# Patient Record
Sex: FEMALE | Race: WHITE | Employment: OTHER | URBAN - METROPOLITAN AREA
[De-identification: names, ages, dates, MRNs, and addresses within clinical notes are randomized per-mention and may not be internally consistent; named-entity substitution may affect disease eponyms.]

---

## 2017-09-19 PROBLEM — G20 PARKINSON DISEASE (HCC): Status: ACTIVE | Noted: 2017-09-19

## 2018-06-18 ENCOUNTER — TELEPHONE (OUTPATIENT)
Dept: SURGERY | Age: 68
End: 2018-06-18

## 2018-06-18 NOTE — TELEPHONE ENCOUNTER
New patient/ Cancelled/ Same day on 06-18-18 for Screening Colon Consult. Patient declined to reschedule at this moment since her  had recently passed.

## 2018-07-23 DIAGNOSIS — G20 PARKINSON DISEASE (HCC): ICD-10-CM

## 2018-09-14 ENCOUNTER — OFFICE VISIT (OUTPATIENT)
Dept: NEUROLOGY | Age: 68
End: 2018-09-14
Payer: COMMERCIAL

## 2018-09-14 VITALS
HEART RATE: 89 BPM | OXYGEN SATURATION: 93 % | SYSTOLIC BLOOD PRESSURE: 125 MMHG | RESPIRATION RATE: 12 BRPM | BODY MASS INDEX: 41.91 KG/M2 | TEMPERATURE: 97.7 F | HEIGHT: 61 IN | DIASTOLIC BLOOD PRESSURE: 77 MMHG | WEIGHT: 222 LBS

## 2018-09-14 DIAGNOSIS — G20 PARKINSON DISEASE (HCC): ICD-10-CM

## 2018-09-14 PROCEDURE — 4040F PNEUMOC VAC/ADMIN/RCVD: CPT | Performed by: NURSE PRACTITIONER

## 2018-09-14 PROCEDURE — 1101F PT FALLS ASSESS-DOCD LE1/YR: CPT | Performed by: NURSE PRACTITIONER

## 2018-09-14 PROCEDURE — 1090F PRES/ABSN URINE INCON ASSESS: CPT | Performed by: NURSE PRACTITIONER

## 2018-09-14 PROCEDURE — G8427 DOCREV CUR MEDS BY ELIG CLIN: HCPCS | Performed by: NURSE PRACTITIONER

## 2018-09-14 PROCEDURE — 3017F COLORECTAL CA SCREEN DOC REV: CPT | Performed by: NURSE PRACTITIONER

## 2018-09-14 PROCEDURE — G8399 PT W/DXA RESULTS DOCUMENT: HCPCS | Performed by: NURSE PRACTITIONER

## 2018-09-14 PROCEDURE — 99213 OFFICE O/P EST LOW 20 MIN: CPT | Performed by: NURSE PRACTITIONER

## 2018-09-14 PROCEDURE — 1036F TOBACCO NON-USER: CPT | Performed by: NURSE PRACTITIONER

## 2018-09-14 PROCEDURE — G8417 CALC BMI ABV UP PARAM F/U: HCPCS | Performed by: NURSE PRACTITIONER

## 2018-09-14 PROCEDURE — 1123F ACP DISCUSS/DSCN MKR DOCD: CPT | Performed by: NURSE PRACTITIONER

## 2018-09-14 RX ORDER — CARBIDOPA AND LEVODOPA 50; 200 MG/1; MG/1
1 TABLET, EXTENDED RELEASE ORAL 3 TIMES DAILY
Qty: 360 TABLET | Refills: 2 | Status: SHIPPED | OUTPATIENT
Start: 2018-09-14 | End: 2018-11-19 | Stop reason: SDUPTHER

## 2018-09-14 RX ORDER — HYDROXYZINE HYDROCHLORIDE 10 MG/1
TABLET, FILM COATED ORAL NIGHTLY
COMMUNITY
End: 2019-03-14 | Stop reason: ALTCHOICE

## 2018-09-14 NOTE — PATIENT INSTRUCTIONS
medical condition or this instruction, always ask your healthcare professional. [place] [address] any warranty or liability for your use of this information.

## 2018-09-14 NOTE — PROGRESS NOTES
[address] [place] [address]. Jerardo Betancur M.D., F.A.C.P. Lucia Sneed, DNP, APRN, ACNS-BC  Jami Zarate. Lachelle Iglesias, MSN, APRN-FNP-C  [address] [facility] [place] [address]  [place], [address]  Phone: [phone]  Fax: [phone]         Bernie Garcia is a 75 y.o. right handed woman. We are following her for Parkinson's disease    She is accompanied by her daughter again today    Her  passed away suddenly in July and [facility] [place] [facility]doing [facility] now. She notes improvement in her Parkinson's symptoms since starting Neupro 4 mg patches. She remains on Sinemet CR 50/200 TID    Decrease in hand tremors, improvement in bradykinesias and no freezing spells. She has been exercising daily--walking up and down her steps    No falls and she feels she is walking better    She does admit that when her other children \"stress her out\" her hand tremors will increase. She continues to follow with psych and was taken off Abilify and placed on Pristiq---taking Vistaril at night for sleep. No dysphagia or cognitive changes    SERENA eval remains pending    Otherwise, no new neuro or medical concerns    No chest pain or palpitations  No SOB  No vertigo, lightheadedness or loss of consciousness  No incontinence of bowels or bladder  No itching or bruising appreciated  No numbness, tingling or focal arm/leg weakness    ROS otherwise negative     Medications:     Prior to Admission medications    Medication Sig Start Date End Date Taking?  Authorizing Provider   hydrOXYzine (ATARAX) 10 MG tablet Take by mouth nightly   Yes Historical Provider, MD   insulin glargine (BASAGLAR KWIKPEN) 100 UNIT/ML injection pen Inject 90 Units into the skin nightly 9/7/18 10/7/18 Yes Harshil Rankin MD   Albuterol Sulfate (VENTOLIN HFA IN) Inhale 2 puffs into the lungs every 6 hours as needed   Yes Historical Provider, MD   lisinopril (PRINIVIL;ZESTRIL) 20 MG tablet Take 1 tablet by mouth daily

## 2018-11-19 ENCOUNTER — TELEPHONE (OUTPATIENT)
Dept: NEUROLOGY | Age: 68
End: 2018-11-19

## 2018-11-19 DIAGNOSIS — G20 PARKINSON DISEASE (HCC): ICD-10-CM

## 2018-11-19 RX ORDER — CARBIDOPA AND LEVODOPA 50; 200 MG/1; MG/1
1 TABLET, EXTENDED RELEASE ORAL 3 TIMES DAILY
Qty: 360 TABLET | Refills: 2 | Status: SHIPPED | OUTPATIENT
Start: 2018-11-19 | End: 2018-11-19 | Stop reason: SDUPTHER

## 2018-11-19 NOTE — TELEPHONE ENCOUNTER
LM for pt to return call so 6 mo f/u with RASHARD Rivera, can be scheduled in March.   Electronically signed by Joaquin Ocampo on 11/19/18 at 9:07 AM

## 2018-11-20 DIAGNOSIS — G20 PARKINSON DISEASE (HCC): ICD-10-CM

## 2018-11-20 RX ORDER — CARBIDOPA AND LEVODOPA 50; 200 MG/1; MG/1
1 TABLET, EXTENDED RELEASE ORAL 3 TIMES DAILY
Qty: 270 TABLET | Refills: 3 | Status: SHIPPED | OUTPATIENT
Start: 2018-11-20 | End: 2019-09-13 | Stop reason: SDUPTHER

## 2018-12-18 ENCOUNTER — HOSPITAL ENCOUNTER (OUTPATIENT)
Dept: AUDIOLOGY | Age: 68
Discharge: HOME | End: 2018-12-18
Payer: COMMERCIAL

## 2018-12-18 PROCEDURE — 92567 TYMPANOMETRY: CPT | Performed by: AUDIOLOGIST

## 2018-12-18 PROCEDURE — 92557 COMPREHENSIVE HEARING TEST: CPT | Performed by: AUDIOLOGIST

## 2018-12-18 NOTE — PROGRESS NOTES
AUDIOMETRIC EVALUATION    REASON FOR REFERRAL:  This patient was referred for audiometric testing by Dominick Daniel MD due to history of hearing loss, right worse than left. She has an [address] hearing aid which is 11years old. She states all she hears is static. She feels her hearing has gotten worse since her last test.        RESULTS:  Pure tone audiometric testing using earphones and insert earphones was carried out. Results revealed air conduction thresholds averaging 25 dBHL through 2000 Hz in the left ear and 60 dBHL in the right ear. Speech reception thresholds were obtained at 20 dBHL and 50 dBHL for the left and right ears . Speech discrimination testing was performed at 55/80 dBHL. Obtained were scores of 92%. (Un)Masked Bone Conduction Testing revealed air bone gaps averaging 15 dBHL through 4000 Hz. Cain lateralized to the left ear at 1000 Hz. Tympanometry was administered and revealed shallow tympanograms bilaterally. 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 Speech testing was in good agreement with the pure tones for the left ear and fair agreement with the pure tones for the right ear. Speech discrimination was excellent bilaterally. Impedance testing revealed essentially normal middle ear function in the right ear (type As) and an essentially flat tympanogram in the left ear (broad shallow peak). These results are similar to those obtained 5 years ago. An ENT consult is suggested if you feel it is clinically warranted due to reduced middle ear compliance bilaterally. She has an appointment with Dr. Ad Wright in March. .     A prior authorization for new hearing aids will be obtained. The above results were reviewed with the patient/parent.       If I can be of further assistance or provide additional information, please do not hesitate to contact this

## 2018-12-28 ENCOUNTER — TELEPHONE (OUTPATIENT)
Dept: NEUROLOGY | Age: 68
End: 2018-12-28

## 2018-12-28 NOTE — TELEPHONE ENCOUNTER
Pt called stating since waking up this morning she has been dizzy like she is going to fall \"face first.\" Pt also c/o feeling lightheaded and that she feels herself shaking on the inside of her body. She has eaten today and take her meds as normal. Pt would like to know if this is related to her Parkinson's. Please advise. Ok to LM if no answer.   Electronically signed by Ara Plaza on 12/28/18 at 10:30 AM

## 2019-01-11 ENCOUNTER — HOSPITAL ENCOUNTER (OUTPATIENT)
Dept: AUDIOLOGY | Age: 69
Discharge: HOME | End: 2019-01-11
Payer: COMMERCIAL

## 2019-01-11 PROCEDURE — 9990000010 HC NO CHARGE VISIT: Performed by: AUDIOLOGIST

## 2019-01-16 ENCOUNTER — TELEPHONE (OUTPATIENT)
Dept: NEUROLOGY | Age: 69
End: 2019-01-16

## 2019-01-21 ENCOUNTER — TELEPHONE (OUTPATIENT)
Dept: NEUROLOGY | Age: 69
End: 2019-01-21

## 2019-01-21 DIAGNOSIS — G20 PARKINSON DISEASE (HCC): Primary | ICD-10-CM

## 2019-02-01 ENCOUNTER — HOSPITAL ENCOUNTER (OUTPATIENT)
Dept: AUDIOLOGY | Age: 69
Discharge: HOME | End: 2019-02-01
Payer: COMMERCIAL

## 2019-02-01 PROCEDURE — 9990000010 HC NO CHARGE VISIT: Performed by: AUDIOLOGIST

## 2019-02-06 ENCOUNTER — TELEPHONE (OUTPATIENT)
Dept: NEUROLOGY | Age: 69
End: 2019-02-06

## 2019-02-13 ENCOUNTER — HOSPITAL ENCOUNTER (OUTPATIENT)
Dept: AUDIOLOGY | Age: 69
Discharge: HOME | End: 2019-02-13

## 2019-02-13 PROCEDURE — 9990000010 HC NO CHARGE VISIT: Performed by: AUDIOLOGIST

## 2019-03-01 ENCOUNTER — HOSPITAL ENCOUNTER (OUTPATIENT)
Dept: AUDIOLOGY | Age: 69
Discharge: HOME | End: 2019-03-01
Payer: MEDICARE

## 2019-03-01 PROCEDURE — V5160 DISPENSING FEE BINAURAL: HCPCS | Performed by: AUDIOLOGIST

## 2019-03-01 PROCEDURE — V5256 HEARING AID, DIGIT, MON, ITE: HCPCS | Performed by: AUDIOLOGIST

## 2019-03-04 ENCOUNTER — OFFICE VISIT (OUTPATIENT)
Dept: ENT CLINIC | Age: 69
End: 2019-03-04
Payer: MEDICARE

## 2019-03-04 VITALS
BODY MASS INDEX: 39.46 KG/M2 | OXYGEN SATURATION: 95 % | DIASTOLIC BLOOD PRESSURE: 80 MMHG | HEART RATE: 94 BPM | HEIGHT: 61 IN | SYSTOLIC BLOOD PRESSURE: 142 MMHG | WEIGHT: 209 LBS

## 2019-03-04 DIAGNOSIS — R49.0 HOARSENESS: ICD-10-CM

## 2019-03-04 DIAGNOSIS — G20 PARKINSON DISEASE (HCC): Primary | ICD-10-CM

## 2019-03-04 PROCEDURE — G8417 CALC BMI ABV UP PARAM F/U: HCPCS | Performed by: OTOLARYNGOLOGY

## 2019-03-04 PROCEDURE — 31575 DIAGNOSTIC LARYNGOSCOPY: CPT | Performed by: OTOLARYNGOLOGY

## 2019-03-04 PROCEDURE — G8427 DOCREV CUR MEDS BY ELIG CLIN: HCPCS | Performed by: OTOLARYNGOLOGY

## 2019-03-04 PROCEDURE — G8482 FLU IMMUNIZE ORDER/ADMIN: HCPCS | Performed by: OTOLARYNGOLOGY

## 2019-03-04 PROCEDURE — 1123F ACP DISCUSS/DSCN MKR DOCD: CPT | Performed by: OTOLARYNGOLOGY

## 2019-03-04 PROCEDURE — 1090F PRES/ABSN URINE INCON ASSESS: CPT | Performed by: OTOLARYNGOLOGY

## 2019-03-04 PROCEDURE — 99204 OFFICE O/P NEW MOD 45 MIN: CPT | Performed by: OTOLARYNGOLOGY

## 2019-03-04 PROCEDURE — 1101F PT FALLS ASSESS-DOCD LE1/YR: CPT | Performed by: OTOLARYNGOLOGY

## 2019-03-04 PROCEDURE — G8399 PT W/DXA RESULTS DOCUMENT: HCPCS | Performed by: OTOLARYNGOLOGY

## 2019-03-04 PROCEDURE — 4040F PNEUMOC VAC/ADMIN/RCVD: CPT | Performed by: OTOLARYNGOLOGY

## 2019-03-04 PROCEDURE — 1036F TOBACCO NON-USER: CPT | Performed by: OTOLARYNGOLOGY

## 2019-03-04 PROCEDURE — 3017F COLORECTAL CA SCREEN DOC REV: CPT | Performed by: OTOLARYNGOLOGY

## 2019-03-04 RX ORDER — AZELASTINE 1 MG/ML
1 SPRAY, METERED NASAL 2 TIMES DAILY
Qty: 1 BOTTLE | Refills: 3 | Status: SHIPPED | OUTPATIENT
Start: 2019-03-04 | End: 2019-03-14 | Stop reason: ALTCHOICE

## 2019-03-04 ASSESSMENT — ENCOUNTER SYMPTOMS
RHINORRHEA: 0
ABDOMINAL PAIN: 0
STRIDOR: 0
EYE PAIN: 0
NAUSEA: 0
VOICE CHANGE: 1
EYE DISCHARGE: 0
SINUS PRESSURE: 0
SHORTNESS OF BREATH: 0

## 2019-03-07 DIAGNOSIS — G20 PARKINSON DISEASE (HCC): ICD-10-CM

## 2019-03-14 ENCOUNTER — APPOINTMENT (OUTPATIENT)
Dept: GENERAL RADIOLOGY | Age: 69
DRG: 193 | End: 2019-03-14
Payer: MEDICARE

## 2019-03-14 ENCOUNTER — TELEPHONE (OUTPATIENT)
Dept: NEUROLOGY | Age: 69
End: 2019-03-14

## 2019-03-14 ENCOUNTER — HOSPITAL ENCOUNTER (INPATIENT)
Age: 69
LOS: 4 days | Discharge: HOME | DRG: 193 | End: 2019-03-18
Attending: EMERGENCY MEDICINE | Admitting: INTERNAL MEDICINE
Payer: MEDICARE

## 2019-03-14 DIAGNOSIS — J18.9 PNEUMONIA DUE TO ORGANISM: Primary | ICD-10-CM

## 2019-03-14 DIAGNOSIS — R09.02 HYPOXIA: ICD-10-CM

## 2019-03-14 DIAGNOSIS — J45.901 REACTIVE AIRWAY DISEASE WITH ACUTE EXACERBATION, UNSPECIFIED ASTHMA SEVERITY, UNSPECIFIED WHETHER PERSISTENT: ICD-10-CM

## 2019-03-14 LAB
ALBUMIN SERPL-MCNC: 3.9 G/DL (ref 3.5–5.2)
ALP BLD-CCNC: 94 U/L (ref 35–104)
ALT SERPL-CCNC: 7 U/L (ref 0–32)
ANION GAP SERPL CALCULATED.3IONS-SCNC: 13 MMOL/L (ref 7–16)
APTT: 29.6 SEC (ref 24.5–35.1)
AST SERPL-CCNC: 28 U/L (ref 0–31)
BASOPHILS ABSOLUTE: 0.05 E9/L (ref 0–0.2)
BASOPHILS RELATIVE PERCENT: 0.8 % (ref 0–2)
BILIRUB SERPL-MCNC: 0.4 MG/DL (ref 0–1.2)
BUN BLDV-MCNC: 11 MG/DL (ref 8–23)
CALCIUM SERPL-MCNC: 8.6 MG/DL (ref 8.6–10.2)
CHLORIDE BLD-SCNC: 100 MMOL/L (ref 98–107)
CO2: 28 MMOL/L (ref 22–29)
CREAT SERPL-MCNC: 0.7 MG/DL (ref 0.5–1)
EKG ATRIAL RATE: 90 BPM
EKG P AXIS: 63 DEGREES
EKG P-R INTERVAL: 160 MS
EKG Q-T INTERVAL: 402 MS
EKG QRS DURATION: 74 MS
EKG QTC CALCULATION (BAZETT): 491 MS
EKG R AXIS: 71 DEGREES
EKG T AXIS: 62 DEGREES
EKG VENTRICULAR RATE: 90 BPM
EOSINOPHILS ABSOLUTE: 0.09 E9/L (ref 0.05–0.5)
EOSINOPHILS RELATIVE PERCENT: 1.4 % (ref 0–6)
FILM ARRAY ADENOVIRUS: ABNORMAL
FILM ARRAY BORDETELLA PERTUSSIS: ABNORMAL
FILM ARRAY CHLAMYDOPHILIA PNEUMONIAE: ABNORMAL
FILM ARRAY CORONAVIRUS 229E: ABNORMAL
FILM ARRAY CORONAVIRUS HKU1: ABNORMAL
FILM ARRAY CORONAVIRUS NL63: ABNORMAL
FILM ARRAY CORONAVIRUS OC43: ABNORMAL
FILM ARRAY INFLUENZA A VIRUS 09H1: ABNORMAL
FILM ARRAY INFLUENZA A VIRUS H1: ABNORMAL
FILM ARRAY INFLUENZA B: ABNORMAL
FILM ARRAY METAPNEUMOVIRUS: ABNORMAL
FILM ARRAY MYCOPLASMA PNEUMONIAE: ABNORMAL
FILM ARRAY PARAINFLUENZA VIRUS 1: ABNORMAL
FILM ARRAY PARAINFLUENZA VIRUS 2: ABNORMAL
FILM ARRAY PARAINFLUENZA VIRUS 3: ABNORMAL
FILM ARRAY PARAINFLUENZA VIRUS 4: ABNORMAL
FILM ARRAY RESPIRATORY SYNCITIAL VIRUS: ABNORMAL
FILM ARRAY RHINOVIRUS/ENTEROVIRUS: ABNORMAL
GFR AFRICAN AMERICAN: >60
GFR NON-AFRICAN AMERICAN: >60 ML/MIN/1.73
GLUCOSE BLD-MCNC: 171 MG/DL (ref 74–99)
HCT VFR BLD CALC: 41.9 % (ref 34–48)
HEMOGLOBIN: 13.1 G/DL (ref 11.5–15.5)
IMMATURE GRANULOCYTES #: 0.07 E9/L
IMMATURE GRANULOCYTES %: 1.1 % (ref 0–5)
INR BLD: 1.2
LACTIC ACID: 1.1 MMOL/L (ref 0.5–2.2)
LYMPHOCYTES ABSOLUTE: 1.62 E9/L (ref 1.5–4)
LYMPHOCYTES RELATIVE PERCENT: 25.5 % (ref 20–42)
MAGNESIUM: 1.7 MG/DL (ref 1.6–2.6)
MCH RBC QN AUTO: 25.7 PG (ref 26–35)
MCHC RBC AUTO-ENTMCNC: 31.3 % (ref 32–34.5)
MCV RBC AUTO: 82.3 FL (ref 80–99.9)
METER GLUCOSE: 251 MG/DL (ref 74–99)
METER GLUCOSE: 331 MG/DL (ref 74–99)
MONOCYTES ABSOLUTE: 0.36 E9/L (ref 0.1–0.95)
MONOCYTES RELATIVE PERCENT: 5.7 % (ref 2–12)
NEUTROPHILS ABSOLUTE: 4.17 E9/L (ref 1.8–7.3)
NEUTROPHILS RELATIVE PERCENT: 65.5 % (ref 43–80)
ORGANISM: ABNORMAL
ORGANISM: ABNORMAL
PDW BLD-RTO: 15.2 FL (ref 11.5–15)
PLATELET # BLD: 260 E9/L (ref 130–450)
PMV BLD AUTO: 10.1 FL (ref 7–12)
POTASSIUM SERPL-SCNC: 4 MMOL/L (ref 3.5–5)
PRO-BNP: 175 PG/ML (ref 0–125)
PROTHROMBIN TIME: 13.3 SEC (ref 9.3–12.4)
RBC # BLD: 5.09 E12/L (ref 3.5–5.5)
SODIUM BLD-SCNC: 141 MMOL/L (ref 132–146)
TOTAL PROTEIN: 7.2 G/DL (ref 6.4–8.3)
TROPONIN: <0.01 NG/ML (ref 0–0.03)
WBC # BLD: 6.4 E9/L (ref 4.5–11.5)

## 2019-03-14 PROCEDURE — 85730 THROMBOPLASTIN TIME PARTIAL: CPT

## 2019-03-14 PROCEDURE — 80053 COMPREHEN METABOLIC PANEL: CPT

## 2019-03-14 PROCEDURE — 6360000002 HC RX W HCPCS: Performed by: INTERNAL MEDICINE

## 2019-03-14 PROCEDURE — 87040 BLOOD CULTURE FOR BACTERIA: CPT

## 2019-03-14 PROCEDURE — 6360000002 HC RX W HCPCS: Performed by: NURSE PRACTITIONER

## 2019-03-14 PROCEDURE — 6360000002 HC RX W HCPCS: Performed by: EMERGENCY MEDICINE

## 2019-03-14 PROCEDURE — 6370000000 HC RX 637 (ALT 250 FOR IP): Performed by: NURSE PRACTITIONER

## 2019-03-14 PROCEDURE — 94664 DEMO&/EVAL PT USE INHALER: CPT

## 2019-03-14 PROCEDURE — 96365 THER/PROPH/DIAG IV INF INIT: CPT

## 2019-03-14 PROCEDURE — 82962 GLUCOSE BLOOD TEST: CPT

## 2019-03-14 PROCEDURE — 6370000000 HC RX 637 (ALT 250 FOR IP): Performed by: INTERNAL MEDICINE

## 2019-03-14 PROCEDURE — 94640 AIRWAY INHALATION TREATMENT: CPT

## 2019-03-14 PROCEDURE — 83880 ASSAY OF NATRIURETIC PEPTIDE: CPT

## 2019-03-14 PROCEDURE — 84484 ASSAY OF TROPONIN QUANT: CPT

## 2019-03-14 PROCEDURE — 2580000003 HC RX 258: Performed by: INTERNAL MEDICINE

## 2019-03-14 PROCEDURE — 87581 M.PNEUMON DNA AMP PROBE: CPT

## 2019-03-14 PROCEDURE — 96375 TX/PRO/DX INJ NEW DRUG ADDON: CPT

## 2019-03-14 PROCEDURE — 87633 RESP VIRUS 12-25 TARGETS: CPT

## 2019-03-14 PROCEDURE — 83605 ASSAY OF LACTIC ACID: CPT

## 2019-03-14 PROCEDURE — 83735 ASSAY OF MAGNESIUM: CPT

## 2019-03-14 PROCEDURE — 2580000003 HC RX 258: Performed by: EMERGENCY MEDICINE

## 2019-03-14 PROCEDURE — 85610 PROTHROMBIN TIME: CPT

## 2019-03-14 PROCEDURE — 87486 CHLMYD PNEUM DNA AMP PROBE: CPT

## 2019-03-14 PROCEDURE — 1200000000 HC SEMI PRIVATE

## 2019-03-14 PROCEDURE — 2500000003 HC RX 250 WO HCPCS: Performed by: EMERGENCY MEDICINE

## 2019-03-14 PROCEDURE — 99285 EMERGENCY DEPT VISIT HI MDM: CPT

## 2019-03-14 PROCEDURE — 94760 N-INVAS EAR/PLS OXIMETRY 1: CPT

## 2019-03-14 PROCEDURE — 94761 N-INVAS EAR/PLS OXIMETRY MLT: CPT

## 2019-03-14 PROCEDURE — 87798 DETECT AGENT NOS DNA AMP: CPT

## 2019-03-14 PROCEDURE — 71046 X-RAY EXAM CHEST 2 VIEWS: CPT

## 2019-03-14 PROCEDURE — 93005 ELECTROCARDIOGRAM TRACING: CPT | Performed by: NURSE PRACTITIONER

## 2019-03-14 PROCEDURE — 2580000003 HC RX 258: Performed by: NURSE PRACTITIONER

## 2019-03-14 PROCEDURE — 85025 COMPLETE CBC W/AUTO DIFF WBC: CPT

## 2019-03-14 RX ORDER — ALBUTEROL SULFATE 90 UG/1
2 AEROSOL, METERED RESPIRATORY (INHALATION) EVERY 6 HOURS PRN
COMMUNITY
End: 2020-01-10

## 2019-03-14 RX ORDER — ASPIRIN 81 MG/1
81 TABLET, CHEWABLE ORAL NIGHTLY
Status: DISCONTINUED | OUTPATIENT
Start: 2019-03-14 | End: 2019-03-18 | Stop reason: HOSPADM

## 2019-03-14 RX ORDER — CARBIDOPA AND LEVODOPA 50; 200 MG/1; MG/1
1 TABLET, EXTENDED RELEASE ORAL 3 TIMES DAILY
Status: DISCONTINUED | OUTPATIENT
Start: 2019-03-14 | End: 2019-03-14 | Stop reason: CLARIF

## 2019-03-14 RX ORDER — ALBUTEROL SULFATE 2.5 MG/3ML
2.5 SOLUTION RESPIRATORY (INHALATION) EVERY 4 HOURS PRN
Status: DISCONTINUED | OUTPATIENT
Start: 2019-03-14 | End: 2019-03-18 | Stop reason: HOSPADM

## 2019-03-14 RX ORDER — PANTOPRAZOLE SODIUM 40 MG/1
40 TABLET, DELAYED RELEASE ORAL
Status: DISCONTINUED | OUTPATIENT
Start: 2019-03-15 | End: 2019-03-18 | Stop reason: HOSPADM

## 2019-03-14 RX ORDER — SODIUM CHLORIDE 0.9 % (FLUSH) 0.9 %
10 SYRINGE (ML) INJECTION PRN
Status: DISCONTINUED | OUTPATIENT
Start: 2019-03-14 | End: 2019-03-18 | Stop reason: HOSPADM

## 2019-03-14 RX ORDER — DEXTROSE MONOHYDRATE 50 MG/ML
100 INJECTION, SOLUTION INTRAVENOUS PRN
Status: DISCONTINUED | OUTPATIENT
Start: 2019-03-14 | End: 2019-03-18 | Stop reason: HOSPADM

## 2019-03-14 RX ORDER — 0.9 % SODIUM CHLORIDE 0.9 %
1000 INTRAVENOUS SOLUTION INTRAVENOUS ONCE
Status: COMPLETED | OUTPATIENT
Start: 2019-03-14 | End: 2019-03-14

## 2019-03-14 RX ORDER — MONTELUKAST SODIUM 10 MG/1
10 TABLET ORAL NIGHTLY
Status: DISCONTINUED | OUTPATIENT
Start: 2019-03-14 | End: 2019-03-18 | Stop reason: HOSPADM

## 2019-03-14 RX ORDER — METHYLPREDNISOLONE SODIUM SUCCINATE 125 MG/2ML
125 INJECTION, POWDER, LYOPHILIZED, FOR SOLUTION INTRAMUSCULAR; INTRAVENOUS ONCE
Status: COMPLETED | OUTPATIENT
Start: 2019-03-14 | End: 2019-03-14

## 2019-03-14 RX ORDER — ACETAMINOPHEN 325 MG/1
650 TABLET ORAL EVERY 4 HOURS PRN
Status: DISCONTINUED | OUTPATIENT
Start: 2019-03-14 | End: 2019-03-18 | Stop reason: HOSPADM

## 2019-03-14 RX ORDER — DOXYCYCLINE HYCLATE 100 MG/1
100 CAPSULE ORAL EVERY 12 HOURS SCHEDULED
Status: DISCONTINUED | OUTPATIENT
Start: 2019-03-14 | End: 2019-03-18 | Stop reason: HOSPADM

## 2019-03-14 RX ORDER — LAMOTRIGINE 25 MG/1
50 TABLET ORAL EVERY MORNING
Status: DISCONTINUED | OUTPATIENT
Start: 2019-03-15 | End: 2019-03-18 | Stop reason: HOSPADM

## 2019-03-14 RX ORDER — ATORVASTATIN CALCIUM 20 MG/1
20 TABLET, FILM COATED ORAL DAILY
Status: DISCONTINUED | OUTPATIENT
Start: 2019-03-14 | End: 2019-03-18 | Stop reason: HOSPADM

## 2019-03-14 RX ORDER — NICOTINE POLACRILEX 4 MG
15 LOZENGE BUCCAL PRN
Status: DISCONTINUED | OUTPATIENT
Start: 2019-03-14 | End: 2019-03-18 | Stop reason: HOSPADM

## 2019-03-14 RX ORDER — DEXTROSE MONOHYDRATE 25 G/50ML
12.5 INJECTION, SOLUTION INTRAVENOUS PRN
Status: DISCONTINUED | OUTPATIENT
Start: 2019-03-14 | End: 2019-03-18 | Stop reason: HOSPADM

## 2019-03-14 RX ORDER — IPRATROPIUM BROMIDE AND ALBUTEROL SULFATE 2.5; .5 MG/3ML; MG/3ML
1 SOLUTION RESPIRATORY (INHALATION)
Status: COMPLETED | OUTPATIENT
Start: 2019-03-14 | End: 2019-03-14

## 2019-03-14 RX ORDER — LISINOPRIL 20 MG/1
20 TABLET ORAL DAILY
Status: DISCONTINUED | OUTPATIENT
Start: 2019-03-14 | End: 2019-03-18 | Stop reason: HOSPADM

## 2019-03-14 RX ORDER — OSELTAMIVIR PHOSPHATE 75 MG/1
75 CAPSULE ORAL 2 TIMES DAILY
Status: DISCONTINUED | OUTPATIENT
Start: 2019-03-14 | End: 2019-03-18 | Stop reason: HOSPADM

## 2019-03-14 RX ORDER — SODIUM CHLORIDE 0.9 % (FLUSH) 0.9 %
10 SYRINGE (ML) INJECTION EVERY 12 HOURS SCHEDULED
Status: DISCONTINUED | OUTPATIENT
Start: 2019-03-14 | End: 2019-03-18 | Stop reason: HOSPADM

## 2019-03-14 RX ORDER — ALBUTEROL SULFATE 2.5 MG/3ML
2.5 SOLUTION RESPIRATORY (INHALATION) ONCE
Status: COMPLETED | OUTPATIENT
Start: 2019-03-14 | End: 2019-03-14

## 2019-03-14 RX ORDER — IPRATROPIUM BROMIDE AND ALBUTEROL SULFATE 2.5; .5 MG/3ML; MG/3ML
1 SOLUTION RESPIRATORY (INHALATION) ONCE
Status: COMPLETED | OUTPATIENT
Start: 2019-03-14 | End: 2019-03-14

## 2019-03-14 RX ORDER — INSULIN GLARGINE 100 [IU]/ML
90 INJECTION, SOLUTION SUBCUTANEOUS NIGHTLY
Status: DISCONTINUED | OUTPATIENT
Start: 2019-03-14 | End: 2019-03-18 | Stop reason: HOSPADM

## 2019-03-14 RX ORDER — CARBIDOPA AND LEVODOPA 25; 100 MG/1; MG/1
2 TABLET, EXTENDED RELEASE ORAL 3 TIMES DAILY
Status: DISCONTINUED | OUTPATIENT
Start: 2019-03-14 | End: 2019-03-18 | Stop reason: HOSPADM

## 2019-03-14 RX ORDER — DESVENLAFAXINE 50 MG/1
150 TABLET, EXTENDED RELEASE ORAL EVERY MORNING
Status: DISCONTINUED | OUTPATIENT
Start: 2019-03-15 | End: 2019-03-18 | Stop reason: HOSPADM

## 2019-03-14 RX ORDER — LAMOTRIGINE 25 MG/1
50 TABLET ORAL EVERY MORNING
COMMUNITY
End: 2019-09-04 | Stop reason: DRUGHIGH

## 2019-03-14 RX ORDER — AMLODIPINE BESYLATE 2.5 MG/1
2.5 TABLET ORAL DAILY
Status: DISCONTINUED | OUTPATIENT
Start: 2019-03-14 | End: 2019-03-18 | Stop reason: HOSPADM

## 2019-03-14 RX ADMIN — ATORVASTATIN CALCIUM 20 MG: 20 TABLET, FILM COATED ORAL at 14:59

## 2019-03-14 RX ADMIN — INSULIN LISPRO 6 UNITS: 100 INJECTION, SOLUTION INTRAVENOUS; SUBCUTANEOUS at 17:03

## 2019-03-14 RX ADMIN — ALBUTEROL SULFATE 2.5 MG: 2.5 SOLUTION RESPIRATORY (INHALATION) at 22:28

## 2019-03-14 RX ADMIN — AMLODIPINE BESYLATE 2.5 MG: 2.5 TABLET ORAL at 15:00

## 2019-03-14 RX ADMIN — CARBIDOPA AND LEVODOPA 2 TABLET: 25; 100 TABLET, EXTENDED RELEASE ORAL at 15:00

## 2019-03-14 RX ADMIN — IPRATROPIUM BROMIDE AND ALBUTEROL SULFATE 1 AMPULE: .5; 3 SOLUTION RESPIRATORY (INHALATION) at 10:45

## 2019-03-14 RX ADMIN — SODIUM CHLORIDE 1000 ML: 9 INJECTION, SOLUTION INTRAVENOUS at 10:40

## 2019-03-14 RX ADMIN — Medication 10 ML: at 15:00

## 2019-03-14 RX ADMIN — Medication 10 ML: at 22:05

## 2019-03-14 RX ADMIN — OSELTAMIVIR PHOSPHATE 75 MG: 75 CAPSULE ORAL at 22:02

## 2019-03-14 RX ADMIN — INSULIN GLARGINE 90 UNITS: 100 INJECTION, SOLUTION SUBCUTANEOUS at 21:59

## 2019-03-14 RX ADMIN — IPRATROPIUM BROMIDE AND ALBUTEROL SULFATE 1 AMPULE: .5; 3 SOLUTION RESPIRATORY (INHALATION) at 12:28

## 2019-03-14 RX ADMIN — ASPIRIN 81 MG 81 MG: 81 TABLET ORAL at 22:02

## 2019-03-14 RX ADMIN — DOXYCYCLINE 100 MG: 100 INJECTION, POWDER, LYOPHILIZED, FOR SOLUTION INTRAVENOUS at 13:27

## 2019-03-14 RX ADMIN — DOXYCYCLINE HYCLATE 100 MG: 100 CAPSULE ORAL at 22:02

## 2019-03-14 RX ADMIN — LISINOPRIL 20 MG: 20 TABLET ORAL at 14:59

## 2019-03-14 RX ADMIN — CEFTRIAXONE SODIUM 2 G: 2 INJECTION, POWDER, FOR SOLUTION INTRAMUSCULAR; INTRAVENOUS at 12:37

## 2019-03-14 RX ADMIN — CARBIDOPA AND LEVODOPA 2 TABLET: 25; 100 TABLET, EXTENDED RELEASE ORAL at 22:02

## 2019-03-14 RX ADMIN — METHYLPREDNISOLONE SODIUM SUCCINATE 125 MG: 125 INJECTION, POWDER, FOR SOLUTION INTRAMUSCULAR; INTRAVENOUS at 10:51

## 2019-03-14 RX ADMIN — IPRATROPIUM BROMIDE AND ALBUTEROL SULFATE 1 AMPULE: .5; 3 SOLUTION RESPIRATORY (INHALATION) at 10:50

## 2019-03-14 RX ADMIN — INSULIN LISPRO 4 UNITS: 100 INJECTION, SOLUTION INTRAVENOUS; SUBCUTANEOUS at 22:00

## 2019-03-14 RX ADMIN — MONTELUKAST SODIUM 10 MG: 10 TABLET, FILM COATED ORAL at 22:02

## 2019-03-15 LAB
ANION GAP SERPL CALCULATED.3IONS-SCNC: 13 MMOL/L (ref 7–16)
ATYPICAL LYMPHOCYTE RELATIVE PERCENT: 0.9 % (ref 0–4)
BASOPHILS ABSOLUTE: 0 E9/L (ref 0–0.2)
BASOPHILS RELATIVE PERCENT: 0 % (ref 0–2)
BUN BLDV-MCNC: 15 MG/DL (ref 8–23)
CALCIUM SERPL-MCNC: 8.6 MG/DL (ref 8.6–10.2)
CHLORIDE BLD-SCNC: 100 MMOL/L (ref 98–107)
CO2: 28 MMOL/L (ref 22–29)
CREAT SERPL-MCNC: 0.7 MG/DL (ref 0.5–1)
EOSINOPHILS ABSOLUTE: 0 E9/L (ref 0.05–0.5)
EOSINOPHILS RELATIVE PERCENT: 0 % (ref 0–6)
GFR AFRICAN AMERICAN: >60
GFR NON-AFRICAN AMERICAN: >60 ML/MIN/1.73
GLUCOSE BLD-MCNC: 189 MG/DL (ref 74–99)
HCT VFR BLD CALC: 39 % (ref 34–48)
HEMOGLOBIN: 12 G/DL (ref 11.5–15.5)
LYMPHOCYTES ABSOLUTE: 1.31 E9/L (ref 1.5–4)
LYMPHOCYTES RELATIVE PERCENT: 15 % (ref 20–42)
MCH RBC QN AUTO: 25.7 PG (ref 26–35)
MCHC RBC AUTO-ENTMCNC: 30.8 % (ref 32–34.5)
MCV RBC AUTO: 83.5 FL (ref 80–99.9)
METER GLUCOSE: 138 MG/DL (ref 74–99)
METER GLUCOSE: 160 MG/DL (ref 74–99)
METER GLUCOSE: 181 MG/DL (ref 74–99)
METER GLUCOSE: 190 MG/DL (ref 74–99)
MONOCYTES ABSOLUTE: 0.16 E9/L (ref 0.1–0.95)
MONOCYTES RELATIVE PERCENT: 1.8 % (ref 2–12)
MYELOCYTE PERCENT: 0.9 % (ref 0–0)
NEUTROPHILS ABSOLUTE: 6.72 E9/L (ref 1.8–7.3)
NEUTROPHILS RELATIVE PERCENT: 81.4 % (ref 43–80)
NUCLEATED RED BLOOD CELLS: 0 /100 WBC
OVALOCYTES: ABNORMAL
PDW BLD-RTO: 15.1 FL (ref 11.5–15)
PLATELET # BLD: 258 E9/L (ref 130–450)
PMV BLD AUTO: 9.5 FL (ref 7–12)
POIKILOCYTES: ABNORMAL
POTASSIUM SERPL-SCNC: 3.7 MMOL/L (ref 3.5–5)
RBC # BLD: 4.67 E12/L (ref 3.5–5.5)
SODIUM BLD-SCNC: 141 MMOL/L (ref 132–146)
TEAR DROP CELLS: ABNORMAL
WBC # BLD: 8.2 E9/L (ref 4.5–11.5)

## 2019-03-15 PROCEDURE — 94664 DEMO&/EVAL PT USE INHALER: CPT

## 2019-03-15 PROCEDURE — 6360000002 HC RX W HCPCS: Performed by: INTERNAL MEDICINE

## 2019-03-15 PROCEDURE — 94640 AIRWAY INHALATION TREATMENT: CPT

## 2019-03-15 PROCEDURE — 2580000003 HC RX 258: Performed by: INTERNAL MEDICINE

## 2019-03-15 PROCEDURE — 36415 COLL VENOUS BLD VENIPUNCTURE: CPT

## 2019-03-15 PROCEDURE — 6370000000 HC RX 637 (ALT 250 FOR IP): Performed by: INTERNAL MEDICINE

## 2019-03-15 PROCEDURE — 2700000000 HC OXYGEN THERAPY PER DAY

## 2019-03-15 PROCEDURE — 80048 BASIC METABOLIC PNL TOTAL CA: CPT

## 2019-03-15 PROCEDURE — 82962 GLUCOSE BLOOD TEST: CPT

## 2019-03-15 PROCEDURE — 85025 COMPLETE CBC W/AUTO DIFF WBC: CPT

## 2019-03-15 PROCEDURE — 1200000000 HC SEMI PRIVATE

## 2019-03-15 RX ORDER — ZOLPIDEM TARTRATE 5 MG/1
5 TABLET ORAL NIGHTLY PRN
Status: DISCONTINUED | OUTPATIENT
Start: 2019-03-15 | End: 2019-03-18 | Stop reason: HOSPADM

## 2019-03-15 RX ADMIN — CEFTRIAXONE 1 G: 1 INJECTION, POWDER, FOR SOLUTION INTRAMUSCULAR; INTRAVENOUS at 11:37

## 2019-03-15 RX ADMIN — PANTOPRAZOLE SODIUM 40 MG: 40 TABLET, DELAYED RELEASE ORAL at 06:17

## 2019-03-15 RX ADMIN — DESVENLAFAXINE SUCCINATE 150 MG: 50 TABLET, EXTENDED RELEASE ORAL at 08:05

## 2019-03-15 RX ADMIN — LAMOTRIGINE 50 MG: 25 TABLET ORAL at 08:05

## 2019-03-15 RX ADMIN — MOMETASONE FUROATE AND FORMOTEROL FUMARATE DIHYDRATE 2 PUFF: 200; 5 AEROSOL RESPIRATORY (INHALATION) at 09:52

## 2019-03-15 RX ADMIN — DOXYCYCLINE HYCLATE 100 MG: 100 CAPSULE ORAL at 08:05

## 2019-03-15 RX ADMIN — MOMETASONE FUROATE AND FORMOTEROL FUMARATE DIHYDRATE 2 PUFF: 200; 5 AEROSOL RESPIRATORY (INHALATION) at 19:44

## 2019-03-15 RX ADMIN — MONTELUKAST SODIUM 10 MG: 10 TABLET, FILM COATED ORAL at 21:30

## 2019-03-15 RX ADMIN — LISINOPRIL 20 MG: 20 TABLET ORAL at 08:06

## 2019-03-15 RX ADMIN — INSULIN GLARGINE 90 UNITS: 100 INJECTION, SOLUTION SUBCUTANEOUS at 21:44

## 2019-03-15 RX ADMIN — Medication 10 ML: at 21:44

## 2019-03-15 RX ADMIN — OSELTAMIVIR PHOSPHATE 75 MG: 75 CAPSULE ORAL at 08:05

## 2019-03-15 RX ADMIN — INSULIN LISPRO 2 UNITS: 100 INJECTION, SOLUTION INTRAVENOUS; SUBCUTANEOUS at 08:12

## 2019-03-15 RX ADMIN — ATORVASTATIN CALCIUM 20 MG: 20 TABLET, FILM COATED ORAL at 08:05

## 2019-03-15 RX ADMIN — DOXYCYCLINE HYCLATE 100 MG: 100 CAPSULE ORAL at 21:30

## 2019-03-15 RX ADMIN — Medication 10 ML: at 08:06

## 2019-03-15 RX ADMIN — OSELTAMIVIR PHOSPHATE 75 MG: 75 CAPSULE ORAL at 21:30

## 2019-03-15 RX ADMIN — CARBIDOPA AND LEVODOPA 2 TABLET: 25; 100 TABLET, EXTENDED RELEASE ORAL at 21:30

## 2019-03-15 RX ADMIN — AMLODIPINE BESYLATE 2.5 MG: 2.5 TABLET ORAL at 08:05

## 2019-03-15 RX ADMIN — CARBIDOPA AND LEVODOPA 2 TABLET: 25; 100 TABLET, EXTENDED RELEASE ORAL at 14:20

## 2019-03-15 RX ADMIN — ASPIRIN 81 MG 81 MG: 81 TABLET ORAL at 21:30

## 2019-03-15 RX ADMIN — CARBIDOPA AND LEVODOPA 2 TABLET: 25; 100 TABLET, EXTENDED RELEASE ORAL at 08:10

## 2019-03-15 RX ADMIN — INSULIN LISPRO 2 UNITS: 100 INJECTION, SOLUTION INTRAVENOUS; SUBCUTANEOUS at 16:17

## 2019-03-15 RX ADMIN — INSULIN LISPRO 1 UNITS: 100 INJECTION, SOLUTION INTRAVENOUS; SUBCUTANEOUS at 21:44

## 2019-03-15 ASSESSMENT — PAIN SCALES - GENERAL: PAINLEVEL_OUTOF10: 0

## 2019-03-15 ASSESSMENT — PAIN - FUNCTIONAL ASSESSMENT: PAIN_FUNCTIONAL_ASSESSMENT: 0-10

## 2019-03-16 LAB
ANION GAP SERPL CALCULATED.3IONS-SCNC: 8 MMOL/L (ref 7–16)
BUN BLDV-MCNC: 19 MG/DL (ref 8–23)
CALCIUM SERPL-MCNC: 8.6 MG/DL (ref 8.6–10.2)
CHLORIDE BLD-SCNC: 103 MMOL/L (ref 98–107)
CO2: 33 MMOL/L (ref 22–29)
CREAT SERPL-MCNC: 0.7 MG/DL (ref 0.5–1)
GFR AFRICAN AMERICAN: >60
GFR NON-AFRICAN AMERICAN: >60 ML/MIN/1.73
GLUCOSE BLD-MCNC: 77 MG/DL (ref 74–99)
METER GLUCOSE: 103 MG/DL (ref 74–99)
METER GLUCOSE: 148 MG/DL (ref 74–99)
METER GLUCOSE: 155 MG/DL (ref 74–99)
METER GLUCOSE: 73 MG/DL (ref 74–99)
POTASSIUM SERPL-SCNC: 3.5 MMOL/L (ref 3.5–5)
SODIUM BLD-SCNC: 144 MMOL/L (ref 132–146)

## 2019-03-16 PROCEDURE — 6360000002 HC RX W HCPCS: Performed by: INTERNAL MEDICINE

## 2019-03-16 PROCEDURE — 94640 AIRWAY INHALATION TREATMENT: CPT

## 2019-03-16 PROCEDURE — 2700000000 HC OXYGEN THERAPY PER DAY

## 2019-03-16 PROCEDURE — 80048 BASIC METABOLIC PNL TOTAL CA: CPT

## 2019-03-16 PROCEDURE — 36415 COLL VENOUS BLD VENIPUNCTURE: CPT

## 2019-03-16 PROCEDURE — 1200000000 HC SEMI PRIVATE

## 2019-03-16 PROCEDURE — 82962 GLUCOSE BLOOD TEST: CPT

## 2019-03-16 PROCEDURE — 6370000000 HC RX 637 (ALT 250 FOR IP): Performed by: INTERNAL MEDICINE

## 2019-03-16 PROCEDURE — 2580000003 HC RX 258: Performed by: INTERNAL MEDICINE

## 2019-03-16 RX ADMIN — INSULIN LISPRO 2 UNITS: 100 INJECTION, SOLUTION INTRAVENOUS; SUBCUTANEOUS at 16:32

## 2019-03-16 RX ADMIN — LAMOTRIGINE 50 MG: 25 TABLET ORAL at 09:12

## 2019-03-16 RX ADMIN — MONTELUKAST SODIUM 10 MG: 10 TABLET, FILM COATED ORAL at 20:56

## 2019-03-16 RX ADMIN — CARBIDOPA AND LEVODOPA 2 TABLET: 25; 100 TABLET, EXTENDED RELEASE ORAL at 20:56

## 2019-03-16 RX ADMIN — PANTOPRAZOLE SODIUM 40 MG: 40 TABLET, DELAYED RELEASE ORAL at 06:14

## 2019-03-16 RX ADMIN — CARBIDOPA AND LEVODOPA 2 TABLET: 25; 100 TABLET, EXTENDED RELEASE ORAL at 09:12

## 2019-03-16 RX ADMIN — CARBIDOPA AND LEVODOPA 2 TABLET: 25; 100 TABLET, EXTENDED RELEASE ORAL at 15:00

## 2019-03-16 RX ADMIN — OSELTAMIVIR PHOSPHATE 75 MG: 75 CAPSULE ORAL at 09:12

## 2019-03-16 RX ADMIN — OSELTAMIVIR PHOSPHATE 75 MG: 75 CAPSULE ORAL at 20:56

## 2019-03-16 RX ADMIN — ZOLPIDEM TARTRATE 5 MG: 5 TABLET ORAL at 00:20

## 2019-03-16 RX ADMIN — MOMETASONE FUROATE AND FORMOTEROL FUMARATE DIHYDRATE 2 PUFF: 200; 5 AEROSOL RESPIRATORY (INHALATION) at 21:02

## 2019-03-16 RX ADMIN — DOXYCYCLINE HYCLATE 100 MG: 100 CAPSULE ORAL at 20:56

## 2019-03-16 RX ADMIN — ZOLPIDEM TARTRATE 5 MG: 5 TABLET ORAL at 20:57

## 2019-03-16 RX ADMIN — AMLODIPINE BESYLATE 2.5 MG: 2.5 TABLET ORAL at 09:12

## 2019-03-16 RX ADMIN — Medication 10 ML: at 09:14

## 2019-03-16 RX ADMIN — ATORVASTATIN CALCIUM 20 MG: 20 TABLET, FILM COATED ORAL at 09:12

## 2019-03-16 RX ADMIN — CEFTRIAXONE 1 G: 1 INJECTION, POWDER, FOR SOLUTION INTRAMUSCULAR; INTRAVENOUS at 11:47

## 2019-03-16 RX ADMIN — DESVENLAFAXINE SUCCINATE 150 MG: 50 TABLET, EXTENDED RELEASE ORAL at 09:12

## 2019-03-16 RX ADMIN — MOMETASONE FUROATE AND FORMOTEROL FUMARATE DIHYDRATE 2 PUFF: 200; 5 AEROSOL RESPIRATORY (INHALATION) at 09:57

## 2019-03-16 RX ADMIN — INSULIN LISPRO 1 UNITS: 100 INJECTION, SOLUTION INTRAVENOUS; SUBCUTANEOUS at 20:57

## 2019-03-16 RX ADMIN — DOXYCYCLINE HYCLATE 100 MG: 100 CAPSULE ORAL at 09:12

## 2019-03-16 RX ADMIN — ASPIRIN 81 MG 81 MG: 81 TABLET ORAL at 20:56

## 2019-03-16 RX ADMIN — INSULIN GLARGINE 90 UNITS: 100 INJECTION, SOLUTION SUBCUTANEOUS at 20:57

## 2019-03-16 RX ADMIN — LISINOPRIL 20 MG: 20 TABLET ORAL at 09:12

## 2019-03-16 RX ADMIN — Medication 10 ML: at 20:56

## 2019-03-16 ASSESSMENT — PAIN SCALES - GENERAL
PAINLEVEL_OUTOF10: 0
PAINLEVEL_OUTOF10: 0

## 2019-03-17 LAB
ANION GAP SERPL CALCULATED.3IONS-SCNC: 8 MMOL/L (ref 7–16)
BUN BLDV-MCNC: 20 MG/DL (ref 8–23)
CALCIUM SERPL-MCNC: 8.6 MG/DL (ref 8.6–10.2)
CHLORIDE BLD-SCNC: 104 MMOL/L (ref 98–107)
CO2: 31 MMOL/L (ref 22–29)
CREAT SERPL-MCNC: 0.7 MG/DL (ref 0.5–1)
GFR AFRICAN AMERICAN: >60
GFR NON-AFRICAN AMERICAN: >60 ML/MIN/1.73
GLUCOSE BLD-MCNC: 84 MG/DL (ref 74–99)
METER GLUCOSE: 121 MG/DL (ref 74–99)
METER GLUCOSE: 132 MG/DL (ref 74–99)
METER GLUCOSE: 161 MG/DL (ref 74–99)
METER GLUCOSE: 77 MG/DL (ref 74–99)
POTASSIUM SERPL-SCNC: 4.2 MMOL/L (ref 3.5–5)
SODIUM BLD-SCNC: 143 MMOL/L (ref 132–146)

## 2019-03-17 PROCEDURE — 6360000002 HC RX W HCPCS: Performed by: INTERNAL MEDICINE

## 2019-03-17 PROCEDURE — 82962 GLUCOSE BLOOD TEST: CPT

## 2019-03-17 PROCEDURE — 36415 COLL VENOUS BLD VENIPUNCTURE: CPT

## 2019-03-17 PROCEDURE — 2580000003 HC RX 258: Performed by: INTERNAL MEDICINE

## 2019-03-17 PROCEDURE — 1200000000 HC SEMI PRIVATE

## 2019-03-17 PROCEDURE — 80048 BASIC METABOLIC PNL TOTAL CA: CPT

## 2019-03-17 PROCEDURE — 6370000000 HC RX 637 (ALT 250 FOR IP): Performed by: INTERNAL MEDICINE

## 2019-03-17 PROCEDURE — 2700000000 HC OXYGEN THERAPY PER DAY

## 2019-03-17 PROCEDURE — 94640 AIRWAY INHALATION TREATMENT: CPT

## 2019-03-17 RX ADMIN — Medication 10 ML: at 10:11

## 2019-03-17 RX ADMIN — DESVENLAFAXINE SUCCINATE 150 MG: 50 TABLET, EXTENDED RELEASE ORAL at 10:07

## 2019-03-17 RX ADMIN — MOMETASONE FUROATE AND FORMOTEROL FUMARATE DIHYDRATE 2 PUFF: 200; 5 AEROSOL RESPIRATORY (INHALATION) at 19:34

## 2019-03-17 RX ADMIN — OSELTAMIVIR PHOSPHATE 75 MG: 75 CAPSULE ORAL at 21:57

## 2019-03-17 RX ADMIN — MONTELUKAST SODIUM 10 MG: 10 TABLET, FILM COATED ORAL at 21:57

## 2019-03-17 RX ADMIN — OSELTAMIVIR PHOSPHATE 75 MG: 75 CAPSULE ORAL at 10:07

## 2019-03-17 RX ADMIN — MOMETASONE FUROATE AND FORMOTEROL FUMARATE DIHYDRATE 2 PUFF: 200; 5 AEROSOL RESPIRATORY (INHALATION) at 09:11

## 2019-03-17 RX ADMIN — CARBIDOPA AND LEVODOPA 2 TABLET: 25; 100 TABLET, EXTENDED RELEASE ORAL at 10:07

## 2019-03-17 RX ADMIN — ASPIRIN 81 MG 81 MG: 81 TABLET ORAL at 22:58

## 2019-03-17 RX ADMIN — LISINOPRIL 20 MG: 20 TABLET ORAL at 10:07

## 2019-03-17 RX ADMIN — AMLODIPINE BESYLATE 2.5 MG: 2.5 TABLET ORAL at 10:11

## 2019-03-17 RX ADMIN — LAMOTRIGINE 50 MG: 25 TABLET ORAL at 10:06

## 2019-03-17 RX ADMIN — DOXYCYCLINE HYCLATE 100 MG: 100 CAPSULE ORAL at 21:57

## 2019-03-17 RX ADMIN — INSULIN LISPRO 1 UNITS: 100 INJECTION, SOLUTION INTRAVENOUS; SUBCUTANEOUS at 22:01

## 2019-03-17 RX ADMIN — Medication 10 ML: at 22:59

## 2019-03-17 RX ADMIN — CARBIDOPA AND LEVODOPA 2 TABLET: 25; 100 TABLET, EXTENDED RELEASE ORAL at 16:32

## 2019-03-17 RX ADMIN — Medication 10 ML: at 14:05

## 2019-03-17 RX ADMIN — DOXYCYCLINE HYCLATE 100 MG: 100 CAPSULE ORAL at 10:07

## 2019-03-17 RX ADMIN — ATORVASTATIN CALCIUM 20 MG: 20 TABLET, FILM COATED ORAL at 10:07

## 2019-03-17 RX ADMIN — CARBIDOPA AND LEVODOPA 2 TABLET: 25; 100 TABLET, EXTENDED RELEASE ORAL at 22:58

## 2019-03-17 RX ADMIN — PANTOPRAZOLE SODIUM 40 MG: 40 TABLET, DELAYED RELEASE ORAL at 06:11

## 2019-03-17 RX ADMIN — ZOLPIDEM TARTRATE 5 MG: 5 TABLET ORAL at 22:58

## 2019-03-17 ASSESSMENT — PAIN SCALES - GENERAL: PAINLEVEL_OUTOF10: 0

## 2019-03-18 VITALS
DIASTOLIC BLOOD PRESSURE: 77 MMHG | BODY MASS INDEX: 39.67 KG/M2 | SYSTOLIC BLOOD PRESSURE: 169 MMHG | HEIGHT: 61 IN | RESPIRATION RATE: 18 BRPM | TEMPERATURE: 98.7 F | WEIGHT: 210.1 LBS | HEART RATE: 84 BPM | OXYGEN SATURATION: 89 %

## 2019-03-18 PROBLEM — J18.9 PNEUMONIA: Status: RESOLVED | Noted: 2019-03-14 | Resolved: 2019-03-18

## 2019-03-18 LAB
ANION GAP SERPL CALCULATED.3IONS-SCNC: 11 MMOL/L (ref 7–16)
BUN BLDV-MCNC: 22 MG/DL (ref 8–23)
CALCIUM SERPL-MCNC: 8.9 MG/DL (ref 8.6–10.2)
CHLORIDE BLD-SCNC: 100 MMOL/L (ref 98–107)
CO2: 31 MMOL/L (ref 22–29)
CREAT SERPL-MCNC: 0.7 MG/DL (ref 0.5–1)
GFR AFRICAN AMERICAN: >60
GFR NON-AFRICAN AMERICAN: >60 ML/MIN/1.73
GLUCOSE BLD-MCNC: 98 MG/DL (ref 74–99)
METER GLUCOSE: 101 MG/DL (ref 74–99)
METER GLUCOSE: 148 MG/DL (ref 74–99)
POTASSIUM SERPL-SCNC: 3.8 MMOL/L (ref 3.5–5)
SODIUM BLD-SCNC: 142 MMOL/L (ref 132–146)

## 2019-03-18 PROCEDURE — 2700000000 HC OXYGEN THERAPY PER DAY

## 2019-03-18 PROCEDURE — 80048 BASIC METABOLIC PNL TOTAL CA: CPT

## 2019-03-18 PROCEDURE — 2580000003 HC RX 258: Performed by: INTERNAL MEDICINE

## 2019-03-18 PROCEDURE — 6370000000 HC RX 637 (ALT 250 FOR IP): Performed by: INTERNAL MEDICINE

## 2019-03-18 PROCEDURE — 36415 COLL VENOUS BLD VENIPUNCTURE: CPT

## 2019-03-18 PROCEDURE — 94640 AIRWAY INHALATION TREATMENT: CPT

## 2019-03-18 PROCEDURE — 82962 GLUCOSE BLOOD TEST: CPT

## 2019-03-18 RX ORDER — OSELTAMIVIR PHOSPHATE 75 MG/1
75 CAPSULE ORAL 2 TIMES DAILY
Qty: 2 CAPSULE | Refills: 0 | Status: SHIPPED | OUTPATIENT
Start: 2019-03-18 | End: 2019-03-19

## 2019-03-18 RX ADMIN — OSELTAMIVIR PHOSPHATE 75 MG: 75 CAPSULE ORAL at 08:20

## 2019-03-18 RX ADMIN — DESVENLAFAXINE SUCCINATE 150 MG: 50 TABLET, EXTENDED RELEASE ORAL at 08:20

## 2019-03-18 RX ADMIN — MOMETASONE FUROATE AND FORMOTEROL FUMARATE DIHYDRATE 2 PUFF: 200; 5 AEROSOL RESPIRATORY (INHALATION) at 08:40

## 2019-03-18 RX ADMIN — ATORVASTATIN CALCIUM 20 MG: 20 TABLET, FILM COATED ORAL at 08:20

## 2019-03-18 RX ADMIN — LAMOTRIGINE 50 MG: 25 TABLET ORAL at 08:19

## 2019-03-18 RX ADMIN — DOXYCYCLINE HYCLATE 100 MG: 100 CAPSULE ORAL at 08:20

## 2019-03-18 RX ADMIN — Medication 10 ML: at 08:21

## 2019-03-18 RX ADMIN — LISINOPRIL 20 MG: 20 TABLET ORAL at 08:20

## 2019-03-18 RX ADMIN — CARBIDOPA AND LEVODOPA 2 TABLET: 25; 100 TABLET, EXTENDED RELEASE ORAL at 08:20

## 2019-03-18 RX ADMIN — AMLODIPINE BESYLATE 2.5 MG: 2.5 TABLET ORAL at 08:20

## 2019-03-18 RX ADMIN — PANTOPRAZOLE SODIUM 40 MG: 40 TABLET, DELAYED RELEASE ORAL at 06:20

## 2019-03-19 ENCOUNTER — CARE COORDINATION (OUTPATIENT)
Dept: CASE MANAGEMENT | Age: 69
End: 2019-03-19

## 2019-03-19 LAB
BLOOD CULTURE, ROUTINE: NORMAL
CULTURE, BLOOD 2: NORMAL

## 2019-03-22 ENCOUNTER — TELEPHONE (OUTPATIENT)
Dept: NEUROLOGY | Age: 69
End: 2019-03-22

## 2019-03-22 DIAGNOSIS — G20 PARKINSON DISEASE (HCC): Primary | ICD-10-CM

## 2019-03-26 RX ORDER — ROPINIROLE 0.5 MG/1
TABLET, FILM COATED ORAL
Qty: 180 TABLET | Refills: 3 | Status: SHIPPED | OUTPATIENT
Start: 2019-03-26 | End: 2019-07-09 | Stop reason: SDUPTHER

## 2019-03-26 RX ORDER — ROPINIROLE 0.5 MG/1
0.5 TABLET, FILM COATED ORAL 2 TIMES DAILY
Qty: 60 TABLET | Refills: 3 | Status: SHIPPED | OUTPATIENT
Start: 2019-03-26 | End: 2019-03-26 | Stop reason: SDUPTHER

## 2019-04-03 ENCOUNTER — TELEPHONE (OUTPATIENT)
Dept: NEUROLOGY | Age: 69
End: 2019-04-03

## 2019-04-03 NOTE — TELEPHONE ENCOUNTER
She can try low dose benadryl with caution as it can cause confusion in older adults. Please make sure she is not having any hives, swelling, or s/s anaphylaxis. If so, she must stop the Requip.

## 2019-04-03 NOTE — TELEPHONE ENCOUNTER
MA spoke with pt regarding Memo's recommendations and pt understood. Pt denies swelling, hives, s/s of anaphylaxis. Pt will be cautious with Benadryl and stop the med and call the office if she develops any confusion w/the med.   Electronically signed by Anders Mancuso on 4/3/19 at 10:33 AM

## 2019-04-03 NOTE — TELEPHONE ENCOUNTER
Patient called in stating that she started taking her ropinirole on 3/26/2019. She stated that it works great except that it is making her itch. She stated she has no other symptoms. She is wanting to know if she could take benadryl for this? Please advise.    Electronically signed by Judah Salgado MA on 4/3/2019 at 10:12 AM

## 2019-04-05 ENCOUNTER — CARE COORDINATION (OUTPATIENT)
Dept: CASE MANAGEMENT | Age: 69
End: 2019-04-05

## 2019-04-05 NOTE — CARE COORDINATION
[facility] Transitions Follow Up Call    2019    Patient: Milka Velásquez  Patient : 1950   MRN: <[id number]>  Reason for Admission:   Discharge Date: 3/18/19 RARS: Readmission Risk Score: 12             Care Transitions Subsequent and Final Call    Subsequent and Final Calls  Care Transitions Interventions  Other Interventions: Follow Up : Unable to reach patient for follow up call for BPCI-A. Voice message left with my contact information for return call. Will re attempt at later time.     Future Appointments   Date Time Provider Prateek Nassar   2019  2:15 PM DO Aubrey Tolentino [facility]   2019  3:20 PM RASHARD Valentin CNP [facility]   2019  9:40 AM MD ANSON Day IM ANSON Cui IM       Brigitte Sutton RN

## 2019-04-19 ENCOUNTER — CARE COORDINATION (OUTPATIENT)
Dept: CASE MANAGEMENT | Age: 69
End: 2019-04-19

## 2019-04-19 NOTE — CARE COORDINATION
[facility] Transitions Follow Up Call    2019    Patient: Leyla Cortez  Patient : 1950   MRN: <[id number]>  Reason for Admission:   Discharge Date: 3/18/19 RARS: Readmission Risk Score: 12         Spoke with: [id number] Transitions Subsequent and Final Call    Subsequent and Final Calls  Do you have any ongoing symptoms?:  No  Have your medications changed?:  No  Do you have any questions related to your medications?:  No  Do you currently have any active services?:  No  Do you have any needs or concerns that I can assist you with?:  No  Identified Barriers:  None  Care Transitions Interventions  Other Interventions:          CTC spoke to Aida Gibbons for BPCI f/u call. Pt stated she is doing OK since discharge. Denies increased SOB, cough, fever, chills. Stated her BG has been a little high at times but usually r/t when she doesn't follow diabetic diet closely. Stated she is trying to watch what she eats. Pt has no questions or concerns at this time. Stated she has all her medications and taking as directed.     Brigitte Mijares RN BSN   Care Transitions Coordinator  [phone]     Follow Up  Future Appointments   Date Time Provider Prateek Nassar   2019  3:20 PM RASHARD Tristan - CNP [facility]   2019  9:40 AM MD ANSON Rogers RN

## 2019-05-01 ENCOUNTER — OFFICE VISIT (OUTPATIENT)
Dept: NEUROLOGY | Age: 69
End: 2019-05-01
Payer: MEDICARE

## 2019-05-01 VITALS
HEART RATE: 89 BPM | RESPIRATION RATE: 12 BRPM | HEIGHT: 61 IN | DIASTOLIC BLOOD PRESSURE: 82 MMHG | OXYGEN SATURATION: 94 % | BODY MASS INDEX: 41.35 KG/M2 | TEMPERATURE: 98 F | SYSTOLIC BLOOD PRESSURE: 150 MMHG | WEIGHT: 219 LBS

## 2019-05-01 DIAGNOSIS — G20 PARKINSON DISEASE (HCC): Primary | ICD-10-CM

## 2019-05-01 PROCEDURE — 4040F PNEUMOC VAC/ADMIN/RCVD: CPT | Performed by: NURSE PRACTITIONER

## 2019-05-01 PROCEDURE — 1036F TOBACCO NON-USER: CPT | Performed by: NURSE PRACTITIONER

## 2019-05-01 PROCEDURE — 1090F PRES/ABSN URINE INCON ASSESS: CPT | Performed by: NURSE PRACTITIONER

## 2019-05-01 PROCEDURE — 1123F ACP DISCUSS/DSCN MKR DOCD: CPT | Performed by: NURSE PRACTITIONER

## 2019-05-01 PROCEDURE — 3017F COLORECTAL CA SCREEN DOC REV: CPT | Performed by: NURSE PRACTITIONER

## 2019-05-01 PROCEDURE — G8399 PT W/DXA RESULTS DOCUMENT: HCPCS | Performed by: NURSE PRACTITIONER

## 2019-05-01 PROCEDURE — G8417 CALC BMI ABV UP PARAM F/U: HCPCS | Performed by: NURSE PRACTITIONER

## 2019-05-01 PROCEDURE — G8427 DOCREV CUR MEDS BY ELIG CLIN: HCPCS | Performed by: NURSE PRACTITIONER

## 2019-05-01 PROCEDURE — 99214 OFFICE O/P EST MOD 30 MIN: CPT | Performed by: NURSE PRACTITIONER

## 2019-05-01 NOTE — PROGRESS NOTES
[address] [facility]. Elder Degroot M.D., F.A.C.P. Samuel Gallagher, MONY, APRN, CNS  Raiza Styles. aRshmi Marie, MSN, APRN-FNP-C  Randy Mejía MSN, APRN, FNP-C  Maximo CASANOVA, CHAITANYA  [address] MSN, APRN, FNP-C  [address], [facility]  [place], [address] [address]  Phone: [phone]  Fax: [phone]       Kleber Gomez is a 70 y.o. right handed woman. We are following her for Parkinson's disease    She is accompanied by her son today and remains a good historian    She had been doing well on Neupro 4 mg patches, but began suffering with skin irritation and rash at the patch site---she was switched to Requip 5 mg BID and has been still doing well on this, atop Sinemet CR 50/200 TID. Minimal resting hand tremors and no freezing spells or bradykinesias. She suffered one fall due to reaching up for something with both hands and losing balance. Light headedness when sitting up quickly. She is now living with her son in [facility] to start driving again. She has been doing some walking for exercise but no formal program. Eating and drinking well. No memory or mood issues    She has not been wearing her CPAP for her SERENA, telling me her equipment is in storage. She continues to follow with psych--is on Pristiq and Lamictal. DM is well controlled with A1C 6.8 in March    No chest pain or palpitations  No SOB  No vertigo, lightheadedness or loss of consciousness  No incontinence of bowels or bladder  No itching or bruising appreciated  No numbness, tingling or focal arm/leg weakness    ROS otherwise negative     Medications:     Prior to Admission medications    Medication Sig Start Date End Date Taking?  Authorizing Provider   rOPINIRole (REQUIP) 0.5 MG tablet TAKE 1 TABLET BY MOUTH TWICE DAILY 3/26/19  Yes RASHARD Madden - CNP   FREESTYLE LANCETS MISC TEST DAILY AS DIRECTED 3/20/19  Yes Chris Herbert MD   albuterol sulfate  (90 Base) MCG/ACT inhaler Inhale 2 puffs into the lungs every 6 hours as needed for Wheezing or Shortness of Breath   Yes Historical Provider, MD   lamoTRIgine (LAMICTAL) 25 MG tablet Take 50 mg by mouth every morning   Yes Historical Provider, MD   aspirin 81 MG tablet Take 81 mg by mouth nightly   Yes Historical Provider, MD   traZODone (DESYREL) 50 MG tablet TAKE 25 MG BY MOUTH NIGHTLY 3/6/19  Yes Historical Provider, MD   Insulin Pen Needle (B-D ULTRAFINE III SHORT PEN) 31G X 8 MM MISC USE AS DIRECTED 3/13/19  Yes Esther Bullock MD   omeprazole (PRILOSEC) 40 MG delayed release capsule Take 1 capsule by mouth daily  Patient taking differently: Take 40 mg by mouth every morning (before breakfast)  3/11/19  Yes Esther Bullock MD   [facility] 115-21 MCG/ACT inhaler Inhale 2 puffs into the lungs 2 times daily  Patient taking differently: Inhale 2 puffs into the lungs 2 times daily as needed  2/18/19  Yes Esther Bullock MD   amLODIPine (NORVASC) 2.5 MG tablet Take 1 tablet by mouth daily  Patient taking differently: Take 2.5 mg by mouth every morning  2/4/19  Yes Esther Bullock MD   insulin glargine (BASAGLAR KWIKPEN) 100 UNIT/ML injection pen Inject 90 Units into the skin nightly  Patient taking differently: Inject 80 Units into the skin nightly  1/24/19 5/1/19 Yes Esther Bullock MD   lisinopril (PRINIVIL;ZESTRIL) 20 MG tablet Take 1 tablet by mouth daily  Patient taking differently: Take 20 mg by mouth every morning  12/24/18 5/1/19 Yes RASHARD Meza CNP   carbidopa-levodopa (SINEMET CR)  MG per extended release tablet Take 1 tablet by mouth 3 times daily 11/20/18  Yes RASHARD Noriega CNP   montelukast (SINGULAIR) 10 MG tablet TAKE 1 TABLET BY MOUTH EVERY NIGHT 11/19/18  Yes Esther Bullock MD   atorvastatin (LIPITOR) 20 MG tablet Take 1 tablet by mouth daily  Patient taking differently: Take 20 mg by mouth nightly  10/23/18  Yes Esther Bullock MD   [facility] Michelle extension strength  5/5   XII: tongue strength  Normal     No Myerson's    Motor:  5/5 throughout  No drift   Very minimal resting tremors of both hands L>R  No cogwheeling or bradykinesias today    Sensory:  LT normal    Coordination:   FN, FFM, RAMY  normal    Gait:  No shuffling or festination  Improved arm swinging  Pull test score of 1    DTR:   Right Brachioradialis reflex 1+  Left Brachioradialis reflex 1+  Right Biceps reflex 1+  Left Biceps reflex 1+  Right Triceps reflex 1+  Left Triceps reflex 1+  Right Quadriceps reflex 1+  Left Quadriceps reflex 1+  Right Achilles reflex 1+  Left Achilles reflex 1+    No Kohler's or pathological reflexes    Laboratory/Radiology:  ry/Radiology:     Lab Results   Component Value Date     03/18/2019    K 3.8 03/18/2019     03/18/2019    CO2 31 (H) 03/18/2019    BUN 22 03/18/2019    CREATININE 0.7 03/18/2019    GLUCOSE 98 03/18/2019    CALCIUM 8.9 03/18/2019    PROT 7.2 03/14/2019    LABALBU 3.9 03/14/2019    BILITOT 0.4 03/14/2019    ALKPHOS 94 03/14/2019    AST 28 03/14/2019    ALT 7 03/14/2019    LABGLOM >60 03/18/2019    GFRAA >60 03/18/2019       Lab Results   Component Value Date    WBC 8.2 03/15/2019    HGB 12.0 03/15/2019    HCT 39.0 03/15/2019    MCV 83.5 03/15/2019     03/15/2019     Lab Results   Component Value Date    LABA1C 6.8 (A) 03/13/2019     All labs and images were personally reviewed at the time of this visit    Assessment:     The patient suffers from Parkinson's disease   Well controlled on Requip and Sinemet CR   Minimal resting hand tremors on exam today and no significant non-motor s/s   Will send for driving assessment as she is desiring more independence    SERENA   Noncompliant with CPAP   Discussed health risks and encouraged compliance    Discussed the importance of a daily exercise program for Parkinson's including cardio, strength, flexibility, and balance training. She may benefit from one of the local programs.  Lifestyle modifications were reviewed including daily healthy diet, sleep hygiene, and stress management.     Medically, her other comorbidities: HTN, HLD, DM, CKD, anxiety, and depression are well controlled   Follows with counseling   Question if Lamictal caused any of her skin eruptions in March    Plan:     Continue Sinemet CR 50/200 mg TID and Requip 0.5 mg BID    Referral to [facility] for driving assessment    Information about Delay the Disease and [employer] given on AVS    RTO in 6 months or sooner RASHARD Ordoñez, FNP-C  3:31 PM  5/1/2019

## 2019-05-01 NOTE — PATIENT INSTRUCTIONS
Patient Education        Parkinson's disease support group  [facility]  2nd Thurs month at [address]      [facility]  Delay the Disease Exercise for Parkinson's  [address] [place]  [place],  [facility] [place] [address]  P:[phone]  F:[phone]  [email or web address] [facility] [employer]  [facility]  [phone]                ropinirole (oral)  Pronunciation:  farrukh EDUARDO i role  Brand:  Requip, Requip XL  What is the most important information I should know about ropinirole? Follow all directions on your medicine label and package. Tell each of your healthcare providers about all your medical conditions, allergies, and all medicines you use. What is ropinirole? Ropinirole has some of the same effects as a chemical called dopamine, which occurs naturally in your body. Low levels of dopamine in the brain are associated with Parkinson's disease. Ropinirole is used to treat symptoms of Parkinson's disease (stiffness, tremors, muscle spasms, and poor muscle control). Ropinirole is also used to treat restless legs syndrome (RLS). Only immediate-release ropinirole (Requip) is approved to treat either Parkinson symptoms or RLS. Extended-release ropinirole (Requip XL) is approved only to treat Parkinson symptoms. Parkinson's and RLS are two separate disorders. Having one of these conditions will not cause you to have the other condition. Ropinirole may also be used for purposes not listed in this medication guide. What should I discuss with my healthcare provider before taking ropinirole? You should not use ropinirole if you are allergic to it.   To make sure ropinirole is safe for you, tell your doctor if you have:  · high or low blood pressure;  · kidney disease (or if you are on dialysis);  · heart disease, heart rhythm problems;  · a sleep disorder such as narcolepsy, or other conditions that may cause daytime sleepiness; or  · if you smoke. People with Parkinson's disease may have a higher risk of skin cancer (melanoma). Talk to your doctor about this risk and what skin symptoms to watch for. It is not known whether this medicine will harm an unborn baby. Tell your doctor if you are pregnant or plan to become pregnant. It is not known whether ropinirole passes into breast milk or if it could affect the nursing baby. Ropinirole may slow breast milk production. Tell your doctor if you are breast-feeding. Ropinirole is not approved for use by anyone younger than 25years old. How should I take ropinirole? Follow all directions on your prescription label. Your doctor may occasionally change your dose. Do not take this medicine in larger or smaller amounts or for longer than recommended. If you are taking immediate-release ropinirole (Requip) you should not take extended-release ropinirole (Requip XL) at the same time. The dose and timing of ropinirole in treating Parkinson's disease is different from the dose and timing in treating RLS. Follow the directions on your prescription label. Ask your pharmacist if you have any questions about the kind of ropinirole you receive at the pharmacy. Ropinirole can be taken with or without food. Take the medicine at the same time each day. Do not crush, chew, or break an extended-release tablet (Requip XL). Swallow it whole. Call your doctor if you see part of the ropinirole tablet in your stool. This is a sign that your body may not have absorbed all of the medicine. If you are taking this medicine for RLS, tell your doctor if your symptoms get worse, if they occur in the morning or earlier than usual in the evening, or if you feel restless symptoms in your hands or arms. It may take up to several weeks before your symptoms improve. Keep using the medication as directed and tell your doctor if your symptoms do not improve.   Do not stop using ropinirole drug information is an informational resource designed to assist licensed healthcare practitioners in caring for their patients and/or to serve consumers viewing this service as a supplement to, and not a substitute for, the expertise, skill, knowledge and judgment of healthcare practitioners. The absence of a warning for a given drug or drug combination in no way should be construed to indicate that the drug or drug combination is safe, effective or appropriate for any given patient. [facility] does not assume any responsibility for any aspect of healthcare administered with the aid of information [facility] provides. The information contained herein is not intended to cover all possible uses, directions, precautions, warnings, drug interactions, allergic reactions, or adverse effects. If you have questions about the drugs you are taking, check with your doctor, nurse or pharmacist.  Copyright 5001-6984 [address]. Version: 14.01. Revision date: 3/27/2017. Care instructions adapted under license by [facility] ([facility]). If you have questions about a medical condition or this instruction, always ask your healthcare professional. [place] [address] any warranty or liability for your use of this information.

## 2019-05-02 ENCOUNTER — TELEPHONE (OUTPATIENT)
Dept: NEUROLOGY | Age: 69
End: 2019-05-02

## 2019-05-06 ENCOUNTER — TELEPHONE (OUTPATIENT)
Dept: NEUROLOGY | Age: 69
End: 2019-05-06

## 2019-05-06 NOTE — TELEPHONE ENCOUNTER
Pt called inquiring on if there is something she can use OTC for her hoarseness. Pt has had no relief w/increasing fluid intake. MA advised pt the message would be routed to RASHARD Chand, for advisement but she is out of town until 5/13 and pt understood.   Electronically signed by Theodore Montemayor on 5/6/19 at 3:00 PM

## 2019-05-10 NOTE — TELEPHONE ENCOUNTER
MA informed pt of Memo's response and pt understood. Pt declines therapy at this time but will call if sx worsen.   Electronically signed by Doyle Baron on 5/10/19 at 1:24 PM

## 2019-05-10 NOTE — TELEPHONE ENCOUNTER
The hoarseness is probably due to her Parkinson's causing slowing and changes in her vocal cords. Since her exam was so good at her last visit, I don't want to change her meds right now and give her side effects unless absolutely necessary. We could consider the [facility] LOUD therapy in [place] if she is agreeable.

## 2019-05-13 ENCOUNTER — CARE COORDINATION (OUTPATIENT)
Dept: CASE MANAGEMENT | Age: 69
End: 2019-05-13

## 2019-05-13 NOTE — CARE COORDINATION
[facility] Transitions Follow Up Call    2019    Patient: Noy Montanez  Patient : 1950   MRN: <[id number]>  Reason for Admission: pneumonia  Discharge Date: 3/18/19 RARS: Readmission Risk Score: 12         Spoke with: Shawn Pierre 124 Transitions Subsequent and Final Call    Subsequent and Final Calls  Do you have any ongoing symptoms?:  No  Have your medications changed?:  No  Do you have any questions related to your medications?:  No  Do you currently have any active services?:  No  Do you have any needs or concerns that I can assist you with?:  No  Care Transitions Interventions  Other Interventions:        States she's doing well. Reports no problems breathing, and denies any fevers, fatigue or CP. States she does continue with an occasional  non-productive cough. States she completed her antibiotics and is feeling well otherwise. Denies questions or concerns at this time.      Follow Up  Future Appointments   Date Time Provider Prateek Nassar   2019  9:40 AM MD ANSON [place]  ANSON Cui    2019  8:00 AM RASHARD Noriega - CNP [facility]       Karina Goldstein

## 2019-05-22 ENCOUNTER — HOSPITAL ENCOUNTER (OUTPATIENT)
Age: 69
Discharge: HOME | End: 2019-05-24
Payer: MEDICARE

## 2019-05-22 DIAGNOSIS — N30.00 ACUTE CYSTITIS WITHOUT HEMATURIA: ICD-10-CM

## 2019-05-22 LAB
ALBUMIN SERPL-MCNC: 4 G/DL (ref 3.5–5.2)
ALP BLD-CCNC: 84 U/L (ref 35–104)
ALT SERPL-CCNC: <5 U/L (ref 0–32)
ANION GAP SERPL CALCULATED.3IONS-SCNC: 14 MMOL/L (ref 7–16)
AST SERPL-CCNC: 17 U/L (ref 0–31)
BASOPHILS ABSOLUTE: 0.06 E9/L (ref 0–0.2)
BASOPHILS RELATIVE PERCENT: 0.7 % (ref 0–2)
BILIRUB SERPL-MCNC: 0.3 MG/DL (ref 0–1.2)
BUN BLDV-MCNC: 19 MG/DL (ref 8–23)
CALCIUM SERPL-MCNC: 8.9 MG/DL (ref 8.6–10.2)
CHLORIDE BLD-SCNC: 97 MMOL/L (ref 98–107)
CO2: 26 MMOL/L (ref 22–29)
CREAT SERPL-MCNC: 0.8 MG/DL (ref 0.5–1)
EOSINOPHILS ABSOLUTE: 0.11 E9/L (ref 0.05–0.5)
EOSINOPHILS RELATIVE PERCENT: 1.2 % (ref 0–6)
GFR AFRICAN AMERICAN: >60
GFR NON-AFRICAN AMERICAN: >60 ML/MIN/1.73
GLUCOSE BLD-MCNC: 129 MG/DL (ref 74–99)
HCT VFR BLD CALC: 40 % (ref 34–48)
HEMOGLOBIN: 11.8 G/DL (ref 11.5–15.5)
IMMATURE GRANULOCYTES #: 0.05 E9/L
IMMATURE GRANULOCYTES %: 0.6 % (ref 0–5)
LYMPHOCYTES ABSOLUTE: 1.85 E9/L (ref 1.5–4)
LYMPHOCYTES RELATIVE PERCENT: 20.5 % (ref 20–42)
MCH RBC QN AUTO: 25.4 PG (ref 26–35)
MCHC RBC AUTO-ENTMCNC: 29.5 % (ref 32–34.5)
MCV RBC AUTO: 86 FL (ref 80–99.9)
MONOCYTES ABSOLUTE: 0.46 E9/L (ref 0.1–0.95)
MONOCYTES RELATIVE PERCENT: 5.1 % (ref 2–12)
NEUTROPHILS ABSOLUTE: 6.51 E9/L (ref 1.8–7.3)
NEUTROPHILS RELATIVE PERCENT: 71.9 % (ref 43–80)
PDW BLD-RTO: 15.3 FL (ref 11.5–15)
PLATELET # BLD: 255 E9/L (ref 130–450)
PMV BLD AUTO: 10.1 FL (ref 7–12)
POTASSIUM SERPL-SCNC: 4.1 MMOL/L (ref 3.5–5)
RBC # BLD: 4.65 E12/L (ref 3.5–5.5)
SODIUM BLD-SCNC: 137 MMOL/L (ref 132–146)
TOTAL PROTEIN: 7 G/DL (ref 6.4–8.3)
WBC # BLD: 9 E9/L (ref 4.5–11.5)

## 2019-05-22 PROCEDURE — 80053 COMPREHEN METABOLIC PANEL: CPT

## 2019-05-22 PROCEDURE — 87088 URINE BACTERIA CULTURE: CPT

## 2019-05-22 PROCEDURE — 85025 COMPLETE CBC W/AUTO DIFF WBC: CPT

## 2019-05-25 LAB — URINE CULTURE, ROUTINE: NORMAL

## 2019-05-29 ENCOUNTER — APPOINTMENT (OUTPATIENT)
Dept: CT IMAGING | Age: 69
End: 2019-05-29
Payer: MEDICARE

## 2019-05-29 ENCOUNTER — HOSPITAL ENCOUNTER (EMERGENCY)
Age: 69
Discharge: HOME | End: 2019-05-29
Payer: MEDICARE

## 2019-05-29 VITALS
OXYGEN SATURATION: 96 % | TEMPERATURE: 98.6 F | HEIGHT: 61 IN | DIASTOLIC BLOOD PRESSURE: 79 MMHG | RESPIRATION RATE: 16 BRPM | BODY MASS INDEX: 41.35 KG/M2 | SYSTOLIC BLOOD PRESSURE: 178 MMHG | WEIGHT: 219 LBS | HEART RATE: 88 BPM

## 2019-05-29 DIAGNOSIS — R10.31 ABDOMINAL PAIN, RIGHT LOWER QUADRANT: Primary | ICD-10-CM

## 2019-05-29 LAB
ALBUMIN SERPL-MCNC: 4.3 G/DL (ref 3.5–5.2)
ALP BLD-CCNC: 97 U/L (ref 35–104)
ALT SERPL-CCNC: <5 U/L (ref 0–32)
ANION GAP SERPL CALCULATED.3IONS-SCNC: 11 MMOL/L (ref 7–16)
AST SERPL-CCNC: 13 U/L (ref 0–31)
BACTERIA: ABNORMAL /HPF
BASOPHILS ABSOLUTE: 0.06 E9/L (ref 0–0.2)
BASOPHILS RELATIVE PERCENT: 0.8 % (ref 0–2)
BILIRUB SERPL-MCNC: 0.2 MG/DL (ref 0–1.2)
BILIRUBIN URINE: NEGATIVE
BLOOD, URINE: NEGATIVE
BUN BLDV-MCNC: 16 MG/DL (ref 8–23)
CALCIUM SERPL-MCNC: 8.8 MG/DL (ref 8.6–10.2)
CHLORIDE BLD-SCNC: 102 MMOL/L (ref 98–107)
CLARITY: CLEAR
CO2: 27 MMOL/L (ref 22–29)
COLOR: ABNORMAL
CREAT SERPL-MCNC: 0.8 MG/DL (ref 0.5–1)
EKG ATRIAL RATE: 87 BPM
EKG P AXIS: 56 DEGREES
EKG P-R INTERVAL: 188 MS
EKG Q-T INTERVAL: 368 MS
EKG QRS DURATION: 78 MS
EKG QTC CALCULATION (BAZETT): 442 MS
EKG R AXIS: 64 DEGREES
EKG T AXIS: 37 DEGREES
EKG VENTRICULAR RATE: 87 BPM
EOSINOPHILS ABSOLUTE: 0.1 E9/L (ref 0.05–0.5)
EOSINOPHILS RELATIVE PERCENT: 1.3 % (ref 0–6)
GFR AFRICAN AMERICAN: >60
GFR NON-AFRICAN AMERICAN: >60 ML/MIN/1.73
GLUCOSE BLD-MCNC: 139 MG/DL (ref 74–99)
GLUCOSE URINE: NEGATIVE MG/DL
HCT VFR BLD CALC: 38.9 % (ref 34–48)
HEMOGLOBIN: 12 G/DL (ref 11.5–15.5)
IMMATURE GRANULOCYTES #: 0.03 E9/L
IMMATURE GRANULOCYTES %: 0.4 % (ref 0–5)
KETONES, URINE: NEGATIVE MG/DL
LACTIC ACID: 1.6 MMOL/L (ref 0.5–2.2)
LEUKOCYTE ESTERASE, URINE: ABNORMAL
LIPASE: 18 U/L (ref 13–60)
LYMPHOCYTES ABSOLUTE: 1.8 E9/L (ref 1.5–4)
LYMPHOCYTES RELATIVE PERCENT: 23.4 % (ref 20–42)
MCH RBC QN AUTO: 26.1 PG (ref 26–35)
MCHC RBC AUTO-ENTMCNC: 30.8 % (ref 32–34.5)
MCV RBC AUTO: 84.6 FL (ref 80–99.9)
MONOCYTES ABSOLUTE: 0.4 E9/L (ref 0.1–0.95)
MONOCYTES RELATIVE PERCENT: 5.2 % (ref 2–12)
NEUTROPHILS ABSOLUTE: 5.31 E9/L (ref 1.8–7.3)
NEUTROPHILS RELATIVE PERCENT: 68.9 % (ref 43–80)
NITRITE, URINE: NEGATIVE
PDW BLD-RTO: 14.8 FL (ref 11.5–15)
PH UA: 7 (ref 5–9)
PLATELET # BLD: 232 E9/L (ref 130–450)
PMV BLD AUTO: 10.1 FL (ref 7–12)
POTASSIUM SERPL-SCNC: 3.6 MMOL/L (ref 3.5–5)
PROTEIN UA: ABNORMAL MG/DL
RBC # BLD: 4.6 E12/L (ref 3.5–5.5)
RBC UA: ABNORMAL /HPF (ref 0–2)
SODIUM BLD-SCNC: 140 MMOL/L (ref 132–146)
SPECIFIC GRAVITY UA: <=1.005 (ref 1–1.03)
TOTAL PROTEIN: 6.6 G/DL (ref 6.4–8.3)
TROPONIN: <0.01 NG/ML (ref 0–0.03)
UROBILINOGEN, URINE: 0.2 E.U./DL
WBC # BLD: 7.7 E9/L (ref 4.5–11.5)
WBC UA: ABNORMAL /HPF (ref 0–5)

## 2019-05-29 PROCEDURE — 93005 ELECTROCARDIOGRAM TRACING: CPT | Performed by: PHYSICIAN ASSISTANT

## 2019-05-29 PROCEDURE — 84484 ASSAY OF TROPONIN QUANT: CPT

## 2019-05-29 PROCEDURE — 85025 COMPLETE CBC W/AUTO DIFF WBC: CPT

## 2019-05-29 PROCEDURE — 6360000002 HC RX W HCPCS: Performed by: PHYSICIAN ASSISTANT

## 2019-05-29 PROCEDURE — 99284 EMERGENCY DEPT VISIT MOD MDM: CPT

## 2019-05-29 PROCEDURE — 83690 ASSAY OF LIPASE: CPT

## 2019-05-29 PROCEDURE — 74177 CT ABD & PELVIS W/CONTRAST: CPT

## 2019-05-29 PROCEDURE — 6360000004 HC RX CONTRAST MEDICATION: Performed by: RADIOLOGY

## 2019-05-29 PROCEDURE — 2580000003 HC RX 258: Performed by: PHYSICIAN ASSISTANT

## 2019-05-29 PROCEDURE — 96374 THER/PROPH/DIAG INJ IV PUSH: CPT

## 2019-05-29 PROCEDURE — 80053 COMPREHEN METABOLIC PANEL: CPT

## 2019-05-29 PROCEDURE — 83605 ASSAY OF LACTIC ACID: CPT

## 2019-05-29 PROCEDURE — 81001 URINALYSIS AUTO W/SCOPE: CPT

## 2019-05-29 PROCEDURE — 93010 ELECTROCARDIOGRAM REPORT: CPT | Performed by: INTERNAL MEDICINE

## 2019-05-29 RX ORDER — KETOROLAC TROMETHAMINE 30 MG/ML
15 INJECTION, SOLUTION INTRAMUSCULAR; INTRAVENOUS ONCE
Status: COMPLETED | OUTPATIENT
Start: 2019-05-29 | End: 2019-05-29

## 2019-05-29 RX ORDER — 0.9 % SODIUM CHLORIDE 0.9 %
1000 INTRAVENOUS SOLUTION INTRAVENOUS ONCE
Status: COMPLETED | OUTPATIENT
Start: 2019-05-29 | End: 2019-05-29

## 2019-05-29 RX ORDER — IBUPROFEN 600 MG/1
600 TABLET ORAL EVERY 6 HOURS PRN
Qty: 12 TABLET | Refills: 0 | Status: SHIPPED | OUTPATIENT
Start: 2019-05-29 | End: 2019-06-17 | Stop reason: CLARIF

## 2019-05-29 RX ORDER — SODIUM CHLORIDE 0.9 % (FLUSH) 0.9 %
10 SYRINGE (ML) INJECTION ONCE
Status: DISCONTINUED | OUTPATIENT
Start: 2019-05-29 | End: 2019-05-29 | Stop reason: HOSPADM

## 2019-05-29 RX ADMIN — KETOROLAC TROMETHAMINE 15 MG: 30 INJECTION, SOLUTION INTRAMUSCULAR; INTRAVENOUS at 17:40

## 2019-05-29 RX ADMIN — IOPAMIDOL 110 ML: 755 INJECTION, SOLUTION INTRAVENOUS at 20:03

## 2019-05-29 RX ADMIN — SODIUM CHLORIDE 1000 ML: 9 INJECTION, SOLUTION INTRAVENOUS at 17:39

## 2019-05-29 ASSESSMENT — PAIN SCALES - GENERAL
PAINLEVEL_OUTOF10: 3
PAINLEVEL_OUTOF10: 6
PAINLEVEL_OUTOF10: 3

## 2019-05-29 ASSESSMENT — PAIN DESCRIPTION - ORIENTATION: ORIENTATION: RIGHT

## 2019-05-29 ASSESSMENT — PAIN DESCRIPTION - LOCATION: LOCATION: FLANK

## 2019-05-29 ASSESSMENT — PAIN DESCRIPTION - PAIN TYPE: TYPE: ACUTE PAIN

## 2019-05-29 ASSESSMENT — PAIN DESCRIPTION - PROGRESSION: CLINICAL_PROGRESSION: GRADUALLY IMPROVING

## 2019-05-29 ASSESSMENT — PAIN DESCRIPTION - DESCRIPTORS: DESCRIPTORS: THROBBING;CRAMPING;BURNING

## 2019-05-29 ASSESSMENT — PAIN DESCRIPTION - FREQUENCY: FREQUENCY: CONTINUOUS

## 2019-05-29 NOTE — ED NOTES
This nurse notified ERIC Christensen that iv infiltrated x 2. Ultrasound guided requested and will be attempted at this time. Pt did recive approx 200cc of NSS and toradol.      Adam Casanova RN  05/29/19 1943

## 2019-05-29 NOTE — ED PROVIDER NOTES
[facility]       Department of Emergency Medicine   ED  Provider Note  Admit Date/RoomTime: 5/29/2019  3:50 PM  ED Room: 07/07  Chief Complaint:       Flank Pain (on right side, started a couple days ago. ) and Dysuria (started last night)    History of Present Illness   Source of history provided by:  patient. History/Exam Limitations: none. Awilda Keith is a 70 y.o. old female who has a past medical history of:   Past Medical History:   Diagnosis Date    Anxiety     Asthma     CAD (coronary artery disease) 1/21/2016    Cancer ([facility] [address]  breast ca 2006    Chronic kidney disease     nephrolithiasis    Depression     Diabetes mellitus ([facility] [address]     H/O mammogram     Hx MRSA infection     toe infection january 2012    Hyperlipidemia     Hypertension     Lateral epicondylitis     SERENA on CPAP     Tubal ligation status     presents to the emergency department by private vehicle, for complaints of gradual onset, intermittent episodes aching, sharp pain in the RLQ without radiation which began 1 week(s) prior to arrival. She saw her PCP and they told her she had a UTI and she was placed on bactrim but states she forgot some doses. She states she had some burning with urination yesterday but nothing today. She has chronic low back pain and states it has been flared up over the last week. Has no history of AAA. Denies diarrhea, vomiting, constipation, dark or tarry stools, chest pain, SOB. States her pain is worse with movement, like sitting and standing. Denies known injury or heavy lifting prior to pain beginning. There has been no similar episodes in the past.    ROS   Pertinent positives and negatives are stated within HPI, all other systems reviewed and are negative.     Past Surgical History:   Procedure Laterality Date    BREAST LUMPECTOMY      BREAST REDUCTION SURGERY      CARDIAC CATHETERIZATION  4/28/2014    Dr. Chao Hamilton  7/29/15    ENDOSCOPY, COLON, DIAGNOSTIC  7/19/15    GALLBLADDER SURGERY      LUMBAR LAMINECTOMY      TOE AMPUTATION      TONSILLECTOMY      UPPER GASTROINTESTINAL ENDOSCOPY     Social History:  reports that she has never smoked. She has never used smokeless tobacco. She reports that she does not drink alcohol or use drugs. Family History: family history includes Cancer in her father and mother; Diabetes in her sister; Heart Disease in her sister; Other in her brother; Seizures in her son. Allergies: Ciprofloxacin and Codeine    Physical Exam            ED Triage Vitals [05/29/19 1552]   BP Temp Temp src Pulse Resp SpO2 Height Weight   (!) 185/80 98.7 °F (37.1 °C) -- 91 16 96 % 5' 1\" (1.549 m) 219 lb (99.3 kg)      Oxygen Saturation Interpretation: Normal.    · General Appearance/Constitutional:  Alert, development consistent with age  · HEENT:  NC/NT. PERRLA. Airway patent. · Neck:  Supple. No lymphadenopathy. · Respiratory:  No retractions. Lungs Clear to auscultation and breath sounds equal.  · CV:  Regular rate and rhythm. · GI:  General Appearance: Obese abd. Bowel sounds: normal bowel sounds. Distension:  None. Tenderness: moderate tenderness is present in the RLQ. Liver: non-tender. Spleen:  non-tender. Pulsatile Mass: absent. · Back: CVA Tenderness: No. Tenderness to palpation over the lumbar musculature bilaterally  · Integument:  Normal turgor. Warm, dry, without visible rash, unless noted elsewhere. · Lymphatics: No edema, cap.refill <3sec. · Neurological:  Orientation age-appropriate. Motor functions intact.     Lab / Imaging Results   (All laboratory and radiology results have been personally reviewed by myself)  Labs:  Results for orders placed or performed during the hospital encounter of 05/29/19   CBC Auto Differential   Result Value Ref Range    WBC 7.7 4.5 - 11.5 E9/L    RBC 4.60 3.50 - 5.50 E12/L    Hemoglobin 12.0 11.5 - 15.5 g/dL Hematocrit 38.9 34.0 - 48.0 %    MCV 84.6 80.0 - 99.9 fL    MCH 26.1 26.0 - 35.0 pg    MCHC 30.8 (L) 32.0 - 34.5 %    RDW 14.8 11.5 - 15.0 fL    Platelets 681 015 - 203 E9/L    MPV 10.1 7.0 - 12.0 fL    Neutrophils % 68.9 43.0 - 80.0 %    Immature Granulocytes % 0.4 0.0 - 5.0 %    Lymphocytes % 23.4 20.0 - 42.0 %    Monocytes % 5.2 2.0 - 12.0 %    Eosinophils % 1.3 0.0 - 6.0 %    Basophils % 0.8 0.0 - 2.0 %    Neutrophils # 5.31 1.80 - 7.30 E9/L    Immature Granulocytes # 0.03 E9/L    Lymphocytes # 1.80 1.50 - 4.00 E9/L    Monocytes # 0.40 0.10 - 0.95 E9/L    Eosinophils # 0.10 0.05 - 0.50 E9/L    Basophils # 0.06 0.00 - 0.20 E9/L   Comprehensive Metabolic Panel   Result Value Ref Range    Sodium 140 132 - 146 mmol/L    Potassium 3.6 3.5 - 5.0 mmol/L    Chloride 102 98 - 107 mmol/L    CO2 27 22 - 29 mmol/L    Anion Gap 11 7 - 16 mmol/L    Glucose 139 (H) 74 - 99 mg/dL    BUN 16 8 - 23 mg/dL    CREATININE 0.8 0.5 - 1.0 mg/dL    GFR Non-African American >60 >=60 mL/min/1.73    GFR African American >60     Calcium 8.8 8.6 - 10.2 mg/dL    Total Protein 6.6 6.4 - 8.3 g/dL    Alb 4.3 3.5 - 5.2 g/dL    Total Bilirubin 0.2 0.0 - 1.2 mg/dL    Alkaline Phosphatase 97 35 - 104 U/L    ALT <5 0 - 32 U/L    AST 13 0 - 31 U/L   Lipase   Result Value Ref Range    Lipase 18 13 - 60 U/L   Lactic Acid, Plasma   Result Value Ref Range    Lactic Acid 1.6 0.5 - 2.2 mmol/L   Urinalysis with Microscopic   Result Value Ref Range    Color, UA Straw Straw/Yellow    Clarity, UA Clear Clear    Glucose, Ur Negative Negative mg/dL    Bilirubin Urine Negative Negative    Ketones, Urine Negative Negative mg/dL    Specific Gravity, UA <=1.005 1.005 - 1.030    Blood, Urine Negative Negative    pH, UA 7.0 5.0 - 9.0    Protein, UA TRACE Negative mg/dL    Urobilinogen, Urine 0.2 <2.0 E.U./dL    Nitrite, Urine Negative Negative    Leukocyte Esterase, Urine TRACE (A) Negative    WBC, UA 2-5 0 - 5 /HPF    RBC, UA NONE 0 - 2 /HPF    Bacteria, UA RARE (A) /HPF   Troponin   Result Value Ref Range    Troponin <0.01 0.00 - 0.03 ng/mL   EKG 12 Lead   Result Value Ref Range    Ventricular Rate 87 BPM    Atrial Rate 87 BPM    P-R Interval 188 ms    QRS Duration 78 ms    Q-T Interval 368 ms    QTc Calculation (Bazett) 442 ms    P Axis 56 degrees    R Axis 64 degrees    T Axis 37 degrees     Imaging: All Radiology results interpreted by Radiologist unless otherwise noted. CT ABDOMEN PELVIS W IV CONTRAST Additional Contrast? None   Final Result   There is no evidence of appendicitis, bowel obstruction, perforation,   or focal inflammatory change in the right lower abdomen and right   pelvis. There is some fluid in the distal ileum, but this is nonspecific. There is splenomegaly. There is a prominent right axillary lymph node. Incidental findings listed in the report above are nonacute and of   doubtful acute significance. EKG #1:  Interpreted by emergency department physician unless otherwise noted. Time:  1633    Rate: 87  Rhythm: Sinus. Interpretation: NSR with poor R wave progression and T wave inversion since last EKG on 3/14/19    ED Course / Medical Decision Making     Medications   sodium chloride flush 0.9 % injection 10 mL (has no administration in time range)   0.9 % sodium chloride bolus (1,000 mLs Intravenous New Bag 5/29/19 1739)   ketorolac (TORADOL) injection 15 mg (15 mg Intravenous Given 5/29/19 1740)   iopamidol (ISOVUE-370) 76 % injection 110 mL (110 mLs Intravenous Given 5/29/19 2003)        Re-Evaluations:  5/29/19      Time: 1910  Patients symptoms are improving. Consultations:             None    Procedures:   none    MDM:  Patient presenting for right lower quadrant abdominal pain that is worse when she moves to sit or stand. Patient still has her appendix. EKG showing changes since EKG performed in March. Troponin was ordered and negative.  EKG was discussed with Dr. Agustin Balbuena but patient is having no chest pain, SOB, palpitations and troponin is negative so these are non-acute changes. Lab workup grossly benign. It was noted in her history that she had chronic kidney disease but patient was stating she has never been informed of this. Kidney function was normal so it is questionable whether or not this was an accurate diagnosis. We discussed talking to her PCP about this. CT showing no acute findings. I did discuss the splenomegaly with the patient but I do not think this is contributing to her symptoms that she was nontender in the left upper quadrant. We also discussed the renal atrophy. After excluding other etiologies, patient may have sustained muscle strain since the pain is worse when she sits or stands. We discussed taking ibuprofen as needed for the pain as well as moist heat and stretching. She understood to call her PCP 1st thing in the morning for follow-up appointment or return for worsening symptoms. Counseling:   I have spoken with the patient and discussed todays results, in addition to providing specific details for the plan of care and counseling regarding the diagnosis and prognosis and are agreeable with the plan. Assessment      1. Abdominal pain, right lower quadrant       Plan   Discharge to home. Patient condition is stable. New Medications     New Prescriptions    IBUPROFEN (ADVIL;MOTRIN) 600 MG TABLET    Take 1 tablet by mouth every 6 hours as needed for Pain     Electronically signed by Jean Pierre Cole PA-C   DD: 5/29/19  **This report was transcribed using voice recognition software. Every effort was made to ensure accuracy; however, inadvertent computerized transcription errors may be present.   END OF PROVIDER NOTE        Caesar Fontenot PA-C  05/29/19 0717

## 2019-05-31 ENCOUNTER — CARE COORDINATION (OUTPATIENT)
Dept: CASE MANAGEMENT | Age: 69
End: 2019-05-31

## 2019-05-31 NOTE — CARE COORDINATION
[facility] Transitions Follow Up Call    2019    Patient: Demetrio Diaz  Patient : 1950   MRN: <[id number]>  Reason for Admission:   Discharge Date: 19 RARS: Readmission Risk Score: 0             Care Transitions Subsequent and Final Call    Subsequent and Final Calls  Care Transitions Interventions  Other Interventions: Follow Up : Unable to reach patient for follow up call for BPCI-A. Voice message left with my contact information for return call. Will re attempt at later time.     Future Appointments   Date Time Provider Prateek Nassar   2019  9:40 AM MD ANSON Barrios [facility]   2019  8:00 AM Ben Cartwright APRN - CNP [facility]       Sriram Powell RN

## 2019-06-17 ENCOUNTER — HOSPITAL ENCOUNTER (OUTPATIENT)
Age: 69
Discharge: HOME | End: 2019-06-19
Payer: MEDICARE

## 2019-06-17 DIAGNOSIS — E11.42 TYPE 2 DIABETES MELLITUS WITH DIABETIC POLYNEUROPATHY, WITH LONG-TERM CURRENT USE OF INSULIN (HCC): ICD-10-CM

## 2019-06-17 DIAGNOSIS — Z79.4 TYPE 2 DIABETES MELLITUS WITH DIABETIC POLYNEUROPATHY, WITH LONG-TERM CURRENT USE OF INSULIN (HCC): ICD-10-CM

## 2019-06-17 LAB
ALBUMIN SERPL-MCNC: 4.4 G/DL (ref 3.5–5.2)
ALP BLD-CCNC: 86 U/L (ref 35–104)
ALT SERPL-CCNC: <5 U/L (ref 0–32)
ANION GAP SERPL CALCULATED.3IONS-SCNC: 15 MMOL/L (ref 7–16)
AST SERPL-CCNC: 14 U/L (ref 0–31)
BILIRUB SERPL-MCNC: 0.5 MG/DL (ref 0–1.2)
BUN BLDV-MCNC: 22 MG/DL (ref 8–23)
CALCIUM SERPL-MCNC: 9.4 MG/DL (ref 8.6–10.2)
CHLORIDE BLD-SCNC: 99 MMOL/L (ref 98–107)
CHOLESTEROL, TOTAL: 141 MG/DL (ref 0–199)
CO2: 26 MMOL/L (ref 22–29)
CREAT SERPL-MCNC: 0.8 MG/DL (ref 0.5–1)
GFR AFRICAN AMERICAN: >60
GFR NON-AFRICAN AMERICAN: >60 ML/MIN/1.73
GLUCOSE BLD-MCNC: 88 MG/DL (ref 74–99)
HBA1C MFR BLD: 7.1 % (ref 4–5.6)
HDLC SERPL-MCNC: 49 MG/DL
LDL CHOLESTEROL CALCULATED: 69 MG/DL (ref 0–99)
POTASSIUM SERPL-SCNC: 4.7 MMOL/L (ref 3.5–5)
SODIUM BLD-SCNC: 140 MMOL/L (ref 132–146)
TOTAL PROTEIN: 7.5 G/DL (ref 6.4–8.3)
TRIGL SERPL-MCNC: 116 MG/DL (ref 0–149)
VLDLC SERPL CALC-MCNC: 23 MG/DL

## 2019-06-17 PROCEDURE — 83036 HEMOGLOBIN GLYCOSYLATED A1C: CPT

## 2019-06-17 PROCEDURE — 80061 LIPID PANEL: CPT

## 2019-06-17 PROCEDURE — 80053 COMPREHEN METABOLIC PANEL: CPT

## 2019-06-19 ENCOUNTER — TELEPHONE (OUTPATIENT)
Dept: NEUROLOGY | Age: 69
End: 2019-06-19

## 2019-06-19 NOTE — TELEPHONE ENCOUNTER
Pt states [facility] called regarding her drivers eval and told pt she is unable to have a driving test at their facility unless her \"vision is 70. \" Pt states her optometrist stated her visual acuity is 60 and his opinion is that she is okay to drive based on that and [place]'s laws. MA advised pt to contact the [facility] and ask for them to retest her if she wishes to return to driving. Pt understood.   Electronically signed by Rizwana Díaz on 6/19/19 at 11:22 AM

## 2019-06-27 ENCOUNTER — TELEPHONE (OUTPATIENT)
Dept: NEUROLOGY | Age: 69
End: 2019-06-27

## 2019-07-01 NOTE — TELEPHONE ENCOUNTER
MA spoke with pt regarding Memo's recommendations and pt stated she has not felt dizzy since discontinuing her PM Requip dosage. Pt feels the AM dosage does help her PD sx so she would like to continue that. Forwarding to [facility] for informational purposes.   Electronically signed by Domingo Blandon on 7/1/19 at 9:19 AM

## 2019-07-03 ENCOUNTER — CARE COORDINATION (OUTPATIENT)
Dept: CASE MANAGEMENT | Age: 69
End: 2019-07-03

## 2019-07-03 NOTE — CARE COORDINATION
[facility] Transitions Follow Up Call    7/3/2019    Patient: Dash Hopson  Patient : 1950   MRN: <[id number]>  Reason for Admission: PNA  Discharge Date: 19 RARS: Readmission Risk Score: 0         Spoke with: Shawn Pierre 124 Transitions Subsequent and Final Call    Subsequent and Final Calls  Do you have any ongoing symptoms?:  No  Have your medications changed?:  No  Do you have any questions related to your medications?:  No  Do you currently have any active services?:  No  Do you have any needs or concerns that I can assist you with?:  No  Care Transitions Interventions  Other Interventions:          States she's doing well. Reports dry cough from allergies. Denies SOB, fevers or fatigue. States she's taking medications as prescribed and denies questions or concerns at this time. Encouraged pt to call if questions or concerns arise.    Follow Up  Future Appointments   Date Time Provider Prateek Nassar   2019 10:00 AM MD ANSON Archibald   2019  8:00 AM RASHARD Alexandra CNP [facility]       Stepan Walton

## 2019-07-09 RX ORDER — ROPINIROLE 0.5 MG/1
0.5 TABLET, FILM COATED ORAL 2 TIMES DAILY
Qty: 180 TABLET | Refills: 3 | Status: SHIPPED | OUTPATIENT
Start: 2019-07-09 | End: 2019-09-13 | Stop reason: SDUPTHER

## 2019-08-24 ENCOUNTER — HOSPITAL ENCOUNTER (EMERGENCY)
Age: 69
Discharge: HOME | End: 2019-08-24
Attending: EMERGENCY MEDICINE
Payer: MEDICARE

## 2019-08-24 ENCOUNTER — APPOINTMENT (OUTPATIENT)
Dept: CT IMAGING | Age: 69
End: 2019-08-24
Payer: MEDICARE

## 2019-08-24 ENCOUNTER — APPOINTMENT (OUTPATIENT)
Dept: GENERAL RADIOLOGY | Age: 69
End: 2019-08-24
Payer: MEDICARE

## 2019-08-24 VITALS
SYSTOLIC BLOOD PRESSURE: 148 MMHG | HEART RATE: 98 BPM | OXYGEN SATURATION: 94 % | RESPIRATION RATE: 16 BRPM | TEMPERATURE: 98.4 F | DIASTOLIC BLOOD PRESSURE: 67 MMHG

## 2019-08-24 DIAGNOSIS — R42 DIZZINESS: Primary | ICD-10-CM

## 2019-08-24 LAB
ANION GAP SERPL CALCULATED.3IONS-SCNC: 9 MMOL/L (ref 7–16)
BASOPHILS ABSOLUTE: 0.05 E9/L (ref 0–0.2)
BASOPHILS RELATIVE PERCENT: 0.7 % (ref 0–2)
BILIRUBIN URINE: NEGATIVE
BLOOD, URINE: NEGATIVE
BUN BLDV-MCNC: 17 MG/DL (ref 8–23)
CALCIUM SERPL-MCNC: 9 MG/DL (ref 8.6–10.2)
CHLORIDE BLD-SCNC: 102 MMOL/L (ref 98–107)
CHP ED QC CHECK: NORMAL
CLARITY: CLEAR
CO2: 31 MMOL/L (ref 22–29)
COLOR: YELLOW
CREAT SERPL-MCNC: 0.7 MG/DL (ref 0.5–1)
EOSINOPHILS ABSOLUTE: 0.12 E9/L (ref 0.05–0.5)
EOSINOPHILS RELATIVE PERCENT: 1.6 % (ref 0–6)
GFR AFRICAN AMERICAN: >60
GFR NON-AFRICAN AMERICAN: >60 ML/MIN/1.73
GLUCOSE BLD-MCNC: 201 MG/DL
GLUCOSE BLD-MCNC: 202 MG/DL (ref 74–99)
GLUCOSE URINE: NEGATIVE MG/DL
HCT VFR BLD CALC: 37.1 % (ref 34–48)
HEMOGLOBIN: 11.5 G/DL (ref 11.5–15.5)
IMMATURE GRANULOCYTES #: 0.06 E9/L
IMMATURE GRANULOCYTES %: 0.8 % (ref 0–5)
KETONES, URINE: NEGATIVE MG/DL
LEUKOCYTE ESTERASE, URINE: NEGATIVE
LYMPHOCYTES ABSOLUTE: 1.82 E9/L (ref 1.5–4)
LYMPHOCYTES RELATIVE PERCENT: 23.7 % (ref 20–42)
MCH RBC QN AUTO: 27 PG (ref 26–35)
MCHC RBC AUTO-ENTMCNC: 31 % (ref 32–34.5)
MCV RBC AUTO: 87.1 FL (ref 80–99.9)
METER GLUCOSE: 201 MG/DL (ref 74–99)
MONOCYTES ABSOLUTE: 0.37 E9/L (ref 0.1–0.95)
MONOCYTES RELATIVE PERCENT: 4.8 % (ref 2–12)
NEUTROPHILS ABSOLUTE: 5.25 E9/L (ref 1.8–7.3)
NEUTROPHILS RELATIVE PERCENT: 68.4 % (ref 43–80)
NITRITE, URINE: NEGATIVE
PDW BLD-RTO: 15 FL (ref 11.5–15)
PH UA: 7 (ref 5–9)
PLATELET # BLD: 194 E9/L (ref 130–450)
PMV BLD AUTO: 9.2 FL (ref 7–12)
POTASSIUM REFLEX MAGNESIUM: 4.1 MMOL/L (ref 3.5–5)
PRO-BNP: 97 PG/ML (ref 0–125)
PROTEIN UA: NEGATIVE MG/DL
RBC # BLD: 4.26 E12/L (ref 3.5–5.5)
SODIUM BLD-SCNC: 142 MMOL/L (ref 132–146)
SPECIFIC GRAVITY UA: <=1.005 (ref 1–1.03)
TROPONIN: <0.01 NG/ML (ref 0–0.03)
UROBILINOGEN, URINE: 0.2 E.U./DL
WBC # BLD: 7.7 E9/L (ref 4.5–11.5)

## 2019-08-24 PROCEDURE — 81003 URINALYSIS AUTO W/O SCOPE: CPT

## 2019-08-24 PROCEDURE — 82962 GLUCOSE BLOOD TEST: CPT

## 2019-08-24 PROCEDURE — 87088 URINE BACTERIA CULTURE: CPT

## 2019-08-24 PROCEDURE — 71046 X-RAY EXAM CHEST 2 VIEWS: CPT

## 2019-08-24 PROCEDURE — 84484 ASSAY OF TROPONIN QUANT: CPT

## 2019-08-24 PROCEDURE — 85025 COMPLETE CBC W/AUTO DIFF WBC: CPT

## 2019-08-24 PROCEDURE — 70450 CT HEAD/BRAIN W/O DYE: CPT

## 2019-08-24 PROCEDURE — 80048 BASIC METABOLIC PNL TOTAL CA: CPT

## 2019-08-24 PROCEDURE — 99284 EMERGENCY DEPT VISIT MOD MDM: CPT

## 2019-08-24 PROCEDURE — 83880 ASSAY OF NATRIURETIC PEPTIDE: CPT

## 2019-08-24 PROCEDURE — 93005 ELECTROCARDIOGRAM TRACING: CPT | Performed by: STUDENT IN AN ORGANIZED HEALTH CARE EDUCATION/TRAINING PROGRAM

## 2019-08-24 ASSESSMENT — ENCOUNTER SYMPTOMS
EYE DISCHARGE: 0
WHEEZING: 0
BACK PAIN: 0
SHORTNESS OF BREATH: 0
NAUSEA: 0
EYE PAIN: 0
VOMITING: 0
SORE THROAT: 0
ABDOMINAL DISTENTION: 0
SINUS PRESSURE: 0
COUGH: 0
EYE REDNESS: 0
DIARRHEA: 0

## 2019-08-24 NOTE — ED PROVIDER NOTES
IMPRESSION:   Negative noncontrast CT study. Specifically, there is no evidence of intracranial hemorrhage or mass   effect, and no calvarial traumatic findings are noted. ------------------------- NURSING NOTES AND VITALS REVIEWED ---------------------------  Date / Time Roomed:  8/24/2019  7:03 PM  ED Bed Assignment:  09/09    The nursing notes within the ED encounter and vital signs as below have been reviewed. BP (!) 148/67   Pulse 98   Temp 98.4 °F (36.9 °C) (Oral)   Resp 16   SpO2 94%   Oxygen Saturation Interpretation: Normal      ------------------------------------------ PROGRESS NOTES ------------------------------------------  I have spoken with the patient and discussed todays results, in addition to providing specific details for the plan of care and counseling regarding the diagnosis and prognosis. Their questions are answered at this time and they are agreeable with the plan. I discussed at length with them reasons for immediate return here for re evaluation. They will followup with primary care by calling their office tomorrow. --------------------------------- ADDITIONAL PROVIDER NOTES ---------------------------------  At this time the patient is without objective evidence of an acute process requiring hospitalization or inpatient management. They have remained hemodynamically stable throughout their entire ED visit and are stable for discharge with outpatient follow-up. The plan has been discussed in detail and they are aware of the specific conditions for emergent return, as well as the importance of follow-up. New Prescriptions    No medications on file       Diagnosis:  1. Dizziness        Disposition:  Patient's disposition: Discharge to home  Patient's condition is stable.             Mariposa Melchor DO  Resident  08/25/19 5887

## 2019-08-25 LAB
EKG ATRIAL RATE: 88 BPM
EKG P AXIS: 58 DEGREES
EKG P-R INTERVAL: 176 MS
EKG Q-T INTERVAL: 394 MS
EKG QRS DURATION: 84 MS
EKG QTC CALCULATION (BAZETT): 476 MS
EKG R AXIS: 63 DEGREES
EKG T AXIS: 55 DEGREES
EKG VENTRICULAR RATE: 88 BPM

## 2019-08-25 PROCEDURE — 93010 ELECTROCARDIOGRAM REPORT: CPT | Performed by: INTERNAL MEDICINE

## 2019-08-25 ASSESSMENT — ENCOUNTER SYMPTOMS: ABDOMINAL PAIN: 0

## 2019-08-26 ENCOUNTER — TELEPHONE (OUTPATIENT)
Dept: NEUROLOGY | Age: 69
End: 2019-08-26

## 2019-08-26 LAB — URINE CULTURE, ROUTINE: NORMAL

## 2019-08-26 NOTE — TELEPHONE ENCOUNTER
Pt called back stating she went to an appt today and was barely able to stand bc her tremor was so severe. MA reiterated previous recommendation from Jan Arndt but pt would like to know if there is something she can take or if Requip can be increased.   Electronically signed by Candy Peralta on 8/26/19 at 11:26 AM

## 2019-09-04 ENCOUNTER — HOSPITAL ENCOUNTER (OUTPATIENT)
Age: 69
Discharge: HOME | End: 2019-09-06
Payer: MEDICARE

## 2019-09-04 DIAGNOSIS — R10.30 LOWER ABDOMINAL PAIN: ICD-10-CM

## 2019-09-04 LAB
ALBUMIN SERPL-MCNC: 4.2 G/DL (ref 3.5–5.2)
ALP BLD-CCNC: 88 U/L (ref 35–104)
ALT SERPL-CCNC: <5 U/L (ref 0–32)
ANION GAP SERPL CALCULATED.3IONS-SCNC: 13 MMOL/L (ref 7–16)
AST SERPL-CCNC: 16 U/L (ref 0–31)
BASOPHILS ABSOLUTE: 0.05 E9/L (ref 0–0.2)
BASOPHILS RELATIVE PERCENT: 0.6 % (ref 0–2)
BILIRUB SERPL-MCNC: 0.4 MG/DL (ref 0–1.2)
BUN BLDV-MCNC: 13 MG/DL (ref 8–23)
CALCIUM SERPL-MCNC: 9.3 MG/DL (ref 8.6–10.2)
CHLORIDE BLD-SCNC: 103 MMOL/L (ref 98–107)
CO2: 29 MMOL/L (ref 22–29)
CREAT SERPL-MCNC: 0.8 MG/DL (ref 0.5–1)
EOSINOPHILS ABSOLUTE: 0.09 E9/L (ref 0.05–0.5)
EOSINOPHILS RELATIVE PERCENT: 1.1 % (ref 0–6)
GFR AFRICAN AMERICAN: >60
GFR NON-AFRICAN AMERICAN: >60 ML/MIN/1.73
GLUCOSE BLD-MCNC: 125 MG/DL (ref 74–99)
HCT VFR BLD CALC: 39.3 % (ref 34–48)
HEMOGLOBIN: 12 G/DL (ref 11.5–15.5)
IMMATURE GRANULOCYTES #: 0.05 E9/L
IMMATURE GRANULOCYTES %: 0.6 % (ref 0–5)
LYMPHOCYTES ABSOLUTE: 1.44 E9/L (ref 1.5–4)
LYMPHOCYTES RELATIVE PERCENT: 17.9 % (ref 20–42)
MCH RBC QN AUTO: 26.9 PG (ref 26–35)
MCHC RBC AUTO-ENTMCNC: 30.5 % (ref 32–34.5)
MCV RBC AUTO: 88.1 FL (ref 80–99.9)
MONOCYTES ABSOLUTE: 0.42 E9/L (ref 0.1–0.95)
MONOCYTES RELATIVE PERCENT: 5.2 % (ref 2–12)
NEUTROPHILS ABSOLUTE: 6.01 E9/L (ref 1.8–7.3)
NEUTROPHILS RELATIVE PERCENT: 74.6 % (ref 43–80)
PDW BLD-RTO: 14.9 FL (ref 11.5–15)
PLATELET # BLD: 218 E9/L (ref 130–450)
PMV BLD AUTO: 10 FL (ref 7–12)
POTASSIUM SERPL-SCNC: 4.7 MMOL/L (ref 3.5–5)
RBC # BLD: 4.46 E12/L (ref 3.5–5.5)
SODIUM BLD-SCNC: 145 MMOL/L (ref 132–146)
TOTAL PROTEIN: 6.8 G/DL (ref 6.4–8.3)
WBC # BLD: 8.1 E9/L (ref 4.5–11.5)

## 2019-09-04 PROCEDURE — 85025 COMPLETE CBC W/AUTO DIFF WBC: CPT

## 2019-09-04 PROCEDURE — 80053 COMPREHEN METABOLIC PANEL: CPT

## 2019-09-13 ENCOUNTER — OFFICE VISIT (OUTPATIENT)
Dept: NEUROLOGY | Age: 69
End: 2019-09-13
Payer: MEDICARE

## 2019-09-13 VITALS
OXYGEN SATURATION: 93 % | BODY MASS INDEX: 44.17 KG/M2 | RESPIRATION RATE: 12 BRPM | HEART RATE: 87 BPM | SYSTOLIC BLOOD PRESSURE: 148 MMHG | WEIGHT: 225 LBS | HEIGHT: 60 IN | DIASTOLIC BLOOD PRESSURE: 81 MMHG

## 2019-09-13 DIAGNOSIS — G20 PARKINSON DISEASE (HCC): ICD-10-CM

## 2019-09-13 PROCEDURE — 99214 OFFICE O/P EST MOD 30 MIN: CPT | Performed by: NURSE PRACTITIONER

## 2019-09-13 RX ORDER — CARBIDOPA AND LEVODOPA 50; 200 MG/1; MG/1
1 TABLET, EXTENDED RELEASE ORAL 3 TIMES DAILY
Qty: 270 TABLET | Refills: 3 | Status: SHIPPED
Start: 2019-09-13 | End: 2020-12-28 | Stop reason: SDUPTHER

## 2019-09-13 RX ORDER — ROPINIROLE 0.5 MG/1
0.5 TABLET, FILM COATED ORAL 3 TIMES DAILY
Qty: 270 TABLET | Refills: 3 | Status: SHIPPED | OUTPATIENT
Start: 2019-09-13 | End: 2019-10-22 | Stop reason: DRUGHIGH

## 2019-09-13 NOTE — PROGRESS NOTES
[address] [facility]. Holly Buerger., M.D., F.A.C.P. Michelle Cueva, MONY, APRN, CNS  Nivia Tineo. Jeana Diaz, MSN, APRN-FNP-C  Cyndi Call MSN, APRN, FNP-C  Karrie CASANOVA, PA-C  [address] MSN, APRN, FNP-C  [address], [address]  Phone: [phone]  Fax: [phone]       Eliza Rosenberg is a 70 y.o. right handed woman. We are following her for Parkinson's disease    She is accompanied by her son today and remains a good historian    She feels her L hand tremor has worsened since her last visit--particularly in the afternoon. Requip was decreased to AM dosing only via telephone encounter due to reports of dizziness but she never decreased it and does not remember calling. She has been taking 0.5 mg BID atop Sinemet CR 50/200 TID. No mood, memory, or sleeping issues. Good appetite. Failed Neupro due to skin reaction    No freezing spells, falls, or bradykinesias. She did not pass the eye exam to take her driving test and is upset. Still not formally exercising and has lost no weight. She states it is hard to walk up steps, but not down. She has not been wearing her CPAP for her SERENA.  Experiencing some mild vocal hoarseness    She continues to follow with psych--is on Pristiq and Lamictal. DM is well controlled with A1C 6.8 in March    No chest pain or palpitations  No SOB  No vertigo, lightheadedness or loss of consciousness  No incontinence of bowels or bladder  No itching or bruising appreciated  No numbness, tingling or focal arm/leg weakness    ROS otherwise negative     Medications:     Current Outpatient Medications   Medication Sig Dispense Refill    lamoTRIgine (LAMICTAL) 100 MG tablet Take 100 mg by mouth daily      esomeprazole (NEXIUM) 40 MG delayed release capsule Take 1 capsule by mouth every morning (before breakfast) 30 capsule 5    insulin aspart (NOVOLOG FLEXPEN) 100 UNIT/ML injection pen INJECT 0-10 UNITS INTO THE

## 2019-09-13 NOTE — PATIENT INSTRUCTIONS
Patient Education          Parkinson's disease support group  [facility]  2nd Thurs month at 2pm  [phone]        Exercise for Parkinson's    Delay the [address] [place] [address] [address]  P:[phone]  F:[phone]  [email or web address] [address] [place] [address] for CHEQROOM  [address]  [phone]            Parkinson's Disease: Care Instructions  Your Care Instructions  Parkinson's disease can cause tremors, stiffness, and problems with movement. Severe or advanced cases can also cause problems with thinking. In Parkinson's disease, part of the brain cannot make enough dopamine, a chemical that helps control movement. Taking your medicines correctly and getting regular exercise may help you maintain your quality of life. There are many things that can cause Parkinson's disease symptoms, including some medicine, some toxins, and trauma to the head. The cause in most cases is not known. Follow-up care is a key part of your treatment and safety. Be sure to make and go to all appointments, and call your doctor if you are having problems. It's also a good idea to know your test results and keep a list of the medicines you take. How can you care for yourself at home? General care  · Take your medicines exactly as prescribed. Call your doctor if you think you are having a problem with your medicine. · Make sure your home is safe:  ? Place furniture so that you have something to hold on to as you walk around the house. ? Use chairs that make it easier to sit down and stand up. ? Group the things you use most, such as reading glasses, keys, and the telephone, in one easy-to-reach place. ? Tack down rugs so that you do not trip. ? Put no-slip tape and handrails in the tub to prevent falls. · Use a cane, walker, or scooter if your doctor suggests it.   · Keep up your normal

## 2019-09-16 ENCOUNTER — TELEPHONE (OUTPATIENT)
Dept: NEUROLOGY | Age: 69
End: 2019-09-16

## 2019-09-16 DIAGNOSIS — G20 PARKINSON DISEASE (HCC): Primary | ICD-10-CM

## 2019-09-17 ENCOUNTER — HOSPITAL ENCOUNTER (OUTPATIENT)
Age: 69
Discharge: HOME | End: 2019-09-19
Payer: MEDICARE

## 2019-09-17 DIAGNOSIS — Z79.4 TYPE 2 DIABETES MELLITUS WITH DIABETIC POLYNEUROPATHY, WITH LONG-TERM CURRENT USE OF INSULIN (HCC): ICD-10-CM

## 2019-09-17 DIAGNOSIS — E11.42 TYPE 2 DIABETES MELLITUS WITH DIABETIC POLYNEUROPATHY, WITH LONG-TERM CURRENT USE OF INSULIN (HCC): ICD-10-CM

## 2019-09-17 LAB
ALBUMIN SERPL-MCNC: 4.2 G/DL (ref 3.5–5.2)
ALP BLD-CCNC: 70 U/L (ref 35–104)
ALT SERPL-CCNC: <5 U/L (ref 0–32)
ANION GAP SERPL CALCULATED.3IONS-SCNC: 11 MMOL/L (ref 7–16)
AST SERPL-CCNC: 18 U/L (ref 0–31)
BILIRUB SERPL-MCNC: 0.4 MG/DL (ref 0–1.2)
BUN BLDV-MCNC: 16 MG/DL (ref 8–23)
CALCIUM SERPL-MCNC: 9.2 MG/DL (ref 8.6–10.2)
CHLORIDE BLD-SCNC: 101 MMOL/L (ref 98–107)
CHOLESTEROL, TOTAL: 158 MG/DL (ref 0–199)
CO2: 31 MMOL/L (ref 22–29)
CREAT SERPL-MCNC: 0.9 MG/DL (ref 0.5–1)
GFR AFRICAN AMERICAN: >60
GFR NON-AFRICAN AMERICAN: >60 ML/MIN/1.73
GLUCOSE BLD-MCNC: 96 MG/DL (ref 74–99)
HBA1C MFR BLD: 6.1 % (ref 4–5.6)
HDLC SERPL-MCNC: 48 MG/DL
LDL CHOLESTEROL CALCULATED: 83 MG/DL (ref 0–99)
POTASSIUM SERPL-SCNC: 4.5 MMOL/L (ref 3.5–5)
SODIUM BLD-SCNC: 143 MMOL/L (ref 132–146)
TOTAL PROTEIN: 6.7 G/DL (ref 6.4–8.3)
TRIGL SERPL-MCNC: 135 MG/DL (ref 0–149)
VLDLC SERPL CALC-MCNC: 27 MG/DL

## 2019-09-17 PROCEDURE — 83036 HEMOGLOBIN GLYCOSYLATED A1C: CPT

## 2019-09-17 PROCEDURE — 80061 LIPID PANEL: CPT

## 2019-09-17 PROCEDURE — 80053 COMPREHEN METABOLIC PANEL: CPT

## 2019-09-25 ENCOUNTER — HOSPITAL ENCOUNTER (OUTPATIENT)
Dept: ULTRASOUND IMAGING | Age: 69
Discharge: HOME | End: 2019-09-27
Payer: MEDICARE

## 2019-09-25 DIAGNOSIS — R60.0 BILATERAL LOWER EXTREMITY EDEMA: ICD-10-CM

## 2019-09-25 PROCEDURE — 93970 EXTREMITY STUDY: CPT

## 2019-10-02 ENCOUNTER — TELEPHONE (OUTPATIENT)
Dept: NEUROLOGY | Age: 69
End: 2019-10-02

## 2019-10-03 ENCOUNTER — HOSPITAL ENCOUNTER (OUTPATIENT)
Dept: AUDIOLOGY | Age: 69
Discharge: HOME | End: 2019-10-03

## 2019-10-08 ENCOUNTER — HOSPITAL ENCOUNTER (OUTPATIENT)
Dept: AUDIOLOGY | Age: 69
Discharge: HOME | End: 2019-10-08

## 2019-10-21 ENCOUNTER — TELEPHONE (OUTPATIENT)
Dept: NEUROLOGY | Age: 69
End: 2019-10-21

## 2019-10-22 RX ORDER — ROPINIROLE 0.5 MG/1
0.5 TABLET, FILM COATED ORAL 2 TIMES DAILY
Qty: 270 TABLET | Refills: 3 | Status: SHIPPED
Start: 2019-10-22 | End: 2020-01-03 | Stop reason: DRUGHIGH

## 2019-12-10 ENCOUNTER — HOSPITAL ENCOUNTER (OUTPATIENT)
Age: 69
Discharge: HOME | End: 2019-12-12
Payer: MEDICARE

## 2019-12-10 DIAGNOSIS — E11.42 TYPE 2 DIABETES MELLITUS WITH DIABETIC POLYNEUROPATHY, WITH LONG-TERM CURRENT USE OF INSULIN (HCC): ICD-10-CM

## 2019-12-10 DIAGNOSIS — Z79.4 TYPE 2 DIABETES MELLITUS WITH DIABETIC POLYNEUROPATHY, WITH LONG-TERM CURRENT USE OF INSULIN (HCC): ICD-10-CM

## 2019-12-10 PROBLEM — I10 ESSENTIAL HYPERTENSION: Status: ACTIVE | Noted: 2019-12-10

## 2019-12-10 PROBLEM — R80.9 MICROALBUMINURIA: Status: ACTIVE | Noted: 2019-12-10

## 2019-12-10 PROBLEM — E66.01 CLASS 3 SEVERE OBESITY DUE TO EXCESS CALORIES WITH SERIOUS COMORBIDITY AND BODY MASS INDEX (BMI) OF 40.0 TO 44.9 IN ADULT (HCC): Status: ACTIVE | Noted: 2019-12-10

## 2019-12-10 PROCEDURE — 83036 HEMOGLOBIN GLYCOSYLATED A1C: CPT

## 2019-12-10 PROCEDURE — 80061 LIPID PANEL: CPT

## 2019-12-10 PROCEDURE — 80053 COMPREHEN METABOLIC PANEL: CPT

## 2019-12-11 LAB
ALBUMIN SERPL-MCNC: 3.9 G/DL (ref 3.5–5.2)
ALP BLD-CCNC: 87 U/L (ref 35–104)
ALT SERPL-CCNC: <5 U/L (ref 0–32)
ANION GAP SERPL CALCULATED.3IONS-SCNC: 13 MMOL/L (ref 7–16)
AST SERPL-CCNC: 8 U/L (ref 0–31)
BILIRUB SERPL-MCNC: 0.3 MG/DL (ref 0–1.2)
BUN BLDV-MCNC: 15 MG/DL (ref 8–23)
CALCIUM SERPL-MCNC: 8.9 MG/DL (ref 8.6–10.2)
CHLORIDE BLD-SCNC: 99 MMOL/L (ref 98–107)
CHOLESTEROL, TOTAL: 117 MG/DL (ref 0–199)
CO2: 27 MMOL/L (ref 22–29)
CREAT SERPL-MCNC: 0.8 MG/DL (ref 0.5–1)
GFR AFRICAN AMERICAN: >60
GFR NON-AFRICAN AMERICAN: >60 ML/MIN/1.73
GLUCOSE BLD-MCNC: 73 MG/DL (ref 74–99)
HBA1C MFR BLD: 6.5 % (ref 4–5.6)
HDLC SERPL-MCNC: 44 MG/DL
LDL CHOLESTEROL CALCULATED: 49 MG/DL (ref 0–99)
POTASSIUM SERPL-SCNC: 4.3 MMOL/L (ref 3.5–5)
SODIUM BLD-SCNC: 139 MMOL/L (ref 132–146)
TOTAL PROTEIN: 6.4 G/DL (ref 6.4–8.3)
TRIGL SERPL-MCNC: 120 MG/DL (ref 0–149)
VLDLC SERPL CALC-MCNC: 24 MG/DL

## 2019-12-26 ENCOUNTER — APPOINTMENT (OUTPATIENT)
Dept: GENERAL RADIOLOGY | Age: 69
End: 2019-12-26
Payer: MEDICARE

## 2019-12-26 ENCOUNTER — HOSPITAL ENCOUNTER (EMERGENCY)
Age: 69
Discharge: HOME | End: 2019-12-26
Payer: MEDICARE

## 2019-12-26 VITALS
DIASTOLIC BLOOD PRESSURE: 79 MMHG | HEART RATE: 80 BPM | RESPIRATION RATE: 18 BRPM | SYSTOLIC BLOOD PRESSURE: 145 MMHG | OXYGEN SATURATION: 98 % | TEMPERATURE: 98.3 F

## 2019-12-26 DIAGNOSIS — M17.12 OSTEOARTHRITIS OF LEFT KNEE, UNSPECIFIED OSTEOARTHRITIS TYPE: ICD-10-CM

## 2019-12-26 DIAGNOSIS — S83.92XA SPRAIN OF LEFT KNEE, UNSPECIFIED LIGAMENT, INITIAL ENCOUNTER: ICD-10-CM

## 2019-12-26 DIAGNOSIS — M25.562 ACUTE PAIN OF LEFT KNEE: Primary | ICD-10-CM

## 2019-12-26 DIAGNOSIS — M25.462 KNEE EFFUSION, LEFT: ICD-10-CM

## 2019-12-26 PROCEDURE — 73552 X-RAY EXAM OF FEMUR 2/>: CPT

## 2019-12-26 PROCEDURE — 99283 EMERGENCY DEPT VISIT LOW MDM: CPT

## 2019-12-26 PROCEDURE — 73590 X-RAY EXAM OF LOWER LEG: CPT

## 2019-12-26 PROCEDURE — 73562 X-RAY EXAM OF KNEE 3: CPT

## 2019-12-26 ASSESSMENT — PAIN DESCRIPTION - FREQUENCY: FREQUENCY: CONTINUOUS

## 2019-12-26 ASSESSMENT — PAIN DESCRIPTION - ORIENTATION: ORIENTATION: LEFT

## 2019-12-26 ASSESSMENT — PAIN DESCRIPTION - LOCATION: LOCATION: KNEE

## 2019-12-26 ASSESSMENT — PAIN DESCRIPTION - DESCRIPTORS: DESCRIPTORS: ACHING

## 2019-12-26 ASSESSMENT — PAIN DESCRIPTION - PROGRESSION: CLINICAL_PROGRESSION: NOT CHANGED

## 2019-12-26 ASSESSMENT — PAIN SCALES - GENERAL: PAINLEVEL_OUTOF10: 9

## 2019-12-26 ASSESSMENT — PAIN DESCRIPTION - PAIN TYPE: TYPE: ACUTE PAIN

## 2020-01-03 ENCOUNTER — TELEPHONE (OUTPATIENT)
Dept: NEUROLOGY | Age: 70
End: 2020-01-03

## 2020-01-03 RX ORDER — ROPINIROLE 0.5 MG/1
0.5 TABLET, FILM COATED ORAL 3 TIMES DAILY
Qty: 270 TABLET | Refills: 3 | Status: SHIPPED | OUTPATIENT
Start: 2020-01-03 | End: 2020-12-28 | Stop reason: SDUPTHER

## 2020-01-03 RX ORDER — ROPINIROLE 0.5 MG/1
0.5 TABLET, FILM COATED ORAL 3 TIMES DAILY
Qty: 270 TABLET | Refills: 3 | Status: SHIPPED
Start: 2020-01-03 | End: 2020-01-03 | Stop reason: SDUPTHER

## 2020-01-03 NOTE — TELEPHONE ENCOUNTER
Needs refill to accommodate the increase of Requip. Per her pharmacy they did not receive the order to adjust signature. Patient has moved so I changed pharmacy in patient's chart.

## 2020-01-09 ENCOUNTER — APPOINTMENT (OUTPATIENT)
Dept: GENERAL RADIOLOGY | Age: 70
End: 2020-01-09
Payer: MEDICARE

## 2020-01-09 ENCOUNTER — HOSPITAL ENCOUNTER (EMERGENCY)
Age: 70
Discharge: HOME | End: 2020-01-10
Attending: EMERGENCY MEDICINE
Payer: MEDICARE

## 2020-01-09 PROCEDURE — 71045 X-RAY EXAM CHEST 1 VIEW: CPT

## 2020-01-09 PROCEDURE — 6370000000 HC RX 637 (ALT 250 FOR IP): Performed by: EMERGENCY MEDICINE

## 2020-01-09 PROCEDURE — 94664 DEMO&/EVAL PT USE INHALER: CPT

## 2020-01-09 PROCEDURE — 99284 EMERGENCY DEPT VISIT MOD MDM: CPT

## 2020-01-09 RX ORDER — IPRATROPIUM BROMIDE AND ALBUTEROL SULFATE 2.5; .5 MG/3ML; MG/3ML
1 SOLUTION RESPIRATORY (INHALATION) ONCE
Status: COMPLETED | OUTPATIENT
Start: 2020-01-09 | End: 2020-01-09

## 2020-01-09 RX ORDER — PREDNISONE 20 MG/1
60 TABLET ORAL ONCE
Status: COMPLETED | OUTPATIENT
Start: 2020-01-09 | End: 2020-01-09

## 2020-01-09 RX ADMIN — IPRATROPIUM BROMIDE AND ALBUTEROL SULFATE 1 AMPULE: .5; 3 SOLUTION RESPIRATORY (INHALATION) at 23:13

## 2020-01-09 RX ADMIN — PREDNISONE 60 MG: 20 TABLET ORAL at 23:05

## 2020-01-10 VITALS
DIASTOLIC BLOOD PRESSURE: 67 MMHG | SYSTOLIC BLOOD PRESSURE: 149 MMHG | OXYGEN SATURATION: 98 % | RESPIRATION RATE: 18 BRPM | HEART RATE: 91 BPM | TEMPERATURE: 97.8 F

## 2020-01-10 RX ORDER — ALBUTEROL SULFATE 90 UG/1
2 AEROSOL, METERED RESPIRATORY (INHALATION) EVERY 4 HOURS PRN
Qty: 1 INHALER | Refills: 3 | Status: SHIPPED | OUTPATIENT
Start: 2020-01-10 | End: 2021-01-13

## 2020-01-10 NOTE — ED PROVIDER NOTES
HPI:  1/9/20, Time: 10:42 PM         Darrell Harper is a 78 y.o. female presenting to the ED for shortness of breath, beginning 2 hours ago. The complaint has been persistent, moderate in severity, and worsened by light exertion. Patient states that she was performing her exercises for Parkinson's disease earlier this evening, did so without complication. However she states approximately 2 hours ago she began to feel short of breath. Patient felt that her asthma was flaring, no relief with her inhaler. This prompted ED evaluation. She states that she has had a cough recently, denies any recent dyspnea on exertion or orthopnea. No recent fevers. She denies any chest pain or discomfort. Review of Systems:   Pertinent positives and negatives are stated within HPI, all other systems reviewed and are negative.          --------------------------------------------- PAST HISTORY ---------------------------------------------  Past Medical History:  has a past medical history of Anxiety, Asthma, CAD (coronary artery disease), Cancer ([facility] [address] Chronic kidney disease, Depression, [facility] H/O mammogram, Hx MRSA infection, Hyperlipidemia, Hypertension, Lateral epicondylitis, SERENA on CPAP, and Tubal ligation status. Past Surgical History:  has a past surgical history that includes Gallbladder surgery; Breast lumpectomy; Breast reduction surgery; Toe amputation; Cholecystectomy; Cardiac catheterization (4/28/2014); Colonoscopy (7/29/15); Endoscopy, colon, diagnostic (7/19/15); Upper gastrointestinal endoscopy; lumbar laminectomy; and Tonsillectomy. Social History:  reports that she has never smoked. She has never used smokeless tobacco. She reports that she does not drink alcohol or use drugs. Family History: family history includes Cancer in her father and mother; Diabetes in her sister; Heart Disease in her sister; Other in her brother; Seizures in her son.      The patients home medications have been reviewed. Allergies: Ciprofloxacin and Codeine    -------------------------------------------------- RESULTS -------------------------------------------------  All laboratory and radiology results have been personally reviewed by myself   LABS:  No results found for this visit on 20. RADIOLOGY:  Interpreted by Radiologist.  XR CHEST PORTABLE   Final Result   No acute cardiopulmonary process. ------------------------- NURSING NOTES AND VITALS REVIEWED ---------------------------   The nursing notes within the ED encounter and vital signs as below have been reviewed. BP (!) 149/67   Pulse 91   Temp 97.8 °F (36.6 °C) (Oral)   Resp 18   SpO2 98%   Oxygen Saturation Interpretation: Normal      ---------------------------------------------------PHYSICAL EXAM--------------------------------------      Constitutional/General: Alert and oriented x3, well appearing, non toxic in NAD  Head: Normocephalic and atraumatic  Eyes: PERRL, EOMI  Mouth: Oropharynx clear, handling secretions, no trismus  Neck: Supple, full ROM,   Pulmonary: Expiratory wheezes with diminished bases. Not in respiratory distress  Cardiovascular:  Regular rate and rhythm, no murmurs, gallops, or rubs. 2+ distal pulses  Abdomen: Soft, non tender, non distended,   Extremities: Moves all extremities x 4. Warm and well perfused  Skin: warm and dry without rash  Neurologic: GCS 15,  Psych: Normal Affect      ------------------------------ ED COURSE/MEDICAL DECISION MAKING----------------------  Medications   ipratropium-albuterol (DUONEB) nebulizer solution 1 ampule (1 ampule Inhalation Given 20 8517)   predniSONE (DELTASONE) tablet 60 mg (60 mg Oral Given 20)         ED COURSE:       Medical Decision Makin-year-old female with history of asthma presenting with shortness of breath. She got no relief with albuterol inhaler.   She arrives with a slight increased work of breathing, expiratory wheezes with a prolonged expiratory phase. She denies any chest pain or discomfort. Patient was given DuoNeb and prednisone. Chest x-ray shows no acute abnormalities. After 1 DuoNeb and steroid she is feeling significantly better. Reassessment shows resolution of her wheezing. She is ambulating without difficulty. She is comfortable at discharge home. She has an Advair inhaler but does not have any rescue inhaler. She will be given albuterol inhaler. She has agreed to PCP follow-up with instruction to return for any changes in condition or new symptoms. Counseling: The emergency provider has spoken with the patient and discussed todays results, in addition to providing specific details for the plan of care and counseling regarding the diagnosis and prognosis. Questions are answered at this time and they are agreeable with the plan.      --------------------------------- IMPRESSION AND DISPOSITION ---------------------------------    IMPRESSION  1. Mild asthma with exacerbation, unspecified whether persistent        DISPOSITION  Disposition: Discharge to home  Patient condition is stable      NOTE: This report was transcribed using voice recognition software.  Every effort was made to ensure accuracy; however, inadvertent computerized transcription errors may be present        Oly Wan DO  01/10/20 0021

## 2020-01-27 ENCOUNTER — OFFICE VISIT (OUTPATIENT)
Dept: NEUROLOGY | Age: 70
End: 2020-01-27
Payer: MEDICARE

## 2020-01-27 VITALS
OXYGEN SATURATION: 92 % | RESPIRATION RATE: 12 BRPM | DIASTOLIC BLOOD PRESSURE: 83 MMHG | HEIGHT: 60 IN | HEART RATE: 87 BPM | WEIGHT: 223 LBS | SYSTOLIC BLOOD PRESSURE: 159 MMHG | BODY MASS INDEX: 43.78 KG/M2

## 2020-01-27 PROCEDURE — 99214 OFFICE O/P EST MOD 30 MIN: CPT | Performed by: NURSE PRACTITIONER

## 2020-01-27 NOTE — PATIENT INSTRUCTIONS
Patient Education        Parkinson's Disease: Care Instructions  Your Care Instructions  Parkinson's disease can cause tremors, stiffness, and problems with movement. Severe or advanced cases can also cause problems with thinking. In Parkinson's disease, part of the brain cannot make enough dopamine, a chemical that helps control movement. Taking your medicines correctly and getting regular exercise may help you maintain your quality of life. There are many things that can cause Parkinson's disease symptoms, including some medicine, some toxins, and trauma to the head. The cause in most cases is not known. Follow-up care is a key part of your treatment and safety. Be sure to make and go to all appointments, and call your doctor if you are having problems. It's also a good idea to know your test results and keep a list of the medicines you take. How can you care for yourself at home? General care  · Take your medicines exactly as prescribed. Call your doctor if you think you are having a problem with your medicine. · Make sure your home is safe:  ? Place furniture so that you have something to hold on to as you walk around the house. ? Use chairs that make it easier to sit down and stand up. ? Group the things you use most, such as reading glasses, keys, and the telephone, in one easy-to-reach place. ? Tack down rugs so that you do not trip. ? Put no-slip tape and handrails in the tub to prevent falls. · Use a cane, walker, or scooter if your doctor suggests it. · Keep up your normal activities as much as you can. · Find ways to manage stress, which can make symptoms worse. · Spend time with family and friends. Join a support group for people with Parkinson's disease if you want extra help. · Depression is common with this condition. Tell your doctor if you have trouble sleeping, are eating too much or are not hungry, or feel sad or tearful all the time.  Depression can be treated with medicine and

## 2020-01-27 NOTE — PROGRESS NOTES
WEEKS AFTER SURGERY      fluticasone-salmeterol (ADVAIR HFA) 115-21 MCG/ACT inhaler Inhale 2 puffs into the lungs 2 times daily      albuterol (PROVENTIL) (2.5 MG/3ML) 0.083% nebulizer solution Take 3 mLs by nebulization every 6 hours as needed for Wheezing 120 each 3    albuterol sulfate HFA (VENTOLIN HFA) 108 (90 Base) MCG/ACT inhaler Inhale 2 puffs into the lungs every 4 hours as needed for Wheezing 1 Inhaler 3    esomeprazole (NEXIUM) 40 MG delayed release capsule Take 1 capsule by mouth every morning (before breakfast) 30 capsule 3    rOPINIRole (REQUIP) 0.5 MG tablet Take 1 tablet by mouth 3 times daily 270 tablet 3    montelukast (SINGULAIR) 10 MG tablet Take 1 tablet by mouth nightly 90 tablet 0    Blood Glucose Monitoring Suppl (BLOOD GLUCOSE MONITOR SYSTEM) w/Device KIT 1 kit by Does not apply route 2 times daily as needed (PRN) ONE TOUCH, PER INSURANCE COVERAGE 1 kit 0    blood glucose test strips (ASCENSIA AUTODISC VI;ONE TOUCH ULTRA TEST VI) strip 1 each by In Vitro route daily Indications: ONE TOUCH, PER INSURANCE COVERAGE As needed. 100 each 1    lamoTRIgine (LAMICTAL) 150 MG tablet TK 1 T PO QD  0    atorvastatin (LIPITOR) 20 MG tablet Take 1 tablet by mouth daily 90 tablet 3    lisinopril (PRINIVIL;ZESTRIL) 20 MG tablet Take 1 tablet by mouth every morning 30 tablet 3    amLODIPine (NORVASC) 2.5 MG tablet TAKE 1 TABLET BY MOUTH DAILY 90 tablet 3    carbidopa-levodopa (SINEMET CR)  MG per extended release tablet Take 1 tablet by mouth 3 times daily 270 tablet 3    insulin aspart (NOVOLOG FLEXPEN) 100 UNIT/ML injection pen INJECT 0-10 UNITS INTO THE SKIN THREE TIMES DAILY(BEFORE MEALS) SLIDING SCALE (MAX DAILY 30 UNITS) (Patient taking differently: Indications: med.  approved 6/1/19-7/29/20 INJECT 0-10 UNITS INTO THE SKIN THREE TIMES DAILY(BEFORE MEALS) SLIDING SCALE (MAX DAILY 30 UNITS)) 5 pen 3    LANCETS ULTRA FINE MISC 1 each by Does not apply route 2 times daily Indications: bradykinesias today    Sensory:  LT normal    Coordination:   FN, FFM, RAMY  normal    Gait:  No shuffling or festination  Lumbering    DTR:   Right Brachioradialis reflex 1+  Left Brachioradialis reflex 1+  Right Biceps reflex 1+  Left Biceps reflex 1+  Right Triceps reflex 1+  Left Triceps reflex 1+  Right Quadriceps reflex 1+  Left Quadriceps reflex 1+  Right Achilles reflex 1+  Left Achilles reflex 1+    No Kohler's or pathological reflexes    Laboratory/Radiology:  ry/Radiology:     Lab Results   Component Value Date     12/10/2019    K 4.3 12/10/2019    CL 99 12/10/2019    CO2 27 12/10/2019    BUN 15 12/10/2019    CREATININE 0.8 12/10/2019    GLUCOSE 73 (L) 12/10/2019    CALCIUM 8.9 12/10/2019    PROT 6.4 12/10/2019    LABALBU 3.9 12/10/2019    BILITOT 0.3 12/10/2019    ALKPHOS 87 12/10/2019    AST 8 12/10/2019    ALT <5 12/10/2019    LABGLOM >60 12/10/2019    GFRAA >60 12/10/2019       Lab Results   Component Value Date    WBC 8.1 09/04/2019    HGB 12.0 09/04/2019    HCT 39.3 09/04/2019    MCV 88.1 09/04/2019     09/04/2019     Lab Results   Component Value Date    LABA1C 6.5 (H) 12/10/2019     All labs and images were personally reviewed at the time of this visit    Assessment:     The patient suffers from Parkinson's disease   Breakthrough tremors in mid-day--will increase Requip to TID and monitor for dizziness and continue Sinemet   I find no tremors on exam today and no significant non-motor s/s   She must begin a daily exercise program to delay her disease and she was encouraged to use Silver Sneakers    SERENA   Noncompliant with CPAP   Discussed health risks and encouraged compliance    Lifestyle modifications were reviewed including weight loss, healthy diet, sleep hygiene, and stress management.     Plan:     Continue Sinemet CR 50/200 mg TID     Increase Requip 0.5 mg TID    She must start exercising    RTO in 6 months or sooner RASHARD Marquez, NIMOP-C  12:55 PM  1/27/2020 none

## 2020-01-27 NOTE — PROGRESS NOTES
[address] [facility]. Kaci Sanchez M.D., F.A.C.P. Radha Jernigan, MONY, APRN, CNS  [facility]. Carmela Spain, MSN, APRN-FNP-C  Raimundo Barboza MSN, APRN, FNP-C  CHAITANYA [address] MSN, APRN, FNP-C  [address], [facility]  [place], [address] [place]jandra  Phone: [phone]  Fax: [phone]       Gisell Chavez is a 78 y.o. right handed woman. We are following her for Parkinson's disease    She is accompanied by her daughter today and remains a good historian    Requip was again increased to 0.5 mg 3 times daily via telephone call because she felt her tremors worsened when decreasing the medication to twice daily. No further reports of dizziness since increasing. She is now living with her daughter again, and her daughter states that her tremors had increased because of stress while living at another house. The patient also remains on Sinemet CR 50/200 TID. She is doing very well today with no freezing spells, falls, or bradykinesias. Mood or memory changes. No dysphasia or bowel/bladder changes. Failed Neupro due to skin reaction    She was going to [employer] [place] [employer], but has not been back. They are planning to move to [place] in the next few weeks. Her daughter makes her stay active around the house. She is compliant with her CPAP and sleeping well at night. Diabetes is well controlled. No other complaints for me.     No chest pain or palpitations  No SOB  No vertigo, lightheadedness or loss of consciousness  No incontinence of bowels or bladder  No itching or bruising appreciated  No numbness, tingling or focal arm/leg weakness    ROS otherwise negative     Medications:     Current Outpatient Medications   Medication Sig Dispense Refill    insulin glargine (LANTUS SOLOSTAR) 100 UNIT/ML injection pen Inject 80 Units into the skin nightly 5 pen 3    fluticasone-salmeterol (ADVAIR HFA) 115-21 MCG/ACT inhaler Inhale 2 puffs into the lungs 2 times daily      albuterol (PROVENTIL) (2.5 MG/3ML) 0.083% nebulizer solution Take 3 mLs by nebulization every 6 hours as needed for Wheezing 120 each 3    albuterol sulfate HFA (VENTOLIN HFA) 108 (90 Base) MCG/ACT inhaler Inhale 2 puffs into the lungs every 4 hours as needed for Wheezing 1 Inhaler 3    esomeprazole (NEXIUM) 40 MG delayed release capsule Take 1 capsule by mouth every morning (before breakfast) 30 capsule 3    rOPINIRole (REQUIP) 0.5 MG tablet Take 1 tablet by mouth 3 times daily 270 tablet 3    montelukast (SINGULAIR) 10 MG tablet Take 1 tablet by mouth nightly 90 tablet 0    lamoTRIgine (LAMICTAL) 150 MG tablet Take 150 mg by mouth daily   0    atorvastatin (LIPITOR) 20 MG tablet Take 1 tablet by mouth daily 90 tablet 3    lisinopril (PRINIVIL;ZESTRIL) 20 MG tablet Take 1 tablet by mouth every morning 30 tablet 3    amLODIPine (NORVASC) 2.5 MG tablet TAKE 1 TABLET BY MOUTH DAILY 90 tablet 3    carbidopa-levodopa (SINEMET CR)  MG per extended release tablet Take 1 tablet by mouth 3 times daily 270 tablet 3    insulin aspart (NOVOLOG FLEXPEN) 100 UNIT/ML injection pen INJECT 0-10 UNITS INTO THE SKIN THREE TIMES DAILY(BEFORE MEALS) SLIDING SCALE (MAX DAILY 30 UNITS) (Patient taking differently: Indications: med. approved 6/1/19-7/29/20 INJECT 0-10 UNITS INTO THE SKIN THREE TIMES DAILY(BEFORE MEALS) SLIDING SCALE (MAX DAILY 30 UNITS)) 5 pen 3    ALPRAZolam (XANAX) 0.5 MG tablet Take 0.5 mg by mouth as needed. 0    aspirin 81 MG tablet Take 81 mg by mouth nightly      traZODone (DESYREL) 50 MG tablet Take 150 mg by mouth nightly   2    desvenlafaxine (PRISTIQ) 50 MG TB24 Take 150 mg by mouth every morning        No current facility-administered medications for this visit.       Objective:     BP (!) 159/83 (Site: Left Upper Arm, Position: Sitting, Cuff Size: Medium Adult)   Pulse 87   Resp 12   Ht 5' (1.524 m)   Wt 223 lb (101.2 kg)   SpO2 92%   BMI 43.55 kg/m²     General appearance: alert, appears stated age, cooperative and in no distress----mildly restricted facies; obese  Head: normocephalic/atraumatic  Eyes: conjunctivae/corneas clear; no drainage  Neck: no carotid bruit----no cogwheeling  Lungs: clear to auscultation bilaterally  Heart: regular rate and rhythm----no murmur  Extremities: no edema or cyanosis  Pulses: 2+ and symmetric  Skin: stasis dermatitis to BLE; no rashes or lesions    Mental Status: alert and oriented x 4    Appropriate attention/concentration  Intact memories  Intact fundus of knowledge    Speech: no dysarthria  Language: no aphasias    Cranial Nerves:  I: smell    II: visual acuity     II: visual fields Full    II: pupils MANDEEP   III,VII: ptosis None   III,IV,VI: extraocular muscles  EOMI without nystagmus   V: mastication Normal   V: facial light touch sensation  Normal   V,VII: corneal reflex     VII: facial muscle function - upper  Normal   VII: facial muscle function - lower Normal   VIII: hearing Decreased R (wears aid)   IX: soft palate elevation  Normal   IX,X: gag reflex    XI: trapezius strength  5/5   XI: sternocleidomastoid strength 5/5   XI: neck extension strength  5/5   XII: tongue strength  Normal     No Myerson's    Motor:  5/5 throughout  No drift; obese bulk  Very mild intermittent perioral tremor--no protruding tongue movements  No resting tremors, cogwheeling or bradykinesias today    Sensory:  LT normal    Coordination:   FN, FFM, RAMY  normal    Gait:  No shuffling or festination  Lumbering    DTR:   Right Brachioradialis reflex 1+  Left Brachioradialis reflex 1+  Right Biceps reflex 1+  Left Biceps reflex 1+  Right Triceps reflex 1+  Left Triceps reflex 1+  Right Quadriceps reflex 1+  Left Quadriceps reflex 1+  Right Achilles reflex 1+  Left Achilles reflex 1+    No Kohler's or pathological reflexes    Laboratory/Radiology:  ry/Radiology:     Lab Results   Component Value Date     12/10/2019    K 4.3

## 2020-09-12 ENCOUNTER — APPOINTMENT (OUTPATIENT)
Dept: GENERAL RADIOLOGY | Age: 70
End: 2020-09-12
Payer: MEDICARE

## 2020-09-12 ENCOUNTER — HOSPITAL ENCOUNTER (EMERGENCY)
Age: 70
Discharge: HOME | End: 2020-09-12
Attending: EMERGENCY MEDICINE
Payer: MEDICARE

## 2020-09-12 VITALS
WEIGHT: 198 LBS | SYSTOLIC BLOOD PRESSURE: 132 MMHG | OXYGEN SATURATION: 98 % | DIASTOLIC BLOOD PRESSURE: 71 MMHG | HEIGHT: 60 IN | RESPIRATION RATE: 18 BRPM | HEART RATE: 71 BPM | TEMPERATURE: 97.3 F | BODY MASS INDEX: 38.87 KG/M2

## 2020-09-12 PROCEDURE — 73562 X-RAY EXAM OF KNEE 3: CPT

## 2020-09-12 PROCEDURE — 99282 EMERGENCY DEPT VISIT SF MDM: CPT

## 2020-09-12 PROCEDURE — 99283 EMERGENCY DEPT VISIT LOW MDM: CPT

## 2020-09-12 PROCEDURE — 73590 X-RAY EXAM OF LOWER LEG: CPT

## 2020-09-12 PROCEDURE — 73502 X-RAY EXAM HIP UNI 2-3 VIEWS: CPT

## 2020-09-12 ASSESSMENT — ENCOUNTER SYMPTOMS
ABDOMINAL DISTENTION: 0
ABDOMINAL PAIN: 0
DIARRHEA: 0
VOMITING: 0
CHEST TIGHTNESS: 0
WHEEZING: 0
SORE THROAT: 0
NAUSEA: 0
BACK PAIN: 0
SHORTNESS OF BREATH: 0
COUGH: 0

## 2020-09-12 NOTE — ED PROVIDER NOTES
Chief complaint:  Left leg pain      HPI history provided by the patient where  Patient comes in stating that she has a history of possible Parkinson's, has difficulty ambulating and does fall frequently. Several days ago she fell and landed on her left leg and has had left hip and left knee pain since then and has had bruising developing throughout the leg although has no soft tissue pain, pain isolated to the knee and hip. No head injury or headache, no neck or back injury or pain. No other arm or leg pain or injuries. No weakness, numbness, tingling or paresthesias in the extremities. Weightbearing and movement make the leg pain worse. No abdominal pain or chest pain or shortness of breath. No specific treatment prior to arrival.          Review of Systems   Constitutional: Negative for chills, diaphoresis, fatigue and fever. HENT: Negative for congestion and sore throat. Respiratory: Negative for cough, chest tightness, shortness of breath and wheezing. Cardiovascular: Negative for chest pain, palpitations and leg swelling. Gastrointestinal: Negative for abdominal distention, abdominal pain, diarrhea, nausea and vomiting. Genitourinary: Negative for dysuria, flank pain and frequency. Musculoskeletal: Positive for gait problem. Negative for arthralgias, back pain, joint swelling, myalgias, neck pain and neck stiffness. Skin: Negative for rash and wound. Neurological: Negative for dizziness, seizures, syncope, weakness, light-headedness, numbness and headaches. Hematological: Negative for adenopathy. All other systems reviewed and are negative. Physical Exam  Vitals signs and nursing note reviewed. Constitutional:       General: She is not in acute distress. Appearance: She is well-developed. She is not ill-appearing, toxic-appearing or diaphoretic. HENT:      Head: Normocephalic and atraumatic.       Comments: No sign of acute head or face injury  Eyes:      Pupils: Pupils are equal, round, and reactive to light. Neck:      Musculoskeletal: Normal range of motion and neck supple. Normal range of motion. No neck rigidity, injury, pain with movement, spinous process tenderness or muscular tenderness. Trachea: Trachea and phonation normal. No tracheal tenderness or tracheal deviation. Cardiovascular:      Rate and Rhythm: Normal rate and regular rhythm. Heart sounds: Normal heart sounds. No murmur. Pulmonary:      Effort: Pulmonary effort is normal. No respiratory distress. Breath sounds: Normal breath sounds. No stridor, decreased air movement or transmitted upper airway sounds. No decreased breath sounds, wheezing, rhonchi or rales. Chest:      Chest wall: No tenderness. Abdominal:      General: Bowel sounds are normal. There is no distension. Palpations: Abdomen is soft. Tenderness: There is no abdominal tenderness. There is no right CVA tenderness, left CVA tenderness, guarding or rebound. Musculoskeletal:         General: No swelling, tenderness, deformity or signs of injury. Right lower leg: No edema. Left lower leg: No edema. Comments: Bruising along the left leg to the lateral hip, medial thigh and medial calf area, she has no soft tissue tenderness in those areas. She has diffuse left knee tenderness with no effusion or deformity and has general left hip tenderness to palpation and range of motion. Left ankle and foot are unremarkable. She has no gross left leg deformity and is neurovascular intact. The rest of her musculoskeletal exam shows no sign of acute bone or joint injuries in the other extremities. No midline cervical, thoracic or lumbar spine tenderness noted. Lymphadenopathy:      Cervical: No cervical adenopathy. Skin:     General: Skin is warm and dry. Coloration: Skin is not jaundiced or pale. Findings: Bruising present. No erythema or rash.    Neurological:      General: No focal deficit present. Mental Status: She is alert and oriented to person, place, and time. GCS: GCS eye subscore is 4. GCS verbal subscore is 5. GCS motor subscore is 6. Cranial Nerves: Cranial nerves are intact. No cranial nerve deficit. Sensory: Sensation is intact. Motor: Motor function is intact. Coordination: Coordination is intact. Coordination normal.          Procedures     MDM     ED Course as of Sep 12 1223   Sat Sep 12, 2020   1221 Patient states she is doing well, she is updated on work-up results. Repeat exam shows no soft tissue tenderness, no calf pain to the left leg. She has been up and ambulated the bathroom twice on her own with no difficulty. [NC]      ED Course User Index  [NC] Anupama Go       ED Course as of Sep 12 1223   Sat Sep 12, 2020   1221 Patient states she is doing well, she is updated on work-up results. Repeat exam shows no soft tissue tenderness, no calf pain to the left leg. She has been up and ambulated the bathroom twice on her own with no difficulty. [NC]      ED Course User Index  [NC] Joi Dao, DO       --------------------------------------------- PAST HISTORY ---------------------------------------------  Past Medical History:  has a past medical history of Anxiety, Asthma, CAD (coronary artery disease), Cancer ([facility] [address] Chronic kidney disease, Depression, [facility] H/O mammogram, Hx MRSA infection, Hyperlipidemia, Hypertension, Lateral epicondylitis, SERENA on CPAP, and Tubal ligation status. Past Surgical History:  has a past surgical history that includes Gallbladder surgery; Breast lumpectomy; Breast reduction surgery; Toe amputation; Cholecystectomy; Cardiac catheterization (4/28/2014); Colonoscopy (7/29/15); Endoscopy, colon, diagnostic (7/19/15); Upper gastrointestinal endoscopy; lumbar laminectomy; and Tonsillectomy. Social History:  reports that she has never smoked.  She has never used smokeless objective evidence of an acute process requiring hospitalization or inpatient management. They have remained hemodynamically stable throughout their entire ED visit and are stable for discharge with outpatient follow-up. The plan has been discussed in detail and they are aware of the specific conditions for emergent return, as well as the importance of follow-up. New Prescriptions    No medications on file       Diagnosis:  1. Acute pain of left knee    2. Contusion of left lower extremity, initial encounter        Disposition:  Patient's disposition: Discharge to home  Patient's condition is stable.               Sade Hu DO  09/12/20 1221

## 2020-10-26 ENCOUNTER — HOSPITAL ENCOUNTER (EMERGENCY)
Age: 70
Discharge: HOME | End: 2020-10-26
Attending: EMERGENCY MEDICINE
Payer: MEDICARE

## 2020-10-26 ENCOUNTER — APPOINTMENT (OUTPATIENT)
Dept: GENERAL RADIOLOGY | Age: 70
End: 2020-10-26
Payer: MEDICARE

## 2020-10-26 ENCOUNTER — APPOINTMENT (OUTPATIENT)
Dept: CT IMAGING | Age: 70
End: 2020-10-26
Payer: MEDICARE

## 2020-10-26 VITALS
TEMPERATURE: 97.5 F | RESPIRATION RATE: 20 BRPM | BODY MASS INDEX: 38.87 KG/M2 | HEIGHT: 60 IN | OXYGEN SATURATION: 96 % | HEART RATE: 70 BPM | SYSTOLIC BLOOD PRESSURE: 140 MMHG | WEIGHT: 198 LBS | DIASTOLIC BLOOD PRESSURE: 80 MMHG

## 2020-10-26 LAB
ALBUMIN SERPL-MCNC: 3.8 G/DL (ref 3.5–5.2)
ALP BLD-CCNC: 86 U/L (ref 35–104)
ALT SERPL-CCNC: <5 U/L (ref 0–32)
ANION GAP SERPL CALCULATED.3IONS-SCNC: 8 MMOL/L (ref 7–16)
AST SERPL-CCNC: 10 U/L (ref 0–31)
BASOPHILS ABSOLUTE: 0.04 E9/L (ref 0–0.2)
BASOPHILS RELATIVE PERCENT: 0.7 % (ref 0–2)
BILIRUB SERPL-MCNC: 0.3 MG/DL (ref 0–1.2)
BILIRUBIN URINE: NEGATIVE
BLOOD, URINE: NEGATIVE
BUN BLDV-MCNC: 14 MG/DL (ref 8–23)
CALCIUM SERPL-MCNC: 7.7 MG/DL (ref 8.6–10.2)
CHLORIDE BLD-SCNC: 106 MMOL/L (ref 98–107)
CLARITY: CLEAR
CO2: 26 MMOL/L (ref 22–29)
COLOR: YELLOW
CREAT SERPL-MCNC: 0.7 MG/DL (ref 0.5–1)
EOSINOPHILS ABSOLUTE: 0.06 E9/L (ref 0.05–0.5)
EOSINOPHILS RELATIVE PERCENT: 1 % (ref 0–6)
GFR AFRICAN AMERICAN: >60
GFR NON-AFRICAN AMERICAN: >60 ML/MIN/1.73
GLUCOSE BLD-MCNC: 91 MG/DL (ref 74–99)
GLUCOSE URINE: NEGATIVE MG/DL
HCT VFR BLD CALC: 37 % (ref 34–48)
HEMOGLOBIN: 11.3 G/DL (ref 11.5–15.5)
IMMATURE GRANULOCYTES #: 0.01 E9/L
IMMATURE GRANULOCYTES %: 0.2 % (ref 0–5)
KETONES, URINE: NEGATIVE MG/DL
LEUKOCYTE ESTERASE, URINE: NEGATIVE
LIPASE: 17 U/L (ref 13–60)
LYMPHOCYTES ABSOLUTE: 1.25 E9/L (ref 1.5–4)
LYMPHOCYTES RELATIVE PERCENT: 21.6 % (ref 20–42)
MCH RBC QN AUTO: 27.8 PG (ref 26–35)
MCHC RBC AUTO-ENTMCNC: 30.5 % (ref 32–34.5)
MCV RBC AUTO: 90.9 FL (ref 80–99.9)
MONOCYTES ABSOLUTE: 0.24 E9/L (ref 0.1–0.95)
MONOCYTES RELATIVE PERCENT: 4.1 % (ref 2–12)
NEUTROPHILS ABSOLUTE: 4.19 E9/L (ref 1.8–7.3)
NEUTROPHILS RELATIVE PERCENT: 72.4 % (ref 43–80)
NITRITE, URINE: NEGATIVE
PDW BLD-RTO: 14.1 FL (ref 11.5–15)
PH UA: 6 (ref 5–9)
PLATELET # BLD: 180 E9/L (ref 130–450)
PMV BLD AUTO: 10.2 FL (ref 7–12)
POTASSIUM REFLEX MAGNESIUM: 3.9 MMOL/L (ref 3.5–5)
PRO-BNP: 197 PG/ML (ref 0–125)
PROTEIN UA: NEGATIVE MG/DL
RBC # BLD: 4.07 E12/L (ref 3.5–5.5)
SODIUM BLD-SCNC: 140 MMOL/L (ref 132–146)
SPECIFIC GRAVITY UA: 1.01 (ref 1–1.03)
TOTAL PROTEIN: 5.7 G/DL (ref 6.4–8.3)
TROPONIN: <0.01 NG/ML (ref 0–0.03)
UROBILINOGEN, URINE: 0.2 E.U./DL
WBC # BLD: 5.8 E9/L (ref 4.5–11.5)

## 2020-10-26 PROCEDURE — 85025 COMPLETE CBC W/AUTO DIFF WBC: CPT

## 2020-10-26 PROCEDURE — 83690 ASSAY OF LIPASE: CPT

## 2020-10-26 PROCEDURE — 6360000004 HC RX CONTRAST MEDICATION: Performed by: RADIOLOGY

## 2020-10-26 PROCEDURE — 81003 URINALYSIS AUTO W/O SCOPE: CPT

## 2020-10-26 PROCEDURE — 84484 ASSAY OF TROPONIN QUANT: CPT

## 2020-10-26 PROCEDURE — 83880 ASSAY OF NATRIURETIC PEPTIDE: CPT

## 2020-10-26 PROCEDURE — 80053 COMPREHEN METABOLIC PANEL: CPT

## 2020-10-26 PROCEDURE — 99283 EMERGENCY DEPT VISIT LOW MDM: CPT

## 2020-10-26 PROCEDURE — 74177 CT ABD & PELVIS W/CONTRAST: CPT

## 2020-10-26 PROCEDURE — 71045 X-RAY EXAM CHEST 1 VIEW: CPT

## 2020-10-26 RX ADMIN — IOPAMIDOL 75 ML: 755 INJECTION, SOLUTION INTRAVENOUS at 19:30

## 2020-10-26 RX ADMIN — IOHEXOL 50 ML: 240 INJECTION, SOLUTION INTRATHECAL; INTRAVASCULAR; INTRAVENOUS; ORAL at 19:29

## 2020-10-26 ASSESSMENT — ENCOUNTER SYMPTOMS
VOMITING: 0
SORE THROAT: 0
COUGH: 0
WHEEZING: 0
EYE REDNESS: 0
ABDOMINAL DISTENTION: 1
EYE PAIN: 0
NAUSEA: 1
EYE DISCHARGE: 0
DIARRHEA: 0
SINUS PRESSURE: 0
ABDOMINAL PAIN: 1
BACK PAIN: 0
SHORTNESS OF BREATH: 0

## 2020-10-26 NOTE — ED PROVIDER NOTES
Patient is a 23-year-old female with a history of abdominal surgeries presenting emergency department for nausea going on for the last week, with acute abdominal distention that started yesterday night. She states her abdomen has swollen quite a bit, and she is never she is lost weight over the past few months. She denies any diarrhea, but does note she has some constipation has not had a good bowel movement last week, she says she has not used any stool softeners as she was told she was not allowed to after her bowel surgery, she does not member what bowel surgery she had, and does not remember the surgeon who performed it, or when. She denies any fevers, chills, chest pain, burning with urination, or blood in her urine. She does note she has some abdominal discomfort due to distention, 9 out of 10 in severity, she is not taking anything for pain to try to help with it, denies any history of opioid usage, and says the pain is associated with the distention, and some nausea. Onset was yesterday. Review of Systems   Constitutional: Negative for chills and fever. HENT: Negative for ear pain, sinus pressure and sore throat. Eyes: Negative for pain, discharge and redness. Respiratory: Negative for cough, shortness of breath and wheezing. Cardiovascular: Positive for leg swelling (Patient has noticed her legs have been swelling more, she denies being on any water pills). Negative for chest pain. Gastrointestinal: Positive for abdominal distention, abdominal pain and nausea. Negative for diarrhea and vomiting. Genitourinary: Negative for dysuria and frequency. Musculoskeletal: Negative for arthralgias and back pain. Skin: Negative for rash and wound. Neurological: Negative for weakness and headaches. Hematological: Negative for adenopathy. All other systems reviewed and are negative. Physical Exam  Vitals signs and nursing note reviewed.    Constitutional:       Appearance: Normal appearance. HENT:      Head: Normocephalic and atraumatic. Right Ear: External ear normal.      Left Ear: External ear normal.      Nose: Nose normal.      Mouth/Throat:      Mouth: Mucous membranes are moist.   Eyes:      Extraocular Movements: Extraocular movements intact. Pupils: Pupils are equal, round, and reactive to light. Neck:      Musculoskeletal: Normal range of motion and neck supple. Cardiovascular:      Rate and Rhythm: Normal rate and regular rhythm. Pulses: Normal pulses. Heart sounds: Normal heart sounds. Pulmonary:      Effort: Pulmonary effort is normal.      Breath sounds: Normal breath sounds. Abdominal:      General: Abdomen is flat. Bowel sounds are normal. There is distension (It appears distended, but is soft. ). Palpations: Abdomen is soft. Tenderness: There is abdominal tenderness (Generalized tenderness throughout without guarding, most prominent in the periumbilical area). There is no guarding. Musculoskeletal: Normal range of motion. Skin:     General: Skin is warm and dry. Neurological:      Mental Status: She is alert. Procedures     MDM     ED Course as of Oct 26 2159   Mon Oct 26, 2020   1542 ATTENDING PROVIDER ATTESTATION:     I have personally performed and/or participated in the history, exam, medical decision making, and procedures and agree with all pertinent clinical information unless otherwise noted. I have also reviewed and agree with the past medical, family and social history unless otherwise noted. I have discussed this patient in detail with the resident, and provided the instruction and education regarding patient here complaining of several days of some constipation with nausea and abdominal discomfort and distention. History of bowel obstruction in the past.  Denies chest pain, palpitations or shortness of breath. .  My findings/plan: Patient resting comfortably in the bed no distress.   Heart rate regular, lungs are clear and equal.  Abdomen soft with mild general upper tenderness with no guarding, rebound tenderness or rigidity. No jaundice or icterus. No CVA tenderness. [NC]   1953 Patient's lab work shows a low calcium of 7.7, and a low end of normal total protein of 5.7, urinalysis was negative, CT abdomen pelvis noted splenomegaly, and a pancreatic cyst, these were noted on prior CTs, chest x-ray showed an cardiomegaly, proBNP is slightly elevated at 197, she is having some lower extremity edema, may have some component of heart failure at this time, does not appear to be in any acute distress, her vitals show an elevated blood pressure, but she is hypertension, and her pulse ox is normal not requiring any oxygen. Chest x-ray also does not appear to show any pulmonary infiltrates consistent with pulmonary edema. [JG]   1955 Feels bloated, and was having mild abdominal discomfort, but her abdominal exam was not very impressive for an acute surgical process, may have some component of gastritis at this time, we will also have her follow-up with her cardiologist due to the noted cardiomegaly and lower extremity swelling and slightly elevated proBNP, return precautions given. She was instructed to follow-up with her PCP. [JG]      ED Course User Index  [JG] Charley Spurling, MD  [NC] Marilee Montez,         ED Course as of Oct 26 2200   Mon Oct 26, 2020   1542 ATTENDING PROVIDER ATTESTATION:     I have personally performed and/or participated in the history, exam, medical decision making, and procedures and agree with all pertinent clinical information unless otherwise noted. I have also reviewed and agree with the past medical, family and social history unless otherwise noted.     I have discussed this patient in detail with the resident, and provided the instruction and education regarding patient here complaining of several days of some constipation with nausea and abdominal CPAP, and Tubal ligation status. Past Surgical History:  has a past surgical history that includes Gallbladder surgery; Breast lumpectomy; Breast reduction surgery; Toe amputation; Cholecystectomy; Cardiac catheterization (4/28/2014); Colonoscopy (7/29/15); Endoscopy, colon, diagnostic (7/19/15); Upper gastrointestinal endoscopy; lumbar laminectomy; and Tonsillectomy. Social History:  reports that she has never smoked. She has never used smokeless tobacco. She reports that she does not drink alcohol or use drugs. Family History: family history includes Cancer in her father and mother; Diabetes in her sister; Heart Disease in her sister; Other in her brother; Seizures in her son. The patients home medications have been reviewed.     Allergies: Ciprofloxacin and Codeine    -------------------------------------------------- RESULTS -------------------------------------------------  Labs:  Results for orders placed or performed during the hospital encounter of 10/26/20   CBC Auto Differential   Result Value Ref Range    WBC 5.8 4.5 - 11.5 E9/L    RBC 4.07 3.50 - 5.50 E12/L    Hemoglobin 11.3 (L) 11.5 - 15.5 g/dL    Hematocrit 37.0 34.0 - 48.0 %    MCV 90.9 80.0 - 99.9 fL    MCH 27.8 26.0 - 35.0 pg    MCHC 30.5 (L) 32.0 - 34.5 %    RDW 14.1 11.5 - 15.0 fL    Platelets 527 074 - 234 E9/L    MPV 10.2 7.0 - 12.0 fL    Neutrophils % 72.4 43.0 - 80.0 %    Immature Granulocytes % 0.2 0.0 - 5.0 %    Lymphocytes % 21.6 20.0 - 42.0 %    Monocytes % 4.1 2.0 - 12.0 %    Eosinophils % 1.0 0.0 - 6.0 %    Basophils % 0.7 0.0 - 2.0 %    Neutrophils Absolute 4.19 1.80 - 7.30 E9/L    Immature Granulocytes # 0.01 E9/L    Lymphocytes Absolute 1.25 (L) 1.50 - 4.00 E9/L    Monocytes Absolute 0.24 0.10 - 0.95 E9/L    Eosinophils Absolute 0.06 0.05 - 0.50 E9/L    Basophils Absolute 0.04 0.00 - 0.20 E9/L   Comprehensive Metabolic Panel w/ Reflex to MG   Result Value Ref Range    Sodium 140 132 - 146 mmol/L    Potassium reflex Magnesium 3.9 3.5 - 5.0 mmol/L    Chloride 106 98 - 107 mmol/L    CO2 26 22 - 29 mmol/L    Anion Gap 8 7 - 16 mmol/L    Glucose 91 74 - 99 mg/dL    BUN 14 8 - 23 mg/dL    CREATININE 0.7 0.5 - 1.0 mg/dL    GFR Non-African American >60 >=60 mL/min/1.73    GFR African American >60     Calcium 7.7 (L) 8.6 - 10.2 mg/dL    Total Protein 5.7 (L) 6.4 - 8.3 g/dL    Alb 3.8 3.5 - 5.2 g/dL    Total Bilirubin 0.3 0.0 - 1.2 mg/dL    Alkaline Phosphatase 86 35 - 104 U/L    ALT <5 0 - 32 U/L    AST 10 0 - 31 U/L   Lipase   Result Value Ref Range    Lipase 17 13 - 60 U/L   Urinalysis, reflex to microscopic   Result Value Ref Range    Color, UA Yellow Straw/Yellow    Clarity, UA Clear Clear    Glucose, Ur Negative Negative mg/dL    Bilirubin Urine Negative Negative    Ketones, Urine Negative Negative mg/dL    Specific Gravity, UA 1.015 1.005 - 1.030    Blood, Urine Negative Negative    pH, UA 6.0 5.0 - 9.0    Protein, UA Negative Negative mg/dL    Urobilinogen, Urine 0.2 <2.0 E.U./dL    Nitrite, Urine Negative Negative    Leukocyte Esterase, Urine Negative Negative   Brain Natriuretic Peptide   Result Value Ref Range    Pro- (H) 0 - 125 pg/mL   Troponin   Result Value Ref Range    Troponin <0.01 0.00 - 0.03 ng/mL       Radiology:  XR CHEST 1 VIEW   Final Result   Cardiomegaly with no evidence of failure or acute infiltrates. The         CT ABDOMEN PELVIS W IV CONTRAST Additional Contrast? Oral   Final Result   No evidence of acute intra-abdominal pathology. Splenomegaly, also present on the prior examination. 1.4 cm stable cystic lesion arising from the tail of the pancreas. No inflammatory changes in the abdomen or pelvis. No significant interval change.   Sign report             ------------------------- NURSING NOTES AND VITALS REVIEWED ---------------------------  Date / Time Roomed:  10/26/2020  2:44 PM  ED Bed Assignment:  [id number]    The nursing notes within the ED encounter and vital signs as below have been reviewed. BP (!) 140/80   Pulse 70   Temp 97.5 °F (36.4 °C) (Infrared)   Resp 20   Ht 5' (1.524 m)   Wt 198 lb (89.8 kg)   SpO2 96%   BMI 38.67 kg/m²   Oxygen Saturation Interpretation: Normal      ------------------------------------------ PROGRESS NOTES ------------------------------------------  10:00 PM EDT  I have spoken with the patient and discussed todays results, in addition to providing specific details for the plan of care and counseling regarding the diagnosis and prognosis. Their questions are answered at this time and they are agreeable with the plan. I discussed at length with them reasons for immediate return here for re evaluation. They will followup with their Cardiologist and primary care physician by calling their office tomorrow. --------------------------------- ADDITIONAL PROVIDER NOTES ---------------------------------  At this time the patient is without objective evidence of an acute process requiring hospitalization or inpatient management. They have remained hemodynamically stable throughout their entire ED visit and are stable for discharge with outpatient follow-up. The plan has been discussed in detail and they are aware of the specific conditions for emergent return, as well as the importance of follow-up. Discharge Medication List as of 10/26/2020  8:05 PM          Diagnosis:  1. Acute gastritis, presence of bleeding unspecified, unspecified gastritis type    2. Cardiomegaly        Disposition:  Patient's disposition: Discharge to home  Patient's condition is stable.            Reed Portillo MD  Resident  10/26/20 5107

## 2020-12-28 RX ORDER — ROPINIROLE 0.5 MG/1
0.5 TABLET, FILM COATED ORAL 3 TIMES DAILY
Qty: 270 TABLET | Refills: 0 | Status: SHIPPED
Start: 2020-12-28 | End: 2021-06-01 | Stop reason: SDUPTHER

## 2020-12-28 RX ORDER — CARBIDOPA AND LEVODOPA 50; 200 MG/1; MG/1
1 TABLET, EXTENDED RELEASE ORAL 3 TIMES DAILY
Qty: 270 TABLET | Refills: 0 | Status: SHIPPED
Start: 2020-12-28 | End: 2021-06-01 | Stop reason: SDUPTHER

## 2020-12-28 NOTE — TELEPHONE ENCOUNTER
MA received call from Dr. Holley Ramires, PCP in [facility], stating pt is new to her practice and has run out of Sinemet. MA contacted pt who confirmed she is living in the area again and would like to re-establish robby/[facility]. Pt has been out of Sinemet for several months and almost out of Requip. Refills pending for provider signature. Pt scheduled for follow up appt 1/13 and is aware additional refills will be given at follow up.   Electronically signed by Gabriela Story on 12/28/20 at 10:56 AM EST

## 2021-01-13 ENCOUNTER — OFFICE VISIT (OUTPATIENT)
Dept: NEUROLOGY | Age: 71
End: 2021-01-13
Payer: MEDICARE

## 2021-01-13 DIAGNOSIS — R29.898 RIGHT LEG WEAKNESS: ICD-10-CM

## 2021-01-13 DIAGNOSIS — M54.41 RIGHT-SIDED LOW BACK PAIN WITH RIGHT-SIDED SCIATICA, UNSPECIFIED CHRONICITY: Primary | ICD-10-CM

## 2021-01-13 DIAGNOSIS — G20 PARKINSON DISEASE (HCC): ICD-10-CM

## 2021-01-13 PROBLEM — R60.0 BILATERAL LOWER EXTREMITY EDEMA: Status: RESOLVED | Noted: 2019-09-25 | Resolved: 2021-01-13

## 2021-01-13 PROCEDURE — G9899 SCRN MAM PERF RSLTS DOC: HCPCS | Performed by: NURSE PRACTITIONER

## 2021-01-13 PROCEDURE — 1090F PRES/ABSN URINE INCON ASSESS: CPT | Performed by: NURSE PRACTITIONER

## 2021-01-13 PROCEDURE — 99214 OFFICE O/P EST MOD 30 MIN: CPT | Performed by: NURSE PRACTITIONER

## 2021-01-13 PROCEDURE — G8399 PT W/DXA RESULTS DOCUMENT: HCPCS | Performed by: NURSE PRACTITIONER

## 2021-01-13 PROCEDURE — 4040F PNEUMOC VAC/ADMIN/RCVD: CPT | Performed by: NURSE PRACTITIONER

## 2021-01-13 PROCEDURE — 1123F ACP DISCUSS/DSCN MKR DOCD: CPT | Performed by: NURSE PRACTITIONER

## 2021-01-13 PROCEDURE — 3017F COLORECTAL CA SCREEN DOC REV: CPT | Performed by: NURSE PRACTITIONER

## 2021-01-13 PROCEDURE — G8427 DOCREV CUR MEDS BY ELIG CLIN: HCPCS | Performed by: NURSE PRACTITIONER

## 2021-01-13 RX ORDER — HYDROXYZINE PAMOATE 50 MG/1
50 CAPSULE ORAL NIGHTLY
COMMUNITY
End: 2021-08-11

## 2021-01-13 RX ORDER — TRAMADOL HYDROCHLORIDE 50 MG/1
50 TABLET ORAL EVERY 6 HOURS PRN
COMMUNITY
End: 2021-08-11

## 2021-01-13 RX ORDER — GABAPENTIN 100 MG/1
100 CAPSULE ORAL EVERY 8 HOURS
Status: ON HOLD | COMMUNITY
End: 2021-02-03 | Stop reason: SINTOL

## 2021-01-13 RX ORDER — POTASSIUM CHLORIDE 29.8 MG/ML
40 INJECTION INTRAVENOUS DAILY
Status: ON HOLD | COMMUNITY
End: 2021-02-10 | Stop reason: HOSPADM

## 2021-01-13 RX ORDER — LIDOCAINE 50 MG/G
1 PATCH TOPICAL DAILY
COMMUNITY
End: 2021-08-11

## 2021-01-13 RX ORDER — LOSARTAN POTASSIUM 100 MG/1
100 TABLET ORAL DAILY
COMMUNITY
End: 2021-08-11

## 2021-01-13 RX ORDER — OMEPRAZOLE 40 MG/1
40 CAPSULE, DELAYED RELEASE ORAL DAILY
Status: ON HOLD | COMMUNITY
End: 2022-07-23 | Stop reason: HOSPADM

## 2021-01-13 NOTE — PROGRESS NOTES
[address] [facility]. Moreno Simmons M.D., F.A.C.P. Liya Tristan, MONY, APRN, CNS  Alexander Ayala. Omari Nick, MSN, APRN-FNP-C  Chary Coelho MSN, APRN, FNP-C  Estrella CASANOVA PA-C  [address] MSN, APRN, FNP-C  [address] [place] [address]  [place], [address] Keven Alejandra  Phone: [phone]  Fax: [phone]        TeleMedicine Video Visit    This visit was performed as a virtual video visit using a synchronous, two-way, audio-video telehealth technology platform. Patient identification was verified at the start of the visit, including the patient's telephone number and physical location. I discussed with the patient the nature of our telehealth visits, that:     1. Due to the nature of an audio- video modality, the only components of a physical exam that could be done are the elements supported by direct observation. 2. I would evaluate the patient and recommend diagnostics and treatments based on my assessment. 3. If it was felt that the patient should be evaluated in clinic or an emergency room setting, then they would be directed there. 4. Our sessions are not being recorded and that personal health information is protected. 5. Our team would provide follow up care in person if/when the patient needs it. Patient does agree to proceed with telemedicine consultation. Patient's location: other address in [address] [place] [address] in [address] [place] [address]  location other address in [facility] other people involved in call: nurse's aide    Time spent: Greater than Not billed by time    This visit was completed virtually using Doxy. waleska ZAMORA      Sedrick Rodriguez is a 69 y.o. right handed woman. We are following her for Parkinson's disease    She is interviewed via video chat, and remains a good historian. She has not been seen over a year, having moved [place] for a brief period of time. She is now back in the area and plans on staying.     Unfortunately, she went to [facility] ER on 1/2 after developing acute low back pain and inability to walk, after picking up one of her grandchildren. She did not fall. CT of the lumbar spine was reportedly done in [place] which showed no fractures, but reportedly suggestion of muscle strain per [facility] [place] nurse (no report or images to review). Since then, the patient has been in rehab, but states she is in severe pain and unable to walk. Pains beginning in her low back and radiate into her right hip and down right leg. She feels numbness in her buttocks and has been having bowel incontinence. She states there is \"something wrong\" and she is requesting additional imaging. Low-dose gabapentin are somewhat managing her pains. Thankfully, her Parkinson's symptoms are well controlled on Requip 0.5 mill times daily as well as Sinemet CR 50/200 3 times daily. No major issues with tremors or bradykinesias. Later memory changes. No lightheadedness. Failed Neupro due to skin reaction    No chest pain or palpitations  No SOB  No vertigo, lightheadedness or loss of consciousness  No itching or bruising appreciated  No Speech or swallowing troubles    ROS otherwise negative     Medications:     Current Outpatient Medications   Medication Sig Dispense Refill    omeprazole (PRILOSEC) 20 MG delayed release capsule Take 20 mg by mouth daily      hydrOXYzine (VISTARIL) 50 MG capsule Take 50 mg by mouth nightly      lidocaine (LIDODERM) 5 % Place 1 patch onto the skin daily R hip      losartan (COZAAR) 100 MG tablet Take 100 mg by mouth daily      gabapentin (NEURONTIN) 100 MG capsule Take 100 mg by mouth every 8 hours.  potassium chloride 40 MEQ/100ML Infuse 40 mEq intravenously daily      traMADol (ULTRAM) 50 MG tablet Take 50 mg by mouth every 6 hours as needed for Pain.       rOPINIRole (REQUIP) 0.5 MG tablet Take 1 tablet by mouth 3 times daily 270 tablet 0    carbidopa-levodopa (SINEMET CR)  MG per extended release tablet Take 1 tablet by mouth 3 times daily 270 tablet 0    insulin glargine (LANTUS SOLOSTAR) 100 UNIT/ML injection pen Inject 80 Units into the skin nightly (Patient taking differently: Inject 20 Units into the skin nightly ) 5 pen 3    blood glucose test strips (ACCU-CHEK RIGOBERTO PLUS) strip 1 each by In Vitro route 2 times daily Indications: DISP ACCU-CHEK As needed.  lisinopril (PRINIVIL;ZESTRIL) 20 MG tablet Take 1 tablet by mouth every morning 30 tablet 3    amLODIPine (NORVASC) 2.5 MG tablet Take 1 tablet by mouth daily 90 tablet 0    lamoTRIgine (LAMICTAL) 150 MG tablet Take 150 mg by mouth daily   0    atorvastatin (LIPITOR) 20 MG tablet Take 1 tablet by mouth daily 90 tablet 3    insulin aspart (NOVOLOG FLEXPEN) 100 UNIT/ML injection pen INJECT 0-10 UNITS INTO THE SKIN THREE TIMES DAILY(BEFORE MEALS) SLIDING SCALE (MAX DAILY 30 UNITS) (Patient taking differently: Indications: med. approved 6/1/19-7/29/20 INJECT 0-10 UNITS INTO THE SKIN THREE TIMES DAILY(BEFORE MEALS) SLIDING SCALE (MAX DAILY 30 UNITS)) 5 pen 3    aspirin 81 MG tablet Take 81 mg by mouth nightly      traZODone (DESYREL) 50 MG tablet Take 150 mg by mouth nightly   2    desvenlafaxine (PRISTIQ) 50 MG TB24 Take 150 mg by mouth every morning        No current facility-administered medications for this visit.       Objective:     General appearance: alert, appears stated age, cooperative and in no distress observed lying in bed   Head: Appears normal--edentulous  Eyes: appear clear  Neck: Full active range of motion  Lungs: Respirations appear nonlabored  Extremities: no obvious edema or cyanosis observed in arms or legs  Skin: no visible rashes or lesions    Mental Status: alert and oriented x 4    Appropriate attention/concentration  Intact memories  Intact fundus of knowledge    Speech: no dysarthria  Language: no aphasias    Cranial Nerves:  I: smell    II: visual acuity     II: visual fields    II: pupils  appear symmetric   III,VII: ptosis None   III,IV,VI: extraocular muscles able to look around the room well   V: mastication    V: facial light touch sensation     V,VII: corneal reflex     VII: facial muscle function - upper     VII: facial muscle function - lower Normal   VIII: hearing Decreased R (wears aid)   IX: soft palate elevation     IX,X: gag reflex    XI: trapezius strength     XI: sternocleidomastoid strength  moving neck around in bed   XI: neck extension strength   able to lift head   XII: tongue strength  Normal     Motor:  Moving both arms symmetrically with full range of motion  To lift right leg off bed somewhat and bend knee, but grimaces in pain--moving foot around  Able to lift left leg fairly well and moving foot around  Treatment protruding tongue movement; no visible tremors    Coordination:   FN, FFM--normal    Gait:  Patient unable to ambulate    Laboratory/Radiology:  ry/Radiology:     Discharge records reviewed from [facility] under media tab     Assessment:     Acute low back pain with right sciatica: Reportedly no fracture on CT of the lumbar spine, but no records to review. On limited video neuro exam, she appears to have weakness in her right leg as well as LBP when attempting to lift. With perineal numbness and bowel incontinence, willl evaluate for underlying stenosis or nerve compression with MRI of the lumbar spine    Parkinson's disease: Per limited video neuro exam, her symptoms appear stable on her current Sinemet and Requip regimen.     Plan:     MRI lumbar spine without    Continue Sinemet CR 50/200 mg TID and Requip 0.5 mg TID    RTO in 6 weeks or sooner PRN    RASHARD Griffiths--CNP  2:15 PM  1/13/2021

## 2021-01-13 NOTE — PATIENT INSTRUCTIONS
Patient Education        Back Pain: Care Instructions  Your Care Instructions     Back pain has many possible causes. It is often related to problems with muscles and ligaments of the back. It may also be related to problems with the nerves, discs, or bones of the back. Moving, lifting, standing, sitting, or sleeping in an awkward way can strain the back. Sometimes you don't notice the injury until later. Arthritis is another common cause of back pain. Although it may hurt a lot, back pain usually improves on its own within several weeks. Most people recover in 12 weeks or less. Using good home treatment and being careful not to stress your back can help you feel better sooner. Follow-up care is a key part of your treatment and safety. Be sure to make and go to all appointments, and call your doctor if you are having problems. It's also a good idea to know your test results and keep a list of the medicines you take. How can you care for yourself at home? · Sit or lie in positions that are most comfortable and reduce your pain. Try one of these positions when you lie down:  ? Lie on your back with your knees bent and supported by large pillows. ? Lie on the floor with your legs on the seat of a sofa or chair. ? Lie on your side with your knees and hips bent and a pillow between your legs. ? Lie on your stomach if it does not make pain worse. · Do not sit up in bed, and avoid soft couches and twisted positions. Bed rest can help relieve pain at first, but it delays healing. Avoid bed rest after the first day of back pain. · Change positions every 30 minutes. If you must sit for long periods of time, take breaks from sitting. Get up and walk around, or lie in a comfortable position. · Try using a heating pad on a low or medium setting for 15 to 20 minutes every 2 or 3 hours. Try a warm shower in place of one session with the heating pad.   · You can also try an ice pack for 10 to 15 minutes every 2 to 3 hours. Put a thin cloth between the ice pack and your skin. · Take pain medicines exactly as directed. ? If the doctor gave you a prescription medicine for pain, take it as prescribed. ? If you are not taking a prescription pain medicine, ask your doctor if you can take an over-the-counter medicine. · Take short walks several times a day. You can start with 5 to 10 minutes, 3 or 4 times a day, and work up to longer walks. Walk on level surfaces and avoid hills and stairs until your back is better. · Return to work and other activities as soon as you can. Continued rest without activity is usually not good for your back. · To prevent future back pain, do exercises to stretch and strengthen your back and stomach. Learn how to use good posture, safe lifting techniques, and proper body mechanics. When should you call for help? Call your doctor now or seek immediate medical care if:    · You have new or worsening numbness in your legs.     · You have new or worsening weakness in your legs. (This could make it hard to stand up.)     · You lose control of your bladder or bowels. Watch closely for changes in your health, and be sure to contact your doctor if:    · You have a fever, lose weight, or don't feel well.     · You do not get better as expected. Where can you learn more? Go to https://Intent.[employer]. org and sign in to your [employer] account. Enter C420 in the [facility] box to learn more about \"Back Pain: Care Instructions. \"     If you do not have an account, please click on the \"Sign Up Now\" link. Current as of: March 2, 2020               Content Version: 12.6  © 1132-2338 [employer]. Care instructions adapted under license by [facility] ([facility]). If you have questions about a medical condition or this instruction, always ask your healthcare professional. [address] any warranty or liability for your use of this information.

## 2021-01-13 NOTE — PROGRESS NOTES
Brooke Dudley was read the following message We want to confirm that, for purposes of billing, this is a virtual visit with your provider for which we will submit a claim for reimbursement with your insurance company. You will be responsible for any copays, coinsurance amounts or other amounts not covered by your insurance company. If you do not accept this, unfortunately we will not be able to schedule or proceed with a virtual visit with the provider. Do you accept? Bushra Butler responded Yes .

## 2021-01-18 ENCOUNTER — TELEPHONE (OUTPATIENT)
Dept: NEUROLOGY | Age: 71
End: 2021-01-18

## 2021-01-18 NOTE — TELEPHONE ENCOUNTER
MRI lumbar approved with [employer] from 1/14-2/13/21, Edwin Savage #[id number]. MRI order faxed to [facility] per pt request and pt understood.   Electronically signed by Sam Johnston on 1/18/21 at 3:03 PM EST

## 2021-01-19 ENCOUNTER — TELEPHONE (OUTPATIENT)
Dept: NEUROLOGY | Age: 71
End: 2021-01-19

## 2021-01-19 NOTE — TELEPHONE ENCOUNTER
Pt requesting Ativan. Pt not sure if she has tried med before or not. Facility will need paper Rx faxed to [phone] Attn: Chel Myrick.   Electronically signed by Bettyann Shone on 1/19/21 at 1:16 PM EST

## 2021-01-19 NOTE — TELEPHONE ENCOUNTER
Patient called in stating that she needs medication for her MRI because she is claustrophobic. Shivani Montes

## 2021-01-26 ENCOUNTER — TELEPHONE (OUTPATIENT)
Dept: NEUROLOGY | Age: 71
End: 2021-01-26

## 2021-01-26 DIAGNOSIS — M54.41 RIGHT-SIDED LOW BACK PAIN WITH RIGHT-SIDED SCIATICA, UNSPECIFIED CHRONICITY: Primary | ICD-10-CM

## 2021-01-26 RX ORDER — LORAZEPAM 0.5 MG/1
0.5 TABLET ORAL ONCE
Qty: 1 TABLET | Refills: 0 | Status: SHIPPED | OUTPATIENT
Start: 2021-01-26 | End: 2021-01-26

## 2021-01-26 NOTE — TELEPHONE ENCOUNTER
Pt requesting Ativan for MRI due to claustrophobia. Rx was not written last week by other providers bc pt was not scheduled. MRI currently scheduled for 2/2/21. Facility will need paper Rx faxed to [phone] Attn: Priscilla Georges.   Electronically signed by Koki Kaplan on 1/26/21 at 3:40 PM EST

## 2021-02-02 ENCOUNTER — TELEPHONE (OUTPATIENT)
Dept: NEUROLOGY | Age: 71
End: 2021-02-02

## 2021-02-02 ENCOUNTER — HOSPITAL ENCOUNTER (OUTPATIENT)
Dept: MRI IMAGING | Age: 71
Discharge: HOME | End: 2021-02-04
Payer: MEDICARE

## 2021-02-02 DIAGNOSIS — R29.898 RIGHT LEG WEAKNESS: ICD-10-CM

## 2021-02-02 DIAGNOSIS — M54.41 RIGHT-SIDED LOW BACK PAIN WITH RIGHT-SIDED SCIATICA, UNSPECIFIED CHRONICITY: ICD-10-CM

## 2021-02-02 PROBLEM — G06.1 ABSCESS IN EPIDURAL SPACE OF LUMBAR SPINE: Status: ACTIVE | Noted: 2021-02-02

## 2021-02-02 PROCEDURE — 72148 MRI LUMBAR SPINE W/O DYE: CPT

## 2021-02-02 NOTE — TELEPHONE ENCOUNTER
MA attempted to reach pt multiple times but phone number was not working. MA contacted [facility] and informed Yanely RN of MRI lumbar results and recommendations per Amanda. Yanely understood and stated pt was still en route to [facility] from MRI. Maribel Rausch will contact EMS to have them take pt to closes ER.   Electronically signed by Ramiro Westbrook on 2/2/21 at 4:27 PM EST

## 2021-02-03 ENCOUNTER — HOSPITAL ENCOUNTER (INPATIENT)
Age: 71
LOS: 7 days | Discharge: NURSING, REHAB OR LONG-TERM CARE | DRG: 871 | End: 2021-02-10
Attending: STUDENT IN AN ORGANIZED HEALTH CARE EDUCATION/TRAINING PROGRAM | Admitting: STUDENT IN AN ORGANIZED HEALTH CARE EDUCATION/TRAINING PROGRAM
Payer: MEDICARE

## 2021-02-03 DIAGNOSIS — M62.89 PSOAS MASS: ICD-10-CM

## 2021-02-03 PROBLEM — K68.12 PSOAS ABSCESS (HCC): Status: ACTIVE | Noted: 2021-02-03

## 2021-02-03 LAB
ALBUMIN SERPL-MCNC: 3.1 G/DL (ref 3.5–5.2)
ALP BLD-CCNC: 208 U/L (ref 35–104)
ALT SERPL-CCNC: 6 U/L (ref 0–32)
ANION GAP SERPL CALCULATED.3IONS-SCNC: 10 MMOL/L (ref 7–16)
AST SERPL-CCNC: 12 U/L (ref 0–31)
BILIRUB SERPL-MCNC: 0.8 MG/DL (ref 0–1.2)
BUN BLDV-MCNC: 19 MG/DL (ref 8–23)
C-REACTIVE PROTEIN: 13.5 MG/DL (ref 0–0.4)
CALCIUM SERPL-MCNC: 9 MG/DL (ref 8.6–10.2)
CHLORIDE BLD-SCNC: 103 MMOL/L (ref 98–107)
CO2: 22 MMOL/L (ref 22–29)
CREAT SERPL-MCNC: 0.8 MG/DL (ref 0.5–1)
EKG ATRIAL RATE: 76 BPM
EKG P AXIS: 69 DEGREES
EKG P-R INTERVAL: 174 MS
EKG Q-T INTERVAL: 312 MS
EKG QRS DURATION: 90 MS
EKG QTC CALCULATION (BAZETT): 420 MS
EKG R AXIS: 146 DEGREES
EKG T AXIS: 57 DEGREES
EKG VENTRICULAR RATE: 109 BPM
GFR AFRICAN AMERICAN: >60
GFR NON-AFRICAN AMERICAN: >60 ML/MIN/1.73
GLUCOSE BLD-MCNC: 98 MG/DL (ref 74–99)
METER GLUCOSE: 115 MG/DL (ref 74–99)
METER GLUCOSE: 116 MG/DL (ref 74–99)
METER GLUCOSE: 121 MG/DL (ref 74–99)
METER GLUCOSE: 165 MG/DL (ref 74–99)
POTASSIUM REFLEX MAGNESIUM: 5 MMOL/L (ref 3.5–5)
PROCALCITONIN: 0.14 NG/ML (ref 0–0.08)
SEDIMENTATION RATE, ERYTHROCYTE: 115 MM/HR (ref 0–20)
SODIUM BLD-SCNC: 135 MMOL/L (ref 132–146)
TOTAL PROTEIN: 6.6 G/DL (ref 6.4–8.3)

## 2021-02-03 PROCEDURE — 1200000000 HC SEMI PRIVATE

## 2021-02-03 PROCEDURE — 82962 GLUCOSE BLOOD TEST: CPT

## 2021-02-03 PROCEDURE — 84145 PROCALCITONIN (PCT): CPT

## 2021-02-03 PROCEDURE — 93005 ELECTROCARDIOGRAM TRACING: CPT | Performed by: STUDENT IN AN ORGANIZED HEALTH CARE EDUCATION/TRAINING PROGRAM

## 2021-02-03 PROCEDURE — 87150 DNA/RNA AMPLIFIED PROBE: CPT

## 2021-02-03 PROCEDURE — 80053 COMPREHEN METABOLIC PANEL: CPT

## 2021-02-03 PROCEDURE — 99222 1ST HOSP IP/OBS MODERATE 55: CPT | Performed by: NEUROLOGICAL SURGERY

## 2021-02-03 PROCEDURE — 2580000003 HC RX 258: Performed by: STUDENT IN AN ORGANIZED HEALTH CARE EDUCATION/TRAINING PROGRAM

## 2021-02-03 PROCEDURE — 87040 BLOOD CULTURE FOR BACTERIA: CPT

## 2021-02-03 PROCEDURE — 85651 RBC SED RATE NONAUTOMATED: CPT

## 2021-02-03 PROCEDURE — 86140 C-REACTIVE PROTEIN: CPT

## 2021-02-03 PROCEDURE — 93010 ELECTROCARDIOGRAM REPORT: CPT | Performed by: INTERNAL MEDICINE

## 2021-02-03 PROCEDURE — 87186 SC STD MICRODIL/AGAR DIL: CPT

## 2021-02-03 PROCEDURE — 6360000002 HC RX W HCPCS: Performed by: STUDENT IN AN ORGANIZED HEALTH CARE EDUCATION/TRAINING PROGRAM

## 2021-02-03 PROCEDURE — 36415 COLL VENOUS BLD VENIPUNCTURE: CPT

## 2021-02-03 PROCEDURE — 6370000000 HC RX 637 (ALT 250 FOR IP): Performed by: STUDENT IN AN ORGANIZED HEALTH CARE EDUCATION/TRAINING PROGRAM

## 2021-02-03 RX ORDER — SODIUM CHLORIDE 0.9 % (FLUSH) 0.9 %
10 SYRINGE (ML) INJECTION PRN
Status: DISCONTINUED | OUTPATIENT
Start: 2021-02-03 | End: 2021-02-10 | Stop reason: HOSPADM

## 2021-02-03 RX ORDER — MORPHINE SULFATE 2 MG/ML
2 INJECTION, SOLUTION INTRAMUSCULAR; INTRAVENOUS EVERY 4 HOURS PRN
Status: DISCONTINUED | OUTPATIENT
Start: 2021-02-03 | End: 2021-02-10 | Stop reason: HOSPADM

## 2021-02-03 RX ORDER — TRAZODONE HYDROCHLORIDE 150 MG/1
150 TABLET ORAL NIGHTLY
Status: DISCONTINUED | OUTPATIENT
Start: 2021-02-03 | End: 2021-02-10 | Stop reason: HOSPADM

## 2021-02-03 RX ORDER — POLYETHYLENE GLYCOL 3350 17 G/17G
17 POWDER, FOR SOLUTION ORAL 2 TIMES DAILY
Status: DISCONTINUED | OUTPATIENT
Start: 2021-02-03 | End: 2021-02-10 | Stop reason: HOSPADM

## 2021-02-03 RX ORDER — TRAMADOL HYDROCHLORIDE 50 MG/1
50 TABLET ORAL EVERY 6 HOURS PRN
Status: DISCONTINUED | OUTPATIENT
Start: 2021-02-03 | End: 2021-02-10 | Stop reason: HOSPADM

## 2021-02-03 RX ORDER — INSULIN GLARGINE 100 [IU]/ML
80 INJECTION, SOLUTION SUBCUTANEOUS NIGHTLY
Status: DISCONTINUED | OUTPATIENT
Start: 2021-02-03 | End: 2021-02-09

## 2021-02-03 RX ORDER — LIDOCAINE 4 G/G
1 PATCH TOPICAL DAILY
Status: DISCONTINUED | OUTPATIENT
Start: 2021-02-03 | End: 2021-02-10 | Stop reason: HOSPADM

## 2021-02-03 RX ORDER — ROPINIROLE 0.5 MG/1
0.5 TABLET, FILM COATED ORAL 3 TIMES DAILY
Status: DISCONTINUED | OUTPATIENT
Start: 2021-02-03 | End: 2021-02-10 | Stop reason: HOSPADM

## 2021-02-03 RX ORDER — DEXTROSE MONOHYDRATE 50 MG/ML
100 INJECTION, SOLUTION INTRAVENOUS PRN
Status: DISCONTINUED | OUTPATIENT
Start: 2021-02-03 | End: 2021-02-10 | Stop reason: HOSPADM

## 2021-02-03 RX ORDER — NICOTINE POLACRILEX 4 MG
15 LOZENGE BUCCAL PRN
Status: DISCONTINUED | OUTPATIENT
Start: 2021-02-03 | End: 2021-02-10 | Stop reason: HOSPADM

## 2021-02-03 RX ORDER — CARBIDOPA AND LEVODOPA 25; 100 MG/1; MG/1
2 TABLET, EXTENDED RELEASE ORAL 3 TIMES DAILY
Status: DISCONTINUED | OUTPATIENT
Start: 2021-02-03 | End: 2021-02-10 | Stop reason: HOSPADM

## 2021-02-03 RX ORDER — CARBIDOPA AND LEVODOPA 50; 200 MG/1; MG/1
1 TABLET, EXTENDED RELEASE ORAL 3 TIMES DAILY
Status: DISCONTINUED | OUTPATIENT
Start: 2021-02-03 | End: 2021-02-03 | Stop reason: CLARIF

## 2021-02-03 RX ORDER — SODIUM CHLORIDE 450 MG/100ML
INJECTION, SOLUTION INTRAVENOUS CONTINUOUS
Status: DISCONTINUED | OUTPATIENT
Start: 2021-02-03 | End: 2021-02-10 | Stop reason: HOSPADM

## 2021-02-03 RX ORDER — DEXTROSE MONOHYDRATE 25 G/50ML
12.5 INJECTION, SOLUTION INTRAVENOUS PRN
Status: DISCONTINUED | OUTPATIENT
Start: 2021-02-03 | End: 2021-02-10 | Stop reason: HOSPADM

## 2021-02-03 RX ORDER — ASPIRIN 81 MG/1
81 TABLET, CHEWABLE ORAL NIGHTLY
Status: DISCONTINUED | OUTPATIENT
Start: 2021-02-03 | End: 2021-02-10 | Stop reason: HOSPADM

## 2021-02-03 RX ORDER — HEPARIN SODIUM 10000 [USP'U]/ML
5000 INJECTION, SOLUTION INTRAVENOUS; SUBCUTANEOUS EVERY 8 HOURS
Status: DISCONTINUED | OUTPATIENT
Start: 2021-02-03 | End: 2021-02-10 | Stop reason: HOSPADM

## 2021-02-03 RX ORDER — SODIUM CHLORIDE 0.9 % (FLUSH) 0.9 %
10 SYRINGE (ML) INJECTION EVERY 12 HOURS SCHEDULED
Status: DISCONTINUED | OUTPATIENT
Start: 2021-02-03 | End: 2021-02-10 | Stop reason: HOSPADM

## 2021-02-03 RX ORDER — ACETAMINOPHEN 650 MG/1
650 SUPPOSITORY RECTAL EVERY 6 HOURS PRN
Status: DISCONTINUED | OUTPATIENT
Start: 2021-02-03 | End: 2021-02-10 | Stop reason: HOSPADM

## 2021-02-03 RX ORDER — DESVENLAFAXINE 50 MG/1
150 TABLET, EXTENDED RELEASE ORAL EVERY MORNING
Status: DISCONTINUED | OUTPATIENT
Start: 2021-02-03 | End: 2021-02-10 | Stop reason: HOSPADM

## 2021-02-03 RX ORDER — POTASSIUM CHLORIDE 29.8 MG/ML
40 INJECTION INTRAVENOUS DAILY
Status: DISCONTINUED | OUTPATIENT
Start: 2021-02-03 | End: 2021-02-03 | Stop reason: ALTCHOICE

## 2021-02-03 RX ORDER — ACETAMINOPHEN 325 MG/1
650 TABLET ORAL EVERY 6 HOURS PRN
Status: DISCONTINUED | OUTPATIENT
Start: 2021-02-03 | End: 2021-02-10 | Stop reason: HOSPADM

## 2021-02-03 RX ORDER — PANTOPRAZOLE SODIUM 40 MG/1
40 TABLET, DELAYED RELEASE ORAL
Status: DISCONTINUED | OUTPATIENT
Start: 2021-02-03 | End: 2021-02-10 | Stop reason: HOSPADM

## 2021-02-03 RX ORDER — ATORVASTATIN CALCIUM 20 MG/1
20 TABLET, FILM COATED ORAL DAILY
Status: DISCONTINUED | OUTPATIENT
Start: 2021-02-03 | End: 2021-02-10 | Stop reason: HOSPADM

## 2021-02-03 RX ORDER — LOSARTAN POTASSIUM 50 MG/1
100 TABLET ORAL DAILY
Status: DISCONTINUED | OUTPATIENT
Start: 2021-02-03 | End: 2021-02-10 | Stop reason: HOSPADM

## 2021-02-03 RX ORDER — HYDROXYZINE PAMOATE 50 MG/1
50 CAPSULE ORAL NIGHTLY
Status: DISCONTINUED | OUTPATIENT
Start: 2021-02-03 | End: 2021-02-10 | Stop reason: HOSPADM

## 2021-02-03 RX ORDER — AMLODIPINE BESYLATE 2.5 MG/1
2.5 TABLET ORAL DAILY
Status: DISCONTINUED | OUTPATIENT
Start: 2021-02-03 | End: 2021-02-10 | Stop reason: HOSPADM

## 2021-02-03 RX ORDER — ONDANSETRON 2 MG/ML
4 INJECTION INTRAMUSCULAR; INTRAVENOUS EVERY 6 HOURS PRN
Status: DISCONTINUED | OUTPATIENT
Start: 2021-02-03 | End: 2021-02-10 | Stop reason: HOSPADM

## 2021-02-03 RX ORDER — PROMETHAZINE HYDROCHLORIDE 25 MG/1
12.5 TABLET ORAL EVERY 6 HOURS PRN
Status: DISCONTINUED | OUTPATIENT
Start: 2021-02-03 | End: 2021-02-10 | Stop reason: HOSPADM

## 2021-02-03 RX ADMIN — INSULIN GLARGINE 80 UNITS: 100 INJECTION, SOLUTION SUBCUTANEOUS at 21:31

## 2021-02-03 RX ADMIN — PANTOPRAZOLE SODIUM 40 MG: 40 TABLET, DELAYED RELEASE ORAL at 06:31

## 2021-02-03 RX ADMIN — ROPINIROLE HYDROCHLORIDE 0.5 MG: 0.5 TABLET, FILM COATED ORAL at 10:32

## 2021-02-03 RX ADMIN — ATORVASTATIN CALCIUM 20 MG: 20 TABLET, FILM COATED ORAL at 10:23

## 2021-02-03 RX ADMIN — DESVENLAFAXINE 150 MG: 50 TABLET, EXTENDED RELEASE ORAL at 10:26

## 2021-02-03 RX ADMIN — CARBIDOPA AND LEVODOPA 2 TABLET: 25; 100 TABLET, EXTENDED RELEASE ORAL at 10:24

## 2021-02-03 RX ADMIN — POLYETHYLENE GLYCOL 3350 17 G: 17 POWDER, FOR SOLUTION ORAL at 02:38

## 2021-02-03 RX ADMIN — MORPHINE SULFATE 2 MG: 2 INJECTION, SOLUTION INTRAMUSCULAR; INTRAVENOUS at 15:25

## 2021-02-03 RX ADMIN — Medication 10 ML: at 22:55

## 2021-02-03 RX ADMIN — TRAMADOL HYDROCHLORIDE 50 MG: 50 TABLET, FILM COATED ORAL at 14:08

## 2021-02-03 RX ADMIN — ASPIRIN 81 MG: 81 TABLET, CHEWABLE ORAL at 21:29

## 2021-02-03 RX ADMIN — HEPARIN SODIUM 5000 UNITS: 10000 INJECTION INTRAVENOUS; SUBCUTANEOUS at 14:12

## 2021-02-03 RX ADMIN — MORPHINE SULFATE 2 MG: 2 INJECTION, SOLUTION INTRAMUSCULAR; INTRAVENOUS at 02:39

## 2021-02-03 RX ADMIN — LAMOTRIGINE 150 MG: 100 TABLET ORAL at 10:27

## 2021-02-03 RX ADMIN — TRAZODONE HYDROCHLORIDE 150 MG: 150 TABLET ORAL at 21:29

## 2021-02-03 RX ADMIN — SODIUM CHLORIDE: 4.5 INJECTION, SOLUTION INTRAVENOUS at 15:27

## 2021-02-03 RX ADMIN — ROPINIROLE HYDROCHLORIDE 0.5 MG: 0.5 TABLET, FILM COATED ORAL at 21:29

## 2021-02-03 RX ADMIN — CARBIDOPA AND LEVODOPA 2 TABLET: 25; 100 TABLET, EXTENDED RELEASE ORAL at 14:09

## 2021-02-03 RX ADMIN — HEPARIN SODIUM 5000 UNITS: 10000 INJECTION INTRAVENOUS; SUBCUTANEOUS at 06:31

## 2021-02-03 RX ADMIN — INSULIN LISPRO 1 UNITS: 100 INJECTION, SOLUTION INTRAVENOUS; SUBCUTANEOUS at 21:32

## 2021-02-03 RX ADMIN — HEPARIN SODIUM 5000 UNITS: 10000 INJECTION INTRAVENOUS; SUBCUTANEOUS at 21:31

## 2021-02-03 RX ADMIN — LOSARTAN POTASSIUM 100 MG: 50 TABLET, FILM COATED ORAL at 10:29

## 2021-02-03 RX ADMIN — CARBIDOPA AND LEVODOPA 2 TABLET: 25; 100 TABLET, EXTENDED RELEASE ORAL at 21:29

## 2021-02-03 RX ADMIN — SODIUM CHLORIDE: 4.5 INJECTION, SOLUTION INTRAVENOUS at 02:40

## 2021-02-03 RX ADMIN — AMLODIPINE BESYLATE 2.5 MG: 2.5 TABLET ORAL at 10:21

## 2021-02-03 RX ADMIN — HYDROXYZINE PAMOATE 50 MG: 50 CAPSULE ORAL at 21:29

## 2021-02-03 RX ADMIN — ROPINIROLE HYDROCHLORIDE 0.5 MG: 0.5 TABLET, FILM COATED ORAL at 14:09

## 2021-02-03 ASSESSMENT — PAIN DESCRIPTION - PROGRESSION
CLINICAL_PROGRESSION: NOT CHANGED
CLINICAL_PROGRESSION: NOT CHANGED

## 2021-02-03 ASSESSMENT — ENCOUNTER SYMPTOMS
ABDOMINAL PAIN: 0
SHORTNESS OF BREATH: 0
PHOTOPHOBIA: 0
TROUBLE SWALLOWING: 0

## 2021-02-03 ASSESSMENT — PAIN DESCRIPTION - DESCRIPTORS
DESCRIPTORS: ACHING;CONSTANT
DESCRIPTORS: ACHING;CONSTANT;SHARP;SHOOTING

## 2021-02-03 ASSESSMENT — PAIN SCALES - GENERAL
PAINLEVEL_OUTOF10: 10
PAINLEVEL_OUTOF10: 8
PAINLEVEL_OUTOF10: 0

## 2021-02-03 ASSESSMENT — PAIN DESCRIPTION - ONSET: ONSET: ON-GOING

## 2021-02-03 ASSESSMENT — PAIN DESCRIPTION - DIRECTION: RADIATING_TOWARDS: LOWER RIGHT LEG

## 2021-02-03 ASSESSMENT — PAIN - FUNCTIONAL ASSESSMENT
PAIN_FUNCTIONAL_ASSESSMENT: PREVENTS OR INTERFERES SOME ACTIVE ACTIVITIES AND ADLS
PAIN_FUNCTIONAL_ASSESSMENT: PREVENTS OR INTERFERES SOME ACTIVE ACTIVITIES AND ADLS

## 2021-02-03 ASSESSMENT — PAIN DESCRIPTION - FREQUENCY: FREQUENCY: CONTINUOUS

## 2021-02-03 ASSESSMENT — PAIN DESCRIPTION - ORIENTATION: ORIENTATION: RIGHT;MID

## 2021-02-03 ASSESSMENT — PAIN DESCRIPTION - LOCATION: LOCATION: BACK;HIP

## 2021-02-03 NOTE — PROGRESS NOTES
Hospitalist Progress Note      SYNOPSIS: Patient admitted on 2/3/2021 as a transfer from [facility]. She had presented there with a complaint of low back pain and MRI of the lumbar spine done showed a right sacral iliac joint infection and cellulitis as well as abscess formation of the right psoas muscle and moderate to severe lumbar foraminal stenosis. She did not have any saddle anesthesia, bladder or bowel incontinence. She was transferred to [facility] in the early hours of 2/3/2021 for neurosurgery evaluation. SUBJECTIVE:    Patient seen and examined. She rated her back pain at 6/10. Review of systems is otherwise negative. Neurosurgery on board. Records reviewed. Temp (24hrs), Av.6 °F (37 °C), Min:98.1 °F (36.7 °C), Max:99.1 °F (37.3 °C)    DIET: Diet NPO Effective Now Exceptions are: Sips of Water with Meds  CODE: Full Code  No intake or output data in the 24 hours ending 21 1141    OBJECTIVE:    BP (!) 113/57   Pulse 81   Temp 99.1 °F (37.3 °C) (Temporal)   Resp 20   Ht 5' (1.524 m)   Wt 187 lb 6 oz (85 kg)   SpO2 93%   BMI 36.59 kg/m²     General appearance: No apparent distress, appears stated age and cooperative. morbidly obese  HEENT:  Conjunctivae/corneas clear. Neck: Supple. No jugular venous distention. Respiratory: Clear to auscultation bilaterally, normal respiratory effort  Cardiovascular: Regular rate rhythm, normal S1-S2  Abdomen: Soft, nontender, nondistended, obese, moderate right lower quadrant pain  Musculoskeletal: No clubbing, cyanosis, no bilateral lower extremity edema. Brisk capillary refill.    Skin:  No rashes  on visible skin  Neurologic: awake, alert and following commands     ASSESSMENT:  #Right psoas muscle abscess and cellulitis, with sacroiliac joint infection  -neurosurgery on board  -MRI of lumbar spine showed finding suspicious for infection of the right sacroiliac joint, with cellulitis and abscesses in the anterior tissue, including in the right psoas muscle; neural foraminal stenosis, worset at the left L4-5 and right L5-S1 levels  -neurosurgery advocates conservative management for now  -consult ID.   -pain meds  -blood cultures ordered    #Lumbar neural foraminal stenosis  -neurosurgery on board  -PT/OT consult  -fall precautions    #Hypertension: continue home meds    #Hyperlipidemia    #Type 2 diabetes mellitus;  Continue lantus 80 units qhs. Insulin sliding scale. Accuchecks ACHS     DVT prophylaxis:heparin    DISPOSITION: home once medically stable    Medications:  REVIEWED DAILY    Infusion Medications    dextrose      sodium chloride 75 mL/hr at 02/03/21 0240     Scheduled Medications    amLODIPine  2.5 mg Oral Daily    aspirin  81 mg Oral Nightly    atorvastatin  20 mg Oral Daily    desvenlafaxine succinate  150 mg Oral QAM    hydrOXYzine  50 mg Oral Nightly    insulin lispro  0-6 Units Subcutaneous TID WC    insulin lispro  0-3 Units Subcutaneous Nightly    insulin glargine  80 Units Subcutaneous Nightly    lamoTRIgine  150 mg Oral Daily    lidocaine  1 patch Transdermal Daily    losartan  100 mg Oral Daily    pantoprazole  40 mg Oral QAM AC    rOPINIRole  0.5 mg Oral TID    traZODone  150 mg Oral Nightly    sodium chloride flush  10 mL Intravenous 2 times per day    heparin (porcine)  5,000 Units Subcutaneous Q8H    polyethylene glycol  17 g Oral BID    carbidopa-levodopa  2 tablet Oral TID     PRN Meds: traMADol, morphine, sodium chloride flush, promethazine **OR** ondansetron, acetaminophen **OR** acetaminophen, bisacodyl, glucose, dextrose, glucagon (rDNA), dextrose    Labs:     No results for input(s): WBC, HGB, HCT, PLT in the last 72 hours.     Recent Labs     02/03/21  0254      K 5.0      CO2 22   BUN 19   CREATININE 0.8   CALCIUM 9.0       Recent Labs     02/03/21  0254   PROT 6.6   ALKPHOS 208*   ALT 6   AST 12   BILITOT 0.8       No results for input(s): INR in the last 72 hours. No results for input(s): Sari Harris in the last 72 hours. Chronic labs:    Lab Results   Component Value Date    CHOL 117 12/10/2019    TRIG 120 12/10/2019    HDL 44 12/10/2019    LDLCALC 49 12/10/2019    TSH 2.74 06/16/2017    INR 1.2 03/14/2019    LABA1C 6.5 (H) 12/10/2019       Radiology: REVIEWED DAILY    +++++++++++++++++++++++++++++++++++++++++++++++++  [place], [place]  +++++++++++++++++++++++++++++++++++++++++++++++++  NOTE: This report was transcribed using voice recognition software. Every effort was made to ensure accuracy; however, inadvertent computerized transcription errors may be present.

## 2021-02-03 NOTE — CARE COORDINATION
Spoke with Pt about Transition Plan of Care. Pt lives with her Son with 13 steps to enter. Pt uses a quad cane. PCP: Dr. Susan Santiago. Pharmacy: [facility]. Pt refuses Rehab. Son is not able to assist with IV antibiotics if needed at discharge. If HHC is needed at discharge, Pt's choice for [facility] is [facility]. Orders and Information to be be faxed ([phone]). Pt would be willing to go to [facility] if IV antibiotics are needed at discharge. Discharge Plan is to return home with [facility] or [facility]. SW/CM to follow for discharge needs.    Shital Coronado, L.S.W.  [phone]

## 2021-02-03 NOTE — H&P
Hospitalist History & Physical      PCP: Marta Brown MD    Date of Admission: 2/3/2021    Date of Service: Pt seen/examined on 2/3/2021 and is admitted to Inpatient with expected LOS greater than two midnights due to medical therapy. Placed in Observation. Chief Complaint:  Low Back Pain    History Of Present Illness:    Ms. Dwight Reilly, a 69y.o. year old female  who  has a past medical history of Anxiety, Asthma, CAD (coronary artery disease), Cancer ([facility] Chronic kidney disease, Depression, Diabetes mellitus ([facility] H/O mammogram, Hx MRSA infection, Hyperlipidemia, Hypertension, Lateral epicondylitis, SERENA on CPAP, and Tubal ligation status. Pt with PMH significant for Parkinsons disease, remote history of right breast CA s/p lumpectomy and radiotherapy, DM, HTN, HLD, and chronic pain who was transfered from [facility] ED after an MRI from [facility] revealed a right sacroiliac joint infection assoc with cellulitis and abscess formation of the right psoas muscle as well as mod to severe lumbar foraminal stenosis. Pt states in Nov of 2020 she had sustained multiple falls which resolved after she had some of her medications adjusted by her PCP. Since then she has had low back pain which has been progressively worsening and now limits her ability to ambulate. She denies any saddle anesthesia, bowel or bladder incontinence, fevers, chills, nausea, vomiting or diarrhea. She states she is compliant with her medications. Pt transferred to [facility] for neurosurgical evaluation.     Past Medical History:        Diagnosis Date    Anxiety     Asthma     CAD (coronary artery disease) 1/21/2016    Cancer [facility])  breast ca 2006    Chronic kidney disease     nephrolithiasis    Depression     Diabetes mellitus ([facility]     H/O mammogram     Hx MRSA infection     toe infection january 2012    Hyperlipidemia     Hypertension     Lateral epicondylitis     SERENA on CPAP     Tubal ligation status        Past Surgical History:        Procedure Laterality Date    BREAST LUMPECTOMY      BREAST REDUCTION SURGERY      CARDIAC CATHETERIZATION  4/28/2014    Dr. Francis Hodgkins COLONOSCOPY  7/29/15    ENDOSCOPY, COLON, DIAGNOSTIC  7/19/15    GALLBLADDER SURGERY      LUMBAR LAMINECTOMY      TOE AMPUTATION      TONSILLECTOMY      UPPER GASTROINTESTINAL ENDOSCOPY         Medications Prior to Admission:      Prior to Admission medications    Medication Sig Start Date End Date Taking? Authorizing Provider   hydrOXYzine (VISTARIL) 50 MG capsule Take 50 mg by mouth nightly   Yes Historical Provider, MD   losartan (COZAAR) 100 MG tablet Take 100 mg by mouth daily   Yes Historical Provider, MD   traMADol (ULTRAM) 50 MG tablet Take 50 mg by mouth every 6 hours as needed for Pain. Yes Historical Provider, MD   rOPINIRole (REQUIP) 0.5 MG tablet Take 1 tablet by mouth 3 times daily 12/28/20  Yes RASHARD Sierra CNP   carbidopa-levodopa (SINEMET CR)  MG per extended release tablet Take 1 tablet by mouth 3 times daily 12/28/20  Yes RASHARD Sierra CNP   insulin glargine (LANTUS SOLOSTAR) 100 UNIT/ML injection pen Inject 80 Units into the skin nightly  Patient taking differently: Inject 20 Units into the skin nightly  4/10/20  Yes April Tenorio MD   amLODIPine (NORVASC) 2.5 MG tablet Take 1 tablet by mouth daily 4/6/20  Yes April Tenorio MD   lamoTRIgine (LAMICTAL) 150 MG tablet Take 150 mg by mouth daily  10/18/19  Yes Historical Provider, MD   atorvastatin (LIPITOR) 20 MG tablet Take 1 tablet by mouth daily 10/21/19  Yes April Tenorio MD   insulin aspart (NOVOLOG FLEXPEN) 100 UNIT/ML injection pen INJECT 0-10 UNITS INTO THE SKIN THREE TIMES DAILY(BEFORE MEALS) SLIDING SCALE (MAX DAILY 30 UNITS)  Patient taking differently: Indications: med.  approved 6/1/19-7/29/20 INJECT 0-10 UNITS INTO THE SKIN THREE TIMES DAILY(BEFORE MEALS) SLIDING SCALE (MAX DAILY 30 UNITS) 7/29/19  Yes Cristopher Buck MD   aspirin 81 MG tablet Take 81 mg by mouth nightly   Yes Historical Provider, MD   traZODone (DESYREL) 50 MG tablet Take 150 mg by mouth nightly  3/6/19  Yes Historical Provider, MD   desvenlafaxine (PRISTIQ) 50 MG TB24 Take 150 mg by mouth every morning    Yes Historical Provider, MD   omeprazole (PRILOSEC) 20 MG delayed release capsule Take 20 mg by mouth daily    Historical Provider, MD   lidocaine (LIDODERM) 5 % Place 1 patch onto the skin daily R hip    Historical Provider, MD   potassium chloride 40 MEQ/100ML Infuse 40 mEq intravenously daily    Historical Provider, MD   blood glucose test strips (ACCU-CHEK RIGOBERTO PLUS) strip 1 each by In Vitro route 2 times daily Indications: DISP ACCU-CHEK As needed. Historical Provider, MD   lisinopril (PRINIVIL;ZESTRIL) 20 MG tablet Take 1 tablet by mouth every morning 4/6/20 1/13/21  Cristopher Buck MD       Allergies:  Ciprofloxacin and Codeine    Social History:    TOBACCO:   reports that she has never smoked. She has never used smokeless tobacco.  ETOH:   reports no history of alcohol use. Family History:    Reviewed in detail and negative for DM, CAD, Cancer, CVA. Positive as follows\"      Problem Relation Age of Onset    Cancer Mother         Lung Ca    Cancer Father         lung Ca    Diabetes Sister     Heart Disease Sister     Seizures Son     Other Brother         sepsis       REVIEW OF SYSTEMS:   Pertinent positives as noted in the HPI. All other systems reviewed and negative. PHYSICAL EXAM:  BP (!) 122/48   Pulse 77   Temp 98.1 °F (36.7 °C) (Oral)   Resp 18   Ht 5' (1.524 m)   Wt 187 lb 6 oz (85 kg)   SpO2 96%   BMI 36.59 kg/m²   General appearance: Mild distress due to low back pain, anxious appearing but cooperative. HEENT: Normal cephalic, atraumatic without obvious deformity. Pupils equal, round, and reactive to light. Extra ocular muscles intact. Conjunctivae/corneas clear. Neck: Supple, with full range of motion. No jugular venous distention. Trachea midline. Respiratory: CTA B/L. No rales, rhonchi, wheezes or crackles  Cardiovascular: N S1/S2. No Murmurs, rubs or gallops  Abdomen: Obese, soft and non tender to touch and palpation. + BS    Musculoskeletal: No clubbing, cyanosis, edema of bilateral lower extremities. Brisk capillary refill. Skin: Normal skin color. No rashes or lesions. Neurologic:  Neurovascularly intact without any focal sensory/motor deficits. Cranial nerves: II-XII intact, grossly non-focal. 5/5 B/L UE and LE. Psychiatric: Alert and oriented, thought content appropriate, normal insight    CBC:   No results for input(s): WBC, RBC, HGB, HCT, MCV, RDW, PLT in the last 72 hours. BMP: No results for input(s): NA, K, CL, CO2, BUN, CREATININE, MG, PHOS in the last 72 hours. Invalid input(s): CA  LFT:  No results for input(s): PROT, ALB, ALKPHOS, ALT, AST, BILITOT, AMYLASE, LIPASE in the last 72 hours. CE:  No results for input(s): Burnice Broome in the last 72 hours. PT/INR: No results for input(s): INR, APTT in the last 72 hours. BNP: No results for input(s): BNP in the last 72 hours.   ESR:   Lab Results   Component Value Date    SEDRATE 18 08/20/2013     CRP:   Lab Results   Component Value Date    CRP 1.5 (H) 08/20/2013     D Dimer: No results found for: DDIMER   Folate and B12: No results found for: [id number], No results found for: FOLATE  Lactic Acid:   Lab Results   Component Value Date    LACTA 1.6 05/29/2019     Thyroid Studies:   Lab Results   Component Value Date    TSH 2.74 06/16/2017    Y6CDVQQ 8.7 07/11/2016       Oupatient labs:  Lab Results   Component Value Date    CHOL 117 12/10/2019    TRIG 120 12/10/2019    HDL 44 12/10/2019    LDLCALC 49 12/10/2019    TSH 2.74 06/16/2017    INR 1.2 03/14/2019    LABA1C 6.5 (H) 12/10/2019       Urinalysis:    Lab Results   Component Value Date    NITRU Negative 10/26/2020 WBCUA 2-5 05/29/2019    BACTERIA RARE 05/29/2019    RBCUA NONE 05/29/2019    RBCUA NONE 03/25/2013    BLOODU Negative 10/26/2020    SPECGRAV 1.015 10/26/2020    GLUCOSEU Negative 10/26/2020       Imaging:  Mri Lumbar Spine Wo Contrast    Result Date: 2/2/2021  EXAMINATION: MRI OF THE LUMBAR SPINE WITHOUT CONTRAST, 2/2/2021 3:29 pm TECHNIQUE: Multiplanar multisequence MRI of the lumbar spine was performed without the administration of intravenous contrast. COMPARISON: None. HISTORY: ORDERING SYSTEM PROVIDED HISTORY: Right-sided low back pain with right-sided sciatica, unspecified chronicity TECHNOLOGIST PROVIDED HISTORY: Reason for exam:->low back pain; R leg weakness; eval for stenosis FINDINGS: BONES/ALIGNMENT: Marrow edema is seen along the partially visualized right sacroiliac joint. Edema extends into the soft tissues anterior to the sacroiliac joint, including within the right psoas muscle. Multiple cystic appearing locules are seen in ther right psoas muscle, with the largest collection measuring approximately 6.2 x 3.8 cm in the maximal axial plane. The fluid collections likely represent abscesses. MRI of the lumbar spine reveals no evidence of discitis or osteomyelitis. No fracture or joint dislocation is seen. SPINAL CORD: The conus terminates normally. SOFT TISSUES: No paraspinal mass identified. L1-L2: There is no significant disc herniation, spinal canal stenosis or neural foraminal narrowing. L2-L3: There is no significant disc herniation, spinal canal stenosis or neural foraminal narrowing. L3-L4: Minimal disc bulge. Mild facet and ligamentous hypertrophy. No significant central canal or lateral recess stenosis. Mild neural foraminal stenoses. L4-L5: Severe central canal stenosis with small disc osteophyte complex. Mild facet and ligamentous hypertrophy. Left L4-5 hemilaminectomy. No central canal stenosis. Mild lateral recess stenoses.   Moderate to severe left and moderate right neural foraminal stenoses. L5-S1: Small disc bulge with small annular tear in the posterior midline disc. Mild facet hypertrophy. No significant central canal or lateral recess stenosis. Moderate to severe right and mild-to-moderate left neural foraminal stenoses. 1. Findings suspicious for infection involving the right sacroiliac joint, with cellulitis and abscesses in the anterior soft tissue, including multilocular abscesses in the right psoas muscle. Further evaluation with pre and post contrast enhanced MRI of the sacrum is recommended. 2. No evidence of fracture or infection in the lumbar spine. 3. Degenerative changes in the lumbar spine. No central canal stenosis. 4. Neural foraminal stenoses in the lower lumbar spine, worst (moderate to severe) at the left L4-5 and right L5-S1 levels. ASSESSMENT:    Active Problems:    Abscess in epidural space of lumbar spine  Resolved Problems:    * No resolved hospital problems.  *      PLAN:    Low Back Pain  Sacroiliac Joint Infection  Cellulitis with Multiloculated abscess of Right Psoas Muscle  - Obtain MRI with and without of sacral spine  - Obtain Neurology evaluation  - Obtain Blood Cx, CBC, CMP, Mg, Phos, PT, PTT, INR, ESR, CRP, Procal  - Hold abx in interim as pt is afebrile and hemodynamically stable to increase yield on abscess drainage  - Consider empiric abx and ID consult pending abscess drainage eval by neurosurgery  - Pain control  - Gentle IVF  - PT    Lumbar Neural foraminal Stenoses  - Neurosurgery as above    Parkinsons Disease  - Cont home meds  - Monitor    HTN  HLD  - Cont home meds  - Obtain baseline EKD    Diet: Diet NPO Effective Now Exceptions are: Sips of Water with Meds  Code Status: Full Code  Surrogate decision maker confirmed with patient:   Extended Emergency Contact Information  Primary Emergency Contact: Tom Thomas   [address] Phone: [phone]  Mobile Phone: [phone]  Relation: Child

## 2021-02-03 NOTE — CONSULTS
Not on file    Transportation needs     Medical: Not on file     Non-medical: Not on file   Tobacco Use    Smoking status: Never Smoker    Smokeless tobacco: Never Used   Substance and Sexual Activity    Alcohol use: No    Drug use: No    Sexual activity: Never   Lifestyle    Physical activity     Days per week: Not on file     Minutes per session: Not on file    Stress: Not on file   Relationships    Social connections     Talks on phone: Not on file     Gets together: Not on file     Attends [employer] service: Not on file     Active member of club or organization: Not on file     Attends meetings of clubs or organizations: Not on file     Relationship status: Not on file    Intimate partner violence     Fear of current or ex partner: Not on file     Emotionally abused: Not on file     Physically abused: Not on file     Forced sexual activity: Not on file   Other Topics Concern    Not on file   Social History Narrative    Not on file       Medications:   Current Facility-Administered Medications   Medication Dose Route Frequency Provider Last Rate Last Admin    amLODIPine (NORVASC) tablet 2.5 mg  2.5 mg Oral Daily Jayde Gottlieb MD   2.5 mg at 02/03/21 1021    aspirin chewable tablet 81 mg  81 mg Oral Nightly Jayde Gottlieb MD        atorvastatin (LIPITOR) tablet 20 mg  20 mg Oral Daily Jayde Gottlieb MD   20 mg at 02/03/21 1023    desvenlafaxine succinate (PRISTIQ) extended release tablet 150 mg  150 mg Oral QAM Jayde Gottlieb MD   150 mg at 02/03/21 1026    hydrOXYzine (VISTARIL) capsule 50 mg  50 mg Oral Nightly Jayde Gottlieb MD        insulin lispro (HUMALOG) injection vial 0-6 Units  0-6 Units Subcutaneous TID WC Jayde Gottlieb MD        insulin lispro (HUMALOG) injection vial 0-3 Units  0-3 Units Subcutaneous Nightly Jayde Gottlieb MD        insulin glargine (LANTUS) injection vial 80 Units  80 Units Subcutaneous Nightly Jayde Gottlieb MD        lamoTRIgine (LAMICTAL) tablet 150 mg  150 mg Oral Daily Geovani Roman MD   150 mg at 02/03/21 1027    lidocaine 4 % external patch 1 patch  1 patch Transdermal Daily Geovani Roman MD   1 patch at 02/03/21 1028    losartan (COZAAR) tablet 100 mg  100 mg Oral Daily Geovani Roman MD   100 mg at 02/03/21 1029    pantoprazole (PROTONIX) tablet 40 mg  40 mg Oral QAM AC Geovani Roman MD   40 mg at 02/03/21 0631    rOPINIRole (REQUIP) tablet 0.5 mg  0.5 mg Oral TID Geovani Roman MD   0.5 mg at 02/03/21 1032    traMADol (ULTRAM) tablet 50 mg  50 mg Oral Q6H PRN Geovani Roman MD        traZODone (DESYREL) tablet 150 mg  150 mg Oral Nightly Geovani Roman MD        morphine (PF) injection 2 mg  2 mg Intravenous Q4H PRN Geovani Roman MD   2 mg at 02/03/21 0239    sodium chloride flush 0.9 % injection 10 mL  10 mL Intravenous 2 times per day Geovani Roman MD        sodium chloride flush 0.9 % injection 10 mL  10 mL Intravenous PRN Geovani Roman MD        promethazine (PHENERGAN) tablet 12.5 mg  12.5 mg Oral Q6H PRN Geovani Roman MD        Or    ondansetron (ZOFRAN) injection 4 mg  4 mg Intravenous Q6H PRN Geovani Roman MD        acetaminophen (TYLENOL) tablet 650 mg  650 mg Oral Q6H PRN Geovani Roman MD        Or    acetaminophen (TYLENOL) suppository 650 mg  650 mg Rectal Q6H PRN Geovani Roman MD        heparin (porcine) injection 5,000 Units  5,000 Units Subcutaneous Q8H Geovani Roman MD   5,000 Units at 02/03/21 0631    polyethylene glycol (GLYCOLAX) packet 17 g  17 g Oral BID Geovani Roman MD   17 g at 02/03/21 0238    bisacodyl (DULCOLAX) EC tablet 5 mg  5 mg Oral Daily PRN Geovani Roman MD        glucose (GLUTOSE) 40 % oral gel 15 g  15 g Oral PRN Geovani Roman MD        dextrose 50 % IV solution  12.5 g Intravenous PRN Geovani Roman MD        glucagon (rDNA) injection 1 mg  1 mg Intramuscular PRN Geovani Roman MD        dextrose 5 % solution  100 mL/hr Intravenous PRN Geovani Roman MD        0.45 % sodium chloride infusion   Intravenous Continuous Britney Pradhan MD 75 mL/hr at 02/03/21 0240 New Bag at 02/03/21 0240    carbidopa-levodopa (SINEMET CR)  MG per extended release tablet 2 tablet  2 tablet Oral TID Britney Pradhan MD   2 tablet at 02/03/21 1024        Allergies:    Ciprofloxacin and Codeine       Review of Systems   Constitutional: Negative for fever. HENT: Negative for trouble swallowing. Eyes: Negative for photophobia. Respiratory: Negative for shortness of breath. Cardiovascular: Negative for chest pain. Gastrointestinal: Negative for abdominal pain. Endocrine: Negative for heat intolerance. Genitourinary: Negative for flank pain. Musculoskeletal: Positive for arthralgias, gait problem and myalgias. Right hip pain    Skin: Negative for wound. Neurological: Positive for weakness. Negative for numbness and headaches. Psychiatric/Behavioral: Negative for confusion. Physical Exam  Constitutional:       Appearance: Normal appearance. She is well-developed. HENT:      Head: Normocephalic. Eyes:      Extraocular Movements: Extraocular movements intact. Conjunctiva/sclera: Conjunctivae normal.      Pupils: Pupils are equal, round, and reactive to light. Neck:      Musculoskeletal: Normal range of motion and neck supple. Cardiovascular:      Rate and Rhythm: Normal rate. Pulmonary:      Effort: Pulmonary effort is normal.   Abdominal:      General: There is no distension. Musculoskeletal:      Comments: Tenderness to palpation of right psoas muscle   Skin:     General: Skin is warm and dry. Neurological:      Mental Status: She is alert. Comments: Alert and oriented x3  Upper strength full  At least 4/5 strength BLE but limited by pain   Sensation intact to light touch     Psychiatric:         Thought Content:  Thought content normal.          BP (!) 113/57   Pulse 81   Temp 99.1 °F (37.3 °C) (Temporal)   Resp 20   Ht 5' (1.524 m)   Wt 187 lb 6 oz (85 kg)   SpO2 93%   BMI 36.59 kg/m² Assessment:   Right psoas muscle abscess  Right SI joint infection     Plan:  -Recommend CT IR guided biopsy of right psoas  -No surgical intervention per neurosurgery   -Pain control  -Possible rehab for weakness      Electronically signed by Ramiro Arellano PA-C on 2/3/2021 at 12:14 PM     I have interviewed and examined the patient and agree with above. No evidence of epidural abscess. There is suspicion for abscess in the psoas muscle and SI joint. Recommend CT guided biopsy and possible drainage.     Per Lyon

## 2021-02-03 NOTE — PLAN OF CARE
Pt is a 78yo female with PMH of Parkinsons and IDDM who is being transferred form [facility] ED for infection of the right SI joint with cellulitis and abscess in the right psoas. Per [facility], case was already discussed with Neurosurgery who rec to hold abx in interim, pt to be planned for abscess drainage tomorrow. Pt reportedly hemodynamically stable without constitutional symptoms; however, K of 5.6 endorsed to me. Pt accepted to telemetry for further management.     Supriya Gleason MD  Dept of Internal Medicine  [facility] [place]

## 2021-02-03 NOTE — CONSULTS
Department of Internal Medicine  Infectious Diseases   Consult Note      Reason for Consult:  Right psoas abscess     Requesting Physician:  Dr Jayda Nolasco:                This is a 69 yrs old female presented to the [facility] ER with right sided low back pain for about one month . She denied any injury to the back , Denied fever and chills . [facility] showed right psoas abscess . She was transferred to [facility] for neurosurgery evaluation . WBC was 9.4 K, Sed rate 115, CRP 13.5  She reports pain radiating to the right , denied numbness or weakness, denied bowel or bladder incontinence .       Past Medical History:      Past Medical History:   Diagnosis Date    Anxiety     Asthma     CAD (coronary artery disease) 1/21/2016    Cancer ([facility] [address]  breast ca 2006    Chronic kidney disease     nephrolithiasis    Depression     Diabetes mellitus ([facility] [address]     H/O mammogram     Hx MRSA infection     toe infection january 2012    Hyperlipidemia     Hypertension     Lateral epicondylitis     SERENA on CPAP     Tubal ligation status        Past Surgical History:      Past Surgical History:   Procedure Laterality Date    BREAST LUMPECTOMY      BREAST REDUCTION SURGERY      CARDIAC CATHETERIZATION  4/28/2014    Dr. Balderas Blunt COLONOSCOPY  7/29/15    ENDOSCOPY, COLON, DIAGNOSTIC  7/19/15    GALLBLADDER SURGERY      LUMBAR LAMINECTOMY      TOE AMPUTATION      TONSILLECTOMY      UPPER GASTROINTESTINAL ENDOSCOPY           Current Medications:      Current Facility-Administered Medications   Medication Dose Route Frequency Provider Last Rate Last Admin    amLODIPine (NORVASC) tablet 2.5 mg  2.5 mg Oral Daily Victor Hugo Humphries MD   2.5 mg at 02/03/21 1021    aspirin chewable tablet 81 mg  81 mg Oral Nightly Victor Hugo Humphries MD        atorvastatin (LIPITOR) tablet 20 mg  20 mg Oral Daily Victor Hugo Humphries MD   20 mg at 02/03/21 1023    desvenlafaxine succinate (PRISTIQ) polyethylene glycol (GLYCOLAX) packet 17 g  17 g Oral BID Tamir Desai MD   17 g at 02/03/21 0238    bisacodyl (DULCOLAX) EC tablet 5 mg  5 mg Oral Daily PRN Tamir Desai MD        glucose (GLUTOSE) 40 % oral gel 15 g  15 g Oral PRN Tamir Desai MD        dextrose 50 % IV solution  12.5 g Intravenous PRN Tamir Desai MD        glucagon (rDNA) injection 1 mg  1 mg Intramuscular PRN Tamir Desai MD        dextrose 5 % solution  100 mL/hr Intravenous PRN Tamir Desai MD        0.45 % sodium chloride infusion   Intravenous Continuous Tamir Desai MD 75 mL/hr at 02/03/21 0240 New Bag at 02/03/21 0240    carbidopa-levodopa (SINEMET CR)  MG per extended release tablet 2 tablet  2 tablet Oral TID Tamir Desai MD   2 tablet at 02/03/21 1024       Allergies:  Ciprofloxacin and Codeine    Social History:      Social History     Socioeconomic History    Marital status:       Spouse name: Not on file    Number of children: Not on file    Years of education: Not on file    Highest education level: Not on file   Occupational History    Not on file   Social Needs    Financial resource strain: Not on file    Food insecurity     Worry: Not on file     Inability: Not on file    Transportation needs     Medical: Not on file     Non-medical: Not on file   Tobacco Use    Smoking status: Never Smoker    Smokeless tobacco: Never Used   Substance and Sexual Activity    Alcohol use: No    Drug use: No    Sexual activity: Never   Lifestyle    Physical activity     Days per week: Not on file     Minutes per session: Not on file    Stress: Not on file   Relationships    Social connections     Talks on phone: Not on file     Gets together: Not on file     Attends Taoist service: Not on file     Active member of club or organization: Not on file     Attends meetings of clubs or organizations: Not on file     Relationship status: Not on file    Intimate partner violence     Fear of current or ex partner: Not on file     Emotionally abused: Not on file     Physically abused: Not on file     Forced sexual activity: Not on file   Other Topics Concern    Not on file   Social History Narrative    Not on file       Family History:     Family History   Problem Relation Age of Onset    Cancer Mother         Lung Ca    Cancer Father         lung Ca    Diabetes Sister     Heart Disease Sister     Seizures Son     Other Brother         sepsis       REVIEW OF SYSTEMS:    CONSTITUTIONAL:  Denies fever, chill or rigors, nausea or vomiting. HEENT: denies blurring of vision or double vision, denies hearing problem  RESPIRATORY: denies cough, shortness of breath, sputum expectoration, chest pain. CARDIOVASCULAR:  Denies palpitation  GASTROINTESTINAL:  Denies abdomen pain, diarrhea or constipation. GENITOURINARY:  Denies burning urination or frequency of urination  INTEGUMENT: denies wound , rash  HEMATOLOGIC/LYMPHATIC:  Denies lymph node swelling, gum bleeding or easy bruising. MUSCULOSKELETAL:  Right hip , leg pain   Low back pain   NEUROLOGICAL:  Denies light headed, dizziness,    PHYSICAL EXAM:      Vitals:     BP (!) 113/57   Pulse 81   Temp 99.1 °F (37.3 °C) (Temporal)   Resp 20   Ht 5' (1.524 m)   Wt 187 lb 6 oz (85 kg)   SpO2 93%   BMI 36.59 kg/m²       General Appearance:    Awake, alert , no acute distress. Head:    Normocephalic, atraumatic   Eyes:    No pallor, no icterus,   Ears:    No obvious deformity or drainage.    Nose:   No nasal drainage   Throat:   Mucosa moist, no oral thrush   Neck:   Supple, no lymphadenopathy   Back:     no CVA tenderness   Lungs:     Clear to auscultation bilaterally, no wheeze    Heart:    Regular rate and rhythm, no murmur,   Abdomen:     Soft, non-tender, bowel sounds present    Extremities:   ++ edema    Pulses:   Dorsalis pedis palpable    Skin:   no rashes or lesions     CBC with Differential:      Lab Results   Component Value Date    WBC 5.8 10/26/2020 RBC 4.07 10/26/2020    HGB 11.3 10/26/2020    HCT 37.0 10/26/2020     10/26/2020    MCV 90.9 10/26/2020    MCH 27.8 10/26/2020    MCHC 30.5 10/26/2020    RDW 14.1 10/26/2020    NRBC 0.0 03/15/2019    SEGSPCT 74 08/20/2013    LYMPHOPCT 21.6 10/26/2020    MONOPCT 4.1 10/26/2020    MYELOPCT 0.9 03/15/2019    EOSPCT 1 06/16/2017    BASOPCT 0.7 10/26/2020    MONOSABS 0.24 10/26/2020    LYMPHSABS 1.25 10/26/2020    EOSABS 0.06 10/26/2020    BASOSABS 0.04 10/26/2020       CMP     Lab Results   Component Value Date     02/03/2021    K 5.0 02/03/2021     02/03/2021    CO2 22 02/03/2021    BUN 19 02/03/2021    CREATININE 0.8 02/03/2021    GFRAA >60 02/03/2021    LABGLOM >60 02/03/2021    GLUCOSE 98 02/03/2021    PROT 6.6 02/03/2021    LABALBU 3.1 02/03/2021    CALCIUM 9.0 02/03/2021    BILITOT 0.8 02/03/2021    ALKPHOS 208 02/03/2021    AST 12 02/03/2021    ALT 6 02/03/2021         Hepatic Function Panel:    Lab Results   Component Value Date    ALKPHOS 208 02/03/2021    ALT 6 02/03/2021    AST 12 02/03/2021    PROT 6.6 02/03/2021    BILITOT 0.8 02/03/2021    BILIDIR 0.2 07/20/2013    LABALBU 3.1 02/03/2021       PT/INR:    Lab Results   Component Value Date    PROTIME 13.3 03/14/2019    INR 1.2 03/14/2019       TSH:    Lab Results   Component Value Date    TSH 2.74 06/16/2017       U/A:    Lab Results   Component Value Date    COLORU Yellow 10/26/2020    PHUR 6.0 10/26/2020    WBCUA 2-5 05/29/2019    RBCUA NONE 05/29/2019    RBCUA NONE 03/25/2013    YEAST RARE 10/27/2015    BACTERIA RARE 05/29/2019    CLARITYU Clear 10/26/2020    SPECGRAV 1.015 10/26/2020    LEUKOCYTESUR Negative 10/26/2020    UROBILINOGEN 0.2 10/26/2020    BILIRUBINUR Negative 10/26/2020    BLOODU Negative 10/26/2020    GLUCOSEU Negative 10/26/2020       ABG:  No results found for: WRD2RAF, BEART, Z0EYNCLI, PHART, THGBART, NFH4QPD, PO2ART, EZG6MIR    MICROBIOLOGY:    Blood culture - neg to date       Radiology :   MRI -   1.  Findings suspicious for infection involving the right sacroiliac joint,   with cellulitis and abscesses in the anterior soft tissue, including   multilocular abscesses in the right psoas muscle.  Further evaluation with   pre and post contrast enhanced MRI of the sacrum is recommended. 2. No evidence of fracture or infection in the lumbar spine. 3. Degenerative changes in the lumbar spine.  No central canal stenosis. 4. Neural foraminal stenoses in the lower lumbar spine, worst (moderate to   severe) at the left L4-5 and right L5-S1 levels. IMPRESSION:     1. Right psoas abscess     RECOMMENDATIONS:      1. Hold off antibiotics now   2. Aspiration , biopsy, cultures   3.  Pt will need PICC line     Thank you Dr Enriqueta Santiago for the consult

## 2021-02-04 ENCOUNTER — APPOINTMENT (OUTPATIENT)
Dept: CT IMAGING | Age: 71
DRG: 871 | End: 2021-02-04
Attending: STUDENT IN AN ORGANIZED HEALTH CARE EDUCATION/TRAINING PROGRAM
Payer: MEDICARE

## 2021-02-04 LAB
ACINETOBACTER BAUMANNII BY PCR: NOT DETECTED
APTT: 32.2 SEC (ref 24.5–35.1)
BOTTLE TYPE: ABNORMAL
CANDIDA ALBICANS BY PCR: NOT DETECTED
CANDIDA GLABRATA BY PCR: NOT DETECTED
CANDIDA KRUSEI BY PCR: NOT DETECTED
CANDIDA PARAPSILOSIS BY PCR: NOT DETECTED
CANDIDA TROPICALIS BY PCR: NOT DETECTED
ENTEROBACTER CLOACAE COMPLEX BY PCR: NOT DETECTED
ENTEROBACTERALES BY PCR: NOT DETECTED
ENTEROCOCCUS BY PCR: NOT DETECTED
ESCHERICHIA COLI BY PCR: NOT DETECTED
HAEMOPHILUS INFLUENZAE BY PCR: NOT DETECTED
HCT VFR BLD CALC: 25.5 % (ref 34–48)
HEMOGLOBIN: 8 G/DL (ref 11.5–15.5)
INR BLD: 1.4
KLEBSIELLA OXYTOCA BY PCR: NOT DETECTED
KLEBSIELLA PNEUMONIAE GROUP BY PCR: NOT DETECTED
LISTERIA MONOCYTOGENES BY PCR: NOT DETECTED
MCH RBC QN AUTO: 27.1 PG (ref 26–35)
MCHC RBC AUTO-ENTMCNC: 31.4 % (ref 32–34.5)
MCV RBC AUTO: 86.4 FL (ref 80–99.9)
METER GLUCOSE: 111 MG/DL (ref 74–99)
METER GLUCOSE: 149 MG/DL (ref 74–99)
METER GLUCOSE: 73 MG/DL (ref 74–99)
METER GLUCOSE: 87 MG/DL (ref 74–99)
METER GLUCOSE: 91 MG/DL (ref 74–99)
METHICILLIN RESISTANCE MECA/C  BY PCR: NOT DETECTED
NEISSERIA MENINGITIDIS BY PCR: NOT DETECTED
ORDER NUMBER: ABNORMAL
PDW BLD-RTO: 15 FL (ref 11.5–15)
PLATELET # BLD: 377 E9/L (ref 130–450)
PMV BLD AUTO: 8.6 FL (ref 7–12)
PROTEUS SPECIES BY PCR: NOT DETECTED
PROTHROMBIN TIME: 15.8 SEC (ref 9.3–12.4)
PSEUDOMONAS AERUGINOSA BY PCR: NOT DETECTED
RBC # BLD: 2.95 E12/L (ref 3.5–5.5)
SARS-COV-2, NAAT: NOT DETECTED
SERRATIA MARCESCENS BY PCR: NOT DETECTED
SOURCE OF BLOOD CULTURE: ABNORMAL
STAPHYLOCOCCUS AUREUS BY PCR: DETECTED
STAPHYLOCOCCUS SPECIES BY PCR: DETECTED
STREPTOCOCCUS AGALACTIAE BY PCR: NOT DETECTED
STREPTOCOCCUS PNEUMONIAE BY PCR: NOT DETECTED
STREPTOCOCCUS PYOGENES  BY PCR: NOT DETECTED
STREPTOCOCCUS SPECIES BY PCR: NOT DETECTED
WBC # BLD: 7.9 E9/L (ref 4.5–11.5)

## 2021-02-04 PROCEDURE — 36415 COLL VENOUS BLD VENIPUNCTURE: CPT

## 2021-02-04 PROCEDURE — 2500000003 HC RX 250 WO HCPCS: Performed by: INTERNAL MEDICINE

## 2021-02-04 PROCEDURE — U0002 COVID-19 LAB TEST NON-CDC: HCPCS

## 2021-02-04 PROCEDURE — 85730 THROMBOPLASTIN TIME PARTIAL: CPT

## 2021-02-04 PROCEDURE — 6360000004 HC RX CONTRAST MEDICATION: Performed by: RADIOLOGY

## 2021-02-04 PROCEDURE — 6360000002 HC RX W HCPCS: Performed by: STUDENT IN AN ORGANIZED HEALTH CARE EDUCATION/TRAINING PROGRAM

## 2021-02-04 PROCEDURE — 2580000003 HC RX 258: Performed by: RADIOLOGY

## 2021-02-04 PROCEDURE — 85610 PROTHROMBIN TIME: CPT

## 2021-02-04 PROCEDURE — 74178 CT ABD&PLV WO CNTR FLWD CNTR: CPT

## 2021-02-04 PROCEDURE — 74178 CT ABD&PLV WO CNTR FLWD CNTR: CPT | Performed by: RADIOLOGY

## 2021-02-04 PROCEDURE — 85027 COMPLETE CBC AUTOMATED: CPT

## 2021-02-04 PROCEDURE — 2580000003 HC RX 258: Performed by: STUDENT IN AN ORGANIZED HEALTH CARE EDUCATION/TRAINING PROGRAM

## 2021-02-04 PROCEDURE — 6370000000 HC RX 637 (ALT 250 FOR IP): Performed by: STUDENT IN AN ORGANIZED HEALTH CARE EDUCATION/TRAINING PROGRAM

## 2021-02-04 PROCEDURE — 2580000003 HC RX 258: Performed by: INTERNAL MEDICINE

## 2021-02-04 PROCEDURE — 82962 GLUCOSE BLOOD TEST: CPT

## 2021-02-04 PROCEDURE — 1200000000 HC SEMI PRIVATE

## 2021-02-04 RX ORDER — SODIUM CHLORIDE 0.9 % (FLUSH) 0.9 %
10 SYRINGE (ML) INJECTION
Status: COMPLETED | OUTPATIENT
Start: 2021-02-04 | End: 2021-02-04

## 2021-02-04 RX ADMIN — HYDROXYZINE PAMOATE 50 MG: 50 CAPSULE ORAL at 22:01

## 2021-02-04 RX ADMIN — IOPAMIDOL 90 ML: 755 INJECTION, SOLUTION INTRAVENOUS at 13:27

## 2021-02-04 RX ADMIN — TRAZODONE HYDROCHLORIDE 150 MG: 150 TABLET ORAL at 22:01

## 2021-02-04 RX ADMIN — DESVENLAFAXINE 150 MG: 50 TABLET, EXTENDED RELEASE ORAL at 10:12

## 2021-02-04 RX ADMIN — ROPINIROLE HYDROCHLORIDE 0.5 MG: 0.5 TABLET, FILM COATED ORAL at 10:11

## 2021-02-04 RX ADMIN — ROPINIROLE HYDROCHLORIDE 0.5 MG: 0.5 TABLET, FILM COATED ORAL at 13:51

## 2021-02-04 RX ADMIN — PANTOPRAZOLE SODIUM 40 MG: 40 TABLET, DELAYED RELEASE ORAL at 05:17

## 2021-02-04 RX ADMIN — ROPINIROLE HYDROCHLORIDE 0.5 MG: 0.5 TABLET, FILM COATED ORAL at 22:01

## 2021-02-04 RX ADMIN — INSULIN LISPRO 1 UNITS: 100 INJECTION, SOLUTION INTRAVENOUS; SUBCUTANEOUS at 22:25

## 2021-02-04 RX ADMIN — Medication 10 ML: at 13:27

## 2021-02-04 RX ADMIN — SODIUM CHLORIDE: 4.5 INJECTION, SOLUTION INTRAVENOUS at 05:17

## 2021-02-04 RX ADMIN — NAFCILLIN SODIUM 2000 MG: 2 INJECTION, POWDER, FOR SOLUTION INTRAMUSCULAR; INTRAVENOUS at 16:22

## 2021-02-04 RX ADMIN — HEPARIN SODIUM 5000 UNITS: 10000 INJECTION INTRAVENOUS; SUBCUTANEOUS at 13:51

## 2021-02-04 RX ADMIN — TRAMADOL HYDROCHLORIDE 50 MG: 50 TABLET, FILM COATED ORAL at 22:42

## 2021-02-04 RX ADMIN — HEPARIN SODIUM 5000 UNITS: 10000 INJECTION INTRAVENOUS; SUBCUTANEOUS at 05:17

## 2021-02-04 RX ADMIN — CARBIDOPA AND LEVODOPA 2 TABLET: 25; 100 TABLET, EXTENDED RELEASE ORAL at 22:01

## 2021-02-04 RX ADMIN — NAFCILLIN SODIUM 2000 MG: 2 INJECTION, POWDER, FOR SOLUTION INTRAMUSCULAR; INTRAVENOUS at 13:53

## 2021-02-04 RX ADMIN — LAMOTRIGINE 150 MG: 100 TABLET ORAL at 10:12

## 2021-02-04 RX ADMIN — NAFCILLIN SODIUM 2000 MG: 2 INJECTION, POWDER, FOR SOLUTION INTRAMUSCULAR; INTRAVENOUS at 20:39

## 2021-02-04 RX ADMIN — CARBIDOPA AND LEVODOPA 2 TABLET: 25; 100 TABLET, EXTENDED RELEASE ORAL at 10:13

## 2021-02-04 RX ADMIN — ATORVASTATIN CALCIUM 20 MG: 20 TABLET, FILM COATED ORAL at 10:13

## 2021-02-04 RX ADMIN — CARBIDOPA AND LEVODOPA 2 TABLET: 25; 100 TABLET, EXTENDED RELEASE ORAL at 13:51

## 2021-02-04 RX ADMIN — POLYETHYLENE GLYCOL 3350 17 G: 17 POWDER, FOR SOLUTION ORAL at 22:02

## 2021-02-04 RX ADMIN — Medication 10 ML: at 22:03

## 2021-02-04 ASSESSMENT — PAIN SCALES - GENERAL
PAINLEVEL_OUTOF10: 0
PAINLEVEL_OUTOF10: 10

## 2021-02-04 ASSESSMENT — PAIN DESCRIPTION - PROGRESSION: CLINICAL_PROGRESSION: NOT CHANGED

## 2021-02-04 NOTE — PLAN OF CARE
Problem: Pain:  Goal: Pain level will decrease  Description: Pain level will decrease  2/4/2021 0049 by Jeremiah Capellan, RN  Outcome: Met This Shift  2/3/2021 1558 by Brooklynn Brown RN  Outcome: Met This Shift  Goal: Control of acute pain  Description: Control of acute pain  2/4/2021 0049 by Jeremiah Capellan, RN  Outcome: Met This Shift  2/3/2021 1558 by Brooklynn Brown RN  Outcome: Met This Shift  Goal: Control of chronic pain  Description: Control of chronic pain  Outcome: Met This Shift

## 2021-02-04 NOTE — PROGRESS NOTES
Called floor and spoke to patient's nurse, Torey Kraft RN, regarding order for pleurx catheter removal. Patient had tube feeding running all night and was made NPO at 0800 this morning. She is able to sign her own consent form. Patient did receive heparin 5,000 units 2/4/21 at . This will all be presented to Dr Rigo Owen II and floor will be notified prior to us sending for the patient. Any questions please call out department [phone].

## 2021-02-04 NOTE — PROGRESS NOTES
Department of Internal Medicine  Infectious Diseases  Progress  Note      C/C : MSSA bacteremia ,right psoas abscess     Pt is awake and alert  Denies fever  Reports back pain   Afebrile       Current Facility-Administered Medications   Medication Dose Route Frequency Provider Last Rate Last Admin    amLODIPine (NORVASC) tablet 2.5 mg  2.5 mg Oral Daily Antolin Kee MD   2.5 mg at 02/03/21 1021    aspirin chewable tablet 81 mg  81 mg Oral Nightly Antolin Kee MD   81 mg at 02/03/21 2129    atorvastatin (LIPITOR) tablet 20 mg  20 mg Oral Daily Antolin Kee MD   20 mg at 02/04/21 1013    desvenlafaxine succinate (PRISTIQ) extended release tablet 150 mg  150 mg Oral QAM Antolin Kee MD   150 mg at 02/04/21 1012    hydrOXYzine (VISTARIL) capsule 50 mg  50 mg Oral Nightly Antolin Kee MD   50 mg at 02/03/21 2129    insulin lispro (HUMALOG) injection vial 0-6 Units  0-6 Units Subcutaneous TID WC Antolin Kee MD        insulin lispro (HUMALOG) injection vial 0-3 Units  0-3 Units Subcutaneous Nightly Antolin Kee MD   1 Units at 02/03/21 2132    insulin glargine (LANTUS) injection vial 80 Units  80 Units Subcutaneous Nightly Antolin Kee MD   80 Units at 02/03/21 2131    lamoTRIgine (LAMICTAL) tablet 150 mg  150 mg Oral Daily Antolin Kee MD   150 mg at 02/04/21 1012    lidocaine 4 % external patch 1 patch  1 patch Transdermal Daily Antolin Kee MD   1 patch at 02/03/21 1028    losartan (COZAAR) tablet 100 mg  100 mg Oral Daily Antolin Kee MD   100 mg at 02/03/21 1029    pantoprazole (PROTONIX) tablet 40 mg  40 mg Oral QAM AC Bharath Thompson MD   40 mg at 02/04/21 0517    rOPINIRole (REQUIP) tablet 0.5 mg  0.5 mg Oral TID Antolin Kee MD   0.5 mg at 02/04/21 1011    traMADol (ULTRAM) tablet 50 mg  50 mg Oral Q6H PRN Antolin Kee MD   50 mg at 02/03/21 1408    traZODone (DESYREL) tablet 150 mg  150 mg Oral Nightly Antolin Kee MD   150 mg at 02/03/21 2129    morphine (PF) injection 2 mg  2 mg urination  INTEGUMENT: denies wound , rash  HEMATOLOGIC/LYMPHATIC:  Denies lymph node swelling, gum bleeding or easy bruising. MUSCULOSKELETAL:  Right hip , leg pain   Low back pain   NEUROLOGICAL:  Denies light headed, dizziness,    PHYSICAL EXAM:      Vitals:     BP (!) 82/56 Comment: instructor confirm of bp  Pulse 65   Temp 97.6 °F (36.4 °C) (Temporal)   Resp 18   Ht 5' (1.524 m)   Wt 187 lb 6 oz (85 kg)   SpO2 95%   BMI 36.59 kg/m²       General Appearance:    Awake, alert , no acute distress. Head:    Normocephalic, atraumatic   Eyes:    No pallor, no icterus,   Ears:    No obvious deformity or drainage.    Nose:   No nasal drainage   Throat:   Mucosa moist, no oral thrush   Neck:   Supple, no lymphadenopathy   Back:     no CVA tenderness   Lungs:     Clear to auscultation bilaterally, no wheeze    Heart:    Regular rate and rhythm, no murmur,   Abdomen:     Soft, non-tender, bowel sounds present    Extremities:   ++ edema    Pulses:   Dorsalis pedis palpable    Skin:   no rashes or lesions     CBC with Differential:      Lab Results   Component Value Date    WBC 7.9 02/04/2021    RBC 2.95 02/04/2021    HGB 8.0 02/04/2021    HCT 25.5 02/04/2021     02/04/2021    MCV 86.4 02/04/2021    MCH 27.1 02/04/2021    MCHC 31.4 02/04/2021    RDW 15.0 02/04/2021    NRBC 0.0 03/15/2019    SEGSPCT 74 08/20/2013    LYMPHOPCT 21.6 10/26/2020    MONOPCT 4.1 10/26/2020    MYELOPCT 0.9 03/15/2019    EOSPCT 1 06/16/2017    BASOPCT 0.7 10/26/2020    MONOSABS 0.24 10/26/2020    LYMPHSABS 1.25 10/26/2020    EOSABS 0.06 10/26/2020    BASOSABS 0.04 10/26/2020       CMP     Lab Results   Component Value Date     02/03/2021    K 5.0 02/03/2021     02/03/2021    CO2 22 02/03/2021    BUN 19 02/03/2021    CREATININE 0.8 02/03/2021    GFRAA >60 02/03/2021    LABGLOM >60 02/03/2021    GLUCOSE 98 02/03/2021    PROT 6.6 02/03/2021    LABALBU 3.1 02/03/2021    CALCIUM 9.0 02/03/2021    BILITOT 0.8 02/03/2021 ALKPHOS 208 02/03/2021    AST 12 02/03/2021    ALT 6 02/03/2021         Hepatic Function Panel:    Lab Results   Component Value Date    ALKPHOS 208 02/03/2021    ALT 6 02/03/2021    AST 12 02/03/2021    PROT 6.6 02/03/2021    BILITOT 0.8 02/03/2021    BILIDIR 0.2 07/20/2013    LABALBU 3.1 02/03/2021       PT/INR:    Lab Results   Component Value Date    PROTIME 15.8 02/04/2021    INR 1.4 02/04/2021       TSH:    Lab Results   Component Value Date    TSH 2.74 06/16/2017       U/A:    Lab Results   Component Value Date    COLORU Yellow 10/26/2020    PHUR 6.0 10/26/2020    WBCUA 2-5 05/29/2019    RBCUA NONE 05/29/2019    RBCUA NONE 03/25/2013    YEAST RARE 10/27/2015    BACTERIA RARE 05/29/2019    CLARITYU Clear 10/26/2020    SPECGRAV 1.015 10/26/2020    LEUKOCYTESUR Negative 10/26/2020    UROBILINOGEN 0.2 10/26/2020    BILIRUBINUR Negative 10/26/2020    BLOODU Negative 10/26/2020    GLUCOSEU Negative 10/26/2020       ABG:  No results found for: Pricilla Simper, PHART, THGBART, EEG8DYC, PO2ART, DIB5CQN    MICROBIOLOGY:    Blood culture -    Blood ID, Molecular [[id number]] (Abnormal)    Collected: 02/03/21 0254    Updated: 02/04/21 1012     Bottle Type Aerobic    Source of Blood Culture No site given    Order Number [id number]    Enterobacter cloacae complex by PCR Not Detected    Escherichia coli by PCR Not Detected    Klebsiella oxytoca by PCR Not Detected    Klebsiella pneumoniae group by PCR Not Detected    Proteus species by PCR Not Detected    Streptococcus agalactiae by PCR Not Detected    Staphylococcus aureus by PCR DETECTEDPanic      Serratia marcescens by PCR Not Detected    Streptococcus pneumoniae by PCR Not Detected    Streptococcus pyogenes  by PCR Not Detected    Acinetobacter baumannii by PCR Not Detected    Candida albicans by PCR Not Detected    Candida glabrata by PCR Not Detected    Candida krusei by PCR Not Detected    Candida parapsilosis by PCR Not Detected    Candida tropicalis by PCR Not Detected     Enterobacteriaceae by PCR Not Detected    Enterococcus by PCR Not Detected    Haemophilus Influenzae by PCR Not Detected    Listeria monocytogenes by PCR Not Detected    Neisseria meningitidis by PCR Not Detected    Pseudomonas aeruginosa by PCR Not Detected    Staphylococcus species by PCR DETECTEDPanic      Streptococcus species by PCR Not Detected    Methicillin Resistance mecA/C  by PCR Not Detected   Narrative:     CALL  Chris Dennison.[place] tel. ,   Microbiology results called to and read back by Richard Or RN, 02/04/2021            Radiology :   MRI -   1. Findings suspicious for infection involving the right sacroiliac joint,   with cellulitis and abscesses in the anterior soft tissue, including   multilocular abscesses in the right psoas muscle.  Further evaluation with   pre and post contrast enhanced MRI of the sacrum is recommended. 2. No evidence of fracture or infection in the lumbar spine. 3. Degenerative changes in the lumbar spine.  No central canal stenosis. 4. Neural foraminal stenoses in the lower lumbar spine, worst (moderate to   severe) at the left L4-5 and right L5-S1 levels. IMPRESSION:     1. Right psoas abscess   2. MSSA bacteremia     RECOMMENDATIONS:      1. Nafcillin 2 grams IV q 4 hrs   2. Abscess drainage , biopsy, cultures   3. Pt will need PICC line   4. Follow up blood cx       Thank you Dr Haley Plata for the consult

## 2021-02-04 NOTE — PROGRESS NOTES
Hospitalist Progress Note      SYNOPSIS: Patient admitted on 2/3/2021 as a transfer from [address] [place] [address]. She had presented there with a complaint of low back pain and MRI of the lumbar spine done showed a right sacral iliac joint infection and cellulitis as well as abscess formation of the right psoas muscle and moderate to severe lumbar foraminal stenosis. She did not have any saddle anesthesia, bladder or bowel incontinence. She was transferred to [facility] in the early hours of 2/3/2021 for neurosurgery evaluation. Neurosurgery evaluated her and advocated for nonsurgical management. ID consulted. SUBJECTIVE:    Patient seen and examined. ID requested a right psoas abscess biopsy for today/; pau however insisted on eating her breakfast this morning, and said she didn't want to have the procedure today. She has no other complaints; review of systems is otherwise negative. Records reviewed. Temp (24hrs), Av.7 °F (36.5 °C), Min:97.6 °F (36.4 °C), Max:97.8 °F (36.6 °C)    DIET: Diet NPO Effective Now Exceptions are: Sips of Water with Meds  CODE: Full Code    Intake/Output Summary (Last 24 hours) at 2021 1354  Last data filed at 2021 0409  Gross per 24 hour   Intake --   Output 2000 ml   Net -2000 ml       OBJECTIVE:    BP (!) 100/58   Pulse 74   Temp 97.8 °F (36.6 °C) (Temporal)   Resp 20   Ht 5' (1.524 m)   Wt 187 lb 6 oz (85 kg)   SpO2 97%   BMI 36.59 kg/m²     General appearance: No apparent distress, appears stated age and cooperative, morbidly obese  HEENT:  Conjunctivae/corneas clear. Neck: Supple. No jugular venous distention. Respiratory: Clear to auscultation bilaterally, normal respiratory effort  Cardiovascular: Regular rate rhythm, normal S1-S2  Abdomen: Soft, nontender, nondistended, mild right lower quadrant pain  Musculoskeletal: No clubbing, cyanosis, no bilateral lower extremity edema. Brisk capillary refill.    Skin:  No rashes  on Intravenous 2 times per day    heparin (porcine)  5,000 Units Subcutaneous Q8H    polyethylene glycol  17 g Oral BID    carbidopa-levodopa  2 tablet Oral TID     PRN Meds: traMADol, morphine, sodium chloride flush, promethazine **OR** ondansetron, acetaminophen **OR** acetaminophen, bisacodyl, glucose, dextrose, glucagon (rDNA), dextrose    Labs:     Recent Labs     02/04/21  0549   WBC 7.9   HGB 8.0*   HCT 25.5*          Recent Labs     02/03/21  0254      K 5.0      CO2 22   BUN 19   CREATININE 0.8   CALCIUM 9.0       Recent Labs     02/03/21  0254   PROT 6.6   ALKPHOS 208*   ALT 6   AST 12   BILITOT 0.8       Recent Labs     02/04/21  0549   INR 1.4       No results for input(s): Carolannelenluis Duqueer in the last 72 hours. Chronic labs:    Lab Results   Component Value Date    CHOL 117 12/10/2019    TRIG 120 12/10/2019    HDL 44 12/10/2019    LDLCALC 49 12/10/2019    TSH 2.74 06/16/2017    INR 1.4 02/04/2021    LABA1C 6.5 (H) 12/10/2019       Radiology: REVIEWED DAILY    +++++++++++++++++++++++++++++++++++++++++++++++++  [place], [place]  +++++++++++++++++++++++++++++++++++++++++++++++++  NOTE: This report was transcribed using voice recognition software. Every effort was made to ensure accuracy; however, inadvertent computerized transcription errors may be present.

## 2021-02-04 NOTE — PROGRESS NOTES
Dr Ramón López and I explained procedure to patient at her bedside. She is consentable to an abscess drainage with anesthesia. Anesthesia is consulted and will let us know if they can accommodate us. I spoke with LORNA Bojorquez and she will keep pt NPO until we hear back from anesthesia. Pt is voicing to have a tray she would like to eat.

## 2021-02-04 NOTE — PROGRESS NOTES
Spoke with Anesthesia and we will call for time in am. Roe Arellano RN on floor was called and will let pt eat.

## 2021-02-05 ENCOUNTER — ANESTHESIA (OUTPATIENT)
Dept: CT IMAGING | Age: 71
DRG: 871 | End: 2021-02-05
Payer: MEDICARE

## 2021-02-05 ENCOUNTER — ANESTHESIA EVENT (OUTPATIENT)
Dept: CT IMAGING | Age: 71
DRG: 871 | End: 2021-02-05
Payer: MEDICARE

## 2021-02-05 ENCOUNTER — APPOINTMENT (OUTPATIENT)
Dept: CT IMAGING | Age: 71
DRG: 871 | End: 2021-02-05
Attending: STUDENT IN AN ORGANIZED HEALTH CARE EDUCATION/TRAINING PROGRAM
Payer: MEDICARE

## 2021-02-05 ENCOUNTER — APPOINTMENT (OUTPATIENT)
Dept: MRI IMAGING | Age: 71
DRG: 871 | End: 2021-02-05
Attending: STUDENT IN AN ORGANIZED HEALTH CARE EDUCATION/TRAINING PROGRAM
Payer: MEDICARE

## 2021-02-05 VITALS
DIASTOLIC BLOOD PRESSURE: 44 MMHG | SYSTOLIC BLOOD PRESSURE: 93 MMHG | OXYGEN SATURATION: 99 % | RESPIRATION RATE: 21 BRPM

## 2021-02-05 LAB
ANION GAP SERPL CALCULATED.3IONS-SCNC: 14 MMOL/L (ref 7–16)
BASOPHILS ABSOLUTE: 0.01 E9/L (ref 0–0.2)
BASOPHILS RELATIVE PERCENT: 0.2 % (ref 0–2)
BUN BLDV-MCNC: 18 MG/DL (ref 8–23)
CALCIUM SERPL-MCNC: 8.3 MG/DL (ref 8.6–10.2)
CHLORIDE BLD-SCNC: 97 MMOL/L (ref 98–107)
CO2: 19 MMOL/L (ref 22–29)
CREAT SERPL-MCNC: 1 MG/DL (ref 0.5–1)
EOSINOPHILS ABSOLUTE: 0.04 E9/L (ref 0.05–0.5)
EOSINOPHILS RELATIVE PERCENT: 0.8 % (ref 0–6)
GFR AFRICAN AMERICAN: >60
GFR NON-AFRICAN AMERICAN: 55 ML/MIN/1.73
GLUCOSE BLD-MCNC: 93 MG/DL (ref 74–99)
HCT VFR BLD CALC: 24.3 % (ref 34–48)
HEMOGLOBIN: 7.3 G/DL (ref 11.5–15.5)
IMMATURE GRANULOCYTES #: 0.05 E9/L
IMMATURE GRANULOCYTES %: 1 % (ref 0–5)
LYMPHOCYTES ABSOLUTE: 1.04 E9/L (ref 1.5–4)
LYMPHOCYTES RELATIVE PERCENT: 21.8 % (ref 20–42)
MCH RBC QN AUTO: 26.4 PG (ref 26–35)
MCHC RBC AUTO-ENTMCNC: 30 % (ref 32–34.5)
MCV RBC AUTO: 87.7 FL (ref 80–99.9)
METER GLUCOSE: 116 MG/DL (ref 74–99)
METER GLUCOSE: 117 MG/DL (ref 74–99)
METER GLUCOSE: 81 MG/DL (ref 74–99)
MONOCYTES ABSOLUTE: 0.27 E9/L (ref 0.1–0.95)
MONOCYTES RELATIVE PERCENT: 5.6 % (ref 2–12)
NEUTROPHILS ABSOLUTE: 3.37 E9/L (ref 1.8–7.3)
NEUTROPHILS RELATIVE PERCENT: 70.6 % (ref 43–80)
PDW BLD-RTO: 15.5 FL (ref 11.5–15)
PLATELET # BLD: 317 E9/L (ref 130–450)
PMV BLD AUTO: 8.5 FL (ref 7–12)
POTASSIUM SERPL-SCNC: 4.2 MMOL/L (ref 3.5–5)
RBC # BLD: 2.77 E12/L (ref 3.5–5.5)
SODIUM BLD-SCNC: 130 MMOL/L (ref 132–146)
WBC # BLD: 4.8 E9/L (ref 4.5–11.5)

## 2021-02-05 PROCEDURE — 75989 ABSCESS DRAINAGE UNDER X-RAY: CPT | Performed by: RADIOLOGY

## 2021-02-05 PROCEDURE — 6360000002 HC RX W HCPCS: Performed by: RADIOLOGY

## 2021-02-05 PROCEDURE — 80048 BASIC METABOLIC PNL TOTAL CA: CPT

## 2021-02-05 PROCEDURE — 3700000001 HC ADD 15 MINUTES (ANESTHESIA)

## 2021-02-05 PROCEDURE — 87070 CULTURE OTHR SPECIMN AEROBIC: CPT

## 2021-02-05 PROCEDURE — 82962 GLUCOSE BLOOD TEST: CPT

## 2021-02-05 PROCEDURE — 0W9F30Z DRAINAGE OF ABDOMINAL WALL WITH DRAINAGE DEVICE, PERCUTANEOUS APPROACH: ICD-10-PCS | Performed by: RADIOLOGY

## 2021-02-05 PROCEDURE — 6360000002 HC RX W HCPCS

## 2021-02-05 PROCEDURE — 87075 CULTR BACTERIA EXCEPT BLOOD: CPT

## 2021-02-05 PROCEDURE — 6360000002 HC RX W HCPCS: Performed by: INTERNAL MEDICINE

## 2021-02-05 PROCEDURE — 36415 COLL VENOUS BLD VENIPUNCTURE: CPT

## 2021-02-05 PROCEDURE — 2580000003 HC RX 258

## 2021-02-05 PROCEDURE — 6370000000 HC RX 637 (ALT 250 FOR IP): Performed by: STUDENT IN AN ORGANIZED HEALTH CARE EDUCATION/TRAINING PROGRAM

## 2021-02-05 PROCEDURE — 87205 SMEAR GRAM STAIN: CPT

## 2021-02-05 PROCEDURE — 2500000003 HC RX 250 WO HCPCS: Performed by: INTERNAL MEDICINE

## 2021-02-05 PROCEDURE — 2580000003 HC RX 258: Performed by: INTERNAL MEDICINE

## 2021-02-05 PROCEDURE — 3700000000 HC ANESTHESIA ATTENDED CARE

## 2021-02-05 PROCEDURE — 2709999900 CT ABSCESS DRAINAGE W CATH PLACEMENT S&I

## 2021-02-05 PROCEDURE — 2500000003 HC RX 250 WO HCPCS: Performed by: RADIOLOGY

## 2021-02-05 PROCEDURE — 87040 BLOOD CULTURE FOR BACTERIA: CPT

## 2021-02-05 PROCEDURE — 87186 SC STD MICRODIL/AGAR DIL: CPT

## 2021-02-05 PROCEDURE — 6360000002 HC RX W HCPCS: Performed by: STUDENT IN AN ORGANIZED HEALTH CARE EDUCATION/TRAINING PROGRAM

## 2021-02-05 PROCEDURE — 1200000000 HC SEMI PRIVATE

## 2021-02-05 PROCEDURE — 85025 COMPLETE CBC W/AUTO DIFF WBC: CPT

## 2021-02-05 RX ORDER — PROPOFOL 10 MG/ML
INJECTION, EMULSION INTRAVENOUS CONTINUOUS PRN
Status: DISCONTINUED | OUTPATIENT
Start: 2021-02-05 | End: 2021-02-05 | Stop reason: SDUPTHER

## 2021-02-05 RX ORDER — MIDAZOLAM HYDROCHLORIDE 1 MG/ML
INJECTION INTRAMUSCULAR; INTRAVENOUS PRN
Status: DISCONTINUED | OUTPATIENT
Start: 2021-02-05 | End: 2021-02-05 | Stop reason: SDUPTHER

## 2021-02-05 RX ORDER — KETOROLAC TROMETHAMINE 30 MG/ML
INJECTION, SOLUTION INTRAMUSCULAR; INTRAVENOUS
Status: COMPLETED | OUTPATIENT
Start: 2021-02-05 | End: 2021-02-05

## 2021-02-05 RX ORDER — LIDOCAINE HYDROCHLORIDE 20 MG/ML
INJECTION, SOLUTION INFILTRATION; PERINEURAL
Status: COMPLETED | OUTPATIENT
Start: 2021-02-05 | End: 2021-02-05

## 2021-02-05 RX ORDER — LORAZEPAM 2 MG/ML
2 INJECTION INTRAMUSCULAR
Status: COMPLETED | OUTPATIENT
Start: 2021-02-05 | End: 2021-02-05

## 2021-02-05 RX ORDER — SODIUM CHLORIDE 9 MG/ML
INJECTION, SOLUTION INTRAVENOUS CONTINUOUS PRN
Status: DISCONTINUED | OUTPATIENT
Start: 2021-02-05 | End: 2021-02-05 | Stop reason: SDUPTHER

## 2021-02-05 RX ADMIN — MORPHINE SULFATE 2 MG: 2 INJECTION, SOLUTION INTRAMUSCULAR; INTRAVENOUS at 19:47

## 2021-02-05 RX ADMIN — MIDAZOLAM 1 MG: 1 INJECTION INTRAMUSCULAR; INTRAVENOUS at 13:51

## 2021-02-05 RX ADMIN — KETOROLAC TROMETHAMINE 30 MG: 30 INJECTION, SOLUTION INTRAMUSCULAR; INTRAVENOUS at 14:30

## 2021-02-05 RX ADMIN — PANTOPRAZOLE SODIUM 40 MG: 40 TABLET, DELAYED RELEASE ORAL at 05:12

## 2021-02-05 RX ADMIN — NAFCILLIN SODIUM 2000 MG: 2 INJECTION, POWDER, FOR SOLUTION INTRAMUSCULAR; INTRAVENOUS at 08:02

## 2021-02-05 RX ADMIN — NAFCILLIN SODIUM 2000 MG: 2 INJECTION, POWDER, FOR SOLUTION INTRAMUSCULAR; INTRAVENOUS at 04:03

## 2021-02-05 RX ADMIN — CARBIDOPA AND LEVODOPA 2 TABLET: 25; 100 TABLET, EXTENDED RELEASE ORAL at 23:14

## 2021-02-05 RX ADMIN — HYDROXYZINE PAMOATE 50 MG: 50 CAPSULE ORAL at 23:13

## 2021-02-05 RX ADMIN — MORPHINE SULFATE 2 MG: 2 INJECTION, SOLUTION INTRAMUSCULAR; INTRAVENOUS at 08:02

## 2021-02-05 RX ADMIN — NAFCILLIN SODIUM 2000 MG: 2 INJECTION, POWDER, FOR SOLUTION INTRAMUSCULAR; INTRAVENOUS at 12:17

## 2021-02-05 RX ADMIN — NAFCILLIN SODIUM 2000 MG: 2 INJECTION, POWDER, FOR SOLUTION INTRAMUSCULAR; INTRAVENOUS at 19:45

## 2021-02-05 RX ADMIN — NAFCILLIN SODIUM 2000 MG: 2 INJECTION, POWDER, FOR SOLUTION INTRAMUSCULAR; INTRAVENOUS at 16:21

## 2021-02-05 RX ADMIN — LIDOCAINE HYDROCHLORIDE 5 ML: 20 INJECTION, SOLUTION INFILTRATION; PERINEURAL at 14:02

## 2021-02-05 RX ADMIN — ROPINIROLE HYDROCHLORIDE 0.5 MG: 0.5 TABLET, FILM COATED ORAL at 23:17

## 2021-02-05 RX ADMIN — PROPOFOL 75 MCG/KG/MIN: 10 INJECTION, EMULSION INTRAVENOUS at 13:58

## 2021-02-05 RX ADMIN — TRAZODONE HYDROCHLORIDE 150 MG: 150 TABLET ORAL at 23:15

## 2021-02-05 RX ADMIN — LORAZEPAM 2 MG: 2 INJECTION INTRAMUSCULAR; INTRAVENOUS at 21:21

## 2021-02-05 RX ADMIN — SODIUM CHLORIDE: 9 INJECTION, SOLUTION INTRAVENOUS at 13:40

## 2021-02-05 RX ADMIN — MORPHINE SULFATE 2 MG: 2 INJECTION, SOLUTION INTRAMUSCULAR; INTRAVENOUS at 15:44

## 2021-02-05 RX ADMIN — MIDAZOLAM 1 MG: 1 INJECTION INTRAMUSCULAR; INTRAVENOUS at 13:48

## 2021-02-05 RX ADMIN — NAFCILLIN SODIUM 2000 MG: 2 INJECTION, POWDER, FOR SOLUTION INTRAMUSCULAR; INTRAVENOUS at 00:14

## 2021-02-05 ASSESSMENT — PAIN DESCRIPTION - PAIN TYPE
TYPE: CHRONIC PAIN
TYPE: CHRONIC PAIN

## 2021-02-05 ASSESSMENT — PAIN SCALES - GENERAL
PAINLEVEL_OUTOF10: 8
PAINLEVEL_OUTOF10: 0
PAINLEVEL_OUTOF10: 8
PAINLEVEL_OUTOF10: 10

## 2021-02-05 ASSESSMENT — PAIN DESCRIPTION - DESCRIPTORS: DESCRIPTORS: ACHING;CONSTANT;DISCOMFORT

## 2021-02-05 ASSESSMENT — PAIN - FUNCTIONAL ASSESSMENT: PAIN_FUNCTIONAL_ASSESSMENT: PREVENTS OR INTERFERES SOME ACTIVE ACTIVITIES AND ADLS

## 2021-02-05 ASSESSMENT — PAIN DESCRIPTION - ONSET
ONSET: ON-GOING
ONSET: ON-GOING

## 2021-02-05 ASSESSMENT — PAIN DESCRIPTION - ORIENTATION: ORIENTATION: RIGHT

## 2021-02-05 ASSESSMENT — PAIN DESCRIPTION - LOCATION: LOCATION: ABDOMEN

## 2021-02-05 NOTE — PROGRESS NOTES
2201    traMADol (ULTRAM) tablet 50 mg  50 mg Oral Q6H PRN Jayde Gottlieb MD   50 mg at 02/04/21 2242    traZODone (DESYREL) tablet 150 mg  150 mg Oral Nightly Jayde Gottlieb MD   150 mg at 02/04/21 2201    morphine (PF) injection 2 mg  2 mg Intravenous Q4H PRN Jayde Gottlieb MD   2 mg at 02/05/21 0802    sodium chloride flush 0.9 % injection 10 mL  10 mL Intravenous 2 times per day Jayde Gottlieb MD   10 mL at 02/04/21 2203    sodium chloride flush 0.9 % injection 10 mL  10 mL Intravenous PRN Jayde Gottlieb MD        promethazine (PHENERGAN) tablet 12.5 mg  12.5 mg Oral Q6H PRN Jayde Gottlieb MD        Or    ondansetron (ZOFRAN) injection 4 mg  4 mg Intravenous Q6H PRN Jayde Gottlieb MD        acetaminophen (TYLENOL) tablet 650 mg  650 mg Oral Q6H PRN Jayde Gottlieb MD        Or    acetaminophen (TYLENOL) suppository 650 mg  650 mg Rectal Q6H PRN Jayde Gottlieb MD        heparin (porcine) injection 5,000 Units  5,000 Units Subcutaneous Q8H Jayde Gottlieb MD   Stopped at 02/04/21 2203    polyethylene glycol (GLYCOLAX) packet 17 g  17 g Oral BID Jayde Gottlieb MD   17 g at 02/04/21 2202    bisacodyl (DULCOLAX) EC tablet 5 mg  5 mg Oral Daily PRN Jayde Gottlieb MD        glucose (GLUTOSE) 40 % oral gel 15 g  15 g Oral PRN Jayde Gottlieb MD        dextrose 50 % IV solution  12.5 g Intravenous PRN Jayde Gottlieb MD        glucagon (rDNA) injection 1 mg  1 mg Intramuscular PRN Jayde Gottlieb MD        dextrose 5 % solution  100 mL/hr Intravenous PRN Jayde Gottlieb MD        0.45 % sodium chloride infusion   Intravenous Continuous Jayde Gottlieb MD 75 mL/hr at 02/04/21 0517 New Bag at 02/04/21 0517    carbidopa-levodopa (SINEMET CR)  MG per extended release tablet 2 tablet  2 tablet Oral TID Jayde Gottlieb MD   2 tablet at 02/04/21 2201         REVIEW OF SYSTEMS:    CONSTITUTIONAL:  Denies fever, chill or rigors, nausea or vomiting.   HEENT: denies blurring of vision or double vision, denies hearing problem  RESPIRATORY: denies cough, shortness of breath, sputum expectoration, chest pain. CARDIOVASCULAR:  Denies palpitation  GASTROINTESTINAL:  Denies abdomen pain, diarrhea or constipation. GENITOURINARY:  Denies burning urination or frequency of urination  INTEGUMENT: denies wound , rash  HEMATOLOGIC/LYMPHATIC:  Denies lymph node swelling, gum bleeding or easy bruising. MUSCULOSKELETAL: Low back pain   NEUROLOGICAL:  Denies light headed, dizziness,    PHYSICAL EXAM:      Vitals:     BP (!) 92/54   Pulse 66   Temp 97.5 °F (36.4 °C)   Resp 19   Ht 5' (1.524 m)   Wt 187 lb 6 oz (85 kg)   SpO2 97%   BMI 36.59 kg/m²       General Appearance:    Awake, alert , no acute distress. Head:    Normocephalic, atraumatic   Eyes:    No pallor, no icterus,   Ears:    No obvious deformity or drainage.    Nose:   No nasal drainage   Throat:   Mucosa moist, no oral thrush   Neck:   Supple, no lymphadenopathy   Back:     no CVA tenderness   Lungs:     Clear to auscultation bilaterally, no wheeze    Heart:    Regular rate and rhythm, no murmur,   Abdomen:     Soft, non-tender, bowel sounds present    Extremities:   ++ edema    Pulses:   Dorsalis pedis palpable    Skin:   no rashes or lesions     CBC with Differential:      Lab Results   Component Value Date    WBC 7.9 02/04/2021    RBC 2.95 02/04/2021    HGB 8.0 02/04/2021    HCT 25.5 02/04/2021     02/04/2021    MCV 86.4 02/04/2021    MCH 27.1 02/04/2021    MCHC 31.4 02/04/2021    RDW 15.0 02/04/2021    NRBC 0.0 03/15/2019    SEGSPCT 74 08/20/2013    LYMPHOPCT 21.6 10/26/2020    MONOPCT 4.1 10/26/2020    MYELOPCT 0.9 03/15/2019    EOSPCT 1 06/16/2017    BASOPCT 0.7 10/26/2020    MONOSABS 0.24 10/26/2020    LYMPHSABS 1.25 10/26/2020    EOSABS 0.06 10/26/2020    BASOSABS 0.04 10/26/2020       CMP     Lab Results   Component Value Date     02/03/2021    K 5.0 02/03/2021     02/03/2021    CO2 22 02/03/2021    BUN 19 02/03/2021    CREATININE 0.8 02/03/2021 GFRAA >60 02/03/2021    LABGLOM >60 02/03/2021    GLUCOSE 98 02/03/2021    PROT 6.6 02/03/2021    LABALBU 3.1 02/03/2021    CALCIUM 9.0 02/03/2021    BILITOT 0.8 02/03/2021    ALKPHOS 208 02/03/2021    AST 12 02/03/2021    ALT 6 02/03/2021         Hepatic Function Panel:    Lab Results   Component Value Date    ALKPHOS 208 02/03/2021    ALT 6 02/03/2021    AST 12 02/03/2021    PROT 6.6 02/03/2021    BILITOT 0.8 02/03/2021    BILIDIR 0.2 07/20/2013    LABALBU 3.1 02/03/2021       PT/INR:    Lab Results   Component Value Date    PROTIME 15.8 02/04/2021    INR 1.4 02/04/2021       TSH:    Lab Results   Component Value Date    TSH 2.74 06/16/2017       U/A:    Lab Results   Component Value Date    COLORU Yellow 10/26/2020    PHUR 6.0 10/26/2020    WBCUA 2-5 05/29/2019    RBCUA NONE 05/29/2019    RBCUA NONE 03/25/2013    YEAST RARE 10/27/2015    BACTERIA RARE 05/29/2019    CLARITYU Clear 10/26/2020    SPECGRAV 1.015 10/26/2020    LEUKOCYTESUR Negative 10/26/2020    UROBILINOGEN 0.2 10/26/2020    BILIRUBINUR Negative 10/26/2020    BLOODU Negative 10/26/2020    GLUCOSEU Negative 10/26/2020       ABG:  No results found for: Kait Adamswig, PHART, THGBART, ULN2QCF, PO2ART, XEY4XHH    MICROBIOLOGY:    Blood culture -    Blood ID, Molecular [[id number]] (Abnormal)    Collected: 02/03/21 0254    Updated: 02/04/21 1012     Bottle Type Aerobic    Source of Blood Culture No site given    Order Number [id number]    Enterobacter cloacae complex by PCR Not Detected    Escherichia coli by PCR Not Detected    Klebsiella oxytoca by PCR Not Detected    Klebsiella pneumoniae group by PCR Not Detected    Proteus species by PCR Not Detected    Streptococcus agalactiae by PCR Not Detected    Staphylococcus aureus by PCR DETECTEDPanic      Serratia marcescens by PCR Not Detected    Streptococcus pneumoniae by PCR Not Detected    Streptococcus pyogenes  by PCR Not Detected    Acinetobacter baumannii by PCR Not Detected    Candida albicans by PCR Not Detected    Candida glabrata by PCR Not Detected    Candida krusei by PCR Not Detected    Candida parapsilosis by PCR Not Detected    Candida tropicalis by PCR Not Detected     Enterobacteriaceae by PCR Not Detected    Enterococcus by PCR Not Detected    Haemophilus Influenzae by PCR Not Detected    Listeria monocytogenes by PCR Not Detected    Neisseria meningitidis by PCR Not Detected    Pseudomonas aeruginosa by PCR Not Detected    Staphylococcus species by PCR DETECTEDPanic      Streptococcus species by PCR Not Detected    Methicillin Resistance mecA/C  by PCR Not Detected   Narrative:     CALL  [email or web address] tel. ,   Microbiology results called to and read back by Sissy Fair RN, 02/04/2021            Radiology :   MRI -   1. Findings suspicious for infection involving the right sacroiliac joint,   with cellulitis and abscesses in the anterior soft tissue, including   multilocular abscesses in the right psoas muscle.  Further evaluation with   pre and post contrast enhanced MRI of the sacrum is recommended. 2. No evidence of fracture or infection in the lumbar spine. 3. Degenerative changes in the lumbar spine.  No central canal stenosis. 4. Neural foraminal stenoses in the lower lumbar spine, worst (moderate to   severe) at the left L4-5 and right L5-S1 levels. IMPRESSION:     1. Right psoas abscess   2. MSSA bacteremia     RECOMMENDATIONS:      1. Nafcillin 2 grams IV q 4 hrs   2. Abscess drainage , biopsy, cultures   3. Pt will need PICC line   4.  Follow up blood cx   5. Echo

## 2021-02-05 NOTE — PROGRESS NOTES
sacroiliac joint infection  -neurosurgery on board  -MRI of lumbar spine showed finding suspicious for infection of the right sacroiliac joint, with cellulitis and abscesses in the anterior tissue, including in the right psoas muscle; neural foraminal stenosis, worse at the left L4-5 and right L5-S1 levels  -neurosurgery advocates conservative management for now  -ID on board: recommend right psoas abscess biopsy. -pain meds  -blood cultures growing Staph Aureus  -on nafcillin, per ID  -started on nafcillin per ID.     #Lumbar neural foraminal stenosis  -neurosurgery on board; advocate conservative management  -PT/OT consult  -fall precautions     #Hypertension: continue amlodipine and losartan     #Hyperlipidemia     #Type 2 diabetes mellitus;  -Continue lantus 80 units qhs.  - Insulin sliding scale. Accuchecks ACHS        #Anemia:  - stable.  Hb is pending today.   -transfuse if Hb <7     DVT prophylaxis:  -heparin.             DISPOSITION: home once medically stable    Medications:  REVIEWED DAILY    Infusion Medications    dextrose      sodium chloride 75 mL/hr at 02/04/21 0517     Scheduled Medications    nafcillin  2,000 mg Intravenous Q4H    amLODIPine  2.5 mg Oral Daily    aspirin  81 mg Oral Nightly    atorvastatin  20 mg Oral Daily    desvenlafaxine succinate  150 mg Oral QAM    hydrOXYzine  50 mg Oral Nightly    insulin lispro  0-6 Units Subcutaneous TID WC    insulin lispro  0-3 Units Subcutaneous Nightly    insulin glargine  80 Units Subcutaneous Nightly    lamoTRIgine  150 mg Oral Daily    lidocaine  1 patch Transdermal Daily    losartan  100 mg Oral Daily    pantoprazole  40 mg Oral QAM AC    rOPINIRole  0.5 mg Oral TID    traZODone  150 mg Oral Nightly    sodium chloride flush  10 mL Intravenous 2 times per day    heparin (porcine)  5,000 Units Subcutaneous Q8H    polyethylene glycol  17 g Oral BID    carbidopa-levodopa  2 tablet Oral TID     PRN Meds: LORazepam, traMADol, morphine, sodium chloride flush, promethazine **OR** ondansetron, acetaminophen **OR** acetaminophen, bisacodyl, glucose, dextrose, glucagon (rDNA), dextrose    Labs:     Recent Labs     02/04/21  0549   WBC 7.9   HGB 8.0*   HCT 25.5*          Recent Labs     02/03/21  0254      K 5.0      CO2 22   BUN 19   CREATININE 0.8   CALCIUM 9.0       Recent Labs     02/03/21  0254   PROT 6.6   ALKPHOS 208*   ALT 6   AST 12   BILITOT 0.8       Recent Labs     02/04/21  0549   INR 1.4       No results for input(s): Glory Anand in the last 72 hours. Chronic labs:    Lab Results   Component Value Date    CHOL 117 12/10/2019    TRIG 120 12/10/2019    HDL 44 12/10/2019    LDLCALC 49 12/10/2019    TSH 2.74 06/16/2017    INR 1.4 02/04/2021    LABA1C 6.5 (H) 12/10/2019       Radiology: REVIEWED DAILY    +++++++++++++++++++++++++++++++++++++++++++++++++  [place], [place]  +++++++++++++++++++++++++++++++++++++++++++++++++  NOTE: This report was transcribed using voice recognition software. Every effort was made to ensure accuracy; however, inadvertent computerized transcription errors may be present.

## 2021-02-05 NOTE — INTERVAL H&P NOTE
H&P Update    Patient's History and Physical  was reviewed. The patient appears likely to able to tolerate the procedure. Risk and benefits discussed including ultimate complications, possibly death and consent obtained.     Osmany Salazar, II

## 2021-02-05 NOTE — PROCEDURES
Patient in IR when we attempted to bring her down for her MRI today at 2:00pm. Spoke to floor nurse.

## 2021-02-05 NOTE — INTERVAL H&P NOTE
H&P Update    Patient's History and Physical  was reviewed. The patient appears likely to able to tolerate the procedure. Risk and benefits discussed including ultimate complications, possibly death and consent obtained.     Clarisa Antonio, II

## 2021-02-05 NOTE — BRIEF OP NOTE
Brief Postoperative Note    Sallyanne Holstein  YOB: 1950  [id number]    Pre-operative Diagnosis and Procedure: abscess aspiration and or drainage    Post-operative Diagnosis: Same    Anesthesia: Local    Estimated Blood Loss: < 10 cc    Surgeon: Edwin JAIME     Complications: none    Specimen obtained: yes    Findings: none     Tash Blanca II   2/5/2021 1:40 PM

## 2021-02-05 NOTE — PROGRESS NOTES
Patient arrived   to Radiology department for      Psoas drain         . Allergies, home medications, H&P and fasting instructions reviewed with patient. Vital signs taken. IV placed,  IV flushed and prn adapter attached. Procedural instructions given, questions answered, understanding expressed and consent signed. Patient given fluoroscopy education, no questions at this time  1500 report called to [facility] rn. Specimen to lab.

## 2021-02-05 NOTE — ANESTHESIA POSTPROCEDURE EVALUATION
Department of Anesthesiology  Postprocedure Note    Patient: Flavio Jessica  MRN: [id number]  YOB: 1950  Date of evaluation: 2/5/2021  Time:  2:22 PM     Procedure Summary     Date: 02/05/21 Room / Location: [address] [place] [address]    Anesthesia Start: 9348 Anesthesia Stop: 3275    Procedure: CT GUIDED ABSCESS FLUID DRAIN Diagnosis: (CT guided biopsy of right psoas)    Scheduled Providers: [facility] General Radiologist Responsible Provider: Jeana Bartlett MD    Anesthesia Type: MAC ASA Status: 3          Anesthesia Type: MAC    Ifeoma Phase I:      Ifeoma Phase II:      Last vitals: Reviewed and per EMR flowsheets.        Anesthesia Post Evaluation    Patient location during evaluation: PACU  Patient participation: complete - patient participated  Level of consciousness: awake and alert  Airway patency: patent  Nausea & Vomiting: no nausea and no vomiting  Complications: no  Cardiovascular status: hemodynamically stable and blood pressure returned to baseline  Respiratory status: acceptable  Hydration status: euvolemic

## 2021-02-05 NOTE — CARE COORDINATION
Social Work Discharge/Plannin KRISHNA spoke with liaison Xavier Dale with [facility] who reports patient is a LTC bedhold at [facility]. Per  patient will need 6-8 weeks of Nafcillin. SW notified facility. Patient will need a PICC. Patient's plan is to discharge back to [facility] once medically clear for discharge. KRISHNA/ZACHERY to follow.      ESTHER Victor  [phone]

## 2021-02-05 NOTE — ANESTHESIA PRE PROCEDURE
Department of Anesthesiology  Preprocedure Note       Name:  Salo Carver   Age:  69 y.o.  :  1950                                          MRN:  [id number]         Date:  2021      Surgeon: * No surgeons listed *    Procedure: * No procedures listed *    Medications prior to admission:   Prior to Admission medications    Medication Sig Start Date End Date Taking? Authorizing Provider   hydrOXYzine (VISTARIL) 50 MG capsule Take 50 mg by mouth nightly   Yes Historical Provider, MD   lidocaine (LIDODERM) 5 % Place 1 patch onto the skin daily R hip   Yes Historical Provider, MD   losartan (COZAAR) 100 MG tablet Take 100 mg by mouth daily   Yes Historical Provider, MD   traMADol (ULTRAM) 50 MG tablet Take 50 mg by mouth every 6 hours as needed for Pain. Yes Historical Provider, MD   rOPINIRole (REQUIP) 0.5 MG tablet Take 1 tablet by mouth 3 times daily 20  Yes Veverly Samples, APRN - CNP   carbidopa-levodopa (SINEMET CR)  MG per extended release tablet Take 1 tablet by mouth 3 times daily 20  Yes Veverly Samples, APRN - CNP   insulin glargine (LANTUS SOLOSTAR) 100 UNIT/ML injection pen Inject 80 Units into the skin nightly  Patient taking differently: Inject 20 Units into the skin nightly  4/10/20  Yes Sarah Green MD   amLODIPine (NORVASC) 2.5 MG tablet Take 1 tablet by mouth daily 20  Yes Sarha Green MD   lamoTRIgine (LAMICTAL) 150 MG tablet Take 150 mg by mouth daily  10/18/19  Yes Historical Provider, MD   atorvastatin (LIPITOR) 20 MG tablet Take 1 tablet by mouth daily 10/21/19  Yes Sarah Green MD   insulin aspart (NOVOLOG FLEXPEN) 100 UNIT/ML injection pen INJECT 0-10 UNITS INTO THE SKIN THREE TIMES DAILY(BEFORE MEALS) SLIDING SCALE (MAX DAILY 30 UNITS)  Patient taking differently: Indications: med.  approved 19-20 INJECT 0-10 UNITS INTO THE SKIN THREE TIMES DAILY(BEFORE MEALS) SLIDING SCALE (MAX DAILY 30 UNITS) 19  Yes Harshil SUÁREZ Yvrose Aguila MD   aspirin 81 MG tablet Take 81 mg by mouth nightly   Yes Historical Provider, MD   traZODone (DESYREL) 50 MG tablet Take 150 mg by mouth nightly  3/6/19  Yes Historical Provider, MD   desvenlafaxine (PRISTIQ) 50 MG TB24 Take 150 mg by mouth every morning    Yes Historical Provider, MD   omeprazole (PRILOSEC) 20 MG delayed release capsule Take 20 mg by mouth daily    Historical Provider, MD   potassium chloride 40 MEQ/100ML Infuse 40 mEq intravenously daily    Historical Provider, MD   blood glucose test strips (ACCU-CHEK RIGOBERTO PLUS) strip 1 each by In Vitro route 2 times daily Indications: DISP ACCU-CHEK As needed.     Historical Provider, MD   lisinopril (PRINIVIL;ZESTRIL) 20 MG tablet Take 1 tablet by mouth every morning 4/6/20 1/13/21  Sarah Green MD       Current medications:    Current Facility-Administered Medications   Medication Dose Route Frequency Provider Last Rate Last Admin    LORazepam (ATIVAN) injection 2 mg  2 mg Intravenous Once PRN Jw Gifford MD        nafcillin 2,000 mg in dextrose 5 % 100 mL IVPB (mini-bag)  2,000 mg Intravenous Q4H Bobby Dalton  mL/hr at 02/05/21 1217 2,000 mg at 02/05/21 1217    amLODIPine (NORVASC) tablet 2.5 mg  2.5 mg Oral Daily Jax Umaña MD   2.5 mg at 02/03/21 1021    aspirin chewable tablet 81 mg  81 mg Oral Nightly Jax Umaña MD   Stopped at 02/04/21 2202    atorvastatin (LIPITOR) tablet 20 mg  20 mg Oral Daily Jax Umaña MD   20 mg at 02/04/21 1013    desvenlafaxine succinate (PRISTIQ) extended release tablet 150 mg  150 mg Oral QAM Jax Umaña MD   150 mg at 02/04/21 1012    hydrOXYzine (VISTARIL) capsule 50 mg  50 mg Oral Nightly Jax Umaña MD   50 mg at 02/04/21 2201    insulin lispro (HUMALOG) injection vial 0-6 Units  0-6 Units Subcutaneous TID WC Jax Umaña MD        insulin lispro (HUMALOG) injection vial 0-3 Units  0-3 Units Subcutaneous Nightly Jax Umaña MD   1 Units at 02/04/21 2226    insulin glargine (LANTUS) injection vial 80 Units  80 Units Subcutaneous Nightly Gabriel Cortes MD   80 Units at 02/03/21 2131    lamoTRIgine (LAMICTAL) tablet 150 mg  150 mg Oral Daily Gabriel Cortes MD   150 mg at 02/04/21 1012    lidocaine 4 % external patch 1 patch  1 patch Transdermal Daily Gabriel Cortes MD   1 patch at 02/05/21 0802    losartan (COZAAR) tablet 100 mg  100 mg Oral Daily Gabriel Cortes MD   100 mg at 02/03/21 1029    pantoprazole (PROTONIX) tablet 40 mg  40 mg Oral QAM AC Gabriel Cortes MD   40 mg at 02/05/21 0512    rOPINIRole (REQUIP) tablet 0.5 mg  0.5 mg Oral TID Gabriel Cortes MD   0.5 mg at 02/04/21 2201    traMADol (ULTRAM) tablet 50 mg  50 mg Oral Q6H PRN Gabriel Cortes MD   50 mg at 02/04/21 2242    traZODone (DESYREL) tablet 150 mg  150 mg Oral Nightly Gabriel Cortes MD   150 mg at 02/04/21 2201    morphine (PF) injection 2 mg  2 mg Intravenous Q4H PRN Gabriel Cortes MD   2 mg at 02/05/21 0802    sodium chloride flush 0.9 % injection 10 mL  10 mL Intravenous 2 times per day Gabriel Cortes MD   10 mL at 02/04/21 2203    sodium chloride flush 0.9 % injection 10 mL  10 mL Intravenous PRN Gabriel Cortes MD        promethazine (PHENERGAN) tablet 12.5 mg  12.5 mg Oral Q6H PRN Gabriel Cortes MD        Or    ondansetron (ZOFRAN) injection 4 mg  4 mg Intravenous Q6H PRN Gabriel Cortes MD        acetaminophen (TYLENOL) tablet 650 mg  650 mg Oral Q6H PRN Gabriel Cortes MD        Or    acetaminophen (TYLENOL) suppository 650 mg  650 mg Rectal Q6H PRN Gabriel Cortes MD        heparin (porcine) injection 5,000 Units  5,000 Units Subcutaneous Q8H Gabriel Cortes MD   Stopped at 02/04/21 2203    polyethylene glycol (GLYCOLAX) packet 17 g  17 g Oral BID Gabriel Cortes MD   17 g at 02/04/21 2202    bisacodyl (DULCOLAX) EC tablet 5 mg  5 mg Oral Daily PRN Gabriel Cortes MD        glucose (GLUTOSE) 40 % oral gel 15 g  15 g Oral PRN Gabriel Cortes MD        dextrose 50 % IV solution  12.5 g Intravenous PRN Kiera Godoy MD Jay        glucagon (rDNA) injection 1 mg  1 mg Intramuscular PRN Gabriel Cortes MD        dextrose 5 % solution  100 mL/hr Intravenous PRN Gabriel Cortes MD        0.45 % sodium chloride infusion   Intravenous Continuous Gabriel Cortes MD 75 mL/hr at 02/04/21 0517 New Bag at 02/04/21 0517    carbidopa-levodopa (SINEMET CR)  MG per extended release tablet 2 tablet  2 tablet Oral TID Gabriel Cortes MD   2 tablet at 02/04/21 2201       Allergies:     Allergies   Allergen Reactions    Ciprofloxacin     Codeine Itching       Problem List:    Patient Active Problem List   Diagnosis Code    Depression with anxiety F41.8    Osteoporosis M81.0    Asthma J45.909    Hyperlipidemia E78.5    Mitral regurgitation I34.0    Obstructive sleep apnea syndrome G47.33    Psoriasis L40.9    Type 2 diabetes mellitus with diabetic polyneuropathy, with long-term current use of insulin ([facility]) E11.42, Z79.4    Parkinson disease ([facility] G20    Essential hypertension I10    Microalbuminuria R80.9    Class 3 severe obesity due to excess calories with serious comorbidity and body mass index (BMI) of 40.0 to 44.9 in adult ([facility] E66.01, Z68.41    Right-sided low back pain with right-sided sciatica M54.41    Right leg weakness R29.898    Abscess in epidural space of lumbar spine G06.1    Psoas abscess ([facility]) A07.20       Past Medical History:        Diagnosis Date    Anxiety     Asthma     CAD (coronary artery disease) 1/21/2016    Cancer ([facility]  breast ca 2006    Chronic kidney disease     nephrolithiasis    Depression     Diabetes mellitus ([facility]     H/O mammogram     Hx MRSA infection     toe infection january 2012    Hyperlipidemia     Hypertension     Lateral epicondylitis     SERENA on CPAP     Tubal ligation status        Past Surgical History:        Procedure Laterality Date    BREAST LUMPECTOMY      BREAST REDUCTION SURGERY      CARDIAC CATHETERIZATION  4/28/2014    Dr. Armida Crocker COLONOSCOPY  7/29/15    ENDOSCOPY, COLON, DIAGNOSTIC  7/19/15    GALLBLADDER SURGERY      LUMBAR LAMINECTOMY      TOE AMPUTATION      TONSILLECTOMY      UPPER GASTROINTESTINAL ENDOSCOPY         Social History:    Social History     Tobacco Use    Smoking status: Never Smoker    Smokeless tobacco: Never Used   Substance Use Topics    Alcohol use: No                                Counseling given: Not Answered      Vital Signs (Current):   Vitals:    02/04/21 1100 02/04/21 1215 02/04/21 2000 02/05/21 0730   BP: (!) 96/58 (!) 100/58 (!) 106/52 (!) 92/54   Pulse:  74 75 66   Resp:  20 20 19   Temp:  36.6 °C (97.8 °F) 36.7 °C (98.1 °F) 36.4 °C (97.5 °F)   TempSrc:  Temporal Oral    SpO2:  97% 94% 97%   Weight:       Height:                                                  BP Readings from Last 3 Encounters:   02/05/21 (!) 92/54   10/26/20 (!) 140/80   09/12/20 132/71       NPO Status:                                                                                 BMI:   Wt Readings from Last 3 Encounters:   02/03/21 187 lb 6 oz (85 kg)   10/26/20 198 lb (89.8 kg)   09/12/20 198 lb (89.8 kg)     Body mass index is 36.59 kg/m². CBC:   Lab Results   Component Value Date    WBC 7.9 02/04/2021    RBC 2.95 02/04/2021    HGB 8.0 02/04/2021    HCT 25.5 02/04/2021    MCV 86.4 02/04/2021    RDW 15.0 02/04/2021     02/04/2021       CMP:   Lab Results   Component Value Date     02/03/2021    K 5.0 02/03/2021     02/03/2021    CO2 22 02/03/2021    BUN 19 02/03/2021    CREATININE 0.8 02/03/2021    GFRAA >60 02/03/2021    LABGLOM >60 02/03/2021    GLUCOSE 98 02/03/2021    PROT 6.6 02/03/2021    CALCIUM 9.0 02/03/2021    BILITOT 0.8 02/03/2021    ALKPHOS 208 02/03/2021    AST 12 02/03/2021    ALT 6 02/03/2021       POC Tests: No results for input(s): POCGLU, POCNA, POCK, POCCL, POCBUN, POCHEMO, POCHCT in the last 72 hours.     Coags:   Lab Results   Component Value Date    PROTIME 15.8 02/04/2021    INR 1.4 02/04/2021    APTT 32.2 02/04/2021       HCG (If Applicable): No results found for: PREGTESTUR, PREGSERUM, HCG, HCGQUANT     ABGs: No results found for: PHART, PO2ART, GCR4CQA, HKL5PVK, BEART, [id number]     Type & Screen (If Applicable):  No results found for: LABABO, LABRH    Drug/Infectious Status (If Applicable):  No results found for: HIV, HEPCAB    COVID-19 Screening (If Applicable):   Lab Results   Component Value Date    COVID19 Not Detected 02/04/2021         Anesthesia Evaluation  Patient summary reviewed and Nursing notes reviewed no history of anesthetic complications:   Airway: Mallampati: II  TM distance: <3 FB   Neck ROM: full  Mouth opening: > = 3 FB Dental:    (+) lower dentures      Pulmonary: breath sounds clear to auscultation  (+) sleep apnea: on noncompliant,  asthma:                            Cardiovascular:  Exercise tolerance: good (>4 METS),   (+) hypertension: moderate, hyperlipidemia        Rhythm: regular  Rate: normal           Beta Blocker:  Not on Beta Blocker         Neuro/Psych:   (+) psychiatric history: stable with treatment            GI/Hepatic/Renal: Neg GI/Hepatic/Renal ROS            Endo/Other:    (+) DiabetesType I DM, well controlled, using insulin, . Pt had no PAT visit       Abdominal:           Vascular: negative vascular ROS. Anesthesia Plan      MAC     ASA 3       Induction: intravenous. Anesthetic plan and risks discussed with patient. Plan discussed with attending and resident.                   Aileen Camacho RN   2/5/2021

## 2021-02-05 NOTE — PLAN OF CARE
Problem: Pain:  Goal: Control of acute pain  Description: Control of acute pain  Outcome: Met This Shift  Goal: Control of chronic pain  Description: Control of chronic pain  Outcome: Met This Shift     Problem: Falls - Risk of:  Goal: Absence of physical injury  Description: Absence of physical injury  Outcome: Met This Shift     Problem: Skin Integrity:  Goal: Will show no infection signs and symptoms  Description: Will show no infection signs and symptoms  Outcome: Met This Shift  Goal: Absence of new skin breakdown  Description: Absence of new skin breakdown  Outcome: Met This Shift     Problem: Pain:  Goal: Control of acute pain  Description: Control of acute pain  Outcome: Met This Shift  Goal: Control of chronic pain  Description: Control of chronic pain  Outcome: Met This Shift     Problem: Falls - Risk of:  Goal: Absence of physical injury  Description: Absence of physical injury  Outcome: Met This Shift     Problem: Skin Integrity:  Goal: Will show no infection signs and symptoms  Description: Will show no infection signs and symptoms  Outcome: Met This Shift  Goal: Absence of new skin breakdown  Description: Absence of new skin breakdown  Outcome: Met This Shift

## 2021-02-05 NOTE — CARE COORDINATION
Transition of Care-Met with patient at bed side-discussed at length her treatment plan which included Nafcillin for 6-8 weeks, per Dr. Edwige Moeller. Initially patient wanted to go home at discharge-however her son works and cannot help with the IV antibiotics. She was agreeable to [facility] or [facility]-referral given to [facility]-unable to accept. Reached out to Yuvonne Duane with [facility] as well-reviewing case. [facility] ( [facility]), patient would prefer [facility], however will review list with son when he gets off of work. Discharge pending final antibiotic reconciliation and PICC placement-Dr. Dalton said not until Monday. CM/SW following. Update: Ambulance form & Face sheet/envelope in soft chart. Patient will [facility].      She DUDLEYN, RN  JANELLE   [phone]

## 2021-02-05 NOTE — PLAN OF CARE
Problem: Pain:  Goal: Pain level will decrease  Description: Pain level will decrease  2/5/2021 0030 by Aman Severino RN  Outcome: Met This Shift  2/4/2021 1101 by Moreno Arciniega RN  Outcome: Ongoing  Goal: Control of acute pain  Description: Control of acute pain  2/5/2021 0030 by Aman Severino RN  Outcome: Met This Shift  2/4/2021 1101 by Moreno Arciniega RN  Outcome: Ongoing  Goal: Control of chronic pain  Description: Control of chronic pain  2/5/2021 0030 by Aman Severino RN  Outcome: Met This Shift  2/4/2021 1101 by Moreno Arciniega RN  Outcome: Ongoing

## 2021-02-06 ENCOUNTER — APPOINTMENT (OUTPATIENT)
Dept: MRI IMAGING | Age: 71
DRG: 871 | End: 2021-02-06
Attending: STUDENT IN AN ORGANIZED HEALTH CARE EDUCATION/TRAINING PROGRAM
Payer: MEDICARE

## 2021-02-06 LAB
ANION GAP SERPL CALCULATED.3IONS-SCNC: 12 MMOL/L (ref 7–16)
BASOPHILS ABSOLUTE: 0.03 E9/L (ref 0–0.2)
BASOPHILS RELATIVE PERCENT: 0.4 % (ref 0–2)
BUN BLDV-MCNC: 18 MG/DL (ref 8–23)
CALCIUM SERPL-MCNC: 8.4 MG/DL (ref 8.6–10.2)
CHLORIDE BLD-SCNC: 100 MMOL/L (ref 98–107)
CO2: 22 MMOL/L (ref 22–29)
CREAT SERPL-MCNC: 0.9 MG/DL (ref 0.5–1)
CULTURE, BLOOD 2: ABNORMAL
EOSINOPHILS ABSOLUTE: 0.05 E9/L (ref 0.05–0.5)
EOSINOPHILS RELATIVE PERCENT: 0.7 % (ref 0–6)
GFR AFRICAN AMERICAN: >60
GFR NON-AFRICAN AMERICAN: >60 ML/MIN/1.73
GLUCOSE BLD-MCNC: 74 MG/DL (ref 74–99)
GRAM STAIN ORDERABLE: NORMAL
HCT VFR BLD CALC: 27.2 % (ref 34–48)
HEMOGLOBIN: 8.1 G/DL (ref 11.5–15.5)
IMMATURE GRANULOCYTES #: 0.08 E9/L
IMMATURE GRANULOCYTES %: 1.1 % (ref 0–5)
LYMPHOCYTES ABSOLUTE: 0.98 E9/L (ref 1.5–4)
LYMPHOCYTES RELATIVE PERCENT: 13 % (ref 20–42)
MCH RBC QN AUTO: 26.5 PG (ref 26–35)
MCHC RBC AUTO-ENTMCNC: 29.8 % (ref 32–34.5)
MCV RBC AUTO: 88.9 FL (ref 80–99.9)
METER GLUCOSE: 103 MG/DL (ref 74–99)
METER GLUCOSE: 81 MG/DL (ref 74–99)
METER GLUCOSE: 81 MG/DL (ref 74–99)
METER GLUCOSE: 87 MG/DL (ref 74–99)
MONOCYTES ABSOLUTE: 0.47 E9/L (ref 0.1–0.95)
MONOCYTES RELATIVE PERCENT: 6.3 % (ref 2–12)
NEUTROPHILS ABSOLUTE: 5.91 E9/L (ref 1.8–7.3)
NEUTROPHILS RELATIVE PERCENT: 78.5 % (ref 43–80)
ORGANISM: ABNORMAL
ORGANISM: ABNORMAL
PDW BLD-RTO: 15.8 FL (ref 11.5–15)
PLATELET # BLD: 387 E9/L (ref 130–450)
PMV BLD AUTO: 8.2 FL (ref 7–12)
POTASSIUM SERPL-SCNC: 4.5 MMOL/L (ref 3.5–5)
RBC # BLD: 3.06 E12/L (ref 3.5–5.5)
SODIUM BLD-SCNC: 134 MMOL/L (ref 132–146)
WBC # BLD: 7.5 E9/L (ref 4.5–11.5)

## 2021-02-06 PROCEDURE — 72197 MRI PELVIS W/O & W/DYE: CPT

## 2021-02-06 PROCEDURE — 36569 INSJ PICC 5 YR+ W/O IMAGING: CPT

## 2021-02-06 PROCEDURE — 1200000000 HC SEMI PRIVATE

## 2021-02-06 PROCEDURE — C1751 CATH, INF, PER/CENT/MIDLINE: HCPCS

## 2021-02-06 PROCEDURE — 6360000002 HC RX W HCPCS: Performed by: INTERNAL MEDICINE

## 2021-02-06 PROCEDURE — 36415 COLL VENOUS BLD VENIPUNCTURE: CPT

## 2021-02-06 PROCEDURE — 2500000003 HC RX 250 WO HCPCS: Performed by: INTERNAL MEDICINE

## 2021-02-06 PROCEDURE — 6360000002 HC RX W HCPCS: Performed by: STUDENT IN AN ORGANIZED HEALTH CARE EDUCATION/TRAINING PROGRAM

## 2021-02-06 PROCEDURE — 82962 GLUCOSE BLOOD TEST: CPT

## 2021-02-06 PROCEDURE — 6370000000 HC RX 637 (ALT 250 FOR IP): Performed by: STUDENT IN AN ORGANIZED HEALTH CARE EDUCATION/TRAINING PROGRAM

## 2021-02-06 PROCEDURE — 80048 BASIC METABOLIC PNL TOTAL CA: CPT

## 2021-02-06 PROCEDURE — 2580000003 HC RX 258: Performed by: STUDENT IN AN ORGANIZED HEALTH CARE EDUCATION/TRAINING PROGRAM

## 2021-02-06 PROCEDURE — A9579 GAD-BASE MR CONTRAST NOS,1ML: HCPCS | Performed by: RADIOLOGY

## 2021-02-06 PROCEDURE — 99222 1ST HOSP IP/OBS MODERATE 55: CPT | Performed by: ORTHOPAEDIC SURGERY

## 2021-02-06 PROCEDURE — 85025 COMPLETE CBC W/AUTO DIFF WBC: CPT

## 2021-02-06 PROCEDURE — 2580000003 HC RX 258: Performed by: INTERNAL MEDICINE

## 2021-02-06 PROCEDURE — 76937 US GUIDE VASCULAR ACCESS: CPT

## 2021-02-06 PROCEDURE — 02HV33Z INSERTION OF INFUSION DEVICE INTO SUPERIOR VENA CAVA, PERCUTANEOUS APPROACH: ICD-10-PCS | Performed by: INTERNAL MEDICINE

## 2021-02-06 PROCEDURE — 6360000004 HC RX CONTRAST MEDICATION: Performed by: RADIOLOGY

## 2021-02-06 RX ORDER — LORAZEPAM 2 MG/ML
2 INJECTION INTRAMUSCULAR ONCE
Status: COMPLETED | OUTPATIENT
Start: 2021-02-06 | End: 2021-02-06

## 2021-02-06 RX ORDER — HEPARIN SODIUM (PORCINE) LOCK FLUSH IV SOLN 100 UNIT/ML 100 UNIT/ML
3 SOLUTION INTRAVENOUS EVERY 12 HOURS SCHEDULED
Status: DISCONTINUED | OUTPATIENT
Start: 2021-02-06 | End: 2021-02-10 | Stop reason: HOSPADM

## 2021-02-06 RX ORDER — HEPARIN SODIUM (PORCINE) LOCK FLUSH IV SOLN 100 UNIT/ML 100 UNIT/ML
3 SOLUTION INTRAVENOUS PRN
Status: DISCONTINUED | OUTPATIENT
Start: 2021-02-06 | End: 2021-02-10 | Stop reason: HOSPADM

## 2021-02-06 RX ORDER — LIDOCAINE HYDROCHLORIDE 10 MG/ML
5 INJECTION, SOLUTION EPIDURAL; INFILTRATION; INTRACAUDAL; PERINEURAL ONCE
Status: DISCONTINUED | OUTPATIENT
Start: 2021-02-06 | End: 2021-02-10 | Stop reason: HOSPADM

## 2021-02-06 RX ORDER — SODIUM CHLORIDE 0.9 % (FLUSH) 0.9 %
10 SYRINGE (ML) INJECTION PRN
Status: DISCONTINUED | OUTPATIENT
Start: 2021-02-06 | End: 2021-02-10 | Stop reason: HOSPADM

## 2021-02-06 RX ADMIN — HEPARIN SODIUM 5000 UNITS: 10000 INJECTION INTRAVENOUS; SUBCUTANEOUS at 06:09

## 2021-02-06 RX ADMIN — Medication 10 ML: at 20:44

## 2021-02-06 RX ADMIN — ATORVASTATIN CALCIUM 20 MG: 20 TABLET, FILM COATED ORAL at 08:15

## 2021-02-06 RX ADMIN — NAFCILLIN SODIUM 2000 MG: 2 INJECTION, POWDER, FOR SOLUTION INTRAMUSCULAR; INTRAVENOUS at 00:46

## 2021-02-06 RX ADMIN — ASPIRIN 81 MG: 81 TABLET, CHEWABLE ORAL at 20:43

## 2021-02-06 RX ADMIN — TRAMADOL HYDROCHLORIDE 50 MG: 50 TABLET, FILM COATED ORAL at 03:33

## 2021-02-06 RX ADMIN — HYDROXYZINE PAMOATE 50 MG: 50 CAPSULE ORAL at 20:43

## 2021-02-06 RX ADMIN — ROPINIROLE HYDROCHLORIDE 0.5 MG: 0.5 TABLET, FILM COATED ORAL at 20:49

## 2021-02-06 RX ADMIN — PANTOPRAZOLE SODIUM 40 MG: 40 TABLET, DELAYED RELEASE ORAL at 06:09

## 2021-02-06 RX ADMIN — ROPINIROLE HYDROCHLORIDE 0.5 MG: 0.5 TABLET, FILM COATED ORAL at 08:17

## 2021-02-06 RX ADMIN — POLYETHYLENE GLYCOL 3350 17 G: 17 POWDER, FOR SOLUTION ORAL at 08:15

## 2021-02-06 RX ADMIN — NAFCILLIN SODIUM 2000 MG: 2 INJECTION, POWDER, FOR SOLUTION INTRAMUSCULAR; INTRAVENOUS at 14:27

## 2021-02-06 RX ADMIN — SODIUM CHLORIDE: 4.5 INJECTION, SOLUTION INTRAVENOUS at 00:49

## 2021-02-06 RX ADMIN — ROPINIROLE HYDROCHLORIDE 0.5 MG: 0.5 TABLET, FILM COATED ORAL at 14:29

## 2021-02-06 RX ADMIN — TRAZODONE HYDROCHLORIDE 150 MG: 150 TABLET ORAL at 20:43

## 2021-02-06 RX ADMIN — HEPARIN SODIUM 5000 UNITS: 10000 INJECTION INTRAVENOUS; SUBCUTANEOUS at 14:30

## 2021-02-06 RX ADMIN — NAFCILLIN SODIUM 2000 MG: 2 INJECTION, POWDER, FOR SOLUTION INTRAMUSCULAR; INTRAVENOUS at 16:39

## 2021-02-06 RX ADMIN — MORPHINE SULFATE 2 MG: 2 INJECTION, SOLUTION INTRAMUSCULAR; INTRAVENOUS at 10:17

## 2021-02-06 RX ADMIN — NAFCILLIN SODIUM 2000 MG: 2 INJECTION, POWDER, FOR SOLUTION INTRAMUSCULAR; INTRAVENOUS at 21:47

## 2021-02-06 RX ADMIN — LAMOTRIGINE 150 MG: 100 TABLET ORAL at 08:15

## 2021-02-06 RX ADMIN — SODIUM CHLORIDE, PRESERVATIVE FREE 300 UNITS: 5 INJECTION INTRAVENOUS at 20:44

## 2021-02-06 RX ADMIN — AMLODIPINE BESYLATE 2.5 MG: 2.5 TABLET ORAL at 08:16

## 2021-02-06 RX ADMIN — TRAMADOL HYDROCHLORIDE 50 MG: 50 TABLET, FILM COATED ORAL at 09:10

## 2021-02-06 RX ADMIN — CARBIDOPA AND LEVODOPA 2 TABLET: 25; 100 TABLET, EXTENDED RELEASE ORAL at 14:30

## 2021-02-06 RX ADMIN — CARBIDOPA AND LEVODOPA 2 TABLET: 25; 100 TABLET, EXTENDED RELEASE ORAL at 09:10

## 2021-02-06 RX ADMIN — LORAZEPAM 2 MG: 2 INJECTION INTRAMUSCULAR; INTRAVENOUS at 12:04

## 2021-02-06 RX ADMIN — GADOTERIDOL 17 ML: 279.3 INJECTION, SOLUTION INTRAVENOUS at 14:10

## 2021-02-06 RX ADMIN — HEPARIN SODIUM 5000 UNITS: 10000 INJECTION INTRAVENOUS; SUBCUTANEOUS at 21:20

## 2021-02-06 RX ADMIN — CARBIDOPA AND LEVODOPA 2 TABLET: 25; 100 TABLET, EXTENDED RELEASE ORAL at 21:00

## 2021-02-06 RX ADMIN — NAFCILLIN SODIUM 2000 MG: 2 INJECTION, POWDER, FOR SOLUTION INTRAMUSCULAR; INTRAVENOUS at 08:17

## 2021-02-06 RX ADMIN — DESVENLAFAXINE 150 MG: 50 TABLET, EXTENDED RELEASE ORAL at 08:16

## 2021-02-06 RX ADMIN — MORPHINE SULFATE 2 MG: 2 INJECTION, SOLUTION INTRAMUSCULAR; INTRAVENOUS at 21:47

## 2021-02-06 ASSESSMENT — PAIN SCALES - GENERAL
PAINLEVEL_OUTOF10: 4
PAINLEVEL_OUTOF10: 5
PAINLEVEL_OUTOF10: 6
PAINLEVEL_OUTOF10: 8

## 2021-02-06 NOTE — PROGRESS NOTES
traZODone (DESYREL) tablet 150 mg  150 mg Oral Nightly Katrina Woods MD   150 mg at 02/05/21 2315    morphine (PF) injection 2 mg  2 mg Intravenous Q4H PRN Katrina Woods MD   2 mg at 02/06/21 1017    sodium chloride flush 0.9 % injection 10 mL  10 mL Intravenous 2 times per day Katrina Woods MD   10 mL at 02/04/21 2203    sodium chloride flush 0.9 % injection 10 mL  10 mL Intravenous PRN Katrina Woods MD        promethazine (PHENERGAN) tablet 12.5 mg  12.5 mg Oral Q6H PRN Katrina Woods MD        Or    ondansetron (ZOFRAN) injection 4 mg  4 mg Intravenous Q6H PRN Katrina Woods MD        acetaminophen (TYLENOL) tablet 650 mg  650 mg Oral Q6H PRN Katrina Woods MD        Or    acetaminophen (TYLENOL) suppository 650 mg  650 mg Rectal Q6H PRN Katrina Woods MD        heparin (porcine) injection 5,000 Units  5,000 Units Subcutaneous Q8H Katrina Woods MD   5,000 Units at 02/06/21 0609    polyethylene glycol (GLYCOLAX) packet 17 g  17 g Oral BID Katrina Woods MD   17 g at 02/06/21 0815    bisacodyl (DULCOLAX) EC tablet 5 mg  5 mg Oral Daily PRN Katrina Woods MD        glucose (GLUTOSE) 40 % oral gel 15 g  15 g Oral PRN Katrina Woods MD        dextrose 50 % IV solution  12.5 g Intravenous PRN Katrina Woods MD        glucagon (rDNA) injection 1 mg  1 mg Intramuscular PRN Katrina Woods MD        dextrose 5 % solution  100 mL/hr Intravenous PRN Katrina Woods MD        0.45 % sodium chloride infusion   Intravenous Continuous Katrina Woods MD 75 mL/hr at 02/06/21 0049 New Bag at 02/06/21 0049    carbidopa-levodopa (SINEMET CR)  MG per extended release tablet 2 tablet  2 tablet Oral TID Katrina Woods MD   2 tablet at 02/06/21 0910         REVIEW OF SYSTEMS:    CONSTITUTIONAL:  Denies fever, chill or rigors, nausea or vomiting. HEENT: denies blurring of vision or double vision, denies hearing problem  RESPIRATORY: denies cough, shortness of breath, sputum expectoration, chest pain.   CARDIOVASCULAR:  Denies 02/03/2021    LABALBU 3.1 02/03/2021    CALCIUM 8.4 02/06/2021    BILITOT 0.8 02/03/2021    ALKPHOS 208 02/03/2021    AST 12 02/03/2021    ALT 6 02/03/2021         Hepatic Function Panel:    Lab Results   Component Value Date    ALKPHOS 208 02/03/2021    ALT 6 02/03/2021    AST 12 02/03/2021    PROT 6.6 02/03/2021    BILITOT 0.8 02/03/2021    BILIDIR 0.2 07/20/2013    LABALBU 3.1 02/03/2021       PT/INR:    Lab Results   Component Value Date    PROTIME 15.8 02/04/2021    INR 1.4 02/04/2021       TSH:    Lab Results   Component Value Date    TSH 2.74 06/16/2017       U/A:    Lab Results   Component Value Date    COLORU Yellow 10/26/2020    PHUR 6.0 10/26/2020    WBCUA 2-5 05/29/2019    RBCUA NONE 05/29/2019    RBCUA NONE 03/25/2013    YEAST RARE 10/27/2015    BACTERIA RARE 05/29/2019    CLARITYU Clear 10/26/2020    SPECGRAV 1.015 10/26/2020    LEUKOCYTESUR Negative 10/26/2020    UROBILINOGEN 0.2 10/26/2020    BILIRUBINUR Negative 10/26/2020    BLOODU Negative 10/26/2020    GLUCOSEU Negative 10/26/2020       ABG:  No results found for: Felipe Batty, PHART, THGBART, HXB9NPY, PO2ART, WBN6UKR    MICROBIOLOGY:    Blood culture -    Blood ID, Molecular [[id number]] (Abnormal)    Collected: 02/03/21 0254    Updated: 02/04/21 1012     Bottle Type Aerobic    Source of Blood Culture No site given    Order Number [id number]    Enterobacter cloacae complex by PCR Not Detected    Escherichia coli by PCR Not Detected    Klebsiella oxytoca by PCR Not Detected    Klebsiella pneumoniae group by PCR Not Detected    Proteus species by PCR Not Detected    Streptococcus agalactiae by PCR Not Detected    Staphylococcus aureus by PCR DETECTEDPanic      Serratia marcescens by PCR Not Detected    Streptococcus pneumoniae by PCR Not Detected    Streptococcus pyogenes  by PCR Not Detected    Acinetobacter baumannii by PCR Not Detected    Candida albicans by PCR Not Detected    Candida glabrata by PCR Not Detected    Candida krusei by PCR Not Detected    Candida parapsilosis by PCR Not Detected    Candida tropicalis by PCR Not Detected     Enterobacteriaceae by PCR Not Detected    Enterococcus by PCR Not Detected    Haemophilus Influenzae by PCR Not Detected    Listeria monocytogenes by PCR Not Detected    Neisseria meningitidis by PCR Not Detected    Pseudomonas aeruginosa by PCR Not Detected    Staphylococcus species by PCR DETECTEDPanic      Streptococcus species by PCR Not Detected    Methicillin Resistance mecA/C  by PCR Not Detected   Narrative:     CALL  [email or web address] tel. ,   Microbiology results called to and read back by Orin Banerjee RN, 02/04/2021        Abscess cx -     Specimen: Abscess Updated: 02/06/21 1137    Organism Staphylococcus aureusAbnormal     Culture Surgical --    Moderate growth   Sensitivity to follow    Narrative:             Radiology :   MRI -    Large right iliopsoas intramuscular/retroperitoneal abscess measuring 4.4 x   7.5 x 13.6 cm in size extending to the level of the lower pelvis with   associated surrounding myositis and retroperitoneal infection. Septic arthritis of the right SI joint with osteomyelitis on both sides of   the right SI joint. The findings were sent to the Radiology Results [address] at 3:19   pm on 2/6/2021to be communicated to a licensed caregiver. IMPRESSION:     1. Right psoas abscess s/p CT guided drainage ( cx S aureus ) -   2. MSSA bacteremia -follow up blood cx neg on 2/5     RECOMMENDATIONS:      1. Nafcillin 2 grams IV q 4 hrs   2. PICC line insertion   3. Echo   4.  Neuro surgery reevaluation

## 2021-02-06 NOTE — PROCEDURES
2/5/21 10:25 pm - Notified RN Moshe Nissen that patient, although pre-medicated and very sleepy, is refusing MRI. Pt was barely senior living into the scanner when she freaked out and refused to go in any further. Tried to talk her into trying again but she adamantly refused. Unable to scan pt due to her refusing MRI.

## 2021-02-06 NOTE — CONSULTS
Department of Orthopedic Surgery  Resident Consult Note          Reason for Consult: Evaluation of  right SI joint with concern of osteomyelitis    HISTORY OF PRESENT ILLNESS:       Patient is a 69 y.o. female who presents with chronic right-sided low back pain and right hip pain for over 1 month duration. Patient states she was initially evaluated at outside hospital and transferred to [facility] for definitive management. As part of patient's work-up, an MRI of the sacrum and coccyx was obtained revealing a right psoas abscess and concern for osteomyelitis of the right SI joint. Patient underwent IR aspiration and placement of drain for right psoas abscess. Patient does admit this evening that she is feeling somewhat improved. She does still admit to some pain within her right posterior adryan-pelvis. Of note, patient does have a history of multiple falls and low back pain which has limited her mobility overall. Also has a history significant for breast cancer status post lumpectomy and radiotherapy.     Past Medical History:        Diagnosis Date    Anxiety     Asthma     CAD (coronary artery disease) 1/21/2016    Cancer [facility])  breast ca 2006    Chronic kidney disease     nephrolithiasis    Depression     Diabetes mellitus ([facility] [address]     H/O mammogram     Hx MRSA infection     toe infection january 2012    Hyperlipidemia     Hypertension     Lateral epicondylitis     SERENA on CPAP     Tubal ligation status      Past Surgical History:        Procedure Laterality Date    BREAST LUMPECTOMY      BREAST REDUCTION SURGERY      CARDIAC CATHETERIZATION  4/28/2014    Dr. Anastacio Camp COLONOSCOPY  7/29/15    ENDOSCOPY, COLON, DIAGNOSTIC  7/19/15    GALLBLADDER SURGERY      LUMBAR LAMINECTOMY      TOE AMPUTATION      TONSILLECTOMY      UPPER GASTROINTESTINAL ENDOSCOPY       Current Medications:   Current Facility-Administered Medications: lidocaine PF 1 % injection 5 mL, 5 mL, Intradermal, Once  sodium chloride flush 0.9 % injection 10 mL, 10 mL, Intravenous, PRN  heparin flush 100 UNIT/ML injection 300 Units, 3 mL, Intravenous, 2 times per day  heparin flush 100 UNIT/ML injection 300 Units, 3 mL, Intracatheter, PRN  nafcillin 2,000 mg in dextrose 5 % 100 mL IVPB (mini-bag), 2,000 mg, Intravenous, Q4H  amLODIPine (NORVASC) tablet 2.5 mg, 2.5 mg, Oral, Daily  aspirin chewable tablet 81 mg, 81 mg, Oral, Nightly  atorvastatin (LIPITOR) tablet 20 mg, 20 mg, Oral, Daily  desvenlafaxine succinate (PRISTIQ) extended release tablet 150 mg, 150 mg, Oral, QAM  hydrOXYzine (VISTARIL) capsule 50 mg, 50 mg, Oral, Nightly  insulin lispro (HUMALOG) injection vial 0-6 Units, 0-6 Units, Subcutaneous, TID WC  insulin lispro (HUMALOG) injection vial 0-3 Units, 0-3 Units, Subcutaneous, Nightly  insulin glargine (LANTUS) injection vial 80 Units, 80 Units, Subcutaneous, Nightly  lamoTRIgine (LAMICTAL) tablet 150 mg, 150 mg, Oral, Daily  lidocaine 4 % external patch 1 patch, 1 patch, Transdermal, Daily  losartan (COZAAR) tablet 100 mg, 100 mg, Oral, Daily  pantoprazole (PROTONIX) tablet 40 mg, 40 mg, Oral, QAM AC  rOPINIRole (REQUIP) tablet 0.5 mg, 0.5 mg, Oral, TID  traMADol (ULTRAM) tablet 50 mg, 50 mg, Oral, Q6H PRN  traZODone (DESYREL) tablet 150 mg, 150 mg, Oral, Nightly  morphine (PF) injection 2 mg, 2 mg, Intravenous, Q4H PRN  sodium chloride flush 0.9 % injection 10 mL, 10 mL, Intravenous, 2 times per day  sodium chloride flush 0.9 % injection 10 mL, 10 mL, Intravenous, PRN  promethazine (PHENERGAN) tablet 12.5 mg, 12.5 mg, Oral, Q6H PRN **OR** ondansetron (ZOFRAN) injection 4 mg, 4 mg, Intravenous, Q6H PRN  acetaminophen (TYLENOL) tablet 650 mg, 650 mg, Oral, Q6H PRN **OR** acetaminophen (TYLENOL) suppository 650 mg, 650 mg, Rectal, Q6H PRN  heparin (porcine) injection 5,000 Units, 5,000 Units, Subcutaneous, Q8H  polyethylene glycol (GLYCOLAX) packet 17 g, 17 g, Oral, BID  bisacodyl (DULCOLAX) EC noted within the anterior lower right hemipelvis  Compartments soft and compressible  Tenderness to palpation over the right sacroiliac joint  No pain with attempted logroll  Somewhat limited active and passive range of motion about the right knee and hip joint  +PF/DF/EHL  +2/4 DP & PT pulses, Brisk Cap refill, Toes warm and perfused  Distal sensation grossly intact to Peroneals, Sural, Saphenous, and tibial nrs      DATA:    CBC:   Lab Results   Component Value Date    WBC 7.5 02/06/2021    RBC 3.06 02/06/2021    HGB 8.1 02/06/2021    HCT 27.2 02/06/2021    MCV 88.9 02/06/2021    MCH 26.5 02/06/2021    MCHC 29.8 02/06/2021    RDW 15.8 02/06/2021     02/06/2021    MPV 8.2 02/06/2021     PT/INR:    Lab Results   Component Value Date    PROTIME 15.8 02/04/2021    INR 1.4 02/04/2021       Radiology Review:  MRI sacrum/coccyx: Significant right-sided iliopsoas abscess noted within the hemipelvis. Diffuse edematous changes noted within this region as well. Erosive bony changes noted about the right sacroiliac joint. No obvious fractures or dislocations    IMPRESSION:  · Right-sided iliopsoas abscess  · Right sacroiliac joint bony changes suggestive of osteomyelitis/infection    PLAN:  · Patient presents for evaluation of right hemipelvis iliopsoas abscess and right sacroiliac joint infectious indications as indicated on MRI of sacrum/coccyx. · Results of interventional radiology aspiration yielded surgical cultures of staph aureus  · Recommend continued IV antibiotics per infectious disease   · Continue drain and management as indicated  · No acute orthopedic surgical intervention planned  · Orthopedic service will sign off.   Please contact with questions or concerns  · Discussed with Dr. Alan Valentin

## 2021-02-06 NOTE — PROGRESS NOTES
Hospitalist Progress Note      SYNOPSIS: Patient admitted on 2/3/2021 as a transfer from [facility] had presented there with a complaint of low back pain and MRI of the lumbar spine done showed a right sacral iliac joint infection and cellulitis as well as abscess formation of the right psoas muscle and moderate to severe lumbar foraminal stenosis.  She did not have any saddle anesthesia, bladder or bowel incontinence. Liane Maldonado was transferred to [facility] in the early hours of 2/3/2021 for neurosurgery evaluation. Neurosurgery evaluated her and advocated for nonsurgical management. ID consulted. She was started on nafcillin, and had right psoas abscess drainage on 2021/ Blood cultures are growing Staph aureus.         SUBJECTIVE:    Patient seen and examined. She was to have MRI of the sacrum and coccyx today, but she refused this morning. She had no complaints. She is agreeable to having MRI after I spoke to her. Records reviewed. Temp (24hrs), Av °F (36.7 °C), Min:97.8 °F (36.6 °C), Max:98.2 °F (36.8 °C)    DIET: Diet NPO, After Midnight Exceptions are: Sips of Water with Meds  CODE: Full Code    Intake/Output Summary (Last 24 hours) at 2021 1323  Last data filed at 2021 0719  Gross per 24 hour   Intake 440 ml   Output 1000 ml   Net -560 ml       OBJECTIVE:    /65   Pulse 83   Temp 98 °F (36.7 °C) (Temporal)   Resp 16   Ht 5' (1.524 m)   Wt 187 lb 6 oz (85 kg)   SpO2 94%   BMI 36.59 kg/m²     General appearance: No apparent distress, appears stated age and cooperative, morbidly obese  HEENT:  Conjunctivae/corneas clear. Neck: Supple. No jugular venous distention.    Respiratory: Clear to auscultation bilaterally, normal respiratory effort  Cardiovascular: Regular rate rhythm, normal S1-S2  Abdomen: Soft, nontender, nondistended, mild left lower quadrant pain, has drain in right lower quadrant  Musculoskeletal: No clubbing, cyanosis, no bilateral mg Oral TID    traZODone  150 mg Oral Nightly    sodium chloride flush  10 mL Intravenous 2 times per day    heparin (porcine)  5,000 Units Subcutaneous Q8H    polyethylene glycol  17 g Oral BID    carbidopa-levodopa  2 tablet Oral TID     PRN Meds: traMADol, morphine, sodium chloride flush, promethazine **OR** ondansetron, acetaminophen **OR** acetaminophen, bisacodyl, glucose, dextrose, glucagon (rDNA), dextrose    Labs:     Recent Labs     02/04/21  0549 02/05/21  1710 02/06/21  0726   WBC 7.9 4.8 7.5   HGB 8.0* 7.3* 8.1*   HCT 25.5* 24.3* 27.2*    317 387       Recent Labs     02/05/21  1710 02/06/21  0726   * 134   K 4.2 4.5   CL 97* 100   CO2 19* 22   BUN 18 18   CREATININE 1.0 0.9   CALCIUM 8.3* 8.4*       No results for input(s): PROT, ALB, ALKPHOS, ALT, AST, BILITOT, AMYLASE, LIPASE in the last 72 hours. Recent Labs     02/04/21  0549   INR 1.4       No results for input(s): August Mckeon in the last 72 hours. Chronic labs:    Lab Results   Component Value Date    CHOL 117 12/10/2019    TRIG 120 12/10/2019    HDL 44 12/10/2019    LDLCALC 49 12/10/2019    TSH 2.74 06/16/2017    INR 1.4 02/04/2021    LABA1C 6.5 (H) 12/10/2019       Radiology: REVIEWED DAILY    +++++++++++++++++++++++++++++++++++++++++++++++++  [place], [place]  +++++++++++++++++++++++++++++++++++++++++++++++++  NOTE: This report was transcribed using voice recognition software. Every effort was made to ensure accuracy; however, inadvertent computerized transcription errors may be present.

## 2021-02-06 NOTE — PROGRESS NOTES
Neurosurgery Attending  MRI of sacrum/pelvis reviewed. Patient with SI joint infection and retroperitoneal infection.   Still no spinal infection will consult General surgery for retroperitoneal infection and ortho for SI joint infection    Rayshawn Stevenson

## 2021-02-06 NOTE — PROCEDURES
Dr Bautista Goncalves notified this patient is adamantly refusing MRI and \"freaking out\" even after successful premedication and sleepy upon arrival to MRI

## 2021-02-06 NOTE — CONSULTS
medications    Medication Sig Start Date End Date Taking? Authorizing Provider   hydrOXYzine (VISTARIL) 50 MG capsule Take 50 mg by mouth nightly   Yes Historical Provider, MD   lidocaine (LIDODERM) 5 % Place 1 patch onto the skin daily R hip   Yes Historical Provider, MD   losartan (COZAAR) 100 MG tablet Take 100 mg by mouth daily   Yes Historical Provider, MD   traMADol (ULTRAM) 50 MG tablet Take 50 mg by mouth every 6 hours as needed for Pain. Yes Historical Provider, MD   rOPINIRole (REQUIP) 0.5 MG tablet Take 1 tablet by mouth 3 times daily 12/28/20  Yes RASHARD Wood CNP   carbidopa-levodopa (SINEMET CR)  MG per extended release tablet Take 1 tablet by mouth 3 times daily 12/28/20  Yes RASHARD Wood CNP   insulin glargine (LANTUS SOLOSTAR) 100 UNIT/ML injection pen Inject 80 Units into the skin nightly  Patient taking differently: Inject 20 Units into the skin nightly  4/10/20  Yes Alan Vaughn MD   amLODIPine (NORVASC) 2.5 MG tablet Take 1 tablet by mouth daily 4/6/20  Yes Alan Vaughn MD   lamoTRIgine (LAMICTAL) 150 MG tablet Take 150 mg by mouth daily  10/18/19  Yes Historical Provider, MD   atorvastatin (LIPITOR) 20 MG tablet Take 1 tablet by mouth daily 10/21/19  Yes Alan Vaughn MD   insulin aspart (NOVOLOG FLEXPEN) 100 UNIT/ML injection pen INJECT 0-10 UNITS INTO THE SKIN THREE TIMES DAILY(BEFORE MEALS) SLIDING SCALE (MAX DAILY 30 UNITS)  Patient taking differently: Indications: med.  approved 6/1/19-7/29/20 INJECT 0-10 UNITS INTO THE SKIN THREE TIMES DAILY(BEFORE MEALS) SLIDING SCALE (MAX DAILY 30 UNITS) 7/29/19  Yes Alan Vaughn MD   aspirin 81 MG tablet Take 81 mg by mouth nightly   Yes Historical Provider, MD   traZODone (DESYREL) 50 MG tablet Take 150 mg by mouth nightly  3/6/19  Yes Historical Provider, MD   desvenlafaxine (PRISTIQ) 50 MG TB24 Take 150 mg by mouth every morning    Yes Historical Provider, MD   omeprazole (PRILOSEC) 20 MG delayed release capsule Take 20 mg by mouth daily    Historical Provider, MD   potassium chloride 40 MEQ/100ML Infuse 40 mEq intravenously daily    Historical Provider, MD   blood glucose test strips (ACCU-CHEK RIGOBERTO PLUS) strip 1 each by In Vitro route 2 times daily Indications: DISP ACCU-CHEK As needed. Historical Provider, MD   lisinopril (PRINIVIL;ZESTRIL) 20 MG tablet Take 1 tablet by mouth every morning 4/6/20 1/13/21  April Tenorio MD       Allergies   Allergen Reactions    Ciprofloxacin     Codeine Itching       Family History   Problem Relation Age of Onset    Cancer Mother         Lung Ca    Cancer Father         lung Ca    Diabetes Sister     Heart Disease Sister     Seizures Son     Other Brother         sepsis       Social History     Tobacco Use    Smoking status: Never Smoker    Smokeless tobacco: Never Used   Substance Use Topics    Alcohol use: No    Drug use: No         Review of Systems:   General ROS: fatigue, sleep from sedation for MRI  Hematological and Lymphatic ROS: hx R breast cancer  Respiratory ROS: no cough, shortness of breath, or wheezing  Cardiovascular ROS: no chest pain or dyspnea on exertion  Gastrointestinal ROS: no abdominal pain, change in bowel habits, or black or bloody stools  Genito-Urinary ROS: no dysuria, trouble voiding, or hematuria  Musculoskeletal ROS: R hip pain, R lower back pain      PHYSICAL EXAM:    Vitals:    02/06/21 0715   BP: 117/65   Pulse: 83   Resp: 16   Temp: 98 °F (36.7 °C)   SpO2: 94%       GENERAL:  NAD. A&Ox3. HEAD:  Normocephalic. Atraumatic. EYES:   No scleral icterus. PERRL. LUNGS:  No increased work of breathing. CARDIOVASCULAR: Regular rate  ABDOMEN:  Soft, non-distended, non-tender. No guarding, rigidity, rebound. BACK:  No spinal TTP. EXTREMITIES:   MAEx4. Atraumatic. No LE edema. Pain in back and R hip on R hip flexion (+psoas sign)  SKIN:  Warm and dry. No erythema or induration around drain.   No rashes or wounds. LABS:    CBC  Recent Labs     21  0726   WBC 7.5   HGB 8.1*   HCT 27.2*        BMP  Recent Labs     21  0726      K 4.5      CO2 22   BUN 18   CREATININE 0.9   CALCIUM 8.4*     Liver Function  No results for input(s): AMYLASE, LIPASE, BILITOT, BILIDIR, AST, ALT, ALKPHOS, PROT, LABALBU in the last 72 hours. No results for input(s): LACTATE in the last 72 hours. Recent Labs     21  0549   INR 1.4       RADIOLOGY    Ct Abdomen Pelvis W Wo Contrast Additional Contrast? None    Result Date: 2021  Patient MRN:  [id number] : 1950 Age: 69 years Gender: Female Order Date:  2021 12:53 PM EXAM: CT ABDOMEN PELVIS W WO CONTRAST NUMBER OF IMAGES \ views:  018 INDICATION: M62.89 Psoas mass PLEASE ASSIGN TO DR Freda Koyanagi TO READ Psoas mass COMPARISON: 21 Technique: Low-dose CT  acquisition technique included one of following options; 1 . Automated exposure control, 2. Adjustment of MA and or KV according to patient's size or 3. Use of iterative reconstruction. Multiple computerized tomography sections of the abdomen with sagittal and coronal MPR reconstructions were obtained from the top of the diaphragm to the pelvis. The visualized portions of the abdomen reveal: The patient is status post cholecystectomy There is evidence for hepatomegaly The spleen appears to be enlarged There appear to be multiple varices There is a curvilinear calcification likely representing a tortuous splenic artery There is a complex cystic mass involving the iliopsoas muscle likely an abscess There is evidence for prior surgery    Probable right iliopsoas abscess         ASSESSMENT/PLAN:  69 y.o. female with R psoas abscess s/p IR drain on ;  MSSA bacteremia    Overall care per Primary  ID following, abx per ID  S/p CT guided drainage of R psoas abscess - continue drain. Management per IR. Ortho consulted for R sacroiliitis and possible osteo seen on MRI.  Awaiting recs  [facility] following, recs [place] and Ortho consults  As long as clinically improving with abx and drain, no indications for surgery from Gen Surg perspective. Recommend outpatient follow up with Surgery after drain removal to schedule colonoscopy. Plan will be discussed with Dr. Kb Acosta.     Phong Stevenson,   Resident, PGY-2  2/6/2021  5:34 PM

## 2021-02-06 NOTE — PROGRESS NOTES
Vascular Access Procedure Note    Procedure Date:   2/6/2021    Pre-procedure Verification/Time-Out:  The proposed risks versus benefits of this procedure were discussed in detail by the physician with patient. written consent was obtained from the patient. Relevant documentation was reviewed prior to procedure including signed consent form and medications. All necessary equipment for procedure is available at time of procedure yes. An audible time out was done at 1555PM by team members, correctly identifying patients name, medical record number, correct side, correct site, and correct procedure to be performed with registered nurse members of the procedure team all in agreement.         Indication for Procedure:   Reason for Insertion: intravenous antibiotics    ASA Assessment (Required for Moderate & Deep Sedation):      Procedure:   Reason for Consultation: power PICC line    Clinician Performing Procedure:   Mak Yañez rn    Assistant:  none    Sedation:   Analgesia Used: lidocaine 1%    Procedure Details/Findings:  Catheter Tunisian Size: 5 fr dl  Lot Number: [id number]  Product #: [id number]  Expiration Date: 01/31/2022  Maximum Barrier Precautions: cap, eye shield, full body drape, gloves, gown, handwashing and mask  Skin Prep: chlorhexidine  Technique: modified seldinger and ultrasound guided with VPS  Attempts: 1  Exposed (cm): 2  Total (cm): 38  Placement Site: left basilic vein  Vessel Size: 0.55  Dressing: securement device, transparent dressing and biopatch  Blood Return: Yes   Ultrasound Guidance: Yes   Arm Circumference Mid-Bicep (cm): 25  Chest X-Ray Ordered: VasoNova VPS  End Placement: caj    Complications:   none     Post-operative Condition:  stable  Patient Tolerated Procedure: well     Comments/Post-operative Education:   Post Procedure Interventions: no blood pressure sign placed above bed    Jameson Million  02/06/21  4:22 PM

## 2021-02-07 LAB
ANAEROBIC CULTURE: NORMAL
ANION GAP SERPL CALCULATED.3IONS-SCNC: 10 MMOL/L (ref 7–16)
BASOPHILS ABSOLUTE: 0.02 E9/L (ref 0–0.2)
BASOPHILS RELATIVE PERCENT: 0.3 % (ref 0–2)
BUN BLDV-MCNC: 16 MG/DL (ref 8–23)
CALCIUM SERPL-MCNC: 8.5 MG/DL (ref 8.6–10.2)
CHLORIDE BLD-SCNC: 103 MMOL/L (ref 98–107)
CO2: 21 MMOL/L (ref 22–29)
CREAT SERPL-MCNC: 0.8 MG/DL (ref 0.5–1)
CULTURE SURGICAL: ABNORMAL
EOSINOPHILS ABSOLUTE: 0.05 E9/L (ref 0.05–0.5)
EOSINOPHILS RELATIVE PERCENT: 0.7 % (ref 0–6)
GFR AFRICAN AMERICAN: >60
GFR NON-AFRICAN AMERICAN: >60 ML/MIN/1.73
GLUCOSE BLD-MCNC: 104 MG/DL (ref 74–99)
HCT VFR BLD CALC: 24 % (ref 34–48)
HEMOGLOBIN: 7.6 G/DL (ref 11.5–15.5)
IMMATURE GRANULOCYTES #: 0.05 E9/L
IMMATURE GRANULOCYTES %: 0.7 % (ref 0–5)
LYMPHOCYTES ABSOLUTE: 1.05 E9/L (ref 1.5–4)
LYMPHOCYTES RELATIVE PERCENT: 15.5 % (ref 20–42)
MCH RBC QN AUTO: 27.4 PG (ref 26–35)
MCHC RBC AUTO-ENTMCNC: 31.7 % (ref 32–34.5)
MCV RBC AUTO: 86.6 FL (ref 80–99.9)
METER GLUCOSE: 128 MG/DL (ref 74–99)
METER GLUCOSE: 140 MG/DL (ref 74–99)
METER GLUCOSE: 149 MG/DL (ref 74–99)
METER GLUCOSE: 155 MG/DL (ref 74–99)
MONOCYTES ABSOLUTE: 0.53 E9/L (ref 0.1–0.95)
MONOCYTES RELATIVE PERCENT: 7.8 % (ref 2–12)
NEUTROPHILS ABSOLUTE: 5.06 E9/L (ref 1.8–7.3)
NEUTROPHILS RELATIVE PERCENT: 75 % (ref 43–80)
ORGANISM: ABNORMAL
PDW BLD-RTO: 15.6 FL (ref 11.5–15)
PLATELET # BLD: 380 E9/L (ref 130–450)
PMV BLD AUTO: 8.5 FL (ref 7–12)
POTASSIUM SERPL-SCNC: 4.4 MMOL/L (ref 3.5–5)
RBC # BLD: 2.77 E12/L (ref 3.5–5.5)
SODIUM BLD-SCNC: 134 MMOL/L (ref 132–146)
WBC # BLD: 6.8 E9/L (ref 4.5–11.5)

## 2021-02-07 PROCEDURE — 6370000000 HC RX 637 (ALT 250 FOR IP): Performed by: STUDENT IN AN ORGANIZED HEALTH CARE EDUCATION/TRAINING PROGRAM

## 2021-02-07 PROCEDURE — 6360000002 HC RX W HCPCS: Performed by: STUDENT IN AN ORGANIZED HEALTH CARE EDUCATION/TRAINING PROGRAM

## 2021-02-07 PROCEDURE — 1200000000 HC SEMI PRIVATE

## 2021-02-07 PROCEDURE — 80048 BASIC METABOLIC PNL TOTAL CA: CPT

## 2021-02-07 PROCEDURE — 2500000003 HC RX 250 WO HCPCS: Performed by: INTERNAL MEDICINE

## 2021-02-07 PROCEDURE — 36415 COLL VENOUS BLD VENIPUNCTURE: CPT

## 2021-02-07 PROCEDURE — 93308 TTE F-UP OR LMTD: CPT

## 2021-02-07 PROCEDURE — 82962 GLUCOSE BLOOD TEST: CPT

## 2021-02-07 PROCEDURE — 2580000003 HC RX 258: Performed by: STUDENT IN AN ORGANIZED HEALTH CARE EDUCATION/TRAINING PROGRAM

## 2021-02-07 PROCEDURE — 85025 COMPLETE CBC W/AUTO DIFF WBC: CPT

## 2021-02-07 PROCEDURE — 6360000002 HC RX W HCPCS: Performed by: INTERNAL MEDICINE

## 2021-02-07 PROCEDURE — 2580000003 HC RX 258: Performed by: INTERNAL MEDICINE

## 2021-02-07 RX ADMIN — NAFCILLIN SODIUM 2000 MG: 2 INJECTION, POWDER, FOR SOLUTION INTRAMUSCULAR; INTRAVENOUS at 16:26

## 2021-02-07 RX ADMIN — SODIUM CHLORIDE, PRESERVATIVE FREE 300 UNITS: 5 INJECTION INTRAVENOUS at 20:43

## 2021-02-07 RX ADMIN — LAMOTRIGINE 150 MG: 100 TABLET ORAL at 09:06

## 2021-02-07 RX ADMIN — HEPARIN SODIUM 5000 UNITS: 10000 INJECTION INTRAVENOUS; SUBCUTANEOUS at 20:42

## 2021-02-07 RX ADMIN — INSULIN LISPRO 1 UNITS: 100 INJECTION, SOLUTION INTRAVENOUS; SUBCUTANEOUS at 20:44

## 2021-02-07 RX ADMIN — NAFCILLIN SODIUM 2000 MG: 2 INJECTION, POWDER, FOR SOLUTION INTRAMUSCULAR; INTRAVENOUS at 05:11

## 2021-02-07 RX ADMIN — MORPHINE SULFATE 2 MG: 2 INJECTION, SOLUTION INTRAMUSCULAR; INTRAVENOUS at 08:01

## 2021-02-07 RX ADMIN — Medication 10 ML: at 20:44

## 2021-02-07 RX ADMIN — TRAZODONE HYDROCHLORIDE 150 MG: 150 TABLET ORAL at 20:43

## 2021-02-07 RX ADMIN — PANTOPRAZOLE SODIUM 40 MG: 40 TABLET, DELAYED RELEASE ORAL at 05:14

## 2021-02-07 RX ADMIN — NAFCILLIN SODIUM 2000 MG: 2 INJECTION, POWDER, FOR SOLUTION INTRAMUSCULAR; INTRAVENOUS at 13:05

## 2021-02-07 RX ADMIN — HEPARIN SODIUM 5000 UNITS: 10000 INJECTION INTRAVENOUS; SUBCUTANEOUS at 05:28

## 2021-02-07 RX ADMIN — HYDROXYZINE PAMOATE 50 MG: 50 CAPSULE ORAL at 20:43

## 2021-02-07 RX ADMIN — NAFCILLIN SODIUM 2000 MG: 2 INJECTION, POWDER, FOR SOLUTION INTRAMUSCULAR; INTRAVENOUS at 09:04

## 2021-02-07 RX ADMIN — CARBIDOPA AND LEVODOPA 2 TABLET: 25; 100 TABLET, EXTENDED RELEASE ORAL at 09:06

## 2021-02-07 RX ADMIN — NAFCILLIN SODIUM 2000 MG: 2 INJECTION, POWDER, FOR SOLUTION INTRAMUSCULAR; INTRAVENOUS at 20:43

## 2021-02-07 RX ADMIN — INSULIN GLARGINE 80 UNITS: 100 INJECTION, SOLUTION SUBCUTANEOUS at 20:44

## 2021-02-07 RX ADMIN — NAFCILLIN SODIUM 2000 MG: 2 INJECTION, POWDER, FOR SOLUTION INTRAMUSCULAR; INTRAVENOUS at 01:23

## 2021-02-07 RX ADMIN — Medication 10 ML: at 09:06

## 2021-02-07 RX ADMIN — ASPIRIN 81 MG: 81 TABLET, CHEWABLE ORAL at 20:43

## 2021-02-07 RX ADMIN — HEPARIN SODIUM 5000 UNITS: 10000 INJECTION INTRAVENOUS; SUBCUTANEOUS at 13:11

## 2021-02-07 RX ADMIN — DESVENLAFAXINE 150 MG: 50 TABLET, EXTENDED RELEASE ORAL at 09:06

## 2021-02-07 RX ADMIN — ROPINIROLE HYDROCHLORIDE 0.5 MG: 0.5 TABLET, FILM COATED ORAL at 13:11

## 2021-02-07 RX ADMIN — CARBIDOPA AND LEVODOPA 2 TABLET: 25; 100 TABLET, EXTENDED RELEASE ORAL at 20:43

## 2021-02-07 RX ADMIN — CARBIDOPA AND LEVODOPA 2 TABLET: 25; 100 TABLET, EXTENDED RELEASE ORAL at 13:11

## 2021-02-07 RX ADMIN — ROPINIROLE HYDROCHLORIDE 0.5 MG: 0.5 TABLET, FILM COATED ORAL at 09:06

## 2021-02-07 RX ADMIN — MORPHINE SULFATE 2 MG: 2 INJECTION, SOLUTION INTRAMUSCULAR; INTRAVENOUS at 20:42

## 2021-02-07 RX ADMIN — TRAMADOL HYDROCHLORIDE 50 MG: 50 TABLET, FILM COATED ORAL at 14:13

## 2021-02-07 RX ADMIN — ATORVASTATIN CALCIUM 20 MG: 20 TABLET, FILM COATED ORAL at 09:06

## 2021-02-07 RX ADMIN — INSULIN LISPRO 1 UNITS: 100 INJECTION, SOLUTION INTRAVENOUS; SUBCUTANEOUS at 16:27

## 2021-02-07 RX ADMIN — INSULIN LISPRO 1 UNITS: 100 INJECTION, SOLUTION INTRAVENOUS; SUBCUTANEOUS at 09:08

## 2021-02-07 RX ADMIN — SODIUM CHLORIDE, PRESERVATIVE FREE 300 UNITS: 5 INJECTION INTRAVENOUS at 09:07

## 2021-02-07 RX ADMIN — ROPINIROLE HYDROCHLORIDE 0.5 MG: 0.5 TABLET, FILM COATED ORAL at 20:43

## 2021-02-07 RX ADMIN — POLYETHYLENE GLYCOL 3350 17 G: 17 POWDER, FOR SOLUTION ORAL at 20:42

## 2021-02-07 ASSESSMENT — PAIN DESCRIPTION - ORIENTATION: ORIENTATION: RIGHT

## 2021-02-07 ASSESSMENT — PAIN SCALES - GENERAL
PAINLEVEL_OUTOF10: 10
PAINLEVEL_OUTOF10: 10

## 2021-02-07 ASSESSMENT — PAIN DESCRIPTION - PAIN TYPE: TYPE: CHRONIC PAIN

## 2021-02-07 ASSESSMENT — PAIN DESCRIPTION - DESCRIPTORS: DESCRIPTORS: ACHING;CONSTANT

## 2021-02-07 ASSESSMENT — PAIN - FUNCTIONAL ASSESSMENT: PAIN_FUNCTIONAL_ASSESSMENT: PREVENTS OR INTERFERES SOME ACTIVE ACTIVITIES AND ADLS

## 2021-02-07 NOTE — PROGRESS NOTES
Hospitalist Progress Note      SYNOPSIS: Patient admitted on 2/3/2021 as a transfer from [facility] had presented there with a complaint of low back pain and MRI of the lumbar spine done showed a right sacral iliac joint infection and cellulitis as well as abscess formation of the right psoas muscle and moderate to severe lumbar foraminal stenosis.  She did not have any saddle anesthesia, bladder or bowel incontinence. Vera Dunn was transferred to [facility] in the early hours of 2/3/2021 for neurosurgery evaluation. Neurosurgery evaluated her and advocated for nonsurgical management. ID consulted. She was started on nafcillin, and had right psoas abscess drainage on 2021/ Blood cultures are growing Staph aureus.         SUBJECTIVE:  Patient seen and examined. She was to have MRI of the sacrum and coccyx today, but she refused this morning. She had no complaints. She ultimately did have the MRI which shows a large right iliopsoas intramuscular retroperitoneal abscess as well as septic arthritis of the right SI joint osteomyelitis of both sides of the right SI joint  Both general and orthopedic surgery have seen the patient and the plan is for continued antibiotics and drainage  Infectious disease continues with nafcillin 2 g IV every 4 hours    Temp (24hrs), Av.5 °F (36.9 °C), Min:97.9 °F (36.6 °C), Max:99 °F (37.2 °C)    DIET: DIET CARB CONTROL; Carb Control: 3 carb choices (45 gms)/meal  CODE: Full Code  No intake or output data in the 24 hours ending 21 1058    OBJECTIVE:    BP (!) 106/53   Pulse 99   Temp 99 °F (37.2 °C) (Temporal)   Resp 18   Ht 5' (1.524 m)   Wt 187 lb 6 oz (85 kg)   SpO2 95%   BMI 36.59 kg/m²     General appearance: No apparent distress, appears stated age and cooperative, morbidly obese  HEENT:  Conjunctivae/corneas clear. Neck: Supple. No jugular venous distention.    Respiratory: Clear to auscultation bilaterally, normal respiratory effort  Cardiovascular: Regular rate rhythm, normal S1-S2  Abdomen: Soft, nontender, nondistended, mild left lower quadrant pain, has drain in right lower quadrant  Musculoskeletal: No clubbing, cyanosis, no bilateral lower extremity edema. Brisk capillary refill. Skin:  No rashes  on visible skin  Neurologic: awake, alert and following commands     ASSESSMENT/PLAN  #Right psoas muscle abscess and cellulitis, with sacroiliac joint infection  -neurosurgery on board  -MRI of lumbar spine showed finding suspicious for infection of the right sacroiliac joint, with cellulitis and abscesses in the anterior tissue, including in the right psoas muscle; neural foraminal stenosis, worse at the left L4-5 and right L5-S1 levels  -neurosurgery advocates conservative management for now  -had right psoas abscess drainage yesterday; fluid growing Staph aureus  -ID on board  -blood cultures growing Staph aureus  -on IV nafcillin  --for MRI of the sacrum and coccyx  Today  -Repeat blood cultures are negative. -   #Lumbar neural foraminal stenosis  -neurosurgery on board; advocate conservative management  -PT/OT consult  -fall precautions     #Hypertension: continue amlodipine and losartan     #Hyperlipidemia: on statin     #Type 2 diabetes mellitus;  -Continue lantus 80 units qhs.  - Insulin sliding scale. Accuchecks ACHS        #Anemia:  - stable.  Hb is pending today.   -transfuse if Hb <7      DVT prophylaxis:  -heparin.             DISPOSITION: home once medically stable    Medications:  REVIEWED DAILY    Infusion Medications    dextrose      sodium chloride 75 mL/hr at 02/06/21 0049     Scheduled Medications    lidocaine PF  5 mL Intradermal Once    heparin flush  3 mL Intravenous 2 times per day    nafcillin  2,000 mg Intravenous Q4H    amLODIPine  2.5 mg Oral Daily    aspirin  81 mg Oral Nightly    atorvastatin  20 mg Oral Daily    desvenlafaxine succinate  150 mg Oral QAM    hydrOXYzine  50 mg Oral Nightly    inadvertent computerized transcription errors may be present.

## 2021-02-07 NOTE — PROGRESS NOTES
Department of Internal Medicine  Infectious Diseases  Progress  Note      C/C : MSSA bacteremia ,right psoas abscess     Pt is awake and alert  Denies fever  Reports back pain   Afebrile       Current Facility-Administered Medications   Medication Dose Route Frequency Provider Last Rate Last Admin    lidocaine PF 1 % injection 5 mL  5 mL Intradermal Once Bobby Dalton MD        sodium chloride flush 0.9 % injection 10 mL  10 mL Intravenous PRN Bobby Dalton MD        heparin flush 100 UNIT/ML injection 300 Units  3 mL Intravenous 2 times per day Elsa Jaimes MD   300 Units at 02/07/21 0907    heparin flush 100 UNIT/ML injection 300 Units  3 mL Intracatheter PRN Bobby Dalton MD        nafcillin 2,000 mg in dextrose 5 % 100 mL IVPB (mini-bag)  2,000 mg Intravenous Q4H Bobby Dalton  mL/hr at 02/07/21 1305 2,000 mg at 02/07/21 1305    amLODIPine (NORVASC) tablet 2.5 mg  2.5 mg Oral Daily Jayde Gottlieb MD   2.5 mg at 02/06/21 0816    aspirin chewable tablet 81 mg  81 mg Oral Nightly Jayde Gottlieb MD   81 mg at 02/06/21 2043    atorvastatin (LIPITOR) tablet 20 mg  20 mg Oral Daily Jayde Gottlieb MD   20 mg at 02/07/21 0906    desvenlafaxine succinate (PRISTIQ) extended release tablet 150 mg  150 mg Oral QAM Jayde Gottlieb MD   150 mg at 02/07/21 0906    hydrOXYzine (VISTARIL) capsule 50 mg  50 mg Oral Nightly Jayde Gottlieb MD   50 mg at 02/06/21 2043    insulin lispro (HUMALOG) injection vial 0-6 Units  0-6 Units Subcutaneous TID WC Jayde Gottlieb MD   1 Units at 02/07/21 0908    insulin lispro (HUMALOG) injection vial 0-3 Units  0-3 Units Subcutaneous Nightly Jayde Gottlieb MD   1 Units at 02/04/21 2225    insulin glargine (LANTUS) injection vial 80 Units  80 Units Subcutaneous Nightly Jayde Gottlieb MD   Stopped at 02/06/21 2146    lamoTRIgine (LAMICTAL) tablet 150 mg  150 mg Oral Daily Jayde Gottlieb MD   150 mg at 02/07/21 0906    lidocaine 4 % external patch 1 patch  1 patch Transdermal Daily Tesfaye Oates MD   1 patch at 02/07/21 0905    losartan (COZAAR) tablet 100 mg  100 mg Oral Daily Tesfaye Oates MD   Stopped at 02/06/21 0816    pantoprazole (PROTONIX) tablet 40 mg  40 mg Oral QAM AC Tesfaye Oates MD   40 mg at 02/07/21 0514    rOPINIRole (REQUIP) tablet 0.5 mg  0.5 mg Oral TID Tesfaye Oates MD   0.5 mg at 02/07/21 1311    traMADol (ULTRAM) tablet 50 mg  50 mg Oral Q6H PRN Tesfaye Oates MD   50 mg at 02/07/21 1413    traZODone (DESYREL) tablet 150 mg  150 mg Oral Nightly Tesfaye Oates MD   150 mg at 02/06/21 2043    morphine (PF) injection 2 mg  2 mg Intravenous Q4H PRN Tesfaye Oates MD   2 mg at 02/07/21 0801    sodium chloride flush 0.9 % injection 10 mL  10 mL Intravenous 2 times per day Tesfaye Oates MD   10 mL at 02/07/21 0906    sodium chloride flush 0.9 % injection 10 mL  10 mL Intravenous PRN Tesfaye Oates MD        promethazine (PHENERGAN) tablet 12.5 mg  12.5 mg Oral Q6H PRN Tesfaye Oates MD        Or    ondansetron (ZOFRAN) injection 4 mg  4 mg Intravenous Q6H PRN Tesfaye Oates MD        acetaminophen (TYLENOL) tablet 650 mg  650 mg Oral Q6H PRN Tesfaye Oates MD        Or    acetaminophen (TYLENOL) suppository 650 mg  650 mg Rectal Q6H PRN Tesfaye Oates MD        heparin (porcine) injection 5,000 Units  5,000 Units Subcutaneous Q8H Tesfaye Oates MD   5,000 Units at 02/07/21 1311    polyethylene glycol (GLYCOLAX) packet 17 g  17 g Oral BID Tesfaye Oates MD   17 g at 02/06/21 0815    bisacodyl (DULCOLAX) EC tablet 5 mg  5 mg Oral Daily PRN Tesfaye Oates MD        glucose (GLUTOSE) 40 % oral gel 15 g  15 g Oral PRN Tesfaye Oates MD        dextrose 50 % IV solution  12.5 g Intravenous PRN Tesfaye Oates MD        glucagon (rDNA) injection 1 mg  1 mg Intramuscular PRN Tesfaye Oates MD        dextrose 5 % solution  100 mL/hr Intravenous PRN Tesfaye Oates MD        0.45 % sodium chloride infusion   Intravenous Continuous Tesfaye Oates MD 75 mL/hr at 02/06/21 0049 New Bag at 02/06/21 0049    carbidopa-levodopa (SINEMET CR)  MG per extended release tablet 2 tablet  2 tablet Oral TID Lindsey Braun MD   2 tablet at 02/07/21 1311         REVIEW OF SYSTEMS:    CONSTITUTIONAL:  Denies fever, chill or rigors, nausea or vomiting. HEENT: denies blurring of vision or double vision, denies hearing problem  RESPIRATORY: denies cough, shortness of breath, sputum expectoration, chest pain. CARDIOVASCULAR:  Denies palpitation  GASTROINTESTINAL:  Denies abdomen pain, diarrhea or constipation. GENITOURINARY:  Denies burning urination or frequency of urination  INTEGUMENT: denies wound , rash  HEMATOLOGIC/LYMPHATIC:  Denies lymph node swelling, gum bleeding or easy bruising. MUSCULOSKELETAL: Low back pain   NEUROLOGICAL:  Denies light headed, dizziness,    PHYSICAL EXAM:      Vitals:     BP (!) 106/53   Pulse 99   Temp 99 °F (37.2 °C) (Temporal)   Resp 18   Ht 5' (1.524 m)   Wt 187 lb 6 oz (85 kg)   SpO2 95%   BMI 36.59 kg/m²       General Appearance:    Awake, alert , no acute distress. Head:    Normocephalic, atraumatic   Eyes:    No pallor, no icterus,   Ears:    No obvious deformity or drainage.    Nose:   No nasal drainage   Throat:   Mucosa moist, no oral thrush   Neck:   Supple, no lymphadenopathy   Back:     no CVA tenderness   Lungs:     Clear to auscultation bilaterally, no wheeze    Heart:    Regular rate and rhythm, no murmur,   Abdomen:     Soft, non-tender, bowel sounds present    Extremities:   ++ edema    Pulses:   Dorsalis pedis palpable    Skin:   no rashes or lesions     Lines : Left basilic PICC ( 2/6)     CBC with Differential:      Lab Results   Component Value Date    WBC 6.8 02/07/2021    RBC 2.77 02/07/2021    HGB 7.6 02/07/2021    HCT 24.0 02/07/2021     02/07/2021    MCV 86.6 02/07/2021    MCH 27.4 02/07/2021    MCHC 31.7 02/07/2021    RDW 15.6 02/07/2021    NRBC 0.0 03/15/2019    SEGSPCT 74 08/20/2013    LYMPHOPCT 15.5 02/07/2021    MONOPCT 7.8 02/07/2021    MYELOPCT 0.9 03/15/2019    EOSPCT 1 06/16/2017    BASOPCT 0.3 02/07/2021    MONOSABS 0.53 02/07/2021    LYMPHSABS 1.05 02/07/2021    EOSABS 0.05 02/07/2021    BASOSABS 0.02 02/07/2021       CMP     Lab Results   Component Value Date     02/07/2021    K 4.4 02/07/2021    K 5.0 02/03/2021     02/07/2021    CO2 21 02/07/2021    BUN 16 02/07/2021    CREATININE 0.8 02/07/2021    GFRAA >60 02/07/2021    LABGLOM >60 02/07/2021    GLUCOSE 104 02/07/2021    PROT 6.6 02/03/2021    LABALBU 3.1 02/03/2021    CALCIUM 8.5 02/07/2021    BILITOT 0.8 02/03/2021    ALKPHOS 208 02/03/2021    AST 12 02/03/2021    ALT 6 02/03/2021         Hepatic Function Panel:    Lab Results   Component Value Date    ALKPHOS 208 02/03/2021    ALT 6 02/03/2021    AST 12 02/03/2021    PROT 6.6 02/03/2021    BILITOT 0.8 02/03/2021    BILIDIR 0.2 07/20/2013    LABALBU 3.1 02/03/2021       PT/INR:    Lab Results   Component Value Date    PROTIME 15.8 02/04/2021    INR 1.4 02/04/2021       TSH:    Lab Results   Component Value Date    TSH 2.74 06/16/2017       U/A:    Lab Results   Component Value Date    COLORU Yellow 10/26/2020    PHUR 6.0 10/26/2020    WBCUA 2-5 05/29/2019    RBCUA NONE 05/29/2019    RBCUA NONE 03/25/2013    YEAST RARE 10/27/2015    BACTERIA RARE 05/29/2019    CLARITYU Clear 10/26/2020    SPECGRAV 1.015 10/26/2020    LEUKOCYTESUR Negative 10/26/2020    UROBILINOGEN 0.2 10/26/2020    BILIRUBINUR Negative 10/26/2020    BLOODU Negative 10/26/2020    GLUCOSEU Negative 10/26/2020       ABG:  No results found for: EHZ7XZB, BEART, I7PETVJU, PHART, THGBART, ZVJ9GEV, PO2ART, DMG8WMQ    MICROBIOLOGY:    Blood culture -    Blood ID, Molecular [[id number]] (Abnormal)    Collected: 02/03/21 0254    Updated: 02/04/21 1012     Bottle Type Aerobic    Source of Blood Culture No site given    Order Number [id number]    Enterobacter cloacae complex by PCR Not Detected    Escherichia coli by PCR Not Detected    Klebsiella oxytoca by PCR Not Detected    Klebsiella pneumoniae group by PCR Not Detected    Proteus species by PCR Not Detected    Streptococcus agalactiae by PCR Not Detected    Staphylococcus aureus by PCR DETECTEDPanic      Serratia marcescens by PCR Not Detected    Streptococcus pneumoniae by PCR Not Detected    Streptococcus pyogenes  by PCR Not Detected    Acinetobacter baumannii by PCR Not Detected    Candida albicans by PCR Not Detected    Candida glabrata by PCR Not Detected    Candida krusei by PCR Not Detected    Candida parapsilosis by PCR Not Detected    Candida tropicalis by PCR Not Detected     Enterobacteriaceae by PCR Not Detected    Enterococcus by PCR Not Detected    Haemophilus Influenzae by PCR Not Detected    Listeria monocytogenes by PCR Not Detected    Neisseria meningitidis by PCR Not Detected    Pseudomonas aeruginosa by PCR Not Detected    Staphylococcus species by PCR DETECTEDPanic      Streptococcus species by PCR Not Detected    Methicillin Resistance mecA/C  by PCR Not Detected   Narrative:     CALL  Chris Gill.Sherly tel. ,   Microbiology results called to and read back by Emily Varner RN, 02/04/2021        Abscess cx -     Specimen: Abscess Updated: 02/06/21 1137    Organism Staphylococcus aureusAbnormal     Culture Surgical --    Moderate growth   Sensitivity to follow    Narrative:             Radiology :   MRI -    Large right iliopsoas intramuscular/retroperitoneal abscess measuring 4.4 x   7.5 x 13.6 cm in size extending to the level of the lower pelvis with   associated surrounding myositis and retroperitoneal infection. Septic arthritis of the right SI joint with osteomyelitis on both sides of   the right SI joint. The findings were sent to the Radiology Results [address] at 3:19   pm on 2/6/2021to be communicated to a licensed caregiver. IMPRESSION:     1. Right psoas abscess s/p CT guided drainage ( cx  MSSA  ) -   2.  MSSA bacteremia -follow up blood cx neg on 2/5 RECOMMENDATIONS:      1. Nafcillin 2 grams IV q 4 hrs   2.   Neuro surgery reevaluation

## 2021-02-08 LAB
ANION GAP SERPL CALCULATED.3IONS-SCNC: 8 MMOL/L (ref 7–16)
BASOPHILS ABSOLUTE: 0.02 E9/L (ref 0–0.2)
BASOPHILS RELATIVE PERCENT: 0.4 % (ref 0–2)
BUN BLDV-MCNC: 14 MG/DL (ref 8–23)
CALCIUM SERPL-MCNC: 8.1 MG/DL (ref 8.6–10.2)
CHLORIDE BLD-SCNC: 102 MMOL/L (ref 98–107)
CO2: 25 MMOL/L (ref 22–29)
CREAT SERPL-MCNC: 0.8 MG/DL (ref 0.5–1)
EOSINOPHILS ABSOLUTE: 0.07 E9/L (ref 0.05–0.5)
EOSINOPHILS RELATIVE PERCENT: 1.3 % (ref 0–6)
GFR AFRICAN AMERICAN: >60
GFR NON-AFRICAN AMERICAN: >60 ML/MIN/1.73
GLUCOSE BLD-MCNC: 85 MG/DL (ref 74–99)
HCT VFR BLD CALC: 23.9 % (ref 34–48)
HEMOGLOBIN: 7.1 G/DL (ref 11.5–15.5)
IMMATURE GRANULOCYTES #: 0.06 E9/L
IMMATURE GRANULOCYTES %: 1.1 % (ref 0–5)
LYMPHOCYTES ABSOLUTE: 1.17 E9/L (ref 1.5–4)
LYMPHOCYTES RELATIVE PERCENT: 21 % (ref 20–42)
MCH RBC QN AUTO: 26.6 PG (ref 26–35)
MCHC RBC AUTO-ENTMCNC: 29.7 % (ref 32–34.5)
MCV RBC AUTO: 89.5 FL (ref 80–99.9)
METER GLUCOSE: 107 MG/DL (ref 74–99)
METER GLUCOSE: 110 MG/DL (ref 74–99)
METER GLUCOSE: 118 MG/DL (ref 74–99)
METER GLUCOSE: 135 MG/DL (ref 74–99)
MONOCYTES ABSOLUTE: 0.37 E9/L (ref 0.1–0.95)
MONOCYTES RELATIVE PERCENT: 6.6 % (ref 2–12)
NEUTROPHILS ABSOLUTE: 3.89 E9/L (ref 1.8–7.3)
NEUTROPHILS RELATIVE PERCENT: 69.6 % (ref 43–80)
PDW BLD-RTO: 16.1 FL (ref 11.5–15)
PLATELET # BLD: 342 E9/L (ref 130–450)
PMV BLD AUTO: 8.6 FL (ref 7–12)
POTASSIUM SERPL-SCNC: 4.1 MMOL/L (ref 3.5–5)
RBC # BLD: 2.67 E12/L (ref 3.5–5.5)
SODIUM BLD-SCNC: 135 MMOL/L (ref 132–146)
WBC # BLD: 5.6 E9/L (ref 4.5–11.5)

## 2021-02-08 PROCEDURE — 97166 OT EVAL MOD COMPLEX 45 MIN: CPT

## 2021-02-08 PROCEDURE — 36415 COLL VENOUS BLD VENIPUNCTURE: CPT

## 2021-02-08 PROCEDURE — 6360000002 HC RX W HCPCS: Performed by: STUDENT IN AN ORGANIZED HEALTH CARE EDUCATION/TRAINING PROGRAM

## 2021-02-08 PROCEDURE — 2580000003 HC RX 258: Performed by: INTERNAL MEDICINE

## 2021-02-08 PROCEDURE — 6360000002 HC RX W HCPCS: Performed by: INTERNAL MEDICINE

## 2021-02-08 PROCEDURE — 97530 THERAPEUTIC ACTIVITIES: CPT

## 2021-02-08 PROCEDURE — 82962 GLUCOSE BLOOD TEST: CPT

## 2021-02-08 PROCEDURE — 85025 COMPLETE CBC W/AUTO DIFF WBC: CPT

## 2021-02-08 PROCEDURE — 80048 BASIC METABOLIC PNL TOTAL CA: CPT

## 2021-02-08 PROCEDURE — 1200000000 HC SEMI PRIVATE

## 2021-02-08 PROCEDURE — 2500000003 HC RX 250 WO HCPCS: Performed by: INTERNAL MEDICINE

## 2021-02-08 PROCEDURE — 2580000003 HC RX 258: Performed by: STUDENT IN AN ORGANIZED HEALTH CARE EDUCATION/TRAINING PROGRAM

## 2021-02-08 PROCEDURE — 6370000000 HC RX 637 (ALT 250 FOR IP): Performed by: STUDENT IN AN ORGANIZED HEALTH CARE EDUCATION/TRAINING PROGRAM

## 2021-02-08 PROCEDURE — 97161 PT EVAL LOW COMPLEX 20 MIN: CPT

## 2021-02-08 RX ADMIN — LAMOTRIGINE 150 MG: 100 TABLET ORAL at 08:37

## 2021-02-08 RX ADMIN — HEPARIN SODIUM 5000 UNITS: 10000 INJECTION INTRAVENOUS; SUBCUTANEOUS at 13:59

## 2021-02-08 RX ADMIN — HYDROXYZINE PAMOATE 50 MG: 50 CAPSULE ORAL at 20:47

## 2021-02-08 RX ADMIN — PANTOPRAZOLE SODIUM 40 MG: 40 TABLET, DELAYED RELEASE ORAL at 05:42

## 2021-02-08 RX ADMIN — CARBIDOPA AND LEVODOPA 2 TABLET: 25; 100 TABLET, EXTENDED RELEASE ORAL at 20:47

## 2021-02-08 RX ADMIN — ONDANSETRON 4 MG: 2 INJECTION INTRAMUSCULAR; INTRAVENOUS at 10:03

## 2021-02-08 RX ADMIN — NAFCILLIN SODIUM 2000 MG: 2 INJECTION, POWDER, FOR SOLUTION INTRAMUSCULAR; INTRAVENOUS at 04:57

## 2021-02-08 RX ADMIN — POLYETHYLENE GLYCOL 3350 17 G: 17 POWDER, FOR SOLUTION ORAL at 20:47

## 2021-02-08 RX ADMIN — NAFCILLIN SODIUM 2000 MG: 2 INJECTION, POWDER, FOR SOLUTION INTRAMUSCULAR; INTRAVENOUS at 16:39

## 2021-02-08 RX ADMIN — CARBIDOPA AND LEVODOPA 2 TABLET: 25; 100 TABLET, EXTENDED RELEASE ORAL at 08:38

## 2021-02-08 RX ADMIN — ROPINIROLE HYDROCHLORIDE 0.5 MG: 0.5 TABLET, FILM COATED ORAL at 13:59

## 2021-02-08 RX ADMIN — MORPHINE SULFATE 2 MG: 2 INJECTION, SOLUTION INTRAMUSCULAR; INTRAVENOUS at 05:46

## 2021-02-08 RX ADMIN — NAFCILLIN SODIUM 2000 MG: 2 INJECTION, POWDER, FOR SOLUTION INTRAMUSCULAR; INTRAVENOUS at 12:59

## 2021-02-08 RX ADMIN — DESVENLAFAXINE 150 MG: 50 TABLET, EXTENDED RELEASE ORAL at 08:37

## 2021-02-08 RX ADMIN — ROPINIROLE HYDROCHLORIDE 0.5 MG: 0.5 TABLET, FILM COATED ORAL at 08:37

## 2021-02-08 RX ADMIN — TRAZODONE HYDROCHLORIDE 150 MG: 150 TABLET ORAL at 20:47

## 2021-02-08 RX ADMIN — HEPARIN SODIUM 5000 UNITS: 10000 INJECTION INTRAVENOUS; SUBCUTANEOUS at 05:42

## 2021-02-08 RX ADMIN — Medication 10 ML: at 08:37

## 2021-02-08 RX ADMIN — NAFCILLIN SODIUM 2000 MG: 2 INJECTION, POWDER, FOR SOLUTION INTRAMUSCULAR; INTRAVENOUS at 20:47

## 2021-02-08 RX ADMIN — MORPHINE SULFATE 2 MG: 2 INJECTION, SOLUTION INTRAMUSCULAR; INTRAVENOUS at 21:28

## 2021-02-08 RX ADMIN — ATORVASTATIN CALCIUM 20 MG: 20 TABLET, FILM COATED ORAL at 08:37

## 2021-02-08 RX ADMIN — SODIUM CHLORIDE, PRESERVATIVE FREE 300 UNITS: 5 INJECTION INTRAVENOUS at 08:37

## 2021-02-08 RX ADMIN — LOSARTAN POTASSIUM 100 MG: 50 TABLET, FILM COATED ORAL at 08:37

## 2021-02-08 RX ADMIN — NAFCILLIN SODIUM 2000 MG: 2 INJECTION, POWDER, FOR SOLUTION INTRAMUSCULAR; INTRAVENOUS at 08:37

## 2021-02-08 RX ADMIN — SODIUM CHLORIDE, PRESERVATIVE FREE 300 UNITS: 5 INJECTION INTRAVENOUS at 20:46

## 2021-02-08 RX ADMIN — TRAMADOL HYDROCHLORIDE 50 MG: 50 TABLET, FILM COATED ORAL at 11:40

## 2021-02-08 RX ADMIN — NAFCILLIN SODIUM 2000 MG: 2 INJECTION, POWDER, FOR SOLUTION INTRAMUSCULAR; INTRAVENOUS at 00:06

## 2021-02-08 RX ADMIN — ASPIRIN 81 MG: 81 TABLET, CHEWABLE ORAL at 20:47

## 2021-02-08 RX ADMIN — HEPARIN SODIUM 5000 UNITS: 10000 INJECTION INTRAVENOUS; SUBCUTANEOUS at 20:46

## 2021-02-08 RX ADMIN — CARBIDOPA AND LEVODOPA 2 TABLET: 25; 100 TABLET, EXTENDED RELEASE ORAL at 13:59

## 2021-02-08 RX ADMIN — ROPINIROLE HYDROCHLORIDE 0.5 MG: 0.5 TABLET, FILM COATED ORAL at 20:47

## 2021-02-08 RX ADMIN — AMLODIPINE BESYLATE 2.5 MG: 2.5 TABLET ORAL at 08:37

## 2021-02-08 RX ADMIN — MORPHINE SULFATE 2 MG: 2 INJECTION, SOLUTION INTRAMUSCULAR; INTRAVENOUS at 16:40

## 2021-02-08 ASSESSMENT — PAIN DESCRIPTION - ONSET
ONSET: ON-GOING

## 2021-02-08 ASSESSMENT — PAIN SCALES - GENERAL
PAINLEVEL_OUTOF10: 5
PAINLEVEL_OUTOF10: 3
PAINLEVEL_OUTOF10: 10
PAINLEVEL_OUTOF10: 8

## 2021-02-08 ASSESSMENT — PAIN DESCRIPTION - DESCRIPTORS
DESCRIPTORS: ACHING;CONSTANT;SORE
DESCRIPTORS: ACHING;CONSTANT

## 2021-02-08 ASSESSMENT — PAIN - FUNCTIONAL ASSESSMENT: PAIN_FUNCTIONAL_ASSESSMENT: PREVENTS OR INTERFERES SOME ACTIVE ACTIVITIES AND ADLS

## 2021-02-08 ASSESSMENT — PAIN DESCRIPTION - PAIN TYPE: TYPE: CHRONIC PAIN

## 2021-02-08 ASSESSMENT — PAIN DESCRIPTION - LOCATION
LOCATION: HIP

## 2021-02-08 ASSESSMENT — PAIN DESCRIPTION - ORIENTATION
ORIENTATION: RIGHT
ORIENTATION: RIGHT

## 2021-02-08 ASSESSMENT — PAIN DESCRIPTION - DIRECTION: RADIATING_TOWARDS: RIGHT LEG

## 2021-02-08 ASSESSMENT — PAIN DESCRIPTION - FREQUENCY: FREQUENCY: CONTINUOUS

## 2021-02-08 NOTE — PROGRESS NOTES
Physical Therapy    Physical Therapy Initial Assessment     Name: Rudi Nur  : [id number]  MRN: [id number]    Referring Provider:  Tony Marsh MD    Date of Service: 2021    Evaluating PT:  Shahram Martinez, PT, DPT. [id number]    Room #:  3303/9731-R  Diagnosis:  Abscess of epidural space  Reason for admission:  R iliopsoas abscess, R SI joint infection  Precautions:  Falls, drain to R lower abdomen   Procedures: 2/5 abscess aspiration  Equipment Recommendations:  FWW, wheelchair    SUBJECTIVE:  Pt lives with son in a 2nd floor duplex with 13 stair(s) to enter and 1 rail(s). Pt ambulated with quad cane PTA. OBJECTIVE:   Initial Evaluation  Date:  Treatment   Short Term/ Long Term   Goals   AM-PAC 6 Clicks      Was pt agreeable to Eval/treatment? Yes      Does pt have pain? 2/10 R hip     Bed Mobility  Rolling: NT  Supine to sit: ModA  Sit to supine: ModA  Scooting: MaxA  SBA   Transfers Sit to stand: MaxA  Stand to sit: MaxA  Stand pivot: NT  SBA   Ambulation    x2 side steps with [address] ModA    >25 feet with [address] SBA   Stair negotiation: ascended and descended  NT  TBD   ROM BUE:  See OT eval   BLE:  WFL     Strength BUE:  See OT eval   BLE:  knee ext 4/5  Ankle DF 4/5  Increase by 1/3 MMT grade    Balance Sitting EOB:  SBA  Dynamic Standing:  MaxA  Sitting EOB:  indep  Dynamic Standing:  SBA     -Pt is A & O x 3  -Sensation:  unremarkable   -Edema:  unremarkable     Therapeutic Exercises:  functional activity     Patient education  Pt educated on safety, sequencing of transfers, and role of PT    Patient response to education:   Pt verbalized understanding Pt demonstrated skill Pt requires further education in this area   Yes  No  Yes      ASSESSMENT:    Comments:  Pt received supine in bed and agreeable to PT session  Pt requiring significant assistance to complete all transfers. Pt expressing weakness, pain to R hip, and dizziness.  Additionally, pt with increased anxiety/tremoring once sitting EOB and standing. Stood from bedside x4 with assist to rock and cues for positioning - resting between all stands. Attempted stepping to chair x 4, pt unable to advance LEs more than 1-2 steps. Pt very unsteady and exhibiting LE weakness unable to advance feet. Returned to bed, unable to step/support self to pivot to chair. Pt with all needs met and call light in reach. Pt would benefit from continued PT POC to address functional deficits described above. Treatment:  Patient practiced and was instructed in the following treatment:     Patient education provided continuously throughout session for sequencing, safety maintenance, and improving any deficits found during the evaluation.  Bed mobility training - pt given verbal and tactile cues to facilitate proper sequencing and safety during rolling and supine>sit as well as provided with physical assistance to complete task     Sitting EOB for >15 minutes for upright tolerance, postural awareness and BLE ROM    STS and pivot transfer training - pt educated on proper hand and foot placement, safety and sequencing, and use of proper technique to safely complete sit<>stand and pivot transfers with hands on assistance to complete task safely    Stood x 4 reps, side stepping attempted each stand. Held stands 1-2 minutes per. Pt's/ family goals   1. Get stronger    Patient and or family understand(s) diagnosis, prognosis, and plan of care. Yes     PLAN:    Current Treatment Recommendations   [x] Strengthening     [] ROM   [x] Balance Training   [x] Endurance Training   [x] Transfer Training   [x] Gait Training   [] Stair Training   [] Positioning   [x] Safety and Education Training   [] Patient/Caregiver Education   [] HEP  [] Other     Frequency of treatments: 2-5x/week x 1-2 weeks.     Time in  0730  Time out  0800    Total Treatment Time 25 minutes     Evaluation Time includes thorough review of current medical information, gathering information on past medical history/social history and prior level of function, completion of standardized testing/informal observation of tasks, assessment of data and education on plan of care and goals.     CPT codes:  [x] Low Complexity PT evaluation 17755  [] Moderate Complexity PT evaluation 00242  [] High Complexity PT evaluation 88300  [] PT Re-evaluation 30405  [] Gait training 34243 - minutes  [] Manual therapy 75243 - minutes  [x] Therapeutic activities 45244 25 minutes  [] Therapeutic exercises 17672 - minutes  [] Neuromuscular reeducation 34377 - minutes     Dean Bray, PT, DPT  [id number]

## 2021-02-08 NOTE — PROGRESS NOTES
[facility] that patient's abscess should be reevaluated. SROC suggests it is drained again in the IR department.

## 2021-02-08 NOTE — PROGRESS NOTES
Department of Orthopedic Surgery  Resident Progress Note    Patient seen and examined, resting in bed. Pain controlled. No new complaints. Patient states she still has anterior right sided pelvic pain although it continues to improve since drain placement. VITALS:  /61   Pulse 86   Temp 98.7 °F (37.1 °C) (Temporal)   Resp 17   Ht 5' (1.524 m)   Wt 187 lb 6 oz (85 kg)   SpO2 95%   BMI 36.59 kg/m²     General: alert and oriented to person, place and time, well-developed and well-nourished, in no acute distress    MUSCULOSKELETAL:   · Skin intact circumferentially over the right hemipelvis anteriorly and posteriorly. There is a drain noted within the anterior lower right hemipelvis  · Compartments soft and compressible  · No pain with attempted logroll  · +PF/DF/EHL  · +2/4 DP & PT pulses, Brisk Cap refill, Toes warm and perfused  · Distal sensation grossly intact to Peroneals, Sural, Saphenous, and tibial nrs    CBC:   Lab Results   Component Value Date    WBC 6.8 02/07/2021    HGB 7.6 02/07/2021    HCT 24.0 02/07/2021     02/07/2021     PT/INR:    Lab Results   Component Value Date    PROTIME 15.8 02/04/2021    INR 1.4 02/04/2021         ASSESSMENT  · Right-sided iliopsoas abscess  · Right sacroiliac joint bony changes suggestive of osteomyelitis/infection    PLAN      · Results of interventional radiology aspiration yielded surgical cultures of staph aureus  · Recommend continued IV antibiotics per infectious disease   · Continue drain and management as indicated  · No acute orthopedic surgical intervention planned  · Orthopedic service will follow peripherally for continued clinical improvement.   Please contact with questions or concerns  · Discuss with Dr. Nanette Cano

## 2021-02-08 NOTE — PROGRESS NOTES
Department of Internal Medicine  Infectious Diseases  Progress  Note      C/C : MSSA bacteremia ,right psoas abscess     Pt is awake and alert  Denies fever  Reports back pain   Afebrile       Current Facility-Administered Medications   Medication Dose Route Frequency Provider Last Rate Last Admin    lidocaine PF 1 % injection 5 mL  5 mL Intradermal Once Bobby Dalton MD        sodium chloride flush 0.9 % injection 10 mL  10 mL Intravenous PRN Bobby Dalton MD        heparin flush 100 UNIT/ML injection 300 Units  3 mL Intravenous 2 times per day Shayy Celestin MD   300 Units at 02/08/21 0837    heparin flush 100 UNIT/ML injection 300 Units  3 mL Intracatheter PRN Bobby Dalton MD        nafcillin 2,000 mg in dextrose 5 % 100 mL IVPB (mini-bag)  2,000 mg Intravenous Q4H Shayy Celestin MD   Stopped at 02/08/21 0938    amLODIPine (NORVASC) tablet 2.5 mg  2.5 mg Oral Daily Antolin Kee MD   2.5 mg at 02/08/21 4009    aspirin chewable tablet 81 mg  81 mg Oral Nightly Antolin Kee MD   81 mg at 02/07/21 2043    atorvastatin (LIPITOR) tablet 20 mg  20 mg Oral Daily Antolin Kee MD   20 mg at 02/08/21 0837    desvenlafaxine succinate (PRISTIQ) extended release tablet 150 mg  150 mg Oral QAM Antolin Kee MD   150 mg at 02/08/21 0837    hydrOXYzine (VISTARIL) capsule 50 mg  50 mg Oral Nightly Antolin Kee MD   50 mg at 02/07/21 2043    insulin lispro (HUMALOG) injection vial 0-6 Units  0-6 Units Subcutaneous TID WC Antolin Kee MD   1 Units at 02/07/21 1627    insulin lispro (HUMALOG) injection vial 0-3 Units  0-3 Units Subcutaneous Nightly Antolin Kee MD   1 Units at 02/07/21 2044    insulin glargine (LANTUS) injection vial 80 Units  80 Units Subcutaneous Nightly Antolin Kee MD   80 Units at 02/07/21 2044    lamoTRIgine (LAMICTAL) tablet 150 mg  150 mg Oral Daily Antolin Kee MD   150 mg at 02/08/21 0837    lidocaine 4 % external patch 1 patch  1 patch Transdermal Daily Antolin Kee MD   1 patch 6259    carbidopa-levodopa (SINEMET CR)  MG per extended release tablet 2 tablet  2 tablet Oral TID Apryl Allan MD   2 tablet at 02/08/21 0366         REVIEW OF SYSTEMS:    CONSTITUTIONAL:  Denies fever, chill or rigors, nausea or vomiting. HEENT: denies blurring of vision or double vision, denies hearing problem  RESPIRATORY: denies cough, shortness of breath, sputum expectoration, chest pain. CARDIOVASCULAR:  Denies palpitation  GASTROINTESTINAL:  Denies abdomen pain, diarrhea or constipation. GENITOURINARY:  Denies burning urination or frequency of urination  INTEGUMENT: denies wound , rash  HEMATOLOGIC/LYMPHATIC:  Denies lymph node swelling, gum bleeding or easy bruising. MUSCULOSKELETAL: Low back pain   NEUROLOGICAL:  Denies light headed, dizziness,    PHYSICAL EXAM:      Vitals:     /60   Pulse 84   Temp 97.7 °F (36.5 °C) (Oral)   Resp 18   Ht 5' (1.524 m)   Wt 187 lb 6 oz (85 kg)   SpO2 95%   BMI 36.59 kg/m²       General Appearance:    Awake, alert , no acute distress. Head:    Normocephalic, atraumatic   Eyes:    No pallor, no icterus,   Ears:    No obvious deformity or drainage.    Nose:   No nasal drainage   Throat:   Mucosa moist, no oral thrush   Neck:   Supple, no lymphadenopathy   Back:     no CVA tenderness   Lungs:     Clear to auscultation bilaterally, no wheeze    Heart:    Regular rate and rhythm, no murmur,   Abdomen:     Soft, non-tender, bowel sounds present    Extremities:   ++ edema    Pulses:   Dorsalis pedis palpable    Skin:   no rashes or lesions     Lines : Left basilic PICC ( 2/6)     CBC with Differential:      Lab Results   Component Value Date    WBC 5.6 02/08/2021    RBC 2.67 02/08/2021    HGB 7.1 02/08/2021    HCT 23.9 02/08/2021     02/08/2021    MCV 89.5 02/08/2021    MCH 26.6 02/08/2021    MCHC 29.7 02/08/2021    RDW 16.1 02/08/2021    NRBC 0.0 03/15/2019    SEGSPCT 74 08/20/2013    LYMPHOPCT 21.0 02/08/2021    MONOPCT 6.6 02/08/2021 MYELOPCT 0.9 03/15/2019    EOSPCT 1 06/16/2017    BASOPCT 0.4 02/08/2021    MONOSABS 0.37 02/08/2021    LYMPHSABS 1.17 02/08/2021    EOSABS 0.07 02/08/2021    BASOSABS 0.02 02/08/2021       CMP     Lab Results   Component Value Date     02/08/2021    K 4.1 02/08/2021    K 5.0 02/03/2021     02/08/2021    CO2 25 02/08/2021    BUN 14 02/08/2021    CREATININE 0.8 02/08/2021    GFRAA >60 02/08/2021    LABGLOM >60 02/08/2021    GLUCOSE 85 02/08/2021    PROT 6.6 02/03/2021    LABALBU 3.1 02/03/2021    CALCIUM 8.1 02/08/2021    BILITOT 0.8 02/03/2021    ALKPHOS 208 02/03/2021    AST 12 02/03/2021    ALT 6 02/03/2021         Hepatic Function Panel:    Lab Results   Component Value Date    ALKPHOS 208 02/03/2021    ALT 6 02/03/2021    AST 12 02/03/2021    PROT 6.6 02/03/2021    BILITOT 0.8 02/03/2021    BILIDIR 0.2 07/20/2013    LABALBU 3.1 02/03/2021       PT/INR:    Lab Results   Component Value Date    PROTIME 15.8 02/04/2021    INR 1.4 02/04/2021       TSH:    Lab Results   Component Value Date    TSH 2.74 06/16/2017       U/A:    Lab Results   Component Value Date    COLORU Yellow 10/26/2020    PHUR 6.0 10/26/2020    WBCUA 2-5 05/29/2019    RBCUA NONE 05/29/2019    RBCUA NONE 03/25/2013    YEAST RARE 10/27/2015    BACTERIA RARE 05/29/2019    CLARITYU Clear 10/26/2020    SPECGRAV 1.015 10/26/2020    LEUKOCYTESUR Negative 10/26/2020    UROBILINOGEN 0.2 10/26/2020    BILIRUBINUR Negative 10/26/2020    BLOODU Negative 10/26/2020    GLUCOSEU Negative 10/26/2020       ABG:  No results found for: YRF7IJO, BEART, Q8VKMUFY, PHART, THGBART, HSQ3IZO, PO2ART, BGK3OZN    MICROBIOLOGY:    Blood culture -    Blood ID, Molecular [[id number]] (Abnormal)    Collected: 02/03/21 0254    Updated: 02/04/21 1012     Bottle Type Aerobic    Source of Blood Culture No site given    Order Number [id number]    Enterobacter cloacae complex by PCR Not Detected    Escherichia coli by PCR Not Detected    Klebsiella oxytoca by PCR Not 1. Nafcillin 2 grams IV q 4 hrs   2.  Gen surgery re eval ( abscess not adequately drained ) - Discussed with Dr Jennifer Luis  3. Echo

## 2021-02-08 NOTE — PROGRESS NOTES
OCCUPATIONAL THERAPY INITIAL EVALUATION      Date:2021  Patient Name: Feli Carbone  MRN: [id number]  : 1950  Room: [address] [place], [place] [address]A      Evaluating [address] [place] [address], OTR/L #[id number]    AM-PAC Daily Activity Raw Score:   Recommended Adaptive Equipment: TBD     Diagnosis: Abscess in epidural space of lumbar spine [G06.1]     Referring physician: Shavon Chan MD  Procedures: CT GUIDED ABSCESS FLUID DRAIN on    PICC 21  Pertinent Medical History: anxiety, asthma, CAD, CA, CKD, depression, DM, MRSA, HTN, SERENA, Parkinson's    Precautions:  Falls, R hip pain, Parkinson's, bed alarm, R abdominal drain     Home Living: Pt lives with son in 2 floor home. Basement shower and laundry - flight of stairs, 1 handrail  Bathroom setup: walk in shower (in basement, pt's preference)   Equipment owned: quad cane    Prior Level of Function: independent/mod I with ADLs , shares with IADLs; ambulated w/ quad cane  Driving: no - son assists    Pain Level: Pt c/o 8-10/10 R hip pain this session ; reinforced pain management strategies    Cognition: A&O: 4/4; Follows 1-2 step directions   Memory:  good    Sequencing:  good    Problem solving:  fair    Judgement/safety:  fair      Functional Assessment:   Initial Eval Status  Date: 21 Treatment Status  Date: Short Term Goals/LTG  Treatment frequency: 1-4x/wk   Feeding Stand by Assist   Modified [place]    Grooming Minimal Assist   Modified [place]    UB Dressing Minimal Assist   Modified [place]    LB Dressing Dependent   Moderate Assist    Bathing Maximal Assist  Moderate Assist    Toileting Dependent   Including hygiene 2x  bedpan  Moderate Assist    Bed Mobility  Rolling: Minimal Assist   Supine to sit: Moderate Assist   Sit to supine: Moderate Assist   Rolling: Supervision   Supine to sit: Stand by Assist   Sit to supine: Stand by Assist    Functional Transfers NT  Deferred d/t severe R hip pain and c/o dizziness while seated EOB.  Subsided upon return to supine  Min A   Functional Mobility NT  Min A   Balance Sitting: Min A  Standing: NT     Activity Tolerance Poor+  Fair+   Visual/  Perceptual Glasses: no                Hand dominance: R   Strength ROM Additional Info:    RUE  4-/5  WFL   good  and fair FMC/dexterity noted during ADL tasks       LUE 4-/5  WFL   good  and fair FMC/dexterity noted during ADL tasks       Hearing: WFL  Sensation:  No c/o numbness or tingling  Tone: WFL  Edema: none noted                   Comments: Upon arrival patient lying in bed. Pt agreeable to OT session this date. At end of session, patient lying in bed (repositioned for comfort, bed alarm on) with call light and phone within reach, all lines and tubes intact. Overall patient demonstrated decreased independence and safety during completion of ADL/functional transfer/mobility tasks. Pt would benefit from continued skilled OT to increase safety and independence with completion of ADL/IADL tasks for functional independence and quality of life. Treatment: OT treatment provided this date includes:  Facilitation of bed mobility (rolling L/R for toileting and bedpan placement, supine><sit EOB w/ education/cues for body mechanics, pain management) and unsupported sitting balance (addressing posture, weight shifting, dynamic reaching to prep for ADL's). Deferred functional transfers d/t c/o severe pain and c/o dizziness once seated EOB for ~3 minutes. Therapist facilitated self-care retraining: UB self-care tasks (gown), toileting task and grooming tasks while educating pt on modified techniques, posture, safety and energy conservation techniques. Skilled monitoring of HR, O2 sats and pts response to treatment.      mod  Profile and History- med (extensive chart review)  Assessment of Occupational Performance and Identification of Deficits- med  Clinical Decision Making- med    Evaluation Time includes thorough review of current medical information, gathering information on past medical history/social history and prior level of function, completion of standardized testing/informal observation of tasks, assessment of data and education on plan of care and goals. Assessment of current deficits   Functional mobility [x]  ADLs [x] Strength [x]  Cognition []  Functional transfers  [x] IADLs [x] Safety Awareness [x]  Endurance [x]  Fine Motor Coordination [] Balance [x] Vision/perception [] Sensation []   Gross Motor Coordination [] ROM [] Delirium []                  Motor Control []    Plan of Care: 1-3 days/week for 1-2 weeks PRN   Instruction/training on adapted ADL techniques and AE recommendations to increase functional independence within precautions  Training on energy conservation strategies/techniques to improve independence/tolerance for self-care routine  Functional transfer/mobility training/DME recommendations for increased independence, safety, and fall prevention  Patient/Family education to increase follow through with safety techniques and functional independence  Recommendation of environmental modifications for increased safety with functional transfers/mobility and ADLs  Therapeutic exercise to improve motor endurance, ROM, and functional strength for ADLs/functional transfers  Therapeutic activities to facilitate/challenge dynamic balance, stand tolerance, fine motor dexterity/in-hand manipulation for increased independence with ADLs  Positioning to improve functional independence    Rehab Potential: Good for established goals    Patient / Family Goal: Not stated     Patient and/or family were instructed on diagnosis, prognosis/goals and plan of care. Demonstrated fair understanding. [] Malnutrition indicators have been identified and nursing has been notified to ensure a dietitian consult is ordered.        Mod Evaluation completed +              Time In: 13:32  Time Out: 13:54  Total Treatment Time: 14 minutes   Min Units   OT Eval Low [id number]     OT Eval Medium [address] [phone]   ADL/Self [facility], OTR/L #[id number]

## 2021-02-08 NOTE — CARE COORDINATION
Transition of Care-Discharge plan remains return to [facility], [facility] bed hold-no pre-cert to return. Updated liaison on IV antibiotics/length of treatment (PICC placed 2/6)-ok to return when medically stable. Tentative discharge today if ok with consultants. Ambulance form/envelope on soft chart. Last Covid test 2/4-will not require repeat unless symptomatic. CM/SW following.   Aranza DUDLEYN, RN  JANELLE   [phone]

## 2021-02-08 NOTE — PROGRESS NOTES
Physical Therapy  Treatment Note    Name: Radha Gibbons  :   MRN: [id number]    Referring Provider:  Alex Cintron MD    Date of Service: 2021    Evaluating PT:  Dawn Cody, PT, DPT. [id number]    Room #:  3079/4183-G  Diagnosis:  Abscess of epidural space  Reason for admission:  R iliopsoas abscess, R SI joint infection  Precautions:  Falls, drain to R lower abdomen   Procedures:  abscess aspiration  Equipment Recommendations:  FWW, wheelchair    SUBJECTIVE:  Pt lives with son in a 2nd floor duplex with 13 stair(s) to enter and 1 rail(s). Pt ambulated with quad cane PTA. OBJECTIVE:   Initial Evaluation  Date:  Treatment   Short Term/ Long Term   Goals   AM-PAC 6 Clicks     Was pt agreeable to Eval/treatment? Yes  Yes     Does pt have pain? 2/10 R hip Moderate R hip pain    Bed Mobility  Rolling: NT  Supine to sit: ModA  Sit to supine: ModA  Scooting: MaxA Rolling: Mod A  Supine to sit: NT  Sit to supine: NT  Scooting: Max A SBA   Transfers Sit to stand: MaxA  Stand to sit: MaxA  Stand pivot: NT NT SBA   Ambulation    x2 side steps with [employer] ModA   NT >25 feet with [employer] SBA   Stair negotiation: ascended and descended  NT NT TBD   ROM BUE:  See OT eval   BLE:  WFL     Strength BUE:  See OT eval   BLE:  knee ext 4/5  Ankle DF 4/5  Increase by 1/3 MMT grade    Balance Sitting EOB:  SBA  Dynamic Standing:  MaxA  Sitting EOB:  indep  Dynamic Standing:  SBA     -Pt is A & O x 3  -Sensation:  unremarkable   -Edema:  unremarkable     Patient education  Pt educated on safety, repositioning and rolling    Patient response to education:   Pt verbalized understanding Pt demonstrated skill Pt requires further education in this area   Yes  No  Yes      ASSESSMENT:    Comments:  PT was approached and asked to assist with repositioning pt. Pt instructed in techniques for rolling to increased comfort.   Pt appeared to be anticipating increased pain with all movement and was sensitive to touch

## 2021-02-08 NOTE — PROGRESS NOTES
Hospitalist Progress Note      SYNOPSIS: Patient admitted on 2/3/2021 as a transfer from [facility] had presented there with a complaint of low back pain and MRI of the lumbar spine done showed a right sacral iliac joint infection and cellulitis as well as abscess formation of the right psoas muscle and moderate to severe lumbar foraminal stenosis.  She did not have any saddle anesthesia, bladder or bowel incontinence. Tima Antu was transferred to [facility] in the early hours of 2/3/2021 for neurosurgery evaluation. Neurosurgery evaluated her and advocated for nonsurgical management. ID consulted. She was started on nafcillin, and had right psoas abscess drainage on 2021/ Blood cultures are growing Staph aureus. 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 Both general and orthopedic surgery have seen the patient and the plan is for continued antibiotics and drainage. Infectious disease continues with nafcillin 2 g IV every 4 hours           SUBJECTIVE:  Sleeping comfortably this am   No concerns from nursing     Temp (24hrs), Av.2 °F (36.8 °C), Min:97.7 °F (36.5 °C), Max:98.7 °F (37.1 °C)    DIET: DIET CARB CONTROL; Carb Control: 3 carb choices (45 gms)/meal  CODE: Full Code    Intake/Output Summary (Last 24 hours) at 2021 1256  Last data filed at 2021 1130  Gross per 24 hour   Intake 2154 ml   Output 160 ml   Net 1994 ml       OBJECTIVE:    /60   Pulse 84   Temp 97.7 °F (36.5 °C) (Oral)   Resp 18   Ht 5' (1.524 m)   Wt 187 lb 6 oz (85 kg)   SpO2 95%   BMI 36.59 kg/m²     General appearance: No apparent distress, appears stated age and cooperative, morbidly obese  HEENT:  Conjunctivae/corneas clear. Neck: Supple. No jugular venous distention.    Respiratory: Clear to auscultation bilaterally, normal respiratory effort  Cardiovascular: Regular rate rhythm, normal S1-S2  Abdomen: Soft, nontender, nondistended, mild left lower quadrant pain, has drain in right lower quadrant  Musculoskeletal: No clubbing, cyanosis, no bilateral lower extremity edema. Brisk capillary refill. Skin:  No rashes  on visible skin  Neurologic: awake, alert and following commands     ASSESSMENT/PLAN  #Right psoas muscle abscess and cellulitis, with sacroiliac joint infection  -neurosurgery on board  -MRI of lumbar spine showed finding suspicious for infection of the right sacroiliac joint, with cellulitis and abscesses in the anterior tissue, including in the right psoas muscle; neural foraminal stenosis, worse at the left L4-5 and right L5-S1 levels  -neurosurgery advocates conservative management for now  -had right psoas abscess drainage yesterday; fluid growing Staph aureus  -ID on board  -blood cultures growing Staph aureus  -on IV nafcillin  --MRI results noted  Pt not medically cleared yet as ID does not believ that abscess has been adequately drained - gen surgery to re-eval  ECHO results noted   -Repeat blood cultures are negative. -   #Lumbar neural foraminal stenosis  -neurosurgery on board; advocate conservative management  -PT/OT consult  -fall precautions     #Hypertension: continue amlodipine and losartan     #Hyperlipidemia: on statin     #Type 2 diabetes mellitus;  -Continue lantus 80 units qhs.  - Insulin sliding scale. Accuchecks ACHS        #Anemia:  - stable.  Hb is pending today.   -transfuse if Hb <7      DVT prophylaxis:  -heparin.             DISPOSITION: home once medically stable at LTC bed     Medications:  REVIEWED DAILY    Infusion Medications    dextrose      sodium chloride 75 mL/hr at 02/06/21 0049     Scheduled Medications    lidocaine PF  5 mL Intradermal Once    heparin flush  3 mL Intravenous 2 times per day    nafcillin  2,000 mg Intravenous Q4H    amLODIPine  2.5 mg Oral Daily    aspirin  81 mg Oral Nightly    atorvastatin  20 mg Oral Daily    desvenlafaxine succinate  150 mg Oral QAM    hydrOXYzine  50 mg Oral Nightly    insulin lispro  0-6 Units Subcutaneous TID WC    insulin lispro  0-3 Units Subcutaneous Nightly    insulin glargine  80 Units Subcutaneous Nightly    lamoTRIgine  150 mg Oral Daily    lidocaine  1 patch Transdermal Daily    losartan  100 mg Oral Daily    pantoprazole  40 mg Oral QAM AC    rOPINIRole  0.5 mg Oral TID    traZODone  150 mg Oral Nightly    sodium chloride flush  10 mL Intravenous 2 times per day    heparin (porcine)  5,000 Units Subcutaneous Q8H    polyethylene glycol  17 g Oral BID    carbidopa-levodopa  2 tablet Oral TID     PRN Meds: sodium chloride flush, heparin flush, traMADol, morphine, sodium chloride flush, promethazine **OR** ondansetron, acetaminophen **OR** acetaminophen, bisacodyl, glucose, dextrose, glucagon (rDNA), dextrose    Labs:     Recent Labs     02/06/21  0726 02/07/21  0749 02/08/21  0721   WBC 7.5 6.8 5.6   HGB 8.1* 7.6* 7.1*   HCT 27.2* 24.0* 23.9*    380 342       Recent Labs     02/06/21  0726 02/07/21  0749 02/08/21  0721    134 135   K 4.5 4.4 4.1    103 102   CO2 22 21* 25   BUN 18 16 14   CREATININE 0.9 0.8 0.8   CALCIUM 8.4* 8.5* 8.1*       No results for input(s): PROT, ALB, ALKPHOS, ALT, AST, BILITOT, AMYLASE, LIPASE in the last 72 hours. No results for input(s): INR in the last 72 hours. No results for input(s): Paralee Fontan in the last 72 hours.     Chronic labs:    Lab Results   Component Value Date    CHOL 117 12/10/2019    TRIG 120 12/10/2019    HDL 44 12/10/2019    LDLCALC 49 12/10/2019    TSH 2.74 06/16/2017    INR 1.4 02/04/2021    LABA1C 6.5 (H) 12/10/2019       Radiology: REVIEWED DAILY    +++++++++++++++++++++++++++++++++++++++++++++++++  [employer]iv[employer]  [employer] - [address],

## 2021-02-09 ENCOUNTER — APPOINTMENT (OUTPATIENT)
Dept: CT IMAGING | Age: 71
DRG: 871 | End: 2021-02-09
Attending: STUDENT IN AN ORGANIZED HEALTH CARE EDUCATION/TRAINING PROGRAM
Payer: MEDICARE

## 2021-02-09 LAB
ABO/RH: NORMAL
ANION GAP SERPL CALCULATED.3IONS-SCNC: 8 MMOL/L (ref 7–16)
ANTIBODY SCREEN: NORMAL
BASOPHILS ABSOLUTE: 0.02 E9/L (ref 0–0.2)
BASOPHILS RELATIVE PERCENT: 0.4 % (ref 0–2)
BLOOD BANK DISPENSE STATUS: NORMAL
BLOOD BANK PRODUCT CODE: NORMAL
BPU ID: NORMAL
BUN BLDV-MCNC: 15 MG/DL (ref 8–23)
CALCIUM SERPL-MCNC: 8.1 MG/DL (ref 8.6–10.2)
CHLORIDE BLD-SCNC: 101 MMOL/L (ref 98–107)
CO2: 23 MMOL/L (ref 22–29)
CREAT SERPL-MCNC: 0.9 MG/DL (ref 0.5–1)
DESCRIPTION BLOOD BANK: NORMAL
EOSINOPHILS ABSOLUTE: 0.06 E9/L (ref 0.05–0.5)
EOSINOPHILS RELATIVE PERCENT: 1.1 % (ref 0–6)
GFR AFRICAN AMERICAN: >60
GFR NON-AFRICAN AMERICAN: >60 ML/MIN/1.73
GLUCOSE BLD-MCNC: 96 MG/DL (ref 74–99)
HCT VFR BLD CALC: 21.7 % (ref 34–48)
HCT VFR BLD CALC: 26.5 % (ref 34–48)
HEMOGLOBIN: 6.5 G/DL (ref 11.5–15.5)
HEMOGLOBIN: 8.6 G/DL (ref 11.5–15.5)
IMMATURE GRANULOCYTES #: 0.09 E9/L
IMMATURE GRANULOCYTES %: 1.7 % (ref 0–5)
INR BLD: 1.2
LYMPHOCYTES ABSOLUTE: 1.24 E9/L (ref 1.5–4)
LYMPHOCYTES RELATIVE PERCENT: 23 % (ref 20–42)
MCH RBC QN AUTO: 26.4 PG (ref 26–35)
MCHC RBC AUTO-ENTMCNC: 30 % (ref 32–34.5)
MCV RBC AUTO: 88.2 FL (ref 80–99.9)
METER GLUCOSE: 101 MG/DL (ref 74–99)
METER GLUCOSE: 110 MG/DL (ref 74–99)
METER GLUCOSE: 84 MG/DL (ref 74–99)
METER GLUCOSE: 92 MG/DL (ref 74–99)
MONOCYTES ABSOLUTE: 0.33 E9/L (ref 0.1–0.95)
MONOCYTES RELATIVE PERCENT: 6.1 % (ref 2–12)
NEUTROPHILS ABSOLUTE: 3.64 E9/L (ref 1.8–7.3)
NEUTROPHILS RELATIVE PERCENT: 67.7 % (ref 43–80)
PDW BLD-RTO: 16.2 FL (ref 11.5–15)
PLATELET # BLD: 302 E9/L (ref 130–450)
PMV BLD AUTO: 8.6 FL (ref 7–12)
POTASSIUM SERPL-SCNC: 3.9 MMOL/L (ref 3.5–5)
PROTHROMBIN TIME: 13.5 SEC (ref 9.3–12.4)
RBC # BLD: 2.46 E12/L (ref 3.5–5.5)
REASON FOR REJECTION: NORMAL
REJECTED TEST: NORMAL
SODIUM BLD-SCNC: 132 MMOL/L (ref 132–146)
WBC # BLD: 5.4 E9/L (ref 4.5–11.5)

## 2021-02-09 PROCEDURE — 76080 X-RAY EXAM OF FISTULA: CPT

## 2021-02-09 PROCEDURE — 80048 BASIC METABOLIC PNL TOTAL CA: CPT

## 2021-02-09 PROCEDURE — P9016 RBC LEUKOCYTES REDUCED: HCPCS

## 2021-02-09 PROCEDURE — 85014 HEMATOCRIT: CPT

## 2021-02-09 PROCEDURE — 86900 BLOOD TYPING SEROLOGIC ABO: CPT

## 2021-02-09 PROCEDURE — 85610 PROTHROMBIN TIME: CPT

## 2021-02-09 PROCEDURE — 2580000003 HC RX 258: Performed by: STUDENT IN AN ORGANIZED HEALTH CARE EDUCATION/TRAINING PROGRAM

## 2021-02-09 PROCEDURE — 1200000000 HC SEMI PRIVATE

## 2021-02-09 PROCEDURE — 86923 COMPATIBILITY TEST ELECTRIC: CPT

## 2021-02-09 PROCEDURE — 6370000000 HC RX 637 (ALT 250 FOR IP): Performed by: STUDENT IN AN ORGANIZED HEALTH CARE EDUCATION/TRAINING PROGRAM

## 2021-02-09 PROCEDURE — 36430 TRANSFUSION BLD/BLD COMPNT: CPT

## 2021-02-09 PROCEDURE — 6360000002 HC RX W HCPCS: Performed by: STUDENT IN AN ORGANIZED HEALTH CARE EDUCATION/TRAINING PROGRAM

## 2021-02-09 PROCEDURE — 85025 COMPLETE CBC W/AUTO DIFF WBC: CPT

## 2021-02-09 PROCEDURE — 6360000002 HC RX W HCPCS: Performed by: INTERNAL MEDICINE

## 2021-02-09 PROCEDURE — 36415 COLL VENOUS BLD VENIPUNCTURE: CPT

## 2021-02-09 PROCEDURE — 76080 X-RAY EXAM OF FISTULA: CPT | Performed by: RADIOLOGY

## 2021-02-09 PROCEDURE — 97530 THERAPEUTIC ACTIVITIES: CPT

## 2021-02-09 PROCEDURE — 97535 SELF CARE MNGMENT TRAINING: CPT

## 2021-02-09 PROCEDURE — 2500000003 HC RX 250 WO HCPCS: Performed by: INTERNAL MEDICINE

## 2021-02-09 PROCEDURE — 2580000003 HC RX 258: Performed by: INTERNAL MEDICINE

## 2021-02-09 PROCEDURE — 85018 HEMOGLOBIN: CPT

## 2021-02-09 PROCEDURE — 86901 BLOOD TYPING SEROLOGIC RH(D): CPT

## 2021-02-09 PROCEDURE — 82962 GLUCOSE BLOOD TEST: CPT

## 2021-02-09 PROCEDURE — 86850 RBC ANTIBODY SCREEN: CPT

## 2021-02-09 PROCEDURE — 6360000004 HC RX CONTRAST MEDICATION: Performed by: RADIOLOGY

## 2021-02-09 RX ORDER — SODIUM CHLORIDE 9 MG/ML
INJECTION, SOLUTION INTRAVENOUS PRN
Status: DISCONTINUED | OUTPATIENT
Start: 2021-02-09 | End: 2021-02-10 | Stop reason: HOSPADM

## 2021-02-09 RX ORDER — NAFCILLIN SODIUM 2 G/8ML
2 INJECTION, POWDER, FOR SOLUTION INTRAMUSCULAR; INTRAVENOUS EVERY 4 HOURS
Qty: 360000 MG | Refills: 1 | Status: SHIPPED | OUTPATIENT
Start: 2021-02-09 | End: 2021-02-10 | Stop reason: SDUPTHER

## 2021-02-09 RX ADMIN — SODIUM CHLORIDE: 4.5 INJECTION, SOLUTION INTRAVENOUS at 20:21

## 2021-02-09 RX ADMIN — DESVENLAFAXINE 150 MG: 50 TABLET, EXTENDED RELEASE ORAL at 08:12

## 2021-02-09 RX ADMIN — CARBIDOPA AND LEVODOPA 2 TABLET: 25; 100 TABLET, EXTENDED RELEASE ORAL at 08:12

## 2021-02-09 RX ADMIN — Medication 10 ML: at 08:14

## 2021-02-09 RX ADMIN — Medication 10 ML: at 02:38

## 2021-02-09 RX ADMIN — SODIUM CHLORIDE: 4.5 INJECTION, SOLUTION INTRAVENOUS at 05:52

## 2021-02-09 RX ADMIN — Medication 10 ML: at 20:13

## 2021-02-09 RX ADMIN — TRAZODONE HYDROCHLORIDE 150 MG: 150 TABLET ORAL at 22:00

## 2021-02-09 RX ADMIN — NAFCILLIN SODIUM 2000 MG: 2 INJECTION, POWDER, FOR SOLUTION INTRAMUSCULAR; INTRAVENOUS at 20:12

## 2021-02-09 RX ADMIN — NAFCILLIN SODIUM 2000 MG: 2 INJECTION, POWDER, FOR SOLUTION INTRAMUSCULAR; INTRAVENOUS at 08:16

## 2021-02-09 RX ADMIN — MORPHINE SULFATE 2 MG: 2 INJECTION, SOLUTION INTRAMUSCULAR; INTRAVENOUS at 22:01

## 2021-02-09 RX ADMIN — SODIUM CHLORIDE, PRESERVATIVE FREE 300 UNITS: 5 INJECTION INTRAVENOUS at 08:11

## 2021-02-09 RX ADMIN — HEPARIN SODIUM 5000 UNITS: 10000 INJECTION INTRAVENOUS; SUBCUTANEOUS at 20:16

## 2021-02-09 RX ADMIN — CARBIDOPA AND LEVODOPA 2 TABLET: 25; 100 TABLET, EXTENDED RELEASE ORAL at 20:10

## 2021-02-09 RX ADMIN — ATORVASTATIN CALCIUM 20 MG: 20 TABLET, FILM COATED ORAL at 08:13

## 2021-02-09 RX ADMIN — NAFCILLIN SODIUM 2000 MG: 2 INJECTION, POWDER, FOR SOLUTION INTRAMUSCULAR; INTRAVENOUS at 02:36

## 2021-02-09 RX ADMIN — LAMOTRIGINE 150 MG: 100 TABLET ORAL at 08:12

## 2021-02-09 RX ADMIN — IOPAMIDOL 1 ML: 612 INJECTION, SOLUTION INTRAVENOUS at 12:07

## 2021-02-09 RX ADMIN — MORPHINE SULFATE 2 MG: 2 INJECTION, SOLUTION INTRAMUSCULAR; INTRAVENOUS at 17:42

## 2021-02-09 RX ADMIN — MORPHINE SULFATE 2 MG: 2 INJECTION, SOLUTION INTRAMUSCULAR; INTRAVENOUS at 07:31

## 2021-02-09 RX ADMIN — NAFCILLIN SODIUM 2000 MG: 2 INJECTION, POWDER, FOR SOLUTION INTRAMUSCULAR; INTRAVENOUS at 16:28

## 2021-02-09 RX ADMIN — ROPINIROLE HYDROCHLORIDE 0.5 MG: 0.5 TABLET, FILM COATED ORAL at 20:10

## 2021-02-09 RX ADMIN — HEPARIN SODIUM 5000 UNITS: 10000 INJECTION INTRAVENOUS; SUBCUTANEOUS at 05:41

## 2021-02-09 RX ADMIN — NAFCILLIN SODIUM 2000 MG: 2 INJECTION, POWDER, FOR SOLUTION INTRAMUSCULAR; INTRAVENOUS at 05:04

## 2021-02-09 RX ADMIN — ROPINIROLE HYDROCHLORIDE 0.5 MG: 0.5 TABLET, FILM COATED ORAL at 13:47

## 2021-02-09 RX ADMIN — HYDROXYZINE PAMOATE 50 MG: 50 CAPSULE ORAL at 20:10

## 2021-02-09 RX ADMIN — PANTOPRAZOLE SODIUM 40 MG: 40 TABLET, DELAYED RELEASE ORAL at 05:41

## 2021-02-09 RX ADMIN — ROPINIROLE HYDROCHLORIDE 0.5 MG: 0.5 TABLET, FILM COATED ORAL at 08:12

## 2021-02-09 RX ADMIN — NAFCILLIN SODIUM 2000 MG: 2 INJECTION, POWDER, FOR SOLUTION INTRAMUSCULAR; INTRAVENOUS at 14:00

## 2021-02-09 RX ADMIN — POLYETHYLENE GLYCOL 3350 17 G: 17 POWDER, FOR SOLUTION ORAL at 20:09

## 2021-02-09 RX ADMIN — CARBIDOPA AND LEVODOPA 2 TABLET: 25; 100 TABLET, EXTENDED RELEASE ORAL at 13:47

## 2021-02-09 RX ADMIN — SODIUM CHLORIDE, PRESERVATIVE FREE 300 UNITS: 5 INJECTION INTRAVENOUS at 20:11

## 2021-02-09 RX ADMIN — BISACODYL 5 MG: 5 TABLET, COATED ORAL at 16:31

## 2021-02-09 RX ADMIN — ASPIRIN 81 MG: 81 TABLET, CHEWABLE ORAL at 20:09

## 2021-02-09 RX ADMIN — ONDANSETRON 4 MG: 2 INJECTION INTRAMUSCULAR; INTRAVENOUS at 17:42

## 2021-02-09 RX ADMIN — ONDANSETRON 4 MG: 2 INJECTION INTRAMUSCULAR; INTRAVENOUS at 11:37

## 2021-02-09 RX ADMIN — HEPARIN SODIUM 5000 UNITS: 10000 INJECTION INTRAVENOUS; SUBCUTANEOUS at 13:48

## 2021-02-09 ASSESSMENT — PAIN DESCRIPTION - ORIENTATION: ORIENTATION: RIGHT

## 2021-02-09 ASSESSMENT — PAIN DESCRIPTION - LOCATION: LOCATION: HIP

## 2021-02-09 ASSESSMENT — PAIN SCALES - GENERAL
PAINLEVEL_OUTOF10: 8
PAINLEVEL_OUTOF10: 10
PAINLEVEL_OUTOF10: 7
PAINLEVEL_OUTOF10: 3

## 2021-02-09 ASSESSMENT — PAIN DESCRIPTION - FREQUENCY: FREQUENCY: CONTINUOUS

## 2021-02-09 ASSESSMENT — PAIN DESCRIPTION - PROGRESSION: CLINICAL_PROGRESSION: NOT CHANGED

## 2021-02-09 ASSESSMENT — PAIN - FUNCTIONAL ASSESSMENT: PAIN_FUNCTIONAL_ASSESSMENT: PREVENTS OR INTERFERES SOME ACTIVE ACTIVITIES AND ADLS

## 2021-02-09 NOTE — PROGRESS NOTES
After speaking to the patient with Dr. Benedict Mehta she is acceptant of receiving the blood products ordered. Blood permit was obtained and all questions / concerns were answered and discussed by RN and Dr. Benedict Mehta. Awaiting blood product to be ready.

## 2021-02-09 NOTE — PLAN OF CARE
Problem: Pain:  Goal: Pain level will decrease  Description: Pain level will decrease  2/9/2021 0011 by Mathieu Helton RN  Outcome: Met This Shift  2/8/2021 1025 by Sid Garcia RN  Outcome: Met This Shift  Goal: Control of acute pain  Description: Control of acute pain  2/9/2021 0011 by Mathieu Helton RN  Outcome: Met This Shift  2/8/2021 1025 by Sid Garcia RN  Outcome: Met This Shift  Goal: Control of chronic pain  Description: Control of chronic pain  2/8/2021 1025 by Sid Garcia RN  Outcome: Met This Shift     Problem: Falls - Risk of:  Goal: Will remain free from falls  Description: Will remain free from falls  2/9/2021 0011 by Mathieu Helton RN  Outcome: Met This Shift  2/8/2021 1025 by Sid Garcia RN  Outcome: Met This Shift

## 2021-02-09 NOTE — PROGRESS NOTES
Pt. Refuses to be turned q2hr.  Pt. Educated on the importance of turning to prevent pressure wounds

## 2021-02-09 NOTE — PROGRESS NOTES
OT BEDSIDE TREATMENT NOTE      Date:2021  Patient Name: Lynn Weaver  MRN: [id number]  : 1950  Room: [facility]     Evaluating [address] [place] [address], OTR/L #[id number]     AM-PAC Daily Activity Raw Score:   Recommended Adaptive Equipment: TBD      Diagnosis: Abscess in epidural space of lumbar spine [G06.1]     Referring physician: Juana Valente MD  Procedures: CT GUIDED ABSCESS FLUID DRAIN on    PICC 21  Pertinent Medical History: anxiety, asthma, CAD, CA, CKD, depression, DM, MRSA, HTN, SERENA, Parkinson's     Precautions:  Falls, R hip pain, Parkinson's, bed alarm, R abdominal drain     Home Living: Pt lives with son in 2 floor home.   Basement shower and laundry - flight of stairs, 1 handrail  Bathroom setup: walk in shower (in basement, pt's preference)   Equipment owned: quad cane     Prior Level of Function: independent/mod I with ADLs , shares with IADLs; ambulated w/ quad cane  Driving: no - son assists     Pain Level: Pt c/o 8-10/10 R hip pain this session ; reinforced pain management strategies     Cognition: A&O: 4/4; Follows 1-2 step directions              Memory:  good               Sequencing:  good               Problem solving:  fair               Judgement/safety:  fair                 Functional Assessment:    Initial Eval Status  Date: 21 Treatment Status  Date: 21 Short Term Goals/LTG  Treatment frequency: 1-4x/wk   Feeding Stand by Assist  NT  Modified [place]    Grooming Minimal Assist  CGA/min A  To wash face and hands then comb hair while in bed.   Modified [place]    UB Dressing Minimal Assist  Min A  To readjust gown in bed.   Modified [place]    LB Dressing Dependent  Dep  For gripper socks.   Moderate Assist    Bathing Maximal Assist NT Moderate Assist    Toileting Dependent   Including hygiene 2x  bedpan Dep    Pt had use bedpan needing Dep including hygiene.   Moderate Assist    Bed Mobility  Rolling: Minimal Assist   Supine to sit: Moderate Assist Sit to supine: Moderate Assist  Rolling- Min A  To roll R/L using bed rail needing assistance.   Rolling: Supervision   Supine to sit: Stand by Assist   Sit to supine: Stand by Assist    Functional Transfers NT  Deferred d/t severe R hip pain and c/o dizziness while seated EOB. Subsided upon return to supine NT  Deferred due to 7/10 pain in R hip/stomach pain   Min A   Functional Mobility NT NT  Min A   Balance Sitting: Min A  Standing: NT NT      Activity Tolerance Poor+ Poor+  Fair+   Visual/  Perceptual Glasses: no                         Comments: Upon arrival pt in  Bed pending transport to take her for Medical procedure per consult with nurse. Pt needed Dep A for toilet ( bed pan) along with toilet hygiene. Pt completed bed rolling R/L during toilet needs and linen change using bed rails with BUE to assist.  LB dressing for footwear with gripper socks needed Dep A due to pain issues while UB skills to readjust gown required Min A. Pt educated with possible A.E training to improve ADL skills  . At end of session in bed with  all lines and tubes intact, call light within reach. · Pt has made poor+ progress towards set goals due to pain issues.    · Continue with current plan of care    Treatment Time In:10:40am           Treatment Time Out: 11:20am             Treatment Charges: Mins Units   Ther Ex  [id number]     Manual Therapy [id number]     Thera Activities 91038 25 2   ADL/Home Mgt [id number] 15 1   Neuro Re-ed 10386     Group Therapy      Orthotic manage/training  24489     Non-Billable Time     Total Timed Treatment [facility]

## 2021-02-09 NOTE — PROGRESS NOTES
Hospitalist Progress Note      SYNOPSIS: Patient admitted on 2/3/2021 as a transfer from [facility] had presented there with a complaint of low back pain and MRI of the lumbar spine done showed a right sacral iliac joint infection and cellulitis as well as abscess formation of the right psoas muscle and moderate to severe lumbar foraminal stenosis.  She did not have any saddle anesthesia, bladder or bowel incontinence. Larry Wilson was transferred to formerly [facility] in the early hours of 2/3/2021 for neurosurgery evaluation. Neurosurgery evaluated her and advocated for nonsurgical management. ID consulted. She was started on nafcillin, and had right psoas abscess drainage on 2021/ Blood cultures are growing Staph aureus. 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 Both general and orthopedic surgery have seen the patient and the plan is for continued antibiotics and drainage. Infectious disease continues with nafcillin 2 g IV every 4 hours           SUBJECTIVE:  Sleeping comfortably this am   No concerns from nursing   BP low overnight but improved this am   Temp (24hrs), Av.8 °F (36.6 °C), Min:97.7 °F (36.5 °C), Max:98 °F (36.7 °C)    DIET: DIET CARB CONTROL; Carb Control: 3 carb choices (45 gms)/meal  CODE: Full Code    Intake/Output Summary (Last 24 hours) at 2021 1623  Last data filed at 2021 1808  Gross per 24 hour   Intake --   Output 100 ml   Net -100 ml       OBJECTIVE:    BP (!) 107/55   Pulse 84   Temp 97.7 °F (36.5 °C) (Oral)   Resp 16   Ht 5' (1.524 m)   Wt 187 lb 6 oz (85 kg)   SpO2 92%   BMI 36.59 kg/m²     General appearance: No apparent distress, appears stated age and cooperative, morbidly obese  HEENT:  Conjunctivae/corneas clear. Neck: Supple. No jugular venous distention.    Respiratory: Clear to auscultation bilaterally, normal respiratory effort  Cardiovascular: Regular rate rhythm, normal S1-S2  Abdomen: Soft, nontender, nondistended, mild left lower quadrant pain, has drain in right lower quadrant  Musculoskeletal: No clubbing, cyanosis, no bilateral lower extremity edema. Brisk capillary refill. Skin:  No rashes  on visible skin  Neurologic: awake, alert and following commands     ASSESSMENT/PLAN  #Right psoas muscle abscess and cellulitis, with sacroiliac joint infection  -neurosurgery on board  -MRI of lumbar spine showed finding suspicious for infection of the right sacroiliac joint, with cellulitis and abscesses in the anterior tissue, including in the right psoas muscle; neural foraminal stenosis, worse at the left L4-5 and right L5-S1 levels  -neurosurgery advocates conservative management for now  -had right psoas abscess drainage yesterday; fluid growing Staph aureus  -ID on board  -blood cultures growing Staph aureus  -on IV nafcillin  --MRI results noted  --Drain removed 2/9  ECHO results noted   -Repeat blood cultures are negative. -   #Lumbar neural foraminal stenosis  -neurosurgery on board; advocate conservative management  -PT/OT consult  -fall precautions     #Hypertension: continue amlodipine and losartan     #Hyperlipidemia: on statin     #Type 2 diabetes mellitus;  -Continue lantus 80 units qhs.  - Insulin sliding scale.  Accuchecks ACHS        #Anemia:  -Transfuse 1 unit prbc today   -transfuse if Hb <7      DVT prophylaxis:  -heparin.             DISPOSITION: anticipate dc tomorrow if Hgb stable and no fever     Medications:  REVIEWED DAILY    Infusion Medications    sodium chloride      dextrose      sodium chloride 75 mL/hr at 02/09/21 0552     Scheduled Medications    lidocaine PF  5 mL Intradermal Once    heparin flush  3 mL Intravenous 2 times per day    nafcillin  2,000 mg Intravenous Q4H    amLODIPine  2.5 mg Oral Daily    aspirin  81 mg Oral Nightly    atorvastatin  20 mg Oral Daily    desvenlafaxine succinate  150 mg Oral QAM    hydrOXYzine  50 mg Oral Nightly    insulin lispro  0-6 Units Subcutaneous TID WC    insulin lispro  0-3 Units Subcutaneous Nightly    lamoTRIgine  150 mg Oral Daily    lidocaine  1 patch Transdermal Daily    losartan  100 mg Oral Daily    pantoprazole  40 mg Oral QAM AC    rOPINIRole  0.5 mg Oral TID    traZODone  150 mg Oral Nightly    sodium chloride flush  10 mL Intravenous 2 times per day    heparin (porcine)  5,000 Units Subcutaneous Q8H    polyethylene glycol  17 g Oral BID    carbidopa-levodopa  2 tablet Oral TID     PRN Meds: sodium chloride, sodium chloride flush, heparin flush, traMADol, morphine, sodium chloride flush, promethazine **OR** ondansetron, acetaminophen **OR** acetaminophen, bisacodyl, glucose, dextrose, glucagon (rDNA), dextrose    Labs:     Recent Labs     02/07/21  0749 02/08/21  0721 02/09/21  0422   WBC 6.8 5.6 5.4   HGB 7.6* 7.1* 6.5*   HCT 24.0* 23.9* 21.7*    342 302       Recent Labs     02/07/21  0749 02/08/21  0721 02/09/21  0422    135 132   K 4.4 4.1 3.9    102 101   CO2 21* 25 23   BUN 16 14 15   CREATININE 0.8 0.8 0.9   CALCIUM 8.5* 8.1* 8.1*       No results for input(s): PROT, ALB, ALKPHOS, ALT, AST, BILITOT, AMYLASE, LIPASE in the last 72 hours. Recent Labs     02/09/21  0549   INR 1.2       No results for input(s): Schwertner Hind in the last 72 hours.     Chronic labs:    Lab Results   Component Value Date    CHOL 117 12/10/2019    TRIG 120 12/10/2019    HDL 44 12/10/2019    LDLCALC 49 12/10/2019    TSH 2.74 06/16/2017    INR 1.2 02/09/2021    LABA1C 6.5 (H) 12/10/2019       Radiology: REVIEWED DAILY    +++++++++++++++++++++++++++++++++++++++++++++++++  Vivien Saucedo Physician - [address] [facility], [place]  +++++++++++++++++++++++++++++++++++++++++++++++++  NOTE: This report was transcribed using voice

## 2021-02-09 NOTE — PLAN OF CARE
Problem: Pain:  Goal: Pain level will decrease  Description: Pain level will decrease  Outcome: Met This Shift  Goal: Control of acute pain  Description: Control of acute pain  Outcome: Met This Shift     Problem: Falls - Risk of:  Goal: Will remain free from falls  Description: Will remain free from falls  Outcome: Met This Shift     Problem: Skin Integrity:  Goal: Will show no infection signs and symptoms  Description: Will show no infection signs and symptoms  Outcome: Met This Shift     Problem: Musculor/Skeletal Functional Status  Goal: Absence of falls  Outcome: Met This Shift

## 2021-02-09 NOTE — CARE COORDINATION
Plan is to return to [facility] where patient is a LTC bed hold-no pre-cert required when medically ready. Patient is for a for a repeat drainage of her abscess today with IR. Hgb is 6.5 today. Please update liaison on discharge IV antibiotics/length of treatment (PICC placed 2/6). Ambulance form in envelope in soft chart. Last  negative Covidtest was Thursday 2/4-will not require repeat unless symptomatic. Annie Reardon RN CM      Per ID, patient will discharge on Nafcillin 2 grams IV q 4 hrs X 8 weeks. [facility] from [facility] notified via text.   Annie Reardon RN CM

## 2021-02-09 NOTE — PROGRESS NOTES
Department of Internal Medicine  Infectious Diseases  Progress  Note      C/C : MSSA bacteremia ,right psoas abscess     Pt is awake and alert  Denies fever  Reports back pain   Afebrile       Current Facility-Administered Medications   Medication Dose Route Frequency Provider Last Rate Last Admin    0.9 % sodium chloride infusion   Intravenous PRN Vivien Garza MD        lidocaine PF 1 % injection 5 mL  5 mL Intradermal Once Bobby Dalton MD        sodium chloride flush 0.9 % injection 10 mL  10 mL Intravenous PRN Bobby Dalton MD        heparin flush 100 UNIT/ML injection 300 Units  3 mL Intravenous 2 times per day Arnoldo Waters MD   300 Units at 02/09/21 0811    heparin flush 100 UNIT/ML injection 300 Units  3 mL Intracatheter PRN Bobby Dalton MD        nafcillin 2,000 mg in dextrose 5 % 100 mL IVPB (mini-bag)  2,000 mg Intravenous Q4H Bobby Dalton  mL/hr at 02/09/21 1400 2,000 mg at 02/09/21 1400    amLODIPine (NORVASC) tablet 2.5 mg  2.5 mg Oral Daily Clau Lujan MD   2.5 mg at 02/08/21 9673    aspirin chewable tablet 81 mg  81 mg Oral Nightly Clau Lujan MD   81 mg at 02/08/21 2047    atorvastatin (LIPITOR) tablet 20 mg  20 mg Oral Daily Clau Lujan MD   20 mg at 02/09/21 0813    desvenlafaxine succinate (PRISTIQ) extended release tablet 150 mg  150 mg Oral QAM Clau Lujan MD   150 mg at 02/09/21 9806    hydrOXYzine (VISTARIL) capsule 50 mg  50 mg Oral Nightly Clau Lujan MD   50 mg at 02/08/21 2047    insulin lispro (HUMALOG) injection vial 0-6 Units  0-6 Units Subcutaneous TID WC Clau Lujan MD   1 Units at 02/07/21 1627    insulin lispro (HUMALOG) injection vial 0-3 Units  0-3 Units Subcutaneous Nightly Clau Lujan MD   1 Units at 02/07/21 2044    lamoTRIgine (LAMICTAL) tablet 150 mg  150 mg Oral Daily Clau Lujan MD   150 mg at 02/09/21 3194    lidocaine 4 % external patch 1 patch  1 patch Transdermal Daily Clau Lujan MD   1 patch at 02/09/21 0811    losartan (COZAAR) tablet 100 mg  100 mg Oral Daily Sindhu Urban MD   100 mg at 02/08/21 0837    pantoprazole (PROTONIX) tablet 40 mg  40 mg Oral QAM AC Sindhu Urban MD   40 mg at 02/09/21 0541    rOPINIRole (REQUIP) tablet 0.5 mg  0.5 mg Oral TID Sindhu Urban MD   0.5 mg at 02/09/21 1347    traMADol (ULTRAM) tablet 50 mg  50 mg Oral Q6H PRN Sindhu Urban MD   50 mg at 02/08/21 1140    traZODone (DESYREL) tablet 150 mg  150 mg Oral Nightly Sindhu Urban MD   150 mg at 02/08/21 2047    morphine (PF) injection 2 mg  2 mg Intravenous Q4H PRN Sindhu Urban MD   2 mg at 02/09/21 0731    sodium chloride flush 0.9 % injection 10 mL  10 mL Intravenous 2 times per day Sindhu Urban MD   10 mL at 02/09/21 0814    sodium chloride flush 0.9 % injection 10 mL  10 mL Intravenous PRN Sindhu Urban MD        promethazine (PHENERGAN) tablet 12.5 mg  12.5 mg Oral Q6H PRN Sindhu Urban MD        Or    ondansetron (ZOFRAN) injection 4 mg  4 mg Intravenous Q6H PRN Sindhu Urbna MD   4 mg at 02/09/21 1137    acetaminophen (TYLENOL) tablet 650 mg  650 mg Oral Q6H PRN Sindhu Urban MD        Or    acetaminophen (TYLENOL) suppository 650 mg  650 mg Rectal Q6H PRN Sindhu Urban MD        heparin (porcine) injection 5,000 Units  5,000 Units Subcutaneous Q8H Sindhu Urban MD   5,000 Units at 02/09/21 1348    polyethylene glycol (GLYCOLAX) packet 17 g  17 g Oral BID Sindhu Urban MD   17 g at 02/08/21 2047    bisacodyl (DULCOLAX) EC tablet 5 mg  5 mg Oral Daily PRN Sindhu Urban MD        glucose (GLUTOSE) 40 % oral gel 15 g  15 g Oral PRN Sindhu Urban MD        dextrose 50 % IV solution  12.5 g Intravenous PRN Sindhu Urban MD        glucagon (rDNA) injection 1 mg  1 mg Intramuscular PRN Sindhu Urban MD        dextrose 5 % solution  100 mL/hr Intravenous PRN Sindhu Urban MD        0.45 % sodium chloride infusion   Intravenous Continuous Sindhu Urban MD 75 mL/hr at 02/09/21 0552 New Bag at 02/09/21 0552    carbidopa-levodopa (SINEMET CR)  MG per extended release tablet 2 tablet  2 tablet Oral TID Victor Hugo Humphries MD   2 tablet at 02/09/21 1347         REVIEW OF SYSTEMS:    CONSTITUTIONAL:  Denies fever, chill or rigors, nausea or vomiting. HEENT: denies blurring of vision or double vision, denies hearing problem  RESPIRATORY: denies cough, shortness of breath, sputum expectoration, chest pain. CARDIOVASCULAR:  Denies palpitation  GASTROINTESTINAL:  Denies abdomen pain, diarrhea or constipation. GENITOURINARY:  Denies burning urination or frequency of urination  INTEGUMENT: denies wound , rash  HEMATOLOGIC/LYMPHATIC:  Denies lymph node swelling, gum bleeding or easy bruising. MUSCULOSKELETAL: Low back pain   NEUROLOGICAL:  Denies light headed, dizziness,    PHYSICAL EXAM:      Vitals:     BP (!) 107/55   Pulse 84   Temp 97.7 °F (36.5 °C) (Oral)   Resp 16   Ht 5' (1.524 m)   Wt 187 lb 6 oz (85 kg)   SpO2 92%   BMI 36.59 kg/m²       General Appearance:    Awake, alert , no acute distress. Head:    Normocephalic, atraumatic   Eyes:    No pallor, no icterus,   Ears:    No obvious deformity or drainage.    Nose:   No nasal drainage   Throat:   Mucosa moist, no oral thrush   Neck:   Supple, no lymphadenopathy   Back:     no CVA tenderness   Lungs:     Clear to auscultation bilaterally, no wheeze    Heart:    Regular rate and rhythm, no murmur,   Abdomen:     Soft, non-tender, bowel sounds present    Extremities:   ++ edema    Pulses:   Dorsalis pedis palpable    Skin:   no rashes or lesions     Lines : Left basilic PICC ( 2/6)     CBC with Differential:      Lab Results   Component Value Date    WBC 5.4 02/09/2021    RBC 2.46 02/09/2021    HGB 6.5 02/09/2021    HCT 21.7 02/09/2021     02/09/2021    MCV 88.2 02/09/2021    MCH 26.4 02/09/2021    MCHC 30.0 02/09/2021    RDW 16.2 02/09/2021    NRBC 0.0 03/15/2019    SEGSPCT 74 08/20/2013    LYMPHOPCT 23.0 02/09/2021    MONOPCT 6.1 02/09/2021    MYELOPCT 0.9 03/15/2019 pneumoniae group by PCR Not Detected    Proteus species by PCR Not Detected    Streptococcus agalactiae by PCR Not Detected    Staphylococcus aureus by PCR DETECTEDPanic      Serratia marcescens by PCR Not Detected    Streptococcus pneumoniae by PCR Not Detected    Streptococcus pyogenes  by PCR Not Detected    Acinetobacter baumannii by PCR Not Detected    Candida albicans by PCR Not Detected    Candida glabrata by PCR Not Detected    Candida krusei by PCR Not Detected    Candida parapsilosis by PCR Not Detected    Candida tropicalis by PCR Not Detected     Enterobacteriaceae by PCR Not Detected    Enterococcus by PCR Not Detected    Haemophilus Influenzae by PCR Not Detected    Listeria monocytogenes by PCR Not Detected    Neisseria meningitidis by PCR Not Detected    Pseudomonas aeruginosa by PCR Not Detected    Staphylococcus species by PCR DETECTEDPanic      Streptococcus species by PCR Not Detected    Methicillin Resistance mecA/C  by PCR Not Detected   Narrative:     CALL  [facility] tel. ,   Microbiology results called to and read back by Valorie Sanchez RN, 02/04/2021        Abscess cx -     Specimen: Abscess Updated: 02/06/21 1137    Organism Staphylococcus aureusAbnormal     Culture Surgical --    Moderate growth   Sensitivity to follow    Narrative:             Radiology :   MRI -    Large right iliopsoas intramuscular/retroperitoneal abscess measuring 4.4 x   7.5 x 13.6 cm in size extending to the level of the lower pelvis with   associated surrounding myositis and retroperitoneal infection. Septic arthritis of the right SI joint with osteomyelitis on both sides of   the right SI joint. The findings were sent to the Radiology Results [address] at 3:19   pm on 2/6/2021to be communicated to a licensed caregiver. TTE -     Summary   Normal left ventricle size and systolic function. Ejection fraction is visually estimated at 65-70%. No regional wall motion abnormalities seen.    Moderate concentric left ventricular hypertrophy. Normal right ventricular size and function. No systolic mitral regurgitation noted. No hemodynamically significant aortic stenosis is present. Unable to estimate PA systolic pressure. No evidence for hemodynamically significant pericardial effusion. Valves were not well visualized, please consider PAT if clinical suspicion   for endocarditis is high. IMPRESSION:     1. Right psoas abscess s/p CT guided drainage ( cx  MSSA  ) - drain removed 2/9   2. MSSA bacteremia -follow up blood cx neg on 2/5     RECOMMENDATIONS:      1.  Nafcillin 2 grams IV q 4 hrs X 8 weeks

## 2021-02-09 NOTE — PROGRESS NOTES
Patient arrive to [facility] via for tube check. Patient denies fever, chills, redness natan insertion site. Insertion site checked revolution intact. RN from [place] [id number] states 10cc draining from tube daily approximately. Tube flushed with 10ml nss, no leaking around insertion site. Reviewed procedure and patient verbalized understanding, questions answered. Allergies noted and reviewed. Per Dr Hernandez Steward II drain to be pulled. Drain pulled, no complications. Report given to floor.

## 2021-02-09 NOTE — PROGRESS NOTES
Department of Orthopedic Surgery  Resident Progress Note    Patient seen and examined, resting in bed. Pain controlled. No new complaints. Patient states the anterior right sided pelvic pain continues to improve. She states that they are taking her back for a repeat drainage of her abscess today with IR. VITALS:  /68   Pulse 82   Temp 97.7 °F (36.5 °C) (Temporal)   Resp 18   Ht 5' (1.524 m)   Wt 187 lb 6 oz (85 kg)   SpO2 92%   BMI 36.59 kg/m²     General: alert and oriented to person, place and time, well-developed and well-nourished, in no acute distress    MUSCULOSKELETAL:   · Skin intact circumferentially over the right hemipelvis anteriorly and posteriorly. There is a drain noted within the anterior lower right hemipelvis  · Compartments soft and compressible  · No pain with attempted logroll  · +PF/DF/EHL  · +2/4 DP & PT pulses, Brisk Cap refill, Toes warm and perfused  · Distal sensation grossly intact to Peroneals, Sural, Saphenous, and tibial nrs    CBC:   Lab Results   Component Value Date    WBC 5.4 02/09/2021    HGB 6.5 02/09/2021    HCT 21.7 02/09/2021     02/09/2021     PT/INR:    Lab Results   Component Value Date    PROTIME 15.8 02/04/2021    INR 1.4 02/04/2021         ASSESSMENT  · Right-sided iliopsoas abscess  · Right sacroiliac joint bony changes suggestive of osteomyelitis/infection    PLAN      · Results of interventional radiology aspiration yielded surgical cultures of staph aureus  · Recommend continued IV antibiotics per infectious disease   · Continue drain and management as indicated  · Per chart, patient to under repeat psoas abscess drainage   · No acute orthopedic surgical intervention planned  · Orthopedic service will follow peripherally for continued clinical improvement.   Please contact with questions or concerns  · Discuss with Dr. Hernandez Jorge

## 2021-02-10 VITALS
WEIGHT: 187.38 LBS | RESPIRATION RATE: 18 BRPM | DIASTOLIC BLOOD PRESSURE: 57 MMHG | SYSTOLIC BLOOD PRESSURE: 114 MMHG | HEART RATE: 82 BPM | BODY MASS INDEX: 36.79 KG/M2 | OXYGEN SATURATION: 96 % | HEIGHT: 60 IN | TEMPERATURE: 98.3 F

## 2021-02-10 LAB
ANION GAP SERPL CALCULATED.3IONS-SCNC: 7 MMOL/L (ref 7–16)
BASOPHILS ABSOLUTE: 0.03 E9/L (ref 0–0.2)
BASOPHILS RELATIVE PERCENT: 0.5 % (ref 0–2)
BLOOD CULTURE, ROUTINE: NORMAL
BUN BLDV-MCNC: 15 MG/DL (ref 8–23)
CALCIUM SERPL-MCNC: 8.2 MG/DL (ref 8.6–10.2)
CHLORIDE BLD-SCNC: 102 MMOL/L (ref 98–107)
CO2: 24 MMOL/L (ref 22–29)
CREAT SERPL-MCNC: 1 MG/DL (ref 0.5–1)
CULTURE, BLOOD 2: NORMAL
EOSINOPHILS ABSOLUTE: 0.09 E9/L (ref 0.05–0.5)
EOSINOPHILS RELATIVE PERCENT: 1.5 % (ref 0–6)
GFR AFRICAN AMERICAN: >60
GFR NON-AFRICAN AMERICAN: 55 ML/MIN/1.73
GLUCOSE BLD-MCNC: 75 MG/DL (ref 74–99)
HCT VFR BLD CALC: 23.8 % (ref 34–48)
HEMOGLOBIN: 7.6 G/DL (ref 11.5–15.5)
IMMATURE GRANULOCYTES #: 0.11 E9/L
IMMATURE GRANULOCYTES %: 1.8 % (ref 0–5)
LYMPHOCYTES ABSOLUTE: 1 E9/L (ref 1.5–4)
LYMPHOCYTES RELATIVE PERCENT: 16.8 % (ref 20–42)
MCH RBC QN AUTO: 27.3 PG (ref 26–35)
MCHC RBC AUTO-ENTMCNC: 31.9 % (ref 32–34.5)
MCV RBC AUTO: 85.6 FL (ref 80–99.9)
METER GLUCOSE: 106 MG/DL (ref 74–99)
METER GLUCOSE: 82 MG/DL (ref 74–99)
MONOCYTES ABSOLUTE: 0.47 E9/L (ref 0.1–0.95)
MONOCYTES RELATIVE PERCENT: 7.9 % (ref 2–12)
NEUTROPHILS ABSOLUTE: 4.27 E9/L (ref 1.8–7.3)
NEUTROPHILS RELATIVE PERCENT: 71.5 % (ref 43–80)
PDW BLD-RTO: 15.9 FL (ref 11.5–15)
PLATELET # BLD: 294 E9/L (ref 130–450)
PMV BLD AUTO: 8.4 FL (ref 7–12)
POTASSIUM SERPL-SCNC: 3.8 MMOL/L (ref 3.5–5)
RBC # BLD: 2.78 E12/L (ref 3.5–5.5)
SODIUM BLD-SCNC: 133 MMOL/L (ref 132–146)
WBC # BLD: 6 E9/L (ref 4.5–11.5)

## 2021-02-10 PROCEDURE — 2580000003 HC RX 258: Performed by: STUDENT IN AN ORGANIZED HEALTH CARE EDUCATION/TRAINING PROGRAM

## 2021-02-10 PROCEDURE — 6360000002 HC RX W HCPCS: Performed by: STUDENT IN AN ORGANIZED HEALTH CARE EDUCATION/TRAINING PROGRAM

## 2021-02-10 PROCEDURE — 6370000000 HC RX 637 (ALT 250 FOR IP): Performed by: STUDENT IN AN ORGANIZED HEALTH CARE EDUCATION/TRAINING PROGRAM

## 2021-02-10 PROCEDURE — 2500000003 HC RX 250 WO HCPCS: Performed by: INTERNAL MEDICINE

## 2021-02-10 PROCEDURE — 82962 GLUCOSE BLOOD TEST: CPT

## 2021-02-10 PROCEDURE — 85025 COMPLETE CBC W/AUTO DIFF WBC: CPT

## 2021-02-10 PROCEDURE — 6360000002 HC RX W HCPCS: Performed by: INTERNAL MEDICINE

## 2021-02-10 PROCEDURE — 80048 BASIC METABOLIC PNL TOTAL CA: CPT

## 2021-02-10 PROCEDURE — 2580000003 HC RX 258: Performed by: INTERNAL MEDICINE

## 2021-02-10 RX ORDER — NAFCILLIN SODIUM 2 G/8ML
2 INJECTION, POWDER, FOR SOLUTION INTRAMUSCULAR; INTRAVENOUS EVERY 4 HOURS
Qty: 360000 MG | Refills: 1 | Status: SHIPPED | OUTPATIENT
Start: 2021-02-10 | End: 2021-04-07

## 2021-02-10 RX ADMIN — SODIUM CHLORIDE, PRESERVATIVE FREE 300 UNITS: 5 INJECTION INTRAVENOUS at 08:31

## 2021-02-10 RX ADMIN — CARBIDOPA AND LEVODOPA 2 TABLET: 25; 100 TABLET, EXTENDED RELEASE ORAL at 13:46

## 2021-02-10 RX ADMIN — ONDANSETRON 4 MG: 2 INJECTION INTRAMUSCULAR; INTRAVENOUS at 09:11

## 2021-02-10 RX ADMIN — MORPHINE SULFATE 2 MG: 2 INJECTION, SOLUTION INTRAMUSCULAR; INTRAVENOUS at 06:08

## 2021-02-10 RX ADMIN — Medication 10 ML: at 08:31

## 2021-02-10 RX ADMIN — HEPARIN SODIUM 5000 UNITS: 10000 INJECTION INTRAVENOUS; SUBCUTANEOUS at 06:13

## 2021-02-10 RX ADMIN — NAFCILLIN SODIUM 2000 MG: 2 INJECTION, POWDER, FOR SOLUTION INTRAMUSCULAR; INTRAVENOUS at 06:00

## 2021-02-10 RX ADMIN — LOSARTAN POTASSIUM 100 MG: 50 TABLET, FILM COATED ORAL at 08:30

## 2021-02-10 RX ADMIN — AMLODIPINE BESYLATE 2.5 MG: 2.5 TABLET ORAL at 08:30

## 2021-02-10 RX ADMIN — PANTOPRAZOLE SODIUM 40 MG: 40 TABLET, DELAYED RELEASE ORAL at 06:13

## 2021-02-10 RX ADMIN — NAFCILLIN SODIUM 2000 MG: 2 INJECTION, POWDER, FOR SOLUTION INTRAMUSCULAR; INTRAVENOUS at 00:56

## 2021-02-10 RX ADMIN — ROPINIROLE HYDROCHLORIDE 0.5 MG: 0.5 TABLET, FILM COATED ORAL at 08:30

## 2021-02-10 RX ADMIN — TRAMADOL HYDROCHLORIDE 50 MG: 50 TABLET, FILM COATED ORAL at 03:43

## 2021-02-10 RX ADMIN — NAFCILLIN SODIUM 2000 MG: 2 INJECTION, POWDER, FOR SOLUTION INTRAMUSCULAR; INTRAVENOUS at 10:31

## 2021-02-10 RX ADMIN — NAFCILLIN SODIUM 2000 MG: 2 INJECTION, POWDER, FOR SOLUTION INTRAMUSCULAR; INTRAVENOUS at 12:50

## 2021-02-10 RX ADMIN — ATORVASTATIN CALCIUM 20 MG: 20 TABLET, FILM COATED ORAL at 08:30

## 2021-02-10 RX ADMIN — LAMOTRIGINE 150 MG: 100 TABLET ORAL at 08:30

## 2021-02-10 RX ADMIN — DESVENLAFAXINE 150 MG: 50 TABLET, EXTENDED RELEASE ORAL at 08:30

## 2021-02-10 RX ADMIN — ROPINIROLE HYDROCHLORIDE 0.5 MG: 0.5 TABLET, FILM COATED ORAL at 13:46

## 2021-02-10 RX ADMIN — HEPARIN SODIUM 5000 UNITS: 10000 INJECTION INTRAVENOUS; SUBCUTANEOUS at 13:47

## 2021-02-10 RX ADMIN — CARBIDOPA AND LEVODOPA 2 TABLET: 25; 100 TABLET, EXTENDED RELEASE ORAL at 08:31

## 2021-02-10 ASSESSMENT — PAIN DESCRIPTION - LOCATION: LOCATION: HIP

## 2021-02-10 ASSESSMENT — PAIN SCALES - GENERAL
PAINLEVEL_OUTOF10: 6
PAINLEVEL_OUTOF10: 6

## 2021-02-10 ASSESSMENT — PAIN - FUNCTIONAL ASSESSMENT: PAIN_FUNCTIONAL_ASSESSMENT: PREVENTS OR INTERFERES SOME ACTIVE ACTIVITIES AND ADLS

## 2021-02-10 ASSESSMENT — PAIN DESCRIPTION - FREQUENCY: FREQUENCY: CONTINUOUS

## 2021-02-10 NOTE — CARE COORDINATION
Plan is to return to [facility] where patient is a LTC bed hold-no pre-cert required when medically ready. Drain removed yesterday 2/9. Hgb 7.6 today. Await input and plan from internal med. Ambulance form in envelope in soft chart. Last  negative Covidtest was Thursday 2/4-will not require repeat unless symptomatic.   Maricel Murillo RN CM

## 2021-02-10 NOTE — TELEPHONE ENCOUNTER
----- Message from RASHARD Monahan CNP sent at 2/2/2021  4:12 PM EST -----  Regarding: MRI lumbar spine  Please call the [facility] and let them know that the patient's MRI lumbar spine showed a possible injection in her right sacroiliac joint and possible cellulitis and abscess in the soft tissue regions and muscles of her right hip.  She needs to be sent to the hospital for assessment and treatment today.       ----- Message -----  From: Ruslan Bowling Incoming Radiant Results From [employer]  Sent: 2/2/2021   3:59 PM EST  To: RASHARD Monahan CNP [Alert] : alert [No Acute Distress] : no acute distress [Normocephalic] : normocephalic [EOMI Bilateral] : EOMI bilateral [Pink Nasal Mucosa] : pink nasal mucosa [Clear tympanic membranes with bony landmarks and light reflex present bilaterally] : clear tympanic membranes with bony landmarks and light reflex present bilaterally  [Nonerythematous Oropharynx] : nonerythematous oropharynx [Supple, full passive range of motion] : supple, full passive range of motion [No Palpable Masses] : no palpable masses [Clear to Auscultation Bilaterally] : clear to auscultation bilaterally [Normal S1, S2 audible] : normal S1, S2 audible [Regular Rate and Rhythm] : regular rate and rhythm [No Murmurs] : no murmurs [+2 Femoral Pulses] : +2 femoral pulses [Soft] : soft [NonTender] : non tender [Non Distended] : non distended [Normoactive Bowel Sounds] : normoactive bowel sounds [No Hepatomegaly] : no hepatomegaly [No Splenomegaly] : no splenomegaly [No Abnormal Lymph Nodes Palpated] : no abnormal lymph nodes palpated [Normal Muscle Tone] : normal muscle tone [No pain or deformities with palpation of bone, muscles, joints] : no pain or deformities with palpation of bone, muscles, joints [Straight] : straight [No Scoliosis] : no scoliosis [No Rash or Lesions] : no rash or lesions

## 2021-02-10 NOTE — PROGRESS NOTES
Department of Internal Medicine  Infectious Diseases  Progress  Note      C/C : MSSA bacteremia ,right psoas abscess     Pt is awake and alert  Denies fever  Reports back pain   Afebrile       Current Facility-Administered Medications   Medication Dose Route Frequency Provider Last Rate Last Admin    0.9 % sodium chloride infusion   Intravenous PRN Vivien Garza MD        lidocaine PF 1 % injection 5 mL  5 mL Intradermal Once Bobby Dalton MD        sodium chloride flush 0.9 % injection 10 mL  10 mL Intravenous PRN Bobby Dalton MD        heparin flush 100 UNIT/ML injection 300 Units  3 mL Intravenous 2 times per day Stacy Devries MD   300 Units at 02/10/21 0831    heparin flush 100 UNIT/ML injection 300 Units  3 mL Intracatheter PRN Bobby Dalton MD        nafcillin 2,000 mg in dextrose 5 % 100 mL IVPB (mini-bag)  2,000 mg Intravenous Q4H Bobby Dalton  mL/hr at 02/10/21 1250 2,000 mg at 02/10/21 1250    amLODIPine (NORVASC) tablet 2.5 mg  2.5 mg Oral Daily Macario Jhaveri MD   2.5 mg at 02/10/21 0830    aspirin chewable tablet 81 mg  81 mg Oral Nightly Macario Jhaveri MD   81 mg at 02/09/21 2009    atorvastatin (LIPITOR) tablet 20 mg  20 mg Oral Daily Macario Jhaveri MD   20 mg at 02/10/21 0830    desvenlafaxine succinate (PRISTIQ) extended release tablet 150 mg  150 mg Oral QAM Macario Jhaveri MD   150 mg at 02/10/21 0830    hydrOXYzine (VISTARIL) capsule 50 mg  50 mg Oral Nightly Macario Jhaveri MD   50 mg at 02/09/21 2010    insulin lispro (HUMALOG) injection vial 0-6 Units  0-6 Units Subcutaneous TID WC Macario Jhaveri MD   1 Units at 02/07/21 1627    insulin lispro (HUMALOG) injection vial 0-3 Units  0-3 Units Subcutaneous Nightly Macario Jhaveri MD   1 Units at 02/07/21 2044    lamoTRIgine (LAMICTAL) tablet 150 mg  150 mg Oral Daily Macario Jhaveri MD   150 mg at 02/10/21 0830    lidocaine 4 % external patch 1 patch  1 patch Transdermal Daily Macario Jhaveri MD   1 patch at 02/10/21 0829    losartan (COZAAR) tablet 100 mg  100 mg Oral Daily Jessie Mckeon MD   100 mg at 02/10/21 0830    pantoprazole (PROTONIX) tablet 40 mg  40 mg Oral QAM AC Jessie Mckeon MD   40 mg at 02/10/21 0613    rOPINIRole (REQUIP) tablet 0.5 mg  0.5 mg Oral TID Jessie Mckeon MD   0.5 mg at 02/10/21 1346    traMADol (ULTRAM) tablet 50 mg  50 mg Oral Q6H PRN Jessie Mckeon MD   50 mg at 02/10/21 0343    traZODone (DESYREL) tablet 150 mg  150 mg Oral Nightly Jessie Mckeon MD   150 mg at 02/09/21 2200    morphine (PF) injection 2 mg  2 mg Intravenous Q4H PRN Jessie Mckeon MD   2 mg at 02/10/21 0608    sodium chloride flush 0.9 % injection 10 mL  10 mL Intravenous 2 times per day Jessie Mckeon MD   10 mL at 02/10/21 0831    sodium chloride flush 0.9 % injection 10 mL  10 mL Intravenous PRN Jessie Mckeon MD        promethazine (PHENERGAN) tablet 12.5 mg  12.5 mg Oral Q6H PRN Jessie Mckeon MD        Or    ondansetron (ZOFRAN) injection 4 mg  4 mg Intravenous Q6H PRN Jessie Mckeon MD   4 mg at 02/10/21 0911    acetaminophen (TYLENOL) tablet 650 mg  650 mg Oral Q6H PRN Jessie Mckeon MD        Or    acetaminophen (TYLENOL) suppository 650 mg  650 mg Rectal Q6H PRN Jessie Mckeon MD        heparin (porcine) injection 5,000 Units  5,000 Units Subcutaneous Q8H Jessie Mckeon MD   5,000 Units at 02/10/21 1347    polyethylene glycol (GLYCOLAX) packet 17 g  17 g Oral BID Jessie Mckeon MD   17 g at 02/09/21 2009    bisacodyl (DULCOLAX) EC tablet 5 mg  5 mg Oral Daily PRN Jessie Mckeon MD   5 mg at 02/09/21 1631    glucose (GLUTOSE) 40 % oral gel 15 g  15 g Oral PRN Jessie Mckeon MD        dextrose 50 % IV solution  12.5 g Intravenous PRN Jessie Mckeon MD        glucagon (rDNA) injection 1 mg  1 mg Intramuscular PRN Jessie Mckeon MD        dextrose 5 % solution  100 mL/hr Intravenous PRN Jessie Mckeon MD        0.45 % sodium chloride infusion   Intravenous Continuous Jessie Mckeon MD 75 mL/hr at 02/09/21 2021 New Bag at 02/09/21 2021    carbidopa-levodopa (SINEMET CR)  MG per extended release tablet 2 tablet  2 tablet Oral TID Tesfaye Oates MD   2 tablet at 02/10/21 1346         REVIEW OF SYSTEMS:    CONSTITUTIONAL:  Denies fever, chill or rigors, nausea or vomiting. HEENT: denies blurring of vision or double vision, denies hearing problem  RESPIRATORY: denies cough, shortness of breath, sputum expectoration, chest pain. CARDIOVASCULAR:  Denies palpitation  GASTROINTESTINAL:  Denies abdomen pain, diarrhea or constipation. GENITOURINARY:  Denies burning urination or frequency of urination  INTEGUMENT: denies wound , rash  HEMATOLOGIC/LYMPHATIC:  Denies lymph node swelling, gum bleeding or easy bruising. MUSCULOSKELETAL: Low back pain   NEUROLOGICAL:  Denies light headed, dizziness,    PHYSICAL EXAM:      Vitals:     BP (!) 114/57   Pulse 82   Temp 98.3 °F (36.8 °C) (Temporal)   Resp 18   Ht 5' (1.524 m)   Wt 187 lb 6 oz (85 kg)   SpO2 96%   BMI 36.59 kg/m²       General Appearance:    Awake, alert , no acute distress. Head:    Normocephalic, atraumatic   Eyes:    No pallor, no icterus,   Ears:    No obvious deformity or drainage.    Nose:   No nasal drainage   Throat:   Mucosa moist, no oral thrush   Neck:   Supple, no lymphadenopathy   Back:     no CVA tenderness   Lungs:     Clear to auscultation bilaterally, no wheeze    Heart:    Regular rate and rhythm, no murmur,   Abdomen:     Soft, non-tender, bowel sounds present    Extremities:   ++ edema    Pulses:   Dorsalis pedis palpable    Skin:   no rashes or lesions     Lines : Left basilic PICC ( 2/6)     CBC with Differential:      Lab Results   Component Value Date    WBC 6.0 02/10/2021    RBC 2.78 02/10/2021    HGB 7.6 02/10/2021    HCT 23.8 02/10/2021     02/10/2021    MCV 85.6 02/10/2021    MCH 27.3 02/10/2021    MCHC 31.9 02/10/2021    RDW 15.9 02/10/2021    NRBC 0.0 03/15/2019    SEGSPCT 74 08/20/2013    LYMPHOPCT 16.8 02/10/2021    MONOPCT 7.9 02/10/2021    MYELOPCT 0.9 03/15/2019    EOSPCT 1 06/16/2017    BASOPCT 0.5 02/10/2021    MONOSABS 0.47 02/10/2021    LYMPHSABS 1.00 02/10/2021    EOSABS 0.09 02/10/2021    BASOSABS 0.03 02/10/2021       CMP     Lab Results   Component Value Date     02/10/2021    K 3.8 02/10/2021    K 5.0 02/03/2021     02/10/2021    CO2 24 02/10/2021    BUN 15 02/10/2021    CREATININE 1.0 02/10/2021    GFRAA >60 02/10/2021    LABGLOM 55 02/10/2021    GLUCOSE 75 02/10/2021    PROT 6.6 02/03/2021    LABALBU 3.1 02/03/2021    CALCIUM 8.2 02/10/2021    BILITOT 0.8 02/03/2021    ALKPHOS 208 02/03/2021    AST 12 02/03/2021    ALT 6 02/03/2021         Hepatic Function Panel:    Lab Results   Component Value Date    ALKPHOS 208 02/03/2021    ALT 6 02/03/2021    AST 12 02/03/2021    PROT 6.6 02/03/2021    BILITOT 0.8 02/03/2021    BILIDIR 0.2 07/20/2013    LABALBU 3.1 02/03/2021       PT/INR:    Lab Results   Component Value Date    PROTIME 13.5 02/09/2021    INR 1.2 02/09/2021       TSH:    Lab Results   Component Value Date    TSH 2.74 06/16/2017       U/A:    Lab Results   Component Value Date    COLORU Yellow 10/26/2020    PHUR 6.0 10/26/2020    WBCUA 2-5 05/29/2019    RBCUA NONE 05/29/2019    RBCUA NONE 03/25/2013    YEAST RARE 10/27/2015    BACTERIA RARE 05/29/2019    CLARITYU Clear 10/26/2020    SPECGRAV 1.015 10/26/2020    LEUKOCYTESUR Negative 10/26/2020    UROBILINOGEN 0.2 10/26/2020    BILIRUBINUR Negative 10/26/2020    BLOODU Negative 10/26/2020    GLUCOSEU Negative 10/26/2020       ABG:  No results found for: SFF3OCE, BEART, V9ZNWPBM, PHART, THGBART, BVK2TKY, PO2ART, VGT4HYJ    MICROBIOLOGY:    Blood culture -    Blood ID, Molecular [[id number]] (Abnormal)    Collected: 02/03/21 0254    Updated: 02/04/21 1012     Bottle Type Aerobic    Source of Blood Culture No site given    Order Number [id number]    Enterobacter cloacae complex by PCR Not Detected    Escherichia coli by PCR Not Detected    Klebsiella oxytoca by PCR Not Detected seen.   Moderate concentric left ventricular hypertrophy. Normal right ventricular size and function. No systolic mitral regurgitation noted. No hemodynamically significant aortic stenosis is present. Unable to estimate PA systolic pressure. No evidence for hemodynamically significant pericardial effusion. Valves were not well visualized, please consider PAT if clinical suspicion   for endocarditis is high. IMPRESSION:     1. Right psoas abscess s/p CT guided drainage ( cx  MSSA  ) - drain removed 2/9   2. MSSA bacteremia -follow up blood cx neg on 2/5     RECOMMENDATIONS:      1. Nafcillin 2 grams IV q 4 hrs X 8 weeks   2. Follow Sed rate , CRP , monitor LFT, Cr   3. ID follow up in 1-2 weeks

## 2021-02-10 NOTE — DISCHARGE INSTR - COC
Continuity of Care Form    Patient Name: Radha Gibbons   :    MRN:  [id number]    Admit date:  2/3/2021  Discharge date:  02/10/2021    Code Status Order: Full Code   Advance Directives:   [facility] Documentation     Date/Time Healthcare Directive Type of Healthcare Directive Copy in [address] Agent's Name Healthcare Agent's Phone Number    21 0130  No, patient does not have an advance directive for healthcare treatment -- -- -- -- --          Admitting Physician:  Clau Lujan MD  PCP: Fernando Thorne MD    Discharging Nurse:   [address] Unit/Room#: 0394/7810-R  Discharging Unit Phone Number: [phone]    Emergency Contact:   Extended Emergency Contact Information  Primary Emergency Contact: Latosha Jack [address] Phone: [phone]  Mobile Phone: [phone]  Relation: Child   needed?  No    Past Surgical History:  Past Surgical History:   Procedure Laterality Date    BREAST LUMPECTOMY      BREAST REDUCTION SURGERY      CARDIAC CATHETERIZATION  2014    Dr. Leslee Da Silva COLONOSCOPY  7/29/15    ENDOSCOPY, COLON, DIAGNOSTIC  7/19/15    GALLBLADDER SURGERY      LUMBAR LAMINECTOMY      TOE AMPUTATION      TONSILLECTOMY      UPPER GASTROINTESTINAL ENDOSCOPY         Immunization History:   Immunization History   Administered Date(s) Administered    Influenza Vaccine, unspecified formulation 10/15/2014    Influenza Virus Vaccine 10/31/2008, 10/05/2011    Influenza, High Dose (Fluzone 65 yrs and older) 2015, 2016, 2017, 2019    Influenza, Triv, inactivated, subunit, adjuvanted, IM (Fluad 65 yrs and older) 2019    Pneumococcal Conjugate 13-valent ([id number]) 2016    Pneumococcal Polysaccharide ([id number]) 2012, 2018    Td, unspecified formulation 2014    Tdap (Boostrix, Adacel) 2015    Zoster Live (Zostavax) 06/25/2013    Zoster Recombinant (Shingrix) 12/10/2019       Active Problems:  Patient Active Problem List   Diagnosis Code    Depression with anxiety F41.8    Osteoporosis M81.0    Asthma J45.909    Hyperlipidemia E78.5    Mitral regurgitation I34.0    Obstructive sleep apnea syndrome G47.33    Psoriasis L40.9    Type 2 diabetes mellitus with diabetic polyneuropathy, with long-term current use of insulin (HCC) E11.42, Z79.4    Parkinson disease ([facility] [address] G20    Essential hypertension I10    Microalbuminuria R80.9    Class 3 severe obesity due to excess calories with serious comorbidity and body mass index (BMI) of 40.0 to 44.9 in adult ([facility] [address] E66.01, Z68.41    Right-sided low back pain with right-sided sciatica M54.41    Right leg weakness R29.898    Abscess in epidural space of lumbar spine G06.1    Psoas abscess ([facility]) K68.12    Psoas mass M62.89       Isolation/Infection:   Isolation          No Isolation        Patient Infection Status     Infection Onset Added Last Indicated Last Indicated By Review Planned Expiration Resolved Resolved By    None active    Resolved    COVID-19 Rule Out 02/04/21 02/04/21 02/04/21 COVID-19 (Ordered)   02/04/21 Rule-Out Test Resulted          Nurse Assessment:  Last Vital Signs: BP (!) 114/57   Pulse 82   Temp 98.3 °F (36.8 °C) (Temporal)   Resp 18   Ht 5' (1.524 m)   Wt 187 lb 6 oz (85 kg)   SpO2 96%   BMI 36.59 kg/m²     Last documented pain score (0-10 scale): Pain Level: 6  Last Weight:   Wt Readings from Last 1 Encounters:   02/03/21 187 lb 6 oz (85 kg)     Mental Status:  oriented and alert    IV Access:  - PICC - site  L Basilic, insertion date: 02/06/2021    Nursing Mobility/ADLs:  Walking   Assisted  Transfer  Assisted  Bathing  Assisted  Dressing  Assisted  Toileting  Assisted  Feeding  Independent  Med Admin  Independent  Med Delivery   whole    Wound Care Documentation and Therapy:        Elimination:  Continence:   · Bowel: {YES / TC:12574}  · Bladder: {YES / XH:73952}  Urinary Catheter: None   Colostomy/Ileostomy/Ileal Conduit: {YES / VU:09367}       Date of Last BM: 02/10/2021    Intake/Output Summary (Last 24 hours) at 2/10/2021 1227  Last data filed at 2/9/2021 1834  Gross per 24 hour   Intake 548.33 ml   Output --   Net 548.33 ml     I/O last 3 completed shifts: In: 548.3 [Blood:548.3]  Out: -     Safety Concerns: At Risk for Falls    Impairments/Disabilities:      None    Nutrition Therapy:  Current Nutrition Therapy:   - Oral Diet:  Carb Control 3 carbs/meal (1500kcals/day)    Routes of Feeding: Oral  Liquids: {Slp liquid thickness:87479}  Daily Fluid Restriction: no  Last Modified Barium Swallow with Video (Video Swallowing Test): not done    Treatments at the Time of Hospital Discharge:   Respiratory Treatments: ***  Oxygen Therapy:  is not on home oxygen therapy.   Ventilator:    - No ventilator support    Rehab Therapies: {THERAPEUTIC INTERVENTION:5698442996}  Weight Bearing Status/Restrictions: No weight bearing restirctions  Other Medical Equipment (for information only, NOT a DME order):  {EQUIPMENT:289573050}  Other Treatments: ***    Patient's personal belongings (please select all that are sent with patient):  Neomia Bloodgood, etc    RN SIGNATURE:  Electronically signed by Lucero Ford RN on 2/10/21 at 1:39 PM EST    CASE MANAGEMENT/SOCIAL WORK SECTION    Inpatient Status Date: ***    Readmission Risk Assessment Score:  Readmission Risk              Risk of Unplanned Readmission:        19           Discharging to Facility/ Agency   · Name:   · Address:  · Phone:  · Fax:    Dialysis Facility (if applicable)   · Name:  · Address:  · Dialysis Schedule:  · Phone:  · Fax:    / signature: {Esignature:259055656}    PHYSICIAN SECTION    Prognosis: {Prognosis:2942784974}    Condition at Discharge: [address] Patient Condition:734330140}    Rehab Potential (if transferring to Rehab): {Prognosis:2180298515}    Recommended Labs or Other Treatments After Discharge: ***    Physician Certification: I certify the above information and transfer of Refugjoao Robles  is necessary for the continuing treatment of the diagnosis listed and that she requires {Admit to Appropriate Level of Care:73099} for {GREATER/LESS:995596008} 30 days.      Update Admission H&P: {CHP DME Changes in QEVSC:832271107}    PHYSICIAN SIGNATURE:  Dr. Marielena Buitrago

## 2021-02-10 NOTE — CARE COORDINATION
Discharge order noted. Transportation set up via [employer] for 3 pm today to [facility]. Charge nurse, RN, liaison and patient all notified. Voicemail message left for patient's son Josh Torres regarding discharge and transportation time. Ambulance form in envelope in soft chart. Last  negative Covidtest was Thursday 2/4-will not require repeat.   Eliceo Rosales RN CM

## 2021-03-09 NOTE — DISCHARGE SUMMARY
Hospital Medicine Discharge Summary    Patient ID: Mary Salvador      Patient's PCP: Silvia Sosa MD    Admit Date: 2/3/2021     Discharge Date: 2/10/2021      Admitting Physician: Victor Hugo Humphries MD     Discharge Physician: Nash Vizcaino MD     Discharge Diagnoses: Active Hospital Problems    Diagnosis Date Noted    Psoas mass [M62.89]     Psoas abscess [facility]) [K68.12] 02/03/2021    Abscess in epidural space of lumbar spine [G06.1] 02/02/2021       The patient was seen and examined on day of discharge and this discharge summary is in conjunction with any daily progress note from day of discharge. Hospital Course:   Patient admitted on 2/3/2021 as a transfer from [facility] had presented there with a complaint of low back pain and MRI of the lumbar spine done showed a right sacral iliac joint infection and cellulitis as well as abscess formation of the right psoas muscle and moderate to severe lumbar foraminal stenosis.  She did not have any saddle anesthesia, bladder or bowel incontinence. Ozzie Vargas was transferred to [facility] in the early hours of 2/3/2021 for neurosurgery evaluation. Neurosurgery evaluated her and advocated for nonsurgical management. ID consulted.   She was started on nafcillin, and had right psoas abscess drainage on 2/5/2021/ Blood cultures are growing Staph aureus. 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 Both general and orthopedic surgery have seen the patient and the plan is for continued antibiotics and drainage. Infectious disease continues with nafcillin 2 g IV every 4 hours.  Discharged to [facility] in stable condition with pcp follow up                  Exam:     BP (!) 114/57   Pulse 82   Temp 98.3 °F (36.8 °C) (Temporal)   Resp 18   Ht 5' (1.524 m)   Wt 187 lb 6 oz (85 the following most recent labs are provided:      CBC:    Lab Results   Component Value Date    WBC 6.0 02/10/2021    HGB 7.6 02/10/2021    HCT 23.8 02/10/2021     02/10/2021       Renal:    Lab Results   Component Value Date     02/10/2021    K 3.8 02/10/2021    K 5.0 02/03/2021     02/10/2021    CO2 24 02/10/2021    BUN 15 02/10/2021    CREATININE 1.0 02/10/2021    CALCIUM 8.2 02/10/2021       Discharge Medications:     Discharge Medication List as of 2/10/2021  2:31 PM           Details   nafcillin 2 g injection Infuse 2,000 mg intravenously every 4 hours For 8 weeks, Disp-[id number] mg, R-1Print              Details   omeprazole (PRILOSEC) 20 MG delayed release capsule Take 20 mg by mouth dailyHistorical Med      hydrOXYzine (VISTARIL) 50 MG capsule Take 50 mg by mouth nightlyHistorical Med      lidocaine (LIDODERM) 5 % Place 1 patch onto the skin daily R hipHistorical Med      losartan (COZAAR) 100 MG tablet Take 100 mg by mouth dailyHistorical Med      traMADol (ULTRAM) 50 MG tablet Take 50 mg by mouth every 6 hours as needed for Pain. Historical Med      rOPINIRole (REQUIP) 0.5 MG tablet Take 1 tablet by mouth 3 times daily, Disp-270 tablet, R-0**Patient requests 90 days supply**Normal      carbidopa-levodopa (SINEMET CR)  MG per extended release tablet Take 1 tablet by mouth 3 times daily, Disp-270 tablet, R-0Normal      insulin glargine (LANTUS SOLOSTAR) 100 UNIT/ML injection pen Inject 80 Units into the skin nightly, Disp-5 pen, R-3Normal      blood glucose test strips (ACCU-CHEK RIGOBERTO PLUS) strip 1 each by In Vitro route 2 times daily Indications: DISP ACCU-CHEK As needed. Historical Med      amLODIPine (NORVASC) 2.5 MG tablet Take 1 tablet by mouth daily, Disp-90 tablet, R-0**Patient requests 90 days supply**Normal      lamoTRIgine (LAMICTAL) 150 MG tablet Take 150 mg by mouth daily , R-0Historical Med      atorvastatin (LIPITOR) 20 MG tablet Take 1 tablet by mouth daily, Disp-90 tablet, R-3**Patient requests 90 days supply**Normal      insulin aspart (NOVOLOG FLEXPEN) 100 UNIT/ML injection pen INJECT 0-10 UNITS INTO THE SKIN THREE TIMES DAILY(BEFORE MEALS) SLIDING SCALE (MAX DAILY 30 UNITS), Disp-5 pen, R-3Normal      aspirin 81 MG tablet Take 81 mg by mouth nightlyHistorical Med      traZODone (DESYREL) 50 MG tablet Take 150 mg by mouth nightly , R-2Historical Med      desvenlafaxine (PRISTIQ) 50 MG TB24 Take 150 mg by mouth every morning Historical Med             Time Spent on discharge is more than 45 minutes in the examination, evaluation, counseling and review of medications and discharge plan.       Signed:    Pricilla Luke MD   3/9/2021

## 2021-05-21 ENCOUNTER — TELEPHONE (OUTPATIENT)
Dept: ADMINISTRATIVE | Age: 71
End: 2021-05-21

## 2021-05-21 NOTE — TELEPHONE ENCOUNTER
Transportation company called stating they could not bring in time for her 1:40 appt today. They asked if she could be seen later today. Per Office staff, pt would have to be rescheduled to next available on 6/16 or 6/17/21. Transportation company stated they will reach out to pt to let her know to reschedule. I left a voicemail for pt telling her the same.

## 2021-06-01 DIAGNOSIS — G20 PARKINSON DISEASE (HCC): ICD-10-CM

## 2021-06-01 RX ORDER — ROPINIROLE 0.5 MG/1
0.5 TABLET, FILM COATED ORAL 3 TIMES DAILY
Qty: 270 TABLET | Refills: 3 | Status: SHIPPED
Start: 2021-06-01 | End: 2021-06-03 | Stop reason: SDUPTHER

## 2021-06-01 RX ORDER — CARBIDOPA AND LEVODOPA 50; 200 MG/1; MG/1
1 TABLET, EXTENDED RELEASE ORAL 3 TIMES DAILY
Qty: 270 TABLET | Refills: 3 | Status: SHIPPED
Start: 2021-06-01 | End: 2021-09-14 | Stop reason: SDUPTHER

## 2021-06-03 DIAGNOSIS — G20 PARKINSON DISEASE (HCC): ICD-10-CM

## 2021-06-03 RX ORDER — ROPINIROLE 0.5 MG/1
0.5 TABLET, FILM COATED ORAL 3 TIMES DAILY
Qty: 270 TABLET | Refills: 3 | Status: SHIPPED
Start: 2021-06-03 | End: 2021-08-11 | Stop reason: SDUPTHER

## 2021-06-15 NOTE — PROGRESS NOTES
[address] [facility]. Luisana Joy M.D., F.A.C.P. Rebekah Kim, DNP, APRN, CNS  Kaci Pisano. Elisha Thorpe, MSN, APRN-FNP-C  Radha Munoz MSN, APRN, FNP-C  Anuradha CASANOVA, PA-C  [address] MSN, APRN, FNP-C  [address], [place] [address]  [place], [address]  Phone: [phone]  Fax: [phone]         Stephen Sheffield is a 69 y.o. right handed woman. We are following her for Parkinson's disease    She presents alone is a good historian    MRI of the lumbar spine done earlier this year showed an abscess in her right psoas, and she underwent hospitalization, was found to have bacteremia as well, underwent surgery--she continues to follow with infectious disease and is on antibiotics. .. Repeat MRI of lumbar spine in April showed resolution of her abscess, but chronic inflammation, osteomyelitis in her right SI joint, and swelling. She feels better overall and denies any further right leg weakness or severe pains    Tremors are exacerbated by anxiety and stress, which she states she has been having a lot of recently. She is following with psychiatry, and on new medications. Taking Sinemet CR 50/200 three times daily and Requip 0.5 mg TID--she is having worsening of her restless leg symptoms in the evening and is asking about an increase in Requip. No lightheadedness or syncopal events. No bradykinesias, gait freezing, or significant falls. She has been noting some difficulty rising from a chair but is not currently exercising. Eating and drinking adequately    No memory changes. Psychiatry prescribed medication for sleep.     Medications:     Current Outpatient Medications   Medication Sig Dispense Refill    rOPINIRole (REQUIP) 0.5 MG tablet Take 1 tablet by mouth 3 times daily 270 tablet 3    carbidopa-levodopa (SINEMET CR)  MG per extended release tablet Take 1 tablet by mouth 3 times daily 270 tablet 3    omeprazole (PRILOSEC) 20 MG delayed release extraocular muscles    EOMI without nystagmus   V: mastication normal   V: facial light touch sensation  normal   V,VII: corneal reflex     VII: facial muscle function - upper     VII: facial muscle function - lower Normal   VIII: hearing Decreased R (wears aid)   IX: soft palate elevation  normal   IX,X: gag reflex    XI: trapezius strength  5/5   XI: sternocleidomastoid strength 5/5   XI: neck extension strength   5/5   XII: tongue strength  Normal     Motor:  5/5 BUE  4+/5 RLE; 5/5 LLE  Obese bulk and normal tone  No cogwheeling or abnormal movements    Sensory:  LT normal in all limbs    Coordination:   FN, FFM--normal    Gait:  Mild Gowers present  Waddles and using cane--no festination or gait freezing    DTR:  1+ throughout  No Kohler's    Laboratory/Radiology:  ry/Radiology:     Lab Results   Component Value Date    WBC 6.0 02/10/2021    HGB 7.6 (L) 02/10/2021    HCT 23.8 (L) 02/10/2021    MCV 85.6 02/10/2021     02/10/2021    LYMPHOPCT 16.8 (L) 02/10/2021    RBC 2.78 (L) 02/10/2021    MCH 27.3 02/10/2021    MCHC 31.9 (L) 02/10/2021    RDW 15.9 (H) 02/10/2021     Lab Results   Component Value Date     02/10/2021    K 3.8 02/10/2021     02/10/2021    CO2 24 02/10/2021    BUN 15 02/10/2021    CREATININE 1.0 02/10/2021    GLUCOSE 75 02/10/2021    CALCIUM 8.2 (L) 02/10/2021    PROT 6.6 02/03/2021    LABALBU 3.1 (L) 02/03/2021    BILITOT 0.8 02/03/2021    ALKPHOS 208 (H) 02/03/2021    AST 12 02/03/2021    ALT 6 02/03/2021    LABGLOM 55 02/10/2021    GFRAA >60 02/10/2021     MRI lumbar spine Feb 2021: Findings suspicious for infection involving the right sacroiliac joint, with cellulitis and abscesses in the anterior soft tissue, including multilocular abscesses in the right psoas muscle. Further evaluation with pre and post contrast enhanced MRI of the sacrum is recommended. 2. No evidence of fracture or infection in the lumbar spine. 3. Degenerative changes in the lumbar spine.   No central canal stenosis. 4. Neural foraminal stenoses in the lower lumbar spine, worst (moderate to severe) at the left L4-5 and right L5-S1 levels. R/p MRI lumbar spine April 2021: infected R S1 joint with osteomyelitis. Diffuse inflammation R pelvic sidewall, iliopsoas, piriformis. No residual abscess    All labs and images personally reviewed today    Assessment:     Parkinson's disease: Good control of motor symptoms on current Sinemet and Requip regimen. I find no tremors today. Anxiety and insomnia are being managed by psychiatry, and no other significant nonmotor symptoms.     RLS: We will increase evening dosage of Requip with caution due to potential for side effects    Diabetic peripheral neuropathy    MSSA R psoas abscess w bacteremia: s/p surgery and following with ID    Anxiety    Plan:     Increase Requip 0.5 mg AM, 0.5 mg afternoon, and 1 mg nightly    Continue Sinemet CR 50/200 mg TID     Tx of anxiety and insomnia per psychiatry    Strongly encouraged home exercise program--exercises given on AVS    RTO in 6 mos or sooner PRN    RASHARD Nunes - CNP   8/11/2021

## 2021-06-23 ENCOUNTER — TELEPHONE (OUTPATIENT)
Dept: NEUROLOGY | Age: 71
End: 2021-06-23

## 2021-06-23 NOTE — TELEPHONE ENCOUNTER
Spoke with pt who stated, she would like to be seen sooner if there are any cancellations, prefer [facility]

## 2021-06-24 NOTE — TELEPHONE ENCOUNTER
No openings at this time.      Electronically signed by Archana Subramanian MA on 6/24/2021 at 9:59 AM

## 2021-08-11 ENCOUNTER — OFFICE VISIT (OUTPATIENT)
Dept: NEUROLOGY | Age: 71
End: 2021-08-11
Payer: MEDICARE

## 2021-08-11 VITALS
RESPIRATION RATE: 16 BRPM | OXYGEN SATURATION: 92 % | HEART RATE: 76 BPM | TEMPERATURE: 98.6 F | DIASTOLIC BLOOD PRESSURE: 66 MMHG | SYSTOLIC BLOOD PRESSURE: 145 MMHG

## 2021-08-11 DIAGNOSIS — G25.81 RLS (RESTLESS LEGS SYNDROME): ICD-10-CM

## 2021-08-11 DIAGNOSIS — G20 PARKINSON DISEASE (HCC): Primary | ICD-10-CM

## 2021-08-11 PROBLEM — R29.898 RIGHT LEG WEAKNESS: Status: RESOLVED | Noted: 2021-01-13 | Resolved: 2021-08-11

## 2021-08-11 PROBLEM — G06.1 ABSCESS IN EPIDURAL SPACE OF LUMBAR SPINE: Status: RESOLVED | Noted: 2021-02-02 | Resolved: 2021-08-11

## 2021-08-11 PROBLEM — K68.12 PSOAS ABSCESS (HCC): Status: RESOLVED | Noted: 2021-02-03 | Resolved: 2021-08-11

## 2021-08-11 PROBLEM — M54.41 RIGHT-SIDED LOW BACK PAIN WITH RIGHT-SIDED SCIATICA: Status: RESOLVED | Noted: 2021-01-13 | Resolved: 2021-08-11

## 2021-08-11 PROCEDURE — 99214 OFFICE O/P EST MOD 30 MIN: CPT | Performed by: NURSE PRACTITIONER

## 2021-08-11 PROCEDURE — 3017F COLORECTAL CA SCREEN DOC REV: CPT | Performed by: NURSE PRACTITIONER

## 2021-08-11 PROCEDURE — G8399 PT W/DXA RESULTS DOCUMENT: HCPCS | Performed by: NURSE PRACTITIONER

## 2021-08-11 PROCEDURE — G8427 DOCREV CUR MEDS BY ELIG CLIN: HCPCS | Performed by: NURSE PRACTITIONER

## 2021-08-11 PROCEDURE — G9899 SCRN MAM PERF RSLTS DOC: HCPCS | Performed by: NURSE PRACTITIONER

## 2021-08-11 PROCEDURE — 4040F PNEUMOC VAC/ADMIN/RCVD: CPT | Performed by: NURSE PRACTITIONER

## 2021-08-11 PROCEDURE — G8417 CALC BMI ABV UP PARAM F/U: HCPCS | Performed by: NURSE PRACTITIONER

## 2021-08-11 PROCEDURE — 1036F TOBACCO NON-USER: CPT | Performed by: NURSE PRACTITIONER

## 2021-08-11 PROCEDURE — 1090F PRES/ABSN URINE INCON ASSESS: CPT | Performed by: NURSE PRACTITIONER

## 2021-08-11 PROCEDURE — 1123F ACP DISCUSS/DSCN MKR DOCD: CPT | Performed by: NURSE PRACTITIONER

## 2021-08-11 RX ORDER — ROPINIROLE 0.5 MG/1
TABLET, FILM COATED ORAL
Qty: 360 TABLET | Refills: 3 | Status: ON HOLD
Start: 2021-08-11 | End: 2021-10-25

## 2021-08-11 RX ORDER — VENLAFAXINE HYDROCHLORIDE 150 MG/1
150 CAPSULE, EXTENDED RELEASE ORAL DAILY
Status: ON HOLD | COMMUNITY
End: 2022-05-17 | Stop reason: HOSPADM

## 2021-08-11 RX ORDER — LOSARTAN POTASSIUM 50 MG/1
50 TABLET ORAL DAILY
Status: ON HOLD | COMMUNITY
End: 2022-04-19 | Stop reason: HOSPADM

## 2021-08-11 RX ORDER — DOXEPIN HYDROCHLORIDE 10 MG/1
10 CAPSULE ORAL NIGHTLY
Status: ON HOLD | COMMUNITY
End: 2022-05-17 | Stop reason: HOSPADM

## 2021-08-11 RX ORDER — ALPRAZOLAM 0.5 MG/1
0.5 TABLET ORAL DAILY
Status: ON HOLD | COMMUNITY
End: 2021-10-25 | Stop reason: HOSPADM

## 2021-09-14 DIAGNOSIS — G20 PARKINSON DISEASE (HCC): ICD-10-CM

## 2021-09-14 RX ORDER — CARBIDOPA AND LEVODOPA 50; 200 MG/1; MG/1
1 TABLET, EXTENDED RELEASE ORAL 3 TIMES DAILY
Qty: 270 TABLET | Refills: 3 | Status: SHIPPED
Start: 2021-09-14 | End: 2021-09-16 | Stop reason: SDUPTHER

## 2021-09-16 DIAGNOSIS — G20 PARKINSON DISEASE (HCC): ICD-10-CM

## 2021-09-16 RX ORDER — CARBIDOPA AND LEVODOPA 50; 200 MG/1; MG/1
1 TABLET, EXTENDED RELEASE ORAL 3 TIMES DAILY
Qty: 90 TABLET | Refills: 0 | Status: SHIPPED
Start: 2021-09-16 | End: 2021-12-27 | Stop reason: SDUPTHER

## 2021-09-16 NOTE — TELEPHONE ENCOUNTER
Pt called [facility]ie Wadsworth had ordered her mailorder-carbidopa-levodopa however she only had 3 pills left and they have not come yet.  t is [facility]

## 2021-10-22 ENCOUNTER — TELEPHONE (OUTPATIENT)
Dept: NEUROLOGY | Age: 71
End: 2021-10-22

## 2021-10-22 DIAGNOSIS — G20 PARKINSON DISEASE (HCC): ICD-10-CM

## 2021-10-22 NOTE — TELEPHONE ENCOUNTER
Patient called in stating that her left arm is shaking more. She stated that it is in the morning and afternoon. She is not aware of anything triggers it. She is wanting to know if her Requip needs to be increased. Please advise. She is aware that [facility] is not in until Monday.

## 2021-10-23 ENCOUNTER — APPOINTMENT (OUTPATIENT)
Dept: GENERAL RADIOLOGY | Age: 71
End: 2021-10-23
Payer: MEDICARE

## 2021-10-23 ENCOUNTER — APPOINTMENT (OUTPATIENT)
Dept: CT IMAGING | Age: 71
End: 2021-10-23
Payer: MEDICARE

## 2021-10-23 ENCOUNTER — HOSPITAL ENCOUNTER (OUTPATIENT)
Age: 71
Setting detail: OBSERVATION
Discharge: HOME | End: 2021-10-25
Attending: EMERGENCY MEDICINE | Admitting: INTERNAL MEDICINE
Payer: MEDICARE

## 2021-10-23 DIAGNOSIS — R53.1 GENERALIZED WEAKNESS: ICD-10-CM

## 2021-10-23 DIAGNOSIS — R26.2 INABILITY TO WALK: Primary | ICD-10-CM

## 2021-10-23 LAB
ALBUMIN SERPL-MCNC: 3.9 G/DL (ref 3.5–5.2)
ALP BLD-CCNC: 99 U/L (ref 35–104)
ALT SERPL-CCNC: <5 U/L (ref 0–32)
ANION GAP SERPL CALCULATED.3IONS-SCNC: 9 MMOL/L (ref 7–16)
AST SERPL-CCNC: 12 U/L (ref 0–31)
BACTERIA: NORMAL /HPF
BASOPHILS ABSOLUTE: 0.04 E9/L (ref 0–0.2)
BASOPHILS RELATIVE PERCENT: 0.6 % (ref 0–2)
BILIRUB SERPL-MCNC: 0.2 MG/DL (ref 0–1.2)
BILIRUBIN URINE: NEGATIVE
BLOOD, URINE: NEGATIVE
BUN BLDV-MCNC: 13 MG/DL (ref 6–23)
CALCIUM SERPL-MCNC: 8.8 MG/DL (ref 8.6–10.2)
CHLORIDE BLD-SCNC: 102 MMOL/L (ref 98–107)
CLARITY: CLEAR
CO2: 28 MMOL/L (ref 22–29)
COLOR: YELLOW
CREAT SERPL-MCNC: 0.8 MG/DL (ref 0.5–1)
EOSINOPHILS ABSOLUTE: 0.07 E9/L (ref 0.05–0.5)
EOSINOPHILS RELATIVE PERCENT: 1.1 % (ref 0–6)
EPITHELIAL CELLS, UA: NORMAL /HPF
GFR AFRICAN AMERICAN: >60
GFR NON-AFRICAN AMERICAN: >60 ML/MIN/1.73
GLUCOSE BLD-MCNC: 151 MG/DL (ref 74–99)
GLUCOSE URINE: NEGATIVE MG/DL
HCT VFR BLD CALC: 37.6 % (ref 34–48)
HEMOGLOBIN: 11.9 G/DL (ref 11.5–15.5)
IMMATURE GRANULOCYTES #: 0.04 E9/L
IMMATURE GRANULOCYTES %: 0.6 % (ref 0–5)
KETONES, URINE: NEGATIVE MG/DL
LEUKOCYTE ESTERASE, URINE: NEGATIVE
LYMPHOCYTES ABSOLUTE: 1.41 E9/L (ref 1.5–4)
LYMPHOCYTES RELATIVE PERCENT: 22.1 % (ref 20–42)
MCH RBC QN AUTO: 27.5 PG (ref 26–35)
MCHC RBC AUTO-ENTMCNC: 31.6 % (ref 32–34.5)
MCV RBC AUTO: 87 FL (ref 80–99.9)
MONOCYTES ABSOLUTE: 0.3 E9/L (ref 0.1–0.95)
MONOCYTES RELATIVE PERCENT: 4.7 % (ref 2–12)
NEUTROPHILS ABSOLUTE: 4.52 E9/L (ref 1.8–7.3)
NEUTROPHILS RELATIVE PERCENT: 70.9 % (ref 43–80)
NITRITE, URINE: NEGATIVE
PDW BLD-RTO: 14.5 FL (ref 11.5–15)
PH UA: 7 (ref 5–9)
PLATELET # BLD: 194 E9/L (ref 130–450)
PMV BLD AUTO: 9 FL (ref 7–12)
POTASSIUM REFLEX MAGNESIUM: 3.9 MMOL/L (ref 3.5–5)
PROTEIN UA: NORMAL MG/DL
RBC # BLD: 4.32 E12/L (ref 3.5–5.5)
RBC UA: NORMAL /HPF (ref 0–2)
SODIUM BLD-SCNC: 139 MMOL/L (ref 132–146)
SPECIFIC GRAVITY UA: 1.01 (ref 1–1.03)
TOTAL PROTEIN: 6.3 G/DL (ref 6.4–8.3)
TROPONIN, HIGH SENSITIVITY: 11 NG/L (ref 0–9)
TROPONIN, HIGH SENSITIVITY: 12 NG/L (ref 0–9)
UROBILINOGEN, URINE: 0.2 E.U./DL
WBC # BLD: 6.4 E9/L (ref 4.5–11.5)
WBC UA: NORMAL /HPF (ref 0–5)

## 2021-10-23 PROCEDURE — 99285 EMERGENCY DEPT VISIT HI MDM: CPT

## 2021-10-23 PROCEDURE — 80053 COMPREHEN METABOLIC PANEL: CPT

## 2021-10-23 PROCEDURE — 99220 PR INITIAL OBSERVATION CARE/DAY 70 MINUTES: CPT | Performed by: INTERNAL MEDICINE

## 2021-10-23 PROCEDURE — 87088 URINE BACTERIA CULTURE: CPT

## 2021-10-23 PROCEDURE — 71045 X-RAY EXAM CHEST 1 VIEW: CPT

## 2021-10-23 PROCEDURE — 70450 CT HEAD/BRAIN W/O DYE: CPT

## 2021-10-23 PROCEDURE — 81001 URINALYSIS AUTO W/SCOPE: CPT

## 2021-10-23 PROCEDURE — G0378 HOSPITAL OBSERVATION PER HR: HCPCS

## 2021-10-23 PROCEDURE — 85025 COMPLETE CBC W/AUTO DIFF WBC: CPT

## 2021-10-23 PROCEDURE — 84484 ASSAY OF TROPONIN QUANT: CPT

## 2021-10-23 PROCEDURE — 93005 ELECTROCARDIOGRAM TRACING: CPT | Performed by: STUDENT IN AN ORGANIZED HEALTH CARE EDUCATION/TRAINING PROGRAM

## 2021-10-23 ASSESSMENT — ENCOUNTER SYMPTOMS
CHEST TIGHTNESS: 0
DIARRHEA: 0
NAUSEA: 0
BACK PAIN: 0
VOMITING: 0
ABDOMINAL PAIN: 0
SHORTNESS OF BREATH: 0
COUGH: 0
SORE THROAT: 0
ABDOMINAL DISTENTION: 0

## 2021-10-23 NOTE — ED PROVIDER NOTES
HPI     Patient is a 69 y.o. female presents with a chief complaint of fatigue  This has been occurring for one day. Patient states that it gets better with nothing. Patient states that it gets worse with nothign. Patient states that it is mild in severity. Patient presents with a chief complaint of fatigue. Patient states that she has a history of Parkinson's disease and was concerned that her Parkinson's is flaring up. Patient states that she has no chest pain or shortness of breath. Patient is denying any fevers or chills . Patient states that it felt like her legs were weak and they were going to give out on her. Patient also notes that she was concerned that she was going to fall. Patient states that she did not fall. Patient did not hit her head or lose consciousness. Patient denies any fevers, chills, nausea, vomiting, chest pain, shortness of breath, abdominal pain, changes in urinary or bowel habits. Review of Systems   Constitutional: Positive for fatigue. Negative for activity change, appetite change, chills and fever. HENT: Negative for congestion, drooling and sore throat. Respiratory: Negative for cough, chest tightness and shortness of breath. Cardiovascular: Negative for chest pain and palpitations. Gastrointestinal: Negative for abdominal distention, abdominal pain, diarrhea, nausea and vomiting. Genitourinary: Negative for decreased urine volume, difficulty urinating, enuresis, flank pain, frequency and hematuria. Musculoskeletal: Negative for arthralgias, back pain and neck stiffness. Skin: Negative for rash and wound. Neurological: Negative for dizziness, facial asymmetry, light-headedness and headaches. Psychiatric/Behavioral: Negative for agitation, confusion and decreased concentration. Physical Exam  Vitals and nursing note reviewed. Constitutional:       Appearance: She is well-developed. HENT:      Head: Normocephalic and atraumatic. Eyes:      Conjunctiva/sclera: Conjunctivae normal.   Cardiovascular:      Rate and Rhythm: Normal rate and regular rhythm. Heart sounds: Normal heart sounds. No murmur heard. Pulmonary:      Effort: Pulmonary effort is normal. No respiratory distress. Breath sounds: Normal breath sounds. No wheezing or rales. Abdominal:      General: Bowel sounds are normal.      Palpations: Abdomen is soft. Tenderness: There is no abdominal tenderness. There is no guarding or rebound. Musculoskeletal:      Cervical back: Normal range of motion and neck supple. Skin:     General: Skin is warm and dry. Neurological:      Mental Status: She is alert and oriented to person, place, and time. Cranial Nerves: No cranial nerve deficit. Coordination: Coordination normal.      Comments: Baseline resting tremor. Procedures     [facility]     ED Course as of Oct 24 0958   Sat Oct 23, 2021   2005 EKG read interpreted by me. Rate 85 bpm.  Normal axis. Normal sinus rhythm. No ST elevations or depressions. Normal QRS. Otherwise normal EKG. [JM]   2114 Spoke to Hospitalist, he wanted me to clarify with the patient's PCP on file is the correct PCP prior to him accepting patient. Patient confirms Dr. Kelly Oropeza is her PCP. Hospitalist called back he will now admit patient. [TC]      ED Course User Index  [JM] MD Marlene Child MD      Patient is a 69 y.o. female presenting with inability to ambulate. Patient stated that she is very shaky on her feet and feels like she is going to fall. Patient states that she is concerned to walk. Patient labs drawn. Patient's labs are initially benign. Patient had a urine analysis. Patient's urinalysis is normal.  Patient had an elevated troponin. Patient had delta troponin drawn. Patient's initial EKG is benign. Patient stated that she does not feel safe at home but will go to a rehab.   Patient was handed off to oncoming physician.: Discussed with internal medicine agreed to admit patient. .       Patient was seen and evaluated by myself and my attending Antoni Chaudhari MD. Assessment and Plan discussed with attending provider, please see attestation for final plan of care. This note was done using dictation software and there may be some grammatical errors associated with this. Lexy Joseph MD     ED Course as of Oct 24 1018   Sat Oct 23, 2021   2005 EKG read interpreted by me. Rate 85 bpm.  Normal axis. Normal sinus rhythm. No ST elevations or depressions. Normal QRS. Otherwise normal EKG. [JM]   2114 Spoke to Hospitalist, he wanted me to clarify with the patient's PCP on file is the correct PCP prior to him accepting patient. Patient confirms Dr. Yaneth Cabrera is her PCP. Hospitalist called back he will now admit patient. [TC]      ED Course User Index  [JM] Lexy Joseph MD  [TC] Chester Monson MD       --------------------------------------------- PAST HISTORY ---------------------------------------------  Past Medical History:  has a past medical history of Anxiety, Asthma, CAD (coronary artery disease), Cancer ([facility] [address] Chronic kidney disease, Depression, Diabetes mellitus ([facility] [address] H/O mammogram, Hx MRSA infection, Hyperlipidemia, Hypertension, Lateral epicondylitis, SERENA on CPAP, and Tubal ligation status. Past Surgical History:  has a past surgical history that includes Gallbladder surgery; Breast lumpectomy; Breast reduction surgery; Toe amputation; Cholecystectomy; Cardiac catheterization (4/28/2014); Colonoscopy (7/29/15); Endoscopy, colon, diagnostic (7/19/15); Upper gastrointestinal endoscopy; lumbar laminectomy; and Tonsillectomy. Social History:  reports that she has never smoked. She has never used smokeless tobacco. She reports that she does not drink alcohol and does not use drugs. Family History: family history includes Cancer in her father and mother; Diabetes in her sister;  Heart Disease in her sister; Other in her brother; Seizures in her son. The patients home medications have been reviewed.     Allergies: Ciprofloxacin and Codeine    -------------------------------------------------- RESULTS -------------------------------------------------    LABS:  Results for orders placed or performed during the hospital encounter of 10/23/21   CBC Auto Differential   Result Value Ref Range    WBC 6.4 4.5 - 11.5 E9/L    RBC 4.32 3.50 - 5.50 E12/L    Hemoglobin 11.9 11.5 - 15.5 g/dL    Hematocrit 37.6 34.0 - 48.0 %    MCV 87.0 80.0 - 99.9 fL    MCH 27.5 26.0 - 35.0 pg    MCHC 31.6 (L) 32.0 - 34.5 %    RDW 14.5 11.5 - 15.0 fL    Platelets 248 389 - 322 E9/L    MPV 9.0 7.0 - 12.0 fL    Neutrophils % 70.9 43.0 - 80.0 %    Immature Granulocytes % 0.6 0.0 - 5.0 %    Lymphocytes % 22.1 20.0 - 42.0 %    Monocytes % 4.7 2.0 - 12.0 %    Eosinophils % 1.1 0.0 - 6.0 %    Basophils % 0.6 0.0 - 2.0 %    Neutrophils Absolute 4.52 1.80 - 7.30 E9/L    Immature Granulocytes # 0.04 E9/L    Lymphocytes Absolute 1.41 (L) 1.50 - 4.00 E9/L    Monocytes Absolute 0.30 0.10 - 0.95 E9/L    Eosinophils Absolute 0.07 0.05 - 0.50 E9/L    Basophils Absolute 0.04 0.00 - 0.20 E9/L   Comprehensive Metabolic Panel w/ Reflex to MG   Result Value Ref Range    Sodium 139 132 - 146 mmol/L    Potassium reflex Magnesium 3.9 3.5 - 5.0 mmol/L    Chloride 102 98 - 107 mmol/L    CO2 28 22 - 29 mmol/L    Anion Gap 9 7 - 16 mmol/L    Glucose 151 (H) 74 - 99 mg/dL    BUN 13 6 - 23 mg/dL    CREATININE 0.8 0.5 - 1.0 mg/dL    GFR Non-African American >60 >=60 mL/min/1.73    GFR African American >60     Calcium 8.8 8.6 - 10.2 mg/dL    Total Protein 6.3 (L) 6.4 - 8.3 g/dL    Albumin 3.9 3.5 - 5.2 g/dL    Total Bilirubin 0.2 0.0 - 1.2 mg/dL    Alkaline Phosphatase 99 35 - 104 U/L    ALT <5 0 - 32 U/L    AST 12 0 - 31 U/L   Troponin   Result Value Ref Range    Troponin, High Sensitivity 12 (H) 0 - 9 ng/L   Urinalysis, reflex to microscopic   Result Value Ref Range    Color, UA Yellow Straw/Yellow    Clarity, UA Clear Clear    Glucose, Ur Negative Negative mg/dL    Bilirubin Urine Negative Negative    Ketones, Urine Negative Negative mg/dL    Specific Gravity, UA 1.015 1.005 - 1.030    Blood, Urine Negative Negative    pH, UA 7.0 5.0 - 9.0    Protein, UA TRACE Negative mg/dL    Urobilinogen, Urine 0.2 <2.0 E.U./dL    Nitrite, Urine Negative Negative    Leukocyte Esterase, Urine Negative Negative   Microscopic Urinalysis   Result Value Ref Range    WBC, UA 0-1 0 - 5 /HPF    RBC, UA NONE 0 - 2 /HPF    Epithelial Cells, UA NONE SEEN /HPF    Bacteria, UA NONE SEEN None Seen /HPF   Troponin   Result Value Ref Range    Troponin, High Sensitivity 11 (H) 0 - 9 ng/L   POCT Glucose   Result Value Ref Range    Meter Glucose 201 (H) 74 - 99 mg/dL   POCT Glucose   Result Value Ref Range    Meter Glucose 161 (H) 74 - 99 mg/dL   EKG 12 Lead   Result Value Ref Range    Ventricular Rate 85 BPM    Atrial Rate 85 BPM    P-R Interval 180 ms    QRS Duration 90 ms    Q-T Interval 398 ms    QTc Calculation (Bazett) 473 ms    P Axis 64 degrees    R Axis 67 degrees    T Axis 70 degrees       RADIOLOGY:  CT Head WO Contrast   Final Result   No acute intracranial abnormality. XR CHEST PORTABLE   Final Result   No acute process. ------------------------- NURSING NOTES AND VITALS REVIEWED ---------------------------  Date / Time Roomed:  10/23/2021  6:03 PM  ED Bed Assignment:  [id number]    The nursing notes within the ED encounter and vital signs as below have been reviewed.      Patient Vitals for the past 24 hrs:   BP Temp Temp src Pulse Resp SpO2 Height Weight   10/24/21 0730 (!) 185/82 98.5 °F (36.9 °C) Oral 71 16 95 % -- --   10/24/21 0130 (!) 158/73 97.8 °F (36.6 °C) Oral 75 18 -- -- --   10/23/21 2356 (!) 170/85 -- -- 80 14 93 % -- --   10/23/21 2237 (!) 181/89 98 °F (36.7 °C) Oral 79 16 94 % -- --   10/23/21 2000 (!) 162/60 98.6 °F (37 °C) -- 82 18 95 % -- --   10/23/21 1815 (!) 176/81 98.9 °F (37.2 °C) Oral 87 18 95 % 5' (1.524 m) 200 lb (90.7 kg)       Oxygen Saturation Interpretation: Normal    ------------------------------------------ PROGRESS NOTES ------------------------------------------  See ED course for reevaluation    Counseling:  I have spoken with the patient and discussed todays results, in addition to providing specific details for the plan of care and counseling regarding the diagnosis and prognosis. Their questions are answered at this time and they are agreeable with the plan of admission.    --------------------------------- ADDITIONAL PROVIDER NOTES ---------------------------------  Consultations:   Spoke with Dr. Oskar Moeller. Discussed case. They will admit the patient. This patient's ED course included: a personal history and physicial examination, re-evaluation prior to disposition and multiple bedside re-evaluations    This patient has remained hemodynamically stable during their ED course. Diagnosis:  1. Inability to walk    2.  Generalized weakness        Disposition:  Patient's disposition: Admit to med/surg floor  Patient's condition is Stable         Shayla White MD  Resident  10/24/21 8115

## 2021-10-23 NOTE — ED NOTES
Bed: 09  Expected date:   Expected time:   Means of arrival:   Comments:  MAHESH Cuenca, RN  10/23/21 8647

## 2021-10-24 LAB
EKG ATRIAL RATE: 85 BPM
EKG P AXIS: 64 DEGREES
EKG P-R INTERVAL: 180 MS
EKG Q-T INTERVAL: 398 MS
EKG QRS DURATION: 90 MS
EKG QTC CALCULATION (BAZETT): 473 MS
EKG R AXIS: 67 DEGREES
EKG T AXIS: 70 DEGREES
EKG VENTRICULAR RATE: 85 BPM
METER GLUCOSE: 113 MG/DL (ref 74–99)
METER GLUCOSE: 126 MG/DL (ref 74–99)
METER GLUCOSE: 149 MG/DL (ref 74–99)
METER GLUCOSE: 161 MG/DL (ref 74–99)
METER GLUCOSE: 201 MG/DL (ref 74–99)
SEDIMENTATION RATE, ERYTHROCYTE: 15 MM/HR (ref 0–20)

## 2021-10-24 PROCEDURE — 82962 GLUCOSE BLOOD TEST: CPT

## 2021-10-24 PROCEDURE — G0378 HOSPITAL OBSERVATION PER HR: HCPCS

## 2021-10-24 PROCEDURE — 85651 RBC SED RATE NONAUTOMATED: CPT

## 2021-10-24 PROCEDURE — 82306 VITAMIN D 25 HYDROXY: CPT

## 2021-10-24 PROCEDURE — 36415 COLL VENOUS BLD VENIPUNCTURE: CPT

## 2021-10-24 PROCEDURE — 6360000002 HC RX W HCPCS: Performed by: INTERNAL MEDICINE

## 2021-10-24 PROCEDURE — 6360000002 HC RX W HCPCS: Performed by: NURSE PRACTITIONER

## 2021-10-24 PROCEDURE — 83735 ASSAY OF MAGNESIUM: CPT

## 2021-10-24 PROCEDURE — 82607 VITAMIN B-12: CPT

## 2021-10-24 PROCEDURE — 99226 PR SBSQ OBSERVATION CARE/DAY 35 MINUTES: CPT | Performed by: FAMILY MEDICINE

## 2021-10-24 PROCEDURE — APPSS30 APP SPLIT SHARED TIME 16-30 MINUTES: Performed by: NURSE PRACTITIONER

## 2021-10-24 PROCEDURE — 2580000003 HC RX 258: Performed by: INTERNAL MEDICINE

## 2021-10-24 PROCEDURE — 82746 ASSAY OF FOLIC ACID SERUM: CPT

## 2021-10-24 PROCEDURE — 6370000000 HC RX 637 (ALT 250 FOR IP): Performed by: FAMILY MEDICINE

## 2021-10-24 PROCEDURE — 83036 HEMOGLOBIN GLYCOSYLATED A1C: CPT

## 2021-10-24 PROCEDURE — 96374 THER/PROPH/DIAG INJ IV PUSH: CPT

## 2021-10-24 PROCEDURE — 6370000000 HC RX 637 (ALT 250 FOR IP): Performed by: INTERNAL MEDICINE

## 2021-10-24 PROCEDURE — 87040 BLOOD CULTURE FOR BACTERIA: CPT

## 2021-10-24 PROCEDURE — 96372 THER/PROPH/DIAG INJ SC/IM: CPT

## 2021-10-24 RX ORDER — ACETAMINOPHEN 325 MG/1
650 TABLET ORAL EVERY 6 HOURS PRN
Status: DISCONTINUED | OUTPATIENT
Start: 2021-10-24 | End: 2021-10-25 | Stop reason: HOSPADM

## 2021-10-24 RX ORDER — CARBIDOPA AND LEVODOPA 25; 100 MG/1; MG/1
2 TABLET, EXTENDED RELEASE ORAL 3 TIMES DAILY
Status: DISCONTINUED | OUTPATIENT
Start: 2021-10-24 | End: 2021-10-25 | Stop reason: HOSPADM

## 2021-10-24 RX ORDER — ROPINIROLE 0.5 MG/1
0.5 TABLET, FILM COATED ORAL 3 TIMES DAILY
Status: DISCONTINUED | OUTPATIENT
Start: 2021-10-24 | End: 2021-10-25 | Stop reason: HOSPADM

## 2021-10-24 RX ORDER — POLYETHYLENE GLYCOL 3350 17 G/17G
17 POWDER, FOR SOLUTION ORAL DAILY PRN
Status: DISCONTINUED | OUTPATIENT
Start: 2021-10-24 | End: 2021-10-25 | Stop reason: HOSPADM

## 2021-10-24 RX ORDER — DEXTROSE MONOHYDRATE 25 G/50ML
12.5 INJECTION, SOLUTION INTRAVENOUS PRN
Status: DISCONTINUED | OUTPATIENT
Start: 2021-10-24 | End: 2021-10-25 | Stop reason: HOSPADM

## 2021-10-24 RX ORDER — HYDRALAZINE HYDROCHLORIDE 20 MG/ML
10 INJECTION INTRAMUSCULAR; INTRAVENOUS EVERY 6 HOURS PRN
Status: DISCONTINUED | OUTPATIENT
Start: 2021-10-24 | End: 2021-10-25 | Stop reason: HOSPADM

## 2021-10-24 RX ORDER — DOXEPIN HYDROCHLORIDE 10 MG/1
10 CAPSULE ORAL NIGHTLY
Status: DISCONTINUED | OUTPATIENT
Start: 2021-10-24 | End: 2021-10-25 | Stop reason: HOSPADM

## 2021-10-24 RX ORDER — DEXTROSE MONOHYDRATE 50 MG/ML
100 INJECTION, SOLUTION INTRAVENOUS PRN
Status: DISCONTINUED | OUTPATIENT
Start: 2021-10-24 | End: 2021-10-25 | Stop reason: HOSPADM

## 2021-10-24 RX ORDER — SODIUM CHLORIDE 0.9 % (FLUSH) 0.9 %
10 SYRINGE (ML) INJECTION EVERY 12 HOURS SCHEDULED
Status: DISCONTINUED | OUTPATIENT
Start: 2021-10-24 | End: 2021-10-25 | Stop reason: HOSPADM

## 2021-10-24 RX ORDER — ATORVASTATIN CALCIUM 20 MG/1
20 TABLET, FILM COATED ORAL DAILY
Status: DISCONTINUED | OUTPATIENT
Start: 2021-10-24 | End: 2021-10-25 | Stop reason: HOSPADM

## 2021-10-24 RX ORDER — INSULIN GLARGINE 100 [IU]/ML
40 INJECTION, SOLUTION SUBCUTANEOUS NIGHTLY
Status: DISCONTINUED | OUTPATIENT
Start: 2021-10-24 | End: 2021-10-25 | Stop reason: HOSPADM

## 2021-10-24 RX ORDER — MULTIVITAMIN WITH IRON
1 TABLET ORAL DAILY
Status: DISCONTINUED | OUTPATIENT
Start: 2021-10-24 | End: 2021-10-25 | Stop reason: HOSPADM

## 2021-10-24 RX ORDER — LOSARTAN POTASSIUM 50 MG/1
50 TABLET ORAL DAILY
Status: DISCONTINUED | OUTPATIENT
Start: 2021-10-24 | End: 2021-10-25 | Stop reason: HOSPADM

## 2021-10-24 RX ORDER — PANTOPRAZOLE SODIUM 40 MG/1
40 TABLET, DELAYED RELEASE ORAL
Status: DISCONTINUED | OUTPATIENT
Start: 2021-10-24 | End: 2021-10-25 | Stop reason: HOSPADM

## 2021-10-24 RX ORDER — SODIUM CHLORIDE 0.9 % (FLUSH) 0.9 %
10 SYRINGE (ML) INJECTION PRN
Status: DISCONTINUED | OUTPATIENT
Start: 2021-10-24 | End: 2021-10-25 | Stop reason: HOSPADM

## 2021-10-24 RX ORDER — VENLAFAXINE HYDROCHLORIDE 150 MG/1
150 CAPSULE, EXTENDED RELEASE ORAL DAILY
Status: DISCONTINUED | OUTPATIENT
Start: 2021-10-24 | End: 2021-10-25 | Stop reason: HOSPADM

## 2021-10-24 RX ORDER — ACETAMINOPHEN 650 MG/1
650 SUPPOSITORY RECTAL EVERY 6 HOURS PRN
Status: DISCONTINUED | OUTPATIENT
Start: 2021-10-24 | End: 2021-10-25 | Stop reason: HOSPADM

## 2021-10-24 RX ORDER — ASPIRIN 81 MG/1
81 TABLET, CHEWABLE ORAL NIGHTLY
Status: DISCONTINUED | OUTPATIENT
Start: 2021-10-24 | End: 2021-10-25 | Stop reason: HOSPADM

## 2021-10-24 RX ORDER — SODIUM CHLORIDE 9 MG/ML
25 INJECTION, SOLUTION INTRAVENOUS PRN
Status: DISCONTINUED | OUTPATIENT
Start: 2021-10-24 | End: 2021-10-25 | Stop reason: HOSPADM

## 2021-10-24 RX ORDER — SODIUM CHLORIDE 9 MG/ML
INJECTION, SOLUTION INTRAVENOUS CONTINUOUS
Status: DISCONTINUED | OUTPATIENT
Start: 2021-10-24 | End: 2021-10-25

## 2021-10-24 RX ORDER — BUSPIRONE HYDROCHLORIDE 5 MG/1
5 TABLET ORAL 3 TIMES DAILY
Status: DISCONTINUED | OUTPATIENT
Start: 2021-10-24 | End: 2021-10-25 | Stop reason: HOSPADM

## 2021-10-24 RX ORDER — LAMOTRIGINE 100 MG/1
200 TABLET ORAL DAILY
Status: DISCONTINUED | OUTPATIENT
Start: 2021-10-24 | End: 2021-10-25 | Stop reason: HOSPADM

## 2021-10-24 RX ORDER — NICOTINE POLACRILEX 4 MG
15 LOZENGE BUCCAL PRN
Status: DISCONTINUED | OUTPATIENT
Start: 2021-10-24 | End: 2021-10-25 | Stop reason: HOSPADM

## 2021-10-24 RX ADMIN — ENOXAPARIN SODIUM 40 MG: 40 INJECTION SUBCUTANEOUS at 08:18

## 2021-10-24 RX ADMIN — ROPINIROLE HYDROCHLORIDE 0.5 MG: 0.5 TABLET, FILM COATED ORAL at 08:17

## 2021-10-24 RX ADMIN — VENLAFAXINE HYDROCHLORIDE 150 MG: 150 CAPSULE, EXTENDED RELEASE ORAL at 08:17

## 2021-10-24 RX ADMIN — ACETAMINOPHEN 650 MG: 325 TABLET ORAL at 20:57

## 2021-10-24 RX ADMIN — ACETAMINOPHEN 650 MG: 325 TABLET ORAL at 10:52

## 2021-10-24 RX ADMIN — BUSPIRONE HYDROCHLORIDE 5 MG: 5 TABLET ORAL at 14:20

## 2021-10-24 RX ADMIN — ASPIRIN 81 MG CHEWABLE TABLET 81 MG: 81 TABLET CHEWABLE at 20:57

## 2021-10-24 RX ADMIN — LOSARTAN POTASSIUM 50 MG: 50 TABLET, FILM COATED ORAL at 08:17

## 2021-10-24 RX ADMIN — PANTOPRAZOLE SODIUM 40 MG: 40 TABLET, DELAYED RELEASE ORAL at 06:58

## 2021-10-24 RX ADMIN — CARBIDOPA AND LEVODOPA 2 TABLET: 25; 100 TABLET, EXTENDED RELEASE ORAL at 08:17

## 2021-10-24 RX ADMIN — ATORVASTATIN CALCIUM 20 MG: 20 TABLET, FILM COATED ORAL at 20:57

## 2021-10-24 RX ADMIN — DOXEPIN HYDROCHLORIDE 10 MG: 10 CAPSULE ORAL at 20:57

## 2021-10-24 RX ADMIN — INSULIN LISPRO 8 UNITS: 100 INJECTION, SOLUTION INTRAVENOUS; SUBCUTANEOUS at 12:10

## 2021-10-24 RX ADMIN — MULTIVITAMIN TABLET 1 TABLET: TABLET at 14:20

## 2021-10-24 RX ADMIN — ROPINIROLE HYDROCHLORIDE 0.5 MG: 0.5 TABLET, FILM COATED ORAL at 20:57

## 2021-10-24 RX ADMIN — INSULIN LISPRO 1 UNITS: 100 INJECTION, SOLUTION INTRAVENOUS; SUBCUTANEOUS at 08:23

## 2021-10-24 RX ADMIN — ROPINIROLE HYDROCHLORIDE 0.5 MG: 0.5 TABLET, FILM COATED ORAL at 14:21

## 2021-10-24 RX ADMIN — CARBIDOPA AND LEVODOPA 2 TABLET: 25; 100 TABLET, EXTENDED RELEASE ORAL at 14:21

## 2021-10-24 RX ADMIN — HYDRALAZINE HYDROCHLORIDE 10 MG: 20 INJECTION INTRAMUSCULAR; INTRAVENOUS at 16:24

## 2021-10-24 RX ADMIN — SODIUM CHLORIDE: 9 INJECTION, SOLUTION INTRAVENOUS at 06:58

## 2021-10-24 RX ADMIN — BUSPIRONE HYDROCHLORIDE 5 MG: 5 TABLET ORAL at 20:57

## 2021-10-24 RX ADMIN — INSULIN LISPRO 8 UNITS: 100 INJECTION, SOLUTION INTRAVENOUS; SUBCUTANEOUS at 08:23

## 2021-10-24 RX ADMIN — CARBIDOPA AND LEVODOPA 2 TABLET: 25; 100 TABLET, EXTENDED RELEASE ORAL at 20:57

## 2021-10-24 RX ADMIN — Medication 10 ML: at 20:58

## 2021-10-24 RX ADMIN — INSULIN LISPRO 8 UNITS: 100 INJECTION, SOLUTION INTRAVENOUS; SUBCUTANEOUS at 16:38

## 2021-10-24 RX ADMIN — INSULIN LISPRO 1 UNITS: 100 INJECTION, SOLUTION INTRAVENOUS; SUBCUTANEOUS at 12:11

## 2021-10-24 RX ADMIN — LAMOTRIGINE 200 MG: 100 TABLET ORAL at 08:17

## 2021-10-24 ASSESSMENT — PAIN SCALES - GENERAL
PAINLEVEL_OUTOF10: 4
PAINLEVEL_OUTOF10: 7
PAINLEVEL_OUTOF10: 0
PAINLEVEL_OUTOF10: 0

## 2021-10-24 ASSESSMENT — PAIN DESCRIPTION - LOCATION: LOCATION: HEAD

## 2021-10-24 ASSESSMENT — PAIN DESCRIPTION - PAIN TYPE: TYPE: ACUTE PAIN

## 2021-10-24 NOTE — PROGRESS NOTES
[address] [facility] Hospitalist   Progress Note    Admitting Date and Time: 10/23/2021  6:03 PM  Admit Dx: Generalized weakness [R53.1]    Subjective:    10/24 Pt lying in bed in no acute distress. States she has HA today. Requesting tylenol. States weakness beginning yesterday. Labs pending. Uses walker with ambulation. No new concerns noted. Per RN: No new concerns noted. ROS: denies fever, chills, cp, sob, n/v, HA unless stated above.      atorvastatin  20 mg Oral Daily    aspirin  81 mg Oral Nightly    carbidopa-levodopa  2 tablet Oral TID    doxepin  10 mg Oral Nightly    insulin glargine  40 Units SubCUTAneous Nightly    lamoTRIgine  200 mg Oral Daily    losartan  50 mg Oral Daily    pantoprazole  40 mg Oral QAM AC    rOPINIRole  0.5 mg Oral TID    venlafaxine  150 mg Oral Daily    sodium chloride flush  10 mL IntraVENous 2 times per day    enoxaparin  40 mg SubCUTAneous Daily    insulin lispro  8 Units SubCUTAneous TID WC    insulin lispro  0-6 Units SubCUTAneous TID WC    insulin lispro  0-3 Units SubCUTAneous Nightly     sodium chloride flush, 10 mL, PRN  sodium chloride, 25 mL, PRN  polyethylene glycol, 17 g, Daily PRN  acetaminophen, 650 mg, Q6H PRN   Or  acetaminophen, 650 mg, Q6H PRN  glucose, 15 g, PRN  dextrose, 12.5 g, PRN  glucagon (rDNA), 1 mg, PRN  dextrose, 100 mL/hr, PRN         Objective:    BP (!) 185/82   Pulse 71   Temp 98.5 °F (36.9 °C) (Oral)   Resp 16   Ht 5' (1.524 m)   Wt 200 lb (90.7 kg)   SpO2 95%   BMI 39.06 kg/m²   General Appearance: alert and oriented to person, place and time and in no acute distress  Skin: warm and dry  Head: normocephalic and atraumatic  Eyes: pupils equal, round, and reactive to light, extraocular eye movements intact, conjunctivae normal  Neck: neck supple and non tender without mass   Pulmonary/Chest: clear/Diminished to auscultation bilaterally- no wheezes, rales or rhonchi, normal air movement, no respiratory involving LAD, without significant angiographic luminal narrowing, widely patent rest of the artery. Per Dr. Oly Davidson  8. Hx CAD- Continue ASA/Statin. Denies CP. 9. HX SERENA- CPAP    NOTE: This report was transcribed using voice recognition software. Every effort was made to ensure accuracy; however, inadvertent computerized transcription errors may be present. Electronically signed by Radha Noyola CNP on 10/24/2021 at 9:38 AM

## 2021-10-24 NOTE — PROGRESS NOTES
The combination of buspirone with doxepin and venlafaxine may cause serotonin syndrome. Please monitor patient for signs/symptoms of serotonin syndrome.   Tiffany Gray RPh 10/24/2021 11:51 AM

## 2021-10-24 NOTE — H&P
[facility] History and Physical      CHIEF COMPLAINT: Generalized weakness    History of Present Illness: 69-year-old female with history of Parkinson's disease, essential hypertension, diabetes mellitus type 2, atherosclerotic heart disease. Earlier this year she was found to have MSSA bacteremia, iliopsoas abscess status post drain, osteomyelitis of the sacroiliac joint. Also on IV nafcillin, follow-up MRI showed resolution of the abscess but still had persistent inflammatory changes. She reports doing well and even moved to an assisted living recently. However today when she got up to move around she has significant generalized weakness. She had to walk slowly and went straight to her couch. Presented to the ED due to inability to ambulate due to her weakness.   Denies any fever chills, no nausea vomiting, no diarrhea constipation, no urinary issues, no back pain      REVIEW OF SYSTEMS:  A comprehensive 14 point review of systems was negative except for: what is in the HPI    PMH:  Past Medical History:   Diagnosis Date    Anxiety     Asthma     CAD (coronary artery disease) 1/21/2016    Cancer ([facility] [address]  breast ca 2006    Chronic kidney disease     nephrolithiasis    Depression     Diabetes mellitus ([facility] [address]     H/O mammogram     Hx MRSA infection     toe infection january 2012    Hyperlipidemia     Hypertension     Lateral epicondylitis     SERENA on CPAP     Tubal ligation status        Surgical History:  Past Surgical History:   Procedure Laterality Date    BREAST LUMPECTOMY      BREAST REDUCTION SURGERY      CARDIAC CATHETERIZATION  4/28/2014    Dr. Kami Romo  7/29/15    ENDOSCOPY, COLON, DIAGNOSTIC  7/19/15    GALLBLADDER SURGERY      LUMBAR LAMINECTOMY      TOE AMPUTATION      TONSILLECTOMY      UPPER GASTROINTESTINAL ENDOSCOPY         Medications Prior to Admission:    Prior to Admission medications    Medication Sig Start Date End Date Taking? Authorizing Provider   carbidopa-levodopa (SINEMET CR)  MG per extended release tablet Take 1 tablet by mouth 3 times daily 9/16/21   ERIC Gan   cephALEXin (KEFLEX) 500 MG capsule Take 1 capsule by mouth 2 times daily 8/27/21 11/25/21  Bobby Dalton MD   doxepin (SINEQUAN) 10 MG capsule Take 10 mg by mouth nightly    Historical Provider, MD   venlafaxine (EFFEXOR XR) 150 MG extended release capsule Take 150 mg by mouth daily    Historical Provider, MD   ALPRAZolam Jackqueline Jaylon) 0.5 MG tablet Take 0.5 mg by mouth daily. Historical Provider, MD   losartan (COZAAR) 50 MG tablet Take 50 mg by mouth daily    Historical Provider, MD   rOPINIRole (REQUIP) 0.5 MG tablet Take 1 tablet by mouth every morning AND 1 tablet Daily with lunch AND 2 tablets nightly. 8/11/21   Jesus Mole, APRN - CNP   omeprazole (PRILOSEC) 40 MG delayed release capsule Take 40 mg by mouth daily     Historical Provider, MD   insulin glargine (LANTUS SOLOSTAR) 100 UNIT/ML injection pen Inject 80 Units into the skin nightly  Patient taking differently: Inject 40 Units into the skin nightly  4/10/20   Marjorie Alcantara MD   blood glucose test strips (ACCU-CHEK RIGOBERTO PLUS) strip 1 each by In Vitro route 2 times daily Indications: DISP ACCU-CHEK As needed. Historical Provider, MD   lamoTRIgine (LAMICTAL) 200 MG tablet Take 200 mg by mouth daily  10/18/19   Historical Provider, MD   atorvastatin (LIPITOR) 20 MG tablet Take 1 tablet by mouth daily 10/21/19   Marjorie Alcantara MD   insulin aspart (NOVOLOG FLEXPEN) 100 UNIT/ML injection pen INJECT 0-10 UNITS INTO THE SKIN THREE TIMES DAILY(BEFORE MEALS) SLIDING SCALE (MAX DAILY 30 UNITS)  Patient taking differently: Indications: med.  approved 6/1/19-7/29/20 INJECT 0-10 UNITS INTO THE SKIN THREE TIMES DAILY(BEFORE MEALS) SLIDING SCALE (MAX DAILY 30 UNITS) 7/29/19   Marjorie Alcantara MD   aspirin 81 MG tablet Take 81 mg by mouth nightly    Historical Provider, MD Allergies:    Ciprofloxacin and Codeine    Social History:    reports that she has never smoked. She has never used smokeless tobacco. She reports that she does not drink alcohol and does not use drugs. Family History:   family history includes Cancer in her father and mother; Diabetes in her sister; Heart Disease in her sister; Other in her brother; Seizures in her son. PHYSICAL EXAM:  Vitals:  BP (!) 162/60   Pulse 82   Temp 98.6 °F (37 °C)   Resp 18   Ht 5' (1.524 m)   Wt 200 lb (90.7 kg)   SpO2 95%   BMI 39.06 kg/m²   General Appearance: alert and oriented to person, place and time and in no acute distress  Skin: warm and dry, turgor not diminished. No decubitus ulcer  Head: normocephalic and atraumatic  Eyes: pupils equal, round, and reactive to light, extraocular eye movements intact, conjunctivae normal  Neck: neck supple and non tender without mass   Pulmonary/Chest: clear to auscultation bilaterally- no wheezes, rales or rhonchi, normal air movement, no respiratory distress  Cardiovascular: normal rate, normal S1 and S2 and no M/R/R  Abdomen: soft, non-tender, non-distended, normal bowel sounds, no masses or organomegaly  Extremities: no cyanosis, no clubbing and no edema  Neurologic: no cranial nerve deficit and speech normal.  4 out of 5 in all extremities        LABS:  Recent Labs     10/23/21  1902      K 3.9      CO2 28   BUN 13   CREATININE 0.8   GLUCOSE 151*   CALCIUM 8.8       Recent Labs     10/23/21  1902   WBC 6.4   RBC 4.32   HGB 11.9   HCT 37.6   MCV 87.0   MCH 27.5   MCHC 31.6*   RDW 14.5      MPV 9.0       No results for input(s): POCGLU in the last 72 hours. Radiology:   CT Head WO Contrast   Final Result   No acute intracranial abnormality. XR CHEST PORTABLE   Final Result   No acute process. EKG: No acute normality    ASSESSMENT/PLAN:    Generalized weakness  -No clear etiology.   IV fluids, PT/OT  -Due to osteomyelitis earlier this year will check inflammatory markers, blood culture, procalcitonin. if elevated may need imaging of her back.  -Pulse ox 95% on room air. Check chest x-ray PA and LAT and viral panel    Diabetes mellitus type 2   -Resume basal/bolus with SSI    Essential hypertension  -Resume antihypertensives    Parkinson's disease  -Resume Sinemet    RLS  -Resume Requip    Code Status: full  DVT prophylaxis: lovenox      NOTE: This report was transcribed using voice recognition software. Every effort was made to ensure accuracy; however, inadvertent computerized transcription errors may be present.   Electronically signed by Ki Hayes MD on 10/23/2021 at 9:30 PM

## 2021-10-25 VITALS
HEART RATE: 70 BPM | HEIGHT: 60 IN | RESPIRATION RATE: 16 BRPM | BODY MASS INDEX: 39.27 KG/M2 | DIASTOLIC BLOOD PRESSURE: 67 MMHG | OXYGEN SATURATION: 95 % | SYSTOLIC BLOOD PRESSURE: 150 MMHG | TEMPERATURE: 98.3 F | WEIGHT: 200 LBS

## 2021-10-25 LAB
ANION GAP SERPL CALCULATED.3IONS-SCNC: 8 MMOL/L (ref 7–16)
BASOPHILS ABSOLUTE: 0.03 E9/L (ref 0–0.2)
BASOPHILS RELATIVE PERCENT: 0.5 % (ref 0–2)
BUN BLDV-MCNC: 17 MG/DL (ref 6–23)
C-REACTIVE PROTEIN: 0.7 MG/DL (ref 0–0.4)
CALCIUM SERPL-MCNC: 8.8 MG/DL (ref 8.6–10.2)
CHLORIDE BLD-SCNC: 103 MMOL/L (ref 98–107)
CHOLESTEROL, TOTAL: 148 MG/DL (ref 0–199)
CO2: 28 MMOL/L (ref 22–29)
CREAT SERPL-MCNC: 0.8 MG/DL (ref 0.5–1)
EOSINOPHILS ABSOLUTE: 0.09 E9/L (ref 0.05–0.5)
EOSINOPHILS RELATIVE PERCENT: 1.5 % (ref 0–6)
FOLATE: 18 NG/ML (ref 4.8–24.2)
GFR AFRICAN AMERICAN: >60
GFR NON-AFRICAN AMERICAN: >60 ML/MIN/1.73
GLUCOSE BLD-MCNC: 190 MG/DL (ref 74–99)
HBA1C MFR BLD: 6.4 % (ref 4–5.6)
HCT VFR BLD CALC: 38.6 % (ref 34–48)
HDLC SERPL-MCNC: 59 MG/DL
HEMOGLOBIN: 12 G/DL (ref 11.5–15.5)
IMMATURE GRANULOCYTES #: 0.04 E9/L
IMMATURE GRANULOCYTES %: 0.7 % (ref 0–5)
LDL CHOLESTEROL CALCULATED: 60 MG/DL (ref 0–99)
LYMPHOCYTES ABSOLUTE: 1.66 E9/L (ref 1.5–4)
LYMPHOCYTES RELATIVE PERCENT: 27.5 % (ref 20–42)
MAGNESIUM: 1.9 MG/DL (ref 1.6–2.6)
MCH RBC QN AUTO: 27.3 PG (ref 26–35)
MCHC RBC AUTO-ENTMCNC: 31.1 % (ref 32–34.5)
MCV RBC AUTO: 87.9 FL (ref 80–99.9)
METER GLUCOSE: 132 MG/DL (ref 74–99)
METER GLUCOSE: 169 MG/DL (ref 74–99)
METER GLUCOSE: 98 MG/DL (ref 74–99)
MONOCYTES ABSOLUTE: 0.34 E9/L (ref 0.1–0.95)
MONOCYTES RELATIVE PERCENT: 5.6 % (ref 2–12)
NEUTROPHILS ABSOLUTE: 3.87 E9/L (ref 1.8–7.3)
NEUTROPHILS RELATIVE PERCENT: 64.2 % (ref 43–80)
PARATHYROID HORMONE INTACT: 71 PG/ML (ref 15–65)
PDW BLD-RTO: 14.6 FL (ref 11.5–15)
PLATELET # BLD: 196 E9/L (ref 130–450)
PMV BLD AUTO: 9.2 FL (ref 7–12)
POTASSIUM REFLEX MAGNESIUM: 4.3 MMOL/L (ref 3.5–5)
PROCALCITONIN: 0.05 NG/ML (ref 0–0.08)
RBC # BLD: 4.39 E12/L (ref 3.5–5.5)
SODIUM BLD-SCNC: 139 MMOL/L (ref 132–146)
TRIGL SERPL-MCNC: 144 MG/DL (ref 0–149)
URINE CULTURE, ROUTINE: NORMAL
VITAMIN B-12: 367 PG/ML (ref 211–946)
VITAMIN D 25-HYDROXY: 17 NG/ML (ref 30–100)
VLDLC SERPL CALC-MCNC: 29 MG/DL
WBC # BLD: 6 E9/L (ref 4.5–11.5)

## 2021-10-25 PROCEDURE — 97161 PT EVAL LOW COMPLEX 20 MIN: CPT

## 2021-10-25 PROCEDURE — APPSS45 APP SPLIT SHARED TIME 31-45 MINUTES: Performed by: NURSE PRACTITIONER

## 2021-10-25 PROCEDURE — 83970 ASSAY OF PARATHORMONE: CPT

## 2021-10-25 PROCEDURE — G0378 HOSPITAL OBSERVATION PER HR: HCPCS

## 2021-10-25 PROCEDURE — 6370000000 HC RX 637 (ALT 250 FOR IP): Performed by: NURSE PRACTITIONER

## 2021-10-25 PROCEDURE — 87040 BLOOD CULTURE FOR BACTERIA: CPT

## 2021-10-25 PROCEDURE — 86140 C-REACTIVE PROTEIN: CPT

## 2021-10-25 PROCEDURE — 96372 THER/PROPH/DIAG INJ SC/IM: CPT

## 2021-10-25 PROCEDURE — 97165 OT EVAL LOW COMPLEX 30 MIN: CPT

## 2021-10-25 PROCEDURE — 2580000003 HC RX 258: Performed by: INTERNAL MEDICINE

## 2021-10-25 PROCEDURE — 84145 PROCALCITONIN (PCT): CPT

## 2021-10-25 PROCEDURE — 36415 COLL VENOUS BLD VENIPUNCTURE: CPT

## 2021-10-25 PROCEDURE — 6360000002 HC RX W HCPCS: Performed by: INTERNAL MEDICINE

## 2021-10-25 PROCEDURE — 85025 COMPLETE CBC W/AUTO DIFF WBC: CPT

## 2021-10-25 PROCEDURE — 80061 LIPID PANEL: CPT

## 2021-10-25 PROCEDURE — 6370000000 HC RX 637 (ALT 250 FOR IP): Performed by: INTERNAL MEDICINE

## 2021-10-25 PROCEDURE — 6370000000 HC RX 637 (ALT 250 FOR IP): Performed by: FAMILY MEDICINE

## 2021-10-25 PROCEDURE — 80048 BASIC METABOLIC PNL TOTAL CA: CPT

## 2021-10-25 PROCEDURE — 82962 GLUCOSE BLOOD TEST: CPT

## 2021-10-25 RX ORDER — INSULIN GLARGINE 100 [IU]/ML
10 INJECTION, SOLUTION SUBCUTANEOUS NIGHTLY
Qty: 10 ML | Refills: 3 | Status: ON HOLD | DISCHARGE
Start: 2021-10-25 | End: 2022-01-11 | Stop reason: SDUPTHER

## 2021-10-25 RX ORDER — MULTIVITAMIN WITH IRON
1 TABLET ORAL DAILY
Refills: 0 | Status: ON HOLD | DISCHARGE
Start: 2021-10-26 | End: 2022-04-19 | Stop reason: HOSPADM

## 2021-10-25 RX ORDER — AMLODIPINE BESYLATE 5 MG/1
5 TABLET ORAL DAILY
Qty: 30 TABLET | Refills: 3 | Status: ON HOLD | DISCHARGE
Start: 2021-10-25 | End: 2022-01-10 | Stop reason: ALTCHOICE

## 2021-10-25 RX ORDER — BUSPIRONE HYDROCHLORIDE 5 MG/1
5 TABLET ORAL 3 TIMES DAILY
Status: ON HOLD | DISCHARGE
Start: 2021-10-25 | End: 2022-01-10 | Stop reason: ALTCHOICE

## 2021-10-25 RX ORDER — INSULIN GLARGINE 100 [IU]/ML
40 INJECTION, SOLUTION SUBCUTANEOUS NIGHTLY
Qty: 10 ML | Refills: 3 | DISCHARGE
Start: 2021-10-25 | End: 2021-10-25

## 2021-10-25 RX ORDER — VITAMIN B COMPLEX
1000 TABLET ORAL DAILY
Status: DISCONTINUED | OUTPATIENT
Start: 2021-10-25 | End: 2021-10-25 | Stop reason: HOSPADM

## 2021-10-25 RX ORDER — AMLODIPINE BESYLATE 5 MG/1
5 TABLET ORAL DAILY
Status: DISCONTINUED | OUTPATIENT
Start: 2021-10-25 | End: 2021-10-25 | Stop reason: HOSPADM

## 2021-10-25 RX ORDER — CHOLECALCIFEROL (VITAMIN D3) 25 MCG
1000 TABLET ORAL DAILY
Qty: 60 TABLET | Status: ON HOLD | DISCHARGE
Start: 2021-10-26 | End: 2022-07-07

## 2021-10-25 RX ORDER — ROPINIROLE 0.5 MG/1
1 TABLET, FILM COATED ORAL 3 TIMES DAILY
Qty: 360 TABLET | Refills: 3 | Status: ON HOLD
Start: 2021-10-25 | End: 2022-01-10 | Stop reason: ALTCHOICE

## 2021-10-25 RX ADMIN — Medication 10 ML: at 08:35

## 2021-10-25 RX ADMIN — BUSPIRONE HYDROCHLORIDE 5 MG: 5 TABLET ORAL at 14:03

## 2021-10-25 RX ADMIN — SODIUM CHLORIDE: 9 INJECTION, SOLUTION INTRAVENOUS at 08:32

## 2021-10-25 RX ADMIN — ROPINIROLE HYDROCHLORIDE 0.5 MG: 0.5 TABLET, FILM COATED ORAL at 08:30

## 2021-10-25 RX ADMIN — BUSPIRONE HYDROCHLORIDE 5 MG: 5 TABLET ORAL at 08:31

## 2021-10-25 RX ADMIN — AMLODIPINE BESYLATE 5 MG: 5 TABLET ORAL at 16:36

## 2021-10-25 RX ADMIN — CARBIDOPA AND LEVODOPA 2 TABLET: 25; 100 TABLET, EXTENDED RELEASE ORAL at 08:30

## 2021-10-25 RX ADMIN — INSULIN LISPRO 8 UNITS: 100 INJECTION, SOLUTION INTRAVENOUS; SUBCUTANEOUS at 11:48

## 2021-10-25 RX ADMIN — INSULIN LISPRO 1 UNITS: 100 INJECTION, SOLUTION INTRAVENOUS; SUBCUTANEOUS at 08:33

## 2021-10-25 RX ADMIN — Medication 1000 UNITS: at 14:03

## 2021-10-25 RX ADMIN — INSULIN LISPRO 8 UNITS: 100 INJECTION, SOLUTION INTRAVENOUS; SUBCUTANEOUS at 08:31

## 2021-10-25 RX ADMIN — PANTOPRAZOLE SODIUM 40 MG: 40 TABLET, DELAYED RELEASE ORAL at 06:06

## 2021-10-25 RX ADMIN — INSULIN LISPRO 8 UNITS: 100 INJECTION, SOLUTION INTRAVENOUS; SUBCUTANEOUS at 16:33

## 2021-10-25 RX ADMIN — LOSARTAN POTASSIUM 50 MG: 50 TABLET, FILM COATED ORAL at 08:30

## 2021-10-25 RX ADMIN — ENOXAPARIN SODIUM 40 MG: 40 INJECTION SUBCUTANEOUS at 08:28

## 2021-10-25 RX ADMIN — ROPINIROLE HYDROCHLORIDE 0.5 MG: 0.5 TABLET, FILM COATED ORAL at 14:03

## 2021-10-25 RX ADMIN — CARBIDOPA AND LEVODOPA 2 TABLET: 25; 100 TABLET, EXTENDED RELEASE ORAL at 14:03

## 2021-10-25 RX ADMIN — LAMOTRIGINE 200 MG: 100 TABLET ORAL at 08:29

## 2021-10-25 RX ADMIN — VENLAFAXINE HYDROCHLORIDE 150 MG: 150 CAPSULE, EXTENDED RELEASE ORAL at 08:29

## 2021-10-25 RX ADMIN — MULTIVITAMIN TABLET 1 TABLET: TABLET at 08:30

## 2021-10-25 ASSESSMENT — PAIN SCALES - GENERAL: PAINLEVEL_OUTOF10: 0

## 2021-10-25 NOTE — TELEPHONE ENCOUNTER
Patient called in asking about her ropinerole. Per the patient, it went to the wrong pharmacy. Called ropinerole in to correct pharmacy[facility] [place], 0.5 mg, 2 tablets TID dispense 540 tablets 3 refills for 90 day supply.

## 2021-10-25 NOTE — PROGRESS NOTES
Physical Therapy    Facility/Department: [address] MED SURG/TELE  Initial Assessment    NAME: Francis Dubon  : 1950  MRN: [id number]    Date of Service: 10/25/2021      Attending Provider:  Armando Gaines MD    Evaluating PT:  Lourdes Calles. Cande Perdue P.T. Room #:  5567/9308-Z  Diagnosis:  Generalized weakness [R53.1]  Pertinent PMHx/PSHx:  Parkinson's Disease  Precautions:  Falls    SUBJECTIVE:    Pt lives at [facility] and ambulated with a rollator ww PTA. OBJECTIVE:   Initial Evaluation  Date: 10/25/21 Treatment Short Term/ Long Term   Goals   Was pt agreeable to Eval/treatment? yes     Does pt have pain? No c/o pain     Bed Mobility  Rolling: Independent  Supine to sit: Independent  Sit to supine: NA  Scooting: Independent  Independent   Transfers Sit to stand: supervision  Stand to sit: supervision  Stand pivot: SBA with rollator ww  Independent    Ambulation   40 feet with ww SBA  150 feet with ww Independent   Stair negotiation: ascended and descended NA  NA   AM-PAC 6 Clicks 68/87       BLE ROM is WFL. BLE strength is grossly 4/5 to 4+/5. Sensation:  Pt denies numbness and tingling to extremities  Edema:  None noted  Balance: sitting is Independent and standing with ww is supervision  Endurance: fair    ASSESSMENT:    Conditions Requiring Skilled Therapeutic Intervention:    [x]Decreased strength     []Decreased ROM  [x]Decreased functional mobility  [x]Decreased balance   [x]Decreased endurance   []Decreased posture  []Decreased sensation  []Decreased coordination   []Decreased vision  []Decreased safety awareness   []Increased pain     Comments:  Pt reports she is feeling better this am and was able to get OOB on her own and walk with SBA for safety as gait was slower than normal and occasionally she had mild unsteadiness, but no significant LOB. Pt was left sitting up in chair on waffle cushion with call light left by patient. Pt's/ family goals   1. To go back to [facility].      Patient and or family understand(s) diagnosis, prognosis, and plan of care. PHYSICAL THERAPY PLAN OF CARE:    PT POC is established based on physician order and patient diagnosis     Referring provider/PT Order:  PT eval and treat  Diagnosis:  Generalized weakness [R53.1]  Specific instructions for next treatment:  Increase amb distance. Current Treatment Recommendations:     [x] Strengthening to improve independence with functional mobility   [] ROM to improve ROM and decrease spasm and pain which will help promote independence with functional mobility   [x] Balance Training to improve static/dynamic balance and to reduce fall risk  [x] Endurance Training to improve activity tolerance during functional mobility   [x] Transfer Training to improve safety and independence with all functional transfers   [x] Gait Training to improve gait mechanics, endurance and assess need for appropriate assistive device  [] Stair Training in preparation for safe discharge home and/or into the community   [] Positioning to prevent skin breakdown and contractures  [] Safety and Education Training   [x] Patient/Caregiver Education   [] HEP  [] Other     PT long term treatment goals are located in above grid    Frequency of treatments: 2-5x/week x 1-2 weeks. Time in  06:45  Time out  07:05    Evaluation Time includes thorough review of current medical information, gathering information on past medical history/social history and prior level of function, completion of standardized testing/informal observation of tasks, assessment of data and education on plan of care and goals.     CPT codes:  [x] Low Complexity PT evaluation 37873  [] Moderate Complexity PT evaluation 49919  [] High Complexity PT evaluation 87963  [] PT Re-evaluation 12767  [] Gait training 05103 ** minutes  [] Manual therapy 26151 ** minutes  [] Therapeutic activities 37372 ** minutes  [] Therapeutic exercises 84849 ** minutes  [] Neuromuscular reeducation [phone] ** minutes     Timothy B. Eduard Severe., P.T.   License Number: [id number]

## 2021-10-25 NOTE — DISCHARGE SUMMARY
[facility] Hospitalist       Hospitalist Physician Discharge Summary       MD Maryana Suárez [id number]  [phone]    Schedule an appointment as soon as possible for a visit in 1 week  Please call for follow up post hospital stay appt. Activity level: A tolerated    Diet: ADULT DIET; Regular; 4 carb choices (60 gm/meal)      Condition at discharge: Stable    Dispo:Home      Patient ID:  Anastacio Ley  [id number]  54 y.o.  1950    Admit date: 10/23/2021    Discharge date and time:  10/25/2021  2:35 PM    Admission Diagnoses: Active Problems:    Diabetes mellitus ([facility] [address]    Parkinson's disease ([facility] [address]    Primary hypertension    Generalized weakness    Inability to walk    Hypothyroidism  Resolved Problems:    * No resolved hospital problems. *      Discharge Diagnoses: Active Problems:    Diabetes mellitus ([address]    Parkinson's disease ([facility] [address]    Primary hypertension    Generalized weakness    Inability to walk    Hypothyroidism  Resolved Problems:    * No resolved hospital problems. *      Consults:  IP CONSULT TO INTERNAL MEDICINE  IP CONSULT TO IV TEAM    Procedures: None      Hospital Course:     823/2021 69year old female PMH anxiety/Depression. Asthma, CAD, Breast CA(2006), CKD, DM, HLD, HTN, Hypothyroidism, SERENA, and MRSA infection presented to [facility] ED with complaints of Fatigue. Patient stated that she is very shaky on her feet and feels like she is going to fall. Patient states that she is concerned to walk. Patient labs drawn. Patient's labs are initially benign. Patient had a urine analysis. Patient's urinalysis is normal.  Patient had an elevated troponin. Patient had delta troponin drawn. Patient's initial EKG is benign. Patient stated that she does not feel safe at home but will go to a rehab. 1. Generalized Weakness-  Unclear Etiology? Noted with MSSA bacteremia, iliopsoas abscess status post drain, osteomyelitis of the sacroiliac joint. Received Nafcillin. Repeat MRI showed resolution of the abscess but still had persistent inflammatory changes. Inflammatory markers/Procalcitonin pending. CT Head no acute process noted. CXR no acute process. Respiratory panel pending UA negative Urine culture pending. Blood cultures pending. ?repeat MRI Back. PT/OT. IVF hydration. follow  2. DM- A1C in am. Continue lantus/SSI/Hypoglycemic protocol/Carb controlled diet. 3. HTN- Continue Losartan. Follow adjust as needed. 4. Parkison's Disease- Continue Cabidopa-Levodopa   5. RLS- Continue Ropinirole. 6. Depression- Continue Doxepin/Venlafaxine  7. HLD- Continue ASA/Atorvastin. [facility] Nuclear Stress Study 04/25/2014 that revealed Reversible inferolateral wall perfusion defect. Finding suggests left ventricular myocardium at risk for stress-induced ischemia. EF 70%. 4/28/2014 cardiac cath minimal luminal irregularities involving LAD, without significant angiographic luminal narrowing, widely patent rest of the artery. Per Dr. Kalli Pabon  8. Hx CAD- Continue ASA/Statin. Denies CP. 9. HX SERENA- CPAP    Pt feeling much improved this morning. States she was able to ambulate to the restroom with walker and did well. amPAC score 20/24. Pt noted with mild Vitamin D deficiency. Willsupplement. Amlodipine added for better BP control. Pt is hemodynamically stable and can be discharged at this time. Pt will be instructed to follow up with PCP upon DC.     Discharge Exam:  Vitals:    10/24/21 1730 10/24/21 2057 10/24/21 2230 10/25/21 0800   BP: (!) 175/79 (!) 199/79 (!) 185/83 (!) 150/67   Pulse:  77  70   Resp:  18  16   Temp:  98.4 °F (36.9 °C)  98.3 °F (36.8 °C)   TempSrc:  Oral  Oral   SpO2:       Weight:       Height:           General Appearance: alert and oriented to person, place and time and in no acute distress  Skin: warm and dry  Head: normocephalic and atraumatic  Eyes: pupils equal, round, and reactive to light, extraocular eye movements intact, conjunctivae normal  Neck: neck supple and non tender without mass   Pulmonary/Chest: clear/Diminished to auscultation bilaterally- no wheezes, rales or rhonchi, normal air movement, no respiratory distress  Cardiovascular: normal rate, normal S1 and S2 and no RGM  Abdomen: soft, non-tender, non-distended, normal bowel sounds  Extremities: no cyanosis, no clubbing and no edema  Neurologic: no cranial nerve deficit and speech normal   }  I/O last 3 completed shifts:  In: -   Out: 2250 [Urine:2250]  No intake/output data recorded. LABS:  Recent Labs     10/23/21  1902 10/25/21  0425    139   K 3.9 4.3    103   CO2 28 28   BUN 13 17   CREATININE 0.8 0.8   GLUCOSE 151* 190*   CALCIUM 8.8 8.8       Recent Labs     10/23/21  1902 10/25/21  0425   WBC 6.4 6.0   RBC 4.32 4.39   HGB 11.9 12.0   HCT 37.6 38.6   MCV 87.0 87.9   MCH 27.5 27.3   MCHC 31.6* 31.1*   RDW 14.5 14.6    196   MPV 9.0 9.2       No results for input(s): POCGLU in the last 72 hours. Imaging:  CT Head WO Contrast    Result Date: 10/23/2021  EXAMINATION: CT OF THE HEAD WITHOUT CONTRAST  10/23/2021 7:49 pm TECHNIQUE: CT of the head was performed without the administration of intravenous contrast. Dose modulation, iterative reconstruction, and/or weight based adjustment of the mA/kV was utilized to reduce the radiation dose to as low as reasonably achievable. COMPARISON: None. HISTORY: ORDERING SYSTEM PROVIDED HISTORY: ams TECHNOLOGIST PROVIDED HISTORY: Reason for exam:->ams Has a \"code stroke\" or \"stroke alert\" been called? ->No Decision Support Exception - unselect if not a suspected or confirmed emergency medical condition->Emergency Medical Condition (MA) FINDINGS: BRAIN/VENTRICLES: There is no acute intracranial hemorrhage, mass effect or midline shift. No abnormal extra-axial fluid collection. The gray-white differentiation is maintained without evidence of an acute infarct. There is no evidence of hydrocephalus.  ORBITS: The (COZAAR) 50 MG tablet Take 50 mg by mouth daily      omeprazole (PRILOSEC) 40 MG delayed release capsule Take 40 mg by mouth daily       insulin glargine (LANTUS SOLOSTAR) 100 UNIT/ML injection pen Inject 80 Units into the skin nightly  Qty: 5 pen, Refills: 3      blood glucose test strips (ACCU-CHEK RIGOBERTO PLUS) strip 1 each by In Vitro route 2 times daily Indications: DISP ACCU-CHEK As needed. lamoTRIgine (LAMICTAL) 200 MG tablet Take 200 mg by mouth daily   Refills: 0      atorvastatin (LIPITOR) 20 MG tablet Take 1 tablet by mouth daily  Qty: 90 tablet, Refills: 3    Comments: **Patient requests 90 days supply**      insulin aspart (NOVOLOG FLEXPEN) 100 UNIT/ML injection pen INJECT 0-10 UNITS INTO THE SKIN THREE TIMES DAILY(BEFORE MEALS) SLIDING SCALE (MAX DAILY 30 UNITS)  Qty: 5 pen, Refills: 3      aspirin 81 MG tablet Take 81 mg by mouth nightly         STOP taking these medications       cephALEXin (KEFLEX) 500 MG capsule Comments:   Reason for Stopping:         ALPRAZolam (XANAX) 0.5 MG tablet Comments:   Reason for Stopping:                 Note that more than 30 minutes was spent in preparing discharge papers, discussing discharge with patient, medication review, etc.    NOTE: This report was transcribed using voice recognition software. Every effort was made to ensure accuracy; however, inadvertent computerized transcription errors may be present. Signed:  Electronically signed by Michelle Noyola CNP on 10/25/2021 at 2:35 PM

## 2021-10-25 NOTE — TELEPHONE ENCOUNTER
Yes, if she wants to try taking 1 mg (2 tabs) of Requip in the morning and a lunch that is fine. If she is having uncontrollable dance-like movements with this increase she needs to let me know.  Call in a few weeks with an update

## 2021-10-25 NOTE — CARE COORDINATION
Social Work / Discharge Planning :Patient admitted with weakness. Therapy am/pac 20/24 supports home. Patient resides with her son and his family. Patient  uses a quad cane Patient PCP Dr. Diego Valentine. Pharmacy is [email or web address] in [place]. . Patent denies hx of [facility] and was in [facility] for IV antibiotics. . Patient plan at discharge is home. If patient need transport, she has the number to [employer] dial a ride. AWait plan. SW to follow.  Electronically signed by ESTHER Engle on 10/25/21 at 10:36 AM EDT

## 2021-10-25 NOTE — PROGRESS NOTES
dynamic balance, stand tolerance for increased safety and independence with ADLs  * Neuro-muscular re-education: facilitation of righting/equilibrium reactions, midline orientation, scapular stability/mobility, normalization of muscle tone, and facilitation of volitional active controled movement    Recommended Adaptive Equipment: TBD    Home Living: Patient lives alone in a one-floor apartment (elevator access); patient noted that her apartment is equipped with emergency pull cords. Bathroom Setup: walk-in shower (with seat and grab bars)  Equipment Owned: rollator    Prior Level of Function (PLOF): Patient reported that she was independent with ADLs and home-based IADLs. Patient was independent functional mobility (with rollator, as needed) prior to this hospitalization. Driving: No - patient utilizes transportation services    Pain Level: Patient denied experiencing pain. Cognition: Patient alert and oriented x3. WFL command follow demonstrated. Patient pleasant, cooperative, and motivated to return to [facility] and home environment. Memory: WFL  Sequencing: WFL  Problem Solving: WFL  Judgement/Safety: WFL grossly    Functional Assessment:  AM-PAC Daily Activity Raw Score: 19/24   Initial Eval Status  Date: 10/25/2021 Treatment Status  Date:  Short Term Goals = Long Term Goals   Feeding Independent  N/A   Grooming SBA  Mod I  (seated/standing at sink)   UB Dressing SBA  Mod I  (including item retrieval)   LB Dressing SBA to adjust socks while seated at EOB. Mod I / Independent - with use of AE, as needed/appropriate   Bathing SBA  Mod I / Independent - with use of AE/DME, as needed/appropriate   Toileting SBA  Independent / Mod I   Bed Mobility  Supine-to-Sit: Supervision  Sit-to-Supine: Supervision      Functional Transfers Sit-to-Stand: SBA   from EOB  Independent   Functional Mobility SBA   (with walker) for household distance within patient's room and bathroom.   Mod I with functional mobility (with device, as needed/appropriate) in order to maximize independence with ADLs/IADLs and other functional tasks. Balance Sitting: Good  (at EOB)  Standing: Fair  (with walker)  Fair+ dynamic standing balance during completion of ADLs/IADLs and other functional tasks. Activity Tolerance Fair  Mild shortness of breath noted with household distance functional mobility. Patient will demonstrate Good understanding and consistent implementation of energy conservation techniques and work simplification techniques into ADL/IADL routines. Visual/  Perceptual WFL  Patient wears glasses. N/A   B UE Strength 4-/5  Patient will demonstrate 4+/5 B UE strength in order to maximize independence with ADLs/IADLs and functional transfers. Additional Long-Term Goal: Patient will increase functional independence to PLOF in order to allow patient to live in least restrictive environment. ROM: Additional Information:    R UE  WFL    L UE WFL      Hearing: WFL  Sensation: Patient denied experiencing numbness/tingling in B UEs. Tone: WFL  Edema: No    Comments: RN approved patient's participation in [address] [place] [address] activities. Upon arrival, patient supine in bed. At end of session, patient supine in bed (per patient preference) with call light and phone within reach and all lines and tubes intact. Patient would benefit from continued skilled OT to increase safety and independence with completion of ADL/IADL tasks for functional independence and quality of life. Rehab Potential: Good for established goals. Patient / Family Goal: Patient anticipates returning home upon discharge. Patient and/or family were instructed on functional diagnosis, prognosis/goals, and OT plan of care. Demonstrated Good understanding.     Eval Complexity: Low    Time In: 1500  Time Out: 1515  Total Treatment Time: 0 minutes      Minutes Units   OT Eval Low 67741 15 1   OT Eval Medium 49442     OT Eval High 02979     OT Re-Eval [id number]     Therapeutic Ex 14816 Therapeutic Activities 88315     ADL/Self Care 20325     Orthotic Management 57713     Neuro Re-Ed 31282     Non-Billable Time N/A ---     Evaluation time includes thorough review of current medical information, gathering information on past medical history/social history and prior level of function, completion of standardized testing/informal observation of tasks, assessment of data, and education on plan of care and goals. Pearl Kat, OTR/L  License Number: [id number]

## 2021-10-30 LAB
BLOOD CULTURE, ROUTINE: NORMAL
CULTURE, BLOOD 2: NORMAL

## 2021-11-13 ENCOUNTER — APPOINTMENT (OUTPATIENT)
Dept: CT IMAGING | Age: 71
End: 2021-11-13
Payer: MEDICARE

## 2021-11-13 ENCOUNTER — HOSPITAL ENCOUNTER (EMERGENCY)
Age: 71
Discharge: HOME | End: 2021-11-13
Attending: EMERGENCY MEDICINE
Payer: MEDICARE

## 2021-11-13 VITALS
SYSTOLIC BLOOD PRESSURE: 188 MMHG | RESPIRATION RATE: 15 BRPM | HEART RATE: 84 BPM | BODY MASS INDEX: 34.15 KG/M2 | OXYGEN SATURATION: 95 % | DIASTOLIC BLOOD PRESSURE: 87 MMHG | HEIGHT: 64 IN | TEMPERATURE: 98 F | WEIGHT: 200 LBS

## 2021-11-13 DIAGNOSIS — E86.0 DEHYDRATION: ICD-10-CM

## 2021-11-13 DIAGNOSIS — R42 DIZZINESS: Primary | ICD-10-CM

## 2021-11-13 LAB
ALBUMIN SERPL-MCNC: 4.1 G/DL (ref 3.5–5.2)
ALP BLD-CCNC: 95 U/L (ref 35–104)
ALT SERPL-CCNC: <5 U/L (ref 0–32)
ANION GAP SERPL CALCULATED.3IONS-SCNC: 11 MMOL/L (ref 7–16)
AST SERPL-CCNC: 12 U/L (ref 0–31)
BACTERIA: ABNORMAL /HPF
BASOPHILS ABSOLUTE: 0.05 E9/L (ref 0–0.2)
BASOPHILS RELATIVE PERCENT: 0.8 % (ref 0–2)
BILIRUB SERPL-MCNC: 0.3 MG/DL (ref 0–1.2)
BILIRUBIN URINE: NEGATIVE
BLOOD, URINE: NEGATIVE
BUN BLDV-MCNC: 14 MG/DL (ref 6–23)
CALCIUM SERPL-MCNC: 9 MG/DL (ref 8.6–10.2)
CHLORIDE BLD-SCNC: 101 MMOL/L (ref 98–107)
CLARITY: CLEAR
CO2: 28 MMOL/L (ref 22–29)
COLOR: YELLOW
CREAT SERPL-MCNC: 0.8 MG/DL (ref 0.5–1)
EOSINOPHILS ABSOLUTE: 0.07 E9/L (ref 0.05–0.5)
EOSINOPHILS RELATIVE PERCENT: 1.1 % (ref 0–6)
EPITHELIAL CELLS, UA: ABNORMAL /HPF
GFR AFRICAN AMERICAN: >60
GFR NON-AFRICAN AMERICAN: >60 ML/MIN/1.73
GLUCOSE BLD-MCNC: 115 MG/DL (ref 74–99)
GLUCOSE URINE: NEGATIVE MG/DL
HCT VFR BLD CALC: 39.1 % (ref 34–48)
HEMOGLOBIN: 12.2 G/DL (ref 11.5–15.5)
IMMATURE GRANULOCYTES #: 0.04 E9/L
IMMATURE GRANULOCYTES %: 0.6 % (ref 0–5)
KETONES, URINE: NEGATIVE MG/DL
LACTIC ACID: 1.2 MMOL/L (ref 0.5–2.2)
LEUKOCYTE ESTERASE, URINE: ABNORMAL
LIPASE: 17 U/L (ref 13–60)
LYMPHOCYTES ABSOLUTE: 1.42 E9/L (ref 1.5–4)
LYMPHOCYTES RELATIVE PERCENT: 22.5 % (ref 20–42)
MCH RBC QN AUTO: 27.2 PG (ref 26–35)
MCHC RBC AUTO-ENTMCNC: 31.2 % (ref 32–34.5)
MCV RBC AUTO: 87.3 FL (ref 80–99.9)
MONOCYTES ABSOLUTE: 0.31 E9/L (ref 0.1–0.95)
MONOCYTES RELATIVE PERCENT: 4.9 % (ref 2–12)
NEUTROPHILS ABSOLUTE: 4.43 E9/L (ref 1.8–7.3)
NEUTROPHILS RELATIVE PERCENT: 70.1 % (ref 43–80)
NITRITE, URINE: NEGATIVE
PDW BLD-RTO: 14.3 FL (ref 11.5–15)
PH UA: 6.5 (ref 5–9)
PLATELET # BLD: 184 E9/L (ref 130–450)
PMV BLD AUTO: 9 FL (ref 7–12)
POTASSIUM REFLEX MAGNESIUM: 3.8 MMOL/L (ref 3.5–5)
PROTEIN UA: ABNORMAL MG/DL
RBC # BLD: 4.48 E12/L (ref 3.5–5.5)
RBC UA: ABNORMAL /HPF (ref 0–2)
SODIUM BLD-SCNC: 140 MMOL/L (ref 132–146)
SPECIFIC GRAVITY UA: 1.01 (ref 1–1.03)
TOTAL PROTEIN: 6.6 G/DL (ref 6.4–8.3)
TROPONIN, HIGH SENSITIVITY: 15 NG/L (ref 0–9)
UROBILINOGEN, URINE: 0.2 E.U./DL
WBC # BLD: 6.3 E9/L (ref 4.5–11.5)
WBC UA: ABNORMAL /HPF (ref 0–5)

## 2021-11-13 PROCEDURE — 83690 ASSAY OF LIPASE: CPT

## 2021-11-13 PROCEDURE — 81001 URINALYSIS AUTO W/SCOPE: CPT

## 2021-11-13 PROCEDURE — 80053 COMPREHEN METABOLIC PANEL: CPT

## 2021-11-13 PROCEDURE — 85025 COMPLETE CBC W/AUTO DIFF WBC: CPT

## 2021-11-13 PROCEDURE — 96374 THER/PROPH/DIAG INJ IV PUSH: CPT

## 2021-11-13 PROCEDURE — 84484 ASSAY OF TROPONIN QUANT: CPT

## 2021-11-13 PROCEDURE — 96361 HYDRATE IV INFUSION ADD-ON: CPT

## 2021-11-13 PROCEDURE — 70450 CT HEAD/BRAIN W/O DYE: CPT

## 2021-11-13 PROCEDURE — 99285 EMERGENCY DEPT VISIT HI MDM: CPT

## 2021-11-13 PROCEDURE — 93005 ELECTROCARDIOGRAM TRACING: CPT | Performed by: EMERGENCY MEDICINE

## 2021-11-13 PROCEDURE — 6360000002 HC RX W HCPCS: Performed by: EMERGENCY MEDICINE

## 2021-11-13 PROCEDURE — 2580000003 HC RX 258: Performed by: EMERGENCY MEDICINE

## 2021-11-13 PROCEDURE — 83605 ASSAY OF LACTIC ACID: CPT

## 2021-11-13 PROCEDURE — 6370000000 HC RX 637 (ALT 250 FOR IP): Performed by: EMERGENCY MEDICINE

## 2021-11-13 RX ORDER — MECLIZINE HYDROCHLORIDE 25 MG/1
25 TABLET ORAL 3 TIMES DAILY PRN
Qty: 15 TABLET | Refills: 0 | Status: SHIPPED | OUTPATIENT
Start: 2021-11-13 | End: 2021-11-23

## 2021-11-13 RX ORDER — ONDANSETRON 2 MG/ML
4 INJECTION INTRAMUSCULAR; INTRAVENOUS ONCE
Status: COMPLETED | OUTPATIENT
Start: 2021-11-13 | End: 2021-11-13

## 2021-11-13 RX ORDER — MECLIZINE HCL 12.5 MG/1
25 TABLET ORAL ONCE
Status: COMPLETED | OUTPATIENT
Start: 2021-11-13 | End: 2021-11-13

## 2021-11-13 RX ORDER — 0.9 % SODIUM CHLORIDE 0.9 %
500 INTRAVENOUS SOLUTION INTRAVENOUS ONCE
Status: COMPLETED | OUTPATIENT
Start: 2021-11-13 | End: 2021-11-13

## 2021-11-13 RX ADMIN — SODIUM CHLORIDE 500 ML: 9 INJECTION, SOLUTION INTRAVENOUS at 16:30

## 2021-11-13 RX ADMIN — MECLIZINE 25 MG: 12.5 TABLET ORAL at 16:29

## 2021-11-13 RX ADMIN — ONDANSETRON 4 MG: 2 INJECTION INTRAMUSCULAR; INTRAVENOUS at 16:29

## 2021-11-13 ASSESSMENT — ENCOUNTER SYMPTOMS
COUGH: 0
BACK PAIN: 0
NAUSEA: 1

## 2021-11-13 NOTE — ED PROVIDER NOTES
This is a 69-year-old female with a past medical history of Parkinson's disease diabetes and hypertension who presents to the ED for evaluation of dizziness. Patient states that for about the past 1 week she has been feeling lightheaded never she gets up to stand. Patient also states she feels nauseous notes this is improved and she lays flat or still. Patient remarks that she does have a slight cough and notes this is chronic. She has no chest pain or abdominal pain. She has no vision changes or speech difficulty. Patient has no dysuria or hematuria. Patient remarks that she has no fevers or chills. She has no other mitigaintg or exacerbating factors. The history is provided by the patient. Review of Systems   Constitutional: Negative for fever. HENT: Negative for congestion. Eyes: Negative for visual disturbance. Respiratory: Negative for cough. Cardiovascular: Negative for chest pain. Gastrointestinal: Positive for nausea. Endocrine: Negative for polyuria. Genitourinary: Negative for dysuria. Musculoskeletal: Negative for back pain. Skin: Negative for rash. Allergic/Immunologic: Negative for immunocompromised state. Neurological: Positive for dizziness. Hematological: Does not bruise/bleed easily. Psychiatric/Behavioral: Negative for confusion. Physical Exam  Vitals and nursing note reviewed. Constitutional:       General: She is not in acute distress. Appearance: She is well-developed. HENT:      Head: Normocephalic and atraumatic. Mouth/Throat:      Mouth: Mucous membranes are dry. Eyes:      Extraocular Movements: Extraocular movements intact. Pupils: Pupils are equal, round, and reactive to light. Neck:      Vascular: No JVD. Cardiovascular:      Rate and Rhythm: Normal rate and regular rhythm. Heart sounds: No murmur heard. Pulmonary:      Effort: Pulmonary effort is normal.      Breath sounds:  No wheezing, rhonchi or rales.   Chest:      Chest wall: No tenderness. Abdominal:      General: There is no distension. Palpations: Abdomen is soft. Tenderness: There is no abdominal tenderness. There is no rebound. Hernia: No hernia is present. Musculoskeletal:      Cervical back: Normal range of motion and neck supple. Right lower leg: No edema. Left lower leg: No edema. Skin:     General: Skin is warm and dry. Capillary Refill: Capillary refill takes less than 2 seconds. Neurological:      General: No focal deficit present. Mental Status: She is alert and oriented to person, place, and time. Cranial Nerves: No cranial nerve deficit. Psychiatric:         Mood and Affect: Mood normal.         Behavior: Behavior normal.          Procedures     MDM  Number of Diagnoses or Management Options  Dehydration  Dizziness  Diagnosis management comments: This is a 28-year-old female with a past medical history of Parkinson's disease as well as diabetes who presents to the ED for over 1 week of slight nausea as well as lightheadedness whenever she would get up. Patient had no no vision changes speech difficulty or unilateral weakness. Patient was nontoxic was treated with meclizine as well as antiemetics and IV fluids with significant provement in her symptoms.  EKG showed no ischemic changes CT showed no acute abnormalities given the patient's unremarkable neuro exam improvement after IV fluids was appropriate for outpatient follow-up was given strict return precautions.                       --------------------------------------------- PAST HISTORY ---------------------------------------------  Past Medical History:  has a past medical history of Anxiety, Asthma, CAD (coronary artery disease), Cancer ([facility] [address] Chronic kidney disease, Depression, Diabetes mellitus ([facility] [address] H/O mammogram, Hx MRSA infection, Hyperlipidemia, Hypertension, Hypothyroidism, Lateral epicondylitis, SERENA on CPAP, and Tubal - 12.0 fL    Neutrophils % 70.1 43.0 - 80.0 %    Immature Granulocytes % 0.6 0.0 - 5.0 %    Lymphocytes % 22.5 20.0 - 42.0 %    Monocytes % 4.9 2.0 - 12.0 %    Eosinophils % 1.1 0.0 - 6.0 %    Basophils % 0.8 0.0 - 2.0 %    Neutrophils Absolute 4.43 1.80 - 7.30 E9/L    Immature Granulocytes # 0.04 E9/L    Lymphocytes Absolute 1.42 (L) 1.50 - 4.00 E9/L    Monocytes Absolute 0.31 0.10 - 0.95 E9/L    Eosinophils Absolute 0.07 0.05 - 0.50 E9/L    Basophils Absolute 0.05 0.00 - 0.20 E9/L   Troponin   Result Value Ref Range    Troponin, High Sensitivity 15 (H) 0 - 9 ng/L   Lactic Acid, Plasma   Result Value Ref Range    Lactic Acid 1.2 0.5 - 2.2 mmol/L   Lipase   Result Value Ref Range    Lipase 17 13 - 60 U/L   Urinalysis with Microscopic   Result Value Ref Range    Color, UA Yellow Straw/Yellow    Clarity, UA Clear Clear    Glucose, Ur Negative Negative mg/dL    Bilirubin Urine Negative Negative    Ketones, Urine Negative Negative mg/dL    Specific Gravity, UA 1.015 1.005 - 1.030    Blood, Urine Negative Negative    pH, UA 6.5 5.0 - 9.0    Protein, UA TRACE Negative mg/dL    Urobilinogen, Urine 0.2 <2.0 E.U./dL    Nitrite, Urine Negative Negative    Leukocyte Esterase, Urine TRACE (A) Negative    WBC, UA 0-1 0 - 5 /HPF    RBC, UA 0-1 0 - 2 /HPF    Epithelial Cells, UA FEW /HPF    Bacteria, UA RARE (A) None Seen /HPF   EKG 12 Lead   Result Value Ref Range    Ventricular Rate 83 BPM    Atrial Rate 83 BPM    P-R Interval 176 ms    QRS Duration 82 ms    Q-T Interval 402 ms    QTc Calculation (Bazett) 472 ms    P Axis 49 degrees    R Axis 61 degrees    T Axis 51 degrees     EKG: This EKG is signed and interpreted by me. Rate: 83  Rhythm: Sinus  Interpretation: low voltage QRS, no st elevation , no st depression no t wave inversions  Comparison: was normal    Radiology:  CT HEAD WO CONTRAST   Final Result   No acute intracranial abnormality.       Mild senescent changes with parenchymal volume loss and chronic small vessel ischemic changes. ------------------------- NURSING NOTES AND VITALS REVIEWED ---------------------------  Date / Time Roomed:  11/13/2021  3:23 PM  ED Bed Assignment:  [id number]    The nursing notes within the ED encounter and vital signs as below have been reviewed. BP (!) 188/87   Pulse 84   Temp 98 °F (36.7 °C)   Resp 15   Ht 5' 4\" (1.626 m)   Wt 200 lb (90.7 kg)   SpO2 95%   BMI 34.33 kg/m²   Oxygen Saturation Interpretation: Normal      ------------------------------------------ PROGRESS NOTES ------------------------------------------  11:37 PM EST  I have spoken with the patient and discussed todays results, in addition to providing specific details for the plan of care and counseling regarding the diagnosis and prognosis. Their questions are answered at this time and they are agreeable with the plan. I discussed at length with them reasons for immediate return here for re evaluation. They will followup with their PCP.      --------------------------------- ADDITIONAL PROVIDER NOTES ---------------------------------  At this time the patient is without objective evidence of an acute process requiring hospitalization or inpatient management. They have remained hemodynamically stable throughout their entire ED visit and are stable for discharge with outpatient follow-up. The plan has been discussed in detail and they are aware of the specific conditions for emergent return, as well as the importance of follow-up. Discharge Medication List as of 11/13/2021  8:06 PM      START taking these medications    Details   meclizine (ANTIVERT) 25 MG tablet Take 1 tablet by mouth 3 times daily as needed for Dizziness, Disp-15 tablet, R-0Print             Diagnosis:  1. Dizziness    2. Dehydration        Disposition:  Patient's disposition: Discharge to home  Patient's condition is stable.      Stevo Scott DO  11/14/21 1528

## 2021-11-13 NOTE — ED NOTES
Bed:  [place] [address]  Expected date:   Expected time:   Means of arrival:   Comments:  EMS     Tootie Burton RN  11/13/21 [id number]

## 2021-11-14 LAB
EKG ATRIAL RATE: 83 BPM
EKG P AXIS: 49 DEGREES
EKG P-R INTERVAL: 176 MS
EKG Q-T INTERVAL: 402 MS
EKG QRS DURATION: 82 MS
EKG QTC CALCULATION (BAZETT): 472 MS
EKG R AXIS: 61 DEGREES
EKG T AXIS: 51 DEGREES
EKG VENTRICULAR RATE: 83 BPM

## 2021-11-14 PROCEDURE — 93010 ELECTROCARDIOGRAM REPORT: CPT | Performed by: INTERNAL MEDICINE

## 2021-12-27 DIAGNOSIS — G20 PARKINSON DISEASE (HCC): ICD-10-CM

## 2021-12-27 RX ORDER — CARBIDOPA AND LEVODOPA 50; 200 MG/1; MG/1
1 TABLET, EXTENDED RELEASE ORAL 3 TIMES DAILY
Qty: 90 TABLET | Refills: 0 | Status: SHIPPED
Start: 2021-12-27 | End: 2022-01-26 | Stop reason: SDUPTHER

## 2022-01-01 ENCOUNTER — OFFICE VISIT (OUTPATIENT)
Dept: CARDIOLOGY CLINIC | Age: 72
End: 2022-01-01
Payer: MEDICARE

## 2022-01-01 VITALS
OXYGEN SATURATION: 99 % | DIASTOLIC BLOOD PRESSURE: 68 MMHG | WEIGHT: 220.8 LBS | RESPIRATION RATE: 16 BRPM | HEIGHT: 60 IN | SYSTOLIC BLOOD PRESSURE: 104 MMHG | HEART RATE: 86 BPM | BODY MASS INDEX: 43.35 KG/M2

## 2022-01-01 DIAGNOSIS — I50.31 ACUTE DIASTOLIC (CONGESTIVE) HEART FAILURE (HCC): Primary | ICD-10-CM

## 2022-01-01 PROCEDURE — 93000 ELECTROCARDIOGRAM COMPLETE: CPT | Performed by: NURSE PRACTITIONER

## 2022-01-09 ENCOUNTER — APPOINTMENT (OUTPATIENT)
Dept: CT IMAGING | Age: 72
End: 2022-01-09
Payer: MEDICARE

## 2022-01-09 ENCOUNTER — HOSPITAL ENCOUNTER (OUTPATIENT)
Age: 72
Setting detail: OBSERVATION
Discharge: HOME | End: 2022-01-11
Attending: EMERGENCY MEDICINE | Admitting: INTERNAL MEDICINE
Payer: MEDICARE

## 2022-01-09 ENCOUNTER — APPOINTMENT (OUTPATIENT)
Dept: GENERAL RADIOLOGY | Age: 72
End: 2022-01-09
Payer: MEDICARE

## 2022-01-09 DIAGNOSIS — R07.9 CHEST PAIN, UNSPECIFIED TYPE: Primary | ICD-10-CM

## 2022-01-09 LAB
ALBUMIN SERPL-MCNC: 3.6 G/DL (ref 3.5–5.2)
ALP BLD-CCNC: 80 U/L (ref 35–104)
ALT SERPL-CCNC: <5 U/L (ref 0–32)
ANION GAP SERPL CALCULATED.3IONS-SCNC: 8 MMOL/L (ref 7–16)
AST SERPL-CCNC: 10 U/L (ref 0–31)
BASOPHILS ABSOLUTE: 0.06 E9/L (ref 0–0.2)
BASOPHILS RELATIVE PERCENT: 1 % (ref 0–2)
BILIRUB SERPL-MCNC: 0.3 MG/DL (ref 0–1.2)
BUN BLDV-MCNC: 16 MG/DL (ref 6–23)
CALCIUM SERPL-MCNC: 8.8 MG/DL (ref 8.6–10.2)
CHLORIDE BLD-SCNC: 107 MMOL/L (ref 98–107)
CHP ED QC CHECK: YES
CO2: 26 MMOL/L (ref 22–29)
CREAT SERPL-MCNC: 0.9 MG/DL (ref 0.5–1)
D DIMER: 247 NG/ML DDU
EOSINOPHILS ABSOLUTE: 0.05 E9/L (ref 0.05–0.5)
EOSINOPHILS RELATIVE PERCENT: 0.8 % (ref 0–6)
GFR AFRICAN AMERICAN: >60
GFR NON-AFRICAN AMERICAN: >60 ML/MIN/1.73
GLUCOSE BLD-MCNC: 167 MG/DL
GLUCOSE BLD-MCNC: 198 MG/DL (ref 74–99)
HCT VFR BLD CALC: 38.8 % (ref 34–48)
HEMOGLOBIN: 11.7 G/DL (ref 11.5–15.5)
IMMATURE GRANULOCYTES #: 0.03 E9/L
IMMATURE GRANULOCYTES %: 0.5 % (ref 0–5)
LYMPHOCYTES ABSOLUTE: 1.54 E9/L (ref 1.5–4)
LYMPHOCYTES RELATIVE PERCENT: 25.7 % (ref 20–42)
MCH RBC QN AUTO: 26.3 PG (ref 26–35)
MCHC RBC AUTO-ENTMCNC: 30.2 % (ref 32–34.5)
MCV RBC AUTO: 87.2 FL (ref 80–99.9)
METER GLUCOSE: 167 MG/DL (ref 74–99)
MONOCYTES ABSOLUTE: 0.27 E9/L (ref 0.1–0.95)
MONOCYTES RELATIVE PERCENT: 4.5 % (ref 2–12)
NEUTROPHILS ABSOLUTE: 4.05 E9/L (ref 1.8–7.3)
NEUTROPHILS RELATIVE PERCENT: 67.5 % (ref 43–80)
PDW BLD-RTO: 14.8 FL (ref 11.5–15)
PLATELET # BLD: 246 E9/L (ref 130–450)
PMV BLD AUTO: 9.2 FL (ref 7–12)
POTASSIUM REFLEX MAGNESIUM: 4.6 MMOL/L (ref 3.5–5)
RBC # BLD: 4.45 E12/L (ref 3.5–5.5)
SARS-COV-2, NAAT: NOT DETECTED
SODIUM BLD-SCNC: 141 MMOL/L (ref 132–146)
TOTAL PROTEIN: 6.3 G/DL (ref 6.4–8.3)
TROPONIN, HIGH SENSITIVITY: 18 NG/L (ref 0–9)
TROPONIN, HIGH SENSITIVITY: 20 NG/L (ref 0–9)
TROPONIN, HIGH SENSITIVITY: 21 NG/L (ref 0–9)
WBC # BLD: 6 E9/L (ref 4.5–11.5)

## 2022-01-09 PROCEDURE — 80053 COMPREHEN METABOLIC PANEL: CPT

## 2022-01-09 PROCEDURE — 36415 COLL VENOUS BLD VENIPUNCTURE: CPT

## 2022-01-09 PROCEDURE — 99285 EMERGENCY DEPT VISIT HI MDM: CPT

## 2022-01-09 PROCEDURE — G0378 HOSPITAL OBSERVATION PER HR: HCPCS

## 2022-01-09 PROCEDURE — 6360000004 HC RX CONTRAST MEDICATION: Performed by: RADIOLOGY

## 2022-01-09 PROCEDURE — 71045 X-RAY EXAM CHEST 1 VIEW: CPT

## 2022-01-09 PROCEDURE — 71275 CT ANGIOGRAPHY CHEST: CPT

## 2022-01-09 PROCEDURE — 85378 FIBRIN DEGRADE SEMIQUANT: CPT

## 2022-01-09 PROCEDURE — 82962 GLUCOSE BLOOD TEST: CPT

## 2022-01-09 PROCEDURE — 2580000003 HC RX 258: Performed by: RADIOLOGY

## 2022-01-09 PROCEDURE — 87635 SARS-COV-2 COVID-19 AMP PRB: CPT

## 2022-01-09 PROCEDURE — 2580000003 HC RX 258: Performed by: INTERNAL MEDICINE

## 2022-01-09 PROCEDURE — 85025 COMPLETE CBC W/AUTO DIFF WBC: CPT

## 2022-01-09 PROCEDURE — 6370000000 HC RX 637 (ALT 250 FOR IP): Performed by: INTERNAL MEDICINE

## 2022-01-09 PROCEDURE — 6370000000 HC RX 637 (ALT 250 FOR IP): Performed by: EMERGENCY MEDICINE

## 2022-01-09 PROCEDURE — 84484 ASSAY OF TROPONIN QUANT: CPT

## 2022-01-09 PROCEDURE — 93005 ELECTROCARDIOGRAM TRACING: CPT | Performed by: EMERGENCY MEDICINE

## 2022-01-09 RX ORDER — SODIUM CHLORIDE 0.9 % (FLUSH) 0.9 %
10 SYRINGE (ML) INJECTION
Status: COMPLETED | OUTPATIENT
Start: 2022-01-09 | End: 2022-01-09

## 2022-01-09 RX ORDER — CARBIDOPA AND LEVODOPA 25; 100 MG/1; MG/1
2 TABLET, EXTENDED RELEASE ORAL 3 TIMES DAILY
Status: DISCONTINUED | OUTPATIENT
Start: 2022-01-10 | End: 2022-01-11 | Stop reason: HOSPADM

## 2022-01-09 RX ORDER — INSULIN GLARGINE 100 [IU]/ML
10 INJECTION, SOLUTION SUBCUTANEOUS NIGHTLY
Status: DISCONTINUED | OUTPATIENT
Start: 2022-01-09 | End: 2022-01-11 | Stop reason: HOSPADM

## 2022-01-09 RX ORDER — LOSARTAN POTASSIUM 25 MG/1
50 TABLET ORAL DAILY
Status: DISCONTINUED | OUTPATIENT
Start: 2022-01-10 | End: 2022-01-11 | Stop reason: HOSPADM

## 2022-01-09 RX ORDER — CHOLECALCIFEROL (VITAMIN D3) 50 MCG
1000 TABLET ORAL DAILY
Status: DISCONTINUED | OUTPATIENT
Start: 2022-01-10 | End: 2022-01-11 | Stop reason: HOSPADM

## 2022-01-09 RX ORDER — LAMOTRIGINE 100 MG/1
200 TABLET ORAL DAILY
Status: DISCONTINUED | OUTPATIENT
Start: 2022-01-10 | End: 2022-01-11 | Stop reason: HOSPADM

## 2022-01-09 RX ORDER — DEXTROSE MONOHYDRATE 50 MG/ML
100 INJECTION, SOLUTION INTRAVENOUS PRN
Status: DISCONTINUED | OUTPATIENT
Start: 2022-01-09 | End: 2022-01-11 | Stop reason: HOSPADM

## 2022-01-09 RX ORDER — ONDANSETRON 2 MG/ML
4 INJECTION INTRAMUSCULAR; INTRAVENOUS EVERY 6 HOURS PRN
Status: DISCONTINUED | OUTPATIENT
Start: 2022-01-09 | End: 2022-01-11 | Stop reason: HOSPADM

## 2022-01-09 RX ORDER — ASPIRIN 325 MG
325 TABLET ORAL ONCE
Status: COMPLETED | OUTPATIENT
Start: 2022-01-09 | End: 2022-01-09

## 2022-01-09 RX ORDER — POLYETHYLENE GLYCOL 3350 17 G/17G
17 POWDER, FOR SOLUTION ORAL DAILY PRN
Status: DISCONTINUED | OUTPATIENT
Start: 2022-01-09 | End: 2022-01-11 | Stop reason: HOSPADM

## 2022-01-09 RX ORDER — BUSPIRONE HYDROCHLORIDE 5 MG/1
5 TABLET ORAL 3 TIMES DAILY
Status: DISCONTINUED | OUTPATIENT
Start: 2022-01-09 | End: 2022-01-11 | Stop reason: HOSPADM

## 2022-01-09 RX ORDER — CARBIDOPA AND LEVODOPA 50; 200 MG/1; MG/1
1 TABLET, EXTENDED RELEASE ORAL 3 TIMES DAILY
Status: DISCONTINUED | OUTPATIENT
Start: 2022-01-09 | End: 2022-01-09 | Stop reason: RX

## 2022-01-09 RX ORDER — ASPIRIN 81 MG/1
81 TABLET, CHEWABLE ORAL NIGHTLY
Status: DISCONTINUED | OUTPATIENT
Start: 2022-01-09 | End: 2022-01-11 | Stop reason: HOSPADM

## 2022-01-09 RX ORDER — VENLAFAXINE HYDROCHLORIDE 150 MG/1
150 CAPSULE, EXTENDED RELEASE ORAL DAILY
Status: DISCONTINUED | OUTPATIENT
Start: 2022-01-10 | End: 2022-01-11 | Stop reason: HOSPADM

## 2022-01-09 RX ORDER — SODIUM CHLORIDE 0.9 % (FLUSH) 0.9 %
5-40 SYRINGE (ML) INJECTION EVERY 12 HOURS SCHEDULED
Status: DISCONTINUED | OUTPATIENT
Start: 2022-01-09 | End: 2022-01-11 | Stop reason: HOSPADM

## 2022-01-09 RX ORDER — PANTOPRAZOLE SODIUM 40 MG/1
40 TABLET, DELAYED RELEASE ORAL
Status: DISCONTINUED | OUTPATIENT
Start: 2022-01-10 | End: 2022-01-11 | Stop reason: HOSPADM

## 2022-01-09 RX ORDER — MULTIVITAMIN WITH IRON
1 TABLET ORAL DAILY
Status: DISCONTINUED | OUTPATIENT
Start: 2022-01-10 | End: 2022-01-11 | Stop reason: HOSPADM

## 2022-01-09 RX ORDER — AMLODIPINE BESYLATE 5 MG/1
5 TABLET ORAL DAILY
Status: DISCONTINUED | OUTPATIENT
Start: 2022-01-10 | End: 2022-01-10

## 2022-01-09 RX ORDER — SODIUM CHLORIDE 0.9 % (FLUSH) 0.9 %
5-40 SYRINGE (ML) INJECTION PRN
Status: DISCONTINUED | OUTPATIENT
Start: 2022-01-09 | End: 2022-01-11 | Stop reason: HOSPADM

## 2022-01-09 RX ORDER — SODIUM CHLORIDE 9 MG/ML
25 INJECTION, SOLUTION INTRAVENOUS PRN
Status: DISCONTINUED | OUTPATIENT
Start: 2022-01-09 | End: 2022-01-11 | Stop reason: HOSPADM

## 2022-01-09 RX ORDER — DOXEPIN HYDROCHLORIDE 10 MG/1
10 CAPSULE ORAL NIGHTLY
Status: DISCONTINUED | OUTPATIENT
Start: 2022-01-09 | End: 2022-01-11 | Stop reason: HOSPADM

## 2022-01-09 RX ORDER — ONDANSETRON 4 MG/1
4 TABLET, ORALLY DISINTEGRATING ORAL EVERY 8 HOURS PRN
Status: DISCONTINUED | OUTPATIENT
Start: 2022-01-09 | End: 2022-01-11 | Stop reason: HOSPADM

## 2022-01-09 RX ORDER — ATORVASTATIN CALCIUM 20 MG/1
20 TABLET, FILM COATED ORAL DAILY
Status: DISCONTINUED | OUTPATIENT
Start: 2022-01-10 | End: 2022-01-11 | Stop reason: HOSPADM

## 2022-01-09 RX ORDER — ROPINIROLE 1 MG/1
1 TABLET, FILM COATED ORAL 3 TIMES DAILY
Status: DISCONTINUED | OUTPATIENT
Start: 2022-01-09 | End: 2022-01-11 | Stop reason: HOSPADM

## 2022-01-09 RX ORDER — ACETAMINOPHEN 650 MG/1
650 SUPPOSITORY RECTAL EVERY 6 HOURS PRN
Status: DISCONTINUED | OUTPATIENT
Start: 2022-01-09 | End: 2022-01-11 | Stop reason: HOSPADM

## 2022-01-09 RX ORDER — DEXTROSE MONOHYDRATE 25 G/50ML
12.5 INJECTION, SOLUTION INTRAVENOUS PRN
Status: DISCONTINUED | OUTPATIENT
Start: 2022-01-09 | End: 2022-01-11 | Stop reason: HOSPADM

## 2022-01-09 RX ORDER — NICOTINE POLACRILEX 4 MG
15 LOZENGE BUCCAL PRN
Status: DISCONTINUED | OUTPATIENT
Start: 2022-01-09 | End: 2022-01-11 | Stop reason: HOSPADM

## 2022-01-09 RX ORDER — CARBIDOPA AND LEVODOPA 25; 100 MG/1; MG/1
2 TABLET, EXTENDED RELEASE ORAL 3 TIMES DAILY
Status: DISCONTINUED | OUTPATIENT
Start: 2022-01-09 | End: 2022-01-09

## 2022-01-09 RX ORDER — ACETAMINOPHEN 325 MG/1
650 TABLET ORAL EVERY 6 HOURS PRN
Status: DISCONTINUED | OUTPATIENT
Start: 2022-01-09 | End: 2022-01-11 | Stop reason: HOSPADM

## 2022-01-09 RX ADMIN — Medication 10 ML: at 23:05

## 2022-01-09 RX ADMIN — INSULIN LISPRO 1 UNITS: 100 INJECTION, SOLUTION INTRAVENOUS; SUBCUTANEOUS at 23:13

## 2022-01-09 RX ADMIN — Medication 10 ML: at 15:13

## 2022-01-09 RX ADMIN — INSULIN GLARGINE 10 UNITS: 100 INJECTION, SOLUTION SUBCUTANEOUS at 23:13

## 2022-01-09 RX ADMIN — ASPIRIN 325 MG: 325 TABLET ORAL at 12:40

## 2022-01-09 RX ADMIN — IOPAMIDOL 60 ML: 755 INJECTION, SOLUTION INTRAVENOUS at 15:12

## 2022-01-09 ASSESSMENT — PAIN DESCRIPTION - LOCATION
LOCATION: CHEST
LOCATION: CHEST

## 2022-01-09 ASSESSMENT — PAIN DESCRIPTION - PAIN TYPE
TYPE: ACUTE PAIN
TYPE: ACUTE PAIN

## 2022-01-09 ASSESSMENT — PAIN SCALES - GENERAL
PAINLEVEL_OUTOF10: 4
PAINLEVEL_OUTOF10: 6

## 2022-01-09 ASSESSMENT — PAIN DESCRIPTION - DESCRIPTORS: DESCRIPTORS: DISCOMFORT;CONSTANT

## 2022-01-09 ASSESSMENT — PAIN DESCRIPTION - ORIENTATION: ORIENTATION: RIGHT

## 2022-01-09 NOTE — ED NOTES
Bed: 09  Expected date: 1/9/22  Expected time:   Means of arrival: Ambulance  Comments:  MAHESH Snow RN  01/09/22 2450

## 2022-01-10 ENCOUNTER — APPOINTMENT (OUTPATIENT)
Dept: NUCLEAR MEDICINE | Age: 72
End: 2022-01-10
Payer: MEDICARE

## 2022-01-10 ENCOUNTER — APPOINTMENT (OUTPATIENT)
Dept: NON INVASIVE DIAGNOSTICS | Age: 72
End: 2022-01-10
Payer: MEDICARE

## 2022-01-10 LAB
ANION GAP SERPL CALCULATED.3IONS-SCNC: 10 MMOL/L (ref 7–16)
ANION GAP SERPL CALCULATED.3IONS-SCNC: 11 MMOL/L (ref 7–16)
BASOPHILS ABSOLUTE: 0.04 E9/L (ref 0–0.2)
BASOPHILS ABSOLUTE: 0.06 E9/L (ref 0–0.2)
BASOPHILS RELATIVE PERCENT: 0.8 % (ref 0–2)
BASOPHILS RELATIVE PERCENT: 1 % (ref 0–2)
BUN BLDV-MCNC: 13 MG/DL (ref 6–23)
BUN BLDV-MCNC: 15 MG/DL (ref 6–23)
CALCIUM SERPL-MCNC: 9 MG/DL (ref 8.6–10.2)
CALCIUM SERPL-MCNC: 9 MG/DL (ref 8.6–10.2)
CHLORIDE BLD-SCNC: 104 MMOL/L (ref 98–107)
CHLORIDE BLD-SCNC: 107 MMOL/L (ref 98–107)
CO2: 25 MMOL/L (ref 22–29)
CO2: 26 MMOL/L (ref 22–29)
CREAT SERPL-MCNC: 0.8 MG/DL (ref 0.5–1)
CREAT SERPL-MCNC: 0.8 MG/DL (ref 0.5–1)
EKG ATRIAL RATE: 88 BPM
EKG P AXIS: 67 DEGREES
EKG P-R INTERVAL: 168 MS
EKG Q-T INTERVAL: 386 MS
EKG QRS DURATION: 90 MS
EKG QTC CALCULATION (BAZETT): 467 MS
EKG R AXIS: 48 DEGREES
EKG T AXIS: 53 DEGREES
EKG VENTRICULAR RATE: 88 BPM
EOSINOPHILS ABSOLUTE: 0.06 E9/L (ref 0.05–0.5)
EOSINOPHILS ABSOLUTE: 0.09 E9/L (ref 0.05–0.5)
EOSINOPHILS RELATIVE PERCENT: 1.2 % (ref 0–6)
EOSINOPHILS RELATIVE PERCENT: 1.6 % (ref 0–6)
GFR AFRICAN AMERICAN: >60
GFR AFRICAN AMERICAN: >60
GFR NON-AFRICAN AMERICAN: >60 ML/MIN/1.73
GFR NON-AFRICAN AMERICAN: >60 ML/MIN/1.73
GLUCOSE BLD-MCNC: 141 MG/DL (ref 74–99)
GLUCOSE BLD-MCNC: 174 MG/DL (ref 74–99)
HCT VFR BLD CALC: 37.3 % (ref 34–48)
HCT VFR BLD CALC: 39.1 % (ref 34–48)
HEMOGLOBIN: 11.2 G/DL (ref 11.5–15.5)
HEMOGLOBIN: 11.8 G/DL (ref 11.5–15.5)
IMMATURE GRANULOCYTES #: 0.02 E9/L
IMMATURE GRANULOCYTES #: 0.03 E9/L
IMMATURE GRANULOCYTES %: 0.4 % (ref 0–5)
IMMATURE GRANULOCYTES %: 0.5 % (ref 0–5)
LV EF: 74 %
LVEF MODALITY: NORMAL
LYMPHOCYTES ABSOLUTE: 1.33 E9/L (ref 1.5–4)
LYMPHOCYTES ABSOLUTE: 1.57 E9/L (ref 1.5–4)
LYMPHOCYTES RELATIVE PERCENT: 26 % (ref 20–42)
LYMPHOCYTES RELATIVE PERCENT: 27.3 % (ref 20–42)
MCH RBC QN AUTO: 26.3 PG (ref 26–35)
MCH RBC QN AUTO: 26.4 PG (ref 26–35)
MCHC RBC AUTO-ENTMCNC: 30 % (ref 32–34.5)
MCHC RBC AUTO-ENTMCNC: 30.2 % (ref 32–34.5)
MCV RBC AUTO: 87.1 FL (ref 80–99.9)
MCV RBC AUTO: 88 FL (ref 80–99.9)
METER GLUCOSE: 134 MG/DL (ref 74–99)
METER GLUCOSE: 153 MG/DL (ref 74–99)
METER GLUCOSE: 176 MG/DL (ref 74–99)
METER GLUCOSE: 198 MG/DL (ref 74–99)
MONOCYTES ABSOLUTE: 0.26 E9/L (ref 0.1–0.95)
MONOCYTES ABSOLUTE: 0.3 E9/L (ref 0.1–0.95)
MONOCYTES RELATIVE PERCENT: 5.1 % (ref 2–12)
MONOCYTES RELATIVE PERCENT: 5.2 % (ref 2–12)
NEUTROPHILS ABSOLUTE: 3.41 E9/L (ref 1.8–7.3)
NEUTROPHILS ABSOLUTE: 3.7 E9/L (ref 1.8–7.3)
NEUTROPHILS RELATIVE PERCENT: 64.4 % (ref 43–80)
NEUTROPHILS RELATIVE PERCENT: 66.5 % (ref 43–80)
PDW BLD-RTO: 14.8 FL (ref 11.5–15)
PDW BLD-RTO: 14.8 FL (ref 11.5–15)
PLATELET # BLD: 225 E9/L (ref 130–450)
PLATELET # BLD: 227 E9/L (ref 130–450)
PMV BLD AUTO: 9.2 FL (ref 7–12)
PMV BLD AUTO: 9.5 FL (ref 7–12)
POTASSIUM REFLEX MAGNESIUM: 3.7 MMOL/L (ref 3.5–5)
POTASSIUM REFLEX MAGNESIUM: 3.8 MMOL/L (ref 3.5–5)
RBC # BLD: 4.24 E12/L (ref 3.5–5.5)
RBC # BLD: 4.49 E12/L (ref 3.5–5.5)
SODIUM BLD-SCNC: 141 MMOL/L (ref 132–146)
SODIUM BLD-SCNC: 142 MMOL/L (ref 132–146)
WBC # BLD: 5.1 E9/L (ref 4.5–11.5)
WBC # BLD: 5.8 E9/L (ref 4.5–11.5)

## 2022-01-10 PROCEDURE — 2580000003 HC RX 258: Performed by: INTERNAL MEDICINE

## 2022-01-10 PROCEDURE — 93016 CV STRESS TEST SUPVJ ONLY: CPT | Performed by: INTERNAL MEDICINE

## 2022-01-10 PROCEDURE — 93018 CV STRESS TEST I&R ONLY: CPT | Performed by: INTERNAL MEDICINE

## 2022-01-10 PROCEDURE — 82962 GLUCOSE BLOOD TEST: CPT

## 2022-01-10 PROCEDURE — 6360000002 HC RX W HCPCS: Performed by: STUDENT IN AN ORGANIZED HEALTH CARE EDUCATION/TRAINING PROGRAM

## 2022-01-10 PROCEDURE — 96372 THER/PROPH/DIAG INJ SC/IM: CPT

## 2022-01-10 PROCEDURE — 80048 BASIC METABOLIC PNL TOTAL CA: CPT

## 2022-01-10 PROCEDURE — 6370000000 HC RX 637 (ALT 250 FOR IP): Performed by: INTERNAL MEDICINE

## 2022-01-10 PROCEDURE — 93010 ELECTROCARDIOGRAM REPORT: CPT | Performed by: INTERNAL MEDICINE

## 2022-01-10 PROCEDURE — 6370000000 HC RX 637 (ALT 250 FOR IP): Performed by: STUDENT IN AN ORGANIZED HEALTH CARE EDUCATION/TRAINING PROGRAM

## 2022-01-10 PROCEDURE — A9500 TC99M SESTAMIBI: HCPCS | Performed by: RADIOLOGY

## 2022-01-10 PROCEDURE — G0378 HOSPITAL OBSERVATION PER HR: HCPCS

## 2022-01-10 PROCEDURE — 36415 COLL VENOUS BLD VENIPUNCTURE: CPT

## 2022-01-10 PROCEDURE — 6360000002 HC RX W HCPCS: Performed by: INTERNAL MEDICINE

## 2022-01-10 PROCEDURE — 93017 CV STRESS TEST TRACING ONLY: CPT

## 2022-01-10 PROCEDURE — 85025 COMPLETE CBC W/AUTO DIFF WBC: CPT

## 2022-01-10 PROCEDURE — 78452 HT MUSCLE IMAGE SPECT MULT: CPT

## 2022-01-10 PROCEDURE — 3430000000 HC RX DIAGNOSTIC RADIOPHARMACEUTICAL: Performed by: RADIOLOGY

## 2022-01-10 PROCEDURE — 78452 HT MUSCLE IMAGE SPECT MULT: CPT | Performed by: INTERNAL MEDICINE

## 2022-01-10 RX ORDER — DICYCLOMINE HYDROCHLORIDE 10 MG/1
10 CAPSULE ORAL 3 TIMES DAILY PRN
Status: DISCONTINUED | OUTPATIENT
Start: 2022-01-10 | End: 2022-01-11 | Stop reason: HOSPADM

## 2022-01-10 RX ORDER — AMLODIPINE BESYLATE 10 MG/1
10 TABLET ORAL DAILY
Status: DISCONTINUED | OUTPATIENT
Start: 2022-01-11 | End: 2022-01-11 | Stop reason: HOSPADM

## 2022-01-10 RX ORDER — ALPRAZOLAM 0.5 MG/1
0.5 TABLET ORAL DAILY PRN
Status: ON HOLD | COMMUNITY
End: 2022-05-17 | Stop reason: SDUPTHER

## 2022-01-10 RX ORDER — ROPINIROLE 0.5 MG/1
0.5 TABLET, FILM COATED ORAL 2 TIMES DAILY WITH MEALS
Status: ON HOLD | COMMUNITY
End: 2022-01-10 | Stop reason: ALTCHOICE

## 2022-01-10 RX ORDER — MECLIZINE HYDROCHLORIDE 25 MG/1
25 TABLET ORAL 3 TIMES DAILY PRN
Status: ON HOLD | COMMUNITY
End: 2022-04-19 | Stop reason: HOSPADM

## 2022-01-10 RX ORDER — ROPINIROLE 1 MG/1
1 TABLET, FILM COATED ORAL 3 TIMES DAILY
COMMUNITY
End: 2022-02-07 | Stop reason: SDUPTHER

## 2022-01-10 RX ORDER — ROPINIROLE 1 MG/1
1 TABLET, FILM COATED ORAL NIGHTLY
Status: ON HOLD | COMMUNITY
End: 2022-01-10 | Stop reason: ALTCHOICE

## 2022-01-10 RX ORDER — LOPERAMIDE HYDROCHLORIDE 2 MG/1
2 CAPSULE ORAL 4 TIMES DAILY PRN
Status: DISCONTINUED | OUTPATIENT
Start: 2022-01-10 | End: 2022-01-11 | Stop reason: HOSPADM

## 2022-01-10 RX ORDER — HYDRALAZINE HYDROCHLORIDE 20 MG/ML
10 INJECTION INTRAMUSCULAR; INTRAVENOUS EVERY 6 HOURS PRN
Status: DISCONTINUED | OUTPATIENT
Start: 2022-01-10 | End: 2022-01-11 | Stop reason: HOSPADM

## 2022-01-10 RX ORDER — MONTELUKAST SODIUM 10 MG/1
10 TABLET ORAL NIGHTLY
COMMUNITY

## 2022-01-10 RX ORDER — DICYCLOMINE HYDROCHLORIDE 10 MG/1
10 CAPSULE ORAL 3 TIMES DAILY PRN
Status: DISCONTINUED | OUTPATIENT
Start: 2022-01-10 | End: 2022-01-10

## 2022-01-10 RX ADMIN — ASPIRIN 81 MG CHEWABLE TABLET 81 MG: 81 TABLET CHEWABLE at 21:43

## 2022-01-10 RX ADMIN — ACETAMINOPHEN 650 MG: 325 TABLET ORAL at 03:43

## 2022-01-10 RX ADMIN — ROPINIROLE HYDROCHLORIDE 1 MG: 1 TABLET, FILM COATED ORAL at 15:05

## 2022-01-10 RX ADMIN — LOPERAMIDE HYDROCHLORIDE 2 MG: 2 CAPSULE ORAL at 18:04

## 2022-01-10 RX ADMIN — BUSPIRONE HYDROCHLORIDE 5 MG: 5 TABLET ORAL at 15:04

## 2022-01-10 RX ADMIN — CARBIDOPA AND LEVODOPA 2 TABLET: 25; 100 TABLET, EXTENDED RELEASE ORAL at 15:05

## 2022-01-10 RX ADMIN — LOSARTAN POTASSIUM 50 MG: 25 TABLET, FILM COATED ORAL at 15:04

## 2022-01-10 RX ADMIN — Medication 31 MILLICURIE: at 12:15

## 2022-01-10 RX ADMIN — DICYCLOMINE HYDROCHLORIDE 10 MG: 10 CAPSULE ORAL at 18:04

## 2022-01-10 RX ADMIN — AMLODIPINE BESYLATE 5 MG: 5 TABLET ORAL at 15:04

## 2022-01-10 RX ADMIN — Medication 11 MILLICURIE: at 09:05

## 2022-01-10 RX ADMIN — DOXEPIN HYDROCHLORIDE 10 MG: 10 CAPSULE ORAL at 03:43

## 2022-01-10 RX ADMIN — DOXEPIN HYDROCHLORIDE 10 MG: 10 CAPSULE ORAL at 21:43

## 2022-01-10 RX ADMIN — REGADENOSON 0.4 MG: 0.08 INJECTION, SOLUTION INTRAVENOUS at 12:15

## 2022-01-10 RX ADMIN — CARBIDOPA AND LEVODOPA 2 TABLET: 25; 100 TABLET, EXTENDED RELEASE ORAL at 21:43

## 2022-01-10 RX ADMIN — INSULIN GLARGINE 10 UNITS: 100 INJECTION, SOLUTION SUBCUTANEOUS at 21:50

## 2022-01-10 RX ADMIN — Medication 1 TABLET: at 15:04

## 2022-01-10 RX ADMIN — INSULIN LISPRO 1 UNITS: 100 INJECTION, SOLUTION INTRAVENOUS; SUBCUTANEOUS at 16:51

## 2022-01-10 RX ADMIN — BUSPIRONE HYDROCHLORIDE 5 MG: 5 TABLET ORAL at 21:43

## 2022-01-10 RX ADMIN — INSULIN LISPRO 1 UNITS: 100 INJECTION, SOLUTION INTRAVENOUS; SUBCUTANEOUS at 15:02

## 2022-01-10 RX ADMIN — ATORVASTATIN CALCIUM 20 MG: 20 TABLET, FILM COATED ORAL at 15:05

## 2022-01-10 RX ADMIN — ROPINIROLE HYDROCHLORIDE 1 MG: 1 TABLET, FILM COATED ORAL at 07:02

## 2022-01-10 RX ADMIN — INSULIN LISPRO 1 UNITS: 100 INJECTION, SOLUTION INTRAVENOUS; SUBCUTANEOUS at 21:50

## 2022-01-10 RX ADMIN — ROPINIROLE HYDROCHLORIDE 1 MG: 1 TABLET, FILM COATED ORAL at 21:43

## 2022-01-10 RX ADMIN — LAMOTRIGINE 200 MG: 100 TABLET ORAL at 15:04

## 2022-01-10 RX ADMIN — Medication 10 ML: at 21:43

## 2022-01-10 RX ADMIN — Medication 10 ML: at 07:30

## 2022-01-10 RX ADMIN — Medication 1000 UNITS: at 15:06

## 2022-01-10 RX ADMIN — VENLAFAXINE HYDROCHLORIDE 150 MG: 150 CAPSULE, EXTENDED RELEASE ORAL at 15:05

## 2022-01-10 RX ADMIN — ENOXAPARIN SODIUM 40 MG: 100 INJECTION SUBCUTANEOUS at 21:43

## 2022-01-10 ASSESSMENT — PAIN DESCRIPTION - DESCRIPTORS
DESCRIPTORS: DULL;ACHING
DESCRIPTORS: ACHING;DISCOMFORT;DULL

## 2022-01-10 ASSESSMENT — PAIN SCALES - GENERAL
PAINLEVEL_OUTOF10: 10
PAINLEVEL_OUTOF10: 0
PAINLEVEL_OUTOF10: 8
PAINLEVEL_OUTOF10: 0

## 2022-01-10 ASSESSMENT — PAIN DESCRIPTION - PAIN TYPE
TYPE: ACUTE PAIN
TYPE: ACUTE PAIN

## 2022-01-10 ASSESSMENT — PAIN DESCRIPTION - ORIENTATION
ORIENTATION: RIGHT;UPPER
ORIENTATION: RIGHT

## 2022-01-10 ASSESSMENT — PAIN DESCRIPTION - LOCATION
LOCATION: CHEST
LOCATION: CHEST

## 2022-01-10 NOTE — CARE COORDINATION
Transition of care: Met with pt in room. Pt lives alone in a 3rd floor apartment. Has elevator access. Independent with ADLs. Her friends or she takes the bus provided by [place] [id number] for transportation. DME- rollator, quad cane, glucometer, nebulizer, bp cuff/moitor, emergency button and pulse ox. Discharge plan is to return home. Voiced no needs at present. Pt unsure of name of [facility] pcp. States has name of physician at home and is setup to see them on Feb 16th. Pharmacy is [facility] on [address].  Sw/cm will follow

## 2022-01-10 NOTE — PROGRESS NOTES
IM residents notified via [employer] of patient complaints of diarrhea and wanting something for it.

## 2022-01-10 NOTE — PROGRESS NOTES
Message sent via [employer] to dr. Dom Moran  that pt c/o CP #10 to R upper chest-described as a \"DULL\" pain.   Also notified about cta pulmonary results

## 2022-01-10 NOTE — DISCHARGE SUMMARY
[address]  Discharge Summary    PCP: Jaclyn Zapata MD    Admit Date:1/9/2022  Discharge Date: ***    Admission Diagnosis:   1. Atypical chest pain ***  2. Hx of HTN  3. Hx of HLD  4. Confluent density of RUL on CTA, covid PNA vs viral PNA vs effusion vs artifact  5. T2DM  6. Anxiety/depression  7. Parkinson Disease  8. Restless Leg Syndrome    Discharge Diagnosis:  1. ***    Hospital Course:   Dominick Dillon is a 65 yo female with past medical history depression, anxiety, asthma, CAD, breast CA s/p lumpectomy, CKD, SERENA on CPAP, T2DM, HTN, HLD, hypothyroidism, Parkinson, and restless leg syndrome who presented with cc of right sided chest pain for several days. Symptoms first started 3 days prior to arrival with intermittent L arm pain, increased dyspnea on exertion, and increased bilateral leg swelling. The R sided chest pain began the morning prior to arrival, described as pinching in quality, nonradiating, and intermittently lasting 3-4 minutes with self-resolution. Additionally, she endorsed L neck pain, nausea, headache, fatigue, and worsening of her baseline orthopnea. States she had a positive covid test 2 weeks prior, with a negative test a week later. In the ED vitals significant for /88, HR 74, RR 18, afebrile, and normal saturations on RA. Labs and CXR largely insignificant; troponin trended 21-18-20, D-dimer 247. EKG showed NSR. CTA pulm demonstrated a confluent density in the RUL and artifacts that could not definitively r/o PE. Home medications for HTN, HLD, T2DM, Parkinson, restless leg syndrome, and anxiety/depression were continued. Patient was taken for chemical stress test 1/10/22 which demonstrated large sized, fixed defect in inferior and inferiolateral wall suggestive of prior infarct without any reversibility, as well as a small sized perfusion defect involving mid to distal anterior wall suggestive of ischemia.  Patient also with consistently high blood pressure readings to 167/79. Given history of Parkinson disease and use of carbidopa/levodopa, orthostatic changes were ruled out before increasing home amlodipine to 10mg. Overnight into 1/11/22, patient had several episodes of diarrhea responsive to loperamide and dicyclomine, and C. Diff toxin was sent out. Cardiology was consulted for the abnormal stress test, and she was taken for catheterization that found mild to moderate CAD with 50% stenosis of LAD and circumflex, with recommendations for medical management. Repeat echo on 1/11/22 demonstrated ***     Significant findings (history and exam, laboratory, radiological, pathology, other tests):   · General Appearance: {Exam; general:75383::\"alert\",\"appears stated age\",\"cooperative\"}  · HEENT:  {Exam; heent:33911}  · Neck: {neck exam:80750::\"no adenopathy\",\"no carotid bruit\",\"no JVD\",\"supple, symmetrical, trachea midline\",\"thyroid not enlarged, symmetric, no tenderness/mass/nodules\"}  · Lung: {lung exam:91937::\"clear to auscultation bilaterally\"}  · Heart: {heart exam:5510::\"regular rate and rhythm, S1, S2 normal, no murmur, click, rub or gallop\"}  · Abdomen: {abdominal exam:30692::\"soft, non-tender; bowel sounds normal; no masses,  no organomegaly\"}  · Extremities:  {extremity exam:5109::\"extremities normal, atraumatic, no cyanosis or edema\"}  · Musculokeletal: No joint swelling, no muscle tenderness. ROM normal in all joints of extremities. · Neurologic: Mental status: {Exam; neuro mental status:60925::\"Alert, oriented, thought content appropriate\"}    Pending test results:   1. ***    Consults:  1. ***    Procedures:  1. Stress test  2.  Echo  3. ***    Condition at discharge: Patrick Mally Ephraim Patient Condition:[id number]}    Disposition: {disposition:00371}    Outpatient Recommendations:  1. ***    Discharge Medications:     Medication List      STOP taking these medications    amLODIPine 5 MG tablet  Commonly known as: NORVASC     busPIRone 5 MG tablet  Commonly known as: BUSPAR        ASK your doctor about these medications    Accu-Chek Mary Plus strip  Generic drug: blood glucose test strips     ALPRAZolam 0.5 MG tablet  Commonly known as: XANAX     aspirin 81 MG tablet     atorvastatin 20 MG tablet  Commonly known as: LIPITOR  Take 1 tablet by mouth daily     carbidopa-levodopa  MG per extended release tablet  Commonly known as: Sinemet CR  Take 1 tablet by mouth 3 times daily     doxepin 10 MG capsule  Commonly known as: SINEQUAN     insulin aspart 100 UNIT/ML injection pen  Commonly known as: NovoLOG FlexPen  INJECT 0-10 UNITS INTO THE SKIN THREE TIMES DAILY(BEFORE MEALS) SLIDING SCALE (MAX DAILY 30 UNITS)     insulin glargine 100 UNIT/ML injection vial  Commonly known as: LANTUS  Inject 10 Units into the skin nightly     lamoTRIgine 200 MG tablet  Commonly known as: LAMICTAL     losartan 50 MG tablet  Commonly known as: COZAAR     meclizine 25 MG tablet  Commonly known as: ANTIVERT     montelukast 10 MG tablet  Commonly known as: SINGULAIR     multivitamin Tabs tablet  Take 1 tablet by mouth daily     omeprazole 40 MG delayed release capsule  Commonly known as: PRILOSEC     rOPINIRole 1 MG tablet  Commonly known as: REQUIP  Ask about: Which instructions should I use?     venlafaxine 150 MG extended release capsule  Commonly known as: EFFEXOR XR     Vitamin D3 25 MCG Tabs  Take 1 tablet by mouth daily             *** COPY/PASTE DISCHARGE INSTRUCTIONS HERE ***    Jreomy HERRERA   11:31 AM 1/10/2022      *** DELETE THIS AFTER CLICKING REFRESH AND CHANGING FILE DATE/TIME IN EPIC ***

## 2022-01-10 NOTE — PLAN OF CARE
Patient was having elevated BP since admission. Due to patient' history for parkinson's disease, there was concern for orthostatic hypotension. Patient's lying BP was 168/89 in the morning and  standing blood pressure taken 3 and 5 minutes after standing are 173/105 BPM 92, and 186/89 BPM 87, respectively. plna  Increased amlodipine to 10 mg and ordered PRN meds. Nuclear stress test showed,     1.  ECG during the infusion did not change. 2.  The myocardial perfusion imaging was abnormal.   3.  The abnormality was a large sized, moderate fixed defect involving   the inferior and inferolateral wall suggestive prior infarct without   any reversibility. There was also a small sized mild perfusion defect   involving the mid to distal anterior wall suggestive ischemia. 4.  Overall left ventricular systolic function was normal without   regional wall motion abnormalities. 5.  No transient ischemic dilatation.    6.  High risk general pharmacologic stress test.     Plan   Cardiology consultation given

## 2022-01-10 NOTE — PROGRESS NOTES
[address]   Internal Medicine Residency / [address]    Attending Physician Statement  I have discussed the case, including pertinent history and exam findings with the resident and the team.  I have seen and examined the patient and the key elements of the encounter have been performed by me. I agree with the assessment, plan and orders as documented by the resident. Not in room during rounds  /79, realizing she has PD and on Sinemet would monitor BP in standing positions before adding new med. Review Nuclear stress     Remainder of medical problems as per resident note.       Saray Bhatt MD [facility]  Internal Medicine Residency Faculty

## 2022-01-10 NOTE — PROGRESS NOTES
[facility]  Internal Medicine Residency Program  Progress Note - House Team       Patient:  Chris Devine 66 y.o. female   MRN: [id number]       Date of Service: 1/10/2022    CC: Chest pain  Overnight events: None    Subjective     Patient was seen and examined this morning not in acute distress. Overnight patient did not had any acute events. Patient denied any chest pain, shortness of breath, palpitation, abdominal pain. Patient stress test was done today. Patient came back from stress test.  Stress test showed low risk for CAD. Test need to be followed. Patient mentions some social issues with her friend that is causing her stress. Objective     · Vitals: BP (!) 167/79   Pulse 76   Temp 97.4 °F (36.3 °C) (Temporal)   Resp 18   Ht 5' (1.524 m)   Wt 219 lb 9.6 oz (99.6 kg)   SpO2 98%   BMI 42.89 kg/m²   · Net IO Since Admission: -400 mL [01/10/22 0831]    Physical Exam  Constitutional:       Appearance: Normal appearance. She is obese. HENT:      Head: Normocephalic and atraumatic. Right Ear: Tympanic membrane normal.      Left Ear: Tympanic membrane normal.      Mouth/Throat:      Mouth: Mucous membranes are moist.   Cardiovascular:      Rate and Rhythm: Normal rate and regular rhythm. Pulses: Normal pulses. Heart sounds: Normal heart sounds. No murmur heard. No gallop. Pulmonary:      Effort: Pulmonary effort is normal. No respiratory distress. Breath sounds: Normal breath sounds. No wheezing or rales. Chest:      Chest wall: No tenderness. Abdominal:      General: Abdomen is flat. There is no distension. Palpations: Abdomen is soft. Tenderness: There is no abdominal tenderness. There is no right CVA tenderness or guarding. Musculoskeletal:         General: Normal range of motion. Right lower leg: No edema. Left lower leg: No edema. Skin:     General: Skin is warm.       Capillary Refill: Capillary refill takes less than 2 seconds. Neurological:      General: No focal deficit present. Mental Status: She is alert. Diet:   Diet NPO      Additional Respiratory  Assessments  Pulse: 76  Resp: 18  SpO2: 98 %         Pertinent Labs & Imaging Studies   genesis  CBC:   Recent Labs     01/09/22  1230 01/10/22  0651   WBC 6.0 5.8   HGB 11.7 11.2*   HCT 38.8 37.3   MCV 87.2 88.0    225       BMP:    Recent Labs     01/09/22  1230 01/09/22  2309 01/10/22  0651     --  142   K 4.6  --  3.8     --  107   CO2 26  --  25   BUN 16  --  15   CREATININE 0.9  --  0.8   GLUCOSE 198* 167 141*       LIVER PROFILE:   Recent Labs     01/09/22  1230   AST 10   ALT <5   BILITOT 0.3   ALKPHOS 80       Fasting Lipid Panel:    Lab Results   Component Value Date    CHOL 148 10/25/2021    TRIG 144 10/25/2021    HDL 59 10/25/2021       Cardiac Enzymes:    Lab Results   Component Value Date    CKTOTAL 32 04/25/2014    CKTOTAL 34 04/25/2014    CKTOTAL 51 07/20/2013    CKMB 2.1 04/25/2014    CKMB 2.4 04/25/2014    CKMB 1.3 07/20/2013    TROPONINI <0.01 10/26/2020    TROPONINI <0.01 08/24/2019    TROPONINI <0.01 05/29/2019       Imaging Studies:     XR CHEST PORTABLE    Result Date: 1/9/2022  EXAMINATION: ONE XRAY VIEW OF THE CHEST 1/9/2022 12:35 pm COMPARISON: Chest radiograph October 23, 2021 HISTORY: ORDERING SYSTEM PROVIDED HISTORY: chest pain TECHNOLOGIST PROVIDED HISTORY: Reason for exam:->chest pain What reading provider will be dictating this exam?->CRC FINDINGS: The lungs are without acute focal process. There is no effusion or pneumothorax. The cardiomediastinal silhouette is without acute process. The osseous structures are without acute process.      No acute pulmonary process     CTA PULMONARY W CONTRAST    Result Date: 1/9/2022  EXAMINATION: CTA OF THE CHEST 1/9/2022 3:13 pm TECHNIQUE: CTA of the chest was performed after the administration of intravenous contrast.  Multiplanar reformatted images are provided for review. MIP images are provided for review. Dose modulation, iterative reconstruction, and/or weight based adjustment of the mA/kV was utilized to reduce the radiation dose to as low as reasonably achievable. COMPARISON: None. HISTORY: ORDERING SYSTEM PROVIDED HISTORY: chest pain, r/O PE TECHNOLOGIST PROVIDED HISTORY: Reason for exam:->chest pain, r/O PE Decision Support Exception - unselect if not a suspected or confirmed emergency medical condition->Emergency Medical Condition (MA) What reading provider will be dictating this exam?->CRC FINDINGS: There are several subsegmental vessels which do not appears to be well filled at the time the present study. There are streaking artifacts and motion artifacts pixeling of the images and not so ideal contrast density in some branches of the pulmonary artery circulation  bilateral, making difficult to evaluate the detail. Some segmental and subsegmental vessels are not well opacified which could be an indication for a more subacute pulmonary embolus. For these reasons consider repeat examination as an a 2nd attempt to better investigate the pulmonary artery circulation bilaterally. The main pulmonary artery measures 3.3 cm. The ascending aorta measures 3.1 cm. The no conspicuous aneurysm formation or dissection in the thoracic aorta. Calcifications are seen in the coronary arteries. Heart is normal size. The inner diameter for the left ventricle is 3.6 cm for the right ventricular is 3 cm. The interventricular septum measures 11 mm. The There is no pericardial effusion. No mediastinal masses or adenopathy seen. There is no hilar adenopathy. There is a vague ill-defined confluent density in the more upper posterior aspect of the left upper lobe not seen on previous study of a June 2014. The it covers an area of approximately 11 by 7 mm. It appears to be associated with component of peripheral subsegmental atelectasis.   Recommended a short-term follow-up study in 3 months time interval or correlation with PET-CT scan. There is no pleural effusions. There is no acute infiltrates or consolidations in the lung parenchyma to indicate bilateral pneumonia. Upper abdominal structures are not fully covered on this study. There is upper borderline size the liver and spleen. Adrenals not enlarged. Atherosclerotic changes are seen in the splenic artery. There are postoperative calcified oil cysts in the right breast.     1.  Due a combination of artifacts and not so ideal contrast density some of the segment subsegmental branches of the pulmonary artery circulation of both lungs, making difficult to proper evaluate for pulmonary emboli. Consider repeat examination. 2.  No aneurysm formation or dissection of thoracic aorta. 3.  No acute pulmonary process. 4.  Presence of  8 x 7 mm confluent density in the upper posterior aspect of the right upper lobe. This could be suspicious lesion not seen on previous study of a June 2014. 5.  Consider short-term follow-up study 3 months time interval or correlation with PET-CT scan. RECOMMENDATIONS: Unavailable           Resident's Assessment and Plan       Chris Devine is a 66 y.o. female with a past medical history of HTN, CAD, asthma, SERENA, hypothyroidism, breast CA s/p lumpectomy, Type 2 DM with neuropathy, depression/anxiety, Parkinson's disease, and restless leg syndrome who came in the ED with chief complaint of R-sided chest pain that started this morning.       She is currently being  managed for        Atypical chest pain 2/2 ischemia(rulling out)  · Presented with righ tsided chest pain associated with B/L arm pain and Left sided neck pain  · PMH of CAD, HTN, HLD and T2DM  · Trop trended down on adimssion ( Trop 21 then 18)  · EKG sower NSR  · Echo on 02/07/2021 showed 65-71% EF with moderate LVH  · Stress test in 2014 showed risk for stress induced ischemia  · D dimer 247; CTA neg for PE  Plan  · NPO at midnight stress test and echo  · On the basis of the test result will consult cardiology            Hypertension  · On home med amlidipine 5 mg and losartan 50mg daily  · -204S systolic and 99Y diastolic  Plan  · Continue current medication  · Possibly need to up titrate BP meds   · Will monitor standing BP considering her parkinson's for possible orthostatic before adding any new antihypertensive medication      Hyperlipidemia  · On atorvastatin 20 mg   · Lipid panel in 10/2021 WNL  · Plan  · conitnue lipitor 40 mg              Confluent density on RUL in CTA  · Had exposure to covid PNA but covid test came back negative  · has chronic cough  Plan  · Monitor for now            Type II DM  · BG 130s to 160s since admission  · HbA1C 6.4% on 10/24/2021  · On LDSS and lantus of 10 units  Plan   · continue current regiment   · POCT Glucose Q4 unit diet resumed  · Will adjust insulin on the basis of BG        Hx of Parkinsons disease  · on sinemet and doxepin       Hx of restless leg syndrome  · on ropinirole     Hx of anxiety and depression  · on lamictal, venlaxafine, buspirone      Hx of hypothyroidism        Code Status: Full Code  PT/OT evaluation: no  Disposition: home       Guerrero Pantoja MD, PGY-1  Attending physician: Dr. Cranford Hodgkins

## 2022-01-10 NOTE — PATIENT CARE CONFERENCE
[facility] Quality Flow/Interdisciplinary Rounds Progress Note        Quality Flow Rounds held on January 10, 2022    Disciplines Attending:  Bedside Nurse, ,  and Nursing Unit Leadership    Phillip Metzger was admitted on 1/9/2022 12:14 PM    Anticipated Discharge Date:  Expected Discharge Date: 01/12/22    Disposition:    Jose Score:  Jose Scale Score: 16    Readmission Risk              Risk of Unplanned Readmission:  0           Discussed patient goal for the day, patient clinical progression, and barriers to discharge.   The following Goal(s) of the Day/Commitment(s) have been identified:  Complete stress test and have no chest pain      Herman Stoll RN  January 10, 2022

## 2022-01-10 NOTE — PROCEDURES
[place] Nuclear Stress Test:    Cardiologist: Dr. Justin Sharma EKG: NSR, normal EKG    Reason for testing: Non cardiac pain    Indications for study: Chest pain    1. No chest pain  2. No new arrhythmias  3. No EKG changes suggestive of stress induced ischemia  4. Nuclear images pending    Daryn Ann MD., [facility] [place].    [facility]) Cardiology

## 2022-01-10 NOTE — PROGRESS NOTES
[facility]  Internal Medicine Residency Program  Progress Note - House Team 1    Patient:  Sabas Mcdonald 66 y.o. female MRN: [id number]     Date of Service: 1/10/2022     CC: chest pain  Overnight events: TRACY     Subjective     Patient seen and examined this AM, stable and in no acute distress. She states she had an episode of the R sided chest pain for 30 minutes earlier this morning that resolved completely with Tylenol. Still having intermittent episodes L sided neck pain and b/l underarm pain lasting several minutes before self resolving. Currently denying any active chest pain, palpitations, diaphoresis, N/V/D/C. She states that her dyspnea is still present both at rest and with exertion. Nonproductive cough also without improvement. Patient also endorses some dizziness, especially evident with standing. Additional social history reveals patient has been very anxious about an apartment neighbor recently, and that she has constantly been stressed for the past few weeks. Objective     Physical Exam:  · Vitals: BP (!) 167/79   Pulse 76   Temp 97.4 °F (36.3 °C) (Temporal)   Resp 18   Ht 5' (1.524 m)   Wt 219 lb 9.6 oz (99.6 kg)   SpO2 98%   BMI 42.89 kg/m²     · I & O - 24hr: No intake/output data recorded. · General Appearance: alert, appears stated age and cooperative  · HEENT:  Head: Normocephalic, no lesions, without obvious abnormality. · Neck: no adenopathy, no carotid bruit, no JVD, supple, symmetrical, trachea midline and thyroid not enlarged, symmetric, no tenderness/mass/nodules  · Lung: clear to auscultation bilaterally  · Heart: regular rate and rhythm, S1, S2 normal, no murmur, click, rub or gallop  · Abdomen: soft, non-tender; bowel sounds normal; no masses,  no organomegaly  · Extremities:  extremities normal, atraumatic, no cyanosis or edema  · Musculokeletal: No joint swelling, no muscle tenderness. ROM normal in all joints of extremities. · Neurologic: Mental status: Alert, oriented, thought content appropriate  Subject  Pertinent Labs & Imaging Studies   genesis  CBC with Differential:    Lab Results   Component Value Date    WBC 5.8 01/10/2022    RBC 4.24 01/10/2022    HGB 11.2 01/10/2022    HCT 37.3 01/10/2022     01/10/2022    MCV 88.0 01/10/2022    MCH 26.4 01/10/2022    MCHC 30.0 01/10/2022    RDW 14.8 01/10/2022    NRBC 0.0 03/15/2019    SEGSPCT 74 08/20/2013    LYMPHOPCT 27.3 01/10/2022    MONOPCT 5.2 01/10/2022    MYELOPCT 0.9 03/15/2019    EOSPCT 1 06/16/2017    BASOPCT 1.0 01/10/2022    MONOSABS 0.30 01/10/2022    LYMPHSABS 1.57 01/10/2022    EOSABS 0.09 01/10/2022    BASOSABS 0.06 01/10/2022     BMP:    Lab Results   Component Value Date     01/10/2022    K 3.8 01/10/2022     01/10/2022    CO2 25 01/10/2022    BUN 15 01/10/2022    LABALBU 3.6 01/09/2022    CREATININE 0.8 01/10/2022    CALCIUM 9.0 01/10/2022    GFRAA >60 01/10/2022    LABGLOM >60 01/10/2022    GLUCOSE 141 01/10/2022     Last 3 Troponin:    Lab Results   Component Value Date    TROPONINI <0.01 10/26/2020    TROPONINI <0.01 08/24/2019    TROPONINI <0.01 05/29/2019       Resident's Assessment and Plan     Anum Oglesby is a 66 y.o. female w/ PMH depression, anxiety, asthma, CAD, breast cancer s/p lumpectomy, CKD, SERENA on CPAP, T2DM with neuropathy, HTN, HLD, hypothyroidism, Parkinson disease, and restless leg syndrome who presented with CC right sided chest pain for several days.      1. Atypical/non-anginal chest pain, r/o ischemic etiology   -Intermittent R sided chest pain relieved with Tylenol, b/l arm pain and L sided neck pain   -Risk factors: Hx of CAD, HTN, HLD, T2DM   -10/2021: A1c 6.4%, LDL 60, HDL 59   -Trop 21-18-20   -EKG NSR   -2014 cath: minimal luminal irregularities in mid LAD without significant narrowing   -2014 chemical stress test: risk for stress-induced ischemia   -2021 Echo: EF 65-70% with moderate concentric LVH   -NPO since midnight, to go for repeat stress test   -repeat echo   -cardiology following, appreciate reccomendations   -continue ASA, lovenox, lipitor    2. Hx of HTN   -HTN overnight to 167/79 (1/10)   -endorsing some dizziness, especially with standing   -continue home amlodipine 5mg and losarton 50mg   -to measure standing BP dt hx of parkinson and carbidopa/levodopa     -monitor VS    3. Hx of HLD   -continue home lipitor    4. Confluent density of RUL on CTA, COVID PNA vs viral PNA vs effusion vs artifact   -CTA performed outside [facility] found to have confluent density on RUL with artifacts, unable to r/o PE   -With chronic cough and dyspnea/orthopnea at baseline   -Per patient, recently COVID +ve 2 weeks prior, with negative test a week later. Rapid test on admission -ve   -Radiology to review CTA   -consider pulmonology consult and viral panel if cardiac w/u -ve    5. T2DM   -on home lantus 10u nightly and LDSS   -POCT glucose    6. Anxiety/depression   -on home lamictal, effexor, and buspirone   -per patient, apartment neighbor significant stressor at home   -monitor    7. Parkinson Disease   -on home carbodipa/levodopa and doxepin   -monitor    8.  Restless leg syndrome   -on home ropinirole   -monitor    PT/OT evaluation: not indicated  DVT prophylaxis/ GI prophylaxis: lovenox/protonix   Disposition: continue monitoring on floor    Susana Montes, M4  Attending physician: Dr. Emmalene Najjar

## 2022-01-10 NOTE — H&P
[facility]  Internal Medicine Residency Program  History and Physical    Patient:  Roscoe Jaimes 66 y.o. female MRN: [id number]     Date of Service: 1/9/2022    Hospital Day: 1      Chief complaint: had concerns including Chest Pain (intermittent since yesterday, R side cp). History of Present Illness   The patient is a 66 y.o. female with a past medical history of HTN, CAD, asthma, SERENA, hypothyroidism, breast CA s/p lumpectomy, Type 2 DM with neuropathy, depression/anxiety, Parkinson's disease, and restless leg syndrome who came in the ED with chief complaint of R-sided chest pain that started this morning. 3 days PTA, patient has been experiencing L arm pain. She also noticed increased dyspnea on exertion and increased bilateral leg swelling especially at the end of the day. She has had orthopnea for years, usually sleeps on 2 pillows, but currently feels the need to add an additional pillow to relieve her orthopnea. She then started to have R-sided chest pain this morning which she describes as pinching associated with bilateral underarm pain that occurs intermittently lasting 3-4 mins. She also reports left neck pain, which she was not able to move at that time, stating that it was painful to move. She notes shortness of breath, nausea, headache and fatigue. She notes worsening of symptoms which prompted her to visit the ED. Of note, she states that she recently had exposure to covid, tested postive 2 weeks ago then had a negative test a week after. She arrived in the ED hypertensive with /88, HR 74, RR 18, afebrile and saturating well on room air. Labs were significant for troponin 21 -> 18 and d-dimer 247. EKG showed NSR, CXR was negative and CTA pulm showed artifacts and cannot really rule out PE and 8x7mm confluent density in the RUL.     She had an echo done in 2/2021 which showed EF 65-70% with moderate concentric LVH and cardiac cath done in 2014 showed minimal luminal irregularities in the mid LAD with no significant narrowing. Patient admitted for further evaluation and management    Past Medical History:      Diagnosis Date    Anxiety     Asthma     CAD (coronary artery disease) 1/21/2016    Cancer [facility])  breast ca 2006    Chronic kidney disease     nephrolithiasis    Depression     Diabetes mellitus ([facility] [address]     H/O mammogram     Hx MRSA infection     toe infection january 2012    Hyperlipidemia     Hypertension     Hypothyroidism     Lateral epicondylitis     SERENA on CPAP     Tubal ligation status        Past Surgical History:        Procedure Laterality Date    BREAST LUMPECTOMY      BREAST REDUCTION SURGERY      CARDIAC CATHETERIZATION  4/28/2014    Dr. Johanna Mohamud COLONOSCOPY  7/29/15    ENDOSCOPY, COLON, DIAGNOSTIC  7/19/15    GALLBLADDER SURGERY      LUMBAR LAMINECTOMY      TOE AMPUTATION      TONSILLECTOMY      UPPER GASTROINTESTINAL ENDOSCOPY         Medications Prior to Admission:    Prior to Admission medications    Medication Sig Start Date End Date Taking?  Authorizing Provider   carbidopa-levodopa (SINEMET CR)  MG per extended release tablet Take 1 tablet by mouth 3 times daily 12/27/21   ERIC Khan   rOPINIRole (REQUIP) 0.5 MG tablet Take 2 tablets by mouth 3 times daily 10/25/21   Renée Umanzor, APRN - CNP   busPIRone (BUSPAR) 5 MG tablet Take 1 tablet by mouth 3 times daily 10/25/21   Theta Amabile, APRN - CNP   Multiple Vitamin (MULTIVITAMIN) TABS tablet Take 1 tablet by mouth daily 10/26/21   Theta Amabile, APRN - CNP   amLODIPine (NORVASC) 5 MG tablet Take 1 tablet by mouth daily 10/25/21   Theta Amabile, APRN - CNP   Cholecalciferol (VITAMIN D) 25 MCG TABS Take 1 tablet by mouth daily 10/26/21   Theta Amabile, APRN - CNP   insulin glargine (LANTUS) 100 UNIT/ML injection vial Inject 10 Units into the skin nightly 10/25/21   Jenniffer Brandt MD   doxepin (SINEQUAN) 10 MG capsule Take 10 mg by mouth nightly    Historical Provider, MD   venlafaxine (EFFEXOR XR) 150 MG extended release capsule Take 150 mg by mouth daily    Historical Provider, MD   losartan (COZAAR) 50 MG tablet Take 50 mg by mouth daily    Historical Provider, MD   omeprazole (PRILOSEC) 40 MG delayed release capsule Take 40 mg by mouth daily     Historical Provider, MD   blood glucose test strips (ACCU-CHEK RIGOBERTO PLUS) strip 1 each by In Vitro route 2 times daily Indications: DISP ACCU-CHEK As needed. Historical Provider, MD   lamoTRIgine (LAMICTAL) 200 MG tablet Take 200 mg by mouth daily  10/18/19   Historical Provider, MD   atorvastatin (LIPITOR) 20 MG tablet Take 1 tablet by mouth daily 10/21/19   Shweta Burnett MD   insulin aspart (NOVOLOG FLEXPEN) 100 UNIT/ML injection pen INJECT 0-10 UNITS INTO THE SKIN THREE TIMES DAILY(BEFORE MEALS) SLIDING SCALE (MAX DAILY 30 UNITS)  Patient taking differently: Indications: med. approved 6/1/19-7/29/20 INJECT 0-10 UNITS INTO THE SKIN THREE TIMES DAILY(BEFORE MEALS) SLIDING SCALE (MAX DAILY 30 UNITS) 7/29/19   Shweta Burnett MD   aspirin 81 MG tablet Take 81 mg by mouth nightly    Historical Provider, MD       Allergies:  Ciprofloxacin and Codeine    Social History:   TOBACCO:   reports that she has never smoked. She has never used smokeless tobacco.  ETOH:   reports no history of alcohol use. Family History:       Problem Relation Age of Onset    Cancer Mother         Lung Ca    Cancer Father         lung Ca    Diabetes Sister     Heart Disease Sister     Seizures Son     Other Brother         sepsis       REVIEW OF SYSTEMS:    · Constitutional: No fever, no chills, no change in weight; good appetite  · HEENT: No blurred vision, no ear problems, no sore throat, no rhinorrhea.   · Respiratory: + cough, no sputum production, no pleuritic chest pain, + shortness of breath  · Cardiology: No angina, + dyspnea on exertion, no paroxysmal nocturnal dyspnea, + orthopnea, no palpitation, + leg swelling. · Gastroenterology: No dysphagia, no reflux; no abdominal pain, no nausea or vomiting; no constipation or diarrhea.  No hematochezia   · Genitourinary: No dysuria, no frequency, hesitancy; no hematuria  · Musculoskeletal: no joint pain, no myalgia, no change in range of movement  · Neurology: no focal weakness in extremities, no slurred speech, no double vision, no tingling or numbness sensation  · Endocrinology: no temperature intolerance, no polyphagia, polydipsia or polyuria  · Hematology: no increased bleeding, no bruising, no lymphadenopathy  · Skin: no skin changes noticed by patient  · Psychology: no depressed mood, no suicidal ideation    Physical Exam   · Vitals: BP (!) 169/91   Pulse 83   Temp 98.5 °F (36.9 °C)   Resp 20   Ht 5' 4\" (1.626 m)   Wt 200 lb (90.7 kg)   SpO2 98%   BMI 34.33 kg/m²     · General Appearance: alert and oriented to person, place and time, well-developed and well-nourished, in no acute distress  · Skin: warm and dry, no rash or erythema  · Head: normocephalic and atraumatic  · Pulmonary/Chest: clear to auscultation bilaterally- no wheezes, rales or rhonchi, normal air movement, no respiratory distress  · Cardiovascular: normal rate, normal S1 and S2, no gallops, intact distal pulses and no carotid bruits  · Abdomen: soft, non-tender, non-distended, normal bowel sounds, no masses or organomegaly  · Extremities: no cyanosis, clubbing or edema  · Musculoskeletal: normal range of motion, no joint swelling, deformity or tenderness  · Neurologic: speech normal and sensation to light touch intact   Labs and Imaging Studies   Basic Labs  Recent Labs     01/09/22  1230      K 4.6      CO2 26   BUN 16   CREATININE 0.9   GLUCOSE 198*   CALCIUM 8.8       Recent Labs     01/09/22  1230   WBC 6.0   RBC 4.45   HGB 11.7   HCT 38.8   MCV 87.2   MCH 26.3   MCHC 30.2*   RDW 14.8      MPV 9.2         Imaging Studies:  CTA PULMONARY W CONTRAST Final Result   1. Due a combination of artifacts and not so ideal contrast density some of   the segment subsegmental branches of the pulmonary artery circulation of both   lungs, making difficult to proper evaluate for pulmonary emboli. Consider   repeat examination. 2.  No aneurysm formation or dissection of thoracic aorta. 3.  No acute pulmonary process. 4.  Presence of  8 x 7 mm confluent density in the upper posterior aspect of   the right upper lobe. This could be suspicious lesion not seen on previous   study of a June 2014. 5.  Consider short-term follow-up study 3 months time interval or correlation   with PET-CT scan. RECOMMENDATIONS:   Unavailable         XR CHEST PORTABLE   Final Result   No acute pulmonary process              EKG: normal sinus rhythm, unchanged from previous tracings. Resident's Assessment and Plan     Assessment and Plan:    1. Atypical/non-anginal chest pain; r/o ischemic cause  - pt came in with R-sided chest pain associated with bilateral arm pain and L sided neck pain  - has hx of CAD, HTN, HLD, T2DM  - trop 21 -> 18  - EKG showed NSR  - Echo in 2021 showed EF65-70% with moderate concentric LVH  - cardiac cath in 2014 showed minimal luminal irregularities in the mid LAD without any significant narrowing  - stress test done in 2014 showed risk for stress-induced ischemia  Plan:  - repeat stress test and echo  - NPO at midnight  - Cardio consult    2. Hx of HTN  - home med: amlodipine 5mg and losartan 50mg daily  Plan:  - continue home meds  - Monitor BP    3. Hx of HLD  - on atorvastatin 20mg daily  - lipid panel in 10/2021 WNL  Plan:  - Continue atorvastatin    4. Confluent density on RUL, concerns for COVID vs viral pneumonia vs pleural effusion vs artifact  - had exposure to covid recently  - tested positive 2 weeks ago  - has chronic cough  Plan:  - Monitor for now    5.  Hx of T2DM  - A1c in 10/2021: 6.4%  - on lantus 10u nightly and LDSSI at home  Plan:  - Continue current regimen  - hypoglycemia protocol in place  - glucose checks AC/HS    6. Hx of Parkinsons disease  - on sinemet and doxepin    7. Hx of restless leg syndrome  - on ropinirole    8. Hx of anxiety and depression  - on lamictal, venlaxafine, buspirone     9.  Hx of hypothyroidism      PT/OT evaluation: not indicated at this time  DVT prophylaxis/ GI prophylaxis: lovenox/protonix  Disposition: admit to house team 1    Juancho Mcdaniels MD, PGY-1  Attending physician: Dr. Girish Barroso  Discussed with Dr. Jim Pham

## 2022-01-10 NOTE — PLAN OF CARE
Patient standing blood pressure taken 3 and 5 minutes after standing are 173/105 BPM 92, and 186/89 BPM 87, respectively.

## 2022-01-11 VITALS
TEMPERATURE: 99.1 F | RESPIRATION RATE: 16 BRPM | HEIGHT: 60 IN | SYSTOLIC BLOOD PRESSURE: 147 MMHG | WEIGHT: 219.6 LBS | BODY MASS INDEX: 43.11 KG/M2 | OXYGEN SATURATION: 92 % | DIASTOLIC BLOOD PRESSURE: 79 MMHG | HEART RATE: 78 BPM

## 2022-01-11 LAB
ABO/RH: NORMAL
ANION GAP SERPL CALCULATED.3IONS-SCNC: 11 MMOL/L (ref 7–16)
ANTIBODY SCREEN: NORMAL
BUN BLDV-MCNC: 11 MG/DL (ref 6–23)
CALCIUM SERPL-MCNC: 9.3 MG/DL (ref 8.6–10.2)
CHLORIDE BLD-SCNC: 103 MMOL/L (ref 98–107)
CO2: 26 MMOL/L (ref 22–29)
CREAT SERPL-MCNC: 0.7 MG/DL (ref 0.5–1)
GFR AFRICAN AMERICAN: >60
GFR NON-AFRICAN AMERICAN: >60 ML/MIN/1.73
GLUCOSE BLD-MCNC: 141 MG/DL (ref 74–99)
LV EF: 70 %
LVEF MODALITY: NORMAL
METER GLUCOSE: 130 MG/DL (ref 74–99)
METER GLUCOSE: 190 MG/DL (ref 74–99)
POTASSIUM REFLEX MAGNESIUM: 3.6 MMOL/L (ref 3.5–5)
SODIUM BLD-SCNC: 140 MMOL/L (ref 132–146)

## 2022-01-11 PROCEDURE — 82962 GLUCOSE BLOOD TEST: CPT

## 2022-01-11 PROCEDURE — 93306 TTE W/DOPPLER COMPLETE: CPT

## 2022-01-11 PROCEDURE — 86900 BLOOD TYPING SEROLOGIC ABO: CPT

## 2022-01-11 PROCEDURE — 2580000003 HC RX 258: Performed by: INTERNAL MEDICINE

## 2022-01-11 PROCEDURE — 6370000000 HC RX 637 (ALT 250 FOR IP): Performed by: INTERNAL MEDICINE

## 2022-01-11 PROCEDURE — 36415 COLL VENOUS BLD VENIPUNCTURE: CPT

## 2022-01-11 PROCEDURE — 2709999900 HC NON-CHARGEABLE SUPPLY

## 2022-01-11 PROCEDURE — 80048 BASIC METABOLIC PNL TOTAL CA: CPT

## 2022-01-11 PROCEDURE — 93458 L HRT ARTERY/VENTRICLE ANGIO: CPT

## 2022-01-11 PROCEDURE — G0378 HOSPITAL OBSERVATION PER HR: HCPCS

## 2022-01-11 PROCEDURE — C1894 INTRO/SHEATH, NON-LASER: HCPCS

## 2022-01-11 PROCEDURE — 2500000003 HC RX 250 WO HCPCS

## 2022-01-11 PROCEDURE — 6370000000 HC RX 637 (ALT 250 FOR IP)

## 2022-01-11 PROCEDURE — 6370000000 HC RX 637 (ALT 250 FOR IP): Performed by: STUDENT IN AN ORGANIZED HEALTH CARE EDUCATION/TRAINING PROGRAM

## 2022-01-11 PROCEDURE — 6360000002 HC RX W HCPCS

## 2022-01-11 PROCEDURE — APPSS180 APP SPLIT SHARED TIME > 60 MINUTES: Performed by: NURSE PRACTITIONER

## 2022-01-11 PROCEDURE — 86901 BLOOD TYPING SEROLOGIC RH(D): CPT

## 2022-01-11 PROCEDURE — C1769 GUIDE WIRE: HCPCS

## 2022-01-11 PROCEDURE — 86850 RBC ANTIBODY SCREEN: CPT

## 2022-01-11 PROCEDURE — 99205 OFFICE O/P NEW HI 60 MIN: CPT | Performed by: INTERNAL MEDICINE

## 2022-01-11 RX ORDER — METOPROLOL SUCCINATE 25 MG/1
25 TABLET, EXTENDED RELEASE ORAL DAILY
Qty: 30 TABLET | Refills: 0 | Status: ON HOLD | OUTPATIENT
Start: 2022-01-12 | End: 2022-05-17 | Stop reason: SDUPTHER

## 2022-01-11 RX ORDER — SODIUM CHLORIDE 9 MG/ML
INJECTION, SOLUTION INTRAVENOUS CONTINUOUS
Status: DISCONTINUED | OUTPATIENT
Start: 2022-01-11 | End: 2022-01-11 | Stop reason: HOSPADM

## 2022-01-11 RX ORDER — NITROGLYCERIN 0.4 MG/1
TABLET SUBLINGUAL
Qty: 25 TABLET | Refills: 0 | Status: ON HOLD | OUTPATIENT
Start: 2022-01-11 | End: 2022-07-07

## 2022-01-11 RX ORDER — SODIUM CHLORIDE 9 MG/ML
25 INJECTION, SOLUTION INTRAVENOUS PRN
Status: DISCONTINUED | OUTPATIENT
Start: 2022-01-11 | End: 2022-01-11 | Stop reason: HOSPADM

## 2022-01-11 RX ORDER — INSULIN GLARGINE 100 [IU]/ML
10 INJECTION, SOLUTION SUBCUTANEOUS NIGHTLY
Qty: 10 ML | Refills: 3 | Status: ON HOLD | OUTPATIENT
Start: 2022-01-11 | End: 2022-05-17 | Stop reason: SDUPTHER

## 2022-01-11 RX ORDER — AMLODIPINE BESYLATE 10 MG/1
10 TABLET ORAL DAILY
Qty: 30 TABLET | Refills: 0 | Status: ON HOLD | OUTPATIENT
Start: 2022-01-12 | End: 2022-04-19 | Stop reason: HOSPADM

## 2022-01-11 RX ORDER — BUSPIRONE HYDROCHLORIDE 5 MG/1
5 TABLET ORAL 3 TIMES DAILY
Qty: 90 TABLET | Refills: 0 | Status: ON HOLD | OUTPATIENT
Start: 2022-01-11 | End: 2022-07-07

## 2022-01-11 RX ORDER — ISOSORBIDE MONONITRATE 30 MG/1
30 TABLET, EXTENDED RELEASE ORAL DAILY
Status: DISCONTINUED | OUTPATIENT
Start: 2022-01-11 | End: 2022-01-11 | Stop reason: HOSPADM

## 2022-01-11 RX ORDER — NITROGLYCERIN 0.4 MG/1
0.4 TABLET SUBLINGUAL EVERY 5 MIN PRN
Status: DISCONTINUED | OUTPATIENT
Start: 2022-01-11 | End: 2022-01-11 | Stop reason: HOSPADM

## 2022-01-11 RX ORDER — METOPROLOL SUCCINATE 25 MG/1
25 TABLET, EXTENDED RELEASE ORAL DAILY
Status: DISCONTINUED | OUTPATIENT
Start: 2022-01-11 | End: 2022-01-11 | Stop reason: HOSPADM

## 2022-01-11 RX ORDER — ISOSORBIDE MONONITRATE 30 MG/1
30 TABLET, EXTENDED RELEASE ORAL DAILY
Qty: 30 TABLET | Refills: 0 | Status: ON HOLD | OUTPATIENT
Start: 2022-01-11 | End: 2022-05-17 | Stop reason: HOSPADM

## 2022-01-11 RX ORDER — SODIUM CHLORIDE 0.9 % (FLUSH) 0.9 %
5-40 SYRINGE (ML) INJECTION PRN
Status: DISCONTINUED | OUTPATIENT
Start: 2022-01-11 | End: 2022-01-11 | Stop reason: HOSPADM

## 2022-01-11 RX ORDER — SODIUM CHLORIDE 0.9 % (FLUSH) 0.9 %
5-40 SYRINGE (ML) INJECTION EVERY 12 HOURS SCHEDULED
Status: DISCONTINUED | OUTPATIENT
Start: 2022-01-11 | End: 2022-01-11 | Stop reason: HOSPADM

## 2022-01-11 RX ADMIN — AMLODIPINE BESYLATE 10 MG: 10 TABLET ORAL at 08:58

## 2022-01-11 RX ADMIN — METOPROLOL SUCCINATE 25 MG: 25 TABLET, EXTENDED RELEASE ORAL at 13:12

## 2022-01-11 RX ADMIN — Medication 1000 UNITS: at 08:58

## 2022-01-11 RX ADMIN — INSULIN LISPRO 1 UNITS: 100 INJECTION, SOLUTION INTRAVENOUS; SUBCUTANEOUS at 13:11

## 2022-01-11 RX ADMIN — LAMOTRIGINE 200 MG: 100 TABLET ORAL at 08:59

## 2022-01-11 RX ADMIN — CARBIDOPA AND LEVODOPA 2 TABLET: 25; 100 TABLET, EXTENDED RELEASE ORAL at 13:13

## 2022-01-11 RX ADMIN — ISOSORBIDE MONONITRATE 30 MG: 30 TABLET, EXTENDED RELEASE ORAL at 13:12

## 2022-01-11 RX ADMIN — CARBIDOPA AND LEVODOPA 2 TABLET: 25; 100 TABLET, EXTENDED RELEASE ORAL at 08:59

## 2022-01-11 RX ADMIN — ROPINIROLE HYDROCHLORIDE 1 MG: 1 TABLET, FILM COATED ORAL at 13:15

## 2022-01-11 RX ADMIN — PANTOPRAZOLE SODIUM 40 MG: 40 TABLET, DELAYED RELEASE ORAL at 06:05

## 2022-01-11 RX ADMIN — BUSPIRONE HYDROCHLORIDE 5 MG: 5 TABLET ORAL at 13:12

## 2022-01-11 RX ADMIN — Medication 10 ML: at 08:59

## 2022-01-11 RX ADMIN — ROPINIROLE HYDROCHLORIDE 1 MG: 1 TABLET, FILM COATED ORAL at 08:59

## 2022-01-11 RX ADMIN — Medication 1 TABLET: at 08:59

## 2022-01-11 RX ADMIN — LOSARTAN POTASSIUM 50 MG: 25 TABLET, FILM COATED ORAL at 08:59

## 2022-01-11 RX ADMIN — BUSPIRONE HYDROCHLORIDE 5 MG: 5 TABLET ORAL at 08:59

## 2022-01-11 RX ADMIN — VENLAFAXINE HYDROCHLORIDE 150 MG: 150 CAPSULE, EXTENDED RELEASE ORAL at 08:59

## 2022-01-11 RX ADMIN — ATORVASTATIN CALCIUM 20 MG: 20 TABLET, FILM COATED ORAL at 08:59

## 2022-01-11 ASSESSMENT — PAIN SCALES - GENERAL
PAINLEVEL_OUTOF10: 0

## 2022-01-11 NOTE — PROGRESS NOTES
CLINICAL PHARMACY NOTE: MEDS TO BEDS    Total # of Prescriptions Filled: 6   The following medications were delivered to the patient:  · Amlodipine 10  · Nitro 0.4  · Metoprolol ER 25  · Lantus  · Buspar 5  · Isosorbide ER 30    Additional Documentation:   To patient

## 2022-01-11 NOTE — CONSULTS
Patient seen and examined. Chart, labs, imaging studies, EKG and rhythm strips reviewed. Full consult to follow. We were asked to see the patient for an abnormal stress test.     IMPRESSION:  1. Chest pain, doubt cardiac  2. Abnormal stress test, reported as high risk, suspect false positive secondary to attentuation artifacts   3. No significant CAD on cath in 2014 (for abnormal stress test)  4. Echo 02/2021 EF 65-70% with no gross valvular abnormalities (technically limited study)  5. RUL density on CTA of the chest 01/10/2022  6. HTN, not adequately controlled  7. DM, insulin requiring. Diabetic peripheral neuropathy  8. HLD  9. Morbid obesity   10. Asthma  11. SERENA, noncompliant with Cpap  12. Anxiety and Depression  13. Parkinson's disease  14. H/o right breast cancer s/p lumpectomy  15. Remote Hx of stomach ulcers with negative EGD and colonoscopy in 2013    PLAN:   1. Cardiac catheterization (AUC: Indication 17 Score 9) +/- PCI. Risks, benefits, and alternative therapies to the procedure explained. She understood and consent to proceed. 2. Further recommendations to follow.      Electronically signed by Lionel Encarnacion MD on 1/11/2022 at 9:16 AM

## 2022-01-11 NOTE — PATIENT CARE CONFERENCE
[facility] Quality Flow/Interdisciplinary Rounds Progress Note        Quality Flow Rounds held on January 11, 2022    Disciplines Attending:  Bedside Nurse, ,  and Nursing Unit Leadership    Esteban Bell was admitted on 1/9/2022 12:14 PM    Anticipated Discharge Date:  Expected Discharge Date: 01/12/22    Disposition:    Jose Score:  Jose Scale Score: 19    Readmission Risk              Risk of Unplanned Readmission:  0           Discussed patient goal for the day, patient clinical progression, and barriers to discharge.   The following Goal(s) of the Day/Commitment(s) have been identified:  have cardiac cath today and inform patient of findings      Diandra Gunderson RN  January 11, 2022

## 2022-01-11 NOTE — DISCHARGE SUMMARY
[address]  Discharge Summary      Patient:  Allyson Reyna 66 y.o. (: 1950) female   MRN: [id number]       Date of Service: 2022    Allergy: Ciprofloxacin and Codeine    Admit date: 2022    Discharge date: 2022    Admitting Physician: Earnest Valente MD     Discharge Physician: Dr. Rubia Jones MD    PCP: Pradeep Lundberg MD    Admission Diagnoses:   1. Atypical chest pain, r/o ischemic cause  2. H/O HTN  3. H/O HLD  4. Confluent density on Rt. Upper lobe (concern for viral pneumonia)  5. IDDM with neuropathy  6. H/O Parkinsons  7. H/O Restless leg  8. H/O Anxiety and Depression  9. H/O Hypothyroidism  10. H/O SERENA, CAD      Discharge Diagnoses:   1. Atypical Chest pain, Ischemia ruled out - Resolved  2. Diarrhea 2/2 likely non-infectious - Resolved  3. Confluent density on Rt. Upper lobe, COVID ruled out  4. H/O HTN  5. H/O HLD  6. IDDM  7. H/O Parkinsons  8. H/O Restless leg  9. H/O Anxiety and Depression  10. H/O Hypothyroidism      Admission Condition: stable    Discharged Condition: good    Hospital Course: Allyson Reyna, a 66 y.o. female, was admitted on 2022 with complaints of right sided chest pain X 3 days. Symptoms initially started with intermittent L arm pain, increased dyspnea on exertion, and increased bilateral leg swelling. The R sided chest pain began the morning prior to arrival, described as pinching in quality, nonradiating, and intermittently lasting 3-4 minutes with self-resolution. Additionally, she endorsed L neck pain, nausea, headache, fatigue, and worsening of her baseline orthopnea. States she had a positive covid test 2 weeks prior, with a negative test a week later. In the ED vitals significant for /88, HR 74, RR 18, afebrile, and normal saturations on RA. Labs and CXR largely insignificant; troponin trended 21-18-20, D-dimer 247. EKG showed NSR.  CTA pulm demonstrated a confluent density in the RUL and artifacts that could not definitively r/o PE. Home medications for HTN, HLD, T2DM, Parkinson, restless leg syndrome, and anxiety/depression were continued.      Patient was taken for chemical stress test 1/10/22 which demonstrated large sized, fixed defect in inferior and inferiolateral wall suggestive of prior infarct without any reversibility, as well as a small sized perfusion defect involving mid to distal anterior wall suggestive of ischemia. Patient also with consistently high blood pressure readings to 167/79. Given history of Parkinson disease and use of carbidopa/levodopa, orthostatic changes were ruled out before increasing home amlodipine to 10mg.     Overnight into 1/11/22, patient had several episodes of diarrhea responsive to loperamide and dicyclomine. Cardiology was consulted for the abnormal stress test, and she was taken for catheterization that found mild to moderate CAD with 50% stenosis of LAD and circumflex, with recommendations for aggressive medical management. Consults: cardiology    Significant Diagnostic Studies:     Stress test: 1/10/2022  ECG during the infusion did not change. 2.  The myocardial perfusion imaging was abnormal.   3.  The abnormality was a large sized, moderate fixed defect involving   the inferior and inferolateral wall suggestive prior infarct without   any reversibility. There was also a small sized mild perfusion defect   involving the mid to distal anterior wall suggestive ischemia. 4.  Overall left ventricular systolic function was normal without   regional wall motion abnormalities. 5.  No transient ischemic dilatation. 6.  High risk general pharmacologic stress test.       Heart cath: 1/11/2022  1. Coronary artery disease:  A. Left main. No significant disease. B.  LAD. Heavily calcified proximal and mid vessel with 50% mid vessel  stenosis. 60% proximal stenosis of a moderate size first diagonal  branch. C.  LCX.   50% ostial narrowing of the AV groove branch in the mid vessel  which is a bifurcation lesion with a large marginal branch which itself  did not appear to have any significant disease. The AV groove branch of  the left circumflex continues to provide a posterior descending artery  branch and the posterolateral branch (codominant vessel). D.  RCA. Codominant vessel with no significant angiographic disease. 2.  Normal left ventricular size with hyperdynamic left ventricular  systolic function with an estimated ejection fraction of 75%. 3.  Systemic hypertension. 4.  Elevated left ventricular end-diastolic pressure. Echocardiogram: 1/11/2022  Left ventricle is normal in size. Moderate concentric left ventricular hypertrophy. No regional wall motion abnormalities seen. Ejection fraction is visually estimated at 70+/-5%. There is doppler evidence of stage I diastolic dysfunction. Mildly dilated right ventricle. Right ventricle global systolic function is normal ( TAPSE = 1.8 ). Normal size atria. No significant valvular abnormalities noted.     Medication in Hospital:   Scheduled Meds: [START ON 1/12/2022] enoxaparin, 40 mg, Daily  sodium chloride flush, 5-40 mL, 2 times per day  metoprolol succinate, 25 mg, Daily  isosorbide mononitrate, 30 mg, Daily  amLODIPine, 10 mg, Daily  sodium chloride flush, 5-40 mL, 2 times per day  aspirin, 81 mg, Nightly  atorvastatin, 20 mg, Daily  busPIRone, 5 mg, TID  vitamin D, 1,000 Units, Daily  doxepin, 10 mg, Nightly  insulin glargine, 10 Units, Nightly  insulin lispro, 0-6 Units, TID WC  insulin lispro, 0-3 Units, Nightly  lamoTRIgine, 200 mg, Daily  losartan, 50 mg, Daily  multivitamin, 1 tablet, Daily  pantoprazole, 40 mg, QAM AC  rOPINIRole, 1 mg, TID  venlafaxine, 150 mg, Daily  carbidopa-levodopa, 2 tablet, TID       PRN: sodium chloride flush, sodium chloride, nitroGLYCERIN, hydrALAZINE, loperamide, dicyclomine, sodium chloride flush, sodium chloride, ondansetron **OR** ondansetron, polyethylene glycol, acetaminophen **OR** acetaminophen, perflutren lipid microspheres, glucose, dextrose, glucagon (rDNA), dextrose    Infusions: sodium chloride    sodium chloride Last Rate: 75 mL/hr at 01/11/22 1301    sodium chloride    dextrose    Discharge Exam:    BP (!) 144/78   Pulse 70   Temp 97.3 °F (36.3 °C) (Temporal)   Resp 18   Ht 5' (1.524 m)   Wt 219 lb 9.6 oz (99.6 kg)   SpO2 95%   BMI 42.89 kg/m²     Physical Exam  Vitals and nursing note reviewed. Constitutional:       General: She is not in acute distress. Appearance: She is obese. She is not ill-appearing, toxic-appearing or diaphoretic. HENT:      Head: Normocephalic and atraumatic. Right Ear: External ear normal.      Left Ear: External ear normal.      Nose: Nose normal.      Mouth/Throat:      Mouth: Mucous membranes are moist.      Pharynx: Oropharynx is clear. Eyes:      General: No scleral icterus. Right eye: No discharge. Left eye: No discharge. Conjunctiva/sclera: Conjunctivae normal.   Cardiovascular:      Rate and Rhythm: Normal rate and regular rhythm. Pulses: Normal pulses. Heart sounds: Normal heart sounds. No murmur heard. No friction rub. No gallop. Comments: Splitting of 2nd heart sound  Pulmonary:      Effort: Pulmonary effort is normal. No respiratory distress. Breath sounds: Normal breath sounds. No stridor. No wheezing, rhonchi or rales. Abdominal:      General: Bowel sounds are normal. There is no distension. Palpations: Abdomen is soft. There is no mass. Tenderness: There is no abdominal tenderness. There is no guarding or rebound. Hernia: No hernia is present. Musculoskeletal:      Right lower leg: No edema. Left lower leg: No edema. Skin:     General: Skin is warm. Capillary Refill: Capillary refill takes less than 2 seconds. Neurological:      Mental Status: She is alert. Mental status is at baseline. Patient Instructions:   1. Be compliant with medications  2. We have started you on imdur 30mg - please take it once a day   3. We have started you on metoprolol 25mg once a day  4. You have an as needed nitroglycerin 0.4mg tablet to put under your tongue in case you develop chest pain - take one and call 911.   5. We have increased your amlodipine to 10mg once a day from 5mg once a day. 6. We have cut down your Lantus to 10U daily. DischargeMedications:     Medication List      START taking these medications    isosorbide mononitrate 30 MG extended release tablet  Commonly known as: IMDUR  Take 1 tablet by mouth daily     metoprolol succinate 25 MG extended release tablet  Commonly known as: TOPROL XL  Take 1 tablet by mouth daily  Start taking on: January 12, 2022     nitroGLYCERIN 0.4 MG SL tablet  Commonly known as: NITROSTAT  up to max of 3 total doses. If no relief after 1 dose, call 911. CHANGE how you take these medications    amLODIPine 10 MG tablet  Commonly known as: NORVASC  Take 1 tablet by mouth daily  Start taking on: January 12, 2022  What changed:   · medication strength  · how much to take     insulin glargine 100 UNIT/ML injection vial  Commonly known as: LANTUS  Inject 10 Units into the skin nightly  What changed: additional instructions     rOPINIRole 1 MG tablet  Commonly known as: REQUIP  What changed: Another medication with the same name was removed. Continue taking this medication, and follow the directions you see here.         CONTINUE taking these medications    Accu-Chek Mary Plus strip  Generic drug: blood glucose test strips     ALPRAZolam 0.5 MG tablet  Commonly known as: XANAX     aspirin 81 MG tablet     atorvastatin 20 MG tablet  Commonly known as: LIPITOR  Take 1 tablet by mouth daily     busPIRone 5 MG tablet  Commonly known as: BUSPAR  Take 1 tablet by mouth 3 times daily     carbidopa-levodopa  MG per extended release tablet  Commonly known as: Sinemet CR  Take 1 tablet by mouth 3 times daily     doxepin 10 MG capsule  Commonly known as: SINEQUAN     insulin aspart 100 UNIT/ML injection pen  Commonly known as: NovoLOG FlexPen  INJECT 0-10 UNITS INTO THE SKIN THREE TIMES DAILY(BEFORE MEALS) SLIDING SCALE (MAX DAILY 30 UNITS)     lamoTRIgine 200 MG tablet  Commonly known as: LAMICTAL     losartan 50 MG tablet  Commonly known as: COZAAR     meclizine 25 MG tablet  Commonly known as: ANTIVERT     montelukast 10 MG tablet  Commonly known as: SINGULAIR     multivitamin Tabs tablet  Take 1 tablet by mouth daily     omeprazole 40 MG delayed release capsule  Commonly known as: PRILOSEC     venlafaxine 150 MG extended release capsule  Commonly known as: EFFEXOR XR     Vitamin D3 25 MCG Tabs  Take 1 tablet by mouth daily           Where to Get Your Medications      These medications were sent to [facility] [address] [facility] [address]    Phone: [phone]   · amLODIPine 10 MG tablet  · busPIRone 5 MG tablet  · insulin glargine 100 UNIT/ML injection vial  · isosorbide mononitrate 30 MG extended release tablet  · metoprolol succinate 25 MG extended release tablet  · nitroGLYCERIN 0.4 MG SL tablet         Activity: activity as tolerated    Diet: cardiac diet    Wound Care: none needed    Discharge Destination: Home    Follow-Up: With Rayray Barrientos MD for post-hospital follow-up within 7 days of discharge      Gayatri Mantilla M.D., PGY-2    Internal medicine resident    Attending Physician: Dr. Joyce Mac MD    1/11/2022 3:10 PM

## 2022-01-11 NOTE — OP NOTE
Operative Note      Patient: Amparo Cruz  YOB: 1950  MRN: [id number]    Date of Procedure: 1/11/22    Indication:  1. CP. Abnormal stress test  2. AUC score: 9  3. AUC indication: 17    Procedure: Left Heart Catheterization, coronary angiography, left ventriculography    Anesthesia: Versed, Fentanyl  Time sedation was administered: 10:36. I was present in the room when sedation was administered. Procedure end time: 11:08  Time spent with face to face monitoring of moderate sedation: 44 minutes    LHC performed via right radial approach using a 6 F sheath. 2.5mg of diluted Verapamil and 200mcg of nitroglycerine administered through the sheath. 5000 U heparin administered IV. Findings:  Left main: 0%  stenosis  LAD: 50 %  stenosis  Circumflex: 50 %   stenosis  RCA: Co dominant. 0 %  stenosis  LV angio: 75-80%  ejection fraction    Hemodynamics:  LV: 164 mmHg. EDP: 18 No gradient across AV. Ao: 142/80 ( 110 ). Sheath removed and TR band applied. There was good hemostasis achieved and the distal pulses were intact.      Complication: None   Estimated blood loss: 20 cc  Contrast use: 95 cc    Post op diagnosis:  Mild to moderate CAD    PLAN:  Medical therapy / risk factor modification          Electronically signed by Anna Gates MD on 1/11/2022 at 11:29 AM

## 2022-01-11 NOTE — CARE COORDINATION
SOCIAL WORK/CASEMANAGEMENT TRANSITION OF CARE [id number] Miesha De La Rosa, [facility], [phone]): discharge home today per resident. Cardio signed off. rn said to have pt picked up around 5 p.m. due to being on bed rest after cath. I called [facility] provide a ride and [facility] between 5-6 p.m. and will send a text message to the pt on her cell phone 15 minutes before arriving.  ESTHER Diaz  1/11/2022

## 2022-01-11 NOTE — PROCEDURES
[address]                  [address] [place],  [facility]                            CARDIAC CATHETERIZATION    PATIENT NAME: Sincere Mendoza                  :        1950  MED REC NO:   [id number]                            ROOM:       1141  ACCOUNT NO:   [de-identified]                           ADMIT DATE: 2022  PROVIDER:     Gurvinder Smiley MD    DATE OF PROCEDURE:  2022    PROCEDURES PERFORMED:  Left heart catheterization, selective coronary  angiography and left ventriculography. ROUTE:  The procedure was done through right radial approach using  ultrasound guidance. SEDATION:  The patient received intravenous Versed and intravenous  fentanyl for sedation. INDICATIONS:  Chest pain. Abnormal stress test (reported as high risk). PRESSURES:  Aorta 142/80 with a mean of 110. Left ventricular systolic pressure 589, left ventricular end-diastolic  pressure 18. There was no significant gradient across the aortic valve. CORONARY ANGIOGRAPHY:  Left main:  The left main artery is a short vessel, practically giving  separate ostia to the LAD and the left circumflex, and did not appear to  have any significant angiographic disease. LAD:  The left anterior descending artery is heavily calcified in its  proximal and middle segment. There is around 50% LAD disease after the  diagonal branch and two septal  branches. The rest of the LAD  did not appear to have any significant angiographic disease. The first  diagonal branch is a moderate-size vessel which had around 60% disease  in its proximal segment. LCX:  The left circumflex is a large codominant vessel. It gives rise  to a large marginal branch which did not appear to have any significant  angiographic disease.   Just after the large marginal branch in a true  bifurcation fashion, the AV groove branch of the left circumflex has 50%  ostial narrowing with no further disease distally before it gives rise  to the posterior descending artery branch and the posterolateral branch. RCA:  The right coronary artery is a large codominant vessel which did  not appear to have any significant angiographic disease. LEFT VENTRICULOGRAPHY:  The left ventricle is normal in size and is  hyperdynamic in contractility with an estimated ejection fraction of  75%. There was no mitral regurgitation. The right radial arterial sheath was removed at the end of the procedure  and a TR band was applied with adequate hemostasis and with preservation  of pulse. The patient tolerated the procedure well and left the cardiac  catheterization laboratory in stable condition. CONCLUSIONS:  1. Coronary artery disease:  A. Left main. No significant disease. B.  LAD. Heavily calcified proximal and mid vessel with 50% mid vessel  stenosis. 60% proximal stenosis of a moderate size first diagonal  branch. C.  LCX. 50% ostial narrowing of the AV groove branch in the mid vessel  which is a bifurcation lesion with a large marginal branch which itself  did not appear to have any significant disease. The AV groove branch of  the left circumflex continues to provide a posterior descending artery  branch and the posterolateral branch (codominant vessel). D.  RCA. Codominant vessel with no significant angiographic disease. 2.  Normal left ventricular size with hyperdynamic left ventricular  systolic function with an estimated ejection fraction of 75%. 3.  Systemic hypertension. 4.  Elevated left ventricular end-diastolic pressure.         Valery Craig MD    D: 01/11/2022 12:23:00       T: 01/11/2022 12:25:30     [email or web address]  Job#: [id number]     Doc#: [id number]    CC:

## 2022-01-11 NOTE — PROGRESS NOTES
[address] [facility]  Internal Medicine Residency / [address]    Attending Physician Statement  I have discussed the case, including pertinent history and exam findings with the resident and the team.  I have seen and examined the patient and the key elements of the encounter have been performed by me. I agree with the assessment, plan and orders as documented by the resident. NO ORTHOSTATIC HYPOTENSION  Having Cardiac cath today for angina and abnormal stress test    Remainder of medical problems as per resident note.       Harshal Castellon MD [facility]  Internal Medicine Residency Faculty

## 2022-01-11 NOTE — CONSULTS
Inpatient Cardiology Consultation      Reason for Consult:  Abnormal Nuclear Stress Test. Patient presented with atypical chest pain with Hx HTN, HLD, and DM    Consulting Physician: Dr. Jaquan Pedersen    Requesting Physician:  Dr. Soto Restrepo    Date of Consultation: 1/11/2022    HISTORY OF PRESENT ILLNESS:   Ms. Lyssa Pitts is a 66year old  female who followed with Dr. Jaquan Pedersen in the remote past and was most recently seen in consultation with Dr. Vidya Joseph on 04/25/2014. Her medical history includes patent coronary arteries per cardiac catheterization in 2014, DM on Insulin, HTN, HLD, morbid obesity, asthma, SERENA with noncomplinace with Cpap. Ms. Lyssa Pitts presented to [facility] ED on 01/09/2022 with complaints of chest pain. She states that prior to presentation over the course of the past several weeks she has had new dyspnea on exertion. She states that she has chronic, unchanged orthopnea and PND and states that she does not have a CPAP at this time. She states that on 01/09/2022 when she got out of bed in the morning she developed right-sided chest pain with radiation to the left side of her chest and underneath her upper left inner arm and also to her left neck. He states that this sensation lasted several minutes in duration. She states that she was concerned as \" it did not feel right\". Upon arrival to the ED her VS were oral temperature 98.5-74-/86-93% on RA. EKG SR. Troponin of 21. D-dimer 247. CTA was significant artifact, not ideal with noted 8 x 7 mm density to the right upper lobe. WBC 6. H&H 11.7/38. 8. . K4.6.  BUN/SCR 16/0.9. CXR unremarkable. She received . Repeat troponin was 18 and 20. She was admitted to a telemetry monitored unit.   Cardiology was consulted for management of abnormal stress test as she underwent a Lexiscan MPS on 01/10/2022 that revealed a large moderately fixed defect to the inferior and inferior lateral wall without reversibility and a small mild defect to the mid-distal anterior wall suggesting ischemia. No TID. High risk study noted with an EF of 74%. Please note: past medical records were reviewed per electronic medical record (EMR) - see detailed reports under Past Medical/ Surgical History. Past Medical and Surgical History:    1. 08/10/11 Lexiscan MPS [facility]):  Normal Lexiscan portion. No evidence for inducible ischemia. No previous myocardial infarction. Ejection fraction 77%. 2. Lexiscan MPS 04/25/2014: Reversible inferolateral wall perfusion defect. Finding suggests left ventricular myocardium at risk for stress-induced ischemia. EF >70%. 3. TTE 04/27/2014 (Dr. Daquan Pond): Normal left ventricle size and systolic function. No wall motion abnormalities. Mild concentric left ventricular hypertrophy. Ejection fraction is visually estimated at >55%. Normal right ventricular size and function. Aortic root is sclerotic and calcified  4. Cardiac Catheterization (Dr. Daquan Pond) 04/28/2014: Left main:  The left main artery is a short vessel which did not appear to have any significant angiographic disease. Left anterior descending: The left anterior descending artery is a moderate-sized vessel which has minor luminal irregularities in its middle segment but without any significant angiographic luminal narrowing. The diagonal branches also did not appear to have any significant disease. Left circumflex: The left circumflex is a large, codominant vessel which did not appear to have any significant angiographic disease. Right coronary artery:  The right coronary artery is a moderate-sized codominant vessel which did not appear to have any significant angiographic disease. LEFT VENTRICULOGRAPHY:  The left ventricle is normal in size and contractility with an estimated ejection fraction of 60% to 65%. There was no mitral regurgitation.  RIGHT FEMORAL ARTERY ANGIOGRAPHY:  The right common femoral artery and the proximal segments of the right adhesions. 22. Status post excision of sebaceous cyst from abdomen. 23. Status post  release of hammertoes bilaterally. 24. Status post lumbar laminectomy for herniated L4-L5 disk. 25. 08/03/13, bone biopsy:  Ulcerations with retrograde joint contracture of the proximal interphalangeal joint, 2nd digit of right and 3rd digit of left. Status post deep soft tissue cultures with known biopsies and correction of deformity of the 2nd digit of the right and 3rd digit of left (Dr. Estephania Brewer). 26. Iron-deficiency anemia. Medications Prior to admit:  Prior to Admission medications    Medication Sig Start Date End Date Taking? Authorizing Provider   montelukast (SINGULAIR) 10 MG tablet Take 10 mg by mouth nightly   Yes Historical Provider, MD   ALPRAZolam (XANAX) 0.5 MG tablet Take 0.5 mg by mouth daily as needed for Anxiety.    Yes Historical Provider, MD   meclizine (ANTIVERT) 25 MG tablet Take 25 mg by mouth 3 times daily as needed for Dizziness   Yes Historical Provider, MD   rOPINIRole (REQUIP) 1 MG tablet Take 1 mg by mouth 3 times daily   Yes Historical Provider, MD   carbidopa-levodopa (SINEMET CR)  MG per extended release tablet Take 1 tablet by mouth 3 times daily 12/27/21  Yes ERIC Dover   Multiple Vitamin (MULTIVITAMIN) TABS tablet Take 1 tablet by mouth daily 10/26/21  Yes RASHARD Cervantes CNP   Cholecalciferol (VITAMIN D) 25 MCG TABS Take 1 tablet by mouth daily 10/26/21  Yes RASHARD Cervantes CNP   insulin glargine (LANTUS) 100 UNIT/ML injection vial Inject 10 Units into the skin nightly  Patient taking differently: Inject 10 Units into the skin nightly Takes between 10 units and 40 units according to blood sugar result 10/25/21  Yes Tasha Machuca MD   doxepin (SINEQUAN) 10 MG capsule Take 10 mg by mouth nightly   Yes Historical Provider, MD   venlafaxine (EFFEXOR XR) 150 MG extended release capsule Take 150 mg by mouth daily   Yes Historical Provider, MD losartan (COZAAR) 50 MG tablet Take 50 mg by mouth daily   Yes Historical Provider, MD   omeprazole (PRILOSEC) 40 MG delayed release capsule Take 40 mg by mouth daily    Yes Historical Provider, MD   lamoTRIgine (LAMICTAL) 200 MG tablet Take 200 mg by mouth daily  10/18/19  Yes Historical Provider, MD   atorvastatin (LIPITOR) 20 MG tablet Take 1 tablet by mouth daily 10/21/19  Yes Dima Leonard MD   insulin aspart (NOVOLOG FLEXPEN) 100 UNIT/ML injection pen INJECT 0-10 UNITS INTO THE SKIN THREE TIMES DAILY(BEFORE MEALS) SLIDING SCALE (MAX DAILY 30 UNITS)  Patient taking differently: Indications: med. approved 6/1/19-7/29/20 INJECT 0-10 UNITS INTO THE SKIN THREE TIMES DAILY(BEFORE MEALS) SLIDING SCALE (MAX DAILY 30 UNITS) 7/29/19  Yes Dima Loenard MD   aspirin 81 MG tablet Take 81 mg by mouth nightly   Yes Historical Provider, MD   blood glucose test strips (ACCU-CHEK RIGOBERTO PLUS) strip 1 each by In Vitro route 2 times daily Indications: DISP ACCU-CHEK As needed.     Historical Provider, MD       Current Medications:    Current Facility-Administered Medications: enoxaparin (LOVENOX) injection 40 mg, 40 mg, SubCUTAneous, BID  amLODIPine (NORVASC) tablet 10 mg, 10 mg, Oral, Daily  hydrALAZINE (APRESOLINE) injection 10 mg, 10 mg, IntraVENous, Q6H PRN  loperamide (IMODIUM) capsule 2 mg, 2 mg, Oral, 4x Daily PRN  dicyclomine (BENTYL) capsule 10 mg, 10 mg, Oral, TID PRN  sodium chloride flush 0.9 % injection 5-40 mL, 5-40 mL, IntraVENous, 2 times per day  sodium chloride flush 0.9 % injection 5-40 mL, 5-40 mL, IntraVENous, PRN  0.9 % sodium chloride infusion, 25 mL, IntraVENous, PRN  ondansetron (ZOFRAN-ODT) disintegrating tablet 4 mg, 4 mg, Oral, Q8H PRN **OR** ondansetron (ZOFRAN) injection 4 mg, 4 mg, IntraVENous, Q6H PRN  polyethylene glycol (GLYCOLAX) packet 17 g, 17 g, Oral, Daily PRN  acetaminophen (TYLENOL) tablet 650 mg, 650 mg, Oral, Q6H PRN **OR** acetaminophen (TYLENOL) suppository 650 mg, 650 tingling  · Cardiovascular: Complains of chest pain with radiation to her left arm and left neck-see HPI. Denies palpitations, and feelings of heart racing. · Respiratory: Complains of MORGAN-see HPI. Complains of chronic and unchanged orthopnea and PND  · Gastrointestinal: Denies heartburn, nausea/vomiting, and constipation, black/bloody, and tarry stools. Planes of diarrhea. · Genitourinary: Denies dysuria and hematuria  · Hematologic: Denies excessive bruising or bleeding  · Lymphatic: Denies lumps and bumps to neck, axilla, breast, and groin  · Endocrine: Denies excessive thirst. Denies intolerance to hot and cold  · GYN: Postmenopausal state; Denies vaginal bleeding. · Psychiatric: Complains of anxiety and depression. PHYSICAL EXAM:   BP (!) 146/94   Pulse 83   Temp 98.6 °F (37 °C) (Oral)   Resp 18   Ht 5' (1.524 m)   Wt 219 lb 9.6 oz (99.6 kg)   SpO2 95%   BMI 42.89 kg/m²   CONST:  Well developed, morbidly obese, elderly  female who appears stated age. Awake, alert, cooperative, no apparent distress  HEENT:   Head- Normocephalic, atraumatic   Eyes- Conjunctivae pink, anicteric  Throat- Oral mucosa pink and moist  Neck-  No stridor, trachea midline, no jugular venous distention. No adenopathy   CHEST: Chest symmetrical and non-tender to palpation. No accessory muscle use or intercostal retractions  RESPIRATORY: Lung sounds - clear throughout fields   CARDIOVASCULAR:     No carotid bruit  Heart Inspection- shows no noted pulsations  Heart Palpation- no heaves or thrills; PMI is non-displaced   Heart Ausculation- Regular rate and rhythm, systolic murmur. No s3, s4 or rub   PV: No lower extremity edema. No varicosities. Pedal pulses palpable, no clubbing or cyanosis   ABDOMEN: Soft, morbidly obese, non-tender to light palpation. Bowel sounds present. No palpable masses no organomegaly; no abdominal bruit  MS: Good muscle strength and tone. No atrophy or abnormal movements.    : Deferred  SKIN: Warm and dry no statis dermatitis or ulcers   NEURO / PSYCH: Oriented to person, place and time. Speech clear and appropriate. Follows all commands. Pleasant affect     DATA:    ECG: As above  Tele strips: SR  Diagnostic:      Intake/Output Summary (Last 24 hours) at 1/11/2022 0747  Last data filed at 1/10/2022 2243  Gross per 24 hour   Intake 240 ml   Output 600 ml   Net -360 ml       Labs:   CBC:   Recent Labs     01/10/22  0651 01/10/22  1548   WBC 5.8 5.1   HGB 11.2* 11.8   HCT 37.3 39.1    227     BMP:   Recent Labs     01/10/22  1548 01/11/22  0631    140   K 3.7 3.6   CO2 26 26   BUN 13 11   CREATININE 0.8 0.7   LABGLOM >60 >60   CALCIUM 9.0 9.3     Lab Results   Component Value Date    LABA1C 6.4 (H) 10/24/2021   FASTING LIPID PANEL:  Lab Results   Component Value Date    CHOL 148 10/25/2021    HDL 59 10/25/2021    LDLCALC 60 10/25/2021    TRIG 144 10/25/2021     LIVER PROFILE:  Recent Labs     01/09/22  1230   AST 10   ALT <5   LABALBU 3.6     Results for Onesimo Chan (MRN [id number]) as of 1/11/2022 08:00   Ref. Range 1/9/2022 12:30 1/9/2022 13:17 1/9/2022 22:52   Troponin, High Sensitivity Latest Ref Range: 0 - 9 ng/L 21 (H) 18 (H) 20 (H)     01/09/2022 CXR:   No acute pulmonary process. 01/09/2022 Pulmonary CTA:   1.  Due a combination of artifacts and not so ideal contrast density some of the segment subsegmental branches of the pulmonary artery circulation of both lungs, making difficult to proper evaluate for pulmonary emboli.  Consider repeat examination. 2.  No aneurysm formation or dissection of thoracic aorta. 3.  No acute pulmonary process. 4.  Presence of  8 x 7 mm confluent density in the upper posterior aspect of the right upper lobe.  This could be suspicious lesion not seen on previous study of a June 2014.   5.  Consider short-term follow-up study 3 months time interval or correlation with PET-CT scan.    01/10/2022 Lexiscan MPS:   1.  ECG during the infusion did not change. 2.  The myocardial perfusion imaging was abnormal.  3.  The abnormality was a large sized, moderate fixed defect involving the inferior and inferolateral wall suggestive prior infarct without any reversibility. There was also a small sized mild perfusion defect involving the mid to distal anterior wall suggestive ischemia. 4.  Overall left ventricular systolic function was normal without  regional wall motion abnormalities. 5.  No transient ischemic dilatation. 6.  High risk general pharmacologic stress test.       IMPRESSION and PLAN to follow per Dr. Oswaldo Sellers     Electronically signed by RASHARD Appiah CNP on 1/11/2022 at 7:47 AM     Signed          I personally and independently saw and examined patient and reviewed all done pertinent laboratory data, imaging studies, ECGs and rhythm strips. I have reviewed and agree with the APN history and physical exam as documented in the above note. Electronically signed by Zurdo Huerta MD on 1/11/2022 at 11:28 AM    We were asked to see the patient for an abnormal stress test.      IMPRESSION:  1. Chest pain, doubt cardiac  2. Abnormal stress test, reported as high risk, suspect false positive secondary to attentuation artifacts   3. No significant CAD on cath in 2014 (for abnormal stress test)  4. Echo 02/2021 EF 65-70% with no gross valvular abnormalities (technically limited study)  5. RUL density on CTA of the chest 01/10/2022  6. HTN, not adequately controlled  7. DM, insulin requiring  8. HLD  9. Morbid obesity   10. Asthma  11. SERENA, noncompliant with Cpap  12. Anxiety and depression  13. Parkinson's disease  14. H/o right breast cancer s/p lumpectomy  15. Remote Hx of stomach ulcers with negative EGD and colonoscopy in 2013     PLAN:   1. Cardiac catheterization (AUC: Indication 17 Score 9) +/- PCI. Risks, benefits, and alternative therapies to the procedure explained. She understood and consent to proceed.    2. Further recommendations to follow.      Electronically signed by Angelo Yañez MD on 1/11/2022 at 9:16 AM

## 2022-01-11 NOTE — PROGRESS NOTES
[facility]  Internal Medicine Residency Program  Progress Note - House Team 1    Patient:  Erinn Rodriguez 66 y.o. female MRN: [id number]     Date of Service: 1/11/2022     CC: chest pain  Overnight events: Diarrhea x4, PRN loperamide and dicyclomine with improvement in sxs     Subjective     Patient seen and examined this AM, stable and in no acute distress. Still with daytime fatigue and baseline dyspnea this AM. She states she has SERENA but does not have a CPAP at home. Denying any further diarrheal episodes as well as any chest pain, palpitations, subjective fevers, chills, N/V, or extremity swelling or pain. Objective     Physical Exam:  · Vitals: BP (!) 146/94   Pulse 83   Temp 98.6 °F (37 °C) (Oral)   Resp 18   Ht 5' (1.524 m)   Wt 219 lb 9.6 oz (99.6 kg)   SpO2 95%   BMI 42.89 kg/m²     · I & O - 24hr: No intake/output data recorded. · General Appearance: alert, appears stated age and cooperative  · HEENT:  Head: Normocephalic, no lesions, without obvious abnormality. · Neck: no adenopathy, no carotid bruit, no JVD, supple, symmetrical, trachea midline and thyroid not enlarged, symmetric, no tenderness/mass/nodules  · Lung: clear to auscultation bilaterally  · Heart: regular rate and rhythm, S1, S2 normal, no murmur, click, rub or gallop  · Abdomen: soft, non-tender; bowel sounds normal; no masses,  no organomegaly  · Extremities:  extremities normal, atraumatic, no cyanosis or edema  · Musculokeletal: No joint swelling, no muscle tenderness. ROM normal in all joints of extremities.    · Neurologic: Mental status: Alert, oriented, thought content appropriate  Subject  Pertinent Labs & Imaging Studies   genesis  CBC with Differential:    Lab Results   Component Value Date    WBC 5.1 01/10/2022    RBC 4.49 01/10/2022    HGB 11.8 01/10/2022    HCT 39.1 01/10/2022     01/10/2022    MCV 87.1 01/10/2022    MCH 26.3 01/10/2022    MCHC 30.2 01/10/2022    RDW 14.8 01/10/2022 NRBC 0.0 03/15/2019    SEGSPCT 74 08/20/2013    LYMPHOPCT 26.0 01/10/2022    MONOPCT 5.1 01/10/2022    MYELOPCT 0.9 03/15/2019    EOSPCT 1 06/16/2017    BASOPCT 0.8 01/10/2022    MONOSABS 0.26 01/10/2022    LYMPHSABS 1.33 01/10/2022    EOSABS 0.06 01/10/2022    BASOSABS 0.04 01/10/2022     BMP:    Lab Results   Component Value Date     01/11/2022    K 3.6 01/11/2022     01/11/2022    CO2 26 01/11/2022    BUN 11 01/11/2022    LABALBU 3.6 01/09/2022    CREATININE 0.7 01/11/2022    CALCIUM 9.3 01/11/2022    GFRAA >60 01/11/2022    LABGLOM >60 01/11/2022    GLUCOSE 141 01/11/2022     Stress Test 1/10/2022  1.  ECG during the infusion did not change. 2.  The myocardial perfusion imaging was abnormal.   3.  The abnormality was a large sized, moderate fixed defect involving   the inferior and inferolateral wall suggestive prior infarct without   any reversibility. There was also a small sized mild perfusion defect   involving the mid to distal anterior wall suggestive ischemia. 4.  Overall left ventricular systolic function was normal without   regional wall motion abnormalities. 5.  No transient ischemic dilatation. 6.  High risk general pharmacologic stress test.     Resident's Assessment and Plan     Erin Garcia is a 66 y.o. female w/ PMH depression, anxiety, asthma, CAD, breast cancer s/p lumpectomy, CKD, SERENA on CPAP, T2DM with neuropathy, HTN, HLD, hypothyroidism, Parkinson disease, and restless leg syndrome who presented with CC right sided chest pain for several days.      1. Atypical/non-anginal chest pain              -Presented with intermittent R sided chest pain relieved with Tylenol, b/l arm pain and L sided neck pain.  These sxs have mostly resolved since admission.              -Risk factors: Hx of CAD, HTN, HLD, T2DM, obesity              -10/2021: A1c 6.4%, LDL 60, HDL 59              -Trop 21-18-20              -EKG NSR              -2014 cath: minimal luminal irregularities in mid LAD without significant narrowing              -2021 Echo: EF 65-70% with moderate concentric LVH              -1/10: stress test reveals large sized, fixed defect in inferior and inferiolateral wall suggestive prior infarct without any reversibility. Also a small sized perfusion defect involving mid to distal anterior wall suggestive ischemia   -cardiology consulted, appreciate reccommendations   -1/11 pending results of catheterization and echo                          -continue ASA, lovenox, lipitor     2. Hx of HTN              -HTN overnight to 169/82 (1/11)              -was endorsing some dizziness, especially with standing   -given hx of PD and carbidopa/levodopa, standing blood pressures taken to r/o orthostatic changes. Found to be negative (173/105 BPM 92, 186/89 BPM 87)              -home amlodipine increased to 10mg this AM with PRN hydralazine   -continue losarton 50mg               -monitor VS     3. Hx of HLD              -continue home lipitor    4. New onset diarrhea   -Diarrheal episodes x4 overnight   -resolved with PRN loperamide and dicyclomine   -pending C. Diff   -monitor     5. Confluent density of RUL on CTA, COVID PNA vs viral PNA vs effusion vs artifact              -CTA performed outside [facility] found to have confluent density on RUL with artifacts, unable to r/o PE              -With chronic cough and dyspnea/orthopnea at baseline              -Per patient, recently COVID +ve 2 weeks prior, with negative test a week later. Rapid test on admission -ve   -no advancement of sxs, no systemic signs since admission   -monitor sxs     6. T2DM              -on home lantus 10u nightly and LDSS              -POCT glucose    7. Hx of SERENA   -endorsing daytime fatigue    -patient states she does not have a CPAP at home   -nightly CPAP     8.  Anxiety/depression              -on home lamictal, effexor, and buspirone              -per patient, apartment neighbor significant stressor at home -monitor     9. Parkinson Disease              -on home carbodipa/levodopa and doxepin              -monitor     10.  Restless leg syndrome              -on home ropinirole              -monitor    PT/OT evaluation: not indicated  DVT prophylaxis/ GI prophylaxis: lovenox/protonix   Disposition: continue to monitor on floor    Ezequiel Springer, M4  Attending physician: Dr. Ivanna Flores

## 2022-01-11 NOTE — PLAN OF CARE
Problem: Falls - Risk of:  Goal: Will remain free from falls  Description: Will remain free from falls  Outcome: Met This Shift     Problem: Skin Integrity:  Goal: Will show no infection signs and symptoms  Description: Will show no infection signs and symptoms  Outcome: Met This Shift     Problem:  Activity:  Goal: Ability to tolerate increased activity will improve  Description: Ability to tolerate increased activity will improve  Outcome: Met This Shift     Problem: Serum Glucose Level - Abnormal:  Goal: Ability to maintain appropriate glucose levels will improve  Description: Ability to maintain appropriate glucose levels will improve  Outcome: Not Met This Shift

## 2022-01-26 DIAGNOSIS — G20 PARKINSON DISEASE (HCC): ICD-10-CM

## 2022-01-26 RX ORDER — CARBIDOPA AND LEVODOPA 50; 200 MG/1; MG/1
1 TABLET, EXTENDED RELEASE ORAL 3 TIMES DAILY
Qty: 270 TABLET | Refills: 3 | Status: ON HOLD
Start: 2022-01-26 | End: 2022-07-07

## 2022-02-08 RX ORDER — ROPINIROLE 1 MG/1
1 TABLET, FILM COATED ORAL 3 TIMES DAILY
Qty: 270 TABLET | Refills: 3 | Status: ON HOLD
Start: 2022-02-08 | End: 2022-07-07

## 2022-03-07 ENCOUNTER — APPOINTMENT (OUTPATIENT)
Dept: CT IMAGING | Age: 72
End: 2022-03-07
Payer: MEDICARE

## 2022-03-07 ENCOUNTER — APPOINTMENT (OUTPATIENT)
Dept: GENERAL RADIOLOGY | Age: 72
End: 2022-03-07
Payer: MEDICARE

## 2022-03-07 ENCOUNTER — HOSPITAL ENCOUNTER (EMERGENCY)
Age: 72
Discharge: HOME | End: 2022-03-07
Payer: MEDICARE

## 2022-03-07 VITALS
RESPIRATION RATE: 18 BRPM | OXYGEN SATURATION: 100 % | TEMPERATURE: 98 F | SYSTOLIC BLOOD PRESSURE: 152 MMHG | HEART RATE: 79 BPM | DIASTOLIC BLOOD PRESSURE: 72 MMHG

## 2022-03-07 DIAGNOSIS — S09.90XA INJURY OF HEAD, INITIAL ENCOUNTER: ICD-10-CM
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
## 2022-03-07 DIAGNOSIS — S83.91XA SPRAIN OF RIGHT KNEE, UNSPECIFIED LIGAMENT, INITIAL ENCOUNTER: ICD-10-CM

## 2022-03-07 PROCEDURE — 73590 X-RAY EXAM OF LOWER LEG: CPT

## 2022-03-07 PROCEDURE — 99285 EMERGENCY DEPT VISIT HI MDM: CPT

## 2022-03-07 PROCEDURE — 73630 X-RAY EXAM OF FOOT: CPT

## 2022-03-07 PROCEDURE — 70450 CT HEAD/BRAIN W/O DYE: CPT

## 2022-03-07 PROCEDURE — 73562 X-RAY EXAM OF KNEE 3: CPT

## 2022-03-07 PROCEDURE — 6370000000 HC RX 637 (ALT 250 FOR IP): Performed by: PHYSICIAN ASSISTANT

## 2022-03-07 PROCEDURE — 72125 CT NECK SPINE W/O DYE: CPT

## 2022-03-07 RX ORDER — ACETAMINOPHEN 500 MG
1000 TABLET ORAL ONCE
Status: COMPLETED | OUTPATIENT
Start: 2022-03-07 | End: 2022-03-07

## 2022-03-07 RX ADMIN — ACETAMINOPHEN 1000 MG: 500 TABLET ORAL at 14:57

## 2022-03-07 ASSESSMENT — PAIN DESCRIPTION - LOCATION: LOCATION: KNEE

## 2022-03-07 ASSESSMENT — ENCOUNTER SYMPTOMS
BACK PAIN: 0
PHOTOPHOBIA: 0
EYE PAIN: 0
COLOR CHANGE: 0
CHEST TIGHTNESS: 0
SHORTNESS OF BREATH: 0
EYE REDNESS: 0
COUGH: 0

## 2022-03-07 ASSESSMENT — PAIN DESCRIPTION - DESCRIPTORS: DESCRIPTORS: SORE

## 2022-03-07 ASSESSMENT — PAIN SCALES - GENERAL
PAINLEVEL_OUTOF10: 6
PAINLEVEL_OUTOF10: 10

## 2022-03-07 ASSESSMENT — PAIN DESCRIPTION - PAIN TYPE: TYPE: ACUTE PAIN

## 2022-03-07 ASSESSMENT — PAIN DESCRIPTION - ORIENTATION: ORIENTATION: RIGHT

## 2022-03-07 NOTE — ED PROVIDER NOTES
[facility]        Department of Emergency Medicine   ED  Provider Note  Admit Date/RoomTime: 3/7/2022  2:35 PM  ED Room: [place] [address]  HPI:  3/7/22, Time: 2:48 PM EST      The patient is a 76-year-old female presenting to the emergency department after mechanical fall at assisted living. Patient states she went to grab onto a bathroom bar to get off the toilet and grab the toilet paper dispenser instead. She states she lost her  and fell to the ground and landed directly on her right knee. She then fell backward and hit the back of her head off of the floor. She does not take any blood thinners and denies any LOC. She does have a headache in the back of her head and pain to the back of her neck. Patient also reports pain in her right knee radiating down the front of her leg. She has not tried to walk on it since. She was able to get up with the help of 2 other people. Patient denies any vision changes, nausea/vomiting, numbness/tingling/weakness, dizziness, chest pain, shortness of breath, hip pain or back pain. The history is provided by the patient. No  was used. REVIEW OF SYSTEMS:  Review of Systems   Constitutional: Negative for activity change, chills, fatigue and fever. Eyes: Negative for photophobia, pain, redness and visual disturbance. Respiratory: Negative for cough, chest tightness and shortness of breath. Cardiovascular: Negative for chest pain, palpitations and leg swelling. Musculoskeletal: Positive for arthralgias, joint swelling and neck pain. Negative for back pain, myalgias and neck stiffness. Skin: Negative for color change, pallor and rash. Neurological: Positive for headaches. Negative for dizziness, seizures, syncope, weakness, light-headedness and numbness. Psychiatric/Behavioral: Negative for agitation, behavioral problems and confusion.       Pertinent positives and negatives are stated within HPI, all other systems reviewed and are negative.      --------------------------------------------- PAST HISTORY ---------------------------------------------  Past Medical History:  has a past medical history of Anxiety, Asthma, CAD (coronary artery disease), [facility] ([facility] Chronic kidney disease, Depression, [facility] H/O mammogram, Hx MRSA infection, Hyperlipidemia, Hypertension, Lateral epicondylitis, SERENA on CPAP, and Tubal ligation status. Past Surgical History:  has a past surgical history that includes Gallbladder surgery; Toe amputation; Cholecystectomy; Colonoscopy (7/29/15); Endoscopy, colon, diagnostic (7/19/15); Upper gastrointestinal endoscopy; lumbar laminectomy; Tonsillectomy; Breast lumpectomy; Breast reduction surgery; Cardiac catheterization (4/28/2014); and Cardiac catheterization (01/11/2022). Social History:  reports that she has never smoked. She has never used smokeless tobacco. She reports that she does not drink alcohol and does not use drugs. Family History: family history includes Cancer in her father and mother; Diabetes in her sister; Heart Disease in her sister; Other in her brother; Seizures in her son. The patients home medications have been reviewed. Allergies: Ciprofloxacin and Codeine    -------------------------------------------------- RESULTS -------------------------------------------------  All laboratory and radiology results have been personally reviewed by myself   LABS:  No results found for this visit on 03/07/22. RADIOLOGY:  Interpreted by Radiologist.  [address] [place] [address]   Final Result   No acute intracranial abnormality. CT CERVICAL SPINE WO CONTRAST   Final Result   No acute abnormality of the cervical spine. XR KNEE RIGHT (3 VIEWS)   Final Result   Osteopenia of the bony structures with degenerative changes seen within the   knee, ankle, and foot with no evidence of acute bony abnormality.          XR TIBIA FIBULA RIGHT (2 VIEWS)   Final Result Osteopenia of the bony structures with degenerative changes seen within the   knee, ankle, and foot with no evidence of acute bony abnormality. XR FOOT RIGHT (MIN 3 VIEWS)   Final Result   Osteopenia of the bony structures with degenerative changes seen within the   knee, ankle, and foot with no evidence of acute bony abnormality.             ------------------------- NURSING NOTES AND VITALS REVIEWED ---------------------------   The nursing notes within the ED encounter and vital signs as below have been reviewed. BP (!) 152/72   Pulse 79   Temp 98 °F (36.7 °C) (Oral)   Resp 18   SpO2 100%   Oxygen Saturation Interpretation: Normal      ---------------------------------------------------PHYSICAL EXAM--------------------------------------    Physical Exam  Vitals and nursing note reviewed. Constitutional:       General: She is not in acute distress. Appearance: Normal appearance. She is well-developed. She is not ill-appearing. HENT:      Head: Normocephalic and atraumatic. Mouth/Throat:      Mouth: Mucous membranes are moist.      Pharynx: Oropharynx is clear. Neck:      Vascular: No JVD. Trachea: No tracheal deviation. Comments: ML cervical TTP  From C5-C7. No crepitus or step off. Cardiovascular:      Rate and Rhythm: Normal rate and regular rhythm. Heart sounds: Normal heart sounds. No murmur heard. Pulmonary:      Effort: Pulmonary effort is normal. No respiratory distress. Breath sounds: Normal breath sounds. Musculoskeletal:         General: Tenderness present. No deformity. Normal range of motion. Cervical back: Normal range of motion and neck supple. Tenderness present. Comments: TTP over anterior RT knee with superficial overlying abrasion. FROM of the knee. TTP over lateral right shin with minor edema. FROM of ankle, toes and hip with +5/5 strength and distal sensation intact. TTP over rt heel.     Skin:     General: Skin is warm and dry.      Capillary Refill: Capillary refill takes less than 2 seconds. Coloration: Skin is not pale. Findings: No erythema. Neurological:      Mental Status: She is alert and oriented to person, place, and time. Mental status is at baseline. Motor: No weakness. Comments: CN III-XII intact. Psychiatric:         Mood and Affect: Mood normal.         Behavior: Behavior normal.         Thought Content: Thought content normal.            ------------------------------ ED COURSE/MEDICAL DECISION MAKING----------------------  Medications   acetaminophen (TYLENOL) tablet 1,000 mg (1,000 mg Oral Given 3/7/22 1457)         ED COURSE:      CT HEAD WO CONTRAST   Final Result   No acute intracranial abnormality. CT CERVICAL SPINE WO CONTRAST   Final Result   No acute abnormality of the cervical spine. XR KNEE RIGHT (3 VIEWS)   Final Result   Osteopenia of the bony structures with degenerative changes seen within the   knee, ankle, and foot with no evidence of acute bony abnormality. XR TIBIA FIBULA RIGHT (2 VIEWS)   Final Result   Osteopenia of the bony structures with degenerative changes seen within the   knee, ankle, and foot with no evidence of acute bony abnormality. XR FOOT RIGHT (MIN 3 VIEWS)   Final Result   Osteopenia of the bony structures with degenerative changes seen within the   knee, ankle, and foot with no evidence of acute bony abnormality. Procedures:  Procedures     Medical Decision Making:   MDM   76-year-old female presenting the ED after mechanical fall at assisted living. On arrival patient is afebrile and hemodynamically stable. She has no neurological deficits. She does not take any blood thinners. She is given Tylenol for the pain. CT head and cervical spine are unremarkable. X-ray of the right lower extremity are also unremarkable. Patient's knee  placed in an Ace wrap.   She is educated on RICE and encouraged to take Tylenol as needed for the pain. Patient discharged home in good condition. Counseling: The emergency provider has spoken with the patient and discussed todays results, in addition to providing specific details for the plan of care and counseling regarding the diagnosis and prognosis. Questions are answered at this time and they are agreeable with the plan.      --------------------------------- IMPRESSION AND DISPOSITION ---------------------------------    IMPRESSION  1. Fall, initial encounter    2. Injury of head, initial encounter    3. Sprain of right knee, unspecified ligament, initial encounter        DISPOSITION  Disposition: Discharge to home  Patient condition is good        Electronically signed by Leah Queen PA-C   DD: 3/7/22  **This report was transcribed using voice recognition software. Every effort was made to ensure accuracy; however, inadvertent computerized transcription errors may be present.   END OF ED PROVIDER NOTE         Leah Queen PA-C  03/07/22 [id number]

## 2022-03-11 ENCOUNTER — TELEPHONE (OUTPATIENT)
Dept: NEUROLOGY | Age: 72
End: 2022-03-11

## 2022-03-11 NOTE — TELEPHONE ENCOUNTER
Pt called stating that her legs are weaker ,having trouble walking has fallen. Notified patient that [facility] will not be in the office til 3/14 ,if her conditions worsen over weekend to go to the emergency room. Pt is currently taking sinemet 50/200mg tid  Requip .5mg bid and 1mg at night.    Please advise

## 2022-03-16 ENCOUNTER — APPOINTMENT (OUTPATIENT)
Dept: GENERAL RADIOLOGY | Age: 72
DRG: 202 | End: 2022-03-16
Payer: MEDICARE

## 2022-03-16 ENCOUNTER — NURSE TRIAGE (OUTPATIENT)
Dept: OTHER | Facility: CLINIC | Age: 72
End: 2022-03-16

## 2022-03-16 ENCOUNTER — TELEPHONE (OUTPATIENT)
Dept: NEUROLOGY | Age: 72
End: 2022-03-16

## 2022-03-16 ENCOUNTER — APPOINTMENT (OUTPATIENT)
Dept: CT IMAGING | Age: 72
DRG: 202 | End: 2022-03-16
Payer: MEDICARE

## 2022-03-16 ENCOUNTER — HOSPITAL ENCOUNTER (INPATIENT)
Age: 72
LOS: 1 days | Discharge: HOME | DRG: 202 | End: 2022-03-19
Attending: STUDENT IN AN ORGANIZED HEALTH CARE EDUCATION/TRAINING PROGRAM | Admitting: INTERNAL MEDICINE
Payer: MEDICARE

## 2022-03-16 DIAGNOSIS — J45.901 EXACERBATION OF ASTHMA, UNSPECIFIED ASTHMA SEVERITY, UNSPECIFIED WHETHER PERSISTENT: Primary | ICD-10-CM

## 2022-03-16 LAB
ALBUMIN SERPL-MCNC: 3.9 G/DL (ref 3.5–5.2)
ALP BLD-CCNC: 98 U/L (ref 35–104)
ALT SERPL-CCNC: <5 U/L (ref 0–32)
ANION GAP SERPL CALCULATED.3IONS-SCNC: 11 MMOL/L (ref 7–16)
AST SERPL-CCNC: 7 U/L (ref 0–31)
BACTERIA: ABNORMAL /HPF
BASOPHILS ABSOLUTE: 0.03 E9/L (ref 0–0.2)
BASOPHILS RELATIVE PERCENT: 0.6 % (ref 0–2)
BILIRUB SERPL-MCNC: 0.3 MG/DL (ref 0–1.2)
BILIRUBIN URINE: NEGATIVE
BLOOD, URINE: NEGATIVE
BUN BLDV-MCNC: 14 MG/DL (ref 6–23)
CALCIUM SERPL-MCNC: 8.5 MG/DL (ref 8.6–10.2)
CHLORIDE BLD-SCNC: 101 MMOL/L (ref 98–107)
CLARITY: CLEAR
CO2: 29 MMOL/L (ref 22–29)
COLOR: YELLOW
CREAT SERPL-MCNC: 0.9 MG/DL (ref 0.5–1)
EOSINOPHILS ABSOLUTE: 0.08 E9/L (ref 0.05–0.5)
EOSINOPHILS RELATIVE PERCENT: 1.5 % (ref 0–6)
EPITHELIAL CELLS, UA: ABNORMAL /HPF
GFR AFRICAN AMERICAN: >60
GFR NON-AFRICAN AMERICAN: >60 ML/MIN/1.73
GLUCOSE BLD-MCNC: 212 MG/DL (ref 74–99)
GLUCOSE URINE: NEGATIVE MG/DL
HCT VFR BLD CALC: 35.9 % (ref 34–48)
HEMOGLOBIN: 10.8 G/DL (ref 11.5–15.5)
IMMATURE GRANULOCYTES #: 0.08 E9/L
IMMATURE GRANULOCYTES %: 1.5 % (ref 0–5)
KETONES, URINE: NEGATIVE MG/DL
LEUKOCYTE ESTERASE, URINE: ABNORMAL
LYMPHOCYTES ABSOLUTE: 1.16 E9/L (ref 1.5–4)
LYMPHOCYTES RELATIVE PERCENT: 21.4 % (ref 20–42)
MAGNESIUM: 2 MG/DL (ref 1.6–2.6)
MCH RBC QN AUTO: 25.8 PG (ref 26–35)
MCHC RBC AUTO-ENTMCNC: 30.1 % (ref 32–34.5)
MCV RBC AUTO: 85.9 FL (ref 80–99.9)
MONOCYTES ABSOLUTE: 0.29 E9/L (ref 0.1–0.95)
MONOCYTES RELATIVE PERCENT: 5.4 % (ref 2–12)
NEUTROPHILS ABSOLUTE: 3.77 E9/L (ref 1.8–7.3)
NEUTROPHILS RELATIVE PERCENT: 69.6 % (ref 43–80)
NITRITE, URINE: NEGATIVE
PDW BLD-RTO: 15.2 FL (ref 11.5–15)
PH UA: 6.5 (ref 5–9)
PLATELET # BLD: 192 E9/L (ref 130–450)
PMV BLD AUTO: 10 FL (ref 7–12)
POTASSIUM REFLEX MAGNESIUM: 3.3 MMOL/L (ref 3.5–5)
PRO-BNP: 799 PG/ML (ref 0–125)
PROTEIN UA: >=300 MG/DL
RBC # BLD: 4.18 E12/L (ref 3.5–5.5)
RBC UA: ABNORMAL /HPF (ref 0–2)
SARS-COV-2, NAAT: NOT DETECTED
SODIUM BLD-SCNC: 141 MMOL/L (ref 132–146)
SPECIFIC GRAVITY UA: 1.02 (ref 1–1.03)
TOTAL PROTEIN: 6.6 G/DL (ref 6.4–8.3)
TROPONIN, HIGH SENSITIVITY: 18 NG/L (ref 0–9)
TROPONIN, HIGH SENSITIVITY: 20 NG/L (ref 0–9)
UROBILINOGEN, URINE: 0.2 E.U./DL
WBC # BLD: 5.4 E9/L (ref 4.5–11.5)
WBC UA: ABNORMAL /HPF (ref 0–5)

## 2022-03-16 PROCEDURE — 83880 ASSAY OF NATRIURETIC PEPTIDE: CPT

## 2022-03-16 PROCEDURE — 84484 ASSAY OF TROPONIN QUANT: CPT

## 2022-03-16 PROCEDURE — 85025 COMPLETE CBC W/AUTO DIFF WBC: CPT

## 2022-03-16 PROCEDURE — 6370000000 HC RX 637 (ALT 250 FOR IP): Performed by: STUDENT IN AN ORGANIZED HEALTH CARE EDUCATION/TRAINING PROGRAM

## 2022-03-16 PROCEDURE — 87635 SARS-COV-2 COVID-19 AMP PRB: CPT

## 2022-03-16 PROCEDURE — 94664 DEMO&/EVAL PT USE INHALER: CPT

## 2022-03-16 PROCEDURE — 99285 EMERGENCY DEPT VISIT HI MDM: CPT

## 2022-03-16 PROCEDURE — 71045 X-RAY EXAM CHEST 1 VIEW: CPT

## 2022-03-16 PROCEDURE — 71275 CT ANGIOGRAPHY CHEST: CPT

## 2022-03-16 PROCEDURE — 83735 ASSAY OF MAGNESIUM: CPT

## 2022-03-16 PROCEDURE — 81001 URINALYSIS AUTO W/SCOPE: CPT

## 2022-03-16 PROCEDURE — 6360000004 HC RX CONTRAST MEDICATION: Performed by: RADIOLOGY

## 2022-03-16 PROCEDURE — 93005 ELECTROCARDIOGRAM TRACING: CPT | Performed by: STUDENT IN AN ORGANIZED HEALTH CARE EDUCATION/TRAINING PROGRAM

## 2022-03-16 PROCEDURE — 36415 COLL VENOUS BLD VENIPUNCTURE: CPT

## 2022-03-16 PROCEDURE — 80053 COMPREHEN METABOLIC PANEL: CPT

## 2022-03-16 PROCEDURE — 83036 HEMOGLOBIN GLYCOSYLATED A1C: CPT

## 2022-03-16 RX ORDER — METHYLPREDNISOLONE SODIUM SUCCINATE 125 MG/2ML
125 INJECTION, POWDER, LYOPHILIZED, FOR SOLUTION INTRAMUSCULAR; INTRAVENOUS ONCE
Status: COMPLETED | OUTPATIENT
Start: 2022-03-16 | End: 2022-03-17

## 2022-03-16 RX ORDER — IPRATROPIUM BROMIDE AND ALBUTEROL SULFATE 2.5; .5 MG/3ML; MG/3ML
1 SOLUTION RESPIRATORY (INHALATION) ONCE
Status: COMPLETED | OUTPATIENT
Start: 2022-03-16 | End: 2022-03-16

## 2022-03-16 RX ORDER — POTASSIUM CHLORIDE 20 MEQ/1
40 TABLET, EXTENDED RELEASE ORAL ONCE
Status: COMPLETED | OUTPATIENT
Start: 2022-03-16 | End: 2022-03-16

## 2022-03-16 RX ORDER — IPRATROPIUM BROMIDE AND ALBUTEROL SULFATE 2.5; .5 MG/3ML; MG/3ML
2 SOLUTION RESPIRATORY (INHALATION) ONCE
Status: COMPLETED | OUTPATIENT
Start: 2022-03-16 | End: 2022-03-17

## 2022-03-16 RX ADMIN — IOPAMIDOL 60 ML: 755 INJECTION, SOLUTION INTRAVENOUS at 21:47

## 2022-03-16 RX ADMIN — POTASSIUM CHLORIDE 40 MEQ: 20 TABLET, EXTENDED RELEASE ORAL at 21:27

## 2022-03-16 RX ADMIN — IPRATROPIUM BROMIDE AND ALBUTEROL SULFATE 1 AMPULE: .5; 2.5 SOLUTION RESPIRATORY (INHALATION) at 21:12

## 2022-03-16 NOTE — TELEPHONE ENCOUNTER
Patient was told by her insurance that we are no longer in network for her to be seen here. They have already referred her to a new office.  Now has [facility]

## 2022-03-16 NOTE — TELEPHONE ENCOUNTER
Patient stated that she was referred to a new provider by her insurance.   She is now with [facility] and they told her we are not in network

## 2022-03-16 NOTE — TELEPHONE ENCOUNTER
Received call from [facility] with Red Flag Complaint. Subjective: Caller states \"short of breath, hard to walk having to stop and rest, fell in my bathroom last week\"     Current Symptoms: short of breath that only eases up when lying down sometimes    Onset: 1 month ago; worsening    Associated Symptoms: reduced activity    Pain Severity: denies    Temperature: denies    What has been tried: resting      Recommended disposition: Go to ED Now for difficulty breathing. Care advice provided, patient verbalizes understanding; denies any other questions or concerns; instructed to call back for any new or worsening symptoms. Patient agrees to go to [facility] ED as soon as she gets transportation there. Attention Provider: Thank you for allowing me to participate in the care of your patient. The patient was connected to triage in response to information provided to the ECC/PSC. Please do not respond through this encounter as the response is not directed to a shared pool.           Reason for Disposition   MODERATE difficulty breathing (e.g., speaks in phrases, SOB even at rest, pulse 100-120) of new-onset or worse than normal    Protocols used: BREATHING DIFFICULTY-ADULT-[place]

## 2022-03-17 LAB
ADENOVIRUS BY PCR: NOT DETECTED
BORDETELLA PARAPERTUSSIS BY PCR: NOT DETECTED
BORDETELLA PERTUSSIS BY PCR: NOT DETECTED
CHLAMYDOPHILIA PNEUMONIAE BY PCR: NOT DETECTED
CORONAVIRUS 229E BY PCR: NOT DETECTED
CORONAVIRUS HKU1 BY PCR: NOT DETECTED
CORONAVIRUS NL63 BY PCR: NOT DETECTED
CORONAVIRUS OC43 BY PCR: NOT DETECTED
EKG ATRIAL RATE: 84 BPM
EKG P AXIS: 52 DEGREES
EKG P-R INTERVAL: 196 MS
EKG Q-T INTERVAL: 422 MS
EKG QRS DURATION: 92 MS
EKG QTC CALCULATION (BAZETT): 498 MS
EKG R AXIS: 73 DEGREES
EKG T AXIS: 57 DEGREES
EKG VENTRICULAR RATE: 84 BPM
HBA1C MFR BLD: 6.9 % (ref 4–5.6)
HUMAN METAPNEUMOVIRUS BY PCR: NOT DETECTED
HUMAN RHINOVIRUS/ENTEROVIRUS BY PCR: NOT DETECTED
INFLUENZA A BY PCR: NOT DETECTED
INFLUENZA B BY PCR: NOT DETECTED
METER GLUCOSE: 292 MG/DL (ref 74–99)
METER GLUCOSE: 306 MG/DL (ref 74–99)
METER GLUCOSE: 320 MG/DL (ref 74–99)
MYCOPLASMA PNEUMONIAE BY PCR: NOT DETECTED
PARAINFLUENZA VIRUS 1 BY PCR: NOT DETECTED
PARAINFLUENZA VIRUS 2 BY PCR: NOT DETECTED
PARAINFLUENZA VIRUS 3 BY PCR: NOT DETECTED
PARAINFLUENZA VIRUS 4 BY PCR: NOT DETECTED
RESPIRATORY SYNCYTIAL VIRUS BY PCR: NOT DETECTED
SARS-COV-2, PCR: NOT DETECTED
TROPONIN, HIGH SENSITIVITY: 14 NG/L (ref 0–9)

## 2022-03-17 PROCEDURE — 6370000000 HC RX 637 (ALT 250 FOR IP): Performed by: FAMILY MEDICINE

## 2022-03-17 PROCEDURE — 96372 THER/PROPH/DIAG INJ SC/IM: CPT

## 2022-03-17 PROCEDURE — 36415 COLL VENOUS BLD VENIPUNCTURE: CPT

## 2022-03-17 PROCEDURE — 2700000000 HC OXYGEN THERAPY PER DAY

## 2022-03-17 PROCEDURE — S5553 INSULIN LONG ACTING 5 U: HCPCS | Performed by: FAMILY MEDICINE

## 2022-03-17 PROCEDURE — 6370000000 HC RX 637 (ALT 250 FOR IP): Performed by: INTERNAL MEDICINE

## 2022-03-17 PROCEDURE — 6370000000 HC RX 637 (ALT 250 FOR IP): Performed by: STUDENT IN AN ORGANIZED HEALTH CARE EDUCATION/TRAINING PROGRAM

## 2022-03-17 PROCEDURE — G0378 HOSPITAL OBSERVATION PER HR: HCPCS

## 2022-03-17 PROCEDURE — 6360000002 HC RX W HCPCS: Performed by: FAMILY MEDICINE

## 2022-03-17 PROCEDURE — 2580000003 HC RX 258: Performed by: FAMILY MEDICINE

## 2022-03-17 PROCEDURE — 99223 1ST HOSP IP/OBS HIGH 75: CPT | Performed by: INTERNAL MEDICINE

## 2022-03-17 PROCEDURE — 93010 ELECTROCARDIOGRAM REPORT: CPT | Performed by: INTERNAL MEDICINE

## 2022-03-17 PROCEDURE — 94640 AIRWAY INHALATION TREATMENT: CPT

## 2022-03-17 PROCEDURE — 82962 GLUCOSE BLOOD TEST: CPT

## 2022-03-17 PROCEDURE — 96375 TX/PRO/DX INJ NEW DRUG ADDON: CPT

## 2022-03-17 PROCEDURE — 84484 ASSAY OF TROPONIN QUANT: CPT

## 2022-03-17 PROCEDURE — 0202U NFCT DS 22 TRGT SARS-COV-2: CPT

## 2022-03-17 PROCEDURE — 96376 TX/PRO/DX INJ SAME DRUG ADON: CPT

## 2022-03-17 PROCEDURE — 6360000002 HC RX W HCPCS: Performed by: STUDENT IN AN ORGANIZED HEALTH CARE EDUCATION/TRAINING PROGRAM

## 2022-03-17 RX ORDER — METHYLPREDNISOLONE SODIUM SUCCINATE 40 MG/ML
40 INJECTION, POWDER, LYOPHILIZED, FOR SOLUTION INTRAMUSCULAR; INTRAVENOUS EVERY 6 HOURS
Status: DISCONTINUED | OUTPATIENT
Start: 2022-03-17 | End: 2022-03-18

## 2022-03-17 RX ORDER — PROMETHAZINE HYDROCHLORIDE 25 MG/1
12.5 TABLET ORAL EVERY 6 HOURS PRN
Status: DISCONTINUED | OUTPATIENT
Start: 2022-03-17 | End: 2022-03-17

## 2022-03-17 RX ORDER — NICOTINE POLACRILEX 4 MG
15 LOZENGE BUCCAL PRN
Status: DISCONTINUED | OUTPATIENT
Start: 2022-03-17 | End: 2022-03-19 | Stop reason: HOSPADM

## 2022-03-17 RX ORDER — POTASSIUM CHLORIDE 20 MEQ/1
40 TABLET, EXTENDED RELEASE ORAL PRN
Status: DISCONTINUED | OUTPATIENT
Start: 2022-03-17 | End: 2022-03-19 | Stop reason: HOSPADM

## 2022-03-17 RX ORDER — AMLODIPINE BESYLATE 10 MG/1
10 TABLET ORAL DAILY
Status: DISCONTINUED | OUTPATIENT
Start: 2022-03-17 | End: 2022-03-19 | Stop reason: HOSPADM

## 2022-03-17 RX ORDER — PANTOPRAZOLE SODIUM 40 MG/1
40 TABLET, DELAYED RELEASE ORAL
Status: DISCONTINUED | OUTPATIENT
Start: 2022-03-17 | End: 2022-03-19 | Stop reason: HOSPADM

## 2022-03-17 RX ORDER — ATORVASTATIN CALCIUM 20 MG/1
20 TABLET, FILM COATED ORAL DAILY
Status: DISCONTINUED | OUTPATIENT
Start: 2022-03-17 | End: 2022-03-19 | Stop reason: HOSPADM

## 2022-03-17 RX ORDER — MONTELUKAST SODIUM 10 MG/1
10 TABLET ORAL NIGHTLY
Status: DISCONTINUED | OUTPATIENT
Start: 2022-03-17 | End: 2022-03-19 | Stop reason: HOSPADM

## 2022-03-17 RX ORDER — ALPRAZOLAM 0.25 MG/1
0.5 TABLET ORAL DAILY PRN
Status: DISCONTINUED | OUTPATIENT
Start: 2022-03-17 | End: 2022-03-19 | Stop reason: HOSPADM

## 2022-03-17 RX ORDER — POLYETHYLENE GLYCOL 3350 17 G/17G
17 POWDER, FOR SOLUTION ORAL DAILY PRN
Status: DISCONTINUED | OUTPATIENT
Start: 2022-03-17 | End: 2022-03-19 | Stop reason: HOSPADM

## 2022-03-17 RX ORDER — LAMOTRIGINE 100 MG/1
200 TABLET ORAL DAILY
Status: DISCONTINUED | OUTPATIENT
Start: 2022-03-17 | End: 2022-03-19 | Stop reason: HOSPADM

## 2022-03-17 RX ORDER — INSULIN GLARGINE-YFGN 100 [IU]/ML
0.25 INJECTION, SOLUTION SUBCUTANEOUS NIGHTLY
Status: DISCONTINUED | OUTPATIENT
Start: 2022-03-17 | End: 2022-03-19 | Stop reason: HOSPADM

## 2022-03-17 RX ORDER — BUSPIRONE HYDROCHLORIDE 5 MG/1
5 TABLET ORAL 3 TIMES DAILY
Status: DISCONTINUED | OUTPATIENT
Start: 2022-03-17 | End: 2022-03-19 | Stop reason: HOSPADM

## 2022-03-17 RX ORDER — DEXTROSE MONOHYDRATE 25 G/50ML
12.5 INJECTION, SOLUTION INTRAVENOUS PRN
Status: DISCONTINUED | OUTPATIENT
Start: 2022-03-17 | End: 2022-03-19 | Stop reason: HOSPADM

## 2022-03-17 RX ORDER — SODIUM CHLORIDE 9 MG/ML
25 INJECTION, SOLUTION INTRAVENOUS PRN
Status: DISCONTINUED | OUTPATIENT
Start: 2022-03-17 | End: 2022-03-19 | Stop reason: HOSPADM

## 2022-03-17 RX ORDER — ROPINIROLE 1 MG/1
1 TABLET, FILM COATED ORAL 3 TIMES DAILY
Status: DISCONTINUED | OUTPATIENT
Start: 2022-03-17 | End: 2022-03-19 | Stop reason: HOSPADM

## 2022-03-17 RX ORDER — ACETAMINOPHEN 325 MG/1
650 TABLET ORAL EVERY 6 HOURS PRN
Status: DISCONTINUED | OUTPATIENT
Start: 2022-03-17 | End: 2022-03-19 | Stop reason: HOSPADM

## 2022-03-17 RX ORDER — ASPIRIN 81 MG/1
81 TABLET, CHEWABLE ORAL NIGHTLY
Status: DISCONTINUED | OUTPATIENT
Start: 2022-03-17 | End: 2022-03-19 | Stop reason: HOSPADM

## 2022-03-17 RX ORDER — DOXEPIN HYDROCHLORIDE 10 MG/1
10 CAPSULE ORAL NIGHTLY
Status: DISCONTINUED | OUTPATIENT
Start: 2022-03-17 | End: 2022-03-19 | Stop reason: HOSPADM

## 2022-03-17 RX ORDER — SODIUM CHLORIDE 0.9 % (FLUSH) 0.9 %
10 SYRINGE (ML) INJECTION EVERY 12 HOURS SCHEDULED
Status: DISCONTINUED | OUTPATIENT
Start: 2022-03-17 | End: 2022-03-19 | Stop reason: HOSPADM

## 2022-03-17 RX ORDER — ONDANSETRON 2 MG/ML
4 INJECTION INTRAMUSCULAR; INTRAVENOUS EVERY 6 HOURS PRN
Status: DISCONTINUED | OUTPATIENT
Start: 2022-03-17 | End: 2022-03-17

## 2022-03-17 RX ORDER — CARBIDOPA AND LEVODOPA 25; 100 MG/1; MG/1
2 TABLET, EXTENDED RELEASE ORAL 3 TIMES DAILY
Status: DISCONTINUED | OUTPATIENT
Start: 2022-03-17 | End: 2022-03-19 | Stop reason: HOSPADM

## 2022-03-17 RX ORDER — ISOSORBIDE MONONITRATE 30 MG/1
30 TABLET, EXTENDED RELEASE ORAL DAILY
Status: DISCONTINUED | OUTPATIENT
Start: 2022-03-17 | End: 2022-03-19 | Stop reason: HOSPADM

## 2022-03-17 RX ORDER — IPRATROPIUM BROMIDE AND ALBUTEROL SULFATE 2.5; .5 MG/3ML; MG/3ML
1 SOLUTION RESPIRATORY (INHALATION)
Status: DISCONTINUED | OUTPATIENT
Start: 2022-03-17 | End: 2022-03-19 | Stop reason: HOSPADM

## 2022-03-17 RX ORDER — METOPROLOL SUCCINATE 25 MG/1
25 TABLET, EXTENDED RELEASE ORAL DAILY
Status: DISCONTINUED | OUTPATIENT
Start: 2022-03-17 | End: 2022-03-19 | Stop reason: HOSPADM

## 2022-03-17 RX ORDER — ALBUTEROL SULFATE 2.5 MG/3ML
2.5 SOLUTION RESPIRATORY (INHALATION) EVERY 6 HOURS PRN
Status: DISCONTINUED | OUTPATIENT
Start: 2022-03-17 | End: 2022-03-19 | Stop reason: HOSPADM

## 2022-03-17 RX ORDER — HYDRALAZINE HYDROCHLORIDE 20 MG/ML
10 INJECTION INTRAMUSCULAR; INTRAVENOUS EVERY 6 HOURS PRN
Status: DISCONTINUED | OUTPATIENT
Start: 2022-03-17 | End: 2022-03-19 | Stop reason: HOSPADM

## 2022-03-17 RX ORDER — LOSARTAN POTASSIUM 50 MG/1
50 TABLET ORAL DAILY
Status: DISCONTINUED | OUTPATIENT
Start: 2022-03-17 | End: 2022-03-19 | Stop reason: HOSPADM

## 2022-03-17 RX ORDER — POTASSIUM CHLORIDE 7.45 MG/ML
10 INJECTION INTRAVENOUS PRN
Status: DISCONTINUED | OUTPATIENT
Start: 2022-03-17 | End: 2022-03-19 | Stop reason: HOSPADM

## 2022-03-17 RX ORDER — VENLAFAXINE HYDROCHLORIDE 150 MG/1
150 CAPSULE, EXTENDED RELEASE ORAL DAILY
Status: DISCONTINUED | OUTPATIENT
Start: 2022-03-17 | End: 2022-03-19 | Stop reason: HOSPADM

## 2022-03-17 RX ORDER — SODIUM CHLORIDE 0.9 % (FLUSH) 0.9 %
10 SYRINGE (ML) INJECTION PRN
Status: DISCONTINUED | OUTPATIENT
Start: 2022-03-17 | End: 2022-03-19 | Stop reason: HOSPADM

## 2022-03-17 RX ORDER — ACETAMINOPHEN 650 MG/1
650 SUPPOSITORY RECTAL EVERY 6 HOURS PRN
Status: DISCONTINUED | OUTPATIENT
Start: 2022-03-17 | End: 2022-03-19 | Stop reason: HOSPADM

## 2022-03-17 RX ORDER — DEXTROSE MONOHYDRATE 50 MG/ML
100 INJECTION, SOLUTION INTRAVENOUS PRN
Status: DISCONTINUED | OUTPATIENT
Start: 2022-03-17 | End: 2022-03-19 | Stop reason: HOSPADM

## 2022-03-17 RX ADMIN — INSULIN GLARGINE-YFGN 26 UNITS: 100 INJECTION, SOLUTION SUBCUTANEOUS at 22:54

## 2022-03-17 RX ADMIN — IPRATROPIUM BROMIDE AND ALBUTEROL SULFATE 1 AMPULE: .5; 2.5 SOLUTION RESPIRATORY (INHALATION) at 15:42

## 2022-03-17 RX ADMIN — SODIUM CHLORIDE, PRESERVATIVE FREE 10 ML: 5 INJECTION INTRAVENOUS at 22:51

## 2022-03-17 RX ADMIN — ROPINIROLE HYDROCHLORIDE 1 MG: 1 TABLET, FILM COATED ORAL at 22:54

## 2022-03-17 RX ADMIN — INSULIN LISPRO 8 UNITS: 100 INJECTION, SOLUTION INTRAVENOUS; SUBCUTANEOUS at 18:32

## 2022-03-17 RX ADMIN — SODIUM CHLORIDE, PRESERVATIVE FREE 10 ML: 5 INJECTION INTRAVENOUS at 12:57

## 2022-03-17 RX ADMIN — BUSPIRONE HYDROCHLORIDE 5 MG: 5 TABLET ORAL at 12:48

## 2022-03-17 RX ADMIN — METHYLPREDNISOLONE SODIUM SUCCINATE 40 MG: 40 INJECTION, POWDER, FOR SOLUTION INTRAMUSCULAR; INTRAVENOUS at 12:48

## 2022-03-17 RX ADMIN — AMLODIPINE BESYLATE 10 MG: 10 TABLET ORAL at 12:46

## 2022-03-17 RX ADMIN — IPRATROPIUM BROMIDE AND ALBUTEROL SULFATE 1 AMPULE: .5; 2.5 SOLUTION RESPIRATORY (INHALATION) at 11:39

## 2022-03-17 RX ADMIN — CARBIDOPA AND LEVODOPA 2 TABLET: 25; 100 TABLET, EXTENDED RELEASE ORAL at 03:15

## 2022-03-17 RX ADMIN — METHYLPREDNISOLONE SODIUM SUCCINATE 125 MG: 125 INJECTION, POWDER, FOR SOLUTION INTRAMUSCULAR; INTRAVENOUS at 00:14

## 2022-03-17 RX ADMIN — ROPINIROLE HYDROCHLORIDE 1 MG: 1 TABLET, FILM COATED ORAL at 12:47

## 2022-03-17 RX ADMIN — MONTELUKAST 10 MG: 10 TABLET, FILM COATED ORAL at 22:54

## 2022-03-17 RX ADMIN — INSULIN LISPRO 3 UNITS: 100 INJECTION, SOLUTION INTRAVENOUS; SUBCUTANEOUS at 22:54

## 2022-03-17 RX ADMIN — ISOSORBIDE MONONITRATE 30 MG: 30 TABLET, EXTENDED RELEASE ORAL at 12:46

## 2022-03-17 RX ADMIN — DOXEPIN HYDROCHLORIDE 10 MG: 10 CAPSULE ORAL at 22:54

## 2022-03-17 RX ADMIN — ATORVASTATIN CALCIUM 20 MG: 20 TABLET, FILM COATED ORAL at 12:46

## 2022-03-17 RX ADMIN — BUSPIRONE HYDROCHLORIDE 5 MG: 5 TABLET ORAL at 22:54

## 2022-03-17 RX ADMIN — CARBIDOPA AND LEVODOPA 2 TABLET: 25; 100 TABLET, EXTENDED RELEASE ORAL at 22:54

## 2022-03-17 RX ADMIN — ENOXAPARIN SODIUM 30 MG: 100 INJECTION SUBCUTANEOUS at 22:58

## 2022-03-17 RX ADMIN — METHYLPREDNISOLONE SODIUM SUCCINATE 40 MG: 40 INJECTION, POWDER, FOR SOLUTION INTRAMUSCULAR; INTRAVENOUS at 22:51

## 2022-03-17 RX ADMIN — CARBIDOPA AND LEVODOPA 2 TABLET: 25; 100 TABLET, EXTENDED RELEASE ORAL at 12:47

## 2022-03-17 RX ADMIN — VENLAFAXINE HYDROCHLORIDE 150 MG: 150 CAPSULE, EXTENDED RELEASE ORAL at 14:33

## 2022-03-17 RX ADMIN — METOPROLOL SUCCINATE 25 MG: 25 TABLET, EXTENDED RELEASE ORAL at 12:46

## 2022-03-17 RX ADMIN — IPRATROPIUM BROMIDE AND ALBUTEROL SULFATE 2 AMPULE: 2.5; .5 SOLUTION RESPIRATORY (INHALATION) at 00:07

## 2022-03-17 RX ADMIN — IPRATROPIUM BROMIDE AND ALBUTEROL SULFATE 1 AMPULE: .5; 2.5 SOLUTION RESPIRATORY (INHALATION) at 19:41

## 2022-03-17 RX ADMIN — LOSARTAN POTASSIUM 50 MG: 50 TABLET, FILM COATED ORAL at 12:46

## 2022-03-17 RX ADMIN — INSULIN LISPRO 4 UNITS: 100 INJECTION, SOLUTION INTRAVENOUS; SUBCUTANEOUS at 12:59

## 2022-03-17 RX ADMIN — PANTOPRAZOLE SODIUM 40 MG: 40 TABLET, DELAYED RELEASE ORAL at 12:46

## 2022-03-17 RX ADMIN — ROPINIROLE HYDROCHLORIDE 1 MG: 1 TABLET, FILM COATED ORAL at 03:15

## 2022-03-17 RX ADMIN — ASPIRIN 81 MG 81 MG: 81 TABLET ORAL at 22:54

## 2022-03-17 ASSESSMENT — PAIN SCALES - GENERAL
PAINLEVEL_OUTOF10: 0
PAINLEVEL_OUTOF10: 0

## 2022-03-17 NOTE — ED PROVIDER NOTES
Department of Emergency Medicine   ED  Provider Note  Admit Date/RoomTime: 3/16/2022  6:57 PM  ED Room: [facility] [place]          History of Present Illness:  3/16/22, Time: 9:24 PM EDT  Chief Complaint   Patient presents with    Shortness of Breath     pt reports increased sob and generalized weakness. Samara Lopez is a 66 y.o. female presenting to the ED for shortness of breath. The patient states that she has past medical history of asthma, diabetes and hypertension as well as coronary artery disease and breast cancer status post radiation. She has been feeling increasingly short of breath over the past month. Seems to be worse when she exerts herself or lies flat. Nothing seems to improve it. She does also hear herself wheezing and is unsure if this is her asthma. She has not been using an inhaler. She ran out of her nebulizer medication as well. She does have a cough is nonproductive. No fevers. No chest pain, abdominal pain, nausea, vomiting. She does note that she feels diffusely weak. She has had no recent trauma other than a fall a few weeks ago for which she presented to the emergency department. No recurrent falls. No numbness, tingling or focal weakness. She ambulates with a walker at baseline has been able to do so. No dysuria.     Review of Systems:   Constitutional symptoms: Denies fever  Eyes: Denies eye pain  Ears, Nose, Mouth, Throat: Denies ear pain  Cardiovascular: Denies chest pain  Respiratory: Positive for shortness of breath and cough  Gastrointestinal: Denies blood per rectum  Genitourinary: Denies hematuria  Skin: Denies rashes  Neurological: Positive for diffuse weakness  Musculoskeletal: Denies leg swelling    --------------------------------------------- PAST HISTORY ---------------------------------------------  Past Medical History:  has a past medical history of Anxiety, Asthma, CAD (coronary artery disease), [facility] Chronic kidney disease, Depression, Diabetes mellitus ([facility] [address] H/O mammogram, Hx MRSA infection, Hyperlipidemia, Hypertension, Lateral epicondylitis, SERENA on CPAP, and Tubal ligation status. Past Surgical History:  has a past surgical history that includes Gallbladder surgery; Toe amputation; Cholecystectomy; Colonoscopy (7/29/15); Endoscopy, colon, diagnostic (7/19/15); Upper gastrointestinal endoscopy; lumbar laminectomy; Tonsillectomy; Breast lumpectomy; Breast reduction surgery; Cardiac catheterization (4/28/2014); and Cardiac catheterization (01/11/2022). Social History:  reports that she has never smoked. She has never used smokeless tobacco. She reports that she does not drink alcohol and does not use drugs. Family History: family history includes Cancer in her father and mother; Diabetes in her sister; Heart Disease in her sister; Other in her brother; Seizures in her son. . Unless otherwise noted, family history is non contributory    The patients home medications have been reviewed. Allergies: Ciprofloxacin and Codeine    I have reviewed the past medical history, past surgical history, social history, and family history    ---------------------------------------------------PHYSICAL EXAM--------------------------------------    General: The patient is conversational and lying comfortably in bed. Head: Normocephalic and atraumatic. Eyes: Sclera non-icteric and no conjunctival injection  ENT: Nasal and oral membranes moist  Neck: Trachea midline. No JVD  Respiratory: Lungs clear to auscultation bilaterally. Patient speaking in full sentences. Patient is on 2 L nasal cannula. She is not on home oxygen. Cardiovascular: Regular rate. Trace pitting edema that symmetric  Gastrointestinal:  Abdomen is soft and nondistended. Bowel sounds present. There is no tenderness. There is no guarding or rebound. Musculoskeletal: No deformity. Pelvis stable. No bony tenderness to lower extremities. Skin: Skin warm and dry.   No rashes. Neurologic: No gross motor deficits. No aphasia. Pupils are equal and reactive to light. Extraocular eye movements intact. Sensation intact bilaterally. Symmetric facies. Tongue protrudes midline. 5 out of 5 symmetric strength of the upper and lower extremities. Negative finger-to-nose testing. Alert and oriented. Psychiatric: Normal affect.    -------------------------------------------------- RESULTS -------------------------------------------------  I have personally reviewed all laboratory and imaging results for this patient. Results are listed below.      LABS: (Lab results interpreted by me)  Results for orders placed or performed during the hospital encounter of 03/16/22   COVID-19, Rapid    Specimen: Nasopharyngeal Swab   Result Value Ref Range    SARS-CoV-2, NAAT Not Detected Not Detected   CBC with Auto Differential   Result Value Ref Range    WBC 5.4 4.5 - 11.5 E9/L    RBC 4.18 3.50 - 5.50 E12/L    Hemoglobin 10.8 (L) 11.5 - 15.5 g/dL    Hematocrit 35.9 34.0 - 48.0 %    MCV 85.9 80.0 - 99.9 fL    MCH 25.8 (L) 26.0 - 35.0 pg    MCHC 30.1 (L) 32.0 - 34.5 %    RDW 15.2 (H) 11.5 - 15.0 fL    Platelets 586 509 - 448 E9/L    MPV 10.0 7.0 - 12.0 fL    Neutrophils % 69.6 43.0 - 80.0 %    Immature Granulocytes % 1.5 0.0 - 5.0 %    Lymphocytes % 21.4 20.0 - 42.0 %    Monocytes % 5.4 2.0 - 12.0 %    Eosinophils % 1.5 0.0 - 6.0 %    Basophils % 0.6 0.0 - 2.0 %    Neutrophils Absolute 3.77 1.80 - 7.30 E9/L    Immature Granulocytes # 0.08 E9/L    Lymphocytes Absolute 1.16 (L) 1.50 - 4.00 E9/L    Monocytes Absolute 0.29 0.10 - 0.95 E9/L    Eosinophils Absolute 0.08 0.05 - 0.50 E9/L    Basophils Absolute 0.03 0.00 - 0.20 E9/L   Comprehensive Metabolic Panel w/ Reflex to MG   Result Value Ref Range    Sodium 141 132 - 146 mmol/L    Potassium reflex Magnesium 3.3 (L) 3.5 - 5.0 mmol/L    Chloride 101 98 - 107 mmol/L    CO2 29 22 - 29 mmol/L    Anion Gap 11 7 - 16 mmol/L    Glucose 212 (H) 74 - 99 mg/dL BUN 14 6 - 23 mg/dL    CREATININE 0.9 0.5 - 1.0 mg/dL    GFR Non-African American >60 >=60 mL/min/1.73    GFR African American >60     Calcium 8.5 (L) 8.6 - 10.2 mg/dL    Total Protein 6.6 6.4 - 8.3 g/dL    Albumin 3.9 3.5 - 5.2 g/dL    Total Bilirubin 0.3 0.0 - 1.2 mg/dL    Alkaline Phosphatase 98 35 - 104 U/L    ALT <5 0 - 32 U/L    AST 7 0 - 31 U/L   Troponin   Result Value Ref Range    Troponin, High Sensitivity 18 (H) 0 - 9 ng/L   Brain Natriuretic Peptide   Result Value Ref Range    Pro- (H) 0 - 125 pg/mL   Urinalysis with Microscopic   Result Value Ref Range    Color, UA Yellow Straw/Yellow    Clarity, UA Clear Clear    Glucose, Ur Negative Negative mg/dL    Bilirubin Urine Negative Negative    Ketones, Urine Negative Negative mg/dL    Specific Gravity, UA 1.025 1.005 - 1.030    Blood, Urine Negative Negative    pH, UA 6.5 5.0 - 9.0    Protein, UA >=300 (A) Negative mg/dL    Urobilinogen, Urine 0.2 <2.0 E.U./dL    Nitrite, Urine Negative Negative    Leukocyte Esterase, Urine TRACE (A) Negative    WBC, UA 1-3 0 - 5 /HPF    RBC, UA NONE 0 - 2 /HPF    Epithelial Cells, UA RARE /HPF    Bacteria, UA RARE (A) None Seen /HPF   Magnesium   Result Value Ref Range    Magnesium 2.0 1.6 - 2.6 mg/dL   Troponin   Result Value Ref Range    Troponin, High Sensitivity 20 (H) 0 - 9 ng/L   ,     RADIOLOGY:  Interpreted by Radiologist unless otherwise specified  CTA PULMONARY W CONTRAST   Final Result   No pulmonary embolism. Scattered areas of scarring and/or atelectasis in the lung bases. Other chronic appearing findings. RECOMMENDATIONS:   Unavailable         XR CHEST PORTABLE   Final Result   1. Stable prominence of the cardiac silhouette. Central pulmonary vascular   congestion. Fullness of the bilateral pulmonary aguilar. 2.  Coarse interstitial markings in the lungs.   No new airspace disease,   pleural effusion, or pneumothorax.             ------------------------- NURSING NOTES AND VITALS to auscultation. We will obtain chest x-ray, EKG and laboratory studies as well as a CT PE.    A 12-lead EKG was performed and interpreted by myself. The rate is 84 with normal sinus rhythm and normal axis. There is artifact noted. The CA interval is 190, QRS interval is 92, and QTC interval is 498. T wave inversion in V1. Q-wave in 3. No acute ST elevation or depression. Good R wave progression. This is sinus rhythm with nonspecific abnormality. Patient does have prolonged QT interval.    Laboratory studies show hypokalemia. This will be supplemented. Patient is hyperglycemic without anion gap or acidosis. BNP minimally elevated. Troponin XVIII but stable from prior. Repeat obtained. LFTs normal.  No leukocytosis. Hemoglobin anemic. Covid negative. Urinalysis dirty sample but no evidence of infection. Chest x-ray shows stable prominence and pulmonary vascular congestion. Coarse interstitial markings. No pneumothorax airspace disease or pleural effusion. CT PE shows no pulmonary embolism. Scarring and atelectasis. This is interpreted by radiology. On reevaluation, after the breathing treatments patient has significant increased wheezing. She does acutely appear to be in acute asthma exacerbation. She will be given breathing treatments and IV parenteral prednisone. Patient is requiring oxygen. As she is having an asthma exacerbation, she will require admission. Primary care physician is unassigned. I spoke with sound, Dr. Mireya Butler accepted the patient. This patient's ED course included: a personal history and physicial examination, re-evaluation prior to disposition, cardiac monitoring and continuous pulse oximetry    This patient has remained unchanged during their ED course. Consultations:  Internal medicine    The cardiac monitor revealed sinus rhythm with a heart rate in the 90s as interpreted by me.  The cardiac monitor was ordered secondary to the patient's shortness of breath and to monitor the patient for dysrhythmia. CPT [id number]    Oxygen Saturation Interpretation: 97 % on room air. Normal    Counseling:   I have spoken with the patient and discussed todays results, in addition to providing specific details for the plan of care and counseling regarding the diagnosis and prognosis. Questions are answered at this time and they are agreeable with the plan.     --------------------------------- IMPRESSION AND DISPOSITION ---------------------------------    IMPRESSION  1. Exacerbation of asthma, unspecified asthma severity, unspecified whether persistent        DISPOSITION  Disposition: Admit to telemetry  Patient condition is fair    I, Dr. Gema Boucher, am the primary provider of record    NOTE: This report was transcribed using voice recognition software.  Every effort was made to ensure accuracy; however, inadvertent computerized transcription errors may be present        Gema Boucher MD  03/17/22 [id number]

## 2022-03-17 NOTE — PROGRESS NOTES
Hospitalist Progress Note      SYNOPSIS: Patient admitted on 3/16/2022 for Acute asthma exacerbation. who  has a past medical history of Anxiety, Asthma, CAD (coronary artery disease), Cancer ([facility] [address] Chronic kidney disease, Depression, Diabetes mellitus ([facility] [facility] H/O mammogram, Hx MRSA infection, Hyperlipidemia, Hypertension, Lateral epicondylitis, SERENA on CPAP, and Tubal ligation status.      Patient presented to the emergency department with shortness of breath. Patient has a past medical history of asthma. Patient reports feeling increasingly short of breath over the last month. Her symptoms are worse with exertion and when she lies flat. She has a rescue inhaler and a nebulizer, but was never prescribed nebulized breathing treatments. She has a cough that is nonproductive. Denies fever, chills, chest pain, abdominal pain, nausea, vomiting. Vital signs reveal the patient be hypertensive with a pressure of 185/98. She is afebrile. Currently 97% on 2 L nasal cannula. Chest x-ray was unremarkable. Laboratory studies demonstrate potassium 3.3, glucose 212, proBNP 799, troponin of 20. Patient was given breathing treatments and IV steroids. Medicine was consulted for admission. SUBJECTIVE:  Stable overnight. No other overnight issues reported. Patient seen and examined  Records reviewed. Still admits to SOB  Says she was sent a nebulizer but never any actual medicine for the nebulizer at home      Temp (24hrs), Av.5 °F (36.4 °C), Min:97 °F (36.1 °C), Max:98 °F (36.7 °C)    DIET: No diet orders on file  CODE: Prior  No intake or output data in the 24 hours ending 22 0717    Review of Systems  All bolded are positive; please see HPI  General:  Fever, chills, diaphoresis, fatigue, malaise, night sweats, weight loss  Psychological:  Anxiety, disorientation, hallucinations. ENT:  Epistaxis, headaches, vertigo, visual changes.   Cardiovascular:  Chest pain, irregular heartbeats, palpitations, REVIEWED DAILY    Infusion Medications   Scheduled Medications    carbidopa-levodopa  2 tablet Oral TID    rOPINIRole  1 mg Oral TID     PRN Meds:     Labs:     Recent Labs     03/16/22 1948   WBC 5.4   HGB 10.8*   HCT 35.9          Recent Labs     03/16/22 1948      K 3.3*      CO2 29   BUN 14   CREATININE 0.9   CALCIUM 8.5*       Recent Labs     03/16/22 1948   PROT 6.6   ALKPHOS 98   ALT <5   AST 7   BILITOT 0.3       No results for input(s): INR in the last 72 hours. No results for input(s): [place] in the last 72 hours. Chronic labs:    Lab Results   Component Value Date    CHOL 148 10/25/2021    TRIG 144 10/25/2021    HDL 59 10/25/2021    LDLCALC 60 10/25/2021    TSH 2.74 06/16/2017    INR 1.2 02/09/2021    LABA1C 6.4 (H) 10/24/2021       Radiology: REVIEWED DAILY    +++++++++++++++++++++++++++++++++++++++++++++++++  DO Taylor Lam Physician - [address] [facility], [place]  +++++++++++++++++++++++++++++++++++++++++++++++++  NOTE: This report was transcribed using voice recognition software. Every effort was made to ensure accuracy; however, inadvertent computerized transcription errors may be present.

## 2022-03-17 NOTE — H&P
Hospitalist History & Physical      PCP: Rabia Woodson MD    Date of Service: Pt seen/examined on 3/16/2022     Chief Complaint:  had concerns including Shortness of Breath (pt reports increased sob and generalized weakness. ). History Of Present Illness:    Ms. Sabas Mcdonald, a 66y.o. year old female  who  has a past medical history of Anxiety, Asthma, CAD (coronary artery disease), Cancer ([address] Chronic kidney disease, Depression, Diabetes mellitus ([address] H/O mammogram, Hx MRSA infection, Hyperlipidemia, Hypertension, Lateral epicondylitis, SERENA on CPAP, and Tubal ligation status. Patient presented to the emergency department with shortness of breath. Patient has a past medical history of asthma. Patient reports feeling increasingly short of breath over the last month. Her symptoms are worse with exertion and when she lies flat. She has not been using her inhalers as she ran out of her medications. She has a cough that is nonproductive. Denies fever, chills, chest pain, abdominal pain, nausea, vomiting. Vital signs reveal the patient be hypertensive with a pressure of 185/98. She is afebrile. Currently 97% on 2 L nasal cannula. Chest x-ray was unremarkable. Laboratory studies demonstrate potassium 3.3, glucose 212, proBNP 799, troponin of 20. Patient was given breathing treatments and IV steroids. Medicine was consulted for admission.       Past Medical History:   Diagnosis Date    Anxiety     Asthma     CAD (coronary artery disease) 1/21/2016    Cancer [facility])  breast ca 2006    right lumpectomy    Chronic kidney disease     nephrolithiasis    Depression     Diabetes mellitus ([address]     H/O mammogram     Hx MRSA infection     toe infection january 2012    Hyperlipidemia     Hypertension     Lateral epicondylitis     SERENA on CPAP     Tubal ligation status        Past Surgical History:   Procedure Laterality Date    BREAST LUMPECTOMY      BREAST REDUCTION SURGERY      CARDIAC CATHETERIZATION  4/28/2014    Dr. Sumit Lozano  01/11/2022    Dr. Kira Yuen  7/29/15    ENDOSCOPY, COLON, DIAGNOSTIC  7/19/15    GALLBLADDER SURGERY      LUMBAR LAMINECTOMY      TOE AMPUTATION      TONSILLECTOMY      UPPER GASTROINTESTINAL ENDOSCOPY         Prior to Admission medications    Medication Sig Start Date End Date Taking? Authorizing Provider   rOPINIRole (REQUIP) 1 MG tablet Take 1 tablet by mouth 3 times daily 2/8/22   RASHARD Milner - CNP   carbidopa-levodopa (SINEMET CR)  MG per extended release tablet Take 1 tablet by mouth 3 times daily 1/26/22   RASHARD Milner - BRADLEY   isosorbide mononitrate (IMDUR) 30 MG extended release tablet Take 1 tablet by mouth daily 1/11/22   Neftaly Sofia MD   nitroGLYCERIN (NITROSTAT) 0.4 MG SL tablet up to max of 3 total doses. If no relief after 1 dose, call 911. 1/11/22   Neftaly Sofia MD   busPIRone (BUSPAR) 5 MG tablet Take 1 tablet by mouth 3 times daily 1/11/22   Evelio Sullivan MD   insulin glargine (LANTUS) 100 UNIT/ML injection vial Inject 10 Units into the skin nightly 1/11/22   Evelio Sullivan MD   metoprolol succinate (TOPROL XL) 25 MG extended release tablet Take 1 tablet by mouth daily 1/12/22   Evelio Sullivan MD   amLODIPine (NORVASC) 10 MG tablet Take 1 tablet by mouth daily 1/12/22   Neftaly Sofia MD   montelukast (SINGULAIR) 10 MG tablet Take 10 mg by mouth nightly    Historical Provider, MD   ALPRAZolam Tessa Liu) 0.5 MG tablet Take 0.5 mg by mouth daily as needed for Anxiety.     Historical Provider, MD   meclizine (ANTIVERT) 25 MG tablet Take 25 mg by mouth 3 times daily as needed for Dizziness    Historical Provider, MD   Multiple Vitamin (MULTIVITAMIN) TABS tablet Take 1 tablet by mouth daily 10/26/21   RASHARD Cantu CNP   Cholecalciferol (VITAMIN D) 25 MCG TABS Take 1 tablet by mouth daily 10/26/21   Chino Bello APRN - CNP   doxepin (SINEQUAN) 10 MG capsule Take 10 mg by mouth nightly    Historical Provider, MD   venlafaxine (EFFEXOR XR) 150 MG extended release capsule Take 150 mg by mouth daily    Historical Provider, MD   losartan (COZAAR) 50 MG tablet Take 50 mg by mouth daily    Historical Provider, MD   omeprazole (PRILOSEC) 40 MG delayed release capsule Take 40 mg by mouth daily     Historical Provider, MD   blood glucose test strips (ACCU-CHEK RIGOBERTO PLUS) strip 1 each by In Vitro route 2 times daily Indications: DISP ACCU-CHEK As needed. Historical Provider, MD   lamoTRIgine (LAMICTAL) 200 MG tablet Take 200 mg by mouth daily  10/18/19   Historical Provider, MD   atorvastatin (LIPITOR) 20 MG tablet Take 1 tablet by mouth daily 10/21/19   Shweta Burnett MD   insulin aspart (NOVOLOG FLEXPEN) 100 UNIT/ML injection pen INJECT 0-10 UNITS INTO THE SKIN THREE TIMES DAILY(BEFORE MEALS) SLIDING SCALE (MAX DAILY 30 UNITS)  Patient taking differently: Indications: med. approved 6/1/19-7/29/20 INJECT 0-10 UNITS INTO THE SKIN THREE TIMES DAILY(BEFORE MEALS) SLIDING SCALE (MAX DAILY 30 UNITS) 7/29/19   Shweta Burnett MD   aspirin 81 MG tablet Take 81 mg by mouth nightly    Historical Provider, MD         Allergies:  Ciprofloxacin and Codeine    Social History:    TOBACCO:   reports that she has never smoked. She has never used smokeless tobacco.  ETOH:   reports no history of alcohol use. Family History:    Reviewed in detail and negative for DM, CAD, Cancer, CVA. Positive as follows\"      Problem Relation Age of Onset    Cancer Mother         Lung Ca    Cancer Father         lung Ca    Diabetes Sister     Heart Disease Sister     Seizures Son     Other Brother         sepsis       REVIEW OF SYSTEMS:   Pertinent positives as noted in the HPI. All other systems reviewed and negative.     PHYSICAL EXAM:  BP (!) 185/98   Pulse 82   Temp 97 °F (36.1 °C)   Resp 18   SpO2 97%   General appearance: No apparent distress, appears stated age and cooperative. HEENT: Normal cephalic, atraumatic without obvious deformity. Pupils equal, round, and reactive to light. Extra ocular muscles intact. Conjunctivae/corneas clear. Neck: Supple, with full range of motion. No jugular venous distention. Trachea midline. Respiratory: Scattered wheezes bilaterally  Cardiovascular: Regular rate and rhythm  Abdomen: Soft, nontender, nondistended  Musculoskeletal: No clubbing, cyanosis, edema of bilateral lower extremities. Brisk capillary refill. Skin: Normal skin color. No rashes or lesions. Neurologic:  Neurovascularly intact without any focal sensory/motor deficits. Cranial nerves: II-XII intact, grossly non-focal.  Psychiatric: Alert and oriented, thought content appropriate, normal insight    Reviewed EKG and CXR personally      CBC:   Recent Labs     03/16/22 1948   WBC 5.4   RBC 4.18   HGB 10.8*   HCT 35.9   MCV 85.9   RDW 15.2*        BMP:   Recent Labs     03/16/22 1948      K 3.3*      CO2 29   BUN 14   CREATININE 0.9   MG 2.0     LFT:  Recent Labs     03/16/22 1948   PROT 6.6   ALKPHOS 98   ALT <5   AST 7   BILITOT 0.3     CE:  No results for input(s): EPIS in the last 72 hours. PT/INR: No results for input(s): INR, APTT in the last 72 hours. BNP: No results for input(s): BNP in the last 72 hours.   ESR:   Lab Results   Component Value Date    SEDRATE 15 10/24/2021     CRP:   Lab Results   Component Value Date    CRP 0.7 (H) 10/25/2021     D Dimer:   Lab Results   Component Value Date    DDIMER 247 01/09/2022      Folate and B12:   Lab Results   Component Value Date    HBBVRJNZ51 865 10/24/2021   ,   Lab Results   Component Value Date    FOLATE 18.0 10/24/2021     Lactic Acid:   Lab Results   Component Value Date    LACTA 1.2 11/13/2021     Thyroid Studies:   Lab Results   Component Value Date    TSH 2.74 06/16/2017    J8RATKO 8.7 07/11/2016       Oupatient labs:  Lab Results Component Value Date    CHOL 148 10/25/2021    TRIG 144 10/25/2021    HDL 59 10/25/2021    LDLCALC 60 10/25/2021    TSH 2.74 06/16/2017    INR 1.2 02/09/2021    LABA1C 6.4 (H) 10/24/2021       Urinalysis:    Lab Results   Component Value Date    NITRU Negative 03/16/2022    WBCUA 1-3 03/16/2022    BACTERIA RARE 03/16/2022    RBCUA NONE 03/16/2022    RBCUA NONE 03/25/2013    BLOODU Negative 03/16/2022    SPECGRAV 1.025 03/16/2022    GLUCOSEU Negative 03/16/2022       Imaging:  XR KNEE RIGHT (3 VIEWS)    Result Date: 3/7/2022  EXAMINATION: THREE XRAY VIEWS OF THE RIGHT KNEE; THREE XRAY VIEWS OF THE RIGHT FOOT; 2 XRAY VIEWS OF THE RIGHT TIBIA AND FIBULA 3/7/2022 3:45 pm COMPARISON: None HISTORY: ORDERING SYSTEM PROVIDED HISTORY: fall TECHNOLOGIST PROVIDED HISTORY: Reason for exam:->fall What reading provider will be dictating this exam?->CRC; ORDERING SYSTEM PROVIDED HISTORY: fall, heel pain TECHNOLOGIST PROVIDED HISTORY: Reason for exam:->fall, heel pain What reading provider will be dictating this exam?->CRC FINDINGS: Three views of the right knee reveal narrowing of the medial compartment with peaking of the intercondylar tibial spine. There is sclerosis of the articular surfaces. Osteophyte formation identified within the medial femoral condyle medial aspect of the tibial plateau. No significant joint effusion. Spurring of the patella. Two views of the right tibia and fibula reveal no evidence of acute bony abnormality. The cortex is intact. Ankle mortise is intact. Joint spaces are preserved. No joint effusion. Three views of the right foot reveal calcaneal spurring with degenerative changes seen within the midfoot. There is amputation identified of the 3rd digit. Some degenerative changes involving the 1st metatarsophalangeal joint and remainder of the digits. There is no acute bony abnormality.      Osteopenia of the bony structures with degenerative changes seen within the knee, ankle, and foot with no evidence of acute bony abnormality. XR TIBIA FIBULA RIGHT (2 VIEWS)    Result Date: 3/7/2022  EXAMINATION: THREE XRAY VIEWS OF THE RIGHT KNEE; THREE XRAY VIEWS OF THE RIGHT FOOT; 2 XRAY VIEWS OF THE RIGHT TIBIA AND FIBULA 3/7/2022 3:45 pm COMPARISON: None HISTORY: ORDERING SYSTEM PROVIDED HISTORY: fall TECHNOLOGIST PROVIDED HISTORY: Reason for exam:->fall What reading provider will be dictating this exam?->CRC; ORDERING SYSTEM PROVIDED HISTORY: fall, heel pain TECHNOLOGIST PROVIDED HISTORY: Reason for exam:->fall, heel pain What reading provider will be dictating this exam?->CRC FINDINGS: Three views of the right knee reveal narrowing of the medial compartment with peaking of the intercondylar tibial spine. There is sclerosis of the articular surfaces. Osteophyte formation identified within the medial femoral condyle medial aspect of the tibial plateau. No significant joint effusion. Spurring of the patella. Two views of the right tibia and fibula reveal no evidence of acute bony abnormality. The cortex is intact. Ankle mortise is intact. Joint spaces are preserved. No joint effusion. Three views of the right foot reveal calcaneal spurring with degenerative changes seen within the midfoot. There is amputation identified of the 3rd digit. Some degenerative changes involving the 1st metatarsophalangeal joint and remainder of the digits. There is no acute bony abnormality. Osteopenia of the bony structures with degenerative changes seen within the knee, ankle, and foot with no evidence of acute bony abnormality.      XR FOOT RIGHT (MIN 3 VIEWS)    Result Date: 3/7/2022  EXAMINATION: THREE XRAY VIEWS OF THE RIGHT KNEE; THREE XRAY VIEWS OF THE RIGHT FOOT; 2 XRAY VIEWS OF THE RIGHT TIBIA AND FIBULA 3/7/2022 3:45 pm COMPARISON: None HISTORY: ORDERING SYSTEM PROVIDED HISTORY: fall TECHNOLOGIST PROVIDED HISTORY: Reason for exam:->fall What reading provider will be dictating this exam?->CRC; ORDERING SYSTEM PROVIDED HISTORY: fall, heel pain TECHNOLOGIST PROVIDED HISTORY: Reason for exam:->fall, heel pain What reading provider will be dictating this exam?->CRC FINDINGS: Three views of the right knee reveal narrowing of the medial compartment with peaking of the intercondylar tibial spine. There is sclerosis of the articular surfaces. Osteophyte formation identified within the medial femoral condyle medial aspect of the tibial plateau. No significant joint effusion. Spurring of the patella. Two views of the right tibia and fibula reveal no evidence of acute bony abnormality. The cortex is intact. Ankle mortise is intact. Joint spaces are preserved. No joint effusion. Three views of the right foot reveal calcaneal spurring with degenerative changes seen within the midfoot. There is amputation identified of the 3rd digit. Some degenerative changes involving the 1st metatarsophalangeal joint and remainder of the digits. There is no acute bony abnormality. Osteopenia of the bony structures with degenerative changes seen within the knee, ankle, and foot with no evidence of acute bony abnormality. CT HEAD WO CONTRAST    Result Date: 3/7/2022  EXAMINATION: CT OF THE HEAD WITHOUT CONTRAST  3/7/2022 3:55 pm TECHNIQUE: CT of the head was performed without the administration of intravenous contrast. Dose modulation, iterative reconstruction, and/or weight based adjustment of the mA/kV was utilized to reduce the radiation dose to as low as reasonably achievable. COMPARISON: None. HISTORY: ORDERING SYSTEM PROVIDED HISTORY: fall TECHNOLOGIST PROVIDED HISTORY: Has a \"code stroke\" or \"stroke alert\" been called? ->No Reason for exam:->fall Decision Support Exception - unselect if not a suspected or confirmed emergency medical condition->Emergency Medical Condition (MA) What reading provider will be dictating this exam?->CRC FINDINGS: BRAIN/VENTRICLES: There is no acute intracranial hemorrhage, mass effect or midline shift. No abnormal extra-axial fluid collection. The gray-white differentiation is maintained without evidence of an acute infarct. There is no evidence of hydrocephalus. ORBITS: The visualized portion of the orbits demonstrate no acute abnormality. SINUSES: The visualized paranasal sinuses and mastoid air cells demonstrate no acute abnormality. SOFT TISSUES/SKULL:  No acute abnormality of the visualized skull or soft tissues. No acute intracranial abnormality. CT CERVICAL SPINE WO CONTRAST    Result Date: 3/7/2022  EXAMINATION: CT OF THE CERVICAL SPINE WITHOUT CONTRAST 3/7/2022 3:55 pm TECHNIQUE: CT of the cervical spine was performed without the administration of intravenous contrast. Multiplanar reformatted images are provided for review. Dose modulation, iterative reconstruction, and/or weight based adjustment of the mA/kV was utilized to reduce the radiation dose to as low as reasonably achievable. COMPARISON: None. HISTORY: ORDERING SYSTEM PROVIDED HISTORY: fall TECHNOLOGIST PROVIDED HISTORY: Reason for exam:->fall Decision Support Exception - unselect if not a suspected or confirmed emergency medical condition->Emergency Medical Condition (MA) What reading provider will be dictating this exam?->CRC FINDINGS: BONES/ALIGNMENT: There is no acute fracture or traumatic malalignment. The ring of C1 is intact as is the dense. There is no compression fracture of the cervical spine. No jumped or perched facet is noted. The posterior elements are intact. DEGENERATIVE CHANGES: No significant degenerative changes. SOFT TISSUES: There is no prevertebral soft tissue swelling. No acute abnormality of the cervical spine.      XR CHEST PORTABLE    Result Date: 3/16/2022  EXAMINATION: ONE XRAY VIEW OF THE CHEST 3/16/2022 8:33 pm COMPARISON: Chest series from January 9, 2022 HISTORY: ORDERING SYSTEM PROVIDED HISTORY: chest pain TECHNOLOGIST PROVIDED HISTORY: Reason for exam:->chest pain What reading provider will be dictating this exam?->CRC FINDINGS: Coarse interstitial markings in the lungs. No new airspace disease, pleural effusions, or pneumothoraces. Cardiac silhouette is prominent. Fullness of the bilateral pulmonary aguilar. Central pulmonary vascular congestion. Osseous and thoracic soft tissue structures demonstrate no acute findings. Surgical clips overlie the right axilla. 1.  Stable prominence of the cardiac silhouette. Central pulmonary vascular congestion. Fullness of the bilateral pulmonary aguilar. 2.  Coarse interstitial markings in the lungs. No new airspace disease, pleural effusion, or pneumothorax. CTA PULMONARY W CONTRAST    Result Date: 3/16/2022  EXAMINATION: CTA OF THE CHEST 3/16/2022 9:41 pm TECHNIQUE: CTA of the chest was performed after the administration of intravenous contrast.  Multiplanar reformatted images are provided for review. MIP images are provided for review. Dose modulation, iterative reconstruction, and/or weight based adjustment of the mA/kV was utilized to reduce the radiation dose to as low as reasonably achievable. COMPARISON: Portable chest dated 03/16/2022 at 2025 hours. Correlation with CT dated 01/09/2022. HISTORY: ORDERING SYSTEM PROVIDED HISTORY: dyspnea TECHNOLOGIST PROVIDED HISTORY: Reason for exam:->dyspnea Decision Support Exception - unselect if not a suspected or confirmed emergency medical condition->Emergency Medical Condition (MA) What reading provider will be dictating this exam?->CRC FINDINGS: Pulmonary Arteries: Some images are mildly degraded by patient motion artifact. Allowing for this, no identified pulmonary embolism. The central pulmonary arteries are prominent consistent with pulmonary arterial hypertension. Mediastinum: No aortic dissection. Moderate cardiomegaly. No pericardial effusion. Normal-size lymph nodes without adenopathy by size criteria. Lungs/pleura: No pneumothorax. Minimal emphysematous changes.   Scattered areas of scarring and/or atelectasis in the lung bases. A previously described nodular density in the posterior aspect of the right upper lobe is no longer identified. There is some trace dependent atelectasis or scarring in this region. No dominant mass or new consolidation identified otherwise. Upper Abdomen: Cholecystectomy clips. Thickening of the adrenal glands, likely due to hyperplasia. Visualized portions of the upper abdomen demonstrate no acute abnormality. Soft Tissues/Bones: Degenerative changes are scattered in the spine. No pulmonary embolism. Scattered areas of scarring and/or atelectasis in the lung bases. Other chronic appearing findings. RECOMMENDATIONS: Unavailable       ASSESSMENT:  -Asthma exacerbation  -Exertional dyspnea  -Type 2 diabetes  -Hypertension  -Hyperlipidemia  -Coronary artery disease      PLAN:  -Admit to medicine  -DuoNebs every 4 hours  -Albuterol as needed  -Methylprednisolone 40 mg every 6 hours  -Low-dose correction insulin  -Continue home medications        Diet: No diet orders on file  Code Status: Prior  Surrogate decision maker confirmed with patient:   Extended Emergency Contact Information  Primary Emergency Contact: Narinder Giancarlobonnie [address] Phone: [phone]  Mobile Phone: [phone]  Relation: Child   needed? No  Secondary Emergency Contact: Tanisha Collazo  Mobile Phone: [phone]  Relation: Child   needed? No    DVT Prophylaxis: []Lovenox []Heparin []PCD [] [address] []Encouraged ambulation  Disposition: []Med/Surg [] Intermediate [] ICU/CCU  Admit status: [] Observation [] Inpatient     +++++++++++++++++++++++++++++++++++++++++++++++++  Ceasar Cano, DO  +++++++++++++++++++++++++++++++++++++++++++++++++  NOTE: This report was transcribed using voice recognition software. Every effort was made to ensure accuracy; however, inadvertent computerized transcription errors may be present.

## 2022-03-17 NOTE — PROGRESS NOTES
Dr. Alice Stevens, DO,    Your patient is on a medication that requires a renal and/or weight dose adjustment. Renal/Body Weight Function Assessment:    Date Body Weight IBW  Adjusted BW SCr  CrCl Dialysis status   3/17/2022 227 lb 6.4 oz (103.1 kg) Ideal body weight: 45.5 kg (100 lb 4.9 oz)  Adjusted ideal body weight: 68.6 kg (151 lb 2.3 oz) Serum creatinine: 0.9 mg/dL 03/16/22 1948  Estimated creatinine clearance: 61 mL/min N/a       Pharmacy has dose-adjusted the following medication(s):    Date Previous Order Adjusted Order   3/17/2022 Enoxaparin 40 mg sq daily Enoxaparin 30 mg sq BID       These changes were made per protocol according to the [facility] Renal Dosing Policy/ [facility] Pharmacist Anticoagulant Review. *Please note this dose may need readjusted if your patient's condition changes. Please contact pharmacy with any questions regarding these changes.     Bernardino Silva, [facility]  3/17/2022  11:24 AM

## 2022-03-17 NOTE — ED NOTES
Attempted to call report to floor x2. SBAR faxed with confirmation received. Pt placed in transport.       Christin Contreras RN  03/17/22 [id number]

## 2022-03-18 LAB
ALBUMIN SERPL-MCNC: 4 G/DL (ref 3.5–5.2)
ALP BLD-CCNC: 89 U/L (ref 35–104)
ALT SERPL-CCNC: <5 U/L (ref 0–32)
ANION GAP SERPL CALCULATED.3IONS-SCNC: 8 MMOL/L (ref 7–16)
AST SERPL-CCNC: 7 U/L (ref 0–31)
BILIRUB SERPL-MCNC: 0.5 MG/DL (ref 0–1.2)
BUN BLDV-MCNC: 23 MG/DL (ref 6–23)
CALCIUM SERPL-MCNC: 9.1 MG/DL (ref 8.6–10.2)
CHLORIDE BLD-SCNC: 98 MMOL/L (ref 98–107)
CHOLESTEROL, TOTAL: 146 MG/DL (ref 0–199)
CO2: 29 MMOL/L (ref 22–29)
CREAT SERPL-MCNC: 0.9 MG/DL (ref 0.5–1)
GFR AFRICAN AMERICAN: >60
GFR NON-AFRICAN AMERICAN: >60 ML/MIN/1.73
GLUCOSE BLD-MCNC: 328 MG/DL (ref 74–99)
HCT VFR BLD CALC: 38 % (ref 34–48)
HDLC SERPL-MCNC: 50 MG/DL
HEMOGLOBIN: 11.6 G/DL (ref 11.5–15.5)
LDL CHOLESTEROL CALCULATED: 69 MG/DL (ref 0–99)
MAGNESIUM: 2.2 MG/DL (ref 1.6–2.6)
MCH RBC QN AUTO: 26.1 PG (ref 26–35)
MCHC RBC AUTO-ENTMCNC: 30.5 % (ref 32–34.5)
MCV RBC AUTO: 85.4 FL (ref 80–99.9)
METER GLUCOSE: 217 MG/DL (ref 74–99)
METER GLUCOSE: 290 MG/DL (ref 74–99)
METER GLUCOSE: 319 MG/DL (ref 74–99)
METER GLUCOSE: 326 MG/DL (ref 74–99)
PDW BLD-RTO: 15 FL (ref 11.5–15)
PHOSPHORUS: 2.2 MG/DL (ref 2.5–4.5)
PLATELET # BLD: 263 E9/L (ref 130–450)
PMV BLD AUTO: 9.6 FL (ref 7–12)
POTASSIUM SERPL-SCNC: 4.1 MMOL/L (ref 3.5–5)
RBC # BLD: 4.45 E12/L (ref 3.5–5.5)
SODIUM BLD-SCNC: 135 MMOL/L (ref 132–146)
TOTAL PROTEIN: 6.5 G/DL (ref 6.4–8.3)
TRIGL SERPL-MCNC: 134 MG/DL (ref 0–149)
TSH SERPL DL<=0.05 MIU/L-ACNC: 0.35 UIU/ML (ref 0.27–4.2)
VLDLC SERPL CALC-MCNC: 27 MG/DL
WBC # BLD: 9.9 E9/L (ref 4.5–11.5)

## 2022-03-18 PROCEDURE — 6360000002 HC RX W HCPCS: Performed by: INTERNAL MEDICINE

## 2022-03-18 PROCEDURE — 6360000002 HC RX W HCPCS: Performed by: FAMILY MEDICINE

## 2022-03-18 PROCEDURE — 96376 TX/PRO/DX INJ SAME DRUG ADON: CPT

## 2022-03-18 PROCEDURE — 2580000003 HC RX 258: Performed by: FAMILY MEDICINE

## 2022-03-18 PROCEDURE — 96365 THER/PROPH/DIAG IV INF INIT: CPT

## 2022-03-18 PROCEDURE — 2580000003 HC RX 258: Performed by: INTERNAL MEDICINE

## 2022-03-18 PROCEDURE — 80053 COMPREHEN METABOLIC PANEL: CPT

## 2022-03-18 PROCEDURE — 84100 ASSAY OF PHOSPHORUS: CPT

## 2022-03-18 PROCEDURE — G0378 HOSPITAL OBSERVATION PER HR: HCPCS

## 2022-03-18 PROCEDURE — 94640 AIRWAY INHALATION TREATMENT: CPT

## 2022-03-18 PROCEDURE — 84443 ASSAY THYROID STIM HORMONE: CPT

## 2022-03-18 PROCEDURE — 96366 THER/PROPH/DIAG IV INF ADDON: CPT

## 2022-03-18 PROCEDURE — 36415 COLL VENOUS BLD VENIPUNCTURE: CPT

## 2022-03-18 PROCEDURE — 85027 COMPLETE CBC AUTOMATED: CPT

## 2022-03-18 PROCEDURE — 6370000000 HC RX 637 (ALT 250 FOR IP): Performed by: FAMILY MEDICINE

## 2022-03-18 PROCEDURE — 96372 THER/PROPH/DIAG INJ SC/IM: CPT

## 2022-03-18 PROCEDURE — S5553 INSULIN LONG ACTING 5 U: HCPCS | Performed by: FAMILY MEDICINE

## 2022-03-18 PROCEDURE — 80061 LIPID PANEL: CPT

## 2022-03-18 PROCEDURE — 6370000000 HC RX 637 (ALT 250 FOR IP): Performed by: INTERNAL MEDICINE

## 2022-03-18 PROCEDURE — 2500000003 HC RX 250 WO HCPCS: Performed by: INTERNAL MEDICINE

## 2022-03-18 PROCEDURE — 82962 GLUCOSE BLOOD TEST: CPT

## 2022-03-18 PROCEDURE — 83735 ASSAY OF MAGNESIUM: CPT

## 2022-03-18 RX ORDER — METHYLPREDNISOLONE SODIUM SUCCINATE 40 MG/ML
40 INJECTION, POWDER, LYOPHILIZED, FOR SOLUTION INTRAMUSCULAR; INTRAVENOUS EVERY 12 HOURS
Status: DISCONTINUED | OUTPATIENT
Start: 2022-03-18 | End: 2022-03-19

## 2022-03-18 RX ADMIN — SODIUM CHLORIDE, PRESERVATIVE FREE 10 ML: 5 INJECTION INTRAVENOUS at 20:30

## 2022-03-18 RX ADMIN — LAMOTRIGINE 200 MG: 100 TABLET ORAL at 09:32

## 2022-03-18 RX ADMIN — SODIUM CHLORIDE, PRESERVATIVE FREE 10 ML: 5 INJECTION INTRAVENOUS at 09:35

## 2022-03-18 RX ADMIN — DOXEPIN HYDROCHLORIDE 10 MG: 10 CAPSULE ORAL at 20:25

## 2022-03-18 RX ADMIN — SODIUM PHOSPHATE, MONOBASIC, MONOHYDRATE AND SODIUM PHOSPHATE, DIBASIC, ANHYDROUS 16.5 MMOL: 276; 142 INJECTION, SOLUTION INTRAVENOUS at 20:31

## 2022-03-18 RX ADMIN — METHYLPREDNISOLONE SODIUM SUCCINATE 40 MG: 40 INJECTION, POWDER, FOR SOLUTION INTRAMUSCULAR; INTRAVENOUS at 18:21

## 2022-03-18 RX ADMIN — CARBIDOPA AND LEVODOPA 2 TABLET: 25; 100 TABLET, EXTENDED RELEASE ORAL at 09:34

## 2022-03-18 RX ADMIN — PANTOPRAZOLE SODIUM 40 MG: 40 TABLET, DELAYED RELEASE ORAL at 06:29

## 2022-03-18 RX ADMIN — BUSPIRONE HYDROCHLORIDE 5 MG: 5 TABLET ORAL at 09:32

## 2022-03-18 RX ADMIN — ENOXAPARIN SODIUM 30 MG: 100 INJECTION SUBCUTANEOUS at 09:35

## 2022-03-18 RX ADMIN — ENOXAPARIN SODIUM 30 MG: 100 INJECTION SUBCUTANEOUS at 20:26

## 2022-03-18 RX ADMIN — INSULIN LISPRO 3 UNITS: 100 INJECTION, SOLUTION INTRAVENOUS; SUBCUTANEOUS at 20:25

## 2022-03-18 RX ADMIN — VENLAFAXINE HYDROCHLORIDE 150 MG: 150 CAPSULE, EXTENDED RELEASE ORAL at 09:32

## 2022-03-18 RX ADMIN — ASPIRIN 81 MG 81 MG: 81 TABLET ORAL at 20:25

## 2022-03-18 RX ADMIN — INSULIN LISPRO 8 UNITS: 100 INJECTION, SOLUTION INTRAVENOUS; SUBCUTANEOUS at 09:36

## 2022-03-18 RX ADMIN — CARBIDOPA AND LEVODOPA 2 TABLET: 25; 100 TABLET, EXTENDED RELEASE ORAL at 20:25

## 2022-03-18 RX ADMIN — CARBIDOPA AND LEVODOPA 2 TABLET: 25; 100 TABLET, EXTENDED RELEASE ORAL at 14:17

## 2022-03-18 RX ADMIN — BUSPIRONE HYDROCHLORIDE 5 MG: 5 TABLET ORAL at 14:16

## 2022-03-18 RX ADMIN — BUSPIRONE HYDROCHLORIDE 5 MG: 5 TABLET ORAL at 20:25

## 2022-03-18 RX ADMIN — ALPRAZOLAM 0.5 MG: 0.25 TABLET ORAL at 14:17

## 2022-03-18 RX ADMIN — ROPINIROLE HYDROCHLORIDE 1 MG: 1 TABLET, FILM COATED ORAL at 09:34

## 2022-03-18 RX ADMIN — INSULIN LISPRO 8 UNITS: 100 INJECTION, SOLUTION INTRAVENOUS; SUBCUTANEOUS at 12:08

## 2022-03-18 RX ADMIN — IPRATROPIUM BROMIDE AND ALBUTEROL SULFATE 1 AMPULE: .5; 2.5 SOLUTION RESPIRATORY (INHALATION) at 15:28

## 2022-03-18 RX ADMIN — METHYLPREDNISOLONE SODIUM SUCCINATE 40 MG: 40 INJECTION, POWDER, FOR SOLUTION INTRAMUSCULAR; INTRAVENOUS at 06:29

## 2022-03-18 RX ADMIN — AMLODIPINE BESYLATE 10 MG: 10 TABLET ORAL at 09:33

## 2022-03-18 RX ADMIN — ROPINIROLE HYDROCHLORIDE 1 MG: 1 TABLET, FILM COATED ORAL at 14:17

## 2022-03-18 RX ADMIN — ROPINIROLE HYDROCHLORIDE 1 MG: 1 TABLET, FILM COATED ORAL at 20:26

## 2022-03-18 RX ADMIN — INSULIN GLARGINE-YFGN 26 UNITS: 100 INJECTION, SOLUTION SUBCUTANEOUS at 20:25

## 2022-03-18 RX ADMIN — INSULIN LISPRO 4 UNITS: 100 INJECTION, SOLUTION INTRAVENOUS; SUBCUTANEOUS at 18:20

## 2022-03-18 RX ADMIN — LOSARTAN POTASSIUM 50 MG: 50 TABLET, FILM COATED ORAL at 09:33

## 2022-03-18 RX ADMIN — IPRATROPIUM BROMIDE AND ALBUTEROL SULFATE 1 AMPULE: .5; 2.5 SOLUTION RESPIRATORY (INHALATION) at 08:44

## 2022-03-18 RX ADMIN — ISOSORBIDE MONONITRATE 30 MG: 30 TABLET, EXTENDED RELEASE ORAL at 09:33

## 2022-03-18 RX ADMIN — ATORVASTATIN CALCIUM 20 MG: 20 TABLET, FILM COATED ORAL at 09:33

## 2022-03-18 RX ADMIN — MONTELUKAST 10 MG: 10 TABLET, FILM COATED ORAL at 20:25

## 2022-03-18 RX ADMIN — METOPROLOL SUCCINATE 25 MG: 25 TABLET, EXTENDED RELEASE ORAL at 09:33

## 2022-03-18 ASSESSMENT — PAIN SCALES - GENERAL
PAINLEVEL_OUTOF10: 0
PAINLEVEL_OUTOF10: 0

## 2022-03-18 NOTE — PROGRESS NOTES
Hospitalist Progress Note      SYNOPSIS: Patient admitted on 3/16/2022 for Acute asthma exacerbation. who  has a past medical history of Anxiety, Asthma, CAD (coronary artery disease), Cancer ([facility] [address] Chronic kidney disease, Depression, Diabetes mellitus ([facility] [address] H/O mammogram, Hx MRSA infection, Hyperlipidemia, Hypertension, Lateral epicondylitis, SERENA on CPAP, and Tubal ligation status.      Patient presented to the emergency department with shortness of breath. Patient has a past medical history of asthma. Patient reports feeling increasingly short of breath over the last month. Her symptoms are worse with exertion and when she lies flat. She has a rescue inhaler and a nebulizer, but was never prescribed nebulized breathing treatments. She has a cough that is nonproductive. Denies fever, chills, chest pain, abdominal pain, nausea, vomiting. Vital signs reveal the patient be hypertensive with a pressure of 185/98. She is afebrile. Currently 97% on 2 L nasal cannula. Chest x-ray was unremarkable. Laboratory studies demonstrate potassium 3.3, glucose 212, proBNP 799, troponin of 20. Patient was given breathing treatments and IV steroids. Medicine was consulted for admission. SUBJECTIVE:  Stable overnight. No other overnight issues reported. Patient seen and examined  Records reviewed. Says she is feeling slightly better today  Pulm consulted         Temp (24hrs), Av.2 °F (36.2 °C), Min:97.2 °F (36.2 °C), Max:97.2 °F (36.2 °C)    DIET: ADULT DIET; Regular; 3 carb choices (45 gm/meal)  CODE: Full Code    Intake/Output Summary (Last 24 hours) at 3/18/2022 0853  Last data filed at 3/17/2022 1350  Gross per 24 hour   Intake 480 ml   Output --   Net 480 ml       Review of Systems  All bolded are positive; please see HPI  General:  Fever, chills, diaphoresis, fatigue, malaise, night sweats, weight loss  Psychological:  Anxiety, disorientation, hallucinations.   ENT:  Epistaxis, headaches, vertigo, visual changes. Cardiovascular:  Chest pain, irregular heartbeats, palpitations, paroxysmal nocturnal dyspnea. Respiratory:  Shortness of breath, coughing, sputum production, hemoptysis, wheezing, orthopnea. Gastrointestinal:  Nausea, vomiting, diarrhea, heartburn, constipation, abdominal pain, hematemesis, hematochezia, melena, acholic stools  Genito-Urinary:  Dysuria, urgency, frequency, hematuria  Musculoskeletal:  Joint pain, joint stiffness, joint swelling, muscle pain  Neurology:  Headache, focal neurological deficits, weakness, numbness, paresthesia  Derm:  Rashes, ulcers, excoriations, bruising  Extremities:  Decreased ROM, peripheral edema, mottling      OBJECTIVE:    /71   Pulse 85   Temp 97.2 °F (36.2 °C) (Temporal)   Resp 20   Ht 5' (1.524 m)   Wt 227 lb 6.4 oz (103.1 kg)   SpO2 92%   BMI 44.41 kg/m²     General appearance:  awake, alert, and oriented to person, place, time, and purpose; appears stated age and cooperative; no apparent distress no labored breathing  HEENT:  Conjunctivae/corneas clear. Neck: Supple. No jugular venous distention. Respiratory: symmetrical;diminished bilaterally; no wheezes; no rhonchi; no rales  Cardiovascular: rhythm regular; rate controlled; no murmurs  Abdomen: Soft, nontender, nondistended  Extremities:  peripheral pulses present; no peripheral edema; no ulcers  Musculoskeletal: No clubbing, cyanosis, no bilateral lower extremity edema. Brisk capillary refill. Skin:  No rashes  on visible skin  Neurologic: awake, alert and following commands     ASSESSMENT and PLAN:  · Asthma exacerbation- IV steroids, zak. Says she was sent a nebulizer but never any actual medicine for the nebulizer at home. Respiratory panel negative. Now on room air. Wean steroids. Pulm following  · Exertional dyspnea  · Elevated troponin- 18>20>14. Repeat troponin. Denies CP. · Elevated proBNP-   · Type 2 diabetes- Hemoglobin A1c 6.9.  Lantus and SSI.  · Hypertension- reorder home medications  · Hyperlipidemia- statin  · Coronary artery disease- On aspirin, lipitor, imdur, metoprolol. · Parkinson's disease- on sinemet  · Prolonged QTc  · Obesity class III- BMI 44         Medications:  REVIEWED DAILY    Infusion Medications    sodium chloride      dextrose       Scheduled Medications    methylPREDNISolone  40 mg IntraVENous Q12H    amLODIPine  10 mg Oral Daily    aspirin  81 mg Oral Nightly    atorvastatin  20 mg Oral Daily    busPIRone  5 mg Oral TID    carbidopa-levodopa  2 tablet Oral TID    doxepin  10 mg Oral Nightly    isosorbide mononitrate  30 mg Oral Daily    lamoTRIgine  200 mg Oral Daily    losartan  50 mg Oral Daily    metoprolol succinate  25 mg Oral Daily    montelukast  10 mg Oral Nightly    pantoprazole  40 mg Oral QAM AC    rOPINIRole  1 mg Oral TID    venlafaxine  150 mg Oral Daily    sodium chloride flush  10 mL IntraVENous 2 times per day    ipratropium-albuterol  1 ampule Inhalation Q4H WA    insulin glargine  0.25 Units/kg SubCUTAneous Nightly    enoxaparin  30 mg SubCUTAneous BID    insulin lispro  0-12 Units SubCUTAneous TID WC    insulin lispro  0-6 Units SubCUTAneous Nightly     PRN Meds:     Labs:     Recent Labs     03/16/22 1948 03/18/22  0457   WBC 5.4 9.9   HGB 10.8* 11.6   HCT 35.9 38.0    263       Recent Labs     03/16/22 1948 03/18/22  0457    135   K 3.3* 4.1    98   CO2 29 29   BUN 14 23   CREATININE 0.9 0.9   CALCIUM 8.5* 9.1   PHOS  --  2.2*       Recent Labs     03/16/22 1948 03/18/22  0457   PROT 6.6 6.5   ALKPHOS 98 89   ALT <5 <5   AST 7 7   BILITOT 0.3 0.5       No results for input(s): INR in the last 72 hours. No results for input(s): Chyrel Lions in the last 72 hours.     Chronic labs:    Lab Results   Component Value Date    CHOL 146 03/18/2022    TRIG 134 03/18/2022    HDL 50 03/18/2022    LDLCALC 69 03/18/2022    TSH 0.347 03/18/2022    INR 1.2 02/09/2021 LABA1C 6.9 (H) 03/16/2022       Radiology: REVIEWED DAILY    +++++++++++++++++++++++++++++++++++++++++++++++++  Arline Osgood, [address]  +++++++++++++++++++++++++++++++++++++++++++++++++  NOTE: This report was transcribed using voice recognition software. Every effort was made to ensure accuracy; however, inadvertent computerized transcription errors may be present.

## 2022-03-18 NOTE — CARE COORDINATION
3/18 Care Coordination: Pt presenting to the ED for shortness of breath. The patient states that she has past medical history of asthma, diabetes and hypertension as well as coronary artery disease and breast cancer status post radiation. Started IV Steroids,On RA. In as OBS. PTA  pt lives alone in a 3rd floor apartment. Has elevator access. She does not drive. HAs a friend that transports. Independent with ADLs. Pt has a rollator, quad cane, glucometer, nebulizer, bp cuff , emergency button and pulse ox. Discharge plan is to return home. States her daughter is coming if discharged she will transport. CM/SW will continue to follow for discharge planning.    Miguel WONG,RN-CV-BC  [phone]

## 2022-03-18 NOTE — CONSULTS
Pulmonary [facility]                             Pulmonary Consult/Progress Note :          Patient: Sabas Mcdonald  MRN: [id number]  : 1950      Date of Admission: .3/16/2022  6:57 PM    Consulting Physician:Dr Durbin Single             Reason for Consultation:Asthma exacerbation   CC : SOB ,wheeizng   HPI:   Sabas Mcdonald is a 66y.o. year old with no smoking history ,she is Known Asthma and she is known to SERENA ,on CPAP     She use albuterol ,she does not follow with Pulmonary     Patient presented to the emergency department with shortness of breath. She states she has increasingly short of breath over the last month. Her symptoms are worse with exertion and when she lies flat. She has not been using her inhalers as she ran out of her medications. She has a cough that is nonproductive. Denies fever, chills, chest pain, abdominal pain, nausea, vomiting. Vital signs reveal the patient be hypertensive with a pressure of 185/98. She is afebrile. Currently 97% on 2 L nasal cannula. Chest x-ray was unremarkable. Laboratory studies demonstrate potassium 3.3, glucose 212, proBNP 799, troponin of 20.   Patient was given breathing treatments and IV steroids  PAST MEDICAL HISTORY:   Past Medical History:   Diagnosis Date    Anxiety     Asthma     CAD (coronary artery disease) 2016    Cancer [facility])  breast ca 2006    right lumpectomy    Chronic kidney disease     nephrolithiasis    Depression     Diabetes mellitus ([facility] [address]     H/O mammogram     Hx MRSA infection     toe infection 2012    Hyperlipidemia     Hypertension     Lateral epicondylitis     SERENA on CPAP     Tubal ligation status        PAST SURGICAL HISTORY:   Past Surgical History:   Procedure Laterality Date    BREAST LUMPECTOMY      BREAST REDUCTION SURGERY      CARDIAC CATHETERIZATION  2014    Dr. Sujatha Bruce  2022    Dr. Horacio Banks  COLONOSCOPY  7/29/15    ENDOSCOPY, COLON, DIAGNOSTIC  7/19/15    GALLBLADDER SURGERY      LUMBAR LAMINECTOMY      TOE AMPUTATION      TONSILLECTOMY      UPPER GASTROINTESTINAL ENDOSCOPY         FAMILY HISTORY:   Family History   Problem Relation Age of Onset    Cancer Mother         Lung Ca    Cancer Father         lung Ca    Diabetes Sister     Heart Disease Sister     Seizures Son     Other Brother         sepsis       SOCIAL HISTORY:   Social History     Socioeconomic History    Marital status:      Spouse name: Not on file    Number of children: Not on file    Years of education: Not on file    Highest education level: Not on file   Occupational History    Not on file   Tobacco Use    Smoking status: Never Smoker    Smokeless tobacco: Never Used   Vaping Use    Vaping Use: Never used   Substance and Sexual Activity    Alcohol use: No    Drug use: No    Sexual activity: Never   Other Topics Concern    Not on file   Social History Narrative    Not on file     Social Determinants of Health     Financial Resource Strain:     Difficulty of Paying Living Expenses: Not on file   Food Insecurity:     Worried About Running Out of Food in the Last Year: Not on file    Danielito of Food in the Last Year: Not on file   Transportation Needs:     Lack of Transportation (Medical): Not on file    Lack of Transportation (Non-Medical):  Not on file   Physical Activity:     Days of Exercise per Week: Not on file    Minutes of Exercise per Session: Not on file   Stress:     Feeling of Stress : Not on file   Social Connections:     Frequency of Communication with Friends and Family: Not on file    Frequency of Social Gatherings with Friends and Family: Not on file    Attends Yarsani Services: Not on file    Active Member of Clubs or Organizations: Not on file    Attends Club or Organization Meetings: Not on file    Marital Status: Not on file   Intimate Partner Violence:     Fear of Current or Ex-Partner: Not on file    Emotionally Abused: Not on file    Physically Abused: Not on file    Sexually Abused: Not on file   Housing Stability:     Unable to Pay for Housing in the Last Year: Not on file    Number of Places Lived in the Last Year: Not on file    Unstable Housing in the Last Year: Not on file     Social History     Tobacco Use   Smoking Status Never Smoker   Smokeless Tobacco Never Used     Social History     Substance and Sexual Activity   Alcohol Use No     Social History     Substance and Sexual Activity   Drug Use No       OCCUPATIONAL HISTORY:            HOME MEDICATIONS:  Prior to Admission medications    Medication Sig Start Date End Date Taking? Authorizing Provider   rOPINIRole (REQUIP) 1 MG tablet Take 1 tablet by mouth 3 times daily 2/8/22   RASHARD Barroso CNP   carbidopa-levodopa (SINEMET CR)  MG per extended release tablet Take 1 tablet by mouth 3 times daily 1/26/22   RASHARD Barroso CNP   isosorbide mononitrate (IMDUR) 30 MG extended release tablet Take 1 tablet by mouth daily 1/11/22   Neftaly Pak MD   nitroGLYCERIN (NITROSTAT) 0.4 MG SL tablet up to max of 3 total doses. If no relief after 1 dose, call 911. Patient not taking: Reported on 3/17/2022 1/11/22   Alesha Bocanegra MD   busPIRone (BUSPAR) 5 MG tablet Take 1 tablet by mouth 3 times daily 1/11/22   Alesha Bocanegra MD   insulin glargine (LANTUS) 100 UNIT/ML injection vial Inject 10 Units into the skin nightly 1/11/22   Alesha Bocanegra MD   metoprolol succinate (TOPROL XL) 25 MG extended release tablet Take 1 tablet by mouth daily 1/12/22   Alesha Bocanegra MD   amLODIPine (NORVASC) 10 MG tablet Take 1 tablet by mouth daily 1/12/22   Neftaly Pak MD   montelukast (SINGULAIR) 10 MG tablet Take 10 mg by mouth nightly    Historical Provider, MD   ALPRAZolam Olvinla Sans) 0.5 MG tablet Take 0.5 mg by mouth daily as needed for Anxiety.     Historical Provider, MD meclizine (ANTIVERT) 25 MG tablet Take 25 mg by mouth 3 times daily as needed for Dizziness    Historical Provider, MD   Multiple Vitamin (MULTIVITAMIN) TABS tablet Take 1 tablet by mouth daily  Patient not taking: Reported on 3/17/2022 10/26/21   RASHARD Rayo CNP   Cholecalciferol (VITAMIN D) 25 MCG TABS Take 1 tablet by mouth daily 10/26/21   RASHARD Rayo CNP   doxepin (SINEQUAN) 10 MG capsule Take 10 mg by mouth nightly    Historical Provider, MD   venlafaxine (EFFEXOR XR) 150 MG extended release capsule Take 150 mg by mouth daily    Historical Provider, MD   losartan (COZAAR) 50 MG tablet Take 50 mg by mouth daily    Historical Provider, MD   omeprazole (PRILOSEC) 40 MG delayed release capsule Take 40 mg by mouth daily     Historical Provider, MD   blood glucose test strips (ACCU-CHEK RIGOBERTO PLUS) strip 1 each by In Vitro route 2 times daily Indications: DISP ACCU-CHEK As needed. Historical Provider, MD   lamoTRIgine (LAMICTAL) 200 MG tablet Take 200 mg by mouth daily  10/18/19   Historical Provider, MD   atorvastatin (LIPITOR) 20 MG tablet Take 1 tablet by mouth daily 10/21/19   Manisha Beyer MD   insulin aspart (NOVOLOG FLEXPEN) 100 UNIT/ML injection pen INJECT 0-10 UNITS INTO THE SKIN THREE TIMES DAILY(BEFORE MEALS) SLIDING SCALE (MAX DAILY 30 UNITS)  Patient taking differently: Indications: med.  approved 6/1/19-7/29/20 INJECT 0-10 UNITS INTO THE SKIN THREE TIMES DAILY(BEFORE MEALS) SLIDING SCALE (MAX DAILY 30 UNITS) 7/29/19   Manisha Beyer MD   aspirin 81 MG tablet Take 81 mg by mouth nightly    Historical Provider, MD       CURRENT MEDICATIONS:  Current Facility-Administered Medications: ALPRAZolam (XANAX) tablet 0.5 mg, 0.5 mg, Oral, Daily PRN  amLODIPine (NORVASC) tablet 10 mg, 10 mg, Oral, Daily  aspirin chewable tablet 81 mg, 81 mg, Oral, Nightly  atorvastatin (LIPITOR) tablet 20 mg, 20 mg, Oral, Daily  busPIRone (BUSPAR) tablet 5 mg, 5 mg, Oral, TID  carbidopa-levodopa (SINEMET CR)  MG per extended release tablet 2 tablet, 2 tablet, Oral, TID  doxepin (SINEQUAN) capsule 10 mg, 10 mg, Oral, Nightly  isosorbide mononitrate (IMDUR) extended release tablet 30 mg, 30 mg, Oral, Daily  lamoTRIgine (LAMICTAL) tablet 200 mg, 200 mg, Oral, Daily  losartan (COZAAR) tablet 50 mg, 50 mg, Oral, Daily  metoprolol succinate (TOPROL XL) extended release tablet 25 mg, 25 mg, Oral, Daily  montelukast (SINGULAIR) tablet 10 mg, 10 mg, Oral, Nightly  pantoprazole (PROTONIX) tablet 40 mg, 40 mg, Oral, QAM AC  rOPINIRole (REQUIP) tablet 1 mg, 1 mg, Oral, TID  venlafaxine (EFFEXOR XR) extended release capsule 150 mg, 150 mg, Oral, Daily  sodium chloride flush 0.9 % injection 10 mL, 10 mL, IntraVENous, 2 times per day  sodium chloride flush 0.9 % injection 10 mL, 10 mL, IntraVENous, PRN  0.9 % sodium chloride infusion, 25 mL, IntraVENous, PRN  polyethylene glycol (GLYCOLAX) packet 17 g, 17 g, Oral, Daily PRN  acetaminophen (TYLENOL) tablet 650 mg, 650 mg, Oral, Q6H PRN **OR** acetaminophen (TYLENOL) suppository 650 mg, 650 mg, Rectal, Q6H PRN  glucose (GLUTOSE) 40 % oral gel 15 g, 15 g, Oral, PRN  dextrose 50 % IV solution, 12.5 g, IntraVENous, PRN  glucagon (rDNA) injection 1 mg, 1 mg, IntraMUSCular, PRN  dextrose 5 % solution, 100 mL/hr, IntraVENous, PRN  ipratropium-albuterol (DUONEB) nebulizer solution 1 ampule, 1 ampule, Inhalation, Q4H WA  albuterol (PROVENTIL) nebulizer solution 2.5 mg, 2.5 mg, Nebulization, Q6H PRN  methylPREDNISolone sodium (SOLU-MEDROL) injection 40 mg, 40 mg, IntraVENous, Q6H  insulin glargine-yfgn (SEMGLEE-YFGN) injection vial 26 Units, 0.25 Units/kg, SubCUTAneous, Nightly  enoxaparin (LOVENOX) injection 30 mg, 30 mg, SubCUTAneous, BID  insulin lispro (HUMALOG) injection vial 0-12 Units, 0-12 Units, SubCUTAneous, TID WC  insulin lispro (HUMALOG) injection vial 0-6 Units, 0-6 Units, SubCUTAneous, Nightly  trimethobenzamide (TIGAN) injection 200 mg, 200 mg, IntraMUSCular, Q6H PRN  hydrALAZINE (APRESOLINE) injection 10 mg, 10 mg, IntraVENous, Q6H PRN  potassium chloride (KLOR-CON M) extended release tablet 40 mEq, 40 mEq, Oral, PRN **OR** potassium bicarb-citric acid (EFFER-K) effervescent tablet 40 mEq, 40 mEq, Oral, PRN **OR** potassium chloride 10 mEq/100 mL IVPB (Peripheral Line), 10 mEq, IntraVENous, PRN    IV MEDICATIONS:   sodium chloride      dextrose         ALLERGIES:  Allergies   Allergen Reactions    Ciprofloxacin Nausea And Vomiting    Codeine Itching     Creepy crawly \" feelings       REVIEW OF SYSTEMS:  General ROS:  No weight loss ,no fatigue     ENT ROS:   No Sore throat ,no lymphoadenopathy,no nasal stuffiness     Hematological and Lymphatic ROS:   No ecchymosis ,no tendency to bleed  Respiratory ROS:   SOB ,wheezing   Cardiovascular ROS:   No CP,No Palpitation   Gastrointestinal ROS:   No Gi bleed,no nausea or vomiting      - Musculoskeletal ROS:      - no joint swelling ,no joint pain   Neurological ROS:     -no weakness or numbness    Dermatological ROS:   No skin rash ,no urticaria     PHYSICAL EXAMINATION:     VITAL SIGNS:  BP (!) 157/77   Pulse 98   Temp 96.9 °F (36.1 °C) (Temporal)   Resp 24   Ht 5' (1.524 m)   Wt 227 lb 6.4 oz (103.1 kg)   SpO2 92%   BMI 44.41 kg/m²   Wt Readings from Last 3 Encounters:   03/17/22 227 lb 6.4 oz (103.1 kg)   01/10/22 219 lb 9.6 oz (99.6 kg)   11/13/21 200 lb (90.7 kg)     Temp Readings from Last 3 Encounters:   03/17/22 96.9 °F (36.1 °C) (Temporal)   03/07/22 98 °F (36.7 °C) (Oral)   01/11/22 99.1 °F (37.3 °C)     TMAX:  BP Readings from Last 3 Encounters:   03/17/22 (!) 157/77   03/07/22 (!) 152/72   01/11/22 (!) 147/79     Pulse Readings from Last 3 Encounters:   03/17/22 98   03/07/22 79   01/11/22 78           INTAKE/OUTPUTS:  I/O last 3 completed shifts:   In: 480 [P.O.:480]  Out: -     Intake/Output Summary (Last 24 hours) at 3/17/2022 2034  Last data filed at 3/17/2022 1350  Gross per 24 hour   Intake 480 ml   Output --   Net 480 ml       General Appearance: alert and oriented to person, place and time, well-developed and   well-nourished, in no acute distress   Eyes: pupils equal, round, and reactive to light, extraocular eye movements intact, conjunctivae normal and sclera anicteric   Neck: neck supple and non tender without mass, no thyromegaly, no thyroid nodules and no cervical adenopathy   Pulmonary/Chest:wheezing   Cardiovascular: normal rate, regular rhythm, normal S1 and S2, no murmurs, rubs, clicks or gallops, distal pulses intact, no carotid bruits, no murmurs, no gallops, no carotid bruits and no JVD   Abdomen: obese, soft, non-tender, non-distended, normal bowel sounds, no masses or organomegaly   Extremities:edema No  Musculoskeletal: normal range of motion, no joint swelling, deformity or tenderness   Neurologic: reflexes normal and symmetric, no cranial nerve deficit noted    LABS/IMAGING:    CBC:  Lab Results   Component Value Date    WBC 5.4 03/16/2022    HGB 10.8 (L) 03/16/2022    HCT 35.9 03/16/2022    MCV 85.9 03/16/2022     03/16/2022    LYMPHOPCT 21.4 03/16/2022    RBC 4.18 03/16/2022    MCH 25.8 (L) 03/16/2022    MCHC 30.1 (L) 03/16/2022    RDW 15.2 (H) 03/16/2022    NEUTOPHILPCT 69.6 03/16/2022    MONOPCT 5.4 03/16/2022    EOSPCT 1 06/16/2017    BASOPCT 0.6 03/16/2022    NEUTROABS 3.77 03/16/2022    LYMPHSABS 1.16 (L) 03/16/2022    MONOSABS 0.29 03/16/2022    EOSABS 0.08 03/16/2022    BASOSABS 0.03 03/16/2022       Recent Labs     03/16/22 1948   WBC 5.4   HGB 10.8*   HCT 35.9   MCV 85.9          BMP:   Recent Labs     03/16/22 1948      K 3.3*      CO2 29   BUN 14   CREATININE 0.9       MG:   Lab Results   Component Value Date    MG 2.0 03/16/2022     Ca/Phos:   Lab Results   Component Value Date    CALCIUM 8.5 (L) 03/16/2022     Amylase:   Lab Results   Component Value Date    AMYLASE 19 07/15/2015     Lipase:   Lab Results   Component Value Date    LIPASE 17 11/13/2021     LIVER PROFILE:   Recent Labs     03/16/22 1948   AST 7   ALT <5   BILITOT 0.3   ALKPHOS 98       PT/INR: No results for input(s): PROTIME, INR in the last 72 hours. APTT: No results for input(s): APTT in the last 72 hours. Cardiac Enzymes:  Lab Results   Component Value Date    CKTOTAL 32 04/25/2014    CKMB 2.1 04/25/2014    TROPONINI <0.01 10/26/2020               MICROBIOLOGY:      Ct shows atelectasis     PROBLEM LIST:  Patient Active Problem List   Diagnosis    Depression with anxiety    Osteoporosis    Asthma    Hyperlipidemia    Mitral regurgitation    Obstructive sleep apnea syndrome    Psoriasis    Diabetes mellitus ([facility] [address]    Parkinson's disease ([facility] [address]    Primary hypertension    Microalbuminuria    Class 3 severe obesity due to excess calories with serious comorbidity and body mass index (BMI) of 40.0 to 44.9 in adult (HCC)    RLS (restless legs syndrome)    Generalized weakness    Inability to walk    Hypothyroidism    Chest pain    Acute asthma exacerbation    Asthma exacerbation, mild               ASSESSMENT:  1.) Acute Asthma exacerbation  2. )SERENA        PLAN:  *-Agree with IV steroids and BD and ICS    *-need PFT as IOP  *-wean to PO steroids over the next 2 days   *-need add Symbicort or dulera on discharge  *_keep albuterol as needed  * need eosinophilic count and res allergy profile   *-wean prednisone over 10 days   CPAP at night ,advise to get home machine  DVT px           Thank you very much for allowing me to participate in the care of this pleasant patient , should you have any questions ,please do not hesitate to contact me      Richard Guzman MD,[facility]  Pulmonary&Critical Care Medicine   Director of [address] [place] [address] Director of [address] [place] [address]    Estelle Roberts                      NOTE: This report was transcribed using voice recognition software.  Every effort was made to ensure accuracy; however, inadvertent computerized transcription errors may be present.

## 2022-03-19 VITALS
BODY MASS INDEX: 44.65 KG/M2 | OXYGEN SATURATION: 99 % | HEIGHT: 60 IN | RESPIRATION RATE: 18 BRPM | DIASTOLIC BLOOD PRESSURE: 90 MMHG | WEIGHT: 227.4 LBS | TEMPERATURE: 96.7 F | SYSTOLIC BLOOD PRESSURE: 176 MMHG | HEART RATE: 84 BPM

## 2022-03-19 LAB
ALBUMIN SERPL-MCNC: 3.8 G/DL (ref 3.5–5.2)
ALP BLD-CCNC: 83 U/L (ref 35–104)
ALT SERPL-CCNC: <5 U/L (ref 0–32)
ANION GAP SERPL CALCULATED.3IONS-SCNC: 11 MMOL/L (ref 7–16)
AST SERPL-CCNC: 7 U/L (ref 0–31)
BILIRUB SERPL-MCNC: 0.4 MG/DL (ref 0–1.2)
BUN BLDV-MCNC: 26 MG/DL (ref 6–23)
CALCIUM SERPL-MCNC: 8.4 MG/DL (ref 8.6–10.2)
CHLORIDE BLD-SCNC: 97 MMOL/L (ref 98–107)
CO2: 29 MMOL/L (ref 22–29)
CREAT SERPL-MCNC: 0.9 MG/DL (ref 0.5–1)
GFR AFRICAN AMERICAN: >60
GFR NON-AFRICAN AMERICAN: >60 ML/MIN/1.73
GLUCOSE BLD-MCNC: 349 MG/DL (ref 74–99)
HCT VFR BLD CALC: 36.9 % (ref 34–48)
HEMOGLOBIN: 11.2 G/DL (ref 11.5–15.5)
MCH RBC QN AUTO: 25.9 PG (ref 26–35)
MCHC RBC AUTO-ENTMCNC: 30.4 % (ref 32–34.5)
MCV RBC AUTO: 85.2 FL (ref 80–99.9)
METER GLUCOSE: 300 MG/DL (ref 74–99)
METER GLUCOSE: 339 MG/DL (ref 74–99)
PDW BLD-RTO: 14.8 FL (ref 11.5–15)
PHOSPHORUS: 2.7 MG/DL (ref 2.5–4.5)
PLATELET # BLD: 235 E9/L (ref 130–450)
PMV BLD AUTO: 9.2 FL (ref 7–12)
POTASSIUM SERPL-SCNC: 4 MMOL/L (ref 3.5–5)
RBC # BLD: 4.33 E12/L (ref 3.5–5.5)
SODIUM BLD-SCNC: 137 MMOL/L (ref 132–146)
TOTAL PROTEIN: 6.3 G/DL (ref 6.4–8.3)
WBC # BLD: 8.8 E9/L (ref 4.5–11.5)

## 2022-03-19 PROCEDURE — 94640 AIRWAY INHALATION TREATMENT: CPT

## 2022-03-19 PROCEDURE — 82962 GLUCOSE BLOOD TEST: CPT

## 2022-03-19 PROCEDURE — 6360000002 HC RX W HCPCS: Performed by: INTERNAL MEDICINE

## 2022-03-19 PROCEDURE — 85027 COMPLETE CBC AUTOMATED: CPT

## 2022-03-19 PROCEDURE — G0378 HOSPITAL OBSERVATION PER HR: HCPCS

## 2022-03-19 PROCEDURE — 84100 ASSAY OF PHOSPHORUS: CPT

## 2022-03-19 PROCEDURE — 6360000002 HC RX W HCPCS: Performed by: FAMILY MEDICINE

## 2022-03-19 PROCEDURE — 6370000000 HC RX 637 (ALT 250 FOR IP): Performed by: INTERNAL MEDICINE

## 2022-03-19 PROCEDURE — 6370000000 HC RX 637 (ALT 250 FOR IP): Performed by: FAMILY MEDICINE

## 2022-03-19 PROCEDURE — 2580000003 HC RX 258: Performed by: FAMILY MEDICINE

## 2022-03-19 PROCEDURE — 1200000000 HC SEMI PRIVATE

## 2022-03-19 PROCEDURE — 36415 COLL VENOUS BLD VENIPUNCTURE: CPT

## 2022-03-19 PROCEDURE — 96376 TX/PRO/DX INJ SAME DRUG ADON: CPT

## 2022-03-19 PROCEDURE — 80053 COMPREHEN METABOLIC PANEL: CPT

## 2022-03-19 RX ORDER — BUDESONIDE AND FORMOTEROL FUMARATE DIHYDRATE 160; 4.5 UG/1; UG/1
2 AEROSOL RESPIRATORY (INHALATION) 2 TIMES DAILY
Qty: 10.2 G | Refills: 0 | Status: ON HOLD | OUTPATIENT
Start: 2022-03-19 | End: 2022-07-07

## 2022-03-19 RX ORDER — PREDNISONE 10 MG/1
TABLET ORAL
Qty: 27 TABLET | Refills: 0 | Status: SHIPPED | OUTPATIENT
Start: 2022-03-19 | End: 2022-03-29

## 2022-03-19 RX ORDER — ALBUTEROL SULFATE 90 UG/1
2 AEROSOL, METERED RESPIRATORY (INHALATION) EVERY 6 HOURS PRN
Qty: 18 G | Refills: 0 | Status: ON HOLD | OUTPATIENT
Start: 2022-03-19 | End: 2022-07-07

## 2022-03-19 RX ORDER — IPRATROPIUM BROMIDE AND ALBUTEROL SULFATE 2.5; .5 MG/3ML; MG/3ML
3 SOLUTION RESPIRATORY (INHALATION) EVERY 4 HOURS PRN
Qty: 360 ML | Refills: 0 | Status: ON HOLD | OUTPATIENT
Start: 2022-03-19 | End: 2022-07-07

## 2022-03-19 RX ORDER — PREDNISONE 20 MG/1
40 TABLET ORAL DAILY
Status: DISCONTINUED | OUTPATIENT
Start: 2022-03-19 | End: 2022-03-19 | Stop reason: HOSPADM

## 2022-03-19 RX ADMIN — IPRATROPIUM BROMIDE AND ALBUTEROL SULFATE 1 AMPULE: .5; 2.5 SOLUTION RESPIRATORY (INHALATION) at 09:43

## 2022-03-19 RX ADMIN — CARBIDOPA AND LEVODOPA 2 TABLET: 25; 100 TABLET, EXTENDED RELEASE ORAL at 14:17

## 2022-03-19 RX ADMIN — AMLODIPINE BESYLATE 10 MG: 10 TABLET ORAL at 08:39

## 2022-03-19 RX ADMIN — ISOSORBIDE MONONITRATE 30 MG: 30 TABLET, EXTENDED RELEASE ORAL at 08:39

## 2022-03-19 RX ADMIN — LAMOTRIGINE 200 MG: 100 TABLET ORAL at 08:39

## 2022-03-19 RX ADMIN — BUSPIRONE HYDROCHLORIDE 5 MG: 5 TABLET ORAL at 08:39

## 2022-03-19 RX ADMIN — VENLAFAXINE HYDROCHLORIDE 150 MG: 150 CAPSULE, EXTENDED RELEASE ORAL at 08:39

## 2022-03-19 RX ADMIN — INSULIN LISPRO 8 UNITS: 100 INJECTION, SOLUTION INTRAVENOUS; SUBCUTANEOUS at 12:30

## 2022-03-19 RX ADMIN — ENOXAPARIN SODIUM 30 MG: 100 INJECTION SUBCUTANEOUS at 08:41

## 2022-03-19 RX ADMIN — ATORVASTATIN CALCIUM 20 MG: 20 TABLET, FILM COATED ORAL at 08:39

## 2022-03-19 RX ADMIN — ROPINIROLE HYDROCHLORIDE 1 MG: 1 TABLET, FILM COATED ORAL at 14:17

## 2022-03-19 RX ADMIN — SODIUM CHLORIDE, PRESERVATIVE FREE 10 ML: 5 INJECTION INTRAVENOUS at 08:41

## 2022-03-19 RX ADMIN — BUSPIRONE HYDROCHLORIDE 5 MG: 5 TABLET ORAL at 14:17

## 2022-03-19 RX ADMIN — ROPINIROLE HYDROCHLORIDE 1 MG: 1 TABLET, FILM COATED ORAL at 08:40

## 2022-03-19 RX ADMIN — METHYLPREDNISOLONE SODIUM SUCCINATE 40 MG: 40 INJECTION, POWDER, FOR SOLUTION INTRAMUSCULAR; INTRAVENOUS at 05:42

## 2022-03-19 RX ADMIN — CARBIDOPA AND LEVODOPA 2 TABLET: 25; 100 TABLET, EXTENDED RELEASE ORAL at 08:40

## 2022-03-19 RX ADMIN — INSULIN LISPRO 8 UNITS: 100 INJECTION, SOLUTION INTRAVENOUS; SUBCUTANEOUS at 08:42

## 2022-03-19 RX ADMIN — LOSARTAN POTASSIUM 50 MG: 50 TABLET, FILM COATED ORAL at 08:39

## 2022-03-19 RX ADMIN — PANTOPRAZOLE SODIUM 40 MG: 40 TABLET, DELAYED RELEASE ORAL at 05:42

## 2022-03-19 RX ADMIN — IPRATROPIUM BROMIDE AND ALBUTEROL SULFATE 1 AMPULE: .5; 2.5 SOLUTION RESPIRATORY (INHALATION) at 12:58

## 2022-03-19 RX ADMIN — METOPROLOL SUCCINATE 25 MG: 25 TABLET, EXTENDED RELEASE ORAL at 08:41

## 2022-03-19 ASSESSMENT — PAIN SCALES - GENERAL: PAINLEVEL_OUTOF10: 0

## 2022-03-19 NOTE — CARE COORDINATION
RN informed that Pt is requiring 2 L Oxygen and has a discharge order in. Pt was not choiced. Called Pt in her room to obtain choice of DME. Pt has no preference of Provider when they were listed for her except to be covered by insurance. RN called to inform that Walking Pulse was complete again and Pt did not drop below 88%. The first time Pt was talking constantly as they walked. Second time Pt was not talking and walking. Rn is going to complete a 3rd walking pulse oxy testing to make sure that Pt does not need oxygen. Pt has a ride today but not tomorrow. Discharge Plan is to return home.     DIPAK Maldonado.S.W.  [phone]

## 2022-03-19 NOTE — DISCHARGE SUMMARY
Discharge Summary    Admit date: 3/16/2022    Discharge date and time: No discharge date for patient encounter. Admitting Physician: Surekha Fontenot DO     Consultants: Pulmonology    Admission Diagnoses:  Asthma exacerbation    Discharge Diagnoses AND Hospital Course:  · Asthma exacerbation- IV steroids, duonebs. Says she was sent a nebulizer but never any actual medicine for the nebulizer at home. Respiratory panel negative. Now on room air. Wean steroids. Pulm following. EWean prednisone over 10 days. Symbicort and albuterol on discharge. Will need PFT as OP  · Exertional dyspnea  · Elevated troponin- 18>20>14. Repeat troponin. Denies CP. · Elevated proBNP-   · Type 2 diabetes- Hemoglobin A1c 6.9. Resume home medications   · Hypertension- reorder home medications  · Hyperlipidemia- statin  · Coronary artery disease- On aspirin, lipitor, imdur, metoprolol. · Parkinson's disease- on sinemet  · Prolonged QTc  · Obesity class III- BMI 44       Discharge Exam:  Vitals:    03/18/22 1935   BP: (!) 140/68   Pulse: 79   Resp: 16   Temp: 97.3 °F (36.3 °C)   SpO2: 93%       General appearance:  awake, alert, and oriented to person, place, time, and purpose; appears stated age and cooperative; no apparent distress no labored breathing  HEENT:  Conjunctivae/corneas clear. Neck: Supple. No jugular venous distention. Respiratory: symmetrical;diminished bilaterally; no wheezes; no rhonchi; no rales  Cardiovascular: rhythm regular; rate controlled; no murmurs  Abdomen: Soft, nontender, nondistended  Extremities:  peripheral pulses present; no peripheral edema; no ulcers  Musculoskeletal: No clubbing, cyanosis, no bilateral lower extremity edema. Brisk capillary refill. Skin:  No rashes  on visible skin  Neurologic: awake, alert and following commands     Disposition: home  The patient's condition is fair.   At this time the patient is without objective evidence of an acute process requiring continuing hospitalization or inpatient management. They are stable for discharge with outpatient follow-up. I have spoken with the patient and discussed the results of the current hospitalization, in addition to providing specific details for the plan of care and counseling regarding the diagnosis and prognosis. The plan has been discussed in detail and they are aware of the specific conditions for emergent return, as well as the importance of follow-up. Their questions are answered at this time and they are agreeable with the plan for discharge to home     Patient Instructions: Follow up with PCP within 7 days. Follow up with pulmonology as directed. Future Appointments   Date Time Provider Prateek Nassar   5/5/2022  1:00 PM MD Lupe Dooley [facility] AND [facility]       Discharge Medications:     Medication List      START taking these medications    albuterol sulfate  (90 Base) MCG/ACT inhaler  Commonly known as: Proventil HFA  Inhale 2 puffs into the lungs every 6 hours as needed for Wheezing     budesonide-formoterol 160-4.5 MCG/ACT Aero  Commonly known as: Symbicort  Inhale 2 puffs into the lungs 2 times daily     ipratropium-albuterol 0.5-2.5 (3) MG/3ML Soln nebulizer solution  Commonly known as: DUONEB  Inhale 3 mLs into the lungs every 4 hours as needed for Shortness of Breath     predniSONE 10 MG tablet  Commonly known as: DELTASONE  Take 4 tablets by mouth daily for 3 days, THEN 3 tablets daily for 3 days, THEN 2 tablets daily for 2 days, THEN 1 tablet daily for 2 days.   Start taking on: March 19, 2022        CONTINUE taking these medications    Accu-Chek Mary Plus strip  Generic drug: blood glucose test strips     ALPRAZolam 0.5 MG tablet  Commonly known as: XANAX     amLODIPine 10 MG tablet  Commonly known as: NORVASC  Take 1 tablet by mouth daily     aspirin 81 MG tablet     atorvastatin 20 MG tablet  Commonly known as: LIPITOR  Take 1 tablet by mouth daily     busPIRone 5 MG tablet  Commonly known as: BUSPAR  Take 1 tablet by mouth 3 times daily     carbidopa-levodopa  MG per extended release tablet  Commonly known as: Sinemet CR  Take 1 tablet by mouth 3 times daily     doxepin 10 MG capsule  Commonly known as: SINEQUAN     insulin aspart 100 UNIT/ML injection pen  Commonly known as: NovoLOG FlexPen  INJECT 0-10 UNITS INTO THE SKIN THREE TIMES DAILY(BEFORE MEALS) SLIDING SCALE (MAX DAILY 30 UNITS)     insulin glargine 100 UNIT/ML injection vial  Commonly known as: LANTUS  Inject 10 Units into the skin nightly     isosorbide mononitrate 30 MG extended release tablet  Commonly known as: IMDUR  Take 1 tablet by mouth daily     lamoTRIgine 200 MG tablet  Commonly known as: LAMICTAL     losartan 50 MG tablet  Commonly known as: COZAAR     meclizine 25 MG tablet  Commonly known as: ANTIVERT     metoprolol succinate 25 MG extended release tablet  Commonly known as: TOPROL XL  Take 1 tablet by mouth daily     montelukast 10 MG tablet  Commonly known as: SINGULAIR     multivitamin Tabs tablet  Take 1 tablet by mouth daily     nitroGLYCERIN 0.4 MG SL tablet  Commonly known as: NITROSTAT  up to max of 3 total doses. If no relief after 1 dose, call 911.      omeprazole 40 MG delayed release capsule  Commonly known as: PRILOSEC     rOPINIRole 1 MG tablet  Commonly known as: REQUIP  Take 1 tablet by mouth 3 times daily     venlafaxine 150 MG extended release capsule  Commonly known as: EFFEXOR XR     Vitamin D3 25 MCG Tabs  Take 1 tablet by mouth daily           Where to Get Your Medications      These medications were sent to [facility] [address] [address] [address]    Phone: [phone]   · albuterol sulfate  (90 Base) MCG/ACT inhaler  · budesonide-formoterol 160-4.5 MCG/ACT Aero  · ipratropium-albuterol 0.5-2.5 (3) MG/3ML Soln nebulizer solution  · predniSONE 10 MG tablet         Activity: activity as tolerated    Diet: Diabetic diet    Wound Care: none needed    Follow-up:     · This patient is instructed to follow-up with her primary care physician. · Patient is instructed to follow-up with the consults listed above as directed by them. · They are instructed to resume home medications and take new medications as indicated in the list above. · If the patient has a recurrence of symptoms, they are instructed to go to the ED. Preparing for this patient's discharge, including paperwork, orders, instructions, and meeting with patient did require > 30 minutes.     Liz Shane DO   8:08 AM  3/19/2022

## 2022-03-19 NOTE — PROGRESS NOTES
Physician Progress Note      PATIENT:               Eli Gastelum  CSN #:                  [id number]  :                       1950  ADMIT DATE:       3/16/2022 6:57 PM  DISCH DATE:  RESPONDING  PROVIDER #:        Marcella Donnelly DO        QUERY TEXT:    Stage of Chronic Kidney Disease: Please provide further specificity, if known. Clinical indicators include: chronic kidney disease, probnp, cr, bun,   creatinine, bnp, gfr, brain natriuretic peptide, pro-bnp  Options provided:  -- Chronic kidney disease stage 1  -- Chronic kidney disease stage 2  -- Chronic kidney disease stage 3  -- Chronic kidney disease stage 3a  -- Chronic kidney disease stage 3b  -- Chronic kidney disease stage 4  -- Chronic kidney disease stage 5  -- Chronic kidney disease stage 5, requiring dialysis  -- End stage renal disease  -- Other - I will add my own diagnosis  -- Disagree - Not applicable / Not valid  -- Disagree - Clinically Unable to determine / Unknown        PROVIDER RESPONSE TEXT:    Provider dismissed this query because it was not applicable to the patient or   not a valid query.       Electronically signed by:  Marcella Donnelly DO 3/19/2022 9:32 AM

## 2022-03-19 NOTE — PROGRESS NOTES
Pulse ox on room air sitting _93___%  Pulse ox on room air ambulating _85__%  Pulse ox on __0__ liters sitting recovery _89%  Pulse ox on __2__ liters sitting recovery _95__%  Pulse ox on _2__ liters, ambulating recovery _93__%

## 2022-03-19 NOTE — PROGRESS NOTES
Pulse ox on room air sitting _93___%  Pulse ox on room air ambulating _88__%  Pulse ox on __0__ liters sitting recovery _92%    Rechecked walking pulse ox as patient was talking continuously for my first assessment. This repeat was done with the patient walked up and down the ferreira, with intermittent talking. Patient does not qualify for home O2.

## 2022-03-19 NOTE — PROGRESS NOTES
CLINICAL PHARMACY NOTE: MEDS TO BEDS    Total # of Prescriptions Filled: 3   The following medications were delivered to the patient:  · symbicort 160-4.5mcg  · Albuterol sulfate hfa 108  · Prednisone 10mg    Additional Documentation:

## 2022-03-19 NOTE — PLAN OF CARE
Problem: Breathing Pattern - Ineffective:  Goal: Ability to achieve and maintain a regular respiratory rate will improve  Description: Ability to achieve and maintain a regular respiratory rate will improve  Outcome: Met This Shift     Problem: Gas Exchange - Impaired:  Goal: Levels of oxygenation will improve  Description: Levels of oxygenation will improve  Outcome: Met This Shift

## 2022-03-22 ENCOUNTER — TELEPHONE (OUTPATIENT)
Dept: FAMILY MEDICINE CLINIC | Age: 72
End: 2022-03-22

## 2022-03-22 NOTE — TELEPHONE ENCOUNTER
We had to cancel her new pt , appt. She saw dr Jennifer Demarco and he dismissed her . She said the [facility] never came to get her and had multiple missed appt.  Next available mid July,  Should we schedule her soon she needs refills on metotopril and isosorbide

## 2022-03-26 ENCOUNTER — APPOINTMENT (OUTPATIENT)
Dept: CT IMAGING | Age: 72
End: 2022-03-26
Payer: MEDICARE

## 2022-03-26 ENCOUNTER — HOSPITAL ENCOUNTER (EMERGENCY)
Age: 72
Discharge: HOME | End: 2022-03-27
Attending: EMERGENCY MEDICINE
Payer: MEDICARE

## 2022-03-26 ENCOUNTER — APPOINTMENT (OUTPATIENT)
Dept: GENERAL RADIOLOGY | Age: 72
End: 2022-03-26
Payer: MEDICARE

## 2022-03-26 VITALS
RESPIRATION RATE: 16 BRPM | WEIGHT: 220 LBS | HEIGHT: 60 IN | SYSTOLIC BLOOD PRESSURE: 132 MMHG | DIASTOLIC BLOOD PRESSURE: 63 MMHG | OXYGEN SATURATION: 93 % | BODY MASS INDEX: 43.19 KG/M2 | HEART RATE: 81 BPM | TEMPERATURE: 97.3 F

## 2022-03-26 DIAGNOSIS — M54.2 NECK PAIN: ICD-10-CM

## 2022-03-26 DIAGNOSIS — S09.90XA INJURY OF HEAD, INITIAL ENCOUNTER: Primary | ICD-10-CM

## 2022-03-26 DIAGNOSIS — S89.91XA INJURY OF RIGHT KNEE, INITIAL ENCOUNTER: ICD-10-CM

## 2022-03-26 DIAGNOSIS — S93.401A SPRAIN OF RIGHT ANKLE, UNSPECIFIED LIGAMENT, INITIAL ENCOUNTER: ICD-10-CM

## 2022-03-26 LAB
ALBUMIN SERPL-MCNC: 4 G/DL (ref 3.5–5.2)
ALP BLD-CCNC: 118 U/L (ref 35–104)
ALT SERPL-CCNC: <5 U/L (ref 0–32)
ANION GAP SERPL CALCULATED.3IONS-SCNC: 11 MMOL/L (ref 7–16)
AST SERPL-CCNC: 6 U/L (ref 0–31)
BASOPHILS ABSOLUTE: 0.02 E9/L (ref 0–0.2)
BASOPHILS RELATIVE PERCENT: 0.2 % (ref 0–2)
BILIRUB SERPL-MCNC: 0.4 MG/DL (ref 0–1.2)
BUN BLDV-MCNC: 19 MG/DL (ref 6–23)
CALCIUM SERPL-MCNC: 8.9 MG/DL (ref 8.6–10.2)
CHLORIDE BLD-SCNC: 100 MMOL/L (ref 98–107)
CO2: 32 MMOL/L (ref 22–29)
CREAT SERPL-MCNC: 0.9 MG/DL (ref 0.5–1)
EOSINOPHILS ABSOLUTE: 0.11 E9/L (ref 0.05–0.5)
EOSINOPHILS RELATIVE PERCENT: 1.3 % (ref 0–6)
GFR AFRICAN AMERICAN: >60
GFR NON-AFRICAN AMERICAN: >60 ML/MIN/1.73
GLUCOSE BLD-MCNC: 218 MG/DL (ref 74–99)
HCT VFR BLD CALC: 41.8 % (ref 34–48)
HEMOGLOBIN: 12.2 G/DL (ref 11.5–15.5)
IMMATURE GRANULOCYTES #: 0.08 E9/L
IMMATURE GRANULOCYTES %: 1 % (ref 0–5)
LYMPHOCYTES ABSOLUTE: 1.89 E9/L (ref 1.5–4)
LYMPHOCYTES RELATIVE PERCENT: 22.6 % (ref 20–42)
MCH RBC QN AUTO: 25.7 PG (ref 26–35)
MCHC RBC AUTO-ENTMCNC: 29.2 % (ref 32–34.5)
MCV RBC AUTO: 88.2 FL (ref 80–99.9)
MONOCYTES ABSOLUTE: 0.4 E9/L (ref 0.1–0.95)
MONOCYTES RELATIVE PERCENT: 4.8 % (ref 2–12)
NEUTROPHILS ABSOLUTE: 5.85 E9/L (ref 1.8–7.3)
NEUTROPHILS RELATIVE PERCENT: 70.1 % (ref 43–80)
PDW BLD-RTO: 14.9 FL (ref 11.5–15)
PLATELET # BLD: 231 E9/L (ref 130–450)
PMV BLD AUTO: 10.4 FL (ref 7–12)
POTASSIUM SERPL-SCNC: 3.5 MMOL/L (ref 3.5–5)
RBC # BLD: 4.74 E12/L (ref 3.5–5.5)
SODIUM BLD-SCNC: 143 MMOL/L (ref 132–146)
TOTAL PROTEIN: 6.7 G/DL (ref 6.4–8.3)
WBC # BLD: 8.4 E9/L (ref 4.5–11.5)

## 2022-03-26 PROCEDURE — 99284 EMERGENCY DEPT VISIT MOD MDM: CPT

## 2022-03-26 PROCEDURE — 85025 COMPLETE CBC W/AUTO DIFF WBC: CPT

## 2022-03-26 PROCEDURE — 73610 X-RAY EXAM OF ANKLE: CPT

## 2022-03-26 PROCEDURE — 72125 CT NECK SPINE W/O DYE: CPT

## 2022-03-26 PROCEDURE — 80053 COMPREHEN METABOLIC PANEL: CPT

## 2022-03-26 PROCEDURE — 71250 CT THORAX DX C-: CPT

## 2022-03-26 PROCEDURE — 73630 X-RAY EXAM OF FOOT: CPT

## 2022-03-26 PROCEDURE — 70450 CT HEAD/BRAIN W/O DYE: CPT

## 2022-03-26 ASSESSMENT — PAIN DESCRIPTION - DESCRIPTORS: DESCRIPTORS: HEADACHE

## 2022-03-26 ASSESSMENT — PAIN DESCRIPTION - PAIN TYPE: TYPE: ACUTE PAIN

## 2022-03-26 ASSESSMENT — PAIN DESCRIPTION - LOCATION: LOCATION: HEAD

## 2022-03-26 ASSESSMENT — PAIN DESCRIPTION - ORIENTATION: ORIENTATION: RIGHT;LEFT

## 2022-03-26 ASSESSMENT — PAIN DESCRIPTION - PROGRESSION: CLINICAL_PROGRESSION: NOT CHANGED

## 2022-03-26 ASSESSMENT — PAIN DESCRIPTION - FREQUENCY: FREQUENCY: CONTINUOUS

## 2022-03-26 ASSESSMENT — PAIN DESCRIPTION - ONSET: ONSET: SUDDEN

## 2022-03-27 ENCOUNTER — APPOINTMENT (OUTPATIENT)
Dept: GENERAL RADIOLOGY | Age: 72
End: 2022-03-27
Payer: MEDICARE

## 2022-03-27 PROCEDURE — 6370000000 HC RX 637 (ALT 250 FOR IP): Performed by: PHYSICIAN ASSISTANT

## 2022-03-27 PROCEDURE — 73564 X-RAY EXAM KNEE 4 OR MORE: CPT

## 2022-03-27 RX ORDER — ACETAMINOPHEN 650 MG/1
650 SUPPOSITORY RECTAL ONCE
Status: DISCONTINUED | OUTPATIENT
Start: 2022-03-27 | End: 2022-03-27

## 2022-03-27 RX ORDER — ACETAMINOPHEN 325 MG/1
650 TABLET ORAL ONCE
Status: COMPLETED | OUTPATIENT
Start: 2022-03-27 | End: 2022-03-27

## 2022-03-27 RX ADMIN — ACETAMINOPHEN 650 MG: 325 TABLET ORAL at 01:38

## 2022-03-27 ASSESSMENT — PAIN SCALES - GENERAL: PAINLEVEL_OUTOF10: 5

## 2022-03-27 NOTE — ED PROVIDER NOTES
HPI:  3/26/22, Time: 10:28 PM EDT         Allyson Reyna is a 66 y.o. female presenting to the ED for fall with back pain as well as hitting head, beginning short time ago. The complaint has been persistent, moderate in severity, and worsened by movement of upper back. Patient went to reach for paper on the ground lost her balance and fell she did strike her head she did reports no loss of conscious reports no numbness or tingling she does report some right upper rib pain. Patient reporting no abdominal pain she reports no leg pain or swelling she reports no hip pain. She reports no numbness or tingling. Patient reporting no visual changes she does report headache. ROS:   Pertinent positives and negatives are stated within HPI, all other systems reviewed and are negative.  --------------------------------------------- PAST HISTORY ---------------------------------------------  Past Medical History:  has a past medical history of Anxiety, Asthma, CAD (coronary artery disease), Cancer ([facility] [address] Chronic kidney disease, Depression, [facility] H/O mammogram, Hx MRSA infection, Hyperlipidemia, Hypertension, Lateral epicondylitis, SERENA on CPAP, and Tubal ligation status. Past Surgical History:  has a past surgical history that includes Gallbladder surgery; Toe amputation; Cholecystectomy; Colonoscopy (7/29/15); Endoscopy, colon, diagnostic (7/19/15); Upper gastrointestinal endoscopy; lumbar laminectomy; Tonsillectomy; Breast lumpectomy; Breast reduction surgery; Cardiac catheterization (4/28/2014); and Cardiac catheterization (01/11/2022). Social History:  reports that she has never smoked. She has never used smokeless tobacco. She reports that she does not drink alcohol and does not use drugs. Family History: family history includes Cancer in her father and mother; Diabetes in her sister; Heart Disease in her sister; Other in her brother; Seizures in her son.      The patients home medications have been reviewed. Allergies: Ciprofloxacin and Codeine    ---------------------------------------------------PHYSICAL EXAM--------------------------------------    Constitutional/General: Alert and oriented x3, well appearing, non toxic in NAD  Head: Normocephalic tender posterior scalp  Eyes: PERRL, EOMI  Mouth: Oropharynx clear, handling secretions, no trismus  Neck: Supple, full ROM, non tender to palpation in the midline, no stridor, no crepitus, no meningeal signs  Pulmonary: Lungs clear to auscultation bilaterally, no wheezes, rales, or rhonchi. Not in respiratory distress  Cardiovascular:  Regular rate. Regular rhythm. No murmurs, gallops, or rubs. 2+ distal pulses  Chest: Right upper back tenderness  Abdomen: Soft. Non tender. Non distended. +BS. No rebound, guarding, or rigidity. No pulsatile masses appreciated. Musculoskeletal: Moves all extremities x 4, tenderness to ankle as well as knee range of motion full but painful. Warm and well perfused, no clubbing, cyanosis, or edema. Capillary refill <3 seconds  Skin: warm and dry. No rashes. Neurologic: GCS 15, CN 2-12 grossly intact, no focal deficits, symmetric strength 5/5 in the upper and lower extremities bilaterally  Psych: Normal Affect    -------------------------------------------------- RESULTS -------------------------------------------------  I have personally reviewed all laboratory and imaging results for this patient. Results are listed below.      LABS:  Results for orders placed or performed during the hospital encounter of 03/26/22   CBC with Auto Differential   Result Value Ref Range    WBC 8.4 4.5 - 11.5 E9/L    RBC 4.74 3.50 - 5.50 E12/L    Hemoglobin 12.2 11.5 - 15.5 g/dL    Hematocrit 41.8 34.0 - 48.0 %    MCV 88.2 80.0 - 99.9 fL    MCH 25.7 (L) 26.0 - 35.0 pg    MCHC 29.2 (L) 32.0 - 34.5 %    RDW 14.9 11.5 - 15.0 fL    Platelets 398 451 - 753 E9/L    MPV 10.4 7.0 - 12.0 fL    Neutrophils % 70.1 43.0 - 80.0 % Immature Granulocytes % 1.0 0.0 - 5.0 %    Lymphocytes % 22.6 20.0 - 42.0 %    Monocytes % 4.8 2.0 - 12.0 %    Eosinophils % 1.3 0.0 - 6.0 %    Basophils % 0.2 0.0 - 2.0 %    Neutrophils Absolute 5.85 1.80 - 7.30 E9/L    Immature Granulocytes # 0.08 E9/L    Lymphocytes Absolute 1.89 1.50 - 4.00 E9/L    Monocytes Absolute 0.40 0.10 - 0.95 E9/L    Eosinophils Absolute 0.11 0.05 - 0.50 E9/L    Basophils Absolute 0.02 0.00 - 0.20 E9/L   Comprehensive Metabolic Panel   Result Value Ref Range    Sodium 143 132 - 146 mmol/L    Potassium 3.5 3.5 - 5.0 mmol/L    Chloride 100 98 - 107 mmol/L    CO2 32 (H) 22 - 29 mmol/L    Anion Gap 11 7 - 16 mmol/L    Glucose 218 (H) 74 - 99 mg/dL    BUN 19 6 - 23 mg/dL    CREATININE 0.9 0.5 - 1.0 mg/dL    GFR Non-African American >60 >=60 mL/min/1.73    GFR African American >60     Calcium 8.9 8.6 - 10.2 mg/dL    Total Protein 6.7 6.4 - 8.3 g/dL    Albumin 4.0 3.5 - 5.2 g/dL    Total Bilirubin 0.4 0.0 - 1.2 mg/dL    Alkaline Phosphatase 118 (H) 35 - 104 U/L    ALT <5 0 - 32 U/L    AST 6 0 - 31 U/L       RADIOLOGY:  Interpreted by Radiologist.  XR KNEE RIGHT (MIN 4 VIEWS)   Final Result   No acute abnormality of the knee. Medial and patellofemoral compartment degenerative changes. CT HEAD WO CONTRAST   Final Result   No CT evidence of an acute intracranial abnormality. No evidence of acute fracture or traumatic malalignment of the cervical spine. CT CERVICAL SPINE WO CONTRAST   Final Result   No CT evidence of an acute intracranial abnormality. No evidence of acute fracture or traumatic malalignment of the cervical spine. CT CHEST WO CONTRAST   Final Result   Atraumatic appearance of the chest.      Splenomegaly. XR ANKLE RIGHT (MIN 3 VIEWS)   Final Result   No acute abnormality of the ankle.          XR FOOT RIGHT (MIN 3 VIEWS)   Final Result   No acute osseous abnormality.                   ------------------------- NURSING NOTES AND VITALS REVIEWED ---------------------------   The nursing notes within the ED encounter and vital signs as below have been reviewed by myself. /63   Pulse 81   Temp 97.3 °F (36.3 °C) (Oral)   Resp 16   Ht 5' (1.524 m)   Wt 220 lb (99.8 kg)   SpO2 93%   BMI 42.97 kg/m²   Oxygen Saturation Interpretation: Normal    The patients available past medical records and past encounters were reviewed. ------------------------------ ED COURSE/MEDICAL DECISION MAKING----------------------  Medications   acetaminophen (TYLENOL) tablet 650 mg (650 mg Oral Given 3/27/22 0138)             Medical Decision Making:      Patient presenting here because of fall. Patient was reaching for something on the floor she reports lost her balance and fell. She reports some knee pain as well as some lower extremity pain or ankle. Patient reporting some posterior rib pain. She reports no chest pain or difficulty breathing she did strike her head she reports no numbness or tingling she reports no hip pain. Patient does have tenderness to her knee as well as her ankle pulses are intact CTs of her head neck and chest noted no signs of fracture x-ray is also negative. Patient given Tylenol. Patient plan will be to discharge home. Re-Evaluations:             Re-evaluation. Patients symptoms show no change  Patient reeval made aware of findings and plan. Patient medicated here x-rays were reviewed with the patient. Plan will be to discharge home. Patient able to ambulate. Consultations:                 Critical Care: This patient's ED course included: a personal history and physicial eaxmination    This patient has been closely monitored during their ED course. Counseling: The emergency provider has spoken with the patient and discussed todays results, in addition to providing specific details for the plan of care and counseling regarding the diagnosis and prognosis.   Questions are answered at this time and they are agreeable with the plan.       --------------------------------- IMPRESSION AND DISPOSITION ---------------------------------    IMPRESSION  1. Injury of head, initial encounter    2. Neck pain    3. Injury of right knee, initial encounter    4. Sprain of right ankle, unspecified ligament, initial encounter        DISPOSITION  Disposition: Discharge to home  Patient condition is stable        NOTE: This report was transcribed using voice recognition software.  Every effort was made to ensure accuracy; however, inadvertent computerized transcription errors may be present          Heidi Johnson MD  03/27/22 [id number]       Heidi Johnson MD  03/27/22 [id number]

## 2022-03-27 NOTE — ED NOTES
Patient ambulatory in the emergency department. ACE wrap applied to right knee. Neurovascularly intact following ACE wrap. Discussed results with the patient and return precautions. Recommended close follow up with PCP for recheck.       Hanny Amanda  03/27/22 [id number]

## 2022-04-14 ENCOUNTER — HOSPITAL ENCOUNTER (INPATIENT)
Age: 72
LOS: 5 days | Discharge: NURSING, REHAB OR LONG-TERM CARE | DRG: 308 | End: 2022-04-19
Attending: EMERGENCY MEDICINE | Admitting: FAMILY MEDICINE
Payer: MEDICARE

## 2022-04-14 ENCOUNTER — APPOINTMENT (OUTPATIENT)
Dept: CT IMAGING | Age: 72
DRG: 308 | End: 2022-04-14
Payer: MEDICARE

## 2022-04-14 ENCOUNTER — APPOINTMENT (OUTPATIENT)
Dept: GENERAL RADIOLOGY | Age: 72
DRG: 308 | End: 2022-04-14
Payer: MEDICARE

## 2022-04-14 DIAGNOSIS — I48.91 NEW ONSET A-FIB (HCC): Primary | ICD-10-CM

## 2022-04-14 DIAGNOSIS — I50.9 ACUTE ON CHRONIC CONGESTIVE HEART FAILURE, UNSPECIFIED HEART FAILURE TYPE (HCC): ICD-10-CM

## 2022-04-14 DIAGNOSIS — N28.9 ACUTE RENAL INSUFFICIENCY: ICD-10-CM

## 2022-04-14 LAB
ALBUMIN SERPL-MCNC: 3.7 G/DL (ref 3.5–5.2)
ALP BLD-CCNC: 98 U/L (ref 35–104)
ALT SERPL-CCNC: <5 U/L (ref 0–32)
ANION GAP SERPL CALCULATED.3IONS-SCNC: 9 MMOL/L (ref 7–16)
AST SERPL-CCNC: 7 U/L (ref 0–31)
BACTERIA: ABNORMAL /HPF
BASOPHILS ABSOLUTE: 0.03 E9/L (ref 0–0.2)
BASOPHILS RELATIVE PERCENT: 0.6 % (ref 0–2)
BILIRUB SERPL-MCNC: 0.6 MG/DL (ref 0–1.2)
BILIRUBIN URINE: NEGATIVE
BLOOD, URINE: NEGATIVE
BUN BLDV-MCNC: 12 MG/DL (ref 6–23)
CALCIUM SERPL-MCNC: 8.5 MG/DL (ref 8.6–10.2)
CHLORIDE BLD-SCNC: 101 MMOL/L (ref 98–107)
CLARITY: CLEAR
CO2: 28 MMOL/L (ref 22–29)
COLOR: YELLOW
CREAT SERPL-MCNC: 1.2 MG/DL (ref 0.5–1)
D DIMER: <200 NG/ML DDU
EKG ATRIAL RATE: 74 BPM
EKG Q-T INTERVAL: 316 MS
EKG QRS DURATION: 82 MS
EKG QTC CALCULATION (BAZETT): 433 MS
EKG R AXIS: 86 DEGREES
EKG T AXIS: 9 DEGREES
EKG VENTRICULAR RATE: 113 BPM
EOSINOPHILS ABSOLUTE: 0.04 E9/L (ref 0.05–0.5)
EOSINOPHILS RELATIVE PERCENT: 0.8 % (ref 0–6)
GFR AFRICAN AMERICAN: 53
GFR NON-AFRICAN AMERICAN: 44 ML/MIN/1.73
GLUCOSE BLD-MCNC: 146 MG/DL (ref 74–99)
GLUCOSE URINE: NEGATIVE MG/DL
HCT VFR BLD CALC: 34.2 % (ref 34–48)
HEMOGLOBIN: 10 G/DL (ref 11.5–15.5)
IMMATURE GRANULOCYTES #: 0.03 E9/L
IMMATURE GRANULOCYTES %: 0.6 % (ref 0–5)
KETONES, URINE: NEGATIVE MG/DL
LEUKOCYTE ESTERASE, URINE: NEGATIVE
LYMPHOCYTES ABSOLUTE: 0.94 E9/L (ref 1.5–4)
LYMPHOCYTES RELATIVE PERCENT: 19.3 % (ref 20–42)
MCH RBC QN AUTO: 25.9 PG (ref 26–35)
MCHC RBC AUTO-ENTMCNC: 29.2 % (ref 32–34.5)
MCV RBC AUTO: 88.6 FL (ref 80–99.9)
METER GLUCOSE: 149 MG/DL (ref 74–99)
MONOCYTES ABSOLUTE: 0.28 E9/L (ref 0.1–0.95)
MONOCYTES RELATIVE PERCENT: 5.7 % (ref 2–12)
NEUTROPHILS ABSOLUTE: 3.56 E9/L (ref 1.8–7.3)
NEUTROPHILS RELATIVE PERCENT: 73 % (ref 43–80)
NITRITE, URINE: NEGATIVE
PDW BLD-RTO: 16.6 FL (ref 11.5–15)
PH UA: 6 (ref 5–9)
PLATELET # BLD: 202 E9/L (ref 130–450)
PMV BLD AUTO: 9.8 FL (ref 7–12)
POTASSIUM REFLEX MAGNESIUM: 4 MMOL/L (ref 3.5–5)
PROTEIN UA: 30 MG/DL
RBC # BLD: 3.86 E12/L (ref 3.5–5.5)
RBC UA: ABNORMAL /HPF (ref 0–2)
SODIUM BLD-SCNC: 138 MMOL/L (ref 132–146)
SPECIFIC GRAVITY UA: 1.01 (ref 1–1.03)
TOTAL PROTEIN: 5.8 G/DL (ref 6.4–8.3)
TROPONIN, HIGH SENSITIVITY: 20 NG/L (ref 0–9)
TROPONIN, HIGH SENSITIVITY: 21 NG/L (ref 0–9)
TSH SERPL DL<=0.05 MIU/L-ACNC: 1.03 UIU/ML (ref 0.27–4.2)
UROBILINOGEN, URINE: 0.2 E.U./DL
WBC # BLD: 4.9 E9/L (ref 4.5–11.5)
WBC UA: ABNORMAL /HPF (ref 0–5)

## 2022-04-14 PROCEDURE — 82962 GLUCOSE BLOOD TEST: CPT

## 2022-04-14 PROCEDURE — 6370000000 HC RX 637 (ALT 250 FOR IP): Performed by: NURSE PRACTITIONER

## 2022-04-14 PROCEDURE — 6370000000 HC RX 637 (ALT 250 FOR IP): Performed by: STUDENT IN AN ORGANIZED HEALTH CARE EDUCATION/TRAINING PROGRAM

## 2022-04-14 PROCEDURE — 70450 CT HEAD/BRAIN W/O DYE: CPT

## 2022-04-14 PROCEDURE — 36415 COLL VENOUS BLD VENIPUNCTURE: CPT

## 2022-04-14 PROCEDURE — 81001 URINALYSIS AUTO W/SCOPE: CPT

## 2022-04-14 PROCEDURE — 94640 AIRWAY INHALATION TREATMENT: CPT

## 2022-04-14 PROCEDURE — 71045 X-RAY EXAM CHEST 1 VIEW: CPT

## 2022-04-14 PROCEDURE — 80053 COMPREHEN METABOLIC PANEL: CPT

## 2022-04-14 PROCEDURE — 6360000002 HC RX W HCPCS: Performed by: NURSE PRACTITIONER

## 2022-04-14 PROCEDURE — 84443 ASSAY THYROID STIM HORMONE: CPT

## 2022-04-14 PROCEDURE — 2060000000 HC ICU INTERMEDIATE R&B

## 2022-04-14 PROCEDURE — 2580000003 HC RX 258: Performed by: NURSE PRACTITIONER

## 2022-04-14 PROCEDURE — 85378 FIBRIN DEGRADE SEMIQUANT: CPT

## 2022-04-14 PROCEDURE — 6360000002 HC RX W HCPCS: Performed by: STUDENT IN AN ORGANIZED HEALTH CARE EDUCATION/TRAINING PROGRAM

## 2022-04-14 PROCEDURE — 87088 URINE BACTERIA CULTURE: CPT

## 2022-04-14 PROCEDURE — 96374 THER/PROPH/DIAG INJ IV PUSH: CPT

## 2022-04-14 PROCEDURE — 72125 CT NECK SPINE W/O DYE: CPT

## 2022-04-14 PROCEDURE — APPSS45 APP SPLIT SHARED TIME 31-45 MINUTES: Performed by: NURSE PRACTITIONER

## 2022-04-14 PROCEDURE — 93005 ELECTROCARDIOGRAM TRACING: CPT | Performed by: STUDENT IN AN ORGANIZED HEALTH CARE EDUCATION/TRAINING PROGRAM

## 2022-04-14 PROCEDURE — 85025 COMPLETE CBC W/AUTO DIFF WBC: CPT

## 2022-04-14 PROCEDURE — 99285 EMERGENCY DEPT VISIT HI MDM: CPT

## 2022-04-14 PROCEDURE — 94664 DEMO&/EVAL PT USE INHALER: CPT

## 2022-04-14 PROCEDURE — 99223 1ST HOSP IP/OBS HIGH 75: CPT | Performed by: FAMILY MEDICINE

## 2022-04-14 PROCEDURE — 2500000003 HC RX 250 WO HCPCS: Performed by: STUDENT IN AN ORGANIZED HEALTH CARE EDUCATION/TRAINING PROGRAM

## 2022-04-14 PROCEDURE — 84484 ASSAY OF TROPONIN QUANT: CPT

## 2022-04-14 RX ORDER — ACETAMINOPHEN 650 MG/1
650 SUPPOSITORY RECTAL EVERY 6 HOURS PRN
Status: DISCONTINUED | OUTPATIENT
Start: 2022-04-14 | End: 2022-04-19 | Stop reason: HOSPADM

## 2022-04-14 RX ORDER — BUDESONIDE 0.5 MG/2ML
0.5 INHALANT ORAL 2 TIMES DAILY
Status: DISCONTINUED | OUTPATIENT
Start: 2022-04-14 | End: 2022-04-19 | Stop reason: HOSPADM

## 2022-04-14 RX ORDER — ARFORMOTEROL TARTRATE 15 UG/2ML
15 SOLUTION RESPIRATORY (INHALATION) 2 TIMES DAILY
Status: DISCONTINUED | OUTPATIENT
Start: 2022-04-14 | End: 2022-04-19 | Stop reason: HOSPADM

## 2022-04-14 RX ORDER — NICOTINE POLACRILEX 4 MG
15 LOZENGE BUCCAL PRN
Status: DISCONTINUED | OUTPATIENT
Start: 2022-04-14 | End: 2022-04-19 | Stop reason: HOSPADM

## 2022-04-14 RX ORDER — CARBIDOPA AND LEVODOPA 50; 200 MG/1; MG/1
1 TABLET, EXTENDED RELEASE ORAL 3 TIMES DAILY
Status: DISCONTINUED | OUTPATIENT
Start: 2022-04-14 | End: 2022-04-14 | Stop reason: RX

## 2022-04-14 RX ORDER — VITAMIN B COMPLEX
1000 TABLET ORAL DAILY
Status: DISCONTINUED | OUTPATIENT
Start: 2022-04-14 | End: 2022-04-19 | Stop reason: HOSPADM

## 2022-04-14 RX ORDER — PANTOPRAZOLE SODIUM 40 MG/1
40 TABLET, DELAYED RELEASE ORAL
Status: DISCONTINUED | OUTPATIENT
Start: 2022-04-15 | End: 2022-04-19 | Stop reason: HOSPADM

## 2022-04-14 RX ORDER — POLYETHYLENE GLYCOL 3350 17 G/17G
17 POWDER, FOR SOLUTION ORAL DAILY PRN
Status: DISCONTINUED | OUTPATIENT
Start: 2022-04-14 | End: 2022-04-19 | Stop reason: HOSPADM

## 2022-04-14 RX ORDER — AMLODIPINE BESYLATE 5 MG/1
10 TABLET ORAL DAILY
Status: DISCONTINUED | OUTPATIENT
Start: 2022-04-14 | End: 2022-04-15

## 2022-04-14 RX ORDER — SODIUM CHLORIDE 0.9 % (FLUSH) 0.9 %
5-40 SYRINGE (ML) INJECTION PRN
Status: DISCONTINUED | OUTPATIENT
Start: 2022-04-14 | End: 2022-04-19 | Stop reason: HOSPADM

## 2022-04-14 RX ORDER — BUSPIRONE HYDROCHLORIDE 5 MG/1
5 TABLET ORAL 3 TIMES DAILY
Status: DISCONTINUED | OUTPATIENT
Start: 2022-04-14 | End: 2022-04-19 | Stop reason: HOSPADM

## 2022-04-14 RX ORDER — INSULIN GLARGINE 100 [IU]/ML
10 INJECTION, SOLUTION SUBCUTANEOUS NIGHTLY
Status: DISCONTINUED | OUTPATIENT
Start: 2022-04-14 | End: 2022-04-19 | Stop reason: HOSPADM

## 2022-04-14 RX ORDER — SODIUM CHLORIDE 0.9 % (FLUSH) 0.9 %
5-40 SYRINGE (ML) INJECTION EVERY 12 HOURS SCHEDULED
Status: DISCONTINUED | OUTPATIENT
Start: 2022-04-14 | End: 2022-04-19 | Stop reason: HOSPADM

## 2022-04-14 RX ORDER — ROPINIROLE 1 MG/1
1 TABLET, FILM COATED ORAL 3 TIMES DAILY
Status: DISCONTINUED | OUTPATIENT
Start: 2022-04-14 | End: 2022-04-19 | Stop reason: HOSPADM

## 2022-04-14 RX ORDER — ALBUTEROL SULFATE 2.5 MG/3ML
2.5 SOLUTION RESPIRATORY (INHALATION) EVERY 4 HOURS PRN
Status: DISCONTINUED | OUTPATIENT
Start: 2022-04-14 | End: 2022-04-19 | Stop reason: HOSPADM

## 2022-04-14 RX ORDER — ATORVASTATIN CALCIUM 40 MG/1
20 TABLET, FILM COATED ORAL DAILY
Status: DISCONTINUED | OUTPATIENT
Start: 2022-04-15 | End: 2022-04-19 | Stop reason: HOSPADM

## 2022-04-14 RX ORDER — LAMOTRIGINE 100 MG/1
200 TABLET ORAL DAILY
Status: DISCONTINUED | OUTPATIENT
Start: 2022-04-15 | End: 2022-04-19 | Stop reason: HOSPADM

## 2022-04-14 RX ORDER — ACETAMINOPHEN 325 MG/1
650 TABLET ORAL EVERY 6 HOURS PRN
Status: DISCONTINUED | OUTPATIENT
Start: 2022-04-14 | End: 2022-04-19 | Stop reason: HOSPADM

## 2022-04-14 RX ORDER — ALBUTEROL SULFATE 90 UG/1
2 AEROSOL, METERED RESPIRATORY (INHALATION) EVERY 4 HOURS PRN
Status: DISCONTINUED | OUTPATIENT
Start: 2022-04-14 | End: 2022-04-14 | Stop reason: CLARIF

## 2022-04-14 RX ORDER — LOSARTAN POTASSIUM 50 MG/1
50 TABLET ORAL DAILY
Status: DISCONTINUED | OUTPATIENT
Start: 2022-04-15 | End: 2022-04-15

## 2022-04-14 RX ORDER — VENLAFAXINE HYDROCHLORIDE 150 MG/1
150 CAPSULE, EXTENDED RELEASE ORAL DAILY
Status: DISCONTINUED | OUTPATIENT
Start: 2022-04-15 | End: 2022-04-19 | Stop reason: HOSPADM

## 2022-04-14 RX ORDER — ACETAMINOPHEN 500 MG
1000 TABLET ORAL ONCE
Status: COMPLETED | OUTPATIENT
Start: 2022-04-14 | End: 2022-04-14

## 2022-04-14 RX ORDER — METOPROLOL SUCCINATE 25 MG/1
25 TABLET, EXTENDED RELEASE ORAL DAILY
Status: DISCONTINUED | OUTPATIENT
Start: 2022-04-14 | End: 2022-04-15

## 2022-04-14 RX ORDER — ASPIRIN 81 MG/1
81 TABLET ORAL NIGHTLY
Status: DISCONTINUED | OUTPATIENT
Start: 2022-04-14 | End: 2022-04-15

## 2022-04-14 RX ORDER — DEXTROSE MONOHYDRATE 25 G/50ML
12.5 INJECTION, SOLUTION INTRAVENOUS PRN
Status: DISCONTINUED | OUTPATIENT
Start: 2022-04-14 | End: 2022-04-14 | Stop reason: CLARIF

## 2022-04-14 RX ORDER — DEXTROSE MONOHYDRATE 50 MG/ML
100 INJECTION, SOLUTION INTRAVENOUS PRN
Status: DISCONTINUED | OUTPATIENT
Start: 2022-04-14 | End: 2022-04-19 | Stop reason: HOSPADM

## 2022-04-14 RX ORDER — BUDESONIDE AND FORMOTEROL FUMARATE DIHYDRATE 160; 4.5 UG/1; UG/1
2 AEROSOL RESPIRATORY (INHALATION) 2 TIMES DAILY
Status: DISCONTINUED | OUTPATIENT
Start: 2022-04-14 | End: 2022-04-14 | Stop reason: CLARIF

## 2022-04-14 RX ORDER — 0.9 % SODIUM CHLORIDE 0.9 %
1000 INTRAVENOUS SOLUTION INTRAVENOUS ONCE
Status: DISCONTINUED | OUTPATIENT
Start: 2022-04-14 | End: 2022-04-14

## 2022-04-14 RX ORDER — MECLIZINE HCL 12.5 MG/1
25 TABLET ORAL ONCE
Status: DISCONTINUED | OUTPATIENT
Start: 2022-04-14 | End: 2022-04-14

## 2022-04-14 RX ORDER — ALPRAZOLAM 0.25 MG/1
0.5 TABLET ORAL DAILY PRN
Status: DISCONTINUED | OUTPATIENT
Start: 2022-04-14 | End: 2022-04-19 | Stop reason: HOSPADM

## 2022-04-14 RX ORDER — DOXEPIN HYDROCHLORIDE 10 MG/1
10 CAPSULE ORAL NIGHTLY
Status: DISCONTINUED | OUTPATIENT
Start: 2022-04-14 | End: 2022-04-19 | Stop reason: HOSPADM

## 2022-04-14 RX ORDER — FUROSEMIDE 10 MG/ML
20 INJECTION INTRAMUSCULAR; INTRAVENOUS ONCE
Status: COMPLETED | OUTPATIENT
Start: 2022-04-14 | End: 2022-04-14

## 2022-04-14 RX ORDER — CARBIDOPA AND LEVODOPA 25; 100 MG/1; MG/1
1 TABLET, EXTENDED RELEASE ORAL 3 TIMES DAILY
Status: DISCONTINUED | OUTPATIENT
Start: 2022-04-14 | End: 2022-04-14 | Stop reason: CLARIF

## 2022-04-14 RX ORDER — SODIUM CHLORIDE 9 MG/ML
INJECTION, SOLUTION INTRAVENOUS PRN
Status: DISCONTINUED | OUTPATIENT
Start: 2022-04-14 | End: 2022-04-19 | Stop reason: HOSPADM

## 2022-04-14 RX ORDER — ISOSORBIDE MONONITRATE 30 MG/1
30 TABLET, EXTENDED RELEASE ORAL DAILY
Status: DISCONTINUED | OUTPATIENT
Start: 2022-04-15 | End: 2022-04-19 | Stop reason: HOSPADM

## 2022-04-14 RX ORDER — MONTELUKAST SODIUM 10 MG/1
10 TABLET ORAL NIGHTLY
Status: DISCONTINUED | OUTPATIENT
Start: 2022-04-14 | End: 2022-04-19 | Stop reason: HOSPADM

## 2022-04-14 RX ORDER — ONDANSETRON 4 MG/1
4 TABLET, ORALLY DISINTEGRATING ORAL EVERY 8 HOURS PRN
Status: DISCONTINUED | OUTPATIENT
Start: 2022-04-14 | End: 2022-04-19 | Stop reason: HOSPADM

## 2022-04-14 RX ORDER — METOPROLOL TARTRATE 5 MG/5ML
10 INJECTION INTRAVENOUS ONCE
Status: COMPLETED | OUTPATIENT
Start: 2022-04-14 | End: 2022-04-14

## 2022-04-14 RX ORDER — ONDANSETRON 2 MG/ML
4 INJECTION INTRAMUSCULAR; INTRAVENOUS EVERY 6 HOURS PRN
Status: DISCONTINUED | OUTPATIENT
Start: 2022-04-14 | End: 2022-04-19 | Stop reason: HOSPADM

## 2022-04-14 RX ORDER — CARBIDOPA AND LEVODOPA 25; 100 MG/1; MG/1
2 TABLET, EXTENDED RELEASE ORAL 3 TIMES DAILY
Status: DISCONTINUED | OUTPATIENT
Start: 2022-04-14 | End: 2022-04-19 | Stop reason: HOSPADM

## 2022-04-14 RX ADMIN — ARFORMOTEROL TARTRATE 15 MCG: 15 SOLUTION RESPIRATORY (INHALATION) at 22:53

## 2022-04-14 RX ADMIN — Medication 10 ML: at 22:47

## 2022-04-14 RX ADMIN — ASPIRIN 81 MG: 81 TABLET, COATED ORAL at 22:47

## 2022-04-14 RX ADMIN — MONTELUKAST SODIUM 10 MG: 10 TABLET, FILM COATED ORAL at 22:46

## 2022-04-14 RX ADMIN — BUDESONIDE 500 MCG: 0.5 SUSPENSION RESPIRATORY (INHALATION) at 22:53

## 2022-04-14 RX ADMIN — METOPROLOL TARTRATE 10 MG: 5 INJECTION INTRAVENOUS at 16:02

## 2022-04-14 RX ADMIN — INSULIN LISPRO 1 UNITS: 100 INJECTION, SOLUTION INTRAVENOUS; SUBCUTANEOUS at 23:19

## 2022-04-14 RX ADMIN — METOPROLOL SUCCINATE 25 MG: 25 TABLET, EXTENDED RELEASE ORAL at 22:46

## 2022-04-14 RX ADMIN — DOXEPIN HYDROCHLORIDE 10 MG: 10 CAPSULE ORAL at 22:47

## 2022-04-14 RX ADMIN — INSULIN GLARGINE 10 UNITS: 100 INJECTION, SOLUTION SUBCUTANEOUS at 23:20

## 2022-04-14 RX ADMIN — Medication 1000 UNITS: at 22:46

## 2022-04-14 RX ADMIN — ENOXAPARIN SODIUM 100 MG: 100 INJECTION SUBCUTANEOUS at 22:47

## 2022-04-14 RX ADMIN — FUROSEMIDE 20 MG: 10 INJECTION, SOLUTION INTRAMUSCULAR; INTRAVENOUS at 17:26

## 2022-04-14 RX ADMIN — ACETAMINOPHEN 1000 MG: 500 TABLET ORAL at 16:00

## 2022-04-14 RX ADMIN — BUSPIRONE HYDROCHLORIDE 5 MG: 5 TABLET ORAL at 22:47

## 2022-04-14 RX ADMIN — CARBIDOPA AND LEVODOPA 2 TABLET: 25; 100 TABLET, EXTENDED RELEASE ORAL at 22:47

## 2022-04-14 RX ADMIN — ROPINIROLE HYDROCHLORIDE 1 MG: 1 TABLET, FILM COATED ORAL at 22:47

## 2022-04-14 ASSESSMENT — PAIN SCALES - GENERAL
PAINLEVEL_OUTOF10: 0
PAINLEVEL_OUTOF10: 5

## 2022-04-14 ASSESSMENT — ENCOUNTER SYMPTOMS
RHINORRHEA: 0
NAUSEA: 0
PHOTOPHOBIA: 0
COUGH: 0
BACK PAIN: 1
TROUBLE SWALLOWING: 0
DIARRHEA: 0
SHORTNESS OF BREATH: 1
ABDOMINAL PAIN: 0
VOMITING: 0
VOICE CHANGE: 0

## 2022-04-14 NOTE — PROGRESS NOTES
Admission database completed to best of this RN's ability. Care plan and education initiated. Pt from home. Independent with all ADLs. Uses a rolator or quad cane with ambulation, has nebulizer and glucometer. Denies any [place] [id number] services prior to admission.

## 2022-04-14 NOTE — H&P
[facility]   History and Physical      CHIEF COMPLAINT:  dizziness    History of Present Illness:  66 y.o. female who presents from home for evaluation of dizziness with subsequent fall today. She sates she hit her head but denies LOC. She denies chest pain or SOB at the time of the fall. Ekg was obtained and showed patient to be in atrial fibrillation with RVR of 113. This is a new diagnosis for her. She states in the past week or so she felt palpitations that were new to her. CTH was negative. CXR showed vascular congestion. She was given 20 mg Lasix in the ER, 10mg IV lopressor. States blood sugars have been controlled and she is complaint with her medications. Patient was recently admitted to [facility] for asthma exacerbation and discharged 03/19/22. She was admitted to same  January 9,2022 with chest pain and had Left Heart catheterization, coronary angiography, left ventriculography after abnormal stress test. Heart cath results showed LAD: 50 %  stenosis, ciircumflex: 50 % stenosis, and LV angio: 75-80%  ejection fraction      Informant(s) for H&P: patient     REVIEW OF SYSTEMS:  Denies CP, SOB subjective fever/chills, cough, congestion, n/v/d, ha, vision/hearing changes, wt changes, hot/cold flashes, other open skin lesions, constipation, dysuria/hematuria unless noted in HPI. Complete ROS performed with the patient and is otherwise negative.       PMH:  Past Medical History:   Diagnosis Date    Anxiety     Asthma     CAD (coronary artery disease) 01/21/2016    Cancer [facility])  breast ca 2006    right lumpectomy    Chronic kidney disease     nephrolithiasis    Depression     Diabetes mellitus ([facility] [address]     H/O mammogram     Hx MRSA infection     toe infection january 2012    Hyperlipidemia     Hypertension     Lateral epicondylitis     SERENA on CPAP     Parkinson's disease ([facility] [address]     Tubal ligation status        Surgical History:  Past Surgical History:   Procedure Laterality Date    BREAST LUMPECTOMY      BREAST REDUCTION SURGERY      CARDIAC CATHETERIZATION  4/28/2014    Dr. James Hairston  01/11/2022    Dr. Dominique Castro  7/29/15    ENDOSCOPY, COLON, DIAGNOSTIC  7/19/15    GALLBLADDER SURGERY      LUMBAR LAMINECTOMY      TOE AMPUTATION      TONSILLECTOMY      UPPER GASTROINTESTINAL ENDOSCOPY         Medications Prior to Admission:    Prior to Admission medications    Medication Sig Start Date End Date Taking? Authorizing Provider   budesonide-formoterol (SYMBICORT) 160-4.5 MCG/ACT AERO Inhale 2 puffs into the lungs 2 times daily 3/19/22   [address], DO   ipratropium-albuterol (DUONEB) 0.5-2.5 (3) MG/3ML SOLN nebulizer solution Inhale 3 mLs into the lungs every 4 hours as needed for Shortness of Breath 3/19/22   Ely Lugo, DO   albuterol sulfate HFA (PROVENTIL HFA) 108 (90 Base) MCG/ACT inhaler Inhale 2 puffs into the lungs every 6 hours as needed for Wheezing 3/19/22   Eulalio Lugo,    rOPINIRole (REQUIP) 1 MG tablet Take 1 tablet by mouth 3 times daily 2/8/22   RASHARD Tafoya CNP   carbidopa-levodopa (SINEMET CR)  MG per extended release tablet Take 1 tablet by mouth 3 times daily 1/26/22   RASHARD Tafoya CNP   isosorbide mononitrate (IMDUR) 30 MG extended release tablet Take 1 tablet by mouth daily  Patient not taking: Reported on 4/14/2022 1/11/22   Damaris Rodriguez MD   nitroGLYCERIN (NITROSTAT) 0.4 MG SL tablet up to max of 3 total doses. If no relief after 1 dose, call 911.   Patient not taking: Reported on 3/17/2022 1/11/22   Damaris Rodriguez MD   busPIRone (BUSPAR) 5 MG tablet Take 1 tablet by mouth 3 times daily  Patient not taking: Reported on 4/14/2022 1/11/22   Damaris Rodriguez MD   insulin glargine (LANTUS) 100 UNIT/ML injection vial Inject 10 Units into the skin nightly 1/11/22   Damaris Rodriguez MD   metoprolol succinate (TOPROL XL) 25 MG extended release tablet Take 1 tablet by mouth daily 1/12/22   Jose Hebert MD   amLODIPine (NORVASC) 10 MG tablet Take 1 tablet by mouth daily  Patient not taking: Reported on 4/14/2022 1/12/22   Jose Hebert MD   montelukast (SINGULAIR) 10 MG tablet Take 10 mg by mouth nightly    Historical Provider, MD   ALPRAZolam Ary Pereira) 0.5 MG tablet Take 0.5 mg by mouth daily as needed for Anxiety. Historical Provider, MD   meclizine (ANTIVERT) 25 MG tablet Take 25 mg by mouth 3 times daily as needed for Dizziness    Historical Provider, MD   Multiple Vitamin (MULTIVITAMIN) TABS tablet Take 1 tablet by mouth daily  Patient not taking: Reported on 3/17/2022 10/26/21   RASHARD Ibarra CNP   Cholecalciferol (VITAMIN D) 25 MCG TABS Take 1 tablet by mouth daily  Patient not taking: Reported on 4/14/2022 10/26/21   RASHARD Ibarra CNP   doxepin (SINEQUAN) 10 MG capsule Take 10 mg by mouth nightly    Historical Provider, MD   venlafaxine (EFFEXOR XR) 150 MG extended release capsule Take 150 mg by mouth daily    Historical Provider, MD   losartan (COZAAR) 50 MG tablet Take 50 mg by mouth daily    Historical Provider, MD   omeprazole (PRILOSEC) 40 MG delayed release capsule Take 40 mg by mouth daily     Historical Provider, MD   blood glucose test strips (ACCU-CHEK RIGOBERTO PLUS) strip 1 each by In Vitro route 2 times daily Indications: DISP ACCU-CHEK As needed. Historical Provider, MD   lamoTRIgine (LAMICTAL) 200 MG tablet Take 200 mg by mouth daily  10/18/19   Historical Provider, MD   atorvastatin (LIPITOR) 20 MG tablet Take 1 tablet by mouth daily 10/21/19   Terry Sheehan MD   insulin aspart (NOVOLOG FLEXPEN) 100 UNIT/ML injection pen INJECT 0-10 UNITS INTO THE SKIN THREE TIMES DAILY(BEFORE MEALS) SLIDING SCALE (MAX DAILY 30 UNITS)  Patient taking differently: Indications: med.  approved 6/1/19-7/29/20 INJECT 0-10 UNITS INTO THE SKIN THREE TIMES DAILY(BEFORE MEALS) SLIDING SCALE (MAX DAILY 30 UNITS) 7/29/19   Amy White MD   aspirin 81 MG tablet Take 81 mg by mouth nightly    Historical Provider, MD       Allergies:    Ciprofloxacin and Codeine    Social History:    reports that she has never smoked. She has never used smokeless tobacco. She reports that she does not drink alcohol and does not use drugs. Family History:   family history includes Cancer in her father and mother; Diabetes in her sister; Heart Disease in her sister; Other in her brother; Seizures in her son. PHYSICAL EXAM:  Vitals:  /75   Pulse 99   Temp 97.9 °F (36.6 °C)   Resp 19   Ht 5' (1.524 m)   Wt 220 lb (99.8 kg)   SpO2 92%   BMI 42.97 kg/m²     General Appearance: alert and oriented to person, place and time and in no acute distress  Skin: warm and dry  Head: normocephalic and atraumatic  Eyes: pupils equal, round, and reactive to light, extraocular eye movements intact, conjunctivae normal  Neck: neck supple and non tender without mass   Pulmonary/Chest: clear to auscultation bilaterally- no wheezes, rales or rhonchi, normal air movement, no respiratory distress  Cardiovascular: normal rate, irregular rhythm, normal S1 and S2 and no carotid bruits  Abdomen: soft, non-tender, non-distended, normal bowel sounds, no masses or organomegaly  Extremities: no cyanosis, no clubbing and no edema  Neurologic: no cranial nerve deficit and speech normal    LABS:  Recent Labs     04/14/22  1557      K 4.0      CO2 28   BUN 12   CREATININE 1.2*   GLUCOSE 146*   CALCIUM 8.5*       Recent Labs     04/14/22  1557   WBC 4.9   RBC 3.86   HGB 10.0*   HCT 34.2   MCV 88.6   MCH 25.9*   MCHC 29.2*   RDW 16.6*      MPV 9.8       No results for input(s): POCGLU in the last 72 hours.         I reviewed all labs and diagnostic images        Radiology: CT Head WO Contrast    Result Date: 4/14/2022  EXAMINATION: CT OF THE HEAD WITHOUT CONTRAST  4/14/2022 3:10 pm TECHNIQUE: CT of the head was performed without the administration of intravenous contrast. Dose modulation, iterative reconstruction, and/or weight based adjustment of the mA/kV was utilized to reduce the radiation dose to as low as reasonably achievable. COMPARISON: March 06/20/2022 HISTORY: ORDERING SYSTEM PROVIDED HISTORY: fall TECHNOLOGIST PROVIDED HISTORY: Reason for exam:->fall Has a \"code stroke\" or \"stroke alert\" been called? ->No Decision Support Exception - unselect if not a suspected or confirmed emergency medical condition->Emergency Medical Condition (MA) FINDINGS: BRAIN/VENTRICLES: There are mild age related cortical atrophy and periventricular white matter ischemic changes. There is no acute intracranial hemorrhage, mass effect or midline shift. No abnormal extra-axial fluid collection. The gray-white differentiation is maintained without evidence of an acute infarct. There is no evidence of hydrocephalus. ORBITS: The visualized portion of the orbits demonstrate no acute abnormality. SINUSES: The visualized paranasal sinuses and mastoid air cells demonstrate no acute abnormality. SOFT TISSUES/SKULL:  No acute abnormality of the visualized skull or soft tissues. No acute intracranial abnormality or hemorrhage. CT Cervical Spine WO Contrast    Result Date: 4/14/2022  EXAMINATION: CT OF THE CERVICAL SPINE WITHOUT CONTRAST 4/14/2022 3:10 pm TECHNIQUE: CT of the cervical spine was performed without the administration of intravenous contrast. Multiplanar reformatted images are provided for review. Dose modulation, iterative reconstruction, and/or weight based adjustment of the mA/kV was utilized to reduce the radiation dose to as low as reasonably achievable.  COMPARISON: March 26, 2022 HISTORY: ORDERING SYSTEM PROVIDED HISTORY: fall TECHNOLOGIST PROVIDED HISTORY: Reason for exam:->fall Decision Support Exception - unselect if not a suspected or confirmed emergency medical condition->Emergency Medical Condition (MA) FINDINGS: BONES/ALIGNMENT: There is no acute fracture or traumatic malalignment. There is straightening of normal lordotic curvature likely due to muscular spasm and/or positioning of the neck. DEGENERATIVE CHANGES: No significant degenerative changes. SOFT TISSUES: There is no prevertebral soft tissue swelling. No acute abnormality of the cervical spine. XR CHEST PORTABLE    Result Date: 4/14/2022  EXAMINATION: ONE XRAY VIEW OF THE CHEST PORTABLE UPRIGHT 4/14/2022 3:10 pm COMPARISON: 03/16/2022 HISTORY: ORDERING SYSTEM PROVIDED HISTORY: left rib pain TECHNOLOGIST PROVIDED HISTORY: Reason for exam:->left rib pain FINDINGS: Chest evaluation partly limited by portable technique and large body habitus. Mild cardiomegaly, unchanged. Cephalization of the pulmonary vascularity within the right upper lobe in keeping with mild pulmonary venous congestion. No focal infiltrate or pleural effusion. No pneumothorax. Cardiomegaly and mild pulmonary venous congestion. No pneumonia. EKG:       ASSESSMENT:      Principal Problem:    New onset a-fib (HCC)  Active Problems:    Asthma    Hyperlipidemia    Obstructive sleep apnea syndrome    Diabetes mellitus ([facility] [address]    Parkinson's disease ([facility] [address]    Primary hypertension  Resolved Problems:    * No resolved hospital problems. *      PLAN:     1. afib RVR, new onset: Consult cardiology. TSH 1.030. Weight based lovenox BID  2. CAD: on statin , ASA. Left heart cath Jan 2022 with 50% stenosis   3. DM2: A1c 6.9. Resume home Lantus, add arb conrol diet, low dose insulin slide scale, hypoglycemia protocol  4. HTN: resume Imdur, amlodipine, metoprolol, losaratn  5. Parkinson's: resume home sinemet  6. HLD: resume home statin  7. SERENA: PAP  8.  Asthma: resume home meds    Code Status: full  DVT prophylaxis: lovenox SC         Electronically signed by RASHARD Croft CNP on 4/14/2022 at 5:51 PM

## 2022-04-14 NOTE — ED PROVIDER NOTES
ATTENDING PROVIDER ATTESTATION:     Julia Navarrete presented to the emergency department for evaluation of Fall (dizzy and fell, no LOC but hit the back of the head, no blood thinner ) and Dizziness (hx of vertigo )   and was initially evaluated by the Medical Resident. See Original ED Note for H&P and ED course above. I have reviewed and discussed the case, including pertinent history (medical, surgical, family and social) and exam findings with the Medical Resident assigned to Julia Navarrete. I have personally performed and/or participated in the history, exam, medical decision making, and procedures and agree with all pertinent clinical information. I, Dr. Lawyer Denisse MD, am the primary provider of this record. I have reviewed my findings and recommendations with the assigned Medical Resident, Julia Navarrete and members of family present at the time of disposition. My findings/plan: The primary encounter diagnosis was New onset a-fib ([address] Diagnoses of Acute on chronic congestive heart failure, unspecified heart failure type ([address] and Acute renal insufficiency were also pertinent to this visit. Current Discharge Medication List        Lawyer Denisse MD    HPI   Patient is a 67-year-old female with medical history of hyperlipidemia, diabetes, Parkinson's, hypertension, obesity and hypothyroidism presenting the emergency department due to acute dizziness. Patient states that her symptoms started this afternoon. Symptoms have been moderate in severity, constant, no exacerbating or alleviating factors. Reports associated symptoms of lower extremity swelling. She was standings talking to someone when she started to feel acutely dizzy. Patient denies any inciting factors for her symptoms. Patient did fall as a result of her dizziness. She states that she hit the back of her head when she fell. Patient denies any loss of consciousness. No blood thinner use.   She has some soreness over the back of the head, thoracic spine and her left lateral rib cage but denies any pain anywhere else. On gross inspection, patient has no obvious external injuries or deformities to the head, neck, chest, trunk or extremities. Able to move all 4 extremities without difficulty. Patient does admit to history of vertigo and was previously on meclizine. She states that her PCP has not prescribed her medication for a while for this. Patient endorsing new shortness of breath that has been present over the past week as well. This is not particularly worse with exertion. Patient otherwise denying any fever, chills, chest pain, nausea, vomiting, headache, abdominal pain, urinary symptoms, constipation or diarrhea. Her symptoms are moderate in severity with no remitting or exacerbating factors. Patient states that she is only mildly dizzy on initial exam in the ED. Review of Systems   Constitutional: Negative for chills, fatigue and fever. HENT: Negative for congestion, rhinorrhea, trouble swallowing and voice change. Eyes: Negative for photophobia and visual disturbance. Respiratory: Positive for shortness of breath. Negative for cough. Cardiovascular: Negative for chest pain and palpitations. Gastrointestinal: Negative for abdominal pain, diarrhea, nausea and vomiting. Genitourinary: Negative for dysuria. Musculoskeletal: Positive for back pain. Negative for arthralgias. Thoracic back pain. Skin: Negative for rash and wound. Neurological: Positive for dizziness. Negative for headaches. Psychiatric/Behavioral: Negative for behavioral problems and confusion. Physical Exam  Constitutional:       General: She is not in acute distress. Appearance: Normal appearance. She is not ill-appearing. HENT:      Head: Normocephalic and atraumatic.       Right Ear: External ear normal.      Left Ear: External ear normal.      Nose: Nose normal.      Mouth/Throat: Mouth: Mucous membranes are moist.      Pharynx: Oropharynx is clear. Eyes:      Conjunctiva/sclera: Conjunctivae normal.      Pupils: Pupils are equal, round, and reactive to light. Comments: No rotary nystagmus. Cardiovascular:      Rate and Rhythm: Normal rate and regular rhythm. Pulses: Normal pulses. Heart sounds: Normal heart sounds. Pulmonary:      Effort: Pulmonary effort is normal. No respiratory distress. Breath sounds: Normal breath sounds. No wheezing or rales. Abdominal:      General: Abdomen is flat. Palpations: Abdomen is soft. Tenderness: There is no abdominal tenderness. There is no guarding or rebound. Musculoskeletal:         General: Normal range of motion. Cervical back: Normal range of motion and neck supple. Right lower leg: Edema present. Left lower leg: Edema present. Comments: 2-3+ pitting edema of the bilateral lower extremities. Skin:     General: Skin is warm and dry. Capillary Refill: Capillary refill takes less than 2 seconds. Neurological:      General: No focal deficit present. Mental Status: She is alert and oriented to person, place, and time. Cranial Nerves: No cranial nerve deficit. Coordination: Coordination normal.   Psychiatric:         Mood and Affect: Mood normal.         Behavior: Behavior normal.          Procedures    EKG: This EKG is signed and interpreted by me. MICKI guillen with RVR. Ventricular rate 113 bpm.  SC interval undetermined. QTc not prolonged. Normal axis. Low voltage EKG. No evidence of acute ST elevation MI. Nonspecific ST wave abnormalities. A. fib compared to previous EKG. MDM   Patient is a 69-year-old female with past medical history of hyperlipidemia, diabetes, Parkinson's, hypertension, obesity and hypothyroidism presenting the ED due to acute dizziness. Patient symptoms started this afternoon without any inciting factors.   On initial evaluation in the ED, patient states that her dizziness was much improved. Work-up in the emergency department significant for new onset A. fib with RVR. Patient has no history of A. fib has not been on anticoagulation. She also appeared fluid overloaded on physical exam.  Patient had 2-3+ pitting edema of the bilateral lower extremities. Lungs with diffusely diminished breath sounds likely exacerbated by body habitus. Chest x-ray showing cardiomegaly and pulmonary vascular edema. CMP showing acute renal insufficiency with creatinine 1.2/BUN 12. This is elevated compared to her baseline Cr of 0.9. Patient given Lopressor, Lasix and Tylenol in the ED. Her blood pressure remained stable and patient's heart rate improved while being monitored in the department. Plan to admit the patient for further work-up and evaluation of new onset A. fib and acute on chronic congestive heart failure exacerbation. Patient agreeable with plan for admission. Spoke with Dr. Wilfred Day who accepted the patient. ED Course as of 04/14/22 2331   Thu Apr 14, 2022   2879 EKG: This EKG is signed and interpreted by me. Rate: 113  Rhythm: Atrial fibrillation  Interpretation: Atrial fibrillation with rapid ventricular response, low voltage QRS, normal QTC, no acute ST or T wave changes  Comparison: changes compared to previous EKG-normal sinus rhythm on previous EKG   [JA]   1507 I reviewed the patient's chart. ECHO 1/11/22:   Left ventricle is normal in size. Moderate concentric left ventricular hypertrophy. No regional wall motion abnormalities seen. Ejection fraction is visually estimated at 70+/-5%. There is doppler evidence of stage I diastolic dysfunction. Mildly dilated right ventricle. Right ventricle global systolic function is normal ( TAPSE = 1.8 ). Normal size atria. No significant valvular abnormalities noted. [JA]   1508 Cardiac Catheterization Jan 2022:  1. Coronary artery disease:  A. Left main.   No significant disease. B.  LAD. Heavily calcified proximal and mid vessel with 50% mid vessel  stenosis. 60% proximal stenosis of a moderate size first diagonal  branch. C.  LCX. 50% ostial narrowing of the AV groove branch in the mid vessel  which is a bifurcation lesion with a large marginal branch which itself  did not appear to have any significant disease. The AV groove branch of  the left circumflex continues to provide a posterior descending artery  branch and the posterolateral branch (codominant vessel). D.  RCA. Codominant vessel with no significant angiographic disease. 2.  Normal left ventricular size with hyperdynamic left ventricular  systolic function with an estimated ejection fraction of 75%. 3.  Systemic hypertension. 4.  Elevated left ventricular end-diastolic pressure. [JA]   1534 Head and neck CT unremarkable. Chest x-ray showing pulmonary vascular congestion suggesting fluid overloaded. [PP]   6036 Patient is a 25-year-old female presenting after fall. Patient states she is feeling dizzy just prior to arrival and subsequently fell striking her head. She is unsure if she lost consciousness. States has been having increased swelling to her lower extremities and generalized fatigue. No focal neurologic deficits. No fevers, chest pain, shortness of breath, abdominal pain, emesis, or diarrhea. On examination, patient was found to be in A. fib with RVR. She denies prior history of A. fib. She has no nuchal rigidity or meningeal signs. She has no focal neurologic deficits or ataxia. No abdominal tenderness. She has edema to her lower extremities with soft compartments and intact pulses. She has diminished breath sounds. Work-up is pending. Patient does appear to be fluid overloaded. Will treat with Tylenol, rate control, and diuresis. [JA]   2059 Reevaluated. Still A. fib on the monitor. Heart rate between 95 and 110.   She notes that she is feeling little better but still very fatigued. He is denying any chest pain or shortness of breath at this time. [PP]   5 Spoke with Dr. Abhinav Abdullahi who accepted the patient for admission [PP]      ED Course User Index  [JA] Sukhi Lieberman MD  [PP] Benita Vizcaino, DO        --------------------------------------------- PAST HISTORY ---------------------------------------------  Past Medical History:  has a past medical history of Anxiety, Asthma, CAD (coronary artery disease), Cancer ([facility] Chronic kidney disease, Depression, Diabetes mellitus ([facility] H/O mammogram, Hx MRSA infection, Hyperlipidemia, Hypertension, Lateral epicondylitis, SERENA on CPAP, Parkinson's disease ([facility] and Tubal ligation status. Past Surgical History:  has a past surgical history that includes Gallbladder surgery; Toe amputation; Cholecystectomy; Colonoscopy (7/29/15); Endoscopy, colon, diagnostic (7/19/15); Upper gastrointestinal endoscopy; lumbar laminectomy; Tonsillectomy; Breast lumpectomy; Breast reduction surgery; Cardiac catheterization (4/28/2014); and Cardiac catheterization (01/11/2022). Social History:  reports that she has never smoked. She has never used smokeless tobacco. She reports that she does not drink alcohol and does not use drugs. Family History: family history includes Cancer in her father and mother; Diabetes in her sister; Heart Disease in her sister; Other in her brother; Seizures in her son. The patients home medications have been reviewed.     Allergies: Ciprofloxacin and Codeine    -------------------------------------------------- RESULTS -------------------------------------------------    LABS:  Results for orders placed or performed during the hospital encounter of 04/14/22   CBC with Auto Differential   Result Value Ref Range    WBC 4.9 4.5 - 11.5 E9/L    RBC 3.86 3.50 - 5.50 E12/L    Hemoglobin 10.0 (L) 11.5 - 15.5 g/dL    Hematocrit 34.2 34.0 - 48.0 %    MCV 88.6 80.0 - 99.9 fL    MCH 25.9 (L) 26.0 - 35.0 pg    MCHC 29.2 (L) 32.0 - 34.5 %    RDW 16.6 (H) 11.5 - 15.0 fL    Platelets 303 235 - 122 E9/L    MPV 9.8 7.0 - 12.0 fL    Neutrophils % 73.0 43.0 - 80.0 %    Immature Granulocytes % 0.6 0.0 - 5.0 %    Lymphocytes % 19.3 (L) 20.0 - 42.0 %    Monocytes % 5.7 2.0 - 12.0 %    Eosinophils % 0.8 0.0 - 6.0 %    Basophils % 0.6 0.0 - 2.0 %    Neutrophils Absolute 3.56 1.80 - 7.30 E9/L    Immature Granulocytes # 0.03 E9/L    Lymphocytes Absolute 0.94 (L) 1.50 - 4.00 E9/L    Monocytes Absolute 0.28 0.10 - 0.95 E9/L    Eosinophils Absolute 0.04 (L) 0.05 - 0.50 E9/L    Basophils Absolute 0.03 0.00 - 0.20 E9/L   Comprehensive Metabolic Panel w/ Reflex to MG   Result Value Ref Range    Sodium 138 132 - 146 mmol/L    Potassium reflex Magnesium 4.0 3.5 - 5.0 mmol/L    Chloride 101 98 - 107 mmol/L    CO2 28 22 - 29 mmol/L    Anion Gap 9 7 - 16 mmol/L    Glucose 146 (H) 74 - 99 mg/dL    BUN 12 6 - 23 mg/dL    CREATININE 1.2 (H) 0.5 - 1.0 mg/dL    GFR Non-African American 44 >=60 mL/min/1.73    GFR African American 53     Calcium 8.5 (L) 8.6 - 10.2 mg/dL    Total Protein 5.8 (L) 6.4 - 8.3 g/dL    Albumin 3.7 3.5 - 5.2 g/dL    Total Bilirubin 0.6 0.0 - 1.2 mg/dL    Alkaline Phosphatase 98 35 - 104 U/L    ALT <5 0 - 32 U/L    AST 7 0 - 31 U/L   Troponin   Result Value Ref Range    Troponin, High Sensitivity 21 (H) 0 - 9 ng/L   Urinalysis with Microscopic   Result Value Ref Range    Color, UA Yellow Straw/Yellow    Clarity, UA Clear Clear    Glucose, Ur Negative Negative mg/dL    Bilirubin Urine Negative Negative    Ketones, Urine Negative Negative mg/dL    Specific Gravity, UA 1.015 1.005 - 1.030    Blood, Urine Negative Negative    pH, UA 6.0 5.0 - 9.0    Protein, UA 30 (A) Negative mg/dL    Urobilinogen, Urine 0.2 <2.0 E.U./dL    Nitrite, Urine Negative Negative    Leukocyte Esterase, Urine Negative Negative    WBC, UA 0-1 0 - 5 /HPF    RBC, UA 0-1 0 - 2 /HPF    Bacteria, UA FEW (A) None Seen /HPF   TSH   Result Value Ref Range    TSH 1.030 0.270 - 4.200 uIU/mL   D-Dimer, Quantitative   Result Value Ref Range    D-Dimer, Quant <200 ng/mL DDU   Troponin   Result Value Ref Range    Troponin, High Sensitivity 20 (H) 0 - 9 ng/L   POCT Glucose   Result Value Ref Range    Meter Glucose 149 (H) 74 - 99 mg/dL   EKG 12 Lead   Result Value Ref Range    Ventricular Rate 113 BPM    Atrial Rate 74 BPM    QRS Duration 82 ms    Q-T Interval 316 ms    QTc Calculation (Bazett) 433 ms    R Axis 86 degrees    T Axis 9 degrees       RADIOLOGY:  CT Head WO Contrast   Final Result   No acute intracranial abnormality or hemorrhage. CT Cervical Spine WO Contrast   Final Result   No acute abnormality of the cervical spine. XR CHEST PORTABLE   Final Result   Cardiomegaly and mild pulmonary venous congestion. No pneumonia.             ------------------------- NURSING NOTES AND VITALS REVIEWED ---------------------------  Date / Time Roomed:  4/14/2022  2:45 PM  ED Bed Assignment:  [id number]    The nursing notes within the ED encounter and vital signs as below have been reviewed. Patient Vitals for the past 24 hrs:   BP Temp Temp src Pulse Resp SpO2 Height Weight   04/14/22 2130 116/65 97.2 °F (36.2 °C) Oral 108 18 94 % 5' (1.524 m) 230 lb (104.3 kg)   04/14/22 2050 115/77 97.9 °F (36.6 °C) Oral 104 16 92 % -- --   04/14/22 1725 114/75 97.9 °F (36.6 °C) -- 99 19 92 % -- --   04/14/22 1704 127/70 -- -- 103 -- -- -- --   04/14/22 1605 102/69 -- -- 111 19 91 % -- --   04/14/22 1505 116/62 98.2 °F (36.8 °C) Oral 116 17 92 % 5' (1.524 m) 220 lb (99.8 kg)       Oxygen Saturation Interpretation: Normal    ------------------------------------------ PROGRESS NOTES ------------------------------------------  Counseling:  I have spoken with the patient and discussed todays results, in addition to providing specific details for the plan of care and counseling regarding the diagnosis and prognosis.   Their questions are answered at this time and they are agreeable with the plan of admission.    --------------------------------- ADDITIONAL PROVIDER NOTES ---------------------------------  Consultations:  Time: 0288. Spoke with Dr. Pepe Coleman. Discussed case. They will admit the patient. This patient's ED course included: a personal history and physicial examination, re-evaluation prior to disposition, multiple bedside re-evaluations, IV medications, cardiac monitoring and continuous pulse oximetry    This patient has remained hemodynamically stable during their ED course. Diagnosis:  1. New onset a-fib ([facility]    2. Acute on chronic congestive heart failure, unspecified heart failure type ([address]    3. Acute renal insufficiency        Disposition:  Patient's disposition: Admit to telemetry  Patient's condition is stable.           Gagandeep Whitley DO  Resident  04/14/22 3000       Nanette Garland MD  04/14/22 0335

## 2022-04-15 ENCOUNTER — APPOINTMENT (OUTPATIENT)
Dept: GENERAL RADIOLOGY | Age: 72
DRG: 308 | End: 2022-04-15
Payer: MEDICARE

## 2022-04-15 LAB
ALBUMIN SERPL-MCNC: 3.9 G/DL (ref 3.5–5.2)
ALP BLD-CCNC: 110 U/L (ref 35–104)
ALT SERPL-CCNC: <5 U/L (ref 0–32)
ANION GAP SERPL CALCULATED.3IONS-SCNC: 9 MMOL/L (ref 7–16)
AST SERPL-CCNC: 8 U/L (ref 0–31)
BASOPHILS ABSOLUTE: 0.04 E9/L (ref 0–0.2)
BASOPHILS RELATIVE PERCENT: 0.7 % (ref 0–2)
BILIRUB SERPL-MCNC: 0.5 MG/DL (ref 0–1.2)
BUN BLDV-MCNC: 15 MG/DL (ref 6–23)
CALCIUM SERPL-MCNC: 8.7 MG/DL (ref 8.6–10.2)
CHLORIDE BLD-SCNC: 103 MMOL/L (ref 98–107)
CHOLESTEROL, TOTAL: 97 MG/DL (ref 0–199)
CO2: 29 MMOL/L (ref 22–29)
CREAT SERPL-MCNC: 1.3 MG/DL (ref 0.5–1)
EOSINOPHILS ABSOLUTE: 0.11 E9/L (ref 0.05–0.5)
EOSINOPHILS RELATIVE PERCENT: 1.9 % (ref 0–6)
GFR AFRICAN AMERICAN: 49
GFR NON-AFRICAN AMERICAN: 40 ML/MIN/1.73
GLUCOSE BLD-MCNC: 163 MG/DL (ref 74–99)
HCT VFR BLD CALC: 37.1 % (ref 34–48)
HDLC SERPL-MCNC: 46 MG/DL
HEMOGLOBIN: 11.1 G/DL (ref 11.5–15.5)
IMMATURE GRANULOCYTES #: 0.03 E9/L
IMMATURE GRANULOCYTES %: 0.5 % (ref 0–5)
INR BLD: 1.3
LDL CHOLESTEROL CALCULATED: 17 MG/DL (ref 0–99)
LYMPHOCYTES ABSOLUTE: 1.24 E9/L (ref 1.5–4)
LYMPHOCYTES RELATIVE PERCENT: 21.4 % (ref 20–42)
MAGNESIUM: 2 MG/DL (ref 1.6–2.6)
MCH RBC QN AUTO: 26.4 PG (ref 26–35)
MCHC RBC AUTO-ENTMCNC: 29.9 % (ref 32–34.5)
MCV RBC AUTO: 88.3 FL (ref 80–99.9)
METER GLUCOSE: 133 MG/DL (ref 74–99)
METER GLUCOSE: 142 MG/DL (ref 74–99)
METER GLUCOSE: 161 MG/DL (ref 74–99)
METER GLUCOSE: 165 MG/DL (ref 74–99)
MONOCYTES ABSOLUTE: 0.31 E9/L (ref 0.1–0.95)
MONOCYTES RELATIVE PERCENT: 5.4 % (ref 2–12)
NEUTROPHILS ABSOLUTE: 4.06 E9/L (ref 1.8–7.3)
NEUTROPHILS RELATIVE PERCENT: 70.1 % (ref 43–80)
PDW BLD-RTO: 17.1 FL (ref 11.5–15)
PLATELET # BLD: 235 E9/L (ref 130–450)
PMV BLD AUTO: 9.5 FL (ref 7–12)
POTASSIUM SERPL-SCNC: 4.3 MMOL/L (ref 3.5–5)
PRO-BNP: 3670 PG/ML (ref 0–125)
PROTHROMBIN TIME: 13.9 SEC (ref 9.3–12.4)
RBC # BLD: 4.2 E12/L (ref 3.5–5.5)
SODIUM BLD-SCNC: 141 MMOL/L (ref 132–146)
TOTAL PROTEIN: 6.3 G/DL (ref 6.4–8.3)
TRIGL SERPL-MCNC: 171 MG/DL (ref 0–149)
VLDLC SERPL CALC-MCNC: 34 MG/DL
WBC # BLD: 5.8 E9/L (ref 4.5–11.5)

## 2022-04-15 PROCEDURE — 74230 X-RAY XM SWLNG FUNCJ C+: CPT

## 2022-04-15 PROCEDURE — 92610 EVALUATE SWALLOWING FUNCTION: CPT | Performed by: SPEECH-LANGUAGE PATHOLOGIST

## 2022-04-15 PROCEDURE — 2500000003 HC RX 250 WO HCPCS: Performed by: RADIOLOGY

## 2022-04-15 PROCEDURE — 6370000000 HC RX 637 (ALT 250 FOR IP): Performed by: INTERNAL MEDICINE

## 2022-04-15 PROCEDURE — 93005 ELECTROCARDIOGRAM TRACING: CPT | Performed by: FAMILY MEDICINE

## 2022-04-15 PROCEDURE — 82962 GLUCOSE BLOOD TEST: CPT

## 2022-04-15 PROCEDURE — 85025 COMPLETE CBC W/AUTO DIFF WBC: CPT

## 2022-04-15 PROCEDURE — 80053 COMPREHEN METABOLIC PANEL: CPT

## 2022-04-15 PROCEDURE — 6370000000 HC RX 637 (ALT 250 FOR IP): Performed by: NURSE PRACTITIONER

## 2022-04-15 PROCEDURE — 92526 ORAL FUNCTION THERAPY: CPT | Performed by: SPEECH-LANGUAGE PATHOLOGIST

## 2022-04-15 PROCEDURE — 6360000002 HC RX W HCPCS: Performed by: NURSE PRACTITIONER

## 2022-04-15 PROCEDURE — 97165 OT EVAL LOW COMPLEX 30 MIN: CPT

## 2022-04-15 PROCEDURE — 83880 ASSAY OF NATRIURETIC PEPTIDE: CPT

## 2022-04-15 PROCEDURE — APPSS30 APP SPLIT SHARED TIME 16-30 MINUTES: Performed by: NURSE PRACTITIONER

## 2022-04-15 PROCEDURE — APPSS180 APP SPLIT SHARED TIME > 60 MINUTES: Performed by: CLINICAL NURSE SPECIALIST

## 2022-04-15 PROCEDURE — 80061 LIPID PANEL: CPT

## 2022-04-15 PROCEDURE — 85610 PROTHROMBIN TIME: CPT

## 2022-04-15 PROCEDURE — 94640 AIRWAY INHALATION TREATMENT: CPT

## 2022-04-15 PROCEDURE — 36415 COLL VENOUS BLD VENIPUNCTURE: CPT

## 2022-04-15 PROCEDURE — 99223 1ST HOSP IP/OBS HIGH 75: CPT | Performed by: INTERNAL MEDICINE

## 2022-04-15 PROCEDURE — 2580000003 HC RX 258: Performed by: NURSE PRACTITIONER

## 2022-04-15 PROCEDURE — 6360000002 HC RX W HCPCS: Performed by: INTERNAL MEDICINE

## 2022-04-15 PROCEDURE — 92611 MOTION FLUOROSCOPY/SWALLOW: CPT | Performed by: SPEECH-LANGUAGE PATHOLOGIST

## 2022-04-15 PROCEDURE — 97161 PT EVAL LOW COMPLEX 20 MIN: CPT

## 2022-04-15 PROCEDURE — 83735 ASSAY OF MAGNESIUM: CPT

## 2022-04-15 PROCEDURE — 99233 SBSQ HOSP IP/OBS HIGH 50: CPT | Performed by: FAMILY MEDICINE

## 2022-04-15 PROCEDURE — 6370000000 HC RX 637 (ALT 250 FOR IP): Performed by: CLINICAL NURSE SPECIALIST

## 2022-04-15 PROCEDURE — 2060000000 HC ICU INTERMEDIATE R&B

## 2022-04-15 RX ORDER — FUROSEMIDE 10 MG/ML
40 INJECTION INTRAMUSCULAR; INTRAVENOUS ONCE
Status: DISCONTINUED | OUTPATIENT
Start: 2022-04-15 | End: 2022-04-15

## 2022-04-15 RX ORDER — AMLODIPINE BESYLATE 5 MG/1
5 TABLET ORAL DAILY
Status: DISCONTINUED | OUTPATIENT
Start: 2022-04-15 | End: 2022-04-15

## 2022-04-15 RX ORDER — METOPROLOL SUCCINATE 25 MG/1
25 TABLET, EXTENDED RELEASE ORAL 2 TIMES DAILY
Status: DISCONTINUED | OUTPATIENT
Start: 2022-04-15 | End: 2022-04-19 | Stop reason: HOSPADM

## 2022-04-15 RX ORDER — FUROSEMIDE 10 MG/ML
20 INJECTION INTRAMUSCULAR; INTRAVENOUS ONCE
Status: COMPLETED | OUTPATIENT
Start: 2022-04-15 | End: 2022-04-15

## 2022-04-15 RX ORDER — DIGOXIN 0.25 MG/ML
125 INJECTION INTRAMUSCULAR; INTRAVENOUS DAILY
Status: DISCONTINUED | OUTPATIENT
Start: 2022-04-15 | End: 2022-04-16

## 2022-04-15 RX ORDER — DIGOXIN 0.25 MG/ML
250 INJECTION INTRAMUSCULAR; INTRAVENOUS ONCE
Status: COMPLETED | OUTPATIENT
Start: 2022-04-15 | End: 2022-04-15

## 2022-04-15 RX ADMIN — ATORVASTATIN CALCIUM 20 MG: 40 TABLET, FILM COATED ORAL at 20:05

## 2022-04-15 RX ADMIN — DIGOXIN 250 MCG: 0.25 INJECTION INTRAMUSCULAR; INTRAVENOUS at 20:05

## 2022-04-15 RX ADMIN — MONTELUKAST SODIUM 10 MG: 10 TABLET, FILM COATED ORAL at 20:04

## 2022-04-15 RX ADMIN — APIXABAN 5 MG: 5 TABLET, FILM COATED ORAL at 20:05

## 2022-04-15 RX ADMIN — LAMOTRIGINE 200 MG: 100 TABLET ORAL at 08:07

## 2022-04-15 RX ADMIN — BARIUM SULFATE 10 ML: 400 PASTE ORAL at 12:54

## 2022-04-15 RX ADMIN — BARIUM SULFATE 70 G: 0.81 POWDER, FOR SUSPENSION ORAL at 12:55

## 2022-04-15 RX ADMIN — METOPROLOL SUCCINATE 25 MG: 25 TABLET, FILM COATED, EXTENDED RELEASE ORAL at 22:07

## 2022-04-15 RX ADMIN — CARBIDOPA AND LEVODOPA 2 TABLET: 25; 100 TABLET, EXTENDED RELEASE ORAL at 20:05

## 2022-04-15 RX ADMIN — METOPROLOL SUCCINATE 25 MG: 25 TABLET, EXTENDED RELEASE ORAL at 08:06

## 2022-04-15 RX ADMIN — BUSPIRONE HYDROCHLORIDE 5 MG: 5 TABLET ORAL at 14:48

## 2022-04-15 RX ADMIN — DOXEPIN HYDROCHLORIDE 10 MG: 10 CAPSULE ORAL at 20:04

## 2022-04-15 RX ADMIN — ROPINIROLE HYDROCHLORIDE 1 MG: 1 TABLET, FILM COATED ORAL at 20:04

## 2022-04-15 RX ADMIN — Medication 1000 UNITS: at 08:07

## 2022-04-15 RX ADMIN — Medication 10 ML: at 09:53

## 2022-04-15 RX ADMIN — DILTIAZEM HYDROCHLORIDE 60 MG: 30 TABLET, FILM COATED ORAL at 20:07

## 2022-04-15 RX ADMIN — INSULIN LISPRO 1 UNITS: 100 INJECTION, SOLUTION INTRAVENOUS; SUBCUTANEOUS at 06:01

## 2022-04-15 RX ADMIN — CARBIDOPA AND LEVODOPA 2 TABLET: 25; 100 TABLET, EXTENDED RELEASE ORAL at 08:07

## 2022-04-15 RX ADMIN — LOSARTAN POTASSIUM 50 MG: 50 TABLET, FILM COATED ORAL at 10:19

## 2022-04-15 RX ADMIN — CARBIDOPA AND LEVODOPA 2 TABLET: 25; 100 TABLET, EXTENDED RELEASE ORAL at 14:48

## 2022-04-15 RX ADMIN — INSULIN LISPRO 1 UNITS: 100 INJECTION, SOLUTION INTRAVENOUS; SUBCUTANEOUS at 17:25

## 2022-04-15 RX ADMIN — BUDESONIDE 500 MCG: 0.5 SUSPENSION RESPIRATORY (INHALATION) at 08:29

## 2022-04-15 RX ADMIN — BUSPIRONE HYDROCHLORIDE 5 MG: 5 TABLET ORAL at 08:07

## 2022-04-15 RX ADMIN — ROPINIROLE HYDROCHLORIDE 1 MG: 1 TABLET, FILM COATED ORAL at 08:07

## 2022-04-15 RX ADMIN — DIGOXIN 125 MCG: 0.25 INJECTION INTRAMUSCULAR; INTRAVENOUS at 16:30

## 2022-04-15 RX ADMIN — FUROSEMIDE 20 MG: 10 INJECTION, SOLUTION INTRAMUSCULAR; INTRAVENOUS at 11:21

## 2022-04-15 RX ADMIN — BUDESONIDE 500 MCG: 0.5 SUSPENSION RESPIRATORY (INHALATION) at 20:51

## 2022-04-15 RX ADMIN — VENLAFAXINE HYDROCHLORIDE 150 MG: 150 CAPSULE, EXTENDED RELEASE ORAL at 08:07

## 2022-04-15 RX ADMIN — PANTOPRAZOLE SODIUM 40 MG: 40 TABLET, DELAYED RELEASE ORAL at 05:54

## 2022-04-15 RX ADMIN — ENOXAPARIN SODIUM 100 MG: 100 INJECTION SUBCUTANEOUS at 08:09

## 2022-04-15 RX ADMIN — BARIUM SULFATE 120 ML: 400 SUSPENSION ORAL at 12:54

## 2022-04-15 RX ADMIN — ARFORMOTEROL TARTRATE 15 MCG: 15 SOLUTION RESPIRATORY (INHALATION) at 08:29

## 2022-04-15 RX ADMIN — ARFORMOTEROL TARTRATE 15 MCG: 15 SOLUTION RESPIRATORY (INHALATION) at 20:51

## 2022-04-15 RX ADMIN — ROPINIROLE HYDROCHLORIDE 1 MG: 1 TABLET, FILM COATED ORAL at 14:48

## 2022-04-15 RX ADMIN — Medication 10 ML: at 20:04

## 2022-04-15 RX ADMIN — BUSPIRONE HYDROCHLORIDE 5 MG: 5 TABLET ORAL at 20:05

## 2022-04-15 RX ADMIN — AMLODIPINE BESYLATE 5 MG: 5 TABLET ORAL at 10:19

## 2022-04-15 RX ADMIN — ISOSORBIDE MONONITRATE 30 MG: 30 TABLET, EXTENDED RELEASE ORAL at 10:19

## 2022-04-15 ASSESSMENT — PAIN SCALES - GENERAL
PAINLEVEL_OUTOF10: 0
PAINLEVEL_OUTOF10: 0

## 2022-04-15 NOTE — PROGRESS NOTES
[address] [place], [place] [address]ist   Progress Note    Admitting Date and Time: 4/14/2022  2:45 PM  Admit Dx: Acute renal insufficiency [N28.9]  New onset a-fib ([facility] [I48.91]  Atrial fibrillation with RVR ([facility] [I48.91]  Acute on chronic congestive heart failure, unspecified heart failure type ([facility] [I50.9]    Subjective:    Patient seen and examined. On phone this am. Offers no complaints. Per RN, remained in afib RVR overnight. On bedside telemetry. Patient denies CP, SOB. Having hypotension. Hold am antihypertensives except metoprolol    ROS:   Denies CP, SOB, subjective fever, chills, N/V/D, abdominal pain,  HA. All systems reviewed and negative unless otherwise documented.       amLODIPine  10 mg Oral Daily    aspirin  81 mg Oral Nightly    atorvastatin  20 mg Oral Daily    busPIRone  5 mg Oral TID    insulin lispro  0-6 Units SubCUTAneous TID WC    insulin lispro  0-3 Units SubCUTAneous Nightly    Vitamin D  1,000 Units Oral Daily    doxepin  10 mg Oral Nightly    insulin glargine  10 Units SubCUTAneous Nightly    isosorbide mononitrate  30 mg Oral Daily    lamoTRIgine  200 mg Oral Daily    losartan  50 mg Oral Daily    metoprolol succinate  25 mg Oral Daily    montelukast  10 mg Oral Nightly    pantoprazole  40 mg Oral QAM AC    rOPINIRole  1 mg Oral TID    venlafaxine  150 mg Oral Daily    sodium chloride flush  5-40 mL IntraVENous 2 times per day    enoxaparin  1 mg/kg SubCUTAneous BID    Arformoterol Tartrate  15 mcg Nebulization BID    And    budesonide  0.5 mg Nebulization BID    carbidopa-levodopa  2 tablet Oral TID     ALPRAZolam, 0.5 mg, Daily PRN  glucose, 15 g, PRN  glucagon (rDNA), 1 mg, PRN  dextrose, 100 mL/hr, PRN  sodium chloride flush, 5-40 mL, PRN  sodium chloride, , PRN  ondansetron, 4 mg, Q8H PRN   Or  ondansetron, 4 mg, Q6H PRN  polyethylene glycol, 17 g, Daily PRN  acetaminophen, 650 mg, Q6H PRN   Or  acetaminophen, 650 mg, Q6H PRN  dextrose bolus (hypoglycemia), 125 mL, PRN   Or  dextrose bolus (hypoglycemia), 250 mL, PRN  albuterol, 2.5 mg, Q4H PRN         Objective:    /76   Pulse 130   Temp 98.4 °F (36.9 °C) (Oral)   Resp 18   Ht 5' (1.524 m)   Wt 231 lb 1 oz (104.8 kg)   SpO2 93%   BMI 45.13 kg/m²   General Appearance: alert and oriented to person, place and time and in no acute distress  Skin: warm and dry  Head: normocephalic and atraumatic  Eyes: pupils equal, round, and reactive to light, extraocular eye movements intact, conjunctivae normal  Neck: neck supple and non tender without mass   Pulmonary/Chest: clear to auscultation bilaterally- no wheezes, rales or rhonchi, normal air movement, no respiratory distress  Cardiovascular: tachy  rate, irregular rhythm, normal S1 and S2 and no carotid bruits  Abdomen: soft, non-tender, non-distended, normal bowel sounds, no masses or organomegaly  Extremities: no cyanosis, no clubbing and no edema  Neurologic: no cranial nerve deficit and speech normal      Recent Labs     04/14/22  1557      K 4.0      CO2 28   BUN 12   CREATININE 1.2*   GLUCOSE 146*   CALCIUM 8.5*       Recent Labs     04/14/22  1557   ALKPHOS 98   PROT 5.8*   LABALBU 3.7   BILITOT 0.6   AST 7   ALT <5       Recent Labs     04/14/22  1557   WBC 4.9   RBC 3.86   HGB 10.0*   HCT 34.2   MCV 88.6   MCH 25.9*   MCHC 29.2*   RDW 16.6*      MPV 9.8         Radiology:   CT Head WO Contrast   Final Result   No acute intracranial abnormality or hemorrhage. CT Cervical Spine WO Contrast   Final Result   No acute abnormality of the cervical spine. XR CHEST PORTABLE   Final Result   Cardiomegaly and mild pulmonary venous congestion. No pneumonia.            CT Head WO Contrast    Result Date: 4/14/2022  EXAMINATION: CT OF THE HEAD WITHOUT CONTRAST  4/14/2022 3:10 pm TECHNIQUE: CT of the head was performed without the administration of intravenous contrast. Dose modulation, iterative reconstruction, and/or weight based adjustment of the mA/kV was utilized to reduce the radiation dose to as low as reasonably achievable. COMPARISON: March 06/20/2022 HISTORY: ORDERING SYSTEM PROVIDED HISTORY: fall TECHNOLOGIST PROVIDED HISTORY: Reason for exam:->fall Has a \"code stroke\" or \"stroke alert\" been called? ->No Decision Support Exception - unselect if not a suspected or confirmed emergency medical condition->Emergency Medical Condition (MA) FINDINGS: BRAIN/VENTRICLES: There are mild age related cortical atrophy and periventricular white matter ischemic changes. There is no acute intracranial hemorrhage, mass effect or midline shift. No abnormal extra-axial fluid collection. The gray-white differentiation is maintained without evidence of an acute infarct. There is no evidence of hydrocephalus. ORBITS: The visualized portion of the orbits demonstrate no acute abnormality. SINUSES: The visualized paranasal sinuses and mastoid air cells demonstrate no acute abnormality. SOFT TISSUES/SKULL:  No acute abnormality of the visualized skull or soft tissues. No acute intracranial abnormality or hemorrhage. CT Cervical Spine WO Contrast    Result Date: 4/14/2022  EXAMINATION: CT OF THE CERVICAL SPINE WITHOUT CONTRAST 4/14/2022 3:10 pm TECHNIQUE: CT of the cervical spine was performed without the administration of intravenous contrast. Multiplanar reformatted images are provided for review. Dose modulation, iterative reconstruction, and/or weight based adjustment of the mA/kV was utilized to reduce the radiation dose to as low as reasonably achievable. COMPARISON: March 26, 2022 HISTORY: ORDERING SYSTEM PROVIDED HISTORY: fall TECHNOLOGIST PROVIDED HISTORY: Reason for exam:->fall Decision Support Exception - unselect if not a suspected or confirmed emergency medical condition->Emergency Medical Condition (MA) FINDINGS: BONES/ALIGNMENT: There is no acute fracture or traumatic malalignment.   There is straightening of normal lordotic curvature likely due to muscular spasm and/or positioning of the neck. DEGENERATIVE CHANGES: No significant degenerative changes. SOFT TISSUES: There is no prevertebral soft tissue swelling. No acute abnormality of the cervical spine. XR CHEST PORTABLE    Result Date: 4/14/2022  EXAMINATION: ONE XRAY VIEW OF THE CHEST PORTABLE UPRIGHT 4/14/2022 3:10 pm COMPARISON: 03/16/2022 HISTORY: ORDERING SYSTEM PROVIDED HISTORY: left rib pain TECHNOLOGIST PROVIDED HISTORY: Reason for exam:->left rib pain FINDINGS: Chest evaluation partly limited by portable technique and large body habitus. Mild cardiomegaly, unchanged. Cephalization of the pulmonary vascularity within the right upper lobe in keeping with mild pulmonary venous congestion. No focal infiltrate or pleural effusion. No pneumothorax. Cardiomegaly and mild pulmonary venous congestion. No pneumonia. Assessment:  Principal Problem:    New onset a-fib ([facility]  Active Problems:    Asthma    Hyperlipidemia    Obstructive sleep apnea syndrome    Diabetes mellitus ([facility]    Parkinson's disease ([facility]    Primary hypertension  Resolved Problems:    * No resolved hospital problems. *      Plan:  1. afib RVR, new onset: Consult cardiology-await input. TSH 1.030. Weight based lovenox BID. 2. CAD: on statin , ASA. Left heart cath Jan 2022 with 50% stenosis . 3. DM2: A1c 6.9. Blood sugars stable. Continue home Lantus, carb conrol diet, low dose insulin slide scale, hypoglycemia protocol  4. HTN:  With intermittent hypotension. Continue Imdur, amlodipine, metoprolol, losartan with holding parameters. 5. Parkinson's: resume home sinemet  6. HLD: continue  home statin. Lipid panel with elevated triglycerides. 7. SERENA: PAP  8.  Asthma: resume home meds           Electronically signed by RASHARD Hester CNP on 4/15/2022 at 8:13 AM

## 2022-04-15 NOTE — CARE COORDINATION
4/15/2022  Social Work Discharge Planning: This worker met with Pt to discuss  role and transition of care/discharge planning. Pt resides in the [address] apartments with an elevator. Pt has a rollator, quad cane, glucometer, blood pressure cuff, ERS, and gets transported to appointments and the store via the community bus. Am pac is 10/24 and SW discussed [facility]. Pt is apprehensive to go to a [facility]ER[facility];however, SW encouraged her to think about it due to living alone at home and not getting around very well. Pt agreed to think about it-SW gave Pt a [facility] list. Will need to revisit discharge plan. If Pt were to go home she wants to have an aide with [facility].  Electronically signed by ESTHER Lopez on 4/15/2022 at 2:29 PM

## 2022-04-15 NOTE — PROGRESS NOTES
Occupational Therapy  OCCUPATIONAL THERAPY INITIAL EVALUATION    [facility] [place], [place]        Date:4/15/2022                                                  Patient Name: Joao Cano    MRN: [id number]    : 1950    Room: [address] [place] [address]-A      Evaluating OT: Katy Blackman OTR/DIPAK [id number]      Referring Provider: RASHARD Nettles CNP    Specific Provider Orders/Date: OT eval and treat 22      Diagnosis: Acute renal insufficiency [N28.9]  New onset a-fib ([facility] [I48.91]  Atrial fibrillation with RVR ([address] [I48.91]  Acute on chronic congestive heart failure, unspecified heart failure type ([facility] [address] [I50.9]      Pertinent Medical History: anxiety, asthma, CAD, CA, CKD, DM, HTN, Parkinson's       Precautions:  Fall Risk, alarm     Assessment of current deficits    [x] Functional mobility  [x]ADLs  [x] Strength               []Cognition    [x] Functional transfers   [x] IADLs         [x] Safety Awareness   [x]Endurance    [x] Fine Coordination              [x] Balance      [] Vision/perception   []Sensation     []Gross Motor Coordination  [] ROM  [] Delirium                   [] Motor Control     OT PLAN OF CARE   OT POC based on physician orders, patient diagnosis and results of clinical assessment    Frequency/Duration 2-4 days/wk for 2 weeks PRN   Specific OT Treatment Interventions to include:   * Instruction/training on adapted ADL techniques and AE recommendations to increase functional independence within precautions       * Training on energy conservation strategies, correct breathing pattern and techniques to improve independence/tolerance for self-care routine  * Functional transfer/mobility training/DME recommendations for increased independence, safety, and fall prevention  * Patient/Family education to increase follow through with safety techniques and functional independence  * Recommendation of environmental Complexity: Low    Time In: 1000  Time Out: 1015    Min Units   OT Eval Low 97165  x  1   OT Eval Medium 93007      OT Eval High 46691      OT Re-Eval [id number]       Therapeutic Ex 11104       Therapeutic Activities 08467       ADL/Self Care 42787       Orthotic Management 13295       Manual 26433     Neuro Re-Ed 59407       Non-Billable Time          Evaluation Time additionally includes thorough review of current medical information, gathering information on past medical history/social history and prior level of function, interpretation of standardized testing/informal observation of tasks, assessment of data and development of plan of care and goals.             Keeley Ragland, OTR/L, [id number]

## 2022-04-15 NOTE — PROGRESS NOTES
SPEECH/LANGUAGE PATHOLOGY  VIDEOFLUOROSCOPIC STUDY OF SWALLOWING (MBS)   and PLAN OF CARE    PATIENT NAME:  Elon Claude  (female)     MRN:  [id number]    :  1950  (66 y.o.)  STATUS:  Inpatient: Room 0406/0406-A    TODAY'S DATE:  4/15/2022  04/15/22 1145   SLP video swallow Start: 04/15/22 1145, End: 04/15/22 1145, ONE TIME, Standing Count: 1 Occurrences, R    Tom Garner MD  REASON FOR REFERRAL: coughing/ throat clear with all solids during bedside    EVALUATING THERAPIST: ONEIL Blackburn      RESULTS:      DYSPHAGIA DIAGNOSIS:  mild-moderate oropharyngeal phase dysphagia     DIET RECOMMENDATIONS:  Soft and bite size consistency solids (IDDSI level 6) with  thin liquids (IDDSI level 0)    FEEDING RECOMMENDATIONS:    Assistance level:  Stand by assistance is needed during all oral intake, Encourage self-feeding     Compensatory strategies recommended: Small bites/sips, Alternate solids and liquids, Check for oral pocketing and No straw     Discussed recommendations with nursing and/or faxed report to referring provider: Nursing not available, diet change recommendation placed in Physician sticky note section     Laryngeal Penetration and Aspiration:  Penetration WITH aspiration was observed in today's study with  mildly thick liquid (nectar), with straw only    SPEECH THERAPY  PLAN OF CARE   The dysphagia POC is established based on physician order and dysphagia diagnosis    Skilled SLP intervention for dysphagia management up to 5x per week until goals met, pt plateaus in function and/or discharged from hospital      Conditions Requiring Skilled Therapeutic Intervention for dysphagia:    Reduced laryngeal closure resulting in aspiration   Swallow triggered when bolus head at level of laryngeal surface of epiglottis increasing risk of aspiration    SPECIFIC DYSPHAGIA INTERVENTIONS TO INCLUDE:     Therapeutic exercises  Trials of upgraded diet/liquid   Therapeutic intervention to facilitate implementation of energy conservation techniques during intake to decrease potential for aspiration associated with compromised swallow/breathing sequence. Specific instructions for next treatment:  initiate instruction of therapeutic exercises  and initiate instruction of compensatory strategies  Treatment Goals:    Short Term Goals:  Pt will implement identified compensatory swallowing strategies on 90% of opportunities or greater to improve airway protection and swallow function. Pt will complete PO trials of upgraded diet textures with SLP only to determine the least restrictive PO diet to maintain adequate nutrition/hydration with no more than 1 overt s/s of pen/asp. Pt will complete BOTR strength/ ROM exercises to reduce pharyngeal residuals and improve epiglottic inversion minimal verbal prompts  Pt will complete laryngeal strength/ ROM therapeutic exercises to improve airway protection for the least restrictive PO diet minimal verbal prompts  Pt will complete Effortful Swallow therapeutically to target increased oral and base of tongue pressure, increased pharyngeal constrictor contractions, and increased UES relaxation duration to reduce pharyngeal residue with minimal verbal prompts     Long Term Goals:   Pt will maintain adequate nutrition/hydration via PO intake of the least restrictive oral diet with implementation of safe swallow/ compensatory strategies and decrease signs/symptoms of aspiration to less than 1 x/day. Patient/family Goal:    Did not state. Will further assess during treatment.     Plan of care discussed with Patient   The Patient understand(s) the diagnosis, prognosis and plan of care     Rehabilitation Potential/Prognosis: good                      ADMITTING DIAGNOSIS: Acute renal insufficiency [N28.9]  New onset a-fib ([address] [I48.91]  Atrial fibrillation with RVR ([facility] [I48.91]  Acute on chronic congestive heart failure, unspecified heart failure type ([address] [I50.9]     VISIT DIAGNOSIS:   Visit Diagnoses       Codes    Acute renal insufficiency     N28.9              PATIENT REPORT/COMPLAINT: coughing with all consistencies    PRIOR LEVEL OF SWALLOW FUNCTION:    Past History of Dysphagia?:  none reported    Home diet: Regular consistency solids (IDDSI level 7) with  thin liquids (IDDSI level 0)  Current Diet Order:  ADULT DIET; Regular; 4 carb choices (60 gm/meal); Low Fat/Low Chol/High Fiber/SANDI; Low Sodium (2 gm)    PROCEDURE:  Consistencies Administered During the Evaluation   Liquids: thin liquid and nectar thick liquid   Solids:  pureed foods and solid foods      Method of Intake:   cup, straw, spoon  Self fed, Fed by clinician      Position:   Seated, upright    INSTRUMENTAL ASSESSMENT:    ORAL PREP/ ORAL PHASE:    Decreased mastication due to:  decreased lingual control and disorganized chewing pattern  Decreased bolus formation resulting in observed premature pharyngeal spillage     PHARYNGEAL PHASE:     ONSET TIME       Delayed initiation of the pharyngeal swallow was noted with swallow reflex triggered at the level of the vallecula        PHARYNGEAL RESIDUALS        Vallecula/Pharyngeal Wall           No significant residuals were noted in the vallecula      Pyriform Sinuses      No significant residuals were noted in the pyriform sinuses     LARYNGEAL PENETRATION   Laryngeal penetration occurred prior to aspiration. Further details under aspiration section. ASPIRATION  Aspiration occurred DURING the swallow for thin liquid due to  delayed laryngeal closure and inadequate laryngeal closure . Aspiration was moderate-severe and occurred only with use of a straw . a spontaneous cough was noted but was not effective.      PENETRATION-ASPIRATION SCALE (PAS):  THIN 7 = Material enters the airway, passes below the vocal folds, and is not ejected from the trachea despite effort (with straw only)  MILDLY THICK 1 = Material does not enter the airway  MODERATELY THICK item not administered  PUREE 1 = Material does not enter the airway  HARD SOLID 1 = Material does not enter the airway       COMPENSATORY STRATEGIES    Compensatory strategies that were beneficial included Small bites/sips, Alternate solids and liquids and No straw      STRUCTURAL/FUNCTIONAL ANOMALIES   No structural/functional anomalies were noted    CERVICAL ESOPHAGEAL STAGE :     The cervical esophagus appeared adequate          ___________    Cognition:   Intermittent confusion noted    Oral Peripheral Examination   Adequate lingual/labial strength     Current Respiratory Status   room air     Parameters of Speech Production  Respiration:  Adequate for speech production  Quality:   Harsh and Strained  Intensity: Quiet    Pain: No pain reported. EDUCATION:   The Speech Language Pathologist (SLP) completed education regarding results of evaluation and that intervention is warranted at this time. Learner: Patient  Education: Reviewed results and recommendations of this evaluation, Reviewed diet and strategies, Reviewed signs, symptoms and risks of aspiration, Reviewed recommendations for follow-up and Education Related to Potential Risks and Complications Due to Impairment/Illness/Injury  Evaluation of Education:  Needs further instruction    This plan may be re-evaluated and revised as warranted. Evaluation Time includes thorough review of current medical information, gathering information on past medical history/social history and prior level of function, completion of standardized testing/informal observation of tasks, assessment of data and education on plan of care and goals. [x]The admitting diagnosis and active problem list, have been reviewed prior to initiation of this evaluation.     CPT Code: 79054  dysphagia study    INTERVENTION  CPT Code: 94037  dysphagia tx    Speech Pathologist (SLP) completed education with the patient/family regarding procedure of Modified Barium Swallow Study prior to exam and then type of swallowing impairment following completion of MBSS. Reviewed current solid/liquid consistency diet recommendations --   Dysphagia 3, Soft/advanced (Soft & Bite-sized) solids with  thin liquids (IDDSI level 0) and discussed compensatory strategies (NO STRAWS, small bites/ sips) to ensure safe PO intake. Images from MBSS reviewed with patient/ family and education provided. Reviewed aspiration precautions. Encouraged patient and/or family to engage SLP in unstructured Q&A session relative to identified deficit areas; indicated understanding of all information provided via satisfactory verbal response.             ACTIVE PROBLEM LIST:   Patient Active Problem List   Diagnosis    Depression with anxiety    Osteoporosis    Asthma    Hyperlipidemia    Mitral regurgitation    Obstructive sleep apnea syndrome    Psoriasis    Diabetes mellitus ([address]    Parkinson's disease ([address]    Primary hypertension    Microalbuminuria    Morbid obesity (HCC)    RLS (restless legs syndrome)    Generalized weakness    Inability to walk    Hypothyroidism    Chest pain    Acute asthma exacerbation    Asthma exacerbation, mild    New onset a-fib (HCC)    Atrial fibrillation with RVR (HCC)    Acute on chronic congestive heart failure ([address]    Coronary artery disease involving native coronary artery of native heart without angina pectoris       Gaston Nunez., NORAH-SLP  Speech-Language Pathologist  [id number]  4/15/2022

## 2022-04-15 NOTE — CONSULTS
Inpatient Cardiology Consultation      Reason for Consult:  Atrial fibrillation     Consulting Physician: Dr. Clari Bravo     Requesting Physician:  RASHARD Manning     Date of Consultation: 4/15/2022    History of Present Illness:   Ms. Kristin Mcqueen is a 66year old lady who is followed at our practice by Dr. Zain Pompa; she was last seen in hospital consultation in 2022 after presenting with chest pain and abnormal stress test. Subsequent catheterization showed moderate coronary disease and she was discharged Imdur and Toprol XL. She has not had outpatient cardiology follow up. She denies having had palpitations or chest discomfort, but has been short of breath with mild exertion (less than ten feet); she is unsure of how long she has been dyspneic. She does complain of two pillow orthopnea and does not use Cpap (states she no longer has it). She presented to the ER on  after becoming dizzy and then sustaining a mechanical fall, striking her head. On arrival, she  was found to be in atrial fibrillation, with CXR showing evidence of pulmonary vascular edema. She was treated with Lasix, Lopressor, and Tylenol. Of note, a close friend had  of cancer and she has been distraught over this. Currently, she is sitting up in bed, eating breakfast and is tearful much of the time, and having difficulty eating and swallowing due to this. She has difficulty focusing on the interview due to her emotional distress. She does report improvement in her dyspnea since admission and states she had brisk urination after receiving Lasix in the ER. Past Medical History:    1. 08/10/11 CosmEthicsiscan MPS [facility]):  Normal Rosalea Dulce portion.  No evidence for inducible ischemia.  No previous myocardial infarction.  Ejection fraction 77%. 2. Lexiscan MPS 2014: Reversible inferolateral wall perfusion defect. Finding suggests left ventricular myocardium at risk for stress-induced ischemia. EF >70%. 3. TTE 04/27/2014 (Dr. Delma Clark): Normal left ventricle size and systolic function. No wall motion abnormalities. Mild concentric left ventricular hypertrophy. Ejection fraction is visually estimated at >55%. Normal right ventricular size and function. Aortic root is sclerotic and calcified  4. Cardiac Catheterization (Dr. Delma Clark) 04/28/2014: Left main: Dafne Charlotte left main artery is a short vessel which did not appear to have any significant angiographic disease. Left anterior descending:  The left anterior descending artery is a moderate-sized vessel which has minor luminal irregularities in its middle segment but without any significant angiographic luminal narrowing.  The diagonal branches also did not appear to have any significant disease. Left circumflex:  The left circumflex is a large, codominant vessel which did not appear to have any significant angiographic disease. Right coronary artery:  The right coronary artery is a moderate-sized codominant vessel which did not appear to have any significant angiographic disease. LEFT VENTRICULOGRAPHY:  The left ventricle is normal in size and contractility with an estimated ejection fraction of 60% to 65%.  There was no mitral regurgitation. RIGHT FEMORAL ARTERY ANGIOGRAPHY:  The right common femoral artery and the proximal segments of the right superficial femoral artery and the right profunda did not appear to have any significant disease. 5. TTE 02/07/2021 (Dr. Nurys Arredondo): Normal left ventricle size and systolic function. Ejection fraction is visually estimated at 65-70%. No regional wall motion abnormalities seen. Moderate concentric left ventricular hypertrophy. Normal right ventricular size and function. No systolic mitral regurgitation noted. No hemodynamically significant aortic stenosis is present. Unable to estimate PA systolic pressure. No evidence for hemodynamically significant pericardial effusion.  Valves were not well visualized, please consider PAT if clinical suspicion for endocarditis is high  6. Lexiscan MPS 01/10/2022: ECG during the infusion did not change. The myocardial perfusion imaging was abnormal. The abnormality was a large sized, moderate fixed defect involving the inferior and inferolateral wall suggestive prior infarct without any reversibility. There was also a small sized mild perfusion defect involving the mid to distal anterior wall suggestive ischemia. Overall left ventricular systolic function was normal without regional wall motion abnormalities. No transient ischemic dilatation. High risk general pharmacologic stress test.  7. Cardiac catheterization 1/11/2022: CONCLUSIONS:  Coronary artery disease: Left main. No significant disease. LAD. Heavily calcified proximal and mid vessel with 50% mid vessel stenosis. 60% proximal stenosis of a moderate size first diagonal branch. LCX. 50% ostial narrowing of the AV groove branch in the mid vessel which is a bifurcation lesion with a large marginal branch which itself did not appear to have any significant disease. The AV groove branch of the left circumflex continues to provide a posterior descending artery branch and the posterolateral branch (codominant vessel). RCA. Codominant vessel with no significant angiographic disease. Normal left ventricular size with hyperdynamic left ventricular systolic function with an estimated ejection fraction of 75%. Systemic hypertension. Elevated left ventricular end-diastolic pressure (18)  8. Echocardiogram 1/11/2022:  Left ventricle is normal in size. Moderate concentric left ventricular hypertrophy. No regional wall motion abnormalities seen. Ejection fraction is visually estimated at 70+/-5%. There is doppler evidence of stage I diastolic dysfunction. Mildly dilated right ventricle. Right ventricle global systolic function is normal ( TAPSE = 1.8 ). Normal size atria. No significant valvular abnormalities noted. 9. Morbid obesity.    10.  Diabetes mellitus.    11. Hyperlipidemia. 12. Hypertension.    13. Asthma.    14. SERENA with noncompliance with Cpap  15. Stomach ulcers.  10/29/13, status post EGD and total colonoscopy (Dr. Brionna Chan):  Normal esophagus, normal stomach, normal duodenum.  Colonoscopy was completel normal to the ileocecal valve  16. Anxiety   17. Status post bilateral breast reduction surgery.    18. Right breast cancer, status post right lumpectomy with radiation therapy. 19. Status post tonsillectomy and adenoidectomy.    20. Status post tubal ligation. 21. Status post right tube and ovary surgical removal.  22. Status post cholecystectomy.    23. Status post surgical removal of adhesions. 24. Status post excision of sebaceous cyst from abdomen.    25. Status post  release of hammertoes bilaterally. 26. Status post lumbar laminectomy for herniated L4-L5 disk. 27. 08/03/13, bone biopsy:  Ulcerations with retrograde joint contracture of the proximal interphalangeal joint, 2nd digit of right and 3rd digit of left.  Status post deep soft tissue cultures with known biopsies and correction of deformity of the 2nd digit of the right and 3rd digit of left (Dr. Chary Abrams).     28. Iron-deficiency anemia. 34. Admitted in March 2022 with asthma exacerbation     Social History:   Denies tobacco, alcohol, illicit drug use, and routine caffeine intake. Medications Prior to Admit:  Prior to Admission medications    Medication Sig Start Date End Date Taking?  Authorizing Provider   budesonide-formoterol (SYMBICORT) 160-4.5 MCG/ACT AERO Inhale 2 puffs into the lungs 2 times daily 3/19/22   Dinesh N Gareth, DO   ipratropium-albuterol (DUONEB) 0.5-2.5 (3) MG/3ML SOLN nebulizer solution Inhale 3 mLs into the lungs every 4 hours as needed for Shortness of Breath 3/19/22   Shital Lugo, DO   albuterol sulfate HFA (PROVENTIL HFA) 108 (90 Base) MCG/ACT inhaler Inhale 2 puffs into the lungs every 6 hours as needed for Wheezing 3/19/22   [facility] NELY Lugo,    rOPINIRole (REQUIP) 1 MG tablet Take 1 tablet by mouth 3 times daily 2/8/22   RASHARD De Santiago CNP   carbidopa-levodopa (SINEMET CR)  MG per extended release tablet Take 1 tablet by mouth 3 times daily 1/26/22   RASHARD De Santiago CNP   isosorbide mononitrate (IMDUR) 30 MG extended release tablet Take 1 tablet by mouth daily  Patient not taking: Reported on 4/14/2022 1/11/22   Jennifer Trevino MD   nitroGLYCERIN (NITROSTAT) 0.4 MG SL tablet up to max of 3 total doses. If no relief after 1 dose, call 911. Patient not taking: Reported on 3/17/2022 1/11/22   Jennifer Trevino MD   busPIRone (BUSPAR) 5 MG tablet Take 1 tablet by mouth 3 times daily  Patient not taking: Reported on 4/14/2022 1/11/22   Jennifer Trevino MD   insulin glargine (LANTUS) 100 UNIT/ML injection vial Inject 10 Units into the skin nightly 1/11/22   Neftaly Salamanca MD   metoprolol succinate (TOPROL XL) 25 MG extended release tablet Take 1 tablet by mouth daily 1/12/22   Jennifer Trevino MD   amLODIPine (NORVASC) 10 MG tablet Take 1 tablet by mouth daily  Patient not taking: Reported on 4/14/2022 1/12/22   Jennifer Trevino MD   montelukast (SINGULAIR) 10 MG tablet Take 10 mg by mouth nightly    Historical Provider, MD   ALPRAZolam Danney Efraín) 0.5 MG tablet Take 0.5 mg by mouth daily as needed for Anxiety.     Historical Provider, MD   meclizine (ANTIVERT) 25 MG tablet Take 25 mg by mouth 3 times daily as needed for Dizziness    Historical Provider, MD   Multiple Vitamin (MULTIVITAMIN) TABS tablet Take 1 tablet by mouth daily  Patient not taking: Reported on 3/17/2022 10/26/21   RASHARD Owens CNP   Cholecalciferol (VITAMIN D) 25 MCG TABS Take 1 tablet by mouth daily  Patient not taking: Reported on 4/14/2022 10/26/21   RASHARD Owens CNP   doxepin (SINEQUAN) 10 MG capsule Take 10 mg by mouth nightly    Historical Provider, MD   venlafaxine (EFFEXOR XR) 150 MG extended release capsule Take 150 mg by mouth daily    Historical Provider, MD   losartan (COZAAR) 50 MG tablet Take 50 mg by mouth daily    Historical Provider, MD   omeprazole (PRILOSEC) 40 MG delayed release capsule Take 40 mg by mouth daily     Historical Provider, MD   blood glucose test strips (ACCU-CHEK RIGOBERTO PLUS) strip 1 each by In Vitro route 2 times daily Indications: DISP ACCU-CHEK As needed. Historical Provider, MD   lamoTRIgine (LAMICTAL) 200 MG tablet Take 200 mg by mouth daily  10/18/19   Historical Provider, MD   atorvastatin (LIPITOR) 20 MG tablet Take 1 tablet by mouth daily 10/21/19   Rachelle Abebe MD   insulin aspart (NOVOLOG FLEXPEN) 100 UNIT/ML injection pen INJECT 0-10 UNITS INTO THE SKIN THREE TIMES DAILY(BEFORE MEALS) SLIDING SCALE (MAX DAILY 30 UNITS)  Patient taking differently: Indications: med.  approved 6/1/19-7/29/20 INJECT 0-10 UNITS INTO THE SKIN THREE TIMES DAILY(BEFORE MEALS) SLIDING SCALE (MAX DAILY 30 UNITS) 7/29/19   Rachelle Abebe MD   aspirin 81 MG tablet Take 81 mg by mouth nightly    Historical Provider, MD       Current Medications:    Current Facility-Administered Medications: ALPRAZolam (XANAX) tablet 0.5 mg, 0.5 mg, Oral, Daily PRN  amLODIPine (NORVASC) tablet 10 mg, 10 mg, Oral, Daily  aspirin EC tablet 81 mg, 81 mg, Oral, Nightly  atorvastatin (LIPITOR) tablet 20 mg, 20 mg, Oral, Daily  busPIRone (BUSPAR) tablet 5 mg, 5 mg, Oral, TID  insulin lispro (HUMALOG) injection vial 0-6 Units, 0-6 Units, SubCUTAneous, TID WC  insulin lispro (HUMALOG) injection vial 0-3 Units, 0-3 Units, SubCUTAneous, Nightly  glucose (GLUTOSE) 40 % oral gel 15 g, 15 g, Oral, PRN  glucagon (rDNA) injection 1 mg, 1 mg, IntraMUSCular, PRN  dextrose 5 % solution, 100 mL/hr, IntraVENous, PRN  Vitamin D (CHOLECALCIFEROL) tablet 1,000 Units, 1,000 Units, Oral, Daily  doxepin (SINEQUAN) capsule 10 mg, 10 mg, Oral, Nightly  insulin glargine (LANTUS) injection vial 10 Units, 10 Units, SubCUTAneous, Nightly  isosorbide mononitrate (IMDUR) extended release tablet 30 mg, 30 mg, Oral, Daily  lamoTRIgine (LAMICTAL) tablet 200 mg, 200 mg, Oral, Daily  losartan (COZAAR) tablet 50 mg, 50 mg, Oral, Daily  metoprolol succinate (TOPROL XL) extended release tablet 25 mg, 25 mg, Oral, Daily  montelukast (SINGULAIR) tablet 10 mg, 10 mg, Oral, Nightly  pantoprazole (PROTONIX) tablet 40 mg, 40 mg, Oral, QAM AC  rOPINIRole (REQUIP) tablet 1 mg, 1 mg, Oral, TID  venlafaxine (EFFEXOR XR) extended release capsule 150 mg, 150 mg, Oral, Daily  sodium chloride flush 0.9 % injection 5-40 mL, 5-40 mL, IntraVENous, 2 times per day  sodium chloride flush 0.9 % injection 5-40 mL, 5-40 mL, IntraVENous, PRN  0.9 % sodium chloride infusion, , IntraVENous, PRN  ondansetron (ZOFRAN-ODT) disintegrating tablet 4 mg, 4 mg, Oral, Q8H PRN **OR** ondansetron (ZOFRAN) injection 4 mg, 4 mg, IntraVENous, Q6H PRN  polyethylene glycol (GLYCOLAX) packet 17 g, 17 g, Oral, Daily PRN  acetaminophen (TYLENOL) tablet 650 mg, 650 mg, Oral, Q6H PRN **OR** acetaminophen (TYLENOL) suppository 650 mg, 650 mg, Rectal, Q6H PRN  enoxaparin (LOVENOX) injection 100 mg, 1 mg/kg, SubCUTAneous, BID  dextrose bolus (hypoglycemia) 10% 125 mL, 125 mL, IntraVENous, PRN **OR** dextrose bolus (hypoglycemia) 10% 250 mL, 250 mL, IntraVENous, PRN  albuterol (PROVENTIL) nebulizer solution 2.5 mg, 2.5 mg, Nebulization, Q4H PRN  Arformoterol Tartrate (BROVANA) nebulizer solution 15 mcg, 15 mcg, Nebulization, BID **AND** budesonide (PULMICORT) nebulizer suspension 500 mcg, 0.5 mg, Nebulization, BID **AND** Nebulizer tx intermittent, , , BID  carbidopa-levodopa (SINEMET CR)  MG per extended release tablet 2 tablet, 2 tablet, Oral, TID    Allergies:  Ciprofloxacin and Codeine    Review of Systems:   · Constitutional: Denies fatigue, fevers, chills or night sweats  · ENT: Denies headaches, bleeding gums, or epistaxis   · Cardiovascular: Denies chest pain or palpitations.  No lower extremity swelling. · Respiratory: Dyspnea or orthopnea as above. Denies cough  · Gastrointestinal: Denies nausea, vomiting, diarrhea, abdominal pain or dark/bloody stools. · Genitourinary: Denies urgency, dysuria or hematuria. · Musculoskeletal: Fall as above. Denies myalgias or arthralgias. · Integumentary: Denies rash or ulcerations   · Neurological: Denies syncope, numbness or tingling. Denies as above. · Psychiatric: Denies anxiety or depression. · Endocrine: Denies temperature intolerance or excessive thirst.   · Hematologic/Lymphatic: Denies abnormal bruising or bleeding. Physical Exam:   BP 94/61   Pulse 116   Temp 97.9 °F (36.6 °C) (Oral)   Resp 20   Ht 5' (1.524 m)   Wt 231 lb 1 oz (104.8 kg)   SpO2 92%   BMI 45.13 kg/m²   CONST:  Well developed, obese lady who appears of stated age. Awake, alert and cooperative. No apparent distress. HEENT:   Head- Normocephalic, atraumatic   Throat- Oral mucosa pink and moist  Neck-  No stridor or carotid bruit. No obvious JVD but thick neck. CHEST: Chest symmetrical and non-tender to palpation. Respirations unlabored. RESPIRATORY:  Breath sounds clear throughout   CARDIOVASCULAR:     Heart Ausculation- Irregular and tachycardic, no murmur, s3, s4 or rub   PV: No lower extremity edema. No varicosities. Pedal pulses palpable  ABDOMEN: Soft, non-tender to light palpation. Bowel sounds present. No palpable masses  MS: Good muscle strength and tone. No atrophy or abnormal movements. : Deferred  SKIN: Warm and dry  NEURO / PSYCH: Oriented to person, place and time. Speech clear and appropriate. Follows all commands.  Pleasant affect     Telemetry: AFib with rates 110s to 130s (increased with anxiety and coughing)   ECG: AF, with RVR with low QRS voltage and nonspecific ST abnormality     Intake/Output Summary (Last 24 hours) at 4/15/2022 0727  Last data filed at 4/14/2022 2246  Gross per 24 hour   Intake 10 ml   Output --   Net 10 ml       Labs: CBC:   Recent Labs     04/14/22  1557   WBC 4.9   HGB 10.0*   HCT 34.2        BMP:   Recent Labs     04/14/22  1557      K 4.0   CO2 28   BUN 12   CREATININE 1.2*   LABGLOM 44   CALCIUM 8.5*     TSH:   Recent Labs     04/14/22  1557   TSH 1.030     HgA1c:   Lab Results   Component Value Date    LABA1C 6.9 (H) 03/16/2022     PT/INR:   Recent Labs     04/15/22  0325   PROTIME 13.9*   INR 1.3     FASTING LIPID PANEL:  Lab Results   Component Value Date    CHOL 97 04/15/2022    HDL 46 04/15/2022    LDLCALC 17 04/15/2022    TRIG 171 04/15/2022     LIVER PROFILE:  Recent Labs     04/14/22  1557   AST 7   ALT <5   LABALBU 3.7     Component      Latest Ref Rng & Units 4/14/2022 4/14/2022 3/17/2022 3/16/2022           5:19 PM  3:57 PM  4:02 PM  9:04 PM   Troponin, High Sensitivity      0 - 9 ng/L 20 (H) 21 (H) 14 (H) 20 (H)     Component      Latest Ref Rng & Units 3/16/2022 1/9/2022 1/9/2022           7:48 PM 10:52 PM  1:17 PM   Troponin, High Sensitivity      0 - 9 ng/L 18 (H) 20 (H) 18 (H)       CXR 4/14/2022:  FINDINGS:   Chest evaluation partly limited by portable technique and large body habitus. Mild cardiomegaly, unchanged. Cephalization of the pulmonary vascularity within the right upper lobe in keeping with mild pulmonary venous congestion. No focal infiltrate or pleural effusion.  No pneumothorax. Impression:    Cardiomegaly and mild pulmonary venous congestion.  No pneumonia. Assessment:  1. Atrial fibrillation  · Newly diagnosed, duration unknown  · [id number] score of 5  · Normal left atrium on echo 1/2022  · Normal TSH     2. Dizziness: ? Related to #1 vs anxiety/vagal response due to death of friend    3. Moderate CAD on cath in 1/2022    4. Mildly elevated hs-Valerie: pattern not suggestive of ACS    5. Acute HFpEF on presentation, in setting of #1. Now appears euvolemic post diuresis     6. History of hypertension with borderline blood pressure this admission     7.  Morbid Obesity    8. SERENA: untreated    9. Acute renal insufficiency    10. Anxiety     Recommendations:    1. Increase Toprol XL to 25 mg twice daily and titrate as tolerated for rate control     2. Stop Cozaar due to renal insufficiency and to allow for titration of beta blocker     3. Stop Aspirin     4. Begin Eliquis 5 mg twice daily: rationale discussed with her    5. If she does not convert spontaneously, will consider outpatient cardioversion after she has been on [facility] for at least four weeks. 6. Aggressive risk factor modification: she was counseled on the need for sleep apnea treatment, strict blood pressure and diabetes control. However, she is severely anxious and will need reinforcement going forward. 7. The above was discussed with patient's friend, Thad Pereira,  at her request.     The above assessment and treatment plan was made in collaboration with Dr. Escobar Schmidt. Electronically signed by RASHARD Rehman on 4/15/2022 at 7:26 AM   ______________________________________________________________________  Cardiology attending attestation:  I have independently interviewed and examined the patient. I personally reviewed pertinent laboratory values and diagnostic testing (including, if applicable, chest xray, electrocardiogram, telemetry, echocardiogram, stress testing, and coronary angiography). I have reviewed the above documentation completed by CARLTON Medrano including past medical, social, and family history and agree with the findings, assessment and plan except where noted. Plan formulated by me. I participated in all aspects of the medical decision making. Please see my additional contributions to the HPI, physical exam, and assessment / medical decision making:  _______________________________________________________________________    76-year-old female who follows with Dr. Celine Khanna. Has chronic dyspnea. Has moderate CAD by cath 1/2022.   Became dizzy and fell, came to the ER and found to be in new onset A. fib RVR. Denies palpitations. Currently A. fib low 100s. Has untreated sleep apnea. Physical Exam:  /77   Pulse 114   Temp 98.4 °F (36.9 °C) (Oral)   Resp 18   Ht 5' (1.524 m)   Wt 231 lb 1 oz (104.8 kg)   SpO2 93%   BMI 45.13 kg/m²   General appearance: Morbidly obese, awake, alert, no acute respiratory distress  Skin: Intact, no rash  ENMT: Moist mucous membranes  Neck: Supple, no carotid bruits. Normal jugular venous pressure  Lungs: Diminished  Cardiac: Irregular rhythm with a borderline tachycardic rate, normal S1&S2, no murmurs apparent  Abdomen: Soft, positive bowel sounds, nontender  Extremities: Moves all extremities x 4, trace to 1+ lower extremity edema  Neurologic: No focal motor deficits apparent, normal mood and affect    Telemetry: Not currently working  EKG:  Atrial fibrillation 130 beats minute. Normal axis and intervals. Nonspecific ST changes with low voltage    Impression:   1. New onset atrial fibrillation, unknown duration. [id number] 5  2. Mild acute on chronic HFpEF  3. Moderate CAD  4. Morbid obesity  5. Untreated sleep apnea    Recommendations:     Uptitrate beta-blocker for rate control   If unable to optimize rates, or if she remains in atrial fibrillation at the time of outpatient follow-up, could consider restoration of sinus rhythm after adequate anticoagulation or if facilitated with PAT   Hold losartan   Give 1 dose of IV furosemide   Recommend anticoagulation given elevated stroke risk, start apixaban and continue aspirin   Unlikely to remain in sinus rhythm without treatment of sleep apnea and weight loss   Aggressive risk factor modification   Outpatient follow-up with Dr. Duane Wheeler    Thank you for the consultation. Please do not hesitate to call with questions.     Niyah Lane MD, [address] [facility]

## 2022-04-15 NOTE — PROGRESS NOTES
Message sent to [address] [place] [address] regarding patient's HR sustaining 120s-130s despite being given 125mcg digoxin, relayed current BP/HR.

## 2022-04-15 NOTE — PROGRESS NOTES
Physical Therapy    Facility/Department: [address] INTERNAL MEDICINE 2  Initial Assessment    NAME: Annalise Ray  : 1950  MRN: [id number]    Date of Service: 4/15/2022      Patient Diagnosis(es): The primary encounter diagnosis was New onset a-fib [facility]). Diagnoses of Acute on chronic congestive heart failure, unspecified heart failure type ([address] and Acute renal insufficiency were also pertinent to this visit. has a past medical history of Acute on chronic congestive heart failure ([address] Anxiety, Asthma, CAD (coronary artery disease), Cancer ([address] Chronic kidney disease, Depression, Diabetes mellitus ([address] H/O mammogram, Hx MRSA infection, Hyperlipidemia, Hypertension, Lateral epicondylitis, SERENA on CPAP, Parkinson's disease ([facility] and Tubal ligation status. has a past surgical history that includes Gallbladder surgery; Toe amputation; Cholecystectomy; Colonoscopy (7/29/15); Endoscopy, colon, diagnostic (7/19/15); Upper gastrointestinal endoscopy; lumbar laminectomy; Tonsillectomy; Breast lumpectomy; Breast reduction surgery; Cardiac catheterization (2014); and Cardiac catheterization (2022). Evaluating Therapist: Dedra Yin PT      Room #:  9972/5971-C  Diagnosis:  Acute renal insufficiency [N28.9]  New onset a-fib ([facility] [I48.91]  Atrial fibrillation with RVR ([facility] [I48.91]  Acute on chronic congestive heart failure, unspecified heart failure type ([address] [I50.9]  PMHx/PSHx:  CHF, DM, Parkinson's  Precautions:  Falls, alarm      Social:  Pt lives with alone in a third floor apartment with elevator access. Prior to admission ambulates with rollator     Initial Evaluation  Date: 4/15/22 Treatment      Short Term/ Long Term   Goals   Was pt agreeable to Eval/treatment? yes     Does pt have pain?        Bed Mobility  Rolling: mod assist  Supine to sit: mod assist  Sit to supine: min assist  Scooting: mod assist  Min assist   Transfers Sit to stand: mod assist  Stand to sit: mod assist  Stand pivot: mod assist  Min assist   Ambulation    NT secondary to legs buckling in standing. 25 feet with ww with min assist   Stair Negotiation  Ascended and descended  NT   N/A   LE strength     3+/5    4/5   balance      poor     AM-PAC Raw score               10/24         Pt is alert and Oriented  Some difficulty reporting home setup and prior level of funtion  LE ROM: WFL  Sensation: intact  Endurance: fair-     ASSESSMENT:    Pt displays functional ability as noted in the objective portion of this evaluation. Patient education  Pt educated on PT objectives    Patient response to education:   Pt verbalized understanding Pt demonstrated skill Pt requires further education in this area   yes           Comments:  Pt's legs buckle in standing. Mod assist to transfer bed to bedside commode and back. High risk for falls      Conditions Requiring Skilled Therapeutic Intervention:    [x]Decreased strength     []Decreased ROM  [x]Decreased functional mobility  [x]Decreased balance   [x]Decreased endurance   []Decreased posture  []Decreased sensation  []Decreased coordination   []Decreased vision  []Decreased safety awareness   []Increased pain       Patient and or family understand(s) diagnosis, prognosis, and plan of care.     Prognosis is good for reaching above PT goals    PHYSICAL THERAPY PLAN OF CARE:    PT POC is established based on physician order and patient diagnosis     Referring provider/PT Order: RASHARD Nieves CNP/ PT eval and treat      Current Treatment Recommendations:     [x] Strengthening to improve independence with functional mobility   [] ROM to improve independence with functional mobility   [x] Balance Training to improve static/dynamic balance and to reduce fall risk  [x] Endurance Training to improve activity tolerance during functional mobility   [x] Transfer Training to improve safety and independence with all functional transfers   [x] Gait Training to improve gait mechanics, endurance and assess need for appropriate assistive device  [] Stair Training in preparation for safe discharge home and/or into the community   [] Positioning to prevent skin breakdown and contractures  [x] Safety and Education Training   [x] Patient/Caregiver Education   [] HEP  [] Other     PT long term treatment goals are located in above grid    Frequency of treatments: 2-5x/week x 5 days. Time in  1000  Time out  1015      Evaluation Time includes thorough review of current medical information, gathering information on past medical history/social history and prior level of function, completion of standardized testing/informal observation of tasks, assessment of data and education on plan of care and goals.       CPT codes:  [x] Low Complexity PT evaluation 40669  [] Moderate Complexity PT evaluation 08580  [] High Complexity PT evaluation 22987  [] PT Re-evaluation 96001  [] Gait training 39065 minutes  [] Manual therapy 09179 minutes  [] Therapeutic activities 11270 minutes  [] Therapeutic exercises 18990 minutes  [] Neuromuscular reeducation 08678 minutes     Sanna William PT [id number]

## 2022-04-15 NOTE — PROGRESS NOTES
Message sent to Dr. Renee Tavarez regarding patient's HR sustaining AFib RVR 120s-130s. New orders obtained.        Dinora Gomez RN

## 2022-04-15 NOTE — PROGRESS NOTES
SPEECH/LANGUAGE PATHOLOGY  CLINICAL ASSESSMENT OF SWALLOWING FUNCTION   and PLAN OF CARE    PATIENT NAME:  Ruperto Torres  (female)     MRN:  [id number]    :  1950  (66 y.o.)  STATUS:  Inpatient: Room 0406/0406-A    TODAY'S DATE:  4/15/2022  04/15/22 1100   SLP swallowing-dysphagia evaluation and treatment Start: 04/15/22 1100, End: 04/15/22 1100, ONE TIME, Standing Count: 1 Occurrences, Marta Montelongo MD  REASON FOR REFERRAL: Assessment of oropharyngeal swallow function; patient states she has been having difficulty with swallowing for one year   EVALUATING THERAPIST: Robynn Phalen                 RESULTS:    DYSPHAGIA DIAGNOSIS:   Clinical indicators of moderate oropharyngeal phase dysphagia        A Video Swallow Study (MBSS) is recommended and requires a physician order       DIET RECOMMENDATIONS:  cont current diet until MBSS is completed this afternoon. Patient did report that swallowing difficulty and voice changes have been occurring for one year. Pt did have baseline cough that was wet. FEEDING RECOMMENDATIONS:     Assistance level:  Not applicable      Compensatory strategies recommended: Not applicable      Discussed recommendations with nursing and/or faxed report to referring provider: Yes    SPEECH THERAPY  PLAN OF CARE   The dysphagia POC is established based on physician order, dysphagia diagnosis and results of clinical assessment     Will establish POC once MBSS is completed.     Conditions Requiring Skilled Therapeutic Intervention for dysphagia:    Oral motor strength/coordination impairment  Throat clearing during PO intake   Wet vocal quality during PO intake  Coughing during PO intake      Specific dysphagia interventions to include:     ongoing skilled PO analysis to determine if PO diet can be initiated     Specific instructions for next treatment:  MBSS to be completed  Patient Treatment Goals:    Short Term Goals:  Pt will participate in ongoing evaluation of swallow function to determine when PO diet can be safely initiated  Pt will participate in MBSS to fully assess oropharyngeal swallow function and to assist in determining the least restrictive PO diet to maintain adequate nutrition/hydration     Long Term Goals:   Pt will maintain adequate nutrition/hydration via PO intake of the least restrictive oral diet with implementation of safe swallow/ compensatory strategies and decrease signs/symptoms of aspiration to less than 1 x/day. A Video Swallow Study (MBSS) is recommended and requires a physician order      Patient/family Goal:    Did not state. Will further assess during treatment. Plan of care discussed with Patient   The Patient understand(s) the diagnosis, prognosis and plan of care     Rehabilitation Potential/Prognosis: fair                    ADMITTING DIAGNOSIS: Acute renal insufficiency [N28.9]  New onset a-fib ([facility] [I48.91]  Atrial fibrillation with RVR ([facility] [I48.91]  Acute on chronic congestive heart failure, unspecified heart failure type ([facility] [I50.9]    VISIT DIAGNOSIS:   Visit Diagnoses         Codes    Acute renal insufficiency     N28.9             PATIENT REPORT/COMPLAINT: coughing with all consistencies  RN cleared patient for participation in assessment     yes     PRIOR LEVEL OF SWALLOW FUNCTION:    PAST HISTORY OF DYSPHAGIA?: none reported    Home diet: Regular consistency solids (IDDSI level 7) with  thin liquids (IDDSI level 0)  Current Diet Order:  ADULT DIET; Regular; 4 carb choices (60 gm/meal);  Low Fat/Low Chol/High Fiber/SANDI; Low Sodium (2 gm)    PROCEDURE:  Consistencies Administered During the Evaluation   Liquids: thin liquid   Solids:  pureed foods and soft solid foods      Method of Intake:   cup, spoon  Self fed      Position:   Seated, upright    CLINICAL ASSESSMENT:  Oral Stage:       Impaired oral initiation  Decreased mastication due to:  cognitive function and disorganized chewing pattern  Oral residuals were noted : left buccal cavity      Pharyngeal Stage:    Throat clearing present after presentation of all consistencies administered  Immediate wet cough was noted after presentation of all consistencies administered  Latent wet cough was noted after presentation of all consistencies administered  Wet/gurgly vocal quality was noted after presentation of all consistencies administered    Cognition:   Confusion noted    Oral Peripheral Examination   Generalized oral weakness    Current Respiratory Status    room air     Parameters of Speech Production  Respiration:  Adequate for speech production  Quality:   Hoarse, Wet vocal quality   Intensity: Quiet    Volitional Swallow: present     Volitional Cough:   present     Pain: No pain reported. EDUCATION:   The Speech Language Pathologist (SLP) completed education regarding results of evaluation and that intervention is warranted at this time. Learner: Patient  Education: Reviewed results and recommendations of this evaluation, Reviewed diet and strategies and Reviewed signs, symptoms and risks of aspiration  Evaluation of Education:  Verbalizes understanding    This plan may be re-evaluated and revised as warranted. Evaluation Time includes thorough review of current medical information, gathering information on past medical history/social history and prior level of function, completion of standardized testing/informal observation of tasks, assessment of data and education on plan of care and goals. [x]The admitting diagnosis and active problem list, have been reviewed prior to initiation of this evaluation.         ACTIVE PROBLEM LIST:   Patient Active Problem List   Diagnosis    Depression with anxiety    Osteoporosis    Asthma    Hyperlipidemia    Mitral regurgitation    Obstructive sleep apnea syndrome    Psoriasis    Diabetes mellitus ([address]    Parkinson's disease ([address]    Primary hypertension    Microalbuminuria    Morbid obesity (HCC)    RLS (restless legs syndrome)    Generalized weakness    Inability to walk    Hypothyroidism    Chest pain    Acute asthma exacerbation    Asthma exacerbation, mild    New onset a-fib (HCC)    Atrial fibrillation with RVR (HCC)    Acute on chronic congestive heart failure (HCC)    Coronary artery disease involving native coronary artery of native heart without angina pectoris         CPT code:  29881  bedside swallow eval    INTERVENTION  CPT Code: 76830  dysphagia tx    Eduacted pt on the s/s of aspiration. Discussed with patient that parkinson's can affect the swallow. SLP recommended video swallow and pt was agreeable. Sanjeev HARMAN.   Speech Language Pathology

## 2022-04-16 LAB
ANION GAP SERPL CALCULATED.3IONS-SCNC: 9 MMOL/L (ref 7–16)
BUN BLDV-MCNC: 16 MG/DL (ref 6–23)
CALCIUM SERPL-MCNC: 8.4 MG/DL (ref 8.6–10.2)
CHLORIDE BLD-SCNC: 101 MMOL/L (ref 98–107)
CO2: 29 MMOL/L (ref 22–29)
CREAT SERPL-MCNC: 1.5 MG/DL (ref 0.5–1)
GFR AFRICAN AMERICAN: 41
GFR NON-AFRICAN AMERICAN: 34 ML/MIN/1.73
GLUCOSE BLD-MCNC: 115 MG/DL (ref 74–99)
METER GLUCOSE: 126 MG/DL (ref 74–99)
METER GLUCOSE: 147 MG/DL (ref 74–99)
METER GLUCOSE: 163 MG/DL (ref 74–99)
METER GLUCOSE: 180 MG/DL (ref 74–99)
POTASSIUM SERPL-SCNC: 3.7 MMOL/L (ref 3.5–5)
SODIUM BLD-SCNC: 139 MMOL/L (ref 132–146)

## 2022-04-16 PROCEDURE — APPSS30 APP SPLIT SHARED TIME 16-30 MINUTES: Performed by: NURSE PRACTITIONER

## 2022-04-16 PROCEDURE — 99233 SBSQ HOSP IP/OBS HIGH 50: CPT | Performed by: FAMILY MEDICINE

## 2022-04-16 PROCEDURE — 2580000003 HC RX 258: Performed by: NURSE PRACTITIONER

## 2022-04-16 PROCEDURE — 99233 SBSQ HOSP IP/OBS HIGH 50: CPT | Performed by: INTERNAL MEDICINE

## 2022-04-16 PROCEDURE — 82962 GLUCOSE BLOOD TEST: CPT

## 2022-04-16 PROCEDURE — 6360000002 HC RX W HCPCS: Performed by: NURSE PRACTITIONER

## 2022-04-16 PROCEDURE — 94640 AIRWAY INHALATION TREATMENT: CPT

## 2022-04-16 PROCEDURE — 80048 BASIC METABOLIC PNL TOTAL CA: CPT

## 2022-04-16 PROCEDURE — 2060000000 HC ICU INTERMEDIATE R&B

## 2022-04-16 PROCEDURE — 6370000000 HC RX 637 (ALT 250 FOR IP): Performed by: NURSE PRACTITIONER

## 2022-04-16 PROCEDURE — 36415 COLL VENOUS BLD VENIPUNCTURE: CPT

## 2022-04-16 PROCEDURE — 6370000000 HC RX 637 (ALT 250 FOR IP): Performed by: CLINICAL NURSE SPECIALIST

## 2022-04-16 RX ORDER — DOCUSATE SODIUM 100 MG/1
100 CAPSULE, LIQUID FILLED ORAL DAILY
Status: DISCONTINUED | OUTPATIENT
Start: 2022-04-16 | End: 2022-04-19 | Stop reason: HOSPADM

## 2022-04-16 RX ADMIN — VENLAFAXINE HYDROCHLORIDE 150 MG: 150 CAPSULE, EXTENDED RELEASE ORAL at 08:13

## 2022-04-16 RX ADMIN — ALPRAZOLAM 0.5 MG: 0.25 TABLET ORAL at 12:34

## 2022-04-16 RX ADMIN — BUDESONIDE 500 MCG: 0.5 SUSPENSION RESPIRATORY (INHALATION) at 09:04

## 2022-04-16 RX ADMIN — MONTELUKAST SODIUM 10 MG: 10 TABLET, FILM COATED ORAL at 21:05

## 2022-04-16 RX ADMIN — CARBIDOPA AND LEVODOPA 2 TABLET: 25; 100 TABLET, EXTENDED RELEASE ORAL at 21:05

## 2022-04-16 RX ADMIN — ARFORMOTEROL TARTRATE 15 MCG: 15 SOLUTION RESPIRATORY (INHALATION) at 19:49

## 2022-04-16 RX ADMIN — APIXABAN 5 MG: 5 TABLET, FILM COATED ORAL at 08:13

## 2022-04-16 RX ADMIN — INSULIN LISPRO 1 UNITS: 100 INJECTION, SOLUTION INTRAVENOUS; SUBCUTANEOUS at 17:00

## 2022-04-16 RX ADMIN — CARBIDOPA AND LEVODOPA 2 TABLET: 25; 100 TABLET, EXTENDED RELEASE ORAL at 08:13

## 2022-04-16 RX ADMIN — ROPINIROLE HYDROCHLORIDE 1 MG: 1 TABLET, FILM COATED ORAL at 21:06

## 2022-04-16 RX ADMIN — ROPINIROLE HYDROCHLORIDE 1 MG: 1 TABLET, FILM COATED ORAL at 08:13

## 2022-04-16 RX ADMIN — LAMOTRIGINE 200 MG: 100 TABLET ORAL at 08:14

## 2022-04-16 RX ADMIN — BUSPIRONE HYDROCHLORIDE 5 MG: 5 TABLET ORAL at 15:45

## 2022-04-16 RX ADMIN — ARFORMOTEROL TARTRATE 15 MCG: 15 SOLUTION RESPIRATORY (INHALATION) at 09:04

## 2022-04-16 RX ADMIN — PANTOPRAZOLE SODIUM 40 MG: 40 TABLET, DELAYED RELEASE ORAL at 06:01

## 2022-04-16 RX ADMIN — METOPROLOL SUCCINATE 25 MG: 25 TABLET, FILM COATED, EXTENDED RELEASE ORAL at 08:14

## 2022-04-16 RX ADMIN — BUSPIRONE HYDROCHLORIDE 5 MG: 5 TABLET ORAL at 21:07

## 2022-04-16 RX ADMIN — APIXABAN 5 MG: 5 TABLET, FILM COATED ORAL at 21:05

## 2022-04-16 RX ADMIN — BUSPIRONE HYDROCHLORIDE 5 MG: 5 TABLET ORAL at 08:13

## 2022-04-16 RX ADMIN — ONDANSETRON 4 MG: 2 INJECTION INTRAMUSCULAR; INTRAVENOUS at 12:06

## 2022-04-16 RX ADMIN — ISOSORBIDE MONONITRATE 30 MG: 30 TABLET, EXTENDED RELEASE ORAL at 08:13

## 2022-04-16 RX ADMIN — CARBIDOPA AND LEVODOPA 2 TABLET: 25; 100 TABLET, EXTENDED RELEASE ORAL at 15:45

## 2022-04-16 RX ADMIN — Medication 10 ML: at 08:13

## 2022-04-16 RX ADMIN — Medication 1000 UNITS: at 08:13

## 2022-04-16 RX ADMIN — INSULIN LISPRO 1 UNITS: 100 INJECTION, SOLUTION INTRAVENOUS; SUBCUTANEOUS at 06:01

## 2022-04-16 RX ADMIN — BUDESONIDE 500 MCG: 0.5 SUSPENSION RESPIRATORY (INHALATION) at 19:49

## 2022-04-16 RX ADMIN — ATORVASTATIN CALCIUM 20 MG: 40 TABLET, FILM COATED ORAL at 21:05

## 2022-04-16 RX ADMIN — ROPINIROLE HYDROCHLORIDE 1 MG: 1 TABLET, FILM COATED ORAL at 15:45

## 2022-04-16 RX ADMIN — Medication 10 ML: at 21:05

## 2022-04-16 RX ADMIN — INSULIN LISPRO 1 UNITS: 100 INJECTION, SOLUTION INTRAVENOUS; SUBCUTANEOUS at 21:04

## 2022-04-16 RX ADMIN — DOXEPIN HYDROCHLORIDE 10 MG: 10 CAPSULE ORAL at 21:05

## 2022-04-16 RX ADMIN — INSULIN GLARGINE 10 UNITS: 100 INJECTION, SOLUTION SUBCUTANEOUS at 21:04

## 2022-04-16 ASSESSMENT — PAIN SCALES - GENERAL
PAINLEVEL_OUTOF10: 0

## 2022-04-16 NOTE — PLAN OF CARE
Problem: Falls - Risk of:  Goal: Absence of physical injury  Description: Absence of physical injury  Outcome: Met This Shift     Problem: Cardiac:  Goal: Ability to maintain an adequate cardiac output will improve  Description: Ability to maintain an adequate cardiac output will improve  Outcome: Ongoing  Goal: Complications related to the disease process, condition or treatment will be avoided or minimized  Description: Complications related to the disease process, condition or treatment will be avoided or minimized  Outcome: Ongoing

## 2022-04-16 NOTE — PROGRESS NOTES
[address] [place], [place] [address]ist   Progress Note    Admitting Date and Time: 4/14/2022  2:45 PM  Admit Dx: Acute renal insufficiency [N28.9]  New onset a-fib ([address] [I48.91]  Atrial fibrillation with RVR ([address] [I48.91]  Acute on chronic congestive heart failure, unspecified heart failure type ([facility] [I50.9]    Subjective:    Patient seen and examined. Voicing concerns over her heart rate not yet controlled. Denies p[alpitations, chest pain , SOB. Assisted to bed side recliner. Requesting stool softener        ROS:   Denies CP, SOB, subjective fever, chills, N/V/D, abdominal pain,  HA. All systems reviewed and negative unless otherwise documented.       docusate sodium  100 mg Oral Daily    metoprolol succinate  25 mg Oral BID    apixaban  5 mg Oral BID    atorvastatin  20 mg Oral Daily    busPIRone  5 mg Oral TID    insulin lispro  0-6 Units SubCUTAneous TID WC    insulin lispro  0-3 Units SubCUTAneous Nightly    Vitamin D  1,000 Units Oral Daily    doxepin  10 mg Oral Nightly    insulin glargine  10 Units SubCUTAneous Nightly    isosorbide mononitrate  30 mg Oral Daily    lamoTRIgine  200 mg Oral Daily    montelukast  10 mg Oral Nightly    pantoprazole  40 mg Oral QAM AC    rOPINIRole  1 mg Oral TID    venlafaxine  150 mg Oral Daily    sodium chloride flush  5-40 mL IntraVENous 2 times per day    Arformoterol Tartrate  15 mcg Nebulization BID    And    budesonide  0.5 mg Nebulization BID    carbidopa-levodopa  2 tablet Oral TID     ALPRAZolam, 0.5 mg, Daily PRN  glucose, 15 g, PRN  glucagon (rDNA), 1 mg, PRN  dextrose, 100 mL/hr, PRN  sodium chloride flush, 5-40 mL, PRN  sodium chloride, , PRN  ondansetron, 4 mg, Q8H PRN   Or  ondansetron, 4 mg, Q6H PRN  polyethylene glycol, 17 g, Daily PRN  acetaminophen, 650 mg, Q6H PRN   Or  acetaminophen, 650 mg, Q6H PRN  dextrose bolus (hypoglycemia), 125 mL, PRN   Or  dextrose bolus (hypoglycemia), 250 mL, PRN  albuterol, 2.5 mg, Q4H PRN Objective:    /64   Pulse 86   Temp 98 °F (36.7 °C) (Oral)   Resp 20   Ht 5' (1.524 m)   Wt 232 lb 1.6 oz (105.3 kg)   SpO2 95%   BMI 45.33 kg/m²   General Appearance: alert and oriented to person, place and time and in no acute distress  Skin: warm and dry  Head: normocephalic and atraumatic  Eyes: pupils equal, round, and reactive to light, extraocular eye movements intact, conjunctivae normal  Neck: neck supple and non tender without mass   Pulmonary/Chest: clear to auscultation bilaterally- no wheezes, rales or rhonchi, normal air movement, no respiratory distress  Cardiovascular: tachy  rate, irregular rhythm, normal S1 and S2 and no carotid bruits  Abdomen: soft, non-tender, non-distended, normal bowel sounds, no masses or organomegaly  Extremities: no cyanosis, no clubbing and no edema  Neurologic: no cranial nerve deficit and speech normal      Recent Labs     04/14/22 1557 04/15/22  1000 04/16/22  0255    141 139   K 4.0 4.3 3.7    103 101   CO2 28 29 29   BUN 12 15 16   CREATININE 1.2* 1.3* 1.5*   GLUCOSE 146* 163* 115*   CALCIUM 8.5* 8.7 8.4*       Recent Labs     04/14/22  1557 04/15/22  1000   ALKPHOS 98 110*   PROT 5.8* 6.3*   LABALBU 3.7 3.9   BILITOT 0.6 0.5   AST 7 8   ALT <5 <5       Recent Labs     04/14/22  1557 04/15/22  1000   WBC 4.9 5.8   RBC 3.86 4.20   HGB 10.0* 11.1*   HCT 34.2 37.1   MCV 88.6 88.3   MCH 25.9* 26.4   MCHC 29.2* 29.9*   RDW 16.6* 17.1*    235   MPV 9.8 9.5         Radiology:   Fluoroscopy modified barium swallow with video   Final Result   Thin liquid aspiration when using a straw. Otherwise satisfactory swallowing   mechanism. Please see separate speech pathology report for full discussion of findings   and recommendations. CT Head WO Contrast   Final Result   No acute intracranial abnormality or hemorrhage. CT Cervical Spine WO Contrast   Final Result   No acute abnormality of the cervical spine.          XR CHEST PORTABLE   Final Result   Cardiomegaly and mild pulmonary venous congestion. No pneumonia. CT Head WO Contrast    Result Date: 4/14/2022  EXAMINATION: CT OF THE HEAD WITHOUT CONTRAST  4/14/2022 3:10 pm TECHNIQUE: CT of the head was performed without the administration of intravenous contrast. Dose modulation, iterative reconstruction, and/or weight based adjustment of the mA/kV was utilized to reduce the radiation dose to as low as reasonably achievable. COMPARISON: March 06/20/2022 HISTORY: ORDERING SYSTEM PROVIDED HISTORY: fall TECHNOLOGIST PROVIDED HISTORY: Reason for exam:->fall Has a \"code stroke\" or \"stroke alert\" been called? ->No Decision Support Exception - unselect if not a suspected or confirmed emergency medical condition->Emergency Medical Condition (MA) FINDINGS: BRAIN/VENTRICLES: There are mild age related cortical atrophy and periventricular white matter ischemic changes. There is no acute intracranial hemorrhage, mass effect or midline shift. No abnormal extra-axial fluid collection. The gray-white differentiation is maintained without evidence of an acute infarct. There is no evidence of hydrocephalus. ORBITS: The visualized portion of the orbits demonstrate no acute abnormality. SINUSES: The visualized paranasal sinuses and mastoid air cells demonstrate no acute abnormality. SOFT TISSUES/SKULL:  No acute abnormality of the visualized skull or soft tissues. No acute intracranial abnormality or hemorrhage. CT Cervical Spine WO Contrast    Result Date: 4/14/2022  EXAMINATION: CT OF THE CERVICAL SPINE WITHOUT CONTRAST 4/14/2022 3:10 pm TECHNIQUE: CT of the cervical spine was performed without the administration of intravenous contrast. Multiplanar reformatted images are provided for review. Dose modulation, iterative reconstruction, and/or weight based adjustment of the mA/kV was utilized to reduce the radiation dose to as low as reasonably achievable.  COMPARISON: March 26, 2022 HISTORY: ORDERING SYSTEM PROVIDED HISTORY: fall TECHNOLOGIST PROVIDED HISTORY: Reason for exam:->fall Decision Support Exception - unselect if not a suspected or confirmed emergency medical condition->Emergency Medical Condition (MA) FINDINGS: BONES/ALIGNMENT: There is no acute fracture or traumatic malalignment. There is straightening of normal lordotic curvature likely due to muscular spasm and/or positioning of the neck. DEGENERATIVE CHANGES: No significant degenerative changes. SOFT TISSUES: There is no prevertebral soft tissue swelling. No acute abnormality of the cervical spine. XR CHEST PORTABLE    Result Date: 4/14/2022  EXAMINATION: ONE XRAY VIEW OF THE CHEST PORTABLE UPRIGHT 4/14/2022 3:10 pm COMPARISON: 03/16/2022 HISTORY: ORDERING SYSTEM PROVIDED HISTORY: left rib pain TECHNOLOGIST PROVIDED HISTORY: Reason for exam:->left rib pain FINDINGS: Chest evaluation partly limited by portable technique and large body habitus. Mild cardiomegaly, unchanged. Cephalization of the pulmonary vascularity within the right upper lobe in keeping with mild pulmonary venous congestion. No focal infiltrate or pleural effusion. No pneumothorax. Cardiomegaly and mild pulmonary venous congestion. No pneumonia. Assessment:  Principal Problem:    New onset a-fib ([address]  Active Problems:    Asthma    Hyperlipidemia    Obstructive sleep apnea syndrome    Diabetes mellitus ([address]    Parkinson's disease ([address]    Primary hypertension    Morbid obesity (HCC)    Acute on chronic congestive heart failure (HCC)    Coronary artery disease involving native coronary artery of native heart without angina pectoris    Dysphagia  Resolved Problems:    * No resolved hospital problems. *      Plan:  1. afib RVR, new onset: Seen by cardiology, given dose IV digoxin (04/15). Toprol XL increased to 25 mg BID, Eliquis started 5mg BID  per same. TSH 1.030. Consider outpatient cardioversion if she doesn't convert.    2. CAD: on statin. Left heart cath Jan 2022 with 50% stenosis . ASA stopped per ardiology  3. DM2: A1c 6.9. Blood sugars stable. Continue home Lantus, carb conrol diet, low dose insulin slide scale, hypoglycemia protocol  4. HTN:  With intermittent hypotension. Continue Imdur, amlodipine, metoprolol with holding parameters. Cozaar held per cardiology  5. Parkinson's: resume home sinemet  6. HLD: continue  home statin. Lipid panel with elevated triglycerides. 7. SERENA: PAP  8.  Asthma: resume home meds           Electronically signed by RASHARD Manriquez CNP on 4/16/2022 at 9:34 AM

## 2022-04-16 NOTE — PROGRESS NOTES
INPATIENT CARDIOLOGY FOLLOW-UP    Name: Hui Beckett    Age: 66 y.o. Date of Admission: 4/14/2022  2:45 PM    Date of Service: 4/16/2022    Primary Cardiologist: Dr. Deisy English    Chief Complaint: Follow-up for new onset atrial fibrillation. Interim History:  No new overnight cardiac complaints. Currently with no complaints of CP, SOB, palpitations, dizziness, or lightheadedness. In atrial fibrillation with rates in 100s. Was given 60 mg oral Cardizem x1. Was also given IV digoxin x1. Did have some RVR yesterday around the evening.   Review of Systems:   Negative except as described above    Problem List:  Patient Active Problem List   Diagnosis    Depression with anxiety    Osteoporosis    Asthma    Hyperlipidemia    Mitral regurgitation    Obstructive sleep apnea syndrome    Psoriasis    Diabetes mellitus ([facility]    Parkinson's disease ([facility]    Primary hypertension    Microalbuminuria    Morbid obesity ([facility]    RLS (restless legs syndrome)    Generalized weakness    Inability to walk    Hypothyroidism    Chest pain    Acute asthma exacerbation    Asthma exacerbation, mild    New onset a-fib (HCC)    Atrial fibrillation with RVR (HCC)    Acute on chronic congestive heart failure ([address]    Coronary artery disease involving native coronary artery of native heart without angina pectoris    Dysphagia       Current Medications:    Current Facility-Administered Medications:     docusate sodium (COLACE) capsule 100 mg, 100 mg, Oral, Daily, RASHARD Escobar CNP    metoprolol succinate (TOPROL XL) extended release tablet 25 mg, 25 mg, Oral, BID, Queta Rose Marie, APRN - CNS, 25 mg at 04/16/22 9869    apixaban (ELIQUIS) tablet 5 mg, 5 mg, Oral, BID, Qeutaestella Trotter APRN - CNS, 5 mg at 04/16/22 0813    ALPRAZolam Unice Nigh) tablet 0.5 mg, 0.5 mg, Oral, Daily PRN, RASHARD Escobar CNP, 0.5 mg at 04/16/22 1234    atorvastatin (LIPITOR) tablet 20 mg, 20 mg, Oral, Daily, Becky Sachs, APRN - CNP, 20 mg at 04/15/22 2005    busPIRone (BUSPAR) tablet 5 mg, 5 mg, Oral, TID, Becky Sachs, APRN - CNP, 5 mg at 04/16/22 0813    insulin lispro (HUMALOG) injection vial 0-6 Units, 0-6 Units, SubCUTAneous, TID WC, Becky Sachs, APRN - CNP, 1 Units at 04/16/22 0601    insulin lispro (HUMALOG) injection vial 0-3 Units, 0-3 Units, SubCUTAneous, Nightly, Becky Sachs, APRN - CNP, 1 Units at 04/14/22 2319    glucose (GLUTOSE) 40 % oral gel 15 g, 15 g, Oral, PRN, Becky Sachs, APRN - CNP    glucagon (rDNA) injection 1 mg, 1 mg, IntraMUSCular, PRN, Becky Sachs, APRN - CNP    dextrose 5 % solution, 100 mL/hr, IntraVENous, PRN, Becky Sachs, APRN - CNP    Vitamin D (CHOLECALCIFEROL) tablet 1,000 Units, 1,000 Units, Oral, Daily, Becky Sachs, APRN - CNP, 1,000 Units at 04/16/22 0813    doxepin (SINEQUAN) capsule 10 mg, 10 mg, Oral, Nightly, Becky Sachs, APRN - CNP, 10 mg at 04/15/22 2004    insulin glargine (LANTUS) injection vial 10 Units, 10 Units, SubCUTAneous, Nightly, Becky Sachs, APRN - CNP, 10 Units at 04/14/22 2320    isosorbide mononitrate (IMDUR) extended release tablet 30 mg, 30 mg, Oral, Daily, Becky Sachs, APRN - CNP, 30 mg at 04/16/22 0813    lamoTRIgine (LAMICTAL) tablet 200 mg, 200 mg, Oral, Daily, Becky Sachs, APRN - CNP, 200 mg at 04/16/22 4370    montelukast (SINGULAIR) tablet 10 mg, 10 mg, Oral, Nightly, Becky Sachs, APRN - CNP, 10 mg at 04/15/22 2004    pantoprazole (PROTONIX) tablet 40 mg, 40 mg, Oral, QAM AC, Becky Sachs, APRN - CNP, 40 mg at 04/16/22 0601    rOPINIRole (REQUIP) tablet 1 mg, 1 mg, Oral, TID, RASHARD Nettles CNP, 1 mg at 04/16/22 0813    venlafaxine (EFFEXOR XR) extended release capsule 150 mg, 150 mg, Oral, Daily, RASHARD Nettles CNP, 150 mg at 04/16/22 0813    sodium chloride flush 0.9 % injection 5-40 mL, 5-40 mL, IntraVENous, 2 times per day, RASHARD Nettles CNP, 10 mL at 04/16/22 0813    sodium chloride flush 0.9 % injection 5-40 mL, 5-40 mL, IntraVENous, PRN, Johnell Peabody, APRN - CNP    0.9 % sodium chloride infusion, , IntraVENous, PRN, Johnell Peabody, APRN - CNP    ondansetron (ZOFRAN-ODT) disintegrating tablet 4 mg, 4 mg, Oral, Q8H PRN **OR** ondansetron (ZOFRAN) injection 4 mg, 4 mg, IntraVENous, Q6H PRN, Johnell Peabody, APRN - CNP, 4 mg at 04/16/22 1206    polyethylene glycol (GLYCOLAX) packet 17 g, 17 g, Oral, Daily PRN, Johnell Peabody, APRN - CNP    acetaminophen (TYLENOL) tablet 650 mg, 650 mg, Oral, Q6H PRN **OR** acetaminophen (TYLENOL) suppository 650 mg, 650 mg, Rectal, Q6H PRN, Johnell Peabody, APRN - CNP    dextrose bolus (hypoglycemia) 10% 125 mL, 125 mL, IntraVENous, PRN **OR** dextrose bolus (hypoglycemia) 10% 250 mL, 250 mL, IntraVENous, PRN, Johnell Peabody, APRN - CNP    albuterol (PROVENTIL) nebulizer solution 2.5 mg, 2.5 mg, Nebulization, Q4H PRN, Johnell Peabody, APRN - CNP    Arformoterol Tartrate (BROVANA) nebulizer solution 15 mcg, 15 mcg, Nebulization, BID, 15 mcg at 04/16/22 0904 **AND** budesonide (PULMICORT) nebulizer suspension 500 mcg, 0.5 mg, Nebulization, BID, 500 mcg at 04/16/22 0904 **AND** Nebulizer tx intermittent, , , BID, Johnell Peabody, APRN - CNP    carbidopa-levodopa (SINEMET CR)  MG per extended release tablet 2 tablet, 2 tablet, Oral, TID, Johnell Peabody, APRN - CNP, 2 tablet at 04/16/22 0813    Physical Exam:  /64   Pulse 86   Temp 98 °F (36.7 °C) (Oral)   Resp 20   Ht 5' (1.524 m)   Wt 232 lb 1.6 oz (105.3 kg)   SpO2 95%   BMI 45.33 kg/m²   Wt Readings from Last 3 Encounters:   04/16/22 232 lb 1.6 oz (105.3 kg)   03/26/22 220 lb (99.8 kg)   03/17/22 227 lb 6.4 oz (103.1 kg)     Appearance: Awake, alert, no acute respiratory distress  Skin: Intact, no rash  Head: Normocephalic, atraumatic  Eyes: EOMI, no conjunctival erythema  ENMT: No pharyngeal erythema, MMM, no rhinorrhea  Neck: Supple, no elevated JVP, no carotid bruits  Lungs: Clear to auscultation bilaterally. No wheezes, rales, or rhonchi.   Cardiac: PMI nondisplaced, Regular rhythm with a normal rate, S1 & S2 normal, no murmurs  Abdomen: Soft, nontender, +bowel sounds  Extremities: Moves all extremities x 4, no lower extremity edema  Neurologic: No focal motor deficits apparent, normal mood and affect  Peripheral Pulses: Intact posterior tibial pulses bilaterally    Intake/Output:    Intake/Output Summary (Last 24 hours) at 4/16/2022 1517  Last data filed at 4/16/2022 1230  Gross per 24 hour   Intake 120 ml   Output 850 ml   Net -730 ml     I/O this shift:  In: 120 [P.O.:120]  Out: -     Laboratory Tests:  Recent Labs     04/14/22  1557 04/15/22  1000 04/16/22  0255    141 139   K 4.0 4.3 3.7    103 101   CO2 28 29 29   BUN 12 15 16   CREATININE 1.2* 1.3* 1.5*   GLUCOSE 146* 163* 115*   CALCIUM 8.5* 8.7 8.4*     Lab Results   Component Value Date    MG 2.0 04/15/2022     Recent Labs     04/14/22  1557 04/15/22  1000   ALKPHOS 98 110*   ALT <5 <5   AST 7 8   PROT 5.8* 6.3*   BILITOT 0.6 0.5   LABALBU 3.7 3.9     Recent Labs     04/14/22  1557 04/15/22  1000   WBC 4.9 5.8   RBC 3.86 4.20   HGB 10.0* 11.1*   HCT 34.2 37.1   MCV 88.6 88.3   MCH 25.9* 26.4   MCHC 29.2* 29.9*   RDW 16.6* 17.1*    235   MPV 9.8 9.5     Lab Results   Component Value Date    CKTOTAL 32 04/25/2014    CKMB 2.1 04/25/2014    TROPONINI <0.01 10/26/2020    TROPONINI <0.01 08/24/2019    TROPONINI <0.01 05/29/2019     Lab Results   Component Value Date    INR 1.3 04/15/2022    INR 1.2 02/09/2021    INR 1.4 02/04/2021    PROTIME 13.9 (H) 04/15/2022    PROTIME 13.5 (H) 02/09/2021    PROTIME 15.8 (H) 02/04/2021     Lab Results   Component Value Date    TSH 1.030 04/14/2022     Lab Results   Component Value Date    LABA1C 6.9 (H) 03/16/2022     No results found for: EAG  Lab Results   Component Value Date    CHOL 97 04/15/2022    CHOL 146 03/18/2022    CHOL 148 10/25/2021     Lab Results   Component Value Date    TRIG 171 (H) 04/15/2022    TRIG 134 03/18/2022    TRIG 144 10/25/2021     Lab Results   Component Value Date    HDL 46 04/15/2022    HDL 50 03/18/2022    HDL 59 10/25/2021     Lab Results   Component Value Date    LDLCALC 17 04/15/2022    LDLCALC 69 03/18/2022    LDLCALC 60 10/25/2021     Lab Results   Component Value Date    LABVLDL 34 04/15/2022    LABVLDL 27 03/18/2022    LABVLDL 29 10/25/2021    VLDL 84 09/19/2017     Lab Results   Component Value Date    CHOLHDLRATIO 3.3 03/13/2019    CHOLHDLRATIO 3.8 12/11/2018    CHOLHDLRATIO 3.0 09/04/2018     Recent Labs     04/15/22  0325   PROBNP 3,670*       Cardiac Tests:  1. 08/10/11 Lexiscan MPS ([facility]):  Normal Lexiscan portion.  No evidence for inducible ischemia.  No previous myocardial infarction.  Ejection fraction 77%. 2. Lexiscan MPS 04/25/2014: Reversible inferolateral wall perfusion defect. Finding suggests left ventricular myocardium at risk for stress-induced ischemia. EF >70%. 3. TTE 04/27/2014 (Dr. Mariee Fall left ventricle size and systolic function. No wall motion abnormalities. Mild concentric left ventricular hypertrophy. Ejection fraction is visually estimated at >55%. Normal right ventricular size and function. Aortic root is sclerotic and calcified  4. Cardiac Catheterization (Dr. Frances Aldana) 04/28/2014: Left main: Woo Sol left main artery is a short vessel which did not appear to have any significant angiographic disease. Left anterior descending:  The left anterior descending artery is a moderate-sized vessel which has minor luminal irregularities in its middle segment but without any significant angiographic luminal narrowing.  The diagonal branches also did not appear to have any significant disease. Left circumflex:  The left circumflex is a large, codominant vessel which did not appear to have any significant angiographic disease.  Right coronary artery:  The right coronary artery is a moderate-sized codominant vessel which did not appear to have any significant angiographic disease. LEFT VENTRICULOGRAPHY:  The left ventricle is normal in size and contractility with an estimated ejection fraction of 60% to 65%.  There was no mitral regurgitation. RIGHT FEMORAL ARTERY ANGIOGRAPHY:  The right common femoral artery and the proximal segments of the right superficial femoral artery and the right profunda did not appear to have any significant disease.   5. TTE 02/07/2021 (Dr. Michael Peraltak left ventricle size and systolic function. Ejection fraction is visually estimated at 65-70%. No regional wall motion abnormalities seen. Moderate concentric left ventricular hypertrophy. Normal right ventricular size and function. No systolic mitral regurgitation noted. No hemodynamically significant aortic stenosis is present. Unable to estimate PA systolic pressure. No evidence for hemodynamically significant pericardial effusion. Valves were not well visualized, please consider PAT if clinical suspicion for endocarditis is high  6. Lexiscan MPS 01/10/2022: ECG during the infusion did not change. The myocardial perfusion imaging was abnormal. The abnormality was a large sized, moderate fixed defect involving the inferior and inferolateral wall suggestive prior infarct without any reversibility. There was also a small sized mild perfusion defect involving the mid to distal anterior wall suggestive ischemia. Overall left ventricular systolic function was normal without regional wall motion abnormalities. No transient ischemic dilatation. High risk general pharmacologic stress test.  7. Cardiac catheterization 1/11/2022: CONCLUSIONS:  Coronary artery disease: Left main.  No significant disease.   LAD.  Heavily calcified proximal and mid vessel with 50% mid vessel stenosis.  60% proximal stenosis of a moderate size first diagonal branch.  LCX.  50% ostial narrowing of the AV groove branch in the mid vessel which is a bifurcation lesion with a large marginal branch which itself did not appear to have any significant disease.  The AV groove branch of the left circumflex continues to provide a posterior descending artery branch and the posterolateral branch (codominant vessel). RCA.  Codominant vessel with no significant angiographic disease. Normal left ventricular size with hyperdynamic left ventricular systolic function with an estimated ejection fraction of 75%. Systemic hypertension.  Elevated left ventricular end-diastolic pressure (18)  8. Echocardiogram 1/11/2022:  Left ventricle is normal in size.  Moderate concentric left ventricular hypertrophy. No regional wall motion abnormalities seen.  Ejection fraction is visually estimated at 70+/-5%.  There is doppler evidence of stage I diastolic dysfunction.  Mildly dilated right ventricle.  Right ventricle global systolic function is normal ( TAPSE = 1.8 ). Normal size atria.  No significant valvular abnormalities noted. Impression:   1. New onset atrial fibrillation, unknown duration. [id number]  2. Mild acute on chronic HFpEF  3. Moderate CAD  4. Morbid obesity  5. Untreated sleep apnea     Recommendations:     · Currently on Toprol-XL 25 mg twice daily. Consider addition of Cardizem 30 mg every 6 if rate still elevated beyond 120 bpm.  If pressures continue to be borderline, can give another one-time dose of digoxin 125 mcg   · If rates are not better controlled, we will schedule for a PAT guided cardioversion on Monday. · Hold losartan, hold Norvasc  · Was given 1 dose of IV Lasix today. We will continue to monitor volume status  · Has been started on Eliquis. Continue aspirin. · Unlikely to remain in sinus rhythm without treatment of sleep apnea and weight loss  · Aggressive risk factor modification      Idalia Callahan MD, [address] [facility]    NOTE: This report was transcribed using voice recognition software. Every effort was made to ensure accuracy; however, inadvertent computerized transcription errors may be present.

## 2022-04-17 ENCOUNTER — APPOINTMENT (OUTPATIENT)
Dept: CT IMAGING | Age: 72
DRG: 308 | End: 2022-04-17
Payer: MEDICARE

## 2022-04-17 ENCOUNTER — ANESTHESIA EVENT (OUTPATIENT)
Dept: NON INVASIVE DIAGNOSTICS | Age: 72
DRG: 308 | End: 2022-04-17
Payer: MEDICARE

## 2022-04-17 ENCOUNTER — ANESTHESIA (OUTPATIENT)
Dept: NON INVASIVE DIAGNOSTICS | Age: 72
DRG: 308 | End: 2022-04-17
Payer: MEDICARE

## 2022-04-17 LAB
ANION GAP SERPL CALCULATED.3IONS-SCNC: 6 MMOL/L (ref 7–16)
BUN BLDV-MCNC: 17 MG/DL (ref 6–23)
CALCIUM SERPL-MCNC: 8.4 MG/DL (ref 8.6–10.2)
CHLORIDE BLD-SCNC: 101 MMOL/L (ref 98–107)
CO2: 33 MMOL/L (ref 22–29)
CREAT SERPL-MCNC: 1.3 MG/DL (ref 0.5–1)
GFR AFRICAN AMERICAN: 49
GFR NON-AFRICAN AMERICAN: 40 ML/MIN/1.73
GLUCOSE BLD-MCNC: 196 MG/DL (ref 74–99)
METER GLUCOSE: 122 MG/DL (ref 74–99)
METER GLUCOSE: 127 MG/DL (ref 74–99)
METER GLUCOSE: 135 MG/DL (ref 74–99)
METER GLUCOSE: 161 MG/DL (ref 74–99)
POTASSIUM SERPL-SCNC: 4.6 MMOL/L (ref 3.5–5)
SODIUM BLD-SCNC: 140 MMOL/L (ref 132–146)
URINE CULTURE, ROUTINE: NORMAL

## 2022-04-17 PROCEDURE — 6370000000 HC RX 637 (ALT 250 FOR IP): Performed by: CLINICAL NURSE SPECIALIST

## 2022-04-17 PROCEDURE — 36415 COLL VENOUS BLD VENIPUNCTURE: CPT

## 2022-04-17 PROCEDURE — 99233 SBSQ HOSP IP/OBS HIGH 50: CPT | Performed by: FAMILY MEDICINE

## 2022-04-17 PROCEDURE — APPSS30 APP SPLIT SHARED TIME 16-30 MINUTES: Performed by: NURSE PRACTITIONER

## 2022-04-17 PROCEDURE — 94640 AIRWAY INHALATION TREATMENT: CPT

## 2022-04-17 PROCEDURE — 6370000000 HC RX 637 (ALT 250 FOR IP): Performed by: NURSE PRACTITIONER

## 2022-04-17 PROCEDURE — 2580000003 HC RX 258: Performed by: INTERNAL MEDICINE

## 2022-04-17 PROCEDURE — 99233 SBSQ HOSP IP/OBS HIGH 50: CPT | Performed by: INTERNAL MEDICINE

## 2022-04-17 PROCEDURE — 6370000000 HC RX 637 (ALT 250 FOR IP): Performed by: FAMILY MEDICINE

## 2022-04-17 PROCEDURE — 6360000002 HC RX W HCPCS: Performed by: NURSE PRACTITIONER

## 2022-04-17 PROCEDURE — 2580000003 HC RX 258: Performed by: NURSE PRACTITIONER

## 2022-04-17 PROCEDURE — 74177 CT ABD & PELVIS W/CONTRAST: CPT

## 2022-04-17 PROCEDURE — 6360000004 HC RX CONTRAST MEDICATION: Performed by: RADIOLOGY

## 2022-04-17 PROCEDURE — 82962 GLUCOSE BLOOD TEST: CPT

## 2022-04-17 PROCEDURE — 80048 BASIC METABOLIC PNL TOTAL CA: CPT

## 2022-04-17 PROCEDURE — 2060000000 HC ICU INTERMEDIATE R&B

## 2022-04-17 RX ORDER — SODIUM CHLORIDE 9 MG/ML
25 INJECTION, SOLUTION INTRAVENOUS PRN
Status: DISCONTINUED | OUTPATIENT
Start: 2022-04-17 | End: 2022-04-19 | Stop reason: HOSPADM

## 2022-04-17 RX ORDER — SODIUM CHLORIDE 0.9 % (FLUSH) 0.9 %
5-40 SYRINGE (ML) INJECTION PRN
Status: DISCONTINUED | OUTPATIENT
Start: 2022-04-17 | End: 2022-04-19 | Stop reason: HOSPADM

## 2022-04-17 RX ORDER — SODIUM CHLORIDE 0.9 % (FLUSH) 0.9 %
5-40 SYRINGE (ML) INJECTION EVERY 12 HOURS SCHEDULED
Status: DISCONTINUED | OUTPATIENT
Start: 2022-04-17 | End: 2022-04-19 | Stop reason: HOSPADM

## 2022-04-17 RX ORDER — LIDOCAINE 4 G/G
1 PATCH TOPICAL DAILY
Status: DISCONTINUED | OUTPATIENT
Start: 2022-04-17 | End: 2022-04-19 | Stop reason: HOSPADM

## 2022-04-17 RX ORDER — SODIUM CHLORIDE 9 MG/ML
INJECTION, SOLUTION INTRAVENOUS CONTINUOUS
Status: DISCONTINUED | OUTPATIENT
Start: 2022-04-17 | End: 2022-04-17

## 2022-04-17 RX ORDER — 0.9 % SODIUM CHLORIDE 0.9 %
500 INTRAVENOUS SOLUTION INTRAVENOUS ONCE
Status: COMPLETED | OUTPATIENT
Start: 2022-04-17 | End: 2022-04-17

## 2022-04-17 RX ADMIN — ROPINIROLE HYDROCHLORIDE 1 MG: 1 TABLET, FILM COATED ORAL at 14:21

## 2022-04-17 RX ADMIN — Medication 10 ML: at 20:51

## 2022-04-17 RX ADMIN — BUDESONIDE 500 MCG: 0.5 SUSPENSION RESPIRATORY (INHALATION) at 12:00

## 2022-04-17 RX ADMIN — Medication 1000 UNITS: at 09:12

## 2022-04-17 RX ADMIN — ARFORMOTEROL TARTRATE 15 MCG: 15 SOLUTION RESPIRATORY (INHALATION) at 20:56

## 2022-04-17 RX ADMIN — PANTOPRAZOLE SODIUM 40 MG: 40 TABLET, DELAYED RELEASE ORAL at 06:28

## 2022-04-17 RX ADMIN — IOPAMIDOL 75 ML: 755 INJECTION, SOLUTION INTRAVENOUS at 19:52

## 2022-04-17 RX ADMIN — INSULIN GLARGINE 10 UNITS: 100 INJECTION, SOLUTION SUBCUTANEOUS at 21:29

## 2022-04-17 RX ADMIN — BUSPIRONE HYDROCHLORIDE 5 MG: 5 TABLET ORAL at 09:13

## 2022-04-17 RX ADMIN — DOXEPIN HYDROCHLORIDE 10 MG: 10 CAPSULE ORAL at 20:46

## 2022-04-17 RX ADMIN — CARBIDOPA AND LEVODOPA 2 TABLET: 25; 100 TABLET, EXTENDED RELEASE ORAL at 20:46

## 2022-04-17 RX ADMIN — Medication 10 ML: at 20:50

## 2022-04-17 RX ADMIN — Medication 10 ML: at 09:13

## 2022-04-17 RX ADMIN — BUSPIRONE HYDROCHLORIDE 5 MG: 5 TABLET ORAL at 14:21

## 2022-04-17 RX ADMIN — IOHEXOL 50 ML: 240 INJECTION, SOLUTION INTRATHECAL; INTRAVASCULAR; INTRAVENOUS; ORAL at 19:52

## 2022-04-17 RX ADMIN — BUSPIRONE HYDROCHLORIDE 5 MG: 5 TABLET ORAL at 20:46

## 2022-04-17 RX ADMIN — MONTELUKAST SODIUM 10 MG: 10 TABLET, FILM COATED ORAL at 20:45

## 2022-04-17 RX ADMIN — BUDESONIDE 500 MCG: 0.5 SUSPENSION RESPIRATORY (INHALATION) at 20:56

## 2022-04-17 RX ADMIN — CARBIDOPA AND LEVODOPA 2 TABLET: 25; 100 TABLET, EXTENDED RELEASE ORAL at 14:21

## 2022-04-17 RX ADMIN — CARBIDOPA AND LEVODOPA 2 TABLET: 25; 100 TABLET, EXTENDED RELEASE ORAL at 09:13

## 2022-04-17 RX ADMIN — ATORVASTATIN CALCIUM 20 MG: 40 TABLET, FILM COATED ORAL at 20:45

## 2022-04-17 RX ADMIN — APIXABAN 5 MG: 5 TABLET, FILM COATED ORAL at 09:12

## 2022-04-17 RX ADMIN — INSULIN LISPRO 1 UNITS: 100 INJECTION, SOLUTION INTRAVENOUS; SUBCUTANEOUS at 11:20

## 2022-04-17 RX ADMIN — LAMOTRIGINE 200 MG: 100 TABLET ORAL at 09:12

## 2022-04-17 RX ADMIN — SODIUM CHLORIDE 500 ML: 9 INJECTION, SOLUTION INTRAVENOUS at 09:26

## 2022-04-17 RX ADMIN — ROPINIROLE HYDROCHLORIDE 1 MG: 1 TABLET, FILM COATED ORAL at 09:12

## 2022-04-17 RX ADMIN — VENLAFAXINE HYDROCHLORIDE 150 MG: 150 CAPSULE, EXTENDED RELEASE ORAL at 09:13

## 2022-04-17 RX ADMIN — METOPROLOL SUCCINATE 25 MG: 25 TABLET, FILM COATED, EXTENDED RELEASE ORAL at 09:13

## 2022-04-17 RX ADMIN — ISOSORBIDE MONONITRATE 30 MG: 30 TABLET, EXTENDED RELEASE ORAL at 09:13

## 2022-04-17 RX ADMIN — ROPINIROLE HYDROCHLORIDE 1 MG: 1 TABLET, FILM COATED ORAL at 20:46

## 2022-04-17 RX ADMIN — APIXABAN 5 MG: 5 TABLET, FILM COATED ORAL at 20:45

## 2022-04-17 RX ADMIN — DOCUSATE SODIUM 100 MG: 100 CAPSULE, LIQUID FILLED ORAL at 09:13

## 2022-04-17 RX ADMIN — ARFORMOTEROL TARTRATE 15 MCG: 15 SOLUTION RESPIRATORY (INHALATION) at 11:59

## 2022-04-17 RX ADMIN — METOPROLOL SUCCINATE 25 MG: 25 TABLET, FILM COATED, EXTENDED RELEASE ORAL at 20:49

## 2022-04-17 ASSESSMENT — PAIN SCALES - GENERAL
PAINLEVEL_OUTOF10: 0

## 2022-04-17 ASSESSMENT — LIFESTYLE VARIABLES: SMOKING_STATUS: 0

## 2022-04-17 NOTE — PROGRESS NOTES
Rounded on pt at [phone], pt left with bedside table in front of her while in the chair and call light within reach. Returned to pt's room at 1800 to find pt sitting on the floor. Pt states she slid out of the chair onto the floor, no apparent injuries noted. Bedside and charge nurse notified. Pt returned to bed vital signs obtained, bed rails x3, bed alarm on, call light within reach.

## 2022-04-17 NOTE — PLAN OF CARE
Problem: Falls - Risk of:  Goal: Will remain free from falls  Description: Will remain free from falls  4/17/2022 1819 by Ana House RN  Outcome: Not Met This Shift  4/17/2022 0550 by Shruthi Ruiz RN  Outcome: Met This Shift  Goal: Absence of physical injury  Description: Absence of physical injury  4/17/2022 1819 by Ana House RN  Outcome: Met This Shift  4/17/2022 0550 by Shruthi Ruiz RN  Outcome: Met This Shift

## 2022-04-17 NOTE — PROGRESS NOTES
[address] [place], [place] [address]ist   Progress Note    Admitting Date and Time: 4/14/2022  2:45 PM  Admit Dx: Acute renal insufficiency [N28.9]  New onset a-fib ([facility] [I48.91]  Atrial fibrillation with RVR ([facility] [I48.91]  Acute on chronic congestive heart failure, unspecified heart failure type ([facility] [I50.9]    Subjective:    Patient seen and examined. Resting quietly in bed. Offers no complaints. No overnight issues per saff. ROS:   Denies CP, SOB, subjective fever, chills, N/V/D, abdominal pain,  HA. All systems reviewed and negative unless otherwise documented.       sodium chloride  500 mL IntraVENous Once    docusate sodium  100 mg Oral Daily    metoprolol succinate  25 mg Oral BID    apixaban  5 mg Oral BID    atorvastatin  20 mg Oral Daily    busPIRone  5 mg Oral TID    insulin lispro  0-6 Units SubCUTAneous TID WC    insulin lispro  0-3 Units SubCUTAneous Nightly    Vitamin D  1,000 Units Oral Daily    doxepin  10 mg Oral Nightly    insulin glargine  10 Units SubCUTAneous Nightly    isosorbide mononitrate  30 mg Oral Daily    lamoTRIgine  200 mg Oral Daily    montelukast  10 mg Oral Nightly    pantoprazole  40 mg Oral QAM AC    rOPINIRole  1 mg Oral TID    venlafaxine  150 mg Oral Daily    sodium chloride flush  5-40 mL IntraVENous 2 times per day    Arformoterol Tartrate  15 mcg Nebulization BID    And    budesonide  0.5 mg Nebulization BID    carbidopa-levodopa  2 tablet Oral TID     ALPRAZolam, 0.5 mg, Daily PRN  glucose, 15 g, PRN  glucagon (rDNA), 1 mg, PRN  dextrose, 100 mL/hr, PRN  sodium chloride flush, 5-40 mL, PRN  sodium chloride, , PRN  ondansetron, 4 mg, Q8H PRN   Or  ondansetron, 4 mg, Q6H PRN  polyethylene glycol, 17 g, Daily PRN  acetaminophen, 650 mg, Q6H PRN   Or  acetaminophen, 650 mg, Q6H PRN  dextrose bolus (hypoglycemia), 125 mL, PRN   Or  dextrose bolus (hypoglycemia), 250 mL, PRN  albuterol, 2.5 mg, Q4H PRN         Objective:    /79   Pulse 107 Temp 97.7 °F (36.5 °C) (Oral)   Resp 20   Ht 5' (1.524 m)   Wt 232 lb 1.6 oz (105.3 kg)   SpO2 92%   BMI 45.33 kg/m²   General Appearance: alert and oriented to person, place and time and in no acute distress  Skin: warm and dry  Head: normocephalic and atraumatic  Eyes: pupils equal, round, and reactive to light, extraocular eye movements intact, conjunctivae normal  Neck: neck supple and non tender without mass   Pulmonary/Chest: clear to auscultation bilaterally- no wheezes, rales or rhonchi, normal air movement, no respiratory distress  Cardiovascular: tachy  rate, irregular rhythm, normal S1 and S2 and no carotid bruits  Abdomen: soft, non-tender, non-distended, normal bowel sounds, no masses or organomegaly  Extremities: no cyanosis, no clubbing and no edema  Neurologic: no cranial nerve deficit and speech normal      Recent Labs     04/14/22  1557 04/15/22  1000 04/16/22  0255    141 139   K 4.0 4.3 3.7    103 101   CO2 28 29 29   BUN 12 15 16   CREATININE 1.2* 1.3* 1.5*   GLUCOSE 146* 163* 115*   CALCIUM 8.5* 8.7 8.4*       Recent Labs     04/14/22  1557 04/15/22  1000   ALKPHOS 98 110*   PROT 5.8* 6.3*   LABALBU 3.7 3.9   BILITOT 0.6 0.5   AST 7 8   ALT <5 <5       Recent Labs     04/14/22  1557 04/15/22  1000   WBC 4.9 5.8   RBC 3.86 4.20   HGB 10.0* 11.1*   HCT 34.2 37.1   MCV 88.6 88.3   MCH 25.9* 26.4   MCHC 29.2* 29.9*   RDW 16.6* 17.1*    235   MPV 9.8 9.5         Radiology:   Fluoroscopy modified barium swallow with video   Final Result   Thin liquid aspiration when using a straw. Otherwise satisfactory swallowing   mechanism. Please see separate speech pathology report for full discussion of findings   and recommendations. CT Head WO Contrast   Final Result   No acute intracranial abnormality or hemorrhage. CT Cervical Spine WO Contrast   Final Result   No acute abnormality of the cervical spine.          XR CHEST PORTABLE   Final Result   Cardiomegaly HISTORY: fall TECHNOLOGIST PROVIDED HISTORY: Reason for exam:->fall Decision Support Exception - unselect if not a suspected or confirmed emergency medical condition->Emergency Medical Condition (MA) FINDINGS: BONES/ALIGNMENT: There is no acute fracture or traumatic malalignment. There is straightening of normal lordotic curvature likely due to muscular spasm and/or positioning of the neck. DEGENERATIVE CHANGES: No significant degenerative changes. SOFT TISSUES: There is no prevertebral soft tissue swelling. No acute abnormality of the cervical spine. XR CHEST PORTABLE    Result Date: 4/14/2022  EXAMINATION: ONE XRAY VIEW OF THE CHEST PORTABLE UPRIGHT 4/14/2022 3:10 pm COMPARISON: 03/16/2022 HISTORY: ORDERING SYSTEM PROVIDED HISTORY: left rib pain TECHNOLOGIST PROVIDED HISTORY: Reason for exam:->left rib pain FINDINGS: Chest evaluation partly limited by portable technique and large body habitus. Mild cardiomegaly, unchanged. Cephalization of the pulmonary vascularity within the right upper lobe in keeping with mild pulmonary venous congestion. No focal infiltrate or pleural effusion. No pneumothorax. Cardiomegaly and mild pulmonary venous congestion. No pneumonia. Assessment:  Principal Problem:    New onset a-fib ([address]  Active Problems:    Asthma    Hyperlipidemia    Obstructive sleep apnea syndrome    Diabetes mellitus ([address]    Parkinson's disease ([facility]    Primary hypertension    Morbid obesity (HCC)    Acute on chronic congestive heart failure (HCC)    Coronary artery disease involving native coronary artery of native heart without angina pectoris    Dysphagia  Resolved Problems:    * No resolved hospital problems. *      Plan:      1. afib RVR, new onset: Seen by cardiology, given dose IV digoxin (04/15). Toprol XL increased to 25 mg BID, Eliquis 5mg BID  per same. TSH 1.030. Plans for PAT guided cardioversion tomorrow 04/18. 2. SHANTANU: Creatinine trending upward. 1.2 on admission, now 1.5.

## 2022-04-17 NOTE — PROGRESS NOTES
INPATIENT CARDIOLOGY FOLLOW-UP    Name: Vidya Liang    Age: 66 y.o. Date of Admission: 4/14/2022  2:45 PM    Date of Service: 4/17/2022    Primary Cardiologist: Dr. Marie Judge    Chief Complaint: Follow-up for new onset atrial fibrillation. Interim History:  No new overnight cardiac complaints. Currently with no complaints of CP, SOB, palpitations, dizziness, or lightheadedness. In atrial fibrillation with rates in 100s.     Review of Systems:   Negative except as described above    Problem List:  Patient Active Problem List   Diagnosis    Depression with anxiety    Osteoporosis    Asthma    Hyperlipidemia    Mitral regurgitation    Obstructive sleep apnea syndrome    Psoriasis    Diabetes mellitus ([facility] [facility]    Parkinson's disease ([facility] [facility]    Primary hypertension    Microalbuminuria    Morbid obesity ([facility] [address] [facility]    RLS (restless legs syndrome)    Generalized weakness    Inability to walk    Hypothyroidism    Chest pain    Acute asthma exacerbation    Asthma exacerbation, mild    New onset a-fib (HCC)    Atrial fibrillation with RVR (HCC)    Acute on chronic congestive heart failure ([facility] [address] [facility]    Coronary artery disease involving native coronary artery of native heart without angina pectoris    Dysphagia       Current Medications:    Current Facility-Administered Medications:     lidocaine 4 % external patch 1 patch, 1 patch, TransDERmal, Daily, Joyce Smiley MD, 1 patch at 04/17/22 1247    docusate sodium (COLACE) capsule 100 mg, 100 mg, Oral, Daily, RASHARD Love - CNP, 100 mg at 04/17/22 0913    metoprolol succinate (TOPROL XL) extended release tablet 25 mg, 25 mg, Oral, BID, Martina Albrecht, APRN - CNS, 25 mg at 04/17/22 0913    apixaban (ELIQUIS) tablet 5 mg, 5 mg, Oral, BID, Martina Albrecht, APRN - CNS, 5 mg at 04/17/22 0912    ALPRAZolam Jurline [employer]) tablet 0.5 mg, 0.5 mg, Oral, Daily PRN, Lux Lott, RASHARD - CNP, 0.5 mg at 04/16/22 1234    atorvastatin (LIPITOR) tablet 20 mg, 20 mg, Oral, Daily, RASHARD Escobar - CNP, 20 mg at 04/16/22 2105    busPIRone (BUSPAR) tablet 5 mg, 5 mg, Oral, TID, RASHARD Escobar - CNP, 5 mg at 04/17/22 0913    insulin lispro (HUMALOG) injection vial 0-6 Units, 0-6 Units, SubCUTAneous, TID WC, RASHARD Escobar - CNP, 1 Units at 04/17/22 1120    insulin lispro (HUMALOG) injection vial 0-3 Units, 0-3 Units, SubCUTAneous, Nightly, RASHARD Escobar - CNP, 1 Units at 04/16/22 2104    glucose (GLUTOSE) 40 % oral gel 15 g, 15 g, Oral, PRN, RASHARD Escobar CNP    glucagon (rDNA) injection 1 mg, 1 mg, IntraMUSCular, PRN, RASHARD Escobar CNP    dextrose 5 % solution, 100 mL/hr, IntraVENous, PRN, RASHARD Escobar CNP    Vitamin D (CHOLECALCIFEROL) tablet 1,000 Units, 1,000 Units, Oral, Daily, RASHARD Escobar - CNP, 1,000 Units at 04/17/22 0912    doxepin (SINEQUAN) capsule 10 mg, 10 mg, Oral, Nightly, RASHARD Escobar - BRADLEY, 10 mg at 04/16/22 2105    insulin glargine (LANTUS) injection vial 10 Units, 10 Units, SubCUTAneous, Nightly, RASHARD Escobar - CNP, 10 Units at 04/16/22 2104    isosorbide mononitrate (IMDUR) extended release tablet 30 mg, 30 mg, Oral, Daily, RASHARD Escobar - CNP, 30 mg at 04/17/22 0913    lamoTRIgine (LAMICTAL) tablet 200 mg, 200 mg, Oral, Daily, RASHARD Escobar - CNP, 200 mg at 04/17/22 0912    montelukast (SINGULAIR) tablet 10 mg, 10 mg, Oral, Nightly, RASHARD Escobar - CNP, 10 mg at 04/16/22 2105    pantoprazole (PROTONIX) tablet 40 mg, 40 mg, Oral, QAM AC, RASHARD Escobar - CNP, 40 mg at 04/17/22 2923    rOPINIRole (REQUIP) tablet 1 mg, 1 mg, Oral, TID, RASHARD Escobar CNP, 1 mg at 04/17/22 0912    venlafaxine (EFFEXOR XR) extended release capsule 150 mg, 150 mg, Oral, Daily, RASHARD Escobar CNP, 150 mg at 04/17/22 0913    sodium chloride flush 0.9 % injection 5-40 mL, 5-40 mL, IntraVENous, 2 times per day, RASHARD Bach CNP, 10 mL at 04/17/22 0913    sodium chloride flush 0.9 % injection 5-40 mL, 5-40 mL, IntraVENous, PRN, RASHARD Bach CNP    0.9 % sodium chloride infusion, , IntraVENous, PRN, RASHARD Bach CNP    ondansetron (ZOFRAN-ODT) disintegrating tablet 4 mg, 4 mg, Oral, Q8H PRN **OR** ondansetron (ZOFRAN) injection 4 mg, 4 mg, IntraVENous, Q6H PRN, RASHARD Bach CNP, 4 mg at 04/16/22 1206    polyethylene glycol (GLYCOLAX) packet 17 g, 17 g, Oral, Daily PRN, RASHARD Bach CNP    acetaminophen (TYLENOL) tablet 650 mg, 650 mg, Oral, Q6H PRN **OR** acetaminophen (TYLENOL) suppository 650 mg, 650 mg, Rectal, Q6H PRN, RASHARD Bach CNP    dextrose bolus (hypoglycemia) 10% 125 mL, 125 mL, IntraVENous, PRN **OR** dextrose bolus (hypoglycemia) 10% 250 mL, 250 mL, IntraVENous, PRN, RASHARD Bach CNP    albuterol (PROVENTIL) nebulizer solution 2.5 mg, 2.5 mg, Nebulization, Q4H PRN, RASHARD Bach CNP    Arformoterol Tartrate (BROVANA) nebulizer solution 15 mcg, 15 mcg, Nebulization, BID, 15 mcg at 04/17/22 1159 **AND** budesonide (PULMICORT) nebulizer suspension 500 mcg, 0.5 mg, Nebulization, BID, 500 mcg at 04/17/22 1200 **AND** Nebulizer tx intermittent, , , BID, RASHARD Bach CNP    carbidopa-levodopa (SINEMET CR)  MG per extended release tablet 2 tablet, 2 tablet, Oral, TID, RASHARD Bach CNP, 2 tablet at 04/17/22 0913    Physical Exam:  /79   Pulse 107   Temp 97.7 °F (36.5 °C) (Oral)   Resp 20   Ht 5' (1.524 m)   Wt 232 lb 1.6 oz (105.3 kg)   SpO2 93%   BMI 45.33 kg/m²   Wt Readings from Last 3 Encounters:   04/16/22 232 lb 1.6 oz (105.3 kg)   03/26/22 220 lb (99.8 kg)   03/17/22 227 lb 6.4 oz (103.1 kg)     Appearance: Awake, alert, no acute respiratory distress  Skin: Intact, no rash  Head: Normocephalic, atraumatic  Eyes: EOMI, no conjunctival erythema  ENMT: No pharyngeal erythema, MMM, no rhinorrhea  Neck: Supple, no elevated JVP, no carotid bruits  Lungs: Clear to auscultation bilaterally. No wheezes, rales, or rhonchi. Cardiac: PMI nondisplaced, Regular rhythm with a normal rate, S1 & S2 normal, no murmurs  Abdomen: Soft, nontender, +bowel sounds  Extremities: Moves all extremities x 4, no lower extremity edema  Neurologic: No focal motor deficits apparent, normal mood and affect  Peripheral Pulses: Intact posterior tibial pulses bilaterally    Intake/Output:  No intake or output data in the 24 hours ending 04/17/22 1332  No intake/output data recorded.     Laboratory Tests:  Recent Labs     04/15/22  1000 04/16/22  0255 04/17/22  0928    139 140   K 4.3 3.7 4.6    101 101   CO2 29 29 33*   BUN 15 16 17   CREATININE 1.3* 1.5* 1.3*   GLUCOSE 163* 115* 196*   CALCIUM 8.7 8.4* 8.4*     Lab Results   Component Value Date    MG 2.0 04/15/2022     Recent Labs     04/14/22  1557 04/15/22  1000   ALKPHOS 98 110*   ALT <5 <5   AST 7 8   PROT 5.8* 6.3*   BILITOT 0.6 0.5   LABALBU 3.7 3.9     Recent Labs     04/14/22  1557 04/15/22  1000   WBC 4.9 5.8   RBC 3.86 4.20   HGB 10.0* 11.1*   HCT 34.2 37.1   MCV 88.6 88.3   MCH 25.9* 26.4   MCHC 29.2* 29.9*   RDW 16.6* 17.1*    235   MPV 9.8 9.5     Lab Results   Component Value Date    CKTOTAL 32 04/25/2014    CKMB 2.1 04/25/2014    TROPONINI <0.01 10/26/2020    TROPONINI <0.01 08/24/2019    TROPONINI <0.01 05/29/2019     Lab Results   Component Value Date    INR 1.3 04/15/2022    INR 1.2 02/09/2021    INR 1.4 02/04/2021    PROTIME 13.9 (H) 04/15/2022    PROTIME 13.5 (H) 02/09/2021    PROTIME 15.8 (H) 02/04/2021     Lab Results   Component Value Date    TSH 1.030 04/14/2022     Lab Results   Component Value Date    LABA1C 6.9 (H) 03/16/2022     No results found for: EAG  Lab Results   Component Value Date    CHOL 97 04/15/2022    CHOL 146 03/18/2022    CHOL 148 10/25/2021     Lab Results   Component Value Date    TRIG 171 (H) 04/15/2022    TRIG 134 03/18/2022    TRIG 144 10/25/2021     Lab Results   Component Value Date    HDL 46 04/15/2022    HDL 50 03/18/2022    HDL 59 10/25/2021     Lab Results   Component Value Date    LDLCALC 17 04/15/2022    LDLCALC 69 03/18/2022    LDLCALC 60 10/25/2021     Lab Results   Component Value Date    LABVLDL 34 04/15/2022    LABVLDL 27 03/18/2022    LABVLDL 29 10/25/2021    VLDL 84 09/19/2017     Lab Results   Component Value Date    CHOLHDLRATIO 3.3 03/13/2019    CHOLHDLRATIO 3.8 12/11/2018    CHOLHDLRATIO 3.0 09/04/2018     Recent Labs     04/15/22  0325   PROBNP 3,670*       Cardiac Tests:  1. 08/10/11 Lexiscan MPS ([facility]):  Normal Lexiscan portion.  No evidence for inducible ischemia.  No previous myocardial infarction.  Ejection fraction 77%. 2. Lexiscan MPS 04/25/2014: Reversible inferolateral wall perfusion defect. Finding suggests left ventricular myocardium at risk for stress-induced ischemia. EF >70%. 3. TTE 04/27/2014 (Dr. Shine Corbette left ventricle size and systolic function. No wall motion abnormalities. Mild concentric left ventricular hypertrophy. Ejection fraction is visually estimated at >55%. Normal right ventricular size and function. Aortic root is sclerotic and calcified  4. Cardiac Catheterization (Dr. Adair Vaughn) 04/28/2014: Left main: Orin Pappas left main artery is a short vessel which did not appear to have any significant angiographic disease. Left anterior descending:  The left anterior descending artery is a moderate-sized vessel which has minor luminal irregularities in its middle segment but without any significant angiographic luminal narrowing.  The diagonal branches also did not appear to have any significant disease. Left circumflex:  The left circumflex is a large, codominant vessel which did not appear to have any significant angiographic disease.  Right coronary artery:  The right coronary artery is a moderate-sized codominant vessel which did not appear to have any significant angiographic disease. LEFT VENTRICULOGRAPHY:  The left ventricle is normal in size and contractility with an estimated ejection fraction of 60% to 65%.  There was no mitral regurgitation. RIGHT FEMORAL ARTERY ANGIOGRAPHY:  The right common femoral artery and the proximal segments of the right superficial femoral artery and the right profunda did not appear to have any significant disease.   5. TTE 02/07/2021 (Dr. Sri Dahlvel left ventricle size and systolic function. Ejection fraction is visually estimated at 65-70%. No regional wall motion abnormalities seen. Moderate concentric left ventricular hypertrophy. Normal right ventricular size and function. No systolic mitral regurgitation noted. No hemodynamically significant aortic stenosis is present. Unable to estimate PA systolic pressure. No evidence for hemodynamically significant pericardial effusion. Valves were not well visualized, please consider PAT if clinical suspicion for endocarditis is high  6. Lexiscan MPS 01/10/2022: ECG during the infusion did not change. The myocardial perfusion imaging was abnormal. The abnormality was a large sized, moderate fixed defect involving the inferior and inferolateral wall suggestive prior infarct without any reversibility. There was also a small sized mild perfusion defect involving the mid to distal anterior wall suggestive ischemia. Overall left ventricular systolic function was normal without regional wall motion abnormalities. No transient ischemic dilatation. High risk general pharmacologic stress test.  7. Cardiac catheterization 1/11/2022: CONCLUSIONS:  Coronary artery disease: Left main.  No significant disease.   LAD. Valdene Barron calcified proximal and mid vessel with 50% mid vessel stenosis.  60% proximal stenosis of a moderate size first diagonal branch.  LCX.  50% ostial narrowing of the AV groove branch in the mid vessel which is a bifurcation lesion with a large marginal branch which itself did not appear to have any significant disease.  The AV groove branch of the left circumflex continues to provide a posterior descending artery branch and the posterolateral branch (codominant vessel). RCA.  Codominant vessel with no significant angiographic disease. Normal left ventricular size with hyperdynamic left ventricular systolic function with an estimated ejection fraction of 75%. Systemic hypertension.  Elevated left ventricular end-diastolic pressure (18)  8. Echocardiogram 1/11/2022:  Left ventricle is normal in size.  Moderate concentric left ventricular hypertrophy. No regional wall motion abnormalities seen.  Ejection fraction is visually estimated at 70+/-5%.  There is doppler evidence of stage I diastolic dysfunction.  Mildly dilated right ventricle.  Right ventricle global systolic function is normal ( TAPSE = 1.8 ). Normal size atria.  No significant valvular abnormalities noted. Impression:   1. New onset atrial fibrillation, unknown duration. [id number]  2. Mild acute on chronic HFpEF  3. Moderate CAD  4. Morbid obesity  5. Untreated sleep apnea     Recommendations:     · Currently on Toprol-XL 25 mg twice daily. Consider addition of Cardizem 30 mg every 6 if rate still elevated beyond 120 bpm.  If pressures continue to be borderline, can give another one-time dose of digoxin 125 mcg   · Discussed with her about proceeding with a PAT guided cardioversion. Patient is agreeable. We will place orders. She will be n.p.o. at midnight. · Hold losartan, hold Norvasc  · Appears to be euvolemic on examination. · Has been started on Eliquis. Continue aspirin. · Unlikely to remain in sinus rhythm without treatment of sleep apnea and weight loss  · Aggressive risk factor modification      Susana Christy MD, [facility]    NOTE: This report was transcribed using voice recognition software.  Every effort was made to ensure accuracy; however, inadvertent computerized transcription errors may be present.

## 2022-04-17 NOTE — PROGRESS NOTES
Attempted to call patient's son Jaime Mg, left voicemail message to return call to update of status.

## 2022-04-17 NOTE — PROGRESS NOTES
Per Dr. Regan Welch, patient for PAT/cardioversion tomorrow. Clinical manger notified.   Gilford Melena, RN

## 2022-04-17 NOTE — PROGRESS NOTES
Dr. Wellington , on call for Dr. Earl Villagran, and nurse manager notified patient slid out of chair onto the floor.   Cyrus Gonzalez RN

## 2022-04-17 NOTE — ANESTHESIA PRE PROCEDURE
Department of Anesthesiology  Preprocedure Note       Name:  Dash Moore   Age:  66 y.o.  :  1950                                          MRN:  [id number]         Date:  2022      Surgeon: Cardiology    Procedure: PAT +/- DCCV    Medications prior to admission:   Prior to Admission medications    Medication Sig Start Date End Date Taking? Authorizing Provider   budesonide-formoterol (SYMBICORT) 160-4.5 MCG/ACT AERO Inhale 2 puffs into the lungs 2 times daily 3/19/22   Shahram Leon, DO   ipratropium-albuterol (DUONEB) 0.5-2.5 (3) MG/3ML SOLN nebulizer solution Inhale 3 mLs into the lungs every 4 hours as needed for Shortness of Breath 3/19/22   Shital Lugo,    albuterol sulfate HFA (PROVENTIL HFA) 108 (90 Base) MCG/ACT inhaler Inhale 2 puffs into the lungs every 6 hours as needed for Wheezing 3/19/22   Eulalio Lugo DO   rOPINIRole (REQUIP) 1 MG tablet Take 1 tablet by mouth 3 times daily 22   RASHARD Puente CNP   carbidopa-levodopa (SINEMET CR)  MG per extended release tablet Take 1 tablet by mouth 3 times daily 22   RASHARD Puente CNP   isosorbide mononitrate (IMDUR) 30 MG extended release tablet Take 1 tablet by mouth daily  Patient not taking: Reported on 2022   Gokul Sanchez MD   nitroGLYCERIN (NITROSTAT) 0.4 MG SL tablet up to max of 3 total doses. If no relief after 1 dose, call 911.   Patient not taking: Reported on 3/17/2022 1/11/22   Gokul Sanchez MD   busPIRone (BUSPAR) 5 MG tablet Take 1 tablet by mouth 3 times daily  Patient not taking: Reported on 2022   Gokul Sanchez MD   insulin glargine (LANTUS) 100 UNIT/ML injection vial Inject 10 Units into the skin nightly 22   Neftaly Goodman MD   metoprolol succinate (TOPROL XL) 25 MG extended release tablet Take 1 tablet by mouth daily 22   Gokul Sanchez MD   amLODIPine (NORVASC) 10 MG tablet Take 1 tablet by mouth daily  Patient not taking: Reported on 4/14/2022 1/12/22   Brenda Murillo MD   montelukast (SINGULAIR) 10 MG tablet Take 10 mg by mouth nightly    Historical Provider, MD   ALPRAZolam Yuni Armendariz) 0.5 MG tablet Take 0.5 mg by mouth daily as needed for Anxiety. Historical Provider, MD   meclizine (ANTIVERT) 25 MG tablet Take 25 mg by mouth 3 times daily as needed for Dizziness    Historical Provider, MD   Multiple Vitamin (MULTIVITAMIN) TABS tablet Take 1 tablet by mouth daily  Patient not taking: Reported on 3/17/2022 10/26/21   RASHARD Kong CNP   Cholecalciferol (VITAMIN D) 25 MCG TABS Take 1 tablet by mouth daily  Patient not taking: Reported on 4/14/2022 10/26/21   RASHARD Kong CNP   doxepin (SINEQUAN) 10 MG capsule Take 10 mg by mouth nightly    Historical Provider, MD   venlafaxine (EFFEXOR XR) 150 MG extended release capsule Take 150 mg by mouth daily    Historical Provider, MD   losartan (COZAAR) 50 MG tablet Take 50 mg by mouth daily    Historical Provider, MD   omeprazole (PRILOSEC) 40 MG delayed release capsule Take 40 mg by mouth daily     Historical Provider, MD   blood glucose test strips (ACCU-CHEK RIGOBERTO PLUS) strip 1 each by In Vitro route 2 times daily Indications: DISP ACCU-CHEK As needed. Historical Provider, MD   lamoTRIgine (LAMICTAL) 200 MG tablet Take 200 mg by mouth daily  10/18/19   Historical Provider, MD   atorvastatin (LIPITOR) 20 MG tablet Take 1 tablet by mouth daily 10/21/19   Kameron Mishra MD   insulin aspart (NOVOLOG FLEXPEN) 100 UNIT/ML injection pen INJECT 0-10 UNITS INTO THE SKIN THREE TIMES DAILY(BEFORE MEALS) SLIDING SCALE (MAX DAILY 30 UNITS)  Patient taking differently: Indications: med.  approved 6/1/19-7/29/20 INJECT 0-10 UNITS INTO THE SKIN THREE TIMES DAILY(BEFORE MEALS) SLIDING SCALE (MAX DAILY 30 UNITS) 7/29/19   Kameron Mishra MD   aspirin 81 MG tablet Take 81 mg by mouth nightly    Historical Provider, MD       Current medications:    Current Facility-Administered Medications   Medication Dose Route Frequency Provider Last Rate Last Admin    lidocaine 4 % external patch 1 patch  1 patch TransDERmal Daily Will Hudson MD   1 patch at 04/17/22 1247    sodium chloride flush 0.9 % injection 5-40 mL  5-40 mL IntraVENous 2 times per day Naina Tinsley MD        sodium chloride flush 0.9 % injection 5-40 mL  5-40 mL IntraVENous PRN Naina Tinsley MD        0.9 % sodium chloride infusion  25 mL IntraVENous PRN Naina Tinsley MD        docusate sodium (COLACE) capsule 100 mg  100 mg Oral Daily RASHARD Krueger CNP   100 mg at 04/17/22 0913    metoprolol succinate (TOPROL XL) extended release tablet 25 mg  25 mg Oral BID RASHARD Rojas - CNS   25 mg at 04/17/22 0913    apixaban (ELIQUIS) tablet 5 mg  5 mg Oral BID RASHARD Rojas - CNS   5 mg at 04/17/22 0912    ALPRAZolam (XANAX) tablet 0.5 mg  0.5 mg Oral Daily PRN RASHARD Krueger - CNP   0.5 mg at 04/16/22 1234    atorvastatin (LIPITOR) tablet 20 mg  20 mg Oral Daily RASHARD Krueger - CNP   20 mg at 04/16/22 2105    busPIRone (BUSPAR) tablet 5 mg  5 mg Oral TID RASHARD Krueger - CNP   5 mg at 04/17/22 1421    insulin lispro (HUMALOG) injection vial 0-6 Units  0-6 Units SubCUTAneous TID WC RASHARD Krueger CNP   1 Units at 04/17/22 1120    insulin lispro (HUMALOG) injection vial 0-3 Units  0-3 Units SubCUTAneous Nightly RASHARD Krueger - CNP   1 Units at 04/16/22 2104    glucose (GLUTOSE) 40 % oral gel 15 g  15 g Oral PRN RASHARD Krueger CNP        glucagon (rDNA) injection 1 mg  1 mg IntraMUSCular PRN RASHARD Krueger CNP        dextrose 5 % solution  100 mL/hr IntraVENous PRN RASHARD Krueger CNP        Vitamin D (CHOLECALCIFEROL) tablet 1,000 Units  1,000 Units Oral Daily Shani Wolfe, APRN - CNP   1,000 Units at 04/17/22 0912    doxepin (SINEQUAN) capsule 10 mg  10 mg Oral Nightly Nelda Cedeno APRN - CNP   10 mg at 04/16/22 2105    insulin glargine (LANTUS) injection vial 10 Units  10 Units SubCUTAneous Nightly Nelda Gillon, APRN - CNP   10 Units at 04/16/22 2104    isosorbide mononitrate (IMDUR) extended release tablet 30 mg  30 mg Oral Daily Nelda Gillon APRN - CNP   30 mg at 04/17/22 0913    lamoTRIgine (LAMICTAL) tablet 200 mg  200 mg Oral Daily Nelda Cedeno APRN - CNP   200 mg at 04/17/22 0912    montelukast (SINGULAIR) tablet 10 mg  10 mg Oral Nightly Nelda Cedeno APRN - CNP   10 mg at 04/16/22 2105    pantoprazole (PROTONIX) tablet 40 mg  40 mg Oral QAM AC Nelda Cedeno APRN - CNP   40 mg at 04/17/22 7924    rOPINIRole (REQUIP) tablet 1 mg  1 mg Oral TID Nelda Cedeno APRN - CNP   1 mg at 04/17/22 1421    venlafaxine (EFFEXOR XR) extended release capsule 150 mg  150 mg Oral Daily Nelda Cedeno APRN - CNP   150 mg at 04/17/22 0913    sodium chloride flush 0.9 % injection 5-40 mL  5-40 mL IntraVENous 2 times per day Nelda Cedeno APRN - CNP   10 mL at 04/17/22 0913    sodium chloride flush 0.9 % injection 5-40 mL  5-40 mL IntraVENous PRN Nelda Cedeno APRN - CNP        0.9 % sodium chloride infusion   IntraVENous PRN Nelda Cedeno APRN - CNP        ondansetron (ZOFRAN-ODT) disintegrating tablet 4 mg  4 mg Oral Q8H PRN Nelda Cedeno APRN - CNP        Or    [facility] injection 4 mg  4 mg IntraVENous Q6H PRN Nelda Cedeno APRN - CNP   4 mg at 04/16/22 1206    polyethylene glycol (GLYCOLAX) packet 17 g  17 g Oral Daily PRN Nelda Cedeno APRN - CNP        acetaminophen (TYLENOL) tablet 650 mg  650 mg Oral Q6H PRN Nelda Cedeno APRN - CNP        Or    acetaminophen (TYLENOL) suppository 650 mg  650 mg Rectal Q6H PRN Penne Cedeno, APRN - CNP        dextrose bolus (hypoglycemia) 10% 125 mL  125 mL IntraVENous PRN Penne Cedeno, APRN - CNP        Or    dextrose bolus (hypoglycemia) 10% 250 mL  250 mL IntraVENous PRN Charity Cari, APRN - CNP        albuterol (PROVENTIL) nebulizer solution 2.5 mg  2.5 mg Nebulization Q4H PRN Charity Cari, APRN - CNP        Arformoterol Tartrate [facility]) nebulizer solution 15 mcg  15 mcg Nebulization BID Charity Farjacquie, APRN - CNP   15 mcg at 04/17/22 1159    And    budesonide (PULMICORT) nebulizer suspension 500 mcg  0.5 mg Nebulization BID Charity Cari, APRN - CNP   500 mcg at 04/17/22 1200    carbidopa-levodopa (SINEMET CR)  MG per extended release tablet 2 tablet  2 tablet Oral TID Charity Cari, APRN - CNP   2 tablet at 04/17/22 1421       Allergies: Allergies   Allergen Reactions    Ciprofloxacin Nausea And Vomiting    Codeine Itching     Creepy crawly \" feelings       Problem List:    Patient Active Problem List   Diagnosis Code    Depression with anxiety F41.8    Osteoporosis M81.0    Asthma J45.909    Hyperlipidemia E78.5    Mitral regurgitation I34.0    Obstructive sleep apnea syndrome G47.33    Psoriasis L40.9    Diabetes mellitus ([address] E11.9    [facility] G20    Primary hypertension I10    Microalbuminuria R80.9    Morbid obesity ([facility] E66.01    RLS (restless legs syndrome) G25.81    Generalized weakness R53.1    Inability to walk R26.2    Hypothyroidism E03.9    Chest pain R07.9    Acute asthma exacerbation J45. 901    Asthma exacerbation, mild J45. [place] onset a-fib ([facility]) I48.91    Atrial fibrillation with RVR ([facility]) I48.91    Acute on chronic congestive heart failure ([facility]) I50.9    Coronary artery disease involving native coronary artery of native heart without angina pectoris I25.10    Dysphagia R13.10       Past Medical History:        Diagnosis Date    Acute on chronic congestive heart failure ([facility])     Anxiety     Asthma     CAD (coronary artery disease) 01/21/2016    [facility]  breast ca 2006    right lumpectomy    Chronic kidney disease     nephrolithiasis    Depression      04/17/2022    CO2 33 04/17/2022    BUN 17 04/17/2022    CREATININE 1.3 04/17/2022    GFRAA 49 04/17/2022    LABGLOM 40 04/17/2022    GLUCOSE 196 04/17/2022    PROT 6.3 04/15/2022    CALCIUM 8.4 04/17/2022    BILITOT 0.5 04/15/2022    ALKPHOS 110 04/15/2022    AST 8 04/15/2022    ALT <5 04/15/2022       POC Tests: No results for input(s): POCGLU, POCNA, POCK, POCCL, POCBUN, POCHEMO, POCHCT in the last 72 hours.     Coags:   Lab Results   Component Value Date    PROTIME 13.9 04/15/2022    INR 1.3 04/15/2022    APTT 32.2 02/04/2021       HCG (If Applicable): No results found for: PREGTESTUR, PREGSERUM, HCG, HCGQUANT     ABGs: No results found for: PHART, PO2ART, JZO6HRV, APQ9SPM, BEART, R0NFNPGJ     Type & Screen (If Applicable):  No results found for: LABABO, LABRH    Drug/Infectious Status (If Applicable):  No results found for: HIV, HEPCAB    COVID-19 Screening (If Applicable):   Lab Results   Component Value Date    COVID19 Not Detected 03/17/2022           Anesthesia Evaluation  Patient summary reviewed and Nursing notes reviewed no history of anesthetic complications:   Airway: Mallampati: III  TM distance: >3 FB   Neck ROM: full  Mouth opening: > = 3 FB Dental:    (+) upper dentures, lower dentures and edentulous      Pulmonary: breath sounds clear to auscultation  (+) sleep apnea: on CPAP,  asthma:     (-) not a current smoker                           Cardiovascular:  Exercise tolerance: poor (<4 METS),   (+) hypertension: mild, valvular problems/murmurs: MR, CAD: obstructive, dysrhythmias (RVR): atrial fibrillation, CHF (Acute on chronic HFpEF):, hyperlipidemia      ECG reviewed  Rhythm: irregular  Rate: abnormal  Echocardiogram reviewed    Cleared by cardiology     Beta Blocker:  Dose within 24 Hrs      ROS comment: EKG:  Atrial fibrillation with rapid ventricular response  Low voltage QRS  Nonspecific ST abnormality  Abnormal ECG  When compared with ECG of 13-NOV-2021 19:45,  Atrial fibrillation has replaced Sinus rhythm    ECHO:  Left ventricle is normal in size. Moderate concentric left ventricular hypertrophy. No regional wall motion abnormalities seen. Ejection fraction is visually estimated at 70+/-5%. There is doppler evidence of stage I diastolic dysfunction. Mildly dilated right ventricle. Right ventricle global systolic function is normal ( TAPSE = 1.8 ). Normal size atria. No significant valvular abnormalities noted     Neuro/Psych:   (+) neuromuscular disease: Parkinson's disease, psychiatric history:depression/anxiety              ROS comment: Ambulatory dysfunction GI/Hepatic/Renal:   (+) GERD: no interval change, renal disease (Creatinine 1.3 & GFR 40): kidney stones and CRI, morbid obesity (BMI 45.3 kg/m2)         ROS comment: Dysphagia. Endo/Other:    (+) Diabetes (A1C 6.9%)Type II DM, using insulin, hypothyroidism, blood dyscrasia (Hbg 11.1 g/dL; Eliquis): anemia and anticoagulation therapy, arthritis (S/P lumbar laminectomy): OA., malignancy/cancer (Breast s/p right lumpectomy). Pt had no PAT visit        ROS comment: RLS  Psoriasis  Osteoporosis Abdominal:   (+) obese,           Vascular: negative vascular ROS. Other Findings:           Anesthesia Plan      MAC     ASA 4       Induction: intravenous. MIPS: Prophylactic antiemetics administered. Anesthetic plan and risks discussed with patient. Plan discussed with CRNA. Ulysses Alvarez DO   4/17/2022      History, data, and pertinent studies from chart review. Above represents information available via the shared medical record including previous anesthetic, medication, and allergy history. Confirmation of above and final disposition per DOS anesthesiologist.    Ulysses Alvarez DO  Staff Anesthesiologist  April 17, 2022  4:46 PM  DOS STAFF ADDENDUM:    Pt seen and examined, chart reviewed (including anesthesia, drug and allergy history).        Anesthetic plan, risks, benefits, alternatives, and personnel involved discussed with patient. Patient verbalized an understanding and agrees to proceed. Plan discussed with care team members and agreed upon.     Avinash Vizcaino MD  Staff Anesthesiologist  10:20 AM

## 2022-04-18 ENCOUNTER — APPOINTMENT (OUTPATIENT)
Dept: NON INVASIVE DIAGNOSTICS | Age: 72
DRG: 308 | End: 2022-04-18
Payer: MEDICARE

## 2022-04-18 VITALS
OXYGEN SATURATION: 92 % | SYSTOLIC BLOOD PRESSURE: 109 MMHG | RESPIRATION RATE: 21 BRPM | DIASTOLIC BLOOD PRESSURE: 53 MMHG

## 2022-04-18 LAB
ANION GAP SERPL CALCULATED.3IONS-SCNC: 8 MMOL/L (ref 7–16)
BUN BLDV-MCNC: 17 MG/DL (ref 6–23)
CALCIUM SERPL-MCNC: 8.5 MG/DL (ref 8.6–10.2)
CHLORIDE BLD-SCNC: 99 MMOL/L (ref 98–107)
CO2: 29 MMOL/L (ref 22–29)
CREAT SERPL-MCNC: 1.3 MG/DL (ref 0.5–1)
DAT POLYSPECIFIC: NORMAL
EKG ATRIAL RATE: 127 BPM
EKG ATRIAL RATE: 53 BPM
EKG P AXIS: -6 DEGREES
EKG P-R INTERVAL: 178 MS
EKG Q-T INTERVAL: 324 MS
EKG Q-T INTERVAL: 422 MS
EKG QRS DURATION: 90 MS
EKG QRS DURATION: 96 MS
EKG QTC CALCULATION (BAZETT): 395 MS
EKG QTC CALCULATION (BAZETT): 457 MS
EKG R AXIS: 78 DEGREES
EKG R AXIS: 86 DEGREES
EKG T AXIS: -169 DEGREES
EKG T AXIS: 56 DEGREES
EKG VENTRICULAR RATE: 120 BPM
EKG VENTRICULAR RATE: 53 BPM
GFR AFRICAN AMERICAN: 49
GFR NON-AFRICAN AMERICAN: 40 ML/MIN/1.73
GLUCOSE BLD-MCNC: 158 MG/DL (ref 74–99)
HAPTOGLOBIN: 144 MG/DL (ref 30–200)
HCT VFR BLD CALC: 35.4 % (ref 34–48)
HEMOGLOBIN: 10.3 G/DL (ref 11.5–15.5)
LACTATE DEHYDROGENASE: 203 U/L (ref 135–214)
LV EF: 60 %
LVEF MODALITY: NORMAL
MCH RBC QN AUTO: 26 PG (ref 26–35)
MCHC RBC AUTO-ENTMCNC: 29.1 % (ref 32–34.5)
MCV RBC AUTO: 89.4 FL (ref 80–99.9)
METER GLUCOSE: 131 MG/DL (ref 74–99)
METER GLUCOSE: 142 MG/DL (ref 74–99)
METER GLUCOSE: 146 MG/DL (ref 74–99)
METER GLUCOSE: 167 MG/DL (ref 74–99)
MONO TEST: NEGATIVE
PDW BLD-RTO: 17 FL (ref 11.5–15)
PLATELET # BLD: 208 E9/L (ref 130–450)
PMV BLD AUTO: 9.8 FL (ref 7–12)
POTASSIUM SERPL-SCNC: 4.4 MMOL/L (ref 3.5–5)
RBC # BLD: 3.96 E12/L (ref 3.5–5.5)
RHEUMATOID FACTOR: 16 IU/ML (ref 0–13)
SODIUM BLD-SCNC: 136 MMOL/L (ref 132–146)
WBC # BLD: 6.2 E9/L (ref 4.5–11.5)

## 2022-04-18 PROCEDURE — 93312 ECHO TRANSESOPHAGEAL: CPT

## 2022-04-18 PROCEDURE — 82962 GLUCOSE BLOOD TEST: CPT

## 2022-04-18 PROCEDURE — 93010 ELECTROCARDIOGRAM REPORT: CPT | Performed by: INTERNAL MEDICINE

## 2022-04-18 PROCEDURE — B24BZZ4 ULTRASONOGRAPHY OF HEART WITH AORTA, TRANSESOPHAGEAL: ICD-10-PCS | Performed by: INTERNAL MEDICINE

## 2022-04-18 PROCEDURE — 99233 SBSQ HOSP IP/OBS HIGH 50: CPT | Performed by: FAMILY MEDICINE

## 2022-04-18 PROCEDURE — 83516 IMMUNOASSAY NONANTIBODY: CPT

## 2022-04-18 PROCEDURE — 83615 LACTATE (LD) (LDH) ENZYME: CPT

## 2022-04-18 PROCEDURE — 2580000003 HC RX 258: Performed by: FAMILY MEDICINE

## 2022-04-18 PROCEDURE — 6360000002 HC RX W HCPCS

## 2022-04-18 PROCEDURE — 83010 ASSAY OF HAPTOGLOBIN QUANT: CPT

## 2022-04-18 PROCEDURE — 80048 BASIC METABOLIC PNL TOTAL CA: CPT

## 2022-04-18 PROCEDURE — 3700000001 HC ADD 15 MINUTES (ANESTHESIA)

## 2022-04-18 PROCEDURE — 86038 ANTINUCLEAR ANTIBODIES: CPT

## 2022-04-18 PROCEDURE — 6370000000 HC RX 637 (ALT 250 FOR IP): Performed by: FAMILY MEDICINE

## 2022-04-18 PROCEDURE — 36415 COLL VENOUS BLD VENIPUNCTURE: CPT

## 2022-04-18 PROCEDURE — 80074 ACUTE HEPATITIS PANEL: CPT

## 2022-04-18 PROCEDURE — 97535 SELF CARE MNGMENT TRAINING: CPT

## 2022-04-18 PROCEDURE — 5A2204Z RESTORATION OF CARDIAC RHYTHM, SINGLE: ICD-10-PCS | Performed by: INTERNAL MEDICINE

## 2022-04-18 PROCEDURE — 2580000003 HC RX 258

## 2022-04-18 PROCEDURE — 3700000000 HC ANESTHESIA ATTENDED CARE

## 2022-04-18 PROCEDURE — 92960 CARDIOVERSION ELECTRIC EXT: CPT

## 2022-04-18 PROCEDURE — 86255 FLUORESCENT ANTIBODY SCREEN: CPT

## 2022-04-18 PROCEDURE — 6370000000 HC RX 637 (ALT 250 FOR IP): Performed by: CLINICAL NURSE SPECIALIST

## 2022-04-18 PROCEDURE — 93325 DOPPLER ECHO COLOR FLOW MAPG: CPT | Performed by: INTERNAL MEDICINE

## 2022-04-18 PROCEDURE — 86880 COOMBS TEST DIRECT: CPT

## 2022-04-18 PROCEDURE — 93312 ECHO TRANSESOPHAGEAL: CPT | Performed by: INTERNAL MEDICINE

## 2022-04-18 PROCEDURE — 85027 COMPLETE CBC AUTOMATED: CPT

## 2022-04-18 PROCEDURE — 92960 CARDIOVERSION ELECTRIC EXT: CPT | Performed by: INTERNAL MEDICINE

## 2022-04-18 PROCEDURE — 86665 EPSTEIN-BARR CAPSID VCA: CPT

## 2022-04-18 PROCEDURE — 7100000011 HC PHASE II RECOVERY - ADDTL 15 MIN

## 2022-04-18 PROCEDURE — 6370000000 HC RX 637 (ALT 250 FOR IP): Performed by: NURSE PRACTITIONER

## 2022-04-18 PROCEDURE — 2580000003 HC RX 258: Performed by: INTERNAL MEDICINE

## 2022-04-18 PROCEDURE — 93320 DOPPLER ECHO COMPLETE: CPT

## 2022-04-18 PROCEDURE — 97530 THERAPEUTIC ACTIVITIES: CPT

## 2022-04-18 PROCEDURE — 92526 ORAL FUNCTION THERAPY: CPT | Performed by: SPEECH-LANGUAGE PATHOLOGIST

## 2022-04-18 PROCEDURE — 99232 SBSQ HOSP IP/OBS MODERATE 35: CPT | Performed by: INTERNAL MEDICINE

## 2022-04-18 PROCEDURE — 86431 RHEUMATOID FACTOR QUANT: CPT

## 2022-04-18 PROCEDURE — 7100000010 HC PHASE II RECOVERY - FIRST 15 MIN

## 2022-04-18 PROCEDURE — 86308 HETEROPHILE ANTIBODY SCREEN: CPT

## 2022-04-18 PROCEDURE — 82390 ASSAY OF CERULOPLASMIN: CPT

## 2022-04-18 PROCEDURE — 2060000000 HC ICU INTERMEDIATE R&B

## 2022-04-18 PROCEDURE — 93005 ELECTROCARDIOGRAM TRACING: CPT | Performed by: INTERNAL MEDICINE

## 2022-04-18 PROCEDURE — 93325 DOPPLER ECHO COLOR FLOW MAPG: CPT

## 2022-04-18 PROCEDURE — 6360000002 HC RX W HCPCS: Performed by: NURSE PRACTITIONER

## 2022-04-18 PROCEDURE — 93320 DOPPLER ECHO COMPLETE: CPT | Performed by: INTERNAL MEDICINE

## 2022-04-18 PROCEDURE — 94640 AIRWAY INHALATION TREATMENT: CPT

## 2022-04-18 RX ORDER — SODIUM CHLORIDE 9 MG/ML
INJECTION, SOLUTION INTRAVENOUS CONTINUOUS PRN
Status: DISCONTINUED | OUTPATIENT
Start: 2022-04-18 | End: 2022-04-18 | Stop reason: SDUPTHER

## 2022-04-18 RX ORDER — PHENYLEPHRINE HCL IN 0.9% NACL 1 MG/10 ML
SYRINGE (ML) INTRAVENOUS
Status: DISCONTINUED
Start: 2022-04-18 | End: 2022-04-18 | Stop reason: WASHOUT

## 2022-04-18 RX ORDER — SODIUM CHLORIDE 9 MG/ML
INJECTION, SOLUTION INTRAVENOUS CONTINUOUS
Status: DISCONTINUED | OUTPATIENT
Start: 2022-04-18 | End: 2022-04-19

## 2022-04-18 RX ORDER — PROPOFOL 10 MG/ML
INJECTION, EMULSION INTRAVENOUS PRN
Status: DISCONTINUED | OUTPATIENT
Start: 2022-04-18 | End: 2022-04-18 | Stop reason: SDUPTHER

## 2022-04-18 RX ORDER — ATROPINE SULFATE 0.1 MG/ML
INJECTION INTRAVENOUS
Status: DISCONTINUED
Start: 2022-04-18 | End: 2022-04-18 | Stop reason: WASHOUT

## 2022-04-18 RX ADMIN — BUSPIRONE HYDROCHLORIDE 5 MG: 5 TABLET ORAL at 08:30

## 2022-04-18 RX ADMIN — ROPINIROLE HYDROCHLORIDE 1 MG: 1 TABLET, FILM COATED ORAL at 21:27

## 2022-04-18 RX ADMIN — PROPOFOL 140 MG: 10 INJECTION, EMULSION INTRAVENOUS at 10:42

## 2022-04-18 RX ADMIN — APIXABAN 5 MG: 5 TABLET, FILM COATED ORAL at 08:29

## 2022-04-18 RX ADMIN — Medication 10 ML: at 21:28

## 2022-04-18 RX ADMIN — BUDESONIDE 500 MCG: 0.5 SUSPENSION RESPIRATORY (INHALATION) at 08:22

## 2022-04-18 RX ADMIN — ATORVASTATIN CALCIUM 20 MG: 40 TABLET, FILM COATED ORAL at 21:27

## 2022-04-18 RX ADMIN — METOPROLOL SUCCINATE 25 MG: 25 TABLET, FILM COATED, EXTENDED RELEASE ORAL at 21:27

## 2022-04-18 RX ADMIN — APIXABAN 5 MG: 5 TABLET, FILM COATED ORAL at 21:28

## 2022-04-18 RX ADMIN — LAMOTRIGINE 200 MG: 100 TABLET ORAL at 08:30

## 2022-04-18 RX ADMIN — INSULIN GLARGINE 10 UNITS: 100 INJECTION, SOLUTION SUBCUTANEOUS at 21:36

## 2022-04-18 RX ADMIN — ROPINIROLE HYDROCHLORIDE 1 MG: 1 TABLET, FILM COATED ORAL at 08:30

## 2022-04-18 RX ADMIN — PANTOPRAZOLE SODIUM 40 MG: 40 TABLET, DELAYED RELEASE ORAL at 05:25

## 2022-04-18 RX ADMIN — Medication 1000 UNITS: at 08:30

## 2022-04-18 RX ADMIN — INSULIN LISPRO 1 UNITS: 100 INJECTION, SOLUTION INTRAVENOUS; SUBCUTANEOUS at 21:35

## 2022-04-18 RX ADMIN — BUSPIRONE HYDROCHLORIDE 5 MG: 5 TABLET ORAL at 14:14

## 2022-04-18 RX ADMIN — MONTELUKAST SODIUM 10 MG: 10 TABLET, FILM COATED ORAL at 21:27

## 2022-04-18 RX ADMIN — BUDESONIDE 500 MCG: 0.5 SUSPENSION RESPIRATORY (INHALATION) at 19:14

## 2022-04-18 RX ADMIN — SODIUM CHLORIDE: 9 INJECTION, SOLUTION INTRAVENOUS at 10:19

## 2022-04-18 RX ADMIN — ISOSORBIDE MONONITRATE 30 MG: 30 TABLET, EXTENDED RELEASE ORAL at 08:30

## 2022-04-18 RX ADMIN — CARBIDOPA AND LEVODOPA 2 TABLET: 25; 100 TABLET, EXTENDED RELEASE ORAL at 14:14

## 2022-04-18 RX ADMIN — CARBIDOPA AND LEVODOPA 2 TABLET: 25; 100 TABLET, EXTENDED RELEASE ORAL at 21:27

## 2022-04-18 RX ADMIN — ROPINIROLE HYDROCHLORIDE 1 MG: 1 TABLET, FILM COATED ORAL at 14:14

## 2022-04-18 RX ADMIN — BUSPIRONE HYDROCHLORIDE 5 MG: 5 TABLET ORAL at 21:27

## 2022-04-18 RX ADMIN — VENLAFAXINE HYDROCHLORIDE 150 MG: 150 CAPSULE, EXTENDED RELEASE ORAL at 08:29

## 2022-04-18 RX ADMIN — ARFORMOTEROL TARTRATE 15 MCG: 15 SOLUTION RESPIRATORY (INHALATION) at 19:14

## 2022-04-18 RX ADMIN — ARFORMOTEROL TARTRATE 15 MCG: 15 SOLUTION RESPIRATORY (INHALATION) at 08:22

## 2022-04-18 RX ADMIN — CARBIDOPA AND LEVODOPA 2 TABLET: 25; 100 TABLET, EXTENDED RELEASE ORAL at 08:29

## 2022-04-18 RX ADMIN — INSULIN LISPRO 1 UNITS: 100 INJECTION, SOLUTION INTRAVENOUS; SUBCUTANEOUS at 16:45

## 2022-04-18 RX ADMIN — METOPROLOL SUCCINATE 25 MG: 25 TABLET, FILM COATED, EXTENDED RELEASE ORAL at 08:30

## 2022-04-18 RX ADMIN — SODIUM CHLORIDE: 9 INJECTION, SOLUTION INTRAVENOUS at 08:48

## 2022-04-18 RX ADMIN — DOXEPIN HYDROCHLORIDE 10 MG: 10 CAPSULE ORAL at 21:28

## 2022-04-18 RX ADMIN — Medication 10 ML: at 08:30

## 2022-04-18 ASSESSMENT — PAIN - FUNCTIONAL ASSESSMENT: PAIN_FUNCTIONAL_ASSESSMENT: 0-10

## 2022-04-18 ASSESSMENT — PAIN SCALES - GENERAL
PAINLEVEL_OUTOF10: 0
PAINLEVEL_OUTOF10: 0

## 2022-04-18 ASSESSMENT — PAIN SCALES - WONG BAKER: WONGBAKER_NUMERICALRESPONSE: 0

## 2022-04-18 NOTE — PROCEDURES
Preprocedure diagnosis:  New onset atrial fibrillation  Procedures, cardioversion elective arrhythmia external    Preprocedure diagnosis: A. Fib/flutter    Prior tests anticoagulated    Primary procedure  Written informed consent was obtained, the PAT was performed under stable fasting condition, self-adhesive anterior-posterior defibrillation pads were applied, the defibrillator was programmed to deliver energy in a synchronized fashion with a EKG. The patient was set up for monitoring of surface EKG and pulse oximetry continuously. Blood pressure was monitored and with automatic cuff measurements. Supplemental oxygen was administered. The procedure was performed under anesthesia by anesthesiology. After ensuring adequate anesthesia, DCCV was performed by delivering 200 J of direct current delivered in a synchronized fashion. Result: Successful cardioversion to sinus rhythm, confirmed on 12-lead EKG. Complication: None    Patient was monitored until awake and vitals remained stable. Adali Acosta M.D.   [facility] cardiology

## 2022-04-18 NOTE — PROGRESS NOTES
SPEECH LANGUAGE PATHOLOGY  DAILY PROGRESS NOTE        PATIENT NAME:  Nicole Chilel      :  1950          TODAY'S DATE:  2022 ROOM:  0406/0406-A    Patient seen for ongoing dysphagia management tx. Pt was oriented to place and self. SLP oriented patient to date. Pt stated she had difficulty with meals yesterday stating she was coughing and choking. SLP could not administer any PO trials due to pt being NPO for procedure. SLP gave pt swallowing exercises to help strengthen swallowing muscles. Completed effortful swallow x20 and benson x10 exercises with max prompting. SLP educated pt on purpose of exercises and that pt can complete these on her own throughout the day. Pt stated she understood and would attempt to complete exercises on her own. It is recommended at this time pt's diet be Minced and moist consistency solids (IDDSI level 5) with  thin liquids (IDDSI level 0) for safer PO intake. CPT code(s) 79541  dysphagia tx  Total minutes :  15 minutes    Niayh DE LA ROSA   Speech Language Pathology

## 2022-04-18 NOTE — PROGRESS NOTES
INPATIENT CARDIOLOGY FOLLOW-UP    Name: Joao Cano    Age: 66 y.o. Date of Admission: 4/14/2022  2:45 PM    Date of Service: 4/18/2022    Chief Complaint: Follow-up  for new onset atrial fibrillation. Interim History:  No new overnight cardiac complaints except for dyspnea with exertion. . Currently with no complaints of CP, palpitations, dizziness, or lightheadedness. AF on telemetry. Review of Systems:   Cardiac: As per HPI  General: No fever, chills  Pulmonary: As per HPI  HEENT: No visual disturbances, difficult swallowing  GI: No nausea, vomiting  Endocrine: No thyroid disease or DM  Musculoskeletal: HDZ x 4, no focal motor deficits  Skin: Intact, no rashes  Neuro/Psych: No headache or seizures    Problem List:  Patient Active Problem List   Diagnosis    Depression with anxiety    Osteoporosis    Asthma    Hyperlipidemia    Mitral regurgitation    Obstructive sleep apnea syndrome    Psoriasis    Diabetes mellitus ([address]    Parkinson's disease ([address]    Primary hypertension    Microalbuminuria    Morbid obesity ([address]    RLS (restless legs syndrome)    Generalized weakness    Inability to walk    Hypothyroidism    Chest pain    Acute asthma exacerbation    Asthma exacerbation, mild    New onset a-fib (HCC)    Atrial fibrillation with RVR (HCC)    Acute on chronic congestive heart failure ([address]    Coronary artery disease involving native coronary artery of native heart without angina pectoris    Dysphagia       Allergies:   Allergies   Allergen Reactions    Ciprofloxacin Nausea And Vomiting    Codeine Itching     Creepy crawly \" feelings       Current Medications:  Current Facility-Administered Medications   Medication Dose Route Frequency Provider Last Rate Last Admin    0.9 % sodium chloride infusion   IntraVENous Continuous Devi Nunez MD 75 mL/hr at 04/18/22 0848 New Bag at 04/18/22 0848    phenylephrine (JIMENA-SYNEPHRINE) 1 MG/10ML prefilled syringe             atropine 1 MG/10ML injection             lidocaine 4 % external patch 1 patch  1 patch TransDERmal Daily Phuc Daly MD   1 patch at 04/17/22 1247    sodium chloride flush 0.9 % injection 5-40 mL  5-40 mL IntraVENous 2 times per day Gia Dos Santos MD   10 mL at 04/18/22 0830    sodium chloride flush 0.9 % injection 5-40 mL  5-40 mL IntraVENous PRN Gia Dos Santos MD        0.9 % sodium chloride infusion  25 mL IntraVENous PRN Gia Dos Santos MD        docusate sodium (COLACE) capsule 100 mg  100 mg Oral Daily Rivera Holly APRN - CNP   100 mg at 04/17/22 0913    metoprolol succinate (TOPROL XL) extended release tablet 25 mg  25 mg Oral BID Rubye Kawasaki, APRN - CNS   25 mg at 04/18/22 0830    apixaban (ELIQUIS) tablet 5 mg  5 mg Oral BID Rubye Kawasaki, APRN - CNS   5 mg at 04/18/22 7914    ALPRAZolam (XANAX) tablet 0.5 mg  0.5 mg Oral Daily PRN Rivera Holly APRN - CNP   0.5 mg at 04/16/22 1234    atorvastatin (LIPITOR) tablet 20 mg  20 mg Oral Daily Rivera Holly, APRN - CNP   20 mg at 04/17/22 2045    busPIRone (BUSPAR) tablet 5 mg  5 mg Oral TID Rivera Holly APRN - CNP   5 mg at 04/18/22 0830    insulin lispro (HUMALOG) injection vial 0-6 Units  0-6 Units SubCUTAneous TID WC Rivear Holly APRN - CNP   1 Units at 04/17/22 1120    insulin lispro (HUMALOG) injection vial 0-3 Units  0-3 Units SubCUTAneous Nightly Rivera Holly, APRN - CNP   1 Units at 04/16/22 2104    glucose (GLUTOSE) 40 % oral gel 15 g  15 g Oral PRN Rivera Holly APRN - CNP        glucagon (rDNA) injection 1 mg  1 mg IntraMUSCular PRN Rivera Holly APRN - CNP        dextrose 5 % solution  100 mL/hr IntraVENous PRN Rivera Holly APRN - CNP        Vitamin D (CHOLECALCIFEROL) tablet 1,000 Units  1,000 Units Oral Daily RASHARD Villalba CNP   1,000 Units at 04/18/22 0830    doxepin (SINEQUAN) capsule 10 mg  10 mg Oral Nightly RASHARD Villalba CNP   10 mg at 04/17/22 2046    insulin glargine (LANTUS) injection vial 10 Units  10 Units SubCUTAneous Nightly RASHARD Hester CNP   10 Units at 04/17/22 2129    isosorbide mononitrate (IMDUR) extended release tablet 30 mg  30 mg Oral Daily RASHARD Hester - CNP   30 mg at 04/18/22 0830    lamoTRIgine (LAMICTAL) tablet 200 mg  200 mg Oral Daily RASHARD Hester CNP   200 mg at 04/18/22 0830    montelukast (SINGULAIR) tablet 10 mg  10 mg Oral Nightly RASHARD Hester - CNP   10 mg at 04/17/22 2045    pantoprazole (PROTONIX) tablet 40 mg  40 mg Oral QAM AC RASHARD Hester CNP   40 mg at 04/18/22 0525    rOPINIRole (REQUIP) tablet 1 mg  1 mg Oral TID RASHARD Hestre - CNP   1 mg at 04/18/22 0830    venlafaxine (EFFEXOR XR) extended release capsule 150 mg  150 mg Oral Daily RASHARD Hester - CNP   150 mg at 04/18/22 4165    sodium chloride flush 0.9 % injection 5-40 mL  5-40 mL IntraVENous 2 times per day RASHARD Hester CNP   10 mL at 04/17/22 2051    sodium chloride flush 0.9 % injection 5-40 mL  5-40 mL IntraVENous PRN RASHARD Hester - CNP        0.9 % sodium chloride infusion   IntraVENous PRN RASHARD Hester CNP        ondansetron (ZOFRAN-ODT) disintegrating tablet 4 mg  4 mg Oral Q8H PRN RASHARD Hester CNP        Or    ondansetron [facility]) injection 4 mg  4 mg IntraVENous Q6H PRN RASHARD Hester CNP   4 mg at 04/16/22 1206    polyethylene glycol (GLYCOLAX) packet 17 g  17 g Oral Daily PRN RASHARD Hester CNP        acetaminophen (TYLENOL) tablet 650 mg  650 mg Oral Q6H PRN RASHARD Hester - CNP        Or    acetaminophen (TYLENOL) suppository 650 mg  650 mg Rectal Q6H PRN RASHARD Hester CNP        dextrose bolus (hypoglycemia) 10% 125 mL  125 mL IntraVENous PRN RASHARD Hester CNP        Or    dextrose bolus (hypoglycemia) 10% 250 mL  250 mL IntraVENous PRN RASHARD Hester CNP        albuterol (PROVENTIL) nebulizer solution 2.5 mg  2.5 mg Nebulization Q4H PRN Montero Wisam, APRN - CNP        Arformoterol Tartrate  - [facility]) nebulizer solution 15 mcg  15 mcg Nebulization BID Montero Wisam, APRN - CNP   15 mcg at 04/18/22 5604    And    budesonide (PULMICORT) nebulizer suspension 500 mcg  0.5 mg Nebulization BID Montero Wisam, APRN - CNP   500 mcg at 04/18/22 5952    carbidopa-levodopa (SINEMET CR)  MG per extended release tablet 2 tablet  2 tablet Oral TID Montero Wisam, APRN - CNP   2 tablet at 04/18/22 8643     Facility-Administered Medications Ordered in Other Encounters   Medication Dose Route Frequency Provider Last Rate Last Admin    0.9 % sodium chloride infusion   IntraVENous Continuous PRN Estrella Valverde APRN - CRNA   New Bag at 04/18/22 1019      sodium chloride 75 mL/hr at 04/18/22 0848    sodium chloride      dextrose      sodium chloride         Physical Exam:  BP (!) 142/88   Pulse 101   Temp 97.8 °F (36.6 °C) (Temporal)   Resp 18   Ht 5' (1.524 m)   Wt 244 lb 6.4 oz (110.9 kg)   SpO2 (!) 89%   BMI 47.73 kg/m²   Wt Readings from Last 3 Encounters:   04/18/22 244 lb 6.4 oz (110.9 kg)   03/26/22 220 lb (99.8 kg)   03/17/22 227 lb 6.4 oz (103.1 kg)     Appearance: Awake, alert, no acute respiratory distress  Skin: Intact, no rash  Head: Normocephalic, atraumatic  Eyes: EOMI, no conjunctival erythema  ENMT: No pharyngeal erythema, MMM, no rhinorrhea  Neck: Supple, no elevated JVP, no carotid bruits  Lungs: Clear to auscultation bilaterally. No wheezes, rales, or rhonchi.   Cardiac: Regular rate and rhythm, +S1S2, no murmurs apparent  Abdomen: Soft, nontender, +bowel sounds  Extremities: Moves all extremities x 4, no lower extremity edema  Neurologic: No focal motor deficits apparent, normal mood and affect  Peripheral Pulses: Intact posterior tibial pulses bilaterally    Intake/Output:    Intake/Output Summary (Last 24 hours) at 4/18/2022 1056  Last data filed at 4/18/2022 0016  Gross per 24 hour   Intake --   Output 500 ml   Net -500 ml     No intake/output data recorded. Laboratory Tests:  Recent Labs     04/16/22  0255 04/17/22  0928 04/18/22  0326    140 136   K 3.7 4.6 4.4    101 99   CO2 29 33* 29   BUN 16 17 17   CREATININE 1.5* 1.3* 1.3*   GLUCOSE 115* 196* 158*   CALCIUM 8.4* 8.4* 8.5*     Lab Results   Component Value Date    MG 2.0 04/15/2022     No results for input(s): ALKPHOS, ALT, AST, PROT, BILITOT, BILIDIR, LABALBU in the last 72 hours.   Recent Labs     04/18/22 0326   WBC 6.2   RBC 3.96   HGB 10.3*   HCT 35.4   MCV 89.4   MCH 26.0   MCHC 29.1*   RDW 17.0*      MPV 9.8     Lab Results   Component Value Date    CKTOTAL 32 04/25/2014    CKMB 2.1 04/25/2014    TROPONINI <0.01 10/26/2020    TROPONINI <0.01 08/24/2019    TROPONINI <0.01 05/29/2019     Lab Results   Component Value Date    INR 1.3 04/15/2022    INR 1.2 02/09/2021    INR 1.4 02/04/2021    PROTIME 13.9 (H) 04/15/2022    PROTIME 13.5 (H) 02/09/2021    PROTIME 15.8 (H) 02/04/2021     Lab Results   Component Value Date    TSH 1.030 04/14/2022     Lab Results   Component Value Date    LABA1C 6.9 (H) 03/16/2022     No results found for: EAG  Lab Results   Component Value Date    CHOL 97 04/15/2022    CHOL 146 03/18/2022    CHOL 148 10/25/2021     Lab Results   Component Value Date    TRIG 171 (H) 04/15/2022    TRIG 134 03/18/2022    TRIG 144 10/25/2021     Lab Results   Component Value Date    HDL 46 04/15/2022    HDL 50 03/18/2022    HDL 59 10/25/2021     Lab Results   Component Value Date    LDLCALC 17 04/15/2022    LDLCALC 69 03/18/2022    LDLCALC 60 10/25/2021     Lab Results   Component Value Date    LABVLDL 34 04/15/2022    LABVLDL 27 03/18/2022    LABVLDL 29 10/25/2021    VLDL 84 09/19/2017     Lab Results   Component Value Date    CHOLHDLRATIO 3.3 03/13/2019    CHOLHDLRATIO 3.8 12/11/2018    CHOLHDLRATIO 3.0 09/04/2018       Cardiac Tests:    Telemetry findings reviewed: AF with heart rate in the 110s    1. C08/10/11 Lexiscan MPS ([facility]):  Normal Lexiscan portion.  No evidence for inducible ischemia.  No previous myocardial infarction.  Ejection fraction 77%. 2. Lexiscan MPS 04/25/2014: Reversible inferolateral wall perfusion defect. Finding suggests left ventricular myocardium at risk for stress-induced ischemia. EF >70%. 3. TTE 04/27/2014 (Dr. Lucia Rose left ventricle size and systolic function. No wall motion abnormalities. Mild concentric left ventricular hypertrophy. Ejection fraction is visually estimated at >55%. Normal right ventricular size and function. Aortic root is sclerotic and calcified  4. Cardiac Catheterization (Dr. Mian Amos) 04/28/2014: Left main: Ranny Pepper left main artery is a short vessel which did not appear to have any significant angiographic disease. Left anterior descending:  The left anterior descending artery is a moderate-sized vessel which has minor luminal irregularities in its middle segment but without any significant angiographic luminal narrowing.  The diagonal branches also did not appear to have any significant disease. Left circumflex:  The left circumflex is a large, codominant vessel which did not appear to have any significant angiographic disease. Right coronary artery:  The right coronary artery is a moderate-sized codominant vessel which did not appear to have any significant angiographic disease. LEFT VENTRICULOGRAPHY:  The left ventricle is normal in size and contractility with an estimated ejection fraction of 60% to 65%.  There was no mitral regurgitation. RIGHT FEMORAL ARTERY ANGIOGRAPHY:  The right common femoral artery and the proximal segments of the right superficial femoral artery and the right profunda did not appear to have any significant disease.   5. TTE 02/07/2021 (Dr. Carey Anand left ventricle size and systolic function. Ejection fraction is visually estimated at 65-70%.  No regional wall motion abnormalities seen. Moderate concentric left ventricular hypertrophy. Normal right ventricular size and function. No systolic mitral regurgitation noted. No hemodynamically significant aortic stenosis is present. Unable to estimate PA systolic pressure. No evidence for hemodynamically significant pericardial effusion. Valves were not well visualized, please consider PAT if clinical suspicion for endocarditis is high  6. Lexiscan MPS 01/10/2022: ECG during the infusion did not change. The myocardial perfusion imaging was abnormal. The abnormality was a large sized, moderate fixed defect involving the inferior and inferolateral wall suggestive prior infarct without any reversibility. There was also a small sized mild perfusion defect involving the mid to distal anterior wall suggestive ischemia. Overall left ventricular systolic function was normal without regional wall motion abnormalities. No transient ischemic dilatation. High risk general pharmacologic stress test.  7. Cardiac catheterization 1/11/2022: CONCLUSIONS:  Coronary artery disease: Left main.  No significant disease. LAD. Bennye Leung calcified proximal and mid vessel with 50% mid vessel stenosis.  60% proximal stenosis of a moderate size first diagonal branch.  LCX.  50% ostial narrowing of the AV groove branch in the mid vessel which is a bifurcation lesion with a large marginal branch which itself did not appear to have any significant disease.  The AV groove branch of the left circumflex continues to provide a posterior descending artery branch and the posterolateral branch (codominant vessel). RCA.  Codominant vessel with no significant angiographic disease. Normal left ventricular size with hyperdynamic left ventricular systolic function with an estimated ejection fraction of 75%.  Systemic hypertension.  Elevated left ventricular end-diastolic [id number] (61)  8.   Echocardiogram 1/11/2022:  Left ventricle is normal in size.  Moderate concentric left ventricular hypertrophy. No regional wall motion abnormalities seen.  Ejection fraction is visually estimated at 70+/-5%.  There is doppler evidence of stage I diastolic dysfunction.  Mildly dilated right ventricle.  Right ventricle global systolic function is normal ( TAPSE = 1.8 ). Normal size atria.  No significant valvular abnormalities noted. ASSESSMENT:  Impression:   · New onset atrial fibrillation, unknown duration status post PAT guided cardioversion to normal sinus rhythm.    · [id number] 5 on Eliquis for anticoagulation  · Mild acute on chronic HFpEF, improved  · Moderate CAD  · Morbid obesity  · Untreated sleep apnea  · Hemodynamically stable blood pressure 122/61  · Mild anemia hemoglobin 10.3    Plan:   · Continue Toprol-XL 25 mg. Twice daily and Eliquis 5 mg p.o. twice daily for anticoagulation  · Sleep study as an outpatient  · Continue statin therapy with atorvastatin 20 mg p.o. daily  · Rest as per primary service  · She is stable from the cardiology standpoint, follow up with Dr. Noel Elaine in one month     Harish Jacobson MD., Myriam Olivia.   [facility]) Cardiology

## 2022-04-18 NOTE — CONSULTS
Gastroenterology Consult Note   RASHARD Davenport NP-C with Lv Joshi M.D. Consult Note        Date of Service: 4/18/2022  Reason for Consult: hepatosplenomegaly  Requesting Physician: Dr. Darell Richmond:  Dizziness and fall    History Obtained From:  Patient, EMR    HISTORY OF PRESENT ILLNESS:       Sandhya Armstrong is a 66 y.o. female with significant past medical history of hyperlipidemia, diabetes, anxiety, asthma, CAD and CKD and Parkinson's disease admitted via ED for dizziness and fall. Patient was recently admitted to [facility] for asthma exacerbation and discharged in stable condition on 3/19/22. Patient admitted on 4/14/22 with complaints of dizziness and fall, found to be in A  Fib RVR and CXR showing vascular congestion. She was treated with lasix and lopressor and admitted for evaluation. Pt had recent heart catheterization in January 2022. Patient had complaints of right sided abdominal pain, CT obtained to rule out retroperitoneal bleed, pt on Eliquis, found to have hepatosplenomegaly therefor our service consulted. Pt reports right-sided abdominal/back pain secondary to fall at home and weakness. Denies GI complaints no complaints of nausea, vomiting, melena or hematochezia. No unintentional weight loss. Tolerating diet. However reports a bout of emesis couple days ago postprandial.  Tolerating diet since. No history of liver disease. No sick contacts. No history of tobacco, alcohol or drug use. No prior endoscopic work-up. Denies family history of colon or other GI cancers. Admission labs alk phos 98, ALT less than 5, AST 7, bilirubin 0.6, hemoglobin 10. CT abdomen pelvis with IV contrast- There is no retroperitoneal hematoma. Infiltrates and pleural effusion lung bases likely CHF and or pneumonia. Anasarca with inflammatory changes in the right lateral abdominal wall. Consultation for hepatosplenomegaly. Currently, pt reports no abdominal pain, nausea or vomiting.   Just back to room from PAT.   Labs today hemoglobin 10.3, creatinine 1.3    Past Medical History:        Diagnosis Date    Acute on chronic congestive heart failure (HCC)     Anxiety     Asthma     CAD (coronary artery disease) 01/21/2016    Cancer ([facility]  breast ca 2006    right lumpectomy    Chronic kidney disease     nephrolithiasis    Depression     [facility]     H/O mammogram     Hx MRSA infection     toe infection january 2012    Hyperlipidemia     Hypertension     Lateral epicondylitis     SERENA on CPAP     Parkinson's disease ([facility]     Tubal ligation status      Past Surgical History:        Procedure Laterality Date    BREAST LUMPECTOMY      BREAST REDUCTION SURGERY      CARDIAC CATHETERIZATION  4/28/2014    Dr. Ruy Voss  01/11/2022    Dr. Charmayne Roles  7/29/15    ENDOSCOPY, COLON, DIAGNOSTIC  7/19/15    GALLBLADDER SURGERY      LUMBAR LAMINECTOMY      TOE AMPUTATION      TONSILLECTOMY      UPPER GASTROINTESTINAL ENDOSCOPY       Current Medications:    Current Facility-Administered Medications: 0.9 % sodium chloride infusion, , IntraVENous, Continuous  phenylephrine (JIMENA-SYNEPHRINE) 1 MG/10ML prefilled syringe, , ,   atropine 1 MG/10ML injection, , ,   lidocaine 4 % external patch 1 patch, 1 patch, TransDERmal, Daily  sodium chloride flush 0.9 % injection 5-40 mL, 5-40 mL, IntraVENous, 2 times per day  sodium chloride flush 0.9 % injection 5-40 mL, 5-40 mL, IntraVENous, PRN  0.9 % sodium chloride infusion, 25 mL, IntraVENous, PRN  docusate sodium (COLACE) capsule 100 mg, 100 mg, Oral, Daily  metoprolol succinate (TOPROL XL) extended release tablet 25 mg, 25 mg, Oral, BID  apixaban (ELIQUIS) tablet 5 mg, 5 mg, Oral, BID  ALPRAZolam (XANAX) tablet 0.5 mg, 0.5 mg, Oral, Daily PRN  atorvastatin (LIPITOR) tablet 20 mg, 20 mg, Oral, Daily  busPIRone (BUSPAR) tablet 5 mg, 5 mg, Oral, TID  insulin lispro (HUMALOG) injection vial 0-6 Units, 0-6 Units, SubCUTAneous, TID WC  insulin lispro (HUMALOG) injection vial 0-3 Units, 0-3 Units, SubCUTAneous, Nightly  glucose (GLUTOSE) 40 % oral gel 15 g, 15 g, Oral, PRN  glucagon (rDNA) injection 1 mg, 1 mg, IntraMUSCular, PRN  dextrose 5 % solution, 100 mL/hr, IntraVENous, PRN  Vitamin D (CHOLECALCIFEROL) tablet 1,000 Units, 1,000 Units, Oral, Daily  doxepin (SINEQUAN) capsule 10 mg, 10 mg, Oral, Nightly  insulin glargine (LANTUS) injection vial 10 Units, 10 Units, SubCUTAneous, Nightly  isosorbide mononitrate (IMDUR) extended release tablet 30 mg, 30 mg, Oral, Daily  lamoTRIgine (LAMICTAL) tablet 200 mg, 200 mg, Oral, Daily  montelukast (SINGULAIR) tablet 10 mg, 10 mg, Oral, Nightly  pantoprazole (PROTONIX) tablet 40 mg, 40 mg, Oral, QAM AC  rOPINIRole (REQUIP) tablet 1 mg, 1 mg, Oral, TID  venlafaxine (EFFEXOR XR) extended release capsule 150 mg, 150 mg, Oral, Daily  sodium chloride flush 0.9 % injection 5-40 mL, 5-40 mL, IntraVENous, 2 times per day  sodium chloride flush 0.9 % injection 5-40 mL, 5-40 mL, IntraVENous, PRN  0.9 % sodium chloride infusion, , IntraVENous, PRN  ondansetron (ZOFRAN-ODT) disintegrating tablet 4 mg, 4 mg, Oral, Q8H PRN **OR** ondansetron (ZOFRAN) injection 4 mg, 4 mg, IntraVENous, Q6H PRN  polyethylene glycol (GLYCOLAX) packet 17 g, 17 g, Oral, Daily PRN  acetaminophen (TYLENOL) tablet 650 mg, 650 mg, Oral, Q6H PRN **OR** acetaminophen (TYLENOL) suppository 650 mg, 650 mg, Rectal, Q6H PRN  dextrose bolus (hypoglycemia) 10% 125 mL, 125 mL, IntraVENous, PRN **OR** dextrose bolus (hypoglycemia) 10% 250 mL, 250 mL, IntraVENous, PRN  albuterol (PROVENTIL) nebulizer solution 2.5 mg, 2.5 mg, Nebulization, Q4H PRN  Arformoterol Tartrate (BROVANA) nebulizer solution 15 mcg, 15 mcg, Nebulization, BID **AND** budesonide (PULMICORT) nebulizer suspension 500 mcg, 0.5 mg, Nebulization, BID **AND** Nebulizer tx intermittent, , , BID  carbidopa-levodopa (SINEMET CR)  MG per extended release tablet 2 tablet, 2 tablet, Oral, TID  Facility-Administered Medications Ordered in Other Encounters: 0.9 % sodium chloride infusion, , IntraVENous, Continuous PRN    Allergies:  Ciprofloxacin and Codeine    Social History:    Tobacco:  Pt reports no history or current use   alcohol:  Pt reports no history or current use  Illicit Drugs: Pt reports no history or current use    Family History:   Denies family history of colon or other GI cancers    REVIEW OF SYSTEMS:    Aside from what was mentioned in the PMH and HPI, essentially unremarkable, all others negative.     PHYSICAL EXAM:      Vitals:    BP (!) 142/88   Pulse 101   Temp 97.8 °F (36.6 °C) (Temporal)   Resp 18   Ht 5' (1.524 m)   Wt 244 lb 6.4 oz (110.9 kg)   SpO2 (!) 89%   BMI 47.73 kg/m²       CONSTITUTIONAL:  awake, alert, cooperative, no apparent distress, and appears stated age  EYES:  pupils equal, round and reactive to light, sclera anicteric and conjunctiva normal  ENT:  normocephalic, oral pharynx with moist mucous membranes  NECK:  supple   HEMATOLOGIC/LYMPHATICS:  no cervical lymphadenopathy and no supraclavicular lymphadenopathy  LUNGS:  No increased work of breathing, good air exchange, clear to auscultation bilaterally, on 2LO2 NC  CARDIOVASCULAR:  Normal apical impulse, regular rate and rhythm, no murmur noted; 2+ pulses; no edema  ABDOMEN:  normal bowel sounds, soft, non-distended, non-tender, no masses palpated, no hepatosplenomegally; however difficult to discern patient grossly obese   MUSCULOSKELETAL:  full range of motion noted  motor strength is 5 out of 5 all extremities bilaterally  NEUROLOGIC:  Mental Status Exam:  Level of Alertness:   awake  Orientation:   person, place, time  Motor Exam:  Motor exam is symmetrical 5 out of 5 all extremities bilaterally  SKIN:  normal skin color, texture, turgor    DATA:    CBC with Differential:    Lab Results   Component Value Date    WBC 6.2 04/18/2022    RBC 3.96 04/18/2022    HGB 10.3 04/18/2022    HCT 35.4 04/18/2022     04/18/2022    MCV 89.4 04/18/2022    MCH 26.0 04/18/2022    MCHC 29.1 04/18/2022    RDW 17.0 04/18/2022    NRBC 0.0 03/15/2019    SEGSPCT 74 08/20/2013    LYMPHOPCT 21.4 04/15/2022    MONOPCT 5.4 04/15/2022    MYELOPCT 0.9 03/15/2019    EOSPCT 1 06/16/2017    BASOPCT 0.7 04/15/2022    ATYLYMREL 0.9 03/15/2019    MONOSABS 0.31 04/15/2022    LYMPHSABS 1.24 04/15/2022    EOSABS 0.11 04/15/2022    BASOSABS 0.04 04/15/2022     CMP:    Lab Results   Component Value Date     04/18/2022    K 4.4 04/18/2022    K 4.0 04/14/2022    CL 99 04/18/2022    CO2 29 04/18/2022    BUN 17 04/18/2022    CREATININE 1.3 04/18/2022    GFRAA 49 04/18/2022    LABGLOM 40 04/18/2022    GLUCOSE 158 04/18/2022    PROT 6.3 04/15/2022    LABALBU 3.9 04/15/2022    CALCIUM 8.5 04/18/2022    BILITOT 0.5 04/15/2022    ALKPHOS 110 04/15/2022    AST 8 04/15/2022    ALT <5 04/15/2022     Hepatic Function Panel:    Lab Results   Component Value Date    ALKPHOS 110 04/15/2022    ALT <5 04/15/2022    AST 8 04/15/2022    PROT 6.3 04/15/2022    BILITOT 0.5 04/15/2022    BILIDIR 0.2 07/20/2013    LABALBU 3.9 04/15/2022     PT/INR:    Lab Results   Component Value Date    PROTIME 13.9 04/15/2022    INR 1.3 04/15/2022     PTT:    Lab Results   Component Value Date    APTT 32.2 02/04/2021   [APTT}  Last 3 Troponin:    Lab Results   Component Value Date    TROPONINI <0.01 10/26/2020    TROPONINI <0.01 08/24/2019    TROPONINI <0.01 05/29/2019     TSH:    Lab Results   Component Value Date    TSH 1.030 04/14/2022     VITAMIN B12:   Lab Results   Component Value Date    SEPXSYKG79 783 10/24/2021     FOLATE:    Lab Results   Component Value Date    FOLATE 18.0 10/24/2021     IRON:  No results found for: IRON  Iron Saturation:  No results found for: LABIRON  TIBC:  No results found for: TIBC  FERRITIN:  No results found for: FERRITIN  HIV:  No results found for: HIV  CARLITOS:  No results found for: ANATITER, CARLITOS  No components found for: CHLPL  Lab Results   Component Value Date    TRIG 171 (H) 04/15/2022    TRIG 134 03/18/2022    TRIG 144 10/25/2021     Lab Results   Component Value Date    HDL 46 04/15/2022    HDL 50 03/18/2022    HDL 59 10/25/2021     Lab Results   Component Value Date    LDLCALC 17 04/15/2022    LDLCALC 69 03/18/2022    LDLCALC 60 10/25/2021     Lab Results   Component Value Date    LABVLDL 34 04/15/2022    LABVLDL 27 03/18/2022    LABVLDL 29 10/25/2021        XR KNEE RIGHT (MIN 4 VIEWS)    Result Date: 3/27/2022  EXAMINATION: FOUR XRAY VIEWS OF THE RIGHT KNEE 3/27/2022 1:15 am COMPARISON: 03/07/2022 HISTORY: ORDERING SYSTEM PROVIDED HISTORY: pain TECHNOLOGIST PROVIDED HISTORY: Reason for exam:->pain What reading provider will be dictating this exam?->CRC FINDINGS: No evidence of acute fracture or dislocation. No focal osseous lesion. No evidence of joint effusion. No focal soft tissue abnormality. Narrowing and degenerative changes of the medial compartment. Degenerative changes patellofemoral compartment. No acute abnormality of the knee. Medial and patellofemoral compartment degenerative changes. XR ANKLE RIGHT (MIN 3 VIEWS)    Result Date: 3/26/2022  EXAMINATION: THREE XRAY VIEWS OF THE RIGHT ANKLE 3/26/2022 11:40 pm COMPARISON: None. HISTORY: ORDERING SYSTEM PROVIDED HISTORY: fall, pain TECHNOLOGIST PROVIDED HISTORY: Reason for exam:->fall, pain What reading provider will be dictating this exam?->CRC FINDINGS: No evidence of acute fracture or dislocation. Normal alignment of the ankle mortise. No focal osseous lesion. No evidence of joint effusion. No focal soft tissue abnormality. No acute abnormality of the ankle.      XR FOOT RIGHT (MIN 3 VIEWS)    Result Date: 3/26/2022  EXAMINATION: THREE XRAY VIEWS OF THE RIGHT FOOT 3/26/2022 11:39 pm COMPARISON: 03/07/2022 HISTORY: ORDERING SYSTEM PROVIDED HISTORY: fall, pain TECHNOLOGIST PROVIDED HISTORY: Reason for exam:->fall, pain What reading provider will be dictating this exam?->CRC FINDINGS: There is no evidence of acute fracture. There is normal alignment of the tarsometatarsal joints. No acute joint abnormality. Plantar calcaneal spur. No focal soft tissue abnormality. Amputation of the proximal phalanx 3rd digit. Accessory navicular bone noted. No acute osseous abnormality. CT Head WO Contrast    Result Date: 4/14/2022  EXAMINATION: CT OF THE HEAD WITHOUT CONTRAST  4/14/2022 3:10 pm TECHNIQUE: CT of the head was performed without the administration of intravenous contrast. Dose modulation, iterative reconstruction, and/or weight based adjustment of the mA/kV was utilized to reduce the radiation dose to as low as reasonably achievable. COMPARISON: March 06/20/2022 HISTORY: ORDERING SYSTEM PROVIDED HISTORY: fall TECHNOLOGIST PROVIDED HISTORY: Reason for exam:->fall Has a \"code stroke\" or \"stroke alert\" been called? ->No Decision Support Exception - unselect if not a suspected or confirmed emergency medical condition->Emergency Medical Condition (MA) FINDINGS: BRAIN/VENTRICLES: There are mild age related cortical atrophy and periventricular white matter ischemic changes. There is no acute intracranial hemorrhage, mass effect or midline shift. No abnormal extra-axial fluid collection. The gray-white differentiation is maintained without evidence of an acute infarct. There is no evidence of hydrocephalus. ORBITS: The visualized portion of the orbits demonstrate no acute abnormality. SINUSES: The visualized paranasal sinuses and mastoid air cells demonstrate no acute abnormality. SOFT TISSUES/SKULL:  No acute abnormality of the visualized skull or soft tissues. No acute intracranial abnormality or hemorrhage.      CT HEAD WO CONTRAST    Result Date: 3/27/2022  EXAMINATION: CT OF THE CERVICAL SPINE WITHOUT CONTRAST; CT OF THE HEAD WITHOUT CONTRAST 3/26/2022 11:48 pm TECHNIQUE: CT of the cervical spine was performed without the administration of intravenous contrast. Multiplanar reformatted images are provided for review. Dose modulation, iterative reconstruction, and/or weight based adjustment of the mA/kV was utilized to reduce the radiation dose to as low as reasonably achievable.; CT of the head was performed without the administration of intravenous contrast. Dose modulation, iterative reconstruction, and/or weight based adjustment of the mA/kV was utilized to reduce the radiation dose to as low as reasonably achievable. COMPARISON: None. HISTORY: ORDERING SYSTEM PROVIDED HISTORY: fall TECHNOLOGIST PROVIDED HISTORY: Reason for exam:->fall Decision Support Exception - unselect if not a suspected or confirmed emergency medical condition->Emergency Medical Condition (MA) What reading provider will be dictating this exam?->CRC FINDINGS: CERVICAL SPINE: BONES/ALIGNMENT: There is no acute fracture or traumatic malalignment. DEGENERATIVE CHANGES: No significant degenerative changes. SOFT TISSUES: There is no prevertebral soft tissue swelling. HEAD: BRAIN/VENTRICLES: There is no acute intracranial hemorrhage, mass effect or midline shift. No abnormal extra-axial fluid collection. The gray-white differentiation is maintained without evidence of an acute infarct. There is no evidence of hydrocephalus. ORBITS: The visualized portion of the orbits demonstrate no acute abnormality. SINUSES: The visualized paranasal sinuses and mastoid air cells demonstrate no acute abnormality. SOFT TISSUES/SKULL: No acute abnormality of the visualized skull or soft tissues. No CT evidence of an acute intracranial abnormality. No evidence of acute fracture or traumatic malalignment of the cervical spine.      CT CHEST WO CONTRAST    Result Date: 3/27/2022  EXAMINATION: CT OF THE CHEST WITHOUT CONTRAST 3/26/2022 11:48 pm TECHNIQUE: CT of the chest was performed without the administration of intravenous contrast. Multiplanar reformatted images are provided for review. Dose modulation, iterative reconstruction, and/or weight based adjustment of the mA/kV was utilized to reduce the radiation dose to as low as reasonably achievable. COMPARISON: None. HISTORY: ORDERING SYSTEM PROVIDED HISTORY: Fall back pain TECHNOLOGIST PROVIDED HISTORY: Reason for exam:->Fall back pain Decision Support Exception - unselect if not a suspected or confirmed emergency medical condition->Emergency Medical Condition (MA) What reading provider will be dictating this exam?->CRC FINDINGS: Mediastinum: The thoracic aorta is normal in appearance. Mild cardiomegaly. No significant mediastinal adenopathy. Pericardium is unremarkable. Calcified plaque involving the coronary arteries. Lungs/pleura: Lungs are clear. No pleural effusions or pneumothorax. Upper Abdomen: Splenomegaly. Soft Tissues/Bones: No fractures. Atraumatic appearance of the chest. Splenomegaly. CT Cervical Spine WO Contrast    Result Date: 4/14/2022  EXAMINATION: CT OF THE CERVICAL SPINE WITHOUT CONTRAST 4/14/2022 3:10 pm TECHNIQUE: CT of the cervical spine was performed without the administration of intravenous contrast. Multiplanar reformatted images are provided for review. Dose modulation, iterative reconstruction, and/or weight based adjustment of the mA/kV was utilized to reduce the radiation dose to as low as reasonably achievable. COMPARISON: March 26, 2022 HISTORY: ORDERING SYSTEM PROVIDED HISTORY: fall TECHNOLOGIST PROVIDED HISTORY: Reason for exam:->fall Decision Support Exception - unselect if not a suspected or confirmed emergency medical condition->Emergency Medical Condition (MA) FINDINGS: BONES/ALIGNMENT: There is no acute fracture or traumatic malalignment. There is straightening of normal lordotic curvature likely due to muscular spasm and/or positioning of the neck. DEGENERATIVE CHANGES: No significant degenerative changes. SOFT TISSUES: There is no prevertebral soft tissue swelling.      No acute abnormality of the cervical spine. CT CERVICAL SPINE WO CONTRAST    Result Date: 3/27/2022  EXAMINATION: CT OF THE CERVICAL SPINE WITHOUT CONTRAST; CT OF THE HEAD WITHOUT CONTRAST 3/26/2022 11:48 pm TECHNIQUE: CT of the cervical spine was performed without the administration of intravenous contrast. Multiplanar reformatted images are provided for review. Dose modulation, iterative reconstruction, and/or weight based adjustment of the mA/kV was utilized to reduce the radiation dose to as low as reasonably achievable.; CT of the head was performed without the administration of intravenous contrast. Dose modulation, iterative reconstruction, and/or weight based adjustment of the mA/kV was utilized to reduce the radiation dose to as low as reasonably achievable. COMPARISON: None. HISTORY: ORDERING SYSTEM PROVIDED HISTORY: fall TECHNOLOGIST PROVIDED HISTORY: Reason for exam:->fall Decision Support Exception - unselect if not a suspected or confirmed emergency medical condition->Emergency Medical Condition (MA) What reading provider will be dictating this exam?->CRC FINDINGS: CERVICAL SPINE: BONES/ALIGNMENT: There is no acute fracture or traumatic malalignment. DEGENERATIVE CHANGES: No significant degenerative changes. SOFT TISSUES: There is no prevertebral soft tissue swelling. HEAD: BRAIN/VENTRICLES: There is no acute intracranial hemorrhage, mass effect or midline shift. No abnormal extra-axial fluid collection. The gray-white differentiation is maintained without evidence of an acute infarct. There is no evidence of hydrocephalus. ORBITS: The visualized portion of the orbits demonstrate no acute abnormality. SINUSES: The visualized paranasal sinuses and mastoid air cells demonstrate no acute abnormality. SOFT TISSUES/SKULL: No acute abnormality of the visualized skull or soft tissues. No CT evidence of an acute intracranial abnormality.  No evidence of acute fracture or traumatic malalignment of the cervical spine.     CT ABDOMEN PELVIS W IV CONTRAST Additional Contrast? Oral    Result Date: 4/17/2022  EXAMINATION: CT OF THE ABDOMEN AND PELVIS WITH CONTRAST 4/17/2022 7:46 pm TECHNIQUE: CT of the abdomen and pelvis was performed with the administration of intravenous contrast. Multiplanar reformatted images are provided for review. Dose modulation, iterative reconstruction, and/or weight based adjustment of the mA/kV was utilized to reduce the radiation dose to as low as reasonably achievable. COMPARISON: 02/04/2021. HISTORY: ORDERING SYSTEM PROVIDED HISTORY: right sided pain. low BP. On Eliquis. R/O retroperitoneal bleed TECHNOLOGIST PROVIDED HISTORY: Reason for exam:->right sided pain. low BP. On Eliquis. R/O retroperitoneal bleed Additional Contrast?->Oral FINDINGS: The lung bases demonstrate atelectasis and  infiltrates in lung bases likely CHF and or pneumonia. There is diffuse coronary artery calcification. There is hepatosplenomegaly with liver measuring 17.5 cm which is fatty and spleen measuring 15 cm. Gallbladder is absent. Pancreas, the adrenals and the kidneys are normal.  There is no retroperitoneal hematoma. Degenerative changes are identified in the lumbar spine. Pelvis. The bladder is partially contracted with mild wall thickening. Please correlate with urinalysis. Colon is partially collapsed. Appendix is partially identified. Inflammatory stranding densities are identified in the abdomen pelvis in the subcutaneous tissue more on the right lateral abdomen and pelvis. There is no retroperitoneal hematoma. Infiltrates and pleural effusion lung bases likely CHF and or pneumonia. Anasarca with inflammatory changes in the right lateral abdominal wall.  RECOMMENDATIONS: Unavailable     XR CHEST PORTABLE    Result Date: 4/14/2022  EXAMINATION: ONE XRAY VIEW OF THE CHEST PORTABLE UPRIGHT 4/14/2022 3:10 pm COMPARISON: 03/16/2022 HISTORY: ORDERING SYSTEM PROVIDED HISTORY: left rib pain TECHNOLOGIST PROVIDED HISTORY: Reason for exam:->left rib pain FINDINGS: Chest evaluation partly limited by portable technique and large body habitus. Mild cardiomegaly, unchanged. Cephalization of the pulmonary vascularity within the right upper lobe in keeping with mild pulmonary venous congestion. No focal infiltrate or pleural effusion. No pneumothorax. Cardiomegaly and mild pulmonary venous congestion. No pneumonia. Fluoroscopy modified barium swallow with video    Result Date: 4/15/2022  EXAMINATION: MODIFIED BARIUM SWALLOW WAS PERFORMED IN CONJUNCTION WITH SPEECH PATHOLOGY SERVICES WITH RONN UNIT DICOM DISPLAY TECHNIQUE: Fluoroscopic evaluation of the swallowing mechanism was performed using cineradiography with multiple consistency of barium product in conjunction with speech pathology services. FLUOROSCOPY DOSE AND TYPE OR TIME AND EXPOSURES: Images: Cine fluoroscopy Fluoroscopic time: 1.4 minutes Total dose: 26.67 mGy COMPARISON: None HISTORY: ORDERING SYSTEM PROVIDED HISTORY: difficulty swallowing TECHNOLOGIST PROVIDED HISTORY: Reason for exam:->difficulty swallowing FINDINGS: Oral phase mild swallowing delay. No significant barium residuals. Satisfactory pharyngeal phase of the swallow with the exception of aspiration of thin liquid barium when using a straw. Swallows were otherwise satisfactory. In     Thin liquid aspiration when using a straw. Otherwise satisfactory swallowing mechanism. Please see separate speech pathology report for full discussion of findings and recommendations.        IMPRESSION:  · Hepatosplenomegaly  · CHF- cardiology following   · A fib on Eliquis- cardiology following   · Anemia, normocytic   · CKD  · DM  · Morbid obesity     RECOMMENDATIONS:    · PAT today with cardiology  · Hepatitis panel ordered   · Serology pending per PCP, to include viral studies will include mono   · Diet as tolerated  · Medical management per PCP  · Supportive care  · Continue to monitor     Note: This report was completed utilizing computer voice recognition software. Every effort has been made to ensure accuracy, however; inadvertent computerized transcription errors may be present. Thank you very much for your consultation. We will follow closely with you. Discussed with Dr. Cathy Syed developed by Dr. Rach Deleon, RASHARD, NP-C 4/18/2022 10:33 AM for Dr. Gabriella Crum. I HAD A FACE TO FACE ENCOUNTER WITH THE PATIENT. AGREE WITH THE EXAM, ASSESSMENT, AND PLAN AS OUTLINED ABOVE. ADDITION AND CORRECTIONS WERE DONE AS DEEMED APPROPRIATE. MY EXAM AND PLAN INCLUDE: HEPATOSPLENOMEGALY DIFFERENTIAL DIAGNOSIS PORTAL HYPERTENSION SECONDARY TO ESPINOZA VS OTHERS. AWAITING SEROLOGY. GIVEN FINDINGS AT CERTAIN POINT PATIENT WILL NEED EGD TO RULE OUT VARICES WITH POSSIBLE BANDING. DISCUSSED WITH PATIENT AND GRANDDAUGHTER AT BEDSIDE.

## 2022-04-18 NOTE — PROGRESS NOTES
Occupational Therapy  OT BEDSIDE TREATMENT NOTE      Date:2022  Patient Name: Lou Guevara  MRN: [id number]  : 1950  Room: [address] [place], [place] [address]         Evaluating OT: ILEANA Gonzales/DIPAK [id number]       Referring Provider: RASHARD Barrett CNP    Specific Provider Orders/Date: OT eval and treat 22       Diagnosis: Acute renal insufficiency [N28.9]  New onset a-fib ([facility] [I48.91]  Atrial fibrillation with RVR ([facility] [I48.91]  Acute on chronic congestive heart failure, unspecified heart failure type ([address] [I50.9]      Pertinent Medical History: anxiety, asthma, CAD, CA, CKD, DM, HTN, Parkinson's       Precautions:  Fall Risk      Assessment of current deficits    [x]? Functional mobility            [x]?ADLs           [x]? Strength                  []?Cognition    [x]? Functional transfers          [x]? IADLs         [x]? Safety Awareness   [x]? Endurance    [x]? Fine Coordination                         [x]? Balance      []? Vision/perception   []? Sensation      []? Gross Motor Coordination             []? ROM           []?  Delirium                   []? Motor Control      OT PLAN OF CARE   OT POC based on physician orders, patient diagnosis and results of clinical assessment     Frequency/Duration 2-4 days/wk for 2 weeks PRN   Specific OT Treatment Interventions to include:   * Instruction/training on adapted ADL techniques and AE recommendations to increase functional independence within precautions       * Training on energy conservation strategies, correct breathing pattern and techniques to improve independence/tolerance for self-care routine  * Functional transfer/mobility training/DME recommendations for increased independence, safety, and fall prevention  * Patient/Family education to increase follow through with safety techniques and functional independence  * Recommendation of environmental modifications for increased safety with functional transfers/mobility and ADLs  * Therapeutic exercise to improve motor endurance, ROM, and functional strength for ADLs/functional transfers  * Therapeutic activities to facilitate/challenge dynamic balance, stand tolerance for increased safety and independence with ADLs  * Therapeutic activities to facilitate gross/fine motor skills for increased independence with ADLs  * Neuro-muscular re-education: facilitation of righting/equilibrium reactions, midline orientation, scapular stability/mobility, normalization of muscle tone, and facilitation of volitional active controled movement  * Positioning to improve skin integrity, interaction with environment and functional independence        Recommended Adaptive Equipment: TBD      Home Living: Pt lives alone in an apartment with elevator access. Bathroom setup: walk in shower with shower chair and grab bars   Equipment owned: rollator     Prior Level of Function: independent with ADLs , assist with IADLs; functional mobility: rollator     Pain Level: pt did not report pain. Cognition: Lethargic and appears somewhat confused. Pt just returned from procedure. Limited direction following. Poor recall of instructions. Poor safety awareness. Functional Assessment:  AM-PAC Daily Activity Raw Score: 13/24    Initial Eval Status  Date: 4/15/22 Treatment Status  Date:4/18/22  STGs = LTGs  Time frame: 10-14 days   Feeding Set up       Grooming Mod A Mod A to comb hair.   Unable to comb thoroughly due to hair matted in the back from laying in the bed.     SBA   UB Dressing Mod A   SBA   LB Dressing Max A  To adjust socks Max A    Min A-with use of AD as appropriate/needed   Bathing Max A   Min A -with use of AD as appropriate/needed   Toileting Max A   For clothing management and pericare following a bowel movement Pt requesting to sit on the [facility] however still groggy from procedure and unsafe to stand at this time.   Min A    Bed Mobility  Supine to sit: Mod A   Sit to supine: Min A Max A supine <> sit   SBA   Functional Transfers Mod A with wheeled walker  Sit<>stand from EOB  Sit<>Stand from [address]  Stand pivot to BSC/bed  Cues for hand placement and safe technique  Knees buckling in standing Unable to complete at this time due to safety.   CGA    Functional Mobility NT. D/t decreased standing tolerance and balance   Min A -with device as needed to maximize independence with ADLs and functional task completion   Balance Sitting:     Static:  SBA    Dynamic:SBA  Standing: Mod A with wheeled walker Min A sit balance. Limited safety as pt continually scooting to the EOB.    Sup for static/dynamic sitting balance to maximize independence with ADLs and functional task completion     CGA/SBA for standing balance to maximize independence with ADLs and functional task completion   Activity Tolerance Fair- with light activity Poor+   Fair with ADL activity. Pt will demonstrate good understanding of education provided on EC/WS techniques        Comments:  /case management requesting updated therapy notes for insurance precert. Pt just returned from procedure and groggy. She appears somewhat confused and poor safety awareness noted. Completed sitting on the side of the bed however unsafe for transfers at this time. Assisted back into the bed at the end of this session. Pt requesting nursing to assist her to find her glasses and purple jacket. Nursing notified. Exercise: fair use of UE's for support while seated on the side of the bed and for ADL activity. Education/treatment:  ADL retraining with facilitation of movement to increase self care skills. Therapeutic activity to address balance and endurance for ADL and transfers. Pt education of daily orientation, walker safety, and transfer safety. · Pt has made  progress towards set goals.        Time In: 1:12  Time Out: 1:30      Min Units   Therapeutic Ex 53381     Therapeutic Activities 36361 8    ADL/Self Care 34336 10 1   Orthotic Management 25260     Neuro Re-Ed 75482     Non-Billable Time     TOTAL TIMED TREATMENT 18 [address] [facility] NEGRO/L 69778

## 2022-04-18 NOTE — PROGRESS NOTES
17 g, Daily PRN  acetaminophen, 650 mg, Q6H PRN   Or  acetaminophen, 650 mg, Q6H PRN  dextrose bolus (hypoglycemia), 125 mL, PRN   Or  dextrose bolus (hypoglycemia), 250 mL, PRN  albuterol, 2.5 mg, Q4H PRN    Objective:    /78   Pulse 126   Temp 98.1 °F (36.7 °C) (Oral)   Resp 18   Ht 5' (1.524 m)   Wt 244 lb 6.4 oz (110.9 kg)   SpO2 95%   BMI 47.73 kg/m²     General Appearance: alert and in no acute distress  Skin: warm and dry  Head: normocephalic and atraumatic  Eyes: pupils equal, round, and reactive to light, extraocular eye movements intact, conjunctivae normal  Neck: neck supple and non tender without mass   Pulmonary/Chest: clear to auscultation bilaterally- no wheezes, rales or rhonchi, normal air movement, no respiratory distress  Cardiovascular: normal rate, normal S1 and S2 and no carotid bruits  Abdomen: soft, non-tender, non-distended, normal bowel sounds, no masses or organomegaly  Extremities: no cyanosis, no clubbing and no edema  Neurologic: no cranial nerve deficit and speech normal    Recent Labs     04/16/22  0255 04/17/22  0928 04/18/22  0326    140 136   K 3.7 4.6 4.4    101 99   CO2 29 33* 29   BUN 16 17 17   CREATININE 1.5* 1.3* 1.3*   GLUCOSE 115* 196* 158*   CALCIUM 8.4* 8.4* 8.5*       Recent Labs     04/15/22  1000 04/18/22  0326   WBC 5.8 6.2   RBC 4.20 3.96   HGB 11.1* 10.3*   HCT 37.1 35.4   MCV 88.3 89.4   MCH 26.4 26.0   MCHC 29.9* 29.1*   RDW 17.1* 17.0*    208   MPV 9.5 9.8     Radiology:   CT ABDOMEN PELVIS W IV CONTRAST Additional Contrast? Oral    Result Date: 4/17/2022  EXAMINATION: CT OF THE ABDOMEN AND PELVIS WITH CONTRAST 4/17/2022 7:46 pm TECHNIQUE: CT of the abdomen and pelvis was performed with the administration of intravenous contrast. Multiplanar reformatted images are provided for review.  Dose modulation, iterative reconstruction, and/or weight based adjustment of the mA/kV was utilized to reduce the radiation dose to as low as reasonably achievable. COMPARISON: 02/04/2021. HISTORY: ORDERING SYSTEM PROVIDED HISTORY: right sided pain. low BP. On Eliquis. R/O retroperitoneal bleed TECHNOLOGIST PROVIDED HISTORY: Reason for exam:->right sided pain. low BP. On Eliquis. R/O retroperitoneal bleed Additional Contrast?->Oral FINDINGS: The lung bases demonstrate atelectasis and  infiltrates in lung bases likely CHF and or pneumonia. There is diffuse coronary artery calcification. There is hepatosplenomegaly with liver measuring 17.5 cm which is fatty and spleen measuring 15 cm. Gallbladder is absent. Pancreas, the adrenals and the kidneys are normal.  There is no retroperitoneal hematoma. Degenerative changes are identified in the lumbar spine. Pelvis. The bladder is partially contracted with mild wall thickening. Please correlate with urinalysis. Colon is partially collapsed. Appendix is partially identified. Inflammatory stranding densities are identified in the abdomen pelvis in the subcutaneous tissue more on the right lateral abdomen and pelvis. There is no retroperitoneal hematoma. Infiltrates and pleural effusion lung bases likely CHF and or pneumonia. Anasarca with inflammatory changes in the right lateral abdominal wall. RECOMMENDATIONS: Unavailable     Assessment:    Principal Problem:    New onset a-fib (HCC)  Active Problems:    Asthma    Hyperlipidemia    Obstructive sleep apnea syndrome    Diabetes mellitus ([address]    Parkinson's disease ([address]    Primary hypertension    Morbid obesity (HCC)    Acute on chronic congestive heart failure (HCC)    Coronary artery disease involving native coronary artery of native heart without angina pectoris    Dysphagia  Resolved Problems:    * No resolved hospital problems. *    Plan:  1.     New onset atrial fibrillation - oral Toprol increased.  Eliquis for nonvalvular A. fib.  DC aspirin.  SUAD Cozaar.  Appreciate these recommendations by cardiology.  Continue telemetry.  Heart rate today 84.   For PAT/cardioversion today at 1030  2.  Dysphagia -aspiration noted on modified barium swallow using a straw.  Diet changed to soft bite-size foods.  Okay for thin liquids without any straws. 3.  HTN - Continue meds.  Monitor vitals and adjust accordingly  4.  Hyperlipidemia - Continue meds.  Lipid panel 97/171/46/17. 5.  T2DM - last A1c March 2022 6.9%.  Glucose point-of-care therapy.  Diabetic diet.  Hypoglycemia protocol.  Resume home meds as in good control. 6.  Parkinson's Disease - stable on Sinemet.  Continue same. 7.  RIght sided abdominal pain -has hepatosplenomegaly on CAT scan. No retroperitoneal bleed. Etiology unclear. GI consult. Screening tests ordered. NOTE: This report was transcribed using voice recognition software. Every effort was made to ensure accuracy; however, inadvertent computerized transcription errors may be present.     Electronically signed by John Day MD on 4/18/2022 at 8:36 AM

## 2022-04-18 NOTE — PROGRESS NOTES
Patient Name: Charli Newby   Medical Record Number: [id number]  Date: 4/18/2022   Time: 1015    Room/Bed: 0406/0406-A  Cardioversion Procedure Note  Indication: atrial fibrillation    Consent: The patient provided verbal consent for this procedure. Pre-Medication: propofol 140 mg intravenously    Procedure: The patient was placed in the supine position and the chest area was exposed. The cardioversion pads were applied in the standard manner and configuration. Attempt #1: The defibrillator was set on the synchronous mode and charged to 200 joules. A charge was then delivered which resulted in conversion to normal sinus rhythm. Attempt #2: Not necessary    Attempt #3: Not necessary    The patient tolerated the procedure well.     Complications: None    Electronically Signed by: @devin@

## 2022-04-18 NOTE — PROGRESS NOTES
Physical Therapy    Facility/Department: [address] INTERNAL MEDICINE 2  Treatment note    NAME: Randee Buck  : 1950  MRN: [id number]    Date of Service: 2022      Patient Diagnosis(es): The primary encounter diagnosis was New onset a-fib [facility]). Diagnoses of Acute on chronic congestive heart failure, unspecified heart failure type ([address] and Acute renal insufficiency were also pertinent to this visit. has a past medical history of Acute on chronic congestive heart failure ([address] Anxiety, Asthma, CAD (coronary artery disease), Cancer ([facility] Chronic kidney disease, Depression, Diabetes mellitus ([facility] H/O mammogram, Hx MRSA infection, Hyperlipidemia, Hypertension, Lateral epicondylitis, SERENA on CPAP, Parkinson's disease ([facility] and Tubal ligation status. has a past surgical history that includes Gallbladder surgery; Toe amputation; Cholecystectomy; Colonoscopy (7/29/15); Endoscopy, colon, diagnostic (7/19/15); Upper gastrointestinal endoscopy; lumbar laminectomy; Tonsillectomy; Breast lumpectomy; Breast reduction surgery; Cardiac catheterization (2014); and Cardiac catheterization (2022). Evaluating Therapist: Brenda Lennon PT      Room #:  4361/6136-Q  Diagnosis:  Acute renal insufficiency [N28.9]  New onset a-fib ([facility] [I48.91]  Atrial fibrillation with RVR ([facility] [facility] [I48.91]  Acute on chronic congestive heart failure, unspecified heart failure type ([facility] [facility] [I50.9]  PMHx/PSHx:  CHF, DM, Parkinson's  Precautions:  Falls, alarm      Social:  Pt lives with alone in a third floor apartment with elevator access. Prior to admission ambulates with rollator     Initial Evaluation  Date: 4/15/22 Treatment  2022    Short Term/ Long Term   Goals   Was pt agreeable to Eval/treatment? yes yes    Does pt have pain? No complaints    Bed Mobility  Rolling: mod assist  Supine to sit: mod assist  Sit to supine: min assist  Scooting: mod assist Rolling: Max A  Supine to sit:  Max A  Sit to supine: Max A   Scooting: Max A of 2 supine up in bed Min assist   Transfers Sit to stand: mod assist  Stand to sit: mod assist  Stand pivot: mod assist NT. See comments Min assist   Ambulation    NT secondary to legs buckling in standing. NT 25 feet with ww with min assist   Stair Negotiation  Ascended and descended  NT   N/A   LE strength     3+/5    4/5   balance      poor     AM-PAC Raw score               10/24 10/24          Pt is alert and able to follow repeated instruction, at times appeared with confusion  Balance: poor sitting EOB    Pt performed therapeutic exercise of the following: seated EOB B ankle pumps, LAQ's x 10 to 15 reps AROM    Patient education  Pt was educated on exercise promoting circulation and strengthening, UE usage to assist with sitting balance    Patient response to education:   Pt verbalized understanding Pt demonstrated skill Pt requires further education in this area   yes With repeated instruction yes     ASSESSMENT:   Comments: Nurse ok with rx. Pt found in bed, agreed to rx, states just returned from a procedure, feels groggy and at times dizzy. Pt assisted to EOB, sat EOB with Min A for balance. Pt unsteady sitting EOB, presently unsafe to stand off the EOB due to dizziness and feeling groggy. Pt assisted back to bed, Max A required to re position Pt in bed promoting comfort. Treatment: Pt practiced and was instructed in the following treatment: therapeutic exercise, sitting balance    Pt was left in bed as found with call light in reach    Chair/bed alarm: bed alarm active    Time in 1315   Time out 1333   Total Treatment Time 18 minutes   CPT codes:     Therapeutic activities 45021 18 minutes   Therapeutic exercises 38022 0 minutes       Pt is making minimal progress toward established Physical Therapy goals. Continue with physical therapy current plan of care promoting standing tolerance and transfers as able next session.     Shanna Dexter PTA   License Number: [id number]

## 2022-04-18 NOTE — DISCHARGE INSTR - COC
Continuity of Care Form    Patient Name: Hui Beckett   :  [birth year]  MRN:  [id number]    Admit date:  2022  Discharge date:  22    Code Status Order: Full Code   Advance Directives:      Admitting Physician:  Codey Hernandez MD  PCP: No primary care provider on file. Discharging Nurse: [facility] [address] [facility] Unit/Room#: 0353/1895-F  Discharging Unit Phone Number: [phone]    Emergency Contact:   Extended Emergency Contact Information  Primary Emergency Contact: Lana Valdes [address] Phone: [phone]  Mobile Phone: [phone]  Relation: Child   needed? No  Secondary Emergency Contact: Nick Burt  Mobile Phone: [phone]  Relation: Child   needed?  No    Past Surgical History:  Past Surgical History:   Procedure Laterality Date    BREAST LUMPECTOMY      BREAST REDUCTION SURGERY      CARDIAC CATHETERIZATION  2014    Dr. Dominique Saldivar  2022    Dr. Katalina Rice  7/29/15    ENDOSCOPY, COLON, DIAGNOSTIC  7/19/15    GALLBLADDER SURGERY      LUMBAR LAMINECTOMY      TOE AMPUTATION      TONSILLECTOMY      UPPER GASTROINTESTINAL ENDOSCOPY         Immunization History:   Immunization History   Administered Date(s) Administered    Influenza Vaccine, unspecified formulation 10/15/2014    Influenza Virus Vaccine 10/31/2008, 10/05/2011    Influenza, High Dose (Fluzone 65 yrs and older) 2015, 2016, 2017, 2019    Influenza, Triv, inactivated, subunit, adjuvanted, IM (Fluad 65 yrs and older) 2019    Pneumococcal Conjugate 13-valent (Pixxrqu87) 2016    Pneumococcal Polysaccharide (Cemobvuak51) 2012, 2018    Td, unspecified formulation 2014    Tdap (Boostrix, Adacel) 2015    Zoster Live (Zostavax) 2013    Zoster Recombinant (Shingrix) 12/10/2019       Active Problems:  Patient Active Problem List   Diagnosis Code Depression with anxiety F41.8    Osteoporosis M81.0    Asthma J45.909    Hyperlipidemia E78.5    Mitral regurgitation I34.0    Obstructive sleep apnea syndrome G47.33    Psoriasis L40.9    Diabetes mellitus ([facility] E11.9    Parkinson's disease ([facility] G20    Primary hypertension I10    Microalbuminuria R80.9    Morbid obesity ([facility] E66.01    RLS (restless legs syndrome) G25.81    Generalized weakness R53.1    Inability to walk R26.2    Hypothyroidism E03.9    Chest pain R07.9    Acute asthma exacerbation J45.901    Asthma exacerbation, mild J45.901    New onset a-fib ([facility]) I48.91    Atrial fibrillation with RVR ([facility]) I48.91    Acute on chronic congestive heart failure ([facility]) I50.9    Coronary artery disease involving native coronary artery of native heart without angina pectoris I25.10    Dysphagia R13.10       Isolation/Infection:   Isolation            No Isolation          Patient Infection Status       Infection Onset Added Last Indicated Last Indicated By Review Planned Expiration Resolved Resolved By    None active    Resolved    COVID-19 (Rule Out) 03/17/22 03/17/22 03/17/22 Respiratory Panel, Molecular, with COVID-19 (Restricted: peds pts or suitable admitted adults) (Ordered)   03/17/22 Rule-Out Test Resulted    COVID-19 (Rule Out) 03/16/22 03/16/22 03/16/22 COVID-19, Rapid (Ordered)   03/16/22 Rule-Out Test Resulted    C-diff Rule Out 01/10/22 01/10/22 01/10/22 C. difficile toxin Molecular (Ordered)   03/17/22 Sis Washingtonceled  Geovany Schroeder RN 1/11/22 1300     COVID-19 (Rule Out) 01/09/22 01/09/22 01/09/22 COVID-19, Rapid (Ordered)   01/09/22 Rule-Out Test Resulted    MRSA 05/19/21 05/21/21 05/19/21 Culture, Wound   10/25/21 Eboni Johnson RN    COVID-19 (Rule Out) 02/04/21 02/04/21 02/04/21 COVID-19 (Ordered)   02/04/21 Rule-Out Test Resulted            Nurse Assessment:  Last Vital Signs: /61   Pulse 61   Temp 97.6 °F (36.4 °C) (Temporal)   Resp 18   Ht 5' (1.524 m)   Wt 244 lb 6.4 oz (110.9 kg)   SpO2 97%   BMI 47.73 kg/m²     Last documented pain score (0-10 scale): Pain Level: 0  Last Weight:   Wt Readings from Last 1 Encounters:   04/18/22 244 lb 6.4 oz (110.9 kg)     Mental Status:  Alert , oriented, but does have periods of forgetfulness and has been delusional, paranoid    IV Access:  - None    Nursing Mobility/ADLs:  Walking   Dependent  Transfer  Dependent  Bathing  Dependent  Dressing  Dependent  Toileting  Dependent  Feeding  Independent  Med Admin  Assisted  Med Delivery   whole    Wound Care Documentation and Therapy:  Wound Toe (Comment  which one) Anterior; Left (Active)   Number of days:         Elimination:  Continence: Bowel: Yes  Bladder: Yes  Urinary Catheter: None   Colostomy/Ileostomy/Ileal Conduit: No       Date of Last BM: 4/19/22    Intake/Output Summary (Last 24 hours) at 4/18/2022 1208  Last data filed at 4/18/2022 1133  Gross per 24 hour   Intake 350 ml   Output 500 ml   Net -150 ml     I/O last 3 completed shifts:  In: -   Out: 500 [Urine:500]    Safety Concerns:     History of Falls (last 30 days) and At Risk for Falls    Impairments/Disabilities:      None    Nutrition Therapy:  Current Nutrition Therapy:   - Oral Diet:  Carb Control 5 carbs/meal (2000kcals/day), Dysphagia 2 mechanically altered, and minced and moist no straws    Routes of Feeding: Oral  Liquids: Thin Liquids  Daily Fluid Restriction: no  Last Modified Barium Swallow with Video (Video Swallowing Test): 4/14      Treatments at the Time of Hospital Discharge:   Respiratory Treatments: brovana nebulizer bid, pulmicort bid  Oxygen Therapy:  is not on home oxygen therapy.   Ventilator:    - No ventilator support    Rehab Therapies: Physical Therapy, Occupational Therapy, and Speech/Language Therapy  Weight Bearing Status/Restrictions: No weight bearing restrictions  Other Medical Equipment (for information only, NOT a DME order):  bedside commode  Other Treatments: ***    Patient's personal belongings (please select all that are sent with patient):  Glasses, Dentures upper    RN SIGNATURE:  DIPAK Wilkinson RN    CASE MANAGEMENT/SOCIAL WORK SECTION    Inpatient Status Date: ***    Readmission Risk Assessment Score:  Readmission Risk              Risk of Unplanned Readmission:  35           Discharging to Facility/ Agency   Name: Mohit Murphy  Babs Manerojas  CLCYC:[phone]  QMZ:[phone]    Dialysis Facility (if applicable)   Name:  Address:  Dialysis Schedule:  Phone:  Fax:    / signature: Electronically signed by ESTHER Todd on 4/18/2022 at 12:08 PM      PHYSICIAN SECTION    Prognosis: {Prognosis:[id number]}    Condition at Discharge: Tobias Ye Patient Condition:[id number]}    Rehab Potential (if transferring to Rehab): {Prognosis:5454892923}    Recommended Labs or Other Treatments After Discharge:  BMP on 4/22/22. Results to SNF MD    Physician Certification: I certify the above information and transfer of Nicole Chilel  is necessary for the continuing treatment of the diagnosis listed and that she requires skilled snf for ZYTQ:[id number]} 30 days.      Update Admission H&P: No change in H&P    PHYSICIAN SIGNATURE:  Electronically signed by Michelle Lorenzana MD on 4/19/22 at 1:17 PM EDT

## 2022-04-18 NOTE — ANESTHESIA POSTPROCEDURE EVALUATION
Department of Anesthesiology  Postprocedure Note    Patient: Madhuri Cortez  MRN: [id number]  YOB: 1950  Date of evaluation: 4/18/2022  Time:  12:53 PM     Procedure Summary     Date: 04/18/22 Room / Location: [facility] Echocardiography    Anesthesia Start: 1031 Anesthesia Stop: 1058    Procedures:       TRANSESOPHAGEAL ECHO      Procedure Not Yet Scheduled Diagnosis:     Scheduled Providers:  Responsible Provider: Shavon Kearney MD    Anesthesia Type: MAC ASA Status: 4          Anesthesia Type: MAC    Ifeoma Phase I: Ifeoma Score: 9    Ifeoma Phase II: Ifeoma Score: 9    Last vitals: Reviewed and per EMR flowsheets.        Anesthesia Post Evaluation    Patient location during evaluation: PACU  Patient participation: complete - patient participated  Level of consciousness: awake and alert  Airway patency: patent  Nausea & Vomiting: no nausea and no vomiting  Complications: no  Cardiovascular status: hemodynamically stable  Respiratory status: acceptable  Hydration status: stable

## 2022-04-18 NOTE — PROGRESS NOTES
Pt keeps saying that somebody \"stole\" her pink phone, matthew visited and said pt has been demonstrating increased delusional behavior and paranoia, she confirmed that pt does not have a pink cell phone, she reports pt as having labile behaviors, very pleasant and cooperative, then very anxious and accusatory

## 2022-04-19 VITALS
BODY MASS INDEX: 47.98 KG/M2 | WEIGHT: 244.4 LBS | TEMPERATURE: 97.9 F | DIASTOLIC BLOOD PRESSURE: 63 MMHG | HEIGHT: 60 IN | OXYGEN SATURATION: 94 % | HEART RATE: 74 BPM | RESPIRATION RATE: 18 BRPM | SYSTOLIC BLOOD PRESSURE: 118 MMHG

## 2022-04-19 LAB
ANION GAP SERPL CALCULATED.3IONS-SCNC: 8 MMOL/L (ref 7–16)
BUN BLDV-MCNC: 22 MG/DL (ref 6–23)
CALCIUM SERPL-MCNC: 8.8 MG/DL (ref 8.6–10.2)
CHLORIDE BLD-SCNC: 100 MMOL/L (ref 98–107)
CO2: 29 MMOL/L (ref 22–29)
CREAT SERPL-MCNC: 1.3 MG/DL (ref 0.5–1)
GFR AFRICAN AMERICAN: 49
GFR NON-AFRICAN AMERICAN: 40 ML/MIN/1.73
GLUCOSE BLD-MCNC: 128 MG/DL (ref 74–99)
HAV IGM SER IA-ACNC: NORMAL
HEPATITIS B CORE IGM ANTIBODY: NORMAL
HEPATITIS B SURFACE ANTIGEN INTERPRETATION: NORMAL
HEPATITIS C ANTIBODY INTERPRETATION: NORMAL
METER GLUCOSE: 118 MG/DL (ref 74–99)
METER GLUCOSE: 132 MG/DL (ref 74–99)
METER GLUCOSE: 149 MG/DL (ref 74–99)
POTASSIUM SERPL-SCNC: 4.8 MMOL/L (ref 3.5–5)
SARS-COV-2, NAAT: NOT DETECTED
SMOOTH MUSCLE ANTIBODY: NEGATIVE
SODIUM BLD-SCNC: 137 MMOL/L (ref 132–146)

## 2022-04-19 PROCEDURE — 6370000000 HC RX 637 (ALT 250 FOR IP): Performed by: NURSE PRACTITIONER

## 2022-04-19 PROCEDURE — 6360000002 HC RX W HCPCS: Performed by: NURSE PRACTITIONER

## 2022-04-19 PROCEDURE — 36415 COLL VENOUS BLD VENIPUNCTURE: CPT

## 2022-04-19 PROCEDURE — 99239 HOSP IP/OBS DSCHRG MGMT >30: CPT | Performed by: FAMILY MEDICINE

## 2022-04-19 PROCEDURE — 82962 GLUCOSE BLOOD TEST: CPT

## 2022-04-19 PROCEDURE — 94640 AIRWAY INHALATION TREATMENT: CPT

## 2022-04-19 PROCEDURE — 80048 BASIC METABOLIC PNL TOTAL CA: CPT

## 2022-04-19 PROCEDURE — 92526 ORAL FUNCTION THERAPY: CPT | Performed by: SPEECH-LANGUAGE PATHOLOGIST

## 2022-04-19 PROCEDURE — 99232 SBSQ HOSP IP/OBS MODERATE 35: CPT | Performed by: INTERNAL MEDICINE

## 2022-04-19 PROCEDURE — 6370000000 HC RX 637 (ALT 250 FOR IP): Performed by: FAMILY MEDICINE

## 2022-04-19 PROCEDURE — 2580000003 HC RX 258: Performed by: FAMILY MEDICINE

## 2022-04-19 PROCEDURE — 6370000000 HC RX 637 (ALT 250 FOR IP): Performed by: CLINICAL NURSE SPECIALIST

## 2022-04-19 PROCEDURE — 87635 SARS-COV-2 COVID-19 AMP PRB: CPT

## 2022-04-19 RX ORDER — LIDOCAINE 4 G/G
1 PATCH TOPICAL DAILY
Qty: 5 PATCH | Refills: 0 | Status: ON HOLD | OUTPATIENT
Start: 2022-04-20 | End: 2022-07-07

## 2022-04-19 RX ADMIN — VENLAFAXINE HYDROCHLORIDE 150 MG: 150 CAPSULE, EXTENDED RELEASE ORAL at 09:09

## 2022-04-19 RX ADMIN — Medication 1000 UNITS: at 09:11

## 2022-04-19 RX ADMIN — CARBIDOPA AND LEVODOPA 2 TABLET: 25; 100 TABLET, EXTENDED RELEASE ORAL at 13:35

## 2022-04-19 RX ADMIN — BUDESONIDE 500 MCG: 0.5 SUSPENSION RESPIRATORY (INHALATION) at 08:10

## 2022-04-19 RX ADMIN — ARFORMOTEROL TARTRATE 15 MCG: 15 SOLUTION RESPIRATORY (INHALATION) at 08:10

## 2022-04-19 RX ADMIN — ROPINIROLE HYDROCHLORIDE 1 MG: 1 TABLET, FILM COATED ORAL at 09:09

## 2022-04-19 RX ADMIN — METOPROLOL SUCCINATE 25 MG: 25 TABLET, FILM COATED, EXTENDED RELEASE ORAL at 09:09

## 2022-04-19 RX ADMIN — PANTOPRAZOLE SODIUM 40 MG: 40 TABLET, DELAYED RELEASE ORAL at 06:48

## 2022-04-19 RX ADMIN — LAMOTRIGINE 200 MG: 100 TABLET ORAL at 09:09

## 2022-04-19 RX ADMIN — ROPINIROLE HYDROCHLORIDE 1 MG: 1 TABLET, FILM COATED ORAL at 13:35

## 2022-04-19 RX ADMIN — ISOSORBIDE MONONITRATE 30 MG: 30 TABLET, EXTENDED RELEASE ORAL at 09:09

## 2022-04-19 RX ADMIN — BUSPIRONE HYDROCHLORIDE 5 MG: 5 TABLET ORAL at 13:35

## 2022-04-19 RX ADMIN — SODIUM CHLORIDE: 9 INJECTION, SOLUTION INTRAVENOUS at 06:50

## 2022-04-19 RX ADMIN — CARBIDOPA AND LEVODOPA 2 TABLET: 25; 100 TABLET, EXTENDED RELEASE ORAL at 09:09

## 2022-04-19 RX ADMIN — APIXABAN 5 MG: 5 TABLET, FILM COATED ORAL at 09:11

## 2022-04-19 RX ADMIN — DOCUSATE SODIUM 100 MG: 100 CAPSULE, LIQUID FILLED ORAL at 09:09

## 2022-04-19 RX ADMIN — BUSPIRONE HYDROCHLORIDE 5 MG: 5 TABLET ORAL at 09:11

## 2022-04-19 NOTE — PROGRESS NOTES
[facility] Progress Note    Admitting Date and Time: 4/14/2022  2:45 PM  Admit Dx: Acute renal insufficiency [N28.9]  New onset a-fib ([address] [I48.91]  Atrial fibrillation with RVR (HCC) [I48.91]  Acute on chronic congestive heart failure, unspecified heart failure type ([address] [I50.9]    Subjective:  Patient is being followed for Acute renal insufficiency [N28.9]  New onset a-fib ([address] [I48.91]  Atrial fibrillation with RVR ([address] [I48.91]  Acute on chronic congestive heart failure, unspecified heart failure type ([address] [I50.9]   Pt feels better. Looks relaxed. Cardiology says can be dc'd. [facility] score 10/24. Discussed about Rehab. Wants to know where. To have  give her the info. Per RN: [id number] [address] for saline lock. ROS: denies fever, chills, cp, sob, n/v, HA unless stated above.       lidocaine  1 patch TransDERmal Daily    sodium chloride flush  5-40 mL IntraVENous 2 times per day    docusate sodium  100 mg Oral Daily    metoprolol succinate  25 mg Oral BID    apixaban  5 mg Oral BID    atorvastatin  20 mg Oral Daily    busPIRone  5 mg Oral TID    insulin lispro  0-6 Units SubCUTAneous TID WC    insulin lispro  0-3 Units SubCUTAneous Nightly    Vitamin D  1,000 Units Oral Daily    doxepin  10 mg Oral Nightly    insulin glargine  10 Units SubCUTAneous Nightly    isosorbide mononitrate  30 mg Oral Daily    lamoTRIgine  200 mg Oral Daily    montelukast  10 mg Oral Nightly    pantoprazole  40 mg Oral QAM AC    rOPINIRole  1 mg Oral TID    venlafaxine  150 mg Oral Daily    sodium chloride flush  5-40 mL IntraVENous 2 times per day    Arformoterol Tartrate  15 mcg Nebulization BID    And    budesonide  0.5 mg Nebulization BID    carbidopa-levodopa  2 tablet Oral TID     sodium chloride flush, 5-40 mL, PRN  sodium chloride, 25 mL, PRN  ALPRAZolam, 0.5 mg, Daily PRN  glucose, 15 g, PRN  glucagon (rDNA), 1 mg, PRN  dextrose, 100 mL/hr, PRN  sodium chloride flush, 5-40 mL, PRN  sodium chloride, , PRN  ondansetron, 4 mg, Q8H PRN   Or  ondansetron, 4 mg, Q6H PRN  polyethylene glycol, 17 g, Daily PRN  acetaminophen, 650 mg, Q6H PRN   Or  acetaminophen, 650 mg, Q6H PRN  dextrose bolus (hypoglycemia), 125 mL, PRN   Or  dextrose bolus (hypoglycemia), 250 mL, PRN  albuterol, 2.5 mg, Q4H PRN    Objective:    /63   Pulse 74   Temp 97.9 °F (36.6 °C) (Oral)   Resp 18   Ht 5' (1.524 m)   Wt 244 lb 6.4 oz (110.9 kg)   SpO2 94%   BMI 47.73 kg/m²     General Appearance: alert and in no acute distress  Skin: warm and dry  Head: normocephalic and atraumatic  Eyes: pupils equal, round, and reactive to light, extraocular eye movements intact, conjunctivae normal  Neck: neck supple and non tender without mass   Pulmonary/Chest: clear to auscultation bilaterally- no wheezes, rales or rhonchi, normal air movement, no respiratory distress  Cardiovascular: normal rate, normal S1 and S2 and no carotid bruits  Abdomen: soft, non-tender, non-distended, normal bowel sounds, no masses or organomegaly  Extremities: no cyanosis, no clubbing and no edema  Neurologic: no cranial nerve deficit and speech normal    Recent Labs     04/17/22  0928 04/18/22  0326 04/19/22  0346    136 137   K 4.6 4.4 4.8    99 100   CO2 33* 29 29   BUN 17 17 22   CREATININE 1.3* 1.3* 1.3*   GLUCOSE 196* 158* 128*   CALCIUM 8.4* 8.5* 8.8       Recent Labs     04/18/22  0326   WBC 6.2   RBC 3.96   HGB 10.3*   HCT 35.4   MCV 89.4   MCH 26.0   MCHC 29.1*   RDW 17.0*      MPV 9.8     Radiology:  ECHO Transesophageal    Result Date: 4/18/2022  Transesophageal Echocardiography Report (PAT)   Demographics   Patient Name      Norah Manual Gender              Female                    D   Medical Record    [id number]        Room Number         0406  Number   Account #         [de-identified]       Procedure Date      04/18/2022   Corporate ID                      Ordering Physician  Brianna Erwin MD   Accession Number [id number]      Referring Physician   Date of Birth     1950      Sonographer         Ramona South Dontae Anthony   Age               66 year(s)      Interpreting        Ron Rueda MD                                    Physician                                     Any Other  Procedure Type of Study   PAT procedure:Transesophageal Echo (PAT). Procedure Date Date: 04/18/2022 Start: 10:27 AM Study Location: OR Technical Quality: Adequate visualization Patient Status: Routine Contrast Medium: Bubble Study. Height: 60 inches Weight: 244 pounds BSA: 2.03 m^2 BMI: 47.65 kg/m^2 HR: 113 bpm BP: 140/80 mmHg PAT Performed By: the attending and the sonographer  Type of Anesthesia: Moderate sedation   Findings   Left Ventricle  Normal left ventricle size and systolic function. Ejection fraction is visually estimated at 60%. No regional wall motion abnormalities seen. Right Ventricle  Normal right ventricular size and function. Left Atrium  Moderately dilated left atrium. No evidence of thrombus within left atrium or appendage. Agitated saline injected for shunt evaluation. No evidence of patent foramen ovale on bubble study. NAIF emptying velocity 18 cm/sec. Right Atrium  Mildly enlarged right atrium size. Mitral Valve  Normal mitral valve structure and function. Mild mitral regurgitation is present. No evidence of mitral valve stenosis. Tricuspid Valve  The tricuspid valve appears structurally normal.  Mild tricuspid regurgitation. RVSP is 32 mmHg. Normal estimated PA systolic pressure. Aortic Valve  Structurally normal aortic valve. The aortic valve is trileaflet. No hemodynamically significant aortic stenosis is present. No evidence of aortic valve regurgitation. Pulmonic Valve  The pulmonic valve was not well visualized. No evidence of any pulmonic regurgitation. Pericardial Effusion  No evidence for hemodynamically significant pericardial effusion.    Pleural Effusion  No evidence of pleural effusion. Aorta  Aortic root dimension within normal limits. Miscellaneous  Inferior Vena Cava not well visualized. Conclusions   Summary  Normal left ventricle size and systolic function. Ejection fraction is visually estimated at 60%. No regional wall motion abnormalities seen. Moderately dilated left atrium. No evidence of thrombus within left atrium or appendage. Agitated saline injected for shunt evaluation. No evidence of patent foramen ovale on bubble study. NAIF emptying velocity 18 cm/sec. Normal right ventricular size and function. Mild mitral regurgitation is present. No hemodynamically significant aortic stenosis is present. Mild tricuspid regurgitation. RVSP is 32 mmHg. Normal estimated PA systolic pressure. No evidence for hemodynamically significant pericardial effusion. Signature   ----------------------------------------------------------------  Electronically signed by Naheed Honeycutt MD(Interpreting  physician) on 04/18/2022 07:04 PM  ----------------------------------------------------------------  M-Mode/2D Measurements & Calculations     AO Root Dimension: 3.4 cm  Doppler Measurements & Calculations                                              Estimated RVSP: 32 mmHg                                             Estimated RAP:3 mmHg    Estimated PASP: 32.25 mmHg                TR Velocity:2.46 m/s                                             TR Gradient:24.25 mmHg  [email or web address] [id number]    CT ABDOMEN PELVIS W IV CONTRAST Additional Contrast? Oral    Result Date: 4/17/2022  EXAMINATION: CT OF THE ABDOMEN AND PELVIS WITH CONTRAST 4/17/2022 7:46 pm TECHNIQUE: CT of the abdomen and pelvis was performed with the administration of intravenous contrast. Multiplanar reformatted images are provided for review.  Dose modulation, iterative reconstruction, and/or weight based adjustment of the mA/kV was utilized to reduce the radiation dose to as low as reasonably achievable. COMPARISON: 02/04/2021. HISTORY: ORDERING SYSTEM PROVIDED HISTORY: right sided pain. low BP. On Eliquis. R/O retroperitoneal bleed TECHNOLOGIST PROVIDED HISTORY: Reason for exam:->right sided pain. low BP. On Eliquis. R/O retroperitoneal bleed Additional Contrast?->Oral FINDINGS: The lung bases demonstrate atelectasis and  infiltrates in lung bases likely CHF and or pneumonia. There is diffuse coronary artery calcification. There is hepatosplenomegaly with liver measuring 17.5 cm which is fatty and spleen measuring 15 cm. Gallbladder is absent. Pancreas, the adrenals and the kidneys are normal.  There is no retroperitoneal hematoma. Degenerative changes are identified in the lumbar spine. Pelvis. The bladder is partially contracted with mild wall thickening. Please correlate with urinalysis. Colon is partially collapsed. Appendix is partially identified. Inflammatory stranding densities are identified in the abdomen pelvis in the subcutaneous tissue more on the right lateral abdomen and pelvis. There is no retroperitoneal hematoma. Infiltrates and pleural effusion lung bases likely CHF and or pneumonia. Anasarca with inflammatory changes in the right lateral abdominal wall. RECOMMENDATIONS: Unavailable     Assessment:    Principal Problem:    New onset a-fib (HCC)  Active Problems:    Asthma    Hyperlipidemia    Obstructive sleep apnea syndrome    Diabetes mellitus ([address]    Parkinson's disease ([address]    Primary hypertension    Morbid obesity (HCC)    Acute on chronic congestive heart failure (HCC)    Coronary artery disease involving native coronary artery of native heart without angina pectoris    Dysphagia  Resolved Problems:    * No resolved hospital problems. *    Plan:  1. New onset atrial fibrillation, resolved - Had a successful Cardioversion yesterday.   On oral Toprol XL 25 mg po bid.  Eliquis for nonvalvular A. fib.  Lipitor 20 mg daily. Ok for dc per Cardiology.    2.  AZTUNZMRP -aspiration noted on modified barium swallow using a straw.  Diet changed to soft bite-size foods.  Okay for thin liquids without any straws. 3.  HTN - Continue meds.  Monitor vitals and adjust accordingly  4.  Hyperlipidemia - Continue Lipitor 20 mg daily.  Lipid panel 97/171/46/17. 5.  T2DM - last A1c March 2022 6.9%.  Glucose point-of-care therapy.  Diabetic diet.  Hypoglycemia protocol.  Resume home meds as in good control. 6.  Parkinson's Disease - stable on Sinemet.  Continue same. 7.  RIght sided abdominal pain -has hepatosplenomegaly on CAT scan. No retroperitoneal bleed. Etiology unclear. GI consult. Screening tests ordered. Mono negative. RF mildly elevated at 16 (ULN 13). Rest of labs pending. 8.  Generalized weakness - [facility] 10/24. Awaiting precert for Rehab from insurance company. NOTE: This report was transcribed using voice recognition software. Every effort was made to ensure accuracy; however, inadvertent computerized transcription errors may be present.     Electronically signed by Chris Davis MD on 4/19/2022 at 9:13 AM

## 2022-04-19 NOTE — CARE COORDINATION
4/19/2022  Social Work Discharge [place] for Pt to go to [facility]. N-17 generated, 7000 and transport form is completed. Electronically signed by ESTHER Hannon on 4/19/2022 at 9:24 AM    4/19/2022 Social Work Discharge Planning:Auth was received for Pt to go to [facility]. KRISHNA notified nurse. Will need a COVID test if she discharges today. Auth is good for 48 hrs. Electronically signed by ESTHER Hannon on 4/19/2022 at 12:09 PM    4/19/2022 Social Work Discharge Planning:KRISHNA set up ambulance transport via [facility] for Pt to go to [facility] at 5pm today. KRISHNA notified daughter, nurse here and [facility] liaison.  Electronically signed by ESTHER Hannon on 4/19/2022 at 12:47 PM

## 2022-04-19 NOTE — DISCHARGE SUMMARY
[facility] Physician Discharge Summary       CM [address] [facility], Felip[address]        Durga Donohue, [address]  [address] [facility],[address] [id number] [phone]    Call today  Call for follow up within 3-5 days of discharge    Fabian Menchaca, [address] [place] [id number]  [phone]    Schedule an appointment as soon as possible for a visit  For follow-up      Activity level: As tolerated     Dispo:SNF      Condition on discharge: Stable     Patient ID:  Doyle [facility]  [id number]  66 y.o.  1950    Admit date: 4/14/2022    Discharge date and time:  4/19/2022  1:23 PM    Admission Diagnoses: Principal Problem:    New onset a-fib [facility])  Active Problems:    Asthma    Hyperlipidemia    Obstructive sleep apnea syndrome    Diabetes mellitus ([address]    Parkinson's disease ([address]    Primary hypertension    Morbid obesity ([address]    Acute on chronic congestive heart failure ([address]    Coronary artery disease involving native coronary artery of native heart without angina pectoris    Dysphagia  Resolved Problems:    * No resolved hospital problems. *      Discharge Diagnoses: Principal Problem:    New onset a-fib ([address]  Active Problems:    Asthma    Hyperlipidemia    Obstructive sleep apnea syndrome    Diabetes mellitus ([address]    Parkinson's disease ([address]    Primary hypertension    Morbid obesity (HCC)    Acute on chronic congestive heart failure ([address]    Coronary artery disease involving native coronary artery of native heart without angina pectoris    Dysphagia  Resolved Problems:    * No resolved hospital problems.  *      Consults:  IP CONSULT TO CARDIOLOGY  IP CONSULT TO GI    Procedures:   4/18/22 - PAT/Cardioversion    Hospital Course:   Patient Doyle [facility] is a 66 y.o. presented with Acute renal insufficiency [N28.9]  New onset a-fib ([address] [I48.91]  Atrial fibrillation with RVR ([address] [I48.91]  Acute on chronic congestive heart failure, unspecified heart failure type ([facility] [I50.9] . Patient was seen by cardiology. She had adjustment of her medications. Diuresis occurred. Patient developed SHANTANU with a creatinine of 1.5. Cozaar and diuretics and aspirin was stopped. Her Toprol XL was increased to 25 twice a day. Aspirin was stopped. Eliquis was resumed. A decision was made to do a PAT/cardioversion. This occurred on 4/18/2022. The procedure was successful. Patient was seen by therapy and had an AM PAC score of 10 out of 24. She was in agreement for rehab. She was approved by her insurance for rehab at [facility]. Patient is medically stable for discharge. Medications are as listed below. Follow-up with cardiology in 3 to 5 days. Follow-up with SNF MD in 2 to 3 days.     Discharge Exam:    General Appearance: alert and oriented to person, place and time and in no acute distress  Skin: warm and dry  Head: normocephalic and atraumatic  Eyes: pupils equal, round, and reactive to light, extraocular eye movements intact, conjunctivae normal  Neck: neck supple and non tender without mass   Pulmonary/Chest: clear to auscultation bilaterally- no wheezes, rales or rhonchi, normal air movement, no respiratory distress  Cardiovascular: normal rate, normal S1 and S2 and no carotid bruits  Abdomen: soft, non-tender, non-distended, normal bowel sounds, no masses or organomegaly  Extremities: no cyanosis, no clubbing and no edema  Neurologic: no cranial nerve deficit and speech normal    LABS:  Recent Labs     04/17/22  0928 04/18/22  0326 04/19/22  0346    136 137   K 4.6 4.4 4.8    99 100   CO2 33* 29 29   BUN 17 17 22   CREATININE 1.3* 1.3* 1.3*   GLUCOSE 196* 158* 128*   CALCIUM 8.4* 8.5* 8.8       Recent Labs     04/18/22  0326   WBC 6.2   RBC 3.96   HGB 10.3*   HCT 35.4   MCV 89.4   MCH 26.0   MCHC 29.1*   RDW 17.0*      MPV 9.8     Imaging:  CT Head WO Contrast    Result Date: 4/14/2022  EXAMINATION: CT OF THE HEAD WITHOUT CONTRAST  4/14/2022 3:10 pm TECHNIQUE: CT of the head was performed without the administration of intravenous contrast. Dose modulation, iterative reconstruction, and/or weight based adjustment of the mA/kV was utilized to reduce the radiation dose to as low as reasonably achievable. COMPARISON: March 06/20/2022 HISTORY: ORDERING SYSTEM PROVIDED HISTORY: fall TECHNOLOGIST PROVIDED HISTORY: Reason for exam:->fall Has a \"code stroke\" or \"stroke alert\" been called? ->No Decision Support Exception - unselect if not a suspected or confirmed emergency medical condition->Emergency Medical Condition (MA) FINDINGS: BRAIN/VENTRICLES: There are mild age related cortical atrophy and periventricular white matter ischemic changes. There is no acute intracranial hemorrhage, mass effect or midline shift. No abnormal extra-axial fluid collection. The gray-white differentiation is maintained without evidence of an acute infarct. There is no evidence of hydrocephalus. ORBITS: The visualized portion of the orbits demonstrate no acute abnormality. SINUSES: The visualized paranasal sinuses and mastoid air cells demonstrate no acute abnormality. SOFT TISSUES/SKULL:  No acute abnormality of the visualized skull or soft tissues. No acute intracranial abnormality or hemorrhage. CT Cervical Spine WO Contrast    Result Date: 4/14/2022  EXAMINATION: CT OF THE CERVICAL SPINE WITHOUT CONTRAST 4/14/2022 3:10 pm TECHNIQUE: CT of the cervical spine was performed without the administration of intravenous contrast. Multiplanar reformatted images are provided for review. Dose modulation, iterative reconstruction, and/or weight based adjustment of the mA/kV was utilized to reduce the radiation dose to as low as reasonably achievable.  COMPARISON: March 26, 2022 HISTORY: ORDERING SYSTEM PROVIDED HISTORY: fall TECHNOLOGIST PROVIDED HISTORY: Reason for exam:->fall Decision Support Exception - unselect if not a suspected or confirmed emergency medical condition->Emergency Medical Condition (MA) FINDINGS: BONES/ALIGNMENT: There is no acute fracture or traumatic malalignment. There is straightening of normal lordotic curvature likely due to muscular spasm and/or positioning of the neck. DEGENERATIVE CHANGES: No significant degenerative changes. SOFT TISSUES: There is no prevertebral soft tissue swelling. No acute abnormality of the cervical spine. XR CHEST PORTABLE    Result Date: 4/14/2022  EXAMINATION: ONE XRAY VIEW OF THE CHEST PORTABLE UPRIGHT 4/14/2022 3:10 pm COMPARISON: 03/16/2022 HISTORY: ORDERING SYSTEM PROVIDED HISTORY: left rib pain TECHNOLOGIST PROVIDED HISTORY: Reason for exam:->left rib pain FINDINGS: Chest evaluation partly limited by portable technique and large body habitus. Mild cardiomegaly, unchanged. Cephalization of the pulmonary vascularity within the right upper lobe in keeping with mild pulmonary venous congestion. No focal infiltrate or pleural effusion. No pneumothorax. Cardiomegaly and mild pulmonary venous congestion. No pneumonia. Fluoroscopy modified barium swallow with video    Result Date: 4/15/2022  EXAMINATION: MODIFIED BARIUM SWALLOW WAS PERFORMED IN CONJUNCTION WITH SPEECH PATHOLOGY SERVICES WITH RONN UNIT DICOM DISPLAY TECHNIQUE: Fluoroscopic evaluation of the swallowing mechanism was performed using cineradiography with multiple consistency of barium product in conjunction with speech pathology services. FLUOROSCOPY DOSE AND TYPE OR TIME AND EXPOSURES: Images: Cine fluoroscopy Fluoroscopic time: 1.4 minutes Total dose: 26.67 mGy COMPARISON: None HISTORY: ORDERING SYSTEM PROVIDED HISTORY: difficulty swallowing TECHNOLOGIST PROVIDED HISTORY: Reason for exam:->difficulty swallowing FINDINGS: Oral phase mild swallowing delay. No significant barium residuals.  Satisfactory pharyngeal phase of the swallow with the exception of aspiration of thin liquid barium when using a straw. Swallows were otherwise satisfactory. In     Thin liquid aspiration when using a straw. Otherwise satisfactory swallowing mechanism. Please see separate speech pathology report for full discussion of findings and recommendations. Patient Instructions:      Medication List      START taking these medications    apixaban 5 MG Tabs tablet  Commonly known as: ELIQUIS  Take 1 tablet by mouth 2 times daily     lidocaine 4 % external patch  Place 1 patch onto the skin daily Leave on for 12 hrs. Take off for 12 hrs.   Start taking on: April 20, 2022        CONTINUE taking these medications    Accu-Chek Mary Plus strip  Generic drug: blood glucose test strips     albuterol sulfate  (90 Base) MCG/ACT inhaler  Commonly known as: Proventil HFA  Inhale 2 puffs into the lungs every 6 hours as needed for Wheezing     ALPRAZolam 0.5 MG tablet  Commonly known as: XANAX     atorvastatin 20 MG tablet  Commonly known as: LIPITOR  Take 1 tablet by mouth daily     budesonide-formoterol 160-4.5 MCG/ACT Aero  Commonly known as: Symbicort  Inhale 2 puffs into the lungs 2 times daily     busPIRone 5 MG tablet  Commonly known as: BUSPAR  Take 1 tablet by mouth 3 times daily     carbidopa-levodopa  MG per extended release tablet  Commonly known as: Sinemet CR  Take 1 tablet by mouth 3 times daily     doxepin 10 MG capsule  Commonly known as: SINEQUAN     insulin aspart 100 UNIT/ML injection pen  Commonly known as: NovoLOG FlexPen  INJECT 0-10 UNITS INTO THE SKIN THREE TIMES DAILY(BEFORE MEALS) SLIDING SCALE (MAX DAILY 30 UNITS)     insulin glargine 100 UNIT/ML injection vial  Commonly known as: LANTUS  Inject 10 Units into the skin nightly     ipratropium-albuterol 0.5-2.5 (3) MG/3ML Soln nebulizer solution  Commonly known as: DUONEB  Inhale 3 mLs into the lungs every 4 hours as needed for Shortness of Breath     isosorbide mononitrate 30 MG extended release tablet  Commonly known as: IMDUR  Take 1 tablet by mouth daily     lamoTRIgine 200 MG tablet  Commonly known as: LAMICTAL     metoprolol succinate 25 MG extended release tablet  Commonly known as: TOPROL XL  Take 1 tablet by mouth daily     montelukast 10 MG tablet  Commonly known as: SINGULAIR     nitroGLYCERIN 0.4 MG SL tablet  Commonly known as: NITROSTAT  up to max of 3 total doses. If no relief after 1 dose, call 911.      omeprazole 40 MG delayed release capsule  Commonly known as: PRILOSEC     rOPINIRole 1 MG tablet  Commonly known as: REQUIP  Take 1 tablet by mouth 3 times daily     venlafaxine 150 MG extended release capsule  Commonly known as: EFFEXOR XR     Vitamin D3 25 MCG Tabs  Take 1 tablet by mouth daily        STOP taking these medications    amLODIPine 10 MG tablet  Commonly known as: NORVASC     aspirin 81 MG tablet     losartan 50 MG tablet  Commonly known as: COZAAR     meclizine 25 MG tablet  Commonly known as: ANTIVERT     multivitamin Tabs tablet           Where to Get Your Medications      These medications were sent to [place] [address] [phone] [address]    Phone: [phone]   · apixaban 5 MG Tabs tablet  · lidocaine 4 % external patch       Total discharge time:  35 minutes    Note that more than 30 minutes was spent in preparing discharge papers, discussing discharge with patient, medication review, etc.    Signed:  Electronically signed by Rocio Valenzuela MD on 4/19/2022 at 1:23 PM

## 2022-04-19 NOTE — PLAN OF CARE
Patient's chart updated to reflect:      . - HF care plan, HF education points and HF discharge instructions.  -Orders: 2 gram sodium diet, daily weights, I/O.  -PCP and/or Cardiologist appointment to be scheduled within 7 days of hospital discharge.  -History of HF, not primary admission Dx.   Patient admitted for treatment of New onset A Zander Hager RN BSN  Heart Failure Navigator

## 2022-04-19 NOTE — PROGRESS NOTES
PROGRESS NOTE        Patient Presents with/Seen in Consultation For      *hepatosplenomegaly  CHIEF COMPLAINT:  Dizziness and fall  Subjective:     Patient seen Savilla Severe in bed in no apparent distress. Denies GI complaints. Tolerating diet. No BM today. Plan of care discussed with patient, verbalized and understanding. Review of Systems  Aside from what was mentioned in the PMH and HPI, essentially unremarkable, all others negative. Objective:     /63   Pulse 74   Temp 97.9 °F (36.6 °C) (Oral)   Resp 18   Ht 5' (1.524 m)   Wt 244 lb 6.4 oz (110.9 kg)   SpO2 94%   BMI 47.73 kg/m²     General appearance: alert, awake, laying in bed, and cooperative  Eyes: conjunctiva pale, sclera anicteric. PERRL.   Lungs: clear to auscultation bilaterally  Heart: regular rate and rhythm, no murmur, 2+ pulses; no edema  Abdomen: soft, non-tender; bowel sounds normal; no masses,  no organomegaly however difficult to discern due to gross obesity  Extremities: extremities without edema  Pulses: 2+ and symmetric  Skin: Skin color, texture, turgor normal.   Neurologic: Grossly normal    lidocaine 4 % external patch 1 patch, Daily  sodium chloride flush 0.9 % injection 5-40 mL, 2 times per day  sodium chloride flush 0.9 % injection 5-40 mL, PRN  0.9 % sodium chloride infusion, PRN  docusate sodium (COLACE) capsule 100 mg, Daily  metoprolol succinate (TOPROL XL) extended release tablet 25 mg, BID  apixaban (ELIQUIS) tablet 5 mg, BID  ALPRAZolam (XANAX) tablet 0.5 mg, Daily PRN  atorvastatin (LIPITOR) tablet 20 mg, Daily  busPIRone (BUSPAR) tablet 5 mg, TID  insulin lispro (HUMALOG) injection vial 0-6 Units, TID WC  insulin lispro (HUMALOG) injection vial 0-3 Units, Nightly  glucose (GLUTOSE) 40 % oral gel 15 g, PRN  glucagon (rDNA) injection 1 mg, PRN  dextrose 5 % solution, PRN  Vitamin D (CHOLECALCIFEROL) tablet 1,000 Units, Daily  doxepin (SINEQUAN) capsule 10 mg, Nightly  insulin glargine (LANTUS) injection vial 10 Units, Nightly  isosorbide mononitrate (IMDUR) extended release tablet 30 mg, Daily  lamoTRIgine (LAMICTAL) tablet 200 mg, Daily  montelukast (SINGULAIR) tablet 10 mg, Nightly  pantoprazole (PROTONIX) tablet 40 mg, QAM AC  rOPINIRole (REQUIP) tablet 1 mg, TID  venlafaxine (EFFEXOR XR) extended release capsule 150 mg, Daily  sodium chloride flush 0.9 % injection 5-40 mL, 2 times per day  sodium chloride flush 0.9 % injection 5-40 mL, PRN  0.9 % sodium chloride infusion, PRN  ondansetron (ZOFRAN-ODT) disintegrating tablet 4 mg, Q8H PRN   Or  ondansetron (ZOFRAN) injection 4 mg, Q6H PRN  polyethylene glycol (GLYCOLAX) packet 17 g, Daily PRN  acetaminophen (TYLENOL) tablet 650 mg, Q6H PRN   Or  acetaminophen (TYLENOL) suppository 650 mg, Q6H PRN  dextrose bolus (hypoglycemia) 10% 125 mL, PRN   Or  dextrose bolus (hypoglycemia) 10% 250 mL, PRN  albuterol (PROVENTIL) nebulizer solution 2.5 mg, Q4H PRN  Arformoterol Tartrate (BROVANA) nebulizer solution 15 mcg, BID   And  budesonide (PULMICORT) nebulizer suspension 500 mcg, BID  carbidopa-levodopa (SINEMET CR)  MG per extended release tablet 2 tablet, TID         Data Review  CBC:   Lab Results   Component Value Date    WBC 6.2 04/18/2022    RBC 3.96 04/18/2022    HGB 10.3 04/18/2022    HCT 35.4 04/18/2022    MCV 89.4 04/18/2022    MCH 26.0 04/18/2022    MCHC 29.1 04/18/2022    RDW 17.0 04/18/2022     04/18/2022    MPV 9.8 04/18/2022     CMP:    Lab Results   Component Value Date     04/19/2022    K 4.8 04/19/2022    K 4.0 04/14/2022     04/19/2022    CO2 29 04/19/2022    BUN 22 04/19/2022    CREATININE 1.3 04/19/2022    GFRAA 49 04/19/2022    LABGLOM 40 04/19/2022    GLUCOSE 128 04/19/2022    PROT 6.3 04/15/2022    LABALBU 3.9 04/15/2022    CALCIUM 8.8 04/19/2022    BILITOT 0.5 04/15/2022    ALKPHOS 110 04/15/2022    AST 8 04/15/2022    ALT <5 04/15/2022     Hepatic Function Panel:    Lab Results   Component Value Date    ALKPHOS 110 04/15/2022 ALT <5 04/15/2022    AST 8 04/15/2022    PROT 6.3 04/15/2022    BILITOT 0.5 04/15/2022    BILIDIR 0.2 07/20/2013    LABALBU 3.9 04/15/2022     No components found for: CHLPL    Lab Results   Component Value Date    TRIG 171 (H) 04/15/2022    TRIG 134 03/18/2022    TRIG 144 10/25/2021       Lab Results   Component Value Date    HDL 46 04/15/2022    HDL 50 03/18/2022    HDL 59 10/25/2021       Lab Results   Component Value Date    LDLCALC 17 04/15/2022    LDLCALC 69 03/18/2022    LDLCALC 60 10/25/2021       Lab Results   Component Value Date    LABVLDL 34 04/15/2022    LABVLDL 27 03/18/2022    LABVLDL 29 10/25/2021      PT/INR:    Lab Results   Component Value Date    PROTIME 13.9 04/15/2022    INR 1.3 04/15/2022         Assessment:     Active problems:  ? Hepatosplenomegaly, likely secondary to ESPINOZA  ? CHF- cardiology following   ? A fib on Eliquis- cardiology following   ? Anemia, normocytic   ? CKD  ? DM  ? Morbid obesity       Plan:     ? Hepatitis panel negative  ? Serology negative thus far others still pending  ? Diet as tolerated  ? Medical management per PCP  ? Supportive care  ? Discharge when medically stable and ok with others per PCP, ok from GI POV with outpatient follow up visit, will need to schedule endoscopic work up as outpatient      Note: This report was completed utilizing computer voice recognition software. Every effort has been made to ensure accuracy, however; inadvertent computerized transcription errors may be present.      Discussed with Dr. Meryle Asters per Dr. Romina Montez APRN-NP-C 4/19/2022  12:31 PM For Dr. Jessy Mcqueen

## 2022-04-19 NOTE — PROGRESS NOTES
INPATIENT CARDIOLOGY FOLLOW-UP    Name: Mary Camacho    Age: 66 y.o. Date of Admission: 4/14/2022  2:45 PM    Date of Service: 4/19/2022    Chief Complaint: Follow-up  for new onset atrial fibrillation. Interim History:  No new overnight cardiac complaints and feeling much better after undergoing cardioversion. . Currently with no complaints of CP, palpitations, dizziness, or lightheadedness. BS on telemetry. Review of Systems:   Cardiac: As per HPI  General: No fever, chills  Pulmonary: As per HPI  HEENT: No visual disturbances, difficult swallowing  GI: No nausea, vomiting  Endocrine: No thyroid disease or DM  Musculoskeletal: HDZ x 4, no focal motor deficits  Skin: Intact, no rashes  Neuro/Psych: No headache or seizures    Problem List:  Patient Active Problem List   Diagnosis    Depression with anxiety    Osteoporosis    Asthma    Hyperlipidemia    Mitral regurgitation    Obstructive sleep apnea syndrome    Psoriasis    Diabetes mellitus ([address]    Parkinson's disease ([address]    Primary hypertension    Microalbuminuria    Morbid obesity ([address]    RLS (restless legs syndrome)    Generalized weakness    Inability to walk    Hypothyroidism    Chest pain    Acute asthma exacerbation    Asthma exacerbation, mild    New onset a-fib (HCC)    Atrial fibrillation with RVR (HCC)    Acute on chronic congestive heart failure ([address]    Coronary artery disease involving native coronary artery of native heart without angina pectoris    Dysphagia       Allergies:   Allergies   Allergen Reactions    Ciprofloxacin Nausea And Vomiting    Codeine Itching     Creepy crawly \" feelings       Current Medications:  Current Facility-Administered Medications   Medication Dose Route Frequency Provider Last Rate Last Admin    0.9 % sodium chloride infusion   IntraVENous Continuous Navi Joyce MD 75 mL/hr at 04/18/22 0848 New Bag at 04/18/22 0848    lidocaine 4 % external patch 1 patch  1 patch TransDERmal Daily Cheryl Ryan MD   1 patch at 04/18/22 1413    sodium chloride flush 0.9 % injection 5-40 mL  5-40 mL IntraVENous 2 times per day Brooke Portillo MD   10 mL at 04/18/22 2128    sodium chloride flush 0.9 % injection 5-40 mL  5-40 mL IntraVENous PRN Brooke Portillo MD        0.9 % sodium chloride infusion  25 mL IntraVENous PRN Brooke Portillo MD        docusate sodium (COLACE) capsule 100 mg  100 mg Oral Daily RASHARD Colunga CNP   100 mg at 04/17/22 0913    metoprolol succinate (TOPROL XL) extended release tablet 25 mg  25 mg Oral BID Everrett BeeRASHARD correia - CNS   25 mg at 04/18/22 2127    apixaban (ELIQUIS) tablet 5 mg  5 mg Oral BID Leandrorett RASHARD Clancy - CNS   5 mg at 04/18/22 2128    ALPRAZolam (XANAX) tablet 0.5 mg  0.5 mg Oral Daily PRN RASHARD Colunga CNP   0.5 mg at 04/16/22 1234    atorvastatin (LIPITOR) tablet 20 mg  20 mg Oral Daily RASHARD Colunga CNP   20 mg at 04/18/22 2127    busPIRone (BUSPAR) tablet 5 mg  5 mg Oral TID RASHARD Colunga CNP   5 mg at 04/18/22 2127    insulin lispro (HUMALOG) injection vial 0-6 Units  0-6 Units SubCUTAneous TID WC RASHARD Colunga CNP   1 Units at 04/18/22 1645    insulin lispro (HUMALOG) injection vial 0-3 Units  0-3 Units SubCUTAneous Nightly RASHARD Colunga CNP   1 Units at 04/18/22 2135    glucose (GLUTOSE) 40 % oral gel 15 g  15 g Oral PRN RASHARD Colunga CNP        glucagon (rDNA) injection 1 mg  1 mg IntraMUSCular PRN RASHARD Colunga CNP        dextrose 5 % solution  100 mL/hr IntraVENous PRN RASHARD Colunga CNP        Vitamin D (CHOLECALCIFEROL) tablet 1,000 Units  1,000 Units Oral Daily RASHARD Colunga CNP   1,000 Units at 04/18/22 0830    doxepin (SINEQUAN) capsule 10 mg  10 mg Oral Nightly RASHARD Colunga CNP   10 mg at 04/18/22 2128    insulin glargine (LANTUS) injection vial 10 Units  10 Units SubCUTAneous Nightly Dulce Cifuentes Gucci Coma - CNP   10 Units at 04/18/22 2136    isosorbide mononitrate (IMDUR) extended release tablet 30 mg  30 mg Oral Daily Harshal Alex, APRN - CNP   30 mg at 04/18/22 0830    lamoTRIgine (LAMICTAL) tablet 200 mg  200 mg Oral Daily Harshal Alex, APRN - CNP   200 mg at 04/18/22 0830    montelukast (SINGULAIR) tablet 10 mg  10 mg Oral Nightly Harshalmasood Johnson, APRN - CNP   10 mg at 04/18/22 2127    pantoprazole (PROTONIX) tablet 40 mg  40 mg Oral QAM AC Harshal Johnson, APRN - CNP   40 mg at 04/18/22 0525    rOPINIRole (REQUIP) tablet 1 mg  1 mg Oral TID Harshal Johnson, APRN - CNP   1 mg at 04/18/22 2127    venlafaxine (EFFEXOR XR) extended release capsule 150 mg  150 mg Oral Daily Harshal Alex, APRN - CNP   150 mg at 04/18/22 5743    sodium chloride flush 0.9 % injection 5-40 mL  5-40 mL IntraVENous 2 times per day Harshal Johnson, APRN - CNP   10 mL at 04/17/22 2051    sodium chloride flush 0.9 % injection 5-40 mL  5-40 mL IntraVENous PRN Harshal Johnson, APRN - CNP        0.9 % sodium chloride infusion   IntraVENous PRN Harshal Alex, APRN - CNP        ondansetron (ZOFRAN-ODT) disintegrating tablet 4 mg  4 mg Oral Q8H PRN Harshal Alex, APRN - CNP        Or    ondansetron [facility]) injection 4 mg  4 mg IntraVENous Q6H PRN Harshal Alex, APRN - CNP   4 mg at 04/16/22 1206    polyethylene glycol (GLYCOLAX) packet 17 g  17 g Oral Daily PRN Harshal Alex, APRN - CNP        acetaminophen (TYLENOL) tablet 650 mg  650 mg Oral Q6H PRN Harshal Alex, APRN - CNP        Or    acetaminophen (TYLENOL) suppository 650 mg  650 mg Rectal Q6H PRN Harshal Alex, APRN - CNP        dextrose bolus (hypoglycemia) 10% 125 mL  125 mL IntraVENous PRN Harshal Alex, APRN - CNP        Or    dextrose bolus (hypoglycemia) 10% 250 mL  250 mL IntraVENous PRN Harshal Alex, APRN - CNP        albuterol (PROVENTIL) nebulizer solution 2.5 mg  2.5 mg Nebulization Q4H PRN Harshal Johnson 8.4* 8.5* 8.8     Lab Results   Component Value Date    MG 2.0 04/15/2022     No results for input(s): ALKPHOS, ALT, AST, PROT, BILITOT, BILIDIR, LABALBU in the last 72 hours. Recent Labs     04/18/22  0326   WBC 6.2   RBC 3.96   HGB 10.3*   HCT 35.4   MCV 89.4   MCH 26.0   MCHC 29.1*   RDW 17.0*      MPV 9.8     Lab Results   Component Value Date    CKTOTAL 32 04/25/2014    CKMB 2.1 04/25/2014    TROPONINI <0.01 10/26/2020    TROPONINI <0.01 08/24/2019    TROPONINI <0.01 05/29/2019     Lab Results   Component Value Date    INR 1.3 04/15/2022    INR 1.2 02/09/2021    INR 1.4 02/04/2021    PROTIME 13.9 (H) 04/15/2022    PROTIME 13.5 (H) 02/09/2021    PROTIME 15.8 (H) 02/04/2021     Lab Results   Component Value Date    TSH 1.030 04/14/2022     Lab Results   Component Value Date    LABA1C 6.9 (H) 03/16/2022     No results found for: EAG  Lab Results   Component Value Date    CHOL 97 04/15/2022    CHOL 146 03/18/2022    CHOL 148 10/25/2021     Lab Results   Component Value Date    TRIG 171 (H) 04/15/2022    TRIG 134 03/18/2022    TRIG 144 10/25/2021     Lab Results   Component Value Date    HDL 46 04/15/2022    HDL 50 03/18/2022    HDL 59 10/25/2021     Lab Results   Component Value Date    LDLCALC 17 04/15/2022    LDLCALC 69 03/18/2022    LDLCALC 60 10/25/2021     Lab Results   Component Value Date    LABVLDL 34 04/15/2022    LABVLDL 27 03/18/2022    LABVLDL 29 10/25/2021    VLDL 84 09/19/2017     Lab Results   Component Value Date    CHOLHDLRATIO 3.3 03/13/2019    CHOLHDLRATIO 3.8 12/11/2018    CHOLHDLRATIO 3.0 09/04/2018       Cardiac Tests:    Telemetry findings reviewed: Sinus bradycardia with heart rate in the 50s while sleeping and 60s while awake    Vitals and labs were reviewed: Blood pressure 125/78 heart rate 77 sats 94%    Labs-BUN/creatinine 17/1.3>> 22/1.3    1.  C08/10/11 Lexiscan MPS ([facility]):  Normal Lexiscan portion.  No evidence for inducible ischemia.  No previous myocardial infarction.  Ejection fraction 77%. 2. Lexiscan MPS 04/25/2014: Reversible inferolateral wall perfusion defect. Finding suggests left ventricular myocardium at risk for stress-induced ischemia. EF >70%. 3. TTE 04/27/2014 (Dr. Karel Ramsay left ventricle size and systolic function. No wall motion abnormalities. Mild concentric left ventricular hypertrophy. Ejection fraction is visually estimated at >55%. Normal right ventricular size and function. Aortic root is sclerotic and calcified  4. Cardiac Catheterization (Dr. [facility]) 04/28/2014: Left main: Fredda Friendly left main artery is a short vessel which did not appear to have any significant angiographic disease. Left anterior descending:  The left anterior descending artery is a moderate-sized vessel which has minor luminal irregularities in its middle segment but without any significant angiographic luminal narrowing.  The diagonal branches also did not appear to have any significant disease. Left circumflex:  The left circumflex is a large, codominant vessel which did not appear to have any significant angiographic disease. Right coronary artery:  The right coronary artery is a moderate-sized codominant vessel which did not appear to have any significant angiographic disease. LEFT VENTRICULOGRAPHY:  The left ventricle is normal in size and contractility with an estimated ejection fraction of 60% to 65%.  There was no mitral regurgitation. RIGHT FEMORAL ARTERY ANGIOGRAPHY:  The right common femoral artery and the proximal segments of the right superficial femoral artery and the right profunda did not appear to have any significant disease.   5. TTE 02/07/2021 (Dr. Deja Astorga left ventricle size and systolic function. Ejection fraction is visually estimated at 65-70%. No regional wall motion abnormalities seen. Moderate concentric left ventricular hypertrophy. Normal right ventricular size and function. No systolic mitral regurgitation noted.  No hemodynamically significant aortic stenosis is present. Unable to estimate PA systolic pressure. No evidence for hemodynamically significant pericardial effusion. Valves were not well visualized, please consider PAT if clinical suspicion for endocarditis is high  6. Lexiscan MPS 01/10/2022: ECG during the infusion did not change. The myocardial perfusion imaging was abnormal. The abnormality was a large sized, moderate fixed defect involving the inferior and inferolateral wall suggestive prior infarct without any reversibility. There was also a small sized mild perfusion defect involving the mid to distal anterior wall suggestive ischemia. Overall left ventricular systolic function was normal without regional wall motion abnormalities. No transient ischemic dilatation. High risk general pharmacologic stress test.  7. Cardiac catheterization 1/11/2022: CONCLUSIONS:  Coronary artery disease: Left main.  No significant disease. LAD. Shelagh Birkenhead calcified proximal and mid vessel with 50% mid vessel stenosis.  60% proximal stenosis of a moderate size first diagonal branch.  LCX.  50% ostial narrowing of the AV groove branch in the mid vessel which is a bifurcation lesion with a large marginal branch which itself did not appear to have any significant disease.  The AV groove branch of the left circumflex continues to provide a posterior descending artery branch and the posterolateral branch (codominant vessel). RCA.  Codominant vessel with no significant angiographic disease. Normal left ventricular size with hyperdynamic left ventricular systolic function with an estimated ejection fraction of 75%. Systemic hypertension.  Elevated left ventricular end-diastolic [id number] (39)  8.   Echocardiogram 1/11/2022:  Left ventricle is normal in size.  Moderate concentric left ventricular hypertrophy.   No regional wall motion abnormalities seen.  Ejection fraction is visually estimated at 70+/-5%.  There is doppler evidence of stage I diastolic dysfunction.  Mildly dilated right ventricle.  Right ventricle global systolic function is normal ( TAPSE = 1.8 ). Normal size atria.  No significant valvular abnormalities noted. ASSESSMENT:  Impression:   · New onset atrial fibrillation, unknown duration status post PAT guided cardioversion to normal sinus rhythm and remains in normal sinus rhythm. · [id number] 5 on Eliquis for anticoagulation  · Mild acute on chronic HFpEF, improved  · Moderate CAD  · Morbid obesity  · Untreated sleep apnea  · Hemodynamically stable blood pressure 122/61  · Mild anemia hemoglobin 10.3    Plan:   · Continue Toprol-XL 25 mg p.o. twice daily and Eliquis 5 mg p.o. twice daily for anticoagulation  · Sleep study as an outpatient  · Continue statin therapy with atorvastatin 20 mg p.o. daily  · Rest as per primary service  · She is stable from the cardiology standpoint for discharge, will sign off, please call us if having further questions or concerns. · Follow up with Dr. Celine Khanna in one month     Angy Rosado MD., [facility], [address]   [facility]) Cardiology

## 2022-04-19 NOTE — PLAN OF CARE
Problem: Falls - Risk of:  Goal: Absence of physical injury  Description: Absence of physical injury  Outcome: Met This Shift     Problem: Coping:  Goal: Level of anxiety will decrease  Description: Level of anxiety will decrease  Outcome: Ongoing  Goal: General experience of comfort will improve  Description: General experience of comfort will improve  Outcome: Ongoing     Problem: Health Behavior:  Goal: Ability to manage health-related needs will improve  Description: Ability to manage health-related needs will improve  Outcome: Ongoing

## 2022-04-19 NOTE — PROGRESS NOTES
SPEECH LANGUAGE PATHOLOGY  DAILY PROGRESS NOTE        PATIENT NAME:  Marvin Grider      :  1950          TODAY'S DATE:  2022 ROOM:  [facility]/040-A    SWALLOWING  Patient was seen for dysphagia therapy at this date. Nurse reports good toleration of meals. Patient completed BOTR exercises with fair demonstration. Effortful Swallows were also completed with fair demonstration. Patient required moderate cues to complete. SLP student reviewed compensatory strategies with patient and she verbally agreed that she utilizes them during PO intake. Noon meal was delivered during session. Patient had good toleration of food/liquids with no overt s/s of aspiration. DYSPHAGIA DIAGNOSIS:  mild-moderate oropharyngeal phase dysphagia      DIET RECOMMENDATIONS:  Minced and moist consistency solids (IDDSI level 5) with thin liquids (IDDSI level 0)     FEEDING RECOMMENDATIONS:               Assistance level:  Stand by assistance is needed during all oral intake, Encourage self-feeding                 Compensatory strategies recommended: Small bites/sips, Alternate solids and liquids, Check for oral pocketing and No straw                                   Will continue SP intervention as per previously established POC. CPT code(s) 28643  dysphagia tx  Total minutes :  20 minutes           ONEIL Shelley  Graduate Clinician    Gutierrez SWEET CCC/SLP [id number]  Speech Pathologist

## 2022-04-20 LAB — CERULOPLASMIN: 31 MG/DL (ref 16–45)

## 2022-04-21 LAB
ANTI-NUCLEAR ANTIBODY (ANA): NEGATIVE
EPSTEIN-BARR VCA IGM: 20.9 U/ML (ref 0–43.9)
MITOCHONDRIAL M2 AB, IGG: <0.5 U/ML (ref 0–4)

## 2022-05-11 ENCOUNTER — TELEPHONE (OUTPATIENT)
Dept: OTHER | Facility: CLINIC | Age: 72
End: 2022-05-11

## 2022-05-11 ENCOUNTER — HOSPITAL ENCOUNTER (INPATIENT)
Age: 72
LOS: 6 days | Discharge: NURSING, REHAB OR LONG-TERM CARE | DRG: 291 | End: 2022-05-17
Attending: STUDENT IN AN ORGANIZED HEALTH CARE EDUCATION/TRAINING PROGRAM | Admitting: FAMILY MEDICINE
Payer: MEDICARE

## 2022-05-11 ENCOUNTER — APPOINTMENT (OUTPATIENT)
Dept: GENERAL RADIOLOGY | Age: 72
DRG: 291 | End: 2022-05-11
Payer: MEDICARE

## 2022-05-11 DIAGNOSIS — G20 PARKINSON'S DISEASE (HCC): Primary | ICD-10-CM

## 2022-05-11 DIAGNOSIS — N18.9 CHRONIC KIDNEY DISEASE, UNSPECIFIED CKD STAGE: ICD-10-CM

## 2022-05-11 DIAGNOSIS — I50.9 ACUTE ON CHRONIC CONGESTIVE HEART FAILURE, UNSPECIFIED HEART FAILURE TYPE (HCC): ICD-10-CM

## 2022-05-11 DIAGNOSIS — I48.20 CHRONIC ATRIAL FIBRILLATION (HCC): ICD-10-CM

## 2022-05-11 DIAGNOSIS — R00.1 BRADYCARDIA: ICD-10-CM

## 2022-05-11 LAB
ACETAMINOPHEN LEVEL: <5 MCG/ML (ref 10–30)
ALBUMIN SERPL-MCNC: 4.2 G/DL (ref 3.5–5.2)
ALP BLD-CCNC: 124 U/L (ref 35–104)
ALT SERPL-CCNC: <5 U/L (ref 0–32)
AMPHETAMINE SCREEN, URINE: NOT DETECTED
ANION GAP SERPL CALCULATED.3IONS-SCNC: 10 MMOL/L (ref 7–16)
AST SERPL-CCNC: 8 U/L (ref 0–31)
BACTERIA: ABNORMAL /HPF
BARBITURATE SCREEN URINE: NOT DETECTED
BASOPHILS ABSOLUTE: 0.03 E9/L (ref 0–0.2)
BASOPHILS RELATIVE PERCENT: 0.5 % (ref 0–2)
BENZODIAZEPINE SCREEN, URINE: NOT DETECTED
BILIRUB SERPL-MCNC: 0.6 MG/DL (ref 0–1.2)
BILIRUBIN URINE: NEGATIVE
BLOOD, URINE: NEGATIVE
BUN BLDV-MCNC: 24 MG/DL (ref 6–23)
CALCIUM SERPL-MCNC: 9.1 MG/DL (ref 8.6–10.2)
CANNABINOID SCREEN URINE: NOT DETECTED
CHLORIDE BLD-SCNC: 99 MMOL/L (ref 98–107)
CLARITY: CLEAR
CO2: 30 MMOL/L (ref 22–29)
COCAINE METABOLITE SCREEN URINE: NOT DETECTED
COLOR: YELLOW
CREAT SERPL-MCNC: 1.3 MG/DL (ref 0.5–1)
EOSINOPHILS ABSOLUTE: 0.05 E9/L (ref 0.05–0.5)
EOSINOPHILS RELATIVE PERCENT: 0.9 % (ref 0–6)
EPITHELIAL CELLS, UA: ABNORMAL /HPF
ETHANOL: <10 MG/DL (ref 0–0.08)
FENTANYL SCREEN, URINE: NOT DETECTED
GFR AFRICAN AMERICAN: 49
GFR NON-AFRICAN AMERICAN: 40 ML/MIN/1.73
GLUCOSE BLD-MCNC: 165 MG/DL (ref 74–99)
GLUCOSE URINE: NEGATIVE MG/DL
HCT VFR BLD CALC: 31.9 % (ref 34–48)
HEMOGLOBIN: 9.3 G/DL (ref 11.5–15.5)
IMMATURE GRANULOCYTES #: 0.02 E9/L
IMMATURE GRANULOCYTES %: 0.4 % (ref 0–5)
INFLUENZA A: NOT DETECTED
INFLUENZA B: NOT DETECTED
KETONES, URINE: NEGATIVE MG/DL
LEUKOCYTE ESTERASE, URINE: ABNORMAL
LYMPHOCYTES ABSOLUTE: 0.92 E9/L (ref 1.5–4)
LYMPHOCYTES RELATIVE PERCENT: 16.1 % (ref 20–42)
Lab: NORMAL
MCH RBC QN AUTO: 25.8 PG (ref 26–35)
MCHC RBC AUTO-ENTMCNC: 29.2 % (ref 32–34.5)
MCV RBC AUTO: 88.4 FL (ref 80–99.9)
METHADONE SCREEN, URINE: NOT DETECTED
MONOCYTES ABSOLUTE: 0.27 E9/L (ref 0.1–0.95)
MONOCYTES RELATIVE PERCENT: 4.7 % (ref 2–12)
NEUTROPHILS ABSOLUTE: 4.42 E9/L (ref 1.8–7.3)
NEUTROPHILS RELATIVE PERCENT: 77.4 % (ref 43–80)
NITRITE, URINE: NEGATIVE
OPIATE SCREEN URINE: NOT DETECTED
OXYCODONE URINE: NOT DETECTED
PDW BLD-RTO: 17.2 FL (ref 11.5–15)
PH UA: 6 (ref 5–9)
PHENCYCLIDINE SCREEN URINE: NOT DETECTED
PLATELET # BLD: 193 E9/L (ref 130–450)
PMV BLD AUTO: 10.5 FL (ref 7–12)
POTASSIUM REFLEX MAGNESIUM: 4.5 MMOL/L (ref 3.5–5)
PRO-BNP: 7325 PG/ML (ref 0–125)
PROTEIN UA: 30 MG/DL
RBC # BLD: 3.61 E12/L (ref 3.5–5.5)
RBC UA: ABNORMAL /HPF (ref 0–2)
SALICYLATE, SERUM: <0.3 MG/DL (ref 0–30)
SARS-COV-2 RNA, RT PCR: NOT DETECTED
SODIUM BLD-SCNC: 139 MMOL/L (ref 132–146)
SPECIFIC GRAVITY UA: 1.01 (ref 1–1.03)
TOTAL PROTEIN: 6.2 G/DL (ref 6.4–8.3)
TRICYCLIC ANTIDEPRESSANTS SCREEN SERUM: NEGATIVE NG/ML
TROPONIN, HIGH SENSITIVITY: 20 NG/L (ref 0–9)
TROPONIN, HIGH SENSITIVITY: 21 NG/L (ref 0–9)
UROBILINOGEN, URINE: 0.2 E.U./DL
WBC # BLD: 5.7 E9/L (ref 4.5–11.5)
WBC UA: ABNORMAL /HPF (ref 0–5)

## 2022-05-11 PROCEDURE — 85025 COMPLETE CBC W/AUTO DIFF WBC: CPT

## 2022-05-11 PROCEDURE — 6360000002 HC RX W HCPCS: Performed by: STUDENT IN AN ORGANIZED HEALTH CARE EDUCATION/TRAINING PROGRAM

## 2022-05-11 PROCEDURE — 87636 SARSCOV2 & INF A&B AMP PRB: CPT

## 2022-05-11 PROCEDURE — 80179 DRUG ASSAY SALICYLATE: CPT

## 2022-05-11 PROCEDURE — 82077 ASSAY SPEC XCP UR&BREATH IA: CPT

## 2022-05-11 PROCEDURE — 99285 EMERGENCY DEPT VISIT HI MDM: CPT

## 2022-05-11 PROCEDURE — 84484 ASSAY OF TROPONIN QUANT: CPT

## 2022-05-11 PROCEDURE — 80307 DRUG TEST PRSMV CHEM ANLYZR: CPT

## 2022-05-11 PROCEDURE — 80143 DRUG ASSAY ACETAMINOPHEN: CPT

## 2022-05-11 PROCEDURE — 2140000000 HC CCU INTERMEDIATE R&B

## 2022-05-11 PROCEDURE — 81001 URINALYSIS AUTO W/SCOPE: CPT

## 2022-05-11 PROCEDURE — 71045 X-RAY EXAM CHEST 1 VIEW: CPT

## 2022-05-11 PROCEDURE — 36415 COLL VENOUS BLD VENIPUNCTURE: CPT

## 2022-05-11 PROCEDURE — 83880 ASSAY OF NATRIURETIC PEPTIDE: CPT

## 2022-05-11 PROCEDURE — 80053 COMPREHEN METABOLIC PANEL: CPT

## 2022-05-11 RX ORDER — SODIUM CHLORIDE 0.9 % (FLUSH) 0.9 %
5-40 SYRINGE (ML) INJECTION PRN
Status: DISCONTINUED | OUTPATIENT
Start: 2022-05-11 | End: 2022-05-17 | Stop reason: HOSPADM

## 2022-05-11 RX ORDER — ONDANSETRON 4 MG/1
4 TABLET, ORALLY DISINTEGRATING ORAL EVERY 8 HOURS PRN
Status: DISCONTINUED | OUTPATIENT
Start: 2022-05-11 | End: 2022-05-17 | Stop reason: HOSPADM

## 2022-05-11 RX ORDER — METOPROLOL SUCCINATE 25 MG/1
25 TABLET, EXTENDED RELEASE ORAL DAILY
Status: DISCONTINUED | OUTPATIENT
Start: 2022-05-12 | End: 2022-05-13

## 2022-05-11 RX ORDER — INSULIN LISPRO 100 [IU]/ML
0-6 INJECTION, SOLUTION INTRAVENOUS; SUBCUTANEOUS
Status: DISCONTINUED | OUTPATIENT
Start: 2022-05-12 | End: 2022-05-17 | Stop reason: HOSPADM

## 2022-05-11 RX ORDER — INSULIN GLARGINE-YFGN 100 [IU]/ML
0.25 INJECTION, SOLUTION SUBCUTANEOUS NIGHTLY
Status: DISCONTINUED | OUTPATIENT
Start: 2022-05-11 | End: 2022-05-17 | Stop reason: HOSPADM

## 2022-05-11 RX ORDER — ACETAMINOPHEN 650 MG/1
650 SUPPOSITORY RECTAL EVERY 6 HOURS PRN
Status: DISCONTINUED | OUTPATIENT
Start: 2022-05-11 | End: 2022-05-17 | Stop reason: HOSPADM

## 2022-05-11 RX ORDER — IPRATROPIUM BROMIDE AND ALBUTEROL SULFATE 2.5; .5 MG/3ML; MG/3ML
3 SOLUTION RESPIRATORY (INHALATION) EVERY 4 HOURS PRN
Status: DISCONTINUED | OUTPATIENT
Start: 2022-05-11 | End: 2022-05-17 | Stop reason: HOSPADM

## 2022-05-11 RX ORDER — FUROSEMIDE 10 MG/ML
40 INJECTION INTRAMUSCULAR; INTRAVENOUS ONCE
Status: COMPLETED | OUTPATIENT
Start: 2022-05-11 | End: 2022-05-11

## 2022-05-11 RX ORDER — ATORVASTATIN CALCIUM 20 MG/1
20 TABLET, FILM COATED ORAL DAILY
Status: DISCONTINUED | OUTPATIENT
Start: 2022-05-12 | End: 2022-05-17 | Stop reason: HOSPADM

## 2022-05-11 RX ORDER — POLYETHYLENE GLYCOL 3350 17 G/17G
17 POWDER, FOR SOLUTION ORAL DAILY PRN
Status: DISCONTINUED | OUTPATIENT
Start: 2022-05-11 | End: 2022-05-17 | Stop reason: HOSPADM

## 2022-05-11 RX ORDER — LAMOTRIGINE 100 MG/1
200 TABLET ORAL DAILY
Status: DISCONTINUED | OUTPATIENT
Start: 2022-05-12 | End: 2022-05-17 | Stop reason: HOSPADM

## 2022-05-11 RX ORDER — INSULIN LISPRO 100 [IU]/ML
0-3 INJECTION, SOLUTION INTRAVENOUS; SUBCUTANEOUS NIGHTLY
Status: DISCONTINUED | OUTPATIENT
Start: 2022-05-11 | End: 2022-05-17 | Stop reason: HOSPADM

## 2022-05-11 RX ORDER — PANTOPRAZOLE SODIUM 40 MG/1
40 TABLET, DELAYED RELEASE ORAL
Status: DISCONTINUED | OUTPATIENT
Start: 2022-05-12 | End: 2022-05-17 | Stop reason: HOSPADM

## 2022-05-11 RX ORDER — VENLAFAXINE HYDROCHLORIDE 150 MG/1
150 CAPSULE, EXTENDED RELEASE ORAL DAILY
Status: DISCONTINUED | OUTPATIENT
Start: 2022-05-12 | End: 2022-05-12

## 2022-05-11 RX ORDER — CARBIDOPA AND LEVODOPA 25; 100 MG/1; MG/1
2 TABLET, EXTENDED RELEASE ORAL 3 TIMES DAILY
Status: DISCONTINUED | OUTPATIENT
Start: 2022-05-11 | End: 2022-05-17 | Stop reason: HOSPADM

## 2022-05-11 RX ORDER — ROPINIROLE 1 MG/1
1 TABLET, FILM COATED ORAL 3 TIMES DAILY
Status: DISCONTINUED | OUTPATIENT
Start: 2022-05-11 | End: 2022-05-17 | Stop reason: HOSPADM

## 2022-05-11 RX ORDER — ACETAMINOPHEN 325 MG/1
650 TABLET ORAL EVERY 6 HOURS PRN
Status: DISCONTINUED | OUTPATIENT
Start: 2022-05-11 | End: 2022-05-17 | Stop reason: HOSPADM

## 2022-05-11 RX ORDER — BUSPIRONE HYDROCHLORIDE 5 MG/1
5 TABLET ORAL 3 TIMES DAILY
Status: DISCONTINUED | OUTPATIENT
Start: 2022-05-11 | End: 2022-05-17 | Stop reason: HOSPADM

## 2022-05-11 RX ORDER — ISOSORBIDE MONONITRATE 30 MG/1
30 TABLET, EXTENDED RELEASE ORAL DAILY
Status: DISCONTINUED | OUTPATIENT
Start: 2022-05-12 | End: 2022-05-13

## 2022-05-11 RX ORDER — DOXEPIN HYDROCHLORIDE 10 MG/1
10 CAPSULE ORAL NIGHTLY
Status: DISCONTINUED | OUTPATIENT
Start: 2022-05-11 | End: 2022-05-12

## 2022-05-11 RX ORDER — SODIUM CHLORIDE 9 MG/ML
INJECTION, SOLUTION INTRAVENOUS PRN
Status: DISCONTINUED | OUTPATIENT
Start: 2022-05-11 | End: 2022-05-17 | Stop reason: HOSPADM

## 2022-05-11 RX ORDER — SODIUM CHLORIDE 0.9 % (FLUSH) 0.9 %
5-40 SYRINGE (ML) INJECTION EVERY 12 HOURS SCHEDULED
Status: DISCONTINUED | OUTPATIENT
Start: 2022-05-11 | End: 2022-05-17 | Stop reason: HOSPADM

## 2022-05-11 RX ORDER — ALBUTEROL SULFATE 90 UG/1
2 AEROSOL, METERED RESPIRATORY (INHALATION) EVERY 6 HOURS PRN
Status: DISCONTINUED | OUTPATIENT
Start: 2022-05-11 | End: 2022-05-12 | Stop reason: CLARIF

## 2022-05-11 RX ORDER — MONTELUKAST SODIUM 10 MG/1
10 TABLET ORAL NIGHTLY
Status: DISCONTINUED | OUTPATIENT
Start: 2022-05-11 | End: 2022-05-17 | Stop reason: HOSPADM

## 2022-05-11 RX ORDER — ALPRAZOLAM 0.25 MG/1
0.5 TABLET ORAL DAILY PRN
Status: DISCONTINUED | OUTPATIENT
Start: 2022-05-11 | End: 2022-05-17 | Stop reason: HOSPADM

## 2022-05-11 RX ORDER — DEXTROSE MONOHYDRATE 50 MG/ML
100 INJECTION, SOLUTION INTRAVENOUS PRN
Status: DISCONTINUED | OUTPATIENT
Start: 2022-05-11 | End: 2022-05-17 | Stop reason: HOSPADM

## 2022-05-11 RX ORDER — ONDANSETRON 2 MG/ML
4 INJECTION INTRAMUSCULAR; INTRAVENOUS EVERY 6 HOURS PRN
Status: DISCONTINUED | OUTPATIENT
Start: 2022-05-11 | End: 2022-05-17 | Stop reason: HOSPADM

## 2022-05-11 RX ORDER — FUROSEMIDE 10 MG/ML
40 INJECTION INTRAMUSCULAR; INTRAVENOUS 2 TIMES DAILY
Status: DISCONTINUED | OUTPATIENT
Start: 2022-05-12 | End: 2022-05-13

## 2022-05-11 RX ADMIN — FUROSEMIDE 40 MG: 10 INJECTION, SOLUTION INTRAMUSCULAR; INTRAVENOUS at 21:43

## 2022-05-11 NOTE — ED NOTES
Received report from Joe Ca, [address] [place] [address].       Andreina Guillory RN  05/11/22 1931

## 2022-05-11 NOTE — ED NOTES
Per EMS the pt has been seeing things- children in the [place]reira- thought son was in the ambulance- yelling out- \"IIm going to kill you- get away from me\"- conversations with unseen others.        Rommel [facility], [facility]  05/11/22 0043

## 2022-05-11 NOTE — LETTER
[facility] Medicaid  CERTIFICATION OF NECESSITY  FOR NON-EMERGENCY TRANSPORTATION   BY GROUND AMBULANCE      Individual Information   1. Name: Andreia Allen 2. [place] Medicaid Billing Number:    3. Address: [address] [address]      Transportation Provider Information   4. Provider Name:    5. [place] Medicaid Provider Number:  National Provider Identifier (NPI):      Certification  7. Criteria:  During transport, this individual requires:  [x] Medical treatment or continuous     supervision by an EMT. [] The administration or regulation of oxygen by another person. [] Supervised protective restraint. 8. Period Beginning Date:    5. Length  [x] Not more than 1 day(s)  [] One Year     Additional Information Relevant to Certification   10. Comments or Explanations, If Necessary or Appropriate    hallucinations, delirium  AFib with RVR, recurrent s/p successful DCCV 5/16/2022  Acute hypoxia 2/2 Acute HFpEF,   SHANTANU  on CKD III   Moderate CAD    SERENA, not compliant with CPAP   Asthma & Continue tobacco abuse  Morbid obesity, BMI 40   HTN,   DM2  Debilitation        Certifying Practitioner Information   11. Name of Practitioner: Dr Nannette Mccarty   12. [place] Medicaid Provider Number, If Applicable:  [address] Provider Identifier (NPI):      Signature Information   14. Date of Signature: Electronically signed by Celena Bernard RN on 5/13/2022 at 10:43 AM 15. Name of Person Signing: Electronically signed by Celena Bernard RN on 5/13/2022 at 10:43 AM   16. Signature and Professional Designation: Electronically signed by Celena Bernard RN on 5/13/2022 at 10:43 AM     OD 72917  Rev. 7/2015  [address] Encounter Date/Time: 5/11/2022 [address] Account: [de-identified]    MRN: [id number]    Patient: Andreia Allen    Contact Serial #: [id number]      ENCOUNTER          Patient Class: I Private Enc? No Unit  Lester Olivia [address] [place] [address] Drive [id number]   Hospital Service:  INM   Encounter DX: Heart failure ([facility] [Q28*   ADM Provider: Romy Westbrook DO   Procedure:     ATT Provider: Cyril Sicard, DO   REF Provider:        Admission DX: Heart failure ([facility] and DX codes: I50.9      PATIENT                 Name: Zuleima Damon : 1950 (71 yrs)   Address: [address] [place] [address] 72 Sex: Female   [place] city: [place] [address]         Marital Status:    Employer: RETIRED         [employer]: [employer]   Primary Care Provider:           Primary Phone: [phone]   EMERGENCY CONTACT   Contact Name Legal Guardian? Relationship to Patient Home Phone Work Phone   1. Tom Thomas  2. Rony Alicia No  No Child  Child [phone]                 GUARANTOR            Guarantor: Zuleima Damon     : 1950   Address: [facility] [address] Sex: Female   [place] [address]     Relation to Patient: Self       Home Phone: [phone]   Guarantor ID: [id number]       Work Phone:     Guarantor Employer: RETIRED         Status: RETIRED      COVERAGE        PRIMARY INSURANCE   Payor: [facility] MEDICARE Plan: Vinh PEREZ*   Payor Address: ,          Group Number: [id number] Insurance Type: INDEMNITY   Subscriber Name: Raeann Palmer : 1950   Subscriber ID: [id number] Pat. Rel. to Sub: Self   SECONDARY INSURANCE   Payor: Lucia Koo* Plan: Chelsea MCKAY*   Payor Address:  [facility], [address] [place] [address]          Group Number: [id number] Insurance Type: INDEMNITY   Subscriber Name: Raeann Palmer : 1950   Subscriber ID: [id number] Pat.  Rel. to Sub: SELF           CSN: [id number]

## 2022-05-11 NOTE — ED PROVIDER NOTES
Department of Emergency Medicine   ED  Provider Note  Admit Date/RoomTime: 5/11/2022  4:40 PM  ED Room: 1006/2397-G          History of Present Illness:  5/11/22, Time: 4:58 PM EDT  Chief Complaint   Patient presents with    Other     Pt is upset because she did not want her son to come and visit her due to [place]. Pt denies SI or HI. Denies hallucinations. Yue Jones is a 66 y.o. female presenting to the ED for hallucinations, beginning today. The complaint has been persistent, mild in severity, and worsened by nothing. The patient is a 71-year-old female who presents emergency department for hallucinations. The patient symptoms are sudden onset today, persistent, mild in severity, nothing makes it better or worse. Patient is very upset and states that she is not having any hallucinations and she is fine but her son made her come to get checked out. She is upset because she did not want her son to come and visit her due to [place] and he came to visit her and he ended up having her sent here because he apparently thought that she was hallucinating. However, patient states that she is not having any hallucinations at all or any symptoms at this time. She does have a history of congestive heart failure and is chronically on nasal cannula oxygen. She had is on her home oxygen currently. She denies any suicidal ideations, hallucinations, visual hallucinations, auditory hallucinations, chest pain, abdominal pain, numbness, tingling, unilateral weakness, recent hospitalization, recent illness, or other acute symptoms or concerns.     Review of Systems:   A complete review of systems was performed and pertinent positives and negatives are stated within HPI, all other systems reviewed and are negative.        --------------------------------------------- PAST HISTORY ---------------------------------------------  Past Medical History:  has a past medical history of Acute on chronic congestive heart failure ([facility] [address] Anxiety, Asthma, CAD (coronary artery disease), Cancer ([facility] [address] Chronic kidney disease, Depression, Diabetes mellitus ([facility] [address] H/O mammogram, Hx MRSA infection, Hyperlipidemia, Hypertension, Lateral epicondylitis, SERENA on CPAP, Parkinson's disease ([facility] [address] and Tubal ligation status. Past Surgical History:  has a past surgical history that includes Gallbladder surgery; Toe amputation; Cholecystectomy; Colonoscopy (7/29/15); Endoscopy, colon, diagnostic (7/19/15); Upper gastrointestinal endoscopy; lumbar laminectomy; Tonsillectomy; Breast lumpectomy; Breast reduction surgery; Cardiac catheterization (4/28/2014); and Cardiac catheterization (01/11/2022). Social History:  reports that she has never smoked. She has never used smokeless tobacco. She reports that she does not drink alcohol and does not use drugs. Family History: family history includes Cancer in her father and mother; Diabetes in her sister; Heart Disease in her sister; Other in her brother; Seizures in her son. . Unless otherwise noted, family history is non contributory    The patients home medications have been reviewed. Allergies: Ciprofloxacin and Codeine    I have reviewed the past medical history, past surgical history, social history, and family history    ---------------------------------------------------PHYSICAL EXAM--------------------------------------    Constitutional/General: Alert and oriented x3, obese female sitting up in bed in no distress with nasal cannula in place. Head: Normocephalic and atraumatic  Eyes: EOMI, sclera non icteric  ENT: Oropharynx clear, handling secretions, no trismus, no asymmetry of the posterior oropharynx or uvular edema  Neck: Supple, full ROM, no stridor, no meningeal signs  Respiratory: Lungs diminished bilaterally to auscultation bilaterally, no wheezes, rales, or rhonchi. Not in respiratory distress  Cardiovascular:  Tachycardic rate. Irregular rhythm.  No murmurs, no gallops, no rubs. 2+ distal pulses. Equal extremity pulses. Gastrointestinal:  Abdomen Soft, Non tender, Non distended. No rebound, guarding, or rigidity. No pulsatile masses. Musculoskeletal: Moves all extremities x 4. Warm and well perfused, no clubbing, no cyanosis, +1 edema. Capillary refill <3 seconds  Skin: skin warm and dry. No rashes. Neurologic: GCS 15, no focal deficits, symmetric strength 5/5 in the upper and lower extremities bilaterally  Psychiatric: Normal Affect    -------------------------------------------------- RESULTS -------------------------------------------------  I have personally reviewed all laboratory and imaging results for this patient. Results are listed below.      LABS: (Lab results interpreted by me)  Results for orders placed or performed during the hospital encounter of 05/11/22   COVID-19 & Influenza Combo    Specimen: Nasopharyngeal Swab   Result Value Ref Range    SARS-CoV-2 RNA, RT PCR NOT DETECTED NOT DETECTED    INFLUENZA A NOT DETECTED NOT DETECTED    INFLUENZA B NOT DETECTED NOT DETECTED   CBC with Auto Differential   Result Value Ref Range    WBC 5.7 4.5 - 11.5 E9/L    RBC 3.61 3.50 - 5.50 E12/L    Hemoglobin 9.3 (L) 11.5 - 15.5 g/dL    Hematocrit 31.9 (L) 34.0 - 48.0 %    MCV 88.4 80.0 - 99.9 fL    MCH 25.8 (L) 26.0 - 35.0 pg    MCHC 29.2 (L) 32.0 - 34.5 %    RDW 17.2 (H) 11.5 - 15.0 fL    Platelets 599 736 - 481 E9/L    MPV 10.5 7.0 - 12.0 fL    Neutrophils % 77.4 43.0 - 80.0 %    Immature Granulocytes % 0.4 0.0 - 5.0 %    Lymphocytes % 16.1 (L) 20.0 - 42.0 %    Monocytes % 4.7 2.0 - 12.0 %    Eosinophils % 0.9 0.0 - 6.0 %    Basophils % 0.5 0.0 - 2.0 %    Neutrophils Absolute 4.42 1.80 - 7.30 E9/L    Immature Granulocytes # 0.02 E9/L    Lymphocytes Absolute 0.92 (L) 1.50 - 4.00 E9/L    Monocytes Absolute 0.27 0.10 - 0.95 E9/L    Eosinophils Absolute 0.05 0.05 - 0.50 E9/L    Basophils Absolute 0.03 0.00 - 0.20 E9/L   Comprehensive Metabolic Panel w/ Reflex to MG   Result Value Ref Range    Sodium 139 132 - 146 mmol/L    Potassium reflex Magnesium 4.5 3.5 - 5.0 mmol/L    Chloride 99 98 - 107 mmol/L    CO2 30 (H) 22 - 29 mmol/L    Anion Gap 10 7 - 16 mmol/L    Glucose 165 (H) 74 - 99 mg/dL    BUN 24 (H) 6 - 23 mg/dL    CREATININE 1.3 (H) 0.5 - 1.0 mg/dL    GFR Non-African American 40 >=60 mL/min/1.73    GFR African American 49     Calcium 9.1 8.6 - 10.2 mg/dL    Total Protein 6.2 (L) 6.4 - 8.3 g/dL    Albumin 4.2 3.5 - 5.2 g/dL    Total Bilirubin 0.6 0.0 - 1.2 mg/dL    Alkaline Phosphatase 124 (H) 35 - 104 U/L    ALT <5 0 - 32 U/L    AST 8 0 - 31 U/L   Urinalysis with Microscopic   Result Value Ref Range    Color, UA Yellow Straw/Yellow    Clarity, UA Clear Clear    Glucose, Ur Negative Negative mg/dL    Bilirubin Urine Negative Negative    Ketones, Urine Negative Negative mg/dL    Specific Gravity, UA 1.015 1.005 - 1.030    Blood, Urine Negative Negative    pH, UA 6.0 5.0 - 9.0    Protein, UA 30 (A) Negative mg/dL    Urobilinogen, Urine 0.2 <2.0 E.U./dL    Nitrite, Urine Negative Negative    Leukocyte Esterase, Urine TRACE (A) Negative    WBC, UA 1-3 0 - 5 /HPF    RBC, UA NONE 0 - 2 /HPF    Epithelial Cells, UA RARE /HPF    Bacteria, UA FEW (A) None Seen /HPF   URINE DRUG SCREEN   Result Value Ref Range    Amphetamine Screen, Urine NOT DETECTED Negative <1000 ng/mL    Barbiturate Screen, Ur NOT DETECTED Negative < 200 ng/mL    Benzodiazepine Screen, Urine NOT DETECTED Negative < 200 ng/mL    Cannabinoid Scrn, Ur NOT DETECTED Negative < 50ng/mL    Cocaine Metabolite Screen, Urine NOT DETECTED Negative < 300 ng/mL    Opiate Scrn, Ur NOT DETECTED Negative < 300ng/mL    PCP Screen, Urine NOT DETECTED Negative < 25 ng/mL    Methadone Screen, Urine NOT DETECTED Negative <300 ng/mL    Oxycodone Urine NOT DETECTED Negative <100 ng/mL    FENTANYL SCREEN, URINE NOT DETECTED Negative <1 ng/mL    Drug Screen Comment: see below    Serum Drug Screen   Result Value Ref Range    Ethanol Lvl <10 mg/dL Acetaminophen Level <5.0 (L) 10.0 - 22.7 mcg/mL    Salicylate, Serum <2.5 0.0 - 30.0 mg/dL    TCA Scrn NEGATIVE Cutoff:300 ng/mL   Brain Natriuretic Peptide   Result Value Ref Range    Pro-BNP 7,325 (H) 0 - 125 pg/mL   Troponin   Result Value Ref Range    Troponin, High Sensitivity 21 (H) 0 - 9 ng/L   Troponin   Result Value Ref Range    Troponin, High Sensitivity 20 (H) 0 - 9 ng/L   Basic Metabolic Panel   Result Value Ref Range    Sodium 143 132 - 146 mmol/L    Potassium 4.4 3.5 - 5.0 mmol/L    Chloride 101 98 - 107 mmol/L    CO2 32 (H) 22 - 29 mmol/L    Anion Gap 10 7 - 16 mmol/L    Glucose 123 (H) 74 - 99 mg/dL    BUN 23 6 - 23 mg/dL    CREATININE 1.4 (H) 0.5 - 1.0 mg/dL    GFR Non-African American 37 >=60 mL/min/1.73    GFR African American 45     Calcium 9.0 8.6 - 10.2 mg/dL   Magnesium   Result Value Ref Range    Magnesium 2.3 1.6 - 2.6 mg/dL   Lipid panel - fasting   Result Value Ref Range    Cholesterol, Total 95 0 - 199 mg/dL    Triglycerides 93 0 - 149 mg/dL    HDL 38 >40 mg/dL    LDL Calculated 38 0 - 99 mg/dL    VLDL Cholesterol Calculated 19 mg/dL   Iron and TIBC   Result Value Ref Range    Iron 49 37 - 145 mcg/dL    TIBC 349 250 - 450 mcg/dL    Iron Saturation 14 (L) 15 - 50 %   Hemoglobin A1c   Result Value Ref Range    Hemoglobin A1C 6.5 (H) 4.0 - 5.6 %   CBC with Auto Differential   Result Value Ref Range    WBC 5.4 4.5 - 11.5 E9/L    RBC 3.68 3.50 - 5.50 E12/L    Hemoglobin 9.6 (L) 11.5 - 15.5 g/dL    Hematocrit 33.4 (L) 34.0 - 48.0 %    MCV 90.8 80.0 - 99.9 fL    MCH 26.1 26.0 - 35.0 pg    MCHC 28.7 (L) 32.0 - 34.5 %    RDW 17.4 (H) 11.5 - 15.0 fL    Platelets 669 349 - 384 E9/L    MPV 10.5 7.0 - 12.0 fL    Neutrophils % 72.5 43.0 - 80.0 %    Immature Granulocytes % 0.4 0.0 - 5.0 %    Lymphocytes % 20.3 20.0 - 42.0 %    Monocytes % 5.3 2.0 - 12.0 %    Eosinophils % 0.9 0.0 - 6.0 %    Basophils % 0.6 0.0 - 2.0 %    Neutrophils Absolute 3.94 1.80 - 7.30 E9/L    Immature Granulocytes # 0.02 E9/L Lymphocytes Absolute 1.10 (L) 1.50 - 4.00 E9/L    Monocytes Absolute 0.29 0.10 - 0.95 E9/L    Eosinophils Absolute 0.05 0.05 - 0.50 E9/L    Basophils Absolute 0.03 0.00 - 0.20 E9/L    Anisocytosis 1+     Polychromasia 1+     Hypochromia 1+     Poikilocytes 1+     Ovalocytes 1+     Tear Drop Cells 1+     Basophilic Stippling 1+    POCT Glucose   Result Value Ref Range    Meter Glucose 132 (H) 74 - 99 mg/dL   POCT Glucose   Result Value Ref Range    Meter Glucose 131 (H) 74 - 99 mg/dL   POCT Glucose   Result Value Ref Range    Meter Glucose 142 (H) 74 - 99 mg/dL   POCT Glucose   Result Value Ref Range    Meter Glucose 206 (H) 74 - 99 mg/dL   POCT Glucose   Result Value Ref Range    Meter Glucose 148 (H) 74 - 99 mg/dL   ,       RADIOLOGY:  Interpreted by Radiologist unless otherwise specified  XR CHEST PORTABLE   Final Result   No acute process. ------------------------- NURSING NOTES AND VITALS REVIEWED ---------------------------   The nursing notes within the ED encounter and vital signs as below have been reviewed by myself  BP 99/72   Pulse 105   Temp 97.5 °F (36.4 °C) (Oral)   Resp 16   Wt 244 lb (110.7 kg)   SpO2 100%   BMI 47.65 kg/m²     Oxygen Saturation Interpretation: Normal    The patients available past medical records and past encounters were reviewed.         ------------------------------ ED COURSE/MEDICAL DECISION MAKING----------------------  Medications   ALPRAZolam (XANAX) tablet 0.5 mg (0.5 mg Oral Given 5/12/22 2142)   apixaban (ELIQUIS) tablet 5 mg (5 mg Oral Given 5/12/22 2143)   atorvastatin (LIPITOR) tablet 20 mg (20 mg Oral Given 5/12/22 0912)   busPIRone (BUSPAR) tablet 5 mg (5 mg Oral Given 5/12/22 2143)   carbidopa-levodopa (SINEMET CR)  MG per extended release tablet 2 tablet (2 tablets Oral Given 5/12/22 2142)   ipratropium-albuterol (DUONEB) nebulizer solution 3 mL (has no administration in time range)   isosorbide mononitrate (IMDUR) extended release tablet 30 (has no administration in time range)   budesonide (PULMICORT) nebulizer suspension 500 mcg (500 mcg Nebulization Given 5/12/22 2135)     And   Arformoterol Tartrate (BROVANA) nebulizer solution 15 mcg (15 mcg Nebulization Given 5/12/22 2135)   venlafaxine (EFFEXOR XR) extended release capsule 75 mg (has no administration in time range)   melatonin tablet 3 mg (3 mg Oral Given 5/12/22 1837)   furosemide (LASIX) injection 40 mg (40 mg IntraVENous Given 5/11/22 2143)           The cardiac monitor revealed NSR with a heart rate in the 100s as interpreted by me. The cardiac monitor was ordered secondary to the patient's shortness of breath and to monitor the patient for dysrhythmia. CPT [id number]       I, Dr. Jason Felipe, am the primary provider of record    Medical Decision Making:   The patient is a 58-year-old female who presents the emergency department for claimed hallucinations which patient states she is not having. However, she has been A. fib and found to be in acute exacerbation of congestive heart failure. She was given diuresis in the emergency department and will be admitted to medicine with psychiatric consultation regarding the questionable hallucinations. Oxygen Saturation Interpretation: 100 % on room air. Re-Evaluations:  ED Course as of 05/12/22 2238   Wed May 11, 2022   2116 Consulted with Dr. Quinton Walters, hospitalist, who accepted for admission. [KG]      ED Course User Index  [KG] Georgia Prows, DO       Resting comfortably and in no distress on reassessment. This patient's ED course included: a personal history and physicial examination, re-evaluation prior to disposition, multiple bedside re-evaluations, IV medications, cardiac monitoring, continuous pulse oximetry and complex medical decision making and emergency management    This patient has remained hemodynamically stable during their ED course. Consultations:  Spoke with Dr. Quinton Walters (Medicine). Discussed case.   They will admit this patient. Counseling: The emergency provider has spoken with the patient and discussed todays results, in addition to providing specific details for the plan of care and counseling regarding the diagnosis and prognosis. Questions are answered at this time and they are agreeable with the plan.       --------------------------------- IMPRESSION AND DISPOSITION ---------------------------------    IMPRESSION  1. Acute on chronic congestive heart failure, unspecified heart failure type ([address]    2. Chronic atrial fibrillation (HCC)    3. Chronic kidney disease, unspecified CKD stage        DISPOSITION  Disposition: Admit to telemetry  Patient condition is stable        NOTE: This report was transcribed using voice recognition software.  Every effort was made to ensure accuracy; however, inadvertent computerized transcription errors may be present       Norma Tavera DO  05/12/22 8671

## 2022-05-11 NOTE — TELEPHONE ENCOUNTER
Writer contacted Dr. Beti Lizarraga to inform of 30 day readmission risk. Writer's attempt to contact Dr. Beti Lizarraga was unsuccessful.      Call Back: If you need to call back to inform of disposition you can contact me at [phone]

## 2022-05-12 LAB
ANION GAP SERPL CALCULATED.3IONS-SCNC: 10 MMOL/L (ref 7–16)
ANISOCYTOSIS: ABNORMAL
BASOPHILIC STIPPLING: ABNORMAL
BASOPHILS ABSOLUTE: 0.03 E9/L (ref 0–0.2)
BASOPHILS RELATIVE PERCENT: 0.6 % (ref 0–2)
BUN BLDV-MCNC: 23 MG/DL (ref 6–23)
CALCIUM SERPL-MCNC: 9 MG/DL (ref 8.6–10.2)
CHLORIDE BLD-SCNC: 101 MMOL/L (ref 98–107)
CHOLESTEROL, TOTAL: 95 MG/DL (ref 0–199)
CO2: 32 MMOL/L (ref 22–29)
CREAT SERPL-MCNC: 1.4 MG/DL (ref 0.5–1)
EOSINOPHILS ABSOLUTE: 0.05 E9/L (ref 0.05–0.5)
EOSINOPHILS RELATIVE PERCENT: 0.9 % (ref 0–6)
GFR AFRICAN AMERICAN: 45
GFR NON-AFRICAN AMERICAN: 37 ML/MIN/1.73
GLUCOSE BLD-MCNC: 123 MG/DL (ref 74–99)
HBA1C MFR BLD: 6.5 % (ref 4–5.6)
HCT VFR BLD CALC: 33.4 % (ref 34–48)
HDLC SERPL-MCNC: 38 MG/DL
HEMOGLOBIN: 9.6 G/DL (ref 11.5–15.5)
HYPOCHROMIA: ABNORMAL
IMMATURE GRANULOCYTES #: 0.02 E9/L
IMMATURE GRANULOCYTES %: 0.4 % (ref 0–5)
IRON SATURATION: 14 % (ref 15–50)
IRON: 49 MCG/DL (ref 37–145)
LDL CHOLESTEROL CALCULATED: 38 MG/DL (ref 0–99)
LYMPHOCYTES ABSOLUTE: 1.1 E9/L (ref 1.5–4)
LYMPHOCYTES RELATIVE PERCENT: 20.3 % (ref 20–42)
MAGNESIUM: 2.3 MG/DL (ref 1.6–2.6)
MCH RBC QN AUTO: 26.1 PG (ref 26–35)
MCHC RBC AUTO-ENTMCNC: 28.7 % (ref 32–34.5)
MCV RBC AUTO: 90.8 FL (ref 80–99.9)
METER GLUCOSE: 131 MG/DL (ref 74–99)
METER GLUCOSE: 132 MG/DL (ref 74–99)
METER GLUCOSE: 142 MG/DL (ref 74–99)
METER GLUCOSE: 148 MG/DL (ref 74–99)
METER GLUCOSE: 206 MG/DL (ref 74–99)
MONOCYTES ABSOLUTE: 0.29 E9/L (ref 0.1–0.95)
MONOCYTES RELATIVE PERCENT: 5.3 % (ref 2–12)
NEUTROPHILS ABSOLUTE: 3.94 E9/L (ref 1.8–7.3)
NEUTROPHILS RELATIVE PERCENT: 72.5 % (ref 43–80)
OVALOCYTES: ABNORMAL
PDW BLD-RTO: 17.4 FL (ref 11.5–15)
PLATELET # BLD: 206 E9/L (ref 130–450)
PMV BLD AUTO: 10.5 FL (ref 7–12)
POIKILOCYTES: ABNORMAL
POLYCHROMASIA: ABNORMAL
POTASSIUM SERPL-SCNC: 4.4 MMOL/L (ref 3.5–5)
RBC # BLD: 3.68 E12/L (ref 3.5–5.5)
SODIUM BLD-SCNC: 143 MMOL/L (ref 132–146)
TEAR DROP CELLS: ABNORMAL
TOTAL IRON BINDING CAPACITY: 349 MCG/DL (ref 250–450)
TRIGL SERPL-MCNC: 93 MG/DL (ref 0–149)
VLDLC SERPL CALC-MCNC: 19 MG/DL
WBC # BLD: 5.4 E9/L (ref 4.5–11.5)

## 2022-05-12 PROCEDURE — S5553 INSULIN LONG ACTING 5 U: HCPCS | Performed by: FAMILY MEDICINE

## 2022-05-12 PROCEDURE — 80061 LIPID PANEL: CPT

## 2022-05-12 PROCEDURE — 83735 ASSAY OF MAGNESIUM: CPT

## 2022-05-12 PROCEDURE — 80048 BASIC METABOLIC PNL TOTAL CA: CPT

## 2022-05-12 PROCEDURE — 2140000000 HC CCU INTERMEDIATE R&B

## 2022-05-12 PROCEDURE — 2700000000 HC OXYGEN THERAPY PER DAY

## 2022-05-12 PROCEDURE — 2580000003 HC RX 258: Performed by: FAMILY MEDICINE

## 2022-05-12 PROCEDURE — 85025 COMPLETE CBC W/AUTO DIFF WBC: CPT

## 2022-05-12 PROCEDURE — 6370000000 HC RX 637 (ALT 250 FOR IP): Performed by: NURSE PRACTITIONER

## 2022-05-12 PROCEDURE — 83540 ASSAY OF IRON: CPT

## 2022-05-12 PROCEDURE — 6360000002 HC RX W HCPCS: Performed by: FAMILY MEDICINE

## 2022-05-12 PROCEDURE — 2500000003 HC RX 250 WO HCPCS: Performed by: FAMILY MEDICINE

## 2022-05-12 PROCEDURE — 83550 IRON BINDING TEST: CPT

## 2022-05-12 PROCEDURE — 6370000000 HC RX 637 (ALT 250 FOR IP): Performed by: FAMILY MEDICINE

## 2022-05-12 PROCEDURE — 90792 PSYCH DIAG EVAL W/MED SRVCS: CPT | Performed by: NURSE PRACTITIONER

## 2022-05-12 PROCEDURE — 83036 HEMOGLOBIN GLYCOSYLATED A1C: CPT

## 2022-05-12 PROCEDURE — 36415 COLL VENOUS BLD VENIPUNCTURE: CPT

## 2022-05-12 PROCEDURE — 82962 GLUCOSE BLOOD TEST: CPT

## 2022-05-12 PROCEDURE — 94640 AIRWAY INHALATION TREATMENT: CPT

## 2022-05-12 RX ORDER — ARFORMOTEROL TARTRATE 15 UG/2ML
15 SOLUTION RESPIRATORY (INHALATION) 2 TIMES DAILY
Status: DISCONTINUED | OUTPATIENT
Start: 2022-05-12 | End: 2022-05-17 | Stop reason: HOSPADM

## 2022-05-12 RX ORDER — ALBUTEROL SULFATE 2.5 MG/3ML
2.5 SOLUTION RESPIRATORY (INHALATION) EVERY 6 HOURS PRN
Status: DISCONTINUED | OUTPATIENT
Start: 2022-05-12 | End: 2022-05-17 | Stop reason: HOSPADM

## 2022-05-12 RX ORDER — BUDESONIDE 0.5 MG/2ML
0.5 INHALANT ORAL 2 TIMES DAILY
Status: DISCONTINUED | OUTPATIENT
Start: 2022-05-12 | End: 2022-05-17 | Stop reason: HOSPADM

## 2022-05-12 RX ORDER — VENLAFAXINE HYDROCHLORIDE 75 MG/1
75 CAPSULE, EXTENDED RELEASE ORAL DAILY
Status: DISCONTINUED | OUTPATIENT
Start: 2022-05-13 | End: 2022-05-17 | Stop reason: HOSPADM

## 2022-05-12 RX ORDER — LANOLIN ALCOHOL/MO/W.PET/CERES
3 CREAM (GRAM) TOPICAL DAILY
Status: DISCONTINUED | OUTPATIENT
Start: 2022-05-12 | End: 2022-05-17 | Stop reason: HOSPADM

## 2022-05-12 RX ADMIN — Medication 10 ML: at 02:17

## 2022-05-12 RX ADMIN — BUSPIRONE HYDROCHLORIDE 5 MG: 5 TABLET ORAL at 09:11

## 2022-05-12 RX ADMIN — INSULIN GLARGINE-YFGN 28 UNITS: 100 INJECTION, SOLUTION SUBCUTANEOUS at 02:24

## 2022-05-12 RX ADMIN — ACETAMINOPHEN 650 MG: 325 TABLET ORAL at 16:17

## 2022-05-12 RX ADMIN — PANTOPRAZOLE SODIUM 40 MG: 40 TABLET, DELAYED RELEASE ORAL at 05:32

## 2022-05-12 RX ADMIN — LAMOTRIGINE 200 MG: 100 TABLET ORAL at 09:11

## 2022-05-12 RX ADMIN — ROPINIROLE HYDROCHLORIDE 1 MG: 1 TABLET, FILM COATED ORAL at 16:19

## 2022-05-12 RX ADMIN — DEXTROSE MONOHYDRATE 2.5 MG/HR: 50 INJECTION, SOLUTION INTRAVENOUS at 02:23

## 2022-05-12 RX ADMIN — ALPRAZOLAM 0.5 MG: 0.25 TABLET ORAL at 21:42

## 2022-05-12 RX ADMIN — FUROSEMIDE 40 MG: 10 INJECTION, SOLUTION INTRAMUSCULAR; INTRAVENOUS at 09:12

## 2022-05-12 RX ADMIN — ALPRAZOLAM 0.5 MG: 0.25 TABLET ORAL at 02:16

## 2022-05-12 RX ADMIN — DOXEPIN HYDROCHLORIDE 10 MG: 10 CAPSULE ORAL at 02:17

## 2022-05-12 RX ADMIN — ARFORMOTEROL TARTRATE 15 MCG: 15 SOLUTION RESPIRATORY (INHALATION) at 21:35

## 2022-05-12 RX ADMIN — MONTELUKAST 10 MG: 10 TABLET, FILM COATED ORAL at 21:42

## 2022-05-12 RX ADMIN — Medication 10 ML: at 09:12

## 2022-05-12 RX ADMIN — APIXABAN 5 MG: 5 TABLET, FILM COATED ORAL at 09:12

## 2022-05-12 RX ADMIN — ARFORMOTEROL TARTRATE 15 MCG: 15 SOLUTION RESPIRATORY (INHALATION) at 10:53

## 2022-05-12 RX ADMIN — BUDESONIDE 500 MCG: 0.5 SUSPENSION RESPIRATORY (INHALATION) at 21:35

## 2022-05-12 RX ADMIN — CARBIDOPA AND LEVODOPA 2 TABLET: 25; 100 TABLET, EXTENDED RELEASE ORAL at 21:42

## 2022-05-12 RX ADMIN — Medication 10 ML: at 21:43

## 2022-05-12 RX ADMIN — Medication 3 MG: at 18:37

## 2022-05-12 RX ADMIN — APIXABAN 5 MG: 5 TABLET, FILM COATED ORAL at 02:16

## 2022-05-12 RX ADMIN — ROPINIROLE HYDROCHLORIDE 1 MG: 1 TABLET, FILM COATED ORAL at 21:42

## 2022-05-12 RX ADMIN — MONTELUKAST 10 MG: 10 TABLET, FILM COATED ORAL at 02:16

## 2022-05-12 RX ADMIN — INSULIN LISPRO 2 UNITS: 100 INJECTION, SOLUTION INTRAVENOUS; SUBCUTANEOUS at 18:38

## 2022-05-12 RX ADMIN — APIXABAN 5 MG: 5 TABLET, FILM COATED ORAL at 21:43

## 2022-05-12 RX ADMIN — CARBIDOPA AND LEVODOPA 2 TABLET: 25; 100 TABLET, EXTENDED RELEASE ORAL at 09:11

## 2022-05-12 RX ADMIN — BUDESONIDE 500 MCG: 0.5 SUSPENSION RESPIRATORY (INHALATION) at 10:53

## 2022-05-12 RX ADMIN — INSULIN GLARGINE-YFGN 28 UNITS: 100 INJECTION, SOLUTION SUBCUTANEOUS at 22:04

## 2022-05-12 RX ADMIN — ISOSORBIDE MONONITRATE 30 MG: 30 TABLET, EXTENDED RELEASE ORAL at 09:12

## 2022-05-12 RX ADMIN — BUSPIRONE HYDROCHLORIDE 5 MG: 5 TABLET ORAL at 21:43

## 2022-05-12 RX ADMIN — FUROSEMIDE 40 MG: 10 INJECTION, SOLUTION INTRAMUSCULAR; INTRAVENOUS at 18:37

## 2022-05-12 RX ADMIN — ROPINIROLE HYDROCHLORIDE 1 MG: 1 TABLET, FILM COATED ORAL at 09:11

## 2022-05-12 RX ADMIN — ATORVASTATIN CALCIUM 20 MG: 20 TABLET, FILM COATED ORAL at 09:12

## 2022-05-12 RX ADMIN — INSULIN LISPRO 1 UNITS: 100 INJECTION, SOLUTION INTRAVENOUS; SUBCUTANEOUS at 22:05

## 2022-05-12 RX ADMIN — BUSPIRONE HYDROCHLORIDE 5 MG: 5 TABLET ORAL at 16:18

## 2022-05-12 RX ADMIN — BUSPIRONE HYDROCHLORIDE 5 MG: 5 TABLET ORAL at 02:17

## 2022-05-12 RX ADMIN — CARBIDOPA AND LEVODOPA 2 TABLET: 25; 100 TABLET, EXTENDED RELEASE ORAL at 16:18

## 2022-05-12 RX ADMIN — METOPROLOL SUCCINATE 25 MG: 25 TABLET, EXTENDED RELEASE ORAL at 12:14

## 2022-05-12 RX ADMIN — INSULIN LISPRO 1 UNITS: 100 INJECTION, SOLUTION INTRAVENOUS; SUBCUTANEOUS at 12:18

## 2022-05-12 RX ADMIN — VENLAFAXINE HYDROCHLORIDE 150 MG: 150 CAPSULE, EXTENDED RELEASE ORAL at 09:12

## 2022-05-12 ASSESSMENT — PAIN SCALES - WONG BAKER: WONGBAKER_NUMERICALRESPONSE: 0

## 2022-05-12 ASSESSMENT — PAIN DESCRIPTION - DESCRIPTORS: DESCRIPTORS: DISCOMFORT;DULL;SORE

## 2022-05-12 ASSESSMENT — PAIN - FUNCTIONAL ASSESSMENT: PAIN_FUNCTIONAL_ASSESSMENT: ACTIVITIES ARE NOT PREVENTED

## 2022-05-12 ASSESSMENT — PAIN SCALES - GENERAL: PAINLEVEL_OUTOF10: 3

## 2022-05-12 ASSESSMENT — PAIN DESCRIPTION - ORIENTATION: ORIENTATION: LEFT

## 2022-05-12 ASSESSMENT — PAIN DESCRIPTION - LOCATION: LOCATION: ELBOW

## 2022-05-12 NOTE — PLAN OF CARE
Patient's chart updated to reflect:      . - HF care plan, HF education points and HF discharge instructions.  -Orders: 2 gram sodium diet, daily weights, I/O.  -PCP and cardiology follow up appointments to be scheduled within 7 days of hospital discharge. -CHF education session will be provided to the patient prior to hospital discharge.     Emanuel Trivedi RN RN, BSN  Heart Failure Navigator

## 2022-05-12 NOTE — PROGRESS NOTES
Hospitalist Progress Note      SYNOPSIS: Patient admitted on 5/11/2022 for Heart failure [facility])      SUBJECTIVE:    Patient seen and examined  Records reviewed. No events overnight  Continues with cardizem drip    Cardio consulted    Psych consulted for assist with hallucinations    Patient resting in bed  Denies any additional complaints     DIET: ADULT DIET; Dysphagia - Minced and Moist; 3 carb choices (45 gm/meal); Low Sodium (2 gm)  CODE: Full Code    Intake/Output Summary (Last 24 hours) at 5/12/2022 1218  Last data filed at 5/12/2022 1000  Gross per 24 hour   Intake 50 ml   Output 175 ml   Net -125 ml       OBJECTIVE:    /81   Pulse 87   Temp 97.6 °F (36.4 °C) (Temporal)   Resp 18   Wt 244 lb (110.7 kg)   SpO2 97%   BMI 47.65 kg/m²     General appearance: No apparent distress, appears stated age and cooperative. HEENT:  Conjunctivae/corneas clear. Neck: Supple. No jugular venous distention. Respiratory: Crackles bilaterally  Cardiovascular: Regular rate - irreg rhythm  Abdomen: Soft, nontender, nondistended  Musculoskeletal: No clubbing, cyanosis, no bilateral lower extremity edema. Brisk capillary refill. Skin:  No rashes  on visible skin  Neurologic: awake, alert     ASSESSMENT:    Principal Problem:    Heart failure ([address]  Resolved Problems:    * No resolved hospital problems. *       PLAN:     66y.o. year old female  who  has a past medical history of Acute on chronic congestive heart failure ([address] Anxiety, Asthma, CAD (coronary artery disease), Cancer ([address] Chronic kidney disease, Depression, Diabetes mellitus ([address] H/O mammogram, Hx MRSA infection, Hyperlipidemia, Hypertension, Lateral epicondylitis, SERENA on CPAP, Parkinson's disease ([address] and Tubal ligation status.         Presented to the emergency department with concerns about hallucinations. Family reports she has been hallucinating. In the emergency department patient is not hallucinating.   Vital signs reveal the 5.7 5.4   HGB 9.3* 9.6*   HCT 31.9* 33.4*    206       Recent Labs     05/11/22  1715 05/12/22  0144    143   K 4.5 4.4   CL 99 101   CO2 30* 32*   BUN 24* 23   CREATININE 1.3* 1.4*   CALCIUM 9.1 9.0       Recent Labs     05/11/22  1715   PROT 6.2*   ALKPHOS 124*   ALT <5   AST 8   BILITOT 0.6       No results for input(s): INR in the last 72 hours. No results for input(s): Bienvenido Oiler in the last 72 hours. Chronic labs:    Lab Results   Component Value Date    CHOL 95 05/12/2022    TRIG 93 05/12/2022    HDL 38 05/12/2022    LDLCALC 38 05/12/2022    TSH 1.030 04/14/2022    INR 1.3 04/15/2022    LABA1C 6.5 (H) 05/12/2022       Radiology: REVIEWED DAILY    +++++++++++++++++++++++++++++++++++++++++++++++++  PARAMVIR DO Taylor Duran Physician - [address] [place], [place]  +++++++++++++++++++++++++++++++++++++++++++++++++  NOTE: This report was transcribed using voice recognition software. Every effort was made to ensure accuracy; however, inadvertent computerized transcription errors may be present.

## 2022-05-12 NOTE — ED NOTES
Charge nurse Corinne Osborn, RN notified that patient will be a medical admit.       Chris Barba RN  05/11/22 2126

## 2022-05-12 NOTE — H&P
Hospitalist History & Physical      PCP: No primary care provider on file. Date of Service: Pt seen/examined on 5/11/2022     Chief Complaint:  had concerns including Other (Pt is upset because she did not want her son to come and visit her due to [place]. Pt denies SI or HI. Denies hallucinations. ). History Of Present Illness:    Ms. Randee Buck, a 66y.o. year old female  who  has a past medical history of Acute on chronic congestive heart failure ([address] Anxiety, Asthma, CAD (coronary artery disease), Cancer ([address] Chronic kidney disease, Depression, Diabetes mellitus ([address] H/O mammogram, Hx MRSA infection, Hyperlipidemia, Hypertension, Lateral epicondylitis, SERENA on CPAP, Parkinson's disease ([address] and Tubal ligation status. Presented to the emergency department with concerns about hallucinations. Family reports she has been hallucinating. In the emergency department patient is not hallucinating. Vital signs reveal the patient to be tachycardic with a rate in 130s. EKG showed atrial fibrillation with RVR. Patient has a history atrial fibrillation and is anticoagulated on Eliquis. The remainder of her vital signs are within normal limits and stable. Laboratory studies demonstrate CO2 30, BUN 24, creatinine 1.3, glucose 165, proBNP 7325, troponin of 21, hemoglobin 9.3. Patient was given a dose of Lasix and medicine was consulted for admission for treatment of fluid overload/heart failure exacerbation.       Past Medical History:   Diagnosis Date    Acute on chronic congestive heart failure (HCC)     Anxiety     Asthma     CAD (coronary artery disease) 01/21/2016    Cancer [facility])  breast ca 2006    right lumpectomy    Chronic kidney disease     nephrolithiasis    Depression     Diabetes mellitus ([address]     H/O mammogram     Hx MRSA infection     toe infection january 2012    Hyperlipidemia     Hypertension     Lateral epicondylitis     SERENA on CPAP     Parkinson's disease [facility])     Tubal ligation status        Past Surgical History:   Procedure Laterality Date    BREAST LUMPECTOMY      BREAST REDUCTION SURGERY      CARDIAC CATHETERIZATION  4/28/2014    Dr. Seda Mckinney  01/11/2022    Dr. Cristina Barron  7/29/15    ENDOSCOPY, COLON, DIAGNOSTIC  7/19/15    GALLBLADDER SURGERY      LUMBAR LAMINECTOMY      TOE AMPUTATION      TONSILLECTOMY      UPPER GASTROINTESTINAL ENDOSCOPY         Prior to Admission medications    Medication Sig Start Date End Date Taking? Authorizing Provider   apixaban (ELIQUIS) 5 MG TABS tablet Take 1 tablet by mouth 2 times daily 4/19/22   Tmo Garner MD   lidocaine 4 % external patch Place 1 patch onto the skin daily Leave on for 12 hrs. Take off for 12 hrs. 4/20/22   Tom Garner MD   budesonide-formoterol [facility]) 160-4.5 MCG/ACT AERO Inhale 2 puffs into the lungs 2 times daily 3/19/22   [address], DO   ipratropium-albuterol (DUONEB) 0.5-2.5 (3) MG/3ML SOLN nebulizer solution Inhale 3 mLs into the lungs every 4 hours as needed for Shortness of Breath 3/19/22   [address], DO   albuterol sulfate HFA (PROVENTIL HFA) 108 (90 Base) MCG/ACT inhaler Inhale 2 puffs into the lungs every 6 hours as needed for Wheezing 3/19/22   Eulalio Lugo,    rOPINIRole (REQUIP) 1 MG tablet Take 1 tablet by mouth 3 times daily 2/8/22   RASHARD Dunlap CNP   carbidopa-levodopa (SINEMET CR)  MG per extended release tablet Take 1 tablet by mouth 3 times daily 1/26/22   RASHARD Dunlap CNP   isosorbide mononitrate (IMDUR) 30 MG extended release tablet Take 1 tablet by mouth daily  Patient not taking: Reported on 4/14/2022 1/11/22   Cait Bolivar MD   nitroGLYCERIN (NITROSTAT) 0.4 MG SL tablet up to max of 3 total doses. If no relief after 1 dose, call 911.   Patient not taking: Reported on 3/17/2022 1/11/22   Cait Bolivar MD   busPIRone (BUSPAR) 5 MG tablet Take 1 tablet by mouth 3 times daily  Patient not taking: Reported on 4/14/2022 1/11/22   Derik Galvin MD   insulin glargine (LANTUS) 100 UNIT/ML injection vial Inject 10 Units into the skin nightly 1/11/22   Derik Galvin MD   metoprolol succinate (TOPROL XL) 25 MG extended release tablet Take 1 tablet by mouth daily 1/12/22   Derik Galvin MD   montelukast (SINGULAIR) 10 MG tablet Take 10 mg by mouth nightly    Historical Provider, MD   ALPRAZolam Unice Nigh) 0.5 MG tablet Take 0.5 mg by mouth daily as needed for Anxiety. Historical Provider, MD   Cholecalciferol (VITAMIN D) 25 MCG TABS Take 1 tablet by mouth daily  Patient not taking: Reported on 4/14/2022 10/26/21   RASHARD Mckenzie - CNP   doxepin (SINEQUAN) 10 MG capsule Take 10 mg by mouth nightly    Historical Provider, MD   venlafaxine (EFFEXOR XR) 150 MG extended release capsule Take 150 mg by mouth daily    Historical Provider, MD   omeprazole (PRILOSEC) 40 MG delayed release capsule Take 40 mg by mouth daily     Historical Provider, MD   blood glucose test strips (ACCU-CHEK RIGOBERTO PLUS) strip 1 each by In Vitro route 2 times daily Indications: DISP ACCU-CHEK As needed. Historical Provider, MD   lamoTRIgine (LAMICTAL) 200 MG tablet Take 200 mg by mouth daily  10/18/19   Historical Provider, MD   atorvastatin (LIPITOR) 20 MG tablet Take 1 tablet by mouth daily 10/21/19   Eduardo Stacy MD   insulin aspart (NOVOLOG FLEXPEN) 100 UNIT/ML injection pen INJECT 0-10 UNITS INTO THE SKIN THREE TIMES DAILY(BEFORE MEALS) SLIDING SCALE (MAX DAILY 30 UNITS)  Patient taking differently: Indications: med. approved 6/1/19-7/29/20 INJECT 0-10 UNITS INTO THE SKIN THREE TIMES DAILY(BEFORE MEALS) SLIDING SCALE (MAX DAILY 30 UNITS) 7/29/19   Eduardo Stacy MD         Allergies:  Ciprofloxacin and Codeine    Social History:    TOBACCO:   reports that she has never smoked.  She has never used smokeless tobacco.  ETOH:   reports no history of alcohol use. Family History:    Reviewed in detail and negative for DM, CAD, Cancer, CVA. Positive as follows\"      Problem Relation Age of Onset    Cancer Mother         Lung Ca    Cancer Father         lung Ca    Diabetes Sister     Heart Disease Sister     Seizures Son     Other Brother         sepsis       REVIEW OF SYSTEMS:   Pertinent positives as noted in the HPI. All other systems reviewed and negative. PHYSICAL EXAM:  /77   Pulse 114   Temp 98.8 °F (37.1 °C) (Oral)   Resp 17   Wt 244 lb (110.7 kg)   SpO2 99%   BMI 47.65 kg/m²   General appearance: No apparent distress, appears stated age and cooperative. HEENT: Normal cephalic, atraumatic without obvious deformity. Pupils equal, round, and reactive to light. Extra ocular muscles intact. Conjunctivae/corneas clear. Neck: Supple, with full range of motion. No jugular venous distention. Trachea midline. Respiratory: Clear to auscultation bilaterally  Cardiovascular: Tachycardic, irregularly irregular  Abdomen: Soft, nontender, nondistended  Musculoskeletal: No clubbing, cyanosis, edema of bilateral lower extremities. Brisk capillary refill. Skin: Normal skin color. No rashes or lesions. Neurologic:  Neurovascularly intact without any focal sensory/motor deficits. Cranial nerves: II-XII intact, grossly non-focal.  Reviewed EKG and CXR personally      CBC:   Recent Labs     05/11/22  1715   WBC 5.7   RBC 3.61   HGB 9.3*   HCT 31.9*   MCV 88.4   RDW 17.2*        BMP:   Recent Labs     05/11/22  1715      K 4.5   CL 99   CO2 30*   BUN 24*   CREATININE 1.3*     LFT:  Recent Labs     05/11/22  1715   PROT 6.2*   ALKPHOS 124*   ALT <5   AST 8   BILITOT 0.6     CE:  No results for input(s): Verlena Harsh in the last 72 hours. PT/INR: No results for input(s): INR, APTT in the last 72 hours. BNP: No results for input(s): BNP in the last 72 hours.   ESR:   Lab Results   Component Value Date    SEDRATE 15 10/24/2021 CRP:   Lab Results   Component Value Date    CRP 0.7 (H) 10/25/2021     D Dimer:   Lab Results   Component Value Date    DDIMER <200 04/14/2022      Folate and B12:   Lab Results   Component Value Date    YEPVDIZY23 850 10/24/2021   ,   Lab Results   Component Value Date    FOLATE 18.0 10/24/2021     Lactic Acid:   Lab Results   Component Value Date    LACTA 1.2 11/13/2021     Thyroid Studies:   Lab Results   Component Value Date    TSH 1.030 04/14/2022    W4SWODF 8.7 07/11/2016       Oupatient labs:  Lab Results   Component Value Date    CHOL 97 04/15/2022    TRIG 171 (H) 04/15/2022    HDL 46 04/15/2022    LDLCALC 17 04/15/2022    TSH 1.030 04/14/2022    INR 1.3 04/15/2022    LABA1C 6.9 (H) 03/16/2022       Urinalysis:    Lab Results   Component Value Date    NITRU Negative 04/14/2022    WBCUA 0-1 04/14/2022    BACTERIA FEW 04/14/2022    RBCUA 0-1 04/14/2022    RBCUA NONE 03/25/2013    BLOODU Negative 04/14/2022    SPECGRAV 1.015 04/14/2022    GLUCOSEU Negative 04/14/2022       Imaging:  ECHO Transesophageal    Result Date: 4/18/2022  Transesophageal Echocardiography Report (PAT)   Demographics   Patient Name      Ed Keyana Gender              Female                    D   Medical Record    [id number]        Room Number         0406  Number   Account #         [de-identified]       Procedure Date      04/18/2022   Corporate ID                      Ordering Physician  Lisbet Smith MD   Accession Number  [id number]      Referring Physician   Date of Birth     1950      Sonographer         JADIEL Greene Anthony   Age               66 year(s)      Interpreting        Lisbet Smith MD                                    Physician                                     Any Other  Procedure Type of Study   PAT procedure:Transesophageal Echo (PAT). Procedure Date Date: 04/18/2022 Start: 10:27 AM Study Location: OR Technical Quality: Adequate visualization Patient Status: Routine Contrast Medium: Bubble Study. Height: 60 inches Weight: 244 pounds BSA: 2.03 m^2 BMI: 47.65 kg/m^2 HR: 113 bpm BP: 140/80 mmHg PAT Performed By: the attending and the sonographer  Type of Anesthesia: Moderate sedation   Findings   Left Ventricle  Normal left ventricle size and systolic function. Ejection fraction is visually estimated at 60%. No regional wall motion abnormalities seen. Right Ventricle  Normal right ventricular size and function. Left Atrium  Moderately dilated left atrium. No evidence of thrombus within left atrium or appendage. Agitated saline injected for shunt evaluation. No evidence of patent foramen ovale on bubble study. NAIF emptying velocity 18 cm/sec. Right Atrium  Mildly enlarged right atrium size. Mitral Valve  Normal mitral valve structure and function. Mild mitral regurgitation is present. No evidence of mitral valve stenosis. Tricuspid Valve  The tricuspid valve appears structurally normal.  Mild tricuspid regurgitation. RVSP is 32 mmHg. Normal estimated PA systolic pressure. Aortic Valve  Structurally normal aortic valve. The aortic valve is trileaflet. No hemodynamically significant aortic stenosis is present. No evidence of aortic valve regurgitation. Pulmonic Valve  The pulmonic valve was not well visualized. No evidence of any pulmonic regurgitation. Pericardial Effusion  No evidence for hemodynamically significant pericardial effusion. Pleural Effusion  No evidence of pleural effusion. Aorta  Aortic root dimension within normal limits. Miscellaneous  Inferior Vena Cava not well visualized. Conclusions   Summary  Normal left ventricle size and systolic function. Ejection fraction is visually estimated at 60%. No regional wall motion abnormalities seen. Moderately dilated left atrium. No evidence of thrombus within left atrium or appendage. Agitated saline injected for shunt evaluation. No evidence of patent foramen ovale on bubble study.   NAIF emptying velocity 18 cm/sec. Normal right ventricular size and function. Mild mitral regurgitation is present. No hemodynamically significant aortic stenosis is present. Mild tricuspid regurgitation. RVSP is 32 mmHg. Normal estimated PA systolic pressure. No evidence for hemodynamically significant pericardial effusion. Signature   ----------------------------------------------------------------  Electronically signed by Brianna Erwin MD(Interpreting  physician) on 04/18/2022 07:04 PM  ----------------------------------------------------------------  M-Mode/2D Measurements & Calculations     AO Root Dimension: 3.4 cm  Doppler Measurements & Calculations                                              Estimated RVSP: 32 mmHg                                             Estimated RAP:3 mmHg    Estimated PASP: 32.25 mmHg                TR Velocity:2.46 m/s                                             TR Gradient:24.25 mmHg  [email or web address] [id number]    CT Head WO Contrast    Result Date: 4/14/2022  EXAMINATION: CT OF THE HEAD WITHOUT CONTRAST  4/14/2022 3:10 pm TECHNIQUE: CT of the head was performed without the administration of intravenous contrast. Dose modulation, iterative reconstruction, and/or weight based adjustment of the mA/kV was utilized to reduce the radiation dose to as low as reasonably achievable. COMPARISON: March 06/20/2022 HISTORY: ORDERING SYSTEM PROVIDED HISTORY: fall TECHNOLOGIST PROVIDED HISTORY: Reason for exam:->fall Has a \"code stroke\" or \"stroke alert\" been called? ->No Decision Support Exception - unselect if not a suspected or confirmed emergency medical condition->Emergency Medical Condition (MA) FINDINGS: BRAIN/VENTRICLES: There are mild age related cortical atrophy and periventricular white matter ischemic changes. There is no acute intracranial hemorrhage, mass effect or midline shift. No abnormal extra-axial fluid collection. The gray-white differentiation is maintained without evidence of an acute infarct. There is no evidence of hydrocephalus. ORBITS: The visualized portion of the orbits demonstrate no acute abnormality. SINUSES: The visualized paranasal sinuses and mastoid air cells demonstrate no acute abnormality. SOFT TISSUES/SKULL:  No acute abnormality of the visualized skull or soft tissues. No acute intracranial abnormality or hemorrhage. CT Cervical Spine WO Contrast    Result Date: 4/14/2022  EXAMINATION: CT OF THE CERVICAL SPINE WITHOUT CONTRAST 4/14/2022 3:10 pm TECHNIQUE: CT of the cervical spine was performed without the administration of intravenous contrast. Multiplanar reformatted images are provided for review. Dose modulation, iterative reconstruction, and/or weight based adjustment of the mA/kV was utilized to reduce the radiation dose to as low as reasonably achievable. COMPARISON: March 26, 2022 HISTORY: ORDERING SYSTEM PROVIDED HISTORY: fall TECHNOLOGIST PROVIDED HISTORY: Reason for exam:->fall Decision Support Exception - unselect if not a suspected or confirmed emergency medical condition->Emergency Medical Condition (MA) FINDINGS: BONES/ALIGNMENT: There is no acute fracture or traumatic malalignment. There is straightening of normal lordotic curvature likely due to muscular spasm and/or positioning of the neck. DEGENERATIVE CHANGES: No significant degenerative changes. SOFT TISSUES: There is no prevertebral soft tissue swelling. No acute abnormality of the cervical spine. CT ABDOMEN PELVIS W IV CONTRAST Additional Contrast? Oral    Result Date: 4/17/2022  EXAMINATION: CT OF THE ABDOMEN AND PELVIS WITH CONTRAST 4/17/2022 7:46 pm TECHNIQUE: CT of the abdomen and pelvis was performed with the administration of intravenous contrast. Multiplanar reformatted images are provided for review.  Dose modulation, iterative reconstruction, and/or weight based adjustment of the mA/kV was utilized to reduce the radiation dose to as low as reasonably achievable. COMPARISON: 02/04/2021. HISTORY: ORDERING SYSTEM PROVIDED HISTORY: right sided pain. low BP. On Eliquis. R/O retroperitoneal bleed TECHNOLOGIST PROVIDED HISTORY: Reason for exam:->right sided pain. low BP. On Eliquis. R/O retroperitoneal bleed Additional Contrast?->Oral FINDINGS: The lung bases demonstrate atelectasis and  infiltrates in lung bases likely CHF and or pneumonia. There is diffuse coronary artery calcification. There is hepatosplenomegaly with liver measuring 17.5 cm which is fatty and spleen measuring 15 cm. Gallbladder is absent. Pancreas, the adrenals and the kidneys are normal.  There is no retroperitoneal hematoma. Degenerative changes are identified in the lumbar spine. Pelvis. The bladder is partially contracted with mild wall thickening. Please correlate with urinalysis. Colon is partially collapsed. Appendix is partially identified. Inflammatory stranding densities are identified in the abdomen pelvis in the subcutaneous tissue more on the right lateral abdomen and pelvis. There is no retroperitoneal hematoma. Infiltrates and pleural effusion lung bases likely CHF and or pneumonia. Anasarca with inflammatory changes in the right lateral abdominal wall. RECOMMENDATIONS: Unavailable     XR CHEST PORTABLE    Result Date: 5/11/2022  EXAMINATION: ONE XRAY VIEW OF THE CHEST 5/11/2022 8:06 pm COMPARISON: 04/14/2022 HISTORY: ORDERING SYSTEM PROVIDED HISTORY: short of breath TECHNOLOGIST PROVIDED HISTORY: Reason for exam:->short of breath What reading provider will be dictating this exam?->CRC FINDINGS: The lungs are without acute focal process. There is no effusion or pneumothorax. Cardiomegaly. The osseous structures are without acute process. No acute process.      XR CHEST PORTABLE    Result Date: 4/14/2022  EXAMINATION: ONE XRAY VIEW OF THE CHEST PORTABLE UPRIGHT 4/14/2022 3:10 pm COMPARISON: 03/16/2022 HISTORY: ORDERING SYSTEM PROVIDED HISTORY: left rib pain TECHNOLOGIST PROVIDED HISTORY: Reason for exam:->left rib pain FINDINGS: Chest evaluation partly limited by portable technique and large body habitus. Mild cardiomegaly, unchanged. Cephalization of the pulmonary vascularity within the right upper lobe in keeping with mild pulmonary venous congestion. No focal infiltrate or pleural effusion. No pneumothorax. Cardiomegaly and mild pulmonary venous congestion. No pneumonia. Fluoroscopy modified barium swallow with video    Result Date: 4/15/2022  EXAMINATION: MODIFIED BARIUM SWALLOW WAS PERFORMED IN CONJUNCTION WITH SPEECH PATHOLOGY SERVICES WITH RONN UNIT DICOM DISPLAY TECHNIQUE: Fluoroscopic evaluation of the swallowing mechanism was performed using cineradiography with multiple consistency of barium product in conjunction with speech pathology services. FLUOROSCOPY DOSE AND TYPE OR TIME AND EXPOSURES: Images: Cine fluoroscopy Fluoroscopic time: 1.4 minutes Total dose: 26.67 mGy COMPARISON: None HISTORY: ORDERING SYSTEM PROVIDED HISTORY: difficulty swallowing TECHNOLOGIST PROVIDED HISTORY: Reason for exam:->difficulty swallowing FINDINGS: Oral phase mild swallowing delay. No significant barium residuals. Satisfactory pharyngeal phase of the swallow with the exception of aspiration of thin liquid barium when using a straw. Swallows were otherwise satisfactory. In     Thin liquid aspiration when using a straw. Otherwise satisfactory swallowing mechanism. Please see separate speech pathology report for full discussion of findings and recommendations.        ASSESSMENT:  -Acute on chronic congestive heart failure  -Atrial fibrillation with rapid ventricular response  -Hallucinations  -Anxiety/depression  -Hypertension  -Type 2 diabetes  -Hyperlipidemia  -CKD stage III      PLAN:  -Admit to medicine  -Consult psychiatry  -Telemetry  -Lasix 40 mg IV twice daily  -Daily weights and I's and O's  -Low-dose correction insulin  -Continue home medication        Diet: No diet orders on file  Code Status: Prior  Surrogate decision maker confirmed with patient:   Extended Emergency Contact Information  Primary Emergency Contact: Gil Friedman [address] Phone: [phone]  Mobile Phone: [phone]  Relation: Child   needed? No  Secondary Emergency Contact: Yessi Rosales  Mobile Phone: [phone]  Relation: Child   needed? No    DVT Prophylaxis: []Lovenox []Heparin []PCD [] [address] []Encouraged ambulation  Disposition: []Med/Surg [] Intermediate [] ICU/CCU  Admit status: [] Observation [] Inpatient     +++++++++++++++++++++++++++++++++++++++++++++++++  Mariluz Villanueva, DO  +++++++++++++++++++++++++++++++++++++++++++++++++  NOTE: This report was transcribed using voice recognition software. Every effort was made to ensure accuracy; however, inadvertent computerized transcription errors may be present.

## 2022-05-12 NOTE — ED NOTES
Both this RN and  have answered calls from patient's daughter who is demanding information. Noted in chart previous note that states patient requesting no information given. No information given by this RN.       Esequiel Pan RN  05/11/22 1966

## 2022-05-12 NOTE — CONSULTS
Patient personally seen and examined by me mental status examination performed. I agree the below assessment by the medical student. Psychomotor evaluation revealed no agitation retardation any abnormal movements. Her eye contact is fair her speech is normal rate rhythm and tone. Her mood is \"I am fine. \"  Affect is mood congruent appropriate pleasant. Thought process is linear without flight of ideas loose associations. Thought contents with visual hallucinations of seeing bugs. She denies suicidal homicidal ideations intent or plan her impulse control is adequate her cognitive function seems to be at her baseline her insight judgment is limited she is alert oriented time place and person            Behavioral Health Unit    Consulted By: Dr. Lawrence Palacio    Reason for consult: Hallucinations     Chief Complaint: \"I'm here for my heart\"    HPI: Pt is a 66year-old single, retired,  female currently living at [facility] in [place], [place], with a significant past history of Parkinson's, CHF, DM, HLD, HTN, CKD Stage III, and AFIB, 1 psychiatric hospitalization for SI (2014), no suicide attempts, or overdoses presented to the [facility] ED by ambulance for hallucinations. She was recently hospitalized for new-onset Afib 04/14-04/19/22. Pt was found to be in Afib with RVR in the ED, UDS was negative, EtOH level <10, and QTC N/A. Her initial labs showed Cr 1.3, BUN 24, GFR 40, Alk phos 124, Pro-BNP >7325, Troponin 21, UA had protein, trace leukocyte esterase, and few bacteria. Pt's medications were resumed today and she is currently on a diltiazem drip. She was given 1x dose of xanax 0.5 mg at 0216. Upon evaluation today, pt denies any AVH, SI, intent, plan, or HI. She reports she was brought in because of her \"heart''. She was resting comfortably in bed and eating breakfast. She c/o of changes to speech, where she feels she is \"struggling\" to talk.      She admits decreased interest in things for the last two weeks. She denies changes to interest, guilt, energy, concentration, psychomotor agitation/slowing, distractibility, impulsiveness, grandiosity, or flight of ideas. She does have Cluster B traits such as feeling empty inside, unable to recognize self, and mood swings. She denies any psychotic symptoms. She denies AVH, SI, denies intent or plan, and HI. Cognitive function, concentration, and memory seem to be at baseline based on conversation and serial 3. Fair insight, fair judgement, and fair impulse control. Alert and oriented to time, place, person, and situation. Past Psychiatric History: Pt goes to [employer] [facility] in [place], [place]. Her psychiatric medications include: lamictal, effexor, doxepin, buspar, carbidopa-levodopa, and ropinirole. She has 1 past psychiatric hospitalization at [facility] in 2014 for SI. No suicide attempts or overdoses. Family Psychiatric History: Denies    Allergies: Ciprofloxacin and codeine    Substance Use: Denies    Legal History: Denies    Personal/Family/Social: Pt lives alone at a senior home in [place], [place]. She has 7 kids, the oldest, Elvin Verma is whom she calls for help. Abuse: Denies    Head Trauma: Recently hospitalized for fall with head impact on 4/19/22 (CT head showed no acute intracranial abnormality)    MSE: Mental status exam reveals a 66year-old female that appears stated age sitting in the bed, with decent hygiene and grooming, and is forthcoming in revealing information. The patient is pleasant and cooperative. Psychomotor revealed no agitation, retardation, or any other abnormal or odd posture. Speech was coherent, with normal rate rhythm and tone. Patient responded in an appropriate amount of time. Eye contact was good.  Mood is \"I am good,\" affect mood congruent and euthymic.     Thought process is goal directed, linear without flights of ideas or looseness of association. Thought contents are devoid of any AVH, delusions, or any other perceptual abnormalities,. Patient does not seem to be internally stimulated, paranoid, or suspicious. Patient denies thought broadcasting, ideas of reference, and inception.  Patient denies SI, intent or plan, and denies HI. Cognitive function, concentration, and memory seem to be at baseline based on conversation and serial 3. Fair insight, fair judgement. Fair impulse control. Alert and oriented to time, place, person, and situation. Clinical impression:  Delirium (resolving)    Plans recommendations: Patient is not suicidal homicidal psychotic or manic she does not make her tearful patient level psychiatric treatment. Patient presented to the ED with A. fib RVR heart rates in the 120s to 140s also creatinine 1.3. Discussed with Dr. Charles Dong. Patient is a history of hypertension we will decrease the Effexor XR to 75 mg daily, would not recommend continuing the doxepin due to patient's heart failure, will start melatonin 3 mg at 6 PM to help reset the sleep wake cycle. Patient's delirium seems to be resolving we will not recommend starting melatonin at this time however we will recommend early infrequent ambulation when possible. Psychiatry recommend extreme caution with the use of benzodiazepines specifically Xanax as a highly addicting medication can cause falls confusion in the elderly. It does not appear per patient's [facility] report that she was not getting this medication regularly psychiatry has concerns about the continued use of this medication.       Austin Govea

## 2022-05-12 NOTE — PROGRESS NOTES
Nutrition Education      Provided educational handouts and sample meal plans on heart healthy/diabetic diet. Information in discharge instruction. Recommend outpatient nutritional counseling for further education.        Jaciel Vazquez RD, LD  Contact Number: [phone]

## 2022-05-12 NOTE — DISCHARGE INSTR - DIET
Good nutrition is important when healing from an illness, injury, or surgery. Follow any nutrition recommendations given to you during your hospital stay. If you were given an oral nutrition supplement while in the hospital, continue to take this supplement at home. You can take it with meals, in-between meals, and/or before bedtime. These supplements can be purchased at most local grocery stores, pharmacies, and chain super-stores. If you have any questions about your diet or nutrition, call the hospital and ask for the dietitian. A heart-healthy and consistent carbohydrate diet is recommended to manage heart disease and diabetes. To follow a heart-healthy and consistent carbohydrate diet,    Eat a balanced diet with whole grains, fruits and vegetables, and lean protein sources. Choose heart-healthy unsaturated fats. Limit saturated fats, trans fats, and cholesterol intake. Eat more plant-based or vegetarian meals using beans and soy foods for protein. Eat whole, unprocessed foods to limit the amount of sodium (salt) you eat. Choose a consistent amount of carbohydrate at each meal and snack. Limit refined carbohydrates especially sugar, sweets and sugar-sweetened beverages. If you drink alcohol, do so in moderation: one serving per day (women) and two servings per day (men). o One serving is equivalent to 12 ounces beer, 5 ounces wine, or 1.5 ounces distilled spirits    Tips  Tips for Choosing Heart-Healthy Fats  Choose lean protein and low-fat dairy foods to reduce saturated fat intake. Saturated fat is usually found in animal-based protein and is associated with certain health risks. Saturated fat is the biggest contributor to raise low-density lipoprotein (LDL) cholesterol levels. Research shows that limiting saturated fat lowers unhealthy cholesterol levels. Eat no more than 7% of your total calories each day from saturated fat.  Ask your RDN to help you determine how much saturated fat is right for you. There are many foods that do not contain large amounts of saturated fats. Swapping these foods to replace foods high in saturated fats will help you limit the saturated fat you eat and improve your cholesterol levels. You can also try eating more plant-based or vegetarian meals. Instead of    Try:    Whole milk, cheese, yogurt, and ice cream    1% or skim milk, low-fat cheese, non-fat yogurt, and low-fat ice cream    Fatty, marbled beef and pork    Lean beef, pork, or venison    Poultry with skin    Poultry without skin    Butter, stick margarine    Reduced-fat, whipped, or liquid spreads    Coconut oil, palm oil    Liquid vegetable oils: corn, canola, olive, soybean and safflower oils      Avoid foods that contain trans fats. Trans fats increase levels of LDL-cholesterol. Hydrogenated fat in processed foods is the main source of trans fats in foods. Trans fats can be found in stick margarine, shortening, processed sweets, baked goods, some fried foods, and packaged foods made with hydrogenated oils. Avoid foods with partially hydrogenated oil on the ingredient list such as: cookies, pastries, baked goods, biscuits, crackers, microwave popcorn, and frozen dinners. Choose foods with heart healthy fats. Polyunsaturated and monounsaturated fat are unsaturated fats that may help lower your blood cholesterol level when used in place of saturated fat in your diet. Ask your RDN about taking a dietary supplement with plant sterols and stanols to help lower your cholesterol level. Research shows that substituting saturated fats with unsaturated fats is beneficial to cholesterol levels. Try these easy swaps:        Instead of    Try:    Butter, stick margarine, or solid shortening    Reduced-fat, whipped, or liquid spreads    Beef, pork, or poultry with skin         Fish and seafood    Chips, crackers, snack foods    Raw or unsalted nuts and seeds or nut butters    Hummus with vegetables    Avocado on toast    Coconut oil, palm oil    Liquid vegetable oils: corn, canola, olive, soybean and safflower oils         Limit the amount of cholesterol you eat to less than 200 milligrams per day. Cholesterol is a substance carried through the bloodstream via lipoproteins, which are known as transporters of fat. Some body functions need cholesterol to work properly, but too much cholesterol in the bloodstream can damage arteries and build up blood vessel linings (which can lead to heart attack and stroke). You should eat less than 200 milligrams cholesterol per day. People respond differently to eating cholesterol. There is no test available right now that can figure out which people will respond more to dietary cholesterol and which will respond less. For individuals with high intake of dietary cholesterol, different types of increase (none, small, moderate, large) in LDL-cholesterol levels are all possible. Food sources of cholesterol include egg yolks and organ meats such as liver, gizzards. Limit egg yolks to two to four per week and avoid organ meats like liver and gizzards to control cholesterol intake. Tips for Choosing Heart-Healthy Carbohydrates  Consume a consistent amount of carbohydrate  It is important to eat foods with carbohydrates in moderation because they impact your blood glucose level. Carbohydrates can be found in many foods such as:  Grains (breads, crackers, rice, pasta, and cereals)  Starchy Vegetables (potatoes, corn, and peas)  Beans and legumes  Milk, soy milk, and yogurt  Fruit and fruit juice  Sweets (cakes, cookies, ice cream, jam and jelly)  Your RDN will help you set a goal for how many carbohydrate servings to eat at your meals and snacks. For many adults, eating 3 to 5 servings of carbohydrate foods at each meal and 1 or 2 carbohydrate servings for each snack works well. Check your blood glucose level regularly.  It can tell you if you need to adjust when you eat carbohydrates. Choose foods rich in viscous (soluble) fiber  Viscous, or soluble, is found in the walls of plant cells. Viscous fiber is found only in plant-based foods. Eating foods with fiber helps to lower your unhealthy cholesterol and keep your blood glucose in range  Rich sources of viscous fiber include vegetables (asparagus, [place] sprouts, sweet potatoes, turnips) fruit (apricots, mangoes, oranges), legumes, and whole grains (barley, oats, and oat bran). As you increase your fiber intake gradually, also increase the amount of water you drink. This will help prevent constipation. If you have difficulty achieving this goal, ask your RDN about fiber laxatives. Choose fiber supplements made with viscous fibers such as psyllium seed husks or methylcellulose to help lower unhealthy cholesterol. Limit refined carbohydrates  There are three types of carbohydrates: starches, sugar, and fiber. Some carbohydrates occur naturally in food, like the starches in rice or corn or the sugars in fruits and milk. Refined carbohydrates--foods with high amounts of simple sugars--can raise triglyceride levels. High triglyceride levels are associated with coronary heart disease. Some examples of refined carbohydrate foods are table sugar, sweets, and beverages sweetened with added sugar. Tips for Reducing Sodium (Salt)  Although sodium is important for your body to function, too much sodium can be harmful for people with high blood pressure. As sodium and fluid buildup in your tissues and bloodstream, your blood pressure increases. High blood pressure may cause damage to other organs and increase your risk for a stroke. Even if you take a pill for blood pressure or a water pill (diuretic) to remove fluid, it is still important to have less salt in your diet. Ask your doctor and RDN what amount of sodium is right for you. Avoid processed foods. Eat more fresh foods.   Fresh fruits and vegetables are naturally low in sodium, as well as frozen vegetables and fruits that have no added juices or sauces. Fresh meats are lower in sodium than processed meats, such as collier, sausage, and hotdogs. Read the nutrition label or ask your  to help you find a fresh meat that is low in sodium. Eat less salt--at the table and when cooking. A single teaspoon of table salt has 2,300 mg of sodium. Leave the salt out of recipes for pasta, casseroles, and soups. Ask your RDN how to cook your favorite recipes without sodium  Be a smart . Look for food packages that say salt-free or sodium-free.  These items contain less than 5 milligrams of sodium per serving. Very low-sodium products contain less than 35 milligrams of sodium per serving. Low-sodium products contain less than 140 milligrams of sodium per serving. Beware for Unsalted or No Added Salt products. These items may still be high in sodium. Check the nutrition label. Add flavors to your food without adding sodium. Try lemon juice, lime juice, fruit juice or vinegar. Dry or fresh herbs add flavor. Try basil, bay leaf, dill, rosemary, parsley, mary, dry mustard, nutmeg, thyme, and paprika. Pepper, red pepper flakes, and cayenne pepper can add spice t your meals without adding sodium. Hot sauce contains sodium, but if you use just a drop or two, it will not add up to much. Buy a sodium-free seasoning blend or make your own at home. Additional Lifestyle Tips  Achieve and maintain a healthy weight. Talk with your RDN or your doctor about what is a healthy weight for you. Set goals to reach and maintain that weight. To lose weight, reduce your calorie intake along with increasing your physical activity. A weight loss of 10 to 15 pounds could reduce LDL-cholesterol by 5 milligrams per deciliter. Participate in physical activity. Talk with your health care team to find out what types of physical activity are best for you.  Set a plan to get about 30 minutes of exercise on most days.   Foods Recommended  Food Group    Foods Recommended    Grains    Whole grain breads and cereals, including whole wheat, barley, rye, buckwheat, corn, teff, quinoa, millet, amaranth, brown or wild rice, sorghum, and oats  Pasta, especially whole wheat or other whole grain types  [employer], quinoa or wild rice  Whole grain crackers, bread, rolls, pitas  Home-made bread with reduced-sodium baking soda    Protein Foods    Lean cuts of beef and pork (loin, leg, round, extra lean hamburger)  Skinless poultry  Fish  Venison and other wild game  Dried beans and peas  Nuts and nut butters  Meat alternatives made with soy or textured vegetable protein  Egg whites or egg substitute  Cold cuts made with lean meat or soy protein    Dairy    Nonfat (skim), low-fat, or 1%-fat milk  Nonfat or low-fat yogurt or cottage cheese  Fat-free and low-fat cheese    Vegetables    Fresh, frozen, or canned vegetables without added fat or salt    Fruits    Fresh, frozen, canned, or dried fruit    Oils    Unsaturated oils (corn, olive, peanut, soy, sunflower, canola)  Soft or liquid margarines and vegetable oil spreads  Salad dressings  Seeds and nuts  Avocado    Foods Not Recommended  Food Group    Foods Not Recommended    Grains    Breads or crackers topped with salt  Cereals (hot or cold) with more than 300 mg sodium per serving  Biscuits, cornbread, and other quick breads prepared with baking soda  Bread crumbs or stuffing mix from a store  High-fat bakery products, such as doughnuts, biscuits, croissants, Spanish pastries, pies, cookies  Instant cooking foods to which you add hot water and stir--potatoes, noodles, rice, etc.  Packaged starchy foods--seasoned noodle or rice dishes, stuffing mix, macaroni and cheese dinner  Snacks made with partially hydrogenated oils, including chips, cheese puffs, snack mixes, regular crackers, butter-flavored popcorn    Protein Foods    Higher-fat cuts of meats (ribs, t-bone steak, regular hamburger)  Krause, sausage, or hot dogs  Cold cuts, such as salami or bologna, deli meats, cured meats, corned beef  Organ meats (liver, brains, gizzards, sweetbreads)  Poultry with skin  Fried or smoked meat, poultry, and fish  Whole eggs and egg yolks (more than 2-4 per week)  Salted legumes, nuts, seeds, or nut/seed butters  Meat alternatives with high levels of sodium (>300 mg per serving) or saturated fat (>5 g per serving)    Dairy    Whole milk,?2% fat milk, buttermilk  Whole milk yogurt or ice cream  Cream  Half-&-half  Cream cheese  Sour cream  Cheese    Vegetables    Canned or frozen vegetables with salt, fresh vegetables prepared with salt, butter, cheese, or cream sauce  Fried vegetables  Pickled vegetables such as olives, pickles, or sauerkraut    Fruits    Fried fruits  Fruits served with butter or cream    Oils    Butter, stick margarine, shortening  Partially hydrogenated oils or trans fats  Tropical oils (coconut, palm, palm kernel oils)    Other    Candy, sugar sweetened soft drinks and desserts  Salt, sea salt, garlic salt, and seasoning mixes containing salt  Bouillon cubes  Ketchup, barbecue sauce, Worcestershire sauce, soy sauce, teriyaki sauce  Miso  Salsa  Pickles, olives, relish    Heart Healthy Consistent Carbohydrate Sample 1-Day Menu   Breakfast  1 cup cooked oatmeal (2 carbohydrate servings)  3/4 cup blueberries (1 carbohydrate serving)  1 ounce almonds  1 cup skim milk (1 carbohydrate serving)  1 cup coffee  Morning Snack  1 cup sugar-free nonfat yogurt (1 carbohydrate serving)  Lunch  2 slices whole-wheat bread (2 carbohydrate servings)  2 ounces lean turkey breast  1 ounce low-fat Swiss cheese  1 teaspoon mustard  1 slice tomato  1 lettuce leaf  1 small pear (1 carbohydrate serving)  1 cup skim milk (1 carbohydrate serving)  Afternoon Snack  1 ounce trail mix with unsalted nuts, seeds, and raisins (1 carbohydrate serving)  Evening Meal  3 ounces salmon  2/3 cup cooked brown rice (2 carbohydrate servings)  1 teaspoon soft margarine  1 cup cooked broccoli with 1/2 cup cooked carrots (1 carbohydrate serving  Carrots, cooked, boiled, drained, without salt  1 cup lettuce  1 teaspoon olive oil with vinegar for dressing  1 small whole grain roll (1 carbohydrate serving)  1 teaspoon soft margarine  1 cup unsweetened tea  Evening Snack  1 extra-small banana (1 carbohydrate serving)  Daily Sum  Nutrient Unit Value  Macronutrients  Energy kcal 1938  Energy kJ 8105  Protein g 110  Total lipid (fat) g 70  Carbohydrate, by difference g 232  Fiber, total dietary g 34  Sugars, total g 84  Minerals  Calcium, Ca mg 1359  Iron, Fe mg 12  Sodium, Na mg 1905  Vitamins  Vitamin C, total ascorbic acid mg 123  Vitamin A, IU IU 39666  Vitamin D   Lipids  Fatty acids, total saturated g 13  Fatty acids, total monounsaturated g 30  Fatty acids, total polyunsaturated g 21  Cholesterol mg 113  Heart Healthy Consistent Carbohydrate Vegan Sample 1-Day Menu   Breakfast  1 cup oatmeal, cooked (2 carbohydrate servings)  ¾ cup blueberries (1 carbohydrate serving)  11 almonds, without salt  1 cup soymilk fortified with calcium, vitamin B12, and vitamin D  1 cup coffee  Morning Snack  6 ounces soy yogurt (1½ carbohydrate servings)  Lunch  1 whole wheat bun(1½ carbohydrate servings)  1 black bean burger (1 carbohydrate serving)  2 slices tomatoes  2 leaves lettuce  1 teaspoon mustard  1 small pear (1½ carbohydrate servings)  1 cup green tea, unsweetened  Afternoon Snack  1/3 cup trail mix with nuts, seeds, and raisins, without salt (1½ carbohydrate servinga)  Evening Meal  ½ cup meatless chicken  2/3 cup brown rice, cooked (2 carbohydrate servings)  1 cup broccoli, cooked (2/3 carbohydrate serving)  ½ cup carrots, cooked (1/3 carbohydrate serving)  2 teaspoons olive oil  1 teaspoon balsamic vinegar  1 whole wheat dinner roll (1 carbohydrate serving)  1 teaspoon margarine, soft, tub  1 cup soymilk fortified with calcium, vitamin B12, and vitamin D  Evening Snack  1 extra small banana (1 carbohydrate serving)  1 tablespoon peanut butter  Daily Sum  Nutrient Unit Value  Macronutrients  Energy kcal 1989  Energy kJ 8315  Protein g 85  Total lipid (fat) g 78  Carbohydrate, by difference g 258  Fiber, total dietary g 42  Sugars, total g 68  Minerals  Calcium, Ca mg 1343  Iron, Fe mg 16  Sodium, Na mg 1805  Vitamins  Vitamin C, total ascorbic acid mg 167  Vitamin A, IU IU 34691  Vitamin D   Lipids  Fatty acids, total saturated g 12  Fatty acids, total monounsaturated g 31  Fatty acids, total polyunsaturated g 25  Cholesterol mg 0  Heart Healthy Consistent Carbohydrate Vegetarian (Lacto-Ovo) Sample 1-Day Menu   Breakfast  1 cup oatmeal, cooked (2 carbohydrate servings)  ¾ cup blueberries (1 carbohydrate serving)  11 almonds, without salt  1 cup 1% milk (1 carbohydrate serving)  1 cup coffee  Morning Snack  1 cup fat-free plain yogurt (1 carbohydrate serving)  Lunch  1 whole wheat bun (1½ carbohydrate servings)  1 black bean burger (1 carbohydrate servings)  1 slice cheddar cheese, low sodium  2 slices tomatoes  2 leaves lettuce  1 teaspoon mustard  1 small pear (1½ carbohydrate servings)  1 cup green tea, unsweetened  Afternoon Snack  1/3 cup trail mix with nuts, seeds, and raisins, without salt (1½ carbohydrate servinga)  Evening Meal  ½ cup meatless chicken  2/3 cup brown rice, cooked (2 carbohydrate servings)  1 cup broccoli, cooked (2/3 carbohydrate serving)  ½ cup carrots, cooked (1/3 carbohydrate serving)  2 teaspoons olive oil  1 teaspoon balsamic vinegar  1 whole wheat dinner roll (1 carbohydrate serving)  1 teaspoon margarine, soft, tub  1 cup 1% milk (1 carbohydrate serving)  Evening Snack  1 extra small banana (1 carbohydrate serving)  1 tablespoon peanut butter  Daily Sum  Nutrient Unit Value  Macronutrients  Energy kcal 2093  Energy kJ 8755  Protein g 97  Total lipid (fat) g 81  Carbohydrate, by difference g 269  Fiber, total dietary g 39  Sugars, total g 96  Minerals  Calcium, Ca mg 1571  Iron, Fe mg 15  Sodium, Na mg 1950  Vitamins  Vitamin C, total ascorbic acid mg 139  Vitamin A, IU IU 78805  Vitamin D   Lipids  Fatty acids, total saturated g 20  Fatty acids, total monounsaturated g 33  Fatty acids, total polyunsaturated g 20  Cholesterol mg 57

## 2022-05-13 LAB
ANION GAP SERPL CALCULATED.3IONS-SCNC: 11 MMOL/L (ref 7–16)
BUN BLDV-MCNC: 25 MG/DL (ref 6–23)
CALCIUM SERPL-MCNC: 8.5 MG/DL (ref 8.6–10.2)
CHLORIDE BLD-SCNC: 96 MMOL/L (ref 98–107)
CO2: 35 MMOL/L (ref 22–29)
CREAT SERPL-MCNC: 1.5 MG/DL (ref 0.5–1)
GFR AFRICAN AMERICAN: 41
GFR NON-AFRICAN AMERICAN: 34 ML/MIN/1.73
GLUCOSE BLD-MCNC: 54 MG/DL (ref 74–99)
MAGNESIUM: 2.2 MG/DL (ref 1.6–2.6)
METER GLUCOSE: 138 MG/DL (ref 74–99)
METER GLUCOSE: 298 MG/DL (ref 74–99)
METER GLUCOSE: 78 MG/DL (ref 74–99)
METER GLUCOSE: 97 MG/DL (ref 74–99)
POTASSIUM SERPL-SCNC: 4.3 MMOL/L (ref 3.5–5)
SODIUM BLD-SCNC: 142 MMOL/L (ref 132–146)

## 2022-05-13 PROCEDURE — 94640 AIRWAY INHALATION TREATMENT: CPT

## 2022-05-13 PROCEDURE — 80048 BASIC METABOLIC PNL TOTAL CA: CPT

## 2022-05-13 PROCEDURE — 6360000002 HC RX W HCPCS: Performed by: FAMILY MEDICINE

## 2022-05-13 PROCEDURE — 2140000000 HC CCU INTERMEDIATE R&B

## 2022-05-13 PROCEDURE — 82962 GLUCOSE BLOOD TEST: CPT

## 2022-05-13 PROCEDURE — 36415 COLL VENOUS BLD VENIPUNCTURE: CPT

## 2022-05-13 PROCEDURE — 6360000002 HC RX W HCPCS: Performed by: INTERNAL MEDICINE

## 2022-05-13 PROCEDURE — 6370000000 HC RX 637 (ALT 250 FOR IP): Performed by: HOSPITALIST

## 2022-05-13 PROCEDURE — 99223 1ST HOSP IP/OBS HIGH 75: CPT | Performed by: INTERNAL MEDICINE

## 2022-05-13 PROCEDURE — 2700000000 HC OXYGEN THERAPY PER DAY

## 2022-05-13 PROCEDURE — 6370000000 HC RX 637 (ALT 250 FOR IP): Performed by: INTERNAL MEDICINE

## 2022-05-13 PROCEDURE — 6370000000 HC RX 637 (ALT 250 FOR IP): Performed by: FAMILY MEDICINE

## 2022-05-13 PROCEDURE — 83735 ASSAY OF MAGNESIUM: CPT

## 2022-05-13 PROCEDURE — S5553 INSULIN LONG ACTING 5 U: HCPCS | Performed by: FAMILY MEDICINE

## 2022-05-13 PROCEDURE — 2580000003 HC RX 258: Performed by: FAMILY MEDICINE

## 2022-05-13 PROCEDURE — 6370000000 HC RX 637 (ALT 250 FOR IP): Performed by: NURSE PRACTITIONER

## 2022-05-13 RX ORDER — AMIODARONE HYDROCHLORIDE 200 MG/1
200 TABLET ORAL DAILY
Status: DISCONTINUED | OUTPATIENT
Start: 2022-05-21 | End: 2022-05-16

## 2022-05-13 RX ORDER — DIGOXIN 0.25 MG/ML
250 INJECTION INTRAMUSCULAR; INTRAVENOUS ONCE
Status: COMPLETED | OUTPATIENT
Start: 2022-05-13 | End: 2022-05-13

## 2022-05-13 RX ORDER — METOPROLOL SUCCINATE 25 MG/1
25 TABLET, EXTENDED RELEASE ORAL ONCE
Status: COMPLETED | OUTPATIENT
Start: 2022-05-13 | End: 2022-05-13

## 2022-05-13 RX ORDER — AMIODARONE HYDROCHLORIDE 200 MG/1
200 TABLET ORAL 3 TIMES DAILY
Status: DISCONTINUED | OUTPATIENT
Start: 2022-05-13 | End: 2022-05-16

## 2022-05-13 RX ORDER — METOPROLOL SUCCINATE 50 MG/1
50 TABLET, EXTENDED RELEASE ORAL 2 TIMES DAILY
Status: DISCONTINUED | OUTPATIENT
Start: 2022-05-13 | End: 2022-05-16

## 2022-05-13 RX ADMIN — ATORVASTATIN CALCIUM 20 MG: 20 TABLET, FILM COATED ORAL at 09:34

## 2022-05-13 RX ADMIN — ALPRAZOLAM 0.5 MG: 0.25 TABLET ORAL at 20:28

## 2022-05-13 RX ADMIN — CARBIDOPA AND LEVODOPA 2 TABLET: 25; 100 TABLET, EXTENDED RELEASE ORAL at 13:47

## 2022-05-13 RX ADMIN — METOPROLOL SUCCINATE 50 MG: 50 TABLET, EXTENDED RELEASE ORAL at 20:29

## 2022-05-13 RX ADMIN — BUSPIRONE HYDROCHLORIDE 5 MG: 5 TABLET ORAL at 20:28

## 2022-05-13 RX ADMIN — ARFORMOTEROL TARTRATE 15 MCG: 15 SOLUTION RESPIRATORY (INHALATION) at 08:04

## 2022-05-13 RX ADMIN — DIGOXIN 250 MCG: 0.25 INJECTION INTRAMUSCULAR; INTRAVENOUS at 15:49

## 2022-05-13 RX ADMIN — METOPROLOL SUCCINATE 25 MG: 25 TABLET, EXTENDED RELEASE ORAL at 09:34

## 2022-05-13 RX ADMIN — PANTOPRAZOLE SODIUM 40 MG: 40 TABLET, DELAYED RELEASE ORAL at 05:46

## 2022-05-13 RX ADMIN — Medication 10 ML: at 09:35

## 2022-05-13 RX ADMIN — FUROSEMIDE 40 MG: 10 INJECTION, SOLUTION INTRAMUSCULAR; INTRAVENOUS at 09:33

## 2022-05-13 RX ADMIN — BUDESONIDE 500 MCG: 0.5 SUSPENSION RESPIRATORY (INHALATION) at 19:28

## 2022-05-13 RX ADMIN — CARBIDOPA AND LEVODOPA 2 TABLET: 25; 100 TABLET, EXTENDED RELEASE ORAL at 09:34

## 2022-05-13 RX ADMIN — VENLAFAXINE HYDROCHLORIDE 75 MG: 75 CAPSULE, EXTENDED RELEASE ORAL at 09:33

## 2022-05-13 RX ADMIN — SODIUM CHLORIDE, PRESERVATIVE FREE 10 ML: 5 INJECTION INTRAVENOUS at 15:49

## 2022-05-13 RX ADMIN — ARFORMOTEROL TARTRATE 15 MCG: 15 SOLUTION RESPIRATORY (INHALATION) at 19:28

## 2022-05-13 RX ADMIN — INSULIN LISPRO 2 UNITS: 100 INJECTION, SOLUTION INTRAVENOUS; SUBCUTANEOUS at 21:23

## 2022-05-13 RX ADMIN — Medication 10 ML: at 20:31

## 2022-05-13 RX ADMIN — BUSPIRONE HYDROCHLORIDE 5 MG: 5 TABLET ORAL at 13:47

## 2022-05-13 RX ADMIN — APIXABAN 5 MG: 5 TABLET, FILM COATED ORAL at 20:29

## 2022-05-13 RX ADMIN — CARBIDOPA AND LEVODOPA 2 TABLET: 25; 100 TABLET, EXTENDED RELEASE ORAL at 20:28

## 2022-05-13 RX ADMIN — APIXABAN 5 MG: 5 TABLET, FILM COATED ORAL at 09:34

## 2022-05-13 RX ADMIN — AMIODARONE HYDROCHLORIDE 200 MG: 200 TABLET ORAL at 20:29

## 2022-05-13 RX ADMIN — MONTELUKAST 10 MG: 10 TABLET, FILM COATED ORAL at 20:29

## 2022-05-13 RX ADMIN — ROPINIROLE HYDROCHLORIDE 1 MG: 1 TABLET, FILM COATED ORAL at 20:30

## 2022-05-13 RX ADMIN — Medication 3 MG: at 18:40

## 2022-05-13 RX ADMIN — INSULIN GLARGINE-YFGN 28 UNITS: 100 INJECTION, SOLUTION SUBCUTANEOUS at 21:24

## 2022-05-13 RX ADMIN — DIGOXIN 250 MCG: 0.25 INJECTION INTRAMUSCULAR; INTRAVENOUS at 18:40

## 2022-05-13 RX ADMIN — METOPROLOL SUCCINATE 25 MG: 25 TABLET, EXTENDED RELEASE ORAL at 14:01

## 2022-05-13 RX ADMIN — LAMOTRIGINE 200 MG: 100 TABLET ORAL at 09:33

## 2022-05-13 RX ADMIN — ROPINIROLE HYDROCHLORIDE 1 MG: 1 TABLET, FILM COATED ORAL at 09:33

## 2022-05-13 RX ADMIN — SODIUM CHLORIDE, PRESERVATIVE FREE 10 ML: 5 INJECTION INTRAVENOUS at 18:40

## 2022-05-13 RX ADMIN — BUSPIRONE HYDROCHLORIDE 5 MG: 5 TABLET ORAL at 09:33

## 2022-05-13 RX ADMIN — ROPINIROLE HYDROCHLORIDE 1 MG: 1 TABLET, FILM COATED ORAL at 13:47

## 2022-05-13 RX ADMIN — ISOSORBIDE MONONITRATE 30 MG: 30 TABLET, EXTENDED RELEASE ORAL at 09:34

## 2022-05-13 RX ADMIN — AMIODARONE HYDROCHLORIDE 200 MG: 200 TABLET ORAL at 15:49

## 2022-05-13 RX ADMIN — BUDESONIDE 500 MCG: 0.5 SUSPENSION RESPIRATORY (INHALATION) at 08:04

## 2022-05-13 ASSESSMENT — PAIN SCALES - GENERAL: PAINLEVEL_OUTOF10: 0

## 2022-05-13 NOTE — CONSULTS
Patient currently admitted with diagnosis of Diastolic heart failure. Patient was alert and oriented, hard to keep focused on topic during the consultation. She was engaged and asked appropriate questions throughout the education session. She is agreeable to self monitoring, following a low sodium diet, and establishing with her new PCP on 5/19/22. She states she uses her insurance company for transportation needs. Scheduling with the CHF clinic No: she does not follow with cardiology at this time, will discuss cardiology referral at time of PCP visit. .    Future Appointments   Date Time Provider Prateek Maday   5/19/2022 10:45 AM Tammy Franco MD [facility]            We reviewed the introduction to Heart Failure, the HF zones, signs and symptoms to report on day 1 of onset, medications, medication compliance, the importance of obtaining daily weights, following a low sodium diet, reading food labels for the sodium content, keeping physician appointments, and smoking cessation. We discussed writing / tracking daily weights on a calendar / log because a 5 pound gain in 1 week can sneak up if you are not tracking it. Contributing risk factors for Heart Failure are identified as psycho/social deficts, difficult family dynamics. I advised patient they can reduce the risk for Heart Failure exacerbations by modifying / controlling the risk factors. We discussed self-managed care which includes the following:  to take medications as prescribed, report any intolerable side effects of medications to the cardiologist / doctor, do not just stop taking the medication; follow a cardiac heart healthy / low sodium diet; weigh yourself daily, exercise regularly- per doctor recommendation and not to smoke or use an excess amount of alcohol. We discussed calling the cardiologist / doctor with a weight gain of 3 pounds in one day or a total of 5 pounds or more in one week.  Also, if you should have a significant weight loss of 3# or more in one day to call the doctor, they may need to decrease or hold the diuretic dose. On days you feels nauseated and not eating / drinking, having emesis or diarrhea,  informed to call the cardiologist  / doctor, they may need to decrease or hold diuretic to avoid dehydration. I stressed the importance of informing their cardiologist the first day of onset of any of the signs and symptoms in the \"Yellow Zone\" of the HF Zones. Patient verbalizes understanding. Greater than 30 minutes was spent educating patient. The Heart Failure Booklet given to the patient with additional patient education addressing:  · What is Heart Failure? · Things You Can Do to Live Well with HF  · How to Take Your Medications  · How to Eat Less Salt  · [place] its Salt? · Exercising Well with Heart Failure  · Signs and symptoms of HF to report  · Weight Yourself Each Day  · Home Self Management- activity, weight tracking, taking medications as prescribed, meals /diet planning (sodium and fluid restriction), how to read food labels, keeping physician follow ups, smoking cessation, follow the Heart Failure Zones  · The Heart Failure zones  · Every Dose Every Day      Instructed  to call 911 if you have any of the following symptoms:    ·    Struggling to breathe unrelieved with rest,  ·    Having chest pain  ·    Have confusion or cant think clearly      The patient is ordered:  Diet: ADULT DIET; Dysphagia - Minced and Moist; 3 carb choices (45 gm/meal);  Low Sodium (2 gm)   Sodium controlled diet Yes  Fluid restriction daily ordered (fluid restriction recommended if patient is hyponatremic and/or diuretic is initiated or increased) No  FR:   Daily Weights:   Patient Vitals for the past 96 hrs (Last 3 readings):   Weight   05/12/22 0229 244 lb (110.7 kg)   05/11/22 1705 244 lb (110.7 kg)     I/O:     Intake/Output Summary (Last 24 hours) at 5/13/2022 4767  Last data filed at 5/13/2022 3989  Gross marked azotemia  [] Other Contraindications    ARNI - Angiotensin Receptor Neprilysin Inhibitor ordered:  [] Yes  [x] No (specify contraindication):    [] ACE inhibitor use within the prior 36 hours  [] Allergy  [] Hyperkalemia  [] Hypotension  [] Renal dysfunction defined as creatinine > 2.5 mg/dL in men or > 2.0 mg/dL in women  [] Other Contraindications  [] Not Eligible  [] Not Tolerant  [] Patient Reason  []System Reason  []Other Reason      Beta Blocker (Carvedilol, Metoprolol Succinate, or Bisoprolol) ordered:    [x] Yes Toprol XL  [] No (specify contraindication):    [] Contraindicated  [] Asthma  [] Fluid Overload  [] Low Blood Pressure  [] Patient recently treated with an intravenous positive inotropic agent  [] Other Contraindications  [] Not Eligible  [] Not Tolerant  [] Patient Reason  [] System Reason    SGLT2 Inhibitor ordered:  [] Yes  [x] No (specify contraindication):    [] Contraindicated  [] Patient currently on dialysis  [] Ketoacidosis  [] Known hypersensitivity to the medication  [] Type I diabetes (not approved for use in patients with Type I diabetes due to increased risk of ketoacidosis)  [] Other Contraindications  [] Not Eligible  [] Not Tolerant  [] Patient Reason  [] System Reason  [] Other Reason    Aldosterone Antagonist ordered:  [] Yes  [x] No (specify contraindication):    [] Contraindicated  [] Allergy due to aldosterone receptor antagonist  [] Hyperkalemia  [] Renal dysfunction defined as creatinine >2.5 mg/dL in men or >2.0 mg/dL in women.   [] Other contraindications  [] Not Eligible  [] Not Tolerant  [] Patient Reason  [] System Reason  [] Other Reason

## 2022-05-13 NOTE — PROGRESS NOTES
Hospitalist Progress Note      SYNOPSIS: Patient admitted on 5/11/2022 for Heart failure [facility])      SUBJECTIVE:    Patient seen and examined  Records reviewed. No events overnight  Resting in bed  No fever or chills  No cp/ct/orthopnea    Continues with cardizem drip     DIET: ADULT DIET; Dysphagia - Minced and Moist; 3 carb choices (45 gm/meal); Low Sodium (2 gm)  CODE: Full Code    Intake/Output Summary (Last 24 hours) at 5/13/2022 1240  Last data filed at 5/13/2022 0622  Gross per 24 hour   Intake 455 ml   Output 2000 ml   Net -1545 ml       OBJECTIVE:    BP 99/82   Pulse 89   Temp 97.7 °F (36.5 °C) (Oral)   Resp 14   Wt 244 lb (110.7 kg)   SpO2 100%   BMI 47.65 kg/m²     General appearance: No apparent distress, appears stated age and cooperative. HEENT:  Conjunctivae/corneas clear. Neck: Supple. No jugular venous distention. Respiratory: Clear to auscultation bilaterally, normal respiratory effort  Cardiovascular: Regular rate rhythm, normal S1-S2  Abdomen: Soft, nontender, nondistended  Musculoskeletal: No clubbing, cyanosis, no bilateral lower extremity edema. Brisk capillary refill. Skin:  No rashes  on visible skin  Neurologic: awake, alert and following commands     ASSESSMENT:    Principal Problem:    Heart failure ([address]  Resolved Problems:    * No resolved hospital problems. *       PLAN:     68 y. o. year old [de-identified]  has a past medical history of Acute on chronic congestive heart failure ([address] Anxiety, Asthma, CAD (coronary artery disease), Cancer ([address] Chronic kidney disease, Depression, Diabetes mellitus ([address] H/O mammogram, Hx MRSA infection, Hyperlipidemia, Hypertension, Lateral epicondylitis, SERENA on CPAP, Parkinson's disease ([address] and Tubal ligation status.         Presented to the emergency department with concerns about hallucinations.  Family reports she has been hallucinating.  In the emergency department patient is not hallucinating.  Vital signs reveal the patient to be tachycardic with a rate in 130s.   EKG showed atrial fibrillation with RVR.  Patient has a history atrial fibrillation and is anticoagulated on Eliquis.       -Acute on chronic congestive heart failure  Admitted with volume overload  Diurese  Consult cardiology     -Atrial fibrillation with rapid ventricular response  Admitted on cardizem drip for rate control   Sc cardizem drip   Make toprol xl 50 mg bid  janes  Cardio consulted     -Hallucinations - Anxiety/depression  Psych consulted for assist     -Hypertension  Stable     -Type 2 diabetes  accucheck with coverage     -Hyperlipidemia  Statin     -CKD stage III  Follow creat    DISPOSITION: pending    Medications:  REVIEWED DAILY    Infusion Medications    dilTIAZem (CARDIZEM) 100 mg in dextrose 5% 100 mL (ADD-[facility]) 2.5 mg/hr (05/12/22 0223)    sodium chloride      dextrose       Scheduled Medications    budesonide  0.5 mg Nebulization BID    And    Arformoterol Tartrate  15 mcg Nebulization BID    venlafaxine  75 mg Oral Daily    melatonin  3 mg Oral Daily    apixaban  5 mg Oral BID    atorvastatin  20 mg Oral Daily    busPIRone  5 mg Oral TID    carbidopa-levodopa  2 tablet Oral TID    isosorbide mononitrate  30 mg Oral Daily    lamoTRIgine  200 mg Oral Daily    metoprolol succinate  25 mg Oral Daily    montelukast  10 mg Oral Nightly    pantoprazole  40 mg Oral QAM AC    rOPINIRole  1 mg Oral TID    sodium chloride flush  5-40 mL IntraVENous 2 times per day    furosemide  40 mg IntraVENous BID    insulin glargine  0.25 Units/kg SubCUTAneous Nightly    insulin lispro  0-6 Units SubCUTAneous TID WC    insulin lispro  0-3 Units SubCUTAneous Nightly     PRN Meds: albuterol, ALPRAZolam, ipratropium-albuterol, sodium chloride flush, sodium chloride, ondansetron **OR** ondansetron, polyethylene glycol, acetaminophen **OR** acetaminophen, glucose, dextrose bolus **OR** dextrose bolus, glucagon (rDNA), dextrose    Labs:     Recent Labs 05/11/22 1715 05/12/22 0144   WBC 5.7 5.4   HGB 9.3* 9.6*   HCT 31.9* 33.4*    206       Recent Labs     05/11/22  1715 05/12/22  0144 05/13/22  0450    143 142   K 4.5 4.4 4.3   CL 99 101 96*   CO2 30* 32* 35*   BUN 24* 23 25*   CREATININE 1.3* 1.4* 1.5*   CALCIUM 9.1 9.0 8.5*       Recent Labs     05/11/22 1715   PROT 6.2*   ALKPHOS 124*   ALT <5   AST 8   BILITOT 0.6       No results for input(s): INR in the last 72 hours. No results for input(s): Reyne Greek in the last 72 hours. Chronic labs:    Lab Results   Component Value Date    CHOL 95 05/12/2022    TRIG 93 05/12/2022    HDL 38 05/12/2022    LDLCALC 38 05/12/2022    TSH 1.030 04/14/2022    INR 1.3 04/15/2022    LABA1C 6.5 (H) 05/12/2022       Radiology: REVIEWED DAILY    +++++++++++++++++++++++++++++++++++++++++++++++++  PARAMVIR DO Taylor Garvey Physician - [address], [place]  +++++++++++++++++++++++++++++++++++++++++++++++++  NOTE: This report was transcribed using voice recognition software. Every effort was made to ensure accuracy; however, inadvertent computerized transcription errors may be present.

## 2022-05-13 NOTE — CONSULTS
Inpatient Cardiology Consultation      Reason for Consult:  Atrial fibrillation with RVR    Consulting Physician: Dr. Mily Rodriguez    Requesting Physician:  Dr. Osman Stein    Date of Consultation: 5/13/2022    History of Present Illness:   Ms. Anna Paz is a 66year old lady who is known to Dr. Shani Armstrong service; she was last seen in hospital consultation by Dr. Nishi Brennan in April 2022 at which time she was admitted with mechanical fall and found to be in atrial fibrillation. She was started on Eliquis and had successful PAT guided cardioversion on April 18, 2022. She was discharged subacute rehab on Toprol XL 25 mg twice daily and Eliquis 5 mg twice daily. She was brought to the ER on May 11 because her son thought she was hallucinating. She was thought to have acute exacerbation of congestive heart failure and rapid atrial fibrillation. She was treated with 40 mg IV Lasix. She was seen in consult by psychiatry and today cardiology was consulted for rapid atrial fibrillation. Upon arrival to the ED: Blood pressure 132/77, heart rate 131, afebrile, 94% on 2 L nasal cannula. Labs: Sodium 139, potassium 4.5, BUN 24, creatinine 1.3>>1.5 (today), proBNP 7325. High-sensitivity troponin 21, 20. Urine tox: Negative. WBC 5.7, H/H 9.3/31.9, platelet count 109. SARS-CoV-2: Negative. Influenza: Negative. UA: Trace leukocytes. X-ray: No acute process. EKG: A. fib with RVR. ER medications: Toprol-XL 25 mg. Past Medical History:    1. 08/10/11 [address] [place] [address]):  Normal Lexiscan portion.  No evidence for inducible ischemia.  No previous myocardial infarction.  Ejection fraction 77%. 2. Lexiscan MPS 04/25/2014: Reversible inferolateral wall perfusion defect. Finding suggests left ventricular myocardium at risk for stress-induced ischemia. EF >70%.    3. TTE 04/27/2014 (Dr. Will Reaves left ventricle size and systolic function. No wall motion abnormalities. Mild concentric left ventricular hypertrophy. Ejection fraction is visually estimated at >55%. Normal right ventricular size and function. Aortic root is sclerotic and calcified  4. Cardiac Catheterization (Dr. Calista Bowles) 04/28/2014: Left main: Briana Aida left main artery is a short vessel which did not appear to have any significant angiographic disease. Left anterior descending:  The left anterior descending artery is a moderate-sized vessel which has minor luminal irregularities in its middle segment but without any significant angiographic luminal narrowing.  The diagonal branches also did not appear to have any significant disease. Left circumflex:  The left circumflex is a large, codominant vessel which did not appear to have any significant angiographic disease. Right coronary artery:  The right coronary artery is a moderate-sized codominant vessel which did not appear to have any significant angiographic disease. LEFT VENTRICULOGRAPHY:  The left ventricle is normal in size and contractility with an estimated ejection fraction of 60% to 65%.  There was no mitral regurgitation. RIGHT FEMORAL ARTERY ANGIOGRAPHY:  The right common femoral artery and the proximal segments of the right superficial femoral artery and the right profunda did not appear to have any significant disease.   5. TTE 02/07/2021 (Dr. Lima Gtz left ventricle size and systolic function. Ejection fraction is visually estimated at 65-70%. No regional wall motion abnormalities seen. Moderate concentric left ventricular hypertrophy. Normal right ventricular size and function. No systolic mitral regurgitation noted. No hemodynamically significant aortic stenosis is present. Unable to estimate PA systolic pressure. No evidence for hemodynamically significant pericardial effusion. Valves were not well visualized, please consider PAT if clinical suspicion for endocarditis is high  6. Lexiscan MPS 01/10/2022: ECG during the infusion did not change.  The myocardial perfusion imaging was abnormal. The abnormality was a large sized, moderate fixed defect involving the inferior and inferolateral wall suggestive prior infarct without any reversibility. There was also a small sized mild perfusion defect involving the mid to distal anterior wall suggestive ischemia. Overall left ventricular systolic function was normal without regional wall motion abnormalities. No transient ischemic dilatation. High risk general pharmacologic stress test.  7. Cardiac catheterization 1/11/2022: CONCLUSIONS:  Coronary artery disease: Left main.  No significant disease. LAD. Geoffrey Higashi calcified proximal and mid vessel with 50% mid vessel stenosis.  60% proximal stenosis of a moderate size first diagonal branch.  LCX.  50% ostial narrowing of the AV groove branch in the mid vessel which is a bifurcation lesion with a large marginal branch which itself did not appear to have any significant disease.  The AV groove branch of the left circumflex continues to provide a posterior descending artery branch and the posterolateral branch (codominant vessel). RCA.  Codominant vessel with no significant angiographic disease. Normal left ventricular size with hyperdynamic left ventricular systolic function with an estimated ejection fraction of 75%. Systemic hypertension.  Elevated left ventricular end-diastolic pressure (18)  8. Echocardiogram 1/11/2022:  Left ventricle is normal in size.  Moderate concentric left ventricular hypertrophy. No regional wall motion abnormalities seen.  Ejection fraction is visually estimated at 70+/-5%.  There is doppler evidence of stage I diastolic dysfunction.  Mildly dilated right ventricle.  Right ventricle global systolic function is normal ( TAPSE = 1.8 ). Normal size atria.  No significant valvular abnormalities noted. 9. Morbid obesity.    10. Diabetes mellitus.    11. Hyperlipidemia. 12. Hypertension.    13. Asthma.    14. SERENA with noncompliance with Cpap  15.  Stomach ulcers.  10/29/13, status post EGD and total colonoscopy (Dr. Shirley Dubon):  Normal esophagus, normal stomach, normal duodenum.  Colonoscopy was completel normal to the ileocecal valve  16. Anxiety   17. Status post bilateral breast reduction surgery.    18. Right breast cancer, status post right lumpectomy with radiation therapy. 19. Status post tonsillectomy and adenoidectomy.    20. Status post tubal ligation. 21. Status post right tube and ovary surgical removal.  22. Status post cholecystectomy.    23. Status post surgical removal of adhesions. 24. Status post excision of sebaceous cyst from abdomen.    25. Status post  release of hammertoes bilaterally. 26. Status post lumbar laminectomy for herniated L4-L5 disk. 27. 08/03/13, bone biopsy:  Ulcerations with retrograde joint contracture of the proximal interphalangeal joint, 2nd digit of right and 3rd digit of left.  Status post deep soft tissue cultures with known biopsies and correction of deformity of the 2nd digit of the right and 3rd digit of left (Dr. Abhi Vasquez).     28. Iron-deficiency anemia.   29. Admitted in March 2022 with asthma exacerbation   30. Admitted in April 2022 with mechanical fall and rapid atrial fibrillation. She was started on Eliquis, beta blocker was titrated and she had successful PAT guided cardioversion. 32. PAT 4/18/22: Normal left ventricle size and systolic function. Ejection fraction is visually estimated at 60%. No regional wall motion abnormalities seen. Moderately dilated left atrium. No evidence of thrombus within left atrium or appendage. Agitated saline injected for shunt evaluation. No evidence of patent foramen ovale on bubble study. NAIF emptying velocity 18 cm/sec. Normal right ventricular size and function. Mild mitral regurgitation is present. No hemodynamically significant aortic stenosis is present. Mild tricuspid regurgitation. RVSP is 32 mmHg. Normal estimated PA systolic pressure.   No evidence for hemodynamically significant pericardial effusion     Social History:   Denies tobacco, alcohol, illicit drug use, and routine caffeine intake. Medications Prior to Admit:  Prior to Admission medications    Medication Sig Start Date End Date Taking? Authorizing Provider   apixaban (ELIQUIS) 5 MG TABS tablet Take 1 tablet by mouth 2 times daily 4/19/22   Jarod Fox MD   lidocaine 4 % external patch Place 1 patch onto the skin daily Leave on for 12 hrs. Take off for 12 hrs. 4/20/22   Jarod Fox MD   budesonide-formoterol [facility]) 160-4.5 MCG/ACT AERO Inhale 2 puffs into the lungs 2 times daily 3/19/22   Kaylen Macdonald, DO   ipratropium-albuterol (DUONEB) 0.5-2.5 (3) MG/3ML SOLN nebulizer solution Inhale 3 mLs into the lungs every 4 hours as needed for Shortness of Breath 3/19/22   Kaylen Macdonald, DO   albuterol sulfate HFA (PROVENTIL HFA) 108 (90 Base) MCG/ACT inhaler Inhale 2 puffs into the lungs every 6 hours as needed for Wheezing 3/19/22   Eulalio Lugo, DO   rOPINIRole (REQUIP) 1 MG tablet Take 1 tablet by mouth 3 times daily 2/8/22   Iwrin Market, APRN - CNP   carbidopa-levodopa (SINEMET CR)  MG per extended release tablet Take 1 tablet by mouth 3 times daily 1/26/22   [place] Market, APRN - CNP   isosorbide mononitrate (IMDUR) 30 MG extended release tablet Take 1 tablet by mouth daily  Patient not taking: Reported on 4/14/2022 1/11/22   Temi Hopkins MD   nitroGLYCERIN (NITROSTAT) 0.4 MG SL tablet up to max of 3 total doses. If no relief after 1 dose, call 911.   Patient not taking: Reported on 3/17/2022 1/11/22   Temi Hopkins MD   busPIRone (BUSPAR) 5 MG tablet Take 1 tablet by mouth 3 times daily  Patient not taking: Reported on 4/14/2022 1/11/22   Temi Hopkins MD   insulin glargine (LANTUS) 100 UNIT/ML injection vial Inject 10 Units into the skin nightly 1/11/22   Temi Hopkins MD   metoprolol succinate (TOPROL XL) 25 MG extended release tablet Take 1 tablet by mouth daily 1/12/22   Neftaly Proctor MD   montelukast (SINGULAIR) 10 MG tablet Take 10 mg by mouth nightly    Historical Provider, MD   ALPRAZolam Aldon Nones) 0.5 MG tablet Take 0.5 mg by mouth daily as needed for Anxiety. Historical Provider, MD   Cholecalciferol (VITAMIN D) 25 MCG TABS Take 1 tablet by mouth daily  Patient not taking: Reported on 4/14/2022 10/26/21   Inna Velasquez, APRN - CNP   doxepin (SINEQUAN) 10 MG capsule Take 10 mg by mouth nightly    Historical Provider, MD   venlafaxine (EFFEXOR XR) 150 MG extended release capsule Take 150 mg by mouth daily    Historical Provider, MD   omeprazole (PRILOSEC) 40 MG delayed release capsule Take 40 mg by mouth daily     Historical Provider, MD   blood glucose test strips (ACCU-CHEK RIGOBERTO PLUS) strip 1 each by In Vitro route 2 times daily Indications: DISP ACCU-CHEK As needed. Historical Provider, MD   lamoTRIgine (LAMICTAL) 200 MG tablet Take 200 mg by mouth daily  10/18/19   Historical Provider, MD   atorvastatin (LIPITOR) 20 MG tablet Take 1 tablet by mouth daily 10/21/19   Darvin Ross MD   insulin aspart (NOVOLOG FLEXPEN) 100 UNIT/ML injection pen INJECT 0-10 UNITS INTO THE SKIN THREE TIMES DAILY(BEFORE MEALS) SLIDING SCALE (MAX DAILY 30 UNITS)  Patient taking differently: Indications: med.  approved 6/1/19-7/29/20 INJECT 0-10 UNITS INTO THE SKIN THREE TIMES DAILY(BEFORE MEALS) SLIDING SCALE (MAX DAILY 30 UNITS) 7/29/19   Darvin Ross MD       Current Medications:    Current Facility-Administered Medications: metoprolol succinate (TOPROL XL) extended release tablet 50 mg, 50 mg, Oral, BID  albuterol (PROVENTIL) nebulizer solution 2.5 mg, 2.5 mg, Nebulization, Q6H PRN  budesonide (PULMICORT) nebulizer suspension 500 mcg, 0.5 mg, Nebulization, BID **AND** Arformoterol Tartrate (BROVANA) nebulizer solution 15 mcg, 15 mcg, Nebulization, BID  venlafaxine (EFFEXOR XR) extended release capsule 75 mg, 75 mg, Oral, Daily  melatonin tablet 3 mg, 3 mg, Oral, Daily  ALPRAZolam (XANAX) tablet 0.5 mg, 0.5 mg, Oral, Daily PRN  apixaban (ELIQUIS) tablet 5 mg, 5 mg, Oral, BID  atorvastatin (LIPITOR) tablet 20 mg, 20 mg, Oral, Daily  busPIRone (BUSPAR) tablet 5 mg, 5 mg, Oral, TID  carbidopa-levodopa (SINEMET CR)  MG per extended release tablet 2 tablet, 2 tablet, Oral, TID  ipratropium-albuterol (DUONEB) nebulizer solution 3 mL, 3 mL, Inhalation, Q4H PRN  isosorbide mononitrate (IMDUR) extended release tablet 30 mg, 30 mg, Oral, Daily  lamoTRIgine (LAMICTAL) tablet 200 mg, 200 mg, Oral, Daily  montelukast (SINGULAIR) tablet 10 mg, 10 mg, Oral, Nightly  pantoprazole (PROTONIX) tablet 40 mg, 40 mg, Oral, QAM AC  rOPINIRole (REQUIP) tablet 1 mg, 1 mg, Oral, TID  sodium chloride flush 0.9 % injection 5-40 mL, 5-40 mL, IntraVENous, 2 times per day  sodium chloride flush 0.9 % injection 5-40 mL, 5-40 mL, IntraVENous, PRN  0.9 % sodium chloride infusion, , IntraVENous, PRN  ondansetron (ZOFRAN-ODT) disintegrating tablet 4 mg, 4 mg, Oral, Q8H PRN **OR** ondansetron (ZOFRAN) injection 4 mg, 4 mg, IntraVENous, Q6H PRN  polyethylene glycol (GLYCOLAX) packet 17 g, 17 g, Oral, Daily PRN  acetaminophen (TYLENOL) tablet 650 mg, 650 mg, Oral, Q6H PRN **OR** acetaminophen (TYLENOL) suppository 650 mg, 650 mg, Rectal, Q6H PRN  glucose chewable tablet 16 g, 4 tablet, Oral, PRN  dextrose bolus 10% 125 mL, 125 mL, IntraVENous, PRN **OR** dextrose bolus 10% 250 mL, 250 mL, IntraVENous, PRN  glucagon (rDNA) injection 1 mg, 1 mg, IntraMUSCular, PRN  dextrose 5 % solution, 100 mL/hr, IntraVENous, PRN  furosemide (LASIX) injection 40 mg, 40 mg, IntraVENous, BID  insulin glargine-yfgn (SEMGLEE-YFGN) injection vial 28 Units, 0.25 Units/kg, SubCUTAneous, Nightly  insulin lispro (HUMALOG) injection vial 0-6 Units, 0-6 Units, SubCUTAneous, TID WC  insulin lispro (HUMALOG) injection vial 0-3 Units, 0-3 Units, SubCUTAneous, Nightly    Allergies: Ciprofloxacin and Codeine    Family History:   Family History   Problem Relation Age of Onset    Cancer Mother         Lung Ca    Cancer Father         lung Ca    Diabetes Sister     Heart Disease Sister     Seizures Son     Other Brother         sepsis       Review of Systems:   · Constitutional: Denies fatigue, fevers, chills or night sweats  · Eyes: Denies visual changes or drainage  · ENT: Denies headaches or hearing loss. No mouth sores or sore throat. No epistaxis   · Cardiovascular: Denies chest pain, pressure or palpitations. No lower extremity swelling. · Respiratory: Denies MORGAN, cough, orthopnea or PND. No hemoptysis   · Gastrointestinal: Denies hematemesis or anorexia. No hematochezia or melena    · Genitourinary: Denies urgency, dysuria or hematuria. · Musculoskeletal: Denies gait disturbance, weakness or joint complaints  · Integumentary: Denies rash, hives or pruritis   · Neurological: Denies dizziness, headaches or seizures. No numbness or tingling  · Psychiatric: Denies anxiety or depression. · Endocrine: Denies temperature intolerance. No recent weight change. .  · Hematologic/Lymphatic: Denies abnormal bruising or bleeding. No swollen lymph nodes    Physical Exam:   BP 99/82   Pulse 89   Temp 97.7 °F (36.5 °C) (Oral)   Resp 14   Wt 244 lb (110.7 kg)   SpO2 100%   BMI 47.65 kg/m²   CONST:  Well developed, well nourished who appears of stated age. Awake, alert and cooperative. No apparent distress. HEENT:   Head- Normocephalic, atraumatic   Eyes- Conjunctivae pink, anicteric  Throat- Oral mucosa pink and moist  Neck-  No stridor, trachea midline, no jugular venous distention. No carotid bruit. CHEST: Chest symmetrical and non-tender to palpation.  No accessory muscle use or intercostal retractions  RESPIRATORY: Lung sounds - clear throughout fields   CARDIOVASCULAR:     Heart Inspection- shows no noted pulsations  Heart Palpation- no heaves or thrills; PMI is non-displaced   Heart Ausculation- Regular rate and rhythm, no murmur. No s3, s4 or rub   PV: No lower extremity edema. No varicosities. Pedal pulses palpable, no clubbing or cyanosis   ABDOMEN: Soft, non-tender to light palpation. Bowel sounds present. No palpable masses no organomegaly; no abdominal bruit  MS: Good muscle strength and tone. No atrophy or abnormal movements. : Deferred  SKIN: Warm and dry no statis dermatitis or ulcers   NEURO / PSYCH: Oriented to person, place and time. Speech clear and appropriate. Follows all commands.  Pleasant affect     Telemetry:  ECG:       Intake/Output Summary (Last 24 hours) at 5/13/2022 1346  Last data filed at 5/13/2022 0622  Gross per 24 hour   Intake 455 ml   Output 2000 ml   Net -1545 ml   urinary output last two shifts: 1175 mls, 1000 mls    Labs:   CBC:   Recent Labs     05/11/22 1715 05/12/22 0144   WBC 5.7 5.4   HGB 9.3* 9.6*   HCT 31.9* 33.4*    206     BMP:   Recent Labs     05/12/22  0144 05/13/22  0450    142   K 4.4 4.3   CO2 32* 35*   BUN 23 25*   CREATININE 1.4* 1.5*   LABGLOM 37 34   CALCIUM 9.0 8.5*     Mag:   Recent Labs     05/12/22  0144 05/13/22  0450   MG 2.3 2.2     HgA1c:   Lab Results   Component Value Date    LABA1C 6.5 (H) 05/12/2022     proBNP:   Recent Labs     05/11/22 1715   PROBNP 7,325*     FASTING LIPID PANEL:  Lab Results   Component Value Date    CHOL 95 05/12/2022    HDL 38 05/12/2022    LDLCALC 38 05/12/2022    TRIG 93 05/12/2022     LIVER PROFILE:  Recent Labs     05/11/22 1715   AST 8   ALT <5   LABALBU 4.2     BNP:  Component      Latest Ref Rng & Units 5/11/2022 4/15/2022 3/16/2022           5:15 PM  3:25 AM  7:48 PM   Pro-BNP      0 - 125 pg/mL 7,325 (H) 3,670 (H) 799 (H)     Component      Latest Ref Rng & Units 5/11/2022 4/15/2022 3/16/2022           5:15 PM  3:25 AM  7:48 PM   Pro-BNP      0 - 125 pg/mL 7,325 (H) 3,670 (H) 799 (H)       CXR 5/11/2012:  FINDINGS:   The lungs are without acute focal process.  There is no effusion or pneumothorax.  Cardiomegaly.  The osseous structures are without acute process. Impression:    No acute process. I personally and independently saw and examined patient and reviewed all done pertinent laboratory data, imaging studies, ECGs and rhythm strips. I have reviewed and agree with the APN history and physical exam as documented in the above note. Electronically signed by Onur Cordero MD on 5/25/2022 at 7:20 AM    We were asked to see the patient for AF with RVR.     IMPRESSION:  1. AF with RVR, recurrent. Known history of PAF s/p DCCV in 4/2022.   2. Acute HFpEF  3. Acute hypoxic respiratory failure   4. Acute on top of CKD   5. Moderate CAD on [facility] 1/2022, clinically stable   6. SERENA, not compliant with CPAP  7. Asthma  8. Continue tobacco abuse  9. Morbid obesity   10. HTN  11. HLD  12. T2DM  13. Chronic iron deficiency anemia   14. Hx of Gastric ulcers in 2013  15. Anxiety/Depression      PLAN:   1. Discontinue IV lasix, monitor BMP and consider starting (PO lasix 40 mg QD) in am pending the results of BMP  2. Start Amiodarone PO   3. Consider DCCV  4. Monitor BP: consider discontinuing Imdur   5. Rest of medications same   6. Rest as per primary and other consultants   7.  Will follow      Electronically signed by Onur Cordero MD on 5/13/2022 at 2:37 PM

## 2022-05-13 NOTE — CARE COORDINATION
Social Work Discharge Planning:  SW met with patient for initial assessment. Patient lives in a 3rd floor apartment with an elevator to gain access. Patient has a rollator, quad cane ERS and glucometer. Patient is not on O2 at home. Patient has a hx with [facility] and [facility], patient is not receptive to going to a SNF. Patient doesn't a have  Preference for the DME company if O2 is needed. Patient is unsure of her PCP name and pharamacy is [facility] in [place]. SW informed nurse that therapy evals are needed to assist with transition of care. SW will continue to follow.   Electronically signed by ESTHER Vyas on 5/12/2022 at 8:26 PM

## 2022-05-13 NOTE — CARE COORDINATION
Care Coordination:Call from liaison of [address], pt is long term care, no precert no covid, no therapy to return. Son is decision maker, but pt does not get along well with son.   Transport envelope completed and in soft chart    [place]

## 2022-05-13 NOTE — CONSULTS
Patient seen and examined. Chart, labs, imaging studies, EKG and rhythm strips reviewed. Full consult to follow. We were asked to see the patient for AF with RVR. IMPRESSION:  1. AF with RVR, recurrent. Known history of PAF s/p DCCV in 4/2022.   2. Acute HFpEF  3. Acute hypoxic respiratory failure   4. Acute on top of CKD   5. Moderate CAD on [facility] 1/2022, clinically stable   6. SERENA, not compliant with CPAP  7. Asthma  8. Continue tobacco abuse  9. Morbid obesity   10. HTN  11. HLD  12. T2DM  13. Chronic iron deficiency anemia   14. Hx of Gastric ulcers in 2013  15. Anxiety/Depression     PLAN:   1. Discontinue IV lasix, monitor BMP and consider starting (PO lasix 40 mg QD) in am pending the results of BMP  2. Start Amiodarone PO   3. Consider DCCV  4. Monitor BP: consider discontinuing Imdur   5. Rest of medications same   6. Rest as per primary and other consultants   7.  Will follow     Electronically signed by Alejandrina Rojas MD on 5/13/2022 at 2:37 PM

## 2022-05-14 LAB
ANION GAP SERPL CALCULATED.3IONS-SCNC: 8 MMOL/L (ref 7–16)
BUN BLDV-MCNC: 26 MG/DL (ref 6–23)
CALCIUM SERPL-MCNC: 8.5 MG/DL (ref 8.6–10.2)
CHLORIDE BLD-SCNC: 92 MMOL/L (ref 98–107)
CO2: 36 MMOL/L (ref 22–29)
CREAT SERPL-MCNC: 1.4 MG/DL (ref 0.5–1)
GFR AFRICAN AMERICAN: 45
GFR NON-AFRICAN AMERICAN: 37 ML/MIN/1.73
GLUCOSE BLD-MCNC: 68 MG/DL (ref 74–99)
MAGNESIUM: 2.1 MG/DL (ref 1.6–2.6)
METER GLUCOSE: 117 MG/DL (ref 74–99)
METER GLUCOSE: 77 MG/DL (ref 74–99)
METER GLUCOSE: 94 MG/DL (ref 74–99)
METER GLUCOSE: 97 MG/DL (ref 74–99)
POTASSIUM SERPL-SCNC: 4.7 MMOL/L (ref 3.5–5)
PRO-BNP: 4023 PG/ML (ref 0–125)
SODIUM BLD-SCNC: 136 MMOL/L (ref 132–146)

## 2022-05-14 PROCEDURE — 6370000000 HC RX 637 (ALT 250 FOR IP): Performed by: FAMILY MEDICINE

## 2022-05-14 PROCEDURE — 36415 COLL VENOUS BLD VENIPUNCTURE: CPT

## 2022-05-14 PROCEDURE — 94640 AIRWAY INHALATION TREATMENT: CPT

## 2022-05-14 PROCEDURE — 83880 ASSAY OF NATRIURETIC PEPTIDE: CPT

## 2022-05-14 PROCEDURE — 82962 GLUCOSE BLOOD TEST: CPT

## 2022-05-14 PROCEDURE — 6370000000 HC RX 637 (ALT 250 FOR IP): Performed by: HOSPITALIST

## 2022-05-14 PROCEDURE — 2580000003 HC RX 258: Performed by: FAMILY MEDICINE

## 2022-05-14 PROCEDURE — 6370000000 HC RX 637 (ALT 250 FOR IP): Performed by: INTERNAL MEDICINE

## 2022-05-14 PROCEDURE — 94664 DEMO&/EVAL PT USE INHALER: CPT

## 2022-05-14 PROCEDURE — 2140000000 HC CCU INTERMEDIATE R&B

## 2022-05-14 PROCEDURE — 2700000000 HC OXYGEN THERAPY PER DAY

## 2022-05-14 PROCEDURE — 6370000000 HC RX 637 (ALT 250 FOR IP): Performed by: NURSE PRACTITIONER

## 2022-05-14 PROCEDURE — 83735 ASSAY OF MAGNESIUM: CPT

## 2022-05-14 PROCEDURE — 6360000002 HC RX W HCPCS: Performed by: INTERNAL MEDICINE

## 2022-05-14 PROCEDURE — 6360000002 HC RX W HCPCS: Performed by: FAMILY MEDICINE

## 2022-05-14 PROCEDURE — 99233 SBSQ HOSP IP/OBS HIGH 50: CPT | Performed by: INTERNAL MEDICINE

## 2022-05-14 PROCEDURE — 80048 BASIC METABOLIC PNL TOTAL CA: CPT

## 2022-05-14 PROCEDURE — S5553 INSULIN LONG ACTING 5 U: HCPCS | Performed by: FAMILY MEDICINE

## 2022-05-14 RX ORDER — FUROSEMIDE 10 MG/ML
40 INJECTION INTRAMUSCULAR; INTRAVENOUS ONCE
Status: COMPLETED | OUTPATIENT
Start: 2022-05-14 | End: 2022-05-14

## 2022-05-14 RX ADMIN — ROPINIROLE HYDROCHLORIDE 1 MG: 1 TABLET, FILM COATED ORAL at 09:18

## 2022-05-14 RX ADMIN — CARBIDOPA AND LEVODOPA 2 TABLET: 25; 100 TABLET, EXTENDED RELEASE ORAL at 09:18

## 2022-05-14 RX ADMIN — BUDESONIDE 500 MCG: 0.5 SUSPENSION RESPIRATORY (INHALATION) at 20:51

## 2022-05-14 RX ADMIN — CARBIDOPA AND LEVODOPA 2 TABLET: 25; 100 TABLET, EXTENDED RELEASE ORAL at 14:22

## 2022-05-14 RX ADMIN — FUROSEMIDE 40 MG: 10 INJECTION, SOLUTION INTRAMUSCULAR; INTRAVENOUS at 18:21

## 2022-05-14 RX ADMIN — APIXABAN 5 MG: 5 TABLET, FILM COATED ORAL at 21:03

## 2022-05-14 RX ADMIN — LAMOTRIGINE 200 MG: 100 TABLET ORAL at 09:14

## 2022-05-14 RX ADMIN — VENLAFAXINE HYDROCHLORIDE 75 MG: 75 CAPSULE, EXTENDED RELEASE ORAL at 09:19

## 2022-05-14 RX ADMIN — AMIODARONE HYDROCHLORIDE 200 MG: 200 TABLET ORAL at 21:03

## 2022-05-14 RX ADMIN — MONTELUKAST 10 MG: 10 TABLET, FILM COATED ORAL at 21:03

## 2022-05-14 RX ADMIN — BUDESONIDE 500 MCG: 0.5 SUSPENSION RESPIRATORY (INHALATION) at 09:50

## 2022-05-14 RX ADMIN — PANTOPRAZOLE SODIUM 40 MG: 40 TABLET, DELAYED RELEASE ORAL at 05:52

## 2022-05-14 RX ADMIN — ARFORMOTEROL TARTRATE 15 MCG: 15 SOLUTION RESPIRATORY (INHALATION) at 20:51

## 2022-05-14 RX ADMIN — ATORVASTATIN CALCIUM 20 MG: 20 TABLET, FILM COATED ORAL at 09:14

## 2022-05-14 RX ADMIN — METOPROLOL SUCCINATE 50 MG: 50 TABLET, EXTENDED RELEASE ORAL at 09:14

## 2022-05-14 RX ADMIN — ROPINIROLE HYDROCHLORIDE 1 MG: 1 TABLET, FILM COATED ORAL at 14:21

## 2022-05-14 RX ADMIN — APIXABAN 5 MG: 5 TABLET, FILM COATED ORAL at 09:14

## 2022-05-14 RX ADMIN — Medication 3 MG: at 21:03

## 2022-05-14 RX ADMIN — ROPINIROLE HYDROCHLORIDE 1 MG: 1 TABLET, FILM COATED ORAL at 21:03

## 2022-05-14 RX ADMIN — Medication 10 ML: at 21:04

## 2022-05-14 RX ADMIN — Medication 10 ML: at 09:14

## 2022-05-14 RX ADMIN — AMIODARONE HYDROCHLORIDE 200 MG: 200 TABLET ORAL at 09:14

## 2022-05-14 RX ADMIN — METOPROLOL SUCCINATE 50 MG: 50 TABLET, EXTENDED RELEASE ORAL at 21:03

## 2022-05-14 RX ADMIN — ARFORMOTEROL TARTRATE 15 MCG: 15 SOLUTION RESPIRATORY (INHALATION) at 09:50

## 2022-05-14 RX ADMIN — ALPRAZOLAM 0.5 MG: 0.25 TABLET ORAL at 21:03

## 2022-05-14 RX ADMIN — CARBIDOPA AND LEVODOPA 2 TABLET: 25; 100 TABLET, EXTENDED RELEASE ORAL at 21:03

## 2022-05-14 RX ADMIN — INSULIN GLARGINE-YFGN 28 UNITS: 100 INJECTION, SOLUTION SUBCUTANEOUS at 21:33

## 2022-05-14 RX ADMIN — AMIODARONE HYDROCHLORIDE 200 MG: 200 TABLET ORAL at 14:22

## 2022-05-14 RX ADMIN — BUSPIRONE HYDROCHLORIDE 5 MG: 5 TABLET ORAL at 09:18

## 2022-05-14 RX ADMIN — BUSPIRONE HYDROCHLORIDE 5 MG: 5 TABLET ORAL at 14:22

## 2022-05-14 NOTE — PROGRESS NOTES
Hospitalist Progress Note      SYNOPSIS: Patient admitted on 5/11/2022 for Heart failure [facility])      SUBJECTIVE:    Presented to the emergency department with concerns about hallucinations. Family reports she has been hallucinating. In the emergency department patient is not hallucinating. Vital signs reveal the patient to be tachycardic with a rate in 130s. EKG showed atrial fibrillation with RVR. Patient has a history atrial fibrillation and is anticoagulated on Eliquis. The remainder of her vital signs are within normal limits and stable. Laboratory studies demonstrate CO2 30, BUN 24, creatinine 1.3, glucose 165, proBNP 7325, troponin of 21, hemoglobin 9.3. Patient was given a dose of Lasix and medicine was consulted for admission for treatment of fluid overload/heart failure exacerbation. 5/14 - Patient assessed at bedside and found to be sleeping in bed. Patient awoke and was alert and oriented, answering questions appropriately. Patient nasal cannula was found to not be in the patients nose, however the patient did not complain of any dyspnea. Nasal cannula corrected and placed in patients nose. Patient inconsistent with statement as to whether she did or did not have hallucinations. Patient stated that she did not want implanted defibrillator because she did not know much about it and that she knew her  had one when he passed away. Patient has no medical complaints at time of assessment. Patient seen and examined  Records reviewed. No events overnight  Resting in bed  No fever or chills  No cp/ct/orthopnea      DIET: ADULT DIET; Dysphagia - Minced and Moist; 3 carb choices (45 gm/meal);  Low Sodium (2 gm)  CODE: Full Code    Intake/Output Summary (Last 24 hours) at 5/14/2022 1429  Last data filed at 5/14/2022 0544  Gross per 24 hour   Intake 400 ml   Output 2000 ml   Net -1600 ml       OBJECTIVE:    /76   Pulse 95   Temp 98 °F (36.7 °C) (Oral)   Resp 20   Ht 5' (1.524 m)   Wt 244 lb (110.7 kg)   SpO2 99%   BMI 47.65 kg/m²     General appearance: No apparent distress, appears stated age and cooperative. HEENT:  Conjunctivae/corneas clear. Neck: Supple. No jugular venous distention. Respiratory: Clear to auscultation bilaterally, normal respiratory effort  Cardiovascular: Regular rate rhythm, normal S1-S2  Abdomen: Soft, nontender, nondistended  Musculoskeletal: No clubbing, cyanosis, no bilateral lower extremity edema. Brisk capillary refill. Skin:  No rashes  on visible skin  Neurologic: awake, alert and following commands     ASSESSMENT:    Principal Problem:    Heart failure ([address]  Resolved Problems:    * No resolved hospital problems. *       PLAN:     68 y. o. year old [de-identified]  has a past medical history of Acute on chronic congestive heart failure ([address] Anxiety, Asthma, CAD (coronary artery disease), Cancer ([address] Chronic kidney disease, Depression, Diabetes mellitus ([address] H/O mammogram, Hx MRSA infection, Hyperlipidemia, Hypertension, Lateral epicondylitis, SERENA on CPAP, Parkinson's disease ([address] and Tubal ligation status.         Presented to the emergency department with concerns about hallucinations.  Family reports she has been hallucinating.  In the emergency department patient is not hallucinating.  Vital signs reveal the patient to be tachycardic with a rate in 130s.   EKG showed atrial fibrillation with RVR.  Patient has a history atrial fibrillation and is anticoagulated on Eliquis.       -Acute on chronic congestive heart failure  Admitted with volume overload  Diurese  Consult cardiology     -Atrial fibrillation with rapid ventricular response  Admitted on cardizem drip for rate control   Sc cardizem drip   Make toprol xl 50 mg bid  eliqis  Cardio consulted     -Hallucinations - Anxiety/depression  Psych consulted for assist     -Hypertension  Stable     -Type 2 diabetes  accucheck with coverage     -Hyperlipidemia  Statin     -CKD stage III  Follow creat    DISPOSITION: pending    Medications:  REVIEWED DAILY    Infusion Medications    sodium chloride      dextrose       Scheduled Medications    metoprolol succinate  50 mg Oral BID    amiodarone  200 mg Oral TID    [START ON 5/21/2022] amiodarone  200 mg Oral Daily    budesonide  0.5 mg Nebulization BID    And    Arformoterol Tartrate  15 mcg Nebulization BID    venlafaxine  75 mg Oral Daily    melatonin  3 mg Oral Daily    apixaban  5 mg Oral BID    atorvastatin  20 mg Oral Daily    busPIRone  5 mg Oral TID    carbidopa-levodopa  2 tablet Oral TID    lamoTRIgine  200 mg Oral Daily    montelukast  10 mg Oral Nightly    pantoprazole  40 mg Oral QAM AC    rOPINIRole  1 mg Oral TID    sodium chloride flush  5-40 mL IntraVENous 2 times per day    insulin glargine  0.25 Units/kg SubCUTAneous Nightly    insulin lispro  0-6 Units SubCUTAneous TID WC    insulin lispro  0-3 Units SubCUTAneous Nightly     PRN Meds: albuterol, ALPRAZolam, ipratropium-albuterol, sodium chloride flush, sodium chloride, ondansetron **OR** ondansetron, polyethylene glycol, acetaminophen **OR** acetaminophen, glucose, dextrose bolus **OR** dextrose bolus, glucagon (rDNA), dextrose    Labs:     Recent Labs     05/11/22  1715 05/12/22 0144   WBC 5.7 5.4   HGB 9.3* 9.6*   HCT 31.9* 33.4*    206       Recent Labs     05/12/22  0144 05/13/22  0450 05/14/22  0622    142 136   K 4.4 4.3 4.7    96* 92*   CO2 32* 35* 36*   BUN 23 25* 26*   CREATININE 1.4* 1.5* 1.4*   CALCIUM 9.0 8.5* 8.5*       Recent Labs     05/11/22  1715   PROT 6.2*   ALKPHOS 124*   ALT <5   AST 8   BILITOT 0.6       No results for input(s): INR in the last 72 hours. No results for input(s): Barbara Beat in the last 72 hours.     Chronic labs:    Lab Results   Component Value Date    CHOL 95 05/12/2022    TRIG 93 05/12/2022    HDL 38 05/12/2022    LDLCALC 38 05/12/2022    TSH 1.030 04/14/2022    INR 1.3 04/15/2022 LABA1C 6.5 (H) 05/12/2022       Radiology: REVIEWED DAILY    +++++++++++++++++++++++++++++++++++++++++++++++++  Minnie Chaves, RASHARD - CNP  [facility] - [address], [place]  +++++++++++++++++++++++++++++++++++++++++++++++++  NOTE: This report was transcribed using voice recognition software. Every effort was made to ensure accuracy; however, inadvertent computerized transcription errors may be present.

## 2022-05-14 NOTE — PROGRESS NOTES
Patient is seen in follow-up for atrial fibrillation with RVR    Subjective:     Ms. Raquel Ferrera feels better, shortness of breath is slowly improving  Laying in bed mild respiratory distress    ROS:  CONSTITUTIONAL:  negative for  fevers, chills  HEENT:  negative for earaches, nasal congestion and epistaxis  RESPIRATORY:  negative for  dry cough, cough with sputum,wheezing and hemoptysis  GASTROINTESTINAL:  negative for nausea, vomiting  MUSCULOSKELETAL:  negative for  myalgias, arthralgias  NEUROLOGICAL:  negative for visual disturbance, dysphagia    Medication side effects: none    Scheduled Meds:   metoprolol succinate  50 mg Oral BID    amiodarone  200 mg Oral TID    [START ON 5/21/2022] amiodarone  200 mg Oral Daily    budesonide  0.5 mg Nebulization BID    And    Arformoterol Tartrate  15 mcg Nebulization BID    venlafaxine  75 mg Oral Daily    melatonin  3 mg Oral Daily    apixaban  5 mg Oral BID    atorvastatin  20 mg Oral Daily    busPIRone  5 mg Oral TID    carbidopa-levodopa  2 tablet Oral TID    lamoTRIgine  200 mg Oral Daily    montelukast  10 mg Oral Nightly    pantoprazole  40 mg Oral QAM AC    rOPINIRole  1 mg Oral TID    sodium chloride flush  5-40 mL IntraVENous 2 times per day    insulin glargine  0.25 Units/kg SubCUTAneous Nightly    insulin lispro  0-6 Units SubCUTAneous TID WC    insulin lispro  0-3 Units SubCUTAneous Nightly     Continuous Infusions:   sodium chloride      dextrose       PRN Meds:albuterol, ALPRAZolam, ipratropium-albuterol, sodium chloride flush, sodium chloride, ondansetron **OR** ondansetron, polyethylene glycol, acetaminophen **OR** acetaminophen, glucose, dextrose bolus **OR** dextrose bolus, glucagon (rDNA), dextrose    I/O last 3 completed shifts: In: 850 [P.O.:800; I.V.:50]  Out: 3000 [Urine:3000]  No intake/output data recorded.       Objective:      Physical Exam:   /69   Pulse 93   Temp 98.8 °F (37.1 °C) (Oral)   Resp 18   Ht 5' (1.524 m) Wt 244 lb (110.7 kg)   SpO2 97%   BMI 47.65 kg/m²   CONSTITUTIONAL:  awake, alert, cooperative, no apparent distress, and appears stated age  HEAD:  normocepalic, without obvious abnormality, atraumatic  NECK:  Supple, symmetrical, trachea midline, no adenopathy, thyroid symmetric, not enlarged and no tenderness, skin normal  LUNGS:  No increased work of breathing, No accessory muscle use or intercostal retractions, good air exchange, scattered rhonchi  CARDIOVASCULAR:  Normal apical impulse, irregularly irregular, normal S1 and S2, no S3 or S4, and no murmur noted, no edema, no JVD, no carotid bruit. ABDOMEN:  Soft, nontender, no masses, no hepatomegaly, no splenomegaly, BS+  MUSCULOSKELETAL:  No clubbing no cyanosis. there is no redness, warmth, or swelling of the joints  full range of motion noted  NEUROLOGIC:  Alert, awake,oriented x3  SKIN:  no bruising or bleeding, normal skin color, texture, turgor and no redness, warmth, or swelling      Cardiographics  I personally reviewed the telemetry monitor strips with the following interpretation: Atrial fibrillation with controlled ventricular response, with bursts of A. fib with RVR    Echocardiogram: 4/18/2022,Summary   Normal left ventricle size and systolic function. Ejection fraction is visually estimated at 60%. No regional wall motion abnormalities seen. Moderately dilated left atrium. No evidence of thrombus within left atrium or appendage. Agitated saline injected for shunt evaluation. No evidence of patent foramen ovale on bubble study. NAIF emptying velocity 18 cm/sec. Normal right ventricular size and function. Mild mitral regurgitation is present. No hemodynamically significant aortic stenosis is present. Mild tricuspid regurgitation. RVSP is 32 mmHg. Normal estimated PA systolic pressure. No evidence for hemodynamically significant pericardial effusion. Imaging  XR CHEST PORTABLE   Final Result   No acute process. Lab Review   Lab Results   Component Value Date     05/14/2022    K 4.7 05/14/2022    K 4.5 05/11/2022    CL 92 05/14/2022    CO2 36 05/14/2022    BUN 26 05/14/2022    CREATININE 1.4 05/14/2022    GLUCOSE 68 05/14/2022    CALCIUM 8.5 05/14/2022     Lab Results   Component Value Date    WBC 5.4 05/12/2022    HGB 9.6 05/12/2022    HCT 33.4 05/12/2022    MCV 90.8 05/12/2022     05/12/2022     I have personally reviewed the laboratory, cardiac diagnostic and radiographic testing as outlined above:    Assessment:     1. Atrial fibrillation with RVR: Rate is better controlled on current treatment, will continue  2. Acute on chronic diastolic congestive heart failure, improved with the diuretic therapy which was held due to worsening kidney function, will give 1 dose of Lasix today  3. CAD: Nonobstructive involving LAD and left circumflex artery  4. Mitral valve regurgitation: Mild  5. Tricuspid valve regurgitation: Mild  6. Hypertension: Controlled  7. Hyperlipidemia  8. Type 2 diabetes mellitus  9. Tobacco abuse: Patient was counseled regarding smoke cessation      Recommendations:     1. Lasix 40 mg IV once  2. Continue the rest of medications  3. Basic metabolic panel and CBC in a.m.  4.  Intake and output  5. Possible DC cardioversion on Monday if not pharmacologically converted    Discussed with patient  Discussed with nursing staff  Electronically signed by Rafita Yin MD on 5/14/2022 at 2:12 PM  NOTE: This report was transcribed using voice recognition software.  Every effort was made to ensure accuracy; however, inadvertent computerized transcription errors may be present

## 2022-05-14 NOTE — PLAN OF CARE
Problem: Chronic Conditions and Co-morbidities  Goal: Patient's chronic conditions and co-morbidity symptoms are monitored and maintained or improved  Outcome: Progressing     Problem: Discharge Planning  Goal: Discharge to home or other facility with appropriate resources  Outcome: Progressing     Problem: Safety - Adult  Goal: Free from fall injury  Outcome: Progressing     Problem: ABCDS Injury Assessment  Goal: Absence of physical injury  Outcome: Progressing     Problem: Skin/Tissue Integrity  Goal: Absence of new skin breakdown  Description: 1. Monitor for areas of redness and/or skin breakdown  2. Assess vascular access sites hourly  3. Every 4-6 hours minimum:  Change oxygen saturation probe site  4. Every 4-6 hours:  If on nasal continuous positive airway pressure, respiratory therapy assess nares and determine need for appliance change or resting period.   Outcome: Progressing     Problem: Cardiovascular - Adult  Goal: Maintains optimal cardiac output and hemodynamic stability  Outcome: Progressing     Problem: Metabolic/Fluid and Electrolytes - Adult  Goal: Hemodynamic stability and optimal renal function maintained  Outcome: Progressing

## 2022-05-15 LAB
ANION GAP SERPL CALCULATED.3IONS-SCNC: 10 MMOL/L (ref 7–16)
BUN BLDV-MCNC: 23 MG/DL (ref 6–23)
CALCIUM SERPL-MCNC: 8.9 MG/DL (ref 8.6–10.2)
CHLORIDE BLD-SCNC: 94 MMOL/L (ref 98–107)
CO2: 37 MMOL/L (ref 22–29)
CREAT SERPL-MCNC: 1.4 MG/DL (ref 0.5–1)
GFR AFRICAN AMERICAN: 45
GFR NON-AFRICAN AMERICAN: 37 ML/MIN/1.73
GLUCOSE BLD-MCNC: 105 MG/DL (ref 74–99)
HCT VFR BLD CALC: 37.6 % (ref 34–48)
HEMOGLOBIN: 11.1 G/DL (ref 11.5–15.5)
MAGNESIUM: 2.1 MG/DL (ref 1.6–2.6)
MCH RBC QN AUTO: 25.6 PG (ref 26–35)
MCHC RBC AUTO-ENTMCNC: 29.5 % (ref 32–34.5)
MCV RBC AUTO: 86.6 FL (ref 80–99.9)
METER GLUCOSE: 115 MG/DL (ref 74–99)
METER GLUCOSE: 118 MG/DL (ref 74–99)
METER GLUCOSE: 194 MG/DL (ref 74–99)
METER GLUCOSE: 279 MG/DL (ref 74–99)
PDW BLD-RTO: 16.8 FL (ref 11.5–15)
PLATELET # BLD: 239 E9/L (ref 130–450)
PMV BLD AUTO: 10.2 FL (ref 7–12)
POTASSIUM SERPL-SCNC: 4.3 MMOL/L (ref 3.5–5)
RBC # BLD: 4.34 E12/L (ref 3.5–5.5)
SODIUM BLD-SCNC: 141 MMOL/L (ref 132–146)
WBC # BLD: 8.7 E9/L (ref 4.5–11.5)

## 2022-05-15 PROCEDURE — 82962 GLUCOSE BLOOD TEST: CPT

## 2022-05-15 PROCEDURE — 6370000000 HC RX 637 (ALT 250 FOR IP): Performed by: FAMILY MEDICINE

## 2022-05-15 PROCEDURE — 6360000002 HC RX W HCPCS: Performed by: FAMILY MEDICINE

## 2022-05-15 PROCEDURE — 83735 ASSAY OF MAGNESIUM: CPT

## 2022-05-15 PROCEDURE — 6370000000 HC RX 637 (ALT 250 FOR IP): Performed by: HOSPITALIST

## 2022-05-15 PROCEDURE — 2140000000 HC CCU INTERMEDIATE R&B

## 2022-05-15 PROCEDURE — 36415 COLL VENOUS BLD VENIPUNCTURE: CPT

## 2022-05-15 PROCEDURE — 2580000003 HC RX 258: Performed by: INTERNAL MEDICINE

## 2022-05-15 PROCEDURE — 6370000000 HC RX 637 (ALT 250 FOR IP): Performed by: NURSE PRACTITIONER

## 2022-05-15 PROCEDURE — 6370000000 HC RX 637 (ALT 250 FOR IP): Performed by: INTERNAL MEDICINE

## 2022-05-15 PROCEDURE — 85027 COMPLETE CBC AUTOMATED: CPT

## 2022-05-15 PROCEDURE — 2580000003 HC RX 258: Performed by: FAMILY MEDICINE

## 2022-05-15 PROCEDURE — 2700000000 HC OXYGEN THERAPY PER DAY

## 2022-05-15 PROCEDURE — 94640 AIRWAY INHALATION TREATMENT: CPT

## 2022-05-15 PROCEDURE — 80048 BASIC METABOLIC PNL TOTAL CA: CPT

## 2022-05-15 PROCEDURE — S5553 INSULIN LONG ACTING 5 U: HCPCS | Performed by: FAMILY MEDICINE

## 2022-05-15 RX ORDER — SODIUM CHLORIDE 9 MG/ML
INJECTION, SOLUTION INTRAVENOUS PRN
Status: DISCONTINUED | OUTPATIENT
Start: 2022-05-15 | End: 2022-05-17 | Stop reason: HOSPADM

## 2022-05-15 RX ORDER — SODIUM CHLORIDE 0.9 % (FLUSH) 0.9 %
5-40 SYRINGE (ML) INJECTION EVERY 12 HOURS SCHEDULED
Status: DISCONTINUED | OUTPATIENT
Start: 2022-05-15 | End: 2022-05-17 | Stop reason: HOSPADM

## 2022-05-15 RX ORDER — SODIUM CHLORIDE 0.9 % (FLUSH) 0.9 %
5-40 SYRINGE (ML) INJECTION PRN
Status: DISCONTINUED | OUTPATIENT
Start: 2022-05-15 | End: 2022-05-17 | Stop reason: HOSPADM

## 2022-05-15 RX ADMIN — METOPROLOL SUCCINATE 50 MG: 50 TABLET, EXTENDED RELEASE ORAL at 08:51

## 2022-05-15 RX ADMIN — AMIODARONE HYDROCHLORIDE 200 MG: 200 TABLET ORAL at 08:51

## 2022-05-15 RX ADMIN — APIXABAN 5 MG: 5 TABLET, FILM COATED ORAL at 08:51

## 2022-05-15 RX ADMIN — APIXABAN 5 MG: 5 TABLET, FILM COATED ORAL at 21:08

## 2022-05-15 RX ADMIN — AMIODARONE HYDROCHLORIDE 200 MG: 200 TABLET ORAL at 14:34

## 2022-05-15 RX ADMIN — BUSPIRONE HYDROCHLORIDE 5 MG: 5 TABLET ORAL at 08:51

## 2022-05-15 RX ADMIN — INSULIN GLARGINE-YFGN 28 UNITS: 100 INJECTION, SOLUTION SUBCUTANEOUS at 21:40

## 2022-05-15 RX ADMIN — ROPINIROLE HYDROCHLORIDE 1 MG: 1 TABLET, FILM COATED ORAL at 08:51

## 2022-05-15 RX ADMIN — Medication 3 MG: at 21:08

## 2022-05-15 RX ADMIN — INSULIN LISPRO 3 UNITS: 100 INJECTION, SOLUTION INTRAVENOUS; SUBCUTANEOUS at 12:00

## 2022-05-15 RX ADMIN — METOPROLOL SUCCINATE 50 MG: 50 TABLET, EXTENDED RELEASE ORAL at 21:08

## 2022-05-15 RX ADMIN — ALPRAZOLAM 0.5 MG: 0.25 TABLET ORAL at 21:08

## 2022-05-15 RX ADMIN — AMIODARONE HYDROCHLORIDE 200 MG: 200 TABLET ORAL at 21:08

## 2022-05-15 RX ADMIN — BUSPIRONE HYDROCHLORIDE 5 MG: 5 TABLET ORAL at 14:34

## 2022-05-15 RX ADMIN — CARBIDOPA AND LEVODOPA 2 TABLET: 25; 100 TABLET, EXTENDED RELEASE ORAL at 08:51

## 2022-05-15 RX ADMIN — PANTOPRAZOLE SODIUM 40 MG: 40 TABLET, DELAYED RELEASE ORAL at 05:55

## 2022-05-15 RX ADMIN — ROPINIROLE HYDROCHLORIDE 1 MG: 1 TABLET, FILM COATED ORAL at 21:08

## 2022-05-15 RX ADMIN — CARBIDOPA AND LEVODOPA 2 TABLET: 25; 100 TABLET, EXTENDED RELEASE ORAL at 14:34

## 2022-05-15 RX ADMIN — MONTELUKAST 10 MG: 10 TABLET, FILM COATED ORAL at 21:08

## 2022-05-15 RX ADMIN — ARFORMOTEROL TARTRATE 15 MCG: 15 SOLUTION RESPIRATORY (INHALATION) at 19:46

## 2022-05-15 RX ADMIN — ROPINIROLE HYDROCHLORIDE 1 MG: 1 TABLET, FILM COATED ORAL at 14:33

## 2022-05-15 RX ADMIN — CARBIDOPA AND LEVODOPA 2 TABLET: 25; 100 TABLET, EXTENDED RELEASE ORAL at 21:07

## 2022-05-15 RX ADMIN — Medication 10 ML: at 08:52

## 2022-05-15 RX ADMIN — LAMOTRIGINE 200 MG: 100 TABLET ORAL at 08:51

## 2022-05-15 RX ADMIN — Medication 10 ML: at 21:09

## 2022-05-15 RX ADMIN — INSULIN LISPRO 1 UNITS: 100 INJECTION, SOLUTION INTRAVENOUS; SUBCUTANEOUS at 21:40

## 2022-05-15 RX ADMIN — VENLAFAXINE HYDROCHLORIDE 75 MG: 75 CAPSULE, EXTENDED RELEASE ORAL at 08:51

## 2022-05-15 RX ADMIN — BUSPIRONE HYDROCHLORIDE 5 MG: 5 TABLET ORAL at 21:09

## 2022-05-15 RX ADMIN — ATORVASTATIN CALCIUM 20 MG: 20 TABLET, FILM COATED ORAL at 08:51

## 2022-05-15 RX ADMIN — BUDESONIDE 500 MCG: 0.5 SUSPENSION RESPIRATORY (INHALATION) at 19:46

## 2022-05-15 ASSESSMENT — PAIN SCALES - GENERAL: PAINLEVEL_OUTOF10: 0

## 2022-05-15 NOTE — PROGRESS NOTES
Patient is seen in follow-up for atrial fibrillation with RVR    Subjective:     Ms. Miranda Francois feels better, shortness of breath is slowly improving  Laying in bed mild respiratory distress    ROS:  CONSTITUTIONAL:  negative for  fevers, chills  HEENT:  negative for earaches, nasal congestion and epistaxis  RESPIRATORY:  negative for  dry cough, cough with sputum,wheezing and hemoptysis  GASTROINTESTINAL:  negative for nausea, vomiting  MUSCULOSKELETAL:  negative for  myalgias, arthralgias  NEUROLOGICAL:  negative for visual disturbance, dysphagia    Medication side effects: none    Scheduled Meds:   metoprolol succinate  50 mg Oral BID    amiodarone  200 mg Oral TID    [START ON 5/21/2022] amiodarone  200 mg Oral Daily    budesonide  0.5 mg Nebulization BID    And    Arformoterol Tartrate  15 mcg Nebulization BID    venlafaxine  75 mg Oral Daily    melatonin  3 mg Oral Daily    apixaban  5 mg Oral BID    atorvastatin  20 mg Oral Daily    busPIRone  5 mg Oral TID    carbidopa-levodopa  2 tablet Oral TID    lamoTRIgine  200 mg Oral Daily    montelukast  10 mg Oral Nightly    pantoprazole  40 mg Oral QAM AC    rOPINIRole  1 mg Oral TID    sodium chloride flush  5-40 mL IntraVENous 2 times per day    insulin glargine  0.25 Units/kg SubCUTAneous Nightly    insulin lispro  0-6 Units SubCUTAneous TID WC    insulin lispro  0-3 Units SubCUTAneous Nightly     Continuous Infusions:   sodium chloride      dextrose       PRN Meds:albuterol, ALPRAZolam, ipratropium-albuterol, sodium chloride flush, sodium chloride, ondansetron **OR** ondansetron, polyethylene glycol, acetaminophen **OR** acetaminophen, glucose, dextrose bolus **OR** dextrose bolus, glucagon (rDNA), dextrose    I/O last 3 completed shifts: In: 800 [P.O.:800]  Out: 3800 [Urine:3800]  No intake/output data recorded.       Objective:      Physical Exam:   /80   Pulse 78   Temp 98.8 °F (37.1 °C) (Oral)   Resp 18   Ht 5' (1.524 m)   Wt 244 lb (110.7 kg)   SpO2 96%   BMI 47.65 kg/m²   CONSTITUTIONAL:  awake, alert, cooperative, no apparent distress, and appears stated age  HEAD:  normocepalic, without obvious abnormality, atraumatic  NECK:  Supple, symmetrical, trachea midline, no adenopathy, thyroid symmetric, not enlarged and no tenderness, skin normal  LUNGS:  No increased work of breathing, No accessory muscle use or intercostal retractions, good air exchange, scattered rhonchi  CARDIOVASCULAR:  Normal apical impulse, irregularly irregular, normal S1 and S2, no S3 or S4, and no murmur noted, no edema, no JVD, no carotid bruit. ABDOMEN:  Soft, nontender, no masses, no hepatomegaly, no splenomegaly, BS+  MUSCULOSKELETAL:  No clubbing no cyanosis. there is no redness, warmth, or swelling of the joints  full range of motion noted  NEUROLOGIC:  Alert, awake,oriented x3  SKIN:  no bruising or bleeding, normal skin color, texture, turgor and no redness, warmth, or swelling      Cardiographics  I personally reviewed the telemetry monitor strips with the following interpretation: Atrial fibrillation with controlled ventricular response, with bursts of A. fib with RVR    Echocardiogram: 4/18/2022,Summary   Normal left ventricle size and systolic function. Ejection fraction is visually estimated at 60%. No regional wall motion abnormalities seen. Moderately dilated left atrium. No evidence of thrombus within left atrium or appendage. Agitated saline injected for shunt evaluation. No evidence of patent foramen ovale on bubble study. NAIF emptying velocity 18 cm/sec. Normal right ventricular size and function. Mild mitral regurgitation is present. No hemodynamically significant aortic stenosis is present. Mild tricuspid regurgitation. RVSP is 32 mmHg. Normal estimated PA systolic pressure. No evidence for hemodynamically significant pericardial effusion. Imaging  XR CHEST PORTABLE   Final Result   No acute process. Lab Review   Lab Results   Component Value Date     05/15/2022    K 4.3 05/15/2022    K 4.5 05/11/2022    CL 94 05/15/2022    CO2 37 05/15/2022    BUN 23 05/15/2022    CREATININE 1.4 05/15/2022    GLUCOSE 105 05/15/2022    CALCIUM 8.9 05/15/2022     Lab Results   Component Value Date    WBC 5.4 05/12/2022    HGB 9.6 05/12/2022    HCT 33.4 05/12/2022    MCV 90.8 05/12/2022     05/12/2022     I have personally reviewed the laboratory, cardiac diagnostic and radiographic testing as outlined above:    Assessment:     1. Atrial fibrillation with RVR: Rate is better controlled on current treatment, will continue  2. Acute on chronic diastolic congestive heart failure, improved with the diuretic therapy which was held due to worsening kidney function, will give 1 dose of Lasix today  3. CAD: Nonobstructive involving LAD and left circumflex artery  4. Mitral valve regurgitation: Mild  5. Tricuspid valve regurgitation: Mild  6. Hypertension: Controlled  7. Hyperlipidemia  8. Type 2 diabetes mellitus  9. Tobacco abuse: Patient was counseled regarding smoke cessation      Recommendations:     1. Lasix 40 mg IV once  2. Continue the rest of medications  3.  DC cardioversion tomorrow  4. Basic metabolic panel and CBC in a.m.  5.  Intake and output    Discussed with patient  Discussed with nursing staff  Electronically signed by Kings Amin MD on 5/15/2022 at 3:08 PM  NOTE: This report was transcribed using voice recognition software.  Every effort was made to ensure accuracy; however, inadvertent computerized transcription errors may be present

## 2022-05-15 NOTE — PROGRESS NOTES
Hospitalist Progress Note      SYNOPSIS: Patient admitted on 5/11/2022 for Heart failure [facility])      SUBJECTIVE:    Presented to the emergency department with concerns about hallucinations. Family reports she has been hallucinating. In the emergency department patient is not hallucinating. Vital signs reveal the patient to be tachycardic with a rate in 130s. EKG showed atrial fibrillation with RVR. Patient has a history atrial fibrillation and is anticoagulated on Eliquis. The remainder of her vital signs are within normal limits and stable. Laboratory studies demonstrate CO2 30, BUN 24, creatinine 1.3, glucose 165, proBNP 7325, troponin of 21, hemoglobin 9.3. Patient was given a dose of Lasix and medicine was consulted for admission for treatment of fluid overload/heart failure exacerbation. 5/14 - Patient assessed at bedside and found to be sleeping in bed. Patient awoke and was alert and oriented, answering questions appropriately. Patient nasal cannula was found to not be in the patients nose, however the patient did not complain of any dyspnea. Nasal cannula corrected and placed in patients nose. Patient inconsistent with statement as to whether she did or did not have hallucinations. Patient stated that she did not want implanted defibrillator because she did not know much about it and that she knew her  had one when he passed away. Patient has no medical complaints at time of assessment. 5/15 - Patient assessed at bedside, alert and oriented, answering questions appropriately. Patient states that nursing staff took her N/C off of her earlier and she was unable to maintain saturation on room air. Patient also states that she attempted to ambulate yesterday and was only able to make it to the end of the bed without having to sit down. Patient denies any use of home oxygen and states that she gets around with a rolling walker.   Possible cardioversion tomorrow per cardiology note.      Patient seen and examined  Records reviewed. No events overnight  Resting in bed  No fever or chills  No cp/ct/orthopnea      DIET: ADULT DIET; Dysphagia - Minced and Moist; 3 carb choices (45 gm/meal); Low Sodium (2 gm)  CODE: Full Code    Intake/Output Summary (Last 24 hours) at 5/15/2022 0736  Last data filed at 5/15/2022 0543  Gross per 24 hour   Intake 400 ml   Output 2700 ml   Net -2300 ml       OBJECTIVE:    /69   Pulse 97   Temp 98.5 °F (36.9 °C) (Oral)   Resp 17   Ht 5' (1.524 m)   Wt 244 lb (110.7 kg)   SpO2 96%   BMI 47.65 kg/m²     General appearance: No apparent distress, appears stated age and cooperative. HEENT:  Conjunctivae/corneas clear. Neck: Supple. No jugular venous distention. Respiratory: Clear to auscultation bilaterally, normal respiratory effort  Cardiovascular: Regular rate rhythm, normal S1-S2  Abdomen: Soft, nontender, nondistended  Musculoskeletal: No clubbing, cyanosis, no bilateral lower extremity edema. Brisk capillary refill. Skin:  No rashes  on visible skin  Neurologic: awake, alert and following commands     ASSESSMENT:    Principal Problem:    Heart failure (HCC)  Active Problems:    Acute diastolic (congestive) heart failure (HCC)    Nonrheumatic tricuspid valve regurgitation    Tobacco abuse  Resolved Problems:    * No resolved hospital problems. *       PLAN:     68 y. o. year old [de-identified]  has a past medical history of Acute on chronic congestive heart failure ([address] Anxiety, Asthma, CAD (coronary artery disease), Cancer ([address] Chronic kidney disease, Depression, Diabetes mellitus ([address] H/O mammogram, Hx MRSA infection, Hyperlipidemia, Hypertension, Lateral epicondylitis, SERENA on CPAP, Parkinson's disease ([address] and Tubal ligation status.         Presented to the emergency department with concerns about hallucinations.  Family reports she has been hallucinating.  In the emergency department patient is not hallucinating.  Vital signs 05/13/22  0450 05/14/22  0622 05/15/22  0621    136 141   K 4.3 4.7 4.3   CL 96* 92* 94*   CO2 35* 36* 37*   BUN 25* 26* 23   CREATININE 1.5* 1.4* 1.4*   CALCIUM 8.5* 8.5* 8.9       No results for input(s): PROT, ALB, ALKPHOS, ALT, AST, BILITOT, AMYLASE, LIPASE in the last 72 hours. No results for input(s): INR in the last 72 hours. No results for input(s): [place] in the last 72 hours. Chronic labs:    Lab Results   Component Value Date    CHOL 95 05/12/2022    TRIG 93 05/12/2022    HDL 38 05/12/2022    LDLCALC 38 05/12/2022    TSH 1.030 04/14/2022    INR 1.3 04/15/2022    LABA1C 6.5 (H) 05/12/2022       Radiology: REVIEWED DAILY    +++++++++++++++++++++++++++++++++++++++++++++++++  Ronyye Case, APRN - CNP  [facility] - [address] [place], [place]  +++++++++++++++++++++++++++++++++++++++++++++++++  NOTE: This report was transcribed using voice recognition software. Every effort was made to ensure accuracy; however, inadvertent computerized transcription errors may be present.

## 2022-05-16 ENCOUNTER — ANESTHESIA EVENT (OUTPATIENT)
Dept: CARDIAC CATH/INVASIVE PROCEDURES | Age: 72
DRG: 291 | End: 2022-05-16
Payer: MEDICARE

## 2022-05-16 ENCOUNTER — ANESTHESIA (OUTPATIENT)
Dept: CARDIAC CATH/INVASIVE PROCEDURES | Age: 72
DRG: 291 | End: 2022-05-16
Payer: MEDICARE

## 2022-05-16 ENCOUNTER — APPOINTMENT (OUTPATIENT)
Dept: CARDIAC CATH/INVASIVE PROCEDURES | Age: 72
DRG: 291 | End: 2022-05-16
Payer: MEDICARE

## 2022-05-16 LAB
ANION GAP SERPL CALCULATED.3IONS-SCNC: 7 MMOL/L (ref 7–16)
BUN BLDV-MCNC: 22 MG/DL (ref 6–23)
CALCIUM SERPL-MCNC: 9 MG/DL (ref 8.6–10.2)
CHLORIDE BLD-SCNC: 93 MMOL/L (ref 98–107)
CO2: 38 MMOL/L (ref 22–29)
CREAT SERPL-MCNC: 1.1 MG/DL (ref 0.5–1)
GFR AFRICAN AMERICAN: 59
GFR NON-AFRICAN AMERICAN: 49 ML/MIN/1.73
GLUCOSE BLD-MCNC: 106 MG/DL (ref 74–99)
MAGNESIUM: 2 MG/DL (ref 1.6–2.6)
METER GLUCOSE: 130 MG/DL (ref 74–99)
METER GLUCOSE: 153 MG/DL (ref 74–99)
METER GLUCOSE: 90 MG/DL (ref 74–99)
METER GLUCOSE: 98 MG/DL (ref 74–99)
POTASSIUM SERPL-SCNC: 4.4 MMOL/L (ref 3.5–5)
SODIUM BLD-SCNC: 138 MMOL/L (ref 132–146)

## 2022-05-16 PROCEDURE — 3700000001 HC ADD 15 MINUTES (ANESTHESIA)

## 2022-05-16 PROCEDURE — 6360000002 HC RX W HCPCS: Performed by: FAMILY MEDICINE

## 2022-05-16 PROCEDURE — 82962 GLUCOSE BLOOD TEST: CPT

## 2022-05-16 PROCEDURE — 94640 AIRWAY INHALATION TREATMENT: CPT

## 2022-05-16 PROCEDURE — 2580000003 HC RX 258: Performed by: INTERNAL MEDICINE

## 2022-05-16 PROCEDURE — 6370000000 HC RX 637 (ALT 250 FOR IP): Performed by: NURSE PRACTITIONER

## 2022-05-16 PROCEDURE — 3700000000 HC ANESTHESIA ATTENDED CARE

## 2022-05-16 PROCEDURE — 36415 COLL VENOUS BLD VENIPUNCTURE: CPT

## 2022-05-16 PROCEDURE — 92960 CARDIOVERSION ELECTRIC EXT: CPT

## 2022-05-16 PROCEDURE — 99233 SBSQ HOSP IP/OBS HIGH 50: CPT | Performed by: INTERNAL MEDICINE

## 2022-05-16 PROCEDURE — 6370000000 HC RX 637 (ALT 250 FOR IP): Performed by: FAMILY MEDICINE

## 2022-05-16 PROCEDURE — 2500000003 HC RX 250 WO HCPCS: Performed by: ANESTHESIOLOGIST ASSISTANT

## 2022-05-16 PROCEDURE — 2700000000 HC OXYGEN THERAPY PER DAY

## 2022-05-16 PROCEDURE — 93005 ELECTROCARDIOGRAM TRACING: CPT | Performed by: INTERNAL MEDICINE

## 2022-05-16 PROCEDURE — S5553 INSULIN LONG ACTING 5 U: HCPCS | Performed by: FAMILY MEDICINE

## 2022-05-16 PROCEDURE — 6370000000 HC RX 637 (ALT 250 FOR IP): Performed by: PHYSICIAN ASSISTANT

## 2022-05-16 PROCEDURE — 2709999900 HC NON-CHARGEABLE SUPPLY

## 2022-05-16 PROCEDURE — 6370000000 HC RX 637 (ALT 250 FOR IP): Performed by: INTERNAL MEDICINE

## 2022-05-16 PROCEDURE — 2140000000 HC CCU INTERMEDIATE R&B

## 2022-05-16 PROCEDURE — 2580000003 HC RX 258: Performed by: ANESTHESIOLOGIST ASSISTANT

## 2022-05-16 PROCEDURE — 80048 BASIC METABOLIC PNL TOTAL CA: CPT

## 2022-05-16 PROCEDURE — 92960 CARDIOVERSION ELECTRIC EXT: CPT | Performed by: INTERNAL MEDICINE

## 2022-05-16 PROCEDURE — 2580000003 HC RX 258: Performed by: FAMILY MEDICINE

## 2022-05-16 PROCEDURE — 83735 ASSAY OF MAGNESIUM: CPT

## 2022-05-16 PROCEDURE — APPSS30 APP SPLIT SHARED TIME 16-30 MINUTES: Performed by: PHYSICIAN ASSISTANT

## 2022-05-16 PROCEDURE — 5A2204Z RESTORATION OF CARDIAC RHYTHM, SINGLE: ICD-10-PCS | Performed by: INTERNAL MEDICINE

## 2022-05-16 RX ORDER — SODIUM CHLORIDE 9 MG/ML
INJECTION, SOLUTION INTRAVENOUS CONTINUOUS PRN
Status: DISCONTINUED | OUTPATIENT
Start: 2022-05-16 | End: 2022-05-16 | Stop reason: SDUPTHER

## 2022-05-16 RX ORDER — ASPIRIN 81 MG/1
81 TABLET, CHEWABLE ORAL DAILY
Status: DISCONTINUED | OUTPATIENT
Start: 2022-05-16 | End: 2022-05-17 | Stop reason: HOSPADM

## 2022-05-16 RX ORDER — ETOMIDATE 2 MG/ML
INJECTION INTRAVENOUS PRN
Status: DISCONTINUED | OUTPATIENT
Start: 2022-05-16 | End: 2022-05-16 | Stop reason: SDUPTHER

## 2022-05-16 RX ADMIN — ROPINIROLE HYDROCHLORIDE 1 MG: 1 TABLET, FILM COATED ORAL at 13:30

## 2022-05-16 RX ADMIN — ALPRAZOLAM 0.5 MG: 0.25 TABLET ORAL at 21:40

## 2022-05-16 RX ADMIN — ATORVASTATIN CALCIUM 20 MG: 20 TABLET, FILM COATED ORAL at 12:21

## 2022-05-16 RX ADMIN — ETOMIDATE 4 MG: 2 INJECTION, SOLUTION INTRAVENOUS at 09:33

## 2022-05-16 RX ADMIN — ARFORMOTEROL TARTRATE 15 MCG: 15 SOLUTION RESPIRATORY (INHALATION) at 11:27

## 2022-05-16 RX ADMIN — Medication 5 ML: at 21:41

## 2022-05-16 RX ADMIN — ETOMIDATE 2 MG: 2 INJECTION, SOLUTION INTRAVENOUS at 09:35

## 2022-05-16 RX ADMIN — CARBIDOPA AND LEVODOPA 2 TABLET: 25; 100 TABLET, EXTENDED RELEASE ORAL at 21:40

## 2022-05-16 RX ADMIN — BUDESONIDE 500 MCG: 0.5 SUSPENSION RESPIRATORY (INHALATION) at 19:57

## 2022-05-16 RX ADMIN — ARFORMOTEROL TARTRATE 15 MCG: 15 SOLUTION RESPIRATORY (INHALATION) at 19:57

## 2022-05-16 RX ADMIN — ASPIRIN 81 MG 81 MG: 81 TABLET ORAL at 18:04

## 2022-05-16 RX ADMIN — BUSPIRONE HYDROCHLORIDE 5 MG: 5 TABLET ORAL at 21:41

## 2022-05-16 RX ADMIN — Medication 5 ML: at 21:51

## 2022-05-16 RX ADMIN — APIXABAN 5 MG: 5 TABLET, FILM COATED ORAL at 21:40

## 2022-05-16 RX ADMIN — INSULIN LISPRO 1 UNITS: 100 INJECTION, SOLUTION INTRAVENOUS; SUBCUTANEOUS at 21:48

## 2022-05-16 RX ADMIN — SODIUM CHLORIDE: 9 INJECTION, SOLUTION INTRAVENOUS at 09:17

## 2022-05-16 RX ADMIN — ROPINIROLE HYDROCHLORIDE 1 MG: 1 TABLET, FILM COATED ORAL at 21:41

## 2022-05-16 RX ADMIN — CARBIDOPA AND LEVODOPA 2 TABLET: 25; 100 TABLET, EXTENDED RELEASE ORAL at 14:30

## 2022-05-16 RX ADMIN — PANTOPRAZOLE SODIUM 40 MG: 40 TABLET, DELAYED RELEASE ORAL at 06:04

## 2022-05-16 RX ADMIN — CARBIDOPA AND LEVODOPA 2 TABLET: 25; 100 TABLET, EXTENDED RELEASE ORAL at 12:22

## 2022-05-16 RX ADMIN — ROPINIROLE HYDROCHLORIDE 1 MG: 1 TABLET, FILM COATED ORAL at 12:21

## 2022-05-16 RX ADMIN — LAMOTRIGINE 200 MG: 100 TABLET ORAL at 12:22

## 2022-05-16 RX ADMIN — VENLAFAXINE HYDROCHLORIDE 75 MG: 75 CAPSULE, EXTENDED RELEASE ORAL at 12:21

## 2022-05-16 RX ADMIN — APIXABAN 5 MG: 5 TABLET, FILM COATED ORAL at 12:21

## 2022-05-16 RX ADMIN — MONTELUKAST 10 MG: 10 TABLET, FILM COATED ORAL at 21:40

## 2022-05-16 RX ADMIN — BUSPIRONE HYDROCHLORIDE 5 MG: 5 TABLET ORAL at 12:22

## 2022-05-16 RX ADMIN — BUSPIRONE HYDROCHLORIDE 5 MG: 5 TABLET ORAL at 14:33

## 2022-05-16 RX ADMIN — Medication 3 MG: at 21:40

## 2022-05-16 RX ADMIN — INSULIN GLARGINE-YFGN 28 UNITS: 100 INJECTION, SOLUTION SUBCUTANEOUS at 21:46

## 2022-05-16 RX ADMIN — AMIODARONE HYDROCHLORIDE 200 MG: 200 TABLET ORAL at 12:21

## 2022-05-16 RX ADMIN — BUDESONIDE 500 MCG: 0.5 SUSPENSION RESPIRATORY (INHALATION) at 11:27

## 2022-05-16 ASSESSMENT — LIFESTYLE VARIABLES: SMOKING_STATUS: 0

## 2022-05-16 ASSESSMENT — ENCOUNTER SYMPTOMS: SHORTNESS OF BREATH: 1

## 2022-05-16 ASSESSMENT — PAIN SCALES - GENERAL
PAINLEVEL_OUTOF10: 0
PAINLEVEL_OUTOF10: 0

## 2022-05-16 ASSESSMENT — PAIN SCALES - WONG BAKER: WONGBAKER_NUMERICALRESPONSE: 0

## 2022-05-16 NOTE — PROGRESS NOTES
Inpatient Cardiology Progress note     PATIENT IS BEING FOLLOWED FOR: NSTEMI    Charli Newby is a 66 y.o. female who follows with Dr Stanley Mtz. SUBJECTIVE: Denies CP, SOB, palpations, dizziness, or HA. OBJECTIVE: SB 47 on tele. Pt euvolemic and in no distress. ROS:  Consist: Denies fevers, chills or night sweats  Heart: Denies chest pain, palpitations, lightheadedness, dizziness or syncope  Lungs: Denies SOB, cough, wheezing, orthopnea or PND  GI: Denies abdominal pain, vomiting or diarrhea    PHYSICAL EXAM:   /84   Pulse (!) 43   Temp 98.1 °F (36.7 °C) (Oral)   Resp 20   Ht 5' (1.524 m)   Wt 229 lb 6.4 oz (104.1 kg)   SpO2 100%   BMI 44.80 kg/m²      CONST:  Well developed, 66 female who appears stated age. Awake, alert, cooperative, no apparent distress  HEENT:   Head- Normocephalic, atraumatic   Eyes- Conjunctivae pink, anicteric  Throat- Oral mucosa pink and moist  Neck-  No stridor, trachea midline, no jugular venous distention. CHEST: Chest symmetrical and non-tender to palpation. RESPIRATORY: Lung sounds clear throughout fields bilaterally. No wheeze or rhonchi noted. CARDIOVASCULAR:     No carotid bruit noted bilaterally   Heart Ausculation- Regular rate and rhythm, no murmur. No s3, s4 or rub   PV: No lower extremity edema. No varicosities. ABDOMEN: Soft, non-tender to light palpation. Bowel sounds present. MS: Good muscle strength and tone. No atrophy or abnormal movements. : Deferred  SKIN: Warm and dry no statis dermatitis or ulcers   NEURO / PSYCH: Oriented to person, place and time. Speech clear and appropriate. Follows all commands.  Pleasant affect       Intake/Output Summary (Last 24 hours) at 5/16/2022 1233  Last data filed at 5/16/2022 0636  Gross per 24 hour   Intake 720 ml   Output 900 ml   Net -180 ml       Weight:   Wt Readings from Last 3 Encounters:   05/16/22 229 lb 6.4 oz (104.1 kg)   04/18/22 244 lb 6.4 oz (110.9 kg)   03/26/22 220 lb (99.8 kg) Current Inpatient Medications:   sodium chloride flush  5-40 mL IntraVENous 2 times per day    metoprolol succinate  50 mg Oral BID    amiodarone  200 mg Oral TID    [START ON 5/21/2022] amiodarone  200 mg Oral Daily    budesonide  0.5 mg Nebulization BID    And    Arformoterol Tartrate  15 mcg Nebulization BID    venlafaxine  75 mg Oral Daily    melatonin  3 mg Oral Daily    apixaban  5 mg Oral BID    atorvastatin  20 mg Oral Daily    busPIRone  5 mg Oral TID    carbidopa-levodopa  2 tablet Oral TID    lamoTRIgine  200 mg Oral Daily    montelukast  10 mg Oral Nightly    pantoprazole  40 mg Oral QAM AC    rOPINIRole  1 mg Oral TID    sodium chloride flush  5-40 mL IntraVENous 2 times per day    insulin glargine  0.25 Units/kg SubCUTAneous Nightly    insulin lispro  0-6 Units SubCUTAneous TID WC    insulin lispro  0-3 Units SubCUTAneous Nightly       IV Infusions (if any):   sodium chloride      sodium chloride      dextrose         DIAGNOSTIC/ LABORATORY DATA:  Labs:   CBC:   Recent Labs     05/15/22  1634   WBC 8.7   HGB 11.1*   HCT 37.6        BMP:   Recent Labs     05/15/22  0621 05/16/22  0501    138   K 4.3 4.4   CO2 37* 38*   BUN 23 22   CREATININE 1.4* 1.1*   LABGLOM 37 49   CALCIUM 8.9 9.0     Mag:   Recent Labs     05/15/22  0621 05/16/22  0501   MG 2.1 2.0     HgA1c:   Lab Results   Component Value Date    LABA1C 6.5 (H) 05/12/2022     FASTING LIPID PANEL:  Lab Results   Component Value Date    CHOL 95 05/12/2022    HDL 38 05/12/2022    LDLCALC 38 05/12/2022    TRIG 93 05/12/2022       CXR:   Impression   No acute process.             Telemetry: SB 47    Recent Cardiac Test Results:   Cardiac catheterization 1/11/2022: CONCLUSIONS:  Coronary artery disease: Left main.  No significant disease.  LAD.  Heavily calcified proximal and mid vessel with 50% mid vessel stenosis.  60% proximal stenosis of a moderate size first diagonal branch.  LCX.  50% ostial narrowing of the AV groove branch in the mid vessel which is a bifurcation lesion with a large marginal branch which itself did not appear to have any significant disease.  The AV groove branch of the left circumflex continues to provide a posterior descending artery branch and the posterolateral branch (codominant vessel). RCA.  Codominant vessel with no significant angiographic disease. Normal left ventricular size with hyperdynamic left ventricular systolic function with an estimated ejection fraction of 75%. Systemic hypertension.  Elevated left ventricular end-diastolic [id number] (44)  Echocardiogram 1/11/2022:  Left ventricle is normal in size.  Moderate concentric left ventricular hypertrophy. No regional wall motion abnormalities seen.  Ejection fraction is visually estimated at 70+/-5%.  There is doppler evidence of stage I diastolic dysfunction.  Mildly dilated right ventricle.  Right ventricle global systolic function is normal ( TAPSE = 1.8 ). Normal size atria.  No significant valvular abnormalities noted. PAT (4/18/2022): Normal left ventricle size and systolic function. Ejection fraction is visually estimated at 60%. No regional wall motion abnormalities seen. Moderately dilated left atrium. No evidence of thrombus within left atrium or appendage. Agitated saline injected for shunt evaluation. No evidence of patent foramen ovale on bubble study. NAIF emptying velocity 18 cm/sec. Normal right ventricular size and function. Mild mitral regurgitation is present. No hemodynamically significant aortic stenosis is present. Mild tricuspid regurgitation. RVSP is 32 mmHg. Normal estimated PA systolic pressure. No evidence for hemodynamically significant pericardial effusion. Assessment:  AF with RVR, recurrent. Known history of PAF s/p DCCV in 4/2022. Successful DCCV 5/16/2022 with x1 200 joules. Currently bradycardic in the 40's. Acute HFpEF, resolved, clinically euvolemic.    Acute hypoxic respiratory failure, on 4 L NC. Not on O2 at home. SHANTANU on CKD, resolved. Cr. Today 1.1   Moderate CAD on LHC 1/2022, clinically stable   SERENA, not compliant with CPAP  Asthma  Continue tobacco abuse  Morbid obesity   HTN, controlled  HLD, on Lipitor  T2DM, insulin requiring   Chronic iron deficiency anemia, Hgb 11.1 5/15/2022  Hx of Gastric ulcers in 2013  Anxiety/Depression     Plan:  Continue Eliquis and start ASA 81mg daily. Keep metoprolol held at this time due to bradycardia. Patient to be walked to evaluate heart rate with exertion. Stop Amiodarone. Wean oxygen to room air as tolerated. Monitor telemetry. Consider EP consult if significant pauses occur, AV block or recurrence of atrial fibrillation. Continue statin therapy. Optimize GDMT: no beta blocker for now due to bradycardia. May consider SGLT-I as outpatient. Compliance with CPAP strongly encouraged to patient. Aggressive risk modification including weight loss, diet, exercise, and smoking cessation. Rest as per primary service and other consultants. Discussed with Dr Portia Emerson, will follow. Electronically signed by Hanny Vasquez on 5/16/2022 at 12:33 PM     Patient chart reviewed.   I agree with the above assessment made in collaboration with Hanny Vasquez.

## 2022-05-16 NOTE — PROGRESS NOTES
The patient was sitting in bed and her vitals were 125/62, 71, 93% on 2 liters. After ambulating to the [facility] her vitals were 117/92, 93, 91%.

## 2022-05-16 NOTE — ANESTHESIA PRE PROCEDURE
Department of Anesthesiology  Preprocedure Note       Name:  Marvin Grider   Age:  66 y.o.  :  1950                                          MRN:  [id number]         Date:  2022      Surgeon: Cardiology    Procedure: DCCV    Medications prior to admission:   Prior to Admission medications    Medication Sig Start Date End Date Taking? Authorizing Provider   apixaban (ELIQUIS) 5 MG TABS tablet Take 1 tablet by mouth 2 times daily 22   Alaina Spurling, MD   lidocaine 4 % external patch Place 1 patch onto the skin daily Leave on for 12 hrs. Take off for 12 hrs. 22   Alaina Spurling, MD   budesonide-formoterol [facility]) 160-4.5 MCG/ACT AERO Inhale 2 puffs into the lungs 2 times daily 3/19/22   [address], DO   ipratropium-albuterol (DUONEB) 0.5-2.5 (3) MG/3ML SOLN nebulizer solution Inhale 3 mLs into the lungs every 4 hours as needed for Shortness of Breath 3/19/22   [address], DO   albuterol sulfate HFA (PROVENTIL HFA) 108 (90 Base) MCG/ACT inhaler Inhale 2 puffs into the lungs every 6 hours as needed for Wheezing 3/19/22   Eulalio Lugo, DO   rOPINIRole (REQUIP) 1 MG tablet Take 1 tablet by mouth 3 times daily 22   Tressia Filter, APRN - CNP   carbidopa-levodopa (SINEMET CR)  MG per extended release tablet Take 1 tablet by mouth 3 times daily 22   Tressia Filter, APRN - CNP   isosorbide mononitrate (IMDUR) 30 MG extended release tablet Take 1 tablet by mouth daily  Patient not taking: Reported on 2022   Jairon Colón MD   nitroGLYCERIN (NITROSTAT) 0.4 MG SL tablet up to max of 3 total doses. If no relief after 1 dose, call 911.   Patient not taking: Reported on 3/17/2022 1/11/22   Jairon Colón MD   busPIRone (BUSPAR) 5 MG tablet Take 1 tablet by mouth 3 times daily  Patient not taking: Reported on 2022   Jairon Colón MD   insulin glargine (LANTUS) 100 UNIT/ML injection vial Inject 10 Units into the skin nightly 1/11/22   Neftaly Thurman MD   metoprolol succinate (TOPROL XL) 25 MG extended release tablet Take 1 tablet by mouth daily 1/12/22   Neftaly Thurman MD   montelukast (SINGULAIR) 10 MG tablet Take 10 mg by mouth nightly    Historical Provider, MD   ALPRAZolam Mony Rashid) 0.5 MG tablet Take 0.5 mg by mouth daily as needed for Anxiety. Historical Provider, MD   Cholecalciferol (VITAMIN D) 25 MCG TABS Take 1 tablet by mouth daily  Patient not taking: Reported on 4/14/2022 10/26/21   RASHARD Diego - CNP   doxepin (SINEQUAN) 10 MG capsule Take 10 mg by mouth nightly    Historical Provider, MD   venlafaxine (EFFEXOR XR) 150 MG extended release capsule Take 150 mg by mouth daily    Historical Provider, MD   omeprazole (PRILOSEC) 40 MG delayed release capsule Take 40 mg by mouth daily     Historical Provider, MD   blood glucose test strips (ACCU-CHEK RIGOBERTO PLUS) strip 1 each by In Vitro route 2 times daily Indications: DISP ACCU-CHEK As needed. Historical Provider, MD   lamoTRIgine (LAMICTAL) 200 MG tablet Take 200 mg by mouth daily  10/18/19   Historical Provider, MD   atorvastatin (LIPITOR) 20 MG tablet Take 1 tablet by mouth daily 10/21/19   Matt Jung MD   insulin aspart (NOVOLOG FLEXPEN) 100 UNIT/ML injection pen INJECT 0-10 UNITS INTO THE SKIN THREE TIMES DAILY(BEFORE MEALS) SLIDING SCALE (MAX DAILY 30 UNITS)  Patient taking differently: Indications: med. approved 6/1/19-7/29/20 INJECT 0-10 UNITS INTO THE SKIN THREE TIMES DAILY(BEFORE MEALS) SLIDING SCALE (MAX DAILY 30 UNITS) 7/29/19   Matt Jung MD       Current medications:    No current facility-administered medications for this visit. No current outpatient medications on file.      Facility-Administered Medications Ordered in Other Visits   Medication Dose Route Frequency Provider Last Rate Last Admin    sodium chloride flush 0.9 % injection 5-40 mL  5-40 mL IntraVENous 2 times per day Luis Carlos Apple MD   10 mL at 05/15/22 2109    sodium chloride flush 0.9 % injection 5-40 mL  5-40 mL IntraVENous PRN Rhiannon Humphrey MD        0.9 % sodium chloride infusion   IntraVENous PRN Rhiannon Humphrey MD        metoprolol succinate (TOPROL XL) extended release tablet 50 mg  50 mg Oral BID Paramvir S Melissa, DO   50 mg at 05/15/22 2108    amiodarone (CORDARONE) tablet 200 mg  200 mg Oral TID Marlena Gallagher MD   200 mg at 05/15/22 2108    [START ON 5/21/2022] amiodarone (CORDARONE) tablet 200 mg  200 mg Oral Daily Marlena Gallagher MD        albuterol (PROVENTIL) nebulizer solution 2.5 mg  2.5 mg Nebulization Q6H PRN Gideon Brower, DO        budesonide (PULMICORT) nebulizer suspension 500 mcg  0.5 mg Nebulization BID Gideon Brower, DO   500 mcg at 05/15/22 1946    And    Arformoterol Tartrate (BROVANA) nebulizer solution 15 mcg  15 mcg Nebulization BID Gideon Brower, DO   15 mcg at 05/15/22 1946    venlafaxine (EFFEXOR XR) extended release capsule 75 mg  75 mg Oral Daily Ashlyn Lower, APRN - CNP   75 mg at 05/15/22 0851    melatonin tablet 3 mg  3 mg Oral Daily Ashlyn Lower, APRN - CNP   3 mg at 05/15/22 2108    ALPRAZolam (XANAX) tablet 0.5 mg  0.5 mg Oral Daily PRN Gideon Brower, DO   0.5 mg at 05/15/22 2108    apixaban (ELIQUIS) tablet 5 mg  5 mg Oral BID Gideon Brower, DO   5 mg at 05/15/22 2108    atorvastatin (LIPITOR) tablet 20 mg  20 mg Oral Daily Gideon Brower, DO   20 mg at 05/15/22 4382    busPIRone (BUSPAR) tablet 5 mg  5 mg Oral TID Gideon Brower, DO   5 mg at 05/15/22 2109    carbidopa-levodopa (SINEMET CR)  MG per extended release tablet 2 tablet  2 tablet Oral TID Gideon Brower, DO   2 tablet at 05/15/22 2107    ipratropium-albuterol (DUONEB) nebulizer solution 3 mL  3 mL Inhalation Q4H PRN Gideon Brower, DO        lamoTRIgine (LAMICTAL) tablet 200 mg  200 mg Oral Daily Gideon Brower, DO   200 mg at 05/15/22 0851    montelukast (SINGULAIR) tablet 10 mg  10 mg Oral Nightly Gideon Brower, DO   10 mg at 05/15/22 2108    pantoprazole (PROTONIX) tablet 40 mg  40 mg Oral QAM AC Deven Ramal, DO   40 mg at 05/16/22 0604    rOPINIRole (REQUIP) tablet 1 mg  1 mg Oral TID Deven Ramal, DO   1 mg at 05/15/22 2108    sodium chloride flush 0.9 % injection 5-40 mL  5-40 mL IntraVENous 2 times per day Deven Ramal, DO   10 mL at 05/15/22 8257    sodium chloride flush 0.9 % injection 5-40 mL  5-40 mL IntraVENous PRN Deven Ramal, DO   10 mL at 05/13/22 1840    0.9 % sodium chloride infusion   IntraVENous PRN Deven Ramal, DO        ondansetron (ZOFRAN-ODT) disintegrating tablet 4 mg  4 mg Oral Q8H PRN Deven Ramal, DO        Or    ondansetron [facility]) injection 4 mg  4 mg IntraVENous Q6H PRN Dveen Ramal, DO        polyethylene glycol (GLYCOLAX) packet 17 g  17 g Oral Daily PRN Deven Ramal, DO        acetaminophen (TYLENOL) tablet 650 mg  650 mg Oral Q6H PRN Deven Ramal, DO   650 mg at 05/12/22 1617    Or    acetaminophen (TYLENOL) suppository 650 mg  650 mg Rectal Q6H PRN Deven Ramal, DO        glucose chewable tablet 16 g  4 tablet Oral PRN Deven Ramal, DO        dextrose bolus 10% 125 mL  125 mL IntraVENous PRN Deven Ramal, DO        Or    dextrose bolus 10% 250 mL  250 mL IntraVENous PRN Deven Ramal, DO        glucagon (rDNA) injection 1 mg  1 mg IntraMUSCular PRN Deven Ramal, DO        dextrose 5 % solution  100 mL/hr IntraVENous PRN Deven Ramal, DO        insulin glargine-yfgn [facility]) injection vial 28 Units  0.25 Units/kg SubCUTAneous Nightly Deven Ramal, DO   28 Units at 05/15/22 2140    insulin lispro (HUMALOG) injection vial 0-6 Units  0-6 Units SubCUTAneous TID WC Deven Ramal, DO   3 Units at 05/15/22 1200    insulin lispro (HUMALOG) injection vial 0-3 Units  0-3 Units SubCUTAneous Nightly Deven Ramal, DO   1 Units at 05/15/22 2140       Allergies:     Allergies   Allergen Reactions    Ciprofloxacin Nausea And Vomiting    Codeine Itching     Creepy crawly \" feelings       Problem List:    Patient Active Problem List   Diagnosis Code    Depression with anxiety F41.8    Osteoporosis M81.0    Asthma J45.909    Hyperlipidemia E78.5    Mitral regurgitation I34.0    Obstructive sleep apnea syndrome G47.33    Psoriasis L40.9    Diabetes mellitus ([facility] E11.9    Parkinson's disease ([facility] G20    Primary hypertension I10    Microalbuminuria R80.9    Morbid obesity (HCC) E66.01    RLS (restless legs syndrome) G25.81    Generalized weakness R53.1    Inability to walk R26.2    Hypothyroidism E03.9    Chest pain R07.9    Acute asthma exacerbation J45. 901    Asthma exacerbation, mild J45. [place] onset a-fib (HCC) I48.91    Atrial fibrillation with RVR (HCC) I48.91    Acute on chronic congestive heart failure (HCC) I50.9    Coronary artery disease involving native coronary artery of native heart without angina pectoris I25.10    Dysphagia R13.10    Hepatosplenomegaly R16.2    Heart failure (HCC) I50.9    Acute diastolic (congestive) heart failure (HCC) I50.31    Nonrheumatic tricuspid valve regurgitation I36.1    Tobacco abuse Z72.0       Past Medical History:        Diagnosis Date    Acute on chronic congestive heart failure (HCC)     Anxiety     Asthma     CAD (coronary artery disease) 01/21/2016    Cancer ([facility]  breast ca 2006    right lumpectomy    Chronic kidney disease     nephrolithiasis    Depression     Diabetes mellitus ([facility]     H/O mammogram     Hx MRSA infection     toe infection january 2012    Hyperlipidemia     Hypertension     Lateral epicondylitis     SERENA on CPAP     [facility]     Tubal ligation status        Past Surgical History:        Procedure Laterality Date    BREAST LUMPECTOMY      BREAST REDUCTION SURGERY      CARDIAC CATHETERIZATION  4/28/2014    Dr. Kiarra uLna  01/11/2022    Dr. Alecia Egan  7/29/15    ENDOSCOPY, COLON, DIAGNOSTIC 7/19/15    GALLBLADDER SURGERY      LUMBAR LAMINECTOMY      TOE AMPUTATION      TONSILLECTOMY      UPPER GASTROINTESTINAL ENDOSCOPY         Social History:    Social History     Tobacco Use    Smoking status: Never Smoker    Smokeless tobacco: Never Used   Substance Use Topics    Alcohol use: No                                Counseling given: Not Answered      Vital Signs (Current): There were no vitals filed for this visit. BP Readings from Last 3 Encounters:   05/15/22 130/84   04/19/22 118/63   04/18/22 (!) 109/53       NPO Status:  >8 hrs                                                                               BMI:   Wt Readings from Last 3 Encounters:   05/16/22 229 lb 6.4 oz (104.1 kg)   04/18/22 244 lb 6.4 oz (110.9 kg)   03/26/22 220 lb (99.8 kg)     There is no height or weight on file to calculate BMI.    CBC:   Lab Results   Component Value Date    WBC 8.7 05/15/2022    RBC 4.34 05/15/2022    HGB 11.1 05/15/2022    HCT 37.6 05/15/2022    MCV 86.6 05/15/2022    RDW 16.8 05/15/2022     05/15/2022       CMP:   Lab Results   Component Value Date     05/16/2022    K 4.4 05/16/2022    K 4.5 05/11/2022    CL 93 05/16/2022    CO2 38 05/16/2022    BUN 22 05/16/2022    CREATININE 1.1 05/16/2022    GFRAA 59 05/16/2022    LABGLOM 49 05/16/2022    GLUCOSE 106 05/16/2022    PROT 6.2 05/11/2022    CALCIUM 9.0 05/16/2022    BILITOT 0.6 05/11/2022    ALKPHOS 124 05/11/2022    AST 8 05/11/2022    ALT <5 05/11/2022       POC Tests: No results for input(s): POCGLU, POCNA, POCK, POCCL, POCBUN, POCHEMO, POCHCT in the last 72 hours. Coags:   Lab Results   Component Value Date    PROTIME 13.9 04/15/2022    INR 1.3 04/15/2022    APTT 32.2 02/04/2021       HCG (If Applicable): No results found for: PREGTESTUR, PREGSERUM, HCG, HCGQUANT     ABGs: No results found for: PHART, PO2ART, LBV1TVG, DBF0QRY, BEART, I2NCACJC     Type & Screen (If Applicable):   No results found for: Minnie Navarro    Drug/Infectious Status (If Applicable):  No results found for: HIV, HEPCAB    COVID-19 Screening (If Applicable):   Lab Results   Component Value Date    COVID19 NOT DETECTED 05/11/2022    COVID19 Not Detected 03/17/2022     EKG 4/18/2022  Sinus bradycardia  Low voltage QRS  Nonspecific ST and T wave abnormality  Abnormal ECG  When compared with ECG of 15-APR-2022 02:45,  Atrial fibrillation resolved  Confirmed by Alexis Ganramana ([id number]) on 4/18/2022 11:16:58 AM    ECHO 4/18/2022   Summary   Normal left ventricle size and systolic function. Ejection fraction is visually estimated at 60%. No regional wall motion abnormalities seen. Moderately dilated left atrium. No evidence of thrombus within left atrium or appendage. Agitated saline injected for shunt evaluation. No evidence of patent foramen ovale on bubble study. NAIF emptying velocity 18 cm/sec. Normal right ventricular size and function. Mild mitral regurgitation is present. No hemodynamically significant aortic stenosis is present. Mild tricuspid regurgitation. RVSP is 32 mmHg. Normal estimated PA systolic pressure. No evidence for hemodynamically significant pericardial effusion. Anesthesia Evaluation  Patient summary reviewed and Nursing notes reviewed no history of anesthetic complications:   Airway: Mallampati: III  TM distance: >3 FB   Neck ROM: full  Mouth opening: > = 3 FB Dental:    (+) upper dentures, lower dentures and edentulous      Pulmonary:   (+) COPD:  shortness of breath:  sleep apnea: on CPAP,  decreased breath sounds,  wheezes,  asthma:     (-) not a current smoker          Patient did not smoke on day of surgery.                  Cardiovascular:  Exercise tolerance: poor (<4 METS),   (+) hypertension: mild, valvular problems/murmurs: MR, CAD: obstructive, dysrhythmias (RVR): atrial fibrillation, CHF (Acute on chronic HFpEF):, MORGAN:, hyperlipidemia      ECG reviewed  Rhythm: irregular  Rate: abnormal  Echocardiogram reviewed    Cleared by cardiology     Beta Blocker:  Dose within 24 Hrs      ROS comment: EKG:  Atrial fibrillation with rapid ventricular response  Low voltage QRS  Nonspecific ST abnormality  Abnormal ECG  When compared with ECG of 13-NOV-2021 19:45,  Atrial fibrillation has replaced Sinus rhythm    ECHO:  Left ventricle is normal in size. Moderate concentric left ventricular hypertrophy. No regional wall motion abnormalities seen. Ejection fraction is visually estimated at 70+/-5%. There is doppler evidence of stage I diastolic dysfunction. Mildly dilated right ventricle. Right ventricle global systolic function is normal ( TAPSE = 1.8 ). Normal size atria. No significant valvular abnormalities noted     Neuro/Psych:   (+) neuromuscular disease: Parkinson's disease, psychiatric history:depression/anxiety              ROS comment: Ambulatory dysfunction GI/Hepatic/Renal:   (+) GERD: no interval change, liver disease:, renal disease (Creatinine 1.3 & GFR 40): kidney stones and CRI, morbid obesity (BMI 45.3 kg/m2)         ROS comment: Dysphagia. Endo/Other:    (+) Diabetes (A1C 6.9%)Type II DM, using insulin, hypothyroidism, blood dyscrasia (Hbg 11.1 g/dL; Eliquis): anemia and anticoagulation therapy, arthritis (S/P lumbar laminectomy): OA., malignancy/cancer (Breast s/p right lumpectomy). Pt had no PAT visit        ROS comment: RLS  Psoriasis  Osteoporosis Abdominal:   (+) obese,           Vascular: negative vascular ROS. Other Findings:               Anesthesia Plan      MAC     ASA 4       Induction: intravenous. Anesthetic plan and risks discussed with patient. Plan discussed with attending.               Scott Lucas Age   5/16/2022

## 2022-05-16 NOTE — PROCEDURES
Preprocedure diagnosis:  Persistent  atrial fibrillation  Procedures, cardioversion elective arrhythmia external    Preprocedure diagnosis: A. Fib/flutter    Prior tests anticoagulated    Primary procedure  Written informed consent was obtained, the car;dioversion was performed under stable fasting condition, self-adhesive anterior-posterior defibrillation pads were applied, the defibrillator was programmed to deliver energy in a synchronized fashion with a EKG. The patient was set up for monitoring of surface EKG and pulse oximetry continuously. Blood pressure was monitored and with automatic cuff measurements. Supplemental oxygen was administered. The procedure was performed under anesthesia by anesthesiology. After ensuring adequate anesthesia, DC CV was performed by delivering 200 J of direct current delivered in a synchronized fashion. Result: Successful cardioversion to sinus rhythm, confirmed on 12-lead EKG. Complication: None    Patient was monitored until awake and vitals remained stable. Angy Rosado M.D.   [facility]

## 2022-05-16 NOTE — PROGRESS NOTES
Hospitalist Progress Note      SYNOPSIS: Patient admitted on 5/11/2022 for Heart failure [facility])      SUBJECTIVE:    Presented to the emergency department with concerns about hallucinations. Family reports she has been hallucinating. In the emergency department patient is not hallucinating. Vital signs reveal the patient to be tachycardic with a rate in 130s. EKG showed atrial fibrillation with RVR. Patient has a history atrial fibrillation and is anticoagulated on Eliquis. The remainder of her vital signs are within normal limits and stable. Laboratory studies demonstrate CO2 30, BUN 24, creatinine 1.3, glucose 165, proBNP 7325, troponin of 21, hemoglobin 9.3. Patient was given a dose of Lasix and medicine was consulted for admission for treatment of fluid overload/heart failure exacerbation. 5/14 - Patient assessed at bedside and found to be sleeping in bed. Patient awoke and was alert and oriented, answering questions appropriately. Patient nasal cannula was found to not be in the patients nose, however the patient did not complain of any dyspnea. Nasal cannula corrected and placed in patients nose. Patient inconsistent with statement as to whether she did or did not have hallucinations. Patient stated that she did not want implanted defibrillator because she did not know much about it and that she knew her  had one when he passed away. Patient has no medical complaints at time of assessment. 5/15 - Patient assessed at bedside, alert and oriented, answering questions appropriately. Patient states that nursing staff took her N/C off of her earlier and she was unable to maintain saturation on room air. Patient also states that she attempted to ambulate yesterday and was only able to make it to the end of the bed without having to sit down. Patient denies any use of home oxygen and states that she gets around with a rolling walker.   Possible cardioversion tomorrow per cardiology note.      5/16 - Patient assessed at bedside, alert and oriented, answering questions appropriately. Patient states that she received cardioversion earlier today and tolerated it well. Patient states that she feels less short of breath than before cardioversion. Auscultation of heart tones reveals a normal rhythm, slightly bradycardic at approximately 54. Patient states desire to try to ambulate today. Order placed to ambulate patient on oxygen as tolerated. Patient denies any further medical questions or concerns. Patient seen and examined  Records reviewed. No events overnight  Resting in bed  No fever or chills  No cp/ct/orthopnea      DIET: ADULT DIET; Regular; 4 carb choices (60 gm/meal)  CODE: Full Code    Intake/Output Summary (Last 24 hours) at 5/16/2022 3369  Last data filed at 5/16/2022 0636  Gross per 24 hour   Intake 180 ml   Output 900 ml   Net -720 ml       OBJECTIVE:    /65   Pulse (!) 43   Temp 98.9 °F (37.2 °C) (Oral)   Resp 19   Ht 5' (1.524 m)   Wt 229 lb 6.4 oz (104.1 kg)   SpO2 100%   BMI 44.80 kg/m²     General appearance: No apparent distress, appears stated age and cooperative. HEENT:  Conjunctivae/corneas clear. Neck: Supple. No jugular venous distention. Respiratory: Clear to auscultation bilaterally, normal respiratory effort  Cardiovascular: Regular rate rhythm, normal S1-S2  Abdomen: Soft, nontender, nondistended  Musculoskeletal: No clubbing, cyanosis, no bilateral lower extremity edema. Brisk capillary refill. Skin:  No rashes  on visible skin  Neurologic: awake, alert and following commands     ASSESSMENT:    Principal Problem:    Heart failure (HCC)  Active Problems:    Acute diastolic (congestive) heart failure (HCC)    Nonrheumatic tricuspid valve regurgitation    Tobacco abuse  Resolved Problems:    * No resolved hospital problems. *       PLAN:     68 y. o. year old [de-identified]  has a past medical history of Acute on chronic congestive heart failure ([facility] [address] Anxiety, Asthma, CAD (coronary artery disease), Cancer ([facility] [address] Chronic kidney disease, Depression, Diabetes mellitus ([address] H/O mammogram, Hx MRSA infection, Hyperlipidemia, Hypertension, Lateral epicondylitis, SERENA on CPAP, Parkinson's disease ([facility] [address] and Tubal ligation status.         Presented to the emergency department with concerns about hallucinations.  Family reports she has been hallucinating.  In the emergency department patient is not hallucinating.  Vital signs reveal the patient to be tachycardic with a rate in 130s.   EKG showed atrial fibrillation with RVR.  Patient has a history atrial fibrillation and is anticoagulated on Eliquis.       -Acute on chronic congestive heart failure  Admitted with volume overload  Diurese  Consult cardiology     -Atrial fibrillation with rapid ventricular response  Admitted on cardizem drip for rate control   Sc cardizem drip   Make toprol xl 50 mg bid  janes  Cardio consulted     -Hallucinations - Anxiety/depression  Psych consulted for assist     -Hypertension  Stable     -Type 2 diabetes  accucheck with coverage     -Hyperlipidemia  Statin     -CKD stage III  Follow creat    DISPOSITION: pending    Medications:  REVIEWED DAILY    Infusion Medications    sodium chloride      sodium chloride      dextrose       Scheduled Medications    aspirin  81 mg Oral Daily    sodium chloride flush  5-40 mL IntraVENous 2 times per day    budesonide  0.5 mg Nebulization BID    And    Arformoterol Tartrate  15 mcg Nebulization BID    venlafaxine  75 mg Oral Daily    melatonin  3 mg Oral Daily    apixaban  5 mg Oral BID    atorvastatin  20 mg Oral Daily    busPIRone  5 mg Oral TID    carbidopa-levodopa  2 tablet Oral TID    lamoTRIgine  200 mg Oral Daily    montelukast  10 mg Oral Nightly    pantoprazole  40 mg Oral QAM AC    rOPINIRole  1 mg Oral TID    sodium chloride flush  5-40 mL IntraVENous 2 times per day    insulin glargine  0.25 Units/kg SubCUTAneous Nightly    insulin lispro  0-6 Units SubCUTAneous TID WC    insulin lispro  0-3 Units SubCUTAneous Nightly     PRN Meds: sodium chloride flush, sodium chloride, albuterol, ALPRAZolam, ipratropium-albuterol, sodium chloride flush, sodium chloride, ondansetron **OR** ondansetron, polyethylene glycol, acetaminophen **OR** acetaminophen, glucose, dextrose bolus **OR** dextrose bolus, glucagon (rDNA), dextrose    Labs:     Recent Labs     05/15/22  1634   WBC 8.7   HGB 11.1*   HCT 37.6          Recent Labs     05/14/22  0622 05/15/22  0621 05/16/22  0501    141 138   K 4.7 4.3 4.4   CL 92* 94* 93*   CO2 36* 37* 38*   BUN 26* 23 22   CREATININE 1.4* 1.4* 1.1*   CALCIUM 8.5* 8.9 9.0       No results for input(s): PROT, ALB, ALKPHOS, ALT, AST, BILITOT, AMYLASE, LIPASE in the last 72 hours. No results for input(s): INR in the last 72 hours. No results for input(s): Dona Pace in the last 72 hours. Chronic labs:    Lab Results   Component Value Date    CHOL 95 05/12/2022    TRIG 93 05/12/2022    HDL 38 05/12/2022    LDLCALC 38 05/12/2022    TSH 1.030 04/14/2022    INR 1.3 04/15/2022    LABA1C 6.5 (H) 05/12/2022       Radiology: REVIEWED DAILY    +++++++++++++++++++++++++++++++++++++++++++++++++  RASHARD Kirkpatrick - CNP  [facility] Physician - [address] [place], [place]  +++++++++++++++++++++++++++++++++++++++++++++++++  NOTE: This report was transcribed using voice recognition software. Every effort was made to ensure accuracy; however, inadvertent computerized transcription errors may be present.

## 2022-05-16 NOTE — ANESTHESIA POSTPROCEDURE EVALUATION
Department of Anesthesiology  Postprocedure Note    Patient: Jermaine Horan  MRN: [id number]  YOB: 1950  Date of evaluation: 5/16/2022  Time:  10:21 AM     Procedure Summary     Date: 05/16/22 [place] [facility]    Anesthesia Start: 7758 Anesthesia Stop: 3488    Procedure: CARDIOVERSION WITH ANESTHESIA Diagnosis:     Scheduled Providers:  Responsible Provider: Jatinder Brown MD    Anesthesia Type: MAC ASA Status: 4          Anesthesia Type: No value filed. Ifeoma Phase I:      Ifeoma Phase II:      Last vitals: Reviewed and per EMR flowsheets.        Anesthesia Post Evaluation    Patient location during evaluation: PACU  Patient participation: complete - patient participated  Level of consciousness: awake  Pain score: 0  Airway patency: patent  Nausea & Vomiting: no nausea  Complications: no  Cardiovascular status: hemodynamically stable  Respiratory status: acceptable  Hydration status: stable

## 2022-05-17 VITALS
BODY MASS INDEX: 44.59 KG/M2 | DIASTOLIC BLOOD PRESSURE: 59 MMHG | OXYGEN SATURATION: 100 % | TEMPERATURE: 98 F | SYSTOLIC BLOOD PRESSURE: 112 MMHG | HEART RATE: 53 BPM | WEIGHT: 227.1 LBS | RESPIRATION RATE: 20 BRPM | HEIGHT: 60 IN

## 2022-05-17 PROBLEM — R55 RECURRENT SYNCOPE: Status: ACTIVE | Noted: 2022-01-01

## 2022-05-17 LAB
EKG ATRIAL RATE: 117 BPM
EKG ATRIAL RATE: 43 BPM
EKG P AXIS: 20 DEGREES
EKG P-R INTERVAL: 230 MS
EKG Q-T INTERVAL: 306 MS
EKG Q-T INTERVAL: 442 MS
EKG QRS DURATION: 86 MS
EKG QRS DURATION: 86 MS
EKG QTC CALCULATION (BAZETT): 373 MS
EKG QTC CALCULATION (BAZETT): 468 MS
EKG R AXIS: 102 DEGREES
EKG R AXIS: 65 DEGREES
EKG T AXIS: 58 DEGREES
EKG T AXIS: 74 DEGREES
EKG VENTRICULAR RATE: 141 BPM
EKG VENTRICULAR RATE: 43 BPM
METER GLUCOSE: 134 MG/DL (ref 74–99)
METER GLUCOSE: 146 MG/DL (ref 74–99)
PRO-BNP: 2369 PG/ML (ref 0–125)

## 2022-05-17 PROCEDURE — 83880 ASSAY OF NATRIURETIC PEPTIDE: CPT

## 2022-05-17 PROCEDURE — 6370000000 HC RX 637 (ALT 250 FOR IP): Performed by: PHYSICIAN ASSISTANT

## 2022-05-17 PROCEDURE — 97535 SELF CARE MNGMENT TRAINING: CPT

## 2022-05-17 PROCEDURE — 6370000000 HC RX 637 (ALT 250 FOR IP): Performed by: FAMILY MEDICINE

## 2022-05-17 PROCEDURE — 6370000000 HC RX 637 (ALT 250 FOR IP): Performed by: NURSE PRACTITIONER

## 2022-05-17 PROCEDURE — 97165 OT EVAL LOW COMPLEX 30 MIN: CPT

## 2022-05-17 PROCEDURE — 97161 PT EVAL LOW COMPLEX 20 MIN: CPT

## 2022-05-17 PROCEDURE — 93246 EXT ECG>7D<15D RECORDING: CPT

## 2022-05-17 PROCEDURE — 99232 SBSQ HOSP IP/OBS MODERATE 35: CPT | Performed by: INTERNAL MEDICINE

## 2022-05-17 PROCEDURE — 97530 THERAPEUTIC ACTIVITIES: CPT

## 2022-05-17 PROCEDURE — 2580000003 HC RX 258: Performed by: INTERNAL MEDICINE

## 2022-05-17 PROCEDURE — 2700000000 HC OXYGEN THERAPY PER DAY

## 2022-05-17 PROCEDURE — 94640 AIRWAY INHALATION TREATMENT: CPT

## 2022-05-17 PROCEDURE — 82962 GLUCOSE BLOOD TEST: CPT

## 2022-05-17 PROCEDURE — 6360000002 HC RX W HCPCS: Performed by: FAMILY MEDICINE

## 2022-05-17 PROCEDURE — 36415 COLL VENOUS BLD VENIPUNCTURE: CPT

## 2022-05-17 RX ORDER — ALPRAZOLAM 0.5 MG/1
0.5 TABLET ORAL DAILY PRN
Qty: 3 TABLET | Refills: 0 | Status: SHIPPED | OUTPATIENT
Start: 2022-05-17 | End: 2022-06-16

## 2022-05-17 RX ORDER — LANOLIN ALCOHOL/MO/W.PET/CERES
3 CREAM (GRAM) TOPICAL DAILY
Qty: 1 TABLET | Refills: 3 | Status: ON HOLD
Start: 2022-05-17 | End: 2022-07-07

## 2022-05-17 RX ORDER — INSULIN GLARGINE 100 [IU]/ML
28 INJECTION, SOLUTION SUBCUTANEOUS NIGHTLY
Qty: 10 ML | Refills: 3
Start: 2022-05-17 | End: 2022-05-17 | Stop reason: SDUPTHER

## 2022-05-17 RX ORDER — ASPIRIN 81 MG/1
81 TABLET ORAL DAILY
Qty: 30 TABLET | Refills: 0
Start: 2022-05-17

## 2022-05-17 RX ORDER — VENLAFAXINE HYDROCHLORIDE 75 MG/1
75 CAPSULE, EXTENDED RELEASE ORAL DAILY
Qty: 30 CAPSULE | Refills: 3 | Status: ON HOLD
Start: 2022-05-18 | End: 2022-07-07

## 2022-05-17 RX ORDER — INSULIN GLARGINE 100 [IU]/ML
10 INJECTION, SOLUTION SUBCUTANEOUS NIGHTLY
Qty: 10 ML | Refills: 3 | Status: ON HOLD
Start: 2022-05-17 | End: 2022-07-07

## 2022-05-17 RX ORDER — METOPROLOL SUCCINATE 25 MG/1
12.5 TABLET, EXTENDED RELEASE ORAL DAILY
Qty: 30 TABLET | Refills: 0
Start: 2022-05-17 | End: 2022-05-17 | Stop reason: HOSPADM

## 2022-05-17 RX ADMIN — ATORVASTATIN CALCIUM 20 MG: 20 TABLET, FILM COATED ORAL at 08:39

## 2022-05-17 RX ADMIN — ROPINIROLE HYDROCHLORIDE 1 MG: 1 TABLET, FILM COATED ORAL at 14:09

## 2022-05-17 RX ADMIN — CARBIDOPA AND LEVODOPA 2 TABLET: 25; 100 TABLET, EXTENDED RELEASE ORAL at 08:39

## 2022-05-17 RX ADMIN — ROPINIROLE HYDROCHLORIDE 1 MG: 1 TABLET, FILM COATED ORAL at 08:39

## 2022-05-17 RX ADMIN — BUDESONIDE 500 MCG: 0.5 SUSPENSION RESPIRATORY (INHALATION) at 09:16

## 2022-05-17 RX ADMIN — BUSPIRONE HYDROCHLORIDE 5 MG: 5 TABLET ORAL at 08:39

## 2022-05-17 RX ADMIN — ASPIRIN 81 MG 81 MG: 81 TABLET ORAL at 08:39

## 2022-05-17 RX ADMIN — ARFORMOTEROL TARTRATE 15 MCG: 15 SOLUTION RESPIRATORY (INHALATION) at 09:16

## 2022-05-17 RX ADMIN — CARBIDOPA AND LEVODOPA 2 TABLET: 25; 100 TABLET, EXTENDED RELEASE ORAL at 14:09

## 2022-05-17 RX ADMIN — BUSPIRONE HYDROCHLORIDE 5 MG: 5 TABLET ORAL at 14:09

## 2022-05-17 RX ADMIN — LAMOTRIGINE 200 MG: 100 TABLET ORAL at 08:39

## 2022-05-17 RX ADMIN — VENLAFAXINE HYDROCHLORIDE 75 MG: 75 CAPSULE, EXTENDED RELEASE ORAL at 08:39

## 2022-05-17 RX ADMIN — PANTOPRAZOLE SODIUM 40 MG: 40 TABLET, DELAYED RELEASE ORAL at 06:01

## 2022-05-17 RX ADMIN — APIXABAN 5 MG: 5 TABLET, FILM COATED ORAL at 08:39

## 2022-05-17 RX ADMIN — Medication 10 ML: at 08:39

## 2022-05-17 NOTE — DISCHARGE INSTR - COC
Continuity of Care Form    Patient Name: Randee Buck   :    MRN:  [id number]    Admit date:  2022  Discharge date:  22    Code Status Order: Full Code   Advance Directives:      Admitting Physician:  Thi Rosenberg DO  PCP: No primary care provider on file. Discharging Nurse: [facility] Unit/Room#: 4199/2051-V  Discharging Unit Phone Number: [phone]    Emergency Contact:   Extended Emergency Contact Information  Primary Emergency Contact: Jose Alberto Bond [address] Phone: [phone]  Mobile Phone: [phone]  Relation: Child   needed? No  Secondary Emergency Contact: July Mcmahon  Mobile Phone: [phone]  Relation: Child   needed?  No    Past Surgical History:  Past Surgical History:   Procedure Laterality Date    BREAST LUMPECTOMY      BREAST REDUCTION SURGERY      CARDIAC CATHETERIZATION  2014    Dr. Simone Bustamante  2022    Dr. Gary Felder  7/29/15    ENDOSCOPY, COLON, DIAGNOSTIC  7/19/15    GALLBLADDER SURGERY      LUMBAR LAMINECTOMY      TOE AMPUTATION      TONSILLECTOMY      UPPER GASTROINTESTINAL ENDOSCOPY         Immunization History:   Immunization History   Administered Date(s) Administered    Influenza Vaccine, unspecified formulation 10/15/2014    Influenza Virus Vaccine 10/31/2008, 10/05/2011    Influenza, High Dose (Fluzone 65 yrs and older) 2015, 2016, 2017, 2019    Influenza, Triv, inactivated, subunit, adjuvanted, IM (Fluad 65 yrs and older) 2019    Pneumococcal Conjugate 13-valent (Sgsdeee12) 2016    Pneumococcal Polysaccharide (Wmkfhfwig21) 2012, 2018    Td, unspecified formulation 2014    Tdap (Boostrix, Adacel) 2015    Zoster Live (Zostavax) 2013    Zoster Recombinant (Shingrix) 12/10/2019       Active Problems:  Patient Active Problem List   Diagnosis Code    Depression with anxiety F41.8    Osteoporosis M81.0    Asthma J45.909    Hyperlipidemia E78.5    Mitral regurgitation I34.0    Obstructive sleep apnea syndrome G47.33    Psoriasis L40.9    Diabetes mellitus ([facility] [address] E11.9    Parkinson's disease ([facility] [address] G20    Primary hypertension I10    Microalbuminuria R80.9    Morbid obesity (HCC) E66.01    RLS (restless legs syndrome) G25.81    Generalized weakness R53.1    Inability to walk R26.2    Hypothyroidism E03.9    Chest pain R07.9    Acute asthma exacerbation J45.901    Asthma exacerbation, mild J45.901    New onset a-fib (HCC) I48.91    Atrial fibrillation with RVR ([facility]) I48.91    Acute on chronic congestive heart failure (HCC) I50.9    Coronary artery disease involving native coronary artery of native heart without angina pectoris I25.10    Dysphagia R13.10    Hepatosplenomegaly R16.2    Heart failure (HCC) T93.1    Acute diastolic (congestive) heart failure (HCC) I50.31    Nonrheumatic tricuspid valve regurgitation I36.1    Tobacco abuse Z72.0    Recurrent syncope R55       Isolation/Infection:   Isolation            No Isolation          Patient Infection Status       Infection Onset Added Last Indicated Last Indicated By Review Planned Expiration Resolved Resolved By    None active    Resolved    COVID-19 (Rule Out) 05/11/22 05/11/22 05/11/22 COVID-19 & Influenza Combo (Ordered)   05/11/22 Rule-Out Test Resulted    COVID-19 (Rule Out) 03/17/22 03/17/22 03/17/22 Respiratory Panel, Molecular, with COVID-19 (Restricted: peds pts or suitable admitted adults) (Ordered)   03/17/22 Rule-Out Test Resulted    COVID-19 (Rule Out) 03/16/22 03/16/22 03/16/22 COVID-19, Rapid (Ordered)   03/16/22 Rule-Out Test Resulted    C-diff Rule Out 01/10/22 01/10/22 01/10/22 C. difficile toxin Molecular (Ordered)   03/17/22 Sis Washingtonceled  Mary Toledo RN 1/11/22 1300     COVID-19 (Rule Out) 01/09/22 01/09/22 01/09/22 COVID-19, Rapid (Ordered)   01/09/22 Rule-Out Test Resulted    MRSA 05/19/21 05/21/21 05/19/21 Culture, Wound   10/25/21 Zohaib Jo RN    COVID-19 (Rule Out) 02/04/21 02/04/21 02/04/21 COVID-19 (Ordered)   02/04/21 Rule-Out Test Resulted            Nurse Assessment:  Last Vital Signs: BP (!) 112/59   Pulse 53   Temp 98 °F (36.7 °C) (Oral)   Resp 20   Ht 5' (1.524 m)   Wt 227 lb 1.6 oz (103 kg)   SpO2 100%   BMI 44.35 kg/m²     Last documented pain score (0-10 scale): Pain Level: 0  Last Weight:   Wt Readings from Last 1 Encounters:   05/17/22 227 lb 1.6 oz (103 kg)     Mental Status:  {IP PT MENTAL STATUS:20030}    IV Access:  { DALLAS IV ACCESS:124493766}    Nursing Mobility/ADLs:  Walking   {CHP DME ZMBL:339459404}  Transfer  {P DME CSMV:069817559}  Bathing  {P DME HBDB:539034362}  Dressing  {CHP DME FNCC:372614411}  Toileting  {CHP DME KNIB:064319956}  Feeding  {P DME JWII:369923731}  Med Admin  {P DME FPNX:453076811}  Med Delivery   { DALLAS MED Delivery:602237176}    Wound Care Documentation and Therapy:  Wound Toe (Comment  which one) Anterior; Left (Active)   Number of days:         Elimination:  Continence: Bowel: {YES / NP:99531}  Bladder: {YES / TM:15092}  Urinary Catheter: {Urinary Catheter:967762946}   Colostomy/Ileostomy/Ileal Conduit: {YES / AW:20268}       Date of Last BM: 5/14/22  No intake or output data in the 24 hours ending 05/17/22 1311  I/O last 3 completed shifts:   In: 180 [P.O.:180]  Out: 900 [Urine:900]    Safety Concerns:     [address] Safety Concerns:696702359}    Impairments/Disabilities:      [address] Impairments/Disabilities:147067550}    Nutrition Therapy:  Current Nutrition Therapy:   [address] Diet List:857499882}    Routes of Feeding: {CHP DME Other Feedings:233671378}  Liquids: {Slp liquid thickness:72285}  Daily Fluid Restriction: {CHP DME Yes amt example:214494601}  Last Modified Barium Swallow with Video (Video Swallowing Test): {Done Not Done FODI:076200321}    Treatments at the Time of Hospital Discharge:   Respiratory Treatments: ***  Oxygen Therapy:  {Therapy; copd oxygen:33445}  Ventilator:    {[facility] Vent TYFF:180842950}    Rehab Therapies: {THERAPEUTIC INTERVENTION:9055675253}  Weight Bearing Status/Restrictions: 50Nisha ANDRES Weight Bearin}  Other Medical Equipment (for information only, NOT a DME order):  {EQUIPMENT:586347028}  Other Treatments: ***    Patient's personal belongings (please select all that are sent with patient):  {[facility] DME Belongings:604038033}    RN SIGNATURE:  {Esignature:076591231}    CASE MANAGEMENT/SOCIAL WORK SECTION    Inpatient Status Date: ***    Readmission Risk Assessment Score:  Readmission Risk              Risk of Unplanned Readmission:  32           Discharging to Facility/ Agency   Name:   Address:  Phone:  Fax:    Dialysis Facility (if applicable)   Name:  Address:  Dialysis Schedule:  Phone:  Fax:    / signature: {Esignature:587647078}    PHYSICIAN SECTION    Prognosis: {Prognosis:6122307625}    Condition at Discharge: 50Nisha Ye Patient Condition:362127822}    Rehab Potential (if transferring to Rehab): {Prognosis:8346418180}    Recommended Labs or Other Treatments After Discharge: ***    Physician Certification: I certify the above information and transfer of Randee Buck  is necessary for the continuing treatment of the diagnosis listed and that she requires {Admit to Appropriate Level of Care:96354} for {GREATER/LESS:800160178} 30 days.      Update Admission H&P: {CHP DME Changes in VJOSY:147495767}    PHYSICIAN SIGNATURE:  {Esignature:315321396}

## 2022-05-17 NOTE — CARE COORDINATION
Care Coordination:  Dc order noted, updated therapy notes called for to start precert. I spoke to pt at bedside to confirm return, she states she now does not want to return as \" they are mean to her and do not answer call lights\". I asked if she would like to speak to DON from facility, she agreed. I called Liaison Paulina Henriquez and she is coordinating and I made sure she has phone near her and in reach and that it is functioning. She does not want me to call son as she states, \" he will kill me if I don't return to facility\". Will follow    Izaiah Otero    Addendum: KETURAH spoke to pt and they plan to change rooms at facility and she is good with returning.  Therapy working with her now and will determine need for ambulance or ambulette    Izaiah Otero

## 2022-05-17 NOTE — PROGRESS NOTES
Physical Therapy  Physical Therapy Initial Assessment     Name: Jermaine Horan  :   MRN: [id number]      Date of Service: 2022    Evaluating PT:  Destiny Mcnamara PT, DPT [id number]     Room #:  4596/5048-R  Diagnosis:  Heart failure ([facility] [address] [I50.9]  Reason for admission: hallucinations   Precautions:  Falls, O2  Procedure/Surgery:  None   Equipment Recommendations:  FWW    SUBJECTIVE:  Pt admitted from [facility]. Active with rehab. States completing SPT <> wheelchair with assist. States she has not been ambulating, only completing pivots. OBJECTIVE:   Initial Evaluation  Date:  Treatment   Short Term/ Long Term   Goals   AM-PAC 6 Clicks 90/69     Was pt agreeable to Eval/treatment? Yes      Does pt have pain?  Denies      Bed Mobility  Rolling: NT  Supine to sit: Shell  Sit to supine: NT  Scooting: SBA to EOB  Independent    Transfers Sit to stand: Shell  Stand to sit: Shell  Stand pivot: [employer]  Independent    Ambulation    3 feet with [employer] Shell    >50 feet with [employer] Mod I    Stair negotiation: ascended and descended  NT  TBD      LE ROM: WFL    LE Strength:   knee ext: 4/5  ankle DF: 4/5    Balance:   Sitting static: Supervision  Sitting dynamic: SBA  Standing static: SBA [employer]  Standing dynamic: Shlel [employer]    -Pt is A & O x 3  -Sensation:  Pt denies numbness and tingling to extremities  -Edema:  unremarkable     Therapeutic Exercises:  Functional activity     Patient education  Pt educated on safety, sequencing of transfers, and role of PT    Patient response to education:   Pt verbalized understanding Pt demonstrated skill Pt requires further education in this area   Yes  Partial  Yes      ASSESSMENT:  Conditions Requiring Skilled Therapeutic Intervention:  []Decreased strength     []Decreased ROM  [x]Decreased functional mobility  [x]Decreased balance   [x]Decreased endurance   []Decreased posture  []Decreased sensation  []Decreased coordination   []Decreased vision  [x]Decreased safety awareness []Increased pain     Comments:  Pt received supine in bed and agreeable to PT session   Pt required light assist and guidance for all transfers this session. She is particular about attempting transfers without assistance but simultaneously reports how she cant do things without help and is concerned. Pivot completed to bedside commode with use of walker and assist for postural swaying/instability. Provided time to void and completed additional pivot transfer to bedside and from EOB to chair. Pt appears to be able to ambulate short distances based off SPT performance but is self limiting and states she cant without trying. Sitting in chair to end session   Pt with all needs met and call light in reach. Pt would benefit from continued PT POC to address functional deficits described above. Treatment:  Patient practiced and was instructed in the following treatment:     Therapeutic activity  o Patient education provided continuously throughout session for sequencing, safety maintenance, and improving any deficits found during the evaluation. o Bed mobility training - pt given verbal and tactile cues to facilitate proper sequencing and safety during rolling and supine>sit as well as provided with physical assistance to complete task    o STS and pivot transfer training - pt educated on proper hand and foot placement, safety and sequencing, and use of proper technique to safely complete sit<>stand and pivot transfers with hands on assistance to complete task safely. SPT completed x 3 reps various surfaces     Pt's/ family goals   1. None stated    Patient and or family understand(s) diagnosis, prognosis, and plan of care. yes    Prognosis is good for reaching above PT goals.     PHYSICAL THERAPY PLAN OF CARE:    PT POC is established based on physician order and patient diagnosis     Referring provider/PT Order:  05/17/22 0945   PT eval and treat Start: 05/17/22 0945, End: 05/17/22 0945, ONE TIME, Standing Count: 1 LINH Stovall MD    Diagnosis:  Heart failure [facility]) [I50.9]  Specific instructions for next treatment:  Progress transfers as able. Ambulate as able    Current Treatment Recommendations:   [] Strengthening to improve independence with functional mobility   [] ROM to improve independence with functional mobility   [x] Balance Training to improve static/dynamic balance and to reduce fall risk  [x] Endurance Training to improve activity tolerance during functional mobility   [x] Transfer Training to improve safety and independence with all functional transfers   [x] Gait Training to improve gait mechanics, endurance and asses need for appropriate assistive device  [] Stair Training in preparation for safe discharge home and/or into the community   [x] Positioning to prevent skin breakdown and contractures  [x] Safety and Education Training   [] Patient/Caregiver Education   [] HEP  [] Other     PT long term treatment goals are located in above grid    Frequency of treatments: 2-5x/week x 1-2 weeks. Time in  1115  Time out  1135    Total Treatment Time  10 minutes     Evaluation Time includes thorough review of current medical information, gathering information on past medical history/social history and prior level of function, completion of standardized testing/informal observation of tasks, assessment of data and education on plan of care and goals.     CPT codes:  [x] Low Complexity PT evaluation 13782  [] Moderate Complexity PT evaluation 32424  [] High Complexity PT evaluation 66649  [] PT Re-evaluation 83452  [] Gait training 15879 - minutes  [] Manual therapy 65191 - minutes  [x] Therapeutic activities 47758 10 minutes  [] Therapeutic exercises 59215 - minutes  [] Neuromuscular reeducation 58194 - minutes     Estephanie Dickey PT, DPT  [id number]

## 2022-05-17 NOTE — PLAN OF CARE
Problem: Chronic Conditions and Co-morbidities  Goal: Patient's chronic conditions and co-morbidity symptoms are monitored and maintained or improved  Outcome: Adequate for Discharge     Problem: Discharge Planning  Goal: Discharge to home or other facility with appropriate resources  Outcome: Adequate for Discharge     Problem: Safety - Adult  Goal: Free from fall injury  Outcome: Adequate for Discharge     Problem: ABCDS Injury Assessment  Goal: Absence of physical injury  Outcome: Adequate for Discharge     Problem: Skin/Tissue Integrity  Goal: Absence of new skin breakdown  Description: 1. Monitor for areas of redness and/or skin breakdown  2. Assess vascular access sites hourly  3. Every 4-6 hours minimum:  Change oxygen saturation probe site  4. Every 4-6 hours:  If on nasal continuous positive airway pressure, respiratory therapy assess nares and determine need for appliance change or resting period.   Outcome: Adequate for Discharge     Problem: Cardiovascular - Adult  Goal: Maintains optimal cardiac output and hemodynamic stability  Outcome: Adequate for Discharge  Goal: Absence of cardiac dysrhythmias or at baseline  Outcome: Adequate for Discharge

## 2022-05-17 NOTE — PROGRESS NOTES
Occupational Therapy  OCCUPATIONAL THERAPY INITIAL EVALUATION    ELI Henriquez [employer] [address] [address]      Date:2022                                                Patient Name: Larry Jimenez  MRN: [id number]  : 1950  Room: [address] [place] [address]    Evaluating OT: Judi Guerra OTR/L #[id number]     Referring Provider: Lali Abad MD  Specific Provider Orders/Date: OT eval and treat 22    Diagnosis: Heart failure ([facility] [address] [I50.9]   Pt admitted to hospital with with concerns for hallucinations, AF and RVR     Surgery / Procedure: cardioversion      Pertinent Medical History:  has a past medical history of Acute on chronic congestive heart failure ([address] Anxiety, Asthma, CAD (coronary artery disease), Cancer ([address] Chronic kidney disease, Depression, Diabetes mellitus ([facility] [address] H/O mammogram, Hx MRSA infection, Hyperlipidemia, Hypertension, Lateral epicondylitis, SERENA on CPAP, Parkinson's disease ([facility] [address] and Tubal ligation status.        Precautions:  Fall Risk, O2    Assessment of current deficits    [x] Functional mobility  [x]ADLs  [x] Strength               []Cognition    [x] Functional transfers   [x] IADLs         [x] Safety Awareness   [x]Endurance    [] Fine Coordination              [x] Balance      [] Vision/perception   []Sensation     []Gross Motor Coordination  [] ROM  [] Delirium                   [] Motor Control     OT PLAN OF CARE   OT POC based on physician orders, patient diagnosis and results of clinical assessment    Frequency/Duration 1-3 days/wk for 2 weeks PRN   Specific OT Treatment Interventions to include:   * Instruction/training on adapted ADL techniques and AE recommendations to increase functional independence within precautions       * Training on energy conservation strategies, correct breathing pattern and techniques to improve independence/tolerance for self-care routine  * Functional transfer/mobility training/DME recommendations for increased independence, safety, and fall prevention  * Patient/Family education to increase follow through with safety techniques and functional independence  * Recommendation of environmental modifications for increased safety with functional transfers/mobility and ADLs  * Cognitive retraining/development of therapeutic activities to improve problem solving, judgement, memory, and attention for increased safety/participation in ADL/IADL tasks  * Therapeutic exercise to improve motor endurance, ROM, and functional strength for ADLs/functional transfers  * Therapeutic activities to facilitate/challenge dynamic balance, stand tolerance for increased safety and independence with ADLs  * Therapeutic activities to facilitate gross/fine motor skills for increased independence with ADLs  * Neuro-muscular re-education: facilitation of righting/equilibrium reactions, midline orientation, scapular stability/mobility, normalization of muscle tone, and facilitation of volitional active controled movement    Recommended Adaptive Equipment:  TBD     Home Living: Pt lives lives alone in a 3rd floor apartment with elevator access   Bathroom setup: walk-in shower    Equipment owned: rollator    Prior Level of Function: mod I with ADLs , mod I with IADLs; ambulated with rollator; Pt admitted from rehab (was active with therapy)   Driving: no   Occupation: na    Pain Level: Pt denies pain this session    Cognition: A&O: 4/4; Follows 2 step directions   Memory:  good   Sequencing:  fair   Problem solving:  fair   Judgement/safety:  fair     Functional Assessment:  AM-PAC Daily Activity Raw Score: 16/24   Initial Eval Status  Date: 5/17/22 Treatment Status  Date: STGs = [facility]  Time frame: 10-14 days   Feeding Independent      Grooming Minimal Assist   Modified [place]    UB Dressing Minimal Assist   Modified [place]    LB Dressing Maximal Assist   Minimal Assist    Bathing Maximal Assist  Minimal Assist Toileting Maximal Assist     Toileting task on BSC  Minimal Assist    Bed Mobility  Supine to sit: Stand by Assist   Sit to supine: NT   Supine to sit: Supervision   Sit to supine: Supervision    Functional Transfers Minimal Assist   Supervision    Functional Mobility Minimal Assist     Ambulated short distance in room with w/w   Supervision    Balance Sitting:     Static:  SBA    Dynamic: min A  Standing: min A     Activity Tolerance F-  F+   Visual/  Perceptual wfl                    Hand Dominance right   Strength ROM Additional Info:    RUE   3+/5 wfl good  and wfl FMC/dexterity noted during ADL tasks     LUE 3+/5 wfl good  and wfl FMC/dexterity noted during ADL tasks     Hearing: wfl  Sensation:wfl  Tone: wfl  Edema:none noted     Comments: Upon arrival patient supine in bed and agreeable to OT Session. Therapist educated pt on role of OT. At end of session, patient seated in chair with call light and phone within reach, all lines and tubes intact. Overall patient demonstrated decreased independence and safety during completion of ADL/functional transfer/mobility tasks. Pt would benefit from continued skilled OT to increase safety and independence with completion of ADL/IADL tasks for functional independence and quality of life. Treatment: OT treatment provided this date includes: Facilitation of bed mobility, sitting balance (impacting ADLs; addressing posture, weight shifting, dynamic reaching), functional transfers (various surfaces: bed, BSC, chair), standing tolerance tasks (addressing posture, balance and activity tolerance; impacting ADLs and functional activity) and functional ambulation tasks with w/w (including to/ from [facility] and in preparation for item retrieval tasks; cuing on posture, w/w management and safety) - skilled cuing on sequencing, hand placement, posture, body mechanics, energy conservation techniques and safety.   Therapist facilitated self-care retraining: UB/LB self-care tasks (gown, socks), toileting task and grooming tasks while cuing on modified techniques, posture, safety and energy conservation techniques. Skilled monitoring of HR, O2 sats and pts response to treatment. Rehab Potential: Good for established goals     Patient / Family Goal: none stated      Patient and/or family were instructed on functional diagnosis, prognosis/goals and OT plan of care. Demonstrated fair understanding. Eval Complexity: Low    Time In: 1105  Time Out: 1130  Total Treatment Time: 10 minutes    Min Units   OT Eval Low 97165  x  1   OT Eval Medium 65188      OT Eval High 41730      OT Re-Eval [id number]       Therapeutic Ex 88258       Therapeutic Activities 37526  2     ADL/Self Care 56406  8  1   Orthotic Management 30601       Manual [id number]     Neuro Re-Ed 14287       Non-Billable Time          Evaluation Time additionally includes thorough review of current medical information, gathering information on past medical history/social history and prior level of function, interpretation of standardized testing/informal observation of tasks, assessment of data and development of plan of care and goals.           [facility] OTR/L #[id number]

## 2022-05-17 NOTE — PLAN OF CARE
Applied Zio AT monitor for 14 days. Serial number [id number]. Instructed patient to avoid showering in the first 24 hours. Press the button on the monitor when you feel a symptom and write it in your diary. Do not submerge in water. No lotion or powder near the patch. Please return the monitor in the box provided. Patient confused.

## 2022-05-17 NOTE — PROGRESS NOTES
Inpatient Cardiology Progress note     PATIENT IS BEING FOLLOWED FOR: NSTEMI / Afib RVR, status post successful electrical cardioversion yesterday. Sandhya Armstrong is a 66 y.o. female who follows with Dr Lenka Gleason. SUBJECTIVE: Denies CP, SOB, palpations, dizziness, or HA. OBJECTIVE: SB 47 on tele. Pt euvolemic and in no distress. ROS:  Consist: Denies fevers, chills or night sweats  Heart: Denies chest pain, palpitations, lightheadedness, dizziness or syncope  Lungs: Denies SOB, cough, wheezing, orthopnea or PND  GI: Denies abdominal pain, vomiting or diarrhea    PHYSICAL EXAM:   BP (!) 112/59   Pulse 53   Temp 98 °F (36.7 °C) (Oral)   Resp 20   Ht 5' (1.524 m)   Wt 227 lb 1.6 oz (103 kg)   SpO2 100%   BMI 44.35 kg/m²      CONST:  Well developed, 66 female who appears stated age. Awake, alert, cooperative, no apparent distress  HEENT:   Head- Normocephalic, atraumatic   Eyes- Conjunctivae pink, anicteric  Throat- Oral mucosa pink and moist  Neck-  No stridor, trachea midline, no jugular venous distention. CHEST: Chest symmetrical and non-tender to palpation. RESPIRATORY: Lung sounds clear throughout fields bilaterally. No wheeze or rhonchi noted. CARDIOVASCULAR:     No carotid bruit noted bilaterally   Heart Ausculation- Regular rate and rhythm, no murmur. No s3, s4 or rub   PV: No lower extremity edema. No varicosities. ABDOMEN: Soft, non-tender to light palpation. Bowel sounds present. MS: Good muscle strength and tone. No atrophy or abnormal movements. : Deferred  SKIN: Warm and dry no statis dermatitis or ulcers   NEURO / PSYCH: Oriented to person, place and time. Speech clear and appropriate. Follows all commands.  Pleasant affect     Weight:   Wt Readings from Last 3 Encounters:   05/17/22 227 lb 1.6 oz (103 kg)   04/18/22 244 lb 6.4 oz (110.9 kg)   03/26/22 220 lb (99.8 kg)     Current Inpatient Medications:   aspirin  81 mg Oral Daily    sodium chloride flush  5-40 mL IntraVENous 2 times per day    budesonide  0.5 mg Nebulization BID    And    Arformoterol Tartrate  15 mcg Nebulization BID    venlafaxine  75 mg Oral Daily    melatonin  3 mg Oral Daily    apixaban  5 mg Oral BID    atorvastatin  20 mg Oral Daily    busPIRone  5 mg Oral TID    carbidopa-levodopa  2 tablet Oral TID    lamoTRIgine  200 mg Oral Daily    montelukast  10 mg Oral Nightly    pantoprazole  40 mg Oral QAM AC    rOPINIRole  1 mg Oral TID    sodium chloride flush  5-40 mL IntraVENous 2 times per day    insulin glargine  0.25 Units/kg SubCUTAneous Nightly    insulin lispro  0-6 Units SubCUTAneous TID WC    insulin lispro  0-3 Units SubCUTAneous Nightly       IV Infusions (if any):   sodium chloride      sodium chloride      dextrose         DIAGNOSTIC/ LABORATORY DATA:  Labs:   CBC:   Recent Labs     05/15/22  1634   WBC 8.7   HGB 11.1*   HCT 37.6        BMP:   Recent Labs     05/15/22  0621 05/16/22  0501    138   K 4.3 4.4   CO2 37* 38*   BUN 23 22   CREATININE 1.4* 1.1*   LABGLOM 37 49   CALCIUM 8.9 9.0     Mag:   Recent Labs     05/15/22  0621 05/16/22  0501   MG 2.1 2.0     HgA1c:   Lab Results   Component Value Date    LABA1C 6.5 (H) 05/12/2022     FASTING LIPID PANEL:  Lab Results   Component Value Date    CHOL 95 05/12/2022    HDL 38 05/12/2022    LDLCALC 38 05/12/2022    TRIG 93 05/12/2022       CXR:   Impression   No acute process.             Telemetry: SB 47    Recent Cardiac Test Results:   Cardiac catheterization 1/11/2022: CONCLUSIONS:  Coronary artery disease: Left main.  No significant disease.  LAD. Alexis Edelson calcified proximal and mid vessel with 50% mid vessel stenosis.  60% proximal stenosis of a moderate size first diagonal branch.  LCX.  50% ostial narrowing of the AV groove branch in the mid vessel which is a bifurcation lesion with a large marginal branch which itself did not appear to have any significant disease.  The AV groove branch of the left circumflex continues to provide a posterior descending artery branch and the posterolateral branch (codominant vessel). RCA.  Codominant vessel with no significant angiographic disease. Normal left ventricular size with hyperdynamic left ventricular systolic function with an estimated ejection fraction of 75%. Systemic hypertension.  Elevated left ventricular end-diastolic PYJAHJAP (14)  Echocardiogram 1/11/2022:  Left ventricle is normal in size.  Moderate concentric left ventricular hypertrophy. No regional wall motion abnormalities seen.  Ejection fraction is visually estimated at 70+/-5%.  There is doppler evidence of stage I diastolic dysfunction.  Mildly dilated right ventricle.  Right ventricle global systolic function is normal ( TAPSE = 1.8 ). Normal size atria.  No significant valvular abnormalities noted. PAT (4/18/2022): Normal left ventricle size and systolic function. Ejection fraction is visually estimated at 60%. No regional wall motion abnormalities seen. Moderately dilated left atrium. No evidence of thrombus within left atrium or appendage. Agitated saline injected for shunt evaluation. No evidence of patent foramen ovale on bubble study. NAIF emptying velocity 18 cm/sec. Normal right ventricular size and function. Mild mitral regurgitation is present. No hemodynamically significant aortic stenosis is present. Mild tricuspid regurgitation. RVSP is 32 mmHg. Normal estimated PA systolic pressure. No evidence for hemodynamically significant pericardial effusion. Assessment:  AF with RVR, recurrent. Known history of PAF s/p DCCV in 4/2022. Successful DCCV 5/16/2022 with x1 200 joules. Currently bradycardic in the 40's. Acute HFpEF, resolved, clinically euvolemic. Acute hypoxic respiratory failure, on 4 L NC. Not on O2 at home. SHANTANU on CKD, resolved.  Cr. Today 1.1   Moderate CAD on [facility] 1/2022, clinically stable   SERENA, not compliant with CPAP  Asthma  Continue tobacco abuse  Morbid obesity HTN, controlled  HLD, on Lipitor  T2DM, insulin requiring   Chronic iron deficiency anemia, Hgb 11.1 5/15/2022  Hx of Gastric ulcers in 2013  Anxiety/Depression     Plan:  Continue Eliquis  Continue ASA / statin   Wean oxygen to room air as tolerated. Consider EP consult if significant pauses occur, AV block or recurrence of atrial fibrillation. Optimize GDMT: no beta blocker for now due to bradycardia. May consider SGLT-I as outpatient. Compliance with CPAP strongly encouraged to patient. Aggressive risk modification including weight loss, diet, exercise, and smoking cessation. ZIO AT at discharge   Rest as per primary service and other consultants.    May be discharged from cardiology stand point with follow up with Dr Duane Wheeler in the office 4-6 weeks  Cardiology will sign off, please call if needed       Electronically signed by Maegan Wilcox on 5/17/2022 at 12:44 PM     I agree with the above assessment and plan made in collaboration with Maegan Wilcox.

## 2022-05-17 NOTE — DISCHARGE SUMMARY
Patient: Randee Buck Sex: female DOA: 5/11/2022   YOB: 1950 Age: 66 y.o. LOS:  LOS: 6 days    Admit Date: 5/11/2022   Discharge Date: 05/17/22   Primary Care Physician: No primary care provider on file. Discharge to: [facility]  Readmission risk: Moderate Risk    Hospital admission:  Per HPI:\" Ms. Randee Buck, a 66y.o. year old female  who  has a past medical history of Acute on chronic congestive heart failure ([address] Anxiety, Asthma, CAD (coronary artery disease), Cancer ([address] Chronic kidney disease, Depression, Diabetes mellitus ([address] H/O mammogram, Hx MRSA infection, Hyperlipidemia, Hypertension, Lateral epicondylitis, SERENA on CPAP, Parkinson's disease ([address] and Tubal ligation status.         Presented to the emergency department with concerns about hallucinations. Family reports she has been hallucinating. In the emergency department patient is not hallucinating. Vital signs reveal the patient to be tachycardic with a rate in 130s. EKG showed atrial fibrillation with RVR. Patient has a history atrial fibrillation and is anticoagulated on Eliquis. The remainder of her vital signs are within normal limits and stable. Laboratory studies demonstrate CO2 30, BUN 24, creatinine 1.3, glucose 165, proBNP 7325, troponin of 21, hemoglobin 9.3. Patient was given a dose of Lasix and medicine was consulted for admission for treatment of fluid overload/heart failure exacerbation.  [facility] POINT OF [facility] Course and discharge assessment with plan:     AFib with RVR, recurrent s/p successful DCCV 5/16/2022  Known history of PAF s/p DCCV in 4/2022  Event recorder ordered on discharge  Consider EP consult if recurrent of syncopal events    Acute hypoxia 2/2 Acute HFpEF, resolved  clinically euvolemic     SHANTANU (resolved) on CKD III     Moderate CAD on [facility] 1/2022, stable     SERENA, not compliant with CPAP    Asthma & Continue tobacco abuse  Cont home meds    Morbid obesity, BMI 44    HTN, controlled    HLD  on Lipitor    DM2  A1c 6.5  Avoid tight BG control as is of no mortality benefit  Cont home insulin regimen karla adjust as needed    Chronic iron deficiency anemia    Hx of Gastric ulcers in 2013    Anxiety/Depression    cont adjusted meds per psychiatry    Parkinson's disease    Long term Nursing home resident    Discharge plan of care was discussed with: pt, her son/Dayne RN, CM    Discharge Diagnoses:   Patient Active Problem List   Diagnosis    Depression with anxiety    Osteoporosis    Asthma    Hyperlipidemia    Mitral regurgitation    Obstructive sleep apnea syndrome    Psoriasis    Diabetes mellitus [facility])    Parkinson's disease ([facility])    Primary hypertension    Microalbuminuria    Morbid obesity ([facility])    RLS (restless legs syndrome)    Generalized weakness    Inability to walk    Hypothyroidism    Chest pain    Acute asthma exacerbation    Asthma exacerbation, mild    New onset a-fib ([facility])    Atrial fibrillation with RVR (HCC)    Acute on chronic congestive heart failure (HCC)    Coronary artery disease involving native coronary artery of native heart without angina pectoris    Dysphagia    Hepatosplenomegaly    Heart failure (HCC)    Acute diastolic (congestive) heart failure (HCC)    Nonrheumatic tricuspid valve regurgitation    Tobacco abuse    Recurrent syncope       Discharge Medications:   Current Discharge Medication List           Details   aspirin EC 81 MG EC tablet Take 1 tablet by mouth daily  Qty: 30 tablet, Refills: 0      melatonin 3 MG TABS tablet Take 1 tablet by mouth daily  Qty: 1 tablet, Refills: 3              Details   ALPRAZolam (XANAX) 0.5 MG tablet Take 1 tablet by mouth daily as needed for Anxiety for up to 30 days.   Qty: 3 tablet, Refills: 0    Associated Diagnoses: Parkinson's disease ([facility])      venlafaxine (EFFEXOR XR) 75 MG extended release capsule Take 1 capsule by mouth daily  Qty: 30 capsule, Refills: 3      insulin glargine (LANTUS) 100 UNIT/ML injection vial Inject 10 Units into the skin nightly  Qty: 10 mL, Refills: 3              Details   apixaban (ELIQUIS) 5 MG TABS tablet Take 1 tablet by mouth 2 times daily  Qty: 60 tablet, Refills: 0      lidocaine 4 % external patch Place 1 patch onto the skin daily Leave on for 12 hrs. Take off for 12 hrs. Qty: 5 patch, Refills: 0      budesonide-formoterol (SYMBICORT) 160-4.5 MCG/ACT AERO Inhale 2 puffs into the lungs 2 times daily  Qty: 10.2 g, Refills: 0      ipratropium-albuterol (DUONEB) 0.5-2.5 (3) MG/3ML SOLN nebulizer solution Inhale 3 mLs into the lungs every 4 hours as needed for Shortness of Breath  Qty: 360 mL, Refills: 0      albuterol sulfate HFA (PROVENTIL HFA) 108 (90 Base) MCG/ACT inhaler Inhale 2 puffs into the lungs every 6 hours as needed for Wheezing  Qty: 18 g, Refills: 0      rOPINIRole (REQUIP) 1 MG tablet Take 1 tablet by mouth 3 times daily  Qty: 270 tablet, Refills: 3      carbidopa-levodopa (SINEMET CR)  MG per extended release tablet Take 1 tablet by mouth 3 times daily  Qty: 270 tablet, Refills: 3    Associated Diagnoses: Parkinson disease (HCC)      nitroGLYCERIN (NITROSTAT) 0.4 MG SL tablet up to max of 3 total doses. If no relief after 1 dose, call 911. Qty: 25 tablet, Refills: 0      busPIRone (BUSPAR) 5 MG tablet Take 1 tablet by mouth 3 times daily  Qty: 90 tablet, Refills: 0      montelukast (SINGULAIR) 10 MG tablet Take 10 mg by mouth nightly      Cholecalciferol (VITAMIN D) 25 MCG TABS Take 1 tablet by mouth daily  Qty: 60 tablet    Comments: Labeling may look different. 25 mcg=1000 Units. Please double check dosages. omeprazole (PRILOSEC) 40 MG delayed release capsule Take 40 mg by mouth daily       blood glucose test strips (ACCU-CHEK RIGOBERTO PLUS) strip 1 each by In Vitro route 2 times daily Indications: DISP ACCU-CHEK As needed.       lamoTRIgine (LAMICTAL) 200 MG tablet Take 200 mg by mouth daily   Refills: 0      atorvastatin (LIPITOR) 20 MG tablet Take 1 tablet by mouth daily  Qty: 90 tablet, Refills: 3    Comments: **Patient requests 90 days supply**      insulin aspart (NOVOLOG FLEXPEN) 100 UNIT/ML injection pen INJECT 0-10 UNITS INTO THE SKIN THREE TIMES DAILY(BEFORE MEALS) SLIDING SCALE (MAX DAILY 30 UNITS)  Qty: 5 pen, Refills: 3              Follow-up: PCP     Discharge Condition: stable    Discharge instruction:   Patient instructed to return to the emergency department if: Chest pain, shortness of breath, altered mental status, fever, chills, nausea, vomiting, abdominal pain or any other concerns. Activity: progressive as tolerated. Diet: cardiac diet and diabetic diet     Wound Care: none needed     Consults: cardiology and psychiatry       Discharge Physical Exam:   BP (!) 112/59   Pulse 53   Temp 98 °F (36.7 °C) (Oral)   Resp 20   Ht 5' (1.524 m)   Wt 227 lb 1.6 oz (103 kg)   SpO2 100%   BMI 44.35 kg/m²     General appearance: Alert, cooperative, no distress, appears stated age   Head: Normocephalic, without obvious abnormality, atraumatic   Neck: Supple, no lymphadenopathy or thyromegaly, trachea midline   Lungs: Clear to auscultation bilaterally, no wheezing or crackles   Heart: Regular rate and rhythm, S1, S2 normal   Abdomen: Soft, non-tender and not-distended.  Bowel sounds normal.   Extremities: Extremities normal, atraumatic, no cyanosis or edema   Skin: Skin color, texture, turgor normal. No rashes or lesions   Neurologic: AAOx3 CN I-XII intact, normal muscle tone and power, normal sensation       Ken Townsend MD   05/17/22 12:57 PM    Hospital Medicine   Time Spent: 55 min

## 2022-05-17 NOTE — PLAN OF CARE
Applied Zio AT monitor for 14 days. Serial number [id number]. Instructed patient to avoid showering in the first 24 hours. Press the button on the monitor when you feel a symptom and write it in your diary. Do not submerge in water. No lotion or powder near the patch. Please return the monitor in the box provided. Patient verbalized understanding.

## 2022-06-24 ENCOUNTER — TELEPHONE (OUTPATIENT)
Dept: CARDIOLOGY CLINIC | Age: 72
End: 2022-06-24

## 2022-07-05 ENCOUNTER — APPOINTMENT (OUTPATIENT)
Dept: CT IMAGING | Age: 72
DRG: 870 | End: 2022-07-05
Payer: MEDICARE

## 2022-07-05 ENCOUNTER — APPOINTMENT (OUTPATIENT)
Dept: GENERAL RADIOLOGY | Age: 72
DRG: 870 | End: 2022-07-05
Payer: MEDICARE

## 2022-07-05 ENCOUNTER — HOSPITAL ENCOUNTER (INPATIENT)
Age: 72
LOS: 22 days | Discharge: NURSING, REHAB OR LONG-TERM CARE | DRG: 870 | End: 2022-07-27
Attending: EMERGENCY MEDICINE | Admitting: INTERNAL MEDICINE
Payer: MEDICARE

## 2022-07-05 DIAGNOSIS — K85.90 ACUTE PANCREATITIS, UNSPECIFIED COMPLICATION STATUS, UNSPECIFIED PANCREATITIS TYPE: ICD-10-CM

## 2022-07-05 DIAGNOSIS — R57.9 SHOCK (HCC): ICD-10-CM

## 2022-07-05 DIAGNOSIS — R41.82 ALTERED MENTAL STATUS, UNSPECIFIED ALTERED MENTAL STATUS TYPE: ICD-10-CM

## 2022-07-05 DIAGNOSIS — N19 UREMIA: ICD-10-CM

## 2022-07-05 DIAGNOSIS — T46.0X1A POISONING BY DIGOXIN, ACCIDENTAL OR UNINTENTIONAL, INITIAL ENCOUNTER: ICD-10-CM

## 2022-07-05 DIAGNOSIS — N17.9 AKI (ACUTE KIDNEY INJURY) (HCC): Primary | ICD-10-CM

## 2022-07-05 LAB
ALBUMIN SERPL-MCNC: 3.6 G/DL (ref 3.5–5.2)
ALP BLD-CCNC: 81 U/L (ref 35–104)
ALT SERPL-CCNC: 9 U/L (ref 0–32)
ANION GAP SERPL CALCULATED.3IONS-SCNC: 15 MMOL/L (ref 7–16)
APTT: 42.9 SEC (ref 24.5–35.1)
AST SERPL-CCNC: 15 U/L (ref 0–31)
B.E.: -6.6 MMOL/L (ref -3–3)
BACTERIA: ABNORMAL /HPF
BASOPHILS ABSOLUTE: 0.05 E9/L (ref 0–0.2)
BASOPHILS RELATIVE PERCENT: 0.8 % (ref 0–2)
BILIRUB SERPL-MCNC: 0.5 MG/DL (ref 0–1.2)
BILIRUBIN URINE: NEGATIVE
BLOOD, URINE: NEGATIVE
BUN BLDV-MCNC: 95 MG/DL (ref 6–23)
CALCIUM SERPL-MCNC: 7.7 MG/DL (ref 8.6–10.2)
CHLORIDE BLD-SCNC: 92 MMOL/L (ref 98–107)
CLARITY: CLEAR
CO2: 26 MMOL/L (ref 22–29)
COHB: 0.8 % (ref 0–1.5)
COLOR: YELLOW
CREAT SERPL-MCNC: 3.3 MG/DL (ref 0.5–1)
CRITICAL: ABNORMAL
DATE ANALYZED: ABNORMAL
DATE OF COLLECTION: ABNORMAL
DIGOXIN LEVEL: 3.7 NG/ML (ref 0.8–2)
EKG ATRIAL RATE: 72 BPM
EKG Q-T INTERVAL: 384 MS
EKG QRS DURATION: 94 MS
EKG QTC CALCULATION (BAZETT): 417 MS
EKG R AXIS: 120 DEGREES
EKG T AXIS: -159 DEGREES
EKG VENTRICULAR RATE: 71 BPM
EOSINOPHILS ABSOLUTE: 0.07 E9/L (ref 0.05–0.5)
EOSINOPHILS RELATIVE PERCENT: 1.1 % (ref 0–6)
EPITHELIAL CELLS, UA: ABNORMAL /HPF
GFR AFRICAN AMERICAN: 17
GFR NON-AFRICAN AMERICAN: 14 ML/MIN/1.73
GLUCOSE BLD-MCNC: 89 MG/DL (ref 74–99)
GLUCOSE URINE: NEGATIVE MG/DL
HCO3: 24.1 MMOL/L (ref 22–26)
HCT VFR BLD CALC: 34.5 % (ref 34–48)
HEMOGLOBIN: 10.2 G/DL (ref 11.5–15.5)
HHB: 1 % (ref 0–5)
HYALINE CASTS: ABNORMAL /LPF (ref 0–2)
IMMATURE GRANULOCYTES #: 0.06 E9/L
IMMATURE GRANULOCYTES %: 0.9 % (ref 0–5)
INR BLD: 3.1
KETONES, URINE: NEGATIVE MG/DL
LAB: ABNORMAL
LACTIC ACID, SEPSIS: 2.1 MMOL/L (ref 0.5–1.9)
LACTIC ACID, SEPSIS: 2.8 MMOL/L (ref 0.5–1.9)
LEUKOCYTE ESTERASE, URINE: NEGATIVE
LIPASE: 148 U/L (ref 13–60)
LYMPHOCYTES ABSOLUTE: 1.07 E9/L (ref 1.5–4)
LYMPHOCYTES RELATIVE PERCENT: 16.4 % (ref 20–42)
Lab: ABNORMAL
MCH RBC QN AUTO: 25.8 PG (ref 26–35)
MCHC RBC AUTO-ENTMCNC: 29.6 % (ref 32–34.5)
MCV RBC AUTO: 87.1 FL (ref 80–99.9)
METHB: 0.4 % (ref 0–1.5)
MODE: ABNORMAL
MONOCYTES ABSOLUTE: 0.17 E9/L (ref 0.1–0.95)
MONOCYTES RELATIVE PERCENT: 2.6 % (ref 2–12)
NEUTROPHILS ABSOLUTE: 5.12 E9/L (ref 1.8–7.3)
NEUTROPHILS RELATIVE PERCENT: 78.2 % (ref 43–80)
NITRITE, URINE: NEGATIVE
O2 CONTENT: 16.7 ML/DL
O2 SATURATION: 99 % (ref 92–98.5)
O2HB: 97.8 % (ref 94–97)
OPERATOR ID: 2577
PATIENT TEMP: 37 C
PCO2: 77.8 MMHG (ref 35–45)
PDW BLD-RTO: 15.8 FL (ref 11.5–15)
PH BLOOD GAS: 7.11 (ref 7.35–7.45)
PH UA: 5.5 (ref 5–9)
PLATELET # BLD: 83 E9/L (ref 130–450)
PLATELET CONFIRMATION: NORMAL
PMV BLD AUTO: 11.3 FL (ref 7–12)
PO2: 174.6 MMHG (ref 75–100)
POTASSIUM REFLEX MAGNESIUM: 5.5 MMOL/L (ref 3.5–5)
PROTEIN UA: 30 MG/DL
PROTHROMBIN TIME: 33.7 SEC (ref 9.3–12.4)
RBC # BLD: 3.96 E12/L (ref 3.5–5.5)
RBC UA: ABNORMAL /HPF (ref 0–2)
SODIUM BLD-SCNC: 133 MMOL/L (ref 132–146)
SOURCE, BLOOD GAS: ABNORMAL
SPECIFIC GRAVITY UA: 1.02 (ref 1–1.03)
THB: 11.9 G/DL (ref 11.5–16.5)
TIME ANALYZED: 2150
TOTAL PROTEIN: 6.2 G/DL (ref 6.4–8.3)
TROPONIN, HIGH SENSITIVITY: 53 NG/L (ref 0–9)
TROPONIN, HIGH SENSITIVITY: 57 NG/L (ref 0–9)
UROBILINOGEN, URINE: 0.2 E.U./DL
VALPROIC ACID LEVEL: 68 MCG/ML (ref 50–100)
WBC # BLD: 6.5 E9/L (ref 4.5–11.5)
WBC UA: ABNORMAL /HPF (ref 0–5)

## 2022-07-05 PROCEDURE — 87186 SC STD MICRODIL/AGAR DIL: CPT

## 2022-07-05 PROCEDURE — 82805 BLOOD GASES W/O2 SATURATION: CPT

## 2022-07-05 PROCEDURE — 96365 THER/PROPH/DIAG IV INF INIT: CPT

## 2022-07-05 PROCEDURE — 80164 ASSAY DIPROPYLACETIC ACD TOT: CPT

## 2022-07-05 PROCEDURE — 31500 INSERT EMERGENCY AIRWAY: CPT

## 2022-07-05 PROCEDURE — 70450 CT HEAD/BRAIN W/O DYE: CPT

## 2022-07-05 PROCEDURE — 71045 X-RAY EXAM CHEST 1 VIEW: CPT

## 2022-07-05 PROCEDURE — 5A1955Z RESPIRATORY VENTILATION, GREATER THAN 96 CONSECUTIVE HOURS: ICD-10-PCS | Performed by: INTERNAL MEDICINE

## 2022-07-05 PROCEDURE — 87040 BLOOD CULTURE FOR BACTERIA: CPT

## 2022-07-05 PROCEDURE — 87070 CULTURE OTHR SPECIMN AEROBIC: CPT

## 2022-07-05 PROCEDURE — 74176 CT ABD & PELVIS W/O CONTRAST: CPT

## 2022-07-05 PROCEDURE — 96375 TX/PRO/DX INJ NEW DRUG ADDON: CPT

## 2022-07-05 PROCEDURE — 85025 COMPLETE CBC W/AUTO DIFF WBC: CPT

## 2022-07-05 PROCEDURE — 0BH18EZ INSERTION OF ENDOTRACHEAL AIRWAY INTO TRACHEA, VIA NATURAL OR ARTIFICIAL OPENING ENDOSCOPIC: ICD-10-PCS | Performed by: INTERNAL MEDICINE

## 2022-07-05 PROCEDURE — 94002 VENT MGMT INPAT INIT DAY: CPT

## 2022-07-05 PROCEDURE — 87088 URINE BACTERIA CULTURE: CPT

## 2022-07-05 PROCEDURE — 74018 RADEX ABDOMEN 1 VIEW: CPT

## 2022-07-05 PROCEDURE — 84484 ASSAY OF TROPONIN QUANT: CPT

## 2022-07-05 PROCEDURE — 81001 URINALYSIS AUTO W/SCOPE: CPT

## 2022-07-05 PROCEDURE — 71250 CT THORAX DX C-: CPT

## 2022-07-05 PROCEDURE — 83605 ASSAY OF LACTIC ACID: CPT

## 2022-07-05 PROCEDURE — 2000000000 HC ICU R&B

## 2022-07-05 PROCEDURE — 80162 ASSAY OF DIGOXIN TOTAL: CPT

## 2022-07-05 PROCEDURE — 6360000002 HC RX W HCPCS: Performed by: EMERGENCY MEDICINE

## 2022-07-05 PROCEDURE — 99285 EMERGENCY DEPT VISIT HI MDM: CPT

## 2022-07-05 PROCEDURE — 85610 PROTHROMBIN TIME: CPT

## 2022-07-05 PROCEDURE — 83690 ASSAY OF LIPASE: CPT

## 2022-07-05 PROCEDURE — 2580000003 HC RX 258: Performed by: STUDENT IN AN ORGANIZED HEALTH CARE EDUCATION/TRAINING PROGRAM

## 2022-07-05 PROCEDURE — 96361 HYDRATE IV INFUSION ADD-ON: CPT

## 2022-07-05 PROCEDURE — 2500000003 HC RX 250 WO HCPCS: Performed by: STUDENT IN AN ORGANIZED HEALTH CARE EDUCATION/TRAINING PROGRAM

## 2022-07-05 PROCEDURE — 80053 COMPREHEN METABOLIC PANEL: CPT

## 2022-07-05 PROCEDURE — 2580000003 HC RX 258: Performed by: EMERGENCY MEDICINE

## 2022-07-05 PROCEDURE — 93005 ELECTROCARDIOGRAM TRACING: CPT | Performed by: STUDENT IN AN ORGANIZED HEALTH CARE EDUCATION/TRAINING PROGRAM

## 2022-07-05 PROCEDURE — 2500000003 HC RX 250 WO HCPCS: Performed by: EMERGENCY MEDICINE

## 2022-07-05 PROCEDURE — 85730 THROMBOPLASTIN TIME PARTIAL: CPT

## 2022-07-05 PROCEDURE — 87077 CULTURE AEROBIC IDENTIFY: CPT

## 2022-07-05 PROCEDURE — 6360000002 HC RX W HCPCS: Performed by: STUDENT IN AN ORGANIZED HEALTH CARE EDUCATION/TRAINING PROGRAM

## 2022-07-05 PROCEDURE — 96368 THER/DIAG CONCURRENT INF: CPT

## 2022-07-05 RX ORDER — ONDANSETRON 4 MG/1
4 TABLET, ORALLY DISINTEGRATING ORAL EVERY 8 HOURS PRN
Status: DISCONTINUED | OUTPATIENT
Start: 2022-07-05 | End: 2022-07-06

## 2022-07-05 RX ORDER — ASPIRIN 81 MG/1
81 TABLET ORAL DAILY
Status: DISCONTINUED | OUTPATIENT
Start: 2022-07-06 | End: 2022-07-06

## 2022-07-05 RX ORDER — POLYETHYLENE GLYCOL 3350 17 G/17G
17 POWDER, FOR SOLUTION ORAL DAILY PRN
Status: DISCONTINUED | OUTPATIENT
Start: 2022-07-05 | End: 2022-07-06

## 2022-07-05 RX ORDER — ACETAMINOPHEN 325 MG/1
650 TABLET ORAL EVERY 6 HOURS PRN
Status: DISCONTINUED | OUTPATIENT
Start: 2022-07-05 | End: 2022-07-27 | Stop reason: HOSPADM

## 2022-07-05 RX ORDER — SODIUM CHLORIDE 0.9 % (FLUSH) 0.9 %
5-40 SYRINGE (ML) INJECTION EVERY 12 HOURS SCHEDULED
Status: DISCONTINUED | OUTPATIENT
Start: 2022-07-05 | End: 2022-07-27 | Stop reason: HOSPADM

## 2022-07-05 RX ORDER — 0.9 % SODIUM CHLORIDE 0.9 %
30 INTRAVENOUS SOLUTION INTRAVENOUS ONCE
Status: COMPLETED | OUTPATIENT
Start: 2022-07-05 | End: 2022-07-05

## 2022-07-05 RX ORDER — ACETAMINOPHEN 650 MG/1
650 SUPPOSITORY RECTAL EVERY 6 HOURS PRN
Status: DISCONTINUED | OUTPATIENT
Start: 2022-07-05 | End: 2022-07-27 | Stop reason: HOSPADM

## 2022-07-05 RX ORDER — SODIUM CHLORIDE 9 MG/ML
INJECTION, SOLUTION INTRAVENOUS PRN
Status: DISCONTINUED | OUTPATIENT
Start: 2022-07-05 | End: 2022-07-27 | Stop reason: HOSPADM

## 2022-07-05 RX ORDER — SODIUM CHLORIDE 0.9 % (FLUSH) 0.9 %
5-40 SYRINGE (ML) INJECTION PRN
Status: DISCONTINUED | OUTPATIENT
Start: 2022-07-05 | End: 2022-07-06

## 2022-07-05 RX ORDER — SODIUM CHLORIDE 0.9 % (FLUSH) 0.9 %
5-40 SYRINGE (ML) INJECTION EVERY 12 HOURS SCHEDULED
Status: DISCONTINUED | OUTPATIENT
Start: 2022-07-05 | End: 2022-07-06

## 2022-07-05 RX ORDER — SODIUM CHLORIDE 0.9 % (FLUSH) 0.9 %
5-40 SYRINGE (ML) INJECTION PRN
Status: DISCONTINUED | OUTPATIENT
Start: 2022-07-05 | End: 2022-07-12

## 2022-07-05 RX ORDER — ONDANSETRON 2 MG/ML
4 INJECTION INTRAMUSCULAR; INTRAVENOUS EVERY 6 HOURS PRN
Status: DISCONTINUED | OUTPATIENT
Start: 2022-07-05 | End: 2022-07-06

## 2022-07-05 RX ORDER — ROCURONIUM BROMIDE 10 MG/ML
100 INJECTION, SOLUTION INTRAVENOUS ONCE
Status: COMPLETED | OUTPATIENT
Start: 2022-07-05 | End: 2022-07-05

## 2022-07-05 RX ORDER — ETOMIDATE 2 MG/ML
10 INJECTION INTRAVENOUS ONCE
Status: COMPLETED | OUTPATIENT
Start: 2022-07-05 | End: 2022-07-05

## 2022-07-05 RX ORDER — ATORVASTATIN CALCIUM 20 MG/1
20 TABLET, FILM COATED ORAL DAILY
Status: DISCONTINUED | OUTPATIENT
Start: 2022-07-06 | End: 2022-07-27 | Stop reason: HOSPADM

## 2022-07-05 RX ORDER — SODIUM CHLORIDE 9 MG/ML
INJECTION, SOLUTION INTRAVENOUS PRN
Status: DISCONTINUED | OUTPATIENT
Start: 2022-07-05 | End: 2022-07-06

## 2022-07-05 RX ORDER — DEXTROSE MONOHYDRATE 50 MG/ML
100 INJECTION, SOLUTION INTRAVENOUS PRN
Status: DISCONTINUED | OUTPATIENT
Start: 2022-07-05 | End: 2022-07-27 | Stop reason: HOSPADM

## 2022-07-05 RX ADMIN — VANCOMYCIN HYDROCHLORIDE 2000 MG: 10 INJECTION, POWDER, LYOPHILIZED, FOR SOLUTION INTRAVENOUS at 20:10

## 2022-07-05 RX ADMIN — OVINE DIGOXIN IMMUNE FAB 160 MG: 40 INJECTION, POWDER, FOR SOLUTION INTRAVENOUS at 17:01

## 2022-07-05 RX ADMIN — HYDROCORTISONE SODIUM SUCCINATE 100 MG: 100 INJECTION, POWDER, FOR SOLUTION INTRAMUSCULAR; INTRAVENOUS at 17:20

## 2022-07-05 RX ADMIN — SODIUM CHLORIDE 3129 ML: 9 INJECTION, SOLUTION INTRAVENOUS at 12:58

## 2022-07-05 RX ADMIN — Medication 10 MCG/MIN: at 16:47

## 2022-07-05 RX ADMIN — ETOMIDATE 10 MG: 2 INJECTION, SOLUTION INTRAVENOUS at 22:16

## 2022-07-05 RX ADMIN — ROCURONIUM BROMIDE 100 MG: 10 INJECTION, SOLUTION INTRAVENOUS at 22:17

## 2022-07-05 RX ADMIN — Medication 10 ML: at 22:50

## 2022-07-05 RX ADMIN — PIPERACILLIN AND TAZOBACTAM 4500 MG: 4; .5 INJECTION, POWDER, FOR SOLUTION INTRAVENOUS at 16:59

## 2022-07-05 ASSESSMENT — PULMONARY FUNCTION TESTS
PIF_VALUE: 36
PIF_VALUE: 31

## 2022-07-05 ASSESSMENT — PAIN - FUNCTIONAL ASSESSMENT: PAIN_FUNCTIONAL_ASSESSMENT: NONE - DENIES PAIN

## 2022-07-05 NOTE — LETTER
[address] Medicaid  CERTIFICATION OF NECESSITY  FOR TRANSPORTATION   BY WHEELCHAIR VAN     Individual Information   1. Name: Chase Moncada 2. [place] Medicaid Billing Number:    3. Address: [address]  [address]      Transportation Provider Information   4. Provider Name:    5. [place] Medicaid Provider Number:  National Provider Identifier (NPI):      Certification  7. Criteria:  By signing this document, the practitioner certifies that two statements are true:  A. This individual must be accompanied by a mobility-related assistive device from the point of pick-up to the point of drop-off. B. Transport of this individual by standard passenger vehicle or common carrier is precluded or contraindicated. 8. Period Beginning Date: 07/19/22   9. Length  [] Not more than 10 day(s)  [] One Year     Additional Information Relevant to Certification   10. Comments or Explanations, If Necessary or Appropriate     Sepsis, SHANTANU, weakness     Certifying Practitioner Information   11. Name of Practitioner: Joaquin Ding MD   12. [place] Medicaid Provider Number, If Applicable:  [address] Provider Identifier (NPI):      Signature Information   14. Date of Signature: 07/19/22 15. Name of Person Signing: [facility]   57. Signature and Professional Designation: Electronically signed by [facility] ESTHER CHAVEZ on 7/19/2022 at 11:56 AM  Discharge planner     [facility] [id number]  Rev. 7/2015    [facility] Encounter Date/Time: 7/5/2022 Clemencia Palm Daniele Account: [de-identified]    MRN: [id number]    Patient: Scherrie Crigler Serial #: [id number]      ENCOUNTER          Patient Class: I Private Enc?   No Unit RM BD: SEYZ 4S PICU 4508/4508-A   Hospital Service: MED   Encounter DX: Shock ([facility] [R57.9]   ADM Provider: Danny Pacheco MD   Procedure:     ATT Provider: Danny Pacheco MD   REF Provider:        Admission DX: Shock ([address] Uremia, SHANTANU (acute kidney injury) ([address] Poisoning by digoxin, accidental or unintentional, initial encounter, Altered mental status, unspecified altered mental status type, Acute pancreatitis, unspecified complication status, unspecified pancreatitis type and DX codes: R57.9, N19, N17.9, T46.0X1A, R41.82, K85.90      PATIENT                 Name: Verito Rodriges : 1950 (71 yrs)   Address: [address] [place] [address] 65 Sex: Female   [place]: [address] [place] [address]         Marital Status:    Employer: RETIRED         Yazidism: Congregational   Primary Care Provider:           Primary Phone: [phone]   EMERGENCY CONTACT   Contact Name Legal Guardian? Relationship to Patient Home Phone Work Phone   1. Tom Thomas  2. Ever Fu No  No Child  Child [phone]                 GUARANTOR            Guarantor: Verito Rodriges     : 1950   Address: [facility] [address] Sex: Female   [place] [address]     Relation to Patient: Self       Home Phone: [phone]   Guarantor ID: [id number]       Work Phone:     Guarantor Employer: RETIRED         Status: RETIRED      COVERAGE  PRIMARY INSURANCE   Payor: [facility] MEDICARE Plan: Dulce William CO*   Payor Address: ,          Group Number: [id number] Insurance Type: INDEMNITY   Subscriber Name: Elizabeth Fisher : 1950   Subscriber ID: [id number] Pat. Rel. to Sub: Self   SECONDARY INSURANCE   Payor:  Nim* Plan: Vaughn Nuñez M*   Payor Address: [facility], [address] [place] [address]          Group Number: [id number] Insurance Type: INDEMNITY   Subscriber Name: Elizabeth Fisher : 1950   Subscriber ID: [id number] Pat.  Rel. to Sub: SELF           CSN: [id number]

## 2022-07-05 NOTE — ED PROVIDER NOTES
ATTENDING PROVIDER ATTESTATION:     Jann Morejon presented to the emergency department for evaluation of Altered Mental Status (per ems family reports ams x 45 minutes, recent psych med changes and right toe amputation)   and was initially evaluated by the Medical Resident. See Original ED Note for H&P and ED course above. I have reviewed and discussed the case, including pertinent history (medical, surgical, family and social) and exam findings with the Medical Resident assigned to Oscarjordan Morejon. I have personally performed and/or participated in the history, exam, medical decision making, and procedures and agree with all pertinent clinical information and any additional changes or corrections are noted below in my assessment and plan. I have discussed this patient in detail with the resident, and provided the instruction and education,       I have reviewed my findings and recommendations with the assigned Medical Resident, Jann Morejon and members of family present at the time of disposition. I have performed a history and physical examination of this patient and directly supervised the resident caring for this patient      History of Present Illness:    Presents to the ED for hypotension and low blood pressure, beginning prior to arrival.  The complaint has been constant, severe in severity, and worsened by nothing. Here for evaluation of low blood pressure and not feeling well. EMS reports she was confused and had low BP. They report recent change in her psych meds. On arrival, the patient complains of abdominal pain. She is alert and oriented at this time. She denies chest pain. Denies shortness of breath.       Review of Systems:   A complete review of systems was performed and pertinent positives and negatives are stated within HPI, all other systems reviewed and are negative.    --------------------------------------------- PAST HISTORY ---------------------------------------------  Past Medical History:  has a past medical history of Acute on chronic congestive heart failure ([facility] Anxiety, Asthma, CAD (coronary artery disease), Cancer ([facility] Chronic kidney disease, Depression, Diabetes mellitus ([facility] H/O mammogram, Hx MRSA infection, Hyperlipidemia, Hypertension, Lateral epicondylitis, SERENA on CPAP, Parkinson's disease ([facility] and Tubal ligation status. Past Surgical History:  has a past surgical history that includes Gallbladder surgery; Toe amputation; Cholecystectomy; Colonoscopy (7/29/15); Endoscopy, colon, diagnostic (7/19/15); Upper gastrointestinal endoscopy; lumbar laminectomy; Tonsillectomy; Breast lumpectomy; Breast reduction surgery; Cardiac catheterization (4/28/2014); and Cardiac catheterization (01/11/2022). Social History:  reports that she has never smoked. She has never used smokeless tobacco. She reports that she does not drink alcohol and does not use drugs. Family History: family history includes Cancer in her father and mother; Diabetes in her sister; Heart Disease in her sister; Other in her brother; Seizures in her son. Unless otherwise noted, family history is non contributory    The patients home medications have been reviewed. Allergies: Ciprofloxacin and Codeine    Physical Exam:  Constitutional/General: Alert and oriented  Head: Normocephalic and atraumatic  Eyes: PERRL, EOMI, sclera non icteric  ENT: Oropharynx clear, handling secretions  Neck: Supple, full ROM, no stridor, no meningeal signs  Respiratory: Lungs with decreased BS bilaterally. Not in respiratory distress  Cardiovascular:  Regular rate. Regular rhythm. No murmurs, no gallops, no rubs. 2+ distal pulses. Equal extremity pulses. GI:  Abdomen Soft, generalized tenderness. Non distended. No rebound, guarding, or rigidity. No pulsatile masses. Musculoskeletal: Moves all extremities x 4. Warm and dry.   With prior amputation and recent amputation with sutures in place. There was some purulent drainage from the suture site. no crepitus, no fluctuance. ,  no clubbing, no cyanosis. Palpable peripheral pulses  Integument: skin warm and dry. No rashes. Neurologic: GCS 15, no focal deficits  Psychiatric: Normal Affect      I directly supervised any procedures performed by the resident and was present for the procedure including all critical portions of the procedure      The cardiac monitor revealed sinus rhythm with a heart rate in the 60s as interpreted by me. The cardiac monitor was ordered secondary to the patient's hypotension and confusion and to monitor the patient for dysrhythmia. CPT [id number]      I, Dr. Paulino Devlin, am the primary provider of record    My Medical Decision Making:         Patient appears to be in shock. Likely multifactorial.  Concerns for possible sepsis based on her foot wound although no fever no white count although she is ill-appearing. Also concerns with acute renal failure as well as uremia. Is also bradycardic with her hypotension and has a toxic digoxin level. Digibind was given. She is already begun on IV fluids and already given IV antibiotics. Central venous catheter was placed and vasopressors were initiated. Critical care was consulted for admission. Accepted by Dr. Yoly Hagen. House medicine consulted for admission. CRITICAL CARE:  Please note that the withdrawal or failure to initiate urgent interventions for this patient would likely result in a life threatening deterioration or permanent disability. Accordingly this patient received 40 minutes of critical care time, including coordination of care, and direct bedside care and excluding separately billable procedures. 1. SHANTANU (acute kidney injury) ([facility]    2. Shock ([address]    3. Uremia    4. Poisoning by digoxin, accidental or unintentional, initial encounter    5. Altered mental status, unspecified altered mental status type    6.  Acute pancreatitis, unspecified complication status, unspecified pancreatitis type           Yuliya Isaacs MD  07/05/22 9173

## 2022-07-05 NOTE — LETTER
[place] Department Medicaid  CERTIFICATION OF NECESSITY  FOR NON-EMERGENCY TRANSPORTATION   BY GROUND AMBULANCE      Individual Information   1. Name: Ed Kim 2. [place] Medicaid Billing Number:    3. Address: [address]  [address]      Transportation Provider Information   4. Provider Name:    5. [place] Medicaid Provider Number:  National Provider Identifier (NPI):      Certification  7. Criteria:  During transport, this individual requires:  [x] Medical treatment or continuous     supervision by an EMT. [x] The administration or regulation of oxygen by another person. [] Supervised protective restraint. 8. Period Beginning Date: 07/14/22   9. Length  [] Not more than 10 day(s)  [] One Year     Additional Information Relevant to Certification   10. Comments or Explanations, If Necessary or Appropriate     Acute encephalopathy, shock, max assist for transfers, chf     Certifying Practitioner Information   11. Name of Practitioner: Ebony Thao MD   12. [place] Medicaid Provider Number, If Applicable:  [address] Provider Identifier (NPI):      Signature Information   14. Date of Signature: 07/14/22 15. Name of Person Signing: [place], [place]   48. Signature and Professional Designation: Electronically signed by [facility]HER MIRANDA on 7/14/2022 at 12:49 PM  Discharge planner     [facility] [id number]  Rev. 7/2015    [address] [place] [address] Encounter Date/Time: 7/5/2022 Clemencia Arnett Arpita Sanabria Account: [de-identified]    MRN: [id number]    Patient: Taras Pradhan Serial #: [id number]      ENCOUNTER          Patient Class: I Private Enc?   No Unit RM BD: SEYZ 4S PICU 4508/4508-A   Hospital Service: MED   Encounter DX: Shock ([facility] [R57.9]   ADM Provider: Sedrick Spears MD   Procedure:     ATT Provider: Sedrick Spears MD   REF Provider:        Admission DX: Shock ([address] Uremia, SHANTANU (acute kidney injury) ([facility] Poisoning by digoxin, accidental or unintentional, initial encounter, Altered mental status, unspecified altered mental status type, Acute pancreatitis, unspecified complication status, unspecified pancreatitis type and DX codes: R57.9, N19, N17.9, T46.0X1A, R41.82, K85.90      PATIENT                 Name: Shona Wilson : 1950 (71 yrs)   Address: [address] [place] [address] Sex: Female   [place] city: [address] [place] [address]         Marital Status:    Employer: RETIRED         Shinto: Hindu   Primary Care Provider:           Primary Phone: [phone]   EMERGENCY CONTACT   Contact Name Legal Guardian? Relationship to Patient Home Phone Work Phone   1. Tom Thomas  2. Dewayne Kaminski No  No Child  Child [phone]                 GUARANTOR            Guarantor: Shona Wilson     : 1950   Address: [facility] [address] Sex: Female   [place] [address]     Relation to Patient: Self       Home Phone: [phone]   Guarantor ID: [id number]       Work Phone:     Guarantor Employer: RETIRED         Status: RETIRED      COVERAGE  PRIMARY INSURANCE   Payor: [facility] MEDICARE Plan: Gokul Cane CO*   Payor Address: ,          Group Number: [id number] Insurance Type: INDEMNITY   Subscriber Name: Jason Michaud : 1950   Subscriber ID: [id number] Pat. Rel. to Sub: Self   SECONDARY INSURANCE   Payor: Jennifer Arredondo* Plan: Blas Mcburney M*   Payor Address:  [facility], [address] [place] [address]          Group [place]: [id number] Insurance Type: INDEMNITY   Subscriber Name: Jason Michaud : 1950   Subscriber ID: [id number] Pat.  Rel. to Sub: SELF           CSN: [id number]

## 2022-07-05 NOTE — ED NOTES
Notified Dr Freddy Bauer of pt Temp 92.8F     Gio Collins, LORNA  07/05/22 9536      ADDENDUM  Applied forced warm air blanket on patient, set to Ashlee 7, LORNA  07/05/22 5592

## 2022-07-05 NOTE — ED PROVIDER NOTES
breath sounds. No wheezing. Abdominal:      General: Abdomen is flat. Bowel sounds are normal. There is distension. Palpations: Abdomen is soft. There is no mass. Tenderness: There is abdominal tenderness (Epigastric and LUQ). There is no guarding. Musculoskeletal:         General: No swelling or tenderness. Normal range of motion. Cervical back: Normal range of motion and neck supple. No rigidity. Skin:     General: Skin is warm and dry. Neurological:      General: No focal deficit present. Mental Status: Mental status is at baseline. She is lethargic. Sensory: No sensory deficit. Motor: No weakness. Procedures   Central Line Placement Procedure Note    Indication: vascular access, poor peripheral access and hypovolemia    Consent: The patient provided verbal consent for this procedure. Procedure: The patient was positioned appropriately and the skin over the right femoral vein was prepped with betadine and draped in a sterile fashion. Local anesthesia was obtained by infiltration using 1% Lidocaine without epinephrine. A large bore needle was used to identify the vein. A guide wire was then inserted into the vein through the needle. A triple lumen catheter was then inserted into the vessel over the guide wire using the Seldinger technique. All ports showed good, free flowing blood return and were flushed with saline solution. The catheter was then securely fastened to the skin with suture at 20 cm. Two sutures were placed into the kit included tube clamp, proximal eyelets and a suture end from each of the securing sutures was extended around the catheter and tied to the proximal eyelets as an added measure to prevent dislodgement. An antibiotic disk was placed and the site was then covered with a sterile dressing. A post procedure X-ray was not indicated. The patient tolerated the procedure well.     Complications: None          MDM  Number of Diagnoses or Management Options  Acute pancreatitis, unspecified complication status, unspecified pancreatitis type  SHANTANU (acute kidney injury) ([facility]  Altered mental status, unspecified altered mental status type  Poisoning by digoxin, accidental or unintentional, initial encounter  Shock ([facility]  Uremia  Diagnosis management comments: This is a 66year old female who presents to the ED due to altered mental status. Patient initially given 30 cc/kg fluid bolus and started on Zosyn for suspected intra-abdominal infection. She remained hypotensive and bradycardic for short period of time. CBC revealed thrombocytopenia with platelets of 83 and anemia with hemoglobin 10.2. CMP revealed a potassium of 5.5 and an SHANTANU with a creatinine of 3.3. Lactic was elevated 2.8. Her INR was also elevated at 3.1. Lipase was 148 initial troponin was 57 with repeat pending. Digoxin level was 3.7 patient was ordered Digibind for suspected acute digoxin yesterday. CT abdomen pelvis revealed increasing fluid within the abdomen as well as stranding seen around the mesentery which could correlate to possible pancreatitis. CT chest revealed bilateral lung infiltrates greatest consolidation in right lower lobe probably secondary to atelectasis or pneumonia with a moderate right pleural effusion. Chest x-ray also showed new opacity on the right which could be pleural effusion or infiltrate. Vancomycin was added and patient was given a central line and started on Levophed given Solu-Cortef as well. Patient will be admitted to the ICU by Dr. Vijay Dey where she has been accepted by Dr. Shahnaz Russell.                 --------------------------------------------- PAST HISTORY ---------------------------------------------  Past Medical History:  has a past medical history of Acute on chronic congestive heart failure ([facility] Anxiety, Asthma, CAD (coronary artery disease), Cancer ([facility] Chronic kidney disease, Depression, Diabetes mellitus ([facility] H/O mammogram, Hx MRSA infection, Hyperlipidemia, Hypertension, Lateral epicondylitis, SERENA on CPAP, Parkinson's disease ([facility] and Tubal ligation status. Past Surgical History:  has a past surgical history that includes Gallbladder surgery; Toe amputation; Cholecystectomy; Colonoscopy (7/29/15); Endoscopy, colon, diagnostic (7/19/15); Upper gastrointestinal endoscopy; lumbar laminectomy; Tonsillectomy; Breast lumpectomy; Breast reduction surgery; Cardiac catheterization (4/28/2014); and Cardiac catheterization (01/11/2022). Social History:  reports that she has never smoked. She has never used smokeless tobacco. She reports that she does not drink alcohol and does not use drugs. Family History: family history includes Cancer in her father and mother; Diabetes in her sister; Heart Disease in her sister; Other in her brother; Seizures in her son. The patients home medications have been reviewed.     Allergies: Ciprofloxacin and Codeine    -------------------------------------------------- RESULTS -------------------------------------------------    LABS:  Results for orders placed or performed during the hospital encounter of 07/05/22   CBC auto differential   Result Value Ref Range    WBC 6.5 4.5 - 11.5 E9/L    RBC 3.96 3.50 - 5.50 E12/L    Hemoglobin 10.2 (L) 11.5 - 15.5 g/dL    Hematocrit 34.5 34.0 - 48.0 %    MCV 87.1 80.0 - 99.9 fL    MCH 25.8 (L) 26.0 - 35.0 pg    MCHC 29.6 (L) 32.0 - 34.5 %    RDW 15.8 (H) 11.5 - 15.0 fL    Platelets 83 (L) 464 - 450 E9/L    MPV 11.3 7.0 - 12.0 fL    Neutrophils % 78.2 43.0 - 80.0 %    Immature Granulocytes % 0.9 0.0 - 5.0 %    Lymphocytes % 16.4 (L) 20.0 - 42.0 %    Monocytes % 2.6 2.0 - 12.0 %    Eosinophils % 1.1 0.0 - 6.0 %    Basophils % 0.8 0.0 - 2.0 %    Neutrophils Absolute 5.12 1.80 - 7.30 E9/L    Immature Granulocytes # 0.06 E9/L    Lymphocytes Absolute 1.07 (L) 1.50 - 4.00 E9/L    Monocytes Absolute 0.17 0.10 - 0.95 E9/L    Eosinophils Absolute 0.07 0.05 - 0.50 E9/L Basophils Absolute 0.05 0.00 - 0.20 E9/L   Comprehensive Metabolic Panel w/ Reflex to MG   Result Value Ref Range    Sodium 133 132 - 146 mmol/L    Potassium reflex Magnesium 5.5 (H) 3.5 - 5.0 mmol/L    Chloride 92 (L) 98 - 107 mmol/L    CO2 26 22 - 29 mmol/L    Anion Gap 15 7 - 16 mmol/L    Glucose 89 74 - 99 mg/dL    BUN 95 (H) 6 - 23 mg/dL    CREATININE 3.3 (H) 0.5 - 1.0 mg/dL    GFR Non-African American 14 >=60 mL/min/1.73    GFR African American 17     Calcium 7.7 (L) 8.6 - 10.2 mg/dL    Total Protein 6.2 (L) 6.4 - 8.3 g/dL    Albumin 3.6 3.5 - 5.2 g/dL    Total Bilirubin 0.5 0.0 - 1.2 mg/dL    Alkaline Phosphatase 81 35 - 104 U/L    ALT 9 0 - 32 U/L    AST 15 0 - 31 U/L   Lactate, Sepsis   Result Value Ref Range    Lactic Acid, Sepsis 2.8 (H) 0.5 - 1.9 mmol/L   APTT   Result Value Ref Range    aPTT 42.9 (H) 24.5 - 35.1 sec   Protime-INR   Result Value Ref Range    Protime 33.7 (H) 9.3 - 12.4 sec    INR 3.1    Lipase   Result Value Ref Range    Lipase 148 (H) 13 - 60 U/L   Troponin   Result Value Ref Range    Troponin, High Sensitivity 57 (H) 0 - 9 ng/L   Digoxin Level   Result Value Ref Range    Digoxin Lvl 3.7 (HH) 0.8 - 2.0 ng/mL   Valproic Acid Level, Total   Result Value Ref Range    Valproic Acid Lvl 68 50 - 100 mcg/mL   Platelet Confirmation   Result Value Ref Range    Platelet Confirmation CONFIRMED    EKG 12 lead   Result Value Ref Range    Ventricular Rate 71 BPM    Atrial Rate 72 BPM    QRS Duration 94 ms    Q-T Interval 384 ms    QTc Calculation (Bazett) 417 ms    R Axis 120 degrees    T Axis -159 degrees       RADIOLOGY:  CT ABDOMEN PELVIS WO CONTRAST Additional Contrast? None   Final Result   Increasing fluid within the abdomen when compared to the prior study. The   anasarca is also increased in appearance of the prior examination. Mild stranding seen around the mesentery which correlation to clinical lab   values is recommended to exclude possible pancreatitis or volume overload.    Overall the appearance of the pancreas itself is grossly unremarkable. There   is only mild stranding seen throughout the mesenteric root. CT CHEST WO CONTRAST   Final Result   Bilateral lung infiltrates with greatest consolidation right lower lobe   probably secondary to atelectasis and or pneumonia. Moderate right pleural effusion. Cardiomegaly and small pericardial effusion. The pericardial effusion is new. Small volume right upper abdominal ascites along with anasarca. This is new. XR CHEST PORTABLE   Final Result   New opacity on the right which may relate to pleural effusion with adjacent   atelectasis and or infiltrate. Cardiomegaly and pulmonary vascular   congestion implying elevated left heart filling pressures. EKG:  This EKG is signed and interpreted by me. Rate: 71  Rhythm: Atrial fibrillation  Interpretation: atrial fibrillation (chronic)  Comparison: changes compared to previous EKG      ------------------------- NURSING NOTES AND VITALS REVIEWED ---------------------------  Date / Time Roomed:  7/5/2022 12:25 PM  ED Bed Assignment:  15/15    The nursing notes within the ED encounter and vital signs as below have been reviewed.      Patient Vitals for the past 24 hrs:   BP Pulse Resp SpO2 Height Weight   07/05/22 1500 100/63 -- -- 98 % -- --   07/05/22 1342 (!) 96/36 -- -- -- -- --   07/05/22 1237 -- -- -- (!) 88 % -- --   07/05/22 1235 (!) 84/49 66 18 -- 5' (1.524 m) 230 lb (104.3 kg)       Oxygen Saturation Interpretation: Abnormal and Improved after treatment    ------------------------------------------ PROGRESS NOTES ------------------------------------------  Re-evaluation(s):  Time: 1700  Patients symptoms show no change  Repeat physical examination is not changed    Counseling:  I have spoken with the patient and discussed todays results, in addition to providing specific details for the plan of care and counseling regarding the diagnosis and prognosis. Their questions are answered at this time and they are agreeable with the plan of admission.    --------------------------------- ADDITIONAL PROVIDER NOTES ---------------------------------  Consultations:  Time: 2777. Spoke with Dr. Liane Holbrook. Discussed case. They will admit the patient. This patient's ED course included: a personal history and physicial examination, re-evaluation prior to disposition, multiple bedside re-evaluations, IV medications, cardiac monitoring, continuous pulse oximetry and complex medical decision making and emergency management    This patient has remained hemodynamically stable during their ED course. Diagnosis:  1. SHANTANU (acute kidney injury) ([facility]    2. Shock ([facility]    3. Uremia    4. Poisoning by digoxin, accidental or unintentional, initial encounter    5. Altered mental status, unspecified altered mental status type    6. Acute pancreatitis, unspecified complication status, unspecified pancreatitis type        Disposition:  Patient's disposition: Admit to CCU/ICU  Patient's condition is stable. Patient was seen and evaluated by myself and my attending Sukhwinder Weber MD. Assessment and Plan discussed with attending provider, please see attestation for final plan of care.      DO Son Dhaliwal DO  Resident  07/05/22 8736

## 2022-07-05 NOTE — ED NOTES
Received report from [facility]; assumed care of pt at this time; currently on 15 liters of oxygen via nonrebreather; will monitor     Ada Buckner RN  07/05/22 1922

## 2022-07-06 ENCOUNTER — APPOINTMENT (OUTPATIENT)
Dept: GENERAL RADIOLOGY | Age: 72
DRG: 870 | End: 2022-07-06
Payer: MEDICARE

## 2022-07-06 ENCOUNTER — APPOINTMENT (OUTPATIENT)
Dept: ULTRASOUND IMAGING | Age: 72
DRG: 870 | End: 2022-07-06
Payer: MEDICARE

## 2022-07-06 ENCOUNTER — APPOINTMENT (OUTPATIENT)
Dept: NEUROLOGY | Age: 72
DRG: 870 | End: 2022-07-06
Payer: MEDICARE

## 2022-07-06 PROBLEM — R56.9 SEIZURE (HCC): Status: ACTIVE | Noted: 2022-01-01

## 2022-07-06 LAB
AADO2: 151.7 MMHG
AADO2: 158.7 MMHG
AADO2: 326 MMHG
ACETAMINOPHEN LEVEL: <5 MCG/ML (ref 10–30)
ADENOVIRUS BY PCR: NOT DETECTED
ALBUMIN SERPL-MCNC: 2.8 G/DL (ref 3.5–5.2)
ALBUMIN SERPL-MCNC: 3.1 G/DL (ref 3.5–5.2)
ALP BLD-CCNC: 69 U/L (ref 35–104)
ALP BLD-CCNC: 70 U/L (ref 35–104)
ALT SERPL-CCNC: 8 U/L (ref 0–32)
ALT SERPL-CCNC: 9 U/L (ref 0–32)
AMMONIA: 61 UMOL/L (ref 11–51)
AMPHETAMINE SCREEN, URINE: NOT DETECTED
ANION GAP SERPL CALCULATED.3IONS-SCNC: 18 MMOL/L (ref 7–16)
ANION GAP SERPL CALCULATED.3IONS-SCNC: 19 MMOL/L (ref 7–16)
ANION GAP SERPL CALCULATED.3IONS-SCNC: 22 MMOL/L (ref 7–16)
ANION GAP SERPL CALCULATED.3IONS-SCNC: 23 MMOL/L (ref 7–16)
ANISOCYTOSIS: ABNORMAL
AST SERPL-CCNC: 14 U/L (ref 0–31)
AST SERPL-CCNC: 14 U/L (ref 0–31)
B.E.: -1.1 MMOL/L (ref -3–3)
B.E.: -1.5 MMOL/L (ref -3–3)
B.E.: -4.1 MMOL/L (ref -3–3)
BARBITURATE SCREEN URINE: NOT DETECTED
BASOPHILS ABSOLUTE: 0.01 E9/L (ref 0–0.2)
BASOPHILS ABSOLUTE: 0.01 E9/L (ref 0–0.2)
BASOPHILS RELATIVE PERCENT: 0.1 % (ref 0–2)
BASOPHILS RELATIVE PERCENT: 0.2 % (ref 0–2)
BENZODIAZEPINE SCREEN, URINE: NOT DETECTED
BILIRUB SERPL-MCNC: 0.6 MG/DL (ref 0–1.2)
BILIRUB SERPL-MCNC: 0.7 MG/DL (ref 0–1.2)
BORDETELLA PARAPERTUSSIS BY PCR: NOT DETECTED
BORDETELLA PERTUSSIS BY PCR: NOT DETECTED
BUN BLDV-MCNC: 101 MG/DL (ref 6–23)
BUN BLDV-MCNC: 103 MG/DL (ref 6–23)
BUN BLDV-MCNC: 105 MG/DL (ref 6–23)
BUN BLDV-MCNC: 105 MG/DL (ref 6–23)
BUN BLDV-MCNC: 108 MG/DL (ref 6–23)
BUN BLDV-MCNC: 108 MG/DL (ref 6–23)
C-REACTIVE PROTEIN: 5 MG/DL (ref 0–0.4)
CALCIUM IONIZED: 0.91 MMOL/L (ref 1.15–1.33)
CALCIUM SERPL-MCNC: 6.7 MG/DL (ref 8.6–10.2)
CALCIUM SERPL-MCNC: 6.9 MG/DL (ref 8.6–10.2)
CALCIUM SERPL-MCNC: 7 MG/DL (ref 8.6–10.2)
CALCIUM SERPL-MCNC: 7.1 MG/DL (ref 8.6–10.2)
CANNABINOID SCREEN URINE: NOT DETECTED
CHLAMYDOPHILIA PNEUMONIAE BY PCR: NOT DETECTED
CHLORIDE BLD-SCNC: 90 MMOL/L (ref 98–107)
CHLORIDE BLD-SCNC: 92 MMOL/L (ref 98–107)
CHLORIDE BLD-SCNC: 93 MMOL/L (ref 98–107)
CHLORIDE BLD-SCNC: 93 MMOL/L (ref 98–107)
CHLORIDE BLD-SCNC: 94 MMOL/L (ref 98–107)
CHLORIDE BLD-SCNC: 95 MMOL/L (ref 98–107)
CHLORIDE URINE RANDOM: <20 MMOL/L
CO2: 18 MMOL/L (ref 22–29)
CO2: 19 MMOL/L (ref 22–29)
CO2: 21 MMOL/L (ref 22–29)
CO2: 22 MMOL/L (ref 22–29)
COCAINE METABOLITE SCREEN URINE: NOT DETECTED
COHB: 0 % (ref 0–1.5)
COHB: 0.1 % (ref 0–1.5)
COHB: 0.2 % (ref 0–1.5)
CORONAVIRUS 229E BY PCR: NOT DETECTED
CORONAVIRUS HKU1 BY PCR: NOT DETECTED
CORONAVIRUS NL63 BY PCR: NOT DETECTED
CORONAVIRUS OC43 BY PCR: NOT DETECTED
CORTISOL TOTAL: 63.4 MCG/DL (ref 2.68–18.4)
CREAT SERPL-MCNC: 2.9 MG/DL (ref 0.5–1)
CREAT SERPL-MCNC: 2.9 MG/DL (ref 0.5–1)
CREAT SERPL-MCNC: 3.1 MG/DL (ref 0.5–1)
CREAT SERPL-MCNC: 3.1 MG/DL (ref 0.5–1)
CREAT SERPL-MCNC: 3.2 MG/DL (ref 0.5–1)
CREAT SERPL-MCNC: 3.2 MG/DL (ref 0.5–1)
CREATININE URINE: 72 MG/DL (ref 29–226)
CRITICAL: ABNORMAL
DATE ANALYZED: ABNORMAL
DATE OF COLLECTION: ABNORMAL
DIGOXIN LEVEL: 2 NG/ML (ref 0.8–2)
EKG ATRIAL RATE: 83 BPM
EKG Q-T INTERVAL: 334 MS
EKG QRS DURATION: 72 MS
EKG QTC CALCULATION (BAZETT): 426 MS
EKG R AXIS: 82 DEGREES
EKG T AXIS: -115 DEGREES
EKG VENTRICULAR RATE: 98 BPM
EOSINOPHILS ABSOLUTE: 0 E9/L (ref 0.05–0.5)
EOSINOPHILS ABSOLUTE: 0 E9/L (ref 0.05–0.5)
EOSINOPHILS RELATIVE PERCENT: 0 % (ref 0–6)
EOSINOPHILS RELATIVE PERCENT: 0 % (ref 0–6)
ETHANOL: <10 MG/DL (ref 0–0.08)
FENTANYL SCREEN, URINE: POSITIVE
FIO2: 100 %
FIO2: 50 %
FIO2: 50 %
FOLATE: 5.8 NG/ML (ref 4.8–24.2)
GFR AFRICAN AMERICAN: 17
GFR AFRICAN AMERICAN: 17
GFR AFRICAN AMERICAN: 18
GFR AFRICAN AMERICAN: 18
GFR AFRICAN AMERICAN: 19
GFR AFRICAN AMERICAN: 19
GFR NON-AFRICAN AMERICAN: 14 ML/MIN/1.73
GFR NON-AFRICAN AMERICAN: 14 ML/MIN/1.73
GFR NON-AFRICAN AMERICAN: 15 ML/MIN/1.73
GFR NON-AFRICAN AMERICAN: 15 ML/MIN/1.73
GFR NON-AFRICAN AMERICAN: 16 ML/MIN/1.73
GFR NON-AFRICAN AMERICAN: 16 ML/MIN/1.73
GLUCOSE BLD-MCNC: 108 MG/DL (ref 74–99)
GLUCOSE BLD-MCNC: 118 MG/DL (ref 74–99)
GLUCOSE BLD-MCNC: 119 MG/DL (ref 74–99)
GLUCOSE BLD-MCNC: 120 MG/DL (ref 74–99)
GLUCOSE BLD-MCNC: 169 MG/DL (ref 74–99)
GLUCOSE BLD-MCNC: 196 MG/DL (ref 74–99)
HCO3: 18.5 MMOL/L (ref 22–26)
HCO3: 21.4 MMOL/L (ref 22–26)
HCO3: 23.6 MMOL/L (ref 22–26)
HCT VFR BLD CALC: 28.6 % (ref 34–48)
HCT VFR BLD CALC: 29.7 % (ref 34–48)
HEMOGLOBIN: 9 G/DL (ref 11.5–15.5)
HEMOGLOBIN: 9.4 G/DL (ref 11.5–15.5)
HHB: 0.6 % (ref 0–5)
HHB: 1.3 % (ref 0–5)
HHB: 1.6 % (ref 0–5)
HUMAN METAPNEUMOVIRUS BY PCR: NOT DETECTED
HUMAN RHINOVIRUS/ENTEROVIRUS BY PCR: NOT DETECTED
HYPOCHROMIA: ABNORMAL
IMMATURE GRANULOCYTES #: 0.09 E9/L
IMMATURE GRANULOCYTES #: 0.09 E9/L
IMMATURE GRANULOCYTES %: 1.2 % (ref 0–5)
IMMATURE GRANULOCYTES %: 1.5 % (ref 0–5)
INFLUENZA A BY PCR: NOT DETECTED
INFLUENZA B BY PCR: NOT DETECTED
LAB: ABNORMAL
LACTIC ACID: 2.4 MMOL/L (ref 0.5–2.2)
LACTIC ACID: 2.5 MMOL/L (ref 0.5–2.2)
LACTIC ACID: 3.1 MMOL/L (ref 0.5–2.2)
LACTIC ACID: 3.6 MMOL/L (ref 0.5–2.2)
LYMPHOCYTES ABSOLUTE: 0.58 E9/L (ref 1.5–4)
LYMPHOCYTES ABSOLUTE: 0.61 E9/L (ref 1.5–4)
LYMPHOCYTES RELATIVE PERCENT: 8 % (ref 20–42)
LYMPHOCYTES RELATIVE PERCENT: 9.6 % (ref 20–42)
Lab: ABNORMAL
MAGNESIUM: 1.7 MG/DL (ref 1.6–2.6)
MAGNESIUM: 2 MG/DL (ref 1.6–2.6)
MAGNESIUM: 2 MG/DL (ref 1.6–2.6)
MCH RBC QN AUTO: 26.1 PG (ref 26–35)
MCH RBC QN AUTO: 26.3 PG (ref 26–35)
MCHC RBC AUTO-ENTMCNC: 31.5 % (ref 32–34.5)
MCHC RBC AUTO-ENTMCNC: 31.6 % (ref 32–34.5)
MCV RBC AUTO: 82.9 FL (ref 80–99.9)
MCV RBC AUTO: 83.2 FL (ref 80–99.9)
METER GLUCOSE: 174 MG/DL (ref 74–99)
METER GLUCOSE: 209 MG/DL (ref 74–99)
METHADONE SCREEN, URINE: NOT DETECTED
METHB: 0.4 % (ref 0–1.5)
METHB: 0.4 % (ref 0–1.5)
METHB: 0.5 % (ref 0–1.5)
MODE: AC
MONOCYTES ABSOLUTE: 0.05 E9/L (ref 0.1–0.95)
MONOCYTES ABSOLUTE: 0.09 E9/L (ref 0.1–0.95)
MONOCYTES RELATIVE PERCENT: 0.8 % (ref 2–12)
MONOCYTES RELATIVE PERCENT: 1.2 % (ref 2–12)
MYCOPLASMA PNEUMONIAE BY PCR: NOT DETECTED
NEUTROPHILS ABSOLUTE: 5.34 E9/L (ref 1.8–7.3)
NEUTROPHILS ABSOLUTE: 6.81 E9/L (ref 1.8–7.3)
NEUTROPHILS RELATIVE PERCENT: 87.9 % (ref 43–80)
NEUTROPHILS RELATIVE PERCENT: 89.5 % (ref 43–80)
O2 SATURATION: 98.8 % (ref 92–98.5)
O2 SATURATION: 99.2 % (ref 92–98.5)
O2 SATURATION: 99.8 % (ref 92–98.5)
O2HB: 97.9 % (ref 94–97)
O2HB: 98.1 % (ref 94–97)
O2HB: 98.9 % (ref 94–97)
OPERATOR ID: 366
OPERATOR ID: 7296
OPERATOR ID: 7296
OPIATE SCREEN URINE: NOT DETECTED
OVALOCYTES: ABNORMAL
OXYCODONE URINE: NOT DETECTED
PARAINFLUENZA VIRUS 1 BY PCR: NOT DETECTED
PARAINFLUENZA VIRUS 2 BY PCR: NOT DETECTED
PARAINFLUENZA VIRUS 3 BY PCR: NOT DETECTED
PARAINFLUENZA VIRUS 4 BY PCR: NOT DETECTED
PATIENT TEMP: 37 C
PCO2: 26 MMHG (ref 35–45)
PCO2: 30 MMHG (ref 35–45)
PCO2: 39.5 MMHG (ref 35–45)
PDW BLD-RTO: 15.9 FL (ref 11.5–15)
PDW BLD-RTO: 15.9 FL (ref 11.5–15)
PEEP/CPAP: 8 CMH2O
PFO2: 2.82 MMHG/%
PFO2: 3.17 MMHG/%
PFO2: 3.36 MMHG/%
PH BLOOD GAS: 7.39 (ref 7.35–7.45)
PH BLOOD GAS: 7.47 (ref 7.35–7.45)
PH BLOOD GAS: 7.47 (ref 7.35–7.45)
PHENCYCLIDINE SCREEN URINE: NOT DETECTED
PHOSPHORUS: 6.2 MG/DL (ref 2.5–4.5)
PHOSPHORUS: 6.6 MG/DL (ref 2.5–4.5)
PHOSPHORUS: 6.7 MG/DL (ref 2.5–4.5)
PLATELET # BLD: 80 E9/L (ref 130–450)
PLATELET # BLD: 80 E9/L (ref 130–450)
PLATELET CONFIRMATION: NORMAL
PLATELET CONFIRMATION: NORMAL
PMV BLD AUTO: 11.5 FL (ref 7–12)
PMV BLD AUTO: 12.3 FL (ref 7–12)
PO2: 140.9 MMHG (ref 75–100)
PO2: 158.6 MMHG (ref 75–100)
PO2: 336 MMHG (ref 75–100)
POIKILOCYTES: ABNORMAL
POTASSIUM REFLEX MAGNESIUM: 5 MMOL/L (ref 3.5–5)
POTASSIUM SERPL-SCNC: 4.44 MMOL/L (ref 3.5–5)
POTASSIUM SERPL-SCNC: 4.44 MMOL/L (ref 3.5–5)
POTASSIUM SERPL-SCNC: 4.5 MMOL/L (ref 3.5–5)
POTASSIUM SERPL-SCNC: 4.6 MMOL/L (ref 3.5–5)
POTASSIUM SERPL-SCNC: 4.8 MMOL/L (ref 3.5–5)
POTASSIUM SERPL-SCNC: 4.9 MMOL/L (ref 3.5–5)
POTASSIUM SERPL-SCNC: 4.9 MMOL/L (ref 3.5–5)
POTASSIUM, UR: 51.6 MMOL/L
PRO-BNP: ABNORMAL PG/ML (ref 0–125)
PROCALCITONIN: 0.22 NG/ML (ref 0–0.08)
RBC # BLD: 3.45 E12/L (ref 3.5–5.5)
RBC # BLD: 3.57 E12/L (ref 3.5–5.5)
RESPIRATORY SYNCYTIAL VIRUS BY PCR: NOT DETECTED
RI(T): 0.96
RI(T): 0.97
RI(T): 1.13
RR MECHANICAL: 14 B/MIN
RR MECHANICAL: 18 B/MIN
RR MECHANICAL: 18 B/MIN
SALICYLATE, SERUM: <0.3 MG/DL (ref 0–30)
SARS-COV-2, PCR: NOT DETECTED
SCHISTOCYTES: ABNORMAL
SEDIMENTATION RATE, ERYTHROCYTE: 6 MM/HR (ref 0–20)
SODIUM BLD-SCNC: 131 MMOL/L (ref 132–146)
SODIUM BLD-SCNC: 133 MMOL/L (ref 132–146)
SODIUM BLD-SCNC: 133 MMOL/L (ref 132–146)
SODIUM BLD-SCNC: 134 MMOL/L (ref 132–146)
SODIUM BLD-SCNC: 134 MMOL/L (ref 132–146)
SODIUM BLD-SCNC: 136 MMOL/L (ref 132–146)
SODIUM URINE: <20 MMOL/L
SOURCE, BLOOD GAS: ABNORMAL
T4 FREE: 0.97 NG/DL (ref 0.93–1.7)
TEAR DROP CELLS: ABNORMAL
THB: 10.1 G/DL (ref 11.5–16.5)
THB: 10.3 G/DL (ref 11.5–16.5)
THB: 10.7 G/DL (ref 11.5–16.5)
TIME ANALYZED: 1058
TIME ANALYZED: 252
TIME ANALYZED: 646
TOTAL PROTEIN: 5.1 G/DL (ref 6.4–8.3)
TOTAL PROTEIN: 5.5 G/DL (ref 6.4–8.3)
TRICYCLIC ANTIDEPRESSANTS SCREEN SERUM: NEGATIVE NG/ML
TROPONIN, HIGH SENSITIVITY: 49 NG/L (ref 0–9)
TSH SERPL DL<=0.05 MIU/L-ACNC: 4.58 UIU/ML (ref 0.27–4.2)
UREA NITROGEN, UR: 460 MG/DL (ref 800–1666)
VANCOMYCIN RANDOM: 34.4 MCG/ML (ref 5–40)
VITAMIN B-12: 797 PG/ML (ref 211–946)
VT MECHANICAL: 350 ML
WBC # BLD: 6.1 E9/L (ref 4.5–11.5)
WBC # BLD: 7.6 E9/L (ref 4.5–11.5)

## 2022-07-06 PROCEDURE — 82746 ASSAY OF FOLIC ACID SERUM: CPT

## 2022-07-06 PROCEDURE — 83735 ASSAY OF MAGNESIUM: CPT

## 2022-07-06 PROCEDURE — 83880 ASSAY OF NATRIURETIC PEPTIDE: CPT

## 2022-07-06 PROCEDURE — 80143 DRUG ASSAY ACETAMINOPHEN: CPT

## 2022-07-06 PROCEDURE — 84145 PROCALCITONIN (PCT): CPT

## 2022-07-06 PROCEDURE — 84484 ASSAY OF TROPONIN QUANT: CPT

## 2022-07-06 PROCEDURE — 82962 GLUCOSE BLOOD TEST: CPT

## 2022-07-06 PROCEDURE — 80048 BASIC METABOLIC PNL TOTAL CA: CPT

## 2022-07-06 PROCEDURE — 02HV33Z INSERTION OF INFUSION DEVICE INTO SUPERIOR VENA CAVA, PERCUTANEOUS APPROACH: ICD-10-PCS | Performed by: INTERNAL MEDICINE

## 2022-07-06 PROCEDURE — 2500000003 HC RX 250 WO HCPCS: Performed by: EMERGENCY MEDICINE

## 2022-07-06 PROCEDURE — 94640 AIRWAY INHALATION TREATMENT: CPT

## 2022-07-06 PROCEDURE — 84300 ASSAY OF URINE SODIUM: CPT

## 2022-07-06 PROCEDURE — 83605 ASSAY OF LACTIC ACID: CPT

## 2022-07-06 PROCEDURE — 82077 ASSAY SPEC XCP UR&BREATH IA: CPT

## 2022-07-06 PROCEDURE — 84443 ASSAY THYROID STIM HORMONE: CPT

## 2022-07-06 PROCEDURE — 87070 CULTURE OTHR SPECIMN AEROBIC: CPT

## 2022-07-06 PROCEDURE — 93970 EXTREMITY STUDY: CPT

## 2022-07-06 PROCEDURE — 6360000002 HC RX W HCPCS

## 2022-07-06 PROCEDURE — 93971 EXTREMITY STUDY: CPT | Performed by: RADIOLOGY

## 2022-07-06 PROCEDURE — 80179 DRUG ASSAY SALICYLATE: CPT

## 2022-07-06 PROCEDURE — APPSS60 APP SPLIT SHARED TIME 46-60 MINUTES

## 2022-07-06 PROCEDURE — 85651 RBC SED RATE NONAUTOMATED: CPT

## 2022-07-06 PROCEDURE — 87077 CULTURE AEROBIC IDENTIFY: CPT

## 2022-07-06 PROCEDURE — 80053 COMPREHEN METABOLIC PANEL: CPT

## 2022-07-06 PROCEDURE — 82330 ASSAY OF CALCIUM: CPT

## 2022-07-06 PROCEDURE — 6370000000 HC RX 637 (ALT 250 FOR IP)

## 2022-07-06 PROCEDURE — 82805 BLOOD GASES W/O2 SATURATION: CPT

## 2022-07-06 PROCEDURE — 99223 1ST HOSP IP/OBS HIGH 75: CPT | Performed by: INTERNAL MEDICINE

## 2022-07-06 PROCEDURE — A4216 STERILE WATER/SALINE, 10 ML: HCPCS

## 2022-07-06 PROCEDURE — C9113 INJ PANTOPRAZOLE SODIUM, VIA: HCPCS

## 2022-07-06 PROCEDURE — 86140 C-REACTIVE PROTEIN: CPT

## 2022-07-06 PROCEDURE — 36415 COLL VENOUS BLD VENIPUNCTURE: CPT

## 2022-07-06 PROCEDURE — 84132 ASSAY OF SERUM POTASSIUM: CPT

## 2022-07-06 PROCEDURE — 87206 SMEAR FLUORESCENT/ACID STAI: CPT

## 2022-07-06 PROCEDURE — 71045 X-RAY EXAM CHEST 1 VIEW: CPT

## 2022-07-06 PROCEDURE — 80202 ASSAY OF VANCOMYCIN: CPT

## 2022-07-06 PROCEDURE — 87205 SMEAR GRAM STAIN: CPT

## 2022-07-06 PROCEDURE — 2580000003 HC RX 258

## 2022-07-06 PROCEDURE — 82607 VITAMIN B-12: CPT

## 2022-07-06 PROCEDURE — 99222 1ST HOSP IP/OBS MODERATE 55: CPT | Performed by: NURSE PRACTITIONER

## 2022-07-06 PROCEDURE — 80307 DRUG TEST PRSMV CHEM ANLYZR: CPT

## 2022-07-06 PROCEDURE — 84439 ASSAY OF FREE THYROXINE: CPT

## 2022-07-06 PROCEDURE — 82533 TOTAL CORTISOL: CPT

## 2022-07-06 PROCEDURE — 2580000003 HC RX 258: Performed by: INTERNAL MEDICINE

## 2022-07-06 PROCEDURE — 2000000000 HC ICU R&B

## 2022-07-06 PROCEDURE — 82436 ASSAY OF URINE CHLORIDE: CPT

## 2022-07-06 PROCEDURE — 87186 SC STD MICRODIL/AGAR DIL: CPT

## 2022-07-06 PROCEDURE — 85025 COMPLETE CBC W/AUTO DIFF WBC: CPT

## 2022-07-06 PROCEDURE — 6370000000 HC RX 637 (ALT 250 FOR IP): Performed by: INTERNAL MEDICINE

## 2022-07-06 PROCEDURE — 2500000003 HC RX 250 WO HCPCS

## 2022-07-06 PROCEDURE — 84100 ASSAY OF PHOSPHORUS: CPT

## 2022-07-06 PROCEDURE — 95822 EEG COMA OR SLEEP ONLY: CPT

## 2022-07-06 PROCEDURE — 0202U NFCT DS 22 TRGT SARS-COV-2: CPT

## 2022-07-06 PROCEDURE — 82140 ASSAY OF AMMONIA: CPT

## 2022-07-06 PROCEDURE — 84133 ASSAY OF URINE POTASSIUM: CPT

## 2022-07-06 PROCEDURE — 6360000002 HC RX W HCPCS: Performed by: INTERNAL MEDICINE

## 2022-07-06 PROCEDURE — 87081 CULTURE SCREEN ONLY: CPT

## 2022-07-06 PROCEDURE — 82570 ASSAY OF URINE CREATININE: CPT

## 2022-07-06 PROCEDURE — 94003 VENT MGMT INPAT SUBQ DAY: CPT

## 2022-07-06 PROCEDURE — 84540 ASSAY OF URINE/UREA-N: CPT

## 2022-07-06 PROCEDURE — 93005 ELECTROCARDIOGRAM TRACING: CPT

## 2022-07-06 PROCEDURE — 80162 ASSAY OF DIGOXIN TOTAL: CPT

## 2022-07-06 RX ORDER — IPRATROPIUM BROMIDE AND ALBUTEROL SULFATE 2.5; .5 MG/3ML; MG/3ML
1 SOLUTION RESPIRATORY (INHALATION)
Status: DISCONTINUED | OUTPATIENT
Start: 2022-07-06 | End: 2022-07-27 | Stop reason: HOSPADM

## 2022-07-06 RX ORDER — MIDAZOLAM HYDROCHLORIDE 1 MG/ML
INJECTION INTRAMUSCULAR; INTRAVENOUS
Status: COMPLETED
Start: 2022-07-06 | End: 2022-07-06

## 2022-07-06 RX ORDER — MIDAZOLAM HYDROCHLORIDE 2 MG/2ML
4 INJECTION, SOLUTION INTRAMUSCULAR; INTRAVENOUS ONCE
Status: COMPLETED | OUTPATIENT
Start: 2022-07-06 | End: 2022-07-06

## 2022-07-06 RX ORDER — CHLORHEXIDINE GLUCONATE 0.12 MG/ML
15 RINSE ORAL 2 TIMES DAILY
Status: DISCONTINUED | OUTPATIENT
Start: 2022-07-06 | End: 2022-07-11

## 2022-07-06 RX ORDER — MAGNESIUM SULFATE 1 G/100ML
1000 INJECTION INTRAVENOUS ONCE
Status: COMPLETED | OUTPATIENT
Start: 2022-07-06 | End: 2022-07-06

## 2022-07-06 RX ORDER — LIDOCAINE HYDROCHLORIDE 10 MG/ML
INJECTION, SOLUTION EPIDURAL; INFILTRATION; INTRACAUDAL; PERINEURAL
Status: COMPLETED
Start: 2022-07-06 | End: 2022-07-06

## 2022-07-06 RX ORDER — LIDOCAINE HYDROCHLORIDE 10 MG/ML
5 INJECTION, SOLUTION INFILTRATION; PERINEURAL ONCE
Status: COMPLETED | OUTPATIENT
Start: 2022-07-06 | End: 2022-07-06

## 2022-07-06 RX ORDER — AMIODARONE HYDROCHLORIDE 200 MG/1
200 TABLET ORAL 2 TIMES DAILY
Status: DISCONTINUED | OUTPATIENT
Start: 2022-07-07 | End: 2022-07-10

## 2022-07-06 RX ORDER — SODIUM CHLORIDE, SODIUM LACTATE, POTASSIUM CHLORIDE, AND CALCIUM CHLORIDE .6; .31; .03; .02 G/100ML; G/100ML; G/100ML; G/100ML
500 INJECTION, SOLUTION INTRAVENOUS ONCE
Status: COMPLETED | OUTPATIENT
Start: 2022-07-06 | End: 2022-07-06

## 2022-07-06 RX ORDER — POLYETHYLENE GLYCOL 3350 17 G/17G
17 POWDER, FOR SOLUTION ORAL 2 TIMES DAILY
Status: DISCONTINUED | OUTPATIENT
Start: 2022-07-06 | End: 2022-07-27 | Stop reason: HOSPADM

## 2022-07-06 RX ORDER — LACTULOSE 10 G/15ML
20 SOLUTION ORAL 3 TIMES DAILY
Status: COMPLETED | OUTPATIENT
Start: 2022-07-06 | End: 2022-07-07

## 2022-07-06 RX ORDER — AMIODARONE HYDROCHLORIDE 200 MG/1
200 TABLET ORAL DAILY
Status: DISCONTINUED | OUTPATIENT
Start: 2022-07-14 | End: 2022-07-10

## 2022-07-06 RX ORDER — LIDOCAINE HYDROCHLORIDE 10 MG/ML
INJECTION, SOLUTION INFILTRATION; PERINEURAL
Status: DISCONTINUED
Start: 2022-07-06 | End: 2022-07-06 | Stop reason: WASHOUT

## 2022-07-06 RX ORDER — LIDOCAINE HYDROCHLORIDE 10 MG/ML
5 INJECTION, SOLUTION INFILTRATION; PERINEURAL ONCE
Status: DISCONTINUED | OUTPATIENT
Start: 2022-07-06 | End: 2022-07-06

## 2022-07-06 RX ORDER — ASPIRIN 81 MG/1
81 TABLET, CHEWABLE ORAL DAILY
Status: DISCONTINUED | OUTPATIENT
Start: 2022-07-07 | End: 2022-07-19

## 2022-07-06 RX ADMIN — Medication 6 MCG/MIN: at 20:01

## 2022-07-06 RX ADMIN — LIDOCAINE HYDROCHLORIDE 5 ML: 10 INJECTION, SOLUTION INFILTRATION; PERINEURAL at 01:00

## 2022-07-06 RX ADMIN — ATORVASTATIN CALCIUM 20 MG: 20 TABLET, FILM COATED ORAL at 08:02

## 2022-07-06 RX ADMIN — CHLORHEXIDINE GLUCONATE 15 ML: 1.2 RINSE ORAL at 20:06

## 2022-07-06 RX ADMIN — MIDAZOLAM 4 MG: 1 INJECTION INTRAMUSCULAR; INTRAVENOUS at 07:00

## 2022-07-06 RX ADMIN — SODIUM CHLORIDE, PRESERVATIVE FREE 10 ML: 5 INJECTION INTRAVENOUS at 20:05

## 2022-07-06 RX ADMIN — LIDOCAINE HYDROCHLORIDE 5 ML: 10 INJECTION, SOLUTION EPIDURAL; INFILTRATION; INTRACAUDAL; PERINEURAL at 01:00

## 2022-07-06 RX ADMIN — Medication 50 MCG/HR: at 00:32

## 2022-07-06 RX ADMIN — IPRATROPIUM BROMIDE AND ALBUTEROL SULFATE 1 AMPULE: .5; 2.5 SOLUTION RESPIRATORY (INHALATION) at 20:13

## 2022-07-06 RX ADMIN — IPRATROPIUM BROMIDE AND ALBUTEROL SULFATE 1 AMPULE: .5; 2.5 SOLUTION RESPIRATORY (INHALATION) at 08:43

## 2022-07-06 RX ADMIN — MAGNESIUM SULFATE HEPTAHYDRATE 1000 MG: 1 INJECTION, SOLUTION INTRAVENOUS at 05:20

## 2022-07-06 RX ADMIN — POLYETHYLENE GLYCOL 3350 17 G: 17 POWDER, FOR SOLUTION ORAL at 12:00

## 2022-07-06 RX ADMIN — PANTOPRAZOLE SODIUM 40 MG: 40 INJECTION, POWDER, FOR SOLUTION INTRAVENOUS at 08:02

## 2022-07-06 RX ADMIN — HYDROCORTISONE SODIUM SUCCINATE 100 MG: 100 INJECTION, POWDER, FOR SOLUTION INTRAMUSCULAR; INTRAVENOUS at 20:05

## 2022-07-06 RX ADMIN — PIPERACILLIN AND TAZOBACTAM 4500 MG: 4; .5 INJECTION, POWDER, FOR SOLUTION INTRAVENOUS at 04:15

## 2022-07-06 RX ADMIN — APIXABAN 5 MG: 5 TABLET, FILM COATED ORAL at 08:02

## 2022-07-06 RX ADMIN — ASPIRIN 81 MG: 81 TABLET, COATED ORAL at 08:02

## 2022-07-06 RX ADMIN — LACTULOSE 20 G: 20 SOLUTION ORAL at 20:06

## 2022-07-06 RX ADMIN — HYDROCORTISONE SODIUM SUCCINATE 100 MG: 100 INJECTION, POWDER, FOR SOLUTION INTRAMUSCULAR; INTRAVENOUS at 04:07

## 2022-07-06 RX ADMIN — LACTULOSE 20 G: 20 SOLUTION ORAL at 14:28

## 2022-07-06 RX ADMIN — VANCOMYCIN HYDROCHLORIDE 500 MG: 500 INJECTION, POWDER, LYOPHILIZED, FOR SOLUTION INTRAVENOUS at 11:58

## 2022-07-06 RX ADMIN — HYDROCORTISONE SODIUM SUCCINATE 100 MG: 100 INJECTION, POWDER, FOR SOLUTION INTRAMUSCULAR; INTRAVENOUS at 10:46

## 2022-07-06 RX ADMIN — Medication 2 MG/HR: at 07:02

## 2022-07-06 RX ADMIN — SODIUM CHLORIDE, POTASSIUM CHLORIDE, SODIUM LACTATE AND CALCIUM CHLORIDE 500 ML: 600; 310; 30; 20 INJECTION, SOLUTION INTRAVENOUS at 05:13

## 2022-07-06 RX ADMIN — MIDAZOLAM HYDROCHLORIDE 4 MG: 2 INJECTION, SOLUTION INTRAMUSCULAR; INTRAVENOUS at 07:00

## 2022-07-06 RX ADMIN — SODIUM CHLORIDE, PRESERVATIVE FREE 10 ML: 5 INJECTION INTRAVENOUS at 08:02

## 2022-07-06 RX ADMIN — CHLORHEXIDINE GLUCONATE 15 ML: 1.2 RINSE ORAL at 08:02

## 2022-07-06 RX ADMIN — IPRATROPIUM BROMIDE AND ALBUTEROL SULFATE 1 AMPULE: .5; 2.5 SOLUTION RESPIRATORY (INHALATION) at 11:53

## 2022-07-06 RX ADMIN — PIPERACILLIN AND TAZOBACTAM 4500 MG: 4; .5 INJECTION, POWDER, FOR SOLUTION INTRAVENOUS at 17:35

## 2022-07-06 RX ADMIN — IPRATROPIUM BROMIDE AND ALBUTEROL SULFATE 1 AMPULE: .5; 2.5 SOLUTION RESPIRATORY (INHALATION) at 16:40

## 2022-07-06 RX ADMIN — Medication 150 MCG/HR: at 08:03

## 2022-07-06 RX ADMIN — SODIUM CHLORIDE, PRESERVATIVE FREE 40 ML: 5 INJECTION INTRAVENOUS at 00:42

## 2022-07-06 RX ADMIN — Medication 150 MCG/HR: at 16:33

## 2022-07-06 ASSESSMENT — PULMONARY FUNCTION TESTS
PIF_VALUE: 27
PIF_VALUE: 31
PIF_VALUE: 29
PIF_VALUE: 26
PIF_VALUE: 29
PIF_VALUE: 25
PIF_VALUE: 27
PIF_VALUE: 27
PIF_VALUE: 26
PIF_VALUE: 28
PIF_VALUE: 25
PIF_VALUE: 50
PIF_VALUE: 26
PIF_VALUE: 24
PIF_VALUE: 25
PIF_VALUE: 28
PIF_VALUE: 26
PIF_VALUE: 26
PIF_VALUE: 28
PIF_VALUE: 26
PIF_VALUE: 25
PIF_VALUE: 27
PIF_VALUE: 27
PIF_VALUE: 26
PIF_VALUE: 25
PIF_VALUE: 25
PIF_VALUE: 26
PIF_VALUE: 27
PIF_VALUE: 26
PIF_VALUE: 37
PIF_VALUE: 26

## 2022-07-06 NOTE — CONSULTS
Inpatient Cardiology Consultation      Reason for Consult:  Acute HFpEF, Hx Afib s/p cardioversion April and May 2022, digoxin toxicity, known to service    Consulting Physician: Dr Bladimir Morales     Requesting Physician:  Ranulfo Alejandra MD    Date of Consultation: 7/6/2022    HISTORY OF PRESENT ILLNESS:   Patient is a 21-year-old female who is known to Dr. Blaidmir Morales. · She was last seen inpatient 5/13/2022 for A. fib with RVR. DCCV was performed with successful cardioversion to the NSR/bradycardia had lowest in the 40s. Patient was supposed to be discharged with O AT. PMHx: CAD ([facility] 1/2022, [place]out: LAD proximal and mid vessel 50%. 60% proximal.  LCx 50% ostial narrowing of AV groove branch. LVEF 75%. Elevated left ventricular end-diastolic pressure.), PAF with DCCV 5/2022 on Eliquis, HFpEF (PAT 4/2022: EF 60%. NWMA. Mild MR. Mild TR.  RVSP 32 mmHg), hypertension, hyperlipidemia, insulin requiring type 2 diabetes, chronic iron deficiency anemia, CKD, SERENA noncompliant with CPAP, history of right-sided breast cancer s/p lumpectomy with radiation, asthma, Parkinson's disease, history MRSA so 2012, morbid obesity 44.92 BMI, history of gastric ulcers 2013, anxiety/depression. HPI:  · Patient presented to ER 7/5/2022 for AMS reported by family with recent psych med changes and right toe amputation. The patient was found to be likely in shock 2/2 foot wound and hypoxic 88% on room air. Patient also was found to have ARF and a toxic digoxin level when she was given Digibind. The patient had a central line placed and vasopressors were started for hypotension. Patient was admitted to ICU. Patient thought to be cardiogenic versus septic shock at this time. Patient also with acute pancreatitis. Patient with new onset seizures while in ICU and neurology consulted. Blood, urine, respiratory cultures sent. Patient was placed on ventilator.   · Labs: Potassium 5.5>4.6, chloride 93, CO2 19, >105, creatinine 3.2>3.1 (4/2020 1.2-1.5), anion gap 22, GFR 15, magnesium 2.0, proBNP 41,368 (5/2022--2369), HScTNT 57>53>49 (5/2022--20), CRP 5.0, glucose 108, calcium serum 6.9, phosphorus 6.2, total protein 5.1, albumin 2.8 (6/2022-x3. 9), ammonia 61, TSH 4.580, T4 free 0.97, digoxin level 3.7>2.0, WBC 7.6, H&H 10.2>9.0/28.6 (Hgb 5/2022 9.6), platelet 80 (new thrombocytopenia 6/2022 187). · ABG: pH 7.472, PCO2 30, PO2 158, HCO3 21.4, O2 sat 99.2. · CXR: Cardiomegaly with right pleural effusion. · CT head: Negative  · CT chest: Bilateral lung infiltrates with greatest consolidation right lower lobe probably secondary to atelectasis and/or pneumonia. Moderate right pleural effusion. Cardiomegaly and small pericardial effusion. Pericardial effusion is new. Small volume right upper abdominal ascites along with anasarca. This is new. · CT abdomen: Increasing fluid within the abdomen when compared to prior study. Anasarca is increased. Mild stranding around the mesentery. Increased grossly unremarkable. · Upon assessment today 7/6/2022 patient is intubated/sedated lying semi alicia in hospital bed. She is currently on Levophed, Fentanyl, Versed, and Vancomycin gtts. The patient has an arterial line, CVC, OG tube, and genao cath in place. She is currently afib RVR with HR between 104-110. Patient is being followed by nephrology and neurology. VS today 99.7, 16 respirations, 105 pulse, 97/58, 100% with 50% FiO2 ventilator. Please note: past medical records were reviewed per electronic medical record (EMR) - see detailed reports under Past Medical/ Surgical History. Past Medical History:    1. 08/10/11 [address] [place] [address]):  Normal Lexiscan portion.  No evidence for inducible ischemia.  No previous myocardial infarction.  Ejection fraction 77%. 2. Lexiscan MPS 04/25/2014: Reversible inferolateral wall perfusion defect.  Finding suggests left ventricular myocardium at risk for stress-induced ischemia. EF >70%. 3. TTE 04/27/2014 (Dr. Lam Standard left ventricle size and systolic function. No wall motion abnormalities. Mild concentric left ventricular hypertrophy. Ejection fraction is visually estimated at >55%. Normal right ventricular size and function. Aortic root is sclerotic and calcified  4. Cardiac Catheterization (Dr. Enid Rubio) 04/28/2014: Left main: Doc Deeds left main artery is a short vessel which did not appear to have any significant angiographic disease. Left anterior descending:  The left anterior descending artery is a moderate-sized vessel which has minor luminal irregularities in its middle segment but without any significant angiographic luminal narrowing.  The diagonal branches also did not appear to have any significant disease. Left circumflex:  The left circumflex is a large, codominant vessel which did not appear to have any significant angiographic disease. Right coronary artery:  The right coronary artery is a moderate-sized codominant vessel which did not appear to have any significant angiographic disease. LEFT VENTRICULOGRAPHY:  The left ventricle is normal in size and contractility with an estimated ejection fraction of 60% to 65%.  There was no mitral regurgitation. RIGHT FEMORAL ARTERY ANGIOGRAPHY:  The right common femoral artery and the proximal segments of the right superficial femoral artery and the right profunda did not appear to have any significant disease.   5. TTE 02/07/2021 (Dr. Hetal Arrington left ventricle size and systolic function. Ejection fraction is visually estimated at 65-70%. No regional wall motion abnormalities seen. Moderate concentric left ventricular hypertrophy. Normal right ventricular size and function. No systolic mitral regurgitation noted. No hemodynamically significant aortic stenosis is present. Unable to estimate PA systolic pressure. No evidence for hemodynamically significant pericardial effusion.  Valves were not well visualized, please consider PAT if clinical suspicion for endocarditis is high  6. Lexiscan MPS 01/10/2022: ECG during the infusion did not change. The myocardial perfusion imaging was abnormal. The abnormality was a large sized, moderate fixed defect involving the inferior and inferolateral wall suggestive prior infarct without any reversibility. There was also a small sized mild perfusion defect involving the mid to distal anterior wall suggestive ischemia. Overall left ventricular systolic function was normal without regional wall motion abnormalities. No transient ischemic dilatation. High risk general pharmacologic stress test.  7. Cardiac catheterization 1/11/2022: CONCLUSIONS:  Coronary artery disease: Left main.  No significant disease. LAD. Esme Kirstin calcified proximal and mid vessel with 50% mid vessel stenosis.  60% proximal stenosis of a moderate size first diagonal branch.  LCX.  50% ostial narrowing of the AV groove branch in the mid vessel which is a bifurcation lesion with a large marginal branch which itself did not appear to have any significant disease.  The AV groove branch of the left circumflex continues to provide a posterior descending artery branch and the posterolateral branch (codominant vessel). RCA.  Codominant vessel with no significant angiographic disease. Normal left ventricular size with hyperdynamic left ventricular systolic function with an estimated ejection fraction of 75%. Systemic hypertension.  Elevated left ventricular end-diastolic [id number] (97)  8.   Echocardiogram 1/11/2022:  Left ventricle is normal in size.  Moderate concentric left ventricular hypertrophy. No regional wall motion abnormalities seen.  Ejection fraction is visually estimated at 70+/-5%.  There is doppler evidence of stage I diastolic dysfunction.  Mildly dilated right ventricle.  Right ventricle global systolic function is normal ( TAPSE = 1.8 ). Normal size atria.  No significant valvular abnormalities noted.   9. Morbid obesity.    10. Diabetes mellitus.    11. Hyperlipidemia. 12. Hypertension.    13. Asthma.    14. SERENA with noncompliance with Cpap  15. Stomach ulcers.  10/29/13, status post EGD and total colonoscopy (Dr. Norm Pace):  Normal esophagus, normal stomach, normal duodenum.  Colonoscopy was completel normal to the ileocecal valve  16. Anxiety   17. Status post bilateral breast reduction surgery.    18. Right breast cancer, status post right lumpectomy with radiation therapy.   19. Status post tonsillectomy and adenoidectomy.    20. Status post tubal ligation. 21. Status post right tube and ovary surgical removal.  22. Status post cholecystectomy.    23. Status post surgical removal of adhesions.   24. Status post excision of sebaceous cyst from abdomen.    25. Status post  release of hammertoes bilaterally.   26. Status post lumbar laminectomy for herniated L4-L5 disk. 27. 08/03/13, bone biopsy:  Ulcerations with retrograde joint contracture of the proximal interphalangeal joint, 2nd digit of right and 3rd digit of left.  Status post deep soft tissue cultures with known biopsies and correction of deformity of the 2nd digit of the right and 3rd digit of left (Dr. Precious Dale).     28. Iron-deficiency anemia.   29. Admitted in March 2022 with asthma exacerbation   30. Admitted in April 2022 with mechanical fall and rapid atrial fibrillation. She was started on Eliquis, beta blocker was titrated and she had successful PAT guided cardioversion. 32. PAT 4/18/22: Normal left ventricle size and systolic function.  Ejection fraction is visually estimated at 60%.  No regional wall motion abnormalities seen.  Moderately dilated left atrium.  No evidence of thrombus within left atrium or appendage.  Agitated saline injected for shunt evaluation. No evidence of patent foramen ovale on bubble study.  NAIF emptying velocity 18 cm/sec. Normal right ventricular size and function. Mild mitral regurgitation is present.  No hemodynamically significant aortic stenosis is present. Mild tricuspid regurgitation.  RVSP is 32 mmHg. Normal estimated PA systolic pressure.  No evidence for hemodynamically significant pericardial effusion    Medications Prior to admit:  Prior to Admission medications    Medication Sig Start Date End Date Taking? Authorizing Provider   venlafaxine (EFFEXOR XR) 75 MG extended release capsule Take 1 capsule by mouth daily 5/18/22   General Donavon MD   aspirin EC 81 MG EC tablet Take 1 tablet by mouth daily 5/17/22   General Donavon MD   melatonin 3 MG TABS tablet Take 1 tablet by mouth daily 5/17/22   General Donavon MD   insulin glargine (LANTUS) 100 UNIT/ML injection vial Inject 10 Units into the skin nightly 5/17/22   General Donavon MD   apixaban (ELIQUIS) 5 MG TABS tablet Take 1 tablet by mouth 2 times daily 4/19/22   Chin Moeller MD   lidocaine 4 % external patch Place 1 patch onto the skin daily Leave on for 12 hrs. Take off for 12 hrs. 4/20/22   Chin Moeller MD   budesonide-formoterol [facility]) 160-4.5 MCG/ACT AERO Inhale 2 puffs into the lungs 2 times daily 3/19/22   Saurav Bhatti, DO   ipratropium-albuterol (DUONEB) 0.5-2.5 (3) MG/3ML SOLN nebulizer solution Inhale 3 mLs into the lungs every 4 hours as needed for Shortness of Breath 3/19/22   Mahesh Lugo, DO   albuterol sulfate HFA (PROVENTIL HFA) 108 (90 Base) MCG/ACT inhaler Inhale 2 puffs into the lungs every 6 hours as needed for Wheezing 3/19/22   Eulalio Lugo,    rOPINIRole (REQUIP) 1 MG tablet Take 1 tablet by mouth 3 times daily 2/8/22   Olam End, APRN - CNP   carbidopa-levodopa (SINEMET CR)  MG per extended release tablet Take 1 tablet by mouth 3 times daily 1/26/22   Olam End, APRN - CNP   nitroGLYCERIN (NITROSTAT) 0.4 MG SL tablet up to max of 3 total doses. If no relief after 1 dose, call 911.   Patient not taking: Reported on 3/17/2022 1/11/22   MD Galileo Alford mg, 81 mg, Oral, Daily  polyethylene glycol (GLYCOLAX) packet 17 g, 17 g, Oral, BID  lactulose (CHRONULAC) 10 GM/15ML solution 20 g, 20 g, Oral, TID  vancomycin (VANCOCIN) 500 mg in dextrose 5 % 100 mL IVPB, 500 mg, IntraVENous, Once  norepinephrine (LEVOPHED) 16 mg in dextrose 5% 250 mL infusion, 1-100 mcg/min, IntraVENous, Continuous  apixaban (ELIQUIS) tablet 5 mg, 5 mg, Oral, BID  atorvastatin (LIPITOR) tablet 20 mg, 20 mg, Oral, Daily  sodium chloride flush 0.9 % injection 5-40 mL, 5-40 mL, IntraVENous, 2 times per day  sodium chloride flush 0.9 % injection 5-40 mL, 5-40 mL, IntraVENous, PRN  0.9 % sodium chloride infusion, , IntraVENous, PRN  acetaminophen (TYLENOL) tablet 650 mg, 650 mg, Oral, Q6H PRN **OR** acetaminophen (TYLENOL) suppository 650 mg, 650 mg, Rectal, Q6H PRN  piperacillin-tazobactam (ZOSYN) 4,500 mg in dextrose 5 % 100 mL IVPB (Vaio1Gyq), 4,500 mg, IntraVENous, Q12H  glucose chewable tablet 16 g, 4 tablet, Oral, PRN  dextrose bolus 10% 125 mL, 125 mL, IntraVENous, PRN **OR** dextrose bolus 10% 250 mL, 250 mL, IntraVENous, PRN  glucagon (rDNA) injection 1 mg, 1 mg, IntraMUSCular, PRN  dextrose 5 % solution, 100 mL/hr, IntraVENous, PRN    Allergies:  Ciprofloxacin and Codeine    Social History: Unable to obtain as patient is currently sedated and intubated. Family History:   Family History   Problem Relation Age of Onset    Cancer Mother         Lung Ca    Cancer Father         lung Ca    Diabetes Sister     Heart Disease Sister     Seizures Son     Other Brother         sepsis         REVIEW OF SYSTEMS:   Unable to review ROS as patient is intubated and sedated. PHYSICAL EXAM:   /60   Pulse (!) 116   Temp 99.9 °F (37.7 °C) (Bladder)   Resp 14   Ht 5' (1.524 m)   Wt 230 lb (104.3 kg)   SpO2 100%   BMI 44.92 kg/m²   CONST:  Critically ill appearing 66year old  female  who appears stated age. Currently sedated and intubated.    HEENT:   Head- Normocephalic, 07/06/22  0241 07/06/22  0634   PHOS 6.7* 6.2*     TSH:   Recent Labs     07/06/22 0241   TSH 4.580*     HgA1c:   Lab Results   Component Value Date    LABA1C 6.5 (H) 05/12/2022     PT/INR:   Recent Labs     07/05/22  1254   PROTIME 33.7*   INR 3.1     APTT:  Recent Labs     07/05/22  1254   APTT 42.9*     FASTING LIPID PANEL:  Lab Results   Component Value Date/Time    CHOL 95 05/12/2022 01:44 AM    HDL 38 05/12/2022 01:44 AM    LDLCALC 38 05/12/2022 01:44 AM    TRIG 93 05/12/2022 01:44 AM     LIVER PROFILE:  Recent Labs     07/06/22  0241 07/06/22  0634   AST 14 14   ALT 8 9   LABALBU 3.1* 2.8*     Results for Cinthya Birch (MRN [id number]) as of 7/6/2022 13:07   Ref. Range 7/5/2022 12:54   Digoxin Lvl Latest Ref Range: 0.8 - 2.0 ng/mL 3.7 [facility])     Results for Cinthya Birch (MRN [id number]) as of 7/6/2022 13:07   Ref. Range 7/6/2022 06:34   Digoxin Lvl Latest Ref Range: 0.8 - 2.0 ng/mL 2.0     Results for Cinthya Birch (MRN [id number]) as of 7/6/2022 13:07   Ref. Range 7/5/2022 12:54 7/5/2022 13:50 7/5/2022 16:00 7/5/2022 17:17   Troponin, High Sensitivity Latest Ref Range: 0 - 9 ng/L 57 (H)   53 (H)       Results for Cinthya Birch (MRN [id number]) as of 7/6/2022 13:07   Ref. Range 7/6/2022 02:41   Pro-BNP Latest Ref Range: 0 - 125 pg/mL 41,368 (H)   Troponin, High Sensitivity Latest Ref Range: 0 - 9 ng/L 49 (H)         Assessment and plan to follow as per Dr Lorna Sandoval. Electronically signed by RASHARD Agudelo CNP on 7/6/2022 at 11:35 AM     I personally and independently saw and examined patient and reviewed all done pertinent laboratory data, imaging studies, ECGs and rhythm strips. I have reviewed and agree with the APN history and physical exam as documented in the above note. Electronically signed by Kael Carver MD on 7/6/2022 at 2:29 PM     We were asked to see the patient for Afib with RVR and for h/o HFpEF     IMPRESSION:  1. AF with RVR, recurrent.  Known history of PAF s/p DCCV in 4/2022 and 5/2022. [facility] on Eliquis. 2. Hx of HFpEF. Suspect anasarca and pleural effusion contributed to by  SHANTANU on CKD and hypoalbuminemia. 3. Acute hypoxic respiratory failure, intubated on mechanical ventilation. 4. Reported acute mental status change on presentation and seizures. Currently sedated and non-responsive. 5. Hypotension on Levophed. Hx of hypertension. 6. SHANTANU on top of CKD   7. Digoxin toxicity on presentation treated with Digibind. 8. Chronic anemia  9. Thrombocytopenia. 10. Hypoalbuminemia  11. Elevated ammonia level. 12. Elevated lipase with negative CT abdomen for pancreatitis. 13. Mild to Moderate CAD on [facility] 1/2022, clinically stable   14. Mild MR and mild TR on TTE 4/2022 (with EF 60%)  15. SERENA, not compliant with CPAP  16. Asthma  17. History of tobacco abuse  18. Morbid obesity 44.92   19. Hx of HLD  20. T2DM, insulin requiring. 21. Hx of Gastric ulcers in 2013  22. Hx of right sided breast CA s/p lumpectomy and radiation therapy. 23. Parkinson's disease. 24. Anxiety/Depression      PLAN:   1. Continue current management per ICU team +- other consultants. 2. Fluid management per nephrology  3. Taper Levophed as tolerated  4. Start low dose lopressor after disconinting Levophed as BP allows. 5. Start amiodarone  6.  Cardiology will see PRN, please call if needed.       Electronically signed by Alejandra Vicente MD on 7/6/2022 at 1:29 PM

## 2022-07-06 NOTE — ED NOTES
Updates called to [facility]; taken to CT with RN and respiratory at this time     Mindy Queen RN  07/05/22 6906

## 2022-07-06 NOTE — ED NOTES
ABG drawn from right radial in one attempt. Patient tolerated well. RT notified ABG drawn.       Marcus Styles RN  07/05/22 0126

## 2022-07-06 NOTE — PROCEDURES
EEG Report  Beata Mckenzie is a 66 y.o. female      Appointment Date 7/6/2022 Appointment Time  0900   Facility Location [facility] EEG Number    Type of Study Routine Floor 4422-A     Technical Specifications  Technician [facility] of consciousness Comatose   Sleep deprived? NO   Hyperventilation tested? NO   Photic stim tested? NO   EEG recording Standard 10-20 electrode placement    Duration of recording 00:30:00   EEG complete? YES       Clinical History  69-year old female with a past medical history of hypertension, A. fib, CAD, asthma, HFpEF, CKD, SERENA, hyperlipidemia, type II DM, anxiety/depression, Parkinson's disease, restless leg syndrome who presented in the ED for altered mental status.     Medications    Current Facility-Administered Medications:     fentaNYL 10 mcg/ml in 0.9%  ml infusion,  mcg/hr, IntraVENous, Continuous, Archana Jordan MD, Last Rate: 15 mL/hr at 07/06/22 0803, 150 mcg/hr at 07/06/22 0803    hydrocortisone sodium succinate PF (SOLU-CORTEF) injection 100 mg, 100 mg, IntraVENous, Q8H, Archana Jordan MD, 100 mg at 07/06/22 0407    perflutren lipid microspheres (DEFINITY) injection 1.65 mg, 1.5 mL, IntraVENous, ONCE PRN, Archana Jordan MD    chlorhexidine (PERIDEX) 0.12 % solution 15 mL, 15 mL, Mouth/Throat, BID, Archana Jordan MD, 15 mL at 07/06/22 0802    pantoprazole (PROTONIX) 40 mg in sodium chloride (PF) 10 mL injection, 40 mg, IntraVENous, Daily, Archana Jordan MD, 40 mg at 07/06/22 0802    ipratropium-albuterol (DUONEB) nebulizer solution 1 ampule, 1 ampule, Inhalation, Q4H WA, Archana Jordan MD, 1 ampule at 07/06/22 7323    midazolam (VERSED) 1 mg/mL in D5W infusion, 1-10 mg/hr, IntraVENous, Continuous, Kristi Xie MD, Last Rate: 2 mL/hr at 07/06/22 0702, 2 mg/hr at 07/06/22 0702    vancomycin (VANCOCIN) intermittent dosing (placeholder), , Other, RX Placeholder, [employer] Bibi Bell MD    sodium chloride flush 0.9 % injection 5-40 mL, 5-40 mL, IntraVENous, 2 times per day, Birdena Rushing, DO, 10 mL at 07/05/22 2250    sodium chloride flush 0.9 % injection 5-40 mL, 5-40 mL, IntraVENous, PRN, Birdena Rushing, DO    0.9 % sodium chloride infusion, , IntraVENous, PRN, Birdena Rushing, DO    norepinephrine (LEVOPHED) 16 mg in dextrose 5% 250 mL infusion, 1-100 mcg/min, IntraVENous, Continuous, [address], MD, Last Rate: 7.5 mL/hr at 07/06/22 0808, 8 mcg/min at 07/06/22 0808    apixaban (ELIQUIS) tablet 5 mg, 5 mg, Oral, BID, Enmanuel Antonio MD, 5 mg at 07/06/22 0802    aspirin EC tablet 81 mg, 81 mg, Oral, Daily, Enmanuel Antonio MD, 81 mg at 07/06/22 0802    atorvastatin (LIPITOR) tablet 20 mg, 20 mg, Oral, Daily, Enmanuel Antonio MD, 20 mg at 07/06/22 0802    sodium chloride flush 0.9 % injection 5-40 mL, 5-40 mL, IntraVENous, 2 times per day, Enmanuel Antonio MD, 10 mL at 07/06/22 0802    sodium chloride flush 0.9 % injection 5-40 mL, 5-40 mL, IntraVENous, PRN, Enmanuel Antonio MD    0.9 % sodium chloride infusion, , IntraVENous, PRN, Enmanuel Antonio MD    ondansetron (ZOFRAN-ODT) disintegrating tablet 4 mg, 4 mg, Oral, Q8H PRN **OR** ondansetron (ZOFRAN) injection 4 mg, 4 mg, IntraVENous, Q6H PRN, Enmanuel Antonio MD    polyethylene glycol [facility]) packet 17 g, 17 g, Oral, Daily PRN, Enmanuel Antonio MD    acetaminophen (TYLENOL) tablet 650 mg, 650 mg, Oral, Q6H PRN **OR** acetaminophen (TYLENOL) suppository 650 mg, 650 mg, Rectal, Q6H PRN, Enmanuel Antonio MD    piperacillin-tazobactam (ZOSYN) 4,500 mg in dextrose 5 % 100 mL IVPB (Gcyf2Obu), 4,500 mg, IntraVENous, Q12H, Enmanuel Antonio MD, Stopped at 07/06/22 0815    glucose chewable tablet 16 g, 4 tablet, Oral, PRN, Enmanuel Antonio MD    dextrose bolus 10% 125 mL, 125 mL, IntraVENous, PRN **OR** dextrose bolus 10% 250 mL, 250 mL, IntraVENous, PRN, Israel Mercer MD    glucagon (rDNA) injection 1 mg, 1 mg, IntraMUSCular, PRN, Israel Mercer MD    dextrose 5 % solution, 100 mL/hr, IntraVENous, PRN, Israel Mercer MD        Physician Interpretation    General EEG Report  EEG study was performed using the 10-20 electrode placement system in patient who was unresponsive/comatose at time of study. No abnormal behavior or movements noted during the study. Activation procedures included photic stimulation and hyperventilation. Type of EEG:   Routine Inpatient EEG with video done at bedside    Description   Background: Diffuse delta activity with some superimposed theta throughout study. Minimal reactivity noted. Mild increased amplitude. No discernible alpha activity or sleep architecture. No significant fast activity are spike and wave discharges. There was some intermittent nonspecific sharps noted throughout study. Sleep: None    Photic stimulation: Performed  Hyperventilation: Performed      General Impression  Abnormal EEG with note moderate to severe diffuse slowing consistent with moderate to severe encephalopathy. Clinical correlation is indicated.     Galina Jenkins MD    [facility]

## 2022-07-06 NOTE — ED NOTES
Report given to [facility]; at time of giving report this nurse noticed no results for ABGs; updated physician of this finding along with patient current condition of being unresponsive except to painful stimuli and attempted to collect ABG but was unsuccessful requested a second nurse to draw at this time.      Helen Sherman RN  07/05/22 6062       Helen Sherman RN  07/05/22 8850

## 2022-07-06 NOTE — PLAN OF CARE
Problem: Chronic Conditions and Co-morbidities  Goal: Patient's chronic conditions and co-morbidity symptoms are monitored and maintained or improved  Outcome: Progressing     Problem: Discharge Planning  Goal: Discharge to home or other facility with appropriate resources  Outcome: Progressing     Problem: Pain  Goal: Verbalizes/displays adequate comfort level or baseline comfort level  Outcome: Progressing     Problem: Skin/Tissue Integrity  Goal: Absence of new skin breakdown  Description: 1. Monitor for areas of redness and/or skin breakdown  2. Assess vascular access sites hourly  3. Every 4-6 hours minimum:  Change oxygen saturation probe site  4. Every 4-6 hours:  If on nasal continuous positive airway pressure, respiratory therapy assess nares and determine need for appliance change or resting period. Outcome: Progressing     Problem: ABCDS Injury Assessment  Goal: Absence of physical injury  Outcome: Progressing     Problem: Safety - Medical Restraint  Goal: Remains free of injury from restraints (Restraint for Interference with Medical Device)  Description: INTERVENTIONS:  1. Determine that other, less restrictive measures have been tried or would not be effective before applying the restraint  2. Evaluate the patient's condition at the time of restraint application  3. Inform patient/family regarding the reason for restraint  4.  Q2H: Monitor safety, psychosocial status, comfort, nutrition and hydration  Outcome: Progressing  Flowsheets (Taken 7/6/2022 0128)  Remains free of injury from restraints (restraint for interference with medical device): Every 2 hours: Monitor safety, psychosocial status, comfort, nutrition and hydration     Problem: Safety - Adult  Goal: Free from fall injury  Outcome: Progressing

## 2022-07-06 NOTE — PLAN OF CARE
Problem: Chronic Conditions and Co-morbidities  Goal: Patient's chronic conditions and co-morbidity symptoms are monitored and maintained or improved  7/6/2022 0929 by Abdiaziz Davenport RN  Outcome: Progressing     Problem: Pain  Goal: Verbalizes/displays adequate comfort level or baseline comfort level  7/6/2022 0929 by Abdiaziz Davenport RN  Outcome: Progressing     Problem: Skin/Tissue Integrity  Goal: Absence of new skin breakdown  Description: 1. Monitor for areas of redness and/or skin breakdown  2. Assess vascular access sites hourly  3. Every 4-6 hours minimum:  Change oxygen saturation probe site  4. Every 4-6 hours:  If on nasal continuous positive airway pressure, respiratory therapy assess nares and determine need for appliance change or resting period. 7/6/2022 0929 by Abdiaziz Davenport RN  Outcome: Progressing     Problem: ABCDS Injury Assessment  Goal: Absence of physical injury  7/6/2022 0929 by Abdiaziz Davenport RN  Outcome: Progressing  Flowsheets (Taken 7/6/2022 3941)  Absence of Physical Injury: Implement safety measures based on patient assessment     Problem: Safety - Medical Restraint  Goal: Remains free of injury from restraints (Restraint for Interference with Medical Device)  Description: INTERVENTIONS:  1. Determine that other, less restrictive measures have been tried or would not be effective before applying the restraint  2. Evaluate the patient's condition at the time of restraint application  3. Inform patient/family regarding the reason for restraint  4.  Q2H: Monitor safety, psychosocial status, comfort, nutrition and hydration  7/6/2022 0929 by Abdiaziz Davenport RN  Outcome: Progressing  Flowsheets (Taken 7/6/2022 0700)  Remains free of injury from restraints (restraint for interference with medical device): Determine that other, less restrictive measures have been tried or would not be effective before applying the restraint     Problem: Safety - Adult  Goal: Free from fall injury  7/6/2022 1408 by Gerald Kim, RN  Outcome: Progressing  Flowsheets (Taken 7/6/2022 0063)  Free From Fall Injury: Instruct family/caregiver on patient safety

## 2022-07-06 NOTE — PROGRESS NOTES
Stage  CI HR MAP TPRI SVI   Baseline 2.2 494 27 7427 20   Challenge 2.3 479 98 0959 20   Result (%Ä) 2.9 2.3 6.5 3.4 4.9     Not fluid responsive

## 2022-07-06 NOTE — PROGRESS NOTES
[address] [place] [address],[address]  Internal Medicine Department    Attending Physician Statement:  Brent Collado D.O. I have discussed the case, including pertinent history and exam findings with the resident. I agree with the assessment, plan and orders as documented by the resident. Patient here for routine follow up of medical problems. Acute Metabolic Encephalopathy: 2/2 uremia vs infectious vs medication changes vs seizure vs uremia; consider Valproic Acid Induced Encephalopathy 2/2 elevated ammonia level, recent valproic acid use and various other medication changes; continued workup per ICU and neurology consulted; patient had seizure activity this AM;     Hyperammonianemia: with recent addition of valproic acid by outside facility and worsening confusion and VANE requiring intubation consider L-Carnitine administration with use of     SHANTANU Stage III on CKD: nephrology consulted; continue w/u and care per nephrology     Hypoxic Respiratory Failure: currently intubated and sedated per ICU; wean from ventilator as able    Shock: likely combination of sepsis and cardiogenic shock; continue treatment per ICU    Chronic Psychiatric Problems: recently had multiple medications changed at [facility]; appropriate med reconciliation by pharmacy and records from [facility] discharge     Remainder of medical problems as per resident note.

## 2022-07-06 NOTE — PROCEDURES
Intubation Procedure Note    Indication: Respiratory failure and impending respiratory failure    Consent: The family members were counseled regarding the procedure, its indications, risks, potential complications and alternatives, and any questions were answered. Consent was obtained to proceed. Medications Used: None    Procedure: The patient was placed in the appropriate position. Cricoid pressure was not required. Intubation was performed by indirect laryngoscopy using a bronchoscope and a 7.0 cuffed endotracheal tube. The cuff was then inflated and the tube was secured appropriately at a distance of 22 cm to the dental ridge. Initial confirmation of placement included bilateral breath sounds and an end tidal CO2 detector. A chest x-ray to verify correct placement of the tube has been ordered but is still pending. The patient tolerated the procedure well.      Complications: attempt x2 7.5 would not advance past tube, 7-0 tube passed without difficulty

## 2022-07-06 NOTE — CONSULTS
COLON, DIAGNOSTIC  7/19/15    GALLBLADDER SURGERY      LUMBAR LAMINECTOMY      TOE AMPUTATION      TONSILLECTOMY      UPPER GASTROINTESTINAL ENDOSCOPY         Family History   Problem Relation Age of Onset    Cancer Mother         Lung Ca    Cancer Father         lung Ca    Diabetes Sister     Heart Disease Sister     Seizures Son     Other Brother         sepsis        reports that she has never smoked. She has never used smokeless tobacco. She reports that she does not drink alcohol and does not use drugs.     Allergies:  Ciprofloxacin and Codeine    Current Medications:    calcium gluconate 2,000 mg in dextrose 5 % 100 mL IVPB, Once  insulin lispro (HUMALOG) injection vial 0-6 Units, Q4H  insulin glargine-yfgn (SEMGLEE-YFGN) injection vial 5 Units, Nightly  fentaNYL 10 mcg/ml in 0.9%  ml infusion, Continuous  hydrocortisone sodium succinate PF (SOLU-CORTEF) injection 100 mg, Q8H  chlorhexidine (PERIDEX) 0.12 % solution 15 mL, BID  pantoprazole (PROTONIX) 40 mg in sodium chloride (PF) 10 mL injection, Daily  ipratropium-albuterol (DUONEB) nebulizer solution 1 ampule, Q4H WA  midazolam (VERSED) 1 mg/mL in D5W infusion, Continuous  vancomycin (VANCOCIN) intermittent dosing (placeholder), RX Placeholder  aspirin chewable tablet 81 mg, Daily  polyethylene glycol (GLYCOLAX) packet 17 g, BID  amiodarone (CORDARONE) tablet 200 mg, BID  [START ON 7/14/2022] amiodarone (CORDARONE) tablet 200 mg, Daily  norepinephrine (LEVOPHED) 16 mg in dextrose 5% 250 mL infusion, Continuous  [Held by provider] apixaban (ELIQUIS) tablet 5 mg, BID  atorvastatin (LIPITOR) tablet 20 mg, Daily  sodium chloride flush 0.9 % injection 5-40 mL, 2 times per day  sodium chloride flush 0.9 % injection 5-40 mL, PRN  0.9 % sodium chloride infusion, PRN  acetaminophen (TYLENOL) tablet 650 mg, Q6H PRN   Or  acetaminophen (TYLENOL) suppository 650 mg, Q6H PRN  piperacillin-tazobactam (ZOSYN) 4,500 mg in dextrose 5 % 100 mL IVPB ([id number]), Q12H  glucose chewable tablet 16 g, PRN  dextrose bolus 10% 125 mL, PRN   Or  dextrose bolus 10% 250 mL, PRN  glucagon (rDNA) injection 1 mg, PRN  dextrose 5 % solution, PRN        Review of Systems:   Unable to obtain due to clinical conditon . Physical exam:   Vital signs /68   Pulse 90   Temp 98.2 °F (36.8 °C) (Bladder)   Resp 14   Ht 5' (1.524 m)   Wt 231 lb (104.8 kg)   SpO2 100%   BMI 45.11 kg/m²   Gen : moderate distress  Head : at , nc   Neck : supple , no thyromegaly noted . Eyes : EOMI , PERRLA   CV : tachycardiac 1+ edema in LE   Lungs: good flow heard b/l   Abd : soft , NT , BS + , No Organomegaly appreciated . Skin : soft, dry . Neuro : CN  II-XII grossly intact , no focal neurologic deficit . Psych : cooperative .      Data:   Labs:  CBC with Differential:    Lab Results   Component Value Date/Time    WBC 7.6 07/07/2022 04:45 AM    RBC 3.33 07/07/2022 04:45 AM    HGB 8.6 07/07/2022 04:45 AM    HCT 27.3 07/07/2022 04:45 AM    PLT 77 07/07/2022 04:45 AM    MCV 82.0 07/07/2022 04:45 AM    MCH 25.8 07/07/2022 04:45 AM    MCHC 31.5 07/07/2022 04:45 AM    RDW 15.9 07/07/2022 04:45 AM    NRBC 0.0 03/15/2019 09:10 AM    SEGSPCT 74 08/20/2013 10:45 AM    LYMPHOPCT 5.2 07/07/2022 04:45 AM    MONOPCT 1.2 07/07/2022 04:45 AM    MYELOPCT 0.9 03/15/2019 09:10 AM    EOSPCT 1 06/16/2017 12:00 AM    BASOPCT 0.1 07/07/2022 04:45 AM    MONOSABS 0.00 07/07/2022 04:45 AM    LYMPHSABS 0.38 07/07/2022 04:45 AM    EOSABS 0.00 07/07/2022 04:45 AM    BASOSABS 0.00 07/07/2022 04:45 AM     CMP:    Lab Results   Component Value Date/Time     07/07/2022 04:45 AM    K 4.4 07/07/2022 04:45 AM    K 5.0 07/06/2022 02:41 AM    CL 98 07/07/2022 04:45 AM    CO2 21 07/07/2022 04:45 AM     07/07/2022 04:45 AM    CREATININE 2.7 07/07/2022 04:45 AM    GFRAA 21 07/07/2022 04:45 AM    LABGLOM 17 07/07/2022 04:45 AM    GLUCOSE 195 07/07/2022 04:45 AM    PROT 5.1 07/07/2022 04:45 AM    LABALBU 2.8 07/07/2022 04:45 AM    CALCIUM 6.6 07/07/2022 04:45 AM    BILITOT 0.5 07/07/2022 04:45 AM    ALKPHOS 60 07/07/2022 04:45 AM    AST 13 07/07/2022 04:45 AM    ALT 8 07/07/2022 04:45 AM     Ionized Calcium:  No results found for: IONCA  Magnesium:    Lab Results   Component Value Date/Time    MG 2.1 07/07/2022 04:45 AM     Phosphorus:    Lab Results   Component Value Date/Time    PHOS 6.2 07/07/2022 04:45 AM     U/A:    Lab Results   Component Value Date/Time    COLORU Yellow 07/05/2022 05:43 PM    PHUR 5.5 07/05/2022 05:43 PM    WBCUA 1-3 07/05/2022 05:43 PM    RBCUA NONE 07/05/2022 05:43 PM    RBCUA NONE 03/25/2013 09:00 PM    YEAST RARE 10/27/2015 07:18 PM    BACTERIA FEW 07/05/2022 05:43 PM    CLARITYU Clear 07/05/2022 05:43 PM    SPECGRAV 1.025 07/05/2022 05:43 PM    LEUKOCYTESUR Negative 07/05/2022 05:43 PM    UROBILINOGEN 0.2 07/05/2022 05:43 PM    BILIRUBINUR Negative 07/05/2022 05:43 PM    BLOODU Negative 07/05/2022 05:43 PM    GLUCOSEU Negative 07/05/2022 05:43 PM     Microalbumen/Creatinine ratio:  No components found for: RUCREAT  Iron Saturation:  No components found for: PERCENTFE  TIBC:    Lab Results   Component Value Date/Time    TIBC 349 05/12/2022 01:44 AM     FERRITIN:  No results found for: FERRITIN     Imaging:  XR CHEST PORTABLE   Final Result   Adequately positioned right internal jugular central venous line. Otherwise,   no significant change compared to prior exam glued in lines and tubes. US DUP LOWER EXTREMITIES BILATERAL VENOUS   Final Result   Within the visualized vessels there is no evidence for deep venous   thrombosis               XR CHEST PORTABLE   Final Result   1. No significant change. Cardiomegaly with right pleural effusion. 2.  Support tubes remain in appropriate position. CT HEAD WO CONTRAST   Final Result   No acute intracranial abnormality or significant change in the overall   appearance of the brain.  MRI would be useful if symptoms persist. RECOMMENDATIONS:   Unavailable         XR ABDOMEN FOR NG/OG/NE TUBE PLACEMENT   Final Result   Catheter is in the proximal stomach. XR CHEST PORTABLE   Final Result   Adequately positioned endotracheal tube. Nasogastric tube coursing below   diaphragm. Otherwise, stable exam.         CT ABDOMEN PELVIS WO CONTRAST Additional Contrast? None   Final Result   Increasing fluid within the abdomen when compared to the prior study. The   anasarca is also increased in appearance of the prior examination. Mild stranding seen around the mesentery which correlation to clinical lab   values is recommended to exclude possible pancreatitis or volume overload. Overall the appearance of the pancreas itself is grossly unremarkable. There   is only mild stranding seen throughout the mesenteric root. CT CHEST WO CONTRAST   Final Result   Bilateral lung infiltrates with greatest consolidation right lower lobe   probably secondary to atelectasis and or pneumonia. Moderate right pleural effusion. Cardiomegaly and small pericardial effusion. The pericardial effusion is new. Small volume right upper abdominal ascites along with anasarca. This is new. XR CHEST PORTABLE   Final Result   New opacity on the right which may relate to pleural effusion with adjacent   atelectasis and or infiltrate. Cardiomegaly and pulmonary vascular   congestion implying elevated left heart filling pressures. XR CHEST PORTABLE    (Results Pending)   MRI BRAIN WO CONTRAST    (Results Pending)   XR CHEST PORTABLE    (Results Pending)       Assessment    Acute kidney injury :  due to decrease effective in setting of intravascular volume depletion as evident by her need for levophed and her urine lytes with high avidity for cl and Na .   Basically bland urine sediment which make GN/vasculitis unlikely   High BUN in part due to steroids     Continue hemodynamic support  Continue current iv fluids   Check US kidney   Guide Abx per pharmacy dosing         -Encephalopathy metabolic multifactorial : per ICU team     -Digoxin toxicity s/p digbind    -Shock R/O septic shock           Thank you     Electronically signed by Anat Francisco MD on 7/7/2022 at 9:10 AM

## 2022-07-06 NOTE — PROGRESS NOTES
Pharmacy Consultation Note  (Antibiotic Dosing and Monitoring)    Initial consult date: 7/6/22  Consulting physician/provider: Dr. Gabrielle Garcia  Drug: Vancomycin  Indication: Empiric    Age/  Gender Height Weight IBW  Allergy Information   71 y.o./female 5' (152.4 cm) 230 lb (104.3 kg)     Ideal body weight: 45.5 kg (100 lb 4.9 oz)  Adjusted ideal body weight: 69 kg (152 lb 3 oz)   Ciprofloxacin and Codeine      Renal Function:  Recent Labs     07/05/22  1254 07/06/22  0241 07/06/22  0634   BUN 95* 108*  108*  105* 105*   CREATININE 3.3* 3.2*  3.2*  3.1* 3.1*       Intake/Output Summary (Last 24 hours) at 7/6/2022 1102  Last data filed at 7/6/2022 0800  Gross per 24 hour   Intake 1196.66 ml   Output 540 ml   Net 656.66 ml       Vancomycin Monitoring:  Trough:  No results for input(s): VANCOTROUGH in the last 72 hours. Random:  No results for input(s): VANCORANDOM in the last 72 hours. Vancomycin Administration Times:  Recent vancomycin administrations                   vancomycin (VANCOCIN) 2,000 mg in dextrose 5 % 500 mL IVPB (mg) 2,000 mg New Bag 07/05/22 2010                Assessment:  · Patient is a 66 y.o. female who has been initiated on vancomycin  · Estimated Creatinine Clearance: 18 mL/min (A) (based on SCr of 3.1 mg/dL (H)).      Plan:  · Vancomycin 500 mg IV x1 - random tomorrow AM  · Will check vancomycin levels when appropriate  · Will continue to monitor renal function   · Clinical pharmacy to follow      NAVDEEP Butler [facility] 7/6/2022 11:02 AM

## 2022-07-06 NOTE — CARE COORDINATION
7/6:  Transition of care:  Pt presented to the ER for for AMS. Pt had recent changes in psych medications & the right 2nd toe amputation. Pt was intubated & transferred to MICU. Pt remains intubated, sedated on Fentanyl & versed, in bilateral wrist restraints. Pt is on Iv Vanco, Iv Protonix, Iv Solu-cortef, Iv Zosyn, Iv Levophed & PO Eliquis (not new med). 2 D echo & MRI brain needed. Cm left message for son Cole Pendleton at [phone]. Needs are unclear. Will need PT/OT/Speech evals. Sw/CM will continue to follow for dc planning.   Electronically signed by Hope Ly RN on 7/6/2022 at 3:11 PM

## 2022-07-06 NOTE — PROCEDURES
Arterial line placement    Procedure: Right Radial arterial line placement. Indications: Continuous monitoring of blood pressure in a patient with hypotension +/- shock, on Levophed. Anesthesia: Local infiltration of 1% lidocaine. Consent: The family members were counseled regarding the procedure, its indications, risks, potential complications and alternatives, and any questions were answered. Consent was obtained to proceed. Technique: Time Out: Immediately prior to the procedure a \"timeout\" was called to verify the correct patient and procedure. Procedure was done using strict aseptic technique. Jose's test was performed and was normal. Right Radial site was cleaned with chloraprep and draped. Right Radial was identified, then Lidocaine 1% was infiltrated locally. Arterial line was inserted, a good blood flow was obtained, after which guidewire was inserted all the way with no resistance. Then the canula was inserted and needle with guidewire was withdrawn. Pulsatile bright red blood flow was observed. The canula was connected to BP monitoring apparatus and a good quality waveform was noted. Then the canula was secured with 2 stay sutures of 2-0 silk after Lidocaine infiltration, following which dressing was applied. Number of sticks: 3. Number of Kits used: 3. Complications: No immediate complication. Estimated blood loss: About 1 ml. Comment: Patient tolerated the procedure well.      Yesi House MD PGY-2  7/6/2022 1:52 AM

## 2022-07-06 NOTE — PROGRESS NOTES
Pt with witnessed seizure. 4mg Versed given IVP as well as Versed gtt initiated. EEG and nuero consult ordered as well. Attempted to update son, Lyubov Hernandez and sisterRu over no phone but no answer.

## 2022-07-06 NOTE — PROCEDURES
Central Line Insertion     Procedure: right internal jugular vein triple lumen catheter placement. Indications: vascular access    Consent: The family members were counseled regarding the procedure, its indications, risks, potential complications and alternatives, and any questions were answered. Consent was obtained to proceed. Number of sticks: 1    Number of Kits used: 1    Procedure: Time Out: Immediately prior to the procedure a \"timeout\" was called to verify the correct patient and procedure. The patient was place in the trendelenburg position and the skin over the right internal jugular vein was prepped with Chloraprep. Local anesthesia was obtained by infiltration using 1% Lidocaine without epinephrine. With ultrasound guidance a large bore needle was used to identify the vein, dark non pulsatile blood returned. The guide wire was then inserted through the needle with minimal resistance. 2 mm nick was made in the skin beside the guidewire. Then a dilator was inserted and removed. A triple lumen catheter was then inserted into the vessel over the guide wire using the Seldinger technique to the 16 cm zulma. All ports showed good, free flowing blood return and were flushed with saline solution. The catheter was then securely fastened to the skin with sutures and covered with a bio patch and sterile dressing. A post procedure X-ray was ordered and is still pending at this time. Complications: None   The patient tolerated the procedure well. Estimated blood loss: 2 ml. Wilber Smith MD PGY-2  7/6/2022  5:21 PM    I was present to provide direct supervision for the above services.     Maggie Baldwin MD  7/7/2022 8:55 AM

## 2022-07-06 NOTE — ED NOTES
Physician spoke with family regarding patient condition; decision to intubate patient at this time; verbal orders given for etomidate 10mg and raquel 100mg iv push orders repeated back and verified, airway cart, rsi box, and glidescope at bedside.  After 2 attempts patient intubated by physician with a 7.5 tube 22 at the lip; an OG tube was placed by RN 50 at the lip; awaiting CXR for verification of both then ok to go to CT per physician     Miquel Juarez, RN  07/05/22 [id number] [facility], RN  07/05/22 6829

## 2022-07-06 NOTE — PROGRESS NOTES
Chase Moncada is a 66 y.o. right handed woman. Neuro was consulted for new onset seizures    Significant past medical and surgical history: Parkinson's disease, SERENA on CPAP, HLD, HTN, CKD, breast cancer s/p lumpectomy, anxiety, diabetes, lumbar laminectomy  Pertinent family history: Son has seizures  Social: No tobacco, EtOH, or illicit drug    HPI:  She follows with me in the office for Parkinson's disease and is on Sinemet and Requip--with fairly good control of motor symptoms. She presented on 7/5 with an altered mental status and hypotension (84/49), hypoxia, bradycardia (lowest documented was 58) and SHANTANU. She recently had a change in her psychiatric medications and a right toe amputation on 6/17. There was also concern for dig toxicity-- level was 3.7 and she was given Digibind. Imaging also revealed possible pancreatitis and pneumonia. No acute abnormalities on CTH. WBCs were 6.5. Afebrile. She required intubation and pressors. No focal deficits were noted in the ER. This morning around 0730 patient had a witnessed seizure-- with no documented descriptors but was verbally reported as \"generalized tonic-clonic\"-- and was given Versed. She is currently intubated and sedated. BP documented at 0800 was 87/44. BGL this morning at 0630 was 108. When sedation is lowered she reportedly gets very agitated. EEG is at the bedside to perform the test.     There is no family present. Digoxin level has improved. She is tachycardic. Blood pressures are improving. Temp 99.9 this morning.     Unable to complete review of systems due to her intubation and sedation    Allergies   Allergen Reactions    Ciprofloxacin Nausea And Vomiting    Codeine Itching     Creepy crawly \" feelings     Medications:     Current Facility-Administered Medications   Medication Dose Route Frequency Provider Last Rate Last Admin    fentaNYL 10 mcg/ml in 0.9%  ml infusion   mcg/hr IntraVENous Continuous Reji Or Atif Prater MD 15 mL/hr at 07/06/22 0230 150 mcg/hr at 07/06/22 0230    hydrocortisone sodium succinate PF (SOLU-CORTEF) injection 100 mg  100 mg IntraVENous Q8H Enmanuel Antonio MD   100 mg at 07/06/22 0407    perflutren lipid microspheres (DEFINITY) injection 1.65 mg  1.5 mL IntraVENous ONCE PRN Enmanuel Antonio MD        chlorhexidine (PERIDEX) 0.12 % solution 15 mL  15 mL Mouth/Throat BID Enmanuel Antonio MD        pantoprazole (PROTONIX) 40 mg in sodium chloride (PF) 10 mL injection  40 mg IntraVENous Daily Enmanuel Antonio MD        ipratropium-albuterol (DUONEB) nebulizer solution 1 ampule  1 ampule Inhalation Q4H WA Enmanuel Antonio MD        midazolam (VERSED) 1 mg/mL in D5W infusion  1-10 mg/hr IntraVENous Continuous Enmanuel Antonio MD 2 mL/hr at 07/06/22 8591 2 mg/hr at 07/06/22 1268    sodium chloride flush 0.9 % injection 5-40 mL  5-40 mL IntraVENous 2 times per day Birdena Rushing, DO   10 mL at 07/05/22 2250    sodium chloride flush 0.9 % injection 5-40 mL  5-40 mL IntraVENous PRN Birdena Rushing, DO        0.9 % sodium chloride infusion   IntraVENous PRN Birdena Rushing, DO        norepinephrine (LEVOPHED) 16 mg in dextrose 5% 250 mL infusion  1-100 mcg/min IntraVENous Continuous Sanya Cox MD 5.6 mL/hr at 07/06/22 0530 6 mcg/min at 07/06/22 0530    apixaban (ELIQUIS) tablet 5 mg  5 mg Oral BID Enmanule Antonio MD        aspirin EC tablet 81 mg  81 mg Oral Daily Enmanuel Antonio MD        atorvastatin (LIPITOR) tablet 20 mg  20 mg Oral Daily Enmanuel Antonio MD        sodium chloride flush 0.9 % injection 5-40 mL  5-40 mL IntraVENous 2 times per day Enmanuel Antonio MD   40 mL at 07/06/22 0042    sodium chloride flush 0.9 % injection 5-40 mL  5-40 mL IntraVENous PRN Enmanuel Antonio MD        0.9 % sodium chloride infusion   IntraVENous PRN Enmanuel Antonio MD        ondansetron (ZOFRAN-ODT) disintegrating tablet 4 mg  4 mg Oral Q8H PRN Jean Pierre Lambert MD        Or    [facility] injection 4 mg  4 mg IntraVENous Q6H PRN Jean Pierre Lambert MD        polyethylene glycol [facility]) packet 17 g  17 g Oral Daily PRN Jean Pierre Lambert MD        acetaminophen (TYLENOL) tablet 650 mg  650 mg Oral Q6H PRN Jean Pierre Lambert MD        Or    acetaminophen (TYLENOL) suppository 650 mg  650 mg Rectal Q6H PRN Jean Pierre Lambert MD        piperacillin-tazobactam (ZOSYN) 4,500 mg in dextrose 5 % 100 mL IVPB ([id number])  4,500 mg IntraVENous Q12H Jean Pierre Lambert MD 25 mL/hr at 07/06/22 0415 4,500 mg at 07/06/22 0415    glucose chewable tablet 16 g  4 tablet Oral PRN Jean Pierre Lambert MD        dextrose bolus 10% 125 mL  125 mL IntraVENous PRN Jean Pierre Lambert MD        Or    dextrose bolus 10% 250 mL  250 mL IntraVENous PRN Jean Pierre Lambert MD        glucagon (rDNA) injection 1 mg  1 mg IntraMUSCular PRN Jean Pierre Lambert MD        dextrose 5 % solution  100 mL/hr IntraVENous PRN Jean Pierre Lambert MD         Objective:     /74   Pulse (!) 111   Temp 99.5 °F (37.5 °C) (Bladder)   Resp 18   Ht 5' (1.524 m)   Wt 230 lb (104.3 kg)   SpO2 100%   BMI 44.92 kg/m²     General appearance: Eyes closed, intubated, sedated in no acute distress on vent  Head: Normocephalic, atraumatic--ETT in place  Eyes: Sclera clear  Lungs: Breathing over ventilator  Heart: Tachycardic rate and  Extremities: Trace edema at ankles  Skin: No rashes or lesions    Mental Status: Intubated, sedated, nonverbal on vent.     Cranial Nerves:  I: smell    II: visual acuity     II: visual fields    II: pupils  miotic but ERRLA   III,VII: ptosis    III,IV,VI: extraocular muscles   doll's present   V: mastication    V: facial light touch sensation     V,VII: corneal reflex     VII: facial muscle function - upper     VII: facial muscle function - lower  appears symmetric   VIII: hearing  no response to voice at this time   IX: soft palate elevation     IX,X: gag reflex    XI: trapezius strength     XI: sternocleidomastoid strength    XI: neck extension strength      XII: tongue strength       Motor/sensory:  No purposeful movement of any limb  Grimaces and withdraws to deep pain in all limbs  Obese bulk  No cogwheeling  No abnormal movement    DTR:  1+ throughout  No Kohler's  Bilateral Babinski's    Laboratory/Radiology:  ry/Radiology:     Lab Results   Component Value Date    WBC 7.6 07/06/2022    HGB 9.0 (L) 07/06/2022    HCT 28.6 (L) 07/06/2022    MCV 82.9 07/06/2022    PLT 80 (L) 07/06/2022    LYMPHOPCT 8.0 (L) 07/06/2022    RBC 3.45 (L) 07/06/2022    MCH 26.1 07/06/2022    MCHC 31.5 (L) 07/06/2022    RDW 15.9 (H) 07/06/2022     Lab Results   Component Value Date     (L) 07/06/2022     07/06/2022     07/06/2022    K 4.44 07/06/2022    CL 90 (L) 07/06/2022    CL 92 (L) 07/06/2022    CL 93 (L) 07/06/2022    CO2 18 (L) 07/06/2022    CO2 18 (L) 07/06/2022    CO2 18 (L) 07/06/2022     (HH) 07/06/2022     (HH) 07/06/2022     (H) 07/06/2022    CREATININE 3.2 (H) 07/06/2022    CREATININE 3.2 (H) 07/06/2022    CREATININE 3.1 (H) 07/06/2022    GLUCOSE 118 (H) 07/06/2022    GLUCOSE 120 (H) 07/06/2022    GLUCOSE 119 (H) 07/06/2022    CALCIUM 7.1 (L) 07/06/2022    CALCIUM 7.0 (L) 07/06/2022    CALCIUM 6.9 (L) 07/06/2022    PROT 5.5 (L) 07/06/2022    LABALBU 3.1 (L) 07/06/2022    BILITOT 0.7 07/06/2022    ALKPHOS 69 07/06/2022    AST 14 07/06/2022    ALT 8 07/06/2022    LABGLOM 14 07/06/2022    LABGLOM 14 07/06/2022    LABGLOM 15 07/06/2022    GFRAA 17 07/06/2022    GFRAA 17 07/06/2022    GFRAA 18 07/06/2022     Dig 7/6: 2.0    CTH head 7/5: no acute abnormalities    EEG pending    All labs and images personally reviewed today    Assessment:     Probable provoked seizure:  In the setting of respiratory failure, hypotension, dig toxicity, and SHANTANU. With her underlying neurodegenerative disease she is more at risk for epilepsy than the general population and we will get MRI of the brain and follow-up results of EEG.   At this time, her exam is skewed due to her sedation but appears to be nonfocal.    Parkinson's disease: Previously under fairly good control on Sinemet and Requip    Plan:     Await EEG    MRI brain WO (no GABRIELE due to renal fx)    Hold on antiseizure medications for now    Seizure precautions    Will discuss with Dr. Delma Potts    Will follow    RASHARD Hernandez - [place]   7/6/2022

## 2022-07-06 NOTE — CONSULTS
Medical Intensive Care Unit     [facility]  Resident Consult Note    Date and time: 7/6/2022 2:45 AM  Patient's name:  Servando Baron  Medical Record Number: [id number]  Patient's account/billing number: [de-identified]  Patient's YOB: 1950  Age: 66 y.o. Date of Admission: 7/5/2022 12:25 PM  Length of stay during current admission: 1    Primary Care Physician: No primary care provider on file. ICU Attending Physician: Dr. Lew Ohara    Code Status: Full Code    Reason for ICU admission: Shock, septic vs cardiogenic; acute hypoxic hypercapnic respiratory failure    History of Present Illness:   Patient is a 69-year old female with a past medical history of hypertension, A. fib, CAD, asthma, HFpEF, CKD, SERENA, hyperlipidemia, type II DM, anxiety/depression, Parkinson's disease, restless leg syndrome who presented in the ED for altered mental status. She recently had a right second toe amputation back last month at [facility].  She was discharged after 2 weeks. According to the patient's family, she was doing okay until few hours prior to admission, patient was noted to be acting different yesterday morning. She was noted to be drowsy, would easily fall asleep during conversation and that she would not make any sense. She also had medication changes recently pressure in her psych meds. Patient did not have any recent travel, any recent sick contacts. Family brought the patient to the ED. Patient arrived in the ED hypotensive at 84/49 and hypoxic at 88% on room air. She was placed on 5 L via nasal cannula which improved her saturations. Despite fluids, patient remained hypotensive. She also became bradycardic. She was eventually started on Levophed.   Labs were remarkable for K5.5,sCr 2.3, anion gap 15, lactic acid 2.8, troponin 57 -> 53, lipase 148, digoxin noted to be elevated at 3.7, hemoglobin 10.2, platelet 83, INR at 3.1 x-ray showed new opacities on right concerns for pleural effusion versus atelectasis versus infiltrates, as well as pulmonary vascular congestion was seen. CT A/P showed increased anasarca with mild stranding around mesentery concerning for pancreatitis versus volume overload. However pancreatitis looked unremarkable. CT of the chest showed bilateral lung infiltrates more on the right compared to the left, moderate right pleural effusion, cardiomegaly and pericardial effusion, anasarca. EKG did not show any ischemic changes. She received 30 cc/kg bolus, DigiFab, 1 dose of Solu-Medrol, Vanco and Zosyn. While in the ED, ABG was obtained and showed respiratory acidosis. Decision was made to intubate the patient and was then transferred to the ICU for further evaluation and management. She arrived in the unit at 16mcg/h of levophed, intubated.     CURRENT VENTILATION STATUS:   [x] Ventilator  [] BIPAP  [] Nasal Cannula [] Room Air      SECRETIONS   Amount:  [] Small [x] Moderate  [] Large [] None    Color:     [] White [] Colored  [x] Bloody    SEDATION:  RAAS Score:  [] Propofol gtt  [] Versed gtt  [x] Fentanyl gtt  [] No Sedation    PARALYZED:  [x] No    [] Yes    VASOPRESSORS:  [] No    [x] Yes    If yes -   [] Levophed       [] Dopamine     [] Vasopressin       [] Dobutamine  [] Phenylephrine         [] Epinephrine    CENTRAL LINES:     [] No   [x] Yes   (Date of Insertion: 7/5/22  )           If yes -     [] Right IJ     [] Left IJ [x] Right Femoral [] Left Femoral                   [] Right Subclavian [] Left Subclavian     BIGGS'S CATHETER:   [] No   [x] Yes  (Date of Insertion: 7/5/22  )     URINE OUTPUT:            [x] Good   [] Low              [] Anuric    REVIEW OF SYSTEMS:    · Unable to assess as patient is intubated    OBJECTIVE:     VITAL SIGNS:  /74   Pulse 90   Temp (!) 95.7 °F (35.4 °C) (Bladder)   Resp 23   Ht 5' (1.524 m)   Wt 230 lb (104.3 kg)   SpO2 93%   BMI 44.92 kg/m²   Tmax over 24 hours:  Temp (24hrs), Av.8 °F (34.9 °C), Min:92.8 °F (33.8 °C), Max:95.7 °F (35.4 °C)      Patient Vitals for the past 6 hrs:   BP Temp Temp src Pulse Resp SpO2   22 0121 -- -- -- 90 23 --   22 2340 100/74 (!) 95.7 °F (35.4 °C) Bladder 91 20 --   22 2236 (!) 151/91 -- -- 90 15 --   22 2227 (!) 157/94 (!) 95.4 °F (35.2 °C) -- 84 17 93 %   22 2226 (!) 142/99 -- -- 81 15 99 %   22 222 -- -- -- 79 25 (!) 87 %   22 222 (!) 120/97 -- -- 87 (!) 32 100 %   22 -- -- -- (!) 109 25 94 %   22 106/73 -- -- (!) 103 20 99 %   22 (!) 117/54 -- -- 70 22 100 %   22 2100 (!) 117/54 (!) 95.2 °F (35.1 °C) Bladder 74 19 100 %   22 107/64 -- -- 71 19 91 %         Intake/Output Summary (Last 24 hours) at 2022 0245  Last data filed at 2022 0200  Gross per 24 hour   Intake 646.66 ml   Output 75 ml   Net 571.66 ml     Wt Readings from Last 2 Encounters:   22 230 lb (104.3 kg)   22 227 lb 1.6 oz (103 kg)     Body mass index is 44.92 kg/m².       PHYSICAL EXAMINATION:  General appearance - overweight, chronically ill appearing and intubated, sedated  Mental status - sedated but easily arousable, follow commands  Eyes - pupils equal and reactive, extraocular eye movements intact  Ears - right ear normal, left ear normal, hearing grossly normal bilaterally  Nose - normal and patent, no erythema, discharge or polyps  Mouth - mucous membranes moist, pharynx normal without lesions  Neck - supple, no significant adenopathy  Chest - clear to auscultation, no wheezes, rales or rhonchi, symmetric air entry  Heart - normal rate, regular rhythm, normal S1, S2, no murmurs, rubs, clicks or gallops  Abdomen - soft, nontender, nondistended, no masses or organomegaly  Neurological - intubated, sedated, follows simple commands  Extremities - pedal edema 2+, intact peripheral pulses, +toe amputations bilaterally  Skin - normal coloration and turgor, no rashes, no suspicious skin lesions noted      Any additional physical findings:    MEDICATIONS:  Scheduled Meds:   lidocaine PF        sodium chloride flush  5-40 mL IntraVENous 2 times per day    apixaban  5 mg Oral BID    aspirin EC  81 mg Oral Daily    atorvastatin  20 mg Oral Daily    sodium chloride flush  5-40 mL IntraVENous 2 times per day    piperacillin-tazobactam  4,500 mg IntraVENous Q12H     Continuous Infusions:   fentaNYL 50 mcg/hr (07/06/22 0032)    sodium chloride      norepinephrine 16 mcg/min (07/06/22 0005)    sodium chloride      dextrose       PRN Meds:   sodium chloride flush, 5-40 mL, PRN  sodium chloride, , PRN  sodium chloride flush, 5-40 mL, PRN  sodium chloride, , PRN  ondansetron, 4 mg, Q8H PRN   Or  ondansetron, 4 mg, Q6H PRN  polyethylene glycol, 17 g, Daily PRN  acetaminophen, 650 mg, Q6H PRN   Or  acetaminophen, 650 mg, Q6H PRN  glucose, 4 tablet, PRN  dextrose bolus, 125 mL, PRN   Or  dextrose bolus, 250 mL, PRN  glucagon (rDNA), 1 mg, PRN  dextrose, 100 mL/hr, PRN        VENT SETTINGS (Comprehensive) (if applicable):  Vent Information  Ventilator ID: [id number]  Vent Mode: AC/VC  Additional Respiratory Assessments  Heart Rate: 90  Resp: 23  SpO2: 93 %  Position: Semi-Bob's  Humidification Source: Heated wire  Humidification Temp: 37    ABGs:   Recent Labs     07/05/22  2150   PH 7.109*   PCO2 77.8*   PO2 174.6*   HCO3 24.1   BE -6.6*   O2SAT 99.0*       Laboratory findings:  Complete Blood Count:   Recent Labs     07/05/22  1254   WBC 6.5   HGB 10.2*   HCT 34.5   PLT 83*        Last 3 Blood Glucose:   Recent Labs     07/05/22  1254   GLUCOSE 89        PT/INR:    Lab Results   Component Value Date/Time    PROTIME 33.7 07/05/2022 12:54 PM    INR 3.1 07/05/2022 12:54 PM     PTT:    Lab Results   Component Value Date/Time    APTT 42.9 07/05/2022 12:54 PM       Comprehensive Metabolic Profile:   Recent Labs     07/05/22  1254      K 5.5*   CL 92*   CO2 26   BUN 95*   CREATININE 3.3* GLUCOSE 89   CALCIUM 7.7*   PROT 6.2*   LABALBU 3.6   BILITOT 0.5   ALKPHOS 81   AST 15   ALT 9      Magnesium:   Lab Results   Component Value Date/Time    MG 2.0 05/16/2022 05:01 AM     Phosphorus:   Lab Results   Component Value Date/Time    PHOS 2.7 03/19/2022 04:13 AM     Ionized Calcium: No results found for: CAION   Troponin: No results for input(s): TROPONINI in the last 72 hours. Radiology/Imaging:   CT HEAD WO CONTRAST   Final Result   No acute intracranial abnormality or significant change in the overall   appearance of the brain. MRI would be useful if symptoms persist.      RECOMMENDATIONS:   Unavailable         XR ABDOMEN FOR NG/OG/NE TUBE PLACEMENT   Final Result   Catheter is in the proximal stomach. XR CHEST PORTABLE   Final Result   Adequately positioned endotracheal tube. Nasogastric tube coursing below   diaphragm. Otherwise, stable exam.         CT ABDOMEN PELVIS WO CONTRAST Additional Contrast? None   Final Result   Increasing fluid within the abdomen when compared to the prior study. The   anasarca is also increased in appearance of the prior examination. Mild stranding seen around the mesentery which correlation to clinical lab   values is recommended to exclude possible pancreatitis or volume overload. Overall the appearance of the pancreas itself is grossly unremarkable. There   is only mild stranding seen throughout the mesenteric root. CT CHEST WO CONTRAST   Final Result   Bilateral lung infiltrates with greatest consolidation right lower lobe   probably secondary to atelectasis and or pneumonia. Moderate right pleural effusion. Cardiomegaly and small pericardial effusion. The pericardial effusion is new. Small volume right upper abdominal ascites along with anasarca. This is new. XR CHEST PORTABLE   Final Result   New opacity on the right which may relate to pleural effusion with adjacent   atelectasis and or infiltrate. Cardiomegaly and pulmonary vascular   congestion implying elevated left heart filling pressures. US DUP LOWER EXTREMITIES BILATERAL VENOUS    (Results Pending)   XR CHEST PORTABLE    (Results Pending)   XR CHEST PORTABLE    (Results Pending)         ASSESSMENT  And PLAN:      1. Acute encephalopathy, multifactorial 2/2 shock, digoxin toxicity, uremia; r/o other causes  · Came in for AMS with hypotension not improved with fluids  · Dig level 3.7  · BUN 95 on presentation  · CT head negative for any acute abnormality  2. Shock, likely septic (unknown etiology, likely acute pancreatitis vs R toe wound vs others) vs cardiogenic (pericardial effusion)  · Concerns for septic shock; although no WBC or fever but pt presented with hypotension, AMS; Procal 0.22, unclear source of infection, concerns for acute pancreatitis vs R toe wound; qSOFA: 2 (high risk)   · Concerns for cardiogenic; small pericardial effusion seen on imaging, possible acute exacerbation of HF?  3. Elevated troponin likely 2/2 demand ischemia -troponin 57 -> 53; EKG showed no ischemic changes  4. Atrial fibrillation, on eliquis 5mg BID and metoprolol 50mg BID  5. Acute hypoxic hypercapnic respiratory failure 2/2 pleural effusion vs pericardial effusion vs acute HFpEF exacerbation; r/o PE  · Pleural effusion as well as small pericardial effusion seen on imaging  · proBNP 41,368  6. Acute pancreatitis; lipase 148; CT A/P showed mild stranding around the mesentery concerning for pancreatitis versus volume overload, although overall appearance of pancreas itself is grossly unremarkable. 7. SHANTANU Stage III on CKD - baseline Crea 0.8-0.9, sCr 3-3 on presentation; FEUrea: 6.5% -> prerenal likely 2/2 cardiorenal syndrome  8. Hyperkalemia - resolved; K 5.5 -> 4.9  9. HAGMA 2/2 lactic acidosis, uremia AG 15 -> 23; lactic acid and uremia elevated  10. Elevated INR  11. Hx of Type 2 DM with neuropathy, on sliding scale at home  12.  Hx of asthma, on montelukast 10mg daily  13. Hx of SERENA  14. Hx of HTN  15. Hx of CAD  16. Hx of HFpEF (EF  60% on echo in 4/2022); on furosemide 20mg daily and metoprolol 50mg BID  17. Hx of hypothyroidism   18. Hx of depression/anxiety, on seroquel 25mg in AM and 50mg in PM; divalproex 250mg BID  19. Hx of restless leg syndrome, on ropinirole 1mg TID at home  20. Hx of Parkinson's disease, was previously on carbi-dopa    Plan:  · Monitor mentation  · Follow TSH, Vit B12, folate, ammonia, UDS, SDS, cortisol levels  · Follow pancultures  · Wean levophed as tolerated  · Obtain NICOM  · Start hydrocortisone 100 mg 3 times daily  · Vancomycin and Zosyn  · Obtain Echo  · Trend troponin  · Repeat EKG  · Hold Toprol and Lasix  · Continue lasix, monitor Hgb and sign for bleeding  · Daily ABG and CXR  · Breathing treatment with albuterol nebulization  · Hypoglycemia protocol in place  · POCT glucose every 4 hours  · Holding other meds for now    WEAN PER PROTOCOL:  [] No   [x] Yes  [] N/A    ICU PROPHYLAXIS:  Stress ulcer:  [x] PPI Agent  [] [id number] [] Sucralfate  [] Other:  VTE:   [] Enoxaparin  [] Unfract. Heparin Subcut  [] EPC Cuffs    NUTRITION:  [x] NPO [] Tube Feeding (Specify: ) [] TPN  [] PO (Diet: Diet NPO)    INSULIN DRIP:   [x] No   [] Yes    CONSULTATION NEEDED:   [x] No   [] Yes    FAMILY UPDATED:    [] No   [x] Yes    TRANSFER OUT OF ICU:   [x] No   [] Yes    Gerry Tomas MD, PGY-2  Attending Physician: Dr. Heidi Berman  7/6/2022, 2:45 AM    I personally saw, examined and provided care for the patient. Radiographs, labs and medication list were reviewed by me independently. I spoke with bedside nursing, therapists and consultants. Critical care services and times documented are independent of procedures and multidisciplinary rounds with Residents. Additionally comprehensive, multidisciplinary rounds were conducted with the MICU team. The case was discussed in detail and plans for care were established.  Review of Residents documentation was conducted and revisions were made as appropriate. I agree with the above documented exam, problem list and plan of care with the following additions:    Complex medical history admitted with altered mentation, hypotension, acute respiratory failure requiring intubation, and 1 seizure episode. She was found to have bradycardia, hyperkalemia, and elevated digoxin level s/p Digibind. We are planning on treating her for septic shock, possible pneumonia. She will be maintained on broad spectrum antibiotics, vasopressor support. Her vent was adjusted. US of the R pleural space reveals a moderate R effusion. Hold eliquis. Bedside vs IR pigtail over the next several days. Nephrology was consulted for acute on chronic kidney injury. EEG pending. Discussed with sons at the bedside. 40 minutes of CCT spent with the patient, reviewing the chart including imaging studies, and discussing the case with other health care professionals. This time excludes procedures.      Roxana Ness MD

## 2022-07-06 NOTE — DISCHARGE INSTRUCTIONS
My Personal Risk Factors  Anyone can get sepsis, but some people are at higher risk than others. These risk factors include: Over the age of 72, Immunocompromised (history of cancer/taking immunosuppressants), Chronic illness (diabetes, COPD, CHF, chronic renal failure,liver disease), Trauma/burns/open wounds and History of infection or sepsis  My Prevention Measures  The best way to prevent sepsis is to prevent infections. You can lower your risk by:  Getting vaccinations and immunizations, Practicing good hand hygiene. Use soap and water or hand  especially before handling catheters, Finishing all your antibiotics if you have an infection, Calling your doctor if you have signs and symptoms of an infection, Controlling your blood sugar, Taking all of your medications as prescribed and For females, wiping from front to back after voiding         Did your nurse/physician explain or give you educational material on sepsis while you were in the hospital?  {YES/NO:47225}    Can you tell me why you are at risk for getting sepsis again? {YES/NO:22363}    What signs and symptoms would you report to your physician? {YES/NO:11351}    Would like to speak with our sepsis education nurse? {YES/NO:64708      Verbalized understanding via teach back. {Persons; patient/family/POA:99503}         Gastritis: Care Instructions  Your Care Instructions     Gastritis is a sore and upset stomach. It happens when something irritates the stomach lining. Many things can cause it. These include an infection such as the flu or something you ate or drank. Medicines or a sore on the lining of the stomach (ulcer) also can cause it. Your belly may bloat and ache. You maybelch, vomit, and feel sick to your stomach. You should be able to relieve the problem by taking medicine. And it may helpto change your diet. If gastritis lasts, your doctor may prescribe medicine. Follow-up care is a key part of your treatment and safety.  Be sure to make and go to all appointments, and call your doctor if you are having problems. It's also a good idea to know your test results and keep alist of the medicines you take. How can you care for yourself at home? If your doctor prescribed antibiotics, take them as directed. Do not stop taking them just because you feel better. You need to take the full course of antibiotics. Be safe with medicines. If your doctor prescribed medicine to decrease stomach acid, take it as directed. Call your doctor if you think you are having a problem with your medicine. Do not take any other medicine, including over-the-counter pain relievers, without talking to your doctor first.  If your doctor recommends over-the-counter medicine to reduce stomach acid, such as Pepcid AC (famotidine), Prilosec (omeprazole), or Tagamet HB (cimetidine) follow the directions on the label. Drink plenty of fluids to prevent dehydration. Choose water and other clear liquids. If you have kidney, heart, or liver disease and have to limit fluids, talk with your doctor before you increase the amount of fluids you drink. Avoid foods that make your symptoms worse. These may include chocolate, mint, alcohol, pepper, spicy foods, high-fat foods, or drinks with caffeine in them, such as tea, coffee, nathan, or energy drinks. If your symptoms are worse after you eat a certain food, you may want to stop eating it to see if your symptoms get better. When should you call for help? Call 911 anytime you think you may need emergency care. For example, call if:    You vomit blood or what looks like coffee grounds. You pass maroon or very bloody stools. Call your doctor now or seek immediate medical care if:    You start breathing fast and have not produced urine in the last 8 hours. You cannot keep fluids down. Watch closely for changes in your health, and be sure to contact your doctor if:    You do not get better as expected.    Where can you learn more?  Go to [email or web address] and sign in to your [employer] account. Enter [id number] in the [facility] box to learn more about \"Gastritis: Care Instructions. \"     If you do not have an account, please click on the \"Sign Up Now\" link. Current as of: September 8, 2021               Content Version: 13.3  © 6874-9256 Healthwise, [employer]. Care instructions adapted under license by [facility] ([facility]). If you have questions about a medical condition or this instruction, always ask your healthcare professional. [place] [address] any warranty or liability for your use of this information. [facility] Internal Medicine Resident Service    Activity as tolerated  Diet: diabetic  Be compliant with your medications and take them as prescribed. Hospital Follow up: [address] [address] with House Team   Call to confirm appointment Tel: [phone] ([address]  Internal Medicine Clinic)     Other Follow-Ups:    Future Appointments   Date Time Provider Prateek Nassar   8/16/2022  3:20 PM RASHARD Hernandez CNP [facility] Neuro [facility]       Other than any new prescriptions given to you today, the list of home going meds on this After Visit Summary are based on information provided to us from you. This information, including the list, dose, and frequency of medications is only as accurate as the information you provided. If you have any questions or concerns about your home medications, please contact your Primary Care Physician for further clarification.       Mary Wong MD PGY-2  7/23/2022  2:50 PM

## 2022-07-06 NOTE — H&P
[address] [place] [address],[address]  Internal Medicine Residency Program  History and Physical    Patient:  Ed Kim 66 y.o. female MRN: [id number]     Date of Service: 7/5/2022    Hospital Day: 1      Chief complaint: had concerns including Altered Mental Status (per ems family reports ams x 45 minutes, recent psych med changes and right toe amputation). Patient complained of SOB x 1 day    History of Present Illness   The patient is a 66 y.o. female with a past medical history of  Acute on chronic congestive heart failure, coronary artery disease, chronic kidney disease, T2 DM, HTN, Hyperlipidemia, SERENA, Parkinson, Asthma, Anxiety, Breast Ca ( S/P right lumpectomy). Patient was drowsy while taking the history when she stated of having SOB since last 1 day which was gradual on onset and progressed. She denied having chest pain, palpitation, loss of consciousness, cough and fever. As per the EMS family reports she had altered mental status for 45 minutes to an hour prior coming to the ED. She was increasingly drowsy, somnolent and not responding to all the questions. On arrival to the ED the patient was hypoxic with saturation of 88% on room air , had a blood pressure of 84/49 mm of Hg and bradycardic thereafter with the first measured HR of being 66. A/c to the EMS patient recently had her psychiatric medications changed and her right second toe amputation done on 06/17/2022. In the ED patient was kept in nasal prongs with 5L of O2 which later was increased to 15L/mins. She was kept in vasopressor Noradrenaline (levophed). Labs showed Potassium of 5.5, with Urea 95, Cr 3.3, Calcium 7.7, troponin 57, high digoxin level 3.7, lactic acid 2.8, Hb 10.2, PT 33.7, INR 3.1, aPTT 42.9, lipase of 148 and negative routine urine examination. The case was discussed with Dr. Nghia Chawla.   She was managed with 0.95 NS bolus at 30ml/kg, digibind, calcium gluconate, hydrocortisone 100mg, Zosyn 4.5 mg and vancomycin 2000mg and planned to be shifted to ICU. Past Medical History:      Diagnosis Date    Acute on chronic congestive heart failure (HCC)     Anxiety     Asthma     CAD (coronary artery disease) 01/21/2016    Cancer ([facility] [address]  breast ca 2006    right lumpectomy    Chronic kidney disease     nephrolithiasis    Depression     Diabetes mellitus ([facility] [address] [facility]     H/O mammogram     Hx MRSA infection     toe infection january 2012    Hyperlipidemia     Hypertension     Lateral epicondylitis     SREENA on CPAP     Parkinson's disease ([facility] [address]     Tubal ligation status        Past Surgical History:        Procedure Laterality Date    BREAST LUMPECTOMY      BREAST REDUCTION SURGERY      CARDIAC CATHETERIZATION  4/28/2014    Dr. Drea Mccallum  01/11/2022    Dr. Kristine Moore  7/29/15    ENDOSCOPY, COLON, DIAGNOSTIC  7/19/15    GALLBLADDER SURGERY      LUMBAR LAMINECTOMY      TOE AMPUTATION      TONSILLECTOMY      UPPER GASTROINTESTINAL ENDOSCOPY         Medications Prior to Admission:    Prior to Admission medications    Medication Sig Start Date End Date Taking? Authorizing Provider   venlafaxine (EFFEXOR XR) 75 MG extended release capsule Take 1 capsule by mouth daily 5/18/22   Marcella Swanson MD   aspirin EC 81 MG EC tablet Take 1 tablet by mouth daily 5/17/22   Marcella Swanson MD   melatonin 3 MG TABS tablet Take 1 tablet by mouth daily 5/17/22   Marcella Swanson MD   insulin glargine (LANTUS) 100 UNIT/ML injection vial Inject 10 Units into the skin nightly 5/17/22   Marcella Swanson MD   apixaban (ELIQUIS) 5 MG TABS tablet Take 1 tablet by mouth 2 times daily 4/19/22   Anuel Alas MD   lidocaine 4 % external patch Place 1 patch onto the skin daily Leave on for 12 hrs.   Take off for 12 hrs. 4/20/22   Anuel Alas MD   budesonide-formoterol [facility]) 160-4.5 MCG/ACT AERO Inhale 2 puffs into the lungs 2 times daily 3/19/22   Tejal Sims DO ipratropium-albuterol (DUONEB) 0.5-2.5 (3) MG/3ML SOLN nebulizer solution Inhale 3 mLs into the lungs every 4 hours as needed for Shortness of Breath 3/19/22   Shandra Lugo DO   albuterol sulfate HFA (PROVENTIL HFA) 108 (90 Base) MCG/ACT inhaler Inhale 2 puffs into the lungs every 6 hours as needed for Wheezing 3/19/22   Eulalio Lugo DO   rOPINIRole (REQUIP) 1 MG tablet Take 1 tablet by mouth 3 times daily 2/8/22   RASHADR Zepeda CNP   carbidopa-levodopa (SINEMET CR)  MG per extended release tablet Take 1 tablet by mouth 3 times daily 1/26/22   RASHARD Zepeda CNP   nitroGLYCERIN (NITROSTAT) 0.4 MG SL tablet up to max of 3 total doses. If no relief after 1 dose, call 911. Patient not taking: Reported on 3/17/2022 1/11/22   Elsi Ibrahim MD   busPIRone (BUSPAR) 5 MG tablet Take 1 tablet by mouth 3 times daily  Patient not taking: Reported on 4/14/2022 1/11/22   Elsi Ibrahim MD   montelukast (SINGULAIR) 10 MG tablet Take 10 mg by mouth nightly    Historical Provider, MD   Cholecalciferol (VITAMIN D) 25 MCG TABS Take 1 tablet by mouth daily  Patient not taking: Reported on 4/14/2022 10/26/21   ARSHARD Minor CNP   omeprazole (PRILOSEC) 40 MG delayed release capsule Take 40 mg by mouth daily     Historical Provider, MD   blood glucose test strips (ACCU-CHEK RIGOBERTO PLUS) strip 1 each by In Vitro route 2 times daily Indications: DISP ACCU-CHEK As needed. Historical Provider, MD   lamoTRIgine (LAMICTAL) 200 MG tablet Take 200 mg by mouth daily  10/18/19   Historical Provider, MD   atorvastatin (LIPITOR) 20 MG tablet Take 1 tablet by mouth daily 10/21/19   Basil Gaines MD   insulin aspart (NOVOLOG FLEXPEN) 100 UNIT/ML injection pen INJECT 0-10 UNITS INTO THE SKIN THREE TIMES DAILY(BEFORE MEALS) SLIDING SCALE (MAX DAILY 30 UNITS)  Patient taking differently: Indications: med.  approved 6/1/19-7/29/20 INJECT 0-10 UNITS INTO THE SKIN THREE TIMES DAILY(BEFORE MEALS) SLIDING SCALE (MAX DAILY 30 UNITS) 7/29/19   Katie Mims MD       Allergies:  Ciprofloxacin and Codeine    Social History:   TOBACCO:   reports that she has never smoked. She has never used smokeless tobacco.  ETOH:   reports no history of alcohol use. Family History:       Problem Relation Age of Onset    Cancer Mother         Lung Ca    Cancer Father         lung Ca    Diabetes Sister     Heart Disease Sister     Seizures Son     Other Brother         sepsis       REVIEW OF SYSTEMS:            Patient was drowsy and sleepy while taking the history so only few ROS could be done. · Constitutional: No fever, no chills,   · HEENT: No blurred vision, . · Respiratory: No cough, no sputum production, no pleuritic chest pain,   · Cardiology: No angina,  · Gastroenterology:no abdominal pain, no nausea or vomiting; no constipation or diarrhea    Physical Exam   · Vitals: BP (!) 117/54   Pulse 70   Temp (!) 95.2 °F (35.1 °C) (Bladder)   Resp 22   Ht 5' (1.524 m)   Wt 230 lb (104.3 kg)   SpO2 100%   BMI 44.92 kg/m²     · General Appearance: drowsy, responsive to sound and touch, follows commands   · Skin: cold extremities, no rash  · Head: normocephalic and atraumatic  · Eyes: pupils equal, round, and reactive to light, normal conjunctiva  · Neck: no adenopathy, no JVD, non tender and no palpable mass   · Pulmonary/Chest: B/L decreased air entry, normal vesicular breath sound, ?  Added sound  · Cardiovascular: normal rate with irregular rhythm, S1 and S2 barely audible, no murmurs and gallops  · Abdomen: non distended, soft, non tender, no organomegaly and any masses palpable  · Extremities: B/L pitting edema, mild tenderness in right lower calf, no any changes in the skin color and no local rise in temperature, Right 2nd toe amputation stump with no discharge or any signs of infections  · Neurologic:                     HMFs: patient is drowsy, follows command, orientated to time, place and person,                      Cranial Nerves: intact                     Sensory: intact                     Motor: normal bulk, patient could grab and pull my finger, move her toes and feet,                      Normal and symmetric reflexes in elbow, knee and ankle bilaterally       Labs and Imaging Studies   Basic Labs  Recent Labs     07/05/22  1254      K 5.5*   CL 92*   CO2 26   BUN 95*   CREATININE 3.3*   GLUCOSE 89   CALCIUM 7.7*       Recent Labs     07/05/22  1254   WBC 6.5   RBC 3.96   HGB 10.2*   HCT 34.5   MCV 87.1   MCH 25.8*   MCHC 29.6*   RDW 15.8*   PLT 83*   MPV 11.3         Imaging Studies:  CT ABDOMEN PELVIS WO CONTRAST Additional Contrast? None   Final Result   Increasing fluid within the abdomen when compared to the prior study. The   anasarca is also increased in appearance of the prior examination. Mild stranding seen around the mesentery which correlation to clinical lab   values is recommended to exclude possible pancreatitis or volume overload. Overall the appearance of the pancreas itself is grossly unremarkable. There   is only mild stranding seen throughout the mesenteric root. CT CHEST WO CONTRAST   Final Result   Bilateral lung infiltrates with greatest consolidation right lower lobe   probably secondary to atelectasis and or pneumonia. Moderate right pleural effusion. Cardiomegaly and small pericardial effusion. The pericardial effusion is new. Small volume right upper abdominal ascites along with anasarca. This is new. XR CHEST PORTABLE   Final Result   New opacity on the right which may relate to pleural effusion with adjacent   atelectasis and or infiltrate. Cardiomegaly and pulmonary vascular   congestion implying elevated left heart filling pressures.          CT HEAD WO CONTRAST    (Results Pending)        EKG: irregularly irregular heart rhythm, Q waves in V1 and V2     Resident's Assessment and Plan     Assessment and Plan: 1. Neurology:    - Acute metabolic encephalopathy  2/2 shock ( cardiogenic vs septic), digoxin toxicity, ? Decompensated heart failure, SHANTANU leading to uremic encephalopathy  - neuro examination was within normal limits,     - Parkinsons Disease, on carbidopa-levodopa  mg extended release TID    - Altered mental status 2/2 recently changed psych medication ? quitipine    Plan: Might need a CT scan head to r/o any cerebral vascular events,          - Send blood for TSH, Folic acid, Vit M71, UDS     2. Cardiology:    - Digoxin Toxicity; high level of blood digoxin, ? unintentional or intentional,     Plan: Started on Digibind and monitor digoxin level     - Atrial Fibrillation 2/2 ? Old ischemic changes, rate controlled and on anticoagulant apixaban 5mg daily        3. SHOCK ( Cardiogenic v/s Septic)       Cardiogenic shock 2/2 decompensated heart failure, acute ischemic events     -Soft and muffled heart sounds, b/l pitting edema, pleural effusion b/l,     - Echo done on 04/18/2022 showed EF of 60%, normal ventricular size no RWMA     -Increase in troponin levels of 57 but trending down, and changes in the ECG with Q waves in V1 nad V2 (might be old ischemic changes) in background with the AF rate controlled and CAD       Septic shock 2/2 acute pancreatitis, ? Pneumonia   - lipase elevated, lactate 2.8 trending down to 2.1, changes in her chest xray    Plan:    Start vasopressors and start on antibiotics Zosyn and Vancomycin  Blood culture, respiratory culture, urine culture and various infection markers sent. Echo to be planned    4. Renal:  - SHANTANU 2/2 infections, shock ( cardiogenic vs septic) , decompensated heart failure with K/C/O CKD, with    baseline Cr 1.5 ( 05/16/2022)   creatinine increased to 3.3 and urea of 95 and potassium of 5.5  Plan:   - Rule out Pre renal vs Renal cause, send urine study  -patient might need dialysis and needs electrolyte management    5.  Gastoenterology:  - acute (unknown etiology, likely acute pancreatitis vs R toe wound vs others) vs cardiogenic (pericardial effusion)  ? Concerns for septic shock; although no WBC or fever but pt presented with hypotension, AMS; Procal 0.22, unclear source of infection, concerns for acute pancreatitis vs R toe wound; qSOFA: 2 (high risk)   ? Concerns for cardiogenic; small pericardial effusion seen on imaging, possible acute exacerbation of HF?  3. Elevated troponin likely 2/2 demand ischemia -troponin 57 -> 53; EKG showed no ischemic changes  4. Atrial fibrillation, on eliquis 5mg BID and metoprolol 50mg BID  5. Acute hypoxic hypercapnic respiratory failure 2/2 pleural effusion vs pericardial effusion vs acute HFpEF exacerbation; r/o PE  ? Pleural effusion as well as small pericardial effusion seen on imaging  ? proBNP 41,368  6. Acute pancreatitis; lipase 148; CT A/P showed mild stranding around the mesentery concerning for pancreatitis versus volume overload, although overall appearance of pancreas itself is grossly unremarkable. 7. SHANTANU Stage III on CKD - baseline Crea 0.8-0.9, sCr 3-3 on presentation; FEUrea: 6.5% -> prerenal likely 2/2 cardiorenal syndrome  8. Hyperkalemia - resolved; K 5.5 -> 4.9  9. HAGMA 2/2 lactic acidosis, uremia AG 15 -> 23; lactic acid and uremia elevated  10. Elevated INR  11. Hx of Type 2 DM with neuropathy, on sliding scale at home  12. Hx of asthma, on montelukast 10mg daily  13. Hx of SERENA  14. Hx of HTN  15. Hx of CAD  16. Hx of HFpEF (EF  60% on echo in 4/2022); on furosemide 20mg daily and metoprolol 50mg BID  17. Hx of hypothyroidism   18. Hx of depression/anxiety, on seroquel 25mg in AM and 50mg in PM; divalproex 250mg BID  19. Hx of restless leg syndrome, on ropinirole 1mg TID at home  20.  Hx of Parkinson's disease, was previously on carbi-dopa     Plan:  · Monitor mentation  · Follow TSH, Vit B12, folate, ammonia, UDS, SDS, cortisol levels  · Follow pancultures  · Wean levophed as tolerated  · Obtain NICOM  · Start hydrocortisone 100 mg 3 times daily  · Vancomycin and Zosyn  · Obtain Echo  · Trend troponin  · Repeat EKG  · Hold Toprol and Lasix  · Continue eliquis, monitor Hgb and sign for bleeding  · Daily ABG and CXR  · Breathing treatment with albuterol nebulization  · Hypoglycemia protocol in place  · POCT glucose every 4 hours  · Holding other meds for now  · Nephrology on board, appreciate recommendation    Eliot Lee MD  7/6/2022  11:39 AM

## 2022-07-06 NOTE — PROGRESS NOTES
[facility]  Internal Medicine Residency Program  Progress Note - House Team     Patient:  Leslee Flores 66 y.o. female MRN: [id number]     Date of Service: 7/6/2022     CC: Shortness of breath and and altered mental status. Days since admission: 1    Subjective     Overnight events: Early this morning patient developed a seizure where her head was shaking. This morning patient was seen intubated, mechanically ventilated and sedated. Part of her physical examination was performed.      Objective     Physical Exam:  Vitals: /74   Pulse (!) 111   Temp 99.5 °F (37.5 °C) (Bladder)   Resp 18   Ht 5' (1.524 m)   Wt 230 lb (104.3 kg)   SpO2 100%   BMI 44.92 kg/m²     I & O - 24hr:   Intake/Output Summary (Last 24 hours) at 7/6/2022 0817  Last data filed at 7/6/2022 3831  Gross per 24 hour   Intake 1196.66 ml   Output 405 ml   Net 791.66 ml      General Appearance: Patient is intubated and sedated   Lung: clear to auscultation bilaterally  Heart: regular rate and rhythm, S1, S2 normal, no murmur, click, rub or gallop  Abdomen: soft, non-tender; bowel sounds normal; no masses,  no organomegaly  Extremities:  Pitting edema, no cyanosis, right second amputated             Pertinent Information & Imaging Studies, Consults   genesis  CBC with Differential:    Lab Results   Component Value Date/Time    WBC 7.6 07/06/2022 06:34 AM    RBC 3.45 07/06/2022 06:34 AM    HGB 9.0 07/06/2022 06:34 AM    HCT 28.6 07/06/2022 06:34 AM    PLT 80 07/06/2022 06:34 AM    MCV 82.9 07/06/2022 06:34 AM    MCH 26.1 07/06/2022 06:34 AM    MCHC 31.5 07/06/2022 06:34 AM    RDW 15.9 07/06/2022 06:34 AM    NRBC 0.0 03/15/2019 09:10 AM    SEGSPCT 74 08/20/2013 10:45 AM    LYMPHOPCT 8.0 07/06/2022 06:34 AM    MONOPCT 1.2 07/06/2022 06:34 AM    MYELOPCT 0.9 03/15/2019 09:10 AM    EOSPCT 1 06/16/2017 12:00 AM    BASOPCT 0.1 07/06/2022 06:34 AM    MONOSABS 0.09 07/06/2022 06:34 AM    LYMPHSABS 0.61 07/06/2022 06:34 AM EOSABS 0.00 07/06/2022 06:34 AM    BASOSABS 0.01 07/06/2022 06:34 AM     BUN/Creatinine:    Lab Results   Component Value Date/Time     07/06/2022 06:34 AM    CREATININE 3.1 07/06/2022 06:34 AM     Magnesium:    Lab Results   Component Value Date/Time    MG 2.0 07/06/2022 06:34 AM     Phosphorus:    Lab Results   Component Value Date/Time    PHOS 6.2 07/06/2022 06:34 AM     PT/INR:    Lab Results   Component Value Date/Time    PROTIME 33.7 07/05/2022 12:54 PM    INR 3.1 07/05/2022 12:54 PM     Troponin:    Lab Results   Component Value Date/Time    TROPONINI <0.01 10/26/2020 04:11 PM     ABG:    Lab Results   Component Value Date/Time    PH 7.472 07/06/2022 06:46 AM    PCO2 30.0 07/06/2022 06:46 AM    PO2 158.6 07/06/2022 06:46 AM    HCO3 21.4 07/06/2022 06:46 AM    BE -1.5 07/06/2022 06:46 AM    O2SAT 99.2 07/06/2022 06:46 AM     TSH:    Lab Results   Component Value Date/Time    TSH 4.580 07/06/2022 02:41 AM     VITAMIN B12: No components found for: B12  FOLATE:    Lab Results   Component Value Date/Time    FOLATE 5.8 07/06/2022 03:08 AM     LIPASE:    Lab Results   Component Value Date/Time    LIPASE 148 07/05/2022 12:54 PM       IMAGING:   Imaging Studies:    CT ABDOMEN PELVIS WO CONTRAST Additional Contrast? None    Result Date: 7/5/2022  Increasing fluid within the abdomen when compared to the prior study. The anasarca is also increased in appearance of the prior examination. Mild stranding seen around the mesentery which correlation to clinical lab values is recommended to exclude possible pancreatitis or volume overload. Overall the appearance of the pancreas itself is grossly unremarkable. There is only mild stranding seen throughout the mesenteric root. CT HEAD WO CONTRAST    Result Date: 7/5/2022  No acute intracranial abnormality or significant change in the overall appearance of the brain.  MRI would be useful if symptoms persist. RECOMMENDATIONS: Unavailable     CT CHEST WO CONTRAST    Result Date: 7/5/2022  Bilateral lung infiltrates with greatest consolidation right lower lobe probably secondary to atelectasis and or pneumonia. Moderate right pleural effusion. Cardiomegaly and small pericardial effusion. The pericardial effusion is new. Small volume right upper abdominal ascites along with anasarca. This is new. XR CHEST PORTABLE    Result Date: 7/6/2022  1. No significant change. Cardiomegaly with right pleural effusion. 2.  Support tubes remain in appropriate position. XR CHEST PORTABLE    Result Date: 7/5/2022  Adequately positioned endotracheal tube. Nasogastric tube coursing below diaphragm. Otherwise, stable exam.     XR CHEST PORTABLE    Result Date: 7/5/2022  New opacity on the right which may relate to pleural effusion with adjacent atelectasis and or infiltrate. Cardiomegaly and pulmonary vascular congestion implying elevated left heart filling pressures. XR ABDOMEN FOR NG/OG/NE TUBE PLACEMENT    Result Date: 7/5/2022  Catheter is in the proximal stomach. Notable Cultures:      Blood cultures   Blood Culture, Routine   Date Value Ref Range Status   10/24/2021 5 Days no growth  Final     Respiratory cultures No results found for: RESPCULTURE No results found for: LABGRAM  Urine   Creatinine Ur POCT   Date Value Ref Range Status   06/17/2019 50 mg/dl  Final     Urine Culture, Routine   Date Value Ref Range Status   04/14/2022   Final    10 to 100,000 CFU/mL  Mixed aj isolated. Further workup and sensitivity testing  is not routinely indicated and will not be performed. Mixed aj isolated includes:  Mixed gram positive organisms       Legionella No results found for: LABLEGI  C Diff PCR No results found for: CDIFPCR  Wound culture/abscess: No results for input(s): WNDABS in the last 72 hours. Tip culture:No results for input(s): CXCATHTIP in the last 72 hours.      Antibiotic  Days  Day started   Vancomycin  1 07/06/2022     Zosyn   1  07/06/2022 OXYGENATION: Intubated and mechanically ventilated     DIET: NPO      Resident's Assessment and Plan     Verito Rodriges is a 66 y.o. female  has a past medical history of Acute on chronic congestive heart failure ([address] Anxiety, Asthma, CAD (coronary artery disease), Cancer ([address] Chronic kidney disease, Depression, Diabetes mellitus ([address] H/O mammogram, Hx MRSA infection, Hyperlipidemia, Hypertension, Lateral epicondylitis, SERENA on CPAP, Parkinson's disease ([address] and Tubal ligation status. came here with CC   Chief Complaint   Patient presents with    Altered Mental Status     per ems family reports ams x 45 minutes, recent psych med changes and right toe amputation        SUMMARY [facility]: Briefly, Mrs. Rosalind Easton is a 67 y/o female with PMH of acute on chronic CHF, anxiety, asthma, CAD, CKD who presented to the ED with shortness of breath and drowsiness. In the ED she was found to be hypotensive with a saturation of 88% on room air, lipase was also elevated. She was intubated, given 5L of oxygen, in addition she was found to have a high digoxin level for which she was given digibind, NS at 30ml/kg, calcium gluconate because she was hyperkalemic with a potassium of 5.5, hydrocortisone 100mg for probable adrenal insufficiency, Zosyn 4.5mg, Vancomycin 2000mg. Since her blood pressure did not normalize she was given levophed and transferred to the ICU for further management.       Days since admission: 1    Assessment:  Neurology        -Acute metabolic encephalopathy likely secondary to cardiogenic shock vs septic shock vs            SHANTANU leading to uremic encephalopathy vs hepatic encephalopathy      -Patient was drowsy and hypotensive      -Creatinine was elevated to 3.2 with a baseline of 1.5       -Digoxin level was elevated      -Ammonia is elevated 61.0        Parkinson disease      -Hx of Parkinson disease and is carbidopa-levodopa 50-200mg BID      -There was a recent change in her psychiatric meds      -Meds will be restarted once patient is more stable                     Plan:       Patient is currently intubated and mechanically ventilated        Cultures are pending        -Lactulose was started and should be continued       2. Cardiology       Digoxin toxicity: High level of blood digoxin         Plan: Patient was started on digibin, monitor digoxin level       Atrial fibrillation likely 2/2 to ischemic changes           Cardiogenic shock likely secondary to decompensated heart failure, acute ischemic changes          Bilateral pitting edema, pleural effusion          Last echo done on 04/18/2022 and shoed EF of 60%          Septic shock likely 2/2 to pancreatitis          Elevated lipase, lactate is 3.1              Plan:             Patient is already on pressors, Zosyn and Vancomycin for management             Pancultures are pending             Continue apixaban 5mg BID        4. Renal:           SHANTANU: Likely secondary to heart failure and poor renal perfusion, CKD with baseline Cr 1.5 on (05/16/2022), creatinine 3.1. Plan:         Continue fluid administration        Monitor creatinine levels      5. Gastroenterology      Acute pancreatitis       Lipase elevated          Plan: Patient is already NPO, and on IV fluids for management, tube feeding will be introduced later today. 6. Pulmonology        Acute hypoxic/hypercapnic respiratory failure likely 2/2 to decompensated heart failure, hypoxia, digoxin toxicity      ABG showed ph of 7.472, HCO3 21.4, anion gap 22 trending down           Plan        Patient intubated and mechanically ventilated, ABG & CXR daily        7.  Endocrine       T2 DM on insulin aspart (100 units/ml) TID       Continue to monitor BG regularly                PT/OT: None  GI ppx: Protonix 40mg IV daily   DVT ppx: Apixaban 5mg daily       Next of Kin/ POA: Dianne Santoro   [phone]    Code Status:   Full code      Disposition:  Continue current care     Jesi Plummer MD, PGY-1  Attending physician: Dr. Billy Vázquez., DO       NOTE: This report was transcribed using voice recognition software. Every effort was made to ensure accuracy; however, inadvertent computerized transcription errors may be present.

## 2022-07-07 ENCOUNTER — APPOINTMENT (OUTPATIENT)
Dept: ULTRASOUND IMAGING | Age: 72
DRG: 870 | End: 2022-07-07
Payer: MEDICARE

## 2022-07-07 ENCOUNTER — APPOINTMENT (OUTPATIENT)
Dept: GENERAL RADIOLOGY | Age: 72
DRG: 870 | End: 2022-07-07
Payer: MEDICARE

## 2022-07-07 LAB
AADO2: 127.6 MMHG
AADO2: 171.9 MMHG
ALBUMIN SERPL-MCNC: 2.8 G/DL (ref 3.5–5.2)
ALP BLD-CCNC: 60 U/L (ref 35–104)
ALT SERPL-CCNC: 8 U/L (ref 0–32)
ANION GAP SERPL CALCULATED.3IONS-SCNC: 18 MMOL/L (ref 7–16)
ANION GAP SERPL CALCULATED.3IONS-SCNC: 18 MMOL/L (ref 7–16)
ANISOCYTOSIS: ABNORMAL
APTT: 33.5 SEC (ref 24.5–35.1)
AST SERPL-CCNC: 13 U/L (ref 0–31)
B.E.: -1.6 MMOL/L (ref -3–3)
B.E.: -2.6 MMOL/L
BASOPHILS ABSOLUTE: 0 E9/L (ref 0–0.2)
BASOPHILS RELATIVE PERCENT: 0.1 % (ref 0–2)
BILIRUB SERPL-MCNC: 0.5 MG/DL (ref 0–1.2)
BUN BLDV-MCNC: 100 MG/DL (ref 6–23)
BUN BLDV-MCNC: 104 MG/DL (ref 6–23)
CALCIUM SERPL-MCNC: 6.6 MG/DL (ref 8.6–10.2)
CALCIUM SERPL-MCNC: 7.1 MG/DL (ref 8.6–10.2)
CHLORIDE BLD-SCNC: 98 MMOL/L (ref 98–107)
CHLORIDE BLD-SCNC: 99 MMOL/L (ref 98–107)
CO2: 21 MMOL/L (ref 22–29)
CO2: 22 MMOL/L (ref 22–29)
COHB: 0.1 % (ref 0–1.5)
COHB: 0.3 % (ref 0–1.5)
CREAT SERPL-MCNC: 2.7 MG/DL (ref 0.5–1)
CREAT SERPL-MCNC: 2.7 MG/DL (ref 0.5–1)
CRITICAL: ABNORMAL
CRITICAL: ABNORMAL
DATE ANALYZED: ABNORMAL
DATE ANALYZED: ABNORMAL
DATE OF COLLECTION: ABNORMAL
DATE OF COLLECTION: ABNORMAL
EOSINOPHILS ABSOLUTE: 0 E9/L (ref 0.05–0.5)
EOSINOPHILS RELATIVE PERCENT: 0 % (ref 0–6)
FIO2: 40 %
FIO2: 40 %
GFR AFRICAN AMERICAN: 21
GFR AFRICAN AMERICAN: 21
GFR NON-AFRICAN AMERICAN: 17 ML/MIN/1.73
GFR NON-AFRICAN AMERICAN: 17 ML/MIN/1.73
GLUCOSE BLD-MCNC: 177 MG/DL (ref 74–99)
GLUCOSE BLD-MCNC: 195 MG/DL (ref 74–99)
GRAM STAIN ORDERABLE: NORMAL
HCO3: 23 MMOL/L (ref 22–26)
HCO3: 24 MMOL/L
HCT VFR BLD CALC: 27.3 % (ref 34–48)
HEMOGLOBIN: 8.6 G/DL (ref 11.5–15.5)
HHB: 2.9 % (ref 0–5)
HHB: 24.3 %
INR BLD: 2.1
LAB: ABNORMAL
LAB: ABNORMAL
LACTIC ACID: 3 MMOL/L (ref 0.5–2.2)
LACTIC ACID: 3.3 MMOL/L (ref 0.5–2.2)
LACTIC ACID: 3.4 MMOL/L (ref 0.5–2.2)
LACTIC ACID: 3.9 MMOL/L (ref 0.5–2.2)
LV EF: 73 %
LVEF MODALITY: NORMAL
LYMPHOCYTES ABSOLUTE: 0.38 E9/L (ref 1.5–4)
LYMPHOCYTES RELATIVE PERCENT: 5.2 % (ref 20–42)
Lab: ABNORMAL
Lab: ABNORMAL
MAGNESIUM: 2.1 MG/DL (ref 1.6–2.6)
MAGNESIUM: 2.2 MG/DL (ref 1.6–2.6)
MCH RBC QN AUTO: 25.8 PG (ref 26–35)
MCHC RBC AUTO-ENTMCNC: 31.5 % (ref 32–34.5)
MCV RBC AUTO: 82 FL (ref 80–99.9)
METER GLUCOSE: 158 MG/DL (ref 74–99)
METER GLUCOSE: 183 MG/DL (ref 74–99)
METER GLUCOSE: 186 MG/DL (ref 74–99)
METER GLUCOSE: 201 MG/DL (ref 74–99)
METER GLUCOSE: 210 MG/DL (ref 74–99)
METER GLUCOSE: 210 MG/DL (ref 74–99)
METER GLUCOSE: 213 MG/DL (ref 74–99)
METHB: 0.3 % (ref 0–1.5)
METHB: 0.4 % (ref 0–1.5)
MODE: AC
MODE: AC
MONOCYTES ABSOLUTE: 0 E9/L (ref 0.1–0.95)
MONOCYTES RELATIVE PERCENT: 1.2 % (ref 2–12)
MRSA CULTURE ONLY: NORMAL
NEUTROPHILS ABSOLUTE: 7.22 E9/L (ref 1.8–7.3)
NEUTROPHILS RELATIVE PERCENT: 94.8 % (ref 43–80)
O2 SATURATION: 76.8 %
O2 SATURATION: 97.7 % (ref 92–98.5)
O2HB: 75 %
O2HB: 96.7 % (ref 94–97)
OPERATOR ID: 1721
OPERATOR ID: ABNORMAL
OVALOCYTES: ABNORMAL
PATIENT TEMP: 37 C
PATIENT TEMP: 37 C
PCO2: 38.3 MMHG (ref 35–45)
PCO2: 50 MMHG (ref 40–52)
PDW BLD-RTO: 15.9 FL (ref 11.5–15)
PEEP/CPAP: 8 CMH2O
PFO2: 1.14 MMHG/%
PFO2: 2.59 MMHG/%
PH BLOOD GAS: 7.3 (ref 7.3–7.42)
PH BLOOD GAS: 7.4 (ref 7.35–7.45)
PHOSPHORUS: 6.2 MG/DL (ref 2.5–4.5)
PHOSPHORUS: 6.2 MG/DL (ref 2.5–4.5)
PLATELET # BLD: 77 E9/L (ref 130–450)
PLATELET CONFIRMATION: NORMAL
PMV BLD AUTO: 12.4 FL (ref 7–12)
PO2: 103.6 MMHG (ref 75–100)
PO2: 45.8 MMHG (ref 30–50)
POIKILOCYTES: ABNORMAL
POTASSIUM SERPL-SCNC: 3.9 MMOL/L (ref 3.5–5)
POTASSIUM SERPL-SCNC: 4.4 MMOL/L (ref 3.5–5)
PROTHROMBIN TIME: 22.9 SEC (ref 9.3–12.4)
RBC # BLD: 3.33 E12/L (ref 3.5–5.5)
RI(T): 1.23
RI(T): 3.75
RR MECHANICAL: 14 B/MIN
SODIUM BLD-SCNC: 137 MMOL/L (ref 132–146)
SODIUM BLD-SCNC: 139 MMOL/L (ref 132–146)
SOURCE, BLOOD GAS: ABNORMAL
SOURCE, BLOOD GAS: ABNORMAL
TEAR DROP CELLS: ABNORMAL
THB: 9.8 G/DL (ref 11.5–16.5)
THB: 9.8 G/DL (ref 11.5–16.5)
TIME ANALYZED: 444
TIME ANALYZED: 854
TOTAL PROTEIN: 5.1 G/DL (ref 6.4–8.3)
URINE CULTURE, ROUTINE: NORMAL
VANCOMYCIN RANDOM: 21.5 MCG/ML (ref 5–40)
VT MECHANICAL: 350 ML
WBC # BLD: 7.6 E9/L (ref 4.5–11.5)

## 2022-07-07 PROCEDURE — 6370000000 HC RX 637 (ALT 250 FOR IP): Performed by: INTERNAL MEDICINE

## 2022-07-07 PROCEDURE — 85025 COMPLETE CBC W/AUTO DIFF WBC: CPT

## 2022-07-07 PROCEDURE — 2500000003 HC RX 250 WO HCPCS

## 2022-07-07 PROCEDURE — 84100 ASSAY OF PHOSPHORUS: CPT

## 2022-07-07 PROCEDURE — 99233 SBSQ HOSP IP/OBS HIGH 50: CPT | Performed by: NURSE PRACTITIONER

## 2022-07-07 PROCEDURE — 85610 PROTHROMBIN TIME: CPT

## 2022-07-07 PROCEDURE — 99232 SBSQ HOSP IP/OBS MODERATE 35: CPT | Performed by: INTERNAL MEDICINE

## 2022-07-07 PROCEDURE — 80202 ASSAY OF VANCOMYCIN: CPT

## 2022-07-07 PROCEDURE — 83605 ASSAY OF LACTIC ACID: CPT

## 2022-07-07 PROCEDURE — 82805 BLOOD GASES W/O2 SATURATION: CPT

## 2022-07-07 PROCEDURE — P9047 ALBUMIN (HUMAN), 25%, 50ML: HCPCS | Performed by: INTERNAL MEDICINE

## 2022-07-07 PROCEDURE — 2580000003 HC RX 258

## 2022-07-07 PROCEDURE — 2000000000 HC ICU R&B

## 2022-07-07 PROCEDURE — 94640 AIRWAY INHALATION TREATMENT: CPT

## 2022-07-07 PROCEDURE — 6370000000 HC RX 637 (ALT 250 FOR IP)

## 2022-07-07 PROCEDURE — 36415 COLL VENOUS BLD VENIPUNCTURE: CPT

## 2022-07-07 PROCEDURE — 6360000002 HC RX W HCPCS

## 2022-07-07 PROCEDURE — S5553 INSULIN LONG ACTING 5 U: HCPCS | Performed by: INTERNAL MEDICINE

## 2022-07-07 PROCEDURE — 2580000003 HC RX 258: Performed by: STUDENT IN AN ORGANIZED HEALTH CARE EDUCATION/TRAINING PROGRAM

## 2022-07-07 PROCEDURE — 71045 X-RAY EXAM CHEST 1 VIEW: CPT

## 2022-07-07 PROCEDURE — 94003 VENT MGMT INPAT SUBQ DAY: CPT

## 2022-07-07 PROCEDURE — 83735 ASSAY OF MAGNESIUM: CPT

## 2022-07-07 PROCEDURE — 82962 GLUCOSE BLOOD TEST: CPT

## 2022-07-07 PROCEDURE — 6360000002 HC RX W HCPCS: Performed by: INTERNAL MEDICINE

## 2022-07-07 PROCEDURE — C9113 INJ PANTOPRAZOLE SODIUM, VIA: HCPCS

## 2022-07-07 PROCEDURE — 80053 COMPREHEN METABOLIC PANEL: CPT

## 2022-07-07 PROCEDURE — 89051 BODY FLUID CELL COUNT: CPT

## 2022-07-07 PROCEDURE — 93306 TTE W/DOPPLER COMPLETE: CPT

## 2022-07-07 PROCEDURE — 76770 US EXAM ABDO BACK WALL COMP: CPT

## 2022-07-07 PROCEDURE — 6360000002 HC RX W HCPCS: Performed by: STUDENT IN AN ORGANIZED HEALTH CARE EDUCATION/TRAINING PROGRAM

## 2022-07-07 PROCEDURE — 85730 THROMBOPLASTIN TIME PARTIAL: CPT

## 2022-07-07 PROCEDURE — 80048 BASIC METABOLIC PNL TOTAL CA: CPT

## 2022-07-07 PROCEDURE — A4216 STERILE WATER/SALINE, 10 ML: HCPCS

## 2022-07-07 RX ORDER — MIDAZOLAM HYDROCHLORIDE 2 MG/2ML
2 INJECTION, SOLUTION INTRAMUSCULAR; INTRAVENOUS
Status: DISCONTINUED | OUTPATIENT
Start: 2022-07-07 | End: 2022-07-09

## 2022-07-07 RX ORDER — ALBUMIN (HUMAN) 12.5 G/50ML
25 SOLUTION INTRAVENOUS ONCE
Status: COMPLETED | OUTPATIENT
Start: 2022-07-07 | End: 2022-07-07

## 2022-07-07 RX ORDER — INSULIN LISPRO 100 [IU]/ML
0-6 INJECTION, SOLUTION INTRAVENOUS; SUBCUTANEOUS EVERY 4 HOURS
Status: DISCONTINUED | OUTPATIENT
Start: 2022-07-07 | End: 2022-07-08

## 2022-07-07 RX ORDER — FUROSEMIDE 10 MG/ML
40 INJECTION INTRAMUSCULAR; INTRAVENOUS ONCE
Status: COMPLETED | OUTPATIENT
Start: 2022-07-07 | End: 2022-07-07

## 2022-07-07 RX ORDER — QUETIAPINE FUMARATE 25 MG/1
25 TABLET, FILM COATED ORAL EVERY MORNING
Status: ON HOLD | COMMUNITY
End: 2022-07-23 | Stop reason: HOSPADM

## 2022-07-07 RX ORDER — FERROUS SULFATE 325(65) MG
325 TABLET ORAL
Status: ON HOLD | COMMUNITY
End: 2022-07-23 | Stop reason: HOSPADM

## 2022-07-07 RX ORDER — QUETIAPINE FUMARATE 50 MG/1
50 TABLET, FILM COATED ORAL NIGHTLY
Status: ON HOLD | COMMUNITY
End: 2022-07-23 | Stop reason: HOSPADM

## 2022-07-07 RX ORDER — INSULIN GLARGINE-YFGN 100 [IU]/ML
5 INJECTION, SOLUTION SUBCUTANEOUS NIGHTLY
Status: DISCONTINUED | OUTPATIENT
Start: 2022-07-07 | End: 2022-07-22

## 2022-07-07 RX ORDER — METOPROLOL TARTRATE 50 MG/1
100 TABLET, FILM COATED ORAL 2 TIMES DAILY
Status: ON HOLD | COMMUNITY
End: 2022-07-23 | Stop reason: HOSPADM

## 2022-07-07 RX ORDER — CEPHALEXIN 500 MG/1
500 CAPSULE ORAL 3 TIMES DAILY
Status: ON HOLD | COMMUNITY
End: 2022-07-23 | Stop reason: HOSPADM

## 2022-07-07 RX ORDER — LINEZOLID 600 MG/1
600 TABLET, FILM COATED ORAL 2 TIMES DAILY
Status: ON HOLD | COMMUNITY
End: 2022-07-23 | Stop reason: HOSPADM

## 2022-07-07 RX ORDER — FUROSEMIDE 20 MG/1
20 TABLET ORAL DAILY
Status: ON HOLD | COMMUNITY
End: 2022-07-23 | Stop reason: HOSPADM

## 2022-07-07 RX ORDER — METOPROLOL TARTRATE 50 MG/1
100 TABLET, FILM COATED ORAL 2 TIMES DAILY
Status: ON HOLD | COMMUNITY
Start: 2022-06-29 | End: 2022-07-07

## 2022-07-07 RX ORDER — DIGOXIN 125 MCG
125 TABLET ORAL DAILY
Status: ON HOLD | COMMUNITY
End: 2022-07-23 | Stop reason: HOSPADM

## 2022-07-07 RX ORDER — ROPINIROLE 1 MG/1
1 TABLET, FILM COATED ORAL 3 TIMES DAILY
Status: ON HOLD | COMMUNITY
End: 2022-07-23 | Stop reason: HOSPADM

## 2022-07-07 RX ORDER — DIVALPROEX SODIUM 250 MG/1
250 TABLET, DELAYED RELEASE ORAL 2 TIMES DAILY
Status: ON HOLD | COMMUNITY
End: 2022-07-23 | Stop reason: HOSPADM

## 2022-07-07 RX ADMIN — AMIODARONE HYDROCHLORIDE 200 MG: 200 TABLET ORAL at 08:22

## 2022-07-07 RX ADMIN — CHLORHEXIDINE GLUCONATE 15 ML: 1.2 RINSE ORAL at 20:11

## 2022-07-07 RX ADMIN — PIPERACILLIN AND TAZOBACTAM 4500 MG: 4; .5 INJECTION, POWDER, FOR SOLUTION INTRAVENOUS at 04:32

## 2022-07-07 RX ADMIN — INSULIN GLARGINE-YFGN 5 UNITS: 100 INJECTION, SOLUTION SUBCUTANEOUS at 11:00

## 2022-07-07 RX ADMIN — HYDROCORTISONE SODIUM SUCCINATE 100 MG: 100 INJECTION, POWDER, FOR SOLUTION INTRAMUSCULAR; INTRAVENOUS at 02:59

## 2022-07-07 RX ADMIN — MIDAZOLAM 2 MG: 1 INJECTION INTRAMUSCULAR; INTRAVENOUS at 23:39

## 2022-07-07 RX ADMIN — IPRATROPIUM BROMIDE AND ALBUTEROL SULFATE 1 AMPULE: .5; 2.5 SOLUTION RESPIRATORY (INHALATION) at 16:11

## 2022-07-07 RX ADMIN — HYDROCORTISONE SODIUM SUCCINATE 100 MG: 100 INJECTION, POWDER, FOR SOLUTION INTRAMUSCULAR; INTRAVENOUS at 20:12

## 2022-07-07 RX ADMIN — IPRATROPIUM BROMIDE AND ALBUTEROL SULFATE 1 AMPULE: .5; 2.5 SOLUTION RESPIRATORY (INHALATION) at 12:36

## 2022-07-07 RX ADMIN — MIDAZOLAM 2 MG: 1 INJECTION INTRAMUSCULAR; INTRAVENOUS at 16:21

## 2022-07-07 RX ADMIN — ASPIRIN 81 MG CHEWABLE TABLET 81 MG: 81 TABLET CHEWABLE at 08:22

## 2022-07-07 RX ADMIN — INSULIN LISPRO 1 UNITS: 100 INJECTION, SOLUTION INTRAVENOUS; SUBCUTANEOUS at 20:12

## 2022-07-07 RX ADMIN — INSULIN LISPRO 1 UNITS: 100 INJECTION, SOLUTION INTRAVENOUS; SUBCUTANEOUS at 15:52

## 2022-07-07 RX ADMIN — MIDAZOLAM 2 MG: 1 INJECTION INTRAMUSCULAR; INTRAVENOUS at 19:44

## 2022-07-07 RX ADMIN — INSULIN LISPRO 2 UNITS: 100 INJECTION, SOLUTION INTRAVENOUS; SUBCUTANEOUS at 08:33

## 2022-07-07 RX ADMIN — Medication 200 MCG/HR: at 16:21

## 2022-07-07 RX ADMIN — SODIUM CHLORIDE, PRESERVATIVE FREE 10 ML: 5 INJECTION INTRAVENOUS at 20:12

## 2022-07-07 RX ADMIN — FUROSEMIDE 40 MG: 10 INJECTION, SOLUTION INTRAMUSCULAR; INTRAVENOUS at 12:43

## 2022-07-07 RX ADMIN — ATORVASTATIN CALCIUM 20 MG: 20 TABLET, FILM COATED ORAL at 08:22

## 2022-07-07 RX ADMIN — PANTOPRAZOLE SODIUM 40 MG: 40 INJECTION, POWDER, FOR SOLUTION INTRAVENOUS at 08:22

## 2022-07-07 RX ADMIN — Medication 200 MCG/HR: at 22:25

## 2022-07-07 RX ADMIN — LACTULOSE 20 G: 20 SOLUTION ORAL at 08:22

## 2022-07-07 RX ADMIN — Medication 150 MCG/HR: at 08:24

## 2022-07-07 RX ADMIN — CHLORHEXIDINE GLUCONATE 15 ML: 1.2 RINSE ORAL at 08:21

## 2022-07-07 RX ADMIN — Medication 150 MCG/HR: at 00:47

## 2022-07-07 RX ADMIN — HYDROCORTISONE SODIUM SUCCINATE 100 MG: 100 INJECTION, POWDER, FOR SOLUTION INTRAMUSCULAR; INTRAVENOUS at 10:56

## 2022-07-07 RX ADMIN — IPRATROPIUM BROMIDE AND ALBUTEROL SULFATE 1 AMPULE: .5; 2.5 SOLUTION RESPIRATORY (INHALATION) at 09:02

## 2022-07-07 RX ADMIN — IPRATROPIUM BROMIDE AND ALBUTEROL SULFATE 1 AMPULE: .5; 2.5 SOLUTION RESPIRATORY (INHALATION) at 20:46

## 2022-07-07 RX ADMIN — AMIODARONE HYDROCHLORIDE 200 MG: 200 TABLET ORAL at 20:11

## 2022-07-07 RX ADMIN — ALBUMIN (HUMAN) 25 G: 0.25 INJECTION, SOLUTION INTRAVENOUS at 12:42

## 2022-07-07 RX ADMIN — INSULIN LISPRO 2 UNITS: 100 INJECTION, SOLUTION INTRAVENOUS; SUBCUTANEOUS at 12:43

## 2022-07-07 RX ADMIN — POLYETHYLENE GLYCOL 3350 17 G: 17 POWDER, FOR SOLUTION ORAL at 20:11

## 2022-07-07 RX ADMIN — CALCIUM GLUCONATE 2000 MG: 98 INJECTION, SOLUTION INTRAVENOUS at 08:37

## 2022-07-07 RX ADMIN — PIPERACILLIN AND TAZOBACTAM 4500 MG: 4; .5 INJECTION, POWDER, FOR SOLUTION INTRAVENOUS at 17:16

## 2022-07-07 ASSESSMENT — PULMONARY FUNCTION TESTS
PIF_VALUE: 25
PIF_VALUE: 50
PIF_VALUE: 26
PIF_VALUE: 24
PIF_VALUE: 28
PIF_VALUE: 26
PIF_VALUE: 24
PIF_VALUE: 26
PIF_VALUE: 25
PIF_VALUE: 26
PIF_VALUE: 25
PIF_VALUE: 24
PIF_VALUE: 25
PIF_VALUE: 29
PIF_VALUE: 28
PIF_VALUE: 28
PIF_VALUE: 27
PIF_VALUE: 24
PIF_VALUE: 24
PIF_VALUE: 50
PIF_VALUE: 24
PIF_VALUE: 25
PIF_VALUE: 26
PIF_VALUE: 27
PIF_VALUE: 26
PIF_VALUE: 24
PIF_VALUE: 31
PIF_VALUE: 25
PIF_VALUE: 28

## 2022-07-07 NOTE — PLAN OF CARE
Problem: Discharge Planning  Goal: Discharge to home or other facility with appropriate resources  Outcome: Not Progressing  Flowsheets (Taken 7/6/2022 2000)  Discharge to home or other facility with appropriate resources: Identify barriers to discharge with patient and caregiver     Problem: Chronic Conditions and Co-morbidities  Goal: Patient's chronic conditions and co-morbidity symptoms are monitored and maintained or improved  7/6/2022 2111 by Ajay Nassar RN  Outcome: Progressing  Flowsheets (Taken 7/6/2022 2000)  Care Plan - Patient's Chronic Conditions and Co-Morbidity Symptoms are Monitored and Maintained or Improved: Monitor and assess patient's chronic conditions and comorbid symptoms for stability, deterioration, or improvement  7/6/2022 0929 by Cha Linn RN  Outcome: Progressing     Problem: Pain  Goal: Verbalizes/displays adequate comfort level or baseline comfort level  7/6/2022 2111 by Ajay Nassar RN  Outcome: Progressing  Flowsheets (Taken 7/6/2022 2000)  Verbalizes/displays adequate comfort level or baseline comfort level: Assess pain using appropriate pain scale  7/6/2022 0929 by Cha Linn RN  Outcome: Progressing     Problem: Skin/Tissue Integrity  Goal: Absence of new skin breakdown  Description: 1. Monitor for areas of redness and/or skin breakdown  2. Assess vascular access sites hourly  3. Every 4-6 hours minimum:  Change oxygen saturation probe site  4. Every 4-6 hours:  If on nasal continuous positive airway pressure, respiratory therapy assess nares and determine need for appliance change or resting period.   7/6/2022 2111 by Ajay Nassar RN  Outcome: Progressing  7/6/2022 0929 by Cha Linn RN  Outcome: Progressing     Problem: ABCDS Injury Assessment  Goal: Absence of physical injury  7/6/2022 2111 by Ajay Nassar RN  Outcome: Progressing  Flowsheets (Taken 7/6/2022 2000)  Absence of Physical Injury: Implement safety measures based on patient

## 2022-07-07 NOTE — PROGRESS NOTES
Pharmacy Consultation Note  (Antibiotic Dosing and Monitoring)    Initial consult date: 7/6/22  Consulting physician/provider: Dr. Nichols Precise  Drug: Vancomycin  Indication: Empiric    · Vancomycin has been discontinued. Clinical pharmacy will sign off, please reconsult if further assistance is needed.        Tiff Centeno, [facility] 7/7/2022 11:15 AM

## 2022-07-07 NOTE — PROGRESS NOTES
Sam Cogan is a 66 y.o. right handed woman. We are following for new onset seizures    Significant PMH: Parkinson's disease, SERENA on CPAP, HLD, HTN, CKD, breast cancer s/p lumpectomy, anxiety, diabetes, AF on [address], lumbar laminectomy  Pertinent family history: Son has seizures    Synopsis  She follows with me in the office for Parkinson's disease and is on Sinemet and Requip--with fairly good control of motor symptoms. She presented on 7/5 with an altered mental status and hypotension (84/49), hypoxia, dig toxicity, bradycardia (lowest documented was 58), possible pancreatitis, pneumonia, and SHANTANU. She recently had a change in her psychiatric medications and a right toe amputation on 6/17. Witnessed, non-described sz 7/6    HPI  She remains in ICU intubated and sedated. She withdraws to pain but follows no commands. Spoke with RN who stated that when sedation is lowered, patient has tremors and becomes very agitated. No generalized tonic-clonic movements. MRI of the brain is pending. EEG interpretation is pending. RN tells me that apparently patients Parkinson's medications were also stopped after her stay at [facility] unclear reasons. Cardio is following for A. fib with RVR. Anticoagulation is on hold for possible thoracentesis. There is no family present she is otherwise medically stable. Review of systems is limited due to her intubation and sedation.     Allergies   Allergen Reactions    Ciprofloxacin Nausea And Vomiting    Codeine Itching     Creepy crawly \" feelings     Medications:     Current Facility-Administered Medications   Medication Dose Route Frequency Provider Last Rate Last Admin    calcium gluconate 2,000 mg in dextrose 5 % 100 mL IVPB  2,000 mg IntraVENous Once Neeta Cantrell MD        insulin lispro (HUMALOG) injection vial 0-6 Units  0-6 Units SubCUTAneous Q4H Bjorn Cooper MD        fentaNYL 10 mcg/ml in 0.9%  ml infusion   mcg/hr IntraVENous Continuous Robin Santoyo MD 15 mL/hr at 07/07/22 0047 150 mcg/hr at 07/07/22 0047    hydrocortisone sodium succinate PF (SOLU-CORTEF) injection 100 mg  100 mg IntraVENous Q8H Robin Santoyo MD   100 mg at 07/07/22 0259    chlorhexidine (PERIDEX) 0.12 % solution 15 mL  15 mL Mouth/Throat BID Kristi Mcgovern MD   15 mL at 07/07/22 0821    pantoprazole (PROTONIX) 40 mg in sodium chloride (PF) 10 mL injection  40 mg IntraVENous Daily Kristi Mcgovern MD   40 mg at 07/07/22 7216    ipratropium-albuterol (DUONEB) nebulizer solution 1 ampule  1 ampule Inhalation Q4H WA Robin Santoyo MD   1 ampule at 07/06/22 2013    midazolam (VERSED) 1 mg/mL in D5W infusion  1-10 mg/hr IntraVENous Continuous Robin Santoyo MD 2 mL/hr at 07/06/22 2000 2 mg/hr at 07/06/22 2000    vancomycin (VANCOCIN) intermittent dosing (placeholder)   Other RX Robe Santoyo MD        aspirin chewable tablet 81 mg  81 mg Oral Daily Sam Xie MD   81 mg at 07/07/22 4395    polyethylene glycol (GLYCOLAX) packet 17 g  17 g Oral BID Sam Xie MD   17 g at 07/06/22 1200    amiodarone (CORDARONE) tablet 200 mg  200 mg Oral BID Dixie Pollack MD   200 mg at 07/07/22 4282    [START ON 7/14/2022] amiodarone (CORDARONE) tablet 200 mg  200 mg Oral Daily Dixie Pollack MD        norepinephrine (LEVOPHED) 16 mg in dextrose 5% 250 mL infusion  1-100 mcg/min IntraVENous Continuous Brooks Padilla MD 3.8 mL/hr at 07/07/22 0502 4 mcg/min at 07/07/22 0502    [Held by provider] apixaban (ELIQUIS) tablet 5 mg  5 mg Oral BID Karrie Kim MD   5 mg at 07/06/22 0802    atorvastatin (LIPITOR) tablet 20 mg  20 mg Oral Daily Robin Santoyo MD   20 mg at 07/07/22 9697    sodium chloride flush 0.9 % injection 5-40 mL  5-40 mL IntraVENous 2 times per day Robin Santoyo MD   10 mL at 07/06/22 2005    sodium chloride flush 0.9 % injection 5-40 mL response to voice   IX: soft palate elevation     IX,X: gag reflex    XI: trapezius strength     XI: sternocleidomastoid strength    XI: neck extension strength      XII: tongue strength       Motor/sensory:  Withdraws to deep pain in all limbs  Obese bulk; no cogwheeling  No abnormal movements    DTR:  1+ throughout  No Kohler's  Bilateral Babinski's    Laboratory/Radiology:  ry/Radiology:     Lab Results   Component Value Date    WBC 7.6 07/07/2022    HGB 8.6 (L) 07/07/2022    HCT 27.3 (L) 07/07/2022    MCV 82.0 07/07/2022    PLT 77 (L) 07/07/2022    LYMPHOPCT 5.2 (L) 07/07/2022    RBC 3.33 (L) 07/07/2022    MCH 25.8 (L) 07/07/2022    MCHC 31.5 (L) 07/07/2022    RDW 15.9 (H) 07/07/2022     Lab Results   Component Value Date     07/07/2022    K 4.4 07/07/2022    CL 98 07/07/2022    CO2 21 (L) 07/07/2022     (H) 07/07/2022    CREATININE 2.7 (H) 07/07/2022    GLUCOSE 195 (H) 07/07/2022    CALCIUM 6.6 (L) 07/07/2022    PROT 5.1 (L) 07/07/2022    LABALBU 2.8 (L) 07/07/2022    BILITOT 0.5 07/07/2022    ALKPHOS 60 07/07/2022    AST 13 07/07/2022    ALT 8 07/07/2022    LABGLOM 17 07/07/2022    GFRAA 21 07/07/2022     CTH head 7/5: no acute abnormalities    MRI brain WO pending    EEG pending    All labs and images personally reviewed today    Assessment:     Provoked seizure versus Parkinson's tremors: In the setting of respiratory failure, hypotension, dig toxicity, and SHANTANU. Dopaminergics were recently stopped at [facility]-- which likely exacerbated her parkinsonism. With her underlying neurodegenerative disease however, she is more at risk for epilepsy than the general population-- and with her A. fib with RVR she is at risk for ischemic events. Testing is pending. Exam remains skewed due to sedation.     Plan:     Follow up EEG results and await MRI brain    No antiseizure medications for now    Will likely restart dopaminergics once off sedation and extubated    Continue seizure precautions Will follow    RASHARD Adame - [place]   7/7/2022

## 2022-07-07 NOTE — PROGRESS NOTES
Spoke to RN, will do the procedure tomorrow if INR is corrected and aspirin and eliquis held.   Will check in with RN tomorrow morning

## 2022-07-07 NOTE — PLAN OF CARE
Problem: Respiratory - Adult  Goal: Absence of hypoxia  Description: Absence of hypoxia  7/7/2022 1634 by Sarai Oconnor RCP  Outcome: Progressing

## 2022-07-07 NOTE — PLAN OF CARE
Problem: Respiratory - Adult  Goal: Absence of hypoxia  Description: Absence of hypoxia  Outcome: Progressing

## 2022-07-07 NOTE — PROGRESS NOTES
[facility] MD Andry Anders MD    Ricardo Patel MD .  Progress note  Patient's Name: Avril Thomas  9:47 AM  7/7/2022 7/7 : seen in her room intubated mechanically ventilated fio2 40 . LE edema 1-2+ . On levophed . UO ok over last 24 hours       History of Present Ilness:      69-year old female with a past medical history of hypertension, A. fib, CAD, asthma, HFpEF, CKD, SERENA, hyperlipidemia, type II DM, anxiety/depression, Parkinson's disease, restless leg syndrome who presented in the ED for altered mental status.     She recently had a right second toe amputation back last month at [facility].  She was discharged after 2 weeks. According to the patient's family, she was doing okay until few hours prior to admission, patient was noted to be acting different yesterday morning. She was noted to be drowsy    Nephrology asked to see for SHANTANU   I did see pt in ICU she is intubated mechanically ventilated . She is on levophed . She has some LE edema. Vent setting stable .    UO marginal .       Past Medical History:   Diagnosis Date    A-fib ([address]     Acute on chronic congestive heart failure (HCC)     Anxiety     Asthma     CAD (coronary artery disease) 01/21/2016    Cancer ([address]  breast ca 2006    right lumpectomy    Chronic kidney disease     nephrolithiasis    Depression     Diabetes mellitus ([address]     H/O mammogram     Hx MRSA infection     toe infection january 2012    Hyperlipidemia     Hypertension     Lateral epicondylitis     SERENA on CPAP     Parkinson's disease ([address]     Tubal ligation status        Past Surgical History:   Procedure Laterality Date    BREAST LUMPECTOMY      BREAST REDUCTION SURGERY      CARDIAC CATHETERIZATION  4/28/2014    Dr. Roe Lucia  01/11/2022    Dr. Joseph Harris  7/29/15    ENDOSCOPY, COLON, DIAGNOSTIC  7/19/15    GALLBLADDER SURGERY      LUMBAR LAMINECTOMY      TOE AMPUTATION      TONSILLECTOMY      UPPER GASTROINTESTINAL ENDOSCOPY         Family History   Problem Relation Age of Onset    Cancer Mother         Lung Ca    Cancer Father         lung Ca    Diabetes Sister     Heart Disease Sister     Seizures Son     Other Brother         sepsis        reports that she has never smoked. She has never used smokeless tobacco. She reports that she does not drink alcohol and does not use drugs.     Allergies:  Ciprofloxacin and Codeine    Current Medications:    insulin lispro (HUMALOG) injection vial 0-6 Units, Q4H  insulin glargine-yfgn (SEMGLEE-YFGN) injection vial 5 Units, Nightly  fentaNYL 10 mcg/ml in 0.9%  ml infusion, Continuous  hydrocortisone sodium succinate PF (SOLU-CORTEF) injection 100 mg, Q8H  chlorhexidine (PERIDEX) 0.12 % solution 15 mL, BID  pantoprazole (PROTONIX) 40 mg in sodium chloride (PF) 10 mL injection, Daily  ipratropium-albuterol (DUONEB) nebulizer solution 1 ampule, Q4H WA  midazolam (VERSED) 1 mg/mL in D5W infusion, Continuous  vancomycin (VANCOCIN) intermittent dosing (placeholder), RX Placeholder  aspirin chewable tablet 81 mg, Daily  polyethylene glycol (GLYCOLAX) packet 17 g, BID  amiodarone (CORDARONE) tablet 200 mg, BID  [START ON 7/14/2022] amiodarone (CORDARONE) tablet 200 mg, Daily  norepinephrine (LEVOPHED) 16 mg in dextrose 5% 250 mL infusion, Continuous  [Held by provider] apixaban (ELIQUIS) tablet 5 mg, BID  atorvastatin (LIPITOR) tablet 20 mg, Daily  sodium chloride flush 0.9 % injection 5-40 mL, 2 times per day  sodium chloride flush 0.9 % injection 5-40 mL, PRN  0.9 % sodium chloride infusion, PRN  acetaminophen (TYLENOL) tablet 650 mg, Q6H PRN   Or  acetaminophen (TYLENOL) suppository 650 mg, Q6H PRN  piperacillin-tazobactam (ZOSYN) 4,500 mg in dextrose 5 % 100 mL IVPB (Oxiz4Nvl), Q12H  glucose chewable tablet 16 g, PRN  dextrose bolus 10% 125 mL, PRN   Or  dextrose bolus 10% 250 mL, PRN  glucagon (rDNA) injection 1 mg, PRN  dextrose 5 % solution, PRN        Review of Systems:   Unable to obtain due to clinical conditon . Physical exam:   Vital signs /68   Pulse 90   Temp 98.2 °F (36.8 °C) (Bladder)   Resp 14   Ht 5' (1.524 m)   Wt 231 lb (104.8 kg)   SpO2 100%   BMI 45.11 kg/m²   Gen : moderate distress  Head : at , nc   Neck : supple , no thyromegaly noted . Eyes : EOMI , PERRLA   CV : tachycardiac 1+ edema in LE   Lungs: good flow heard b/l   Abd : soft , NT , BS + , No Organomegaly appreciated . Skin : soft, dry . Neuro : CN  II-XII grossly intact , no focal neurologic deficit . Psych : cooperative .      Data:   Labs:  CBC with Differential:    Lab Results   Component Value Date/Time    WBC 7.6 07/07/2022 04:45 AM    RBC 3.33 07/07/2022 04:45 AM    HGB 8.6 07/07/2022 04:45 AM    HCT 27.3 07/07/2022 04:45 AM    PLT 77 07/07/2022 04:45 AM    MCV 82.0 07/07/2022 04:45 AM    MCH 25.8 07/07/2022 04:45 AM    MCHC 31.5 07/07/2022 04:45 AM    RDW 15.9 07/07/2022 04:45 AM    NRBC 0.0 03/15/2019 09:10 AM    SEGSPCT 74 08/20/2013 10:45 AM    LYMPHOPCT 5.2 07/07/2022 04:45 AM    MONOPCT 1.2 07/07/2022 04:45 AM    MYELOPCT 0.9 03/15/2019 09:10 AM    EOSPCT 1 06/16/2017 12:00 AM    BASOPCT 0.1 07/07/2022 04:45 AM    MONOSABS 0.00 07/07/2022 04:45 AM    LYMPHSABS 0.38 07/07/2022 04:45 AM    EOSABS 0.00 07/07/2022 04:45 AM    BASOSABS 0.00 07/07/2022 04:45 AM     CMP:    Lab Results   Component Value Date/Time     07/07/2022 04:45 AM    K 4.4 07/07/2022 04:45 AM    K 5.0 07/06/2022 02:41 AM    CL 98 07/07/2022 04:45 AM    CO2 21 07/07/2022 04:45 AM     07/07/2022 04:45 AM    CREATININE 2.7 07/07/2022 04:45 AM    GFRAA 21 07/07/2022 04:45 AM    LABGLOM 17 07/07/2022 04:45 AM    GLUCOSE 195 07/07/2022 04:45 AM    PROT 5.1 07/07/2022 04:45 AM    LABALBU 2.8 07/07/2022 04:45 AM    CALCIUM 6.6 07/07/2022 04:45 AM    BILITOT 0.5 07/07/2022 04:45 AM    ALKPHOS 60 07/07/2022 04:45 AM    AST XR CHEST PORTABLE   Final Result   Adequately positioned endotracheal tube. Nasogastric tube coursing below   diaphragm. Otherwise, stable exam.         CT ABDOMEN PELVIS WO CONTRAST Additional Contrast? None   Final Result   Increasing fluid within the abdomen when compared to the prior study. The   anasarca is also increased in appearance of the prior examination. Mild stranding seen around the mesentery which correlation to clinical lab   values is recommended to exclude possible pancreatitis or volume overload. Overall the appearance of the pancreas itself is grossly unremarkable. There   is only mild stranding seen throughout the mesenteric root. CT CHEST WO CONTRAST   Final Result   Bilateral lung infiltrates with greatest consolidation right lower lobe   probably secondary to atelectasis and or pneumonia. Moderate right pleural effusion. Cardiomegaly and small pericardial effusion. The pericardial effusion is new. Small volume right upper abdominal ascites along with anasarca. This is new. XR CHEST PORTABLE   Final Result   New opacity on the right which may relate to pleural effusion with adjacent   atelectasis and or infiltrate. Cardiomegaly and pulmonary vascular   congestion implying elevated left heart filling pressures. XR CHEST PORTABLE    (Results Pending)   MRI BRAIN WO CONTRAST    (Results Pending)   XR CHEST PORTABLE    (Results Pending)       Assessment    Acute kidney injury non oligouric:   Baseline cr 0.9  Etiology of SHANTANU due to decrease effective in setting of intravascular volume depletion as evident by her need for levophed and her urine lytes with high avidity for cl and Na .   Basically bland urine sediment which make GN/vasculitis unlikely   High BUN in part due to steroids   LE edema noted though she is still on levophed      Stop iv fluids  Continue hemodynamic support  Guide Abx per pharmacy dosing    With her having stable vent setting would consider holding diuretics another day and allow her azotemia to improve further  Consider intermittent diuretics vs bumex drip next 12-24 hours         -Encephalopathy metabolic multifactorial : per ICU team     -Digoxin toxicity s/p digbind    -Shock R/O septic shock               Electronically signed by Cheyenne Eduardo MD on 7/7/2022 at 9:47 AM

## 2022-07-07 NOTE — PROGRESS NOTES
Comprehensive Nutrition Assessment    Type and Reason for Visit:  Initial,Consult    Nutrition Recommendations/Plan:   1. Tube feeding recommendation per physician consult. Recommend Renal (Nepro) @30/hr plus 1 protein modular daily to provide 720ml, 1296 calories, 58g protein, 522ml water (1400 calories and 84g protein w/ protein modular). Renal formula being recommended d/t elevated phosphorus levels. When renal lab values normalize, recommend Diabetic (Glucerna 1.5) @40/hr to provide 960ml, 1440 calories, 79g protein, 728ml water. Malnutrition Assessment:  Malnutrition Status: At risk for malnutrition (Comment) (07/07/22 1122)    Context:  Acute Illness     Findings of the 6 clinical characteristics of malnutrition:  Energy Intake:  Mild decrease in energy intake (Comment) (x 2 days since admission)  Weight Loss:  No significant weight loss     Body Fat Loss:  Unable to assess     Muscle Mass Loss:  Unable to assess    Fluid Accumulation:  No significant fluid accumulation     Strength:  Not Performed    Nutrition Assessment:    Patient currently NPO ; tube feedings have been ordered although not started yet ; currently intubated and sedated ; adm w/ AMS and acute metabolic encephalopathy (ammonia levels elevated) ; noted shock (septic vs cardiogenic) ; adm w/ pleural effusion and acute hypoxic hypercapnic respiratory failure ; SHANTANU on CKD ; hx of DM/seizures/parkinsons ; hx of CHF/CAD/breast CA ; will provide recommendations    Nutrition Related Findings:    I&Os WNL, ETT, NGT clamped, intubated/sedated, active BS, loose stools, hx of toe amputations, elevated phosphorus ;  Wound Type: Open Wounds,Wound Consult Pending (wounds x 2 noted to toes)       Current Nutrition Intake & Therapies:    Average Meal Intake: NPO     Diet NPO  ADULT TUBE FEEDING; Orogastric; Diabetic; Continuous; 10; Yes; 10; Q 4 hours; 40; 30; Q 4 hours    Anthropometric Measures:  Height: 5' (152.4 cm)  Ideal Body Weight (IBW): 100 lbs (45 kg)    Admission Body Weight: 230 lb (104.3 kg) (7/5, no method)  Current Body Weight: 231 lb (104.8 kg) (7/7, bedscale), 231 % IBW. Weight Source: Bed Scale  Current BMI (kg/m2): 45.1  Usual Body Weight: 227 lb (103 kg) (3/17/22, standing scale)  % Weight Change (Calculated): 1.8  Weight Adjustment For: No Adjustment                 BMI Categories: Obese Class 3 (BMI 40.0 or greater)    Estimated Daily Nutrient Needs:  Energy Requirements Based On: Formula  Weight Used for Energy Requirements: Current  Energy (kcal/day): -1050 (REE 1438 ; minute volume 5.09 ; Tmax 98.6)  Weight Used for Protein Requirements: Ideal  Protein (g/day): 70-85 (1.5-1.8g/kg IBW ; decreased protein needs d/t elevated ammonia levels)  Method Used for Fluid Requirements: 1 ml/kcal  Fluid (ml/day): per critical care    Nutrition Diagnosis:   · Inadequate oral intake related to impaired respiratory function as evidenced by NPO or clear liquid status due to medical condition,intubation,nutrition support - enteral nutrition      Nutrition Interventions:   Food and/or Nutrient Delivery: Continue NPO,Start Tube Feeding  Nutrition Education/Counseling: Education not indicated  Coordination of Nutrition Care: Continue to monitor while inpatient       Goals:  Previous Goal Met: Progressing toward Goal(s)  Goals: Initiate nutrition support,within 2 days       Nutrition Monitoring and Evaluation:   Behavioral-Environmental Outcomes: None Identified  Food/Nutrient Intake Outcomes: Enteral Nutrition Intake/Tolerance  Physical Signs/Symptoms Outcomes: Biochemical Data,GI Status,Diarrhea,Fluid Status or Edema,Hemodynamic Status,Nutrition Focused Physical Findings,Skin,Weight    Discharge Planning:     Too soon to determine     Tiburcio Corbett RD, LD  Contact: [phone]

## 2022-07-07 NOTE — PROGRESS NOTES
[facility]  Internal Medicine Department    Attending Physician Statement:  Oc Quevedo D.O. I have discussed the case, including pertinent history and exam findings with the resident. I agree with the assessment, plan and orders as documented by the resident. Patient here for routine follow up of medical problems. Acute Metabolic Encephalopathy: 2/2 uremia vs infectious vs medication changes vs seizure vs uremia; consider Valproic Acid Induced Encephalopathy 2/2 elevated ammonia level, recent valproic acid use and various other medication changes; continued workup per ICU and neurology consulted; patient had seizure yesterday; seizrue medications held per neurology; EEG ordered and MRI ordered; wean from sedation as able by ICU    Hyperammonianemia: with recent addition of valproic acid by outside facility and worsening confusion and VANE requiring intubation consider L-Carnitine administration with use of     SHANTANU Stage III on CKD: nephrology consulted; continue w/u and care per nephrology; considering diuresis in next 48 hours per their note; conitnue monitoring I/Os    Hypoxic Respiratory Failure: currently intubated and sedated per ICU; wean from ventilator as able    Shock: likely combination of sepsis and cardiogenic shock; continue treatment per ICU; improving with current treatment     Chronic Psychiatric Problems: recently had multiple medications changed at [facility]; appropriate med reconciliation by pharmacy and records from [facility] discharge     Remainder of medical problems as per resident note.

## 2022-07-07 NOTE — PROGRESS NOTES
Inpatient Cardiology Progress note     PATIENT IS BEING FOLLOWED FOR: Afib with RVR and for h/o HFpEF    Modesto Farris is a 66 y.o. female known to me from prior hospitalizations     SUBJECTIVE: Intubated, on vent, sedated and unresponsive  OBJECTIVE: Intubated, on vent, sedated and unresponsive. Breathing with vent. ROS:  Cannot be obtained    PHYSICAL EXAM:   /66   Pulse (!) 104   Temp 98.2 °F (36.8 °C) (Bladder)   Resp 12   Ht 5' (1.524 m)   Wt 231 lb (104.8 kg)   SpO2 99%   BMI 45.11 kg/m²    B/P Range last 24 hours: Systolic (10MLO), ACO:012 , Min:113 , UCB:588    Diastolic (69YFD), ERW:17, Min:66, Max:66    CONST: Well developed, well nourished morbidly obese female who appears of stated age. Orally ntubated, on vent, sedated and unresponsive. Breathing with vent  HEENT:   Head- Normocephalic, atraumatic   Eyes- Conjunctivae pink, anicteric  Throat- Orally intubated  Neck-  No stridor, trachea midline, no jugular venous distention. No carotid bruit  CHEST: Chest symmetrical and non-tender to palpation. No accessory muscle use or intercostal retractions  RESPIRATORY:  Lung sounds - diminished in the lower lung fields. No crackles or wheezes heard   CARDIOVASCULAR:     Heart Inspection- shows no noted pulsations  Heart Palpation- no heaves or thrills; PMI is non-displaced   Heart Ausculation- Irregular rate and rhythm, no murmur. No s3 or rub   PV: No significant lower extremity edema. No varicosities. Pedal pulses palpable, no clubbing or cyanosis. Amputated 3rd and 4th toes bilaterally   ABDOMEN: Soft, non-tender to light palpation. Bowel sounds present. No palpable masses no organomegaly; no abdominal bruit  MS: No atrophy or abnormal movements.    : Deferred  SKIN: Warm and dry no statis dermatitis or ulcers   NEURO / PSYCH: sedated and unresponsive      Intake/Output Summary (Last 24 hours) at 7/7/2022 8537  Last data filed at 7/7/2022 0600  Gross per 24 hour   Intake 1412.31 ml Output 1225 ml   Net 187.31 ml       Weight:   Wt Readings from Last 3 Encounters:   07/07/22 231 lb (104.8 kg)   07/06/22 230 lb (104.3 kg)   05/17/22 227 lb 1.6 oz (103 kg)     Current Inpatient Medications:   calcium gluconate IVPB  2,000 mg IntraVENous Once    insulin lispro  0-6 Units SubCUTAneous Q4H    hydrocortisone sodium succinate PF  100 mg IntraVENous Q8H    chlorhexidine  15 mL Mouth/Throat BID    pantoprazole (PROTONIX) 40 mg injection  40 mg IntraVENous Daily    ipratropium-albuterol  1 ampule Inhalation Q4H WA    vancomycin (VANCOCIN) intermittent dosing (placeholder)   Other RX Placeholder    aspirin  81 mg Oral Daily    polyethylene glycol  17 g Oral BID    lactulose  20 g Oral TID    amiodarone  200 mg Oral BID    [START ON 7/14/2022] amiodarone  200 mg Oral Daily    [Held by provider] apixaban  5 mg Oral BID    atorvastatin  20 mg Oral Daily    sodium chloride flush  5-40 mL IntraVENous 2 times per day    piperacillin-tazobactam  4,500 mg IntraVENous Q12H       IV Infusions (if any):   fentaNYL 150 mcg/hr (07/07/22 0047)    midazolam 2 mg/hr (07/06/22 2000)    norepinephrine 4 mcg/min (07/07/22 0502)    sodium chloride      dextrose         DIAGNOSTIC/ LABORATORY DATA:  Labs:   CBC:   Recent Labs     07/06/22  0634 07/07/22 0445   WBC 7.6 7.6   HGB 9.0* 8.6*   HCT 28.6* 27.3*   PLT 80* 77*     BMP:   Recent Labs     07/06/22 1722 07/07/22 0445    137   K 4.5 4.4   CO2 22 21*   * 104*   CREATININE 2.9* 2.7*   LABGLOM 16 17   CALCIUM 6.9* 6.6*     Mag:   Recent Labs     07/06/22 1722 07/07/22 0445   MG 2.0 2.1     Phos:   Recent Labs     07/06/22 1722 07/07/22 0445   PHOS 6.6* 6.2*     TFT:   Lab Results   Component Value Date    TSH 4.580 (H) 07/06/2022    O2OZTWQ 8.7 07/11/2016    T4FREE 0.97 07/06/2022      HgA1c:   Lab Results   Component Value Date    LABA1C 6.5 (H) 05/12/2022     No results found for: EAG    BNP: No results for input(s): BNP in the last 72 hours. PT/INR:   Recent Labs     07/05/22  1254 07/07/22  0445   PROTIME 33.7* 22.9*   INR 3.1 2.1     APTT:  Recent Labs     07/05/22  1254 07/07/22  0445   APTT 42.9* 33.5     CARDIAC ENZYMES:  Recent Labs     07/05/22  1254 07/05/22  1717 07/06/22  0241   TROPHS 57* 53* 49*     FASTING LIPID PANEL:  Lab Results   Component Value Date/Time    CHOL 95 05/12/2022 01:44 AM    HDL 38 05/12/2022 01:44 AM    LDLCALC 38 05/12/2022 01:44 AM    TRIG 93 05/12/2022 01:44 AM     LIVER PROFILE:  Recent Labs     07/06/22  0634 07/07/22  0445   AST 14 13   ALT 9 8   LABALBU 2.8* 2.8*       12 lead EKG 7/6/22: Afib, rate 98. Low QRS complexes. Diffuse non specific ST changes    CXR 7/6/22:   Adequately positioned right internal jugular central venous line.  Otherwise, no significant change compared to prior exam glued in lines and tubes.     CXR 7/7/22: pending    Telemetry: Afib, rate in the 100's    Echo:    ASSESSMENT:   1. AF with RVR, recurrent. Known history of PAF s/p DCCV in 4/2022 and 5/2022. [address] with Eliquis, currently on hold for possible thoracocentesis  2. Hx of HFpEF. Suspect anasarca and pleural effusion contributed to by  SHANTANU on CKD and hypoalbuminemia. 3. Acute hypoxic respiratory failure, intubated on mechanical ventilation. 4. Reported acute mental status change on presentation and seizures. Currently sedated and non-responsive. 5. Hypotension on Levophed. Hx of hypertension. 6. SHANTANU on top of CKD   7. Digoxin toxicity on presentation treated with Digibind. 8. Chronic anemia  9. Thrombocytopenia. 10. Hypoalbuminemia  11. Elevated ammonia level. 12. Elevated lipase with negative CT abdomen for pancreatitis. 13. Mild to Moderate CAD on [facility] 1/2022, clinically stable   14. Mild MR and mild TR on TTE 4/2022 (with EF 60%)  15. SERENA, not compliant with CPAP  16. Asthma  17. History of tobacco abuse  18. Morbid obesity 44.92   19. Hx of HLD  20. T2DM, insulin requiring. 21.  Hx of Gastric ulcers in 2013  22. Hx of right sided breast CA s/p lumpectomy and radiation therapy. 23. Parkinson's disease. 24. H/o Anxiety/Depression            PLAN:  1. Continue current management per ICU team +- other consultants. 2. Fluid management per nephrology  3. Continue to taper Levophed as tolerated  4. Continue amiodarone as ordered  5. Start low dose lopressor after disconinting Levophed as BP allows.    6. Cardiology will see PRN, please call if needed.         Electronically signed by Edilberto Fuentes MD on 7/7/2022 at 8:22 AM

## 2022-07-07 NOTE — PROGRESS NOTES
[facility]  Department of Internal Medicine   Internal Medicine Residency   MICU Progress Note    Patient:  Servando Baron 66 y.o. female  MRN: [id number]     Date of Service: 7/7/2022    Allergy: Ciprofloxacin and Codeine    Subjective     Patient is seen and examined this morning. She is intubated, sedated and ventilated. She did opened her eyes to pain, had shaking of her B upper and lower extremities, no focal weakness noted. Able to follow some commands. Still on fentanyl and Versed drip for sedation. Still on Levophed 6 mcg/min.  24h change: No acute change overnight  ROS: not able to assess  Objective     VS: /68   Pulse 90   Temp 98.2 °F (36.8 °C) (Bladder)   Resp 14   Ht 5' (1.524 m)   Wt 231 lb (104.8 kg)   SpO2 100%   BMI 45.11 kg/m²   ABP (Arterial line BP): 118/68  ABP Mean (Arterial Line Mean): 83 mmHg    I & O - 24hr:   Intake/Output Summary (Last 24 hours) at 7/7/2022 0913  Last data filed at 7/7/2022 0800  Gross per 24 hour   Intake 1412.31 ml   Output 1315 ml   Net 97.31 ml       Physical Exam:  · General Appearance: intubated, ventilated, sedated  · Neck: no adenopathy, no carotid bruit, no JVD, supple, symmetrical, trachea midline and thyroid not enlarged, symmetric, no tenderness/mass/nodules  · Lung: clear to auscultation bilaterally  · Heart: regular rate and rhythm, S1, S2 normal, no murmur, click, rub or gallop  · Abdomen: soft, non-tender; bowel sounds normal; no masses,  no organomegaly  · Extremities:  extremities normal, atraumatic, no cyanosis or edema  · Musculoskeletal: No joint swelling, no muscle tenderness. ROM normal in all joints of extremities.    · Neurologic: Mental status: intubated, sedated, ventilated, able to follow some commands, opens eyes to pain    Lines     site day    Art line   R Radial 7/6   TLC R IJ 7/6   PICC None    Hemoaccess None    Oxygen:     N/A  Mechanical Ventilation:   Mode: A/C   low tidal   TV: 350 ml RR: *14 PEEP 8 cmH2O   FiO2 40   Pplat: 27 Cs: 31    ABG:     Lab Results   Component Value Date/Time    PH 7.299 07/07/2022 08:54 AM    PCO2 50.0 07/07/2022 08:54 AM    PO2 45.8 07/07/2022 08:54 AM    HCO3 24.0 07/07/2022 08:54 AM    BE -2.6 07/07/2022 08:54 AM    THB 9.8 07/07/2022 08:54 AM    O2SAT 76.8 07/07/2022 08:54 AM        Medications     Infusions: (Fluid, Sedation, Vasopressors)  IVF:    N/A  Vasopressors   Levophed at rate 4 mcg/min  Sedation   Fentanyl at rate of 175;     Nutrition:   NG/OG tube TF type: Glucerna        At rate: 10 ml/h, advance rate by 10 ml/hr every 4 hrs, goal rate of 40 ml/hr  ATB:   Antibiotics  Days   Zosyn 7/5   Vancomycin 7/5         Skin issues: bruise L arm  Patient currently has   Urinary cath  Restraints  DVT prophylaxis/ GI prophylaxis,    PT/OT    Labs     CBC with Differential:    Lab Results   Component Value Date/Time    WBC 7.6 07/07/2022 04:45 AM    RBC 3.33 07/07/2022 04:45 AM    HGB 8.6 07/07/2022 04:45 AM    HCT 27.3 07/07/2022 04:45 AM    PLT 77 07/07/2022 04:45 AM    MCV 82.0 07/07/2022 04:45 AM    MCH 25.8 07/07/2022 04:45 AM    MCHC 31.5 07/07/2022 04:45 AM    RDW 15.9 07/07/2022 04:45 AM    NRBC 0.0 03/15/2019 09:10 AM    SEGSPCT 74 08/20/2013 10:45 AM    LYMPHOPCT 5.2 07/07/2022 04:45 AM    MONOPCT 1.2 07/07/2022 04:45 AM    MYELOPCT 0.9 03/15/2019 09:10 AM    EOSPCT 1 06/16/2017 12:00 AM    BASOPCT 0.1 07/07/2022 04:45 AM    MONOSABS 0.00 07/07/2022 04:45 AM    LYMPHSABS 0.38 07/07/2022 04:45 AM    EOSABS 0.00 07/07/2022 04:45 AM    BASOSABS 0.00 07/07/2022 04:45 AM     CMP:    Lab Results   Component Value Date/Time     07/07/2022 04:45 AM    K 4.4 07/07/2022 04:45 AM    K 5.0 07/06/2022 02:41 AM    CL 98 07/07/2022 04:45 AM    CO2 21 07/07/2022 04:45 AM     07/07/2022 04:45 AM    CREATININE 2.7 07/07/2022 04:45 AM    GFRAA 21 07/07/2022 04:45 AM    LABGLOM 17 07/07/2022 04:45 AM    GLUCOSE 195 07/07/2022 04:45 AM    PROT 5.1 07/07/2022 04:45 AM LABALBU 2.8 07/07/2022 04:45 AM    CALCIUM 6.6 07/07/2022 04:45 AM    BILITOT 0.5 07/07/2022 04:45 AM    ALKPHOS 60 07/07/2022 04:45 AM    AST 13 07/07/2022 04:45 AM    ALT 8 07/07/2022 04:45 AM     PT/INR:    Lab Results   Component Value Date/Time    PROTIME 22.9 07/07/2022 04:45 AM    INR 2.1 07/07/2022 04:45 AM     PTT:    Lab Results   Component Value Date/Time    APTT 33.5 07/07/2022 04:45 AM   [APTT}  U/A:    Lab Results   Component Value Date/Time    COLORU Yellow 07/05/2022 05:43 PM    PROTEINU 30 07/05/2022 05:43 PM    PHUR 5.5 07/05/2022 05:43 PM    WBCUA 1-3 07/05/2022 05:43 PM    RBCUA NONE 07/05/2022 05:43 PM    RBCUA NONE 03/25/2013 09:00 PM    YEAST RARE 10/27/2015 07:18 PM    BACTERIA FEW 07/05/2022 05:43 PM    CLARITYU Clear 07/05/2022 05:43 PM    SPECGRAV 1.025 07/05/2022 05:43 PM    LEUKOCYTESUR Negative 07/05/2022 05:43 PM    UROBILINOGEN 0.2 07/05/2022 05:43 PM    BILIRUBINUR Negative 07/05/2022 05:43 PM    BLOODU Negative 07/05/2022 05:43 PM    GLUCOSEU Negative 07/05/2022 05:43 PM     ABG:    Lab Results   Component Value Date/Time    PH 7.299 07/07/2022 08:54 AM    PCO2 50.0 07/07/2022 08:54 AM    PO2 45.8 07/07/2022 08:54 AM    HCO3 24.0 07/07/2022 08:54 AM    BE -2.6 07/07/2022 08:54 AM    O2SAT 76.8 07/07/2022 08:54 AM     HgBA1c:    Lab Results   Component Value Date/Time    LABA1C 6.5 05/12/2022 01:44 AM     Urine Toxicology:  No components found for: [place], IMARTHC, IOPIATES, IPHENCYC    Imaging Studies:    Chest X-ray 7/7/2022   Increasing infiltrate throughout the right lung persistent left basilar   alveolar infiltrate is well. Resident's Assessment and Plan   ASSESSMENT:  1. Acute encephalopathy, multifactorial 2/2 septic shock, digoxin toxicity, uremia  2. Acute hypoxic hypercapnic respiratory failure 2/2 mod R pleural effusion, possible HAP vs acute decompensated  HFpEF exacerbation.     -2D echo:7/6/2022: EF 70-75%, indeterminate DD, N LV size and function, moderately to  severely dilated LA,  moderately dilated  RV, mildly enlarged RA, RSVP at least 60  mmHg, no AS, MS, mild TR, dilated IVC  3. Pulmonary hypertension likely Type II 2/2 acute decompensated diastolic heart failure, in the setting of Hx SERENA  4. Moderate R pleural effusion, likely 2/2 HAP vs acute decompensated heart failure  5. Shock, likely septic (HAP) less likely cardiogenic (small pericardial effusion on CT)  6. Bradycardia, hypotension 2/2 Digoxin toxicity in the setting of SHANTANU. Digoxin level 3.7>>2  7. Elevated pro-BNP, multifactorial, likely acute decompensated HFpEF, septic shock. 2D echo as above  8. Elevated troponin likely 2/2 demand ischemia  9. Permanent atrial fibrillation, rate controlled. 10. SHANTANU Stage III on CKD multifactorial, pre-renal (hemodynamically mediated, DHN 2/2 diuretic use, poor oral intake); vs ATN 2/2 septic shock  11. Hyperkalemia 2/2 SHANTANU, resolved. K initially 5.5>>4.4  12. HAGMA 2/2 lactic acidosis (hypoperfusion, septic shock?),  uremia 2/2 SHANTANU  13. Hx of asthma  14. Hx of SERENA. Not using CPAP at home  15. Hx of HTN  16. Hx of CAD  17. Hx of Type 2 DM with neuropathy. On Lantus 10 and Novolog SS at home  18. Hx of depression/anxiety. 19. Hx of restless leg syndrome  20. Hx of Parkinson's disease.  On Sinemet and ropinirole at home, which was discontinued upon discharge at [facility]:  -CXR, ABG daily  -CMP daily, BMP, Mg, Phos q12hrs  -PT, INR, APTT daily  -POCT glucose  -SBT, SAT daily  -follow retroperitoneal US  -wean off sedation  -continue fentanyl, wean to keep RAAS -1 to +1  -discontinue Versed drip  -Versed 2 mg q2hrs prn x anxiety/agitation  -continue Levophed drip, wean as tolerated  -continue amiodarone PO per Cardio  -plan to add beta-blocker when off pressors and as BP tolerates  -diurese today (albumin 25 g IV + furosemide 40 mg IV x 1 dose)  -for IR-guided insertion of pigtail for R pleural effusion in AM  -pleural fluid analysis, CXR after procedure  -holding apixaban and aspirin  -continue piperacillin-tazobactam  -discontinue vancomycin  -avoid NSAIDs and other nephrotoxic agents  -start Lantus 5 units today when TF starts then HS, LDSS  -strict I & O  -PCDs  -NPO (hold TF) after MN  -keep HOB 30-45 degrees    PT/OT:   DVT/GI prophylaxis: PCDs/pantoprazole  Disposition: continue current mgt    Ilsa Pollard MD, PGY-2  Attending physician: Dr. Karey Flores    I personally saw, examined and provided care for the patient. Radiographs, labs and medication list were reviewed by me independently. I spoke with bedside nursing, therapists and consultants. Critical care services and times documented are independent of procedures and multidisciplinary rounds with Residents. Additionally comprehensive, multidisciplinary rounds were conducted with the MICU team. The case was discussed in detail and plans for care were established. Review of Residents documentation was conducted and revisions were made as appropriate. I agree with the above documented exam, problem list and plan of care with the following additions:    Remains critically ill intubated on mechanical ventilation  Vasopressor requirement improving  CXR with worsening R effusion - will ask IR for a pigtail tomorrow - eliquis on hold  Tube feeding  Vent adjusted  Empiric Abx  Supportive care    36 minutes of CCT spent with the patient, reviewing the chart including imaging studies, and discussing the case with other health care professionals. This time excludes procedures.      Ab Torres MD

## 2022-07-08 ENCOUNTER — APPOINTMENT (OUTPATIENT)
Dept: GENERAL RADIOLOGY | Age: 72
DRG: 870 | End: 2022-07-08
Payer: MEDICARE

## 2022-07-08 ENCOUNTER — APPOINTMENT (OUTPATIENT)
Dept: CT IMAGING | Age: 72
DRG: 870 | End: 2022-07-08
Payer: MEDICARE

## 2022-07-08 ENCOUNTER — APPOINTMENT (OUTPATIENT)
Dept: MRI IMAGING | Age: 72
DRG: 870 | End: 2022-07-08
Payer: MEDICARE

## 2022-07-08 LAB
AADO2: 138.5 MMHG
ABO/RH: NORMAL
ABO/RH: NORMAL
ALBUMIN SERPL-MCNC: 3.3 G/DL (ref 3.5–5.2)
ALP BLD-CCNC: 55 U/L (ref 35–104)
ALT SERPL-CCNC: 7 U/L (ref 0–32)
ANION GAP SERPL CALCULATED.3IONS-SCNC: 17 MMOL/L (ref 7–16)
ANION GAP SERPL CALCULATED.3IONS-SCNC: 19 MMOL/L (ref 7–16)
ANISOCYTOSIS: ABNORMAL
ANTIBODY SCREEN: NORMAL
APPEARANCE FLUID: CLEAR
APTT: 30.2 SEC (ref 24.5–35.1)
AST SERPL-CCNC: 14 U/L (ref 0–31)
B.E.: -1.1 MMOL/L (ref -3–3)
BASOPHILS ABSOLUTE: 0 E9/L (ref 0–0.2)
BASOPHILS RELATIVE PERCENT: 0 % (ref 0–2)
BILIRUB SERPL-MCNC: 0.4 MG/DL (ref 0–1.2)
BLOOD BANK DISPENSE STATUS: NORMAL
BLOOD BANK PRODUCT CODE: NORMAL
BPU ID: NORMAL
BUN BLDV-MCNC: 104 MG/DL (ref 6–23)
BUN BLDV-MCNC: 96 MG/DL (ref 6–23)
CALCIUM SERPL-MCNC: 7.5 MG/DL (ref 8.6–10.2)
CALCIUM SERPL-MCNC: 7.5 MG/DL (ref 8.6–10.2)
CELL COUNT FLUID TYPE: NORMAL
CHLORIDE BLD-SCNC: 103 MMOL/L (ref 98–107)
CHLORIDE BLD-SCNC: 98 MMOL/L (ref 98–107)
CO2: 22 MMOL/L (ref 22–29)
CO2: 23 MMOL/L (ref 22–29)
COHB: 0.6 % (ref 0–1.5)
COLOR FLUID: NORMAL
CREAT SERPL-MCNC: 2.5 MG/DL (ref 0.5–1)
CREAT SERPL-MCNC: 2.6 MG/DL (ref 0.5–1)
CRITICAL: ABNORMAL
CULTURE, RESPIRATORY: ABNORMAL
CULTURE, RESPIRATORY: ABNORMAL
D DIMER: 337 NG/ML DDU
DATE ANALYZED: ABNORMAL
DATE OF COLLECTION: ABNORMAL
DESCRIPTION BLOOD BANK: NORMAL
EOSINOPHILS ABSOLUTE: 0 E9/L (ref 0.05–0.5)
EOSINOPHILS RELATIVE PERCENT: 0 % (ref 0–6)
FIBRINOGEN: 212 MG/DL (ref 200–400)
FIO2: 40 %
FLUID TYPE: NORMAL
GFR AFRICAN AMERICAN: 22
GFR AFRICAN AMERICAN: 23
GFR NON-AFRICAN AMERICAN: 18 ML/MIN/1.73
GFR NON-AFRICAN AMERICAN: 19 ML/MIN/1.73
GLUCOSE BLD-MCNC: 159 MG/DL (ref 74–99)
GLUCOSE BLD-MCNC: 175 MG/DL (ref 74–99)
GLUCOSE, FLUID: 175 MG/DL
HCO3: 23.4 MMOL/L (ref 22–26)
HCT VFR BLD CALC: 22.4 % (ref 34–48)
HCT VFR BLD CALC: 25.1 % (ref 34–48)
HCT VFR BLD CALC: 25.5 % (ref 34–48)
HEMOGLOBIN: 7.1 G/DL (ref 11.5–15.5)
HEMOGLOBIN: 8 G/DL (ref 11.5–15.5)
HEMOGLOBIN: 8 G/DL (ref 11.5–15.5)
HHB: 3.5 % (ref 0–5)
HYPOCHROMIA: ABNORMAL
IMMATURE GRANULOCYTES #: 0.04 E9/L
IMMATURE GRANULOCYTES #: 0.07 E9/L
IMMATURE GRANULOCYTES #: 0.1 E9/L
IMMATURE GRANULOCYTES %: 1 % (ref 0–5)
IMMATURE GRANULOCYTES %: 1.2 % (ref 0–5)
IMMATURE GRANULOCYTES %: 1.7 % (ref 0–5)
INR BLD: 1.7
LAB: ABNORMAL
LACTATE DEHYDROGENASE: 160 U/L (ref 135–214)
LACTIC ACID: 2.3 MMOL/L (ref 0.5–2.2)
LACTIC ACID: 2.4 MMOL/L (ref 0.5–2.2)
LACTIC ACID: 2.8 MMOL/L (ref 0.5–2.2)
LACTIC ACID: 2.8 MMOL/L (ref 0.5–2.2)
LD, FLUID: 95 U/L
LYMPHOCYTES ABSOLUTE: 0.26 E9/L (ref 1.5–4)
LYMPHOCYTES ABSOLUTE: 0.37 E9/L (ref 1.5–4)
LYMPHOCYTES ABSOLUTE: 0.4 E9/L (ref 1.5–4)
LYMPHOCYTES RELATIVE PERCENT: 6.2 % (ref 20–42)
LYMPHOCYTES RELATIVE PERCENT: 6.4 % (ref 20–42)
LYMPHOCYTES RELATIVE PERCENT: 6.6 % (ref 20–42)
Lab: ABNORMAL
MAGNESIUM: 2.2 MG/DL (ref 1.6–2.6)
MAGNESIUM: 2.2 MG/DL (ref 1.6–2.6)
MCH RBC QN AUTO: 26 PG (ref 26–35)
MCH RBC QN AUTO: 26 PG (ref 26–35)
MCH RBC QN AUTO: 26.5 PG (ref 26–35)
MCHC RBC AUTO-ENTMCNC: 31.4 % (ref 32–34.5)
MCHC RBC AUTO-ENTMCNC: 31.7 % (ref 32–34.5)
MCHC RBC AUTO-ENTMCNC: 31.9 % (ref 32–34.5)
MCV RBC AUTO: 82.1 FL (ref 80–99.9)
MCV RBC AUTO: 82.8 FL (ref 80–99.9)
MCV RBC AUTO: 83.1 FL (ref 80–99.9)
METER GLUCOSE: 154 MG/DL (ref 74–99)
METER GLUCOSE: 168 MG/DL (ref 74–99)
METER GLUCOSE: 178 MG/DL (ref 74–99)
METER GLUCOSE: 192 MG/DL (ref 74–99)
METHB: 0.4 % (ref 0–1.5)
MODE: AC
MONOCYTE, FLUID: 78 %
MONOCYTES ABSOLUTE: 0.11 E9/L (ref 0.1–0.95)
MONOCYTES ABSOLUTE: 0.11 E9/L (ref 0.1–0.95)
MONOCYTES ABSOLUTE: 0.14 E9/L (ref 0.1–0.95)
MONOCYTES RELATIVE PERCENT: 1.8 % (ref 2–12)
MONOCYTES RELATIVE PERCENT: 2.4 % (ref 2–12)
MONOCYTES RELATIVE PERCENT: 2.6 % (ref 2–12)
NEUTROPHIL, FLUID: 22 %
NEUTROPHILS ABSOLUTE: 3.8 E9/L (ref 1.8–7.3)
NEUTROPHILS ABSOLUTE: 5.19 E9/L (ref 1.8–7.3)
NEUTROPHILS ABSOLUTE: 5.45 E9/L (ref 1.8–7.3)
NEUTROPHILS RELATIVE PERCENT: 89.9 % (ref 43–80)
NEUTROPHILS RELATIVE PERCENT: 90 % (ref 43–80)
NEUTROPHILS RELATIVE PERCENT: 90.2 % (ref 43–80)
NUCLEATED CELLS FLUID: 116 /UL
O2 SATURATION: 97.2 % (ref 92–98.5)
O2HB: 95.5 % (ref 94–97)
OPERATOR ID: 1721
ORGANISM: ABNORMAL
OVALOCYTES: ABNORMAL
PATHOLOGIST REVIEW: NORMAL
PATIENT TEMP: 37 C
PCO2: 38.1 MMHG (ref 35–45)
PDW BLD-RTO: 15.8 FL (ref 11.5–15)
PEEP/CPAP: 8 CMH2O
PFO2: 2.32 MMHG/%
PH BLOOD GAS: 7.41 (ref 7.35–7.45)
PHOSPHORUS: 5 MG/DL (ref 2.5–4.5)
PHOSPHORUS: 5.8 MG/DL (ref 2.5–4.5)
PLATELET # BLD: 30 E9/L (ref 130–450)
PLATELET # BLD: 31 E9/L (ref 130–450)
PLATELET # BLD: 32 E9/L (ref 130–450)
PLATELET CONFIRMATION: NORMAL
PMV BLD AUTO: 11.6 FL (ref 7–12)
PMV BLD AUTO: 12 FL (ref 7–12)
PMV BLD AUTO: 12.3 FL (ref 7–12)
PO2: 92.9 MMHG (ref 75–100)
POIKILOCYTES: ABNORMAL
POTASSIUM SERPL-SCNC: 3.3 MMOL/L (ref 3.5–5)
POTASSIUM SERPL-SCNC: 3.7 MMOL/L (ref 3.5–5)
PROTEIN FLUID: 2.1 G/DL
PROTHROMBIN TIME: 19.2 SEC (ref 9.3–12.4)
RBC # BLD: 2.73 E12/L (ref 3.5–5.5)
RBC # BLD: 3.02 E12/L (ref 3.5–5.5)
RBC # BLD: 3.08 E12/L (ref 3.5–5.5)
RBC FLUID: <2000 /UL
RI(T): 1.49
RR MECHANICAL: 14 B/MIN
SMEAR, RESPIRATORY: ABNORMAL
SODIUM BLD-SCNC: 139 MMOL/L (ref 132–146)
SODIUM BLD-SCNC: 143 MMOL/L (ref 132–146)
SOURCE, BLOOD GAS: ABNORMAL
TEAR DROP CELLS: ABNORMAL
THB: 9 G/DL (ref 11.5–16.5)
TIME ANALYZED: 441
TOTAL PROTEIN: 5.1 G/DL (ref 6.4–8.3)
VT MECHANICAL: 350 ML
WBC # BLD: 4.2 E9/L (ref 4.5–11.5)
WBC # BLD: 5.8 E9/L (ref 4.5–11.5)
WBC # BLD: 6.1 E9/L (ref 4.5–11.5)

## 2022-07-08 PROCEDURE — 2000000000 HC ICU R&B

## 2022-07-08 PROCEDURE — 86880 COOMBS TEST DIRECT: CPT

## 2022-07-08 PROCEDURE — 87102 FUNGUS ISOLATION CULTURE: CPT

## 2022-07-08 PROCEDURE — 86900 BLOOD TYPING SEROLOGIC ABO: CPT

## 2022-07-08 PROCEDURE — 88305 TISSUE EXAM BY PATHOLOGIST: CPT

## 2022-07-08 PROCEDURE — 85025 COMPLETE CBC W/AUTO DIFF WBC: CPT

## 2022-07-08 PROCEDURE — 83605 ASSAY OF LACTIC ACID: CPT

## 2022-07-08 PROCEDURE — 87205 SMEAR GRAM STAIN: CPT

## 2022-07-08 PROCEDURE — 84157 ASSAY OF PROTEIN OTHER: CPT

## 2022-07-08 PROCEDURE — 32557 INSERT CATH PLEURA W/ IMAGE: CPT | Performed by: RADIOLOGY

## 2022-07-08 PROCEDURE — 87015 SPECIMEN INFECT AGNT CONCNTJ: CPT

## 2022-07-08 PROCEDURE — 2709999900 CT GUIDED CHEST TUBE

## 2022-07-08 PROCEDURE — 94640 AIRWAY INHALATION TREATMENT: CPT

## 2022-07-08 PROCEDURE — 6370000000 HC RX 637 (ALT 250 FOR IP)

## 2022-07-08 PROCEDURE — 83735 ASSAY OF MAGNESIUM: CPT

## 2022-07-08 PROCEDURE — 99233 SBSQ HOSP IP/OBS HIGH 50: CPT | Performed by: NURSE PRACTITIONER

## 2022-07-08 PROCEDURE — 6370000000 HC RX 637 (ALT 250 FOR IP): Performed by: INTERNAL MEDICINE

## 2022-07-08 PROCEDURE — 71045 X-RAY EXAM CHEST 1 VIEW: CPT

## 2022-07-08 PROCEDURE — 86901 BLOOD TYPING SEROLOGIC RH(D): CPT

## 2022-07-08 PROCEDURE — 85378 FIBRIN DEGRADE SEMIQUANT: CPT

## 2022-07-08 PROCEDURE — 84100 ASSAY OF PHOSPHORUS: CPT

## 2022-07-08 PROCEDURE — 85730 THROMBOPLASTIN TIME PARTIAL: CPT

## 2022-07-08 PROCEDURE — 6360000002 HC RX W HCPCS: Performed by: INTERNAL MEDICINE

## 2022-07-08 PROCEDURE — 6360000002 HC RX W HCPCS

## 2022-07-08 PROCEDURE — P9047 ALBUMIN (HUMAN), 25%, 50ML: HCPCS | Performed by: INTERNAL MEDICINE

## 2022-07-08 PROCEDURE — 85610 PROTHROMBIN TIME: CPT

## 2022-07-08 PROCEDURE — 99233 SBSQ HOSP IP/OBS HIGH 50: CPT | Performed by: INTERNAL MEDICINE

## 2022-07-08 PROCEDURE — 2500000003 HC RX 250 WO HCPCS: Performed by: INTERNAL MEDICINE

## 2022-07-08 PROCEDURE — 82947 ASSAY GLUCOSE BLOOD QUANT: CPT

## 2022-07-08 PROCEDURE — 74018 RADEX ABDOMEN 1 VIEW: CPT

## 2022-07-08 PROCEDURE — 36430 TRANSFUSION BLD/BLD COMPNT: CPT

## 2022-07-08 PROCEDURE — 94003 VENT MGMT INPAT SUBQ DAY: CPT

## 2022-07-08 PROCEDURE — S5553 INSULIN LONG ACTING 5 U: HCPCS | Performed by: INTERNAL MEDICINE

## 2022-07-08 PROCEDURE — 83615 LACTATE (LD) (LDH) ENZYME: CPT

## 2022-07-08 PROCEDURE — 88112 CYTOPATH CELL ENHANCE TECH: CPT

## 2022-07-08 PROCEDURE — 6360000002 HC RX W HCPCS: Performed by: STUDENT IN AN ORGANIZED HEALTH CARE EDUCATION/TRAINING PROGRAM

## 2022-07-08 PROCEDURE — 80048 BASIC METABOLIC PNL TOTAL CA: CPT

## 2022-07-08 PROCEDURE — 85384 FIBRINOGEN ACTIVITY: CPT

## 2022-07-08 PROCEDURE — 70551 MRI BRAIN STEM W/O DYE: CPT

## 2022-07-08 PROCEDURE — 2580000003 HC RX 258

## 2022-07-08 PROCEDURE — 82962 GLUCOSE BLOOD TEST: CPT

## 2022-07-08 PROCEDURE — 83986 ASSAY PH BODY FLUID NOS: CPT

## 2022-07-08 PROCEDURE — 86850 RBC ANTIBODY SCREEN: CPT

## 2022-07-08 PROCEDURE — 87070 CULTURE OTHR SPECIMN AEROBIC: CPT

## 2022-07-08 PROCEDURE — 87206 SMEAR FLUORESCENT/ACID STAI: CPT

## 2022-07-08 PROCEDURE — 82805 BLOOD GASES W/O2 SATURATION: CPT

## 2022-07-08 PROCEDURE — 2500000003 HC RX 250 WO HCPCS

## 2022-07-08 PROCEDURE — 86923 COMPATIBILITY TEST ELECTRIC: CPT

## 2022-07-08 PROCEDURE — 80053 COMPREHEN METABOLIC PANEL: CPT

## 2022-07-08 PROCEDURE — P9073 PLATELETS PHERESIS PATH REDU: HCPCS

## 2022-07-08 PROCEDURE — P9016 RBC LEUKOCYTES REDUCED: HCPCS

## 2022-07-08 PROCEDURE — 36415 COLL VENOUS BLD VENIPUNCTURE: CPT

## 2022-07-08 PROCEDURE — 87116 MYCOBACTERIA CULTURE: CPT

## 2022-07-08 PROCEDURE — 99232 SBSQ HOSP IP/OBS MODERATE 35: CPT | Performed by: INTERNAL MEDICINE

## 2022-07-08 PROCEDURE — 32551 INSERTION OF CHEST TUBE: CPT

## 2022-07-08 PROCEDURE — 0W9930Z DRAINAGE OF RIGHT PLEURAL CAVITY WITH DRAINAGE DEVICE, PERCUTANEOUS APPROACH: ICD-10-PCS | Performed by: RADIOLOGY

## 2022-07-08 PROCEDURE — 2580000003 HC RX 258: Performed by: INTERNAL MEDICINE

## 2022-07-08 RX ORDER — ALBUMIN (HUMAN) 12.5 G/50ML
25 SOLUTION INTRAVENOUS ONCE
Status: COMPLETED | OUTPATIENT
Start: 2022-07-08 | End: 2022-07-08

## 2022-07-08 RX ORDER — SODIUM CHLORIDE 9 MG/ML
INJECTION, SOLUTION INTRAVENOUS PRN
Status: DISCONTINUED | OUTPATIENT
Start: 2022-07-08 | End: 2022-07-11

## 2022-07-08 RX ORDER — LEVALBUTEROL INHALATION SOLUTION 0.63 MG/3ML
0.63 SOLUTION RESPIRATORY (INHALATION) EVERY 4 HOURS PRN
Status: DISCONTINUED | OUTPATIENT
Start: 2022-07-08 | End: 2022-07-27 | Stop reason: HOSPADM

## 2022-07-08 RX ORDER — INSULIN LISPRO 100 [IU]/ML
0-6 INJECTION, SOLUTION INTRAVENOUS; SUBCUTANEOUS EVERY 4 HOURS
Status: DISCONTINUED | OUTPATIENT
Start: 2022-07-08 | End: 2022-07-11

## 2022-07-08 RX ORDER — POTASSIUM CHLORIDE 29.8 MG/ML
40 INJECTION INTRAVENOUS EVERY 4 HOURS
Status: COMPLETED | OUTPATIENT
Start: 2022-07-08 | End: 2022-07-08

## 2022-07-08 RX ORDER — FUROSEMIDE 10 MG/ML
40 INJECTION INTRAMUSCULAR; INTRAVENOUS ONCE
Status: COMPLETED | OUTPATIENT
Start: 2022-07-08 | End: 2022-07-08

## 2022-07-08 RX ORDER — BUDESONIDE 0.5 MG/2ML
0.5 INHALANT ORAL 2 TIMES DAILY
Status: DISCONTINUED | OUTPATIENT
Start: 2022-07-08 | End: 2022-07-27 | Stop reason: HOSPADM

## 2022-07-08 RX ADMIN — SODIUM CHLORIDE, PRESERVATIVE FREE 10 ML: 5 INJECTION INTRAVENOUS at 08:09

## 2022-07-08 RX ADMIN — METOPROLOL TARTRATE 25 MG: 25 TABLET, FILM COATED ORAL at 20:24

## 2022-07-08 RX ADMIN — IPRATROPIUM BROMIDE AND ALBUTEROL SULFATE 1 AMPULE: .5; 2.5 SOLUTION RESPIRATORY (INHALATION) at 08:59

## 2022-07-08 RX ADMIN — MIDAZOLAM 2 MG: 1 INJECTION INTRAMUSCULAR; INTRAVENOUS at 15:05

## 2022-07-08 RX ADMIN — INSULIN GLARGINE-YFGN 5 UNITS: 100 INJECTION, SOLUTION SUBCUTANEOUS at 20:21

## 2022-07-08 RX ADMIN — CHLORHEXIDINE GLUCONATE 15 ML: 1.2 RINSE ORAL at 20:22

## 2022-07-08 RX ADMIN — POTASSIUM CHLORIDE 40 MEQ: 29.8 INJECTION, SOLUTION INTRAVENOUS at 10:01

## 2022-07-08 RX ADMIN — BUDESONIDE 500 MCG: 0.5 SUSPENSION RESPIRATORY (INHALATION) at 19:55

## 2022-07-08 RX ADMIN — INSULIN LISPRO 1 UNITS: 100 INJECTION, SOLUTION INTRAVENOUS; SUBCUTANEOUS at 21:00

## 2022-07-08 RX ADMIN — IPRATROPIUM BROMIDE AND ALBUTEROL SULFATE 1 AMPULE: .5; 2.5 SOLUTION RESPIRATORY (INHALATION) at 12:26

## 2022-07-08 RX ADMIN — PIPERACILLIN AND TAZOBACTAM 4500 MG: 4; .5 INJECTION, POWDER, FOR SOLUTION INTRAVENOUS at 18:51

## 2022-07-08 RX ADMIN — FUROSEMIDE 40 MG: 10 INJECTION, SOLUTION INTRAMUSCULAR; INTRAVENOUS at 11:43

## 2022-07-08 RX ADMIN — Medication 200 MCG/HR: at 17:20

## 2022-07-08 RX ADMIN — PIPERACILLIN AND TAZOBACTAM 4500 MG: 4; .5 INJECTION, POWDER, FOR SOLUTION INTRAVENOUS at 05:01

## 2022-07-08 RX ADMIN — ALBUMIN (HUMAN) 25 G: 0.25 INJECTION, SOLUTION INTRAVENOUS at 11:21

## 2022-07-08 RX ADMIN — HYDROCORTISONE SODIUM SUCCINATE 100 MG: 100 INJECTION, POWDER, FOR SOLUTION INTRAMUSCULAR; INTRAVENOUS at 09:54

## 2022-07-08 RX ADMIN — INSULIN LISPRO 1 UNITS: 100 INJECTION, SOLUTION INTRAVENOUS; SUBCUTANEOUS at 18:50

## 2022-07-08 RX ADMIN — Medication 200 MCG/HR: at 23:47

## 2022-07-08 RX ADMIN — SODIUM CHLORIDE, PRESERVATIVE FREE 10 MG: 5 INJECTION INTRAVENOUS at 09:54

## 2022-07-08 RX ADMIN — IPRATROPIUM BROMIDE AND ALBUTEROL SULFATE 1 AMPULE: .5; 2.5 SOLUTION RESPIRATORY (INHALATION) at 19:55

## 2022-07-08 RX ADMIN — ATORVASTATIN CALCIUM 20 MG: 20 TABLET, FILM COATED ORAL at 10:00

## 2022-07-08 RX ADMIN — HYDROCORTISONE SODIUM SUCCINATE 100 MG: 100 INJECTION, POWDER, FOR SOLUTION INTRAMUSCULAR; INTRAVENOUS at 03:14

## 2022-07-08 RX ADMIN — Medication 200 MCG/HR: at 04:57

## 2022-07-08 RX ADMIN — AMIODARONE HYDROCHLORIDE 200 MG: 200 TABLET ORAL at 20:22

## 2022-07-08 RX ADMIN — SODIUM CHLORIDE, PRESERVATIVE FREE 10 ML: 5 INJECTION INTRAVENOUS at 20:22

## 2022-07-08 RX ADMIN — Medication 200 MCG/HR: at 12:05

## 2022-07-08 RX ADMIN — MIDAZOLAM 2 MG: 1 INJECTION INTRAMUSCULAR; INTRAVENOUS at 08:07

## 2022-07-08 RX ADMIN — METOPROLOL TARTRATE 12.5 MG: 25 TABLET, FILM COATED ORAL at 09:56

## 2022-07-08 RX ADMIN — POLYETHYLENE GLYCOL 3350 17 G: 17 POWDER, FOR SOLUTION ORAL at 09:55

## 2022-07-08 RX ADMIN — IPRATROPIUM BROMIDE AND ALBUTEROL SULFATE 1 AMPULE: .5; 2.5 SOLUTION RESPIRATORY (INHALATION) at 17:06

## 2022-07-08 RX ADMIN — CHLORHEXIDINE GLUCONATE 15 ML: 1.2 RINSE ORAL at 09:59

## 2022-07-08 RX ADMIN — POTASSIUM CHLORIDE 40 MEQ: 29.8 INJECTION, SOLUTION INTRAVENOUS at 12:53

## 2022-07-08 RX ADMIN — AMIODARONE HYDROCHLORIDE 200 MG: 200 TABLET ORAL at 09:55

## 2022-07-08 RX ADMIN — MIDAZOLAM 2 MG: 1 INJECTION INTRAMUSCULAR; INTRAVENOUS at 05:00

## 2022-07-08 RX ADMIN — INSULIN LISPRO 1 UNITS: 100 INJECTION, SOLUTION INTRAVENOUS; SUBCUTANEOUS at 11:04

## 2022-07-08 ASSESSMENT — PULMONARY FUNCTION TESTS
PIF_VALUE: 33
PIF_VALUE: 28
PIF_VALUE: 29
PIF_VALUE: 26
PIF_VALUE: 4
PIF_VALUE: 24
PIF_VALUE: 25
PIF_VALUE: 28
PIF_VALUE: 35
PIF_VALUE: 31
PIF_VALUE: 26
PIF_VALUE: 29
PIF_VALUE: 25
PIF_VALUE: 27
PIF_VALUE: 28
PIF_VALUE: 23
PIF_VALUE: 28
PIF_VALUE: 30
PIF_VALUE: 27
PIF_VALUE: 30
PIF_VALUE: 28
PIF_VALUE: 30
PIF_VALUE: 27
PIF_VALUE: 23
PIF_VALUE: 32
PIF_VALUE: 28
PIF_VALUE: 28
PIF_VALUE: 29
PIF_VALUE: 29
PIF_VALUE: 30

## 2022-07-08 ASSESSMENT — PAIN SCALES - GENERAL
PAINLEVEL_OUTOF10: 0

## 2022-07-08 NOTE — PROGRESS NOTES
[facility]  Internal Medicine Department    Attending Physician Statement:  Denver Brown. D.O. I have discussed the case, including pertinent history and exam findings with the resident. I agree with the assessment, plan and orders as documented by the resident. Patient here for routine follow up of medical problems. Acute Metabolic Encephalopathy: unknown etiology at this time however patient's medications were changed completedly after recent hospitalization; patient opening eyes this AM and being weaned from sedation, improving    Atrial fibrillation with RVR: Beta-blocker and amiodarone per cardiology, Eliquis currently on hold due to plan for thoracentesis    SHANTANU Stage III on CKD: nephrology consulted; continue w/u and care per nephrology; considering diuresis in next 48 hours per their note; conitnue monitoring I/Os    Hypoxic Respiratory Failure: currently intubated and sedated per ICU; wean from ventilator as able, plan for thoracentesis today per ICU    Shock: likely combination of sepsis and cardiogenic shock; continue treatment per ICU; improving with current treatment     Chronic Psychiatric Problems: recently had multiple medications changed at [facility]; appropriate med reconciliation by pharmacy and records from [facility], neurology consulted and recommendations appreciated    Remainder of medical problems as per resident note.

## 2022-07-08 NOTE — PROGRESS NOTES
[facility]  Internal Medicine Residency Program  Progress Note - House Team     Patient:  Shona Wilson 66 y.o. female MRN: [id number]     Date of Service: 7/8/2022     CC: altered mental status  Overnight events: No acute overnight events     Subjective     Patient was seen and examined this morning at bedside. Remains intubated, sedated on fentanyl, off pressor. Patient responds to pain and follow command She remains intubated, sedated and ventilated. She is on 6mcg/min on levophed. She had response to sternal rub. Overnight, no acute event noted. Platelet down to 32 from 77 this AM.    Objective     Physical Exam:  Vitals: /68   Pulse (!) 120   Temp 99.3 °F (37.4 °C)   Resp 14   Ht 5' (1.524 m)   Wt 234 lb (106.1 kg)   SpO2 99%   BMI 45.70 kg/m²     I & O - 24hr: I/O this shift: In: 426 [I.V.:120; Blood:276; NG/GT:30]  Out: 565 [Urine:565]   General Appearance: intubated, sedated  HEENT:  Head: Normal, normocephalic, atraumatic. Neck: no adenopathy, no carotid bruit, no JVD, supple, symmetrical, trachea midline and thyroid not enlarged, symmetric, no tenderness/mass/nodules  Lung: clear to auscultation bilaterally  Heart: irregular rhythm, tachycardia, S1, S2 normal, no murmur, click, rub or gallop  Abdomen: soft, non-tender; bowel sounds normal; no masses,  no organomegaly  Extremities:  edema +2 on BLE  Musculokeletal: No joint swelling, no muscle tenderness. ROM normal in all joints of extremities.    Neurologic: Mental status: intubated, sedated, responds to sternal rub, PERRL, +cough reflex  Subject  Pertinent Labs & Imaging Studies   genesis  CBC with Differential:    Lab Results   Component Value Date/Time    WBC 6.1 07/08/2022 09:43 AM    RBC 3.08 07/08/2022 09:43 AM    HGB 8.0 07/08/2022 09:43 AM    HCT 25.5 07/08/2022 09:43 AM    PLT 31 07/08/2022 09:43 AM    MCV 82.8 07/08/2022 09:43 AM    MCH 26.0 07/08/2022 09:43 AM    MCHC 31.4 07/08/2022 09:43 AM    RDW 15.8 prior exam glued in lines and tubes. US DUP LOWER EXTREMITIES BILATERAL VENOUS   Final Result   Within the visualized vessels there is no evidence for deep venous   thrombosis               XR CHEST PORTABLE   Final Result   1. No significant change. Cardiomegaly with right pleural effusion. 2.  Support tubes remain in appropriate position. CT HEAD WO CONTRAST   Final Result   No acute intracranial abnormality or significant change in the overall   appearance of the brain. MRI would be useful if symptoms persist.      RECOMMENDATIONS:   Unavailable         XR ABDOMEN FOR NG/OG/NE TUBE PLACEMENT   Final Result   Catheter is in the proximal stomach. XR CHEST PORTABLE   Final Result   Adequately positioned endotracheal tube. Nasogastric tube coursing below   diaphragm. Otherwise, stable exam.         CT ABDOMEN PELVIS WO CONTRAST Additional Contrast? None   Final Result   Increasing fluid within the abdomen when compared to the prior study. The   anasarca is also increased in appearance of the prior examination. Mild stranding seen around the mesentery which correlation to clinical lab   values is recommended to exclude possible pancreatitis or volume overload. Overall the appearance of the pancreas itself is grossly unremarkable. There   is only mild stranding seen throughout the mesenteric root. CT CHEST WO CONTRAST   Final Result   Bilateral lung infiltrates with greatest consolidation right lower lobe   probably secondary to atelectasis and or pneumonia. Moderate right pleural effusion. Cardiomegaly and small pericardial effusion. The pericardial effusion is new. Small volume right upper abdominal ascites along with anasarca. This is new. XR CHEST PORTABLE   Final Result   New opacity on the right which may relate to pleural effusion with adjacent   atelectasis and or infiltrate.   Cardiomegaly and pulmonary vascular   congestion implying elevated left heart filling pressures. MRI BRAIN WO CONTRAST    (Results Pending)   XR CHEST PORTABLE    (Results Pending)   CT GUIDED CHEST TUBE    (Results Pending)   XR CHEST PORTABLE    (Results Pending)        Resident's Assessment and Plan     Assessment and Plan:    Acute encephalopathy, multifactorial 2/2 shock, digoxin toxicity, uremia, hyperammonemia, recent change of medications; r/o other causes   Came in for AMS with hypotension not improved with fluids  Dig level 3.7  Ammonia 69  BUN 95 on presentation  CT head negative for any acute abnormality  Shock, likely septic (unknown etiology, likely HAP) vs cardiogenic (pericardial effusion)  Concerns for septic shock; although no WBC or fever but pt presented with hypotension, AMS; Procal 0.22, unclear source of infection, concerns for acute pancreatitis vs R toe wound vs pneumonia; qSOFA: 2 (high risk)   Concerns for cardiogenic; small pericardial effusion seen on imaging, possible acute exacerbation of HF  Echo showed EF 70-75%, indeterminate DD, normal LV size and functio  Elevated troponin likely 2/2 demand ischemia -troponin 57 -> 53; EKG showed no ischemic changes  Atrial fibrillation, on eliquis 5mg BID and metoprolol 50mg BID  Bradycardia likely 2/2 digoxin toxicity in the setting of SHANTANU, digoxin level improving, current tachycardia  Pulmonary HTN likely Type II 2/2 acute decompensated heart failure  Acute hypoxic hypercapnic respiratory failure 2/2 pleural effusion, possible HAP vs pericardial effusion vs acute HFpEF exacerbation  Pleural effusion as well as small pericardial effusion seen on imaging  proBNP 41,368  PE ruled out, US BLE negative for DVT  Acute pancreatitis; lipase 148; CT A/P showed mild stranding around the mesentery concerning for pancreatitis versus volume overload, although overall appearance of pancreas itself is grossly unremarkable.   SHANTANU Stage III on CKD - baseline Crea 0.8-0.9, sCr 3-3 on presentation; FEUrea: 6.5% -> prerenal likely multifactorial, prerenal (cardiorenal syndrome, dehydration) vs ATN in the setting of septic shock  Hyperkalemia - resolved; K 5.5 -> 3.3  HAGMA 2/2 lactic acidosis, uremia AG 15 -> 23; lactic acid and uremia elevated  Elevated INR  Hx of Type 2 DM with neuropathy, lantus 10 and sliding scale at home  Hx of asthma, on montelukast 10mg daily  Hx of SERENA  Hx of HTN   Hx of CAD  Hx of HFpEF (EF  60% on echo in 4/2022); on furosemide 20mg daily and metoprolol 50mg BID  Hx of hypothyroidism   Hx of depression/anxiety, on seroquel 25mg in AM and 50mg in PM; divalproex 250mg BID  Hx of restless leg syndrome, on ropinirole 1mg TID at home  Hx of Parkinson's disease, was previously on carbi-dopa; discontinued upon discharge at [address]:  Care per ICU team  Weaning sedation, monitor mentation  Follow EEG and MRI brain  Depakote for agitation and restart Sinemet post extubation per neurology  Monitor BP off pressor  Continue hydrocrotisone  Cardio consulted, appreciate recommendations  Continue amiodarone   Start lopressor 25 mg BID for A fib RVR  Plan for IR-guided pig tail insertion for R pleural effusion if platelet better after transfusion.  Follow thoracentesis results  Hold eliquis and aspirin  Follow PBS, D-dimer, fibrinogen  Continue Zosyn  Continue on lantus 5u and LDSS  Monitor BG q4h  Hypoglycemia protocol in place  Appreciate input from specialties and primary     PT/OT evaluation: not indicated at this time  DVT prophylaxis/ GI prophylaxis: eliquis on hold/protonix  Disposition: continue current care    Juani Tony MD, PGY-3  Attending physician: Dr. Rajeev Lugo

## 2022-07-08 NOTE — PROGRESS NOTES
Consent for right chest tube insertion obtained via telephone from son, Ann Najera. Verified with Mary Kamara.

## 2022-07-08 NOTE — PLAN OF CARE
Problem: Discharge Planning  Goal: Discharge to home or other facility with appropriate resources  Outcome: Not Progressing  Flowsheets (Taken 7/7/2022 2000)  Discharge to home or other facility with appropriate resources: Identify barriers to discharge with patient and caregiver     Problem: Chronic Conditions and Co-morbidities  Goal: Patient's chronic conditions and co-morbidity symptoms are monitored and maintained or improved  7/7/2022 2043 by Rut Bejarano RN  Outcome: Progressing  Flowsheets (Taken 7/7/2022 2000)  Care Plan - Patient's Chronic Conditions and Co-Morbidity Symptoms are Monitored and Maintained or Improved: Monitor and assess patient's chronic conditions and comorbid symptoms for stability, deterioration, or improvement  7/7/2022 1118 by Christopher Velasquez RN  Outcome: Progressing     Problem: Pain  Goal: Verbalizes/displays adequate comfort level or baseline comfort level  7/7/2022 2043 by Rut Bejarano RN  Outcome: Progressing  Flowsheets (Taken 7/7/2022 2000)  Verbalizes/displays adequate comfort level or baseline comfort level: Assess pain using appropriate pain scale  7/7/2022 1118 by Christopher Velasquez RN  Outcome: Progressing     Problem: Skin/Tissue Integrity  Goal: Absence of new skin breakdown  Description: 1. Monitor for areas of redness and/or skin breakdown  2. Assess vascular access sites hourly  3. Every 4-6 hours minimum:  Change oxygen saturation probe site  4. Every 4-6 hours:  If on nasal continuous positive airway pressure, respiratory therapy assess nares and determine need for appliance change or resting period.   7/7/2022 2043 by uRt Bejarano RN  Outcome: Progressing  7/7/2022 1118 by Christopher Velasquez RN  Outcome: Progressing     Problem: ABCDS Injury Assessment  Goal: Absence of physical injury  7/7/2022 2043 by Rut Bejarano RN  Outcome: Progressing  Flowsheets (Taken 7/7/2022 2000)  Absence of Physical Injury: Implement safety measures based on patient assessment  7/7/2022 1118 by Tj Concepcion RN  Outcome: Progressing  Flowsheets (Taken 7/7/2022 4692)  Absence of Physical Injury: Implement safety measures based on patient assessment     Problem: Safety - Medical Restraint  Goal: Remains free of injury from restraints (Restraint for Interference with Medical Device)  Description: INTERVENTIONS:  1. Determine that other, less restrictive measures have been tried or would not be effective before applying the restraint  2. Evaluate the patient's condition at the time of restraint application  3. Inform patient/family regarding the reason for restraint  4.  Q2H: Monitor safety, psychosocial status, comfort, nutrition and hydration  7/7/2022 2043 by Lg Tenorio RN  Outcome: Progressing  Flowsheets (Taken 7/7/2022 2000)  Remains free of injury from restraints (restraint for interference with medical device): Determine that other, less restrictive measures have been tried or would not be effective before applying the restraint  7/7/2022 1118 by Tj Concepcion RN  Outcome: Progressing     Problem: Safety - Adult  Goal: Free from fall injury  7/7/2022 2043 by Lg Tenorio RN  Outcome: Progressing  Flowsheets (Taken 7/7/2022 2000)  Free From Fall Injury: Instruct family/caregiver on patient safety  7/7/2022 1118 by Tj Concepcion RN  Outcome: Progressing  Flowsheets (Taken 7/7/2022 0851)  Free From Fall Injury: Instruct family/caregiver on patient safety     Problem: Respiratory - Adult  Goal: Absence of hypoxia  Description: Absence of hypoxia  7/7/2022 1634 by Siomara Ambrocio RCP  Outcome: Progressing  7/7/2022 0904 by Idalmis Hernandez RCP  Outcome: Progressing     Problem: Nutrition Deficit:  Goal: Optimize nutritional status  Outcome: Progressing

## 2022-07-08 NOTE — PROGRESS NOTES
[facility] MD Juve Badillo MD   84 Ricardo Patel MD .  Progress note  Patient's Name: Joshua Poster  3:15 PM  7/8/2022 7/7 : seen in her room intubated mechanically ventilated fio2 40 . LE edema 1-2+ . On levophed . UO ok over last 24 hours     7/8 pt not in her room when coming to see her . She is in IR lab. Case d/w RN     History of Present Ilness:      69-year old female with a past medical history of hypertension, A. fib, CAD, asthma, HFpEF, CKD, SERENA, hyperlipidemia, type II DM, anxiety/depression, Parkinson's disease, restless leg syndrome who presented in the ED for altered mental status.     She recently had a right second toe amputation back last month at [facility].  She was discharged after 2 weeks. According to the patient's family, she was doing okay until few hours prior to admission, patient was noted to be acting different yesterday morning. She was noted to be drowsy    Nephrology asked to see for SHANTANU   I did see pt in ICU she is intubated mechanically ventilated . She is on levophed . She has some LE edema. Vent setting stable .    UO marginal .       Past Medical History:   Diagnosis Date    A-fib ([address]     Acute on chronic congestive heart failure (HCC)     Anxiety     Asthma     CAD (coronary artery disease) 01/21/2016    Cancer ([address]  breast ca 2006    right lumpectomy    Chronic kidney disease     nephrolithiasis    Depression     Diabetes mellitus ([address]     H/O mammogram     Hx MRSA infection     toe infection january 2012    Hyperlipidemia     Hypertension     Lateral epicondylitis     SERENA on CPAP     Parkinson's disease ([address]     Tubal ligation status        Past Surgical History:   Procedure Laterality Date    BREAST LUMPECTOMY      BREAST REDUCTION SURGERY      CARDIAC CATHETERIZATION  4/28/2014    Dr. Benny Trivedi  01/11/2022    Dr. Dustin García  7/29/15    ENDOSCOPY, COLON, DIAGNOSTIC  7/19/15    GALLBLADDER SURGERY      LUMBAR LAMINECTOMY      TOE AMPUTATION      TONSILLECTOMY      UPPER GASTROINTESTINAL ENDOSCOPY         Family History   Problem Relation Age of Onset    Cancer Mother         Lung Ca    Cancer Father         lung Ca    Diabetes Sister     Heart Disease Sister     Seizures Son     Other Brother         sepsis        reports that she has never smoked. She has never used smokeless tobacco. She reports that she does not drink alcohol and does not use drugs.     Allergies:  Ciprofloxacin and Codeine    Current Medications:    potassium chloride 40 mEq in 100 mL IVPB (Central Line), Q4H  famotidine (PEPCID) 10 mg in sodium chloride (PF) 10 mL injection, Daily  metoprolol tartrate (LOPRESSOR) tablet 12.5 mg, Q6H  0.9 % sodium chloride infusion, PRN  hydrocortisone sodium succinate PF (SOLU-CORTEF) injection 50 mg, Q8H  budesonide (PULMICORT) nebulizer suspension 500 mcg, BID  levalbuterol (XOPENEX) nebulization 0.63 mg, Q4H PRN  insulin lispro (HUMALOG) injection vial 0-6 Units, Q4H  insulin glargine-yfgn (SEMGLEE-YFGN) injection vial 5 Units, Nightly  midazolam PF (VERSED) injection 2 mg, Q2H PRN  fentaNYL 10 mcg/ml in 0.9%  ml infusion, Continuous  chlorhexidine (PERIDEX) 0.12 % solution 15 mL, BID  ipratropium-albuterol (DUONEB) nebulizer solution 1 ampule, Q4H WA  [Held by provider] aspirin chewable tablet 81 mg, Daily  polyethylene glycol (GLYCOLAX) packet 17 g, BID  amiodarone (CORDARONE) tablet 200 mg, BID  [START ON 7/14/2022] amiodarone (CORDARONE) tablet 200 mg, Daily  norepinephrine (LEVOPHED) 16 mg in dextrose 5% 250 mL infusion, Continuous  [Held by provider] apixaban (ELIQUIS) tablet 5 mg, BID  atorvastatin (LIPITOR) tablet 20 mg, Daily  sodium chloride flush 0.9 % injection 5-40 mL, 2 times per day  sodium chloride flush 0.9 % injection 5-40 mL, PRN  0.9 % sodium chloride infusion, PRN  acetaminophen (TYLENOL) tablet 650 mg, Q6H PRN   Or  acetaminophen (TYLENOL) suppository 650 mg, Q6H PRN  piperacillin-tazobactam (ZOSYN) 4,500 mg in dextrose 5 % 100 mL IVPB ([id number]), Q12H  glucose chewable tablet 16 g, PRN  dextrose bolus 10% 125 mL, PRN   Or  dextrose bolus 10% 250 mL, PRN  glucagon (rDNA) injection 1 mg, PRN  dextrose 5 % solution, PRN        Review of Systems:   Unable to obtain due to clinical conditon . Physical exam:   Vital signs /60   Pulse (!) 114   Temp 99.3 °F (37.4 °C)   Resp 14   Ht 5' (1.524 m)   Wt 234 lb (106.1 kg)   SpO2 97%   BMI 45.70 kg/m²   Gen : moderate distress  Head : at , nc   Neck : supple , no thyromegaly noted . Eyes : EOMI , PERRLA   CV : tachycardiac 1+ edema in LE   Lungs: good flow heard b/l   Abd : soft , NT , BS + , No Organomegaly appreciated . Skin : soft, dry . Neuro : CN  II-XII grossly intact , no focal neurologic deficit . Psych : cooperative .      Data:   Labs:  CBC with Differential:    Lab Results   Component Value Date/Time    WBC 6.1 07/08/2022 09:43 AM    RBC 3.08 07/08/2022 09:43 AM    HGB 8.0 07/08/2022 09:43 AM    HCT 25.5 07/08/2022 09:43 AM    PLT 31 07/08/2022 09:43 AM    MCV 82.8 07/08/2022 09:43 AM    MCH 26.0 07/08/2022 09:43 AM    MCHC 31.4 07/08/2022 09:43 AM    RDW 15.8 07/08/2022 09:43 AM    NRBC 0.0 03/15/2019 09:10 AM    SEGSPCT 74 08/20/2013 10:45 AM    LYMPHOPCT 6.6 07/08/2022 09:43 AM    MONOPCT 1.8 07/08/2022 09:43 AM    MYELOPCT 0.9 03/15/2019 09:10 AM    EOSPCT 1 06/16/2017 12:00 AM    BASOPCT 0.0 07/08/2022 09:43 AM    MONOSABS 0.11 07/08/2022 09:43 AM    LYMPHSABS 0.40 07/08/2022 09:43 AM    EOSABS 0.00 07/08/2022 09:43 AM    BASOSABS 0.00 07/08/2022 09:43 AM     CMP:    Lab Results   Component Value Date/Time     07/08/2022 04:35 AM    K 3.3 07/08/2022 04:35 AM    K 5.0 07/06/2022 02:41 AM    CL 98 07/08/2022 04:35 AM    CO2 22 07/08/2022 04:35 AM     07/08/2022 04:35 AM    CREATININE 2.6 07/08/2022 04:35 AM    GFRAA 22 07/08/2022 04:35 AM    LABGLOM 18 07/08/2022 04:35 AM    GLUCOSE 159 07/08/2022 04:35 AM    PROT 5.1 07/08/2022 04:35 AM    LABALBU 3.3 07/08/2022 04:35 AM    CALCIUM 7.5 07/08/2022 04:35 AM    BILITOT 0.4 07/08/2022 04:35 AM    ALKPHOS 55 07/08/2022 04:35 AM    AST 14 07/08/2022 04:35 AM    ALT 7 07/08/2022 04:35 AM     Ionized Calcium:  No results found for: IONCA  Magnesium:    Lab Results   Component Value Date/Time    MG 2.2 07/08/2022 04:35 AM     Phosphorus:    Lab Results   Component Value Date/Time    PHOS 5.8 07/08/2022 04:35 AM     U/A:    Lab Results   Component Value Date/Time    COLORU Yellow 07/05/2022 05:43 PM    PHUR 5.5 07/05/2022 05:43 PM    WBCUA 1-3 07/05/2022 05:43 PM    RBCUA NONE 07/05/2022 05:43 PM    RBCUA NONE 03/25/2013 09:00 PM    YEAST RARE 10/27/2015 07:18 PM    BACTERIA FEW 07/05/2022 05:43 PM    CLARITYU Clear 07/05/2022 05:43 PM    SPECGRAV 1.025 07/05/2022 05:43 PM    LEUKOCYTESUR Negative 07/05/2022 05:43 PM    UROBILINOGEN 0.2 07/05/2022 05:43 PM    BILIRUBINUR Negative 07/05/2022 05:43 PM    BLOODU Negative 07/05/2022 05:43 PM    GLUCOSEU Negative 07/05/2022 05:43 PM     Microalbumen/Creatinine ratio:  No components found for: RUCREAT  Iron Saturation:  No components found for: PERCENTFE  TIBC:    Lab Results   Component Value Date/Time    TIBC 349 05/12/2022 01:44 AM     FERRITIN:  No results found for: FERRITIN     Imaging:  XR CHEST PORTABLE   Final Result   1. There is no interval change in the bilateral perihilar and lower lobe   airspace disease more prominent within the right lung. 2. Moderate size right pleural effusion. 3. There is no pneumothorax. US RETROPERITONEAL COMPLETE   Final Result   1. Marked bilateral renal cortical thinning. Echogenic renal parenchyma. No hydronephrosis. 2.  A Diaz catheter appears to be decompressing the bladder.          XR CHEST PORTABLE   Final Result   Increasing infiltrate throughout the right lung persistent left basilar   alveolar infiltrate is well. XR CHEST PORTABLE   Final Result   Adequately positioned right internal jugular central venous line. Otherwise,   no significant change compared to prior exam glued in lines and tubes. US DUP LOWER EXTREMITIES BILATERAL VENOUS   Final Result   Within the visualized vessels there is no evidence for deep venous   thrombosis               XR CHEST PORTABLE   Final Result   1. No significant change. Cardiomegaly with right pleural effusion. 2.  Support tubes remain in appropriate position. CT HEAD WO CONTRAST   Final Result   No acute intracranial abnormality or significant change in the overall   appearance of the brain. MRI would be useful if symptoms persist.      RECOMMENDATIONS:   Unavailable         XR ABDOMEN FOR NG/OG/NE TUBE PLACEMENT   Final Result   Catheter is in the proximal stomach. XR CHEST PORTABLE   Final Result   Adequately positioned endotracheal tube. Nasogastric tube coursing below   diaphragm. Otherwise, stable exam.         CT ABDOMEN PELVIS WO CONTRAST Additional Contrast? None   Final Result   Increasing fluid within the abdomen when compared to the prior study. The   anasarca is also increased in appearance of the prior examination. Mild stranding seen around the mesentery which correlation to clinical lab   values is recommended to exclude possible pancreatitis or volume overload. Overall the appearance of the pancreas itself is grossly unremarkable. There   is only mild stranding seen throughout the mesenteric root. CT CHEST WO CONTRAST   Final Result   Bilateral lung infiltrates with greatest consolidation right lower lobe   probably secondary to atelectasis and or pneumonia. Moderate right pleural effusion. Cardiomegaly and small pericardial effusion. The pericardial effusion is new. Small volume right upper abdominal ascites along with anasarca. This is new.          XR CHEST PORTABLE   Final Result   New opacity on the right which may relate to pleural effusion with adjacent   atelectasis and or infiltrate. Cardiomegaly and pulmonary vascular   congestion implying elevated left heart filling pressures. MRI BRAIN WO CONTRAST    (Results Pending)   IR CHEST TUBE INSERTION    (Results Pending)   XR CHEST PORTABLE    (Results Pending)       Assessment    Acute kidney injury non oligouric:   Baseline cr 0.9  Etiology of SHANTANU due to decrease effective in setting of intravascular volume depletion as evident by her need for levophed and her urine lytes with high avidity for cl and Na .   Basically bland urine sediment which make GN/vasculitis unlikely   High BUN in part due to steroids   LE edema noted though she is still on levophed        Continue hemodynamic support  Guide Abx per pharmacy dosing    Ok for diuretics as hemodynamically tolerated and clinically needed          -Encephalopathy metabolic multifactorial : per ICU team     -Digoxin toxicity s/p digbind    -Shock R/O septic shock               Electronically signed by Rigoberto Santana MD on 7/8/2022 at 3:15 PM

## 2022-07-08 NOTE — PROGRESS NOTES
Inpatient Cardiology Progress note     PATIENT IS BEING FOLLOWED FOR: Afib with RVR and for h/o HFpEF    Lenny Dorsey is a 66 y.o. female known to me from prior hospitalizations     SUBJECTIVE: Intubated, on vent, sedated on IV Fentanyl, awake, follows simple commands. In soft restraints  OBJECTIVE: Intubated, on vent, sedated on IV Fentanyl, awake, follows simple commands. In soft restraints. Off Levophed. Received IV albumin /IV Lasix yesterday and put out ~ 1400 cc      ROS:  Cannot be obtained    PHYSICAL EXAM:   /65   Pulse (!) 119   Temp 99 °F (37.2 °C)   Resp 19   Ht 5' (1.524 m)   Wt 234 lb (106.1 kg)   SpO2 97%   BMI 45.70 kg/m²    B/P Range last 24 hours: Systolic (82UEC), QHF:015 , Min:130 , RQA:687    Diastolic (55FHH), MXB:63, Min:65, Max:65    CONST: Well developed, well nourished morbidly obese female who appears of stated age. Orally ntubated, on vent, sedated but awake and follow simple commands. In soft restraints. HEENT:   Head- Normocephalic, atraumatic   Eyes- Conjunctivae pink, anicteric  Throat- Orally intubated. Right IJ TLC  Neck-  No stridor, trachea midline, no jugular venous distention. No carotid bruit  CHEST: Chest symmetrical and non-tender to palpation. No accessory muscle use or intercostal retractions  RESPIRATORY:  Lung sounds - diminished in the lower lung fields. No crackles or wheezes heard   CARDIOVASCULAR:     Heart Inspection- shows no noted pulsations  Heart Palpation- no heaves or thrills; PMI is non-displaced   Heart Ausculation- Irregular rate and rhythm, no murmur. No s3 or rub   PV: No significant lower extremity edema. No varicosities. Pedal pulses palpable, no clubbing or cyanosis. Amputated 3rd and 4th toes bilaterally   ABDOMEN: Soft, non-tender to light palpation. Bowel sounds present. No palpable masses no organomegaly; no abdominal bruit  MS: No atrophy or abnormal movements.    : Deferred  SKIN: Warm and dry no statis dermatitis or ulcers   NEURO / PSYCH: sedated, awake, follow simple commands.  Moves all extremities      Intake/Output Summary (Last 24 hours) at 7/8/2022 1201  Last data filed at 7/8/2022 1100  Gross per 24 hour   Intake 320 ml   Output 1590 ml   Net -1270 ml       Weight:   Wt Readings from Last 3 Encounters:   07/08/22 234 lb (106.1 kg)   07/06/22 230 lb (104.3 kg)   05/17/22 227 lb 1.6 oz (103 kg)     Current Inpatient Medications:   potassium chloride  40 mEq IntraVENous Q4H    famotidine (PEPCID) injection  10 mg IntraVENous Daily    metoprolol tartrate  12.5 mg Per NG tube Q6H    hydrocortisone sodium succinate PF  50 mg IntraVENous Q8H    albumin human  25 g IntraVENous Once    insulin lispro  0-6 Units SubCUTAneous Q4H    insulin glargine  5 Units SubCUTAneous Nightly    chlorhexidine  15 mL Mouth/Throat BID    ipratropium-albuterol  1 ampule Inhalation Q4H WA    [Held by provider] aspirin  81 mg Oral Daily    polyethylene glycol  17 g Oral BID    amiodarone  200 mg Oral BID    [START ON 7/14/2022] amiodarone  200 mg Oral Daily    [Held by provider] apixaban  5 mg Oral BID    atorvastatin  20 mg Oral Daily    sodium chloride flush  5-40 mL IntraVENous 2 times per day    piperacillin-tazobactam  4,500 mg IntraVENous Q12H       IV Infusions (if any):   sodium chloride      fentaNYL 200 mcg/hr (07/08/22 0457)    norepinephrine 4 mcg/min (07/07/22 0502)    sodium chloride      dextrose         DIAGNOSTIC/ LABORATORY DATA:  Labs:   CBC:   Recent Labs     07/08/22  0435 07/08/22  0943   WBC 5.8 6.1   HGB 8.0* 8.0*   HCT 25.1* 25.5*   PLT 32* 31*     BMP:   Recent Labs     07/07/22 1722 07/08/22  0435    139   K 3.9 3.3*   CO2 22 22   * 104*   CREATININE 2.7* 2.6*   LABGLOM 17 18   CALCIUM 7.1* 7.5*     Mag:   Recent Labs     07/07/22  1722 07/08/22  0435   MG 2.2 2.2     Phos:   Recent Labs     07/07/22 1722 07/08/22  0435   PHOS 6.2* 5.8*     TFT:   Lab Results   Component Value Date TSH 4.580 (H) 07/06/2022    M9MNCKL 8.7 07/11/2016    T4FREE 0.97 07/06/2022      HgA1c:   Lab Results   Component Value Date    LABA1C 6.5 (H) 05/12/2022     No results found for: EAG    BNP: No results for input(s): BNP in the last 72 hours. PT/INR:   Recent Labs     07/07/22  0445 07/08/22  0435   PROTIME 22.9* 19.2*   INR 2.1 1.7     APTT:  Recent Labs     07/07/22  0445 07/08/22  0435   APTT 33.5 30.2     CARDIAC ENZYMES:  Recent Labs     07/05/22  1254 07/05/22  1717 07/06/22  0241   TROPHS 57* 53* 49*     FASTING LIPID PANEL:  Lab Results   Component Value Date/Time    CHOL 95 05/12/2022 01:44 AM    HDL 38 05/12/2022 01:44 AM    LDLCALC 38 05/12/2022 01:44 AM    TRIG 93 05/12/2022 01:44 AM     LIVER PROFILE:  Recent Labs     07/07/22  0445 07/08/22  0435   AST 13 14   ALT 8 7   LABALBU 2.8* 3.3*       12 lead EKG 7/6/22: Afib, rate 98. Low QRS complexes. Diffuse non specific ST changes    CXR 7/6/22:   Adequately positioned right internal jugular central venous line.  Otherwise, no significant change compared to prior exam glued in lines and tubes.     CXR 7/7/22: pending    Telemetry: Afib, rate in the 100's    Echo 7/7/22 ( Dr. Kaylen Ling ):  Technically difficult study - limited visualization. Micro-bubble contrast injected to enhance left ventricular visualization. Normal left ventricular size and systolic function. Ejection fraction is visually estimated at 70-75%. Indeterminate diastolic function. No regional wall motion abnormalities seen. Mild left ventricular concentric hypertrophy noted. Moderately dilated right ventricle with reduced function. Biatrial dilation. Mild tricuspid regurgitation. RVSP is at least 60 mmHg. Prominent pericardial fat pad. No definitive evidence of pericardial effusion. ASSESSMENT:   1. AF with RVR, recurrent. Known history of PAF s/p DCCV in 4/2022 and 5/2022. [address] [place] [address] with Eliquis, currently on hold for possible thoracocentesis  2. Hx of HFpEF. Suspect anasarca and pleural effusion contributed to by  SHANTANU on CKD and hypoalbuminemia. 3. Acute hypoxic respiratory failure, intubated on mechanical ventilation. 4. Severe pulmonary HTN on Echo 7/7/22 ( with moderately dilated RV with reduced RV function )  5. Reported acute mental status change on presentation and seizures. Currently sedated and non-responsive. 6. Hypotension, resolved, now off Levophed. Hx of hypertension. 7. SHANTANU on top of CKD   8. Digoxin toxicity on presentation treated with Digibind. 9. Chronic anemia with progressive drop in H&H 8.0&25.5 today  10. Thrombocytopenia with progressive drop in platelet count --> 31 today  11. Hypoalbuminemia  12. Lactic acidosis. 13. Hypokalemia  14. Mild to Moderate CAD on [facility] 1/2022, clinically stable   15. Mild MR and mild TR on TTE 4/2022 (with EF 60%)  16. SERENA, not compliant with CPAP  17. Asthma  18. History of tobacco abuse  19. Morbid obesity 44.92   20. Hx of HLD  21. T2DM, insulin requiring. 22. Hx of Gastric ulcers in 2013  23. Hx of right sided breast CA s/p lumpectomy and radiation therapy. 24. Parkinson's disease. 25. H/o Anxiety/Depression            PLAN:  1. Continue current management per ICU team +- other consultants. 2.   Fluid management per nephrology  3. Eliquis and aspirin on hold  4. Start BB therapy  5. Continue amiodarone as ordered  6.    Cardiology will see PRN, please call if needed.         Electronically signed by Dixie Pollack MD on 7/8/2022 at 12:01 PM

## 2022-07-08 NOTE — PROGRESS NOTES
Mona Kinney is a 66 y.o. right handed woman. We are following for new onset seizures    Significant PMH: Parkinson's disease, SERENA on CPAP, HLD, HTN, CKD, breast cancer s/p lumpectomy, anxiety, diabetes, AF on [address], lumbar laminectomy  Pertinent family history: Son has seizures    Synopsis  She follows with me in the office for Parkinson's disease and is on Sinemet and Requip--with fairly good control of motor symptoms. She presented on 7/5 with an altered mental status and hypotension (84/49), hypoxia, dig toxicity, bradycardia (lowest documented was 58), possible pancreatitis, pneumonia, and SHANTANU. She recently had a change in her psychiatric medications and a right toe amputation on 6/17. Witnessed, non-described sz 7/6    HPI  She remains in ICU intubated--on fentanyl and pressors. She rouses to voice, and becomes significantly anxious and restless. She is following most commands--she had has a fixed upgaze but turns head to command. She displays resting tremors of both legs. Both arms are restrained. EEG was done and pending interpretation. MRI of the brain is also pending--which will hopefully be done today. She also will be getting chest tube. Her daughter is at the bedside and states that she has been off of her dopaminergics since sometime in June. She explains the convoluted hospital and rehab course the patient has been going through since the spring. At times she was hallucinating and very confused. Review of systems is limited due to her intubation and sedation.     Allergies   Allergen Reactions    Ciprofloxacin Nausea And Vomiting    Codeine Itching     Creepy crawly \" feelings     Medications:     Current Facility-Administered Medications   Medication Dose Route Frequency Provider Last Rate Last Admin    potassium chloride 40 mEq in 100 mL IVPB (Central Line)  40 mEq IntraVENous Q4H Quintin Whalen MD        famotidine (PEPCID) 10 mg in sodium chloride (PF) 10 mL injection at 07/07/22 2754    sodium chloride flush 0.9 % injection 5-40 mL  5-40 mL IntraVENous 2 times per day Moe Dhillon MD   10 mL at 07/08/22 0809    sodium chloride flush 0.9 % injection 5-40 mL  5-40 mL IntraVENous PRN Moe Dhillon MD        0.9 % sodium chloride infusion   IntraVENous PRN Moe Dhillon MD        acetaminophen (TYLENOL) tablet 650 mg  650 mg Oral Q6H PRN Moe Dhillon MD        Or    acetaminophen (TYLENOL) suppository 650 mg  650 mg Rectal Q6H PRN Moe Dhillon MD        piperacillin-tazobactam (ZOSYN) 4,500 mg in dextrose 5 % 100 mL IVPB ([id number])  4,500 mg IntraVENous Q12H Moe Dhillon MD 25 mL/hr at 07/08/22 0501 4,500 mg at 07/08/22 0501    glucose chewable tablet 16 g  4 tablet Oral PRN Moe Dhillon MD        dextrose bolus 10% 125 mL  125 mL IntraVENous PRBILLY Dhillon MD        Or    dextrose bolus 10% 250 mL  250 mL IntraVENous PRN Moe Dhillon MD        glucagon (rDNA) injection 1 mg  1 mg IntraMUSCular PRN Moe Dhillon MD        dextrose 5 % solution  100 mL/hr IntraVENous PRN Moe Dhillon MD         Objective:     /65   Pulse (!) 102   Temp 98.6 °F (37 °C) (Bladder)   Resp 16   Ht 5' (1.524 m)   Wt 234 lb (106.1 kg)   SpO2 99%   BMI 45.70 kg/m²     General appearance: Awake, intubated,--anxious on ventilator  Head: Normocephalic, atraumatic--ETT in place  Eyes: Sclera clear  Lungs: Breathing over ventilator  Heart: Tachycardic rate and rhythm--ST  Extremities: Trace edema at ankles; bilateral soft wrist restraints  Skin: Incision site right foot    Mental Status: Arouses to voice; follows a few simple commands. Anxious and restless. Intubated and nonverbal on vent.     Cranial Nerves:  I: smell    II: visual acuity     II: visual fields  bilateral threat   II: pupils  miotic but ERRLA   III,VII: ptosis     III,IV,VI: extraocular muscles   forced vertical upgaze--difficulty looking in any other direction but turns head   V: mastication    V: facial light touch sensation     V,VII: corneal reflex     VII: facial muscle function - upper     VII: facial muscle function - lower  appears symmetric   VIII: hearing  normal   IX: soft palate elevation     IX,X: gag reflex    XI: trapezius strength     XI: sternocleidomastoid strength    XI: neck extension strength      XII: tongue strength   5/5     Motor/sensory:  5/5 both   Wiggles both legs to command  Resting tremors noted of both legs; no seizure activity  Appreciates light touch in all limbs    DTR:  No Kohler's    Laboratory/Radiology:  ry/Radiology:     Lab Results   Component Value Date    WBC 5.8 07/08/2022    HGB 8.0 (L) 07/08/2022    HCT 25.1 (L) 07/08/2022    MCV 83.1 07/08/2022    PLT 32 (L) 07/08/2022    LYMPHOPCT 6.4 (L) 07/08/2022    RBC 3.02 (L) 07/08/2022    MCH 26.5 07/08/2022    MCHC 31.9 (L) 07/08/2022    RDW 15.8 (H) 07/08/2022     Lab Results   Component Value Date     07/08/2022    K 3.3 (L) 07/08/2022    CL 98 07/08/2022    CO2 22 07/08/2022     (H) 07/08/2022    CREATININE 2.6 (H) 07/08/2022    GLUCOSE 159 (H) 07/08/2022    CALCIUM 7.5 (L) 07/08/2022    PROT 5.1 (L) 07/08/2022    LABALBU 3.3 (L) 07/08/2022    BILITOT 0.4 07/08/2022    ALKPHOS 55 07/08/2022    AST 14 07/08/2022    ALT 7 07/08/2022    LABGLOM 18 07/08/2022    GFRAA 22 07/08/2022     CTH head 7/5: no acute abnormalities    MRI brain WO pending    EEG pending    All labs and images personally reviewed today    Assessment:     Seizure-like activity[de-identified] In the setting of respiratory failure, septic shock, and uremia. . There was no description of the seizure-like event in question, but she displays resting tremors of both legs today--- which are from her Parkinson's. There is less suspicion for epileptic events at this time but work-up is pending.   She seems to have a vertical forced upgaze today and MRI is pending to evaluate for ischemic events-- of which she is at risk with her A. fib with RVR.  Otherwise the remainder of her exam appears nonfocal, and somewhat improved today aside from her agitation    Plan:     Follow up EEG results and await MRI brain     Pending clinical course and mental status, may restart Sinemet post extubation    Could use Depakote for agitation if needed    Discussed with patient's daughter at the bedside    We will follow    RASHARD Adrian CNP   7/8/2022

## 2022-07-08 NOTE — INTERVAL H&P NOTE
H&P Update    Patient's History and Physical  was reviewed. The patient appears likely to able to tolerate the procedure. Risk and benefits discussed including ultimate complications, possibly death and consent obtained. Patient and/family had the opportunity to ask questions. All questions were answered and patient/family wishes to proceed.      Anatoly Owens, II

## 2022-07-08 NOTE — PROGRESS NOTES
07/08/22 0856   NICU Vent Information   Vent Type 980   Vent Mode AC/VC   Vt (Set, mL) 350 mL   Vt Exhaled 362 mL   Resp Rate (Set) 14 bmp   Rate Measured 14 br/min   Minute Volume 5.11 Liters   Peak Flow 50 L/min   Pressure Support 0 cmH20   FiO2  40 %   PaO2/FiO2 ratio 232   Peak Inspiratory Pressure 27 cmH2O   I:E Ratio 1:4.60   Sensitivity 3   PEEP/CPAP (cmH2O) 8   Mean Airway Pressure 12 cmH20   Plateau Pressure 22 EYR39   Static Compliance 26 mL/cmH2O   Additional Respiratory Assessments   Heart Rate (!) 110   Resp 14   SpO2 97 %   Position Semi-Bob's   Humidification Source Heated wire   Humidification Temp 37   Cuff Pressure (cm H2O) 29 cm H2O   Vent Alarm Settings   High Pressure  50 cmH2O

## 2022-07-08 NOTE — PROGRESS NOTES
[address] [facility]  Department of Internal Medicine   Internal Medicine Residency   MICU Progress Note    Patient:  Sam Cogan 66 y.o. female  MRN: [id number]     Date of Service: 7/8/2022    Allergy: Ciprofloxacin and Codeine    Subjective     Patient is seen and examined this morning. She is intubated, sedated and ventilated. Opens eyes to voice and pain, able to follow commands, able to move all extremities spontaneously when awake. No focal weakness. Her heart rate does increase to 120s when awake. She is off pressors.  24h change: No acute change overnight  ROS: not able to assess  Objective     VS: /60   Pulse (!) 115   Temp 99.3 °F (37.4 °C)   Resp 16   Ht 5' (1.524 m)   Wt 234 lb (106.1 kg)   SpO2 97%   BMI 45.70 kg/m²   ABP (Arterial line BP): 118/61  ABP Mean (Arterial Line Mean): 78 mmHg    I & O - 24hr:     Intake/Output Summary (Last 24 hours) at 7/8/2022 1400  Last data filed at 7/8/2022 1300  Gross per 24 hour   Intake 596 ml   Output 1590 ml   Net -994 ml       Physical Exam:  · General Appearance: intubated, ventilated, sedated, opens eyes to voice, follows commands, 2-3 mm pupils equally reactive to light  · Neck: no adenopathy, no carotid bruit, no JVD, supple, symmetrical, trachea midline and thyroid not enlarged, symmetric, no tenderness/mass/nodules  · Lung: clear to auscultation bilaterally  · Heart: tachycardic,  irregularly, irregular rhythm,no murmur, click, rub or gallop  · Abdomen: soft, non-tender; bowel sounds normal; no masses,  no organomegaly  · Extremities:  extremities normal, atraumatic, (+) 3-4+ pitting B LES edema  · Musculoskeletal: No joint swelling, no muscle tenderness. ROM normal in all joints of extremities.    · Neurologic: Mental status: intubated, sedated, ventilated, able to follow some commands, opens eyes to pain, moves all extremities spontaneously when awake, no focal weakness    Lines     site day    Art line   R Radial 7/6   TLC R IJ 7/6   PICC None    Hemoaccess None    Oxygen:     N/A  Mechanical Ventilation:   Mode: A/C   low tidal   TV: 350 ml RR: 14  PEEP 8 cmH2O   FiO2 40   Pplat: 22 Cs: 28   ABG:     Lab Results   Component Value Date/Time    PH 7.406 07/08/2022 04:41 AM    PCO2 38.1 07/08/2022 04:41 AM    PO2 92.9 07/08/2022 04:41 AM    HCO3 23.4 07/08/2022 04:41 AM    BE -1.1 07/08/2022 04:41 AM    THB 9.0 07/08/2022 04:41 AM    O2SAT 97.2 07/08/2022 04:41 AM        Medications     Infusions: (Fluid, Sedation, Vasopressors)  IVF:    N/A  Vasopressors     Sedation   Fentanyl at rate of 200     Nutrition:   NG/OG tube TF type: Glucerna        At rate: 40 ml/h    ATB:   Antibiotics  Days   Zosyn 7/5   Vancomycin 7/5         Skin issues: bruise L arm  Patient currently has   Urinary cath  Restraints  DVT prophylaxis/ GI prophylaxis,    PT/OT    Labs     CBC with Differential:    Lab Results   Component Value Date/Time    WBC 6.1 07/08/2022 09:43 AM    RBC 3.08 07/08/2022 09:43 AM    HGB 8.0 07/08/2022 09:43 AM    HCT 25.5 07/08/2022 09:43 AM    PLT 31 07/08/2022 09:43 AM    MCV 82.8 07/08/2022 09:43 AM    MCH 26.0 07/08/2022 09:43 AM    MCHC 31.4 07/08/2022 09:43 AM    RDW 15.8 07/08/2022 09:43 AM    NRBC 0.0 03/15/2019 09:10 AM    SEGSPCT 74 08/20/2013 10:45 AM    LYMPHOPCT 6.6 07/08/2022 09:43 AM    MONOPCT 1.8 07/08/2022 09:43 AM    MYELOPCT 0.9 03/15/2019 09:10 AM    EOSPCT 1 06/16/2017 12:00 AM    BASOPCT 0.0 07/08/2022 09:43 AM    MONOSABS 0.11 07/08/2022 09:43 AM    LYMPHSABS 0.40 07/08/2022 09:43 AM    EOSABS 0.00 07/08/2022 09:43 AM    BASOSABS 0.00 07/08/2022 09:43 AM     CMP:    Lab Results   Component Value Date/Time     07/08/2022 04:35 AM    K 3.3 07/08/2022 04:35 AM    K 5.0 07/06/2022 02:41 AM    CL 98 07/08/2022 04:35 AM    CO2 22 07/08/2022 04:35 AM     07/08/2022 04:35 AM    CREATININE 2.6 07/08/2022 04:35 AM    GFRAA 22 07/08/2022 04:35 AM    LABGLOM 18 07/08/2022 04:35 AM    GLUCOSE 159 07/08/2022 04:35 AM    PROT 5.1 07/08/2022 04:35 AM    LABALBU 3.3 07/08/2022 04:35 AM    CALCIUM 7.5 07/08/2022 04:35 AM    BILITOT 0.4 07/08/2022 04:35 AM    ALKPHOS 55 07/08/2022 04:35 AM    AST 14 07/08/2022 04:35 AM    ALT 7 07/08/2022 04:35 AM     PT/INR:    Lab Results   Component Value Date/Time    PROTIME 19.2 07/08/2022 04:35 AM    INR 1.7 07/08/2022 04:35 AM     PTT:    Lab Results   Component Value Date/Time    APTT 30.2 07/08/2022 04:35 AM   [APTT}  U/A:    Lab Results   Component Value Date/Time    COLORU Yellow 07/05/2022 05:43 PM    PROTEINU 30 07/05/2022 05:43 PM    PHUR 5.5 07/05/2022 05:43 PM    WBCUA 1-3 07/05/2022 05:43 PM    RBCUA NONE 07/05/2022 05:43 PM    RBCUA NONE 03/25/2013 09:00 PM    YEAST RARE 10/27/2015 07:18 PM    BACTERIA FEW 07/05/2022 05:43 PM    CLARITYU Clear 07/05/2022 05:43 PM    SPECGRAV 1.025 07/05/2022 05:43 PM    LEUKOCYTESUR Negative 07/05/2022 05:43 PM    UROBILINOGEN 0.2 07/05/2022 05:43 PM    BILIRUBINUR Negative 07/05/2022 05:43 PM    BLOODU Negative 07/05/2022 05:43 PM    GLUCOSEU Negative 07/05/2022 05:43 PM     ABG:    Lab Results   Component Value Date/Time    PH 7.406 07/08/2022 04:41 AM    PCO2 38.1 07/08/2022 04:41 AM    PO2 92.9 07/08/2022 04:41 AM    HCO3 23.4 07/08/2022 04:41 AM    BE -1.1 07/08/2022 04:41 AM    O2SAT 97.2 07/08/2022 04:41 AM     HgBA1c:    Lab Results   Component Value Date/Time    LABA1C 6.5 05/12/2022 01:44 AM     Urine Toxicology:  No components found for: Briana Blandon, IMARTHC, IOPIATES, IPHENCYC    Imaging Studies:    Chest X-ray 7/8/2022   1. There is no interval change in the bilateral perihilar and lower lobe   airspace disease more prominent within the right lung. 2. Moderate size right pleural effusion. 3. There is no pneumothorax.             Resident's Assessment and Plan   ASSESSMENT:  1. Acute encephalopathy, multifactorial 2/2 septic shock, digoxin toxicity, uremia, improving  2.  Acute hypoxic hypercapnic respiratory failure 2/2 mod R pleural effusion, possible HAP vs acute decompensated  HFpEF exacerbation. -2D echo:7/6/2022: EF 70-75%, indeterminate DD, N LV size and function, moderately to  severely dilated LA,  moderately dilated  RV, mildly enlarged RA, RSVP at least 60  mmHg, no AS, MS, mild TR, dilated IVC  3. Pulmonary hypertension likely Type II 2/2 acute decompensated diastolic heart failure, in the setting of Hx SERENA  4. Moderate R pleural effusion, likely 2/2 HAP vs acute decompensated heart failure  5. HAP (Klebsiella pneumoniae on resp culture, light growth) D3 Zosyn)  5. Shock, likely septic (HAP) less likely cardiogenic (small pericardial effusion on CT)  6. Bradycardia, hypotension 2/2 Digoxin toxicity in the setting of SHANTANU. Digoxin level 3.7>>2  7. Elevated pro-BNP, multifactorial, likely acute decompensated HFpEF, septic shock. 2D echo as above  8. Elevated troponin likely 2/2 demand ischemia  9. Permanent atrial fibrillation, rate controlled. 10. SHANTANU Stage III on CKD multifactorial, pre-renal (hemodynamically mediated, DHN 2/2 diuretic use, poor oral intake); vs ATN 2/2 septic shock  11Thrombocytopenia, etiology? Likely sepsis?, less likely HIT (did not receive any heparin products). D-dimer and fibrinogen wnl, INR 1.7 trending down, no overt bleeding  11. Hyperkalemia 2/2 SHANTANU, resolved. K initially 5.5>>4.4  12. HAGMA 2/2 lactic acidosis (hypoperfusion, septic shock?),  uremia 2/2 SHANTANU, improving  13. Hx of asthma  14. Hx of SERENA. Not using CPAP at home  15. Hx of HTN  16. Hx of CAD  17. Hx of Type 2 DM with neuropathy. On Lantus 10 and Novolog SS at home  18. Hx of depression/anxiety. 19. Hx of restless leg syndrome  20. Hx of Parkinson's disease.  On Sinemet and ropinirole at home, which was discontinued upon discharge at [facility]:  -CXR, ABG daily  -CMP daily, BMP, Mg, Phos q12hrs  -PT, INR, APTT daily  -POCT glucose q4hrs  -follow resp culture sensitivity result  -SBT, SAT daily -continue fentanyl, wean to keep RAAS -1 to +1  -continue Versed 2 mg q2hrs prn x anxiety/agitation  -continue amiodarone PO per Cardio  -start metoprolol 12.5 mg q6hrs per NGT per Cardio  -continue diuresis today  -give albumin 25 g IV and furosemide 40 mg IV once  -for IR-guided insertion of pigtail for R pleural effusion today  -pleural fluid analysis, CXR after procedure  -continue to hold apixaban and aspirin  -continue piperacillin-tazobactam  -wean hydrocortisone to 50 mg IV q8hrs  -avoid NSAIDs and other nephrotoxic agents  -continue Lantus 5 units HS and LDSS LDSS  -discontinue pantoprazole (has <2% risk for thrombocytopenia)  -start famotidine 10 mg IV daily( renally dosed)  -for platelet transfusion 4 products prior to pigtail insertion   -strict I & O  -PCDs for now  -keep HOB 30-45 degrees    PT/OT:   DVT/GI prophylaxis: PCDs/pantoprazole  Disposition: continue current Jacob Lewis MD, PGY-2  Attending physician: Dr. Karey Flores    I personally saw, examined and provided care for the patient. Radiographs, labs and medication list were reviewed by me independently. I spoke with bedside nursing, therapists and consultants. Critical care services and times documented are independent of procedures and multidisciplinary rounds with Residents. Additionally comprehensive, multidisciplinary rounds were conducted with the MICU team. The case was discussed in detail and plans for care were established. Review of Residents documentation was conducted and revisions were made as appropriate.  I agree with the above documented exam, problem list and plan of care with the following additions:    arousable and following commands  Vent weaning after IR pigtail catheter today  Thrombocytopenia workup  Continue amiodarone   Rate control  Empiric Abx  Wean solucortef  Diurese with albumin/lasix    35 minutes of CCT spent with the patient, reviewing the chart including imaging studies, and discussing the case with other health care professionals. This time excludes procedures.      Kapil Hull MD

## 2022-07-08 NOTE — PROGRESS NOTES
Spoke to nurse he is going to get an order for platelets. Once platelets are infused and post plt lab drawn. If Plt count is greater then Dr Pantera Ruiz can safely do procedure.

## 2022-07-08 NOTE — BRIEF OP NOTE
Brief Postoperative Note    Clydia Gillespie  YOB: 1950  [id number]    Pre-operative Diagnosis and Procedure: right effusion plan chest tube    Post-operative Diagnosis: Same    Anesthesia: Local    Estimated Blood Loss: < 10 cc    Surgeon: Dwight JAIME     Complications: none    Specimen obtained: yes    Findings: none     Leoncio Licona II, MD   7/8/2022 3:38 PM

## 2022-07-09 ENCOUNTER — APPOINTMENT (OUTPATIENT)
Dept: GENERAL RADIOLOGY | Age: 72
DRG: 870 | End: 2022-07-09
Payer: MEDICARE

## 2022-07-09 LAB
AADO2: 80.8 MMHG
ALBUMIN SERPL-MCNC: 3 G/DL (ref 3.5–5.2)
ALP BLD-CCNC: 49 U/L (ref 35–104)
ALT SERPL-CCNC: 7 U/L (ref 0–32)
ANION GAP SERPL CALCULATED.3IONS-SCNC: 16 MMOL/L (ref 7–16)
ANION GAP SERPL CALCULATED.3IONS-SCNC: 17 MMOL/L (ref 7–16)
ANISOCYTOSIS: ABNORMAL
ANISOCYTOSIS: ABNORMAL
APTT: 29.2 SEC (ref 24.5–35.1)
AST SERPL-CCNC: 12 U/L (ref 0–31)
ATYPICAL LYMPHOCYTE RELATIVE PERCENT: 0.9 % (ref 0–4)
B.E.: 0.6 MMOL/L (ref -3–3)
BASOPHILS ABSOLUTE: 0 E9/L (ref 0–0.2)
BASOPHILS ABSOLUTE: 0 E9/L (ref 0–0.2)
BASOPHILS RELATIVE PERCENT: 0.1 % (ref 0–2)
BASOPHILS RELATIVE PERCENT: 0.1 % (ref 0–2)
BILIRUB SERPL-MCNC: 0.5 MG/DL (ref 0–1.2)
BUN BLDV-MCNC: 87 MG/DL (ref 6–23)
BUN BLDV-MCNC: 99 MG/DL (ref 6–23)
CALCIUM SERPL-MCNC: 7.5 MG/DL (ref 8.6–10.2)
CALCIUM SERPL-MCNC: 8 MG/DL (ref 8.6–10.2)
CHLORIDE BLD-SCNC: 103 MMOL/L (ref 98–107)
CHLORIDE BLD-SCNC: 104 MMOL/L (ref 98–107)
CO2: 23 MMOL/L (ref 22–29)
CO2: 23 MMOL/L (ref 22–29)
COHB: 0.2 % (ref 0–1.5)
CREAT SERPL-MCNC: 2 MG/DL (ref 0.5–1)
CREAT SERPL-MCNC: 2.3 MG/DL (ref 0.5–1)
CRITICAL: ABNORMAL
DAT POLYSPECIFIC: NORMAL
DATE ANALYZED: ABNORMAL
DATE OF COLLECTION: ABNORMAL
EOSINOPHILS ABSOLUTE: 0 E9/L (ref 0.05–0.5)
EOSINOPHILS ABSOLUTE: 0 E9/L (ref 0.05–0.5)
EOSINOPHILS RELATIVE PERCENT: 0 % (ref 0–6)
EOSINOPHILS RELATIVE PERCENT: 0 % (ref 0–6)
FERRITIN: 243 NG/ML
FIO2: 40 %
GFR AFRICAN AMERICAN: 25
GFR AFRICAN AMERICAN: 30
GFR NON-AFRICAN AMERICAN: 21 ML/MIN/1.73
GFR NON-AFRICAN AMERICAN: 24 ML/MIN/1.73
GLUCOSE BLD-MCNC: 126 MG/DL (ref 74–99)
GLUCOSE BLD-MCNC: 139 MG/DL (ref 74–99)
GRAM STAIN ORDERABLE: NORMAL
HAPTOGLOBIN: 175 MG/DL (ref 30–200)
HCO3: 24.6 MMOL/L (ref 22–26)
HCT VFR BLD CALC: 23.7 % (ref 34–48)
HCT VFR BLD CALC: 23.8 % (ref 34–48)
HCT VFR BLD CALC: 26 % (ref 34–48)
HEMOGLOBIN: 7.4 G/DL (ref 11.5–15.5)
HEMOGLOBIN: 7.6 G/DL (ref 11.5–15.5)
HHB: 1.6 % (ref 0–5)
HYPOCHROMIA: ABNORMAL
HYPOCHROMIA: ABNORMAL
IMMATURE RETIC FRACT: 4.3 % (ref 3–15.9)
INR BLD: 2
IRON SATURATION: 58 % (ref 15–50)
IRON: 88 MCG/DL (ref 37–145)
LAB: ABNORMAL
LACTIC ACID: 1.4 MMOL/L (ref 0.5–2.2)
LACTIC ACID: 1.7 MMOL/L (ref 0.5–2.2)
LYMPHOCYTES ABSOLUTE: 0.91 E9/L (ref 1.5–4)
LYMPHOCYTES ABSOLUTE: 1.18 E9/L (ref 1.5–4)
LYMPHOCYTES RELATIVE PERCENT: 10.4 % (ref 20–42)
LYMPHOCYTES RELATIVE PERCENT: 15.7 % (ref 20–42)
Lab: ABNORMAL
MAGNESIUM: 2 MG/DL (ref 1.6–2.6)
MAGNESIUM: 2 MG/DL (ref 1.6–2.6)
MCH RBC QN AUTO: 26.2 PG (ref 26–35)
MCH RBC QN AUTO: 26.8 PG (ref 26–35)
MCHC RBC AUTO-ENTMCNC: 31.1 % (ref 32–34.5)
MCHC RBC AUTO-ENTMCNC: 32.1 % (ref 32–34.5)
MCV RBC AUTO: 83.5 FL (ref 80–99.9)
MCV RBC AUTO: 84.4 FL (ref 80–99.9)
METER GLUCOSE: 125 MG/DL (ref 74–99)
METER GLUCOSE: 129 MG/DL (ref 74–99)
METER GLUCOSE: 138 MG/DL (ref 74–99)
METER GLUCOSE: 139 MG/DL (ref 74–99)
METER GLUCOSE: 161 MG/DL (ref 74–99)
METER GLUCOSE: 84 MG/DL (ref 74–99)
METHB: 0.5 % (ref 0–1.5)
MODE: AC
MONOCYTES ABSOLUTE: 0.08 E9/L (ref 0.1–0.95)
MONOCYTES ABSOLUTE: 0.37 E9/L (ref 0.1–0.95)
MONOCYTES RELATIVE PERCENT: 0.9 % (ref 2–12)
MONOCYTES RELATIVE PERCENT: 5.2 % (ref 2–12)
MYELOCYTE PERCENT: 0.9 % (ref 0–0)
NEUTROPHILS ABSOLUTE: 5.85 E9/L (ref 1.8–7.3)
NEUTROPHILS ABSOLUTE: 7.3 E9/L (ref 1.8–7.3)
NEUTROPHILS RELATIVE PERCENT: 79.1 % (ref 43–80)
NEUTROPHILS RELATIVE PERCENT: 86.9 % (ref 43–80)
O2 SATURATION: 99.1 % (ref 92–98.5)
O2HB: 97.7 % (ref 94–97)
OPERATOR ID: 7221
OVALOCYTES: ABNORMAL
OVALOCYTES: ABNORMAL
PATIENT TEMP: 37 C
PCO2: 36.5 MMHG (ref 35–45)
PDW BLD-RTO: 15.6 FL (ref 11.5–15)
PDW BLD-RTO: 15.9 FL (ref 11.5–15)
PEEP/CPAP: 8 CMH2O
PFO2: 3.81 MMHG/%
PH BLOOD GAS: 7.45 (ref 7.35–7.45)
PHOSPHORUS: 3.2 MG/DL (ref 2.5–4.5)
PHOSPHORUS: 3.7 MG/DL (ref 2.5–4.5)
PLATELET # BLD: 43 E9/L (ref 130–450)
PLATELET # BLD: 46 E9/L (ref 130–450)
PLATELET CONFIRMATION: NORMAL
PLATELET CONFIRMATION: NORMAL
PMV BLD AUTO: 11.4 FL (ref 7–12)
PMV BLD AUTO: 11.9 FL (ref 7–12)
PO2: 152.4 MMHG (ref 75–100)
POIKILOCYTES: ABNORMAL
POIKILOCYTES: ABNORMAL
POLYCHROMASIA: ABNORMAL
POTASSIUM SERPL-SCNC: 3.6 MMOL/L (ref 3.5–5)
POTASSIUM SERPL-SCNC: 3.6 MMOL/L (ref 3.5–5)
PROTHROMBIN TIME: 22.1 SEC (ref 9.3–12.4)
RBC # BLD: 2.82 E12/L (ref 3.5–5.5)
RBC # BLD: 2.84 E12/L (ref 3.5–5.5)
RETIC HGB EQUIVALENT: 33.4 PG (ref 28.2–36.6)
RETICULOCYTE ABSOLUTE COUNT: 0.01 E12/L
RETICULOCYTE COUNT PCT: 0.2 % (ref 0.4–1.9)
RI(T): 0.53
RR MECHANICAL: 14 B/MIN
SODIUM BLD-SCNC: 143 MMOL/L (ref 132–146)
SODIUM BLD-SCNC: 143 MMOL/L (ref 132–146)
SOURCE, BLOOD GAS: ABNORMAL
THB: 8.1 G/DL (ref 11.5–16.5)
TIME ANALYZED: 543
TOTAL IRON BINDING CAPACITY: 152 MCG/DL (ref 250–450)
TOTAL PROTEIN: 5 G/DL (ref 6.4–8.3)
TRANSFERRIN: 145 MG/DL (ref 200–360)
VT MECHANICAL: 350 ML
WBC # BLD: 7.4 E9/L (ref 4.5–11.5)
WBC # BLD: 8.3 E9/L (ref 4.5–11.5)

## 2022-07-09 PROCEDURE — 2500000003 HC RX 250 WO HCPCS: Performed by: INTERNAL MEDICINE

## 2022-07-09 PROCEDURE — 2580000003 HC RX 258

## 2022-07-09 PROCEDURE — 84100 ASSAY OF PHOSPHORUS: CPT

## 2022-07-09 PROCEDURE — 6370000000 HC RX 637 (ALT 250 FOR IP): Performed by: INTERNAL MEDICINE

## 2022-07-09 PROCEDURE — 6360000002 HC RX W HCPCS

## 2022-07-09 PROCEDURE — 85610 PROTHROMBIN TIME: CPT

## 2022-07-09 PROCEDURE — 80053 COMPREHEN METABOLIC PANEL: CPT

## 2022-07-09 PROCEDURE — 6370000000 HC RX 637 (ALT 250 FOR IP)

## 2022-07-09 PROCEDURE — 83550 IRON BINDING TEST: CPT

## 2022-07-09 PROCEDURE — 36415 COLL VENOUS BLD VENIPUNCTURE: CPT

## 2022-07-09 PROCEDURE — 2580000003 HC RX 258: Performed by: INTERNAL MEDICINE

## 2022-07-09 PROCEDURE — 82805 BLOOD GASES W/O2 SATURATION: CPT

## 2022-07-09 PROCEDURE — 71045 X-RAY EXAM CHEST 1 VIEW: CPT

## 2022-07-09 PROCEDURE — 83540 ASSAY OF IRON: CPT

## 2022-07-09 PROCEDURE — 94003 VENT MGMT INPAT SUBQ DAY: CPT

## 2022-07-09 PROCEDURE — 82962 GLUCOSE BLOOD TEST: CPT

## 2022-07-09 PROCEDURE — 2500000003 HC RX 250 WO HCPCS

## 2022-07-09 PROCEDURE — 36620 INSERTION CATHETER ARTERY: CPT

## 2022-07-09 PROCEDURE — 94640 AIRWAY INHALATION TREATMENT: CPT

## 2022-07-09 PROCEDURE — 84466 ASSAY OF TRANSFERRIN: CPT

## 2022-07-09 PROCEDURE — 83735 ASSAY OF MAGNESIUM: CPT

## 2022-07-09 PROCEDURE — 83010 ASSAY OF HAPTOGLOBIN QUANT: CPT

## 2022-07-09 PROCEDURE — 6360000002 HC RX W HCPCS: Performed by: INTERNAL MEDICINE

## 2022-07-09 PROCEDURE — P9047 ALBUMIN (HUMAN), 25%, 50ML: HCPCS | Performed by: INTERNAL MEDICINE

## 2022-07-09 PROCEDURE — 99233 SBSQ HOSP IP/OBS HIGH 50: CPT | Performed by: INTERNAL MEDICINE

## 2022-07-09 PROCEDURE — 80048 BASIC METABOLIC PNL TOTAL CA: CPT

## 2022-07-09 PROCEDURE — 99233 SBSQ HOSP IP/OBS HIGH 50: CPT | Performed by: NURSE PRACTITIONER

## 2022-07-09 PROCEDURE — 82728 ASSAY OF FERRITIN: CPT

## 2022-07-09 PROCEDURE — 85730 THROMBOPLASTIN TIME PARTIAL: CPT

## 2022-07-09 PROCEDURE — 2000000000 HC ICU R&B

## 2022-07-09 PROCEDURE — 85045 AUTOMATED RETICULOCYTE COUNT: CPT

## 2022-07-09 PROCEDURE — S5553 INSULIN LONG ACTING 5 U: HCPCS | Performed by: INTERNAL MEDICINE

## 2022-07-09 PROCEDURE — 85025 COMPLETE CBC W/AUTO DIFF WBC: CPT

## 2022-07-09 PROCEDURE — 83605 ASSAY OF LACTIC ACID: CPT

## 2022-07-09 RX ORDER — MIDODRINE HYDROCHLORIDE 5 MG/1
2.5 TABLET ORAL
Status: DISCONTINUED | OUTPATIENT
Start: 2022-07-09 | End: 2022-07-10

## 2022-07-09 RX ORDER — DIVALPROEX SODIUM 125 MG/1
125 CAPSULE, COATED PELLETS ORAL EVERY 12 HOURS SCHEDULED
Status: DISCONTINUED | OUTPATIENT
Start: 2022-07-09 | End: 2022-07-09

## 2022-07-09 RX ORDER — FUROSEMIDE 10 MG/ML
40 INJECTION INTRAMUSCULAR; INTRAVENOUS ONCE
Status: COMPLETED | OUTPATIENT
Start: 2022-07-09 | End: 2022-07-09

## 2022-07-09 RX ORDER — MIDAZOLAM HYDROCHLORIDE 2 MG/2ML
0.5 INJECTION, SOLUTION INTRAMUSCULAR; INTRAVENOUS
Status: DISCONTINUED | OUTPATIENT
Start: 2022-07-09 | End: 2022-07-12

## 2022-07-09 RX ORDER — ALBUMIN (HUMAN) 12.5 G/50ML
25 SOLUTION INTRAVENOUS ONCE
Status: COMPLETED | OUTPATIENT
Start: 2022-07-09 | End: 2022-07-09

## 2022-07-09 RX ADMIN — METOPROLOL TARTRATE 25 MG: 25 TABLET, FILM COATED ORAL at 08:21

## 2022-07-09 RX ADMIN — PIPERACILLIN AND TAZOBACTAM 4500 MG: 4; .5 INJECTION, POWDER, FOR SOLUTION INTRAVENOUS at 05:44

## 2022-07-09 RX ADMIN — SODIUM CHLORIDE 0.2 MCG/KG/HR: 9 INJECTION, SOLUTION INTRAVENOUS at 12:15

## 2022-07-09 RX ADMIN — APIXABAN 5 MG: 5 TABLET, FILM COATED ORAL at 20:21

## 2022-07-09 RX ADMIN — MIDAZOLAM 2 MG: 1 INJECTION INTRAMUSCULAR; INTRAVENOUS at 08:30

## 2022-07-09 RX ADMIN — SODIUM CHLORIDE, PRESERVATIVE FREE 10 MG: 5 INJECTION INTRAVENOUS at 08:21

## 2022-07-09 RX ADMIN — CHLORHEXIDINE GLUCONATE 15 ML: 1.2 RINSE ORAL at 20:21

## 2022-07-09 RX ADMIN — FUROSEMIDE 40 MG: 10 INJECTION, SOLUTION INTRAMUSCULAR; INTRAVENOUS at 12:21

## 2022-07-09 RX ADMIN — POTASSIUM BICARBONATE 20 MEQ: 782 TABLET, EFFERVESCENT ORAL at 08:30

## 2022-07-09 RX ADMIN — BUDESONIDE 500 MCG: 0.5 SUSPENSION RESPIRATORY (INHALATION) at 09:50

## 2022-07-09 RX ADMIN — AMIODARONE HYDROCHLORIDE 200 MG: 200 TABLET ORAL at 20:21

## 2022-07-09 RX ADMIN — INSULIN LISPRO 1 UNITS: 100 INJECTION, SOLUTION INTRAVENOUS; SUBCUTANEOUS at 20:22

## 2022-07-09 RX ADMIN — ATORVASTATIN CALCIUM 20 MG: 20 TABLET, FILM COATED ORAL at 08:21

## 2022-07-09 RX ADMIN — SODIUM CHLORIDE, PRESERVATIVE FREE 10 ML: 5 INJECTION INTRAVENOUS at 20:21

## 2022-07-09 RX ADMIN — SODIUM CHLORIDE, PRESERVATIVE FREE 10 ML: 5 INJECTION INTRAVENOUS at 08:32

## 2022-07-09 RX ADMIN — Medication 200 MCG/HR: at 05:45

## 2022-07-09 RX ADMIN — IPRATROPIUM BROMIDE AND ALBUTEROL SULFATE 1 AMPULE: .5; 2.5 SOLUTION RESPIRATORY (INHALATION) at 16:23

## 2022-07-09 RX ADMIN — POLYETHYLENE GLYCOL 3350 17 G: 17 POWDER, FOR SOLUTION ORAL at 08:21

## 2022-07-09 RX ADMIN — MIDODRINE HYDROCHLORIDE 2.5 MG: 5 TABLET ORAL at 12:20

## 2022-07-09 RX ADMIN — IPRATROPIUM BROMIDE AND ALBUTEROL SULFATE 1 AMPULE: .5; 2.5 SOLUTION RESPIRATORY (INHALATION) at 09:51

## 2022-07-09 RX ADMIN — IPRATROPIUM BROMIDE AND ALBUTEROL SULFATE 1 AMPULE: .5; 2.5 SOLUTION RESPIRATORY (INHALATION) at 13:12

## 2022-07-09 RX ADMIN — SODIUM CHLORIDE, PRESERVATIVE FREE 10 ML: 5 INJECTION INTRAVENOUS at 18:19

## 2022-07-09 RX ADMIN — SODIUM CHLORIDE, PRESERVATIVE FREE 10 ML: 5 INJECTION INTRAVENOUS at 08:08

## 2022-07-09 RX ADMIN — INSULIN GLARGINE-YFGN 5 UNITS: 100 INJECTION, SOLUTION SUBCUTANEOUS at 20:23

## 2022-07-09 RX ADMIN — PIPERACILLIN AND TAZOBACTAM 4500 MG: 4; .5 INJECTION, POWDER, FOR SOLUTION INTRAVENOUS at 18:23

## 2022-07-09 RX ADMIN — IPRATROPIUM BROMIDE AND ALBUTEROL SULFATE 1 AMPULE: .5; 2.5 SOLUTION RESPIRATORY (INHALATION) at 20:46

## 2022-07-09 RX ADMIN — CHLORHEXIDINE GLUCONATE 15 ML: 1.2 RINSE ORAL at 08:08

## 2022-07-09 RX ADMIN — BUDESONIDE 500 MCG: 0.5 SUSPENSION RESPIRATORY (INHALATION) at 20:46

## 2022-07-09 RX ADMIN — METOPROLOL TARTRATE 25 MG: 25 TABLET, FILM COATED ORAL at 20:22

## 2022-07-09 RX ADMIN — AMIODARONE HYDROCHLORIDE 200 MG: 200 TABLET ORAL at 08:21

## 2022-07-09 RX ADMIN — ALBUMIN (HUMAN) 25 G: 0.25 INJECTION, SOLUTION INTRAVENOUS at 12:19

## 2022-07-09 RX ADMIN — MIDAZOLAM 2 MG: 1 INJECTION INTRAMUSCULAR; INTRAVENOUS at 05:00

## 2022-07-09 RX ADMIN — MIDODRINE HYDROCHLORIDE 2.5 MG: 5 TABLET ORAL at 18:18

## 2022-07-09 RX ADMIN — ACETAMINOPHEN 650 MG: 325 TABLET ORAL at 18:35

## 2022-07-09 ASSESSMENT — PULMONARY FUNCTION TESTS
PIF_VALUE: 24
PIF_VALUE: 24
PIF_VALUE: 23
PIF_VALUE: 26
PIF_VALUE: 24
PIF_VALUE: 25
PIF_VALUE: 41
PIF_VALUE: 25
PIF_VALUE: 23
PIF_VALUE: 29
PIF_VALUE: 26
PIF_VALUE: 26
PIF_VALUE: 28
PIF_VALUE: 24
PIF_VALUE: 24
PIF_VALUE: 27
PIF_VALUE: 26
PIF_VALUE: 23
PIF_VALUE: 26
PIF_VALUE: 24
PIF_VALUE: 27
PIF_VALUE: 26
PIF_VALUE: 27
PIF_VALUE: 24
PIF_VALUE: 26
PIF_VALUE: 23
PIF_VALUE: 24
PIF_VALUE: 25
PIF_VALUE: 23
PIF_VALUE: 30
PIF_VALUE: 26
PIF_VALUE: 22
PIF_VALUE: 27
PIF_VALUE: 26
PIF_VALUE: 24
PIF_VALUE: 26
PIF_VALUE: 25
PIF_VALUE: 26
PIF_VALUE: 26
PIF_VALUE: 24
PIF_VALUE: 25

## 2022-07-09 ASSESSMENT — PAIN SCALES - GENERAL
PAINLEVEL_OUTOF10: 0

## 2022-07-09 NOTE — PROGRESS NOTES
[address]   Internal Medicine Residency / [address]    Attending Physician Statement  I have discussed the case, including pertinent history and exam findings with the resident and the team.  I have seen and examined the patient and the key elements of the encounter have been performed by me. I agree with the assessment, plan and orders as documented by the resident. Resolving septic shock  Pneumonia with Klebsiella   Now off levophed   S/P pigtail catheter for pleural effusion   Hx of Afib , amiodarone , metoprolol , eliquis   On Zosyn   Extubate today  Remainder of medical problems as per resident note.       Elysia Arreguin MD [facility]  Internal Medicine Residency Faculty

## 2022-07-09 NOTE — PLAN OF CARE
Pt anxious and continues to attempt to pull at critical lines and ETT. Bilateral wrist restraints remain in place for pt safety. Problem: Safety - Medical Restraint  Goal: Remains free of injury from restraints (Restraint for Interference with Medical Device)  Description: INTERVENTIONS:  1. Determine that other, less restrictive measures have been tried or would not be effective before applying the restraint  2. Evaluate the patient's condition at the time of restraint application  3. Inform patient/family regarding the reason for restraint  4.  Q2H: Monitor safety, psychosocial status, comfort, nutrition and hydration  Outcome: Progressing  Flowsheets  Taken 7/9/2022 0600  Remains free of injury from restraints (restraint for interference with medical device): Every 2 hours: Monitor safety, psychosocial status, comfort, nutrition and hydration  Taken 7/9/2022 0400  Remains free of injury from restraints (restraint for interference with medical device): Every 2 hours: Monitor safety, psychosocial status, comfort, nutrition and hydration  Taken 7/9/2022 0200  Remains free of injury from restraints (restraint for interference with medical device): Every 2 hours: Monitor safety, psychosocial status, comfort, nutrition and hydration  Taken 7/9/2022 0000  Remains free of injury from restraints (restraint for interference with medical device): Every 2 hours: Monitor safety, psychosocial status, comfort, nutrition and hydration  Taken 7/8/2022 2200  Remains free of injury from restraints (restraint for interference with medical device): Every 2 hours: Monitor safety, psychosocial status, comfort, nutrition and hydration  Taken 7/8/2022 2000  Remains free of injury from restraints (restraint for interference with medical device): Every 2 hours: Monitor safety, psychosocial status, comfort, nutrition and hydration

## 2022-07-09 NOTE — PROGRESS NOTES
[facility] MD Catracho Nixon MD Roxane Sager MD .  Progress note  Patient's Name: Lenny Dorsey  5:12 PM  7/9/2022 7/7 : seen in her room intubated mechanically ventilated fio2 40 . LE edema 1-2+ . On levophed . UO ok over last 24 hours     7/8 pt not in her room when coming to see her . She is in IR lab. Case d/w RN     7/9 seen in her room d/w ICU resident   Still on some pressors actually BP low when seeing her   Has CT in place with good drainage . Good UO in response to lasix via genao cath . Fio2 40 PEEP 8     History of Present Ilness:      69-year old female with a past medical history of hypertension, A. fib, CAD, asthma, HFpEF, CKD, SERENA, hyperlipidemia, type II DM, anxiety/depression, Parkinson's disease, restless leg syndrome who presented in the ED for altered mental status.     She recently had a right second toe amputation back last month at [facility].  She was discharged after 2 weeks. According to the patient's family, she was doing okay until few hours prior to admission, patient was noted to be acting different yesterday morning. She was noted to be drowsy    Nephrology asked to see for SHANTANU   I did see pt in ICU she is intubated mechanically ventilated . She is on levophed . She has some LE edema. Vent setting stable .    UO marginal .       Past Medical History:   Diagnosis Date    A-fib ([address]     Acute on chronic congestive heart failure (HCC)     Anxiety     Asthma     CAD (coronary artery disease) 01/21/2016    Cancer ([address]  breast ca 2006    right lumpectomy    Chronic kidney disease     nephrolithiasis    Depression     Diabetes mellitus ([address]     H/O mammogram     Hx MRSA infection     toe infection january 2012    Hyperlipidemia     Hypertension     Lateral epicondylitis     SERENA on CPAP     Parkinson's disease ([address]     Tubal ligation status        Past Surgical History:   Procedure Laterality Date    BREAST LUMPECTOMY      BREAST REDUCTION SURGERY      CARDIAC CATHETERIZATION  4/28/2014    Dr. Ashli Moreno  01/11/2022    Dr. Ciarra Burnham  7/29/15    CT GUIDED CHEST TUBE  7/8/2022    CT GUIDED CHEST TUBE 7/8/2022 Almita Eckert MD [facility] CT    ENDOSCOPY, COLON, DIAGNOSTIC  7/19/15    GALLBLADDER SURGERY      LUMBAR LAMINECTOMY      TOE AMPUTATION      TONSILLECTOMY      UPPER GASTROINTESTINAL ENDOSCOPY         Family History   Problem Relation Age of Onset    Cancer Mother         Lung Ca    Cancer Father         lung Ca    Diabetes Sister     Heart Disease Sister     Seizures Son     Other Brother         sepsis        reports that she has never smoked. She has never used smokeless tobacco. She reports that she does not drink alcohol and does not use drugs.     Allergies:  Ciprofloxacin and Codeine    Current Medications:    norepinephrine (LEVOPHED) 16 mg in dextrose 5% 250 mL infusion, Continuous  dexmedetomidine (PRECEDEX) 1,000 mcg in sodium chloride 0.9 % 250 mL infusion, Continuous  midodrine (PROAMATINE) tablet 2.5 mg, TID WC  midazolam PF (VERSED) injection 0.5 mg, Q2H PRN  famotidine (PEPCID) 10 mg in sodium chloride (PF) 10 mL injection, Daily  0.9 % sodium chloride infusion, PRN  budesonide (PULMICORT) nebulizer suspension 500 mcg, BID  levalbuterol (XOPENEX) nebulization 0.63 mg, Q4H PRN  metoprolol tartrate (LOPRESSOR) tablet 25 mg, BID  insulin lispro (HUMALOG) injection vial 0-6 Units, Q4H  insulin glargine-yfgn (SEMGLEE-YFGN) injection vial 5 Units, Nightly  fentaNYL 10 mcg/ml in 0.9%  ml infusion, Continuous  chlorhexidine (PERIDEX) 0.12 % solution 15 mL, BID  ipratropium-albuterol (DUONEB) nebulizer solution 1 ampule, Q4H WA  aspirin chewable tablet 81 mg, Daily  polyethylene glycol (GLYCOLAX) packet 17 g, BID  amiodarone (CORDARONE) tablet 200 mg, BID  [START ON 7/14/2022] amiodarone (CORDARONE) tablet 200 mg, Daily  apixaban (ELIQUIS) tablet 5 mg, BID  atorvastatin (LIPITOR) tablet 20 mg, Daily  sodium chloride flush 0.9 % injection 5-40 mL, 2 times per day  sodium chloride flush 0.9 % injection 5-40 mL, PRN  0.9 % sodium chloride infusion, PRN  acetaminophen (TYLENOL) tablet 650 mg, Q6H PRN   Or  acetaminophen (TYLENOL) suppository 650 mg, Q6H PRN  piperacillin-tazobactam (ZOSYN) 4,500 mg in dextrose 5 % 100 mL IVPB ([id number]), Q12H  glucose chewable tablet 16 g, PRN  dextrose bolus 10% 125 mL, PRN   Or  dextrose bolus 10% 250 mL, PRN  glucagon (rDNA) injection 1 mg, PRN  dextrose 5 % solution, PRN        Review of Systems:   Unable to obtain due to clinical conditon . Physical exam:   Vital signs /65   Pulse 96   Temp (!) 100.9 °F (38.3 °C)   Resp 16   Ht 5' (1.524 m)   Wt 233 lb 3.7 oz (105.8 kg)   SpO2 99%   BMI 45.55 kg/m²   Gen : moderate distress  Head : at , nc   Neck : supple , no thyromegaly noted . Eyes : EOMI , PERRLA   CV : tachycardiac 1+ edema in LE   Lungs: good flow heard b/l   Abd : soft , NT , BS + , No Organomegaly appreciated . Skin : soft, dry . Neuro : CN  II-XII grossly intact , no focal neurologic deficit . Psych : cooperative .      Data:   Labs:  CBC with Differential:    Lab Results   Component Value Date/Time    WBC 7.4 07/09/2022 05:23 AM    RBC 2.84 07/09/2022 05:23 AM    HGB 7.6 07/09/2022 05:23 AM    HCT 26.0 07/09/2022 08:01 AM    PLT 43 07/09/2022 05:23 AM    MCV 83.5 07/09/2022 05:23 AM    MCH 26.8 07/09/2022 05:23 AM    MCHC 32.1 07/09/2022 05:23 AM    RDW 15.6 07/09/2022 05:23 AM    NRBC 0.0 03/15/2019 09:10 AM    SEGSPCT 74 08/20/2013 10:45 AM    LYMPHOPCT 15.7 07/09/2022 05:23 AM    MONOPCT 5.2 07/09/2022 05:23 AM    MYELOPCT 0.9 07/09/2022 12:30 AM    EOSPCT 1 06/16/2017 12:00 AM    BASOPCT 0.1 07/09/2022 05:23 AM    MONOSABS 0.37 07/09/2022 05:23 AM    LYMPHSABS 1.18 07/09/2022 05:23 AM    EOSABS 0.00 07/09/2022 05:23 AM    BASOSABS 0.00 07/09/2022 05:23 AM     CMP: Result   Catheter is in the stomach. XR CHEST PORTABLE   Final Result   Catheter placed with significant right lung aeration improvement and no   pneumothorax         MRI BRAIN WO CONTRAST   Final Result   1. No acute intracranial abnormality. 2. No gross epileptogenic lesion is identified. 3. Bilateral mastoid effusions, which may be due to eustachian tube   dysfunction or otomastoiditis. RECOMMENDATIONS:   Unavailable         CT GUIDED CHEST TUBE   Final Result   Successful uncomplicated  right chest tube placement      Recommendations:   1. Follow-up tube check in 2 weeks   2. Flush tube daily with 5 to 10 mL of sterile saline            XR CHEST PORTABLE   Final Result   1. There is no interval change in the bilateral perihilar and lower lobe   airspace disease more prominent within the right lung. 2. Moderate size right pleural effusion. 3. There is no pneumothorax. US RETROPERITONEAL COMPLETE   Final Result   1. Marked bilateral renal cortical thinning. Echogenic renal parenchyma. No hydronephrosis. 2.  A Diaz catheter appears to be decompressing the bladder. XR CHEST PORTABLE   Final Result   Increasing infiltrate throughout the right lung persistent left basilar   alveolar infiltrate is well. XR CHEST PORTABLE   Final Result   Adequately positioned right internal jugular central venous line. Otherwise,   no significant change compared to prior exam glued in lines and tubes. US DUP LOWER EXTREMITIES BILATERAL VENOUS   Final Result   Within the visualized vessels there is no evidence for deep venous   thrombosis               XR CHEST PORTABLE   Final Result   1. No significant change. Cardiomegaly with right pleural effusion. 2.  Support tubes remain in appropriate position. CT HEAD WO CONTRAST   Final Result   No acute intracranial abnormality or significant change in the overall   appearance of the brain.  MRI would be useful if symptoms persist.      RECOMMENDATIONS:   Unavailable         XR ABDOMEN FOR NG/OG/NE TUBE PLACEMENT   Final Result   Catheter is in the proximal stomach. XR CHEST PORTABLE   Final Result   Adequately positioned endotracheal tube. Nasogastric tube coursing below   diaphragm. Otherwise, stable exam.         CT ABDOMEN PELVIS WO CONTRAST Additional Contrast? None   Final Result   Increasing fluid within the abdomen when compared to the prior study. The   anasarca is also increased in appearance of the prior examination. Mild stranding seen around the mesentery which correlation to clinical lab   values is recommended to exclude possible pancreatitis or volume overload. Overall the appearance of the pancreas itself is grossly unremarkable. There   is only mild stranding seen throughout the mesenteric root. CT CHEST WO CONTRAST   Final Result   Bilateral lung infiltrates with greatest consolidation right lower lobe   probably secondary to atelectasis and or pneumonia. Moderate right pleural effusion. Cardiomegaly and small pericardial effusion. The pericardial effusion is new. Small volume right upper abdominal ascites along with anasarca. This is new. XR CHEST PORTABLE   Final Result   New opacity on the right which may relate to pleural effusion with adjacent   atelectasis and or infiltrate. Cardiomegaly and pulmonary vascular   congestion implying elevated left heart filling pressures. XR CHEST PORTABLE    (Results Pending)       Assessment    Acute kidney injury non oligouric:   Baseline cr 0.9  Etiology of SHANTANU due to decrease effective in setting of intravascular volume depletion as evident by her need for levophed and her urine lytes with high avidity for cl and Na .   Basically bland urine sediment which make GN/vasculitis unlikely   High BUN in part due to steroids   LE edema noted though she is still on levophed        Continue hemodynamic support  Guide Abx per pharmacy dosing    Ok for diuretics as hemodynamically tolerated and clinically needed   She likely benefit from trying to unload her hypervolemia prior to SBT and potential extubation          -Encephalopathy metabolic multifactorial : per ICU team     -Digoxin toxicity s/p digbind    -Shock R/O septic shock               Electronically signed by Gale Reina MD on 7/9/2022 at 5:12 PM

## 2022-07-09 NOTE — PLAN OF CARE
Problem: Chronic Conditions and Co-morbidities  Goal: Patient's chronic conditions and co-morbidity symptoms are monitored and maintained or improved  7/9/2022 0729 by Bob Wells RN  Outcome: Progressing  Flowsheets (Taken 7/9/2022 0700)  Care Plan - Patient's Chronic Conditions and Co-Morbidity Symptoms are Monitored and Maintained or Improved:   Monitor and assess patient's chronic conditions and comorbid symptoms for stability, deterioration, or improvement   Collaborate with multidisciplinary team to address chronic and comorbid conditions and prevent exacerbation or deterioration   Update acute care plan with appropriate goals if chronic or comorbid symptoms are exacerbated and prevent overall improvement and discharge  7/9/2022 0625 by Shay Gonzáles RN  Outcome: Progressing     Problem: Discharge Planning  Goal: Discharge to home or other facility with appropriate resources  7/9/2022 0729 by ELISEO Johns RN  Outcome: Progressing  Flowsheets (Taken 7/9/2022 0700)  Discharge to home or other facility with appropriate resources:   Identify barriers to discharge with patient and caregiver   Arrange for needed discharge resources and transportation as appropriate   Identify discharge learning needs (meds, wound care, etc)  7/9/2022 0625 by Shay Gonzáles RN  Outcome: Progressing     Problem: Pain  Goal: Verbalizes/displays adequate comfort level or baseline comfort level  7/9/2022 0729 by Bob Wells RN  Outcome: Progressing  Flowsheets (Taken 7/9/2022 0700)  Verbalizes/displays adequate comfort level or baseline comfort level:   Encourage patient to monitor pain and request assistance   Assess pain using appropriate pain scale  7/9/2022 0625 by Shay Gonzáles RN  Outcome: Progressing     Problem: Skin/Tissue Integrity  Goal: Absence of new skin breakdown  Description: 1. Monitor for areas of redness and/or skin breakdown  2. Assess vascular access sites hourly  3.   Every 4-6 hours minimum: interference with medical device): Every 2 hours: Monitor safety, psychosocial status, comfort, nutrition and hydration  Taken 7/8/2022 2200  Remains free of injury from restraints (restraint for interference with medical device): Every 2 hours: Monitor safety, psychosocial status, comfort, nutrition and hydration  Taken 7/8/2022 2000  Remains free of injury from restraints (restraint for interference with medical device): Every 2 hours: Monitor safety, psychosocial status, comfort, nutrition and hydration     Problem: Safety - Adult  Goal: Free from fall injury  7/9/2022 0729 by Yue Gorman RN  Outcome: Progressing  Flowsheets (Taken 7/9/2022 3661 by Blayne Evans RN)  Free From Fall Injury:   Instruct family/caregiver on patient safety   Based on caregiver fall risk screen, instruct family/caregiver to ask for assistance with transferring infant if caregiver noted to have fall risk factors  7/9/2022 0625 by Blayne Evans RN  Outcome: Progressing     Problem: Nutrition Deficit:  Goal: Optimize nutritional status  7/9/2022 0729 by Yue Gorman RN  Outcome: Progressing  7/9/2022 0625 by Blayne Evans RN  Outcome: Progressing

## 2022-07-09 NOTE — PROGRESS NOTES
awake, follow simple commands. Moves all extremities      Intake/Output Summary (Last 24 hours) at 7/9/2022 1600  Last data filed at 7/9/2022 1400  Gross per 24 hour   Intake 1778.99 ml   Output 3180 ml   Net -1401.01 ml       Weight:   Wt Readings from Last 3 Encounters:   07/09/22 233 lb 3.7 oz (105.8 kg)   07/06/22 230 lb (104.3 kg)   05/17/22 227 lb 1.6 oz (103 kg)     Current Inpatient Medications:   midodrine  2.5 mg Orogastric TID WC    famotidine (PEPCID) injection  10 mg IntraVENous Daily    budesonide  0.5 mg Nebulization BID    metoprolol tartrate  25 mg Per NG tube BID    insulin lispro  0-6 Units SubCUTAneous Q4H    insulin glargine  5 Units SubCUTAneous Nightly    chlorhexidine  15 mL Mouth/Throat BID    ipratropium-albuterol  1 ampule Inhalation Q4H WA    aspirin  81 mg Oral Daily    polyethylene glycol  17 g Oral BID    amiodarone  200 mg Oral BID    [START ON 7/14/2022] amiodarone  200 mg Oral Daily    apixaban  5 mg Oral BID    atorvastatin  20 mg Oral Daily    sodium chloride flush  5-40 mL IntraVENous 2 times per day    piperacillin-tazobactam  4,500 mg IntraVENous Q12H       IV Infusions (if any):   norepinephrine 2 mcg/min (07/09/22 1220)    dexmedetomidine (PRECEDEX) IV infusion 0.4 mcg/kg/hr (07/09/22 1420)    sodium chloride      fentaNYL Stopped (07/09/22 1224)    sodium chloride      dextrose         DIAGNOSTIC/ LABORATORY DATA:  Labs:   CBC:   Recent Labs     07/09/22  0030 07/09/22  0030 07/09/22  0523 07/09/22  0801   WBC 8.3  --  7.4  --    HGB 7.4*  --  7.6*  --    HCT 23.8*   < > 23.7* 26.0*   PLT 46*  --  43*  --     < > = values in this interval not displayed.      BMP:   Recent Labs     07/08/22  1841 07/09/22  0523    143   K 3.7 3.6   CO2 23 23   BUN 96* 99*   CREATININE 2.5* 2.3*   LABGLOM 19 21   CALCIUM 7.5* 7.5*     Mag:   Recent Labs     07/08/22 1841 07/09/22  0523   MG 2.2 2.0     Phos:   Recent Labs     07/08/22 1841 07/09/22  0523   PHOS 5. 0* 3.7     TFT:   Lab Results   Component Value Date    TSH 4.580 (H) 07/06/2022    X2USCQJ 8.7 07/11/2016    T4FREE 0.97 07/06/2022      HgA1c:   Lab Results   Component Value Date    LABA1C 6.5 (H) 05/12/2022     PT/INR:   Recent Labs     07/08/22  0435 07/09/22  0523   PROTIME 19.2* 22.1*   INR 1.7 2.0     APTT:  Recent Labs     07/08/22  0435 07/09/22  0523   APTT 30.2 29.2     FASTING LIPID PANEL:  Lab Results   Component Value Date/Time    CHOL 95 05/12/2022 01:44 AM    HDL 38 05/12/2022 01:44 AM    LDLCALC 38 05/12/2022 01:44 AM    TRIG 93 05/12/2022 01:44 AM     LIVER PROFILE:  Recent Labs     07/08/22 0435 07/09/22  0523   AST 14 12   ALT 7 7   LABALBU 3.3* 3.0*       12 lead EKG 7/6/22: Afib, rate 98. Low QRS complexes. Diffuse non specific ST changes    CXR 7/6/22:   Adequately positioned right internal jugular central venous line.  Otherwise, no significant change compared to prior exam glued in lines and tubes.         Telemetry: Afib with RVR at times with agitation    Echo 7/7/22 ( Dr. Goran Jennings ):  Technically difficult study - limited visualization. Micro-bubble contrast injected to enhance left ventricular visualization. Normal left ventricular size and systolic function. Ejection fraction is visually estimated at 70-75%. Indeterminate diastolic function. No regional wall motion abnormalities seen. Mild left ventricular concentric hypertrophy noted. Moderately dilated right ventricle with reduced function. Biatrial dilation. Mild tricuspid regurgitation. RVSP is at least 60 mmHg. Prominent pericardial fat pad. No definitive evidence of pericardial effusion. ASSESSMENT:   1. AF with RVR, recurrent. Known history of PAF s/p DCCV in 4/2022 and 5/2022. [address] with Eliquis, currently on hold for possible thoracocentesis  2. Hx of HFpEF. Suspect anasarca and pleural effusion contributed to by  SHANTANU on CKD and hypoalbuminemia.    3. Acute hypoxic respiratory failure, intubated on mechanical ventilation. 4.   Right sided pleural effusion s/p chest tube insertion  5. Severe pulmonary HTN on Echo 7/7/22 ( with moderately dilated RV with reduced RV function )  5. Reported acute mental status change on presentation and seizures. Currently sedated and non-responsive. 6.   Hypotension, resolved. Hx of hypertension. 7.   SHANTANU on top of CKD   8. Chronic anemia with progressive drop in H&H to 7.1/22.4 --> received transfusion 7/8/22, Hct 26.0 today  9. Thrombocytopenia with progressive drop in platelet count --> 43 today  10. Hypoalbuminemia  11. Lactic acidosis. 12.  Hypokalemia, supplemented and resolved  13. Mild to Moderate CAD on [address] [place] [address] 1/2022, clinically stable   14. SERENA, not compliant with CPAP  15. Asthma  16. History of tobacco abuse  17. Morbid obesity 44.92   18. Hx of HLD  19. T2DM, insulin requiring. 20. Hx of Gastric ulcers in 2013  21. Hx of right sided breast CA s/p lumpectomy and radiation therapy. 22.   Parkinson's disease. 23.   H/o Anxiety/Depression            PLAN:  1. Continue current management per ICU team +- other consultants. 2.   Fluid management per nephrology  3. Eliquis and aspirin on hold  4. Continue Lopressor and Amiodarone  5. Wean off vent as tolerated  6.    Cardiology will see PRN, please call if needed.         Electronically signed by Alejandra Vicente MD on 7/9/2022 at 4:00 PM

## 2022-07-09 NOTE — PROGRESS NOTES
Praneeth Darden is a 66 y.o. right handed woman. Neurology is following for new onset seizures    PMH: Parkinson's disease, SERENA on CPAP, HLD, HTN, CKD, breast cancer s/p lumpectomy, anxiety, diabetes, AF on [address], lumbar laminectomy   Pertinent family history: Son has seizures    She presented on 7/5 with an altered mental status and hypotension (84/49), hypoxia, dig toxicity, bradycardia (lowest documented was 58), possible pancreatitis, pneumonia, and SHANTANU. She recently had a change in her psychiatric medications and a right toe amputation on 6/17. Witnessed, non-described sz 7/6. She follows with Marcial Fontaine in the office for Parkinson's disease and is on Sinemet and Requip--with fairly good control of motor symptoms. Currently she remains in ICU intubated--on fentanyl; Levophed discontinued around 840 this morning    On Friday, she reportedly rouses to voice but becomes significantly anxious and restless. She was following most commands--she had has a fixed upgaze but turns head to command. She displays resting tremors of both legs. Both arms are restrained. However today due to agitation patient was given Versed around 5 AM and again at 830. Exam this morning is skewed--no commands or withdraw to pain for me. EEG was done and pending interpretation. MRI of the brain is also pending--which will hopefully be done today. She also will be getting chest tube. There is no family present at the bedside. Patient's attending was at bedside this morning during my visit. Plan of care discussed. There has been no seizure activity. There is no family present at bedside currently    Low-grade temps and intermittently tachycardic otherwise vitals are stable on vent. Review of systems is limited due to her intubation and sedation.     Allergies   Allergen Reactions    Ciprofloxacin Nausea And Vomiting    Codeine Itching     Creepy crawly \" feelings     Medications:     Current Facility-Administered Medications   Medication Dose Route Frequency Provider Last Rate Last Admin    norepinephrine (LEVOPHED) 16 mg in dextrose 5% 250 mL infusion  1-100 mcg/min IntraVENous Continuous Dulce Melendrez MD 3.8 mL/hr at 07/09/22 0855 4 mcg/min at 07/09/22 0855    famotidine (PEPCID) 10 mg in sodium chloride (PF) 10 mL injection  10 mg IntraVENous Daily Dulce Melendrez MD   10 mg at 07/09/22 4219    0.9 % sodium chloride infusion   IntraVENous PRN Dulce Melendrez MD        budesonide (PULMICORT) nebulizer suspension 500 mcg  0.5 mg Nebulization BID Dulce Melendrez MD   500 mcg at 07/08/22 1955    levalbuterol (XOPENEX) nebulization 0.63 mg  0.63 mg Nebulization Q4H PRN Dulce Melendrez MD        metoprolol tartrate (LOPRESSOR) tablet 25 mg  25 mg Per NG tube BID Bassam Escobar MD   25 mg at 07/09/22 5093    insulin lispro (HUMALOG) injection vial 0-6 Units  0-6 Units SubCUTAneous Q4H Dulce Melendrez MD   1 Units at 07/08/22 2100    insulin glargine-yfgn (SEMGLEE-YFGN) injection vial 5 Units  5 Units SubCUTAneous Nightly Dulce Melendrez MD   5 Units at 07/08/22 2021    midazolam PF (VERSED) injection 2 mg  2 mg IntraVENous Q2H PRN Dulce Melendrez MD   2 mg at 07/09/22 0830    fentaNYL 10 mcg/ml in 0.9%  ml infusion   mcg/hr IntraVENous Continuous Khadijah Juarez MD 20 mL/hr at 07/09/22 0545 200 mcg/hr at 07/09/22 0545    chlorhexidine (PERIDEX) 0.12 % solution 15 mL  15 mL Mouth/Throat BID Kristi Blum MD   15 mL at 07/09/22 0808    ipratropium-albuterol (DUONEB) nebulizer solution 1 ampule  1 ampule Inhalation Q4H WA Khadijah Juarez MD   1 ampule at 07/08/22 1955    [Held by provider] aspirin chewable tablet 81 mg  81 mg Oral Daily Bassam Escobar MD   81 mg at 07/07/22 0563    polyethylene glycol (GLYCOLAX) packet 17 g  17 g Oral BID Bassam Escobar MD   17 g at 07/09/22 6762    amiodarone (CORDARONE) tablet 200 mg  200 mg Oral BID Malka Crocker MD   200 mg at 07/09/22 0821    [START ON 7/14/2022] amiodarone (CORDARONE) tablet 200 mg  200 mg Oral Daily Malka Crocker MD        [Held by provider] apixaban Kourtney Selby) tablet 5 mg  5 mg Oral BID Trell Mccoy MD   5 mg at 07/06/22 0802    atorvastatin (LIPITOR) tablet 20 mg  20 mg Oral Daily Alex Wang MD   20 mg at 07/09/22 5373    sodium chloride flush 0.9 % injection 5-40 mL  5-40 mL IntraVENous 2 times per day Alex Wang MD   10 mL at 07/09/22 8163    sodium chloride flush 0.9 % injection 5-40 mL  5-40 mL IntraVENous PRN Alex Wang MD   10 mL at 07/09/22 7315    0.9 % sodium chloride infusion   IntraVENous PRN Alex Wang MD        acetaminophen (TYLENOL) tablet 650 mg  650 mg Oral Q6H PRN Alex Wang MD        Or    acetaminophen (TYLENOL) suppository 650 mg  650 mg Rectal Q6H PRN Alex Wang MD        piperacillin-tazobactam (ZOSYN) 4,500 mg in dextrose 5 % 100 mL IVPB ([id number])  4,500 mg IntraVENous Q12H Alex Wang MD 25 mL/hr at 07/09/22 0544 4,500 mg at 07/09/22 0544    glucose chewable tablet 16 g  4 tablet Oral PRN Alex Wang MD        dextrose bolus 10% 125 mL  125 mL IntraVENous PRN Alex Wang MD        Or    dextrose bolus 10% 250 mL  250 mL IntraVENous PRN Alex Wang MD        glucagon (rDNA) injection 1 mg  1 mg IntraMUSCular PRN Alex Wang MD        dextrose 5 % solution  100 mL/hr IntraVENous PRN Alex Wang MD         Objective:     /65   Pulse 95   Temp 99.9 °F (37.7 °C) (Bladder)   Resp (!) 9   Ht 5' (1.524 m)   Wt 233 lb 3.7 oz (105.8 kg)   SpO2 100%   BMI 45.55 kg/m²     General appearance: Sedated, intubated, no obvious distress  Head: Normocephalic, atraumatic--ETT in place  Eyes: Sclera clear  Lungs: Breathing over ventilator  Heart: Tachycardic rate and rhythm-- NSR -ST  Extremities: Trace edema at ankles; bilateral soft wrist restraints  Skin: Incision site right foot    Mental Status: Sedated, no response to voice or noxious stimuli; does not follow commands remains intubated.     Cranial Nerves:  I: smell    II: visual acuity     II: visual fields  bilateral threat   II: pupils  miotic but ERRLA   III,VII: ptosis     III,IV,VI: extraocular muscles   pupils are midline today   V: mastication    V: facial light touch sensation   no response to LT   V,VII: corneal reflex     VII: facial muscle function - upper     VII: facial muscle function - lower  appears symmetric   VIII: hearing  no response to voice   IX: soft palate elevation     IX,X: gag reflex    XI: trapezius strength     XI: sternocleidomastoid strength    XI: neck extension strength      XII: tongue strength   5/5     Motor/sensory:  Bilateral wrist restraints are in place  No response to noxious stimuli in any extremity for me  Normal bulk and tone  No abnormal movements; no tremors noted today    On Friday:  5/5 both   Wiggles both legs to command  Resting tremors noted of both legs; no seizure activity  Appreciates light touch in all limbs    DTR:  No reflexes  No Babinski's  No Kohler's    Laboratory/Radiology:  ry/Radiology:     Lab Results   Component Value Date    WBC 7.4 07/09/2022    HGB 7.6 (L) 07/09/2022    HCT 26.0 (L) 07/09/2022    MCV 83.5 07/09/2022    PLT 43 (L) 07/09/2022    LYMPHOPCT 17.3 (L) 07/09/2022    RBC 2.84 (L) 07/09/2022    MCH 26.8 07/09/2022    MCHC 32.1 07/09/2022    RDW 15.6 (H) 07/09/2022     Lab Results   Component Value Date     07/09/2022    K 3.6 07/09/2022     07/09/2022    CO2 23 07/09/2022    BUN 99 (H) 07/09/2022    CREATININE 2.3 (H) 07/09/2022    GLUCOSE 126 (H) 07/09/2022    CALCIUM 7.5 (L) 07/09/2022    PROT 5.0 (L) 07/09/2022    LABALBU 3.0 (L) 07/09/2022    BILITOT 0.5 07/09/2022    ALKPHOS 49 07/09/2022    AST 12 07/09/2022    ALT 7 07/09/2022 LABGLOM 21 07/09/2022    GFRAA 25 07/09/2022     CTH head 7/5: no acute abnormalities    MRI brain w/o 7/8:   No acute intracranial abnormality. No gross epileptogenic lesion is identified     EEG pending    All labs and images personally reviewed today    Assessment:     Seizure-like activity: In the setting of respiratory failure, septic shock, and uremia. . There was no description of the seizure-like event in question, but she displayed resting tremors of both legs earlier in the week which is consistent with her Parkinson's. MRI brain completed showed no acute intracranial pathology. EEG report is pending   There is less suspicion for epileptic events at this time    Acute encephalopathy   Multifactorial due to shock, digoxin toxicity, uremia, hyperammonemia, recent change in medications other metabolic derangements    A. fib with RVR   Eliquis currently on hold due to recent thoracentesis; beta-blocker and amiodarone per cardiology    Her exam is skewed due to recent sedation due to agitation. There has been no seizure activity off AEDs.     Plan:     Continue supportive care  Wean sedation and vent as able  Continue to treat other medical conditions  Await EEG results  Continue to monitor off AEDs  May restart Sinemet post extubation pending on mental status and clinical course  Continue aspirin and statin as well as Eliquis once able  Could use Depakote for agitation if needed  Seizure precautions    RASHARD Frank NP-RICHARD  7/9/2022

## 2022-07-09 NOTE — PROGRESS NOTES
[facility]  Progress Note - House Team     Patient:  Verito Rodriges 66 y.o. female MRN: [id number]     Date of Service: 7/9/2022     CC: altered mental status  Overnight events: s/p IR pigtail catheter placemet    Subjective     Patient was seen and examined this morning at bedside. Remains intubated, sedated on fentanyl, off pressor. She was awake, alert, and agitated causing tachycardia. She follows commands, moves extremities purposefully. Overnight, chest tube was place, had 950cc output of clear, light yellow pleural fluid. CXR after procedure showed complete aeration of the R lung. Patient was planned for extubation today, however she went apneic during SBT. Objective     Physical Exam:  Vitals: /65   Pulse (!) 103   Temp 99.5 °F (37.5 °C)   Resp 26   Ht 5' (1.524 m)   Wt 233 lb 3.7 oz (105.8 kg)   SpO2 (!) 82%   BMI 45.55 kg/m²     I & O - 24hr: I/O this shift:  In: 110 [I.V.:20; NG/GT:90]  Out: 450 [Urine:450]   General Appearance: intubated, sedated  HEENT:  Head: Normal, normocephalic, atraumatic. Neck: no adenopathy, no carotid bruit, no JVD, supple, symmetrical, trachea midline and thyroid not enlarged, symmetric, no tenderness/mass/nodules  Lung: clear to auscultation bilaterally  Heart: irregular rhythm, tachycardia, S1, S2 normal, no murmur, click, rub or gallop  Abdomen: soft, non-tender; bowel sounds normal; no masses,  no organomegaly  Extremities:  edema +2 on BLE  Musculokeletal: No joint swelling, no muscle tenderness. ROM normal in all joints of extremities.    Neurologic: Mental status: intubated, sedated, awake, follows commands, agitated  Subject  Pertinent Labs & Imaging Studies   genesis  CBC with Differential:    Lab Results   Component Value Date/Time    WBC 7.4 07/09/2022 05:23 AM    RBC 2.84 07/09/2022 05:23 AM    HGB 7.6 07/09/2022 05:23 AM    HCT 26.0 07/09/2022 08:01 AM    PLT 43 07/09/2022 05:23 AM    MCV 83.5 07/09/2022 05:23 AM    MCH 26.8 07/09/2022 05:23 AM    MCHC 32.1 07/09/2022 05:23 AM    RDW 15.6 07/09/2022 05:23 AM    NRBC 0.0 03/15/2019 09:10 AM    SEGSPCT 74 08/20/2013 10:45 AM    LYMPHOPCT 15.7 07/09/2022 05:23 AM    MONOPCT 5.2 07/09/2022 05:23 AM    MYELOPCT 0.9 07/09/2022 12:30 AM    EOSPCT 1 06/16/2017 12:00 AM    BASOPCT 0.1 07/09/2022 05:23 AM    MONOSABS 0.37 07/09/2022 05:23 AM    LYMPHSABS 1.18 07/09/2022 05:23 AM    EOSABS 0.00 07/09/2022 05:23 AM    BASOSABS 0.00 07/09/2022 05:23 AM     CMP:    Lab Results   Component Value Date/Time     07/09/2022 05:23 AM    K 3.6 07/09/2022 05:23 AM    K 5.0 07/06/2022 02:41 AM     07/09/2022 05:23 AM    CO2 23 07/09/2022 05:23 AM    BUN 99 07/09/2022 05:23 AM    CREATININE 2.3 07/09/2022 05:23 AM    GFRAA 25 07/09/2022 05:23 AM    LABGLOM 21 07/09/2022 05:23 AM    GLUCOSE 126 07/09/2022 05:23 AM    PROT 5.0 07/09/2022 05:23 AM    LABALBU 3.0 07/09/2022 05:23 AM    CALCIUM 7.5 07/09/2022 05:23 AM    BILITOT 0.5 07/09/2022 05:23 AM    ALKPHOS 49 07/09/2022 05:23 AM    AST 12 07/09/2022 05:23 AM    ALT 7 07/09/2022 05:23 AM     Magnesium:    Lab Results   Component Value Date/Time    MG 2.0 07/09/2022 05:23 AM     Phosphorus:    Lab Results   Component Value Date/Time    PHOS 3.7 07/09/2022 05:23 AM       XR CHEST PORTABLE   Final Result   The evaluation is limited due to low lung volumes. No interval change compared to prior exam.      Lines and tubes are in unchanged position. XR ABDOMEN FOR NG/OG/NE TUBE PLACEMENT   Final Result   Catheter is in the stomach. XR CHEST PORTABLE   Final Result   Catheter placed with significant right lung aeration improvement and no   pneumothorax         MRI BRAIN WO CONTRAST   Final Result   1. No acute intracranial abnormality. 2. No gross epileptogenic lesion is identified. 3. Bilateral mastoid effusions, which may be due to eustachian tube   dysfunction or otomastoiditis. RECOMMENDATIONS:   Unavailable         CT GUIDED CHEST TUBE   Final Result   Successful uncomplicated  right chest tube placement      Recommendations:   1. Follow-up tube check in 2 weeks   2. Flush tube daily with 5 to 10 mL of sterile saline            XR CHEST PORTABLE   Final Result   1. There is no interval change in the bilateral perihilar and lower lobe   airspace disease more prominent within the right lung. 2. Moderate size right pleural effusion. 3. There is no pneumothorax. US RETROPERITONEAL COMPLETE   Final Result   1. Marked bilateral renal cortical thinning. Echogenic renal parenchyma. No hydronephrosis. 2.  A Diaz catheter appears to be decompressing the bladder. XR CHEST PORTABLE   Final Result   Increasing infiltrate throughout the right lung persistent left basilar   alveolar infiltrate is well. XR CHEST PORTABLE   Final Result   Adequately positioned right internal jugular central venous line. Otherwise,   no significant change compared to prior exam glued in lines and tubes. US DUP LOWER EXTREMITIES BILATERAL VENOUS   Final Result   Within the visualized vessels there is no evidence for deep venous   thrombosis               XR CHEST PORTABLE   Final Result   1. No significant change. Cardiomegaly with right pleural effusion. 2.  Support tubes remain in appropriate position. CT HEAD WO CONTRAST   Final Result   No acute intracranial abnormality or significant change in the overall   appearance of the brain. MRI would be useful if symptoms persist.      RECOMMENDATIONS:   Unavailable         XR ABDOMEN FOR NG/OG/NE TUBE PLACEMENT   Final Result   Catheter is in the proximal stomach. XR CHEST PORTABLE   Final Result   Adequately positioned endotracheal tube. Nasogastric tube coursing below   diaphragm.   Otherwise, stable exam.         CT ABDOMEN PELVIS WO CONTRAST Additional Contrast? None   Final Result   Increasing fluid within the abdomen when compared to the prior study. The   anasarca is also increased in appearance of the prior examination. Mild stranding seen around the mesentery which correlation to clinical lab   values is recommended to exclude possible pancreatitis or volume overload. Overall the appearance of the pancreas itself is grossly unremarkable. There   is only mild stranding seen throughout the mesenteric root. CT CHEST WO CONTRAST   Final Result   Bilateral lung infiltrates with greatest consolidation right lower lobe   probably secondary to atelectasis and or pneumonia. Moderate right pleural effusion. Cardiomegaly and small pericardial effusion. The pericardial effusion is new. Small volume right upper abdominal ascites along with anasarca. This is new. XR CHEST PORTABLE   Final Result   New opacity on the right which may relate to pleural effusion with adjacent   atelectasis and or infiltrate. Cardiomegaly and pulmonary vascular   congestion implying elevated left heart filling pressures.          XR CHEST PORTABLE    (Results Pending)        Resident's Assessment and Plan     Assessment and Plan:    Acute encephalopathy, multifactorial 2/2 shock, digoxin toxicity, uremia, hyperammonemia, recent change of medications; r/o other causes   Came in for AMS with hypotension not improved with fluids  Dig level 3.7  Ammonia 69  BUN 95 on presentation  CT head negative for any acute abnormality  Seizure-like activity  MRI brain negative for intracranial abnormality  EEG pending  Shock, likely septic (unknown etiology, likely HAP) vs cardiogenic (pericardial effusion on CT)  Concerns for septic shock; although no WBC or fever but pt presented with hypotension, AMS; Procal 0.22, unclear source of infection likely pneumonia; respi cx growing Klebsiella  Concerns for cardiogenic; small pericardial effusion seen on imaging, possible acute exacerbation of HF  Echo showed EF 70-75%, indeterminate DD, normal LV size and function with no pericardial effusion noted  Elevated troponin likely 2/2 demand ischemia -troponin 57 -> 53; EKG showed no ischemic changes  Atrial fibrillation, on eliquis 5mg BID and metoprolol 50mg BID at home  Bradycardia likely 2/2 digoxin toxicity in the setting of SHANTANU - resolved  Pulmonary HTN likely Type II 2/2 acute decompensated heart failure  Acute hypoxic hypercapnic respiratory failure 2/2 pleural effusion, possible HAP vs pericardial effusion vs acute HFpEF exacerbation  Pleural effusion as well as small pericardial effusion seen on imaging  proBNP 41,368  PE ruled out, US BLE negative for DVT  S/p IR pigtail catheter placement, 950cc fluid taken out; fluid analysis c/w transudative with fluid/serum LDH 0.59 and PF/serum protein 0.41  Acute pancreatitis; lipase 148; CT A/P showed mild stranding around the mesentery concerning for pancreatitis versus volume overload, although overall appearance of pancreas itself is grossly unremarkable.   SHANTANU Stage III on CKD - improving - baseline Crea 0.8-0.9, sCr 3.3 on presentation -> 2.3 today; FEUrea: 6.5% -> prerenal likely multifactorial, prerenal (cardiorenal syndrome, dehydration) vs ATN in the setting of septic shock  Hyperkalemia - resolved; K 5.5 -> 3.3  HAGMA 2/2 lactic acidosis, uremia AG 15 -> 23; lactic acid and uremia elevated  Elevated INR  Hx of Type 2 DM with neuropathy, lantus 10 and sliding scale at home  Hx of asthma, on montelukast 10mg daily  Hx of SERENA  Hx of HTN   Hx of CAD  Hx of HFpEF (EF  60% on echo in 4/2022); on furosemide 20mg daily and metoprolol 50mg BID  Hx of hypothyroidism   Hx of depression/anxiety, on seroquel 25mg in AM and 50mg in PM; divalproex 250mg BID  Hx of restless leg syndrome, on ropinirole 1mg TID at home  Hx of Parkinson's disease, was previously on carbi-dopa; discontinued upon discharge at [address]:  Care per ICU team  Weaning sedation, monitor mentation  Follow EEG results  Depakote for agitation if needed and restart Sinemet post extubation per neurology  Monitor BP off pressor  Weaning hydrocortisone  Cardio consulted, appreciate recommendations  Continue amiodarone and lopressor 25 mg BID for A fib RVR  Eliquis and aspirin resumed  Follow thoracentesis results  Continue chest tube  Follow PBS, D-dimer, fibrinogen  Continue Zosyn day 4  Continue on lantus 5u and LDSS  Monitor BG q4h  Hypoglycemia protocol in place  Appreciate input from specialties    PT/OT evaluation: not indicated at this time  DVT prophylaxis/ GI prophylaxis: eliquis/famotidine  Disposition: continue current care    Leanne Dick MD, PGY-2  Attending physician: Dr. Vini Wooten

## 2022-07-09 NOTE — PROGRESS NOTES
No joint swelling, no muscle tenderness. ROM normal in all joints of extremities.    · Neurologic: Mental status: intubated, sedated, ventilated, able to follow some commands, opens eyes spontaneously, moves all extremities spontaneously when awake, no focal weakness    Lines     site day    Art line   R Radial 7/6   TLC R IJ 7/6   PICC None    Hemoaccess None    Oxygen:     N/A  Mechanical Ventilation:   Mode: A/C   low tidal   TV: 350 ml RR: 14  PEEP 8 cmH2O   FiO2 40   Pplat: 22 Cs: 27   ABG:     Lab Results   Component Value Date/Time    PH 7.446 07/09/2022 05:43 AM    PCO2 36.5 07/09/2022 05:43 AM    PO2 152.4 07/09/2022 05:43 AM    HCO3 24.6 07/09/2022 05:43 AM    BE 0.6 07/09/2022 05:43 AM    THB 8.1 07/09/2022 05:43 AM    O2SAT 99.1 07/09/2022 05:43 AM        Medications     Infusions: (Fluid, Sedation, Vasopressors)  IVF:    N/A  Vasopressors   Levophed at 4 mg  Sedation   Fentanyl at rate of 200     Nutrition:   NG/OG tube TF type: Glucerna        At rate: 40 ml/h    ATB:   Antibiotics  Days   Zosyn 7/5             Skin issues: bruise L arm  Patient currently has   Urinary cath  Restraints  DVT prophylaxis/ GI prophylaxis,    PT/OT    Labs     CBC with Differential:    Lab Results   Component Value Date/Time    WBC 7.4 07/09/2022 05:23 AM    RBC 2.84 07/09/2022 05:23 AM    HGB 7.6 07/09/2022 05:23 AM    HCT 26.0 07/09/2022 08:01 AM    PLT 43 07/09/2022 05:23 AM    MCV 83.5 07/09/2022 05:23 AM    MCH 26.8 07/09/2022 05:23 AM    MCHC 32.1 07/09/2022 05:23 AM    RDW 15.6 07/09/2022 05:23 AM    NRBC 0.0 03/15/2019 09:10 AM    SEGSPCT 74 08/20/2013 10:45 AM    LYMPHOPCT 15.7 07/09/2022 05:23 AM    MONOPCT 5.2 07/09/2022 05:23 AM    MYELOPCT 0.9 07/09/2022 12:30 AM    EOSPCT 1 06/16/2017 12:00 AM    BASOPCT 0.1 07/09/2022 05:23 AM    MONOSABS 0.37 07/09/2022 05:23 AM    LYMPHSABS 1.18 07/09/2022 05:23 AM    EOSABS 0.00 07/09/2022 05:23 AM    BASOSABS 0.00 07/09/2022 05:23 AM     CMP:    Lab Results perihilar and lower lobe   airspace disease more prominent within the right lung. 2. Moderate size right pleural effusion. 3. There is no pneumothorax.             Resident's Assessment and Plan   ASSESSMENT:  1. Acute encephalopathy, multifactorial 2/2 septic shock, digoxin toxicity, uremia, improving  2. Acute hypoxic hypercapnic respiratory failure 2/2 mod R pleural effusion, possible HAP vs acute decompensated  HFpEF exacerbation. -2D echo:7/6/2022: EF 70-75%, indeterminate DD, N LV size and function, moderately to  severely dilated LA,  moderately dilated  RV, mildly enlarged RA, RSVP at least 60  mmHg, no AS, MS, mild TR, dilated IVC  3. Pulmonary hypertension likely Type II 2/2 acute decompensated diastolic heart failure, in the setting of Hx SERENA  4. Moderate R pleural effusion, likely 2/2 HAP (Klebsiella pneumoniae) vs acute decompensated heart failure. Pleural fluid/serum LDH 0.59, PF/serum protein 0.41. Pending cultures. CXR-complete aeration of R lung field. 5. HAP (Klebsiella pneumoniae on resp culture, light growth) D4 Zosyn)  5. Shock, likely septic (HAP) less likely cardiogenic (small pericardial effusion on CT)  6. Bradycardia, hypotension 2/2 Digoxin toxicity in the setting of SHANTANU. Digoxin level 3.7>>2  7. Elevated pro-BNP, multifactorial, likely acute decompensated HFpEF, septic shock. 2D echo as above  8. Elevated troponin likely 2/2 demand ischemia  9. Permanent atrial fibrillation, rate controlled. 10. SHANTANU Stage III on CKD multifactorial, pre-renal (hemodynamically mediated, DHN 2/2 diuretic use, poor oral intake); vs ATN 2/2 septic shock. UO 1.75 L/24 hrs, crea trending down to 2.3 mg/dL. 11Thrombocytopenia, etiology? Likely sepsis?, less likely HIT (did not receive any heparin products). D-dimer and fibrinogen wnl, INR 2 today. S/p 4 units platelet->46 today  12. Acute on chronic macrocytic anemia, multifactorial, sepsis, blood draws, r/o hemolysis, no overt signs of bleeding.  Hb 7.4 today, platelet 46, iron studies more of anemia of chronic disease, Vit B12, folate wnl, no overt bleeding, haptoglobin 175  11. Hyperkalemia 2/2 SHANTANU, resolved. 12. HAGMA 2/2 lactic acidosis (hypoperfusion, septic shock?),  uremia 2/2 SHANTANU, improving. Lactic acid down to 1.7  13. Hx of asthma  14. Hx of SERENA. Not using CPAP at home  15. Hx of HTN  16. Hx of CAD  17. Hx of Type 2 DM with neuropathy. On Lantus 10 and Novolog SS at home  18. Hx of depression/anxiety. 19. Hx of restless leg syndrome  20. Hx of Parkinson's disease. On Sinemet and ropinirole at home, which was discontinued upon discharge at [facility]:  -CXR, ABG daily  -CMP daily, BMP, Mg, Phos q12hrs  -follow pleural fluid culture, fungal, AFB stain  -PT, INR, APTT daily  -POCT glucose q4hrs  -SAT daily   -will try SBT again tomorrow, will wean off sedation for today  -wean fentanyl drip   -start Precedex drip  -decrease Versed to  0.5 mg q2hrs prn x anxiety/agitation  -wean levophed drip, started on low dose midodrine 2.5 mg TID/NGT  -resume aspirin and apixaban  -will give another dose of albumin and furosemide today for diuresis  -continue piperacillin-tazobactam, plan to complete 5-7 day course  -continue Lantus 5 units HS and LDSS   -continue famotidine 10 mg IV daily( renally dosed)  -monitor chest tube output Qshift and record, will remove chest tube once out[put is less than or equal to 150 cc in 24 hrs  -avoid NSAIDs and other nephrotoxic agents  -continue to monitor renal status  -monitor neuro status and hemodynamics  -strict I & O  -keep HOB 30-45 degrees    PT/OT:   DVT/GI prophylaxis: apixaban/famotidine  Disposition: continue current Carolyn Cuenca MD, PGY-2  Attending physician: Dr. Shi Cortse    I personally saw, examined and provided care for the patient. Radiographs, labs and medication list were reviewed by me independently. I spoke with bedside nursing, therapists and consultants.  Critical care services and times documented are independent of procedures and multidisciplinary rounds with Residents. Additionally comprehensive, multidisciplinary rounds were conducted with the MICU team. The case was discussed in detail and plans for care were established. Review of Residents documentation was conducted and revisions were made as appropriate. I agree with the above documented exam, problem list and plan of care with the following additions:    Chest XR much better s/p pigtail  Transudate fluid  Proceed with vent weaning  Negative fluid balance  Abx to cover klebsiella pneumonia  Discussed with son    36 minutes of CCT spent with the patient, reviewing the chart including imaging studies, and discussing the case with other health care professionals. This time excludes procedures.      Roxana Ness MD

## 2022-07-10 ENCOUNTER — APPOINTMENT (OUTPATIENT)
Dept: GENERAL RADIOLOGY | Age: 72
DRG: 870 | End: 2022-07-10
Payer: MEDICARE

## 2022-07-10 LAB
AADO2: 80.9 MMHG
AADO2: 90.6 MMHG
ALBUMIN SERPL-MCNC: 3.1 G/DL (ref 3.5–5.2)
ALP BLD-CCNC: 54 U/L (ref 35–104)
ALT SERPL-CCNC: 8 U/L (ref 0–32)
ANGLE (CLOT STRENGTH): 44.4 DEGREE (ref 59–74)
ANION GAP SERPL CALCULATED.3IONS-SCNC: 13 MMOL/L (ref 7–16)
APTT: 31.6 SEC (ref 24.5–35.1)
AST SERPL-CCNC: 12 U/L (ref 0–31)
B.E.: 1.1 MMOL/L (ref -3–3)
B.E.: 1.7 MMOL/L (ref -3–3)
B.E.: 4.7 MMOL/L (ref -3–3)
BASOPHILS ABSOLUTE: 0 E9/L (ref 0–0.2)
BASOPHILS RELATIVE PERCENT: 0 % (ref 0–2)
BILIRUB SERPL-MCNC: 0.7 MG/DL (ref 0–1.2)
BLOOD BANK DISPENSE STATUS: NORMAL
BLOOD BANK PRODUCT CODE: NORMAL
BLOOD CULTURE, ROUTINE: NORMAL
BPU ID: NORMAL
BUN BLDV-MCNC: 78 MG/DL (ref 6–23)
CALCIUM SERPL-MCNC: 8 MG/DL (ref 8.6–10.2)
CHLORIDE BLD-SCNC: 108 MMOL/L (ref 98–107)
CO2: 27 MMOL/L (ref 22–29)
COHB: 0.5 % (ref 0–1.5)
COHB: 0.6 % (ref 0–1.5)
COHB: 0.8 % (ref 0–1.5)
COMMENT: ABNORMAL
CREAT SERPL-MCNC: 1.8 MG/DL (ref 0.5–1)
CRITICAL: ABNORMAL
CULTURE, BLOOD 2: NORMAL
DATE ANALYZED: ABNORMAL
DATE OF COLLECTION: ABNORMAL
DESCRIPTION BLOOD BANK: NORMAL
EOSINOPHILS ABSOLUTE: 0 E9/L (ref 0.05–0.5)
EOSINOPHILS RELATIVE PERCENT: 0 % (ref 0–6)
EPL-TEG: 0 % (ref 0–15)
FIBRINOGEN: 286 MG/DL (ref 200–400)
FIO2: 40 %
FIO2: 40 %
G-TEG: 3.2 K D/SC (ref 4.5–11)
GFR AFRICAN AMERICAN: 33
GFR NON-AFRICAN AMERICAN: 28 ML/MIN/1.73
GLUCOSE BLD-MCNC: 126 MG/DL (ref 74–99)
HCO3: 26.3 MMOL/L (ref 22–26)
HCO3: 27.1 MMOL/L (ref 22–26)
HCO3: 27.9 MMOL/L (ref 22–26)
HCT VFR BLD CALC: 22.6 % (ref 34–48)
HCT VFR BLD CALC: 23.2 % (ref 34–48)
HCT VFR BLD CALC: 23.7 % (ref 34–48)
HEMOGLOBIN: 6.9 G/DL (ref 11.5–15.5)
HEMOGLOBIN: 7 G/DL (ref 11.5–15.5)
HEMOGLOBIN: 7.2 G/DL (ref 11.5–15.5)
HHB: 1 % (ref 0–5)
HHB: 1.5 % (ref 0–5)
HHB: 1.9 % (ref 0–5)
IMMATURE GRANULOCYTES #: 0.02 E9/L
IMMATURE GRANULOCYTES %: 0.8 % (ref 0–5)
INR BLD: 3
K (CLOTTING TIME): 5.1 MIN (ref 1–3)
LAB: ABNORMAL
LY30 (FIBRINOLYSIS): 0 % (ref 0–8)
LYMPHOCYTES ABSOLUTE: 0.74 E9/L (ref 1.5–4)
LYMPHOCYTES RELATIVE PERCENT: 28.6 % (ref 20–42)
Lab: ABNORMAL
MA (MAX AMPLITUDE): 39 MM (ref 50–70)
MAGNESIUM: 1.9 MG/DL (ref 1.6–2.6)
MAGNESIUM: 2 MG/DL (ref 1.6–2.6)
MCH RBC QN AUTO: 25.5 PG (ref 26–35)
MCH RBC QN AUTO: 26 PG (ref 26–35)
MCHC RBC AUTO-ENTMCNC: 29.7 % (ref 32–34.5)
MCHC RBC AUTO-ENTMCNC: 31 % (ref 32–34.5)
MCV RBC AUTO: 84 FL (ref 80–99.9)
MCV RBC AUTO: 85.6 FL (ref 80–99.9)
METER GLUCOSE: 131 MG/DL (ref 74–99)
METER GLUCOSE: 144 MG/DL (ref 74–99)
METER GLUCOSE: 147 MG/DL (ref 74–99)
METER GLUCOSE: 165 MG/DL (ref 74–99)
METER GLUCOSE: 180 MG/DL (ref 74–99)
METER GLUCOSE: 188 MG/DL (ref 74–99)
METHB: 0.3 % (ref 0–1.5)
METHB: 0.4 % (ref 0–1.5)
METHB: 0.6 % (ref 0–1.5)
MODE: ABNORMAL
MODE: ABNORMAL
MODE: AC
MONOCYTES ABSOLUTE: 0.21 E9/L (ref 0.1–0.95)
MONOCYTES RELATIVE PERCENT: 8.1 % (ref 2–12)
NEUTROPHILS ABSOLUTE: 1.62 E9/L (ref 1.8–7.3)
NEUTROPHILS RELATIVE PERCENT: 62.5 % (ref 43–80)
O2 SATURATION: 98.7 % (ref 92–98.5)
O2 SATURATION: 99.1 % (ref 92–98.5)
O2 SATURATION: 99.4 % (ref 92–98.5)
O2HB: 97.2 % (ref 94–97)
O2HB: 97.4 % (ref 94–97)
O2HB: 97.8 % (ref 94–97)
OPERATOR ID: 1926
OPERATOR ID: 366
OPERATOR ID: ABNORMAL
PATIENT TEMP: 37 C
PCO2: 35.3 MMHG (ref 35–45)
PCO2: 44.9 MMHG (ref 35–45)
PCO2: 47.2 MMHG (ref 35–45)
PDW BLD-RTO: 15.2 FL (ref 11.5–15)
PDW BLD-RTO: 15.5 FL (ref 11.5–15)
PEEP/CPAP: 5 CMH2O
PEEP/CPAP: 8 CMH2O
PFO2: 3.5 MMHG/%
PFO2: 3.6 MMHG/%
PH BLOOD GAS: 7.38 (ref 7.35–7.45)
PH BLOOD GAS: 7.39 (ref 7.35–7.45)
PH BLOOD GAS: 7.51 (ref 7.35–7.45)
PHOSPHORUS: 2.7 MG/DL (ref 2.5–4.5)
PHOSPHORUS: 3.2 MG/DL (ref 2.5–4.5)
PLATELET # BLD: 13 E9/L (ref 130–450)
PLATELET # BLD: 15 E9/L (ref 130–450)
PLATELET CONFIRMATION: NORMAL
PLATELET CONFIRMATION: NORMAL
PMV BLD AUTO: 12.5 FL (ref 7–12)
PMV BLD AUTO: ABNORMAL FL (ref 7–12)
PO2: 140 MMHG (ref 75–100)
PO2: 144 MMHG (ref 75–100)
PO2: 219.2 MMHG (ref 75–100)
POTASSIUM SERPL-SCNC: 3.9 MMOL/L (ref 3.5–5)
PROTHROMBIN TIME: 33.4 SEC (ref 9.3–12.4)
PS: 8 CMH20
R (REACTION TIME): 9 MIN (ref 5–10)
RBC # BLD: 2.69 E12/L (ref 3.5–5.5)
RBC # BLD: 2.71 E12/L (ref 3.5–5.5)
RI(T): 0.58
RI(T): 0.63
RR MECHANICAL: 14 B/MIN
SODIUM BLD-SCNC: 148 MMOL/L (ref 132–146)
SOURCE, BLOOD GAS: ABNORMAL
THB: 7.4 G/DL (ref 11.5–16.5)
THB: 7.8 G/DL (ref 11.5–16.5)
THB: 8 G/DL (ref 11.5–16.5)
TIME ANALYZED: 1326
TIME ANALYZED: 2129
TIME ANALYZED: 447
TOTAL PROTEIN: 5 G/DL (ref 6.4–8.3)
VT MECHANICAL: 350 ML
WBC # BLD: 2.6 E9/L (ref 4.5–11.5)
WBC # BLD: 3 E9/L (ref 4.5–11.5)

## 2022-07-10 PROCEDURE — 85027 COMPLETE CBC AUTOMATED: CPT

## 2022-07-10 PROCEDURE — 80053 COMPREHEN METABOLIC PANEL: CPT

## 2022-07-10 PROCEDURE — 85018 HEMOGLOBIN: CPT

## 2022-07-10 PROCEDURE — P9047 ALBUMIN (HUMAN), 25%, 50ML: HCPCS | Performed by: INTERNAL MEDICINE

## 2022-07-10 PROCEDURE — 2580000003 HC RX 258: Performed by: STUDENT IN AN ORGANIZED HEALTH CARE EDUCATION/TRAINING PROGRAM

## 2022-07-10 PROCEDURE — A4216 STERILE WATER/SALINE, 10 ML: HCPCS | Performed by: INTERNAL MEDICINE

## 2022-07-10 PROCEDURE — 2500000003 HC RX 250 WO HCPCS: Performed by: INTERNAL MEDICINE

## 2022-07-10 PROCEDURE — 6360000002 HC RX W HCPCS: Performed by: INTERNAL MEDICINE

## 2022-07-10 PROCEDURE — 85384 FIBRINOGEN ACTIVITY: CPT

## 2022-07-10 PROCEDURE — 6370000000 HC RX 637 (ALT 250 FOR IP)

## 2022-07-10 PROCEDURE — 94640 AIRWAY INHALATION TREATMENT: CPT

## 2022-07-10 PROCEDURE — 2700000000 HC OXYGEN THERAPY PER DAY

## 2022-07-10 PROCEDURE — 36430 TRANSFUSION BLD/BLD COMPNT: CPT

## 2022-07-10 PROCEDURE — 85610 PROTHROMBIN TIME: CPT

## 2022-07-10 PROCEDURE — 83735 ASSAY OF MAGNESIUM: CPT

## 2022-07-10 PROCEDURE — 85730 THROMBOPLASTIN TIME PARTIAL: CPT

## 2022-07-10 PROCEDURE — 6370000000 HC RX 637 (ALT 250 FOR IP): Performed by: INTERNAL MEDICINE

## 2022-07-10 PROCEDURE — 84100 ASSAY OF PHOSPHORUS: CPT

## 2022-07-10 PROCEDURE — 2500000003 HC RX 250 WO HCPCS: Performed by: STUDENT IN AN ORGANIZED HEALTH CARE EDUCATION/TRAINING PROGRAM

## 2022-07-10 PROCEDURE — 2580000003 HC RX 258

## 2022-07-10 PROCEDURE — 71045 X-RAY EXAM CHEST 1 VIEW: CPT

## 2022-07-10 PROCEDURE — 2000000000 HC ICU R&B

## 2022-07-10 PROCEDURE — 82805 BLOOD GASES W/O2 SATURATION: CPT

## 2022-07-10 PROCEDURE — 6360000002 HC RX W HCPCS: Performed by: STUDENT IN AN ORGANIZED HEALTH CARE EDUCATION/TRAINING PROGRAM

## 2022-07-10 PROCEDURE — 99232 SBSQ HOSP IP/OBS MODERATE 35: CPT | Performed by: NURSE PRACTITIONER

## 2022-07-10 PROCEDURE — S5553 INSULIN LONG ACTING 5 U: HCPCS | Performed by: INTERNAL MEDICINE

## 2022-07-10 PROCEDURE — 94660 CPAP INITIATION&MGMT: CPT

## 2022-07-10 PROCEDURE — 85347 COAGULATION TIME ACTIVATED: CPT

## 2022-07-10 PROCEDURE — 94003 VENT MGMT INPAT SUBQ DAY: CPT

## 2022-07-10 PROCEDURE — 85014 HEMATOCRIT: CPT

## 2022-07-10 PROCEDURE — 85576 BLOOD PLATELET AGGREGATION: CPT

## 2022-07-10 PROCEDURE — 85025 COMPLETE CBC W/AUTO DIFF WBC: CPT

## 2022-07-10 PROCEDURE — 2580000003 HC RX 258: Performed by: INTERNAL MEDICINE

## 2022-07-10 PROCEDURE — 6360000002 HC RX W HCPCS

## 2022-07-10 PROCEDURE — 82962 GLUCOSE BLOOD TEST: CPT

## 2022-07-10 RX ORDER — MIDODRINE HYDROCHLORIDE 5 MG/1
2.5 TABLET ORAL 3 TIMES DAILY PRN
Status: DISCONTINUED | OUTPATIENT
Start: 2022-07-10 | End: 2022-07-12

## 2022-07-10 RX ORDER — SODIUM CHLORIDE 9 MG/ML
INJECTION, SOLUTION INTRAVENOUS PRN
Status: DISCONTINUED | OUTPATIENT
Start: 2022-07-10 | End: 2022-07-11

## 2022-07-10 RX ORDER — POTASSIUM CHLORIDE 29.8 MG/ML
20 INJECTION INTRAVENOUS ONCE
Status: COMPLETED | OUTPATIENT
Start: 2022-07-10 | End: 2022-07-10

## 2022-07-10 RX ORDER — MAGNESIUM SULFATE 1 G/100ML
1000 INJECTION INTRAVENOUS ONCE
Status: COMPLETED | OUTPATIENT
Start: 2022-07-10 | End: 2022-07-10

## 2022-07-10 RX ORDER — METOPROLOL TARTRATE 5 MG/5ML
5 INJECTION INTRAVENOUS EVERY 6 HOURS
Status: DISCONTINUED | OUTPATIENT
Start: 2022-07-10 | End: 2022-07-11

## 2022-07-10 RX ORDER — FUROSEMIDE 10 MG/ML
40 INJECTION INTRAMUSCULAR; INTRAVENOUS 2 TIMES DAILY
Status: DISCONTINUED | OUTPATIENT
Start: 2022-07-10 | End: 2022-07-14

## 2022-07-10 RX ORDER — ALBUMIN (HUMAN) 12.5 G/50ML
25 SOLUTION INTRAVENOUS 2 TIMES DAILY
Status: COMPLETED | OUTPATIENT
Start: 2022-07-10 | End: 2022-07-11

## 2022-07-10 RX ADMIN — ATORVASTATIN CALCIUM 20 MG: 20 TABLET, FILM COATED ORAL at 08:57

## 2022-07-10 RX ADMIN — MAGNESIUM SULFATE HEPTAHYDRATE 1000 MG: 1 INJECTION, SOLUTION INTRAVENOUS at 20:35

## 2022-07-10 RX ADMIN — INSULIN LISPRO 1 UNITS: 100 INJECTION, SOLUTION INTRAVENOUS; SUBCUTANEOUS at 00:55

## 2022-07-10 RX ADMIN — FUROSEMIDE 40 MG: 10 INJECTION, SOLUTION INTRAMUSCULAR; INTRAVENOUS at 14:22

## 2022-07-10 RX ADMIN — BUDESONIDE 500 MCG: 0.5 SUSPENSION RESPIRATORY (INHALATION) at 21:21

## 2022-07-10 RX ADMIN — SODIUM CHLORIDE, PRESERVATIVE FREE 10 MG: 5 INJECTION INTRAVENOUS at 08:55

## 2022-07-10 RX ADMIN — PIPERACILLIN AND TAZOBACTAM 4500 MG: 4; .5 INJECTION, POWDER, FOR SOLUTION INTRAVENOUS at 04:29

## 2022-07-10 RX ADMIN — POTASSIUM CHLORIDE 20 MEQ: 29.8 INJECTION, SOLUTION INTRAVENOUS at 08:58

## 2022-07-10 RX ADMIN — SODIUM CHLORIDE, PRESERVATIVE FREE 10 ML: 5 INJECTION INTRAVENOUS at 14:27

## 2022-07-10 RX ADMIN — IPRATROPIUM BROMIDE AND ALBUTEROL SULFATE 1 AMPULE: .5; 2.5 SOLUTION RESPIRATORY (INHALATION) at 08:47

## 2022-07-10 RX ADMIN — CHLORHEXIDINE GLUCONATE 15 ML: 1.2 RINSE ORAL at 08:54

## 2022-07-10 RX ADMIN — PIPERACILLIN AND TAZOBACTAM 4500 MG: 4; .5 INJECTION, POWDER, FOR SOLUTION INTRAVENOUS at 16:32

## 2022-07-10 RX ADMIN — METOPROLOL TARTRATE 5 MG: 1 INJECTION, SOLUTION INTRAVENOUS at 20:29

## 2022-07-10 RX ADMIN — SODIUM CHLORIDE 0.6 MCG/KG/HR: 9 INJECTION, SOLUTION INTRAVENOUS at 20:08

## 2022-07-10 RX ADMIN — METOPROLOL TARTRATE 25 MG: 25 TABLET, FILM COATED ORAL at 08:57

## 2022-07-10 RX ADMIN — INSULIN LISPRO 1 UNITS: 100 INJECTION, SOLUTION INTRAVENOUS; SUBCUTANEOUS at 18:26

## 2022-07-10 RX ADMIN — AMIODARONE HYDROCHLORIDE 1 MG/MIN: 50 INJECTION, SOLUTION INTRAVENOUS at 21:16

## 2022-07-10 RX ADMIN — MIDAZOLAM 0.5 MG: 1 INJECTION INTRAMUSCULAR; INTRAVENOUS at 00:55

## 2022-07-10 RX ADMIN — BUDESONIDE 500 MCG: 0.5 SUSPENSION RESPIRATORY (INHALATION) at 08:47

## 2022-07-10 RX ADMIN — IPRATROPIUM BROMIDE AND ALBUTEROL SULFATE 1 AMPULE: .5; 2.5 SOLUTION RESPIRATORY (INHALATION) at 13:35

## 2022-07-10 RX ADMIN — INSULIN GLARGINE-YFGN 5 UNITS: 100 INJECTION, SOLUTION SUBCUTANEOUS at 20:33

## 2022-07-10 RX ADMIN — ALBUMIN (HUMAN) 25 G: 0.25 INJECTION, SOLUTION INTRAVENOUS at 14:23

## 2022-07-10 RX ADMIN — AMIODARONE HYDROCHLORIDE 200 MG: 200 TABLET ORAL at 08:57

## 2022-07-10 RX ADMIN — SODIUM CHLORIDE 1 MCG/KG/HR: 9 INJECTION, SOLUTION INTRAVENOUS at 06:35

## 2022-07-10 RX ADMIN — SODIUM CHLORIDE, PRESERVATIVE FREE 10 ML: 5 INJECTION INTRAVENOUS at 08:57

## 2022-07-10 RX ADMIN — IPRATROPIUM BROMIDE AND ALBUTEROL SULFATE 1 AMPULE: .5; 2.5 SOLUTION RESPIRATORY (INHALATION) at 17:07

## 2022-07-10 RX ADMIN — ALBUMIN (HUMAN) 25 G: 0.25 INJECTION, SOLUTION INTRAVENOUS at 20:17

## 2022-07-10 RX ADMIN — IPRATROPIUM BROMIDE AND ALBUTEROL SULFATE 1 AMPULE: .5; 2.5 SOLUTION RESPIRATORY (INHALATION) at 21:21

## 2022-07-10 RX ADMIN — INSULIN LISPRO 1 UNITS: 100 INJECTION, SOLUTION INTRAVENOUS; SUBCUTANEOUS at 20:33

## 2022-07-10 ASSESSMENT — PULMONARY FUNCTION TESTS
PIF_VALUE: 26
PIF_VALUE: 14
PIF_VALUE: 28
PIF_VALUE: 18
PIF_VALUE: 24
PIF_VALUE: 28
PIF_VALUE: 29
PIF_VALUE: 28
PIF_VALUE: 14
PIF_VALUE: 13
PIF_VALUE: 28
PIF_VALUE: 14
PIF_VALUE: 14
PIF_VALUE: 30
PIF_VALUE: 28
PIF_VALUE: 11
PIF_VALUE: 29
PIF_VALUE: 25
PIF_VALUE: 26
PIF_VALUE: 14
PIF_VALUE: 18
PIF_VALUE: 25

## 2022-07-10 ASSESSMENT — PAIN SCALES - GENERAL
PAINLEVEL_OUTOF10: 6
PAINLEVEL_OUTOF10: 0

## 2022-07-10 NOTE — PROGRESS NOTES
[address] [facility]  Internal Medicine Residency / [address]    Attending Physician Statement  I have discussed the case, including pertinent history and exam findings with the resident and the team.  I have seen and examined the patient and the key elements of the encounter have been performed by me. I agree with the assessment, plan and orders as documented by the resident. Off IV pressors and on midodrine  Resolving Septic shock  Recent pigtail placement chest for pleural effusion  Intubated and sedated precedex   Zosyn continues for klebsiella pneumonia  On amiodarone , metoprolol   Eliquis and ASA on hold   eliquis  Eventually extubate   Continuing ICU care     Remainder of medical problems as per resident note.       Erica Reddy MD [facility]  Internal Medicine Residency Faculty

## 2022-07-10 NOTE — PLAN OF CARE
Problem: Chronic Conditions and Co-morbidities  Goal: Patient's chronic conditions and co-morbidity symptoms are monitored and maintained or improved  Outcome: Not Progressing     Problem: Discharge Planning  Goal: Discharge to home or other facility with appropriate resources  Outcome: Not Progressing     Problem: Pain  Goal: Verbalizes/displays adequate comfort level or baseline comfort level  Outcome: Not Progressing     Problem: Skin/Tissue Integrity  Goal: Absence of new skin breakdown  Outcome: Not Progressing     Problem: Nutrition Deficit:  Goal: Optimize nutritional status  Outcome: Not Progressing     Problem: ABCDS Injury Assessment  Goal: Absence of physical injury  Outcome: Progressing     Problem: Safety - Medical Restraint  Goal: Remains free of injury from restraints (Restraint for Interference with Medical Device)  Outcome: Progressing     Problem: Safety - Adult  Goal: Free from fall injury  Outcome: Progressing

## 2022-07-10 NOTE — PLAN OF CARE
Problem: Safety - Medical Restraint  Goal: Remains free of injury from restraints (Restraint for Interference with Medical Device)  Description: INTERVENTIONS:  1. Determine that other, less restrictive measures have been tried or would not be effective before applying the restraint  2. Evaluate the patient's condition at the time of restraint application  3. Inform patient/family regarding the reason for restraint  4.  Q2H: Monitor safety, psychosocial status, comfort, nutrition and hydration  7/10/2022 1809 by Precious Mohs, RN  Outcome: Completed  7/10/2022 0923 by Precious Mohs, RN  Outcome: Progressing  Flowsheets  Taken 7/10/2022 0900  Remains free of injury from restraints (restraint for interference with medical device):   Determine that other, less restrictive measures have been tried or would not be effective before applying the restraint   Evaluate the patient's condition at the time of restraint application   Inform patient/family regarding the reason for restraint   Every 2 hours: Monitor safety, psychosocial status, comfort, nutrition and hydration  Taken 7/10/2022 0800  Remains free of injury from restraints (restraint for interference with medical device): Determine that other, less restrictive measures have been tried or would not be effective before applying the restraint  Taken 7/10/2022 0738  Remains free of injury from restraints (restraint for interference with medical device):   Determine that other, less restrictive measures have been tried or would not be effective before applying the restraint   Evaluate the patient's condition at the time of restraint application   Inform patient/family regarding the reason for restraint   Every 2 hours: Monitor safety, psychosocial status, comfort, nutrition and hydration

## 2022-07-10 NOTE — PROGRESS NOTES
[facility] MD Jaquan West MD Valri Mayans MD .  Progress note  Patient's Name: Mamadou Delgado  12:23 PM  7/10/2022      7/7 : seen in her room intubated mechanically ventilated fio2 40 . LE edema 1-2+ . On levophed . UO ok over last 24 hours     7/8 pt not in her room when coming to see her . She is in IR lab. Case d/w RN     7/9 seen in her room d/w ICU resident   Still on some pressors actually BP low when seeing her   Has CT in place with good drainage . Good UO in response to lasix via genao cath . Fio2 40 PEEP 8       7/10 seen in ICU intubated mechanically ventilated fio2 40 PEEP 8   1-2+ edema in UE and LEs   No pressors  On precedex drip     History of Present Ilness:      69-year old female with a past medical history of hypertension, A. fib, CAD, asthma, HFpEF, CKD, SERENA, hyperlipidemia, type II DM, anxiety/depression, Parkinson's disease, restless leg syndrome who presented in the ED for altered mental status.     She recently had a right second toe amputation back last month at [facility].  She was discharged after 2 weeks. According to the patient's family, she was doing okay until few hours prior to admission, patient was noted to be acting different yesterday morning. She was noted to be drowsy    Nephrology asked to see for SHANTANU   I did see pt in ICU she is intubated mechanically ventilated . She is on levophed . She has some LE edema. Vent setting stable .    UO marginal .       Past Medical History:   Diagnosis Date    A-fib ([address]     Acute on chronic congestive heart failure (HCC)     Anxiety     Asthma     CAD (coronary artery disease) 01/21/2016    Cancer [facility])  breast ca 2006    right lumpectomy    Chronic kidney disease     nephrolithiasis    Depression     Diabetes mellitus ([address]     H/O mammogram     Hx MRSA infection     toe infection january 2012    Hyperlipidemia     Hypertension     Lateral epicondylitis     SERENA on CPAP     [facility]     Tubal ligation status        Past Surgical History:   Procedure Laterality Date    BREAST LUMPECTOMY      BREAST REDUCTION SURGERY      CARDIAC CATHETERIZATION  4/28/2014    Dr. Kamryn Gomez  01/11/2022    Dr. Taty Emerson  7/29/15    CT GUIDED CHEST TUBE  7/8/2022    CT GUIDED CHEST TUBE 7/8/2022 Jordon Grant MD [facility] CT    ENDOSCOPY, COLON, DIAGNOSTIC  7/19/15    GALLBLADDER SURGERY      LUMBAR LAMINECTOMY      TOE AMPUTATION      TONSILLECTOMY      UPPER GASTROINTESTINAL ENDOSCOPY         Family History   Problem Relation Age of Onset    Cancer Mother         Lung Ca    Cancer Father         lung Ca    Diabetes Sister     Heart Disease Sister     Seizures Son     Other Brother         sepsis        reports that she has never smoked. She has never used smokeless tobacco. She reports that she does not drink alcohol and does not use drugs.     Allergies:  Ciprofloxacin and Codeine    Current Medications:    midodrine (PROAMATINE) tablet 2.5 mg, TID PRN  albumin human 25 % IV solution 25 g, BID  furosemide (LASIX) injection 40 mg, BID  dexmedetomidine (PRECEDEX) 1,000 mcg in sodium chloride 0.9 % 250 mL infusion, Continuous  midazolam PF (VERSED) injection 0.5 mg, Q2H PRN  famotidine (PEPCID) 10 mg in sodium chloride (PF) 10 mL injection, Daily  0.9 % sodium chloride infusion, PRN  budesonide (PULMICORT) nebulizer suspension 500 mcg, BID  levalbuterol (XOPENEX) nebulization 0.63 mg, Q4H PRN  metoprolol tartrate (LOPRESSOR) tablet 25 mg, BID  insulin lispro (HUMALOG) injection vial 0-6 Units, Q4H  insulin glargine-yfgn (SEMGLEE-YFGN) injection vial 5 Units, Nightly  fentaNYL 10 mcg/ml in 0.9%  ml infusion, Continuous  chlorhexidine (PERIDEX) 0.12 % solution 15 mL, BID  ipratropium-albuterol (DUONEB) nebulizer solution 1 ampule, Q4H WA  [Held by provider] aspirin chewable tablet 81 mg, Daily  polyethylene glycol (GLYCOLAX) packet 17 g, BID  amiodarone (CORDARONE) tablet 200 mg, BID  [START ON 7/14/2022] amiodarone (CORDARONE) tablet 200 mg, Daily  [Held by provider] apixaban (ELIQUIS) tablet 5 mg, BID  atorvastatin (LIPITOR) tablet 20 mg, Daily  sodium chloride flush 0.9 % injection 5-40 mL, 2 times per day  sodium chloride flush 0.9 % injection 5-40 mL, PRN  0.9 % sodium chloride infusion, PRN  acetaminophen (TYLENOL) tablet 650 mg, Q6H PRN   Or  acetaminophen (TYLENOL) suppository 650 mg, Q6H PRN  piperacillin-tazobactam (ZOSYN) 4,500 mg in dextrose 5 % 100 mL IVPB ([id number]), Q12H  glucose chewable tablet 16 g, PRN  dextrose bolus 10% 125 mL, PRN   Or  dextrose bolus 10% 250 mL, PRN  glucagon (rDNA) injection 1 mg, PRN  dextrose 5 % solution, PRN        Review of Systems:   Unable to obtain due to clinical conditon . Physical exam:   Vital signs /88   Pulse 77   Temp 99.2 °F (37.3 °C)   Resp 14   Ht 5' (1.524 m)   Wt 236 lb 11.2 oz (107.4 kg)   SpO2 98%   BMI 46.23 kg/m²   Gen : moderate distress  Head : at , nc   Neck : supple , no thyromegaly noted . Eyes : EOMI , PERRLA   CV : tachycardiac 1+ edema in LE   Lungs: good flow heard b/l   Abd : soft , NT , BS + , No Organomegaly appreciated . Skin : soft, dry . Neuro : CN  II-XII grossly intact , no focal neurologic deficit . Psych : cooperative .      Data:   Labs:  CBC with Differential:    Lab Results   Component Value Date/Time    WBC 2.6 07/10/2022 04:38 AM    RBC 2.69 07/10/2022 04:38 AM    HGB 7.2 07/10/2022 09:13 AM    HCT 23.7 07/10/2022 09:13 AM    PLT 15 07/10/2022 04:38 AM    MCV 84.0 07/10/2022 04:38 AM    MCH 26.0 07/10/2022 04:38 AM    MCHC 31.0 07/10/2022 04:38 AM    RDW 15.5 07/10/2022 04:38 AM    NRBC 0.0 03/15/2019 09:10 AM    SEGSPCT 74 08/20/2013 10:45 AM    LYMPHOPCT 28.6 07/10/2022 04:38 AM    MONOPCT 8.1 07/10/2022 04:38 AM    MYELOPCT 0.9 07/09/2022 12:30 AM    EOSPCT 1 06/16/2017 12:00 AM BASOPCT 0.0 07/10/2022 04:38 AM    MONOSABS 0.21 07/10/2022 04:38 AM    LYMPHSABS 0.74 07/10/2022 04:38 AM    EOSABS 0.00 07/10/2022 04:38 AM    BASOSABS 0.00 07/10/2022 04:38 AM     CMP:    Lab Results   Component Value Date/Time     07/10/2022 04:38 AM    K 3.9 07/10/2022 04:38 AM    K 5.0 07/06/2022 02:41 AM     07/10/2022 04:38 AM    CO2 27 07/10/2022 04:38 AM    BUN 78 07/10/2022 04:38 AM    CREATININE 1.8 07/10/2022 04:38 AM    GFRAA 33 07/10/2022 04:38 AM    LABGLOM 28 07/10/2022 04:38 AM    GLUCOSE 126 07/10/2022 04:38 AM    PROT 5.0 07/10/2022 04:38 AM    LABALBU 3.1 07/10/2022 04:38 AM    CALCIUM 8.0 07/10/2022 04:38 AM    BILITOT 0.7 07/10/2022 04:38 AM    ALKPHOS 54 07/10/2022 04:38 AM    AST 12 07/10/2022 04:38 AM    ALT 8 07/10/2022 04:38 AM     Ionized Calcium:  No results found for: IONCA  Magnesium:    Lab Results   Component Value Date/Time    MG 2.0 07/10/2022 04:38 AM     Phosphorus:    Lab Results   Component Value Date/Time    PHOS 2.7 07/10/2022 04:38 AM     U/A:    Lab Results   Component Value Date/Time    COLORU Yellow 07/05/2022 05:43 PM    PHUR 5.5 07/05/2022 05:43 PM    WBCUA 1-3 07/05/2022 05:43 PM    RBCUA NONE 07/05/2022 05:43 PM    RBCUA NONE 03/25/2013 09:00 PM    YEAST RARE 10/27/2015 07:18 PM    BACTERIA FEW 07/05/2022 05:43 PM    CLARITYU Clear 07/05/2022 05:43 PM    SPECGRAV 1.025 07/05/2022 05:43 PM    LEUKOCYTESUR Negative 07/05/2022 05:43 PM    UROBILINOGEN 0.2 07/05/2022 05:43 PM    BILIRUBINUR Negative 07/05/2022 05:43 PM    BLOODU Negative 07/05/2022 05:43 PM    GLUCOSEU Negative 07/05/2022 05:43 PM     Microalbumen/Creatinine ratio:  No components found for: RUCREAT  Iron Saturation:  No components found for: PERCENTFE  TIBC:    Lab Results   Component Value Date/Time    TIBC 152 07/09/2022 08:01 AM     FERRITIN:    Lab Results   Component Value Date/Time    FERRITIN 243 07/09/2022 08:01 AM        Imaging:  XR CHEST PORTABLE   Final Result   Lines and tubes are stable with no pneumothorax on the right. Some   improvement seen in the region of left lung base in the interval.         XR CHEST PORTABLE   Final Result   The evaluation is limited due to low lung volumes. No interval change compared to prior exam.      Lines and tubes are in unchanged position. XR ABDOMEN FOR NG/OG/NE TUBE PLACEMENT   Final Result   Catheter is in the stomach. XR CHEST PORTABLE   Final Result   Catheter placed with significant right lung aeration improvement and no   pneumothorax         MRI BRAIN WO CONTRAST   Final Result   1. No acute intracranial abnormality. 2. No gross epileptogenic lesion is identified. 3. Bilateral mastoid effusions, which may be due to eustachian tube   dysfunction or otomastoiditis. RECOMMENDATIONS:   Unavailable         CT GUIDED CHEST TUBE   Final Result   Successful uncomplicated  right chest tube placement      Recommendations:   1. Follow-up tube check in 2 weeks   2. Flush tube daily with 5 to 10 mL of sterile saline            XR CHEST PORTABLE   Final Result   1. There is no interval change in the bilateral perihilar and lower lobe   airspace disease more prominent within the right lung. 2. Moderate size right pleural effusion. 3. There is no pneumothorax. US RETROPERITONEAL COMPLETE   Final Result   1. Marked bilateral renal cortical thinning. Echogenic renal parenchyma. No hydronephrosis. 2.  A Diaz catheter appears to be decompressing the bladder. XR CHEST PORTABLE   Final Result   Increasing infiltrate throughout the right lung persistent left basilar   alveolar infiltrate is well. XR CHEST PORTABLE   Final Result   Adequately positioned right internal jugular central venous line. Otherwise,   no significant change compared to prior exam glued in lines and tubes.          US DUP LOWER EXTREMITIES BILATERAL VENOUS   Final Result   Within the visualized vessels there is no evidence for deep venous   thrombosis               XR CHEST PORTABLE   Final Result   1. No significant change. Cardiomegaly with right pleural effusion. 2.  Support tubes remain in appropriate position. CT HEAD WO CONTRAST   Final Result   No acute intracranial abnormality or significant change in the overall   appearance of the brain. MRI would be useful if symptoms persist.      RECOMMENDATIONS:   Unavailable         XR ABDOMEN FOR NG/OG/NE TUBE PLACEMENT   Final Result   Catheter is in the proximal stomach. XR CHEST PORTABLE   Final Result   Adequately positioned endotracheal tube. Nasogastric tube coursing below   diaphragm. Otherwise, stable exam.         CT ABDOMEN PELVIS WO CONTRAST Additional Contrast? None   Final Result   Increasing fluid within the abdomen when compared to the prior study. The   anasarca is also increased in appearance of the prior examination. Mild stranding seen around the mesentery which correlation to clinical lab   values is recommended to exclude possible pancreatitis or volume overload. Overall the appearance of the pancreas itself is grossly unremarkable. There   is only mild stranding seen throughout the mesenteric root. CT CHEST WO CONTRAST   Final Result   Bilateral lung infiltrates with greatest consolidation right lower lobe   probably secondary to atelectasis and or pneumonia. Moderate right pleural effusion. Cardiomegaly and small pericardial effusion. The pericardial effusion is new. Small volume right upper abdominal ascites along with anasarca. This is new. XR CHEST PORTABLE   Final Result   New opacity on the right which may relate to pleural effusion with adjacent   atelectasis and or infiltrate. Cardiomegaly and pulmonary vascular   congestion implying elevated left heart filling pressures.          XR CHEST PORTABLE    (Results Pending)       Assessment    Acute kidney injury non oligouric:   Baseline cr 0.9  Etiology of SHANTANU due to decrease effective in setting of intravascular volume depletion as evident by her need for levophed and her urine lytes with high avidity for cl and Na .   Basically bland urine sediment which make GN/vasculitis unlikely   High BUN in part due to steroids   LE edema noted though she is still on levophed        Continue hemodynamic support  Guide Abx per pharmacy dosing    Start lasix 40 mg iv bid along with SPA  She likely benefit from trying to unload her hypervolemia prior to SBT and potential extubation          -Encephalopathy metabolic multifactorial : per ICU team     -Digoxin toxicity s/p digbind    -Shock R/O septic shock               Electronically signed by Luz García MD on 7/10/2022 at 12:23 PM

## 2022-07-10 NOTE — PROGRESS NOTES
[facility]  Internal [facility] Residency Program  Progress Note - House Team     Patient:  Indy Muñoz 66 y.o. female MRN: [id number]     Date of Service: 7/10/2022     CC: altered mental status  Overnight events: No overnight events noted. Subjective     Patient was seen and examined this morning at bedside. Remains intubated, sedated on precedex, off pressor. She appears calmer today, opens eyes on verbal stimuli, and follows commands    Overnight, no overnight events noted. She continues have serosanguineous drain on chest tube, total of 260cc for that last 24hours. Objective     Physical Exam:  Vitals: BP (!) 160/76   Pulse 77   Temp 99.1 °F (37.3 °C)   Resp 14   Ht 5' (1.524 m)   Wt 236 lb 11.2 oz (107.4 kg)   SpO2 100%   BMI 46.23 kg/m²     I & O - 24hr: I/O this shift: In: [de-identified] [I.V.:20; NG/GT:60]  Out: 680 [Urine:680]   General Appearance: intubated, sedated  HEENT:  Head: Normal, normocephalic, atraumatic. Neck: no adenopathy, no carotid bruit, no JVD, supple, symmetrical, trachea midline and thyroid not enlarged, symmetric, no tenderness/mass/nodules  Lung: clear to auscultation bilaterally  Heart: irregular rhythm, tachycardia, S1, S2 normal, no murmur, click, rub or gallop  Abdomen: soft, non-tender; bowel sounds normal; no masses,  no organomegaly  Extremities:  edema +2 on BLE  Musculokeletal: No joint swelling, no muscle tenderness. ROM normal in all joints of extremities.    Neurologic: Mental status: intubated, sedated, awake, follows commands, agitated  Subject  Pertinent Labs & Imaging Studies   genesis  CBC with Differential:    Lab Results   Component Value Date/Time    WBC 2.6 07/10/2022 04:38 AM    RBC 2.69 07/10/2022 04:38 AM    HGB 7.2 07/10/2022 09:13 AM    HCT 23.7 07/10/2022 09:13 AM    PLT 15 07/10/2022 04:38 AM    MCV 84.0 07/10/2022 04:38 AM    MCH 26.0 07/10/2022 04:38 AM    MCHC 31.0 07/10/2022 04:38 AM    RDW 15.5 07/10/2022 04:38 AM    NRBC 0.0 03/15/2019 09:10 AM    SEGSPCT 74 08/20/2013 10:45 AM    LYMPHOPCT 28.6 07/10/2022 04:38 AM    MONOPCT 8.1 07/10/2022 04:38 AM    MYELOPCT 0.9 07/09/2022 12:30 AM    EOSPCT 1 06/16/2017 12:00 AM    BASOPCT 0.0 07/10/2022 04:38 AM    MONOSABS 0.21 07/10/2022 04:38 AM    LYMPHSABS 0.74 07/10/2022 04:38 AM    EOSABS 0.00 07/10/2022 04:38 AM    BASOSABS 0.00 07/10/2022 04:38 AM     CMP:    Lab Results   Component Value Date/Time     07/10/2022 04:38 AM    K 3.9 07/10/2022 04:38 AM    K 5.0 07/06/2022 02:41 AM     07/10/2022 04:38 AM    CO2 27 07/10/2022 04:38 AM    BUN 78 07/10/2022 04:38 AM    CREATININE 1.8 07/10/2022 04:38 AM    GFRAA 33 07/10/2022 04:38 AM    LABGLOM 28 07/10/2022 04:38 AM    GLUCOSE 126 07/10/2022 04:38 AM    PROT 5.0 07/10/2022 04:38 AM    LABALBU 3.1 07/10/2022 04:38 AM    CALCIUM 8.0 07/10/2022 04:38 AM    BILITOT 0.7 07/10/2022 04:38 AM    ALKPHOS 54 07/10/2022 04:38 AM    AST 12 07/10/2022 04:38 AM    ALT 8 07/10/2022 04:38 AM     Magnesium:    Lab Results   Component Value Date/Time    MG 2.0 07/10/2022 04:38 AM     Phosphorus:    Lab Results   Component Value Date/Time    PHOS 2.7 07/10/2022 04:38 AM       XR CHEST PORTABLE   Final Result   Lines and tubes are stable with no pneumothorax on the right. Some   improvement seen in the region of left lung base in the interval.         XR CHEST PORTABLE   Final Result   The evaluation is limited due to low lung volumes. No interval change compared to prior exam.      Lines and tubes are in unchanged position. XR ABDOMEN FOR NG/OG/NE TUBE PLACEMENT   Final Result   Catheter is in the stomach. XR CHEST PORTABLE   Final Result   Catheter placed with significant right lung aeration improvement and no   pneumothorax         MRI BRAIN WO CONTRAST   Final Result   1. No acute intracranial abnormality. 2. No gross epileptogenic lesion is identified.    3. Bilateral mastoid effusions, which may be due to eustachian tube exacerbation of HF  Echo showed EF 70-75%, indeterminate DD, normal LV size and function with no pericardial effusion noted  Elevated troponin likely 2/2 demand ischemia -troponin 57 -> 53; EKG showed no ischemic changes  Atrial fibrillation, on eliquis 5mg BID and metoprolol 50mg BID at home  Bradycardia likely 2/2 digoxin toxicity in the setting of SHANTANU - resolved  Pulmonary HTN likely Type II 2/2 acute decompensated heart failure  Acute hypoxic hypercapnic respiratory failure 2/2 pleural effusion, possible HAP vs pericardial effusion vs acute HFpEF exacerbation  Pleural effusion as well as small pericardial effusion seen on imaging  proBNP 41,368  PE ruled out, US BLE negative for DVT  S/p IR pigtail catheter placement, 950cc fluid taken out; fluid analysis c/w transudative with fluid/serum LDH 0.59 and PF/serum protein 0.41  Acute pancreatitis; lipase 148; CT A/P showed mild stranding around the mesentery concerning for pancreatitis versus volume overload, although overall appearance of pancreas itself is grossly unremarkable.   SHANTANU Stage III on CKD - improving - baseline Crea 0.8-0.9, sCr 3.3 on presentation -> 1.8 today; FEUrea: 6.5% -> prerenal likely multifactorial, prerenal (cardiorenal syndrome, dehydration) vs ATN in the setting of septic shock  Hyperkalemia - resolved; K 5.5 -> 3.3  HAGMA 2/2 lactic acidosis, uremia AG 15 -> 23; lactic acid and uremia elevated  Elevated INR  Hx of Type 2 DM with neuropathy, lantus 10 and sliding scale at home  Hx of asthma, on montelukast 10mg daily  Hx of SERENA  Hx of HTN   Hx of CAD  Hx of HFpEF (EF  60% on echo in 4/2022); on furosemide 20mg daily and metoprolol 50mg BID  Hx of hypothyroidism   Hx of depression/anxiety, on seroquel 25mg in AM and 50mg in PM; divalproex 250mg BID  Hx of restless leg syndrome, on ropinirole 1mg TID at home  Hx of Parkinson's disease, was previously on carbi-dopa; discontinued upon discharge at [address]:  Care per ICU team  Weaning sedation, monitor mentation  Follow EEG results  Depakote for agitation if needed and restart Sinemet post extubation per neurology  Monitor BP off pressor  Off hydrocortisone  Midodrine 2.5mg TID PRN  Cardio consulted, appreciate recommendations  Continue amiodarone and lopressor 25 mg BID for A fib RVR  Eliquis and aspirin resumed  Follow thoracentesis results  Wean on vent as tolerated  Daily SBT  Continue chest tube  Continue Zosyn day 5  Diuresis per nephro  Continue on lantus 5u and LDSS  Monitor BG q4h  Hypoglycemia protocol in place  Appreciate input from specialties    PT/OT evaluation: not indicated at this time  DVT prophylaxis/ GI prophylaxis: eliquis/famotidine  Disposition: continue current care    Peyton Sol MD, PGY-2  Attending physician: Dr. Gallardo Jemima Detail Level: Detailed

## 2022-07-10 NOTE — PROGRESS NOTES
Patient was extubated to avaps 18 400 mls +8 40%. Breath Sounds post extubation were diminished. Stridor was not present post extubation. SPO2 was 100%.       Performed by  Cassandra Owusu RCP

## 2022-07-10 NOTE — PROGRESS NOTES
Jeannie Montalvo is a 66 y.o. right handed woman. Neurology is following for new onset seizures    PMH: Parkinson's disease, SERENA on CPAP, HLD, HTN, CKD, breast cancer s/p lumpectomy, anxiety, diabetes, AF on [address], lumbar laminectomy   Pertinent family history: Son has seizures    She presented on 7/5 with an altered mental status and hypotension (84/49), hypoxia, dig toxicity, bradycardia (lowest documented was 58), possible pancreatitis, pneumonia, and SHANTANU. She recently had a change in her psychiatric medications and a right toe amputation on 6/17. Witnessed, non-described sz 7/6. She follows with Tanya Sharma in the office for Parkinson's disease and is on Sinemet and Requip--with fairly good control of motor symptoms. Currently she remains in ICU intubated--on precedex now, offfentanyl; Levophed discontinued around 840 Saturday morning    This morning patient tracks examiner around bed, follows some simple commands no longer on fentanyl. EEG was done and pending interpretation although suspicion of seizures is much much lower now. There has been no seizure activity. MRI brain completed on 7/8 was negative for acute pathology    There is no family present at the bedside. There is no family present at bedside currently    Low-grade fevers continue otherwise vitals are stable on vent. Review of systems is limited due to her intubation and sedation.     Allergies   Allergen Reactions    Ciprofloxacin Nausea And Vomiting    Codeine Itching     Creepy crawly \" feelings     Medications:     Current Facility-Administered Medications   Medication Dose Route Frequency Provider Last Rate Last Admin    midodrine (PROAMATINE) tablet 2.5 mg  2.5 mg Orogastric TID PRN Rivka Gorman MD        potassium chloride 20 mEq/50 mL IVPB (Central Line)  20 mEq IntraVENous Once Rivka Gorman MD        norepinephrine (LEVOPHED) 16 mg in dextrose 5% 250 mL infusion  1-100 mcg/min IntraVENous Continuous Maddy Henson Cecille Moreno MD   Stopped at 07/09/22 2238    dexmedetomidine (PRECEDEX) 1,000 mcg in sodium chloride 0.9 % 250 mL infusion  0.1-1.5 mcg/kg/hr IntraVENous Continuous Dulce Melendrez MD 26.45 mL/hr at 07/10/22 0635 1 mcg/kg/hr at 07/10/22 0635    midazolam PF (VERSED) injection 0.5 mg  0.5 mg IntraVENous Q2H PRN Dulce Melendrez MD   0.5 mg at 07/10/22 0055    famotidine (PEPCID) 10 mg in sodium chloride (PF) 10 mL injection  10 mg IntraVENous Daily Dulce Melendrez MD   10 mg at 07/09/22 4566    0.9 % sodium chloride infusion   IntraVENous PRN Dulce Melendrez MD        budesonide (PULMICORT) nebulizer suspension 500 mcg  0.5 mg Nebulization BID Dulce Melendrez MD   500 mcg at 07/10/22 0847    levalbuterol (XOPENEX) nebulization 0.63 mg  0.63 mg Nebulization Q4H PRN Dulce Melendrez MD        metoprolol tartrate (LOPRESSOR) tablet 25 mg  25 mg Per NG tube BID Bassam Escobar MD   25 mg at 07/09/22 2022    insulin lispro (HUMALOG) injection vial 0-6 Units  0-6 Units SubCUTAneous Q4H Dulce Melendrez MD   1 Units at 07/10/22 0055    insulin glargine-yfgn (SEMGLEE-YFGN) injection vial 5 Units  5 Units SubCUTAneous Nightly Dulce Melendrez MD   5 Units at 07/09/22 2023    fentaNYL 10 mcg/ml in 0.9%  ml infusion   mcg/hr IntraVENous Continuous Khadijah Juarez MD   Stopped at 07/09/22 1224    chlorhexidine (PERIDEX) 0.12 % solution 15 mL  15 mL Mouth/Throat BID Kristi Blum MD   15 mL at 07/09/22 2021    ipratropium-albuterol (DUONEB) nebulizer solution 1 ampule  1 ampule Inhalation Q4H WA Khadijah Juarez MD   1 ampule at 07/10/22 0847    aspirin chewable tablet 81 mg  81 mg Oral Daily Bassam Escobar MD   81 mg at 07/07/22 3065    polyethylene glycol (GLYCOLAX) packet 17 g  17 g Oral BID Bassam Escobar MD   17 g at 07/09/22 8903    amiodarone (CORDARONE) tablet 200 mg  200 mg Oral BID Marjorie Salazar MD   200 mg at 07/09/22 2021    [START ON 7/14/2022] amiodarone (CORDARONE) tablet 200 mg  200 mg Oral Daily Brandee Belle MD        apixaban [place]) tablet 5 mg  5 mg Oral BID Tad Orozco MD   5 mg at 07/09/22 2021    atorvastatin (LIPITOR) tablet 20 mg  20 mg Oral Daily Regulo Doty MD   20 mg at 07/09/22 3216    sodium chloride flush 0.9 % injection 5-40 mL  5-40 mL IntraVENous 2 times per day Regulo Doty MD   10 mL at 07/09/22 2021    sodium chloride flush 0.9 % injection 5-40 mL  5-40 mL IntraVENous PRN Regulo Doty MD   10 mL at 07/09/22 1819    0.9 % sodium chloride infusion   IntraVENous PRN Regulo Doty MD        acetaminophen (TYLENOL) tablet 650 mg  650 mg Oral Q6H PRN Regulo Doty MD   650 mg at 07/09/22 1835    Or    acetaminophen (TYLENOL) suppository 650 mg  650 mg Rectal Q6H PRN Regulo Doty MD        piperacillin-tazobactam (ZOSYN) 4,500 mg in dextrose 5 % 100 mL IVPB ([id number])  4,500 mg IntraVENous Q12H Regulo Doty MD   Stopped at 07/10/22 3055    glucose chewable tablet 16 g  4 tablet Oral PRN Regulo Doty MD        dextrose bolus 10% 125 mL  125 mL IntraVENous PRN Regulo Doty MD        Or    dextrose bolus 10% 250 mL  250 mL IntraVENous PRN Regulo Doty MD        glucagon (rDNA) injection 1 mg  1 mg IntraMUSCular PRN Regulo Doty MD        dextrose 5 % solution  100 mL/hr IntraVENous PRN Regulo Doty MD         Objective:     /63   Pulse 73   Temp 99.3 °F (37.4 °C) (Bladder)   Resp 15   Ht 5' (1.524 m)   Wt 236 lb 11.2 oz (107.4 kg)   SpO2 100%   BMI 46.23 kg/m²     General appearance: Awake, opens eyes,, intubated, no obvious distress  Head: Normocephalic, atraumatic--ETT in place  Eyes: Sclera clear  Lungs: Breathing over ventilator  Heart: Tachycardic rate and rhythm-- NSR -ST  Extremities: Trace edema at ankles; bilateral soft wrist restraints  Skin: Incision site right foot    Mental Status: Intubated, opens eyes to command, tracks examiner, follows simple commands    Cranial Nerves:  I: smell    II: visual acuity     II: visual fields  bilateral threat   II: pupils  miotic but ERRLA   III,VII: ptosis  none   III,IV,VI: extraocular muscles   pupils are midline today   V: mastication    V: facial light touch sensation   appears normal   V,VII: corneal reflex     VII: facial muscle function - upper   appears normal   VII: facial muscle function - lower  appears symmetric   VIII: hearing  appears normal   IX: soft palate elevation     IX,X: gag reflex    XI: trapezius strength     XI: sternocleidomastoid strength    XI: neck extension strength   turns neck   XII: tongue strength   normal     Motor/sensory:  Bilateral wrist restraints are in place  Squeezes my hands bilaterally and wiggles toes  Normal bulk and tone  No abnormal movements; no tremors noted today  Appreciates light touch throughout    DTR:  No reflexes    No Babinski's  No Kohler's    Laboratory/Radiology:  ry/Radiology:     Lab Results   Component Value Date    WBC 2.6 (L) 07/10/2022    HGB 7.0 (L) 07/10/2022    HCT 22.6 (L) 07/10/2022    MCV 84.0 07/10/2022    PLT 15 (LL) 07/10/2022    LYMPHOPCT 28.6 07/10/2022    RBC 2.69 (L) 07/10/2022    MCH 26.0 07/10/2022    MCHC 31.0 (L) 07/10/2022    RDW 15.5 (H) 07/10/2022     Lab Results   Component Value Date     (H) 07/10/2022    K 3.9 07/10/2022     (H) 07/10/2022    CO2 27 07/10/2022    BUN 78 (H) 07/10/2022    CREATININE 1.8 (H) 07/10/2022    GLUCOSE 126 (H) 07/10/2022    CALCIUM 8.0 (L) 07/10/2022    PROT 5.0 (L) 07/10/2022    LABALBU 3.1 (L) 07/10/2022    BILITOT 0.7 07/10/2022    ALKPHOS 54 07/10/2022    AST 12 07/10/2022    ALT 8 07/10/2022    LABGLOM 28 07/10/2022    GFRAA 33 07/10/2022     CTH head 7/5: no acute abnormalities    MRI brain w/o 7/8:   No acute intracranial abnormality.   No gross epileptogenic lesion is identified     EEG pending    All labs and images personally reviewed today    Assessment:     Seizure-like activity: In the setting of respiratory failure, septic shock, and uremia. . There was no description of the seizure-like event in question, but she displayed resting tremors of both legs earlier in the week which is consistent with her Parkinson's. MRI brain completed showed no acute intracranial pathology. EEG report is pending although no reports of seizure activity   There is less suspicion for epileptic events at this time    Acute encephalopathy   Multifactorial due to shock, digoxin toxicity, uremia, hyperammonemia, recent change in medications other metabolic derangements    A. fib with RVR   Eliquis currently on hold due to recent thoracentesis; beta-blocker and amiodarone per cardiology    Her exam is skewed due to recent sedation due to agitation. There has been no seizure activity off AEDs. Plan:     Continue supportive care  Wean sedation and vent as able  Continue to treat other medical conditions  Await EEG results  Continue to monitor off AEDs  May restart Sinemet post extubation pending on mental status and clinical course  Continue aspirin and statin as well as Eliquis once able  Could use Depakote for agitation if needed  Seizure precautions    Neurology will sign off. Please call with additional questions or concerns. Plan to follow-up with Iraida Fitch as outpatient for continued management.     RASHARD Saleh  7/10/2022

## 2022-07-10 NOTE — PLAN OF CARE
positive airway pressure, respiratory therapy assess nares and determine need for appliance change or resting period. 7/10/2022 0212 by Vincenzo Hopkins RN  Outcome: Not Progressing     Problem: ABCDS Injury Assessment  Goal: Absence of physical injury  7/10/2022 0923 by Precious Mohs, RN  Outcome: Progressing  7/10/2022 0212 by Vincenzo Hopkins RN  Outcome: Progressing     Problem: Safety - Medical Restraint  Goal: Remains free of injury from restraints (Restraint for Interference with Medical Device)  Description: INTERVENTIONS:  1. Determine that other, less restrictive measures have been tried or would not be effective before applying the restraint  2. Evaluate the patient's condition at the time of restraint application  3. Inform patient/family regarding the reason for restraint  4.  Q2H: Monitor safety, psychosocial status, comfort, nutrition and hydration  7/10/2022 0923 by Precious Mohs, RN  Outcome: Progressing  Flowsheets  Taken 7/10/2022 0900  Remains free of injury from restraints (restraint for interference with medical device):   Determine that other, less restrictive measures have been tried or would not be effective before applying the restraint   Evaluate the patient's condition at the time of restraint application   Inform patient/family regarding the reason for restraint   Every 2 hours: Monitor safety, psychosocial status, comfort, nutrition and hydration  Taken 7/10/2022 0800  Remains free of injury from restraints (restraint for interference with medical device): Determine that other, less restrictive measures have been tried or would not be effective before applying the restraint  Taken 7/10/2022 0738  Remains free of injury from restraints (restraint for interference with medical device):   Determine that other, less restrictive measures have been tried or would not be effective before applying the restraint   Evaluate the patient's condition at the time of restraint application Inform patient/family regarding the reason for restraint   Every 2 hours: Monitor safety, psychosocial status, comfort, nutrition and hydration  7/10/2022 0212 by Andrzej Gao RN  Outcome: Progressing     Problem: Safety - Adult  Goal: Free from fall injury  7/10/2022 0923 by Fidel Bynum RN  Outcome: Progressing  7/10/2022 0212 by Andrzej Gao RN  Outcome: Progressing     Problem: Respiratory - Adult  Goal: Absence of hypoxia  Description: Absence of hypoxia  7/10/2022 0432 by Darinel Chris RCP  Outcome: Progressing     Problem: Nutrition Deficit:  Goal: Optimize nutritional status  7/10/2022 0212 by Andrzej Gao RN  Outcome: Not Progressing

## 2022-07-10 NOTE — PROGRESS NOTES
Inpatient Cardiology Progress note     PATIENT IS BEING FOLLOWED FOR: Afib with RVR and for h/o HFpEF    Galindo Schumacher is a 66 y.o. female known to me from prior hospitalizations     SUBJECTIVE: Intubated, on vent, sedated and unresponsive  OBJECTIVE: Intubated, on vent, sedated and unresponsive. ROS:  Cannot be obtained    PHYSICAL EXAM:   /88   Pulse 77   Temp 99.2 °F (37.3 °C)   Resp 14   Ht 5' (1.524 m)   Wt 236 lb 11.2 oz (107.4 kg)   SpO2 98%   BMI 46.23 kg/m²    B/P Range last 24 hours: Systolic (16YRM), FDF:084 , Min:113 , NR    Diastolic (39UAA), UFF:71, Min:54, Max:88    CONST: Well developed, well nourished morbidly obese female who appears of stated age. Orally ntubated, on vent, sedated and unresponsive  HEENT:   Head- Normocephalic, atraumatic   Eyes- Conjunctivae pink, anicteric  Throat- Orally intubated. Right IJ TLC  Neck-  No stridor, trachea midline, no jugular venous distention. No carotid bruit  CHEST: Chest symmetrical and non-tender to palpation. No accessory muscle use or intercostal retractions. Right sided chest tube  RESPIRATORY:  Lung sounds - diminished in the lower lung fields. No crackles or wheezes heard   CARDIOVASCULAR:     Heart Inspection- shows no noted pulsations  Heart Palpation- no heaves or thrills; PMI is non-displaced   Heart Ausculation- Irregular rate and rhythm, no murmur. No s3 or rub   PV: No significant lower extremity edema. No varicosities. Pedal pulses palpable, no clubbing or cyanosis. Amputated 3rd and 4th toes bilaterally   ABDOMEN: Soft, non-tender to light palpation. Bowel sounds present. No palpable masses no organomegaly; no abdominal bruit  MS: No atrophy or abnormal movements.    : Deferred  SKIN: Warm and dry no statis dermatitis or ulcers   NEURO / PSYCH: sedated and unresponsive      Intake/Output Summary (Last 24 hours) at 7/10/2022 1205  Last data filed at 7/10/2022 1156  Gross per 24 hour   Intake 1027.37 ml   Output 3005 ml   Net -1977.63 ml       Weight:   Wt Readings from Last 3 Encounters:   07/10/22 236 lb 11.2 oz (107.4 kg)   07/06/22 230 lb (104.3 kg)   05/17/22 227 lb 1.6 oz (103 kg)     Current Inpatient Medications:   famotidine (PEPCID) injection  10 mg IntraVENous Daily    budesonide  0.5 mg Nebulization BID    metoprolol tartrate  25 mg Per NG tube BID    insulin lispro  0-6 Units SubCUTAneous Q4H    insulin glargine  5 Units SubCUTAneous Nightly    chlorhexidine  15 mL Mouth/Throat BID    ipratropium-albuterol  1 ampule Inhalation Q4H WA    [Held by provider] aspirin  81 mg Oral Daily    polyethylene glycol  17 g Oral BID    amiodarone  200 mg Oral BID    [START ON 7/14/2022] amiodarone  200 mg Oral Daily    [Held by provider] apixaban  5 mg Oral BID    atorvastatin  20 mg Oral Daily    sodium chloride flush  5-40 mL IntraVENous 2 times per day    piperacillin-tazobactam  4,500 mg IntraVENous Q12H       IV Infusions (if any):   dexmedetomidine (PRECEDEX) IV infusion 0.6 mcg/kg/hr (07/10/22 1030)    sodium chloride      fentaNYL Stopped (07/09/22 1224)    sodium chloride      dextrose         DIAGNOSTIC/ LABORATORY DATA:  Labs:   CBC:   Recent Labs     07/09/22  0523 07/09/22  0801 07/10/22  0438 07/10/22  0913   WBC 7.4  --  2.6*  --    HGB 7.6*   < > 7.0* 7.2*   HCT 23.7*   < > 22.6* 23.7*   PLT 43*  --  15*  --     < > = values in this interval not displayed.      BMP:   Recent Labs     07/09/22  1815 07/10/22  0438    148*   K 3.6 3.9   CO2 23 27   BUN 87* 78*   CREATININE 2.0* 1.8*   LABGLOM 24 28   CALCIUM 8.0* 8.0*     Mag:   Recent Labs     07/09/22  1815 07/10/22  0438   MG 2.0 2.0     Phos:   Recent Labs     07/09/22  1815 07/10/22  0438   PHOS 3.2 2.7     TFT:   Lab Results   Component Value Date    TSH 4.580 (H) 07/06/2022    W0EKFUT 8.7 07/11/2016    T4FREE 0.97 07/06/2022      HgA1c:   Lab Results   Component Value Date    LABA1C 6.5 (H) 05/12/2022     No results found for: EAG    BNP: No results for input(s): BNP in the last 72 hours. PT/INR:   Recent Labs     07/09/22  0523 07/10/22  0438   PROTIME 22.1* 33.4*   INR 2.0 3.0     APTT:  Recent Labs     07/09/22  0523 07/10/22  0438   APTT 29.2 31.6     FASTING LIPID PANEL:  Lab Results   Component Value Date/Time    CHOL 95 05/12/2022 01:44 AM    HDL 38 05/12/2022 01:44 AM    LDLCALC 38 05/12/2022 01:44 AM    TRIG 93 05/12/2022 01:44 AM     LIVER PROFILE:  Recent Labs     07/09/22  0523 07/10/22  0438   AST 12 12   ALT 7 8   LABALBU 3.0* 3.1*       12 lead EKG 7/6/22: Afib, rate 98. Low QRS complexes. Diffuse non specific ST changes    CXR 7/10/22:   Lines and tubes are stable with no pneumothorax on the right.  Some  improvement seen in the region of left lung base in the interval.      Echo 7/7/22 ( Dr. Jenny Wyman ):  Technically difficult study - limited visualization. Micro-bubble contrast injected to enhance left ventricular visualization. Normal left ventricular size and systolic function. Ejection fraction is visually estimated at 70-75%. Indeterminate diastolic function. No regional wall motion abnormalities seen. Mild left ventricular concentric hypertrophy noted. Moderately dilated right ventricle with reduced function. Biatrial dilation. Mild tricuspid regurgitation. RVSP is at least 60 mmHg. Prominent pericardial fat pad. No definitive evidence of pericardial effusion. Telemetry: Afib with CVR      ASSESSMENT:   1. AF with RVR, recurrent, now with controlled ventricular response. Known history of PAF s/p DCCV in 4/2022 and 5/2022. [address] with Eliquis, currently on hold for possible thoracocentesis  2. Hx of HFpEF. Suspect anasarca and pleural effusion contributed to by  SHANTANU on CKD and hypoalbuminemia. 3. Acute hypoxic respiratory failure, intubated on mechanical ventilation. 4. Right sided pleural effusion s/p chest tube insertion with ~ 1250 cc drained  5.  Severe pulmonary HTN on Echo 7/7/22 ( with moderately dilated RV with reduced RV function, mild TR and normal LVEF )  6. Reported acute mental status change on presentation and seizures. Currently sedated and non-responsive. 7. Hypotension, resolved. Hx of hypertension. 8. SHANTANU on top of CKD. BUN and Cr improving. 9. Chronic anemia with progressive drop in H&H to 7.1/22.4 --> received transfusion 7/8/22, H&H 7.2&23.7 today  10. Severe thrombocytopenia with progressive drop in platelet count --> 15 today  11. Hypoalbuminemia  12. Mild to Moderate CAD on [facility] 1/2022, clinically stable   13. SERENA, with h/o non compliance with CPAP  14. Asthma  15. History of tobacco abuse  16. Morbid obesity 44.92   17. Hx of HLD  18. T2DM, insulin requiring. 19. Hx of Gastric ulcers in 2013  20. Hx of right sided breast CA s/p lumpectomy and radiation therapy. 21. Parkinson's disease. 25. H/o Anxiety/Depression            PLAN:  1. Continue current management per ICU team +- other consultants. 2.   Fluid management per nephrology  3. Eliquis and aspirin on hold  4. Continue Lopressor and Amiodarone  5. Wean off vent as tolerated  6.    Cardiology will see PRN, please call if needed.         Electronically signed by Namita Martinez MD on 7/10/2022 at 12:05 PM

## 2022-07-10 NOTE — PROGRESS NOTES
[facility]   Department of Internal Medicine   Internal Medicine Residency  MICU Progress Note    Patient:  Arnoldo Galindo 66 y.o. female   MRN: [id number]       Date of Service: 7/10/2022    Allergy: Ciprofloxacin and Codeine    Subjective     Overnight, patient was weaned off fentanyl and precedex was continued. Levophed was weaned off. Patient was seen and examined this morning at bedside sedated on precedex and intubated. Patient opens eyes on name calling and sternal rub but falls back asleep immediately. Objective     TEMPERATURE:  Current - Temp: 99 °F (37.2 °C); Max - Temp  Av.7 °F (37.6 °C)  Min: 99 °F (37.2 °C)  Max: 101.1 °F (38.4 °C)  RESPIRATIONS RANGE: Resp  Avg: 15.1  Min: 14  Max: 22  PULSE RANGE: Pulse  Av.3  Min: 73  Max: 138  BLOOD PRESSURE RANGE:  Systolic (65AFG), MYX:578 , Min:113 , JULIA:647   ; Diastolic (18REE), RSY:76, Min:54, Max:88    PULSE OXIMETRY RANGE: SpO2  Av.2 %  Min: 92 %  Max: 100 %    I & O - 24hr:    Intake/Output Summary (Last 24 hours) at 7/10/2022 1548  Last data filed at 7/10/2022 1349  Gross per 24 hour   Intake 1027.37 ml   Output 2715 ml   Net -1687.63 ml     I/O last 3 completed shifts: In: 1407.2 [I.V.:949.3; NG/GT:120; IV Piggyback:337.9]  Out: 3710 [Urine:3050; Stool:250; Chest Tube:410] I/O this shift: In: [de-identified] [I.V.:20; NG/GT:60]  Out: 1065 [Urine:1065]   Weight change: 3 lb 7.5 oz (1.574 kg)    Physical Exam:  General Appearance:    Sedated, intubated   HEENT:    NC/AT, mucous membranes are moist   Neck:   Supple, no jugular venous distention. Resp:     +rhonchi on right upper lung field, No wheezes or crackles, no use of accessory muscles   Heart:    RRR, S1 and S2 normal, no murmur, rub or gallop.     Abdomen:     Soft, no guarding, non-distended with normal bowel sounds, no masses   Extremities:   Atraumatic, no cyanosis, +1 pitting edema b/l   Pulses:  Radial and pedal pulses are intact bilaterally   Neurologic: Sedated, pupils 2-3 mm equally round and reactive to light        Medications     Continuous Infusions:   dexmedetomidine (PRECEDEX) IV infusion 0.6 mcg/kg/hr (07/10/22 1030)    sodium chloride      fentaNYL Stopped (07/09/22 1224)    sodium chloride      dextrose       Scheduled Meds:   albumin human  25 g IntraVENous BID    furosemide  40 mg IntraVENous BID    famotidine (PEPCID) injection  10 mg IntraVENous Daily    budesonide  0.5 mg Nebulization BID    metoprolol tartrate  25 mg Per NG tube BID    insulin lispro  0-6 Units SubCUTAneous Q4H    insulin glargine  5 Units SubCUTAneous Nightly    chlorhexidine  15 mL Mouth/Throat BID    ipratropium-albuterol  1 ampule Inhalation Q4H WA    [Held by provider] aspirin  81 mg Oral Daily    polyethylene glycol  17 g Oral BID    amiodarone  200 mg Oral BID    [START ON 7/14/2022] amiodarone  200 mg Oral Daily    [Held by provider] apixaban  5 mg Oral BID    atorvastatin  20 mg Oral Daily    sodium chloride flush  5-40 mL IntraVENous 2 times per day    piperacillin-tazobactam  4,500 mg IntraVENous Q12H     PRN Meds: midodrine, midazolam, sodium chloride, levalbuterol, sodium chloride flush, sodium chloride, acetaminophen **OR** acetaminophen, glucose, dextrose bolus **OR** dextrose bolus, glucagon (rDNA), dextrose  Nutrition:   NG/OG tube TF type: Pulmocare/Nephro/Glucerna/Jevity        At rate: off    Labs and Imaging Studies     CBC:   Recent Labs     07/09/22  0030 07/09/22  0030 07/09/22  0523 07/09/22  0523 07/09/22  0801 07/10/22  0438 07/10/22  0913   WBC 8.3  --  7.4  --   --  2.6*  --    HGB 7.4*   < > 7.6*  --   --  7.0* 7.2*   HCT 23.8*   < > 23.7*   < > 26.0* 22.6* 23.7*   MCV 84.4  --  83.5  --   --  84.0  --    PLT 46*  --  43*  --   --  15*  --     < > = values in this interval not displayed.        BMP:    Recent Labs     07/09/22  0523 07/09/22  1815 07/10/22  0438    143 148*   K 3.6 3.6 3.9    104 108*   CO2 23 23 27 BUN 99* 87* 78*   CREATININE 2.3* 2.0* 1.8*   GLUCOSE 126* 139* 126*       LIVER PROFILE:   Recent Labs     07/08/22  0435 07/09/22  0523 07/10/22  0438   AST 14 12 12   ALT 7 7 8   BILITOT 0.4 0.5 0.7   ALKPHOS 55 49 54       PT/INR:   Recent Labs     07/08/22  0435 07/09/22  0523 07/10/22  0438   PROTIME 19.2* 22.1* 33.4*   INR 1.7 2.0 3.0       APTT:   Recent Labs     07/08/22  0435 07/09/22  0523 07/10/22  0438   APTT 30.2 29.2 31.6       Fasting Lipid Panel:    Lab Results   Component Value Date/Time    CHOL 95 05/12/2022 01:44 AM    TRIG 93 05/12/2022 01:44 AM    HDL 38 05/12/2022 01:44 AM       Cardiac Enzymes:    Lab Results   Component Value Date    CKTOTAL 32 04/25/2014    CKTOTAL 34 04/25/2014    CKTOTAL 51 07/20/2013    CKMB 2.1 04/25/2014    CKMB 2.4 04/25/2014    CKMB 1.3 07/20/2013    TROPONINI <0.01 10/26/2020    TROPONINI <0.01 08/24/2019    TROPONINI <0.01 05/29/2019       Notable Cultures:      Blood cultures   Blood Culture, Routine   Date Value Ref Range Status   07/05/2022 5 Days no growth  Final     Respiratory cultures No results found for: RESPCULTURE   Gram Stain Result   Date Value Ref Range Status   07/08/2022 Refer to ordered Gram stain for results  Final     Urine   Creatinine Ur POCT   Date Value Ref Range Status   06/17/2019 50 mg/dl  Final     Urine Culture, Routine   Date Value Ref Range Status   07/05/2022 Growth not present  Final     Legionella No results found for: LABLEGI  C Diff PCR No results found for: CDIFPCR  Wound culture/abscess: No results for input(s): WNDABS in the last 72 hours. Tip culture:No results for input(s): CXCATHTIP in the last 72 hours.       Oxygen:     Vent Information  Ventilator ID: [id number]  Vent Mode: AC/VC  Additional Respiratory Assessments  Heart Rate: (!) 110  Resp: 16  SpO2: 100 %  Position: Semi-Bob's  Humidification Source: Heated wire  Humidification Temp: 37  Circuit Condensation: Drained  Cuff Pressure (cm H2O): 29 cm H2O       Urinary Catheter Wclza-Tulcfjsmxtd-Vrhrub (mL): 120 mL    Imaging Studies:  XR CHEST PORTABLE   Final Result   Lines and tubes are stable with no pneumothorax on the right. Some   improvement seen in the region of left lung base in the interval.         XR CHEST PORTABLE   Final Result   The evaluation is limited due to low lung volumes. No interval change compared to prior exam.      Lines and tubes are in unchanged position. XR ABDOMEN FOR NG/OG/NE TUBE PLACEMENT   Final Result   Catheter is in the stomach. XR CHEST PORTABLE   Final Result   Catheter placed with significant right lung aeration improvement and no   pneumothorax         MRI BRAIN WO CONTRAST   Final Result   1. No acute intracranial abnormality. 2. No gross epileptogenic lesion is identified. 3. Bilateral mastoid effusions, which may be due to eustachian tube   dysfunction or otomastoiditis. RECOMMENDATIONS:   Unavailable         CT GUIDED CHEST TUBE   Final Result   Successful uncomplicated  right chest tube placement      Recommendations:   1. Follow-up tube check in 2 weeks   2. Flush tube daily with 5 to 10 mL of sterile saline            XR CHEST PORTABLE   Final Result   1. There is no interval change in the bilateral perihilar and lower lobe   airspace disease more prominent within the right lung. 2. Moderate size right pleural effusion. 3. There is no pneumothorax. US RETROPERITONEAL COMPLETE   Final Result   1. Marked bilateral renal cortical thinning. Echogenic renal parenchyma. No hydronephrosis. 2.  A Diaz catheter appears to be decompressing the bladder. XR CHEST PORTABLE   Final Result   Increasing infiltrate throughout the right lung persistent left basilar   alveolar infiltrate is well. XR CHEST PORTABLE   Final Result   Adequately positioned right internal jugular central venous line. Otherwise,   no significant change compared to prior exam glued in lines and tubes. 6PM  7. Will try to extubated today, if unsuccessful, will resume TF and decrease tidal volume to 300; in successful will order bipap overnight  8. Changed schedule midodrine to PRN as MAP maintaining >65  9. Daily ABG and CXR while intubated  10. Ordered fibrinogen  11. Will keep pigtail chest drain and output in last 24H was 260 mL and still above goal      # Peptic ulcer prophylaxis: pepcid  # DVT Prophylaxis: eliquis on hold, PCD on left LE  # Disposition: Cont current care    Kanchan Harper MD, PGY-2    Attending Physician: Dr. Sonia Hernandez    I personally saw, examined and provided care for the patient. Radiographs, labs and medication list were reviewed by me independently. I spoke with bedside nursing, therapists and consultants. Critical care services and times documented are independent of procedures and multidisciplinary rounds with Residents. Additionally comprehensive, multidisciplinary rounds were conducted with the MICU team. The case was discussed in detail and plans for care were established. Review of Residents documentation was conducted and revisions were made as appropriate. I agree with the above documented exam, problem list and plan of care with the following additions:    Extubate today  Use nocturnal and prn NIV  Negative fluid balance  R chest tube continues to drain - remove when drainage is minimal  Will ask hematology to see for thrombocytopenia    35 minutes of CCT spent with the patient, reviewing the chart including imaging studies, and discussing the case with other health care professionals. This time excludes procedures.      Amparo Sims MD

## 2022-07-10 NOTE — FLOWSHEET NOTE
Pt. Is intermittently agitated. Reaches for ETT and TLC while unrestrained. Unable to follow commands or be redirected at this time. Restraints continued for pt. safety.

## 2022-07-11 ENCOUNTER — APPOINTMENT (OUTPATIENT)
Dept: GENERAL RADIOLOGY | Age: 72
DRG: 870 | End: 2022-07-11
Payer: MEDICARE

## 2022-07-11 LAB
AADO2: 95.6 MMHG
ALBUMIN SERPL-MCNC: 3.9 G/DL (ref 3.5–5.2)
ALP BLD-CCNC: 53 U/L (ref 35–104)
ALT SERPL-CCNC: 7 U/L (ref 0–32)
ANION GAP SERPL CALCULATED.3IONS-SCNC: 10 MMOL/L (ref 7–16)
ANION GAP SERPL CALCULATED.3IONS-SCNC: 11 MMOL/L (ref 7–16)
ANISOCYTOSIS: ABNORMAL
APTT: 30.6 SEC (ref 24.5–35.1)
AST SERPL-CCNC: 10 U/L (ref 0–31)
B.E.: 3.1 MMOL/L (ref -3–3)
BASOPHILS ABSOLUTE: 0 E9/L (ref 0–0.2)
BASOPHILS RELATIVE PERCENT: 0 % (ref 0–2)
BILIRUB SERPL-MCNC: 1.1 MG/DL (ref 0–1.2)
BODY FLUID CULTURE, STERILE: NORMAL
BUN BLDV-MCNC: 45 MG/DL (ref 6–23)
BUN BLDV-MCNC: 54 MG/DL (ref 6–23)
CALCIUM SERPL-MCNC: 8.7 MG/DL (ref 8.6–10.2)
CALCIUM SERPL-MCNC: 8.7 MG/DL (ref 8.6–10.2)
CHLORIDE BLD-SCNC: 107 MMOL/L (ref 98–107)
CHLORIDE BLD-SCNC: 108 MMOL/L (ref 98–107)
CO2: 29 MMOL/L (ref 22–29)
CO2: 31 MMOL/L (ref 22–29)
COHB: 0.8 % (ref 0–1.5)
CREAT SERPL-MCNC: 1.2 MG/DL (ref 0.5–1)
CREAT SERPL-MCNC: 1.3 MG/DL (ref 0.5–1)
CRITICAL: ABNORMAL
DATE ANALYZED: ABNORMAL
DATE OF COLLECTION: ABNORMAL
EOSINOPHILS ABSOLUTE: 0 E9/L (ref 0.05–0.5)
EOSINOPHILS RELATIVE PERCENT: 0.3 % (ref 0–6)
FIO2: 40 %
GFR AFRICAN AMERICAN: 49
GFR AFRICAN AMERICAN: 53
GFR NON-AFRICAN AMERICAN: 40 ML/MIN/1.73
GFR NON-AFRICAN AMERICAN: 44 ML/MIN/1.73
GLUCOSE BLD-MCNC: 166 MG/DL (ref 74–99)
GLUCOSE BLD-MCNC: 181 MG/DL (ref 74–99)
GRAM STAIN RESULT: NORMAL
HCO3: 27.3 MMOL/L (ref 22–26)
HCT VFR BLD CALC: 25.1 % (ref 34–48)
HEMOGLOBIN: 7.8 G/DL (ref 11.5–15.5)
HHB: 1.7 % (ref 0–5)
HYPOCHROMIA: ABNORMAL
INR BLD: 3.2
LAB: ABNORMAL
LYMPHOCYTES ABSOLUTE: 0.7 E9/L (ref 1.5–4)
LYMPHOCYTES RELATIVE PERCENT: 20 % (ref 20–42)
Lab: ABNORMAL
MAGNESIUM: 1.8 MG/DL (ref 1.6–2.6)
MAGNESIUM: 2 MG/DL (ref 1.6–2.6)
MCH RBC QN AUTO: 26.9 PG (ref 26–35)
MCHC RBC AUTO-ENTMCNC: 31.1 % (ref 32–34.5)
MCV RBC AUTO: 86.6 FL (ref 80–99.9)
METER GLUCOSE: 161 MG/DL (ref 74–99)
METER GLUCOSE: 164 MG/DL (ref 74–99)
METER GLUCOSE: 173 MG/DL (ref 74–99)
METER GLUCOSE: 178 MG/DL (ref 74–99)
METER GLUCOSE: 189 MG/DL (ref 74–99)
METER GLUCOSE: 226 MG/DL (ref 74–99)
METHB: 0.3 % (ref 0–1.5)
MODE: ABNORMAL
MONOCYTES ABSOLUTE: 0.07 E9/L (ref 0.1–0.95)
MONOCYTES RELATIVE PERCENT: 1.7 % (ref 2–12)
NEUTROPHILS ABSOLUTE: 2.73 E9/L (ref 1.8–7.3)
NEUTROPHILS RELATIVE PERCENT: 78.3 % (ref 43–80)
O2 SATURATION: 98.8 % (ref 92–98.5)
O2HB: 97.2 % (ref 94–97)
OPERATOR ID: ABNORMAL
OVALOCYTES: ABNORMAL
PATIENT TEMP: 37 C
PCO2: 40 MMHG (ref 35–45)
PDW BLD-RTO: 14.8 FL (ref 11.5–15)
PEEP/CPAP: 8 CMH2O
PFO2: 3.34 MMHG/%
PH BLOOD GAS: 7.45 (ref 7.35–7.45)
PHOSPHORUS: 2.1 MG/DL (ref 2.5–4.5)
PHOSPHORUS: 2.5 MG/DL (ref 2.5–4.5)
PLATELET # BLD: 13 E9/L (ref 130–450)
PLATELET CONFIRMATION: NORMAL
PMV BLD AUTO: 11.4 FL (ref 7–12)
PO2: 133.6 MMHG (ref 75–100)
POIKILOCYTES: ABNORMAL
POLYCHROMASIA: ABNORMAL
POTASSIUM SERPL-SCNC: 3.7 MMOL/L (ref 3.5–5)
POTASSIUM SERPL-SCNC: 3.8 MMOL/L (ref 3.5–5)
PROTHROMBIN TIME: 35 SEC (ref 9.3–12.4)
RBC # BLD: 2.9 E12/L (ref 3.5–5.5)
RI(T): 0.72
RR MECHANICAL: 18 B/MIN
SODIUM BLD-SCNC: 147 MMOL/L (ref 132–146)
SODIUM BLD-SCNC: 149 MMOL/L (ref 132–146)
SOURCE, BLOOD GAS: ABNORMAL
THB: 8.7 G/DL (ref 11.5–16.5)
TIME ANALYZED: 507
TOTAL PROTEIN: 5.6 G/DL (ref 6.4–8.3)
VT MECHANICAL: 400 ML
WBC # BLD: 3.5 E9/L (ref 4.5–11.5)

## 2022-07-11 PROCEDURE — 2580000003 HC RX 258: Performed by: INTERNAL MEDICINE

## 2022-07-11 PROCEDURE — 6360000002 HC RX W HCPCS: Performed by: STUDENT IN AN ORGANIZED HEALTH CARE EDUCATION/TRAINING PROGRAM

## 2022-07-11 PROCEDURE — 92610 EVALUATE SWALLOWING FUNCTION: CPT

## 2022-07-11 PROCEDURE — 6370000000 HC RX 637 (ALT 250 FOR IP)

## 2022-07-11 PROCEDURE — 80048 BASIC METABOLIC PNL TOTAL CA: CPT

## 2022-07-11 PROCEDURE — 6370000000 HC RX 637 (ALT 250 FOR IP): Performed by: INTERNAL MEDICINE

## 2022-07-11 PROCEDURE — 99232 SBSQ HOSP IP/OBS MODERATE 35: CPT | Performed by: INTERNAL MEDICINE

## 2022-07-11 PROCEDURE — S5553 INSULIN LONG ACTING 5 U: HCPCS | Performed by: INTERNAL MEDICINE

## 2022-07-11 PROCEDURE — 2000000000 HC ICU R&B

## 2022-07-11 PROCEDURE — 82805 BLOOD GASES W/O2 SATURATION: CPT

## 2022-07-11 PROCEDURE — 6360000002 HC RX W HCPCS: Performed by: INTERNAL MEDICINE

## 2022-07-11 PROCEDURE — 85730 THROMBOPLASTIN TIME PARTIAL: CPT

## 2022-07-11 PROCEDURE — 2500000003 HC RX 250 WO HCPCS: Performed by: INTERNAL MEDICINE

## 2022-07-11 PROCEDURE — 83735 ASSAY OF MAGNESIUM: CPT

## 2022-07-11 PROCEDURE — 2500000003 HC RX 250 WO HCPCS: Performed by: STUDENT IN AN ORGANIZED HEALTH CARE EDUCATION/TRAINING PROGRAM

## 2022-07-11 PROCEDURE — 92526 ORAL FUNCTION THERAPY: CPT

## 2022-07-11 PROCEDURE — P9047 ALBUMIN (HUMAN), 25%, 50ML: HCPCS | Performed by: INTERNAL MEDICINE

## 2022-07-11 PROCEDURE — 84100 ASSAY OF PHOSPHORUS: CPT

## 2022-07-11 PROCEDURE — 85025 COMPLETE CBC W/AUTO DIFF WBC: CPT

## 2022-07-11 PROCEDURE — 2580000003 HC RX 258

## 2022-07-11 PROCEDURE — 6360000002 HC RX W HCPCS

## 2022-07-11 PROCEDURE — 94660 CPAP INITIATION&MGMT: CPT

## 2022-07-11 PROCEDURE — 2580000003 HC RX 258: Performed by: STUDENT IN AN ORGANIZED HEALTH CARE EDUCATION/TRAINING PROGRAM

## 2022-07-11 PROCEDURE — 85610 PROTHROMBIN TIME: CPT

## 2022-07-11 PROCEDURE — 80053 COMPREHEN METABOLIC PANEL: CPT

## 2022-07-11 PROCEDURE — 94640 AIRWAY INHALATION TREATMENT: CPT

## 2022-07-11 PROCEDURE — 71045 X-RAY EXAM CHEST 1 VIEW: CPT

## 2022-07-11 PROCEDURE — 36415 COLL VENOUS BLD VENIPUNCTURE: CPT

## 2022-07-11 PROCEDURE — 82962 GLUCOSE BLOOD TEST: CPT

## 2022-07-11 PROCEDURE — 2700000000 HC OXYGEN THERAPY PER DAY

## 2022-07-11 RX ORDER — DEXTROSE MONOHYDRATE 50 MG/ML
INJECTION, SOLUTION INTRAVENOUS CONTINUOUS
Status: DISCONTINUED | OUTPATIENT
Start: 2022-07-11 | End: 2022-07-11

## 2022-07-11 RX ORDER — PHYTONADIONE 5 MG/1
10 TABLET ORAL ONCE
Status: COMPLETED | OUTPATIENT
Start: 2022-07-11 | End: 2022-07-11

## 2022-07-11 RX ORDER — CARBIDOPA AND LEVODOPA 25; 100 MG/1; MG/1
2 TABLET, EXTENDED RELEASE ORAL 3 TIMES DAILY
Status: DISCONTINUED | OUTPATIENT
Start: 2022-07-11 | End: 2022-07-27 | Stop reason: HOSPADM

## 2022-07-11 RX ORDER — FAMOTIDINE 20 MG/1
10 TABLET, FILM COATED ORAL DAILY
Status: DISCONTINUED | OUTPATIENT
Start: 2022-07-11 | End: 2022-07-15

## 2022-07-11 RX ORDER — ONDANSETRON 2 MG/ML
4 INJECTION INTRAMUSCULAR; INTRAVENOUS EVERY 6 HOURS PRN
Status: DISCONTINUED | OUTPATIENT
Start: 2022-07-11 | End: 2022-07-27 | Stop reason: HOSPADM

## 2022-07-11 RX ORDER — INSULIN LISPRO 100 [IU]/ML
0-6 INJECTION, SOLUTION INTRAVENOUS; SUBCUTANEOUS
Status: DISCONTINUED | OUTPATIENT
Start: 2022-07-11 | End: 2022-07-27 | Stop reason: HOSPADM

## 2022-07-11 RX ORDER — HYDRALAZINE HYDROCHLORIDE 20 MG/ML
10 INJECTION INTRAMUSCULAR; INTRAVENOUS EVERY 6 HOURS PRN
Status: DISCONTINUED | OUTPATIENT
Start: 2022-07-11 | End: 2022-07-27 | Stop reason: HOSPADM

## 2022-07-11 RX ORDER — AMIODARONE HYDROCHLORIDE 200 MG/1
200 TABLET ORAL DAILY
Status: DISCONTINUED | OUTPATIENT
Start: 2022-07-11 | End: 2022-07-27 | Stop reason: HOSPADM

## 2022-07-11 RX ORDER — LABETALOL HYDROCHLORIDE 5 MG/ML
10 INJECTION, SOLUTION INTRAVENOUS EVERY 6 HOURS PRN
Status: DISCONTINUED | OUTPATIENT
Start: 2022-07-11 | End: 2022-07-27 | Stop reason: HOSPADM

## 2022-07-11 RX ADMIN — IPRATROPIUM BROMIDE AND ALBUTEROL SULFATE 1 AMPULE: .5; 2.5 SOLUTION RESPIRATORY (INHALATION) at 15:37

## 2022-07-11 RX ADMIN — ALBUMIN (HUMAN) 25 G: 0.25 INJECTION, SOLUTION INTRAVENOUS at 08:15

## 2022-07-11 RX ADMIN — IPRATROPIUM BROMIDE AND ALBUTEROL SULFATE 1 AMPULE: .5; 2.5 SOLUTION RESPIRATORY (INHALATION) at 11:44

## 2022-07-11 RX ADMIN — METOPROLOL TARTRATE 5 MG: 1 INJECTION, SOLUTION INTRAVENOUS at 02:50

## 2022-07-11 RX ADMIN — INSULIN LISPRO 1 UNITS: 100 INJECTION, SOLUTION INTRAVENOUS; SUBCUTANEOUS at 17:06

## 2022-07-11 RX ADMIN — PIPERACILLIN AND TAZOBACTAM 4500 MG: 4; .5 INJECTION, POWDER, FOR SOLUTION INTRAVENOUS at 04:46

## 2022-07-11 RX ADMIN — FUROSEMIDE 40 MG: 10 INJECTION, SOLUTION INTRAMUSCULAR; INTRAVENOUS at 08:09

## 2022-07-11 RX ADMIN — IPRATROPIUM BROMIDE AND ALBUTEROL SULFATE 1 AMPULE: .5; 2.5 SOLUTION RESPIRATORY (INHALATION) at 08:47

## 2022-07-11 RX ADMIN — PHYTONADIONE 10 MG: 5 TABLET ORAL at 16:36

## 2022-07-11 RX ADMIN — ACETAMINOPHEN 650 MG: 325 TABLET ORAL at 16:37

## 2022-07-11 RX ADMIN — SODIUM CHLORIDE, PRESERVATIVE FREE 10 ML: 5 INJECTION INTRAVENOUS at 08:10

## 2022-07-11 RX ADMIN — CEFEPIME 2000 MG: 2 INJECTION, POWDER, FOR SOLUTION INTRAVENOUS at 14:49

## 2022-07-11 RX ADMIN — AMIODARONE HYDROCHLORIDE 0.5 MG/MIN: 50 INJECTION, SOLUTION INTRAVENOUS at 03:19

## 2022-07-11 RX ADMIN — INSULIN LISPRO 1 UNITS: 100 INJECTION, SOLUTION INTRAVENOUS; SUBCUTANEOUS at 08:21

## 2022-07-11 RX ADMIN — INSULIN LISPRO 1 UNITS: 100 INJECTION, SOLUTION INTRAVENOUS; SUBCUTANEOUS at 04:49

## 2022-07-11 RX ADMIN — INSULIN LISPRO 2 UNITS: 100 INJECTION, SOLUTION INTRAVENOUS; SUBCUTANEOUS at 20:16

## 2022-07-11 RX ADMIN — LABETALOL HYDROCHLORIDE 10 MG: 5 INJECTION, SOLUTION INTRAVENOUS at 00:44

## 2022-07-11 RX ADMIN — METOPROLOL TARTRATE 25 MG: 25 TABLET, FILM COATED ORAL at 20:16

## 2022-07-11 RX ADMIN — SODIUM CHLORIDE, PRESERVATIVE FREE 10 MG: 5 INJECTION INTRAVENOUS at 08:09

## 2022-07-11 RX ADMIN — INSULIN GLARGINE-YFGN 5 UNITS: 100 INJECTION, SOLUTION SUBCUTANEOUS at 20:16

## 2022-07-11 RX ADMIN — ONDANSETRON 4 MG: 2 INJECTION INTRAMUSCULAR; INTRAVENOUS at 17:43

## 2022-07-11 RX ADMIN — BUDESONIDE 500 MCG: 0.5 SUSPENSION RESPIRATORY (INHALATION) at 08:47

## 2022-07-11 RX ADMIN — METOPROLOL TARTRATE 5 MG: 1 INJECTION, SOLUTION INTRAVENOUS at 08:09

## 2022-07-11 RX ADMIN — CHLORHEXIDINE GLUCONATE 15 ML: 1.2 RINSE ORAL at 08:10

## 2022-07-11 RX ADMIN — POLYETHYLENE GLYCOL 3350 17 G: 17 POWDER, FOR SOLUTION ORAL at 20:16

## 2022-07-11 RX ADMIN — BUDESONIDE 500 MCG: 0.5 SUSPENSION RESPIRATORY (INHALATION) at 20:35

## 2022-07-11 RX ADMIN — INSULIN LISPRO 1 UNITS: 100 INJECTION, SOLUTION INTRAVENOUS; SUBCUTANEOUS at 00:52

## 2022-07-11 RX ADMIN — CARBIDOPA AND LEVODOPA 2 TABLET: 25; 100 TABLET, EXTENDED RELEASE ORAL at 16:36

## 2022-07-11 RX ADMIN — AMIODARONE HYDROCHLORIDE 200 MG: 200 TABLET ORAL at 14:44

## 2022-07-11 RX ADMIN — SODIUM CHLORIDE 0.4 MCG/KG/HR: 9 INJECTION, SOLUTION INTRAVENOUS at 12:48

## 2022-07-11 RX ADMIN — CARBIDOPA AND LEVODOPA 2 TABLET: 25; 100 TABLET, EXTENDED RELEASE ORAL at 20:18

## 2022-07-11 RX ADMIN — INSULIN LISPRO 1 UNITS: 100 INJECTION, SOLUTION INTRAVENOUS; SUBCUTANEOUS at 12:52

## 2022-07-11 RX ADMIN — FUROSEMIDE 40 MG: 10 INJECTION, SOLUTION INTRAMUSCULAR; INTRAVENOUS at 17:43

## 2022-07-11 RX ADMIN — HYDRALAZINE HYDROCHLORIDE 10 MG: 20 INJECTION INTRAMUSCULAR; INTRAVENOUS at 04:42

## 2022-07-11 RX ADMIN — IPRATROPIUM BROMIDE AND ALBUTEROL SULFATE 1 AMPULE: .5; 2.5 SOLUTION RESPIRATORY (INHALATION) at 20:35

## 2022-07-11 RX ADMIN — ALBUMIN (HUMAN) 25 G: 0.25 INJECTION, SOLUTION INTRAVENOUS at 20:18

## 2022-07-11 RX ADMIN — AMIODARONE HYDROCHLORIDE 0.5 MG/MIN: 50 INJECTION, SOLUTION INTRAVENOUS at 04:41

## 2022-07-11 ASSESSMENT — PAIN SCALES - GENERAL
PAINLEVEL_OUTOF10: 0

## 2022-07-11 NOTE — PLAN OF CARE
Problem: Chronic Conditions and Co-morbidities  Goal: Patient's chronic conditions and co-morbidity symptoms are monitored and maintained or improved  Outcome: Progressing     Problem: Discharge Planning  Goal: Discharge to home or other facility with appropriate resources  Outcome: Progressing     Problem: Pain  Goal: Verbalizes/displays adequate comfort level or baseline comfort level  Outcome: Progressing     Problem: Skin/Tissue Integrity  Goal: Absence of new skin breakdown  Description: 1. Monitor for areas of redness and/or skin breakdown  2. Assess vascular access sites hourly  3. Every 4-6 hours minimum:  Change oxygen saturation probe site  4. Every 4-6 hours:  If on nasal continuous positive airway pressure, respiratory therapy assess nares and determine need for appliance change or resting period.   Outcome: Progressing     Problem: ABCDS Injury Assessment  Goal: Absence of physical injury  7/10/2022 2321 by Luz Rodriguez RN  Outcome: Progressing  7/10/2022 0923 by Opal Ford RN  Outcome: Progressing     Problem: Safety - Adult  Goal: Free from fall injury  7/10/2022 2321 by Luz Rodriguez RN  Outcome: Progressing  7/10/2022 0923 by Opal Ford RN  Outcome: Progressing     Problem: Nutrition Deficit:  Goal: Optimize nutritional status  Outcome: Progressing
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
## 2022-07-11 NOTE — PROGRESS NOTES
Date: 7/10/2022    Time: 11:37 PM    Patient Placed On BIPAP/CPAP/ Non-Invasive Ventilation? Yes    If no must comment. Facial area red/color change? No           If YES are Blister/Lesion present? No   If yes must notify nursing staff  BIPAP/CPAP skin barrier?   Yes    Skin barrier type:mepilexlite       Comments:        Selma Rehman RCP

## 2022-07-11 NOTE — PROGRESS NOTES
Comprehensive Nutrition Assessment    Type and Reason for Visit:  Reassess    Nutrition Recommendations/Plan:     Pt extubated yesterday pending swallow eval for appropriate diet. Please consult if nutrition recs are needed       Malnutrition Assessment:  Malnutrition Status: At risk for malnutrition (07/11/22 1301)    Context:  Acute Illness     Findings of the 6 clinical characteristics of malnutrition:  Energy Intake:  50% or less of estimated energy requirements for 5 or more days  Weight Loss:  Unable to assess (d/t wt fluctuation hx)     Body Fat Loss:  No significant body fat loss     Muscle Mass Loss:  No significant muscle mass loss    Fluid Accumulation:  No significant fluid accumulation     Strength:  Not Performed    Nutrition Assessment:    Pt now extubated to bipap however remains at risk d/t ongoing need for ICU care. Admit w/ Septic shock/ Acute encephalopathy (resolving). Noted PNA & Pleural effusion s/p CT placement. Noted SHANTANU. PMHx DM, CA, & Parkinson's. Pt NPO pending swallow eval- will monitor nutrition progression/provide recs as needed. Nutrition Related Findings:    Pt alert s/p extubation, MAP WNL, -I/O's 6L, +1/+3 edema, CT x 1, active BS Wound Type: Multiple,Open Wounds (toes x 2)       Current Nutrition Intake & Therapies:       Diet NPO    Anthropometric Measures:  Height: 5' (152.4 cm)  Ideal Body Weight (IBW): 100 lbs (45 kg)    Admission Body Weight: 234 lb (106.1 kg) (7/8 first measured)  Current Body Weight: 234 lb (106.1 kg) (7/8 adm wt as CBW slightly elevated), 231 % IBW.  Weight Source: Bed Scale  Current BMI (kg/m2): 45.7  Usual Body Weight:  (variable EMR measured wt ranges 219-244lb x 6 mon back)                 BMI Categories: Obese Class 3 (BMI 40.0 or greater)    Estimated Daily Nutrient Needs:  Energy Requirements Based On: Formula  Weight Used for Energy Requirements: Admission  Energy (kcal/day): MSJ 1492 x 1.2 SF= 7712-7302  Weight Used for Protein Requirements: Ideal  Protein (g/day): 1.8-2.2 g/kg IBW;   Fluid (ml/day): per critical care    Nutrition Diagnosis:   · Inadequate oral intake related to inadequate protein-energy intake (s/p extubation pending swallow eval) as evidenced by NPO or clear liquid status due to medical condition    Nutrition Interventions:   Nutrition Education/Counseling: Education not appropriate  Coordination of Nutrition Care: Continue to monitor while inpatient       Goals:  Previous Goal Met: Progressing toward Goal(s)  Specify Other Goals: Nutrition Progression- PO diet vs EN    Nutrition Monitoring and Evaluation:   Behavioral-Environmental Outcomes: None Identified  Food/Nutrient Intake Outcomes: Diet Advancement/Tolerance  Physical Signs/Symptoms Outcomes: Biochemical Data,Nutrition Focused Physical Findings,Skin,Weight,GI Status,Fluid Status or Edema,Hemodynamic Status    Discharge Planning:     Too soon to determine     Catrina Brennan RD, LD  Contact: Ext [phone]

## 2022-07-11 NOTE — PROGRESS NOTES
[address] [place] [address],[address]  Internal Medicine Residency Program  Progress Note - House Team     Patient:  Golden Flores 66 y.o. female MRN: [id number]     Date of Service: 7/11/2022     CC: AMS    Days since admission: 6 days    Subjective     Overnight events:   -Extubated to BiPAP. Will tried on nasal cannula.  -Hemoglobin was 6.9. Gave1 unit of pRBC. -Faint bedside swallow, switch oral meds to IV  -For systolic blood pressure up to 180,  Labetalol 10 mg given every 6 hourly as needed ordered, added hydralazine as well. -Improved SBP to  130-1 40    Patient is seen and examined this morning. She seems little bit of confused. She was not in respiratory distress. She was extubated yesterday. She wants to get up from bed, denies any chest dyspnea. Chest tube intact to water seal, no leakage of air. Objective     Physical Exam:  Vitals: /64   Pulse 87   Temp 100 °F (37.8 °C) (Bladder)   Resp 19   Ht 5' (1.524 m)   Wt 237 lb 4.8 oz (107.6 kg)   SpO2 97%   BMI 46.34 kg/m²     I & O - 24hr:   Intake/Output Summary (Last 24 hours) at 7/11/2022 1730  Last data filed at 7/11/2022 1200  Gross per 24 hour   Intake 801.68 ml   Output 3040 ml   Net -2238.32 ml      General Appearance: cooperative, mild distress and moderately obese  Neck: no adenopathy and no carotid bruit  Lung: Crackles and rhonchi in both lung fildes. Heart: regular rate and rhythm, S1, S2 normal, no murmur, click, rub or gallop  Abdomen: soft, non-tender; bowel sounds normal; no masses,  no organomegaly  Extremities:  +3 pitting edema both leg  Neurologic: Mental status: Seems confused.   Subject  Pertinent Information & Imaging Studies, Consults   egnesis  CBC:   Lab Results   Component Value Date/Time    WBC 3.5 07/11/2022 04:52 AM    RBC 2.90 07/11/2022 04:52 AM    HGB 7.8 07/11/2022 04:52 AM    HCT 25.1 07/11/2022 04:52 AM    MCV 86.6 07/11/2022 04:52 AM    MCH 26.9 07/11/2022 04:52 AM    [facility]    RDW 7/8/2022  Catheter is in the stomach. XR ABDOMEN FOR NG/OG/NE TUBE PLACEMENT    Result Date: 7/5/2022  Catheter is in the proximal stomach. US RETROPERITONEAL COMPLETE    Result Date: 7/7/2022  1. Marked bilateral renal cortical thinning. Echogenic renal parenchyma. No hydronephrosis. 2.  A Diaz catheter appears to be decompressing the bladder. MRI BRAIN WO CONTRAST    Result Date: 7/8/2022  1. No acute intracranial abnormality. 2. No gross epileptogenic lesion is identified. 3. Bilateral mastoid effusions, which may be due to eustachian tube dysfunction or otomastoiditis. RECOMMENDATIONS: Unavailable     US DUP LOWER EXTREMITIES BILATERAL VENOUS    Result Date: 7/6/2022  Within the visualized vessels there is no evidence for deep venous thrombosis         Notable Cultures:      Blood cultures   Blood Culture, Routine   Date Value Ref Range Status   07/05/2022 5 Days no growth  Final     Respiratory cultures No results found for: RESPCULTURE   Gram Stain Result   Date Value Ref Range Status   07/08/2022 Refer to ordered Gram stain for results  Final     Urine   Creatinine Ur POCT   Date Value Ref Range Status   06/17/2019 50 mg/dl  Final     Urine Culture, Routine   Date Value Ref Range Status   07/05/2022 Growth not present  Final     Legionella No results found for: LABLEGI  C Diff PCR No results found for: CDIFPCR  Wound culture/abscess: No results for input(s): WNDABS in the last 72 hours. Tip culture:No results for input(s): CXCATHTIP in the last 72 hours.      Antibiotic  Days  Day started   Cefepime (MAXIPIME) 2000 mg  0 7/11/22                                 DIET: Adult diet        Resident's Assessment and Plan     Domingo Branham is a 66 y.o. female with  has a past medical history of A-fib ([address] Acute on chronic congestive heart failure ([address] Anxiety, Asthma, CAD (coronary artery disease), Cancer ([address] Chronic kidney disease, Depression, Diabetes mellitus ([address] H/O mammogram, Hx MRSA infection, Hyperlipidemia, Hypertension, Lateral epicondylitis, SERENA on CPAP, Parkinson's disease ([facility] [address] and Tubal ligation status. came here with CC   Chief Complaint   Patient presents with    Altered Mental Status     per ems family reports ams x 45 minutes, recent psych med changes and right toe amputation        [id number] [facility]:     Days since admission: 6 days    Consults:  1. Cardiology:  -Continue current management by ICU team  -Fluid management by nephrology  -Day prior Levophed as tolerated  -Start amiodarone  -Cardiology will see as needed, please call if needed. 2. Nephrology:  -Continue hemodynamic support  -Continue current IV fluids  -Check ultrasonogram kidney  -Antibiotics per pharmacy dosing. Assessment:  Acute encephalopathy, multifactorial 2/2 septic shock, digoxin toxicity and uremia  - Patient is responsive, little confused  Plan:  -Monitor neuro status and and hemodynamics  2. Acute hypoxic hypercapnic respiratory failure 2/2 moderately large right pleural effusion with acute decompensated exacerbation  - Patient has crackles and rhonchi in both lung fields  -Patient is on nasal canula  Plan:  -CMP daily  -CXR daily  -Monitor ABG  --Continue piperacillin-Tazobactum    3. Pulmonary hypertension likely Type II 2/2 acute decompensated diastolic heart failure, in the setting of Hx SERENA  4. Shock, likely septic (HAP) less likely cardiogenic (small pericardial effusion on CT)  Plan:  -Continue piperacillin-Tazobactum  5. Bradycardia, hypotension 2/2 Digoxin toxicity in the setting of SHANTANU. Digoxin level 3.7>>2    Plan:  -Resolved  6. Acute on chronic macrocytic anemia, multifactorial, sepsis, blood draws, r/o hemolysis, no overt signs of bleeding. Hb 7.4 today, platelet 13  -no active bleeding  Plan:  -Holding aspirin and apixaban  -PT, INR, APTT daily  -consult heam-onc  7.  SHANTANU Stage III on CKD multifactorial, pre-renal (hemodynamically mediated, DHN 2/2 diuretic use, poor oral intake);   -Monitor renal status  7. Hx of Type 2 DM with neuropathy  -Lantus 10 and Novolog SS at home. DVT ppx: None  GI ppx: Famotidine      Next of Kin/ POA:  Daughter    Code Status:   Full          Kennedy Rosenthal MD, PGY-2  Attending physician: Dr. David Mcnamara       NOTE: This report was transcribed using voice recognition software. Every effort was made to ensure accuracy; however, inadvertent computerized transcription errors may be present.

## 2022-07-11 NOTE — PROGRESS NOTES
Hyperlipidemia     Hypertension     Lateral epicondylitis     SERENA on CPAP     Parkinson's disease ([facility] [address]     Tubal ligation status        Past Surgical History:   Procedure Laterality Date    BREAST LUMPECTOMY      BREAST REDUCTION SURGERY      CARDIAC CATHETERIZATION  4/28/2014    Dr. Ronni Nolan  01/11/2022    Dr. Tom Leavitt  7/29/15    CT GUIDED CHEST TUBE  7/8/2022    CT GUIDED CHEST TUBE 7/8/2022 Prince Franki MD [facility]    ENDOSCOPY, COLON, DIAGNOSTIC  7/19/15    GALLBLADDER SURGERY      LUMBAR LAMINECTOMY      TOE AMPUTATION      TONSILLECTOMY      UPPER GASTROINTESTINAL ENDOSCOPY         Family History   Problem Relation Age of Onset    Cancer Mother         Lung Ca    Cancer Father         lung Ca    Diabetes Sister     Heart Disease Sister     Seizures Son     Other Brother         sepsis        reports that she has never smoked. She has never used smokeless tobacco. She reports that she does not drink alcohol and does not use drugs.     Allergies:  Ciprofloxacin and Codeine    Current Medications:    labetalol (NORMODYNE;TRANDATE) injection 10 mg, Q6H PRN  hydrALAZINE (APRESOLINE) injection 10 mg, Q6H PRN  midodrine (PROAMATINE) tablet 2.5 mg, TID PRN  albumin human 25 % IV solution 25 g, BID  furosemide (LASIX) injection 40 mg, BID  0.9 % sodium chloride infusion, PRN  metoprolol (LOPRESSOR) injection 5 mg, Q6H  amiodarone (CORDARONE) 450 mg in dextrose 5 % 250 mL infusion, Continuous  dexmedetomidine (PRECEDEX) 1,000 mcg in sodium chloride 0.9 % 250 mL infusion, Continuous  midazolam PF (VERSED) injection 0.5 mg, Q2H PRN  famotidine (PEPCID) 10 mg in sodium chloride (PF) 10 mL injection, Daily  0.9 % sodium chloride infusion, PRN  budesonide (PULMICORT) nebulizer suspension 500 mcg, BID  levalbuterol (XOPENEX) nebulization 0.63 mg, Q4H PRN  [Held by provider] metoprolol tartrate (LOPRESSOR) tablet 25 mg, BID  insulin lispro (HUMALOG) injection vial 0-6 Units, Q4H  insulin glargine-yfgn (SEMGLEE-YFGN) injection vial 5 Units, Nightly  fentaNYL 10 mcg/ml in 0.9%  ml infusion, Continuous  chlorhexidine (PERIDEX) 0.12 % solution 15 mL, BID  ipratropium-albuterol (DUONEB) nebulizer solution 1 ampule, Q4H WA  [Held by provider] aspirin chewable tablet 81 mg, Daily  [Held by provider] polyethylene glycol (GLYCOLAX) packet 17 g, BID  [Held by provider] apixaban (ELIQUIS) tablet 5 mg, BID  [Held by provider] atorvastatin (LIPITOR) tablet 20 mg, Daily  sodium chloride flush 0.9 % injection 5-40 mL, 2 times per day  sodium chloride flush 0.9 % injection 5-40 mL, PRN  0.9 % sodium chloride infusion, PRN  acetaminophen (TYLENOL) tablet 650 mg, Q6H PRN   Or  acetaminophen (TYLENOL) suppository 650 mg, Q6H PRN  piperacillin-tazobactam (ZOSYN) 4,500 mg in dextrose 5 % 100 mL IVPB (Iinv0Fyk), Q12H  glucose chewable tablet 16 g, PRN  dextrose bolus 10% 125 mL, PRN   Or  dextrose bolus 10% 250 mL, PRN  glucagon (rDNA) injection 1 mg, PRN  dextrose 5 % solution, PRN        Review of Systems:   Unable to obtain due to clinical conditon . Physical exam:   Vital signs BP (!) 136/55   Pulse 87   Temp 99.9 °F (37.7 °C) (Bladder)   Resp 21   Ht 5' (1.524 m)   Wt 237 lb 4.8 oz (107.6 kg)   SpO2 96%   BMI 46.34 kg/m²   Gen : moderate distress  Head : at , nc   Neck : supple , no thyromegaly noted . Eyes : EOMI , PERRLA   CV : tachycardiac 1+ edema in LE   Lungs: good flow heard b/l   Abd : soft , NT , BS + , No Organomegaly appreciated . Skin : soft, dry . Neuro : CN  II-XII grossly intact , no focal neurologic deficit . Psych : cooperative .      Data:   Labs:  CBC with Differential:    Lab Results   Component Value Date/Time    WBC 3.5 07/11/2022 04:52 AM    RBC 2.90 07/11/2022 04:52 AM    HGB 7.8 07/11/2022 04:52 AM    HCT 25.1 07/11/2022 04:52 AM    PLT 13 07/11/2022 04:52 AM    MCV 86.6 07/11/2022 04:52 AM    MCH 26.9 07/11/2022 04:52 AM    MCHC 31.1 07/11/2022 04:52 AM    RDW 14.8 07/11/2022 04:52 AM    NRBC 0.0 03/15/2019 09:10 AM    SEGSPCT 74 08/20/2013 10:45 AM    LYMPHOPCT 20.0 07/11/2022 04:52 AM    MONOPCT 1.7 07/11/2022 04:52 AM    MYELOPCT 0.9 07/09/2022 12:30 AM    EOSPCT 1 06/16/2017 12:00 AM    BASOPCT 0.0 07/11/2022 04:52 AM    MONOSABS 0.07 07/11/2022 04:52 AM    LYMPHSABS 0.70 07/11/2022 04:52 AM    EOSABS 0.00 07/11/2022 04:52 AM    BASOSABS 0.00 07/11/2022 04:52 AM     CMP:    Lab Results   Component Value Date/Time     07/11/2022 04:52 AM    K 3.8 07/11/2022 04:52 AM    K 5.0 07/06/2022 02:41 AM     07/11/2022 04:52 AM    CO2 29 07/11/2022 04:52 AM    BUN 54 07/11/2022 04:52 AM    CREATININE 1.3 07/11/2022 04:52 AM    GFRAA 49 07/11/2022 04:52 AM    LABGLOM 40 07/11/2022 04:52 AM    GLUCOSE 181 07/11/2022 04:52 AM    PROT 5.6 07/11/2022 04:52 AM    LABALBU 3.9 07/11/2022 04:52 AM    CALCIUM 8.7 07/11/2022 04:52 AM    BILITOT 1.1 07/11/2022 04:52 AM    ALKPHOS 53 07/11/2022 04:52 AM    AST 10 07/11/2022 04:52 AM    ALT 7 07/11/2022 04:52 AM     Ionized Calcium:  No results found for: IONCA  Magnesium:    Lab Results   Component Value Date/Time    MG 2.0 07/11/2022 04:52 AM     Phosphorus:    Lab Results   Component Value Date/Time    PHOS 2.5 07/11/2022 04:52 AM     U/A:    Lab Results   Component Value Date/Time    COLORU Yellow 07/05/2022 05:43 PM    PHUR 5.5 07/05/2022 05:43 PM    WBCUA 1-3 07/05/2022 05:43 PM    RBCUA NONE 07/05/2022 05:43 PM    RBCUA NONE 03/25/2013 09:00 PM    YEAST RARE 10/27/2015 07:18 PM    BACTERIA FEW 07/05/2022 05:43 PM    CLARITYU Clear 07/05/2022 05:43 PM    SPECGRAV 1.025 07/05/2022 05:43 PM    LEUKOCYTESUR Negative 07/05/2022 05:43 PM    UROBILINOGEN 0.2 07/05/2022 05:43 PM    BILIRUBINUR Negative 07/05/2022 05:43 PM    BLOODU Negative 07/05/2022 05:43 PM    GLUCOSEU Negative 07/05/2022 05:43 PM     Microalbumen/Creatinine ratio:  No components found for: RUCREAT  Iron Saturation:  No components found for: PERCENTFE  TIBC:    Lab Results   Component Value Date/Time    TIBC 152 07/09/2022 08:01 AM     FERRITIN:    Lab Results   Component Value Date/Time    FERRITIN 243 07/09/2022 08:01 AM        Imaging:  XR CHEST PORTABLE   Final Result   1. Patchy right perihilar and left lower lobe airspace disease   2. Status post removal of the endotracheal tube and NG tube      3. There is no pneumothorax. XR CHEST PORTABLE   Final Result   Lines and tubes are stable with no pneumothorax on the right. Some   improvement seen in the region of left lung base in the interval.         XR CHEST PORTABLE   Final Result   The evaluation is limited due to low lung volumes. No interval change compared to prior exam.      Lines and tubes are in unchanged position. XR ABDOMEN FOR NG/OG/NE TUBE PLACEMENT   Final Result   Catheter is in the stomach. XR CHEST PORTABLE   Final Result   Catheter placed with significant right lung aeration improvement and no   pneumothorax         MRI BRAIN WO CONTRAST   Final Result   1. No acute intracranial abnormality. 2. No gross epileptogenic lesion is identified. 3. Bilateral mastoid effusions, which may be due to eustachian tube   dysfunction or otomastoiditis. RECOMMENDATIONS:   Unavailable         CT GUIDED CHEST TUBE   Final Result   Successful uncomplicated  right chest tube placement      Recommendations:   1. Follow-up tube check in 2 weeks   2. Flush tube daily with 5 to 10 mL of sterile saline            XR CHEST PORTABLE   Final Result   1. There is no interval change in the bilateral perihilar and lower lobe   airspace disease more prominent within the right lung. 2. Moderate size right pleural effusion. 3. There is no pneumothorax. US RETROPERITONEAL COMPLETE   Final Result   1. Marked bilateral renal cortical thinning. Echogenic renal parenchyma. No hydronephrosis.       2.  A Diaz catheter appears to be The pericardial effusion is new. Small volume right upper abdominal ascites along with anasarca. This is new. XR CHEST PORTABLE   Final Result   New opacity on the right which may relate to pleural effusion with adjacent   atelectasis and or infiltrate. Cardiomegaly and pulmonary vascular   congestion implying elevated left heart filling pressures. XR CHEST PORTABLE    (Results Pending)       Assessment    Acute kidney injury non oligouric:   Baseline cr 0.9  Etiology of SHANTANU due to decrease effective in setting of intravascular volume depletion as evident by her need for levophed and her urine lytes with high avidity for cl and Na .   Basically bland urine sediment which make GN/vasculitis unlikely   High BUN in part due to steroids   LE edema noted though she is still on levophed        Continue hemodynamic support  Guide Abx per pharmacy dosing    Continue lasix 40 mg iv bid along with SPA for another day          -Encephalopathy metabolic multifactorial : per ICU team     -Digoxin toxicity s/p digbind    -Shock R/O septic shock               Electronically signed by Jagruti Evans MD on 7/11/2022 at 1:15 PM

## 2022-07-11 NOTE — PROGRESS NOTES
SPEECH/LANGUAGE PATHOLOGY  CLINICAL ASSESSMENT OF SWALLOWING FUNCTION   and PLAN OF CARE    PATIENT NAME:  Lily Jarvis  (female)     MRN:  [id number]    :  1950  (66 y.o.)  STATUS:  Inpatient: Room 4422/4422-A    TODAY'S DATE:  2022  REFERRING PROVIDER:   Dave Sinha MD  SPECIFIC PROVIDER ORDER: SLP swallowing-dysphagia evaluation and treatment   Date of order:  2022  REASON FOR REFERRAL: Assess Swallow Function    EVALUATING THERAPIST: ONEIL Pappas                 RESULTS:    DYSPHAGIA DIAGNOSIS:   Clinical indicators of moderate oropharyngeal phase dysphagia       DIET RECOMMENDATIONS:  Minced and moist consistency solids (IDDSI level 5) with  thin liquids NO STRAW (IDDSI level 0), MEDICATION ADMINISTRATION and Administer medication crushed, as able, with pudding/applesauce     FEEDING RECOMMENDATIONS:     Assistance level:  Full assistance is needed during all oral intake      Compensatory strategies recommended: Small bites/sips, Alternate solids and liquids, Check for oral pocketing and No straw      Discussed recommendations with nursing and/or faxed report to referring provider: Yes    SPEECH THERAPY  PLAN OF CARE   The dysphagia POC is established based on physician order, dysphagia diagnosis and results of clinical assessment     Skilled SLP intervention for dysphagia management on acute care 3-5 x per week until goals met, pt plateaus in function and/or discharged from hospital    Conditions Requiring Skilled Therapeutic Intervention for dysphagia:    Patient is performing below functional baseline d/t  current acute condition, Multiple diagnoses, multiple medications, and increased dependency upon caregivers.     Specific dysphagia interventions to include:     Compensatory strategy training   Therapeutic exercises  Trials of upgraded diet/liquid     Specific instructions for next treatment:  ongoing PO analysis to upgrade diet and evaluate tolerance of current PO recommendation, initiate instruction of therapeutic exercises  and initiate instruction of compensatory strategies  Patient Treatment Goals:    Short Term Goals:  Pt will complete PO trials of upgraded diet textures with SLP only to determine the least restrictive PO diet to maintain adequate nutrition/hydration with no more than 1 overt s/s of pen/asp. Pt will complete oral motor strength/ coordination exercises to improve bolus prep/ control and mastication with  moderate verbal prompts . Pt will complete BOTR strength/ ROM exercises to reduce pharyngeal residuals and improve epiglottic inversion moderate verbal prompts  Pt will complete laryngeal strength/ ROM therapeutic exercises to improve airway protection for the least restrictive PO diet moderate verbal prompts  Pt will complete Shaker and/or Chin Tuck Against Resistance (CTAR) to increase UES relaxation diameter and increase anterior laryngeal excursion to reduce pharyngeal residuals and reduce risk of pen/asp with moderate verbal prompts. Pt will complete Effortful Swallow therapeutically to target increased oral and base of tongue pressure, increased pharyngeal constrictor contractions, and increased UES relaxation duration to reduce pharyngeal residue with moderate verbal prompts   Pt will complete Kaitlin Maneuver therapeutically to target increased pharyngeal constrictor contractions to reduce pharyngeal residue with moderate verbal prompts     Long Term Goals:   Pt will maintain adequate nutrition/hydration via PO intake of the least restrictive oral diet with implementation of safe swallow/ compensatory strategies and decrease signs/symptoms of aspiration to less than 1 x/day. Pt will improve oropharyngeal swallow function to ensure airway protection during PO intake to maintain adequate nutrition/hydration and decrease signs/symptoms of aspiration to less than 1 x/day.       Patient/family Goal:    To be able to eat/drink     Plan of care discussed with Patient   The Patient understand(s) the diagnosis, prognosis and plan of care     Rehabilitation Potential/Prognosis: fair                    ADMITTING DIAGNOSIS: Shock ([facility] [address] [R57.9]  Uremia [N19]  SHANTANU (acute kidney injury) ([facility] [address] [N17.9]  Poisoning by digoxin, accidental or unintentional, initial encounter [T46.0X1A]  Altered mental status, unspecified altered mental status type [R41.82]  Acute pancreatitis, unspecified complication status, unspecified pancreatitis type [K85.90]    VISIT DIAGNOSIS:   Visit Diagnoses       Codes    Uremia     N19    Poisoning by digoxin, accidental or unintentional, initial encounter     T46.0X1A    Altered mental status, unspecified altered mental status type     R41.82    Acute pancreatitis, unspecified complication status, unspecified pancreatitis type     K85.90           PATIENT REPORT/COMPLAINT: patient currently NPO pending results of this evaluation  RN cleared patient for participation in assessment     yes     PRIOR LEVEL OF SWALLOW FUNCTION:    PAST HISTORY OF DYSPHAGIA?: yes    Home diet: Diet information not available    Current Diet Order:  ADULT DIET; Dysphagia - Minced and Moist; 5 carb choices (75 gm/meal);  Low Fat/Low Chol/High Fiber/2 gm Na    PROCEDURE:  Consistencies Administered During the Evaluation   Liquids: thin liquid    Solids:  pureed foods and solid foods      Method of Intake:   cup, straw, spoon  Fed by clinician, Hand over hand assist      Position:   Seated, upright    CLINICAL ASSESSMENT:  Oral Stage:       Dentition:  edentulous  Decreased mastication due to:  poor/missing dentition  , decreased lingual control and disorganized chewing pattern  Decreased bolus formation resulting in suspected premature pharyngeal spillage  Oral residuals were noted :  throughout the oral cavity      Pharyngeal Stage:    Throat clearing present after presentation of thin liquid WITH STRAW  Immediate wet cough was noted after presentation of thin liquid WITH STRAW    Cognition:   Confusion noted  Very Emotional during evaluation    Oral Peripheral Examination   Generalized oral weakness    Current Respiratory Status     nasal cannula     Parameters of Speech Production  Respiration:  Adequate for speech production  Quality:   Harsh and Strained  Intensity: Quiet    Volitional Swallow: Not assessed    Volitional Cough:   Not assessed    Pain: No pain reported. EDUCATION:   The Speech Language Pathologist (SLP) completed education regarding results of evaluation and that intervention is warranted at this time. Learner: Patient  Education: Reviewed results and recommendations of this evaluation  Evaluation of Education:  [employer] understanding    This plan may be re-evaluated and revised as warranted. Evaluation Time includes thorough review of current medical information, gathering information on past medical history/social history and prior level of function, completion of standardized testing/informal observation of tasks, assessment of data and education on plan of care and goals. [x]The admitting diagnosis and active problem list, have been reviewed prior to initiation of this evaluation. INTERVENTION     Speech Pathologist (SLP) and SLP student completed education with the patient regarding type of swallowing impairment. Reviewed current solid/liquid consistency diet recommendations and discussed compensatory strategies to ensure safe PO intake. Reviewed the Effortful Swallow exercise with patient and encouraged to practice independently daily. Reviewed aspiration precautions. Encouraged patient to engage SLP in unstructured Q&A session relative to identified deficit areas; indicated understanding of all information provided via satisfactory verbal response. Will continue to follow.          ACTIVE PROBLEM LIST:   Patient Active Problem List   Diagnosis    Depression with anxiety    Osteoporosis    Asthma    Hyperlipidemia   

## 2022-07-11 NOTE — PROGRESS NOTES
[address] [facility]  Department of Internal Medicine   Internal Medicine Residency   MICU Progress Note    Patient:  Corene Landau 66 y.o. female  MRN: [id number]     Date of Service: 7/11/2022    Allergy: Ciprofloxacin and Codeine    Subjective     Patient is seen and examined this morning. She is lying on bed, not ion respiratory distress, currently on 2 LPM/NC. She was extubated yesterday and placed on AVAPS overnight. She still has cough, not able to expectorate sputum. She denies headache, lightheadedness, chest pain, dyspnea, abdominal pain, nausea, vomiting. She wants water but she failed her bedside swallow yesterday. Currently waiting for SLP eval. She also desires to get up and sit on a chair. Chest tube intact to water seal, had 250 cc output in 24 hrs. No air leak noted. 24h change: Extubated yesterday 7/10/22. Referred to Hema-Onc yesterday for thrombocytopenia.    ROS: not able to assess  Objective     VS: BP (!) 140/61   Pulse 97   Temp 99.9 °F (37.7 °C) (Bladder)   Resp 18   Ht 5' (1.524 m)   Wt 237 lb 4.8 oz (107.6 kg)   SpO2 99%   BMI 46.34 kg/m²   ABP (Arterial line BP): 145/68  ABP Mean (Arterial Line Mean): 94 mmHg    I & O - 24hr:     Intake/Output Summary (Last 24 hours) at 7/11/2022 0843  Last data filed at 7/11/2022 0800  Gross per 24 hour   Intake 801.68 ml   Output 3950 ml   Net -3148.32 ml       Physical Exam:  General Appearance:  Alert, awake and oriented, not in respiratory distress  Neck: no adenopathy, no carotid bruit, no JVD, supple, symmetrical, trachea midline and thyroid not enlarged, symmetric, no tenderness/mass/nodules  Lung: (+) crackles and rhonchi on B upper lung fields, no wheezes noted,  (+) chest tube R lung to water seal, no leak noted, dressing C/D/I  Heart: normal rate to tachycardic,  irregularly, irregular rhythm,no murmur, click, rub or gallop  Abdomen: soft, non-tender; bowel sounds normal; no masses,  no organomegaly  Extremities:  extremities 07/11/2022 04:52 AM    BUN 54 07/11/2022 04:52 AM    CREATININE 1.3 07/11/2022 04:52 AM    GFRAA 49 07/11/2022 04:52 AM    LABGLOM 40 07/11/2022 04:52 AM    GLUCOSE 181 07/11/2022 04:52 AM    PROT 5.6 07/11/2022 04:52 AM    LABALBU 3.9 07/11/2022 04:52 AM    CALCIUM 8.7 07/11/2022 04:52 AM    BILITOT 1.1 07/11/2022 04:52 AM    ALKPHOS 53 07/11/2022 04:52 AM    AST 10 07/11/2022 04:52 AM    ALT 7 07/11/2022 04:52 AM     PT/INR:    Lab Results   Component Value Date/Time    PROTIME 35.0 07/11/2022 04:52 AM    INR 3.2 07/11/2022 04:52 AM     PTT:    Lab Results   Component Value Date/Time    APTT 30.6 07/11/2022 04:52 AM   [APTT}  U/A:    Lab Results   Component Value Date/Time    COLORU Yellow 07/05/2022 05:43 PM    PROTEINU 30 07/05/2022 05:43 PM    PHUR 5.5 07/05/2022 05:43 PM    WBCUA 1-3 07/05/2022 05:43 PM    RBCUA NONE 07/05/2022 05:43 PM    RBCUA NONE 03/25/2013 09:00 PM    YEAST RARE 10/27/2015 07:18 PM    BACTERIA FEW 07/05/2022 05:43 PM    CLARITYU Clear 07/05/2022 05:43 PM    SPECGRAV 1.025 07/05/2022 05:43 PM    LEUKOCYTESUR Negative 07/05/2022 05:43 PM    UROBILINOGEN 0.2 07/05/2022 05:43 PM    BILIRUBINUR Negative 07/05/2022 05:43 PM    BLOODU Negative 07/05/2022 05:43 PM    GLUCOSEU Negative 07/05/2022 05:43 PM     ABG:    Lab Results   Component Value Date/Time    PH 7.452 07/11/2022 05:07 AM    PCO2 40.0 07/11/2022 05:07 AM    PO2 133.6 07/11/2022 05:07 AM    HCO3 27.3 07/11/2022 05:07 AM    BE 3.1 07/11/2022 05:07 AM    O2SAT 98.8 07/11/2022 05:07 AM     HgBA1c:    Lab Results   Component Value Date/Time    LABA1C 6.5 05/12/2022 01:44 AM     Urine Toxicology:  No components found for: Baltazar Sophia, IMARTHC, IOPIATES, IPHENCYC    Imaging Studies:    Chest X-ray 7/11/22   1. Patchy right perihilar and left lower lobe airspace disease   2. Status post removal of the endotracheal tube and NG tube       3. There is no pneumothorax.          Resident's Assessment and Plan ASSESSMENT:  1. Acute encephalopathy, multifactorial 2/2 septic shock, digoxin toxicity, uremia, improving  2. Acute hypoxic hypercapnic respiratory failure 2/2 mod R pleural effusion, possible HAP vs acute decompensated  HFpEF exacerbation. -2D echo:7/6/2022: EF 70-75%, indeterminate DD, N LV size and function, moderately to  severely dilated LA,  moderately dilated  RV, mildly enlarged RA, RSVP at least 60  mmHg, no AS, MS, mild TR, dilated IVC  3. Pulmonary hypertension likely Type II 2/2 acute decompensated diastolic heart failure, in the setting of Hx SERENA  4. Moderate R pleural effusion, likely 2/2 HAP (Klebsiella pneumoniae) vs acute decompensated heart failure. Pleural fluid/serum LDH 0.59, PF/serum protein 0.41. Pending cultures. CXR-complete aeration of R lung field. 5. HAP (Klebsiella pneumoniae on resp culture, light growth) D4 Zosyn)  5. Shock, likely septic (HAP) less likely cardiogenic (small pericardial effusion on CT)  6. Bradycardia, hypotension 2/2 Digoxin toxicity in the setting of SHANTANU. Digoxin level 3.7>>2  7. Elevated pro-BNP, multifactorial, likely acute decompensated HFpEF, septic shock. 2D echo as above  8. Elevated troponin likely 2/2 demand ischemia  9. Permanent atrial fibrillation, rate controlled. 10. SHANTANU Stage III on CKD multifactorial, pre-renal (hemodynamically mediated, DHN 2/2 diuretic use, poor oral intake); vs ATN 2/2 septic shock. UO 1.75 L/24 hrs, crea trending down to 2.3 mg/dL. 11Thrombocytopenia, etiology? Likely sepsis?, less likely HIT (did not receive any heparin products). D-dimer and fibrinogen wnl, INR 2 today. S/p 4 units platelet->46 today  12. Acute on chronic macrocytic anemia, multifactorial, sepsis, blood draws, r/o hemolysis, no overt signs of bleeding. Hb 7.4 today, platelet 46, iron studies more of anemia of chronic disease, Vit B12, folate wnl, no overt bleeding, haptoglobin 175  11. Hyperkalemia 2/2 SHANTANU, resolved.    12. HAGMA 2/2 lactic acidosis (hypoperfusion, septic shock?),  uremia 2/2 SHANTANU, improving. Lactic acid down to 1.7  13. Hx of asthma  14. Hx of SERENA. Not using CPAP at home  15. Hx of HTN  16. Hx of CAD  17. Hx of Type 2 DM with neuropathy. On Lantus 10 and Novolog SS at home  18. Hx of depression/anxiety. 19. Hx of restless leg syndrome  20. Hx of Parkinson's disease. On Sinemet and ropinirole at home, which was discontinued upon discharge at [address]:  -CXR daily  -CMP daily, BMP, Mg, Phos q12hrs  -PT, INR, APTT daily  -FOBT  -POCT glucose q4hrs, change to [facility] & HS once with diet  -wean Precedex drip  -continue amiodarone drip while NPO  -continue metoprolol 5 mg IV q6hrs while NPO  -holding aspirin and apixaban for now for thrombocytopenia  -continue albumin and furosemide IV BID  -continue piperacillin-tazobactam, last dose in AM to complete 7 day-course  -continue Lantus 5 units HS and LDSS   -continue famotidine 10 mg IV daily( renally dosed)  -monitor chest tube output Qshift and record, will remove chest tube once out[put is less than or equal to 150 cc in 24 hrs  -avoid NSAIDs and other nephrotoxic agents  -continue to monitor renal status  -SLP evaluation  -change IV meds to PO once with diet  -follow Hema Onc recs  -monitor neuro status and hemodynamics  -strict I & O  -keep HOB 30-45 degrees    PT/OT:   DVT/GI prophylaxis: PCDs/famotidine  Disposition: continue current John Guido MD, PGY-2  Attending physician: Dr. Tyler David    I personally saw, examined and provided care for the patient. Radiographs, labs and medication list were reviewed by me independently. I spoke with bedside nursing, therapists and consultants. Critical care services and times documented are independent of procedures and multidisciplinary rounds with Residents. Additionally comprehensive, multidisciplinary rounds were conducted with the MICU team. The case was discussed in detail and plans for care were established.  Review of Residents documentation was conducted and revisions were made as appropriate. I agree with the above documented exam, problem list and plan of care.    Triston Vinson MD   CCT excluding procedures :45'

## 2022-07-12 ENCOUNTER — APPOINTMENT (OUTPATIENT)
Dept: GENERAL RADIOLOGY | Age: 72
DRG: 870 | End: 2022-07-12
Payer: MEDICARE

## 2022-07-12 LAB
ALBUMIN SERPL-MCNC: 4.1 G/DL (ref 3.5–5.2)
ALP BLD-CCNC: 68 U/L (ref 35–104)
ALT SERPL-CCNC: <5 U/L (ref 0–32)
ANION GAP SERPL CALCULATED.3IONS-SCNC: 10 MMOL/L (ref 7–16)
ANION GAP SERPL CALCULATED.3IONS-SCNC: 9 MMOL/L (ref 7–16)
APTT: 28.3 SEC (ref 24.5–35.1)
AST SERPL-CCNC: 13 U/L (ref 0–31)
BASOPHILS ABSOLUTE: 0.01 E9/L (ref 0–0.2)
BASOPHILS RELATIVE PERCENT: 0.2 % (ref 0–2)
BILIRUB SERPL-MCNC: 1 MG/DL (ref 0–1.2)
BUN BLDV-MCNC: 38 MG/DL (ref 6–23)
BUN BLDV-MCNC: 45 MG/DL (ref 6–23)
CALCIUM SERPL-MCNC: 8.6 MG/DL (ref 8.6–10.2)
CALCIUM SERPL-MCNC: 8.6 MG/DL (ref 8.6–10.2)
CHLORIDE BLD-SCNC: 105 MMOL/L (ref 98–107)
CHLORIDE BLD-SCNC: 105 MMOL/L (ref 98–107)
CHLORIDE URINE RANDOM: 65 MMOL/L
CO2: 30 MMOL/L (ref 22–29)
CO2: 32 MMOL/L (ref 22–29)
CREAT SERPL-MCNC: 1.1 MG/DL (ref 0.5–1)
CREAT SERPL-MCNC: 1.3 MG/DL (ref 0.5–1)
CREATININE URINE: 59 MG/DL (ref 29–226)
EOSINOPHILS ABSOLUTE: 0.02 E9/L (ref 0.05–0.5)
EOSINOPHILS RELATIVE PERCENT: 0.5 % (ref 0–6)
GFR AFRICAN AMERICAN: 49
GFR AFRICAN AMERICAN: 59
GFR NON-AFRICAN AMERICAN: 40 ML/MIN/1.73
GFR NON-AFRICAN AMERICAN: 49 ML/MIN/1.73
GLUCOSE BLD-MCNC: 165 MG/DL (ref 74–99)
GLUCOSE BLD-MCNC: 216 MG/DL (ref 74–99)
HCT VFR BLD CALC: 24.5 % (ref 34–48)
HEMOGLOBIN: 7.3 G/DL (ref 11.5–15.5)
IMMATURE GRANULOCYTES #: 0.09 E9/L
IMMATURE GRANULOCYTES %: 2.1 % (ref 0–5)
INR BLD: 2
LYMPHOCYTES ABSOLUTE: 0.95 E9/L (ref 1.5–4)
LYMPHOCYTES RELATIVE PERCENT: 22.6 % (ref 20–42)
MAGNESIUM: 1.8 MG/DL (ref 1.6–2.6)
MAGNESIUM: 2.1 MG/DL (ref 1.6–2.6)
MCH RBC QN AUTO: 26.5 PG (ref 26–35)
MCHC RBC AUTO-ENTMCNC: 29.8 % (ref 32–34.5)
MCV RBC AUTO: 89.1 FL (ref 80–99.9)
METER GLUCOSE: 168 MG/DL (ref 74–99)
METER GLUCOSE: 171 MG/DL (ref 74–99)
METER GLUCOSE: 198 MG/DL (ref 74–99)
METER GLUCOSE: 225 MG/DL (ref 74–99)
MONOCYTES ABSOLUTE: 0.39 E9/L (ref 0.1–0.95)
MONOCYTES RELATIVE PERCENT: 9.3 % (ref 2–12)
NEUTROPHILS ABSOLUTE: 2.74 E9/L (ref 1.8–7.3)
NEUTROPHILS RELATIVE PERCENT: 65.3 % (ref 43–80)
ORGANISM: ABNORMAL
ORGANISM: ABNORMAL
PDW BLD-RTO: 14.9 FL (ref 11.5–15)
PHOSPHORUS: 1.9 MG/DL (ref 2.5–4.5)
PHOSPHORUS: 2 MG/DL (ref 2.5–4.5)
PLATELET # BLD: 14 E9/L (ref 130–450)
PLATELET CONFIRMATION: NORMAL
PMV BLD AUTO: ABNORMAL FL (ref 7–12)
POTASSIUM SERPL-SCNC: 3.6 MMOL/L (ref 3.5–5)
POTASSIUM SERPL-SCNC: 3.7 MMOL/L (ref 3.5–5)
POTASSIUM, UR: 15.4 MMOL/L
PRO-BNP: ABNORMAL PG/ML (ref 0–125)
PROTHROMBIN TIME: 21.5 SEC (ref 9.3–12.4)
RBC # BLD: 2.75 E12/L (ref 3.5–5.5)
SODIUM BLD-SCNC: 144 MMOL/L (ref 132–146)
SODIUM BLD-SCNC: 147 MMOL/L (ref 132–146)
SODIUM URINE: 67 MMOL/L
TOTAL PROTEIN: 5.7 G/DL (ref 6.4–8.3)
UREA NITROGEN, UR: 751 MG/DL (ref 800–1666)
WBC # BLD: 4.2 E9/L (ref 4.5–11.5)
WOUND/ABSCESS: ABNORMAL
WOUND/ABSCESS: ABNORMAL

## 2022-07-12 PROCEDURE — 80053 COMPREHEN METABOLIC PANEL: CPT

## 2022-07-12 PROCEDURE — 85730 THROMBOPLASTIN TIME PARTIAL: CPT

## 2022-07-12 PROCEDURE — 84133 ASSAY OF URINE POTASSIUM: CPT

## 2022-07-12 PROCEDURE — 2580000003 HC RX 258: Performed by: INTERNAL MEDICINE

## 2022-07-12 PROCEDURE — 84100 ASSAY OF PHOSPHORUS: CPT

## 2022-07-12 PROCEDURE — 87205 SMEAR GRAM STAIN: CPT

## 2022-07-12 PROCEDURE — 36415 COLL VENOUS BLD VENIPUNCTURE: CPT

## 2022-07-12 PROCEDURE — 2500000003 HC RX 250 WO HCPCS: Performed by: INTERNAL MEDICINE

## 2022-07-12 PROCEDURE — 6370000000 HC RX 637 (ALT 250 FOR IP): Performed by: INTERNAL MEDICINE

## 2022-07-12 PROCEDURE — 94660 CPAP INITIATION&MGMT: CPT

## 2022-07-12 PROCEDURE — 85025 COMPLETE CBC W/AUTO DIFF WBC: CPT

## 2022-07-12 PROCEDURE — 6370000000 HC RX 637 (ALT 250 FOR IP): Performed by: STUDENT IN AN ORGANIZED HEALTH CARE EDUCATION/TRAINING PROGRAM

## 2022-07-12 PROCEDURE — 84300 ASSAY OF URINE SODIUM: CPT

## 2022-07-12 PROCEDURE — 6370000000 HC RX 637 (ALT 250 FOR IP)

## 2022-07-12 PROCEDURE — 92526 ORAL FUNCTION THERAPY: CPT

## 2022-07-12 PROCEDURE — 2700000000 HC OXYGEN THERAPY PER DAY

## 2022-07-12 PROCEDURE — S5553 INSULIN LONG ACTING 5 U: HCPCS | Performed by: INTERNAL MEDICINE

## 2022-07-12 PROCEDURE — 83880 ASSAY OF NATRIURETIC PEPTIDE: CPT

## 2022-07-12 PROCEDURE — 2580000003 HC RX 258

## 2022-07-12 PROCEDURE — 87070 CULTURE OTHR SPECIMN AEROBIC: CPT

## 2022-07-12 PROCEDURE — 85610 PROTHROMBIN TIME: CPT

## 2022-07-12 PROCEDURE — 6360000002 HC RX W HCPCS: Performed by: INTERNAL MEDICINE

## 2022-07-12 PROCEDURE — 94640 AIRWAY INHALATION TREATMENT: CPT

## 2022-07-12 PROCEDURE — 80048 BASIC METABOLIC PNL TOTAL CA: CPT

## 2022-07-12 PROCEDURE — 2060000000 HC ICU INTERMEDIATE R&B

## 2022-07-12 PROCEDURE — 82668 ASSAY OF ERYTHROPOIETIN: CPT

## 2022-07-12 PROCEDURE — 84540 ASSAY OF URINE/UREA-N: CPT

## 2022-07-12 PROCEDURE — 99232 SBSQ HOSP IP/OBS MODERATE 35: CPT | Performed by: INTERNAL MEDICINE

## 2022-07-12 PROCEDURE — 82436 ASSAY OF URINE CHLORIDE: CPT

## 2022-07-12 PROCEDURE — 82962 GLUCOSE BLOOD TEST: CPT

## 2022-07-12 PROCEDURE — 83735 ASSAY OF MAGNESIUM: CPT

## 2022-07-12 PROCEDURE — 71045 X-RAY EXAM CHEST 1 VIEW: CPT

## 2022-07-12 PROCEDURE — 82570 ASSAY OF URINE CREATININE: CPT

## 2022-07-12 RX ORDER — QUETIAPINE FUMARATE 25 MG/1
25 TABLET, FILM COATED ORAL 2 TIMES DAILY
Status: DISCONTINUED | OUTPATIENT
Start: 2022-07-12 | End: 2022-07-27 | Stop reason: HOSPADM

## 2022-07-12 RX ORDER — METOPROLOL TARTRATE 50 MG/1
50 TABLET, FILM COATED ORAL 2 TIMES DAILY
Status: DISCONTINUED | OUTPATIENT
Start: 2022-07-12 | End: 2022-07-12

## 2022-07-12 RX ORDER — DEXTROSE MONOHYDRATE 50 MG/ML
INJECTION, SOLUTION INTRAVENOUS CONTINUOUS
Status: ACTIVE | OUTPATIENT
Start: 2022-07-12 | End: 2022-07-12

## 2022-07-12 RX ORDER — MECOBALAMIN 5000 MCG
5 TABLET,DISINTEGRATING ORAL NIGHTLY
Status: DISCONTINUED | OUTPATIENT
Start: 2022-07-12 | End: 2022-07-27 | Stop reason: HOSPADM

## 2022-07-12 RX ORDER — MAGNESIUM SULFATE IN WATER 40 MG/ML
2000 INJECTION, SOLUTION INTRAVENOUS ONCE
Status: COMPLETED | OUTPATIENT
Start: 2022-07-12 | End: 2022-07-12

## 2022-07-12 RX ORDER — PHYTONADIONE 5 MG/1
10 TABLET ORAL ONCE
Status: COMPLETED | OUTPATIENT
Start: 2022-07-12 | End: 2022-07-12

## 2022-07-12 RX ORDER — METOPROLOL TARTRATE 50 MG/1
50 TABLET, FILM COATED ORAL 2 TIMES DAILY
Status: DISCONTINUED | OUTPATIENT
Start: 2022-07-12 | End: 2022-07-24

## 2022-07-12 RX ADMIN — SODIUM CHLORIDE: 9 INJECTION, SOLUTION INTRAVENOUS at 02:29

## 2022-07-12 RX ADMIN — AMIODARONE HYDROCHLORIDE 200 MG: 200 TABLET ORAL at 08:35

## 2022-07-12 RX ADMIN — QUETIAPINE FUMARATE 25 MG: 25 TABLET ORAL at 20:21

## 2022-07-12 RX ADMIN — IPRATROPIUM BROMIDE AND ALBUTEROL SULFATE 1 AMPULE: .5; 2.5 SOLUTION RESPIRATORY (INHALATION) at 20:42

## 2022-07-12 RX ADMIN — POTASSIUM BICARBONATE 20 MEQ: 782 TABLET, EFFERVESCENT ORAL at 08:37

## 2022-07-12 RX ADMIN — ATORVASTATIN CALCIUM 20 MG: 20 TABLET, FILM COATED ORAL at 08:35

## 2022-07-12 RX ADMIN — INSULIN LISPRO 1 UNITS: 100 INJECTION, SOLUTION INTRAVENOUS; SUBCUTANEOUS at 11:10

## 2022-07-12 RX ADMIN — SODIUM CHLORIDE, PRESERVATIVE FREE 10 ML: 5 INJECTION INTRAVENOUS at 20:21

## 2022-07-12 RX ADMIN — FAMOTIDINE 10 MG: 20 TABLET ORAL at 08:37

## 2022-07-12 RX ADMIN — IPRATROPIUM BROMIDE AND ALBUTEROL SULFATE 1 AMPULE: .5; 2.5 SOLUTION RESPIRATORY (INHALATION) at 08:54

## 2022-07-12 RX ADMIN — Medication 5 MG: at 20:20

## 2022-07-12 RX ADMIN — POTASSIUM PHOSPHATE, MONOBASIC AND POTASSIUM PHOSPHATE, DIBASIC 15 MMOL: 224; 236 INJECTION, SOLUTION, CONCENTRATE INTRAVENOUS at 08:53

## 2022-07-12 RX ADMIN — MAGNESIUM SULFATE HEPTAHYDRATE 2000 MG: 40 INJECTION, SOLUTION INTRAVENOUS at 09:06

## 2022-07-12 RX ADMIN — INSULIN LISPRO 1 UNITS: 100 INJECTION, SOLUTION INTRAVENOUS; SUBCUTANEOUS at 20:22

## 2022-07-12 RX ADMIN — IPRATROPIUM BROMIDE AND ALBUTEROL SULFATE 1 AMPULE: .5; 2.5 SOLUTION RESPIRATORY (INHALATION) at 17:40

## 2022-07-12 RX ADMIN — PHYTONADIONE 10 MG: 5 TABLET ORAL at 12:46

## 2022-07-12 RX ADMIN — DEXTROSE MONOHYDRATE: 50 INJECTION, SOLUTION INTRAVENOUS at 12:49

## 2022-07-12 RX ADMIN — BUDESONIDE 500 MCG: 0.5 SUSPENSION RESPIRATORY (INHALATION) at 20:42

## 2022-07-12 RX ADMIN — INSULIN LISPRO 1 UNITS: 100 INJECTION, SOLUTION INTRAVENOUS; SUBCUTANEOUS at 08:38

## 2022-07-12 RX ADMIN — SODIUM CHLORIDE, PRESERVATIVE FREE 10 ML: 5 INJECTION INTRAVENOUS at 08:39

## 2022-07-12 RX ADMIN — CARBIDOPA AND LEVODOPA 2 TABLET: 25; 100 TABLET, EXTENDED RELEASE ORAL at 08:38

## 2022-07-12 RX ADMIN — INSULIN GLARGINE-YFGN 5 UNITS: 100 INJECTION, SOLUTION SUBCUTANEOUS at 20:22

## 2022-07-12 RX ADMIN — QUETIAPINE FUMARATE 25 MG: 25 TABLET ORAL at 08:37

## 2022-07-12 RX ADMIN — CARBIDOPA AND LEVODOPA 2 TABLET: 25; 100 TABLET, EXTENDED RELEASE ORAL at 14:48

## 2022-07-12 RX ADMIN — DEXTROSE MONOHYDRATE: 50 INJECTION, SOLUTION INTRAVENOUS at 09:14

## 2022-07-12 RX ADMIN — SODIUM CHLORIDE 1.3 MCG/KG/HR: 9 INJECTION, SOLUTION INTRAVENOUS at 04:53

## 2022-07-12 RX ADMIN — BUDESONIDE 500 MCG: 0.5 SUSPENSION RESPIRATORY (INHALATION) at 08:54

## 2022-07-12 RX ADMIN — METOPROLOL TARTRATE 50 MG: 50 TABLET, FILM COATED ORAL at 08:36

## 2022-07-12 RX ADMIN — IPRATROPIUM BROMIDE AND ALBUTEROL SULFATE 1 AMPULE: .5; 2.5 SOLUTION RESPIRATORY (INHALATION) at 12:30

## 2022-07-12 RX ADMIN — METOPROLOL TARTRATE 50 MG: 50 TABLET, FILM COATED ORAL at 20:21

## 2022-07-12 RX ADMIN — ACETAMINOPHEN 650 MG: 325 TABLET ORAL at 01:12

## 2022-07-12 RX ADMIN — CEFEPIME 2000 MG: 2 INJECTION, POWDER, FOR SOLUTION INTRAVENOUS at 02:31

## 2022-07-12 RX ADMIN — CEFEPIME 2000 MG: 2 INJECTION, POWDER, FOR SOLUTION INTRAVENOUS at 15:07

## 2022-07-12 RX ADMIN — CARBIDOPA AND LEVODOPA 2 TABLET: 25; 100 TABLET, EXTENDED RELEASE ORAL at 20:20

## 2022-07-12 RX ADMIN — INSULIN LISPRO 2 UNITS: 100 INJECTION, SOLUTION INTRAVENOUS; SUBCUTANEOUS at 18:41

## 2022-07-12 RX ADMIN — ACETAMINOPHEN 650 MG: 325 TABLET ORAL at 08:35

## 2022-07-12 ASSESSMENT — PAIN SCALES - GENERAL: PAINLEVEL_OUTOF10: 0

## 2022-07-12 NOTE — PROGRESS NOTES
place  Musculoskeletal: No joint swelling, no muscle tenderness. ROM normal in all joints of extremities.    Neurologic: Mental status: alert, awake and oriented, 5/5 motor strength, no numbness or tingling  Lines     site day    Art line   R Radial 7/6   TLC R IJ 7/6   PICC None    Hemoaccess None    Oxygen:    2LPM/NC  Mechanical Ventilation:  Extubated 7/10/22  ABG:     Lab Results   Component Value Date/Time    PH 7.452 07/11/2022 05:07 AM    PCO2 40.0 07/11/2022 05:07 AM    PO2 133.6 07/11/2022 05:07 AM    HCO3 27.3 07/11/2022 05:07 AM    BE 3.1 07/11/2022 05:07 AM    THB 8.7 07/11/2022 05:07 AM    O2SAT 98.8 07/11/2022 05:07 AM        Medications     Infusions: (Fluid, Sedation, Vasopressors)  IVF:   N/A  Vasopressors  N/A  Sedation  Precedex at 1    Nutrition:   NPO    ATB:   Antibiotics  Days   Zosyn 7/5 dcd 7/11   cefepime 7/11         Skin issues: bruise L arm  Patient currently has   Urinary cath  DVT prophylaxis/ GI prophylaxis,    PT/OT    Labs     CBC with Differential:    Lab Results   Component Value Date/Time    WBC 4.2 07/12/2022 06:00 AM    RBC 2.75 07/12/2022 06:00 AM    HGB 7.3 07/12/2022 06:00 AM    HCT 24.5 07/12/2022 06:00 AM    PLT 14 07/12/2022 06:00 AM    MCV 89.1 07/12/2022 06:00 AM    MCH 26.5 07/12/2022 06:00 AM    MCHC 29.8 07/12/2022 06:00 AM    RDW 14.9 07/12/2022 06:00 AM    NRBC 0.0 03/15/2019 09:10 AM    SEGSPCT 74 08/20/2013 10:45 AM    LYMPHOPCT 22.6 07/12/2022 06:00 AM    MONOPCT 9.3 07/12/2022 06:00 AM    MYELOPCT 0.9 07/09/2022 12:30 AM    EOSPCT 1 06/16/2017 12:00 AM    BASOPCT 0.2 07/12/2022 06:00 AM    MONOSABS 0.39 07/12/2022 06:00 AM    LYMPHSABS 0.95 07/12/2022 06:00 AM    EOSABS 0.02 07/12/2022 06:00 AM    BASOSABS 0.01 07/12/2022 06:00 AM     CMP:    Lab Results   Component Value Date/Time     07/12/2022 06:00 AM    K 3.6 07/12/2022 06:00 AM    K 5.0 07/06/2022 02:41 AM     07/12/2022 06:00 AM    CO2 32 07/12/2022 06:00 AM    BUN 45 07/12/2022 06:00 AM CREATININE 1.3 07/12/2022 06:00 AM    GFRAA 49 07/12/2022 06:00 AM    LABGLOM 40 07/12/2022 06:00 AM    GLUCOSE 165 07/12/2022 06:00 AM    PROT 5.7 07/12/2022 06:00 AM    LABALBU 4.1 07/12/2022 06:00 AM    CALCIUM 8.6 07/12/2022 06:00 AM    BILITOT 1.0 07/12/2022 06:00 AM    ALKPHOS 68 07/12/2022 06:00 AM    AST 13 07/12/2022 06:00 AM    ALT <5 07/12/2022 06:00 AM     PT/INR:    Lab Results   Component Value Date/Time    PROTIME 21.5 07/12/2022 06:00 AM    INR 2.0 07/12/2022 06:00 AM     PTT:    Lab Results   Component Value Date/Time    APTT 28.3 07/12/2022 06:00 AM   [APTT}  U/A:    Lab Results   Component Value Date/Time    COLORU Yellow 07/05/2022 05:43 PM    PROTEINU 30 07/05/2022 05:43 PM    PHUR 5.5 07/05/2022 05:43 PM    WBCUA 1-3 07/05/2022 05:43 PM    RBCUA NONE 07/05/2022 05:43 PM    RBCUA NONE 03/25/2013 09:00 PM    YEAST RARE 10/27/2015 07:18 PM    BACTERIA FEW 07/05/2022 05:43 PM    CLARITYU Clear 07/05/2022 05:43 PM    SPECGRAV 1.025 07/05/2022 05:43 PM    LEUKOCYTESUR Negative 07/05/2022 05:43 PM    UROBILINOGEN 0.2 07/05/2022 05:43 PM    BILIRUBINUR Negative 07/05/2022 05:43 PM    BLOODU Negative 07/05/2022 05:43 PM    GLUCOSEU Negative 07/05/2022 05:43 PM     ABG:    Lab Results   Component Value Date/Time    PH 7.452 07/11/2022 05:07 AM    PCO2 40.0 07/11/2022 05:07 AM    PO2 133.6 07/11/2022 05:07 AM    HCO3 27.3 07/11/2022 05:07 AM    BE 3.1 07/11/2022 05:07 AM    O2SAT 98.8 07/11/2022 05:07 AM     HgBA1c:    Lab Results   Component Value Date/Time    LABA1C 6.5 05/12/2022 01:44 AM     Urine Toxicology:  No components found for: IAMMENTA, IBARBIT, IBENZO, ICOCAINE, IMARTHC, IOPIATES, IPHENCYC    Imaging Studies:  2D echo:7/6/2022: EF 70-75%, indeterminate DD, N LV size and function, moderately to  severely dilated LA,  moderately dilated  RV, mildly enlarged RA, RSVP at least 60  mmHg, no AS, MS, mild TR, dilated IVC    Chest X-ray 7/12/22   Grossly stable pulmonary opacities in the lower left lung, which may   represent atelectasis or pneumonia. Resident's Assessment and Plan   ASSESSMENT:  1. Acute hypoxic hypercapnic respiratory failure 2/2 mod R pleural effusion, possible HAP vs acute decompensated  HFpEF exacerbation (EF 70-75%)  2. Pulmonary hypertension likely Type II 2/2 acute decompensated diastolic heart failure, in the setting of Hx SERENA  3. Moderate R pleural effusion, likely 2/2 HAP (Klebsiella pneumoniae) vs acute decompensated heart failure. D4 CT, 250 cc output in 24 hrs  4. HAP (Klebsiella pneumoniae on resp culture, light growth). D1 cefepime  5. Elevated pro-BNP, multifactorial, likely acute decompensated HFpEF, septic shock. 6. Elevated troponin likely 2/2 demand ischemia  7. Permanent atrial fibrillation, s/p DCCV (April and May 2022), now rate controlled. 8. SHANTANU Stage III on CKD multifactorial, pre-renal (hemodynamically mediated, DHN 2/2 diuretic use, poor oral intake); vs ATN 2/2 septic shock. 9. Hypernatremia likely 2/2 over diuresis  10. Metabolic alkalosis likely contraction alkalosis 2/2 diuresis, poor oral intake  11. Pancytopenia likely 2/2 BM suppression 2/2 chronic illness, meds (Zosyn)  12. Acute on chronic macrocytic anemia, multifactorial, likely 2/2 chronic disease, blood draws,  Less likely hemolysis (no schistocytes, haptoglobin wnl) s/p 1 unit PRBC 7/11, FOBT positive today, with pinkish tinge on chest tube output  13. Thrombocytopenia,likely 2/2 #11, less likely HIT (not exposed to heparin products), DIC, hemolysis ruled out (work-up negative)  14. Prolonged INR, ? HFP wnl, no overt bleeding, apixaban and aspirin on hold. Given Vit K yesterday for INR 3.2>2 today  15. Low grade fever, multifactorial, ? meds (Precedex?), CLABSI?, no new leukocytosis, or hemodynamic instability   16. Hx of asthma  17. Hx of SERENA. Not using CPAP at home  18. Hx of HTN. -160`s  19. Hx of CAD  20. Hx of Type 2 DM with neuropathy. On Lantus 10 and Novolog SS at home  21. Hx of depression/anxiety. 22. Hx of restless leg syndrome  23. Hx of Parkinson's disease. On Sinemet and ropinirole at home, which was discontinued upon discharge at [address]:  1. Acute encephalopathy, multifactorial 2/2 septic shock, digoxin toxicity, uremia,resolved  2. Shock, likely septic (HAP), digoxin toxicity   3. Bradycardia, hypotension 2/2 Digoxin toxicity in the setting of SHANTANU. Digoxin level 3.7>>2  4. HAGMA 2/2 lactic acidosis (hypoperfusion, septic shock?),  uremia 2/2 SHANTANU, improving. Lactic acid down to 1.7  5. Hyperkalemia 2/2 SHANTANU, resolved.      PLAN:  -CXR daily  -CMP daily, BMP, Mg, Phos q12hrs  -PT, INR, APTT daily  -urine electrolytes, urea and creatinine  -check pro-BNP  -POCT glucose AC & HS   -remove central line, send tip for gram stain and culture  -discontinue Precedex drip (can cause fever in 5-7%)  -continue amiodarone 200 mg daily PO  -increase metoprolol to 50 mg BID PO  -continue to hold aspirin and apixaban  -hold furosemide for now because of contraction alkalosis  -replacing magnesium, potassium and phosphorus  -continue cefepime IV  -continue nebulization treatment  -continue Lantus 5 units HS and LDSS   -given another vitamin K 10 mg PO today  -continue famotidine 10 mg PO daily for GI prophylaxis  -monitor chest tube output Qshift and record, will remove chest tube once out[put is less than or equal to 150 cc in 24 hrs  -avoid NSAIDs and other nephrotoxic agents  -continue to monitor renal status  -follow Hema Onc and Nephro recs  -monitor neuro status and hemodynamics  -strict I & O  -keep HOB 30-45 degrees  -PT/OT still to see patient  -consult Palliative care for goals of care   -consult Pulmonology when on the floor  -for transfer to intermediate floor today    PT/OT:   DVT/GI prophylaxis: PCDs/famotidine  Disposition: continue current Leonidas Best MD, PGY-2  Attending physician: Dr. Starr Davis      I personally saw, examined and provided care for the patient. Radiographs, labs and medication list were reviewed by me independently. I spoke with bedside nursing, therapists and consultants. Critical care services and times documented are independent of procedures and multidisciplinary rounds with Residents. Additionally comprehensive, multidisciplinary rounds were conducted with the MICU team. The case was discussed in detail and plans for care were established. Review of Residents documentation was conducted and revisions were made as appropriate. I agree with the above documented exam, problem list and plan of care.    Tesfaye Sen MD   CCT excluding procedures :39'

## 2022-07-12 NOTE — PROGRESS NOTES
[facility] MD Jolanta Murray MD Delphine Sabina MD .  Progress note  Patient's Name: Leah Gill  6:48 PM  7/12/2022 7/7 : seen in her room intubated mechanically ventilated fio2 40 . LE edema 1-2+ . On levophed . UO ok over last 24 hours     7/8 pt not in her room when coming to see her . She is in IR lab. Case d/w RN     7/9 seen in her room d/w ICU resident   Still on some pressors actually BP low when seeing her   Has CT in place with good drainage . Good UO in response to lasix via genao cath . Fio2 40 PEEP 8       7/10 seen in ICU intubated mechanically ventilated fio2 40 PEEP 8   1-2+ edema in UE and LEs   No pressors  On precedex drip     7/11 : seen in ICU extubated earlier today . BP stable on HFNC    7/12 seen in her room ,extubated , on o2/nc . Chest tube in place . Fevers today and precedex drip put on hold .good UO over last 24 hours     History of Present Ilness:      69-year old female with a past medical history of hypertension, A. fib, CAD, asthma, HFpEF, CKD, SERENA, hyperlipidemia, type II DM, anxiety/depression, Parkinson's disease, restless leg syndrome who presented in the ED for altered mental status.     She recently had a right second toe amputation back last month at [facility].  She was discharged after 2 weeks. According to the patient's family, she was doing okay until few hours prior to admission, patient was noted to be acting different yesterday morning. She was noted to be drowsy    Nephrology asked to see for SHANTANU   I did see pt in ICU she is intubated mechanically ventilated . She is on levophed . She has some LE edema. Vent setting stable .    UO marginal .       Past Medical History:   Diagnosis Date    A-fib ([address]     Acute on chronic congestive heart failure (HCC)     Anxiety     Asthma     CAD (coronary artery disease) 01/21/2016    Cancer [facility])  breast ca 2006    right lumpectomy    Chronic kidney disease nephrolithiasis    Depression     [facility] [address]     H/O mammogram     Hx MRSA infection     toe infection january 2012    Hyperlipidemia     Hypertension     Lateral epicondylitis     SERENA on CPAP     Parkinson's disease [facility] [address]     Tubal ligation status        Past Surgical History:   Procedure Laterality Date    BREAST LUMPECTOMY      BREAST REDUCTION SURGERY      CARDIAC CATHETERIZATION  4/28/2014    Dr. Drea Mccallum  01/11/2022    Dr. Kristine Moore  7/29/15    CT GUIDED CHEST TUBE  7/8/2022    CT GUIDED CHEST TUBE 7/8/2022 Flakita Matta MD [facility] CT    ENDOSCOPY, COLON, DIAGNOSTIC  7/19/15    GALLBLADDER SURGERY      LUMBAR LAMINECTOMY      TOE AMPUTATION      TONSILLECTOMY      UPPER GASTROINTESTINAL ENDOSCOPY         Family History   Problem Relation Age of Onset    Cancer Mother         Lung Ca    Cancer Father         lung Ca    Diabetes Sister     Heart Disease Sister     Seizures Son     Other Brother         sepsis        reports that she has never smoked. She has never used smokeless tobacco. She reports that she does not drink alcohol and does not use drugs.     Allergies:  Ciprofloxacin and Codeine    Current Medications:    QUEtiapine (SEROQUEL) tablet 25 mg, BID  dextrose 5 % solution, Continuous  metoprolol tartrate (LOPRESSOR) tablet 50 mg, BID  labetalol (NORMODYNE;TRANDATE) injection 10 mg, Q6H PRN  hydrALAZINE (APRESOLINE) injection 10 mg, Q6H PRN  famotidine (PEPCID) tablet 10 mg, Daily  amiodarone (CORDARONE) tablet 200 mg, Daily  insulin lispro (HUMALOG) injection vial 0-6 Units, 4x Daily AC & HS  carbidopa-levodopa (SINEMET CR)  MG per extended release tablet 2 tablet, TID  cefepime (MAXIPIME) 2000 mg IVPB minibag, Q12H  ondansetron (ZOFRAN) injection 4 mg, Q6H PRN  midodrine (PROAMATINE) tablet 2.5 mg, TID PRN  [Held by provider] furosemide (LASIX) injection 40 mg, BID  midazolam PF (VERSED) injection 0.5 mg, Q2H PRN  budesonide (PULMICORT) nebulizer suspension 500 mcg, BID  levalbuterol (XOPENEX) nebulization 0.63 mg, Q4H PRN  insulin glargine-yfgn (SEMGLEE-YFGN) injection vial 5 Units, Nightly  ipratropium-albuterol (DUONEB) nebulizer solution 1 ampule, Q4H WA  [Held by provider] aspirin chewable tablet 81 mg, Daily  polyethylene glycol (GLYCOLAX) packet 17 g, BID  [Held by provider] apixaban (ELIQUIS) tablet 5 mg, BID  atorvastatin (LIPITOR) tablet 20 mg, Daily  sodium chloride flush 0.9 % injection 5-40 mL, 2 times per day  sodium chloride flush 0.9 % injection 5-40 mL, PRN  0.9 % sodium chloride infusion, PRN  acetaminophen (TYLENOL) tablet 650 mg, Q6H PRN   Or  acetaminophen (TYLENOL) suppository 650 mg, Q6H PRN  glucose chewable tablet 16 g, PRN  dextrose bolus 10% 125 mL, PRN   Or  dextrose bolus 10% 250 mL, PRN  glucagon (rDNA) injection 1 mg, PRN  dextrose 5 % solution, PRN        Review of Systems:   Unable to obtain due to clinical conditon . Physical exam:   Vital signs /74   Pulse (!) 103   Temp 100.2 °F (37.9 °C) (Bladder)   Resp 21   Ht 5' (1.524 m)   Wt 230 lb (104.3 kg)   SpO2 91%   BMI 44.92 kg/m²   Gen : moderate distress  Head : at , nc   Neck : supple , no thyromegaly noted . Eyes : EOMI , PERRLA   CV : tachycardiac 1+ edema in LE   Lungs: good flow heard b/l   Abd : soft , NT , BS + , No Organomegaly appreciated . Skin : soft, dry . Neuro : CN  II-XII grossly intact , no focal neurologic deficit . Psych : cooperative .      Data:   Labs:  CBC with Differential:    Lab Results   Component Value Date/Time    WBC 4.2 07/12/2022 06:00 AM    RBC 2.75 07/12/2022 06:00 AM    HGB 7.3 07/12/2022 06:00 AM    HCT 24.5 07/12/2022 06:00 AM    PLT 14 07/12/2022 06:00 AM    MCV 89.1 07/12/2022 06:00 AM    MCH 26.5 07/12/2022 06:00 AM    MCHC 29.8 07/12/2022 06:00 AM    RDW 14.9 07/12/2022 06:00 AM    NRBC 0.0 03/15/2019 09:10 AM    SEGSPCT 74 08/20/2013 10:45 AM    LYMPHOPCT 22.6 07/12/2022 06:00 AM    MONOPCT 9.3 07/12/2022 06:00 AM    MYELOPCT 0.9 07/09/2022 12:30 AM    EOSPCT 1 06/16/2017 12:00 AM    BASOPCT 0.2 07/12/2022 06:00 AM    MONOSABS 0.39 07/12/2022 06:00 AM    LYMPHSABS 0.95 07/12/2022 06:00 AM    EOSABS 0.02 07/12/2022 06:00 AM    BASOSABS 0.01 07/12/2022 06:00 AM     CMP:    Lab Results   Component Value Date/Time     07/12/2022 06:00 AM    K 3.6 07/12/2022 06:00 AM    K 5.0 07/06/2022 02:41 AM     07/12/2022 06:00 AM    CO2 32 07/12/2022 06:00 AM    BUN 45 07/12/2022 06:00 AM    CREATININE 1.3 07/12/2022 06:00 AM    GFRAA 49 07/12/2022 06:00 AM    LABGLOM 40 07/12/2022 06:00 AM    GLUCOSE 165 07/12/2022 06:00 AM    PROT 5.7 07/12/2022 06:00 AM    LABALBU 4.1 07/12/2022 06:00 AM    CALCIUM 8.6 07/12/2022 06:00 AM    BILITOT 1.0 07/12/2022 06:00 AM    ALKPHOS 68 07/12/2022 06:00 AM    AST 13 07/12/2022 06:00 AM    ALT <5 07/12/2022 06:00 AM     Ionized Calcium:  No results found for: IONCA  Magnesium:    Lab Results   Component Value Date/Time    MG 1.8 07/12/2022 06:00 AM     Phosphorus:    Lab Results   Component Value Date/Time    PHOS 2.0 07/12/2022 06:00 AM     U/A:    Lab Results   Component Value Date/Time    COLORU Yellow 07/05/2022 05:43 PM    PHUR 5.5 07/05/2022 05:43 PM    WBCUA 1-3 07/05/2022 05:43 PM    RBCUA NONE 07/05/2022 05:43 PM    RBCUA NONE 03/25/2013 09:00 PM    YEAST RARE 10/27/2015 07:18 PM    BACTERIA FEW 07/05/2022 05:43 PM    CLARITYU Clear 07/05/2022 05:43 PM    SPECGRAV 1.025 07/05/2022 05:43 PM    LEUKOCYTESUR Negative 07/05/2022 05:43 PM    UROBILINOGEN 0.2 07/05/2022 05:43 PM    BILIRUBINUR Negative 07/05/2022 05:43 PM    BLOODU Negative 07/05/2022 05:43 PM    GLUCOSEU Negative 07/05/2022 05:43 PM     Microalbumen/Creatinine ratio:  No components found for: RUCREAT  Iron Saturation:  No components found for: PERCENTFE  TIBC:    Lab Results   Component Value Date/Time    TIBC 152 07/09/2022 08:01 AM     FERRITIN:    Lab Results Component Value Date/Time    FERRITIN 243 07/09/2022 08:01 AM        Imaging:  XR CHEST PORTABLE   Final Result   Grossly stable pulmonary opacities in the lower left lung, which may   represent atelectasis or pneumonia. XR CHEST PORTABLE   Final Result   1. Patchy right perihilar and left lower lobe airspace disease   2. Status post removal of the endotracheal tube and NG tube      3. There is no pneumothorax. XR CHEST PORTABLE   Final Result   Lines and tubes are stable with no pneumothorax on the right. Some   improvement seen in the region of left lung base in the interval.         XR CHEST PORTABLE   Final Result   The evaluation is limited due to low lung volumes. No interval change compared to prior exam.      Lines and tubes are in unchanged position. XR ABDOMEN FOR NG/OG/NE TUBE PLACEMENT   Final Result   Catheter is in the stomach. XR CHEST PORTABLE   Final Result   Catheter placed with significant right lung aeration improvement and no   pneumothorax         MRI BRAIN WO CONTRAST   Final Result   1. No acute intracranial abnormality. 2. No gross epileptogenic lesion is identified. 3. Bilateral mastoid effusions, which may be due to eustachian tube   dysfunction or otomastoiditis. RECOMMENDATIONS:   Unavailable         CT GUIDED CHEST TUBE   Final Result   Successful uncomplicated  right chest tube placement      Recommendations:   1. Follow-up tube check in 2 weeks   2. Flush tube daily with 5 to 10 mL of sterile saline            XR CHEST PORTABLE   Final Result   1. There is no interval change in the bilateral perihilar and lower lobe   airspace disease more prominent within the right lung. 2. Moderate size right pleural effusion. 3. There is no pneumothorax. US RETROPERITONEAL COMPLETE   Final Result   1. Marked bilateral renal cortical thinning. Echogenic renal parenchyma. No hydronephrosis.       2.  A Diaz catheter appears to be decompressing the bladder. XR CHEST PORTABLE   Final Result   Increasing infiltrate throughout the right lung persistent left basilar   alveolar infiltrate is well. XR CHEST PORTABLE   Final Result   Adequately positioned right internal jugular central venous line. Otherwise,   no significant change compared to prior exam glued in lines and tubes. US DUP LOWER EXTREMITIES BILATERAL VENOUS   Final Result   Within the visualized vessels there is no evidence for deep venous   thrombosis               XR CHEST PORTABLE   Final Result   1. No significant change. Cardiomegaly with right pleural effusion. 2.  Support tubes remain in appropriate position. CT HEAD WO CONTRAST   Final Result   No acute intracranial abnormality or significant change in the overall   appearance of the brain. MRI would be useful if symptoms persist.      RECOMMENDATIONS:   Unavailable         XR ABDOMEN FOR NG/OG/NE TUBE PLACEMENT   Final Result   Catheter is in the proximal stomach. XR CHEST PORTABLE   Final Result   Adequately positioned endotracheal tube. Nasogastric tube coursing below   diaphragm. Otherwise, stable exam.         CT ABDOMEN PELVIS WO CONTRAST Additional Contrast? None   Final Result   Increasing fluid within the abdomen when compared to the prior study. The   anasarca is also increased in appearance of the prior examination. Mild stranding seen around the mesentery which correlation to clinical lab   values is recommended to exclude possible pancreatitis or volume overload. Overall the appearance of the pancreas itself is grossly unremarkable. There   is only mild stranding seen throughout the mesenteric root. CT CHEST WO CONTRAST   Final Result   Bilateral lung infiltrates with greatest consolidation right lower lobe   probably secondary to atelectasis and or pneumonia. Moderate right pleural effusion. Cardiomegaly and small pericardial effusion. The pericardial effusion is new. Small volume right upper abdominal ascites along with anasarca. This is new. XR CHEST PORTABLE   Final Result   New opacity on the right which may relate to pleural effusion with adjacent   atelectasis and or infiltrate. Cardiomegaly and pulmonary vascular   congestion implying elevated left heart filling pressures. Assessment    Acute kidney injury non oligouric:   Baseline cr 0.9  Etiology of SHANTANU due to decrease effective in setting of intravascular volume depletion as evident by her need for levophed and her urine lytes with high avidity for cl and Na .   Basically bland urine sediment which make GN/vasculitis unlikely   High BUN in part due to steroids   LE edema noted though she is still on levophed        Continue hemodynamic support  Lasix on hold for now   Cut d5W to 50 cc/hr   Guide Abx per pharmacy dosing    Fu with ICU team .          -Encephalopathy metabolic multifactorial : per ICU team     -Digoxin toxicity s/p digbind    -Shock R/O septic shock               Electronically signed by Jagruti Evans MD on 7/12/2022 at 6:48 PM

## 2022-07-12 NOTE — CONSULTS
[place]  Department of Internal Medicine  Division of Pulmonary, Critical Care and Sleep Medicine  Consult Note    Hawa Jurado DO, MPH, [facility], [place], [address] [facility] Jhon CRABTREE MD      Patient: Francis Dubon  MRN: [id number]  : 1950    Encounter Time: 2:17 PM     Date of Admission: 2022 12:25 PM    Primary Care Physician: No primary care provider on file. Reason for Consultation: Drain CT management     HISTORY OF PRESENT ILLNESS : Francis Dubon 66 y.o. female was seen in consultation regarding the above chief compliant. The patient is a 66 y.o. female with a past medical history of  Acute on chronic congestive heart failure, coronary artery disease, chronic kidney disease, T2 DM, HTN, Hyperlipidemia, SERENA, Parkinson, Asthma, Anxiety, Breast Ca ( S/P right lumpectomy). Patient was drowsy while taking the history when she stated of having SOB since last 1 day which was gradual on onset and progressed. She denied having chest pain, palpitation, loss of consciousness, cough and fever.     As per the EMS family reports she had altered mental status for 45 minutes to an hour prior coming to the ED. She was increasingly drowsy, somnolent and not responding to all the questions. On arrival to the ED the patient was hypoxic with saturation of 88% on room air , had a blood pressure of 84/49 mm of Hg and bradycardic thereafter with the first measured HR of being 66. A/c to the EMS patient recently had her psychiatric medications changed and her right second toe amputation done on 2022. In the ED patient was kept in nasal prongs with 5L of O2 which later was increased to 15L/mins. She was kept in vasopressor Noradrenaline (levophed).  Labs showed Potassium of 5.5, with Urea 95, Cr 3.3, Calcium 7.7, troponin 57, high digoxin level 3.7, lactic acid 2.8, Hb 10.2, PT 33.7, INR 3.1, aPTT 42.9, lipase of 148 and negative routine mouth 3 times daily   Yes Historical Provider, MD   linezolid (ZYVOX) 600 MG tablet Take 600 mg by mouth 2 times daily   Yes Historical Provider, MD   cephALEXin (KEFLEX) 500 MG capsule Take 500 mg by mouth 3 times daily   Yes Historical Provider, MD   furosemide (LASIX) 20 MG tablet Take 20 mg by mouth daily   Yes Historical Provider, MD   digoxin (LANOXIN) 125 MCG tablet Take 125 mcg by mouth daily   Yes Historical Provider, MD   ferrous sulfate (IRON 325) 325 (65 Fe) MG tablet Take 325 mg by mouth daily (with breakfast)  Patient not taking: Reported on 7/7/2022    Historical Provider, MD   aspirin EC 81 MG EC tablet Take 1 tablet by mouth daily 5/17/22   Melodie Mena MD   apixaban (ELIQUIS) 5 MG TABS tablet Take 1 tablet by mouth 2 times daily 4/19/22   Armando Gaines MD   montelukast (SINGULAIR) 10 MG tablet Take 10 mg by mouth nightly    Historical Provider, MD   omeprazole (PRILOSEC) 40 MG delayed release capsule Take 40 mg by mouth daily     Historical Provider, MD   blood glucose test strips (ACCU-CHEK RIGOBERTO PLUS) strip 1 each by In Vitro route 2 times daily Indications: DISP ACCU-CHEK As needed. Historical Provider, MD   atorvastatin (LIPITOR) 20 MG tablet Take 1 tablet by mouth daily 10/21/19   Kody Bear MD   insulin aspart (NOVOLOG FLEXPEN) 100 UNIT/ML injection pen INJECT 0-10 UNITS INTO THE SKIN THREE TIMES DAILY(BEFORE MEALS) SLIDING SCALE (MAX DAILY 30 UNITS)  Patient taking differently: Indications: med.  approved 6/1/19-7/29/20 INJECT 0-10 UNITS INTO THE SKIN THREE TIMES DAILY(BEFORE MEALS) SLIDING SCALE (MAX DAILY 30 UNITS) 7/29/19   Kody Bear MD       ALLERGIES: Ciprofloxacin and Codeine       REVIEW OF SYSTEMS:  Otherwise negative if not reported or listed below      OBJECTIVE:     PHYSICAL EXAM:   VITALS:   Vitals:    07/12/22 1100 07/12/22 1200 07/12/22 1230 07/12/22 1300   BP: 124/72 96/72  100/63   Pulse: 75 78 82 72   Resp: 15 18 18 18   Temp:  100.4 °F (38 °C)     TempSrc:  Bladder     SpO2: 96% 95% 97% 97%   Weight:       Height:            Intake/Output Summary (Last 24 hours) at 7/12/2022 1417  Last data filed at 7/12/2022 1000  Gross per 24 hour   Intake 1687.86 ml   Output 1805 ml   Net -117.14 ml        CONSTITUTIONAL:    Alert  SKIN:     No rash,   HEENT:     EOMI, MMM, No thrush  NECK:    No bruits, No JVP appreciated  CV:      Sinus,  No murmur, No rubs, No gallops  PULMONARY:   Couse BS,  No Wheezing, No Rales, No Rhonchi      CT noted  ABDOMEN:     Soft, non-tender. BS normal. No R/R/G  EXT:    No deformities . No clubbing. Edema ower extremity edema, No venous stasis  PULSE:   Appears equal and palpable.   PSYCHIATRIC:  Seems appropriate, No acute psychosis  MS:    Gross weakness  NEUROLOGIC:   The clinical assessment is non-focal     DATA: IMAGING & TESTING:     LABORATORY TESTS:    CBC:   Lab Results   Component Value Date/Time    WBC 4.2 07/12/2022 06:00 AM    RBC 2.75 07/12/2022 06:00 AM    HGB 7.3 07/12/2022 06:00 AM    HCT 24.5 07/12/2022 06:00 AM    MCV 89.1 07/12/2022 06:00 AM    MCH 26.5 07/12/2022 06:00 AM    MCHC 29.8 07/12/2022 06:00 AM    RDW 14.9 07/12/2022 06:00 AM    PLT 14 07/12/2022 06:00 AM    MPV NOT CALC 07/12/2022 06:00 AM     CMP:    Lab Results   Component Value Date/Time     07/12/2022 06:00 AM    K 3.6 07/12/2022 06:00 AM    K 5.0 07/06/2022 02:41 AM     07/12/2022 06:00 AM    CO2 32 07/12/2022 06:00 AM    BUN 45 07/12/2022 06:00 AM    CREATININE 1.3 07/12/2022 06:00 AM    GFRAA 49 07/12/2022 06:00 AM    LABGLOM 40 07/12/2022 06:00 AM    GLUCOSE 165 07/12/2022 06:00 AM    PROT 5.7 07/12/2022 06:00 AM    LABALBU 4.1 07/12/2022 06:00 AM    CALCIUM 8.6 07/12/2022 06:00 AM    BILITOT 1.0 07/12/2022 06:00 AM    ALKPHOS 68 07/12/2022 06:00 AM    AST 13 07/12/2022 06:00 AM    ALT <5 07/12/2022 06:00 AM     Calcium:    Lab Results   Component Value Date/Time    CALCIUM 8.6 07/12/2022 06:00 AM     Ionized Calcium:  No results found for: IONCA  Magnesium:    Lab Results   Component Value Date/Time    MG 1.8 07/12/2022 06:00 AM     Phosphorus:    Lab Results   Component Value Date/Time    PHOS 2.0 07/12/2022 06:00 AM     PT/INR:    Lab Results   Component Value Date/Time    PROTIME 21.5 07/12/2022 06:00 AM    INR 2.0 07/12/2022 06:00 AM        PRO-BNP:   Lab Results   Component Value Date    PROBNP 41,368 (H) 07/06/2022    PROBNP 2,369 (H) 05/17/2022      ABGs:   Lab Results   Component Value Date/Time    PH 7.452 07/11/2022 05:07 AM    PO2 133.6 07/11/2022 05:07 AM    PCO2 40.0 07/11/2022 05:07 AM     Hemoglobin A1C: No components found for: HGBA1C    IMAGING:  Imaging tests were completed and reviewed and discussed radiology and care team involved and reveals   Echo Complete    Result Date: 7/7/2022  Findings   Left Ventricle  Micro-bubble contrast injected to enhance left ventricular visualization. Normal left ventricular size and systolic function. Ejection fraction is visually estimated at 70-75%. Indeterminate diastolic function. No regional wall motion abnormalities seen. Mild left ventricular concentric hypertrophy noted. Right Ventricle  Moderately dilated right ventricle. Right ventricle global systolic function is reduced. TAPSE 1.1 cm. Left Atrium  The left atrium is moderate-severely dilated. JUANY 48 ml/m2. The interatrial septum appears intact. Right Atrium  Mildly enlarged right atrium. Mitral Valve  Structurally normal mitral valve. No evidence of mitral valve stenosis. Physiologic mitral regurgitation. Tricuspid Valve  The tricuspid valve appears structurally normal.  Mild tricuspid regurgitation. RVSP is at least 60 mmHg. Aortic Valve  Individual aortic valve leaflets are not clearly visualized. No hemodynamically significant aortic stenosis is present. No evidence of aortic valve regurgitation. Pulmonic Valve  The pulmonic valve was not well visualized.   No evidence of pulmonic valve stenosis. No evidence of any pulmonic regurgitation. Pericardial Effusion  Prominent pericardial fat pad. No definitive evidence of pericardial effusion. Aorta  Aortic root dimension within normal limits. Miscellaneous  Dilated Inferior Vena Cava. Conclusions   Summary  Technically difficult study - limited visualization. Micro-bubble contrast injected to enhance left ventricular visualization. Normal left ventricular size and systolic function. Ejection fraction is visually estimated at 70-75%. Indeterminate diastolic function. No regional wall motion abnormalities seen. Mild left ventricular concentric hypertrophy noted. Moderately dilated right ventricle with reduced function. Biatrial dilation. Mild tricuspid regurgitation. RVSP is at least 60 mmHg. Prominent pericardial fat pad. No definitive evidence of pericardial effusion. Result Date: 7/5/2022  FINDINGS: Lower Chest: See the CT report of the chest dated the same date for full details. Organs: The liver is grossly unremarkable. The spleen is unremarkable. Gallbladder is been surgically removed. The pancreas reveals some mild fatty replacement. Both adrenal glands are within normal limits. The kidneys are unremarkable in appearance. GI/Bowel: Stomach is within normal limits. No mucosal abnormality. Stool seen scattered diffusely throughout the colon. There is no wall thickening. No mucosal abnormality or distension of the small bowel. Pelvis: Pelvic organs reveal mild wall thickening of the bladder with incomplete distension. The uterus is grossly unremarkable. Peritoneum/Retroperitoneum: There is no abdominal retroperitoneal lymphadenopathy. There is mild stranding identified in the root of the mesentery as well as surrounding the pancreas in which mild inflammation of the pancreas cannot be completely excluded. Correlation to clinical lab values is recommended.   No pelvic adenopathy with moderate atherosclerotic disease seen within the abdominal aorta and iliac vessels. Free fluid identified throughout the upper abdomen. Extensive vascular calcifications seen within the mesenteric vessels as well as the abdominal aorta. Bones/Soft Tissues: The bony structures reveal extensive degenerative changes seen within the spine and pelvis. Severe degenerative changes seen within the sacroiliac joints bilaterally right greater than left some sclerosis on the right to suggest prior healed sacroiliitis. There is extensive anasarca seen within the subcutaneous tissues. Increasing fluid within the abdomen when compared to the prior study. The anasarca is also increased in appearance of the prior examination. Mild stranding seen around the mesentery which correlation to clinical lab values is recommended to exclude possible pancreatitis or volume overload. Overall the appearance of the pancreas itself is grossly unremarkable. There is only mild stranding seen throughout the mesenteric root. CT HEAD WO CONTRAST  Result Date: 7/5/2022  No acute intracranial abnormality or significant change in the overall appearance of the brain. MRI would be useful if symptoms persist. RECOMMENDATIONS: Unavailable     CT CHEST WO CONTRAST    Result Date: 7/5/2022  FINDINGS: Mediastinum: The cardiac size is enlarged. There is a small pericardial effusion. Minimal probable gas in the right atrium is likely secondary to recent intravenous injection. The esophagus is normal.  No definite mediastinal masses or lymphadenopathy. Lungs/pleura: There is consolidation posterior right upper lobe and right middle lobe with linear density in the lingula and at the left posterior lung base. There is right lower lobe consolidation and moderate right pleural effusion. Upper Abdomen: There is a small volume of right upper quadrant ascites. There are surgical clips in the gallbladder fossa and right upper anterior abdominal wall. Soft Tissues/Bones:  There is mild induration of the subcutaneous fat. There is moderate lower thoracic spondylosis with slight levoconvex curvature of the thoracic spine. No definite lytic or blastic osseous lesions. Bilateral lung infiltrates with greatest consolidation right lower lobe probably secondary to atelectasis and or pneumonia. Moderate right pleural effusion. Cardiomegaly and small pericardial effusion. The pericardial effusion is new. Small volume right upper abdominal ascites along with anasarca. This is new. CT GUIDED CHEST TUBE  Result Date: 7/8/2022  [facility] After obtaining informed consent, following the routine sterile prep and drape and after the administration of local anesthesia a needle was inserted into the right pleural space . Serous fluid was aspirated. Subsequently a guidewire was passed through the needle. Subsequently the needle was removed and over the guidewire the tract was dilated. Following dilatation a locking loop catheter was placed. Post procedure CT scan reveals the tube to be in good position. Specimen was sent to the laboratory. The patient tolerated the procedure well. There were no complications. Prior to the procedure a timeout occurred at  1542 hours. The patient received 9 second  of  fluoroscopy. The patient received local anesthesia. The patient was monitored for 60 minutes by a registered nurse. Successful uncomplicated  right chest tube placement Recommendations: 1. Follow-up tube check in 2 weeks 2. Flush tube daily with 5 to 10 mL of sterile saline     MRI BRAIN WO CONTRAST  Result Date: 7/8/2022  FINDINGS: INTRACRANIAL STRUCTURES/VENTRICLES: There is no acute infarct. No mass effect, edema or hemorrhage is seen. Mild volume loss is seen in the cerebrum with mild chronic microvascular ischemic changes. No hydrocephalus or extra-axial fluid is seen. ORBITS: Prosthetic lenses are seen in the globes bilaterally. The orbits are otherwise grossly unremarkable.  SINUSES: Mild mucosal thickening in the paranasal sinuses. There is pooling of secretions in the nasopharynx. Moderate bilateral mastoid effusions. BONES/SOFT TISSUES: The bone marrow signal intensity appears normal. The soft tissues demonstrate no acute abnormality. 1.  No acute intracranial abnormality. 2. No gross epileptogenic lesion is identified. 3. Bilateral mastoid effusions, which may be due to eustachian tube dysfunction or otomastoiditis. RECOMMENDATIONS: Unavailable     US DUP LOWER EXTREMITIES BILATERAL VENOUS  Within the visualized vessels there is no evidence for deep venous thrombosis       Assessment: 1. Acute hypoxic hypercapnic respiratory failure 2/2 mod R pleural effusion, possible HAP vs acute decompensated  HFpEF exacerbation (EF 70-75%)  2. Pulmonary hypertension likely Type II 2/2 acute decompensated diastolic heart failure, in the setting of Hx SERENA  3. Moderate R pleural effusion, likely 2/2 HAP (Klebsiella pneumoniae) vs acute decompensated heart failure. D4 CT, 250 cc output in 24 hrs  4. HAP (Klebsiella pneumoniae on resp culture, light growth). D1 cefepime  5. Elevated pro-BNP, multifactorial, likely acute decompensated HFpEF, septic shock. 6. Elevated troponin likely 2/2 demand ischemia  7. Permanent atrial fibrillation, s/p DCCV (April and May 2022), now rate controlled. 8. SHANTANU Stage III on CKD multifactorial, pre-renal (hemodynamically mediated, DHN 2/2 diuretic use, poor oral intake); vs ATN 2/2 septic shock. 9. Hypernatremia likely 2/2 over diuresis  10. Metabolic alkalosis likely contraction alkalosis 2/2 diuresis, poor oral intake  11. Pancytopenia likely 2/2 BM suppression 2/2 chronic illness, meds (Zosyn)  12. Acute on chronic macrocytic anemia, multifactorial, likely 2/2 chronic disease, blood draws,  Less likely hemolysis (no schistocytes, haptoglobin wnl) s/p 1 unit PRBC 7/11, FOBT positive today, with pinkish tinge on chest tube output  13.  Thrombocytopenia,likely 2/2 #11, less likely HIT (not exposed to heparin products), DIC, hemolysis ruled out (work-up negative)  14. Prolonged INR, ? HFP wnl, no overt bleeding, apixaban and aspirin on hold. Given Vit K yesterday for INR 3.2>2 today  15. Low grade fever, multifactorial, ? meds (Precedex?), CLABSI?, no new leukocytosis, or hemodynamic instability   16. Hx of asthma  17. Hx of SERENA. Not using CPAP at home  18. Hx of HTN. -160`s  19. Hx of CAD  20. Hx of Type 2 DM with neuropathy. On Lantus 10 and Novolog SS at home  21. Hx of depression/anxiety. 22. Hx of restless leg syndrome  23. Hx of Parkinson's disease.  On Sinemet and ropinirole at home, which was discontinued upon discharge at [facility]:   -PT, INR, APTT daily  -urine electrolytes, urea and creatinine  -check pro-BNP  -POCT glucose AC & HS   -remove central line, send tip for gram stain and culture  -discontinue Precedex drip (can cause fever in 5-7%)  -continue amiodarone 200 mg daily PO  -increase metoprolol to 50 mg BID PO  -continue to hold aspirin and apixaban  -hold furosemide for now because of contraction alkalosis  -replacing magnesium, potassium and phosphorus  -continue cefepime IV  -continue nebulization treatment  -continue Lantus 5 units HS and LDSS   -given another vitamin K 10 mg PO today  -continue famotidine 10 mg PO daily for GI prophylaxis  -monitor chest tube output Qshift and record, will remove chest tube once out[put is less than or equal to 150 cc in 24 hrs        Maira Holland DO DO, MPH, [facility], [facility]  Professor of Internal Medicine  Pulmonary, Critical Care and Sleep Medicine

## 2022-07-12 NOTE — PROGRESS NOTES
[facility]  Internal Medicine Residency Program  Progress Note - House Team     Patient:  Isaac Hands 66 y.o. female MRN: [id number]     Date of Service: 7/12/2022     CC: Altered Mental Status     Days since admission: 7    Subjective     Overnight events: BP: 161/93. Metoprolol increased 25 to 50 mg BID    Patient is seen and examined this morning. Patient seems to be hallucinated/confused. She is responsive. She is not in respiratory distress. She denies cough, chest pain, dyspnea, abdominal pain, vomiting.      Objective     Physical Exam:  Vitals: /63   Pulse 72   Temp 100.4 °F (38 °C) (Bladder)   Resp 18   Ht 5' (1.524 m)   Wt 230 lb (104.3 kg)   SpO2 97%   BMI 44.92 kg/m²     I & O - 24hr:   Intake/Output Summary (Last 24 hours) at 7/12/2022 1440  Last data filed at 7/12/2022 1000  Gross per 24 hour   Intake 1687.86 ml   Output 1805 ml   Net -117.14 ml      General Appearance: Patient seems confused, responsive, alert  Lung: Mild crackles on both lung fields   Heart: regular rate and rhythm, S1, S2 normal, no murmur, click, rub or gallop  Abdomen: soft, non-tender; bowel sounds normal; no masses,  no organomegaly    Neurologic: Mental status: Alert, oriented  Ext: s/p toe amputation- wound care following   Subject  Pertinent Information & Imaging Studies, Consults   genesis  CBC:   Lab Results   Component Value Date/Time    WBC 4.2 07/12/2022 06:00 AM    RBC 2.75 07/12/2022 06:00 AM    HGB 7.3 07/12/2022 06:00 AM    HCT 24.5 07/12/2022 06:00 AM    MCV 89.1 07/12/2022 06:00 AM    MCH 26.5 07/12/2022 06:00 AM    MCHC 29.8 07/12/2022 06:00 AM    RDW 14.9 07/12/2022 06:00 AM    PLT 14 07/12/2022 06:00 AM    MPV NOT CALC 07/12/2022 06:00 AM     BMP:    Lab Results   Component Value Date/Time     07/12/2022 06:00 AM    K 3.6 07/12/2022 06:00 AM    K 5.0 07/06/2022 02:41 AM     07/12/2022 06:00 AM    CO2 32 07/12/2022 06:00 AM    BUN 45 07/12/2022 06:00 AM    LABALBU 4.1 07/12/2022 06:00 AM    CREATININE 1.3 07/12/2022 06:00 AM    CALCIUM 8.6 07/12/2022 06:00 AM    GFRAA 49 07/12/2022 06:00 AM    LABGLOM 40 07/12/2022 06:00 AM    GLUCOSE 165 07/12/2022 06:00 AM       IMAGING:   Imaging Studies:    CT ABDOMEN PELVIS WO CONTRAST Additional Contrast? None    Result Date: 7/5/2022  Increasing fluid within the abdomen when compared to the prior study. The anasarca is also increased in appearance of the prior examination. Mild stranding seen around the mesentery which correlation to clinical lab values is recommended to exclude possible pancreatitis or volume overload. Overall the appearance of the pancreas itself is grossly unremarkable. There is only mild stranding seen throughout the mesenteric root. CT HEAD WO CONTRAST    Result Date: 7/5/2022  No acute intracranial abnormality or significant change in the overall appearance of the brain. MRI would be useful if symptoms persist. RECOMMENDATIONS: Unavailable     CT CHEST WO CONTRAST    Result Date: 7/5/2022  Bilateral lung infiltrates with greatest consolidation right lower lobe probably secondary to atelectasis and or pneumonia. Moderate right pleural effusion. Cardiomegaly and small pericardial effusion. The pericardial effusion is new. Small volume right upper abdominal ascites along with anasarca. This is new. XR CHEST PORTABLE    Result Date: 7/12/2022  Grossly stable pulmonary opacities in the lower left lung, which may represent atelectasis or pneumonia. XR CHEST PORTABLE    Result Date: 7/11/2022  1. Patchy right perihilar and left lower lobe airspace disease 2. Status post removal of the endotracheal tube and NG tube 3. There is no pneumothorax. XR CHEST PORTABLE    Result Date: 7/10/2022  Lines and tubes are stable with no pneumothorax on the right.   Some improvement seen in the region of left lung base in the interval.     XR CHEST PORTABLE    Result Date: 7/9/2022  The evaluation is limited due to low lung volumes. No interval change compared to prior exam. Lines and tubes are in unchanged position. XR CHEST PORTABLE    Result Date: 7/8/2022  Catheter placed with significant right lung aeration improvement and no pneumothorax     XR CHEST PORTABLE    Result Date: 7/8/2022  1. There is no interval change in the bilateral perihilar and lower lobe airspace disease more prominent within the right lung. 2. Moderate size right pleural effusion. 3. There is no pneumothorax. XR CHEST PORTABLE    Result Date: 7/7/2022  Increasing infiltrate throughout the right lung persistent left basilar alveolar infiltrate is well. XR CHEST PORTABLE    Result Date: 7/6/2022  Adequately positioned right internal jugular central venous line. Otherwise, no significant change compared to prior exam glued in lines and tubes. XR CHEST PORTABLE    Result Date: 7/6/2022  1. No significant change. Cardiomegaly with right pleural effusion. 2.  Support tubes remain in appropriate position. XR CHEST PORTABLE    Result Date: 7/5/2022  Adequately positioned endotracheal tube. Nasogastric tube coursing below diaphragm. Otherwise, stable exam.     CT GUIDED CHEST TUBE    Result Date: 7/8/2022  Successful uncomplicated  right chest tube placement Recommendations: 1. Follow-up tube check in 2 weeks 2. Flush tube daily with 5 to 10 mL of sterile saline     XR ABDOMEN FOR NG/OG/NE TUBE PLACEMENT    Result Date: 7/8/2022  Catheter is in the stomach. XR ABDOMEN FOR NG/OG/NE TUBE PLACEMENT    Result Date: 7/5/2022  Catheter is in the proximal stomach. US RETROPERITONEAL COMPLETE    Result Date: 7/7/2022  1. Marked bilateral renal cortical thinning. Echogenic renal parenchyma. No hydronephrosis. 2.  A Diaz catheter appears to be decompressing the bladder. MRI BRAIN WO CONTRAST    Result Date: 7/8/2022  1. No acute intracranial abnormality. 2. No gross epileptogenic lesion is identified.  3. Bilateral mastoid effusions, which may be due to eustachian tube dysfunction or otomastoiditis. RECOMMENDATIONS: Unavailable     US DUP LOWER EXTREMITIES BILATERAL VENOUS    Result Date: 7/6/2022  Within the visualized vessels there is no evidence for deep venous thrombosis              Notable Cultures:      Blood cultures   Blood Culture, Routine   Date Value Ref Range Status   07/05/2022 5 Days no growth  Final     Respiratory cultures No results found for: RESPCULTURE   Gram Stain Result   Date Value Ref Range Status   07/08/2022 Refer to ordered Gram stain for results  Final     Urine   Creatinine Ur POCT   Date Value Ref Range Status   06/17/2019 50 mg/dl  Final     Urine Culture, Routine   Date Value Ref Range Status   07/05/2022 Growth not present  Final     Legionella No results found for: LABLEGI  C Diff PCR No results found for: CDIFPCR  Wound culture/abscess: No results for input(s): WNDABS in the last 72 hours. Tip culture:No results for input(s): CXCATHTIP in the last 72 hours. Antibiotic  Days  Day started                                  OXYGENATION: 3+; Nasal cannula    DIET: Adult diet; Dysphagia-Minced and moist; 5 carb choices        Resident's Assessment and Plan     Maximiliano Lugo is a 66 y.o. female with  has a past medical history of A-fib ([address] Acute on chronic congestive heart failure ([address] Anxiety, Asthma, CAD (coronary artery disease), Cancer ([address] Chronic kidney disease, Depression, Diabetes mellitus ([address] H/O mammogram, Hx MRSA infection, Hyperlipidemia, Hypertension, Lateral epicondylitis, SERENA on CPAP, Parkinson's disease ([address] and Tubal ligation status.   came here with CC   Chief Complaint   Patient presents with    Altered Mental Status     per ems family reports ams x 45 minutes, recent psych med changes and right toe amputation        SUMMARY [facility]: Steve Grandchild is a   13-year-old female worsening bilateral edema and shortness of breath more than 2 weeks    Days since admission: 7    Consults:   Pulmonology  Cardiology  Critical Care  Nephrology  Geriatric Medicine    Assessment and Plan:  Acute encephalopathy, multifactorial 2/2 shock, digoxin toxicity, uremia, hyperammonemia, recent change of medications; r/o other causes   - CT head negative for any acute abnormality  -Wean of Precedex initiated  -Monitor for clinical improvement  2. Acute Hypoxic Respiratory Failure s/p extubation   - Patient on nasal cannula  -3 litter of o2  -Monitor respiratory status  3. Shock, likely septic  -Vitals stable  -Continue Cefepime  4. Oropharyngeal dysphagia (moderate)  -Minced and moist consistency solid  -No straw  4. Elevated troponin likely 2/2 demand ischemia -troponin 57 -> 53; EKG showed no ischemic changes  4. Atrial fibrillation, on eliquis 5mg BID and metoprolol 50mg BID at home- eliquis on hold due to concerns for bleeding with severe thrombocytopenia   5. Bradycardia likely 2/2 digoxin toxicity in the setting of SHANTANU  -Resolved  6. SHANTANU Stage III on CKD - improving; Cr. 1.3<1.2; Bun 54<45  -Will monitor BMP  7. Hyperkaliemia  -3.8<3.7  -Resolved  8. Hx of depression/anxiety  - On Seroquel 25 mg   9. Hx of Parkinson's disease  - Carbidopa-Levodopa started  10. Hx HTN  -BP overnight 161/93  -Metoprolol increased 25<50  11. Hypernatremia  -Discontinue diuresis today  -Monitor BMP        Problems resolved during this admission:   1. Acute encephalopathy, multifactorial 2/2 septic shock, digoxin toxicity, uremia,resolved  2. Shock, likely septic (HAP), digoxin toxicity  3. Hyperkalemia 2/2 SHANTANU, resolved. 4. Lactic acidosis: resolved             PT/OT: Speech therapy    GI ppx: Pepcid 10 mg      Next of Kin/ POA:  Daughter    Code Status:   Full code    Disposition:Continue current medication      Tatiana Kapoor MD, PGY-1  Attending physician: Dr. Lenora Stoen       Senior Resident Addendum:  I have seen and examined the patient with the intern.  I have discussed the case, including pertinent history and exam findings with the intern. I agree with the assessment, plan and orders as documented above with the following additions:    Patient seen and examined today at bedside in no acute distress, remains on precedex drip. She continues to have some confusion. Concerns for some hallucinations overnight, likely ICU delirium. Seroquel and Sinemet have been recently resumed. Will continue to monitor response. She was also noted to have low-grade fevers overnight. Breath sounds noted to be improved compared to yesterday.     Acute encephalopathy, multifactorial 2/2 shock, digoxin toxicity, uremia, hyperammonemia, recent change of medications; r/o other causes - improving  Came in for AMS with hypotension not improved with fluids  Dig level 3.7  Ammonia 69  BUN 95 on presentation  CT head negative for any acute abnormality  Seizure-like activity  MRI brain negative for intracranial abnormality  EEG still pending  No recurrence of seizure-like activity  Shock, likely septic (unknown etiology, likely HAP) vs cardiogenic (pericardial effusion on CT) - resolved  Concerns for septic shock; although no WBC or fever but pt presented with hypotension, AMS; Procal 0.22, unclear source of infection likely pneumonia; respi cx growing Klebsiella  Concerns for cardiogenic; small pericardial effusion seen on imaging, possible acute exacerbation of HF  Echo showed EF 70-75%, indeterminate DD, normal LV size and function with no pericardial effusion noted  Elevated troponin likely 2/2 demand ischemia -troponin 57 -> 53; EKG showed no ischemic changes  Atrial fibrillation, on eliquis 5mg BID and metoprolol 50mg BID at home  Bradycardia likely 2/2 digoxin toxicity in the setting of SHANTANU - resolved  Pulmonary HTN likely Type II 2/2 acute decompensated heart failure  Acute hypoxic hypercapnic respiratory failure 2/2 pleural effusion, possible HAP vs pericardial effusion vs acute HFpEF exacerbation - improving  Pleural effusion as well as small pericardial effusion seen on imaging  proBNP 41,368  PE ruled out, US BLE negative for DVT  S/p IR pigtail catheter placement, 950cc fluid taken out; fluid analysis c/w transudative with fluid/serum LDH 0.59 and PF/serum protein 0.41  S/p extubation 7/10/22; currently on 3L  NC  Acute pancreatitis; lipase 148; CT A/P showed mild stranding around the mesentery concerning for pancreatitis versus volume overload, although overall appearance of pancreas itself is grossly unremarkable. SHANTANU Stage III on CKD - improving - baseline Crea 0.8-0.9, sCr 3.3 on presentation -> 1.8 today; FEUrea: 6.5% -> prerenal likely multifactorial, prerenal (cardiorenal syndrome, dehydration) vs ATN in the setting of septic shock  Hyperkalemia - resolved; K 5.5 -> 3.3  HAGMA 2/2 lactic acidosis, uremia AG 15 -> 23; lactic acid and uremia elevated  Elevated INR  Thrombocytopenia likely 2/2 sepsis; r/o other cause  Hx of Type 2 DM with neuropathy, lantus 10 and sliding scale at home  Hx of asthma, on montelukast 10mg daily  Hx of SERENA  Hx of HTN   Hx of CAD  Hx of HFpEF (EF  60% on echo in 4/2022); on furosemide 20mg daily and metoprolol 50mg BID  Hx of hypothyroidism   Hx of depression/anxiety, on seroquel 25mg in AM and 50mg in PM; divalproex 250mg BID  Hx of restless leg syndrome, on ropinirole 1mg TID at home  Hx of Parkinson's disease, was previously on carbi-dopa; discontinued upon discharge at [facility]:  Care per ICU team  Weaning sedation, monitor mentation  Follow EEG results  Depakote for agitation if needed per neurology recs  Monitor BP  Midodrine 2.5mg TID PRN  Continue amiodarone and lopressor 50 mg BID for A fib RVR  Follow thoracentesis results  Continue chest tube  Continue Cefepime  Diuresis per nephro.  Holding diuresis for now due to hypernatremia and contraction alkalosis  Continue on lantus 5u and LDSS  Monitor BG q4h  Hypoglycemia protocol in place  Eliquis and aspirin on hold due to thrombocytopenia  Follow FOBT  Heme/Onc consulted, appreciate recommendations  Seroquel 25mg BID resumed today  Continue Carbidopa-levodopa TID  Palliative consulted for goals of care  Appreciate input from specialties       Vesta Primrose, MD PGY-2  7/12/2022  6:38 PM      NOTE: This report was transcribed using voice recognition software. Every effort was made to ensure accuracy; however, inadvertent computerized transcription errors may be present.

## 2022-07-12 NOTE — CONSULTS
Palliative Care Department  [phone]  Palliative Care Initial Consult  Provider Nirmala Lara, APRN - CNP      PATIENT: Baldemar Ca  : 1950  MRN: [id number]  ADMISSION DATE: 2022 12:25 PM  Referring Provider: Jacob Foss MD    Palliative Medicine was consulted on hospital day 7 for assistance with Goals of care. HPI:     Vikki Dumont is a 66 y.o. y/o female with a history of A. fib, acute on chronic heart failure, anxiety, asthma, coronary artery disease, cancer, chronic kidney disease, depression, diabetes mellitus, hyperlipidemia, hypertension, OSHA on CPAP, Parkinson's disease, who presented to [facility]) on 2022 with altered mental status. The emergency room patient was found to be hypoxic, bradycardic, hypotensive. She has recently psych medications adjustment and also had right second toe  amputation on 2022.  2020 two 2D echo shows EF 75%. She was referred to the ICU where decision were made for patient to be intubated. Patient was extubated on 7/10/2022. Patient currently is on 3 to nasal cannula, with plan to be transferred out of the ICU. Palliative medicine consulted to discuss goals of care. ASSESSMENT/PLAN:     Pertinent Hospital Diagnoses      Shock   Uremia   Poisoning by digoxin   Acute pancreatitis      Palliative Care Encounter / Counseling Regarding Goals of Care  Please see detailed goals of care discussion as below   At this time, Baldemar Ca, Does have capacity for medical decision-making. Capacity is time limited and situation/question specific   Outcome of goals of care meeting:  o Continue with current medical treatment  o Patient wishes to remain a full code  o Patient is, discussed with her children's about long-term vent support.    Code status Full Code    Advanced Directives: no POA or living will in [facility]   Surrogate/Legal NOK:  o Phu Chavezman (Child) [phone]  o Smiley Rubin (Child) [phone]    Spiritual assessment: no spiritual distress identified  Bereavement and grief: to be determined  Referrals to: none today    Thank you for the opportunity to participate in the care of Gayatri Avina. RASHARD Caba CNP  Palliative Medicine     SUBJECTIVE:     Details of Conversation:    Chart was reviewed. Saw patient at the bedside. Patient was alert and oriented and she was able to participate in a meaningful conversation. We discussed goal of care, patient stated that she does not remember what happened at home that brought her to the hospital, however she does remember being intubated. She wishes to continue with current medical treatment, she was emotional, and tearful throughout this conversation. We discussed CODE STATUS. Patient stated  she wants to continue to remain a full code. We also discussed scenarios,  such as possibility of reintubation, patient reports in the event of having to be reintubated, she will agreed to it. We discussed long-term vent support, she is not sure if she would want that, however she is going to discussed with her children's regarding long-term vent support. Comfort support was provided. We will continue to follow.       Prognosis: Guarded    OBJECTIVE:     /63   Pulse 72   Temp 100.4 °F (38 °C) (Bladder)   Resp 18   Ht 5' (1.524 m)   Wt 230 lb (104.3 kg)   SpO2 97%   BMI 44.92 kg/m²     Physical Examination:  Gen: elderly, thin, NAD, awake, alert   HEENT: normocephalic, atraumatic, PERRL, EOMI,   Neck: trachea midline, no JVD  Lungs: respirations easy and not labored,   Heart: regular rate and rhythm, distant heart tones,   Abdomen: normoactive bowel sounds, soft, non-tender  Extremities: edema, moving all extremities    Skin: warm, dry without rashes, lesions, bruising  Neuro: awake, alert, oriented x 3, follows commands, no gross neurologic deficit    Objective data reviewed: labs, images, records, medication use, vitals and chart    Time/Communication  Greater than 50% of time spent, total 50 minutes in counseling and coordination of care at the bedside regarding goals of care. Thank you for allowing Palliative Medicine to participate in the care of Chalo Brown. Note: This report was completed using [employer] voiced recognition software. Every effort has been made to ensure accuracy; however, inadvertent computerized transcription errors may be present.

## 2022-07-12 NOTE — PLAN OF CARE
Problem: Chronic Conditions and Co-morbidities  Goal: Patient's chronic conditions and co-morbidity symptoms are monitored and maintained or improved  Outcome: Progressing     Problem: Discharge Planning  Goal: Discharge to home or other facility with appropriate resources  Outcome: Progressing     Problem: Pain  Goal: Verbalizes/displays adequate comfort level or baseline comfort level  Outcome: Progressing     Problem: Skin/Tissue Integrity  Goal: Absence of new skin breakdown  Description: 1. Monitor for areas of redness and/or skin breakdown  2. Assess vascular access sites hourly  3. Every 4-6 hours minimum:  Change oxygen saturation probe site  4. Every 4-6 hours:  If on nasal continuous positive airway pressure, respiratory therapy assess nares and determine need for appliance change or resting period.   Outcome: Progressing     Problem: ABCDS Injury Assessment  Goal: Absence of physical injury  Outcome: Progressing     Problem: Safety - Adult  Goal: Free from fall injury  Outcome: Progressing     Problem: Nutrition Deficit:  Goal: Optimize nutritional status  Outcome: Progressing  Flowsheets (Taken 7/11/2022 1251 by Petrona Lopez, RD, LD)  Nutrient intake appropriate for improving, restoring, or maintaining nutritional needs: Assess nutritional status and recommend course of action

## 2022-07-12 NOTE — PROGRESS NOTES
Pt seen for dysphagia x2 . Results and recommendations of swallow study reviewed with patient. Tolerating current diet without noted difficulty. Laryngeal elevation and tongue base retraction exercises completed fair-poor. Pt was instructed to continue exercises independently throughout the day.   Will continue

## 2022-07-12 NOTE — PROGRESS NOTES
Subjective:  Chart reviewed, discussed withresident  Patient seen at bedside  Level consciousness has improved, she asked for a drink of coffee and states she is thirsty  Denies other complaints    Objective:    BP (!) 99/57   Pulse 88   Temp 100.2 °F (37.9 °C) (Bladder)   Resp 15   Ht 5' (1.524 m)   Wt 230 lb (104.3 kg)   SpO2 94%   BMI 44.92 kg/m²     General: NAD, alert   HEENT: normocephalic/atraumatic, mucosa dry without ulcerations,thrush or mucositis, sclera anicteric, conjuntiva pink  NECK: supple, trachea midline  Heart:  IRRR, no murmurs, gallops, or rubs.   Lungs: O2 via nasal cannula, respirations nonlabored and poor respiratory effort difficult to assess with body habitus anteriorly, chest tube right  Abd: Fecal management system, BS present, nontender, nondistended, no masses  Extrem: SCDs, heel lifts   Diaz  Skin: Scattered ecchymoses on arms bilaterally, Intact, no petechia or purpura    CBC with Differential:    Lab Results   Component Value Date/Time    WBC 4.2 07/12/2022 06:00 AM    RBC 2.75 07/12/2022 06:00 AM    HGB 7.3 07/12/2022 06:00 AM    HCT 24.5 07/12/2022 06:00 AM    PLT 14 07/12/2022 06:00 AM    MCV 89.1 07/12/2022 06:00 AM    MCH 26.5 07/12/2022 06:00 AM    MCHC 29.8 07/12/2022 06:00 AM    RDW 14.9 07/12/2022 06:00 AM    NRBC 0.0 03/15/2019 09:10 AM    SEGSPCT 74 08/20/2013 10:45 AM    LYMPHOPCT 22.6 07/12/2022 06:00 AM    MONOPCT 9.3 07/12/2022 06:00 AM    MYELOPCT 0.9 07/09/2022 12:30 AM    EOSPCT 1 06/16/2017 12:00 AM    BASOPCT 0.2 07/12/2022 06:00 AM    MONOSABS 0.39 07/12/2022 06:00 AM    LYMPHSABS 0.95 07/12/2022 06:00 AM    EOSABS 0.02 07/12/2022 06:00 AM    BASOSABS 0.01 07/12/2022 06:00 AM     CMP:    Lab Results   Component Value Date/Time     07/12/2022 06:00 AM    K 3.6 07/12/2022 06:00 AM    K 5.0 07/06/2022 02:41 AM     07/12/2022 06:00 AM    CO2 32 07/12/2022 06:00 AM    BUN 45 07/12/2022 06:00 AM    CREATININE 1.3 07/12/2022 06:00 AM    GFRAA 49 07/12/2022 06:00 AM    LABGLOM 40 07/12/2022 06:00 AM    GLUCOSE 165 07/12/2022 06:00 AM    PROT 5.7 07/12/2022 06:00 AM    LABALBU 4.1 07/12/2022 06:00 AM    CALCIUM 8.6 07/12/2022 06:00 AM    BILITOT 1.0 07/12/2022 06:00 AM    ALKPHOS 68 07/12/2022 06:00 AM    AST 13 07/12/2022 06:00 AM    ALT <5 07/12/2022 06:00 AM          Current Facility-Administered Medications:     QUEtiapine (SEROQUEL) tablet 25 mg, 25 mg, Oral, BID, Tobie Castleman, MD, 25 mg at 07/12/22 0837    dextrose 5 % solution, , IntraVENous, Continuous, Tobie Castleman, MD, Last Rate: 100 mL/hr at 07/12/22 1249, New Bag at 07/12/22 1249    metoprolol tartrate (LOPRESSOR) tablet 50 mg, 50 mg, Oral, BID, Tobie Castleman, MD, 50 mg at 07/12/22 0836    labetalol (NORMODYNE;TRANDATE) injection 10 mg, 10 mg, IntraVENous, Q6H PRN, Brooklyn Clancy MD, 10 mg at 07/11/22 0044    hydrALAZINE (APRESOLINE) injection 10 mg, 10 mg, IntraVENous, Q6H PRN, Brooklyn Clancy MD, 10 mg at 07/11/22 0442    famotidine (PEPCID) tablet 10 mg, 10 mg, Oral, Daily, Tobie Castleman, MD, 10 mg at 07/12/22 0664    amiodarone (CORDARONE) tablet 200 mg, 200 mg, Oral, Daily, Tobie Castleman, MD, 200 mg at 07/12/22 0835    insulin lispro (HUMALOG) injection vial 0-6 Units, 0-6 Units, SubCUTAneous, 4x Daily AC & HS, Tobie Castleman, MD, 1 Units at 07/12/22 1110    carbidopa-levodopa (SINEMET CR)  MG per extended release tablet 2 tablet, 2 tablet, Oral, TID, Tobie Castleman, MD, 2 tablet at 07/12/22 1448    cefepime (MAXIPIME) 2000 mg IVPB minibag, 2,000 mg, IntraVENous, Q12H, Scar Adame MD, Last Rate: 12.5 mL/hr at 07/12/22 1507, 2,000 mg at 07/12/22 1507    ondansetron (ZOFRAN) injection 4 mg, 4 mg, IntraVENous, Q6H PRN, Tobie Castleman, MD, 4 mg at 07/11/22 1743    midodrine (PROAMATINE) tablet 2.5 mg, 2.5 mg, Orogastric, TID PRN, MD Ashly Pedersen [facility] [place] by provider] furosemide (LASIX) injection 40 mg, 40 mg, IntraVENous, BID, Chrissie Mina Martínez Arguello MD, 40 mg at 07/11/22 1743    midazolam PF (VERSED) injection 0.5 mg, 0.5 mg, IntraVENous, Q2H PRN, Sara Sanders MD, 0.5 mg at 07/10/22 0055    budesonide (PULMICORT) nebulizer suspension 500 mcg, 0.5 mg, Nebulization, BID, Sara Sanders MD, 500 mcg at 07/12/22 0854    levalbuterol (XOPENEX) nebulization 0.63 mg, 0.63 mg, Nebulization, Q4H PRN, Sara Sanders MD    insulin [facility]) injection vial 5 Units, 5 Units, SubCUTAneous, Nightly, Sara Sanders MD, 5 Units at 07/11/22 2016    ipratropium-albuterol (Ahmad Elm) nebulizer solution 1 ampule, 1 ampule, Inhalation, Q4H WA, Regulo Doty MD, 1 ampule at 07/12/22 1230    [Held by provider] aspirin chewable tablet 81 mg, 81 mg, Oral, Daily, Vijaya Stein MD, 81 mg at 07/07/22 5440    polyethylene glycol (GLYCOLAX) packet 17 g, 17 g, Oral, BID, Vijaya Stein MD, 17 g at 07/11/22 2016    [Held by provider] apixaban (ELIQUIS) tablet 5 mg, 5 mg, Oral, BID, Regulo Doty MD, 5 mg at 07/09/22 2021    atorvastatin (LIPITOR) tablet 20 mg, 20 mg, Oral, Daily, Regulo Doty MD, 20 mg at 07/12/22 0835    sodium chloride flush 0.9 % injection 5-40 mL, 5-40 mL, IntraVENous, 2 times per day, Regulo Doty MD, 10 mL at 07/12/22 0839    sodium chloride flush 0.9 % injection 5-40 mL, 5-40 mL, IntraVENous, PRN, Regulo Doty MD, 10 mL at 07/10/22 1427    0.9 % sodium chloride infusion, , IntraVENous, PRN, Regulo Doty MD, Last Rate: 10 mL/hr at 07/12/22 0229, New Bag at 07/12/22 0229    acetaminophen (TYLENOL) tablet 650 mg, 650 mg, Oral, Q6H PRN, 650 mg at 07/12/22 0835 **OR** acetaminophen (TYLENOL) suppository 650 mg, 650 mg, Rectal, Q6H PRN, Regulo Doty MD    glucose chewable tablet 16 g, 4 tablet, Oral, PRN, Regulo Doty MD    dextrose bolus 10% 125 mL, 125 mL, IntraVENous, PRN **OR** dextrose bolus 10% 250 mL, 250 mL, IntraVENous, PRN, Jean Pierre Lambert MD    glucagon (rDNA) injection 1 mg, 1 mg, IntraMUSCular, PRN, Jean Pierre Lambert MD    dextrose 5 % solution, 100 mL/hr, IntraVENous, PRN, Jean Pierre Lambert MD    XR CHEST PORTABLE   Final Result   Grossly stable pulmonary opacities in the lower left lung, which may   represent atelectasis or pneumonia. XR CHEST PORTABLE   Final Result   1. Patchy right perihilar and left lower lobe airspace disease   2. Status post removal of the endotracheal tube and NG tube      3. There is no pneumothorax. XR CHEST PORTABLE   Final Result   Lines and tubes are stable with no pneumothorax on the right. Some   improvement seen in the region of left lung base in the interval.         XR CHEST PORTABLE   Final Result   The evaluation is limited due to low lung volumes. No interval change compared to prior exam.      Lines and tubes are in unchanged position. XR ABDOMEN FOR NG/OG/NE TUBE PLACEMENT   Final Result   Catheter is in the stomach. XR CHEST PORTABLE   Final Result   Catheter placed with significant right lung aeration improvement and no   pneumothorax         MRI BRAIN WO CONTRAST   Final Result   1. No acute intracranial abnormality. 2. No gross epileptogenic lesion is identified. 3. Bilateral mastoid effusions, which may be due to eustachian tube   dysfunction or otomastoiditis. RECOMMENDATIONS:   Unavailable         CT GUIDED CHEST TUBE   Final Result   Successful uncomplicated  right chest tube placement      Recommendations:   1. Follow-up tube check in 2 weeks   2. Flush tube daily with 5 to 10 mL of sterile saline            XR CHEST PORTABLE   Final Result   1. There is no interval change in the bilateral perihilar and lower lobe   airspace disease more prominent within the right lung. 2. Moderate size right pleural effusion. 3. There is no pneumothorax.          US RETROPERITONEAL COMPLETE   Final Result 1.  Marked bilateral renal cortical thinning. Echogenic renal parenchyma. No hydronephrosis. 2.  A Diaz catheter appears to be decompressing the bladder. XR CHEST PORTABLE   Final Result   Increasing infiltrate throughout the right lung persistent left basilar   alveolar infiltrate is well. XR CHEST PORTABLE   Final Result   Adequately positioned right internal jugular central venous line. Otherwise,   no significant change compared to prior exam glued in lines and tubes. US DUP LOWER EXTREMITIES BILATERAL VENOUS   Final Result   Within the visualized vessels there is no evidence for deep venous   thrombosis               XR CHEST PORTABLE   Final Result   1. No significant change. Cardiomegaly with right pleural effusion. 2.  Support tubes remain in appropriate position. CT HEAD WO CONTRAST   Final Result   No acute intracranial abnormality or significant change in the overall   appearance of the brain. MRI would be useful if symptoms persist.      RECOMMENDATIONS:   Unavailable         XR ABDOMEN FOR NG/OG/NE TUBE PLACEMENT   Final Result   Catheter is in the proximal stomach. XR CHEST PORTABLE   Final Result   Adequately positioned endotracheal tube. Nasogastric tube coursing below   diaphragm. Otherwise, stable exam.         CT ABDOMEN PELVIS WO CONTRAST Additional Contrast? None   Final Result   Increasing fluid within the abdomen when compared to the prior study. The   anasarca is also increased in appearance of the prior examination. Mild stranding seen around the mesentery which correlation to clinical lab   values is recommended to exclude possible pancreatitis or volume overload. Overall the appearance of the pancreas itself is grossly unremarkable. There   is only mild stranding seen throughout the mesenteric root.          CT CHEST WO CONTRAST   Final Result   Bilateral lung infiltrates with greatest consolidation right lower lobe probably secondary to atelectasis and or pneumonia. Moderate right pleural effusion. Cardiomegaly and small pericardial effusion. The pericardial effusion is new. Small volume right upper abdominal ascites along with anasarca. This is new. XR CHEST PORTABLE   Final Result   New opacity on the right which may relate to pleural effusion with adjacent   atelectasis and or infiltrate. Cardiomegaly and pulmonary vascular   congestion implying elevated left heart filling pressures. 67 yo female with a histroy of stage I, T1 N0, ER positive, HER-2/samira negative ductal cancer of the right breast, status post resection April 2005 followed by radiation therapy, completed October 2005. She then completed 5 years on Arimidex in August 2010. Iron deficiency anemia requiring parental iron in the past.  Last seen in the office 5/7/2018. Admitted to the ICU with septic shock, acute respiratory failure requiring intubation and chest tube placement. Progressive worsening in CBC during admission now with pancytopenia. A/P:  Pancytopenia likely secondary to acute illness with multiple underlying comorbidities  -leukopenia resolving  -labs not consistent with DIC or hemolysis  -unlikely HIT, I don't see heparin given ordered eliquis but not been getting  -recommend hold eliquis with thrombocytopenia  -defer transfusion orders to ICU team, recommendations transfusions ot maintain Hgb > 7.5 and plt > 10k unless active bleeding identified, >30K  -would recommend checking FOBT  -reticulocyte index very low, WBC improving, history of iron deficiency and CKD, could consider bone marrow in the future if patient doesn't return to baseline       Thank you for allowing us to participate in the care of Asaf Fleming. Mateo Best PA-C  [phone]    Electronically signed by ERIC Hodges on 7/12/2022 at 4:39 PM    Note: This report was completed using [employer] voiced recognition software. Every effort has been made to ensure accuracy; however, inadvertent computerized transcription errors may be present.

## 2022-07-12 NOTE — PROGRESS NOTES
Plts 14 today    NO signs of bleeding currently     Hypernatremia   Discontinue diuresis today   Monitor Sodium levels     HTN- B-blocker has been increased   Continue to monitor BP readings     Atrial Fibrillation    Holding anticoagulation     Hx of Parkinson's Disease on presentation     Lactic Acidosis- resolved     Disposition- continue ICU management     Remainder of medical problems as per resident note.       Fartun Najera MD, Rh[facility] Quinones   Internal Medicine Residency Faculty

## 2022-07-12 NOTE — ADT AUTH CERT
Utilization Reviews         Sepsis and Other Febrile Illness, without Focal Infection - Care Day 7 (7/11/2022) by Estela Juarez RN       Review Status Review Entered   Completed 7/12/2022 13:33      Criteria Review      Care Day: 7 Care Date: 7/11/2022 Level of Care: ICU    Guideline Day 2    Level Of Care    (X) ICU or floor    7/12/2022 1:33 PM EDT by Miriam Sutton      icu    Clinical Status    ( ) * Hemodynamic stability    7/12/2022 1:33 PM EDT by Miriam Sutton      51/39    ( ) * Hypoxemia absent    7/12/2022 1:33 PM EDT by Miriam Sutton      o2 4 l Nc    ( ) * Tachypnea absent    7/12/2022 1:33 PM EDT by Miriam Sutton      34 rr    Activity    ( ) Activity as tolerated    7/12/2022 1:33 PM EDT by Miriam Sutton      bedrest    Routes    (X) Parenteral or oral medications    7/12/2022 1:33 PM EDT by Miriam Sutton      iv albumin 25 g bid-- dc iv zosyn and iv cordarone  iv maxipime 2 g bid- precedex 1 g iv nss 250 cc at 2.65 to 39.6 ml hr- iv apresoline 10mg  x1  iv trandate 10 mg x1-  iv pepcid 10 mg x1 then dc  iv lasix 40 mg bid  dc iv lopressor after 2 doses today    (X) Diet as tolerated    7/12/2022 1:33 PM EDT by Miriam Sutton      dyphagia low chol low fat hi fiber 2 gm na    Interventions    (X) WBC    7/12/2022 1:33 PM EDT by Miriam Sutton      yes    Medications    (X) Possible antimicrobial treatment    7/12/2022 1:33 PM EDT by Miriam Sutton      see above box    (X) Possible DVT prophylaxis    7/12/2022 1:33 PM EDT by Steve bailey and asa on hold    * Milestone   Additional Notes   DATE: 7/11 MICU- o2 4 L nc  art line chest tube         Pertinent Updates:LPM/NC. She was extubated yesterday and placed on AVAPS overnight. She still has cough, not able to expectorate sputum. She denies headache, lightheadedness, chest pain, dyspnea, abdominal pain, nausea, vomiting. She wants water but she failed her bedside swallow yesterday.  Currently waiting for SLP paula. She also desires to get up and sit on a chair. Chest tube intact to water seal, had 250 cc output in 24 hrs. No air leak noted. 24h change: Extubated yesterday 7/10/22. Referred to Hema-Onc yesterday for thrombocytopenia      100 (37.8) 34 86 51/39 - 99 on 4 lnc            Abnl/Pertinent Labs/Radiology/Diagnostic Studies:   Cxr- Impression:       1. Patchy right perihilar and left lower lobe airspace disease    2.  Status post removal of the endotracheal tube and NG tube       3.  There is no pneumothorax.        Bs 189   pH, Blood Gas 7.452      PCO2 40.0 mmHg      PO2 133.6 mmHg      HCO3 27.3 mmol/L      B.E. 3.1 mmol/L      O2 Sat 98.8 %      PO2/FIO2 3.34 mmHg/%      AaDO2 95.6 mmHg      RI(T) 0.72     O2Hb 97.2 %      COHb 0.8 %      MetHb 0.3 %      HHb 1.7 %      tHb (est) 8.7 g/dL      Mode AVAPS     FIO2 40.0 %      Rr Mechanical 18.0 b/min      Vt Mechanical 400.0 mL      Peep/Cpap 8.0 cmH2O       Na 147   Protime 35.0 sec      INR 3.2      Glucose 181 mg/dL      BUN 54 mg/dL      CREATININE 1.3       Phos 2.1   WBC 3.5 E9/L      RBC 2.90 E12/L      Hemoglobin 7.8 g/dL      Hematocrit 25.1 %      MCV 86.6 fL      MCH 26.9 pg      MCHC 31.1 %      RDW 14.8 fL      Platelets 13 R4/W      MPV 11.4 fL      Neutrophils % 78.3 %      Lymphocytes % 20.0 %      Monocytes % 1.7 %      Eosinophils % 0.3 %      Basophils % 0.0 %      Neutrophils Absolute 2.73 E9/L      Lymphocytes Absolute 0.70 E9/L      Monocytes Absolute 0.07 E9/L      Eosinophils Absolute 0.00 E9/L             Physical Exam:   · General Appearance:  Alert, awake and oriented, not in respiratory distress   · Neck: no adenopathy, no carotid bruit, no JVD, supple, symmetrical, trachea midline and thyroid not enlarged, symmetric, no tenderness/mass/nodules   · Lung: (+) crackles and rhonchi on B upper lung fields, no wheezes noted,  (+) chest tube R lung to water seal, no leak noted, dressing C/D/I   · Heart: normal rate to tachycardic,  irregularly, irregular rhythm,no murmur, click, rub or gallop   · Abdomen: soft, non-tender; bowel sounds normal; no masses,  no organomegaly   · Extremities:  extremities normal, atraumatic, (+) 3+ pitting B LES edema, PCDs in place   · Musculoskeletal: No joint swelling, no muscle tenderness. ROM normal in all joints of extremities. Neurologic: Mental status: alert, awake and oriented, 5/5 motor strength, no numbness or tingling   Lines         site day    Art line   R Radial 7/6   TLC R IJ 7/6            MD Consults/Assessments & Plans:    PLAN:   -CXR daily   -CMP daily, BMP, Mg, Phos q12hrs   -PT, INR, APTT daily   -FOBT   -POCT glucose q4hrs, change to [facility] & HS once with diet   -wean Precedex drip   -continue amiodarone drip while NPO   -continue metoprolol 5 mg IV q6hrs while NPO   -holding aspirin and apixaban for now for thrombocytopenia   -continue albumin and furosemide IV BID   -continue piperacillin-tazobactam, last dose in AM to complete 7 day-course   -continue Lantus 5 units HS and LDSS    -continue famotidine 10 mg IV daily( renally dosed)   -monitor chest tube output Qshift and record, will remove chest tube once out[put is less than or equal to 150 cc in 24 hrs   -avoid NSAIDs and other nephrotoxic agents   -continue to monitor renal status   -SLP evaluation   -change IV meds to PO once with diet   -follow Hema Onc recs   -monitor neuro status and hemodynamics   -strict I & O   -keep HOB 30-45 degrees       PT/OT:    DVT/GI prophylaxis: PCDs/famotidine   Disposition: continue current mgt      Medications:iv albumin 25 g bid-- dc iv zosyn and iv cordarone   iv maxipime 2 g bid- precedex 1 g iv nss 250 cc at 2.65 to 39.6 ml hr- iv apresoline 10mg  x1  iv trandate 10 mg x1-   iv pepcid 10 mg x1 then dc   iv lasix 40 mg bid   dc iv lopressor after 2 doses today   Meds per ng  tube   nebs         Orders: MICU   O2 4 L nc   Slp eval   Vs per icu    Qd labs   Chest tube   Art line         PT/OT/SLP/CM Assessments or Notes: DYSPHAGIA DIAGNOSIS:   Clinical indicators of moderate oropharyngeal phase dysphagia                               DIET RECOMMENDATIONS:  Minced and moist consistency solids (IDDSI level 5) with  thin liquids NO STRAW (IDDSI level 0), MEDICATION ADMINISTRATION and Administer medication crushed, as able, with pudding/applesauce                   FEEDING RECOMMENDATIONS:                            Assistance level:  Full assistance is needed during all oral intake                               Compensatory strategies recommended: Small bites/sips, Alternate solids and liquids, Check for oral pocketing and No straw            Sepsis and Other Febrile Illness, without Focal Infection - Care Day 6 (7/10/2022) by Emerald Shelley RN       Review Status Review Entered   Completed 7/12/2022 11:50      Criteria Review      Care Day: 6 Care Date: 7/10/2022 Level of Care: ICU    Guideline Day 2    Level Of Care    (X) ICU or floor    7/12/2022 11:50 AM EDT by Margot Calderon      ICU    Clinical Status    ( ) * Hemodynamic stability    7/12/2022 11:50 AM EDT by Zev Walton on vent 40% fio2    ( ) * Hypoxemia absent    7/12/2022 11:50 AM EDT by Zev Walton on vent 40% fio2    ( ) * Tachypnea absent    7/12/2022 11:50 AM EDT by Margot Calderon      [id number] vent 40% fio2    Activity    ( ) Activity as tolerated    7/12/2022 11:50 AM EDT by Margot Calderon      on vent 40% fio2    Routes    (X) Parenteral or oral medications    7/12/2022 11:50 AM EDT by Margot Calderon      iv albumin 25 g bid- iv pepcid 10 mg qd- iv lasix 40 mg bid  iv mag 1 g x1- iv lopressor 5mg qid- iv zosyn 4.5 g bid  kcl 20 meq iv x1- iv cordarone 450 mg  at 33.3 hr infusion  iv precedex 1000 mcg in nss 250 cc at 2.65 to 39.68 mlhr- iv versed x1    ( ) Diet as tolerated    7/12/2022 11:50 AM EDT by Margot Calderon      npo    Interventions    (X) WBC    7/12/2022 11:50 AM EDT by Margot Calderon      yes    Medications    (X) Possible antimicrobial treatment    7/12/2022 11:50 AM EDT by Chandler Cordero see other box    (X) Possible DVT prophylaxis    7/12/2022 11:50 AM EDT by Silvana bailey- asa    * Milestone   Additional Notes   DATE: 7/10 MICU  ett/vent on 40% fio2 art line- chest tube   Extubated to 3 L in kadi         Pertinent Updates:Currently she remains in ICU intubated--on precedex now, offfentanyl; Levophed discontinued around 840 Saturday morning       This morning patient tracks examiner around bed, follows some simple commands no longer on fentanyl.       EEG was done and pending interpretation although suspicion of seizures is much much lower now. Nubia Dahl has been no seizure activity      Overnight, no overnight events noted.  She continues have serosanguineous drain on chest tube, total of 260cc for that last 24hours         Vitals:100 temp    99.1 (37.3) 14 76 - 165/77 Semi fowlers 40 - 100 Ventilator           Abnl/Pertinent Labs/Radiology/Diagnostic Studies:   Cxr-Portable chest reveals lines and tubes to be in satisfactory position. Nubia Dahl    is a chest tube identified at the right lung base.  The lung volumes remain    low.  Some improvement seen in the region of the left lung base in the    interval.  Heart remains enlarged.  Degenerative changes seen within the    spine.      Impression:       Lines and tubes are stable with no pneumothorax on the right.  Some    improvement seen in the region of left lung base in the interval.    Source: Blood Arterial      pH, Blood Gas 7.386     PCO2 44.9 mmHg      PO2 219.2 mmHg      HCO3 26.3 mmol/L      B.E. 1.1 mmol/L      O2 Sat 99.4 %      O2Hb 97.8 %      COHb 0.6 %      MetHb 0.6 %      HHb 1.0 %      tHb (est) 7.4 g/dL      Mode NC- 3 L     Comment POST EXTUBATION      Bs 180   WBC 3.0 E9/L      RBC 2.71 E12/L      Hemoglobin 6.9 g/dL      Hematocrit 23.2 %      MCV 85.6 fL      MCH 25.5 pg      MCHC 29.7 %      RDW 15.2 fL      Platelets 13 Q7/B       Updated: 07/10/22 1042      R (Reaction Time) 9.0 min      K (Clotting Time) 5.1 min      Angle (Clot Strength) 44.4 degree      MA (Max Amplitude) 39.0 mm      G-TEG 3.2 K d/sc      EPL-TEG 0.0 %      LY30 (Fibrinolysis) 0.0 %          Hemoglobin 7.2 g/dL      Hematocrit 23.7 %       Pt 33.4 inr 3.0   Sodium 148 mmol/L      Potassium 3.9 mmol/L      Chloride 108 mmol/L      CO2 27 mmol/L      Anion Gap 13 mmol/L      Glucose 126 mg/dL      BUN 78 mg/dL      CREATININE 1.8 mg/dL      GFR Non-African American 28 mL/min/1.73      GFR African American 33     Calcium 8.0 mg/dL      Total Protein 5.0 g/dL      Albumin 3.1 g/dL                   Physical Exam:General appearance: Awake, opens eyes,, intubated, no obvious distress   Head: Normocephalic, atraumatic--ETT in place   Eyes: Sclera clear   Lungs: Breathing over ventilator   Heart: Tachycardic rate and rhythm-- NSR -ST   Extremities: Trace edema at ankles; bilateral soft wrist restraints   Skin: Incision site right foot       Mental Status: Intubated, opens eyes to command, tracks examiner, follows simple commands       Cranial Nerves:   I: smell     II: visual acuity     II: visual fields bilateral threat   II: pupils miotic but ERRLA   III,VII: ptosis none   III,IV,VI: extraocular muscles pupils are midline today   V: mastication     V: facial light touch sensation appears normal   V,VII: corneal reflex     VII: facial muscle function - upper appears normal   VII: facial muscle function - lower appears symmetric   VIII: hearing appears normal   IX: soft palate elevation     IX,X: gag reflex     XI: trapezius strength     XI: sternocleidomastoid strength     XI: neck extension strength turns neck   XII: tongue strength normal       Motor/sensory:   Bilateral wrist restraints are in place   Squeezes my hands bilaterally and wiggles toes   Normal bulk and tone   No abnormal movements; no tremors noted today   Appreciates light touch throughout       DTR:   No reflexes     No Babinski's   No Chelle CONSTANTINO Consults/Assessments & Plans:   Neuro      Seizure-like activity: In the setting of respiratory failure, septic shock, and uremia. . There was no description of the seizure-like event in question, but she displayed resting tremors of both legs earlier in the week which is consistent with her Parkinson's.                MRI brain completed showed no acute intracranial pathology.                 EEG report is pending although no reports of seizure activity               There is less suspicion for epileptic events at this time       Acute encephalopathy               Multifactorial due to shock, digoxin toxicity, uremia, hyperammonemia, recent change in medications other metabolic derangements       A. fib with RVR               Eliquis currently on hold due to recent thoracentesis; beta-blocker and amiodarone per cardiology       Her exam is skewed due to recent sedation due to agitation.  There has been no seizure activity off AEDs.       Plan:       Continue supportive care   Wean sedation and vent as able   Continue to treat other medical conditions   Await EEG results   Continue to monitor off AEDs   May restart Sinemet post extubation pending on mental status and clinical course   Continue aspirin and statin as well as Eliquis once able   Could use Depakote for agitation if needed   Seizure precautions      Im notes   Plan:   · Care per ICU team   · Weaning sedation, monitor mentation   · Follow EEG results   · Depakote for agitation if needed and restart Sinemet post extubation per neurology   · Monitor BP off pressor   · Off hydrocortisone   · Midodrine 2.5mg TID PRN   · Cardio consulted, appreciate recommendations   · Continue amiodarone and lopressor 25 mg BID for A fib RVR   · Eliquis and aspirin resumed   · Follow thoracentesis results   · Wean on vent as tolerated   · Daily SBT   · Continue chest tube   · Continue Zosyn day 5   · Diuresis per nephro   · Continue on lantus 5u and LDSS   · Monitor BG q4h   · Hypoglycemia protocol in place   · Appreciate input from specialties       PT/OT evaluation: not indicated at this time   DVT prophylaxis/ GI prophylaxis: eliquis/famotidine   Disposition: continue current care             Medications:iv albumin 25 g bid- iv pepcid 10 mg qd- iv lasix 40 mg bid   iv mag 1 g x1- iv lopressor 5mg qid- iv zosyn 4.5 g bid   kcl 20 meq iv x1- iv cordarone 450 mg  at 33.3 hr infusion   iv precedex 1000 mcg in nss 250 cc at 2.65 to 39.68 mlhr- iv versed x1         Orders: MICU  Ett/ vent to extubated in pm ot 3 L nc   Oximetry   Vs per icu   Qd labs   Chest tube   Npo   Niv q 4 hrs with resp cks q 4 hrs   Resp therapy   Art line

## 2022-07-12 NOTE — CARE COORDINATION
7/12:  Update CM Note:  Pt presented to the ER for for AMS. Pt had recent changes in psych medications & the right 2nd toe amputation. Pt was intubated & transferred to MICU. Pt was extubated on 7/11. Pt is on Iv Fluids, Iv Maxipime, Iv Lasix  & po Eliquis (this is not new). Cm spoke with Tom/son  at [phone] & daughter Catalina Coyne. Pt doesn't have a PCP & uses the [facility] in [place]. Pt will need PCP on dc. Pt lives alone in a apartment with an elevator to enter. Pt has a walker, glucometer & treats her bs with sq insulin. Pt has stayed at [address] [place] [address] want to return to either. Cm explained that pt may need a SNF for Rehab. CM left SNF/[facility] & [place] list with pt's belongings in her room for the family to look over. ZACHERY explained that family needs to choice 3-4 places for dc planning. Needs are unclear until assessed by PT/OT. [facility] place [facility] can offer private pay options if family is interested. Sw/ZACHERY will continue to follow for dc planning.   Electronically signed by Teresita Pop RN on 7/12/2022 at 3:28 PM

## 2022-07-12 NOTE — FLOWSHEET NOTE
Inpatient Wound Care(initial evaluation)  4422    Admit Date: 7/5/2022 12:25 PM    Reason for consult:  Right toe amp    Significant history:  Per H & P  Chief complaint: had concerns including Altered Mental Status (per ems family reports AMS x 45 minutes, recent psych med changes and right toe amputation). Patient complained of SOB x 1 day  past medical history of  Acute on chronic congestive heart failure, coronary artery disease, chronic kidney disease, T2 DM, HTN, Hyperlipidemia, SERENA, Parkinson, Asthma, Anxiety, Breast Ca ( S/P right lumpectomy). Patient stated SOB since last 1 day which was gradual on onset and progressed.      Findings:    07/12/22 0945   Skin Integumentary    Skin Integrity Ecchymosis   Location BUE, back, right thigh, abdomen   Skin Integrity Site 2   Skin Integrity Location 2 Excoriation; Redness   Location 2 right abdominal fold   Incision Toe (Comment  which one) Right   No Date First Assessed or Time First Assessed found. Present on Hospital Admission: Yes  Location: Toe (Comment  which one)  Incision Location Orientation: Right  Incision Description (Comments): second toe   Wound Image    Dressing/Treatment Open to air   Closure Sutures   Margins Approximated   Drainage Amount None   chest tube mid back in place  Previous amputation right third toe  **Informed Consent**    photos taken of right toe and inserted into their chart as part of their permanent medical record for purposes of documentation, treatment management and/or medical review. All Images taken on 7/12/22 of patient name: Maximiliano Lugo were transmitted and stored on secured [place] located within [facility] by a registered [employer] Mobile Application Device.      Plan:  antifungal powder to folds  Barrier cream to buttocks  Heelmedix boots  Will need continued preventative care  FMS in place      [place]any [place] 7/12/2022 10:21 AM

## 2022-07-13 ENCOUNTER — APPOINTMENT (OUTPATIENT)
Dept: GENERAL RADIOLOGY | Age: 72
DRG: 870 | End: 2022-07-13
Payer: MEDICARE

## 2022-07-13 LAB
ALBUMIN SERPL-MCNC: 3.5 G/DL (ref 3.5–5.2)
ALP BLD-CCNC: 81 U/L (ref 35–104)
ALT SERPL-CCNC: <5 U/L (ref 0–32)
ANION GAP SERPL CALCULATED.3IONS-SCNC: 16 MMOL/L (ref 7–16)
ANISOCYTOSIS: ABNORMAL
APTT: 22.6 SEC (ref 24.5–35.1)
AST SERPL-CCNC: 25 U/L (ref 0–31)
BASOPHILS ABSOLUTE: 0.06 E9/L (ref 0–0.2)
BASOPHILS RELATIVE PERCENT: 0.9 % (ref 0–2)
BILIRUB SERPL-MCNC: 1.1 MG/DL (ref 0–1.2)
BUN BLDV-MCNC: 35 MG/DL (ref 6–23)
CALCIUM SERPL-MCNC: 8.6 MG/DL (ref 8.6–10.2)
CHLORIDE BLD-SCNC: 103 MMOL/L (ref 98–107)
CO2: 24 MMOL/L (ref 22–29)
CREAT SERPL-MCNC: 1.1 MG/DL (ref 0.5–1)
EOSINOPHILS ABSOLUTE: 0.12 E9/L (ref 0.05–0.5)
EOSINOPHILS RELATIVE PERCENT: 1.7 % (ref 0–6)
GFR AFRICAN AMERICAN: 59
GFR NON-AFRICAN AMERICAN: 49 ML/MIN/1.73
GLUCOSE BLD-MCNC: 157 MG/DL (ref 74–99)
GRAM STAIN ORDERABLE: NORMAL
HCT VFR BLD CALC: 29.4 % (ref 34–48)
HCT VFR BLD CALC: 29.8 % (ref 34–48)
HEMOGLOBIN: 8.7 G/DL (ref 11.5–15.5)
HYPOCHROMIA: ABNORMAL
IMMATURE RETIC FRACT: 18.3 % (ref 3–15.9)
INR BLD: 1.4
LYMPHOCYTES ABSOLUTE: 1.38 E9/L (ref 1.5–4)
LYMPHOCYTES RELATIVE PERCENT: 20 % (ref 20–42)
MAGNESIUM: 2 MG/DL (ref 1.6–2.6)
MAGNESIUM: 2.2 MG/DL (ref 1.6–2.6)
MCH RBC QN AUTO: 26.8 PG (ref 26–35)
MCHC RBC AUTO-ENTMCNC: 29.2 % (ref 32–34.5)
MCV RBC AUTO: 91.7 FL (ref 80–99.9)
METAMYELOCYTES RELATIVE PERCENT: 2.6 % (ref 0–1)
METER GLUCOSE: 154 MG/DL (ref 74–99)
METER GLUCOSE: 184 MG/DL (ref 74–99)
METER GLUCOSE: 188 MG/DL (ref 74–99)
METER GLUCOSE: 231 MG/DL (ref 74–99)
MONOCYTES ABSOLUTE: 0.34 E9/L (ref 0.1–0.95)
MONOCYTES RELATIVE PERCENT: 5.2 % (ref 2–12)
NEUTROPHILS ABSOLUTE: 4.97 E9/L (ref 1.8–7.3)
NEUTROPHILS RELATIVE PERCENT: 69.6 % (ref 43–80)
OVALOCYTES: ABNORMAL
PDW BLD-RTO: 15.2 FL (ref 11.5–15)
PHOSPHORUS: 1.8 MG/DL (ref 2.5–4.5)
PHOSPHORUS: 2 MG/DL (ref 2.5–4.5)
PLATELET # BLD: 60 E9/L (ref 130–450)
PLATELET CONFIRMATION: NORMAL
PMV BLD AUTO: 13.1 FL (ref 7–12)
POIKILOCYTES: ABNORMAL
POTASSIUM SERPL-SCNC: 3.5 MMOL/L (ref 3.5–5)
PROCALCITONIN: 0.24 NG/ML (ref 0–0.08)
PROTHROMBIN TIME: 15.4 SEC (ref 9.3–12.4)
RBC # BLD: 3.25 E12/L (ref 3.5–5.5)
REPORT: NORMAL
RETIC HGB EQUIVALENT: 28.1 PG (ref 28.2–36.6)
RETICULOCYTE ABSOLUTE COUNT: 0.01 E12/L
RETICULOCYTE COUNT PCT: 0.2 % (ref 0.4–1.9)
SODIUM BLD-SCNC: 143 MMOL/L (ref 132–146)
TEAR DROP CELLS: ABNORMAL
THIS TEST SENT TO: NORMAL
TOTAL PROTEIN: 5.8 G/DL (ref 6.4–8.3)
WBC # BLD: 6.9 E9/L (ref 4.5–11.5)

## 2022-07-13 PROCEDURE — 97535 SELF CARE MNGMENT TRAINING: CPT

## 2022-07-13 PROCEDURE — 2060000000 HC ICU INTERMEDIATE R&B

## 2022-07-13 PROCEDURE — 6370000000 HC RX 637 (ALT 250 FOR IP)

## 2022-07-13 PROCEDURE — 84100 ASSAY OF PHOSPHORUS: CPT

## 2022-07-13 PROCEDURE — 71045 X-RAY EXAM CHEST 1 VIEW: CPT

## 2022-07-13 PROCEDURE — S5553 INSULIN LONG ACTING 5 U: HCPCS | Performed by: INTERNAL MEDICINE

## 2022-07-13 PROCEDURE — 36415 COLL VENOUS BLD VENIPUNCTURE: CPT

## 2022-07-13 PROCEDURE — 2580000003 HC RX 258

## 2022-07-13 PROCEDURE — 6370000000 HC RX 637 (ALT 250 FOR IP): Performed by: INTERNAL MEDICINE

## 2022-07-13 PROCEDURE — 6360000002 HC RX W HCPCS: Performed by: INTERNAL MEDICINE

## 2022-07-13 PROCEDURE — 99232 SBSQ HOSP IP/OBS MODERATE 35: CPT | Performed by: INTERNAL MEDICINE

## 2022-07-13 PROCEDURE — 85610 PROTHROMBIN TIME: CPT

## 2022-07-13 PROCEDURE — 97530 THERAPEUTIC ACTIVITIES: CPT

## 2022-07-13 PROCEDURE — 85730 THROMBOPLASTIN TIME PARTIAL: CPT

## 2022-07-13 PROCEDURE — 2580000003 HC RX 258: Performed by: INTERNAL MEDICINE

## 2022-07-13 PROCEDURE — 80053 COMPREHEN METABOLIC PANEL: CPT

## 2022-07-13 PROCEDURE — 2500000003 HC RX 250 WO HCPCS

## 2022-07-13 PROCEDURE — 83735 ASSAY OF MAGNESIUM: CPT

## 2022-07-13 PROCEDURE — 94660 CPAP INITIATION&MGMT: CPT

## 2022-07-13 PROCEDURE — 6370000000 HC RX 637 (ALT 250 FOR IP): Performed by: STUDENT IN AN ORGANIZED HEALTH CARE EDUCATION/TRAINING PROGRAM

## 2022-07-13 PROCEDURE — 84145 PROCALCITONIN (PCT): CPT

## 2022-07-13 PROCEDURE — 85045 AUTOMATED RETICULOCYTE COUNT: CPT

## 2022-07-13 PROCEDURE — 97166 OT EVAL MOD COMPLEX 45 MIN: CPT

## 2022-07-13 PROCEDURE — 94640 AIRWAY INHALATION TREATMENT: CPT

## 2022-07-13 PROCEDURE — 2700000000 HC OXYGEN THERAPY PER DAY

## 2022-07-13 PROCEDURE — 97161 PT EVAL LOW COMPLEX 20 MIN: CPT

## 2022-07-13 PROCEDURE — 85025 COMPLETE CBC W/AUTO DIFF WBC: CPT

## 2022-07-13 PROCEDURE — 84238 ASSAY NONENDOCRINE RECEPTOR: CPT

## 2022-07-13 PROCEDURE — 82962 GLUCOSE BLOOD TEST: CPT

## 2022-07-13 RX ADMIN — CEFEPIME 2000 MG: 2 INJECTION, POWDER, FOR SOLUTION INTRAVENOUS at 14:31

## 2022-07-13 RX ADMIN — INSULIN LISPRO 2 UNITS: 100 INJECTION, SOLUTION INTRAVENOUS; SUBCUTANEOUS at 21:21

## 2022-07-13 RX ADMIN — ONDANSETRON 4 MG: 2 INJECTION INTRAMUSCULAR; INTRAVENOUS at 03:49

## 2022-07-13 RX ADMIN — Medication 5 MG: at 21:12

## 2022-07-13 RX ADMIN — METOPROLOL TARTRATE 50 MG: 50 TABLET, FILM COATED ORAL at 21:12

## 2022-07-13 RX ADMIN — POTASSIUM BICARBONATE 20 MEQ: 782 TABLET, EFFERVESCENT ORAL at 06:59

## 2022-07-13 RX ADMIN — DIBASIC SODIUM PHOSPHATE, MONOBASIC POTASSIUM PHOSPHATE AND MONOBASIC SODIUM PHOSPHATE 2 TABLET: 852; 155; 130 TABLET ORAL at 09:27

## 2022-07-13 RX ADMIN — IPRATROPIUM BROMIDE AND ALBUTEROL SULFATE 1 AMPULE: .5; 2.5 SOLUTION RESPIRATORY (INHALATION) at 15:36

## 2022-07-13 RX ADMIN — INSULIN GLARGINE-YFGN 5 UNITS: 100 INJECTION, SOLUTION SUBCUTANEOUS at 21:21

## 2022-07-13 RX ADMIN — CARBIDOPA AND LEVODOPA 2 TABLET: 25; 100 TABLET, EXTENDED RELEASE ORAL at 14:30

## 2022-07-13 RX ADMIN — SODIUM CHLORIDE, PRESERVATIVE FREE 10 ML: 5 INJECTION INTRAVENOUS at 09:21

## 2022-07-13 RX ADMIN — CEFEPIME 2000 MG: 2 INJECTION, POWDER, FOR SOLUTION INTRAVENOUS at 02:29

## 2022-07-13 RX ADMIN — BUDESONIDE 500 MCG: 0.5 SUSPENSION RESPIRATORY (INHALATION) at 08:02

## 2022-07-13 RX ADMIN — ONDANSETRON 4 MG: 2 INJECTION INTRAMUSCULAR; INTRAVENOUS at 10:50

## 2022-07-13 RX ADMIN — ATORVASTATIN CALCIUM 20 MG: 20 TABLET, FILM COATED ORAL at 09:22

## 2022-07-13 RX ADMIN — POTASSIUM PHOSPHATE, MONOBASIC AND POTASSIUM PHOSPHATE, DIBASIC 20 MMOL: 224; 236 INJECTION, SOLUTION, CONCENTRATE INTRAVENOUS at 23:15

## 2022-07-13 RX ADMIN — CARBIDOPA AND LEVODOPA 2 TABLET: 25; 100 TABLET, EXTENDED RELEASE ORAL at 09:22

## 2022-07-13 RX ADMIN — INSULIN LISPRO 1 UNITS: 100 INJECTION, SOLUTION INTRAVENOUS; SUBCUTANEOUS at 09:22

## 2022-07-13 RX ADMIN — AMIODARONE HYDROCHLORIDE 200 MG: 200 TABLET ORAL at 09:22

## 2022-07-13 RX ADMIN — INSULIN LISPRO 1 UNITS: 100 INJECTION, SOLUTION INTRAVENOUS; SUBCUTANEOUS at 12:12

## 2022-07-13 RX ADMIN — QUETIAPINE FUMARATE 25 MG: 25 TABLET ORAL at 09:22

## 2022-07-13 RX ADMIN — ONDANSETRON 4 MG: 2 INJECTION INTRAMUSCULAR; INTRAVENOUS at 14:36

## 2022-07-13 RX ADMIN — SODIUM CHLORIDE, PRESERVATIVE FREE 10 ML: 5 INJECTION INTRAVENOUS at 03:49

## 2022-07-13 RX ADMIN — SODIUM CHLORIDE, PRESERVATIVE FREE 10 ML: 5 INJECTION INTRAVENOUS at 21:12

## 2022-07-13 RX ADMIN — IPRATROPIUM BROMIDE AND ALBUTEROL SULFATE 1 AMPULE: .5; 2.5 SOLUTION RESPIRATORY (INHALATION) at 20:20

## 2022-07-13 RX ADMIN — INSULIN LISPRO 1 UNITS: 100 INJECTION, SOLUTION INTRAVENOUS; SUBCUTANEOUS at 17:13

## 2022-07-13 RX ADMIN — IPRATROPIUM BROMIDE AND ALBUTEROL SULFATE 1 AMPULE: .5; 2.5 SOLUTION RESPIRATORY (INHALATION) at 11:57

## 2022-07-13 RX ADMIN — IPRATROPIUM BROMIDE AND ALBUTEROL SULFATE 1 AMPULE: .5; 2.5 SOLUTION RESPIRATORY (INHALATION) at 08:02

## 2022-07-13 RX ADMIN — BUDESONIDE 500 MCG: 0.5 SUSPENSION RESPIRATORY (INHALATION) at 20:20

## 2022-07-13 RX ADMIN — CARBIDOPA AND LEVODOPA 2 TABLET: 25; 100 TABLET, EXTENDED RELEASE ORAL at 21:12

## 2022-07-13 RX ADMIN — QUETIAPINE FUMARATE 25 MG: 25 TABLET ORAL at 21:12

## 2022-07-13 RX ADMIN — POLYETHYLENE GLYCOL 3350 17 G: 17 POWDER, FOR SOLUTION ORAL at 09:22

## 2022-07-13 RX ADMIN — METOPROLOL TARTRATE 50 MG: 50 TABLET, FILM COATED ORAL at 09:22

## 2022-07-13 RX ADMIN — FAMOTIDINE 10 MG: 20 TABLET ORAL at 09:22

## 2022-07-13 ASSESSMENT — PAIN SCALES - GENERAL: PAINLEVEL_OUTOF10: 0

## 2022-07-13 NOTE — PLAN OF CARE
Problem: Chronic Conditions and Co-morbidities  Goal: Patient's chronic conditions and co-morbidity symptoms are monitored and maintained or improved  7/13/2022 1037 by Capri Mcmanus RN  Outcome: Progressing  7/12/2022 2109 by Alexia Eugene RN  Outcome: Progressing  7/12/2022 2109 by Alexia Eugene RN  Outcome: Progressing     Problem: Discharge Planning  Goal: Discharge to home or other facility with appropriate resources  7/13/2022 1037 by Capri Mcmanus RN  Outcome: Progressing  7/12/2022 2109 by Alexia Eugene RN  Outcome: Progressing  7/12/2022 2109 by Alexia Eugene RN  Outcome: Progressing     Problem: Pain  Goal: Verbalizes/displays adequate comfort level or baseline comfort level  7/13/2022 1037 by Capri Mcmanus RN  Outcome: Progressing  7/13/2022 0650 by Win Arenas RN  Outcome: Progressing     Problem: Skin/Tissue Integrity  Goal: Absence of new skin breakdown  Description: 1. Monitor for areas of redness and/or skin breakdown  2. Assess vascular access sites hourly  3. Every 4-6 hours minimum:  Change oxygen saturation probe site  4. Every 4-6 hours:  If on nasal continuous positive airway pressure, respiratory therapy assess nares and determine need for appliance change or resting period.   7/13/2022 1037 by Capri Mcmanus RN  Outcome: Progressing  7/13/2022 0650 by Win Arenas RN  Outcome: Progressing  7/12/2022 2109 by Alexia Eugene RN  Outcome: Progressing     Problem: ABCDS Injury Assessment  Goal: Absence of physical injury  7/13/2022 1037 by Capri Mcmanus RN  Outcome: Progressing  Flowsheets (Taken 7/13/2022 1035)  Absence of Physical Injury: Implement safety measures based on patient assessment  7/13/2022 0650 by Win Arenas RN  Outcome: Kelsyswati Murray (Taken 7/12/2022 2108 by Alexia Eugene RN)  Absence of Physical Injury: Implement safety measures based on patient assessment     Problem: Safety - Adult  Goal: Free from fall injury  7/13/2022 1037 by Cat Kurtz, RN  Outcome: Progressing  Flowsheets (Taken 7/13/2022 1035)  Free From Fall Injury: Instruct family/caregiver on patient safety  7/13/2022 0650 by Liz Verma, RN  Outcome: Progressing  [address] (Taken 7/12/2022 2108 by Poonam Fairbanks, RN)  Free From Fall Injury: Instruct family/caregiver on patient safety     Problem: Nutrition Deficit:  Goal: Optimize nutritional status  Outcome: Progressing     Problem: Cardiovascular - Adult  Goal: Maintains optimal cardiac output and hemodynamic stability  Outcome: Progressing     Problem: Metabolic/Fluid and Electrolytes - Adult  Goal: Hemodynamic stability and optimal renal function maintained  Outcome: Progressing

## 2022-07-13 NOTE — PROGRESS NOTES
[address]      Date:2022                                                  Patient Name: Jann Morejon  MRN: [id number]  : 1950  Room: [address] [place] [address]    Evaluating OT: EMERY Hahn, OTR/L  # [id number]    Referring Provider:  Yuriy Singleton MD, Eli Santos MD  Specific Provider Orders:  Annumesh Romero and Treat\"  22    Diagnosis: Shock ([address] [R57.9]  Uremia [N19]  SHANTANU (acute kidney injury) ([address] [N17.9]  Poisoning by digoxin, accidental or unintentional, initial encounter [T46.0X1A]  Altered mental status, unspecified altered mental status type [R41.82]  Acute pancreatitis, unspecified complication status, unspecified pancreatitis type [K85.90]    Pt was admitted w/ Altered Mental Status, Bradycardia, Hypotension. Recent Med Changes for Psych issues/R Toe Amputation (22). Pertinent Medical History:  Pt has a past medical history of A-fib ([address] Acute on chronic congestive heart failure ([address] Anxiety, Asthma, CAD (coronary artery disease), Cancer ([address] Chronic kidney disease, Depression, Diabetes mellitus ([address] H/O mammogram, Hx MRSA infection, Hyperlipidemia, Hypertension, Lateral epicondylitis, SERENA on CPAP, Parkinson's disease ([address] and Tubal ligation status. ,  has a past surgical history that includes Gallbladder surgery; Toe amputation; Cholecystectomy; Colonoscopy (7/29/15); Endoscopy, colon, diagnostic (7/19/15); Upper gastrointestinal endoscopy; lumbar laminectomy; Tonsillectomy; Breast lumpectomy; Breast reduction surgery; Cardiac catheterization (2014); Cardiac catheterization (2022); and CT GUIDED CHEST TUBE (2022).     Surgeries this admission: None   Intubated 22, Right Pigtail Chest Tube Placed posteriorly Right Lung 22, Extubated 22    Precautions:  Fall Risk  Fecal Management System  Pure-Wick Catheter  Pigtail Chest Tube - posteriorly R Lung  Minced/Moist Diet/No Straw      Assessment of current deficits   [x] Functional mobility   [x]ADLs  [x] Strength               []Cognition   [x] Functional transfers   [x] IADLs         [x] Safety Awareness   [x]Endurance   [] Fine Coordination              [x] Balance      [] Vision/perception   []Sensation    []Gross Motor Coordination  [] ROM  [] Delirium                   [] Motor Control       OT PLAN OF CARE   OT POC based on physician orders, patient diagnosis and results of clinical assessment    Frequency/Duration 1-3 days/wk for 2 weeks PRN   Specific OT Treatment to include:     * Instruction/training on adapted ADL techniques and AE recommendations to increase functional independence within precautions       * Training on energy conservation strategies, correct breathing pattern and techniques to improve independence/tolerance for self-care routine  * Functional transfer/mobility training/DME recommendations for increased independence, safety, and fall prevention  * Patient/Family education to increase follow through with safety techniques and functional independence  * Recommendation of environmental modifications for increased safety with functional transfers/mobility and ADLs  * Cognitive retraining/development of therapeutic activities to improve problem solving, judgement, memory, and attention for increased safety/participation in ADL/IADL tasks  * Therapeutic exercise to improve motor endurance, ROM, and functional strength for ADLs/functional transfers  * Therapeutic activities to facilitate/challenge dynamic balance, stand tolerance for increased safety and independence with ADLs  * Therapeutic activities to facilitate gross/fine motor skills for increased independence with ADLs  * Neuro-muscular re-education: facilitation of righting/equilibrium reactions  * Positioning to improve skin integrity, interaction with environment and functional independence  * Delirium prevention/treatment  * Manual techniques for edema management  Other:    Recommended Adaptive Equipment: TBD as pt progresses       Home Living:  Pt lives alone in a single-level apartment, Level Entry, No Basement. Bathroom setup:  Walk-in-Shower, Standard-height Commode   Equipment owned:  Rollator, Shower chair    Available Family Assist:  Family members live locally - provide assist PRN    Prior Level of Function:  Pt reported being IND with all ADLs, Light IADLs, Transfers and Mobility using Rollator for ambulation. Driving:  No  Occupation:  None reported    Pain Level:  Denied pain    Additional Complaints:  Discomfort w/ Multiple Tubes    Cognition: A & O x 4 - able to ID current Day/date INDly   Able to Follow Multi-Step Commands w/ occasional Min VCs for safety   Memory:  good    Sequencing:  good (-)   Problem solving:  good (-)   Judgement/safety:  good (-)  Additional Comments:  Pt was pleasant and cooperative. Vitals/Lab Values:  O2 sats remained WFL w/ 2L O2       Functional Assessment:  AM-PAC Daily Activity Raw Score: 11/24     Initial Eval Status  Date: 7/13/22   Treatment Status  Date: STGs = LTGs  Time frame: 10-14 days   Feeding SUP/Set up      NA   Grooming Min A/Set up    Min A for hair care  Initially required Min A for safety w/ dynamic sitting balance - improved to Close SUP EOB   Unable to tolerate ambulating to or standing at sink    SUP/Set up  Seated/Standing at the Sink   UB Dressing Mod A/Set up    Simulated - seated EOB  Unable to tolerate item retrieval for activities    Set up     LB Dressing Dep    Max A to don socks  Mod A of 2 for safety in standing - pants simulated  Pt ed for safe/adaptive techs, use of adaptive equip    Min A     Bathing NT      Min A      Toileting Dep    Pure-Wick  Fecal Mgmt System    Min A     Bed Mobility  Supine to sit:  Max A of 2   Sit to supine:  Max A of 2     VCs for safety    Supine to sit: SUP  Sit to supine: SUP     Functional Transfers Mod A of 2 for safety    Standing from EOB, mgmt of multiple lines  Pt ed for safety/hand placement    SUP     Functional Mobility Mod A of 2 for safety w/ [employer]    Side-stepping very short distance along EOB  Pt ed for safety/improved safety awareness, walker safety    SUP     Balance Sitting:     Static:  Min A -> SUP    Dynamic:  Min A -> Close SUP w/ functional ax EOB     Standing:     Static:  Mod A of 2 w/ [employer]    Dynamic:  Mod A of 2 w/ functional ax/mobility w/ [employer]    Sitting:     Static:  IND    Dynamic:  IND w/ functional ax    Standing:     Static:  SUP w/ AD PRN    Dynamic:  SUP w/ functional ax/mobility w/ AD PRN   Activity Tolerance Fair    Able to tolerate sitting EOB > 20 mins  Able to tolerate standing ~ 3-4 mins    Good(-)   Visual/  Perceptual    Hearing: WNL   Glasses: No    WFL   Hearing Aids:  No               Hand Dominance: Right   AROM Strength Additional Info:    RUE  WFL Grossly 4/5 Good ;   Good(-) FMC/dexterity noted during ADL tasks     [facility] Good ;    Good(-) FMC/dexterity noted during ADL tasks       Sensation:  Denies numbness or tingling Niraj UEs   Tone: WFL Niraj UEs   Edema: None Noted Niraj UEs     Comments: Upon arrival, patient was found in semi-supine. She was agreeable to participate in therapeutic ax. No Family present during session. Received permission from RN prior to engaging pt in OT services. Educated pt on role of OT services. At the end of the session, patient was properly positioned in Semi-Supine - Set up to eat meal.  Call light and phone within reach, all lines and tubes intact. Oriented pt to call bell. Made all appropriate Environmental Modifications to facilitate pt's level of IND and safety. All needs met. Bed Alarm activated. Overall patient demonstrated decreased independence and safety during completion of ADL/functional transfer/mobility tasks.   Pt would benefit from continued skilled OT to Ex [id number]       Therapeutic Activities [id number]       ADL/Self Care [id number]  23  2   Orthotic Management [id number]       Manual [id number]     Neuro Re-Ed [id number]       Non-Billable Time              Evaluation Time additionally includes thorough review of current medical information, gathering information on past medical history/social history and prior level of function, completion of standardized testing/informal observation of tasks, assessment of data and education on plan of care and goals.             Jadyn Moyer, MOT, OTR/L  # [id number]

## 2022-07-13 NOTE — PROGRESS NOTES
[facility] MD Eleanor Badillo MD   84 Ricardo Patel MD .  Progress note  Patient's Name: Joshua Poster  4:39 PM  7/13/2022 7/7 : seen in her room intubated mechanically ventilated fio2 40 . LE edema 1-2+ . On levophed . UO ok over last 24 hours     7/8 pt not in her room when coming to see her . She is in IR lab. Case d/w RN     7/9 seen in her room d/w ICU resident   Still on some pressors actually BP low when seeing her   Has CT in place with good drainage . Good UO in response to lasix via genao cath . Fio2 40 PEEP 8       7/10 seen in ICU intubated mechanically ventilated fio2 40 PEEP 8   1-2+ edema in UE and LEs   No pressors  On precedex drip     7/11 : seen in ICU extubated earlier today . BP stable on HFNC    7/12 seen in her room ,extubated , on o2/nc . Chest tube in place . Fevers today and precedex drip put on hold .good UO over last 24 hours     7/13 : seen in her room on o2/nc BP stable clinically doing better still with CT along with genao and fecal system    History of Present Ilness:      69-year old female with a past medical history of hypertension, A. fib, CAD, asthma, HFpEF, CKD, SERENA, hyperlipidemia, type II DM, anxiety/depression, Parkinson's disease, restless leg syndrome who presented in the ED for altered mental status.     She recently had a right second toe amputation back last month at [facility].  She was discharged after 2 weeks. According to the patient's family, she was doing okay until few hours prior to admission, patient was noted to be acting different yesterday morning. She was noted to be drowsy    Nephrology asked to see for SHANTANU   I did see pt in ICU she is intubated mechanically ventilated . She is on levophed . She has some LE edema. Vent setting stable .    UO marginal .       Past Medical History:   Diagnosis Date    [facility]     Acute on chronic congestive heart failure (HCC)     Anxiety     Asthma     CAD (coronary artery disease) 01/21/2016    Cancer ([facility]  breast ca 2006    right lumpectomy    Chronic kidney disease     nephrolithiasis    Depression     Diabetes mellitus ([facility]     H/O mammogram     Hx MRSA infection     toe infection january 2012    Hyperlipidemia     Hypertension     Lateral epicondylitis     SERENA on CPAP     Parkinson's disease ([facility]     Tubal ligation status        Past Surgical History:   Procedure Laterality Date    BREAST LUMPECTOMY      BREAST REDUCTION SURGERY      CARDIAC CATHETERIZATION  4/28/2014    Dr. Nicho Butler  01/11/2022    Dr. France Roads  7/29/15    CT GUIDED CHEST TUBE  7/8/2022    CT GUIDED CHEST TUBE 7/8/2022 Vitaliy Kim MD [facility] CT    ENDOSCOPY, COLON, DIAGNOSTIC  7/19/15    GALLBLADDER SURGERY      LUMBAR LAMINECTOMY      TOE AMPUTATION      TONSILLECTOMY      UPPER GASTROINTESTINAL ENDOSCOPY         Family History   Problem Relation Age of Onset    Cancer Mother         Lung Ca    Cancer Father         lung Ca    Diabetes Sister     Heart Disease Sister     Seizures Son     Other Brother         sepsis        reports that she has never smoked. She has never used smokeless tobacco. She reports that she does not drink alcohol and does not use drugs.     Allergies:  Ciprofloxacin and Codeine    Current Medications:    QUEtiapine (SEROQUEL) tablet 25 mg, BID  metoprolol tartrate (LOPRESSOR) tablet 50 mg, BID  melatonin disintegrating tablet 5 mg, Nightly  labetalol (NORMODYNE;TRANDATE) injection 10 mg, Q6H PRN  hydrALAZINE (APRESOLINE) injection 10 mg, Q6H PRN  famotidine (PEPCID) tablet 10 mg, Daily  amiodarone (CORDARONE) tablet 200 mg, Daily  insulin lispro (HUMALOG) injection vial 0-6 Units, 4x Daily AC & HS  carbidopa-levodopa (SINEMET CR)  MG per extended release tablet 2 tablet, TID  cefepime (MAXIPIME) 2000 mg IVPB minibag, Q12H  ondansetron (ZOFRAN) injection 4 mg, Q6H PRN  [Held LYMPHOPCT 20.0 07/13/2022 05:02 AM    MONOPCT 5.2 07/13/2022 05:02 AM    MYELOPCT 0.9 07/09/2022 12:30 AM    EOSPCT 1 06/16/2017 12:00 AM    BASOPCT 0.9 07/13/2022 05:02 AM    MONOSABS 0.34 07/13/2022 05:02 AM    LYMPHSABS 1.38 07/13/2022 05:02 AM    EOSABS 0.12 07/13/2022 05:02 AM    BASOSABS 0.06 07/13/2022 05:02 AM     CMP:    Lab Results   Component Value Date/Time     07/13/2022 05:02 AM    K 3.5 07/13/2022 05:02 AM    K 5.0 07/06/2022 02:41 AM     07/13/2022 05:02 AM    CO2 24 07/13/2022 05:02 AM    BUN 35 07/13/2022 05:02 AM    CREATININE 1.1 07/13/2022 05:02 AM    GFRAA 59 07/13/2022 05:02 AM    LABGLOM 49 07/13/2022 05:02 AM    GLUCOSE 157 07/13/2022 05:02 AM    PROT 5.8 07/13/2022 05:02 AM    LABALBU 3.5 07/13/2022 05:02 AM    CALCIUM 8.6 07/13/2022 05:02 AM    BILITOT 1.1 07/13/2022 05:02 AM    ALKPHOS 81 07/13/2022 05:02 AM    AST 25 07/13/2022 05:02 AM    ALT <5 07/13/2022 05:02 AM     Ionized Calcium:  No results found for: IONCA  Magnesium:    Lab Results   Component Value Date/Time    MG 2.2 07/13/2022 05:02 AM     Phosphorus:    Lab Results   Component Value Date/Time    PHOS 2.0 07/13/2022 05:02 AM     U/A:    Lab Results   Component Value Date/Time    COLORU Yellow 07/05/2022 05:43 PM    PHUR 5.5 07/05/2022 05:43 PM    WBCUA 1-3 07/05/2022 05:43 PM    RBCUA NONE 07/05/2022 05:43 PM    RBCUA NONE 03/25/2013 09:00 PM    YEAST RARE 10/27/2015 07:18 PM    BACTERIA FEW 07/05/2022 05:43 PM    CLARITYU Clear 07/05/2022 05:43 PM    SPECGRAV 1.025 07/05/2022 05:43 PM    LEUKOCYTESUR Negative 07/05/2022 05:43 PM    UROBILINOGEN 0.2 07/05/2022 05:43 PM    BILIRUBINUR Negative 07/05/2022 05:43 PM    BLOODU Negative 07/05/2022 05:43 PM    GLUCOSEU Negative 07/05/2022 05:43 PM     Microalbumen/Creatinine ratio:  No components found for: RUCREAT  Iron Saturation:  No components found for: PERCENTFE  TIBC:    Lab Results   Component Value Date/Time    TIBC 152 07/09/2022 08:01 AM     FERRITIN:    Lab Results   Component Value Date/Time    FERRITIN 243 07/09/2022 08:01 AM        Imaging:  XR CHEST PORTABLE   Final Result   Grossly stable pulmonary opacities in the lower left lung, which may   represent atelectasis or pneumonia. XR CHEST PORTABLE   Final Result   1. Patchy right perihilar and left lower lobe airspace disease   2. Status post removal of the endotracheal tube and NG tube      3. There is no pneumothorax. XR CHEST PORTABLE   Final Result   Lines and tubes are stable with no pneumothorax on the right. Some   improvement seen in the region of left lung base in the interval.         XR CHEST PORTABLE   Final Result   The evaluation is limited due to low lung volumes. No interval change compared to prior exam.      Lines and tubes are in unchanged position. XR ABDOMEN FOR NG/OG/NE TUBE PLACEMENT   Final Result   Catheter is in the stomach. XR CHEST PORTABLE   Final Result   Catheter placed with significant right lung aeration improvement and no   pneumothorax         MRI BRAIN WO CONTRAST   Final Result   1. No acute intracranial abnormality. 2. No gross epileptogenic lesion is identified. 3. Bilateral mastoid effusions, which may be due to eustachian tube   dysfunction or otomastoiditis. RECOMMENDATIONS:   Unavailable         CT GUIDED CHEST TUBE   Final Result   Successful uncomplicated  right chest tube placement      Recommendations:   1. Follow-up tube check in 2 weeks   2. Flush tube daily with 5 to 10 mL of sterile saline            XR CHEST PORTABLE   Final Result   1. There is no interval change in the bilateral perihilar and lower lobe   airspace disease more prominent within the right lung. 2. Moderate size right pleural effusion. 3. There is no pneumothorax. US RETROPERITONEAL COMPLETE   Final Result   1. Marked bilateral renal cortical thinning. Echogenic renal parenchyma. No hydronephrosis.       2.  A Diaz catheter appears to be decompressing the bladder. XR CHEST PORTABLE   Final Result   Increasing infiltrate throughout the right lung persistent left basilar   alveolar infiltrate is well. XR CHEST PORTABLE   Final Result   Adequately positioned right internal jugular central venous line. Otherwise,   no significant change compared to prior exam glued in lines and tubes. US DUP LOWER EXTREMITIES BILATERAL VENOUS   Final Result   Within the visualized vessels there is no evidence for deep venous   thrombosis               XR CHEST PORTABLE   Final Result   1. No significant change. Cardiomegaly with right pleural effusion. 2.  Support tubes remain in appropriate position. CT HEAD WO CONTRAST   Final Result   No acute intracranial abnormality or significant change in the overall   appearance of the brain. MRI would be useful if symptoms persist.      RECOMMENDATIONS:   Unavailable         XR ABDOMEN FOR NG/OG/NE TUBE PLACEMENT   Final Result   Catheter is in the proximal stomach. XR CHEST PORTABLE   Final Result   Adequately positioned endotracheal tube. Nasogastric tube coursing below   diaphragm. Otherwise, stable exam.         CT ABDOMEN PELVIS WO CONTRAST Additional Contrast? None   Final Result   Increasing fluid within the abdomen when compared to the prior study. The   anasarca is also increased in appearance of the prior examination. Mild stranding seen around the mesentery which correlation to clinical lab   values is recommended to exclude possible pancreatitis or volume overload. Overall the appearance of the pancreas itself is grossly unremarkable. There   is only mild stranding seen throughout the mesenteric root. CT CHEST WO CONTRAST   Final Result   Bilateral lung infiltrates with greatest consolidation right lower lobe   probably secondary to atelectasis and or pneumonia. Moderate right pleural effusion.       Cardiomegaly and small pericardial effusion. The pericardial effusion is new. Small volume right upper abdominal ascites along with anasarca. This is new. XR CHEST PORTABLE   Final Result   New opacity on the right which may relate to pleural effusion with adjacent   atelectasis and or infiltrate. Cardiomegaly and pulmonary vascular   congestion implying elevated left heart filling pressures. Assessment    Acute kidney injury non oligouric: improved   Baseline cr 0.9  Etiology of SHANTANU due to decrease effective in setting of intravascular volume depletion as evident by her need for levophed and her urine lytes with high avidity for cl and Na .   Basically bland urine sediment which make GN/vasculitis unlikely   High BUN in part due to steroids   LE edema noted though she is still on levophed        Continue hemodynamic support  Lasix on hold for now , consider reintroducing next 12-24 hours   With her having ok po intake would stop d5w   Guide Abx per pharmacy dosing           -Encephalopathy metabolic multifactorial : better     -Digoxin toxicity s/p digbind    -Shock R/O septic shock               Electronically signed by Raelene Heimlich, MD on 7/13/2022 at 4:39 PM

## 2022-07-13 NOTE — PLAN OF CARE
Patient's chart updated to reflect:      . - HF care plan, HF education points and HF discharge instructions.  -Orders: 2 gram sodium diet, daily weights, I/O.  -PCP and/or Cardiologist appointment to be scheduled within 7 days of hospital discharge.  -History of HF, not primary admission Dx. Patient admitted for treatment of Diagnosis:  1. SHANTANU (acute kidney injury) ([facility]    2. Shock ([facility]    3. Uremia    4. Poisoning by digoxin, accidental or unintentional, initial encounter    5. Altered mental status, unspecified altered mental status type    6.  Acute pancreatitis, unspecified complication status, unspecified pancreatitis        Michael Campos RN RN, BSN  Heart Failure Navigator

## 2022-07-13 NOTE — PLAN OF CARE
Problem: Pain  Goal: Verbalizes/displays adequate comfort level or baseline comfort level  Outcome: Progressing     Problem: Skin/Tissue Integrity  Goal: Absence of new skin breakdown  Description: 1. Monitor for areas of redness and/or skin breakdown  2. Assess vascular access sites hourly  3. Every 4-6 hours minimum:  Change oxygen saturation probe site  4. Every 4-6 hours:  If on nasal continuous positive airway pressure, respiratory therapy assess nares and determine need for appliance change or resting period.   7/13/2022 0650 by Kaylen Patel RN  Outcome: Progressing  7/12/2022 2109 by Lucia Toribio RN  Outcome: Progressing     Problem: ABCDS Injury Assessment  Goal: Absence of physical injury  Outcome: Progressing  Flowsheets (Taken 7/12/2022 2108 by Lcuia Toribio RN)  Absence of Physical Injury: Implement safety measures based on patient assessment     Problem: Safety - Adult  Goal: Free from fall injury  Outcome: Progressing  Flowsheets (Taken 7/12/2022 2108 by Lucia Toribio RN)  Free From Fall Injury: Instruct family/caregiver on patient safety

## 2022-07-13 NOTE — CARE COORDINATION
Patient on PICU with chest tube to water seal. PT/OT to see today. Left message on son, Tom's voice mail regarding choices for SHERITA. Also spoke with daughter Lilibeth Khan and she stated her brother is out of town and he will give me a choice later today or tomorrow. CM/SW will continue to follow.

## 2022-07-13 NOTE — PROGRESS NOTES
Subjective:  Chart reviewed  Patient seen at bedside  Patient is awake and alert  Denies SOB, N/V, pain  Denies other complaints    Objective:    /69   Pulse 98   Temp 97.5 °F (36.4 °C) (Oral)   Resp 18   Ht 5' (1.524 m)   Wt 238 lb 3.2 oz (108 kg)   SpO2 96%   BMI 46.52 kg/m²     General: NAD, alert   HEENT: normocephalic/atraumatic, mucosa dry without ulcerations,thrush or mucositis, sclera anicteric, conjuntiva pink  NECK: supple, trachea midline  Heart:  IRRR, no murmurs, gallops, or rubs.   Lungs: O2 via nasal cannula, respirations nonlabored and poor respiratory effort difficult to assess with body habitus anteriorly, chest tube right  Abd: Fecal management system, BS present, nontender, nondistended, no masses  Extrem: pitting edema of LE bilaterally   Diaz  Skin: Scattered ecchymoses on arms bilaterally, Intact, no petechia or purpura    CBC with Differential:    Lab Results   Component Value Date/Time    WBC 6.9 07/13/2022 05:02 AM    RBC 3.25 07/13/2022 05:02 AM    HGB 8.7 07/13/2022 05:02 AM    HCT 29.8 07/13/2022 05:02 AM    PLT 60 07/13/2022 05:02 AM    MCV 91.7 07/13/2022 05:02 AM    MCH 26.8 07/13/2022 05:02 AM    MCHC 29.2 07/13/2022 05:02 AM    RDW 15.2 07/13/2022 05:02 AM    NRBC 0.0 03/15/2019 09:10 AM    SEGSPCT 74 08/20/2013 10:45 AM    METASPCT 2.6 07/13/2022 05:02 AM    LYMPHOPCT 20.0 07/13/2022 05:02 AM    MONOPCT 5.2 07/13/2022 05:02 AM    MYELOPCT 0.9 07/09/2022 12:30 AM    EOSPCT 1 06/16/2017 12:00 AM    BASOPCT 0.9 07/13/2022 05:02 AM    MONOSABS 0.34 07/13/2022 05:02 AM    LYMPHSABS 1.38 07/13/2022 05:02 AM    EOSABS 0.12 07/13/2022 05:02 AM    BASOSABS 0.06 07/13/2022 05:02 AM     CMP:    Lab Results   Component Value Date/Time     07/13/2022 05:02 AM    K 3.5 07/13/2022 05:02 AM    K 5.0 07/06/2022 02:41 AM     07/13/2022 05:02 AM    CO2 24 07/13/2022 05:02 AM    BUN 35 07/13/2022 05:02 AM    CREATININE 1.1 07/13/2022 05:02 AM    GFRAA 59 07/13/2022 05:02 AM    LABGLOM 49 07/13/2022 05:02 AM    GLUCOSE 157 07/13/2022 05:02 AM    PROT 5.8 07/13/2022 05:02 AM    LABALBU 3.5 07/13/2022 05:02 AM    CALCIUM 8.6 07/13/2022 05:02 AM    BILITOT 1.1 07/13/2022 05:02 AM    ALKPHOS 81 07/13/2022 05:02 AM    AST 25 07/13/2022 05:02 AM    ALT <5 07/13/2022 05:02 AM          Current Facility-Administered Medications:     QUEtiapine (SEROQUEL) tablet 25 mg, 25 mg, Oral, BID, Dulce Melendrez MD, 25 mg at 07/13/22 3284    metoprolol tartrate (LOPRESSOR) tablet 50 mg, 50 mg, Oral, BID, Dulce Melendrez MD, 50 mg at 07/13/22 4296    melatonin disintegrating tablet 5 mg, 5 mg, Oral, Nightly, Lesly Moody MD, 5 mg at 07/12/22 2020    labetalol (NORMODYNE;TRANDATE) injection 10 mg, 10 mg, IntraVENous, Q6H PRN, Dulce Melendrez MD, 10 mg at 07/11/22 0044    hydrALAZINE (APRESOLINE) injection 10 mg, 10 mg, IntraVENous, Q6H PRN, Dulce Melendrez MD, 10 mg at 07/11/22 0442    famotidine (PEPCID) tablet 10 mg, 10 mg, Oral, Daily, Dulce Melendrez MD, 10 mg at 07/13/22 1448    amiodarone (CORDARONE) tablet 200 mg, 200 mg, Oral, Daily, Dulce Melendrez MD, 200 mg at 07/13/22 0438    insulin lispro (HUMALOG) injection vial 0-6 Units, 0-6 Units, SubCUTAneous, 4x Daily AC & HS, Dulec Melendrez MD, 1 Units at 07/13/22 1212    carbidopa-levodopa (SINEMET CR)  MG per extended release tablet 2 tablet, 2 tablet, Oral, TID, Dulce Melendrez MD, 2 tablet at 07/13/22 6338    cefepime (MAXIPIME) 2000 mg IVPB minibag, 2,000 mg, IntraVENous, Q12H, Dulce Melendrez MD, Stopped at 07/13/22 0630    ondansetron (ZOFRAN) injection 4 mg, 4 mg, IntraVENous, Q6H PRN, Dulce Melendrez MD, 4 mg at 07/13/22 1050    [Held by provider] furosemide (LASIX) injection 40 mg, 40 mg, IntraVENous, BID, Dulce Melendrez MD, 40 mg at 07/11/22 1743    budesonide (PULMICORT) nebulizer suspension 500 mcg, 0.5 mg, Nebulization, BID, Dulce Melendrez MD, 500 mcg at 07/13/22 0802   levalbuterol (XOPENEX) nebulization 0.63 mg, 0.63 mg, Nebulization, Q4H PRN, Mimi Mccain MD    insulin glargine-yfgn [facility]) injection vial 5 Units, 5 Units, SubCUTAneous, Nightly, Mimi Mccain MD, 5 Units at 07/12/22 2022    ipratropium-albuterol (DUONEB) nebulizer solution 1 ampule, 1 ampule, Inhalation, Q4H WA, Mimi Mccain MD, 1 ampule at 07/13/22 1157    [Held by provider] aspirin chewable tablet 81 mg, 81 mg, Oral, Daily, Mimi Mccain MD, 81 mg at 07/07/22 8610    polyethylene glycol (GLYCOLAX) packet 17 g, 17 g, Oral, BID, Mimi Mccain MD, 17 g at 07/13/22 1316    [Held by provider] apixaban (ELIQUIS) tablet 5 mg, 5 mg, Oral, BID, Mimi Mccain MD, 5 mg at 07/09/22 2021    atorvastatin (LIPITOR) tablet 20 mg, 20 mg, Oral, Daily, Mimi Mccain MD, 20 mg at 07/13/22 6036    sodium chloride flush 0.9 % injection 5-40 mL, 5-40 mL, IntraVENous, 2 times per day, Mimi Mccain MD, 10 mL at 07/13/22 0921    0.9 % sodium chloride infusion, , IntraVENous, PRN, Mimi Mccain MD, Last Rate: 10 mL/hr at 07/12/22 0229, New Bag at 07/12/22 0229    acetaminophen (TYLENOL) tablet 650 mg, 650 mg, Oral, Q6H PRN, 650 mg at 07/12/22 0835 **OR** acetaminophen (TYLENOL) suppository 650 mg, 650 mg, Rectal, Q6H PRN, Mimi Mccain MD    glucose chewable tablet 16 g, 4 tablet, Oral, PRN, Mimi Mccain MD    dextrose bolus 10% 125 mL, 125 mL, IntraVENous, PRN **OR** dextrose bolus 10% 250 mL, 250 mL, IntraVENous, PRN, Mimi Mccain MD    glucagon (rDNA) injection 1 mg, 1 mg, IntraMUSCular, PRN, Mimi Mccain MD    dextrose 5 % solution, 100 mL/hr, IntraVENous, PRN, Mimi Mccain MD    XR CHEST PORTABLE   Final Result   Grossly stable pulmonary opacities in the lower left lung, which may   represent atelectasis or pneumonia. XR CHEST PORTABLE   Final Result   1. Patchy right perihilar and left lower lobe airspace disease   2.   Status post removal of the endotracheal tube and NG tube      3. There is no pneumothorax. XR CHEST PORTABLE   Final Result   Lines and tubes are stable with no pneumothorax on the right. Some   improvement seen in the region of left lung base in the interval.         XR CHEST PORTABLE   Final Result   The evaluation is limited due to low lung volumes. No interval change compared to prior exam.      Lines and tubes are in unchanged position. XR ABDOMEN FOR NG/OG/NE TUBE PLACEMENT   Final Result   Catheter is in the stomach. XR CHEST PORTABLE   Final Result   Catheter placed with significant right lung aeration improvement and no   pneumothorax         MRI BRAIN WO CONTRAST   Final Result   1. No acute intracranial abnormality. 2. No gross epileptogenic lesion is identified. 3. Bilateral mastoid effusions, which may be due to eustachian tube   dysfunction or otomastoiditis. RECOMMENDATIONS:   Unavailable         CT GUIDED CHEST TUBE   Final Result   Successful uncomplicated  right chest tube placement      Recommendations:   1. Follow-up tube check in 2 weeks   2. Flush tube daily with 5 to 10 mL of sterile saline            XR CHEST PORTABLE   Final Result   1. There is no interval change in the bilateral perihilar and lower lobe   airspace disease more prominent within the right lung. 2. Moderate size right pleural effusion. 3. There is no pneumothorax. US RETROPERITONEAL COMPLETE   Final Result   1. Marked bilateral renal cortical thinning. Echogenic renal parenchyma. No hydronephrosis. 2.  A Diaz catheter appears to be decompressing the bladder. XR CHEST PORTABLE   Final Result   Increasing infiltrate throughout the right lung persistent left basilar   alveolar infiltrate is well. XR CHEST PORTABLE   Final Result   Adequately positioned right internal jugular central venous line.   Otherwise,   no significant change compared to prior exam glued in lines and tubes. US DUP LOWER EXTREMITIES BILATERAL VENOUS   Final Result   Within the visualized vessels there is no evidence for deep venous   thrombosis               XR CHEST PORTABLE   Final Result   1. No significant change. Cardiomegaly with right pleural effusion. 2.  Support tubes remain in appropriate position. CT HEAD WO CONTRAST   Final Result   No acute intracranial abnormality or significant change in the overall   appearance of the brain. MRI would be useful if symptoms persist.      RECOMMENDATIONS:   Unavailable         XR ABDOMEN FOR NG/OG/NE TUBE PLACEMENT   Final Result   Catheter is in the proximal stomach. XR CHEST PORTABLE   Final Result   Adequately positioned endotracheal tube. Nasogastric tube coursing below   diaphragm. Otherwise, stable exam.         CT ABDOMEN PELVIS WO CONTRAST Additional Contrast? None   Final Result   Increasing fluid within the abdomen when compared to the prior study. The   anasarca is also increased in appearance of the prior examination. Mild stranding seen around the mesentery which correlation to clinical lab   values is recommended to exclude possible pancreatitis or volume overload. Overall the appearance of the pancreas itself is grossly unremarkable. There   is only mild stranding seen throughout the mesenteric root. CT CHEST WO CONTRAST   Final Result   Bilateral lung infiltrates with greatest consolidation right lower lobe   probably secondary to atelectasis and or pneumonia. Moderate right pleural effusion. Cardiomegaly and small pericardial effusion. The pericardial effusion is new. Small volume right upper abdominal ascites along with anasarca. This is new. XR CHEST PORTABLE   Final Result   New opacity on the right which may relate to pleural effusion with adjacent   atelectasis and or infiltrate.   Cardiomegaly and pulmonary vascular   congestion implying elevated left heart filling pressures. 67 yo female with a histroy of stage I, T1 N0, ER positive, HER-2/samira negative ductal cancer of the right breast, status post resection April 2005 followed by radiation therapy, completed October 2005. She then completed 5 years on Arimidex in August 2010. Iron deficiency anemia requiring parental iron in the past.  Last seen in the office 5/7/2018. Admitted to the ICU with septic shock, acute respiratory failure requiring intubation and chest tube placement. Progressive worsening in CBC during admission now with pancytopenia. Leukopenia - resolved    A/P:  -anemia, chronic issue, history of iron deficiency, CKD  -CBC diff plt daily  -maintain Hgb > 7.5 and plt > 10k unless active bleeding identified, >30K  -thrombocytopenia, significant improvement likely secondary to acute infection, follow  -FOBT  -EPO level pending  -recheck retic count  -reticulocyte index very low, WBC improving, history of iron deficiency and CKD, could consider bone marrow in the future if patient doesn't return to baseline       Thank you for allowing us to participate in the care of Maximiliano Leonm. Josue Arciniega PA-C  [phone]    Electronically signed by ERIC Diana on 7/13/2022 at 1:50 PM    Note: This report was completed using [employer] voiced recognition software. Every effort has been made to ensure accuracy; however, inadvertent computerized transcription errors may be present.

## 2022-07-13 NOTE — PROGRESS NOTES
[facility]  Internal Medicine Residency Program  Progress Note - House Team     Patient:  Francis Dubon 66 y.o. female MRN: [id number]     Date of Service: 7/13/2022     CC: Altered mental status with acute hypoxic hypercapnic respiratory failure 2/2 R pleural effusion, possible HAP vs acute decompensated. Days since admission: 8    Subjective     Overnight events:   -She was anxious, melatonin given. - Received Seroquel 25 mg  -Glucose was 198  -K+ replaced. I have seen and examine the patient today at bed side. The Patient is responding very well , no agitation, not anxious. She is aware about person, place, and time. She expresses her desire to talk with her daughter and son. Her appetite is well, and taking minced and moist diet as per instruction. She is having cough spells, but denies any chest pain, SOB. She doesn't have fever. She doesn't have any breathing difficulty, refuses Bi-PAP at night. Objective     Physical Exam:  Vitals: /69   Pulse 98   Temp 97.5 °F (36.4 °C) (Oral)   Resp 18   Ht 5' (1.524 m)   Wt 238 lb 3.2 oz (108 kg)   SpO2 96%   BMI 46.52 kg/m²     I & O - 24hr:   Intake/Output Summary (Last 24 hours) at 7/13/2022 1207  Last data filed at 7/13/2022 0932  Gross per 24 hour   Intake 600 ml   Output 990 ml   Net -390 ml      General Appearance: alert, appears stated age and cooperative  HEENT:  Eye: Normal external eye, sub-conjunctival hemorrhage on R eye. Neck: no adenopathy, no carotid bruit, no JVD, supple, symmetrical, trachea midline and thyroid not enlarged, symmetric, no tenderness/mass/nodules  Lung: Fine crackles; chest tube in place on water seal  Heart: regular rate and rhythm, S1, S2 normal, no murmur, click, rub or gallop  Abdomen: soft, non-tender; bowel sounds normal; no masses,  no organomegaly  Extremities:  extremities normal, atraumatic, no cyanosis or edema  Musculokeletal: No joint swelling, no muscle tenderness.  ROM normal in all joints of extremities. Neurologic: Mental status: Alert, oriented, thought content appropriate  Subject  Pertinent Information & Imaging Studies, Consults   genesis  CBC:   Lab Results   Component Value Date/Time    WBC 6.9 07/13/2022 05:02 AM    RBC 3.25 07/13/2022 05:02 AM    HGB 8.7 07/13/2022 05:02 AM    HCT 29.8 07/13/2022 05:02 AM    MCV 91.7 07/13/2022 05:02 AM    MCH 26.8 07/13/2022 05:02 AM    MCHC 29.2 07/13/2022 05:02 AM    RDW 15.2 07/13/2022 05:02 AM    PLT 60 07/13/2022 05:02 AM    MPV 13.1 07/13/2022 05:02 AM     BMP:    Lab Results   Component Value Date/Time     07/13/2022 05:02 AM    K 3.5 07/13/2022 05:02 AM    K 5.0 07/06/2022 02:41 AM     07/13/2022 05:02 AM    CO2 24 07/13/2022 05:02 AM    BUN 35 07/13/2022 05:02 AM    LABALBU 3.5 07/13/2022 05:02 AM    CREATININE 1.1 07/13/2022 05:02 AM    CALCIUM 8.6 07/13/2022 05:02 AM    GFRAA 59 07/13/2022 05:02 AM    LABGLOM 49 07/13/2022 05:02 AM    GLUCOSE 157 07/13/2022 05:02 AM       IMAGING:   Imaging Studies:    XR CHEST PORTABLE    Result Date: 7/12/2022  Grossly stable pulmonary opacities in the lower left lung, which may represent atelectasis or pneumonia. XR CHEST PORTABLE    Result Date: 7/11/2022  1. Patchy right perihilar and left lower lobe airspace disease 2. Status post removal of the endotracheal tube and NG tube 3. There is no pneumothorax. XR CHEST PORTABLE    Result Date: 7/10/2022  Lines and tubes are stable with no pneumothorax on the right. Some improvement seen in the region of left lung base in the interval.     XR CHEST PORTABLE    Result Date: 7/9/2022  The evaluation is limited due to low lung volumes. No interval change compared to prior exam. Lines and tubes are in unchanged position. XR CHEST PORTABLE    Result Date: 7/8/2022  Catheter placed with significant right lung aeration improvement and no pneumothorax     XR CHEST PORTABLE    Result Date: 7/8/2022  1.  There is no interval change in the bilateral perihilar and lower lobe airspace disease more prominent within the right lung. 2. Moderate size right pleural effusion. 3. There is no pneumothorax. XR CHEST PORTABLE    Result Date: 7/7/2022  Increasing infiltrate throughout the right lung persistent left basilar alveolar infiltrate is well. XR CHEST PORTABLE    Result Date: 7/6/2022  Adequately positioned right internal jugular central venous line. Otherwise, no significant change compared to prior exam glued in lines and tubes. CT GUIDED CHEST TUBE    Result Date: 7/8/2022  Successful uncomplicated  right chest tube placement Recommendations: 1. Follow-up tube check in 2 weeks 2. Flush tube daily with 5 to 10 mL of sterile saline     XR ABDOMEN FOR NG/OG/NE TUBE PLACEMENT    Result Date: 7/8/2022  Catheter is in the stomach. US RETROPERITONEAL COMPLETE    Result Date: 7/7/2022  1. Marked bilateral renal cortical thinning. Echogenic renal parenchyma. No hydronephrosis. 2.  A Diaz catheter appears to be decompressing the bladder. MRI BRAIN WO CONTRAST    Result Date: 7/8/2022  1. No acute intracranial abnormality. 2. No gross epileptogenic lesion is identified. 3. Bilateral mastoid effusions, which may be due to eustachian tube dysfunction or otomastoiditis.  RECOMMENDATIONS: Unavailable     US DUP LOWER EXTREMITIES BILATERAL VENOUS    Result Date: 7/6/2022  Within the visualized vessels there is no evidence for deep venous thrombosis                Notable Cultures:      Blood cultures   Blood Culture, Routine   Date Value Ref Range Status   07/05/2022 5 Days no growth  Final     Respiratory cultures No results found for: RESPCULTURE   Gram Stain Result   Date Value Ref Range Status   07/08/2022 Refer to ordered Gram stain for results  Final     Urine   Creatinine Ur POCT   Date Value Ref Range Status   06/17/2019 50 mg/dl  Final     Urine Culture, Routine   Date Value Ref Range Status   07/05/2022 Growth not present  Final Legionella No results found for: LABLEGI  C Diff PCR No results found for: CDIFPCR  Wound culture/abscess: No results for input(s): WNDABS in the last 72 hours. Tip culture:  Recent Labs     07/12/22  1702   CXCATHTIP Growth not present, incubation continues        Antibiotic  Days  Day started                                  OXYGENATION: 2+ liter on nasal canula    DIET: Minced and moist      Resident's Assessment and Plan     Isaac Hands is a 66 y.o. female with  has a past medical history of A-fib ([facility] [address] Acute on chronic congestive heart failure ([facility] [address] Anxiety, Asthma, CAD (coronary artery disease), Cancer ([facility] [address] Chronic kidney disease, Depression, Diabetes mellitus ([facility] [address] H/O mammogram, Hx MRSA infection, Hyperlipidemia, Hypertension, Lateral epicondylitis, SERENA on CPAP, Parkinson's disease ([facility] [address] and Tubal ligation status. came here with CC   Chief Complaint   Patient presents with    Altered Mental Status     per ems family reports ams x 45 minutes, recent psych med changes and right toe amputation        SUMMARY OF HOSPITAL STAY: Briefly, Ms. Josep Gibson 65 yo  Female admitted with c/c of AMS! She was kept in vasopressor Noradrenaline (levophed). Labs showed Potassium of 5.5, with Urea 95, Cr 3.3, Calcium 7.7, troponin 57, high digoxin level 3.7, lactic acid 2.8, Hb 10.2, PT 33.7, INR 3.1, aPTT 42.9, lipase of 148 and negative routine urine examination. She was managed with 0.95 NS bolus at 30ml/kg, digibind, calcium gluconate, hydrocortisone 100mg, Zosyn 4.5 mg and vancomycin 2000mg and planned to be shifted to ICU.  In the ICU she developed confusion, started Carbidopa-Levodopa, Seroquel, she responded very well, then transferred to in-patient.      Days since admission: 8    Consults:   Pulmonology  Cardiology  Palliative care  Critical care    Assessment and Plan:  Acute encephalopathy, multifactorial 2/2 shock, digoxin toxicity, uremia, hyperammonemia, recent change of medications; r/o other causes   -Patient is responsive, improving very well  -Monitor for clinical improvement  2. Acute Hypoxic Respiratory Failure s/p extubation   -Patient has cough spell  - Patient on nasal cannula  -2 liter of o2  - Monitor respiratory status  3. Right pleural effusion  - chest tube in place on water seal  - Drainage since 24 hours: 350  - Fluid is clear  4. Shock, likely septic -resolved  -Vitals stable since off pressor  -Continue Cefepime  -Monitor pro-calcitonin  5. Oropharyngeal dysphagia (moderate)  -She doesn't have difficulty swallowing.   -Minced and moist consistency solid  -No straw  4. Elevated troponin likely 2/2 demand ischemia -troponin 57 -> 53; EKG showed no ischemic changes  4. Atrial fibrillation,   -eliquis on hold due to concerns for bleeding with severe thrombocytopenia  5. Thrombocytopenia   - Platelet count is improving (13<60)  -2/2 zosyn vs other causes  -Hem/Onc doesn't think it's a concern now. If it doesn't improve, patient will require a bone marrow exam.  5. Bradycardia likely 2/2 digoxin toxicity in the setting of SHANTANU  -Resolved  6. SHANTANU Stage III on CKD - improving; Cr. 1.2<1.1; Bun 45<35  -Will monitor BMP  7. Hyperkaliemia  -3.8<3.7  -Resolved  8. Hx of depression/anxiety  - On Seroquel 25 mg BID  9. Hx of Parkinson's disease  - Carbidopa-Levodopa restarted  10. Hx HTN  -Metoprolol increased 25<50  -Monitor BP  11. Hypernatremia  -Resolved  -Diuresis on hold  -Monitor BMP  12. Lactic acidosis  -Resolved    Problems resolved during this admission:   1. Acute encephalopathy, multifactorial 2/2 septic shock, digoxin toxicity, uremia,resolved  2. Shock, likely septic (HAP), digoxin toxicity  3. Hyperkalemia 2/2 SHANTANU, resolved.    4. Lactic acidosis: resolved        PT/OT: Speech therapy    GI ppx: Pepcid 10 mg      Next of Kin/ POA:  Daughter    Code Status:   Full    Disposition: Continue current medication      Gretta Stafford MD, PGY-1  Attending physician: Dr. Christel Moore NOTE: This report was transcribed using voice recognition software. Every effort was made to ensure accuracy; however, inadvertent computerized transcription errors may be present. Senior Resident Addendum:  I have seen and examined the patient with the intern. I have discussed the case, including pertinent history and exam findings with the intern. I agree with the assessment, plan and orders as documented above with the following additions:      Patient seen and examined at bedside this morning in no acute distress. She has been transferred out of the ICU. She was awake, alert and orientedx3. She remembers events that brought her to the hospital. Shruthi Mcpherson continues to improve. No active complaints at this time. She continues to have cough but denies any fever, shortness of breath, chest pain, abdominal pain, nausea, vomiting, diarrhea. Chest tube still in place on water seal with clear yellowish fluid output. Course breath sounds noted on auscultation. No wheezing noted.     Acute encephalopathy, multifactorial 2/2 shock, digoxin toxicity, uremia, hyperammonemia, recent change of medications; r/o other causes - improving  Came in for AMS with hypotension not improved with fluids  Dig level 3.7  Ammonia 69  BUN 95 on presentation  CT head negative for any acute abnormality  Seizure-like activity  MRI brain negative for intracranial abnormality  EEG still pending  No recurrence of seizure-like activity  Shock, likely septic (unknown etiology, likely HAP) vs cardiogenic (pericardial effusion on CT) - resolved  Concerns for septic shock; although no WBC or fever but pt presented with hypotension, AMS; Procal 0.22, unclear source of infection likely pneumonia; respi cx growing Klebsiella  Concerns for cardiogenic; small pericardial effusion seen on imaging, possible acute exacerbation of HF  Echo showed EF 70-75%, indeterminate DD, normal LV size and function with no pericardial effusion noted  Elevated troponin likely 2/2 demand ischemia -troponin 57 -> 53; EKG showed no ischemic changes  Atrial fibrillation, on eliquis 5mg BID and metoprolol 50mg BID at home  Bradycardia likely 2/2 digoxin toxicity in the setting of SHANTANU - resolved  Pulmonary HTN likely Type II 2/2 acute decompensated heart failure  Acute hypoxic hypercapnic respiratory failure 2/2 pleural effusion, possible HAP vs pericardial effusion vs acute HFpEF exacerbation - improving  Pleural effusion as well as small pericardial effusion seen on imaging  proBNP 41,368  PE ruled out, US BLE negative for DVT  S/p IR pigtail catheter placement, 950cc fluid taken out; fluid analysis c/w transudative with fluid/serum LDH 0.59 and PF/serum protein 0.41  S/p extubation 7/10/22; currently on 2L NC  Acute pancreatitis; lipase 148; CT A/P showed mild stranding around the mesentery concerning for pancreatitis versus volume overload, although overall appearance of pancreas itself is grossly unremarkable.   SHANTANU Stage III on CKD - improving - baseline Crea 0.8-0.9, sCr 3.3 on presentation -> 1.8 today; FEUrea: 6.5% -> prerenal likely multifactorial, prerenal (cardiorenal syndrome, dehydration) vs ATN in the setting of septic shock  Hyperkalemia - resolved  HAGMA 2/2 lactic acidosis, uremia AG 15 -> 23; lactic acid and uremia elevated  Elevated INR  Thrombocytopenia likely 2/2 sepsis vs Zosyn use - improving    Hx of Type 2 DM with neuropathy, lantus 10 and sliding scale at home  Hx of asthma, on montelukast 10mg daily  Hx of SERENA  Hx of HTN   Hx of CAD  Hx of HFpEF (EF  60% on echo in 4/2022); on furosemide 20mg daily and metoprolol 50mg BID  Hx of hypothyroidism   Hx of depression/anxiety, on seroquel 25mg in AM and 50mg in PM; divalproex 250mg BID  Hx of restless leg syndrome, on ropinirole 1mg TID at home  Hx of Parkinson's disease, was previously on carbi-dopa; discontinued upon discharge at [address]:  Weaning sedation, monitor mentation  Follow EEG results  Depakote for agitation if needed per neurology recs  Monitor BP  Continue amiodarone and lopressor 50 mg BID for A fib RVR  Follow thoracentesis results  Continue chest tube  Follow CXR  Removal of chest tube per pulmonology  Continue Cefepime to complete 10 days of antibiotics (currently at day 9)  Diuresis per nephro. Holding diuresis for now due to hypernatremia and contraction alkalosis. Likely to resume tomorrow or in 48 hours  Continue on lantus 5u and LDSS  Monitor BG q4h  Hypoglycemia protocol in place  Eliquis and aspirin on hold due to thrombocytopenia  Heme/Onc consulted, appreciate recommendations  Continue Seroquel 25mg BID   Continue Carbidopa-levodopa TID  Appreciate input from specialties  Discharge to SNF/[facility]. Awaiting family's choice of facility.        Nikki Crocker MD  7/13/2022  9:43 PM

## 2022-07-13 NOTE — CARE COORDINATION
7/13:  Update CM Note:  CM called son Ann Najera to make sure he recd the list of SNF/[facility] for choices. Pending a call back.  Electronically signed by Vern Stewart RN on 7/13/2022 at 8:54 AM

## 2022-07-13 NOTE — PROGRESS NOTES
Chart reviewed. Saw patient at the bedside. Patient was transferred out of the ICU yesterday. We discussed goal of care, patient wishes to continue with current medical treatment, plan is for patient to go to a short-term rehab, prior to returning home. She wishes to remain a full code, and will let her children decide about long-term vent support. Goal of care and CODE STATUS has been established. No further PM needs has been identified. Going to sign off for now. Please reconsult if new PM needs arises.

## 2022-07-13 NOTE — PROGRESS NOTES
[place]  Department of Internal Medicine  Division of Pulmonary, Critical Care and Sleep Medicine  Progress Note    Jose Alberto Gatica DO, MPH, Ena Dural, [address] [facility] Torres CRABTREE MD      Patient: Golden Flores  MRN: [id number]  : 1950    Encounter Time: 7:16 PM     Date of Admission: 2022 12:25 PM    Primary Care Physician: No primary care provider on file. Reason for Consultation: Drain CT management     SUBJECTIVE:    Ok  Improving    OBJECTIVE:     PHYSICAL EXAM:   VITALS:   Vitals:    22 2154 22 0603 22 0749 22 1441   BP: 118/69  127/69 108/63   Pulse: 95  98 88   Resp: 20  18 17   Temp: 98.1 °F (36.7 °C)  97.5 °F (36.4 °C) 98.8 °F (37.1 °C)   TempSrc: Oral  Oral Oral   SpO2: 96%   93%   Weight:  238 lb 3.2 oz (108 kg)     Height:            Intake/Output Summary (Last 24 hours) at 2022 1916  Last data filed at 2022 1721  Gross per 24 hour   Intake 240 ml   Output 835 ml   Net -595 ml        CONSTITUTIONAL:    Alert  SKIN:     No rash,   HEENT:     EOMI, MMM, No thrush  NECK:    No bruits, No JVP appreciated  CV:      Sinus,  No murmur, No rubs, No gallops  PULMONARY:   Couse BS,  No Wheezing, No Rales, No Rhonchi      CT noted  ABDOMEN:     Soft, non-tender. BS normal. No R/R/G  EXT:    No deformities . No clubbing. Edema ower extremity edema, No venous stasis  PULSE:   Appears equal and palpable.   PSYCHIATRIC:  Seems appropriate, No acute psychosis  MS:    Gross weakness  NEUROLOGIC:   The clinical assessment is non-focal     DATA: IMAGING & TESTING:     LABORATORY TESTS:    CBC:   Lab Results   Component Value Date/Time    WBC 6.9 2022 05:02 AM    RBC 3.25 2022 05:02 AM    HGB 8.7 2022 05:02 AM    HCT 29.4 2022 02:25 PM    MCV 91.7 2022 05:02 AM    MCH 26.8 2022 05:02 AM    MCHC 29.2 2022 05:02 AM    RDW 15.2 2022 05:02 AM    PLT 60 07/13/2022 05:02 AM    MPV 13.1 07/13/2022 05:02 AM     CMP:    Lab Results   Component Value Date/Time     07/13/2022 05:02 AM    K 3.5 07/13/2022 05:02 AM    K 5.0 07/06/2022 02:41 AM     07/13/2022 05:02 AM    CO2 24 07/13/2022 05:02 AM    BUN 35 07/13/2022 05:02 AM    CREATININE 1.1 07/13/2022 05:02 AM    GFRAA 59 07/13/2022 05:02 AM    LABGLOM 49 07/13/2022 05:02 AM    GLUCOSE 157 07/13/2022 05:02 AM    PROT 5.8 07/13/2022 05:02 AM    LABALBU 3.5 07/13/2022 05:02 AM    CALCIUM 8.6 07/13/2022 05:02 AM    BILITOT 1.1 07/13/2022 05:02 AM    ALKPHOS 81 07/13/2022 05:02 AM    AST 25 07/13/2022 05:02 AM    ALT <5 07/13/2022 05:02 AM     Calcium:    Lab Results   Component Value Date/Time    CALCIUM 8.6 07/13/2022 05:02 AM     Ionized Calcium:  No results found for: IONCA  Magnesium:    Lab Results   Component Value Date/Time    MG 2.2 07/13/2022 05:02 AM     Phosphorus:    Lab Results   Component Value Date/Time    PHOS 2.0 07/13/2022 05:02 AM     PT/INR:    Lab Results   Component Value Date/Time    PROTIME 15.4 07/13/2022 05:02 AM    INR 1.4 07/13/2022 05:02 AM        PRO-BNP:   Lab Results   Component Value Date    PROBNP 37,248 (H) 07/12/2022    PROBNP 41,368 (H) 07/06/2022      ABGs:   Lab Results   Component Value Date/Time    PH 7.452 07/11/2022 05:07 AM    PO2 133.6 07/11/2022 05:07 AM    PCO2 40.0 07/11/2022 05:07 AM     Hemoglobin A1C: No components found for: HGBA1C    IMAGING:  Imaging tests were completed and reviewed and discussed radiology and care team involved and reveals   Echo Complete    Result Date: 7/7/2022  Findings   Left Ventricle  Micro-bubble contrast injected to enhance left ventricular visualization. Normal left ventricular size and systolic function. Ejection fraction is visually estimated at 70-75%. Indeterminate diastolic function. No regional wall motion abnormalities seen. Mild left ventricular concentric hypertrophy noted.    Right Ventricle  Moderately dilated right ventricle. Right ventricle global systolic function is reduced. TAPSE 1.1 cm. Left Atrium  The left atrium is moderate-severely dilated. JUANY 48 ml/m2. The interatrial septum appears intact. Right Atrium  Mildly enlarged right atrium. Mitral Valve  Structurally normal mitral valve. No evidence of mitral valve stenosis. Physiologic mitral regurgitation. Tricuspid Valve  The tricuspid valve appears structurally normal.  Mild tricuspid regurgitation. RVSP is at least 60 mmHg. Aortic Valve  Individual aortic valve leaflets are not clearly visualized. No hemodynamically significant aortic stenosis is present. No evidence of aortic valve regurgitation. Pulmonic Valve  The pulmonic valve was not well visualized. No evidence of pulmonic valve stenosis. No evidence of any pulmonic regurgitation. Pericardial Effusion  Prominent pericardial fat pad. No definitive evidence of pericardial effusion. Aorta  Aortic root dimension within normal limits. Miscellaneous  Dilated Inferior Vena Cava. Conclusions   Summary  Technically difficult study - limited visualization. Micro-bubble contrast injected to enhance left ventricular visualization. Normal left ventricular size and systolic function. Ejection fraction is visually estimated at 70-75%. Indeterminate diastolic function. No regional wall motion abnormalities seen. Mild left ventricular concentric hypertrophy noted. Moderately dilated right ventricle with reduced function. Biatrial dilation. Mild tricuspid regurgitation. RVSP is at least 60 mmHg. Prominent pericardial fat pad. No definitive evidence of pericardial effusion. Result Date: 7/5/2022  FINDINGS: Lower Chest: See the CT report of the chest dated the same date for full details. Organs: The liver is grossly unremarkable. The spleen is unremarkable. Gallbladder is been surgically removed. The pancreas reveals some mild fatty replacement.   Both adrenal glands are within normal limits. The kidneys are unremarkable in appearance. GI/Bowel: Stomach is within normal limits. No mucosal abnormality. Stool seen scattered diffusely throughout the colon. There is no wall thickening. No mucosal abnormality or distension of the small bowel. Pelvis: Pelvic organs reveal mild wall thickening of the bladder with incomplete distension. The uterus is grossly unremarkable. Peritoneum/Retroperitoneum: There is no abdominal retroperitoneal lymphadenopathy. There is mild stranding identified in the root of the mesentery as well as surrounding the pancreas in which mild inflammation of the pancreas cannot be completely excluded. Correlation to clinical lab values is recommended. No pelvic adenopathy with moderate atherosclerotic disease seen within the abdominal aorta and iliac vessels. Free fluid identified throughout the upper abdomen. Extensive vascular calcifications seen within the mesenteric vessels as well as the abdominal aorta. Bones/Soft Tissues: The bony structures reveal extensive degenerative changes seen within the spine and pelvis. Severe degenerative changes seen within the sacroiliac joints bilaterally right greater than left some sclerosis on the right to suggest prior healed sacroiliitis. There is extensive anasarca seen within the subcutaneous tissues. Increasing fluid within the abdomen when compared to the prior study. The anasarca is also increased in appearance of the prior examination. Mild stranding seen around the mesentery which correlation to clinical lab values is recommended to exclude possible pancreatitis or volume overload. Overall the appearance of the pancreas itself is grossly unremarkable. There is only mild stranding seen throughout the mesenteric root. CT HEAD WO CONTRAST  Result Date: 7/5/2022  No acute intracranial abnormality or significant change in the overall appearance of the brain.  MRI would be useful if symptoms persist. RECOMMENDATIONS: Unavailable     CT CHEST WO CONTRAST    Result Date: 7/5/2022  FINDINGS: Mediastinum: The cardiac size is enlarged. There is a small pericardial effusion. Minimal probable gas in the right atrium is likely secondary to recent intravenous injection. The esophagus is normal.  No definite mediastinal masses or lymphadenopathy. Lungs/pleura: There is consolidation posterior right upper lobe and right middle lobe with linear density in the lingula and at the left posterior lung base. There is right lower lobe consolidation and moderate right pleural effusion. Upper Abdomen: There is a small volume of right upper quadrant ascites. There are surgical clips in the gallbladder fossa and right upper anterior abdominal wall. Soft Tissues/Bones: There is mild induration of the subcutaneous fat. There is moderate lower thoracic spondylosis with slight levoconvex curvature of the thoracic spine. No definite lytic or blastic osseous lesions. Bilateral lung infiltrates with greatest consolidation right lower lobe probably secondary to atelectasis and or pneumonia. Moderate right pleural effusion. Cardiomegaly and small pericardial effusion. The pericardial effusion is new. Small volume right upper abdominal ascites along with anasarca. This is new. CT GUIDED CHEST TUBE  Result Date: 7/8/2022  [facility] After obtaining informed consent, following the routine sterile prep and drape and after the administration of local anesthesia a needle was inserted into the right pleural space . Serous fluid was aspirated. Subsequently a guidewire was passed through the needle. Subsequently the needle was removed and over the guidewire the tract was dilated. Following dilatation a locking loop catheter was placed. Post procedure CT scan reveals the tube to be in good position. Specimen was sent to the laboratory. The patient tolerated the procedure well. There were no complications.  Prior to the procedure a timeout occurred at  1542 hours. The patient received 9 second  of  fluoroscopy. The patient received local anesthesia. The patient was monitored for 60 minutes by a registered nurse. Successful uncomplicated  right chest tube placement Recommendations: 1. Follow-up tube check in 2 weeks 2. Flush tube daily with 5 to 10 mL of sterile saline     MRI BRAIN WO CONTRAST  Result Date: 7/8/2022  FINDINGS: INTRACRANIAL STRUCTURES/VENTRICLES: There is no acute infarct. No mass effect, edema or hemorrhage is seen. Mild volume loss is seen in the cerebrum with mild chronic microvascular ischemic changes. No hydrocephalus or extra-axial fluid is seen. ORBITS: Prosthetic lenses are seen in the globes bilaterally. The orbits are otherwise grossly unremarkable. SINUSES: Mild mucosal thickening in the paranasal sinuses. There is pooling of secretions in the nasopharynx. Moderate bilateral mastoid effusions. BONES/SOFT TISSUES: The bone marrow signal intensity appears normal. The soft tissues demonstrate no acute abnormality. 1.  No acute intracranial abnormality. 2. No gross epileptogenic lesion is identified. 3. Bilateral mastoid effusions, which may be due to eustachian tube dysfunction or otomastoiditis. RECOMMENDATIONS: Unavailable     US DUP LOWER EXTREMITIES BILATERAL VENOUS  Within the visualized vessels there is no evidence for deep venous thrombosis       Assessment: 1. Acute hypoxic hypercapnic respiratory failure 2/2 mod R pleural effusion, possible HAP vs acute decompensated  HFpEF exacerbation (EF 70-75%)  2. Pulmonary hypertension likely Type II 2/2 acute decompensated diastolic heart failure, in the setting of Hx SERENA  3. Moderate R pleural effusion, likely 2/2 HAP (Klebsiella pneumoniae) vs acute decompensated heart failure. D4 CT, 250 cc output in 24 hrs  4. HAP (Klebsiella pneumoniae on resp culture, light growth). D1 cefepime  5.  Elevated pro-BNP, multifactorial, likely acute decompensated HFpEF, septic shock.   6. Elevated troponin likely 2/2 demand ischemia  7. Permanent atrial fibrillation, s/p DCCV (April and May 2022), now rate controlled. 8. SHANTANU Stage III on CKD multifactorial, pre-renal (hemodynamically mediated, DHN 2/2 diuretic use, poor oral intake); vs ATN 2/2 septic shock. 9. Hypernatremia likely 2/2 over diuresis  10. Metabolic alkalosis likely contraction alkalosis 2/2 diuresis, poor oral intake  11. Pancytopenia likely 2/2 BM suppression 2/2 chronic illness, meds (Zosyn)  12. Acute on chronic macrocytic anemia, multifactorial, likely 2/2 chronic disease, blood draws,  Less likely hemolysis (no schistocytes, haptoglobin wnl) s/p 1 unit PRBC 7/11, FOBT positive today, with pinkish tinge on chest tube output  13. Thrombocytopenia,likely 2/2 #11, less likely HIT (not exposed to heparin products), DIC, hemolysis ruled out (work-up negative)  14. Prolonged INR, ? HFP wnl, no overt bleeding, apixaban and aspirin on hold. Given Vit K yesterday for INR 3.2>2 today  15. Low grade fever, multifactorial, ? meds (Precedex?), CLABSI?, no new leukocytosis, or hemodynamic instability   16. Hx of asthma  17. Hx of SERENA. Not using CPAP at home  18. Hx of HTN. -160`s  19. Hx of CAD  20. Hx of Type 2 DM with neuropathy. On Lantus 10 and Novolog SS at home  21. Hx of depression/anxiety. 22. Hx of restless leg syndrome  23. Hx of Parkinson's disease. On Sinemet and ropinirole at home, which was discontinued upon discharge at [facility]:   1. CXR needed  2.  If ok will remove catheter      Maira Holland DO DO, MPH, [facility]  Professor of Internal Medicine  Pulmonary, Critical Care and Sleep Medicine

## 2022-07-13 NOTE — PROGRESS NOTES
[facility]  Internal Medicine Residency / [address]    Attending Physician Statement  I have discussed the case, including pertinent history and exam findings with the resident and the team.  I have seen and examined the patient and the key elements of the encounter have been performed by me. I agree with the assessment, plan and orders as documented by the resident. Case Discussed During AM Rounds   Transferred from ICU yesterday    H/H remains stable   Platelets, WBCs all improving in last 24 hours     BP improved and respiratory status remains stable    Significant improvement in mental status this am- alert, oriented, comfortable, answers all questions appropriately with resumption of seroquel and elimination of precedex   Continue to hold home depakote at this time    DC planning to likely [facility]- PT/OT assessment needed     Acute Encephalopathy - significantly improving    Off precedex   Seroquel was resumed on 7/12    Mentation improved   NO agitation    Continue to monitor at this time     Right Pleural Effusion with catheter in place for drainage   Chest tube currently to waterseal- continue to monitor with potential removal soon    Continue to monitor   Pulmonary following     Moderate Oropharyngeal dysphagia   Speech therapy assessment appreciated    Modified meal plan noted- dysphagia protocol: Minced and moist consistency solids (IDDSI level 5) with  thin liquids NO STRAW   Currently tolerating diet     Septic Shock likely secondary to Pneumonia- resolved    Hemodynamically stable   Continue atbs    Cultures reviewed- on cefepime due to adjustment of cultures reviewed   ?  Length of atbs at this time     Pancytopenia complicated by severeThrombocytopenia   Platelets have improved above 50 today    WBCs within normal limits    H/H stable    NO signs of bleeding    Anticoagulation has been on hold and will re-assess when more stable to resume as able- currently remain on hold due to bleeding risk     Hypernatremia- resolved     HTN- B-blocker has been increased   Continue to monitor BP readings     Acute Hypoxic Respiratory Failure s/p extubation- stable    Continue to monitor respiratory function    Respiratory status currently stable    -7.5 L since admission   Diuresis was on hold due to hypernatremia   Continue to monitor volume status     Atrial Fibrillation    Holding anticoagulation     Hx of Parkinson's Disease on presentation    Sinemet has been resumed and follow for any concerns    Lactic Acidosis- resolved       Disposition- continue inpatient management- DC planning to [facility] when medically stable     Remainder of medical problems as per resident note.       Cyrus Jung MD, [address]   Internal Medicine Residency Faculty

## 2022-07-13 NOTE — PROGRESS NOTES
Physical Therapy  Physical Therapy Initial Assessment     Name: Anastacio Ley  : 1950  MRN: [id number]      Date of Service: 2022    Evaluating PT:  Dipika Rodriguez PT, DPT [id number]    Room #:  8095/4531-W  Diagnosis:  Shock ([facility] [address] [R57.9]  Uremia [N19]  SHANTANU (acute kidney injury) ([facility] [address] [N17.9]  Poisoning by digoxin, accidental or unintentional, initial encounter [T46.0X1A]  Altered mental status, unspecified altered mental status type [R41.82]  Acute pancreatitis, unspecified complication status, unspecified pancreatitis type [K85.90]  PMHx/PSHx:  CHF, a fib, anxiety, asthma, CAD, CKD, DM, HLD, HTN, SERENA, parkinson's disease, tubal ligation status  Procedure/Surgery:  Extubated 7/10  Precautions:  Falls, TSM, FMS, O2, R chest tube  Equipment Needs:  TBD    SUBJECTIVE:    Pt lives alone in an apartment with level entry and elevator access. Bed is on 1st floor and bath is on 1st floor. Pt ambulated with rollator PTA. Pt reports having conversation with son about transitioning to assisted living, but has not yet been able to. OBJECTIVE:   Initial Evaluation  Date: 22 Treatment Short Term/ Long Term   Goals   AM-PAC 6 Clicks      Was pt agreeable to Eval/treatment? yes     Does pt have pain?  L hip pain during mobility     Bed Mobility  Rolling: MaxA x2  Supine to sit: MaxA x2  Sit to supine: maxA x2  Scooting: MaxA x2  Rolling: Shell  Supine to sit: Shell  Sit to supine: Shell  Scooting: Shell   Transfers Sit to stand: Shell x2  Stand to sit: Shell x2  Stand pivot: NT  Sit to stand: SBA  Stand to sit: SBA  Stand pivot: SBA with [facility]   Ambulation    NT, unable  50 feet with [facility] SBA   Stair negotiation: ascended and descended  NT  TBD   ROM BUE:  Defer to OT note  BLE:  WFL     Strength BUE:  Defer to OT note  BLE:  B hip flex 3/5, B knee ext 3+/5, B dorsiflex 3+/5  WFL   Balance Sitting EOB:  SBA<>Shell  Dynamic Standing:  hSell x2 with [facility]  Sitting EOB:  Independent   Dynamic Standing:  SBA with [facility] Pt is A & O x 3  Sensation:  NT  Edema:  None noted    Vitals:  SPO2 on 1.5L: 100%  Patient education  Pt educated on role of PT, sequencing for mobility    Patient response to education:   Pt verbalized understanding Pt demonstrated skill Pt requires further education in this area   yes yes yes     ASSESSMENT:    Conditions Requiring Skilled Therapeutic Intervention:    [x]Decreased strength     [x]Decreased ROM  [x]Decreased functional mobility  [x]Decreased balance   [x]Decreased endurance   []Decreased posture  []Decreased sensation  []Decreased coordination   []Decreased vision  []Decreased safety awareness   []Increased pain       Comments:  Patient semi-supine in bed upon entry and agreeable to PT evaluation. Pt anxious about mobility -- encouragement provided. Pt consistently labile during session requiring cues for attention to task. Heavy assist of two for bed mobility. Pt dizzy at EOB -- resolved with rest. Pt reluctant to attempt standing requiring further encouragement. STS with light assist of two for balance as well as line management. During attempt to step, pt experienced minor LOB and declined further attempts. Pt noted to be soiled of bowel despite FMS. New pad placement during standing. Assisted pt back to bed at end of session with all needs met. RN aware of FMS malfunctioning. Patient to benefit from continued skilled PT at [facility] to improve functional mobility and decrease fall risk. Treatment:  Patient practiced and was instructed in the following treatment:     Bed Mobility: VCs provided for sequencing and safety during mobility. Manual assist provided for completion of task.  Transfer Training: Verbal and tactile cueing provided for sequencing and safety during mobility. Manual assist provided for completion of task   Therapeutic Activities: pt sat EOB for approximately 15 minutes during session requiring intermittent light balance assist    Pt's/ family goals   1.  None stated    Prognosis is good for reaching above PT goals. Patient and or family understand(s) diagnosis, prognosis, and plan of care. yes    PHYSICAL THERAPY PLAN OF CARE:    PT POC is established based on physician order and patient diagnosis     Referring provider/PT Order:    07/13/22 0815  PT eval and treat  Start:  07/13/22 0815,   End:  07/13/22 0815,   ONE TIME,   Standing Count:  1 Occurrences,   Mayra Barrera MD       Diagnosis:  Shock ([facility] [address] [R57.9]  Uremia [N19]  SHANTANU (acute kidney injury) ([facility] [address] [N17.9]  Poisoning by digoxin, accidental or unintentional, initial encounter [T46.0X1A]  Altered mental status, unspecified altered mental status type [R41.82]  Acute pancreatitis, unspecified complication status, unspecified pancreatitis type [K85.90]  Specific instructions for next treatment:  Progress mobility as appropriate    Current Treatment Recommendations:     [x] Strengthening to improve independence with functional mobility   [x] ROM to improve independence with functional mobility   [x] Balance Training to improve static/dynamic balance and to reduce fall risk  [x] Endurance Training to improve activity tolerance during functional mobility   [x] Transfer Training to improve safety and independence with all functional transfers   [x] Gait Training to improve gait mechanics, endurance and assess need for appropriate assistive device  [] Stair Training in preparation for safe discharge home and/or into the community   [] Positioning to prevent skin breakdown and contractures  [x] Safety and Education Training   [x] Patient/Caregiver Education   [] HEP  [x] Other     PT long term treatment goals are located in above grid    Frequency of treatments: 2-5x/week x 1-2 weeks.     Time in  1310  Time out  1350    Total Treatment Time  23 minutes     Evaluation Time includes thorough review of current medical information, gathering information on past medical history/social history and prior

## 2022-07-14 LAB
ALBUMIN SERPL-MCNC: 3.5 G/DL (ref 3.5–5.2)
ALP BLD-CCNC: 83 U/L (ref 35–104)
ALT SERPL-CCNC: <5 U/L (ref 0–32)
ANION GAP SERPL CALCULATED.3IONS-SCNC: 10 MMOL/L (ref 7–16)
ANISOCYTOSIS: ABNORMAL
APTT: 25.9 SEC (ref 24.5–35.1)
AST SERPL-CCNC: 25 U/L (ref 0–31)
BASOPHILS ABSOLUTE: 0.07 E9/L (ref 0–0.2)
BASOPHILS RELATIVE PERCENT: 0.9 % (ref 0–2)
BILIRUB SERPL-MCNC: 0.9 MG/DL (ref 0–1.2)
BUN BLDV-MCNC: 29 MG/DL (ref 6–23)
CALCIUM SERPL-MCNC: 8.3 MG/DL (ref 8.6–10.2)
CHLORIDE BLD-SCNC: 104 MMOL/L (ref 98–107)
CO2: 29 MMOL/L (ref 22–29)
CREAT SERPL-MCNC: 1.2 MG/DL (ref 0.5–1)
EOSINOPHILS ABSOLUTE: 0.35 E9/L (ref 0.05–0.5)
EOSINOPHILS RELATIVE PERCENT: 4.3 % (ref 0–6)
ERYTHROPOIETIN: 222 MU/ML (ref 4–27)
GFR AFRICAN AMERICAN: 53
GFR NON-AFRICAN AMERICAN: 44 ML/MIN/1.73
GLUCOSE BLD-MCNC: 205 MG/DL (ref 74–99)
HCT VFR BLD CALC: 27.9 % (ref 34–48)
HEMOGLOBIN: 8.2 G/DL (ref 11.5–15.5)
INR BLD: 1.3
LYMPHOCYTES ABSOLUTE: 0.97 E9/L (ref 1.5–4)
LYMPHOCYTES RELATIVE PERCENT: 12.2 % (ref 20–42)
MAGNESIUM: 2 MG/DL (ref 1.6–2.6)
MAGNESIUM: 2 MG/DL (ref 1.6–2.6)
MCH RBC QN AUTO: 26.5 PG (ref 26–35)
MCHC RBC AUTO-ENTMCNC: 29.4 % (ref 32–34.5)
MCV RBC AUTO: 90 FL (ref 80–99.9)
METAMYELOCYTES RELATIVE PERCENT: 0.9 % (ref 0–1)
METER GLUCOSE: 119 MG/DL (ref 74–99)
METER GLUCOSE: 143 MG/DL (ref 74–99)
METER GLUCOSE: 180 MG/DL (ref 74–99)
MONOCYTES ABSOLUTE: 0.4 E9/L (ref 0.1–0.95)
MONOCYTES RELATIVE PERCENT: 5.2 % (ref 2–12)
MYELOCYTE PERCENT: 2.6 % (ref 0–0)
NEUTROPHILS ABSOLUTE: 6.24 E9/L (ref 1.8–7.3)
NEUTROPHILS RELATIVE PERCENT: 73.9 % (ref 43–80)
OVALOCYTES: ABNORMAL
PDW BLD-RTO: 15.5 FL (ref 11.5–15)
PHOSPHORUS: 2.3 MG/DL (ref 2.5–4.5)
PHOSPHORUS: 2.9 MG/DL (ref 2.5–4.5)
PLATELET # BLD: 133 E9/L (ref 130–450)
PMV BLD AUTO: 12.3 FL (ref 7–12)
POIKILOCYTES: ABNORMAL
POLYCHROMASIA: ABNORMAL
POTASSIUM SERPL-SCNC: 3.7 MMOL/L (ref 3.5–5)
PROTHROMBIN TIME: 15 SEC (ref 9.3–12.4)
RBC # BLD: 3.1 E12/L (ref 3.5–5.5)
SODIUM BLD-SCNC: 143 MMOL/L (ref 132–146)
TEAR DROP CELLS: ABNORMAL
TOTAL PROTEIN: 5.7 G/DL (ref 6.4–8.3)
WBC # BLD: 8.1 E9/L (ref 4.5–11.5)

## 2022-07-14 PROCEDURE — 84100 ASSAY OF PHOSPHORUS: CPT

## 2022-07-14 PROCEDURE — S5553 INSULIN LONG ACTING 5 U: HCPCS | Performed by: INTERNAL MEDICINE

## 2022-07-14 PROCEDURE — 94660 CPAP INITIATION&MGMT: CPT

## 2022-07-14 PROCEDURE — 6370000000 HC RX 637 (ALT 250 FOR IP): Performed by: INTERNAL MEDICINE

## 2022-07-14 PROCEDURE — 80053 COMPREHEN METABOLIC PANEL: CPT

## 2022-07-14 PROCEDURE — 85730 THROMBOPLASTIN TIME PARTIAL: CPT

## 2022-07-14 PROCEDURE — 85025 COMPLETE CBC W/AUTO DIFF WBC: CPT

## 2022-07-14 PROCEDURE — 6360000002 HC RX W HCPCS: Performed by: INTERNAL MEDICINE

## 2022-07-14 PROCEDURE — 97535 SELF CARE MNGMENT TRAINING: CPT

## 2022-07-14 PROCEDURE — 2580000003 HC RX 258: Performed by: INTERNAL MEDICINE

## 2022-07-14 PROCEDURE — 97530 THERAPEUTIC ACTIVITIES: CPT

## 2022-07-14 PROCEDURE — 2700000000 HC OXYGEN THERAPY PER DAY

## 2022-07-14 PROCEDURE — 2060000000 HC ICU INTERMEDIATE R&B

## 2022-07-14 PROCEDURE — 6370000000 HC RX 637 (ALT 250 FOR IP): Performed by: STUDENT IN AN ORGANIZED HEALTH CARE EDUCATION/TRAINING PROGRAM

## 2022-07-14 PROCEDURE — 36415 COLL VENOUS BLD VENIPUNCTURE: CPT

## 2022-07-14 PROCEDURE — 99232 SBSQ HOSP IP/OBS MODERATE 35: CPT | Performed by: INTERNAL MEDICINE

## 2022-07-14 PROCEDURE — 85610 PROTHROMBIN TIME: CPT

## 2022-07-14 PROCEDURE — 83735 ASSAY OF MAGNESIUM: CPT

## 2022-07-14 PROCEDURE — 94640 AIRWAY INHALATION TREATMENT: CPT

## 2022-07-14 PROCEDURE — 82962 GLUCOSE BLOOD TEST: CPT

## 2022-07-14 RX ORDER — FUROSEMIDE 10 MG/ML
40 INJECTION INTRAMUSCULAR; INTRAVENOUS ONCE
Status: COMPLETED | OUTPATIENT
Start: 2022-07-14 | End: 2022-07-14

## 2022-07-14 RX ADMIN — AMIODARONE HYDROCHLORIDE 200 MG: 200 TABLET ORAL at 09:32

## 2022-07-14 RX ADMIN — QUETIAPINE FUMARATE 25 MG: 25 TABLET ORAL at 22:07

## 2022-07-14 RX ADMIN — BUDESONIDE 500 MCG: 0.5 SUSPENSION RESPIRATORY (INHALATION) at 19:13

## 2022-07-14 RX ADMIN — CEFEPIME 2000 MG: 2 INJECTION, POWDER, FOR SOLUTION INTRAVENOUS at 14:23

## 2022-07-14 RX ADMIN — METOPROLOL TARTRATE 50 MG: 50 TABLET, FILM COATED ORAL at 09:32

## 2022-07-14 RX ADMIN — CARBIDOPA AND LEVODOPA 2 TABLET: 25; 100 TABLET, EXTENDED RELEASE ORAL at 14:18

## 2022-07-14 RX ADMIN — IPRATROPIUM BROMIDE AND ALBUTEROL SULFATE 1 AMPULE: .5; 2.5 SOLUTION RESPIRATORY (INHALATION) at 15:25

## 2022-07-14 RX ADMIN — Medication 5 MG: at 22:07

## 2022-07-14 RX ADMIN — INSULIN LISPRO 2 UNITS: 100 INJECTION, SOLUTION INTRAVENOUS; SUBCUTANEOUS at 09:32

## 2022-07-14 RX ADMIN — ATORVASTATIN CALCIUM 20 MG: 20 TABLET, FILM COATED ORAL at 09:32

## 2022-07-14 RX ADMIN — INSULIN LISPRO 1 UNITS: 100 INJECTION, SOLUTION INTRAVENOUS; SUBCUTANEOUS at 22:08

## 2022-07-14 RX ADMIN — IPRATROPIUM BROMIDE AND ALBUTEROL SULFATE 1 AMPULE: .5; 2.5 SOLUTION RESPIRATORY (INHALATION) at 19:13

## 2022-07-14 RX ADMIN — INSULIN GLARGINE-YFGN 5 UNITS: 100 INJECTION, SOLUTION SUBCUTANEOUS at 22:08

## 2022-07-14 RX ADMIN — INSULIN LISPRO 1 UNITS: 100 INJECTION, SOLUTION INTRAVENOUS; SUBCUTANEOUS at 17:21

## 2022-07-14 RX ADMIN — QUETIAPINE FUMARATE 25 MG: 25 TABLET ORAL at 09:32

## 2022-07-14 RX ADMIN — FAMOTIDINE 10 MG: 20 TABLET ORAL at 09:32

## 2022-07-14 RX ADMIN — IPRATROPIUM BROMIDE AND ALBUTEROL SULFATE 1 AMPULE: .5; 2.5 SOLUTION RESPIRATORY (INHALATION) at 08:00

## 2022-07-14 RX ADMIN — CARBIDOPA AND LEVODOPA 2 TABLET: 25; 100 TABLET, EXTENDED RELEASE ORAL at 09:31

## 2022-07-14 RX ADMIN — BUDESONIDE 500 MCG: 0.5 SUSPENSION RESPIRATORY (INHALATION) at 08:00

## 2022-07-14 RX ADMIN — CEFEPIME 2000 MG: 2 INJECTION, POWDER, FOR SOLUTION INTRAVENOUS at 02:59

## 2022-07-14 RX ADMIN — CARBIDOPA AND LEVODOPA 2 TABLET: 25; 100 TABLET, EXTENDED RELEASE ORAL at 22:07

## 2022-07-14 RX ADMIN — POLYETHYLENE GLYCOL 3350 17 G: 17 POWDER, FOR SOLUTION ORAL at 09:32

## 2022-07-14 RX ADMIN — METOPROLOL TARTRATE 50 MG: 50 TABLET, FILM COATED ORAL at 22:12

## 2022-07-14 RX ADMIN — SODIUM CHLORIDE, PRESERVATIVE FREE 10 ML: 5 INJECTION INTRAVENOUS at 09:33

## 2022-07-14 RX ADMIN — SODIUM CHLORIDE, PRESERVATIVE FREE 10 ML: 5 INJECTION INTRAVENOUS at 22:08

## 2022-07-14 RX ADMIN — FUROSEMIDE 40 MG: 10 INJECTION, SOLUTION INTRAMUSCULAR; INTRAVENOUS at 17:31

## 2022-07-14 NOTE — PROGRESS NOTES
[facility] MD Najma Hopkins MD   84 Ricardo Patel MD .  Progress note  Patient's Name: Chase Washingtoner  3:51 PM  7/14/2022 7/7 : seen in her room intubated mechanically ventilated fio2 40 . LE edema 1-2+ . On levophed . UO ok over last 24 hours     7/8 pt not in her room when coming to see her . She is in IR lab. Case d/w RN     7/9 seen in her room d/w ICU resident   Still on some pressors actually BP low when seeing her   Has CT in place with good drainage . Good UO in response to lasix via genao cath . Fio2 40 PEEP 8       7/10 seen in ICU intubated mechanically ventilated fio2 40 PEEP 8   1-2+ edema in UE and LEs   No pressors  On precedex drip     7/11 : seen in ICU extubated earlier today . BP stable on HFNC    7/12 seen in her room ,extubated , on o2/nc . Chest tube in place . Fevers today and precedex drip put on hold .good UO over last 24 hours     7/13 : seen in her room on o2/nc BP stable clinically doing better still with CT along with genao and fecal system    7/14 sitting in chair comfortable on o2/nc BP stable LE edema dependent . History of Present Ilness:      69-year old female with a past medical history of hypertension, A. fib, CAD, asthma, HFpEF, CKD, SERENA, hyperlipidemia, type II DM, anxiety/depression, Parkinson's disease, restless leg syndrome who presented in the ED for altered mental status.     She recently had a right second toe amputation back last month at [facility].  She was discharged after 2 weeks. According to the patient's family, she was doing okay until few hours prior to admission, patient was noted to be acting different yesterday morning. She was noted to be drowsy    Nephrology asked to see for SHANTANU   I did see pt in ICU she is intubated mechanically ventilated . She is on levophed . She has some LE edema. Vent setting stable .    UO marginal .       Past Medical History:   Diagnosis Date    A-fib [facility])     Acute tablet 2 tablet, TID  cefepime (MAXIPIME) 2000 mg IVPB minibag, Q12H  ondansetron (ZOFRAN) injection 4 mg, Q6H PRN  budesonide (PULMICORT) nebulizer suspension 500 mcg, BID  levalbuterol (XOPENEX) nebulization 0.63 mg, Q4H PRN  insulin glargine-yfgn (SEMGLEE-YFGN) injection vial 5 Units, Nightly  ipratropium-albuterol (DUONEB) nebulizer solution 1 ampule, Q4H WA  [Held by provider] aspirin chewable tablet 81 mg, Daily  polyethylene glycol (GLYCOLAX) packet 17 g, BID  [Held by provider] apixaban (ELIQUIS) tablet 5 mg, BID  atorvastatin (LIPITOR) tablet 20 mg, Daily  sodium chloride flush 0.9 % injection 5-40 mL, 2 times per day  0.9 % sodium chloride infusion, PRN  acetaminophen (TYLENOL) tablet 650 mg, Q6H PRN   Or  acetaminophen (TYLENOL) suppository 650 mg, Q6H PRN  glucose chewable tablet 16 g, PRN  dextrose bolus 10% 125 mL, PRN   Or  dextrose bolus 10% 250 mL, PRN  glucagon (rDNA) injection 1 mg, PRN  dextrose 5 % solution, PRN        Review of Systems:   Unable to obtain due to clinical conditon . Physical exam:   Vital signs /78   Pulse 92   Temp 97.9 °F (36.6 °C) (Oral)   Resp 16   Ht 5' (1.524 m)   Wt 238 lb (108 kg)   SpO2 94%   BMI 46.48 kg/m²   Gen : moderate distress  Head : at , nc   Neck : supple , no thyromegaly noted . Eyes : EOMI , PERRLA   CV : tachycardiac 1+ edema in LE   Lungs: good flow heard b/l   Abd : soft , NT , BS + , No Organomegaly appreciated . Skin : soft, dry . Neuro : CN  II-XII grossly intact , no focal neurologic deficit . Psych : cooperative .      Data:   Labs:  CBC with Differential:    Lab Results   Component Value Date/Time    WBC 8.1 07/14/2022 05:31 AM    RBC 3.10 07/14/2022 05:31 AM    HGB 8.2 07/14/2022 05:31 AM    HCT 27.9 07/14/2022 05:31 AM     07/14/2022 05:31 AM    MCV 90.0 07/14/2022 05:31 AM    MCH 26.5 07/14/2022 05:31 AM    MCHC 29.4 07/14/2022 05:31 AM    RDW 15.5 07/14/2022 05:31 AM    NRBC 0.0 03/15/2019 09:10 AM    SEGSPCT 74 08/20/2013 10:45 AM    METASPCT 0.9 07/14/2022 05:31 AM    LYMPHOPCT 12.2 07/14/2022 05:31 AM    MONOPCT 5.2 07/14/2022 05:31 AM    MYELOPCT 2.6 07/14/2022 05:31 AM    EOSPCT 1 06/16/2017 12:00 AM    BASOPCT 0.9 07/14/2022 05:31 AM    MONOSABS 0.40 07/14/2022 05:31 AM    LYMPHSABS 0.97 07/14/2022 05:31 AM    EOSABS 0.35 07/14/2022 05:31 AM    BASOSABS 0.07 07/14/2022 05:31 AM     CMP:    Lab Results   Component Value Date/Time     07/14/2022 05:31 AM    K 3.7 07/14/2022 05:31 AM    K 5.0 07/06/2022 02:41 AM     07/14/2022 05:31 AM    CO2 29 07/14/2022 05:31 AM    BUN 29 07/14/2022 05:31 AM    CREATININE 1.2 07/14/2022 05:31 AM    GFRAA 53 07/14/2022 05:31 AM    LABGLOM 44 07/14/2022 05:31 AM    GLUCOSE 205 07/14/2022 05:31 AM    PROT 5.7 07/14/2022 05:31 AM    LABALBU 3.5 07/14/2022 05:31 AM    CALCIUM 8.3 07/14/2022 05:31 AM    BILITOT 0.9 07/14/2022 05:31 AM    ALKPHOS 83 07/14/2022 05:31 AM    AST 25 07/14/2022 05:31 AM    ALT <5 07/14/2022 05:31 AM     Ionized Calcium:  No results found for: IONCA  Magnesium:    Lab Results   Component Value Date/Time    MG 2.0 07/14/2022 05:31 AM     Phosphorus:    Lab Results   Component Value Date/Time    PHOS 2.9 07/14/2022 05:31 AM     U/A:    Lab Results   Component Value Date/Time    COLORU Yellow 07/05/2022 05:43 PM    PHUR 5.5 07/05/2022 05:43 PM    WBCUA 1-3 07/05/2022 05:43 PM    RBCUA NONE 07/05/2022 05:43 PM    RBCUA NONE 03/25/2013 09:00 PM    YEAST RARE 10/27/2015 07:18 PM    BACTERIA FEW 07/05/2022 05:43 PM    CLARITYU Clear 07/05/2022 05:43 PM    SPECGRAV 1.025 07/05/2022 05:43 PM    LEUKOCYTESUR Negative 07/05/2022 05:43 PM    UROBILINOGEN 0.2 07/05/2022 05:43 PM    BILIRUBINUR Negative 07/05/2022 05:43 PM    BLOODU Negative 07/05/2022 05:43 PM    GLUCOSEU Negative 07/05/2022 05:43 PM     Microalbumen/Creatinine ratio:  No components found for: RUCREAT  Iron Saturation:  No components found for: PERCENTFE  TIBC:    Lab Results   Component Value Date/Time    TIBC 152 07/09/2022 08:01 AM     FERRITIN:    Lab Results   Component Value Date/Time    FERRITIN 243 07/09/2022 08:01 AM        Imaging:  XR CHEST PORTABLE   Final Result   Mild subsegmental atelectasis in the left lower lobe. XR CHEST PORTABLE   Final Result   Grossly stable pulmonary opacities in the lower left lung, which may   represent atelectasis or pneumonia. XR CHEST PORTABLE   Final Result   1. Patchy right perihilar and left lower lobe airspace disease   2. Status post removal of the endotracheal tube and NG tube      3. There is no pneumothorax. XR CHEST PORTABLE   Final Result   Lines and tubes are stable with no pneumothorax on the right. Some   improvement seen in the region of left lung base in the interval.         XR CHEST PORTABLE   Final Result   The evaluation is limited due to low lung volumes. No interval change compared to prior exam.      Lines and tubes are in unchanged position. XR ABDOMEN FOR NG/OG/NE TUBE PLACEMENT   Final Result   Catheter is in the stomach. XR CHEST PORTABLE   Final Result   Catheter placed with significant right lung aeration improvement and no   pneumothorax         MRI BRAIN WO CONTRAST   Final Result   1. No acute intracranial abnormality. 2. No gross epileptogenic lesion is identified. 3. Bilateral mastoid effusions, which may be due to eustachian tube   dysfunction or otomastoiditis. RECOMMENDATIONS:   Unavailable         CT GUIDED CHEST TUBE   Final Result   Successful uncomplicated  right chest tube placement      Recommendations:   1. Follow-up tube check in 2 weeks   2. Flush tube daily with 5 to 10 mL of sterile saline            XR CHEST PORTABLE   Final Result   1. There is no interval change in the bilateral perihilar and lower lobe   airspace disease more prominent within the right lung. 2. Moderate size right pleural effusion. 3. There is no pneumothorax.          [address] RETROPERITONEAL COMPLETE   Final Result   1. Marked bilateral renal cortical thinning. Echogenic renal parenchyma. No hydronephrosis. 2.  A Diaz catheter appears to be decompressing the bladder. XR CHEST PORTABLE   Final Result   Increasing infiltrate throughout the right lung persistent left basilar   alveolar infiltrate is well. XR CHEST PORTABLE   Final Result   Adequately positioned right internal jugular central venous line. Otherwise,   no significant change compared to prior exam glued in lines and tubes. US DUP LOWER EXTREMITIES BILATERAL VENOUS   Final Result   Within the visualized vessels there is no evidence for deep venous   thrombosis               XR CHEST PORTABLE   Final Result   1. No significant change. Cardiomegaly with right pleural effusion. 2.  Support tubes remain in appropriate position. CT HEAD WO CONTRAST   Final Result   No acute intracranial abnormality or significant change in the overall   appearance of the brain. MRI would be useful if symptoms persist.      RECOMMENDATIONS:   Unavailable         XR ABDOMEN FOR NG/OG/NE TUBE PLACEMENT   Final Result   Catheter is in the proximal stomach. XR CHEST PORTABLE   Final Result   Adequately positioned endotracheal tube. Nasogastric tube coursing below   diaphragm. Otherwise, stable exam.         CT ABDOMEN PELVIS WO CONTRAST Additional Contrast? None   Final Result   Increasing fluid within the abdomen when compared to the prior study. The   anasarca is also increased in appearance of the prior examination. Mild stranding seen around the mesentery which correlation to clinical lab   values is recommended to exclude possible pancreatitis or volume overload. Overall the appearance of the pancreas itself is grossly unremarkable. There   is only mild stranding seen throughout the mesenteric root.          CT CHEST WO CONTRAST   Final Result   Bilateral lung infiltrates with greatest consolidation right lower lobe   probably secondary to atelectasis and or pneumonia. Moderate right pleural effusion. Cardiomegaly and small pericardial effusion. The pericardial effusion is new. Small volume right upper abdominal ascites along with anasarca. This is new. XR CHEST PORTABLE   Final Result   New opacity on the right which may relate to pleural effusion with adjacent   atelectasis and or infiltrate. Cardiomegaly and pulmonary vascular   congestion implying elevated left heart filling pressures. Assessment    Acute kidney injury non oligouric: improved   Baseline cr 0.9  Etiology of SHANTANU due to decrease effective in setting of intravascular volume depletion as evident by her need for levophed and her urine lytes with high avidity for cl and Na .   Basically bland urine sediment which make GN/vasculitis unlikely   High BUN in part due to steroids   LE edema noted though she is still on levophed        Continue hemodynamic support  With her having ok po intake would stop d5w   Guide Abx per pharmacy dosing           -Encephalopathy metabolic multifactorial : better     -Digoxin toxicity s/p digbind    -Shock 2/2 PNA : resolved    -Right Pleural Effusion with catheter in place for drainage              Electronically signed by Desmond Martinez MD on 7/14/2022 at 3:51 PM

## 2022-07-14 NOTE — PROGRESS NOTES
[address] [id number] [address]      Date:2022                                                  Patient Name: Galindo Schumacher  MRN: [id number]  : 1950  Room: [address] [place], [place] [address]    Evaluating OT: EMERY Martin, OTR/L  # [id number]    Referring Provider:  Bella Noyola MD, Jannet Lennon MD  Specific Provider Orders:  Fermin Kasper and Treat\"  22    Diagnosis: Shock ([address] [R57.9]  Uremia [N19]  SHANTANU (acute kidney injury) ([address] [N17.9]  Poisoning by digoxin, accidental or unintentional, initial encounter [T46.0X1A]  Altered mental status, unspecified altered mental status type [R41.82]  Acute pancreatitis, unspecified complication status, unspecified pancreatitis type [K85.90]    Pt was admitted w/ Altered Mental Status, Bradycardia, Hypotension. Recent Med Changes for Psych issues/R Toe Amputation (22). Pertinent Medical History:  Pt has a past medical history of A-fib ([address] Acute on chronic congestive heart failure ([address] Anxiety, Asthma, CAD (coronary artery disease), Cancer ([address] Chronic kidney disease, Depression, Diabetes mellitus ([address] H/O mammogram, Hx MRSA infection, Hyperlipidemia, Hypertension, Lateral epicondylitis, SERENA on CPAP, Parkinson's disease ([address] and Tubal ligation status. ,  has a past surgical history that includes Gallbladder surgery; Toe amputation; Cholecystectomy; Colonoscopy (7/29/15); Endoscopy, colon, diagnostic (7/19/15); Upper gastrointestinal endoscopy; lumbar laminectomy; Tonsillectomy; Breast lumpectomy; Breast reduction surgery; Cardiac catheterization (2014); Cardiac catheterization (2022); and CT GUIDED CHEST TUBE (2022).     Surgeries this admission: None   Intubated 22, Right Pigtail Chest Tube Placed posteriorly Right Lung 22, Extubated 22    Precautions:  Fall Risk    Pure-Wick Catheter  Pigtail Chest Tube - posteriorly R Lung  Minced/Moist Diet/No Straw      Assessment of current deficits   [x] Functional mobility   [x]ADLs  [x] Strength               []Cognition   [x] Functional transfers   [x] IADLs         [x] Safety Awareness   [x]Endurance   [] Fine Coordination              [x] Balance      [] Vision/perception   []Sensation    []Gross Motor Coordination  [] ROM  [] Delirium                   [] Motor Control       OT PLAN OF CARE   OT POC based on physician orders, patient diagnosis and results of clinical assessment    Frequency/Duration 1-3 days/wk for 2 weeks PRN   Specific OT Treatment to include:     * Instruction/training on adapted ADL techniques and AE recommendations to increase functional independence within precautions       * Training on energy conservation strategies, correct breathing pattern and techniques to improve independence/tolerance for self-care routine  * Functional transfer/mobility training/DME recommendations for increased independence, safety, and fall prevention  * Patient/Family education to increase follow through with safety techniques and functional independence  * Recommendation of environmental modifications for increased safety with functional transfers/mobility and ADLs  * Cognitive retraining/development of therapeutic activities to improve problem solving, judgement, memory, and attention for increased safety/participation in ADL/IADL tasks  * Therapeutic exercise to improve motor endurance, ROM, and functional strength for ADLs/functional transfers  * Therapeutic activities to facilitate/challenge dynamic balance, stand tolerance for increased safety and independence with ADLs  * Therapeutic activities to facilitate gross/fine motor skills for increased independence with ADLs  * Neuro-muscular re-education: facilitation of righting/equilibrium reactions  * Positioning to improve skin integrity, interaction with environment and functional independence  * Delirium prevention/treatment  * Manual techniques for edema management  Other:    Recommended Adaptive Equipment: TBD as pt progresses       Home Living:  Pt lives alone in a single-level apartment, Level Entry, No Basement. Bathroom setup:  Walk-in-Shower, Standard-height Commode   Equipment owned:  Rollator, Shower chair    Available Family Assist:  Family members live locally - provide assist PRN    Prior Level of Function:  Pt reported being IND with all ADLs, Light IADLs, Transfers and Mobility using Rollator for ambulation. Driving:  No  Occupation:  None reported    Pain Level:  Denied pain    Additional Complaints:  Discomfort w/ Chest Tube, Moderate Dizziness - O2 sats > 95%, /63 seated    Cognition: A & O x 4 - able to ID current Day/date INDly   Able to Follow Multi-Step Commands w/ occasional Min VCs for safety   Memory:  good    Sequencing:  good (-)   Problem solving:  good (-)   Judgement/safety:  good (-)  Additional Comments:  Pt was pleasant and cooperative.       Vitals/Lab Values:  O2 sats remained WFL w/ 2L O2       Functional Assessment:  AM-PAC Daily Activity Raw Score: 13/24     Initial Eval Status  Date: 7/13/22   Treatment Status  Date:  7-14-22 STGs = LTGs  Time frame: 10-14 days   Feeding SUP/Set up     SUP/Set up NA   Grooming Min A/Set up    Min A for hair care  Initially required Min A for safety w/ dynamic sitting balance - improved to Close SUP EOB   Unable to tolerate ambulating to or standing at sink   Mod A/Set up    For hair care seated EOB, discomfort w/ reaching overhead for task r/t chest tube SUP/Set up  Seated/Standing at the Sink   UB Dressing Mod A/Set up    Simulated - seated EOB  Unable to tolerate item retrieval for activities   Mod A/Set up    Donning/doffing gown seated EOB  VCs for techs to improve safety/safety awareness w/ ax/ sitting balance/chest tube   Set up     LB Dressing Dep    Max A to don socks  Mod A of 2 for safety in standing - pants simulated  Pt ed for safe/adaptive techs, use of adaptive equip   Max A    Max A to don socks seated EOB  Max A to adjust pants over hips (Simulated) + Mod A for safety w/ standing balance during task, pt ed re: Benefits of use of adaptive equipment   Min A     Bathing NT     NT Min A      Toileting Dep    Pure-Wick  Fecal Mgmt System   NT    Pure-wick Min A     Bed Mobility  Supine to sit: Max A of 2   Sit to supine:  Max A of 2     VCs for safety   Supine to sit: Mod a   Sit to supine:   Mod a     VCs for safety/awareness of chest tube   Supine to sit: SUP  Sit to supine: SUP     Functional Transfers Mod A of 2 for safety    Standing from EOB, mgmt of multiple lines  Pt ed for safety/hand placement   Mod A for safety    Standing from EOB 2x chair 1x, mgmt of multiple lines  Pt ed for safety/hand placement SUP     Functional Mobility Mod A of 2 for safety w/ [employer]    Side-stepping very short distance along EOB  Pt ed for safety/improved safety awareness, walker safety   Mod A of 2 for safety w/ [employer]    Side-stepping very short distance along EOB and b/t surfaces  Pt ed for safety/improved safety awareness, walker safety  Mod encouragement to increase ax level/sit up in chair   SUP     Balance Sitting:     Static:  Min A -> SUP    Dynamic:  Min A -> Close SUP w/ functional ax EOB     Standing:     Static:  Mod A of 2 w/ [employer]    Dynamic:  Mod A of 2 w/ functional ax/mobility w/ [employer]   Sitting:     Static:  Min A -> SUP    Dynamic:  Min A -> Close SUP w/ functional ax EOB     Standing:     Static:  Mod A of 2 w/ [employer]    Dynamic:  Mod A of 2 w/ functional ax/mobility w/ [employer] Sitting:     Static:  IND    Dynamic:  IND w/ functional ax    Standing:     Static:  SUP w/ AD PRN    Dynamic:  SUP w/ functional ax/mobility w/ AD PRN   Activity Tolerance Fair(-)    Able to tolerate sitting EOB > 20 mins  Able to tolerate standing ~ 3-4 mins   Fair    Able to tolerate sitting EOB > 30 mins w/ light ax  Able to tolerate standing ~ 3-4 mins 2x w/ extended seated rest break b/t trials d/t moderate dizziness     Good(-)   Visual/  Perceptual    Hearing: WNL   Glasses: No    WFL   Hearing Aids:  No               Hand Dominance: Right   AROM Strength Additional Info:    ANN  WFL Grossly 4/5 Good ;   Good(-) FMC/dexterity noted during ADL tasks     ELOISE [facility] [place] 4/5 Good ;    Good(-) FMC/dexterity noted during ADL tasks       Sensation:  Denies numbness or tingling Niraj UEs   Tone: WFL Niraj UEs   Edema: None Noted Niraj UEs     Comments: Upon arrival, patient was found in semi-supine. She was agreeable to participate in therapeutic ax. No Family present during session. Received permission from RN prior to engaging pt in OT services. Educated pt on role of OT services. At the end of the session, patient was properly positioned in b/s chair. Call light and phone within reach, all lines and tubes intact. Oriented pt to call bell. Made all appropriate Environmental Modifications to facilitate pt's level of IND and safety. All needs met. RN made aware of pt's position in chair. Overall patient demonstrated decreased independence and safety during completion of ADL/functional transfer/mobility tasks. Pt would benefit from continued skilled OT to increase safety and independence with completion of ADL/IADL tasks for functional independence and quality of life.     Treatment: OT treatment provided this date includes:    Instruction/training on safety and adapted techniques for completion of ADLs, use of DME/AD/Adaptive equip:     Instruction/training on safe functional mobility/transfer techniques, use of DME/AD:     Instruction/training on energy conservation techs (EC)/Pursed-Lip Breathing (PLB)/work simplification for completion of ADLs:      Neuromuscular Reeducation to facilitate balance/righting reactions for increased function with ADLs:     Skilled positioning/alignment for Pain Mgmt, Skin Integrity, Edema Control, to maximize Pt's safety and ability to [facility] [place] interact w/ his/her environment, maximize respiratory status   Activity tolerance - Sitting/Standing to improve endurance w/ functional ax    Cognitive retraining -  Cues for safety/safety awareness, sequencing, problem solving     Skilled monitoring of Vitals during session and pt's response to tx ax       Consulted RN, SW/CM, PT - Session completed in collaboration w/ PT for pt's safety     Made all appropriate Environmental Modifications to facilitate pt's level of IND and safety.  Recommendations for Continued Participation in OT services during Hospitalization and at D/C - SNF    Pt and/or Family verbalized/demonstrated a Good understanding of education provided. Will Review PRN.       Time In: 1131  Time Out: 1234  Total Treatment Time: 54 minutes    Min Units   OT Eval Low 63514       OT Eval Medium 14589      OT Eval High [id number]      OT Re-Eval [id number]       Therapeutic Ex [id number]       Therapeutic Activities 68493  15  1   ADL/Self Care 54312  39  3   Orthotic Management 57036       Manual [id number]     Neuro Re-Ed [id number]       Non-Billable Time            Susie Ramirez, EMERY, OTR/L  # [id number]

## 2022-07-14 NOTE — PLAN OF CARE
Problem: Chronic Conditions and Co-morbidities  Goal: Patient's chronic conditions and co-morbidity symptoms are monitored and maintained or improved  Outcome: Progressing     Problem: Pain  Goal: Verbalizes/displays adequate comfort level or baseline comfort level  Outcome: Progressing     Problem: Skin/Tissue Integrity  Goal: Absence of new skin breakdown  Description: 1. Monitor for areas of redness and/or skin breakdown  2. Assess vascular access sites hourly  3. Every 4-6 hours minimum:  Change oxygen saturation probe site  4. Every 4-6 hours:  If on nasal continuous positive airway pressure, respiratory therapy assess nares and determine need for appliance change or resting period.   Outcome: Progressing     Problem: Nutrition Deficit:  Goal: Optimize nutritional status  Outcome: Progressing     Problem: Cardiovascular - Adult  Goal: Maintains optimal cardiac output and hemodynamic stability  Outcome: Progressing     Problem: Metabolic/Fluid and Electrolytes - Adult  Goal: Hemodynamic stability and optimal renal function maintained  Outcome: Progressing

## 2022-07-14 NOTE — PROGRESS NOTES
[place]  Department of Internal Medicine  Division of Pulmonary, Critical Care and Sleep Medicine  Progress Note    Roxy Nathan DO, MPH, Asha Ortiz, [address] [facility] Jennifer CRABTREE MD      Patient: Chalo Brown  MRN: [id number]  : 1950    Encounter Time: 2:49 PM     Date of Admission: 2022 12:25 PM    Primary Care Physician: No primary care provider on file. Reason for Consultation: Drain CT management     SUBJECTIVE:    Overnight event was significant for a phosphorus level 1.8 - replaced. Oriented to time, person and place, and does not have any complaint for today except for a throat pain. OBJECTIVE:     PHYSICAL EXAM:   VITALS:   Vitals:    22 1940 22 0615 22 0801 22 0830   BP: (!) 105/38   109/78   Pulse: 68  (!) 101 92   Resp: 16  15 16   Temp: 98.5 °F (36.9 °C)   97.9 °F (36.6 °C)   TempSrc: Oral   Oral   SpO2: 98%  95% 94%   Weight:  238 lb (108 kg)     Height:            Intake/Output Summary (Last 24 hours) at 2022 1449  Last data filed at 2022 0932  Gross per 24 hour   Intake 10 ml   Output 795 ml   Net -785 ml        CONSTITUTIONAL:    Alert  SKIN:     No rash,   HEENT:     EOMI, MMM, No thrush  NECK:    No bruits, No JVP appreciated  CV:      Sinus,  No murmur, No rubs, No gallops  PULMONARY:   Couse BS,  No Wheezing, No Rales, No Rhonchi      CT noted  ABDOMEN:     Soft, non-tender. BS normal. No R/R/G  EXT:    No deformities . No clubbing. Edema ower extremity edema, No venous stasis  PULSE:   Appears equal and palpable.   PSYCHIATRIC:  Seems appropriate, No acute psychosis  MS:    Gross weakness  NEUROLOGIC:   The clinical assessment is non-focal     DATA: IMAGING & TESTING:     LABORATORY TESTS:    CBC:   Lab Results   Component Value Date/Time    WBC 8.1 2022 05:31 AM    RBC 3.10 2022 05:31 AM    HGB 8.2 2022 05:31 AM    HCT 27.9 2022 05:31 AM MCV 90.0 07/14/2022 05:31 AM    MCH 26.5 07/14/2022 05:31 AM    MCHC 29.4 07/14/2022 05:31 AM    RDW 15.5 07/14/2022 05:31 AM     07/14/2022 05:31 AM    MPV 12.3 07/14/2022 05:31 AM     CMP:    Lab Results   Component Value Date/Time     07/14/2022 05:31 AM    K 3.7 07/14/2022 05:31 AM    K 5.0 07/06/2022 02:41 AM     07/14/2022 05:31 AM    CO2 29 07/14/2022 05:31 AM    BUN 29 07/14/2022 05:31 AM    CREATININE 1.2 07/14/2022 05:31 AM    GFRAA 53 07/14/2022 05:31 AM    LABGLOM 44 07/14/2022 05:31 AM    GLUCOSE 205 07/14/2022 05:31 AM    PROT 5.7 07/14/2022 05:31 AM    LABALBU 3.5 07/14/2022 05:31 AM    CALCIUM 8.3 07/14/2022 05:31 AM    BILITOT 0.9 07/14/2022 05:31 AM    ALKPHOS 83 07/14/2022 05:31 AM    AST 25 07/14/2022 05:31 AM    ALT <5 07/14/2022 05:31 AM     Calcium:    Lab Results   Component Value Date/Time    CALCIUM 8.3 07/14/2022 05:31 AM     Ionized Calcium:  No results found for: IONCA  Magnesium:    Lab Results   Component Value Date/Time    MG 2.0 07/14/2022 05:31 AM     Phosphorus:    Lab Results   Component Value Date/Time    PHOS 2.9 07/14/2022 05:31 AM     PT/INR:    Lab Results   Component Value Date/Time    PROTIME 15.0 07/14/2022 05:31 AM    INR 1.3 07/14/2022 05:31 AM        PRO-BNP:   Lab Results   Component Value Date    PROBNP 37,248 (H) 07/12/2022    PROBNP 41,368 (H) 07/06/2022      ABGs:   Lab Results   Component Value Date/Time    PH 7.452 07/11/2022 05:07 AM    PO2 133.6 07/11/2022 05:07 AM    PCO2 40.0 07/11/2022 05:07 AM     Hemoglobin A1C: No components found for: HGBA1C    IMAGING:  Imaging tests were completed and reviewed and discussed radiology and care team involved and reveals   Echo Complete    Result Date: 7/7/2022  Findings   Left Ventricle  Micro-bubble contrast injected to enhance left ventricular visualization. Normal left ventricular size and systolic function. Ejection fraction is visually estimated at 70-75%. Indeterminate diastolic function. No regional wall motion abnormalities seen. Mild left ventricular concentric hypertrophy noted. Right Ventricle  Moderately dilated right ventricle. Right ventricle global systolic function is reduced. TAPSE 1.1 cm. Left Atrium  The left atrium is moderate-severely dilated. JUANY 48 ml/m2. The interatrial septum appears intact. Right Atrium  Mildly enlarged right atrium. Mitral Valve  Structurally normal mitral valve. No evidence of mitral valve stenosis. Physiologic mitral regurgitation. Tricuspid Valve  The tricuspid valve appears structurally normal.  Mild tricuspid regurgitation. RVSP is at least 60 mmHg. Aortic Valve  Individual aortic valve leaflets are not clearly visualized. No hemodynamically significant aortic stenosis is present. No evidence of aortic valve regurgitation. Pulmonic Valve  The pulmonic valve was not well visualized. No evidence of pulmonic valve stenosis. No evidence of any pulmonic regurgitation. Pericardial Effusion  Prominent pericardial fat pad. No definitive evidence of pericardial effusion. Aorta  Aortic root dimension within normal limits. Miscellaneous  Dilated Inferior Vena Cava. Conclusions   Summary  Technically difficult study - limited visualization. Micro-bubble contrast injected to enhance left ventricular visualization. Normal left ventricular size and systolic function. Ejection fraction is visually estimated at 70-75%. Indeterminate diastolic function. No regional wall motion abnormalities seen. Mild left ventricular concentric hypertrophy noted. Moderately dilated right ventricle with reduced function. Biatrial dilation. Mild tricuspid regurgitation. RVSP is at least 60 mmHg. Prominent pericardial fat pad. No definitive evidence of pericardial effusion. Result Date: 7/5/2022  FINDINGS: Lower Chest: See the CT report of the chest dated the same date for full details. Organs: The liver is grossly unremarkable.   The spleen is unremarkable. Gallbladder is been surgically removed. The pancreas reveals some mild fatty replacement. Both adrenal glands are within normal limits. The kidneys are unremarkable in appearance. GI/Bowel: Stomach is within normal limits. No mucosal abnormality. Stool seen scattered diffusely throughout the colon. There is no wall thickening. No mucosal abnormality or distension of the small bowel. Pelvis: Pelvic organs reveal mild wall thickening of the bladder with incomplete distension. The uterus is grossly unremarkable. Peritoneum/Retroperitoneum: There is no abdominal retroperitoneal lymphadenopathy. There is mild stranding identified in the root of the mesentery as well as surrounding the pancreas in which mild inflammation of the pancreas cannot be completely excluded. Correlation to clinical lab values is recommended. No pelvic adenopathy with moderate atherosclerotic disease seen within the abdominal aorta and iliac vessels. Free fluid identified throughout the upper abdomen. Extensive vascular calcifications seen within the mesenteric vessels as well as the abdominal aorta. Bones/Soft Tissues: The bony structures reveal extensive degenerative changes seen within the spine and pelvis. Severe degenerative changes seen within the sacroiliac joints bilaterally right greater than left some sclerosis on the right to suggest prior healed sacroiliitis. There is extensive anasarca seen within the subcutaneous tissues. Increasing fluid within the abdomen when compared to the prior study. The anasarca is also increased in appearance of the prior examination. Mild stranding seen around the mesentery which correlation to clinical lab values is recommended to exclude possible pancreatitis or volume overload. Overall the appearance of the pancreas itself is grossly unremarkable. There is only mild stranding seen throughout the mesenteric root.      CT HEAD WO CONTRAST  Result Date: 7/5/2022  No acute intracranial abnormality or significant change in the overall appearance of the brain. MRI would be useful if symptoms persist. RECOMMENDATIONS: Unavailable     CT CHEST WO CONTRAST    Result Date: 7/5/2022  FINDINGS: Mediastinum: The cardiac size is enlarged. There is a small pericardial effusion. Minimal probable gas in the right atrium is likely secondary to recent intravenous injection. The esophagus is normal.  No definite mediastinal masses or lymphadenopathy. Lungs/pleura: There is consolidation posterior right upper lobe and right middle lobe with linear density in the lingula and at the left posterior lung base. There is right lower lobe consolidation and moderate right pleural effusion. Upper Abdomen: There is a small volume of right upper quadrant ascites. There are surgical clips in the gallbladder fossa and right upper anterior abdominal wall. Soft Tissues/Bones: There is mild induration of the subcutaneous fat. There is moderate lower thoracic spondylosis with slight levoconvex curvature of the thoracic spine. No definite lytic or blastic osseous lesions. Bilateral lung infiltrates with greatest consolidation right lower lobe probably secondary to atelectasis and or pneumonia. Moderate right pleural effusion. Cardiomegaly and small pericardial effusion. The pericardial effusion is new. Small volume right upper abdominal ascites along with anasarca. This is new. CT GUIDED CHEST TUBE  Result Date: 7/8/2022  [facility] After obtaining informed consent, following the routine sterile prep and drape and after the administration of local anesthesia a needle was inserted into the right pleural space . Serous fluid was aspirated. Subsequently a guidewire was passed through the needle. Subsequently the needle was removed and over the guidewire the tract was dilated. Following dilatation a locking loop catheter was placed. Post procedure CT scan reveals the tube to be in good position.  Specimen was sent to the laboratory. The patient tolerated the procedure well. There were no complications. Prior to the procedure a timeout occurred at  1542 hours. The patient received 9 second  of  fluoroscopy. The patient received local anesthesia. The patient was monitored for 60 minutes by a registered nurse. Successful uncomplicated  right chest tube placement Recommendations: 1. Follow-up tube check in 2 weeks 2. Flush tube daily with 5 to 10 mL of sterile saline     MRI BRAIN WO CONTRAST  Result Date: 7/8/2022  FINDINGS: INTRACRANIAL STRUCTURES/VENTRICLES: There is no acute infarct. No mass effect, edema or hemorrhage is seen. Mild volume loss is seen in the cerebrum with mild chronic microvascular ischemic changes. No hydrocephalus or extra-axial fluid is seen. ORBITS: Prosthetic lenses are seen in the globes bilaterally. The orbits are otherwise grossly unremarkable. SINUSES: Mild mucosal thickening in the paranasal sinuses. There is pooling of secretions in the nasopharynx. Moderate bilateral mastoid effusions. BONES/SOFT TISSUES: The bone marrow signal intensity appears normal. The soft tissues demonstrate no acute abnormality. 1.  No acute intracranial abnormality. 2. No gross epileptogenic lesion is identified. 3. Bilateral mastoid effusions, which may be due to eustachian tube dysfunction or otomastoiditis. RECOMMENDATIONS: Unavailable     US DUP LOWER EXTREMITIES BILATERAL VENOUS  Within the visualized vessels there is no evidence for deep venous thrombosis       Assessment: 1. Acute hypoxic hypercapnic respiratory failure 2/2 mod R pleural effusion, possible HAP vs acute decompensated  HFpEF exacerbation (EF 70-75%)  2. Pulmonary hypertension likely Type II 2/2 acute decompensated diastolic heart failure, in the setting of Hx SERENA  3. Moderate R pleural effusion, likely 2/2 HAP (Klebsiella pneumoniae) vs acute decompensated heart failure. D4 CT, 250 cc output in 24 hrs  4.  HAP (Klebsiella pneumoniae on resp culture, light growth). D1 cefepime  5. Elevated pro-BNP, multifactorial, likely acute decompensated HFpEF, septic shock. 6. Elevated troponin likely 2/2 demand ischemia  7. Permanent atrial fibrillation, s/p DCCV (April and May 2022), now rate controlled. 8. SHANTANU Stage III on CKD multifactorial, pre-renal (hemodynamically mediated, DHN 2/2 diuretic use, poor oral intake); vs ATN 2/2 septic shock. 9. Hypernatremia likely 2/2 over diuresis  10. Metabolic alkalosis likely contraction alkalosis 2/2 diuresis, poor oral intake  11. Pancytopenia likely 2/2 BM suppression 2/2 chronic illness, meds (Zosyn)  12. Acute on chronic macrocytic anemia, multifactorial, likely 2/2 chronic disease, blood draws,  Less likely hemolysis (no schistocytes, haptoglobin wnl) s/p 1 unit PRBC 7/11, FOBT positive today, with pinkish tinge on chest tube output  13. Thrombocytopenia,likely 2/2 #11, less likely HIT (not exposed to heparin products), DIC, hemolysis ruled out (work-up negative)  14. Prolonged INR, ? HFP wnl, no overt bleeding, apixaban and aspirin on hold. Given Vit K yesterday for INR 3.2>2 today  15. Low grade fever, multifactorial, ? meds (Precedex?), CLABSI?, no new leukocytosis, or hemodynamic instability   16. Hx of asthma  17. Hx of SERENA. Not using CPAP at home  18. Hx of HTN. -160`s  19. Hx of CAD  20. Hx of Type 2 DM with neuropathy. On Lantus 10 and Novolog SS at home  21. Hx of depression/anxiety. 22. Hx of restless leg syndrome  23. Hx of Parkinson's disease. On Sinemet and ropinirole at home, which was discontinued upon discharge at [facility]:   1. CXR needed  2.  If ok will remove catheter      Sabrina Patel DO DO, MPH, Leslie Hubbard  Professor of Internal Medicine  Pulmonary, Critical Care and Sleep Medicine

## 2022-07-14 NOTE — CARE COORDINATION
Spoke with patient, she tells me she understands she is weak and needs SHERITA when she is released. She would prefer [facility], referral pending. Also left message with son, Arron Mazariegos to confirm choice. Pasrr and ambulance on soft chart. Spoke with daughter, Sunita Garcia, she is unsure what brother would prefer, she is going to send him a text to follow up with me. Also received call back from [facility], they would be willing to accept there if bed available. For questions I can be reached at [phone]. [place], [place]    The Plan for Transition of Care is related to the following treatment goals: discharge planning when stable     The Patient and/or patient representative Roayl Ayala was provided with a choice of provider and agrees   with the discharge plan. [x] Yes [] No    Freedom of choice list was provided with basic dialogue that supports the patient's individualized plan of care/goals, treatment preferences and shares the quality data associated with the providers.  [x] Yes [] No

## 2022-07-14 NOTE — PROGRESS NOTES
[facility]  Internal Medicine Residency / [address]    Attending Physician Statement  I have discussed the case, including pertinent history and exam findings with the resident and the team.  I have seen and examined the patient and the key elements of the encounter have been performed by me. I agree with the assessment, plan and orders as documented by the resident. Case Discussed During AM Rounds   No overnight events    Slight sore throat- likely related to intubation   CXR with mild atelectasis- no additional findings   Rhonchi persistent on exam- but respiratory status remains stable    Tolerating medication regimen    Chest tube remains in place- await discussion for possible removal today    FMS was removed   PT/OT following    States continued improvement in symptoms     Acute Encephalopathy- resolved    Continue seroquel   Hold home depakote for now    Right Pleural Effusion with catheter in place for drainage   CXR reviewed   Pulmonary following    Chest tube remains in place- ?  Removal- coordinate with Pulmonary     Moderate Oropharyngeal dysphagia   Speech therapy assessment appreciated    Modified meal plan noted- dysphagia protocol: Minced and moist consistency solids (IDDSI level 5) with  thin liquids NO STRAW   Currently tolerating diet     Septic Shock likely secondary to Pneumonia- resolved    Hemodynamically stable   Continue atbs    Cultures reviewed- on cefepime due to adjustment of cultures reviewed   Complete 7 days of atbs for cultures noted     Pancytopenia complicated by severe Thrombocytopenia- resolving    Still noting normocytic anemia    WBCs and Platelets stable   Resume ASA and eliquis as able    Discussed with patient continuing to hold for now and will resume as able     Hypernatremia- resolved     HTN- B-blocker has been increased   Continue to monitor BP readings     Acute Hypoxic Respiratory Failure s/p extubation- stable    Continue to monitor respiratory function    Respiratory status currently stable    -8.6 L since admission   Maintain off any further diuresis at this time    Continue to monitor volume status     Atrial Fibrillation    Holding anticoagulation as noted above    No signs currently of bleeding     Hx of Parkinson's Disease on presentation    Sinemet has been resumed and follow for any concerns    Lactic Acidosis- resolved       Disposition- DC planning ongoing    Remainder of medical problems as per resident note.       Amelia Maier MD, [address]   Internal Medicine Residency Faculty

## 2022-07-14 NOTE — PROGRESS NOTES
Physical Therapy  Physical Therapy Treatment    Name: Lily Jarvis  : 1950  MRN: [id number]      Date of Service: 2022    Evaluating PT:  Vicki William PT, DPT [id number]    Room #:  3878/7109-F  Diagnosis:  Shock ([facility] [address] [R57.9]  Uremia [N19]  SHANTANU (acute kidney injury) ([facility] [address] [N17.9]  Poisoning by digoxin, accidental or unintentional, initial encounter [T46.0X1A]  Altered mental status, unspecified altered mental status type [R41.82]  Acute pancreatitis, unspecified complication status, unspecified pancreatitis type [K85.90]  PMHx/PSHx:  CHF, a fib, anxiety, asthma, CAD, CKD, DM, HLD, HTN, SERENA, parkinson's disease, tubal ligation status  Procedure/Surgery:  Extubated 7/10  Precautions:  Falls, , O2, R chest tube  Equipment Needs:  TBD    SUBJECTIVE:    Pt lives alone in an apartment with level entry and elevator access. Bed is on 1st floor and bath is on 1st floor. Pt ambulated with rollator PTA. Pt reports having conversation with son about transitioning to assisted living, but has not yet been able to. OBJECTIVE:   Initial Evaluation  Date: 22 Treatment  Date:22 Short Term/ Long Term   Goals   AM-PAC 6 Clicks  40/08    Was pt agreeable to Eval/treatment? yes     Does pt have pain? L hip pain during mobility 5/10 stomach pain    Bed Mobility  Rolling: MaxA x2  Supine to sit: MaxA x2  Sit to supine: maxA x2  Scooting: MaxA x2 Rolling: NT  Supine to sit: Mod A  Sit to supine: NT  Scooting: Mod A Rolling: Shell  Supine to sit: Shell  Sit to supine: Shell  Scooting: Shell   Transfers Sit to stand: Shell x2  Stand to sit: Shell x2  Stand pivot: NT Sit to stand: Mod A  Stand to sit: Mod A  Stand pivot:  Mod A with [employer] Sit to stand: SBA  Stand to sit: SBA  Stand pivot: SBA with [employer]   Ambulation    NT, unable 4 sidesteps EOB with [employer] with Mod A 50 feet with [employer] SBA   Stair negotiation: ascended and descended  NT NT TBD   ROM BUE:  Defer to OT note  BLE:  WFL     Strength BUE:  Defer to OT

## 2022-07-14 NOTE — PROGRESS NOTES
[address] [place] [address],[address]  Internal Medicine Residency Program  Progress Note - House Team     Patient:  Bradley Nunez 66 y.o. female MRN: [id number]     Date of Service: 7/14/2022     CC: Altered mental status secondary to acute hypoxic hypercapnic respiratory failure likely 2/2 to R pleural effusion, possible HAP vs acute decompensated HF. Days since admission: 9     Subjective     Overnight events: Overnight event was significant for a phosphorus level 1.8 and was subsequently replaced. Patient was seen this morning and examined and as far as her mentation is concerned she is oriented to time, person and place, and does not have any complaint for today except for a throat pain. No shortness of breath, chest pain, no abdominal pain, headache, fever or chills. Objective     Physical Exam:  Vitals: /78   Pulse 92   Temp 97.9 °F (36.6 °C) (Oral)   Resp 16   Ht 5' (1.524 m)   Wt 238 lb (108 kg)   SpO2 94%   BMI 46.48 kg/m²     I & O - 24hr:   Intake/Output Summary (Last 24 hours) at 7/14/2022 1256  Last data filed at 7/14/2022 0932  Gross per 24 hour   Intake 10 ml   Output 795 ml   Net -785 ml      General Appearance: alert, appears stated age and cooperative  HEENT:  Head: Normocephalic, no lesions, without obvious abnormality. Neck: no adenopathy, no carotid bruit, no JVD, supple, symmetrical, trachea midline and thyroid not enlarged, symmetric, no tenderness/mass/nodules  Lung: Bilateral rhonchi, no wheezing or crackles   Heart: regular rate and rhythm, S1, S2 normal, no murmur, click, rub or gallop  Abdomen: soft, non-tender; bowel sounds normal; no masses,  no organomegaly  Extremities:  Bilateral lower extremity edema, no cyanosis   Musculokeletal: No joint swelling, no muscle tenderness. ROM normal in all joints of extremities.    Neurologic: Mental status: Alert, oriented, thought content appropriate  Subject  Pertinent Information & Imaging Studies, Consults   genesis  CBC with Differential:    Lab Results   Component Value Date/Time    WBC 8.1 07/14/2022 05:31 AM    RBC 3.10 07/14/2022 05:31 AM    HGB 8.2 07/14/2022 05:31 AM    HCT 27.9 07/14/2022 05:31 AM     07/14/2022 05:31 AM    MCV 90.0 07/14/2022 05:31 AM    MCH 26.5 07/14/2022 05:31 AM    MCHC 29.4 07/14/2022 05:31 AM    RDW 15.5 07/14/2022 05:31 AM    NRBC 0.0 03/15/2019 09:10 AM    SEGSPCT 74 08/20/2013 10:45 AM    METASPCT 0.9 07/14/2022 05:31 AM    LYMPHOPCT 12.2 07/14/2022 05:31 AM    MONOPCT 5.2 07/14/2022 05:31 AM    MYELOPCT 2.6 07/14/2022 05:31 AM    EOSPCT 1 06/16/2017 12:00 AM    BASOPCT 0.9 07/14/2022 05:31 AM    MONOSABS 0.40 07/14/2022 05:31 AM    LYMPHSABS 0.97 07/14/2022 05:31 AM    EOSABS 0.35 07/14/2022 05:31 AM    BASOSABS 0.07 07/14/2022 05:31 AM     BUN/Creatinine:    Lab Results   Component Value Date/Time    BUN 29 07/14/2022 05:31 AM    CREATININE 1.2 07/14/2022 05:31 AM     Ionized Calcium:  No results found for: IONCA  Magnesium:    Lab Results   Component Value Date/Time    MG 2.0 07/14/2022 05:31 AM     U/A:    Lab Results   Component Value Date/Time    COLORU Yellow 07/05/2022 05:43 PM    PROTEINU 30 07/05/2022 05:43 PM    PHUR 5.5 07/05/2022 05:43 PM    WBCUA 1-3 07/05/2022 05:43 PM    RBCUA NONE 07/05/2022 05:43 PM    RBCUA NONE 03/25/2013 09:00 PM    YEAST RARE 10/27/2015 07:18 PM    BACTERIA FEW 07/05/2022 05:43 PM    CLARITYU Clear 07/05/2022 05:43 PM    SPECGRAV 1.025 07/05/2022 05:43 PM    LEUKOCYTESUR Negative 07/05/2022 05:43 PM    UROBILINOGEN 0.2 07/05/2022 05:43 PM    BILIRUBINUR Negative 07/05/2022 05:43 PM    BLOODU Negative 07/05/2022 05:43 PM    GLUCOSEU Negative 07/05/2022 05:43 PM     TSH:    Lab Results   Component Value Date/Time    TSH 4.580 07/06/2022 02:41 AM     VITAMIN B12: No components found for: B12  FOLATE:    Lab Results   Component Value Date/Time    FOLATE 5.8 07/06/2022 03:08 AM       IMAGING:   Imaging Studies:    XR CHEST PORTABLE    Result Date: 7/14/2022  Mild subsegmental atelectasis in the left lower lobe. XR CHEST PORTABLE    Result Date: 7/12/2022  Grossly stable pulmonary opacities in the lower left lung, which may represent atelectasis or pneumonia. XR CHEST PORTABLE    Result Date: 7/11/2022  1. Patchy right perihilar and left lower lobe airspace disease 2. Status post removal of the endotracheal tube and NG tube 3. There is no pneumothorax. XR CHEST PORTABLE    Result Date: 7/10/2022  Lines and tubes are stable with no pneumothorax on the right. Some improvement seen in the region of left lung base in the interval.     XR CHEST PORTABLE    Result Date: 7/9/2022  The evaluation is limited due to low lung volumes. No interval change compared to prior exam. Lines and tubes are in unchanged position. XR CHEST PORTABLE    Result Date: 7/8/2022  Catheter placed with significant right lung aeration improvement and no pneumothorax     XR CHEST PORTABLE    Result Date: 7/8/2022  1. There is no interval change in the bilateral perihilar and lower lobe airspace disease more prominent within the right lung. 2. Moderate size right pleural effusion. 3. There is no pneumothorax. CT GUIDED CHEST TUBE    Result Date: 7/8/2022  Successful uncomplicated  right chest tube placement Recommendations: 1. Follow-up tube check in 2 weeks 2. Flush tube daily with 5 to 10 mL of sterile saline     XR ABDOMEN FOR NG/OG/NE TUBE PLACEMENT    Result Date: 7/8/2022  Catheter is in the stomach. US RETROPERITONEAL COMPLETE    Result Date: 7/7/2022  1. Marked bilateral renal cortical thinning. Echogenic renal parenchyma. No hydronephrosis. 2.  A Diaz catheter appears to be decompressing the bladder. MRI BRAIN WO CONTRAST    Result Date: 7/8/2022  1. No acute intracranial abnormality. 2. No gross epileptogenic lesion is identified. 3. Bilateral mastoid effusions, which may be due to eustachian tube dysfunction or otomastoiditis.  RECOMMENDATIONS: Unavailable PROCEDURES: Remove chest tube today       Notable Cultures:      Blood cultures   Blood Culture, Routine   Date Value Ref Range Status   07/05/2022 5 Days no growth  Final     Respiratory cultures No results found for: RESPCULTURE   Gram Stain Result   Date Value Ref Range Status   07/08/2022 Refer to ordered Gram stain for results  Final     Urine   Creatinine Ur POCT   Date Value Ref Range Status   06/17/2019 50 mg/dl  Final     Urine Culture, Routine   Date Value Ref Range Status   07/05/2022 Growth not present  Final     Legionella No results found for: LABLEGI  C Diff PCR No results found for: CDIFPCR  Wound culture/abscess: No results for input(s): WNDABS in the last 72 hours. Tip culture:  Recent Labs     07/12/22  1702   CXCATHTIP Growth not present, incubation continues  24 Hours growth not present; incubation continues          Antibiotic  Days  Day started   Zosyn  7/5 dcd 7/11      Cefepime    3   7/11/2022                  OXYGENATION: On NC 2L     DIET: Dysphagia - Minced and moist         Resident's Assessment and Plan     Praneeth Darden is a 66 y.o. female with  has a past medical history of A-fib ([address] Acute on chronic congestive heart failure ([address] Anxiety, Asthma, CAD (coronary artery disease), Cancer ([address] Chronic kidney disease, Depression, Diabetes mellitus ([address] H/O mammogram, Hx MRSA infection, Hyperlipidemia, Hypertension, Lateral epicondylitis, SERENA on CPAP, Parkinson's disease ([address] and Tubal ligation status.   came here with CC   Chief Complaint   Patient presents with    Altered Mental Status     per ems family reports ams x 45 minutes, recent psych med changes and right toe amputation        SUMMARY [facility]: Briefly, Ms. Lord Nick is a 67 y/o F admitted for acute hypoxic hypercapnic respiratory failure and altered mental status likely secondary to pleural effusion, possible HAP and acute decompensated HFpEF exacerbation (EF 70-75%%), and septic shock likely secondary to pneumonia, hypernatremia, and lactic acidosis. She was intubated and placed in the ICU, however, was extubated after improvement on 7/10/2022. Today she reports that she is doing better, no complaint currently except throat pain over the last few hours. Currently patient is being treated with cefepime 2000 mg IV q12h for possible HAP. Days since admission: 9    Consults:   Pulmonology  Neurology  Cardiology see PRN     Assessment:  1. Acute encephalopathy-Resolved        Altered mental status on admission        Intubated in the ICU due to AMS        Extubated 4 days ago        Currently oriented to person time and place          Plan       Continue seroquel        Hold home depakote due to its association with encephalopathy    2. Acute hypoxic hypercapnic respiratory failure likely 2/2 to mod R pleural     effusion, possible HAP vs acute decompensated HFpEF  exacerbation      (EF 70-75%)- Resolving         Patient was seen today and is improving          Pt able is orientated to time and place with good concentration         Chest tube still in place for now          Most recent CXR reveals mild subsegmental atelectasis in the LLL         Currently on NC 2L           Plan       Coordinate with pulmonology to remove chest tube          Continue to monitor respiratory status        3. Pulmonary hypertension II 2/2 acute decompensated diastolic heart failure in the setting of SERENA        Hx of heart failure with preserved EF with last echo done 07/07/2022             Plan      Fluid restriction       Continue metoprolol     4. Moderate Oral Dysphagia         Plan       Mince and moist consistency solids    5. Moderate R pleural effusion       Most recent CXR 07/13/2022      Mild subsegmental atelectasis in the LLL            Plan       Continue cefepime 2000mg IV q12h       Monitor respiratory status     6.  Permanent A-fib       Hx of atrial fibrillation        Plan       Continue with metoprolol Resume Eliquis tomorrow for anticoagulation    7. Pancytopenia complicated by severe thrombocytopenia -Resolving        Platelet count 533 trending up compared 60 yesterday          Plan       CBC q24h to monitor     8. Hx of Parkinson's disease      Sinemet resumed                             Problems resolved during this admission:   Acute encephalopathy   Hypernatremia  Lactic acidosis  Septic shock likely secondary to pneumonia      Inactive Problems :  Acute encephalopathy  Hypernatremia  Lactic acidosis  Septic shock likely secondary to pneumonia          PT/OT: [facility] To be contacted   DVT ppx: None   GI ppx: Famotidine 10mg       Next of Kin/ POA:  Daughter     Code Status:   Full code     Disposition: Continue current care       Otoniel Sanders MD, PGY-1  Attending physician: Dr. Apurva Yanes. Kenisha Vicente MD, FACP        NOTE: This report was transcribed using voice recognition software. Every effort was made to ensure accuracy; however, inadvertent computerized transcription errors may be present. Senior Resident Addendum:  I have seen and examined the patient with the intern. I have discussed the case, including pertinent history and exam findings with the intern. I agree with the assessment, plan and orders as documented above with the following additions:    Consults:   Pulmonology  Nephrology    Assessment:  Acute hypoxic hypercapnic respiratory failure 2/2 mod R pleural effusion, possible HAP vs acute decompensated  HFpEF exacerbation (EF 70-75%)- IMPROVING on 2L O2   Pulmonary hypertension likely Type II 2/2 acute decompensated diastolic heart failure, in the setting of Hx SERENA- STABLE  Moderate R pleural effusion, likely 2/2 HAP (Klebsiella pneumoniae) vs acute decompensated heart failure. Chest tube insitu, 245 cc output in 24 hrs   HAP (Klebsiella pneumoniae on resp culture, light growth).  On cefepime to complete 10 days of antibiotics   Permanent atrial fibrillation, s/p DCCV (April and May 2022), now rate controlled. Acute on chronic macrocytic anemia, multifactorial, likely 2/2 chronic disease, blood draws,  Less likely hemolysis (no schistocytes, haptoglobin wnl) s/p 1 unit PRBC 7/11,    Prolonged INR- now downtrending   Low grade fever, multifactorial, ? meds (Precedex?), CLABSI?, no new leukocytosis, or hemodynamic instability   . Hx of asthma   Hx of SERENA. Not using CPAP at home  . Hx of HTN. -160`s  . Hx of CAD  . Hx of Type 2 DM with neuropathy. On Lantus 10 and Novolog SS at home  . Hx of depression/anxiety. Hx of restless leg syndrome  . Hx of Parkinson's disease. On Sinemet and ropinirole at home, which was discontinued upon discharge at [address]:  . Acute encephalopathy, multifactorial 2/2 septic shock, digoxin toxicity, uremia,resolved  Shock, likely septic (HAP), digoxin toxicity   . Bradycardia, hypotension 2/2 Digoxin toxicity in the setting of SHANTANU. Digoxin level 3.7>>2   HAGMA 2/2 lactic acidosis (hypoperfusion, septic shock?),  uremia 2/2 SHANTANU, improving. Lactic acid down to 1.7   Hyperkalemia 2/2 SHANTANU, resolved. . Elevated pro-BNP, multifactorial, likely acute decompensated HFpEF, septic shock. Elevated troponin likely 2/2 demand ischemia  Hypernatremia   SHANTANU Stage III on CKD multifactorial, pre-renal (hemodynamically mediated, DHN 2/2 diuretic use, poor oral intake); vs ATN 2/2 septic shock.     Metabolic alkalosis likely contraction alkalosis 2/2 diuresis, poor oral intake- improving  Pancytopenia likely 2/2 BM suppression 2/2 chronic illness, meds (Zosyn)-  Thrombocytopenia,likely 2/2 #11, less likely HIT (not exposed to heparin products), DIC, hemolysis ruled out (work-up negative)        Plan:  Septic shock resolved, to complete 10 days of antibiotics  Chest tube to be removed , repeat CXR is ok , awaiting pulm clearance, chest tube to suction , still draining 250 cc in 24 hrs  Resume eliquis tomorrow (for AFIB) and possibly aspirin (for CAD)  Discharge planning for [facility] - process initiated     PT/OT: [facility] 9/24  DVT ppx: held   GI ppx: protonix    Disposition:all problems resolving, discharge planning initiated, resume eliquis,ASA tomorrow,     Tammi Adair MD, MD PGY-3  7/14/2022  5:27 PM

## 2022-07-15 ENCOUNTER — APPOINTMENT (OUTPATIENT)
Dept: GENERAL RADIOLOGY | Age: 72
DRG: 870 | End: 2022-07-15
Payer: MEDICARE

## 2022-07-15 PROBLEM — G93.40 ENCEPHALOPATHY ACUTE: Status: ACTIVE | Noted: 2022-07-15

## 2022-07-15 PROBLEM — A41.9 SEPTIC SHOCK (HCC): Status: ACTIVE | Noted: 2022-07-05

## 2022-07-15 PROBLEM — R65.21 SEPTIC SHOCK (HCC): Status: ACTIVE | Noted: 2022-07-05

## 2022-07-15 PROBLEM — J96.01 ACUTE RESPIRATORY FAILURE WITH HYPOXIA (HCC): Status: ACTIVE | Noted: 2022-07-15

## 2022-07-15 PROBLEM — D69.6 THROMBOCYTOPENIA (HCC): Status: ACTIVE | Noted: 2022-07-15

## 2022-07-15 PROBLEM — R41.0 DELIRIUM: Status: ACTIVE | Noted: 2022-07-15

## 2022-07-15 PROBLEM — E87.20 LACTIC ACIDOSIS: Status: ACTIVE | Noted: 2022-07-15

## 2022-07-15 LAB
ALBUMIN SERPL-MCNC: 3.1 G/DL (ref 3.5–5.2)
ALP BLD-CCNC: 77 U/L (ref 35–104)
ALT SERPL-CCNC: <5 U/L (ref 0–32)
ANION GAP SERPL CALCULATED.3IONS-SCNC: 8 MMOL/L (ref 7–16)
ANISOCYTOSIS: ABNORMAL
APTT: 24.7 SEC (ref 24.5–35.1)
AST SERPL-CCNC: 15 U/L (ref 0–31)
BASOPHILS ABSOLUTE: 0 E9/L (ref 0–0.2)
BASOPHILS RELATIVE PERCENT: 0 % (ref 0–2)
BILIRUB SERPL-MCNC: 0.8 MG/DL (ref 0–1.2)
BUN BLDV-MCNC: 24 MG/DL (ref 6–23)
CALCIUM SERPL-MCNC: 8.1 MG/DL (ref 8.6–10.2)
CHLORIDE BLD-SCNC: 104 MMOL/L (ref 98–107)
CO2: 30 MMOL/L (ref 22–29)
CREAT SERPL-MCNC: 1 MG/DL (ref 0.5–1)
CULTURE CATHETER TIP: NORMAL
EOSINOPHILS ABSOLUTE: 0.06 E9/L (ref 0.05–0.5)
EOSINOPHILS RELATIVE PERCENT: 1 % (ref 0–6)
GFR AFRICAN AMERICAN: >60
GFR NON-AFRICAN AMERICAN: 54 ML/MIN/1.73
GLUCOSE BLD-MCNC: 131 MG/DL (ref 74–99)
HCT VFR BLD CALC: 25.3 % (ref 34–48)
HCT VFR BLD CALC: 26.2 % (ref 34–48)
HEMOGLOBIN: 7.4 G/DL (ref 11.5–15.5)
HEMOGLOBIN: 7.8 G/DL (ref 11.5–15.5)
HYPOCHROMIA: ABNORMAL
INR BLD: 1.3
LYMPHOCYTES ABSOLUTE: 0.99 E9/L (ref 1.5–4)
LYMPHOCYTES RELATIVE PERCENT: 18 % (ref 20–42)
MAGNESIUM: 1.9 MG/DL (ref 1.6–2.6)
MAGNESIUM: 1.9 MG/DL (ref 1.6–2.6)
MCH RBC QN AUTO: 27.1 PG (ref 26–35)
MCHC RBC AUTO-ENTMCNC: 29.8 % (ref 32–34.5)
MCV RBC AUTO: 91 FL (ref 80–99.9)
METER GLUCOSE: 138 MG/DL (ref 74–99)
METER GLUCOSE: 138 MG/DL (ref 74–99)
METER GLUCOSE: 205 MG/DL (ref 74–99)
METER GLUCOSE: 95 MG/DL (ref 74–99)
MONOCYTES ABSOLUTE: 0.6 E9/L (ref 0.1–0.95)
MONOCYTES RELATIVE PERCENT: 11 % (ref 2–12)
NEUTROPHILS ABSOLUTE: 3.85 E9/L (ref 1.8–7.3)
NEUTROPHILS RELATIVE PERCENT: 70 % (ref 43–80)
OVALOCYTES: ABNORMAL
PDW BLD-RTO: 15.3 FL (ref 11.5–15)
PHOSPHORUS: 2.6 MG/DL (ref 2.5–4.5)
PHOSPHORUS: 3.1 MG/DL (ref 2.5–4.5)
PLATELET # BLD: 156 E9/L (ref 130–450)
PMV BLD AUTO: 11.6 FL (ref 7–12)
POIKILOCYTES: ABNORMAL
POLYCHROMASIA: ABNORMAL
POTASSIUM SERPL-SCNC: 3.8 MMOL/L (ref 3.5–5)
PROTHROMBIN TIME: 15 SEC (ref 9.3–12.4)
RBC # BLD: 2.88 E12/L (ref 3.5–5.5)
SODIUM BLD-SCNC: 142 MMOL/L (ref 132–146)
SOLUBLE TRANSFERRIN RECEPT: 2.5 MG/L (ref 1.9–4.4)
TEAR DROP CELLS: ABNORMAL
TOTAL PROTEIN: 5.1 G/DL (ref 6.4–8.3)
WBC # BLD: 5.5 E9/L (ref 4.5–11.5)

## 2022-07-15 PROCEDURE — 6370000000 HC RX 637 (ALT 250 FOR IP): Performed by: INTERNAL MEDICINE

## 2022-07-15 PROCEDURE — 6370000000 HC RX 637 (ALT 250 FOR IP): Performed by: STUDENT IN AN ORGANIZED HEALTH CARE EDUCATION/TRAINING PROGRAM

## 2022-07-15 PROCEDURE — 6360000002 HC RX W HCPCS: Performed by: INTERNAL MEDICINE

## 2022-07-15 PROCEDURE — 85025 COMPLETE CBC W/AUTO DIFF WBC: CPT

## 2022-07-15 PROCEDURE — 2500000003 HC RX 250 WO HCPCS

## 2022-07-15 PROCEDURE — 6360000002 HC RX W HCPCS: Performed by: STUDENT IN AN ORGANIZED HEALTH CARE EDUCATION/TRAINING PROGRAM

## 2022-07-15 PROCEDURE — A4216 STERILE WATER/SALINE, 10 ML: HCPCS | Performed by: STUDENT IN AN ORGANIZED HEALTH CARE EDUCATION/TRAINING PROGRAM

## 2022-07-15 PROCEDURE — 2580000003 HC RX 258: Performed by: INTERNAL MEDICINE

## 2022-07-15 PROCEDURE — 2060000000 HC ICU INTERMEDIATE R&B

## 2022-07-15 PROCEDURE — 2700000000 HC OXYGEN THERAPY PER DAY

## 2022-07-15 PROCEDURE — 71045 X-RAY EXAM CHEST 1 VIEW: CPT

## 2022-07-15 PROCEDURE — 2580000003 HC RX 258: Performed by: STUDENT IN AN ORGANIZED HEALTH CARE EDUCATION/TRAINING PROGRAM

## 2022-07-15 PROCEDURE — 85610 PROTHROMBIN TIME: CPT

## 2022-07-15 PROCEDURE — 99233 SBSQ HOSP IP/OBS HIGH 50: CPT | Performed by: INTERNAL MEDICINE

## 2022-07-15 PROCEDURE — 2580000003 HC RX 258

## 2022-07-15 PROCEDURE — 83735 ASSAY OF MAGNESIUM: CPT

## 2022-07-15 PROCEDURE — 92526 ORAL FUNCTION THERAPY: CPT

## 2022-07-15 PROCEDURE — 94660 CPAP INITIATION&MGMT: CPT

## 2022-07-15 PROCEDURE — 85014 HEMATOCRIT: CPT

## 2022-07-15 PROCEDURE — S5553 INSULIN LONG ACTING 5 U: HCPCS | Performed by: INTERNAL MEDICINE

## 2022-07-15 PROCEDURE — 82962 GLUCOSE BLOOD TEST: CPT

## 2022-07-15 PROCEDURE — 36415 COLL VENOUS BLD VENIPUNCTURE: CPT

## 2022-07-15 PROCEDURE — 85730 THROMBOPLASTIN TIME PARTIAL: CPT

## 2022-07-15 PROCEDURE — 84100 ASSAY OF PHOSPHORUS: CPT

## 2022-07-15 PROCEDURE — 80053 COMPREHEN METABOLIC PANEL: CPT

## 2022-07-15 PROCEDURE — C9113 INJ PANTOPRAZOLE SODIUM, VIA: HCPCS | Performed by: STUDENT IN AN ORGANIZED HEALTH CARE EDUCATION/TRAINING PROGRAM

## 2022-07-15 PROCEDURE — 85018 HEMOGLOBIN: CPT

## 2022-07-15 PROCEDURE — 99232 SBSQ HOSP IP/OBS MODERATE 35: CPT | Performed by: INTERNAL MEDICINE

## 2022-07-15 PROCEDURE — 94640 AIRWAY INHALATION TREATMENT: CPT

## 2022-07-15 RX ORDER — FUROSEMIDE 10 MG/ML
40 INJECTION INTRAMUSCULAR; INTRAVENOUS ONCE
Status: COMPLETED | OUTPATIENT
Start: 2022-07-15 | End: 2022-07-15

## 2022-07-15 RX ADMIN — BUDESONIDE 500 MCG: 0.5 SUSPENSION RESPIRATORY (INHALATION) at 19:30

## 2022-07-15 RX ADMIN — ATORVASTATIN CALCIUM 20 MG: 20 TABLET, FILM COATED ORAL at 08:52

## 2022-07-15 RX ADMIN — IPRATROPIUM BROMIDE AND ALBUTEROL SULFATE 1 AMPULE: .5; 2.5 SOLUTION RESPIRATORY (INHALATION) at 16:24

## 2022-07-15 RX ADMIN — POTASSIUM PHOSPHATE, MONOBASIC AND POTASSIUM PHOSPHATE, DIBASIC 15 MMOL: 224; 236 INJECTION, SOLUTION, CONCENTRATE INTRAVENOUS at 08:58

## 2022-07-15 RX ADMIN — IPRATROPIUM BROMIDE AND ALBUTEROL SULFATE 1 AMPULE: .5; 2.5 SOLUTION RESPIRATORY (INHALATION) at 08:21

## 2022-07-15 RX ADMIN — SODIUM CHLORIDE, PRESERVATIVE FREE 10 ML: 5 INJECTION INTRAVENOUS at 08:52

## 2022-07-15 RX ADMIN — PANTOPRAZOLE SODIUM 40 MG: 40 INJECTION, POWDER, FOR SOLUTION INTRAVENOUS at 11:37

## 2022-07-15 RX ADMIN — IPRATROPIUM BROMIDE AND ALBUTEROL SULFATE 1 AMPULE: .5; 2.5 SOLUTION RESPIRATORY (INHALATION) at 12:46

## 2022-07-15 RX ADMIN — CEFEPIME 2000 MG: 2 INJECTION, POWDER, FOR SOLUTION INTRAVENOUS at 14:49

## 2022-07-15 RX ADMIN — POLYETHYLENE GLYCOL 3350 17 G: 17 POWDER, FOR SOLUTION ORAL at 20:27

## 2022-07-15 RX ADMIN — INSULIN LISPRO 2 UNITS: 100 INJECTION, SOLUTION INTRAVENOUS; SUBCUTANEOUS at 17:00

## 2022-07-15 RX ADMIN — AMIODARONE HYDROCHLORIDE 200 MG: 200 TABLET ORAL at 08:52

## 2022-07-15 RX ADMIN — METOPROLOL TARTRATE 50 MG: 50 TABLET, FILM COATED ORAL at 20:27

## 2022-07-15 RX ADMIN — CARBIDOPA AND LEVODOPA 2 TABLET: 25; 100 TABLET, EXTENDED RELEASE ORAL at 20:26

## 2022-07-15 RX ADMIN — INSULIN GLARGINE-YFGN 5 UNITS: 100 INJECTION, SOLUTION SUBCUTANEOUS at 20:19

## 2022-07-15 RX ADMIN — SODIUM CHLORIDE, PRESERVATIVE FREE 10 ML: 5 INJECTION INTRAVENOUS at 20:29

## 2022-07-15 RX ADMIN — METOPROLOL TARTRATE 50 MG: 50 TABLET, FILM COATED ORAL at 08:51

## 2022-07-15 RX ADMIN — CARBIDOPA AND LEVODOPA 2 TABLET: 25; 100 TABLET, EXTENDED RELEASE ORAL at 14:47

## 2022-07-15 RX ADMIN — BUDESONIDE 500 MCG: 0.5 SUSPENSION RESPIRATORY (INHALATION) at 08:21

## 2022-07-15 RX ADMIN — FAMOTIDINE 10 MG: 20 TABLET ORAL at 08:52

## 2022-07-15 RX ADMIN — QUETIAPINE FUMARATE 25 MG: 25 TABLET ORAL at 20:27

## 2022-07-15 RX ADMIN — FUROSEMIDE 40 MG: 10 INJECTION, SOLUTION INTRAMUSCULAR; INTRAVENOUS at 17:00

## 2022-07-15 RX ADMIN — CARBIDOPA AND LEVODOPA 2 TABLET: 25; 100 TABLET, EXTENDED RELEASE ORAL at 08:51

## 2022-07-15 RX ADMIN — IPRATROPIUM BROMIDE AND ALBUTEROL SULFATE 1 AMPULE: .5; 2.5 SOLUTION RESPIRATORY (INHALATION) at 19:30

## 2022-07-15 RX ADMIN — Medication 5 MG: at 20:26

## 2022-07-15 RX ADMIN — PANTOPRAZOLE SODIUM 40 MG: 40 INJECTION, POWDER, FOR SOLUTION INTRAVENOUS at 20:28

## 2022-07-15 RX ADMIN — CEFEPIME 2000 MG: 2 INJECTION, POWDER, FOR SOLUTION INTRAVENOUS at 03:00

## 2022-07-15 RX ADMIN — QUETIAPINE FUMARATE 25 MG: 25 TABLET ORAL at 08:51

## 2022-07-15 NOTE — PROGRESS NOTES
[facility] MD Jonathan Cotto MD   84 Ricardo Patel MD .  Progress note  Patient's Name: Indy Muñoz  4:44 PM  7/15/2022      7/7 : seen in her room intubated mechanically ventilated fio2 40 . LE edema 1-2+ . On levophed . UO ok over last 24 hours     7/8 pt not in her room when coming to see her . She is in IR lab. Case d/w RN     7/9 seen in her room d/w ICU resident   Still on some pressors actually BP low when seeing her   Has CT in place with good drainage . Good UO in response to lasix via genao cath . Fio2 40 PEEP 8       7/10 seen in ICU intubated mechanically ventilated fio2 40 PEEP 8   1-2+ edema in UE and LEs   No pressors  On precedex drip     7/11 : seen in ICU extubated earlier today . BP stable on HFNC    7/12 seen in her room ,extubated , on o2/nc . Chest tube in place . Fevers today and precedex drip put on hold .good UO over last 24 hours     7/13 : seen in her room on o2/nc BP stable clinically doing better still with CT along with genao and fecal system    7/14 sitting in chair comfortable on o2/nc BP stable LE edema dependent . 7/15 ; weak on o2/nc . History of Present Ilness:      69-year old female with a past medical history of hypertension, A. fib, CAD, asthma, HFpEF, CKD, SERENA, hyperlipidemia, type II DM, anxiety/depression, Parkinson's disease, restless leg syndrome who presented in the ED for altered mental status. She recently had a right second toe amputation back last month at [facility].  She was discharged after 2 weeks. According to the patient's family, she was doing okay until few hours prior to admission, patient was noted to be acting different yesterday morning. She was noted to be drowsy    Nephrology asked to see for SHANTANU   I did see pt in ICU she is intubated mechanically ventilated . She is on levophed . She has some LE edema. Vent setting stable .    UO marginal .       Past Medical History:   Diagnosis Date A-fib ([facility]     Acute on chronic congestive heart failure (HCC)     Anxiety     Asthma     CAD (coronary artery disease) 01/21/2016    Cancer ([facility]  breast ca 2006    right lumpectomy    Chronic kidney disease     nephrolithiasis    Depression     Diabetes mellitus ([facility]     H/O mammogram     Hx MRSA infection     toe infection january 2012    Hyperlipidemia     Hypertension     Lateral epicondylitis     SERENA on CPAP     Parkinson's disease ([facility]     Tubal ligation status        Past Surgical History:   Procedure Laterality Date    BREAST LUMPECTOMY      BREAST REDUCTION SURGERY      CARDIAC CATHETERIZATION  4/28/2014    Dr. Shahbaz Conner  01/11/2022    Dr. Latosha Neff  7/29/15    CT GUIDED CHEST TUBE  7/8/2022    CT GUIDED CHEST TUBE 7/8/2022 Cee Willingham MD [facility] CT    ENDOSCOPY, COLON, DIAGNOSTIC  7/19/15    GALLBLADDER SURGERY      LUMBAR LAMINECTOMY      TOE AMPUTATION      TONSILLECTOMY      UPPER GASTROINTESTINAL ENDOSCOPY         Family History   Problem Relation Age of Onset    Cancer Mother         Lung Ca    Cancer Father         lung Ca    Diabetes Sister     Heart Disease Sister     Seizures Son     Other Brother         sepsis        reports that she has never smoked. She has never used smokeless tobacco. She reports that she does not drink alcohol and does not use drugs.     Allergies:  Ciprofloxacin and Codeine    Current Medications:    pantoprazole (PROTONIX) 40 mg in sodium chloride (PF) 10 mL injection, Q12H  furosemide (LASIX) injection 40 mg, Once  QUEtiapine (SEROQUEL) tablet 25 mg, BID  metoprolol tartrate (LOPRESSOR) tablet 50 mg, BID  melatonin disintegrating tablet 5 mg, Nightly  labetalol (NORMODYNE;TRANDATE) injection 10 mg, Q6H PRN  hydrALAZINE (APRESOLINE) injection 10 mg, Q6H PRN  amiodarone (CORDARONE) tablet 200 mg, Daily  insulin lispro (HUMALOG) injection vial 0-6 Units, 4x Daily AC & HS  carbidopa-levodopa (SINEMET CR)  MG SEGSPCT 74 08/20/2013 10:45 AM    METASPCT 0.9 07/14/2022 05:31 AM    LYMPHOPCT 18.0 07/15/2022 05:32 AM    MONOPCT 11.0 07/15/2022 05:32 AM    MYELOPCT 2.6 07/14/2022 05:31 AM    EOSPCT 1 06/16/2017 12:00 AM    BASOPCT 0.0 07/15/2022 05:32 AM    MONOSABS 0.60 07/15/2022 05:32 AM    LYMPHSABS 0.99 07/15/2022 05:32 AM    EOSABS 0.06 07/15/2022 05:32 AM    BASOSABS 0.00 07/15/2022 05:32 AM     CMP:    Lab Results   Component Value Date/Time     07/15/2022 05:32 AM    K 3.8 07/15/2022 05:32 AM    K 5.0 07/06/2022 02:41 AM     07/15/2022 05:32 AM    CO2 30 07/15/2022 05:32 AM    BUN 24 07/15/2022 05:32 AM    CREATININE 1.0 07/15/2022 05:32 AM    GFRAA >60 07/15/2022 05:32 AM    LABGLOM 54 07/15/2022 05:32 AM    GLUCOSE 131 07/15/2022 05:32 AM    PROT 5.1 07/15/2022 05:32 AM    LABALBU 3.1 07/15/2022 05:32 AM    CALCIUM 8.1 07/15/2022 05:32 AM    BILITOT 0.8 07/15/2022 05:32 AM    ALKPHOS 77 07/15/2022 05:32 AM    AST 15 07/15/2022 05:32 AM    ALT <5 07/15/2022 05:32 AM     Ionized Calcium:  No results found for: IONCA  Magnesium:    Lab Results   Component Value Date/Time    MG 1.9 07/15/2022 05:32 AM     Phosphorus:    Lab Results   Component Value Date/Time    PHOS 2.6 07/15/2022 05:32 AM     U/A:    Lab Results   Component Value Date/Time    COLORU Yellow 07/05/2022 05:43 PM    PHUR 5.5 07/05/2022 05:43 PM    WBCUA 1-3 07/05/2022 05:43 PM    RBCUA NONE 07/05/2022 05:43 PM    RBCUA NONE 03/25/2013 09:00 PM    YEAST RARE 10/27/2015 07:18 PM    BACTERIA FEW 07/05/2022 05:43 PM    CLARITYU Clear 07/05/2022 05:43 PM    SPECGRAV 1.025 07/05/2022 05:43 PM    LEUKOCYTESUR Negative 07/05/2022 05:43 PM    UROBILINOGEN 0.2 07/05/2022 05:43 PM    BILIRUBINUR Negative 07/05/2022 05:43 PM    BLOODU Negative 07/05/2022 05:43 PM    GLUCOSEU Negative 07/05/2022 05:43 PM     Microalbumen/Creatinine ratio:  No components found for: RUCREAT  Iron Saturation:  No components found for: PERCENTFE  TIBC:    Lab Results Component Value Date/Time    TIBC 152 07/09/2022 08:01 AM     FERRITIN:    Lab Results   Component Value Date/Time    FERRITIN 243 07/09/2022 08:01 AM        Imaging:  XR CHEST PORTABLE   Final Result   No sizable pleural effusion. XR CHEST PORTABLE   Final Result   Mild subsegmental atelectasis in the left lower lobe. XR CHEST PORTABLE   Final Result   Grossly stable pulmonary opacities in the lower left lung, which may   represent atelectasis or pneumonia. XR CHEST PORTABLE   Final Result   1. Patchy right perihilar and left lower lobe airspace disease   2. Status post removal of the endotracheal tube and NG tube      3. There is no pneumothorax. XR CHEST PORTABLE   Final Result   Lines and tubes are stable with no pneumothorax on the right. Some   improvement seen in the region of left lung base in the interval.         XR CHEST PORTABLE   Final Result   The evaluation is limited due to low lung volumes. No interval change compared to prior exam.      Lines and tubes are in unchanged position. XR ABDOMEN FOR NG/OG/NE TUBE PLACEMENT   Final Result   Catheter is in the stomach. XR CHEST PORTABLE   Final Result   Catheter placed with significant right lung aeration improvement and no   pneumothorax         MRI BRAIN WO CONTRAST   Final Result   1. No acute intracranial abnormality. 2. No gross epileptogenic lesion is identified. 3. Bilateral mastoid effusions, which may be due to eustachian tube   dysfunction or otomastoiditis. RECOMMENDATIONS:   Unavailable         CT GUIDED CHEST TUBE   Final Result   Successful uncomplicated  right chest tube placement      Recommendations:   1. Follow-up tube check in 2 weeks   2. Flush tube daily with 5 to 10 mL of sterile saline            XR CHEST PORTABLE   Final Result   1. There is no interval change in the bilateral perihilar and lower lobe   airspace disease more prominent within the right lung.    2. Moderate size right pleural effusion. 3. There is no pneumothorax. US RETROPERITONEAL COMPLETE   Final Result   1. Marked bilateral renal cortical thinning. Echogenic renal parenchyma. No hydronephrosis. 2.  A Diaz catheter appears to be decompressing the bladder. XR CHEST PORTABLE   Final Result   Increasing infiltrate throughout the right lung persistent left basilar   alveolar infiltrate is well. XR CHEST PORTABLE   Final Result   Adequately positioned right internal jugular central venous line. Otherwise,   no significant change compared to prior exam glued in lines and tubes. US DUP LOWER EXTREMITIES BILATERAL VENOUS   Final Result   Within the visualized vessels there is no evidence for deep venous   thrombosis               XR CHEST PORTABLE   Final Result   1. No significant change. Cardiomegaly with right pleural effusion. 2.  Support tubes remain in appropriate position. CT HEAD WO CONTRAST   Final Result   No acute intracranial abnormality or significant change in the overall   appearance of the brain. MRI would be useful if symptoms persist.      RECOMMENDATIONS:   Unavailable         XR ABDOMEN FOR NG/OG/NE TUBE PLACEMENT   Final Result   Catheter is in the proximal stomach. XR CHEST PORTABLE   Final Result   Adequately positioned endotracheal tube. Nasogastric tube coursing below   diaphragm. Otherwise, stable exam.         CT ABDOMEN PELVIS WO CONTRAST Additional Contrast? None   Final Result   Increasing fluid within the abdomen when compared to the prior study. The   anasarca is also increased in appearance of the prior examination. Mild stranding seen around the mesentery which correlation to clinical lab   values is recommended to exclude possible pancreatitis or volume overload. Overall the appearance of the pancreas itself is grossly unremarkable. There   is only mild stranding seen throughout the mesenteric root. CT CHEST WO CONTRAST   Final Result   Bilateral lung infiltrates with greatest consolidation right lower lobe   probably secondary to atelectasis and or pneumonia. Moderate right pleural effusion. Cardiomegaly and small pericardial effusion. The pericardial effusion is new. Small volume right upper abdominal ascites along with anasarca. This is new. XR CHEST PORTABLE   Final Result   New opacity on the right which may relate to pleural effusion with adjacent   atelectasis and or infiltrate. Cardiomegaly and pulmonary vascular   congestion implying elevated left heart filling pressures. Assessment    Acute kidney injury non oligouric: improved   Baseline cr 0.9  Etiology of SHANTANU due to decrease effective in setting of intravascular volume depletion as evident by her need for levophed and her urine lytes with high avidity for cl and Na . Basically bland urine sediment which make GN/vasculitis unlikely   High BUN in part due to steroids   LE edema noted though she is still on levophed         -Encephalopathy metabolic multifactorial : better     -Digoxin toxicity s/p digbind    -Shock 2/2 PNA : resolved    -Right Pleural Effusion with catheter in place for drainage      Plan :      SHANTANU resolving   Redose lasix 40 iv once today     Electronically signed by Raelene Heimlich, MD on 7/15/2022 at 4:44 PM

## 2022-07-15 NOTE — CARE COORDINATION
Message left with son again today regarding choices. Called daughter, she did receive choices from son yesterday, 1. [facility], referral pending they may not have beds, 2. [facility] referral pending, 3. [facility], 4. [facility]. For questions I can be reached at [phone]. [place], [place]    The Plan for Transition of Care is related to the following treatment goals: discharge planning when stable     The Patient and/or patient representative Rosaline Speaks was provided with a choice of provider and agrees   with the discharge plan. [x] Yes [] No    Freedom of choice list was provided with basic dialogue that supports the patient's individualized plan of care/goals, treatment preferences and shares the quality data associated with the providers.  [x] Yes [] No

## 2022-07-15 NOTE — PROGRESS NOTES
[facility]  Department of Internal Medicine  Division of Pulmonary, Critical Care and Sleep Medicine  Progress Note    Matteo Escobar DO, MPH, [facility], [facility], [address] [facility] Trini CRABTREE MD      Patient: Mamadou Delgado  MRN: [id number]  : 1950    Encounter Time: 3:59 PM     Date of Admission: 2022 12:25 PM    Primary Care Physician: No primary care provider on file. Reason for Consultation: Drain CT management     SUBJECTIVE:    Overnight event was significant for a phosphorus level 1.8 - replaced. OBJECTIVE:     PHYSICAL EXAM:   VITALS:   Vitals:    22 1914 22 2145 07/15/22 0730 07/15/22 1403   BP:  116/69 (!) 104/52 96/60   Pulse: 82 (!) 117 81 97   Resp:    Temp:  98.4 °F (36.9 °C) 98.1 °F (36.7 °C) 98.6 °F (37 °C)   TempSrc:  Oral Oral Oral   SpO2: 94%      Weight:       Height:            Intake/Output Summary (Last 24 hours) at 7/15/2022 1559  Last data filed at 7/15/2022 0730  Gross per 24 hour   Intake 120 ml   Output 300 ml   Net -180 ml          CONSTITUTIONAL:    Alert  SKIN:     No rash,   HEENT:     EOMI, MMM, No thrush  NECK:    No bruits, No JVP appreciated  CV:      Sinus,  No murmur, No rubs, No gallops  PULMONARY:   Couse BS,  No Wheezing, No Rales, No Rhonchi      CT noted  ABDOMEN:     Soft, non-tender. BS normal. No R/R/G  EXT:    No deformities . No clubbing. Edema ower extremity edema, No venous stasis  PULSE:   Appears equal and palpable.   PSYCHIATRIC:  Seems appropriate, No acute psychosis  MS:    Gross weakness  NEUROLOGIC:   The clinical assessment is non-focal     DATA: IMAGING & TESTING:     LABORATORY TESTS:    CBC:   Lab Results   Component Value Date/Time    WBC 5.5 07/15/2022 05:32 AM    RBC 2.88 07/15/2022 05:32 AM    HGB 7.8 07/15/2022 05:32 AM    HCT 26.2 07/15/2022 05:32 AM    MCV 91.0 07/15/2022 05:32 AM    MCH 27.1 07/15/2022 05:32 AM    MCHC 29.8 07/15/2022 05:32 AM RDW 15.3 07/15/2022 05:32 AM     07/15/2022 05:32 AM    MPV 11.6 07/15/2022 05:32 AM     CMP:    Lab Results   Component Value Date/Time     07/15/2022 05:32 AM    K 3.8 07/15/2022 05:32 AM    K 5.0 07/06/2022 02:41 AM     07/15/2022 05:32 AM    CO2 30 07/15/2022 05:32 AM    BUN 24 07/15/2022 05:32 AM    CREATININE 1.0 07/15/2022 05:32 AM    GFRAA >60 07/15/2022 05:32 AM    LABGLOM 54 07/15/2022 05:32 AM    GLUCOSE 131 07/15/2022 05:32 AM    PROT 5.1 07/15/2022 05:32 AM    LABALBU 3.1 07/15/2022 05:32 AM    CALCIUM 8.1 07/15/2022 05:32 AM    BILITOT 0.8 07/15/2022 05:32 AM    ALKPHOS 77 07/15/2022 05:32 AM    AST 15 07/15/2022 05:32 AM    ALT <5 07/15/2022 05:32 AM     Calcium:    Lab Results   Component Value Date/Time    CALCIUM 8.1 07/15/2022 05:32 AM     Ionized Calcium:  No results found for: IONCA  Magnesium:    Lab Results   Component Value Date/Time    MG 1.9 07/15/2022 05:32 AM     Phosphorus:    Lab Results   Component Value Date/Time    PHOS 2.6 07/15/2022 05:32 AM     PT/INR:    Lab Results   Component Value Date/Time    PROTIME 15.0 07/15/2022 05:32 AM    INR 1.3 07/15/2022 05:32 AM        PRO-BNP:   Lab Results   Component Value Date    PROBNP 37,248 (H) 07/12/2022    PROBNP 41,368 (H) 07/06/2022      ABGs:   Lab Results   Component Value Date/Time    PH 7.452 07/11/2022 05:07 AM    PO2 133.6 07/11/2022 05:07 AM    PCO2 40.0 07/11/2022 05:07 AM     Hemoglobin A1C: No components found for: HGBA1C    IMAGING:  Imaging tests were completed and reviewed and discussed radiology and care team involved and reveals   Echo Complete    Result Date: 7/7/2022  Findings   Left Ventricle  Micro-bubble contrast injected to enhance left ventricular visualization. Normal left ventricular size and systolic function. Ejection fraction is visually estimated at 70-75%. Indeterminate diastolic function. No regional wall motion abnormalities seen. Mild left ventricular concentric hypertrophy noted. Right Ventricle  Moderately dilated right ventricle. Right ventricle global systolic function is reduced. TAPSE 1.1 cm. Left Atrium  The left atrium is moderate-severely dilated. JUANY 48 ml/m2. The interatrial septum appears intact. Right Atrium  Mildly enlarged right atrium. Mitral Valve  Structurally normal mitral valve. No evidence of mitral valve stenosis. Physiologic mitral regurgitation. Tricuspid Valve  The tricuspid valve appears structurally normal.  Mild tricuspid regurgitation. RVSP is at least 60 mmHg. Aortic Valve  Individual aortic valve leaflets are not clearly visualized. No hemodynamically significant aortic stenosis is present. No evidence of aortic valve regurgitation. Pulmonic Valve  The pulmonic valve was not well visualized. No evidence of pulmonic valve stenosis. No evidence of any pulmonic regurgitation. Pericardial Effusion  Prominent pericardial fat pad. No definitive evidence of pericardial effusion. Aorta  Aortic root dimension within normal limits. Miscellaneous  Dilated Inferior Vena Cava. Conclusions   Summary  Technically difficult study - limited visualization. Micro-bubble contrast injected to enhance left ventricular visualization. Normal left ventricular size and systolic function. Ejection fraction is visually estimated at 70-75%. Indeterminate diastolic function. No regional wall motion abnormalities seen. Mild left ventricular concentric hypertrophy noted. Moderately dilated right ventricle with reduced function. Biatrial dilation. Mild tricuspid regurgitation. RVSP is at least 60 mmHg. Prominent pericardial fat pad. No definitive evidence of pericardial effusion. Result Date: 7/5/2022  FINDINGS: Lower Chest: See the CT report of the chest dated the same date for full details. Organs: The liver is grossly unremarkable. The spleen is unremarkable. Gallbladder is been surgically removed.   The pancreas reveals some mild fatty replacement. Both adrenal glands are within normal limits. The kidneys are unremarkable in appearance. GI/Bowel: Stomach is within normal limits. No mucosal abnormality. Stool seen scattered diffusely throughout the colon. There is no wall thickening. No mucosal abnormality or distension of the small bowel. Pelvis: Pelvic organs reveal mild wall thickening of the bladder with incomplete distension. The uterus is grossly unremarkable. Peritoneum/Retroperitoneum: There is no abdominal retroperitoneal lymphadenopathy. There is mild stranding identified in the root of the mesentery as well as surrounding the pancreas in which mild inflammation of the pancreas cannot be completely excluded. Correlation to clinical lab values is recommended. No pelvic adenopathy with moderate atherosclerotic disease seen within the abdominal aorta and iliac vessels. Free fluid identified throughout the upper abdomen. Extensive vascular calcifications seen within the mesenteric vessels as well as the abdominal aorta. Bones/Soft Tissues: The bony structures reveal extensive degenerative changes seen within the spine and pelvis. Severe degenerative changes seen within the sacroiliac joints bilaterally right greater than left some sclerosis on the right to suggest prior healed sacroiliitis. There is extensive anasarca seen within the subcutaneous tissues. Increasing fluid within the abdomen when compared to the prior study. The anasarca is also increased in appearance of the prior examination. Mild stranding seen around the mesentery which correlation to clinical lab values is recommended to exclude possible pancreatitis or volume overload. Overall the appearance of the pancreas itself is grossly unremarkable. There is only mild stranding seen throughout the mesenteric root. CT HEAD WO CONTRAST  Result Date: 7/5/2022  No acute intracranial abnormality or significant change in the overall appearance of the brain.  MRI would be useful if symptoms persist. RECOMMENDATIONS: Unavailable     CT CHEST WO CONTRAST    Result Date: 7/5/2022  FINDINGS: Mediastinum: The cardiac size is enlarged. There is a small pericardial effusion. Minimal probable gas in the right atrium is likely secondary to recent intravenous injection. The esophagus is normal.  No definite mediastinal masses or lymphadenopathy. Lungs/pleura: There is consolidation posterior right upper lobe and right middle lobe with linear density in the lingula and at the left posterior lung base. There is right lower lobe consolidation and moderate right pleural effusion. Upper Abdomen: There is a small volume of right upper quadrant ascites. There are surgical clips in the gallbladder fossa and right upper anterior abdominal wall. Soft Tissues/Bones: There is mild induration of the subcutaneous fat. There is moderate lower thoracic spondylosis with slight levoconvex curvature of the thoracic spine. No definite lytic or blastic osseous lesions. Bilateral lung infiltrates with greatest consolidation right lower lobe probably secondary to atelectasis and or pneumonia. Moderate right pleural effusion. Cardiomegaly and small pericardial effusion. The pericardial effusion is new. Small volume right upper abdominal ascites along with anasarca. This is new. CT GUIDED CHEST TUBE  Result Date: 7/8/2022  [facility] After obtaining informed consent, following the routine sterile prep and drape and after the administration of local anesthesia a needle was inserted into the right pleural space . Serous fluid was aspirated. Subsequently a guidewire was passed through the needle. Subsequently the needle was removed and over the guidewire the tract was dilated. Following dilatation a locking loop catheter was placed. Post procedure CT scan reveals the tube to be in good position. Specimen was sent to the laboratory. The patient tolerated the procedure well.  There were no complications. Prior to the procedure a timeout occurred at  1542 hours. The patient received 9 second  of  fluoroscopy. The patient received local anesthesia. The patient was monitored for 60 minutes by a registered nurse. Successful uncomplicated  right chest tube placement Recommendations: 1. Follow-up tube check in 2 weeks 2. Flush tube daily with 5 to 10 mL of sterile saline     MRI BRAIN WO CONTRAST  Result Date: 7/8/2022  FINDINGS: INTRACRANIAL STRUCTURES/VENTRICLES: There is no acute infarct. No mass effect, edema or hemorrhage is seen. Mild volume loss is seen in the cerebrum with mild chronic microvascular ischemic changes. No hydrocephalus or extra-axial fluid is seen. ORBITS: Prosthetic lenses are seen in the globes bilaterally. The orbits are otherwise grossly unremarkable. SINUSES: Mild mucosal thickening in the paranasal sinuses. There is pooling of secretions in the nasopharynx. Moderate bilateral mastoid effusions. BONES/SOFT TISSUES: The bone marrow signal intensity appears normal. The soft tissues demonstrate no acute abnormality. 1.  No acute intracranial abnormality. 2. No gross epileptogenic lesion is identified. 3. Bilateral mastoid effusions, which may be due to eustachian tube dysfunction or otomastoiditis. RECOMMENDATIONS: Unavailable     US DUP LOWER EXTREMITIES BILATERAL VENOUS  Within the visualized vessels there is no evidence for deep venous thrombosis       Assessment: 1. Acute hypoxic hypercapnic respiratory failure 2/2 mod R pleural effusion, possible HAP vs acute decompensated  HFpEF exacerbation (EF 70-75%)  2. Pulmonary hypertension likely Type II 2/2 acute decompensated diastolic heart failure, in the setting of Hx SERENA  3. Moderate R pleural effusion, likely 2/2 HAP (Klebsiella pneumoniae) vs acute decompensated heart failure. D4 CT, 250 cc output in 24 hrs  4. HAP (Klebsiella pneumoniae on resp culture, light growth). D1 cefepime  5.  Elevated pro-BNP, multifactorial, likely acute decompensated HFpEF, septic shock. 6. Elevated troponin likely 2/2 demand ischemia  7. Permanent atrial fibrillation, s/p DCCV (April and May 2022), now rate controlled. 8. SHANTANU Stage III on CKD multifactorial, pre-renal (hemodynamically mediated, DHN 2/2 diuretic use, poor oral intake); vs ATN 2/2 septic shock. 9. Hypernatremia likely 2/2 over diuresis  10. Metabolic alkalosis likely contraction alkalosis 2/2 diuresis, poor oral intake  11. Pancytopenia likely 2/2 BM suppression 2/2 chronic illness, meds (Zosyn)  12. Acute on chronic macrocytic anemia, multifactorial, likely 2/2 chronic disease, blood draws,  Less likely hemolysis (no schistocytes, haptoglobin wnl) s/p 1 unit PRBC 7/11, FOBT positive today, with pinkish tinge on chest tube output  13. Thrombocytopenia,likely 2/2 #11, less likely HIT (not exposed to heparin products), DIC, hemolysis ruled out (work-up negative)  14. Prolonged INR, ? HFP wnl, no overt bleeding, apixaban and aspirin on hold. Given Vit K yesterday for INR 3.2>2 today  15. Low grade fever, multifactorial, ? meds (Precedex?), CLABSI?, no new leukocytosis, or hemodynamic instability   16. Hx of asthma  17. Hx of SERENA. Not using CPAP at home  18. Hx of HTN. -160`s  19. Hx of CAD  20. Hx of Type 2 DM with neuropathy. On Lantus 10 and Novolog SS at home  21. Hx of depression/anxiety. 22. Hx of restless leg syndrome  23. Hx of Parkinson's disease.  On Sinemet and ropinirole at home, which was discontinued upon discharge at [id number]:   CXR needed  If ok will remove catheter      Oma Elaine DO DO, MPH, [facility]  Professor of Internal Medicine  Pulmonary, Critical Care and Sleep Medicine

## 2022-07-15 NOTE — PROGRESS NOTES
[facility]  Internal Medicine Residency Program  Progress Note - House Team     Patient:  Praneeth Darden 66 y.o. female MRN: [id number]     Date of Service: 7/15/2022     CC: Altered mental status secondary to acute hypoxic hypercapnic respiratory failure likely 2/2 to R pleural effusion, possible HAP vs acute decompensated HF. Days since admission: 10    Subjective     Overnight events: Overnight event is significant for a phosphate replacement because the level was 2.9 and was subsequently replaced. This morning patient was seen and examined at bedside and her complaints are 1 episode of vomiting that had food content, nausea and one episode of diarrhea that was black and sore throat. Otherwise, patient has no other complaint and denies chest pain, shortness of breath, abdominal pain, fever or chills. Objective     Physical Exam:  Vitals: BP (!) 104/52   Pulse 81   Temp 98.1 °F (36.7 °C) (Oral)   Resp 18   Ht 5' (1.524 m)   Wt 238 lb (108 kg)   SpO2 94%   BMI 46.48 kg/m²     I & O - 24hr:   Intake/Output Summary (Last 24 hours) at 7/15/2022 6697  Last data filed at 7/14/2022 2215  Gross per 24 hour   Intake 10 ml   Output 300 ml   Net -290 ml      General Appearance: alert, appears stated age, and cooperative  HEENT:  Head: Normocephalic, no lesions, without obvious abnormality. Head: Normal, normocephalic, atraumatic. Neck: no adenopathy, no carotid bruit, no JVD, supple, symmetrical, trachea midline, and thyroid not enlarged, symmetric, no tenderness/mass/nodules  Lung: rhonchi JUANA  Heart: regular rate and rhythm, S1, S2 normal, no murmur, click, rub or gallop  Abdomen: soft, non-tender; bowel sounds normal; no masses,  no organomegaly  Extremities:  extremities normal, atraumatic, no cyanosis or edema  Musculokeletal: No joint swelling, no muscle tenderness. ROM normal in all joints of extremities.    Neurologic: Mental status: Alert, oriented, thought content appropriate   Chest tube in place   Pertinent Information & Imaging Studies, Consults     CBC with Differential:    Lab Results   Component Value Date/Time    WBC 5.5 07/15/2022 05:32 AM    RBC 2.88 07/15/2022 05:32 AM    HGB 7.8 07/15/2022 05:32 AM    HCT 26.2 07/15/2022 05:32 AM     07/15/2022 05:32 AM    MCV 91.0 07/15/2022 05:32 AM    MCH 27.1 07/15/2022 05:32 AM    MCHC 29.8 07/15/2022 05:32 AM    RDW 15.3 07/15/2022 05:32 AM    NRBC 0.0 03/15/2019 09:10 AM    SEGSPCT 74 08/20/2013 10:45 AM    METASPCT 0.9 07/14/2022 05:31 AM    LYMPHOPCT 12.2 07/14/2022 05:31 AM    MONOPCT 5.2 07/14/2022 05:31 AM    MYELOPCT 2.6 07/14/2022 05:31 AM    EOSPCT 1 06/16/2017 12:00 AM    BASOPCT 0.9 07/14/2022 05:31 AM    MONOSABS 0.40 07/14/2022 05:31 AM    LYMPHSABS 0.97 07/14/2022 05:31 AM    EOSABS 0.35 07/14/2022 05:31 AM    BASOSABS 0.07 07/14/2022 05:31 AM     BUN/Creatinine:    Lab Results   Component Value Date/Time    BUN 24 07/15/2022 05:32 AM    CREATININE 1.0 07/15/2022 05:32 AM     Magnesium:    Lab Results   Component Value Date/Time    MG 1.9 07/15/2022 05:32 AM     Phosphorus:    Lab Results   Component Value Date/Time    PHOS 2.6 07/15/2022 05:32 AM     PTT:    Lab Results   Component Value Date/Time    APTT 24.7 07/15/2022 05:32 AM   [APTT}  U/A:    Lab Results   Component Value Date/Time    COLORU Yellow 07/05/2022 05:43 PM    PROTEINU 30 07/05/2022 05:43 PM    PHUR 5.5 07/05/2022 05:43 PM    WBCUA 1-3 07/05/2022 05:43 PM    RBCUA NONE 07/05/2022 05:43 PM    RBCUA NONE 03/25/2013 09:00 PM    YEAST RARE 10/27/2015 07:18 PM    BACTERIA FEW 07/05/2022 05:43 PM    CLARITYU Clear 07/05/2022 05:43 PM    SPECGRAV 1.025 07/05/2022 05:43 PM    LEUKOCYTESUR Negative 07/05/2022 05:43 PM    UROBILINOGEN 0.2 07/05/2022 05:43 PM    BILIRUBINUR Negative 07/05/2022 05:43 PM    BLOODU Negative 07/05/2022 05:43 PM    GLUCOSEU Negative 07/05/2022 05:43 PM     HgBA1c:    Lab Results   Component Value Date/Time    LABA1C 6.5 05/12/2022 01:44 AM     TSH:    Lab Results   Component Value Date/Time    TSH 4.580 07/06/2022 02:41 AM     VITAMIN B12: No components found for: B12  FOLATE:    Lab Results   Component Value Date/Time    FOLATE 5.8 07/06/2022 03:08 AM       IMAGING:   Imaging Studies:    XR CHEST PORTABLE    Result Date: 7/14/2022  Mild subsegmental atelectasis in the left lower lobe. XR CHEST PORTABLE    Result Date: 7/12/2022  Grossly stable pulmonary opacities in the lower left lung, which may represent atelectasis or pneumonia. XR CHEST PORTABLE    Result Date: 7/11/2022  1. Patchy right perihilar and left lower lobe airspace disease 2. Status post removal of the endotracheal tube and NG tube 3. There is no pneumothorax. XR CHEST PORTABLE    Result Date: 7/10/2022  Lines and tubes are stable with no pneumothorax on the right. Some improvement seen in the region of left lung base in the interval.     XR CHEST PORTABLE    Result Date: 7/9/2022  The evaluation is limited due to low lung volumes. No interval change compared to prior exam. Lines and tubes are in unchanged position. XR CHEST PORTABLE    Result Date: 7/8/2022  Catheter placed with significant right lung aeration improvement and no pneumothorax     CT GUIDED CHEST TUBE    Result Date: 7/8/2022  Successful uncomplicated  right chest tube placement Recommendations: 1. Follow-up tube check in 2 weeks 2. Flush tube daily with 5 to 10 mL of sterile saline     XR ABDOMEN FOR NG/OG/NE TUBE PLACEMENT    Result Date: 7/8/2022  Catheter is in the stomach. MRI BRAIN WO CONTRAST    Result Date: 7/8/2022  1. No acute intracranial abnormality. 2. No gross epileptogenic lesion is identified. 3. Bilateral mastoid effusions, which may be due to eustachian tube dysfunction or otomastoiditis.  RECOMMENDATIONS: Unavailable            PROCEDURES: Chest tube in place waiting for removal from pulmonary      Notable Cultures:      Blood cultures   Blood Culture, Routine   Date Value Ref Range Status   07/05/2022 5 Days no growth  Final     Respiratory cultures No results found for: RESPCULTURE   Gram Stain Result   Date Value Ref Range Status   07/08/2022 Refer to ordered Gram stain for results  Final     Urine   Creatinine Ur POCT   Date Value Ref Range Status   06/17/2019 50 mg/dl  Final     Urine Culture, Routine   Date Value Ref Range Status   07/05/2022 Growth not present  Final     Legionella No results found for: LABLEGI  C Diff PCR No results found for: CDIFPCR  Wound culture/abscess: No results for input(s): WNDABS in the last 72 hours. Tip culture:  Recent Labs     07/12/22  1702   CXCATHTIP Growth not present        Antibiotic  Days  Day started   Zosyn  07/05/2022 dcd 7/11    Cefepime    4   07/11/2022                  OXYGENATION: On NC 2L     DIET: Dysphagia - Minced and Moist diet         Resident's Assessment and Plan     Verito Rodriges is a 66 y.o. female with  has a past medical history of A-fib ([address] Acute on chronic congestive heart failure ([address] Anxiety, Asthma, CAD (coronary artery disease), Cancer ([address] Chronic kidney disease, Depression, Diabetes mellitus ([address] H/O mammogram, Hx MRSA infection, Hyperlipidemia, Hypertension, Lateral epicondylitis, SERENA on CPAP, Parkinson's disease ([address] and Tubal ligation status. came here with CC   Chief Complaint   Patient presents with    Altered Mental Status     per ems family reports ams x 45 minutes, recent psych med changes and right toe amputation        SUMMARY [address] [place] [address]: Briefly, Ms. Lucero Ross is a 65 y/o F admitted for acute hypoxic hypercapnic respiratory failure and altered mental status likely secondary to pleural effusion, possible HAP and acute decompensated HFpEF exacerbation (EF 70-75%%), and septic shock likely secondary to pneumonia, hypernatremia, and lactic acidosis. She was intubated and placed in the ICU, however, was extubated after improvement on 7/10/2022.  Today she reports that she is doing better, no complaint currently except throat pain over the last few hours. Currently patient is being treated with cefepime 2000 mg IV q12h for possible HAP. Today patient mentions that she had one episode of black diarrhea last night and hemoglobin is dropping. Days since admission: 10     Consults:   Pulmonology   Neurology   Cardiology see PRN     Assessment:  1. Acute encephalopathy-Resolved       Altered mental status on admission       Intubated in the ICU due to AMS       Extubated 4 days ago       Currently oriented to person time and place           Plan       Continue seroquel       Hold home depakote due to its association with encephalopathy     2. Acute hypoxic hypercapnic respiratory failure likely 2/2 to mod R     pleural  effusion, possible HAP vs acute decompensated HFpEF  exacerbation   (EF 70-75%)- Resolved         Patient was seen today and is improving         Pt able is orientated to time and place with good concentration         Chest tube still in place for now         Most recent CXR reveals mild subsegmental atelectasis in the LLL         Currently on NC 2L          Plan       Coordinate with pulmonology to remove chest tube          Continue to monitor respiratory status         3. Pulmonary hypertension II 2/2 acute decompensated diastolic heart failure in the setting of SERENA        Hx of heart failure with preserved EF with last echo done 07/07/2022            Plan       Fluid restriction       Continue metoprolol     4. Moderate Oral Dysphagia         Plan      Mince and moist consistency solids     5.  Moderate R pleural effusion      Most recent CXR 07/13/2022      Mild subsegmental atelectasis in the LLL           Plan       Continue cefepime 2000mg IV q12h       Monitor respiratory status     6 Permanent A-fib      Hx of atrial fibrillation        Plan       Continue with metoprolol     Resume Eliquis tomorrow for anticoagulation- H/H stable and no signs of bleeding      7. Anemia with concerns for pancytopenia complicated by severe thrombocytopenia-Resolved       Platelet count 883 trending up from 60 yesterday       H/H this afternoon        PPI        Holding all anticoagulation due to dark stool concerns        FOBT ordered        Monitor for signs of GI bleed        Famotidine qd to BID           Plan       Monitor CBC daily      8. Hx of Parkinson's disease      Sinemet resumed    Problems resolved during this admission:   Acute encepalopathy multifactorial 2/2 to septic shock, digoxin toxicity, uremia, resolved   Shock, likely septic, HAP, digoxin toxicity   HAGMA 2/2 to lactic acidosis (hypoperfusion, septic shock), uremia 2/2 SHANTANU  Elevated troponin   SHANTANU stage III on CKD   Metabolic alkalosis likely contraction alkalosis           PT/OT: AMPAC   DVT ppx: Held   GI ppx: Protonix       Next of Kin/ POA:  Daughter     Code Status:   Full code     Disposition:Continue current care       Deborah Castellano MD, PGY-1  Attending physician: Dr. Steve Bueno MD, FACP        NOTE: This report was transcribed using voice recognition software. Every effort was made to ensure accuracy; however, inadvertent computerized transcription errors may be present. Senior Resident Addendum:  I have seen and examined the patient with the intern. I have discussed the case, including pertinent history and exam findings with the intern. I agree with the assessment, plan and orders as documented above with the following additions:    Assessment:  Acute hypoxic hypercapnic respiratory failure 2/2 mod R pleural effusion, possible HAP vs acute decompensated  HFpEF exacerbation (EF 70-75%)- IMPROVING on 2L O2   Pulmonary hypertension likely Type II 2/2 acute decompensated diastolic heart failure, in the setting of Hx SERENA- STABLE  Moderate R pleural effusion, likely 2/2 HAP (Klebsiella pneumoniae) vs acute decompensated heart failure.  Chest tube insitu, 300 cc in 24 hrs   HAP (Klebsiella pneumoniae on resp culture, light growth). On cefepime to complete 10 days of antibiotics   Permanent atrial fibrillation, s/p DCCV (April and May 2022), now rate controlled. Acute on chronic macrocytic anemia, likely GI mucosal bleeding, blessing on 7/14,    Prolonged INR- now downtrending   Low grade fever, multifactorial, ? meds (Precedex?), CLABSI?, no new leukocytosis, or hemodynamic instability   . Hx of asthma   Hx of SERENA. Not using CPAP at home  . Hx of HTN. -160`s  . Hx of CAD  . Hx of Type 2 DM with neuropathy. On Lantus 10 and Novolog SS at home  . Hx of depression/anxiety. Hx of restless leg syndrome  . Hx of Parkinson's disease. On Sinemet and ropinirole at home, which was discontinued upon discharge at [address]:  Acute encephalopathy, multifactorial 2/2 septic shock, digoxin toxicity, uremia,resolved  Shock, likely septic (HAP), digoxin toxicity   Bradycardia, hypotension 2/2 Digoxin toxicity in the setting of SHANTANU. Digoxin level 3.7>>2   HAGMA 2/2 lactic acidosis (hypoperfusion, septic shock?),  uremia 2/2 SHANTANU, improving. Lactic acid down to 1.7   Hyperkalemia 2/2 SHANTANU, resolved. Elevated pro-BNP, multifactorial, likely acute decompensated HFpEF, septic shock. Elevated troponin likely 2/2 demand ischemia  Hypernatremia   SHANTANU Stage III on CKD multifactorial, pre-renal (hemodynamically mediated, DHN 2/2 diuretic use, poor oral intake); vs ATN 2/2 septic shock.     Metabolic alkalosis likely contraction alkalosis 2/2 diuresis, poor oral intake- improving  Pancytopenia likely 2/2 BM suppression 2/2 chronic illness, meds (Zosyn)-  Thrombocytopenia,likely 2/2 #11, less likely HIT (not exposed to heparin products), DIC, hemolysis ruled out (work-up negative)        Plan:  While septic shock is resolving and patient is to complete 10 days of antibiotics, she is now complaining of nausea, vomiting, 1 episode of melena with CBC showing slowly dropping hemoglobin over

## 2022-07-15 NOTE — PROGRESS NOTES
[facility]  Internal Medicine Residency / [address]    Attending Physician Statement  I have discussed the case, including pertinent history and exam findings with the resident and the team.  I have seen and examined the patient and the key elements of the encounter have been performed by me. I agree with the assessment, plan and orders as documented by the resident.       Case Discussed During AM Rounds   No overnight events    Labs remain stable    Glucose stable this am    H/H remains low- additional values continue to be stable    Limited emesis    Check stool occult- reported concern for dark stool    H/H this afternoon    PPI continued     Chest tube remains in place     Acute Encephalopathy- resolved    Continue seroquel   Hold home depakote for now   Holding her digoxin at this time as concern for potential dig toxicity on admission     Right Pleural Effusion with catheter in place for drainage   CXR reviewed   Pulmonary following    Chest tube remains in place- still draining   Continue to monitor for needs     Moderate Oropharyngeal dysphagia   Speech therapy assessment appreciated    Modified meal plan noted- dysphagia protocol: Minced and moist consistency solids (IDDSI level 5) with  thin liquids NO STRAW   Currently tolerating diet     Septic Shock likely secondary to Pneumonia- resolved    Hemodynamically stable   Continue atbs    Cultures reviewed- on cefepime due to adjustment of cultures reviewed   Complete 7 days of atbs for cultures noted     Anemia with previous concerns for pancytopenia    H/H this afternoon   PPI   Holding all anticoagulation as current concerns for potential dark stool   Check fecal occult   Monitor for any signs of GI bleed- if concerns- consult Surgery    Cannot resume anticoagulation or anti-platelet if concerns for active bleeding     Hypernatremia- resolved     HTN- adjustments note   Continue current management     Acute Hypoxic Respiratory Failure s/p extubation- stable    Continue to monitor respiratory function    Respiratory status currently stable    -8.6 L since admission   Maintain off any further diuresis at this time    Continue to monitor volume status     Atrial Fibrillation- HR controlled    Holding anticoagulation as noted above    No signs currently of bleeding    Continue to hold digoxin at this time     Hx of Parkinson's Disease on presentation    Sinemet has been resumed and follow for any concerns    Lactic Acidosis- resolved     Disposition- continue current management     Remainder of medical problems as per resident note.       Fartun Najera MD, [facility]   Internal Medicine Residency Faculty

## 2022-07-16 LAB
ADENOVIRUS BY PCR: NOT DETECTED
ALBUMIN SERPL-MCNC: 3.2 G/DL (ref 3.5–5.2)
ALP BLD-CCNC: 71 U/L (ref 35–104)
ALT SERPL-CCNC: <5 U/L (ref 0–32)
ANION GAP SERPL CALCULATED.3IONS-SCNC: 8 MMOL/L (ref 7–16)
AST SERPL-CCNC: 11 U/L (ref 0–31)
BASOPHILS ABSOLUTE: 0.01 E9/L (ref 0–0.2)
BASOPHILS RELATIVE PERCENT: 0.2 % (ref 0–2)
BILIRUB SERPL-MCNC: 0.8 MG/DL (ref 0–1.2)
BORDETELLA PARAPERTUSSIS BY PCR: NOT DETECTED
BORDETELLA PERTUSSIS BY PCR: NOT DETECTED
BUN BLDV-MCNC: 23 MG/DL (ref 6–23)
CALCIUM SERPL-MCNC: 8.2 MG/DL (ref 8.6–10.2)
CHLAMYDOPHILIA PNEUMONIAE BY PCR: NOT DETECTED
CHLORIDE BLD-SCNC: 103 MMOL/L (ref 98–107)
CO2: 30 MMOL/L (ref 22–29)
CORONAVIRUS 229E BY PCR: NOT DETECTED
CORONAVIRUS HKU1 BY PCR: NOT DETECTED
CORONAVIRUS NL63 BY PCR: NOT DETECTED
CORONAVIRUS OC43 BY PCR: NOT DETECTED
CREAT SERPL-MCNC: 1.1 MG/DL (ref 0.5–1)
EOSINOPHILS ABSOLUTE: 0.07 E9/L (ref 0.05–0.5)
EOSINOPHILS RELATIVE PERCENT: 1.4 % (ref 0–6)
GFR AFRICAN AMERICAN: 59
GFR NON-AFRICAN AMERICAN: 49 ML/MIN/1.73
GLUCOSE BLD-MCNC: 133 MG/DL (ref 74–99)
HCT VFR BLD CALC: 24.3 % (ref 34–48)
HCT VFR BLD CALC: 24.5 % (ref 34–48)
HEMOGLOBIN: 7.1 G/DL (ref 11.5–15.5)
HEMOGLOBIN: 7.1 G/DL (ref 11.5–15.5)
HUMAN METAPNEUMOVIRUS BY PCR: NOT DETECTED
HUMAN RHINOVIRUS/ENTEROVIRUS BY PCR: NOT DETECTED
IMMATURE GRANULOCYTES #: 0.2 E9/L
IMMATURE GRANULOCYTES %: 3.9 % (ref 0–5)
INFLUENZA A BY PCR: NOT DETECTED
INFLUENZA B BY PCR: NOT DETECTED
LYMPHOCYTES ABSOLUTE: 0.91 E9/L (ref 1.5–4)
LYMPHOCYTES RELATIVE PERCENT: 17.6 % (ref 20–42)
MAGNESIUM: 1.8 MG/DL (ref 1.6–2.6)
MCH RBC QN AUTO: 26.5 PG (ref 26–35)
MCHC RBC AUTO-ENTMCNC: 29 % (ref 32–34.5)
MCV RBC AUTO: 91.4 FL (ref 80–99.9)
METER GLUCOSE: 137 MG/DL (ref 74–99)
METER GLUCOSE: 142 MG/DL (ref 74–99)
METER GLUCOSE: 166 MG/DL (ref 74–99)
MONOCYTES ABSOLUTE: 0.57 E9/L (ref 0.1–0.95)
MONOCYTES RELATIVE PERCENT: 11 % (ref 2–12)
MYCOPLASMA PNEUMONIAE BY PCR: NOT DETECTED
NEUTROPHILS ABSOLUTE: 3.41 E9/L (ref 1.8–7.3)
NEUTROPHILS RELATIVE PERCENT: 65.9 % (ref 43–80)
PARAINFLUENZA VIRUS 1 BY PCR: NOT DETECTED
PARAINFLUENZA VIRUS 2 BY PCR: NOT DETECTED
PARAINFLUENZA VIRUS 3 BY PCR: NOT DETECTED
PARAINFLUENZA VIRUS 4 BY PCR: NOT DETECTED
PDW BLD-RTO: 15.8 FL (ref 11.5–15)
PHOSPHORUS: 2.9 MG/DL (ref 2.5–4.5)
PLATELET # BLD: 189 E9/L (ref 130–450)
PMV BLD AUTO: 11.3 FL (ref 7–12)
POTASSIUM SERPL-SCNC: 3.5 MMOL/L (ref 3.5–5)
RBC # BLD: 2.68 E12/L (ref 3.5–5.5)
RESPIRATORY SYNCYTIAL VIRUS BY PCR: NOT DETECTED
SARS-COV-2, PCR: NOT DETECTED
SODIUM BLD-SCNC: 141 MMOL/L (ref 132–146)
TOTAL PROTEIN: 5.4 G/DL (ref 6.4–8.3)
WBC # BLD: 5.2 E9/L (ref 4.5–11.5)

## 2022-07-16 PROCEDURE — 36415 COLL VENOUS BLD VENIPUNCTURE: CPT

## 2022-07-16 PROCEDURE — 2580000003 HC RX 258: Performed by: INTERNAL MEDICINE

## 2022-07-16 PROCEDURE — 6370000000 HC RX 637 (ALT 250 FOR IP)

## 2022-07-16 PROCEDURE — 85018 HEMOGLOBIN: CPT

## 2022-07-16 PROCEDURE — 6370000000 HC RX 637 (ALT 250 FOR IP): Performed by: INTERNAL MEDICINE

## 2022-07-16 PROCEDURE — 6360000002 HC RX W HCPCS: Performed by: INTERNAL MEDICINE

## 2022-07-16 PROCEDURE — S5553 INSULIN LONG ACTING 5 U: HCPCS | Performed by: INTERNAL MEDICINE

## 2022-07-16 PROCEDURE — 6360000002 HC RX W HCPCS: Performed by: STUDENT IN AN ORGANIZED HEALTH CARE EDUCATION/TRAINING PROGRAM

## 2022-07-16 PROCEDURE — 6360000002 HC RX W HCPCS

## 2022-07-16 PROCEDURE — 82962 GLUCOSE BLOOD TEST: CPT

## 2022-07-16 PROCEDURE — 2700000000 HC OXYGEN THERAPY PER DAY

## 2022-07-16 PROCEDURE — 83735 ASSAY OF MAGNESIUM: CPT

## 2022-07-16 PROCEDURE — 99232 SBSQ HOSP IP/OBS MODERATE 35: CPT | Performed by: INTERNAL MEDICINE

## 2022-07-16 PROCEDURE — 80053 COMPREHEN METABOLIC PANEL: CPT

## 2022-07-16 PROCEDURE — C9113 INJ PANTOPRAZOLE SODIUM, VIA: HCPCS | Performed by: STUDENT IN AN ORGANIZED HEALTH CARE EDUCATION/TRAINING PROGRAM

## 2022-07-16 PROCEDURE — A4216 STERILE WATER/SALINE, 10 ML: HCPCS | Performed by: STUDENT IN AN ORGANIZED HEALTH CARE EDUCATION/TRAINING PROGRAM

## 2022-07-16 PROCEDURE — 85025 COMPLETE CBC W/AUTO DIFF WBC: CPT

## 2022-07-16 PROCEDURE — 0202U NFCT DS 22 TRGT SARS-COV-2: CPT

## 2022-07-16 PROCEDURE — 84100 ASSAY OF PHOSPHORUS: CPT

## 2022-07-16 PROCEDURE — 6370000000 HC RX 637 (ALT 250 FOR IP): Performed by: STUDENT IN AN ORGANIZED HEALTH CARE EDUCATION/TRAINING PROGRAM

## 2022-07-16 PROCEDURE — 94640 AIRWAY INHALATION TREATMENT: CPT

## 2022-07-16 PROCEDURE — 94660 CPAP INITIATION&MGMT: CPT

## 2022-07-16 PROCEDURE — 2060000000 HC ICU INTERMEDIATE R&B

## 2022-07-16 PROCEDURE — 2580000003 HC RX 258: Performed by: STUDENT IN AN ORGANIZED HEALTH CARE EDUCATION/TRAINING PROGRAM

## 2022-07-16 PROCEDURE — 85014 HEMATOCRIT: CPT

## 2022-07-16 RX ORDER — MAGNESIUM SULFATE IN WATER 40 MG/ML
2000 INJECTION, SOLUTION INTRAVENOUS ONCE
Status: COMPLETED | OUTPATIENT
Start: 2022-07-16 | End: 2022-07-16

## 2022-07-16 RX ORDER — FUROSEMIDE 10 MG/ML
40 INJECTION INTRAMUSCULAR; INTRAVENOUS 2 TIMES DAILY
Status: DISCONTINUED | OUTPATIENT
Start: 2022-07-16 | End: 2022-07-18

## 2022-07-16 RX ORDER — POTASSIUM CHLORIDE 20 MEQ/1
40 TABLET, EXTENDED RELEASE ORAL ONCE
Status: COMPLETED | OUTPATIENT
Start: 2022-07-16 | End: 2022-07-16

## 2022-07-16 RX ORDER — ROPINIROLE 1 MG/1
1 TABLET, FILM COATED ORAL 3 TIMES DAILY
Status: DISCONTINUED | OUTPATIENT
Start: 2022-07-16 | End: 2022-07-27 | Stop reason: HOSPADM

## 2022-07-16 RX ADMIN — INSULIN LISPRO 1 UNITS: 100 INJECTION, SOLUTION INTRAVENOUS; SUBCUTANEOUS at 12:00

## 2022-07-16 RX ADMIN — CARBIDOPA AND LEVODOPA 2 TABLET: 25; 100 TABLET, EXTENDED RELEASE ORAL at 08:54

## 2022-07-16 RX ADMIN — INSULIN LISPRO 1 UNITS: 100 INJECTION, SOLUTION INTRAVENOUS; SUBCUTANEOUS at 20:02

## 2022-07-16 RX ADMIN — CARBIDOPA AND LEVODOPA 2 TABLET: 25; 100 TABLET, EXTENDED RELEASE ORAL at 20:16

## 2022-07-16 RX ADMIN — PANTOPRAZOLE SODIUM 40 MG: 40 INJECTION, POWDER, FOR SOLUTION INTRAVENOUS at 12:23

## 2022-07-16 RX ADMIN — IPRATROPIUM BROMIDE AND ALBUTEROL SULFATE 1 AMPULE: .5; 2.5 SOLUTION RESPIRATORY (INHALATION) at 08:43

## 2022-07-16 RX ADMIN — POTASSIUM BICARBONATE 40 MEQ: 782 TABLET, EFFERVESCENT ORAL at 12:22

## 2022-07-16 RX ADMIN — IPRATROPIUM BROMIDE AND ALBUTEROL SULFATE 1 AMPULE: .5; 2.5 SOLUTION RESPIRATORY (INHALATION) at 19:58

## 2022-07-16 RX ADMIN — POTASSIUM CHLORIDE 40 MEQ: 1500 TABLET, EXTENDED RELEASE ORAL at 08:55

## 2022-07-16 RX ADMIN — ATORVASTATIN CALCIUM 20 MG: 20 TABLET, FILM COATED ORAL at 08:55

## 2022-07-16 RX ADMIN — MAGNESIUM SULFATE HEPTAHYDRATE 2000 MG: 40 INJECTION, SOLUTION INTRAVENOUS at 09:07

## 2022-07-16 RX ADMIN — AMIODARONE HYDROCHLORIDE 200 MG: 200 TABLET ORAL at 08:54

## 2022-07-16 RX ADMIN — IPRATROPIUM BROMIDE AND ALBUTEROL SULFATE 1 AMPULE: .5; 2.5 SOLUTION RESPIRATORY (INHALATION) at 15:57

## 2022-07-16 RX ADMIN — BUDESONIDE 500 MCG: 0.5 SUSPENSION RESPIRATORY (INHALATION) at 08:43

## 2022-07-16 RX ADMIN — METOPROLOL TARTRATE 50 MG: 50 TABLET, FILM COATED ORAL at 20:16

## 2022-07-16 RX ADMIN — Medication 5 MG: at 20:16

## 2022-07-16 RX ADMIN — BUDESONIDE 500 MCG: 0.5 SUSPENSION RESPIRATORY (INHALATION) at 19:58

## 2022-07-16 RX ADMIN — ROPINIROLE HYDROCHLORIDE 1 MG: 1 TABLET, FILM COATED ORAL at 14:26

## 2022-07-16 RX ADMIN — SODIUM CHLORIDE, PRESERVATIVE FREE 10 ML: 5 INJECTION INTRAVENOUS at 20:17

## 2022-07-16 RX ADMIN — METOPROLOL TARTRATE 50 MG: 50 TABLET, FILM COATED ORAL at 08:57

## 2022-07-16 RX ADMIN — ROPINIROLE HYDROCHLORIDE 1 MG: 1 TABLET, FILM COATED ORAL at 20:16

## 2022-07-16 RX ADMIN — QUETIAPINE FUMARATE 25 MG: 25 TABLET ORAL at 08:55

## 2022-07-16 RX ADMIN — FUROSEMIDE 40 MG: 10 INJECTION, SOLUTION INTRAMUSCULAR; INTRAVENOUS at 17:43

## 2022-07-16 RX ADMIN — PANTOPRAZOLE SODIUM 40 MG: 40 INJECTION, POWDER, FOR SOLUTION INTRAVENOUS at 20:17

## 2022-07-16 RX ADMIN — QUETIAPINE FUMARATE 25 MG: 25 TABLET ORAL at 20:16

## 2022-07-16 RX ADMIN — SODIUM CHLORIDE, PRESERVATIVE FREE 10 ML: 5 INJECTION INTRAVENOUS at 08:55

## 2022-07-16 RX ADMIN — BENZOCAINE AND MENTHOL 1 LOZENGE: 15; 3.6 LOZENGE ORAL at 17:43

## 2022-07-16 RX ADMIN — IPRATROPIUM BROMIDE AND ALBUTEROL SULFATE 1 AMPULE: .5; 2.5 SOLUTION RESPIRATORY (INHALATION) at 12:29

## 2022-07-16 RX ADMIN — INSULIN GLARGINE-YFGN 5 UNITS: 100 INJECTION, SOLUTION SUBCUTANEOUS at 20:02

## 2022-07-16 RX ADMIN — CARBIDOPA AND LEVODOPA 2 TABLET: 25; 100 TABLET, EXTENDED RELEASE ORAL at 14:26

## 2022-07-16 RX ADMIN — CEFEPIME 2000 MG: 2 INJECTION, POWDER, FOR SOLUTION INTRAVENOUS at 02:51

## 2022-07-16 ASSESSMENT — PAIN DESCRIPTION - LOCATION: LOCATION: LEG

## 2022-07-16 ASSESSMENT — PAIN SCALES - GENERAL: PAINLEVEL_OUTOF10: 8

## 2022-07-16 NOTE — PROGRESS NOTES
[facility]  Internal Medicine Residency / [address]    Attending Physician Statement  I have discussed the case, including pertinent history and exam findings with the resident and the team.  I have seen and examined the patient and the key elements of the encounter have been performed by me. I agree with the assessment, plan and orders as documented by the resident.       Case Discussed During AM Rounds   No further N/V    RLS symptoms noted and significant- on home requip and has not been resumed yet inpatient   Continued concerns with low HgB   FOBT needed as not yet completed   Have not yet resumed eliquis and aspirin due to bleeding risk   Chest tube remains in place and reduced drainage     Acute Encephalopathy- resolved    Continue seroquel   Hold home depakote for now   Holding her digoxin at this time as concern for potential dig toxicity on admission     Right Pleural Effusion with catheter in place for drainage   CXR reviewed   Pulmonary following    Chest tube remains in place- still draining but reduced    Continue to monitor for needs- coordinate with Pulm      Moderate Oropharyngeal dysphagia   Speech therapy assessment appreciated    Modified meal plan noted- dysphagia protocol: Minced and moist consistency solids (IDDSI level 5) with  thin liquids NO STRAW   Currently tolerating diet     Septic Shock likely secondary to Pneumonia- resolved    Hemodynamically stable   Continue atbs    Cultures reviewed- on cefepime due to adjustment of cultures reviewed   Complete 7 days of atbs for cultures noted     Anemia with previous concerns for pancytopenia    PPI   Check fecal occult- not yet complete    Transfuse as necessary to maintain HgB above 7    Monitor for any signs of GI bleed- if concerns- consult Surgery    Cannot resume anticoagulation or anti-platelet if concerns for active bleeding     Restless Leg Syndrome   Resume home requip    Hypernatremia- resolved HTN- continue current management     Acute Hypoxic Respiratory Failure s/p extubation- stable    Continue to monitor respiratory function    Respiratory status currently stable    -8.5 L since admission   Given 1 x diuresis per Nephrology yesterday- respiratory status improved today     Atrial Fibrillation- HR controlled    Holding anticoagulation as noted above    No signs currently of bleeding    Continue to hold digoxin at this time     Hx of Parkinson's Disease on presentation    Sinemet has been resumed and follow for any concerns    Lactic Acidosis- resolved     DVT prophylaxis- SCDs- as unable to resume eliquis- discussed importance of continuing SCDs with patient during rounds. Disposition- continue current management     Remainder of medical problems as per resident note.       Fartun Najera MD, [address]   Internal Medicine Residency Faculty

## 2022-07-16 NOTE — PROGRESS NOTES
[facility] MD Jonathan Cotto MD Janace Milroy, MD .  Progress Note      Patient's Name: Indy Muñoz  1:37 PM  7/16/2022    Subjective  7/7 : seen in her room intubated mechanically ventilated fio2 40 . LE edema 1-2+ . On levophed . UO ok over last 24 hours     7/8 pt not in her room when coming to see her . She is in IR lab. Case d/w RN     7/9 seen in her room d/w ICU resident   Still on some pressors actually BP low when seeing her   Has CT in place with good drainage . Good UO in response to lasix via genao cath . Fio2 40 PEEP 8       7/10 seen in ICU intubated mechanically ventilated fio2 40 PEEP 8   1-2+ edema in UE and LEs   No pressors  On precedex drip     7/11 : seen in ICU extubated earlier today . BP stable on HFNC    7/12 seen in her room ,extubated , on o2/nc . Chest tube in place . Fevers today and precedex drip put on hold .good UO over last 24 hours     7/13 : seen in her room on o2/nc BP stable clinically doing better still with CT along with genao and fecal system    7/14 sitting in chair comfortable on o2/nc BP stable LE edema dependent . 7/15 ; weak on o2/nc . 7/16: In better spirits today. Denies dyspnea on nasal cannula. Receiving breathing treatment. Ongoing fatigue, generalized weakness and debilitation. Good appetite. Continued marked swelling. Asks about eating potato chips, discussed why this would be a bad idea. History of Present Ilness:      69-year old female with a past medical history of hypertension, A. fib, CAD, asthma, HFpEF, CKD, SERENA, hyperlipidemia, type II DM, anxiety/depression, Parkinson's disease, restless leg syndrome who presented in the ED for altered mental status. She recently had a right second toe amputation back last month at [facility].  She was discharged after 2 weeks.  According to the patient's family, she was doing okay until few hours prior to admission, patient was noted to be acting different yesterday morning. She was noted to be drowsy    Nephrology asked to see for SHANTANU   I did see pt in ICU she is intubated mechanically ventilated . She is on levophed . She has some LE edema. Vent setting stable . UO marginal .       Allergies:  Ciprofloxacin and Codeine    Current Medications:    rOPINIRole (REQUIP) tablet 1 mg, TID  pantoprazole (PROTONIX) 40 mg in sodium chloride (PF) 10 mL injection, Q12H  QUEtiapine (SEROQUEL) tablet 25 mg, BID  metoprolol tartrate (LOPRESSOR) tablet 50 mg, BID  melatonin disintegrating tablet 5 mg, Nightly  labetalol (NORMODYNE;TRANDATE) injection 10 mg, Q6H PRN  hydrALAZINE (APRESOLINE) injection 10 mg, Q6H PRN  amiodarone (CORDARONE) tablet 200 mg, Daily  insulin lispro (HUMALOG) injection vial 0-6 Units, 4x Daily AC & HS  carbidopa-levodopa (SINEMET CR)  MG per extended release tablet 2 tablet, TID  ondansetron (ZOFRAN) injection 4 mg, Q6H PRN  budesonide (PULMICORT) nebulizer suspension 500 mcg, BID  levalbuterol (XOPENEX) nebulization 0.63 mg, Q4H PRN  insulin glargine-yfgn (SEMGLEE-YFGN) injection vial 5 Units, Nightly  ipratropium-albuterol (DUONEB) nebulizer solution 1 ampule, Q4H WA  [Held by provider] aspirin chewable tablet 81 mg, Daily  polyethylene glycol (GLYCOLAX) packet 17 g, BID  [Held by provider] apixaban (ELIQUIS) tablet 5 mg, BID  atorvastatin (LIPITOR) tablet 20 mg, Daily  sodium chloride flush 0.9 % injection 5-40 mL, 2 times per day  0.9 % sodium chloride infusion, PRN  acetaminophen (TYLENOL) tablet 650 mg, Q6H PRN   Or  acetaminophen (TYLENOL) suppository 650 mg, Q6H PRN  glucose chewable tablet 16 g, PRN  dextrose bolus 10% 125 mL, PRN   Or  dextrose bolus 10% 250 mL, PRN  glucagon (rDNA) injection 1 mg, PRN  dextrose 5 % solution, PRN      Review of Systems:   Unable to obtain due to clinical conditon .      Physical exam:   Vital signs BP (!) 117/58   Pulse 83   Temp 98.5 °F (36.9 °C) (Oral)   Resp 16   Ht 5' (1.524 m) Wt 238 lb (108 kg)   SpO2 95%   BMI 46.48 kg/m²   Gen : moderate distress  Head : at , nc   Neck : supple , no thyromegaly noted . Eyes : EOMI , PERRLA   CV : tachycardiac 1+ edema in LE   Lungs: good flow heard b/l   Abd : soft , NT , BS + , No Organomegaly appreciated . Skin : soft, dry . Extremities: 2-3+ pitting edema lower back sacrum and thighs distal lower extremity  Neuro : CN  II-XII grossly intact , no focal neurologic deficit . Psych : cooperative .      Data:   Labs:  CBC with Differential:    Lab Results   Component Value Date/Time    WBC 5.2 07/16/2022 07:33 AM    RBC 2.68 07/16/2022 07:33 AM    HGB 7.1 07/16/2022 07:33 AM    HCT 24.5 07/16/2022 07:33 AM     07/16/2022 07:33 AM    MCV 91.4 07/16/2022 07:33 AM    MCH 26.5 07/16/2022 07:33 AM    MCHC 29.0 07/16/2022 07:33 AM    RDW 15.8 07/16/2022 07:33 AM    NRBC 0.0 03/15/2019 09:10 AM    SEGSPCT 74 08/20/2013 10:45 AM    METASPCT 0.9 07/14/2022 05:31 AM    LYMPHOPCT 17.6 07/16/2022 07:33 AM    MONOPCT 11.0 07/16/2022 07:33 AM    MYELOPCT 2.6 07/14/2022 05:31 AM    EOSPCT 1 06/16/2017 12:00 AM    BASOPCT 0.2 07/16/2022 07:33 AM    MONOSABS 0.57 07/16/2022 07:33 AM    LYMPHSABS 0.91 07/16/2022 07:33 AM    EOSABS 0.07 07/16/2022 07:33 AM    BASOSABS 0.01 07/16/2022 07:33 AM     CMP:    Lab Results   Component Value Date/Time     07/16/2022 05:19 AM    K 3.5 07/16/2022 05:19 AM    K 5.0 07/06/2022 02:41 AM     07/16/2022 05:19 AM    CO2 30 07/16/2022 05:19 AM    BUN 23 07/16/2022 05:19 AM    CREATININE 1.1 07/16/2022 05:19 AM    GFRAA 59 07/16/2022 05:19 AM    LABGLOM 49 07/16/2022 05:19 AM    GLUCOSE 133 07/16/2022 05:19 AM    PROT 5.4 07/16/2022 05:19 AM    LABALBU 3.2 07/16/2022 05:19 AM    CALCIUM 8.2 07/16/2022 05:19 AM    BILITOT 0.8 07/16/2022 05:19 AM    ALKPHOS 71 07/16/2022 05:19 AM    AST 11 07/16/2022 05:19 AM    ALT <5 07/16/2022 05:19 AM     Ionized Calcium:  No results found for: IONCA  Magnesium:    Lab Results Component Value Date/Time    MG 1.8 07/16/2022 05:19 AM     Phosphorus:    Lab Results   Component Value Date/Time    PHOS 2.9 07/16/2022 05:19 AM     U/A:    Lab Results   Component Value Date/Time    COLORU Yellow 07/05/2022 05:43 PM    PHUR 5.5 07/05/2022 05:43 PM    WBCUA 1-3 07/05/2022 05:43 PM    RBCUA NONE 07/05/2022 05:43 PM    RBCUA NONE 03/25/2013 09:00 PM    YEAST RARE 10/27/2015 07:18 PM    BACTERIA FEW 07/05/2022 05:43 PM    CLARITYU Clear 07/05/2022 05:43 PM    SPECGRAV 1.025 07/05/2022 05:43 PM    LEUKOCYTESUR Negative 07/05/2022 05:43 PM    UROBILINOGEN 0.2 07/05/2022 05:43 PM    BILIRUBINUR Negative 07/05/2022 05:43 PM    BLOODU Negative 07/05/2022 05:43 PM    GLUCOSEU Negative 07/05/2022 05:43 PM     Microalbumen/Creatinine ratio:  No components found for: RUCREAT  Iron Saturation:  No components found for: PERCENTFE  TIBC:    Lab Results   Component Value Date/Time    TIBC 152 07/09/2022 08:01 AM     FERRITIN:    Lab Results   Component Value Date/Time    FERRITIN 243 07/09/2022 08:01 AM        Imaging:  XR CHEST PORTABLE   Final Result   No sizable pleural effusion. XR CHEST PORTABLE   Final Result   Mild subsegmental atelectasis in the left lower lobe. XR CHEST PORTABLE   Final Result   Grossly stable pulmonary opacities in the lower left lung, which may   represent atelectasis or pneumonia. XR CHEST PORTABLE   Final Result   1. Patchy right perihilar and left lower lobe airspace disease   2. Status post removal of the endotracheal tube and NG tube      3. There is no pneumothorax. XR CHEST PORTABLE   Final Result   Lines and tubes are stable with no pneumothorax on the right. Some   improvement seen in the region of left lung base in the interval.         XR CHEST PORTABLE   Final Result   The evaluation is limited due to low lung volumes. No interval change compared to prior exam.      Lines and tubes are in unchanged position.          XR ABDOMEN FOR NG/OG/NE TUBE PLACEMENT   Final Result   Catheter is in the stomach. XR CHEST PORTABLE   Final Result   Catheter placed with significant right lung aeration improvement and no   pneumothorax         MRI BRAIN WO CONTRAST   Final Result   1. No acute intracranial abnormality. 2. No gross epileptogenic lesion is identified. 3. Bilateral mastoid effusions, which may be due to eustachian tube   dysfunction or otomastoiditis. RECOMMENDATIONS:   Unavailable         CT GUIDED CHEST TUBE   Final Result   Successful uncomplicated  right chest tube placement      Recommendations:   1. Follow-up tube check in 2 weeks   2. Flush tube daily with 5 to 10 mL of sterile saline            XR CHEST PORTABLE   Final Result   1. There is no interval change in the bilateral perihilar and lower lobe   airspace disease more prominent within the right lung. 2. Moderate size right pleural effusion. 3. There is no pneumothorax. US RETROPERITONEAL COMPLETE   Final Result   1. Marked bilateral renal cortical thinning. Echogenic renal parenchyma. No hydronephrosis. 2.  A Diaz catheter appears to be decompressing the bladder. XR CHEST PORTABLE   Final Result   Increasing infiltrate throughout the right lung persistent left basilar   alveolar infiltrate is well. XR CHEST PORTABLE   Final Result   Adequately positioned right internal jugular central venous line. Otherwise,   no significant change compared to prior exam glued in lines and tubes. US DUP LOWER EXTREMITIES BILATERAL VENOUS   Final Result   Within the visualized vessels there is no evidence for deep venous   thrombosis               XR CHEST PORTABLE   Final Result   1. No significant change. Cardiomegaly with right pleural effusion. 2.  Support tubes remain in appropriate position.          CT HEAD WO CONTRAST   Final Result   No acute intracranial abnormality or significant change in the overall   appearance of Encephalopathy metabolic multifactorial : better     3. Digoxin toxicity s/p digbind    4. Septic shock 2/2 PNA : resolved    5. Right Pleural Effusion with catheter in place for drainage    6.   Edema, marked, multifactorial    SHANTANU resolving     Recommendations  Lasix 40 mg IV twice daily  Continue supportive care  Follow labs, UO  Low-sodium diet      Electronically signed by Florin Sánchez MD on 7/16/2022

## 2022-07-16 NOTE — PLAN OF CARE
Patient not tolerating KCl tablet, will switch to Effer-K for potassium replacement. Please make changes to reflect on MAR.

## 2022-07-16 NOTE — PLAN OF CARE
Problem: Chronic Conditions and Co-morbidities  Goal: Patient's chronic conditions and co-morbidity symptoms are monitored and maintained or improved  Outcome: Progressing  Flowsheets (Taken 7/16/2022 0834)  Care Plan - Patient's Chronic Conditions and Co-Morbidity Symptoms are Monitored and Maintained or Improved: Monitor and assess patient's chronic conditions and comorbid symptoms for stability, deterioration, or improvement     Problem: Discharge Planning  Goal: Discharge to home or other facility with appropriate resources  Outcome: Progressing  Flowsheets (Taken 7/16/2022 0834)  Discharge to home or other facility with appropriate resources: Identify barriers to discharge with patient and caregiver     Problem: Pain  Goal: Verbalizes/displays adequate comfort level or baseline comfort level  Outcome: Progressing     Problem: Skin/Tissue Integrity  Goal: Absence of new skin breakdown  Description: 1. Monitor for areas of redness and/or skin breakdown  2. Assess vascular access sites hourly  3. Every 4-6 hours minimum:  Change oxygen saturation probe site  4. Every 4-6 hours:  If on nasal continuous positive airway pressure, respiratory therapy assess nares and determine need for appliance change or resting period.   Outcome: Progressing

## 2022-07-16 NOTE — PROGRESS NOTES
[facility]  Internal Medicine Residency Program  Progress Note - House Team     Patient:  Beata Mckenzie 66 y.o. female MRN: [id number]     Date of Service: 7/16/2022     CC: Altered mental status secondary to acute hypoxic hypercapnic respiratory failure likely 2/2 to R pleural effusion, possible HAP vs acute decompensated HF. Days since admission: 12    Subjective     Overnight events: Overnight event is significant for concerns of restless leg syndrome for which she is already on ropinirole. Hgb sable 7.4. No other other significant overnight event. This morning patient was seen and examined at bedside and reports no new symptoms except for exacerbation of her restless leg syndrome. Otherwise she reports no headache, no chest pain, no palpitations, no dizziness, no abdominal pain, no chills, no fever, no cough. She says that her restless leg syndrome improves when she raises the leg. Objective     Physical Exam:  Vitals: /62   Pulse 76   Temp 98.1 °F (36.7 °C) (Oral)   Resp 16   Ht 5' (1.524 m)   Wt 238 lb (108 kg)   SpO2 95%   BMI 46.48 kg/m²     I & O - 24hr:   Intake/Output Summary (Last 24 hours) at 7/16/2022 0811  Last data filed at 7/15/2022 1800  Gross per 24 hour   Intake 360 ml   Output 125 ml   Net 235 ml      General Appearance: alert, appears stated age, and cooperative  HEENT:  Head: Normocephalic, no lesions, without obvious abnormality. Neck: no adenopathy, no carotid bruit, no JVD, supple, symmetrical, trachea midline, and thyroid not enlarged, symmetric, no tenderness/mass/nodules  Lung: clear to auscultation bilaterally, chest tube in place   Heart: regular rate and rhythm, S1, S2 normal, no murmur, click, rub or gallop  Abdomen: soft, non-tender; bowel sounds normal; no masses,  no organomegaly  Extremities:  1+ peripheral edema   Musculokeletal: No joint swelling, no muscle tenderness. ROM normal in all joints of extremities.    Neurologic: Mental status: Alert, oriented, thought content appropriate    Pertinent Information & Imaging Studies, Consults     CBC with Differential:    Lab Results   Component Value Date/Time    WBC 5.2 07/16/2022 07:33 AM    RBC 2.68 07/16/2022 07:33 AM    HGB 7.1 07/16/2022 07:33 AM    HCT 24.5 07/16/2022 07:33 AM     07/16/2022 07:33 AM    MCV 91.4 07/16/2022 07:33 AM    MCH 26.5 07/16/2022 07:33 AM    MCHC 29.0 07/16/2022 07:33 AM    RDW 15.8 07/16/2022 07:33 AM    NRBC 0.0 03/15/2019 09:10 AM    SEGSPCT 74 08/20/2013 10:45 AM    METASPCT 0.9 07/14/2022 05:31 AM    LYMPHOPCT 17.6 07/16/2022 07:33 AM    MONOPCT 11.0 07/16/2022 07:33 AM    MYELOPCT 2.6 07/14/2022 05:31 AM    EOSPCT 1 06/16/2017 12:00 AM    BASOPCT 0.2 07/16/2022 07:33 AM    MONOSABS 0.57 07/16/2022 07:33 AM    LYMPHSABS 0.91 07/16/2022 07:33 AM    EOSABS 0.07 07/16/2022 07:33 AM    BASOSABS 0.01 07/16/2022 07:33 AM     BMP:    Lab Results   Component Value Date/Time     07/16/2022 05:19 AM    K 3.5 07/16/2022 05:19 AM    K 5.0 07/06/2022 02:41 AM     07/16/2022 05:19 AM    CO2 30 07/16/2022 05:19 AM    BUN 23 07/16/2022 05:19 AM    LABALBU 3.2 07/16/2022 05:19 AM    CREATININE 1.1 07/16/2022 05:19 AM    CALCIUM 8.2 07/16/2022 05:19 AM    GFRAA 59 07/16/2022 05:19 AM    LABGLOM 49 07/16/2022 05:19 AM    GLUCOSE 133 07/16/2022 05:19 AM     BUN/Creatinine:    Lab Results   Component Value Date/Time    BUN 23 07/16/2022 05:19 AM    CREATININE 1.1 07/16/2022 05:19 AM     Magnesium:    Lab Results   Component Value Date/Time    MG 1.8 07/16/2022 05:19 AM     Phosphorus:    Lab Results   Component Value Date/Time    PHOS 2.9 07/16/2022 05:19 AM     U/A:    Lab Results   Component Value Date/Time    COLORU Yellow 07/05/2022 05:43 PM    PROTEINU 30 07/05/2022 05:43 PM    PHUR 5.5 07/05/2022 05:43 PM    WBCUA 1-3 07/05/2022 05:43 PM    RBCUA NONE 07/05/2022 05:43 PM    RBCUA NONE 03/25/2013 09:00 PM    YEAST RARE 10/27/2015 07:18 PM    BACTERIA FEW 07/05/2022 05:43 PM    CLARITYU Clear 07/05/2022 05:43 PM    SPECGRAV 1.025 07/05/2022 05:43 PM    LEUKOCYTESUR Negative 07/05/2022 05:43 PM    UROBILINOGEN 0.2 07/05/2022 05:43 PM    BILIRUBINUR Negative 07/05/2022 05:43 PM    BLOODU Negative 07/05/2022 05:43 PM    GLUCOSEU Negative 07/05/2022 05:43 PM     TSH:    Lab Results   Component Value Date/Time    TSH 4.580 07/06/2022 02:41 AM     VITAMIN B12: No components found for: B12  FOLATE:    Lab Results   Component Value Date/Time    FOLATE 5.8 07/06/2022 03:08 AM       IMAGING:   Imaging Studies:    XR CHEST PORTABLE    Result Date: 7/15/2022  No sizable pleural effusion. XR CHEST PORTABLE    Result Date: 7/14/2022  Mild subsegmental atelectasis in the left lower lobe. XR CHEST PORTABLE    Result Date: 7/12/2022  Grossly stable pulmonary opacities in the lower left lung, which may represent atelectasis or pneumonia. XR CHEST PORTABLE    Result Date: 7/11/2022  1. Patchy right perihilar and left lower lobe airspace disease 2. Status post removal of the endotracheal tube and NG tube 3. There is no pneumothorax. XR CHEST PORTABLE    Result Date: 7/10/2022  Lines and tubes are stable with no pneumothorax on the right.   Some improvement seen in the region of left lung base in the interval.            PROCEDURES: Waiting for chest tube removal from pulmonary         Notable Cultures:      Blood cultures   Blood Culture, Routine   Date Value Ref Range Status   07/05/2022 5 Days no growth  Final     Respiratory cultures No results found for: RESPCULTURE   Gram Stain Result   Date Value Ref Range Status   07/08/2022 Refer to ordered Gram stain for results  Final     Urine   Creatinine Ur POCT   Date Value Ref Range Status   06/17/2019 50 mg/dl  Final     Urine Culture, Routine   Date Value Ref Range Status   07/05/2022 Growth not present  Final     Legionella No results found for: LABLEGI  C Diff PCR No results found for: CDIFPCR  Wound culture/abscess: No results for input(s): WNDABS in the last 72 hours. Tip culture:No results for input(s): CXCATHTIP in the last 72 hours. Antibiotic  Days  Day started   Zosyn  7/5 dcd 7/11    Cefepime  5 days    7/11                  OXYGENATION: 2L NC     DIET: Mince and Moist diet due to dysphagia         Resident's Assessment and Plan     Galindo Schumacher is a 66 y.o. female with  has a past medical history of A-fib ([address] Acute on chronic congestive heart failure ([address] Anxiety, Asthma, CAD (coronary artery disease), Cancer ([address] Chronic kidney disease, Depression, Diabetes mellitus ([address] H/O mammogram, Hx MRSA infection, Hyperlipidemia, Hypertension, Lateral epicondylitis, SERENA on CPAP, Parkinson's disease ([address] and Tubal ligation status. came here with CC   Chief Complaint   Patient presents with    Altered Mental Status     per ems family reports ams x 45 minutes, recent psych med changes and right toe amputation        SUMMARY [facility]: Briefly, Ms. Matias Lopez is a 67 y/o F admitted for acute hypoxic hypercapnic respiratory failure and altered mental status likely secondary to pleural effusion, possible HAP and acute decompensated HFpEF exacerbation (EF 70-75%%), and septic shock likely secondary to pneumonia, hypernatremia, and lactic acidosis. She was intubated and placed in the ICU, however, was extubated after improvement on 7/10/2022. Today she reports that she is doing better, no complaint currently except throat pain over the last few hours. Currently patient is being treated with cefepime 2000 mg IV q12h for possible HAP. Patient mentioned that she had one episode of black diarrhea yesterday night and hemoglobin is dropping, FOBT is positive. She has not had any more bowel movement since the last one that showed melena.      Days since admission: 11    Consults:   Pulmonology  Neurology   Cardiology see PRN      Assessment:  Acute encephalopathy-Resolved      Altered mental status on admission       Intubated in the ICU due to AMS       Extubated 4 days ago       Currently oriented to person time and place           Plan       Continue seroquel       Hold home depakote due to its association with encephalopathy     2. Acute hypoxic hypercapnic respiratory failure likely 2/2 to mod R     pleural effusion, possible HAP vs acute decompensated HFpEF  exacerbation   (EF 70-75%)- Resolved       Patient was seen today and is improving         Pt able is orientated to time and place with good concentration         Chest tube still in place for now         Most recent CXR reveals mild subsegmental atelectasis in the LLL         Currently on NC 2L          Plan       Coordinate with pulmonology to remove chest tube          Continue to monitor respiratory status         3. Pulmonary hypertension II 2/2 acute decompensated diastolic heart failure in the setting of SERENA        Hx of heart failure with preserved EF with last echo done 07/07/2022           Plan       Fluid restriction       Continue metoprolol     4. Moderate Oral Dysphagia         Plan      Mince and moist consistency solids     5. Moderate R pleural effusion- Resolved       Most recent CXR 07/15/2022 No sizable pleural effusion compared to       Most recent CXR 07/13/2022      Mild subsegmental atelectasis in the LLL           Plan       Continue cefepime 2000mg IV q12h       Monitor respiratory status    6. Permanent A-fib      Hx of atrial fibrillation        Plan       Continue with metoprolol      Eliquis remains on hold due to concerning of worsening HgB      7. Anemia with concerns for pancytopenia complicated by severe thrombocytopenia-Resolved       Platelet count 896 trending up from 60 yesterday             Plan       Monitor CBC daily      8. Hx of Parkinson's disease     Sinemet resumed    9.  Melena likely 2/2 gastritis vs stress ulcer post ICU       Pt referred to one episode of melena on 07/15/2022        She also reported nausea        FOBT performed on 07/16/2022 is positive           Plan         Consult general surgery for probable scope         Increase PPI dose to BID         Transfuse if Hb is <7         H & H q24h      Problems resolved during this admission:   Acute encepalopathy multifactorial 2/2 to septic shock, digoxin toxicity, uremia, resolved  Shock, likely septic, HAP, digoxin toxicity  HAGMA 2/2 to lactic acidosis (hypoperfusion, septic shock), uremia 2/2 SHANTANU  Elevated troponin  SHANTANU stage III on CKD  Metabolic alkalosis likely contraction alkalosis          PT/OT: AMPAC   DVT ppx: Held   GI ppx: Protonix       Next of Kin/ POA:  Daughter     Code Status:   Full code    Disposition: Continue current care      Yany Clark MD, PGY-1  Attending physician: Dr. Paige Gan. Von Roque MD, FACP        NOTE: This report was transcribed using voice recognition software. Every effort was made to ensure accuracy; however, inadvertent computerized transcription errors may be present. Senior Resident Addendum:  I have seen and examined the patient with the intern. I have discussed the case, including pertinent history and exam findings with the intern. I agree with the assessment, plan and orders as documented above with the following additions:      Patient seen and examined at bedside this morning in no acute distress. Still on 2L via NC. She has been complaining that her restless leg syndrome has been bothering her overnight. She has been off of her home med ropinirole since admission. She continues to have dark stools, had 2 episodes overnight. Otherwise no other active complaints. On exam, patient had improved breath sounds. Rectal exam was done by this resident, no hemorrhoids or masses noted. She is still FOBT+. Chest tube still in place with less output in the last 24 hours (125cc). No sizeable pleural effusion noted on CXR yesterday.     Assessment:  Acute hypoxic hypercapnic respiratory failure 2/2 mod R pleural effusion, possible HAP vs acute decompensated  HFpEF exacerbation (EF 70-75%)- IMPROVING on 2L O2  Pulmonary hypertension likely Type II 2/2 acute decompensated diastolic heart failure, in the setting of Hx SERENA- STABLE  Moderate R pleural effusion, likely 2/2 HAP (Klebsiella pneumoniae) vs acute decompensated heart failure. Chest tube insitu, 125 cc in 24 hrs  HAP (Klebsiella pneumoniae on resp culture, light growth). Completed 10 days of antibiotics  Permanent atrial fibrillation, s/p DCCV (April and May 2022), now rate controlled. Acute on chronic macrocytic anemia, likely GI mucosal bleeding, melanotic stools and FOBT+  Prolonged INR- now downtrending  Hx of asthma  Hx of SERENA. Not using CPAP at home  Hx of HTN. Hx of CAD  Hx of Type 2 DM with neuropathy. On Lantus 10 and Novolog SS at home  Hx of depression/anxiety. On seroquel at home  Hx of restless leg syndrome. On ropinirole at homee  Hx of Parkinson's disease. On Sinemet at home, which was discontinued upon discharge at [address] [place] [address]:  Acute encephalopathy, multifactorial 2/2 septic shock, digoxin toxicity, uremia,resolved  Shock, likely septic (HAP), digoxin toxicity  Bradycardia, hypotension 2/2 Digoxin toxicity in the setting of SHANTANU. Digoxin level 3.7>>2  HAGMA 2/2 lactic acidosis (hypoperfusion, septic shock?),  uremia 2/2 SHANTANU, improving. Lactic acid down to 1.7  Hyperkalemia 2/2 SHANTANU, resolved. Elevated pro-BNP, multifactorial, likely acute decompensated HFpEF, septic shock. Elevated troponin likely 2/2 demand ischemia  Hypernatremia  SHANTANU Stage III on CKD multifactorial, pre-renal (hemodynamically mediated, DHN 2/2 diuretic use, poor oral intake); vs ATN 2/2 septic shock.   Metabolic alkalosis likely contraction alkalosis 2/2 diuresis, poor oral intake- improving  Pancytopenia likely 2/2 BM suppression 2/2 chronic illness, meds (Zosyn)  Thrombocytopenia,likely 2/2 sepsis, less likely HIT (not exposed to heparin products), DIC, hemolysis ruled out (work-up negative)   Low grade fever, multifactorial, ? meds (Precedex?), CLABSI?, no new leukocytosis, or hemodynamic instability    Plan:  Still on 2L NC. Wean oxygen as able. Continue breathing treatments  Chest tube in place. Removal per pulmonology  Completed 10-days of antibiotics. Monitor off antibiotics. Continue to metoprolol  Continue to hold eliquis and aspirin  Continue PPI BID  Follow H/H at 1600. Transfuse if Hgb <7. Consider general surgery consult for possible GI bleed, as patient is FOBT+ with slowly downtrending Hgb in the past 72 hours. Diuresis per nephro  Home ropinirole resumed today  Eventual disposition at [facility] started. Continue current management.     Leanne Dick MD PGY-2  7/16/2022  12:32 PM

## 2022-07-16 NOTE — PROGRESS NOTES
[facility]  Department of Internal Medicine  Division of Pulmonary, Critical Care and Sleep Medicine  Progress Note    Jamie Beckett DO, MPH, [facility], [facility], [address] [facility] V[address]ctor CRABTREE MD      Patient: Lenny Dorsey  MRN: [id number]  : 1950    Encounter Time: 3:14 PM     Date of Admission: 2022 12:25 PM    Primary Care Physician: No primary care provider on file. Reason for Consultation: Drain CT management     SUBJECTIVE:    Seems ok  Getting a bath    OBJECTIVE:     PHYSICAL EXAM:   VITALS:   Vitals:    07/15/22 2045 22 0315 22 0834 22 1424   BP: 101/61 102/62 (!) 117/58 (!) 98/52   Pulse: 89 76 83 82   Resp:    Temp: 98.3 °F (36.8 °C) 98.1 °F (36.7 °C) 98.5 °F (36.9 °C) 98.3 °F (36.8 °C)   TempSrc: Oral Oral Oral Oral   SpO2: 95% 95%     Weight:       Height:            Intake/Output Summary (Last 24 hours) at 2022 1514  Last data filed at 2022 0834  Gross per 24 hour   Intake 360 ml   Output 1000 ml   Net -640 ml          CONSTITUTIONAL:    Alert  SKIN:     No rash,   HEENT:     EOMI, MMM, No thrush  NECK:    No bruits, No JVP appreciated  CV:      Sinus,  No murmur, No rubs, No gallops  PULMONARY:   Couse BS,  No Wheezing, No Rales, No Rhonchi      CT noted  ABDOMEN:     Soft, non-tender. BS normal. No R/R/G  EXT:    No deformities . No clubbing. Edema ower extremity edema, No venous stasis  PULSE:   Appears equal and palpable.   PSYCHIATRIC:  Seems appropriate, No acute psychosis  MS:    Gross weakness  NEUROLOGIC:   The clinical assessment is non-focal     DATA: IMAGING & TESTING:     LABORATORY TESTS:    CBC:   Lab Results   Component Value Date/Time    WBC 5.2 2022 07:33 AM    RBC 2.68 2022 07:33 AM    HGB 7.1 2022 07:33 AM    HCT 24.5 2022 07:33 AM    MCV 91.4 2022 07:33 AM    MCH 26.5 2022 07:33 AM    MCHC 29.0 2022 07:33 AM    RDW 15.8 07/16/2022 07:33 AM     07/16/2022 07:33 AM    MPV 11.3 07/16/2022 07:33 AM     CMP:    Lab Results   Component Value Date/Time     07/16/2022 05:19 AM    K 3.5 07/16/2022 05:19 AM    K 5.0 07/06/2022 02:41 AM     07/16/2022 05:19 AM    CO2 30 07/16/2022 05:19 AM    BUN 23 07/16/2022 05:19 AM    CREATININE 1.1 07/16/2022 05:19 AM    GFRAA 59 07/16/2022 05:19 AM    LABGLOM 49 07/16/2022 05:19 AM    GLUCOSE 133 07/16/2022 05:19 AM    PROT 5.4 07/16/2022 05:19 AM    LABALBU 3.2 07/16/2022 05:19 AM    CALCIUM 8.2 07/16/2022 05:19 AM    BILITOT 0.8 07/16/2022 05:19 AM    ALKPHOS 71 07/16/2022 05:19 AM    AST 11 07/16/2022 05:19 AM    ALT <5 07/16/2022 05:19 AM     Calcium:    Lab Results   Component Value Date/Time    CALCIUM 8.2 07/16/2022 05:19 AM     Ionized Calcium:  No results found for: IONCA  Magnesium:    Lab Results   Component Value Date/Time    MG 1.8 07/16/2022 05:19 AM     Phosphorus:    Lab Results   Component Value Date/Time    PHOS 2.9 07/16/2022 05:19 AM     PT/INR:    Lab Results   Component Value Date/Time    PROTIME 15.0 07/15/2022 05:32 AM    INR 1.3 07/15/2022 05:32 AM        PRO-BNP:   Lab Results   Component Value Date    PROBNP 37,248 (H) 07/12/2022    PROBNP 41,368 (H) 07/06/2022      ABGs:   Lab Results   Component Value Date/Time    PH 7.452 07/11/2022 05:07 AM    PO2 133.6 07/11/2022 05:07 AM    PCO2 40.0 07/11/2022 05:07 AM     Hemoglobin A1C: No components found for: HGBA1C    IMAGING:  Imaging tests were completed and reviewed and discussed radiology and care team involved and reveals   Echo Complete    Result Date: 7/7/2022  Findings   Left Ventricle  Micro-bubble contrast injected to enhance left ventricular visualization. Normal left ventricular size and systolic function. Ejection fraction is visually estimated at 70-75%. Indeterminate diastolic function. No regional wall motion abnormalities seen. Mild left ventricular concentric hypertrophy noted.    Right Ventricle  Moderately dilated right ventricle. Right ventricle global systolic function is reduced. TAPSE 1.1 cm. Left Atrium  The left atrium is moderate-severely dilated. JUANY 48 ml/m2. The interatrial septum appears intact. Right Atrium  Mildly enlarged right atrium. Mitral Valve  Structurally normal mitral valve. No evidence of mitral valve stenosis. Physiologic mitral regurgitation. Tricuspid Valve  The tricuspid valve appears structurally normal.  Mild tricuspid regurgitation. RVSP is at least 60 mmHg. Aortic Valve  Individual aortic valve leaflets are not clearly visualized. No hemodynamically significant aortic stenosis is present. No evidence of aortic valve regurgitation. Pulmonic Valve  The pulmonic valve was not well visualized. No evidence of pulmonic valve stenosis. No evidence of any pulmonic regurgitation. Pericardial Effusion  Prominent pericardial fat pad. No definitive evidence of pericardial effusion. Aorta  Aortic root dimension within normal limits. Miscellaneous  Dilated Inferior Vena Cava. Conclusions   Summary  Technically difficult study - limited visualization. Micro-bubble contrast injected to enhance left ventricular visualization. Normal left ventricular size and systolic function. Ejection fraction is visually estimated at 70-75%. Indeterminate diastolic function. No regional wall motion abnormalities seen. Mild left ventricular concentric hypertrophy noted. Moderately dilated right ventricle with reduced function. Biatrial dilation. Mild tricuspid regurgitation. RVSP is at least 60 mmHg. Prominent pericardial fat pad. No definitive evidence of pericardial effusion. Result Date: 7/5/2022  FINDINGS: Lower Chest: See the CT report of the chest dated the same date for full details. Organs: The liver is grossly unremarkable. The spleen is unremarkable. Gallbladder is been surgically removed.   The pancreas reveals some mild fatty replacement. Both adrenal glands are within normal limits. The kidneys are unremarkable in appearance. GI/Bowel: Stomach is within normal limits. No mucosal abnormality. Stool seen scattered diffusely throughout the colon. There is no wall thickening. No mucosal abnormality or distension of the small bowel. Pelvis: Pelvic organs reveal mild wall thickening of the bladder with incomplete distension. The uterus is grossly unremarkable. Peritoneum/Retroperitoneum: There is no abdominal retroperitoneal lymphadenopathy. There is mild stranding identified in the root of the mesentery as well as surrounding the pancreas in which mild inflammation of the pancreas cannot be completely excluded. Correlation to clinical lab values is recommended. No pelvic adenopathy with moderate atherosclerotic disease seen within the abdominal aorta and iliac vessels. Free fluid identified throughout the upper abdomen. Extensive vascular calcifications seen within the mesenteric vessels as well as the abdominal aorta. Bones/Soft Tissues: The bony structures reveal extensive degenerative changes seen within the spine and pelvis. Severe degenerative changes seen within the sacroiliac joints bilaterally right greater than left some sclerosis on the right to suggest prior healed sacroiliitis. There is extensive anasarca seen within the subcutaneous tissues. Increasing fluid within the abdomen when compared to the prior study. The anasarca is also increased in appearance of the prior examination. Mild stranding seen around the mesentery which correlation to clinical lab values is recommended to exclude possible pancreatitis or volume overload. Overall the appearance of the pancreas itself is grossly unremarkable. There is only mild stranding seen throughout the mesenteric root. CT HEAD WO CONTRAST  Result Date: 7/5/2022  No acute intracranial abnormality or significant change in the overall appearance of the brain.  MRI would be useful if symptoms persist. RECOMMENDATIONS: Unavailable     CT CHEST WO CONTRAST    Result Date: 7/5/2022  FINDINGS: Mediastinum: The cardiac size is enlarged. There is a small pericardial effusion. Minimal probable gas in the right atrium is likely secondary to recent intravenous injection. The esophagus is normal.  No definite mediastinal masses or lymphadenopathy. Lungs/pleura: There is consolidation posterior right upper lobe and right middle lobe with linear density in the lingula and at the left posterior lung base. There is right lower lobe consolidation and moderate right pleural effusion. Upper Abdomen: There is a small volume of right upper quadrant ascites. There are surgical clips in the gallbladder fossa and right upper anterior abdominal wall. Soft Tissues/Bones: There is mild induration of the subcutaneous fat. There is moderate lower thoracic spondylosis with slight levoconvex curvature of the thoracic spine. No definite lytic or blastic osseous lesions. Bilateral lung infiltrates with greatest consolidation right lower lobe probably secondary to atelectasis and or pneumonia. Moderate right pleural effusion. Cardiomegaly and small pericardial effusion. The pericardial effusion is new. Small volume right upper abdominal ascites along with anasarca. This is new. CT GUIDED CHEST TUBE  Result Date: 7/8/2022  [facility] After obtaining informed consent, following the routine sterile prep and drape and after the administration of local anesthesia a needle was inserted into the right pleural space . Serous fluid was aspirated. Subsequently a guidewire was passed through the needle. Subsequently the needle was removed and over the guidewire the tract was dilated. Following dilatation a locking loop catheter was placed. Post procedure CT scan reveals the tube to be in good position. Specimen was sent to the laboratory. The patient tolerated the procedure well.  There were no complications. Prior to the procedure a timeout occurred at  1542 hours. The patient received 9 second  of  fluoroscopy. The patient received local anesthesia. The patient was monitored for 60 minutes by a registered nurse. Successful uncomplicated  right chest tube placement Recommendations: 1. Follow-up tube check in 2 weeks 2. Flush tube daily with 5 to 10 mL of sterile saline     MRI BRAIN WO CONTRAST  Result Date: 7/8/2022  FINDINGS: INTRACRANIAL STRUCTURES/VENTRICLES: There is no acute infarct. No mass effect, edema or hemorrhage is seen. Mild volume loss is seen in the cerebrum with mild chronic microvascular ischemic changes. No hydrocephalus or extra-axial fluid is seen. ORBITS: Prosthetic lenses are seen in the globes bilaterally. The orbits are otherwise grossly unremarkable. SINUSES: Mild mucosal thickening in the paranasal sinuses. There is pooling of secretions in the nasopharynx. Moderate bilateral mastoid effusions. BONES/SOFT TISSUES: The bone marrow signal intensity appears normal. The soft tissues demonstrate no acute abnormality. 1.  No acute intracranial abnormality. 2. No gross epileptogenic lesion is identified. 3. Bilateral mastoid effusions, which may be due to eustachian tube dysfunction or otomastoiditis. RECOMMENDATIONS: Unavailable     US DUP LOWER EXTREMITIES BILATERAL VENOUS  Within the visualized vessels there is no evidence for deep venous thrombosis       Assessment: 1. Acute hypoxic hypercapnic respiratory failure 2/2 mod R pleural effusion, possible HAP vs acute decompensated  HFpEF exacerbation (EF 70-75%)  2. Pulmonary hypertension likely Type II 2/2 acute decompensated diastolic heart failure, in the setting of Hx SERENA  3. Moderate R pleural effusion, likely 2/2 HAP (Klebsiella pneumoniae) vs acute decompensated heart failure. D4 CT, 250 cc output in 24 hrs  4. HAP (Klebsiella pneumoniae on resp culture, light growth). D1 cefepime  5.  Elevated pro-BNP, multifactorial, likely acute decompensated HFpEF, septic shock. 6. Elevated troponin likely 2/2 demand ischemia  7. Permanent atrial fibrillation, s/p DCCV (April and May 2022), now rate controlled. 8. SHANTANU Stage III on CKD multifactorial, pre-renal (hemodynamically mediated, DHN 2/2 diuretic use, poor oral intake); vs ATN 2/2 septic shock. 9. Hypernatremia likely 2/2 over diuresis  10. Metabolic alkalosis likely contraction alkalosis 2/2 diuresis, poor oral intake  11. Pancytopenia likely 2/2 BM suppression 2/2 chronic illness, meds (Zosyn)  12. Acute on chronic macrocytic anemia, multifactorial, likely 2/2 chronic disease, blood draws,  Less likely hemolysis (no schistocytes, haptoglobin wnl) s/p 1 unit PRBC 7/11, FOBT positive today, with pinkish tinge on chest tube output  13. Thrombocytopenia,likely 2/2 #11, less likely HIT (not exposed to heparin products), DIC, hemolysis ruled out (work-up negative)  14. Prolonged INR, ? HFP wnl, no overt bleeding, apixaban and aspirin on hold. Given Vit K yesterday for INR 3.2>2 today  15. Low grade fever, multifactorial, ? meds (Precedex?), CLABSI?, no new leukocytosis, or hemodynamic instability   16. Hx of asthma  17. Hx of SERENA. Not using CPAP at home  18. Hx of HTN. -160`s  19. Hx of CAD  20. Hx of Type 2 DM with neuropathy. On Lantus 10 and Novolog SS at home  21. Hx of depression/anxiety. 22. Hx of restless leg syndrome  23. Hx of Parkinson's disease.  On Sinemet and ropinirole at home, which was discontinued upon discharge at [address]:   30 TEDDY Escobar DO DO, MPH, [facility]  Professor of Internal Medicine  Pulmonary, Critical Care and Sleep Medicine

## 2022-07-17 LAB
ABO/RH: NORMAL
ALBUMIN SERPL-MCNC: 3.2 G/DL (ref 3.5–5.2)
ALP BLD-CCNC: 75 U/L (ref 35–104)
ALT SERPL-CCNC: <5 U/L (ref 0–32)
ANION GAP SERPL CALCULATED.3IONS-SCNC: 7 MMOL/L (ref 7–16)
ANTIBODY SCREEN: NORMAL
APTT: 27.6 SEC (ref 24.5–35.1)
AST SERPL-CCNC: 8 U/L (ref 0–31)
BASOPHILS ABSOLUTE: 0.01 E9/L (ref 0–0.2)
BASOPHILS RELATIVE PERCENT: 0.2 % (ref 0–2)
BILIRUB SERPL-MCNC: 0.8 MG/DL (ref 0–1.2)
BLOOD BANK DISPENSE STATUS: NORMAL
BLOOD BANK PRODUCT CODE: NORMAL
BPU ID: NORMAL
BUN BLDV-MCNC: 22 MG/DL (ref 6–23)
CALCIUM SERPL-MCNC: 8.2 MG/DL (ref 8.6–10.2)
CHLORIDE BLD-SCNC: 102 MMOL/L (ref 98–107)
CO2: 33 MMOL/L (ref 22–29)
CREAT SERPL-MCNC: 1.1 MG/DL (ref 0.5–1)
DESCRIPTION BLOOD BANK: NORMAL
EOSINOPHILS ABSOLUTE: 0.05 E9/L (ref 0.05–0.5)
EOSINOPHILS RELATIVE PERCENT: 1 % (ref 0–6)
GFR AFRICAN AMERICAN: 59
GFR NON-AFRICAN AMERICAN: 49 ML/MIN/1.73
GLUCOSE BLD-MCNC: 160 MG/DL (ref 74–99)
HCT VFR BLD CALC: 22.8 % (ref 34–48)
HCT VFR BLD CALC: 24.2 % (ref 34–48)
HEMOGLOBIN: 6.7 G/DL (ref 11.5–15.5)
HEMOGLOBIN: 7.1 G/DL (ref 11.5–15.5)
IMMATURE GRANULOCYTES #: 0.11 E9/L
IMMATURE GRANULOCYTES %: 2.2 % (ref 0–5)
INR BLD: 1.4
LYMPHOCYTES ABSOLUTE: 0.64 E9/L (ref 1.5–4)
LYMPHOCYTES RELATIVE PERCENT: 12.9 % (ref 20–42)
MAGNESIUM: 1.9 MG/DL (ref 1.6–2.6)
MCH RBC QN AUTO: 27.1 PG (ref 26–35)
MCHC RBC AUTO-ENTMCNC: 29.3 % (ref 32–34.5)
MCV RBC AUTO: 92.4 FL (ref 80–99.9)
METER GLUCOSE: 145 MG/DL (ref 74–99)
METER GLUCOSE: 146 MG/DL (ref 74–99)
METER GLUCOSE: 181 MG/DL (ref 74–99)
MONOCYTES ABSOLUTE: 0.47 E9/L (ref 0.1–0.95)
MONOCYTES RELATIVE PERCENT: 9.5 % (ref 2–12)
NEUTROPHILS ABSOLUTE: 3.68 E9/L (ref 1.8–7.3)
NEUTROPHILS RELATIVE PERCENT: 74.2 % (ref 43–80)
PDW BLD-RTO: 16.2 FL (ref 11.5–15)
PHOSPHORUS: 2.6 MG/DL (ref 2.5–4.5)
PLATELET # BLD: 212 E9/L (ref 130–450)
PMV BLD AUTO: 11.3 FL (ref 7–12)
POTASSIUM SERPL-SCNC: 3.7 MMOL/L (ref 3.5–5)
PROTHROMBIN TIME: 14.9 SEC (ref 9.3–12.4)
RBC # BLD: 2.62 E12/L (ref 3.5–5.5)
SODIUM BLD-SCNC: 142 MMOL/L (ref 132–146)
TOTAL PROTEIN: 5.3 G/DL (ref 6.4–8.3)
WBC # BLD: 5 E9/L (ref 4.5–11.5)

## 2022-07-17 PROCEDURE — 2580000003 HC RX 258: Performed by: STUDENT IN AN ORGANIZED HEALTH CARE EDUCATION/TRAINING PROGRAM

## 2022-07-17 PROCEDURE — 2060000000 HC ICU INTERMEDIATE R&B

## 2022-07-17 PROCEDURE — 6360000002 HC RX W HCPCS: Performed by: INTERNAL MEDICINE

## 2022-07-17 PROCEDURE — 85025 COMPLETE CBC W/AUTO DIFF WBC: CPT

## 2022-07-17 PROCEDURE — 6360000002 HC RX W HCPCS: Performed by: STUDENT IN AN ORGANIZED HEALTH CARE EDUCATION/TRAINING PROGRAM

## 2022-07-17 PROCEDURE — P9016 RBC LEUKOCYTES REDUCED: HCPCS

## 2022-07-17 PROCEDURE — 6370000000 HC RX 637 (ALT 250 FOR IP)

## 2022-07-17 PROCEDURE — 36430 TRANSFUSION BLD/BLD COMPNT: CPT

## 2022-07-17 PROCEDURE — 6360000002 HC RX W HCPCS

## 2022-07-17 PROCEDURE — 85610 PROTHROMBIN TIME: CPT

## 2022-07-17 PROCEDURE — 6370000000 HC RX 637 (ALT 250 FOR IP): Performed by: INTERNAL MEDICINE

## 2022-07-17 PROCEDURE — 85014 HEMATOCRIT: CPT

## 2022-07-17 PROCEDURE — S5553 INSULIN LONG ACTING 5 U: HCPCS | Performed by: INTERNAL MEDICINE

## 2022-07-17 PROCEDURE — 82962 GLUCOSE BLOOD TEST: CPT

## 2022-07-17 PROCEDURE — 85018 HEMOGLOBIN: CPT

## 2022-07-17 PROCEDURE — 2700000000 HC OXYGEN THERAPY PER DAY

## 2022-07-17 PROCEDURE — 2580000003 HC RX 258: Performed by: INTERNAL MEDICINE

## 2022-07-17 PROCEDURE — 86850 RBC ANTIBODY SCREEN: CPT

## 2022-07-17 PROCEDURE — 83735 ASSAY OF MAGNESIUM: CPT

## 2022-07-17 PROCEDURE — 86901 BLOOD TYPING SEROLOGIC RH(D): CPT

## 2022-07-17 PROCEDURE — 97530 THERAPEUTIC ACTIVITIES: CPT

## 2022-07-17 PROCEDURE — 80053 COMPREHEN METABOLIC PANEL: CPT

## 2022-07-17 PROCEDURE — 84100 ASSAY OF PHOSPHORUS: CPT

## 2022-07-17 PROCEDURE — 99231 SBSQ HOSP IP/OBS SF/LOW 25: CPT | Performed by: INTERNAL MEDICINE

## 2022-07-17 PROCEDURE — C9113 INJ PANTOPRAZOLE SODIUM, VIA: HCPCS | Performed by: STUDENT IN AN ORGANIZED HEALTH CARE EDUCATION/TRAINING PROGRAM

## 2022-07-17 PROCEDURE — 86923 COMPATIBILITY TEST ELECTRIC: CPT

## 2022-07-17 PROCEDURE — 99232 SBSQ HOSP IP/OBS MODERATE 35: CPT | Performed by: INTERNAL MEDICINE

## 2022-07-17 PROCEDURE — 86900 BLOOD TYPING SEROLOGIC ABO: CPT

## 2022-07-17 PROCEDURE — 36415 COLL VENOUS BLD VENIPUNCTURE: CPT

## 2022-07-17 PROCEDURE — A4216 STERILE WATER/SALINE, 10 ML: HCPCS | Performed by: STUDENT IN AN ORGANIZED HEALTH CARE EDUCATION/TRAINING PROGRAM

## 2022-07-17 PROCEDURE — 6370000000 HC RX 637 (ALT 250 FOR IP): Performed by: STUDENT IN AN ORGANIZED HEALTH CARE EDUCATION/TRAINING PROGRAM

## 2022-07-17 PROCEDURE — 94640 AIRWAY INHALATION TREATMENT: CPT

## 2022-07-17 PROCEDURE — 85730 THROMBOPLASTIN TIME PARTIAL: CPT

## 2022-07-17 RX ORDER — MAGNESIUM SULFATE 1 G/100ML
1000 INJECTION INTRAVENOUS ONCE
Status: COMPLETED | OUTPATIENT
Start: 2022-07-17 | End: 2022-07-17

## 2022-07-17 RX ORDER — SODIUM CHLORIDE 9 MG/ML
INJECTION, SOLUTION INTRAVENOUS PRN
Status: DISCONTINUED | OUTPATIENT
Start: 2022-07-17 | End: 2022-07-27

## 2022-07-17 RX ADMIN — INSULIN LISPRO 1 UNITS: 100 INJECTION, SOLUTION INTRAVENOUS; SUBCUTANEOUS at 17:51

## 2022-07-17 RX ADMIN — METOPROLOL TARTRATE 50 MG: 50 TABLET, FILM COATED ORAL at 08:23

## 2022-07-17 RX ADMIN — PANTOPRAZOLE SODIUM 40 MG: 40 INJECTION, POWDER, FOR SOLUTION INTRAVENOUS at 20:00

## 2022-07-17 RX ADMIN — IPRATROPIUM BROMIDE AND ALBUTEROL SULFATE 1 AMPULE: .5; 2.5 SOLUTION RESPIRATORY (INHALATION) at 20:08

## 2022-07-17 RX ADMIN — INSULIN LISPRO 1 UNITS: 100 INJECTION, SOLUTION INTRAVENOUS; SUBCUTANEOUS at 20:01

## 2022-07-17 RX ADMIN — Medication 5 MG: at 19:59

## 2022-07-17 RX ADMIN — IPRATROPIUM BROMIDE AND ALBUTEROL SULFATE 1 AMPULE: .5; 2.5 SOLUTION RESPIRATORY (INHALATION) at 16:03

## 2022-07-17 RX ADMIN — IPRATROPIUM BROMIDE AND ALBUTEROL SULFATE 1 AMPULE: .5; 2.5 SOLUTION RESPIRATORY (INHALATION) at 08:02

## 2022-07-17 RX ADMIN — PANTOPRAZOLE SODIUM 40 MG: 40 INJECTION, POWDER, FOR SOLUTION INTRAVENOUS at 11:57

## 2022-07-17 RX ADMIN — ROPINIROLE HYDROCHLORIDE 1 MG: 1 TABLET, FILM COATED ORAL at 19:58

## 2022-07-17 RX ADMIN — BUDESONIDE 500 MCG: 0.5 SUSPENSION RESPIRATORY (INHALATION) at 20:08

## 2022-07-17 RX ADMIN — CARBIDOPA AND LEVODOPA 2 TABLET: 25; 100 TABLET, EXTENDED RELEASE ORAL at 14:04

## 2022-07-17 RX ADMIN — IPRATROPIUM BROMIDE AND ALBUTEROL SULFATE 1 AMPULE: .5; 2.5 SOLUTION RESPIRATORY (INHALATION) at 12:30

## 2022-07-17 RX ADMIN — CARBIDOPA AND LEVODOPA 2 TABLET: 25; 100 TABLET, EXTENDED RELEASE ORAL at 08:30

## 2022-07-17 RX ADMIN — MAGNESIUM SULFATE HEPTAHYDRATE 1000 MG: 1 INJECTION, SOLUTION INTRAVENOUS at 08:36

## 2022-07-17 RX ADMIN — ROPINIROLE HYDROCHLORIDE 1 MG: 1 TABLET, FILM COATED ORAL at 08:24

## 2022-07-17 RX ADMIN — QUETIAPINE FUMARATE 25 MG: 25 TABLET ORAL at 08:23

## 2022-07-17 RX ADMIN — SODIUM CHLORIDE, PRESERVATIVE FREE 10 ML: 5 INJECTION INTRAVENOUS at 08:23

## 2022-07-17 RX ADMIN — CARBIDOPA AND LEVODOPA 2 TABLET: 25; 100 TABLET, EXTENDED RELEASE ORAL at 19:59

## 2022-07-17 RX ADMIN — FUROSEMIDE 40 MG: 10 INJECTION, SOLUTION INTRAMUSCULAR; INTRAVENOUS at 18:01

## 2022-07-17 RX ADMIN — METOPROLOL TARTRATE 50 MG: 50 TABLET, FILM COATED ORAL at 19:59

## 2022-07-17 RX ADMIN — ATORVASTATIN CALCIUM 20 MG: 20 TABLET, FILM COATED ORAL at 08:23

## 2022-07-17 RX ADMIN — BUDESONIDE 500 MCG: 0.5 SUSPENSION RESPIRATORY (INHALATION) at 08:02

## 2022-07-17 RX ADMIN — INSULIN LISPRO 1 UNITS: 100 INJECTION, SOLUTION INTRAVENOUS; SUBCUTANEOUS at 12:07

## 2022-07-17 RX ADMIN — FUROSEMIDE 40 MG: 10 INJECTION, SOLUTION INTRAMUSCULAR; INTRAVENOUS at 08:23

## 2022-07-17 RX ADMIN — INSULIN LISPRO 1 UNITS: 100 INJECTION, SOLUTION INTRAVENOUS; SUBCUTANEOUS at 08:23

## 2022-07-17 RX ADMIN — SODIUM CHLORIDE, PRESERVATIVE FREE 10 ML: 5 INJECTION INTRAVENOUS at 20:00

## 2022-07-17 RX ADMIN — AMIODARONE HYDROCHLORIDE 200 MG: 200 TABLET ORAL at 08:23

## 2022-07-17 RX ADMIN — INSULIN GLARGINE-YFGN 5 UNITS: 100 INJECTION, SOLUTION SUBCUTANEOUS at 20:01

## 2022-07-17 RX ADMIN — POTASSIUM BICARBONATE 20 MEQ: 782 TABLET, EFFERVESCENT ORAL at 08:23

## 2022-07-17 RX ADMIN — ROPINIROLE HYDROCHLORIDE 1 MG: 1 TABLET, FILM COATED ORAL at 14:04

## 2022-07-17 RX ADMIN — QUETIAPINE FUMARATE 25 MG: 25 TABLET ORAL at 19:59

## 2022-07-17 NOTE — PROGRESS NOTES
[facility]  Department of Internal Medicine  Division of Pulmonary, Critical Care and Sleep Medicine  Progress Note    Matteo Escobar DO, MPH, Rodrigo [address], [address] [facility] , Trini Nuñez MD      Patient: Mamadou Delgado  MRN: [id number]  : 1950    Encounter Time: 11:06 AM     Date of Admission: 2022 12:25 PM    Primary Care Physician: No primary care provider on file. Reason for Consultation: Drain CT management     SUBJECTIVE:    Seems ok  Getting a bath    OBJECTIVE:     PHYSICAL EXAM:   VITALS:   Vitals:    22 0834 22 1424 22 2046 22 0735   BP: (!) 117/58 (!) 98/52 (!) 110/56 (!) 112/58   Pulse: 83 82 78 83   Resp:    Temp: 98.5 °F (36.9 °C) 98.3 °F (36.8 °C) 98.2 °F (36.8 °C) 98.6 °F (37 °C)   TempSrc: Oral Oral Oral Oral   SpO2:  94% 95% 94%   Weight:       Height:            Intake/Output Summary (Last 24 hours) at 2022 1106  Last data filed at 2022 0735  Gross per 24 hour   Intake 480 ml   Output 1430 ml   Net -950 ml          CONSTITUTIONAL:    Alert  SKIN:     No rash,   HEENT:     EOMI, MMM, No thrush  NECK:    No bruits, No JVP appreciated  CV:      Sinus,  No murmur, No rubs, No gallops  PULMONARY:   Couse BS,  No Wheezing, No Rales, No Rhonchi      CT noted  ABDOMEN:     Soft, non-tender. BS normal. No R/R/G  EXT:    No deformities . No clubbing. Edema ower extremity edema, No venous stasis  PULSE:   Appears equal and palpable.   PSYCHIATRIC:  Seems appropriate, No acute psychosis  MS:    Gross weakness  NEUROLOGIC:   The clinical assessment is non-focal     DATA: IMAGING & TESTING:     LABORATORY TESTS:    CBC:   Lab Results   Component Value Date/Time    WBC 5.0 2022 04:32 AM    RBC 2.62 2022 04:32 AM    HGB 7.1 2022 04:32 AM    HCT 24.2 2022 04:32 AM    MCV 92.4 2022 04:32 AM    MCH 27.1 2022 04:32 AM    MCHC 29.3 2022 04:32 AM RDW 16.2 07/17/2022 04:32 AM     07/17/2022 04:32 AM    MPV 11.3 07/17/2022 04:32 AM     CMP:    Lab Results   Component Value Date/Time     07/17/2022 04:32 AM    K 3.7 07/17/2022 04:32 AM    K 5.0 07/06/2022 02:41 AM     07/17/2022 04:32 AM    CO2 33 07/17/2022 04:32 AM    BUN 22 07/17/2022 04:32 AM    CREATININE 1.1 07/17/2022 04:32 AM    GFRAA 59 07/17/2022 04:32 AM    LABGLOM 49 07/17/2022 04:32 AM    GLUCOSE 160 07/17/2022 04:32 AM    PROT 5.3 07/17/2022 04:32 AM    LABALBU 3.2 07/17/2022 04:32 AM    CALCIUM 8.2 07/17/2022 04:32 AM    BILITOT 0.8 07/17/2022 04:32 AM    ALKPHOS 75 07/17/2022 04:32 AM    AST 8 07/17/2022 04:32 AM    ALT <5 07/17/2022 04:32 AM     Calcium:    Lab Results   Component Value Date/Time    CALCIUM 8.2 07/17/2022 04:32 AM     Ionized Calcium:  No results found for: IONCA  Magnesium:    Lab Results   Component Value Date/Time    MG 1.9 07/17/2022 04:32 AM     Phosphorus:    Lab Results   Component Value Date/Time    PHOS 2.6 07/17/2022 04:32 AM     PT/INR:    Lab Results   Component Value Date/Time    PROTIME 14.9 07/17/2022 04:32 AM    INR 1.4 07/17/2022 04:32 AM        PRO-BNP:   Lab Results   Component Value Date    PROBNP 37,248 (H) 07/12/2022    PROBNP 41,368 (H) 07/06/2022      ABGs:   Lab Results   Component Value Date/Time    PH 7.452 07/11/2022 05:07 AM    PO2 133.6 07/11/2022 05:07 AM    PCO2 40.0 07/11/2022 05:07 AM     Hemoglobin A1C: No components found for: HGBA1C    IMAGING:  Imaging tests were completed and reviewed and discussed radiology and care team involved and reveals   Echo Complete    Result Date: 7/7/2022  Findings   Left Ventricle  Micro-bubble contrast injected to enhance left ventricular visualization. Normal left ventricular size and systolic function. Ejection fraction is visually estimated at 70-75%. Indeterminate diastolic function. No regional wall motion abnormalities seen. Mild left ventricular concentric hypertrophy noted. Right Ventricle  Moderately dilated right ventricle. Right ventricle global systolic function is reduced. TAPSE 1.1 cm. Left Atrium  The left atrium is moderate-severely dilated. JUANY 48 ml/m2. The interatrial septum appears intact. Right Atrium  Mildly enlarged right atrium. Mitral Valve  Structurally normal mitral valve. No evidence of mitral valve stenosis. Physiologic mitral regurgitation. Tricuspid Valve  The tricuspid valve appears structurally normal.  Mild tricuspid regurgitation. RVSP is at least 60 mmHg. Aortic Valve  Individual aortic valve leaflets are not clearly visualized. No hemodynamically significant aortic stenosis is present. No evidence of aortic valve regurgitation. Pulmonic Valve  The pulmonic valve was not well visualized. No evidence of pulmonic valve stenosis. No evidence of any pulmonic regurgitation. Pericardial Effusion  Prominent pericardial fat pad. No definitive evidence of pericardial effusion. Aorta  Aortic root dimension within normal limits. Miscellaneous  Dilated Inferior Vena Cava. Conclusions   Summary  Technically difficult study - limited visualization. Micro-bubble contrast injected to enhance left ventricular visualization. Normal left ventricular size and systolic function. Ejection fraction is visually estimated at 70-75%. Indeterminate diastolic function. No regional wall motion abnormalities seen. Mild left ventricular concentric hypertrophy noted. Moderately dilated right ventricle with reduced function. Biatrial dilation. Mild tricuspid regurgitation. RVSP is at least 60 mmHg. Prominent pericardial fat pad. No definitive evidence of pericardial effusion. Result Date: 7/5/2022  FINDINGS: Lower Chest: See the CT report of the chest dated the same date for full details. Organs: The liver is grossly unremarkable. The spleen is unremarkable. Gallbladder is been surgically removed.   The pancreas reveals some mild fatty replacement. Both adrenal glands are within normal limits. The kidneys are unremarkable in appearance. GI/Bowel: Stomach is within normal limits. No mucosal abnormality. Stool seen scattered diffusely throughout the colon. There is no wall thickening. No mucosal abnormality or distension of the small bowel. Pelvis: Pelvic organs reveal mild wall thickening of the bladder with incomplete distension. The uterus is grossly unremarkable. Peritoneum/Retroperitoneum: There is no abdominal retroperitoneal lymphadenopathy. There is mild stranding identified in the root of the mesentery as well as surrounding the pancreas in which mild inflammation of the pancreas cannot be completely excluded. Correlation to clinical lab values is recommended. No pelvic adenopathy with moderate atherosclerotic disease seen within the abdominal aorta and iliac vessels. Free fluid identified throughout the upper abdomen. Extensive vascular calcifications seen within the mesenteric vessels as well as the abdominal aorta. Bones/Soft Tissues: The bony structures reveal extensive degenerative changes seen within the spine and pelvis. Severe degenerative changes seen within the sacroiliac joints bilaterally right greater than left some sclerosis on the right to suggest prior healed sacroiliitis. There is extensive anasarca seen within the subcutaneous tissues. Increasing fluid within the abdomen when compared to the prior study. The anasarca is also increased in appearance of the prior examination. Mild stranding seen around the mesentery which correlation to clinical lab values is recommended to exclude possible pancreatitis or volume overload. Overall the appearance of the pancreas itself is grossly unremarkable. There is only mild stranding seen throughout the mesenteric root. CT HEAD WO CONTRAST  Result Date: 7/5/2022  No acute intracranial abnormality or significant change in the overall appearance of the brain.  MRI would be useful if symptoms persist. RECOMMENDATIONS: Unavailable     CT CHEST WO CONTRAST    Result Date: 7/5/2022  FINDINGS: Mediastinum: The cardiac size is enlarged. There is a small pericardial effusion. Minimal probable gas in the right atrium is likely secondary to recent intravenous injection. The esophagus is normal.  No definite mediastinal masses or lymphadenopathy. Lungs/pleura: There is consolidation posterior right upper lobe and right middle lobe with linear density in the lingula and at the left posterior lung base. There is right lower lobe consolidation and moderate right pleural effusion. Upper Abdomen: There is a small volume of right upper quadrant ascites. There are surgical clips in the gallbladder fossa and right upper anterior abdominal wall. Soft Tissues/Bones: There is mild induration of the subcutaneous fat. There is moderate lower thoracic spondylosis with slight levoconvex curvature of the thoracic spine. No definite lytic or blastic osseous lesions. Bilateral lung infiltrates with greatest consolidation right lower lobe probably secondary to atelectasis and or pneumonia. Moderate right pleural effusion. Cardiomegaly and small pericardial effusion. The pericardial effusion is new. Small volume right upper abdominal ascites along with anasarca. This is new. CT GUIDED CHEST TUBE  Result Date: 7/8/2022  [facility] After obtaining informed consent, following the routine sterile prep and drape and after the administration of local anesthesia a needle was inserted into the right pleural space . Serous fluid was aspirated. Subsequently a guidewire was passed through the needle. Subsequently the needle was removed and over the guidewire the tract was dilated. Following dilatation a locking loop catheter was placed. Post procedure CT scan reveals the tube to be in good position. Specimen was sent to the laboratory. The patient tolerated the procedure well.  There were no complications. Prior to the procedure a timeout occurred at  1542 hours. The patient received 9 second  of  fluoroscopy. The patient received local anesthesia. The patient was monitored for 60 minutes by a registered nurse. Successful uncomplicated  right chest tube placement Recommendations: 1. Follow-up tube check in 2 weeks 2. Flush tube daily with 5 to 10 mL of sterile saline     MRI BRAIN WO CONTRAST  Result Date: 7/8/2022  FINDINGS: INTRACRANIAL STRUCTURES/VENTRICLES: There is no acute infarct. No mass effect, edema or hemorrhage is seen. Mild volume loss is seen in the cerebrum with mild chronic microvascular ischemic changes. No hydrocephalus or extra-axial fluid is seen. ORBITS: Prosthetic lenses are seen in the globes bilaterally. The orbits are otherwise grossly unremarkable. SINUSES: Mild mucosal thickening in the paranasal sinuses. There is pooling of secretions in the nasopharynx. Moderate bilateral mastoid effusions. BONES/SOFT TISSUES: The bone marrow signal intensity appears normal. The soft tissues demonstrate no acute abnormality. 1.  No acute intracranial abnormality. 2. No gross epileptogenic lesion is identified. 3. Bilateral mastoid effusions, which may be due to eustachian tube dysfunction or otomastoiditis. RECOMMENDATIONS: Unavailable     US DUP LOWER EXTREMITIES BILATERAL VENOUS  Within the visualized vessels there is no evidence for deep venous thrombosis       Assessment: 1. Acute hypoxic hypercapnic respiratory failure 2/2 mod R pleural effusion, possible HAP vs acute decompensated  HFpEF exacerbation (EF 70-75%)  2. Pulmonary hypertension likely Type II 2/2 acute decompensated diastolic heart failure, in the setting of Hx SERENA  3. Moderate R pleural effusion, likely 2/2 HAP (Klebsiella pneumoniae) vs acute decompensated heart failure. D4 CT, 250 cc output in 24 hrs  4. HAP (Klebsiella pneumoniae on resp culture, light growth). D1 cefepime  5.  Elevated pro-BNP, multifactorial, likely acute decompensated HFpEF, septic shock. 6. Elevated troponin likely 2/2 demand ischemia  7. Permanent atrial fibrillation, s/p DCCV (April and May 2022), now rate controlled. 8. SHANTANU Stage III on CKD multifactorial, pre-renal (hemodynamically mediated, DHN 2/2 diuretic use, poor oral intake); vs ATN 2/2 septic shock. 9. Hypernatremia likely 2/2 over diuresis  10. Metabolic alkalosis likely contraction alkalosis 2/2 diuresis, poor oral intake  11. Pancytopenia likely 2/2 BM suppression 2/2 chronic illness, meds (Zosyn)  12. Acute on chronic macrocytic anemia, multifactorial, likely 2/2 chronic disease, blood draws,  Less likely hemolysis (no schistocytes, haptoglobin wnl) s/p 1 unit PRBC 7/11, FOBT positive today, with pinkish tinge on chest tube output  13. Thrombocytopenia,likely 2/2 #11, less likely HIT (not exposed to heparin products), DIC, hemolysis ruled out (work-up negative)  14. Prolonged INR, ? HFP wnl, no overt bleeding, apixaban and aspirin on hold. Given Vit K yesterday for INR 3.2>2 today  15. Low grade fever, multifactorial, ? meds (Precedex?), CLABSI?, no new leukocytosis, or hemodynamic instability   16. Hx of asthma  17. Hx of SERENA. Not using CPAP at home  18. Hx of HTN. -160`s  19. Hx of CAD  20. Hx of Type 2 DM with neuropathy. On Lantus 10 and Novolog SS at home  21. Hx of depression/anxiety. 22. Hx of restless leg syndrome  23. Hx of Parkinson's disease.  On Sinemet and ropinirole at home, which was discontinued upon discharge at [address]:   Wean oxygen  Take out the Chest tube        Dora Abebe DO DO, MPH, [facility]  Professor of Internal Medicine  Pulmonary, Critical Care and Sleep Medicine

## 2022-07-17 NOTE — PROGRESS NOTES
Physical Therapy  Physical Therapy Treatment    Name: Shona Wilson  : 1950  MRN: [id number]      Date of Service: 2022    Evaluating PT:  Lianne Rojas PT, DPT [id number]    Room #:  5904/5402-Z  Diagnosis:  Shock ([facility] [address] [R57.9]  Uremia [N19]  SHANTANU (acute kidney injury) ([facility] [address] [N17.9]  Poisoning by digoxin, accidental or unintentional, initial encounter [T46.0X1A]  Altered mental status, unspecified altered mental status type [R41.82]  Acute pancreatitis, unspecified complication status, unspecified pancreatitis type [K85.90]  PMHx/PSHx:  CHF, a fib, anxiety, asthma, CAD, CKD, DM, HLD, HTN, SERENA, parkinson's disease, tubal ligation status  Procedure/Surgery:  Extubated 7/10  Precautions:  Falls, , O2, R chest tube  Equipment Needs:  TBD    SUBJECTIVE:    Pt lives alone in an apartment with level entry and elevator access. Bed is on 1st floor and bath is on 1st floor. Pt ambulated with rollator PTA. Pt reports having conversation with son about transitioning to assisted living, but has not yet been able to. OBJECTIVE:   Initial Evaluation  Date: 22 Treatment  Date:22 Short Term/ Long Term   Goals   AM-PAC 6 Clicks  32/    Was pt agreeable to Eval/treatment? yes yes    Does pt have pain?  L hip pain during mobility No c/o pain    Bed Mobility  Rolling: MaxA x2  Supine to sit: MaxA x2  Sit to supine: maxA x2  Scooting: MaxA x2 Rolling: NT  Supine to sit: Shell  Sit to supine: Shell  Scooting: Shell Rolling: Shell  Supine to sit: Shell  Sit to supine: Shell  Scooting: Shell   Transfers Sit to stand: Shell x2  Stand to sit: Shell x2  Stand pivot: NT Sit to stand: Shell  Stand to sit: Shell  Stand pivot: NT Sit to stand: SBA  Stand to sit: SBA  Stand pivot: SBA with [facility]   Ambulation    NT, unable 3 feet x2 forward and backward with standard walker Shell 50 feet with [facility] SBA   Stair negotiation: ascended and descended  NT NT TBD   ROM BUE:  Defer to OT note  BLE:  WFL     Strength BUE:  Defer to OT note  BLE:  B hip flex 3/5, B knee ext 3+/5, B dorsiflex 3+/5  WFL   Balance Sitting EOB:  SBA<>Shell  Dynamic Standing:  Shell x2 with [employer] Sitting EOB:  SBA  Dynamic Standing:  Shell with standard walker Sitting EOB:  Independent   Dynamic Standing:  SBA with [employer]   Pt is A & O x 4  Sensation:  NT  Edema:  none noted    Patient education  Pt educated on role of PT, importance of continued mobility    Patient response to education:   Pt verbalized understanding Pt demonstrated skill Pt requires further education in this area   yes yes yes     ASSESSMENT:    Comments:  patient semi-supine in bed upon entry and agreeable to PT treatment with encouragement. Pt reporting not feeling well this date. Pt able to complete bed mobility with light HHA. Pt able to stand and complete short bout of ambulation x2 forward and backward with standard walker and seated rest between bouts. Pt still with anxiety with mobility requiring encouragement. Pt declined opportunity to sit in chair at returned to semi-supine in bed at end of session with all needs met. Treatment:  Patient practiced and was instructed in the following treatment:    Bed Mobility: VCs provided for sequencing and safety during mobility. Manual assist provided for completion of task. Transfer Training: Verbal and tactile cueing provided for sequencing and safety during mobility. Manual assist provided for completion of task  Gait Training: Ambulation with standard walker and verbal cues for proper technique and safety. Manual assist provided for completion of task     PLAN:    Patient is making good progress towards established goals. Will continue with current POC.       Time in  0730  Time out  0753    Total Treatment Time  23 minutes     CPT codes:  [] Gait training 15860 - minutes  [] Manual therapy 85846 - minutes  [x] Therapeutic activities 91040 23 minutes  [] Therapeutic exercises 15259 - minutes  [] Neuromuscular reeducation 20758 - minutes    Micah Trevino Gavino Dyer, DPT  [id number]

## 2022-07-17 NOTE — PLAN OF CARE
Problem: Chronic Conditions and Co-morbidities  Goal: Patient's chronic conditions and co-morbidity symptoms are monitored and maintained or improved  Outcome: Progressing  Flowsheets (Taken 7/17/2022 0735)  Care Plan - Patient's Chronic Conditions and Co-Morbidity Symptoms are Monitored and Maintained or Improved: Monitor and assess patient's chronic conditions and comorbid symptoms for stability, deterioration, or improvement     Problem: Discharge Planning  Goal: Discharge to home or other facility with appropriate resources  Outcome: Progressing  Flowsheets (Taken 7/17/2022 0735)  Discharge to home or other facility with appropriate resources: Identify barriers to discharge with patient and caregiver     Problem: Pain  Goal: Verbalizes/displays adequate comfort level or baseline comfort level  Outcome: Progressing     Problem: Skin/Tissue Integrity  Goal: Absence of new skin breakdown  Description: 1. Monitor for areas of redness and/or skin breakdown  2. Assess vascular access sites hourly  3. Every 4-6 hours minimum:  Change oxygen saturation probe site  4. Every 4-6 hours:  If on nasal continuous positive airway pressure, respiratory therapy assess nares and determine need for appliance change or resting period.   Outcome: Progressing

## 2022-07-17 NOTE — PROGRESS NOTES
[facility]  Internal Medicine Residency Program  Progress Note - House Team     Patient:  Modesto Farris 66 y.o. female MRN: [id number]     Date of Service: 7/17/2022     CC: Altered mental status       Subjective     Overnight events: Overnight event is significant for sore throat and respiratory viral panel was ordered and Cepacol lozenges ordered and patient improved. Hgb was stable at 7.1,  > 137 > 166. This morning patient was seen and examined and reports that she is feeling way better except for a recent dull abdominal pain which she says is a 7/10 located in the LUQ, nothing improves the pain, but the scale of the patient's pain and her physical appearance during the visit did not match. Objective     Physical Exam:  Vitals: BP (!) 112/58   Pulse 83   Temp 98.6 °F (37 °C) (Oral)   Resp 16   Ht 5' (1.524 m)   Wt 238 lb (108 kg)   SpO2 94%   BMI 46.48 kg/m²     I & O - 24hr:   Intake/Output Summary (Last 24 hours) at 7/17/2022 0932  Last data filed at 7/17/2022 0735  Gross per 24 hour   Intake 240 ml   Output 1430 ml   Net -1190 ml      General Appearance: alert, appears stated age, and cooperative  HEENT:  Head: Normocephalic, no lesions, without obvious abnormality. Head: Normal, normocephalic, atraumatic. Neck: no adenopathy, no carotid bruit, no JVD, supple, symmetrical, trachea midline, and thyroid not enlarged, symmetric, no tenderness/mass/nodules  Lung: clear to auscultation bilaterally  Heart: regular rate and rhythm, S1, S2 normal, no murmur, click, rub or gallop  Abdomen: Normal bowel sounds, tender in the LUQ, no mass present  Extremities:  1+ peripheral edema   Musculokeletal: No joint swelling, no muscle tenderness. ROM normal in all joints of extremities.    Neurologic: Mental status: Alert, oriented, thought content appropriate  Subject  Pertinent Information & Imaging Studies, Consults   genesis  CBC with Differential:    Lab Results   Component 07/06/2022 03:08 AM       IMAGING:   Imaging Studies:    XR CHEST PORTABLE    Result Date: 7/15/2022  No sizable pleural effusion. XR CHEST PORTABLE    Result Date: 7/14/2022  Mild subsegmental atelectasis in the left lower lobe. XR CHEST PORTABLE    Result Date: 7/12/2022  Grossly stable pulmonary opacities in the lower left lung, which may represent atelectasis or pneumonia. XR CHEST PORTABLE    Result Date: 7/11/2022  1. Patchy right perihilar and left lower lobe airspace disease 2. Status post removal of the endotracheal tube and NG tube 3. There is no pneumothorax. PROCEDURES: Chest tube in place          Notable Cultures:      Blood cultures   Blood Culture, Routine   Date Value Ref Range Status   07/05/2022 5 Days no growth  Final     Respiratory cultures No results found for: RESPCULTURE   Gram Stain Result   Date Value Ref Range Status   07/08/2022 Refer to ordered Gram stain for results  Final     Urine   Creatinine Ur POCT   Date Value Ref Range Status   06/17/2019 50 mg/dl  Final     Urine Culture, Routine   Date Value Ref Range Status   07/05/2022 Growth not present  Final     Legionella No results found for: LABLEGI  C Diff PCR No results found for: CDIFPCR  Wound culture/abscess: No results for input(s): WNDABS in the last 72 hours. Tip culture:No results for input(s): CXCATHTIP in the last 72 hours.      Antibiotic  Days  Day started                                  OXYGENATION: Face mask with oxygen flowing     DIET: Mince and moist due to dysphagia         Resident's Assessment and Plan     Baldemar Ca is a 66 y.o. female with  has a past medical history of A-fib ([address] Acute on chronic congestive heart failure ([address] Anxiety, Asthma, CAD (coronary artery disease), Cancer ([address] Chronic kidney disease, Depression, Diabetes mellitus ([address] H/O mammogram, Hx MRSA infection, Hyperlipidemia, Hypertension, Lateral epicondylitis, SERENA on CPAP, Parkinson's disease ([address] and Tubal ligation status. came here with CC   Chief Complaint   Patient presents with    Altered Mental Status     per ems family reports ams x 45 minutes, recent psych med changes and right toe amputation        SUMMARY [address]: Briefly, Ms. Tim Rosario is a 67 y/o F admitted for acute hypoxic hypercapnic respiratory failure and altered mental status likely secondary to pleural effusion, possible HAP and acute decompensated HFpEF exacerbation (EF 70-75%%), and septic shock likely secondary to pneumonia, hypernatremia, and lactic acidosis. She was intubated and placed in the ICU, however, was extubated after improvement on 7/10/2022. Today she reports that she is doing better, no complaint currently except throat pain over the last few hours. Currently patient is being treated with cefepime 2000 mg IV q12h for possible HAP. Patient mentioned that she had one episode of black diarrhea yesterday night and hemoglobin is dropping, FOBT is positive. Pt mentioned that she had a bowel movement yesterday evening, but cannot recall if it was bloody  or black. D/c planning started for [facility]. Asprin and Eliquis held due to recent FOBT positivity to prevent further bleeding. Days since admission: 12    Consults:   Pulmonology   Cardiology see PRN     Assessment:  Acute encephalopathy-Resolved       Altered mental status on admission       Intubated in the ICU due to AMS       Extubated 4 days ago       Currently oriented to person time and place           Plan       Continue seroquel       Hold home depakote due to its association with encephalopathy     2.  Acute hypoxic hypercapnic respiratory failure likely 2/2 to mod R     pleural effusion, possible HAP vs acute decompensated HFpEF  exacerbation   (EF 70-75%)- Resolved       Patient was seen today and is improving         Pt able is orientated to time and place with good concentration         Chest tube still in place for now         Most recent CXR reveals mild subsegmental atelectasis in the LLL        Currently on NC 2L         Plan       Coordinate with pulmonology to remove chest tube          Continue to monitor respiratory status         3. Pulmonary hypertension II 2/2 acute decompensated diastolic heart failure in the setting of SERENA        Hx of heart failure with preserved EF with last echo done 07/07/2022           Plan       Fluid restriction       Continue metoprolol     4. Moderate Oral Dysphagia         Plan      Mince and moist consistency solids     5. Moderate R pleural effusion- Resolved      Most recent CXR 07/15/2022 No sizable pleural effusion compared to       Most recent CXR 07/13/2022     Mild subsegmental atelectasis in the LLL          Plan       Continue cefepime 2000mg IV q12h       Monitor respiratory status     6. Permanent A-fib      Hx of atrial fibrillation         Plan       Continue with metoprolol      Eliquis remains on hold due to concerning of worsening HgB      7. Anemia with concerns for pancytopenia complicated by severe thrombocytopenia-Resolved       Platelet count today 212        8. Hx of Parkinson's disease      Sinemet resumed     9. Melena likely 2/2 gastritis vs stress ulcer post ICU       Pt referred to one episode of melena on 07/15/2022       She also reported nausea       FOBT performed on 07/16/2022 is positive           Plan        Consult general surgery for probable scope        Increase PPI dose to BID        Transfuse if Hb is <7         Hgb this morning 7.1          H & H q24h         Hold Eliquis and aspirin     10.  Hx of Restless leg syndrome         Pt was complaining of worsening restless leg syndrome at night          Plan         Continue Ropinirole     Problems resolved during this admission:   Acute encepalopathy multifactorial 2/2 to septic shock, digoxin toxicity, uremia, resolved  Shock, likely septic, HAP, digoxin toxicity  HAGMA 2/2 to lactic acidosis (hypoperfusion, septic shock), uremia 2/2 SHANTANU  Elevated troponin  SHANTANU stage III on CKD  Metabolic alkalosis likely contraction alkalosis          PT/OT: AM/PAC  DVT ppx: Held   GI ppx: Protonix       Next of Kin/ POA:  Daughter     Code Status:   Full code     Disposition: Continue current care       Brook Paz MD, PGY-1  Attending physician: Dr. Maria A Glover. Farzaneh Martini MD, FACP        NOTE: This report was transcribed using voice recognition software. Every effort was made to ensure accuracy; however, inadvertent computerized transcription errors may be present. Senior Resident Addendum:  I have seen and examined the patient with the intern. I have discussed the case, including pertinent history and exam findings with the intern. I agree with the assessment, plan and orders as documented above with the following additions:     Patient seen and examined at bedside in no acute distress. She was having breakfast, tolerated oral intake well. Restless leg syndrome symptoms have improved. On exam, noted to have clear breath sounds, crackles have resolved. She continues to have +1 edema on BLE. Assessment:  Acute hypoxic hypercapnic respiratory failure 2/2 mod R pleural effusion, possible HAP vs acute decompensated  HFpEF exacerbation (EF 70-75%)- IMPROVING on 2L O2  Pulmonary hypertension likely Type II 2/2 acute decompensated diastolic heart failure, in the setting of Hx SERENA- STABLE  Moderate R pleural effusion, likely 2/2 HAP (Klebsiella pneumoniae) vs acute decompensated heart failure. Chest tube insitu, 280 cc in 24 hrs - improving  HAP (Klebsiella pneumoniae on resp culture, light growth). Completed 10 days of antibiotics  Permanent atrial fibrillation, s/p DCCV (April and May 2022), now rate controlled. Acute on chronic macrocytic anemia, likely GI mucosal bleeding, melanotic stools and FOBT+  Prolonged INR- now downtrending  Hx of asthma  Hx of SERENA. Not using CPAP at home  Hx of HTN. Hx of CAD  Hx of Type 2 DM with neuropathy.  On Lantus 10 and Novolog SS at home  Hx of depression/anxiety. On seroquel at home  Hx of restless leg syndrome. On ropinirole at home; resumed 7/16  Hx of Parkinson's disease. On Sinemet at home, which was discontinued upon discharge at [address] [place] [address]:  Acute encephalopathy, multifactorial 2/2 septic shock, digoxin toxicity, uremia,resolved  Shock, likely septic (HAP), digoxin toxicity  Bradycardia, hypotension 2/2 Digoxin toxicity in the setting of SHANTANU. Digoxin level 3.7>>2  HAGMA 2/2 lactic acidosis (hypoperfusion, septic shock?),  uremia 2/2 SHANTANU, improving. Lactic acid down to 1.7  Hyperkalemia 2/2 SHANTANU, resolved. Elevated pro-BNP, multifactorial, likely acute decompensated HFpEF, septic shock. Elevated troponin likely 2/2 demand ischemia  Hypernatremia  SHANTANU Stage III on CKD multifactorial, pre-renal (hemodynamically mediated, DHN 2/2 diuretic use, poor oral intake); vs ATN 2/2 septic shock. Metabolic alkalosis likely contraction alkalosis 2/2 diuresis, poor oral intake- improving  Pancytopenia likely 2/2 BM suppression 2/2 chronic illness, meds (Zosyn)  Thrombocytopenia,likely 2/2 sepsis, less likely HIT (not exposed to heparin products), DIC, hemolysis ruled out (work-up negative)   Low grade fever, multifactorial, ? meds (Precedex?), CLABSI?, no new leukocytosis, or hemodynamic instability     Plan:  Still on 2L NC. Wean oxygen as able. Continue breathing treatments  Chest tube in place. Removal per pulmonology  Completed 10-days of antibiotics. Monitor off antibiotics. Continue to metoprolol  Continue to hold eliquis and aspirin  Continue PPI BID  Follow H/H at 1800. Transfuse if Hgb <7. Consider general surgery consult if Hgb continued to downtrend further for possible GI bleed, as patient is FOBT+ with slowly downtrending Hgb in the past 4 days. Diuresis per nephro.  Started lasix 40mg IV BID yesterday  Continue ropinirole  Eventual disposition at [facility] started. Continue current management.      Gerry Tomas MD PGY-2  7/17/2022  9:39 AM

## 2022-07-18 LAB
ALBUMIN SERPL-MCNC: 3.4 G/DL (ref 3.5–5.2)
ALP BLD-CCNC: 71 U/L (ref 35–104)
ALT SERPL-CCNC: <5 U/L (ref 0–32)
ANGLE (CLOT STRENGTH): 76.6 DEGREE (ref 59–74)
ANION GAP SERPL CALCULATED.3IONS-SCNC: 6 MMOL/L (ref 7–16)
APTT: 26.7 SEC (ref 24.5–35.1)
AST SERPL-CCNC: 8 U/L (ref 0–31)
BASOPHILS ABSOLUTE: 0.03 E9/L (ref 0–0.2)
BASOPHILS RELATIVE PERCENT: 0.5 % (ref 0–2)
BILIRUB SERPL-MCNC: 0.9 MG/DL (ref 0–1.2)
BUN BLDV-MCNC: 21 MG/DL (ref 6–23)
CALCIUM SERPL-MCNC: 8.4 MG/DL (ref 8.6–10.2)
CHLORIDE BLD-SCNC: 100 MMOL/L (ref 98–107)
CO2: 33 MMOL/L (ref 22–29)
CREAT SERPL-MCNC: 1.1 MG/DL (ref 0.5–1)
EOSINOPHILS ABSOLUTE: 0.1 E9/L (ref 0.05–0.5)
EOSINOPHILS RELATIVE PERCENT: 1.7 % (ref 0–6)
EPL-TEG: 0.4 % (ref 0–15)
G-TEG: 11.8 K D/SC (ref 4.5–11)
GFR AFRICAN AMERICAN: 59
GFR NON-AFRICAN AMERICAN: 49 ML/MIN/1.73
GLUCOSE BLD-MCNC: 114 MG/DL (ref 74–99)
HCT VFR BLD CALC: 27.4 % (ref 34–48)
HCT VFR BLD CALC: 27.5 % (ref 34–48)
HEMOGLOBIN: 8.1 G/DL (ref 11.5–15.5)
HEMOGLOBIN: 8.3 G/DL (ref 11.5–15.5)
IMMATURE GRANULOCYTES #: 0.07 E9/L
IMMATURE GRANULOCYTES %: 1.2 % (ref 0–5)
INR BLD: 1.3
K (CLOTTING TIME): 1 MIN (ref 1–3)
LY30 (FIBRINOLYSIS): 0.4 % (ref 0–8)
LYMPHOCYTES ABSOLUTE: 0.95 E9/L (ref 1.5–4)
LYMPHOCYTES RELATIVE PERCENT: 16.4 % (ref 20–42)
MA (MAX AMPLITUDE): 70.3 MM (ref 50–70)
MAGNESIUM: 1.8 MG/DL (ref 1.6–2.6)
MCH RBC QN AUTO: 27.7 PG (ref 26–35)
MCHC RBC AUTO-ENTMCNC: 30.2 % (ref 32–34.5)
MCV RBC AUTO: 91.7 FL (ref 80–99.9)
METER GLUCOSE: 112 MG/DL (ref 74–99)
METER GLUCOSE: 122 MG/DL (ref 74–99)
METER GLUCOSE: 175 MG/DL (ref 74–99)
METER GLUCOSE: 191 MG/DL (ref 74–99)
MONOCYTES ABSOLUTE: 0.47 E9/L (ref 0.1–0.95)
MONOCYTES RELATIVE PERCENT: 8.1 % (ref 2–12)
NEUTROPHILS ABSOLUTE: 4.18 E9/L (ref 1.8–7.3)
NEUTROPHILS RELATIVE PERCENT: 72.1 % (ref 43–80)
PDW BLD-RTO: 16.7 FL (ref 11.5–15)
PHOSPHORUS: 2.3 MG/DL (ref 2.5–4.5)
PLATELET # BLD: 227 E9/L (ref 130–450)
PMV BLD AUTO: 11.1 FL (ref 7–12)
POTASSIUM SERPL-SCNC: 3.6 MMOL/L (ref 3.5–5)
PROTHROMBIN TIME: 14.2 SEC (ref 9.3–12.4)
R (REACTION TIME): 3.1 MIN (ref 5–10)
RBC # BLD: 3 E12/L (ref 3.5–5.5)
SODIUM BLD-SCNC: 139 MMOL/L (ref 132–146)
TOTAL PROTEIN: 5.5 G/DL (ref 6.4–8.3)
WBC # BLD: 5.8 E9/L (ref 4.5–11.5)

## 2022-07-18 PROCEDURE — 85610 PROTHROMBIN TIME: CPT

## 2022-07-18 PROCEDURE — 85014 HEMATOCRIT: CPT

## 2022-07-18 PROCEDURE — 82962 GLUCOSE BLOOD TEST: CPT

## 2022-07-18 PROCEDURE — 36415 COLL VENOUS BLD VENIPUNCTURE: CPT

## 2022-07-18 PROCEDURE — 85018 HEMOGLOBIN: CPT

## 2022-07-18 PROCEDURE — 6370000000 HC RX 637 (ALT 250 FOR IP): Performed by: INTERNAL MEDICINE

## 2022-07-18 PROCEDURE — 85384 FIBRINOGEN ACTIVITY: CPT

## 2022-07-18 PROCEDURE — 6360000002 HC RX W HCPCS: Performed by: INTERNAL MEDICINE

## 2022-07-18 PROCEDURE — 6370000000 HC RX 637 (ALT 250 FOR IP)

## 2022-07-18 PROCEDURE — 97535 SELF CARE MNGMENT TRAINING: CPT

## 2022-07-18 PROCEDURE — 94640 AIRWAY INHALATION TREATMENT: CPT

## 2022-07-18 PROCEDURE — 2580000003 HC RX 258: Performed by: STUDENT IN AN ORGANIZED HEALTH CARE EDUCATION/TRAINING PROGRAM

## 2022-07-18 PROCEDURE — 85347 COAGULATION TIME ACTIVATED: CPT

## 2022-07-18 PROCEDURE — S5553 INSULIN LONG ACTING 5 U: HCPCS | Performed by: INTERNAL MEDICINE

## 2022-07-18 PROCEDURE — 83735 ASSAY OF MAGNESIUM: CPT

## 2022-07-18 PROCEDURE — 2500000003 HC RX 250 WO HCPCS: Performed by: STUDENT IN AN ORGANIZED HEALTH CARE EDUCATION/TRAINING PROGRAM

## 2022-07-18 PROCEDURE — 80053 COMPREHEN METABOLIC PANEL: CPT

## 2022-07-18 PROCEDURE — C9113 INJ PANTOPRAZOLE SODIUM, VIA: HCPCS | Performed by: STUDENT IN AN ORGANIZED HEALTH CARE EDUCATION/TRAINING PROGRAM

## 2022-07-18 PROCEDURE — 2700000000 HC OXYGEN THERAPY PER DAY

## 2022-07-18 PROCEDURE — 85576 BLOOD PLATELET AGGREGATION: CPT

## 2022-07-18 PROCEDURE — 6360000002 HC RX W HCPCS: Performed by: STUDENT IN AN ORGANIZED HEALTH CARE EDUCATION/TRAINING PROGRAM

## 2022-07-18 PROCEDURE — 99232 SBSQ HOSP IP/OBS MODERATE 35: CPT | Performed by: INTERNAL MEDICINE

## 2022-07-18 PROCEDURE — 6370000000 HC RX 637 (ALT 250 FOR IP): Performed by: STUDENT IN AN ORGANIZED HEALTH CARE EDUCATION/TRAINING PROGRAM

## 2022-07-18 PROCEDURE — 97530 THERAPEUTIC ACTIVITIES: CPT

## 2022-07-18 PROCEDURE — 85730 THROMBOPLASTIN TIME PARTIAL: CPT

## 2022-07-18 PROCEDURE — 2580000003 HC RX 258: Performed by: INTERNAL MEDICINE

## 2022-07-18 PROCEDURE — 2060000000 HC ICU INTERMEDIATE R&B

## 2022-07-18 PROCEDURE — A4216 STERILE WATER/SALINE, 10 ML: HCPCS | Performed by: STUDENT IN AN ORGANIZED HEALTH CARE EDUCATION/TRAINING PROGRAM

## 2022-07-18 PROCEDURE — 84100 ASSAY OF PHOSPHORUS: CPT

## 2022-07-18 PROCEDURE — 85025 COMPLETE CBC W/AUTO DIFF WBC: CPT

## 2022-07-18 PROCEDURE — P9047 ALBUMIN (HUMAN), 25%, 50ML: HCPCS | Performed by: INTERNAL MEDICINE

## 2022-07-18 RX ORDER — POTASSIUM CHLORIDE 20 MEQ/1
40 TABLET, EXTENDED RELEASE ORAL ONCE
Status: COMPLETED | OUTPATIENT
Start: 2022-07-18 | End: 2022-07-18

## 2022-07-18 RX ORDER — ALBUMIN (HUMAN) 12.5 G/50ML
25 SOLUTION INTRAVENOUS ONCE
Status: COMPLETED | OUTPATIENT
Start: 2022-07-18 | End: 2022-07-19

## 2022-07-18 RX ORDER — FUROSEMIDE 10 MG/ML
80 INJECTION INTRAMUSCULAR; INTRAVENOUS ONCE
Status: COMPLETED | OUTPATIENT
Start: 2022-07-18 | End: 2022-07-18

## 2022-07-18 RX ADMIN — INSULIN GLARGINE-YFGN 5 UNITS: 100 INJECTION, SOLUTION SUBCUTANEOUS at 21:03

## 2022-07-18 RX ADMIN — POTASSIUM CHLORIDE 40 MEQ: 1500 TABLET, EXTENDED RELEASE ORAL at 11:21

## 2022-07-18 RX ADMIN — CARBIDOPA AND LEVODOPA 2 TABLET: 25; 100 TABLET, EXTENDED RELEASE ORAL at 14:25

## 2022-07-18 RX ADMIN — AMIODARONE HYDROCHLORIDE 200 MG: 200 TABLET ORAL at 08:42

## 2022-07-18 RX ADMIN — BUDESONIDE 500 MCG: 0.5 SUSPENSION RESPIRATORY (INHALATION) at 20:02

## 2022-07-18 RX ADMIN — ROPINIROLE HYDROCHLORIDE 1 MG: 1 TABLET, FILM COATED ORAL at 14:25

## 2022-07-18 RX ADMIN — METOPROLOL TARTRATE 50 MG: 50 TABLET, FILM COATED ORAL at 08:42

## 2022-07-18 RX ADMIN — IPRATROPIUM BROMIDE AND ALBUTEROL SULFATE 1 AMPULE: .5; 2.5 SOLUTION RESPIRATORY (INHALATION) at 20:02

## 2022-07-18 RX ADMIN — IPRATROPIUM BROMIDE AND ALBUTEROL SULFATE 1 AMPULE: .5; 2.5 SOLUTION RESPIRATORY (INHALATION) at 13:00

## 2022-07-18 RX ADMIN — PANTOPRAZOLE SODIUM 40 MG: 40 INJECTION, POWDER, FOR SOLUTION INTRAVENOUS at 11:21

## 2022-07-18 RX ADMIN — QUETIAPINE FUMARATE 25 MG: 25 TABLET ORAL at 21:05

## 2022-07-18 RX ADMIN — INSULIN LISPRO 1 UNITS: 100 INJECTION, SOLUTION INTRAVENOUS; SUBCUTANEOUS at 21:04

## 2022-07-18 RX ADMIN — SODIUM CHLORIDE, PRESERVATIVE FREE 10 ML: 5 INJECTION INTRAVENOUS at 08:42

## 2022-07-18 RX ADMIN — PANTOPRAZOLE SODIUM 40 MG: 40 INJECTION, POWDER, FOR SOLUTION INTRAVENOUS at 23:49

## 2022-07-18 RX ADMIN — ALBUMIN (HUMAN) 25 G: 0.25 INJECTION, SOLUTION INTRAVENOUS at 23:55

## 2022-07-18 RX ADMIN — ATORVASTATIN CALCIUM 20 MG: 20 TABLET, FILM COATED ORAL at 08:42

## 2022-07-18 RX ADMIN — SODIUM CHLORIDE, PRESERVATIVE FREE 10 ML: 5 INJECTION INTRAVENOUS at 21:05

## 2022-07-18 RX ADMIN — ROPINIROLE HYDROCHLORIDE 1 MG: 1 TABLET, FILM COATED ORAL at 08:42

## 2022-07-18 RX ADMIN — FUROSEMIDE 40 MG: 10 INJECTION, SOLUTION INTRAMUSCULAR; INTRAVENOUS at 08:42

## 2022-07-18 RX ADMIN — IPRATROPIUM BROMIDE AND ALBUTEROL SULFATE 1 AMPULE: .5; 2.5 SOLUTION RESPIRATORY (INHALATION) at 09:32

## 2022-07-18 RX ADMIN — CARBIDOPA AND LEVODOPA 2 TABLET: 25; 100 TABLET, EXTENDED RELEASE ORAL at 08:42

## 2022-07-18 RX ADMIN — POTASSIUM PHOSPHATE, MONOBASIC AND POTASSIUM PHOSPHATE, DIBASIC 15 MMOL: 224; 236 INJECTION, SOLUTION, CONCENTRATE INTRAVENOUS at 12:18

## 2022-07-18 RX ADMIN — FUROSEMIDE 80 MG: 10 INJECTION, SOLUTION INTRAMUSCULAR; INTRAVENOUS at 23:49

## 2022-07-18 RX ADMIN — POTASSIUM CHLORIDE 40 MEQ: 20 TABLET, EXTENDED RELEASE ORAL at 23:44

## 2022-07-18 RX ADMIN — IPRATROPIUM BROMIDE AND ALBUTEROL SULFATE 1 AMPULE: .5; 2.5 SOLUTION RESPIRATORY (INHALATION) at 16:59

## 2022-07-18 RX ADMIN — QUETIAPINE FUMARATE 25 MG: 25 TABLET ORAL at 08:42

## 2022-07-18 RX ADMIN — Medication 5 MG: at 21:04

## 2022-07-18 RX ADMIN — ROPINIROLE HYDROCHLORIDE 1 MG: 1 TABLET, FILM COATED ORAL at 21:04

## 2022-07-18 RX ADMIN — METOPROLOL TARTRATE 50 MG: 50 TABLET, FILM COATED ORAL at 21:04

## 2022-07-18 RX ADMIN — CARBIDOPA AND LEVODOPA 2 TABLET: 25; 100 TABLET, EXTENDED RELEASE ORAL at 21:04

## 2022-07-18 RX ADMIN — INSULIN LISPRO 1 UNITS: 100 INJECTION, SOLUTION INTRAVENOUS; SUBCUTANEOUS at 16:56

## 2022-07-18 RX ADMIN — BUDESONIDE 500 MCG: 0.5 SUSPENSION RESPIRATORY (INHALATION) at 09:32

## 2022-07-18 NOTE — PROGRESS NOTES
[facility]  Department of Internal Medicine  Division of Pulmonary, Critical Care and Sleep Medicine  Progress Note    Jamie Beckett DO, MPH, [facility], [address] [facility] [address] CR[place]TREE MD      Patient: Lenny Dorsey  MRN: [id number]  : 1950    Encounter Time: 5:30 PM     Date of Admission: 2022 12:25 PM    Primary Care Physician: No primary care provider on file. Reason for Consultation: Drain CT management     SUBJECTIVE:    [facility] this drain make no sense if she has only HFpEF  Lets resent the fluid for complete analysis     OBJECTIVE:     PHYSICAL EXAM:   VITALS:   Vitals:    22 2111 22 0800 22 1300 22 1645   BP: (!) 100/58 124/65  (!) 96/52   Pulse: 72 76 83 71   Resp: 16 16 16 20   Temp: 98.3 °F (36.8 °C) 98.1 °F (36.7 °C)  97.8 °F (36.6 °C)   TempSrc: Oral Oral  Oral   SpO2: 95% 100% 97%    Weight:       Height:            Intake/Output Summary (Last 24 hours) at 2022 1730  Last data filed at 2022 1412  Gross per 24 hour   Intake 240 ml   Output 600 ml   Net -360 ml          CONSTITUTIONAL:    Alert  SKIN:     No rash,   HEENT:     EOMI, MMM, No thrush  NECK:    No bruits, No JVP appreciated  CV:      Sinus,  No murmur, No rubs, No gallops  PULMONARY:   Couse BS,  No Wheezing, No Rales, No Rhonchi      CT noted  ABDOMEN:     Soft, non-tender. BS normal. No R/R/G  EXT:    No deformities . No clubbing. Edema ower extremity edema, No venous stasis  PULSE:   Appears equal and palpable.   PSYCHIATRIC:  Seems appropriate, No acute psychosis  MS:    Gross weakness  NEUROLOGIC:   The clinical assessment is non-focal     DATA: IMAGING & TESTING:     LABORATORY TESTS:    CBC:   Lab Results   Component Value Date/Time    WBC 5.8 2022 05:39 AM    RBC 3.00 2022 05:39 AM    HGB 8.3 2022 05:39 AM    HCT 27.5 2022 05:39 AM    MCV 91.7 2022 05:39 AM    MCH 27.7 2022 05:39 AM MCHC 30.2 07/18/2022 05:39 AM    RDW 16.7 07/18/2022 05:39 AM     07/18/2022 05:39 AM    MPV 11.1 07/18/2022 05:39 AM     CMP:    Lab Results   Component Value Date/Time     07/18/2022 05:39 AM    K 3.6 07/18/2022 05:39 AM    K 5.0 07/06/2022 02:41 AM     07/18/2022 05:39 AM    CO2 33 07/18/2022 05:39 AM    BUN 21 07/18/2022 05:39 AM    CREATININE 1.1 07/18/2022 05:39 AM    GFRAA 59 07/18/2022 05:39 AM    LABGLOM 49 07/18/2022 05:39 AM    GLUCOSE 114 07/18/2022 05:39 AM    PROT 5.5 07/18/2022 05:39 AM    LABALBU 3.4 07/18/2022 05:39 AM    CALCIUM 8.4 07/18/2022 05:39 AM    BILITOT 0.9 07/18/2022 05:39 AM    ALKPHOS 71 07/18/2022 05:39 AM    AST 8 07/18/2022 05:39 AM    ALT <5 07/18/2022 05:39 AM     Calcium:    Lab Results   Component Value Date/Time    CALCIUM 8.4 07/18/2022 05:39 AM     Ionized Calcium:  No results found for: IONCA  Magnesium:    Lab Results   Component Value Date/Time    MG 1.8 07/18/2022 05:39 AM     Phosphorus:    Lab Results   Component Value Date/Time    PHOS 2.3 07/18/2022 05:39 AM     PT/INR:    Lab Results   Component Value Date/Time    PROTIME 14.2 07/18/2022 05:39 AM    INR 1.3 07/18/2022 05:39 AM        PRO-BNP:   Lab Results   Component Value Date    PROBNP 37,248 (H) 07/12/2022    PROBNP 41,368 (H) 07/06/2022      ABGs:   Lab Results   Component Value Date/Time    PH 7.452 07/11/2022 05:07 AM    PO2 133.6 07/11/2022 05:07 AM    PCO2 40.0 07/11/2022 05:07 AM     Hemoglobin A1C: No components found for: HGBA1C    IMAGING:  Imaging tests were completed and reviewed and discussed radiology and care team involved and reveals   Echo Complete    Result Date: 7/7/2022  Findings   Left Ventricle  Micro-bubble contrast injected to enhance left ventricular visualization. Normal left ventricular size and systolic function. Ejection fraction is visually estimated at 70-75%. Indeterminate diastolic function. No regional wall motion abnormalities seen.   Mild left ventricular concentric hypertrophy noted. Right Ventricle  Moderately dilated right ventricle. Right ventricle global systolic function is reduced. TAPSE 1.1 cm. Left Atrium  The left atrium is moderate-severely dilated. JUANY 48 ml/m2. The interatrial septum appears intact. Right Atrium  Mildly enlarged right atrium. Mitral Valve  Structurally normal mitral valve. No evidence of mitral valve stenosis. Physiologic mitral regurgitation. Tricuspid Valve  The tricuspid valve appears structurally normal.  Mild tricuspid regurgitation. RVSP is at least 60 mmHg. Aortic Valve  Individual aortic valve leaflets are not clearly visualized. No hemodynamically significant aortic stenosis is present. No evidence of aortic valve regurgitation. Pulmonic Valve  The pulmonic valve was not well visualized. No evidence of pulmonic valve stenosis. No evidence of any pulmonic regurgitation. Pericardial Effusion  Prominent pericardial fat pad. No definitive evidence of pericardial effusion. Aorta  Aortic root dimension within normal limits. Miscellaneous  Dilated Inferior Vena Cava. Conclusions   Summary  Technically difficult study - limited visualization. Micro-bubble contrast injected to enhance left ventricular visualization. Normal left ventricular size and systolic function. Ejection fraction is visually estimated at 70-75%. Indeterminate diastolic function. No regional wall motion abnormalities seen. Mild left ventricular concentric hypertrophy noted. Moderately dilated right ventricle with reduced function. Biatrial dilation. Mild tricuspid regurgitation. RVSP is at least 60 mmHg. Prominent pericardial fat pad. No definitive evidence of pericardial effusion. Result Date: 7/5/2022  FINDINGS: Lower Chest: See the CT report of the chest dated the same date for full details. Organs: The liver is grossly unremarkable. The spleen is unremarkable.  Gallbladder is been surgically removed. The pancreas reveals some mild fatty replacement. Both adrenal glands are within normal limits. The kidneys are unremarkable in appearance. GI/Bowel: Stomach is within normal limits. No mucosal abnormality. Stool seen scattered diffusely throughout the colon. There is no wall thickening. No mucosal abnormality or distension of the small bowel. Pelvis: Pelvic organs reveal mild wall thickening of the bladder with incomplete distension. The uterus is grossly unremarkable. Peritoneum/Retroperitoneum: There is no abdominal retroperitoneal lymphadenopathy. There is mild stranding identified in the root of the mesentery as well as surrounding the pancreas in which mild inflammation of the pancreas cannot be completely excluded. Correlation to clinical lab values is recommended. No pelvic adenopathy with moderate atherosclerotic disease seen within the abdominal aorta and iliac vessels. Free fluid identified throughout the upper abdomen. Extensive vascular calcifications seen within the mesenteric vessels as well as the abdominal aorta. Bones/Soft Tissues: The bony structures reveal extensive degenerative changes seen within the spine and pelvis. Severe degenerative changes seen within the sacroiliac joints bilaterally right greater than left some sclerosis on the right to suggest prior healed sacroiliitis. There is extensive anasarca seen within the subcutaneous tissues. Increasing fluid within the abdomen when compared to the prior study. The anasarca is also increased in appearance of the prior examination. Mild stranding seen around the mesentery which correlation to clinical lab values is recommended to exclude possible pancreatitis or volume overload. Overall the appearance of the pancreas itself is grossly unremarkable. There is only mild stranding seen throughout the mesenteric root.      CT HEAD WO CONTRAST  Result Date: 7/5/2022  No acute intracranial abnormality or significant change in the overall appearance of the brain. MRI would be useful if symptoms persist. RECOMMENDATIONS: Unavailable     CT CHEST WO CONTRAST    Result Date: 7/5/2022  FINDINGS: Mediastinum: The cardiac size is enlarged. There is a small pericardial effusion. Minimal probable gas in the right atrium is likely secondary to recent intravenous injection. The esophagus is normal.  No definite mediastinal masses or lymphadenopathy. Lungs/pleura: There is consolidation posterior right upper lobe and right middle lobe with linear density in the lingula and at the left posterior lung base. There is right lower lobe consolidation and moderate right pleural effusion. Upper Abdomen: There is a small volume of right upper quadrant ascites. There are surgical clips in the gallbladder fossa and right upper anterior abdominal wall. Soft Tissues/Bones: There is mild induration of the subcutaneous fat. There is moderate lower thoracic spondylosis with slight levoconvex curvature of the thoracic spine. No definite lytic or blastic osseous lesions. Bilateral lung infiltrates with greatest consolidation right lower lobe probably secondary to atelectasis and or pneumonia. Moderate right pleural effusion. Cardiomegaly and small pericardial effusion. The pericardial effusion is new. Small volume right upper abdominal ascites along with anasarca. This is new. CT GUIDED CHEST TUBE  Result Date: 7/8/2022  [facility] After obtaining informed consent, following the routine sterile prep and drape and after the administration of local anesthesia a needle was inserted into the right pleural space . Serous fluid was aspirated. Subsequently a guidewire was passed through the needle. Subsequently the needle was removed and over the guidewire the tract was dilated. Following dilatation a locking loop catheter was placed. Post procedure CT scan reveals the tube to be in good position. Specimen was sent to the laboratory.  The patient tolerated the procedure well. There were no complications. Prior to the procedure a timeout occurred at  1542 hours. The patient received 9 second  of  fluoroscopy. The patient received local anesthesia. The patient was monitored for 60 minutes by a registered nurse. Successful uncomplicated  right chest tube placement Recommendations: 1. Follow-up tube check in 2 weeks 2. Flush tube daily with 5 to 10 mL of sterile saline     MRI BRAIN WO CONTRAST  Result Date: 7/8/2022  FINDINGS: INTRACRANIAL STRUCTURES/VENTRICLES: There is no acute infarct. No mass effect, edema or hemorrhage is seen. Mild volume loss is seen in the cerebrum with mild chronic microvascular ischemic changes. No hydrocephalus or extra-axial fluid is seen. ORBITS: Prosthetic lenses are seen in the globes bilaterally. The orbits are otherwise grossly unremarkable. SINUSES: Mild mucosal thickening in the paranasal sinuses. There is pooling of secretions in the nasopharynx. Moderate bilateral mastoid effusions. BONES/SOFT TISSUES: The bone marrow signal intensity appears normal. The soft tissues demonstrate no acute abnormality. 1.  No acute intracranial abnormality. 2. No gross epileptogenic lesion is identified. 3. Bilateral mastoid effusions, which may be due to eustachian tube dysfunction or otomastoiditis. RECOMMENDATIONS: Unavailable     US DUP LOWER EXTREMITIES BILATERAL VENOUS  Within the visualized vessels there is no evidence for deep venous thrombosis       Assessment: 1. Acute hypoxic hypercapnic respiratory failure 2/2 mod R pleural effusion, possible HAP vs acute decompensated  HFpEF exacerbation (EF 70-75%)  2. Pulmonary hypertension likely Type II 2/2 acute decompensated diastolic heart failure, in the setting of Hx SERENA  3. Moderate R pleural effusion, likely 2/2 HAP (Klebsiella pneumoniae) vs acute decompensated heart failure. D4 CT, 250 cc output in 24 hrs  4. HAP (Klebsiella pneumoniae on resp culture, light growth). D1 cefepime  5. Elevated pro-BNP, multifactorial, likely acute decompensated HFpEF, septic shock. 6. Elevated troponin likely 2/2 demand ischemia  7. Permanent atrial fibrillation, s/p DCCV (April and May 2022), now rate controlled. 8. SHANTANU Stage III on CKD multifactorial, pre-renal (hemodynamically mediated, DHN 2/2 diuretic use, poor oral intake); vs ATN 2/2 septic shock. 9. Hypernatremia likely 2/2 over diuresis  10. Metabolic alkalosis likely contraction alkalosis 2/2 diuresis, poor oral intake  11. Pancytopenia likely 2/2 BM suppression 2/2 chronic illness, meds (Zosyn)  12. Acute on chronic macrocytic anemia, multifactorial, likely 2/2 chronic disease, blood draws,  Less likely hemolysis (no schistocytes, haptoglobin wnl) s/p 1 unit PRBC 7/11, FOBT positive today, with pinkish tinge on chest tube output  13. Thrombocytopenia,likely 2/2 #11, less likely HIT (not exposed to heparin products), DIC, hemolysis ruled out (work-up negative)  14. Prolonged INR, ? HFP wnl, no overt bleeding, apixaban and aspirin on hold. Given Vit K yesterday for INR 3.2>2 today  15. Low grade fever, multifactorial, ? meds (Precedex?), CLABSI?, no new leukocytosis, or hemodynamic instability   16. Hx of asthma  17. Hx of SEREAN. Not using CPAP at home  18. Hx of HTN. -160`s  19. Hx of CAD  20. Hx of Type 2 DM with neuropathy. On Lantus 10 and Novolog SS at home  21. Hx of depression/anxiety. 22. Hx of restless leg syndrome  23. Hx of Parkinson's disease.  On Sinemet and ropinirole at home, which was discontinued upon discharge at [address]:   Wean oxygen  Recehck all the fluid analysis from the chest tube            Moreno Silva DO DO, MPH, [facility]  Professor of Internal Medicine  Pulmonary, Critical Care and Sleep Medicine

## 2022-07-18 NOTE — CONSULTS
DIAGNOSTIC  7/19/15    GALLBLADDER SURGERY      LUMBAR LAMINECTOMY      TOE AMPUTATION      TONSILLECTOMY      UPPER GASTROINTESTINAL ENDOSCOPY         Medications Prior to Admission    Prior to Admission medications    Medication Sig Start Date End Date Taking? Authorizing Provider   divalproex (DEPAKOTE) 250 MG DR tablet Take 250 mg by mouth 2 times daily   Yes Historical Provider, MD   QUEtiapine (SEROQUEL) 50 MG tablet Take 50 mg by mouth nightly   Yes Historical Provider, MD   QUEtiapine (SEROQUEL) 25 MG tablet Take 25 mg by mouth every morning   Yes Historical Provider, MD   metoprolol tartrate (LOPRESSOR) 50 MG tablet Take 100 mg by mouth 2 times daily   Yes Historical Provider, MD   rOPINIRole (REQUIP) 1 MG tablet Take 1 mg by mouth 3 times daily   Yes Historical Provider, MD   linezolid (ZYVOX) 600 MG tablet Take 600 mg by mouth 2 times daily   Yes Historical Provider, MD   cephALEXin (KEFLEX) 500 MG capsule Take 500 mg by mouth 3 times daily   Yes Historical Provider, MD   furosemide (LASIX) 20 MG tablet Take 20 mg by mouth daily   Yes Historical Provider, MD   digoxin (LANOXIN) 125 MCG tablet Take 125 mcg by mouth daily   Yes Historical Provider, MD   ferrous sulfate (IRON 325) 325 (65 Fe) MG tablet Take 325 mg by mouth daily (with breakfast)  Patient not taking: Reported on 7/7/2022    Historical Provider, MD   aspirin EC 81 MG EC tablet Take 1 tablet by mouth daily 5/17/22   Marcella Swanson MD   apixaban (ELIQUIS) 5 MG TABS tablet Take 1 tablet by mouth 2 times daily 4/19/22   Anuel Alas MD   montelukast (SINGULAIR) 10 MG tablet Take 10 mg by mouth nightly    Historical Provider, MD   omeprazole (PRILOSEC) 40 MG delayed release capsule Take 40 mg by mouth daily     Historical Provider, MD   blood glucose test strips (ACCU-CHEK RIGOBERTO PLUS) strip 1 each by In Vitro route 2 times daily Indications: DISP ACCU-CHEK As needed.     Historical Provider, MD   atorvastatin (LIPITOR) 20 MG tablet Take 1

## 2022-07-18 NOTE — PROGRESS NOTES
Comprehensive Nutrition Assessment    Type and Reason for Visit:  Reassess    Nutrition Recommendations/Plan:   Continue NPO, ADAT once med appropriate. Will Start ONS once diet adv and monitor. Malnutrition Assessment:  Malnutrition Status: At risk for malnutrition (Comment) (07/11/22 1301)    Context:  Acute Illness     Findings of the 6 clinical characteristics of malnutrition:  Energy Intake:  50% or less of estimated energy requirements for 5 or more days  Weight Loss:  Unable to assess (d/t wt fluctuation hx)     Body Fat Loss:  No significant body fat loss     Muscle Mass Loss:  No significant muscle mass loss    Fluid Accumulation:  No significant fluid accumulation     Strength:  Not Performed    Nutrition Assessment:    Pt remains at risk d/t ongoing need for ICU care. Admit w/ Septic shock/Acute encephalopathy 2/2 PNA, +SHANTANU/CKD, +Rt Pleural effusion s/p CT placement 7/8, s/p extubation 7/10, +Mod Dysphagia s/p MBS 7/11. Noted GIB w/ pend plan for possible c-scope/EGD soon. PMHx DM, CHF, CAD, CKD, CA, Parkinson's, s/p recent Rt 2nd toe amp. Pt currently NPO for possible GI w/up. Noted sporadic poor katy/intake since placed on diet. Will Start ONS once diet adv and monitor. Nutrition Related Findings:    A&O, poor dentition, Abd/BS WDL, +diarrhea, trace edema, -I/O's Wound Type: Multiple, Open Wounds (toes x 2)       Current Nutrition Intake & Therapies:    Average Meal Intake: 26-50%, NPO (sporadic intake at times)  Average Supplements Intake: NPO  Diet NPO    Anthropometric Measures:  Height: 5' (152.4 cm)  Ideal Body Weight (IBW): 100 lbs (45 kg)    Admission Body Weight: 234 lb (106.1 kg) (7/8 first measured)  Current Body Weight: 234 lb (106.1 kg) (7/8 adm wt as CBW slightly elevated still), 231 % IBW.  Weight Source: Bed Scale  Current BMI (kg/m2): 45.7  Usual Body Weight:  (variable EMR measured wt ranges 219-244lb x 6 mon back)  % Weight Change (Calculated): 1.8  Weight Adjustment For: No Adjustment                 BMI Categories: Obese Class 3 (BMI 40.0 or greater)    Estimated Daily Nutrient Needs:  Energy Requirements Based On: Formula  Weight Used for Energy Requirements: Admission  Energy (kcal/day):   Weight Used for Protein Requirements: Ideal  Protein (g/day): 1.8-2.2 gm/kg IBW= ; as tolerated w/ SHANTANU/CKD  Method Used for Fluid Requirements: Other (Comment)  Fluid (ml/day): per critical care mgmt    Nutrition Diagnosis:   Inadequate oral intake related to swallowing difficulty (2/2 Dysphagia w/ poor appetite at times) as evidenced by NPO or clear liquid status due to medical condition, intake 26-50%, swallow study results    Nutrition Interventions:   Food and/or Nutrient Delivery: Continue NPO, Start Oral Diet, Start Oral Nutrition Supplement (Continue NPO, ADAT once med appropriate. Will Start ONS once diet adv and monitor.)  Nutrition Education/Counseling: No recommendation at this time  Coordination of Nutrition Care: Continue to monitor while inpatient       Goals:  Previous Goal Met: Progressing toward Goal(s)  Goals: Initiate PO diet, within 2 days  Specify Other Goals: Nutrition Progression- PO diet vs EN    Nutrition Monitoring and Evaluation:   Behavioral-Environmental Outcomes: None Identified  Food/Nutrient Intake Outcomes: Diet Advancement/Tolerance, Food and Nutrient Intake, Supplement Intake  Physical Signs/Symptoms Outcomes: Biochemical Data, Chewing or Swallowing, Diarrhea, GI Status, Fluid Status or Edema, Hemodynamic Status, Nutrition Focused Physical Findings, Skin, Weight    Discharge Planning:     Too soon to determine     Deborra BRAULIO Quach, LD  Contact: ext [phone]

## 2022-07-18 NOTE — PROGRESS NOTES
[facility]  Internal Medicine Residency Program  Progress Note - House Team     Patient:  Mona Kinney 66 y.o. female MRN: [id number]     Date of Service: 7/18/2022     CC: altered mental status    Days since admission: 13    Subjective     Overnight events:   Patient's Hb dropped to 6.7 and received 1 unit of pRBC. Post H/H Hb is at 8.3.    > 122  TEG ordered, after reviewing the results results no intervention is necessary. Patient is NPO at this time in concern of GI bleed       Patient is anxious about possibly having to get an EGD. Other than that she is in good spirits. Her mentation is good and she has no complains of chest pain, palpitations, shortness of breath or abdominal pain. She claims to have had no tarry stools. Objective     Physical Exam:  Vitals: /65   Pulse 76   Temp 98.1 °F (36.7 °C) (Oral)   Resp 16   Ht 5' (1.524 m)   Wt 238 lb (108 kg)   SpO2 100%   BMI 46.48 kg/m²     I & O - 24hr:   Intake/Output Summary (Last 24 hours) at 7/18/2022 1130  Last data filed at 7/18/2022 4172  Gross per 24 hour   Intake 360 ml   Output 850 ml   Net -490 ml      General Appearance: alert, appears stated age, and cooperative  HEENT:  Head: Normocephalic, no lesions, without obvious abnormality. Neck: no JVD  Lung: clear to auscultation bilaterally  Heart: regular rate and rhythm, S1, S2 normal, no murmur, click, rub or gallop  Abdomen: soft but painful upon light palpitation in the epigastric and RUQ region. Bowel sounds are heard. Extremities:  extremities normal, atraumatic, no cyanosis or edema  Musculokeletal: No joint swelling, no muscle tenderness. ROM normal in all joints of extremities.    Neurologic: Mental status: Alert, oriented, thought content appropriate  Chest tube:  water seal, draining 400ml  Subjective  Pertinent Information & Imaging Studies, Consults     CBC with Differential:    Lab Results   Component Value Date/Time    WBC 5.8 07/18/2022 05:39 AM    RBC 3.00 07/18/2022 05:39 AM    HGB 8.3 07/18/2022 05:39 AM    HCT 27.5 07/18/2022 05:39 AM     07/18/2022 05:39 AM    MCV 91.7 07/18/2022 05:39 AM    MCH 27.7 07/18/2022 05:39 AM    MCHC 30.2 07/18/2022 05:39 AM    RDW 16.7 07/18/2022 05:39 AM    NRBC 0.0 03/15/2019 09:10 AM    SEGSPCT 74 08/20/2013 10:45 AM    METASPCT 0.9 07/14/2022 05:31 AM    LYMPHOPCT 16.4 07/18/2022 05:39 AM    MONOPCT 8.1 07/18/2022 05:39 AM    MYELOPCT 2.6 07/14/2022 05:31 AM    EOSPCT 1 06/16/2017 12:00 AM    BASOPCT 0.5 07/18/2022 05:39 AM    MONOSABS 0.47 07/18/2022 05:39 AM    LYMPHSABS 0.95 07/18/2022 05:39 AM    EOSABS 0.10 07/18/2022 05:39 AM    BASOSABS 0.03 07/18/2022 05:39 AM     WBC:    Lab Results   Component Value Date/Time    WBC 5.8 07/18/2022 05:39 AM     Platelets:    Lab Results   Component Value Date/Time     07/18/2022 05:39 AM     Hemoglobin/Hematocrit:    Lab Results   Component Value Date/Time    HGB 8.3 07/18/2022 05:39 AM    HCT 27.5 07/18/2022 05:39 AM     BMP:    Lab Results   Component Value Date/Time     07/18/2022 05:39 AM    K 3.6 07/18/2022 05:39 AM    K 5.0 07/06/2022 02:41 AM     07/18/2022 05:39 AM    CO2 33 07/18/2022 05:39 AM    BUN 21 07/18/2022 05:39 AM    LABALBU 3.4 07/18/2022 05:39 AM    CREATININE 1.1 07/18/2022 05:39 AM    CALCIUM 8.4 07/18/2022 05:39 AM    GFRAA 59 07/18/2022 05:39 AM    LABGLOM 49 07/18/2022 05:39 AM    GLUCOSE 114 07/18/2022 05:39 AM     PT/INR:    Lab Results   Component Value Date/Time    PROTIME 14.2 07/18/2022 05:39 AM    INR 1.3 07/18/2022 05:39 AM       IMAGING:   Imaging Studies:    XR CHEST PORTABLE    Result Date: 7/15/2022  No sizable pleural effusion. XR CHEST PORTABLE    Result Date: 7/14/2022  Mild subsegmental atelectasis in the left lower lobe. XR CHEST PORTABLE    Result Date: 7/12/2022  Grossly stable pulmonary opacities in the lower left lung, which may represent atelectasis or pneumonia.             PROCEDURES: none today    FLUIDS:       Notable Cultures:      Blood cultures   Blood Culture, Routine   Date Value Ref Range Status   07/05/2022 5 Days no growth  Final     Respiratory cultures No results found for: RESPCULTURE   Gram Stain Result   Date Value Ref Range Status   07/08/2022 Refer to ordered Gram stain for results  Final     Urine   Creatinine Ur POCT   Date Value Ref Range Status   06/17/2019 50 mg/dl  Final     Urine Culture, Routine   Date Value Ref Range Status   07/05/2022 Growth not present  Final     Legionella No results found for: LABLEGI  C Diff PCR No results found for: CDIFPCR  Wound culture/abscess: No results for input(s): WNDABS in the last 72 hours. Tip culture:No results for input(s): CXCATHTIP in the last 72 hours. Antibiotic  Days  Day started                                  OXYGENATION: 2L nasal cannula    DIET: resume diet now then NPO at midnight for EGD tomorrow        Resident's Assessment and Plan     Baldemar Ca is a 66 y.o. female with  has a past medical history of A-fib ([address] Acute on chronic congestive heart failure ([address] Anxiety, Asthma, CAD (coronary artery disease), Cancer ([address] Chronic kidney disease, Depression, Diabetes mellitus ([address] H/O mammogram, Hx MRSA infection, Hyperlipidemia, Hypertension, Lateral epicondylitis, SERENA on CPAP, Parkinson's disease ([address] and Tubal ligation status. came here with CC   Chief Complaint   Patient presents with    Altered Mental Status     per ems family reports ams x 45 minutes, recent psych med changes and right toe amputation        SUMMARY OF HOSPITAL STAY: Briefly, pt is a 66year old woman admitted for acute hypoxic hypercapnic resp failure and AMS likely 2/2 pleural effusion, possible HAP and acute decompensated HFpEF exacerbation (EF 70-75%), and septic shock likely 2/2 pneumonia, hypernatremia and lactic acidosis. She was intubated and put in the ICU and extubated after improvement on 7/10/22.  Patient has completed a 10 day course of cefepime 2000 mg IV q12hr and vancomycin for possible HAP. Patient had an episode of black diarrhea and was FOBT positive on 7/16/22. Patient's Hb dropped to 6.7 which required a packed RBC on 7/17/22 which brought the Hb up to 8.3 General surgery is planning on doing an EGD on 7/19/22. D/C planning started for [facility]. ASA and eliquis held d/t recent FOBT positivity and drop in Hb. Days since admission: 13    Consults:   General surgery     Assessment:    Neurology  Acute encephalopathy - resolved  Intubated in ICU d/t AMS  Extubated on 7/10/22  Plan:  Continue seroquel   Hold home depakote d/t it's association with encephalopathy   Hx of Parkinson  Stable   Plan:  Continue carbidopa-levodopa   Hx of restless leg syndrome  Patient complaining of worsening during night time  Plan:  Continue ropinirole 1mg TID   Hx of depression and anxiety  Plan:  Continue quetiapine     Cardiovascular   Hx of permanent A-fib  DCCV in April and may 2022, currently rate controlled   Plan:  Continue with metoprolol and amiodarone   Hold Eliquis and ASA d/t concern of worsening Hb   Continue to hold digoxin d/t previus toxicity   Hx of HTN  Plan:  Continue metoprolol   Hx of CAD   Hx of HLD  Plan:  Continue atorvastatin 20mg    Pulmonology  Pulmonology hypertension II 2/2 acute decompensated diastolic heart failure in the setting of SERENA  Hx of heart failure w/ preserved EF last echo done 7/7/22  Edema of the legs is improving   Plan:  Fluid restriction  Continue metoprolol   Continue lasix as recommended by Nephrology  Moderate RIGHT pleural effusion likely 2/2 HAP (Klebsiella pneumoniae) vs acute decompensated HF   Most recent CR on 7/15/22 shoed no sizable pleural effusion compared to the CXR on 7/13/22   Finished 10 day course of cefepime and vanc   Plan:  Monitor resp status, wean O2 as able. Pulmonology following.   Continue to chest tube output  Hx of asthma  Plan:  Continue ipratropium-albuterol 1 ampule, inhalation Q4H WA   Hx of SERENA  Patient not using CPAP at home    Gastrointestinal   Melena 2/2 gastritis vs stress ulcer post ICU  Melena 7/15/22  Possibly d/t UGIB   Pt reports nausea   Previous EGD in 2015 showed mild gastritis   FOBT + on 7/16/22  Required transfusion of 1pRBC on 7/17/22 Hb 6.7 > 8.3 post transfusion   Plan:  NPO at midnight, GS will do EGD on 7/19/22     Endocrinology  Hx of type 2 DM w/ neuropathyOn lantus 10 and Novolog SS at home   Plan:  Insulin glargine 5 units SQ nightly and insulin lispro 0-6 units SQ 4X daily AC and HS    Infectious disease  Septic shock likely 2/2 CAP - resolved   Lactic acidosis - resolved  Patient finished her 10 day course of vanc + cefepime        Problems resolved during this admission:   Acute encephalopathy multifactorial 2/2 septic shock, digoxin toxicity, uremia   Acute hypoxic hypercapnic respiratory failure likely 2/2 to RIGHT pleural effusion, possible HAP vs acute decompensated HFpEF exacerbation (EF 70-75)   Shock likely septic, HAP, digoxin toxicity  HAGMA 2/2 lactic acidiosis (hypoperfusion, septic shock), uremia 2/2 SHANTANU  Elevated troponin  SHANTANU stage III on CKD  Metabolic alkalosis likely contraction alkalosis       PT/OT: AM/PAC   DVT ppx: Hold anticoagulants d/t lowering Hb and melena   GI ppx: Protonix BID       Next of Kin/ POA:  Daughter     Code Status:   Full    Disposition:   Continue current care      Nakia Quiñonez MD, PGY-1  Attending physician: Dr. Clarisse Saeed    Senior Resident Addendum:  I have seen and examined the patient with the intern. I have discussed the case, including pertinent history and exam findings with the intern. I agree with the assessment, plan and orders as documented above with the following additions:    Days since admission: 13    Consults:   Critical care  Pulmonology  Palliative care  Cardiology  Nephrology      Assessment & Plan:  Normocytic anemia 2/2 ? GIB  CBC daily  Follow repeat H/H at 1800  Transfuse for Hb<7  GIB  S/p 2u pRBC since admission, Hb still actively dropping  GS consulted: for EGD 7/19/22, NPO at midnight  FOBT positive on 7/16/22  Oropharyngeal dysphagia  SLP eval: minced and moist diet   Pleural effusion s/p chest tube insertion on 7/8/22  400cc drainage past 24h  Pulmonology following  Water seal, no air leak  Hx of permanent afib  Hold eliquis 2/2 acute anemia and GIB, requiring transfusions  Hx of HTN  Hx of Type 2 DM  Continue lantus 5u qhs, LDSS  Hx of depression/anxiety  On home seroquel  Hx of restless leg syndrome  Continue home ropinirole  Hx of Parkinson's disease  Continue home sinemet    Encephalopathy improved, no longer having agitation episodes as when patient was in ICU.  Continue to hold depakote which was started this admission specifically for agitation, no evidence of seizure on EEG      Problems resolved during this admission:   Acute encephalopathy, multifactorial 2/2 shock, digoxin toxicity, uremia  Shock 2/2 sepsis  Acute hypoxic hypercapnic respiratory failure 2/2 pleural effusion and acute HFpEF decompensation  Acute pancreatitis  SHANTANU Stage III on CKD  Hyperkalemia  HAGMA 2/2 lactic acidosis, uremia  Elevated INR      PT/OT: [facility] 14/24  DVT ppx: PCDs, eliquis on hold  GI ppx: protonix 40mg BID    Disposition: continue current care    Shashank Lutz MD, PGY-2  7/18/2022  2:40 PM

## 2022-07-18 NOTE — PROGRESS NOTES
[facility]  Internal Medicine Residency / [address]    Attending Physician Statement  I have discussed the case, including pertinent history and exam findings with the resident and the team.  I have seen and examined the patient and the key elements of the encounter have been performed by me. I have also personally reviewed imaging studies and labs. I agree with the assessment, plan and orders as documented by the resident. S/p transfusion of 1 u prbc overnight. No recurrence of tarry stools per patient. VS table (+) epigastric pain (+) pink palpebral conjunctivae (-) pallor (+) clear breath sounds. She is currently in sinus. Assessment:  Acute on chronic anemia, possible UGIB. EGD in 2015 - mild gastritis. Patient was in septic shock. Antiplatelet, anticoagulation has been on hold. Acute hypoxic respiratory failure 2/2 to CAP with development of pleural effusion, possible HFpEF. S/p chest tube insertion, R - still draining. Moderate oropharyngeal dysphagia - dietary recommendation noted  HTN, controlled with current management  RLS, controlled with current management  AF, currently in sinus. Tolerating BB and amiodarone. She has been off anticoagulation given anemia and concern for GIB  Septic shock, resolved. Abx course completed. Acute encephalopathy, resolved        Plan:  GS consulted today  Can resume diet now then NPO at midnight in preparation for EGD  PPI  Continue holding digoxin, depakote  Monitor chest tube output. Wean O2 as able. Pulmonology following. Continue lasix as recommended by Nephrology  Continue holding antiplatelet and anticoagulation  SCD  Appreciate care from all consult services. Remainder of medical problems as per resident note. Geoffrey Garza MD  Internal Medicine

## 2022-07-18 NOTE — PROGRESS NOTES
Dr. Gallo Sands notified of consult. Electronically signed by Demetrius Roque RN on 7/18/2022 at 10:22 AM    Goldy notified.  Electronically signed by Demetrius Roque RN on 7/18/2022 at 10:25 AM

## 2022-07-18 NOTE — CARE COORDINATION
Spoke with [facility], they do not have any available beds at this point, if something changes on bed availability they would be willing to accept. Something is wrong with [facility] phone lines today, each time you call the number rings unavailable. [facility] referral initiated Friday, no response yet, left message with admissions worker again today. Referral to [facility]. Will initiate precert once accepting facility located. Blayne Forde also feels they may be able to accept at [facility] or [facility] if family would be agreeable to this. [facility] now has bed available and will initiate precert. Updated daughter, Jay Babin, notified her of probable insurance determination and discharge tomorrow. Pasrr and ambulance on soft chart. For questions I can be reached at [phone].  Liza Irizarry [place]

## 2022-07-18 NOTE — PROGRESS NOTES
[facility] [place] [id number] [place] [address], [address] [place] [address] [address]      Date:2022                                                  Patient Name: Mona Kinney  MRN: [id number]  : 1950  Room: [address] [place] [address]    Evaluating OT: EMERY Davila, OTR/L  # [id number]    Referring Provider:  Kelly Schmidt MD, Cheri White MD  Specific Provider Orders:  Sherly Demetrius and Treat\"  22    Diagnosis: Shock ([address] [R57.9]  Uremia [N19]  SHANTANU (acute kidney injury) ([address] [N17.9]  Poisoning by digoxin, accidental or unintentional, initial encounter [T46.0X1A]  Altered mental status, unspecified altered mental status type [R41.82]  Acute pancreatitis, unspecified complication status, unspecified pancreatitis type [K85.90]    Pt was admitted w/ Altered Mental Status, Bradycardia, Hypotension. Recent Med Changes for Psych issues/R Toe Amputation (22). Pertinent Medical History:  Pt has a past medical history of A-fib ([address] Acute on chronic congestive heart failure ([address] Anxiety, Asthma, CAD (coronary artery disease), Cancer ([address] Chronic kidney disease, Depression, Diabetes mellitus ([address] H/O mammogram, Hx MRSA infection, Hyperlipidemia, Hypertension, Lateral epicondylitis, SERENA on CPAP, Parkinson's disease ([address] and Tubal ligation status. ,  has a past surgical history that includes Gallbladder surgery; Toe amputation; Cholecystectomy; Colonoscopy (7/29/15); Endoscopy, colon, diagnostic (7/19/15); Upper gastrointestinal endoscopy; lumbar laminectomy; Tonsillectomy; Breast lumpectomy; Breast reduction surgery; Cardiac catheterization (2014); Cardiac catheterization (2022); and CT GUIDED CHEST TUBE (2022).     Surgeries this admission: None   Intubated 22, Right Pigtail Chest Tube Placed posteriorly Right Lung 22, Extubated 22    Precautions:  Fall Risk  Pure-Wick Catheter  Pigtail Chest Tube - posteriorly R Lung  Minced/Moist Diet/No Straw      Assessment of current deficits   [x] Functional mobility  [x]ADLs  [x] Strength               []Cognition   [x] Functional transfers   [x] IADLs         [x] Safety Awareness   [x]Endurance   [] Fine Coordination              [x] Balance      [] Vision/perception   []Sensation    []Gross Motor Coordination  [] ROM  [] Delirium                   [] Motor Control       OT PLAN OF CARE   OT POC based on physician orders, patient diagnosis and results of clinical assessment    Frequency/Duration 1-3 days/wk for 2 weeks PRN   Specific OT Treatment to include:     * Instruction/training on adapted ADL techniques and AE recommendations to increase functional independence within precautions       * Training on energy conservation strategies, correct breathing pattern and techniques to improve independence/tolerance for self-care routine  * Functional transfer/mobility training/DME recommendations for increased independence, safety, and fall prevention  * Patient/Family education to increase follow through with safety techniques and functional independence  * Recommendation of environmental modifications for increased safety with functional transfers/mobility and ADLs  * Cognitive retraining/development of therapeutic activities to improve problem solving, judgement, memory, and attention for increased safety/participation in ADL/IADL tasks  * Therapeutic exercise to improve motor endurance, ROM, and functional strength for ADLs/functional transfers  * Therapeutic activities to facilitate/challenge dynamic balance, stand tolerance for increased safety and independence with ADLs  * Therapeutic activities to facilitate gross/fine motor skills for increased independence with ADLs  * Neuro-muscular re-education: facilitation of righting/equilibrium reactions  * Positioning to improve skin integrity, interaction with environment and functional independence  * Delirium prevention/treatment  * Manual techniques for edema management  Other:    Recommended Adaptive Equipment: TBD as pt progresses       Home Living:  Pt lives alone in a single-level apartment, Level Entry, No Basement. Bathroom setup:  Walk-in-Shower, Standard-height Commode   Equipment owned:  Rollator, Shower chair    Available Family Assist:  Family members live locally - provide assist PRN    Prior Level of Function:  Pt reported being IND with all ADLs, Light IADLs, Transfers and Mobility using Rollator for ambulation. Driving:  No  Occupation:  None reported    Pain Level:  Denied pain    Additional Complaints:  \"I'm exhausted\", Mild Discomfort w/ Chest Tube, No Dizziness     Cognition: A & O x 4 - able to ID current Day/date INDly   Able to Follow Multi-Step Commands w/ occasional Min VCs for safety   Memory:  good    Sequencing:  good (-)   Problem solving:  good (-)   Judgement/safety:  good (-)  Additional Comments:  Pt was pleasant and cooperative.       Vitals/Lab Values:  Pt found on room air - O2 sats 88%, NC Replaced - O2 sats improved to 96% w/in 30 seconds - remained > 90% for duration of session       Functional Assessment:  AM-PAC Daily Activity Raw Score: 14/24     Initial Eval Status  Date: 7/13/22   Treatment Status  Date:  7-18-22 STGs = LTGs  Time frame: 10-14 days   Feeding SUP/Set up     SUP/Set up NA   Grooming Min A/Set up    Min A for hair care  Initially required Min A for safety w/ dynamic sitting balance - improved to Close SUP EOB   Unable to tolerate ambulating to or standing at sink   Min A/Set up    Min A hair care seated EOB, Mild discomfort w/ reaching overhead for task r/t chest tube SUP/Set up  Seated/Standing at the Sink   UB Dressing Mod A/Set up    Simulated - seated EOB  Unable to tolerate item retrieval for activities   Mod A/Set up    Donning/doffing gown seated EOB  Pt ed for adaptive techs r/t CT for safety    Set up     LB Dressing Dep    Max A to don socks  Mod A of 2 for safety in standing - pants simulated  Pt ed for safe/adaptive techs, use of adaptive equip   Max A    Max A to don socks seated EOB  Max A to adjust pants over hips (Simulated) + Mini A for safety w/ standing balance during task, pt ed re: Benefits of use of adaptive equipment   Min A     Bathing NT     NT Min A      Toileting Dep    Pure-Wick  Fecal Mgmt System   Max A    Incontinent of urine prior to session - Max A for hygiene in side-lying  Later in session moved bowels - Max A for hygiene/clothing adjustment w/ Min A for safety w/ standing w/ use of [employer]   Min A     Bed Mobility  Supine to sit: Max A of 2   Sit to supine:  Max A of 2     VCs for safety   Supine to sit: Mod A   Sit to supine:   Mod A     VCs for safety/awareness of chest tube, Hand-placement   Supine to sit: SUP  Sit to supine: SUP     Functional Transfers Mod A of 2 for safety    Standing from EOB, mgmt of multiple lines  Pt ed for safety/hand placement   Min A for safety    Standing from EOB 2x BSC 1x, mgmt of multiple lines  Pt ed for safety/hand placement SUP     Functional Mobility Mod A of 2 for safety w/ [employer]    Side-stepping very short distance along EOB  Pt ed for safety/improved safety awareness, walker safety   Min A w/ [employer]  Mod VCs    Side-stepping very short distance along EOB and b/t surfaces 2x w/ seated rest break b/t trials  Pt ed for safety/improved safety awareness, walker safety   SUP     Balance Sitting:     Static:  Min A -> SUP    Dynamic:  Min A -> Close SUP w/ functional ax EOB     Standing:     Static:  Mod A of 2 w/ [employer]    Dynamic:  Mod A of 2 w/ functional ax/mobility w/ [employer]   Sitting:     Static:  SUP    Dynamic:  Close SUP w/ functional ax EOB/BSC    Standing:     Static:  Min A w/ [employer]    Dynamic:  Min A w/ functional ax/mobility w/ [employer] Sitting:     Static:  IND    Dynamic:  IND w/ functional ax    Standing:     Static:  SUP w/ AD PRN    Dynamic:  SUP w/ functional ax/mobility w/ AD PRN   Activity Tolerance simplification for completion of ADLs:     Neuromuscular Reeducation to facilitate balance/righting reactions for increased function with ADLs:    Skilled positioning/alignment for Pain Mgmt, Skin Integrity, Edema Control, to maximize Pt's safety and ability to [facility] interact w/ his/her environment, maximize respiratory status  Activity tolerance - Sitting/Standing to improve endurance w/ functional ax   Cognitive retraining -  Cues for safety/safety awareness, sequencing, problem solving    Skilled monitoring of Vitals during session and pt's response to tx ax      Consulted RN, SW/CM    Made all appropriate Environmental Modifications to facilitate pt's level of IND and safety. Recommendations for Continued Participation in OT services during Hospitalization and at D/C - SNF    Pt and/or Family verbalized/demonstrated a Good understanding of education provided. Will Review PRN.       Time In: 4155  Time Out: 1049  Total Treatment Time: 57 minutes    Min Units   OT Eval Low 85639       OT Eval Medium 18347      OT Eval High [id number]      OT Re-Eval [id number]       Therapeutic Ex [phone]       Therapeutic Activities 99050  15  1   ADL/Self Care 14323  42  3   Orthotic Management 66990       Manual [id number]     Neuro Re-Ed [id number]       Non-Billable Time            Kenneth Martinez, EMERY, OTR/L  # [id number]

## 2022-07-18 NOTE — PLAN OF CARE
Updated HPI     Ms. Paulina Canchola is a 65 y/o F admitted to the ICU for acute hypoxic hypercapnic respiratory failure and altered mental status likely secondary to pleural effusion, possible HAP and acute decompensated HFpEF exacerbation (EF 70-75%%), and septic shock likely secondary to pneumonia, hypernatremia, and lactic acidosis she has completed 10 days of cefepime 2000mg IV q12h for possible HAP whose subsequent CXR revealed no acute process. She was intubated and placed in the ICU, however, was extubated after improvement on 7/10/2022. Patient mentioned that she had one episode of black diarrhea yesterday night and hemoglobin is dropping, FOBT was positive. Pt mentioned that she had a bowel movement yesterday evening, but cannot recall if it was bloody  or black. D/c planning started for [facility]. Asprin and Eliquis held due to recent FOBT positivity to prevent further bleeding. At 5:43pm today hemoglobin trended down from 7.1 to 6.7 and 1 unit of PRBC was administered and H & H will continue to be followed.

## 2022-07-19 ENCOUNTER — ANESTHESIA (OUTPATIENT)
Dept: ENDOSCOPY | Age: 72
DRG: 870 | End: 2022-07-19
Payer: MEDICARE

## 2022-07-19 ENCOUNTER — ANESTHESIA EVENT (OUTPATIENT)
Dept: ENDOSCOPY | Age: 72
DRG: 870 | End: 2022-07-19
Payer: MEDICARE

## 2022-07-19 PROBLEM — J90 PLEURAL EFFUSION ON RIGHT: Status: ACTIVE | Noted: 2022-07-19

## 2022-07-19 PROBLEM — D64.9 ACUTE ON CHRONIC ANEMIA: Status: ACTIVE | Noted: 2022-07-19

## 2022-07-19 LAB
ALBUMIN SERPL-MCNC: 3.9 G/DL (ref 3.5–5.2)
ALP BLD-CCNC: 76 U/L (ref 35–104)
ALT SERPL-CCNC: <5 U/L (ref 0–32)
ANION GAP SERPL CALCULATED.3IONS-SCNC: 8 MMOL/L (ref 7–16)
APTT: 28.8 SEC (ref 24.5–35.1)
AST SERPL-CCNC: 8 U/L (ref 0–31)
BASOPHILS ABSOLUTE: 0.02 E9/L (ref 0–0.2)
BASOPHILS RELATIVE PERCENT: 0.3 % (ref 0–2)
BILIRUB SERPL-MCNC: 0.9 MG/DL (ref 0–1.2)
BUN BLDV-MCNC: 20 MG/DL (ref 6–23)
CALCIUM SERPL-MCNC: 8.6 MG/DL (ref 8.6–10.2)
CHLORIDE BLD-SCNC: 97 MMOL/L (ref 98–107)
CO2: 35 MMOL/L (ref 22–29)
CREAT SERPL-MCNC: 1 MG/DL (ref 0.5–1)
EKG ATRIAL RATE: 312 BPM
EKG Q-T INTERVAL: 338 MS
EKG QRS DURATION: 86 MS
EKG QTC CALCULATION (BAZETT): 404 MS
EKG R AXIS: 60 DEGREES
EKG T AXIS: -93 DEGREES
EKG VENTRICULAR RATE: 86 BPM
EOSINOPHILS ABSOLUTE: 0.12 E9/L (ref 0.05–0.5)
EOSINOPHILS RELATIVE PERCENT: 2 % (ref 0–6)
GFR AFRICAN AMERICAN: >60
GFR NON-AFRICAN AMERICAN: 54 ML/MIN/1.73
GLUCOSE BLD-MCNC: 173 MG/DL (ref 74–99)
HCT VFR BLD CALC: 26.9 % (ref 34–48)
HEMOGLOBIN: 8 G/DL (ref 11.5–15.5)
IMMATURE GRANULOCYTES #: 0.04 E9/L
IMMATURE GRANULOCYTES %: 0.7 % (ref 0–5)
INR BLD: 1.3
LYMPHOCYTES ABSOLUTE: 0.78 E9/L (ref 1.5–4)
LYMPHOCYTES RELATIVE PERCENT: 13.1 % (ref 20–42)
MAGNESIUM: 1.8 MG/DL (ref 1.6–2.6)
MCH RBC QN AUTO: 27.3 PG (ref 26–35)
MCHC RBC AUTO-ENTMCNC: 29.7 % (ref 32–34.5)
MCV RBC AUTO: 91.8 FL (ref 80–99.9)
METER GLUCOSE: 143 MG/DL (ref 74–99)
METER GLUCOSE: 200 MG/DL (ref 74–99)
METER GLUCOSE: 214 MG/DL (ref 74–99)
MONOCYTES ABSOLUTE: 0.43 E9/L (ref 0.1–0.95)
MONOCYTES RELATIVE PERCENT: 7.2 % (ref 2–12)
NEUTROPHILS ABSOLUTE: 4.58 E9/L (ref 1.8–7.3)
NEUTROPHILS RELATIVE PERCENT: 76.7 % (ref 43–80)
PDW BLD-RTO: 17.2 FL (ref 11.5–15)
PHOSPHORUS: 2.9 MG/DL (ref 2.5–4.5)
PLATELET # BLD: 222 E9/L (ref 130–450)
PMV BLD AUTO: 10.8 FL (ref 7–12)
POTASSIUM SERPL-SCNC: 4.1 MMOL/L (ref 3.5–5)
PROTHROMBIN TIME: 14.3 SEC (ref 9.3–12.4)
RBC # BLD: 2.93 E12/L (ref 3.5–5.5)
SODIUM BLD-SCNC: 140 MMOL/L (ref 132–146)
TOTAL PROTEIN: 6 G/DL (ref 6.4–8.3)
WBC # BLD: 6 E9/L (ref 4.5–11.5)

## 2022-07-19 PROCEDURE — 43235 EGD DIAGNOSTIC BRUSH WASH: CPT | Performed by: SURGERY

## 2022-07-19 PROCEDURE — 92526 ORAL FUNCTION THERAPY: CPT

## 2022-07-19 PROCEDURE — 83735 ASSAY OF MAGNESIUM: CPT

## 2022-07-19 PROCEDURE — 2580000003 HC RX 258

## 2022-07-19 PROCEDURE — 6360000002 HC RX W HCPCS: Performed by: SURGERY

## 2022-07-19 PROCEDURE — 6370000000 HC RX 637 (ALT 250 FOR IP): Performed by: SURGERY

## 2022-07-19 PROCEDURE — 99232 SBSQ HOSP IP/OBS MODERATE 35: CPT | Performed by: INTERNAL MEDICINE

## 2022-07-19 PROCEDURE — 3609017100 HC EGD: Performed by: SURGERY

## 2022-07-19 PROCEDURE — 80053 COMPREHEN METABOLIC PANEL: CPT

## 2022-07-19 PROCEDURE — 85610 PROTHROMBIN TIME: CPT

## 2022-07-19 PROCEDURE — 2580000003 HC RX 258: Performed by: SURGERY

## 2022-07-19 PROCEDURE — 85730 THROMBOPLASTIN TIME PARTIAL: CPT

## 2022-07-19 PROCEDURE — C9113 INJ PANTOPRAZOLE SODIUM, VIA: HCPCS | Performed by: SURGERY

## 2022-07-19 PROCEDURE — A4216 STERILE WATER/SALINE, 10 ML: HCPCS | Performed by: SURGERY

## 2022-07-19 PROCEDURE — 3700000001 HC ADD 15 MINUTES (ANESTHESIA): Performed by: SURGERY

## 2022-07-19 PROCEDURE — 7100000000 HC PACU RECOVERY - FIRST 15 MIN: Performed by: SURGERY

## 2022-07-19 PROCEDURE — 82962 GLUCOSE BLOOD TEST: CPT

## 2022-07-19 PROCEDURE — 2700000000 HC OXYGEN THERAPY PER DAY

## 2022-07-19 PROCEDURE — S5553 INSULIN LONG ACTING 5 U: HCPCS | Performed by: SURGERY

## 2022-07-19 PROCEDURE — 6360000002 HC RX W HCPCS

## 2022-07-19 PROCEDURE — 6360000002 HC RX W HCPCS: Performed by: INTERNAL MEDICINE

## 2022-07-19 PROCEDURE — 84100 ASSAY OF PHOSPHORUS: CPT

## 2022-07-19 PROCEDURE — 99222 1ST HOSP IP/OBS MODERATE 55: CPT | Performed by: SURGERY

## 2022-07-19 PROCEDURE — 2709999900 HC NON-CHARGEABLE SUPPLY: Performed by: SURGERY

## 2022-07-19 PROCEDURE — 36415 COLL VENOUS BLD VENIPUNCTURE: CPT

## 2022-07-19 PROCEDURE — 2580000003 HC RX 258: Performed by: INTERNAL MEDICINE

## 2022-07-19 PROCEDURE — 3700000000 HC ANESTHESIA ATTENDED CARE: Performed by: SURGERY

## 2022-07-19 PROCEDURE — 2060000000 HC ICU INTERMEDIATE R&B

## 2022-07-19 PROCEDURE — 0DJ08ZZ INSPECTION OF UPPER INTESTINAL TRACT, VIA NATURAL OR ARTIFICIAL OPENING ENDOSCOPIC: ICD-10-PCS | Performed by: SURGERY

## 2022-07-19 PROCEDURE — 99233 SBSQ HOSP IP/OBS HIGH 50: CPT | Performed by: INTERNAL MEDICINE

## 2022-07-19 PROCEDURE — 7100000001 HC PACU RECOVERY - ADDTL 15 MIN: Performed by: SURGERY

## 2022-07-19 PROCEDURE — 86677 HELICOBACTER PYLORI ANTIBODY: CPT

## 2022-07-19 PROCEDURE — 94640 AIRWAY INHALATION TREATMENT: CPT

## 2022-07-19 PROCEDURE — 85025 COMPLETE CBC W/AUTO DIFF WBC: CPT

## 2022-07-19 RX ORDER — LIDOCAINE HYDROCHLORIDE 20 MG/ML
INJECTION, SOLUTION INTRAVENOUS PRN
Status: DISCONTINUED | OUTPATIENT
Start: 2022-07-19 | End: 2022-07-19 | Stop reason: SDUPTHER

## 2022-07-19 RX ORDER — MAGNESIUM SULFATE IN WATER 40 MG/ML
2000 INJECTION, SOLUTION INTRAVENOUS ONCE
Status: COMPLETED | OUTPATIENT
Start: 2022-07-19 | End: 2022-07-19

## 2022-07-19 RX ORDER — CHLOROTHIAZIDE SODIUM 500 MG/1
500 INJECTION INTRAVENOUS ONCE
Status: COMPLETED | OUTPATIENT
Start: 2022-07-19 | End: 2022-07-19

## 2022-07-19 RX ORDER — SODIUM CHLORIDE 9 MG/ML
INJECTION, SOLUTION INTRAVENOUS CONTINUOUS PRN
Status: DISCONTINUED | OUTPATIENT
Start: 2022-07-19 | End: 2022-07-19 | Stop reason: SDUPTHER

## 2022-07-19 RX ORDER — ASPIRIN 81 MG/1
81 TABLET, CHEWABLE ORAL DAILY
Status: DISCONTINUED | OUTPATIENT
Start: 2022-07-20 | End: 2022-07-27 | Stop reason: HOSPADM

## 2022-07-19 RX ORDER — SUCRALFATE 1 G/1
1 TABLET ORAL EVERY 6 HOURS SCHEDULED
Status: DISCONTINUED | OUTPATIENT
Start: 2022-07-19 | End: 2022-07-27 | Stop reason: HOSPADM

## 2022-07-19 RX ORDER — PROPOFOL 10 MG/ML
INJECTION, EMULSION INTRAVENOUS CONTINUOUS PRN
Status: DISCONTINUED | OUTPATIENT
Start: 2022-07-19 | End: 2022-07-19 | Stop reason: SDUPTHER

## 2022-07-19 RX ADMIN — ROPINIROLE HYDROCHLORIDE 1 MG: 1 TABLET, FILM COATED ORAL at 11:05

## 2022-07-19 RX ADMIN — SODIUM CHLORIDE, PRESERVATIVE FREE 10 ML: 5 INJECTION INTRAVENOUS at 11:06

## 2022-07-19 RX ADMIN — METOPROLOL TARTRATE 50 MG: 50 TABLET, FILM COATED ORAL at 21:27

## 2022-07-19 RX ADMIN — LIDOCAINE HYDROCHLORIDE 30 MG: 20 INJECTION, SOLUTION INTRAVENOUS at 08:04

## 2022-07-19 RX ADMIN — IPRATROPIUM BROMIDE AND ALBUTEROL SULFATE 1 AMPULE: .5; 2.5 SOLUTION RESPIRATORY (INHALATION) at 20:14

## 2022-07-19 RX ADMIN — METOPROLOL TARTRATE 50 MG: 50 TABLET, FILM COATED ORAL at 11:05

## 2022-07-19 RX ADMIN — INSULIN LISPRO 1 UNITS: 100 INJECTION, SOLUTION INTRAVENOUS; SUBCUTANEOUS at 12:40

## 2022-07-19 RX ADMIN — ROPINIROLE HYDROCHLORIDE 1 MG: 1 TABLET, FILM COATED ORAL at 21:27

## 2022-07-19 RX ADMIN — ATORVASTATIN CALCIUM 20 MG: 20 TABLET, FILM COATED ORAL at 11:05

## 2022-07-19 RX ADMIN — PROPOFOL 100 MCG/KG/MIN: 10 INJECTION, EMULSION INTRAVENOUS at 08:04

## 2022-07-19 RX ADMIN — SUCRALFATE 1 G: 1 TABLET ORAL at 12:40

## 2022-07-19 RX ADMIN — INSULIN LISPRO 2 UNITS: 100 INJECTION, SOLUTION INTRAVENOUS; SUBCUTANEOUS at 21:26

## 2022-07-19 RX ADMIN — APIXABAN 5 MG: 5 TABLET, FILM COATED ORAL at 21:27

## 2022-07-19 RX ADMIN — CARBIDOPA AND LEVODOPA 2 TABLET: 25; 100 TABLET, EXTENDED RELEASE ORAL at 11:05

## 2022-07-19 RX ADMIN — QUETIAPINE FUMARATE 25 MG: 25 TABLET ORAL at 21:27

## 2022-07-19 RX ADMIN — FUROSEMIDE 10 MG/HR: 10 INJECTION INTRAMUSCULAR; INTRAVENOUS at 00:48

## 2022-07-19 RX ADMIN — IPRATROPIUM BROMIDE AND ALBUTEROL SULFATE 1 AMPULE: .5; 2.5 SOLUTION RESPIRATORY (INHALATION) at 12:30

## 2022-07-19 RX ADMIN — WATER 20 ML: 1 INJECTION INTRAMUSCULAR; INTRAVENOUS; SUBCUTANEOUS at 21:31

## 2022-07-19 RX ADMIN — AMIODARONE HYDROCHLORIDE 200 MG: 200 TABLET ORAL at 11:05

## 2022-07-19 RX ADMIN — INSULIN GLARGINE-YFGN 5 UNITS: 100 INJECTION, SOLUTION SUBCUTANEOUS at 21:25

## 2022-07-19 RX ADMIN — SODIUM CHLORIDE: 9 INJECTION, SOLUTION INTRAVENOUS at 08:04

## 2022-07-19 RX ADMIN — CARBIDOPA AND LEVODOPA 2 TABLET: 25; 100 TABLET, EXTENDED RELEASE ORAL at 17:01

## 2022-07-19 RX ADMIN — FUROSEMIDE 10 MG/HR: 10 INJECTION INTRAMUSCULAR; INTRAVENOUS at 09:53

## 2022-07-19 RX ADMIN — Medication 5 MG: at 21:27

## 2022-07-19 RX ADMIN — MAGNESIUM SULFATE HEPTAHYDRATE 2000 MG: 40 INJECTION, SOLUTION INTRAVENOUS at 11:04

## 2022-07-19 RX ADMIN — CHLOROTHIAZIDE SODIUM 500 MG: 500 INJECTION, POWDER, LYOPHILIZED, FOR SOLUTION INTRAVENOUS at 21:31

## 2022-07-19 RX ADMIN — IPRATROPIUM BROMIDE AND ALBUTEROL SULFATE 1 AMPULE: .5; 2.5 SOLUTION RESPIRATORY (INHALATION) at 16:48

## 2022-07-19 RX ADMIN — BUDESONIDE 500 MCG: 0.5 SUSPENSION RESPIRATORY (INHALATION) at 20:14

## 2022-07-19 RX ADMIN — CARBIDOPA AND LEVODOPA 2 TABLET: 25; 100 TABLET, EXTENDED RELEASE ORAL at 21:27

## 2022-07-19 RX ADMIN — PANTOPRAZOLE SODIUM 40 MG: 40 INJECTION, POWDER, FOR SOLUTION INTRAVENOUS at 17:01

## 2022-07-19 RX ADMIN — INSULIN LISPRO 2 UNITS: 100 INJECTION, SOLUTION INTRAVENOUS; SUBCUTANEOUS at 17:00

## 2022-07-19 RX ADMIN — SUCRALFATE 1 G: 1 TABLET ORAL at 21:27

## 2022-07-19 RX ADMIN — ROPINIROLE HYDROCHLORIDE 1 MG: 1 TABLET, FILM COATED ORAL at 17:01

## 2022-07-19 RX ADMIN — PANTOPRAZOLE SODIUM 40 MG: 40 INJECTION, POWDER, FOR SOLUTION INTRAVENOUS at 21:29

## 2022-07-19 RX ADMIN — QUETIAPINE FUMARATE 25 MG: 25 TABLET ORAL at 11:05

## 2022-07-19 ASSESSMENT — ENCOUNTER SYMPTOMS: SHORTNESS OF BREATH: 1

## 2022-07-19 ASSESSMENT — LIFESTYLE VARIABLES: SMOKING_STATUS: 0

## 2022-07-19 ASSESSMENT — PAIN SCALES - GENERAL
PAINLEVEL_OUTOF10: 0
PAINLEVEL_OUTOF10: 0

## 2022-07-19 NOTE — ANESTHESIA PRE PROCEDURE
Department of Anesthesiology  Preprocedure Note       Name:  Ed Kim   Age:  66 y.o.  :  1950                                          MRN:  [id number]         Date:  2022      Surgeon: Liana Signs):  Renate Wilde MD    Procedure: Procedure(s):  EGD ESOPHAGOGASTRODUODENOSCOPY    Medications prior to admission:   Prior to Admission medications    Medication Sig Start Date End Date Taking?  Authorizing Provider   divalproex (DEPAKOTE) 250 MG DR tablet Take 250 mg by mouth 2 times daily   Yes Historical Provider, MD   QUEtiapine (SEROQUEL) 50 MG tablet Take 50 mg by mouth nightly   Yes Historical Provider, MD   QUEtiapine (SEROQUEL) 25 MG tablet Take 25 mg by mouth every morning   Yes Historical Provider, MD   metoprolol tartrate (LOPRESSOR) 50 MG tablet Take 100 mg by mouth 2 times daily   Yes Historical Provider, MD   rOPINIRole (REQUIP) 1 MG tablet Take 1 mg by mouth 3 times daily   Yes Historical Provider, MD   linezolid (ZYVOX) 600 MG tablet Take 600 mg by mouth 2 times daily   Yes Historical Provider, MD   cephALEXin (KEFLEX) 500 MG capsule Take 500 mg by mouth 3 times daily   Yes Historical Provider, MD   furosemide (LASIX) 20 MG tablet Take 20 mg by mouth daily   Yes Historical Provider, MD   digoxin (LANOXIN) 125 MCG tablet Take 125 mcg by mouth daily   Yes Historical Provider, MD   ferrous sulfate (IRON 325) 325 (65 Fe) MG tablet Take 325 mg by mouth daily (with breakfast)  Patient not taking: Reported on 2022    Historical Provider, MD   aspirin EC 81 MG EC tablet Take 1 tablet by mouth daily 22   Darryl Vazquez MD   apixaban (ELIQUIS) 5 MG TABS tablet Take 1 tablet by mouth 2 times daily 22   Garlan Burkitt, MD   montelukast (SINGULAIR) 10 MG tablet Take 10 mg by mouth nightly    Historical Provider, MD   omeprazole (PRILOSEC) 40 MG delayed release capsule Take 40 mg by mouth daily     Historical Provider, MD   blood glucose test strips (ACCU-CHEK RIGOBERTO PLUS) strip 1 each by In Vitro route 2 times daily Indications: DISP ACCU-CHEK As needed. Historical Provider, MD   atorvastatin (LIPITOR) 20 MG tablet Take 1 tablet by mouth daily 10/21/19   Manny Ruff MD   insulin aspart (NOVOLOG FLEXPEN) 100 UNIT/ML injection pen INJECT 0-10 UNITS INTO THE SKIN THREE TIMES DAILY(BEFORE MEALS) SLIDING SCALE (MAX DAILY 30 UNITS)  Patient taking differently: Indications: med.  approved 6/1/19-7/29/20 INJECT 0-10 UNITS INTO THE SKIN THREE TIMES DAILY(BEFORE MEALS) SLIDING SCALE (MAX DAILY 30 UNITS) 7/29/19   Manny Ruff MD       Current medications:    Current Facility-Administered Medications   Medication Dose Route Frequency Provider Last Rate Last Admin    magnesium sulfate 2000 mg in 50 mL IVPB premix  2,000 mg IntraVENous Once Katie Yuen MD        furosemide (LASIX) 100 mg in dextrose 5 % 100 mL infusion  10 mg/hr IntraVENous Continuous Jose Agarwal MD 10 mL/hr at 07/19/22 0624 10 mg/hr at 07/19/22 0624    0.9 % sodium chloride infusion   IntraVENous PRN Melita Monahan MD        rOPINIRole (REQUIP) tablet 1 mg  1 mg Oral TID Katie Yuen MD   1 mg at 07/18/22 2104    benzocaine-menthol (CEPACOL SORE THROAT) lozenge 1 lozenge  1 lozenge Oral Q2H PRN Radha Mendiola MD   1 lozenge at 07/16/22 1743    pantoprazole (PROTONIX) 40 mg in sodium chloride (PF) 10 mL injection  40 mg IntraVENous Q12H Melita Monahan MD   40 mg at 07/18/22 2349    QUEtiapine (SEROQUEL) tablet 25 mg  25 mg Oral BID Kevin Ivory MD   25 mg at 07/18/22 2105    metoprolol tartrate (LOPRESSOR) tablet 50 mg  50 mg Oral BID Kevin Ivory MD   50 mg at 07/18/22 2104    melatonin disintegrating tablet 5 mg  5 mg Oral Nightly Radha Mendiola MD   5 mg at 07/18/22 2104    labetalol (NORMODYNE;TRANDATE) injection 10 mg  10 mg IntraVENous Q6H PRN Kevin Ivory MD   10 mg at 07/11/22 0044    hydrALAZINE (APRESOLINE) injection 10 mg  10 mg IntraVENous Q6H PRN Kevin Ivory MD   10 mg at 07/11/22 0442    amiodarone (CORDARONE) tablet 200 mg  200 mg Oral Daily Kevin Ivory MD   200 mg at 07/18/22 0842    insulin lispro (HUMALOG) injection vial 0-6 Units  0-6 Units SubCUTAneous 4x Daily AC & HS Kevin Ivory MD   1 Units at 07/18/22 2104    carbidopa-levodopa (SINEMET CR)  MG per extended release tablet 2 tablet  2 tablet Oral TID Kevin Ivory MD   2 tablet at 07/18/22 2104    ondansetron (ZOFRAN) injection 4 mg  4 mg IntraVENous Q6H PRN Kevin Ivory MD   4 mg at 07/13/22 1436    budesonide (PULMICORT) nebulizer suspension 500 mcg  0.5 mg Nebulization BID Kevin Ivory MD   500 mcg at 07/18/22 2002    levalbuterol (XOPENEX) nebulization 0.63 mg  0.63 mg Nebulization Q4H PRN Kevin Ivory MD        insulin glargine-yfgn [facility]) injection vial 5 Units  5 Units SubCUTAneous Nightly Kevin Ivory MD   5 Units at 07/18/22 2103    ipratropium-albuterol (DUONEB) nebulizer solution 1 ampule  1 ampule Inhalation Q4H WA Kevin Ivory MD   1 ampule at 07/18/22 2002    [Held by provider] aspirin chewable tablet 81 mg  81 mg Oral Daily Kevin Ivory MD   81 mg at 07/07/22 5297    polyethylene glycol (GLYCOLAX) packet 17 g  17 g Oral BID Kevin Ivory MD   17 g at 07/15/22 2027    [Held by provider] apixaban (ELIQUIS) tablet 5 mg  5 mg Oral BID Kevin Ivory MD   5 mg at 07/09/22 2021    atorvastatin (LIPITOR) tablet 20 mg  20 mg Oral Daily Kevin Ivory MD   20 mg at 07/18/22 0842    sodium chloride flush 0.9 % injection 5-40 mL  5-40 mL IntraVENous 2 times per day Kevin Ivory MD   10 mL at 07/18/22 2105    0.9 % sodium chloride infusion   IntraVENous PRN Kevin Ivory MD 10 mL/hr at 07/12/22 0229 New Bag at 07/12/22 0229    acetaminophen (TYLENOL) tablet 650 mg  650 mg Oral Q6H PRN Kevin Ivory MD   650 mg at 07/12/22 0835    Or    acetaminophen (TYLENOL) suppository 650 mg  650 mg Rectal Q6H PRN Bella Noyola MD        glucose chewable tablet 16 g  4 tablet Oral PRN Bella Noyola MD        dextrose bolus 10% 125 mL  125 mL IntraVENous PRN Bella Noyola MD        Or    dextrose bolus 10% 250 mL  250 mL IntraVENous PRN Bella Noyola MD        glucagon (rDNA) injection 1 mg  1 mg IntraMUSCular PRN Bella Noyola MD        dextrose 5 % solution  100 mL/hr IntraVENous PRN Bella Noyola MD         Facility-Administered Medications Ordered in Other Encounters   Medication Dose Route Frequency Provider Last Rate Last Admin    propofol injection   IntraVENous Continuous PRN Felisha Ashley RN 64.8 mL/hr at 07/19/22 0804 100 mcg/kg/min at 07/19/22 0804    lidocaine (cardiac) (XYLOCAINE) injection   IntraVENous PRN Felisha Ashley RN   30 mg at 07/19/22 0804    0.9 % sodium chloride infusion   IntraVENous Continuous PRN Felisha Ashley RN   Stopped at 07/19/22 6433       Allergies: Allergies   Allergen Reactions    Ciprofloxacin Nausea And Vomiting    Codeine Itching     Creepy crawly \" feelings       Problem List:    Patient Active Problem List   Diagnosis Code    Depression with anxiety F41.8    Osteoporosis M81.0    Asthma J45.909    Hyperlipidemia E78.5    Mitral regurgitation I34.0    Obstructive sleep apnea syndrome G47.33    Psoriasis L40.9    Diabetes mellitus ([facility] [address] E11.9    Parkinson's disease ([facility] [address] G20    Primary hypertension I10    Microalbuminuria R80.9    Morbid obesity (HCC) E66.01    Restless leg syndrome G25.81    Generalized weakness R53.1    Inability to walk R26.2    Hypothyroidism E03.9    Chest pain R07.9    Acute asthma exacerbation J45. 901    Asthma exacerbation, mild J45. [place] onset a-fib ([facility]) I48.91    Atrial fibrillation with rapid ventricular response ([facility]) I48.91    Acute on chronic congestive heart failure ([facility]) I50.9    Coronary artery disease involving native coronary artery of native 07/18/22 1645 07/18/22 2104 07/19/22 0700   BP:  (!) 96/52 113/62 (!) 110/58   Pulse: 83 71 71 76   Resp: 16 20 17    Temp:  36.6 °C (97.8 °F) 36.8 °C (98.2 °F)    TempSrc:  Oral Temporal    SpO2: 97%      Weight:       Height:                                                  BP Readings from Last 3 Encounters:   07/19/22 (!) 110/58   05/16/22 (!) 112/59   04/19/22 118/63       NPO Status:                                                                                 BMI:   Wt Readings from Last 3 Encounters:   07/14/22 238 lb (108 kg)   07/06/22 230 lb (104.3 kg)   05/17/22 227 lb 1.6 oz (103 kg)     Body mass index is 46.48 kg/m². CBC:   Lab Results   Component Value Date/Time    WBC 6.0 07/19/2022 04:36 AM    RBC 2.93 07/19/2022 04:36 AM    HGB 8.0 07/19/2022 04:36 AM    HCT 26.9 07/19/2022 04:36 AM    MCV 91.8 07/19/2022 04:36 AM    RDW 17.2 07/19/2022 04:36 AM     07/19/2022 04:36 AM       CMP:   Lab Results   Component Value Date/Time     07/19/2022 04:36 AM    K 4.1 07/19/2022 04:36 AM    K 5.0 07/06/2022 02:41 AM    CL 97 07/19/2022 04:36 AM    CO2 35 07/19/2022 04:36 AM    BUN 20 07/19/2022 04:36 AM    CREATININE 1.0 07/19/2022 04:36 AM    GFRAA >60 07/19/2022 04:36 AM    LABGLOM 54 07/19/2022 04:36 AM    GLUCOSE 173 07/19/2022 04:36 AM    PROT 6.0 07/19/2022 04:36 AM    CALCIUM 8.6 07/19/2022 04:36 AM    BILITOT 0.9 07/19/2022 04:36 AM    ALKPHOS 76 07/19/2022 04:36 AM    AST 8 07/19/2022 04:36 AM    ALT <5 07/19/2022 04:36 AM       POC Tests: No results for input(s): POCGLU, POCNA, POCK, POCCL, POCBUN, POCHEMO, POCHCT in the last 72 hours.     Coags:   Lab Results   Component Value Date/Time    PROTIME 14.3 07/19/2022 04:36 AM    INR 1.3 07/19/2022 04:36 AM    APTT 28.8 07/19/2022 04:36 AM       HCG (If Applicable): No results found for: PREGTESTUR, PREGSERUM, HCG, HCGQUANT     ABGs: No results found for: PHART, PO2ART, GYC2MBT, TKB1GBK, BEART, [id number]     Type & Screen (If

## 2022-07-19 NOTE — PROGRESS NOTES
[facility]  Internal Medicine Residency Program  Progress Note - House Team     Patient:  Lenny Dorsey 66 y.o. female MRN: [id number]     Date of Service: 7/19/2022     CC: altered mental status     Days since admission: 14    Subjective     Overnight events:   BP dropped to 96/52 so held the 40 IV Lasi push. Hb stable overnight 8.1 > 8.0  Replaced mag   EGD revealed same moderate diffuse gastritis and duodenitis. General surgery recommends PPI, carafate, good to go back on oral anticoagulants and to check serology for H. Pylori and treat if necessary. Patient was sleepy after the EGD in the morning and was unable to get any answers from her other than her opening her eyes and saying she's there before going back to bed. Objective     Physical Exam:  Vitals: /69   Pulse 80   Temp (!) 96.2 °F (35.7 °C) (Oral)   Resp 20   Ht 5' (1.524 m)   Wt 238 lb (108 kg)   SpO2 100%   BMI 46.48 kg/m²     I & O - 24hr:   Intake/Output Summary (Last 24 hours) at 7/19/2022 1105  Last data filed at 7/19/2022 5288  Gross per 24 hour   Intake 244.17 ml   Output 1535 ml   Net -1290.83 ml      General Appearance: appears stated age, fatigued, and no distress  HEENT:  Head: Normocephalic, no lesions, without obvious abnormality. Neck: no JVD and supple, symmetrical, trachea midline  Lung: clear to auscultation bilaterally  Heart: regular rate and rhythm, S1, S2 normal, no murmur, click, rub or gallop  Abdomen: soft, non-tender; bowel sounds normal; no masses,  no organomegaly  Extremities:  edema +2 in both extremities  Musculokeletal: No joint swelling, no muscle tenderness. ROM normal in all joints of extremities.    Neurologic: Mental status: Patient was   Subject  Pertinent Information & Imaging Studies, Consults   genesis  CBC with Differential:    Lab Results   Component Value Date/Time    WBC 6.0 07/19/2022 04:36 AM    RBC 2.93 07/19/2022 04:36 AM    HGB 8.0 07/19/2022 04:36 AM    HCT 26.9 07/19/2022 04:36 AM     07/19/2022 04:36 AM    MCV 91.8 07/19/2022 04:36 AM    MCH 27.3 07/19/2022 04:36 AM    MCHC 29.7 07/19/2022 04:36 AM    RDW 17.2 07/19/2022 04:36 AM    NRBC 0.0 03/15/2019 09:10 AM    SEGSPCT 74 08/20/2013 10:45 AM    METASPCT 0.9 07/14/2022 05:31 AM    LYMPHOPCT 13.1 07/19/2022 04:36 AM    MONOPCT 7.2 07/19/2022 04:36 AM    MYELOPCT 2.6 07/14/2022 05:31 AM    EOSPCT 1 06/16/2017 12:00 AM    BASOPCT 0.3 07/19/2022 04:36 AM    MONOSABS 0.43 07/19/2022 04:36 AM    LYMPHSABS 0.78 07/19/2022 04:36 AM    EOSABS 0.12 07/19/2022 04:36 AM    BASOSABS 0.02 07/19/2022 04:36 AM     WBC:    Lab Results   Component Value Date/Time    WBC 6.0 07/19/2022 04:36 AM     Platelets:    Lab Results   Component Value Date/Time     07/19/2022 04:36 AM     BMP:    Lab Results   Component Value Date/Time     07/19/2022 04:36 AM    K 4.1 07/19/2022 04:36 AM    K 5.0 07/06/2022 02:41 AM    CL 97 07/19/2022 04:36 AM    CO2 35 07/19/2022 04:36 AM    BUN 20 07/19/2022 04:36 AM    LABALBU 3.9 07/19/2022 04:36 AM    CREATININE 1.0 07/19/2022 04:36 AM    CALCIUM 8.6 07/19/2022 04:36 AM    GFRAA >60 07/19/2022 04:36 AM    LABGLOM 54 07/19/2022 04:36 AM    GLUCOSE 173 07/19/2022 04:36 AM     Sodium:    Lab Results   Component Value Date/Time     07/19/2022 04:36 AM     Potassium:    Lab Results   Component Value Date/Time    K 4.1 07/19/2022 04:36 AM    K 5.0 07/06/2022 02:41 AM     BUN/Creatinine:    Lab Results   Component Value Date/Time    BUN 20 07/19/2022 04:36 AM    CREATININE 1.0 07/19/2022 04:36 AM     Hepatic Function Panel:    Lab Results   Component Value Date/Time    ALKPHOS 76 07/19/2022 04:36 AM    ALT <5 07/19/2022 04:36 AM    AST 8 07/19/2022 04:36 AM    PROT 6.0 07/19/2022 04:36 AM    BILITOT 0.9 07/19/2022 04:36 AM    BILIDIR 0.2 07/20/2013 06:20 PM    LABALBU 3.9 07/19/2022 04:36 AM     Albumin:    Lab Results   Component Value Date/Time    LABALBU 3.9 07/19/2022 04:36 AM Calcium:    Lab Results   Component Value Date/Time    CALCIUM 8.6 07/19/2022 04:36 AM     Ionized Calcium:  No results found for: IONCA  Magnesium:    Lab Results   Component Value Date/Time    MG 1.8 07/19/2022 04:36 AM     Phosphorus:    Lab Results   Component Value Date/Time    PHOS 2.9 07/19/2022 04:36 AM       IMAGING:   Imaging Studies:    XR CHEST PORTABLE    Result Date: 7/15/2022  No sizable pleural effusion. XR CHEST PORTABLE    Result Date: 7/14/2022  Mild subsegmental atelectasis in the left lower lobe. PROCEDURES: EGD     FLUIDS:120 PO and 100 IV       Notable Cultures:      Blood cultures   Blood Culture, Routine   Date Value Ref Range Status   07/05/2022 5 Days no growth  Final     Respiratory cultures No results found for: RESPCULTURE   Gram Stain Result   Date Value Ref Range Status   07/08/2022 Refer to ordered Gram stain for results  Final     Urine   Creatinine Ur POCT   Date Value Ref Range Status   06/17/2019 50 mg/dl  Final     Urine Culture, Routine   Date Value Ref Range Status   07/05/2022 Growth not present  Final     Legionella No results found for: LABLEGI  C Diff PCR No results found for: CDIFPCR  Wound culture/abscess: No results for input(s): WNDABS in the last 72 hours. Tip culture:No results for input(s): CXCATHTIP in the last 72 hours. Antibiotic  Days  Day started                                  OXYGENATION: 2L nasal canula     DIET: SLP eval -> minced and moist diet         Resident's Assessment and Plan     Shona Wilson is a 66 y.o. female with  has a past medical history of A-fib ([address] Acute on chronic congestive heart failure ([address] Anxiety, Asthma, CAD (coronary artery disease), Cancer ([address] Chronic kidney disease, Depression, Diabetes mellitus ([address] H/O mammogram, Hx MRSA infection, Hyperlipidemia, Hypertension, Lateral epicondylitis, SERENA on CPAP, Parkinson's disease ([address] and Tubal ligation status.   came here with CC   Chief Complaint Patient presents with    Altered Mental Status     per ems family reports ams x 45 minutes, recent psych med changes and right toe amputation        SUMMARY [facility]: Briefly, pt is a 66year old woman admitted for acute hypoxic hypercapnic resp failure and AMS likely 2/2 pleural effusion, possible HAP and acute decompensated HFpEF exacerbation (EF 70-75%), and septic shock likely 2/2 pneumonia, hypernatremia and lactic acidosis. She was intubated and put in the ICU and extubated after improvement on 7/10/22. Patient has completed a 10 day course of cefepime 2000 mg IV q12hr and vancomycin for possible HAP. Patient had an episode of black diarrhea and was FOBT positive on 7/16/22. Patient's Hb dropped to 6.7 which required a packed RBC on 7/17/22 which brought the Hb up to 8.3 GS performed EGS (7/19) showing moderate diffuse gastritis and duodenitis.     Days since admission: 14    Consults:   Critical Care  Pulmonology  Palliative care  Cardiology  Nephrology  GS    Assessment & Plan:     Neurology  Acute encephalopathy - resolved  Intubated in ICU d/t AMS  Extubated on 7/10/22  Plan:  Hold home depakote d/t it's association with encephalopathy  Hx of Parkinson  Stable  Plan:  Continue home carbidopa-levodopa  Hx of restless leg syndrome  Patient complaining of worsening during night time  Plan:  Continue home ropinirole 1mg TID  Hx of depression and anxiety  Plan:  Continue home quetiapine     Cardiovascular  Hx of permanent A-fib  DCCV in April and may 2022, currently rate controlled  Plan:  Continue with metoprolol and amiodarone  Continue Eliquis   Hold digoxin d/t previus toxicity  Hx of HTN  Plan:  Continue metoprolol  Hx of CAD  Plan:  Continue ASA  Hx of HLD  Plan:  Continue atorvastatin 20mg  Edema 2/2 acute decompensated diastolic heart failure   Hx of heart failure w/ preserved EF (70-75%, mild tricuspid regurg) last echo done 7/7/22   Plan:  Continue metoprolol  Nephro recommends continue lasix, consider IV diuril as warranted, follow labs, low Na diet, fluid restriction, follow I/O's     Pulmonology  Moderate RIGHT pleural effusion likely 2/2 acute decompensated HF vs ? HAP  Most recent CR on 7/15/22 shoed no sizable pleural effusion compared to the CXR on 7/13/22  Finished 10 day course of cefepime and vanc  Transudative pleural effusion   Plan:  Monitor resp status, wean O2 as able. Pulmonology following. Continue to chest tube (placed 7/8) decreased output from yesterday  Hx of asthma  Plan:  Continue ipratropium-albuterol 1 ampule, inhalation Q4H WA  Hx of SERENA  Patient not using CPAP at home     Gastrointestinal  Moderate gastritis with duodenitis   EGD (7/19) showed moderate diffuse gastritis and duodenitis with no bleeding  Previous EGD in 2015 showed mild gastritis  FOBT (+) on 7/16/22  Melena 7/15/22  Required 2nd transfusion of 1pRBC on 7/17 Hb 6.7 > 8.3 post and a previous transfusion on of 1 pRBC on 7/8  Plan:   recommends PPI, carafate,check serology for H. Pylori and treat if necessary.    Monitor for drop of Hb   Oropharyngeal dysphasia  SLP eval -> minced and moist diet  Plan:  Reevaluate SLP     Endocrinology  Hx of type 2 DM w/ neuropathyOn lantus 10 and Novolog SS at home  Plan:  Insulin glargine 5 units SQ nightly and insulin lispro 0-6 units SQ 4X daily AC and HS     Infectious disease  Septic shock likely 2/2 CAP - resolved  Lactic acidosis - resolved  Patient finished her 10 day course of vanc + cefepime      Problems resolved during this admission:   Acute encephalopathy multifactorial 2/2 septic shock, digoxin toxicity, uremia  Acute hypoxic hypercapnic respiratory failure likely 2/2 to RIGHT pleural effusion, possible HAP vs acute decompensated HFpEF exacerbation (EF 70-75)  Shock likely septic, HAP, digoxin toxicity  HAGMA 2/2 lactic acidiosis (hypoperfusion, septic shock), uremia 2/2 SHANTANU  Elevated troponin  SHANTANU stage III on CKD  Metabolic alkalosis likely contraction alkalosis       PT/OT: [facility] 14/24  DVT ppx: resume Eliquis   GI ppx: protonix 40mg BID and carafate       Next of Kin/ POA:  Daughter    Code Status:   FULL code    Disposition:   continue current care      Cate Lakhani MD, PGY-1  Attending physician: Dr. Moni Rojas       Senior Resident Addendum:  I have seen and examined the patient with the intern. I have discussed the case, including pertinent history and exam findings with the intern.  I agree with the assessment, plan and orders as documented above with the following additions:     Days since admission: 13     Consults:   Critical care  Pulmonology  Palliative care  Cardiology  Nephrology       Assessment & Plan:  Normocytic anemia 2/2 Gastritis   CBC daily  Transfuse for Hb<7  GIB  S/p 2u pRBC since admission  EGD 7/19/22 showed gastritis and duodenitis  Oropharyngeal dysphagia  SLP eval: minced and moist diet  SLP to re-eval tomorrow AM to see if diet can be advanced  Pleural effusion, transudative, 2/2 HF exacerbation s/p chest tube insertion on 7/8/22  235cc drainage past 24h  Pulmonology following  Water seal, no air leak  Hx of permanent afib  Resume eliquis  Keep digoxin held as had encephalopathy with digoxin toxicity this admission  Continue amiodarone  Hx of HTN  Hx of Type 2 DM  Continue lantus 5u qhs, LDSS  Hx of depression/anxiety  On home seroquel  Hx of restless leg syndrome  Continue home ropinirole  Hx of Parkinson's disease  Continue home sinemet            Problems resolved during this admission:  Acute encephalopathy, multifactorial 2/2 shock, digoxin toxicity, uremia  Shock 2/2 sepsis  Acute hypoxic hypercapnic respiratory failure 2/2 pleural effusion and acute HFpEF decompensation  Acute pancreatitis  SHANTANU Stage III on CKD  Hyperkalemia  HAGMA 2/2 lactic acidosis, uremia  Elevated INR        PT/OT: [facility] 14/24  DVT ppx: eliquis  GI ppx: protonix 40mg BID     Disposition: continue current care     Colin Guillermo MD, PGY-2  7/18/2022  2:40 PM

## 2022-07-19 NOTE — ANESTHESIA PRE PROCEDURE
Department of Anesthesiology  Preprocedure Note       Name:  Buffy Rushing   Age:  66 y.o.  :  1950                                          MRN:  [id number]         Date:  2022      Surgeon: Cardiology    Procedure: DCCV    Medications prior to admission:   Prior to Admission medications    Medication Sig Start Date End Date Taking? Authorizing Provider   divalproex (DEPAKOTE) 250 MG DR tablet Take 250 mg by mouth 2 times daily    Historical Provider, MD   QUEtiapine (SEROQUEL) 50 MG tablet Take 50 mg by mouth nightly    Historical Provider, MD   QUEtiapine (SEROQUEL) 25 MG tablet Take 25 mg by mouth every morning    Historical Provider, MD   metoprolol tartrate (LOPRESSOR) 50 MG tablet Take 100 mg by mouth 2 times daily    Historical Provider, MD   ferrous sulfate (IRON 325) 325 (65 Fe) MG tablet Take 325 mg by mouth daily (with breakfast)  Patient not taking: Reported on 2022    Historical Provider, MD   rOPINIRole (REQUIP) 1 MG tablet Take 1 mg by mouth 3 times daily    Historical Provider, MD   linezolid (ZYVOX) 600 MG tablet Take 600 mg by mouth 2 times daily    Historical Provider, MD   cephALEXin (KEFLEX) 500 MG capsule Take 500 mg by mouth 3 times daily    Historical Provider, MD   furosemide (LASIX) 20 MG tablet Take 20 mg by mouth daily    Historical Provider, MD   digoxin (LANOXIN) 125 MCG tablet Take 125 mcg by mouth daily    Historical Provider, MD   aspirin EC 81 MG EC tablet Take 1 tablet by mouth daily 22   Clara Valente MD   apixaban (ELIQUIS) 5 MG TABS tablet Take 1 tablet by mouth 2 times daily 22   Eliza Schmidt MD   montelukast (SINGULAIR) 10 MG tablet Take 10 mg by mouth nightly    Historical Provider, MD   omeprazole (PRILOSEC) 40 MG delayed release capsule Take 40 mg by mouth daily     Historical Provider, MD   blood glucose test strips (ACCU-CHEK RIGOBERTO PLUS) strip 1 each by In Vitro route 2 times daily Indications: DISP ACCU-CHEK As needed.     Historical Provider, MD   atorvastatin (LIPITOR) 20 MG tablet Take 1 tablet by mouth daily 10/21/19   Yasmine Bull MD   insulin aspart (NOVOLOG FLEXPEN) 100 UNIT/ML injection pen INJECT 0-10 UNITS INTO THE SKIN THREE TIMES DAILY(BEFORE MEALS) SLIDING SCALE (MAX DAILY 30 UNITS)  Patient taking differently: Indications: med. approved 6/1/19-7/29/20 INJECT 0-10 UNITS INTO THE SKIN THREE TIMES DAILY(BEFORE MEALS) SLIDING SCALE (MAX DAILY 30 UNITS) 7/29/19   Yasmine Bull MD       Current medications:    No current facility-administered medications for this visit. No current outpatient medications on file.      Facility-Administered Medications Ordered in Other Visits   Medication Dose Route Frequency Provider Last Rate Last Admin    magnesium sulfate 2000 mg in 50 mL IVPB premix  2,000 mg IntraVENous Once Natalie Bocanegra MD        furosemide (LASIX) 100 mg in dextrose 5 % 100 mL infusion  10 mg/hr IntraVENous Continuous Shilpa Duval MD 10 mL/hr at 07/19/22 0624 10 mg/hr at 07/19/22 0624    0.9 % sodium chloride infusion   IntraVENous PRN Ronnell Calvillo MD        rOPINIRole (REQUIP) tablet 1 mg  1 mg Oral TID Natalie Bocanegra MD   1 mg at 07/18/22 2104    benzocaine-menthol (CEPACOL SORE THROAT) lozenge 1 lozenge  1 lozenge Oral Q2H PRN Shasha Mcduffie MD   1 lozenge at 07/16/22 1743    pantoprazole (PROTONIX) 40 mg in sodium chloride (PF) 10 mL injection  40 mg IntraVENous Q12H Ronnell Calvillo MD   40 mg at 07/18/22 2349    QUEtiapine (SEROQUEL) tablet 25 mg  25 mg Oral BID Isak Grissom MD   25 mg at 07/18/22 2105    metoprolol tartrate (LOPRESSOR) tablet 50 mg  50 mg Oral BID Isak Grissom MD   50 mg at 07/18/22 2104    melatonin disintegrating tablet 5 mg  5 mg Oral Nightly Shasha Mcduffie MD   5 mg at 07/18/22 2104    labetalol (NORMODYNE;TRANDATE) injection 10 mg  10 mg IntraVENous Q6H PRN Isak Grissom MD   10 mg at 07/11/22 0044    hydrALAZINE (APRESOLINE) injection 10 mg  10 mg IntraVENous Q6H PRN Fabienne Nunez MD   10 mg at 07/11/22 0442    amiodarone (CORDARONE) tablet 200 mg  200 mg Oral Daily Fabienne Nunez MD   200 mg at 07/18/22 0842    insulin lispro (HUMALOG) injection vial 0-6 Units  0-6 Units SubCUTAneous 4x Daily AC & HS Fabienne Nunez MD   1 Units at 07/18/22 2104    carbidopa-levodopa (SINEMET CR)  MG per extended release tablet 2 tablet  2 tablet Oral TID Fabienne Nunez MD   2 tablet at 07/18/22 2104    ondansetron (ZOFRAN) injection 4 mg  4 mg IntraVENous Q6H PRN Fabienne Nunez MD   4 mg at 07/13/22 1436    budesonide (PULMICORT) nebulizer suspension 500 mcg  0.5 mg Nebulization BID Fabienne Nunez MD   500 mcg at 07/18/22 2002    levalbuterol (XOPENEX) nebulization 0.63 mg  0.63 mg Nebulization Q4H PRN Fabienne Nunez MD        insulin glargine-yfgn [facility]) injection vial 5 Units  5 Units SubCUTAneous Nightly Fabienne Nunez MD   5 Units at 07/18/22 2103    ipratropium-albuterol (DUONEB) nebulizer solution 1 ampule  1 ampule Inhalation Q4H WA Fabienne Nunez MD   1 ampule at 07/18/22 2002    [Held by provider] aspirin chewable tablet 81 mg  81 mg Oral Daily Fabienne Nunez MD   81 mg at 07/07/22 5562    polyethylene glycol (GLYCOLAX) packet 17 g  17 g Oral BID Fabienne Nunez MD   17 g at 07/15/22 2027    [Held by provider] apixaban (ELIQUIS) tablet 5 mg  5 mg Oral BID Fabienne Nunez MD   5 mg at 07/09/22 2021    atorvastatin (LIPITOR) tablet 20 mg  20 mg Oral Daily Fabienne Nunez MD   20 mg at 07/18/22 0842    sodium chloride flush 0.9 % injection 5-40 mL  5-40 mL IntraVENous 2 times per day Fabienne Nunez MD   10 mL at 07/18/22 2105    0.9 % sodium chloride infusion   IntraVENous PRN Fabienne Nunez MD 10 mL/hr at 07/12/22 0229 New Bag at 07/12/22 0229    acetaminophen (TYLENOL) tablet 650 mg  650 mg Oral Q6H PRN Fabienne Nunez MD   650 mg at 07/12/22 0835    Or    acetaminophen (TYLENOL) suppository 650 mg  650 mg Rectal Q6H PRN Scout Cordova MD        glucose chewable tablet 16 g  4 tablet Oral PRN Scout Cordova MD        dextrose bolus 10% 125 mL  125 mL IntraVENous CHRISTINEN Scout Cordova MD        Or    dextrose bolus 10% 250 mL  250 mL IntraVENous PRN Scout Cordova MD        glucagon (rDNA) injection 1 mg  1 mg IntraMUSCular PRN Scout Cordova MD        dextrose 5 % solution  100 mL/hr IntraVENous PRN Scout Cordova MD           Allergies: Allergies   Allergen Reactions    Ciprofloxacin Nausea And Vomiting    Codeine Itching     Creepy crawly \" feelings       Problem List:    Patient Active Problem List   Diagnosis Code    Depression with anxiety F41.8    Osteoporosis M81.0    Asthma J45.909    Hyperlipidemia E78.5    Mitral regurgitation I34.0    Obstructive sleep apnea syndrome G47.33    Psoriasis L40.9    Diabetes mellitus ([facility] E11.9    Parkinson's disease ([facility] G20    Primary hypertension I10    Microalbuminuria R80.9    Morbid obesity (HCC) E66.01    Restless leg syndrome G25.81    Generalized weakness R53.1    Inability to walk R26.2    Hypothyroidism E03.9    Chest pain R07.9    Acute asthma exacerbation J45. 901    Asthma exacerbation, mild J45. [place] onset a-fib (HCC) I48.91    Atrial fibrillation with rapid ventricular response (HCC) I48.91    Acute on chronic congestive heart failure (HCC) I50.9    Coronary artery disease involving native coronary artery of native heart without angina pectoris I25.10    Dysphagia R13.10    Hepatosplenomegaly R16.2    Heart failure (HCC) I50.9    Acute diastolic (congestive) heart failure (HCC) I50.31    Nonrheumatic tricuspid valve regurgitation I36.1    Tobacco abuse Z72.0    Recurrent syncope R55    Septic shock (HCC) A41.9, R65.21    Seizure-like activity (HCC) R56.9    SHANTANU (acute kidney injury) ([facility] N17.9    Pancytopenia (HCC) J58.850    Thrombocytopenia (HCC) D69.6    Encephalopathy acute G93.40    Acute respiratory failure with hypoxia (HCC) J96.01    Lactic acidosis E87.2    Delirium R41.0       Past Medical History:        Diagnosis Date    [facility]     Acute on chronic congestive heart failure (HCC)     Anxiety     Asthma     CAD (coronary artery disease) 01/21/2016    [facility]  breast ca 2006    right lumpectomy    Chronic kidney disease     nephrolithiasis    Depression     Diabetes mellitus [facility]     H/O mammogram     Hx MRSA infection     toe infection january 2012    Hyperlipidemia     Hypertension     Lateral epicondylitis     SERENA on CPAP     Parkinson's disease ([facility]     Tubal ligation status        Past Surgical History:        Procedure Laterality Date    BREAST LUMPECTOMY      BREAST REDUCTION SURGERY      CARDIAC CATHETERIZATION  4/28/2014    Dr. Benny Trivedi  01/11/2022    Dr. Dustin García  7/29/15    CT GUIDED CHEST TUBE  7/8/2022    CT GUIDED CHEST TUBE 7/8/2022 Shane Wagner MD [facility]    ENDOSCOPY, COLON, DIAGNOSTIC  7/19/15    GALLBLADDER SURGERY      LUMBAR LAMINECTOMY      TOE AMPUTATION      TONSILLECTOMY      UPPER GASTROINTESTINAL ENDOSCOPY         Social History:    Social History     Tobacco Use    Smoking status: Never    Smokeless tobacco: Never   Substance Use Topics    Alcohol use: No                                Counseling given: Not Answered      Vital Signs (Current): There were no vitals filed for this visit.                                            BP Readings from Last 3 Encounters:   07/19/22 (!) 110/58   05/16/22 (!) 112/59   04/19/22 118/63       NPO Status:  >8 hrs                                                                               BMI:   Wt Readings from Last 3 Encounters:   07/14/22 238 lb (108 kg)   07/06/22 230 lb (104.3 kg)   05/17/22 227 lb 1.6 oz (103 kg)     There is no height or weight on file to calculate BMI.    CBC:   Lab Results   Component Value Date/Time    WBC 6.0 07/19/2022 04:36 AM    RBC 2.93 07/19/2022 04:36 AM    HGB 8.0 07/19/2022 04:36 AM    HCT 26.9 07/19/2022 04:36 AM    MCV 91.8 07/19/2022 04:36 AM    RDW 17.2 07/19/2022 04:36 AM     07/19/2022 04:36 AM       CMP:   Lab Results   Component Value Date/Time     07/19/2022 04:36 AM    K 4.1 07/19/2022 04:36 AM    K 5.0 07/06/2022 02:41 AM    CL 97 07/19/2022 04:36 AM    CO2 35 07/19/2022 04:36 AM    BUN 20 07/19/2022 04:36 AM    CREATININE 1.0 07/19/2022 04:36 AM    GFRAA >60 07/19/2022 04:36 AM    LABGLOM 54 07/19/2022 04:36 AM    GLUCOSE 173 07/19/2022 04:36 AM    PROT 6.0 07/19/2022 04:36 AM    CALCIUM 8.6 07/19/2022 04:36 AM    BILITOT 0.9 07/19/2022 04:36 AM    ALKPHOS 76 07/19/2022 04:36 AM    AST 8 07/19/2022 04:36 AM    ALT <5 07/19/2022 04:36 AM       POC Tests: No results for input(s): POCGLU, POCNA, POCK, POCCL, POCBUN, POCHEMO, POCHCT in the last 72 hours. Coags:   Lab Results   Component Value Date/Time    PROTIME 14.3 07/19/2022 04:36 AM    INR 1.3 07/19/2022 04:36 AM    APTT 28.8 07/19/2022 04:36 AM       HCG (If Applicable): No results found for: PREGTESTUR, PREGSERUM, HCG, HCGQUANT     ABGs: No results found for: PHART, PO2ART, GHV2WFI, BBP0POV, BEART, M3ROLNBA     Type & Screen (If Applicable):  No results found for: LABABO, LABRH    Drug/Infectious Status (If Applicable):  No results found for: HIV, HEPCAB    COVID-19 Screening (If Applicable):   Lab Results   Component Value Date/Time    COVID19 Not Detected 07/16/2022 06:00 PM     EKG 4/18/2022  Sinus bradycardia  Low voltage QRS  Nonspecific ST and T wave abnormality  Abnormal ECG  When compared with ECG of 15-APR-2022 02:45,  Atrial fibrillation resolved  Confirmed by Marylee Hilding ([id number]) on 4/18/2022 11:16:58 AM    ECHO 4/18/2022   Summary   Normal left ventricle size and systolic function. Ejection fraction is visually estimated at 60%.    No regional wall motion abnormalities seen. Moderately dilated left atrium. No evidence of thrombus within left atrium or appendage. Agitated saline injected for shunt evaluation. No evidence of patent foramen ovale on bubble study. NAIF emptying velocity 18 cm/sec. Normal right ventricular size and function. Mild mitral regurgitation is present. No hemodynamically significant aortic stenosis is present. Mild tricuspid regurgitation. RVSP is 32 mmHg. Normal estimated PA systolic pressure. No evidence for hemodynamically significant pericardial effusion. Anesthesia Evaluation  Patient summary reviewed no history of anesthetic complications:   Airway: Mallampati: III  TM distance: >3 FB   Neck ROM: full  Mouth opening: > = 3 FB   Dental:    (+) upper dentures, lower dentures and edentulous      Pulmonary:   (+) COPD:  shortness of breath:  sleep apnea: on CPAP,  decreased breath sounds wheezes asthma:     (-) not a current smoker          Patient did not smoke on day of surgery. Cardiovascular:  Exercise tolerance: poor (<4 METS),   (+) hypertension: mild, valvular problems/murmurs: MR, CAD: obstructive, dysrhythmias (RVR): atrial fibrillation, CHF (Acute on chronic HFpEF):, MORGAN:, hyperlipidemia      ECG reviewed  Rhythm: irregular  Rate: abnormal  Echocardiogram reviewed    Cleared by cardiology     Beta Blocker:  Dose within 24 Hrs      ROS comment: EKG:  Atrial fibrillation with rapid ventricular response  Low voltage QRS  Nonspecific ST abnormality  Abnormal ECG  When compared with ECG of 13-NOV-2021 19:45,  Atrial fibrillation has replaced Sinus rhythm    ECHO:  Left ventricle is normal in size. Moderate concentric left ventricular hypertrophy. No regional wall motion abnormalities seen. Ejection fraction is visually estimated at 70+/-5%. There is doppler evidence of stage I diastolic dysfunction. Mildly dilated right ventricle.   Right ventricle global systolic function is normal ( TAPSE = 1.8 ). Normal size atria. No significant valvular abnormalities noted     Neuro/Psych:   (+) neuromuscular disease: Parkinson's disease, psychiatric history:depression/anxiety              ROS comment: Ambulatory dysfunction GI/Hepatic/Renal:   (+) GERD: no interval change, liver disease:, renal disease (SHANTANU, Creatinine 1.3 & GFR 40 ): kidney stones and CRI, morbid obesity (Super morbid obesity BMI 45.3 kg/m2 )         ROS comment: Dysphagia. Endo/Other:    (+) Diabetes (A1C 6.9%)Type II DM, using insulin, hypothyroidism, blood dyscrasia (Hbg 11.1 g/dL; Eliquis): anemia and anticoagulation therapy, arthritis (S/P lumbar laminectomy): OA., malignancy/cancer (Breast s/p right lumpectomy). Pt had no PAT visit        ROS comment: RLS  Psoriasis  Osteoporosis Abdominal:   (+) obese ( Super morbid obesity BMI 45.3 kg/m2 ),           Vascular: negative vascular ROS. Other Findings:             Anesthesia Plan      MAC     ASA 4       Induction: intravenous. Anesthetic plan and risks discussed with patient. Plan discussed with attending.                 Raghavendra Rondon MD   7/19/2022

## 2022-07-19 NOTE — PROGRESS NOTES
[facility]  Internal Medicine Residency / [address]    Attending Physician Statement  I have discussed the case, including pertinent history and exam findings with the resident and the team.  I have seen and examined the patient and the key elements of the encounter have been performed by me. I have also personally reviewed imaging studies and labs. I agree with the assessment, plan and orders as documented by the resident. Lasix gtt started overnight. BP, HR have been stable. Clear, adequate UO. Seen post EGD - findings noted. Currently without abdominal pain, recurrence of dark stool. Hgb has been stable. (+) mild epigastric tenderness. Clear breath sounds, currently still in sinus with normal rate. (+) edema of BLE. Assessment:  Acute on chronic anemia. Gastritis and duodenitis seen on EGD (7/19/22), no active bleeding. Acute hypoxic respiratory failure 2/2 to CAP, transudative pleural effusion likely from HFpEF. S/p chest tube insertion, R - still draining, decreased output compared to yesterday. Moderate oropharyngeal dysphagia - dietary recommendation noted  HTN, controlled with current management  RLS, controlled with current management  AF, currently in sinus. Tolerating BB and amiodarone. Septic shock, resolved. Abx course completed. Acute encephalopathy, resolved        Plan:  Discharge planning  Resume ASA and eliquis. Monitor for drop of  hgb, recurrence of bleeding  SLP reassessment  PPI, carafate  Continue holding digoxin, depakote  Monitor chest tube output. Wean O2 as able. Pulmonology following. Continue lasix gtt as recommended by Nephrology. Monitor renal function. Appreciate care from all consult services. Remainder of medical problems as per resident note. Pricilla Andersen MD  Internal Medicine

## 2022-07-19 NOTE — PROGRESS NOTES
[facility] Roberto A. Wellington Katayama, MD Maxine Meyer, MD Raelene Heimlich, MD .  Progress Note      Patient's Name: Baldemar aC  6:48 PM  7/19/2022    Subjective  7/7 : seen in her room intubated mechanically ventilated fio2 40 . LE edema 1-2+ . On levophed . UO ok over last 24 hours     7/8 pt not in her room when coming to see her . She is in IR lab. Case d/w RN     7/9 seen in her room d/w ICU resident   Still on some pressors actually BP low when seeing her   Has CT in place with good drainage . Good UO in response to lasix via genao cath . Fio2 40 PEEP 8       7/10 seen in ICU intubated mechanically ventilated fio2 40 PEEP 8   1-2+ edema in UE and LEs   No pressors  On precedex drip     7/11 : seen in ICU extubated earlier today . BP stable on HFNC    7/12 seen in her room ,extubated , on o2/nc . Chest tube in place . Fevers today and precedex drip put on hold .good UO over last 24 hours     7/13 : seen in her room on o2/nc BP stable clinically doing better still with CT along with genao and fecal system    7/14 sitting in chair comfortable on o2/nc BP stable LE edema dependent . 7/15 ; weak on o2/nc . 7/16: In better spirits today. Denies dyspnea on nasal cannula. Receiving breathing treatment. Ongoing fatigue, generalized weakness and debilitation. Good appetite. Continued marked swelling. Asks about eating potato chips, discussed why this would be a bad idea. 7/18: Orthopnea. Unable to lay flat for EGD. Occasional wheezing. No dyspnea at rest at 30 degrees on nasal cannula. Swelling in her thighs sacrum and abdominal pannus seem worse to her. Very little distal swelling. Hungry and thirsty. Ongoing fatigue and generalized weakness. No chest pain or palpitations    7/19: Sleepy, somnolent though easily  awakened. Thinks her swelling may be a little bit better.     History of Present Ilness:      69-year old female with a past medical history of hypertension, A. packet 17 g, BID  atorvastatin (LIPITOR) tablet 20 mg, Daily  sodium chloride flush 0.9 % injection 5-40 mL, 2 times per day  0.9 % sodium chloride infusion, PRN  acetaminophen (TYLENOL) tablet 650 mg, Q6H PRN   Or  acetaminophen (TYLENOL) suppository 650 mg, Q6H PRN  glucose chewable tablet 16 g, PRN  dextrose bolus 10% 125 mL, PRN   Or  dextrose bolus 10% 250 mL, PRN  glucagon (rDNA) injection 1 mg, PRN  dextrose 5 % solution, PRN      Review of Systems:   Unable to obtain due to clinical conditon . Physical exam:   Vital signs BP (!) 105/57   Pulse 80   Temp (!) 96.2 °F (35.7 °C) (Oral)   Resp 20   Ht 5' (1.524 m)   Wt 238 lb (108 kg)   SpO2 100%   BMI 46.48 kg/m²   Gen : moderate distress  Head : at , nc   Neck : supple , no thyromegaly noted . Eyes : EOMI , PERRLA   CV : tachycardiac 1+ edema in LE   Lungs: good flow heard b/l   Abd : soft , NT , BS + , No Organomegaly appreciated . Skin : soft, dry . Extremities: 2+ pitting edema lower back sacrum and thighs distal lower extremity  Neuro : CN  II-XII grossly intact , no focal neurologic deficit . Psych : cooperative .      Data:   Labs:  CBC with Differential:    Lab Results   Component Value Date/Time    WBC 6.0 07/19/2022 04:36 AM    RBC 2.93 07/19/2022 04:36 AM    HGB 8.0 07/19/2022 04:36 AM    HCT 26.9 07/19/2022 04:36 AM     07/19/2022 04:36 AM    MCV 91.8 07/19/2022 04:36 AM    MCH 27.3 07/19/2022 04:36 AM    MCHC 29.7 07/19/2022 04:36 AM    RDW 17.2 07/19/2022 04:36 AM    NRBC 0.0 03/15/2019 09:10 AM    SEGSPCT 74 08/20/2013 10:45 AM    METASPCT 0.9 07/14/2022 05:31 AM    LYMPHOPCT 13.1 07/19/2022 04:36 AM    MONOPCT 7.2 07/19/2022 04:36 AM    MYELOPCT 2.6 07/14/2022 05:31 AM    EOSPCT 1 06/16/2017 12:00 AM    BASOPCT 0.3 07/19/2022 04:36 AM    MONOSABS 0.43 07/19/2022 04:36 AM    LYMPHSABS 0.78 07/19/2022 04:36 AM    EOSABS 0.12 07/19/2022 04:36 AM    BASOSABS 0.02 07/19/2022 04:36 AM     CMP:    Lab Results   Component Value Date/Time     07/19/2022 04:36 AM    K 4.1 07/19/2022 04:36 AM    K 5.0 07/06/2022 02:41 AM    CL 97 07/19/2022 04:36 AM    CO2 35 07/19/2022 04:36 AM    BUN 20 07/19/2022 04:36 AM    CREATININE 1.0 07/19/2022 04:36 AM    GFRAA >60 07/19/2022 04:36 AM    LABGLOM 54 07/19/2022 04:36 AM    GLUCOSE 173 07/19/2022 04:36 AM    PROT 6.0 07/19/2022 04:36 AM    LABALBU 3.9 07/19/2022 04:36 AM    CALCIUM 8.6 07/19/2022 04:36 AM    BILITOT 0.9 07/19/2022 04:36 AM    ALKPHOS 76 07/19/2022 04:36 AM    AST 8 07/19/2022 04:36 AM    ALT <5 07/19/2022 04:36 AM     Ionized Calcium:  No results found for: IONCA  Magnesium:    Lab Results   Component Value Date/Time    MG 1.8 07/19/2022 04:36 AM     Phosphorus:    Lab Results   Component Value Date/Time    PHOS 2.9 07/19/2022 04:36 AM     U/A:    Lab Results   Component Value Date/Time    COLORU Yellow 07/05/2022 05:43 PM    PHUR 5.5 07/05/2022 05:43 PM    WBCUA 1-3 07/05/2022 05:43 PM    RBCUA NONE 07/05/2022 05:43 PM    RBCUA NONE 03/25/2013 09:00 PM    YEAST RARE 10/27/2015 07:18 PM    BACTERIA FEW 07/05/2022 05:43 PM    CLARITYU Clear 07/05/2022 05:43 PM    SPECGRAV 1.025 07/05/2022 05:43 PM    LEUKOCYTESUR Negative 07/05/2022 05:43 PM    UROBILINOGEN 0.2 07/05/2022 05:43 PM    BILIRUBINUR Negative 07/05/2022 05:43 PM    BLOODU Negative 07/05/2022 05:43 PM    GLUCOSEU Negative 07/05/2022 05:43 PM     Microalbumen/Creatinine ratio:  No components found for: RUCREAT  Iron Saturation:  No components found for: PERCENTFE  TIBC:    Lab Results   Component Value Date/Time    TIBC 152 07/09/2022 08:01 AM     FERRITIN:    Lab Results   Component Value Date/Time    FERRITIN 243 07/09/2022 08:01 AM        Imaging:  XR CHEST PORTABLE   Final Result   No sizable pleural effusion. XR CHEST PORTABLE   Final Result   Mild subsegmental atelectasis in the left lower lobe.          XR CHEST PORTABLE   Final Result   Grossly stable pulmonary opacities in the lower left lung, which may represent atelectasis or pneumonia. XR CHEST PORTABLE   Final Result   1. Patchy right perihilar and left lower lobe airspace disease   2. Status post removal of the endotracheal tube and NG tube      3. There is no pneumothorax. XR CHEST PORTABLE   Final Result   Lines and tubes are stable with no pneumothorax on the right. Some   improvement seen in the region of left lung base in the interval.         XR CHEST PORTABLE   Final Result   The evaluation is limited due to low lung volumes. No interval change compared to prior exam.      Lines and tubes are in unchanged position. XR ABDOMEN FOR NG/OG/NE TUBE PLACEMENT   Final Result   Catheter is in the stomach. XR CHEST PORTABLE   Final Result   Catheter placed with significant right lung aeration improvement and no   pneumothorax         MRI BRAIN WO CONTRAST   Final Result   1. No acute intracranial abnormality. 2. No gross epileptogenic lesion is identified. 3. Bilateral mastoid effusions, which may be due to eustachian tube   dysfunction or otomastoiditis. RECOMMENDATIONS:   Unavailable         CT GUIDED CHEST TUBE   Final Result   Successful uncomplicated  right chest tube placement      Recommendations:   1. Follow-up tube check in 2 weeks   2. Flush tube daily with 5 to 10 mL of sterile saline            XR CHEST PORTABLE   Final Result   1. There is no interval change in the bilateral perihilar and lower lobe   airspace disease more prominent within the right lung. 2. Moderate size right pleural effusion. 3. There is no pneumothorax. US RETROPERITONEAL COMPLETE   Final Result   1. Marked bilateral renal cortical thinning. Echogenic renal parenchyma. No hydronephrosis. 2.  A Diaz catheter appears to be decompressing the bladder. XR CHEST PORTABLE   Final Result   Increasing infiltrate throughout the right lung persistent left basilar   alveolar infiltrate is well.          XR CHEST PORTABLE   Final Result   Adequately positioned right internal jugular central venous line. Otherwise,   no significant change compared to prior exam glued in lines and tubes. US DUP LOWER EXTREMITIES BILATERAL VENOUS   Final Result   Within the visualized vessels there is no evidence for deep venous   thrombosis               XR CHEST PORTABLE   Final Result   1. No significant change. Cardiomegaly with right pleural effusion. 2.  Support tubes remain in appropriate position. CT HEAD WO CONTRAST   Final Result   No acute intracranial abnormality or significant change in the overall   appearance of the brain. MRI would be useful if symptoms persist.      RECOMMENDATIONS:   Unavailable         XR ABDOMEN FOR NG/OG/NE TUBE PLACEMENT   Final Result   Catheter is in the proximal stomach. XR CHEST PORTABLE   Final Result   Adequately positioned endotracheal tube. Nasogastric tube coursing below   diaphragm. Otherwise, stable exam.         CT ABDOMEN PELVIS WO CONTRAST Additional Contrast? None   Final Result   Increasing fluid within the abdomen when compared to the prior study. The   anasarca is also increased in appearance of the prior examination. Mild stranding seen around the mesentery which correlation to clinical lab   values is recommended to exclude possible pancreatitis or volume overload. Overall the appearance of the pancreas itself is grossly unremarkable. There   is only mild stranding seen throughout the mesenteric root. CT CHEST WO CONTRAST   Final Result   Bilateral lung infiltrates with greatest consolidation right lower lobe   probably secondary to atelectasis and or pneumonia. Moderate right pleural effusion. Cardiomegaly and small pericardial effusion. The pericardial effusion is new. Small volume right upper abdominal ascites along with anasarca. This is new.          XR CHEST PORTABLE   Final Result   New opacity on the right which may relate to pleural effusion with adjacent   atelectasis and or infiltrate. Cardiomegaly and pulmonary vascular   congestion implying elevated left heart filling pressures. Assessment    1. Acute kidney injury non oligouric: improved   Baseline cr 0.9  Etiology of SHANTANU due to decrease effective circulating volume in setting of intravascular volume depletion as evident by her need for levophed and her urine lytes with high avidity for cl and Na . Basically bland urine sediment which make GN/vasculitis unlikely   High BUN in part due to steroids   LE edema noted though she is still on levophed    2. Encephalopathy metabolic multifactorial : better     3. Digoxin toxicity s/p digbind    4. Septic shock 2/2 PNA : resolved    5. Right Pleural Effusion with catheter in place for drainage    6.   Edema, marked, multifactorial    SHANTANU resolving     Recommendations  Lasix drip, increase drip rate  Diuril IV x1  If bicarbonate increases tomorrow, acetazolamide  Continue supportive care  Follow labs, UO  Low-sodium diet  Fluid restriction      Electronically signed by Marissa Luis MD on 7/19/2022

## 2022-07-19 NOTE — PROGRESS NOTES
[facility] MD Efraín De Luna MD Zerita Lai, MD .  Progress Note      Patient's Name: Servando Baron  8:49 PM  7/18/2022    Subjective  7/7 : seen in her room intubated mechanically ventilated fio2 40 . LE edema 1-2+ . On levophed . UO ok over last 24 hours     7/8 pt not in her room when coming to see her . She is in IR lab. Case d/w RN     7/9 seen in her room d/w ICU resident   Still on some pressors actually BP low when seeing her   Has CT in place with good drainage . Good UO in response to lasix via genao cath . Fio2 40 PEEP 8       7/10 seen in ICU intubated mechanically ventilated fio2 40 PEEP 8   1-2+ edema in UE and LEs   No pressors  On precedex drip     7/11 : seen in ICU extubated earlier today . BP stable on HFNC    7/12 seen in her room ,extubated , on o2/nc . Chest tube in place . Fevers today and precedex drip put on hold .good UO over last 24 hours     7/13 : seen in her room on o2/nc BP stable clinically doing better still with CT along with genao and fecal system    7/14 sitting in chair comfortable on o2/nc BP stable LE edema dependent . 7/15 ; weak on o2/nc . 7/16: In better spirits today. Denies dyspnea on nasal cannula. Receiving breathing treatment. Ongoing fatigue, generalized weakness and debilitation. Good appetite. Continued marked swelling. Asks about eating potato chips, discussed why this would be a bad idea. 7/18: Orthopnea. Unable to lay flat for EGD. Occasional wheezing. No dyspnea at rest at 30 degrees on nasal cannula. Swelling in her thighs sacrum and abdominal pannus seem worse to her. Very little distal swelling. Hungry and thirsty. Ongoing fatigue and generalized weakness.   No chest pain or palpitations    History of Present Ilness:      69-year old female with a past medical history of hypertension, A. fib, CAD, asthma, HFpEF, CKD, SERENA, hyperlipidemia, type II DM, anxiety/depression, Parkinson's disease, restless leg syndrome who presented in the ED for altered mental status. 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  She was discharged after 2 weeks. According to the patient's family, she was doing okay until few hours prior to admission, patient was noted to be acting different yesterday morning. She was noted to be drowsy    Nephrology asked to see for SHANTANU   I did see pt in ICU she is intubated mechanically ventilated . She is on levophed . She has some LE edema. Vent setting stable .    UO marginal .       Allergies:  Ciprofloxacin and Codeine    Current Medications:    0.9 % sodium chloride infusion, PRN  rOPINIRole (REQUIP) tablet 1 mg, TID  furosemide (LASIX) injection 40 mg, BID  benzocaine-menthol (CEPACOL SORE THROAT) lozenge 1 lozenge, Q2H PRN  pantoprazole (PROTONIX) 40 mg in sodium chloride (PF) 10 mL injection, Q12H  QUEtiapine (SEROQUEL) tablet 25 mg, BID  metoprolol tartrate (LOPRESSOR) tablet 50 mg, BID  melatonin disintegrating tablet 5 mg, Nightly  labetalol (NORMODYNE;TRANDATE) injection 10 mg, Q6H PRN  hydrALAZINE (APRESOLINE) injection 10 mg, Q6H PRN  amiodarone (CORDARONE) tablet 200 mg, Daily  insulin lispro (HUMALOG) injection vial 0-6 Units, 4x Daily AC & HS  carbidopa-levodopa (SINEMET CR)  MG per extended release tablet 2 tablet, TID  ondansetron (ZOFRAN) injection 4 mg, Q6H PRN  budesonide (PULMICORT) nebulizer suspension 500 mcg, BID  levalbuterol (XOPENEX) nebulization 0.63 mg, Q4H PRN  insulin glargine-yfgn (SEMGLEE-YFGN) injection vial 5 Units, Nightly  ipratropium-albuterol (DUONEB) nebulizer solution 1 ampule, Q4H WA  [Held by provider] aspirin chewable tablet 81 mg, Daily  polyethylene glycol (GLYCOLAX) packet 17 g, BID  [Held by provider] apixaban (ELIQUIS) tablet 5 mg, BID  atorvastatin (LIPITOR) tablet 20 mg, Daily  sodium chloride flush 0.9 % injection 5-40 mL, 2 times per day  0.9 % sodium chloride infusion, PRN  acetaminophen (TYLENOL) tablet 650 mg, Q6H PRN   Or  acetaminophen (TYLENOL) suppository 650 mg, Q6H PRN  glucose chewable tablet 16 g, PRN  dextrose bolus 10% 125 mL, PRN   Or  dextrose bolus 10% 250 mL, PRN  glucagon (rDNA) injection 1 mg, PRN  dextrose 5 % solution, PRN      Review of Systems:   Unable to obtain due to clinical conditon . Physical exam:   Vital signs BP (!) 96/52   Pulse 71   Temp 97.8 °F (36.6 °C) (Oral)   Resp 20   Ht 5' (1.524 m)   Wt 238 lb (108 kg)   SpO2 97%   BMI 46.48 kg/m²   Gen : moderate distress  Head : at , nc   Neck : supple , no thyromegaly noted . Eyes : EOMI , PERRLA   CV : tachycardiac 1+ edema in LE   Lungs: good flow heard b/l   Abd : soft , NT , BS + , No Organomegaly appreciated . Skin : soft, dry . Extremities: 2+ pitting edema lower back sacrum and thighs distal lower extremity  Neuro : CN  II-XII grossly intact , no focal neurologic deficit . Psych : cooperative .      Data:   Labs:  CBC with Differential:    Lab Results   Component Value Date/Time    WBC 5.8 07/18/2022 05:39 AM    RBC 3.00 07/18/2022 05:39 AM    HGB 8.1 07/18/2022 06:03 PM    HCT 27.4 07/18/2022 06:03 PM     07/18/2022 05:39 AM    MCV 91.7 07/18/2022 05:39 AM    MCH 27.7 07/18/2022 05:39 AM    MCHC 30.2 07/18/2022 05:39 AM    RDW 16.7 07/18/2022 05:39 AM    NRBC 0.0 03/15/2019 09:10 AM    SEGSPCT 74 08/20/2013 10:45 AM    METASPCT 0.9 07/14/2022 05:31 AM    LYMPHOPCT 16.4 07/18/2022 05:39 AM    MONOPCT 8.1 07/18/2022 05:39 AM    MYELOPCT 2.6 07/14/2022 05:31 AM    EOSPCT 1 06/16/2017 12:00 AM    BASOPCT 0.5 07/18/2022 05:39 AM    MONOSABS 0.47 07/18/2022 05:39 AM    LYMPHSABS 0.95 07/18/2022 05:39 AM    EOSABS 0.10 07/18/2022 05:39 AM    BASOSABS 0.03 07/18/2022 05:39 AM     CMP:    Lab Results   Component Value Date/Time     07/18/2022 05:39 AM    K 3.6 07/18/2022 05:39 AM    K 5.0 07/06/2022 02:41 AM     07/18/2022 05:39 AM    CO2 33 07/18/2022 05:39 AM    BUN 21 07/18/2022 05:39 AM    CREATININE 1.1 07/18/2022 05:39 AM    GFRAA 59 07/18/2022 05:39 AM    LABGLOM 49 07/18/2022 05:39 AM    GLUCOSE 114 07/18/2022 05:39 AM    PROT 5.5 07/18/2022 05:39 AM    LABALBU 3.4 07/18/2022 05:39 AM    CALCIUM 8.4 07/18/2022 05:39 AM    BILITOT 0.9 07/18/2022 05:39 AM    ALKPHOS 71 07/18/2022 05:39 AM    AST 8 07/18/2022 05:39 AM    ALT <5 07/18/2022 05:39 AM     Ionized Calcium:  No results found for: IONCA  Magnesium:    Lab Results   Component Value Date/Time    MG 1.8 07/18/2022 05:39 AM     Phosphorus:    Lab Results   Component Value Date/Time    PHOS 2.3 07/18/2022 05:39 AM     U/A:    Lab Results   Component Value Date/Time    COLORU Yellow 07/05/2022 05:43 PM    PHUR 5.5 07/05/2022 05:43 PM    WBCUA 1-3 07/05/2022 05:43 PM    RBCUA NONE 07/05/2022 05:43 PM    RBCUA NONE 03/25/2013 09:00 PM    YEAST RARE 10/27/2015 07:18 PM    BACTERIA FEW 07/05/2022 05:43 PM    CLARITYU Clear 07/05/2022 05:43 PM    SPECGRAV 1.025 07/05/2022 05:43 PM    LEUKOCYTESUR Negative 07/05/2022 05:43 PM    UROBILINOGEN 0.2 07/05/2022 05:43 PM    BILIRUBINUR Negative 07/05/2022 05:43 PM    BLOODU Negative 07/05/2022 05:43 PM    GLUCOSEU Negative 07/05/2022 05:43 PM     Microalbumen/Creatinine ratio:  No components found for: RUCREAT  Iron Saturation:  No components found for: PERCENTFE  TIBC:    Lab Results   Component Value Date/Time    TIBC 152 07/09/2022 08:01 AM     FERRITIN:    Lab Results   Component Value Date/Time    FERRITIN 243 07/09/2022 08:01 AM        Imaging:  XR CHEST PORTABLE   Final Result   No sizable pleural effusion. XR CHEST PORTABLE   Final Result   Mild subsegmental atelectasis in the left lower lobe. XR CHEST PORTABLE   Final Result   Grossly stable pulmonary opacities in the lower left lung, which may   represent atelectasis or pneumonia. XR CHEST PORTABLE   Final Result   1. Patchy right perihilar and left lower lobe airspace disease   2.   Status post removal of the endotracheal tube and NG tube      3. There is no pneumothorax. XR CHEST PORTABLE   Final Result   Lines and tubes are stable with no pneumothorax on the right. Some   improvement seen in the region of left lung base in the interval.         XR CHEST PORTABLE   Final Result   The evaluation is limited due to low lung volumes. No interval change compared to prior exam.      Lines and tubes are in unchanged position. XR ABDOMEN FOR NG/OG/NE TUBE PLACEMENT   Final Result   Catheter is in the stomach. XR CHEST PORTABLE   Final Result   Catheter placed with significant right lung aeration improvement and no   pneumothorax         MRI BRAIN WO CONTRAST   Final Result   1. No acute intracranial abnormality. 2. No gross epileptogenic lesion is identified. 3. Bilateral mastoid effusions, which may be due to eustachian tube   dysfunction or otomastoiditis. RECOMMENDATIONS:   Unavailable         CT GUIDED CHEST TUBE   Final Result   Successful uncomplicated  right chest tube placement      Recommendations:   1. Follow-up tube check in 2 weeks   2. Flush tube daily with 5 to 10 mL of sterile saline            XR CHEST PORTABLE   Final Result   1. There is no interval change in the bilateral perihilar and lower lobe   airspace disease more prominent within the right lung. 2. Moderate size right pleural effusion. 3. There is no pneumothorax. US RETROPERITONEAL COMPLETE   Final Result   1. Marked bilateral renal cortical thinning. Echogenic renal parenchyma. No hydronephrosis. 2.  A Diaz catheter appears to be decompressing the bladder. XR CHEST PORTABLE   Final Result   Increasing infiltrate throughout the right lung persistent left basilar   alveolar infiltrate is well. XR CHEST PORTABLE   Final Result   Adequately positioned right internal jugular central venous line.   Otherwise,   no significant change compared to prior exam glued in lines and tubes. US DUP LOWER EXTREMITIES BILATERAL VENOUS   Final Result   Within the visualized vessels there is no evidence for deep venous   thrombosis               XR CHEST PORTABLE   Final Result   1. No significant change. Cardiomegaly with right pleural effusion. 2.  Support tubes remain in appropriate position. CT HEAD WO CONTRAST   Final Result   No acute intracranial abnormality or significant change in the overall   appearance of the brain. MRI would be useful if symptoms persist.      RECOMMENDATIONS:   Unavailable         XR ABDOMEN FOR NG/OG/NE TUBE PLACEMENT   Final Result   Catheter is in the proximal stomach. XR CHEST PORTABLE   Final Result   Adequately positioned endotracheal tube. Nasogastric tube coursing below   diaphragm. Otherwise, stable exam.         CT ABDOMEN PELVIS WO CONTRAST Additional Contrast? None   Final Result   Increasing fluid within the abdomen when compared to the prior study. The   anasarca is also increased in appearance of the prior examination. Mild stranding seen around the mesentery which correlation to clinical lab   values is recommended to exclude possible pancreatitis or volume overload. Overall the appearance of the pancreas itself is grossly unremarkable. There   is only mild stranding seen throughout the mesenteric root. CT CHEST WO CONTRAST   Final Result   Bilateral lung infiltrates with greatest consolidation right lower lobe   probably secondary to atelectasis and or pneumonia. Moderate right pleural effusion. Cardiomegaly and small pericardial effusion. The pericardial effusion is new. Small volume right upper abdominal ascites along with anasarca. This is new. XR CHEST PORTABLE   Final Result   New opacity on the right which may relate to pleural effusion with adjacent   atelectasis and or infiltrate.   Cardiomegaly and pulmonary vascular   congestion implying elevated left heart filling pressures. Assessment    1. Acute kidney injury non oligouric: improved   Baseline cr 0.9  Etiology of SHANTANU due to decrease effective circulating volume in setting of intravascular volume depletion as evident by her need for levophed and her urine lytes with high avidity for cl and Na . Basically bland urine sediment which make GN/vasculitis unlikely   High BUN in part due to steroids   LE edema noted though she is still on levophed    2. Encephalopathy metabolic multifactorial : better     3. Digoxin toxicity s/p digbind    4. Septic shock 2/2 PNA : resolved    5. Right Pleural Effusion with catheter in place for drainage    6.   Edema, marked, multifactorial    SHANTANU resolving     Recommendations  Intensify diuretic therapy: Lasix drip  Consider IV Diuril as warranted  Continue supportive care  Follow labs, UO  Low-sodium diet  Fluid restriction      Electronically signed by Andry Mcfarlane MD on 7/18/2022

## 2022-07-19 NOTE — OP NOTE
EGD Op Note    DATE OF PROCEDURE: 7/19/2022     SURGEON: Kirk Knox MD    PREOPERATIVE DIAGNOSIS: anemia     POSTOPERATIVE DIAGNOSIS: Same, moderate diffuse gastritis, and duodenitis,     OPERATION: Procedure(s):  EGD ESOPHAGOGASTRODUODENOSCOPY    ANESTHESIA: Local monitored anesthesia. ESTIMATED BLOOD LOSS: minimal    COMPLICATIONS: None. SPECIMENS:  * No specimens in log *    HISTORY: The patient is a 66y.o. year old female with history of above preop diagnosis. I recommended esophagogastroduodenoscopy with possible biopsy and I explained the risk, benefits, expected outcome, and alternatives to the procedure. Risks included but are not limited to bleeding, infection, respiratory distress, hypotension, and perforation of the esophagus, stomach, or duodenum. Patient understands and is in agreement. PROCEDURE: The patient was given IV conscious sedation per anesthesia. The patient was given supplemental oxygen by nasal cannula. The gastroscope was inserted orally and advanced under direct vision through the esophagus, through the stomach, through the pylorus, and into the duodenum. Findings:  Duodenum:     Descending: normal    Bulb: mild moderate duodenitis     Stomach:  Diffuse gastritis     Esophagus: wnl   GE junction: 40 cm     Larynx: normal    The scope was removed and the patient tolerated the procedure well.      IMPRESSION/PLAN:   PPI carafate, ok for [address] [place] [address] will check serology for h pylori and treat her if +      Kirk Knox MD  07/19/22  8:15 AM

## 2022-07-19 NOTE — CARE COORDINATION
Auth obtained for [facility], now on lasix gtt and chest tube continues to gravity. Messaged team 2, they are holding discharge today. Updated Oswald. Pasrr and ambulette on soft chart. For questions I can be reached at [phone].  Poli Santos, [place]

## 2022-07-19 NOTE — ANESTHESIA PRE PROCEDURE
each by In Vitro route 2 times daily Indications: DISP ACCU-CHEK As needed. Historical Provider, MD   atorvastatin (LIPITOR) 20 MG tablet Take 1 tablet by mouth daily 10/21/19   Josef David MD   insulin aspart (NOVOLOG FLEXPEN) 100 UNIT/ML injection pen INJECT 0-10 UNITS INTO THE SKIN THREE TIMES DAILY(BEFORE MEALS) SLIDING SCALE (MAX DAILY 30 UNITS)  Patient taking differently: Indications: med.  approved 6/1/19-7/29/20 INJECT 0-10 UNITS INTO THE SKIN THREE TIMES DAILY(BEFORE MEALS) SLIDING SCALE (MAX DAILY 30 UNITS) 7/29/19   Josef David MD       Current medications:    Current Facility-Administered Medications   Medication Dose Route Frequency Provider Last Rate Last Admin    magnesium sulfate 2000 mg in 50 mL IVPB premix  2,000 mg IntraVENous Once Regulo Doty MD        furosemide (LASIX) 100 mg in dextrose 5 % 100 mL infusion  10 mg/hr IntraVENous Continuous Estephania MD Cydney 10 mL/hr at 07/19/22 0624 10 mg/hr at 07/19/22 0624    0.9 % sodium chloride infusion   IntraVENous PRN Durga Yanez MD        rOPINIRole (REQUIP) tablet 1 mg  1 mg Oral TID Regulo Doty MD   1 mg at 07/18/22 2104    benzocaine-menthol (CEPACOL SORE THROAT) lozenge 1 lozenge  1 lozenge Oral Q2H PRN Patsy Santiago MD   1 lozenge at 07/16/22 1743    pantoprazole (PROTONIX) 40 mg in sodium chloride (PF) 10 mL injection  40 mg IntraVENous Q12H Durga Yanez MD   40 mg at 07/18/22 2349    QUEtiapine (SEROQUEL) tablet 25 mg  25 mg Oral BID Sara Sanders MD   25 mg at 07/18/22 2105    metoprolol tartrate (LOPRESSOR) tablet 50 mg  50 mg Oral BID Sara Sanders MD   50 mg at 07/18/22 2104    melatonin disintegrating tablet 5 mg  5 mg Oral Nightly Patsy Santiago MD   5 mg at 07/18/22 2104    labetalol (NORMODYNE;TRANDATE) injection 10 mg  10 mg IntraVENous Q6H PRN Sara Sanders MD   10 mg at 07/11/22 0044    hydrALAZINE (APRESOLINE) injection 10 mg  10 mg IntraVENous Q6H PRN Fabienne Nunez MD   10 mg at 07/11/22 0442    amiodarone (CORDARONE) tablet 200 mg  200 mg Oral Daily Fabienne Nunez MD   200 mg at 07/18/22 0842    insulin lispro (HUMALOG) injection vial 0-6 Units  0-6 Units SubCUTAneous 4x Daily AC & HS Fabienne Nunez MD   1 Units at 07/18/22 2104    carbidopa-levodopa (SINEMET CR)  MG per extended release tablet 2 tablet  2 tablet Oral TID Fabienne Nunez MD   2 tablet at 07/18/22 2104    ondansetron (ZOFRAN) injection 4 mg  4 mg IntraVENous Q6H PRN Fabienne Nunez MD   4 mg at 07/13/22 1436    budesonide (PULMICORT) nebulizer suspension 500 mcg  0.5 mg Nebulization BID Fabienne Nunez MD   500 mcg at 07/18/22 2002    levalbuterol (XOPENEX) nebulization 0.63 mg  0.63 mg Nebulization Q4H PRN Fabienne Nunez MD        insulin glargine-yfgn [facility]) injection vial 5 Units  5 Units SubCUTAneous Nightly Fabienne Nunez MD   5 Units at 07/18/22 2103    ipratropium-albuterol (DUONEB) nebulizer solution 1 ampule  1 ampule Inhalation Q4H WA Fabienne Nunez MD   1 ampule at 07/18/22 2002    [Held by provider] aspirin chewable tablet 81 mg  81 mg Oral Daily Fabienne Nunez MD   81 mg at 07/07/22 5462    polyethylene glycol (GLYCOLAX) packet 17 g  17 g Oral BID Fabienne Nunez MD   17 g at 07/15/22 2027    [Held by provider] apixaban (ELIQUIS) tablet 5 mg  5 mg Oral BID Fabienne Nunez MD   5 mg at 07/09/22 2021    atorvastatin (LIPITOR) tablet 20 mg  20 mg Oral Daily Fabienne Nunez MD   20 mg at 07/18/22 0842    sodium chloride flush 0.9 % injection 5-40 mL  5-40 mL IntraVENous 2 times per day Fabienne Nunez MD   10 mL at 07/18/22 2105    0.9 % sodium chloride infusion   IntraVENous PRN Fabienne Nunez MD 10 mL/hr at 07/12/22 0229 New Bag at 07/12/22 0229    acetaminophen (TYLENOL) tablet 650 mg  650 mg Oral Q6H PRN Fabienne Nunez MD   650 mg at 07/12/22 0835    Or    acetaminophen (TYLENOL) suppository 650 mg  650 mg Rectal Q6H PRN Max Go MD        glucose chewable tablet 16 g  4 tablet Oral PRN Max Go MD        dextrose bolus 10% 125 mL  125 mL IntraVENous PRN Max Go MD        Or    dextrose bolus 10% 250 mL  250 mL IntraVENous PRN Max Go MD        glucagon (rDNA) injection 1 mg  1 mg IntraMUSCular PRN Max Go MD        dextrose 5 % solution  100 mL/hr IntraVENous PRN Max Go MD         Facility-Administered Medications Ordered in Other Encounters   Medication Dose Route Frequency Provider Last Rate Last Admin    propofol injection   IntraVENous Continuous PRN Anupama Otero RN 64.8 mL/hr at 07/19/22 0804 100 mcg/kg/min at 07/19/22 0804    lidocaine (cardiac) (XYLOCAINE) injection   IntraVENous PRN Anupama Otero RN   30 mg at 07/19/22 0804    0.9 % sodium chloride infusion   IntraVENous Continuous PRN Anupama Otero RN   Stopped at 07/19/22 7209       Allergies: Allergies   Allergen Reactions    Ciprofloxacin Nausea And Vomiting    Codeine Itching     Creepy crawly \" feelings       Problem List:    Patient Active Problem List   Diagnosis Code    Depression with anxiety F41.8    Osteoporosis M81.0    Asthma J45.909    Hyperlipidemia E78.5    Mitral regurgitation I34.0    Obstructive sleep apnea syndrome G47.33    Psoriasis L40.9    Diabetes mellitus ([facility] E11.9    Parkinson's disease ([facility] G20    Primary hypertension I10    Microalbuminuria R80.9    Morbid obesity (HCC) E66.01    Restless leg syndrome G25.81    Generalized weakness R53.1    Inability to walk R26.2    Hypothyroidism E03.9    Chest pain R07.9    Acute asthma exacerbation J45. 901    Asthma exacerbation, mild J45. [place] onset a-fib ([facility]) I48.91    Atrial fibrillation with rapid ventricular response ([facility]) I48.91    Acute on chronic congestive heart failure ([facility]) I50.9    Coronary artery disease involving native coronary artery of native heart without angina pectoris I25.10    Dysphagia R13.10    Hepatosplenomegaly R16.2    Heart failure (HCC) I50.9    Acute diastolic (congestive) heart failure (HCC) I50.31    Nonrheumatic tricuspid valve regurgitation I36.1    Tobacco abuse Z72.0    Recurrent syncope R55    Septic shock (HCC) A41.9, R65.21    Seizure-like activity (HCC) R56.9    SHANTANU (acute kidney injury) ([facility] N17.9    Pancytopenia (HCC) D61.818    Thrombocytopenia (HCC) D69.6    Encephalopathy acute G93.40    Acute respiratory failure with hypoxia (HCC) J96.01    Lactic acidosis E87.2    Delirium R41.0       Past Medical History:        Diagnosis Date    A-fib ([facility] [facility]     Acute on chronic congestive heart failure (HCC)     Anxiety     Asthma     CAD (coronary artery disease) 01/21/2016    Cancer ([facility]  breast ca 2006    right lumpectomy    Chronic kidney disease     nephrolithiasis    Depression     Diabetes mellitus ([facility] [address] [facility]     H/O mammogram     Hx MRSA infection     toe infection january 2012    Hyperlipidemia     Hypertension     Lateral epicondylitis     SERENA on CPAP     Parkinson's disease ([facility] [address] [facility]     Tubal ligation status        Past Surgical History:        Procedure Laterality Date    BREAST LUMPECTOMY      BREAST REDUCTION SURGERY      CARDIAC CATHETERIZATION  4/28/2014    Dr. Chacha Rivera  01/11/2022    Dr. Major Ibarra  7/29/15    CT GUIDED CHEST TUBE  7/8/2022    CT GUIDED CHEST TUBE 7/8/2022 Carlene Singh MD [facility] CT    ENDOSCOPY, COLON, DIAGNOSTIC  7/19/15    GALLBLADDER SURGERY      LUMBAR LAMINECTOMY      TOE AMPUTATION      TONSILLECTOMY      UPPER GASTROINTESTINAL ENDOSCOPY         Social History:    Social History     Tobacco Use    Smoking status: Never    Smokeless tobacco: Never   Substance Use Topics    Alcohol use:  No                                Counseling given: Not Answered      Vital Signs (Current):   Vitals:    07/18/22 1300 07/18/22 1645 07/18/22 2104 07/19/22 0700   BP:  (!) 96/52 113/62 (!) 110/58   Pulse: 83 71 71 76   Resp: 16 20 17    Temp:  36.6 °C (97.8 °F) 36.8 °C (98.2 °F)    TempSrc:  Oral Temporal    SpO2: 97%      Weight:       Height:                                                  BP Readings from Last 3 Encounters:   07/19/22 (!) 110/58   05/16/22 (!) 112/59   04/19/22 118/63       NPO Status:                                                                                 BMI:   Wt Readings from Last 3 Encounters:   07/14/22 238 lb (108 kg)   07/06/22 230 lb (104.3 kg)   05/17/22 227 lb 1.6 oz (103 kg)     Body mass index is 46.48 kg/m². CBC:   Lab Results   Component Value Date/Time    WBC 6.0 07/19/2022 04:36 AM    RBC 2.93 07/19/2022 04:36 AM    HGB 8.0 07/19/2022 04:36 AM    HCT 26.9 07/19/2022 04:36 AM    MCV 91.8 07/19/2022 04:36 AM    RDW 17.2 07/19/2022 04:36 AM     07/19/2022 04:36 AM       CMP:   Lab Results   Component Value Date/Time     07/19/2022 04:36 AM    K 4.1 07/19/2022 04:36 AM    K 5.0 07/06/2022 02:41 AM    CL 97 07/19/2022 04:36 AM    CO2 35 07/19/2022 04:36 AM    BUN 20 07/19/2022 04:36 AM    CREATININE 1.0 07/19/2022 04:36 AM    GFRAA >60 07/19/2022 04:36 AM    LABGLOM 54 07/19/2022 04:36 AM    GLUCOSE 173 07/19/2022 04:36 AM    PROT 6.0 07/19/2022 04:36 AM    CALCIUM 8.6 07/19/2022 04:36 AM    BILITOT 0.9 07/19/2022 04:36 AM    ALKPHOS 76 07/19/2022 04:36 AM    AST 8 07/19/2022 04:36 AM    ALT <5 07/19/2022 04:36 AM       POC Tests: No results for input(s): POCGLU, POCNA, POCK, POCCL, POCBUN, POCHEMO, POCHCT in the last 72 hours.     Coags:   Lab Results   Component Value Date/Time    PROTIME 14.3 07/19/2022 04:36 AM    INR 1.3 07/19/2022 04:36 AM    APTT 28.8 07/19/2022 04:36 AM       HCG (If Applicable): No results found for: PREGTESTUR, PREGSERUM, HCG, HCGQUANT     ABGs: No results found for: PHART, PO2ART, REX0MXN, XLT0ZTI, BEART, Q1OYUHDJ     Type & Screen (If Applicable):  No results found for: LABABO, LABRH    Drug/Infectious Status (If Applicable):  No results found for: HIV, HEPCAB    COVID-19 Screening (If Applicable):   Lab Results   Component Value Date/Time    COVID19 Not Detected 07/16/2022 06:00 PM           Anesthesia Evaluation     Anesthesia Plan        RASHARD cardozo - REY   7/19/2022

## 2022-07-19 NOTE — ANESTHESIA POSTPROCEDURE EVALUATION
Department of Anesthesiology  Postprocedure Note    Patient: Jeannie Montalvo  MRN: [id number]  YOB: 1950  Date of evaluation: 7/19/2022      Procedure Summary     Date: 07/19/22 Room / Location: [facility] / [facility]    Anesthesia Start: 8232 Anesthesia Stop: 4595    Procedure: EGD ESOPHAGOGASTRODUODENOSCOPY Diagnosis:       Gastrointestinal hemorrhage, unspecified gastrointestinal hemorrhage type      (/)    Surgeons: Jorden Oshea MD Responsible Provider: Tiffanie Silverio MD    Anesthesia Type: Not recorded ASA Status: 4          Anesthesia Type: No value filed.     Ifeoma Phase I: Ifeoma Score: 9    Ifeoma Phase II:        Anesthesia Post Evaluation    Patient location during evaluation: PACU  Patient participation: complete - patient participated  Level of consciousness: awake  Pain score: 0  Nausea & Vomiting: no nausea and no vomiting  Complications: no  Cardiovascular status: hemodynamically stable  Respiratory status: acceptable  Hydration status: euvolemic

## 2022-07-19 NOTE — PROGRESS NOTES
GENERAL SURGERY  DAILY PROGRESS NOTE  7/19/2022    Chief Complaint   Patient presents with    Altered Mental Status     per ems family reports ams x 45 minutes, recent psych med changes and right toe amputation       Subjective:  No complaints overnight. No abdominal pain. Tolerated a diet yesterday. Hemoglobin stable. Objective:  /62   Pulse 71   Temp 98.2 °F (36.8 °C) (Temporal)   Resp 17   Ht 5' (1.524 m)   Wt 238 lb (108 kg)   SpO2 97%   BMI 46.48 kg/m²     GENERAL:  Laying in bed, awake, alert, cooperative, no apparent distress  HEAD: Normocephalic, atraumatic  EYES: No sclera icterus, pupils equal  LUNGS:  No increased work of breathing. Chest tube in place with serous drainage   CARDIOVASCULAR:  RR  ABDOMEN:  Soft, non-tender, non-distended  EXTREMITIES: No edema or swelling  SKIN: Warm and dry    Assessment/Plan:  66 y.o. female with acute on chronic anemia    - EGD today  - NPO for procedure, ok for diet after  - monitor H/H, has been stable  - PPI  - hold anticoagulation     Electronically signed by Andres Glaeana MD on 7/19/2022 at 6:15 AM          Attending Attestation   Patient seen and examined, agree with resident note except for changes made by me, for remaining HP/Consult/progress note details please see resident HP/Consult/progress note. Admitted with AMS, CHF exacerbation, chronic anemia hx of ulcers.       EGD     Luiz Wu MD

## 2022-07-19 NOTE — PROGRESS NOTES
[facility]  Department of Internal Medicine  Division of Pulmonary, Critical Care and Sleep Medicine  Progress Note    Melissa Hale DO, MPH, [facility], [address] [facility] [address] , Wilmar Quezada MD      Patient: Servando Baron  MRN: [id number]  : 1950    Encounter Time: 5:51 PM     Date of Admission: 2022 12:25 PM    Primary Care Physician: No primary care provider on file. Reason for Consultation: Drain CT management     SUBJECTIVE:    Lee Bermeo this drain make no sense if she has only HFpEF  Lets resent the fluid for complete analysis   Bumex gtt   CT output noted    OBJECTIVE:     PHYSICAL EXAM:   VITALS:   Vitals:    22 0845 22 0900 22 0925 22 1615   BP: 120/74 125/71 112/69 (!) 105/57   Pulse: 74 73 80    Resp: 20 22 20    Temp:  97.6 °F (36.4 °C) (!) 96.2 °F (35.7 °C)    TempSrc:   Oral    SpO2: 100% 100%     Weight:       Height:            Intake/Output Summary (Last 24 hours) at 2022 1751  Last data filed at 2022 1430  Gross per 24 hour   Intake 364.17 ml   Output 1790 ml   Net -1425.83 ml          CONSTITUTIONAL:    Alert  SKIN:     No rash,   HEENT:     EOMI, MMM, No thrush  NECK:    No bruits, No JVP appreciated  CV:      Sinus,  No murmur, No rubs, No gallops  PULMONARY:   Couse BS,  No Wheezing, No Rales, No Rhonchi      CT noted  ABDOMEN:     Soft, non-tender. BS normal. No R/R/G  EXT:    No deformities . No clubbing. Edema ower extremity edema, No venous stasis  PULSE:   Appears equal and palpable.   PSYCHIATRIC:  Seems appropriate, No acute psychosis  MS:    Gross weakness  NEUROLOGIC:   The clinical assessment is non-focal     DATA: IMAGING & TESTING:     LABORATORY TESTS:    CBC:   Lab Results   Component Value Date/Time    WBC 6.0 2022 04:36 AM    RBC 2.93 2022 04:36 AM    HGB 8.0 2022 04:36 AM    HCT 26.9 2022 04:36 AM    MCV 91.8 2022 04:36 AM    MCH 27.3 07/19/2022 04:36 AM    MCHC 29.7 07/19/2022 04:36 AM    RDW 17.2 07/19/2022 04:36 AM     07/19/2022 04:36 AM    MPV 10.8 07/19/2022 04:36 AM     CMP:    Lab Results   Component Value Date/Time     07/19/2022 04:36 AM    K 4.1 07/19/2022 04:36 AM    K 5.0 07/06/2022 02:41 AM    CL 97 07/19/2022 04:36 AM    CO2 35 07/19/2022 04:36 AM    BUN 20 07/19/2022 04:36 AM    CREATININE 1.0 07/19/2022 04:36 AM    GFRAA >60 07/19/2022 04:36 AM    LABGLOM 54 07/19/2022 04:36 AM    GLUCOSE 173 07/19/2022 04:36 AM    PROT 6.0 07/19/2022 04:36 AM    LABALBU 3.9 07/19/2022 04:36 AM    CALCIUM 8.6 07/19/2022 04:36 AM    BILITOT 0.9 07/19/2022 04:36 AM    ALKPHOS 76 07/19/2022 04:36 AM    AST 8 07/19/2022 04:36 AM    ALT <5 07/19/2022 04:36 AM     Calcium:    Lab Results   Component Value Date/Time    CALCIUM 8.6 07/19/2022 04:36 AM     Ionized Calcium:  No results found for: IONCA  Magnesium:    Lab Results   Component Value Date/Time    MG 1.8 07/19/2022 04:36 AM     Phosphorus:    Lab Results   Component Value Date/Time    PHOS 2.9 07/19/2022 04:36 AM     PT/INR:    Lab Results   Component Value Date/Time    PROTIME 14.3 07/19/2022 04:36 AM    INR 1.3 07/19/2022 04:36 AM        PRO-BNP:   Lab Results   Component Value Date    PROBNP 37,248 (H) 07/12/2022    PROBNP 41,368 (H) 07/06/2022      ABGs:   Lab Results   Component Value Date/Time    PH 7.452 07/11/2022 05:07 AM    PO2 133.6 07/11/2022 05:07 AM    PCO2 40.0 07/11/2022 05:07 AM     Hemoglobin A1C: No components found for: HGBA1C    IMAGING:  Imaging tests were completed and reviewed and discussed radiology and care team involved and reveals   Echo Complete    Result Date: 7/7/2022  Findings   Left Ventricle  Micro-bubble contrast injected to enhance left ventricular visualization. Normal left ventricular size and systolic function. Ejection fraction is visually estimated at 70-75%. Indeterminate diastolic function. No regional wall motion abnormalities seen. Mild left ventricular concentric hypertrophy noted. Right Ventricle  Moderately dilated right ventricle. Right ventricle global systolic function is reduced. TAPSE 1.1 cm. Left Atrium  The left atrium is moderate-severely dilated. JUANY 48 ml/m2. The interatrial septum appears intact. Right Atrium  Mildly enlarged right atrium. Mitral Valve  Structurally normal mitral valve. No evidence of mitral valve stenosis. Physiologic mitral regurgitation. Tricuspid Valve  The tricuspid valve appears structurally normal.  Mild tricuspid regurgitation. RVSP is at least 60 mmHg. Aortic Valve  Individual aortic valve leaflets are not clearly visualized. No hemodynamically significant aortic stenosis is present. No evidence of aortic valve regurgitation. Pulmonic Valve  The pulmonic valve was not well visualized. No evidence of pulmonic valve stenosis. No evidence of any pulmonic regurgitation. Pericardial Effusion  Prominent pericardial fat pad. No definitive evidence of pericardial effusion. Aorta  Aortic root dimension within normal limits. Miscellaneous  Dilated Inferior Vena Cava. Conclusions   Summary  Technically difficult study - limited visualization. Micro-bubble contrast injected to enhance left ventricular visualization. Normal left ventricular size and systolic function. Ejection fraction is visually estimated at 70-75%. Indeterminate diastolic function. No regional wall motion abnormalities seen. Mild left ventricular concentric hypertrophy noted. Moderately dilated right ventricle with reduced function. Biatrial dilation. Mild tricuspid regurgitation. RVSP is at least 60 mmHg. Prominent pericardial fat pad. No definitive evidence of pericardial effusion. Result Date: 7/5/2022  FINDINGS: Lower Chest: See the CT report of the chest dated the same date for full details. Organs: The liver is grossly unremarkable. The spleen is unremarkable.  Gallbladder is been surgically removed. The pancreas reveals some mild fatty replacement. Both adrenal glands are within normal limits. The kidneys are unremarkable in appearance. GI/Bowel: Stomach is within normal limits. No mucosal abnormality. Stool seen scattered diffusely throughout the colon. There is no wall thickening. No mucosal abnormality or distension of the small bowel. Pelvis: Pelvic organs reveal mild wall thickening of the bladder with incomplete distension. The uterus is grossly unremarkable. Peritoneum/Retroperitoneum: There is no abdominal retroperitoneal lymphadenopathy. There is mild stranding identified in the root of the mesentery as well as surrounding the pancreas in which mild inflammation of the pancreas cannot be completely excluded. Correlation to clinical lab values is recommended. No pelvic adenopathy with moderate atherosclerotic disease seen within the abdominal aorta and iliac vessels. Free fluid identified throughout the upper abdomen. Extensive vascular calcifications seen within the mesenteric vessels as well as the abdominal aorta. Bones/Soft Tissues: The bony structures reveal extensive degenerative changes seen within the spine and pelvis. Severe degenerative changes seen within the sacroiliac joints bilaterally right greater than left some sclerosis on the right to suggest prior healed sacroiliitis. There is extensive anasarca seen within the subcutaneous tissues. Increasing fluid within the abdomen when compared to the prior study. The anasarca is also increased in appearance of the prior examination. Mild stranding seen around the mesentery which correlation to clinical lab values is recommended to exclude possible pancreatitis or volume overload. Overall the appearance of the pancreas itself is grossly unremarkable. There is only mild stranding seen throughout the mesenteric root.      CT HEAD WO CONTRAST  Result Date: 7/5/2022  No acute intracranial abnormality or significant change in the overall appearance of the brain. MRI would be useful if symptoms persist. RECOMMENDATIONS: Unavailable     CT CHEST WO CONTRAST    Result Date: 7/5/2022  FINDINGS: Mediastinum: The cardiac size is enlarged. There is a small pericardial effusion. Minimal probable gas in the right atrium is likely secondary to recent intravenous injection. The esophagus is normal.  No definite mediastinal masses or lymphadenopathy. Lungs/pleura: There is consolidation posterior right upper lobe and right middle lobe with linear density in the lingula and at the left posterior lung base. There is right lower lobe consolidation and moderate right pleural effusion. Upper Abdomen: There is a small volume of right upper quadrant ascites. There are surgical clips in the gallbladder fossa and right upper anterior abdominal wall. Soft Tissues/Bones: There is mild induration of the subcutaneous fat. There is moderate lower thoracic spondylosis with slight levoconvex curvature of the thoracic spine. No definite lytic or blastic osseous lesions. Bilateral lung infiltrates with greatest consolidation right lower lobe probably secondary to atelectasis and or pneumonia. Moderate right pleural effusion. Cardiomegaly and small pericardial effusion. The pericardial effusion is new. Small volume right upper abdominal ascites along with anasarca. This is new. CT GUIDED CHEST TUBE  Result Date: 7/8/2022  [facility] After obtaining informed consent, following the routine sterile prep and drape and after the administration of local anesthesia a needle was inserted into the right pleural space . Serous fluid was aspirated. Subsequently a guidewire was passed through the needle. Subsequently the needle was removed and over the guidewire the tract was dilated. Following dilatation a locking loop catheter was placed. Post procedure CT scan reveals the tube to be in good position. Specimen was sent to the laboratory.  The growth). D1 cefepime  5. Elevated pro-BNP, multifactorial, likely acute decompensated HFpEF, septic shock. 6. Elevated troponin likely 2/2 demand ischemia  7. Permanent atrial fibrillation, s/p DCCV (April and May 2022), now rate controlled. 8. SHANTANU Stage III on CKD multifactorial, pre-renal (hemodynamically mediated, DHN 2/2 diuretic use, poor oral intake); vs ATN 2/2 septic shock. 9. Hypernatremia likely 2/2 over diuresis  10. Metabolic alkalosis likely contraction alkalosis 2/2 diuresis, poor oral intake  11. Pancytopenia likely 2/2 BM suppression 2/2 chronic illness, meds (Zosyn)  12. Acute on chronic macrocytic anemia, multifactorial, likely 2/2 chronic disease, blood draws,  Less likely hemolysis (no schistocytes, haptoglobin wnl) s/p 1 unit PRBC 7/11, FOBT positive today, with pinkish tinge on chest tube output  13. Thrombocytopenia,likely 2/2 #11, less likely HIT (not exposed to heparin products), DIC, hemolysis ruled out (work-up negative)  14. Prolonged INR, ? HFP wnl, no overt bleeding, apixaban and aspirin on hold. Given Vit K yesterday for INR 3.2>2 today  15. Low grade fever, multifactorial, ? meds (Precedex?), CLABSI?, no new leukocytosis, or hemodynamic instability   16. Hx of asthma  17. Hx of SERENA. Not using CPAP at home  18. Hx of HTN. -160`s  19. Hx of CAD  20. Hx of Type 2 DM with neuropathy. On Lantus 10 and Novolog SS at home  21. Hx of depression/anxiety. 22. Hx of restless leg syndrome  23. Hx of Parkinson's disease.  On Sinemet and ropinirole at home, which was discontinued upon discharge at [address]:   Wean oxygen  Recehck all the fluid analysis from the chest tube  If CHest tube fluid <100 daily then remove            Mary Gomez DO DO, MPH, [facility]  Professor of Internal Medicine  Pulmonary, Critical Care and Sleep Medicine

## 2022-07-20 LAB
ALBUMIN SERPL-MCNC: 3.8 G/DL (ref 3.5–5.2)
ALP BLD-CCNC: 79 U/L (ref 35–104)
ALT SERPL-CCNC: <5 U/L (ref 0–32)
ANION GAP SERPL CALCULATED.3IONS-SCNC: 7 MMOL/L (ref 7–16)
APTT: 33.9 SEC (ref 24.5–35.1)
AST SERPL-CCNC: 7 U/L (ref 0–31)
BASOPHILS ABSOLUTE: 0.02 E9/L (ref 0–0.2)
BASOPHILS RELATIVE PERCENT: 0.3 % (ref 0–2)
BILIRUB SERPL-MCNC: 0.9 MG/DL (ref 0–1.2)
BUN BLDV-MCNC: 17 MG/DL (ref 6–23)
CALCIUM SERPL-MCNC: 8.6 MG/DL (ref 8.6–10.2)
CHLORIDE BLD-SCNC: 96 MMOL/L (ref 98–107)
CO2: 38 MMOL/L (ref 22–29)
CREAT SERPL-MCNC: 1 MG/DL (ref 0.5–1)
EOSINOPHILS ABSOLUTE: 0.13 E9/L (ref 0.05–0.5)
EOSINOPHILS RELATIVE PERCENT: 2.1 % (ref 0–6)
FLUID TYPE: NORMAL
GFR AFRICAN AMERICAN: >60
GFR NON-AFRICAN AMERICAN: 54 ML/MIN/1.73
GLUCOSE BLD-MCNC: 161 MG/DL (ref 74–99)
GLUCOSE, FLUID: 168 MG/DL
HCT VFR BLD CALC: 29.5 % (ref 34–48)
HELICOBACTER PYLORI IGG: NORMAL
HEMOGLOBIN: 8.7 G/DL (ref 11.5–15.5)
IMMATURE GRANULOCYTES #: 0.04 E9/L
IMMATURE GRANULOCYTES %: 0.7 % (ref 0–5)
INR BLD: 1.7
LD, FLUID: 106 U/L
LYMPHOCYTES ABSOLUTE: 0.66 E9/L (ref 1.5–4)
LYMPHOCYTES RELATIVE PERCENT: 10.9 % (ref 20–42)
MAGNESIUM: 1.9 MG/DL (ref 1.6–2.6)
MCH RBC QN AUTO: 27.5 PG (ref 26–35)
MCHC RBC AUTO-ENTMCNC: 29.5 % (ref 32–34.5)
MCV RBC AUTO: 93.4 FL (ref 80–99.9)
METER GLUCOSE: 120 MG/DL (ref 74–99)
METER GLUCOSE: 139 MG/DL (ref 74–99)
METER GLUCOSE: 170 MG/DL (ref 74–99)
METER GLUCOSE: 198 MG/DL (ref 74–99)
MONOCYTES ABSOLUTE: 0.34 E9/L (ref 0.1–0.95)
MONOCYTES RELATIVE PERCENT: 5.6 % (ref 2–12)
NEUTROPHILS ABSOLUTE: 4.87 E9/L (ref 1.8–7.3)
NEUTROPHILS RELATIVE PERCENT: 80.4 % (ref 43–80)
PDW BLD-RTO: 17.2 FL (ref 11.5–15)
PHOSPHORUS: 2.8 MG/DL (ref 2.5–4.5)
PLATELET # BLD: 246 E9/L (ref 130–450)
PMV BLD AUTO: 10.9 FL (ref 7–12)
POTASSIUM SERPL-SCNC: 3.6 MMOL/L (ref 3.5–5)
PROTEIN FLUID: 1.8 G/DL
PROTHROMBIN TIME: 18.4 SEC (ref 9.3–12.4)
RBC # BLD: 3.16 E12/L (ref 3.5–5.5)
SODIUM BLD-SCNC: 141 MMOL/L (ref 132–146)
TOTAL PROTEIN: 5.8 G/DL (ref 6.4–8.3)
WBC # BLD: 6.1 E9/L (ref 4.5–11.5)

## 2022-07-20 PROCEDURE — 83615 LACTATE (LD) (LDH) ENZYME: CPT

## 2022-07-20 PROCEDURE — 94640 AIRWAY INHALATION TREATMENT: CPT

## 2022-07-20 PROCEDURE — S5553 INSULIN LONG ACTING 5 U: HCPCS | Performed by: SURGERY

## 2022-07-20 PROCEDURE — 84157 ASSAY OF PROTEIN OTHER: CPT

## 2022-07-20 PROCEDURE — C9113 INJ PANTOPRAZOLE SODIUM, VIA: HCPCS | Performed by: SURGERY

## 2022-07-20 PROCEDURE — 83986 ASSAY PH BODY FLUID NOS: CPT

## 2022-07-20 PROCEDURE — 6360000002 HC RX W HCPCS: Performed by: INTERNAL MEDICINE

## 2022-07-20 PROCEDURE — 85730 THROMBOPLASTIN TIME PARTIAL: CPT

## 2022-07-20 PROCEDURE — 82947 ASSAY GLUCOSE BLOOD QUANT: CPT

## 2022-07-20 PROCEDURE — 36415 COLL VENOUS BLD VENIPUNCTURE: CPT

## 2022-07-20 PROCEDURE — A4216 STERILE WATER/SALINE, 10 ML: HCPCS | Performed by: SURGERY

## 2022-07-20 PROCEDURE — 85025 COMPLETE CBC W/AUTO DIFF WBC: CPT

## 2022-07-20 PROCEDURE — 6360000002 HC RX W HCPCS: Performed by: SURGERY

## 2022-07-20 PROCEDURE — 2580000003 HC RX 258: Performed by: SURGERY

## 2022-07-20 PROCEDURE — 2580000003 HC RX 258: Performed by: INTERNAL MEDICINE

## 2022-07-20 PROCEDURE — 83735 ASSAY OF MAGNESIUM: CPT

## 2022-07-20 PROCEDURE — 6370000000 HC RX 637 (ALT 250 FOR IP): Performed by: SURGERY

## 2022-07-20 PROCEDURE — 82962 GLUCOSE BLOOD TEST: CPT

## 2022-07-20 PROCEDURE — 2060000000 HC ICU INTERMEDIATE R&B

## 2022-07-20 PROCEDURE — 97535 SELF CARE MNGMENT TRAINING: CPT

## 2022-07-20 PROCEDURE — 2580000003 HC RX 258

## 2022-07-20 PROCEDURE — 97530 THERAPEUTIC ACTIVITIES: CPT

## 2022-07-20 PROCEDURE — 85610 PROTHROMBIN TIME: CPT

## 2022-07-20 PROCEDURE — 6370000000 HC RX 637 (ALT 250 FOR IP): Performed by: INTERNAL MEDICINE

## 2022-07-20 PROCEDURE — 87205 SMEAR GRAM STAIN: CPT

## 2022-07-20 PROCEDURE — 84100 ASSAY OF PHOSPHORUS: CPT

## 2022-07-20 PROCEDURE — 87070 CULTURE OTHR SPECIMN AEROBIC: CPT

## 2022-07-20 PROCEDURE — 89051 BODY FLUID CELL COUNT: CPT

## 2022-07-20 PROCEDURE — 80053 COMPREHEN METABOLIC PANEL: CPT

## 2022-07-20 PROCEDURE — 2700000000 HC OXYGEN THERAPY PER DAY

## 2022-07-20 PROCEDURE — 99232 SBSQ HOSP IP/OBS MODERATE 35: CPT | Performed by: INTERNAL MEDICINE

## 2022-07-20 RX ORDER — CHLOROTHIAZIDE SODIUM 500 MG/1
500 INJECTION INTRAVENOUS EVERY 12 HOURS
Status: COMPLETED | OUTPATIENT
Start: 2022-07-20 | End: 2022-07-20

## 2022-07-20 RX ORDER — PANTOPRAZOLE SODIUM 40 MG/1
40 TABLET, DELAYED RELEASE ORAL
Qty: 60 TABLET | Refills: 1 | Status: SHIPPED | OUTPATIENT
Start: 2022-07-20 | End: 2022-08-24 | Stop reason: DRUGHIGH

## 2022-07-20 RX ORDER — SUCRALFATE 1 G/1
1 TABLET ORAL 4 TIMES DAILY
Qty: 120 TABLET | Refills: 1 | Status: SHIPPED | OUTPATIENT
Start: 2022-07-20

## 2022-07-20 RX ORDER — ACETAZOLAMIDE 500 MG/5ML
500 INJECTION, POWDER, LYOPHILIZED, FOR SOLUTION INTRAVENOUS EVERY 12 HOURS
Status: COMPLETED | OUTPATIENT
Start: 2022-07-20 | End: 2022-07-20

## 2022-07-20 RX ORDER — PANTOPRAZOLE SODIUM 40 MG/1
40 TABLET, DELAYED RELEASE ORAL
Status: DISCONTINUED | OUTPATIENT
Start: 2022-07-20 | End: 2022-07-27 | Stop reason: HOSPADM

## 2022-07-20 RX ADMIN — ASPIRIN 81 MG CHEWABLE TABLET 81 MG: 81 TABLET CHEWABLE at 08:33

## 2022-07-20 RX ADMIN — METOPROLOL TARTRATE 50 MG: 50 TABLET, FILM COATED ORAL at 08:33

## 2022-07-20 RX ADMIN — IPRATROPIUM BROMIDE AND ALBUTEROL SULFATE 1 AMPULE: .5; 2.5 SOLUTION RESPIRATORY (INHALATION) at 15:36

## 2022-07-20 RX ADMIN — ACETAZOLAMIDE 500 MG: 500 INJECTION, POWDER, LYOPHILIZED, FOR SOLUTION INTRAVENOUS at 11:13

## 2022-07-20 RX ADMIN — ROPINIROLE HYDROCHLORIDE 1 MG: 1 TABLET, FILM COATED ORAL at 13:58

## 2022-07-20 RX ADMIN — ACETAMINOPHEN 650 MG: 325 TABLET ORAL at 17:23

## 2022-07-20 RX ADMIN — Medication 5 MG: at 20:33

## 2022-07-20 RX ADMIN — WATER 20 ML: 1 INJECTION INTRAMUSCULAR; INTRAVENOUS; SUBCUTANEOUS at 20:34

## 2022-07-20 RX ADMIN — SUCRALFATE 1 G: 1 TABLET ORAL at 05:37

## 2022-07-20 RX ADMIN — AMIODARONE HYDROCHLORIDE 200 MG: 200 TABLET ORAL at 08:33

## 2022-07-20 RX ADMIN — BUDESONIDE 500 MCG: 0.5 SUSPENSION RESPIRATORY (INHALATION) at 08:16

## 2022-07-20 RX ADMIN — BUDESONIDE 500 MCG: 0.5 SUSPENSION RESPIRATORY (INHALATION) at 18:57

## 2022-07-20 RX ADMIN — FUROSEMIDE 20 MG/HR: 10 INJECTION INTRAMUSCULAR; INTRAVENOUS at 09:57

## 2022-07-20 RX ADMIN — PANTOPRAZOLE SODIUM 40 MG: 40 TABLET, DELAYED RELEASE ORAL at 17:23

## 2022-07-20 RX ADMIN — ROPINIROLE HYDROCHLORIDE 1 MG: 1 TABLET, FILM COATED ORAL at 08:33

## 2022-07-20 RX ADMIN — ANORECTAL OINTMENT: 15.7; .44; 24; 20.6 OINTMENT TOPICAL at 08:40

## 2022-07-20 RX ADMIN — WATER: 1 INJECTION INTRAMUSCULAR; INTRAVENOUS; SUBCUTANEOUS at 11:30

## 2022-07-20 RX ADMIN — QUETIAPINE FUMARATE 25 MG: 25 TABLET ORAL at 20:33

## 2022-07-20 RX ADMIN — APIXABAN 5 MG: 5 TABLET, FILM COATED ORAL at 08:33

## 2022-07-20 RX ADMIN — CHLOROTHIAZIDE SODIUM 500 MG: 500 INJECTION, POWDER, LYOPHILIZED, FOR SOLUTION INTRAVENOUS at 20:33

## 2022-07-20 RX ADMIN — SUCRALFATE 1 G: 1 TABLET ORAL at 17:56

## 2022-07-20 RX ADMIN — CARBIDOPA AND LEVODOPA 2 TABLET: 25; 100 TABLET, EXTENDED RELEASE ORAL at 13:58

## 2022-07-20 RX ADMIN — FUROSEMIDE 20 MG/HR: 10 INJECTION INTRAMUSCULAR; INTRAVENOUS at 20:31

## 2022-07-20 RX ADMIN — APIXABAN 5 MG: 5 TABLET, FILM COATED ORAL at 20:33

## 2022-07-20 RX ADMIN — WATER 10 ML: 1 INJECTION INTRAMUSCULAR; INTRAVENOUS; SUBCUTANEOUS at 11:14

## 2022-07-20 RX ADMIN — METOPROLOL TARTRATE 50 MG: 50 TABLET, FILM COATED ORAL at 20:33

## 2022-07-20 RX ADMIN — QUETIAPINE FUMARATE 25 MG: 25 TABLET ORAL at 08:33

## 2022-07-20 RX ADMIN — SODIUM CHLORIDE, PRESERVATIVE FREE 10 ML: 5 INJECTION INTRAVENOUS at 20:33

## 2022-07-20 RX ADMIN — ANORECTAL OINTMENT: 15.7; .44; 24; 20.6 OINTMENT TOPICAL at 03:21

## 2022-07-20 RX ADMIN — SUCRALFATE 1 G: 1 TABLET ORAL at 23:30

## 2022-07-20 RX ADMIN — SODIUM CHLORIDE, PRESERVATIVE FREE 10 ML: 5 INJECTION INTRAVENOUS at 08:39

## 2022-07-20 RX ADMIN — SUCRALFATE 1 G: 1 TABLET ORAL at 12:18

## 2022-07-20 RX ADMIN — ROPINIROLE HYDROCHLORIDE 1 MG: 1 TABLET, FILM COATED ORAL at 20:33

## 2022-07-20 RX ADMIN — CARBIDOPA AND LEVODOPA 2 TABLET: 25; 100 TABLET, EXTENDED RELEASE ORAL at 20:33

## 2022-07-20 RX ADMIN — ACETAZOLAMIDE 500 MG: 500 INJECTION, POWDER, LYOPHILIZED, FOR SOLUTION INTRAVENOUS at 20:33

## 2022-07-20 RX ADMIN — IPRATROPIUM BROMIDE AND ALBUTEROL SULFATE 1 AMPULE: .5; 2.5 SOLUTION RESPIRATORY (INHALATION) at 18:57

## 2022-07-20 RX ADMIN — FUROSEMIDE 20 MG/HR: 10 INJECTION INTRAMUSCULAR; INTRAVENOUS at 02:48

## 2022-07-20 RX ADMIN — PANTOPRAZOLE SODIUM 40 MG: 40 INJECTION, POWDER, FOR SOLUTION INTRAVENOUS at 11:11

## 2022-07-20 RX ADMIN — INSULIN LISPRO 1 UNITS: 100 INJECTION, SOLUTION INTRAVENOUS; SUBCUTANEOUS at 20:32

## 2022-07-20 RX ADMIN — INSULIN LISPRO 1 UNITS: 100 INJECTION, SOLUTION INTRAVENOUS; SUBCUTANEOUS at 11:24

## 2022-07-20 RX ADMIN — INSULIN GLARGINE-YFGN 5 UNITS: 100 INJECTION, SOLUTION SUBCUTANEOUS at 20:32

## 2022-07-20 RX ADMIN — IPRATROPIUM BROMIDE AND ALBUTEROL SULFATE 1 AMPULE: .5; 2.5 SOLUTION RESPIRATORY (INHALATION) at 08:16

## 2022-07-20 RX ADMIN — ATORVASTATIN CALCIUM 20 MG: 20 TABLET, FILM COATED ORAL at 08:33

## 2022-07-20 RX ADMIN — CARBIDOPA AND LEVODOPA 2 TABLET: 25; 100 TABLET, EXTENDED RELEASE ORAL at 08:33

## 2022-07-20 RX ADMIN — ANORECTAL OINTMENT: 15.7; .44; 24; 20.6 OINTMENT TOPICAL at 20:34

## 2022-07-20 RX ADMIN — CHLOROTHIAZIDE SODIUM 500 MG: 500 INJECTION, POWDER, LYOPHILIZED, FOR SOLUTION INTRAVENOUS at 11:15

## 2022-07-20 NOTE — PROGRESS NOTES
[facility] MD Estephania Umana MD Evelena Maltese, MD .  Progress Note      Patient's Name: Ed Kim  7:37 PM  7/20/2022    Subjective  7/7 : seen in her room intubated mechanically ventilated fio2 40 . LE edema 1-2+ . On levophed . UO ok over last 24 hours     7/8 pt not in her room when coming to see her . She is in IR lab. Case d/w RN     7/9 seen in her room d/w ICU resident   Still on some pressors actually BP low when seeing her   Has CT in place with good drainage . Good UO in response to lasix via genao cath . Fio2 40 PEEP 8       7/10 seen in ICU intubated mechanically ventilated fio2 40 PEEP 8   1-2+ edema in UE and LEs   No pressors  On precedex drip     7/11 : seen in ICU extubated earlier today . BP stable on HFNC    7/12 seen in her room ,extubated , on o2/nc . Chest tube in place . Fevers today and precedex drip put on hold .good UO over last 24 hours     7/13 : seen in her room on o2/nc BP stable clinically doing better still with CT along with genao and fecal system    7/14 sitting in chair comfortable on o2/nc BP stable LE edema dependent . 7/15 ; weak on o2/nc . 7/16: In better spirits today. Denies dyspnea on nasal cannula. Receiving breathing treatment. Ongoing fatigue, generalized weakness and debilitation. Good appetite. Continued marked swelling. Asks about eating potato chips, discussed why this would be a bad idea. 7/18: Orthopnea. Unable to lay flat for EGD. Occasional wheezing. No dyspnea at rest at 30 degrees on nasal cannula. Swelling in her thighs sacrum and abdominal pannus seem worse to her. Very little distal swelling. Hungry and thirsty. Ongoing fatigue and generalized weakness. No chest pain or palpitations    7/19: Sleepy, somnolent though easily  awakened. Thinks her swelling may be a little bit better. 7/20: Feeling better. No dyspnea at rest.  Abdominal pannus swelling seems to be a little better. No peripheral swelling. History of Present Ilness:      69-year old female with a past medical history of hypertension, A. fib, CAD, asthma, HFpEF, CKD, SERENA, hyperlipidemia, type II DM, anxiety/depression, Parkinson's disease, restless leg syndrome who presented in the ED for altered mental status. She recently had a right second toe amputation back last month at [facility].  She was discharged after 2 weeks. According to the patient's family, she was doing okay until few hours prior to admission, patient was noted to be acting different yesterday morning. She was noted to be drowsy    Nephrology asked to see for SHANTANU   I did see pt in ICU she is intubated mechanically ventilated . She is on levophed . She has some LE edema. Vent setting stable .    UO marginal .       Allergies:  Ciprofloxacin and Codeine    Current Medications:    chlorothiazide (DIURIL) injection 500 mg, Q12H  acetaZOLAMIDE (DIAMOX) injection 500 mg, Q12H  pantoprazole (PROTONIX) tablet 40 mg, BID AC  apixaban (ELIQUIS) tablet 5 mg, BID  aspirin chewable tablet 81 mg, Daily  sucralfate (CARAFATE) tablet 1 g, 4 times per day  menthol-zinc oxide (CALMOSEPTINE) 0.44-20.6 % ointment, BID  furosemide (LASIX) 100 mg in dextrose 5 % 100 mL infusion, Continuous  0.9 % sodium chloride infusion, PRN  rOPINIRole (REQUIP) tablet 1 mg, TID  benzocaine-menthol (CEPACOL SORE THROAT) lozenge 1 lozenge, Q2H PRN  QUEtiapine (SEROQUEL) tablet 25 mg, BID  metoprolol tartrate (LOPRESSOR) tablet 50 mg, BID  melatonin disintegrating tablet 5 mg, Nightly  labetalol (NORMODYNE;TRANDATE) injection 10 mg, Q6H PRN  hydrALAZINE (APRESOLINE) injection 10 mg, Q6H PRN  amiodarone (CORDARONE) tablet 200 mg, Daily  insulin lispro (HUMALOG) injection vial 0-6 Units, 4x Daily AC & HS  carbidopa-levodopa (SINEMET CR)  MG per extended release tablet 2 tablet, TID  ondansetron (ZOFRAN) injection 4 mg, Q6H PRN  budesonide (PULMICORT) nebulizer suspension 500 mcg, BID  levalbuterol (XOPENEX) nebulization 0.63 mg, Q4H PRN  insulin glargine-yfgn (SEMGLEE-YFGN) injection vial 5 Units, Nightly  ipratropium-albuterol (DUONEB) nebulizer solution 1 ampule, Q4H WA  polyethylene glycol (GLYCOLAX) packet 17 g, BID  atorvastatin (LIPITOR) tablet 20 mg, Daily  sodium chloride flush 0.9 % injection 5-40 mL, 2 times per day  0.9 % sodium chloride infusion, PRN  acetaminophen (TYLENOL) tablet 650 mg, Q6H PRN   Or  acetaminophen (TYLENOL) suppository 650 mg, Q6H PRN  glucose chewable tablet 16 g, PRN  dextrose bolus 10% 125 mL, PRN   Or  dextrose bolus 10% 250 mL, PRN  glucagon (rDNA) injection 1 mg, PRN  dextrose 5 % solution, PRN      Review of Systems:   Unable to obtain due to clinical conditon . Physical exam:   Vital signs /67   Pulse 81   Temp 97.5 °F (36.4 °C) (Oral)   Resp 20   Ht 5' (1.524 m)   Wt 214 lb (97.1 kg)   SpO2 100%   BMI 41.79 kg/m²   Gen : moderate distress  Head : at , nc   Neck : supple , no thyromegaly noted . Eyes : EOMI , PERRLA   CV : tachycardiac 1+ edema in LE   Lungs: good flow heard b/l   Abd : soft , NT , BS + , No Organomegaly appreciated . Skin : soft, dry . Extremities: 2+ pitting edema lower back sacrum and thighs distal lower extremity  Neuro : CN  II-XII grossly intact , no focal neurologic deficit . Psych : cooperative .      Data:   Labs:  CBC with Differential:    Lab Results   Component Value Date/Time    WBC 6.1 07/20/2022 04:54 AM    RBC 3.16 07/20/2022 04:54 AM    HGB 8.7 07/20/2022 04:54 AM    HCT 29.5 07/20/2022 04:54 AM     07/20/2022 04:54 AM    MCV 93.4 07/20/2022 04:54 AM    MCH 27.5 07/20/2022 04:54 AM    MCHC 29.5 07/20/2022 04:54 AM    RDW 17.2 07/20/2022 04:54 AM    NRBC 0.0 03/15/2019 09:10 AM    SEGSPCT 74 08/20/2013 10:45 AM    METASPCT 0.9 07/14/2022 05:31 AM    LYMPHOPCT 10.9 07/20/2022 04:54 AM    MONOPCT 5.6 07/20/2022 04:54 AM    MYELOPCT 2.6 07/14/2022 05:31 AM    EOSPCT 1 06/16/2017 12:00 AM BASOPCT 0.3 07/20/2022 04:54 AM    MONOSABS 0.34 07/20/2022 04:54 AM    LYMPHSABS 0.66 07/20/2022 04:54 AM    EOSABS 0.13 07/20/2022 04:54 AM    BASOSABS 0.02 07/20/2022 04:54 AM     CMP:    Lab Results   Component Value Date/Time     07/20/2022 04:54 AM    K 3.6 07/20/2022 04:54 AM    K 5.0 07/06/2022 02:41 AM    CL 96 07/20/2022 04:54 AM    CO2 38 07/20/2022 04:54 AM    BUN 17 07/20/2022 04:54 AM    CREATININE 1.0 07/20/2022 04:54 AM    GFRAA >60 07/20/2022 04:54 AM    LABGLOM 54 07/20/2022 04:54 AM    GLUCOSE 161 07/20/2022 04:54 AM    PROT 5.8 07/20/2022 04:54 AM    LABALBU 3.8 07/20/2022 04:54 AM    CALCIUM 8.6 07/20/2022 04:54 AM    BILITOT 0.9 07/20/2022 04:54 AM    ALKPHOS 79 07/20/2022 04:54 AM    AST 7 07/20/2022 04:54 AM    ALT <5 07/20/2022 04:54 AM     Ionized Calcium:  No results found for: IONCA  Magnesium:    Lab Results   Component Value Date/Time    MG 1.9 07/20/2022 04:54 AM     Phosphorus:    Lab Results   Component Value Date/Time    PHOS 2.8 07/20/2022 04:54 AM     U/A:    Lab Results   Component Value Date/Time    COLORU Yellow 07/05/2022 05:43 PM    PHUR 5.5 07/05/2022 05:43 PM    WBCUA 1-3 07/05/2022 05:43 PM    RBCUA NONE 07/05/2022 05:43 PM    RBCUA NONE 03/25/2013 09:00 PM    YEAST RARE 10/27/2015 07:18 PM    BACTERIA FEW 07/05/2022 05:43 PM    CLARITYU Clear 07/05/2022 05:43 PM    SPECGRAV 1.025 07/05/2022 05:43 PM    LEUKOCYTESUR Negative 07/05/2022 05:43 PM    UROBILINOGEN 0.2 07/05/2022 05:43 PM    BILIRUBINUR Negative 07/05/2022 05:43 PM    BLOODU Negative 07/05/2022 05:43 PM    GLUCOSEU Negative 07/05/2022 05:43 PM     Microalbumen/Creatinine ratio:  No components found for: RUCREAT  Iron Saturation:  No components found for: PERCENTFE  TIBC:    Lab Results   Component Value Date/Time    TIBC 152 07/09/2022 08:01 AM     FERRITIN:    Lab Results   Component Value Date/Time    FERRITIN 243 07/09/2022 08:01 AM        Imaging:  XR CHEST PORTABLE   Final Result   No sizable pleural effusion. XR CHEST PORTABLE   Final Result   Mild subsegmental atelectasis in the left lower lobe. XR CHEST PORTABLE   Final Result   Grossly stable pulmonary opacities in the lower left lung, which may   represent atelectasis or pneumonia. XR CHEST PORTABLE   Final Result   1. Patchy right perihilar and left lower lobe airspace disease   2. Status post removal of the endotracheal tube and NG tube      3. There is no pneumothorax. XR CHEST PORTABLE   Final Result   Lines and tubes are stable with no pneumothorax on the right. Some   improvement seen in the region of left lung base in the interval.         XR CHEST PORTABLE   Final Result   The evaluation is limited due to low lung volumes. No interval change compared to prior exam.      Lines and tubes are in unchanged position. XR ABDOMEN FOR NG/OG/NE TUBE PLACEMENT   Final Result   Catheter is in the stomach. XR CHEST PORTABLE   Final Result   Catheter placed with significant right lung aeration improvement and no   pneumothorax         MRI BRAIN WO CONTRAST   Final Result   1. No acute intracranial abnormality. 2. No gross epileptogenic lesion is identified. 3. Bilateral mastoid effusions, which may be due to eustachian tube   dysfunction or otomastoiditis. RECOMMENDATIONS:   Unavailable         CT GUIDED CHEST TUBE   Final Result   Successful uncomplicated  right chest tube placement      Recommendations:   1. Follow-up tube check in 2 weeks   2. Flush tube daily with 5 to 10 mL of sterile saline            XR CHEST PORTABLE   Final Result   1. There is no interval change in the bilateral perihilar and lower lobe   airspace disease more prominent within the right lung. 2. Moderate size right pleural effusion. 3. There is no pneumothorax. US RETROPERITONEAL COMPLETE   Final Result   1. Marked bilateral renal cortical thinning. Echogenic renal parenchyma. No hydronephrosis.       2. A Diaz catheter appears to be decompressing the bladder. XR CHEST PORTABLE   Final Result   Increasing infiltrate throughout the right lung persistent left basilar   alveolar infiltrate is well. XR CHEST PORTABLE   Final Result   Adequately positioned right internal jugular central venous line. Otherwise,   no significant change compared to prior exam glued in lines and tubes. US DUP LOWER EXTREMITIES BILATERAL VENOUS   Final Result   Within the visualized vessels there is no evidence for deep venous   thrombosis               XR CHEST PORTABLE   Final Result   1. No significant change. Cardiomegaly with right pleural effusion. 2.  Support tubes remain in appropriate position. CT HEAD WO CONTRAST   Final Result   No acute intracranial abnormality or significant change in the overall   appearance of the brain. MRI would be useful if symptoms persist.      RECOMMENDATIONS:   Unavailable         XR ABDOMEN FOR NG/OG/NE TUBE PLACEMENT   Final Result   Catheter is in the proximal stomach. XR CHEST PORTABLE   Final Result   Adequately positioned endotracheal tube. Nasogastric tube coursing below   diaphragm. Otherwise, stable exam.         CT ABDOMEN PELVIS WO CONTRAST Additional Contrast? None   Final Result   Increasing fluid within the abdomen when compared to the prior study. The   anasarca is also increased in appearance of the prior examination. Mild stranding seen around the mesentery which correlation to clinical lab   values is recommended to exclude possible pancreatitis or volume overload. Overall the appearance of the pancreas itself is grossly unremarkable. There   is only mild stranding seen throughout the mesenteric root. CT CHEST WO CONTRAST   Final Result   Bilateral lung infiltrates with greatest consolidation right lower lobe   probably secondary to atelectasis and or pneumonia. Moderate right pleural effusion.       Cardiomegaly and small pericardial effusion. The pericardial effusion is new. Small volume right upper abdominal ascites along with anasarca. This is new. XR CHEST PORTABLE   Final Result   New opacity on the right which may relate to pleural effusion with adjacent   atelectasis and or infiltrate. Cardiomegaly and pulmonary vascular   congestion implying elevated left heart filling pressures. Fluoroscopy modified barium swallow with video    (Results Pending)       Assessment    1. Acute kidney injury non oligouric: improved   Baseline cr 0.9  Etiology of SHANTANU due to decrease effective circulating volume in setting of intravascular volume depletion as evident by her need for levophed and her urine lytes with high avidity for cl and Na . Basically bland urine sediment which make GN/vasculitis unlikely   High BUN in part due to steroids   LE edema noted though she is still on levophed    2. Encephalopathy metabolic multifactorial : Improved    3. Digoxin toxicity s/p digbind. Resolved    4. Septic shock 2/2 PNA : resolved    5. Right Pleural Effusion with catheter in place for drainage    6.   Edema, marked, multifactorial    SHANTANU resolved  Massive edema persists, though improved, 3.2 L UO yesterday with Lasix drip and Diuril    Recommendations  Continue Lasix drip  Diuril IV every 12 hours x2 doses  Acetazolamide IV  Continue supportive care  Follow labs, UO  Low-sodium diet  Fluid restriction      Electronically signed by Hugh Terrell MD on 7/20/2022

## 2022-07-20 NOTE — PROGRESS NOTES
[facility]  Internal Medicine Residency / [address]    Attending Physician Statement  I have discussed the case, including pertinent history and exam findings with the resident and the team.  I have seen and examined the patient and the key elements of the encounter have been performed by me. I have also personally reviewed imaging studies and labs. I agree with the assessment, plan and orders as documented by the resident. Excellent response to diuresis. Clinically anasarca is improved. Chest tube output clear, 245cc. Clear breath sounds, no abdominal tenderness. No recurrence of dark stools and hgb has been stable after resuming eliquis. Assessment:  Acute on chronic anemia. Gastritis and duodenitis seen on EGD (7/19/22), no active bleeding. Acute hypoxic respiratory failure 2/2 to CAP, transudative pleural effusion likely from HFpEF. S/p chest tube insertion, R. Remains on lasix gtt + additional dose of diuril last night  Metabolic alkalosis, may be from diuresis (also has SERENA but normal bicarb on admission)  Moderate oropharyngeal dysphagia - dietary recommendation noted  HTN, controlled with current management  RLS, controlled with current management  AF, currently in sinus. Tolerating BB and amiodarone. Septic shock, resolved. Abx course completed. Acute encephalopathy, resolved        Plan:  Noted plan to start acetazolamide per Nephrology  Continue lasix gtt as recommended by Nephrology. Monitor renal function. Continue ASA and eliquis. Monitor for drop of  hgb, recurrence of bleeding  SLP reassessment - may need change in diet  PPI (switch to oral), continue carafate  Continue holding digoxin, depakote  Agree with resending pleural fluid studies - may not need chest tube if effusion is from HF and not 2/2 to treated pneumonia  F/u H pylori surg path  May need oxygen on discharge  Appreciate care from all consult services.          Remainder of medical problems as per resident note. Julio Dahl MD  Internal Medicine

## 2022-07-20 NOTE — PROGRESS NOTES
GENERAL SURGERY  DAILY PROGRESS NOTE  7/20/2022    Chief Complaint   Patient presents with    Altered Mental Status     per ems family reports ams x 45 minutes, recent psych med changes and right toe amputation       Subjective:  No complaints overnight. No abdominal pain. Tolerated a diet yesterday. Hemoglobin stable. EGD found gastroduodenitis    Objective:  /62   Pulse 75   Temp 97.7 °F (36.5 °C) (Oral)   Resp 16   Ht 5' (1.524 m)   Wt 214 lb (97.1 kg)   SpO2 94%   BMI 41.79 kg/m²     GENERAL:  Laying in bed, awake, alert, cooperative, no apparent distress  HEAD: Normocephalic, atraumatic  EYES: No sclera icterus, pupils equal  LUNGS:  No increased work of breathing.  Chest tube in place with serous drainage   CARDIOVASCULAR:  RR  ABDOMEN:  Soft, non-tender, non-distended  EXTREMITIES: No edema or swelling  SKIN: Warm and dry    Assessment/Plan:  66 y.o. female with acute on chronic anemia    - s/p EGD  - continue PPI BID and carafate, prescriptions for outpatient have been sent  - H pylori pending  - diet as tolerated  - monitor H/H, has been stable  - ok to resume anticoagulation  - follow up outpatient with Dr. Tegan Saeed    Electronically signed by Trinity Galeano DO on 7/20/2022 at 7:14 AM

## 2022-07-20 NOTE — PROGRESS NOTES
[facility]  Internal Medicine Residency Program  Progress Note - House Team     Patient:  Chase Washingtoner 66 y.o. female MRN: [id number]     Date of Service: 7/20/2022     CC: altered mental status     Days since admission: 15    Subjective     Overnight events: no significant changes overnight    This morning patient was very alert, awake and talkative in a louder voice than her usual whisper. She denies any headache, vision changes or fever. Her throat is a bit sore from the EGD but otherwise is fine. She denies any chest pain, shortness of breath or abdominal pain. She claims her legs feel better and her abdomen is getting less swollen. Objective     Physical Exam:  Vitals: /70   Pulse 82   Temp 97.9 °F (36.6 °C) (Oral)   Resp (!) 9   Ht 5' (1.524 m)   Wt 214 lb (97.1 kg)   SpO2 94%   BMI 41.79 kg/m²     I & O - 24hr:   Intake/Output Summary (Last 24 hours) at 7/20/2022 1110  Last data filed at 7/20/2022 0954  Gross per 24 hour   Intake 1110 ml   Output 4245 ml   Net -3135 ml      General Appearance: alert, appears stated age, and cooperative  HEENT:  Head: Normocephalic, no lesions, without obvious abnormality. Neck: no JVD  Lung: clear to auscultation bilaterally  Heart: regular rate and rhythm, S1, S2 normal, no murmur, click, rub or gallop  Abdomen: soft, non-tender; bowel sounds normal; no masses,  no organomegaly. Abdomen less swollen but still large. No more tenderness to palpation in the epigastric region. Extremities:  edema +2 left +1 right  Musculokeletal: No joint swelling, no muscle tenderness. ROM normal in all joints of extremities.    Neurologic: Mental status: Alert, oriented, thought content appropriate  Subject  Pertinent Information & Imaging Studies, Consults   genesis  CBC:   Lab Results   Component Value Date/Time    WBC 6.1 07/20/2022 04:54 AM    RBC 3.16 07/20/2022 04:54 AM    HGB 8.7 07/20/2022 04:54 AM    HCT 29.5 07/20/2022 04:54 AM    MCV 93.4 07/20/2022 04:54 AM    MCH 27.5 07/20/2022 04:54 AM    MCHC 29.5 07/20/2022 04:54 AM    RDW 17.2 07/20/2022 04:54 AM     07/20/2022 04:54 AM    MPV 10.9 07/20/2022 04:54 AM     CBC with Differential:    Lab Results   Component Value Date/Time    WBC 6.1 07/20/2022 04:54 AM    RBC 3.16 07/20/2022 04:54 AM    HGB 8.7 07/20/2022 04:54 AM    HCT 29.5 07/20/2022 04:54 AM     07/20/2022 04:54 AM    MCV 93.4 07/20/2022 04:54 AM    MCH 27.5 07/20/2022 04:54 AM    MCHC 29.5 07/20/2022 04:54 AM    RDW 17.2 07/20/2022 04:54 AM    NRBC 0.0 03/15/2019 09:10 AM    SEGSPCT 74 08/20/2013 10:45 AM    METASPCT 0.9 07/14/2022 05:31 AM    LYMPHOPCT 10.9 07/20/2022 04:54 AM    MONOPCT 5.6 07/20/2022 04:54 AM    MYELOPCT 2.6 07/14/2022 05:31 AM    EOSPCT 1 06/16/2017 12:00 AM    BASOPCT 0.3 07/20/2022 04:54 AM    MONOSABS 0.34 07/20/2022 04:54 AM    LYMPHSABS 0.66 07/20/2022 04:54 AM    EOSABS 0.13 07/20/2022 04:54 AM    BASOSABS 0.02 07/20/2022 04:54 AM     Sodium:    Lab Results   Component Value Date/Time     07/20/2022 04:54 AM     Potassium:    Lab Results   Component Value Date/Time    K 3.6 07/20/2022 04:54 AM    K 5.0 07/06/2022 02:41 AM     Hepatic Function Panel:    Lab Results   Component Value Date/Time    ALKPHOS 79 07/20/2022 04:54 AM    ALT <5 07/20/2022 04:54 AM    AST 7 07/20/2022 04:54 AM    PROT 5.8 07/20/2022 04:54 AM    BILITOT 0.9 07/20/2022 04:54 AM    BILIDIR 0.2 07/20/2013 06:20 PM    LABALBU 3.8 07/20/2022 04:54 AM     Albumin:    Lab Results   Component Value Date/Time    LABALBU 3.8 07/20/2022 04:54 AM     Calcium:    Lab Results   Component Value Date/Time    CALCIUM 8.6 07/20/2022 04:54 AM       IMAGING:   Imaging Studies:    XR CHEST PORTABLE    Result Date: 7/15/2022  No sizable pleural effusion. XR CHEST PORTABLE    Result Date: 7/14/2022  Mild subsegmental atelectasis in the left lower lobe.             PROCEDURES: none performed today      Notable Cultures:      Blood cultures Blood Culture, Routine   Date Value Ref Range Status   07/05/2022 5 Days no growth  Final     Respiratory cultures No results found for: RESPCULTURE   Gram Stain Result   Date Value Ref Range Status   07/08/2022 Refer to ordered Gram stain for results  Final     Urine   Creatinine Ur POCT   Date Value Ref Range Status   06/17/2019 50 mg/dl  Final     Urine Culture, Routine   Date Value Ref Range Status   07/05/2022 Growth not present  Final     Legionella No results found for: LABLEGI  C Diff PCR No results found for: CDIFPCR  Wound culture/abscess: No results for input(s): WNDABS in the last 72 hours. Tip culture:No results for input(s): CXCATHTIP in the last 72 hours. Antibiotic  Days  Day started                                  OXYGENATION: 3 L nasal canula     DIET: SPL review        Resident's Assessment and Plan     Chase Caller is a 66 y.o. female with  has a past medical history of A-fib ([address] Acute on chronic congestive heart failure ([address] Anxiety, Asthma, CAD (coronary artery disease), Cancer ([address] Chronic kidney disease, Depression, Diabetes mellitus ([address] H/O mammogram, Hx MRSA infection, Hyperlipidemia, Hypertension, Lateral epicondylitis, SERENA on CPAP, Parkinson's disease ([address] and Tubal ligation status. came here with CC   Chief Complaint   Patient presents with    Altered Mental Status     per ems family reports ams x 45 minutes, recent psych med changes and right toe amputation        SUMMARY OF HOSPITAL STAY: Briefly, pt is a 66year old woman admitted for acute hypoxic hypercapnic resp failure and AMS likely 2/2 pleural effusion, possible HAP and acute decompensated HFpEF exacerbation (EF 70-75%), and septic shock likely 2/2 pneumonia, hypernatremia and lactic acidosis. She was intubated and put in the ICU and extubated after improvement on 7/10/22. Patient has completed a 10 day course of cefepime 2000 mg IV q12hr and vancomycin for possible HAP.  Patient had an episode of black diarrhea and was FOBT positive on 7/16/22. Patient's Hb dropped to 6.7 which required a packed RBC on 7/17/22 which brought the Hb up to 8.3 GS performed EGS (7/19) showing moderate diffuse gastritis and duodenitis. Days since admission: 15    Consults:   Critical Care  Pulmonology  Palliative care  Cardiology  Nephrology  GS    Assessment:    Neurology  Acute encephalopathy - resolved  Intubated in ICU d/t AMS  Extubated on 7/10/22  Plan:  Hold home depakote d/t it's association with encephalopathy  Hx of Parkinson  Stable  Plan:  Continue home carbidopa-levodopa  Hx of restless leg syndrome  Patient complaining of worsening during night time  Plan:  Continue home ropinirole 1mg TID  Hx of depression and anxiety  Plan:  Continue home quetiapine     Cardiovascular  Hx of permanent A-fib  DCCV in April and May 2022, currently rate controlled  Plan:  Continue with metoprolol and amiodarone  Continue Eliquis  Hold digoxin d/t previus toxicity  Hx of HTN  Plan:  Continue metoprolol  Hx of CAD  Plan:  Continue ASA  Hx of HLD  Plan:  Continue atorvastatin 20mg  Edema 2/2 acute decompensated diastolic heart failure  Hx of heart failure w/ preserved EF (70-75%, mild tricuspid regurg) last echo done 7/7/22  Plan:  Continue metoprolol  Nephro recommends continue lasix, consider IV diuril as warranted, follow labs, low Na diet, fluid restriction, follow I/O's     Pulmonology  Moderate RIGHT pleural effusion likely 2/2 acute decompensated HF vs ? HAP  Most recent CR on 7/15/22 shoed no sizable pleural effusion compared to the CXR on 7/13/22  Finished 10 day course of cefepime and vanc  Transudative pleural effusion   Plan:  Monitor resp status, wean O2 as able. Pulmonology following. Continue to chest tube (placed 7/8) decreased output from yesterday  Resend pleural fluid studies, possibly won't require chest tube if effusion is from HF  Continue lasix gtt as recommended by nephrology. Monitor renal function.    Noted plan to start acetazolamide per nephrology   Follow labs, and urine output   Hx of asthma  Plan:  Continue ipratropium-albuterol 1 ampule, inhalation Q4H WA  Hx of SERENA  Patient not using CPAP at home     Gastrointestinal  Oropharyngeal dysphasia  SLP eval -> minced and moist diet  Plan:  SLP reevaluation, may need to change diet     Renal  Metabolic alkalosis likely 2/2 diuresis vs SERENA   Normal bicarb on admission   Plan:  Start acetazolamide if bicarb increases      Endocrinology  Hx of type 2 DM w/ neuropathyOn lantus 10 and Novolog SS at home  Plan:  Insulin glargine 5 units SQ nightly and insulin lispro 0-6 units SQ 4X daily AC and HS     Infectious disease  Septic shock likely 2/2 CAP - resolved  Lactic acidosis - resolved  Patient finished her 10 day course of vanc + cefepime     Heme-Onc  Acute on chronic anemia   EGD (7/19/22) showed moderate diffuse gastritis and duodenitis with no bleeding  Previous EGD in 2015 showed mild gastritis  FOBT (+) on 7/16/22  Melena 7/15/22  Required 2nd transfusion of 1pRBC on 7/17 Hb 6.7 > 8.3 post and a previous transfusion on of 1 pRBC on 7/8  Plan:  Check serology for H. Pylori and treat if necessary.    Switch to oral PPI, continue carafate   Monitor for drop of Hb    Problems resolved during this admission:   Acute encephalopathy multifactorial 2/2 septic shock, digoxin toxicity, uremia  Acute hypoxic hypercapnic respiratory failure likely 2/2 to RIGHT pleural effusion, possible HAP vs acute decompensated HFpEF exacerbation (EF 70-75)  Shock likely septic, HAP, digoxin toxicity  HAGMA 2/2 lactic acidiosis (hypoperfusion, septic shock), uremia 2/2 SHANTANU  Elevated troponin  SHANTANU stage III on CKD  Metabolic alkalosis likely contraction alkalosis       PT/OT: continue  DVT ppx: Eliquis  GI ppx: protonix 40mg PO BID and carafate      Next of Kin/ POA:  Daughter    Code Status:   FULL code    Disposition: [facility], may need oxygen on discharge      Moriah Amin MD, PGY-1  Attending physician: Dr. Ninfa Solis    Senior Resident Addendum:  I have seen and examined the patient with the intern. I have discussed the case, including pertinent history and exam findings with the intern.  I agree with the assessment, plan and orders as documented above with the following additions:     Days since admission: 14     Consults:   Critical care  Cardiology  [address] [place] [address] care  Nephrology  Pulmonology     Assessment & Plan:  Normocytic anemia 2/2 Gastritis   CBC daily  Transfuse for Hb<7  Stable   GIB 2/2 Gastritis, resolved  S/p 2u pRBC since admission  EGD 7/19/22 showed gastritis and duodenitis  Oropharyngeal dysphagia  SLP eval: minced and moist diet  SLP to re-eval today to see if diet can be advanced  Pleural effusion, transudative, 2/2 HF exacerbation s/p chest tube insertion on 7/8/22  245cc drainage past 24h  Pulmonology following, output needs to be <100cc to remove tube  Water seal, no air leak  Hx of permanent afib  Continue eliquis 5mg BID  Keep digoxin held as had encephalopathy with digoxin toxicity this admission  Continue amiodarone  Hx of HTN  Hx of Type 2 DM  Continue lantus 5u qhs, LDSS  Hx of depression/anxiety  On home seroquel  Hx of restless leg syndrome  Continue home ropinirole  Hx of Parkinson's disease  Continue home sinemet     Problems resolved during this admission:  Acute encephalopathy, multifactorial 2/2 shock, digoxin toxicity, uremia  Shock 2/2 sepsis  Acute hypoxic hypercapnic respiratory failure 2/2 pleural effusion and acute HFpEF decompensation  Acute pancreatitis  SHANTANU Stage III on CKD  Hyperkalemia  HAGMA 2/2 lactic acidosis, uremia  Elevated INR        PT/OT: [facility] 15/24  DVT ppx: eliquis 5mg BID  GI ppx: protonix 40mg BID     Disposition: continue current care     Herrera Salinas MD, PGY-2  7/20/2022  1:50 PM

## 2022-07-20 NOTE — PROGRESS NOTES
Pt unavailable for evaluation today however a MBS is recommended to fully evaluate swallowing function. Aphysician order is needed.

## 2022-07-20 NOTE — PROGRESS NOTES
Physical Therapy  Physical Therapy Treatment    Name: Anastacio Ley  : 1950  MRN: [id number]      Date of Service: 2022    Evaluating PT:  Dipika Rodriguez PT, DPT [id number]    Room #:  2530/3640-M  Diagnosis:  Shock ([facility] [R57.9]  Uremia [N19]  SHANTANU (acute kidney injury) ([facility] [N17.9]  Poisoning by digoxin, accidental or unintentional, initial encounter [T46.0X1A]  Altered mental status, unspecified altered mental status type [R41.82]  Acute pancreatitis, unspecified complication status, unspecified pancreatitis type [K85.90]  PMHx/PSHx:  CHF, a fib, anxiety, asthma, CAD, CKD, DM, HLD, HTN, SERENA, parkinson's disease, tubal ligation status  Procedure/Surgery:  Extubated 7/10  Precautions:  Falls, , O2, R chest tube  Equipment Needs:  TBD    SUBJECTIVE:    Pt lives alone in an apartment with level entry and elevator access. Bed is on 1st floor and bath is on 1st floor. Pt ambulated with rollator PTA. Pt reports having conversation with son about transitioning to assisted living, but has not yet been able to. OBJECTIVE:   Initial Evaluation  Date: 22 Treatment  Date:22 Short Term/ Long Term   Goals   AM-PAC 6 Clicks     Was pt agreeable to Eval/treatment? yes yes    Does pt have pain?  L hip pain during mobility No c/o pain    Bed Mobility  Rolling: MaxA x2  Supine to sit: MaxA x2  Sit to supine: maxA x2  Scooting: MaxA x2 Rolling: NT  Supine to sit: SBA  Sit to supine: SBA  Scooting: SBA Rolling: Shell  Supine to sit: Shell  Sit to supine: Shell  Scooting: Shell   Transfers Sit to stand: Shell x2  Stand to sit: Shell x2  Stand pivot: NT Sit to stand: Shell  Stand to sit: Shell  Stand pivot: Shell with [employer] Sit to stand: SBA  Stand to sit: SBA  Stand pivot: SBA with [employer]   Ambulation    NT, unable NT, pt refused 50 feet with [employer] SBA   Stair negotiation: ascended and descended  NT NT TBD   ROM BUE:  Defer to OT note  BLE:  WFL     Strength BUE:  Defer to OT note  BLE:  B hip flex 3/5, B knee ext 3+/5, B dorsiflex 3+/5  WFL   Balance Sitting EOB:  SBA<>Shell  Dynamic Standing:  Shell x2 with [employer] Sitting EOB:  SBA  Dynamic Standing:  Shell with [employer] Sitting EOB:  Independent   Dynamic Standing:  SBA with [employer]   Pt is A & O x 4  Sensation:  NT  Edema:  none noted    Patient education  Pt educated on role of PT, importance of continued cooperation with skilled therapy for improved functional mobility    Patient response to education:   Pt verbalized understanding Pt demonstrated skill Pt requires further education in this area   yes yes yes     ASSESSMENT:    Comments:  Patient semi-supine in bed and minimally agreeable to participate in PT treatment. Pt required extensive education for participation this date with argumentative behavior toward staff. Pt sat EOB for approximately 10 minutes. Pt initially refused stand pivot to chair for lunch. After further education, pt agreeable and able to complete task with light balance assist with [employer]. Pt left in chair at end of session with all needs met. Treatment:  Patient practiced and was instructed in the following treatment:    Bed Mobility: VCs provided for sequencing and safety during mobility. Manual assist provided for completion of task. Transfer Training: Verbal and tactile cueing provided for sequencing and safety during mobility. Manual assist provided for completion of task  Therapeutic Activities: pt sat EOB for approximately 10 minutes during session with no assist for static and dynamic sitting balance. Patient Education: Education provided on importance of OOB activity for improved outcomes    PLAN:    Patient is making fair progress towards established goals. Will continue with current POC.       Time in  1106  Time out  1130    Total Treatment Time  24 minutes     CPT codes:  [] Gait training 53368 - minutes  [] Manual therapy [id number] - minutes  [x] Therapeutic activities 38138 24 minutes  [] Therapeutic exercises 44218 - minutes  [] Neuromuscular reeducation 40320 - minutes    Shannan Vicente PT, DPT  [id number]

## 2022-07-20 NOTE — PROGRESS NOTES
[address] [address] [address]      Date:2022                                                  Patient Name: Verito Rodriges  MRN: [id number]  : 1950  Room: [address] [place], [place] [address]    Evaluating OT: EMERY Cho, OTR/L  # [id number]    Referring Provider:  Tobie Castleman, MD, Guerrero Elliott MD  Specific Provider Orders:  Yessyar Cindy and Treat\"  22    Diagnosis: Shock ([address] [R57.9]  Uremia [N19]  SHANTANU (acute kidney injury) ([facility] [address] [N17.9]  Poisoning by digoxin, accidental or unintentional, initial encounter [T46.0X1A]  Altered mental status, unspecified altered mental status type [R41.82]  Acute pancreatitis, unspecified complication status, unspecified pancreatitis type [K85.90]    Pt was admitted w/ Altered Mental Status, Bradycardia, Hypotension. Recent Med Changes for Psych issues/R Toe Amputation (22). Pertinent Medical History:  Pt has a past medical history of A-fib ([address] Acute on chronic congestive heart failure ([address] Anxiety, Asthma, CAD (coronary artery disease), Cancer ([address] Chronic kidney disease, Depression, Diabetes mellitus ([address] H/O mammogram, Hx MRSA infection, Hyperlipidemia, Hypertension, Lateral epicondylitis, SERENA on CPAP, Parkinson's disease ([address] and Tubal ligation status. ,  has a past surgical history that includes Gallbladder surgery; Toe amputation; Cholecystectomy; Colonoscopy (7/29/15); Endoscopy, colon, diagnostic (7/19/15); Upper gastrointestinal endoscopy; lumbar laminectomy; Tonsillectomy; Breast lumpectomy; Breast reduction surgery; Cardiac catheterization (2014); Cardiac catheterization (2022); CT GUIDED CHEST TUBE (2022); and Upper gastrointestinal endoscopy (N/A, 2022).     Surgeries this admission: None   Intubated 22, Right Pigtail Chest Tube Placed posteriorly Right Lung 22, Extubated 22    Precautions: Fall Risk  Pure-Wick Catheter  Pigtail Chest Tube - posteriorly R Lung  Minced/Moist Diet/No Straw      Assessment of current deficits   [x] Functional mobility  [x]ADLs  [x] Strength               []Cognition   [x] Functional transfers   [x] IADLs         [x] Safety Awareness   [x]Endurance   [] Fine Coordination              [x] Balance      [] Vision/perception   []Sensation    []Gross Motor Coordination  [] ROM  [] Delirium                   [] Motor Control       OT PLAN OF CARE   OT POC based on physician orders, patient diagnosis and results of clinical assessment    Frequency/Duration 1-3 days/wk for 2 weeks PRN   Specific OT Treatment to include:     * Instruction/training on adapted ADL techniques and AE recommendations to increase functional independence within precautions       * Training on energy conservation strategies, correct breathing pattern and techniques to improve independence/tolerance for self-care routine  * Functional transfer/mobility training/DME recommendations for increased independence, safety, and fall prevention  * Patient/Family education to increase follow through with safety techniques and functional independence  * Recommendation of environmental modifications for increased safety with functional transfers/mobility and ADLs  * Cognitive retraining/development of therapeutic activities to improve problem solving, judgement, memory, and attention for increased safety/participation in ADL/IADL tasks  * Therapeutic exercise to improve motor endurance, ROM, and functional strength for ADLs/functional transfers  * Therapeutic activities to facilitate/challenge dynamic balance, stand tolerance for increased safety and independence with ADLs  * Therapeutic activities to facilitate gross/fine motor skills for increased independence with ADLs  * Neuro-muscular re-education: facilitation of righting/equilibrium reactions  * Positioning to improve skin integrity, interaction with environment and functional independence  * Delirium prevention/treatment  * Manual techniques for edema management  Other:    Recommended Adaptive Equipment: TBD as pt progresses       Home Living:  Pt lives alone in a single-level apartment, Level Entry, No Basement. Bathroom setup:  Walk-in-Shower, Standard-height Commode   Equipment owned:  Rollator, Shower chair    Available Family Assist:  Family members live locally - provide assist PRN    Prior Level of Function:  Pt reported being IND with all ADLs, Light IADLs, Transfers and Mobility using Rollator for ambulation.    Driving:  No  Occupation:  None reported    Pain Level:  Denied pain    Additional Complaints:  \"I'm exhausted\", Mild Discomfort w/ Chest Tube, No Dizziness     Cognition: A & O x 4 - able to ID current Day/date INDly   Able to Follow Multi-Step Commands w/ occasional Min VCs for safety   Memory:  good    Sequencing:  good (-)   Problem solving:  good (-)   Judgement/safety:  good (-)        Functional Assessment:  AM-PAC Daily Activity Raw Score: 14/24     Initial Eval Status  Date: 7/13/22   Treatment Status  Date:  7-20-22 STGs = LTGs  Time frame: 10-14 days   Feeding SUP/Set up     Set up NA   Grooming Min A/Set up    Min A for hair care  Initially required Min A for safety w/ dynamic sitting balance - improved to Close SUP EOB   Unable to tolerate ambulating to or standing at sink   Pt refused throwing wash cloth on ground when asked to complete SUP/Set up  Seated/Standing at the Sink   UB Dressing Mod A/Set up    Simulated - seated EOB  Unable to tolerate item retrieval for activities   Min A    Donning/doffing gown seated EOB    Set up     LB Dressing Dep    Max A to don socks  Mod A of 2 for safety in standing - pants simulated  Pt ed for safe/adaptive techs, use of adaptive equip   Max A    Max A to don socks seated EOB     Min A     Bathing NT     Max A  simulated Min A      Toileting Dep    Pure-Wick  Fecal Mgmt System   Max A    Per last tx Min A     Bed Mobility  Supine to sit: Max A of 2   Sit to supine:  Max A of 2     VCs for safety   Supine to sit: SBA   Sit to supine:  N/T     Educated pt on technique to increase independence. Supine to sit: SUP  Sit to supine: SUP     Functional Transfers Mod A of 2 for safety    Standing from EOB, mgmt of multiple lines  Pt ed for safety/hand placement   Min A- sit<->stand  Pt ed for safety/hand placement SUP     Functional Mobility Mod A of 2 for safety w/ [employer]    Side-stepping very short distance along EOB  Pt ed for safety/improved safety awareness, walker safety   Min A w/ [employer]  Stand pivot transfer using w/w   SUP     Balance Sitting:     Static:  Min A -> SUP    Dynamic:  Min A -> Close SUP w/ functional ax EOB     Standing:     Static:  Mod A of 2 w/ [employer]    Dynamic:  Mod A of 2 w/ functional ax/mobility w/ [employer]   Sitting:     Static:  Ind    Dynamic:  SUP    Standing:     Static:  Min A w/ [employer]    Dynamic:  Min A w/ functional ax/mobility w/ [employer] Sitting:     Static:  IND    Dynamic:  IND w/ functional ax    Standing:     Static:  SUP w/ AD PRN    Dynamic:  SUP w/ functional ax/mobility w/ AD PRN   Activity Tolerance Fair(-)    Able to tolerate sitting EOB > 20 mins  Able to tolerate standing ~ 3-4 mins   Poor  Required encouragement to participate in all functional activities   Good(-)   Visual/  Perceptual    Hearing: WNL   Glasses: No    WFL   Hearing Aids:  No               Comments: Upon arrival, patient was found sleeping in semi-supine. Pt educated on techniques to increase independence and safety during ADL's, bed mobility, and functional transfers. At the end of the session, patient seated in bedside chair. Call light and phone within reach, all lines and tubes intact. Overall patient demonstrated decreased independence and safety during completion of ADL/functional transfer/mobility tasks.   Pt would benefit from continued skilled OT to increase safety and independence with completion of ADL/IADL tasks for functional independence and quality of life. Pt and/or Family verbalized/demonstrated a Good understanding of education provided. Will Review PRN.       Time In: 11:05  Time Out: 11:30  Total Treatment Time: 25 minutes    Min Units   OT Eval Low 06832       OT Eval Medium 26861      OT Eval High 72515      OT Re-Eval [id number]       Therapeutic Ex 42849       Therapeutic Activities 53560  29  5   ADL/Self Care 60277  10  1   Orthotic Management 04783       Manual [id number]     Neuro Re-Ed 02952       Non-Billable Time            Jaquan Maza

## 2022-07-20 NOTE — CARE COORDINATION
Adonica Child auth good through today. CT to gravity continues and lasix gtt. Requested updated therapy notes today. Pasrr and ambulette on soft chart. For questions I can be reached at [phone].  Soniya Russo, [facility]

## 2022-07-21 ENCOUNTER — APPOINTMENT (OUTPATIENT)
Dept: GENERAL RADIOLOGY | Age: 72
DRG: 870 | End: 2022-07-21
Payer: MEDICARE

## 2022-07-21 LAB
ALBUMIN SERPL-MCNC: 3.7 G/DL (ref 3.5–5.2)
ALP BLD-CCNC: 84 U/L (ref 35–104)
ALT SERPL-CCNC: <5 U/L (ref 0–32)
ANION GAP SERPL CALCULATED.3IONS-SCNC: 9 MMOL/L (ref 7–16)
APPEARANCE FLUID: CLEAR
AST SERPL-CCNC: 6 U/L (ref 0–31)
BASOPHILS ABSOLUTE: 0.04 E9/L (ref 0–0.2)
BASOPHILS RELATIVE PERCENT: 0.8 % (ref 0–2)
BILIRUB SERPL-MCNC: 0.7 MG/DL (ref 0–1.2)
BUN BLDV-MCNC: 22 MG/DL (ref 6–23)
CALCIUM SERPL-MCNC: 8.7 MG/DL (ref 8.6–10.2)
CELL COUNT FLUID TYPE: NORMAL
CHLORIDE BLD-SCNC: 90 MMOL/L (ref 98–107)
CO2: 40 MMOL/L (ref 22–29)
COLOR FLUID: NORMAL
CREAT SERPL-MCNC: 1.3 MG/DL (ref 0.5–1)
EOSINOPHILS ABSOLUTE: 0.06 E9/L (ref 0.05–0.5)
EOSINOPHILS RELATIVE PERCENT: 1.2 % (ref 0–6)
GFR AFRICAN AMERICAN: 49
GFR NON-AFRICAN AMERICAN: 40 ML/MIN/1.73
GLUCOSE BLD-MCNC: 170 MG/DL (ref 74–99)
GRAM STAIN ORDERABLE: NORMAL
HCT VFR BLD CALC: 28.3 % (ref 34–48)
HEMOGLOBIN: 8.2 G/DL (ref 11.5–15.5)
IMMATURE GRANULOCYTES #: 0.02 E9/L
IMMATURE GRANULOCYTES %: 0.4 % (ref 0–5)
INR BLD: 1.9
LYMPHOCYTES ABSOLUTE: 0.66 E9/L (ref 1.5–4)
LYMPHOCYTES RELATIVE PERCENT: 13.3 % (ref 20–42)
MAGNESIUM: 1.6 MG/DL (ref 1.6–2.6)
MCH RBC QN AUTO: 27.4 PG (ref 26–35)
MCHC RBC AUTO-ENTMCNC: 29 % (ref 32–34.5)
MCV RBC AUTO: 94.6 FL (ref 80–99.9)
METER GLUCOSE: 123 MG/DL (ref 74–99)
METER GLUCOSE: 147 MG/DL (ref 74–99)
METER GLUCOSE: 179 MG/DL (ref 74–99)
METER GLUCOSE: 225 MG/DL (ref 74–99)
MONOCYTE, FLUID: 91 %
MONOCYTES ABSOLUTE: 0.3 E9/L (ref 0.1–0.95)
MONOCYTES RELATIVE PERCENT: 6 % (ref 2–12)
NEUTROPHIL, FLUID: 9 %
NEUTROPHILS ABSOLUTE: 3.89 E9/L (ref 1.8–7.3)
NEUTROPHILS RELATIVE PERCENT: 78.3 % (ref 43–80)
NUCLEATED CELLS FLUID: 98 /UL
PDW BLD-RTO: 17.5 FL (ref 11.5–15)
PHOSPHORUS: 3.6 MG/DL (ref 2.5–4.5)
PLATELET # BLD: 221 E9/L (ref 130–450)
PMV BLD AUTO: 11.1 FL (ref 7–12)
POTASSIUM SERPL-SCNC: 3.5 MMOL/L (ref 3.5–5)
PROTHROMBIN TIME: 20.9 SEC (ref 9.3–12.4)
RBC # BLD: 2.99 E12/L (ref 3.5–5.5)
RBC FLUID: <2000 /UL
SODIUM BLD-SCNC: 139 MMOL/L (ref 132–146)
TOTAL PROTEIN: 5.8 G/DL (ref 6.4–8.3)
WBC # BLD: 5 E9/L (ref 4.5–11.5)

## 2022-07-21 PROCEDURE — 74230 X-RAY XM SWLNG FUNCJ C+: CPT

## 2022-07-21 PROCEDURE — 99232 SBSQ HOSP IP/OBS MODERATE 35: CPT | Performed by: INTERNAL MEDICINE

## 2022-07-21 PROCEDURE — 92611 MOTION FLUOROSCOPY/SWALLOW: CPT

## 2022-07-21 PROCEDURE — 6360000002 HC RX W HCPCS: Performed by: SURGERY

## 2022-07-21 PROCEDURE — 84100 ASSAY OF PHOSPHORUS: CPT

## 2022-07-21 PROCEDURE — 85610 PROTHROMBIN TIME: CPT

## 2022-07-21 PROCEDURE — 6370000000 HC RX 637 (ALT 250 FOR IP): Performed by: SURGERY

## 2022-07-21 PROCEDURE — 83735 ASSAY OF MAGNESIUM: CPT

## 2022-07-21 PROCEDURE — S5553 INSULIN LONG ACTING 5 U: HCPCS | Performed by: SURGERY

## 2022-07-21 PROCEDURE — 92526 ORAL FUNCTION THERAPY: CPT

## 2022-07-21 PROCEDURE — 2580000003 HC RX 258: Performed by: INTERNAL MEDICINE

## 2022-07-21 PROCEDURE — 85025 COMPLETE CBC W/AUTO DIFF WBC: CPT

## 2022-07-21 PROCEDURE — 6360000002 HC RX W HCPCS: Performed by: INTERNAL MEDICINE

## 2022-07-21 PROCEDURE — 2700000000 HC OXYGEN THERAPY PER DAY

## 2022-07-21 PROCEDURE — 94664 DEMO&/EVAL PT USE INHALER: CPT

## 2022-07-21 PROCEDURE — 6370000000 HC RX 637 (ALT 250 FOR IP): Performed by: INTERNAL MEDICINE

## 2022-07-21 PROCEDURE — 36415 COLL VENOUS BLD VENIPUNCTURE: CPT

## 2022-07-21 PROCEDURE — 2580000003 HC RX 258: Performed by: SURGERY

## 2022-07-21 PROCEDURE — 82962 GLUCOSE BLOOD TEST: CPT

## 2022-07-21 PROCEDURE — 80053 COMPREHEN METABOLIC PANEL: CPT

## 2022-07-21 PROCEDURE — 94640 AIRWAY INHALATION TREATMENT: CPT

## 2022-07-21 PROCEDURE — 2060000000 HC ICU INTERMEDIATE R&B

## 2022-07-21 RX ORDER — ACETAZOLAMIDE 500 MG/5ML
500 INJECTION, POWDER, LYOPHILIZED, FOR SOLUTION INTRAVENOUS EVERY 12 HOURS
Status: COMPLETED | OUTPATIENT
Start: 2022-07-21 | End: 2022-07-21

## 2022-07-21 RX ADMIN — POLYETHYLENE GLYCOL 3350 17 G: 17 POWDER, FOR SOLUTION ORAL at 20:43

## 2022-07-21 RX ADMIN — ATORVASTATIN CALCIUM 20 MG: 20 TABLET, FILM COATED ORAL at 10:03

## 2022-07-21 RX ADMIN — IPRATROPIUM BROMIDE AND ALBUTEROL SULFATE 1 AMPULE: .5; 2.5 SOLUTION RESPIRATORY (INHALATION) at 11:35

## 2022-07-21 RX ADMIN — SUCRALFATE 1 G: 1 TABLET ORAL at 06:11

## 2022-07-21 RX ADMIN — CARBIDOPA AND LEVODOPA 2 TABLET: 25; 100 TABLET, EXTENDED RELEASE ORAL at 14:20

## 2022-07-21 RX ADMIN — FUROSEMIDE 20 MG/HR: 10 INJECTION INTRAMUSCULAR; INTRAVENOUS at 20:49

## 2022-07-21 RX ADMIN — QUETIAPINE FUMARATE 25 MG: 25 TABLET ORAL at 10:03

## 2022-07-21 RX ADMIN — BUDESONIDE 500 MCG: 0.5 SUSPENSION RESPIRATORY (INHALATION) at 20:36

## 2022-07-21 RX ADMIN — INSULIN LISPRO 1 UNITS: 100 INJECTION, SOLUTION INTRAVENOUS; SUBCUTANEOUS at 17:20

## 2022-07-21 RX ADMIN — SODIUM CHLORIDE, PRESERVATIVE FREE 5 ML: 5 INJECTION INTRAVENOUS at 21:01

## 2022-07-21 RX ADMIN — INSULIN LISPRO 1 UNITS: 100 INJECTION, SOLUTION INTRAVENOUS; SUBCUTANEOUS at 06:11

## 2022-07-21 RX ADMIN — METOPROLOL TARTRATE 50 MG: 50 TABLET, FILM COATED ORAL at 20:43

## 2022-07-21 RX ADMIN — QUETIAPINE FUMARATE 25 MG: 25 TABLET ORAL at 20:42

## 2022-07-21 RX ADMIN — IPRATROPIUM BROMIDE AND ALBUTEROL SULFATE 1 AMPULE: .5; 2.5 SOLUTION RESPIRATORY (INHALATION) at 16:02

## 2022-07-21 RX ADMIN — BUDESONIDE 500 MCG: 0.5 SUSPENSION RESPIRATORY (INHALATION) at 07:57

## 2022-07-21 RX ADMIN — METOPROLOL TARTRATE 50 MG: 50 TABLET, FILM COATED ORAL at 10:03

## 2022-07-21 RX ADMIN — INSULIN LISPRO 2 UNITS: 100 INJECTION, SOLUTION INTRAVENOUS; SUBCUTANEOUS at 20:59

## 2022-07-21 RX ADMIN — APIXABAN 5 MG: 5 TABLET, FILM COATED ORAL at 20:42

## 2022-07-21 RX ADMIN — PANTOPRAZOLE SODIUM 40 MG: 40 TABLET, DELAYED RELEASE ORAL at 17:20

## 2022-07-21 RX ADMIN — SUCRALFATE 1 G: 1 TABLET ORAL at 14:19

## 2022-07-21 RX ADMIN — ACETAZOLAMIDE 500 MG: 500 INJECTION, POWDER, LYOPHILIZED, FOR SOLUTION INTRAVENOUS at 11:23

## 2022-07-21 RX ADMIN — AMIODARONE HYDROCHLORIDE 200 MG: 200 TABLET ORAL at 10:03

## 2022-07-21 RX ADMIN — INSULIN GLARGINE-YFGN 5 UNITS: 100 INJECTION, SOLUTION SUBCUTANEOUS at 20:59

## 2022-07-21 RX ADMIN — CARBIDOPA AND LEVODOPA 2 TABLET: 25; 100 TABLET, EXTENDED RELEASE ORAL at 20:45

## 2022-07-21 RX ADMIN — ACETAZOLAMIDE 500 MG: 500 INJECTION, POWDER, LYOPHILIZED, FOR SOLUTION INTRAVENOUS at 20:43

## 2022-07-21 RX ADMIN — FUROSEMIDE 20 MG/HR: 10 INJECTION INTRAMUSCULAR; INTRAVENOUS at 01:39

## 2022-07-21 RX ADMIN — FUROSEMIDE 20 MG/HR: 10 INJECTION INTRAMUSCULAR; INTRAVENOUS at 06:16

## 2022-07-21 RX ADMIN — ROPINIROLE HYDROCHLORIDE 1 MG: 1 TABLET, FILM COATED ORAL at 14:20

## 2022-07-21 RX ADMIN — CARBIDOPA AND LEVODOPA 2 TABLET: 25; 100 TABLET, EXTENDED RELEASE ORAL at 10:03

## 2022-07-21 RX ADMIN — IPRATROPIUM BROMIDE AND ALBUTEROL SULFATE 1 AMPULE: .5; 2.5 SOLUTION RESPIRATORY (INHALATION) at 20:36

## 2022-07-21 RX ADMIN — ASPIRIN 81 MG CHEWABLE TABLET 81 MG: 81 TABLET CHEWABLE at 10:03

## 2022-07-21 RX ADMIN — ROPINIROLE HYDROCHLORIDE 1 MG: 1 TABLET, FILM COATED ORAL at 20:43

## 2022-07-21 RX ADMIN — ROPINIROLE HYDROCHLORIDE 1 MG: 1 TABLET, FILM COATED ORAL at 10:03

## 2022-07-21 RX ADMIN — IPRATROPIUM BROMIDE AND ALBUTEROL SULFATE 1 AMPULE: .5; 2.5 SOLUTION RESPIRATORY (INHALATION) at 07:57

## 2022-07-21 RX ADMIN — SUCRALFATE 1 G: 1 TABLET ORAL at 17:20

## 2022-07-21 RX ADMIN — ANORECTAL OINTMENT: 15.7; .44; 24; 20.6 OINTMENT TOPICAL at 10:04

## 2022-07-21 RX ADMIN — APIXABAN 5 MG: 5 TABLET, FILM COATED ORAL at 10:03

## 2022-07-21 NOTE — CARE COORDINATION
Auth for [employer] still good through today. Updated team 2 per their request. Will arrange if stable for discharge. Pasrr and ambulette on soft chart. Updated patient yesterday on bed availability at [facility]. For questions I can be reached at [phone].  Macarena Taylor, [facility]

## 2022-07-21 NOTE — PROGRESS NOTES
[place]  Department of Internal Medicine  Division of Pulmonary, Critical Care and Sleep Medicine  Progress Note    Arnoldo Calderon DO, MPH, [facility], FACOI, [address] [facility] Cora CRABTREE MD      Patient: Beata Mckenzie  MRN: [id number]  : 1950    Encounter Time: 4:13 PM     Date of Admission: 2022 12:25 PM    Primary Care Physician: No primary care provider on file. Reason for Consultation: Drain CT management     SUBJECTIVE:    [id number] this drain make no sense if she has only HFpEF  Lets resent the fluid for complete analysis   Bumex gtt   CT output noted - improved    OBJECTIVE:     PHYSICAL EXAM:   VITALS:   Vitals:    22 0800 22 1100 22 1545 22 1602   BP: (!) 107/53 103/64 (!) 91/48    Pulse: 87 65 75    Resp: 18      Temp: 97.7 °F (36.5 °C)      TempSrc: Oral      SpO2:    97%   Weight: 209 lb 4 oz (94.9 kg)      Height:            Intake/Output Summary (Last 24 hours) at 2022 1613  Last data filed at 2022 1420  Gross per 24 hour   Intake 528.17 ml   Output 3010 ml   Net -2481.83 ml          CONSTITUTIONAL:    Alert  SKIN:     No rash,   HEENT:     EOMI, MMM, No thrush  NECK:    No bruits, No JVP appreciated  CV:      Sinus,  No murmur, No rubs, No gallops  PULMONARY:   Couse BS,  No Wheezing, No Rales, No Rhonchi      CT noted  ABDOMEN:     Soft, non-tender. BS normal. No R/R/G  EXT:    No deformities . No clubbing. Edema ower extremity edema, No venous stasis  PULSE:   Appears equal and palpable.   PSYCHIATRIC:  Seems appropriate, No acute psychosis  MS:    Gross weakness  NEUROLOGIC:   The clinical assessment is non-focal     DATA: IMAGING & TESTING:     LABORATORY TESTS:    CBC:   Lab Results   Component Value Date/Time    WBC 5.0 2022 04:34 AM    RBC 2.99 2022 04:34 AM    HGB 8.2 2022 04:34 AM    HCT 28.3 2022 04:34 AM    MCV 94.6 2022 04:34 AM    MCH 27.4 07/21/2022 04:34 AM    MCHC 29.0 07/21/2022 04:34 AM    RDW 17.5 07/21/2022 04:34 AM     07/21/2022 04:34 AM    MPV 11.1 07/21/2022 04:34 AM     CMP:    Lab Results   Component Value Date/Time     07/21/2022 04:34 AM    K 3.5 07/21/2022 04:34 AM    K 5.0 07/06/2022 02:41 AM    CL 90 07/21/2022 04:34 AM    CO2 40 07/21/2022 04:34 AM    BUN 22 07/21/2022 04:34 AM    CREATININE 1.3 07/21/2022 04:34 AM    GFRAA 49 07/21/2022 04:34 AM    LABGLOM 40 07/21/2022 04:34 AM    GLUCOSE 170 07/21/2022 04:34 AM    PROT 5.8 07/21/2022 04:34 AM    LABALBU 3.7 07/21/2022 04:34 AM    CALCIUM 8.7 07/21/2022 04:34 AM    BILITOT 0.7 07/21/2022 04:34 AM    ALKPHOS 84 07/21/2022 04:34 AM    AST 6 07/21/2022 04:34 AM    ALT <5 07/21/2022 04:34 AM     Calcium:    Lab Results   Component Value Date/Time    CALCIUM 8.7 07/21/2022 04:34 AM     Ionized Calcium:  No results found for: IONCA  Magnesium:    Lab Results   Component Value Date/Time    MG 1.6 07/21/2022 04:34 AM     Phosphorus:    Lab Results   Component Value Date/Time    PHOS 3.6 07/21/2022 04:34 AM     PT/INR:    Lab Results   Component Value Date/Time    PROTIME 20.9 07/21/2022 04:34 AM    INR 1.9 07/21/2022 04:34 AM        PRO-BNP:   Lab Results   Component Value Date    PROBNP 37,248 (H) 07/12/2022    PROBNP 41,368 (H) 07/06/2022      ABGs:   Lab Results   Component Value Date/Time    PH 7.452 07/11/2022 05:07 AM    PO2 133.6 07/11/2022 05:07 AM    PCO2 40.0 07/11/2022 05:07 AM     Hemoglobin A1C: No components found for: HGBA1C    IMAGING:  Imaging tests were completed and reviewed and discussed radiology and care team involved and reveals   Echo Complete    Result Date: 7/7/2022  Findings   Left Ventricle  Micro-bubble contrast injected to enhance left ventricular visualization. Normal left ventricular size and systolic function. Ejection fraction is visually estimated at 70-75%. Indeterminate diastolic function. No regional wall motion abnormalities seen. Mild left ventricular concentric hypertrophy noted. Right Ventricle  Moderately dilated right ventricle. Right ventricle global systolic function is reduced. TAPSE 1.1 cm. Left Atrium  The left atrium is moderate-severely dilated. JUANY 48 ml/m2. The interatrial septum appears intact. Right Atrium  Mildly enlarged right atrium. Mitral Valve  Structurally normal mitral valve. No evidence of mitral valve stenosis. Physiologic mitral regurgitation. Tricuspid Valve  The tricuspid valve appears structurally normal.  Mild tricuspid regurgitation. RVSP is at least 60 mmHg. Aortic Valve  Individual aortic valve leaflets are not clearly visualized. No hemodynamically significant aortic stenosis is present. No evidence of aortic valve regurgitation. Pulmonic Valve  The pulmonic valve was not well visualized. No evidence of pulmonic valve stenosis. No evidence of any pulmonic regurgitation. Pericardial Effusion  Prominent pericardial fat pad. No definitive evidence of pericardial effusion. Aorta  Aortic root dimension within normal limits. Miscellaneous  Dilated Inferior Vena Cava. Conclusions   Summary  Technically difficult study - limited visualization. Micro-bubble contrast injected to enhance left ventricular visualization. Normal left ventricular size and systolic function. Ejection fraction is visually estimated at 70-75%. Indeterminate diastolic function. No regional wall motion abnormalities seen. Mild left ventricular concentric hypertrophy noted. Moderately dilated right ventricle with reduced function. Biatrial dilation. Mild tricuspid regurgitation. RVSP is at least 60 mmHg. Prominent pericardial fat pad. No definitive evidence of pericardial effusion. Result Date: 7/5/2022  FINDINGS: Lower Chest: See the CT report of the chest dated the same date for full details. Organs: The liver is grossly unremarkable. The spleen is unremarkable.  Gallbladder is been surgically removed. The pancreas reveals some mild fatty replacement. Both adrenal glands are within normal limits. The kidneys are unremarkable in appearance. GI/Bowel: Stomach is within normal limits. No mucosal abnormality. Stool seen scattered diffusely throughout the colon. There is no wall thickening. No mucosal abnormality or distension of the small bowel. Pelvis: Pelvic organs reveal mild wall thickening of the bladder with incomplete distension. The uterus is grossly unremarkable. Peritoneum/Retroperitoneum: There is no abdominal retroperitoneal lymphadenopathy. There is mild stranding identified in the root of the mesentery as well as surrounding the pancreas in which mild inflammation of the pancreas cannot be completely excluded. Correlation to clinical lab values is recommended. No pelvic adenopathy with moderate atherosclerotic disease seen within the abdominal aorta and iliac vessels. Free fluid identified throughout the upper abdomen. Extensive vascular calcifications seen within the mesenteric vessels as well as the abdominal aorta. Bones/Soft Tissues: The bony structures reveal extensive degenerative changes seen within the spine and pelvis. Severe degenerative changes seen within the sacroiliac joints bilaterally right greater than left some sclerosis on the right to suggest prior healed sacroiliitis. There is extensive anasarca seen within the subcutaneous tissues. Increasing fluid within the abdomen when compared to the prior study. The anasarca is also increased in appearance of the prior examination. Mild stranding seen around the mesentery which correlation to clinical lab values is recommended to exclude possible pancreatitis or volume overload. Overall the appearance of the pancreas itself is grossly unremarkable. There is only mild stranding seen throughout the mesenteric root.      CT HEAD WO CONTRAST  Result Date: 7/5/2022  No acute intracranial abnormality or significant change in the overall appearance of the brain. MRI would be useful if symptoms persist. RECOMMENDATIONS: Unavailable     CT CHEST WO CONTRAST    Result Date: 7/5/2022  FINDINGS: Mediastinum: The cardiac size is enlarged. There is a small pericardial effusion. Minimal probable gas in the right atrium is likely secondary to recent intravenous injection. The esophagus is normal.  No definite mediastinal masses or lymphadenopathy. Lungs/pleura: There is consolidation posterior right upper lobe and right middle lobe with linear density in the lingula and at the left posterior lung base. There is right lower lobe consolidation and moderate right pleural effusion. Upper Abdomen: There is a small volume of right upper quadrant ascites. There are surgical clips in the gallbladder fossa and right upper anterior abdominal wall. Soft Tissues/Bones: There is mild induration of the subcutaneous fat. There is moderate lower thoracic spondylosis with slight levoconvex curvature of the thoracic spine. No definite lytic or blastic osseous lesions. Bilateral lung infiltrates with greatest consolidation right lower lobe probably secondary to atelectasis and or pneumonia. Moderate right pleural effusion. Cardiomegaly and small pericardial effusion. The pericardial effusion is new. Small volume right upper abdominal ascites along with anasarca. This is new. CT GUIDED CHEST TUBE  Result Date: 7/8/2022  [facility] After obtaining informed consent, following the routine sterile prep and drape and after the administration of local anesthesia a needle was inserted into the right pleural space . Serous fluid was aspirated. Subsequently a guidewire was passed through the needle. Subsequently the needle was removed and over the guidewire the tract was dilated. Following dilatation a locking loop catheter was placed. Post procedure CT scan reveals the tube to be in good position. Specimen was sent to the laboratory.  The patient tolerated the procedure well. There were no complications. Prior to the procedure a timeout occurred at  1542 hours. The patient received 9 second  of  fluoroscopy. The patient received local anesthesia. The patient was monitored for 60 minutes by a registered nurse. Successful uncomplicated  right chest tube placement Recommendations: 1. Follow-up tube check in 2 weeks 2. Flush tube daily with 5 to 10 mL of sterile saline     MRI BRAIN WO CONTRAST  Result Date: 7/8/2022  FINDINGS: INTRACRANIAL STRUCTURES/VENTRICLES: There is no acute infarct. No mass effect, edema or hemorrhage is seen. Mild volume loss is seen in the cerebrum with mild chronic microvascular ischemic changes. No hydrocephalus or extra-axial fluid is seen. ORBITS: Prosthetic lenses are seen in the globes bilaterally. The orbits are otherwise grossly unremarkable. SINUSES: Mild mucosal thickening in the paranasal sinuses. There is pooling of secretions in the nasopharynx. Moderate bilateral mastoid effusions. BONES/SOFT TISSUES: The bone marrow signal intensity appears normal. The soft tissues demonstrate no acute abnormality. 1.  No acute intracranial abnormality. 2. No gross epileptogenic lesion is identified. 3. Bilateral mastoid effusions, which may be due to eustachian tube dysfunction or otomastoiditis. RECOMMENDATIONS: Unavailable     US DUP LOWER EXTREMITIES BILATERAL VENOUS  Within the visualized vessels there is no evidence for deep venous thrombosis       Assessment: 1. Acute hypoxic hypercapnic respiratory failure 2/2 mod R pleural effusion, possible HAP vs acute decompensated  HFpEF exacerbation (EF 70-75%)  2. Pulmonary hypertension likely Type II 2/2 acute decompensated diastolic heart failure, in the setting of Hx SERENA  3. Moderate R pleural effusion, likely 2/2 HAP (Klebsiella pneumoniae) vs acute decompensated heart failure. D4 CT, 250 cc output in 24 hrs  4.  HAP (Klebsiella pneumoniae on resp culture, light growth). D1 cefepime  5. Elevated pro-BNP, multifactorial, likely acute decompensated HFpEF, septic shock. 6. Elevated troponin likely 2/2 demand ischemia  7. Permanent atrial fibrillation, s/p DCCV (April and May 2022), now rate controlled. 8. SHANTANU Stage III on CKD multifactorial, pre-renal (hemodynamically mediated, DHN 2/2 diuretic use, poor oral intake); vs ATN 2/2 septic shock. 9. Hypernatremia likely 2/2 over diuresis  10. Metabolic alkalosis likely contraction alkalosis 2/2 diuresis, poor oral intake  11. Pancytopenia likely 2/2 BM suppression 2/2 chronic illness, meds (Zosyn)  12. Acute on chronic macrocytic anemia, multifactorial, likely 2/2 chronic disease, blood draws,  Less likely hemolysis (no schistocytes, haptoglobin wnl) s/p 1 unit PRBC 7/11, FOBT positive today, with pinkish tinge on chest tube output  13. Thrombocytopenia,likely 2/2 #11, less likely HIT (not exposed to heparin products), DIC, hemolysis ruled out (work-up negative)  14. Prolonged INR, ? HFP wnl, no overt bleeding, apixaban and aspirin on hold. Given Vit K yesterday for INR 3.2>2 today  15. Low grade fever, multifactorial, ? meds (Precedex?), CLABSI?, no new leukocytosis, or hemodynamic instability   16. Hx of asthma  17. Hx of SERENA. Not using CPAP at home  18. Hx of HTN. -160`s  19. Hx of CAD  20. Hx of Type 2 DM with neuropathy. On Lantus 10 and Novolog SS at home  21. Hx of depression/anxiety. 22. Hx of restless leg syndrome  23. Hx of Parkinson's disease.  On Sinemet and ropinirole at home, which was discontinued upon discharge at [address]:   Nelly Castro DO DO, MPH, [facility]  Professor of Internal Medicine  Pulmonary, Critical Care and Sleep Medicine

## 2022-07-21 NOTE — PROGRESS NOTES
Senior Resident Addendum:  I have seen and examined the patient with the intern. I have discussed the case, including pertinent history and exam findings with the intern. I agree with the assessment, plan and orders as documented above with the following additions:    Assessment:  Acute hypoxic respiratory failure 2/2 acute HFpEF exacerbation, pleural effusion, chest tube in place. Acute HFpEF, compensated, on Lasix drip. Oropharyngeal dysphagia, modified barium swallow done, recommend puréed solids diet  Acute on chronic anemia, ? GI bleed 2/2 gastritis hemoglobin stable 8.2. No active bleeding. SHANTANU, likely 2/2 diuretic use, creatinine 1.3  Controlled hypertension. History of A. fib, [id number] Score: 5, on Eliquis       Plan:  Continue oxygen support. Chest tube draining 140 mL today. Discussed with pulmonology, will remove chest tube today. Continue Lasix drip per nephrology, stop Diuril, given Diamox for metabolic alkalosis. Monitor urine output, creatinine. Consider stop diuretic if creatinine continues to trend up. Diet change as recommended. Monitor blood glucose AC/HS  Monitor daily CBC, continue aspirin and Eliquis. Continue PPI, Carafate.     Disposition:Current care    Saurav Farris MD, PGY-3  7/21/2022  3:52 PM

## 2022-07-21 NOTE — PROGRESS NOTES
[facility]  Internal Medicine Residency Program  Progress Note - House Team     Patient:  Bradley Nunez 66 y.o. female MRN: [id number]     Date of Service: 7/21/2022     CC: AMS    Days since admission: 16    Subjective     Overnight events:   SLP reconsulted for re-eval but they recommend a modified barium swallow   Per nephro rec - continue Lasix drip increased Diuril BID and started acetazolamide     Patient is regaining her voice. She denies any headache, vision changes or fever. She claims to have no chest pain, shortness of breath or abdominal pain. She believes her abdomen continues to get smaller. Objective     Physical Exam:  Vitals: /64   Pulse 65   Temp 97.7 °F (36.5 °C) (Oral)   Resp 18   Ht 5' (1.524 m)   Wt 209 lb 4 oz (94.9 kg)   SpO2 98%   BMI 40.87 kg/m²     I & O - 24hr:   Intake/Output Summary (Last 24 hours) at 7/21/2022 1520  Last data filed at 7/21/2022 1420  Gross per 24 hour   Intake 528.17 ml   Output 3010 ml   Net -2481.83 ml      General Appearance: alert, appears stated age, and cooperative  HEENT:  Head: Normocephalic, no lesions, without obvious abnormality. Neck: no JVD  Lung: clear to auscultation bilaterally  Heart: regular rate and rhythm, S1, S2 normal, no murmur, click, rub or gallop  Abdomen: soft, non-tender; bowel sounds normal; no masses,  no organomegaly. Still has some fluid present. Extremities:  extremities normal, atraumatic, no cyanosis or edema. Previous edema of the legs appears to be gone. Musculokeletal: No joint swelling, no muscle tenderness. ROM normal in all joints of extremities.    Neurologic: Mental status: Alert, oriented, thought content appropriate  Subject  Pertinent Information & Imaging Studies, Consults   genesis  CBC with Differential:    Lab Results   Component Value Date/Time    WBC 5.0 07/21/2022 04:34 AM    RBC 2.99 07/21/2022 04:34 AM    HGB 8.2 07/21/2022 04:34 AM    HCT 28.3 07/21/2022 04:34 AM     07/21/2022 04:34 AM    MCV 94.6 07/21/2022 04:34 AM    MCH 27.4 07/21/2022 04:34 AM    MCHC 29.0 07/21/2022 04:34 AM    RDW 17.5 07/21/2022 04:34 AM    NRBC 0.0 03/15/2019 09:10 AM    SEGSPCT 74 08/20/2013 10:45 AM    METASPCT 0.9 07/14/2022 05:31 AM    LYMPHOPCT 13.3 07/21/2022 04:34 AM    MONOPCT 6.0 07/21/2022 04:34 AM    MYELOPCT 2.6 07/14/2022 05:31 AM    EOSPCT 1 06/16/2017 12:00 AM    BASOPCT 0.8 07/21/2022 04:34 AM    MONOSABS 0.30 07/21/2022 04:34 AM    LYMPHSABS 0.66 07/21/2022 04:34 AM    EOSABS 0.06 07/21/2022 04:34 AM    BASOSABS 0.04 07/21/2022 04:34 AM     BMP:    Lab Results   Component Value Date/Time     07/21/2022 04:34 AM    K 3.5 07/21/2022 04:34 AM    K 5.0 07/06/2022 02:41 AM    CL 90 07/21/2022 04:34 AM    CO2 40 07/21/2022 04:34 AM    BUN 22 07/21/2022 04:34 AM    LABALBU 3.7 07/21/2022 04:34 AM    CREATININE 1.3 07/21/2022 04:34 AM    CALCIUM 8.7 07/21/2022 04:34 AM    GFRAA 49 07/21/2022 04:34 AM    LABGLOM 40 07/21/2022 04:34 AM    GLUCOSE 170 07/21/2022 04:34 AM     BUN/Creatinine:    Lab Results   Component Value Date/Time    BUN 22 07/21/2022 04:34 AM    CREATININE 1.3 07/21/2022 04:34 AM     Hepatic Function Panel:    Lab Results   Component Value Date/Time    ALKPHOS 84 07/21/2022 04:34 AM    ALT <5 07/21/2022 04:34 AM    AST 6 07/21/2022 04:34 AM    PROT 5.8 07/21/2022 04:34 AM    BILITOT 0.7 07/21/2022 04:34 AM    BILIDIR 0.2 07/20/2013 06:20 PM    LABALBU 3.7 07/21/2022 04:34 AM     Albumin:    Lab Results   Component Value Date/Time    LABALBU 3.7 07/21/2022 04:34 AM       IMAGING:   Imaging Studies:    XR CHEST PORTABLE    Result Date: 7/15/2022  No sizable pleural effusion.             PROCEDURES: modified barium swallow      Notable Cultures:      Blood cultures   Blood Culture, Routine   Date Value Ref Range Status   07/05/2022 5 Days no growth  Final     Respiratory cultures No results found for: RESPCULTURE   Gram Stain Result   Date Value Ref Range Status   07/20/2022 Refer to ordered Gram stain for results  Final     Urine   Creatinine Ur POCT   Date Value Ref Range Status   06/17/2019 50 mg/dl  Final     Urine Culture, Routine   Date Value Ref Range Status   07/05/2022 Growth not present  Final     Legionella No results found for: LABLEGI  C Diff PCR No results found for: CDIFPCR  Wound culture/abscess: No results for input(s): WNDABS in the last 72 hours. Tip culture:No results for input(s): CXCATHTIP in the last 72 hours. Antibiotic  Days  Day started                                  OXYGENATION: 3L nasal canula    DIET: SLP reevaluation after modified barium swallow recommends Pureed consistency solids with nectar consistency         Resident's Assessment and Plan     Corene Landau is a 66 y.o. female with  has a past medical history of A-fib ([facility] [address] Acute on chronic congestive heart failure ([facility] [address] Anxiety, Asthma, CAD (coronary artery disease), Cancer ([facility] [address] Chronic kidney disease, Depression, Diabetes mellitus ([facility] [address] H/O mammogram, Hx MRSA infection, Hyperlipidemia, Hypertension, Lateral epicondylitis, SERENA on CPAP, Parkinson's disease ([facility] [address] and Tubal ligation status. came here with CC   Chief Complaint   Patient presents with    Altered Mental Status     per ems family reports ams x 45 minutes, recent psych med changes and right toe amputation        SUMMARY OF HOSPITAL STAY: Briefly, pt is a 66year old woman admitted for acute hypoxic hypercapnic resp failure and AMS likely 2/2 pleural effusion, possible HAP and acute decompensated HFpEF exacerbation (EF 70-75%), and septic shock likely 2/2 pneumonia, hypernatremia and lactic acidosis. She was intubated and put in the ICU and extubated after improvement on 7/10/22. Patient has completed a 10 day course of cefepime 2000 mg IV q12hr and vancomycin for possible HAP. Patient had an episode of black diarrhea and was FOBT positive on 7/16/22.  Patient's Hb dropped to 6.7 which required a packed RBC on 7/17/22 which brought the Hb up to 8.3 GS performed EGS (7/19) showing moderate diffuse gastritis and duodenitis. Chest tube placed (7/8) to drain pleural effusion likely due to heart failure. Pleural fluid analysis indicates transudative effusion. Chest tube removed (7/21) after repeat pleural fluid studies also indicated transudative effusion. Days since admission: 16    Consults:   Critical Care  Pulmonology  Palliative care  Cardiology  Nephrology  GS    Assessment and Plan:    Neurology  Acute encephalopathy - resolved  Intubated in ICU d/t AMS  Extubated on 7/10/22  Plan:  Hold home depakote d/t it's association with encephalopathy  Hx of Parkinson  Stable  Plan:  Continue home carbidopa-levodopa  Hx of restless leg syndrome  Patient complaining of worsening during night time  Plan:  Continue home ropinirole 1mg TID  Hx of depression and anxiety  Plan:  Continue home quetiapine     Cardiovascular  Hx of permanent A-fib  DCCV in April and May 2022, currently rate controlled  Plan:  Continue with metoprolol and amiodarone  Continue Eliquis  Hold digoxin d/t previus toxicity  Hx of HTN  Plan:  Continue metoprolol  Hx of CAD  Plan:  Continue ASA  Hx of HLD  Plan:  Continue atorvastatin 20mg  Anasarca 2/2 acute decompensated diastolic heart failure  Hx of heart failure w/ preserved EF (70-75%, mild tricuspid regurg) last echo done 7/7/22  Plan:  Continue metoprolol  Nephro recommends continue lasix, IV diuril BID, IV acetazolamide, Follow renal status, low Na diet and fluid restriction      Pulmonology  Moderate RIGHT pleural effusion likely 2/2 acute decompensated HF vs ? HAP  Most recent CR on 7/15/22 shoed no sizable pleural effusion compared to the CXR on 7/13/22  Finished 10 day course of cefepime and vanc  Transudative pleural effusion on 7/8/22.  Repeat pleural effusion analysis showed similar findings 7/20/22, however same time serum LDH and protein were not obtained   Last net output was -3.6 liters for net of -19L since admission   Chest tube (placed 7/8) last output was 140ml. Per rec of pulmonology, chest tube removed (7/21)  Plan:  Monitor resp status, wean O2 as able. Pulmonology following. Hx of asthma  Plan:  Continue ipratropium-albuterol 1 ampule, inhalation Q4H WA  Hx of SERENA  Patient not using CPAP at home     Gastrointestinal  Mild-Moderate Oropharyngeal dysphasia  SLP re-evaluation  Modified barium swallow showed impaired swallowing mechanism with moderate laryngeal penetration with thin liquid barium. No barium aspiration. Plan:  Changed diet to purred     Renal  Metabolic alkalosis likely 2/2 diuresis vs SERENA  Normal bicarb on admission, currently 40.   Plan:  Acetazolamide started   SHANTANU likely 2/2 diuresis   Cr increased 1 > 1.3   Plan:  Per nephro rec - continue the diuretics but stop if Cr continues to rise tomorrow      Endocrinology  Hx of type 2 DM w/ neuropathyOn lantus 10 and Novolog SS at home  Plan:  Insulin glargine 5 units SQ nightly and insulin lispro 0-6 units SQ 4X daily AC and HS     Infectious disease  Septic shock likely 2/2 CAP - resolved  Lactic acidosis - resolved  Patient finished her 10 day course of vanc + cefepime      Heme-Onc  Acute on chronic anemia   EGD (7/19/22) showed moderate diffuse gastritis and duodenitis with no bleeding  Previous EGD in 2015 showed mild gastritis  FOBT (+) on 7/16/22  Melena 7/15/22  Required 2nd transfusion of 1pRBC on 7/17 Hb 6.7 > 8.3 post and a previous transfusion on of 1 pRBC on 7/8  H pylori negative   Plan:  Continue oral PPI, continue carafate   Monitor for drop of Hb     Problems resolved during this admission:   Acute encephalopathy multifactorial 2/2 septic shock, digoxin toxicity, uremia  Acute hypoxic hypercapnic respiratory failure likely 2/2 to RIGHT pleural effusion, possible HAP vs acute decompensated HFpEF exacerbation (EF 70-75)  Shock likely septic, HAP, digoxin toxicity  HAGMA 2/2 lactic acidiosis (hypoperfusion, septic shock), uremia 2/2 SHANTANU  Elevated troponin  SHANTANU stage III on CKD  Metabolic alkalosis likely contraction alkalosis     PT/OT: Continue  DVT ppx: Eliquis  GI ppx: Protonix 40mg PO BID and carafate       Next of Kin/ POA:  Daughter    Code Status:   FULL code    Disposition: [facility], may need oxygen on discharge         Lacey Vegas MD, PGY-1  Attending physician: Dr. Rios Garza

## 2022-07-21 NOTE — PROGRESS NOTES
[facility]  Internal Medicine Residency / [address]    Attending Physician Statement  I have discussed the case, including pertinent history and exam findings with the resident and the team.  I have seen and examined the patient and the key elements of the encounter have been performed by me. I have also personally reviewed imaging studies and labs. I agree with the assessment, plan and orders as documented by the resident. Assessment:  SHANTANU, metabolic alkalosis may be from diuresis  Acute hypoxic respiratory failure 2/2 to CAP, transudative pleural effusion likely from HFpEF. S/p chest tube insertion, R. Rpt fluid studies still appear transudative. Acute on chronic anemia. Gastritis and duodenitis seen on EGD (7/19/22), no active bleeding and Hgb has been stable. H pylori negative. Moderate oropharyngeal dysphagia   HTN, controlled with current management  RLS, controlled with current management  AF, currently in sinus. Tolerating BB and amiodarone. Septic shock, resolved. Abx course completed. Acute encephalopathy, resolved        Plan:  Holding discharge because of SHANTANU. Discuss diuretics with Nephrology service given SHANTANU  Acetazolamide has been started  Continue ASA and eliquis. Monitor for drop of  hgb, recurrence of bleeding  For barium swallow to assess if we can advance diet  Continue PPI, carafate  Continue holding digoxin, depakote  May need oxygen on discharge  Discuss pleural studies with Pulmonology, may be able to remove chest tube soon  Appreciate care from all consult services. Remainder of medical problems as per resident note. Solo Davis MD  Internal Medicine

## 2022-07-21 NOTE — PROGRESS NOTES
SPEECH/LANGUAGE PATHOLOGY  VIDEOFLUOROSCOPIC STUDY OF SWALLOWING (MBS)   and PLAN OF CARE    PATIENT NAME:  Matt Blanchard  (female)     MRN:  [id number]    :  1950  (66 y.o.)  STATUS:  Inpatient: Room 4508/4508-A    TODAY'S DATE:  2022  REFERRING PROVIDER:   Dr. Mumtaz Card: [place] modified barium swallow with video  Date of order:  22   REASON FOR REFERRAL: dysphagia   EVALUATING THERAPIST: ONEIL Wiggins      RESULTS:      DYSPHAGIA DIAGNOSIS:  mild-moderate oropharyngeal phase dysphagia     DIET RECOMMENDATIONS:  Pureed consistency solids (IDDSI level 4) with  nectar consistency (mildly thick - IDDSI level 2) liquids    FEEDING RECOMMENDATIONS:    Assistance level:  Stand by assistance is needed during all oral intake     Compensatory strategies recommended: No strategies are recommended at this time     Discussed recommendations with nursing and/or faxed report to referring provider: Nursing not available, diet change recommendation placed in Physician sticky note section       SPEECH THERAPY  PLAN OF CARE   The dysphagia POC is established based on physician order and dysphagia diagnosis    Skilled SLP intervention for dysphagia management up to 5x per week until goals met, pt plateaus in function and/or discharged from hospital      Conditions Requiring Skilled Therapeutic Intervention for dysphagia:    Patient is performing below functional baseline d/t  current acute condition, Multiple diagnoses, multiple medications, and increased dependency upon caregivers.     SPECIFIC DYSPHAGIA INTERVENTIONS TO INCLUDE:     Therapeutic exercises  Trials of upgraded diet/liquid     Specific instructions for next treatment:  initiate instruction of therapeutic exercises   Treatment Goals:    Short Term Goals:  Pt will complete laryngeal strength/ ROM therapeutic exercises to improve airway protection for the least restrictive PO diet minimal verbal prompts    Long Term Goals:   Pt will maintain adequate nutrition/hydration via PO intake of the least restrictive oral diet with implementation of safe swallow/ compensatory strategies and decrease signs/symptoms of aspiration to less than 1 x/day. Patient/family Goal:    Did not state. Will further assess during treatment. Plan of care discussed with Patient   The Patient understand(s) the diagnosis, prognosis and plan of care     Rehabilitation Potential/Prognosis: good                      ADMITTING DIAGNOSIS: Shock ([facility] [address] [R57.9]  Uremia [N19]  SHANTANU (acute kidney injury) ([facility] [address] [N17.9]  Poisoning by digoxin, accidental or unintentional, initial encounter [T46.0X1A]  Altered mental status, unspecified altered mental status type [R41.82]  Acute pancreatitis, unspecified complication status, unspecified pancreatitis type [K85.90]     VISIT DIAGNOSIS:   Visit Diagnoses         Codes    Uremia     N19    Poisoning by digoxin, accidental or unintentional, initial encounter     T46.0X1A    Altered mental status, unspecified altered mental status type     R41.82    Acute pancreatitis, unspecified complication status, unspecified pancreatitis type     K85.90                PATIENT REPORT/COMPLAINT: denies difficulty swallowing    PRIOR LEVEL OF SWALLOW FUNCTION:    Past History of Dysphagia?:  none reported    Current Diet Order:  ADULT DIET; Dysphagia - Minced and Moist; 5 carb choices (75 gm/meal);  Low Fat/Low Chol/High Fiber/2 gm Na    PROCEDURE:  Consistencies Administered During the Evaluation   Liquids: thin liquid and nectar thick liquid   Solids:  pureed foods      Method of Intake:   cup, spoon  Fed by clinician      Position:   Seated, upright, Lateral plane    INSTRUMENTAL ASSESSMENT:    ORAL PREP/ ORAL PHASE:    Dentition:  edentulous     PHARYNGEAL PHASE:     ONSET TIME       Onset time of the pharyngeal swallow was adequate       PHARYNGEAL RESIDUALS        Vallecula/Pharyngeal Wall           No significant residuals were noted in the vallecula      Pyriform Sinuses      No significant residuals were noted in the pyriform sinuses     LARYNGEAL PENETRATION   Laryngeal penetration occurred in the absence of aspiration DURING the swallow for thin liquid due to  delayed laryngeal closure which remained in the laryngeal vestibule. . Laryngeal penetration was mild-moderate and occurred inconsistently   an absent cough/throat clear was noted    ASPIRATION  Aspiration  was not present during this evaluation however there is high risk for aspiration  of thin liquid due to laryngeal penetration    PENETRATION-ASPIRATION SCALE (PAS):  THIN 3 = Material enters the airway, remains above the vocal folds, and is not ejected from the airway  MILDLY THICK 1 = Material does not enter the airway  MODERATELY THICK item not administered  PUREE 1 = Material does not enter the airway  HARD SOLID item not administered       COMPENSATORY STRATEGIES    Compensatory strategies were not attempted      STRUCTURAL/FUNCTIONAL ANOMALIES   No structural/functional anomalies were noted    CERVICAL ESOPHAGEAL STAGE :     The cervical esophagus appeared adequate          ___________    Cognition:   Needs frequent verbal cues to maintain adequate alertness    Oral Peripheral Examination   Generalized oral weakness    Current Respiratory Status   3 liters nasal cannula     Parameters of Speech Production  Respiration:  Adequate for speech production  Quality:   Breathy  Intensity: Quiet    Pain: No pain reported. EDUCATION:   The Speech Language Pathologist (SLP) completed education regarding results of evaluation and that intervention is warranted at this time. Learner: Patient  Education: Reviewed results and recommendations of this evaluation and Reviewed diet and strategies  Evaluation of Education:  Needs further instruction    This plan may be re-evaluated and revised as warranted.         Evaluation Time includes thorough review of current medical information, gathering information on past medical history/social history and prior level of function, completion of standardized testing/informal observation of tasks, assessment of data and education on plan of care and goals. [x]The admitting diagnosis and active problem list, have been reviewed prior to initiation of this evaluation.     CPT Code: 34349  dysphagia study    ACTIVE PROBLEM LIST:   Patient Active Problem List   Diagnosis    Depression with anxiety    Osteoporosis    Asthma    Hyperlipidemia    Mitral regurgitation    Obstructive sleep apnea syndrome    Psoriasis    Diabetes mellitus ([facility] [address]    Parkinson's disease ([facility] [address]    Primary hypertension    Microalbuminuria    Morbid obesity (HCC)    RLS (restless legs syndrome)    Generalized weakness    Inability to walk    Hypothyroidism    Chest pain    Acute asthma exacerbation    Asthma exacerbation, mild    New onset a-fib (HCC)    Atrial fibrillation with rapid ventricular response (HCC)    Acute on chronic congestive heart failure ([address]    Coronary artery disease involving native coronary artery of native heart without angina pectoris    Dysphagia    Hepatosplenomegaly    Heart failure (HCC)    Acute diastolic (congestive) heart failure (HCC)    Nonrheumatic tricuspid valve regurgitation    Tobacco abuse    Recurrent syncope    Septic shock (HCC)    Seizure-like activity (HCC)    SHANTANU (acute kidney injury) ([address]    Pancytopenia (HCC)    Thrombocytopenia (HCC)    Encephalopathy acute    Acute respiratory failure with hypoxia (HCC)    Lactic acidosis    Delirium    Acute on chronic anemia    Pleural effusion on right

## 2022-07-21 NOTE — PROGRESS NOTES
[facility] MD Efraín De Luna MD Zerita Lai, MD .  Progress Note      Patient's Name: Servando Baron  7:22 PM  7/21/2022    Subjective  7/7 : seen in her room intubated mechanically ventilated fio2 40 . LE edema 1-2+ . On levophed . UO ok over last 24 hours     7/8 pt not in her room when coming to see her . She is in IR lab. Case d/w RN     7/9 seen in her room d/w ICU resident   Still on some pressors actually BP low when seeing her   Has CT in place with good drainage . Good UO in response to lasix via genao cath . Fio2 40 PEEP 8       7/10 seen in ICU intubated mechanically ventilated fio2 40 PEEP 8   1-2+ edema in UE and LEs   No pressors  On precedex drip     7/11 : seen in ICU extubated earlier today . BP stable on HFNC    7/12 seen in her room ,extubated , on o2/nc . Chest tube in place . Fevers today and precedex drip put on hold .good UO over last 24 hours     7/13 : seen in her room on o2/nc BP stable clinically doing better still with CT along with genao and fecal system    7/14 sitting in chair comfortable on o2/nc BP stable LE edema dependent . 7/15 ; weak on o2/nc . 7/16: In better spirits today. Denies dyspnea on nasal cannula. Receiving breathing treatment. Ongoing fatigue, generalized weakness and debilitation. Good appetite. Continued marked swelling. Asks about eating potato chips, discussed why this would be a bad idea. 7/18: Orthopnea. Unable to lay flat for EGD. Occasional wheezing. No dyspnea at rest at 30 degrees on nasal cannula. Swelling in her thighs sacrum and abdominal pannus seem worse to her. Very little distal swelling. Hungry and thirsty. Ongoing fatigue and generalized weakness. No chest pain or palpitations    7/19: Sleepy, somnolent though easily  awakened. Thinks her swelling may be a little bit better. 7/20: Feeling better. No dyspnea at rest.  Abdominal pannus swelling seems to be a little better. No peripheral swelling.    7/21: Ongoing fatigue and generalized weakness. Denies dyspnea at rest on room air. No peripheral swelling, though still has substantial edema in her abdominal pannus, upper thighs sacrum and lower back. History of Present Ilness:      69-year old female with a past medical history of hypertension, A. fib, CAD, asthma, HFpEF, CKD, SERENA, hyperlipidemia, type II DM, anxiety/depression, Parkinson's disease, restless leg syndrome who presented in the ED for altered mental status. She recently had a right second toe amputation back last month at [facility].  She was discharged after 2 weeks. According to the patient's family, she was doing okay until few hours prior to admission, patient was noted to be acting different yesterday morning. She was noted to be drowsy    Nephrology asked to see for SHANTANU   I did see pt in ICU she is intubated mechanically ventilated . She is on levophed . She has some LE edema. Vent setting stable .    UO marginal .       Allergies:  Ciprofloxacin and Codeine    Current Medications:    acetaZOLAMIDE (DIAMOX) injection 500 mg, Q12H  pantoprazole (PROTONIX) tablet 40 mg, BID AC  apixaban (ELIQUIS) tablet 5 mg, BID  aspirin chewable tablet 81 mg, Daily  sucralfate (CARAFATE) tablet 1 g, 4 times per day  menthol-zinc oxide (CALMOSEPTINE) 0.44-20.6 % ointment, BID  furosemide (LASIX) 100 mg in dextrose 5 % 100 mL infusion, Continuous  0.9 % sodium chloride infusion, PRN  rOPINIRole (REQUIP) tablet 1 mg, TID  benzocaine-menthol (CEPACOL SORE THROAT) lozenge 1 lozenge, Q2H PRN  QUEtiapine (SEROQUEL) tablet 25 mg, BID  metoprolol tartrate (LOPRESSOR) tablet 50 mg, BID  melatonin disintegrating tablet 5 mg, Nightly  labetalol (NORMODYNE;TRANDATE) injection 10 mg, Q6H PRN  hydrALAZINE (APRESOLINE) injection 10 mg, Q6H PRN  amiodarone (CORDARONE) tablet 200 mg, Daily  insulin lispro (HUMALOG) injection vial 0-6 Units, 4x Daily AC & HS  carbidopa-levodopa (SINEMET CR)  MG per extended release tablet 2 tablet, TID  ondansetron (ZOFRAN) injection 4 mg, Q6H PRN  budesonide (PULMICORT) nebulizer suspension 500 mcg, BID  levalbuterol (XOPENEX) nebulization 0.63 mg, Q4H PRN  insulin glargine-yfgn (SEMGLEE-YFGN) injection vial 5 Units, Nightly  ipratropium-albuterol (DUONEB) nebulizer solution 1 ampule, Q4H WA  polyethylene glycol (GLYCOLAX) packet 17 g, BID  atorvastatin (LIPITOR) tablet 20 mg, Daily  sodium chloride flush 0.9 % injection 5-40 mL, 2 times per day  0.9 % sodium chloride infusion, PRN  acetaminophen (TYLENOL) tablet 650 mg, Q6H PRN   Or  acetaminophen (TYLENOL) suppository 650 mg, Q6H PRN  glucose chewable tablet 16 g, PRN  dextrose bolus 10% 125 mL, PRN   Or  dextrose bolus 10% 250 mL, PRN  glucagon (rDNA) injection 1 mg, PRN  dextrose 5 % solution, PRN      Review of Systems:   Unable to obtain due to clinical conditon . Physical exam:   Vital signs BP (!) 96/42   Pulse 72   Temp 97.7 °F (36.5 °C) (Oral)   Resp 18   Ht 5' (1.524 m)   Wt 209 lb 4 oz (94.9 kg)   SpO2 97%   BMI 40.87 kg/m²   Gen : moderate distress  Head : at , nc   Neck : supple , no thyromegaly noted . Eyes : EOMI , PERRLA   CV : tachycardiac 1+ edema in LE   Lungs: good flow heard b/l   Abd : soft , NT , BS + , No Organomegaly appreciated . Skin : soft, dry . Extremities: 2+ pitting edema lower back sacrum and thighs distal lower extremity  Neuro : CN  II-XII grossly intact , no focal neurologic deficit . Psych : cooperative .      Data:   Labs:  CBC with Differential:    Lab Results   Component Value Date/Time    WBC 5.0 07/21/2022 04:34 AM    RBC 2.99 07/21/2022 04:34 AM    HGB 8.2 07/21/2022 04:34 AM    HCT 28.3 07/21/2022 04:34 AM     07/21/2022 04:34 AM    MCV 94.6 07/21/2022 04:34 AM    MCH 27.4 07/21/2022 04:34 AM    MCHC 29.0 07/21/2022 04:34 AM    RDW 17.5 07/21/2022 04:34 AM    NRBC 0.0 03/15/2019 09:10 AM    SEGSPCT 74 08/20/2013 10:45 AM METASPCT 0.9 07/14/2022 05:31 AM    LYMPHOPCT 13.3 07/21/2022 04:34 AM    MONOPCT 6.0 07/21/2022 04:34 AM    MYELOPCT 2.6 07/14/2022 05:31 AM    EOSPCT 1 06/16/2017 12:00 AM    BASOPCT 0.8 07/21/2022 04:34 AM    MONOSABS 0.30 07/21/2022 04:34 AM    LYMPHSABS 0.66 07/21/2022 04:34 AM    EOSABS 0.06 07/21/2022 04:34 AM    BASOSABS 0.04 07/21/2022 04:34 AM     CMP:    Lab Results   Component Value Date/Time     07/21/2022 04:34 AM    K 3.5 07/21/2022 04:34 AM    K 5.0 07/06/2022 02:41 AM    CL 90 07/21/2022 04:34 AM    CO2 40 07/21/2022 04:34 AM    BUN 22 07/21/2022 04:34 AM    CREATININE 1.3 07/21/2022 04:34 AM    GFRAA 49 07/21/2022 04:34 AM    LABGLOM 40 07/21/2022 04:34 AM    GLUCOSE 170 07/21/2022 04:34 AM    PROT 5.8 07/21/2022 04:34 AM    LABALBU 3.7 07/21/2022 04:34 AM    CALCIUM 8.7 07/21/2022 04:34 AM    BILITOT 0.7 07/21/2022 04:34 AM    ALKPHOS 84 07/21/2022 04:34 AM    AST 6 07/21/2022 04:34 AM    ALT <5 07/21/2022 04:34 AM     Ionized Calcium:  No results found for: IONCA  Magnesium:    Lab Results   Component Value Date/Time    MG 1.6 07/21/2022 04:34 AM     Phosphorus:    Lab Results   Component Value Date/Time    PHOS 3.6 07/21/2022 04:34 AM     U/A:    Lab Results   Component Value Date/Time    COLORU Yellow 07/05/2022 05:43 PM    PHUR 5.5 07/05/2022 05:43 PM    WBCUA 1-3 07/05/2022 05:43 PM    RBCUA NONE 07/05/2022 05:43 PM    RBCUA NONE 03/25/2013 09:00 PM    YEAST RARE 10/27/2015 07:18 PM    BACTERIA FEW 07/05/2022 05:43 PM    CLARITYU Clear 07/05/2022 05:43 PM    SPECGRAV 1.025 07/05/2022 05:43 PM    LEUKOCYTESUR Negative 07/05/2022 05:43 PM    UROBILINOGEN 0.2 07/05/2022 05:43 PM    BILIRUBINUR Negative 07/05/2022 05:43 PM    BLOODU Negative 07/05/2022 05:43 PM    GLUCOSEU Negative 07/05/2022 05:43 PM     Microalbumen/Creatinine ratio:  No components found for: RUCREAT  Iron Saturation:  No components found for: PERCENTFE  TIBC:    Lab Results   Component Value Date/Time    TIBC 152 07/09/2022 08:01 AM     FERRITIN:    Lab Results   Component Value Date/Time    FERRITIN 243 07/09/2022 08:01 AM        Imaging:  Fluoroscopy modified barium swallow with video   Final Result   Impaired swallowing mechanism with moderate laryngeal penetration with thin   liquid barium. No barium aspiration. Please see separate speech pathology report for full discussion of findings   and recommendations. XR CHEST PORTABLE   Final Result   No sizable pleural effusion. XR CHEST PORTABLE   Final Result   Mild subsegmental atelectasis in the left lower lobe. XR CHEST PORTABLE   Final Result   Grossly stable pulmonary opacities in the lower left lung, which may   represent atelectasis or pneumonia. XR CHEST PORTABLE   Final Result   1. Patchy right perihilar and left lower lobe airspace disease   2. Status post removal of the endotracheal tube and NG tube      3. There is no pneumothorax. XR CHEST PORTABLE   Final Result   Lines and tubes are stable with no pneumothorax on the right. Some   improvement seen in the region of left lung base in the interval.         XR CHEST PORTABLE   Final Result   The evaluation is limited due to low lung volumes. No interval change compared to prior exam.      Lines and tubes are in unchanged position. XR ABDOMEN FOR NG/OG/NE TUBE PLACEMENT   Final Result   Catheter is in the stomach. XR CHEST PORTABLE   Final Result   Catheter placed with significant right lung aeration improvement and no   pneumothorax         MRI BRAIN WO CONTRAST   Final Result   1. No acute intracranial abnormality. 2. No gross epileptogenic lesion is identified. 3. Bilateral mastoid effusions, which may be due to eustachian tube   dysfunction or otomastoiditis. RECOMMENDATIONS:   Unavailable         CT GUIDED CHEST TUBE   Final Result   Successful uncomplicated  right chest tube placement      Recommendations:   1.  Follow-up tube check in 2 weeks   2. Flush tube daily with 5 to 10 mL of sterile saline            XR CHEST PORTABLE   Final Result   1. There is no interval change in the bilateral perihilar and lower lobe   airspace disease more prominent within the right lung. 2. Moderate size right pleural effusion. 3. There is no pneumothorax. US RETROPERITONEAL COMPLETE   Final Result   1. Marked bilateral renal cortical thinning. Echogenic renal parenchyma. No hydronephrosis. 2.  A Diaz catheter appears to be decompressing the bladder. XR CHEST PORTABLE   Final Result   Increasing infiltrate throughout the right lung persistent left basilar   alveolar infiltrate is well. XR CHEST PORTABLE   Final Result   Adequately positioned right internal jugular central venous line. Otherwise,   no significant change compared to prior exam glued in lines and tubes. US DUP LOWER EXTREMITIES BILATERAL VENOUS   Final Result   Within the visualized vessels there is no evidence for deep venous   thrombosis               XR CHEST PORTABLE   Final Result   1. No significant change. Cardiomegaly with right pleural effusion. 2.  Support tubes remain in appropriate position. CT HEAD WO CONTRAST   Final Result   No acute intracranial abnormality or significant change in the overall   appearance of the brain. MRI would be useful if symptoms persist.      RECOMMENDATIONS:   Unavailable         XR ABDOMEN FOR NG/OG/NE TUBE PLACEMENT   Final Result   Catheter is in the proximal stomach. XR CHEST PORTABLE   Final Result   Adequately positioned endotracheal tube. Nasogastric tube coursing below   diaphragm. Otherwise, stable exam.         CT ABDOMEN PELVIS WO CONTRAST Additional Contrast? None   Final Result   Increasing fluid within the abdomen when compared to the prior study. The   anasarca is also increased in appearance of the prior examination.       Mild stranding seen around the mesentery which \"reequilibrated\"  Low-sodium diet  Fluid restriction      Electronically signed by Najma Almazan MD on 7/21/2022

## 2022-07-22 ENCOUNTER — APPOINTMENT (OUTPATIENT)
Dept: GENERAL RADIOLOGY | Age: 72
DRG: 870 | End: 2022-07-22
Payer: MEDICARE

## 2022-07-22 LAB
ALBUMIN SERPL-MCNC: 3.6 G/DL (ref 3.5–5.2)
ALBUMIN SERPL-MCNC: 3.7 G/DL (ref 3.5–5.2)
ALP BLD-CCNC: 80 U/L (ref 35–104)
ALT SERPL-CCNC: <5 U/L (ref 0–32)
ANION GAP SERPL CALCULATED.3IONS-SCNC: 10 MMOL/L (ref 7–16)
AST SERPL-CCNC: 6 U/L (ref 0–31)
BASOPHILS ABSOLUTE: 0.04 E9/L (ref 0–0.2)
BASOPHILS RELATIVE PERCENT: 0.8 % (ref 0–2)
BILIRUB SERPL-MCNC: 0.8 MG/DL (ref 0–1.2)
BODY FLUID CULTURE, STERILE: NORMAL
BUN BLDV-MCNC: 22 MG/DL (ref 6–23)
CALCIUM SERPL-MCNC: 9.1 MG/DL (ref 8.6–10.2)
CHLORIDE BLD-SCNC: 91 MMOL/L (ref 98–107)
CO2: 42 MMOL/L (ref 22–29)
CREAT SERPL-MCNC: 1.3 MG/DL (ref 0.5–1)
EOSINOPHILS ABSOLUTE: 0.07 E9/L (ref 0.05–0.5)
EOSINOPHILS RELATIVE PERCENT: 1.5 % (ref 0–6)
GFR AFRICAN AMERICAN: 49
GFR NON-AFRICAN AMERICAN: 40 ML/MIN/1.73
GLUCOSE BLD-MCNC: 149 MG/DL (ref 74–99)
GRAM STAIN RESULT: NORMAL
HCT VFR BLD CALC: 31.2 % (ref 34–48)
HEMOGLOBIN: 9.1 G/DL (ref 11.5–15.5)
IMMATURE GRANULOCYTES #: 0.02 E9/L
IMMATURE GRANULOCYTES %: 0.4 % (ref 0–5)
LACTATE DEHYDROGENASE: 141 U/L (ref 135–214)
LYMPHOCYTES ABSOLUTE: 0.69 E9/L (ref 1.5–4)
LYMPHOCYTES RELATIVE PERCENT: 14.6 % (ref 20–42)
MAGNESIUM: 1.5 MG/DL (ref 1.6–2.6)
MCH RBC QN AUTO: 27.2 PG (ref 26–35)
MCHC RBC AUTO-ENTMCNC: 29.2 % (ref 32–34.5)
MCV RBC AUTO: 93.4 FL (ref 80–99.9)
METER GLUCOSE: 143 MG/DL (ref 74–99)
METER GLUCOSE: 150 MG/DL (ref 74–99)
METER GLUCOSE: 186 MG/DL (ref 74–99)
METER GLUCOSE: 186 MG/DL (ref 74–99)
MONOCYTES ABSOLUTE: 0.36 E9/L (ref 0.1–0.95)
MONOCYTES RELATIVE PERCENT: 7.6 % (ref 2–12)
NEUTROPHILS ABSOLUTE: 3.55 E9/L (ref 1.8–7.3)
NEUTROPHILS RELATIVE PERCENT: 75.1 % (ref 43–80)
PDW BLD-RTO: 17.7 FL (ref 11.5–15)
PHOSPHORUS: 3.5 MG/DL (ref 2.5–4.5)
PLATELET # BLD: 233 E9/L (ref 130–450)
PMV BLD AUTO: 10.9 FL (ref 7–12)
POTASSIUM SERPL-SCNC: 4.4 MMOL/L (ref 3.5–5)
RBC # BLD: 3.34 E12/L (ref 3.5–5.5)
REPORT: NORMAL
SODIUM BLD-SCNC: 143 MMOL/L (ref 132–146)
THIS TEST SENT TO: NORMAL
TOTAL PROTEIN: 5.6 G/DL (ref 6.4–8.3)
TOTAL PROTEIN: 5.6 G/DL (ref 6.4–8.3)
WBC # BLD: 4.7 E9/L (ref 4.5–11.5)

## 2022-07-22 PROCEDURE — 97530 THERAPEUTIC ACTIVITIES: CPT

## 2022-07-22 PROCEDURE — 2060000000 HC ICU INTERMEDIATE R&B

## 2022-07-22 PROCEDURE — S5553 INSULIN LONG ACTING 5 U: HCPCS | Performed by: INTERNAL MEDICINE

## 2022-07-22 PROCEDURE — 6370000000 HC RX 637 (ALT 250 FOR IP): Performed by: SURGERY

## 2022-07-22 PROCEDURE — 84155 ASSAY OF PROTEIN SERUM: CPT

## 2022-07-22 PROCEDURE — 2580000003 HC RX 258: Performed by: SURGERY

## 2022-07-22 PROCEDURE — 6370000000 HC RX 637 (ALT 250 FOR IP): Performed by: INTERNAL MEDICINE

## 2022-07-22 PROCEDURE — 99232 SBSQ HOSP IP/OBS MODERATE 35: CPT | Performed by: INTERNAL MEDICINE

## 2022-07-22 PROCEDURE — 71045 X-RAY EXAM CHEST 1 VIEW: CPT

## 2022-07-22 PROCEDURE — 6360000002 HC RX W HCPCS: Performed by: INTERNAL MEDICINE

## 2022-07-22 PROCEDURE — 83735 ASSAY OF MAGNESIUM: CPT

## 2022-07-22 PROCEDURE — 85025 COMPLETE CBC W/AUTO DIFF WBC: CPT

## 2022-07-22 PROCEDURE — 83615 LACTATE (LD) (LDH) ENZYME: CPT

## 2022-07-22 PROCEDURE — 92526 ORAL FUNCTION THERAPY: CPT

## 2022-07-22 PROCEDURE — 80053 COMPREHEN METABOLIC PANEL: CPT

## 2022-07-22 PROCEDURE — 82962 GLUCOSE BLOOD TEST: CPT

## 2022-07-22 PROCEDURE — 94640 AIRWAY INHALATION TREATMENT: CPT

## 2022-07-22 PROCEDURE — 84100 ASSAY OF PHOSPHORUS: CPT

## 2022-07-22 PROCEDURE — 36415 COLL VENOUS BLD VENIPUNCTURE: CPT

## 2022-07-22 PROCEDURE — 2700000000 HC OXYGEN THERAPY PER DAY

## 2022-07-22 PROCEDURE — 6360000002 HC RX W HCPCS: Performed by: SURGERY

## 2022-07-22 PROCEDURE — 82040 ASSAY OF SERUM ALBUMIN: CPT

## 2022-07-22 RX ORDER — INSULIN GLARGINE-YFGN 100 [IU]/ML
7 INJECTION, SOLUTION SUBCUTANEOUS NIGHTLY
Status: DISCONTINUED | OUTPATIENT
Start: 2022-07-22 | End: 2022-07-23

## 2022-07-22 RX ORDER — MAGNESIUM SULFATE IN WATER 40 MG/ML
2000 INJECTION, SOLUTION INTRAVENOUS ONCE
Status: COMPLETED | OUTPATIENT
Start: 2022-07-22 | End: 2022-07-22

## 2022-07-22 RX ORDER — ACETAZOLAMIDE 500 MG/5ML
500 INJECTION, POWDER, LYOPHILIZED, FOR SOLUTION INTRAVENOUS EVERY 12 HOURS
Status: COMPLETED | OUTPATIENT
Start: 2022-07-22 | End: 2022-07-22

## 2022-07-22 RX ADMIN — ANORECTAL OINTMENT: 15.7; .44; 24; 20.6 OINTMENT TOPICAL at 10:12

## 2022-07-22 RX ADMIN — QUETIAPINE FUMARATE 25 MG: 25 TABLET ORAL at 21:43

## 2022-07-22 RX ADMIN — AMIODARONE HYDROCHLORIDE 200 MG: 200 TABLET ORAL at 10:00

## 2022-07-22 RX ADMIN — SUCRALFATE 1 G: 1 TABLET ORAL at 00:22

## 2022-07-22 RX ADMIN — INSULIN LISPRO 1 UNITS: 100 INJECTION, SOLUTION INTRAVENOUS; SUBCUTANEOUS at 21:44

## 2022-07-22 RX ADMIN — IPRATROPIUM BROMIDE AND ALBUTEROL SULFATE 1 AMPULE: .5; 2.5 SOLUTION RESPIRATORY (INHALATION) at 15:15

## 2022-07-22 RX ADMIN — IPRATROPIUM BROMIDE AND ALBUTEROL SULFATE 1 AMPULE: .5; 2.5 SOLUTION RESPIRATORY (INHALATION) at 12:32

## 2022-07-22 RX ADMIN — SUCRALFATE 1 G: 1 TABLET ORAL at 05:21

## 2022-07-22 RX ADMIN — SUCRALFATE 1 G: 1 TABLET ORAL at 17:39

## 2022-07-22 RX ADMIN — SUCRALFATE 1 G: 1 TABLET ORAL at 11:53

## 2022-07-22 RX ADMIN — BUDESONIDE 500 MCG: 0.5 SUSPENSION RESPIRATORY (INHALATION) at 07:29

## 2022-07-22 RX ADMIN — IPRATROPIUM BROMIDE AND ALBUTEROL SULFATE 1 AMPULE: .5; 2.5 SOLUTION RESPIRATORY (INHALATION) at 07:29

## 2022-07-22 RX ADMIN — ACETAZOLAMIDE 500 MG: 500 INJECTION, POWDER, LYOPHILIZED, FOR SOLUTION INTRAVENOUS at 13:19

## 2022-07-22 RX ADMIN — Medication 5 MG: at 21:51

## 2022-07-22 RX ADMIN — SODIUM CHLORIDE, PRESERVATIVE FREE 10 ML: 5 INJECTION INTRAVENOUS at 21:45

## 2022-07-22 RX ADMIN — PANTOPRAZOLE SODIUM 40 MG: 40 TABLET, DELAYED RELEASE ORAL at 10:00

## 2022-07-22 RX ADMIN — CARBIDOPA AND LEVODOPA 2 TABLET: 25; 100 TABLET, EXTENDED RELEASE ORAL at 15:26

## 2022-07-22 RX ADMIN — ROPINIROLE HYDROCHLORIDE 1 MG: 1 TABLET, FILM COATED ORAL at 10:00

## 2022-07-22 RX ADMIN — QUETIAPINE FUMARATE 25 MG: 25 TABLET ORAL at 10:00

## 2022-07-22 RX ADMIN — BUDESONIDE 500 MCG: 0.5 SUSPENSION RESPIRATORY (INHALATION) at 20:03

## 2022-07-22 RX ADMIN — CARBIDOPA AND LEVODOPA 2 TABLET: 25; 100 TABLET, EXTENDED RELEASE ORAL at 10:00

## 2022-07-22 RX ADMIN — ROPINIROLE HYDROCHLORIDE 1 MG: 1 TABLET, FILM COATED ORAL at 21:43

## 2022-07-22 RX ADMIN — INSULIN LISPRO 1 UNITS: 100 INJECTION, SOLUTION INTRAVENOUS; SUBCUTANEOUS at 17:39

## 2022-07-22 RX ADMIN — ROPINIROLE HYDROCHLORIDE 1 MG: 1 TABLET, FILM COATED ORAL at 15:26

## 2022-07-22 RX ADMIN — SODIUM CHLORIDE, PRESERVATIVE FREE 10 ML: 5 INJECTION INTRAVENOUS at 10:01

## 2022-07-22 RX ADMIN — MAGNESIUM SULFATE HEPTAHYDRATE 2000 MG: 40 INJECTION, SOLUTION INTRAVENOUS at 15:25

## 2022-07-22 RX ADMIN — INSULIN GLARGINE-YFGN 7 UNITS: 100 INJECTION, SOLUTION SUBCUTANEOUS at 21:44

## 2022-07-22 RX ADMIN — ACETAZOLAMIDE 500 MG: 500 INJECTION, POWDER, LYOPHILIZED, FOR SOLUTION INTRAVENOUS at 21:44

## 2022-07-22 RX ADMIN — IPRATROPIUM BROMIDE AND ALBUTEROL SULFATE 1 AMPULE: .5; 2.5 SOLUTION RESPIRATORY (INHALATION) at 20:03

## 2022-07-22 RX ADMIN — APIXABAN 5 MG: 5 TABLET, FILM COATED ORAL at 10:09

## 2022-07-22 RX ADMIN — ATORVASTATIN CALCIUM 20 MG: 20 TABLET, FILM COATED ORAL at 10:01

## 2022-07-22 RX ADMIN — ANORECTAL OINTMENT: 15.7; .44; 24; 20.6 OINTMENT TOPICAL at 00:21

## 2022-07-22 RX ADMIN — CARBIDOPA AND LEVODOPA 2 TABLET: 25; 100 TABLET, EXTENDED RELEASE ORAL at 21:43

## 2022-07-22 RX ADMIN — PANTOPRAZOLE SODIUM 40 MG: 40 TABLET, DELAYED RELEASE ORAL at 17:39

## 2022-07-22 RX ADMIN — ANORECTAL OINTMENT: 15.7; .44; 24; 20.6 OINTMENT TOPICAL at 21:49

## 2022-07-22 RX ADMIN — ASPIRIN 81 MG CHEWABLE TABLET 81 MG: 81 TABLET CHEWABLE at 10:00

## 2022-07-22 RX ADMIN — APIXABAN 5 MG: 5 TABLET, FILM COATED ORAL at 21:44

## 2022-07-22 RX ADMIN — METOPROLOL TARTRATE 50 MG: 50 TABLET, FILM COATED ORAL at 21:43

## 2022-07-22 RX ADMIN — METOPROLOL TARTRATE 50 MG: 50 TABLET, FILM COATED ORAL at 10:00

## 2022-07-22 ASSESSMENT — PAIN SCALES - GENERAL: PAINLEVEL_OUTOF10: 0

## 2022-07-22 NOTE — PROGRESS NOTES
consult services. Remainder of medical problems as per resident note. Parshall Hebert Wu MD  Internal Medicine

## 2022-07-22 NOTE — PROGRESS NOTES
[facility]  Internal Medicine Residency Program  Progress Note - House Team 2    Patient:  Baldemar Ca 66 y.o. female MRN: [id number]     Date of Service: 7/22/2022     Overnight events: Hypotension overnight, blood pressures 70/38. Patient is asymptomatic. Lasix drip was stopped. Subjective     Patient is awake, alert, oriented x3. Patient reported having mild dizziness, denies chest pain, no worsening shortness of breath, no abdominal pain. No fever or chills. Chest tube is still in place draining 135 cc fluid. Objective     Physical Exam:  Vitals: BP (!) 92/46   Pulse 73   Temp 96.8 °F (36 °C) (Oral)   Resp 16   Ht 5' (1.524 m)   Wt 209 lb 3.2 oz (94.9 kg)   SpO2 95%   BMI 40.86 kg/m²     I & O - 24hr: I/O this shift:  In: 120 [P.O.:120]  Out: -    General Appearance: alert, appears stated age, cooperative, and fatigued  HEENT:  Head: Normocephalic, no lesions, without obvious abnormality. Eye: Normal external eye, conjunctiva, lids cornea, MANDEEP. Neck / Thyroid: Supple, no masses, nodes, nodules or enlargement. Neck: no adenopathy, no carotid bruit, supple, symmetrical, trachea midline, and thyroid not enlarged, symmetric, no tenderness/mass/nodules  Lung: clear to auscultation bilaterally and several crackles   Heart: regular rate and rhythm, S1, S2 normal, no murmur, click, rub or gallop, irregularly irregular rhythm, no S3 or S4, and no murmur  Abdomen: soft, non-tender; bowel sounds normal; no masses,  no organomegaly  Extremities:  edema 2+ pitting edema bilateral and wrinkles bilaterally  Musculokeletal: No joint swelling, no muscle tenderness. ROM normal in all joints of extremities.    Neurologic: Mental status: Alert, oriented, thought content appropriate  Subject  Pertinent Labs & Imaging Studies   genesis  CBC with Differential:    Lab Results   Component Value Date/Time    WBC 4.7 07/22/2022 07:31 AM    RBC 3.34 07/22/2022 07:31 AM    HGB 9.1 07/22/2022 07:31 AM    HCT 31.2 07/22/2022 07:31 AM     07/22/2022 07:31 AM    MCV 93.4 07/22/2022 07:31 AM    MCH 27.2 07/22/2022 07:31 AM    MCHC 29.2 07/22/2022 07:31 AM    RDW 17.7 07/22/2022 07:31 AM    NRBC 0.0 03/15/2019 09:10 AM    SEGSPCT 74 08/20/2013 10:45 AM    METASPCT 0.9 07/14/2022 05:31 AM    LYMPHOPCT 14.6 07/22/2022 07:31 AM    MONOPCT 7.6 07/22/2022 07:31 AM    MYELOPCT 2.6 07/14/2022 05:31 AM    EOSPCT 1 06/16/2017 12:00 AM    BASOPCT 0.8 07/22/2022 07:31 AM    MONOSABS 0.36 07/22/2022 07:31 AM    LYMPHSABS 0.69 07/22/2022 07:31 AM    EOSABS 0.07 07/22/2022 07:31 AM    BASOSABS 0.04 07/22/2022 07:31 AM     CMP:    Lab Results   Component Value Date/Time     07/22/2022 07:31 AM    K 4.4 07/22/2022 07:31 AM    K 5.0 07/06/2022 02:41 AM    CL 91 07/22/2022 07:31 AM    CO2 42 07/22/2022 07:31 AM    BUN 22 07/22/2022 07:31 AM    CREATININE 1.3 07/22/2022 07:31 AM    GFRAA 49 07/22/2022 07:31 AM    LABGLOM 40 07/22/2022 07:31 AM    GLUCOSE 149 07/22/2022 07:31 AM    PROT 5.6 07/22/2022 07:31 AM    PROT 5.6 07/22/2022 07:31 AM    LABALBU 3.7 07/22/2022 07:31 AM    LABALBU 3.6 07/22/2022 07:31 AM    CALCIUM 9.1 07/22/2022 07:31 AM    BILITOT 0.8 07/22/2022 07:31 AM    ALKPHOS 80 07/22/2022 07:31 AM    AST 6 07/22/2022 07:31 AM    ALT <5 07/22/2022 07:31 AM     BUN/Creatinine:    Lab Results   Component Value Date/Time    BUN 22 07/22/2022 07:31 AM    CREATININE 1.3 07/22/2022 07:31 AM     Hepatic Function Panel:    Lab Results   Component Value Date/Time    ALKPHOS 80 07/22/2022 07:31 AM    ALT <5 07/22/2022 07:31 AM    AST 6 07/22/2022 07:31 AM    PROT 5.6 07/22/2022 07:31 AM    PROT 5.6 07/22/2022 07:31 AM    BILITOT 0.8 07/22/2022 07:31 AM    BILIDIR 0.2 07/20/2013 06:20 PM    LABALBU 3.7 07/22/2022 07:31 AM    LABALBU 3.6 07/22/2022 07:31 AM     Last 3 Troponin:    Lab Results   Component Value Date/Time    TROPONINI <0.01 10/26/2020 04:11 PM    TROPONINI <0.01 08/24/2019 08:19 PM    TROPONINI <0.01 05/29/2019 04:57 PM       Resident's Assessment and Plan     Mary Henderson is a 66 y.o. female admitted for acute hypoxic hypercapnic resp failure and AMS likely 2/2 pleural effusion, possible HAP and acute decompensated HFpEF exacerbation (EF 70-75%), and septic shock likely 2/2 pneumonia, hypernatremia and lactic acidosis. She was intubated and put in the ICU and extubated after improvement on 7/10/22. Patient has completed a 10 day course of cefepime 2000 mg IV q12hr and vancomycin for possible HAP. Patient had an episode of black diarrhea and was FOBT positive on 7/16/22. Patient's Hb dropped to 6.7 which required a packed RBC on 7/17/22 which brought the Hb up to 8.3 GS performed EGS (7/19) showing moderate diffuse gastritis and duodenitis. Chest tube placed (7/8) to drain pleural effusion likely due to heart failure. Pleural fluid analysis indicates transudative effusion. Chest tube was removed today. Assessment:  Acute hypoxic respiratory failure 2/2 acute HFpEF exacerbation, pleural effusion, stable, on 3L oxygen. chest tube was removed today  Acute HFpEF, compensated, Lasix was stopped 2/2 hypotension  Oropharyngeal dysphagia, modified barium swallow done, recommend puréed solids diet  Acute on chronic anemia, ? GI bleed 2/2 gastritis hemoglobin stable 9.1  No active bleeding. SHANTANU, likely 2/2 diuretic use, stable, creatinine 1.3  Controlled hypertension. History of A. fib, [id number] Score: 5, on Eliquis      Plan:  Continue oxygen support. Remove chest tube today. Check CXR after removal.   Continue to hold Lasix drip, given Diamox for metabolic alkalosis. Monitor urine output, creatinine. Daily weight. Check orthostatic blood pressure  Diet change as recommended. Monitor blood glucose AC/HS, increase Lantus to 7units nightly. Monitor daily CBC, continue aspirin and Eliquis. Continue PPI, Carafate.        GI prophylasix: PPI  DVT prophylasix: Currently on anticoagulation for Afib Hawa Ness MD, PGY-3  Attending physician: Dr. Emir Olivera.

## 2022-07-22 NOTE — PROGRESS NOTES
Physical Therapy  Treatment    Name: Mamadou Delgado  : 1950  MRN: [id number]      Date of Service: 2022    Evaluating PT:  Katty Sneed PT, DPT [id number]    Room #:  3680/8975-V  Diagnosis:  Shock ([facility] [facility] [R57.9]  Uremia [N19]  [facility] [N17.9]  Poisoning by digoxin, accidental or unintentional, initial encounter [T46.0X1A]  Altered mental status, unspecified altered mental status type [R41.82]  Acute pancreatitis, unspecified complication status, unspecified pancreatitis type [K85.90]  PMHx/PSHx:  CHF, a fib, anxiety, asthma, CAD, CKD, DM, HLD, HTN, SERENA, parkinson's disease, tubal ligation status  Procedure/Surgery:  Extubated 7/10  Precautions:  Falls, , O2, Equipment Needs:  TBD    SUBJECTIVE:    Pt lives alone in an apartment with level entry and elevator access. Bed is on 1st floor and bath is on 1st floor. Pt ambulated with rollator PTA. Pt reports having conversation with son about transitioning to assisted living, but has not yet been able to. OBJECTIVE:   Initial Evaluation  Date: 22 Treatment  Date:22 Short Term/ Long Term   Goals   AM-PAC 6 Clicks 6/43 15/51    Was pt agreeable to Eval/treatment? yes yes    Does pt have pain?  L hip pain during mobility No c/o pain    Bed Mobility  Rolling: MaxA x2  Supine to sit: MaxA x2  Sit to supine: maxA x2  Scooting: MaxA x2 Rolling: NT  Supine to sit: NT  Sit to supine: NT  Scooting: SBA Rolling: Shell  Supine to sit: Shell  Sit to supine: Shell  Scooting: Shell   Transfers Sit to stand: Shell x2  Stand to sit: Shell x2  Stand pivot: NT Sit to stand: ModA  Stand to sit: Shell  Stand pivot: Shell with [employer] Sit to stand: SBA  Stand to sit: SBA  Stand pivot: SBA with [employer]   Ambulation    NT, unable 10' Shell WW 50 feet with [employer] SBA   Stair negotiation: ascended and descended  NT NT TBD   ROM BUE:  Defer to OT note  BLE:  WFL     Strength BUE:  Defer to OT note  BLE:  B hip flex 3/5, B knee ext 3+/5, B dorsiflex 3+/5  [facility] Balance Sitting EOB:  SBA<>Shell  Dynamic Standing:  Shell x2 with [address] Sitting EOB:  SBA  Dynamic Standing:  Shell with [address] Sitting EOB:  Independent   Dynamic Standing:  SBA with [address]   Pt is A & O x 4  Sensation:  NT  Edema:  none noted    Patient education  Pt educated on role of PT, importance of continued cooperation with skilled therapy for improved functional mobility    Patient response to education:   Pt verbalized understanding Pt demonstrated skill Pt requires further education in this area   yes yes yes     ASSESSMENT:    Comments:  Patient agreeable to PT seated in bedside chair. Pt requires assistance and cues for sit to stand transfers due to weakness. Pt ambulating at slow speed with steadying assist throughout. Distance limited by fatigue. Patient would benefit from continued skilled PT to maximize functional mobility independence. Treatment:  Patient practiced and was instructed in the following treatment:    Bed Mobility: VCs provided for sequencing and safety during mobility. Manual assist provided for completion of task. Transfer Training: Verbal and tactile cueing provided for sequencing and safety during mobility. Manual assist provided for completion of task  Therapeutic Activities: pt sat EOB for approximately 10 minutes during session with no assist for static and dynamic sitting balance. Patient Education: Education provided on importance of OOB activity for improved outcomes    PLAN:    Patient is making fair progress towards established goals. Will continue with current POC.       Time in  1041  Time out  1051    Total Treatment Time  10 minutes     CPT codes:  [] Gait training 78868 - minutes  [] Manual therapy 49670 - minutes  [x] Therapeutic activities 88546 10 minutes  [] Therapeutic exercises 27886 - minutes  [] Neuromuscular reeducation 93593 - minutes    Bassem Chávez PT, DPT  [id number]

## 2022-07-22 NOTE — PROGRESS NOTES
Pt seen for dysphagia x2 . Results and recommendations of swallow study reviewed with patient. Laryngeal elevation and tongue base retraction exercises completed fair. Pt was instructed to continue exercises independently throughout the day.   Will continue    Nectar thick liquids were recommended follow a MBS yesterday due to laryngeal penetration of thin liquid

## 2022-07-22 NOTE — PLAN OF CARE
Problem: Chronic Conditions and Co-morbidities  Goal: Patient's chronic conditions and co-morbidity symptoms are monitored and maintained or improved  Outcome: Progressing     Problem: Discharge Planning  Goal: Discharge to home or other facility with appropriate resources  Outcome: Progressing     Problem: Pain  Goal: Verbalizes/displays adequate comfort level or baseline comfort level  Outcome: Progressing     Problem: Skin/Tissue Integrity  Goal: Absence of new skin breakdown  Description: 1. Monitor for areas of redness and/or skin breakdown  2. Assess vascular access sites hourly  3. Every 4-6 hours minimum:  Change oxygen saturation probe site  4. Every 4-6 hours:  If on nasal continuous positive airway pressure, respiratory therapy assess nares and determine need for appliance change or resting period.   Outcome: Progressing     Problem: ABCDS Injury Assessment  Goal: Absence of physical injury  Outcome: Progressing     Problem: Safety - Adult  Goal: Free from fall injury  Outcome: Progressing     Problem: Respiratory - Adult  Goal: Achieves optimal ventilation and oxygenation  Outcome: Progressing     Problem: Nutrition Deficit:  Goal: Optimize nutritional status  Outcome: Progressing     Problem: Cardiovascular - Adult  Goal: Maintains optimal cardiac output and hemodynamic stability  Outcome: Progressing     Problem: Metabolic/Fluid and Electrolytes - Adult  Goal: Hemodynamic stability and optimal renal function maintained  Outcome: Progressing     Problem: Neurosensory - Adult  Goal: Absence of seizures  Outcome: Progressing     Problem: Skin/Tissue Integrity - Adult  Goal: Skin integrity remains intact  Outcome: Progressing  Flowsheets (Taken 7/22/2022 0219)  Skin Integrity Remains Intact: Monitor for areas of redness and/or skin breakdown     Problem: Musculoskeletal - Adult  Goal: Return mobility to safest level of function  Outcome: Progressing     Problem: Genitourinary - Adult  Goal: Absence of urinary retention  Outcome: Progressing     Problem: Infection - Adult  Goal: Absence of infection during hospitalization  Outcome: Progressing

## 2022-07-22 NOTE — PROGRESS NOTES
[facility] MD Efraín De Luna MD Zerita Lai, MD .  Progress Note      Patient's Name: Servando Baron  4:26 PM  7/22/2022    Subjective  7/7 : seen in her room intubated mechanically ventilated fio2 40 . LE edema 1-2+ . On levophed . UO ok over last 24 hours     7/8 pt not in her room when coming to see her . She is in IR lab. Case d/w RN     7/9 seen in her room d/w ICU resident   Still on some pressors actually BP low when seeing her   Has CT in place with good drainage . Good UO in response to lasix via genao cath . Fio2 40 PEEP 8       7/10 seen in ICU intubated mechanically ventilated fio2 40 PEEP 8   1-2+ edema in UE and LEs   No pressors  On precedex drip     7/11 : seen in ICU extubated earlier today . BP stable on HFNC    7/12 seen in her room ,extubated , on o2/nc . Chest tube in place . Fevers today and precedex drip put on hold .good UO over last 24 hours     7/13 : seen in her room on o2/nc BP stable clinically doing better still with CT along with genao and fecal system    7/14 sitting in chair comfortable on o2/nc BP stable LE edema dependent . 7/15 ; weak on o2/nc . 7/16: In better spirits today. Denies dyspnea on nasal cannula. Receiving breathing treatment. Ongoing fatigue, generalized weakness and debilitation. Good appetite. Continued marked swelling. Asks about eating potato chips, discussed why this would be a bad idea. 7/18: Orthopnea. Unable to lay flat for EGD. Occasional wheezing. No dyspnea at rest at 30 degrees on nasal cannula. Swelling in her thighs sacrum and abdominal pannus seem worse to her. Very little distal swelling. Hungry and thirsty. Ongoing fatigue and generalized weakness. No chest pain or palpitations    7/19: Sleepy, somnolent though easily  awakened. Thinks her swelling may be a little bit better. 7/20: Feeling better. No dyspnea at rest.  Abdominal pannus swelling seems to be a little better. No peripheral swelling.    7/21: Ongoing fatigue and generalized weakness. Denies dyspnea at rest on room air. No peripheral swelling, though still has substantial edema in her abdominal pannus, upper thighs sacrum and lower back. 7/22: Feeling little bit better. Denies new complaint. ROS unremarkable    History of Present Ilness:      69-year old female with a past medical history of hypertension, A. fib, CAD, asthma, HFpEF, CKD, SERENA, hyperlipidemia, type II DM, anxiety/depression, Parkinson's disease, restless leg syndrome who presented in the ED for altered mental status. She recently had a right second toe amputation back last month at [facility].  She was discharged after 2 weeks. According to the patient's family, she was doing okay until few hours prior to admission, patient was noted to be acting different yesterday morning. She was noted to be drowsy    Nephrology asked to see for SHANTANU   I did see pt in ICU she is intubated mechanically ventilated . She is on levophed . She has some LE edema. Vent setting stable .    UO marginal .       Allergies:  Ciprofloxacin and Codeine    Current Medications:    insulin glargine-yfgn (SEMGLEE-YFGN) injection vial 7 Units, Nightly  acetaZOLAMIDE (DIAMOX) injection 500 mg, Q12H  magnesium sulfate 2000 mg in 50 mL IVPB premix, Once  pantoprazole (PROTONIX) tablet 40 mg, BID AC  apixaban (ELIQUIS) tablet 5 mg, BID  aspirin chewable tablet 81 mg, Daily  sucralfate (CARAFATE) tablet 1 g, 4 times per day  menthol-zinc oxide (CALMOSEPTINE) 0.44-20.6 % ointment, BID  0.9 % sodium chloride infusion, PRN  rOPINIRole (REQUIP) tablet 1 mg, TID  benzocaine-menthol (CEPACOL SORE THROAT) lozenge 1 lozenge, Q2H PRN  QUEtiapine (SEROQUEL) tablet 25 mg, BID  metoprolol tartrate (LOPRESSOR) tablet 50 mg, BID  melatonin disintegrating tablet 5 mg, Nightly  labetalol (NORMODYNE;TRANDATE) injection 10 mg, Q6H PRN  hydrALAZINE (APRESOLINE) injection 10 mg, Q6H PRN  amiodarone (CORDARONE) tablet 200 mg, Daily  insulin lispro (HUMALOG) injection vial 0-6 Units, 4x Daily AC & HS  carbidopa-levodopa (SINEMET CR)  MG per extended release tablet 2 tablet, TID  ondansetron (ZOFRAN) injection 4 mg, Q6H PRN  budesonide (PULMICORT) nebulizer suspension 500 mcg, BID  levalbuterol (XOPENEX) nebulization 0.63 mg, Q4H PRN  ipratropium-albuterol (DUONEB) nebulizer solution 1 ampule, Q4H WA  polyethylene glycol (GLYCOLAX) packet 17 g, BID  atorvastatin (LIPITOR) tablet 20 mg, Daily  sodium chloride flush 0.9 % injection 5-40 mL, 2 times per day  0.9 % sodium chloride infusion, PRN  acetaminophen (TYLENOL) tablet 650 mg, Q6H PRN   Or  acetaminophen (TYLENOL) suppository 650 mg, Q6H PRN  glucose chewable tablet 16 g, PRN  dextrose bolus 10% 125 mL, PRN   Or  dextrose bolus 10% 250 mL, PRN  glucagon (rDNA) injection 1 mg, PRN  dextrose 5 % solution, PRN      Review of Systems:   Unable to obtain due to clinical conditon . Physical exam:   Vital signs BP (!) 92/46   Pulse 68   Temp 96.8 °F (36 °C) (Oral)   Resp 16   Ht 5' (1.524 m)   Wt 209 lb 3.2 oz (94.9 kg)   SpO2 99%   BMI 40.86 kg/m²   Gen : moderate distress  Head : at , nc   Neck : supple , no thyromegaly noted . Eyes : EOMI , PERRLA   CV : tachycardiac 1+ edema in LE   Lungs: good flow heard b/l   Abd : soft , NT , BS + , No Organomegaly appreciated . Skin : soft, dry . Extremities: 2+ pitting edema lower back sacrum and thighs and inferior abdominal pannus, though no swelling in her distal lower and upper extremities  Neuro : CN  II-XII grossly intact , no focal neurologic deficit . Psych : cooperative .      Data:   Labs:  CBC with Differential:    Lab Results   Component Value Date/Time    WBC 4.7 07/22/2022 07:31 AM    RBC 3.34 07/22/2022 07:31 AM    HGB 9.1 07/22/2022 07:31 AM    HCT 31.2 07/22/2022 07:31 AM     07/22/2022 07:31 AM    MCV 93.4 07/22/2022 07:31 AM    MCH 27.2 07/22/2022 07:31 AM MCHC 29.2 07/22/2022 07:31 AM    RDW 17.7 07/22/2022 07:31 AM    NRBC 0.0 03/15/2019 09:10 AM    SEGSPCT 74 08/20/2013 10:45 AM    METASPCT 0.9 07/14/2022 05:31 AM    LYMPHOPCT 14.6 07/22/2022 07:31 AM    MONOPCT 7.6 07/22/2022 07:31 AM    MYELOPCT 2.6 07/14/2022 05:31 AM    EOSPCT 1 06/16/2017 12:00 AM    BASOPCT 0.8 07/22/2022 07:31 AM    MONOSABS 0.36 07/22/2022 07:31 AM    LYMPHSABS 0.69 07/22/2022 07:31 AM    EOSABS 0.07 07/22/2022 07:31 AM    BASOSABS 0.04 07/22/2022 07:31 AM     CMP:    Lab Results   Component Value Date/Time     07/22/2022 07:31 AM    K 4.4 07/22/2022 07:31 AM    K 5.0 07/06/2022 02:41 AM    CL 91 07/22/2022 07:31 AM    CO2 42 07/22/2022 07:31 AM    BUN 22 07/22/2022 07:31 AM    CREATININE 1.3 07/22/2022 07:31 AM    GFRAA 49 07/22/2022 07:31 AM    LABGLOM 40 07/22/2022 07:31 AM    GLUCOSE 149 07/22/2022 07:31 AM    PROT 5.6 07/22/2022 07:31 AM    PROT 5.6 07/22/2022 07:31 AM    LABALBU 3.7 07/22/2022 07:31 AM    LABALBU 3.6 07/22/2022 07:31 AM    CALCIUM 9.1 07/22/2022 07:31 AM    BILITOT 0.8 07/22/2022 07:31 AM    ALKPHOS 80 07/22/2022 07:31 AM    AST 6 07/22/2022 07:31 AM    ALT <5 07/22/2022 07:31 AM     Ionized Calcium:  No results found for: IONCA  Magnesium:    Lab Results   Component Value Date/Time    MG 1.5 07/22/2022 07:31 AM     Phosphorus:    Lab Results   Component Value Date/Time    PHOS 3.5 07/22/2022 07:31 AM     U/A:    Lab Results   Component Value Date/Time    COLORU Yellow 07/05/2022 05:43 PM    PHUR 5.5 07/05/2022 05:43 PM    WBCUA 1-3 07/05/2022 05:43 PM    RBCUA NONE 07/05/2022 05:43 PM    RBCUA NONE 03/25/2013 09:00 PM    YEAST RARE 10/27/2015 07:18 PM    BACTERIA FEW 07/05/2022 05:43 PM    CLARITYU Clear 07/05/2022 05:43 PM    SPECGRAV 1.025 07/05/2022 05:43 PM    LEUKOCYTESUR Negative 07/05/2022 05:43 PM    UROBILINOGEN 0.2 07/05/2022 05:43 PM    BILIRUBINUR Negative 07/05/2022 05:43 PM    BLOODU Negative 07/05/2022 05:43 PM    GLUCOSEU Negative 07/05/2022 05:43 PM     Microalbumen/Creatinine ratio:  No components found for: RUCREAT  Iron Saturation:  No components found for: PERCENTFE  TIBC:    Lab Results   Component Value Date/Time    TIBC 152 07/09/2022 08:01 AM     FERRITIN:    Lab Results   Component Value Date/Time    FERRITIN 243 07/09/2022 08:01 AM        Imaging:  XR CHEST PORTABLE   Final Result   1. Mild basilar atelectasis. 2.  Diminished inspiratory effort and elevation the right hemidiaphragm   similar to the prior examination. 3.  No significant pleural effusion is visible. 4.  No evidence of pneumothorax. Fluoroscopy modified barium swallow with video   Final Result   Impaired swallowing mechanism with moderate laryngeal penetration with thin   liquid barium. No barium aspiration. Please see separate speech pathology report for full discussion of findings   and recommendations. XR CHEST PORTABLE   Final Result   No sizable pleural effusion. XR CHEST PORTABLE   Final Result   Mild subsegmental atelectasis in the left lower lobe. XR CHEST PORTABLE   Final Result   Grossly stable pulmonary opacities in the lower left lung, which may   represent atelectasis or pneumonia. XR CHEST PORTABLE   Final Result   1. Patchy right perihilar and left lower lobe airspace disease   2. Status post removal of the endotracheal tube and NG tube      3. There is no pneumothorax. XR CHEST PORTABLE   Final Result   Lines and tubes are stable with no pneumothorax on the right. Some   improvement seen in the region of left lung base in the interval.         XR CHEST PORTABLE   Final Result   The evaluation is limited due to low lung volumes. No interval change compared to prior exam.      Lines and tubes are in unchanged position. XR ABDOMEN FOR NG/OG/NE TUBE PLACEMENT   Final Result   Catheter is in the stomach.          XR CHEST PORTABLE   Final Result   Catheter placed with significant right lung aeration improvement and no   pneumothorax         MRI BRAIN WO CONTRAST   Final Result   1. No acute intracranial abnormality. 2. No gross epileptogenic lesion is identified. 3. Bilateral mastoid effusions, which may be due to eustachian tube   dysfunction or otomastoiditis. RECOMMENDATIONS:   Unavailable         CT GUIDED CHEST TUBE   Final Result   Successful uncomplicated  right chest tube placement      Recommendations:   1. Follow-up tube check in 2 weeks   2. Flush tube daily with 5 to 10 mL of sterile saline            XR CHEST PORTABLE   Final Result   1. There is no interval change in the bilateral perihilar and lower lobe   airspace disease more prominent within the right lung. 2. Moderate size right pleural effusion. 3. There is no pneumothorax. US RETROPERITONEAL COMPLETE   Final Result   1. Marked bilateral renal cortical thinning. Echogenic renal parenchyma. No hydronephrosis. 2.  A Diaz catheter appears to be decompressing the bladder. XR CHEST PORTABLE   Final Result   Increasing infiltrate throughout the right lung persistent left basilar   alveolar infiltrate is well. XR CHEST PORTABLE   Final Result   Adequately positioned right internal jugular central venous line. Otherwise,   no significant change compared to prior exam glued in lines and tubes. US DUP LOWER EXTREMITIES BILATERAL VENOUS   Final Result   Within the visualized vessels there is no evidence for deep venous   thrombosis               XR CHEST PORTABLE   Final Result   1. No significant change. Cardiomegaly with right pleural effusion. 2.  Support tubes remain in appropriate position. CT HEAD WO CONTRAST   Final Result   No acute intracranial abnormality or significant change in the overall   appearance of the brain.  MRI would be useful if symptoms persist.      RECOMMENDATIONS:   Unavailable         XR ABDOMEN FOR NG/OG/NE TUBE PLACEMENT   Final Result   Catheter is in the proximal stomach. XR CHEST PORTABLE   Final Result   Adequately positioned endotracheal tube. Nasogastric tube coursing below   diaphragm. Otherwise, stable exam.         CT ABDOMEN PELVIS WO CONTRAST Additional Contrast? None   Final Result   Increasing fluid within the abdomen when compared to the prior study. The   anasarca is also increased in appearance of the prior examination. Mild stranding seen around the mesentery which correlation to clinical lab   values is recommended to exclude possible pancreatitis or volume overload. Overall the appearance of the pancreas itself is grossly unremarkable. There   is only mild stranding seen throughout the mesenteric root. CT CHEST WO CONTRAST   Final Result   Bilateral lung infiltrates with greatest consolidation right lower lobe   probably secondary to atelectasis and or pneumonia. Moderate right pleural effusion. Cardiomegaly and small pericardial effusion. The pericardial effusion is new. Small volume right upper abdominal ascites along with anasarca. This is new. XR CHEST PORTABLE   Final Result   New opacity on the right which may relate to pleural effusion with adjacent   atelectasis and or infiltrate. Cardiomegaly and pulmonary vascular   congestion implying elevated left heart filling pressures. Assessment    1. Acute kidney injury non oligouric: improved   Baseline cr 0.9  Etiology of SHANTANU due to decrease effective circulating volume in setting of intravascular volume depletion as evident by her need for levophed and her urine lytes with high avidity for cl and Na . Basically bland urine sediment which make GN/vasculitis unlikely   High BUN in part due to steroids   LE edema noted though she is still on levophed    2. Encephalopathy metabolic multifactorial : Improved    3. Digoxin toxicity s/p digbind. Resolved    4. Septic shock 2/2 PNA : resolved    5.   Right Pleural Effusion with catheter in place for drainage    6.   Edema, marked, multifactorial    SHANTANU resolved  Massive edema persists, though improved with ongoing Lasix drip  Contraction alkalosis    Recommendations  Continue Lasix drip  Do not administer Diuril today, consider for tomorrow  Acetazolamide IV every 12 hours  Continue supportive care  Low-sodium diet  Fluid restriction  If creatinine rises, consider stopping Lasix drip      Electronically signed by Marta Fierro MD on 7/22/2022

## 2022-07-22 NOTE — PROGRESS NOTES
[place]  Department of Internal Medicine  Division of Pulmonary, Critical Care and Sleep Medicine  Progress Note    Matteo Escobar DO, MPH, [facility], FACOI, [address] [facility] Trini CRABTREE MD      Patient: Mamadou Delgado  MRN: [id number]  : 1950    Encounter Time: 3:20 PM     Date of Admission: 2022 12:25 PM    Primary Care Physician: No primary care provider on file. Reason for Consultation: Drain CT management     SUBJECTIVE:    CT removed    OBJECTIVE:     PHYSICAL EXAM:   VITALS:   Vitals:    22 1145 22 1232 22 1315 22 1515   BP: (!) 88/58  (!) 92/46    Pulse: 82  73 68   Resp:    16   Temp:       TempSrc:       SpO2:  95%  99%   Weight:       Height:            Intake/Output Summary (Last 24 hours) at 2022 1520  Last data filed at 2022 0815  Gross per 24 hour   Intake 847.31 ml   Output 1375 ml   Net -527.69 ml          CONSTITUTIONAL:    Alert  SKIN:     No rash,   HEENT:     EOMI, MMM, No thrush  NECK:    No bruits, No JVP appreciated  CV:      Sinus,  No murmur, No rubs, No gallops  PULMONARY:   Couse BS,  No Wheezing, No Rales, No Rhonchi      CT noted  ABDOMEN:     Soft, non-tender. BS normal. No R/R/G  EXT:    No deformities . No clubbing. Edema ower extremity edema, No venous stasis  PULSE:   Appears equal and palpable.   PSYCHIATRIC:  Seems appropriate, No acute psychosis  MS:    Gross weakness  NEUROLOGIC:   The clinical assessment is non-focal     DATA: IMAGING & TESTING:     LABORATORY TESTS:    CBC:   Lab Results   Component Value Date/Time    WBC 4.7 2022 07:31 AM    RBC 3.34 2022 07:31 AM    HGB 9.1 2022 07:31 AM    HCT 31.2 2022 07:31 AM    MCV 93.4 2022 07:31 AM    MCH 27.2 2022 07:31 AM    MCHC 29.2 2022 07:31 AM    RDW 17.7 2022 07:31 AM     2022 07:31 AM    MPV 10.9 2022 07:31 AM     CMP:    Lab Results Component Value Date/Time     07/22/2022 07:31 AM    K 4.4 07/22/2022 07:31 AM    K 5.0 07/06/2022 02:41 AM    CL 91 07/22/2022 07:31 AM    CO2 42 07/22/2022 07:31 AM    BUN 22 07/22/2022 07:31 AM    CREATININE 1.3 07/22/2022 07:31 AM    GFRAA 49 07/22/2022 07:31 AM    LABGLOM 40 07/22/2022 07:31 AM    GLUCOSE 149 07/22/2022 07:31 AM    PROT 5.6 07/22/2022 07:31 AM    PROT 5.6 07/22/2022 07:31 AM    LABALBU 3.7 07/22/2022 07:31 AM    LABALBU 3.6 07/22/2022 07:31 AM    CALCIUM 9.1 07/22/2022 07:31 AM    BILITOT 0.8 07/22/2022 07:31 AM    ALKPHOS 80 07/22/2022 07:31 AM    AST 6 07/22/2022 07:31 AM    ALT <5 07/22/2022 07:31 AM     Calcium:    Lab Results   Component Value Date/Time    CALCIUM 9.1 07/22/2022 07:31 AM     Ionized Calcium:  No results found for: IONCA  Magnesium:    Lab Results   Component Value Date/Time    MG 1.5 07/22/2022 07:31 AM     Phosphorus:    Lab Results   Component Value Date/Time    PHOS 3.5 07/22/2022 07:31 AM     PT/INR:    Lab Results   Component Value Date/Time    PROTIME 20.9 07/21/2022 04:34 AM    INR 1.9 07/21/2022 04:34 AM        PRO-BNP:   Lab Results   Component Value Date    PROBNP 37,248 (H) 07/12/2022    PROBNP 41,368 (H) 07/06/2022      ABGs:   Lab Results   Component Value Date/Time    PH 7.452 07/11/2022 05:07 AM    PO2 133.6 07/11/2022 05:07 AM    PCO2 40.0 07/11/2022 05:07 AM     Hemoglobin A1C: No components found for: HGBA1C    IMAGING:  Imaging tests were completed and reviewed and discussed radiology and care team involved and reveals   Echo Complete    Result Date: 7/7/2022  Findings   Left Ventricle  Micro-bubble contrast injected to enhance left ventricular visualization. Normal left ventricular size and systolic function. Ejection fraction is visually estimated at 70-75%. Indeterminate diastolic function. No regional wall motion abnormalities seen. Mild left ventricular concentric hypertrophy noted. Right Ventricle  Moderately dilated right ventricle. limits. The kidneys are unremarkable in appearance. GI/Bowel: Stomach is within normal limits. No mucosal abnormality. Stool seen scattered diffusely throughout the colon. There is no wall thickening. No mucosal abnormality or distension of the small bowel. Pelvis: Pelvic organs reveal mild wall thickening of the bladder with incomplete distension. The uterus is grossly unremarkable. Peritoneum/Retroperitoneum: There is no abdominal retroperitoneal lymphadenopathy. There is mild stranding identified in the root of the mesentery as well as surrounding the pancreas in which mild inflammation of the pancreas cannot be completely excluded. Correlation to clinical lab values is recommended. No pelvic adenopathy with moderate atherosclerotic disease seen within the abdominal aorta and iliac vessels. Free fluid identified throughout the upper abdomen. Extensive vascular calcifications seen within the mesenteric vessels as well as the abdominal aorta. Bones/Soft Tissues: The bony structures reveal extensive degenerative changes seen within the spine and pelvis. Severe degenerative changes seen within the sacroiliac joints bilaterally right greater than left some sclerosis on the right to suggest prior healed sacroiliitis. There is extensive anasarca seen within the subcutaneous tissues. Increasing fluid within the abdomen when compared to the prior study. The anasarca is also increased in appearance of the prior examination. Mild stranding seen around the mesentery which correlation to clinical lab values is recommended to exclude possible pancreatitis or volume overload. Overall the appearance of the pancreas itself is grossly unremarkable. There is only mild stranding seen throughout the mesenteric root. CT HEAD WO CONTRAST  Result Date: 7/5/2022  No acute intracranial abnormality or significant change in the overall appearance of the brain.  MRI would be useful if symptoms persist. RECOMMENDATIONS: Unavailable     CT CHEST WO CONTRAST    Result Date: 7/5/2022  FINDINGS: Mediastinum: The cardiac size is enlarged. There is a small pericardial effusion. Minimal probable gas in the right atrium is likely secondary to recent intravenous injection. The esophagus is normal.  No definite mediastinal masses or lymphadenopathy. Lungs/pleura: There is consolidation posterior right upper lobe and right middle lobe with linear density in the lingula and at the left posterior lung base. There is right lower lobe consolidation and moderate right pleural effusion. Upper Abdomen: There is a small volume of right upper quadrant ascites. There are surgical clips in the gallbladder fossa and right upper anterior abdominal wall. Soft Tissues/Bones: There is mild induration of the subcutaneous fat. There is moderate lower thoracic spondylosis with slight levoconvex curvature of the thoracic spine. No definite lytic or blastic osseous lesions. Bilateral lung infiltrates with greatest consolidation right lower lobe probably secondary to atelectasis and or pneumonia. Moderate right pleural effusion. Cardiomegaly and small pericardial effusion. The pericardial effusion is new. Small volume right upper abdominal ascites along with anasarca. This is new. CT GUIDED CHEST TUBE  Result Date: 7/8/2022  [facility] After obtaining informed consent, following the routine sterile prep and drape and after the administration of local anesthesia a needle was inserted into the right pleural space . Serous fluid was aspirated. Subsequently a guidewire was passed through the needle. Subsequently the needle was removed and over the guidewire the tract was dilated. Following dilatation a locking loop catheter was placed. Post procedure CT scan reveals the tube to be in good position. Specimen was sent to the laboratory. The patient tolerated the procedure well. There were no complications.  Prior to the procedure a timeout occurred at  [phone] hours. The patient received 9 second  of  fluoroscopy. The patient received local anesthesia. The patient was monitored for 60 minutes by a registered nurse. Successful uncomplicated  right chest tube placement Recommendations: 1. Follow-up tube check in 2 weeks 2. Flush tube daily with 5 to 10 mL of sterile saline     MRI BRAIN WO CONTRAST  Result Date: 7/8/2022  FINDINGS: INTRACRANIAL STRUCTURES/VENTRICLES: There is no acute infarct. No mass effect, edema or hemorrhage is seen. Mild volume loss is seen in the cerebrum with mild chronic microvascular ischemic changes. No hydrocephalus or extra-axial fluid is seen. ORBITS: Prosthetic lenses are seen in the globes bilaterally. The orbits are otherwise grossly unremarkable. SINUSES: Mild mucosal thickening in the paranasal sinuses. There is pooling of secretions in the nasopharynx. Moderate bilateral mastoid effusions. BONES/SOFT TISSUES: The bone marrow signal intensity appears normal. The soft tissues demonstrate no acute abnormality. 1.  No acute intracranial abnormality. 2. No gross epileptogenic lesion is identified. 3. Bilateral mastoid effusions, which may be due to eustachian tube dysfunction or otomastoiditis. RECOMMENDATIONS: Unavailable     US DUP LOWER EXTREMITIES BILATERAL VENOUS  Within the visualized vessels there is no evidence for deep venous thrombosis       Assessment: 1. Acute hypoxic hypercapnic respiratory failure 2/2 mod R pleural effusion, possible HAP vs acute decompensated  HFpEF exacerbation (EF 70-75%)  2. Pulmonary hypertension likely Type II 2/2 acute decompensated diastolic heart failure, in the setting of Hx SERENA  3. Moderate R pleural effusion, likely 2/2 HAP (Klebsiella pneumoniae) vs acute decompensated heart failure. D4 CT, 250 cc output in 24 hrs  4. HAP (Klebsiella pneumoniae on resp culture, light growth). D1 cefepime  5. Elevated pro-BNP, multifactorial, likely acute decompensated HFpEF, septic shock.    6. Elevated troponin likely 2/2 demand ischemia  7. Permanent atrial fibrillation, s/p DCCV (April and May 2022), now rate controlled. 8. SHANTANU Stage III on CKD multifactorial, pre-renal (hemodynamically mediated, DHN 2/2 diuretic use, poor oral intake); vs ATN 2/2 septic shock. 9. Hypernatremia likely 2/2 over diuresis  10. Metabolic alkalosis likely contraction alkalosis 2/2 diuresis, poor oral intake  11. Pancytopenia likely 2/2 BM suppression 2/2 chronic illness, meds (Zosyn)  12. Acute on chronic macrocytic anemia, multifactorial, likely 2/2 chronic disease, blood draws,  Less likely hemolysis (no schistocytes, haptoglobin wnl) s/p 1 unit PRBC 7/11, FOBT positive today, with pinkish tinge on chest tube output  13. Thrombocytopenia,likely 2/2 #11, less likely HIT (not exposed to heparin products), DIC, hemolysis ruled out (work-up negative)  14. Prolonged INR, ? HFP wnl, no overt bleeding, apixaban and aspirin on hold. Given Vit K yesterday for INR 3.2>2 today  15. Low grade fever, multifactorial, ? meds (Precedex?), CLABSI?, no new leukocytosis, or hemodynamic instability   16. Hx of asthma  17. Hx of SERENA. Not using CPAP at home  18. Hx of HTN. -160`s  19. Hx of CAD  20. Hx of Type 2 DM with neuropathy. On Lantus 10 and Novolog SS at home  21. Hx of depression/anxiety. 22. Hx of restless leg syndrome  23. Hx of Parkinson's disease.  On Sinemet and ropinirole at home, which was discontinued upon discharge at [address]:   [address] med  Placement            Sabas Hebert DO DO, MPH, [facility]  Professor of Internal Medicine  Pulmonary, Critical Care and Sleep Medicine

## 2022-07-22 NOTE — ADT AUTH CERT
Utilization Reviews         Sepsis and Other Febrile Illness, without Focal Infection - Care Day 17 (7/21/2022) by Deidre Harris RN       Review Status Review Entered   Completed 7/22/2022 14:15      Criteria Review      Care Day: 17 Care Date: 7/21/2022 Level of Care: Intermediate Care    Guideline Day 2    Level Of Care    (X) ICU or floor    7/22/2022 2:15 PM EDT by Deborah Reardon      Intermediate care    Clinical Status    (X) * Hemodynamic stability    7/22/2022 2:15 PM EDT by Jayda Nation: 87, 65, 75, 72, 73    BP: 107/53, 103/64, 91//48, 96/42, 97/54    ( ) * Hypoxemia absent    (X) * Tachypnea absent    7/22/2022 2:15 PM EDT by Deborah Reardon      RR: 18, 12,    Routes    (X) Parenteral or oral medications    7/22/2022 2:15 PM EDT by Deborah Reardon      acetaZOLAMIDE (DIAMOX) injection 500 mg  Dose: 500 mg  Freq: EVERY 12 HOURS Route: IV    amiodarone (CORDARONE) tablet 200 mg  Dose: 200 mg  Freq: DAILY Route: PO    (X) Diet as tolerated    7/22/2022 2:15 PM EDT by Deborah Reardon      ADULT DIET; Dysphagia - Pureed; 5 carb choices (75 gm/meal); Low Fat/Low Chol/High Fiber/2 gm Na    Medications    (X) Possible antimicrobial treatment    7/22/2022 2:15 PM EDT by Deborah Reardon      amiodarone (CORDARONE) tablet 200 mg  Dose: 200 mg  Freq: DAILY Route: PO    * Milestone   Additional Notes   DATE: 07/21               Pertinent Updates: This morning patient was very alert, awake and talkative in a louder voice than her usual whisper. She denies any headache, vision changes or fever. Her throat is a bit sore from the EGD but otherwise is fine. She denies any chest pain, shortness of breath or abdominal pain. She claims her legs feel better and her abdomen is getting less swollen.              Vitals:   97.7,  18,  91/48,  on 3L O2 96% on NC         Abnl/Pertinent Labs/Radiology/Diagnostic Studies:      Glucose: 147, 123, 179, 225   Cl: 90   CO@: 40   Protein: 5.8   Protime INR: 20.9   Rbc: 2.99   Hgb: 8.2   Hct: 28.3      Fluoroscopy modified barium swallow with video :   Impaired swallowing mechanism with moderate laryngeal penetration with thin    liquid barium. No barium aspiration. Physical Exam:   Abdomen: soft, non-tender; bowel sounds normal; no masses,  no organomegaly. Abdomen less swollen but still large. No more tenderness to palpation in the epigastric region               MD Consults/Assessments & Plans:      INT med:      Assessment:   1. SHANTANU, metabolic alkalosis may be from diuresis   2. Acute hypoxic respiratory failure 2/2 to CAP, transudative pleural effusion likely from HFpEF. S/p chest tube insertion, R. Rpt fluid studies still appear transudative. 3. Acute on chronic anemia. Gastritis and duodenitis seen on EGD (7/19/22), no active bleeding and Hgb has been stable. H pylori negative. 4. Moderate oropharyngeal dysphagia   5. HTN, controlled with current management   6. RLS, controlled with current management   7. AF, currently in sinus. Tolerating BB and amiodarone. 8. Septic shock, resolved. Abx course completed. 9. Acute encephalopathy, resolved               Plan:   Holding discharge because of SHANTANU. Discuss diuretics with Nephrology service given SHANTANU   Acetazolamide has been started   Continue ASA and eliquis.  Monitor for drop of  hgb, recurrence of bleeding   For barium swallow to assess if we can advance diet   Continue PPI, carafate   Continue holding digoxin, depakote   May need oxygen on discharge   Discuss pleural studies with Pulmonology, may be able to remove chest tube soon   Appreciate care from all consult services      INT med:      Assessment and Plan:       Neurology   Acute encephalopathy - resolved   Intubated in ICU d/t AMS   Extubated on 7/10/22   Plan:   Hold home depakote d/t it's association with encephalopathy   Hx of Parkinson   Stable   Plan:   Continue home carbidopa-levodopa   Hx of restless leg syndrome Freq: 4 TIMES DAILY BEFORE MEALS & NIGHTLY Route: SC      ipratropium-albuterol (DUONEB) nebulizer solution 1 ampule   Dose: 1 ampule   Freq: EVERY 4 HOURS WHILE AWAKE Route: IN      pantoprazole (PROTONIX) tablet 40 mg   Dose: 40 mg   Freq: 2 TIMES DAILY BEFORE MEALS Route: PO      polyethylene glycol (GLYCOLAX) packet 17 g   Dose: 17 g   Freq: 2 TIMES DAILY Route: PO      QUEtiapine (SEROQUEL) tablet 25 mg   Dose: 25 mg   Freq: 2 TIMES DAILY Route: PO      rOPINIRole (REQUIP) tablet 1 mg   Dose: 1 mg   Freq: 3 TIMES DAILY Route: PO      furosemide (LASIX) 100 mg in dextrose 5 % 100 mL infusion   Rate: 20 mL/hr Dose: 20 mg/hr   Freq: CONTINUOUS Route: IV            Orders:      Above meds         PT/OT/SLP/CM Assessments or Notes:            CM: Nikki Prather auth good through today. CT to gravity continues and lasix gtt. Requested updated therapy notes today.  Pasrr and ambulette on soft chart                         Sepsis and Other Febrile Illness, without Focal Infection - Care Day 16 (7/20/2022) by Tlai Banerjee RN       Review Status Review Entered   Completed 7/21/2022 14:15      Criteria Review      Care Day: 16 Care Date: 7/20/2022 Level of Care: Intermediate Care    Guideline Day 2    Level Of Care    (X) ICU or floor    7/21/2022 2:15 PM EDT by Javier Hence      imcu    Clinical Status    (X) * Hemodynamic stability    7/21/2022 2:15 PM EDT by Javier Hence      97.5 (36.4) 20 81 106/67 -- Semi fowlers -- 3 100 Nasal cannula    ( ) * Hypoxemia absent    7/21/2022 2:15 PM EDT by Javier Hence      o2 3 l nc on    ( ) * Tachypnea absent    7/21/2022 2:15 PM EDT by Javier Hence      rr 20    Activity    ( ) Activity as tolerated    7/21/2022 2:15 PM EDT by Amanda Payton to stand: Shell  Stand to sit: Shell  Stand pivot: Shell with [employer]  patient refused ambulation    Routes    (X) Possible IV fluids    7/21/2022 2:15 PM EDT by Javier Hence      iv lasix infusion    (X) Parenteral or oral medications    7/21/2022 2:15 PM EDT by Yoselin Ann      iv diamox 500 mg bid  iv diuril 500 mg bid  last dose iv protonix 40 mg   iv lasix 100 mg  20 ml hr infusion    (X) Diet as tolerated    7/21/2022 2:15 PM EDT by Yoselin Ann      dysphagia diet carb control low fat low chol hi fiber 2 gm na    Interventions    (X) WBC    7/21/2022 2:15 PM EDT by Yoselin Ann      done    Medications    (X) Possible DVT prophylaxis    7/21/2022 2:15 PM EDT by Yoselin Ann      eliquis 5mg bid  asa 81 mg qd    * Milestone   Additional Notes   DATE: 7/20 imcu w tele- o2 3 lnc               Pertinent Updates: This morning patient was very alert, awake and talkative in a louder voice than her usual whisper. Her throat is a bit sore from the EGD  She claims her legs feel better and her abdomen is getting less swollen. Vitals:97.5 (36.4) 20 81 106/67 - Semi fowlers - 3 100 Nasal           Abnl/Pertinent Labs/Radiology/Diagnostic Studies:   Bs 198   Chloride 96 mmol/L      CO2 38 mmol/L      Anion Gap 7 mmol/L       Pt 18.4 inr 1.7      WBC 6.1 E9/L      RBC 3.16 E12/L      Hemoglobin 8.7 g/dL      Hematocrit 29.5 %      MCV 93.4 fL      MCH 27.5 pg      MCHC 29.5 %      RDW 17.2 fL      Platelets 572 L2/D      MPV 10.9 fL      Neutrophils % 80.4 %      Immature Granulocytes % 0.7 %      Lymphocytes % 10.9 %             Physical Exam:Net -3135 ml   ·    · General Appearance: alert, appears stated age, and cooperative   · HEENT:  Head: Normocephalic, no lesions, without obvious abnormality. · Neck: no JVD   · Lung: clear to auscultation bilaterally   · Heart: regular rate and rhythm, S1, S2 normal, no murmur, click, rub or gallop   · Abdomen: soft, non-tender; bowel sounds normal; no masses,  no organomegaly. Abdomen less swollen but still large. No more tenderness to palpation in the epigastric region. · Extremities:  edema +2 left +1 right   · Musculokeletal: No joint swelling, no muscle tenderness.  ROM HF   Continue lasix gtt as recommended by nephrology. Monitor renal function. Noted plan to start acetazolamide per nephrology   Follow labs, and urine output    Hx of asthma   Plan:   Continue ipratropium-albuterol 1 ampule, inhalation Q4H WA   Hx of SERENA   Patient not using CPAP at home       Gastrointestinal   Oropharyngeal dysphasia   SLP eval -> minced and moist diet   Plan:   SLP reevaluation, may need to change diet       Renal   Metabolic alkalosis likely 2/2 diuresis vs SERENA   Normal bicarb on admission   Plan:   Start acetazolamide if bicarb increases        Endocrinology   Hx of type 2 DM w/ neuropathyOn lantus 10 and Novolog SS at home   Plan:   Insulin glargine 5 units SQ nightly and insulin lispro 0-6 units SQ 4X daily AC and HS       Infectious disease   Septic shock likely 2/2 CAP - resolved   Lactic acidosis - resolved   Patient finished her 10 day course of vanc + cefepime        Heme-Onc   Acute on chronic anemia    EGD (7/19/22) showed moderate diffuse gastritis and duodenitis with no bleeding   Previous EGD in 2015 showed mild gastritis   FOBT (+) on 7/16/22   Melena 7/15/22   Required 2nd transfusion of 1pRBC on 7/17 Hb 6.7 > 8.3 post and a previous transfusion on of 1 pRBC on 7/8   Plan:   Check serology for H. Pylori and treat if necessary.     Switch to oral PPI, continue carafate    Monitor for drop of Hb       Problems resolved during this admission:    · Acute encephalopathy multifactorial 2/2 septic shock, digoxin toxicity, uremia   · Acute hypoxic hypercapnic respiratory failure likely 2/2 to RIGHT pleural effusion, possible HAP vs acute decompensated HFpEF exacerbation (EF 70-75)   · Shock likely septic, HAP, digoxin toxicity   · HAGMA 2/2 lactic acidiosis (hypoperfusion, septic shock), uremia 2/2 SHANTANU   · Elevated troponin   · SHANTANU stage III on CKD   · Metabolic alkalosis likely contraction alkalosis            PT/OT: continue   DVT ppx: Eliquis   GI ppx: protonix 40mg PO BID and carafate           Next of Kin/ POA:   Daughter       Code Status:    FULL code      Medications:iv diamox 500 mg bid   iv diuril 500 mg bid   last dose iv protonix 40 mg    iv lasix 100 mg  20 ml hr infusion   Pulmicort neb bid   Glargine 5 u hs qd   Humalog ss qid   Duoneb q 4hrs   Tylenol x1         Orders:imcu w tele o2 nc 3 L   Qd wts qd labs   Dysphagia diet   Wound rn   Oximetry   Pt ot slp evals

## 2022-07-22 NOTE — PROGRESS NOTES
[address] [id number] [address]      Date:2022                                                  Patient Name: Jeannei Montalvo  MRN: [id number]  : 1950  Room: [address] [place], [place] [address]    Evaluating OT: EMERY Carson, OTR/L  # [id number]    Referring Provider:  Jesslyn Fleischer, MD, William Coffey MD  Specific Provider Orders:  Emilee [place] and Treat\"  22    Diagnosis: Shock ([address] [R57.9]  Uremia [N19]  SHANTANU (acute kidney injury) ([address] [N17.9]  Poisoning by digoxin, accidental or unintentional, initial encounter [T46.0X1A]  Altered mental status, unspecified altered mental status type [R41.82]  Acute pancreatitis, unspecified complication status, unspecified pancreatitis type [K85.90]    Pt was admitted w/ Altered Mental Status, Bradycardia, Hypotension. Recent Med Changes for Psych issues/R Toe Amputation (22). Pertinent Medical History:  Pt has a past medical history of A-fib ([address] Acute on chronic congestive heart failure ([address] Anxiety, Asthma, CAD (coronary artery disease), Cancer ([address] Chronic kidney disease, Depression, Diabetes mellitus ([address] H/O mammogram, Hx MRSA infection, Hyperlipidemia, Hypertension, Lateral epicondylitis, SERENA on CPAP, Parkinson's disease ([address] and Tubal ligation status. ,  has a past surgical history that includes Gallbladder surgery; Toe amputation; Cholecystectomy; Colonoscopy (7/29/15); Endoscopy, colon, diagnostic (7/19/15); Upper gastrointestinal endoscopy; lumbar laminectomy; Tonsillectomy; Breast lumpectomy; Breast reduction surgery; Cardiac catheterization (2014); Cardiac catheterization (2022); CT GUIDED CHEST TUBE (2022); and Upper gastrointestinal endoscopy (N/A, 2022).     Surgeries this admission: None   Intubated 22, Right Pigtail Chest Tube Placed posteriorly Right Lung 22, Extubated 22    Precautions: Fall Risk  Pure-Wick Catheter  Minced/Moist Diet/No Straw      Assessment of current deficits   [x] Functional mobility  [x]ADLs  [x] Strength               []Cognition   [x] Functional transfers   [x] IADLs         [x] Safety Awareness   [x]Endurance   [] Fine Coordination              [x] Balance      [] Vision/perception   []Sensation    []Gross Motor Coordination  [] ROM  [] Delirium                   [] Motor Control       OT PLAN OF CARE   OT POC based on physician orders, patient diagnosis and results of clinical assessment    Frequency/Duration 1-3 days/wk for 2 weeks PRN   Specific OT Treatment to include:     * Instruction/training on adapted ADL techniques and AE recommendations to increase functional independence within precautions       * Training on energy conservation strategies, correct breathing pattern and techniques to improve independence/tolerance for self-care routine  * Functional transfer/mobility training/DME recommendations for increased independence, safety, and fall prevention  * Patient/Family education to increase follow through with safety techniques and functional independence  * Recommendation of environmental modifications for increased safety with functional transfers/mobility and ADLs  * Cognitive retraining/development of therapeutic activities to improve problem solving, judgement, memory, and attention for increased safety/participation in ADL/IADL tasks  * Therapeutic exercise to improve motor endurance, ROM, and functional strength for ADLs/functional transfers  * Therapeutic activities to facilitate/challenge dynamic balance, stand tolerance for increased safety and independence with ADLs  * Therapeutic activities to facilitate gross/fine motor skills for increased independence with ADLs  * Neuro-muscular re-education: facilitation of righting/equilibrium reactions  * Positioning to improve skin integrity, interaction with environment and functional independence  * Delirium SUP     Functional Transfers Mod A of 2 for safety    Standing from EOB, mgmt of multiple lines  Pt ed for safety/hand placement   Mod A- sit<->stand  Cuing for hand placement and body mechanics SUP     Functional Mobility Mod A of 2 for safety w/ [employer]    Side-stepping very short distance along EOB  Pt ed for safety/improved safety awareness, walker safety   Per last tx   SUP     Balance Sitting:     Static:  Min A -> SUP    Dynamic:  Min A -> Close SUP w/ functional ax EOB     Standing:     Static:  Mod A of 2 w/ [employer]    Dynamic:  Mod A of 2 w/ functional ax/mobility w/ [employer]   Sitting:     Static:  Ind    Dynamic:  SUP    Standing:     Static:  Min A     Dynamic: N/T Sitting:     Static:  IND    Dynamic:  IND w/ functional ax    Standing:     Static:  SUP w/ AD PRN    Dynamic:  SUP w/ functional ax/mobility w/ AD PRN   Activity Tolerance Fair(-)    Able to tolerate sitting EOB > 20 mins  Able to tolerate standing ~ 3-4 mins   Poor     Good(-)   Visual/  Perceptual    Hearing: WNL   Glasses: No    WFL   Hearing Aids:  No               Comments: Upon arrival pt seated in bedside chair. Pt educated on techniques to increase independence and safety during ADL's, and functional transfers. At the end of the session, patient seated in bedside chair. Call light and phone within reach, all lines and tubes intact. Overall patient demonstrated decreased independence and safety during completion of ADL/functional transfer/mobility tasks. Pt would benefit from continued skilled OT to increase safety and independence with completion of ADL/IADL tasks for functional independence and quality of life. Pt and/or Family verbalized/demonstrated a Good understanding of education provided. Will Review PRN.       Time In: 10:55  Time Out: 11:05  Total Treatment Time: 10 minutes    Min Units   OT Eval Low 66649       OT Eval Medium 88850      OT Eval High 30004      OT Re-Eval [id number]       Therapeutic Ex 61730       Therapeutic Activities 79622  18  4   ADL/Self Care 06759       Orthotic Management 40039       Manual 73971     Neuro Re-Ed 96941       Non-Billable Time            Jaquan George

## 2022-07-23 LAB
ALBUMIN SERPL-MCNC: 3.6 G/DL (ref 3.5–5.2)
ALP BLD-CCNC: 80 U/L (ref 35–104)
ALT SERPL-CCNC: <5 U/L (ref 0–32)
ANION GAP SERPL CALCULATED.3IONS-SCNC: 11 MMOL/L (ref 7–16)
AST SERPL-CCNC: 7 U/L (ref 0–31)
BASOPHILS ABSOLUTE: 0.03 E9/L (ref 0–0.2)
BASOPHILS RELATIVE PERCENT: 0.8 % (ref 0–2)
BILIRUB SERPL-MCNC: 0.9 MG/DL (ref 0–1.2)
BUN BLDV-MCNC: 21 MG/DL (ref 6–23)
CALCIUM SERPL-MCNC: 9 MG/DL (ref 8.6–10.2)
CHLORIDE BLD-SCNC: 88 MMOL/L (ref 98–107)
CO2: 39 MMOL/L (ref 22–29)
CREAT SERPL-MCNC: 1.2 MG/DL (ref 0.5–1)
EOSINOPHILS ABSOLUTE: 0.06 E9/L (ref 0.05–0.5)
EOSINOPHILS RELATIVE PERCENT: 1.6 % (ref 0–6)
GFR AFRICAN AMERICAN: 53
GFR NON-AFRICAN AMERICAN: 44 ML/MIN/1.73
GLUCOSE BLD-MCNC: 126 MG/DL (ref 74–99)
HCT VFR BLD CALC: 29.3 % (ref 34–48)
HEMOGLOBIN: 8.8 G/DL (ref 11.5–15.5)
IMMATURE GRANULOCYTES #: 0.02 E9/L
IMMATURE GRANULOCYTES %: 0.5 % (ref 0–5)
LYMPHOCYTES ABSOLUTE: 0.79 E9/L (ref 1.5–4)
LYMPHOCYTES RELATIVE PERCENT: 20.9 % (ref 20–42)
MAGNESIUM: 2 MG/DL (ref 1.6–2.6)
MCH RBC QN AUTO: 27.8 PG (ref 26–35)
MCHC RBC AUTO-ENTMCNC: 30 % (ref 32–34.5)
MCV RBC AUTO: 92.4 FL (ref 80–99.9)
METER GLUCOSE: 148 MG/DL (ref 74–99)
METER GLUCOSE: 152 MG/DL (ref 74–99)
METER GLUCOSE: 154 MG/DL (ref 74–99)
METER GLUCOSE: 95 MG/DL (ref 74–99)
MONOCYTES ABSOLUTE: 0.35 E9/L (ref 0.1–0.95)
MONOCYTES RELATIVE PERCENT: 9.3 % (ref 2–12)
NEUTROPHILS ABSOLUTE: 2.53 E9/L (ref 1.8–7.3)
NEUTROPHILS RELATIVE PERCENT: 66.9 % (ref 43–80)
PDW BLD-RTO: 17.9 FL (ref 11.5–15)
PHOSPHORUS: 3.5 MG/DL (ref 2.5–4.5)
PLATELET # BLD: 210 E9/L (ref 130–450)
PMV BLD AUTO: 11 FL (ref 7–12)
POTASSIUM SERPL-SCNC: 4 MMOL/L (ref 3.5–5)
RBC # BLD: 3.17 E12/L (ref 3.5–5.5)
SODIUM BLD-SCNC: 138 MMOL/L (ref 132–146)
TOTAL PROTEIN: 5.9 G/DL (ref 6.4–8.3)
WBC # BLD: 3.8 E9/L (ref 4.5–11.5)

## 2022-07-23 PROCEDURE — 6370000000 HC RX 637 (ALT 250 FOR IP): Performed by: SURGERY

## 2022-07-23 PROCEDURE — 6370000000 HC RX 637 (ALT 250 FOR IP): Performed by: INTERNAL MEDICINE

## 2022-07-23 PROCEDURE — 99232 SBSQ HOSP IP/OBS MODERATE 35: CPT | Performed by: INTERNAL MEDICINE

## 2022-07-23 PROCEDURE — 85025 COMPLETE CBC W/AUTO DIFF WBC: CPT

## 2022-07-23 PROCEDURE — 6370000000 HC RX 637 (ALT 250 FOR IP): Performed by: STUDENT IN AN ORGANIZED HEALTH CARE EDUCATION/TRAINING PROGRAM

## 2022-07-23 PROCEDURE — S5553 INSULIN LONG ACTING 5 U: HCPCS | Performed by: STUDENT IN AN ORGANIZED HEALTH CARE EDUCATION/TRAINING PROGRAM

## 2022-07-23 PROCEDURE — 6360000002 HC RX W HCPCS: Performed by: INTERNAL MEDICINE

## 2022-07-23 PROCEDURE — 80053 COMPREHEN METABOLIC PANEL: CPT

## 2022-07-23 PROCEDURE — 82962 GLUCOSE BLOOD TEST: CPT

## 2022-07-23 PROCEDURE — 84100 ASSAY OF PHOSPHORUS: CPT

## 2022-07-23 PROCEDURE — 2700000000 HC OXYGEN THERAPY PER DAY

## 2022-07-23 PROCEDURE — 2060000000 HC ICU INTERMEDIATE R&B

## 2022-07-23 PROCEDURE — 2580000003 HC RX 258: Performed by: SURGERY

## 2022-07-23 PROCEDURE — 94640 AIRWAY INHALATION TREATMENT: CPT

## 2022-07-23 PROCEDURE — 6360000002 HC RX W HCPCS: Performed by: SURGERY

## 2022-07-23 PROCEDURE — 99231 SBSQ HOSP IP/OBS SF/LOW 25: CPT | Performed by: INTERNAL MEDICINE

## 2022-07-23 PROCEDURE — 83735 ASSAY OF MAGNESIUM: CPT

## 2022-07-23 PROCEDURE — 36415 COLL VENOUS BLD VENIPUNCTURE: CPT

## 2022-07-23 RX ORDER — MECOBALAMIN 5000 MCG
5 TABLET,DISINTEGRATING ORAL NIGHTLY
DISCHARGE
Start: 2022-07-23 | End: 2022-08-24 | Stop reason: DRUGHIGH

## 2022-07-23 RX ORDER — IPRATROPIUM BROMIDE AND ALBUTEROL SULFATE 2.5; .5 MG/3ML; MG/3ML
3 SOLUTION RESPIRATORY (INHALATION)
Qty: 360 ML | DISCHARGE
Start: 2022-07-23 | End: 2022-08-24 | Stop reason: DRUGHIGH

## 2022-07-23 RX ORDER — QUETIAPINE FUMARATE 25 MG/1
25 TABLET, FILM COATED ORAL 2 TIMES DAILY
Qty: 60 TABLET | Refills: 3 | DISCHARGE
Start: 2022-07-23 | End: 2022-09-08

## 2022-07-23 RX ORDER — METOPROLOL TARTRATE 50 MG/1
50 TABLET, FILM COATED ORAL 2 TIMES DAILY
Qty: 60 TABLET | Refills: 3 | DISCHARGE
Start: 2022-07-23 | End: 2022-07-24 | Stop reason: HOSPADM

## 2022-07-23 RX ORDER — BUDESONIDE 0.5 MG/2ML
0.5 INHALANT ORAL 2 TIMES DAILY
Qty: 60 EACH | Refills: 3 | DISCHARGE
Start: 2022-07-23 | End: 2022-09-08

## 2022-07-23 RX ORDER — CARBIDOPA AND LEVODOPA 25; 100 MG/1; MG/1
2 TABLET, EXTENDED RELEASE ORAL 3 TIMES DAILY
DISCHARGE
Start: 2022-07-23

## 2022-07-23 RX ORDER — INSULIN GLARGINE-YFGN 100 [IU]/ML
5 INJECTION, SOLUTION SUBCUTANEOUS NIGHTLY
DISCHARGE
Start: 2022-07-23 | End: 2022-09-08

## 2022-07-23 RX ORDER — AMIODARONE HYDROCHLORIDE 200 MG/1
200 TABLET ORAL DAILY
DISCHARGE
Start: 2022-07-24 | End: 2022-09-08

## 2022-07-23 RX ORDER — ACETAZOLAMIDE 500 MG/5ML
500 INJECTION, POWDER, LYOPHILIZED, FOR SOLUTION INTRAVENOUS ONCE
Status: COMPLETED | OUTPATIENT
Start: 2022-07-23 | End: 2022-07-23

## 2022-07-23 RX ORDER — INSULIN GLARGINE-YFGN 100 [IU]/ML
5 INJECTION, SOLUTION SUBCUTANEOUS NIGHTLY
Status: DISCONTINUED | OUTPATIENT
Start: 2022-07-23 | End: 2022-07-27 | Stop reason: HOSPADM

## 2022-07-23 RX ORDER — ROPINIROLE 1 MG/1
1 TABLET, FILM COATED ORAL 3 TIMES DAILY
Qty: 90 TABLET | Refills: 3 | DISCHARGE
Start: 2022-07-23

## 2022-07-23 RX ADMIN — IPRATROPIUM BROMIDE AND ALBUTEROL SULFATE 1 AMPULE: .5; 2.5 SOLUTION RESPIRATORY (INHALATION) at 19:34

## 2022-07-23 RX ADMIN — CARBIDOPA AND LEVODOPA 2 TABLET: 25; 100 TABLET, EXTENDED RELEASE ORAL at 20:38

## 2022-07-23 RX ADMIN — METOPROLOL TARTRATE 50 MG: 50 TABLET, FILM COATED ORAL at 20:38

## 2022-07-23 RX ADMIN — SUCRALFATE 1 G: 1 TABLET ORAL at 12:17

## 2022-07-23 RX ADMIN — ROPINIROLE HYDROCHLORIDE 1 MG: 1 TABLET, FILM COATED ORAL at 15:16

## 2022-07-23 RX ADMIN — SUCRALFATE 1 G: 1 TABLET ORAL at 00:41

## 2022-07-23 RX ADMIN — Medication 5 MG: at 20:39

## 2022-07-23 RX ADMIN — SUCRALFATE 1 G: 1 TABLET ORAL at 23:25

## 2022-07-23 RX ADMIN — INSULIN LISPRO 1 UNITS: 100 INJECTION, SOLUTION INTRAVENOUS; SUBCUTANEOUS at 18:17

## 2022-07-23 RX ADMIN — BUDESONIDE 500 MCG: 0.5 SUSPENSION RESPIRATORY (INHALATION) at 19:34

## 2022-07-23 RX ADMIN — BUDESONIDE 500 MCG: 0.5 SUSPENSION RESPIRATORY (INHALATION) at 07:44

## 2022-07-23 RX ADMIN — APIXABAN 5 MG: 5 TABLET, FILM COATED ORAL at 20:39

## 2022-07-23 RX ADMIN — CARBIDOPA AND LEVODOPA 2 TABLET: 25; 100 TABLET, EXTENDED RELEASE ORAL at 15:16

## 2022-07-23 RX ADMIN — CARBIDOPA AND LEVODOPA 2 TABLET: 25; 100 TABLET, EXTENDED RELEASE ORAL at 08:40

## 2022-07-23 RX ADMIN — ASPIRIN 81 MG CHEWABLE TABLET 81 MG: 81 TABLET CHEWABLE at 11:15

## 2022-07-23 RX ADMIN — PANTOPRAZOLE SODIUM 40 MG: 40 TABLET, DELAYED RELEASE ORAL at 05:57

## 2022-07-23 RX ADMIN — ACETAZOLAMIDE 500 MG: 500 INJECTION, POWDER, LYOPHILIZED, FOR SOLUTION INTRAVENOUS at 15:18

## 2022-07-23 RX ADMIN — ANORECTAL OINTMENT: 15.7; .44; 24; 20.6 OINTMENT TOPICAL at 20:40

## 2022-07-23 RX ADMIN — APIXABAN 5 MG: 5 TABLET, FILM COATED ORAL at 11:15

## 2022-07-23 RX ADMIN — SODIUM CHLORIDE, PRESERVATIVE FREE 10 ML: 5 INJECTION INTRAVENOUS at 20:39

## 2022-07-23 RX ADMIN — POLYETHYLENE GLYCOL 3350 17 G: 17 POWDER, FOR SOLUTION ORAL at 20:50

## 2022-07-23 RX ADMIN — INSULIN LISPRO 1 UNITS: 100 INJECTION, SOLUTION INTRAVENOUS; SUBCUTANEOUS at 20:41

## 2022-07-23 RX ADMIN — ANORECTAL OINTMENT: 15.7; .44; 24; 20.6 OINTMENT TOPICAL at 11:16

## 2022-07-23 RX ADMIN — SODIUM CHLORIDE, PRESERVATIVE FREE 10 ML: 5 INJECTION INTRAVENOUS at 11:15

## 2022-07-23 RX ADMIN — QUETIAPINE FUMARATE 25 MG: 25 TABLET ORAL at 11:15

## 2022-07-23 RX ADMIN — ROPINIROLE HYDROCHLORIDE 1 MG: 1 TABLET, FILM COATED ORAL at 08:40

## 2022-07-23 RX ADMIN — IPRATROPIUM BROMIDE AND ALBUTEROL SULFATE 1 AMPULE: .5; 2.5 SOLUTION RESPIRATORY (INHALATION) at 07:44

## 2022-07-23 RX ADMIN — QUETIAPINE FUMARATE 25 MG: 25 TABLET ORAL at 20:39

## 2022-07-23 RX ADMIN — ROPINIROLE HYDROCHLORIDE 1 MG: 1 TABLET, FILM COATED ORAL at 20:38

## 2022-07-23 RX ADMIN — IPRATROPIUM BROMIDE AND ALBUTEROL SULFATE 1 AMPULE: .5; 2.5 SOLUTION RESPIRATORY (INHALATION) at 15:32

## 2022-07-23 RX ADMIN — SUCRALFATE 1 G: 1 TABLET ORAL at 18:17

## 2022-07-23 RX ADMIN — INSULIN GLARGINE-YFGN 5 UNITS: 100 INJECTION, SOLUTION SUBCUTANEOUS at 20:41

## 2022-07-23 RX ADMIN — METOPROLOL TARTRATE 50 MG: 50 TABLET, FILM COATED ORAL at 11:15

## 2022-07-23 RX ADMIN — AMIODARONE HYDROCHLORIDE 200 MG: 200 TABLET ORAL at 11:14

## 2022-07-23 RX ADMIN — INSULIN LISPRO 1 UNITS: 100 INJECTION, SOLUTION INTRAVENOUS; SUBCUTANEOUS at 12:17

## 2022-07-23 RX ADMIN — PANTOPRAZOLE SODIUM 40 MG: 40 TABLET, DELAYED RELEASE ORAL at 15:17

## 2022-07-23 RX ADMIN — ATORVASTATIN CALCIUM 20 MG: 20 TABLET, FILM COATED ORAL at 11:15

## 2022-07-23 RX ADMIN — SUCRALFATE 1 G: 1 TABLET ORAL at 05:57

## 2022-07-23 NOTE — PROGRESS NOTES
[place]  Department of Internal Medicine  Division of Pulmonary, Critical Care and Sleep Medicine  Progress Note    Vicenta Benz DO, MPH, Carissa Ferguson, [address] [facility] Sonam CRABTREE MD      Patient: Bradley Nunez  MRN: [id number]  : 1950    Encounter Time: 2:06 PM     Date of Admission: 2022 12:25 PM    Primary Care Physician: No primary care provider on file. Reason for Consultation: Drain CT management     SUBJECTIVE:    Less dizzy  No CP    OBJECTIVE:     PHYSICAL EXAM:   VITALS:   Vitals:    22 0622 0800   BP:  111/66  119/75   Pulse: 69 70  68   Resp: 17 18  20   Temp:  97.9 °F (36.6 °C)  97.6 °F (36.4 °C)   TempSrc:  Oral  Oral   SpO2: 96% 97%  94%   Weight:   213 lb 12.8 oz (97 kg)    Height:          No intake or output data in the 24 hours ending 22 1406       CONSTITUTIONAL:    Alert  SKIN:     No rash,   HEENT:     EOMI, MMM, No thrush  NECK:    No bruits, No JVP appreciated  CV:      Sinus,  No murmur, No rubs, No gallops  PULMONARY:   Couse BS,  No Wheezing, No Rales, No Rhonchi      CT noted  ABDOMEN:     Soft, non-tender. BS normal. No R/R/G  EXT:    No deformities . No clubbing. Edema ower extremity edema, No venous stasis  PULSE:   Appears equal and palpable.   PSYCHIATRIC:  Seems appropriate, No acute psychosis  MS:    Gross weakness  NEUROLOGIC:   The clinical assessment is non-focal     DATA: IMAGING & TESTING:     LABORATORY TESTS:    CBC:   Lab Results   Component Value Date/Time    WBC 3.8 2022 05:20 AM    RBC 3.17 2022 05:20 AM    HGB 8.8 2022 05:20 AM    HCT 29.3 2022 05:20 AM    MCV 92.4 2022 05:20 AM    MCH 27.8 2022 05:20 AM    MCHC 30.0 2022 05:20 AM    RDW 17.9 2022 05:20 AM     2022 05:20 AM    MPV 11.0 2022 05:20 AM     CMP:    Lab Results   Component Value Date/Time     07/23/2022 05:20 AM    K 4.0 07/23/2022 05:20 AM    K 5.0 07/06/2022 02:41 AM    CL 88 07/23/2022 05:20 AM    CO2 39 07/23/2022 05:20 AM    BUN 21 07/23/2022 05:20 AM    CREATININE 1.2 07/23/2022 05:20 AM    GFRAA 53 07/23/2022 05:20 AM    LABGLOM 44 07/23/2022 05:20 AM    GLUCOSE 126 07/23/2022 05:20 AM    PROT 5.9 07/23/2022 05:20 AM    LABALBU 3.6 07/23/2022 05:20 AM    CALCIUM 9.0 07/23/2022 05:20 AM    BILITOT 0.9 07/23/2022 05:20 AM    ALKPHOS 80 07/23/2022 05:20 AM    AST 7 07/23/2022 05:20 AM    ALT <5 07/23/2022 05:20 AM     Calcium:    Lab Results   Component Value Date/Time    CALCIUM 9.0 07/23/2022 05:20 AM     Ionized Calcium:  No results found for: IONCA  Magnesium:    Lab Results   Component Value Date/Time    MG 2.0 07/23/2022 05:20 AM     Phosphorus:    Lab Results   Component Value Date/Time    PHOS 3.5 07/23/2022 05:20 AM     PT/INR:    Lab Results   Component Value Date/Time    PROTIME 20.9 07/21/2022 04:34 AM    INR 1.9 07/21/2022 04:34 AM        PRO-BNP:   Lab Results   Component Value Date    PROBNP 37,248 (H) 07/12/2022    PROBNP 41,368 (H) 07/06/2022      ABGs:   Lab Results   Component Value Date/Time    PH 7.452 07/11/2022 05:07 AM    PO2 133.6 07/11/2022 05:07 AM    PCO2 40.0 07/11/2022 05:07 AM     Hemoglobin A1C: No components found for: HGBA1C    IMAGING:  Imaging tests were completed and reviewed and discussed radiology and care team involved and reveals   Echo Complete    Result Date: 7/7/2022  Findings   Left Ventricle  Micro-bubble contrast injected to enhance left ventricular visualization. Normal left ventricular size and systolic function. Ejection fraction is visually estimated at 70-75%. Indeterminate diastolic function. No regional wall motion abnormalities seen. Mild left ventricular concentric hypertrophy noted. Right Ventricle  Moderately dilated right ventricle. Right ventricle global systolic function is reduced. TAPSE 1.1 cm.    Left Atrium  The left atrium is moderate-severely dilated. JUANY 48 ml/m2. The interatrial septum appears intact. Right Atrium  Mildly enlarged right atrium. Mitral Valve  Structurally normal mitral valve. No evidence of mitral valve stenosis. Physiologic mitral regurgitation. Tricuspid Valve  The tricuspid valve appears structurally normal.  Mild tricuspid regurgitation. RVSP is at least 60 mmHg. Aortic Valve  Individual aortic valve leaflets are not clearly visualized. No hemodynamically significant aortic stenosis is present. No evidence of aortic valve regurgitation. Pulmonic Valve  The pulmonic valve was not well visualized. No evidence of pulmonic valve stenosis. No evidence of any pulmonic regurgitation. Pericardial Effusion  Prominent pericardial fat pad. No definitive evidence of pericardial effusion. Aorta  Aortic root dimension within normal limits. Miscellaneous  Dilated Inferior Vena Cava. Conclusions   Summary  Technically difficult study - limited visualization. Micro-bubble contrast injected to enhance left ventricular visualization. Normal left ventricular size and systolic function. Ejection fraction is visually estimated at 70-75%. Indeterminate diastolic function. No regional wall motion abnormalities seen. Mild left ventricular concentric hypertrophy noted. Moderately dilated right ventricle with reduced function. Biatrial dilation. Mild tricuspid regurgitation. RVSP is at least 60 mmHg. Prominent pericardial fat pad. No definitive evidence of pericardial effusion. Result Date: 7/5/2022  FINDINGS: Lower Chest: See the CT report of the chest dated the same date for full details. Organs: The liver is grossly unremarkable. The spleen is unremarkable. Gallbladder is been surgically removed. The pancreas reveals some mild fatty replacement. Both adrenal glands are within normal limits. The kidneys are unremarkable in appearance. GI/Bowel: Stomach is within normal limits.   No mucosal abnormality. Stool seen scattered diffusely throughout the colon. There is no wall thickening. No mucosal abnormality or distension of the small bowel. Pelvis: Pelvic organs reveal mild wall thickening of the bladder with incomplete distension. The uterus is grossly unremarkable. Peritoneum/Retroperitoneum: There is no abdominal retroperitoneal lymphadenopathy. There is mild stranding identified in the root of the mesentery as well as surrounding the pancreas in which mild inflammation of the pancreas cannot be completely excluded. Correlation to clinical lab values is recommended. No pelvic adenopathy with moderate atherosclerotic disease seen within the abdominal aorta and iliac vessels. Free fluid identified throughout the upper abdomen. Extensive vascular calcifications seen within the mesenteric vessels as well as the abdominal aorta. Bones/Soft Tissues: The bony structures reveal extensive degenerative changes seen within the spine and pelvis. Severe degenerative changes seen within the sacroiliac joints bilaterally right greater than left some sclerosis on the right to suggest prior healed sacroiliitis. There is extensive anasarca seen within the subcutaneous tissues. Increasing fluid within the abdomen when compared to the prior study. The anasarca is also increased in appearance of the prior examination. Mild stranding seen around the mesentery which correlation to clinical lab values is recommended to exclude possible pancreatitis or volume overload. Overall the appearance of the pancreas itself is grossly unremarkable. There is only mild stranding seen throughout the mesenteric root. CT HEAD WO CONTRAST  Result Date: 7/5/2022  No acute intracranial abnormality or significant change in the overall appearance of the brain.  MRI would be useful if symptoms persist. RECOMMENDATIONS: Unavailable     CT CHEST WO CONTRAST    Result Date: 7/5/2022  FINDINGS: Mediastinum: The cardiac size is enlarged. There is a small pericardial effusion. Minimal probable gas in the right atrium is likely secondary to recent intravenous injection. The esophagus is normal.  No definite mediastinal masses or lymphadenopathy. Lungs/pleura: There is consolidation posterior right upper lobe and right middle lobe with linear density in the lingula and at the left posterior lung base. There is right lower lobe consolidation and moderate right pleural effusion. Upper Abdomen: There is a small volume of right upper quadrant ascites. There are surgical clips in the gallbladder fossa and right upper anterior abdominal wall. Soft Tissues/Bones: There is mild induration of the subcutaneous fat. There is moderate lower thoracic spondylosis with slight levoconvex curvature of the thoracic spine. No definite lytic or blastic osseous lesions. Bilateral lung infiltrates with greatest consolidation right lower lobe probably secondary to atelectasis and or pneumonia. Moderate right pleural effusion. Cardiomegaly and small pericardial effusion. The pericardial effusion is new. Small volume right upper abdominal ascites along with anasarca. This is new. CT GUIDED CHEST TUBE  Result Date: 7/8/2022  [facility] After obtaining informed consent, following the routine sterile prep and drape and after the administration of local anesthesia a needle was inserted into the right pleural space . Serous fluid was aspirated. Subsequently a guidewire was passed through the needle. Subsequently the needle was removed and over the guidewire the tract was dilated. Following dilatation a locking loop catheter was placed. Post procedure CT scan reveals the tube to be in good position. Specimen was sent to the laboratory. The patient tolerated the procedure well. There were no complications. Prior to the procedure a timeout occurred at  1542 hours. The patient received 9 second  of  fluoroscopy. The patient received local anesthesia.  The patient was monitored for 60 minutes by a registered nurse. Successful uncomplicated  right chest tube placement Recommendations: 1. Follow-up tube check in 2 weeks 2. Flush tube daily with 5 to 10 mL of sterile saline     MRI BRAIN WO CONTRAST  Result Date: 7/8/2022  FINDINGS: INTRACRANIAL STRUCTURES/VENTRICLES: There is no acute infarct. No mass effect, edema or hemorrhage is seen. Mild volume loss is seen in the cerebrum with mild chronic microvascular ischemic changes. No hydrocephalus or extra-axial fluid is seen. ORBITS: Prosthetic lenses are seen in the globes bilaterally. The orbits are otherwise grossly unremarkable. SINUSES: Mild mucosal thickening in the paranasal sinuses. There is pooling of secretions in the nasopharynx. Moderate bilateral mastoid effusions. BONES/SOFT TISSUES: The bone marrow signal intensity appears normal. The soft tissues demonstrate no acute abnormality. 1.  No acute intracranial abnormality. 2. No gross epileptogenic lesion is identified. 3. Bilateral mastoid effusions, which may be due to eustachian tube dysfunction or otomastoiditis. RECOMMENDATIONS: Unavailable     US DUP LOWER EXTREMITIES BILATERAL VENOUS  Within the visualized vessels there is no evidence for deep venous thrombosis       Assessment: 1. Acute hypoxic hypercapnic respiratory failure 2/2 mod R pleural effusion, possible HAP vs acute decompensated  HFpEF exacerbation (EF 70-75%)  2. Pulmonary hypertension likely Type II 2/2 acute decompensated diastolic heart failure, in the setting of Hx SERENA  3. Moderate R pleural effusion, likely 2/2 HAP (Klebsiella pneumoniae) vs acute decompensated heart failure. D4 CT, 250 cc output in 24 hrs  4. HAP (Klebsiella pneumoniae on resp culture, light growth). D1 cefepime  5. Elevated pro-BNP, multifactorial, likely acute decompensated HFpEF, septic shock. 6. Elevated troponin likely 2/2 demand ischemia  7.  Permanent atrial fibrillation, s/p DCCV (April and May 2022), now rate controlled. 8. SHANTANU Stage III on CKD multifactorial, pre-renal (hemodynamically mediated, DHN 2/2 diuretic use, poor oral intake); vs ATN 2/2 septic shock. 9. Hypernatremia likely 2/2 over diuresis  10. Metabolic alkalosis likely contraction alkalosis 2/2 diuresis, poor oral intake  11. Pancytopenia likely 2/2 BM suppression 2/2 chronic illness, meds (Zosyn)  12. Acute on chronic macrocytic anemia, multifactorial, likely 2/2 chronic disease, blood draws,  Less likely hemolysis (no schistocytes, haptoglobin wnl) s/p 1 unit PRBC 7/11, FOBT positive today, with pinkish tinge on chest tube output  13. Thrombocytopenia,likely 2/2 #11, less likely HIT (not exposed to heparin products), DIC, hemolysis ruled out (work-up negative)  14. Prolonged INR, ? HFP wnl, no overt bleeding, apixaban and aspirin on hold. Given Vit K yesterday for INR 3.2>2 today  15. Low grade fever, multifactorial, ? meds (Precedex?), CLABSI?, no new leukocytosis, or hemodynamic instability   16. Hx of asthma  17. Hx of SERENA. Not using CPAP at home  18. Hx of HTN. -160`s  19. Hx of CAD  20. Hx of Type 2 DM with neuropathy. On Lantus 10 and Novolog SS at home  21. Hx of depression/anxiety. 22. Hx of restless leg syndrome  23. Hx of Parkinson's disease.  On Sinemet and ropinirole at home, which was discontinued upon discharge at [address]:   [address] Ball DO DO, MPH, [facility]  Professor of Internal Medicine  Pulmonary, Critical Care and Sleep Medicine

## 2022-07-23 NOTE — PROGRESS NOTES
[facility]  Internal Medicine Residency Program  Progress Note - House Team     Patient:  Ed Kim 66 y.o. female MRN: [id number]     Date of Service: 7/23/2022     CC: altered mental status    Subjective     Overnight events:    > 186 > 95   Cr improving 1.3 > 1.2   Negative for orthostatic hypotension (104/60 > 111/58)    The patient is alert and happy. She's increasingly more talkative and regaining her voice. She denies any chest pain, SOB or abdominal pain. No pain at the chest tube removal site. Objective     Physical Exam:  Vitals: /75   Pulse 68   Temp 97.6 °F (36.4 °C) (Oral)   Resp 20   Ht 5' (1.524 m)   Wt 213 lb 12.8 oz (97 kg)   SpO2 94%   BMI 41.75 kg/m²     I & O - 24hr: No intake or output data in the 24 hours ending 07/23/22 1308     General Appearance: alert, appears stated age, and cooperative  HEENT:  Head: Normocephalic, no lesions, without obvious abnormality. Neck: no JVD  Lung: clear to auscultation bilaterally  Heart: regular rate and rhythm, S1, S2 normal, no murmur, click, rub or gallop  Abdomen: soft, non-tender; bowel sounds normal; no masses,  no organomegaly. The anasarca has been going down. Extremities:  extremities normal, atraumatic, no cyanosis or edema  Musculokeletal: No joint swelling, no muscle tenderness. ROM normal in all joints of extremities.    Neurologic: Mental status: Alert, oriented, thought content appropriate  Subject  Pertinent Information & Imaging Studies, Consults   genesis  CBC:   Lab Results   Component Value Date/Time    WBC 3.8 07/23/2022 05:20 AM    RBC 3.17 07/23/2022 05:20 AM    HGB 8.8 07/23/2022 05:20 AM    HCT 29.3 07/23/2022 05:20 AM    MCV 92.4 07/23/2022 05:20 AM    MCH 27.8 07/23/2022 05:20 AM    MCHC 30.0 07/23/2022 05:20 AM    RDW 17.9 07/23/2022 05:20 AM     07/23/2022 05:20 AM    MPV 11.0 07/23/2022 05:20 AM     CBC with Differential:    Lab Results   Component Value Date/Time    WBC 3.8 07/23/2022 05:20 AM    RBC 3.17 07/23/2022 05:20 AM    HGB 8.8 07/23/2022 05:20 AM    HCT 29.3 07/23/2022 05:20 AM     07/23/2022 05:20 AM    MCV 92.4 07/23/2022 05:20 AM    MCH 27.8 07/23/2022 05:20 AM    MCHC 30.0 07/23/2022 05:20 AM    RDW 17.9 07/23/2022 05:20 AM    NRBC 0.0 03/15/2019 09:10 AM    SEGSPCT 74 08/20/2013 10:45 AM    METASPCT 0.9 07/14/2022 05:31 AM    LYMPHOPCT 20.9 07/23/2022 05:20 AM    MONOPCT 9.3 07/23/2022 05:20 AM    MYELOPCT 2.6 07/14/2022 05:31 AM    EOSPCT 1 06/16/2017 12:00 AM    BASOPCT 0.8 07/23/2022 05:20 AM    MONOSABS 0.35 07/23/2022 05:20 AM    LYMPHSABS 0.79 07/23/2022 05:20 AM    EOSABS 0.06 07/23/2022 05:20 AM    BASOSABS 0.03 07/23/2022 05:20 AM     Hemoglobin/Hematocrit:    Lab Results   Component Value Date/Time    HGB 8.8 07/23/2022 05:20 AM    HCT 29.3 07/23/2022 05:20 AM     BUN/Creatinine:    Lab Results   Component Value Date/Time    BUN 21 07/23/2022 05:20 AM    CREATININE 1.2 07/23/2022 05:20 AM     Magnesium:    Lab Results   Component Value Date/Time    MG 2.0 07/23/2022 05:20 AM     Phosphorus:    Lab Results   Component Value Date/Time    PHOS 3.5 07/23/2022 05:20 AM       IMAGING:   Imaging Studies:    XR CHEST PORTABLE    Result Date: 7/22/2022  1. Mild basilar atelectasis. 2.  Diminished inspiratory effort and elevation the right hemidiaphragm similar to the prior examination. 3.  No significant pleural effusion is visible. 4.  No evidence of pneumothorax. Fluoroscopy modified barium swallow with video    Result Date: 7/21/2022  Impaired swallowing mechanism with moderate laryngeal penetration with thin liquid barium. No barium aspiration. Please see separate speech pathology report for full discussion of findings and recommendations.             Notable Cultures:      Blood cultures   Blood Culture, Routine   Date Value Ref Range Status   07/05/2022 5 Days no growth  Final     Respiratory cultures No results found for: RESPCULTURE   Gram Stain Result   Date Value Ref Range Status   07/20/2022 Refer to ordered Gram stain for results  Final     Urine   Creatinine Ur POCT   Date Value Ref Range Status   06/17/2019 50 mg/dl  Final     Urine Culture, Routine   Date Value Ref Range Status   07/05/2022 Growth not present  Final     Legionella No results found for: LABLEGI  C Diff PCR No results found for: CDIFPCR  Wound culture/abscess: No results for input(s): WNDABS in the last 72 hours. Tip culture:No results for input(s): CXCATHTIP in the last 72 hours. Antibiotic  Days  Day started                                  OXYGENATION: 3L nasal canula    DIET: purred foods         Resident's Assessment and Plan     Lenny Dorsey is a 66 y.o. female with  has a past medical history of A-fib ([facility] [address] Acute on chronic congestive heart failure ([facility] [address] Anxiety, Asthma, CAD (coronary artery disease), Cancer ([facility] [address] Chronic kidney disease, Depression, Diabetes mellitus ([facility] [address] H/O mammogram, Hx MRSA infection, Hyperlipidemia, Hypertension, Lateral epicondylitis, SERENA on CPAP, Parkinson's disease ([facility] [address] and Tubal ligation status. came here with CC   Chief Complaint   Patient presents with    Altered Mental Status     per ems family reports ams x 45 minutes, recent psych med changes and right toe amputation        SUMMARY OF HOSPITAL STAY: Lenny Dorsey is a 66 y.o. female admitted for acute hypoxic hypercapnic resp failure and AMS likely 2/2 pleural effusion, possible HAP and acute decompensated HFpEF exacerbation (EF 70-75%), and septic shock likely 2/2 pneumonia, hypernatremia and lactic acidosis. She was intubated and put in the ICU and extubated after improvement on 7/10/22. Patient has completed a 10 day course of cefepime 2000 mg IV q12hr and vancomycin for possible HAP. Patient had an episode of black diarrhea and was FOBT positive on 7/16/22.  Patient's Hb dropped to 6.7 which required a packed RBC on 7/17/22 which brought the Hb up to 8.3 GS performed EGS (7/19) showing moderate diffuse gastritis and duodenitis. Chest tube placed (7/8) to drain pleural effusion likely due to heart failure. Pleural fluid analysis indicates transudative effusion. Chest tube was removed (7/21). Patient has been practicing improving her swallowing with tongue protrusion exercises. Consults:   Critical Care  Pulmonology  Palliative care  Cardiology  Nephrology      Assessment and Plan:     Neurology  Acute encephalopathy - resolved  Intubated in ICU d/t AMS  Extubated on 7/10/22  Plan:  Hold home depakote d/t it's association with encephalopathy  Hx of Parkinson  Stable  Plan:  Continue home carbidopa-levodopa  Hx of restless leg syndrome  Patient complaining of worsening during night time  Plan:  Continue home ropinirole 1mg TID  Hx of depression and anxiety  Plan:  Continue home quetiapine     Cardiovascular  Hx of permanent A-fib  DCCV in April and May 2022, currently rate controlled  Plan:  Continue with metoprolol and amiodarone  Continue Eliquis  Hold digoxin d/t previus toxicity  Hx of HTN  Plan:  Continue metoprolol  Hx of CAD  Plan:  Continue ASA  Hx of HLD  Plan:  Continue atorvastatin 20mg  Anasarca 2/2 acute decompensated diastolic heart failure - improving  Hx of heart failure w/ preserved EF (70-75%, mild tricuspid regurg) last echo done 7/7/22  Did not use diuril yesterday   Plan:  Continue metoprolol  Per Nephro rec - continue Lasix drip (unless Cr rises), consider Diuril today, Acetazolamide BID     Pulmonology  Moderate RIGHT pleural effusion likely 2/2 acute decompensated HF vs ? HAP  Most recent CR on 7/15/22 shoed no sizable pleural effusion compared to the CXR on 7/13/22  Finished 10 day course of cefepime and vanc  Transudative pleural effusion on 7/8/22.  Repeat pleural effusion analysis showed similar findings 7/20/22, however same time serum LDH and protein were not obtained  Net output of -19L since admission, urinary catheter removed  Chest tube (placed 7/8) last output was 140ml. Per rec of pulmonology, chest tube removed (7/21)  CXR (7/22) - mild basilar atelectasis, no significant pleural effusion, no PTX, diminished insp effeort and elevation of RIGHT hemidiaphragm (same as before)  Plan:  Monitor resp status, wean O2 as able. Pulmonology following. Hx of asthma  Plan:  Continue ipratropium-albuterol 1 ampule, inhalation Q4H WA  Hx of SERENA  Patient not using CPAP at home     Gastrointestinal  Mild-Moderate Oropharyngeal dysphasia  SLP re-evaluation  Modified barium swallow showed impaired swallowing mechanism with moderate laryngeal penetration with thin liquid barium. No barium aspiration. Plan:  Changed diet to purred  Patient has been practicing improving her swallowing with tongue protrusion exercises.      Renal  Metabolic alkalosis likely 2/2 diuresis - improving   Normal bicarb on admission, currently 39  Plan:  Acetazolamide started  SHANTANU likely 2/2 diuresis - improving  Cr increased 1 > 1.3 > 1.2  Plan:  Per nephro rec - continue the diuretics but hold the diuril      Endocrinology  Hx of type 2 DM w/ neuropathyOn lantus 10 and Novolog SS at home  Patient was tried on 7 units glargine but had a BG of 95 so was switched back to 5 units  Plan:  Insulin glargine 5 units SQ nightly and insulin lispro 0-6 units SQ 4X daily AC and HS     Infectious disease  Septic shock likely 2/2 CAP - resolved  Lactic acidosis - resolved  Patient finished her 10 day course of vanc + cefepime      Heme-Onc  Acute on chronic anemia   EGD (7/19/22) showed moderate diffuse gastritis and duodenitis with no bleeding  Previous EGD in 2015 showed mild gastritis  FOBT (+) on 7/16/22  Melena 7/15/22  Required 2nd transfusion of 1pRBC on 7/17 Hb 6.7 > 8.3 post and a previous transfusion on of 1 pRBC on 7/8  H pylori negative   Plan:  Continue oral PPI, continue carafate   Monitor for drop of Hb     Problems resolved during this admission:   Acute encephalopathy multifactorial 2/2 septic shock, digoxin toxicity, uremia  Acute hypoxic hypercapnic respiratory failure likely 2/2 to RIGHT pleural effusion, possible HAP vs acute decompensated HFpEF exacerbation (EF 70-75)  Shock likely septic, HAP, digoxin toxicity  HAGMA 2/2 lactic acidiosis (hypoperfusion, septic shock), uremia 2/2 SHANTANU  Elevated troponin  SHANTANU stage III on CKD  Metabolic alkalosis likely contraction alkalosis     PT/OT: Continue  DVT ppx: Eliquis  GI ppx: Protonix 40mg PO BID and carafate, purred diet      Next of Kin/ POA:  Daughter    Code Status:   FULL code     Disposition: [facility], may need oxygen on discharge      Nakia Quiñoenz MD, PGY-1  Attending physician: Dr. Clarisse Saeed

## 2022-07-23 NOTE — PROGRESS NOTES
[facility] Roberto A. Alanda Rouse, MD Tessa Spatz, MD Faye Guarneri, MD .  Progress Note      Patient's Name: Mona Kinney  1:13 PM  7/23/2022    Subjective  7/7 : seen in her room intubated mechanically ventilated fio2 40 . LE edema 1-2+ . On levophed . UO ok over last 24 hours     7/8 pt not in her room when coming to see her . She is in IR lab. Case d/w RN     7/9 seen in her room d/w ICU resident   Still on some pressors actually BP low when seeing her   Has CT in place with good drainage . Good UO in response to lasix via genao cath . Fio2 40 PEEP 8       7/10 seen in ICU intubated mechanically ventilated fio2 40 PEEP 8   1-2+ edema in UE and LEs   No pressors  On precedex drip     7/11 : seen in ICU extubated earlier today . BP stable on HFNC    7/12 seen in her room ,extubated , on o2/nc . Chest tube in place . Fevers today and precedex drip put on hold .good UO over last 24 hours     7/13 : seen in her room on o2/nc BP stable clinically doing better still with CT along with genao and fecal system    7/14 sitting in chair comfortable on o2/nc BP stable LE edema dependent . 7/15 ; weak on o2/nc . 7/16: In better spirits today. Denies dyspnea on nasal cannula. Receiving breathing treatment. Ongoing fatigue, generalized weakness and debilitation. Good appetite. Continued marked swelling. Asks about eating potato chips, discussed why this would be a bad idea. 7/18: Orthopnea. Unable to lay flat for EGD. Occasional wheezing. No dyspnea at rest at 30 degrees on nasal cannula. Swelling in her thighs sacrum and abdominal pannus seem worse to her. Very little distal swelling. Hungry and thirsty. Ongoing fatigue and generalized weakness. No chest pain or palpitations    7/19: Sleepy, somnolent though easily  awakened. Thinks her swelling may be a little bit better. 7/20: Feeling better. No dyspnea at rest.  Abdominal pannus swelling seems to be a little better. No peripheral swelling.    7/21: Ongoing fatigue and generalized weakness. Denies dyspnea at rest on room air. No peripheral swelling, though still has substantial edema in her abdominal pannus, upper thighs sacrum and lower back. 7/22: Feeling little bit better. Denies new complaint. ROS unremarkable    7/23 : seen in her room , stable bp on RA , appear euvolemic     History of Present Ilness:      69-year old female with a past medical history of hypertension, A. fib, CAD, asthma, HFpEF, CKD, SERENA, hyperlipidemia, type II DM, anxiety/depression, Parkinson's disease, restless leg syndrome who presented in the ED for altered mental status. She recently had a right second toe amputation back last month at [facility].  She was discharged after 2 weeks. According to the patient's family, she was doing okay until few hours prior to admission, patient was noted to be acting different yesterday morning. She was noted to be drowsy    Nephrology asked to see for SHANTANU   I did see pt in ICU she is intubated mechanically ventilated . She is on levophed . She has some LE edema. Vent setting stable .    UO marginal .       Allergies:  Ciprofloxacin and Codeine    Current Medications:    insulin glargine-yfgn (SEMGLEE-YFGN) injection vial 5 Units, Nightly  pantoprazole (PROTONIX) tablet 40 mg, BID AC  apixaban (ELIQUIS) tablet 5 mg, BID  aspirin chewable tablet 81 mg, Daily  sucralfate (CARAFATE) tablet 1 g, 4 times per day  menthol-zinc oxide (CALMOSEPTINE) 0.44-20.6 % ointment, BID  0.9 % sodium chloride infusion, PRN  rOPINIRole (REQUIP) tablet 1 mg, TID  benzocaine-menthol (CEPACOL SORE THROAT) lozenge 1 lozenge, Q2H PRN  QUEtiapine (SEROQUEL) tablet 25 mg, BID  metoprolol tartrate (LOPRESSOR) tablet 50 mg, BID  melatonin disintegrating tablet 5 mg, Nightly  labetalol (NORMODYNE;TRANDATE) injection 10 mg, Q6H PRN  hydrALAZINE (APRESOLINE) injection 10 mg, Q6H PRN  amiodarone (CORDARONE) tablet 200 mg, Daily  insulin lispro (HUMALOG) injection vial 0-6 Units, 4x Daily AC & HS  carbidopa-levodopa (SINEMET CR)  MG per extended release tablet 2 tablet, TID  ondansetron (ZOFRAN) injection 4 mg, Q6H PRN  budesonide (PULMICORT) nebulizer suspension 500 mcg, BID  levalbuterol (XOPENEX) nebulization 0.63 mg, Q4H PRN  ipratropium-albuterol (DUONEB) nebulizer solution 1 ampule, Q4H WA  polyethylene glycol (GLYCOLAX) packet 17 g, BID  atorvastatin (LIPITOR) tablet 20 mg, Daily  sodium chloride flush 0.9 % injection 5-40 mL, 2 times per day  0.9 % sodium chloride infusion, PRN  acetaminophen (TYLENOL) tablet 650 mg, Q6H PRN   Or  acetaminophen (TYLENOL) suppository 650 mg, Q6H PRN  glucose chewable tablet 16 g, PRN  dextrose bolus 10% 125 mL, PRN   Or  dextrose bolus 10% 250 mL, PRN  glucagon (rDNA) injection 1 mg, PRN  dextrose 5 % solution, PRN      Review of Systems:   Unable to obtain due to clinical conditon . Physical exam:   Vital signs /75   Pulse 68   Temp 97.6 °F (36.4 °C) (Oral)   Resp 20   Ht 5' (1.524 m)   Wt 213 lb 12.8 oz (97 kg)   SpO2 94%   BMI 41.75 kg/m²   Gen : moderate distress  Head : at , nc   Neck : supple , no thyromegaly noted . Eyes : EOMI , PERRLA   CV : tachycardiac 1+ edema in LE   Lungs: good flow heard b/l   Abd : soft , NT , BS + , No Organomegaly appreciated . Skin : soft, dry . Extremities: 2+ pitting edema lower back sacrum and thighs and inferior abdominal pannus, though no swelling in her distal lower and upper extremities  Neuro : CN  II-XII grossly intact , no focal neurologic deficit . Psych : cooperative .      Data:   Labs:  CBC with Differential:    Lab Results   Component Value Date/Time    WBC 3.8 07/23/2022 05:20 AM    RBC 3.17 07/23/2022 05:20 AM    HGB 8.8 07/23/2022 05:20 AM    HCT 29.3 07/23/2022 05:20 AM     07/23/2022 05:20 AM    MCV 92.4 07/23/2022 05:20 AM    MCH 27.8 07/23/2022 05:20 AM    MCHC 30.0 07/23/2022 05:20 AM    RDW 17.9 found for: PERCENTFE  TIBC:    Lab Results   Component Value Date/Time    TIBC 152 07/09/2022 08:01 AM     FERRITIN:    Lab Results   Component Value Date/Time    FERRITIN 243 07/09/2022 08:01 AM        Imaging:  XR CHEST PORTABLE   Final Result   1. Mild basilar atelectasis. 2.  Diminished inspiratory effort and elevation the right hemidiaphragm   similar to the prior examination. 3.  No significant pleural effusion is visible. 4.  No evidence of pneumothorax. Fluoroscopy modified barium swallow with video   Final Result   Impaired swallowing mechanism with moderate laryngeal penetration with thin   liquid barium. No barium aspiration. Please see separate speech pathology report for full discussion of findings   and recommendations. XR CHEST PORTABLE   Final Result   No sizable pleural effusion. XR CHEST PORTABLE   Final Result   Mild subsegmental atelectasis in the left lower lobe. XR CHEST PORTABLE   Final Result   Grossly stable pulmonary opacities in the lower left lung, which may   represent atelectasis or pneumonia. XR CHEST PORTABLE   Final Result   1. Patchy right perihilar and left lower lobe airspace disease   2. Status post removal of the endotracheal tube and NG tube      3. There is no pneumothorax. XR CHEST PORTABLE   Final Result   Lines and tubes are stable with no pneumothorax on the right. Some   improvement seen in the region of left lung base in the interval.         XR CHEST PORTABLE   Final Result   The evaluation is limited due to low lung volumes. No interval change compared to prior exam.      Lines and tubes are in unchanged position. XR ABDOMEN FOR NG/OG/NE TUBE PLACEMENT   Final Result   Catheter is in the stomach. XR CHEST PORTABLE   Final Result   Catheter placed with significant right lung aeration improvement and no   pneumothorax         MRI BRAIN WO CONTRAST   Final Result   1.   No acute intracranial abnormality. 2. No gross epileptogenic lesion is identified. 3. Bilateral mastoid effusions, which may be due to eustachian tube   dysfunction or otomastoiditis. RECOMMENDATIONS:   Unavailable         CT GUIDED CHEST TUBE   Final Result   Successful uncomplicated  right chest tube placement      Recommendations:   1. Follow-up tube check in 2 weeks   2. Flush tube daily with 5 to 10 mL of sterile saline            XR CHEST PORTABLE   Final Result   1. There is no interval change in the bilateral perihilar and lower lobe   airspace disease more prominent within the right lung. 2. Moderate size right pleural effusion. 3. There is no pneumothorax. US RETROPERITONEAL COMPLETE   Final Result   1. Marked bilateral renal cortical thinning. Echogenic renal parenchyma. No hydronephrosis. 2.  A Diaz catheter appears to be decompressing the bladder. XR CHEST PORTABLE   Final Result   Increasing infiltrate throughout the right lung persistent left basilar   alveolar infiltrate is well. XR CHEST PORTABLE   Final Result   Adequately positioned right internal jugular central venous line. Otherwise,   no significant change compared to prior exam glued in lines and tubes. US DUP LOWER EXTREMITIES BILATERAL VENOUS   Final Result   Within the visualized vessels there is no evidence for deep venous   thrombosis               XR CHEST PORTABLE   Final Result   1. No significant change. Cardiomegaly with right pleural effusion. 2.  Support tubes remain in appropriate position. CT HEAD WO CONTRAST   Final Result   No acute intracranial abnormality or significant change in the overall   appearance of the brain. MRI would be useful if symptoms persist.      RECOMMENDATIONS:   Unavailable         XR ABDOMEN FOR NG/OG/NE TUBE PLACEMENT   Final Result   Catheter is in the proximal stomach.          XR CHEST PORTABLE   Final Result   Adequately positioned endotracheal tube. Nasogastric tube coursing below   diaphragm. Otherwise, stable exam.         CT ABDOMEN PELVIS WO CONTRAST Additional Contrast? None   Final Result   Increasing fluid within the abdomen when compared to the prior study. The   anasarca is also increased in appearance of the prior examination. Mild stranding seen around the mesentery which correlation to clinical lab   values is recommended to exclude possible pancreatitis or volume overload. Overall the appearance of the pancreas itself is grossly unremarkable. There   is only mild stranding seen throughout the mesenteric root. CT CHEST WO CONTRAST   Final Result   Bilateral lung infiltrates with greatest consolidation right lower lobe   probably secondary to atelectasis and or pneumonia. Moderate right pleural effusion. Cardiomegaly and small pericardial effusion. The pericardial effusion is new. Small volume right upper abdominal ascites along with anasarca. This is new. XR CHEST PORTABLE   Final Result   New opacity on the right which may relate to pleural effusion with adjacent   atelectasis and or infiltrate. Cardiomegaly and pulmonary vascular   congestion implying elevated left heart filling pressures. Assessment    1. Acute kidney injury non oligouric: improved   Baseline cr 0.9  Etiology of SHANTANU due to decrease effective circulating volume in setting of intravascular volume depletion as evident by her need for levophed and her urine lytes with high avidity for cl and Na . Basically bland urine sediment which make GN/vasculitis unlikely   High BUN in part due to steroids   LE edema noted though she is still on levophed    2. Encephalopathy metabolic multifactorial : Improved    3. Digoxin toxicity s/p digbind. Resolved    4. Septic shock 2/2 PNA : resolved    5. Right Pleural Effusion with catheter in place for drainage    6.   Edema, marked, multifactorial    SHANTANU resolved  Edema better   Bicarbonate down to 39 with diamox    Recommendations    Give another dose acetazolamide today   Bmp in am      Electronically signed by Key Hernandez MD on 7/23/2022

## 2022-07-23 NOTE — PROGRESS NOTES
Senior Resident Addendum:  I have seen and examined the patient with the intern. I have discussed the case, including pertinent history and exam findings with the intern.  I agree with the assessment, plan and orders as documented above with the following additions:     Days since admission: 17     Consults:   Critical care  Cardiology  [address] [place] [address]  Nephrology  Pulmonology     Assessment & Plan:  Normocytic anemia 2/2 Gastritis   CBC daily  Transfuse for Hb<7  Stable   EGD 7/19/22 showed gastritis and duodenitis  Oropharyngeal dysphagia  SLP eval after MBS: nectar thick liquids  Pt instructed on laryngeal elevation and tongue base retraction exercises by SLP  Acute hypoxic respiratory failure 2/2 pleural effusion  On 2L NC, has not previously required home O2  Pleural effusion, transudative, 2/2 HF exacerbation s/p chest tube insertion on 7/8/22  Chest tube removed 7/22/22  Pulmonology following  Monitor off lasix   Repeat pleural fluid studies still indicate transudative etiology  SHANTANU most likely 2/2 diuresis  Lasix stopped for increasing creatinine per nephrology recommendations   Hx of permanent afib  Continue eliquis 5mg BID  Keep digoxin held as had encephalopathy with digoxin toxicity this admission  Continue amiodarone  Continue metoprolol 50mg BID for rate control  Hx of HTN  Orthostatics checked yesterday were negative although standing BP could not be completed as pt cannot stand  Hx of Type 2 DM  Continue lantus 5u qhs, LDSS  Hx of depression/anxiety  On home seroquel  Hx of restless leg syndrome  Continue home ropinirole  Hx of Parkinson's disease  Continue home sinemet     Problems resolved during this admission:  Acute encephalopathy, multifactorial 2/2 shock, digoxin toxicity, uremia  Shock 2/2 sepsis  Acute hypoxic hypercapnic respiratory failure 2/2 pleural effusion and acute HFpEF decompensation  Acute pancreatitis  SHANTANU Stage III on CKD  Hyperkalemia  HAGMA 2/2 lactic acidosis, uremia  Elevated INR  GIB 2/2 Gastritis        PT/OT: [facility] 14/24  DVT ppx: eliquis 5mg BID  GI ppx: protonix 40mg BID    Disposition: pending precert to facility    Mira Rosas MD, PGY-2  7/23/2022  7:12 AM

## 2022-07-23 NOTE — DISCHARGE INSTR - COC
Continuity of Care Form    Patient Name: Bradley Nunez   :    MRN:  [id number]    Admit date:  2022  Discharge date:  22    Code Status Order: Full Code   Advance Directives:     Admitting Physician:  Anmol Joiner MD  PCP: No primary care provider on file. Discharging Nurse: [phone]  [address] Unit/Room#: [address] 12 Unit Phone Number: [phone]    Emergency Contact:   Extended Emergency Contact Information  Primary Emergency Contact: Glenroy Kiersten [address] Phone: [phone]  Mobile Phone: [phone]  Relation: Child   needed? No  Secondary Emergency Contact: Scooter Doe  Mobile Phone: [phone]  Relation: Child   needed?  No    Past Surgical History:  Past Surgical History:   Procedure Laterality Date    BREAST LUMPECTOMY      BREAST REDUCTION SURGERY      CARDIAC CATHETERIZATION  2014    Dr. Tiffany Leach  2022    Dr. Ebonie Milan  7/29/15    CT GUIDED CHEST TUBE  2022    CT GUIDED CHEST TUBE 2022 Clifton Sicard, MD [facility]    ENDOSCOPY, COLON, DIAGNOSTIC  7/19/15    GALLBLADDER SURGERY      LUMBAR LAMINECTOMY      TOE AMPUTATION      TONSILLECTOMY      UPPER GASTROINTESTINAL ENDOSCOPY      UPPER GASTROINTESTINAL ENDOSCOPY N/A 2022    EGD ESOPHAGOGASTRODUODENOSCOPY performed by Semaj Avila MD at [address] [place] [address]       Immunization History:   Immunization History   Administered Date(s) Administered    COVID-19, PFIZER GRAY top, DO NOT Dilute, (age 15 y+), IM, 30 mcg/0.3 mL 2022    Influenza Vaccine, unspecified formulation 10/15/2014    Influenza Virus Vaccine 10/31/2008, 10/05/2011    Influenza, High Dose (Fluzone 65 yrs and older) 2015, 2016, 2017, 2019    Influenza, Triv, inactivated, subunit, adjuvanted, IM (Fluad 65 yrs and older) 2019    Pneumococcal Conjugate 13-valent (Reyes Cardona) 05/27/2016    Pneumococcal Polysaccharide ([id number]) 12/21/2012, 01/29/2018    Td, unspecified formulation 03/11/2014    Tdap (Boostrix, Adacel) 09/30/2015    Zoster Live (Zostavax) 06/25/2013    Zoster Recombinant (Shingrix) 12/10/2019       Active Problems:  Patient Active Problem List   Diagnosis Code    Depression with anxiety F41.8    Osteoporosis M81.0    Asthma J45.909    Hyperlipidemia E78.5    Mitral regurgitation I34.0    Obstructive sleep apnea syndrome G47.33    Psoriasis L40.9    Diabetes mellitus ([facility]) E11.9    Parkinson's disease ([facility] [address] G20    Primary hypertension I10    Microalbuminuria R80.9    Morbid obesity ([facility]) E66.01    RLS (restless legs syndrome) G25.81    Generalized weakness R53.1    Inability to walk R26.2    Hypothyroidism E03.9    Chest pain R07.9    Acute asthma exacerbation J45.901    Asthma exacerbation, mild J45.901    New onset a-fib ([facility]) I48.91    Atrial fibrillation with rapid ventricular response ([facility]) I48.91    Acute on chronic congestive heart failure ([facility]) I50.9    Coronary artery disease involving native coronary artery of native heart without angina pectoris I25.10    Dysphagia R13.10    Hepatosplenomegaly R16.2    Heart failure ([facility]) X02.9    Acute diastolic (congestive) heart failure ([facility]) I50.31    Nonrheumatic tricuspid valve regurgitation I36.1    Tobacco abuse Z72.0    Recurrent syncope R55    Septic shock ([facility]) A41.9, R65.21    Seizure-like activity ([facility]) R56.9    SHANTANU (acute kidney injury) ([facility] [address] N17.9    Pancytopenia ([facility]) D61.818    Thrombocytopenia ([facility]) D69.6    Encephalopathy acute G93.40    Acute respiratory failure with hypoxia ([facility]) J96.01    Lactic acidosis E87.2    Delirium R41.0    Acute on chronic anemia D64.9    Pleural effusion on right J90       Isolation/Infection:   Isolation            No Isolation          Patient Infection Status       Infection Onset Added Last Indicated Last Indicated By Review Planned Expiration Resolved Resolved By    None active    Resolved    COVID-19 (Rule Out) 07/16/22 07/16/22 07/16/22 Respiratory Panel, Molecular, with COVID-19 (Restricted: peds pts or suitable admitted adults) (Ordered)   07/16/22 Rule-Out Test Resulted    COVID-19 (Rule Out) 07/05/22 07/05/22 07/06/22 Respiratory Panel, Molecular, with COVID-19 (Restricted: peds pts or suitable admitted adults) (Ordered)   07/06/22 Rule-Out Test Resulted    COVID-19 (Rule Out) 05/11/22 05/11/22 05/11/22 COVID-19 & Influenza Combo (Ordered)   05/11/22 Rule-Out Test Resulted    COVID-19 (Rule Out) 03/17/22 03/17/22 03/17/22 Respiratory Panel, Molecular, with COVID-19 (Restricted: peds pts or suitable admitted adults) (Ordered)   03/17/22 Rule-Out Test Resulted    COVID-19 (Rule Out) 03/16/22 03/16/22 03/16/22 COVID-19, Rapid (Ordered)   03/16/22 Rule-Out Test Resulted    C-diff Rule Out 01/10/22 01/10/22 01/10/22 C. difficile toxin Molecular (Ordered)   03/17/22 [place]    Canceled  Nuris Garcia RN 1/11/22 1300     COVID-19 (Rule Out) 01/09/22 01/09/22 01/09/22 COVID-19, Rapid (Ordered)   01/09/22 Rule-Out Test Resulted    MRSA 05/19/21 05/21/21 05/19/21 Culture, Wound   10/25/21 Rehana Carreon RN    COVID-19 (Rule Out) 02/04/21 02/04/21 02/04/21 COVID-19 (Ordered)   02/04/21 Rule-Out Test Resulted            Nurse Assessment:  Last Vital Signs: /68   Pulse 75   Temp 97.6 °F (36.4 °C) (Oral)   Resp 20   Ht 5' (1.524 m)   Wt 213 lb 12.8 oz (97 kg)   SpO2 94%   BMI 41.75 kg/m²     Last documented pain score (0-10 scale): Pain Level: 0  Last Weight:   Wt Readings from Last 1 Encounters:   07/23/22 213 lb 12.8 oz (97 kg)     Mental Status:  oriented and alert    IV Access:  - None    Nursing Mobility/ADLs:  Walking   Assisted  Transfer  Assisted  Bathing  Assisted  Dressing  Assisted  Toileting  Assisted  Feeding  Assisted  Med Admin  Assisted  Med Delivery   whole and prefers mixed with apple sauce    Wound Care Documentation and Therapy:  Incision Toe (Comment  which one) Right (Active)   Wound Image   07/12/22 0945   Dressing Status Other (Comment) 07/18/22 0213   Dressing/Treatment Open to air 07/18/22 0800   Closure Sutures 07/18/22 0800   Margins Approximated 07/18/22 0800   Drainage Amount None 07/18/22 0800   Odor None 07/18/22 0800   Nicole-incision Assessment Ecchymosis 07/18/22 0800   Number of days:         Elimination:  Continence: Bowel: No  Bladder: No  Urinary Catheter: None   Colostomy/Ileostomy/Ileal Conduit: No  [REMOVED] Fecal Management System 07/10/22-Stool Appearance: Watery  [REMOVED] Fecal Management System 07/10/22-Stool Color: Willadean Rower  [REMOVED] Fecal Management System 07/10/22-Stool Amount: Small    Date of Last BM: 7/24/2022  No intake or output data in the 24 hours ending 07/23/22 1452  I/O last 3 completed shifts: In: 847.3 [P.O.:540; I.V.:307.3]  Out: 725 [Urine:650; Chest Tube:75]    Safety Concerns: At Risk for Falls    Impairments/Disabilities:      None    Nutrition Therapy:  Current Nutrition Therapy:   - Oral Diet:  Carb Control 5 carbs/meal (2000kcals/day) and Dysphagia 2 mechanically altered    Routes of Feeding: Oral  Liquids: Thin Liquids  Daily Fluid Restriction: no  Last Modified Barium Swallow with Video (Video Swallowing Test): not done    Treatments at the Time of Hospital Discharge:   Respiratory Treatments: none  Oxygen Therapy:  is not on home oxygen therapy.   Ventilator:    - No ventilator support    Rehab Therapies: Physical Therapy and Occupational Therapy  Weight Bearing Status/Restrictions: No weight bearing restrictions  Other Medical Equipment (for information only, NOT a DME order):  wheelchair and walker  Other Treatments: none    Patient's personal belongings (please select all that are sent with patient):  None    RN SIGNATURE:  Electronically signed by Lyric Jeffrey RN on 7/27/22 at 1:53 PM EDT    CASE MANAGEMENT/SOCIAL WORK SECTION    Inpatient Status Date: ***    Readmission Risk Assessment Score:  Readmission Risk              Risk of Unplanned Readmission:  [id number]           Discharging to Facility/ Agency   Name: [facility]  Address: Carmine Blackburn. [address] [place] [address]  [address]  Phone:  [phone]  Fax:  [phone]    Dialysis Facility (if applicable)   Name:  Address:  Dialysis Schedule:  Phone:  Fax:    / signature: {Esignature:[id number]}Electronically signed by Valentino Oiler, LSW on 7/25/2022 at 10:26 AM      PHYSICIAN SECTION    Prognosis: Good    Condition at Discharge: Stable    Rehab Potential (if transferring to Rehab): Good    Recommended Labs or Other Treatments After Discharge:     Physician Certification: I certify the above information and transfer of Bradley Nunez  is necessary for the continuing treatment of the diagnosis listed and that she requires [place] for less 30 days.      Update Admission H&P: No change in H&P    PHYSICIAN SIGNATURE:  Electronically signed by Jacquie Levy MD on 7/23/22 at 2:52 PM EDT

## 2022-07-24 LAB
ALBUMIN SERPL-MCNC: 4 G/DL (ref 3.5–5.2)
ALP BLD-CCNC: 82 U/L (ref 35–104)
ALT SERPL-CCNC: <5 U/L (ref 0–32)
ANION GAP SERPL CALCULATED.3IONS-SCNC: 12 MMOL/L (ref 7–16)
AST SERPL-CCNC: 7 U/L (ref 0–31)
BASOPHILS ABSOLUTE: 0.04 E9/L (ref 0–0.2)
BASOPHILS RELATIVE PERCENT: 1.1 % (ref 0–2)
BILIRUB SERPL-MCNC: 0.8 MG/DL (ref 0–1.2)
BUN BLDV-MCNC: 21 MG/DL (ref 6–23)
CALCIUM SERPL-MCNC: 9 MG/DL (ref 8.6–10.2)
CHLORIDE BLD-SCNC: 89 MMOL/L (ref 98–107)
CO2: 37 MMOL/L (ref 22–29)
CREAT SERPL-MCNC: 1.1 MG/DL (ref 0.5–1)
EOSINOPHILS ABSOLUTE: 0.08 E9/L (ref 0.05–0.5)
EOSINOPHILS RELATIVE PERCENT: 2.1 % (ref 0–6)
GFR AFRICAN AMERICAN: 59
GFR NON-AFRICAN AMERICAN: 49 ML/MIN/1.73
GLUCOSE BLD-MCNC: 130 MG/DL (ref 74–99)
HCT VFR BLD CALC: 29.7 % (ref 34–48)
HEMOGLOBIN: 8.9 G/DL (ref 11.5–15.5)
IMMATURE GRANULOCYTES #: 0.01 E9/L
IMMATURE GRANULOCYTES %: 0.3 % (ref 0–5)
LYMPHOCYTES ABSOLUTE: 0.82 E9/L (ref 1.5–4)
LYMPHOCYTES RELATIVE PERCENT: 21.8 % (ref 20–42)
MAGNESIUM: 2 MG/DL (ref 1.6–2.6)
MCH RBC QN AUTO: 28.3 PG (ref 26–35)
MCHC RBC AUTO-ENTMCNC: 30 % (ref 32–34.5)
MCV RBC AUTO: 94.6 FL (ref 80–99.9)
METER GLUCOSE: 124 MG/DL (ref 74–99)
METER GLUCOSE: 157 MG/DL (ref 74–99)
METER GLUCOSE: 171 MG/DL (ref 74–99)
METER GLUCOSE: 204 MG/DL (ref 74–99)
MONOCYTES ABSOLUTE: 0.36 E9/L (ref 0.1–0.95)
MONOCYTES RELATIVE PERCENT: 9.6 % (ref 2–12)
NEUTROPHILS ABSOLUTE: 2.45 E9/L (ref 1.8–7.3)
NEUTROPHILS RELATIVE PERCENT: 65.1 % (ref 43–80)
PDW BLD-RTO: 17.9 FL (ref 11.5–15)
PHOSPHORUS: 3.6 MG/DL (ref 2.5–4.5)
PLATELET # BLD: 188 E9/L (ref 130–450)
PMV BLD AUTO: 10.9 FL (ref 7–12)
POTASSIUM SERPL-SCNC: 3.4 MMOL/L (ref 3.5–5)
RBC # BLD: 3.14 E12/L (ref 3.5–5.5)
SODIUM BLD-SCNC: 138 MMOL/L (ref 132–146)
TOTAL PROTEIN: 6 G/DL (ref 6.4–8.3)
WBC # BLD: 3.8 E9/L (ref 4.5–11.5)

## 2022-07-24 PROCEDURE — 6370000000 HC RX 637 (ALT 250 FOR IP): Performed by: SURGERY

## 2022-07-24 PROCEDURE — S5553 INSULIN LONG ACTING 5 U: HCPCS | Performed by: STUDENT IN AN ORGANIZED HEALTH CARE EDUCATION/TRAINING PROGRAM

## 2022-07-24 PROCEDURE — 6360000002 HC RX W HCPCS: Performed by: SURGERY

## 2022-07-24 PROCEDURE — 6360000002 HC RX W HCPCS: Performed by: STUDENT IN AN ORGANIZED HEALTH CARE EDUCATION/TRAINING PROGRAM

## 2022-07-24 PROCEDURE — 85025 COMPLETE CBC W/AUTO DIFF WBC: CPT

## 2022-07-24 PROCEDURE — 83735 ASSAY OF MAGNESIUM: CPT

## 2022-07-24 PROCEDURE — 84100 ASSAY OF PHOSPHORUS: CPT

## 2022-07-24 PROCEDURE — 99231 SBSQ HOSP IP/OBS SF/LOW 25: CPT | Performed by: INTERNAL MEDICINE

## 2022-07-24 PROCEDURE — 80053 COMPREHEN METABOLIC PANEL: CPT

## 2022-07-24 PROCEDURE — 6370000000 HC RX 637 (ALT 250 FOR IP): Performed by: INTERNAL MEDICINE

## 2022-07-24 PROCEDURE — 97530 THERAPEUTIC ACTIVITIES: CPT

## 2022-07-24 PROCEDURE — 99232 SBSQ HOSP IP/OBS MODERATE 35: CPT | Performed by: INTERNAL MEDICINE

## 2022-07-24 PROCEDURE — 2700000000 HC OXYGEN THERAPY PER DAY

## 2022-07-24 PROCEDURE — 82962 GLUCOSE BLOOD TEST: CPT

## 2022-07-24 PROCEDURE — 1200000000 HC SEMI PRIVATE

## 2022-07-24 PROCEDURE — 2580000003 HC RX 258: Performed by: SURGERY

## 2022-07-24 PROCEDURE — 94640 AIRWAY INHALATION TREATMENT: CPT

## 2022-07-24 PROCEDURE — 6370000000 HC RX 637 (ALT 250 FOR IP): Performed by: STUDENT IN AN ORGANIZED HEALTH CARE EDUCATION/TRAINING PROGRAM

## 2022-07-24 PROCEDURE — 36415 COLL VENOUS BLD VENIPUNCTURE: CPT

## 2022-07-24 PROCEDURE — 97535 SELF CARE MNGMENT TRAINING: CPT

## 2022-07-24 RX ORDER — POTASSIUM CHLORIDE 20 MEQ/1
40 TABLET, EXTENDED RELEASE ORAL ONCE
Status: DISCONTINUED | OUTPATIENT
Start: 2022-07-24 | End: 2022-07-24

## 2022-07-24 RX ORDER — POTASSIUM CHLORIDE 7.45 MG/ML
10 INJECTION INTRAVENOUS
Status: COMPLETED | OUTPATIENT
Start: 2022-07-24 | End: 2022-07-24

## 2022-07-24 RX ORDER — POTASSIUM CHLORIDE 7.45 MG/ML
10 INJECTION INTRAVENOUS
Status: DISCONTINUED | OUTPATIENT
Start: 2022-07-24 | End: 2022-07-24

## 2022-07-24 RX ADMIN — ROPINIROLE HYDROCHLORIDE 1 MG: 1 TABLET, FILM COATED ORAL at 14:08

## 2022-07-24 RX ADMIN — BUDESONIDE 500 MCG: 0.5 SUSPENSION RESPIRATORY (INHALATION) at 20:41

## 2022-07-24 RX ADMIN — IPRATROPIUM BROMIDE AND ALBUTEROL SULFATE 1 AMPULE: .5; 2.5 SOLUTION RESPIRATORY (INHALATION) at 15:35

## 2022-07-24 RX ADMIN — AMIODARONE HYDROCHLORIDE 200 MG: 200 TABLET ORAL at 09:33

## 2022-07-24 RX ADMIN — CARBIDOPA AND LEVODOPA 2 TABLET: 25; 100 TABLET, EXTENDED RELEASE ORAL at 09:31

## 2022-07-24 RX ADMIN — APIXABAN 5 MG: 5 TABLET, FILM COATED ORAL at 09:33

## 2022-07-24 RX ADMIN — ONDANSETRON 4 MG: 2 INJECTION INTRAMUSCULAR; INTRAVENOUS at 09:39

## 2022-07-24 RX ADMIN — SUCRALFATE 1 G: 1 TABLET ORAL at 17:15

## 2022-07-24 RX ADMIN — IPRATROPIUM BROMIDE AND ALBUTEROL SULFATE 1 AMPULE: .5; 2.5 SOLUTION RESPIRATORY (INHALATION) at 11:26

## 2022-07-24 RX ADMIN — SODIUM CHLORIDE, PRESERVATIVE FREE 10 ML: 5 INJECTION INTRAVENOUS at 09:33

## 2022-07-24 RX ADMIN — INSULIN LISPRO 2 UNITS: 100 INJECTION, SOLUTION INTRAVENOUS; SUBCUTANEOUS at 21:30

## 2022-07-24 RX ADMIN — SODIUM CHLORIDE: 9 INJECTION, SOLUTION INTRAVENOUS at 16:22

## 2022-07-24 RX ADMIN — IPRATROPIUM BROMIDE AND ALBUTEROL SULFATE 1 AMPULE: .5; 2.5 SOLUTION RESPIRATORY (INHALATION) at 07:36

## 2022-07-24 RX ADMIN — ROPINIROLE HYDROCHLORIDE 1 MG: 1 TABLET, FILM COATED ORAL at 21:28

## 2022-07-24 RX ADMIN — METOPROLOL TARTRATE 25 MG: 25 TABLET, FILM COATED ORAL at 09:32

## 2022-07-24 RX ADMIN — POTASSIUM CHLORIDE 10 MEQ: 7.46 INJECTION, SOLUTION INTRAVENOUS at 16:24

## 2022-07-24 RX ADMIN — ATORVASTATIN CALCIUM 20 MG: 20 TABLET, FILM COATED ORAL at 09:32

## 2022-07-24 RX ADMIN — ASPIRIN 81 MG CHEWABLE TABLET 81 MG: 81 TABLET CHEWABLE at 09:32

## 2022-07-24 RX ADMIN — ROPINIROLE HYDROCHLORIDE 1 MG: 1 TABLET, FILM COATED ORAL at 09:32

## 2022-07-24 RX ADMIN — IPRATROPIUM BROMIDE AND ALBUTEROL SULFATE 1 AMPULE: .5; 2.5 SOLUTION RESPIRATORY (INHALATION) at 20:41

## 2022-07-24 RX ADMIN — POTASSIUM CHLORIDE 10 MEQ: 7.46 INJECTION, SOLUTION INTRAVENOUS at 14:15

## 2022-07-24 RX ADMIN — CARBIDOPA AND LEVODOPA 2 TABLET: 25; 100 TABLET, EXTENDED RELEASE ORAL at 21:28

## 2022-07-24 RX ADMIN — PANTOPRAZOLE SODIUM 40 MG: 40 TABLET, DELAYED RELEASE ORAL at 14:08

## 2022-07-24 RX ADMIN — APIXABAN 5 MG: 5 TABLET, FILM COATED ORAL at 21:28

## 2022-07-24 RX ADMIN — INSULIN GLARGINE-YFGN 5 UNITS: 100 INJECTION, SOLUTION SUBCUTANEOUS at 21:30

## 2022-07-24 RX ADMIN — SUCRALFATE 1 G: 1 TABLET ORAL at 06:01

## 2022-07-24 RX ADMIN — QUETIAPINE FUMARATE 25 MG: 25 TABLET ORAL at 09:32

## 2022-07-24 RX ADMIN — INSULIN LISPRO 1 UNITS: 100 INJECTION, SOLUTION INTRAVENOUS; SUBCUTANEOUS at 12:16

## 2022-07-24 RX ADMIN — SUCRALFATE 1 G: 1 TABLET ORAL at 12:19

## 2022-07-24 RX ADMIN — Medication 5 MG: at 21:28

## 2022-07-24 RX ADMIN — CARBIDOPA AND LEVODOPA 2 TABLET: 25; 100 TABLET, EXTENDED RELEASE ORAL at 14:08

## 2022-07-24 RX ADMIN — PANTOPRAZOLE SODIUM 40 MG: 40 TABLET, DELAYED RELEASE ORAL at 06:01

## 2022-07-24 RX ADMIN — ANORECTAL OINTMENT: 15.7; .44; 24; 20.6 OINTMENT TOPICAL at 21:30

## 2022-07-24 RX ADMIN — SODIUM CHLORIDE, PRESERVATIVE FREE 10 ML: 5 INJECTION INTRAVENOUS at 21:29

## 2022-07-24 RX ADMIN — POLYETHYLENE GLYCOL 3350 17 G: 17 POWDER, FOR SOLUTION ORAL at 09:32

## 2022-07-24 RX ADMIN — INSULIN LISPRO 1 UNITS: 100 INJECTION, SOLUTION INTRAVENOUS; SUBCUTANEOUS at 17:15

## 2022-07-24 RX ADMIN — QUETIAPINE FUMARATE 25 MG: 25 TABLET ORAL at 21:29

## 2022-07-24 RX ADMIN — BUDESONIDE 500 MCG: 0.5 SUSPENSION RESPIRATORY (INHALATION) at 07:36

## 2022-07-24 RX ADMIN — ANORECTAL OINTMENT: 15.7; .44; 24; 20.6 OINTMENT TOPICAL at 09:38

## 2022-07-24 ASSESSMENT — PAIN SCALES - GENERAL
PAINLEVEL_OUTOF10: 0

## 2022-07-24 NOTE — PROGRESS NOTES
[facility] MD Tami Vaz MD Emilio Meyers, MD .  Progress Note      Patient's Name: Sam Cogan  12:55 PM  7/24/2022    Subjective  7/7 : seen in her room intubated mechanically ventilated fio2 40 . LE edema 1-2+ . On levophed . UO ok over last 24 hours     7/8 pt not in her room when coming to see her . She is in IR lab. Case d/w RN     7/9 seen in her room d/w ICU resident   Still on some pressors actually BP low when seeing her   Has CT in place with good drainage . Good UO in response to lasix via genao cath . Fio2 40 PEEP 8       7/10 seen in ICU intubated mechanically ventilated fio2 40 PEEP 8   1-2+ edema in UE and LEs   No pressors  On precedex drip     7/11 : seen in ICU extubated earlier today . BP stable on HFNC    7/12 seen in her room ,extubated , on o2/nc . Chest tube in place . Fevers today and precedex drip put on hold .good UO over last 24 hours     7/13 : seen in her room on o2/nc BP stable clinically doing better still with CT along with genao and fecal system    7/14 sitting in chair comfortable on o2/nc BP stable LE edema dependent . 7/15 ; weak on o2/nc . 7/16: In better spirits today. Denies dyspnea on nasal cannula. Receiving breathing treatment. Ongoing fatigue, generalized weakness and debilitation. Good appetite. Continued marked swelling. Asks about eating potato chips, discussed why this would be a bad idea. 7/18: Orthopnea. Unable to lay flat for EGD. Occasional wheezing. No dyspnea at rest at 30 degrees on nasal cannula. Swelling in her thighs sacrum and abdominal pannus seem worse to her. Very little distal swelling. Hungry and thirsty. Ongoing fatigue and generalized weakness. No chest pain or palpitations    7/19: Sleepy, somnolent though easily  awakened. Thinks her swelling may be a little bit better. 7/20: Feeling better.   No dyspnea at rest.  Abdominal pannus swelling seems to be a little (NORMODYNE;TRANDATE) injection 10 mg, Q6H PRN  hydrALAZINE (APRESOLINE) injection 10 mg, Q6H PRN  amiodarone (CORDARONE) tablet 200 mg, Daily  insulin lispro (HUMALOG) injection vial 0-6 Units, 4x Daily AC & HS  carbidopa-levodopa (SINEMET CR)  MG per extended release tablet 2 tablet, TID  ondansetron (ZOFRAN) injection 4 mg, Q6H PRN  budesonide (PULMICORT) nebulizer suspension 500 mcg, BID  levalbuterol (XOPENEX) nebulization 0.63 mg, Q4H PRN  ipratropium-albuterol (DUONEB) nebulizer solution 1 ampule, Q4H WA  polyethylene glycol (GLYCOLAX) packet 17 g, BID  atorvastatin (LIPITOR) tablet 20 mg, Daily  sodium chloride flush 0.9 % injection 5-40 mL, 2 times per day  0.9 % sodium chloride infusion, PRN  acetaminophen (TYLENOL) tablet 650 mg, Q6H PRN   Or  acetaminophen (TYLENOL) suppository 650 mg, Q6H PRN  glucose chewable tablet 16 g, PRN  dextrose bolus 10% 125 mL, PRN   Or  dextrose bolus 10% 250 mL, PRN  glucagon (rDNA) injection 1 mg, PRN  dextrose 5 % solution, PRN      Review of Systems:   Unable to obtain due to clinical conditon . Physical exam:   Vital signs /64   Pulse 75   Temp 97.4 °F (36.3 °C) (Oral)   Resp 18   Ht 5' (1.524 m)   Wt 213 lb 12.8 oz (97 kg)   SpO2 95%   BMI 41.75 kg/m²   Gen : moderate distress  Head : at , nc   Neck : supple , no thyromegaly noted . Eyes : EOMI , PERRLA   CV : tachycardiac 1+ edema in LE   Lungs: good flow heard b/l   Abd : soft , NT , BS + , No Organomegaly appreciated . Skin : soft, dry . Extremities: 2+ pitting edema lower back sacrum and thighs and inferior abdominal pannus, though no swelling in her distal lower and upper extremities  Neuro : CN  II-XII grossly intact , no focal neurologic deficit . Psych : cooperative .      Data:   Labs:  CBC with Differential:    Lab Results   Component Value Date/Time    WBC 3.8 07/24/2022 04:49 AM    RBC 3.14 07/24/2022 04:49 AM    HGB 8.9 07/24/2022 04:49 AM    HCT 29.7 07/24/2022 04:49 AM    PLT 188 07/24/2022 04:49 AM    MCV 94.6 07/24/2022 04:49 AM    MCH 28.3 07/24/2022 04:49 AM    MCHC 30.0 07/24/2022 04:49 AM    RDW 17.9 07/24/2022 04:49 AM    NRBC 0.0 03/15/2019 09:10 AM    SEGSPCT 74 08/20/2013 10:45 AM    METASPCT 0.9 07/14/2022 05:31 AM    LYMPHOPCT 21.8 07/24/2022 04:49 AM    MONOPCT 9.6 07/24/2022 04:49 AM    MYELOPCT 2.6 07/14/2022 05:31 AM    EOSPCT 1 06/16/2017 12:00 AM    BASOPCT 1.1 07/24/2022 04:49 AM    MONOSABS 0.36 07/24/2022 04:49 AM    LYMPHSABS 0.82 07/24/2022 04:49 AM    EOSABS 0.08 07/24/2022 04:49 AM    BASOSABS 0.04 07/24/2022 04:49 AM     CMP:    Lab Results   Component Value Date/Time     07/24/2022 04:49 AM    K 3.4 07/24/2022 04:49 AM    K 5.0 07/06/2022 02:41 AM    CL 89 07/24/2022 04:49 AM    CO2 37 07/24/2022 04:49 AM    BUN 21 07/24/2022 04:49 AM    CREATININE 1.1 07/24/2022 04:49 AM    GFRAA 59 07/24/2022 04:49 AM    LABGLOM 49 07/24/2022 04:49 AM    GLUCOSE 130 07/24/2022 04:49 AM    PROT 6.0 07/24/2022 04:49 AM    LABALBU 4.0 07/24/2022 04:49 AM    CALCIUM 9.0 07/24/2022 04:49 AM    BILITOT 0.8 07/24/2022 04:49 AM    ALKPHOS 82 07/24/2022 04:49 AM    AST 7 07/24/2022 04:49 AM    ALT <5 07/24/2022 04:49 AM     Ionized Calcium:  No results found for: IONCA  Magnesium:    Lab Results   Component Value Date/Time    MG 2.0 07/24/2022 04:49 AM     Phosphorus:    Lab Results   Component Value Date/Time    PHOS 3.6 07/24/2022 04:49 AM     U/A:    Lab Results   Component Value Date/Time    COLORU Yellow 07/05/2022 05:43 PM    PHUR 5.5 07/05/2022 05:43 PM    WBCUA 1-3 07/05/2022 05:43 PM    RBCUA NONE 07/05/2022 05:43 PM    RBCUA NONE 03/25/2013 09:00 PM    YEAST RARE 10/27/2015 07:18 PM    BACTERIA FEW 07/05/2022 05:43 PM    CLARITYU Clear 07/05/2022 05:43 PM    SPECGRAV 1.025 07/05/2022 05:43 PM    LEUKOCYTESUR Negative 07/05/2022 05:43 PM    UROBILINOGEN 0.2 07/05/2022 05:43 PM    BILIRUBINUR Negative 07/05/2022 05:43 PM    BLOODU Negative 07/05/2022 05:43 PM    GLUCOSEU Negative 07/05/2022 05:43 PM     Microalbumen/Creatinine ratio:  No components found for: RUCREAT  Iron Saturation:  No components found for: PERCENTFE  TIBC:    Lab Results   Component Value Date/Time    TIBC 152 07/09/2022 08:01 AM     FERRITIN:    Lab Results   Component Value Date/Time    FERRITIN 243 07/09/2022 08:01 AM        Imaging:  XR CHEST PORTABLE   Final Result   1. Mild basilar atelectasis. 2.  Diminished inspiratory effort and elevation the right hemidiaphragm   similar to the prior examination. 3.  No significant pleural effusion is visible. 4.  No evidence of pneumothorax. Fluoroscopy modified barium swallow with video   Final Result   Impaired swallowing mechanism with moderate laryngeal penetration with thin   liquid barium. No barium aspiration. Please see separate speech pathology report for full discussion of findings   and recommendations. XR CHEST PORTABLE   Final Result   No sizable pleural effusion. XR CHEST PORTABLE   Final Result   Mild subsegmental atelectasis in the left lower lobe. XR CHEST PORTABLE   Final Result   Grossly stable pulmonary opacities in the lower left lung, which may   represent atelectasis or pneumonia. XR CHEST PORTABLE   Final Result   1. Patchy right perihilar and left lower lobe airspace disease   2. Status post removal of the endotracheal tube and NG tube      3. There is no pneumothorax. XR CHEST PORTABLE   Final Result   Lines and tubes are stable with no pneumothorax on the right. Some   improvement seen in the region of left lung base in the interval.         XR CHEST PORTABLE   Final Result   The evaluation is limited due to low lung volumes. No interval change compared to prior exam.      Lines and tubes are in unchanged position. XR ABDOMEN FOR NG/OG/NE TUBE PLACEMENT   Final Result   Catheter is in the stomach.          XR CHEST PORTABLE   Final Result   Catheter placed with significant right lung aeration improvement and no   pneumothorax         MRI BRAIN WO CONTRAST   Final Result   1. No acute intracranial abnormality. 2. No gross epileptogenic lesion is identified. 3. Bilateral mastoid effusions, which may be due to eustachian tube   dysfunction or otomastoiditis. RECOMMENDATIONS:   Unavailable         CT GUIDED CHEST TUBE   Final Result   Successful uncomplicated  right chest tube placement      Recommendations:   1. Follow-up tube check in 2 weeks   2. Flush tube daily with 5 to 10 mL of sterile saline            XR CHEST PORTABLE   Final Result   1. There is no interval change in the bilateral perihilar and lower lobe   airspace disease more prominent within the right lung. 2. Moderate size right pleural effusion. 3. There is no pneumothorax. US RETROPERITONEAL COMPLETE   Final Result   1. Marked bilateral renal cortical thinning. Echogenic renal parenchyma. No hydronephrosis. 2.  A Diaz catheter appears to be decompressing the bladder. XR CHEST PORTABLE   Final Result   Increasing infiltrate throughout the right lung persistent left basilar   alveolar infiltrate is well. XR CHEST PORTABLE   Final Result   Adequately positioned right internal jugular central venous line. Otherwise,   no significant change compared to prior exam glued in lines and tubes. US DUP LOWER EXTREMITIES BILATERAL VENOUS   Final Result   Within the visualized vessels there is no evidence for deep venous   thrombosis               XR CHEST PORTABLE   Final Result   1. No significant change. Cardiomegaly with right pleural effusion. 2.  Support tubes remain in appropriate position. CT HEAD WO CONTRAST   Final Result   No acute intracranial abnormality or significant change in the overall   appearance of the brain.  MRI would be useful if symptoms persist.      RECOMMENDATIONS:   Unavailable         XR ABDOMEN FOR NG/OG/NE TUBE PLACEMENT   Final Result   Catheter is in the proximal stomach. XR CHEST PORTABLE   Final Result   Adequately positioned endotracheal tube. Nasogastric tube coursing below   diaphragm. Otherwise, stable exam.         CT ABDOMEN PELVIS WO CONTRAST Additional Contrast? None   Final Result   Increasing fluid within the abdomen when compared to the prior study. The   anasarca is also increased in appearance of the prior examination. Mild stranding seen around the mesentery which correlation to clinical lab   values is recommended to exclude possible pancreatitis or volume overload. Overall the appearance of the pancreas itself is grossly unremarkable. There   is only mild stranding seen throughout the mesenteric root. CT CHEST WO CONTRAST   Final Result   Bilateral lung infiltrates with greatest consolidation right lower lobe   probably secondary to atelectasis and or pneumonia. Moderate right pleural effusion. Cardiomegaly and small pericardial effusion. The pericardial effusion is new. Small volume right upper abdominal ascites along with anasarca. This is new. XR CHEST PORTABLE   Final Result   New opacity on the right which may relate to pleural effusion with adjacent   atelectasis and or infiltrate. Cardiomegaly and pulmonary vascular   congestion implying elevated left heart filling pressures. Assessment    1. Acute kidney injury non oligouric: improved   Baseline cr 0.9  Etiology of SHANTANU due to decrease effective circulating volume in setting of intravascular volume depletion as evident by her need for levophed and her urine lytes with high avidity for cl and Na . Basically bland urine sediment which make GN/vasculitis unlikely   High BUN in part due to steroids   LE edema noted though she is still on levophed    2. Encephalopathy metabolic multifactorial : Improved    3. Digoxin toxicity s/p digbind. Resolved    4.   Septic shock 2/2 PNA :

## 2022-07-24 NOTE — PROGRESS NOTES
Natasha Brothers [facility]  Internal Medicine Residency / [address]    Attending Physician Statement  I have discussed the case, including pertinent history and exam findings with the resident and the team.  I have seen and examined the patient and the key elements of the encounter have been performed by me. I have also personally reviewed imaging studies and labs. I agree with the assessment, plan and orders as documented by the resident. Continues to clinically improve. Remains on minimal oxygen per NC. She wants to try regular diet now, discussed the latest swallow studies and importance of keeping current diet to avoid aspiration. Clear breath sounds. Cardiac rate normal, in sinus. Significantly decreased anasarca. UO remains clear, unsure if properly recorded yesterday (only 200cc in 24 hours?). Assessment:  SHANTANU, metabolic alkalosis may be from diuresis,improving. Almost back to baseline today. Acute hypoxic respiratory failure 2/2 to CAP, transudative pleural effusion likely from HFpEF - stable on minimal oxygen per NC. S/p chest tube insertion, R - removed 7/22/22. Acute on chronic anemia. Gastritis and duodenitis seen on EGD (7/19/22), no active bleeding and Hgb has been stable. H pylori negative. Mild to moderate oropharyngeal dysphagia still seen on MBS (7/20/22)  HTN, low BP this am.   RLS, controlled with current management  AF, currently in sinus. On BB and amiodarone. DM, insulin-requiring. Improved BS after lantus was decreased. Septic shock, resolved. Abx course completed. Acute encephalopathy, resolved        Plan:  Decrease BB  Continue current insulin regimen  Further diuresis and continuation of acetazolamide as recommended by Nephrology service  Continue ASA and eliquis.  Monitor for drop of  hgb, recurrence of bleeding  Continue PPI, carafate  Continue holding digoxin, depakote  May need oxygen on discharge  No change in diet  Appreciate care from all

## 2022-07-24 NOTE — PLAN OF CARE
Problem: Chronic Conditions and Co-morbidities  Goal: Patient's chronic conditions and co-morbidity symptoms are monitored and maintained or improved  Outcome: Progressing  Flowsheets (Taken 7/23/2022 2000)  Care Plan - Patient's Chronic Conditions and Co-Morbidity Symptoms are Monitored and Maintained or Improved: Monitor and assess patient's chronic conditions and comorbid symptoms for stability, deterioration, or improvement     Problem: Discharge Planning  Goal: Discharge to home or other facility with appropriate resources  Outcome: Progressing  Flowsheets (Taken 7/23/2022 2000)  Discharge to home or other facility with appropriate resources: Identify barriers to discharge with patient and caregiver     Problem: Pain  Goal: Verbalizes/displays adequate comfort level or baseline comfort level  Outcome: Progressing     Problem: Skin/Tissue Integrity  Goal: Absence of new skin breakdown  Description: 1. Monitor for areas of redness and/or skin breakdown  2. Assess vascular access sites hourly  3. Every 4-6 hours minimum:  Change oxygen saturation probe site  4. Every 4-6 hours:  If on nasal continuous positive airway pressure, respiratory therapy assess nares and determine need for appliance change or resting period.   Outcome: Progressing     Problem: ABCDS Injury Assessment  Goal: Absence of physical injury  Outcome: Progressing     Problem: Safety - Adult  Goal: Free from fall injury  Outcome: Progressing     Problem: Respiratory - Adult  Goal: Achieves optimal ventilation and oxygenation  Outcome: Progressing     Problem: Nutrition Deficit:  Goal: Optimize nutritional status  Outcome: Progressing     Problem: Cardiovascular - Adult  Goal: Maintains optimal cardiac output and hemodynamic stability  Outcome: Progressing     Problem: Metabolic/Fluid and Electrolytes - Adult  Goal: Hemodynamic stability and optimal renal function maintained  Outcome: Progressing  Flowsheets (Taken 7/23/2022 2000)  Hemodynamic stability and optimal renal function maintained:   Monitor labs and assess for signs and symptoms of volume excess or deficit   Monitor intake, output and patient weight     Problem: Neurosensory - Adult  Goal: Absence of seizures  Outcome: Progressing     Problem: Skin/Tissue Integrity - Adult  Goal: Skin integrity remains intact  Outcome: Progressing  Flowsheets (Taken 7/23/2022 2000)  Skin Integrity Remains Intact:   Monitor for areas of redness and/or skin breakdown   Assess vascular access sites hourly     Problem: Musculoskeletal - Adult  Goal: Return mobility to safest level of function  Outcome: Progressing     Problem: Genitourinary - Adult  Goal: Absence of urinary retention  Outcome: Progressing     Problem: Infection - Adult  Goal: Absence of infection during hospitalization  Outcome: Progressing  Flowsheets (Taken 7/23/2022 2000)  Absence of infection during hospitalization:   Assess and monitor for signs and symptoms of infection   Monitor lab/diagnostic results

## 2022-07-24 NOTE — PROGRESS NOTES
Physical Therapy  Treatment Note    Name: Jeannie Montalvo  : 1950  MRN: [id number]      Date of Service: 2022    Evaluating PT:  Luz Maria Islas PT, DPT [id number]    Room #:  7424/2086-D  Diagnosis:  Shock ([facility] [address] [R57.9]  Uremia [N19]  SHANTANU (acute kidney injury) ([facility] [address] [N17.9]  Poisoning by digoxin, accidental or unintentional, initial encounter [T46.0X1A]  Altered mental status, unspecified altered mental status type [R41.82]  Acute pancreatitis, unspecified complication status, unspecified pancreatitis type [K85.90]  PMHx/PSHx:  CHF, a fib, anxiety, asthma, CAD, CKD, DM, HLD, HTN, SERENA, parkinson's disease, tubal ligation status  Procedure/Surgery:  Extubated 7/10  Precautions:  Falls, , O2, Equipment Needs:  TBD    SUBJECTIVE:    Pt lives alone in an apartment with level entry and elevator access. Bed is on 1st floor and bath is on 1st floor. Pt ambulated with rollator PTA. Pt reports having conversation with son about transitioning to assisted living, but has not yet been able to. OBJECTIVE:   Initial Evaluation  Date: 22 Treatment  Date:22 Short Term/ Long Term   Goals   AM-PAC 6 Clicks  89/35    Was pt agreeable to Eval/treatment? yes yes    Does pt have pain?  L hip pain during mobility No c/o pain    Bed Mobility  Rolling: MaxA x2  Supine to sit: MaxA x2  Sit to supine: maxA x2  Scooting: MaxA x2 Rolling: NT  Supine to sit: NT  Sit to supine: NT  Scooting: SBA Rolling: Shell  Supine to sit: Shell  Sit to supine: Shell  Scooting: Shell   Transfers Sit to stand: Shell x2  Stand to sit: Shell x2  Stand pivot: NT Sit to stand: ModA  Stand to sit: Shell  Stand pivot: Shell with [employer] Sit to stand: SBA  Stand to sit: SBA  Stand pivot: SBA with [employer]   Ambulation    NT, unable 10' Shell WW 50 feet with [employer] SBA   Stair negotiation: ascended and descended  NT NT TBD   ROM BUE:  Defer to OT note  BLE:  WFL     Strength BUE:  Defer to OT note  BLE:  B hip flex 3/5, B knee ext 3+/5, B dorsiflex 3+/5  [facility] Balance Sitting EOB:  SBA<>Shell  Dynamic Standing:  Shell x2 with [employer] Sitting EOB:  SBA  Dynamic Standing:  Shell with [employer] Sitting EOB:  Independent   Dynamic Standing:  SBA with [employer]   Therapeutic Exercises:    Sit<>stand x3 with Shell  Stand pivot transfer x2 with standard walker with Shell  Ambulation: 2x10 feet with standard walker with Shell    Patient education  Pt educated on safety with mobility, reliance on walker for balance when legs feel weak. Patient response to education:   Pt verbalized understanding Pt demonstrated skill Pt requires further education in this area   Yes Yes Reinforce     ASSESSMENT:    Comments: The pt was able to stand from her hospital bed easily, required assistance when standing from the bedside chair. The pt was able to repeat multiple reps of sit<>stand but ambulating. The pt did report increased BLE weakness and occasional \"buckling\" sensation, no overt LOB noted with ambulation and the pt was able to return to her chair, however she was noted to have episodes of unsteadiness that were thought to be from the \"buckling\" sensation the pt reported in the pt's BLE. Treatment:  Patient practiced and was instructed in the following treatment:    Transfer training: verbal cues for hand placement during sit to stand transfer and assistance for anterior weight shift in order to facilitate initiation of the stand. Gait training: verbal cues for sequencing and safety, verbal cues for appropriate step length and positioning within walker with dynamic activities, physical assist for walker management, and steadying     PLAN:    Patient is making good progress towards established goals. Will continue with current POC.       Time in  1306  Time out  1325    Total Treatment Time  19 minutes     CPT codes:  [] Gait training 65830 0 minutes  [] Manual therapy 91783 0 minutes  [x] Therapeutic activities 95379 19 minutes  [] Therapeutic exercises 21921 0 minutes  [] Neuromuscular reeducation 89077 0 minutes    Lucia [place].  Arin Keller, [address]  number:  PT [id number]

## 2022-07-24 NOTE — PROGRESS NOTES
Senior Resident Addendum:  I have seen and examined the patient with the intern. I have discussed the case, including pertinent history and exam findings with the intern.  I agree with the assessment, plan and orders as documented above with the following additions:     Days since admission: 18     Consults:   Critical care  Cardiology  [address] [place] [address] care  Nephrology  Pulmonology     Assessment & Plan:  Normocytic anemia 2/2 Gastritis   CBC daily  Transfuse for Hb<7  Stable   EGD 7/19/22 showed gastritis and duodenitis  Oropharyngeal dysphagia  SLP eval after MBS: nectar thick liquids  Pt instructed on laryngeal elevation and tongue base retraction exercises by SLP  Acute hypoxic respiratory failure 2/2 pleural effusion  On 3L NC, has not previously required home O2  Pleural effusion, transudative, 2/2 HF exacerbation s/p chest tube insertion on 7/8/22  Chest tube removed 7/22/22  Pulmonology following  Monitor off lasix   Acetazolamide daily per neprho  SHANTANU most likely 2/2 diuresis  Lasix stopped for increasing creatinine per nephrology recommendations   Cr now downtrending   Hx of permanent afib  Continue eliquis 5mg BID  Keep digoxin held as had encephalopathy with digoxin toxicity this admission  Continue amiodarone  Decrease metoprolol to 25mg BID as BP has been borderline  Hx of HTN  Hx of Type 2 DM  Continue lantus 5u qhs, LDSS  BG at goal  Hx of depression/anxiety  On home seroquel  Hx of restless leg syndrome  Continue home ropinirole  Hx of Parkinson's disease  Continue home sinemet     Problems resolved during this admission:  Acute encephalopathy, multifactorial 2/2 shock, digoxin toxicity, uremia  Shock 2/2 sepsis  Acute hypoxic hypercapnic respiratory failure 2/2 pleural effusion and acute HFpEF decompensation  Acute pancreatitis  SHANTANU Stage III on CKD  Hyperkalemia  HAGMA 2/2 lactic acidosis, uremia  Elevated INR  GIB 2/2 Gastritis        PT/OT: AMPAC 14/24  DVT ppx: eliquis 5mg BID  GI ppx: protonix

## 2022-07-24 NOTE — PROGRESS NOTES
[facility]  Department of Internal Medicine  Division of Pulmonary, Critical Care and Sleep Medicine  Progress Note    Hawa Jurado DO, MPH, Rosie Doyle, [address] Jhon CRABTREE MD      Patient: Francis Dubon  MRN: [id number]  : 1950    Encounter Time: 12:44 PM     Date of Admission: 2022 12:25 PM    Primary Care Physician: No primary care provider on file. Reason for Consultation: Drain CT management     SUBJECTIVE:    Feeling ok    OBJECTIVE:     PHYSICAL EXAM:   VITALS:   Vitals:    22 0045 22 0930   BP: (!) 103/55  96/60 104/64   Pulse: 74  68 75   Resp: 20  20 18   Temp: 97.3 °F (36.3 °C)  97.5 °F (36.4 °C) 97.4 °F (36.3 °C)   TempSrc: Axillary  Oral Oral   SpO2:  98%  95%   Weight:       Height:            Intake/Output Summary (Last 24 hours) at 2022 1244  Last data filed at 2022 2336  Gross per 24 hour   Intake 260 ml   Output 200 ml   Net 60 ml          CONSTITUTIONAL:    Alert  SKIN:     No rash,   HEENT:     EOMI, MMM, No thrush  NECK:    No bruits, No JVP appreciated  CV:      Sinus,  No murmur, No rubs, No gallops  PULMONARY:   Couse BS,  No Wheezing, No Rales, No Rhonchi      CT noted  ABDOMEN:     Soft, non-tender. BS normal. No R/R/G  EXT:    No deformities . No clubbing. Edema ower extremity edema, No venous stasis  PULSE:   Appears equal and palpable.   PSYCHIATRIC:  Seems appropriate, No acute psychosis  MS:    Gross weakness  NEUROLOGIC:   The clinical assessment is non-focal     DATA: IMAGING & TESTING:     LABORATORY TESTS:    CBC:   Lab Results   Component Value Date/Time    WBC 3.8 2022 04:49 AM    RBC 3.14 2022 04:49 AM    HGB 8.9 2022 04:49 AM    HCT 29.7 2022 04:49 AM    MCV 94.6 2022 04:49 AM    MCH 28.3 2022 04:49 AM    MCHC 30.0 2022 04:49 AM    RDW 17.9 2022 04:49 AM     2022 04:49 AM    MPV 10.9 07/24/2022 04:49 AM     CMP:    Lab Results   Component Value Date/Time     07/24/2022 04:49 AM    K 3.4 07/24/2022 04:49 AM    K 5.0 07/06/2022 02:41 AM    CL 89 07/24/2022 04:49 AM    CO2 37 07/24/2022 04:49 AM    BUN 21 07/24/2022 04:49 AM    CREATININE 1.1 07/24/2022 04:49 AM    GFRAA 59 07/24/2022 04:49 AM    LABGLOM 49 07/24/2022 04:49 AM    GLUCOSE 130 07/24/2022 04:49 AM    PROT 6.0 07/24/2022 04:49 AM    LABALBU 4.0 07/24/2022 04:49 AM    CALCIUM 9.0 07/24/2022 04:49 AM    BILITOT 0.8 07/24/2022 04:49 AM    ALKPHOS 82 07/24/2022 04:49 AM    AST 7 07/24/2022 04:49 AM    ALT <5 07/24/2022 04:49 AM     Calcium:    Lab Results   Component Value Date/Time    CALCIUM 9.0 07/24/2022 04:49 AM     Ionized Calcium:  No results found for: IONCA  Magnesium:    Lab Results   Component Value Date/Time    MG 2.0 07/24/2022 04:49 AM     Phosphorus:    Lab Results   Component Value Date/Time    PHOS 3.6 07/24/2022 04:49 AM     PT/INR:    Lab Results   Component Value Date/Time    PROTIME 20.9 07/21/2022 04:34 AM    INR 1.9 07/21/2022 04:34 AM        PRO-BNP:   Lab Results   Component Value Date    PROBNP 37,248 (H) 07/12/2022    PROBNP 41,368 (H) 07/06/2022      ABGs:   Lab Results   Component Value Date/Time    PH 7.452 07/11/2022 05:07 AM    PO2 133.6 07/11/2022 05:07 AM    PCO2 40.0 07/11/2022 05:07 AM     Hemoglobin A1C: No components found for: HGBA1C    IMAGING:  Imaging tests were completed and reviewed and discussed radiology and care team involved and reveals   Echo Complete    Result Date: 7/7/2022  Findings   Left Ventricle  Micro-bubble contrast injected to enhance left ventricular visualization. Normal left ventricular size and systolic function. Ejection fraction is visually estimated at 70-75%. Indeterminate diastolic function. No regional wall motion abnormalities seen. Mild left ventricular concentric hypertrophy noted. Right Ventricle  Moderately dilated right ventricle.   Right ventricle global systolic function is reduced. TAPSE 1.1 cm. Left Atrium  The left atrium is moderate-severely dilated. JUANY 48 ml/m2. The interatrial septum appears intact. Right Atrium  Mildly enlarged right atrium. Mitral Valve  Structurally normal mitral valve. No evidence of mitral valve stenosis. Physiologic mitral regurgitation. Tricuspid Valve  The tricuspid valve appears structurally normal.  Mild tricuspid regurgitation. RVSP is at least 60 mmHg. Aortic Valve  Individual aortic valve leaflets are not clearly visualized. No hemodynamically significant aortic stenosis is present. No evidence of aortic valve regurgitation. Pulmonic Valve  The pulmonic valve was not well visualized. No evidence of pulmonic valve stenosis. No evidence of any pulmonic regurgitation. Pericardial Effusion  Prominent pericardial fat pad. No definitive evidence of pericardial effusion. Aorta  Aortic root dimension within normal limits. Miscellaneous  Dilated Inferior Vena Cava. Conclusions   Summary  Technically difficult study - limited visualization. Micro-bubble contrast injected to enhance left ventricular visualization. Normal left ventricular size and systolic function. Ejection fraction is visually estimated at 70-75%. Indeterminate diastolic function. No regional wall motion abnormalities seen. Mild left ventricular concentric hypertrophy noted. Moderately dilated right ventricle with reduced function. Biatrial dilation. Mild tricuspid regurgitation. RVSP is at least 60 mmHg. Prominent pericardial fat pad. No definitive evidence of pericardial effusion. Result Date: 7/5/2022  FINDINGS: Lower Chest: See the CT report of the chest dated the same date for full details. Organs: The liver is grossly unremarkable. The spleen is unremarkable. Gallbladder is been surgically removed. The pancreas reveals some mild fatty replacement. Both adrenal glands are within normal limits.   The CT CHEST WO CONTRAST    Result Date: 7/5/2022  FINDINGS: Mediastinum: The cardiac size is enlarged. There is a small pericardial effusion. Minimal probable gas in the right atrium is likely secondary to recent intravenous injection. The esophagus is normal.  No definite mediastinal masses or lymphadenopathy. Lungs/pleura: There is consolidation posterior right upper lobe and right middle lobe with linear density in the lingula and at the left posterior lung base. There is right lower lobe consolidation and moderate right pleural effusion. Upper Abdomen: There is a small volume of right upper quadrant ascites. There are surgical clips in the gallbladder fossa and right upper anterior abdominal wall. Soft Tissues/Bones: There is mild induration of the subcutaneous fat. There is moderate lower thoracic spondylosis with slight levoconvex curvature of the thoracic spine. No definite lytic or blastic osseous lesions. Bilateral lung infiltrates with greatest consolidation right lower lobe probably secondary to atelectasis and or pneumonia. Moderate right pleural effusion. Cardiomegaly and small pericardial effusion. The pericardial effusion is new. Small volume right upper abdominal ascites along with anasarca. This is new. CT GUIDED CHEST TUBE  Result Date: 7/8/2022  [facility] After obtaining informed consent, following the routine sterile prep and drape and after the administration of local anesthesia a needle was inserted into the right pleural space . Serous fluid was aspirated. Subsequently a guidewire was passed through the needle. Subsequently the needle was removed and over the guidewire the tract was dilated. Following dilatation a locking loop catheter was placed. Post procedure CT scan reveals the tube to be in good position. Specimen was sent to the laboratory. The patient tolerated the procedure well. There were no complications. Prior to the procedure a timeout occurred at  1542 hours.  The patient received 9 second  of  fluoroscopy. The patient received local anesthesia. The patient was monitored for 60 minutes by a registered nurse. Successful uncomplicated  right chest tube placement Recommendations: 1. Follow-up tube check in 2 weeks 2. Flush tube daily with 5 to 10 mL of sterile saline     MRI BRAIN WO CONTRAST  Result Date: 7/8/2022  FINDINGS: INTRACRANIAL STRUCTURES/VENTRICLES: There is no acute infarct. No mass effect, edema or hemorrhage is seen. Mild volume loss is seen in the cerebrum with mild chronic microvascular ischemic changes. No hydrocephalus or extra-axial fluid is seen. ORBITS: Prosthetic lenses are seen in the globes bilaterally. The orbits are otherwise grossly unremarkable. SINUSES: Mild mucosal thickening in the paranasal sinuses. There is pooling of secretions in the nasopharynx. Moderate bilateral mastoid effusions. BONES/SOFT TISSUES: The bone marrow signal intensity appears normal. The soft tissues demonstrate no acute abnormality. 1.  No acute intracranial abnormality. 2. No gross epileptogenic lesion is identified. 3. Bilateral mastoid effusions, which may be due to eustachian tube dysfunction or otomastoiditis. RECOMMENDATIONS: Unavailable     US DUP LOWER EXTREMITIES BILATERAL VENOUS  Within the visualized vessels there is no evidence for deep venous thrombosis       Assessment: 1. Acute hypoxic hypercapnic respiratory failure 2/2 mod R pleural effusion, possible HAP vs acute decompensated  HFpEF exacerbation (EF 70-75%)  2. Pulmonary hypertension likely Type II 2/2 acute decompensated diastolic heart failure, in the setting of Hx SERENA  3. Moderate R pleural effusion, likely 2/2 HAP (Klebsiella pneumoniae) vs acute decompensated heart failure. D4 CT, 250 cc output in 24 hrs  4. HAP (Klebsiella pneumoniae on resp culture, light growth). D1 cefepime  5. Elevated pro-BNP, multifactorial, likely acute decompensated HFpEF, septic shock.    6. Elevated troponin likely 2/2 demand ischemia  7. Permanent atrial fibrillation, s/p DCCV (April and May 2022), now rate controlled. 8. SHANTANU Stage III on CKD multifactorial, pre-renal (hemodynamically mediated, DHN 2/2 diuretic use, poor oral intake); vs ATN 2/2 septic shock. 9. Hypernatremia likely 2/2 over diuresis  10. Metabolic alkalosis likely contraction alkalosis 2/2 diuresis, poor oral intake  11. Pancytopenia likely 2/2 BM suppression 2/2 chronic illness, meds (Zosyn)  12. Acute on chronic macrocytic anemia, multifactorial, likely 2/2 chronic disease, blood draws,  Less likely hemolysis (no schistocytes, haptoglobin wnl) s/p 1 unit PRBC 7/11, FOBT positive today, with pinkish tinge on chest tube output  13. Thrombocytopenia,likely 2/2 #11, less likely HIT (not exposed to heparin products), DIC, hemolysis ruled out (work-up negative)  14. Prolonged INR, ? HFP wnl, no overt bleeding, apixaban and aspirin on hold. Given Vit K yesterday for INR 3.2>2 today  15. Low grade fever, multifactorial, ? meds (Precedex?), CLABSI?, no new leukocytosis, or hemodynamic instability   16. Hx of asthma  17. Hx of SERENA. Not using CPAP at home  18. Hx of HTN. -160`s  19. Hx of CAD  20. Hx of Type 2 DM with neuropathy. On Lantus 10 and Novolog SS at home  21. Hx of depression/anxiety. 22. Hx of restless leg syndrome  23. Hx of Parkinson's disease.  On Sinemet and ropinirole at home, which was discontinued upon discharge at [address]:   Wean oxygen  Placement pending      Jamie Beckett DO DO, MPH, Jayden Tony  Professor of Internal Medicine  Pulmonary, Critical Care and Sleep Medicine

## 2022-07-24 NOTE — PROGRESS NOTES
[facility]  Internal Medicine Residency Program  Progress Note - House Team     Patient:  Servando Baron 66 y.o. female MRN: [id number]     Date of Service: 7/24/2022     CC: AMS    Subjective     Overnight events:   BG within target range    Patient is practicing her tongue exercises to get a better diet. She feels good. She denies any CP, SOB, abdominal pain. Her abdominal swelling is down. Objective     Physical Exam:  Vitals: /64   Pulse 75   Temp 97.4 °F (36.3 °C) (Oral)   Resp 18   Ht 5' (1.524 m)   Wt 213 lb 12.8 oz (97 kg)   SpO2 95%   BMI 41.75 kg/m²     I & O - 24hr:   Intake/Output Summary (Last 24 hours) at 7/24/2022 1254  Last data filed at 7/23/2022 2336  Gross per 24 hour   Intake 260 ml   Output 200 ml   Net 60 ml      General Appearance: alert, appears stated age, and cooperative  HEENT:  Head: Normocephalic, no lesions, without obvious abnormality. Neck: no JVD  Lung: clear to auscultation bilaterally  Heart: regular rate and rhythm, S1, S2 normal, no murmur, click, rub or gallop  Abdomen: soft, non-tender; bowel sounds normal; no masses,  no organomegaly. Anasarca continues to improve. Extremities:  extremities normal, atraumatic, no cyanosis or edema  Musculokeletal: No joint swelling, no muscle tenderness. ROM normal in all joints of extremities.    Neurologic: Mental status: Alert, oriented, thought content appropriate  Subject  Pertinent Information & Imaging Studies, Consults   genesis  CBC:   Lab Results   Component Value Date/Time    WBC 3.8 07/24/2022 04:49 AM    RBC 3.14 07/24/2022 04:49 AM    HGB 8.9 07/24/2022 04:49 AM    HCT 29.7 07/24/2022 04:49 AM    MCV 94.6 07/24/2022 04:49 AM    MCH 28.3 07/24/2022 04:49 AM    MCHC 30.0 07/24/2022 04:49 AM    RDW 17.9 07/24/2022 04:49 AM     07/24/2022 04:49 AM    MPV 10.9 07/24/2022 04:49 AM     BMP:    Lab Results   Component Value Date/Time     07/24/2022 04:49 AM    K 3.4 07/24/2022 04:49 AM Antibiotic  Days  Day started                                  OXYGENATION: 3L nasal canula    DIET: purred        Resident's Assessment and Plan     Lenny Dorsey is a 66 y.o. female with  has a past medical history of A-fib ([facility] Acute on chronic congestive heart failure ([facility] [facility] Anxiety, Asthma, CAD (coronary artery disease), Cancer ([facility] [address] [facility] Chronic kidney disease, Depression, Diabetes mellitus ([facility] [address] H/O mammogram, Hx MRSA infection, Hyperlipidemia, Hypertension, Lateral epicondylitis, SERENA on CPAP, Parkinson's disease ([facility] [address] and Tubal ligation status. came here with CC   Chief Complaint   Patient presents with    Altered Mental Status     per ems family reports ams x 45 minutes, recent psych med changes and right toe amputation        SUMMARY OF HOSPITAL STAY: Lenny Dorsey is a 66 y.o. female admitted for acute hypoxic hypercapnic resp failure and AMS likely 2/2 pleural effusion, possible HAP and acute decompensated HFpEF exacerbation (EF 70-75%), and septic shock likely 2/2 pneumonia, hypernatremia and lactic acidosis. She was intubated and put in the ICU and extubated after improvement on 7/10/22. Patient has completed a 10 day course of cefepime 2000 mg IV q12hr and vancomycin for possible HAP. Patient had an episode of black diarrhea and was FOBT positive on 7/16/22. Patient's Hb dropped to 6.7 which required a packed RBC on 7/17/22 which brought the Hb up to 8.3 GS performed EGS (7/19) showing moderate diffuse gastritis and duodenitis. Chest tube placed (7/8) to drain pleural effusion likely due to heart failure. Pleural fluid analysis indicates transudative effusion. Chest tube was removed (7/21). Patient has been practicing improving her swallowing with tongue protrusion exercises.     Consults:   Critical Care  Pulmonology  Palliative care  Cardiology  Nephrology  [address] [place] [address]    Neurology  Acute encephalopathy - resolved  Intubated in ICU d/t AMS  Extubated on 7/10/22  Plan:  Hold home depakote d/t it's association with encephalopathy  Hx of Parkinson  Stable  Plan:  Continue home carbidopa-levodopa  Hx of restless leg syndrome  Patient complaining of worsening during night time  Plan:  Continue home ropinirole 1mg TID  Hx of depression and anxiety  Plan:  Continue home quetiapine     Cardiovascular  Hx of permanent A-fib  DCCV in April and May 2022, currently rate controlled  Plan:  Continue with metoprolol and amiodarone  Continue Eliquis  Hold digoxin d/t previus toxicity  Hx of HTN  Due to episodes of hypotension pt was reduced form 50mg to 25mg of metoprolol   Plan:  Continue metoprolol tartrate 25   Hx of CAD  Plan:  Continue ASA  Hx of HLD  Plan:  Continue atorvastatin 20mg  Anasarca 2/2 acute decompensated diastolic heart failure - improving  Hx of heart failure w/ preserved EF (70-75%, mild tricuspid regurg) last echo done 7/7/22  Did not use diuril yesterday   Plan:  Continue metoprolol  Per Nephro rec - continue Lasix drip (unless Cr rises), consider Diuril today, Acetazolamide BID     Pulmonology  Moderate RIGHT pleural effusion likely 2/2 acute decompensated HF vs ? HAP  Most recent CR on 7/15/22 shoed no sizable pleural effusion compared to the CXR on 7/13/22  Finished 10 day course of cefepime and vanc  Transudative pleural effusion on 7/8/22. Repeat pleural effusion analysis showed similar findings 7/20/22, however same time serum LDH and protein were not obtained  Net output of -19L since admission  Chest tube (placed 7/8) last output was 140ml. Per rec of pulmonology, chest tube removed (7/21)  CXR (7/22) - mild basilar atelectasis, no significant pleural effusion, no PTX, diminished insp effeort and elevation of RIGHT hemidiaphragm (same as before)  Plan:  Monitor resp status, wean O2 as able. Pulmonology following.    Hx of asthma  Plan:  Continue ipratropium-albuterol 1 ampule, inhalation Q4H WA  Hx of SERENA  Patient not using CPAP at home     Gastrointestinal  Mild-Moderate Oropharyngeal dysphasia  SLP re-evaluation  Modified barium swallow showed impaired swallowing mechanism with moderate laryngeal penetration with thin liquid barium. No barium aspiration.    Currently on purred diet  Plan:  Instruct patient to continue improving her swallowing with tongue protrusion exercises per SLP recommendation      Renal  Metabolic alkalosis likely 2/2 diuresis - improving   Normal bicarb on admission, currently improving (37)  Plan:  Per nephro (7/24) - give another dose of acetazolamide today, check BMP am   SHANTANU likely 2/2 diuresis - improved  Cr improving   Per nephro rec - lasix was stopped for increasing creatinine   Plan:  Monitor BMP  Hypokalemia  K 3.4   Patient refused the klorcon and lthe liquid is not recommended with her dysphasia   Plan:  IV KCl     Endocrinology  Hx of type 2 DM w/ neuropathyOn lantus 10 and Novolog SS at home  Patient was tried on 7 units glargine but had a BG of 95 so was switched back to 5 units  Plan:  Insulin glargine 5 units SQ nightly and insulin lispro 0-6 units SQ 4X daily AC and HS     Infectious disease  Septic shock likely 2/2 CAP - resolved  Lactic acidosis - resolved  Patient finished her 10 day course of vanc + cefepime      Heme-Onc  Acute on chronic anemia   EGD (7/19/22) showed moderate diffuse gastritis and duodenitis with no bleeding  Previous EGD in 2015 showed mild gastritis  FOBT (+) on 7/16/22  Melena 7/15/22  Required 2nd transfusion of 1pRBC on 7/17 Hb 6.7 > 8.3 post and a previous transfusion on of 1 pRBC on 7/8  H pylori negative   Plan:  Continue oral PPI, continue carafate   Monitor for drop of Hb     Problems resolved during this admission:   Acute encephalopathy multifactorial 2/2 septic shock, digoxin toxicity, uremia  Acute hypoxic hypercapnic respiratory failure likely 2/2 to RIGHT pleural effusion, possible HAP vs acute decompensated HFpEF exacerbation (EF 70-75)  Shock likely septic, HAP, digoxin toxicity  HAGMA 2/2 lactic acidiosis (hypoperfusion, septic shock), uremia 2/2 SHANTANU  Elevated troponin  SHANTANU stage III on CKD  Metabolic alkalosis likely contraction alkalosis     PT/OT: Continue  DVT ppx: Eliquid  GI ppx: Protonix 40mg PO BID and carafate, purred diet      Next of Kin/ POA:  daughter    Code Status:   Full code     Disposition:Oswald smith, may need oxygen on discharge       Mayco Harper MD, PGY-1  Attending physician: Dr. Barbra Aschoff

## 2022-07-24 NOTE — PROGRESS NOTES
[address] [address] [address]      Date:2022                                                  Patient Name: Junior Sanchez  MRN: [id number]  : 1950  Room: [address] [place], [place] [address]    Evaluating OT: EMERY Lindquist, OTR/L  # [id number]    Referring Provider:  Isaac Junior MD, Moe Dhillon MD  Specific Provider Orders:  Nish Mock and Treat\"  22    Diagnosis: Shock ([address] [R57.9]  Uremia [N19]  SHANTANU (acute kidney injury) ([address] [N17.9]  Poisoning by digoxin, accidental or unintentional, initial encounter [T46.0X1A]  Altered mental status, unspecified altered mental status type [R41.82]  Acute pancreatitis, unspecified complication status, unspecified pancreatitis type [K85.90]    Pt was admitted w/ Altered Mental Status, Bradycardia, Hypotension. Recent Med Changes for Psych issues/R Toe Amputation (22). Pertinent Medical History:  Pt has a past medical history of A-fib ([address] Acute on chronic congestive heart failure ([address] Anxiety, Asthma, CAD (coronary artery disease), Cancer ([address] Chronic kidney disease, Depression, Diabetes mellitus ([address] H/O mammogram, Hx MRSA infection, Hyperlipidemia, Hypertension, Lateral epicondylitis, SERENA on CPAP, Parkinson's disease ([address] and Tubal ligation status. ,  has a past surgical history that includes Gallbladder surgery; Toe amputation; Cholecystectomy; Colonoscopy (7/29/15); Endoscopy, colon, diagnostic (7/19/15); Upper gastrointestinal endoscopy; lumbar laminectomy; Tonsillectomy; Breast lumpectomy; Breast reduction surgery; Cardiac catheterization (2014); Cardiac catheterization (2022); CT GUIDED CHEST TUBE (2022); and Upper gastrointestinal endoscopy (N/A, 2022).     Surgeries this admission: None   Intubated 22, Right Pigtail Chest Tube Placed posteriorly Right Lung 22, Extubated 22    Precautions: Fall Risk  Pure-Wick Catheter  Minced/Moist Diet/No Straw      Assessment of current deficits   [x] Functional mobility  [x]ADLs  [x] Strength               []Cognition   [x] Functional transfers   [x] IADLs         [x] Safety Awareness   [x]Endurance   [] Fine Coordination              [x] Balance      [] Vision/perception   []Sensation    []Gross Motor Coordination  [] ROM  [] Delirium                   [] Motor Control       OT PLAN OF CARE   OT POC based on physician orders, patient diagnosis and results of clinical assessment    Frequency/Duration 1-3 days/wk for 2 weeks PRN   Specific OT Treatment to include:     * Instruction/training on adapted ADL techniques and AE recommendations to increase functional independence within precautions       * Training on energy conservation strategies, correct breathing pattern and techniques to improve independence/tolerance for self-care routine  * Functional transfer/mobility training/DME recommendations for increased independence, safety, and fall prevention  * Patient/Family education to increase follow through with safety techniques and functional independence  * Recommendation of environmental modifications for increased safety with functional transfers/mobility and ADLs  * Cognitive retraining/development of therapeutic activities to improve problem solving, judgement, memory, and attention for increased safety/participation in ADL/IADL tasks  * Therapeutic exercise to improve motor endurance, ROM, and functional strength for ADLs/functional transfers  * Therapeutic activities to facilitate/challenge dynamic balance, stand tolerance for increased safety and independence with ADLs  * Therapeutic activities to facilitate gross/fine motor skills for increased independence with ADLs  * Neuro-muscular re-education: facilitation of righting/equilibrium reactions  * Positioning to improve skin integrity, interaction with environment and functional independence  * Delirium prevention/treatment  * Manual techniques for edema management  Other:    Recommended Adaptive Equipment: TBD as pt progresses       Home Living:  Pt lives alone in a single-level apartment, Level Entry, No Basement. Bathroom setup:  Walk-in-Shower, Standard-height Commode   Equipment owned:  Rollator, Shower chair    Available Family Assist:  Family members live locally - provide assist PRN    Prior Level of Function:  Pt reported being IND with all ADLs, Light IADLs, Transfers and Mobility using Rollator for ambulation. Driving:  No  Occupation:  None reported    Pain Level:  pt reports \"headache\"    Cognition: A & O x 4 - able to ID current Day/date INDly   Able to Follow Multi-Step Commands w/ occasional Min VCs for safety   Memory:  good    Sequencing:  good (-)   Problem solving:  good (-)   Judgement/safety:  good (-)        Functional Assessment:  AM-PAC Daily Activity Raw Score: 15/24     Initial Eval Status  Date: 7/13/22   Treatment Status  Date:  7-24-22 STGs = LTGs  Time frame: 10-14 days   Feeding SUP/Set up     Set up NA   Grooming Min A/Set up    Min A for hair care  Initially required Min A for safety w/ dynamic sitting balance - improved to Close SUP EOB   Unable to tolerate ambulating to or standing at sink   Min A/Set up    Min A for quality of task o comb hair seated - required breaks d/t fatigue w/ UEs sustained overhead SUP/Set up  Seated/Standing at the Sink   UB Dressing Mod A/Set up    Simulated - seated EOB  Unable to tolerate item retrieval for activities   SBA    Donning/doffing gown seated in chair   Set up     LB Dressing Dep    Max A to don socks  Mod A of 2 for safety in standing - pants simulated  Pt ed for safe/adaptive techs, use of adaptive equip   Max A    Max A to don socks seated in chair     Min A     Bathing NT     NT Min A      Toileting Dep    Pure-Wick  Fecal Mgmt System   Max A    Per last tx Min A     Bed Mobility  Supine to sit:  Max A of 2   Sit to supine:  Max A of 2     VCs for safety   per last tx  (Seated in chair)     Supine to sit: SUP  Sit to supine: SUP     Functional Transfers Mod A of 2 for safety    Standing from EOB, mgmt of multiple lines  Pt ed for safety/hand placement   Min A- sit<->stand  Armed Chair 2x    Cuing for hand placement and body mechanics SUP     Functional Mobility Mod A of 2 for safety w/ [employer]    Side-stepping very short distance along EOB  Pt ed for safety/improved safety awareness, walker safety   Min A    Short distance at b/s, VCs for walker safety   SUP     Balance Sitting:     Static:  Min A -> SUP    Dynamic:  Min A -> Close SUP w/ functional ax EOB     Standing:     Static:  Mod A of 2 w/ [employer]    Dynamic:  Mod A of 2 w/ functional ax/mobility w/ [employer]   Sitting:     Static:  Ind in chair    Dynamic:  SUP w/ ax    Standing:     Static:  Min A     Dynamic: Min A w/ ax w/ [employer] Sitting:     Static:  IND    Dynamic:  IND w/ functional ax    Standing:     Static:  SUP w/ AD PRN    Dynamic:  SUP w/ functional ax/mobility w/ AD PRN   Activity Tolerance Fair(-)    Able to tolerate sitting EOB > 20 mins  Able to tolerate standing ~ 3-4 mins   Fair    Able to tolerate sitting in chair > 30 mins  Able to tolerate standing ~ 3-4 mins w/ light ax     Good(-)   Visual/  Perceptual    Hearing: WNL   Glasses: No    WFL   Hearing Aids:  No               Comments: Upon arrival pt seated in bedside chair. Pt educated on techniques to increase independence and safety during ADL's, and functional transfers. At the end of the session, patient seated in bedside chair. Call light and phone within reach, all lines and tubes intact. Overall patient demonstrated decreased independence and safety during completion of ADL/functional transfer/mobility tasks. Pt would benefit from continued skilled OT to increase safety and independence with completion of ADL/IADL tasks for functional independence and quality of life.       Pt and/or Family verbalized/demonstrated a Good understanding of education provided. Will Review PRN.       Time In: 1335  Time Out: 1351  Total Treatment Time: 16 minutes    Min Units   OT Eval Low 14189       OT Eval Medium 16660      OT Eval High [id number]      OT Re-Eval [id number]       Therapeutic Ex [id number]       Therapeutic Activities 00102  16  1   ADL/Self Care 80383       Orthotic Management 25885       Manual 03135     Neuro Re-Ed 09371       Non-Billable Time            Susie Ramirez, MOT, OTR/L  # [id number]

## 2022-07-24 NOTE — PLAN OF CARE
Problem: Chronic Conditions and Co-morbidities  Goal: Patient's chronic conditions and co-morbidity symptoms are monitored and maintained or improved  Outcome: Progressing     Problem: Discharge Planning  Goal: Discharge to home or other facility with appropriate resources  Outcome: Progressing     Problem: Pain  Goal: Verbalizes/displays adequate comfort level or baseline comfort level  Outcome: Progressing     Problem: Skin/Tissue Integrity  Goal: Absence of new skin breakdown  Description: 1. Monitor for areas of redness and/or skin breakdown  2. Assess vascular access sites hourly  3. Every 4-6 hours minimum:  Change oxygen saturation probe site  4. Every 4-6 hours:  If on nasal continuous positive airway pressure, respiratory therapy assess nares and determine need for appliance change or resting period.   Outcome: Progressing     Problem: ABCDS Injury Assessment  Goal: Absence of physical injury  Outcome: Progressing     Problem: Safety - Adult  Goal: Free from fall injury  Outcome: Progressing     Problem: Respiratory - Adult  Goal: Achieves optimal ventilation and oxygenation  Outcome: Progressing     Problem: Nutrition Deficit:  Goal: Optimize nutritional status  Outcome: Progressing     Problem: Skin/Tissue Integrity - Adult  Goal: Skin integrity remains intact  Outcome: Progressing     Problem: Genitourinary - Adult  Goal: Absence of urinary retention  Outcome: Progressing

## 2022-07-25 LAB
ALBUMIN SERPL-MCNC: 3.7 G/DL (ref 3.5–5.2)
ALP BLD-CCNC: 83 U/L (ref 35–104)
ALT SERPL-CCNC: <5 U/L (ref 0–32)
ANION GAP SERPL CALCULATED.3IONS-SCNC: 11 MMOL/L (ref 7–16)
AST SERPL-CCNC: 8 U/L (ref 0–31)
BASOPHILS ABSOLUTE: 0.02 E9/L (ref 0–0.2)
BASOPHILS RELATIVE PERCENT: 0.6 % (ref 0–2)
BILIRUB SERPL-MCNC: 0.8 MG/DL (ref 0–1.2)
BUN BLDV-MCNC: 19 MG/DL (ref 6–23)
CALCIUM SERPL-MCNC: 9.3 MG/DL (ref 8.6–10.2)
CHLORIDE BLD-SCNC: 92 MMOL/L (ref 98–107)
CO2: 38 MMOL/L (ref 22–29)
CREAT SERPL-MCNC: 1.1 MG/DL (ref 0.5–1)
EOSINOPHILS ABSOLUTE: 0.08 E9/L (ref 0.05–0.5)
EOSINOPHILS RELATIVE PERCENT: 2.6 % (ref 0–6)
GFR AFRICAN AMERICAN: 59
GFR NON-AFRICAN AMERICAN: 49 ML/MIN/1.73
GLUCOSE BLD-MCNC: 111 MG/DL (ref 74–99)
HCT VFR BLD CALC: 29.3 % (ref 34–48)
HEMOGLOBIN: 8.8 G/DL (ref 11.5–15.5)
IMMATURE GRANULOCYTES #: 0.02 E9/L
IMMATURE GRANULOCYTES %: 0.6 % (ref 0–5)
LYMPHOCYTES ABSOLUTE: 0.84 E9/L (ref 1.5–4)
LYMPHOCYTES RELATIVE PERCENT: 26.8 % (ref 20–42)
MAGNESIUM: 2 MG/DL (ref 1.6–2.6)
MCH RBC QN AUTO: 28.1 PG (ref 26–35)
MCHC RBC AUTO-ENTMCNC: 30 % (ref 32–34.5)
MCV RBC AUTO: 93.6 FL (ref 80–99.9)
METER GLUCOSE: 116 MG/DL (ref 74–99)
METER GLUCOSE: 126 MG/DL (ref 74–99)
METER GLUCOSE: 193 MG/DL (ref 74–99)
METER GLUCOSE: 209 MG/DL (ref 74–99)
MONOCYTES ABSOLUTE: 0.24 E9/L (ref 0.1–0.95)
MONOCYTES RELATIVE PERCENT: 7.7 % (ref 2–12)
NEUTROPHILS ABSOLUTE: 1.93 E9/L (ref 1.8–7.3)
NEUTROPHILS RELATIVE PERCENT: 61.7 % (ref 43–80)
PDW BLD-RTO: 17.9 FL (ref 11.5–15)
PHOSPHORUS: 3.9 MG/DL (ref 2.5–4.5)
PLATELET # BLD: 182 E9/L (ref 130–450)
PMV BLD AUTO: 11.3 FL (ref 7–12)
POTASSIUM SERPL-SCNC: 4 MMOL/L (ref 3.5–5)
RBC # BLD: 3.13 E12/L (ref 3.5–5.5)
SODIUM BLD-SCNC: 141 MMOL/L (ref 132–146)
TOTAL PROTEIN: 6.1 G/DL (ref 6.4–8.3)
WBC # BLD: 3.1 E9/L (ref 4.5–11.5)

## 2022-07-25 PROCEDURE — 94640 AIRWAY INHALATION TREATMENT: CPT

## 2022-07-25 PROCEDURE — 6370000000 HC RX 637 (ALT 250 FOR IP): Performed by: SURGERY

## 2022-07-25 PROCEDURE — 6370000000 HC RX 637 (ALT 250 FOR IP): Performed by: INTERNAL MEDICINE

## 2022-07-25 PROCEDURE — 6370000000 HC RX 637 (ALT 250 FOR IP): Performed by: STUDENT IN AN ORGANIZED HEALTH CARE EDUCATION/TRAINING PROGRAM

## 2022-07-25 PROCEDURE — 84100 ASSAY OF PHOSPHORUS: CPT

## 2022-07-25 PROCEDURE — 99233 SBSQ HOSP IP/OBS HIGH 50: CPT | Performed by: INTERNAL MEDICINE

## 2022-07-25 PROCEDURE — 2700000000 HC OXYGEN THERAPY PER DAY

## 2022-07-25 PROCEDURE — 99231 SBSQ HOSP IP/OBS SF/LOW 25: CPT | Performed by: INTERNAL MEDICINE

## 2022-07-25 PROCEDURE — 6360000002 HC RX W HCPCS: Performed by: SURGERY

## 2022-07-25 PROCEDURE — 2580000003 HC RX 258: Performed by: SURGERY

## 2022-07-25 PROCEDURE — 85025 COMPLETE CBC W/AUTO DIFF WBC: CPT

## 2022-07-25 PROCEDURE — 83735 ASSAY OF MAGNESIUM: CPT

## 2022-07-25 PROCEDURE — 80053 COMPREHEN METABOLIC PANEL: CPT

## 2022-07-25 PROCEDURE — 82962 GLUCOSE BLOOD TEST: CPT

## 2022-07-25 PROCEDURE — 1200000000 HC SEMI PRIVATE

## 2022-07-25 PROCEDURE — S5553 INSULIN LONG ACTING 5 U: HCPCS | Performed by: STUDENT IN AN ORGANIZED HEALTH CARE EDUCATION/TRAINING PROGRAM

## 2022-07-25 PROCEDURE — 36415 COLL VENOUS BLD VENIPUNCTURE: CPT

## 2022-07-25 RX ORDER — BUMETANIDE 1 MG/1
1 TABLET ORAL
Status: DISCONTINUED | OUTPATIENT
Start: 2022-07-25 | End: 2022-07-27 | Stop reason: HOSPADM

## 2022-07-25 RX ADMIN — IPRATROPIUM BROMIDE AND ALBUTEROL SULFATE 1 AMPULE: .5; 2.5 SOLUTION RESPIRATORY (INHALATION) at 20:15

## 2022-07-25 RX ADMIN — ANORECTAL OINTMENT: 15.7; .44; 24; 20.6 OINTMENT TOPICAL at 10:16

## 2022-07-25 RX ADMIN — PANTOPRAZOLE SODIUM 40 MG: 40 TABLET, DELAYED RELEASE ORAL at 05:10

## 2022-07-25 RX ADMIN — ANORECTAL OINTMENT: 15.7; .44; 24; 20.6 OINTMENT TOPICAL at 21:53

## 2022-07-25 RX ADMIN — ROPINIROLE HYDROCHLORIDE 1 MG: 1 TABLET, FILM COATED ORAL at 15:15

## 2022-07-25 RX ADMIN — CARBIDOPA AND LEVODOPA 2 TABLET: 25; 100 TABLET, EXTENDED RELEASE ORAL at 21:49

## 2022-07-25 RX ADMIN — CARBIDOPA AND LEVODOPA 2 TABLET: 25; 100 TABLET, EXTENDED RELEASE ORAL at 15:15

## 2022-07-25 RX ADMIN — SODIUM CHLORIDE, PRESERVATIVE FREE 10 ML: 5 INJECTION INTRAVENOUS at 21:49

## 2022-07-25 RX ADMIN — BUDESONIDE 500 MCG: 0.5 SUSPENSION RESPIRATORY (INHALATION) at 20:16

## 2022-07-25 RX ADMIN — CARBIDOPA AND LEVODOPA 2 TABLET: 25; 100 TABLET, EXTENDED RELEASE ORAL at 10:14

## 2022-07-25 RX ADMIN — IPRATROPIUM BROMIDE AND ALBUTEROL SULFATE 1 AMPULE: .5; 2.5 SOLUTION RESPIRATORY (INHALATION) at 16:57

## 2022-07-25 RX ADMIN — BUDESONIDE 500 MCG: 0.5 SUSPENSION RESPIRATORY (INHALATION) at 08:59

## 2022-07-25 RX ADMIN — INSULIN LISPRO 1 UNITS: 100 INJECTION, SOLUTION INTRAVENOUS; SUBCUTANEOUS at 17:27

## 2022-07-25 RX ADMIN — METOPROLOL TARTRATE 25 MG: 25 TABLET, FILM COATED ORAL at 10:14

## 2022-07-25 RX ADMIN — SUCRALFATE 1 G: 1 TABLET ORAL at 10:14

## 2022-07-25 RX ADMIN — SUCRALFATE 1 G: 1 TABLET ORAL at 17:27

## 2022-07-25 RX ADMIN — IPRATROPIUM BROMIDE AND ALBUTEROL SULFATE 1 AMPULE: .5; 2.5 SOLUTION RESPIRATORY (INHALATION) at 08:59

## 2022-07-25 RX ADMIN — AMIODARONE HYDROCHLORIDE 200 MG: 200 TABLET ORAL at 10:14

## 2022-07-25 RX ADMIN — QUETIAPINE FUMARATE 25 MG: 25 TABLET ORAL at 21:49

## 2022-07-25 RX ADMIN — PANTOPRAZOLE SODIUM 40 MG: 40 TABLET, DELAYED RELEASE ORAL at 17:27

## 2022-07-25 RX ADMIN — Medication 5 MG: at 21:49

## 2022-07-25 RX ADMIN — INSULIN GLARGINE-YFGN 5 UNITS: 100 INJECTION, SOLUTION SUBCUTANEOUS at 21:49

## 2022-07-25 RX ADMIN — APIXABAN 5 MG: 5 TABLET, FILM COATED ORAL at 21:49

## 2022-07-25 RX ADMIN — ATORVASTATIN CALCIUM 20 MG: 20 TABLET, FILM COATED ORAL at 10:14

## 2022-07-25 RX ADMIN — APIXABAN 5 MG: 5 TABLET, FILM COATED ORAL at 10:14

## 2022-07-25 RX ADMIN — SUCRALFATE 1 G: 1 TABLET ORAL at 05:10

## 2022-07-25 RX ADMIN — ROPINIROLE HYDROCHLORIDE 1 MG: 1 TABLET, FILM COATED ORAL at 21:49

## 2022-07-25 RX ADMIN — INSULIN LISPRO 3 UNITS: 100 INJECTION, SOLUTION INTRAVENOUS; SUBCUTANEOUS at 21:48

## 2022-07-25 RX ADMIN — QUETIAPINE FUMARATE 25 MG: 25 TABLET ORAL at 10:20

## 2022-07-25 RX ADMIN — BUMETANIDE 1 MG: 1 TABLET ORAL at 15:15

## 2022-07-25 RX ADMIN — ROPINIROLE HYDROCHLORIDE 1 MG: 1 TABLET, FILM COATED ORAL at 10:14

## 2022-07-25 RX ADMIN — SODIUM CHLORIDE, PRESERVATIVE FREE 10 ML: 5 INJECTION INTRAVENOUS at 10:15

## 2022-07-25 RX ADMIN — ASPIRIN 81 MG CHEWABLE TABLET 81 MG: 81 TABLET CHEWABLE at 10:14

## 2022-07-25 NOTE — PROGRESS NOTES
Comprehensive Nutrition Assessment    Type and Reason for Visit:  Reassess    Nutrition Recommendations/Plan:   Recommend to Modify Current Diet to NTL per SLP recs. Will Start ONS and monitor. Malnutrition Assessment:  Malnutrition Status: At risk for malnutrition (Comment) (07/11/22 1301)    Context:  Acute Illness     Findings of the 6 clinical characteristics of malnutrition:  Energy Intake:  50% or less of estimated energy requirements for 5 or more days  Weight Loss:  Unable to assess (d/t wt fluctuation hx)     Body Fat Loss:  No significant body fat loss     Muscle Mass Loss:  No significant muscle mass loss    Fluid Accumulation:  No significant fluid accumulation     Strength:  Not Performed    Nutrition Assessment:    Pt remains at risk d/t adm w/ Septic shock/Acute encephalopathy 2/2 possible HAP, +SHANTANU/CKD, +Rt Pleural effusion s/p CT placement 7/8 (removed 7/22), s/p extubation 7/10, +Mod Dysphagia s/p BSE 7/11 and MBS 7/21. Noted GIB/anemia, s/p EGD 7/19 revealing gastroduodenitis. PMHx DM, CHF, CAD, CKD, CA, Parkinson's, s/p recent Rt 2nd toe amp. Pt remains at nutritional risk d/t noted ongoing sporadic poor katy/intake since placed on diet. Will Start ONS and monitor. Nutrition Related Findings:    A&O, confused at times, poor dentition, Abd/BS WDL, Trace edema, -I/O's Wound Type: Multiple, Open Wounds (toes x 2)       Current Nutrition Intake & Therapies:    Average Meal Intake: 51-75% (sporadic intake at times noted)  Average Supplements Intake: None Ordered  ADULT DIET; Dysphagia - Pureed; 5 carb choices (75 gm/meal); Low Fat/Low Chol/High Fiber/2 gm Na    Anthropometric Measures:  Height: 5' (152.4 cm)  Ideal Body Weight (IBW): 100 lbs (45 kg)    Admission Body Weight: 234 lb (106.1 kg) (7/8 first measured)  Current Body Weight: 210 lb (95.3 kg) (bed 7/25, wt loss noted since adm likely 2/2 -I/O's currently), 231 % IBW.  Weight Source: Bed Scale  Current BMI (kg/m2): 41  Usual Body Weight:  (variable EMR measured wt ranges 219-244lb x 6 mon back)  % Weight Change (Calculated): 1.8  Weight Adjustment For: No Adjustment                 BMI Categories: Obese Class 3 (BMI 40.0 or greater)    Estimated Daily Nutrient Needs:  Energy Requirements Based On: Formula  Weight Used for Energy Requirements: Current  Energy (kcal/day):   Weight Used for Protein Requirements: Ideal  Protein (g/day): 1.8-2.2 gm/kg IBW= ; as tolerated w/ SHANTANU/CKD  Method Used for Fluid Requirements: 1 ml/kcal  Fluid (ml/day):     Nutrition Diagnosis:   Inadequate oral intake related to cognitive or neurological impairment (2/2 Parkinsons w/ Dysphagia) as evidenced by intake 51-75%, swallow study results    Nutrition Interventions:   Food and/or Nutrient Delivery: Start Oral Nutrition Supplement, Modify Current Diet (Recommend to Modify Current Diet to NTL per SLP recs. Will Start ONS and monitor.)  Nutrition Education/Counseling: No recommendation at this time  Coordination of Nutrition Care: Continue to monitor while inpatient       Goals:  Previous Goal Met: Goal(s) Achieved  Goals: PO intake 75% or greater, by next RD assessment  Specify Other Goals: Nutrition Progression- PO diet vs EN    Nutrition Monitoring and Evaluation:   Behavioral-Environmental Outcomes: None Identified  Food/Nutrient Intake Outcomes: Food and Nutrient Intake, Supplement Intake  Physical Signs/Symptoms Outcomes: Biochemical Data, Chewing or Swallowing, GI Status, Fluid Status or Edema, Nutrition Focused Physical Findings, Skin, Weight    Discharge Planning:     Too soon to determine     Nato Kennedy RD, LD  Contact: ext [phone]

## 2022-07-25 NOTE — PROGRESS NOTES
[facility]  Internal Medicine Residency / [address]    Attending Physician Statement  I have discussed the case, including pertinent history and exam findings with the resident and the team.  I have seen and examined the patient and the key elements of the encounter have been performed by me. I have also personally reviewed imaging studies and labs. I agree with the assessment, plan and orders as documented by the resident. No acute complaints. Remains on minimal oxygen per NC. Clear breath sounds. Cardiac rate normal, in sinus. Minimal sacral edema. UO remains clear, but urine output not recorded accurately. BB held last night for low normal BP. Assessment:  SHANATNU, metabolic alkalosis may be from diuresis - continues to improve  Acute hypoxic respiratory failure 2/2 to CAP, transudative pleural effusion likely from HFpEF - stable on minimal oxygen per NC. S/p chest tube insertion, R - removed 7/22/22. Acute on chronic anemia. Gastritis and duodenitis seen on EGD (7/19/22), no active bleeding and Hgb has been stable. H pylori negative. Mild to moderate oropharyngeal dysphagia still seen on MBS (7/20/22)  HTN, improved readings after hold BB. HR remains controlled. RLS, controlled with current management  AF, currently in sinus. Now only on amiodarone. DM, insulin-requiring, controlled  Septic shock, resolved. Abx course completed. Acute encephalopathy, resolved        Plan:  Keep holding BB, resume as BP permits  Continue current insulin regimen  Further diuresis and continuation of acetazolamide as recommended by Nephrology service  Continue ASA and eliquis. Monitor for drop of  hgb, recurrence of bleeding  Continue PPI, carafate  Continue holding digoxin, depakote  No change in diet  Precert resubmitted today  Appreciate care from all consult services. Remainder of medical problems as per resident note. Robbie Kam MD  Internal Medicine

## 2022-07-25 NOTE — PROGRESS NOTES
[facility] MD Jonathan Cotto MD Janace Milroy, MD .  Progress Note      Patient's Name: Indy Muñoz  12:24 PM  7/25/2022    Subjective  7/7 : seen in her room intubated mechanically ventilated fio2 40 . LE edema 1-2+ . On levophed . UO ok over last 24 hours     7/8 pt not in her room when coming to see her . She is in IR lab. Case d/w RN     7/9 seen in her room d/w ICU resident   Still on some pressors actually BP low when seeing her   Has CT in place with good drainage . Good UO in response to lasix via genao cath . Fio2 40 PEEP 8       7/10 seen in ICU intubated mechanically ventilated fio2 40 PEEP 8   1-2+ edema in UE and LEs   No pressors  On precedex drip     7/11 : seen in ICU extubated earlier today . BP stable on HFNC    7/12 seen in her room ,extubated , on o2/nc . Chest tube in place . Fevers today and precedex drip put on hold .good UO over last 24 hours     7/13 : seen in her room on o2/nc BP stable clinically doing better still with CT along with genao and fecal system    7/14 sitting in chair comfortable on o2/nc BP stable LE edema dependent . 7/15 ; weak on o2/nc . 7/16: In better spirits today. Denies dyspnea on nasal cannula. Receiving breathing treatment. Ongoing fatigue, generalized weakness and debilitation. Good appetite. Continued marked swelling. Asks about eating potato chips, discussed why this would be a bad idea. 7/18: Orthopnea. Unable to lay flat for EGD. Occasional wheezing. No dyspnea at rest at 30 degrees on nasal cannula. Swelling in her thighs sacrum and abdominal pannus seem worse to her. Very little distal swelling. Hungry and thirsty. Ongoing fatigue and generalized weakness. No chest pain or palpitations    7/19: Sleepy, somnolent though easily  awakened. Thinks her swelling may be a little bit better. 7/20: Feeling better.   No dyspnea at rest.  Abdominal pannus swelling seems to be a little better. No peripheral swelling.    7/21: Ongoing fatigue and generalized weakness. Denies dyspnea at rest on room air. No peripheral swelling, though still has substantial edema in her abdominal pannus, upper thighs sacrum and lower back. 7/22: Feeling little bit better. Denies new complaint. ROS unremarkable    7/23 : seen in her room , stable bp on RA , appear euvolemic     7/24 working with physical therapy weak no LE edema . 7/25 : seen in her room , on RA . Comfortable . In NAD     History of Present Ilness:      69-year old female with a past medical history of hypertension, A. fib, CAD, asthma, HFpEF, CKD, SERENA, hyperlipidemia, type II DM, anxiety/depression, Parkinson's disease, restless leg syndrome who presented in the ED for altered mental status. She recently had a right second toe amputation back last month at [facility].  She was discharged after 2 weeks. According to the patient's family, she was doing okay until few hours prior to admission, patient was noted to be acting different yesterday morning. She was noted to be drowsy    Nephrology asked to see for SHANTANU   I did see pt in ICU she is intubated mechanically ventilated . She is on levophed . She has some LE edema. Vent setting stable .    UO marginal .       Allergies:  Ciprofloxacin and Codeine    Current Medications:    metoprolol tartrate (LOPRESSOR) tablet 25 mg, BID  insulin glargine-yfgn (SEMGLEE-YFGN) injection vial 5 Units, Nightly  pantoprazole (PROTONIX) tablet 40 mg, BID AC  apixaban (ELIQUIS) tablet 5 mg, BID  aspirin chewable tablet 81 mg, Daily  sucralfate (CARAFATE) tablet 1 g, 4 times per day  menthol-zinc oxide (CALMOSEPTINE) 0.44-20.6 % ointment, BID  0.9 % sodium chloride infusion, PRN  rOPINIRole (REQUIP) tablet 1 mg, TID  benzocaine-menthol (CEPACOL SORE THROAT) lozenge 1 lozenge, Q2H PRN  QUEtiapine (SEROQUEL) tablet 25 mg, BID  melatonin disintegrating tablet 5 mg, Nightly  labetalol (NORMODYNE;TRANDATE) injection 10 mg, Q6H PRN  hydrALAZINE (APRESOLINE) injection 10 mg, Q6H PRN  amiodarone (CORDARONE) tablet 200 mg, Daily  insulin lispro (HUMALOG) injection vial 0-6 Units, 4x Daily AC & HS  carbidopa-levodopa (SINEMET CR)  MG per extended release tablet 2 tablet, TID  ondansetron (ZOFRAN) injection 4 mg, Q6H PRN  budesonide (PULMICORT) nebulizer suspension 500 mcg, BID  levalbuterol (XOPENEX) nebulization 0.63 mg, Q4H PRN  ipratropium-albuterol (DUONEB) nebulizer solution 1 ampule, Q4H WA  polyethylene glycol (GLYCOLAX) packet 17 g, BID  atorvastatin (LIPITOR) tablet 20 mg, Daily  sodium chloride flush 0.9 % injection 5-40 mL, 2 times per day  0.9 % sodium chloride infusion, PRN  acetaminophen (TYLENOL) tablet 650 mg, Q6H PRN   Or  acetaminophen (TYLENOL) suppository 650 mg, Q6H PRN  glucose chewable tablet 16 g, PRN  dextrose bolus 10% 125 mL, PRN   Or  dextrose bolus 10% 250 mL, PRN  glucagon (rDNA) injection 1 mg, PRN  dextrose 5 % solution, PRN      Review of Systems:   Unable to obtain due to clinical conditon . Physical exam:   Vital signs /63   Pulse 68   Temp 97.8 °F (36.6 °C) (Oral)   Resp 16   Ht 5' (1.524 m)   Wt 210 lb 1.6 oz (95.3 kg)   SpO2 90%   BMI 41.03 kg/m²   Gen : moderate distress  Head : at , nc   Neck : supple , no thyromegaly noted . Eyes : EOMI , PERRLA   CV : tachycardiac 1+ edema in LE   Lungs: good flow heard b/l   Abd : soft , NT , BS + , No Organomegaly appreciated . Skin : soft, dry . Extremities: 2+ pitting edema lower back sacrum and thighs and inferior abdominal pannus, though no swelling in her distal lower and upper extremities  Neuro : CN  II-XII grossly intact , no focal neurologic deficit . Psych : cooperative .      Data:   Labs:  CBC with Differential:    Lab Results   Component Value Date/Time    WBC 3.1 07/25/2022 04:46 AM    RBC 3.13 07/25/2022 04:46 AM    HGB 8.8 07/25/2022 04:46 AM    HCT 29.3 07/25/2022 04:46 AM    PLT 182 07/25/2022 04:46 AM    MCV 93.6 07/25/2022 04:46 AM    MCH 28.1 07/25/2022 04:46 AM    MCHC 30.0 07/25/2022 04:46 AM    RDW 17.9 07/25/2022 04:46 AM    NRBC 0.0 03/15/2019 09:10 AM    SEGSPCT 74 08/20/2013 10:45 AM    METASPCT 0.9 07/14/2022 05:31 AM    LYMPHOPCT 26.8 07/25/2022 04:46 AM    MONOPCT 7.7 07/25/2022 04:46 AM    MYELOPCT 2.6 07/14/2022 05:31 AM    EOSPCT 1 06/16/2017 12:00 AM    BASOPCT 0.6 07/25/2022 04:46 AM    MONOSABS 0.24 07/25/2022 04:46 AM    LYMPHSABS 0.84 07/25/2022 04:46 AM    EOSABS 0.08 07/25/2022 04:46 AM    BASOSABS 0.02 07/25/2022 04:46 AM     CMP:    Lab Results   Component Value Date/Time     07/25/2022 04:46 AM    K 4.0 07/25/2022 04:46 AM    K 5.0 07/06/2022 02:41 AM    CL 92 07/25/2022 04:46 AM    CO2 38 07/25/2022 04:46 AM    BUN 19 07/25/2022 04:46 AM    CREATININE 1.1 07/25/2022 04:46 AM    GFRAA 59 07/25/2022 04:46 AM    LABGLOM 49 07/25/2022 04:46 AM    GLUCOSE 111 07/25/2022 04:46 AM    PROT 6.1 07/25/2022 04:46 AM    LABALBU 3.7 07/25/2022 04:46 AM    CALCIUM 9.3 07/25/2022 04:46 AM    BILITOT 0.8 07/25/2022 04:46 AM    ALKPHOS 83 07/25/2022 04:46 AM    AST 8 07/25/2022 04:46 AM    ALT <5 07/25/2022 04:46 AM     Ionized Calcium:  No results found for: IONCA  Magnesium:    Lab Results   Component Value Date/Time    MG 2.0 07/25/2022 04:46 AM     Phosphorus:    Lab Results   Component Value Date/Time    PHOS 3.9 07/25/2022 04:46 AM     U/A:    Lab Results   Component Value Date/Time    COLORU Yellow 07/05/2022 05:43 PM    PHUR 5.5 07/05/2022 05:43 PM    WBCUA 1-3 07/05/2022 05:43 PM    RBCUA NONE 07/05/2022 05:43 PM    RBCUA NONE 03/25/2013 09:00 PM    YEAST RARE 10/27/2015 07:18 PM    BACTERIA FEW 07/05/2022 05:43 PM    CLARITYU Clear 07/05/2022 05:43 PM    SPECGRAV 1.025 07/05/2022 05:43 PM    LEUKOCYTESUR Negative 07/05/2022 05:43 PM    UROBILINOGEN 0.2 07/05/2022 05:43 PM    BILIRUBINUR Negative 07/05/2022 05:43 PM    BLOODU Negative 07/05/2022 05:43 PM    GLUCOSEU Negative 07/05/2022 05:43 PM     Microalbumen/Creatinine ratio:  No components found for: RUCREAT  Iron Saturation:  No components found for: PERCENTFE  TIBC:    Lab Results   Component Value Date/Time    TIBC 152 07/09/2022 08:01 AM     FERRITIN:    Lab Results   Component Value Date/Time    FERRITIN 243 07/09/2022 08:01 AM        Imaging:  XR CHEST PORTABLE   Final Result   1. Mild basilar atelectasis. 2.  Diminished inspiratory effort and elevation the right hemidiaphragm   similar to the prior examination. 3.  No significant pleural effusion is visible. 4.  No evidence of pneumothorax. Fluoroscopy modified barium swallow with video   Final Result   Impaired swallowing mechanism with moderate laryngeal penetration with thin   liquid barium. No barium aspiration. Please see separate speech pathology report for full discussion of findings   and recommendations. XR CHEST PORTABLE   Final Result   No sizable pleural effusion. XR CHEST PORTABLE   Final Result   Mild subsegmental atelectasis in the left lower lobe. XR CHEST PORTABLE   Final Result   Grossly stable pulmonary opacities in the lower left lung, which may   represent atelectasis or pneumonia. XR CHEST PORTABLE   Final Result   1. Patchy right perihilar and left lower lobe airspace disease   2. Status post removal of the endotracheal tube and NG tube      3. There is no pneumothorax. XR CHEST PORTABLE   Final Result   Lines and tubes are stable with no pneumothorax on the right. Some   improvement seen in the region of left lung base in the interval.         XR CHEST PORTABLE   Final Result   The evaluation is limited due to low lung volumes. No interval change compared to prior exam.      Lines and tubes are in unchanged position. XR ABDOMEN FOR NG/OG/NE TUBE PLACEMENT   Final Result   Catheter is in the stomach.          XR CHEST PORTABLE   Final Result   Catheter placed with significant right lung aeration improvement and no   pneumothorax         MRI BRAIN WO CONTRAST   Final Result   1. No acute intracranial abnormality. 2. No gross epileptogenic lesion is identified. 3. Bilateral mastoid effusions, which may be due to eustachian tube   dysfunction or otomastoiditis. RECOMMENDATIONS:   Unavailable         CT GUIDED CHEST TUBE   Final Result   Successful uncomplicated  right chest tube placement      Recommendations:   1. Follow-up tube check in 2 weeks   2. Flush tube daily with 5 to 10 mL of sterile saline            XR CHEST PORTABLE   Final Result   1. There is no interval change in the bilateral perihilar and lower lobe   airspace disease more prominent within the right lung. 2. Moderate size right pleural effusion. 3. There is no pneumothorax. US RETROPERITONEAL COMPLETE   Final Result   1. Marked bilateral renal cortical thinning. Echogenic renal parenchyma. No hydronephrosis. 2.  A Diaz catheter appears to be decompressing the bladder. XR CHEST PORTABLE   Final Result   Increasing infiltrate throughout the right lung persistent left basilar   alveolar infiltrate is well. XR CHEST PORTABLE   Final Result   Adequately positioned right internal jugular central venous line. Otherwise,   no significant change compared to prior exam glued in lines and tubes. US DUP LOWER EXTREMITIES BILATERAL VENOUS   Final Result   Within the visualized vessels there is no evidence for deep venous   thrombosis               XR CHEST PORTABLE   Final Result   1. No significant change. Cardiomegaly with right pleural effusion. 2.  Support tubes remain in appropriate position. CT HEAD WO CONTRAST   Final Result   No acute intracranial abnormality or significant change in the overall   appearance of the brain.  MRI would be useful if symptoms persist.      RECOMMENDATIONS:   Unavailable         XR ABDOMEN FOR NG/OG/NE TUBE PLACEMENT   Final Result   Catheter is in the proximal stomach. XR CHEST PORTABLE   Final Result   Adequately positioned endotracheal tube. Nasogastric tube coursing below   diaphragm. Otherwise, stable exam.         CT ABDOMEN PELVIS WO CONTRAST Additional Contrast? None   Final Result   Increasing fluid within the abdomen when compared to the prior study. The   anasarca is also increased in appearance of the prior examination. Mild stranding seen around the mesentery which correlation to clinical lab   values is recommended to exclude possible pancreatitis or volume overload. Overall the appearance of the pancreas itself is grossly unremarkable. There   is only mild stranding seen throughout the mesenteric root. CT CHEST WO CONTRAST   Final Result   Bilateral lung infiltrates with greatest consolidation right lower lobe   probably secondary to atelectasis and or pneumonia. Moderate right pleural effusion. Cardiomegaly and small pericardial effusion. The pericardial effusion is new. Small volume right upper abdominal ascites along with anasarca. This is new. XR CHEST PORTABLE   Final Result   New opacity on the right which may relate to pleural effusion with adjacent   atelectasis and or infiltrate. Cardiomegaly and pulmonary vascular   congestion implying elevated left heart filling pressures. Assessment    1. Acute kidney injury non oligouric: improved   Baseline cr 0.9  Etiology of SHANTANU due to decrease effective circulating volume in setting of intravascular volume depletion as evident by her need for levophed and her urine lytes with high avidity for cl and Na . Basically bland urine sediment which make GN/vasculitis unlikely   High BUN in part due to steroids   LE edema noted though she is still on levophed    2. Encephalopathy metabolic multifactorial : Improved    3. Digoxin toxicity s/p digbind. Resolved    4.   Septic shock 2/2 PNA : resolved    5. Right Pleural Effusion with catheter in place for drainage    6.   Edema, marked, multifactorial        Recommendations    Cr stable   Euvolemic     Continue to support hemoydnamics  Bumex 1 mg po MWF   Pt/ot  Am labs      Electronically signed by Cristian Quintero MD on 7/25/2022

## 2022-07-25 NOTE — PROGRESS NOTES
[facility]  Internal Medicine Residency Program  Progress Note - House Team     Patient:  Mona Kinney 66 y.o. female MRN: [id number]     Date of Service: 7/25/2022     CC: AMS    Subjective     Overnight events:   Blood pressure remained borderline hypotensive but no complaints    Patient feels better each day. She denies any chest pain, shortness of breath or abdominal pain. She's had no problem urinating. Objective     Physical Exam:  Vitals: /63   Pulse 68   Temp 97.8 °F (36.6 °C) (Oral)   Resp 16   Ht 5' (1.524 m)   Wt 210 lb 1.6 oz (95.3 kg)   SpO2 90%   BMI 41.03 kg/m²     I & O - 24hr:   Intake/Output Summary (Last 24 hours) at 7/25/2022 1458  Last data filed at 7/25/2022 0555  Gross per 24 hour   Intake 480 ml   Output 500 ml   Net -20 ml      General Appearance: alert, appears stated age, and cooperative  HEENT:  Head: Normocephalic, no lesions, without obvious abnormality. Neck: no JVD  Lung: clear to auscultation bilaterally  Heart: regular rate and rhythm, S1, S2 normal, no murmur, click, rub or gallop  Abdomen: soft, non-tender; bowel sounds normal; no masses,  no organomegaly  Extremities:  extremities normal, atraumatic, no cyanosis or edema  Musculokeletal: No joint swelling, no muscle tenderness. ROM normal in all joints of extremities.    Neurologic: Mental status: Alert, oriented, thought content appropriate    Pertinent Information & Imaging Studies, Consults   genesis  CBC:   Lab Results   Component Value Date/Time    WBC 3.1 07/25/2022 04:46 AM    RBC 3.13 07/25/2022 04:46 AM    HGB 8.8 07/25/2022 04:46 AM    HCT 29.3 07/25/2022 04:46 AM    MCV 93.6 07/25/2022 04:46 AM    MCH 28.1 07/25/2022 04:46 AM    MCHC 30.0 07/25/2022 04:46 AM    RDW 17.9 07/25/2022 04:46 AM     07/25/2022 04:46 AM    MPV 11.3 07/25/2022 04:46 AM     BMP:    Lab Results   Component Value Date/Time     07/25/2022 04:46 AM    K 4.0 07/25/2022 04:46 AM    K 5.0 07/06/2022 02:41 AM    CL 92 07/25/2022 04:46 AM    CO2 38 07/25/2022 04:46 AM    BUN 19 07/25/2022 04:46 AM    LABALBU 3.7 07/25/2022 04:46 AM    CREATININE 1.1 07/25/2022 04:46 AM    CALCIUM 9.3 07/25/2022 04:46 AM    GFRAA 59 07/25/2022 04:46 AM    LABGLOM 49 07/25/2022 04:46 AM    GLUCOSE 111 07/25/2022 04:46 AM       IMAGING:   Imaging Studies:    XR CHEST PORTABLE    Result Date: 7/22/2022  1. Mild basilar atelectasis. 2.  Diminished inspiratory effort and elevation the right hemidiaphragm similar to the prior examination. 3.  No significant pleural effusion is visible. 4.  No evidence of pneumothorax. Fluoroscopy modified barium swallow with video    Result Date: 7/21/2022  Impaired swallowing mechanism with moderate laryngeal penetration with thin liquid barium. No barium aspiration. Please see separate speech pathology report for full discussion of findings and recommendations. Notable Cultures:      Blood cultures   Blood Culture, Routine   Date Value Ref Range Status   07/05/2022 5 Days no growth  Final     Respiratory cultures No results found for: RESPCULTURE   Gram Stain Result   Date Value Ref Range Status   07/20/2022 Refer to ordered Gram stain for results  Final     Urine   Creatinine Ur POCT   Date Value Ref Range Status   06/17/2019 50 mg/dl  Final     Urine Culture, Routine   Date Value Ref Range Status   07/05/2022 Growth not present  Final     Legionella No results found for: LABLEGI  C Diff PCR No results found for: CDIFPCR  Wound culture/abscess: No results for input(s): WNDABS in the last 72 hours. Tip culture:No results for input(s): CXCATHTIP in the last 72 hours.      Antibiotic  Days  Day started                                    Resident's Assessment and Plan     Mikie Shaikh is a 66 y.o. female with  has a past medical history of A-fib ([facility] [address] Acute on chronic congestive heart failure ([facility] [address] Anxiety, Asthma, CAD (coronary artery disease), Cancer ([facility] [address] Chronic kidney disease, Depression, Diabetes mellitus ([facility] [address] H/O mammogram, Hx MRSA infection, Hyperlipidemia, Hypertension, Lateral epicondylitis, SERENA on CPAP, Parkinson's disease ([facility] [address] and Tubal ligation status. came here with CC   Chief Complaint   Patient presents with    Altered Mental Status     per ems family reports ams x 45 minutes, recent psych med changes and right toe amputation        SUMMARY OF HOSPITAL STAY: Matt Blanchard is a 66 y.o. female admitted for acute hypoxic hypercapnic resp failure and AMS likely 2/2 pleural effusion, possible HAP and acute decompensated HFpEF exacerbation (EF 70-75%), and septic shock likely 2/2 pneumonia, hypernatremia and lactic acidosis. She was intubated and put in the ICU and extubated after improvement on 7/10/22. Patient has completed a 10 day course of cefepime 2000 mg IV q12hr and vancomycin for possible HAP. Patient had an episode of black diarrhea and was FOBT positive on 7/16/22. Patient's Hb dropped to 6.7 which required a packed RBC on 7/17/22 which brought the Hb up to 8.3 GS performed EGS (7/19) showing moderate diffuse gastritis and duodenitis. Chest tube placed (7/8) to drain pleural effusion likely due to heart failure. Pleural fluid analysis indicates transudative effusion. Chest tube was removed (7/21). Patient has been practicing improving her swallowing with tongue protrusion exercises.      Consults:   Critical Care  Pulmonology  Palliative care  Cardiology  Nephrology  [address] [facility]    Neurology  Acute encephalopathy - resolved  Intubated in ICU d/t AMS  Extubated on 7/10/22  Plan:  Hold home depakote d/t it's association with encephalopathy and will assess prior to D/c   Hx of Parkinson  Stable  Plan:  Continue home carbidopa-levodopa  Hx of restless leg syndrome  Patient complaining of worsening during night time  Plan:  Continue home ropinirole 1mg TID  Hx of depression and anxiety  Plan:  Continue home quetiapine     Cardiovascular  Hx of permanent A-fib  DCCV in laryngeal penetration with thin liquid barium. No barium aspiration.    Currently on purred diet  Plan:  Instruct patient to continue improving her swallowing with tongue protrusion exercises per SLP recommendation      Renal  Metabolic alkalosis likely 2/2 diuresis - improving   Normal bicarb on admission  Plan:  Per nephro - start Bumex 1mg po MWF today after receiving diamox previously  SHANTANU likely 2/2 diuresis - improved  Cr improving  Per nephro rec - lasix was stopped for increasing creatinine   Plan:  Monitor BMP  Hypokalemia - resolved  Patient refused the klorcon and the liquid is not recommended with her dysphasia  Patient received 20 mEq IV   Plan:  Monitor BMP     Endocrinology  Hx of type 2 DM w/ neuropathyOn lantus 10 and Novolog SS at home  Patient was tried on 7 units glargine but had a BG of 95 so was switched back to 5 units  Plan:  Insulin glargine 5 units SQ nightly and insulin lispro 0-6 units SQ 4X daily AC and HS     Infectious disease  Septic shock likely 2/2 CAP - resolved  Lactic acidosis - resolved  Patient finished her 10 day course of vanc + cefepime      Heme-Onc  Acute on chronic anemia - stable  EGD (7/19/22) showed moderate diffuse gastritis and duodenitis with no bleeding  Previous EGD in 2015 showed mild gastritis  FOBT (+) on 7/16/22  Melena 7/15/22  Required 2nd transfusion of 1pRBC on 7/17 Hb 6.7 > 8.3 post and a previous transfusion on of 1 pRBC on 7/8  H pylori negative   Plan:  Continue oral PPI, continue carafate   Monitor for drop of Hb     Problems resolved during this admission:   Acute encephalopathy multifactorial 2/2 septic shock, digoxin toxicity, uremia  Acute hypoxic hypercapnic respiratory failure likely 2/2 to RIGHT pleural effusion, possible HAP vs acute decompensated HFpEF exacerbation (EF 70-75)  Shock likely septic, HAP, digoxin toxicity  HAGMA 2/2 lactic acidiosis (hypoperfusion, septic shock), uremia 2/2 SHANTANU  Elevated troponin  SHANTANU stage III on CKD  Metabolic alkalosis likely contraction alkalosis     PT/OT: Continue  DVT ppx: Eliquid  GI ppx: Protonix 40mg PO BID and carafate, purred diet with adult oral nutrition supplement         Next of Kin/ POA:  daughter     Code Status:   Full code      Disposition:Oswald haas restarted, may need oxygen on discharge       Erika Klein MD, PGY-1  Attending physician: Dr. Jared Galvez    Senior Resident Statement  I have seen and examined the patient with the intern. I have discussed the case, including pertinent history and exam findings with the intern. I agree with the assessment, plan and orders as documented by the intern.      Odalis Hartley MD PGY- 3  Electronically signed by Odalis Hartley MD on 7/25/2022 at 3:48 PM    Attending physician: Saw Snow

## 2022-07-25 NOTE — PROGRESS NOTES
[place]  Department of Internal Medicine  Division of Pulmonary, Critical Care and Sleep Medicine  Progress Note    Jamie Beckett DO, MPH, [facility], [facility], [address] [facility] Víctor CRABTREE MD      Patient: Lenny Dorsey  MRN: [id number]  : 1950    Encounter Time: 5:27 PM     Date of Admission: 2022 12:25 PM    Primary Care Physician: No primary care provider on file. Reason for Consultation: Drain CT management     SUBJECTIVE:    Feeling ok    OBJECTIVE:     PHYSICAL EXAM:   VITALS:   Vitals:    22 0615 22 0754 22 1509 22 1658   BP:  123/63 (!) 104/52    Pulse:  68 81    Resp:  16  20   Temp:  97.8 °F (36.6 °C)     TempSrc:  Oral     SpO2:  90% 100%    Weight: 210 lb 1.6 oz (95.3 kg)      Height:            Intake/Output Summary (Last 24 hours) at 2022 1727  Last data filed at 2022 0555  Gross per 24 hour   Intake 480 ml   Output 500 ml   Net -20 ml          CONSTITUTIONAL:    Alert  SKIN:     No rash,   HEENT:     EOMI, MMM, No thrush  NECK:    No bruits, No JVP appreciated  CV:      Sinus,  No murmur, No rubs, No gallops  PULMONARY:   Couse BS,  No Wheezing, No Rales, No Rhonchi      CT noted  ABDOMEN:     Soft, non-tender. BS normal. No R/R/G  EXT:    No deformities . No clubbing. Edema ower extremity edema, No venous stasis  PULSE:   Appears equal and palpable.   PSYCHIATRIC:  Seems appropriate, No acute psychosis  MS:    Gross weakness  NEUROLOGIC:   The clinical assessment is non-focal     DATA: IMAGING & TESTING:     LABORATORY TESTS:    CBC:   Lab Results   Component Value Date/Time    WBC 3.1 2022 04:46 AM    RBC 3.13 2022 04:46 AM    HGB 8.8 2022 04:46 AM    HCT 29.3 2022 04:46 AM    MCV 93.6 2022 04:46 AM    MCH 28.1 2022 04:46 AM    MCHC 30.0 2022 04:46 AM    RDW 17.9 2022 04:46 AM     2022 04:46 AM    MPV 11.3 2022 04:46 AM     CMP:    Lab Results   Component Value Date/Time     07/25/2022 04:46 AM    K 4.0 07/25/2022 04:46 AM    K 5.0 07/06/2022 02:41 AM    CL 92 07/25/2022 04:46 AM    CO2 38 07/25/2022 04:46 AM    BUN 19 07/25/2022 04:46 AM    CREATININE 1.1 07/25/2022 04:46 AM    GFRAA 59 07/25/2022 04:46 AM    LABGLOM 49 07/25/2022 04:46 AM    GLUCOSE 111 07/25/2022 04:46 AM    PROT 6.1 07/25/2022 04:46 AM    LABALBU 3.7 07/25/2022 04:46 AM    CALCIUM 9.3 07/25/2022 04:46 AM    BILITOT 0.8 07/25/2022 04:46 AM    ALKPHOS 83 07/25/2022 04:46 AM    AST 8 07/25/2022 04:46 AM    ALT <5 07/25/2022 04:46 AM     Calcium:    Lab Results   Component Value Date/Time    CALCIUM 9.3 07/25/2022 04:46 AM     Ionized Calcium:  No results found for: IONCA  Magnesium:    Lab Results   Component Value Date/Time    MG 2.0 07/25/2022 04:46 AM     Phosphorus:    Lab Results   Component Value Date/Time    PHOS 3.9 07/25/2022 04:46 AM     PT/INR:    Lab Results   Component Value Date/Time    PROTIME 20.9 07/21/2022 04:34 AM    INR 1.9 07/21/2022 04:34 AM        PRO-BNP:   Lab Results   Component Value Date    PROBNP 37,248 (H) 07/12/2022    PROBNP 41,368 (H) 07/06/2022      ABGs:   Lab Results   Component Value Date/Time    PH 7.452 07/11/2022 05:07 AM    PO2 133.6 07/11/2022 05:07 AM    PCO2 40.0 07/11/2022 05:07 AM     Hemoglobin A1C: No components found for: HGBA1C    IMAGING:  Imaging tests were completed and reviewed and discussed radiology and care team involved and reveals   Echo Complete    Result Date: 7/7/2022  Findings   Left Ventricle  Micro-bubble contrast injected to enhance left ventricular visualization. Normal left ventricular size and systolic function. Ejection fraction is visually estimated at 70-75%. Indeterminate diastolic function. No regional wall motion abnormalities seen. Mild left ventricular concentric hypertrophy noted. Right Ventricle  Moderately dilated right ventricle.   Right ventricle global systolic function is reduced. TAPSE 1.1 cm. Left Atrium  The left atrium is moderate-severely dilated. JUANY 48 ml/m2. The interatrial septum appears intact. Right Atrium  Mildly enlarged right atrium. Mitral Valve  Structurally normal mitral valve. No evidence of mitral valve stenosis. Physiologic mitral regurgitation. Tricuspid Valve  The tricuspid valve appears structurally normal.  Mild tricuspid regurgitation. RVSP is at least 60 mmHg. Aortic Valve  Individual aortic valve leaflets are not clearly visualized. No hemodynamically significant aortic stenosis is present. No evidence of aortic valve regurgitation. Pulmonic Valve  The pulmonic valve was not well visualized. No evidence of pulmonic valve stenosis. No evidence of any pulmonic regurgitation. Pericardial Effusion  Prominent pericardial fat pad. No definitive evidence of pericardial effusion. Aorta  Aortic root dimension within normal limits. Miscellaneous  Dilated Inferior Vena Cava. Conclusions   Summary  Technically difficult study - limited visualization. Micro-bubble contrast injected to enhance left ventricular visualization. Normal left ventricular size and systolic function. Ejection fraction is visually estimated at 70-75%. Indeterminate diastolic function. No regional wall motion abnormalities seen. Mild left ventricular concentric hypertrophy noted. Moderately dilated right ventricle with reduced function. Biatrial dilation. Mild tricuspid regurgitation. RVSP is at least 60 mmHg. Prominent pericardial fat pad. No definitive evidence of pericardial effusion. Result Date: 7/5/2022  FINDINGS: Lower Chest: See the CT report of the chest dated the same date for full details. Organs: The liver is grossly unremarkable. The spleen is unremarkable. Gallbladder is been surgically removed. The pancreas reveals some mild fatty replacement. Both adrenal glands are within normal limits.   The kidneys are unremarkable in appearance. GI/Bowel: Stomach is within normal limits. No mucosal abnormality. Stool seen scattered diffusely throughout the colon. There is no wall thickening. No mucosal abnormality or distension of the small bowel. Pelvis: Pelvic organs reveal mild wall thickening of the bladder with incomplete distension. The uterus is grossly unremarkable. Peritoneum/Retroperitoneum: There is no abdominal retroperitoneal lymphadenopathy. There is mild stranding identified in the root of the mesentery as well as surrounding the pancreas in which mild inflammation of the pancreas cannot be completely excluded. Correlation to clinical lab values is recommended. No pelvic adenopathy with moderate atherosclerotic disease seen within the abdominal aorta and iliac vessels. Free fluid identified throughout the upper abdomen. Extensive vascular calcifications seen within the mesenteric vessels as well as the abdominal aorta. Bones/Soft Tissues: The bony structures reveal extensive degenerative changes seen within the spine and pelvis. Severe degenerative changes seen within the sacroiliac joints bilaterally right greater than left some sclerosis on the right to suggest prior healed sacroiliitis. There is extensive anasarca seen within the subcutaneous tissues. Increasing fluid within the abdomen when compared to the prior study. The anasarca is also increased in appearance of the prior examination. Mild stranding seen around the mesentery which correlation to clinical lab values is recommended to exclude possible pancreatitis or volume overload. Overall the appearance of the pancreas itself is grossly unremarkable. There is only mild stranding seen throughout the mesenteric root. CT HEAD WO CONTRAST  Result Date: 7/5/2022  No acute intracranial abnormality or significant change in the overall appearance of the brain.  MRI would be useful if symptoms persist. RECOMMENDATIONS: Unavailable     CT CHEST WO second  of  fluoroscopy. The patient received local anesthesia. The patient was monitored for 60 minutes by a registered nurse. Successful uncomplicated  right chest tube placement Recommendations: 1. Follow-up tube check in 2 weeks 2. Flush tube daily with 5 to 10 mL of sterile saline     MRI BRAIN WO CONTRAST  Result Date: 7/8/2022  FINDINGS: INTRACRANIAL STRUCTURES/VENTRICLES: There is no acute infarct. No mass effect, edema or hemorrhage is seen. Mild volume loss is seen in the cerebrum with mild chronic microvascular ischemic changes. No hydrocephalus or extra-axial fluid is seen. ORBITS: Prosthetic lenses are seen in the globes bilaterally. The orbits are otherwise grossly unremarkable. SINUSES: Mild mucosal thickening in the paranasal sinuses. There is pooling of secretions in the nasopharynx. Moderate bilateral mastoid effusions. BONES/SOFT TISSUES: The bone marrow signal intensity appears normal. The soft tissues demonstrate no acute abnormality. 1.  No acute intracranial abnormality. 2. No gross epileptogenic lesion is identified. 3. Bilateral mastoid effusions, which may be due to eustachian tube dysfunction or otomastoiditis. RECOMMENDATIONS: Unavailable     US DUP LOWER EXTREMITIES BILATERAL VENOUS  Within the visualized vessels there is no evidence for deep venous thrombosis       Assessment: 1. Acute hypoxic hypercapnic respiratory failure 2/2 mod R pleural effusion, possible HAP vs acute decompensated  HFpEF exacerbation (EF 70-75%)  2. Pulmonary hypertension likely Type II 2/2 acute decompensated diastolic heart failure, in the setting of Hx SERENA  3. Moderate R pleural effusion, likely 2/2 HAP (Klebsiella pneumoniae) vs acute decompensated heart failure. D4 CT, 250 cc output in 24 hrs  4. HAP (Klebsiella pneumoniae on resp culture, light growth). D1 cefepime  5. Elevated pro-BNP, multifactorial, likely acute decompensated HFpEF, septic shock.    6. Elevated troponin likely 2/2 demand ischemia  7. Permanent atrial fibrillation, s/p DCCV (April and May 2022), now rate controlled. 8. SHANTANU Stage III on CKD multifactorial, pre-renal (hemodynamically mediated, DHN 2/2 diuretic use, poor oral intake); vs ATN 2/2 septic shock. 9. Hypernatremia likely 2/2 over diuresis  10. Metabolic alkalosis likely contraction alkalosis 2/2 diuresis, poor oral intake  11. Pancytopenia likely 2/2 BM suppression 2/2 chronic illness, meds (Zosyn)  12. Acute on chronic macrocytic anemia, multifactorial, likely 2/2 chronic disease, blood draws,  Less likely hemolysis (no schistocytes, haptoglobin wnl) s/p 1 unit PRBC 7/11, FOBT positive today, with pinkish tinge on chest tube output  13. Thrombocytopenia,likely 2/2 #11, less likely HIT (not exposed to heparin products), DIC, hemolysis ruled out (work-up negative)  14. Prolonged INR, ? HFP wnl, no overt bleeding, apixaban and aspirin on hold. Given Vit K yesterday for INR 3.2>2 today  15. Low grade fever, multifactorial, ? meds (Precedex?), CLABSI?, no new leukocytosis, or hemodynamic instability   16. Hx of asthma  17. Hx of SERENA. Not using CPAP at home  18. Hx of HTN. -160`s  19. Hx of CAD  20. Hx of Type 2 DM with neuropathy. On Lantus 10 and Novolog SS at home  21. Hx of depression/anxiety. 22. Hx of restless leg syndrome  23. Hx of Parkinson's disease.  On Sinemet and ropinirole at home, which was discontinued upon discharge at [address]:   Wean oxygen  Placement pending      Patricia Davis DO DO, MPH, [facility]  Professor of Internal Medicine  Pulmonary, Critical Care and Sleep Medicine

## 2022-07-25 NOTE — CARE COORDINATION
Social Work/Case Management Transition of Care Planning ([facility] [phone]):  Naz Belle from [facility] stated they will submitted for new pre-cert today. Met with patient at bedside and she is in agreement with the plan. Discharge to [address] when [employer] is received.  Leilani Padilla, [facility]  7/25/2022

## 2022-07-26 LAB
ALBUMIN SERPL-MCNC: 4.3 G/DL (ref 3.5–5.2)
ALP BLD-CCNC: 89 U/L (ref 35–104)
ALT SERPL-CCNC: <5 U/L (ref 0–32)
ANION GAP SERPL CALCULATED.3IONS-SCNC: 9 MMOL/L (ref 7–16)
AST SERPL-CCNC: 8 U/L (ref 0–31)
BILIRUB SERPL-MCNC: 0.9 MG/DL (ref 0–1.2)
BUN BLDV-MCNC: 18 MG/DL (ref 6–23)
CALCIUM SERPL-MCNC: 9.4 MG/DL (ref 8.6–10.2)
CHLORIDE BLD-SCNC: 92 MMOL/L (ref 98–107)
CO2: 38 MMOL/L (ref 22–29)
CREAT SERPL-MCNC: 1 MG/DL (ref 0.5–1)
GFR AFRICAN AMERICAN: >60
GFR NON-AFRICAN AMERICAN: 54 ML/MIN/1.73
GLUCOSE BLD-MCNC: 127 MG/DL (ref 74–99)
MAGNESIUM: 1.9 MG/DL (ref 1.6–2.6)
METER GLUCOSE: 133 MG/DL (ref 74–99)
METER GLUCOSE: 178 MG/DL (ref 74–99)
METER GLUCOSE: 179 MG/DL (ref 74–99)
METER GLUCOSE: 194 MG/DL (ref 74–99)
PHOSPHORUS: 4.6 MG/DL (ref 2.5–4.5)
POTASSIUM SERPL-SCNC: 3.9 MMOL/L (ref 3.5–5)
SODIUM BLD-SCNC: 139 MMOL/L (ref 132–146)
TOTAL PROTEIN: 6.4 G/DL (ref 6.4–8.3)

## 2022-07-26 PROCEDURE — 6370000000 HC RX 637 (ALT 250 FOR IP): Performed by: INTERNAL MEDICINE

## 2022-07-26 PROCEDURE — 6360000002 HC RX W HCPCS: Performed by: SURGERY

## 2022-07-26 PROCEDURE — 6370000000 HC RX 637 (ALT 250 FOR IP): Performed by: SURGERY

## 2022-07-26 PROCEDURE — S5553 INSULIN LONG ACTING 5 U: HCPCS | Performed by: STUDENT IN AN ORGANIZED HEALTH CARE EDUCATION/TRAINING PROGRAM

## 2022-07-26 PROCEDURE — 2580000003 HC RX 258: Performed by: SURGERY

## 2022-07-26 PROCEDURE — 1200000000 HC SEMI PRIVATE

## 2022-07-26 PROCEDURE — 84100 ASSAY OF PHOSPHORUS: CPT

## 2022-07-26 PROCEDURE — 36415 COLL VENOUS BLD VENIPUNCTURE: CPT

## 2022-07-26 PROCEDURE — 80053 COMPREHEN METABOLIC PANEL: CPT

## 2022-07-26 PROCEDURE — 6360000002 HC RX W HCPCS: Performed by: STUDENT IN AN ORGANIZED HEALTH CARE EDUCATION/TRAINING PROGRAM

## 2022-07-26 PROCEDURE — 82962 GLUCOSE BLOOD TEST: CPT

## 2022-07-26 PROCEDURE — 92526 ORAL FUNCTION THERAPY: CPT

## 2022-07-26 PROCEDURE — 99232 SBSQ HOSP IP/OBS MODERATE 35: CPT | Performed by: INTERNAL MEDICINE

## 2022-07-26 PROCEDURE — 2700000000 HC OXYGEN THERAPY PER DAY

## 2022-07-26 PROCEDURE — 94640 AIRWAY INHALATION TREATMENT: CPT

## 2022-07-26 PROCEDURE — 83735 ASSAY OF MAGNESIUM: CPT

## 2022-07-26 PROCEDURE — 6370000000 HC RX 637 (ALT 250 FOR IP): Performed by: STUDENT IN AN ORGANIZED HEALTH CARE EDUCATION/TRAINING PROGRAM

## 2022-07-26 RX ORDER — MAGNESIUM SULFATE 1 G/100ML
1000 INJECTION INTRAVENOUS ONCE
Status: COMPLETED | OUTPATIENT
Start: 2022-07-26 | End: 2022-07-26

## 2022-07-26 RX ADMIN — SODIUM CHLORIDE, PRESERVATIVE FREE 10 ML: 5 INJECTION INTRAVENOUS at 08:52

## 2022-07-26 RX ADMIN — IPRATROPIUM BROMIDE AND ALBUTEROL SULFATE 1 AMPULE: .5; 2.5 SOLUTION RESPIRATORY (INHALATION) at 19:59

## 2022-07-26 RX ADMIN — SUCRALFATE 1 G: 1 TABLET ORAL at 00:34

## 2022-07-26 RX ADMIN — CARBIDOPA AND LEVODOPA 2 TABLET: 25; 100 TABLET, EXTENDED RELEASE ORAL at 08:51

## 2022-07-26 RX ADMIN — SUCRALFATE 1 G: 1 TABLET ORAL at 06:33

## 2022-07-26 RX ADMIN — INSULIN GLARGINE-YFGN 5 UNITS: 100 INJECTION, SOLUTION SUBCUTANEOUS at 20:35

## 2022-07-26 RX ADMIN — QUETIAPINE FUMARATE 25 MG: 25 TABLET ORAL at 20:31

## 2022-07-26 RX ADMIN — AMIODARONE HYDROCHLORIDE 200 MG: 200 TABLET ORAL at 08:51

## 2022-07-26 RX ADMIN — CARBIDOPA AND LEVODOPA 2 TABLET: 25; 100 TABLET, EXTENDED RELEASE ORAL at 20:32

## 2022-07-26 RX ADMIN — ROPINIROLE HYDROCHLORIDE 1 MG: 1 TABLET, FILM COATED ORAL at 08:51

## 2022-07-26 RX ADMIN — BUDESONIDE 500 MCG: 0.5 SUSPENSION RESPIRATORY (INHALATION) at 20:00

## 2022-07-26 RX ADMIN — ROPINIROLE HYDROCHLORIDE 1 MG: 1 TABLET, FILM COATED ORAL at 20:32

## 2022-07-26 RX ADMIN — SUCRALFATE 1 G: 1 TABLET ORAL at 18:34

## 2022-07-26 RX ADMIN — ATORVASTATIN CALCIUM 20 MG: 20 TABLET, FILM COATED ORAL at 08:50

## 2022-07-26 RX ADMIN — IPRATROPIUM BROMIDE AND ALBUTEROL SULFATE 1 AMPULE: .5; 2.5 SOLUTION RESPIRATORY (INHALATION) at 12:47

## 2022-07-26 RX ADMIN — INSULIN LISPRO 1 UNITS: 100 INJECTION, SOLUTION INTRAVENOUS; SUBCUTANEOUS at 18:34

## 2022-07-26 RX ADMIN — QUETIAPINE FUMARATE 25 MG: 25 TABLET ORAL at 08:51

## 2022-07-26 RX ADMIN — ROPINIROLE HYDROCHLORIDE 1 MG: 1 TABLET, FILM COATED ORAL at 14:51

## 2022-07-26 RX ADMIN — INSULIN LISPRO 1 UNITS: 100 INJECTION, SOLUTION INTRAVENOUS; SUBCUTANEOUS at 20:35

## 2022-07-26 RX ADMIN — METOPROLOL TARTRATE 12.5 MG: 25 TABLET, FILM COATED ORAL at 20:31

## 2022-07-26 RX ADMIN — POLYETHYLENE GLYCOL 3350 17 G: 17 POWDER, FOR SOLUTION ORAL at 20:33

## 2022-07-26 RX ADMIN — IPRATROPIUM BROMIDE AND ALBUTEROL SULFATE 1 AMPULE: .5; 2.5 SOLUTION RESPIRATORY (INHALATION) at 08:31

## 2022-07-26 RX ADMIN — ASPIRIN 81 MG CHEWABLE TABLET 81 MG: 81 TABLET CHEWABLE at 08:50

## 2022-07-26 RX ADMIN — APIXABAN 5 MG: 5 TABLET, FILM COATED ORAL at 08:51

## 2022-07-26 RX ADMIN — Medication 5 MG: at 20:31

## 2022-07-26 RX ADMIN — APIXABAN 5 MG: 5 TABLET, FILM COATED ORAL at 20:31

## 2022-07-26 RX ADMIN — METOPROLOL TARTRATE 12.5 MG: 25 TABLET, FILM COATED ORAL at 08:50

## 2022-07-26 RX ADMIN — INSULIN LISPRO 1 UNITS: 100 INJECTION, SOLUTION INTRAVENOUS; SUBCUTANEOUS at 14:40

## 2022-07-26 RX ADMIN — SUCRALFATE 1 G: 1 TABLET ORAL at 14:40

## 2022-07-26 RX ADMIN — PANTOPRAZOLE SODIUM 40 MG: 40 TABLET, DELAYED RELEASE ORAL at 18:33

## 2022-07-26 RX ADMIN — ANORECTAL OINTMENT: 15.7; .44; 24; 20.6 OINTMENT TOPICAL at 20:34

## 2022-07-26 RX ADMIN — PANTOPRAZOLE SODIUM 40 MG: 40 TABLET, DELAYED RELEASE ORAL at 06:33

## 2022-07-26 RX ADMIN — MAGNESIUM SULFATE HEPTAHYDRATE 1000 MG: 1 INJECTION, SOLUTION INTRAVENOUS at 09:05

## 2022-07-26 RX ADMIN — BUDESONIDE 500 MCG: 0.5 SUSPENSION RESPIRATORY (INHALATION) at 08:31

## 2022-07-26 RX ADMIN — CARBIDOPA AND LEVODOPA 2 TABLET: 25; 100 TABLET, EXTENDED RELEASE ORAL at 14:51

## 2022-07-26 RX ADMIN — ANORECTAL OINTMENT: 15.7; .44; 24; 20.6 OINTMENT TOPICAL at 08:57

## 2022-07-26 ASSESSMENT — PAIN SCALES - GENERAL: PAINLEVEL_OUTOF10: 0

## 2022-07-26 NOTE — PROGRESS NOTES
[address]  Discharge Summary    PCP: No primary care provider on file. Admit Date:7/5/2022  Discharge Date: 7/27/2022    Chief Complaint   Patient presents with    Altered Mental Status     per ems family reports ams x 45 minutes, recent psych med changes and right toe amputation       Admission Diagnosis:   Acute encephalopathy  Acute hypoxic hypercapnic respiratory failure   Septic shock  HAGMA  Elevated troponin   Digoxin toxicity  Hx Parkinson  Hx restless leg syndrome  Hx depression and anxiety  Hx permanent A-fib  Hx HTN  Hx HFpEF  Hx CAD  Hx HLD  Anasarca   Pleural effusion   Hx asthma  Hx SERENA  Acute pancreatitis   SHANTANU stage III on CKD   Hyperkalemia     Discharge Diagnosis:  Acute encephalopathy - resolved  Acute hypoxic hypercapnic respiratory failure - improved   Septic shock - resolved  HAGMA - resolved  Elevated troponin d/t demand ischemia   Digoxin toxicity - resolved  Hx Parkinson - stable   Hx restless leg syndrome - stable  Hx depression and anxiety - stable  Hx permanent A-fib - stable   Hx HTN - stable   Hx HFpEF - resolved   Hx CAD - stable  Hx HLD   Anasarca - improving   Pleural effusion - improving   Hx asthma - stable  Hx SERENA   Acute pancreatitis - resolved  Oropharyngeal dysphagia - improving  SHANTANU stage III on CKD - resolved  Metabolic alkalosis - resolved   Hyperkalemia - resolved  Acute on chronic anemia - stable   Candidal intertrigo   Hypokalemia - resolved    Hospital Course:   Briefly, Leslee Flores is a 65 y/o female who was admitted for acute hypoxic hypercapnic resp failure and altered mental status likely 2/2 pleural effusion, possible HPA and acute decompensated HFpEF exacerbation (EF 70-75%), and septic shock likely secondary to pneumonia, hypernatremia and lactic acidosis. She was intubated and put in the ICU and extubated after improvement on 7/10/2022.      Patient has completed a 10-day course of cefepime 2000mg IV q12hr and vancomycin for possible HAP. On admission patient had toxic levels of digoxin and she received digibind, she will not continue digoxin on discharge. Patient had an episode of melena and was FOBT positive. Patient Hb dropped to 6.7 which required a packed RBC on 7/17. GS performed EGS (7/19) showing moderate diffuse gastritis and duodenitis with a negative H. Pylori serum test. The patient's Hb has been stable since. She will continue to take PPI and carafate on discharge. A chest tube was placed 7/8 and removed on the 7/21 to drain a pleural effusion likely due to heart failure. Pleural fluid analysis indicated transudative effusion. Patient has been discharged on Bumex 1mg PO MWF. Patient has candidal intertrigo under her breasts, pannus and bilateral inguinal areas and has been given powder miconazole to manage it. Patient develop dysphagia and has been improving to where she is now capable of eating soft foods with regular liquids but no straw. On discharge continue glargine 5 units SQ nightly and insulin lispro SQ 4X daily AC and HS. Continue to watch her blood glucose as it could use adjusting when her diet gets more regular. The patient had episodes of mild hypotension but is stable on current meds now and should continue taking the reduced metoprolol tartrate 12.5mg. For the permanent A-fib patient should continue Eliquis 5mg BID and amiodarone 200mg. Patient should also continue ASA 81mg for the history of CAD. On day of discharge :   Subjective:  Patient is a overall very happy today because she finally got a non-pureed or liquid diet last night. She denies any fever or chills. Chest pain or shortness of breath. Patient denies any abdominal pain, no nausea, diarrhea or constipation. She is motivated to keep practicing her laryngeal strengthening exercises so she can keep eating the foods she wants.      Review of Systems   Constitutional:  Negative for appetite change, chills, tablet by mouth in the morning and 1 tablet before bedtime.   Qty: 60 tablet, Refills: 3           CONTINUE these medications which have NOT CHANGED    Details   aspirin EC 81 MG EC tablet Take 1 tablet by mouth daily  Qty: 30 tablet, Refills: 0      montelukast (SINGULAIR) 10 MG tablet Take 10 mg by mouth nightly      atorvastatin (LIPITOR) 20 MG tablet Take 1 tablet by mouth daily  Qty: 90 tablet, Refills: 3    Comments: **Patient requests 90 days supply**      insulin aspart (NOVOLOG FLEXPEN) 100 UNIT/ML injection pen INJECT 0-10 UNITS INTO THE SKIN THREE TIMES DAILY(BEFORE MEALS) SLIDING SCALE (MAX DAILY 30 UNITS)  Qty: 5 pen, Refills: 3           STOP taking these medications       divalproex (DEPAKOTE) 250 MG DR tablet Comments:   Reason for Stopping:         ferrous sulfate (IRON 325) 325 (65 Fe) MG tablet Comments:   Reason for Stopping:         linezolid (ZYVOX) 600 MG tablet Comments:   Reason for Stopping:         cephALEXin (KEFLEX) 500 MG capsule Comments:   Reason for Stopping:         furosemide (LASIX) 20 MG tablet Comments:   Reason for Stopping:         digoxin (LANOXIN) 125 MCG tablet Comments:   Reason for Stopping:         venlafaxine (EFFEXOR XR) 75 MG extended release capsule Comments:   Reason for Stopping:         melatonin 3 MG TABS tablet Comments:   Reason for Stopping:         insulin glargine (LANTUS) 100 UNIT/ML injection vial Comments:   Reason for Stopping:         lidocaine 4 % external patch Comments:   Reason for Stopping:         budesonide-formoterol (SYMBICORT) 160-4.5 MCG/ACT AERO Comments:   Reason for Stopping:         albuterol sulfate HFA (PROVENTIL HFA) 108 (90 Base) MCG/ACT inhaler Comments:   Reason for Stopping:         carbidopa-levodopa (SINEMET CR)  MG per extended release tablet Comments:   Reason for Stopping:         nitroGLYCERIN (NITROSTAT) 0.4 MG SL tablet Comments:   Reason for Stopping:         busPIRone (BUSPAR) 5 MG tablet Comments:   Reason for Stopping:         Cholecalciferol (VITAMIN D) 25 MCG TABS Comments:   Reason for Stopping:         omeprazole (PRILOSEC) 40 MG delayed release capsule Comments:   Reason for Stopping:         blood glucose test strips (ACCU-CHEK RIGOBERTO PLUS) strip Comments:   Reason for Stopping:         lamoTRIgine (LAMICTAL) 200 MG tablet Comments:   Reason for Stopping:               My Personal Risk Factors  Anyone can get sepsis, but some people are at higher risk than others. These risk factors include: Over the age of 72, Immunocompromised (history of cancer/taking immunosuppressants), Chronic illness (diabetes, COPD, CHF, chronic renal failure,liver disease), Trauma/burns/open wounds and History of infection or sepsis  My Prevention Measures  The best way to prevent sepsis is to prevent infections. You can lower your risk by:  Getting vaccinations and immunizations, Practicing good hand hygiene. Use soap and water or hand  especially before handling catheters, Finishing all your antibiotics if you have an infection, Calling your doctor if you have signs and symptoms of an infection, Controlling your blood sugar, Taking all of your medications as prescribed and For females, wiping from front to back after voiding    Gastritis: Care Instructions  Your Care Instructions     Gastritis is a sore and upset stomach. It happens when something irritates the stomach lining. Many things can cause it. These include an infection such as the flu or something you ate or drank. Medicines or a sore on the lining of the stomach (ulcer) also can cause it. Your belly may bloat and ache. You maybelch, vomit, and feel sick to your stomach. You should be able to relieve the problem by taking medicine. And it may helpto change your diet. If gastritis lasts, your doctor may prescribe medicine. Follow-up care is a key part of your treatment and safety.  Be sure to make and go to all appointments, and call your doctor if you are having problems. It's also a good idea to know your test results and keep alist of the medicines you take. How can you care for yourself at home? If your doctor prescribed antibiotics, take them as directed. Do not stop taking them just because you feel better. You need to take the full course of antibiotics. Be safe with medicines. If your doctor prescribed medicine to decrease stomach acid, take it as directed. Call your doctor if you think you are having a problem with your medicine. Do not take any other medicine, including over-the-counter pain relievers, without talking to your doctor first.  If your doctor recommends over-the-counter medicine to reduce stomach acid, such as Pepcid AC (famotidine), Prilosec (omeprazole), or Tagamet HB (cimetidine) follow the directions on the label. Drink plenty of fluids to prevent dehydration. Choose water and other clear liquids. If you have kidney, heart, or liver disease and have to limit fluids, talk with your doctor before you increase the amount of fluids you drink. Avoid foods that make your symptoms worse. These may include chocolate, mint, alcohol, pepper, spicy foods, high-fat foods, or drinks with caffeine in them, such as tea, coffee, nathan, or energy drinks. If your symptoms are worse after you eat a certain food, you may want to stop eating it to see if your symptoms get better. When should you call for help? Call 911 anytime you think you may need emergency care. For example, call if:    You vomit blood or what looks like coffee grounds. You pass maroon or very bloody stools. Call your doctor now or seek immediate medical care if:    You start breathing fast and have not produced urine in the last 8 hours. You cannot keep fluids down. Watch closely for changes in your health, and be sure to contact your doctor if:    You do not get better as expected. Where can you learn more? Go to [email or web address] and sign in to your [employer] account. Enter [id number] in the [facility] box to learn more about \"Gastritis: Care Instructions. \"     If you do not have an account, please click on the \"Sign Up Now\" link. Current as of: September 8, 2021               Content Version: 13.3  © 3260-3385 Healthwise, [employer]. Care instructions adapted under license by [facility] ([facility]). If you have questions about a medical condition or this instruction, always ask your healthcare professional. [place] [address] any warranty or liability for your use of this information. [facility] Internal Medicine Resident Service    Activity as tolerated  Diet: diabetic  Be compliant with your medications and take them as prescribed. Hospital Follow up: [address] [address] with House Team   Call to confirm appointment Tel: [phone] ([address]  Internal Medicine Clinic)     Other Follow-Ups:    Future Appointments   Date Time Provider Prateek Nassar   8/16/2022  3:20 PM RASHARD Moerland - CNP [facility]       Other than any new prescriptions given to you today, the list of home going meds on this After Visit Summary are based on information provided to us from you. This information, including the list, dose, and frequency of medications is only as accurate as the information you provided. If you have any questions or concerns about your home medications, please contact your Primary Care Physician for further clarification.     Rebeca Perez MD  PGY 1   2:47 PM 7/27/2022

## 2022-07-26 NOTE — PROGRESS NOTES
[facility]  Internal Medicine Residency Program  Progress Note - House Team     Patient:  Gayatri Avina 66 y.o. female MRN: [id number]     Date of Service: 7/26/2022     CC: AMS     Days since admission: 07/05/2022     Subjective     Overnight events: Overnight event is significant for soft /52 mmHg. This morning patient was seen and examined at bedside this morning. The only complaint that the patient has is that she wants to resume her regular diet. She denies chest pain, palpitations, shortness of breath, cough, abdominal pain, headache, diarrhea, vomiting and nausea. Objective     Physical Exam:  Vitals: /81   Pulse 88   Temp 97.7 °F (36.5 °C) (Temporal)   Resp 18   Ht 5' (1.524 m)   Wt 206 lb 3.2 oz (93.5 kg)   SpO2 99%   BMI 40.27 kg/m²     I & O - 24hr:   Intake/Output Summary (Last 24 hours) at 7/26/2022 1050  Last data filed at 7/26/2022 0069  Gross per 24 hour   Intake 240 ml   Output 1600 ml   Net -1360 ml      General Appearance: alert, appears stated age, and cooperative  HEENT:  Head: Normocephalic, no lesions, without obvious abnormality. Head: Normal, normocephalic, atraumatic. Neck / Thyroid: Supple, no masses, nodes, nodules or enlargement. Neck: no adenopathy, no carotid bruit, no JVD, supple, symmetrical, trachea midline, and thyroid not enlarged, symmetric, no tenderness/mass/nodules  Lung: clear to auscultation bilaterally  Heart: regular rate and rhythm, S1, S2 normal, no murmur, click, rub or gallop  Abdomen: soft, non-tender; bowel sounds normal; no masses,  no organomegaly  Extremities:  extremities normal, atraumatic, no cyanosis or edema   Musculokeletal: No joint swelling, no muscle tenderness. ROM normal in all joints of extremities.    Neurologic: Mental status: Alert, oriented, thought content appropriate, alertness:   Subject  Pertinent Information & Imaging Studies, Consults   genesis  CBC with Differential:    Lab Results Component Value Date/Time    WBC 3.1 07/25/2022 04:46 AM    RBC 3.13 07/25/2022 04:46 AM    HGB 8.8 07/25/2022 04:46 AM    HCT 29.3 07/25/2022 04:46 AM     07/25/2022 04:46 AM    MCV 93.6 07/25/2022 04:46 AM    MCH 28.1 07/25/2022 04:46 AM    MCHC 30.0 07/25/2022 04:46 AM    RDW 17.9 07/25/2022 04:46 AM    NRBC 0.0 03/15/2019 09:10 AM    SEGSPCT 74 08/20/2013 10:45 AM    METASPCT 0.9 07/14/2022 05:31 AM    LYMPHOPCT 26.8 07/25/2022 04:46 AM    MONOPCT 7.7 07/25/2022 04:46 AM    MYELOPCT 2.6 07/14/2022 05:31 AM    EOSPCT 1 06/16/2017 12:00 AM    BASOPCT 0.6 07/25/2022 04:46 AM    MONOSABS 0.24 07/25/2022 04:46 AM    LYMPHSABS 0.84 07/25/2022 04:46 AM    EOSABS 0.08 07/25/2022 04:46 AM    BASOSABS 0.02 07/25/2022 04:46 AM     BMP:    Lab Results   Component Value Date/Time     07/26/2022 05:12 AM    K 3.9 07/26/2022 05:12 AM    K 5.0 07/06/2022 02:41 AM    CL 92 07/26/2022 05:12 AM    CO2 38 07/26/2022 05:12 AM    BUN 18 07/26/2022 05:12 AM    LABALBU 4.3 07/26/2022 05:12 AM    CREATININE 1.0 07/26/2022 05:12 AM    CALCIUM 9.4 07/26/2022 05:12 AM    GFRAA >60 07/26/2022 05:12 AM    LABGLOM 54 07/26/2022 05:12 AM    GLUCOSE 127 07/26/2022 05:12 AM     BUN/Creatinine:    Lab Results   Component Value Date/Time    BUN 18 07/26/2022 05:12 AM    CREATININE 1.0 07/26/2022 05:12 AM     Magnesium:    Lab Results   Component Value Date/Time    MG 1.9 07/26/2022 05:12 AM     Phosphorus:    Lab Results   Component Value Date/Time    PHOS 4.6 07/26/2022 05:12 AM     TSH:    Lab Results   Component Value Date/Time    TSH 4.580 07/06/2022 02:41 AM     VITAMIN B12: No components found for: B12  FOLATE:    Lab Results   Component Value Date/Time    FOLATE 5.8 07/06/2022 03:08 AM       IMAGING:   Imaging Studies:    XR CHEST PORTABLE    Result Date: 7/22/2022  1. Mild basilar atelectasis. 2.  Diminished inspiratory effort and elevation the right hemidiaphragm similar to the prior examination.  3.  No significant pleural effusion is visible. 4.  No evidence of pneumothorax. Fluoroscopy modified barium swallow with video    Result Date: 7/21/2022  Impaired swallowing mechanism with moderate laryngeal penetration with thin liquid barium. No barium aspiration. Please see separate speech pathology report for full discussion of findings and recommendations. PROCEDURES: None       Notable Cultures:      Blood cultures   Blood Culture, Routine   Date Value Ref Range Status   07/05/2022 5 Days no growth  Final     Respiratory cultures No results found for: RESPCULTURE   Gram Stain Result   Date Value Ref Range Status   07/20/2022 Refer to ordered Gram stain for results  Final     Urine   Creatinine Ur POCT   Date Value Ref Range Status   06/17/2019 50 mg/dl  Final     Urine Culture, Routine   Date Value Ref Range Status   07/05/2022 Growth not present  Final     Legionella No results found for: LABLEGI  C Diff PCR No results found for: CDIFPCR  Wound culture/abscess: No results for input(s): WNDABS in the last 72 hours. Tip culture:No results for input(s): CXCATHTIP in the last 72 hours. Antibiotic  Days  Day started                                  OXYGENATION: 2L Nasal Cannula     DIET: Minced diet and pureed         Resident's Assessment and Plan     Matt Blanchard is a 66 y.o. female with  has a past medical history of A-fib ([address] Acute on chronic congestive heart failure ([address] Anxiety, Asthma, CAD (coronary artery disease), Cancer ([address] Chronic kidney disease, Depression, Diabetes mellitus ([address] H/O mammogram, Hx MRSA infection, Hyperlipidemia, Hypertension, Lateral epicondylitis, SERENA on CPAP, Parkinson's disease ([address] and Tubal ligation status.   came here with CC   Chief Complaint   Patient presents with    Altered Mental Status     per ems family reports ams x 45 minutes, recent psych med changes and right toe amputation        SUMMARY OF HOSPITAL STAY: Briefly, Matt Blanchard is a 65 y/o female who was admitted for acute hypoxic hypercapnic resp failure and AMS likely 2/2 pleural effusion, possible HPA and acute decompensated HFpEF exacerbation (EF 70-75%), and septic shock likely secondary to pneumonia, hypernatremia and lactic acidosis. She was intubated and put in the ICU and extubated after improvement on 7/10/2022. Patient has completed a 10-day course of cefepime 2000mg IV q12hr and vancomycin for possible HAP. Patient had an episode of melena and was FOBT positive on 7/16/2022. Patient Hb dropped to 6.7 which required a packed RBC on 7/17/2022 which brought Hb up to 8.3 GS performed EGS (7/19) showing moderate diffuse gastritis and duodenitis. Chest tube placed 7/8 to drain pleural effusion likely due to heart failure. Pleural fluid analysis indicated transudative effusion. Chest tube was removed on 7/21/2022. Patient has been practicing improving her swallowing with tongue protrusion exercises. She wants to resume her regular diet and does not like the protrusion tongue exercises. Days since admission: 21    Consults:   Critical care   Pulmonology   Palliative   Cardiology   Nephro  GS     Assessment:     Acute encephalopathy- resolved  Intubated in ICU D/T AMS  Extubated on 7/10/2022  Plan  Hold Depakote D/T its association with encephalopathy and will assess prior to discharge  History of Parkinson Syndrome  Stable  Plan  Continue home carbidopa-levodopa  History of restless leg syndrome  Patient complaining of worsening during that time  Plan  Continue home ropinirole 1 mg 3 times daily  History of depression and anxiety      Plan        Continue home quetiapine  5. History of HLD     Plan    Continue atorvastatin 20 mg    6. Anasarca likely secondary to acute decompensated diastolic heart    failure improving     History of heart failure with preserved ejection fraction 7075 mild tricuspid     regurgitation last echo done/7/2022          Per nephro Bumex 1 mg p.o. MWF      7.  Hx of HTN      Due to episodes of hypotension pt BP med was reduced from metoprolol 25  to 12.5 mg oral BID      Plan     Watch for BP   8. Hx of CAD     Plan      Continue ASA      9. Moderate right pleural effusion likely secondary to acute decompensated heart failure - RESOLVED        Transudative pleural effusion on 7/8/22        Chest tube placed on 7/8 chest tube removed by pulmonology        Chest x-ray on 7/20/22 mild basilar atelectasis, no significant pleural effusion, no PTX, diminished inspiratory effort and elevation of right hemidiaphragm        Plan monitor respiratory status wean O2 as able pulmonology following    10. History of asthma      Plan       Continue with ipratropium-albuterol 1 ampule inhalation q4h WA        Hx of SERENA       Patient not using CPAP at home     11. Mild-moderate oropharyngeal dysphasia        SLP re-evaluation        Modified barium swallow showed impaired swallowing        No barium aspiration       Currently on pureed diet         Plan        Instruct patient to continue improving her swallowing with tongue protrusion      12. Metabolic alkalosis likely 2/2 diuresis - improving        Normal bicarb on admission           Plan         Per nephro -bumex started MWF     14. Acute on chronic anemia -stable       Most recent Hb 8.8 trending down       EGD 7/19/2022 showed moderate diffuse gastritis and duodenitis with no bleeding        Previous EGD in 2015 showed mild gastritis        FOBT (+) on 7/16/2022        Melena 7/15/2022        H. Pylori negative          Plan       Continue oral PPI, continue carafate        Monitor for drop of Hb    15. Hx of type 2 DM w/ neuropathy on Lantus 10 and Novolog SS at home          Tried 7 units glargine but had a BG of 95 so was switched back to 5 units         Plan         Insulin glargine 5 units SQ nightly and insulin lispro 0-6 units SQ 4X daily ACHS    Problems resolved during this admission:    SHANTANU   Acute encephalopathy 2/2 to septic shock, dig toxicity, uremia   Acute hypoxic hypercapnic resp failure 2/2 right pleural effusion possible HPA vs acute decompensated HFpEF exacerbation EF 70-75%   Shock likely septic, HAP, digoxin toxicity   HAGMA 2/2 lactic acidosis, uremia SHANTANU   Elevated troponin   SHANTANU stage III on CKD   Hypokalemia     PT/OT: Continue   DVT ppx: Eliquis   GI ppx: Protonix BID 40mg, carafate, purred diet with adult oral nutrition       Next of Kin/ POA: Daugther   Code Status:   Full code     Disposition: Precert restarted, may need oxygen on discharge       Duane Flowers, MD, PGY-1  Attending physician: Dr. Shiraz Hanson      Senior Resident Statement  I have seen and examined the patient with the intern. I have discussed the case, including pertinent history and exam findings with the intern. I agree with the assessment, plan and orders as documented by the intern.      Jeremiah Borja MD PGY- 2  Electronically signed by Jeremiah Borja MD on 7/26/2022 at 2:51 PM    Attending physician: Dr. Shiraz Hanson

## 2022-07-26 NOTE — PROGRESS NOTES
[facility]  Internal Medicine Residency / [address]    Attending Physician Statement  I have discussed the case, including pertinent history and exam findings with the resident and the team.  I have seen and examined the patient and the key elements of the encounter have been performed by me. I have also personally reviewed imaging studies and labs. I agree with the assessment, plan and orders as documented by the resident. Doing well this morning. Still on minimal oxygen per NC. BP and HR stable overnight off BB. Cardiac rate normal, in sinus. Clear breath sounds. Dressing over previous CT site is clean. Minimal sacral edema. UO remains clear and adequate (1600cc in 24 hours). Assessment:  SHANTANU, resolved  Metabolic alkalosis, improved with diamox  Acute hypoxic respiratory failure 2/2 to CAP, transudative pleural effusion likely from HFpEF - stable on minimal oxygen per NC (2L). S/p chest tube insertion, R - removed 7/22/22. Currently on bumex MWF only. Chronic anemia, stable. Gastritis and duodenitis seen on EGD (7/19/22), no active bleeding and Hgb has been stable. H pylori negative. Mild to moderate oropharyngeal dysphagia still seen on MBS (7/20/22)  HTN, BB held yesterday because of low normal BP.   RLS, controlled with current management  AF, currently in sinus. DM, insulin-requiring, controlled  Septic shock, resolved. Abx course completed. Acute encephalopathy, resolved  Dysphagia          Plan:  Trial of low dose BB today  Continue current insulin regimen  Further diuresis and continuation of acetazolamide as recommended by Nephrology service  Continue ASA and eliquis. Monitor for drop of  hgb, recurrence of bleeding  Continue PPI, carafate  Continue holding digoxin, depakote  Soft diet today per SLP recommendation   Awaiting precert approval - stable for discharge  Appreciate care from all consult services.        Remainder of medical problems as per resident note.      Sarah Beth Sumner.  Jared Galvez MD  Internal Medicine

## 2022-07-26 NOTE — CARE COORDINATION
Social Work/Case Management Transition of Care Planning [facility] [place]lejor[facility] [phone]): Pre-cert is still pending for admission to [facility]. Discharge envelope is in the soft chart with PAS/RR, ambulance form and demographics.   ESTHER Addison  7/26/2022

## 2022-07-26 NOTE — PROGRESS NOTES
Pr seen for dysphagia. Recommend diet advance to soft foods with regular liquids and no straw.,  Will continue to monitor.

## 2022-07-26 NOTE — PROGRESS NOTES
[facility] MD Britney Simmons MD Wynona Drivers, MD .  Progress Note      Patient's Name: Praneeth Darden  1:31 PM  7/26/2022    Subjective  7/7 : seen in her room intubated mechanically ventilated fio2 40 . LE edema 1-2+ . On levophed . UO ok over last 24 hours     7/8 pt not in her room when coming to see her . She is in IR lab. Case d/w RN     7/9 seen in her room d/w ICU resident   Still on some pressors actually BP low when seeing her   Has CT in place with good drainage . Good UO in response to lasix via genao cath . Fio2 40 PEEP 8       7/10 seen in ICU intubated mechanically ventilated fio2 40 PEEP 8   1-2+ edema in UE and LEs   No pressors  On precedex drip     7/11 : seen in ICU extubated earlier today . BP stable on HFNC    7/12 seen in her room ,extubated , on o2/nc . Chest tube in place . Fevers today and precedex drip put on hold .good UO over last 24 hours     7/13 : seen in her room on o2/nc BP stable clinically doing better still with CT along with genao and fecal system    7/14 sitting in chair comfortable on o2/nc BP stable LE edema dependent . 7/15 ; weak on o2/nc . 7/16: In better spirits today. Denies dyspnea on nasal cannula. Receiving breathing treatment. Ongoing fatigue, generalized weakness and debilitation. Good appetite. Continued marked swelling. Asks about eating potato chips, discussed why this would be a bad idea. 7/18: Orthopnea. Unable to lay flat for EGD. Occasional wheezing. No dyspnea at rest at 30 degrees on nasal cannula. Swelling in her thighs sacrum and abdominal pannus seem worse to her. Very little distal swelling. Hungry and thirsty. Ongoing fatigue and generalized weakness. No chest pain or palpitations    7/19: Sleepy, somnolent though easily  awakened. Thinks her swelling may be a little bit better. 7/20: Feeling better. No dyspnea at rest.  Abdominal pannus swelling seems to be a little better. No peripheral swelling.    7/21: Ongoing fatigue and generalized weakness. Denies dyspnea at rest on room air. No peripheral swelling, though still has substantial edema in her abdominal pannus, upper thighs sacrum and lower back. 7/22: Feeling little bit better. Denies new complaint. ROS unremarkable    7/23 : seen in her room , stable bp on RA , appear euvolemic     7/24 working with physical therapy weak no LE edema . 7/25 : seen in her room , on RA . Comfortable . In NAD     7/26 : seen in her room comfortable euvolemic sitting in chair     History of Present Ilness:      69-year old female with a past medical history of hypertension, A. fib, CAD, asthma, HFpEF, CKD, SERENA, hyperlipidemia, type II DM, anxiety/depression, Parkinson's disease, restless leg syndrome who presented in the ED for altered mental status. She recently had a right second toe amputation back last month at [facility].  She was discharged after 2 weeks. According to the patient's family, she was doing okay until few hours prior to admission, patient was noted to be acting different yesterday morning. She was noted to be drowsy    Nephrology asked to see for SHANTANU   I did see pt in ICU she is intubated mechanically ventilated . She is on levophed . She has some LE edema. Vent setting stable .    UO marginal .       Allergies:  Ciprofloxacin and Codeine    Current Medications:    bumetanide (BUMEX) tablet 1 mg, Q MWF  metoprolol tartrate (LOPRESSOR) tablet 12.5 mg, BID  insulin glargine-yfgn (SEMGLEE-YFGN) injection vial 5 Units, Nightly  pantoprazole (PROTONIX) tablet 40 mg, BID AC  apixaban (ELIQUIS) tablet 5 mg, BID  aspirin chewable tablet 81 mg, Daily  sucralfate (CARAFATE) tablet 1 g, 4 times per day  menthol-zinc oxide (CALMOSEPTINE) 0.44-20.6 % ointment, BID  0.9 % sodium chloride infusion, PRN  rOPINIRole (REQUIP) tablet 1 mg, TID  benzocaine-menthol (CEPACOL SORE THROAT) lozenge 1 lozenge, Q2H PRN  QUEtiapine (SEROQUEL) tablet 25 mg, BID  melatonin disintegrating tablet 5 mg, Nightly  labetalol (NORMODYNE;TRANDATE) injection 10 mg, Q6H PRN  hydrALAZINE (APRESOLINE) injection 10 mg, Q6H PRN  amiodarone (CORDARONE) tablet 200 mg, Daily  insulin lispro (HUMALOG) injection vial 0-6 Units, 4x Daily AC & HS  carbidopa-levodopa (SINEMET CR)  MG per extended release tablet 2 tablet, TID  ondansetron (ZOFRAN) injection 4 mg, Q6H PRN  budesonide (PULMICORT) nebulizer suspension 500 mcg, BID  levalbuterol (XOPENEX) nebulization 0.63 mg, Q4H PRN  ipratropium-albuterol (DUONEB) nebulizer solution 1 ampule, Q4H WA  polyethylene glycol (GLYCOLAX) packet 17 g, BID  atorvastatin (LIPITOR) tablet 20 mg, Daily  sodium chloride flush 0.9 % injection 5-40 mL, 2 times per day  0.9 % sodium chloride infusion, PRN  acetaminophen (TYLENOL) tablet 650 mg, Q6H PRN   Or  acetaminophen (TYLENOL) suppository 650 mg, Q6H PRN  glucose chewable tablet 16 g, PRN  dextrose bolus 10% 125 mL, PRN   Or  dextrose bolus 10% 250 mL, PRN  glucagon (rDNA) injection 1 mg, PRN  dextrose 5 % solution, PRN      Review of Systems:   Unable to obtain due to clinical conditon . Physical exam:   Vital signs /81   Pulse 88   Temp 97.7 °F (36.5 °C) (Temporal)   Resp 18   Ht 5' (1.524 m)   Wt 206 lb 3.2 oz (93.5 kg)   SpO2 100%   BMI 40.27 kg/m²   Gen : moderate distress  Head : at , nc   Neck : supple , no thyromegaly noted . Eyes : EOMI , PERRLA   CV : tachycardiac 1+ edema in LE   Lungs: good flow heard b/l   Abd : soft , NT , BS + , No Organomegaly appreciated . Skin : soft, dry . Extremities: 2+ pitting edema lower back sacrum and thighs and inferior abdominal pannus, though no swelling in her distal lower and upper extremities  Neuro : CN  II-XII grossly intact , no focal neurologic deficit . Psych : cooperative .      Data:   Labs:  CBC with Differential:    Lab Results   Component Value Date/Time    WBC 3.1 07/25/2022 04:46 AM    RBC 3.13 07/25/2022 04:46 AM    HGB 8.8 07/25/2022 04:46 AM    HCT 29.3 07/25/2022 04:46 AM     07/25/2022 04:46 AM    MCV 93.6 07/25/2022 04:46 AM    MCH 28.1 07/25/2022 04:46 AM    MCHC 30.0 07/25/2022 04:46 AM    RDW 17.9 07/25/2022 04:46 AM    NRBC 0.0 03/15/2019 09:10 AM    SEGSPCT 74 08/20/2013 10:45 AM    METASPCT 0.9 07/14/2022 05:31 AM    LYMPHOPCT 26.8 07/25/2022 04:46 AM    MONOPCT 7.7 07/25/2022 04:46 AM    MYELOPCT 2.6 07/14/2022 05:31 AM    EOSPCT 1 06/16/2017 12:00 AM    BASOPCT 0.6 07/25/2022 04:46 AM    MONOSABS 0.24 07/25/2022 04:46 AM    LYMPHSABS 0.84 07/25/2022 04:46 AM    EOSABS 0.08 07/25/2022 04:46 AM    BASOSABS 0.02 07/25/2022 04:46 AM     CMP:    Lab Results   Component Value Date/Time     07/26/2022 05:12 AM    K 3.9 07/26/2022 05:12 AM    K 5.0 07/06/2022 02:41 AM    CL 92 07/26/2022 05:12 AM    CO2 38 07/26/2022 05:12 AM    BUN 18 07/26/2022 05:12 AM    CREATININE 1.0 07/26/2022 05:12 AM    GFRAA >60 07/26/2022 05:12 AM    LABGLOM 54 07/26/2022 05:12 AM    GLUCOSE 127 07/26/2022 05:12 AM    PROT 6.4 07/26/2022 05:12 AM    LABALBU 4.3 07/26/2022 05:12 AM    CALCIUM 9.4 07/26/2022 05:12 AM    BILITOT 0.9 07/26/2022 05:12 AM    ALKPHOS 89 07/26/2022 05:12 AM    AST 8 07/26/2022 05:12 AM    ALT <5 07/26/2022 05:12 AM     Ionized Calcium:  No results found for: IONCA  Magnesium:    Lab Results   Component Value Date/Time    MG 1.9 07/26/2022 05:12 AM     Phosphorus:    Lab Results   Component Value Date/Time    PHOS 4.6 07/26/2022 05:12 AM     U/A:    Lab Results   Component Value Date/Time    COLORU Yellow 07/05/2022 05:43 PM    PHUR 5.5 07/05/2022 05:43 PM    WBCUA 1-3 07/05/2022 05:43 PM    RBCUA NONE 07/05/2022 05:43 PM    RBCUA NONE 03/25/2013 09:00 PM    YEAST RARE 10/27/2015 07:18 PM    BACTERIA FEW 07/05/2022 05:43 PM    CLARITYU Clear 07/05/2022 05:43 PM    SPECGRAV 1.025 07/05/2022 05:43 PM    LEUKOCYTESUR Negative 07/05/2022 05:43 PM    UROBILINOGEN 0.2 07/05/2022 05:43 Result   Catheter is in the stomach. XR CHEST PORTABLE   Final Result   Catheter placed with significant right lung aeration improvement and no   pneumothorax         MRI BRAIN WO CONTRAST   Final Result   1. No acute intracranial abnormality. 2. No gross epileptogenic lesion is identified. 3. Bilateral mastoid effusions, which may be due to eustachian tube   dysfunction or otomastoiditis. RECOMMENDATIONS:   Unavailable         CT GUIDED CHEST TUBE   Final Result   Successful uncomplicated  right chest tube placement      Recommendations:   1. Follow-up tube check in 2 weeks   2. Flush tube daily with 5 to 10 mL of sterile saline            XR CHEST PORTABLE   Final Result   1. There is no interval change in the bilateral perihilar and lower lobe   airspace disease more prominent within the right lung. 2. Moderate size right pleural effusion. 3. There is no pneumothorax. US RETROPERITONEAL COMPLETE   Final Result   1. Marked bilateral renal cortical thinning. Echogenic renal parenchyma. No hydronephrosis. 2.  A Diaz catheter appears to be decompressing the bladder. XR CHEST PORTABLE   Final Result   Increasing infiltrate throughout the right lung persistent left basilar   alveolar infiltrate is well. XR CHEST PORTABLE   Final Result   Adequately positioned right internal jugular central venous line. Otherwise,   no significant change compared to prior exam glued in lines and tubes. US DUP LOWER EXTREMITIES BILATERAL VENOUS   Final Result   Within the visualized vessels there is no evidence for deep venous   thrombosis               XR CHEST PORTABLE   Final Result   1. No significant change. Cardiomegaly with right pleural effusion. 2.  Support tubes remain in appropriate position. CT HEAD WO CONTRAST   Final Result   No acute intracranial abnormality or significant change in the overall   appearance of the brain.  MRI would be useful if symptoms persist.      RECOMMENDATIONS:   Unavailable         XR ABDOMEN FOR NG/OG/NE TUBE PLACEMENT   Final Result   Catheter is in the proximal stomach. XR CHEST PORTABLE   Final Result   Adequately positioned endotracheal tube. Nasogastric tube coursing below   diaphragm. Otherwise, stable exam.         CT ABDOMEN PELVIS WO CONTRAST Additional Contrast? None   Final Result   Increasing fluid within the abdomen when compared to the prior study. The   anasarca is also increased in appearance of the prior examination. Mild stranding seen around the mesentery which correlation to clinical lab   values is recommended to exclude possible pancreatitis or volume overload. Overall the appearance of the pancreas itself is grossly unremarkable. There   is only mild stranding seen throughout the mesenteric root. CT CHEST WO CONTRAST   Final Result   Bilateral lung infiltrates with greatest consolidation right lower lobe   probably secondary to atelectasis and or pneumonia. Moderate right pleural effusion. Cardiomegaly and small pericardial effusion. The pericardial effusion is new. Small volume right upper abdominal ascites along with anasarca. This is new. XR CHEST PORTABLE   Final Result   New opacity on the right which may relate to pleural effusion with adjacent   atelectasis and or infiltrate. Cardiomegaly and pulmonary vascular   congestion implying elevated left heart filling pressures. Assessment    1. Acute kidney injury non oligouric: improved   Baseline cr 0.9  Etiology of SHANTANU due to decrease effective circulating volume in setting of intravascular volume depletion as evident by her need for levophed and her urine lytes with high avidity for cl and Na . Basically bland urine sediment which make GN/vasculitis unlikely   High BUN in part due to steroids   LE edema noted though she is still on levophed    2.   Encephalopathy metabolic multifactorial : Improved    3. Digoxin toxicity s/p digbind. Resolved    4. Septic shock 2/2 PNA : resolved    5. Right Pleural Effusion with catheter in place for drainage    6.   Edema, marked, multifactorial        Recommendations    Cr stable   Euvolemic     Continue to support hemoydnamics  Bumex 1 mg po MWF   Pt/ot  Am labs      Electronically signed by Reji Meza MD on 7/26/2022

## 2022-07-26 NOTE — PLAN OF CARE
Problem: Chronic Conditions and Co-morbidities  Goal: Patient's chronic conditions and co-morbidity symptoms are monitored and maintained or improved  Outcome: Progressing Towards Goal     Problem: Discharge Planning  Goal: Discharge to home or other facility with appropriate resources  Outcome: Progressing Towards Goal     Problem: Pain  Goal: Verbalizes/displays adequate comfort level or baseline comfort level  Outcome: Progressing Towards Goal     Problem: Skin/Tissue Integrity  Goal: Absence of new skin breakdown  Description: 1. Monitor for areas of redness and/or skin breakdown  2. Assess vascular access sites hourly  3. Every 4-6 hours minimum:  Change oxygen saturation probe site  4. Every 4-6 hours:  If on nasal continuous positive airway pressure, respiratory therapy assess nares and determine need for appliance change or resting period.   Outcome: Progressing Towards Goal     Problem: ABCDS Injury Assessment  Goal: Absence of physical injury  Outcome: Progressing Towards Goal     Problem: Safety - Adult  Goal: Free from fall injury  Outcome: Progressing Towards Goal     Problem: Respiratory - Adult  Goal: Achieves optimal ventilation and oxygenation  Outcome: Progressing Towards Goal     Problem: Cardiovascular - Adult  Goal: Maintains optimal cardiac output and hemodynamic stability  Outcome: Progressing Towards Goal     Problem: Metabolic/Fluid and Electrolytes - Adult  Goal: Hemodynamic stability and optimal renal function maintained  Outcome: Progressing Towards Goal     Problem: Neurosensory - Adult  Goal: Absence of seizures  Outcome: Progressing Towards Goal     Problem: Skin/Tissue Integrity - Adult  Goal: Skin integrity remains intact  Outcome: Progressing Towards Goal     Problem: Musculoskeletal - Adult  Goal: Return mobility to safest level of function  Outcome: Progressing Towards Goal     Problem: Genitourinary - Adult  Goal: Absence of urinary retention  Outcome: Progressing Towards Goal     Problem: Infection - Adult  Goal: Absence of infection during hospitalization  Outcome: Progressing Towards Goal     Problem: Nutrition Deficit:  Goal: Optimize nutritional status  Outcome: Progressing Towards Goal

## 2022-07-26 NOTE — PROGRESS NOTES
[place]  Department of Internal Medicine  Division of Pulmonary, Critical Care and Sleep Medicine  Progress Note    Jamie Beckett DO, MPH, [facility], [address] [facility] [place] CRABTREE MD      Patient: Lenny Dorsey  MRN: [id number]  : 1950    Encounter Time: 5:45 PM     Date of Admission: 2022 12:25 PM    Primary Care Physician: No primary care provider on file. Reason for Consultation: Drain CT management     SUBJECTIVE:  Ok    OBJECTIVE:     PHYSICAL EXAM:   VITALS:   Vitals:    22 0454 22 0833 22 0845 22 1247   BP:   109/81    Pulse:   88    Resp:   18    Temp:   97.7 °F (36.5 °C)    TempSrc:   Temporal    SpO2:  99%  100%   Weight: 206 lb 3.2 oz (93.5 kg)      Height:            Intake/Output Summary (Last 24 hours) at 2022 1745  Last data filed at 2022 1145  Gross per 24 hour   Intake 480 ml   Output 1600 ml   Net -1120 ml          CONSTITUTIONAL:    Alert  SKIN:     No rash,   HEENT:     EOMI, MMM, No thrush  NECK:    No bruits, No JVP appreciated  CV:      Sinus,  No murmur, No rubs, No gallops  PULMONARY:   Couse BS,  No Wheezing, No Rales, No Rhonchi      CT noted  ABDOMEN:     Soft, non-tender. BS normal. No R/R/G  EXT:    No deformities . No clubbing. Edema ower extremity edema, No venous stasis  PULSE:   Appears equal and palpable.   PSYCHIATRIC:  Seems appropriate, No acute psychosis  MS:    Gross weakness  NEUROLOGIC:   The clinical assessment is non-focal     DATA: IMAGING & TESTING:     LABORATORY TESTS:    CBC:   Lab Results   Component Value Date/Time    WBC 3.1 2022 04:46 AM    RBC 3.13 2022 04:46 AM    HGB 8.8 2022 04:46 AM    HCT 29.3 2022 04:46 AM    MCV 93.6 2022 04:46 AM    MCH 28.1 2022 04:46 AM    MCHC 30.0 2022 04:46 AM    RDW 17.9 2022 04:46 AM     2022 04:46 AM    MPV 11.3 2022 04:46 AM     CMP: Lab Results   Component Value Date/Time     07/26/2022 05:12 AM    K 3.9 07/26/2022 05:12 AM    K 5.0 07/06/2022 02:41 AM    CL 92 07/26/2022 05:12 AM    CO2 38 07/26/2022 05:12 AM    BUN 18 07/26/2022 05:12 AM    CREATININE 1.0 07/26/2022 05:12 AM    GFRAA >60 07/26/2022 05:12 AM    LABGLOM 54 07/26/2022 05:12 AM    GLUCOSE 127 07/26/2022 05:12 AM    PROT 6.4 07/26/2022 05:12 AM    LABALBU 4.3 07/26/2022 05:12 AM    CALCIUM 9.4 07/26/2022 05:12 AM    BILITOT 0.9 07/26/2022 05:12 AM    ALKPHOS 89 07/26/2022 05:12 AM    AST 8 07/26/2022 05:12 AM    ALT <5 07/26/2022 05:12 AM     Calcium:    Lab Results   Component Value Date/Time    CALCIUM 9.4 07/26/2022 05:12 AM     Ionized Calcium:  No results found for: IONCA  Magnesium:    Lab Results   Component Value Date/Time    MG 1.9 07/26/2022 05:12 AM     Phosphorus:    Lab Results   Component Value Date/Time    PHOS 4.6 07/26/2022 05:12 AM     PT/INR:    Lab Results   Component Value Date/Time    PROTIME 20.9 07/21/2022 04:34 AM    INR 1.9 07/21/2022 04:34 AM        PRO-BNP:   Lab Results   Component Value Date    PROBNP 37,248 (H) 07/12/2022    PROBNP 41,368 (H) 07/06/2022      ABGs:   Lab Results   Component Value Date/Time    PH 7.452 07/11/2022 05:07 AM    PO2 133.6 07/11/2022 05:07 AM    PCO2 40.0 07/11/2022 05:07 AM     Hemoglobin A1C: No components found for: HGBA1C    IMAGING:  Imaging tests were completed and reviewed and discussed radiology and care team involved and reveals   Echo Complete    Result Date: 7/7/2022  Findings   Left Ventricle  Micro-bubble contrast injected to enhance left ventricular visualization. Normal left ventricular size and systolic function. Ejection fraction is visually estimated at 70-75%. Indeterminate diastolic function. No regional wall motion abnormalities seen. Mild left ventricular concentric hypertrophy noted. Right Ventricle  Moderately dilated right ventricle.   Right ventricle global systolic function is reduced. TAPSE 1.1 cm. Left Atrium  The left atrium is moderate-severely dilated. JUANY 48 ml/m2. The interatrial septum appears intact. Right Atrium  Mildly enlarged right atrium. Mitral Valve  Structurally normal mitral valve. No evidence of mitral valve stenosis. Physiologic mitral regurgitation. Tricuspid Valve  The tricuspid valve appears structurally normal.  Mild tricuspid regurgitation. RVSP is at least 60 mmHg. Aortic Valve  Individual aortic valve leaflets are not clearly visualized. No hemodynamically significant aortic stenosis is present. No evidence of aortic valve regurgitation. Pulmonic Valve  The pulmonic valve was not well visualized. No evidence of pulmonic valve stenosis. No evidence of any pulmonic regurgitation. Pericardial Effusion  Prominent pericardial fat pad. No definitive evidence of pericardial effusion. Aorta  Aortic root dimension within normal limits. Miscellaneous  Dilated Inferior Vena Cava. Conclusions   Summary  Technically difficult study - limited visualization. Micro-bubble contrast injected to enhance left ventricular visualization. Normal left ventricular size and systolic function. Ejection fraction is visually estimated at 70-75%. Indeterminate diastolic function. No regional wall motion abnormalities seen. Mild left ventricular concentric hypertrophy noted. Moderately dilated right ventricle with reduced function. Biatrial dilation. Mild tricuspid regurgitation. RVSP is at least 60 mmHg. Prominent pericardial fat pad. No definitive evidence of pericardial effusion. Result Date: 7/5/2022  FINDINGS: Lower Chest: See the CT report of the chest dated the same date for full details. Organs: The liver is grossly unremarkable. The spleen is unremarkable. Gallbladder is been surgically removed. The pancreas reveals some mild fatty replacement. Both adrenal glands are within normal limits.   The kidneys are unremarkable in appearance. GI/Bowel: Stomach is within normal limits. No mucosal abnormality. Stool seen scattered diffusely throughout the colon. There is no wall thickening. No mucosal abnormality or distension of the small bowel. Pelvis: Pelvic organs reveal mild wall thickening of the bladder with incomplete distension. The uterus is grossly unremarkable. Peritoneum/Retroperitoneum: There is no abdominal retroperitoneal lymphadenopathy. There is mild stranding identified in the root of the mesentery as well as surrounding the pancreas in which mild inflammation of the pancreas cannot be completely excluded. Correlation to clinical lab values is recommended. No pelvic adenopathy with moderate atherosclerotic disease seen within the abdominal aorta and iliac vessels. Free fluid identified throughout the upper abdomen. Extensive vascular calcifications seen within the mesenteric vessels as well as the abdominal aorta. Bones/Soft Tissues: The bony structures reveal extensive degenerative changes seen within the spine and pelvis. Severe degenerative changes seen within the sacroiliac joints bilaterally right greater than left some sclerosis on the right to suggest prior healed sacroiliitis. There is extensive anasarca seen within the subcutaneous tissues. Increasing fluid within the abdomen when compared to the prior study. The anasarca is also increased in appearance of the prior examination. Mild stranding seen around the mesentery which correlation to clinical lab values is recommended to exclude possible pancreatitis or volume overload. Overall the appearance of the pancreas itself is grossly unremarkable. There is only mild stranding seen throughout the mesenteric root. CT HEAD WO CONTRAST  Result Date: 7/5/2022  No acute intracranial abnormality or significant change in the overall appearance of the brain.  MRI would be useful if symptoms persist. RECOMMENDATIONS: Unavailable     CT CHEST WO second  of  fluoroscopy. The patient received local anesthesia. The patient was monitored for 60 minutes by a registered nurse. Successful uncomplicated  right chest tube placement Recommendations: 1. Follow-up tube check in 2 weeks 2. Flush tube daily with 5 to 10 mL of sterile saline     MRI BRAIN WO CONTRAST  Result Date: 7/8/2022  FINDINGS: INTRACRANIAL STRUCTURES/VENTRICLES: There is no acute infarct. No mass effect, edema or hemorrhage is seen. Mild volume loss is seen in the cerebrum with mild chronic microvascular ischemic changes. No hydrocephalus or extra-axial fluid is seen. ORBITS: Prosthetic lenses are seen in the globes bilaterally. The orbits are otherwise grossly unremarkable. SINUSES: Mild mucosal thickening in the paranasal sinuses. There is pooling of secretions in the nasopharynx. Moderate bilateral mastoid effusions. BONES/SOFT TISSUES: The bone marrow signal intensity appears normal. The soft tissues demonstrate no acute abnormality. 1.  No acute intracranial abnormality. 2. No gross epileptogenic lesion is identified. 3. Bilateral mastoid effusions, which may be due to eustachian tube dysfunction or otomastoiditis. RECOMMENDATIONS: Unavailable     US DUP LOWER EXTREMITIES BILATERAL VENOUS  Within the visualized vessels there is no evidence for deep venous thrombosis       Assessment: 1. Acute hypoxic hypercapnic respiratory failure 2/2 mod R pleural effusion, possible HAP vs acute decompensated  HFpEF exacerbation (EF 70-75%)  2. Pulmonary hypertension likely Type II 2/2 acute decompensated diastolic heart failure, in the setting of Hx SERENA  3. Moderate R pleural effusion, likely 2/2 HAP (Klebsiella pneumoniae) vs acute decompensated heart failure. D4 CT, 250 cc output in 24 hrs  4. HAP (Klebsiella pneumoniae on resp culture, light growth). D1 cefepime  5. Elevated pro-BNP, multifactorial, likely acute decompensated HFpEF, septic shock.    6. Elevated troponin likely 2/2 demand ischemia  7. Permanent atrial fibrillation, s/p DCCV (April and May 2022), now rate controlled. 8. SHANTANU Stage III on CKD multifactorial, pre-renal (hemodynamically mediated, DHN 2/2 diuretic use, poor oral intake); vs ATN 2/2 septic shock. 9. Hypernatremia likely 2/2 over diuresis  10. Metabolic alkalosis likely contraction alkalosis 2/2 diuresis, poor oral intake  11. Pancytopenia likely 2/2 BM suppression 2/2 chronic illness, meds (Zosyn)  12. Acute on chronic macrocytic anemia, multifactorial, likely 2/2 chronic disease, blood draws,  Less likely hemolysis (no schistocytes, haptoglobin wnl) s/p 1 unit PRBC 7/11, FOBT positive today, with pinkish tinge on chest tube output  13. Thrombocytopenia,likely 2/2 #11, less likely HIT (not exposed to heparin products), DIC, hemolysis ruled out (work-up negative)  14. Prolonged INR, ? HFP wnl, no overt bleeding, apixaban and aspirin on hold. Given Vit K yesterday for INR 3.2>2 today  15. Low grade fever, multifactorial, ? meds (Precedex?), CLABSI?, no new leukocytosis, or hemodynamic instability   16. Hx of asthma  17. Hx of SERENA. Not using CPAP at home  18. Hx of HTN. -160`s  19. Hx of CAD  20. Hx of Type 2 DM with neuropathy. On Lantus 10 and Novolog SS at home  21. Hx of depression/anxiety. 22. Hx of restless leg syndrome  23. Hx of Parkinson's disease.  On Sinemet and ropinirole at home, which was discontinued upon discharge at [address]:   Wean oxygen  Placement pending      Elisa DO LEYDA Birch, MPH, [facility]  Professor of Internal Medicine  Pulmonary, Critical Care and Sleep Medicine

## 2022-07-27 VITALS
SYSTOLIC BLOOD PRESSURE: 137 MMHG | HEART RATE: 84 BPM | RESPIRATION RATE: 18 BRPM | HEIGHT: 60 IN | OXYGEN SATURATION: 99 % | WEIGHT: 202.6 LBS | DIASTOLIC BLOOD PRESSURE: 74 MMHG | TEMPERATURE: 97.6 F | BODY MASS INDEX: 39.78 KG/M2

## 2022-07-27 LAB
ALBUMIN SERPL-MCNC: 4.1 G/DL (ref 3.5–5.2)
ALP BLD-CCNC: 89 U/L (ref 35–104)
ALT SERPL-CCNC: <5 U/L (ref 0–32)
ANION GAP SERPL CALCULATED.3IONS-SCNC: 14 MMOL/L (ref 7–16)
AST SERPL-CCNC: 10 U/L (ref 0–31)
BASOPHILS ABSOLUTE: 0.04 E9/L (ref 0–0.2)
BASOPHILS RELATIVE PERCENT: 1.1 % (ref 0–2)
BILIRUB SERPL-MCNC: 1 MG/DL (ref 0–1.2)
BUN BLDV-MCNC: 14 MG/DL (ref 6–23)
CALCIUM SERPL-MCNC: 9.1 MG/DL (ref 8.6–10.2)
CHLORIDE BLD-SCNC: 95 MMOL/L (ref 98–107)
CO2: 32 MMOL/L (ref 22–29)
CREAT SERPL-MCNC: 1 MG/DL (ref 0.5–1)
EOSINOPHILS ABSOLUTE: 0.09 E9/L (ref 0.05–0.5)
EOSINOPHILS RELATIVE PERCENT: 2.4 % (ref 0–6)
GFR AFRICAN AMERICAN: >60
GFR NON-AFRICAN AMERICAN: 54 ML/MIN/1.73
GLUCOSE BLD-MCNC: 80 MG/DL (ref 74–99)
HCT VFR BLD CALC: 30.7 % (ref 34–48)
HEMOGLOBIN: 9 G/DL (ref 11.5–15.5)
IMMATURE GRANULOCYTES #: 0.02 E9/L
IMMATURE GRANULOCYTES %: 0.5 % (ref 0–5)
LYMPHOCYTES ABSOLUTE: 0.84 E9/L (ref 1.5–4)
LYMPHOCYTES RELATIVE PERCENT: 22.8 % (ref 20–42)
MAGNESIUM: 2.1 MG/DL (ref 1.6–2.6)
MCH RBC QN AUTO: 27.3 PG (ref 26–35)
MCHC RBC AUTO-ENTMCNC: 29.3 % (ref 32–34.5)
MCV RBC AUTO: 93 FL (ref 80–99.9)
METER GLUCOSE: 169 MG/DL (ref 74–99)
METER GLUCOSE: 83 MG/DL (ref 74–99)
MONOCYTES ABSOLUTE: 0.33 E9/L (ref 0.1–0.95)
MONOCYTES RELATIVE PERCENT: 9 % (ref 2–12)
NEUTROPHILS ABSOLUTE: 2.36 E9/L (ref 1.8–7.3)
NEUTROPHILS RELATIVE PERCENT: 64.2 % (ref 43–80)
PDW BLD-RTO: 18.4 FL (ref 11.5–15)
PHOSPHORUS: 3.9 MG/DL (ref 2.5–4.5)
PLATELET # BLD: 155 E9/L (ref 130–450)
PMV BLD AUTO: 11.4 FL (ref 7–12)
POTASSIUM SERPL-SCNC: 3.8 MMOL/L (ref 3.5–5)
RBC # BLD: 3.3 E12/L (ref 3.5–5.5)
SODIUM BLD-SCNC: 141 MMOL/L (ref 132–146)
TOTAL PROTEIN: 6.4 G/DL (ref 6.4–8.3)
WBC # BLD: 3.7 E9/L (ref 4.5–11.5)

## 2022-07-27 PROCEDURE — 6370000000 HC RX 637 (ALT 250 FOR IP): Performed by: INTERNAL MEDICINE

## 2022-07-27 PROCEDURE — 80053 COMPREHEN METABOLIC PANEL: CPT

## 2022-07-27 PROCEDURE — 6360000002 HC RX W HCPCS: Performed by: SURGERY

## 2022-07-27 PROCEDURE — 2500000003 HC RX 250 WO HCPCS: Performed by: STUDENT IN AN ORGANIZED HEALTH CARE EDUCATION/TRAINING PROGRAM

## 2022-07-27 PROCEDURE — 6370000000 HC RX 637 (ALT 250 FOR IP): Performed by: SURGERY

## 2022-07-27 PROCEDURE — 82962 GLUCOSE BLOOD TEST: CPT

## 2022-07-27 PROCEDURE — 85025 COMPLETE CBC W/AUTO DIFF WBC: CPT

## 2022-07-27 PROCEDURE — 99231 SBSQ HOSP IP/OBS SF/LOW 25: CPT | Performed by: INTERNAL MEDICINE

## 2022-07-27 PROCEDURE — 92526 ORAL FUNCTION THERAPY: CPT

## 2022-07-27 PROCEDURE — 83735 ASSAY OF MAGNESIUM: CPT

## 2022-07-27 PROCEDURE — 2700000000 HC OXYGEN THERAPY PER DAY

## 2022-07-27 PROCEDURE — 94640 AIRWAY INHALATION TREATMENT: CPT

## 2022-07-27 PROCEDURE — 36415 COLL VENOUS BLD VENIPUNCTURE: CPT

## 2022-07-27 PROCEDURE — 84100 ASSAY OF PHOSPHORUS: CPT

## 2022-07-27 PROCEDURE — 2580000003 HC RX 258: Performed by: SURGERY

## 2022-07-27 RX ORDER — HYDROXYZINE PAMOATE 25 MG/1
25 CAPSULE ORAL ONCE
Status: COMPLETED | OUTPATIENT
Start: 2022-07-27 | End: 2022-07-27

## 2022-07-27 RX ORDER — BUMETANIDE 1 MG/1
TABLET ORAL
Qty: 30 TABLET | Refills: 3 | DISCHARGE
Start: 2022-07-27 | End: 2022-08-24 | Stop reason: DRUGHIGH

## 2022-07-27 RX ADMIN — AMIODARONE HYDROCHLORIDE 200 MG: 200 TABLET ORAL at 09:29

## 2022-07-27 RX ADMIN — IPRATROPIUM BROMIDE AND ALBUTEROL SULFATE 1 AMPULE: .5; 2.5 SOLUTION RESPIRATORY (INHALATION) at 08:33

## 2022-07-27 RX ADMIN — CARBIDOPA AND LEVODOPA 2 TABLET: 25; 100 TABLET, EXTENDED RELEASE ORAL at 13:04

## 2022-07-27 RX ADMIN — METOPROLOL TARTRATE 12.5 MG: 25 TABLET, FILM COATED ORAL at 09:28

## 2022-07-27 RX ADMIN — HYDROXYZINE PAMOATE 25 MG: 25 CAPSULE ORAL at 09:28

## 2022-07-27 RX ADMIN — ASPIRIN 81 MG CHEWABLE TABLET 81 MG: 81 TABLET CHEWABLE at 09:28

## 2022-07-27 RX ADMIN — PANTOPRAZOLE SODIUM 40 MG: 40 TABLET, DELAYED RELEASE ORAL at 05:45

## 2022-07-27 RX ADMIN — ANTI-FUNGAL POWDER MICONAZOLE NITRATE TALC FREE: 1.42 POWDER TOPICAL at 11:20

## 2022-07-27 RX ADMIN — IPRATROPIUM BROMIDE AND ALBUTEROL SULFATE 1 AMPULE: .5; 2.5 SOLUTION RESPIRATORY (INHALATION) at 15:31

## 2022-07-27 RX ADMIN — ROPINIROLE HYDROCHLORIDE 1 MG: 1 TABLET, FILM COATED ORAL at 13:05

## 2022-07-27 RX ADMIN — SUCRALFATE 1 G: 1 TABLET ORAL at 05:45

## 2022-07-27 RX ADMIN — BUMETANIDE 1 MG: 1 TABLET ORAL at 13:05

## 2022-07-27 RX ADMIN — QUETIAPINE FUMARATE 25 MG: 25 TABLET ORAL at 09:29

## 2022-07-27 RX ADMIN — APIXABAN 5 MG: 5 TABLET, FILM COATED ORAL at 09:30

## 2022-07-27 RX ADMIN — SUCRALFATE 1 G: 1 TABLET ORAL at 13:05

## 2022-07-27 RX ADMIN — CARBIDOPA AND LEVODOPA 2 TABLET: 25; 100 TABLET, EXTENDED RELEASE ORAL at 09:31

## 2022-07-27 RX ADMIN — SUCRALFATE 1 G: 1 TABLET ORAL at 00:08

## 2022-07-27 RX ADMIN — ATORVASTATIN CALCIUM 20 MG: 20 TABLET, FILM COATED ORAL at 09:28

## 2022-07-27 RX ADMIN — SODIUM CHLORIDE, PRESERVATIVE FREE 10 ML: 5 INJECTION INTRAVENOUS at 09:30

## 2022-07-27 RX ADMIN — BUDESONIDE 500 MCG: 0.5 SUSPENSION RESPIRATORY (INHALATION) at 08:33

## 2022-07-27 RX ADMIN — ROPINIROLE HYDROCHLORIDE 1 MG: 1 TABLET, FILM COATED ORAL at 09:32

## 2022-07-27 ASSESSMENT — ENCOUNTER SYMPTOMS
COUGH: 0
SHORTNESS OF BREATH: 0
DIARRHEA: 0
ABDOMINAL PAIN: 0
ABDOMINAL DISTENTION: 0
CONSTIPATION: 0
EYE DISCHARGE: 0
EYE REDNESS: 0
CHEST TIGHTNESS: 0
NAUSEA: 0
VOMITING: 0
CHOKING: 0

## 2022-07-27 NOTE — ADT AUTH CERT
Utilization Reviews         Sepsis and Other Febrile Illness, without Focal Infection - Care Day 21 (7/25/2022) by Mathieu Redmond RN       Review Status Review Entered   Completed 7/26/2022 16:44      Criteria Review      Care Day: 21 Care Date: 7/25/2022 Level of Care: Inpatient Floor    Guideline Day 2    Level Of Care    (X) ICU or floor    7/26/2022 4:11 PM EDT by Josie Mckeon      m/s floor    Clinical Status    (X) * Hemodynamic stability    7/26/2022 4:11 PM EDT by Darryl Huerta: 68, 81, 59,     BP: 123/63, 104/52, 117/52    (X) * Hypoxemia absent    7/26/2022 4:44 PM EDT by Josie Mckeon      still on O2    ( ) * Tachypnea absent    7/26/2022 4:11 PM EDT by Josie Mckeon      RR: 16, 20    Activity    (X) Activity as tolerated    7/26/2022 4:11 PM EDT by Josie Mckeon      ordered working with PT    Routes    (X) Parenteral or oral medications    7/26/2022 4:11 PM EDT by Josie Mckeon      amiodarone (CORDARONE) tablet 200 mg  Dose: 200 mg  Freq: DAILY Route: PO    apixaban (ELIQUIS) tablet 5 mg  Dose: 5 mg  Freq: 2 TIMES DAILY Route: PO    (X) Diet as tolerated    7/26/2022 4:11 PM EDT by Mathieu Zamora, [address] [place] [address]; Breakfast, Dinner; Fortified Pudding Oral Supplement    Interventions    (X) WBC    7/26/2022 4:11 PM EDT by Josie Mckeon      wbc: 3.1    * Milestone   Additional Notes   DATE: 07/25            Vitals:   97.8 , 20,  81,  104/52,  on 2L O2, 98% on NC         Abnl/Pertinent Labs/Radiology/Diagnostic Studies:      Glucose: 116, 111, 126, 193, 209   Wbc: 3.1   Rbc: 3.13   Hgb: 8.8   Hct: 29.3   Cl: 92   CO2: 38   Crea: 1.1            Physical Exam:      PULMONARY:     Couse BS,  No Wheezing, No Rales, No Rhonchi      CT noted            MD Consults/Assessments & Plans:      INT med:       Assessment:   1. SHANTANU, metabolic alkalosis may be from diuresis - continues to improve   2.  Acute hypoxic respiratory failure 2/2 to CAP, transudative pleural effusion likely from HFpEF - stable on minimal oxygen per NC. S/p chest tube insertion, R - removed 7/22/22. 3. Acute on chronic anemia. Gastritis and duodenitis seen on EGD (7/19/22), no active bleeding and Hgb has been stable. H pylori negative. 4. Mild to moderate oropharyngeal dysphagia still seen on MBS (7/20/22)   5. HTN, improved readings after hold BB. HR remains controlled. 6. RLS, controlled with current management   7. AF, currently in sinus. Now only on amiodarone. 8. DM, insulin-requiring, controlled   9. Septic shock, resolved. Abx course completed. 10. Acute encephalopathy, resolved           Plan:   Keep holding BB, resume as BP permits   Continue current insulin regimen   Further diuresis and continuation of acetazolamide as recommended by Nephrology service   Continue ASA and eliquis. Monitor for drop of  hgb, recurrence of bleeding   Continue PPI, carafate   Continue holding digoxin, depakote   No change in diet   Precert resubmitted today   Appreciate care from all consult services. Pulm:      Assessment: 1. Acute hypoxic hypercapnic respiratory failure 2/2 mod R pleural effusion, possible HAP vs acute decompensated  HFpEF exacerbation (EF 70-75%)   2. Pulmonary hypertension likely Type II 2/2 acute decompensated diastolic heart failure, in the setting of Hx SERENA   3. Moderate R pleural effusion, likely 2/2 HAP (Klebsiella pneumoniae) vs acute decompensated heart failure. D4 CT, 250 cc output in 24 hrs   4. HAP (Klebsiella pneumoniae on resp culture, light growth). D1 cefepime   5. Elevated pro-BNP, multifactorial, likely acute decompensated HFpEF, septic shock. 6. Elevated troponin likely 2/2 demand ischemia   7. Permanent atrial fibrillation, s/p DCCV (April and May 2022), now rate controlled. 8. SHANTANU Stage III on CKD multifactorial, pre-renal (hemodynamically mediated, DHN 2/2 diuretic use, poor oral intake); vs ATN 2/2 septic shock.     9. Hypernatremia likely 2/2 over diuresis   10. Metabolic alkalosis likely contraction alkalosis 2/2 diuresis, poor oral intake   11. Pancytopenia likely 2/2 BM suppression 2/2 chronic illness, meds (Zosyn)   12. Acute on chronic macrocytic anemia, multifactorial, likely 2/2 chronic disease, blood draws,  Less likely hemolysis (no schistocytes, haptoglobin wnl) s/p 1 unit PRBC 7/11, FOBT positive today, with pinkish tinge on chest tube output   13. Thrombocytopenia,likely 2/2 #11, less likely HIT (not exposed to heparin products), DIC, hemolysis ruled out (work-up negative)   14. Prolonged INR, ? HFP wnl, no overt bleeding, apixaban and aspirin on hold. Given Vit K yesterday for INR 3.2>2 today   15. Low grade fever, multifactorial, ? meds (Precedex?), CLABSI?, no new leukocytosis, or hemodynamic instability    16. Hx of asthma   17. Hx of SERENA. Not using CPAP at home   18. Hx of HTN. -160`s   19. Hx of CAD   20. Hx of Type 2 DM with neuropathy. On Lantus 10 and Novolog SS at home   21. Hx of depression/anxiety. 22. Hx of restless leg syndrome   23. Hx of Parkinson's disease. On Sinemet and ropinirole at home, which was discontinued upon discharge at [facility]:   Wean oxygen   Placement pending         Nephro:      Assessment       1. Acute kidney injury non oligouric: improved   Baseline cr 0.9   Etiology of SHANTANU due to decrease effective circulating volume in setting of intravascular volume depletion as evident by her need for levophed and her urine lytes with high avidity for cl and Na . Basically bland urine sediment which make GN/vasculitis unlikely   High BUN in part due to steroids   LE edema noted though she is still on levophed       2. Encephalopathy metabolic multifactorial : Improved       3. Digoxin toxicity s/p digbind. Resolved       4. Septic shock 2/2 PNA : resolved       5. Right Pleural Effusion with catheter in place for drainage       6.   Edema, marked, multifactorial               Recommendations       Cr stable   Euvolemic       Continue to support hemoydnamics   Bumex 1 mg po MWF    Pt/ot   Am labs            Medications:      aspirin chewable tablet 81 mg   Dose: 81 mg   Freq: DAILY Route: PO      atorvastatin (LIPITOR) tablet 20 mg   Dose: 20 mg   Freq: DAILY Route: PO      budesonide (PULMICORT) nebulizer suspension 500 mcg   Dose: 0.5 mg   Freq: 2 TIMES DAILY Route: NEBULIZATION      bumetanide (BUMEX) tablet 1 mg   Dose: 1 mg   Freq: EVERY MWF Route: PO      carbidopa-levodopa (SINEMET CR)  MG per extended release tablet 2 tablet   Dose: 2 tablet   Freq: 3 TIMES DAILY Route: PO      insulin glargine-yfgn (SEMGLEE-YFGN) injection vial 5 Units   Dose: 5 Units   Freq: NIGHTLY Route: SC x1      insulin lispro (HUMALOG) injection vial 0-6 Units   Dose: 0-6 Units   Freq: 4 TIMES DAILY BEFORE MEALS & NIGHTLY Route: SC         ipratropium-albuterol (DUONEB) nebulizer solution 1 ampule   Dose: 1 ampule   Freq: EVERY 4 HOURS WHILE AWAKE Route: IN      metoprolol tartrate (LOPRESSOR) tablet 25 mg   Dose: 25 mg   Freq: 2 TIMES DAILY Route: PO         pantoprazole (PROTONIX) tablet 40 mg   Dose: 40 mg   Freq: 2 TIMES DAILY BEFORE MEALS Route: PO      QUEtiapine (SEROQUEL) tablet 25 mg   Dose: 25 mg   Freq: 2 TIMES DAILY Route: PO      rOPINIRole (REQUIP) tablet 1 mg   Dose: 1 mg   Freq: 3 TIMES DAILY Route: PO      sucralfate (CARAFATE) tablet 1 g   Dose: 1 g   Freq: 4 times per day Route: PO            Orders:         Strict I&O, above meds         CM Assessments or Notes:      Miranda Jeffrey from [facility] group stated they will submitted for new pre-cert today.   Met with patient at bedside and she is in agreement with the plan                     Sepsis and Other Febrile Illness, without Focal Infection - Care Day 20 (7/24/2022) by Raj Cheung RN       Review Status Review Entered   Completed 7/26/2022 15:25      Criteria Review      Care Day: 20 Care Date: 7/24/2022 Level of Care: Inpatient Floor    Guideline Day 2    Level Of Care    (X) ICU or floor    7/26/2022 3:25 PM EDT by Rd Cano      m/s floor    Clinical Status    (X) * Hemodynamic stability    7/26/2022 3:25 PM EDT by Meme Fox: 68, 75, 73, 84, 76, 60    BP: 96/60, 104/64, 100/59, 96/54, 96/55    ( ) * Hypoxemia absent    ( ) * Tachypnea absent    7/26/2022 3:25 PM EDT by Rani Tan      RR: 20, 18, 20, 19, 20, 16    Activity    (X) Activity as tolerated    Routes    (X) Parenteral or oral medications    7/26/2022 3:25 PM EDT by Rani Tan      amiodarone (CORDARONE) tablet 200 mg  Dose: 200 mg  Freq: DAILY Route: PO    apixaban (ELIQUIS) tablet 5 mg  Dose: 5 mg  Freq: 2 TIMES DAILY Route: PO    (X) Diet as tolerated    7/26/2022 3:25 PM EDT by Kurt Preston, [address] [place] [address]; Breakfast, Dinner; Fortified Pudding Oral Supplement    Interventions    (X) WBC    7/26/2022 3:25 PM EDT by Rani Tan      wbc: 3.8    * Milestone   Additional Notes   DATE: 07/24            Pertinent Updates:      Patient is practicing her tongue exercises to get a better diet. She feels good. She denies any CP, SOB, abdominal pain. Her abdominal swelling is down         Vitals:      98.2,  20, 84,  96/54,  on 3L O2, 92% on NC      Abnl/Pertinent Labs/Radiology/Diagnostic Studies:      Wbc: 3.8   Rbc: 3.`14   Hgb: 8.9   Hct: 29.7   Glucose: 124,130, 157, 171, 204   K: 3.4   Cl: 89   CO2: 37   Crea: 1.1               Physical Exam:      Abdomen: soft, non-tender; bowel sounds normal; no masses,  no organomegaly. Anasarca continues to improve               MD Consults/Assessments & Plans:      INT med:    1. Acute hypoxic respiratory failure 2/2 to CAP, transudative pleural effusion likely from HFpEF - stable on minimal oxygen per NC. S/p chest tube insertion, R - removed 7/22/22. 2. Acute on chronic anemia.  Gastritis and duodenitis seen on EGD (7/19/22), no active bleeding and Hgb has been stable. H pylori negative. 3. Mild to moderate oropharyngeal dysphagia still seen on MBS (7/20/22)   4. HTN, low BP this am.    5. RLS, controlled with current management   6. AF, currently in sinus. On BB and amiodarone. 7. DM, insulin-requiring. Improved BS after lantus was decreased. 8. Septic shock, resolved. Abx course completed. 9. Acute encephalopathy, resolved           Plan:   Decrease BB   Continue current insulin regimen   Further diuresis and continuation of acetazolamide as recommended by Nephrology service   Continue ASA and eliquis. Monitor for drop of  hgb, recurrence of bleeding   Continue PPI, carafate   Continue holding digoxin, depakote   May need oxygen on discharge   No change in diet   Appreciate care from all consult services. Nephro:      Assessment       1. Acute kidney injury non oligouric: improved   Baseline cr 0.9   Etiology of SHANTANU due to decrease effective circulating volume in setting of intravascular volume depletion as evident by her need for levophed and her urine lytes with high avidity for cl and Na . Basically bland urine sediment which make GN/vasculitis unlikely   High BUN in part due to steroids   LE edema noted though she is still on levophed       2. Encephalopathy metabolic multifactorial : Improved       3. Digoxin toxicity s/p digbind. Resolved       4. Septic shock 2/2 PNA : resolved       5. Right Pleural Effusion with catheter in place for drainage       6. Edema, marked, multifactorial               Recommendations       Cr stable   Bicarbonate down   Euvolemic       Continue to support hemoydnamics   Pt/ot   Am labs            Pulm:      Assessment: 1. Acute hypoxic hypercapnic respiratory failure 2/2 mod R pleural effusion, possible HAP vs acute decompensated  HFpEF exacerbation (EF 70-75%)   2.  Pulmonary hypertension likely Type II 2/2 acute decompensated diastolic heart failure, in the setting of Hx atorvastatin (LIPITOR) tablet 20 mg   Dose: 20 mg   Freq: DAILY Route: PO      budesonide (PULMICORT) nebulizer suspension 500 mcg   Dose: 0.5 mg   Freq: 2 TIMES DAILY Route: NEBULIZATION      carbidopa-levodopa (SINEMET CR)  MG per extended release tablet 2 tablet   Dose: 2 tablet   Freq: 3 TIMES DAILY Route: PO      insulin glargine-yfgn (SEMGLEE-YFGN) injection vial 5 Units   Dose: 5 Units   Freq: NIGHTLY Route: SC x1      insulin lispro (HUMALOG) injection vial 0-6 Units   Dose: 0-6 Units   Freq: 4 TIMES DAILY BEFORE MEALS & NIGHTLY Route: SC            ipratropium-albuterol (DUONEB) nebulizer solution 1 ampule   Dose: 1 ampule   Freq: EVERY 4 HOURS WHILE AWAKE Route: IN         metoprolol tartrate (LOPRESSOR) tablet 25 mg   Dose: 25 mg   Freq: 2 TIMES DAILY Route: PO      pantoprazole (PROTONIX) tablet 40 mg   Dose: 40 mg   Freq: 2 TIMES DAILY BEFORE MEALS Route: PO      potassium chloride 10 mEq/100 mL IVPB (Peripheral Line)   Dose: 10 mEq   Freq: EVERY HOUR Route: Ivx2      QUEtiapine (SEROQUEL) tablet 25 mg   Dose: 25 mg   Freq: 2 TIMES DAILY Route: PO      rOPINIRole (REQUIP) tablet 1 mg   Dose: 1 mg   Freq: 3 TIMES DAILY Route: PO      sucralfate (CARAFATE) tablet 1 g   Dose: 1 g   Freq: 4 times per day Route: PO      ondansetron (ZOFRAN) injection 4 mg   Dose: 4 mg   Freq: EVERY 6 HOURS PRN Route: IV x1               Orders:      Above meds         PT/OT/SLP/CM Assessments or Notes:      PT:   ASSESSMENT:       Comments: The pt was able to stand from her hospital bed easily, required assistance when standing from the bedside chair. The pt was able to repeat multiple reps of sit<>stand but ambulating.  The pt did report increased BLE weakness and occasional \"buckling\" sensation, no overt LOB noted with ambulation and the pt was able to return to her chair, however she was noted to have episodes of unsteadiness that were thought to be from the \"buckling\" sensation the pt reported in the pt's BLE PLAN:       Patient is making good progress towards established goals. Will continue with current POC. OT:   Overall patient demonstrated decreased independence and safety during completion of ADL/functional transfer/mobility tasks. Pt would benefit from continued skilled OT to increase safety and independence with completion of ADL/IADL tasks for functional independence and quality of life.

## 2022-07-27 NOTE — CARE COORDINATION
Social Work/Case Management Transition of Care Planning (Kaye Wes [place] [phone]):  Pre-cert was obtained this morning. Transportation set up through [facility] with a 3pm . Notified patient and her son, Jayda Diaz. Patient indicated she now does not want to go to [facility]. Explained she was in agreement with the plan and pre-cert was obtained. Explained if she wants to move to a different facility she can do so after discharge. Patient and son expressed understanding. Notified floor nurse and [facility] group of  time. Patient will discharge today to [facility] with a 3pm . Discharge envelope in the soft chart.     ESTHER Bradley  7/27/2022

## 2022-07-27 NOTE — PROGRESS NOTES
[place]  Department of Internal Medicine  Division of Pulmonary, Critical Care and Sleep Medicine  Progress Note    Johan Saenz DO, MPH, Kaitlin Castellano, [address] Мария CRABTREE MD      Patient: Avril Thomas  MRN: [id number]  : 1950    Encounter Time: 4:02 PM     Date of Admission: 2022 12:25 PM    Primary Care Physician: No primary care provider on file. Reason for Consultation: Drain CT management     SUBJECTIVE:  Ok    OBJECTIVE:     PHYSICAL EXAM:   VITALS:   Vitals:    22 0816 22 0836 22 1532 22 1536   BP: 137/74      Pulse: 84      Resp: 18      Temp: 97.6 °F (36.4 °C)      TempSrc: Oral      SpO2: 100% 96% 98% 99%   Weight:       Height:            Intake/Output Summary (Last 24 hours) at 2022 1602  Last data filed at 2022 1440  Gross per 24 hour   Intake 720 ml   Output 3500 ml   Net -2780 ml          CONSTITUTIONAL:    Alert  SKIN:     No rash,   HEENT:     EOMI, MMM, No thrush  NECK:    No bruits, No JVP appreciated  CV:      Sinus,  No murmur, No rubs, No gallops  PULMONARY:   Couse BS,  No Wheezing, No Rales, No Rhonchi      CT noted  ABDOMEN:     Soft, non-tender. BS normal. No R/R/G  EXT:    No deformities . No clubbing. Edema ower extremity edema, No venous stasis  PULSE:   Appears equal and palpable.   PSYCHIATRIC:  Seems appropriate, No acute psychosis  MS:    Gross weakness  NEUROLOGIC:   The clinical assessment is non-focal     DATA: IMAGING & TESTING:     LABORATORY TESTS:    CBC:   Lab Results   Component Value Date/Time    WBC 3.7 2022 04:52 AM    RBC 3.30 2022 04:52 AM    HGB 9.0 2022 04:52 AM    HCT 30.7 2022 04:52 AM    MCV 93.0 2022 04:52 AM    MCH 27.3 2022 04:52 AM    MCHC 29.3 2022 04:52 AM    RDW 18.4 2022 04:52 AM     2022 04:52 AM    MPV 11.4 2022 04:52 AM     CMP:    Lab Results   Component Value Date/Time     07/27/2022 04:52 AM    K 3.8 07/27/2022 04:52 AM    K 5.0 07/06/2022 02:41 AM    CL 95 07/27/2022 04:52 AM    CO2 32 07/27/2022 04:52 AM    BUN 14 07/27/2022 04:52 AM    CREATININE 1.0 07/27/2022 04:52 AM    GFRAA >60 07/27/2022 04:52 AM    LABGLOM 54 07/27/2022 04:52 AM    GLUCOSE 80 07/27/2022 04:52 AM    PROT 6.4 07/27/2022 04:52 AM    LABALBU 4.1 07/27/2022 04:52 AM    CALCIUM 9.1 07/27/2022 04:52 AM    BILITOT 1.0 07/27/2022 04:52 AM    ALKPHOS 89 07/27/2022 04:52 AM    AST 10 07/27/2022 04:52 AM    ALT <5 07/27/2022 04:52 AM     Calcium:    Lab Results   Component Value Date/Time    CALCIUM 9.1 07/27/2022 04:52 AM     Ionized Calcium:  No results found for: IONCA  Magnesium:    Lab Results   Component Value Date/Time    MG 2.1 07/27/2022 04:52 AM     Phosphorus:    Lab Results   Component Value Date/Time    PHOS 3.9 07/27/2022 04:52 AM     PT/INR:    Lab Results   Component Value Date/Time    PROTIME 20.9 07/21/2022 04:34 AM    INR 1.9 07/21/2022 04:34 AM        PRO-BNP:   Lab Results   Component Value Date    PROBNP 37,248 (H) 07/12/2022    PROBNP 41,368 (H) 07/06/2022      ABGs:   Lab Results   Component Value Date/Time    PH 7.452 07/11/2022 05:07 AM    PO2 133.6 07/11/2022 05:07 AM    PCO2 40.0 07/11/2022 05:07 AM     Hemoglobin A1C: No components found for: HGBA1C    IMAGING:  Imaging tests were completed and reviewed and discussed radiology and care team involved and reveals   Echo Complete    Result Date: 7/7/2022  Findings   Left Ventricle  Micro-bubble contrast injected to enhance left ventricular visualization. Normal left ventricular size and systolic function. Ejection fraction is visually estimated at 70-75%. Indeterminate diastolic function. No regional wall motion abnormalities seen. Mild left ventricular concentric hypertrophy noted. Right Ventricle  Moderately dilated right ventricle. Right ventricle global systolic function is reduced. TAPSE 1.1 cm.    Left Atrium  The left atrium is moderate-severely dilated. JUANY 48 ml/m2. The interatrial septum appears intact. Right Atrium  Mildly enlarged right atrium. Mitral Valve  Structurally normal mitral valve. No evidence of mitral valve stenosis. Physiologic mitral regurgitation. Tricuspid Valve  The tricuspid valve appears structurally normal.  Mild tricuspid regurgitation. RVSP is at least 60 mmHg. Aortic Valve  Individual aortic valve leaflets are not clearly visualized. No hemodynamically significant aortic stenosis is present. No evidence of aortic valve regurgitation. Pulmonic Valve  The pulmonic valve was not well visualized. No evidence of pulmonic valve stenosis. No evidence of any pulmonic regurgitation. Pericardial Effusion  Prominent pericardial fat pad. No definitive evidence of pericardial effusion. Aorta  Aortic root dimension within normal limits. Miscellaneous  Dilated Inferior Vena Cava. Conclusions   Summary  Technically difficult study - limited visualization. Micro-bubble contrast injected to enhance left ventricular visualization. Normal left ventricular size and systolic function. Ejection fraction is visually estimated at 70-75%. Indeterminate diastolic function. No regional wall motion abnormalities seen. Mild left ventricular concentric hypertrophy noted. Moderately dilated right ventricle with reduced function. Biatrial dilation. Mild tricuspid regurgitation. RVSP is at least 60 mmHg. Prominent pericardial fat pad. No definitive evidence of pericardial effusion. Result Date: 7/5/2022  FINDINGS: Lower Chest: See the CT report of the chest dated the same date for full details. Organs: The liver is grossly unremarkable. The spleen is unremarkable. Gallbladder is been surgically removed. The pancreas reveals some mild fatty replacement. Both adrenal glands are within normal limits. The kidneys are unremarkable in appearance.  GI/Bowel: Stomach is within normal limits. No mucosal abnormality. Stool seen scattered diffusely throughout the colon. There is no wall thickening. No mucosal abnormality or distension of the small bowel. Pelvis: Pelvic organs reveal mild wall thickening of the bladder with incomplete distension. The uterus is grossly unremarkable. Peritoneum/Retroperitoneum: There is no abdominal retroperitoneal lymphadenopathy. There is mild stranding identified in the root of the mesentery as well as surrounding the pancreas in which mild inflammation of the pancreas cannot be completely excluded. Correlation to clinical lab values is recommended. No pelvic adenopathy with moderate atherosclerotic disease seen within the abdominal aorta and iliac vessels. Free fluid identified throughout the upper abdomen. Extensive vascular calcifications seen within the mesenteric vessels as well as the abdominal aorta. Bones/Soft Tissues: The bony structures reveal extensive degenerative changes seen within the spine and pelvis. Severe degenerative changes seen within the sacroiliac joints bilaterally right greater than left some sclerosis on the right to suggest prior healed sacroiliitis. There is extensive anasarca seen within the subcutaneous tissues. Increasing fluid within the abdomen when compared to the prior study. The anasarca is also increased in appearance of the prior examination. Mild stranding seen around the mesentery which correlation to clinical lab values is recommended to exclude possible pancreatitis or volume overload. Overall the appearance of the pancreas itself is grossly unremarkable. There is only mild stranding seen throughout the mesenteric root. CT HEAD WO CONTRAST  Result Date: 7/5/2022  No acute intracranial abnormality or significant change in the overall appearance of the brain.  MRI would be useful if symptoms persist. RECOMMENDATIONS: Unavailable     CT CHEST WO CONTRAST    Result Date: 7/5/2022  FINDINGS: received local anesthesia. The patient was monitored for 60 minutes by a registered nurse. Successful uncomplicated  right chest tube placement Recommendations: 1. Follow-up tube check in 2 weeks 2. Flush tube daily with 5 to 10 mL of sterile saline     MRI BRAIN WO CONTRAST  Result Date: 7/8/2022  FINDINGS: INTRACRANIAL STRUCTURES/VENTRICLES: There is no acute infarct. No mass effect, edema or hemorrhage is seen. Mild volume loss is seen in the cerebrum with mild chronic microvascular ischemic changes. No hydrocephalus or extra-axial fluid is seen. ORBITS: Prosthetic lenses are seen in the globes bilaterally. The orbits are otherwise grossly unremarkable. SINUSES: Mild mucosal thickening in the paranasal sinuses. There is pooling of secretions in the nasopharynx. Moderate bilateral mastoid effusions. BONES/SOFT TISSUES: The bone marrow signal intensity appears normal. The soft tissues demonstrate no acute abnormality. 1.  No acute intracranial abnormality. 2. No gross epileptogenic lesion is identified. 3. Bilateral mastoid effusions, which may be due to eustachian tube dysfunction or otomastoiditis. RECOMMENDATIONS: Unavailable     US DUP LOWER EXTREMITIES BILATERAL VENOUS  Within the visualized vessels there is no evidence for deep venous thrombosis       Assessment: 1. Acute hypoxic hypercapnic respiratory failure 2/2 mod R pleural effusion, possible HAP vs acute decompensated  HFpEF exacerbation (EF 70-75%)  2. Pulmonary hypertension likely Type II 2/2 acute decompensated diastolic heart failure, in the setting of Hx SERENA  3. Moderate R pleural effusion, likely 2/2 HAP (Klebsiella pneumoniae) vs acute decompensated heart failure. D4 CT, 250 cc output in 24 hrs  4. HAP (Klebsiella pneumoniae on resp culture, light growth). D1 cefepime  5. Elevated pro-BNP, multifactorial, likely acute decompensated HFpEF, septic shock. 6. Elevated troponin likely 2/2 demand ischemia  7.  Permanent atrial fibrillation, s/p DCCV (April and May 2022), now rate controlled. 8. SHANTANU Stage III on CKD multifactorial, pre-renal (hemodynamically mediated, DHN 2/2 diuretic use, poor oral intake); vs ATN 2/2 septic shock. 9. Hypernatremia likely 2/2 over diuresis  10. Metabolic alkalosis likely contraction alkalosis 2/2 diuresis, poor oral intake  11. Pancytopenia likely 2/2 BM suppression 2/2 chronic illness, meds (Zosyn)  12. Acute on chronic macrocytic anemia, multifactorial, likely 2/2 chronic disease, blood draws,  Less likely hemolysis (no schistocytes, haptoglobin wnl) s/p 1 unit PRBC 7/11, FOBT positive today, with pinkish tinge on chest tube output  13. Thrombocytopenia,likely 2/2 #11, less likely HIT (not exposed to heparin products), DIC, hemolysis ruled out (work-up negative)  14. Prolonged INR, ? HFP wnl, no overt bleeding, apixaban and aspirin on hold. Given Vit K yesterday for INR 3.2>2 today  15. Low grade fever, multifactorial, ? meds (Precedex?), CLABSI?, no new leukocytosis, or hemodynamic instability   16. Hx of asthma  17. Hx of SERENA. Not using CPAP at home  18. Hx of HTN. -160`s  19. Hx of CAD  20. Hx of Type 2 DM with neuropathy. On Lantus 10 and Novolog SS at home  21. Hx of depression/anxiety. 22. Hx of restless leg syndrome  23. Hx of Parkinson's disease.  On Sinemet and ropinirole at home, which was discontinued upon discharge at [address]:   Wean oxygen  Placement pending        Zoila Miller DO DO, MPH, [facility]  Professor of Internal Medicine  Pulmonary, Critical Care and Sleep Medicine

## 2022-07-27 NOTE — CARE COORDINATION
Received call from Chary Foster at [facility] [address], [address] stating patient was due for her annual [employer] waiver eval. She requested medication list to be faxed to [phone] (completed). CM transferred Shannan to patient's room phone to speak with her.     LUIS ENRIQUE MccormickN, RN  [facility] Case Management   Cell: [phone]

## 2022-07-27 NOTE — PLAN OF CARE
Problem: Chronic Conditions and Co-morbidities  Goal: Patient's chronic conditions and co-morbidity symptoms are monitored and maintained or improved  Outcome: Completed  Flowsheets (Taken 7/27/2022 0816)  Care Plan - Patient's Chronic Conditions and Co-Morbidity Symptoms are Monitored and Maintained or Improved: Monitor and assess patient's chronic conditions and comorbid symptoms for stability, deterioration, or improvement     Problem: Discharge Planning  Goal: Discharge to home or other facility with appropriate resources  Outcome: Completed  Flowsheets (Taken 7/27/2022 0816)  Discharge to home or other facility with appropriate resources:   Arrange for needed discharge resources and transportation as appropriate   Refer to discharge planning if patient needs post-hospital services based on physician order or complex needs related to functional status, cognitive ability or social support system     Problem: Pain  Goal: Verbalizes/displays adequate comfort level or baseline comfort level  Outcome: Completed     Problem: Skin/Tissue Integrity  Goal: Absence of new skin breakdown  Description: 1. Monitor for areas of redness and/or skin breakdown  2. Assess vascular access sites hourly  3. Every 4-6 hours minimum:  Change oxygen saturation probe site  4. Every 4-6 hours:  If on nasal continuous positive airway pressure, respiratory therapy assess nares and determine need for appliance change or resting period.   Outcome: Completed     Problem: ABCDS Injury Assessment  Goal: Absence of physical injury  Outcome: Completed     Problem: Safety - Adult  Goal: Free from fall injury  Outcome: Completed     Problem: Respiratory - Adult  Goal: Achieves optimal ventilation and oxygenation  Outcome: Completed  Flowsheets (Taken 7/27/2022 0816)  Achieves optimal ventilation and oxygenation:   Position to facilitate oxygenation and minimize respiratory effort   Oxygen supplementation based on oxygen saturation or arterial blood gases     Problem: Nutrition Deficit:  Goal: Optimize nutritional status  Outcome: Completed     Problem: Cardiovascular - Adult  Goal: Maintains optimal cardiac output and hemodynamic stability  Outcome: Completed  Flowsheets (Taken 7/27/2022 0816)  Maintains optimal cardiac output and hemodynamic stability: Monitor blood pressure and heart rate     Problem: Metabolic/Fluid and Electrolytes - Adult  Goal: Hemodynamic stability and optimal renal function maintained  Outcome: Completed  Flowsheets (Taken 7/27/2022 0816)  Hemodynamic stability and optimal renal function maintained: Monitor labs and assess for signs and symptoms of volume excess or deficit     Problem: Neurosensory - Adult  Goal: Absence of seizures  Outcome: Completed     Problem: Skin/Tissue Integrity - Adult  Goal: Skin integrity remains intact  Outcome: Completed  Flowsheets (Taken 7/27/2022 0816)  Skin Integrity Remains Intact: Monitor for areas of redness and/or skin breakdown     Problem: Musculoskeletal - Adult  Goal: Return mobility to safest level of function  Outcome: Completed  Flowsheets (Taken 7/27/2022 0816)  Return Mobility to Safest Level of Function:   Assess patient stability and activity tolerance for standing, transferring and ambulating with or without assistive devices   Assist with transfers and ambulation using safe patient handling equipment as needed     Problem: Genitourinary - Adult  Goal: Absence of urinary retention  Outcome: Completed  Flowsheets (Taken 7/27/2022 0816)  Absence of urinary retention: Monitor intake/output and perform bladder scan as needed     Problem: Infection - Adult  Goal: Absence of infection during hospitalization  Outcome: Completed  Flowsheets (Taken 7/27/2022 0816)  Absence of infection during hospitalization: Assess and monitor for signs and symptoms of infection

## 2022-07-27 NOTE — DISCHARGE SUMMARY
[address]  Discharge Summary    PCP: No primary care provider on file. Admit Date:7/5/2022  Discharge Date: 7/27/2022    Chief Complaint   Patient presents with    Altered Mental Status     per ems family reports ams x 45 minutes, recent psych med changes and right toe amputation       Admission Diagnosis:   Acute encephalopathy  Acute hypoxic hypercapnic respiratory failure   Septic shock  HAGMA  Elevated troponin   Digoxin toxicity  Hx Parkinson  Hx restless leg syndrome  Hx depression and anxiety  Hx permanent A-fib  Hx HTN  Hx HFpEF  Hx CAD  Hx HLD  Anasarca   Pleural effusion   Hx asthma  Hx SERENA  Acute pancreatitis   SHANTANU stage III on CKD   Hyperkalemia     Discharge Diagnosis:  Acute encephalopathy - resolved  Acute hypoxic hypercapnic respiratory failure - improved   Septic shock - resolved  HAGMA - resolved  Elevated troponin d/t demand ischemia   Digoxin toxicity - resolved  Hx Parkinson - stable   Hx restless leg syndrome - stable  Hx depression and anxiety - stable  Hx permanent A-fib - stable   Hx HTN - stable   Hx HFpEF - resolved   Hx CAD - stable  Hx HLD   Anasarca - improving   Pleural effusion - improving   Hx asthma - stable  Hx SERENA   Acute pancreatitis - resolved  Oropharyngeal dysphagia - improving  SHANTANU stage III on CKD - resolved  Metabolic alkalosis - resolved   Hyperkalemia - resolved  Acute on chronic anemia - stable   Candidal intertrigo   Hypokalemia - resolved    Hospital Course:   Briefly, Ed Kim is a 65 y/o female who was admitted for acute hypoxic hypercapnic resp failure and altered mental status likely 2/2 pleural effusion, possible HPA and acute decompensated HFpEF exacerbation (EF 70-75%), and septic shock likely secondary to pneumonia, hypernatremia and lactic acidosis. She was intubated and put in the ICU and extubated after improvement on 7/10/2022.      Patient has completed a 10-day course of cefepime 2000mg IV q12hr and vancomycin for possible HAP. On admission patient had toxic levels of digoxin and she received digibind, she will not continue digoxin on discharge. Patient had an episode of melena and was FOBT positive. Patient Hb dropped to 6.7 which required a packed RBC on 7/17. GS performed EGS (7/19) showing moderate diffuse gastritis and duodenitis with a negative H. Pylori serum test. The patient's Hb has been stable since. She will continue to take PPI and carafate on discharge. A chest tube was placed 7/8 and removed on the 7/21 to drain a pleural effusion likely due to heart failure. Pleural fluid analysis indicated transudative effusion. Patient has been discharged on Bumex 1mg PO MWF. Patient has candidal intertrigo under her breasts, pannus and bilateral inguinal areas and has been given powder miconazole to manage it. Patient develop dysphagia and has been improving to where she is now capable of eating soft foods with regular liquids but no straw. On discharge continue glargine 5 units SQ nightly and insulin lispro SQ 4X daily AC and HS. Continue to watch her blood glucose as it could use adjusting when her diet gets more regular. The patient had episodes of mild hypotension but is stable on current meds now and should continue taking the reduced metoprolol tartrate 12.5mg. For the permanent A-fib patient should continue Eliquis 5mg BID and amiodarone 200mg. Patient should also continue ASA 81mg for the history of CAD. On day of discharge :   Subjective:  Patient is a overall very happy today because she finally got a non-pureed or liquid diet last night. She denies any fever or chills. Chest pain or shortness of breath. Patient denies any abdominal pain, no nausea, diarrhea or constipation. She is motivated to keep practicing her laryngeal strengthening exercises so she can keep eating the foods she wants.      Review of Systems   Constitutional:  Negative for appetite change, chills, diaphoresis, fatigue and fever. Eyes:  Negative for discharge and redness. Respiratory:  Negative for cough, choking, chest tightness and shortness of breath. Cardiovascular:  Negative for chest pain, palpitations and leg swelling. Gastrointestinal:  Negative for abdominal distention, abdominal pain, constipation, diarrhea, nausea and vomiting. Neurological:  Negative for dizziness, speech difficulty (Improving), weakness, light-headedness and headaches. Psychiatric/Behavioral:  Negative for agitation and confusion. The patient is not nervous/anxious. Significant findings (history and exam, laboratory, radiological, pathology, other tests):   General Appearance: alert, appears stated age, and cooperative  HEENT:  Head: Normocephalic, no lesions, without obvious abnormality. Neck: no JVD  Lung: clear to auscultation bilaterally  Heart: regular rate and rhythm, S1, S2 normal, no murmur, click, rub or gallop  Abdomen: soft, non-tender; bowel sounds normal; no masses,  no organomegaly  Extremities:  extremities normal, atraumatic, no cyanosis or edema  Musculokeletal: No joint swelling, no muscle tenderness. ROM normal in all joints of extremities. Neurologic: Mental status: Alert, oriented, thought content appropriate  Candidal intertrigo - present under both breasts and especially bad in the left breast with cottage cheese appearance. It is also present under the panus bilaterally. It is non-painful but itchy. It appears red and has some mild bleeding 2/2 patient scratching it.     Pending test results: none    Consults:  Critical care  Pulmonology  Palliative  Cardiology  Nephrology  GS    Procedures:  Intubation  Arterial line  EEG  Central line   Chest tube     Condition at discharge: Stable    Disposition: [place]     Time taken for discharge : < 30 mins [] >30 mins [x]    Discharge Medications:  Current Discharge Medication List        START taking these medications    Details   bumetanide (BUMEX) 1 MG tablet Take 1 tablet every Monday, Wednesday, Friday  Qty: 30 tablet, Refills: 3      miconazole (MICOTIN) 2 % powder Apply topically 2 times daily. Qty: 45 g, Refills: 1      amiodarone (CORDARONE) 200 MG tablet Take 1 tablet by mouth in the morning. budesonide (PULMICORT) 0.5 MG/2ML nebulizer suspension Take 2 mLs by nebulization in the morning and 2 mLs in the evening. Qty: 60 each, Refills: 3      insulin glargine-yfgn (SEMGLEE-YFGN) 100 UNIT/ML injection vial Inject 5 Units into the skin nightly      carbidopa-levodopa (SINEMET CR)  MG per extended release tablet Take 2 tablets by mouth in the morning and 2 tablets at noon and 2 tablets in the evening. menthol-zinc oxide (CALMOSEPTINE) 0.44-20.6 % OINT ointment Apply topically in the morning and at bedtime for 7 days Max 30 ml per day. Refills: 4      melatonin 5 MG TBDP disintegrating tablet Take 1 tablet by mouth nightly      sucralfate (CARAFATE) 1 GM tablet Take 1 tablet by mouth in the morning and 1 tablet at noon and 1 tablet in the evening and 1 tablet before bedtime. Qty: 120 tablet, Refills: 1      pantoprazole (PROTONIX) 40 MG tablet Take 1 tablet by mouth in the morning and 1 tablet in the evening. Take before meals. Qty: 60 tablet, Refills: 1           CONTINUE these medications which have CHANGED    Details   metoprolol tartrate (LOPRESSOR) 25 MG tablet Take 0.5 tablets by mouth in the morning and 0.5 tablets before bedtime. Qty: 60 tablet, Refills: 3      ipratropium-albuterol (DUONEB) 0.5-2.5 (3) MG/3ML SOLN nebulizer solution Inhale 3 mLs into the lungs every 4 hours (while awake)  Qty: 360 mL      apixaban (ELIQUIS) 5 MG TABS tablet Take 1 tablet by mouth in the morning and 1 tablet before bedtime. Qty: 60 tablet      rOPINIRole (REQUIP) 1 MG tablet Take 1 tablet by mouth in the morning and 1 tablet at noon and 1 tablet before bedtime.   Qty: 90 tablet, Refills: 3      QUEtiapine (SEROQUEL) 25 MG tablet Take 1 tablet by mouth in the morning and 1 tablet before bedtime.   Qty: 60 tablet, Refills: 3           CONTINUE these medications which have NOT CHANGED    Details   aspirin EC 81 MG EC tablet Take 1 tablet by mouth daily  Qty: 30 tablet, Refills: 0      montelukast (SINGULAIR) 10 MG tablet Take 10 mg by mouth nightly      atorvastatin (LIPITOR) 20 MG tablet Take 1 tablet by mouth daily  Qty: 90 tablet, Refills: 3    Comments: **Patient requests 90 days supply**      insulin aspart (NOVOLOG FLEXPEN) 100 UNIT/ML injection pen INJECT 0-10 UNITS INTO THE SKIN THREE TIMES DAILY(BEFORE MEALS) SLIDING SCALE (MAX DAILY 30 UNITS)  Qty: 5 pen, Refills: 3           STOP taking these medications       divalproex (DEPAKOTE) 250 MG DR tablet Comments:   Reason for Stopping:         ferrous sulfate (IRON 325) 325 (65 Fe) MG tablet Comments:   Reason for Stopping:         linezolid (ZYVOX) 600 MG tablet Comments:   Reason for Stopping:         cephALEXin (KEFLEX) 500 MG capsule Comments:   Reason for Stopping:         furosemide (LASIX) 20 MG tablet Comments:   Reason for Stopping:         digoxin (LANOXIN) 125 MCG tablet Comments:   Reason for Stopping:         venlafaxine (EFFEXOR XR) 75 MG extended release capsule Comments:   Reason for Stopping:         melatonin 3 MG TABS tablet Comments:   Reason for Stopping:         insulin glargine (LANTUS) 100 UNIT/ML injection vial Comments:   Reason for Stopping:         lidocaine 4 % external patch Comments:   Reason for Stopping:         budesonide-formoterol (SYMBICORT) 160-4.5 MCG/ACT AERO Comments:   Reason for Stopping:         albuterol sulfate HFA (PROVENTIL HFA) 108 (90 Base) MCG/ACT inhaler Comments:   Reason for Stopping:         carbidopa-levodopa (SINEMET CR)  MG per extended release tablet Comments:   Reason for Stopping:         nitroGLYCERIN (NITROSTAT) 0.4 MG SL tablet Comments:   Reason for Stopping:         busPIRone (BUSPAR) 5 MG tablet Comments:   Reason for Stopping:         Cholecalciferol (VITAMIN D) 25 MCG TABS Comments:   Reason for Stopping:         omeprazole (PRILOSEC) 40 MG delayed release capsule Comments:   Reason for Stopping:         blood glucose test strips (ACCU-CHEK RIGOBERTO PLUS) strip Comments:   Reason for Stopping:         lamoTRIgine (LAMICTAL) 200 MG tablet Comments:   Reason for Stopping:               My Personal Risk Factors  Anyone can get sepsis, but some people are at higher risk than others. These risk factors include: Over the age of 72, Immunocompromised (history of cancer/taking immunosuppressants), Chronic illness (diabetes, COPD, CHF, chronic renal failure,liver disease), Trauma/burns/open wounds and History of infection or sepsis  My Prevention Measures  The best way to prevent sepsis is to prevent infections. You can lower your risk by:  Getting vaccinations and immunizations, Practicing good hand hygiene. Use soap and water or hand  especially before handling catheters, Finishing all your antibiotics if you have an infection, Calling your doctor if you have signs and symptoms of an infection, Controlling your blood sugar, Taking all of your medications as prescribed and For females, wiping from front to back after voiding    Gastritis: Care Instructions  Your Care Instructions     Gastritis is a sore and upset stomach. It happens when something irritates the stomach lining. Many things can cause it. These include an infection such as the flu or something you ate or drank. Medicines or a sore on the lining of the stomach (ulcer) also can cause it. Your belly may bloat and ache. You maybelch, vomit, and feel sick to your stomach. You should be able to relieve the problem by taking medicine. And it may helpto change your diet. If gastritis lasts, your doctor may prescribe medicine. Follow-up care is a key part of your treatment and safety.  Be sure to make and go to all appointments, and call your doctor if you are having problems. It's also a good idea to know your test results and keep alist of the medicines you take. How can you care for yourself at home? If your doctor prescribed antibiotics, take them as directed. Do not stop taking them just because you feel better. You need to take the full course of antibiotics. Be safe with medicines. If your doctor prescribed medicine to decrease stomach acid, take it as directed. Call your doctor if you think you are having a problem with your medicine. Do not take any other medicine, including over-the-counter pain relievers, without talking to your doctor first.  If your doctor recommends over-the-counter medicine to reduce stomach acid, such as Pepcid AC (famotidine), Prilosec (omeprazole), or Tagamet HB (cimetidine) follow the directions on the label. Drink plenty of fluids to prevent dehydration. Choose water and other clear liquids. If you have kidney, heart, or liver disease and have to limit fluids, talk with your doctor before you increase the amount of fluids you drink. Avoid foods that make your symptoms worse. These may include chocolate, mint, alcohol, pepper, spicy foods, high-fat foods, or drinks with caffeine in them, such as tea, coffee, nathan, or energy drinks. If your symptoms are worse after you eat a certain food, you may want to stop eating it to see if your symptoms get better. When should you call for help? Call 911 anytime you think you may need emergency care. For example, call if:    You vomit blood or what looks like coffee grounds. You pass maroon or very bloody stools. Call your doctor now or seek immediate medical care if:    You start breathing fast and have not produced urine in the last 8 hours. You cannot keep fluids down. Watch closely for changes in your health, and be sure to contact your doctor if:    You do not get better as expected. Where can you learn more? Go to [email or web address]://[email or web address] [email or web address] org and sign in to your [employer] account. Enter [id number] in the [facility] box to learn more about \"Gastritis: Care Instructions. \"     If you do not have an account, please click on the \"Sign Up Now\" link. Current as of: September 8, 2021               Content Version: 13.3  © 2949-2210 Healthwise, [employer] [facility]. Care instructions adapted under license by [facility] ([facility]). If you have questions about a medical condition or this instruction, always ask your healthcare professional. [place] [address] any warranty or liability for your use of this information. [facility] Internal Medicine Resident Service    Activity as tolerated  Diet: diabetic  Be compliant with your medications and take them as prescribed. Hospital Follow up: [id number] [address] with House Team   Call to confirm appointment Tel: [phone] ([address]  Internal Medicine Clinic)     Other Follow-Ups:    Future Appointments   Date Time Provider Prateek Nassar   8/16/2022  3:20 PM RASHARD Flaherty - CNP [facility]       Other than any new prescriptions given to you today, the list of home going meds on this After Visit Summary are based on information provided to us from you. This information, including the list, dose, and frequency of medications is only as accurate as the information you provided. If you have any questions or concerns about your home medications, please contact your Primary Care Physician for further clarification. Greta Ayala MD  PGY 1   2:47 PM 7/27/2022    Senior Resident Statement  I have seen and examined the patient with the intern. I have discussed the case, including pertinent history and exam findings with the intern. I agree with the assessment, plan and orders as documented by the intern.      Collette Swanson MD PGY- 2  Electronically signed by Collette Swanson MD on 7/27/2022 at 5:17 PM    Attending physician: Dr. Cl Elliott

## 2022-07-27 NOTE — PROGRESS NOTES
[facility]  Internal Medicine Residency / [address]    Attending Physician Statement  I have discussed the case, including pertinent history and exam findings with the resident and the team.  I have seen and examined the patient and the key elements of the encounter have been performed by me. I have also personally reviewed imaging studies and labs. I agree with the assessment, plan and orders as documented by the resident. Doing well this morning. Still on minimal oxygen per NC, down to 1L NC. BP has been stable after adding lower dose of BB. Clear breath sounds. Dressing over previous CT site is clean. Minimal sacral edema. UO remains clear and adequate. Assessment:  SHANTANU, resolved  Metabolic alkalosis, improved with diamox  Acute hypoxic respiratory failure 2/2 to CAP, transudative pleural effusion likely from HFpEF - stable on minimal oxygen per NC (2L). S/p chest tube insertion, R - removed 7/22/22. Currently on bumex MWF only. Chronic anemia, stable. Gastritis and duodenitis seen on EGD (7/19/22), no active bleeding and Hgb has been stable. H pylori negative. Mild to moderate oropharyngeal dysphagia still seen on MBS (7/20/22) - tolerating soft diet  HTN, stable on current medications  RLS, controlled with current management  AF, currently in sinus. No further bleeding after resuming apixaban. DM, insulin-requiring, controlled  Septic shock, resolved. Abx course completed. Acute encephalopathy, resolved  Dysphagia  Intertrigo (under breasts, under pannus, bilateral inguinal areas)          Plan:   For discharge today to [address] [place] [address] current insulin regimen  Continue current lowered dose of BB  Bumex 1mg MWF  Continue ASA and eliquis  Continue PPI, carafate  Continue holding digoxin, depakote on discharge  Diet as recommended by SLP (soft foods with regular liquids and no straw)  Monitor BS as her diet improves, may need to continue titrating insulin  Miconazole powder for intertrigo  Appreciate care from all consult services. Remainder of medical problems as per resident note. Prince Prasad Olivera MD  Internal Medicine

## 2022-07-27 NOTE — PROGRESS NOTES
[facility] MD Presley Shah MD Cordella Durand, MD .  Progress Note      Patient's Name: Bradley Nunez  11:56 AM  7/27/2022    Subjective  7/7 : seen in her room intubated mechanically ventilated fio2 40 . LE edema 1-2+ . On levophed . UO ok over last 24 hours     7/8 pt not in her room when coming to see her . She is in IR lab. Case d/w RN     7/9 seen in her room d/w ICU resident   Still on some pressors actually BP low when seeing her   Has CT in place with good drainage . Good UO in response to lasix via genao cath . Fio2 40 PEEP 8       7/10 seen in ICU intubated mechanically ventilated fio2 40 PEEP 8   1-2+ edema in UE and LEs   No pressors  On precedex drip     7/11 : seen in ICU extubated earlier today . BP stable on HFNC    7/12 seen in her room ,extubated , on o2/nc . Chest tube in place . Fevers today and precedex drip put on hold .good UO over last 24 hours     7/13 : seen in her room on o2/nc BP stable clinically doing better still with CT along with genao and fecal system    7/14 sitting in chair comfortable on o2/nc BP stable LE edema dependent . 7/15 ; weak on o2/nc . 7/16: In better spirits today. Denies dyspnea on nasal cannula. Receiving breathing treatment. Ongoing fatigue, generalized weakness and debilitation. Good appetite. Continued marked swelling. Asks about eating potato chips, discussed why this would be a bad idea. 7/18: Orthopnea. Unable to lay flat for EGD. Occasional wheezing. No dyspnea at rest at 30 degrees on nasal cannula. Swelling in her thighs sacrum and abdominal pannus seem worse to her. Very little distal swelling. Hungry and thirsty. Ongoing fatigue and generalized weakness. No chest pain or palpitations    7/19: Sleepy, somnolent though easily  awakened. Thinks her swelling may be a little bit better. 7/20: Feeling better.   No dyspnea at rest.  Abdominal pannus swelling seems to be a little TID  benzocaine-menthol (CEPACOL SORE THROAT) lozenge 1 lozenge, Q2H PRN  QUEtiapine (SEROQUEL) tablet 25 mg, BID  melatonin disintegrating tablet 5 mg, Nightly  labetalol (NORMODYNE;TRANDATE) injection 10 mg, Q6H PRN  hydrALAZINE (APRESOLINE) injection 10 mg, Q6H PRN  amiodarone (CORDARONE) tablet 200 mg, Daily  insulin lispro (HUMALOG) injection vial 0-6 Units, 4x Daily AC & HS  carbidopa-levodopa (SINEMET CR)  MG per extended release tablet 2 tablet, TID  ondansetron (ZOFRAN) injection 4 mg, Q6H PRN  budesonide (PULMICORT) nebulizer suspension 500 mcg, BID  levalbuterol (XOPENEX) nebulization 0.63 mg, Q4H PRN  ipratropium-albuterol (DUONEB) nebulizer solution 1 ampule, Q4H WA  polyethylene glycol (GLYCOLAX) packet 17 g, BID  atorvastatin (LIPITOR) tablet 20 mg, Daily  sodium chloride flush 0.9 % injection 5-40 mL, 2 times per day  0.9 % sodium chloride infusion, PRN  acetaminophen (TYLENOL) tablet 650 mg, Q6H PRN   Or  acetaminophen (TYLENOL) suppository 650 mg, Q6H PRN  glucose chewable tablet 16 g, PRN  dextrose bolus 10% 125 mL, PRN   Or  dextrose bolus 10% 250 mL, PRN  glucagon (rDNA) injection 1 mg, PRN  dextrose 5 % solution, PRN      Review of Systems:   Unable to obtain due to clinical conditon . Physical exam:   Vital signs /74   Pulse 84   Temp 97.6 °F (36.4 °C) (Oral)   Resp 18   Ht 5' (1.524 m)   Wt 202 lb 9.6 oz (91.9 kg)   SpO2 96%   BMI 39.57 kg/m²   Gen : moderate distress  Head : at , nc   Neck : supple , no thyromegaly noted . Eyes : EOMI , PERRLA   CV : tachycardiac 1+ edema in LE   Lungs: good flow heard b/l   Abd : soft , NT , BS + , No Organomegaly appreciated . Skin : soft, dry . Extremities: 2+ pitting edema lower back sacrum and thighs and inferior abdominal pannus, though no swelling in her distal lower and upper extremities  Neuro : CN  II-XII grossly intact , no focal neurologic deficit . Psych : cooperative .      Data:   Labs:  CBC with Differential: Lab Results   Component Value Date/Time    WBC 3.7 07/27/2022 04:52 AM    RBC 3.30 07/27/2022 04:52 AM    HGB 9.0 07/27/2022 04:52 AM    HCT 30.7 07/27/2022 04:52 AM     07/27/2022 04:52 AM    MCV 93.0 07/27/2022 04:52 AM    MCH 27.3 07/27/2022 04:52 AM    MCHC 29.3 07/27/2022 04:52 AM    RDW 18.4 07/27/2022 04:52 AM    NRBC 0.0 03/15/2019 09:10 AM    SEGSPCT 74 08/20/2013 10:45 AM    METASPCT 0.9 07/14/2022 05:31 AM    LYMPHOPCT 22.8 07/27/2022 04:52 AM    MONOPCT 9.0 07/27/2022 04:52 AM    MYELOPCT 2.6 07/14/2022 05:31 AM    EOSPCT 1 06/16/2017 12:00 AM    BASOPCT 1.1 07/27/2022 04:52 AM    MONOSABS 0.33 07/27/2022 04:52 AM    LYMPHSABS 0.84 07/27/2022 04:52 AM    EOSABS 0.09 07/27/2022 04:52 AM    BASOSABS 0.04 07/27/2022 04:52 AM     CMP:    Lab Results   Component Value Date/Time     07/27/2022 04:52 AM    K 3.8 07/27/2022 04:52 AM    K 5.0 07/06/2022 02:41 AM    CL 95 07/27/2022 04:52 AM    CO2 32 07/27/2022 04:52 AM    BUN 14 07/27/2022 04:52 AM    CREATININE 1.0 07/27/2022 04:52 AM    GFRAA >60 07/27/2022 04:52 AM    LABGLOM 54 07/27/2022 04:52 AM    GLUCOSE 80 07/27/2022 04:52 AM    PROT 6.4 07/27/2022 04:52 AM    LABALBU 4.1 07/27/2022 04:52 AM    CALCIUM 9.1 07/27/2022 04:52 AM    BILITOT 1.0 07/27/2022 04:52 AM    ALKPHOS 89 07/27/2022 04:52 AM    AST 10 07/27/2022 04:52 AM    ALT <5 07/27/2022 04:52 AM     Ionized Calcium:  No results found for: IONCA  Magnesium:    Lab Results   Component Value Date/Time    MG 2.1 07/27/2022 04:52 AM     Phosphorus:    Lab Results   Component Value Date/Time    PHOS 3.9 07/27/2022 04:52 AM     U/A:    Lab Results   Component Value Date/Time    COLORU Yellow 07/05/2022 05:43 PM    PHUR 5.5 07/05/2022 05:43 PM    WBCUA 1-3 07/05/2022 05:43 PM    RBCUA NONE 07/05/2022 05:43 PM    RBCUA NONE 03/25/2013 09:00 PM    YEAST RARE 10/27/2015 07:18 PM    BACTERIA FEW 07/05/2022 05:43 PM    CLARITYU Clear 07/05/2022 05:43 PM    SPECGRAV 1.025 07/05/2022 05:43 PM LEUKOCYTESUR Negative 07/05/2022 05:43 PM    UROBILINOGEN 0.2 07/05/2022 05:43 PM    BILIRUBINUR Negative 07/05/2022 05:43 PM    BLOODU Negative 07/05/2022 05:43 PM    GLUCOSEU Negative 07/05/2022 05:43 PM     Microalbumen/Creatinine ratio:  No components found for: RUCREAT  Iron Saturation:  No components found for: PERCENTFE  TIBC:    Lab Results   Component Value Date/Time    TIBC 152 07/09/2022 08:01 AM     FERRITIN:    Lab Results   Component Value Date/Time    FERRITIN 243 07/09/2022 08:01 AM        Imaging:  XR CHEST PORTABLE   Final Result   1. Mild basilar atelectasis. 2.  Diminished inspiratory effort and elevation the right hemidiaphragm   similar to the prior examination. 3.  No significant pleural effusion is visible. 4.  No evidence of pneumothorax. Fluoroscopy modified barium swallow with video   Final Result   Impaired swallowing mechanism with moderate laryngeal penetration with thin   liquid barium. No barium aspiration. Please see separate speech pathology report for full discussion of findings   and recommendations. XR CHEST PORTABLE   Final Result   No sizable pleural effusion. XR CHEST PORTABLE   Final Result   Mild subsegmental atelectasis in the left lower lobe. XR CHEST PORTABLE   Final Result   Grossly stable pulmonary opacities in the lower left lung, which may   represent atelectasis or pneumonia. XR CHEST PORTABLE   Final Result   1. Patchy right perihilar and left lower lobe airspace disease   2. Status post removal of the endotracheal tube and NG tube      3. There is no pneumothorax. XR CHEST PORTABLE   Final Result   Lines and tubes are stable with no pneumothorax on the right. Some   improvement seen in the region of left lung base in the interval.         XR CHEST PORTABLE   Final Result   The evaluation is limited due to low lung volumes.       No interval change compared to prior exam.      Lines and tubes are in unchanged position. XR ABDOMEN FOR NG/OG/NE TUBE PLACEMENT   Final Result   Catheter is in the stomach. XR CHEST PORTABLE   Final Result   Catheter placed with significant right lung aeration improvement and no   pneumothorax         MRI BRAIN WO CONTRAST   Final Result   1. No acute intracranial abnormality. 2. No gross epileptogenic lesion is identified. 3. Bilateral mastoid effusions, which may be due to eustachian tube   dysfunction or otomastoiditis. RECOMMENDATIONS:   Unavailable         CT GUIDED CHEST TUBE   Final Result   Successful uncomplicated  right chest tube placement      Recommendations:   1. Follow-up tube check in 2 weeks   2. Flush tube daily with 5 to 10 mL of sterile saline            XR CHEST PORTABLE   Final Result   1. There is no interval change in the bilateral perihilar and lower lobe   airspace disease more prominent within the right lung. 2. Moderate size right pleural effusion. 3. There is no pneumothorax. US RETROPERITONEAL COMPLETE   Final Result   1. Marked bilateral renal cortical thinning. Echogenic renal parenchyma. No hydronephrosis. 2.  A Diaz catheter appears to be decompressing the bladder. XR CHEST PORTABLE   Final Result   Increasing infiltrate throughout the right lung persistent left basilar   alveolar infiltrate is well. XR CHEST PORTABLE   Final Result   Adequately positioned right internal jugular central venous line. Otherwise,   no significant change compared to prior exam glued in lines and tubes. US DUP LOWER EXTREMITIES BILATERAL VENOUS   Final Result   Within the visualized vessels there is no evidence for deep venous   thrombosis               XR CHEST PORTABLE   Final Result   1. No significant change. Cardiomegaly with right pleural effusion. 2.  Support tubes remain in appropriate position.          CT HEAD WO CONTRAST   Final Result   No acute intracranial abnormality or significant change in the overall   appearance of the brain. MRI would be useful if symptoms persist.      RECOMMENDATIONS:   Unavailable         XR ABDOMEN FOR NG/OG/NE TUBE PLACEMENT   Final Result   Catheter is in the proximal stomach. XR CHEST PORTABLE   Final Result   Adequately positioned endotracheal tube. Nasogastric tube coursing below   diaphragm. Otherwise, stable exam.         CT ABDOMEN PELVIS WO CONTRAST Additional Contrast? None   Final Result   Increasing fluid within the abdomen when compared to the prior study. The   anasarca is also increased in appearance of the prior examination. Mild stranding seen around the mesentery which correlation to clinical lab   values is recommended to exclude possible pancreatitis or volume overload. Overall the appearance of the pancreas itself is grossly unremarkable. There   is only mild stranding seen throughout the mesenteric root. CT CHEST WO CONTRAST   Final Result   Bilateral lung infiltrates with greatest consolidation right lower lobe   probably secondary to atelectasis and or pneumonia. Moderate right pleural effusion. Cardiomegaly and small pericardial effusion. The pericardial effusion is new. Small volume right upper abdominal ascites along with anasarca. This is new. XR CHEST PORTABLE   Final Result   New opacity on the right which may relate to pleural effusion with adjacent   atelectasis and or infiltrate. Cardiomegaly and pulmonary vascular   congestion implying elevated left heart filling pressures. Assessment    1. Acute kidney injury non oligouric: improved   Baseline cr 0.9  Etiology of SHANTANU due to decrease effective circulating volume in setting of intravascular volume depletion as evident by her need for levophed and her urine lytes with high avidity for cl and Na .   Basically bland urine sediment which make GN/vasculitis unlikely   High BUN in part due to steroids   LE edema noted though she is still on levophed    2. Encephalopathy metabolic multifactorial : Improved    3. Digoxin toxicity s/p digbind. Resolved    4. Septic shock 2/2 PNA : resolved    5. Right Pleural Effusion with catheter in place for drainage    6.   Edema, marked, multifactorial        Recommendations    Cr stable   Euvolemic     Continue to support hemoydnamics  Bumex 1 mg po MWF   Pt/ot  Am labs      Electronically signed by Gale Reina MD on 7/27/2022

## 2022-08-15 LAB
FUNGUS (MYCOLOGY) CULTURE: NORMAL
FUNGUS STAIN: NORMAL

## 2022-08-24 ENCOUNTER — APPOINTMENT (OUTPATIENT)
Dept: GENERAL RADIOLOGY | Age: 72
DRG: 189 | End: 2022-08-24
Payer: MEDICARE

## 2022-08-24 ENCOUNTER — HOSPITAL ENCOUNTER (INPATIENT)
Age: 72
LOS: 15 days | Discharge: HOME | DRG: 189 | End: 2022-09-08
Attending: EMERGENCY MEDICINE | Admitting: INTERNAL MEDICINE
Payer: MEDICARE

## 2022-08-24 DIAGNOSIS — I48.91 ATRIAL FIBRILLATION WITH RVR (HCC): ICD-10-CM

## 2022-08-24 DIAGNOSIS — J96.01 ACUTE RESPIRATORY FAILURE WITH HYPOXIA (HCC): Primary | ICD-10-CM

## 2022-08-24 PROBLEM — R41.0 ACUTE CONFUSION: Status: ACTIVE | Noted: 2022-01-01

## 2022-08-24 PROBLEM — J96.02 ACUTE RESPIRATORY FAILURE WITH HYPOXIA AND HYPERCAPNIA (HCC): Status: ACTIVE | Noted: 2022-08-24

## 2022-08-24 PROBLEM — I50.32 CHRONIC DIASTOLIC HEART FAILURE (HCC): Status: ACTIVE | Noted: 2022-01-01

## 2022-08-24 LAB
ALBUMIN SERPL-MCNC: 4 G/DL (ref 3.5–5.2)
ALBUMIN SERPL-MCNC: 4 G/DL (ref 3.5–5.2)
ALP BLD-CCNC: 102 U/L (ref 35–104)
ALP BLD-CCNC: 105 U/L (ref 35–104)
ALT SERPL-CCNC: <5 U/L (ref 0–32)
ALT SERPL-CCNC: <5 U/L (ref 0–32)
ANION GAP SERPL CALCULATED.3IONS-SCNC: 6 MMOL/L (ref 7–16)
ANION GAP SERPL CALCULATED.3IONS-SCNC: 6 MMOL/L (ref 7–16)
AST SERPL-CCNC: 11 U/L (ref 0–31)
AST SERPL-CCNC: 8 U/L (ref 0–31)
BASOPHILIC STIPPLING: ABNORMAL
BASOPHILS ABSOLUTE: 0.06 E9/L (ref 0–0.2)
BASOPHILS ABSOLUTE: 0.07 E9/L (ref 0–0.2)
BASOPHILS RELATIVE PERCENT: 0.8 % (ref 0–2)
BASOPHILS RELATIVE PERCENT: 0.9 % (ref 0–2)
BILIRUB SERPL-MCNC: 0.5 MG/DL (ref 0–1.2)
BILIRUB SERPL-MCNC: 0.5 MG/DL (ref 0–1.2)
BUN BLDV-MCNC: 31 MG/DL (ref 6–23)
BUN BLDV-MCNC: 32 MG/DL (ref 6–23)
CALCIUM SERPL-MCNC: 9.3 MG/DL (ref 8.6–10.2)
CALCIUM SERPL-MCNC: 9.3 MG/DL (ref 8.6–10.2)
CHLORIDE BLD-SCNC: 96 MMOL/L (ref 98–107)
CHLORIDE BLD-SCNC: 96 MMOL/L (ref 98–107)
CO2: 38 MMOL/L (ref 22–29)
CO2: 38 MMOL/L (ref 22–29)
CREAT SERPL-MCNC: 1.2 MG/DL (ref 0.5–1)
CREAT SERPL-MCNC: 1.3 MG/DL (ref 0.5–1)
EOSINOPHILS ABSOLUTE: 0.07 E9/L (ref 0.05–0.5)
EOSINOPHILS ABSOLUTE: 0.09 E9/L (ref 0.05–0.5)
EOSINOPHILS RELATIVE PERCENT: 0.9 % (ref 0–6)
EOSINOPHILS RELATIVE PERCENT: 1.1 % (ref 0–6)
GFR AFRICAN AMERICAN: 49
GFR AFRICAN AMERICAN: 53
GFR NON-AFRICAN AMERICAN: 40 ML/MIN/1.73
GFR NON-AFRICAN AMERICAN: 44 ML/MIN/1.73
GLUCOSE BLD-MCNC: 180 MG/DL (ref 74–99)
GLUCOSE BLD-MCNC: 181 MG/DL (ref 74–99)
HBA1C MFR BLD: 6.1 % (ref 4–5.6)
HCT VFR BLD CALC: 33.3 % (ref 34–48)
HCT VFR BLD CALC: 33.6 % (ref 34–48)
HEMOGLOBIN: 9.2 G/DL (ref 11.5–15.5)
HEMOGLOBIN: 9.5 G/DL (ref 11.5–15.5)
HYPOCHROMIA: ABNORMAL
HYPOCHROMIA: ABNORMAL
IMMATURE GRANULOCYTES #: 0.11 E9/L
IMMATURE GRANULOCYTES %: 1.4 % (ref 0–5)
LACTIC ACID, SEPSIS: 1.2 MMOL/L (ref 0.5–1.9)
LACTIC ACID, SEPSIS: 2.1 MMOL/L (ref 0.5–1.9)
LYMPHOCYTES ABSOLUTE: 0.49 E9/L (ref 1.5–4)
LYMPHOCYTES ABSOLUTE: 0.79 E9/L (ref 1.5–4)
LYMPHOCYTES RELATIVE PERCENT: 6.1 % (ref 20–42)
LYMPHOCYTES RELATIVE PERCENT: 9.9 % (ref 20–42)
MCH RBC QN AUTO: 27.9 PG (ref 26–35)
MCH RBC QN AUTO: 28.4 PG (ref 26–35)
MCHC RBC AUTO-ENTMCNC: 27.6 % (ref 32–34.5)
MCHC RBC AUTO-ENTMCNC: 28.3 % (ref 32–34.5)
MCV RBC AUTO: 100.6 FL (ref 80–99.9)
MCV RBC AUTO: 100.9 FL (ref 80–99.9)
METER GLUCOSE: 137 MG/DL (ref 74–99)
METER GLUCOSE: 170 MG/DL (ref 74–99)
MONOCYTES ABSOLUTE: 0.32 E9/L (ref 0.1–0.95)
MONOCYTES ABSOLUTE: 0.53 E9/L (ref 0.1–0.95)
MONOCYTES RELATIVE PERCENT: 4.3 % (ref 2–12)
MONOCYTES RELATIVE PERCENT: 6.7 % (ref 2–12)
NEUTROPHILS ABSOLUTE: 6.38 E9/L (ref 1.8–7.3)
NEUTROPHILS ABSOLUTE: 7.13 E9/L (ref 1.8–7.3)
NEUTROPHILS RELATIVE PERCENT: 80.1 % (ref 43–80)
NEUTROPHILS RELATIVE PERCENT: 87.8 % (ref 43–80)
OVALOCYTES: ABNORMAL
OVALOCYTES: ABNORMAL
PDW BLD-RTO: 15.8 FL (ref 11.5–15)
PDW BLD-RTO: 15.9 FL (ref 11.5–15)
PLATELET # BLD: 192 E9/L (ref 130–450)
PLATELET # BLD: 198 E9/L (ref 130–450)
PMV BLD AUTO: 11.1 FL (ref 7–12)
PMV BLD AUTO: 11.3 FL (ref 7–12)
POIKILOCYTES: ABNORMAL
POIKILOCYTES: ABNORMAL
POLYCHROMASIA: ABNORMAL
POLYCHROMASIA: ABNORMAL
POTASSIUM SERPL-SCNC: 5.1 MMOL/L (ref 3.5–5)
POTASSIUM SERPL-SCNC: 5.5 MMOL/L (ref 3.5–5)
PRO-BNP: 9307 PG/ML (ref 0–125)
RBC # BLD: 3.3 E12/L (ref 3.5–5.5)
RBC # BLD: 3.34 E12/L (ref 3.5–5.5)
SODIUM BLD-SCNC: 140 MMOL/L (ref 132–146)
SODIUM BLD-SCNC: 140 MMOL/L (ref 132–146)
TEAR DROP CELLS: ABNORMAL
TOTAL PROTEIN: 6.4 G/DL (ref 6.4–8.3)
TOTAL PROTEIN: 6.7 G/DL (ref 6.4–8.3)
TROPONIN, HIGH SENSITIVITY: 31 NG/L (ref 0–9)
WBC # BLD: 8 E9/L (ref 4.5–11.5)
WBC # BLD: 8.1 E9/L (ref 4.5–11.5)

## 2022-08-24 PROCEDURE — 96375 TX/PRO/DX INJ NEW DRUG ADDON: CPT

## 2022-08-24 PROCEDURE — 2060000000 HC ICU INTERMEDIATE R&B

## 2022-08-24 PROCEDURE — 2500000003 HC RX 250 WO HCPCS: Performed by: EMERGENCY MEDICINE

## 2022-08-24 PROCEDURE — 94640 AIRWAY INHALATION TREATMENT: CPT

## 2022-08-24 PROCEDURE — 6370000000 HC RX 637 (ALT 250 FOR IP): Performed by: FAMILY MEDICINE

## 2022-08-24 PROCEDURE — 2580000003 HC RX 258: Performed by: FAMILY MEDICINE

## 2022-08-24 PROCEDURE — 80053 COMPREHEN METABOLIC PANEL: CPT

## 2022-08-24 PROCEDURE — 84484 ASSAY OF TROPONIN QUANT: CPT

## 2022-08-24 PROCEDURE — 82962 GLUCOSE BLOOD TEST: CPT

## 2022-08-24 PROCEDURE — 83605 ASSAY OF LACTIC ACID: CPT

## 2022-08-24 PROCEDURE — 96365 THER/PROPH/DIAG IV INF INIT: CPT

## 2022-08-24 PROCEDURE — 6360000002 HC RX W HCPCS: Performed by: FAMILY MEDICINE

## 2022-08-24 PROCEDURE — 36415 COLL VENOUS BLD VENIPUNCTURE: CPT

## 2022-08-24 PROCEDURE — 71045 X-RAY EXAM CHEST 1 VIEW: CPT

## 2022-08-24 PROCEDURE — 2500000003 HC RX 250 WO HCPCS: Performed by: FAMILY MEDICINE

## 2022-08-24 PROCEDURE — 85025 COMPLETE CBC W/AUTO DIFF WBC: CPT

## 2022-08-24 PROCEDURE — 87040 BLOOD CULTURE FOR BACTERIA: CPT

## 2022-08-24 PROCEDURE — 2580000003 HC RX 258: Performed by: EMERGENCY MEDICINE

## 2022-08-24 PROCEDURE — 83036 HEMOGLOBIN GLYCOSYLATED A1C: CPT

## 2022-08-24 PROCEDURE — 99285 EMERGENCY DEPT VISIT HI MDM: CPT

## 2022-08-24 PROCEDURE — 99223 1ST HOSP IP/OBS HIGH 75: CPT | Performed by: FAMILY MEDICINE

## 2022-08-24 RX ORDER — SODIUM CHLORIDE 9 MG/ML
INJECTION, SOLUTION INTRAVENOUS PRN
Status: DISCONTINUED | OUTPATIENT
Start: 2022-08-24 | End: 2022-08-28

## 2022-08-24 RX ORDER — BUDESONIDE AND FORMOTEROL FUMARATE DIHYDRATE 160; 4.5 UG/1; UG/1
2 AEROSOL RESPIRATORY (INHALATION) 2 TIMES DAILY
COMMUNITY

## 2022-08-24 RX ORDER — CHOLECALCIFEROL (VITAMIN D3) 125 MCG
5 CAPSULE ORAL NIGHTLY
Status: DISCONTINUED | OUTPATIENT
Start: 2022-08-24 | End: 2022-08-24 | Stop reason: CLARIF

## 2022-08-24 RX ORDER — IPRATROPIUM BROMIDE AND ALBUTEROL SULFATE 2.5; .5 MG/3ML; MG/3ML
1 SOLUTION RESPIRATORY (INHALATION) 4 TIMES DAILY
COMMUNITY

## 2022-08-24 RX ORDER — SUCRALFATE 1 G/1
1 TABLET ORAL 4 TIMES DAILY
Status: DISCONTINUED | OUTPATIENT
Start: 2022-08-24 | End: 2022-09-08 | Stop reason: HOSPADM

## 2022-08-24 RX ORDER — ASPIRIN 81 MG/1
81 TABLET ORAL DAILY
Status: DISCONTINUED | OUTPATIENT
Start: 2022-08-25 | End: 2022-09-08 | Stop reason: HOSPADM

## 2022-08-24 RX ORDER — PROMETHAZINE HYDROCHLORIDE 25 MG/1
25 TABLET ORAL EVERY 6 HOURS PRN
COMMUNITY

## 2022-08-24 RX ORDER — INSULIN ASPART 100 [IU]/ML
0-10 INJECTION, SOLUTION INTRAVENOUS; SUBCUTANEOUS
COMMUNITY

## 2022-08-24 RX ORDER — 0.9 % SODIUM CHLORIDE 0.9 %
1000 INTRAVENOUS SOLUTION INTRAVENOUS ONCE
Status: COMPLETED | OUTPATIENT
Start: 2022-08-24 | End: 2022-08-24

## 2022-08-24 RX ORDER — CARBIDOPA AND LEVODOPA 25; 100 MG/1; MG/1
2 TABLET, EXTENDED RELEASE ORAL 3 TIMES DAILY
Status: DISCONTINUED | OUTPATIENT
Start: 2022-08-24 | End: 2022-09-08 | Stop reason: HOSPADM

## 2022-08-24 RX ORDER — ACETAMINOPHEN 650 MG/1
650 SUPPOSITORY RECTAL EVERY 6 HOURS PRN
Status: DISCONTINUED | OUTPATIENT
Start: 2022-08-24 | End: 2022-09-08 | Stop reason: HOSPADM

## 2022-08-24 RX ORDER — ATORVASTATIN CALCIUM 20 MG/1
20 TABLET, FILM COATED ORAL NIGHTLY
Status: DISCONTINUED | OUTPATIENT
Start: 2022-08-24 | End: 2022-09-08 | Stop reason: HOSPADM

## 2022-08-24 RX ORDER — INSULIN GLARGINE 100 [IU]/ML
5 INJECTION, SOLUTION SUBCUTANEOUS NIGHTLY
Status: DISCONTINUED | OUTPATIENT
Start: 2022-08-24 | End: 2022-08-25

## 2022-08-24 RX ORDER — PANTOPRAZOLE SODIUM 40 MG/1
40 TABLET, DELAYED RELEASE ORAL EVERY MORNING
COMMUNITY

## 2022-08-24 RX ORDER — MIRTAZAPINE 15 MG/1
15 TABLET, FILM COATED ORAL NIGHTLY
COMMUNITY

## 2022-08-24 RX ORDER — INSULIN LISPRO 100 [IU]/ML
0-8 INJECTION, SOLUTION INTRAVENOUS; SUBCUTANEOUS
Status: DISCONTINUED | OUTPATIENT
Start: 2022-08-24 | End: 2022-08-25

## 2022-08-24 RX ORDER — LORAZEPAM 0.5 MG/1
0.5 TABLET ORAL EVERY 12 HOURS PRN
Status: DISCONTINUED | OUTPATIENT
Start: 2022-08-24 | End: 2022-09-08 | Stop reason: HOSPADM

## 2022-08-24 RX ORDER — ACETAMINOPHEN 325 MG/1
650 TABLET ORAL EVERY 4 HOURS PRN
COMMUNITY

## 2022-08-24 RX ORDER — IPRATROPIUM BROMIDE AND ALBUTEROL SULFATE 2.5; .5 MG/3ML; MG/3ML
1 SOLUTION RESPIRATORY (INHALATION) 4 TIMES DAILY
Status: DISCONTINUED | OUTPATIENT
Start: 2022-08-24 | End: 2022-09-08 | Stop reason: HOSPADM

## 2022-08-24 RX ORDER — ROPINIROLE 1 MG/1
1 TABLET, FILM COATED ORAL 3 TIMES DAILY
Status: DISCONTINUED | OUTPATIENT
Start: 2022-08-24 | End: 2022-09-08 | Stop reason: HOSPADM

## 2022-08-24 RX ORDER — INSULIN LISPRO 100 [IU]/ML
0-4 INJECTION, SOLUTION INTRAVENOUS; SUBCUTANEOUS NIGHTLY
Status: DISCONTINUED | OUTPATIENT
Start: 2022-08-24 | End: 2022-08-25

## 2022-08-24 RX ORDER — AMIODARONE HYDROCHLORIDE 200 MG/1
200 TABLET ORAL DAILY
Status: DISCONTINUED | OUTPATIENT
Start: 2022-08-25 | End: 2022-08-29

## 2022-08-24 RX ORDER — SODIUM CHLORIDE 0.9 % (FLUSH) 0.9 %
5-40 SYRINGE (ML) INJECTION EVERY 12 HOURS SCHEDULED
Status: DISCONTINUED | OUTPATIENT
Start: 2022-08-24 | End: 2022-09-08 | Stop reason: HOSPADM

## 2022-08-24 RX ORDER — DEXTROSE MONOHYDRATE 100 MG/ML
INJECTION, SOLUTION INTRAVENOUS CONTINUOUS PRN
Status: DISCONTINUED | OUTPATIENT
Start: 2022-08-24 | End: 2022-09-08 | Stop reason: HOSPADM

## 2022-08-24 RX ORDER — CHOLECALCIFEROL (VITAMIN D3) 125 MCG
5 CAPSULE ORAL NIGHTLY
COMMUNITY
End: 2022-10-05 | Stop reason: ALTCHOICE

## 2022-08-24 RX ORDER — SODIUM CHLORIDE 0.9 % (FLUSH) 0.9 %
5-40 SYRINGE (ML) INJECTION PRN
Status: DISCONTINUED | OUTPATIENT
Start: 2022-08-24 | End: 2022-08-28

## 2022-08-24 RX ORDER — PANTOPRAZOLE SODIUM 40 MG/1
40 TABLET, DELAYED RELEASE ORAL EVERY MORNING
Status: DISCONTINUED | OUTPATIENT
Start: 2022-08-25 | End: 2022-09-08 | Stop reason: HOSPADM

## 2022-08-24 RX ORDER — ACETAMINOPHEN 325 MG/1
650 TABLET ORAL EVERY 6 HOURS PRN
Status: DISCONTINUED | OUTPATIENT
Start: 2022-08-24 | End: 2022-09-08 | Stop reason: HOSPADM

## 2022-08-24 RX ORDER — BENZOIN
1 TINCTURE TOPICAL NIGHTLY
COMMUNITY

## 2022-08-24 RX ORDER — LORAZEPAM 1 MG/1
1 TABLET ORAL EVERY 6 HOURS PRN
Status: ON HOLD | COMMUNITY
End: 2022-10-10 | Stop reason: HOSPADM

## 2022-08-24 RX ORDER — BUMETANIDE 1 MG/1
1 TABLET ORAL DAILY
Status: ON HOLD | COMMUNITY
Start: 2022-08-24 | End: 2022-09-08 | Stop reason: HOSPADM

## 2022-08-24 RX ORDER — BENZOIN
1 TINCTURE TOPICAL NIGHTLY
Status: DISCONTINUED | OUTPATIENT
Start: 2022-08-24 | End: 2022-08-25 | Stop reason: CLARIF

## 2022-08-24 RX ORDER — MIRTAZAPINE 15 MG/1
15 TABLET, FILM COATED ORAL
Status: DISCONTINUED | OUTPATIENT
Start: 2022-08-24 | End: 2022-09-08 | Stop reason: HOSPADM

## 2022-08-24 RX ORDER — DILTIAZEM HYDROCHLORIDE 5 MG/ML
10 INJECTION INTRAVENOUS ONCE
Status: COMPLETED | OUTPATIENT
Start: 2022-08-24 | End: 2022-08-24

## 2022-08-24 RX ORDER — POLYETHYLENE GLYCOL 3350 17 G/17G
17 POWDER, FOR SOLUTION ORAL DAILY PRN
Status: DISCONTINUED | OUTPATIENT
Start: 2022-08-24 | End: 2022-09-08 | Stop reason: HOSPADM

## 2022-08-24 RX ORDER — BUDESONIDE 0.5 MG/2ML
0.5 INHALANT ORAL 2 TIMES DAILY
Status: DISCONTINUED | OUTPATIENT
Start: 2022-08-24 | End: 2022-09-08 | Stop reason: HOSPADM

## 2022-08-24 RX ORDER — MONTELUKAST SODIUM 10 MG/1
10 TABLET ORAL NIGHTLY
Status: DISCONTINUED | OUTPATIENT
Start: 2022-08-24 | End: 2022-09-08 | Stop reason: HOSPADM

## 2022-08-24 RX ORDER — ATORVASTATIN CALCIUM 20 MG/1
20 TABLET, FILM COATED ORAL NIGHTLY
COMMUNITY

## 2022-08-24 RX ORDER — MECOBALAMIN 5000 MCG
5 TABLET,DISINTEGRATING ORAL NIGHTLY
Status: DISCONTINUED | OUTPATIENT
Start: 2022-08-24 | End: 2022-09-08 | Stop reason: HOSPADM

## 2022-08-24 RX ADMIN — SUCRALFATE 1 G: 1 TABLET ORAL at 20:15

## 2022-08-24 RX ADMIN — ROPINIROLE HYDROCHLORIDE 1 MG: 1 TABLET, FILM COATED ORAL at 20:14

## 2022-08-24 RX ADMIN — APIXABAN 5 MG: 5 TABLET, FILM COATED ORAL at 20:14

## 2022-08-24 RX ADMIN — INSULIN GLARGINE 5 UNITS: 100 INJECTION, SOLUTION SUBCUTANEOUS at 20:22

## 2022-08-24 RX ADMIN — METOPROLOL TARTRATE 12.5 MG: 25 TABLET, FILM COATED ORAL at 20:15

## 2022-08-24 RX ADMIN — IPRATROPIUM BROMIDE AND ALBUTEROL SULFATE 3 ML: .5; 2.5 SOLUTION RESPIRATORY (INHALATION) at 19:16

## 2022-08-24 RX ADMIN — Medication 5 MG: at 20:14

## 2022-08-24 RX ADMIN — DILTIAZEM HYDROCHLORIDE 2.5 MG/HR: 5 INJECTION, SOLUTION INTRAVENOUS at 15:23

## 2022-08-24 RX ADMIN — MONTELUKAST 10 MG: 10 TABLET, FILM COATED ORAL at 20:15

## 2022-08-24 RX ADMIN — CARBIDOPA AND LEVODOPA 2 TABLET: 25; 100 TABLET, EXTENDED RELEASE ORAL at 22:15

## 2022-08-24 RX ADMIN — SODIUM CHLORIDE 1000 ML: 9 INJECTION, SOLUTION INTRAVENOUS at 15:20

## 2022-08-24 RX ADMIN — Medication 10 ML: at 21:56

## 2022-08-24 RX ADMIN — ANTI-FUNGAL POWDER MICONAZOLE NITRATE TALC FREE 1 EACH: 1.42 POWDER TOPICAL at 21:56

## 2022-08-24 RX ADMIN — DILTIAZEM HYDROCHLORIDE 10 MG: 5 INJECTION, SOLUTION INTRAVENOUS at 15:22

## 2022-08-24 RX ADMIN — BUDESONIDE 500 MCG: 0.5 SUSPENSION RESPIRATORY (INHALATION) at 19:16

## 2022-08-24 RX ADMIN — ATORVASTATIN CALCIUM 20 MG: 20 TABLET, FILM COATED ORAL at 20:14

## 2022-08-24 ASSESSMENT — PAIN - FUNCTIONAL ASSESSMENT
PAIN_FUNCTIONAL_ASSESSMENT: NONE - DENIES PAIN

## 2022-08-24 ASSESSMENT — LIFESTYLE VARIABLES
HOW OFTEN DO YOU HAVE A DRINK CONTAINING ALCOHOL: NEVER

## 2022-08-24 ASSESSMENT — ENCOUNTER SYMPTOMS
CHEST TIGHTNESS: 0
SHORTNESS OF BREATH: 0

## 2022-08-24 NOTE — ED NOTES
Inga daughter shares poa with brother Tony Choi.   Inga phone numb er  [phone]     Aman Ken, [place]  08/24/22 [id number]

## 2022-08-24 NOTE — CARE COORDINATION
SS Note: No Covid test. Pt presented for weakness & low 02 sats. Pt from [facility] & does not know why she is there & is not sure why she is in the emergency department today. Pt does have A-fib w/RVR, CHF and moderate size R pleural effusion & trace left Pleural effusion. SW met w/pt in ED 01 for transition of care planning. Spoke from door wearing mask/PPE and explained role. However, pt is not responding much. She was able to identify her son Solis Almanza who was @ [facility] but then closed her eyes. Pt is  & her son notes she was [place] [facility] [place]. Pt has past hx of SHERITA w/K[facility] Ye. Pt currently on 5L 02. SW completed ACP w/pt;s son Solis Almanza and he notes his brother Roro Baum is her decision maker. KRISHNA contacted [facility] who notes pt is a bed hold- Medicaid and no precert needed to return.    Electronically signed by ESTHER Alexander on 8/24/2022 at 5:54 PM

## 2022-08-24 NOTE — ACP (ADVANCE CARE PLANNING)
Advance Care Planning   Healthcare Decision Maker:    Primary Decision Maker: Zoey Martinez - [place] - [phone]    Click here to complete Healthcare Decision Makers including selection of the Healthcare Decision Maker Relationship (ie \"Primary\"). Today we documented Decision Maker(s) consistent with Legal Next of Kin hierarchy.      Electronically signed by ESTHER Alexander on 8/24/2022 at 5:56 PM

## 2022-08-24 NOTE — PROGRESS NOTES
Wesley Dave arrived to the floor, cardizem drip running at 5mg/hr  heart rate still in the low 110-120s. Will titrate.

## 2022-08-24 NOTE — H&P
[facility] History and Physical      CHIEF COMPLAINT:    Low oxygen level, confusion at SNF    History of Present Illness:    66 yr old female, resident of [facility], with a hx of afib on Eliquis, recent placement of chest tube catheter for right pleural effusion, asthma, diastolic CHF, discharged from [facility] on presented here after being found to be confused at her ECF -- brought in to our ER by paramedics. Pt was found by EMS to be hypoxic, 5 liters of O2 was placed, and her mental status normalized. When she arrived to the ER, she was talking but was unable to give any reason why she was here. Pt's son was in the ER upon her arrival.  She admitted to feeling weak. Upon my arrival, pt is obtunded. Has been on 5 liters O2 in ER for past few hours. Cardizem drip started and heart rate has gone down to the 90's. Also received a 1 liter bolus. She is unable to give any ROS.     Informant(s) for H&P:  chart    REVIEW OF SYSTEMS:  A comprehensive review of systems was negative except for: what is in the HPI  No headache, no ear or eye symptoms, no pharyngitis, no rhinorrhea, no neck pain, no chest pain, pos shortness of breath, no palpitations, no presyncope, no syncope, no abdominal pain, no nausea or vomiting, no diarrhea, no urinary symptoms, no vertigo, no rashes, no pruritus, no anorexia, no fevers, no chills, no sweats, no fatigue, no focal numbness, no tingling, no weakness, no hematemesis, no hematochezia, pos weakness, pos confusion    PMH:  Past Medical History:   Diagnosis Date    A-fib ([address]     Acute on chronic congestive heart failure (HCC)     Anxiety     Asthma     CAD (coronary artery disease) 01/21/2016    Cancer ([address]  breast ca 2006    right lumpectomy    Chronic kidney disease     nephrolithiasis    Depression     Diabetes mellitus ([address]     H/O mammogram     Hx MRSA infection     toe infection january 2012    Hyperlipidemia     Hypertension     Lateral epicondylitis     SERENA on CPAP     Parkinson's disease ([facility]     Tubal ligation status        Surgical History:  Past Surgical History:   Procedure Laterality Date    BREAST LUMPECTOMY      BREAST REDUCTION SURGERY      CARDIAC CATHETERIZATION  4/28/2014    Dr. Naomie Andrews  01/11/2022    Dr. Tony Willams  7/29/15    CT GUIDED CHEST TUBE  7/8/2022    CT GUIDED CHEST TUBE 7/8/2022 MD HALLEY Cope CT    ENDOSCOPY, COLON, DIAGNOSTIC  7/19/15    GALLBLADDER SURGERY      LUMBAR LAMINECTOMY      TOE AMPUTATION      TONSILLECTOMY      UPPER GASTROINTESTINAL ENDOSCOPY      UPPER GASTROINTESTINAL ENDOSCOPY N/A 7/19/2022    EGD ESOPHAGOGASTRODUODENOSCOPY performed by Radha Vasquez MD at [facility]       Medications Prior to Admission:    Prior to Admission medications    Medication Sig Start Date End Date Taking? Authorizing Provider   atorvastatin (LIPITOR) 20 MG tablet Take 20 mg by mouth nightly   Yes Historical Provider, MD   benzoin compound solution Apply 1 Applicatorful topically nightly Apply topically to right toe   Yes Historical Provider, MD   bumetanide (BUMEX) 1 MG tablet Take 1 mg by mouth daily 8/24/22 8/30/22 Yes Historical Provider, MD   ipratropium-albuterol (DUONEB) 0.5-2.5 (3) MG/3ML SOLN nebulizer solution Inhale 1 vial into the lungs 4 times daily   Yes Historical Provider, MD   LORazepam (ATIVAN) 0.5 MG tablet Take 0.5 mg by mouth every 12 hours as needed for Anxiety.    Yes Historical Provider, MD   melatonin 5 MG TABS tablet Take 5 mg by mouth nightly   Yes Historical Provider, MD   miconazole (MICOTIN) 2 % powder Apply 1 each topically 2 times daily Apply topically under breasts twice daily   Yes Historical Provider, MD   insulin aspart (NOVOLOG) 100 UNIT/ML injection vial Inject 0-10 Units into the skin 3 times daily (before meals) Blood Glucose 140 - 199 = 1 Unit  Blood Glucose 200 - 249 = 2 Units  Blood Glucose 250 - 299 = 4 Units  Blood Glucose 300 - 349 = 6 Units  Blood Glucose 350 - 399 = 8 Units  Blood Glucose 400+  = 10 Units   Yes Historical Provider, MD   pantoprazole (PROTONIX) 40 MG tablet Take 40 mg by mouth every morning   Yes Historical Provider, MD   promethazine (PHENERGAN) 25 MG tablet Take 25 mg by mouth every 6 hours as needed for Nausea   Yes Historical Provider, MD   mirtazapine (REMERON) 15 MG tablet Take 15 mg by mouth nightly as needed (Anxiety/Insomnia)   Yes Historical Provider, MD   budesonide-formoterol (SYMBICORT) 160-4.5 MCG/ACT AERO Inhale 2 puffs into the lungs 2 times daily   Yes Historical Provider, MD   acetaminophen (TYLENOL) 325 MG tablet Take 650 mg by mouth every 4 hours as needed for Pain   Yes Historical Provider, MD   OXYGEN Inhale 2 L into the lungs continuous   Yes Historical Provider, MD   metoprolol tartrate (LOPRESSOR) 25 MG tablet Take 0.5 tablets by mouth in the morning and 0.5 tablets before bedtime. 7/27/22   Heather Blanco MD   amiodarone (CORDARONE) 200 MG tablet Take 1 tablet by mouth in the morning. 7/24/22   Heather Blanco MD   budesonide (PULMICORT) 0.5 MG/2ML nebulizer suspension Take 2 mLs by nebulization in the morning and 2 mLs in the evening. 7/23/22   Heather Blanco MD   apixaban (ELIQUIS) 5 MG TABS tablet Take 1 tablet by mouth in the morning and 1 tablet before bedtime. 7/23/22   Heather Blanco MD   insulin glargine-yfgn [facility]) 100 UNIT/ML injection vial Inject 5 Units into the skin nightly 7/23/22   Heather Blanco MD   carbidopa-levodopa (SINEMET CR)  MG per extended release tablet Take 2 tablets by mouth in the morning and 2 tablets at noon and 2 tablets in the evening. 7/23/22   Heather Blanco MD   rOPINIRole (REQUIP) 1 MG tablet Take 1 tablet by mouth in the morning and 1 tablet at noon and 1 tablet before bedtime.  7/23/22   Heather Blanco MD   QUEtiapine (SEROQUEL) 25 MG tablet Take 1 tablet by mouth in the morning and 1 tablet before bedtime. 7/23/22   Hawa Bhandari MD   sucralfate (CARAFATE) 1 GM tablet Take 1 tablet by mouth in the morning and 1 tablet at noon and 1 tablet in the evening and 1 tablet before bedtime. 7/20/22   Leyla Gaines DO   divalproex (DEPAKOTE) 250 MG DR tablet Take 250 mg by mouth 2 times daily  7/23/22  Historical Provider, MD   ferrous sulfate (IRON 325) 325 (65 Fe) MG tablet Take 325 mg by mouth daily (with breakfast)  Patient not taking: Reported on 7/7/2022 7/23/22  Historical Provider, MD   aspirin EC 81 MG EC tablet Take 1 tablet by mouth daily 5/17/22   Marcella Swanson MD   montelukast (SINGULAIR) 10 MG tablet Take 10 mg by mouth nightly    Historical Provider, MD       Allergies:    Ciprofloxacin and Codeine    Social History:    reports that she has never smoked. She has never used smokeless tobacco. She reports that she does not drink alcohol and does not use drugs. Family History:   family history includes Cancer in her father and mother; Diabetes in her sister; Heart Disease in her sister; Other in her brother; Seizures in her son.        PHYSICAL EXAM:  Vitals:  /83   Pulse 97   Temp 97.8 °F (36.6 °C) (Oral)   Resp 19   Ht 5' (1.524 m)   Wt 255 lb (115.7 kg)   SpO2 96%   BMI 49.80 kg/m²     General Appearance: obtunded, not in resp distress sitting up in [facility] in ER on 5 liters, does not awaken to light touch or to her name being called, and in no acute distress, well nourished, good hygiene  Skin: warm and dry, good turgor, no rashes, no jaundice, bit of hard quality  Head: normocephalic and atraumatic  Eyes: pupils equal, round, and reactive to light, extraocular eye movements intact, conjunctivae normal  Neck: neck supple and non tender without mass, no thyromegaly  Pulmonary/Chest: no wheezes, no rales, no breath sounds right side, or rhonchi, normal air movement, no respiratory distress  Cardiovascular: normal rate, normal S1 and S2 and no carotid bruits  Abdomen: soft, non-tender, non-distended, normal bowel sounds, no masses or organomegaly, no guarding, no rebound  Extremities: no cyanosis, no clubbing and no edema, good pedal pulses bilaterally, good cap refill of fingers/toes  Neurologic: obtunded, moving all extremities equally, sitting up in gurney    LABS:  Recent Labs     08/24/22  0554 08/24/22  1227    140   K 5.5* 5.1*   CL 96* 96*   CO2 38* 38*   BUN 31* 32*   CREATININE 1.3* 1.2*   GLUCOSE 181* 180*   CALCIUM 9.3 9.3       Recent Labs     08/24/22  0554 08/24/22  1227   WBC 8.0 8.1   RBC 3.30* 3.34*   HGB 9.2* 9.5*   HCT 33.3* 33.6*   .9* 100.6*   MCH 27.9 28.4   MCHC 27.6* 28.3*   RDW 15.8* 15.9*    198   MPV 11.3 11.1       No results for input(s): POCGLU in the last 72 hours.     Hepatic Function Panel:    Lab Results   Component Value Date/Time    ALKPHOS 105 08/24/2022 12:27 PM    ALT <5 08/24/2022 12:27 PM    AST 8 08/24/2022 12:27 PM    PROT 6.7 08/24/2022 12:27 PM    BILITOT 0.5 08/24/2022 12:27 PM    BILIDIR 0.2 07/20/2013 06:20 PM    LABALBU 4.0 08/24/2022 12:27 PM     PT/INR:    Lab Results   Component Value Date/Time    PROTIME 20.9 07/21/2022 04:34 AM    INR 1.9 07/21/2022 04:34 AM     PTT:    Lab Results   Component Value Date/Time    APTT 33.9 07/20/2022 04:54 AM   [APTT}  Troponin:    Lab Results   Component Value Date/Time    TROPONINI <0.01 10/26/2020 04:11 PM     U/A:    Lab Results   Component Value Date/Time    COLORU Yellow 07/05/2022 05:43 PM    PROTEINU 30 07/05/2022 05:43 PM    PHUR 5.5 07/05/2022 05:43 PM    WBCUA 1-3 07/05/2022 05:43 PM    RBCUA NONE 07/05/2022 05:43 PM    RBCUA NONE 03/25/2013 09:00 PM    YEAST RARE 10/27/2015 07:18 PM    BACTERIA FEW 07/05/2022 05:43 PM    CLARITYU Clear 07/05/2022 05:43 PM    SPECGRAV 1.025 07/05/2022 05:43 PM    LEUKOCYTESUR Negative 07/05/2022 05:43 PM    UROBILINOGEN 0.2 07/05/2022 05:43 PM    BILIRUBINUR Negative 07/05/2022 05:43 PM    BLOODU Negative 07/05/2022 05:43 PM    GLUCOSEU Negative 07/05/2022 05:43 PM     ABG:    Lab Results   Component Value Date/Time    PH 7.452 07/11/2022 05:07 AM    PCO2 40.0 07/11/2022 05:07 AM    PO2 133.6 07/11/2022 05:07 AM    HCO3 27.3 07/11/2022 05:07 AM    BE 3.1 07/11/2022 05:07 AM    O2SAT 98.8 07/11/2022 05:07 AM       Radiology:   XR CHEST PORTABLE   Final Result   1. Cardiomegaly with findings of CHF   2. Moderate size right pleural effusion   3. Trace left pleural effusion             EKG:   Rapid atrial fibrillation at 118, rightward axis, low voltage throughout, no ST elevations/depressions, Q waves septal leads    ASSESSMENT:      Principal Problem:    Acute respiratory failure with hypoxia and hypercapnia (HCC)  Active Problems:    Encephalopathy acute    Pleural effusion on right    Acute confusion    Chronic diastolic heart failure (HCC)    Diabetes mellitus (HCC)    Atrial fibrillation with rapid ventricular response (HCC)    Coronary artery disease involving native coronary artery of native heart without angina pectoris  Resolved Problems:    * No resolved hospital problems. *    Pt was discharged from [facility] on 7/29 -- was treated for septic shock due to pneumonia, right pleural effusion, anasarca, acute diastolic heart failure, hypernatremia, and acute hypoxic/hypercapnic respiratory failure, was intubated/in ICU. PLAN:    1. Acute hypoxic and hypercapnic respiratory failure -- etiology of her altered mental status at [facility]. Pro-BNP elevated, but not as high as last admit where it was 37,428.  -ABG STAT -- may need BIPAP  -Consult to pulmonology  -RT  -Duonebs every 4 hours  -Pulmicort neb BID    2. Right pleural effusion -- had a chest tube from 7/8 to 7/21 to drain the fluid, found to be a transudate, thought to be due to CHF. -consult to pulmonology  -Salt restriction  -Consider fluid restriction  -Intakes/outputs/daily weights    3.   SHANTANU -- with hyperkalemia -- likely pre-renal, has been on Bumex started last month -- discharged with a BUN/creat of 14/1.0 on 7/27.    -Hold Bumex for now  -Repeat BMP daily  -Intakes/outputs/daily weights  -pt's tachycardia improved with 1 liter of normal saline given in ER, had borderline hypotension at 99/60, BP has now normalized (despite being on Cardizem drip)    4. Hx of asthma -- no wheezing at this point  -RT  -Consult to pulmonology  -Duonebs  -Pulmicort respules    5. Rapid afib -- rate controlled now on Cardizem  -Consult to cardiology  -Titrate drip for HR less than 100  -Telemetry    Code Status: TOTAL support  DVT prophylaxis: on Eliquis  Disp: likely back to prior ECF -- in about 4 days    NOTE: This report was transcribed using voice recognition software. Every effort was made to ensure accuracy; however, inadvertent computerized transcription errors may be present.     Electronically signed by Wayne Matt DO on 8/24/2022 at 6:36 PM

## 2022-08-24 NOTE — LETTER
[place] Department Medicaid  CERTIFICATION OF NECESSITY  FOR NON-EMERGENCY TRANSPORTATION   BY GROUND AMBULANCE      Individual Information   1. Name: Buffy Rushing 2. [place] Medicaid Billing Number:    3. Address: [address]      Transportation Provider Information   4. Provider Name: [facility]   5. [place] Medicaid Provider Number:  National Provider Identifier (NPI):      Certification  7. Criteria:  During transport, this individual requires:  [x] Medical treatment or continuous     supervision by an EMT. [] The administration or regulation of oxygen by another person. [] Supervised protective restraint. 8. Period Beginning Date:    5. Length  [x] Not more than 1 day(s)  [] One Year     Additional Information Relevant to Certification   10. Comments or Explanations, If Necessary or Appropriate :  Acute resp failure with hypoxia and hypercapnia, Acute CHF, encephalopathy, acute confusion, metabolic alkalosis         Certifying Practitioner Information   11. Name of Practitioner: Dr Katherin Arnett   12. [place] Medicaid Provider Number, If Applicable:  [address] Provider Identifier (NPI): [id number]     Signature Information   14. Date of Signature: 9/7/2022 15. Name of Person Signing: Darrel Alvarez RN   16. Signature and Professional Designation: Electronically signed by Darrel Alvarez RN on 9/7/2022 at 11:09 AM     OD [id number]  Rev. 7/2015  [facility] Encounter Date/Time: 8/24/2022 [facility] Account: [de-identified]    MRN: [id number]    Patient: Buffy Rushing    Contact Serial #: [id number]      ENCOUNTER          Patient Class: I Private Enc? No Unit  BD: [facility] [id number]   Hospital Service: FMP   Encounter DX:  Atrial fibrillation with*   ADM Provider: Booker Wyman MD   Procedure:     ATT Provider: Katherin Arnett MD   REF Provider:        Admission DX: Atrial fibrillation with rapid ventricular response ([facility] and DX codes: I48.91    PATIENT                 Name: Mamadou Delgado : [birth year] (71 yrs)   Address: [address] [facility] [address] Sex: Female   [place] city: [place] [address]         Marital Status:    Employer: RETIRED         Orthodoxy: Jainism   Primary Care Provider: Chaitanya Chase DO         Primary Phone: [phone]   EMERGENCY CONTACT   Contact Name Legal Guardian? Relationship to Patient Home Phone Work Phone   1. Tom Thomas  2. Celia Neff No  No Child  Child [phone]                 GUARANTOR            Guarantor: Mamadou Delgado     : 1950   Address: [address] Sex: Female   [place] [address]     Relation to Patient: Self       Home Phone: [phone]   Guarantor ID: [id number]       Work Phone:     Guarantor Employer: RETIRED         Status: RETIRED      COVERAGE  PRIMARY INSURANCE   Payor: [facility] MEDICARE Plan: Marianna PEREZ*   Payor Address: ,          Group Number: [id number] Insurance Type: INDEMNITY   Subscriber Name: Jarod Camargo : 1950   Subscriber ID: [id number] Pat. Rel. to Sub: Self   SECONDARY INSURANCE   Payor: Benjamin Tracey* Plan: Desmond MCKAY*   Payor Address:  [facility], [address] [place] [address]          Group Number: [id number] Insurance Type: INDEMNITY   Subscriber Name: Jarod Camargo : 1950   Subscriber ID: [id number] Pat.  Rel. to Sub: SELF         CSN: [id number]

## 2022-08-24 NOTE — ED PROVIDER NOTES
16-year-old female presenting from the nursing home with complaint of weakness and low oxygen saturation. She was placed on oxygen upon arrival.  The initial concern was she was confused but patient answers questions appropriately and is oriented. She has chronically ill-appearing, does not know why she is in the nursing home and is not sure why she is in the emergency department today. Chronically ill, persistent, ongoing, now with concern for weakness, atrial fibrillation which have also been ongoing problems. These are moderate in severity, she is little tachycardic now, does have medication for the heart rate which normally is effective and works for her. Family History   Problem Relation Age of Onset    Cancer Mother         Lung Ca    Cancer Father         lung Ca    Diabetes Sister     Heart Disease Sister     Seizures Son     Other Brother         sepsis     Past Surgical History:   Procedure Laterality Date    BREAST LUMPECTOMY      BREAST REDUCTION SURGERY      CARDIAC CATHETERIZATION  4/28/2014    Dr. Sophie Alvarez  01/11/2022    Dr. Karla Yost  7/29/15    CT GUIDED CHEST TUBE  7/8/2022    CT GUIDED CHEST TUBE 7/8/2022 Bryson Castillo MD [facility]    ENDOSCOPY, COLON, DIAGNOSTIC  7/19/15    GALLBLADDER SURGERY      LUMBAR LAMINECTOMY      TOE AMPUTATION      TONSILLECTOMY      UPPER GASTROINTESTINAL ENDOSCOPY      UPPER GASTROINTESTINAL ENDOSCOPY N/A 7/19/2022    EGD ESOPHAGOGASTRODUODENOSCOPY performed by Concepcion Huber MD at [address] [place] [address]       Review of Systems   Constitutional:  Negative for chills and fever. Respiratory:  Negative for chest tightness and shortness of breath. Low oxygen saturation   Cardiovascular:  Positive for palpitations. Neurological:  Positive for weakness. All other systems reviewed and are negative. Physical Exam  Constitutional:       General: She is not in acute distress.      Appearance: She is well-developed. She is obese. HENT:      Head: Normocephalic and atraumatic. Eyes:      Conjunctiva/sclera: Conjunctivae normal.      Pupils: Pupils are equal, round, and reactive to light. Neck:      Thyroid: No thyromegaly. Cardiovascular:      Rate and Rhythm: Normal rate and regular rhythm. Pulmonary:      Effort: Pulmonary effort is normal. No respiratory distress. Breath sounds: Normal breath sounds. Abdominal:      General: There is no distension. Palpations: Abdomen is soft. Tenderness: There is no abdominal tenderness. There is no guarding or rebound. Musculoskeletal:         General: No tenderness. Normal range of motion. Cervical back: Normal range of motion. Skin:     General: Skin is warm and dry. Findings: No erythema. Neurological:      Mental Status: She is alert and oriented to person, place, and time. Cranial Nerves: No cranial nerve deficit. Coordination: Coordination normal.        Procedures     [facility]       ED Course as of 08/28/22 0655   Wed Aug 24, 2022   1443 Patient sleeping, arouses to noxious stimuli, heart rate in the 130s with A. fib. [SO]      ED Course User Index  [SO] Alfa Ortiz DO    EKG: interpreted by me  . Atrial fibrillation with rapid ventricular spots, rate 118, rightward axis, low voltage throughout, no significant ST elevations or depressions, Q waves in the septal leads.     ED Course as of 08/28/22 0655   Wed Aug 24, 2022   1443 Patient sleeping, arouses to noxious stimuli, heart rate in the 130s with A. fib. [SO]      ED Course User Index  [SO] Alfa Ortiz DO       --------------------------------------------- PAST HISTORY ---------------------------------------------  Past Medical History:  has a past medical history of [facility] ([facility] Acute on chronic congestive heart failure ([facility] Anxiety, Asthma, CAD (coronary artery disease), Cancer ([facility] Chronic kidney disease, Depression, Diabetes mellitus ([facility] H/O mammogram, Hx MRSA infection, Hyperlipidemia, Hypertension, Lateral epicondylitis, SERENA on CPAP, Parkinson's disease ([facility] and Tubal ligation status. Past Surgical History:  has a past surgical history that includes Gallbladder surgery; Toe amputation; Cholecystectomy; Colonoscopy (7/29/15); Endoscopy, colon, diagnostic (7/19/15); Upper gastrointestinal endoscopy; lumbar laminectomy; Tonsillectomy; Breast lumpectomy; Breast reduction surgery; Cardiac catheterization (4/28/2014); Cardiac catheterization (01/11/2022); CT GUIDED CHEST TUBE (7/8/2022); and Upper gastrointestinal endoscopy (N/A, 7/19/2022). Social History:  reports that she has never smoked. She has never used smokeless tobacco. She reports that she does not drink alcohol and does not use drugs. Family History: family history includes Cancer in her father and mother; Diabetes in her sister; Heart Disease in her sister; Other in her brother; Seizures in her son. The patients home medications have been reviewed.     Allergies: Ciprofloxacin and Codeine    -------------------------------------------------- RESULTS -------------------------------------------------    LABS:  Results for orders placed or performed during the hospital encounter of 08/24/22   Culture, Blood 1    Specimen: Blood   Result Value Ref Range    Blood Culture, Routine 24 Hours no growth    Culture, Blood 2    Specimen: Blood   Result Value Ref Range    Culture, Blood 2 24 Hours no growth    Respiratory Panel, Molecular, with COVID-19 (Restricted: peds pts or suitable admitted adults)    Specimen: Nasopharyngeal   Result Value Ref Range    Adenovirus by PCR Not Detected Not Detected    Bordetella parapertussis by PCR Not Detected Not Detected    Bordetella pertussis by PCR Not Detected Not Detected    Chlamydophilia pneumoniae by PCR Not Detected Not Detected    Coronavirus 229E by PCR Not Detected Not Detected    Coronavirus HKU1 by PCR Not Detected Not Detected    Coronavirus NL63 by PCR Not Detected Not Detected    Coronavirus OC43 by PCR Not Detected Not Detected    SARS-CoV-2, PCR Not Detected Not Detected    Human Metapneumovirus by PCR Not Detected Not Detected    Human Rhinovirus/Enterovirus by PCR Not Detected Not Detected    Influenza A by PCR Not Detected Not Detected    Influenza B by PCR Not Detected Not Detected    Mycoplasma pneumoniae by PCR Not Detected Not Detected    Parainfluenza Virus 1 by PCR Not Detected Not Detected    Parainfluenza Virus 2 by PCR Not Detected Not Detected    Parainfluenza Virus 3 by PCR Not Detected Not Detected    Parainfluenza Virus 4 by PCR Not Detected Not Detected    Respiratory Syncytial Virus by PCR Not Detected Not Detected   Culture, Body Fluid    Specimen:  Body Fluid   Result Value Ref Range    Body Fluid Culture, Sterile Growth not present, incubation continues     Gram Stain Result       Gram stain performed on unspun fluid  Polymorphonuclear leukocytes not seen  No organisms seen     CBC with Auto Differential   Result Value Ref Range    WBC 8.1 4.5 - 11.5 E9/L    RBC 3.34 (L) 3.50 - 5.50 E12/L    Hemoglobin 9.5 (L) 11.5 - 15.5 g/dL    Hematocrit 33.6 (L) 34.0 - 48.0 %    .6 (H) 80.0 - 99.9 fL    MCH 28.4 26.0 - 35.0 pg    MCHC 28.3 (L) 32.0 - 34.5 %    RDW 15.9 (H) 11.5 - 15.0 fL    Platelets 526 835 - 818 E9/L    MPV 11.1 7.0 - 12.0 fL    Neutrophils % 87.8 (H) 43.0 - 80.0 %    Lymphocytes % 6.1 (L) 20.0 - 42.0 %    Monocytes % 4.3 2.0 - 12.0 %    Eosinophils % 0.9 0.0 - 6.0 %    Basophils % 0.9 0.0 - 2.0 %    Neutrophils Absolute 7.13 1.80 - 7.30 E9/L    Lymphocytes Absolute 0.49 (L) 1.50 - 4.00 E9/L    Monocytes Absolute 0.32 0.10 - 0.95 E9/L    Eosinophils Absolute 0.07 0.05 - 0.50 E9/L    Basophils Absolute 0.07 0.00 - 0.20 E9/L    Polychromasia 1+     Hypochromia 1+     Poikilocytes 1+     Ovalocytes 1+    Comprehensive Metabolic Panel   Result Value Ref Range    Sodium 140 132 - 146 mmol/L    Potassium 5.1 (H) 3.5 - 5.0 (L) 7 - 16 mmol/L    Glucose 178 (H) 74 - 99 mg/dL    BUN 31 (H) 6 - 23 mg/dL    Creatinine 1.1 (H) 0.5 - 1.0 mg/dL    GFR Non-African American 49 >=60 mL/min/1.73    GFR African American 59     Calcium 8.8 8.6 - 10.2 mg/dL   Hepatic function panel   Result Value Ref Range    Total Protein 6.0 (L) 6.4 - 8.3 g/dL    Albumin 3.8 3.5 - 5.2 g/dL    Alkaline Phosphatase 100 35 - 104 U/L    ALT <5 0 - 32 U/L    AST 8 0 - 31 U/L    Total Bilirubin 0.6 0.0 - 1.2 mg/dL    Bilirubin, Direct 0.3 0.0 - 0.3 mg/dL    Bilirubin, Indirect 0.3 0.0 - 1.0 mg/dL   Lactic Acid   Result Value Ref Range    Lactic Acid 2.2 0.5 - 2.2 mmol/L   Protime-INR   Result Value Ref Range    Protime 33.4 (H) 9.3 - 12.4 sec    INR 2.9    Basic Metabolic Panel w/ Reflex to MG   Result Value Ref Range    Sodium 140 132 - 146 mmol/L    Potassium reflex Magnesium 4.9 3.5 - 5.0 mmol/L    Chloride 99 98 - 107 mmol/L    CO2 39 (H) 22 - 29 mmol/L    Anion Gap 2 (L) 7 - 16 mmol/L    Glucose 130 (H) 74 - 99 mg/dL    BUN 27 (H) 6 - 23 mg/dL    Creatinine 1.1 (H) 0.5 - 1.0 mg/dL    GFR Non-African American 49 >=60 mL/min/1.73    GFR African American 59     Calcium 8.6 8.6 - 10.2 mg/dL   Cell Count with Differential, Body Fluid   Result Value Ref Range    Cell Count Fluid Type Pleural     Color, Fluid Yellow     Appearance, Fluid Clear     Nucl Cell, Fluid 154 /uL    RBC, Fluid <2,000 /uL    Neutrophil Count, Fluid 10 %    Monocyte Count, Fluid 90 %   Lactate dehydrogenase, body fluid   Result Value Ref Range    LD, Fluid 49 Not Established U/L    Fluid Type Pleural    Glucose, body fluid   Result Value Ref Range    Glucose, Fluid 189 Not Established mg/dL   Protein, body fluid   Result Value Ref Range    Protein, Fluid 2.0 Not Established g/dL   Lactate Dehydrogenase   Result Value Ref Range     135 - 214 U/L   Blood Gas, Arterial   Result Value Ref Range    Date Analyzed [id number]     Time Analyzed 1823     Source: Blood Arterial     pH, Blood Gas 7.256 (L) 7.350 - 7.450    PCO2 87.4 (HH) 35.0 - 45.0 mmHg    PO2 78.2 75.0 - 100.0 mmHg    HCO3 38.0 (H) 22.0 - 26.0 mmol/L    B.E. 8.5 (H) -3.0 - 3.0 mmol/L    O2 Sat 95.2 92.0 - 98.5 %    O2Hb 94.3 94.0 - 97.0 %    COHb 0.6 0.0 - 1.5 %    MetHb 0.3 0.0 - 1.5 %    O2 Content 13.7 mL/dL    HHb 4.8 0.0 - 5.0 %    tHb (est) 10.3 (L) 11.5 - 16.5 g/dL    Mode NC-  4L     Comment With read back     Date Of Collection      Time Collected      Pt Temp 37.0 C          Lab [id number]     Critical(s) Notified Called to RONEY Cunha RN    Pleural Fluid pH   Result Value Ref Range    Date Analyzed [id number]     Time Analyzed 4553     Source: Pleural Fluid     pH, Fluid (>)     Comments: pH is > 7.500     Date Of Collection      Time Collected       ID [id number]     Lab [id number]     Critical(s) Notified NA (for pleural fluid)    Blood Gas, Arterial   Result Value Ref Range    Date Analyzed [id number]     Time Analyzed 1723     Source: Blood Arterial     pH, Blood Gas 7.256 (L) 7.350 - 7.450    PCO2 83.9 (HH) 35.0 - 45.0 mmHg    PO2 65.6 (L) 75.0 - 100.0 mmHg    HCO3 36.5 (H) 22.0 - 26.0 mmol/L    B.E. 7.1 (H) -3.0 - 3.0 mmol/L    O2 Sat 91.9 (L) 92.0 - 98.5 %    O2Hb 91.3 (L) 94.0 - 97.0 %    COHb 0.4 0.0 - 1.5 %    MetHb 0.3 0.0 - 1.5 %    O2 Content 13.7 mL/dL    HHb 8.0 (H) 0.0 - 5.0 %    tHb (est) 10.6 (L) 11.5 - 16.5 g/dL    Mode NC- 4 L     Comment with readback     Date Of Collection      Time Collected      Pt Temp 37.0 C     ID [id number]     Lab [id number]     Critical(s) Notified Called to YENI Young RN    Basic Metabolic Panel w/ Reflex to MG   Result Value Ref Range    Sodium 141 132 - 146 mmol/L    Potassium reflex Magnesium 4.3 3.5 - 5.0 mmol/L    Chloride 102 98 - 107 mmol/L    CO2 33 (H) 22 - 29 mmol/L    Anion Gap 6 (L) 7 - 16 mmol/L    Glucose 139 (H) 74 - 99 mg/dL    BUN 22 6 - 23 mg/dL    Creatinine 1.0 0.5 - 1.0 mg/dL    GFR Non-African American 54 >=60 mL/min/1.73    GFR African American >60     Calcium 8.1 (L) 8.6 - 10.2 mg/dL   POCT Glucose   Result Value Ref Range    Meter Glucose 170 (H) 74 - 99 mg/dL   POCT Glucose   Result Value Ref Range    Meter Glucose 137 (H) 74 - 99 mg/dL   POCT Glucose   Result Value Ref Range    Meter Glucose 170 (H) 74 - 99 mg/dL   POCT Glucose   Result Value Ref Range    Meter Glucose 188 (H) 74 - 99 mg/dL   POCT Glucose   Result Value Ref Range    Meter Glucose 171 (H) 74 - 99 mg/dL   POCT Glucose   Result Value Ref Range    Meter Glucose 211 (H) 74 - 99 mg/dL   POCT Glucose   Result Value Ref Range    Meter Glucose 117 (H) 74 - 99 mg/dL   POCT Glucose   Result Value Ref Range    Meter Glucose 172 (H) 74 - 99 mg/dL   POCT Glucose   Result Value Ref Range    Meter Glucose 148 (H) 74 - 99 mg/dL   POCT Glucose   Result Value Ref Range    Meter Glucose 137 (H) 74 - 99 mg/dL   POCT Glucose   Result Value Ref Range    Meter Glucose 134 (H) 74 - 99 mg/dL   POCT Glucose   Result Value Ref Range    Meter Glucose 282 (H) 74 - 99 mg/dL   POCT Glucose   Result Value Ref Range    Meter Glucose 122 (H) 74 - 99 mg/dL   POCT Glucose   Result Value Ref Range    Meter Glucose 126 (H) 74 - 99 mg/dL       RADIOLOGY:  IR GUIDED THORACENTESIS PLEURAL   Final Result   Successful ultrasound guided thoracentesis. XR CHEST 1 VIEW   Final Result   1. There is no right pneumothorax status post right thoracentesis   2. Right lung base atelectasis   3. The left lung is clear. XR CHEST PORTABLE   Final Result   1. Cardiomegaly with findings of CHF   2. Moderate size right pleural effusion   3. Trace left pleural effusion               ------------------------- NURSING NOTES AND VITALS REVIEWED ---------------------------  Date / Time Roomed:  8/24/2022 11:55 AM  ED Bed Assignment:  [id number]    The nursing notes within the ED encounter and vital signs as below have been reviewed.      Patient Vitals for the past 24 hrs:   BP Temp Temp src Pulse Resp SpO2 Weight   08/28/22 0456 -- -- -- -- 17 -- -- 08/28/22 0126 -- -- -- -- -- -- 239 lb 4 oz (108.5 kg)   08/28/22 0020 121/65 98.2 °F (36.8 °C) Infrared (!) 117 18 98 % --   08/27/22 1941 (!) 114/58 98.7 °F (37.1 °C) Axillary (!) 108 20 99 % --   08/27/22 1837 -- -- -- -- -- 95 % --   08/27/22 1330 120/68 96.9 °F (36.1 °C) Axillary 89 20 97 % --   08/27/22 1100 -- -- -- 91 -- -- --   08/27/22 0730 109/68 97.8 °F (36.6 °C) Axillary 86 20 95 % --       Oxygen Saturation Interpretation: Abnormal and Improved after treatment    ------------------------------------------ PROGRESS NOTES ------------------------------------------  Re-evaluation(s):   Patients symptoms are improving  Repeat physical examination is improved    Counseling:  I have spoken with the patient and discussed todays results, in addition to providing specific details for the plan of care and counseling regarding the diagnosis and prognosis. Their questions are answered at this time and they are agreeable with the plan of admission. CRITICAL CARE:   33 MINUTES. Please note that the withdrawal or failure to initiate urgent interventions for this patient would likely result in a life threatening deterioration or permanent disability. Accordingly this patient received the above mentioned time, excluding separately billable procedures. Patient required immediate intervention for her significant hypoxemia, found to be rapid ventricular response with her atrial fibrillation. History of rhythm disorders, uncontrolled at this time. We will bit to the hospital on a Cardizem drip and continuing requirement of oxygen supplementation.  --------------------------------- ADDITIONAL PROVIDER NOTES ---------------------------------  Consultations:   Spoke with admitting team.  Discussed case. They will admit the patient.   This patient's ED course included: a personal history and physicial examination, re-evaluation prior to disposition, multiple bedside re-evaluations, and IV medications    This patient has remained hemodynamically stable during their ED course. Diagnosis:  1. Acute respiratory failure with hypoxia ([address]    2. Atrial fibrillation with RVR (HCC)        Disposition:  Patient's disposition: Admit to telemetry  Patient's condition is stable.            Courtney Angeles DO  08/28/22 5703

## 2022-08-24 NOTE — ED NOTES
Tracey Mcmillan child is the [facility]  [id number]     Carolina Wall RN  08/24/22 3053

## 2022-08-25 PROBLEM — D75.89 MACROCYTOSIS: Status: ACTIVE | Noted: 2022-08-25

## 2022-08-25 PROBLEM — D64.89 OTHER SPECIFIED ANEMIAS: Status: ACTIVE | Noted: 2022-08-25

## 2022-08-25 LAB
ADENOVIRUS BY PCR: NOT DETECTED
ALBUMIN SERPL-MCNC: 3.8 G/DL (ref 3.5–5.2)
ALP BLD-CCNC: 100 U/L (ref 35–104)
ALT SERPL-CCNC: <5 U/L (ref 0–32)
ANION GAP SERPL CALCULATED.3IONS-SCNC: 4 MMOL/L (ref 7–16)
ANISOCYTOSIS: ABNORMAL
AST SERPL-CCNC: 8 U/L (ref 0–31)
B.E.: 8.5 MMOL/L (ref -3–3)
BASOPHILIC STIPPLING: ABNORMAL
BASOPHILS ABSOLUTE: 0 E9/L (ref 0–0.2)
BASOPHILS RELATIVE PERCENT: 0.4 % (ref 0–2)
BILIRUB SERPL-MCNC: 0.6 MG/DL (ref 0–1.2)
BILIRUBIN DIRECT: 0.3 MG/DL (ref 0–0.3)
BILIRUBIN, INDIRECT: 0.3 MG/DL (ref 0–1)
BORDETELLA PARAPERTUSSIS BY PCR: NOT DETECTED
BORDETELLA PERTUSSIS BY PCR: NOT DETECTED
BUN BLDV-MCNC: 31 MG/DL (ref 6–23)
CALCIUM SERPL-MCNC: 8.8 MG/DL (ref 8.6–10.2)
CHLAMYDOPHILIA PNEUMONIAE BY PCR: NOT DETECTED
CHLORIDE BLD-SCNC: 95 MMOL/L (ref 98–107)
CO2: 38 MMOL/L (ref 22–29)
COHB: 0.6 % (ref 0–1.5)
COMMENT: ABNORMAL
CORONAVIRUS 229E BY PCR: NOT DETECTED
CORONAVIRUS HKU1 BY PCR: NOT DETECTED
CORONAVIRUS NL63 BY PCR: NOT DETECTED
CORONAVIRUS OC43 BY PCR: NOT DETECTED
CREAT SERPL-MCNC: 1.1 MG/DL (ref 0.5–1)
CRITICAL: ABNORMAL
DATE ANALYZED: ABNORMAL
DATE OF COLLECTION: ABNORMAL
EOSINOPHILS ABSOLUTE: 0.08 E9/L (ref 0.05–0.5)
EOSINOPHILS RELATIVE PERCENT: 0.9 % (ref 0–6)
GFR AFRICAN AMERICAN: 59
GFR NON-AFRICAN AMERICAN: 49 ML/MIN/1.73
GLUCOSE BLD-MCNC: 178 MG/DL (ref 74–99)
HCO3: 38 MMOL/L (ref 22–26)
HCT VFR BLD CALC: 31.5 % (ref 34–48)
HEMOGLOBIN: 9 G/DL (ref 11.5–15.5)
HHB: 4.8 % (ref 0–5)
HUMAN METAPNEUMOVIRUS BY PCR: NOT DETECTED
HUMAN RHINOVIRUS/ENTEROVIRUS BY PCR: NOT DETECTED
INFLUENZA A BY PCR: NOT DETECTED
INFLUENZA B BY PCR: NOT DETECTED
INR BLD: 2.9
LAB: ABNORMAL
LACTIC ACID: 2.2 MMOL/L (ref 0.5–2.2)
LYMPHOCYTES ABSOLUTE: 0.9 E9/L (ref 1.5–4)
LYMPHOCYTES RELATIVE PERCENT: 10.4 % (ref 20–42)
Lab: ABNORMAL
MCH RBC QN AUTO: 28.1 PG (ref 26–35)
MCHC RBC AUTO-ENTMCNC: 28.6 % (ref 32–34.5)
MCV RBC AUTO: 98.4 FL (ref 80–99.9)
METER GLUCOSE: 170 MG/DL (ref 74–99)
METER GLUCOSE: 171 MG/DL (ref 74–99)
METER GLUCOSE: 188 MG/DL (ref 74–99)
METER GLUCOSE: 211 MG/DL (ref 74–99)
METHB: 0.3 % (ref 0–1.5)
MODE: ABNORMAL
MONOCYTES ABSOLUTE: 0.36 E9/L (ref 0.1–0.95)
MONOCYTES RELATIVE PERCENT: 3.5 % (ref 2–12)
MYCOPLASMA PNEUMONIAE BY PCR: NOT DETECTED
MYELOCYTE PERCENT: 0.9 % (ref 0–0)
NEUTROPHILS ABSOLUTE: 7.65 E9/L (ref 1.8–7.3)
NEUTROPHILS RELATIVE PERCENT: 84.3 % (ref 43–80)
O2 CONTENT: 13.7 ML/DL
O2 SATURATION: 95.2 % (ref 92–98.5)
O2HB: 94.3 % (ref 94–97)
OPERATOR ID: 797
PARAINFLUENZA VIRUS 1 BY PCR: NOT DETECTED
PARAINFLUENZA VIRUS 2 BY PCR: NOT DETECTED
PARAINFLUENZA VIRUS 3 BY PCR: NOT DETECTED
PARAINFLUENZA VIRUS 4 BY PCR: NOT DETECTED
PATIENT TEMP: 37 C
PCO2: 87.4 MMHG (ref 35–45)
PDW BLD-RTO: 16.1 FL (ref 11.5–15)
PH BLOOD GAS: 7.26 (ref 7.35–7.45)
PLATELET # BLD: 249 E9/L (ref 130–450)
PMV BLD AUTO: 11 FL (ref 7–12)
PO2: 78.2 MMHG (ref 75–100)
POLYCHROMASIA: ABNORMAL
POTASSIUM REFLEX MAGNESIUM: 5.4 MMOL/L (ref 3.5–5)
PROTHROMBIN TIME: 33.4 SEC (ref 9.3–12.4)
RBC # BLD: 3.2 E12/L (ref 3.5–5.5)
RESPIRATORY SYNCYTIAL VIRUS BY PCR: NOT DETECTED
SARS-COV-2, PCR: NOT DETECTED
SODIUM BLD-SCNC: 137 MMOL/L (ref 132–146)
SOURCE, BLOOD GAS: ABNORMAL
THB: 10.3 G/DL (ref 11.5–16.5)
TIME ANALYZED: 1823
TOTAL PROTEIN: 6 G/DL (ref 6.4–8.3)
WBC # BLD: 9 E9/L (ref 4.5–11.5)

## 2022-08-25 PROCEDURE — 36410 VNPNXR 3YR/> PHY/QHP DX/THER: CPT

## 2022-08-25 PROCEDURE — 85610 PROTHROMBIN TIME: CPT

## 2022-08-25 PROCEDURE — C1751 CATH, INF, PER/CENT/MIDLINE: HCPCS

## 2022-08-25 PROCEDURE — 0202U NFCT DS 22 TRGT SARS-COV-2: CPT

## 2022-08-25 PROCEDURE — 83605 ASSAY OF LACTIC ACID: CPT

## 2022-08-25 PROCEDURE — 2580000003 HC RX 258: Performed by: FAMILY MEDICINE

## 2022-08-25 PROCEDURE — 2700000000 HC OXYGEN THERAPY PER DAY

## 2022-08-25 PROCEDURE — 6370000000 HC RX 637 (ALT 250 FOR IP): Performed by: FAMILY MEDICINE

## 2022-08-25 PROCEDURE — 82962 GLUCOSE BLOOD TEST: CPT

## 2022-08-25 PROCEDURE — 6360000002 HC RX W HCPCS: Performed by: FAMILY MEDICINE

## 2022-08-25 PROCEDURE — 99233 SBSQ HOSP IP/OBS HIGH 50: CPT | Performed by: FAMILY MEDICINE

## 2022-08-25 PROCEDURE — 85025 COMPLETE CBC W/AUTO DIFF WBC: CPT

## 2022-08-25 PROCEDURE — 94640 AIRWAY INHALATION TREATMENT: CPT

## 2022-08-25 PROCEDURE — 80076 HEPATIC FUNCTION PANEL: CPT

## 2022-08-25 PROCEDURE — APPSS60 APP SPLIT SHARED TIME 46-60 MINUTES: Performed by: CLINICAL NURSE SPECIALIST

## 2022-08-25 PROCEDURE — 99223 1ST HOSP IP/OBS HIGH 75: CPT | Performed by: INTERNAL MEDICINE

## 2022-08-25 PROCEDURE — 36415 COLL VENOUS BLD VENIPUNCTURE: CPT

## 2022-08-25 PROCEDURE — 2060000000 HC ICU INTERMEDIATE R&B

## 2022-08-25 PROCEDURE — 05HC33Z INSERTION OF INFUSION DEVICE INTO LEFT BASILIC VEIN, PERCUTANEOUS APPROACH: ICD-10-PCS | Performed by: INTERNAL MEDICINE

## 2022-08-25 PROCEDURE — 80048 BASIC METABOLIC PNL TOTAL CA: CPT

## 2022-08-25 PROCEDURE — 76937 US GUIDE VASCULAR ACCESS: CPT

## 2022-08-25 PROCEDURE — 82805 BLOOD GASES W/O2 SATURATION: CPT

## 2022-08-25 PROCEDURE — 2580000003 HC RX 258: Performed by: INTERNAL MEDICINE

## 2022-08-25 PROCEDURE — 94660 CPAP INITIATION&MGMT: CPT

## 2022-08-25 PROCEDURE — 36600 WITHDRAWAL OF ARTERIAL BLOOD: CPT

## 2022-08-25 RX ORDER — SODIUM CHLORIDE 9 MG/ML
INJECTION, SOLUTION INTRAVENOUS PRN
Status: DISCONTINUED | OUTPATIENT
Start: 2022-08-25 | End: 2022-09-08 | Stop reason: HOSPADM

## 2022-08-25 RX ORDER — INSULIN LISPRO 100 [IU]/ML
0-8 INJECTION, SOLUTION INTRAVENOUS; SUBCUTANEOUS
Status: DISCONTINUED | OUTPATIENT
Start: 2022-08-25 | End: 2022-09-08 | Stop reason: HOSPADM

## 2022-08-25 RX ORDER — HEPARIN SODIUM (PORCINE) LOCK FLUSH IV SOLN 100 UNIT/ML 100 UNIT/ML
1 SOLUTION INTRAVENOUS EVERY 12 HOURS SCHEDULED
Status: DISCONTINUED | OUTPATIENT
Start: 2022-08-25 | End: 2022-09-08 | Stop reason: HOSPADM

## 2022-08-25 RX ORDER — SODIUM CHLORIDE 0.9 % (FLUSH) 0.9 %
5-40 SYRINGE (ML) INJECTION EVERY 12 HOURS SCHEDULED
Status: DISCONTINUED | OUTPATIENT
Start: 2022-08-25 | End: 2022-09-08 | Stop reason: HOSPADM

## 2022-08-25 RX ORDER — INSULIN LISPRO 100 [IU]/ML
0-4 INJECTION, SOLUTION INTRAVENOUS; SUBCUTANEOUS NIGHTLY
Status: DISCONTINUED | OUTPATIENT
Start: 2022-08-25 | End: 2022-09-08 | Stop reason: HOSPADM

## 2022-08-25 RX ORDER — INSULIN LISPRO 100 [IU]/ML
3 INJECTION, SOLUTION INTRAVENOUS; SUBCUTANEOUS
Status: DISCONTINUED | OUTPATIENT
Start: 2022-08-25 | End: 2022-09-08 | Stop reason: HOSPADM

## 2022-08-25 RX ORDER — SODIUM POLYSTYRENE SULFONATE 15 G/60ML
15 SUSPENSION ORAL; RECTAL ONCE
Status: COMPLETED | OUTPATIENT
Start: 2022-08-25 | End: 2022-08-25

## 2022-08-25 RX ORDER — HEPARIN SODIUM (PORCINE) LOCK FLUSH IV SOLN 100 UNIT/ML 100 UNIT/ML
1 SOLUTION INTRAVENOUS PRN
Status: DISCONTINUED | OUTPATIENT
Start: 2022-08-25 | End: 2022-09-08 | Stop reason: HOSPADM

## 2022-08-25 RX ORDER — SODIUM CHLORIDE 9 MG/ML
INJECTION, SOLUTION INTRAVENOUS CONTINUOUS
Status: DISCONTINUED | OUTPATIENT
Start: 2022-08-25 | End: 2022-08-28

## 2022-08-25 RX ORDER — BENZOIN
TINCTURE TOPICAL NIGHTLY
Status: DISCONTINUED | OUTPATIENT
Start: 2022-08-25 | End: 2022-09-08 | Stop reason: HOSPADM

## 2022-08-25 RX ORDER — SODIUM CHLORIDE 0.9 % (FLUSH) 0.9 %
5-40 SYRINGE (ML) INJECTION PRN
Status: DISCONTINUED | OUTPATIENT
Start: 2022-08-25 | End: 2022-09-08 | Stop reason: HOSPADM

## 2022-08-25 RX ORDER — INSULIN GLARGINE 100 [IU]/ML
10 INJECTION, SOLUTION SUBCUTANEOUS NIGHTLY
Status: DISCONTINUED | OUTPATIENT
Start: 2022-08-25 | End: 2022-09-01

## 2022-08-25 RX ADMIN — Medication 10 ML: at 21:18

## 2022-08-25 RX ADMIN — ANTI-FUNGAL POWDER MICONAZOLE NITRATE TALC FREE 1 EACH: 1.42 POWDER TOPICAL at 08:12

## 2022-08-25 RX ADMIN — BUDESONIDE 500 MCG: 0.5 SUSPENSION RESPIRATORY (INHALATION) at 06:21

## 2022-08-25 RX ADMIN — ANTI-FUNGAL POWDER MICONAZOLE NITRATE TALC FREE 1 EACH: 1.42 POWDER TOPICAL at 21:03

## 2022-08-25 RX ADMIN — IPRATROPIUM BROMIDE AND ALBUTEROL SULFATE 3 ML: .5; 2.5 SOLUTION RESPIRATORY (INHALATION) at 14:19

## 2022-08-25 RX ADMIN — CARBIDOPA AND LEVODOPA 2 TABLET: 25; 100 TABLET, EXTENDED RELEASE ORAL at 14:03

## 2022-08-25 RX ADMIN — SUCRALFATE 1 G: 1 TABLET ORAL at 21:17

## 2022-08-25 RX ADMIN — AMIODARONE HYDROCHLORIDE 200 MG: 200 TABLET ORAL at 08:13

## 2022-08-25 RX ADMIN — HEPARIN 100 UNITS: 100 SYRINGE at 21:02

## 2022-08-25 RX ADMIN — SODIUM POLYSTYRENE SULFONATE 15 G: 15 SUSPENSION ORAL; RECTAL at 21:01

## 2022-08-25 RX ADMIN — ROPINIROLE HYDROCHLORIDE 1 MG: 1 TABLET, FILM COATED ORAL at 08:13

## 2022-08-25 RX ADMIN — SODIUM CHLORIDE: 9 INJECTION, SOLUTION INTRAVENOUS at 14:09

## 2022-08-25 RX ADMIN — BUDESONIDE 500 MCG: 0.5 SUSPENSION RESPIRATORY (INHALATION) at 18:03

## 2022-08-25 RX ADMIN — ROPINIROLE HYDROCHLORIDE 1 MG: 1 TABLET, FILM COATED ORAL at 14:03

## 2022-08-25 RX ADMIN — PANTOPRAZOLE SODIUM 40 MG: 40 TABLET, DELAYED RELEASE ORAL at 08:13

## 2022-08-25 RX ADMIN — METOPROLOL TARTRATE 12.5 MG: 25 TABLET, FILM COATED ORAL at 08:13

## 2022-08-25 RX ADMIN — INSULIN GLARGINE 10 UNITS: 100 INJECTION, SOLUTION SUBCUTANEOUS at 21:03

## 2022-08-25 RX ADMIN — IPRATROPIUM BROMIDE AND ALBUTEROL SULFATE 3 ML: .5; 2.5 SOLUTION RESPIRATORY (INHALATION) at 09:10

## 2022-08-25 RX ADMIN — ASPIRIN 81 MG: 81 TABLET, COATED ORAL at 08:13

## 2022-08-25 RX ADMIN — CARBIDOPA AND LEVODOPA 2 TABLET: 25; 100 TABLET, EXTENDED RELEASE ORAL at 08:13

## 2022-08-25 RX ADMIN — IPRATROPIUM BROMIDE AND ALBUTEROL SULFATE 3 ML: .5; 2.5 SOLUTION RESPIRATORY (INHALATION) at 06:21

## 2022-08-25 RX ADMIN — SUCRALFATE 1 G: 1 TABLET ORAL at 11:42

## 2022-08-25 RX ADMIN — CARBIDOPA AND LEVODOPA 2 TABLET: 25; 100 TABLET, EXTENDED RELEASE ORAL at 21:15

## 2022-08-25 RX ADMIN — ROPINIROLE HYDROCHLORIDE 1 MG: 1 TABLET, FILM COATED ORAL at 21:01

## 2022-08-25 RX ADMIN — APIXABAN 5 MG: 5 TABLET, FILM COATED ORAL at 08:13

## 2022-08-25 RX ADMIN — SUCRALFATE 1 G: 1 TABLET ORAL at 08:13

## 2022-08-25 RX ADMIN — IPRATROPIUM BROMIDE AND ALBUTEROL SULFATE 3 ML: .5; 2.5 SOLUTION RESPIRATORY (INHALATION) at 18:03

## 2022-08-25 RX ADMIN — SUCRALFATE 1 G: 1 TABLET ORAL at 16:19

## 2022-08-25 RX ADMIN — APIXABAN 5 MG: 5 TABLET, FILM COATED ORAL at 21:01

## 2022-08-25 RX ADMIN — LORAZEPAM 0.5 MG: 0.5 TABLET ORAL at 11:42

## 2022-08-25 RX ADMIN — MONTELUKAST 10 MG: 10 TABLET, FILM COATED ORAL at 21:01

## 2022-08-25 RX ADMIN — Medication 5 MG: at 21:01

## 2022-08-25 RX ADMIN — ATORVASTATIN CALCIUM 20 MG: 20 TABLET, FILM COATED ORAL at 21:01

## 2022-08-25 ASSESSMENT — PAIN SCALES - GENERAL: PAINLEVEL_OUTOF10: 0

## 2022-08-25 NOTE — PLAN OF CARE
Problem: ABCDS Injury Assessment  Goal: Absence of physical injury  Outcome: Progressing  Flowsheets (Taken 8/24/2022 2032)  Absence of Physical Injury: Implement safety measures based on patient assessment     Problem: Safety - Adult  Goal: Free from fall injury  Outcome: Progressing     Problem: Skin/Tissue Integrity  Goal: Absence of new skin breakdown  Description: 1. Monitor for areas of redness and/or skin breakdown  2. Assess vascular access sites hourly  3. Every 4-6 hours minimum:  Change oxygen saturation probe site  4. Every 4-6 hours:  If on nasal continuous positive airway pressure, respiratory therapy assess nares and determine need for appliance change or resting period.   Outcome: Progressing

## 2022-08-25 NOTE — CONSULTS
Inpatient Cardiology Consultation      Reason for Consult:  Atrial fibrillation     Consulting Physician: Dr. Bryn Hodge    Requesting Physician:  Dr. Desiree Hidalgo     Date of Consultation: 8/25/2022    History of Present Illness:   Ms. Gio Shaffer is a 66year old lady who is known to Dr. Xochilt Putnam service. She has had multiple admissions this year, but has not followed with us as an outpatient. She is known to have PAF, nonobstructive CAD, and chronic HFpEF. Her last admission was in July 2022 at which time she presented with acute hypoxic respiratory failure and sepsis requiring vasopressors. She was seen in consultation by Dr. Hayes Glover due to AF with RVR, and was started on amiodarone. Following prolonged admission, she was discharged back to SNF on July 27. She returned to the ER yesterday due to altered mentation and hypoxia. She was also found to have SHANTANU and hyperkalemia. She was started on Cardizem drip due to rapid AF. Arterial blood gases could not be obtained and she has is now on 4 liter oxygen with SpO2 of 96%. She is unable to provide significant history and much of the information for the consult is taken from review of the EMR. Past Medical History:    08/10/11 Lexiscan MPS [facility]):  Normal Lexiscan portion. No evidence for inducible ischemia. No previous myocardial infarction. Ejection fraction 77%. Lexiscan MPS 04/25/2014: Reversible inferolateral wall perfusion defect. Finding suggests left ventricular myocardium at risk for stress-induced ischemia. EF >70%. TTE 04/27/2014 (Dr. Hayes Glover): Normal left ventricle size and systolic function. No wall motion abnormalities. Mild concentric left ventricular hypertrophy. Ejection fraction is visually estimated at >55%. Normal right ventricular size and function.  Aortic root is sclerotic and calcified  Cardiac Catheterization (Dr. Hayes Glover) 04/28/2014: Left main:  The left main artery is a short vessel which did not appear to have any significant angiographic disease. Left anterior descending: The left anterior descending artery is a moderate-sized vessel which has minor luminal irregularities in its middle segment but without any significant angiographic luminal narrowing. The diagonal branches also did not appear to have any significant disease. Left circumflex: The left circumflex is a large, codominant vessel which did not appear to have any significant angiographic disease. Right coronary artery:  The right coronary artery is a moderate-sized codominant vessel which did not appear to have any significant angiographic disease. LEFT VENTRICULOGRAPHY:  The left ventricle is normal in size and contractility with an estimated ejection fraction of 60% to 65%. There was no mitral regurgitation. RIGHT FEMORAL ARTERY ANGIOGRAPHY:  The right common femoral artery and the proximal segments of the right superficial femoral artery and the right profunda did not appear to have any significant disease. TTE 02/07/2021 (Dr. Ricky Lopez): Normal left ventricle size and systolic function. Ejection fraction is visually estimated at 65-70%. No regional wall motion abnormalities seen. Moderate concentric left ventricular hypertrophy. Normal right ventricular size and function. No systolic mitral regurgitation noted. No hemodynamically significant aortic stenosis is present. Unable to estimate PA systolic pressure. No evidence for hemodynamically significant pericardial effusion. Valves were not well visualized, please consider PAT if clinical suspicion for endocarditis is high  Lexiscan MPS 01/10/2022: ECG during the infusion did not change. The myocardial perfusion imaging was abnormal. The abnormality was a large sized, moderate fixed defect involving the inferior and inferolateral wall suggestive prior infarct without any reversibility. There was also a small sized mild perfusion defect involving the mid to distal anterior wall suggestive ischemia.  Overall left ventricular systolic function was normal without regional wall motion abnormalities. No transient ischemic dilatation. High risk general pharmacologic stress test.  Cardiac catheterization 1/11/2022: CONCLUSIONS:  Coronary artery disease: Left main. No significant disease. LAD. Heavily calcified proximal and mid vessel with 50% mid vessel stenosis. 60% proximal stenosis of a moderate size first diagonal branch. LCX. 50% ostial narrowing of the AV groove branch in the mid vessel which is a bifurcation lesion with a large marginal branch which itself did not appear to have any significant disease. The AV groove branch of the left circumflex continues to provide a posterior descending artery branch and the posterolateral branch (codominant vessel). RCA. Codominant vessel with no significant angiographic disease. Normal left ventricular size with hyperdynamic left ventricular systolic function with an estimated ejection fraction of 75%. Systemic hypertension. Elevated left ventricular end-diastolic pressure (18)  8. Echocardiogram 1/11/2022:  Left ventricle is normal in size. Moderate concentric left ventricular hypertrophy. No     regional wall motion abnormalities seen. Ejection fraction is visually estimated at 70+/-5%. There is doppler evidence of stage I diastolic dysfunction. Mildly dilated right ventricle. Right ventricle global systolic function is normal ( TAPSE = 1.8 ). Normal size atria. No significant valvular abnormalities noted. 9. Morbid obesity. 10. Diabetes mellitus. 11. Hyperlipidemia. 12. Hypertension. 13. Asthma. 14. SERENA with noncompliance with Cpap  15. Stomach ulcers. 10/29/13, status post EGD and total colonoscopy (Dr. Juana Gongora):  Normal esophagus, normal stomach, normal duodenum. Colonoscopy was completel normal to the ileocecal valve  16. Anxiety   17. Status post bilateral breast reduction surgery.     18. Right breast cancer, status post right lumpectomy with radiation therapy. 19. Status post tonsillectomy and adenoidectomy. 20. Status post tubal ligation. 21. Status post right tube and ovary surgical removal.  22. Status post cholecystectomy. 23. Status post surgical removal of adhesions. 24. Status post excision of sebaceous cyst from abdomen. 25. Status post  release of hammertoes bilaterally. 26. Status post lumbar laminectomy for herniated L4-L5 disk. 27. 08/03/13, bone biopsy:  Ulcerations with retrograde joint contracture of the proximal interphalangeal joint, 2nd digit of right and 3rd digit of left. Status post deep soft tissue cultures with known biopsies and correction of deformity of the 2nd digit of the right and 3rd digit of left (Dr. Martine Rodríguez). 28. Iron-deficiency anemia. 29. Admitted in March 2022 with asthma exacerbation   30. Admitted in April 2022 with mechanical fall and rapid atrial fibrillation. She was started on Eliquis, beta blocker was titrated and she had successful PAT guided cardioversion. 32. PAT 4/18/22: Normal left ventricle size and systolic function. Ejection fraction is visually estimated at 60%. No regional wall motion abnormalities seen. Moderately dilated left atrium. No evidence of thrombus within left atrium or appendage. Agitated saline injected for shunt evaluation. No evidence of patent foramen ovale on bubble study. NAIF emptying velocity 18 cm/sec. Normal right ventricular size and function. Mild mitral regurgitation is present. No hemodynamically significant aortic stenosis is present. Mild tricuspid regurgitation. RVSP is 32 mmHg. Normal estimated PA systolic pressure. No evidence for hemodynamically significant pericardial effusion  32. Admitted in May 2022 with acute HFpEF and rapid atrial fibrillation. S/p successful DCCV 5/16/2022  33. Right toe amputation June 2022   34.  Admitted in July 2022 with acute hypoxic respiratory failure, requiring intubation and hypotension, treated with Levophed. She had witnessed seizure activity which was thought to be due to hypoxia. She was seen in consultation by Dr. Johnathon Velasquez due to rapid AF and started on Amiodarone. She also received digibind for digoxin toxicity. She had chest tube inserted for right pleural effusions. Due to anemia she was transfused and had EGD 7/19/2022: moderate diffuse gastritis and duodenitis. She was also followed by hematology due to pancytopenia   35. TTE 7/07/2022: Technically difficult study - limited visualization. Micro-bubble contrast injected to enhance left ventricular visualization. Normal left ventricular size and systolic function. Ejection fraction is visually estimated at 70-75%. Indeterminate diastolic function. No regional wall motion abnormalities seen. Mild left ventricular concentric hypertrophy noted. Moderately dilated right ventricle with reduced function. Biatrial dilation. Mild tricuspid regurgitation. RVSP is at least 60 mmHg. Prominent pericardial fat pad. No definitive evidence of pericardial effusion. Medications Prior to Admit:  Prior to Admission medications    Medication Sig Start Date End Date Taking? Authorizing Provider   atorvastatin (LIPITOR) 20 MG tablet Take 20 mg by mouth nightly   Yes Historical Provider, MD   benzoin compound solution Apply 1 Applicatorful topically nightly Apply topically to right toe   Yes Historical Provider, MD   bumetanide (BUMEX) 1 MG tablet Take 1 mg by mouth daily 8/24/22 8/30/22 Yes Historical Provider, MD   ipratropium-albuterol (DUONEB) 0.5-2.5 (3) MG/3ML SOLN nebulizer solution Inhale 1 vial into the lungs 4 times daily   Yes Historical Provider, MD   LORazepam (ATIVAN) 0.5 MG tablet Take 0.5 mg by mouth every 12 hours as needed for Anxiety.    Yes Historical Provider, MD   melatonin 5 MG TABS tablet Take 5 mg by mouth nightly   Yes Historical Provider, MD   miconazole (MICOTIN) 2 % powder Apply 1 each topically 2 times daily Apply topically under breasts twice daily   Yes Historical Provider, MD   insulin aspart (NOVOLOG) 100 UNIT/ML injection vial Inject 0-10 Units into the skin 3 times daily (before meals) Blood Glucose 140 - 199 = 1 Unit  Blood Glucose 200 - 249 = 2 Units  Blood Glucose 250 - 299 = 4 Units  Blood Glucose 300 - 349 = 6 Units  Blood Glucose 350 - 399 = 8 Units  Blood Glucose 400+  = 10 Units   Yes Historical Provider, MD   pantoprazole (PROTONIX) 40 MG tablet Take 40 mg by mouth every morning   Yes Historical Provider, MD   promethazine (PHENERGAN) 25 MG tablet Take 25 mg by mouth every 6 hours as needed for Nausea   Yes Historical Provider, MD   mirtazapine (REMERON) 15 MG tablet Take 15 mg by mouth nightly   Yes Historical Provider, MD   budesonide-formoterol (SYMBICORT) 160-4.5 MCG/ACT AERO Inhale 2 puffs into the lungs 2 times daily   Yes Historical Provider, MD   acetaminophen (TYLENOL) 325 MG tablet Take 650 mg by mouth every 4 hours as needed for Pain   Yes Historical Provider, MD   OXYGEN Inhale 2 L into the lungs continuous   Yes Historical Provider, MD   metoprolol tartrate (LOPRESSOR) 25 MG tablet Take 0.5 tablets by mouth in the morning and 0.5 tablets before bedtime. 7/27/22   Tommy Wilson MD   amiodarone (CORDARONE) 200 MG tablet Take 1 tablet by mouth in the morning. 7/24/22   Tommy Wilson MD   budesonide (PULMICORT) 0.5 MG/2ML nebulizer suspension Take 2 mLs by nebulization in the morning and 2 mLs in the evening. 7/23/22   Tommy Wilson MD   apixaban (ELIQUIS) 5 MG TABS tablet Take 1 tablet by mouth in the morning and 1 tablet before bedtime.  7/23/22   Tommy Wilson MD   insulin glargine-yfgn [facility]) 100 UNIT/ML injection vial Inject 5 Units into the skin nightly 7/23/22   Tommy Wilson MD   carbidopa-levodopa (SINEMET CR)  MG per extended release tablet Take 2 tablets by mouth in the morning and 2 tablets at noon and 2 tablets in the evening. 7/23/22   Tommy Wilson MD rOPINIRole (REQUIP) 1 MG tablet Take 1 tablet by mouth in the morning and 1 tablet at noon and 1 tablet before bedtime. 7/23/22   Sridhar Hidalgo MD   QUEtiapine (SEROQUEL) 25 MG tablet Take 1 tablet by mouth in the morning and 1 tablet before bedtime. 7/23/22   Sridhar Hidalgo MD   sucralfate (CARAFATE) 1 GM tablet Take 1 tablet by mouth in the morning and 1 tablet at noon and 1 tablet in the evening and 1 tablet before bedtime.  7/20/22   Leyla Gaines DO   divalproex (DEPAKOTE) 250 MG DR tablet Take 250 mg by mouth 2 times daily  7/23/22  Historical Provider, MD   ferrous sulfate (IRON 325) 325 (65 Fe) MG tablet Take 325 mg by mouth daily (with breakfast)  Patient not taking: Reported on 7/7/2022 7/23/22  Historical Provider, MD   aspirin EC 81 MG EC tablet Take 1 tablet by mouth daily 5/17/22   Steffi Art MD   montelukast (SINGULAIR) 10 MG tablet Take 10 mg by mouth nightly    Historical Provider, MD       Current Medications:    Current Facility-Administered Medications: [COMPLETED] dilTIAZem injection 10 mg, 10 mg, IntraVENous, Once **FOLLOWED BY** dilTIAZem 125 mg in dextrose 5 % 125 mL infusion, 2.5-15 mg/hr, IntraVENous, Continuous  amiodarone (CORDARONE) tablet 200 mg, 200 mg, Oral, Daily  aspirin EC tablet 81 mg, 81 mg, Oral, Daily  apixaban (ELIQUIS) tablet 5 mg, 5 mg, Oral, BID  rOPINIRole (REQUIP) tablet 1 mg, 1 mg, Oral, TID  sucralfate (CARAFATE) tablet 1 g, 1 g, Oral, 4x Daily  pantoprazole (PROTONIX) tablet 40 mg, 40 mg, Oral, QAM  montelukast (SINGULAIR) tablet 10 mg, 10 mg, Oral, Nightly  mirtazapine (REMERON) tablet 15 mg, 15 mg, Oral, QHS PRN  miconazole (MICOTIN) 2 % powder 1 each, 1 each, Topical, BID  metoprolol tartrate (LOPRESSOR) tablet 12.5 mg, 12.5 mg, Oral, BID  LORazepam (ATIVAN) tablet 0.5 mg, 0.5 mg, Oral, Q12H PRN  ipratropium-albuterol (DUONEB) nebulizer solution 3 mL, 1 vial, Inhalation, 4x Daily  insulin glargine (LANTUS) injection vial 5 Units, 5 Units, SubCUTAneous, Nightly  carbidopa-levodopa (SINEMET CR)  MG per extended release tablet 2 tablet, 2 tablet, Oral, TID  budesonide (PULMICORT) nebulizer suspension 500 mcg, 0.5 mg, Nebulization, BID  benzoin compound solution 1 Applicatorful (Patient Supplied), 1 Applicatorful, Topical, Nightly  atorvastatin (LIPITOR) tablet 20 mg, 20 mg, Oral, Nightly  glucose chewable tablet 16 g, 4 tablet, Oral, PRN  dextrose bolus 10% 125 mL, 125 mL, IntraVENous, PRN **OR** dextrose bolus 10% 250 mL, 250 mL, IntraVENous, PRN  glucagon (rDNA) injection 1 mg, 1 mg, SubCUTAneous, PRN  dextrose 10 % infusion, , IntraVENous, Continuous PRN  insulin lispro (HUMALOG) injection vial 0-8 Units, 0-8 Units, SubCUTAneous, TID WC  insulin lispro (HUMALOG) injection vial 0-4 Units, 0-4 Units, SubCUTAneous, Nightly  sodium chloride flush 0.9 % injection 5-40 mL, 5-40 mL, IntraVENous, 2 times per day  sodium chloride flush 0.9 % injection 5-40 mL, 5-40 mL, IntraVENous, PRN  0.9 % sodium chloride infusion, , IntraVENous, PRN  polyethylene glycol (GLYCOLAX) packet 17 g, 17 g, Oral, Daily PRN  acetaminophen (TYLENOL) tablet 650 mg, 650 mg, Oral, Q6H PRN **OR** acetaminophen (TYLENOL) suppository 650 mg, 650 mg, Rectal, Q6H PRN  melatonin disintegrating tablet 5 mg, 5 mg, Oral, Nightly    Allergies:  Ciprofloxacin and Codeine    Social History: There is no history of tobacco, alcohol, or illicit drug use. Family History: per EMR: unable to obtain from her   Family History   Problem Relation Age of Onset    Cancer Mother         Lung Ca    Cancer Father         lung Ca    Diabetes Sister     Heart Disease Sister     Seizures Son     Other Brother         sepsis       Review of Systems: Unobtainable    Physical Exam:   /60   Pulse (!) 107   Temp 98 °F (36.7 °C) (Oral)   Resp 22   Ht 5' (1.524 m)   Wt 253 lb 6 oz (114.9 kg)   SpO2 96%   BMI 49.48 kg/m²   Physical exam deferred to reduce staff exposure and conserve PPE.    When viewed from the door way she appeared ill but in no acute distress. Telemetry: AF, ventricular rates 80s to  low 100s  ECG 8/24/2022: AF, 67 bpm with left axis deviation, right BBB      Intake/Output Summary (Last 24 hours) at 8/25/2022 0855  Last data filed at 8/25/2022 0850  Gross per 24 hour   Intake 120 ml   Output 300 ml   Net -180 ml       Labs:   CBC:   Recent Labs     08/24/22  0554 08/24/22  1227   WBC 8.0 8.1   HGB 9.2* 9.5*   HCT 33.3* 33.6*    198     BMP:   Recent Labs     08/24/22  0554 08/24/22  1227    140   K 5.5* 5.1*   CO2 38* 38*   BUN 31* 32*   CREATININE 1.3* 1.2*   LABGLOM 40 44   CALCIUM 9.3 9.3       HgA1c:   Lab Results   Component Value Date    LABA1C 6.1 (H) 08/24/2022       proBNP:   Recent Labs     08/24/22  0554   PROBNP 9,307*       FASTING LIPID PANEL:  Lab Results   Component Value Date/Time    CHOL 95 05/12/2022 01:44 AM    HDL 38 05/12/2022 01:44 AM    LDLCALC 38 05/12/2022 01:44 AM    TRIG 93 05/12/2022 01:44 AM     LIVER PROFILE:  Recent Labs     08/24/22  0554 08/24/22  1227   AST 11 8   ALT <5 <5   LABALBU 4.0 4.0     CXR 8/24/2022:  Impression:     1. Cardiomegaly with findings of CHF   2. Moderate size right pleural effusion   3. Trace left pleural effusion      Assessment:     Likely persistent atrial fibrillation   Started on amiodarone 7/2022  On Eliquis    2. Hypoxic respiratory failure with recurrent right pleural effusion   Prior history of chest tube placement in 7/2022 for right effusion     3. Chronic HFpEF with RV dysfunction and evidence of pulmonary hypertension     4. Altered mental status     5. SHANTANU on CKD    6. Morbid Obesity     7. SERENA    8. Insulin requiring type II DM    9. Hyperlipidemia    10. Chronic anemia      Treatment Plan:     Continue Cardizem drip (with chronic beta blocker and amiodarone) until respiratory status improves     2. Continue Eliquis      3.     No need for diuresis at this time as she does not appear to be in acute heart failure      4. Monitor BMP     5. Rest as per primary service and other consultants. The above assessment and treatment plan was made in collaboration with Dr. Alexandr Hollins and further recommendations will follow by him. Electronically signed by RASHARD Betancur on 8/25/2022 at 8:55 AM      [id number] [facility] Cardiology Consult       Irma Butler    I have personally participated in a face-to-face and personally obtained history and performed physical exam on the date of service. I reviewed chart, vitals, labs and radiologic studies. I also participated in medical decision making with RASHARD Betancur on the date of service All of the assessments and recommendations are from me and I agree with all of the pertinent clinical information, assessment and treatment plan. I have reviewed and edited the note above based on my findings during my history, exam, and decision making. Please see my additional contributions to the history, physical exam, assessment, and recommendations below. HISTORY OF PRESENT ILLNESS:     Reviewed, as above      Past medical history:  Reviewed, as above. Past surgical history:  Reviewed, as above. Current medications:  Reviewed, as above    Allergies:  Reviewed, as above    Social history:  Reviewed, as above    Family medical history:  Reviewed, as above. REVIEW OF SYSTEMS:   Reviewed, as above. PHYSICAL EXAM:   CONSTITUTIONAL: Sleepy, no apparent distress, and appears stated age  EYES:  lids and lashes normal and pupils equal, round and reactive to light, anicteric sclerae  HEAD:  normocepalic, without obvious abnormality, atraumatic, pink, moist mucous membranes.   NECK:  Supple, symmetrical, trachea midline, no adenopathy, thyroid symmetric, not enlarged and no tenderness, skin normal  HEMATOLOGIC/LYMPHATICS:  no cervical lymphadenopathy and no supraclavicular lymphadenopathy  LUNGS:  No increased work of breathing, good air exchange, clear to auscultation bilaterally, no crackles or wheezing  CARDIOVASCULAR:  Normal apical impulse, irregularly irregular, normal S1 and S2, no S3 or S4, and no murmur noted and no JVD, no carotid bruit, no pedal edema, good carotid upstroke bilaterally. ABDOMEN:  Soft, nontender, no masses, no hepatomegaly or splenomegaly, BS+  CHEST: nontender to palpation, expands symmetrically  MUSCULOSKELETAL:  No clubbing no cyanosis. there is no redness, warmth, or swelling of the joints  NEUROLOGIC: Sleepy  SKIN:  no bruising or bleeding, normal skin color, texture, turgor and no redness, warmth, or swelling    /71   Pulse 79   Temp 98.9 °F (37.2 °C) (Oral)   Resp 18   Ht 5' (1.524 m)   Wt 251 lb 2 oz (113.9 kg)   SpO2 94%   BMI 49.04 kg/m²       I/O last 3 completed shifts: In: 12 [P.O.:960]  Out: 600 [Urine:600]  I/O this shift:  In: -   Out: 300 [Urine:300]      DATA:   I personally reviewed the admission EKG with the following interpretation: None admission EKG is available for review    ECHO: 7/7/2022,Summary   Technically difficult study - limited visualization. Micro-bubble contrast injected to enhance left ventricular visualization. Normal left ventricular size and systolic function. Ejection fraction is visually estimated at 70-75%. Indeterminate diastolic function. No regional wall motion abnormalities seen. Mild left ventricular concentric hypertrophy noted. Moderately dilated right ventricle with reduced function. Biatrial dilation. Mild tricuspid regurgitation. RVSP is at least 60 mmHg. Prominent pericardial fat pad. No definitive evidence of pericardial effusion. Stress Test: 1/10/2022,  ECG during the infusion did not change. 2.  The myocardial perfusion imaging was abnormal.   3.  The abnormality was a large sized, moderate fixed defect involving   the inferior and inferolateral wall suggestive prior infarct without   any reversibility.  There was also a small sized mild perfusion defect   involving the mid to distal anterior wall suggestive ischemia. 4.  Overall left ventricular systolic function was normal without   regional wall motion abnormalities. 5.  No transient ischemic dilatation. 6.  High risk general pharmacologic stress test.         Angiography: 1/11/2022,CONCLUSIONS:  1. Coronary artery disease:  A. Left main. No significant disease. B.  LAD. Heavily calcified proximal and mid vessel with 50% mid vessel  stenosis. 60% proximal stenosis of a moderate size first diagonal  branch. C.  LCX. 50% ostial narrowing of the AV groove branch in the mid vessel  which is a bifurcation lesion with a large marginal branch which itself  did not appear to have any significant disease. The AV groove branch of  the left circumflex continues to provide a posterior descending artery  branch and the posterolateral branch (codominant vessel). D.  RCA. Codominant vessel with no significant angiographic disease. 2.  Normal left ventricular size with hyperdynamic left ventricular  systolic function with an estimated ejection fraction of 75%. 3.  Systemic hypertension. 4.  Elevated left ventricular end-diastolic pressure.     Cardiology Labs: BMP:    Lab Results   Component Value Date/Time     08/26/2022 05:15 AM    K 4.9 08/26/2022 05:15 AM    CL 99 08/26/2022 05:15 AM    CO2 39 08/26/2022 05:15 AM    BUN 27 08/26/2022 05:15 AM     CMP:    Lab Results   Component Value Date/Time     08/26/2022 05:15 AM    K 4.9 08/26/2022 05:15 AM    CL 99 08/26/2022 05:15 AM    CO2 39 08/26/2022 05:15 AM    BUN 27 08/26/2022 05:15 AM    PROT 6.0 08/25/2022 12:00 PM     CBC:    Lab Results   Component Value Date/Time    WBC 9.0 08/25/2022 12:00 PM    RBC 3.20 08/25/2022 12:00 PM    HGB 9.0 08/25/2022 12:00 PM    HCT 31.5 08/25/2022 12:00 PM    MCV 98.4 08/25/2022 12:00 PM    RDW 16.1 08/25/2022 12:00 PM     08/25/2022 12:00 PM     PT/INR:  No results found for: PTINR  PT/INR Warfarin:  No components found for: PTPATWAR, PTINRWAR  PTT:    Lab Results   Component Value Date/Time    APTT 33.9 07/20/2022 04:54 AM     PTT Heparin:  No components found for: APTTHEP  Magnesium:    Lab Results   Component Value Date/Time    MG 2.1 07/27/2022 04:52 AM     TSH:    Lab Results   Component Value Date/Time    TSH 4.580 07/06/2022 02:41 AM     TROPONIN:  No components found for: TROP  BNP:  No results found for: BNP  FASTING LIPID PANEL:    Lab Results   Component Value Date/Time    CHOL 95 05/12/2022 01:44 AM    HDL 38 05/12/2022 01:44 AM    TRIG 93 05/12/2022 01:44 AM     IR GUIDED THORACENTESIS PLEURAL   Final Result   Successful ultrasound guided thoracentesis. XR CHEST 1 VIEW   Final Result   1. There is no right pneumothorax status post right thoracentesis   2. Right lung base atelectasis   3. The left lung is clear. XR CHEST PORTABLE   Final Result   1. Cardiomegaly with findings of CHF   2. Moderate size right pleural effusion   3. Trace left pleural effusion             I have personally reviewed the laboratory, cardiac diagnostic and radiographic testing as outlined above:    IMPRESSION:  1. Atrial fibrillation with RVR: Rate is better controlled on current treatment, will continue  2. Acute hypoxic respiratory failure   3. Chronic diastolic congestive heart failure: Compensated   4. Bilateral pleural effusion   5. CAD: Nonobstructive involving LAD and left circumflex artery  6. Mitral valve regurgitation:   7. Tricuspid valve regurgitation: Mild  8. Pulmonary hypertension: Moderate  9. Hypertension: Controlled  10. Hyperlipidemia  11. Type 2 diabetes mellitus  12. Tobacco abuse:     RECOMMENDATIONS:   Will continue current treatment  Ventilatory support as needed  Will benefit from thoracentesis  Patient metabolic panel and CBC in a.m.   Further cardiac recommendations will be forthcoming pending her clinical course and diagnostic test findings    I have reviewed my findings and recommendations with patient    Thank you for the consult  Electronically signed by Camacho Guerra MD on 8/26/2022 at 8:22 PM    All of the above was discussed with the patient  reviewing the above stated recommendations. And a total of >50% of that time involved face-to-face time providing counseling and or coordination of care with the other providers, reviewing records/tests, counseling/education of the patient, ordering medications/tests/procedures, coordinating care, and documenting clinical information in the EHR. I also discussed the differential diagnosis and all of the proposed management plans with the patient a. NOTE: This report was transcribed using voice recognition software.  Every effort was made to ensure accuracy; however, inadvertent computerized transcription errors may be present

## 2022-08-25 NOTE — PROGRESS NOTES
[facility] Progress Note    Admitting Date and Time: 8/24/2022 11:55 AM  Admit Dx: Atrial fibrillation with rapid ventricular response ([address] [I48.91]    Subjective:  Patient is being followed for Atrial fibrillation with rapid ventricular response ([address] [I48.91]     Pt's speech is very low and also very choppy. She also seems to have receptive aphasia and she does not understand what I am saying most of the time. She is also changing subjects with every sentence. She was trying to tell me that the tray table was broken then she said she broke something on it. And she told me to call her daughter and her daughter would tell me all about it. When she was asking me for something to drink but also for help with it. No events overnight. No fevers. Patient denies any pain. Per RN: Nothing to report    ROS: denies fever, chills, cp, sob, n/v, HA unless stated above.   However, patient is difficult to interview, difficult for her to answer simple yes/no questions     lidocaine  5 mL IntraDERmal Once    sodium chloride flush  5-40 mL IntraVENous 2 times per day    heparin flush  1 mL IntraVENous 2 times per day    benzoin compound   Topical Nightly    amiodarone  200 mg Oral Daily    aspirin EC  81 mg Oral Daily    apixaban  5 mg Oral BID    rOPINIRole  1 mg Oral TID    sucralfate  1 g Oral 4x Daily    pantoprazole  40 mg Oral QAM    montelukast  10 mg Oral Nightly    miconazole  1 each Topical BID    metoprolol tartrate  12.5 mg Oral BID    ipratropium-albuterol  1 vial Inhalation 4x Daily    insulin glargine  5 Units SubCUTAneous Nightly    carbidopa-levodopa  2 tablet Oral TID    budesonide  0.5 mg Nebulization BID    atorvastatin  20 mg Oral Nightly    insulin lispro  0-8 Units SubCUTAneous TID WC    insulin lispro  0-4 Units SubCUTAneous Nightly    sodium chloride flush  5-40 mL IntraVENous 2 times per day    melatonin  5 mg Oral Nightly     sodium chloride flush, 5-40 mL, PRN  sodium chloride, , PRN  heparin flush, 1 mL, PRN  mirtazapine, 15 mg, QHS PRN  LORazepam, 0.5 mg, Q12H PRN  glucose, 4 tablet, PRN  dextrose bolus, 125 mL, PRN   Or  dextrose bolus, 250 mL, PRN  glucagon (rDNA), 1 mg, PRN  dextrose, , Continuous PRN  sodium chloride flush, 5-40 mL, PRN  sodium chloride, , PRN  polyethylene glycol, 17 g, Daily PRN  acetaminophen, 650 mg, Q6H PRN   Or  acetaminophen, 650 mg, Q6H PRN         Objective:    /78   Pulse 75   Temp 98.4 °F (36.9 °C) (Oral)   Resp 16   Ht 5' (1.524 m)   Wt 253 lb 6 oz (114.9 kg)   SpO2 95%   BMI 49.48 kg/m²     General Appearance: alert and oriented to person, not to place or time, and in no acute distress, appears euvolemic, not overtly sick appearing, is chronically ill appearing  Skin: warm and dry, turgor, no rashes, no jaundice  Head: normocephalic and atraumatic  Eyes: pupils equal, round, and reactive to light, extraocular eye movements intact, conjunctivae normal  Neck: neck supple and non tender without mass, no JVD  Pulmonary/Chest: no wheezes, rales or rhonchi, no air movement on the right, no respiratory distress on 4 L  Cardiovascular: normal rate, normal S1 and S2 and no carotid bruits  Abdomen: soft, non-tender, non-distended, normal bowel sounds, no masses or organomegaly  Extremities: no cyanosis, no clubbing and no edema  Neurologic: no cranial nerve deficit and speech with receptive aphasia, tangential, stop sentences in the middle, difficult to answer simple yes/no questions, speech very low, voice somewhat hoarse        Recent Labs     08/24/22  0554 08/24/22  1227 08/25/22  1200    140 137   K 5.5* 5.1* 5.4*   CL 96* 96* 95*   CO2 38* 38* 38*   BUN 31* 32* 31*   CREATININE 1.3* 1.2* 1.1*   GLUCOSE 181* 180* 178*   CALCIUM 9.3 9.3 8.8       Recent Labs     08/24/22  0554 08/24/22  1227 08/25/22  1200   WBC 8.0 8.1 9.0   RBC 3.30* 3.34* 3.20*   HGB 9.2* 9.5* 9.0*   HCT 33.3* 33.6* 31.5*   .9* 100.6* 98.4   MCH 27.9 28.4 28.1   MCHC 27.6* 28.3* 28.6*   RDW 15.8* 15.9* 16.1*    198 249   MPV 11.3 11.1 11.0       Respiratory molecular panel negative. No influenza. No COVID. Radiology:   None new    Assessment:    Principal Problem:    Acute respiratory failure with hypoxia and hypercapnia (HCC)  Active Problems:    Encephalopathy acute    Pleural effusion on right    Acute confusion    Chronic diastolic heart failure (HCC)    Diabetes mellitus (HCC)    Atrial fibrillation with rapid ventricular response (HCC)    Coronary artery disease involving native coronary artery of native heart without angina pectoris  Resolved Problems:    * No resolved hospital problems. *      Plan:  1. Acute hypoxic and hypercapnic respiratory failure -- etiology of her altered mental status at [facility]. Pro-BNP elevated, but not as high as last admit where it was 37,428.  -Consult to pulmonology --I read his consult note today, I appreciate his help, he has ordered an ABG  -RT  -Duonebs every 4 hours  -Pulmicort neb BID     2. Right pleural effusion -- had a chest tube from 7/8 to 7/21 to drain the fluid, found to be a transudate, thought to be due to CHF. -consult to pulmonology  -Salt restriction  -Consider fluid restriction  -Intakes/outputs/daily weights     3. SHANTANU -- with hyperkalemia -- likely pre-renal, has been on Bumex started last month -- discharged with a BUN/creat of 14/1.0 on 7/27. Consider elevated BUN is GI bleed? No active bleeding seen. Is on Eliquis.   Likely due to dehydration on Bumex  -Hold Bumex for now  -Repeat BMP daily  -Intakes/outputs/daily weights  -pt's tachycardia improved with 1 liter of normal saline given in ER, had borderline hypotension at 99/60, BP has now normalized (despite being on Cardizem drip)  -Will give dose of Lokelma for elevated potassium today  -Trend BMP daily  -Consider nephrology consult  -Consider more IV fluid -- however, with right sided effusion, which is large, will not provide at this time.       4.  Hx of asthma -- no wheezing at this point  -RT  -Consult to pulmonology  -Duonebs  -Pulmicort respules     5. Rapid afib -- rate controlled now on Cardizem  -Consult to cardiology  -Titrate drip for HR less than 100  -Telemetry    6. Anemia with macrocytosis --likely multifactorial etiology, hemoglobin stable compared with prior. No active bleeding.  -Check B12 and folate levels  -Trend CBC     Code Status: TOTAL support  DVT prophylaxis: on Eliquis  Disp: likely back to prior ECF -- in about 4 days      NOTE: This report was transcribed using voice recognition software. Every effort was made to ensure accuracy; however, inadvertent computerized transcription errors may be present.   Electronically signed by Aly Wolff DO on 8/25/2022 at 6:12 PM

## 2022-08-25 NOTE — PLAN OF CARE
Problem: Discharge Planning  Goal: Discharge to home or other facility with appropriate resources  8/25/2022 1015 by Lyn Montgomery RN  Outcome: Progressing  8/25/2022 0230 by Howard Potts RN  Outcome: Progressing  Flowsheets (Taken 8/24/2022 2037)  Discharge to home or other facility with appropriate resources: Identify barriers to discharge with patient and caregiver     Problem: ABCDS Injury Assessment  Goal: Absence of physical injury  8/25/2022 1015 by Lyn Montgomery RN  Outcome: Progressing  Flowsheets (Taken 8/25/2022 0256 by Howard Potts RN)  Absence of Physical Injury: Implement safety measures based on patient assessment  8/25/2022 0230 by Howard Potts RN  Outcome: Progressing  Flowsheets (Taken 8/24/2022 2032)  Absence of Physical Injury: Implement safety measures based on patient assessment     Problem: Safety - Adult  Goal: Free from fall injury  8/25/2022 1015 by Lyn Montgomery RN  Outcome: Progressing  Flowsheets (Taken 8/25/2022 0256 by Howard Potts RN)  Free From Fall Injury: Instruct family/caregiver on patient safety  8/25/2022 0230 by Howard Potts RN  Outcome: Progressing     Problem: Skin/Tissue Integrity  Goal: Absence of new skin breakdown  Description: 1. Monitor for areas of redness and/or skin breakdown  2. Assess vascular access sites hourly  3. Every 4-6 hours minimum:  Change oxygen saturation probe site  4. Every 4-6 hours:  If on nasal continuous positive airway pressure, respiratory therapy assess nares and determine need for appliance change or resting period.   8/25/2022 1015 by Lyn Montgomery RN  Outcome: Progressing  8/25/2022 0230 by Howard Potts RN  Outcome: Progressing     Problem: Chronic Conditions and Co-morbidities  Goal: Patient's chronic conditions and co-morbidity symptoms are monitored and maintained or improved  Outcome: Progressing

## 2022-08-25 NOTE — PROCEDURES
SL power midline Placement 8/25/2022    Product number: [facility] [id number]   Lot Number: [id number]   Consult: limited access   Ultrasound: yes   Left Basilic vein:                Upper Arm Circumference: 27cm    Size: 4.5fr x 15cm    Exposed Length: 0    Internal Length: 15cm   Cut: 0   Vein Measurement: 0.5cm    Tolerated well  Brisk blood return  Flushed well and capped  RN notified    Deborah Armenta RN  8/25/2022  11:53 AM

## 2022-08-25 NOTE — PROGRESS NOTES
Pulled ativan for pt placed it in pudding before I entered the room and went to scan medication and noted that it wasn't time for the dose.  I disposed of the ativan and pharmacy notified

## 2022-08-25 NOTE — PROGRESS NOTES
Lab has been unable to get blood. Order obtained from Dr. Dian Kincaid for midline.  PICC team aware

## 2022-08-25 NOTE — CONSULTS
Pulmonary/Critical Care Consult Note    CHIEF COMPLAINT: Acute respiratory failure, morbid obesity, obstructive sleep apnea, large right-sided pleural effusion, severe cor pulmonale, probable diastolic dysfunction, recent hospitalization in ventilator requirement at [facility], confusion, atrial fibrillation    HISTORY OF PRESENT ILLNESS: The patient is a 43-year-old female who was recently in [facility] where she required thoracentesis and chest tube placement, intubation, mechanical ventilation, and was gradually weaned off after she was treated for underlying cor pulmonale and diastolic dysfunction and possible pneumonia. She now comes back to the emergency room complaining of confusion and altered mental status similar to her presentation in early July. At that time, she was also hypotensive and had a low blood pressure. Patient also has a history of psychiatric problems and is on medications for this. There is a history of a right mastectomy for breast cancer in 2006    ALLERGY:  Ciprofloxacin and Codeine    FAMILY HISTORY:  Family History   Problem Relation Age of Onset    Cancer Mother         Lung Ca    Cancer Father         lung Ca    Diabetes Sister     Heart Disease Sister     Seizures Son     Other Brother         sepsis       SOCIAL HISTORY:  Social History     Socioeconomic History    Marital status:       Spouse name: Not on file    Number of children: Not on file    Years of education: Not on file    Highest education level: Not on file   Occupational History    Not on file   Tobacco Use    Smoking status: Never    Smokeless tobacco: Never   Vaping Use    Vaping Use: Never used   Substance and Sexual Activity    Alcohol use: No    Drug use: No    Sexual activity: Never   Other Topics Concern    Not on file   Social History Narrative    Not on file     Social Determinants of Health     Financial Resource Strain: Not on file   Food Insecurity: Not on file Transportation Needs: Not on file   Physical Activity: Not on file   Stress: Not on file   Social Connections: Not on file   Intimate Partner Violence: Not on file   Housing Stability: Not on file       MEDICAL HISTORY:  Past Medical History:   Diagnosis Date    A-fib [facility])     Acute on chronic congestive heart failure (HCC)     Anxiety     Asthma     CAD (coronary artery disease) 01/21/2016    Cancer ([facility] [address]  breast ca 2006    right lumpectomy    Chronic kidney disease     nephrolithiasis    Depression     Diabetes mellitus (HCC)     H/O mammogram     Hx MRSA infection     toe infection january 2012    Hyperlipidemia     Hypertension     Lateral epicondylitis     SERENA on CPAP     [facility]     Tubal ligation status        MEDICATIONS:   lidocaine  5 mL IntraDERmal Once    sodium chloride flush  5-40 mL IntraVENous 2 times per day    heparin flush  1 mL IntraVENous 2 times per day    benzoin compound   Topical Nightly    amiodarone  200 mg Oral Daily    aspirin EC  81 mg Oral Daily    apixaban  5 mg Oral BID    rOPINIRole  1 mg Oral TID    sucralfate  1 g Oral 4x Daily    pantoprazole  40 mg Oral QAM    montelukast  10 mg Oral Nightly    miconazole  1 each Topical BID    metoprolol tartrate  12.5 mg Oral BID    ipratropium-albuterol  1 vial Inhalation 4x Daily    insulin glargine  5 Units SubCUTAneous Nightly    carbidopa-levodopa  2 tablet Oral TID    budesonide  0.5 mg Nebulization BID    atorvastatin  20 mg Oral Nightly    insulin lispro  0-8 Units SubCUTAneous TID WC    insulin lispro  0-4 Units SubCUTAneous Nightly    sodium chloride flush  5-40 mL IntraVENous 2 times per day    melatonin  5 mg Oral Nightly      sodium chloride      sodium chloride 75 mL/hr at 08/25/22 1409    dilTIAZem 5 mg/hr (08/25/22 0318)    dextrose      sodium chloride       sodium chloride flush, sodium chloride, heparin flush, mirtazapine, LORazepam, glucose, dextrose bolus **OR** dextrose bolus, glucagon (rDNA), dextrose, sodium chloride flush, sodium chloride, polyethylene glycol, acetaminophen **OR** acetaminophen    REVIEW OF SYSTEMS:  Constitutional: Denies fever, weight loss, night sweats, and fatigue  Skin: Denies pigmentation, dark lesions, and rashes   HEENT: Denies hearing loss, tinnitus, ear drainage, epistaxis, sore throat, and hoarseness. Cardiovascular: Denies palpitations, chest pain, and chest pressure. Respiratory: Denies cough, dyspnea at rest, hemoptysis, apnea, and choking. Gastrointestinal: Denies nausea, vomiting, poor appetite, diarrhea, heartburn or reflux  Genitourinary: Denies dysuria, frequency, urgency or hematuria  Musculoskeletal: Denies myalgias, muscle weakness, and bone pain  Neurological: Denies dizziness, vertigo, headache, and focal weakness  Psychological: Denies anxiety and depression  Endocrine: Denies heat intolerance and cold intolerance  Hematopoietic/Lymphatic: Denies bleeding problems and blood transfusions    PHYSICAL EXAM:  Vitals:    08/25/22 1419   BP:    Pulse: 75   Resp: 16   Temp:    SpO2: 95%        O2 Flow Rate (L/min): 4 L/min  O2 Device: Nasal cannula    Constitutional: Confused no fever chills, diaphoresis  Skin: Chronic venous stasis changes  HEENT: Small oropharyngeal opening  Neck: Large neck circumference  Cardiovascular: S1, S2 fairly regular.   No S3 or murmurs or rubs present  Respiratory: Early decreased breath sounds over right hemithorax with dullness to percussion consistent with a right pleural effusion  Gastrointestinal: Morbidly obese, soft, nontender  Genitourinary: No grossly bloody urine  Extremities: 2+ edema feet legs and ankles  Neurological: Confused, disoriented does move all extremities however  Psychological: Cannot evaluate properly at this time    LABS:  WBC   Date Value Ref Range Status   08/25/2022 9.0 4.5 - 11.5 E9/L Final   08/24/2022 8.1 4.5 - 11.5 E9/L Final   08/24/2022 8.0 4.5 - 11.5 E9/L Final     Hemoglobin   Date Value Ref Range Status   08/25/2022 9.0 (L) 11.5 - 15.5 g/dL Final   08/24/2022 9.5 (L) 11.5 - 15.5 g/dL Final   08/24/2022 9.2 (L) 11.5 - 15.5 g/dL Final     Hematocrit   Date Value Ref Range Status   08/25/2022 31.5 (L) 34.0 - 48.0 % Final   08/24/2022 33.6 (L) 34.0 - 48.0 % Final   08/24/2022 33.3 (L) 34.0 - 48.0 % Final     MCV   Date Value Ref Range Status   08/25/2022 98.4 80.0 - 99.9 fL Final   08/24/2022 100.6 (H) 80.0 - 99.9 fL Final   08/24/2022 100.9 (H) 80.0 - 99.9 fL Final     Platelets   Date Value Ref Range Status   08/25/2022 249 130 - 450 E9/L Final   08/24/2022 198 130 - 450 E9/L Final   08/24/2022 192 130 - 450 E9/L Final     Sodium   Date Value Ref Range Status   08/25/2022 137 132 - 146 mmol/L Final   08/24/2022 140 132 - 146 mmol/L Final   08/24/2022 140 132 - 146 mmol/L Final     Potassium   Date Value Ref Range Status   08/24/2022 5.1 (H) 3.5 - 5.0 mmol/L Final   08/24/2022 5.5 (H) 3.5 - 5.0 mmol/L Final   07/27/2022 3.8 3.5 - 5.0 mmol/L Final     Potassium reflex Magnesium   Date Value Ref Range Status   08/25/2022 5.4 (H) 3.5 - 5.0 mmol/L Final   07/06/2022 5.0 3.5 - 5.0 mmol/L Final   07/05/2022 5.5 (H) 3.5 - 5.0 mmol/L Final     Chloride   Date Value Ref Range Status   08/25/2022 95 (L) 98 - 107 mmol/L Final   08/24/2022 96 (L) 98 - 107 mmol/L Final   08/24/2022 96 (L) 98 - 107 mmol/L Final     CO2   Date Value Ref Range Status   08/25/2022 38 (H) 22 - 29 mmol/L Final   08/24/2022 38 (H) 22 - 29 mmol/L Final   08/24/2022 38 (H) 22 - 29 mmol/L Final     BUN   Date Value Ref Range Status   08/25/2022 31 (H) 6 - 23 mg/dL Final   08/24/2022 32 (H) 6 - 23 mg/dL Final   08/24/2022 31 (H) 6 - 23 mg/dL Final     Creatinine   Date Value Ref Range Status   08/25/2022 1.1 (H) 0.5 - 1.0 mg/dL Final   08/24/2022 1.2 (H) 0.5 - 1.0 mg/dL Final   08/24/2022 1.3 (H) 0.5 - 1.0 mg/dL Final     Glucose   Date Value Ref Range Status   08/25/2022 178 (H) 74 - 99 mg/dL Final   08/24/2022 180 (H) 74 - 99 mg/dL Final 08/24/2022 181 (H) 74 - 99 mg/dL Final     Calcium   Date Value Ref Range Status   08/25/2022 8.8 8.6 - 10.2 mg/dL Final   08/24/2022 9.3 8.6 - 10.2 mg/dL Final   08/24/2022 9.3 8.6 - 10.2 mg/dL Final     Total Protein   Date Value Ref Range Status   08/25/2022 6.0 (L) 6.4 - 8.3 g/dL Final   08/24/2022 6.7 6.4 - 8.3 g/dL Final   08/24/2022 6.4 6.4 - 8.3 g/dL Final     Albumin   Date Value Ref Range Status   08/25/2022 3.8 3.5 - 5.2 g/dL Final   08/24/2022 4.0 3.5 - 5.2 g/dL Final   08/24/2022 4.0 3.5 - 5.2 g/dL Final     Total Bilirubin   Date Value Ref Range Status   08/25/2022 0.6 0.0 - 1.2 mg/dL Final   08/24/2022 0.5 0.0 - 1.2 mg/dL Final   08/24/2022 0.5 0.0 - 1.2 mg/dL Final     Alkaline Phosphatase   Date Value Ref Range Status   08/25/2022 100 35 - 104 U/L Final   08/24/2022 105 (H) 35 - 104 U/L Final   08/24/2022 102 35 - 104 U/L Final     AST   Date Value Ref Range Status   08/25/2022 8 0 - 31 U/L Final   08/24/2022 8 0 - 31 U/L Final   08/24/2022 11 0 - 31 U/L Final     ALT   Date Value Ref Range Status   08/25/2022 <5 0 - 32 U/L Final   08/24/2022 <5 0 - 32 U/L Final   08/24/2022 <5 0 - 32 U/L Final     GFR Non-   Date Value Ref Range Status   08/25/2022 49 >=60 mL/min/1.73 Final     Comment:     Chronic Kidney Disease: less than 60 ml/min/1.73 sq.m. Kidney Failure: less than 15 ml/min/1.73 sq.m. Results valid for patients 18 years and older. 08/24/2022 44 >=60 mL/min/1.73 Final     Comment:     Chronic Kidney Disease: less than 60 ml/min/1.73 sq.m. Kidney Failure: less than 15 ml/min/1.73 sq.m. Results valid for patients 18 years and older. 08/24/2022 40 >=60 mL/min/1.73 Final     Comment:     Chronic Kidney Disease: less than 60 ml/min/1.73 sq.m. Kidney Failure: less than 15 ml/min/1.73 sq.m. Results valid for patients 18 years and older.        GFR    Date Value Ref Range Status   08/25/2022 59  Final   08/24/2022 53  Final 08/24/2022 49  Final     Magnesium   Date Value Ref Range Status   07/27/2022 2.1 1.6 - 2.6 mg/dL Final   07/26/2022 1.9 1.6 - 2.6 mg/dL Final   07/25/2022 2.0 1.6 - 2.6 mg/dL Final     Phosphorus   Date Value Ref Range Status   07/27/2022 3.9 2.5 - 4.5 mg/dL Final   07/26/2022 4.6 (H) 2.5 - 4.5 mg/dL Final   07/25/2022 3.9 2.5 - 4.5 mg/dL Final     No results for input(s): PH, PO2, PCO2, HCO3, BE, O2SAT in the last 72 hours. RADIOLOGY:  XR CHEST PORTABLE   Final Result   1. Cardiomegaly with findings of CHF   2. Moderate size right pleural effusion   3.  Trace left pleural effusion         IR GUIDED THORACENTESIS PLEURAL    (Results Pending)       IMPRESSION:  Large right pleural effusion probably secondary to diastolic dysfunction  Severe right heart failure with cor pulmonale as documented previously at [facility]  Atrial fibrillation now with controlled rate on a Cardizem drip  Probable sleep apnea not currently being treated  Diabetes mellitus type 2  Psychiatric disorder as yet not clear  Arteriosclerotic cardiovascular disease with previous right toe amputation  History of right mastectomy status post breast cancer      PLAN:  The patient will require a right-sided thoracentesis  requested for tomorrow  Arterial blood gases  BiPAP all night in naps during the day  Cardizem drip per cardiology group    Electronically signed by Alex Wood MD on 8/25/2022 at 4:59 PM

## 2022-08-25 NOTE — CARE COORDINATION
8/25/2022 1120 CM Note:  COVID Negative 8/25/2022. CM for transition of care needs at d/c. Pt resides at [address] [place] [address] and will return there. Per [facility] she is a long term resident and a bed hold, if skilled need at d/c they will submit for precert but won't have to wait for auth. Will need signed [place] and the Covid testing from today 8/25 will be sufficient unless hospitalized for extended period of time. CM will follow.   Fabio Homans RN

## 2022-08-26 ENCOUNTER — APPOINTMENT (OUTPATIENT)
Dept: INTERVENTIONAL RADIOLOGY/VASCULAR | Age: 72
DRG: 189 | End: 2022-08-26
Payer: MEDICARE

## 2022-08-26 ENCOUNTER — APPOINTMENT (OUTPATIENT)
Dept: GENERAL RADIOLOGY | Age: 72
DRG: 189 | End: 2022-08-26
Payer: MEDICARE

## 2022-08-26 PROBLEM — N18.30 CKD STAGE 3 SECONDARY TO DIABETES (HCC): Status: ACTIVE | Noted: 2022-08-26

## 2022-08-26 PROBLEM — Z79.01 CHRONIC ANTICOAGULATION: Status: ACTIVE | Noted: 2022-08-26

## 2022-08-26 PROBLEM — I27.20 PULMONARY HTN (HCC): Status: ACTIVE | Noted: 2022-08-26

## 2022-08-26 PROBLEM — E11.22 CKD STAGE 3 SECONDARY TO DIABETES (HCC): Status: ACTIVE | Noted: 2022-08-26

## 2022-08-26 PROBLEM — R65.20 PUERPERAL SEPSIS WITH ACUTE HYPOXIC RESPIRATORY FAILURE WITHOUT SEPTIC SHOCK (HCC): Status: ACTIVE | Noted: 2022-08-26

## 2022-08-26 PROBLEM — J96.01 PUERPERAL SEPSIS WITH ACUTE HYPOXIC RESPIRATORY FAILURE WITHOUT SEPTIC SHOCK (HCC): Status: ACTIVE | Noted: 2022-08-26

## 2022-08-26 LAB
ANION GAP SERPL CALCULATED.3IONS-SCNC: 2 MMOL/L (ref 7–16)
APPEARANCE FLUID: CLEAR
B.E.: 7.1 MMOL/L (ref -3–3)
BUN BLDV-MCNC: 27 MG/DL (ref 6–23)
CALCIUM SERPL-MCNC: 8.6 MG/DL (ref 8.6–10.2)
CELL COUNT FLUID TYPE: NORMAL
CHLORIDE BLD-SCNC: 99 MMOL/L (ref 98–107)
CO2: 39 MMOL/L (ref 22–29)
COHB: 0.4 % (ref 0–1.5)
COLOR FLUID: YELLOW
COMMENT: ABNORMAL
CREAT SERPL-MCNC: 1.1 MG/DL (ref 0.5–1)
CRITICAL: ABNORMAL
CRITICAL: ABNORMAL
DATE ANALYZED: ABNORMAL
DATE ANALYZED: ABNORMAL
DATE OF COLLECTION: ABNORMAL
DATE OF COLLECTION: ABNORMAL
FLUID TYPE: NORMAL
GFR AFRICAN AMERICAN: 59
GFR NON-AFRICAN AMERICAN: 49 ML/MIN/1.73
GLUCOSE BLD-MCNC: 130 MG/DL (ref 74–99)
GLUCOSE, FLUID: 189 MG/DL
HCO3: 36.5 MMOL/L (ref 22–26)
HHB: 8 % (ref 0–5)
LAB: ABNORMAL
LAB: ABNORMAL
LACTATE DEHYDROGENASE: 143 U/L (ref 135–214)
LD, FLUID: 49 U/L
Lab: ABNORMAL
METER GLUCOSE: 117 MG/DL (ref 74–99)
METER GLUCOSE: 137 MG/DL (ref 74–99)
METER GLUCOSE: 148 MG/DL (ref 74–99)
METER GLUCOSE: 172 MG/DL (ref 74–99)
METHB: 0.3 % (ref 0–1.5)
MODE: ABNORMAL
MONOCYTE, FLUID: 90 %
NEUTROPHIL, FLUID: 10 %
NUCLEATED CELLS FLUID: 154 /UL
O2 CONTENT: 13.7 ML/DL
O2 SATURATION: 91.9 % (ref 92–98.5)
O2HB: 91.3 % (ref 94–97)
OPERATOR ID: 2873
OPERATOR ID: ABNORMAL
PATIENT TEMP: 37 C
PCO2: 83.9 MMHG (ref 35–45)
PH BLOOD GAS: 7.26 (ref 7.35–7.45)
PH FLUID: ABNORMAL
PO2: 65.6 MMHG (ref 75–100)
POTASSIUM REFLEX MAGNESIUM: 4.9 MMOL/L (ref 3.5–5)
PROTEIN FLUID: 2 G/DL
RBC FLUID: <2000 /UL
SODIUM BLD-SCNC: 140 MMOL/L (ref 132–146)
SOURCE, BLOOD GAS: ABNORMAL
SOURCE, BLOOD GAS: ABNORMAL
THB: 10.6 G/DL (ref 11.5–16.5)
TIME ANALYZED: 1355
TIME ANALYZED: 1723

## 2022-08-26 PROCEDURE — 83986 ASSAY PH BODY FLUID NOS: CPT

## 2022-08-26 PROCEDURE — 0W993ZZ DRAINAGE OF RIGHT PLEURAL CAVITY, PERCUTANEOUS APPROACH: ICD-10-PCS | Performed by: RADIOLOGY

## 2022-08-26 PROCEDURE — 71045 X-RAY EXAM CHEST 1 VIEW: CPT

## 2022-08-26 PROCEDURE — 2700000000 HC OXYGEN THERAPY PER DAY

## 2022-08-26 PROCEDURE — 6370000000 HC RX 637 (ALT 250 FOR IP): Performed by: FAMILY MEDICINE

## 2022-08-26 PROCEDURE — 36415 COLL VENOUS BLD VENIPUNCTURE: CPT

## 2022-08-26 PROCEDURE — 82947 ASSAY GLUCOSE BLOOD QUANT: CPT

## 2022-08-26 PROCEDURE — 94640 AIRWAY INHALATION TREATMENT: CPT

## 2022-08-26 PROCEDURE — 32555 ASPIRATE PLEURA W/ IMAGING: CPT

## 2022-08-26 PROCEDURE — 89051 BODY FLUID CELL COUNT: CPT

## 2022-08-26 PROCEDURE — C1729 CATH, DRAINAGE: HCPCS

## 2022-08-26 PROCEDURE — 80048 BASIC METABOLIC PNL TOTAL CA: CPT

## 2022-08-26 PROCEDURE — 99232 SBSQ HOSP IP/OBS MODERATE 35: CPT | Performed by: FAMILY MEDICINE

## 2022-08-26 PROCEDURE — 87070 CULTURE OTHR SPECIMN AEROBIC: CPT

## 2022-08-26 PROCEDURE — 99232 SBSQ HOSP IP/OBS MODERATE 35: CPT | Performed by: INTERNAL MEDICINE

## 2022-08-26 PROCEDURE — 36600 WITHDRAWAL OF ARTERIAL BLOOD: CPT

## 2022-08-26 PROCEDURE — 2580000003 HC RX 258: Performed by: INTERNAL MEDICINE

## 2022-08-26 PROCEDURE — 2060000000 HC ICU INTERMEDIATE R&B

## 2022-08-26 PROCEDURE — 87205 SMEAR GRAM STAIN: CPT

## 2022-08-26 PROCEDURE — 88305 TISSUE EXAM BY PATHOLOGIST: CPT

## 2022-08-26 PROCEDURE — 94660 CPAP INITIATION&MGMT: CPT

## 2022-08-26 PROCEDURE — 82962 GLUCOSE BLOOD TEST: CPT

## 2022-08-26 PROCEDURE — 82805 BLOOD GASES W/O2 SATURATION: CPT

## 2022-08-26 PROCEDURE — 84157 ASSAY OF PROTEIN OTHER: CPT

## 2022-08-26 PROCEDURE — 83615 LACTATE (LD) (LDH) ENZYME: CPT

## 2022-08-26 PROCEDURE — 88112 CYTOPATH CELL ENHANCE TECH: CPT

## 2022-08-26 PROCEDURE — 6360000002 HC RX W HCPCS: Performed by: FAMILY MEDICINE

## 2022-08-26 RX ADMIN — CARBIDOPA AND LEVODOPA 2 TABLET: 25; 100 TABLET, EXTENDED RELEASE ORAL at 21:24

## 2022-08-26 RX ADMIN — Medication 5 MG: at 21:24

## 2022-08-26 RX ADMIN — ATORVASTATIN CALCIUM 20 MG: 20 TABLET, FILM COATED ORAL at 21:24

## 2022-08-26 RX ADMIN — METOPROLOL TARTRATE 12.5 MG: 25 TABLET, FILM COATED ORAL at 08:31

## 2022-08-26 RX ADMIN — CARBIDOPA AND LEVODOPA 2 TABLET: 25; 100 TABLET, EXTENDED RELEASE ORAL at 14:15

## 2022-08-26 RX ADMIN — IPRATROPIUM BROMIDE AND ALBUTEROL SULFATE 3 ML: .5; 2.5 SOLUTION RESPIRATORY (INHALATION) at 10:09

## 2022-08-26 RX ADMIN — SUCRALFATE 1 G: 1 TABLET ORAL at 08:32

## 2022-08-26 RX ADMIN — ROPINIROLE HYDROCHLORIDE 1 MG: 1 TABLET, FILM COATED ORAL at 21:24

## 2022-08-26 RX ADMIN — BUDESONIDE 500 MCG: 0.5 SUSPENSION RESPIRATORY (INHALATION) at 04:56

## 2022-08-26 RX ADMIN — INSULIN GLARGINE 10 UNITS: 100 INJECTION, SOLUTION SUBCUTANEOUS at 21:35

## 2022-08-26 RX ADMIN — MONTELUKAST 10 MG: 10 TABLET, FILM COATED ORAL at 21:24

## 2022-08-26 RX ADMIN — SUCRALFATE 1 G: 1 TABLET ORAL at 14:15

## 2022-08-26 RX ADMIN — LORAZEPAM 0.5 MG: 0.5 TABLET ORAL at 04:49

## 2022-08-26 RX ADMIN — CARBIDOPA AND LEVODOPA 2 TABLET: 25; 100 TABLET, EXTENDED RELEASE ORAL at 08:32

## 2022-08-26 RX ADMIN — INSULIN LISPRO 3 UNITS: 100 INJECTION, SOLUTION INTRAVENOUS; SUBCUTANEOUS at 12:19

## 2022-08-26 RX ADMIN — ANTI-FUNGAL POWDER MICONAZOLE NITRATE TALC FREE 1 EACH: 1.42 POWDER TOPICAL at 08:33

## 2022-08-26 RX ADMIN — ASPIRIN 81 MG: 81 TABLET, COATED ORAL at 08:31

## 2022-08-26 RX ADMIN — SODIUM CHLORIDE: 9 INJECTION, SOLUTION INTRAVENOUS at 01:19

## 2022-08-26 RX ADMIN — APIXABAN 5 MG: 5 TABLET, FILM COATED ORAL at 21:23

## 2022-08-26 RX ADMIN — ROPINIROLE HYDROCHLORIDE 1 MG: 1 TABLET, FILM COATED ORAL at 14:15

## 2022-08-26 RX ADMIN — ANTI-FUNGAL POWDER MICONAZOLE NITRATE TALC FREE 1 EACH: 1.42 POWDER TOPICAL at 21:29

## 2022-08-26 RX ADMIN — IPRATROPIUM BROMIDE AND ALBUTEROL SULFATE 3 ML: .5; 2.5 SOLUTION RESPIRATORY (INHALATION) at 04:56

## 2022-08-26 RX ADMIN — ROPINIROLE HYDROCHLORIDE 1 MG: 1 TABLET, FILM COATED ORAL at 08:31

## 2022-08-26 RX ADMIN — AMIODARONE HYDROCHLORIDE 200 MG: 200 TABLET ORAL at 08:34

## 2022-08-26 RX ADMIN — PANTOPRAZOLE SODIUM 40 MG: 40 TABLET, DELAYED RELEASE ORAL at 08:31

## 2022-08-26 RX ADMIN — SUCRALFATE 1 G: 1 TABLET ORAL at 21:24

## 2022-08-26 RX ADMIN — METOPROLOL TARTRATE 12.5 MG: 25 TABLET, FILM COATED ORAL at 21:24

## 2022-08-26 ASSESSMENT — PAIN SCALES - GENERAL
PAINLEVEL_OUTOF10: 0

## 2022-08-26 NOTE — PROGRESS NOTES
Patient came down to Special Procedures for ultrasound guided right thoracentesis. Procedure was explained, questions were answered. 1327 Starting procedure /74  120  22  94% on 6LPM by nasal canula     1331 Ending procedure /77  124  24  96% on 6LPM by nasal canula    900 cc of hazy noe  color pleural fluid drained from patient, petrolatum dressing 2 x 2, petrolatum and tegaderm applied to right back. Patient tolerated procedure    Post procedure chest xray taken    Respiratory came took specimen for [address]    Specimen sent to lab. Nurse to nurse called spoke with Iris Nascimento, nurse notified of above information    Patient accompanied back to floor by this nurse due to cardizem drip.

## 2022-08-26 NOTE — PROGRESS NOTES
Patient is seen in follow-up for atrial fibrillation    Subjective:     Ms. Mccabe Resides in bed sleepy, opens eyes when called by name    ROS:  Unable to obtain, patient sleepy    Scheduled Meds:   lidocaine  5 mL IntraDERmal Once    sodium chloride flush  5-40 mL IntraVENous 2 times per day    heparin flush  1 mL IntraVENous 2 times per day    benzoin compound   Topical Nightly    insulin glargine  10 Units SubCUTAneous Nightly    insulin lispro  0-8 Units SubCUTAneous TID WC    insulin lispro  0-4 Units SubCUTAneous Nightly    insulin lispro  3 Units SubCUTAneous TID WC    amiodarone  200 mg Oral Daily    aspirin EC  81 mg Oral Daily    apixaban  5 mg Oral BID    rOPINIRole  1 mg Oral TID    sucralfate  1 g Oral 4x Daily    pantoprazole  40 mg Oral QAM    montelukast  10 mg Oral Nightly    miconazole  1 each Topical BID    metoprolol tartrate  12.5 mg Oral BID    ipratropium-albuterol  1 vial Inhalation 4x Daily    carbidopa-levodopa  2 tablet Oral TID    budesonide  0.5 mg Nebulization BID    atorvastatin  20 mg Oral Nightly    sodium chloride flush  5-40 mL IntraVENous 2 times per day    melatonin  5 mg Oral Nightly     Continuous Infusions:   sodium chloride      sodium chloride 75 mL/hr at 08/26/22 0119    dilTIAZem 5 mg/hr (08/25/22 0318)    dextrose      sodium chloride       PRN Meds:sodium chloride flush, sodium chloride, heparin flush, mirtazapine, LORazepam, glucose, dextrose bolus **OR** dextrose bolus, glucagon (rDNA), dextrose, sodium chloride flush, sodium chloride, polyethylene glycol, acetaminophen **OR** acetaminophen    I/O last 3 completed shifts:   In: 600 [P.O.:600]  Out: 900 [Urine:900]  I/O this shift:  In: 360 [P.O.:360]  Out: -       Objective:      Physical Exam:   /71   Pulse 79   Temp 98.9 °F (37.2 °C) (Oral)   Resp 18   Ht 5' (1.524 m)   Wt 251 lb 2 oz (113.9 kg)   SpO2 94%   BMI 49.04 kg/m²   CONSTITUTIONAL:  awake, alert, cooperative, no apparent distress, and appears stated age  HEAD:  normocepalic, without obvious abnormality, atraumatic  NECK:  Supple, symmetrical, trachea midline, no adenopathy, thyroid symmetric, not enlarged and no tenderness, skin normal  LUNGS:  No increased work of breathing, No accessory muscle use or intercostal retractions, good air exchange, clear to auscultation bilaterally, no crackles or wheezing  CARDIOVASCULAR:  Normal apical impulse, regular rate and rhythm, normal S1 and S2, no S3 or S4, and no murmur noted, no edema, no JVD, no carotid bruit. ABDOMEN:  Soft, nontender, no masses, no hepatomegaly, no splenomegaly, BS+  MUSCULOSKELETAL:  No clubbing no cyanosis. there is no redness, warmth, or swelling of the joints  full range of motion noted  NEUROLOGIC:  Alert, awake,oriented x3  SKIN:  no bruising or bleeding, normal skin color, texture, turgor and no redness, warmth, or swelling      Cardiographics  I personally reviewed the telemetry monitor strips with the following interpretation: Atrial fibrillation with controlled ventricular response, runs of A. fib with RVR    Echocardiogram: 7/7/2022,Summary   Technically difficult study - limited visualization. Micro-bubble contrast injected to enhance left ventricular visualization. Normal left ventricular size and systolic function. Ejection fraction is visually estimated at 70-75%. Indeterminate diastolic function. No regional wall motion abnormalities seen. Mild left ventricular concentric hypertrophy noted. Moderately dilated right ventricle with reduced function. Biatrial dilation. Mild tricuspid regurgitation. RVSP is at least 60 mmHg. Prominent pericardial fat pad. No definitive evidence of pericardial effusion. Imaging  XR CHEST 1 VIEW   Final Result   1. There is no right pneumothorax status post right thoracentesis   2. Right lung base atelectasis   3. The left lung is clear. XR CHEST PORTABLE   Final Result   1.  Cardiomegaly with findings of CHF 2. Moderate size right pleural effusion   3. Trace left pleural effusion         IR GUIDED THORACENTESIS PLEURAL    (Results Pending)       Lab Review   Lab Results   Component Value Date/Time     08/26/2022 05:15 AM    K 4.9 08/26/2022 05:15 AM    CL 99 08/26/2022 05:15 AM    CO2 39 08/26/2022 05:15 AM    BUN 27 08/26/2022 05:15 AM    CREATININE 1.1 08/26/2022 05:15 AM    GLUCOSE 130 08/26/2022 05:15 AM    CALCIUM 8.6 08/26/2022 05:15 AM     Lab Results   Component Value Date/Time    WBC 9.0 08/25/2022 12:00 PM    HGB 9.0 08/25/2022 12:00 PM    HCT 31.5 08/25/2022 12:00 PM    MCV 98.4 08/25/2022 12:00 PM     08/25/2022 12:00 PM     I have personally reviewed the laboratory, cardiac diagnostic and radiographic testing as outlined above:    Assessment:     1. Atrial fibrillation with RVR: Rate is better controlled on current treatment, will continue  2. Acute hypoxic respiratory failure: Suspect more pulmonary than cardiac  3. Chronic diastolic congestive heart failure: Compensated   4. Bilateral pleural effusion   5. CAD: Nonobstructive involving LAD and left circumflex artery  6. Mitral valve regurgitation:   7. Tricuspid valve regurgitation: Mild  8. Pulmonary hypertension: Moderate  9. Hypertension: Controlled  10. Hyperlipidemia  11. Type 2 diabetes mellitus  12. Lethargy      Recommendations:     1. Continue current treatment  2. Basic metabolic panel and CBC number  3. Further cardiac recommendations will be forthcoming pending her clinical course and diagnostic test findings    No family at bedside  Electronically signed by Hyacinth Moraes MD on 8/26/2022 at 3:30 PM  NOTE: This report was transcribed using voice recognition software.  Every effort was made to ensure accuracy; however, inadvertent computerized transcription errors may be present

## 2022-08-26 NOTE — CARE COORDINATION
8/26/2022 0940 CM Note: COVID Negative 8/25/2022. CM for transition of care needs at d/c. Pt resides at [address] [place] [address] and will return there. Per [facility] she is a long term resident and a bed hold, if skilled need at d/c they will submit for precert but won't have to wait for auth. Will need signed [place] and the Covid testing from today 8/25 will be sufficient unless hospitalized for extended period of time. CM will follow.   Eleno Hernandez RN

## 2022-08-26 NOTE — PROGRESS NOTES
Pulmonary/Critical Care Progress Note    We are following patient for acute respiratory failure, morbid obesity, obstructive sleep apnea, large right-sided pleural effusion status postthoracentesis today for 960 cc of transudative fluid, likely diastolic dysfunction, atrial fibrillation    SUBJECTIVE:  Remains nonverbal.  I cannot arouse her at this time. I wonder if she is hypercapnic and acidotic. We will obtain stat arterial blood gases. She may require BiPAP urgently placed back on her face.     MEDICATIONS:   lidocaine  5 mL IntraDERmal Once    sodium chloride flush  5-40 mL IntraVENous 2 times per day    heparin flush  1 mL IntraVENous 2 times per day    benzoin compound   Topical Nightly    insulin glargine  10 Units SubCUTAneous Nightly    insulin lispro  0-8 Units SubCUTAneous TID WC    insulin lispro  0-4 Units SubCUTAneous Nightly    insulin lispro  3 Units SubCUTAneous TID WC    amiodarone  200 mg Oral Daily    aspirin EC  81 mg Oral Daily    apixaban  5 mg Oral BID    rOPINIRole  1 mg Oral TID    sucralfate  1 g Oral 4x Daily    pantoprazole  40 mg Oral QAM    montelukast  10 mg Oral Nightly    miconazole  1 each Topical BID    metoprolol tartrate  12.5 mg Oral BID    ipratropium-albuterol  1 vial Inhalation 4x Daily    carbidopa-levodopa  2 tablet Oral TID    budesonide  0.5 mg Nebulization BID    atorvastatin  20 mg Oral Nightly    sodium chloride flush  5-40 mL IntraVENous 2 times per day    melatonin  5 mg Oral Nightly      sodium chloride      sodium chloride 75 mL/hr at 08/26/22 0119    dilTIAZem 5 mg/hr (08/25/22 0318)    dextrose      sodium chloride       sodium chloride flush, sodium chloride, heparin flush, mirtazapine, LORazepam, glucose, dextrose bolus **OR** dextrose bolus, glucagon (rDNA), dextrose, sodium chloride flush, sodium chloride, polyethylene glycol, acetaminophen **OR** acetaminophen      REVIEW OF SYSTEMS:  Not responsive at this time so cannot give any answers    OBJECTIVE:  Vitals:    08/26/22 1345   BP: 113/77   Pulse: (!) 124   Resp: 24   Temp: 97.9 °F (36.6 °C)   SpO2: 96%     FiO2 : 40 %  O2 Flow Rate (L/min): 6 L/min  O2 Device: Nasal cannula    PHYSICAL EXAM:  Constitutional: No fever, chills, diaphoresis  Skin: No skin rash, no skin breakdown  HEENT: Unremarkable  Neck: JVD, lymphadenopathy, thyromegaly  Cardiovascular: S1, S2 irregular. No S3 or rubs present  Respiratory: Improved breath sounds right hemithorax  Gastrointestinal: Soft, morbidly obese, nontender  Genitourinary: CVA tenderness  Extremities: 1-2+ edema feet legs and ankles  Neurological: Poorly responsive.   I am concerned that she may be markedly hypercapnic and acidotic given that she is on 6 L with a saturation of 96%  Psychological: Not examined    LABS:  WBC   Date Value Ref Range Status   08/25/2022 9.0 4.5 - 11.5 E9/L Final   08/24/2022 8.1 4.5 - 11.5 E9/L Final   08/24/2022 8.0 4.5 - 11.5 E9/L Final     Hemoglobin   Date Value Ref Range Status   08/25/2022 9.0 (L) 11.5 - 15.5 g/dL Final   08/24/2022 9.5 (L) 11.5 - 15.5 g/dL Final   08/24/2022 9.2 (L) 11.5 - 15.5 g/dL Final     Hematocrit   Date Value Ref Range Status   08/25/2022 31.5 (L) 34.0 - 48.0 % Final   08/24/2022 33.6 (L) 34.0 - 48.0 % Final   08/24/2022 33.3 (L) 34.0 - 48.0 % Final     MCV   Date Value Ref Range Status   08/25/2022 98.4 80.0 - 99.9 fL Final   08/24/2022 100.6 (H) 80.0 - 99.9 fL Final   08/24/2022 100.9 (H) 80.0 - 99.9 fL Final     Platelets   Date Value Ref Range Status   08/25/2022 249 130 - 450 E9/L Final   08/24/2022 198 130 - 450 E9/L Final   08/24/2022 192 130 - 450 E9/L Final     Sodium   Date Value Ref Range Status   08/26/2022 140 132 - 146 mmol/L Final   08/25/2022 137 132 - 146 mmol/L Final   08/24/2022 140 132 - 146 mmol/L Final     Potassium   Date Value Ref Range Status   08/24/2022 5.1 (H) 3.5 - 5.0 mmol/L Final   08/24/2022 5.5 (H) 3.5 - 5.0 mmol/L Final   07/27/2022 3.8 3.5 - 5.0 mmol/L Final Potassium reflex Magnesium   Date Value Ref Range Status   08/26/2022 4.9 3.5 - 5.0 mmol/L Final   08/25/2022 5.4 (H) 3.5 - 5.0 mmol/L Final   07/06/2022 5.0 3.5 - 5.0 mmol/L Final     Chloride   Date Value Ref Range Status   08/26/2022 99 98 - 107 mmol/L Final   08/25/2022 95 (L) 98 - 107 mmol/L Final   08/24/2022 96 (L) 98 - 107 mmol/L Final     CO2   Date Value Ref Range Status   08/26/2022 39 (H) 22 - 29 mmol/L Final   08/25/2022 38 (H) 22 - 29 mmol/L Final   08/24/2022 38 (H) 22 - 29 mmol/L Final     BUN   Date Value Ref Range Status   08/26/2022 27 (H) 6 - 23 mg/dL Final   08/25/2022 31 (H) 6 - 23 mg/dL Final   08/24/2022 32 (H) 6 - 23 mg/dL Final     Creatinine   Date Value Ref Range Status   08/26/2022 1.1 (H) 0.5 - 1.0 mg/dL Final   08/25/2022 1.1 (H) 0.5 - 1.0 mg/dL Final   08/24/2022 1.2 (H) 0.5 - 1.0 mg/dL Final     Glucose   Date Value Ref Range Status   08/26/2022 130 (H) 74 - 99 mg/dL Final   08/25/2022 178 (H) 74 - 99 mg/dL Final   08/24/2022 180 (H) 74 - 99 mg/dL Final     Calcium   Date Value Ref Range Status   08/26/2022 8.6 8.6 - 10.2 mg/dL Final   08/25/2022 8.8 8.6 - 10.2 mg/dL Final   08/24/2022 9.3 8.6 - 10.2 mg/dL Final     Total Protein   Date Value Ref Range Status   08/25/2022 6.0 (L) 6.4 - 8.3 g/dL Final   08/24/2022 6.7 6.4 - 8.3 g/dL Final   08/24/2022 6.4 6.4 - 8.3 g/dL Final     Albumin   Date Value Ref Range Status   08/25/2022 3.8 3.5 - 5.2 g/dL Final   08/24/2022 4.0 3.5 - 5.2 g/dL Final   08/24/2022 4.0 3.5 - 5.2 g/dL Final     Total Bilirubin   Date Value Ref Range Status   08/25/2022 0.6 0.0 - 1.2 mg/dL Final   08/24/2022 0.5 0.0 - 1.2 mg/dL Final   08/24/2022 0.5 0.0 - 1.2 mg/dL Final     Alkaline Phosphatase   Date Value Ref Range Status   08/25/2022 100 35 - 104 U/L Final   08/24/2022 105 (H) 35 - 104 U/L Final   08/24/2022 102 35 - 104 U/L Final     AST   Date Value Ref Range Status   08/25/2022 8 0 - 31 U/L Final   08/24/2022 8 0 - 31 U/L Final   08/24/2022 11 0 - 31 U/L Final     ALT   Date Value Ref Range Status   08/25/2022 <5 0 - 32 U/L Final   08/24/2022 <5 0 - 32 U/L Final   08/24/2022 <5 0 - 32 U/L Final     GFR Non-   Date Value Ref Range Status   08/26/2022 49 >=60 mL/min/1.73 Final     Comment:     Chronic Kidney Disease: less than 60 ml/min/1.73 sq.m. Kidney Failure: less than 15 ml/min/1.73 sq.m. Results valid for patients 18 years and older. 08/25/2022 49 >=60 mL/min/1.73 Final     Comment:     Chronic Kidney Disease: less than 60 ml/min/1.73 sq.m. Kidney Failure: less than 15 ml/min/1.73 sq.m. Results valid for patients 18 years and older. 08/24/2022 44 >=60 mL/min/1.73 Final     Comment:     Chronic Kidney Disease: less than 60 ml/min/1.73 sq.m. Kidney Failure: less than 15 ml/min/1.73 sq.m. Results valid for patients 18 years and older. GFR    Date Value Ref Range Status   08/26/2022 59  Final   08/25/2022 59  Final   08/24/2022 53  Final     Magnesium   Date Value Ref Range Status   07/27/2022 2.1 1.6 - 2.6 mg/dL Final   07/26/2022 1.9 1.6 - 2.6 mg/dL Final   07/25/2022 2.0 1.6 - 2.6 mg/dL Final     Phosphorus   Date Value Ref Range Status   07/27/2022 3.9 2.5 - 4.5 mg/dL Final   07/26/2022 4.6 (H) 2.5 - 4.5 mg/dL Final   07/25/2022 3.9 2.5 - 4.5 mg/dL Final     Recent Labs     08/25/22  1823   PH 7.256*   PO2 78.2   PCO2 87.4*   HCO3 38.0*   BE 8.5*   O2SAT 95.2       RADIOLOGY:  IR GUIDED THORACENTESIS PLEURAL   Final Result   Successful ultrasound guided thoracentesis. XR CHEST 1 VIEW   Final Result   1. There is no right pneumothorax status post right thoracentesis   2. Right lung base atelectasis   3. The left lung is clear. XR CHEST PORTABLE   Final Result   1. Cardiomegaly with findings of CHF   2. Moderate size right pleural effusion   3.  Trace left pleural effusion                 PROBLEM LIST:  Principal Problem:    Acute respiratory failure with hypoxia and hypercapnia (HCC)  Active Problems:    Encephalopathy acute    Pleural effusion on right    Acute confusion    Chronic diastolic heart failure (HCC)    Macrocytosis    Other specified anemias    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[facility] [address]    Diabetes mellitus (HCC)    Atrial fibrillation with rapid ventricular response (HCC)    Coronary artery disease involving native coronary artery of native heart without angina pectoris  Resolved Problems:    * No resolved hospital problems. *    IMPRESSION:  HFpEF  Morbid obesity  Cor pulmonale  Alveolar hypoventilation syndrome  Obstructive sleep apnea  Status post thoracentesis for transudative fluid on the right  Previous history of ventilatory requirements with intubation needed      PLAN:  I am concerned that the patient is poorly responsive. We will obtain stat arterial blood gases.   History of psychiatric disorder unclear at this time  Diabetes mellitus type 2  Status post right mastectomy  Cardizem drip per primary care/admitting physician      Electronically signed by Lucius Abel MD on 8/26/2022 at 4:31 PM

## 2022-08-26 NOTE — PROGRESS NOTES
[facility] Progress Note    Admitting Date and Time: 8/24/2022 11:55 AM  Admit Dx: Atrial fibrillation with rapid ventricular response ([facility] [address] [I48.91]    Subjective:  Patient is being followed for Atrial fibrillation with rapid ventricular response ([address] [I48.91]     Pt denies complaints. No pain. Last night -- difficult to arouse. Alert this morning.   Was on Bipap through the night    Per RN: nothing to report    ROS: denies fever, chills, cp, sob, n/v, HA unless stated above.      lidocaine  5 mL IntraDERmal Once    sodium chloride flush  5-40 mL IntraVENous 2 times per day    heparin flush  1 mL IntraVENous 2 times per day    benzoin compound   Topical Nightly    insulin glargine  10 Units SubCUTAneous Nightly    insulin lispro  0-8 Units SubCUTAneous TID WC    insulin lispro  0-4 Units SubCUTAneous Nightly    insulin lispro  3 Units SubCUTAneous TID WC    amiodarone  200 mg Oral Daily    aspirin EC  81 mg Oral Daily    apixaban  5 mg Oral BID    rOPINIRole  1 mg Oral TID    sucralfate  1 g Oral 4x Daily    pantoprazole  40 mg Oral QAM    montelukast  10 mg Oral Nightly    miconazole  1 each Topical BID    metoprolol tartrate  12.5 mg Oral BID    ipratropium-albuterol  1 vial Inhalation 4x Daily    carbidopa-levodopa  2 tablet Oral TID    budesonide  0.5 mg Nebulization BID    atorvastatin  20 mg Oral Nightly    sodium chloride flush  5-40 mL IntraVENous 2 times per day    melatonin  5 mg Oral Nightly     sodium chloride flush, 5-40 mL, PRN  sodium chloride, , PRN  heparin flush, 1 mL, PRN  mirtazapine, 15 mg, QHS PRN  LORazepam, 0.5 mg, Q12H PRN  glucose, 4 tablet, PRN  dextrose bolus, 125 mL, PRN   Or  dextrose bolus, 250 mL, PRN  glucagon (rDNA), 1 mg, PRN  dextrose, , Continuous PRN  sodium chloride flush, 5-40 mL, PRN  sodium chloride, , PRN  polyethylene glycol, 17 g, Daily PRN  acetaminophen, 650 mg, Q6H PRN   Or  acetaminophen, 650 mg, Q6H PRN         Objective:    /77 Pulse (!) 124   Temp 97.9 °F (36.6 °C) (Oral)   Resp 24   Ht 5' (1.524 m)   Wt 251 lb 2 oz (113.9 kg)   SpO2 96%   BMI 49.04 kg/m²   General Appearance: alert, not oriented to person, place, or time, and in no acute distress  Skin: warm and dry, good turgor, no rashes, no jaundice  Head: normocephalic and atraumatic  Eyes: pupils equal, round, and reactive to light, extraocular eye movements intact, conjunctivae normal  Neck: neck supple and non tender without mass   Pulmonary/Chest: clear to auscultation bilaterally- no wheezes, rales or rhonchi, normal air movement, no respiratory distress  Cardiovascular: normal rate, normal S1 and S2 and no carotid bruits  Abdomen: soft, non-tender, non-distended, normal bowel sounds, no masses or organomegaly  Extremities: no cyanosis, no clubbing and no edema  Neurologic: no cranial nerve deficit and speech normal        Recent Labs     08/24/22  1227 08/25/22  1200 08/26/22  0515    137 140   K 5.1* 5.4* 4.9   CL 96* 95* 99   CO2 38* 38* 39*   BUN 32* 31* 27*   CREATININE 1.2* 1.1* 1.1*   GLUCOSE 180* 178* 130*   CALCIUM 9.3 8.8 8.6       Recent Labs     08/24/22  0554 08/24/22  1227 08/25/22  1200   WBC 8.0 8.1 9.0   RBC 3.30* 3.34* 3.20*   HGB 9.2* 9.5* 9.0*   HCT 33.3* 33.6* 31.5*   .9* 100.6* 98.4   MCH 27.9 28.4 28.1   MCHC 27.6* 28.3* 28.6*   RDW 15.8* 15.9* 16.1*    198 249   MPV 11.3 11.1 11.0       Radiology:   CXR post thora -- no PTX, decreased pleural effusion    Assessment:    Principal Problem:    Acute respiratory failure with hypoxia and hypercapnia (HCC)  Active Problems:    Encephalopathy acute    Pleural effusion on right    Acute confusion    Chronic diastolic heart failure (HCC)    Macrocytosis    Other specified anemias    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HCC)    Diabetes mellitus (HCC)    Atrial fibrillation with rapid ventricular response (HCC)    Coronary artery disease involving native coronary artery of native heart without angina pectoris  Resolved Problems:    * No resolved hospital problems. *      Plan:  1. Acute hypoxic and hypercapnic respiratory failure -- etiology of her altered mental status at [facility]. Pro-BNP elevated, but not as high as last admit where it was 37,428.  -Consult to pulmonology --I read his consult note today, I appreciate his help, he has ordered an ABG  -RT  -Duonebs every 4 hours  -Pulmicort neb BID     2. Right pleural effusion -- had a chest tube from 7/8 to 7/21 to drain the fluid, found to be a transudate, thought to be due to CHF. -consult to pulmonology  -Salt restriction  -Consider fluid restriction  -Intakes/outputs/daily weights  -s/p thorascentesis today -- post-thora CXR looks good -- 900 cc removed     3. SHANTANU -- with hyperkalemia -- likely pre-renal, has been on Bumex started last month -- discharged with a BUN/creat of 14/1.0 on 7/27. Consider elevated BUN is GI bleed? No active bleeding seen. Is on Eliquis. Likely due to dehydration on Bumex  -Hold Bumex for now -- BUN coming down, hyperkalemia -- resolved  -Repeat BMP daily  -Intakes/outputs/daily weights  -pt's tachycardia improved with 1 liter of normal saline given in ER, had borderline hypotension at 99/60, BP has now normalized (despite being on Cardizem drip)  -Will give dose of Lokelma for elevated potassium today  -Trend BMP daily  -Consider nephrology consult  -Consider more IV fluid -- however, with right sided effusion, which is large, will not provide at this time. 4.  Hx of asthma -- no wheezing at this point  -RT  -Consult to pulmonology  -Duonebs  -Pulmicort respules     5. Rapid afib -- rate controlled now on Cardizem drip  -Consult to cardiology  -Titrate drip for HR less than 100  -Telemetry  -Cardiologist will change to oral meds once respiratory status improves/thorascentesis done     6. Anemia with macrocytosis --likely multifactorial etiology, hemoglobin stable compared with prior.   No active bleeding.  -Check B12 and folate levels  -Trend CBC     Code Status: TOTAL support  DVT prophylaxis: on Eliquis  Disp: likely back to prior ECF -- in about 4 days      NOTE: This report was transcribed using voice recognition software. Every effort was made to ensure accuracy; however, inadvertent computerized transcription errors may be present.     Electronically signed by Luly Freeman DO on 8/26/2022 at 3:46 PM

## 2022-08-26 NOTE — PROGRESS NOTES
ABG drawn x 1 from Right Radial. Patient had NormalAllen's Test. Patient was on 4 liters/min via nasal cannula  at time of puncture. Pressure held for 5. No bleeding or bruising noted at puncture site. Patient tolerated procedure well.       Performed by Lelon Spatz, RCP

## 2022-08-26 NOTE — PROGRESS NOTES
Physician Progress Note      PATIENT:               Mili William  CSN #:                  [id number]  :                       1950  ADMIT DATE:       2022 11:55 AM  DISCH DATE:  RESPONDING  PROVIDER #:        Trice Gant        QUERY TEXT:    Stage of Chronic Kidney Disease: Please provide further specificity, if known. Clinical indicators include: chronic kidney disease, cr, bun, creatinine,   pro-bnp, bun/creat, probnp, ckd, gfr  Options provided:  -- Chronic kidney disease stage 1  -- Chronic kidney disease stage 2  -- Chronic kidney disease stage 3  -- Chronic kidney disease stage 3a  -- Chronic kidney disease stage 3b  -- Chronic kidney disease stage 4  -- Chronic kidney disease stage 5  -- Chronic kidney disease stage 5, requiring dialysis  -- End stage renal disease  -- Other - I will add my own diagnosis  -- Disagree - Not applicable / Not valid  -- Disagree - Clinically Unable to determine / Unknown        PROVIDER RESPONSE TEXT:    The patient has chronic kidney disease stage 3a. QUERY TEXT:    Type of Encephalopathy: Please provide further specificity, if known. Clinical indicators include: fevers, chronic kidney disease, infection,   alcohol, encephalopathy, cute, septic, hypernatremia, altered mental status,   fever, influenza  Options provided:  -- Anoxic/hypoxic encephalopathy  -- Metabolic encephalopathy  -- Toxic encephalopathy  -- Hepatic encephalopathy  -- Hypertensive encephalopathy  -- Other - I will add my own diagnosis  -- Disagree - Not applicable / Not valid  -- Disagree - Clinically Unable to determine / Unknown        PROVIDER RESPONSE TEXT:    The patient has anoxic/hypoxic encephalopathy.       Electronically signed by:  Trice Gant 2022 3:11 PM

## 2022-08-26 NOTE — PROGRESS NOTES
Patient is quite a bit harder to arouse tonight. She was able to drink/take her medications this evening, but it was difficult for her to stay awake and swallow. Her vitals are with in normal limits and we will continue to monitor.

## 2022-08-27 LAB
ANION GAP SERPL CALCULATED.3IONS-SCNC: 6 MMOL/L (ref 7–16)
BUN BLDV-MCNC: 22 MG/DL (ref 6–23)
CALCIUM SERPL-MCNC: 8.1 MG/DL (ref 8.6–10.2)
CHLORIDE BLD-SCNC: 102 MMOL/L (ref 98–107)
CO2: 33 MMOL/L (ref 22–29)
CREAT SERPL-MCNC: 1 MG/DL (ref 0.5–1)
GFR AFRICAN AMERICAN: >60
GFR NON-AFRICAN AMERICAN: 54 ML/MIN/1.73
GLUCOSE BLD-MCNC: 139 MG/DL (ref 74–99)
METER GLUCOSE: 122 MG/DL (ref 74–99)
METER GLUCOSE: 126 MG/DL (ref 74–99)
METER GLUCOSE: 134 MG/DL (ref 74–99)
METER GLUCOSE: 282 MG/DL (ref 74–99)
POTASSIUM REFLEX MAGNESIUM: 4.3 MMOL/L (ref 3.5–5)
SODIUM BLD-SCNC: 141 MMOL/L (ref 132–146)

## 2022-08-27 PROCEDURE — 6360000002 HC RX W HCPCS: Performed by: FAMILY MEDICINE

## 2022-08-27 PROCEDURE — 2580000003 HC RX 258: Performed by: INTERNAL MEDICINE

## 2022-08-27 PROCEDURE — 94640 AIRWAY INHALATION TREATMENT: CPT

## 2022-08-27 PROCEDURE — 2500000003 HC RX 250 WO HCPCS: Performed by: INTERNAL MEDICINE

## 2022-08-27 PROCEDURE — 99233 SBSQ HOSP IP/OBS HIGH 50: CPT | Performed by: FAMILY MEDICINE

## 2022-08-27 PROCEDURE — 6370000000 HC RX 637 (ALT 250 FOR IP): Performed by: FAMILY MEDICINE

## 2022-08-27 PROCEDURE — 2060000000 HC ICU INTERMEDIATE R&B

## 2022-08-27 PROCEDURE — 2700000000 HC OXYGEN THERAPY PER DAY

## 2022-08-27 PROCEDURE — 2580000003 HC RX 258: Performed by: EMERGENCY MEDICINE

## 2022-08-27 PROCEDURE — 94660 CPAP INITIATION&MGMT: CPT

## 2022-08-27 PROCEDURE — 80048 BASIC METABOLIC PNL TOTAL CA: CPT

## 2022-08-27 PROCEDURE — 99233 SBSQ HOSP IP/OBS HIGH 50: CPT | Performed by: INTERNAL MEDICINE

## 2022-08-27 PROCEDURE — 82962 GLUCOSE BLOOD TEST: CPT

## 2022-08-27 PROCEDURE — 36415 COLL VENOUS BLD VENIPUNCTURE: CPT

## 2022-08-27 PROCEDURE — 6370000000 HC RX 637 (ALT 250 FOR IP): Performed by: INTERNAL MEDICINE

## 2022-08-27 PROCEDURE — 2580000003 HC RX 258: Performed by: FAMILY MEDICINE

## 2022-08-27 PROCEDURE — 2500000003 HC RX 250 WO HCPCS: Performed by: EMERGENCY MEDICINE

## 2022-08-27 RX ORDER — BUMETANIDE 0.25 MG/ML
1 INJECTION, SOLUTION INTRAMUSCULAR; INTRAVENOUS 2 TIMES DAILY
Status: DISCONTINUED | OUTPATIENT
Start: 2022-08-27 | End: 2022-08-31

## 2022-08-27 RX ORDER — DIGOXIN 125 MCG
125 TABLET ORAL DAILY
Status: DISCONTINUED | OUTPATIENT
Start: 2022-08-27 | End: 2022-09-08 | Stop reason: HOSPADM

## 2022-08-27 RX ADMIN — BUMETANIDE 1 MG: 0.25 INJECTION, SOLUTION INTRAMUSCULAR; INTRAVENOUS at 19:45

## 2022-08-27 RX ADMIN — SUCRALFATE 1 G: 1 TABLET ORAL at 11:50

## 2022-08-27 RX ADMIN — AMIODARONE HYDROCHLORIDE 200 MG: 200 TABLET ORAL at 08:37

## 2022-08-27 RX ADMIN — ROPINIROLE HYDROCHLORIDE 1 MG: 1 TABLET, FILM COATED ORAL at 13:31

## 2022-08-27 RX ADMIN — ATORVASTATIN CALCIUM 20 MG: 20 TABLET, FILM COATED ORAL at 19:47

## 2022-08-27 RX ADMIN — INSULIN LISPRO 4 UNITS: 100 INJECTION, SOLUTION INTRAVENOUS; SUBCUTANEOUS at 11:05

## 2022-08-27 RX ADMIN — ROPINIROLE HYDROCHLORIDE 1 MG: 1 TABLET, FILM COATED ORAL at 08:37

## 2022-08-27 RX ADMIN — MONTELUKAST 10 MG: 10 TABLET, FILM COATED ORAL at 19:44

## 2022-08-27 RX ADMIN — ANTI-FUNGAL POWDER MICONAZOLE NITRATE TALC FREE 1 EACH: 1.42 POWDER TOPICAL at 08:39

## 2022-08-27 RX ADMIN — Medication 10 ML: at 19:46

## 2022-08-27 RX ADMIN — LORAZEPAM 0.5 MG: 0.5 TABLET ORAL at 12:18

## 2022-08-27 RX ADMIN — SODIUM CHLORIDE: 9 INJECTION, SOLUTION INTRAVENOUS at 06:35

## 2022-08-27 RX ADMIN — APIXABAN 5 MG: 5 TABLET, FILM COATED ORAL at 19:44

## 2022-08-27 RX ADMIN — ASPIRIN 81 MG: 81 TABLET, COATED ORAL at 08:37

## 2022-08-27 RX ADMIN — DILTIAZEM HYDROCHLORIDE 5 MG/HR: 5 INJECTION, SOLUTION INTRAVENOUS at 08:36

## 2022-08-27 RX ADMIN — Medication 10 ML: at 08:40

## 2022-08-27 RX ADMIN — HEPARIN 100 UNITS: 100 SYRINGE at 19:50

## 2022-08-27 RX ADMIN — SUCRALFATE 1 G: 1 TABLET ORAL at 16:04

## 2022-08-27 RX ADMIN — INSULIN LISPRO 3 UNITS: 100 INJECTION, SOLUTION INTRAVENOUS; SUBCUTANEOUS at 08:40

## 2022-08-27 RX ADMIN — IPRATROPIUM BROMIDE AND ALBUTEROL SULFATE 3 ML: .5; 2.5 SOLUTION RESPIRATORY (INHALATION) at 14:08

## 2022-08-27 RX ADMIN — INSULIN LISPRO 3 UNITS: 100 INJECTION, SOLUTION INTRAVENOUS; SUBCUTANEOUS at 16:04

## 2022-08-27 RX ADMIN — SODIUM CHLORIDE: 9 INJECTION, SOLUTION INTRAVENOUS at 17:43

## 2022-08-27 RX ADMIN — BUMETANIDE 1 MG: 0.25 INJECTION, SOLUTION INTRAMUSCULAR; INTRAVENOUS at 12:18

## 2022-08-27 RX ADMIN — Medication 10 ML: at 19:49

## 2022-08-27 RX ADMIN — LORAZEPAM 0.5 MG: 0.5 TABLET ORAL at 00:55

## 2022-08-27 RX ADMIN — PANTOPRAZOLE SODIUM 40 MG: 40 TABLET, DELAYED RELEASE ORAL at 08:37

## 2022-08-27 RX ADMIN — CARBIDOPA AND LEVODOPA 2 TABLET: 25; 100 TABLET, EXTENDED RELEASE ORAL at 13:31

## 2022-08-27 RX ADMIN — IPRATROPIUM BROMIDE AND ALBUTEROL SULFATE 3 ML: .5; 2.5 SOLUTION RESPIRATORY (INHALATION) at 06:33

## 2022-08-27 RX ADMIN — ANTI-FUNGAL POWDER MICONAZOLE NITRATE TALC FREE 1 EACH: 1.42 POWDER TOPICAL at 19:47

## 2022-08-27 RX ADMIN — Medication 5 MG: at 19:46

## 2022-08-27 RX ADMIN — CARBIDOPA AND LEVODOPA 2 TABLET: 25; 100 TABLET, EXTENDED RELEASE ORAL at 08:38

## 2022-08-27 RX ADMIN — INSULIN GLARGINE 10 UNITS: 100 INJECTION, SOLUTION SUBCUTANEOUS at 19:50

## 2022-08-27 RX ADMIN — SUCRALFATE 1 G: 1 TABLET ORAL at 19:49

## 2022-08-27 RX ADMIN — CARBIDOPA AND LEVODOPA 2 TABLET: 25; 100 TABLET, EXTENDED RELEASE ORAL at 19:44

## 2022-08-27 RX ADMIN — HEPARIN 100 UNITS: 100 SYRINGE at 08:38

## 2022-08-27 RX ADMIN — IPRATROPIUM BROMIDE AND ALBUTEROL SULFATE 3 ML: .5; 2.5 SOLUTION RESPIRATORY (INHALATION) at 10:03

## 2022-08-27 RX ADMIN — Medication 10 ML: at 08:37

## 2022-08-27 RX ADMIN — IPRATROPIUM BROMIDE AND ALBUTEROL SULFATE 3 ML: .5; 2.5 SOLUTION RESPIRATORY (INHALATION) at 18:36

## 2022-08-27 RX ADMIN — DIGOXIN 125 MCG: 125 TABLET ORAL at 15:23

## 2022-08-27 RX ADMIN — APIXABAN 5 MG: 5 TABLET, FILM COATED ORAL at 08:37

## 2022-08-27 RX ADMIN — SUCRALFATE 1 G: 1 TABLET ORAL at 08:37

## 2022-08-27 RX ADMIN — BUDESONIDE 500 MCG: 0.5 SUSPENSION RESPIRATORY (INHALATION) at 18:36

## 2022-08-27 RX ADMIN — INSULIN LISPRO 3 UNITS: 100 INJECTION, SOLUTION INTRAVENOUS; SUBCUTANEOUS at 12:08

## 2022-08-27 RX ADMIN — ROPINIROLE HYDROCHLORIDE 1 MG: 1 TABLET, FILM COATED ORAL at 19:45

## 2022-08-27 RX ADMIN — METOPROLOL TARTRATE 12.5 MG: 25 TABLET, FILM COATED ORAL at 08:37

## 2022-08-27 RX ADMIN — BUDESONIDE 500 MCG: 0.5 SUSPENSION RESPIRATORY (INHALATION) at 06:33

## 2022-08-27 ASSESSMENT — PAIN SCALES - GENERAL
PAINLEVEL_OUTOF10: 0
PAINLEVEL_OUTOF10: 0

## 2022-08-27 NOTE — PROGRESS NOTES
Patient is seen in follow-up for atrial fibrillation    Subjective:     Ms. Abhijeet Rushs in bed sleepy, opens eyes when called by name. Appears to be in mild to moderate distress. ROS:  Denies any new complaints.,  Does complain of ongoing dyspnea at rest.    Scheduled Meds:   bumetanide  1 mg IntraVENous BID    lidocaine  5 mL IntraDERmal Once    sodium chloride flush  5-40 mL IntraVENous 2 times per day    heparin flush  1 mL IntraVENous 2 times per day    benzoin compound   Topical Nightly    insulin glargine  10 Units SubCUTAneous Nightly    insulin lispro  0-8 Units SubCUTAneous TID WC    insulin lispro  0-4 Units SubCUTAneous Nightly    insulin lispro  3 Units SubCUTAneous TID WC    amiodarone  200 mg Oral Daily    aspirin EC  81 mg Oral Daily    apixaban  5 mg Oral BID    rOPINIRole  1 mg Oral TID    sucralfate  1 g Oral 4x Daily    pantoprazole  40 mg Oral QAM    montelukast  10 mg Oral Nightly    miconazole  1 each Topical BID    metoprolol tartrate  12.5 mg Oral BID    ipratropium-albuterol  1 vial Inhalation 4x Daily    carbidopa-levodopa  2 tablet Oral TID    budesonide  0.5 mg Nebulization BID    atorvastatin  20 mg Oral Nightly    sodium chloride flush  5-40 mL IntraVENous 2 times per day    melatonin  5 mg Oral Nightly     Continuous Infusions:   sodium chloride      sodium chloride 75 mL/hr at 08/27/22 0737    dilTIAZem Stopped (08/27/22 1330)    dextrose      sodium chloride       PRN Meds:sodium chloride flush, sodium chloride, heparin flush, mirtazapine, LORazepam, glucose, dextrose bolus **OR** dextrose bolus, glucagon (rDNA), dextrose, sodium chloride flush, sodium chloride, polyethylene glycol, acetaminophen **OR** acetaminophen    I/O last 3 completed shifts: In: 600 [P.O.:600]  Out: 600 [Urine:600]  I/O this shift:   In: 480 [P.O.:480]  Out: -       Objective:      Physical Exam:   /68   Pulse 89   Temp 96.9 °F (36.1 °C) (Axillary)   Resp 20   Ht 5' (1.524 m)   Wt 243 lb (110.2 kg)   SpO2 97%   BMI 47.46 kg/m²   CONSTITUTIONAL:  awake, alert, cooperative, no apparent distress, and appears stated age  HEAD:  normocepalic, without obvious abnormality, atraumatic  NECK:  Supple, symmetrical, trachea midline, no adenopathy, thyroid symmetric, not enlarged and no tenderness, skin normal  LUNGS:  No increased work of breathing, No accessory muscle use or intercostal retractions, good air exchange, clear to auscultation bilaterally, no crackles or wheezing  CARDIOVASCULAR:  Normal apical impulse, regular rate and rhythm, normal S1 and S2, no S3 or S4, and no murmur noted, no edema, JVP elevated at 14 cm, no carotid bruit. ABDOMEN:  Soft, nontender, no masses, no hepatomegaly, no splenomegaly, BS+  MUSCULOSKELETAL:  No clubbing no cyanosis. there is no redness, warmth, or swelling of the joints  full range of motion noted  NEUROLOGIC:  Alert, awake,oriented x3  SKIN:  no bruising or bleeding, normal skin color, texture, turgor and no redness, warmth, or swelling      Cardiographics  I personally reviewed the telemetry monitor strips with the following interpretation: Atrial fibrillation with controlled ventricular response, runs of A. fib with RVR    Echocardiogram: 7/7/2022,Summary   Technically difficult study - limited visualization. Micro-bubble contrast injected to enhance left ventricular visualization. Normal left ventricular size and systolic function. Ejection fraction is visually estimated at 70-75%. Indeterminate diastolic function. No regional wall motion abnormalities seen. Mild left ventricular concentric hypertrophy noted. Moderately dilated right ventricle with reduced function. Biatrial dilation. Mild tricuspid regurgitation. RVSP is at least 60 mmHg. Prominent pericardial fat pad. No definitive evidence of pericardial effusion. Imaging  IR GUIDED THORACENTESIS PLEURAL   Final Result   Successful ultrasound guided thoracentesis.          XR CHEST 1 VIEW   Final Result   1. There is no right pneumothorax status post right thoracentesis   2. Right lung base atelectasis   3. The left lung is clear. XR CHEST PORTABLE   Final Result   1. Cardiomegaly with findings of CHF   2. Moderate size right pleural effusion   3. Trace left pleural effusion             Lab Review   Lab Results   Component Value Date/Time     08/27/2022 06:46 AM    K 4.3 08/27/2022 06:46 AM     08/27/2022 06:46 AM    CO2 33 08/27/2022 06:46 AM    BUN 22 08/27/2022 06:46 AM    CREATININE 1.0 08/27/2022 06:46 AM    GLUCOSE 139 08/27/2022 06:46 AM    CALCIUM 8.1 08/27/2022 06:46 AM     Lab Results   Component Value Date/Time    WBC 9.0 08/25/2022 12:00 PM    HGB 9.0 08/25/2022 12:00 PM    HCT 31.5 08/25/2022 12:00 PM    MCV 98.4 08/25/2022 12:00 PM     08/25/2022 12:00 PM     I have personally reviewed the laboratory, cardiac diagnostic and radiographic testing as outlined above:    Assessment:     1. Atrial fibrillation with RVR: Rate is better controlled on current treatment, will continue  2. Acute hypoxic respiratory failure: Combination of acute decompensated heart failure as well as COPD exacerbation  3. Chronic diastolic congestive heart failure: Decompensated  4. Bilateral pleural effusion   5. CAD: Nonobstructive involving LAD and left circumflex artery  6. Mitral valve regurgitation:   7. Tricuspid valve regurgitation: Mild  8. Pulmonary hypertension: Moderate  9. Hypertension: Controlled  10. Hyperlipidemia  11. Type 2 diabetes mellitus        Recommendations:     1. She appears hypervolemic on examination. BNP from 8/24/2022 was elevated at 9307. We will check repeat BNP tomorrow. I will start her on Lasix 40 IV twice daily. Monitor daily weights, renal function and urine output. 2.  She will continue with Eliquis 5 twice daily for anticoagulation. She is on Lopressor 12.5 mg twice daily.   She is currently on Cardizem drip which is running at 2.5. Wean as able. 3.  I will initiate digoxin 125 mcg daily. Check digoxin level in 3 days. 4.  She has had recurrent atrial fibrillation. She underwent cardioversion both in April and May of this year. She is still on amiodarone 200 mg daily. With her significant morbid obesity as well as ongoing pulmonary issues, I believe rhythm control is going to be difficult. 5.  She does have moderate pulmonary hypertension which is secondary to group 2 as well as group 3 causes. Continue treatment of underlying issues. No family at bedside  Electronically signed by Neo Hills MD on 8/27/2022 at 1:49 PM  NOTE: This report was transcribed using voice recognition software.  Every effort was made to ensure accuracy; however, inadvertent computerized transcription errors may be present

## 2022-08-27 NOTE — PROGRESS NOTES
[facility] Progress Note    Admitting Date and Time: 8/24/2022 11:55 AM  Admit Dx: Atrial fibrillation with rapid ventricular response ([facility] [address] [I48.91]    Subjective:  Patient is being followed for Atrial fibrillation with rapid ventricular response ([facility] [address] [I48.91]     Pt admits to pain at thorascentesis site on the right. No fevers. No events overnight. On bipap overnight. Per RN: yesterday reported that the pt is anxious on the bipap at times    ROS: denies fever, chills, cp, sob, n/v, HA unless stated above.       bumetanide  1 mg IntraVENous BID    digoxin  125 mcg Oral Daily    lidocaine  5 mL IntraDERmal Once    sodium chloride flush  5-40 mL IntraVENous 2 times per day    heparin flush  1 mL IntraVENous 2 times per day    benzoin compound   Topical Nightly    insulin glargine  10 Units SubCUTAneous Nightly    insulin lispro  0-8 Units SubCUTAneous TID WC    insulin lispro  0-4 Units SubCUTAneous Nightly    insulin lispro  3 Units SubCUTAneous TID WC    amiodarone  200 mg Oral Daily    aspirin EC  81 mg Oral Daily    apixaban  5 mg Oral BID    rOPINIRole  1 mg Oral TID    sucralfate  1 g Oral 4x Daily    pantoprazole  40 mg Oral QAM    montelukast  10 mg Oral Nightly    miconazole  1 each Topical BID    metoprolol tartrate  12.5 mg Oral BID    ipratropium-albuterol  1 vial Inhalation 4x Daily    carbidopa-levodopa  2 tablet Oral TID    budesonide  0.5 mg Nebulization BID    atorvastatin  20 mg Oral Nightly    sodium chloride flush  5-40 mL IntraVENous 2 times per day    melatonin  5 mg Oral Nightly     sodium chloride flush, 5-40 mL, PRN  sodium chloride, , PRN  heparin flush, 1 mL, PRN  mirtazapine, 15 mg, QHS PRN  LORazepam, 0.5 mg, Q12H PRN  glucose, 4 tablet, PRN  dextrose bolus, 125 mL, PRN   Or  dextrose bolus, 250 mL, PRN  glucagon (rDNA), 1 mg, PRN  dextrose, , Continuous PRN  sodium chloride flush, 5-40 mL, PRN  sodium chloride, , PRN  polyethylene glycol, 17 g, Daily PRN  acetaminophen, 650 mg, Q6H PRN   Or  acetaminophen, 650 mg, Q6H PRN         Objective:    /68   Pulse 89   Temp 96.9 °F (36.1 °C) (Axillary)   Resp 20   Ht 5' (1.524 m)   Wt 243 lb (110.2 kg)   SpO2 97%   BMI 47.46 kg/m²     General Appearance: alert and oriented to person, place, but not to time, and in no acute distress  Skin: warm and dry, good turgor, no rashes, no jaundice  Head: normocephalic and atraumatic  Eyes: pupils equal, round, and reactive to light, extraocular eye movements intact, conjunctivae normal  Neck: neck supple and non tender without mass   Pulmonary/Chest: clear to auscultation bilaterally- no wheezes, rales or rhonchi, normal air movement, no respiratory distress  Cardiovascular: normal rate, normal S1 and S2 and no carotid bruits  Abdomen: soft, non-tender, non-distended, normal bowel sounds, no masses or organomegaly  Extremities: no cyanosis, no clubbing and no edema  Neurologic: no cranial nerve deficit and speech normal        Recent Labs     08/25/22  1200 08/26/22  0515 08/27/22  0646    140 141   K 5.4* 4.9 4.3   CL 95* 99 102   CO2 38* 39* 33*   BUN 31* 27* 22   CREATININE 1.1* 1.1* 1.0   GLUCOSE 178* 130* 139*   CALCIUM 8.8 8.6 8.1*       Recent Labs     08/25/22  1200   WBC 9.0   RBC 3.20*   HGB 9.0*   HCT 31.5*   MCV 98.4   MCH 28.1   MCHC 28.6*   RDW 16.1*      MPV 11.0       Radiology:   None new    Assessment:    Principal Problem:    Acute respiratory failure with hypoxia and hypercapnia (HCC)  Active Problems:    Encephalopathy acute    Pleural effusion    Acute confusion    Chronic diastolic (congestive) heart failure (HCC)    Macrocytosis    Other specified anemias    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 No active bleeding.  -Check B12 and folate levels  -Trend CBC     Code Status: TOTAL support  DVT prophylaxis: on Eliquis  Disp: likely back to prior ECF -- in about 3 days      NOTE: This report was transcribed using voice recognition software. Every effort was made to ensure accuracy; however, inadvertent computerized transcription errors may be present.   Electronically signed by Young Ulrich DO on 8/27/2022 at 3:07 PM

## 2022-08-28 PROBLEM — I50.810 RVF (RIGHT VENTRICULAR FAILURE) (HCC): Status: ACTIVE | Noted: 2022-08-28

## 2022-08-28 LAB
AMMONIA: 51 UMOL/L (ref 11–51)
ANION GAP SERPL CALCULATED.3IONS-SCNC: 7 MMOL/L (ref 7–16)
BODY FLUID CULTURE, STERILE: NORMAL
BUN BLDV-MCNC: 18 MG/DL (ref 6–23)
CALCIUM SERPL-MCNC: 8.2 MG/DL (ref 8.6–10.2)
CHLORIDE BLD-SCNC: 101 MMOL/L (ref 98–107)
CO2: 35 MMOL/L (ref 22–29)
CREAT SERPL-MCNC: 1 MG/DL (ref 0.5–1)
GFR AFRICAN AMERICAN: >60
GFR NON-AFRICAN AMERICAN: 54 ML/MIN/1.73
GLUCOSE BLD-MCNC: 79 MG/DL (ref 74–99)
GRAM STAIN RESULT: NORMAL
METER GLUCOSE: 161 MG/DL (ref 74–99)
METER GLUCOSE: 228 MG/DL (ref 74–99)
METER GLUCOSE: 276 MG/DL (ref 74–99)
METER GLUCOSE: 81 MG/DL (ref 74–99)
POTASSIUM REFLEX MAGNESIUM: 4 MMOL/L (ref 3.5–5)
PRO-BNP: 6345 PG/ML (ref 0–125)
SODIUM BLD-SCNC: 143 MMOL/L (ref 132–146)

## 2022-08-28 PROCEDURE — 2700000000 HC OXYGEN THERAPY PER DAY

## 2022-08-28 PROCEDURE — 94640 AIRWAY INHALATION TREATMENT: CPT

## 2022-08-28 PROCEDURE — 2060000000 HC ICU INTERMEDIATE R&B

## 2022-08-28 PROCEDURE — 6360000002 HC RX W HCPCS: Performed by: FAMILY MEDICINE

## 2022-08-28 PROCEDURE — 94660 CPAP INITIATION&MGMT: CPT

## 2022-08-28 PROCEDURE — 6370000000 HC RX 637 (ALT 250 FOR IP): Performed by: FAMILY MEDICINE

## 2022-08-28 PROCEDURE — 82962 GLUCOSE BLOOD TEST: CPT

## 2022-08-28 PROCEDURE — 36415 COLL VENOUS BLD VENIPUNCTURE: CPT

## 2022-08-28 PROCEDURE — 2500000003 HC RX 250 WO HCPCS: Performed by: INTERNAL MEDICINE

## 2022-08-28 PROCEDURE — 6370000000 HC RX 637 (ALT 250 FOR IP): Performed by: INTERNAL MEDICINE

## 2022-08-28 PROCEDURE — 82140 ASSAY OF AMMONIA: CPT

## 2022-08-28 PROCEDURE — 99233 SBSQ HOSP IP/OBS HIGH 50: CPT | Performed by: INTERNAL MEDICINE

## 2022-08-28 PROCEDURE — 2580000003 HC RX 258: Performed by: INTERNAL MEDICINE

## 2022-08-28 PROCEDURE — 99233 SBSQ HOSP IP/OBS HIGH 50: CPT | Performed by: FAMILY MEDICINE

## 2022-08-28 PROCEDURE — 2580000003 HC RX 258: Performed by: FAMILY MEDICINE

## 2022-08-28 PROCEDURE — 80048 BASIC METABOLIC PNL TOTAL CA: CPT

## 2022-08-28 PROCEDURE — 83880 ASSAY OF NATRIURETIC PEPTIDE: CPT

## 2022-08-28 RX ORDER — PREDNISONE 20 MG/1
40 TABLET ORAL DAILY
Status: DISCONTINUED | OUTPATIENT
Start: 2022-08-29 | End: 2022-09-06

## 2022-08-28 RX ORDER — DILTIAZEM HYDROCHLORIDE 5 MG/ML
10 INJECTION INTRAVENOUS ONCE
Status: COMPLETED | OUTPATIENT
Start: 2022-08-28 | End: 2022-08-28

## 2022-08-28 RX ADMIN — IPRATROPIUM BROMIDE AND ALBUTEROL SULFATE 3 ML: .5; 2.5 SOLUTION RESPIRATORY (INHALATION) at 04:54

## 2022-08-28 RX ADMIN — LORAZEPAM 0.5 MG: 0.5 TABLET ORAL at 12:02

## 2022-08-28 RX ADMIN — ROPINIROLE HYDROCHLORIDE 1 MG: 1 TABLET, FILM COATED ORAL at 07:46

## 2022-08-28 RX ADMIN — INSULIN LISPRO 3 UNITS: 100 INJECTION, SOLUTION INTRAVENOUS; SUBCUTANEOUS at 16:22

## 2022-08-28 RX ADMIN — SUCRALFATE 1 G: 1 TABLET ORAL at 11:03

## 2022-08-28 RX ADMIN — ROPINIROLE HYDROCHLORIDE 1 MG: 1 TABLET, FILM COATED ORAL at 13:15

## 2022-08-28 RX ADMIN — SUCRALFATE 1 G: 1 TABLET ORAL at 20:48

## 2022-08-28 RX ADMIN — HEPARIN 100 UNITS: 100 SYRINGE at 20:49

## 2022-08-28 RX ADMIN — Medication 10 ML: at 07:51

## 2022-08-28 RX ADMIN — CARBIDOPA AND LEVODOPA 2 TABLET: 25; 100 TABLET, EXTENDED RELEASE ORAL at 13:15

## 2022-08-28 RX ADMIN — BUDESONIDE 500 MCG: 0.5 SUSPENSION RESPIRATORY (INHALATION) at 18:55

## 2022-08-28 RX ADMIN — LORAZEPAM 0.5 MG: 0.5 TABLET ORAL at 00:22

## 2022-08-28 RX ADMIN — Medication 10 ML: at 07:48

## 2022-08-28 RX ADMIN — SUCRALFATE 1 G: 1 TABLET ORAL at 16:14

## 2022-08-28 RX ADMIN — INSULIN LISPRO 3 UNITS: 100 INJECTION, SOLUTION INTRAVENOUS; SUBCUTANEOUS at 11:04

## 2022-08-28 RX ADMIN — CARBIDOPA AND LEVODOPA 2 TABLET: 25; 100 TABLET, EXTENDED RELEASE ORAL at 07:47

## 2022-08-28 RX ADMIN — IPRATROPIUM BROMIDE AND ALBUTEROL SULFATE 3 ML: .5; 2.5 SOLUTION RESPIRATORY (INHALATION) at 18:55

## 2022-08-28 RX ADMIN — Medication 5 MG: at 20:47

## 2022-08-28 RX ADMIN — METOPROLOL TARTRATE 25 MG: 25 TABLET, FILM COATED ORAL at 20:48

## 2022-08-28 RX ADMIN — IPRATROPIUM BROMIDE AND ALBUTEROL SULFATE 3 ML: .5; 2.5 SOLUTION RESPIRATORY (INHALATION) at 15:35

## 2022-08-28 RX ADMIN — BUDESONIDE 500 MCG: 0.5 SUSPENSION RESPIRATORY (INHALATION) at 04:54

## 2022-08-28 RX ADMIN — APIXABAN 5 MG: 5 TABLET, FILM COATED ORAL at 07:46

## 2022-08-28 RX ADMIN — ANTI-FUNGAL POWDER MICONAZOLE NITRATE TALC FREE 1 EACH: 1.42 POWDER TOPICAL at 07:48

## 2022-08-28 RX ADMIN — DIGOXIN 125 MCG: 125 TABLET ORAL at 07:46

## 2022-08-28 RX ADMIN — ROPINIROLE HYDROCHLORIDE 1 MG: 1 TABLET, FILM COATED ORAL at 20:48

## 2022-08-28 RX ADMIN — AMIODARONE HYDROCHLORIDE 200 MG: 200 TABLET ORAL at 07:46

## 2022-08-28 RX ADMIN — INSULIN GLARGINE 10 UNITS: 100 INJECTION, SOLUTION SUBCUTANEOUS at 21:00

## 2022-08-28 RX ADMIN — SUCRALFATE 1 G: 1 TABLET ORAL at 07:46

## 2022-08-28 RX ADMIN — PREDNISONE 50 MG: 20 TABLET ORAL at 11:06

## 2022-08-28 RX ADMIN — HEPARIN 100 UNITS: 100 SYRINGE at 07:57

## 2022-08-28 RX ADMIN — DILTIAZEM HYDROCHLORIDE 10 MG: 5 INJECTION, SOLUTION INTRAVENOUS at 18:24

## 2022-08-28 RX ADMIN — CARBIDOPA AND LEVODOPA 2 TABLET: 25; 100 TABLET, EXTENDED RELEASE ORAL at 20:52

## 2022-08-28 RX ADMIN — BUMETANIDE 1 MG: 0.25 INJECTION, SOLUTION INTRAMUSCULAR; INTRAVENOUS at 07:46

## 2022-08-28 RX ADMIN — MONTELUKAST 10 MG: 10 TABLET, FILM COATED ORAL at 20:48

## 2022-08-28 RX ADMIN — METOPROLOL TARTRATE 12.5 MG: 25 TABLET, FILM COATED ORAL at 07:46

## 2022-08-28 RX ADMIN — PANTOPRAZOLE SODIUM 40 MG: 40 TABLET, DELAYED RELEASE ORAL at 07:46

## 2022-08-28 RX ADMIN — APIXABAN 5 MG: 5 TABLET, FILM COATED ORAL at 20:48

## 2022-08-28 RX ADMIN — IPRATROPIUM BROMIDE AND ALBUTEROL SULFATE 3 ML: .5; 2.5 SOLUTION RESPIRATORY (INHALATION) at 09:14

## 2022-08-28 RX ADMIN — ANTI-FUNGAL POWDER MICONAZOLE NITRATE TALC FREE 1 EACH: 1.42 POWDER TOPICAL at 20:50

## 2022-08-28 RX ADMIN — DILTIAZEM HYDROCHLORIDE 5 MG/HR: 5 INJECTION, SOLUTION INTRAVENOUS at 18:27

## 2022-08-28 RX ADMIN — ASPIRIN 81 MG: 81 TABLET, COATED ORAL at 07:46

## 2022-08-28 RX ADMIN — INSULIN LISPRO 2 UNITS: 100 INJECTION, SOLUTION INTRAVENOUS; SUBCUTANEOUS at 16:17

## 2022-08-28 RX ADMIN — INSULIN LISPRO 3 UNITS: 100 INJECTION, SOLUTION INTRAVENOUS; SUBCUTANEOUS at 07:43

## 2022-08-28 RX ADMIN — Medication 10 ML: at 20:50

## 2022-08-28 RX ADMIN — BUMETANIDE 1 MG: 0.25 INJECTION, SOLUTION INTRAMUSCULAR; INTRAVENOUS at 20:52

## 2022-08-28 RX ADMIN — ATORVASTATIN CALCIUM 20 MG: 20 TABLET, FILM COATED ORAL at 20:48

## 2022-08-28 ASSESSMENT — PAIN SCALES - GENERAL: PAINLEVEL_OUTOF10: 0

## 2022-08-28 NOTE — PLAN OF CARE
Problem: ABCDS Injury Assessment  Goal: Absence of physical injury  Outcome: Progressing  Flowsheets (Taken 8/27/2022 1000 by Carleen Ruff, RN)  Absence of Physical Injury: Implement safety measures based on patient assessment     Problem: Safety - Adult  Goal: Free from fall injury  Outcome: Progressing  Flowsheets (Taken 8/27/2022 1000 by Carleen Ruff, RN)  Free From Fall Injury: Instruct family/caregiver on patient safety     Problem: Skin/Tissue Integrity  Goal: Absence of new skin breakdown  Description: 1. Monitor for areas of redness and/or skin breakdown  2. Assess vascular access sites hourly  3. Every 4-6 hours minimum:  Change oxygen saturation probe site  4. Every 4-6 hours:  If on nasal continuous positive airway pressure, respiratory therapy assess nares and determine need for appliance change or resting period.   Outcome: Progressing

## 2022-08-28 NOTE — PROGRESS NOTES
Perfect serve and called answering service to leave message to notify Dr Cesar Hernandez of pts elevated heart rate 120-140s sustained

## 2022-08-28 NOTE — PROGRESS NOTES
[facility] Progress Note    Admitting Date and Time: 8/24/2022 11:55 AM  Admit Dx: Atrial fibrillation with rapid ventricular response ([facility] [address] [I48.91]    Subjective:  Patient is being followed for Atrial fibrillation with rapid ventricular response ([facility] [address] [I48.91]     Pt denies pain. Asking me how I think she is doing, if she is getting better. No fevers. Was on BIPAP overnight. Per RN: pt only requiring 2 liters, mostly awake/alert, tolerating BIPAP    ROS: denies fever, chills, cp, sob, n/v, HA unless stated above.       predniSONE  50 mg Oral Daily    bumetanide  1 mg IntraVENous BID    digoxin  125 mcg Oral Daily    sodium chloride flush  5-40 mL IntraVENous 2 times per day    heparin flush  1 mL IntraVENous 2 times per day    benzoin compound   Topical Nightly    insulin glargine  10 Units SubCUTAneous Nightly    insulin lispro  0-8 Units SubCUTAneous TID WC    insulin lispro  0-4 Units SubCUTAneous Nightly    insulin lispro  3 Units SubCUTAneous TID WC    amiodarone  200 mg Oral Daily    aspirin EC  81 mg Oral Daily    apixaban  5 mg Oral BID    rOPINIRole  1 mg Oral TID    sucralfate  1 g Oral 4x Daily    pantoprazole  40 mg Oral QAM    montelukast  10 mg Oral Nightly    miconazole  1 each Topical BID    metoprolol tartrate  12.5 mg Oral BID    ipratropium-albuterol  1 vial Inhalation 4x Daily    carbidopa-levodopa  2 tablet Oral TID    budesonide  0.5 mg Nebulization BID    atorvastatin  20 mg Oral Nightly    sodium chloride flush  5-40 mL IntraVENous 2 times per day    melatonin  5 mg Oral Nightly     sodium chloride flush, 5-40 mL, PRN  sodium chloride, , PRN  heparin flush, 1 mL, PRN  mirtazapine, 15 mg, QHS PRN  LORazepam, 0.5 mg, Q12H PRN  glucose, 4 tablet, PRN  dextrose bolus, 125 mL, PRN   Or  dextrose bolus, 250 mL, PRN  glucagon (rDNA), 1 mg, PRN  dextrose, , Continuous PRN  polyethylene glycol, 17 g, Daily PRN  acetaminophen, 650 mg, Q6H PRN   Or  acetaminophen, 650 mg, Q6H PRN         Objective:    /70   Pulse (!) 114   Temp 97.4 °F (36.3 °C) (Infrared)   Resp 20   Ht 5' (1.524 m)   Wt 239 lb 4 oz (108.5 kg)   SpO2 100%   BMI 46.73 kg/m²     General Appearance: alert and oriented to person, place and time and in no acute distress  Skin: warm and dry, good turgor, no rashes, no jaundice  Head: normocephalic and atraumatic  Eyes: pupils equal, round, and reactive to light, extraocular eye movements intact, conjunctivae normal  Neck: neck supple and non tender without mass   Pulmonary/Chest: pos bilateral exp wheezes today, no rales or rhonchi, normal air movement on the right, decreased air movement left lung, no respiratory distress on nebulizer sitting up in bed  Cardiovascular: normal rate, normal S1 and S2 and no carotid bruits  Abdomen: soft, non-tender, non-distended, normal bowel sounds, no masses or organomegaly  Extremities: no cyanosis, no clubbing and no edema  Neurologic: no cranial nerve deficit and speech at baseline, voice low, can only speak short sentences, mental status baseline        Recent Labs     08/26/22  0515 08/27/22  0646 08/28/22  0615    141 143   K 4.9 4.3 4.0   CL 99 102 101   CO2 39* 33* 35*   BUN 27* 22 18   CREATININE 1.1* 1.0 1.0   GLUCOSE 130* 139* 79   CALCIUM 8.6 8.1* 8.2*         Radiology:   None new    Assessment:    Principal Problem:    Acute respiratory failure with hypoxia and hypercapnia (HCC)  Active Problems:    Encephalopathy acute    Pleural effusion    Acute confusion    Chronic diastolic (congestive) heart failure (HCC)    Macrocytosis    Other specified anemias    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[facility] [address]    Puerperal sepsis with acute hypoxic respiratory failure without septic shock (HCC)    Pulmonary HTN (HCC)    Chronic anticoagulation    Diabetes mellitus (HCC)    Atrial fibrillation with rapid ventricular response (HCC)    Coronary artery disease involving native coronary artery of native heart without angina pectoris  Resolved Problems:    * No resolved hospital problems. *      Plan:  1. Acute hypoxic and hypercapnic respiratory failure -- improved -- etiology of her altered mental status at [facility]. Pro-BNP elevated, but not as high as last admit where it was 37,428. Biggest contributing factor is right pleural effusion -- also pt not on O2 at SNF despite asthma hx  -pulmonology on board  -Intensive RT  -Duonebs every 4 hours  -Pulmicort neb BID  -Start prednisone 40 mg daily  -Discharge with 1.5 liters NC continuous and then Bipap at night hs at [facility]  -O2 sat 92% max -- has severe hypercapnia otherwise     2. Right pleural effusion -- had a chest tube from 7/8 to 7/21 to drain the fluid, found to be a transudate, thought to be due to CHF  -pulmonology on board  -IV Bumex started this AM by cardiology  -Avoid IV fluid  -Salt restriction  -Consider fluid restriction  -Intakes/outputs/daily weights  -s/p thorascentesis on 8/26 -- post-thora CXR looks good -- 900 cc removed     3. SHANTANU -- RESOLVED with holding Bumex -- with hyperkalemia (resolved after 1 dose of kayexalate) -- likely pre-renal, has been on Bumex started last month -- discharged with a BUN/creat of 14/1.0 on 7/27. Is on Eliquis. Likely due to dehydration on Bumex  -Bumex restart by cardiology -- IV   -Repeat BMP daily -watch renal function  -Intakes/outputs/daily weights  -pt's tachycardia improved with 1 liter of normal saline given in ER, had borderline hypotension at 99/60, BP has now normalized (despite being on Cardizem drip)  -Will give dose of Lokelma for elevated potassium today    4. Hx of asthma -- started wheezing this AM  -Intensive RT  -Pulmonology on board  -Prednisone 40 mg daily  -Duonebs every 4 hours for now -- turn down as able  -Pulmicort respules  -Oxygen support -- maintain on 1.5 liters -- O2 sat max 92% with hypercapnia     5.   Rapid afib -- was on Cardizem drip, stopped yesterday after dose of digoxin  -Cardiology has started

## 2022-08-28 NOTE — PROGRESS NOTES
Patient is seen in follow-up for atrial fibrillation    Subjective:     Ms. Mariano Abel in bed sleepy, opens eyes when called by name. Appears to be in mild distress. On 3 L of oxygen via nasal cannula. Feeling better overall, however. Urine output not accurately documented. ROS:  Denies any new complaints.,  Does complain of ongoing dyspnea at rest.    Scheduled Meds:   predniSONE  50 mg Oral Daily    bumetanide  1 mg IntraVENous BID    digoxin  125 mcg Oral Daily    sodium chloride flush  5-40 mL IntraVENous 2 times per day    heparin flush  1 mL IntraVENous 2 times per day    benzoin compound   Topical Nightly    insulin glargine  10 Units SubCUTAneous Nightly    insulin lispro  0-8 Units SubCUTAneous TID WC    insulin lispro  0-4 Units SubCUTAneous Nightly    insulin lispro  3 Units SubCUTAneous TID WC    amiodarone  200 mg Oral Daily    aspirin EC  81 mg Oral Daily    apixaban  5 mg Oral BID    rOPINIRole  1 mg Oral TID    sucralfate  1 g Oral 4x Daily    pantoprazole  40 mg Oral QAM    montelukast  10 mg Oral Nightly    miconazole  1 each Topical BID    metoprolol tartrate  12.5 mg Oral BID    ipratropium-albuterol  1 vial Inhalation 4x Daily    carbidopa-levodopa  2 tablet Oral TID    budesonide  0.5 mg Nebulization BID    atorvastatin  20 mg Oral Nightly    sodium chloride flush  5-40 mL IntraVENous 2 times per day    melatonin  5 mg Oral Nightly     Continuous Infusions:   sodium chloride      dilTIAZem Stopped (08/27/22 1330)    dextrose       PRN Meds:sodium chloride flush, sodium chloride, heparin flush, mirtazapine, LORazepam, glucose, dextrose bolus **OR** dextrose bolus, glucagon (rDNA), dextrose, polyethylene glycol, acetaminophen **OR** acetaminophen    I/O last 3 completed shifts:   In: 12 [P.O.:960]  Out: 300 [Urine:300]  I/O this shift:  In: 240 [P.O.:240]  Out: 2 [Urine:2]      Objective:      Physical Exam:   /70   Pulse (!) 114   Temp 97.4 °F (36.3 °C) (Infrared)   Resp 20   Ht 5' (1.524 m)   Wt 239 lb 4 oz (108.5 kg)   SpO2 100%   BMI 46.73 kg/m²   CONSTITUTIONAL:  awake, alert, cooperative, no apparent distress, and appears stated age  HEAD:  normocepalic, without obvious abnormality, atraumatic  NECK:  Supple, symmetrical, trachea midline, no adenopathy, thyroid symmetric, not enlarged and no tenderness, skin normal  LUNGS:  No increased work of breathing, No accessory muscle use or intercostal retractions, good air exchange, clear to auscultation bilaterally, no crackles or wheezing  CARDIOVASCULAR:  Normal apical impulse, regular rate and rhythm, normal S1 and S2, no S3 or S4, and no murmur noted, no edema, JVP elevated at 14 cm, no carotid bruit. ABDOMEN:  Soft, nontender, no masses, no hepatomegaly, no splenomegaly, BS+  MUSCULOSKELETAL:  No clubbing no cyanosis. there is no redness, warmth, or swelling of the joints  full range of motion noted  NEUROLOGIC:  Alert, awake,oriented x3  SKIN:  no bruising or bleeding, normal skin color, texture, turgor and no redness, warmth, or swelling      Cardiographics  I personally reviewed the telemetry monitor strips with the following interpretation: Atrial fibrillation with controlled ventricular response, runs of A. fib with RVR. Echocardiogram: 7/7/2022,Summary   Technically difficult study - limited visualization. Micro-bubble contrast injected to enhance left ventricular visualization. Normal left ventricular size and systolic function. Ejection fraction is visually estimated at 70-75%. Indeterminate diastolic function. No regional wall motion abnormalities seen. Mild left ventricular concentric hypertrophy noted. Moderately dilated right ventricle with reduced function. Biatrial dilation. Mild tricuspid regurgitation. RVSP is at least 60 mmHg. Prominent pericardial fat pad. No definitive evidence of pericardial effusion.     Imaging  IR GUIDED THORACENTESIS PLEURAL   Final Result   Successful it to 25 mg twice daily. Her rates are more elevated after discontinuation of Cardizem. 3.  I will initiate digoxin 125 mcg daily. Check digoxin level in 3 days. 4.  She has had recurrent atrial fibrillation. She underwent cardioversion both in April and May of this year. She is still on amiodarone 200 mg daily. With her significant morbid obesity as well as ongoing pulmonary issues, I believe rhythm control is going to be difficult. 5.  She does have moderate pulmonary hypertension which is secondary to group 2 as well as group 3 causes. Continue treatment of underlying issues. No family at bedside  Electronically signed by Richard Saha MD on 8/28/2022 at 1:11 PM  NOTE: This report was transcribed using voice recognition software.  Every effort was made to ensure accuracy; however, inadvertent computerized transcription errors may be present

## 2022-08-28 NOTE — PLAN OF CARE
Problem: ABCDS Injury Assessment  Goal: Absence of physical injury  8/28/2022 0152 by Littie Dakins, RN  Outcome: Progressing  8/28/2022 0151 by Littie Dakins, RN  Outcome: Progressing  8/27/2022 2041 by Littie Dakins, RN  Outcome: Progressing  8/27/2022 2040 by Littie Dakins, RN  Outcome: Progressing  Flowsheets (Taken 8/27/2022 1000 by Franca Capone RN)  Absence of Physical Injury: Implement safety measures based on patient assessment

## 2022-08-28 NOTE — PROGRESS NOTES
Pulmonary/Critical Care Progress Note    We are following patient for acute obesity, bilateral pleural effusions (transudate), alveolar hypoventilation syndrome, HFpEF, morbid obesity    SUBJECTIVE:  Patient is much brighter and looks much better. The patient was started on oral steroids at 50 mg/day but this can now be tapered without difficulty. Renal function has improved with diuresis.   Chest x-ray will be repeated tomorrow    MEDICATIONS:   metoprolol tartrate  25 mg Oral BID    [START ON 8/29/2022] predniSONE  40 mg Oral Daily    bumetanide  1 mg IntraVENous BID    digoxin  125 mcg Oral Daily    sodium chloride flush  5-40 mL IntraVENous 2 times per day    heparin flush  1 mL IntraVENous 2 times per day    benzoin compound   Topical Nightly    insulin glargine  10 Units SubCUTAneous Nightly    insulin lispro  0-8 Units SubCUTAneous TID WC    insulin lispro  0-4 Units SubCUTAneous Nightly    insulin lispro  3 Units SubCUTAneous TID WC    amiodarone  200 mg Oral Daily    aspirin EC  81 mg Oral Daily    apixaban  5 mg Oral BID    rOPINIRole  1 mg Oral TID    sucralfate  1 g Oral 4x Daily    pantoprazole  40 mg Oral QAM    montelukast  10 mg Oral Nightly    miconazole  1 each Topical BID    ipratropium-albuterol  1 vial Inhalation 4x Daily    carbidopa-levodopa  2 tablet Oral TID    budesonide  0.5 mg Nebulization BID    atorvastatin  20 mg Oral Nightly    sodium chloride flush  5-40 mL IntraVENous 2 times per day    melatonin  5 mg Oral Nightly      sodium chloride      dilTIAZem Stopped (08/27/22 1330)    dextrose       sodium chloride flush, sodium chloride, heparin flush, mirtazapine, LORazepam, glucose, dextrose bolus **OR** dextrose bolus, glucagon (rDNA), dextrose, polyethylene glycol, acetaminophen **OR** acetaminophen      REVIEW OF SYSTEMS:  Constitutional: Denies fever, weight loss, night sweats, and fatigue  Skin: Denies pigmentation, dark lesions, and rashes   HEENT: Denies hearing loss, tinnitus, - 48.0 % Final   08/24/2022 33.3 (L) 34.0 - 48.0 % Final     MCV   Date Value Ref Range Status   08/25/2022 98.4 80.0 - 99.9 fL Final   08/24/2022 100.6 (H) 80.0 - 99.9 fL Final   08/24/2022 100.9 (H) 80.0 - 99.9 fL Final     Platelets   Date Value Ref Range Status   08/25/2022 249 130 - 450 E9/L Final   08/24/2022 198 130 - 450 E9/L Final   08/24/2022 192 130 - 450 E9/L Final     Sodium   Date Value Ref Range Status   08/28/2022 143 132 - 146 mmol/L Final   08/27/2022 141 132 - 146 mmol/L Final   08/26/2022 140 132 - 146 mmol/L Final     Potassium reflex Magnesium   Date Value Ref Range Status   08/28/2022 4.0 3.5 - 5.0 mmol/L Final   08/27/2022 4.3 3.5 - 5.0 mmol/L Final   08/26/2022 4.9 3.5 - 5.0 mmol/L Final     Chloride   Date Value Ref Range Status   08/28/2022 101 98 - 107 mmol/L Final   08/27/2022 102 98 - 107 mmol/L Final   08/26/2022 99 98 - 107 mmol/L Final     CO2   Date Value Ref Range Status   08/28/2022 35 (H) 22 - 29 mmol/L Final   08/27/2022 33 (H) 22 - 29 mmol/L Final   08/26/2022 39 (H) 22 - 29 mmol/L Final     BUN   Date Value Ref Range Status   08/28/2022 18 6 - 23 mg/dL Final   08/27/2022 22 6 - 23 mg/dL Final   08/26/2022 27 (H) 6 - 23 mg/dL Final     Creatinine   Date Value Ref Range Status   08/28/2022 1.0 0.5 - 1.0 mg/dL Final   08/27/2022 1.0 0.5 - 1.0 mg/dL Final   08/26/2022 1.1 (H) 0.5 - 1.0 mg/dL Final     Glucose   Date Value Ref Range Status   08/28/2022 79 74 - 99 mg/dL Final   08/27/2022 139 (H) 74 - 99 mg/dL Final   08/26/2022 130 (H) 74 - 99 mg/dL Final     Calcium   Date Value Ref Range Status   08/28/2022 8.2 (L) 8.6 - 10.2 mg/dL Final   08/27/2022 8.1 (L) 8.6 - 10.2 mg/dL Final   08/26/2022 8.6 8.6 - 10.2 mg/dL Final     Total Protein   Date Value Ref Range Status   08/25/2022 6.0 (L) 6.4 - 8.3 g/dL Final   08/24/2022 6.7 6.4 - 8.3 g/dL Final   08/24/2022 6.4 6.4 - 8.3 g/dL Final     Albumin   Date Value Ref Range Status   08/25/2022 3.8 3.5 - 5.2 g/dL Final   08/24/2022 4.0 3.5 - 5.2 g/dL Final   08/24/2022 4.0 3.5 - 5.2 g/dL Final     Total Bilirubin   Date Value Ref Range Status   08/25/2022 0.6 0.0 - 1.2 mg/dL Final   08/24/2022 0.5 0.0 - 1.2 mg/dL Final   08/24/2022 0.5 0.0 - 1.2 mg/dL Final     Alkaline Phosphatase   Date Value Ref Range Status   08/25/2022 100 35 - 104 U/L Final   08/24/2022 105 (H) 35 - 104 U/L Final   08/24/2022 102 35 - 104 U/L Final     AST   Date Value Ref Range Status   08/25/2022 8 0 - 31 U/L Final   08/24/2022 8 0 - 31 U/L Final   08/24/2022 11 0 - 31 U/L Final     ALT   Date Value Ref Range Status   08/25/2022 <5 0 - 32 U/L Final   08/24/2022 <5 0 - 32 U/L Final   08/24/2022 <5 0 - 32 U/L Final     GFR Non-   Date Value Ref Range Status   08/28/2022 54 >=60 mL/min/1.73 Final     Comment:     Chronic Kidney Disease: less than 60 ml/min/1.73 sq.m. Kidney Failure: less than 15 ml/min/1.73 sq.m. Results valid for patients 18 years and older. 08/27/2022 54 >=60 mL/min/1.73 Final     Comment:     Chronic Kidney Disease: less than 60 ml/min/1.73 sq.m. Kidney Failure: less than 15 ml/min/1.73 sq.m. Results valid for patients 18 years and older. 08/26/2022 49 >=60 mL/min/1.73 Final     Comment:     Chronic Kidney Disease: less than 60 ml/min/1.73 sq.m. Kidney Failure: less than 15 ml/min/1.73 sq.m. Results valid for patients 18 years and older.        GFR    Date Value Ref Range Status   08/28/2022 >60  Final   08/27/2022 >60  Final   08/26/2022 59  Final     Magnesium   Date Value Ref Range Status   07/27/2022 2.1 1.6 - 2.6 mg/dL Final   07/26/2022 1.9 1.6 - 2.6 mg/dL Final   07/25/2022 2.0 1.6 - 2.6 mg/dL Final     Phosphorus   Date Value Ref Range Status   07/27/2022 3.9 2.5 - 4.5 mg/dL Final   07/26/2022 4.6 (H) 2.5 - 4.5 mg/dL Final   07/25/2022 3.9 2.5 - 4.5 mg/dL Final     Recent Labs     08/26/22  1723   PH 7.256*   PO2 65.6*   PCO2 83.9*   HCO3 36.5*   BE 7.1*   O2SAT 91.9* RADIOLOGY:  IR GUIDED THORACENTESIS PLEURAL   Final Result   Successful ultrasound guided thoracentesis. XR CHEST 1 VIEW   Final Result   1. There is no right pneumothorax status post right thoracentesis   2. Right lung base atelectasis   3. The left lung is clear. XR CHEST PORTABLE   Final Result   1. Cardiomegaly with findings of CHF   2. Moderate size right pleural effusion   3. Trace left pleural effusion         XR CHEST PORTABLE    (Results Pending)           PROBLEM LIST:  Principal Problem:    Acute respiratory failure with hypoxia and hypercapnia ([facility])  Active Problems:    Acute decompensated heart failure (HCC)    Encephalopathy acute    Pleural effusion    Acute confusion    Chronic diastolic (congestive) heart failure (HCC)    Macrocytosis    Other specified anemias    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[address]    Puerperal sepsis with acute hypoxic respiratory failure without septic shock ([facility])    Pulmonary HTN (HCC)    Chronic anticoagulation    RVF (right ventricular failure) ([facility])    Diabetes mellitus ([facility])    Atrial fibrillation with rapid ventricular response ([address]    Coronary artery disease involving native coronary artery of native heart without angina pectoris  Resolved Problems:    * No resolved hospital problems.  *      IMPRESSION:  HFpEF  Bilateral pleural effusions right greater than left  Status post right pleural effusion for almost a liter of transudative fluid  Cor pulmonale  Alveolar hypoventilation syndrome  Obstructive sleep apnea  Chronic atrial fibrillation  Diabetes mellitus type 2    PLAN:  Await cytology results on pleural fluid  Diabetes mellitus type 2  Diuresis per cardiology service  A. fib controlled per cardiology  Chest x-ray in a.m. to make sure that she has not reaccumulated her large right pleural effusion          Electronically signed by Ben Chan MD on 8/28/2022 at 4:18 PM

## 2022-08-29 ENCOUNTER — APPOINTMENT (OUTPATIENT)
Dept: GENERAL RADIOLOGY | Age: 72
DRG: 189 | End: 2022-08-29
Payer: MEDICARE

## 2022-08-29 LAB
ANION GAP SERPL CALCULATED.3IONS-SCNC: 9 MMOL/L (ref 7–16)
BLOOD CULTURE, ROUTINE: NORMAL
BUN BLDV-MCNC: 20 MG/DL (ref 6–23)
CALCIUM SERPL-MCNC: 8.1 MG/DL (ref 8.6–10.2)
CHLORIDE BLD-SCNC: 100 MMOL/L (ref 98–107)
CO2: 34 MMOL/L (ref 22–29)
CREAT SERPL-MCNC: 0.9 MG/DL (ref 0.5–1)
CULTURE, BLOOD 2: NORMAL
GFR AFRICAN AMERICAN: >60
GFR NON-AFRICAN AMERICAN: >60 ML/MIN/1.73
GLUCOSE BLD-MCNC: 146 MG/DL (ref 74–99)
METER GLUCOSE: 157 MG/DL (ref 74–99)
METER GLUCOSE: 202 MG/DL (ref 74–99)
METER GLUCOSE: 226 MG/DL (ref 74–99)
METER GLUCOSE: 275 MG/DL (ref 74–99)
POTASSIUM REFLEX MAGNESIUM: 3.9 MMOL/L (ref 3.5–5)
SODIUM BLD-SCNC: 143 MMOL/L (ref 132–146)

## 2022-08-29 PROCEDURE — 97535 SELF CARE MNGMENT TRAINING: CPT | Performed by: OCCUPATIONAL THERAPIST

## 2022-08-29 PROCEDURE — 82962 GLUCOSE BLOOD TEST: CPT

## 2022-08-29 PROCEDURE — 94640 AIRWAY INHALATION TREATMENT: CPT

## 2022-08-29 PROCEDURE — 6370000000 HC RX 637 (ALT 250 FOR IP): Performed by: INTERNAL MEDICINE

## 2022-08-29 PROCEDURE — 2500000003 HC RX 250 WO HCPCS: Performed by: INTERNAL MEDICINE

## 2022-08-29 PROCEDURE — 6360000002 HC RX W HCPCS: Performed by: FAMILY MEDICINE

## 2022-08-29 PROCEDURE — 94660 CPAP INITIATION&MGMT: CPT

## 2022-08-29 PROCEDURE — 99233 SBSQ HOSP IP/OBS HIGH 50: CPT | Performed by: FAMILY MEDICINE

## 2022-08-29 PROCEDURE — 97530 THERAPEUTIC ACTIVITIES: CPT | Performed by: PHYSICAL THERAPIST

## 2022-08-29 PROCEDURE — 2060000000 HC ICU INTERMEDIATE R&B

## 2022-08-29 PROCEDURE — 99233 SBSQ HOSP IP/OBS HIGH 50: CPT | Performed by: INTERNAL MEDICINE

## 2022-08-29 PROCEDURE — 71045 X-RAY EXAM CHEST 1 VIEW: CPT

## 2022-08-29 PROCEDURE — 97161 PT EVAL LOW COMPLEX 20 MIN: CPT | Performed by: PHYSICAL THERAPIST

## 2022-08-29 PROCEDURE — 2700000000 HC OXYGEN THERAPY PER DAY

## 2022-08-29 PROCEDURE — 97165 OT EVAL LOW COMPLEX 30 MIN: CPT | Performed by: OCCUPATIONAL THERAPIST

## 2022-08-29 PROCEDURE — 36415 COLL VENOUS BLD VENIPUNCTURE: CPT

## 2022-08-29 PROCEDURE — 6370000000 HC RX 637 (ALT 250 FOR IP): Performed by: FAMILY MEDICINE

## 2022-08-29 PROCEDURE — 80048 BASIC METABOLIC PNL TOTAL CA: CPT

## 2022-08-29 PROCEDURE — 2580000003 HC RX 258: Performed by: FAMILY MEDICINE

## 2022-08-29 RX ORDER — METOPROLOL SUCCINATE 50 MG/1
50 TABLET, EXTENDED RELEASE ORAL 2 TIMES DAILY
Status: DISCONTINUED | OUTPATIENT
Start: 2022-08-29 | End: 2022-08-31

## 2022-08-29 RX ADMIN — Medication 5 MG: at 20:32

## 2022-08-29 RX ADMIN — SUCRALFATE 1 G: 1 TABLET ORAL at 11:14

## 2022-08-29 RX ADMIN — IPRATROPIUM BROMIDE AND ALBUTEROL SULFATE 3 ML: .5; 2.5 SOLUTION RESPIRATORY (INHALATION) at 15:02

## 2022-08-29 RX ADMIN — ROPINIROLE HYDROCHLORIDE 1 MG: 1 TABLET, FILM COATED ORAL at 20:32

## 2022-08-29 RX ADMIN — ACETAMINOPHEN 650 MG: 325 TABLET ORAL at 18:27

## 2022-08-29 RX ADMIN — SUCRALFATE 1 G: 1 TABLET ORAL at 20:32

## 2022-08-29 RX ADMIN — INSULIN GLARGINE 10 UNITS: 100 INJECTION, SOLUTION SUBCUTANEOUS at 20:36

## 2022-08-29 RX ADMIN — ANTI-FUNGAL POWDER MICONAZOLE NITRATE TALC FREE 1 EACH: 1.42 POWDER TOPICAL at 20:33

## 2022-08-29 RX ADMIN — PREDNISONE 40 MG: 20 TABLET ORAL at 08:04

## 2022-08-29 RX ADMIN — PANTOPRAZOLE SODIUM 40 MG: 40 TABLET, DELAYED RELEASE ORAL at 08:04

## 2022-08-29 RX ADMIN — Medication 10 ML: at 08:05

## 2022-08-29 RX ADMIN — BUDESONIDE 500 MCG: 0.5 SUSPENSION RESPIRATORY (INHALATION) at 18:35

## 2022-08-29 RX ADMIN — Medication 10 ML: at 08:03

## 2022-08-29 RX ADMIN — INSULIN LISPRO 3 UNITS: 100 INJECTION, SOLUTION INTRAVENOUS; SUBCUTANEOUS at 16:21

## 2022-08-29 RX ADMIN — SUCRALFATE 1 G: 1 TABLET ORAL at 08:06

## 2022-08-29 RX ADMIN — CARBIDOPA AND LEVODOPA 2 TABLET: 25; 100 TABLET, EXTENDED RELEASE ORAL at 20:32

## 2022-08-29 RX ADMIN — AMIODARONE HYDROCHLORIDE 200 MG: 200 TABLET ORAL at 08:04

## 2022-08-29 RX ADMIN — INSULIN LISPRO 3 UNITS: 100 INJECTION, SOLUTION INTRAVENOUS; SUBCUTANEOUS at 08:21

## 2022-08-29 RX ADMIN — BUMETANIDE 1 MG: 0.25 INJECTION, SOLUTION INTRAMUSCULAR; INTRAVENOUS at 08:03

## 2022-08-29 RX ADMIN — IPRATROPIUM BROMIDE AND ALBUTEROL SULFATE 3 ML: .5; 2.5 SOLUTION RESPIRATORY (INHALATION) at 04:39

## 2022-08-29 RX ADMIN — ROPINIROLE HYDROCHLORIDE 1 MG: 1 TABLET, FILM COATED ORAL at 13:30

## 2022-08-29 RX ADMIN — BUMETANIDE 1 MG: 0.25 INJECTION, SOLUTION INTRAMUSCULAR; INTRAVENOUS at 20:32

## 2022-08-29 RX ADMIN — LORAZEPAM 0.5 MG: 0.5 TABLET ORAL at 13:34

## 2022-08-29 RX ADMIN — INSULIN LISPRO 2 UNITS: 100 INJECTION, SOLUTION INTRAVENOUS; SUBCUTANEOUS at 16:05

## 2022-08-29 RX ADMIN — MONTELUKAST 10 MG: 10 TABLET, FILM COATED ORAL at 20:32

## 2022-08-29 RX ADMIN — METOPROLOL TARTRATE 25 MG: 25 TABLET, FILM COATED ORAL at 08:04

## 2022-08-29 RX ADMIN — ANTI-FUNGAL POWDER MICONAZOLE NITRATE TALC FREE 1 EACH: 1.42 POWDER TOPICAL at 08:05

## 2022-08-29 RX ADMIN — LORAZEPAM 0.5 MG: 0.5 TABLET ORAL at 00:33

## 2022-08-29 RX ADMIN — DIGOXIN 125 MCG: 125 TABLET ORAL at 08:04

## 2022-08-29 RX ADMIN — INSULIN LISPRO 2 UNITS: 100 INJECTION, SOLUTION INTRAVENOUS; SUBCUTANEOUS at 11:16

## 2022-08-29 RX ADMIN — HEPARIN 100 UNITS: 100 SYRINGE at 08:04

## 2022-08-29 RX ADMIN — APIXABAN 5 MG: 5 TABLET, FILM COATED ORAL at 20:32

## 2022-08-29 RX ADMIN — HEPARIN 100 UNITS: 100 SYRINGE at 20:32

## 2022-08-29 RX ADMIN — INSULIN LISPRO 3 UNITS: 100 INJECTION, SOLUTION INTRAVENOUS; SUBCUTANEOUS at 12:30

## 2022-08-29 RX ADMIN — ATORVASTATIN CALCIUM 20 MG: 20 TABLET, FILM COATED ORAL at 20:32

## 2022-08-29 RX ADMIN — ASPIRIN 81 MG: 81 TABLET, COATED ORAL at 08:04

## 2022-08-29 RX ADMIN — ROPINIROLE HYDROCHLORIDE 1 MG: 1 TABLET, FILM COATED ORAL at 08:03

## 2022-08-29 RX ADMIN — BUDESONIDE 500 MCG: 0.5 SUSPENSION RESPIRATORY (INHALATION) at 04:39

## 2022-08-29 RX ADMIN — CARBIDOPA AND LEVODOPA 2 TABLET: 25; 100 TABLET, EXTENDED RELEASE ORAL at 13:30

## 2022-08-29 RX ADMIN — APIXABAN 5 MG: 5 TABLET, FILM COATED ORAL at 08:03

## 2022-08-29 RX ADMIN — METOPROLOL SUCCINATE 50 MG: 50 TABLET, EXTENDED RELEASE ORAL at 20:32

## 2022-08-29 RX ADMIN — Medication 10 ML: at 20:33

## 2022-08-29 RX ADMIN — IPRATROPIUM BROMIDE AND ALBUTEROL SULFATE 3 ML: .5; 2.5 SOLUTION RESPIRATORY (INHALATION) at 18:35

## 2022-08-29 RX ADMIN — SUCRALFATE 1 G: 1 TABLET ORAL at 16:05

## 2022-08-29 RX ADMIN — IPRATROPIUM BROMIDE AND ALBUTEROL SULFATE 3 ML: .5; 2.5 SOLUTION RESPIRATORY (INHALATION) at 09:22

## 2022-08-29 RX ADMIN — CARBIDOPA AND LEVODOPA 2 TABLET: 25; 100 TABLET, EXTENDED RELEASE ORAL at 08:55

## 2022-08-29 ASSESSMENT — PAIN DESCRIPTION - ORIENTATION: ORIENTATION: RIGHT

## 2022-08-29 ASSESSMENT — PAIN SCALES - WONG BAKER
WONGBAKER_NUMERICALRESPONSE: 0

## 2022-08-29 ASSESSMENT — PAIN SCALES - GENERAL
PAINLEVEL_OUTOF10: 2
PAINLEVEL_OUTOF10: 0

## 2022-08-29 ASSESSMENT — PAIN - FUNCTIONAL ASSESSMENT: PAIN_FUNCTIONAL_ASSESSMENT: PREVENTS OR INTERFERES SOME ACTIVE ACTIVITIES AND ADLS

## 2022-08-29 ASSESSMENT — PAIN DESCRIPTION - DESCRIPTORS: DESCRIPTORS: ACHING

## 2022-08-29 NOTE — PROGRESS NOTES
Educated patient on the necessity of obtaining accurate I's and O's. Patient can not appropriately push call button to get on bed pan and is wet constantly. Will communicate to day shift to potentially get a genao in order to better manage her output.

## 2022-08-29 NOTE — PLAN OF CARE
Problem: ABCDS Injury Assessment  Goal: Absence of physical injury  Outcome: Progressing  Flowsheets (Taken 8/28/2022 1134 by Albaro Dubose RN)  Absence of Physical Injury: Implement safety measures based on patient assessment     Problem: Safety - Adult  Goal: Free from fall injury  Outcome: Progressing  Flowsheets (Taken 8/28/2022 1134 by Albaro Dubose RN)  Free From Fall Injury: Instruct family/caregiver on patient safety     Problem: Skin/Tissue Integrity  Goal: Absence of new skin breakdown  Description: 1. Monitor for areas of redness and/or skin breakdown  2. Assess vascular access sites hourly  3. Every 4-6 hours minimum:  Change oxygen saturation probe site  4. Every 4-6 hours:  If on nasal continuous positive airway pressure, respiratory therapy assess nares and determine need for appliance change or resting period.   Outcome: Progressing

## 2022-08-29 NOTE — CARE COORDINATION
8/29/2022 1335 CM Note:  COVID Negative 8/25/2022. CM for transition of care needs at d/c. Pt resides at [address] [place] [address] and will return there. Per [facility] she is a long term resident and a bed hold, if skilled need at d/c they will submit for precert but won't have to wait for auth. Will need signed [place] and the Covid testing from today 8/25 will be sufficient unless hospitalized for extended period of time. CM will follow.   Burgess Marquez RN

## 2022-08-29 NOTE — PROGRESS NOTES
[facility] Progress Note    Admitting Date and Time: 8/24/2022 11:55 AM  Admit Dx: Atrial fibrillation with rapid ventricular response ([address] [I48.91]    Subjective:  Patient is being followed for Atrial fibrillation with rapid ventricular response ([address] [I48.91]   Pt feels still feels SOB. Per RN:   no new concerns. PT/OT ordered.     ROS: denies fever, chills, cp, sob, n/v, HA unless stated above.      metoprolol succinate  50 mg Oral BID    predniSONE  40 mg Oral Daily    bumetanide  1 mg IntraVENous BID    digoxin  125 mcg Oral Daily    sodium chloride flush  5-40 mL IntraVENous 2 times per day    heparin flush  1 mL IntraVENous 2 times per day    benzoin compound   Topical Nightly    insulin glargine  10 Units SubCUTAneous Nightly    insulin lispro  0-8 Units SubCUTAneous TID WC    insulin lispro  0-4 Units SubCUTAneous Nightly    insulin lispro  3 Units SubCUTAneous TID WC    amiodarone  200 mg Oral Daily    aspirin EC  81 mg Oral Daily    apixaban  5 mg Oral BID    rOPINIRole  1 mg Oral TID    sucralfate  1 g Oral 4x Daily    pantoprazole  40 mg Oral QAM    montelukast  10 mg Oral Nightly    miconazole  1 each Topical BID    ipratropium-albuterol  1 vial Inhalation 4x Daily    carbidopa-levodopa  2 tablet Oral TID    budesonide  0.5 mg Nebulization BID    atorvastatin  20 mg Oral Nightly    sodium chloride flush  5-40 mL IntraVENous 2 times per day    melatonin  5 mg Oral Nightly     sodium chloride flush, 5-40 mL, PRN  sodium chloride, , PRN  heparin flush, 1 mL, PRN  mirtazapine, 15 mg, QHS PRN  LORazepam, 0.5 mg, Q12H PRN  glucose, 4 tablet, PRN  dextrose bolus, 125 mL, PRN   Or  dextrose bolus, 250 mL, PRN  glucagon (rDNA), 1 mg, PRN  dextrose, , Continuous PRN  polyethylene glycol, 17 g, Daily PRN  acetaminophen, 650 mg, Q6H PRN   Or  acetaminophen, 650 mg, Q6H PRN         Objective:    /62   Pulse 95   Temp 97.3 °F (36.3 °C) (Infrared)   Resp 20   Ht 5' (1.524 m)   Wt 232 lb 7 oz (105.4 kg)   SpO2 96%   BMI 45.39 kg/m²     General Appearance: alert and oriented to person, place and time and in no acute distress  Skin: warm and dry  Head: normocephalic and atraumatic  Eyes: pupils equal, round, and reactive to light, extraocular eye movements intact, conjunctivae normal  Neck: neck supple and non tender without mass   Pulmonary/Chest: clear to auscultation bilaterally- no wheezes, rales or rhonchi, normal air movement, no respiratory distress  Cardiovascular: normal rate, normal S1 and S2 and no carotid bruits  Abdomen: soft, non-tender, non-distended, normal bowel sounds, no masses or organomegaly  Extremities: no cyanosis, no clubbing and no edema  Neurologic: no cranial nerve deficit and speech normal        Recent Labs     08/27/22  0646 08/28/22  0615 08/29/22  0615    143 143   K 4.3 4.0 3.9    101 100   CO2 33* 35* 34*   BUN 22 18 20   CREATININE 1.0 1.0 0.9   GLUCOSE 139* 79 146*   CALCIUM 8.1* 8.2* 8.1*     Radiology:   XR CHEST PORTABLE    Result Date: 8/29/2022  EXAMINATION: ONE XRAY VIEW OF THE CHEST 8/29/2022 6:34 am COMPARISON: 08/26/2022 HISTORY: ORDERING SYSTEM PROVIDED HISTORY: SOB TECHNOLOGIST PROVIDED HISTORY: Reason for exam:->SOB FINDINGS: Heart is upper limits of normal in size. There is reaccumulation of a small right pleural effusion which appears partially loculated in the major fissure. No pneumothorax. No new alveolar consolidation. Osseous structures are stable. Unchanged postop change in the right chest wall soft tissues. 1.  Small right pleural effusion. 2.  No other significant interval change.       Assessment:    Principal Problem:    Acute respiratory failure with hypoxia and hypercapnia (HCC)  Active Problems:    Acute decompensated heart failure (HCC)    Encephalopathy acute    Pleural effusion    Acute confusion    Chronic diastolic (congestive) heart failure (HCC)    Macrocytosis    Other specified anemias    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[facility]    Puerperal sepsis with acute hypoxic respiratory failure without septic shock (HCC)    Pulmonary HTN (HCC)    Chronic anticoagulation    RVF (right ventricular failure) (HCC)    Diabetes mellitus ([facility]    Atrial fibrillation with rapid ventricular response ([facility]    Coronary artery disease involving native coronary artery of native heart without angina pectoris  Resolved Problems:    * No resolved hospital problems. *      Plan:  1. A/C Respiratory failure with hypoxia and hypercapnia - continue respiratory toilet and medications. Currently o RA. PT/OT to start with evaluations and daily sessions. 2.  Right pleural effusion, decreased - continue current regiment. Is and Os. Saline lock IV. 3.  SHANTANU, resolved - avoid nephrotoxins. Trend labs and treat accordingly. 4.  Atrial fibrillation - off Cardizem gtt on oral meds. HR < 100. Continue the same. Telemetry monitoring. 5.  T2DM - Diabetic diet. Glucose POCT. Continue Lantus, 3 units of Humalog with meals and MDSS insulin scale. On 8/24/22, HgbA1C 6.1%, excellent control. Hypoglycemia protocol. 6.  Encephalopathy, improved - supportive care. Total care time:  20 minutes    NOTE: This report was transcribed using voice recognition software. Every effort was made to ensure accuracy; however, inadvertent computerized transcription errors may be present.     Electronically signed by Deniz Gibbons MD on 8/29/2022 at 11:50 AM

## 2022-08-29 NOTE — PROGRESS NOTES
acidosis    Delirium    Acute on chronic anemia    Pleural effusion    Acute respiratory failure with hypoxia and hypercapnia (HCC)    Acute confusion    Chronic diastolic (congestive) heart failure (HCC)    Macrocytosis    Other specified anemias    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[facility]    Puerperal sepsis with acute hypoxic respiratory failure without septic shock (HCC)    Pulmonary HTN (HCC)    Chronic anticoagulation    RVF (right ventricular failure) ([facility])        ASSESSMENT of Current Deficits Patient exhibits decreased strength, balance, and endurance impairing functional mobility, transfers, gait , gait distance, and tolerance to activity. Pt limited during function by SpO2 and HR during session. Pt HR ranged from  sitting EOB and transferring to chair. Pt needed to use bedpan 2x during session with assistance needed from therapy for mobility during cleanup. PHYSICAL THERAPY  PLAN OF CARE       Physical therapy plan of care is established based on physician order,  patient diagnosis and clinical assessment    Current Treatment Recommendations:    -Bed Mobility: Lower extremity exercises  and Trunk control activities   -Sitting Balance: Incorporate reaching activities to activate trunk muscles , Hands on support to maintain midline , Facilitate active trunk muscle engagement , Facilitate postural control in all planes , and Engage in core activities to allow for movement within base of support   -Standing Balance: Perform strengthening exercises in standing to promote motor control with or without upper extremity support , Instruct patient on adequate base of support to maintain balance, and Challenge balance utilizing reaching  activities beyond center of gravity    -Transfers: Provide instruction on proper hand and foot position for adequate transfer of weight onto lower extremities and use of gait device if needed, Cues for hand placement, technique and safety.  Provide stabilization to prevent fall , Facilitate weight shift forward on to lower extremities and provide necessary stabilization of bilateral lower extremities , Support transfer of weight on to lower extremities, and Assist with extension of knees trunk and hip to accept weight transfer   -Gait: Gait training, Standing activities to improve: base of support, weight shift, weight bearing , Exercises to improve trunk control, Exercises to improve hip and knee control, Performance of protected weight bearing activities, and Activities to increase weight bearing   -Endurance: Utilize Supervised activities to increase level of endurance to allow for safe functional mobility including transfers and gait  and Use graduated activities to promote good breathing techniques and provide support and education to maximize respiratory function    PT long term treatment goals are located in below grid    Patient and or family understand(s) diagnosis, prognosis, and plan of care.     Frequency of treatments: Patient will be seen  dailyyy         Prior Level of Function: Patient ambulated with wheeled walker   Rehab Potential: good - for baseline    Past medical history:   Past Medical History:   Diagnosis Date    A-fib ([facility]     Acute on chronic congestive heart failure (HCC)     Anxiety     Asthma     CAD (coronary artery disease) 01/21/2016    Cancer ([facility]  breast ca 2006    right lumpectomy    Chronic kidney disease     nephrolithiasis    Depression     Diabetes mellitus ([facility]     H/O mammogram     Hx MRSA infection     toe infection january 2012    Hyperlipidemia     Hypertension     Lateral epicondylitis     SERENA on CPAP     Parkinson's disease ([facility]     Tubal ligation status      Past Surgical History:   Procedure Laterality Date    BREAST LUMPECTOMY      BREAST REDUCTION SURGERY      CARDIAC CATHETERIZATION  4/28/2014    Dr. Aneesh Mart  01/11/2022    Dr. Jaqueline So  7/29/15    CT [place] Spangler Independent    Ambulation     3-5 feet using  wheeled walker with Minimal assist of 1   for walker control, walker approximation, balance, and safety    > 75 feet using  wheeled walker with Modified Independent    Stair negotiation: ascended and descended   Not assessed        ROM Within functional limits    Increase range of motion 10% of affected joints    Strength BUE:  refer to OT eval  RLE:  4-/5  LLE:  4-/5  Increase strength in affected mm groups by 1/3 grade   Balance Sitting EOB:  fair +  Dynamic Standing:  fair   Sitting EOB:  good   Dynamic Standing: fair +     Patient is Alert & Oriented x person, place, time, and situation and follows directions    Sensation:  Patient  denies numbness/tingling   Edema:  no   Endurance: poor +    Vitals:  3 liters nasal cannula   Blood Pressure at rest  Blood Pressure during session    Heart Rate at rest 90 Heart Rate during session    SPO2 at rest 94%  SPO2 during session 85-94%     Patient education  Patient educated on role of Physical Therapy, risks of immobility, safety and plan of care, energy conservation,  importance of mobility while in hospital , purse lip breathing, ankle pumps, quad set and glut set for edema control, blood clot prevention, importance and purpose of adaptive device and adjusted to proper height for the patient. , safety , and O2 line management and safety      Patient response to education:   Pt verbalized understanding Pt demonstrated skill Pt requires further education in this area   Yes Partial Yes      Treatment:  Patient practiced and was instructed/facilitated in the following treatment: Patient Sat edge of bed 15 minutes with Supervision  to increase dynamic sitting balance and activity tolerance. Pt performed bed mobility, transfers, short ambulation, sat EOB, rolled during hygiene.       Therapeutic Exercises:  not performed     At end of session, patient in bed with     call light and phone within reach,  all lines and tubes intact, nursing notified. Patient would benefit from continued skilled Physical Therapy to improve functional independence and quality of life. Patient's/ family goals   rehab    Time in  1219  Time out  1255    Total Treatment Time  16 minutes    Evaluation time includes thorough review of current medical information, gathering information on past medical history/social history and prior level of function, completion of standardized testing/informal observation of tasks, assessment of data, and development of Plan of care and goals.      CPT codes:  Low Complexity PT evaluation (06120)  Therapeutic activities (19904)   16 minutes  1 unit(s)    Guilherme Sy, PT

## 2022-08-29 NOTE — PROGRESS NOTES
Patient is seen in follow-up for atrial fibrillation    Date of service: 8/29/2022    Subjective:   No new overnight cardiac complaints. No chest pain or palpitations. +SOB, but respiratory status improved since admission per patient. AF at rate 90's-110 on telemetry. I/O's inaccurate (urine output more than documented).     ROS:  Cardiac: As per HPI  General: No fever, chills  Pulmonary: As per HPI  HEENT: No visual disturbances, difficult swallowing  GI: No nausea, vomiting  : No dysuria, hematuria  Endocrine: No thyroid disease, +DM  Musculoskeletal: HDZ x 4, no focal motor deficits  Skin: Intact, no rashes  Neuro: No headache, seizures  Psych: Currently with no depression, anxiety    Scheduled Meds:   metoprolol tartrate  25 mg Oral BID    predniSONE  40 mg Oral Daily    bumetanide  1 mg IntraVENous BID    digoxin  125 mcg Oral Daily    sodium chloride flush  5-40 mL IntraVENous 2 times per day    heparin flush  1 mL IntraVENous 2 times per day    benzoin compound   Topical Nightly    insulin glargine  10 Units SubCUTAneous Nightly    insulin lispro  0-8 Units SubCUTAneous TID WC    insulin lispro  0-4 Units SubCUTAneous Nightly    insulin lispro  3 Units SubCUTAneous TID WC    amiodarone  200 mg Oral Daily    aspirin EC  81 mg Oral Daily    apixaban  5 mg Oral BID    rOPINIRole  1 mg Oral TID    sucralfate  1 g Oral 4x Daily    pantoprazole  40 mg Oral QAM    montelukast  10 mg Oral Nightly    miconazole  1 each Topical BID    ipratropium-albuterol  1 vial Inhalation 4x Daily    carbidopa-levodopa  2 tablet Oral TID    budesonide  0.5 mg Nebulization BID    atorvastatin  20 mg Oral Nightly    sodium chloride flush  5-40 mL IntraVENous 2 times per day    melatonin  5 mg Oral Nightly     Continuous Infusions:   sodium chloride      dextrose       PRN Meds:sodium chloride flush, sodium chloride, heparin flush, mirtazapine, LORazepam, glucose, dextrose bolus **OR** dextrose bolus, glucagon (rDNA), dextrose, polyethylene glycol, acetaminophen **OR** acetaminophen    I/O last 3 completed shifts: In: 600 [P.O.:600]  Out: 2 [Urine:2]  I/O this shift:  In: 240 [P.O.:240]  Out: -       Objective:      Physical Exam:   /62   Pulse 95   Temp 97.3 °F (36.3 °C) (Infrared)   Resp 20   Ht 5' (1.524 m)   Wt 232 lb 7 oz (105.4 kg)   SpO2 96%   BMI 45.39 kg/m²   Appearance: Awake, alert and oriented x 3, no acute respiratory distress  Skin: Intact, no rash  Head: Normocephalic, atraumatic  Eyes: EOMI, no conjunctival erythema  ENMT: No pharyngeal erythema, MMM, no rhinorrhea, no dentures  Neck: Supple, no carotid bruits  Lungs: Decreased BS B/L, no wheezing  Cardiac: IRRR, no murmurs apparent  Abdomen: Soft, nontender, +bowel sounds  Extremities: Moves all extremities x 4, no lower extremity edema  Neurologic: No focal motor deficits apparent, normal mood and affect, alert and oriented x 3    Imaging  XR CHEST PORTABLE   Final Result   1. Small right pleural effusion. 2.  No other significant interval change. IR GUIDED THORACENTESIS PLEURAL   Final Result   Successful ultrasound guided thoracentesis. XR CHEST 1 VIEW   Final Result   1. There is no right pneumothorax status post right thoracentesis   2. Right lung base atelectasis   3. The left lung is clear. XR CHEST PORTABLE   Final Result   1. Cardiomegaly with findings of CHF   2. Moderate size right pleural effusion   3.  Trace left pleural effusion             Lab Review   Lab Results   Component Value Date    WBC 9.0 08/25/2022    HGB 9.0 (L) 08/25/2022    HCT 31.5 (L) 08/25/2022    MCV 98.4 08/25/2022     08/25/2022     Lab Results   Component Value Date    CREATININE 0.9 08/29/2022    BUN 20 08/29/2022     08/29/2022    K 3.9 08/29/2022     08/29/2022    CO2 34 (H) 08/29/2022     Lab Results   Component Value Date    TSH 4.580 (H) 07/06/2022     Lab Results   Component Value Date    LABA1C 6.1 (H) 08/24/2022     No results found for: EAG    Lab Results   Component Value Date    DIGOXIN 2.0 07/06/2022     Telemetry personally reviewed (8/29/2022): AF, rate 90's-110    Cardiac catheterization: 1/11/22 (Dr. Martín Buitrago)  Left main: 0%  stenosis  LAD: 50 %  stenosis  Circumflex: 50 %   stenosis  RCA: Co dominant. 0 %  stenosis  LV angio: 75-80%  ejection fraction    Echocardiogram: 7/7/22 (Dr. Nathalie Astudillo)   Technically difficult study - limited visualization. Micro-bubble contrast injected to enhance left ventricular visualization. Normal left ventricular size and systolic function. Ejection fraction is visually estimated at 70-75%. Indeterminate diastolic function. No regional wall motion abnormalities seen. Mild left ventricular concentric hypertrophy noted. Moderately dilated right ventricle with reduced function. Biatrial dilation. Mild tricuspid regurgitation. RVSP is at least 60 mmHg. Prominent pericardial fat pad. No definitive evidence of pericardial effusion. Assessment:     1. Atrial fibrillation with episodes of RVR / persistent atrial fibrillation -- multiple prior CVN's and recurrence of AF  2. Acute hypoxic respiratory failure: acute decompensated heart failure, COPD  3. Acute on chronic diastolic congestive heart failure  4. Bilateral pleural effusion -- s/p right sided thoracentesis (900 cc) on 8/26/22  5. CAD: Nonobstructive involving LAD and left circumflex artery  6. Mitral valve regurgitation:   7. Tricuspid valve regurgitation: Mild  8. Pulmonary hypertension  9. Hypertension: Controlled  10. Hyperlipidemia  11. Type 2 diabetes mellitus  12. Chronic anemia - most recent Hgb 9.0  13. Chronic OAC with eliquis  14, SERENA -- noncompliant with treatment  15.  Tobacco abuse  16, BMI 45.4    Recommendations:     Monitor BMP and I/O's (inaccurate) with ongoing diuresis -- currently IV bumex 1 mg BID  Will increase BB dose and switch to Toprol XL (50 mg BID)  Continue eliquis 5 mg BID  Check digoxin level  Aggressive risk factor modifications / treatment of SERENA as indicated  Care per pulmonary  Monitor telemetry    Greater than 35 minutes was spent counseling the patient, reviewing the rationale for the above recommendations and reviewing the patient's current medication list, problem list and results of all previously ordered testing.     Chloé Hernandez MD  [facility]) Cardiology

## 2022-08-29 NOTE — PROGRESS NOTES
Occupational Therapy  OCCUPATIONAL THERAPY INITIAL EVALUATION    [facility]  [address], [address]         Date:2022                                                   Patient Name: Junior Sanchez     MRN: [id number]     : 1950     Room: [address] [place] [address]       Evaluating OT: Leatha Matute OTR/L; [id number]       Referring Provider and Orders/Date:   OT eval and treat  Start:  22 1430,   End:  22 1430,   ONE TIME,   Standing Count:  1 Occurrences,   R         La Ortiz DO        Diagnosis:   1. Acute respiratory failure with hypoxia ([address]    2.  Atrial fibrillation with RVR ([address]         Surgery: none      Pertinent Medical History: PD       Past Medical History:   Diagnosis Date    A-fib ([address]     Acute on chronic congestive heart failure (HCC)     Anxiety     Asthma     CAD (coronary artery disease) 2016    Cancer ([address]  breast ca 2006    right lumpectomy    Chronic kidney disease     nephrolithiasis    Depression     Diabetes mellitus ([address]     H/O mammogram     Hx MRSA infection     toe infection 2012    Hyperlipidemia     Hypertension     Lateral epicondylitis     SERENA on CPAP     Parkinson's disease ([address]     Tubal ligation status           Past Surgical History:   Procedure Laterality Date    BREAST LUMPECTOMY      BREAST REDUCTION SURGERY      CARDIAC CATHETERIZATION  2014    Dr. Lien Fernández  2022    Dr. Zuleyka Gutierrez  7/29/15    CT GUIDED CHEST TUBE  2022    CT GUIDED CHEST TUBE 2022 Carlene Singh MD [facility]    ENDOSCOPY, COLON, DIAGNOSTIC  7/19/15    GALLBLADDER SURGERY      LUMBAR LAMINECTOMY      TOE AMPUTATION      TONSILLECTOMY      UPPER GASTROINTESTINAL ENDOSCOPY      UPPER GASTROINTESTINAL ENDOSCOPY N/A 2022    EGD ESOPHAGOGASTRODUODENOSCOPY performed by Jonathan Flores MD at [facility] ENDOSCOPY       Precautions:  Fall Risk, 2L Recommended placement: subacute    Assessment of current deficits     [x] Functional mobility  [x]ADLs  [x] Strength               []Cognition     [x] Functional transfers   [x] IADLs         [x] Safety Awareness   [x]Endurance     [] Fine Coordination              [x] Balance      [] Vision/perception   []Sensation      [x]Gross Motor Coordination  [] ROM  [] Delirium                   [] Motor Control     OT PLAN OF CARE   OT POC based on physician orders, patient diagnosis and results of clinical assessment    Frequency/Duration 1-3 days/wk for 2 weeks PRN   Specific OT Treatment Interventions to include:   * Instruction/training on adapted ADL techniques and AE recommendations to increase functional independence within precautions       * Training on energy conservation strategies, correct breathing pattern and techniques to improve independence/tolerance for self-care routine  * Functional transfer/mobility training/DME recommendations for increased independence, safety, and fall prevention  * Patient/Family education to increase follow through with safety techniques and functional independence  * Recommendation of environmental modifications for increased safety with functional transfers/mobility and ADLs  * Therapeutic exercise to improve motor endurance, ROM, and functional strength for ADLs/functional transfers  * Therapeutic activities to facilitate/challenge dynamic balance, stand tolerance for increased safety and independence with ADLs  * Therapeutic activities to facilitate gross/fine motor skills for increased independence with ADLs  * Neuro-muscular re-education: facilitation of righting/equilibrium reactions, midline orientation, scapular stability/mobility, normalization of muscle tone, and facilitation of volitional active controled movement  * Positioning to improve skin integrity, interaction with environment and functional independence     Recommended Adaptive Equipment/DME:  TBD      Home Unable to attempt a 3rd time with lunch arriving. Max A x 2 to boost in bed and position for safety with meal.   Supine to sit: min Assist   Sit to supine: Minimal Assist    Functional Transfers  NT due to BM x 2  Minimal Assist    Functional Mobility  NT on eval due to toileting and lunch arriving  Minimal Assist    Balance Sitting:     Static:  NT    Dynamic:NT  Standing: NT  Sitting:     Static:  fair+    Dynamic:fair  Standing: fair-   Activity Tolerance Vitals with activity:2L with O2 dropping with bed mobility and attempts to EOB to 78%, returned to supine with 2min recovery to 92%; 97% at end of session. Increase standing tolerance for >1min with stable vital signs for carry over into toileting, functional tranfers and indep in ADLs   Visual/  Perceptual Glasses: not Present; WFL    Reports change in vision since admission: No     NA     Hand Dominance  [x] Right  [] Left    AROM (PROM) Strength Additional Info:  Goal:   RUE  WFL 4/5 good  and fair FMC/dexterity noted during ADL tasks-tremor  Opposition [x] Intact [] Impaired  Finger to nose [x] Intact [] Impaired 4+/5MMT generally for carry over into self care, functional transfers and functional mobility with AD. LUE WFL 4/5 good  and fair FMC/dexterity noted during ADL tasks-tremor  Opposition [x] Intact [] Impaired  Finger to nose [x] Intact [] Impaired 5/5MMT generally for carry over into self care, functional transfers and functional mobility with AD. Hearing: WFL   Sensation:  No c/o numbness or tingling   Tone: WFL   Edema: none    Comments: Upon arrival patient supine in bed with motivation for therapy. Pt required max A for most UB ADLs and dep A LB ADLs tasks. Limited with frequent need to toileting. The biggest barriers reflect that of functional transfers, functional mobility, UB/LB ADLs, cognition, activity tolerance, balance, safety and strengthening.  At end of session, patient supine with call light and phone within reach, all lines and tubes intact. Overall patient demonstrated decreased independence and safety during completion of ADL/functional transfer/mobility tasks compared to PLOF. Nursing updated on pt position and status following OT eval. Pt would benefit from continued skilled OT to increase safety and independence with completion of ADL/IADL tasks for functional independence and quality of life. Treatment: OT treatment provided this date includes:  Instruction, education and training on safe facilitation and adapted techniques for completion of ADLs. These include neuromuscular reeducation to facilitate balance/righting reactions, proper positioning/alignment to improve interaction with environment and overall function and on adapted techniques/work simplification for completion of ADLs. Education provided on hand/feet placement with bed rails and body mechanics for O2 levels. Cues for energy conservation and safety for in the home at DC, including modifications and DME. Extended time to complete all tasks, including skilled monitoring of patient's response during treatment session and vital signs. Prior to and at the end of session, environmental modifications / line management completed for patients safety and efficiency of treatment session. See above for further details. Rehab Potential: Good  for established goals     Patient / Family Goal: O2  management      Patient and/or family were instructed on functional diagnosis, prognosis/goals and OT plan of care. Demonstrated fair understanding. Eval Complexity: Low  History: Brief review of medical records and additional review of physical, cognitive, or psychosocial history related to current functional performance  Exam: 3+ performance deficits  Assistance/Modification: Mod assistance or modifications required to perform tasks. May have comorbidities that affect occupational performance.     Time In: 1135  Time Out: 1208  Total Treatment Time: 13    Min Units OT Eval Low 97165  x  1   OT Eval Medium 27177      OT Eval High 24631      OT Re-Eval [id number]       Therapeutic Ex 18644       Therapeutic Activities 52796       ADL/Self Care 04521  13 1    Orthotic Management 29586       Manual [id number]     Neuro Re-Ed 62537       Non-Billable Time          Evaluation Time additionally includes thorough review of current medical information, gathering information on past medical history/social history and prior level of function, interpretation of standardized testing/informal observation of tasks, assessment of data and development of plan of care and goals.             Modesto Mcpherson OTR/L; [id number]

## 2022-08-29 NOTE — PLAN OF CARE
Problem: ABCDS Injury Assessment  Goal: Absence of physical injury  Outcome: Progressing  Flowsheets (Taken 8/29/2022 1023 by Gaby Solares RN)  Absence of Physical Injury: Implement safety measures based on patient assessment     Problem: Safety - Adult  Goal: Free from fall injury  Outcome: Progressing  Flowsheets (Taken 8/29/2022 1023 by Gaby Solares RN)  Free From Fall Injury:   Based on caregiver fall risk screen, instruct family/caregiver to ask for assistance with transferring infant if caregiver noted to have fall risk factors   Instruct family/caregiver on patient safety     Problem: Skin/Tissue Integrity  Goal: Absence of new skin breakdown  Description: 1. Monitor for areas of redness and/or skin breakdown  2. Assess vascular access sites hourly  3. Every 4-6 hours minimum:  Change oxygen saturation probe site  4. Every 4-6 hours:  If on nasal continuous positive airway pressure, respiratory therapy assess nares and determine need for appliance change or resting period.   Outcome: Progressing

## 2022-08-30 PROBLEM — I48.0 PAROXYSMAL ATRIAL FIBRILLATION (HCC): Status: ACTIVE | Noted: 2022-04-14

## 2022-08-30 LAB
AFB CULTURE (MYCOBACTERIA): NORMAL
AFB SMEAR: NORMAL
ANION GAP SERPL CALCULATED.3IONS-SCNC: 8 MMOL/L (ref 7–16)
BUN BLDV-MCNC: 26 MG/DL (ref 6–23)
CALCIUM SERPL-MCNC: 9.2 MG/DL (ref 8.6–10.2)
CHLORIDE BLD-SCNC: 92 MMOL/L (ref 98–107)
CO2: 43 MMOL/L (ref 22–29)
CREAT SERPL-MCNC: 0.9 MG/DL (ref 0.5–1)
DIGOXIN LEVEL: 0.6 NG/ML (ref 0.8–2)
GFR AFRICAN AMERICAN: >60
GFR NON-AFRICAN AMERICAN: >60 ML/MIN/1.73
GLUCOSE BLD-MCNC: 205 MG/DL (ref 74–99)
METER GLUCOSE: 116 MG/DL (ref 74–99)
METER GLUCOSE: 222 MG/DL (ref 74–99)
METER GLUCOSE: 248 MG/DL (ref 74–99)
METER GLUCOSE: 248 MG/DL (ref 74–99)
POTASSIUM SERPL-SCNC: 4 MMOL/L (ref 3.5–5)
SODIUM BLD-SCNC: 143 MMOL/L (ref 132–146)

## 2022-08-30 PROCEDURE — 6370000000 HC RX 637 (ALT 250 FOR IP): Performed by: FAMILY MEDICINE

## 2022-08-30 PROCEDURE — 99233 SBSQ HOSP IP/OBS HIGH 50: CPT | Performed by: FAMILY MEDICINE

## 2022-08-30 PROCEDURE — 94660 CPAP INITIATION&MGMT: CPT

## 2022-08-30 PROCEDURE — 94640 AIRWAY INHALATION TREATMENT: CPT

## 2022-08-30 PROCEDURE — 2700000000 HC OXYGEN THERAPY PER DAY

## 2022-08-30 PROCEDURE — 2500000003 HC RX 250 WO HCPCS: Performed by: INTERNAL MEDICINE

## 2022-08-30 PROCEDURE — 6360000002 HC RX W HCPCS: Performed by: FAMILY MEDICINE

## 2022-08-30 PROCEDURE — 80162 ASSAY OF DIGOXIN TOTAL: CPT

## 2022-08-30 PROCEDURE — 2060000000 HC ICU INTERMEDIATE R&B

## 2022-08-30 PROCEDURE — 6370000000 HC RX 637 (ALT 250 FOR IP): Performed by: INTERNAL MEDICINE

## 2022-08-30 PROCEDURE — 80048 BASIC METABOLIC PNL TOTAL CA: CPT

## 2022-08-30 PROCEDURE — 97535 SELF CARE MNGMENT TRAINING: CPT

## 2022-08-30 PROCEDURE — 36415 COLL VENOUS BLD VENIPUNCTURE: CPT

## 2022-08-30 PROCEDURE — 82962 GLUCOSE BLOOD TEST: CPT

## 2022-08-30 PROCEDURE — 2580000003 HC RX 258: Performed by: FAMILY MEDICINE

## 2022-08-30 PROCEDURE — 97530 THERAPEUTIC ACTIVITIES: CPT

## 2022-08-30 PROCEDURE — 99233 SBSQ HOSP IP/OBS HIGH 50: CPT | Performed by: INTERNAL MEDICINE

## 2022-08-30 RX ORDER — MAGNESIUM HYDROXIDE/ALUMINUM HYDROXICE/SIMETHICONE 120; 1200; 1200 MG/30ML; MG/30ML; MG/30ML
30 SUSPENSION ORAL ONCE
Status: COMPLETED | OUTPATIENT
Start: 2022-08-30 | End: 2022-08-30

## 2022-08-30 RX ADMIN — INSULIN LISPRO 2 UNITS: 100 INJECTION, SOLUTION INTRAVENOUS; SUBCUTANEOUS at 16:54

## 2022-08-30 RX ADMIN — INSULIN GLARGINE 10 UNITS: 100 INJECTION, SOLUTION SUBCUTANEOUS at 20:33

## 2022-08-30 RX ADMIN — BUDESONIDE 500 MCG: 0.5 SUSPENSION RESPIRATORY (INHALATION) at 06:07

## 2022-08-30 RX ADMIN — INSULIN LISPRO 3 UNITS: 100 INJECTION, SOLUTION INTRAVENOUS; SUBCUTANEOUS at 16:55

## 2022-08-30 RX ADMIN — CARBIDOPA AND LEVODOPA 2 TABLET: 25; 100 TABLET, EXTENDED RELEASE ORAL at 09:03

## 2022-08-30 RX ADMIN — ALUMINUM HYDROXIDE, MAGNESIUM HYDROXIDE, AND SIMETHICONE 30 ML: 200; 200; 20 SUSPENSION ORAL at 14:01

## 2022-08-30 RX ADMIN — INSULIN LISPRO 3 UNITS: 100 INJECTION, SOLUTION INTRAVENOUS; SUBCUTANEOUS at 08:58

## 2022-08-30 RX ADMIN — INSULIN LISPRO 3 UNITS: 100 INJECTION, SOLUTION INTRAVENOUS; SUBCUTANEOUS at 12:05

## 2022-08-30 RX ADMIN — PANTOPRAZOLE SODIUM 40 MG: 40 TABLET, DELAYED RELEASE ORAL at 08:58

## 2022-08-30 RX ADMIN — SUCRALFATE 1 G: 1 TABLET ORAL at 08:57

## 2022-08-30 RX ADMIN — ASPIRIN 81 MG: 81 TABLET, COATED ORAL at 08:58

## 2022-08-30 RX ADMIN — ATORVASTATIN CALCIUM 20 MG: 20 TABLET, FILM COATED ORAL at 20:24

## 2022-08-30 RX ADMIN — HEPARIN 100 UNITS: 100 SYRINGE at 20:25

## 2022-08-30 RX ADMIN — ROPINIROLE HYDROCHLORIDE 1 MG: 1 TABLET, FILM COATED ORAL at 20:25

## 2022-08-30 RX ADMIN — INSULIN LISPRO 2 UNITS: 100 INJECTION, SOLUTION INTRAVENOUS; SUBCUTANEOUS at 12:05

## 2022-08-30 RX ADMIN — IPRATROPIUM BROMIDE AND ALBUTEROL SULFATE 3 ML: .5; 2.5 SOLUTION RESPIRATORY (INHALATION) at 13:15

## 2022-08-30 RX ADMIN — ANTI-FUNGAL POWDER MICONAZOLE NITRATE TALC FREE 1 EACH: 1.42 POWDER TOPICAL at 09:04

## 2022-08-30 RX ADMIN — Medication 10 ML: at 08:57

## 2022-08-30 RX ADMIN — ANTI-FUNGAL POWDER MICONAZOLE NITRATE TALC FREE 1 EACH: 1.42 POWDER TOPICAL at 20:28

## 2022-08-30 RX ADMIN — CARBIDOPA AND LEVODOPA 2 TABLET: 25; 100 TABLET, EXTENDED RELEASE ORAL at 14:01

## 2022-08-30 RX ADMIN — SUCRALFATE 1 G: 1 TABLET ORAL at 20:32

## 2022-08-30 RX ADMIN — ROPINIROLE HYDROCHLORIDE 1 MG: 1 TABLET, FILM COATED ORAL at 14:01

## 2022-08-30 RX ADMIN — METOPROLOL SUCCINATE 50 MG: 50 TABLET, EXTENDED RELEASE ORAL at 08:58

## 2022-08-30 RX ADMIN — SUCRALFATE 1 G: 1 TABLET ORAL at 12:04

## 2022-08-30 RX ADMIN — MONTELUKAST 10 MG: 10 TABLET, FILM COATED ORAL at 20:24

## 2022-08-30 RX ADMIN — APIXABAN 5 MG: 5 TABLET, FILM COATED ORAL at 20:24

## 2022-08-30 RX ADMIN — BUMETANIDE 1 MG: 0.25 INJECTION, SOLUTION INTRAMUSCULAR; INTRAVENOUS at 20:25

## 2022-08-30 RX ADMIN — METOPROLOL SUCCINATE 50 MG: 50 TABLET, EXTENDED RELEASE ORAL at 20:24

## 2022-08-30 RX ADMIN — ROPINIROLE HYDROCHLORIDE 1 MG: 1 TABLET, FILM COATED ORAL at 08:57

## 2022-08-30 RX ADMIN — ACETAMINOPHEN 650 MG: 325 TABLET ORAL at 14:01

## 2022-08-30 RX ADMIN — SUCRALFATE 1 G: 1 TABLET ORAL at 16:56

## 2022-08-30 RX ADMIN — IPRATROPIUM BROMIDE AND ALBUTEROL SULFATE 3 ML: .5; 2.5 SOLUTION RESPIRATORY (INHALATION) at 06:06

## 2022-08-30 RX ADMIN — LORAZEPAM 0.5 MG: 0.5 TABLET ORAL at 01:17

## 2022-08-30 RX ADMIN — CARBIDOPA AND LEVODOPA 2 TABLET: 25; 100 TABLET, EXTENDED RELEASE ORAL at 20:24

## 2022-08-30 RX ADMIN — Medication 5 MG: at 20:24

## 2022-08-30 RX ADMIN — APIXABAN 5 MG: 5 TABLET, FILM COATED ORAL at 08:58

## 2022-08-30 RX ADMIN — BUMETANIDE 1 MG: 0.25 INJECTION, SOLUTION INTRAMUSCULAR; INTRAVENOUS at 08:57

## 2022-08-30 RX ADMIN — HEPARIN 100 UNITS: 100 SYRINGE at 08:57

## 2022-08-30 RX ADMIN — Medication 10 ML: at 20:33

## 2022-08-30 RX ADMIN — IPRATROPIUM BROMIDE AND ALBUTEROL SULFATE 3 ML: .5; 2.5 SOLUTION RESPIRATORY (INHALATION) at 09:52

## 2022-08-30 RX ADMIN — PREDNISONE 40 MG: 20 TABLET ORAL at 08:57

## 2022-08-30 RX ADMIN — Medication 10 ML: at 20:29

## 2022-08-30 RX ADMIN — DIGOXIN 125 MCG: 125 TABLET ORAL at 08:58

## 2022-08-30 ASSESSMENT — PAIN SCALES - GENERAL
PAINLEVEL_OUTOF10: 0
PAINLEVEL_OUTOF10: 8
PAINLEVEL_OUTOF10: 0

## 2022-08-30 ASSESSMENT — PAIN SCALES - WONG BAKER: WONGBAKER_NUMERICALRESPONSE: 0

## 2022-08-30 NOTE — PROGRESS NOTES
Resp 20   Ht 5' (1.524 m)   Wt 227 lb 8 oz (103.2 kg)   SpO2 100%   BMI 44.43 kg/m²     General Appearance: alert and oriented to person, place and time and in no acute distress  Skin: warm and dry  Head: normocephalic and atraumatic  Eyes: pupils equal, round, and reactive to light, extraocular eye movements intact, conjunctivae normal  Neck: neck supple and non tender without mass   Pulmonary/Chest: clear to auscultation bilaterally- no wheezes, rales or rhonchi, normal air movement, no respiratory distress  Cardiovascular: normal rate, normal S1 and S2 and no carotid bruits  Abdomen: soft, non-tender, non-distended, normal bowel sounds, no masses or organomegaly  Extremities: no cyanosis, no clubbing and no edema  Neurologic: no cranial nerve deficit and speech normal        Recent Labs     08/28/22  0615 08/29/22  0615    143   K 4.0 3.9    100   CO2 35* 34*   BUN 18 20   CREATININE 1.0 0.9   GLUCOSE 79 146*   CALCIUM 8.2* 8.1*       No results for input(s): WBC, RBC, HGB, HCT, MCV, MCH, MCHC, RDW, PLT, MPV in the last 72 hours. Radiology:   XR CHEST PORTABLE    Result Date: 8/29/2022  EXAMINATION: ONE XRAY VIEW OF THE CHEST 8/29/2022 6:34 am COMPARISON: 08/26/2022 HISTORY: ORDERING SYSTEM PROVIDED HISTORY: SOB TECHNOLOGIST PROVIDED HISTORY: Reason for exam:->SOB FINDINGS: Heart is upper limits of normal in size. There is reaccumulation of a small right pleural effusion which appears partially loculated in the major fissure. No pneumothorax. No new alveolar consolidation. Osseous structures are stable. Unchanged postop change in the right chest wall soft tissues. 1.  Small right pleural effusion. 2.  No other significant interval change.       Assessment:    Principal Problem:    Acute respiratory failure with hypoxia and hypercapnia (HCC)  Active Problems:    Acute decompensated heart failure (HCC)    Encephalopathy acute    Pleural effusion    Acute confusion    Chronic diastolic

## 2022-08-30 NOTE — PROGRESS NOTES
OCCUPATIONAL THERAPY TREATMENT NOTE     [facility]   [address]        Date:2022  Patient Name: Alex Burch  MRN: [id number]  : 1950  Room: [address] [place] [address]     Evaluating OT: Alexis Martinez, OTR/L; [id number]        Referring Provider and Orders/Date:   OT eval and treat  Start:  22 1430,   End:  22 1430,   ONE TIME,   Standing Count:  1 Occurrences,   R         La Ortiz DO         Diagnosis:   1. Acute respiratory failure with hypoxia ([address]    2.  Atrial fibrillation with RVR ([address]          Surgery: none      Pertinent Medical History: PD       Past Medical History        Past Medical History:   Diagnosis Date    A-fib ([address]      Acute on chronic congestive heart failure (HCC)      Anxiety      Asthma      CAD (coronary artery disease) 2016    Cancer ([address]  breast ca 2006     right lumpectomy    Chronic kidney disease       nephrolithiasis    Depression      Diabetes mellitus ([address]      H/O mammogram      Hx MRSA infection       toe infection 2012    Hyperlipidemia      Hypertension      Lateral epicondylitis      SERENA on CPAP      Parkinson's disease ([address]      Tubal ligation status               Past Surgical History         Past Surgical History:   Procedure Laterality Date    BREAST LUMPECTOMY        BREAST REDUCTION SURGERY        CARDIAC CATHETERIZATION   2014     Dr. Shahbaz Conner   2022     Dr. Chryl Sacks   7/29/15    CT GUIDED CHEST TUBE   2022     CT GUIDED CHEST TUBE 2022 Cee Willingham MD [facility]    ENDOSCOPY, COLON, DIAGNOSTIC   7/19/15    GALLBLADDER SURGERY        LUMBAR LAMINECTOMY        TOE AMPUTATION        TONSILLECTOMY        UPPER GASTROINTESTINAL ENDOSCOPY        UPPER GASTROINTESTINAL ENDOSCOPY N/A 2022     EGD ESOPHAGOGASTRODUODENOSCOPY performed by Marianna Hall MD at [address] [place] [address] Precautions:  Fall Risk, 2L    Recommended placement: subacute    Assessment of current deficits     [x] Functional mobility           [x]ADLs           [x] Strength                  []Cognition     [x] Functional transfers         [x] IADLs         [x] Safety Awareness   [x]Endurance     [] Fine Coordination                        [x] Balance      [] Vision/perception   []Sensation       [x]Gross Motor Coordination            [] ROM           [] Delirium                   [] Motor Control      OT PLAN OF CARE   OT POC based on physician orders, patient diagnosis and results of clinical assessment     Frequency/Duration 1-3 days/wk for 2 weeks PRN   Specific OT Treatment Interventions to include:   * Instruction/training on adapted ADL techniques and AE recommendations to increase functional independence within precautions       * Training on energy conservation strategies, correct breathing pattern and techniques to improve independence/tolerance for self-care routine  * Functional transfer/mobility training/DME recommendations for increased independence, safety, and fall prevention  * Patient/Family education to increase follow through with safety techniques and functional independence  * Recommendation of environmental modifications for increased safety with functional transfers/mobility and ADLs  * Therapeutic exercise to improve motor endurance, ROM, and functional strength for ADLs/functional transfers  * Therapeutic activities to facilitate/challenge dynamic balance, stand tolerance for increased safety and independence with ADLs  * Therapeutic activities to facilitate gross/fine motor skills for increased independence with ADLs  * Neuro-muscular re-education: facilitation of righting/equilibrium reactions, midline orientation, scapular stability/mobility, normalization of muscle tone, and facilitation of volitional active controled movement  * Positioning to improve skin integrity, interaction with environment and functional independence      Recommended Adaptive Equipment/DME:  TBD       Home Living: Pt is a long term resident of a SNF and plans to return at [place]. Bathroom setup: roll in shower chair; grab bars by the toilet and 3:1 over top for safety. DME owned: wc and walker      Prior Level of Function: assist with ADLs , dep with IADLs; ambulated with wc mostly, but transferring with ww and assist   Driving: no   Occupation: none   Enjoys: reading, socializing, phone-games     Pain Level: none  Cognition: A&O: 4/4; Follows 3 step directions              Memory:  Intact              Sequencing:  Intact              Problem solving:  fair              Judgement/safety:  fair   AM-[facility] Daily Activity Inpatient   How much help for putting on and taking off regular lower body clothing?: Total  How much help for Bathing?: A Lot  How much help for Toileting?: Total  How much help for putting on and taking off regular upper body clothing?: A Lot  How much help for taking care of personal grooming?: A Little  How much help for eating meals?: A Little  AM-[facility] Inpatient Daily Activity Raw Score: 12  AM-[facility] Inpatient ADL T-Scale Score : 30.6  ADL Inpatient CMS 0-100% Score: 66.57  ADL Inpatient CMS G-Code Modifier : CL     Functional Assessment:      Initial Eval Status  Date: 8/29/2022   Treatment Status  Date: 8/30/22 STGs = LTGs  Time frame: 10-14 days   Feeding Minimal Assist overall with a tremor and assist to open items and prep on tray SBA to bring cup to mouth and take drink  indep   Grooming Minimal Assist with hair comb in back. Face and hand wash with set up. Declining oral care with lunch arriving. SBA pt seated to wash face/hands  MIN A over all to comb hair.       Supervision    UB Dressing Maximal Assist from supine level MIN A to emmy/doff hospital gown  Moderate Assist    LB Dressing Dependent with socks from supine level MAX A to emmy/doff socks seated in chair  Maximal Assist    Bathing Max A from supine level MOD A to complete sponge bathing requiring assist to bathe B LE, buttocks; Mod  Assist    Toileting Dependent from supine with bed pan x 2 during session. Pt with frequent BM's the last 2 days per report. MAX A pt requiring assist with hygiene and clothing management  Maximal Assist    Bed Mobility  Rolling in bed to right and left sides with mod A. Attempted EOB x 2 with session and max A suspected, but with need for bed pan/BM with both attempts. Unable to attempt a 3rd time with lunch arriving. Max A x 2 to boost in bed and position for safety with meal.  Supine>sit MOD A pt requiring assist with trunk control v/c's for technique  Supine to sit: min Assist   Sit to supine: Minimal Assist    Functional Transfers  NT due to BM x 2 MOD A sit<>stand from EOB/chair v/c's for hand placement   Minimal Assist    Functional Mobility  NT on eval due to toileting and lunch arriving MIN A less than house hold distances with HHA  Minimal Assist    Balance Sitting:     Static:  NT    Dynamic:NT  Standing: NT Sitting:   Static: supervision at EOB   Dynamic: MIN A   Standing: MIN A   Sitting:     Static:  fair+    Dynamic:fair  Standing: fair-   Activity Tolerance Vitals with activity:2L with O2 dropping with bed mobility and attempts to EOB to 78%, returned to supine with 2min recovery to 92%; 97% at end of session. Fair- completed light ADL tasks and functional transfers  Increase standing tolerance for >1min with stable vital signs for carry over into toileting, functional tranfers and indep in ADLs   Visual/  Perceptual Glasses: not Present; WFL     Reports change in vision since admission: No      NA      Comments: Upon arrival pt supine in bed, agreeable to therapy session. Pt educated with regards to bed mobility, functional transfers, functional mobility, hand placement, LE/UE dressing, grooming tasks, bathing tasks, self feeding, importance of increase activity.  At end of session pt seated in chair,  all lines and tubes intact, call light within reach. Pt has made fair  progress towards set goals.    Continue with current plan of care      Treatment Time In:1010            Treatment Time Out: 1100                Treatment Charges: Mins Units   Ther Ex  [id number]     Manual Therapy [id number] [facility]     Thera Activities 40243 20 1   ADL/Home Mgt 41241 30 2   Neuro Re-ed [id number]     Group Therapy      Orthotic manage/training  [id number]     Non-Billable Time     Total Timed Treatment [address] [place]/L [id number]

## 2022-08-30 NOTE — CARE COORDINATION
8/30/2022 1430 CM Note:COVID Negative 8/25/2022. CM for transition of care needs at d/c. Pt's back off the Cardizem drip and on oral meds. She may be ready for d/c tomorrow. Pt resides at [address] [place] [address] and will return there. Per [facility] she is a long term resident and a bed hold, if skilled need at d/c they will submit for precert but won't have to wait for auth. Will need signed [place] and the Covid testing from today 8/25 will be sufficient unless hospitalized for extended period of time. CM will follow.   General Lei ROTHMAN

## 2022-08-30 NOTE — PROGRESS NOTES
Patient is seen in follow-up for atrial fibrillation    Date of service: 8/30/2022    Subjective:   No new overnight cardiac complaints. No chest pain or palpitations. +SOB, but respiratory status improved since admission per patient. AF at rate 80's-110 on telemetry. I/O's inaccurate (urine output more than documented).     ROS:  Cardiac: As per HPI  General: No fever, chills  Pulmonary: As per HPI  HEENT: No visual disturbances, difficult swallowing  GI: No nausea, vomiting  : No dysuria, hematuria  Endocrine: No thyroid disease, +DM  Musculoskeletal: HDZ x 4, no focal motor deficits  Skin: Intact, no rashes  Neuro: No headache, seizures  Psych: Currently with no depression, anxiety    Scheduled Meds:   metoprolol succinate  50 mg Oral BID    predniSONE  40 mg Oral Daily    bumetanide  1 mg IntraVENous BID    digoxin  125 mcg Oral Daily    sodium chloride flush  5-40 mL IntraVENous 2 times per day    heparin flush  1 mL IntraVENous 2 times per day    benzoin compound   Topical Nightly    insulin glargine  10 Units SubCUTAneous Nightly    insulin lispro  0-8 Units SubCUTAneous TID WC    insulin lispro  0-4 Units SubCUTAneous Nightly    insulin lispro  3 Units SubCUTAneous TID WC    aspirin EC  81 mg Oral Daily    apixaban  5 mg Oral BID    rOPINIRole  1 mg Oral TID    sucralfate  1 g Oral 4x Daily    pantoprazole  40 mg Oral QAM    montelukast  10 mg Oral Nightly    miconazole  1 each Topical BID    ipratropium-albuterol  1 vial Inhalation 4x Daily    carbidopa-levodopa  2 tablet Oral TID    budesonide  0.5 mg Nebulization BID    atorvastatin  20 mg Oral Nightly    sodium chloride flush  5-40 mL IntraVENous 2 times per day    melatonin  5 mg Oral Nightly     Continuous Infusions:   sodium chloride      dextrose       PRN Meds:sodium chloride flush, sodium chloride, heparin flush, mirtazapine, LORazepam, glucose, dextrose bolus **OR** dextrose bolus, glucagon (rDNA), dextrose, polyethylene glycol, acetaminophen **OR** acetaminophen    I/O last 3 completed shifts: In: 720 [P.O.:720]  Out: -   No intake/output data recorded. Objective:      Physical Exam:   /89   Pulse (!) 105   Temp 97.6 °F (36.4 °C) (Oral)   Resp 18   Ht 5' (1.524 m)   Wt 227 lb 8 oz (103.2 kg)   SpO2 96%   BMI 44.43 kg/m²   Appearance: Awake, alert and oriented x 3, no acute respiratory distress  Skin: Intact, no rash  Head: Normocephalic, atraumatic  Eyes: EOMI, no conjunctival erythema  ENMT: No pharyngeal erythema, MMM, no rhinorrhea, no dentures  Neck: Supple, no carotid bruits  Lungs: Decreased BS B/L, no wheezing  Cardiac: IRRR, no murmurs apparent  Abdomen: Soft, nontender, +bowel sounds  Extremities: Moves all extremities x 4, no lower extremity edema  Neurologic: No focal motor deficits apparent, normal mood and affect, alert and oriented x 3    Imaging  XR CHEST PORTABLE   Final Result   1. Small right pleural effusion. 2.  No other significant interval change. IR GUIDED THORACENTESIS PLEURAL   Final Result   Successful ultrasound guided thoracentesis. XR CHEST 1 VIEW   Final Result   1. There is no right pneumothorax status post right thoracentesis   2. Right lung base atelectasis   3. The left lung is clear. XR CHEST PORTABLE   Final Result   1. Cardiomegaly with findings of CHF   2. Moderate size right pleural effusion   3.  Trace left pleural effusion             Lab Review   Lab Results   Component Value Date    WBC 9.0 08/25/2022    HGB 9.0 (L) 08/25/2022    HCT 31.5 (L) 08/25/2022    MCV 98.4 08/25/2022     08/25/2022     Lab Results   Component Value Date    CREATININE 0.9 08/29/2022    BUN 20 08/29/2022     08/29/2022    K 3.9 08/29/2022     08/29/2022    CO2 34 (H) 08/29/2022     Lab Results   Component Value Date    TSH 4.580 (H) 07/06/2022     Lab Results   Component Value Date    LABA1C 6.1 (H) 08/24/2022     No results found for: EAG    Lab Results   Component Value Date    DIGOXIN 0.6 (L) 08/30/2022     Telemetry personally reviewed (8/30/2022): AF, rate 90's-110    Cardiac catheterization: 1/11/22 (Dr. Vinicio Rawls)  Left main: 0%  stenosis  LAD: 50 %  stenosis  Circumflex: 50 %   stenosis  RCA: Co dominant. 0 %  stenosis  LV angio: 75-80%  ejection fraction    Echocardiogram: 7/7/22 (Dr. Jen Chan)   Technically difficult study - limited visualization. Micro-bubble contrast injected to enhance left ventricular visualization. Normal left ventricular size and systolic function. Ejection fraction is visually estimated at 70-75%. Indeterminate diastolic function. No regional wall motion abnormalities seen. Mild left ventricular concentric hypertrophy noted. Moderately dilated right ventricle with reduced function. Biatrial dilation. Mild tricuspid regurgitation. RVSP is at least 60 mmHg. Prominent pericardial fat pad. No definitive evidence of pericardial effusion. Assessment:     1. Atrial fibrillation with episodes of RVR / persistent atrial fibrillation -- multiple prior CVN's and recurrence of AF  2. Acute hypoxic respiratory failure: acute decompensated heart failure, COPD  3. Acute on chronic diastolic congestive heart failure  4. Bilateral pleural effusion -- s/p right sided thoracentesis (900 cc) on 8/26/22  5. CAD: Nonobstructive involving LAD and left circumflex artery  6. Mitral valve regurgitation:   7. Tricuspid valve regurgitation: Mild  8. Pulmonary hypertension  9. Hypertension: Controlled  10. Hyperlipidemia  11. Type 2 diabetes mellitus  12. Chronic anemia - most recent Hgb 9.0  13. Chronic OAC with eliquis  14, SERENA -- noncompliant with treatment  15.  Tobacco abuse  16, BMI 45.4    Recommendations:     Monitor BMP and I/O's (inaccurate) with ongoing diuresis -- currently IV bumex 1 mg BID; will order BMP  Increased BB dose and switched to Toprol XL (50 mg BID) last evening -- continue to up-titrate BB dose as needed  Continue eliquis 5 mg BID  Checked digoxin level -- 0.6  Aggressive risk factor modifications / treatment of SERENA as indicated  Care per pulmonary  Monitor telemetry    Greater than 35 minutes was spent counseling the patient, reviewing the rationale for the above recommendations and reviewing the patient's current medication list, problem list and results of all previously ordered testing.     Zachary Smart MD  [facility]) Cardiology

## 2022-08-31 PROBLEM — E87.3 METABOLIC ALKALOSIS: Status: ACTIVE | Noted: 2022-01-01

## 2022-08-31 LAB
ANION GAP SERPL CALCULATED.3IONS-SCNC: 3 MMOL/L (ref 7–16)
BUN BLDV-MCNC: 26 MG/DL (ref 6–23)
CALCIUM SERPL-MCNC: 8.6 MG/DL (ref 8.6–10.2)
CHLORIDE BLD-SCNC: 91 MMOL/L (ref 98–107)
CO2: >50 MMOL/L (ref 22–29)
CREAT SERPL-MCNC: 1 MG/DL (ref 0.5–1)
GFR AFRICAN AMERICAN: >60
GFR NON-AFRICAN AMERICAN: 54 ML/MIN/1.73
GLUCOSE BLD-MCNC: 162 MG/DL (ref 74–99)
METER GLUCOSE: 172 MG/DL (ref 74–99)
METER GLUCOSE: 201 MG/DL (ref 74–99)
METER GLUCOSE: 206 MG/DL (ref 74–99)
METER GLUCOSE: 245 MG/DL (ref 74–99)
POTASSIUM SERPL-SCNC: 4.1 MMOL/L (ref 3.5–5)
SODIUM BLD-SCNC: 144 MMOL/L (ref 132–146)

## 2022-08-31 PROCEDURE — 97110 THERAPEUTIC EXERCISES: CPT

## 2022-08-31 PROCEDURE — 94660 CPAP INITIATION&MGMT: CPT

## 2022-08-31 PROCEDURE — 97530 THERAPEUTIC ACTIVITIES: CPT

## 2022-08-31 PROCEDURE — 97165 OT EVAL LOW COMPLEX 30 MIN: CPT

## 2022-08-31 PROCEDURE — 6370000000 HC RX 637 (ALT 250 FOR IP): Performed by: STUDENT IN AN ORGANIZED HEALTH CARE EDUCATION/TRAINING PROGRAM

## 2022-08-31 PROCEDURE — 80048 BASIC METABOLIC PNL TOTAL CA: CPT

## 2022-08-31 PROCEDURE — 6370000000 HC RX 637 (ALT 250 FOR IP): Performed by: FAMILY MEDICINE

## 2022-08-31 PROCEDURE — 36415 COLL VENOUS BLD VENIPUNCTURE: CPT

## 2022-08-31 PROCEDURE — 82962 GLUCOSE BLOOD TEST: CPT

## 2022-08-31 PROCEDURE — 99233 SBSQ HOSP IP/OBS HIGH 50: CPT | Performed by: INTERNAL MEDICINE

## 2022-08-31 PROCEDURE — 94640 AIRWAY INHALATION TREATMENT: CPT

## 2022-08-31 PROCEDURE — 6370000000 HC RX 637 (ALT 250 FOR IP): Performed by: INTERNAL MEDICINE

## 2022-08-31 PROCEDURE — 6360000002 HC RX W HCPCS: Performed by: FAMILY MEDICINE

## 2022-08-31 PROCEDURE — 2060000000 HC ICU INTERMEDIATE R&B

## 2022-08-31 PROCEDURE — 2700000000 HC OXYGEN THERAPY PER DAY

## 2022-08-31 PROCEDURE — 99233 SBSQ HOSP IP/OBS HIGH 50: CPT | Performed by: STUDENT IN AN ORGANIZED HEALTH CARE EDUCATION/TRAINING PROGRAM

## 2022-08-31 PROCEDURE — 2580000003 HC RX 258: Performed by: FAMILY MEDICINE

## 2022-08-31 PROCEDURE — 97535 SELF CARE MNGMENT TRAINING: CPT

## 2022-08-31 RX ORDER — ACETAZOLAMIDE 250 MG/1
250 TABLET ORAL DAILY
Status: DISCONTINUED | OUTPATIENT
Start: 2022-08-31 | End: 2022-09-07

## 2022-08-31 RX ORDER — BUMETANIDE 1 MG/1
1 TABLET ORAL 2 TIMES DAILY
Status: DISCONTINUED | OUTPATIENT
Start: 2022-08-31 | End: 2022-09-07

## 2022-08-31 RX ADMIN — SUCRALFATE 1 G: 1 TABLET ORAL at 10:12

## 2022-08-31 RX ADMIN — HEPARIN 100 UNITS: 100 SYRINGE at 10:17

## 2022-08-31 RX ADMIN — Medication 10 ML: at 10:18

## 2022-08-31 RX ADMIN — INSULIN LISPRO 2 UNITS: 100 INJECTION, SOLUTION INTRAVENOUS; SUBCUTANEOUS at 13:38

## 2022-08-31 RX ADMIN — IPRATROPIUM BROMIDE AND ALBUTEROL SULFATE 3 ML: .5; 2.5 SOLUTION RESPIRATORY (INHALATION) at 10:03

## 2022-08-31 RX ADMIN — MONTELUKAST 10 MG: 10 TABLET, FILM COATED ORAL at 21:40

## 2022-08-31 RX ADMIN — INSULIN LISPRO 3 UNITS: 100 INJECTION, SOLUTION INTRAVENOUS; SUBCUTANEOUS at 13:40

## 2022-08-31 RX ADMIN — SUCRALFATE 1 G: 1 TABLET ORAL at 21:47

## 2022-08-31 RX ADMIN — ANTI-FUNGAL POWDER MICONAZOLE NITRATE TALC FREE 1 EACH: 1.42 POWDER TOPICAL at 21:40

## 2022-08-31 RX ADMIN — ACETAZOLAMIDE 250 MG: 250 TABLET ORAL at 18:38

## 2022-08-31 RX ADMIN — ROPINIROLE HYDROCHLORIDE 1 MG: 1 TABLET, FILM COATED ORAL at 10:15

## 2022-08-31 RX ADMIN — SUCRALFATE 1 G: 1 TABLET ORAL at 13:36

## 2022-08-31 RX ADMIN — APIXABAN 5 MG: 5 TABLET, FILM COATED ORAL at 10:12

## 2022-08-31 RX ADMIN — BUMETANIDE 1 MG: 1 TABLET ORAL at 10:13

## 2022-08-31 RX ADMIN — BUDESONIDE 500 MCG: 0.5 SUSPENSION RESPIRATORY (INHALATION) at 06:45

## 2022-08-31 RX ADMIN — METOPROLOL SUCCINATE 75 MG: 50 TABLET, EXTENDED RELEASE ORAL at 21:40

## 2022-08-31 RX ADMIN — BUMETANIDE 1 MG: 1 TABLET ORAL at 21:38

## 2022-08-31 RX ADMIN — SUCRALFATE 1 G: 1 TABLET ORAL at 16:56

## 2022-08-31 RX ADMIN — IPRATROPIUM BROMIDE AND ALBUTEROL SULFATE 3 ML: .5; 2.5 SOLUTION RESPIRATORY (INHALATION) at 13:48

## 2022-08-31 RX ADMIN — INSULIN LISPRO 2 UNITS: 100 INJECTION, SOLUTION INTRAVENOUS; SUBCUTANEOUS at 16:58

## 2022-08-31 RX ADMIN — ANTI-FUNGAL POWDER MICONAZOLE NITRATE TALC FREE 1 EACH: 1.42 POWDER TOPICAL at 10:35

## 2022-08-31 RX ADMIN — HEPARIN 100 UNITS: 100 SYRINGE at 21:42

## 2022-08-31 RX ADMIN — ATORVASTATIN CALCIUM 20 MG: 20 TABLET, FILM COATED ORAL at 21:38

## 2022-08-31 RX ADMIN — BENZOIN RESIN: 1000 LIQUID TOPICAL at 21:38

## 2022-08-31 RX ADMIN — CARBIDOPA AND LEVODOPA 2 TABLET: 25; 100 TABLET, EXTENDED RELEASE ORAL at 10:13

## 2022-08-31 RX ADMIN — INSULIN LISPRO 3 UNITS: 100 INJECTION, SOLUTION INTRAVENOUS; SUBCUTANEOUS at 10:19

## 2022-08-31 RX ADMIN — DIGOXIN 125 MCG: 125 TABLET ORAL at 10:12

## 2022-08-31 RX ADMIN — IPRATROPIUM BROMIDE AND ALBUTEROL SULFATE 3 ML: .5; 2.5 SOLUTION RESPIRATORY (INHALATION) at 18:47

## 2022-08-31 RX ADMIN — CARBIDOPA AND LEVODOPA 2 TABLET: 25; 100 TABLET, EXTENDED RELEASE ORAL at 21:40

## 2022-08-31 RX ADMIN — APIXABAN 5 MG: 5 TABLET, FILM COATED ORAL at 21:38

## 2022-08-31 RX ADMIN — METOPROLOL SUCCINATE 75 MG: 50 TABLET, EXTENDED RELEASE ORAL at 10:10

## 2022-08-31 RX ADMIN — CARBIDOPA AND LEVODOPA 2 TABLET: 25; 100 TABLET, EXTENDED RELEASE ORAL at 13:36

## 2022-08-31 RX ADMIN — BUDESONIDE 500 MCG: 0.5 SUSPENSION RESPIRATORY (INHALATION) at 18:47

## 2022-08-31 RX ADMIN — Medication 10 ML: at 21:40

## 2022-08-31 RX ADMIN — ROPINIROLE HYDROCHLORIDE 1 MG: 1 TABLET, FILM COATED ORAL at 13:36

## 2022-08-31 RX ADMIN — Medication 10 ML: at 21:44

## 2022-08-31 RX ADMIN — PANTOPRAZOLE SODIUM 40 MG: 40 TABLET, DELAYED RELEASE ORAL at 10:12

## 2022-08-31 RX ADMIN — INSULIN GLARGINE 10 UNITS: 100 INJECTION, SOLUTION SUBCUTANEOUS at 21:50

## 2022-08-31 RX ADMIN — IPRATROPIUM BROMIDE AND ALBUTEROL SULFATE 3 ML: .5; 2.5 SOLUTION RESPIRATORY (INHALATION) at 06:45

## 2022-08-31 RX ADMIN — ROPINIROLE HYDROCHLORIDE 1 MG: 1 TABLET, FILM COATED ORAL at 21:40

## 2022-08-31 RX ADMIN — INSULIN LISPRO 3 UNITS: 100 INJECTION, SOLUTION INTRAVENOUS; SUBCUTANEOUS at 16:56

## 2022-08-31 RX ADMIN — ASPIRIN 81 MG: 81 TABLET, COATED ORAL at 10:12

## 2022-08-31 RX ADMIN — Medication 5 MG: at 21:40

## 2022-08-31 RX ADMIN — Medication 10 ML: at 10:17

## 2022-08-31 RX ADMIN — LORAZEPAM 0.5 MG: 0.5 TABLET ORAL at 01:06

## 2022-08-31 RX ADMIN — PREDNISONE 40 MG: 20 TABLET ORAL at 10:13

## 2022-08-31 RX ADMIN — LORAZEPAM 0.5 MG: 0.5 TABLET ORAL at 13:35

## 2022-08-31 ASSESSMENT — PAIN SCALES - GENERAL: PAINLEVEL_OUTOF10: 0

## 2022-08-31 NOTE — CARE COORDINATION
8/31/2022 1210 CM Note:  CM for transition of care needs at d/c. Pt's back off the Cardizem drip and on oral meds. Pt's CO2 has been elevated and has been wearing Bipap at night, wearing 1 liter of O2 during the day. Pt resides at [address] [place] [address] and will return there. Per [facility] she is a long term resident and a bed hold, if skilled need at d/c they will submit for precert but won't have to wait for auth. Per [facility] at [address] they can get her a bipap at the facility if needed at d/c. Will need signed [place] and Rapid Covid day of d/c. CM will follow.   Laurie Burton RN

## 2022-08-31 NOTE — PROGRESS NOTES
[facility] Progress Note    Admitting Date and Time: 8/24/2022 11:55 AM  Admit Dx: Atrial fibrillation with rapid ventricular response ([facility] [I48.91]    Subjective:  Patient is being followed for Atrial fibrillation with rapid ventricular response ([facility] [I48.91]   Pt feels fine, however is irritated with multiple blood draws. Per RN: patient refused ABG this morning    ROS: denies fever, chills, cp, sob, n/v, HA unless stated above.       bumetanide  1 mg Oral BID    metoprolol succinate  75 mg Oral BID    predniSONE  40 mg Oral Daily    digoxin  125 mcg Oral Daily    sodium chloride flush  5-40 mL IntraVENous 2 times per day    heparin flush  1 mL IntraVENous 2 times per day    benzoin compound   Topical Nightly    insulin glargine  10 Units SubCUTAneous Nightly    insulin lispro  0-8 Units SubCUTAneous TID WC    insulin lispro  0-4 Units SubCUTAneous Nightly    insulin lispro  3 Units SubCUTAneous TID WC    aspirin EC  81 mg Oral Daily    apixaban  5 mg Oral BID    rOPINIRole  1 mg Oral TID    sucralfate  1 g Oral 4x Daily    pantoprazole  40 mg Oral QAM    montelukast  10 mg Oral Nightly    miconazole  1 each Topical BID    ipratropium-albuterol  1 vial Inhalation 4x Daily    carbidopa-levodopa  2 tablet Oral TID    budesonide  0.5 mg Nebulization BID    atorvastatin  20 mg Oral Nightly    sodium chloride flush  5-40 mL IntraVENous 2 times per day    melatonin  5 mg Oral Nightly     sodium chloride flush, 5-40 mL, PRN  sodium chloride, , PRN  heparin flush, 1 mL, PRN  mirtazapine, 15 mg, QHS PRN  LORazepam, 0.5 mg, Q12H PRN  glucose, 4 tablet, PRN  dextrose bolus, 125 mL, PRN   Or  dextrose bolus, 250 mL, PRN  glucagon (rDNA), 1 mg, PRN  dextrose, , Continuous PRN  polyethylene glycol, 17 g, Daily PRN  acetaminophen, 650 mg, Q6H PRN   Or  acetaminophen, 650 mg, Q6H PRN       Objective:    /68   Pulse 72   Temp 98.4 °F (36.9 °C) (Oral)   Resp 20   Ht 5' (1.524 m)   Wt 222 lb 5 oz (100.8 kg)   SpO2 95%   BMI 43.42 kg/m²     General Appearance: alert and oriented to person, place and time and in no acute distress, morbidly obese  Skin: warm and dry  Head: normocephalic and atraumatic  Eyes: pupils equal, round, and reactive to light, extraocular eye movements intact, conjunctivae normal  Neck: neck supple and non tender without mass   Pulmonary/Chest: clear to auscultation bilaterally- no wheezes, rales or rhonchi, normal air movement, no respiratory distress  Cardiovascular: normal rate, normal S1 and S2 and no carotid bruits  Abdomen: soft, non-tender, non-distended, normal bowel sounds, no masses or organomegaly  Extremities: no cyanosis, no clubbing and no edema  Neurologic: no cranial nerve deficit and speech normal        Recent Labs     08/29/22  0615 08/30/22  1308 08/31/22  0617    143 144   K 3.9 4.0 4.1    92* 91*   CO2 34* 43* >50*   BUN 20 26* 26*   CREATININE 0.9 0.9 1.0   GLUCOSE 146* 205* 162*   CALCIUM 8.1* 9.2 8.6       No results for input(s): WBC, RBC, HGB, HCT, MCV, MCH, MCHC, RDW, PLT, MPV in the last 72 hours. Radiology:   XR CHEST PORTABLE    Result Date: 8/29/2022  EXAMINATION: ONE XRAY VIEW OF THE CHEST 8/29/2022 6:34 am COMPARISON: 08/26/2022 HISTORY: ORDERING SYSTEM PROVIDED HISTORY: SOB TECHNOLOGIST PROVIDED HISTORY: Reason for exam:->SOB FINDINGS: Heart is upper limits of normal in size. There is reaccumulation of a small right pleural effusion which appears partially loculated in the major fissure. No pneumothorax. No new alveolar consolidation. Osseous structures are stable. Unchanged postop change in the right chest wall soft tissues. 1.  Small right pleural effusion. 2.  No other significant interval change.       Assessment:    Principal Problem:    Acute respiratory failure with hypoxia and hypercapnia (HCC)  Active Problems:    Acute decompensated heart failure (HCC)    Encephalopathy    Pleural effusion, right    Acute confusion Chronic diastolic (congestive) heart failure (HCC)    Macrocytosis    Other specified anemias    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[facility]    Puerperal sepsis with acute hypoxic respiratory failure without septic shock (HCC)    Pulmonary HTN (HCC)    Chronic anticoagulation    RVF (right ventricular failure) (HCC)    Diabetes mellitus (HCC)    Paroxysmal atrial fibrillation (HCC)    Atrial fibrillation with RVR (HCC)    Coronary artery disease involving native coronary artery of native heart without angina pectoris  Resolved Problems:    * No resolved hospital problems. *    Plan:  1. A/C Respiratory failure with hypoxia and hypercapnia - continue respiratory toilet and medications. Currently on RA. PT/OT to start with evaluations and daily sessions. 2.  Right pleural effusion, decreased - continue current regiment. Is and Os. Saline lock IV. 3.  SHANTANU, resolved - avoid nephrotoxins. Trend labs and treat accordingly. 4.  Atrial fibrillation - continue meds. Telemetry monitoring. Toprol XL 50 bid and Bumex 1 mg po bid ordered by Cardiology  5. T2DM - Diabetic diet. Glucose POCT. Continue Lantus, 3 units of Humalog with meals and MDSS insulin scale. On 8/24/22, HgbA1C 6.1%, excellent control. Hypoglycemia protocol. 6.  Encephalopathy, improved - supportive care. 7.  Metabolic alkalosis - bicarb over 50, AG 3. Unsure of cause, possible contraction alkalosis, chloride is also depleted. Also possible compensatory for chronic hypercapnic respiratory failure, however has increased significantly (usually in 30s and today 50). Currently on IV bumex but being converted to PO today. Consider acetazolamide therapy. Patient refuses ABG. Check urine studies. Will also discuss with cardiology    Total care time:  25 minutes    NOTE: This report was transcribed using voice recognition software.  Every effort was made to ensure accuracy; however, inadvertent computerized transcription errors may be

## 2022-08-31 NOTE — PROGRESS NOTES
Patient is seen in follow-up for atrial fibrillation    Date of service: 8/31/2022    Subjective:   No new overnight cardiac complaints. No chest pain or palpitations. +SOB, but respiratory status improved since admission per patient. AF at rate 80's on telemetry. I/O's inaccurate (urine output more than documented).     ROS:  Cardiac: As per HPI  General: No fever, chills  Pulmonary: As per HPI  HEENT: No visual disturbances, difficult swallowing  GI: No nausea, vomiting  : No dysuria, hematuria  Endocrine: No thyroid disease, +DM  Musculoskeletal: HDZ x 4, no focal motor deficits  Skin: Intact, no rashes  Neuro: No headache, seizures  Psych: Currently with no depression, anxiety    Scheduled Meds:   metoprolol succinate  50 mg Oral BID    predniSONE  40 mg Oral Daily    bumetanide  1 mg IntraVENous BID    digoxin  125 mcg Oral Daily    sodium chloride flush  5-40 mL IntraVENous 2 times per day    heparin flush  1 mL IntraVENous 2 times per day    benzoin compound   Topical Nightly    insulin glargine  10 Units SubCUTAneous Nightly    insulin lispro  0-8 Units SubCUTAneous TID WC    insulin lispro  0-4 Units SubCUTAneous Nightly    insulin lispro  3 Units SubCUTAneous TID WC    aspirin EC  81 mg Oral Daily    apixaban  5 mg Oral BID    rOPINIRole  1 mg Oral TID    sucralfate  1 g Oral 4x Daily    pantoprazole  40 mg Oral QAM    montelukast  10 mg Oral Nightly    miconazole  1 each Topical BID    ipratropium-albuterol  1 vial Inhalation 4x Daily    carbidopa-levodopa  2 tablet Oral TID    budesonide  0.5 mg Nebulization BID    atorvastatin  20 mg Oral Nightly    sodium chloride flush  5-40 mL IntraVENous 2 times per day    melatonin  5 mg Oral Nightly     Continuous Infusions:   sodium chloride      dextrose       PRN Meds:sodium chloride flush, sodium chloride, heparin flush, mirtazapine, LORazepam, glucose, dextrose bolus **OR** dextrose bolus, glucagon (rDNA), dextrose, polyethylene glycol, acetaminophen **OR** acetaminophen    I/O last 3 completed shifts: In: 5 [P.O.:720]  Out: 800 [Urine:800]  I/O this shift:  In: 240 [P.O.:240]  Out: -       Objective:      Physical Exam:   BP (!) 143/80   Pulse 84   Temp 97 °F (36.1 °C) (Infrared)   Resp 20   Ht 5' (1.524 m)   Wt 222 lb 5 oz (100.8 kg)   SpO2 96%   BMI 43.42 kg/m²   Appearance: Awake, alert and oriented x 3, no acute respiratory distress  Skin: Intact, no rash  Head: Normocephalic, atraumatic  Eyes: EOMI, no conjunctival erythema  ENMT: No pharyngeal erythema, MMM, no rhinorrhea, no dentures  Neck: Supple, no carotid bruits  Lungs: Decreased BS B/L, no wheezing  Cardiac: IRRR, no murmurs apparent  Abdomen: Soft, nontender, +bowel sounds  Extremities: Moves all extremities x 4, no lower extremity edema  Neurologic: No focal motor deficits apparent, normal mood and affect, alert and oriented x 3    Imaging  XR CHEST PORTABLE   Final Result   1. Small right pleural effusion. 2.  No other significant interval change. IR GUIDED THORACENTESIS PLEURAL   Final Result   Successful ultrasound guided thoracentesis. XR CHEST 1 VIEW   Final Result   1. There is no right pneumothorax status post right thoracentesis   2. Right lung base atelectasis   3. The left lung is clear. XR CHEST PORTABLE   Final Result   1. Cardiomegaly with findings of CHF   2. Moderate size right pleural effusion   3.  Trace left pleural effusion             Lab Review   Lab Results   Component Value Date    WBC 9.0 08/25/2022    HGB 9.0 (L) 08/25/2022    HCT 31.5 (L) 08/25/2022    MCV 98.4 08/25/2022     08/25/2022     Lab Results   Component Value Date    CREATININE 1.0 08/31/2022    BUN 26 (H) 08/31/2022     08/31/2022    K 4.1 08/31/2022    CL 91 (L) 08/31/2022    CO2 >50 (HH) 08/31/2022     Lab Results   Component Value Date    TSH 4.580 (H) 07/06/2022     Lab Results   Component Value Date    LABA1C 6.1 (H) 08/24/2022     No results found for: EAG    Lab Results   Component Value Date    DIGOXIN 0.6 (L) 08/30/2022     Telemetry personally reviewed (8/31/2022): AF, rate 80's    Cardiac catheterization: 1/11/22 (Dr. Cassandra Hager)  Left main: 0%  stenosis  LAD: 50 %  stenosis  Circumflex: 50 %   stenosis  RCA: Co dominant. 0 %  stenosis  LV angio: 75-80%  ejection fraction    Echocardiogram: 7/7/22 (Dr. Jose Horta)   Technically difficult study - limited visualization. Micro-bubble contrast injected to enhance left ventricular visualization. Normal left ventricular size and systolic function. Ejection fraction is visually estimated at 70-75%. Indeterminate diastolic function. No regional wall motion abnormalities seen. Mild left ventricular concentric hypertrophy noted. Moderately dilated right ventricle with reduced function. Biatrial dilation. Mild tricuspid regurgitation. RVSP is at least 60 mmHg. Prominent pericardial fat pad. No definitive evidence of pericardial effusion. Assessment:     1. Atrial fibrillation with episodes of RVR / persistent atrial fibrillation -- multiple prior CVN's and recurrence of AF  2. Acute hypoxic respiratory failure: acute decompensated heart failure, COPD  3. Acute on chronic diastolic congestive heart failure  4. Bilateral pleural effusion -- s/p right sided thoracentesis (900 cc) on 8/26/22  5. CAD: Nonobstructive involving LAD and left circumflex artery  6. Mitral valve regurgitation:   7. Tricuspid valve regurgitation: Mild  8. Pulmonary hypertension  9. Hypertension: Controlled  10. Hyperlipidemia  11. Type 2 diabetes mellitus  12. Chronic anemia -- most recent Hgb 9.0  13. Chronic OAC with eliquis  14, SERENA -- noncompliant with treatment  15.  Tobacco abuse  16, BMI 45.4    Recommendations:     Monitor BMP and I/O's (inaccurate) with ongoing diuresis -- currently IV bumex 1 mg BID --> will switch to po bumex today; will order BMP  Increased BB dose and switched to Toprol XL (50 mg BID) on

## 2022-08-31 NOTE — PROGRESS NOTES
Physical Therapy  Physical Therapy Treatment Note/Plan of Care    Room #:  9726/4420-69  Patient Name: Francis Dubon  YOB: 1950  MRN: [id number]    Date of Service: 8/31/2022     Tentative placement recommendation: Subacute rehab or Long Term Acute Care  Equipment recommendation:  To be determined      Evaluating Physical Therapist: Adilson Alejandra PT, DPT #[id number]      Specific Provider Orders/Date/Referring Provider :     08/28/22 1430    PT eval and treat  Start:  08/28/22 1430,   End:  08/28/22 1430,   ONE TIME,   Standing Count:  1 Occurrences,   R         Brenda Partida, DO Acknowledge New     Admitting Diagnosis:   Atrial fibrillation with rapid ventricular response ([address] [I48.91]      Surgery: none  Visit Diagnoses         Codes    Atrial fibrillation with RVR ([address]     I48.91            Patient Active Problem List   Diagnosis    Depression with anxiety    Osteoporosis    Asthma    Hyperlipidemia    Mitral regurgitation    Obstructive sleep apnea syndrome    Psoriasis    Diabetes mellitus ([address]    Parkinson's disease ([address]    Primary hypertension    Microalbuminuria    Morbid obesity ([address]    RLS (restless legs syndrome)    Generalized weakness    Inability to walk    Hypothyroidism    Chest pain    Acute asthma exacerbation    Asthma exacerbation, mild    Paroxysmal atrial fibrillation (HCC)    Atrial fibrillation with RVR (HCC)    Acute on chronic congestive heart failure ([address]    Coronary artery disease involving native coronary artery of native heart without angina pectoris    Dysphagia    Hepatosplenomegaly    Acute decompensated heart failure (HCC)    Acute diastolic (congestive) heart failure (HCC)    Nonrheumatic tricuspid valve regurgitation    Tobacco abuse    Recurrent syncope    Septic shock ([address]    Seizure-like activity (HCC)    SHANTANU (acute kidney injury) ([address]    Pancytopenia ([address]    Thrombocytopenia ([address]    Encephalopathy    Acute respiratory failure with hypoxia ([address] Lactic acidosis    Delirium    Acute on chronic anemia    Pleural effusion, right    Acute respiratory failure with hypoxia and hypercapnia (HCC)    Acute confusion    Chronic diastolic (congestive) heart failure (HCC)    Macrocytosis    Other specified anemias    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[facility]    Puerperal sepsis with acute hypoxic respiratory failure without septic shock (HCC)    Pulmonary HTN ([facility])    Chronic anticoagulation    RVF (right ventricular failure) ([facility])        ASSESSMENT of Current Deficits Patient exhibits decreased strength, balance, and endurance impairing functional mobility, transfers, gait , gait distance, and tolerance to activity. Patient needing to use bed pan multiple times during treatment session. Decrease assist needed during this session for bed mobility; use of bed rails needed. Mildly unsteady during gait; max cues for hand placement; progress distance next session. Patient needs continued PT to improve strength and endurance to tolerate and complete functional mobility with decreased assist required.         PHYSICAL THERAPY  PLAN OF CARE       Physical therapy plan of care is established based on physician order,  patient diagnosis and clinical assessment    Current Treatment Recommendations:    -Bed Mobility: Lower extremity exercises  and Trunk control activities   -Sitting Balance: Incorporate reaching activities to activate trunk muscles , Hands on support to maintain midline , Facilitate active trunk muscle engagement , Facilitate postural control in all planes , and Engage in core activities to allow for movement within base of support   -Standing Balance: Perform strengthening exercises in standing to promote motor control with or without upper extremity support , Instruct patient on adequate base of support to maintain balance, and Challenge balance utilizing reaching  activities beyond center of gravity    -Transfers: Provide instruction on proper hand and foot position for adequate transfer of weight onto lower extremities and use of gait device if needed, Cues for hand placement, technique and safety. Provide stabilization to prevent fall , Facilitate weight shift forward on to lower extremities and provide necessary stabilization of bilateral lower extremities , Support transfer of weight on to lower extremities, and Assist with extension of knees trunk and hip to accept weight transfer   -Gait: Gait training, Standing activities to improve: base of support, weight shift, weight bearing , Exercises to improve trunk control, Exercises to improve hip and knee control, Performance of protected weight bearing activities, and Activities to increase weight bearing   -Endurance: Utilize Supervised activities to increase level of endurance to allow for safe functional mobility including transfers and gait  and Use graduated activities to promote good breathing techniques and provide support and education to maximize respiratory function    PT long term treatment goals are located in below grid    Patient and or family understand(s) diagnosis, prognosis, and plan of care.     Frequency of treatments: Patient will be seen  dailyyy         Prior Level of Function: Patient ambulated with wheeled walker   Rehab Potential: good - for baseline    Past medical history:   Past Medical History:   Diagnosis Date    A-fib ([facility]     Acute on chronic congestive heart failure (HCC)     Anxiety     Asthma     CAD (coronary artery disease) 01/21/2016    Cancer ([facility]  breast ca 2006    right lumpectomy    Chronic kidney disease     nephrolithiasis    Depression     Diabetes mellitus ([address]     H/O mammogram     Hx MRSA infection     toe infection january 2012    Hyperlipidemia     Hypertension     Lateral epicondylitis     SERENA on CPAP     Parkinson's disease ([facility]     Tubal ligation status      Past Surgical History:   Procedure Laterality Date    BREAST LUMPECTOMY      BREAST REDUCTION SURGERY CARDIAC CATHETERIZATION  4/28/2014    Dr. Enedina Salcedo  01/11/2022    Dr. Lisseth Salas  7/29/15    CT GUIDED CHEST TUBE  7/8/2022    CT GUIDED CHEST TUBE 7/8/2022 Mona Vinson MD [facility]    ENDOSCOPY, COLON, DIAGNOSTIC  7/19/15    GALLBLADDER SURGERY      LUMBAR LAMINECTOMY      TOE AMPUTATION      TONSILLECTOMY      UPPER GASTROINTESTINAL ENDOSCOPY      UPPER GASTROINTESTINAL ENDOSCOPY N/A 7/19/2022    EGD ESOPHAGOGASTRODUODENOSCOPY performed by Edouard Ndiaye MD at [address]:    Precautions: Up with assistance, falls, O2, and Afib, morbid obesity      Social history: Patient lives at [employer] owned: [place], [address], and [address] Mobility Inpatient   How much difficulty turning over in bed?: A Little  How much difficulty sitting down on / standing up from a chair with arms?: A Little  How much difficulty moving from lying on back to sitting on side of bed?: A Little  How much help from another person moving to and from a bed to a chair?: A Little  How much help from another person needed to walk in hospital room?: A Lot  How much help from another person for climbing 3-5 steps with a railing?: A Lot  AM-PAC Inpatient Mobility Raw Score : 16  AM-PAC Inpatient T-Scale Score : 40.78  Mobility Inpatient CMS 0-100% Score: 54.16  Mobility Inpatient CMS G-Code Modifier : CK    Nursing cleared patient for PT treatment. OBJECTIVE;   Initial Evaluation  Date: 8/29/2022 Treatment Date:  8/31/2022     Short Term/ Long Term   Goals   Was pt agreeable to Eval/treatment? Yes Yes  To be met in 3 days   Pain level   0 /10   0/10    Bed Mobility    Rolling: Moderate assist of 1    Supine to sit: Moderate assist of 1    Sit to supine:  Moderate assist of 1    Scooting: Minimal assist of 1   Rolling: Minimal assist of 1   Supine to sit: Minimal assist of 1   Sit to supine: Minimal assist of 1 Scooting: Minimal assist of 1    Rolling: Independent    Supine to sit: Independent    Sit to supine: Independent    Scooting: Independent     Transfers Sit to stand: Minimal assist of 1  Sit to stand: Minimal assist of 1    Sit to stand: Independent    Ambulation     3-5 feet using  wheeled walker with Minimal assist of 1   for walker control, walker approximation, balance, and safety 2 feet using  wheeled walker with Minimal assist of 1   for balance, upright, and safety       > 75 feet using  wheeled walker with Modified Independent    Stair negotiation: ascended and descended   Not assessed  not assessed        ROM Within functional limits    Increase range of motion 10% of affected joints    Strength BUE:  refer to OT eval  RLE:  4-/5  LLE:  4-/5  Increase strength in affected mm groups by 1/3 grade   Balance Sitting EOB:  fair +  Dynamic Standing:  fair  Sitting EOB: fair plus  Dynamic Standing: fair    Sitting EOB:  good   Dynamic Standing: fair +     Patient is Alert & Oriented x person, place, time, and situation and follows directions    Sensation:  Patient  denies numbness/tingling   Edema:  no   Endurance: poor +    Vitals:  1.5 liters nasal cannula   Blood Pressure at rest  Blood Pressure during session    Heart Rate at rest Heart Rate during session   SPO2 at rest %  SPO2 during session 88-93%     Patient education  Patient educated on role of Physical Therapy, risks of immobility, safety and plan of care, energy conservation,  importance of mobility while in hospital , purse lip breathing, ankle pumps, quad set and glut set for edema control, blood clot prevention, importance and purpose of adaptive device and adjusted to proper height for the patient. , safety , and O2 line management and safety      Patient response to education:   Pt verbalized understanding Pt demonstrated skill Pt requires further education in this area   Yes Partial Yes      Treatment:  Patient practiced and was

## 2022-08-31 NOTE — PROGRESS NOTES
OCCUPATIONAL THERAPY BEDSIDE TREATMENT NOTE   [facility]  [facility] [place]     Date:2022  Patient Name: Servando Baron  MRN: [id number]  : 1950  Room: [address] [place] [address]     Evaluating OT: Jairon Gomez OTR/L; [id number]        Referring Provider and Orders/Date:   OT eval and treat  Start:  22 1430,   End:  22 1430,   ONE TIME,   Standing Count:  1 Occurrences,   R         La Ortiz DO         Diagnosis:   1. Acute respiratory failure with hypoxia ([address]    2.  Atrial fibrillation with RVR ([address]          Surgery: none      Pertinent Medical History: PD       Past Medical History           Past Medical History:   Diagnosis Date    A-fib ([address]      Acute on chronic congestive heart failure (HCC)      Anxiety      Asthma      CAD (coronary artery disease) 2016    Cancer ([address]  breast ca 2006     right lumpectomy    Chronic kidney disease       nephrolithiasis    Depression      Diabetes mellitus ([address]      H/O mammogram      Hx MRSA infection       toe infection 2012    Hyperlipidemia      Hypertension      Lateral epicondylitis      SERENA on CPAP      Parkinson's disease ([address]      Tubal ligation status               Past Surgical History             Past Surgical History:   Procedure Laterality Date    BREAST LUMPECTOMY        BREAST REDUCTION SURGERY        CARDIAC CATHETERIZATION   2014     Dr. Antolin Mcqueen   2022     Dr. Madison Vaughan   7/29/15    CT GUIDED CHEST TUBE   2022     CT GUIDED CHEST TUBE 2022 Maxcine Cowden, MD [facility]    ENDOSCOPY, COLON, DIAGNOSTIC   7/19/15    GALLBLADDER SURGERY        LUMBAR LAMINECTOMY        TOE AMPUTATION        TONSILLECTOMY        UPPER GASTROINTESTINAL ENDOSCOPY        UPPER GASTROINTESTINAL ENDOSCOPY N/A 2022     EGD ESOPHAGOGASTRODUODENOSCOPY performed by Haresh Grossman MD at [address] [place] [address] Precautions:  Fall Risk, 2L    Recommended placement: subacute    Assessment of current deficits     [x] Functional mobility           [x]ADLs           [x] Strength                  []Cognition     [x] Functional transfers         [x] IADLs         [x] Safety Awareness   [x]Endurance     [] Fine Coordination                        [x] Balance      [] Vision/perception   []Sensation       [x]Gross Motor Coordination            [] ROM           [] Delirium                   [] Motor Control      OT PLAN OF CARE   OT POC based on physician orders, patient diagnosis and results of clinical assessment     Frequency/Duration 1-3 days/wk for 2 weeks PRN   Specific OT Treatment Interventions to include:   * Instruction/training on adapted ADL techniques and AE recommendations to increase functional independence within precautions       * Training on energy conservation strategies, correct breathing pattern and techniques to improve independence/tolerance for self-care routine  * Functional transfer/mobility training/DME recommendations for increased independence, safety, and fall prevention  * Patient/Family education to increase follow through with safety techniques and functional independence  * Recommendation of environmental modifications for increased safety with functional transfers/mobility and ADLs  * Therapeutic exercise to improve motor endurance, ROM, and functional strength for ADLs/functional transfers  * Therapeutic activities to facilitate/challenge dynamic balance, stand tolerance for increased safety and independence with ADLs  * Therapeutic activities to facilitate gross/fine motor skills for increased independence with ADLs  * Neuro-muscular re-education: facilitation of righting/equilibrium reactions, midline orientation, scapular stability/mobility, normalization of muscle tone, and facilitation of volitional active controled movement  * Positioning to improve skin integrity, interaction with thread LEs through briefs and don over hips upon standing supported at walker. Maximal Assist    Bathing Max A from supine level N/T  Mod  Assist    Toileting Dependent from supine with bed pan x 2 during session. Pt with frequent BM's the last 2 days per report. Dependent for set-up/removal of bedpan while seated at EOB and perineal hygiene while standing at walker. Maximal Assist    Bed Mobility  Rolling in bed to right and left sides with mod A. Attempted EOB x 2 with session and max A suspected, but with need for bed pan/BM with both attempts. Unable to attempt a 3rd time with lunch arriving. Max A x 2 to boost in bed and position for safety with meal.   N/T; pt in chair at start/end of session  Supine to sit: min Assist   Sit to supine: Minimal Assist    Functional Transfers  NT due to BM x 2 Minimal Assist from chair/EOB to walker x 3 reps. Verbal cues for hand placement and technique to improve safety. Minimal Assist    Functional Mobility  NT on eval due to toileting and lunch arriving Minimal Assist with walker short distance from chair to/from EOB. Verbal cues for overall safety. Minimal Assist    Balance Sitting:     Static:  NT    Dynamic:NT  Standing: NT Sitting:   Static: supervision at EOB   Dynamic: MIN A   Standing: MIN A     Sitting:     Static:  fair+    Dynamic:fair  Standing: fair-   Activity Tolerance Vitals with activity:2L with O2 dropping with bed mobility and attempts to EOB to 78%, returned to supine with 2min recovery to 92%; 97% at end of session. Fair; SpO2 decreasing to 86% on 2L with mobility. Pt denied SOB. HR stable throughout. Standing tolerance up to x 1 min. Increase standing tolerance for >2-3min with stable vital signs for carry over into toileting, functional tranfers and indep in ADLs   Visual/  Perceptual Glasses: not Present; WFL     Reports change in vision since admission: No      NA     Comments: RN cleared patient for OT.  Upon arrival to the room the patient was in chair. At end of the session, the patient was in chair. Call light and phone within reach. Pt required verbal cues and instruction as noted above for safe facilitation and completion of tasks. Therapist provided skilled monitoring of the patient's response during treatment session. Prior to and at the end of session, environmental modifications/line management completed for patients safety and efficiency of treatment session. Overall, the patient demonstrates decreased independence with ADLs and functional transfers and mobility. Pt limited by generalized weakness, endurance, and activity tolerance. Pt would benefit from continued skilled OT to increase independence with BADL's and IADL's. Treatment: OT treatment provided this date includes:  Instructions/training on safety, sequencing, and adapted techniques for completion of ADLs. Instruction/training on safe functional mobility/transfer techniques including hand and feet placement   Instruction/training on energy conservation/work simplification for completion of ADLs   Skilled monitoring of O2 sats - see section above     Pt has made fair progress towards set goals. Continue with current plan of care       Treatment time includes thorough review of current medical information, gathering information on past medical history/social history and prior level of function, completion of standardized testing/informal observation of tasks, assessment of data, and development of POC/Goals.     Time In: 2:00 PM    Time Out: 2:40 PM                  Min Units   OT Eval Low 95722     OT Eval Medium 28290     OT Eval High 38910     OT Re-Eval 69234          ADL/Self Care 48455 25 2   Therapeutic Activities 09584 70 1   Therapeutic Ex 80076     Orthotic Management 08964     Neuro Re-Ed 73725     Non-Billable Time     TOTAL TIMED TREATMENT 40 3     Chantel Gaona OTR/L #[id number]

## 2022-08-31 NOTE — PROGRESS NOTES
Physical Therapy      Physical Therapy    Room #:   5299/3055-52    Date: 2022       Patient Name: Asaf Fleming  :       MRN: [id number]     Patient unavailable for physical therapy treatment due to  family present and on phone, requested to come back. Physical therapy will check back at a later time/date.         Dominick Bailey, PTA

## 2022-08-31 NOTE — PLAN OF CARE
Problem: ABCDS Injury Assessment  Goal: Absence of physical injury  Outcome: Progressing     Problem: Safety - Adult  Goal: Free from fall injury  Outcome: Progressing     Problem: Safety - Adult  Goal: Free from fall injury  Outcome: Progressing     Problem: Skin/Tissue Integrity  Goal: Absence of new skin breakdown  Description: 1. Monitor for areas of redness and/or skin breakdown  2. Assess vascular access sites hourly  3. Every 4-6 hours minimum:  Change oxygen saturation probe site  4. Every 4-6 hours:  If on nasal continuous positive airway pressure, respiratory therapy assess nares and determine need for appliance change or resting period.   Outcome: Progressing     Problem: Chronic Conditions and Co-morbidities  Goal: Patient's chronic conditions and co-morbidity symptoms are monitored and maintained or improved  Outcome: Progressing     Problem: Pain  Goal: Verbalizes/displays adequate comfort level or baseline comfort level  Outcome: Progressing

## 2022-09-01 LAB
ANION GAP SERPL CALCULATED.3IONS-SCNC: 8 MMOL/L (ref 7–16)
BUN BLDV-MCNC: 26 MG/DL (ref 6–23)
CALCIUM SERPL-MCNC: 8.8 MG/DL (ref 8.6–10.2)
CHLORIDE BLD-SCNC: 90 MMOL/L (ref 98–107)
CO2: 46 MMOL/L (ref 22–29)
CREAT SERPL-MCNC: 1 MG/DL (ref 0.5–1)
GFR AFRICAN AMERICAN: >60
GFR NON-AFRICAN AMERICAN: 54 ML/MIN/1.73
GLUCOSE BLD-MCNC: 169 MG/DL (ref 74–99)
METER GLUCOSE: 153 MG/DL (ref 74–99)
METER GLUCOSE: 207 MG/DL (ref 74–99)
METER GLUCOSE: 236 MG/DL (ref 74–99)
METER GLUCOSE: 323 MG/DL (ref 74–99)
POTASSIUM SERPL-SCNC: 3.7 MMOL/L (ref 3.5–5)
SODIUM BLD-SCNC: 144 MMOL/L (ref 132–146)

## 2022-09-01 PROCEDURE — 94660 CPAP INITIATION&MGMT: CPT

## 2022-09-01 PROCEDURE — 80048 BASIC METABOLIC PNL TOTAL CA: CPT

## 2022-09-01 PROCEDURE — 6370000000 HC RX 637 (ALT 250 FOR IP): Performed by: INTERNAL MEDICINE

## 2022-09-01 PROCEDURE — 6360000002 HC RX W HCPCS: Performed by: FAMILY MEDICINE

## 2022-09-01 PROCEDURE — 6370000000 HC RX 637 (ALT 250 FOR IP): Performed by: FAMILY MEDICINE

## 2022-09-01 PROCEDURE — 97530 THERAPEUTIC ACTIVITIES: CPT

## 2022-09-01 PROCEDURE — 82962 GLUCOSE BLOOD TEST: CPT

## 2022-09-01 PROCEDURE — 94640 AIRWAY INHALATION TREATMENT: CPT

## 2022-09-01 PROCEDURE — 99233 SBSQ HOSP IP/OBS HIGH 50: CPT | Performed by: INTERNAL MEDICINE

## 2022-09-01 PROCEDURE — 2060000000 HC ICU INTERMEDIATE R&B

## 2022-09-01 PROCEDURE — 6370000000 HC RX 637 (ALT 250 FOR IP): Performed by: STUDENT IN AN ORGANIZED HEALTH CARE EDUCATION/TRAINING PROGRAM

## 2022-09-01 PROCEDURE — 99232 SBSQ HOSP IP/OBS MODERATE 35: CPT | Performed by: STUDENT IN AN ORGANIZED HEALTH CARE EDUCATION/TRAINING PROGRAM

## 2022-09-01 PROCEDURE — 36415 COLL VENOUS BLD VENIPUNCTURE: CPT

## 2022-09-01 PROCEDURE — 2580000003 HC RX 258: Performed by: FAMILY MEDICINE

## 2022-09-01 PROCEDURE — 2700000000 HC OXYGEN THERAPY PER DAY

## 2022-09-01 RX ORDER — INSULIN GLARGINE 100 [IU]/ML
10 INJECTION, SOLUTION SUBCUTANEOUS 2 TIMES DAILY
Status: DISCONTINUED | OUTPATIENT
Start: 2022-09-01 | End: 2022-09-02

## 2022-09-01 RX ADMIN — ASPIRIN 81 MG: 81 TABLET, COATED ORAL at 07:57

## 2022-09-01 RX ADMIN — ANTI-FUNGAL POWDER MICONAZOLE NITRATE TALC FREE 1 EACH: 1.42 POWDER TOPICAL at 20:06

## 2022-09-01 RX ADMIN — ROPINIROLE HYDROCHLORIDE 1 MG: 1 TABLET, FILM COATED ORAL at 20:00

## 2022-09-01 RX ADMIN — BUMETANIDE 1 MG: 1 TABLET ORAL at 07:57

## 2022-09-01 RX ADMIN — SUCRALFATE 1 G: 1 TABLET ORAL at 20:00

## 2022-09-01 RX ADMIN — INSULIN LISPRO 3 UNITS: 100 INJECTION, SOLUTION INTRAVENOUS; SUBCUTANEOUS at 11:43

## 2022-09-01 RX ADMIN — INSULIN LISPRO 3 UNITS: 100 INJECTION, SOLUTION INTRAVENOUS; SUBCUTANEOUS at 16:43

## 2022-09-01 RX ADMIN — APIXABAN 5 MG: 5 TABLET, FILM COATED ORAL at 07:57

## 2022-09-01 RX ADMIN — IPRATROPIUM BROMIDE AND ALBUTEROL SULFATE 3 ML: .5; 2.5 SOLUTION RESPIRATORY (INHALATION) at 06:51

## 2022-09-01 RX ADMIN — BUDESONIDE 500 MCG: 0.5 SUSPENSION RESPIRATORY (INHALATION) at 06:51

## 2022-09-01 RX ADMIN — Medication 10 ML: at 07:58

## 2022-09-01 RX ADMIN — METOPROLOL SUCCINATE 75 MG: 50 TABLET, EXTENDED RELEASE ORAL at 20:00

## 2022-09-01 RX ADMIN — ROPINIROLE HYDROCHLORIDE 1 MG: 1 TABLET, FILM COATED ORAL at 13:51

## 2022-09-01 RX ADMIN — ATORVASTATIN CALCIUM 20 MG: 20 TABLET, FILM COATED ORAL at 20:01

## 2022-09-01 RX ADMIN — Medication 10 ML: at 23:25

## 2022-09-01 RX ADMIN — HEPARIN 100 UNITS: 100 SYRINGE at 07:58

## 2022-09-01 RX ADMIN — INSULIN LISPRO 3 UNITS: 100 INJECTION, SOLUTION INTRAVENOUS; SUBCUTANEOUS at 08:01

## 2022-09-01 RX ADMIN — Medication 5 MG: at 22:53

## 2022-09-01 RX ADMIN — IPRATROPIUM BROMIDE AND ALBUTEROL SULFATE 3 ML: .5; 2.5 SOLUTION RESPIRATORY (INHALATION) at 17:14

## 2022-09-01 RX ADMIN — METOPROLOL SUCCINATE 75 MG: 50 TABLET, EXTENDED RELEASE ORAL at 07:57

## 2022-09-01 RX ADMIN — INSULIN LISPRO 2 UNITS: 100 INJECTION, SOLUTION INTRAVENOUS; SUBCUTANEOUS at 11:41

## 2022-09-01 RX ADMIN — IPRATROPIUM BROMIDE AND ALBUTEROL SULFATE 3 ML: .5; 2.5 SOLUTION RESPIRATORY (INHALATION) at 13:25

## 2022-09-01 RX ADMIN — BENZOIN RESIN: 1000 LIQUID TOPICAL at 19:59

## 2022-09-01 RX ADMIN — SUCRALFATE 1 G: 1 TABLET ORAL at 13:51

## 2022-09-01 RX ADMIN — ACETAZOLAMIDE 250 MG: 250 TABLET ORAL at 09:39

## 2022-09-01 RX ADMIN — IPRATROPIUM BROMIDE AND ALBUTEROL SULFATE 3 ML: .5; 2.5 SOLUTION RESPIRATORY (INHALATION) at 09:51

## 2022-09-01 RX ADMIN — DIGOXIN 125 MCG: 125 TABLET ORAL at 07:57

## 2022-09-01 RX ADMIN — SUCRALFATE 1 G: 1 TABLET ORAL at 16:17

## 2022-09-01 RX ADMIN — SUCRALFATE 1 G: 1 TABLET ORAL at 07:58

## 2022-09-01 RX ADMIN — CARBIDOPA AND LEVODOPA 2 TABLET: 25; 100 TABLET, EXTENDED RELEASE ORAL at 20:01

## 2022-09-01 RX ADMIN — MONTELUKAST 10 MG: 10 TABLET, FILM COATED ORAL at 20:00

## 2022-09-01 RX ADMIN — CARBIDOPA AND LEVODOPA 2 TABLET: 25; 100 TABLET, EXTENDED RELEASE ORAL at 13:51

## 2022-09-01 RX ADMIN — LORAZEPAM 0.5 MG: 0.5 TABLET ORAL at 01:43

## 2022-09-01 RX ADMIN — HEPARIN 100 UNITS: 100 SYRINGE at 23:25

## 2022-09-01 RX ADMIN — PREDNISONE 40 MG: 20 TABLET ORAL at 07:57

## 2022-09-01 RX ADMIN — ROPINIROLE HYDROCHLORIDE 1 MG: 1 TABLET, FILM COATED ORAL at 07:57

## 2022-09-01 RX ADMIN — INSULIN GLARGINE 10 UNITS: 100 INJECTION, SOLUTION SUBCUTANEOUS at 23:06

## 2022-09-01 RX ADMIN — INSULIN LISPRO 6 UNITS: 100 INJECTION, SOLUTION INTRAVENOUS; SUBCUTANEOUS at 16:43

## 2022-09-01 RX ADMIN — BUMETANIDE 1 MG: 1 TABLET ORAL at 20:00

## 2022-09-01 RX ADMIN — CARBIDOPA AND LEVODOPA 2 TABLET: 25; 100 TABLET, EXTENDED RELEASE ORAL at 07:58

## 2022-09-01 RX ADMIN — LORAZEPAM 0.5 MG: 0.5 TABLET ORAL at 22:53

## 2022-09-01 RX ADMIN — PANTOPRAZOLE SODIUM 40 MG: 40 TABLET, DELAYED RELEASE ORAL at 07:57

## 2022-09-01 RX ADMIN — BUDESONIDE 500 MCG: 0.5 SUSPENSION RESPIRATORY (INHALATION) at 17:14

## 2022-09-01 RX ADMIN — ANTI-FUNGAL POWDER MICONAZOLE NITRATE TALC FREE 1 EACH: 1.42 POWDER TOPICAL at 08:00

## 2022-09-01 RX ADMIN — APIXABAN 5 MG: 5 TABLET, FILM COATED ORAL at 20:00

## 2022-09-01 ASSESSMENT — PAIN SCALES - GENERAL
PAINLEVEL_OUTOF10: 0
PAINLEVEL_OUTOF10: 0

## 2022-09-01 NOTE — PROGRESS NOTES
Physical Therapy  Physical Therapy Treatment Note/Plan of Care    Room #:  2439/1130-19  Patient Name: Maximiliano Lugo  YOB: 1950  MRN: [id number]    Date of Service: 9/1/2022     Tentative placement recommendation: Subacute rehab or Long Term Acute Care  Equipment recommendation:  To be determined      Evaluating Physical Therapist: Sumit Posada PT, DPT #[id number]      Specific Provider Orders/Date/Referring Provider :     08/28/22 1430    PT eval and treat  Start:  08/28/22 1430,   End:  08/28/22 1430,   ONE TIME,   Standing Count:  1 Occurrences,   R         Miriam Oden, DO Acknowledge New     Admitting Diagnosis:   Atrial fibrillation with rapid ventricular response ([address] [I48.91]      Surgery: none  Visit Diagnoses         Codes    Atrial fibrillation with RVR ([address]     I48.91            Patient Active Problem List   Diagnosis    Depression with anxiety    Osteoporosis    Asthma    Hyperlipidemia    Mitral regurgitation    Obstructive sleep apnea syndrome    Psoriasis    Diabetes mellitus ([address]    Parkinson's disease ([address]    Primary hypertension    Microalbuminuria    Morbid obesity ([address]    RLS (restless legs syndrome)    Generalized weakness    Inability to walk    Hypothyroidism    Chest pain    Acute asthma exacerbation    Asthma exacerbation, mild    Paroxysmal atrial fibrillation (HCC)    Atrial fibrillation with RVR (HCC)    Acute on chronic congestive heart failure ([address]    Coronary artery disease involving native coronary artery of native heart without angina pectoris    Dysphagia    Hepatosplenomegaly    Acute decompensated heart failure (HCC)    Acute diastolic (congestive) heart failure (HCC)    Nonrheumatic tricuspid valve regurgitation    Tobacco abuse    Recurrent syncope    Septic shock ([address]    Seizure-like activity (HCC)    SHANTANU (acute kidney injury) ([address]    Pancytopenia ([address]    Thrombocytopenia ([address]    Encephalopathy    Acute respiratory failure with hypoxia ([address] Lactic acidosis    Delirium    Acute on chronic anemia    Pleural effusion, right    Acute respiratory failure with hypoxia and hypercapnia (HCC)    Acute confusion    Chronic diastolic (congestive) heart failure (HCC)    Macrocytosis    Other specified anemias    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[facility]    Puerperal sepsis with acute hypoxic respiratory failure without septic shock (HCC)    Pulmonary HTN (HCC)    Chronic anticoagulation    RVF (right ventricular failure) (HCC)    Metabolic alkalosis        ASSESSMENT of Current Deficits Patient exhibits decreased strength, balance, and endurance impairing functional mobility, transfers, gait , gait distance, and tolerance to activity. Patient tolerates inc in activity today, able to take steps to bedside commode. Patient benefits from use of wheeled walker for stability. Patient limited in gait distance due to fatigue. Patient needs continued PT to improve strength and endurance to tolerate and complete functional mobility with decreased assist required.         PHYSICAL THERAPY  PLAN OF CARE       Physical therapy plan of care is established based on physician order,  patient diagnosis and clinical assessment    Current Treatment Recommendations:    -Bed Mobility: Lower extremity exercises  and Trunk control activities   -Sitting Balance: Incorporate reaching activities to activate trunk muscles , Hands on support to maintain midline , Facilitate active trunk muscle engagement , Facilitate postural control in all planes , and Engage in core activities to allow for movement within base of support   -Standing Balance: Perform strengthening exercises in standing to promote motor control with or without upper extremity support , Instruct patient on adequate base of support to maintain balance, and Challenge balance utilizing reaching  activities beyond center of gravity    -Transfers: Provide instruction on proper hand and foot position for adequate transfer of weight onto lower extremities and use of gait device if needed, Cues for hand placement, technique and safety. Provide stabilization to prevent fall , Facilitate weight shift forward on to lower extremities and provide necessary stabilization of bilateral lower extremities , Support transfer of weight on to lower extremities, and Assist with extension of knees trunk and hip to accept weight transfer   -Gait: Gait training, Standing activities to improve: base of support, weight shift, weight bearing , Exercises to improve trunk control, Exercises to improve hip and knee control, Performance of protected weight bearing activities, and Activities to increase weight bearing   -Endurance: Utilize Supervised activities to increase level of endurance to allow for safe functional mobility including transfers and gait  and Use graduated activities to promote good breathing techniques and provide support and education to maximize respiratory function    PT long term treatment goals are located in below grid    Patient and or family understand(s) diagnosis, prognosis, and plan of care.     Frequency of treatments: Patient will be seen  dailyyy         Prior Level of Function: Patient ambulated with wheeled walker   Rehab Potential: good - for baseline    Past medical history:   Past Medical History:   Diagnosis Date    A-fib ([facility]     Acute on chronic congestive heart failure (HCC)     Anxiety     Asthma     CAD (coronary artery disease) 01/21/2016    Cancer ([facility] [address]  breast ca 2006    right lumpectomy    Chronic kidney disease     nephrolithiasis    Depression     Diabetes mellitus ([facility] [address]     H/O mammogram     Hx MRSA infection     toe infection january 2012    Hyperlipidemia     Hypertension     Lateral epicondylitis     SERENA on CPAP     Parkinson's disease ([facility] [address]     Tubal ligation status      Past Surgical History:   Procedure Laterality Date    BREAST LUMPECTOMY      BREAST REDUCTION SURGERY      CARDIAC CATHETERIZATION  4/28/2014 Independent    Supine to sit: Independent    Sit to supine: Independent    Scooting: Independent     Transfers Sit to stand: Minimal assist of 1  Sit to stand: Supervision x multiple reps    Sit to stand: Independent    Ambulation     3-5 feet using  wheeled walker with Minimal assist of 1   for walker control, walker approximation, balance, and safety 2x3-4 side steps, 2x3-4 steps using  wheeled walker with Minimal assist of 1   for balance, upright, and safety       > 75 feet using  wheeled walker with Modified Independent    Stair negotiation: ascended and descended   Not assessed  not assessed        ROM Within functional limits    Increase range of motion 10% of affected joints    Strength BUE:  refer to OT eval  RLE:  4-/5  LLE:  4-/5  Increase strength in affected mm groups by 1/3 grade   Balance Sitting EOB:  fair +  Dynamic Standing:  fair  Sitting EOB: good   Dynamic Standing: fair with wheeled walker   Sitting EOB:  good   Dynamic Standing: fair +     Patient is Alert & Oriented x person, place, time, and situation and follows directions    Sensation:  Patient  denies numbness/tingling   Edema:  no   Endurance: fair      Vitals:  1.5 liters nasal cannula   Blood Pressure at rest  Blood Pressure during session    Heart Rate at rest Heart Rate during session   SPO2 at rest %  SPO2 during session %     Patient education  Patient educated on role of Physical Therapy, risks of immobility, safety and plan of care, energy conservation,  importance of mobility while in hospital , purse lip breathing, and safety      Patient response to education:   Pt verbalized understanding Pt demonstrated skill Pt requires further education in this area   Yes Partial Yes      Treatment:  Patient practiced and was instructed/facilitated in the following treatment: Patient transferred to edge of bed and stood. Patient took steps to foot of bed and head of bed and took seated rest. Patient stood to take steps to bedside commode. Patient assisted on/off commode and with hygiene, assisted with donning depends. . Patient took steps back to bedside. Therapeutic Exercises:  not performed      At end of session, patient sitting edge of bed with     call light and phone within reach,  all lines and tubes intact, nursing notified. Patient would benefit from continued skilled Physical Therapy to improve functional independence and quality of life.          Patient's/ family goals   rehab    Time in  258  Time out  333    Total Treatment Time  35 minutes    CPT codes:  Therapeutic activities (33301)   35 minutes  2 unit(s)    Ventura Judge PTA [facility]#[id number]

## 2022-09-01 NOTE — PROGRESS NOTES
Used for Protein Requirements: Ideal  Protein (g/day): 80-90 (1.7-1.9 gm/kg)  Method Used for Fluid Requirements: 1 ml/kcal  Fluid (ml/day): 4303-9102    Nutrition Diagnosis:   Inadequate oral intake related to cognitive or neurological impairment (Parkinsions disease) as evidenced by intake 26-50%, wounds, swallow study results, lab values, poor dentition    Nutrition Interventions:   Food and/or Nutrient Delivery: Continue Current Diet, Continue Oral Nutrition Supplement  Nutrition Education/Counseling: No recommendation at this time  Coordination of Nutrition Care: Continue to monitor while inpatient     Goals:  Previous Goal Met: Progressing toward Goal(s)  Goals: PO intake 75% or greater     Nutrition Monitoring and Evaluation:   Behavioral-Environmental Outcomes: None Identified  Food/Nutrient Intake Outcomes: Food and Nutrient Intake, Supplement Intake  Physical Signs/Symptoms Outcomes: Biochemical Data, GI Status, Fluid Status or Edema, Weight, Skin, Nutrition Focused Physical Findings    Discharge Planning:     Too soon to determine     Ina Barba RD  Contact: [phone]

## 2022-09-01 NOTE — PROGRESS NOTES
Patient is seen in follow-up for atrial fibrillation    Date of service: 9/1/2022    Subjective:   No new overnight cardiac complaints. No chest pain or palpitations. Respiratory status improved since admission per patient. AF at rate 80's on telemetry. I/O's inaccurate (urine output more than documented).     ROS:  Cardiac: As per HPI  General: No fever, chills  Pulmonary: As per HPI  HEENT: No visual disturbances, difficult swallowing  GI: No nausea, vomiting  : No dysuria, hematuria  Endocrine: No thyroid disease, +DM  Musculoskeletal: HDZ x 4, no focal motor deficits  Skin: Intact, no rashes  Neuro: No headache, seizures  Psych: Currently with no depression, anxiety    Scheduled Meds:   bumetanide  1 mg Oral BID    metoprolol succinate  75 mg Oral BID    acetaZOLAMIDE  250 mg Oral Daily    predniSONE  40 mg Oral Daily    digoxin  125 mcg Oral Daily    sodium chloride flush  5-40 mL IntraVENous 2 times per day    heparin flush  1 mL IntraVENous 2 times per day    benzoin compound   Topical Nightly    insulin glargine  10 Units SubCUTAneous Nightly    insulin lispro  0-8 Units SubCUTAneous TID WC    insulin lispro  0-4 Units SubCUTAneous Nightly    insulin lispro  3 Units SubCUTAneous TID WC    aspirin EC  81 mg Oral Daily    apixaban  5 mg Oral BID    rOPINIRole  1 mg Oral TID    sucralfate  1 g Oral 4x Daily    pantoprazole  40 mg Oral QAM    montelukast  10 mg Oral Nightly    miconazole  1 each Topical BID    ipratropium-albuterol  1 vial Inhalation 4x Daily    carbidopa-levodopa  2 tablet Oral TID    budesonide  0.5 mg Nebulization BID    atorvastatin  20 mg Oral Nightly    sodium chloride flush  5-40 mL IntraVENous 2 times per day    melatonin  5 mg Oral Nightly     Continuous Infusions:   sodium chloride      dextrose       PRN Meds:sodium chloride flush, sodium chloride, heparin flush, mirtazapine, LORazepam, glucose, dextrose bolus **OR** dextrose bolus, glucagon (rDNA), dextrose, polyethylene glycol, acetaminophen **OR** acetaminophen    I/O last 3 completed shifts: In: 540 [P.O.:540]  Out: 1600 [Urine:1600]  I/O this shift: In: 68 [P.O.:60; I.V.:13]  Out: 1000 [Urine:1000]      Objective:      Physical Exam:   BP (!) 154/87   Pulse 88   Temp 98.4 °F (36.9 °C) (Oral)   Resp 20   Ht 5' (1.524 m)   Wt 213 lb 8 oz (96.8 kg)   SpO2 99%   BMI 41.70 kg/m²   Appearance: Awake, alert and oriented x 3, no acute respiratory distress  Skin: Intact, no rash  Head: Normocephalic, atraumatic  Eyes: EOMI, no conjunctival erythema  ENMT: No pharyngeal erythema, MMM, no rhinorrhea, no dentures  Neck: Supple, no carotid bruits  Lungs: Decreased BS B/L, no wheezing  Cardiac: IRRR, no murmurs apparent  Abdomen: Soft, nontender, +bowel sounds  Extremities: Moves all extremities x 4, no lower extremity edema  Neurologic: No focal motor deficits apparent, normal mood and affect, alert and oriented x 3    Imaging  XR CHEST PORTABLE   Final Result   1. Small right pleural effusion. 2.  No other significant interval change. IR GUIDED THORACENTESIS PLEURAL   Final Result   Successful ultrasound guided thoracentesis. XR CHEST 1 VIEW   Final Result   1. There is no right pneumothorax status post right thoracentesis   2. Right lung base atelectasis   3. The left lung is clear. XR CHEST PORTABLE   Final Result   1. Cardiomegaly with findings of CHF   2. Moderate size right pleural effusion   3.  Trace left pleural effusion             Lab Review   Lab Results   Component Value Date    WBC 9.0 08/25/2022    HGB 9.0 (L) 08/25/2022    HCT 31.5 (L) 08/25/2022    MCV 98.4 08/25/2022     08/25/2022     Lab Results   Component Value Date    CREATININE 1.0 09/01/2022    BUN 26 (H) 09/01/2022     09/01/2022    K 3.7 09/01/2022    CL 90 (L) 09/01/2022    CO2 46 (HH) 09/01/2022     Lab Results   Component Value Date    TSH 4.580 (H) 07/06/2022     Lab Results   Component Value Date    LABA1C 6.1 (H) 08/24/2022     No results found for: EAG    Lab Results   Component Value Date    DIGOXIN 0.6 (L) 08/30/2022     Telemetry personally reviewed (9/1/2022): AF, rate 80's    Cardiac catheterization: 1/11/22 (Dr. Vinicio Rawls)  Left main: 0%  stenosis  LAD: 50 %  stenosis  Circumflex: 50 %   stenosis  RCA: Co dominant. 0 %  stenosis  LV angio: 75-80%  ejection fraction    Echocardiogram: 7/7/22 (Dr. Jen Chan)   Technically difficult study - limited visualization. Micro-bubble contrast injected to enhance left ventricular visualization. Normal left ventricular size and systolic function. Ejection fraction is visually estimated at 70-75%. Indeterminate diastolic function. No regional wall motion abnormalities seen. Mild left ventricular concentric hypertrophy noted. Moderately dilated right ventricle with reduced function. Biatrial dilation. Mild tricuspid regurgitation. RVSP is at least 60 mmHg. Prominent pericardial fat pad. No definitive evidence of pericardial effusion. Assessment:     1. Atrial fibrillation with episodes of RVR / persistent atrial fibrillation -- multiple prior CVN's and recurrence of AF --> rate controlled  2. Acute hypoxic respiratory failure: acute decompensated heart failure, COPD  3. Acute on chronic diastolic congestive heart failure  4. Bilateral pleural effusion -- s/p right sided thoracentesis (900 cc) on 8/26/22  5. CAD: Nonobstructive involving LAD and left circumflex artery  6. Mitral valve regurgitation:   7. Tricuspid valve regurgitation: Mild  8. Pulmonary hypertension  9. Hypertension: Controlled  10. Hyperlipidemia  11. Type 2 diabetes mellitus  12. Chronic anemia -- most recent Hgb 9.0  13. Chronic OAC with eliquis  14, SERENA -- noncompliant with treatment  15.  Tobacco abuse  16, BMI 45.4    Recommendations:     Monitor BMP and I/O's (inaccurate) with ongoing diuresis -- switched from IV bumex to po bumex on 8/31/22; diamox added by primary service on 8/31/22 BID. Monitor BMP. Increased BB dose and switched to Toprol XL (50 mg BID) on 8/29/22 --> increased dose to 75 mg BID on 8/31/22 (continue)  Continue eliquis 5 mg BID  Checked digoxin level -- 0.6 (monitor)  Aggressive risk factor modifications / treatment of SERENA as indicated  Care per pulmonary  Telemetry reviewed (as per above)    Greater than 35 minutes was spent counseling the patient, reviewing the rationale for the above recommendations and reviewing the patient's current medication list, problem list and results of all previously ordered testing.     Joi Sotelo MD  [facility]) Cardiology

## 2022-09-01 NOTE — PROGRESS NOTES
[facility] Progress Note    Admitting Date and Time: 8/24/2022 11:55 AM  Admit Dx: Atrial fibrillation with rapid ventricular response ([facility] [address] [I48.91]    Subjective:  Patient is being followed for Atrial fibrillation with rapid ventricular response ([facility] [address] [I48.91]   Pt feels fine, breathing stable, occasional dry cough, no  fever/chills. Per RN: patient still refusing ABG    ROS: denies fever, chills, cp, sob, n/v, HA unless stated above.       bumetanide  1 mg Oral BID    metoprolol succinate  75 mg Oral BID    acetaZOLAMIDE  250 mg Oral Daily    predniSONE  40 mg Oral Daily    digoxin  125 mcg Oral Daily    sodium chloride flush  5-40 mL IntraVENous 2 times per day    heparin flush  1 mL IntraVENous 2 times per day    benzoin compound   Topical Nightly    insulin glargine  10 Units SubCUTAneous Nightly    insulin lispro  0-8 Units SubCUTAneous TID WC    insulin lispro  0-4 Units SubCUTAneous Nightly    insulin lispro  3 Units SubCUTAneous TID WC    aspirin EC  81 mg Oral Daily    apixaban  5 mg Oral BID    rOPINIRole  1 mg Oral TID    sucralfate  1 g Oral 4x Daily    pantoprazole  40 mg Oral QAM    montelukast  10 mg Oral Nightly    miconazole  1 each Topical BID    ipratropium-albuterol  1 vial Inhalation 4x Daily    carbidopa-levodopa  2 tablet Oral TID    budesonide  0.5 mg Nebulization BID    atorvastatin  20 mg Oral Nightly    sodium chloride flush  5-40 mL IntraVENous 2 times per day    melatonin  5 mg Oral Nightly     sodium chloride flush, 5-40 mL, PRN  sodium chloride, , PRN  heparin flush, 1 mL, PRN  mirtazapine, 15 mg, QHS PRN  LORazepam, 0.5 mg, Q12H PRN  glucose, 4 tablet, PRN  dextrose bolus, 125 mL, PRN   Or  dextrose bolus, 250 mL, PRN  glucagon (rDNA), 1 mg, PRN  dextrose, , Continuous PRN  polyethylene glycol, 17 g, Daily PRN  acetaminophen, 650 mg, Q6H PRN   Or  acetaminophen, 650 mg, Q6H PRN       Objective:    BP (!) 154/87   Pulse 88   Temp 98.4 °F (36.9 °C) (Oral) Resp 20   Ht 5' (1.524 m)   Wt 213 lb 8 oz (96.8 kg)   SpO2 99%   BMI 41.70 kg/m²     General Appearance: alert and oriented to person, place and time and in no acute distress, morbidly obese  Skin: warm and dry  Head: normocephalic and atraumatic  Eyes: pupils equal, round, and reactive to light, extraocular eye movements intact, conjunctivae normal  Neck: neck supple and non tender without mass   Pulmonary/Chest: clear to auscultation bilaterally- no wheezes, rales or rhonchi, normal air movement, no respiratory distress  Cardiovascular: normal rate, normal S1 and S2 and no carotid bruits  Abdomen: soft, non-tender, non-distended, normal bowel sounds, no masses or organomegaly  Extremities: no cyanosis, no clubbing and no edema  Neurologic: no cranial nerve deficit and speech normal        Recent Labs     08/30/22  1308 08/31/22  0617 09/01/22  0505    144 144   K 4.0 4.1 3.7   CL 92* 91* 90*   CO2 43* >50* 46*   BUN 26* 26* 26*   CREATININE 0.9 1.0 1.0   GLUCOSE 205* 162* 169*   CALCIUM 9.2 8.6 8.8       No results for input(s): WBC, RBC, HGB, HCT, MCV, MCH, MCHC, RDW, PLT, MPV in the last 72 hours. Radiology:   XR CHEST PORTABLE    Result Date: 8/29/2022  EXAMINATION: ONE XRAY VIEW OF THE CHEST 8/29/2022 6:34 am COMPARISON: 08/26/2022 HISTORY: ORDERING SYSTEM PROVIDED HISTORY: SOB TECHNOLOGIST PROVIDED HISTORY: Reason for exam:->SOB FINDINGS: Heart is upper limits of normal in size. There is reaccumulation of a small right pleural effusion which appears partially loculated in the major fissure. No pneumothorax. No new alveolar consolidation. Osseous structures are stable. Unchanged postop change in the right chest wall soft tissues. 1.  Small right pleural effusion. 2.  No other significant interval change.       Assessment:    Principal Problem:    Acute respiratory failure with hypoxia and hypercapnia (HCC)  Active Problems:    Acute decompensated heart failure (HCC)    Encephalopathy however, inadvertent computerized transcription errors may be present.     Electronically signed by Collins Case MD on 9/1/2022 at 1:23 PM

## 2022-09-01 NOTE — PROGRESS NOTES
Date:2022  Patient Name: Corene Landau  MRN: [id number]  : 1950  ROOM #: [id number]    Occupational Therapy treatment attempted this PM. Patient is unavailable for OT treatment due to working with physical therapy assistant. Will re-attempt OT treatment at a later time. Thank you.      Madisyn Henriquez OTR/L #[id number]

## 2022-09-02 ENCOUNTER — APPOINTMENT (OUTPATIENT)
Dept: GENERAL RADIOLOGY | Age: 72
DRG: 189 | End: 2022-09-02
Payer: MEDICARE

## 2022-09-02 LAB
ANION GAP SERPL CALCULATED.3IONS-SCNC: 7 MMOL/L (ref 7–16)
BUN BLDV-MCNC: 32 MG/DL (ref 6–23)
CALCIUM SERPL-MCNC: 9 MG/DL (ref 8.6–10.2)
CHLORIDE BLD-SCNC: 91 MMOL/L (ref 98–107)
CO2: 43 MMOL/L (ref 22–29)
CREAT SERPL-MCNC: 1.1 MG/DL (ref 0.5–1)
GFR AFRICAN AMERICAN: 59
GFR NON-AFRICAN AMERICAN: 49 ML/MIN/1.73
GLUCOSE BLD-MCNC: 241 MG/DL (ref 74–99)
METER GLUCOSE: 227 MG/DL (ref 74–99)
METER GLUCOSE: 286 MG/DL (ref 74–99)
METER GLUCOSE: 305 MG/DL (ref 74–99)
METER GLUCOSE: 312 MG/DL (ref 74–99)
POTASSIUM SERPL-SCNC: 3.5 MMOL/L (ref 3.5–5)
SODIUM BLD-SCNC: 141 MMOL/L (ref 132–146)

## 2022-09-02 PROCEDURE — 94640 AIRWAY INHALATION TREATMENT: CPT

## 2022-09-02 PROCEDURE — 6360000002 HC RX W HCPCS: Performed by: FAMILY MEDICINE

## 2022-09-02 PROCEDURE — 80048 BASIC METABOLIC PNL TOTAL CA: CPT

## 2022-09-02 PROCEDURE — 6370000000 HC RX 637 (ALT 250 FOR IP): Performed by: INTERNAL MEDICINE

## 2022-09-02 PROCEDURE — 36592 COLLECT BLOOD FROM PICC: CPT

## 2022-09-02 PROCEDURE — 2700000000 HC OXYGEN THERAPY PER DAY

## 2022-09-02 PROCEDURE — 97530 THERAPEUTIC ACTIVITIES: CPT

## 2022-09-02 PROCEDURE — 6370000000 HC RX 637 (ALT 250 FOR IP): Performed by: FAMILY MEDICINE

## 2022-09-02 PROCEDURE — 2580000003 HC RX 258: Performed by: FAMILY MEDICINE

## 2022-09-02 PROCEDURE — 36415 COLL VENOUS BLD VENIPUNCTURE: CPT

## 2022-09-02 PROCEDURE — 71045 X-RAY EXAM CHEST 1 VIEW: CPT

## 2022-09-02 PROCEDURE — 2060000000 HC ICU INTERMEDIATE R&B

## 2022-09-02 PROCEDURE — 6370000000 HC RX 637 (ALT 250 FOR IP): Performed by: STUDENT IN AN ORGANIZED HEALTH CARE EDUCATION/TRAINING PROGRAM

## 2022-09-02 PROCEDURE — 82962 GLUCOSE BLOOD TEST: CPT

## 2022-09-02 PROCEDURE — 99232 SBSQ HOSP IP/OBS MODERATE 35: CPT | Performed by: INTERNAL MEDICINE

## 2022-09-02 PROCEDURE — 99232 SBSQ HOSP IP/OBS MODERATE 35: CPT | Performed by: STUDENT IN AN ORGANIZED HEALTH CARE EDUCATION/TRAINING PROGRAM

## 2022-09-02 RX ORDER — BENZONATATE 100 MG/1
100 CAPSULE ORAL 3 TIMES DAILY PRN
Status: DISCONTINUED | OUTPATIENT
Start: 2022-09-02 | End: 2022-09-08 | Stop reason: HOSPADM

## 2022-09-02 RX ORDER — INSULIN GLARGINE 100 [IU]/ML
12 INJECTION, SOLUTION SUBCUTANEOUS 2 TIMES DAILY
Status: DISCONTINUED | OUTPATIENT
Start: 2022-09-02 | End: 2022-09-06

## 2022-09-02 RX ADMIN — CARBIDOPA AND LEVODOPA 2 TABLET: 25; 100 TABLET, EXTENDED RELEASE ORAL at 13:58

## 2022-09-02 RX ADMIN — INSULIN LISPRO 3 UNITS: 100 INJECTION, SOLUTION INTRAVENOUS; SUBCUTANEOUS at 08:58

## 2022-09-02 RX ADMIN — Medication 5 MG: at 21:12

## 2022-09-02 RX ADMIN — BUMETANIDE 1 MG: 1 TABLET ORAL at 20:54

## 2022-09-02 RX ADMIN — INSULIN LISPRO 2 UNITS: 100 INJECTION, SOLUTION INTRAVENOUS; SUBCUTANEOUS at 08:58

## 2022-09-02 RX ADMIN — INSULIN GLARGINE 10 UNITS: 100 INJECTION, SOLUTION SUBCUTANEOUS at 08:58

## 2022-09-02 RX ADMIN — SUCRALFATE 1 G: 1 TABLET ORAL at 16:49

## 2022-09-02 RX ADMIN — DIGOXIN 125 MCG: 125 TABLET ORAL at 08:54

## 2022-09-02 RX ADMIN — ASPIRIN 81 MG: 81 TABLET, COATED ORAL at 08:55

## 2022-09-02 RX ADMIN — APIXABAN 5 MG: 5 TABLET, FILM COATED ORAL at 08:55

## 2022-09-02 RX ADMIN — ROPINIROLE HYDROCHLORIDE 1 MG: 1 TABLET, FILM COATED ORAL at 20:54

## 2022-09-02 RX ADMIN — INSULIN LISPRO 4 UNITS: 100 INJECTION, SOLUTION INTRAVENOUS; SUBCUTANEOUS at 20:56

## 2022-09-02 RX ADMIN — Medication 10 ML: at 08:57

## 2022-09-02 RX ADMIN — BUMETANIDE 1 MG: 1 TABLET ORAL at 08:55

## 2022-09-02 RX ADMIN — PREDNISONE 40 MG: 20 TABLET ORAL at 08:55

## 2022-09-02 RX ADMIN — Medication 10 ML: at 08:56

## 2022-09-02 RX ADMIN — HEPARIN 100 UNITS: 100 SYRINGE at 20:54

## 2022-09-02 RX ADMIN — BUDESONIDE 500 MCG: 0.5 SUSPENSION RESPIRATORY (INHALATION) at 06:49

## 2022-09-02 RX ADMIN — INSULIN LISPRO 3 UNITS: 100 INJECTION, SOLUTION INTRAVENOUS; SUBCUTANEOUS at 16:48

## 2022-09-02 RX ADMIN — ANTI-FUNGAL POWDER MICONAZOLE NITRATE TALC FREE 1 EACH: 1.42 POWDER TOPICAL at 20:55

## 2022-09-02 RX ADMIN — CARBIDOPA AND LEVODOPA 2 TABLET: 25; 100 TABLET, EXTENDED RELEASE ORAL at 20:54

## 2022-09-02 RX ADMIN — INSULIN LISPRO 3 UNITS: 100 INJECTION, SOLUTION INTRAVENOUS; SUBCUTANEOUS at 12:34

## 2022-09-02 RX ADMIN — BENZOIN RESIN: 1000 LIQUID TOPICAL at 20:56

## 2022-09-02 RX ADMIN — METOPROLOL SUCCINATE 75 MG: 50 TABLET, EXTENDED RELEASE ORAL at 20:53

## 2022-09-02 RX ADMIN — ANTI-FUNGAL POWDER MICONAZOLE NITRATE TALC FREE 1 EACH: 1.42 POWDER TOPICAL at 08:57

## 2022-09-02 RX ADMIN — ROPINIROLE HYDROCHLORIDE 1 MG: 1 TABLET, FILM COATED ORAL at 08:55

## 2022-09-02 RX ADMIN — APIXABAN 5 MG: 5 TABLET, FILM COATED ORAL at 20:53

## 2022-09-02 RX ADMIN — POLYETHYLENE GLYCOL 3350 17 G: 17 POWDER, FOR SOLUTION ORAL at 14:03

## 2022-09-02 RX ADMIN — LORAZEPAM 0.5 MG: 0.5 TABLET ORAL at 23:26

## 2022-09-02 RX ADMIN — IPRATROPIUM BROMIDE AND ALBUTEROL SULFATE 3 ML: .5; 2.5 SOLUTION RESPIRATORY (INHALATION) at 06:49

## 2022-09-02 RX ADMIN — CARBIDOPA AND LEVODOPA 2 TABLET: 25; 100 TABLET, EXTENDED RELEASE ORAL at 08:56

## 2022-09-02 RX ADMIN — PANTOPRAZOLE SODIUM 40 MG: 40 TABLET, DELAYED RELEASE ORAL at 08:54

## 2022-09-02 RX ADMIN — INSULIN LISPRO 6 UNITS: 100 INJECTION, SOLUTION INTRAVENOUS; SUBCUTANEOUS at 12:33

## 2022-09-02 RX ADMIN — SUCRALFATE 1 G: 1 TABLET ORAL at 08:55

## 2022-09-02 RX ADMIN — Medication 10 ML: at 20:55

## 2022-09-02 RX ADMIN — ACETAZOLAMIDE 250 MG: 250 TABLET ORAL at 08:55

## 2022-09-02 RX ADMIN — SUCRALFATE 1 G: 1 TABLET ORAL at 20:54

## 2022-09-02 RX ADMIN — IPRATROPIUM BROMIDE AND ALBUTEROL SULFATE 3 ML: .5; 2.5 SOLUTION RESPIRATORY (INHALATION) at 09:53

## 2022-09-02 RX ADMIN — ROPINIROLE HYDROCHLORIDE 1 MG: 1 TABLET, FILM COATED ORAL at 13:58

## 2022-09-02 RX ADMIN — HEPARIN 100 UNITS: 100 SYRINGE at 09:05

## 2022-09-02 RX ADMIN — SUCRALFATE 1 G: 1 TABLET ORAL at 12:30

## 2022-09-02 RX ADMIN — INSULIN GLARGINE 12 UNITS: 100 INJECTION, SOLUTION SUBCUTANEOUS at 20:56

## 2022-09-02 RX ADMIN — METOPROLOL SUCCINATE 75 MG: 50 TABLET, EXTENDED RELEASE ORAL at 08:55

## 2022-09-02 RX ADMIN — INSULIN LISPRO 4 UNITS: 100 INJECTION, SOLUTION INTRAVENOUS; SUBCUTANEOUS at 16:36

## 2022-09-02 RX ADMIN — ATORVASTATIN CALCIUM 20 MG: 20 TABLET, FILM COATED ORAL at 20:54

## 2022-09-02 NOTE — PLAN OF CARE
Problem: Discharge Planning  Goal: Discharge to home or other facility with appropriate resources  Outcome: Progressing     Problem: ABCDS Injury Assessment  Goal: Absence of physical injury  Outcome: Progressing     Problem: Safety - Adult  Goal: Free from fall injury  Outcome: Progressing  Flowsheets (Taken 9/2/2022 0115)  Free From Fall Injury: Instruct family/caregiver on patient safety     Problem: Skin/Tissue Integrity  Goal: Absence of new skin breakdown  Description: 1. Monitor for areas of redness and/or skin breakdown  2. Assess vascular access sites hourly  3. Every 4-6 hours minimum:  Change oxygen saturation probe site  4. Every 4-6 hours:  If on nasal continuous positive airway pressure, respiratory therapy assess nares and determine need for appliance change or resting period.   Outcome: Progressing     Problem: Chronic Conditions and Co-morbidities  Goal: Patient's chronic conditions and co-morbidity symptoms are monitored and maintained or improved  Outcome: Progressing     Problem: Pain  Goal: Verbalizes/displays adequate comfort level or baseline comfort level  Outcome: Progressing     Problem: Nutrition Deficit:  Goal: Optimize nutritional status  Outcome: Progressing  Flowsheets (Taken 9/1/2022 1239 by Francena Pallas, RD)  Nutrient intake appropriate for improving, restoring, or maintaining nutritional needs:   Assess nutritional status and recommend course of action   Monitor oral intake, labs, and treatment plans   Recommend appropriate diets, oral nutritional supplements, and vitamin/mineral supplements

## 2022-09-02 NOTE — CARE COORDINATION
9/2/2022 1010 CM Note:  CM for transition of care needs at d/c. Pt resides at [address] [place] [address] and will return there. Per [facility] she is a long term resident and a bed hold, if skilled need at d/c they will submit for precert but won't have to wait for auth. Per [facility] at [address] they can get her a bipap at the facility if needed at d/c. Will need signed [place] and Rapid Covid day of d/c. Per Dr Deri Meckel pt is being treat for metabolic alkalosis and is improving but pt not ready for d/c, but may possibly ready over the weekend. Karlee liaison at [address] aware that she may d/c over the weekend. CM to follow.   Eileen Garcia RN

## 2022-09-02 NOTE — PROGRESS NOTES
[facility] Progress Note    Admitting Date and Time: 8/24/2022 11:55 AM  Admit Dx: Atrial fibrillation with rapid ventricular response ([facility] [I48.91]    Subjective:  Patient is being followed for Atrial fibrillation with rapid ventricular response ([facility] [address] [I48.91]   Pt feels fine, breathing stable, occasional dry cough still and would likely something to help reduce it. She denies any fever/chills. Per RN: nothing to report    ROS: denies fever, chills, cp, sob, n/v, HA unless stated above.       insulin glargine  10 Units SubCUTAneous BID    bumetanide  1 mg Oral BID    metoprolol succinate  75 mg Oral BID    acetaZOLAMIDE  250 mg Oral Daily    predniSONE  40 mg Oral Daily    digoxin  125 mcg Oral Daily    sodium chloride flush  5-40 mL IntraVENous 2 times per day    heparin flush  1 mL IntraVENous 2 times per day    benzoin compound   Topical Nightly    insulin lispro  0-8 Units SubCUTAneous TID WC    insulin lispro  0-4 Units SubCUTAneous Nightly    insulin lispro  3 Units SubCUTAneous TID WC    aspirin EC  81 mg Oral Daily    apixaban  5 mg Oral BID    rOPINIRole  1 mg Oral TID    sucralfate  1 g Oral 4x Daily    pantoprazole  40 mg Oral QAM    montelukast  10 mg Oral Nightly    miconazole  1 each Topical BID    ipratropium-albuterol  1 vial Inhalation 4x Daily    carbidopa-levodopa  2 tablet Oral TID    budesonide  0.5 mg Nebulization BID    atorvastatin  20 mg Oral Nightly    sodium chloride flush  5-40 mL IntraVENous 2 times per day    melatonin  5 mg Oral Nightly     benzonatate, 100 mg, TID PRN  sodium chloride flush, 5-40 mL, PRN  sodium chloride, , PRN  heparin flush, 1 mL, PRN  mirtazapine, 15 mg, QHS PRN  LORazepam, 0.5 mg, Q12H PRN  glucose, 4 tablet, PRN  dextrose bolus, 125 mL, PRN   Or  dextrose bolus, 250 mL, PRN  glucagon (rDNA), 1 mg, PRN  dextrose, , Continuous PRN  polyethylene glycol, 17 g, Daily PRN  acetaminophen, 650 mg, Q6H PRN   Or  acetaminophen, 650 mg, Q6H PRN Objective:    BP (!) 140/78   Pulse 95   Temp 97.8 °F (36.6 °C)   Resp 18   Ht 5' (1.524 m)   Wt 213 lb 1 oz (96.6 kg)   SpO2 100%   BMI 41.61 kg/m²     General Appearance: alert and oriented to person, place and time and in no acute distress, morbidly obese  Skin: warm and dry  Head: normocephalic and atraumatic  Eyes: pupils equal, round, and reactive to light, extraocular eye movements intact, conjunctivae normal  Neck: neck supple and non tender without mass   Pulmonary/Chest: clear to auscultation bilaterally- no wheezes, rales or rhonchi, normal air movement, no respiratory distress   Cardiovascular: normal rate, normal S1 and S2 and no carotid bruits  Abdomen: soft, non-tender, non-distended, normal bowel sounds, no masses or organomegaly  Extremities: no cyanosis, no clubbing and trace pitting edema b/l LE  Neurologic: no cranial nerve deficit and speech normal        Recent Labs     08/31/22  0617 09/01/22  0505 09/02/22  0522    144 141   K 4.1 3.7 3.5   CL 91* 90* 91*   CO2 >50* 46* 43*   BUN 26* 26* 32*   CREATININE 1.0 1.0 1.1*   GLUCOSE 162* 169* 241*   CALCIUM 8.6 8.8 9.0       No results for input(s): WBC, RBC, HGB, HCT, MCV, MCH, MCHC, RDW, PLT, MPV in the last 72 hours. Radiology:   XR CHEST PORTABLE    Result Date: 8/29/2022  EXAMINATION: ONE XRAY VIEW OF THE CHEST 8/29/2022 6:34 am COMPARISON: 08/26/2022 HISTORY: ORDERING SYSTEM PROVIDED HISTORY: SOB TECHNOLOGIST PROVIDED HISTORY: Reason for exam:->SOB FINDINGS: Heart is upper limits of normal in size. There is reaccumulation of a small right pleural effusion which appears partially loculated in the major fissure. No pneumothorax. No new alveolar consolidation. Osseous structures are stable. Unchanged postop change in the right chest wall soft tissues. 1.  Small right pleural effusion. 2.  No other significant interval change.       Assessment:    Principal Problem:    Acute respiratory failure with hypoxia and care time:  25 minutes    Patient is able to go back to SNF she came from, precert needs to be started but not accepted prior to going there (patient is a bed hold). NOTE: This report was transcribed using voice recognition software. Every effort was made to ensure accuracy; however, inadvertent computerized transcription errors may be present.     Electronically signed by Moses Rodriguez MD on 9/2/2022 at 5:10 PM

## 2022-09-02 NOTE — PROGRESS NOTES
Patient is seen in follow-up for atrial fibrillation    Date of service: 9/2/2022    Subjective:   No new overnight cardiac complaints. No chest pain or palpitations. Respiratory status improved since admission per patient. AF at rate 90's on telemetry. I/O's inaccurate (urine output more than documented).     ROS:  Cardiac: As per HPI  General: No fever, chills  Pulmonary: As per HPI  HEENT: No visual disturbances, difficult swallowing  GI: No nausea, vomiting  : No dysuria, hematuria  Endocrine: No thyroid disease, +DM  Musculoskeletal: HDZ x 4, no focal motor deficits  Skin: Intact, no rashes  Neuro: No headache, seizures  Psych: Currently with no depression, anxiety    Scheduled Meds:   insulin glargine  10 Units SubCUTAneous BID    bumetanide  1 mg Oral BID    metoprolol succinate  75 mg Oral BID    acetaZOLAMIDE  250 mg Oral Daily    predniSONE  40 mg Oral Daily    digoxin  125 mcg Oral Daily    sodium chloride flush  5-40 mL IntraVENous 2 times per day    heparin flush  1 mL IntraVENous 2 times per day    benzoin compound   Topical Nightly    insulin lispro  0-8 Units SubCUTAneous TID WC    insulin lispro  0-4 Units SubCUTAneous Nightly    insulin lispro  3 Units SubCUTAneous TID WC    aspirin EC  81 mg Oral Daily    apixaban  5 mg Oral BID    rOPINIRole  1 mg Oral TID    sucralfate  1 g Oral 4x Daily    pantoprazole  40 mg Oral QAM    montelukast  10 mg Oral Nightly    miconazole  1 each Topical BID    ipratropium-albuterol  1 vial Inhalation 4x Daily    carbidopa-levodopa  2 tablet Oral TID    budesonide  0.5 mg Nebulization BID    atorvastatin  20 mg Oral Nightly    sodium chloride flush  5-40 mL IntraVENous 2 times per day    melatonin  5 mg Oral Nightly     Continuous Infusions:   sodium chloride      dextrose       PRN Meds:benzonatate, sodium chloride flush, sodium chloride, heparin flush, mirtazapine, LORazepam, glucose, dextrose bolus **OR** dextrose bolus, glucagon (rDNA), dextrose, polyethylene glycol, acetaminophen **OR** acetaminophen    I/O last 3 completed shifts: In: 68 [P.O.:60; I.V.:13]  Out: 3850 [Urine:3850]  No intake/output data recorded. Objective:      Physical Exam:   /77   Pulse 84   Temp 97.7 °F (36.5 °C) (Temporal)   Resp 18   Ht 5' (1.524 m)   Wt 213 lb 1 oz (96.6 kg)   SpO2 100%   BMI 41.61 kg/m²   Appearance: Awake, alert and oriented x 3, no acute respiratory distress  Skin: Intact, no rash  Head: Normocephalic, atraumatic  Eyes: EOMI, no conjunctival erythema  ENMT: No pharyngeal erythema, MMM, no rhinorrhea, no dentures  Neck: Supple, no carotid bruits  Lungs: Decreased BS B/L, no wheezing  Cardiac: IRRR, no murmurs apparent  Abdomen: Soft, nontender, +bowel sounds  Extremities: Moves all extremities x 4, no lower extremity edema  Neurologic: No focal motor deficits apparent, normal mood and affect, alert and oriented x 3    Imaging  XR CHEST PORTABLE   Final Result   1. Small right pleural effusion. 2.  No other significant interval change. IR GUIDED THORACENTESIS PLEURAL   Final Result   Successful ultrasound guided thoracentesis. XR CHEST 1 VIEW   Final Result   1. There is no right pneumothorax status post right thoracentesis   2. Right lung base atelectasis   3. The left lung is clear. XR CHEST PORTABLE   Final Result   1. Cardiomegaly with findings of CHF   2. Moderate size right pleural effusion   3.  Trace left pleural effusion         XR CHEST PORTABLE    (Results Pending)       Lab Review   Lab Results   Component Value Date    WBC 9.0 08/25/2022    HGB 9.0 (L) 08/25/2022    HCT 31.5 (L) 08/25/2022    MCV 98.4 08/25/2022     08/25/2022     Lab Results   Component Value Date    CREATININE 1.1 (H) 09/02/2022    BUN 32 (H) 09/02/2022     09/02/2022    K 3.5 09/02/2022    CL 91 (L) 09/02/2022    CO2 43 (HH) 09/02/2022     Lab Results   Component Value Date    TSH 4.580 (H) 07/06/2022     Lab Results   Component Value Date    LABA1C 6.1 (H) 08/24/2022     No results found for: EAG    Lab Results   Component Value Date    DIGOXIN 0.6 (L) 08/30/2022     Telemetry personally reviewed (9/2/2022): AF, rate 90's    Cardiac catheterization: 1/11/22 (Dr. Rabia Hoffman)  Left main: 0%  stenosis  LAD: 50 %  stenosis  Circumflex: 50 %   stenosis  RCA: Co dominant. 0 %  stenosis  LV angio: 75-80%  ejection fraction    Echocardiogram: 7/7/22 (Dr. Sarah Alva)   Technically difficult study - limited visualization. Micro-bubble contrast injected to enhance left ventricular visualization. Normal left ventricular size and systolic function. Ejection fraction is visually estimated at 70-75%. Indeterminate diastolic function. No regional wall motion abnormalities seen. Mild left ventricular concentric hypertrophy noted. Moderately dilated right ventricle with reduced function. Biatrial dilation. Mild tricuspid regurgitation. RVSP is at least 60 mmHg. Prominent pericardial fat pad. No definitive evidence of pericardial effusion. Assessment:     1. Atrial fibrillation with episodes of RVR / persistent atrial fibrillation -- multiple prior CVN's and recurrence of AF --> rate controlled  2. Acute hypoxic respiratory failure: acute decompensated heart failure, COPD  3. Acute on chronic diastolic congestive heart failure  4. Bilateral pleural effusion -- s/p right sided thoracentesis (900 cc) on 8/26/22  5. CAD: Nonobstructive involving LAD and left circumflex artery  6. Mitral valve regurgitation:   7. Tricuspid valve regurgitation: Mild  8. Pulmonary hypertension  9. Hypertension: Controlled  10. Hyperlipidemia  11. Type 2 diabetes mellitus  12. Chronic anemia -- most recent Hgb 9.0  13. Chronic OAC with eliquis  14, SERENA -- noncompliant with treatment  15.  Tobacco abuse  16, BMI 45.4    Recommendations:     Monitor BMP with ongoing diuresis (I/O's inaccurate)  -- switched from IV bumex to po bumex on 8/31/22; diamox added by primary service on 8/31/22 BID  Increased BB dose and switched to Toprol XL (50 mg BID) on 8/29/22 --> increased dose to 75 mg BID on 8/31/22 (continue)  Continue eliquis 5 mg BID  Checked digoxin level -- 0.6 (monitor)  Aggressive risk factor modifications / treatment of SERENA as indicated  Care per pulmonary  Telemetry reviewed (as per above)  Will follow CALI Gomez MD  [facility]) Cardiology

## 2022-09-02 NOTE — PROGRESS NOTES
Physical Therapy  Physical Therapy Treatment Note/Plan of Care    Room #:  0926/8427-95  Patient Name: Anastacio Ley  YOB: 1950  MRN: [id number]    Date of Service: 9/2/2022     Tentative placement recommendation: Subacute rehab or Long Term Acute Care  Equipment recommendation:  To be determined      Evaluating Physical Therapist: Torey Edgar PT, DPT #[id number]      Specific Provider Orders/Date/Referring Provider :     08/28/22 1430    PT eval and treat  Start:  08/28/22 1430,   End:  08/28/22 1430,   ONE TIME,   Standing Count:  1 Occurrences,   R         Baldo English, DO Acknowledge New     Admitting Diagnosis:   Atrial fibrillation with rapid ventricular response ([address] [I48.91]      Surgery: none  Visit Diagnoses         Codes    Atrial fibrillation with RVR ([address]     I48.91            Patient Active Problem List   Diagnosis    Depression with anxiety    Osteoporosis    Asthma    Hyperlipidemia    Mitral regurgitation    Obstructive sleep apnea syndrome    Psoriasis    Diabetes mellitus ([address]    Parkinson's disease ([address]    Primary hypertension    Microalbuminuria    Morbid obesity ([address]    RLS (restless legs syndrome)    Generalized weakness    Inability to walk    Hypothyroidism    Chest pain    Acute asthma exacerbation    Asthma exacerbation, mild    Paroxysmal atrial fibrillation (HCC)    Atrial fibrillation with RVR (HCC)    Acute on chronic congestive heart failure ([address]    Coronary artery disease involving native coronary artery of native heart without angina pectoris    Dysphagia    Hepatosplenomegaly    Acute decompensated heart failure (HCC)    Acute diastolic (congestive) heart failure (HCC)    Nonrheumatic tricuspid valve regurgitation    Tobacco abuse    Recurrent syncope    Septic shock ([address]    Seizure-like activity (HCC)    SHANTANU (acute kidney injury) ([address]    Pancytopenia ([address]    Thrombocytopenia ([address]    Encephalopathy    Acute respiratory failure with hypoxia ([address] Lactic acidosis    Delirium    Acute on chronic anemia    Pleural effusion, right    Acute respiratory failure with hypoxia and hypercapnia (HCC)    Acute confusion    Chronic diastolic (congestive) heart failure (HCC)    Macrocytosis    Other specified anemias    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[facility] [address]    Puerperal sepsis with acute hypoxic respiratory failure without septic shock (HCC)    Pulmonary HTN (HCC)    Chronic anticoagulation    RVF (right ventricular failure) ([facility])    Metabolic alkalosis        ASSESSMENT of Current Deficits Patient exhibits decreased strength, balance, and endurance impairing functional mobility, transfers, gait , gait distance, and tolerance to activity. Tremors in BUE. Mildly unsteady during gait; no loss of balance or buckling however patient became shaky. Increased gait this session patient BLE become weak and needing multiple seated rest breaks. Fatigues quickly. Patient is motivated and willing to work with therapy. Patient needs continued PT to improve strength and endurance to tolerate and complete functional mobility with decreased assist required.         PHYSICAL THERAPY  PLAN OF CARE       Physical therapy plan of care is established based on physician order,  patient diagnosis and clinical assessment    Current Treatment Recommendations:    -Bed Mobility: Lower extremity exercises  and Trunk control activities   -Sitting Balance: Incorporate reaching activities to activate trunk muscles , Hands on support to maintain midline , Facilitate active trunk muscle engagement , Facilitate postural control in all planes , and Engage in core activities to allow for movement within base of support   -Standing Balance: Perform strengthening exercises in standing to promote motor control with or without upper extremity support , Instruct patient on adequate base of support to maintain balance, and Challenge balance utilizing reaching  activities beyond center of gravity    -Transfers: Provide instruction on proper hand and foot position for adequate transfer of weight onto lower extremities and use of gait device if needed, Cues for hand placement, technique and safety. Provide stabilization to prevent fall , Facilitate weight shift forward on to lower extremities and provide necessary stabilization of bilateral lower extremities , Support transfer of weight on to lower extremities, and Assist with extension of knees trunk and hip to accept weight transfer   -Gait: Gait training, Standing activities to improve: base of support, weight shift, weight bearing , Exercises to improve trunk control, Exercises to improve hip and knee control, Performance of protected weight bearing activities, and Activities to increase weight bearing   -Endurance: Utilize Supervised activities to increase level of endurance to allow for safe functional mobility including transfers and gait  and Use graduated activities to promote good breathing techniques and provide support and education to maximize respiratory function    PT long term treatment goals are located in below grid    Patient and or family understand(s) diagnosis, prognosis, and plan of care.     Frequency of treatments: Patient will be seen  dailyyy         Prior Level of Function: Patient ambulated with wheeled walker   Rehab Potential: good - for baseline    Past medical history:   Past Medical History:   Diagnosis Date    A-fib ([facility] [address]     Acute on chronic congestive heart failure (HCC)     Anxiety     Asthma     CAD (coronary artery disease) 01/21/2016    Cancer ([facility] [address]  breast ca 2006    right lumpectomy    Chronic kidney disease     nephrolithiasis    Depression     Diabetes mellitus ([facility] [address]     H/O mammogram     Hx MRSA infection     toe infection january 2012    Hyperlipidemia     Hypertension     Lateral epicondylitis     SERENA on CPAP     Parkinson's disease ([facility] [address]     Tubal ligation status      Past Surgical History:   Procedure Laterality Date    BREAST LUMPECTOMY      BREAST REDUCTION SURGERY      CARDIAC CATHETERIZATION  4/28/2014    Dr. Malik Pop  01/11/2022    Dr. Ben Tam  7/29/15    CT GUIDED CHEST TUBE  7/8/2022    CT GUIDED CHEST TUBE 7/8/2022 Nabil Baldwin MD [facility]    ENDOSCOPY, COLON, DIAGNOSTIC  7/19/15    GALLBLADDER SURGERY      LUMBAR LAMINECTOMY      TOE AMPUTATION      TONSILLECTOMY      UPPER GASTROINTESTINAL ENDOSCOPY      UPPER GASTROINTESTINAL ENDOSCOPY N/A 7/19/2022    EGD ESOPHAGOGASTRODUODENOSCOPY performed by Emily Dallas MD at [address]:    Precautions: Up with assistance, falls, O2, and Afib, morbid obesity      Social history: Patient lives at [employer] owned: [facility], [address], and [address] Mobility Inpatient   How much difficulty turning over in bed?: A Little  How much difficulty sitting down on / standing up from a chair with arms?: A Little  How much difficulty moving from lying on back to sitting on side of bed?: A Little  How much help from another person moving to and from a bed to a chair?: A Little  How much help from another person needed to walk in hospital room?: A Lot  How much help from another person for climbing 3-5 steps with a railing?: A Lot  AM-PAC Inpatient Mobility Raw Score : 16  AM-PAC Inpatient T-Scale Score : 40.78  Mobility Inpatient CMS 0-100% Score: 54.16  Mobility Inpatient CMS G-Code Modifier : CK    Nursing cleared patient for PT treatment. OBJECTIVE;   Initial Evaluation  Date: 8/29/2022 Treatment Date:  9/2/2022     Short Term/ Long Term   Goals   Was pt agreeable to Eval/treatment? Yes Yes  To be met in 3 days   Pain level   0 /10   0/10    Bed Mobility    Rolling: Moderate assist of 1    Supine to sit: Moderate assist of 1    Sit to supine:  Moderate assist of 1    Scooting: Minimal assist of 1   Rolling: Supervision    Supine to sit: Supervision    Sit to supine: Not assessed patient seated edge of bed   Scooting: Supervision     Rolling: Independent    Supine to sit: Independent    Sit to supine: Independent    Scooting: Independent     Transfers Sit to stand: Minimal assist of 1  Sit to stand: Supervision x multiple reps    Sit to stand: Independent    Ambulation     3-5 feet using  wheeled walker with Minimal assist of 1   for walker control, walker approximation, balance, and safety 2x6 side steps, 2x6 forward/backward steps using  wheeled walker with Minimal assist of 1   for balance, upright, and safety       > 75 feet using  wheeled walker with Modified Independent    Stair negotiation: ascended and descended   Not assessed  not assessed        ROM Within functional limits    Increase range of motion 10% of affected joints    Strength BUE:  refer to OT eval  RLE:  4-/5  LLE:  4-/5  Increase strength in affected mm groups by 1/3 grade   Balance Sitting EOB:  fair +  Dynamic Standing:  fair  Sitting EOB: good   Dynamic Standing: fair with wheeled walker   Sitting EOB:  good   Dynamic Standing: fair +     Patient is Alert & Oriented x person, place, time, and situation and follows directions    Sensation:  Patient  denies numbness/tingling   Edema:  no   Endurance: fair      Vitals:  1.5 liters nasal cannula   Blood Pressure at rest  Blood Pressure during session    Heart Rate at rest Heart Rate during session   SPO2 at rest %  SPO2 during session %     Patient education  Patient educated on role of Physical Therapy, risks of immobility, safety and plan of care, energy conservation,  importance of mobility while in hospital , purse lip breathing, and safety      Patient response to education:   Pt verbalized understanding Pt demonstrated skill Pt requires further education in this area   Yes Partial Yes      Treatment:  Patient practiced and was instructed/facilitated in the following treatment:patient at edge of bed and sat for 10 minutes.  Performed multiple sit to stands and ambulated as in chart. Seated exercise. Therapeutic Exercises:  long arc quad and seated marching x 10 reps      At end of session, patient in chair with     call light and phone within reach,  all lines and tubes intact, nursing notified. Patient would benefit from continued skilled Physical Therapy to improve functional independence and quality of life.          Patient's/ family goals   rehab    Time in  1  Time out  235    Total Treatment Time  30 minutes    CPT codes:  Therapeutic activities (09510)   25 minutes  2 unit(s)  Therapeutic exercises (30569)   5 minutes  0 unit(s)    Ryan Johnson PTA [facility]#[id number]

## 2022-09-03 LAB
ANION GAP SERPL CALCULATED.3IONS-SCNC: 7 MMOL/L (ref 7–16)
BASOPHILS ABSOLUTE: 0.01 E9/L (ref 0–0.2)
BASOPHILS RELATIVE PERCENT: 0.1 % (ref 0–2)
BUN BLDV-MCNC: 33 MG/DL (ref 6–23)
CALCIUM SERPL-MCNC: 8.9 MG/DL (ref 8.6–10.2)
CHLORIDE BLD-SCNC: 92 MMOL/L (ref 98–107)
CO2: 44 MMOL/L (ref 22–29)
CREAT SERPL-MCNC: 1.1 MG/DL (ref 0.5–1)
EOSINOPHILS ABSOLUTE: 0 E9/L (ref 0.05–0.5)
EOSINOPHILS RELATIVE PERCENT: 0 % (ref 0–6)
GFR AFRICAN AMERICAN: 59
GFR NON-AFRICAN AMERICAN: 49 ML/MIN/1.73
GLUCOSE BLD-MCNC: 143 MG/DL (ref 74–99)
HCT VFR BLD CALC: 33.3 % (ref 34–48)
HEMOGLOBIN: 10.1 G/DL (ref 11.5–15.5)
IMMATURE GRANULOCYTES #: 0.07 E9/L
IMMATURE GRANULOCYTES %: 1 % (ref 0–5)
LYMPHOCYTES ABSOLUTE: 1.22 E9/L (ref 1.5–4)
LYMPHOCYTES RELATIVE PERCENT: 17.2 % (ref 20–42)
MAGNESIUM: 2 MG/DL (ref 1.6–2.6)
MCH RBC QN AUTO: 28.3 PG (ref 26–35)
MCHC RBC AUTO-ENTMCNC: 30.3 % (ref 32–34.5)
MCV RBC AUTO: 93.3 FL (ref 80–99.9)
METER GLUCOSE: 139 MG/DL (ref 74–99)
METER GLUCOSE: 228 MG/DL (ref 74–99)
METER GLUCOSE: 291 MG/DL (ref 74–99)
METER GLUCOSE: 333 MG/DL (ref 74–99)
MONOCYTES ABSOLUTE: 0.41 E9/L (ref 0.1–0.95)
MONOCYTES RELATIVE PERCENT: 5.8 % (ref 2–12)
NEUTROPHILS ABSOLUTE: 5.4 E9/L (ref 1.8–7.3)
NEUTROPHILS RELATIVE PERCENT: 75.9 % (ref 43–80)
PDW BLD-RTO: 15.2 FL (ref 11.5–15)
PLATELET # BLD: 170 E9/L (ref 130–450)
PMV BLD AUTO: 10.4 FL (ref 7–12)
POTASSIUM REFLEX MAGNESIUM: 3.5 MMOL/L (ref 3.5–5)
RBC # BLD: 3.57 E12/L (ref 3.5–5.5)
SODIUM BLD-SCNC: 143 MMOL/L (ref 132–146)
WBC # BLD: 7.1 E9/L (ref 4.5–11.5)

## 2022-09-03 PROCEDURE — 36415 COLL VENOUS BLD VENIPUNCTURE: CPT

## 2022-09-03 PROCEDURE — 6370000000 HC RX 637 (ALT 250 FOR IP): Performed by: FAMILY MEDICINE

## 2022-09-03 PROCEDURE — 6370000000 HC RX 637 (ALT 250 FOR IP): Performed by: INTERNAL MEDICINE

## 2022-09-03 PROCEDURE — 2700000000 HC OXYGEN THERAPY PER DAY

## 2022-09-03 PROCEDURE — 85025 COMPLETE CBC W/AUTO DIFF WBC: CPT

## 2022-09-03 PROCEDURE — 99232 SBSQ HOSP IP/OBS MODERATE 35: CPT | Performed by: INTERNAL MEDICINE

## 2022-09-03 PROCEDURE — 2060000000 HC ICU INTERMEDIATE R&B

## 2022-09-03 PROCEDURE — 83735 ASSAY OF MAGNESIUM: CPT

## 2022-09-03 PROCEDURE — 94640 AIRWAY INHALATION TREATMENT: CPT

## 2022-09-03 PROCEDURE — 80048 BASIC METABOLIC PNL TOTAL CA: CPT

## 2022-09-03 PROCEDURE — 6360000002 HC RX W HCPCS: Performed by: FAMILY MEDICINE

## 2022-09-03 PROCEDURE — 82962 GLUCOSE BLOOD TEST: CPT

## 2022-09-03 PROCEDURE — 99232 SBSQ HOSP IP/OBS MODERATE 35: CPT | Performed by: NURSE PRACTITIONER

## 2022-09-03 PROCEDURE — 6370000000 HC RX 637 (ALT 250 FOR IP): Performed by: STUDENT IN AN ORGANIZED HEALTH CARE EDUCATION/TRAINING PROGRAM

## 2022-09-03 PROCEDURE — 94660 CPAP INITIATION&MGMT: CPT

## 2022-09-03 PROCEDURE — 36592 COLLECT BLOOD FROM PICC: CPT

## 2022-09-03 PROCEDURE — 2580000003 HC RX 258: Performed by: FAMILY MEDICINE

## 2022-09-03 RX ADMIN — ROPINIROLE HYDROCHLORIDE 1 MG: 1 TABLET, FILM COATED ORAL at 15:20

## 2022-09-03 RX ADMIN — BUDESONIDE 500 MCG: 0.5 SUSPENSION RESPIRATORY (INHALATION) at 18:02

## 2022-09-03 RX ADMIN — DIGOXIN 125 MCG: 125 TABLET ORAL at 09:42

## 2022-09-03 RX ADMIN — IPRATROPIUM BROMIDE AND ALBUTEROL SULFATE 3 ML: .5; 2.5 SOLUTION RESPIRATORY (INHALATION) at 13:55

## 2022-09-03 RX ADMIN — PANTOPRAZOLE SODIUM 40 MG: 40 TABLET, DELAYED RELEASE ORAL at 09:42

## 2022-09-03 RX ADMIN — BUDESONIDE 500 MCG: 0.5 SUSPENSION RESPIRATORY (INHALATION) at 06:12

## 2022-09-03 RX ADMIN — Medication 10 ML: at 22:26

## 2022-09-03 RX ADMIN — SUCRALFATE 1 G: 1 TABLET ORAL at 22:11

## 2022-09-03 RX ADMIN — ROPINIROLE HYDROCHLORIDE 1 MG: 1 TABLET, FILM COATED ORAL at 22:11

## 2022-09-03 RX ADMIN — BUMETANIDE 1 MG: 1 TABLET ORAL at 09:41

## 2022-09-03 RX ADMIN — SUCRALFATE 1 G: 1 TABLET ORAL at 09:42

## 2022-09-03 RX ADMIN — INSULIN LISPRO 6 UNITS: 100 INJECTION, SOLUTION INTRAVENOUS; SUBCUTANEOUS at 17:24

## 2022-09-03 RX ADMIN — ANTI-FUNGAL POWDER MICONAZOLE NITRATE TALC FREE 1 EACH: 1.42 POWDER TOPICAL at 22:12

## 2022-09-03 RX ADMIN — INSULIN LISPRO 3 UNITS: 100 INJECTION, SOLUTION INTRAVENOUS; SUBCUTANEOUS at 11:04

## 2022-09-03 RX ADMIN — Medication 10 ML: at 09:40

## 2022-09-03 RX ADMIN — CARBIDOPA AND LEVODOPA 2 TABLET: 25; 100 TABLET, EXTENDED RELEASE ORAL at 22:11

## 2022-09-03 RX ADMIN — SUCRALFATE 1 G: 1 TABLET ORAL at 15:21

## 2022-09-03 RX ADMIN — APIXABAN 5 MG: 5 TABLET, FILM COATED ORAL at 22:11

## 2022-09-03 RX ADMIN — LORAZEPAM 0.5 MG: 0.5 TABLET ORAL at 17:21

## 2022-09-03 RX ADMIN — INSULIN LISPRO 2 UNITS: 100 INJECTION, SOLUTION INTRAVENOUS; SUBCUTANEOUS at 11:02

## 2022-09-03 RX ADMIN — INSULIN LISPRO 3 UNITS: 100 INJECTION, SOLUTION INTRAVENOUS; SUBCUTANEOUS at 17:24

## 2022-09-03 RX ADMIN — ACETAZOLAMIDE 250 MG: 250 TABLET ORAL at 09:42

## 2022-09-03 RX ADMIN — SUCRALFATE 1 G: 1 TABLET ORAL at 17:21

## 2022-09-03 RX ADMIN — IPRATROPIUM BROMIDE AND ALBUTEROL SULFATE 3 ML: .5; 2.5 SOLUTION RESPIRATORY (INHALATION) at 06:12

## 2022-09-03 RX ADMIN — HEPARIN 100 UNITS: 100 SYRINGE at 22:16

## 2022-09-03 RX ADMIN — ANTI-FUNGAL POWDER MICONAZOLE NITRATE TALC FREE 1 EACH: 1.42 POWDER TOPICAL at 09:40

## 2022-09-03 RX ADMIN — ROPINIROLE HYDROCHLORIDE 1 MG: 1 TABLET, FILM COATED ORAL at 09:42

## 2022-09-03 RX ADMIN — INSULIN GLARGINE 12 UNITS: 100 INJECTION, SOLUTION SUBCUTANEOUS at 09:44

## 2022-09-03 RX ADMIN — ASPIRIN 81 MG: 81 TABLET, COATED ORAL at 09:42

## 2022-09-03 RX ADMIN — INSULIN GLARGINE 12 UNITS: 100 INJECTION, SOLUTION SUBCUTANEOUS at 22:19

## 2022-09-03 RX ADMIN — MONTELUKAST 10 MG: 10 TABLET, FILM COATED ORAL at 22:11

## 2022-09-03 RX ADMIN — CARBIDOPA AND LEVODOPA 2 TABLET: 25; 100 TABLET, EXTENDED RELEASE ORAL at 09:41

## 2022-09-03 RX ADMIN — IPRATROPIUM BROMIDE AND ALBUTEROL SULFATE 3 ML: .5; 2.5 SOLUTION RESPIRATORY (INHALATION) at 18:01

## 2022-09-03 RX ADMIN — HEPARIN 100 UNITS: 100 SYRINGE at 09:40

## 2022-09-03 RX ADMIN — CARBIDOPA AND LEVODOPA 2 TABLET: 25; 100 TABLET, EXTENDED RELEASE ORAL at 15:20

## 2022-09-03 RX ADMIN — Medication 5 MG: at 22:11

## 2022-09-03 RX ADMIN — METOPROLOL SUCCINATE 75 MG: 50 TABLET, EXTENDED RELEASE ORAL at 09:41

## 2022-09-03 RX ADMIN — BUMETANIDE 1 MG: 1 TABLET ORAL at 22:11

## 2022-09-03 RX ADMIN — ATORVASTATIN CALCIUM 20 MG: 20 TABLET, FILM COATED ORAL at 22:11

## 2022-09-03 RX ADMIN — METOPROLOL SUCCINATE 75 MG: 50 TABLET, EXTENDED RELEASE ORAL at 22:10

## 2022-09-03 RX ADMIN — PREDNISONE 40 MG: 20 TABLET ORAL at 09:41

## 2022-09-03 RX ADMIN — INSULIN LISPRO 3 UNITS: 100 INJECTION, SOLUTION INTRAVENOUS; SUBCUTANEOUS at 09:48

## 2022-09-03 RX ADMIN — IPRATROPIUM BROMIDE AND ALBUTEROL SULFATE 3 ML: .5; 2.5 SOLUTION RESPIRATORY (INHALATION) at 09:58

## 2022-09-03 RX ADMIN — APIXABAN 5 MG: 5 TABLET, FILM COATED ORAL at 09:42

## 2022-09-03 NOTE — PLAN OF CARE
Problem: Discharge Planning  Goal: Discharge to home or other facility with appropriate resources  Outcome: Progressing     Problem: ABCDS Injury Assessment  Goal: Absence of physical injury  Outcome: Progressing     Problem: Safety - Adult  Goal: Free from fall injury  Outcome: Progressing  Flowsheets (Taken 9/3/2022 0132)  Free From Fall Injury: Instruct family/caregiver on patient safety     Problem: Skin/Tissue Integrity  Goal: Absence of new skin breakdown  Description: 1. Monitor for areas of redness and/or skin breakdown  2. Assess vascular access sites hourly  3. Every 4-6 hours minimum:  Change oxygen saturation probe site  4. Every 4-6 hours:  If on nasal continuous positive airway pressure, respiratory therapy assess nares and determine need for appliance change or resting period.   Outcome: Progressing     Problem: Chronic Conditions and Co-morbidities  Goal: Patient's chronic conditions and co-morbidity symptoms are monitored and maintained or improved  Outcome: Progressing     Problem: Pain  Goal: Verbalizes/displays adequate comfort level or baseline comfort level  Outcome: Progressing     Problem: Nutrition Deficit:  Goal: Optimize nutritional status  Outcome: Progressing

## 2022-09-03 NOTE — PROGRESS NOTES
[facility] Progress Note    Admitting Date and Time: 8/24/2022 11:55 AM  Admit Dx: Atrial fibrillation with rapid ventricular response ([facility] [address] [I48.91]    Subjective:  Patient is being followed for Atrial fibrillation with rapid ventricular response ([facility] [address] [I48.91]     Seen and examined. Reporting 3-4 episodes of loose stool since yesterday. Was room air at baseline, now on 3 liters NC. Reports history of SERENA but does not have CPAP machine. Wore bipap over night, does not want to have to wear it during the day. States she gets dizzy upon standing initially from a lying or sitting position. Per RN: nothing to report    ROS: denies fever, chills, cp, sob, n/v, HA unless stated above.       insulin glargine  12 Units SubCUTAneous BID    bumetanide  1 mg Oral BID    metoprolol succinate  75 mg Oral BID    acetaZOLAMIDE  250 mg Oral Daily    predniSONE  40 mg Oral Daily    digoxin  125 mcg Oral Daily    sodium chloride flush  5-40 mL IntraVENous 2 times per day    heparin flush  1 mL IntraVENous 2 times per day    benzoin compound   Topical Nightly    insulin lispro  0-8 Units SubCUTAneous TID WC    insulin lispro  0-4 Units SubCUTAneous Nightly    insulin lispro  3 Units SubCUTAneous TID WC    aspirin EC  81 mg Oral Daily    apixaban  5 mg Oral BID    rOPINIRole  1 mg Oral TID    sucralfate  1 g Oral 4x Daily    pantoprazole  40 mg Oral QAM    montelukast  10 mg Oral Nightly    miconazole  1 each Topical BID    ipratropium-albuterol  1 vial Inhalation 4x Daily    carbidopa-levodopa  2 tablet Oral TID    budesonide  0.5 mg Nebulization BID    atorvastatin  20 mg Oral Nightly    sodium chloride flush  5-40 mL IntraVENous 2 times per day    melatonin  5 mg Oral Nightly     benzonatate, 100 mg, TID PRN  sodium chloride flush, 5-40 mL, PRN  sodium chloride, , PRN  heparin flush, 1 mL, PRN  mirtazapine, 15 mg, QHS PRN  LORazepam, 0.5 mg, Q12H PRN  glucose, 4 tablet, PRN  dextrose bolus, 125 mL, PRN Or  dextrose bolus, 250 mL, PRN  glucagon (rDNA), 1 mg, PRN  dextrose, , Continuous PRN  polyethylene glycol, 17 g, Daily PRN  acetaminophen, 650 mg, Q6H PRN   Or  acetaminophen, 650 mg, Q6H PRN       Objective:    /68   Pulse 94   Temp 98 °F (36.7 °C)   Resp 18   Ht 5' (1.524 m)   Wt 213 lb 1 oz (96.6 kg)   SpO2 98%   BMI 41.61 kg/m²     General Appearance: alert and oriented to person, place and time and in no acute distress, morbidly obese  Skin: warm and dry  Head: normocephalic and atraumatic  Eyes: pupils equal, round, and reactive to light, extraocular eye movements intact, conjunctivae normal  Neck: neck supple and non tender without mass   Pulmonary/Chest: clear to auscultation bilaterally- no wheezes, rales or rhonchi, normal air movement, no respiratory distress   Cardiovascular: normal rate, irregular rhythm,  normal S1 and S2 and no carotid bruits  Abdomen: soft, non-tender, non-distended, normal bowel sounds, no masses or organomegaly  Extremities: no cyanosis, no clubbing and trace pitting edema b/l LE  Neurologic: no cranial nerve deficit and speech normal        Recent Labs     09/01/22  0505 09/02/22  0522 09/03/22  0622    141 143   K 3.7 3.5 3.5   CL 90* 91* 92*   CO2 46* 43* 44*   BUN 26* 32* 33*   CREATININE 1.0 1.1* 1.1*   GLUCOSE 169* 241* 143*   CALCIUM 8.8 9.0 8.9         Recent Labs     09/03/22  0622   WBC 7.1   RBC 3.57   HGB 10.1*   HCT 33.3*   MCV 93.3   MCH 28.3   MCHC 30.3*   RDW 15.2*      MPV 10.4       Radiology:   XR CHEST PORTABLE    Result Date: 8/29/2022  EXAMINATION: ONE XRAY VIEW OF THE CHEST 8/29/2022 6:34 am COMPARISON: 08/26/2022 HISTORY: ORDERING SYSTEM PROVIDED HISTORY: SOB TECHNOLOGIST PROVIDED HISTORY: Reason for exam:->SOB FINDINGS: Heart is upper limits of normal in size. There is reaccumulation of a small right pleural effusion which appears partially loculated in the major fissure. No pneumothorax. No new alveolar consolidation. Osseous structures are stable. Unchanged postop change in the right chest wall soft tissues. 1.  Small right pleural effusion. 2.  No other significant interval change. Assessment:    Principal Problem:    Acute respiratory failure with hypoxia and hypercapnia ([facility])  Active Problems:    Paroxysmal atrial fibrillation (HCC)    Acute decompensated heart failure (HCC)    Encephalopathy    Pleural effusion, right    Acute confusion    Chronic diastolic (congestive) heart failure (HCC)    Macrocytosis    Other specified anemias    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[facility] [address]    Puerperal sepsis with acute hypoxic respiratory failure without septic shock (HCC)    Pulmonary HTN (HCC)    Chronic anticoagulation    RVF (right ventricular failure) (HCC)    Metabolic alkalosis    Diabetes mellitus (HCC)    Atrial fibrillation with RVR (HCC)    Coronary artery disease involving native coronary artery of native heart without angina pectoris  Resolved Problems:    * No resolved hospital problems. *    Plan:  1. Acute on chronic  Respiratory failure with hypoxia and hypercapnia - -continue respiratory toilet and medications.    -Currently on 2L. -Repeat CXR 09/02 showed stable effusion    2. Right pleural effusion  - decreased on previous xrays, 09/02 CXR stable. 3.  SHANTANU,  - currently resolved - avoid nephrotoxins. -Trend labs and treat accordingly. 4.  Atrial fibrillation   - cardiology following  -Increased  BB dose to 75 mg BID on 8/31/22. Continue Telemetry monitoring- currently showing afib. Dig level 0.6    5. T2DM   - blood sugars labile  -continue Diabetic diet, Lantus, 3 units of Humalog with meals and MDSS insulin scale, hypoglycemia protocol. ,   - 8/24/22, A1C 6.1%,  -Lantus increased from 10 units BID to 12 units BID. 6.   metabolic Encephalopathy  - currently resolved. 7.  Metabolic alkalosis   - bicarb improving, now 44   -AG 7.   Unsure of cause, possible contraction alkalosis, chloride is also depleted. Also possible compensatory for chronic hypercapnic respiratory failure. Patient refused ABG. -Started diamox 8/31, today bicarb 44, anion gap 7   - continue to monitor. Total care time:  25 minutes    Patient is able to go back to SNF she came from, precert needs to be started but not accepted prior to going there (patient is a bed hold).          Electronically signed by RASHARD Roy CNP on 9/3/2022 at 9:14 AM

## 2022-09-03 NOTE — PLAN OF CARE
Problem: Discharge Planning  Goal: Discharge to home or other facility with appropriate resources  9/3/2022 1055 by Amanda Vegas RN  Outcome: Progressing  9/3/2022 0133 by Larry Lowe RN  Outcome: Progressing     Problem: ABCDS Injury Assessment  Goal: Absence of physical injury  9/3/2022 1055 by Amanda Vegas RN  Outcome: Progressing  9/3/2022 0133 by Larry Lowe RN  Outcome: Progressing     Problem: Safety - Adult  Goal: Free from fall injury  9/3/2022 1055 by Amanda Vegas RN  Outcome: Progressing  9/3/2022 0133 by Larry Lowe RN  Outcome: Progressing  Flowsheets (Taken 9/3/2022 0132)  Free From Fall Injury: Instruct family/caregiver on patient safety     Problem: Skin/Tissue Integrity  Goal: Absence of new skin breakdown  Description: 1. Monitor for areas of redness and/or skin breakdown  2. Assess vascular access sites hourly  3. Every 4-6 hours minimum:  Change oxygen saturation probe site  4. Every 4-6 hours:  If on nasal continuous positive airway pressure, respiratory therapy assess nares and determine need for appliance change or resting period.   9/3/2022 1055 by Amanda Vegas RN  Outcome: Progressing  9/3/2022 0133 by Larry Lowe RN  Outcome: Progressing     Problem: Chronic Conditions and Co-morbidities  Goal: Patient's chronic conditions and co-morbidity symptoms are monitored and maintained or improved  9/3/2022 1055 by Amanda Vegas RN  Outcome: Progressing  9/3/2022 0133 by Larry Lowe RN  Outcome: Progressing     Problem: Pain  Goal: Verbalizes/displays adequate comfort level or baseline comfort level  9/3/2022 1055 by Amanda Vegas RN  Outcome: Progressing  9/3/2022 0133 by Larry Lowe RN  Outcome: Progressing     Problem: Nutrition Deficit:  Goal: Optimize nutritional status  9/3/2022 1055 by Amanda Vegas RN  Outcome: Progressing  9/3/2022 0133 by Larry Lowe RN  Outcome: Progressing

## 2022-09-04 LAB
ANION GAP SERPL CALCULATED.3IONS-SCNC: 9 MMOL/L (ref 7–16)
BUN BLDV-MCNC: 30 MG/DL (ref 6–23)
CALCIUM SERPL-MCNC: 9 MG/DL (ref 8.6–10.2)
CHLORIDE BLD-SCNC: 92 MMOL/L (ref 98–107)
CO2: 39 MMOL/L (ref 22–29)
CREAT SERPL-MCNC: 1.1 MG/DL (ref 0.5–1)
GFR AFRICAN AMERICAN: 59
GFR NON-AFRICAN AMERICAN: 49 ML/MIN/1.73
GLUCOSE BLD-MCNC: 215 MG/DL (ref 74–99)
METER GLUCOSE: 148 MG/DL (ref 74–99)
METER GLUCOSE: 279 MG/DL (ref 74–99)
METER GLUCOSE: 324 MG/DL (ref 74–99)
METER GLUCOSE: 333 MG/DL (ref 74–99)
METER GLUCOSE: 356 MG/DL (ref 74–99)
POTASSIUM SERPL-SCNC: 3.2 MMOL/L (ref 3.5–5)
SODIUM BLD-SCNC: 140 MMOL/L (ref 132–146)

## 2022-09-04 PROCEDURE — 2060000000 HC ICU INTERMEDIATE R&B

## 2022-09-04 PROCEDURE — 6370000000 HC RX 637 (ALT 250 FOR IP): Performed by: INTERNAL MEDICINE

## 2022-09-04 PROCEDURE — 80048 BASIC METABOLIC PNL TOTAL CA: CPT

## 2022-09-04 PROCEDURE — 6360000002 HC RX W HCPCS: Performed by: FAMILY MEDICINE

## 2022-09-04 PROCEDURE — 2580000003 HC RX 258: Performed by: FAMILY MEDICINE

## 2022-09-04 PROCEDURE — 6370000000 HC RX 637 (ALT 250 FOR IP): Performed by: FAMILY MEDICINE

## 2022-09-04 PROCEDURE — 94660 CPAP INITIATION&MGMT: CPT

## 2022-09-04 PROCEDURE — 99232 SBSQ HOSP IP/OBS MODERATE 35: CPT | Performed by: INTERNAL MEDICINE

## 2022-09-04 PROCEDURE — 94640 AIRWAY INHALATION TREATMENT: CPT

## 2022-09-04 PROCEDURE — 99232 SBSQ HOSP IP/OBS MODERATE 35: CPT | Performed by: NURSE PRACTITIONER

## 2022-09-04 PROCEDURE — 36415 COLL VENOUS BLD VENIPUNCTURE: CPT

## 2022-09-04 PROCEDURE — 6370000000 HC RX 637 (ALT 250 FOR IP): Performed by: STUDENT IN AN ORGANIZED HEALTH CARE EDUCATION/TRAINING PROGRAM

## 2022-09-04 PROCEDURE — 82962 GLUCOSE BLOOD TEST: CPT

## 2022-09-04 PROCEDURE — 2700000000 HC OXYGEN THERAPY PER DAY

## 2022-09-04 RX ADMIN — INSULIN LISPRO 6 UNITS: 100 INJECTION, SOLUTION INTRAVENOUS; SUBCUTANEOUS at 11:41

## 2022-09-04 RX ADMIN — INSULIN LISPRO 4 UNITS: 100 INJECTION, SOLUTION INTRAVENOUS; SUBCUTANEOUS at 21:01

## 2022-09-04 RX ADMIN — SUCRALFATE 1 G: 1 TABLET ORAL at 17:28

## 2022-09-04 RX ADMIN — ATORVASTATIN CALCIUM 20 MG: 20 TABLET, FILM COATED ORAL at 20:56

## 2022-09-04 RX ADMIN — BUDESONIDE 500 MCG: 0.5 SUSPENSION RESPIRATORY (INHALATION) at 06:05

## 2022-09-04 RX ADMIN — IPRATROPIUM BROMIDE AND ALBUTEROL SULFATE 3 ML: .5; 2.5 SOLUTION RESPIRATORY (INHALATION) at 14:02

## 2022-09-04 RX ADMIN — ANTI-FUNGAL POWDER MICONAZOLE NITRATE TALC FREE 1 EACH: 1.42 POWDER TOPICAL at 08:31

## 2022-09-04 RX ADMIN — ANTI-FUNGAL POWDER MICONAZOLE NITRATE TALC FREE 1 EACH: 1.42 POWDER TOPICAL at 21:00

## 2022-09-04 RX ADMIN — BUDESONIDE 500 MCG: 0.5 SUSPENSION RESPIRATORY (INHALATION) at 18:15

## 2022-09-04 RX ADMIN — CARBIDOPA AND LEVODOPA 2 TABLET: 25; 100 TABLET, EXTENDED RELEASE ORAL at 20:56

## 2022-09-04 RX ADMIN — SUCRALFATE 1 G: 1 TABLET ORAL at 11:41

## 2022-09-04 RX ADMIN — IPRATROPIUM BROMIDE AND ALBUTEROL SULFATE 3 ML: .5; 2.5 SOLUTION RESPIRATORY (INHALATION) at 09:55

## 2022-09-04 RX ADMIN — METOPROLOL SUCCINATE 75 MG: 50 TABLET, EXTENDED RELEASE ORAL at 08:33

## 2022-09-04 RX ADMIN — CARBIDOPA AND LEVODOPA 2 TABLET: 25; 100 TABLET, EXTENDED RELEASE ORAL at 08:32

## 2022-09-04 RX ADMIN — PANTOPRAZOLE SODIUM 40 MG: 40 TABLET, DELAYED RELEASE ORAL at 08:33

## 2022-09-04 RX ADMIN — SUCRALFATE 1 G: 1 TABLET ORAL at 08:33

## 2022-09-04 RX ADMIN — APIXABAN 5 MG: 5 TABLET, FILM COATED ORAL at 08:33

## 2022-09-04 RX ADMIN — INSULIN GLARGINE 12 UNITS: 100 INJECTION, SOLUTION SUBCUTANEOUS at 08:35

## 2022-09-04 RX ADMIN — INSULIN LISPRO 6 UNITS: 100 INJECTION, SOLUTION INTRAVENOUS; SUBCUTANEOUS at 17:28

## 2022-09-04 RX ADMIN — INSULIN GLARGINE 12 UNITS: 100 INJECTION, SOLUTION SUBCUTANEOUS at 21:01

## 2022-09-04 RX ADMIN — ROPINIROLE HYDROCHLORIDE 1 MG: 1 TABLET, FILM COATED ORAL at 20:56

## 2022-09-04 RX ADMIN — DIGOXIN 125 MCG: 125 TABLET ORAL at 08:32

## 2022-09-04 RX ADMIN — IPRATROPIUM BROMIDE AND ALBUTEROL SULFATE 3 ML: .5; 2.5 SOLUTION RESPIRATORY (INHALATION) at 18:15

## 2022-09-04 RX ADMIN — ASPIRIN 81 MG: 81 TABLET, COATED ORAL at 08:32

## 2022-09-04 RX ADMIN — CARBIDOPA AND LEVODOPA 2 TABLET: 25; 100 TABLET, EXTENDED RELEASE ORAL at 15:00

## 2022-09-04 RX ADMIN — ROPINIROLE HYDROCHLORIDE 1 MG: 1 TABLET, FILM COATED ORAL at 08:33

## 2022-09-04 RX ADMIN — BENZOIN RESIN: 1000 LIQUID TOPICAL at 21:00

## 2022-09-04 RX ADMIN — INSULIN LISPRO 3 UNITS: 100 INJECTION, SOLUTION INTRAVENOUS; SUBCUTANEOUS at 08:35

## 2022-09-04 RX ADMIN — INSULIN LISPRO 3 UNITS: 100 INJECTION, SOLUTION INTRAVENOUS; SUBCUTANEOUS at 17:30

## 2022-09-04 RX ADMIN — Medication 10 ML: at 21:11

## 2022-09-04 RX ADMIN — MONTELUKAST 10 MG: 10 TABLET, FILM COATED ORAL at 20:55

## 2022-09-04 RX ADMIN — BUMETANIDE 1 MG: 1 TABLET ORAL at 08:32

## 2022-09-04 RX ADMIN — IPRATROPIUM BROMIDE AND ALBUTEROL SULFATE 3 ML: .5; 2.5 SOLUTION RESPIRATORY (INHALATION) at 06:05

## 2022-09-04 RX ADMIN — HEPARIN 100 UNITS: 100 SYRINGE at 21:10

## 2022-09-04 RX ADMIN — Medication 10 ML: at 08:34

## 2022-09-04 RX ADMIN — Medication 5 MG: at 20:55

## 2022-09-04 RX ADMIN — APIXABAN 5 MG: 5 TABLET, FILM COATED ORAL at 20:56

## 2022-09-04 RX ADMIN — PREDNISONE 40 MG: 20 TABLET ORAL at 08:32

## 2022-09-04 RX ADMIN — ROPINIROLE HYDROCHLORIDE 1 MG: 1 TABLET, FILM COATED ORAL at 15:02

## 2022-09-04 RX ADMIN — BUMETANIDE 1 MG: 1 TABLET ORAL at 20:56

## 2022-09-04 RX ADMIN — HEPARIN 100 UNITS: 100 SYRINGE at 08:34

## 2022-09-04 RX ADMIN — Medication 10 ML: at 21:10

## 2022-09-04 RX ADMIN — ACETAZOLAMIDE 250 MG: 250 TABLET ORAL at 08:33

## 2022-09-04 RX ADMIN — METOPROLOL SUCCINATE 75 MG: 50 TABLET, EXTENDED RELEASE ORAL at 20:55

## 2022-09-04 RX ADMIN — SUCRALFATE 1 G: 1 TABLET ORAL at 20:56

## 2022-09-04 RX ADMIN — INSULIN LISPRO 3 UNITS: 100 INJECTION, SOLUTION INTRAVENOUS; SUBCUTANEOUS at 11:43

## 2022-09-04 NOTE — PROGRESS NOTES
[facility] Progress Note    Admitting Date and Time: 8/24/2022 11:55 AM  Admit Dx: Atrial fibrillation with rapid ventricular response ([facility] [address] [I48.91]    Subjective:  Patient is being followed for Atrial fibrillation with rapid ventricular response ([facility] [address] [I48.91]     Seen and examined. Sitting up in chair. NC at 1 liter. Will remove and trial on room air. Patient asking for potato chips. Is aware of her diabetic diet restrictions. States has not had any more loose stools. Per RN: nothing to report    ROS: denies fever, chills, cp, sob, n/v, HA unless stated above.       insulin glargine  12 Units SubCUTAneous BID    bumetanide  1 mg Oral BID    metoprolol succinate  75 mg Oral BID    acetaZOLAMIDE  250 mg Oral Daily    predniSONE  40 mg Oral Daily    digoxin  125 mcg Oral Daily    sodium chloride flush  5-40 mL IntraVENous 2 times per day    heparin flush  1 mL IntraVENous 2 times per day    benzoin compound   Topical Nightly    insulin lispro  0-8 Units SubCUTAneous TID WC    insulin lispro  0-4 Units SubCUTAneous Nightly    insulin lispro  3 Units SubCUTAneous TID WC    aspirin EC  81 mg Oral Daily    apixaban  5 mg Oral BID    rOPINIRole  1 mg Oral TID    sucralfate  1 g Oral 4x Daily    pantoprazole  40 mg Oral QAM    montelukast  10 mg Oral Nightly    miconazole  1 each Topical BID    ipratropium-albuterol  1 vial Inhalation 4x Daily    carbidopa-levodopa  2 tablet Oral TID    budesonide  0.5 mg Nebulization BID    atorvastatin  20 mg Oral Nightly    sodium chloride flush  5-40 mL IntraVENous 2 times per day    melatonin  5 mg Oral Nightly     benzonatate, 100 mg, TID PRN  sodium chloride flush, 5-40 mL, PRN  sodium chloride, , PRN  heparin flush, 1 mL, PRN  mirtazapine, 15 mg, QHS PRN  LORazepam, 0.5 mg, Q12H PRN  glucose, 4 tablet, PRN  dextrose bolus, 125 mL, PRN   Or  dextrose bolus, 250 mL, PRN  glucagon (rDNA), 1 mg, PRN  dextrose, , Continuous PRN  polyethylene glycol, 17 g, Daily PRN  acetaminophen, 650 mg, Q6H PRN   Or  acetaminophen, 650 mg, Q6H PRN       Objective:    /69   Pulse 76   Temp 98.3 °F (36.8 °C)   Resp 16   Ht 5' (1.524 m)   Wt 207 lb 3 oz (94 kg)   SpO2 100%   BMI 40.46 kg/m²     General Appearance: alert and oriented to person, place and time and in no acute distress, morbidly obese  Skin: warm and dry  Head: normocephalic and atraumatic  Eyes: pupils equal, round, and reactive to light, extraocular eye movements intact, conjunctivae normal  Neck: neck supple and non tender without mass   Pulmonary/Chest: clear to auscultation bilaterally- no wheezes, rales or rhonchi, normal air movement, no respiratory distress   Cardiovascular: normal rate, irregular rhythm,  normal S1 and S2 and no carotid bruits  Abdomen: soft, non-tender, non-distended, normal bowel sounds, no masses or organomegaly  Extremities: no cyanosis, no clubbing and trace pitting edema b/l LE  Neurologic: no cranial nerve deficit and speech normal        Recent Labs     09/02/22  0522 09/03/22  0622    143   K 3.5 3.5   CL 91* 92*   CO2 43* 44*   BUN 32* 33*   CREATININE 1.1* 1.1*   GLUCOSE 241* 143*   CALCIUM 9.0 8.9       Recent Labs     09/03/22  0622   WBC 7.1   RBC 3.57   HGB 10.1*   HCT 33.3*   MCV 93.3   MCH 28.3   MCHC 30.3*   RDW 15.2*      MPV 10.4       Radiology:   XR CHEST PORTABLE    Result Date: 8/29/2022  EXAMINATION: ONE XRAY VIEW OF THE CHEST 8/29/2022 6:34 am COMPARISON: 08/26/2022 HISTORY: ORDERING SYSTEM PROVIDED HISTORY: SOB TECHNOLOGIST PROVIDED HISTORY: Reason for exam:->SOB FINDINGS: Heart is upper limits of normal in size. There is reaccumulation of a small right pleural effusion which appears partially loculated in the major fissure. No pneumothorax. No new alveolar consolidation. Osseous structures are stable. Unchanged postop change in the right chest wall soft tissues. 1.  Small right pleural effusion.  2.  No other significant interval change. Assessment:    Principal Problem:    Acute respiratory failure with hypoxia and hypercapnia ([facility])  Active Problems:    Paroxysmal atrial fibrillation (HCC)    Acute decompensated heart failure (HCC)    Encephalopathy    Pleural effusion, right    Acute confusion    Chronic diastolic (congestive) heart failure (HCC)    Macrocytosis    Other specified anemias    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[facility] [address]    Puerperal sepsis with acute hypoxic respiratory failure without septic shock (HCC)    Pulmonary HTN (HCC)    Chronic anticoagulation    RVF (right ventricular failure) (HCC)    Metabolic alkalosis    Diabetes mellitus (HCC)    Atrial fibrillation with RVR ([facility])    Coronary artery disease involving native coronary artery of native heart without angina pectoris  Resolved Problems:    * No resolved hospital problems. *    Plan:  1. Acute on chronic  Respiratory failure with hypoxia and hypercapnia - -continue respiratory toilet and medications.    -Currently on 2L- wean to keep O2 sats >92%  -Repeat CXR 09/02 showed stable effusion    2. Right pleural effusion  - decreased on previous xrays, 09/02 CXR stable. 3.  SHANTANU,  - currently resolved - avoid nephrotoxins. -Trend labs and treat accordingly. 4.  Atrial fibrillation   - cardiology following  -Increased  BB dose to 75 mg BID on 8/31/22. Continue Telemetry monitoring- currently showing afib. Dig level 0.6    5. T2DM   - blood sugars labile  -continue Diabetic diet, Lantus, 3 units of Humalog with meals and MDSS insulin scale, hypoglycemia protocol. ,   - A1C 6.1%,  -Lantus increased from 10 units BID to 12 units BID. 6.   metabolic Encephalopathy  - currently resolved. 7.  Metabolic alkalosis   - bicarb improving, now 44   -AG 7. Unsure of cause, possible contraction alkalosis, chloride is also depleted. Also possible compensatory for chronic hypercapnic respiratory failure. Patient refused ABG.     -Started diamox 8/31, today bicarb 44, anion gap 7   - continue to monitor. Total care time:  25 minutes    Disposition: Patient is able to go back to SNF she came from, precert needs to be started but not accepted prior to going there (patient is a bed hold).          Electronically signed by RASHARD Gomez CNP on 9/4/2022 at 9:30 AM

## 2022-09-05 ENCOUNTER — APPOINTMENT (OUTPATIENT)
Dept: GENERAL RADIOLOGY | Age: 72
DRG: 189 | End: 2022-09-05
Payer: MEDICARE

## 2022-09-05 LAB
ANION GAP SERPL CALCULATED.3IONS-SCNC: 8 MMOL/L (ref 7–16)
BASOPHILIC STIPPLING: ABNORMAL
BASOPHILS ABSOLUTE: 0 E9/L (ref 0–0.2)
BASOPHILS RELATIVE PERCENT: 0.1 % (ref 0–2)
BUN BLDV-MCNC: 40 MG/DL (ref 6–23)
CALCIUM SERPL-MCNC: 8.8 MG/DL (ref 8.6–10.2)
CHLORIDE BLD-SCNC: 91 MMOL/L (ref 98–107)
CO2: 37 MMOL/L (ref 22–29)
CREAT SERPL-MCNC: 1.2 MG/DL (ref 0.5–1)
EOSINOPHILS ABSOLUTE: 0 E9/L (ref 0.05–0.5)
EOSINOPHILS RELATIVE PERCENT: 0.1 % (ref 0–6)
GFR AFRICAN AMERICAN: 53
GFR NON-AFRICAN AMERICAN: 44 ML/MIN/1.73
GLUCOSE BLD-MCNC: 234 MG/DL (ref 74–99)
HCT VFR BLD CALC: 36 % (ref 34–48)
HEMOGLOBIN: 10.9 G/DL (ref 11.5–15.5)
LYMPHOCYTES ABSOLUTE: 0.53 E9/L (ref 1.5–4)
LYMPHOCYTES RELATIVE PERCENT: 5.3 % (ref 20–42)
MCH RBC QN AUTO: 27.5 PG (ref 26–35)
MCHC RBC AUTO-ENTMCNC: 30.3 % (ref 32–34.5)
MCV RBC AUTO: 90.9 FL (ref 80–99.9)
METER GLUCOSE: 192 MG/DL (ref 74–99)
METER GLUCOSE: 204 MG/DL (ref 74–99)
METER GLUCOSE: 220 MG/DL (ref 74–99)
METER GLUCOSE: 260 MG/DL (ref 74–99)
MONOCYTES ABSOLUTE: 0.21 E9/L (ref 0.1–0.95)
MONOCYTES RELATIVE PERCENT: 1.8 % (ref 2–12)
NEUTROPHILS ABSOLUTE: 9.86 E9/L (ref 1.8–7.3)
NEUTROPHILS RELATIVE PERCENT: 93 % (ref 43–80)
PDW BLD-RTO: 15.3 FL (ref 11.5–15)
PLATELET # BLD: 209 E9/L (ref 130–450)
PMV BLD AUTO: 10.4 FL (ref 7–12)
POIKILOCYTES: ABNORMAL
POTASSIUM SERPL-SCNC: 4.4 MMOL/L (ref 3.5–5)
RBC # BLD: 3.96 E12/L (ref 3.5–5.5)
SODIUM BLD-SCNC: 136 MMOL/L (ref 132–146)
TEAR DROP CELLS: ABNORMAL
WBC # BLD: 10.6 E9/L (ref 4.5–11.5)

## 2022-09-05 PROCEDURE — APPSS30 APP SPLIT SHARED TIME 16-30 MINUTES: Performed by: INTERNAL MEDICINE

## 2022-09-05 PROCEDURE — 94640 AIRWAY INHALATION TREATMENT: CPT

## 2022-09-05 PROCEDURE — 99233 SBSQ HOSP IP/OBS HIGH 50: CPT | Performed by: INTERNAL MEDICINE

## 2022-09-05 PROCEDURE — 36415 COLL VENOUS BLD VENIPUNCTURE: CPT

## 2022-09-05 PROCEDURE — 6370000000 HC RX 637 (ALT 250 FOR IP): Performed by: INTERNAL MEDICINE

## 2022-09-05 PROCEDURE — 6360000002 HC RX W HCPCS: Performed by: FAMILY MEDICINE

## 2022-09-05 PROCEDURE — 85025 COMPLETE CBC W/AUTO DIFF WBC: CPT

## 2022-09-05 PROCEDURE — 71045 X-RAY EXAM CHEST 1 VIEW: CPT

## 2022-09-05 PROCEDURE — 82962 GLUCOSE BLOOD TEST: CPT

## 2022-09-05 PROCEDURE — 2700000000 HC OXYGEN THERAPY PER DAY

## 2022-09-05 PROCEDURE — 2580000003 HC RX 258: Performed by: FAMILY MEDICINE

## 2022-09-05 PROCEDURE — 2060000000 HC ICU INTERMEDIATE R&B

## 2022-09-05 PROCEDURE — 80048 BASIC METABOLIC PNL TOTAL CA: CPT

## 2022-09-05 PROCEDURE — 6370000000 HC RX 637 (ALT 250 FOR IP): Performed by: FAMILY MEDICINE

## 2022-09-05 PROCEDURE — 6360000002 HC RX W HCPCS: Performed by: INTERNAL MEDICINE

## 2022-09-05 PROCEDURE — 94660 CPAP INITIATION&MGMT: CPT

## 2022-09-05 PROCEDURE — 6370000000 HC RX 637 (ALT 250 FOR IP): Performed by: STUDENT IN AN ORGANIZED HEALTH CARE EDUCATION/TRAINING PROGRAM

## 2022-09-05 RX ORDER — DOCUSATE SODIUM 100 MG/1
100 CAPSULE, LIQUID FILLED ORAL 2 TIMES DAILY PRN
Status: DISCONTINUED | OUTPATIENT
Start: 2022-09-05 | End: 2022-09-08 | Stop reason: HOSPADM

## 2022-09-05 RX ORDER — ONDANSETRON 2 MG/ML
4 INJECTION INTRAMUSCULAR; INTRAVENOUS EVERY 6 HOURS PRN
Status: DISCONTINUED | OUTPATIENT
Start: 2022-09-05 | End: 2022-09-08 | Stop reason: HOSPADM

## 2022-09-05 RX ADMIN — SUCRALFATE 1 G: 1 TABLET ORAL at 16:27

## 2022-09-05 RX ADMIN — Medication 5 MG: at 21:06

## 2022-09-05 RX ADMIN — PANTOPRAZOLE SODIUM 40 MG: 40 TABLET, DELAYED RELEASE ORAL at 09:02

## 2022-09-05 RX ADMIN — ROPINIROLE HYDROCHLORIDE 1 MG: 1 TABLET, FILM COATED ORAL at 13:42

## 2022-09-05 RX ADMIN — SUCRALFATE 1 G: 1 TABLET ORAL at 08:16

## 2022-09-05 RX ADMIN — ROPINIROLE HYDROCHLORIDE 1 MG: 1 TABLET, FILM COATED ORAL at 08:16

## 2022-09-05 RX ADMIN — INSULIN LISPRO 3 UNITS: 100 INJECTION, SOLUTION INTRAVENOUS; SUBCUTANEOUS at 16:32

## 2022-09-05 RX ADMIN — APIXABAN 5 MG: 5 TABLET, FILM COATED ORAL at 08:17

## 2022-09-05 RX ADMIN — BUMETANIDE 1 MG: 1 TABLET ORAL at 21:06

## 2022-09-05 RX ADMIN — HEPARIN 100 UNITS: 100 SYRINGE at 21:12

## 2022-09-05 RX ADMIN — ANTI-FUNGAL POWDER MICONAZOLE NITRATE TALC FREE 1 EACH: 1.42 POWDER TOPICAL at 08:17

## 2022-09-05 RX ADMIN — INSULIN LISPRO 4 UNITS: 100 INJECTION, SOLUTION INTRAVENOUS; SUBCUTANEOUS at 16:28

## 2022-09-05 RX ADMIN — PREDNISONE 40 MG: 20 TABLET ORAL at 08:16

## 2022-09-05 RX ADMIN — ONDANSETRON 4 MG: 2 INJECTION INTRAMUSCULAR; INTRAVENOUS at 21:11

## 2022-09-05 RX ADMIN — INSULIN LISPRO 2 UNITS: 100 INJECTION, SOLUTION INTRAVENOUS; SUBCUTANEOUS at 12:04

## 2022-09-05 RX ADMIN — METOPROLOL SUCCINATE 75 MG: 50 TABLET, EXTENDED RELEASE ORAL at 21:05

## 2022-09-05 RX ADMIN — ANTI-FUNGAL POWDER MICONAZOLE NITRATE TALC FREE 1 EACH: 1.42 POWDER TOPICAL at 21:11

## 2022-09-05 RX ADMIN — IPRATROPIUM BROMIDE AND ALBUTEROL SULFATE 3 ML: .5; 2.5 SOLUTION RESPIRATORY (INHALATION) at 09:34

## 2022-09-05 RX ADMIN — CARBIDOPA AND LEVODOPA 2 TABLET: 25; 100 TABLET, EXTENDED RELEASE ORAL at 13:42

## 2022-09-05 RX ADMIN — DIGOXIN 125 MCG: 125 TABLET ORAL at 08:16

## 2022-09-05 RX ADMIN — ASPIRIN 81 MG: 81 TABLET, COATED ORAL at 08:16

## 2022-09-05 RX ADMIN — CARBIDOPA AND LEVODOPA 2 TABLET: 25; 100 TABLET, EXTENDED RELEASE ORAL at 08:21

## 2022-09-05 RX ADMIN — INSULIN GLARGINE 12 UNITS: 100 INJECTION, SOLUTION SUBCUTANEOUS at 21:21

## 2022-09-05 RX ADMIN — IPRATROPIUM BROMIDE AND ALBUTEROL SULFATE 3 ML: .5; 2.5 SOLUTION RESPIRATORY (INHALATION) at 15:00

## 2022-09-05 RX ADMIN — ACETAZOLAMIDE 250 MG: 250 TABLET ORAL at 08:21

## 2022-09-05 RX ADMIN — ROPINIROLE HYDROCHLORIDE 1 MG: 1 TABLET, FILM COATED ORAL at 21:06

## 2022-09-05 RX ADMIN — LORAZEPAM 0.5 MG: 0.5 TABLET ORAL at 00:31

## 2022-09-05 RX ADMIN — INSULIN LISPRO 3 UNITS: 100 INJECTION, SOLUTION INTRAVENOUS; SUBCUTANEOUS at 08:21

## 2022-09-05 RX ADMIN — BUDESONIDE 500 MCG: 0.5 SUSPENSION RESPIRATORY (INHALATION) at 18:43

## 2022-09-05 RX ADMIN — BUMETANIDE 1 MG: 1 TABLET ORAL at 08:16

## 2022-09-05 RX ADMIN — Medication 10 ML: at 21:12

## 2022-09-05 RX ADMIN — HEPARIN 100 UNITS: 100 SYRINGE at 12:02

## 2022-09-05 RX ADMIN — SUCRALFATE 1 G: 1 TABLET ORAL at 12:07

## 2022-09-05 RX ADMIN — IPRATROPIUM BROMIDE AND ALBUTEROL SULFATE 3 ML: .5; 2.5 SOLUTION RESPIRATORY (INHALATION) at 18:43

## 2022-09-05 RX ADMIN — DOCUSATE SODIUM 100 MG: 100 CAPSULE, LIQUID FILLED ORAL at 21:06

## 2022-09-05 RX ADMIN — ATORVASTATIN CALCIUM 20 MG: 20 TABLET, FILM COATED ORAL at 21:06

## 2022-09-05 RX ADMIN — Medication 10 ML: at 12:02

## 2022-09-05 RX ADMIN — IPRATROPIUM BROMIDE AND ALBUTEROL SULFATE 3 ML: .5; 2.5 SOLUTION RESPIRATORY (INHALATION) at 05:06

## 2022-09-05 RX ADMIN — APIXABAN 5 MG: 5 TABLET, FILM COATED ORAL at 21:05

## 2022-09-05 RX ADMIN — BUDESONIDE 500 MCG: 0.5 SUSPENSION RESPIRATORY (INHALATION) at 05:06

## 2022-09-05 RX ADMIN — BENZOIN RESIN: 1000 LIQUID TOPICAL at 21:11

## 2022-09-05 RX ADMIN — SUCRALFATE 1 G: 1 TABLET ORAL at 21:06

## 2022-09-05 RX ADMIN — INSULIN LISPRO 3 UNITS: 100 INJECTION, SOLUTION INTRAVENOUS; SUBCUTANEOUS at 12:08

## 2022-09-05 RX ADMIN — CARBIDOPA AND LEVODOPA 2 TABLET: 25; 100 TABLET, EXTENDED RELEASE ORAL at 22:43

## 2022-09-05 RX ADMIN — INSULIN GLARGINE 12 UNITS: 100 INJECTION, SOLUTION SUBCUTANEOUS at 08:22

## 2022-09-05 RX ADMIN — MONTELUKAST 10 MG: 10 TABLET, FILM COATED ORAL at 21:06

## 2022-09-05 RX ADMIN — METOPROLOL SUCCINATE 75 MG: 50 TABLET, EXTENDED RELEASE ORAL at 08:16

## 2022-09-05 ASSESSMENT — PAIN SCALES - WONG BAKER
WONGBAKER_NUMERICALRESPONSE: 0

## 2022-09-05 ASSESSMENT — PAIN SCALES - GENERAL
PAINLEVEL_OUTOF10: 0
PAINLEVEL_OUTOF10: 0

## 2022-09-05 NOTE — PLAN OF CARE
Patient POCT BGlu 356; Call to hospitalist per order protocol. Waiting for return call.  2117 9/4/2022

## 2022-09-05 NOTE — PROGRESS NOTES
[facility] Progress Note    Admitting Date and Time: 8/24/2022 11:55 AM  Admit Dx: Atrial fibrillation with rapid ventricular response (HCC) [I48.91]    Subjective:  Patient is being followed for Atrial fibrillation with rapid ventricular response ([address] [I48.91]     Seen and examined at bedside  Awake and alert   Afebrile  On 3 L via NC - no sob or cough   No diarrhea, nausea, vomiting   No acute events overnight   Using bedside commode - increased urination with diuretics   No complaints at this time     ROS: denies fever, chills, cp, sob, n/v, HA unless stated above.       insulin glargine  12 Units SubCUTAneous BID    bumetanide  1 mg Oral BID    metoprolol succinate  75 mg Oral BID    acetaZOLAMIDE  250 mg Oral Daily    predniSONE  40 mg Oral Daily    digoxin  125 mcg Oral Daily    sodium chloride flush  5-40 mL IntraVENous 2 times per day    heparin flush  1 mL IntraVENous 2 times per day    benzoin compound   Topical Nightly    insulin lispro  0-8 Units SubCUTAneous TID WC    insulin lispro  0-4 Units SubCUTAneous Nightly    insulin lispro  3 Units SubCUTAneous TID WC    aspirin EC  81 mg Oral Daily    apixaban  5 mg Oral BID    rOPINIRole  1 mg Oral TID    sucralfate  1 g Oral 4x Daily    pantoprazole  40 mg Oral QAM    montelukast  10 mg Oral Nightly    miconazole  1 each Topical BID    ipratropium-albuterol  1 vial Inhalation 4x Daily    carbidopa-levodopa  2 tablet Oral TID    budesonide  0.5 mg Nebulization BID    atorvastatin  20 mg Oral Nightly    sodium chloride flush  5-40 mL IntraVENous 2 times per day    melatonin  5 mg Oral Nightly     benzonatate, 100 mg, TID PRN  sodium chloride flush, 5-40 mL, PRN  sodium chloride, , PRN  heparin flush, 1 mL, PRN  mirtazapine, 15 mg, QHS PRN  LORazepam, 0.5 mg, Q12H PRN  glucose, 4 tablet, PRN  dextrose bolus, 125 mL, PRN   Or  dextrose bolus, 250 mL, PRN  glucagon (rDNA), 1 mg, PRN  dextrose, , Continuous PRN  polyethylene glycol, 17 g, Daily PRN  acetaminophen, 650 mg, Q6H PRN   Or  acetaminophen, 650 mg, Q6H PRN         Objective:    /71   Pulse 84   Temp 97.5 °F (36.4 °C) (Oral)   Resp 18   Ht 5' (1.524 m)   Wt 204 lb 6 oz (92.7 kg)   SpO2 100%   BMI 39.91 kg/m²     General Appearance: alert and oriented to person, place and time and in no acute distress + overweight   Skin: warm and dry  Head: normocephalic and atraumatic  Eyes: pupils equal, round, and reactive to light, extraocular eye movements intact, conjunctivae normal  Neck: neck supple and non tender without mass   Pulmonary/Chest: clear to auscultation bilaterally- no wheezes, rales or rhonchi, normal air movement, no respiratory distress  Cardiovascular: normal rate, normal S1 and S2 and no carotid bruits  Abdomen: soft, non-tender, non-distended, normal bowel sounds, no masses or organomegaly  Extremities: no cyanosis, no clubbing and + BLE edema  Neurologic: no cranial nerve deficit and speech normal    Recent Labs     09/03/22  0622 09/04/22  0920    140   K 3.5 3.2*   CL 92* 92*   CO2 44* 39*   BUN 33* 30*   CREATININE 1.1* 1.1*   GLUCOSE 143* 215*   CALCIUM 8.9 9.0       Recent Labs     09/03/22  0622   WBC 7.1   RBC 3.57   HGB 10.1*   HCT 33.3*   MCV 93.3   MCH 28.3   MCHC 30.3*   RDW 15.2*      MPV 10.4       Radiology: reviewed       Assessment:    Principal Problem:    Acute respiratory failure with hypoxia and hypercapnia (HCC)  Active Problems:    Acute decompensated heart failure (HCC)    Encephalopathy    Pleural effusion, right    Acute confusion    Chronic diastolic (congestive) heart failure (HCC)    Macrocytosis    Other specified anemias    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HCC)    Puerperal sepsis with acute hypoxic respiratory failure without septic shock (HCC)    Pulmonary HTN (HCC)    Chronic anticoagulation    RVF (right ventricular failure) (HCC)    Metabolic alkalosis    Diabetes mellitus (HCC)    Paroxysmal atrial fibrillation [facility])    Atrial fibrillation with RVR ([address]    Coronary artery disease involving native coronary artery of native heart without angina pectoris  Resolved Problems:    * No resolved hospital problems. *    Plan:  1. Acute on chronic  Respiratory failure with hypoxia and hypercapnia   -Currently on 2L- wean to keep O2 sats >92%  States baseline 3 L at nursing home?   -Repeat CXR 09/02 showed stable effusion  - continue on prednisone daily   - REPEAT CXR TODAY AND PULMONARY CONSULTED - APPRECIATE RECS      2. SAHNTANU  - Cr 1.0 > 1.1   - avoid nephrotoxins   - trend labs      3. Atrial fibrillation   - cardiology following PRN - appreciate recs   - switched to Toprol XL 50 mg bid 8/29, increased dose to 75 mg po bid 8/31   - continue eliquis, digoxin  - continue Bumex and Diamox - I/O not accurately recorded   Continue Telemetry monitoring- currently showing afib. Dig level 0.6     4. T2DM   -continue Diabetic diet, Lantus 3 units of Humalog with meals and MDSS insulin scale, hypoglycemia protocol  - A1C 6.1%  -Lantus increased from 10 units BID to 12 units BID. 5.   metabolic Encephalopathy  - RESOLVED      6. Metabolic alkalosis   - Unsure of cause, possible contraction alkalosis, chloride is also depleted. Also possible compensatory for chronic hypercapnic respiratory failure. Patient refused ABG. -Started diamox 8/31, today bicarb 44, anion gap 7   - continue to monitor    CODE: full  DVT prophylaxis:eliquis   Discharge dispo: [place] [address]     30 minutes time spent reviewing patient chart, assessing patient, discussing plan of care with patient and family, discussing plan of care with collaborating physician, and charting.      Electronically signed by RASHARD Reid NP on 9/5/2022 at 9:46 AM

## 2022-09-05 NOTE — PLAN OF CARE
Problem: Discharge Planning  Goal: Discharge to home or other facility with appropriate resources  Outcome: Progressing     Problem: ABCDS Injury Assessment  Goal: Absence of physical injury  Outcome: Progressing     Problem: Safety - Adult  Goal: Free from fall injury  Outcome: Progressing     Problem: Skin/Tissue Integrity  Goal: Absence of new skin breakdown  Description: 1. Monitor for areas of redness and/or skin breakdown  2. Assess vascular access sites hourly  3. Every 4-6 hours minimum:  Change oxygen saturation probe site  4. Every 4-6 hours:  If on nasal continuous positive airway pressure, respiratory therapy assess nares and determine need for appliance change or resting period.   Outcome: Progressing     Problem: Chronic Conditions and Co-morbidities  Goal: Patient's chronic conditions and co-morbidity symptoms are monitored and maintained or improved  Outcome: Progressing     Problem: Pain  Goal: Verbalizes/displays adequate comfort level or baseline comfort level  Outcome: Progressing     Problem: Nutrition Deficit:  Goal: Optimize nutritional status  Outcome: Progressing

## 2022-09-05 NOTE — CONSULTS
PULMONARY MEDICINE CONSULT    Consult requested by: Lilly Ca  Reason for consult: Persistent pleural effusion, refused ABG, hypoxemic respiratory failure    HPI  66year old person with PMH of  morbid obesity with BMI 39,  HFpEF, obesity hypoventilation syndrome, SERENA, atrial fibrillation, anxiety, CAD, breast cancer, CKD, as described below admitted to hospital for management of acute on chronic respiratory failure, right pleural effusion, atrial fibrillation, SHANTANU, diabetes mellitus, metabolic encephalopathy. I was consulted for evaluation of respiratory failure and plural effusion. Of note, the patient refuses ABG! Ms Chandrika Chris has been managed with diuresis, digoxin, afterload reduction. Ms Chandrika Chris had thoracentesis on 8/26 demonstrating a right trasudative pleural effusion during this admission. Of note the patient has had thoracentesis on 7/8/2022, 7/20/2022 and 8/26/2022, cytology on 7/8 and 7/20 demonstrated no malignancy, pending cytology on 8/26; culture of pleural fluid demonstrated gram positive cocci in pairs on 7/20 and a transudative effusion.      Review of Systems - History obtained from the patient  General ROS: Positive for dyspnea, negative for - chills, fatigue, fever, or malaise  Psychological ROS: negative  Ophthalmic ROS: negative for - blurry vision or decreased vision  ENT ROS: negative for - headaches, hearing change, nasal congestion, or nasal discharge  Allergy and Immunology ROS: negative for - postnasal drip  Hematological and Lymphatic ROS: negative for - bleeding problems or blood clots  Endocrine ROS: negative for - polydipsia/polyuria  Breast ROS: negative for breast lumps  Respiratory ROS: positive for - cough and shortness of breath  Cardiovascular ROS: negative for - chest pain, edema, orthopnea, or palpitations  Gastrointestinal ROS: no abdominal pain, change in bowel habits, or black or bloody stools  Genito-Urinary ROS: no dysuria, trouble voiding, or hematuria  Musculoskeletal ROS: negative for - muscular weakness  Neurological ROS: no TIA or stroke symptoms  Dermatological ROS: negative for - rash      /72   Pulse 90   Temp 97.3 °F (36.3 °C) (Infrared)   Resp 18   Ht 5' (1.524 m)   Wt 204 lb 6 oz (92.7 kg)   SpO2 97%   BMI 39.91 kg/m²     General: Awake, oriented to place, time and person  HEENT: No head lesions, PERRL, EOMI, mouth without lesions, no nasal lesions, no cervical adenopathy palpated  Respiratory: Lungs with equal breath sounds bilaterally, no adventitious sounds auscultated, no accessory muscle use  CV: Regular rate, no murmurs, no JVD, no leg edema  Abdomen: Soft, non tender, + bowel sounds, no lesions  Skin: Hydrated, adequate turgor, no rash, capillary refill <2 seconds  Extremities: Muscular strength 5/5 in 4 limbs, moves 4 limbs spontaneously, distal pulses present  Neurology: Awake and alert, follows commands, moves 4 limbs on command and spontaneously,  neck is supple, no meningitic signs present. A/P:  1) Acute hypoxemic respiratory failure secondary to acute diastolic dysfunction, pleural effusion. --Oxygen supplementation to maintain sats > 89%, currently on room air  --Diuresis and afterload reduction as per primary team and cardiology  --During this admission the pleural effusion has been demonstrated to be transudative, I recommend against repeat thoracentesis as instrumentation of the pleural space increases the risk of hematoma/hemothorax and infection. The management of transudative pleural effusions include optimization of the inciting disease which in this case is heart failure. --Of note, the patient is receiving prednisone and this medication may worsen heart failure, consider stopping it.      2) SERENA  --Needs PSG and NIV    DVT prophylaxis - The patient is anticoagulated with Apixaban     Discharge instructions:   --The patient will need exercise oximetry prior to discharge  --Bronchodilators: PRN albuterol  --Please refer to outpatient pulmonology for PSG    We will not follow daily but PRN  Please call intensivist on call if the patient's condition were to worsen. Thank you for allowing us to participate in the care of Ms Sadia Bates. Rest of supportive care as per primary team      Component      Latest Ref Rng & Units 8/24/2022          12:27 PM   Total Protein      6.4 - 8.3 g/dL 6.7     Component      Latest Ref Rng & Units 8/26/2022           1:27 PM   Protein, Fluid      Not Established g/dL 2.0     Component      Latest Ref Rng & Units 8/26/2022 8/26/2022           1:27 PM  5:15 AM   LD, Fluid      Not Established U/L 49    Fluid Type       Pleural    LD      135 - 214 U/L  143       Past Medical History:   Diagnosis Date    A-fib ([facility]     Acute on chronic congestive heart failure (HCC)     Anxiety     Asthma     CAD (coronary artery disease) 01/21/2016    [facility]  breast ca 2006    right lumpectomy    Chronic kidney disease     nephrolithiasis    Depression     Diabetes mellitus (HCC)     H/O mammogram     Hx MRSA infection     toe infection january 2012    Hyperlipidemia     Hypertension     Lateral epicondylitis     SERENA on CPAP     Parkinson's disease ([facility]     Tubal ligation status      Family History   Problem Relation Age of Onset    Cancer Mother         Lung Ca    Cancer Father         lung Ca    Diabetes Sister     Heart Disease Sister     Seizures Son     Other Brother         sepsis     Social History     Socioeconomic History    Marital status:       Spouse name: Not on file    Number of children: Not on file    Years of education: Not on file    Highest education level: Not on file   Occupational History    Not on file   Tobacco Use    Smoking status: Never    Smokeless tobacco: Never   Vaping Use    Vaping Use: Never used   Substance and Sexual Activity    Alcohol use: No    Drug use: No    Sexual activity: Never   Other Topics Concern    Not on file   Social History Narrative    Not on file     Social Determinants of Health     Financial Resource Strain: Not on file   Food Insecurity: Not on file   Transportation Needs: Not on file   Physical Activity: Not on file   Stress: Not on file   Social Connections: Not on file   Intimate Partner Violence: Not on file   Housing Stability: Not on file     Current Facility-Administered Medications   Medication Dose Route Frequency Provider Last Rate Last Admin    benzonatate (TESSALON) capsule 100 mg  100 mg Oral TID PRN Stella Farrell MD        insulin glargine (LANTUS) injection vial 12 Units  12 Units SubCUTAneous BID Stella Farrell MD   12 Units at 09/05/22 0670    bumetanide (BUMEX) tablet 1 mg  1 mg Oral BID Ajit Maldonado MD   1 mg at 09/05/22 5647    metoprolol succinate (TOPROL XL) extended release tablet 75 mg  75 mg Oral BID Ajit Maldonado MD   75 mg at 09/05/22 9756    acetaZOLAMIDE (DIAMOX) tablet 250 mg  250 mg Oral Daily Stella Farrell MD   250 mg at 09/05/22 2202    predniSONE (DELTASONE) tablet 40 mg  40 mg Oral Daily Swapnil Teague MD   40 mg at 09/05/22 6871    digoxin (LANOXIN) tablet 125 mcg  125 mcg Oral Daily Ana Maria Mccall MD   125 mcg at 09/05/22 0816    sodium chloride flush 0.9 % injection 5-40 mL  5-40 mL IntraVENous 2 times per day Carey Ortiz, DO   10 mL at 09/04/22 2110    sodium chloride flush 0.9 % injection 5-40 mL  5-40 mL IntraVENous PRN Carey Ortiz, DO        0.9 % sodium chloride infusion   IntraVENous PRN Carey Ortiz, DO        heparin flush 100 UNIT/ML injection 100 Units  1 mL IntraVENous 2 times per day Emmanuel Hughes DO   100 Units at 09/05/22 1202    heparin flush 100 UNIT/ML injection 100 Units  1 mL IntraCATHeter PRN Carey Ortiz, DO        benzoin compound solution   Topical Nightly Carey Ortiz, DO   Given at 09/04/22 2100    insulin lispro (HUMALOG) injection vial 0-8 Units  0-8 Units SubCUTAneous TID  aCrey Ortiz, DO   2 Units at 09/05/22 1204 insulin lispro (HUMALOG) injection vial 0-4 Units  0-4 Units SubCUTAneous Nightly Vinh Ortiz DO   4 Units at 09/04/22 2101    insulin lispro (HUMALOG) injection vial 3 Units  3 Units SubCUTAneous TID WC La Ortiz DO   3 Units at 09/05/22 1208    aspirin EC tablet 81 mg  81 mg Oral Daily La Ortiz DO   81 mg at 09/05/22 0816    apixaban (ELIQUIS) tablet 5 mg  5 mg Oral BID Vinh Ortiz DO   5 mg at 09/05/22 8480    rOPINIRole (REQUIP) tablet 1 mg  1 mg Oral TID Vinh Ortiz DO   1 mg at 09/05/22 1342    sucralfate (CARAFATE) tablet 1 g  1 g Oral 4x Daily La Ortiz DO   1 g at 09/05/22 1207    pantoprazole (PROTONIX) tablet 40 mg  40 mg Oral QAM La Ortiz DO   40 mg at 09/05/22 0902    montelukast (SINGULAIR) tablet 10 mg  10 mg Oral Nightly La Ortiz DO   10 mg at 09/04/22 2055    mirtazapine (REMERON) tablet 15 mg  15 mg Oral QHS PRN La Ortiz DO        miconazole (MICOTIN) 2 % powder 1 each  1 each Topical BID Vinh Ortiz DO   1 each at 09/05/22 3534    LORazepam (ATIVAN) tablet 0.5 mg  0.5 mg Oral Q12H PRN Vinh Ortiz DO   0.5 mg at 09/05/22 0031    ipratropium-albuterol (DUONEB) nebulizer solution 3 mL  1 vial Inhalation 4x Daily La Ortiz DO   3 mL at 09/05/22 1500    carbidopa-levodopa (SINEMET CR)  MG per extended release tablet 2 tablet  2 tablet Oral TID Vinh Ortiz DO   2 tablet at 09/05/22 1342    budesonide (PULMICORT) nebulizer suspension 500 mcg  0.5 mg Nebulization BID Vinh Ortiz DO   500 mcg at 09/05/22 0506    atorvastatin (LIPITOR) tablet 20 mg  20 mg Oral Nightly La Ortiz DO   20 mg at 09/04/22 2056    glucose chewable tablet 16 g  4 tablet Oral PRN Vinh Ortiz,         dextrose bolus 10% 125 mL  125 mL IntraVENous PRN Vinh Ortiz DO        Or    dextrose bolus 10% 250 mL  250 mL IntraVENous PRN La Ortiz DO        glucagon (rDNA) injection 1 mg  1 mg SubCUTAneous acetaminophen    OBJECTIVE:  Vitals:    09/05/22 1445   BP: 137/72   Pulse: 90   Resp: 18   Temp: 97.3 °F (36.3 °C)   SpO2: 97%     FiO2 : 40 %  O2 Flow Rate (L/min): 2 L/min (removed at this time)  O2 Device: Nasal cannula        LABS:  WBC   Date Value Ref Range Status   09/05/2022 10.6 4.5 - 11.5 E9/L Final   09/03/2022 7.1 4.5 - 11.5 E9/L Final   08/25/2022 9.0 4.5 - 11.5 E9/L Final     Hemoglobin   Date Value Ref Range Status   09/05/2022 10.9 (L) 11.5 - 15.5 g/dL Final   09/03/2022 10.1 (L) 11.5 - 15.5 g/dL Final   08/25/2022 9.0 (L) 11.5 - 15.5 g/dL Final     Hematocrit   Date Value Ref Range Status   09/05/2022 36.0 34.0 - 48.0 % Final   09/03/2022 33.3 (L) 34.0 - 48.0 % Final   08/25/2022 31.5 (L) 34.0 - 48.0 % Final     MCV   Date Value Ref Range Status   09/05/2022 90.9 80.0 - 99.9 fL Final   09/03/2022 93.3 80.0 - 99.9 fL Final   08/25/2022 98.4 80.0 - 99.9 fL Final     Platelets   Date Value Ref Range Status   09/05/2022 209 130 - 450 E9/L Final   09/03/2022 170 130 - 450 E9/L Final   08/25/2022 249 130 - 450 E9/L Final     Sodium   Date Value Ref Range Status   09/05/2022 136 132 - 146 mmol/L Final   09/04/2022 140 132 - 146 mmol/L Final   09/03/2022 143 132 - 146 mmol/L Final     Potassium   Date Value Ref Range Status   09/05/2022 4.4 3.5 - 5.0 mmol/L Final   09/04/2022 3.2 (L) 3.5 - 5.0 mmol/L Final   09/02/2022 3.5 3.5 - 5.0 mmol/L Final     Potassium reflex Magnesium   Date Value Ref Range Status   09/03/2022 3.5 3.5 - 5.0 mmol/L Final   08/29/2022 3.9 3.5 - 5.0 mmol/L Final   08/28/2022 4.0 3.5 - 5.0 mmol/L Final     Chloride   Date Value Ref Range Status   09/05/2022 91 (L) 98 - 107 mmol/L Final   09/04/2022 92 (L) 98 - 107 mmol/L Final   09/03/2022 92 (L) 98 - 107 mmol/L Final     CO2   Date Value Ref Range Status   09/05/2022 37 (H) 22 - 29 mmol/L Final   09/04/2022 39 (H) 22 - 29 mmol/L Final   09/03/2022 44 (HH) 22 - 29 mmol/L Final     BUN   Date Value Ref Range Status   09/05/2022 40 (H) 6 ml/min/1.73 sq.m. Results valid for patients 18 years and older. 09/03/2022 49 >=60 mL/min/1.73 Final     Comment:     Chronic Kidney Disease: less than 60 ml/min/1.73 sq.m. Kidney Failure: less than 15 ml/min/1.73 sq.m. Results valid for patients 18 years and older. GFR    Date Value Ref Range Status   09/05/2022 53  Final   09/04/2022 59  Final   09/03/2022 59  Final     Magnesium   Date Value Ref Range Status   09/03/2022 2.0 1.6 - 2.6 mg/dL Final   07/27/2022 2.1 1.6 - 2.6 mg/dL Final   07/26/2022 1.9 1.6 - 2.6 mg/dL Final     Phosphorus   Date Value Ref Range Status   07/27/2022 3.9 2.5 - 4.5 mg/dL Final   07/26/2022 4.6 (H) 2.5 - 4.5 mg/dL Final   07/25/2022 3.9 2.5 - 4.5 mg/dL Final     No results for input(s): PH, PO2, PCO2, HCO3, BE, O2SAT in the last 72 hours. RADIOLOGY:  XR CHEST PORTABLE   Final Result   Stable small to moderate right pleural effusion. XR CHEST PORTABLE   Final Result   1. Small right pleural effusion. 2.  No other significant interval change. IR GUIDED THORACENTESIS PLEURAL   Final Result   Successful ultrasound guided thoracentesis. XR CHEST 1 VIEW   Final Result   1. There is no right pneumothorax status post right thoracentesis   2. Right lung base atelectasis   3. The left lung is clear. XR CHEST PORTABLE   Final Result   1. Cardiomegaly with findings of CHF   2. Moderate size right pleural effusion   3.  Trace left pleural effusion         XR CHEST PORTABLE    (Results Pending)     PROBLEM LIST:  Principal Problem:    Acute respiratory failure with hypoxia and hypercapnia (HCC)  Active Problems:    Acute decompensated heart failure (HCC)    Encephalopathy    Pleural effusion, right    Acute confusion    Chronic diastolic (congestive) heart failure (HCC)    Macrocytosis    Other specified anemias    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[facility]    Puerperal sepsis with acute hypoxic respiratory failure without septic shock (HCC)    Pulmonary HTN (HCC)    Chronic anticoagulation    RVF (right ventricular failure) (HCC)    Metabolic alkalosis    Diabetes mellitus (HCC)    Paroxysmal atrial fibrillation (HCC)    Atrial fibrillation with RVR ([address]    Coronary artery disease involving native coronary artery of native heart without angina pectoris  Resolved Problems:    * No resolved hospital problems.  *      Dyllan Baker MD  Pulmonary and Critical Care Medicine

## 2022-09-06 LAB
METER GLUCOSE: 116 MG/DL (ref 74–99)
METER GLUCOSE: 158 MG/DL (ref 74–99)
METER GLUCOSE: 176 MG/DL (ref 74–99)
METER GLUCOSE: 315 MG/DL (ref 74–99)

## 2022-09-06 PROCEDURE — 6370000000 HC RX 637 (ALT 250 FOR IP): Performed by: INTERNAL MEDICINE

## 2022-09-06 PROCEDURE — 6370000000 HC RX 637 (ALT 250 FOR IP): Performed by: FAMILY MEDICINE

## 2022-09-06 PROCEDURE — 6370000000 HC RX 637 (ALT 250 FOR IP): Performed by: STUDENT IN AN ORGANIZED HEALTH CARE EDUCATION/TRAINING PROGRAM

## 2022-09-06 PROCEDURE — 97110 THERAPEUTIC EXERCISES: CPT

## 2022-09-06 PROCEDURE — 94640 AIRWAY INHALATION TREATMENT: CPT

## 2022-09-06 PROCEDURE — 6360000002 HC RX W HCPCS: Performed by: FAMILY MEDICINE

## 2022-09-06 PROCEDURE — 97530 THERAPEUTIC ACTIVITIES: CPT

## 2022-09-06 PROCEDURE — 94660 CPAP INITIATION&MGMT: CPT

## 2022-09-06 PROCEDURE — 2700000000 HC OXYGEN THERAPY PER DAY

## 2022-09-06 PROCEDURE — 99232 SBSQ HOSP IP/OBS MODERATE 35: CPT | Performed by: STUDENT IN AN ORGANIZED HEALTH CARE EDUCATION/TRAINING PROGRAM

## 2022-09-06 PROCEDURE — 2060000000 HC ICU INTERMEDIATE R&B

## 2022-09-06 PROCEDURE — 2580000003 HC RX 258: Performed by: FAMILY MEDICINE

## 2022-09-06 PROCEDURE — 82962 GLUCOSE BLOOD TEST: CPT

## 2022-09-06 RX ORDER — INSULIN GLARGINE 100 [IU]/ML
13 INJECTION, SOLUTION SUBCUTANEOUS 2 TIMES DAILY
Status: DISCONTINUED | OUTPATIENT
Start: 2022-09-06 | End: 2022-09-08 | Stop reason: HOSPADM

## 2022-09-06 RX ADMIN — HEPARIN 100 UNITS: 100 SYRINGE at 08:08

## 2022-09-06 RX ADMIN — LORAZEPAM 0.5 MG: 0.5 TABLET ORAL at 22:17

## 2022-09-06 RX ADMIN — PREDNISONE 40 MG: 20 TABLET ORAL at 08:06

## 2022-09-06 RX ADMIN — ANTI-FUNGAL POWDER MICONAZOLE NITRATE TALC FREE 1 EACH: 1.42 POWDER TOPICAL at 08:06

## 2022-09-06 RX ADMIN — INSULIN LISPRO 3 UNITS: 100 INJECTION, SOLUTION INTRAVENOUS; SUBCUTANEOUS at 13:06

## 2022-09-06 RX ADMIN — SUCRALFATE 1 G: 1 TABLET ORAL at 16:17

## 2022-09-06 RX ADMIN — BUDESONIDE 500 MCG: 0.5 SUSPENSION RESPIRATORY (INHALATION) at 05:14

## 2022-09-06 RX ADMIN — IPRATROPIUM BROMIDE AND ALBUTEROL SULFATE 3 ML: .5; 2.5 SOLUTION RESPIRATORY (INHALATION) at 18:23

## 2022-09-06 RX ADMIN — ANTI-FUNGAL POWDER MICONAZOLE NITRATE TALC FREE 1 EACH: 1.42 POWDER TOPICAL at 20:32

## 2022-09-06 RX ADMIN — CARBIDOPA AND LEVODOPA 2 TABLET: 25; 100 TABLET, EXTENDED RELEASE ORAL at 08:06

## 2022-09-06 RX ADMIN — BENZOIN RESIN: 1000 LIQUID TOPICAL at 20:33

## 2022-09-06 RX ADMIN — ATORVASTATIN CALCIUM 20 MG: 20 TABLET, FILM COATED ORAL at 20:32

## 2022-09-06 RX ADMIN — DIGOXIN 125 MCG: 125 TABLET ORAL at 08:06

## 2022-09-06 RX ADMIN — IPRATROPIUM BROMIDE AND ALBUTEROL SULFATE 3 ML: .5; 2.5 SOLUTION RESPIRATORY (INHALATION) at 09:19

## 2022-09-06 RX ADMIN — CARBIDOPA AND LEVODOPA 2 TABLET: 25; 100 TABLET, EXTENDED RELEASE ORAL at 20:36

## 2022-09-06 RX ADMIN — Medication 10 ML: at 08:09

## 2022-09-06 RX ADMIN — ACETAZOLAMIDE 250 MG: 250 TABLET ORAL at 08:06

## 2022-09-06 RX ADMIN — ROPINIROLE HYDROCHLORIDE 1 MG: 1 TABLET, FILM COATED ORAL at 13:05

## 2022-09-06 RX ADMIN — INSULIN LISPRO 3 UNITS: 100 INJECTION, SOLUTION INTRAVENOUS; SUBCUTANEOUS at 16:18

## 2022-09-06 RX ADMIN — SUCRALFATE 1 G: 1 TABLET ORAL at 13:05

## 2022-09-06 RX ADMIN — METOPROLOL SUCCINATE 75 MG: 50 TABLET, EXTENDED RELEASE ORAL at 20:32

## 2022-09-06 RX ADMIN — BUMETANIDE 1 MG: 1 TABLET ORAL at 08:06

## 2022-09-06 RX ADMIN — MONTELUKAST 10 MG: 10 TABLET, FILM COATED ORAL at 20:32

## 2022-09-06 RX ADMIN — SUCRALFATE 1 G: 1 TABLET ORAL at 22:18

## 2022-09-06 RX ADMIN — ROPINIROLE HYDROCHLORIDE 1 MG: 1 TABLET, FILM COATED ORAL at 20:32

## 2022-09-06 RX ADMIN — PANTOPRAZOLE SODIUM 40 MG: 40 TABLET, DELAYED RELEASE ORAL at 08:06

## 2022-09-06 RX ADMIN — POLYETHYLENE GLYCOL 3350 17 G: 17 POWDER, FOR SOLUTION ORAL at 13:05

## 2022-09-06 RX ADMIN — CARBIDOPA AND LEVODOPA 2 TABLET: 25; 100 TABLET, EXTENDED RELEASE ORAL at 13:05

## 2022-09-06 RX ADMIN — Medication 5 MG: at 20:32

## 2022-09-06 RX ADMIN — INSULIN GLARGINE 12 UNITS: 100 INJECTION, SOLUTION SUBCUTANEOUS at 08:16

## 2022-09-06 RX ADMIN — ROPINIROLE HYDROCHLORIDE 1 MG: 1 TABLET, FILM COATED ORAL at 08:06

## 2022-09-06 RX ADMIN — APIXABAN 5 MG: 5 TABLET, FILM COATED ORAL at 20:32

## 2022-09-06 RX ADMIN — BUMETANIDE 1 MG: 1 TABLET ORAL at 20:32

## 2022-09-06 RX ADMIN — HEPARIN 100 UNITS: 100 SYRINGE at 20:33

## 2022-09-06 RX ADMIN — IPRATROPIUM BROMIDE AND ALBUTEROL SULFATE 3 ML: .5; 2.5 SOLUTION RESPIRATORY (INHALATION) at 14:23

## 2022-09-06 RX ADMIN — METOPROLOL SUCCINATE 75 MG: 50 TABLET, EXTENDED RELEASE ORAL at 08:06

## 2022-09-06 RX ADMIN — Medication 10 ML: at 20:34

## 2022-09-06 RX ADMIN — INSULIN LISPRO 3 UNITS: 100 INJECTION, SOLUTION INTRAVENOUS; SUBCUTANEOUS at 08:17

## 2022-09-06 RX ADMIN — IPRATROPIUM BROMIDE AND ALBUTEROL SULFATE 3 ML: .5; 2.5 SOLUTION RESPIRATORY (INHALATION) at 05:14

## 2022-09-06 RX ADMIN — ASPIRIN 81 MG: 81 TABLET, COATED ORAL at 08:06

## 2022-09-06 RX ADMIN — INSULIN GLARGINE 13 UNITS: 100 INJECTION, SOLUTION SUBCUTANEOUS at 20:50

## 2022-09-06 RX ADMIN — Medication 10 ML: at 08:08

## 2022-09-06 RX ADMIN — SUCRALFATE 1 G: 1 TABLET ORAL at 08:07

## 2022-09-06 RX ADMIN — APIXABAN 5 MG: 5 TABLET, FILM COATED ORAL at 08:06

## 2022-09-06 RX ADMIN — INSULIN LISPRO 4 UNITS: 100 INJECTION, SOLUTION INTRAVENOUS; SUBCUTANEOUS at 20:49

## 2022-09-06 RX ADMIN — BUDESONIDE 500 MCG: 0.5 SUSPENSION RESPIRATORY (INHALATION) at 18:24

## 2022-09-06 ASSESSMENT — PAIN SCALES - WONG BAKER
WONGBAKER_NUMERICALRESPONSE: 0
WONGBAKER_NUMERICALRESPONSE: 0

## 2022-09-06 NOTE — PROGRESS NOTES
Physical Therapy  Physical Therapy Treatment Note/Plan of Care    Room #:  6144/1960-98  Patient Name: Jeannie Montalvo  YOB: 1950  MRN: [id number]    Date of Service: 9/6/2022     Tentative placement recommendation: Subacute rehab or Long Term Acute Care  Equipment recommendation:  To be determined      Evaluating Physical Therapist: Sangita Sutton, PT, DPT #[id number]      Specific Provider Orders/Date/Referring Provider :     08/28/22 1430    PT eval and treat  Start:  08/28/22 1430,   End:  08/28/22 1430,   ONE TIME,   Standing Count:  1 Occurrences,   R         Angle Records, DO Acknowledge New     Admitting Diagnosis:   Atrial fibrillation with rapid ventricular response ([address] [I48.91]      Surgery: none  Visit Diagnoses         Codes    Atrial fibrillation with RVR ([address]     I48.91            Patient Active Problem List   Diagnosis    Depression with anxiety    Osteoporosis    Asthma    Hyperlipidemia    Mitral regurgitation    Obstructive sleep apnea syndrome    Psoriasis    Diabetes mellitus ([address]    Parkinson's disease ([address]    Primary hypertension    Microalbuminuria    Morbid obesity ([address]    RLS (restless legs syndrome)    Generalized weakness    Inability to walk    Hypothyroidism    Chest pain    Acute asthma exacerbation    Asthma exacerbation, mild    Paroxysmal atrial fibrillation (HCC)    Atrial fibrillation with RVR (HCC)    Acute on chronic congestive heart failure ([address]    Coronary artery disease involving native coronary artery of native heart without angina pectoris    Dysphagia    Hepatosplenomegaly    Acute decompensated heart failure (HCC)    Acute diastolic (congestive) heart failure (HCC)    Nonrheumatic tricuspid valve regurgitation    Tobacco abuse    Recurrent syncope    Septic shock ([address]    Seizure-like activity (HCC)    SHANTANU (acute kidney injury) ([address]    Pancytopenia ([address]    Thrombocytopenia ([address]    Encephalopathy    Acute respiratory failure with hypoxia ([address] for adequate transfer of weight onto lower extremities and use of gait device if needed, Cues for hand placement, technique and safety. Provide stabilization to prevent fall , Facilitate weight shift forward on to lower extremities and provide necessary stabilization of bilateral lower extremities , Support transfer of weight on to lower extremities, and Assist with extension of knees trunk and hip to accept weight transfer   -Gait: Gait training, Standing activities to improve: base of support, weight shift, weight bearing , Exercises to improve trunk control, Exercises to improve hip and knee control, Performance of protected weight bearing activities, and Activities to increase weight bearing   -Endurance: Utilize Supervised activities to increase level of endurance to allow for safe functional mobility including transfers and gait  and Use graduated activities to promote good breathing techniques and provide support and education to maximize respiratory function    PT long term treatment goals are located in below grid    Patient and or family understand(s) diagnosis, prognosis, and plan of care.     Frequency of treatments: Patient will be seen  dailyyy         Prior Level of Function: Patient ambulated with wheeled walker   Rehab Potential: good - for baseline    Past medical history:   Past Medical History:   Diagnosis Date    A-fib ([facility]     Acute on chronic congestive heart failure (HCC)     Anxiety     Asthma     CAD (coronary artery disease) 01/21/2016    Cancer ([facility]  breast ca 2006    right lumpectomy    Chronic kidney disease     nephrolithiasis    Depression     Diabetes mellitus ([facility]     H/O mammogram     Hx MRSA infection     toe infection january 2012    Hyperlipidemia     Hypertension     Lateral epicondylitis     SERENA on CPAP     Parkinson's disease ([facility]     Tubal ligation status      Past Surgical History:   Procedure Laterality Date    BREAST LUMPECTOMY      BREAST REDUCTION SURGERY CARDIAC CATHETERIZATION  4/28/2014    Dr. Pravin Joel  01/11/2022    Dr. Sebastian Beach  7/29/15    CT GUIDED CHEST TUBE  7/8/2022    CT GUIDED CHEST TUBE 7/8/2022 Prince Franki MD [facility]    ENDOSCOPY, COLON, DIAGNOSTIC  7/19/15    GALLBLADDER SURGERY      LUMBAR LAMINECTOMY      TOE AMPUTATION      TONSILLECTOMY      UPPER GASTROINTESTINAL ENDOSCOPY      UPPER GASTROINTESTINAL ENDOSCOPY N/A 7/19/2022    EGD ESOPHAGOGASTRODUODENOSCOPY performed by Yani Salas MD at [address]:    Precautions: Up with assistance, falls, O2, and Afib, morbid obesity      Social history: Patient lives at [employer] owned: [address] [facility] [place], [place], and [address] [facility] Mobility Inpatient   How much difficulty turning over in bed?: A Little  How much difficulty sitting down on / standing up from a chair with arms?: A Little  How much difficulty moving from lying on back to sitting on side of bed?: A Little  How much help from another person moving to and from a bed to a chair?: A Little  How much help from another person needed to walk in hospital room?: A Lot  How much help from another person for climbing 3-5 steps with a railing?: A Lot  AM-PAC Inpatient Mobility Raw Score : 16  AM-PAC Inpatient T-Scale Score : 40.78  Mobility Inpatient CMS 0-100% Score: 54.16  Mobility Inpatient CMS G-Code Modifier : CK    Nursing cleared patient for PT treatment. Patient was happy with her progress with therapy this session. OBJECTIVE;   Initial Evaluation  Date: 8/29/2022 Treatment Date:  9/6/2022     Short Term/ Long Term   Goals   Was pt agreeable to Eval/treatment? Yes Yes  To be met in 3 days   Pain level   0 /10   0/10    Bed Mobility    Rolling: Moderate assist of 1    Supine to sit: Moderate assist of 1    Sit to supine:  Moderate assist of 1    Scooting: Minimal assist of 1   Rolling: Supervision    Supine to sit: Supervision    Sit to supine: Supervision    Scooting: Supervision     Rolling: Independent    Supine to sit: Independent    Sit to supine: Independent    Scooting: Independent     Transfers Sit to stand: Minimal assist of 1  Sit to stand: Supervision x multiple reps    Sit to stand: Independent    Ambulation     3-5 feet using  wheeled walker with Minimal assist of 1   for walker control, walker approximation, balance, and safety 2x2 feet and 2x 12  feet using  wheeled walker with Minimal assist of 1   for balance, upright, and safety       > 75 feet using  wheeled walker with Modified Independent    Stair negotiation: ascended and descended   Not assessed  not assessed        ROM Within functional limits    Increase range of motion 10% of affected joints    Strength BUE:  refer to OT eval  RLE:  4-/5  LLE:  4-/5  Increase strength in affected mm groups by 1/3 grade   Balance Sitting EOB:  fair +  Dynamic Standing:  fair  Sitting EOB: good   Dynamic Standing: fair with wheeled walker   Sitting EOB:  good   Dynamic Standing: fair +     Patient is Alert & Oriented x person, place, time, and situation and follows directions    Sensation:  Patient  denies numbness/tingling   Edema:  no   Endurance: fair      Vitals: room air   Blood Pressure at rest  Blood Pressure during session    Heart Rate at rest Heart Rate during session   SPO2 at rest %  SPO2 during session 95%     Patient education  Patient educated on role of Physical Therapy, risks of immobility, safety and plan of care, energy conservation,  importance of mobility while in hospital , purse lip breathing, and safety      Patient response to education:   Pt verbalized understanding Pt demonstrated skill Pt requires further education in this area   Yes Partial Yes      Treatment:  Patient practiced and was instructed/facilitated in the following treatment:patient at edge of bed and sat for 8 minutes. Static and dynamic standing.  Seated exercise gait as in chart. On/off commode. Assisted back to supine, positioned for comfort. Therapeutic Exercises:  long arc quad and seated marching x 10 reps  x2 reps for LAQ    At end of session, patient in bed with     call light and phone within reach,  all lines and tubes intact, nursing notified. Patient would benefit from continued skilled Physical Therapy to improve functional independence and quality of life.          Patient's/ family goals   rehab    Time in  250  Time out  318    Total Treatment Time  28 minutes    CPT codes:  Therapeutic activities (81444)   17 minutes  1 unit(s)  Therapeutic exercises (58065)   8 minutes  1 unit(s)  Non billable time 3 minutes    MARIUM German#[id number]

## 2022-09-06 NOTE — CARE COORDINATION
9/6/2022 1300 CM Note:  CM for transition of care needs at d/c. Pt resides at [address] [place] [address] and will return there. Per [facility] she is a long term resident and a bed hold, if skilled need at d/c they will submit for precert but won't have to wait for auth. Pt refusing to wear Bipap so facility won't need to provide one. Will need signed [place] and Rapid Covid day of d/c. Per dr Seferino Schwab pt may d/c in next couple days. CM will continue to follow.   Electronically signed by Fabio Homans, RN on 9/6/2022 at 1:33 PM

## 2022-09-06 NOTE — PROGRESS NOTES
[facility] Progress Note    Admitting Date and Time: 8/24/2022 11:55 AM  Admit Dx: Atrial fibrillation with rapid ventricular response ([address] [I48.91]    Subjective:    Pt feels much better than before, feels breathing is improved, cough is improved. Per RN: nothing to report    ROS: denies fever, chills, cp, sob, n/v, HA unless stated above.       [START ON 9/7/2022] predniSONE  30 mg Oral Daily    insulin glargine  12 Units SubCUTAneous BID    bumetanide  1 mg Oral BID    metoprolol succinate  75 mg Oral BID    acetaZOLAMIDE  250 mg Oral Daily    digoxin  125 mcg Oral Daily    sodium chloride flush  5-40 mL IntraVENous 2 times per day    heparin flush  1 mL IntraVENous 2 times per day    benzoin compound   Topical Nightly    insulin lispro  0-8 Units SubCUTAneous TID WC    insulin lispro  0-4 Units SubCUTAneous Nightly    insulin lispro  3 Units SubCUTAneous TID WC    aspirin EC  81 mg Oral Daily    apixaban  5 mg Oral BID    rOPINIRole  1 mg Oral TID    sucralfate  1 g Oral 4x Daily    pantoprazole  40 mg Oral QAM    montelukast  10 mg Oral Nightly    miconazole  1 each Topical BID    ipratropium-albuterol  1 vial Inhalation 4x Daily    carbidopa-levodopa  2 tablet Oral TID    budesonide  0.5 mg Nebulization BID    atorvastatin  20 mg Oral Nightly    sodium chloride flush  5-40 mL IntraVENous 2 times per day    melatonin  5 mg Oral Nightly     ondansetron, 4 mg, Q6H PRN  docusate sodium, 100 mg, BID PRN  benzonatate, 100 mg, TID PRN  sodium chloride flush, 5-40 mL, PRN  sodium chloride, , PRN  heparin flush, 1 mL, PRN  mirtazapine, 15 mg, QHS PRN  LORazepam, 0.5 mg, Q12H PRN  glucose, 4 tablet, PRN  dextrose bolus, 125 mL, PRN   Or  dextrose bolus, 250 mL, PRN  glucagon (rDNA), 1 mg, PRN  dextrose, , Continuous PRN  polyethylene glycol, 17 g, Daily PRN  acetaminophen, 650 mg, Q6H PRN   Or  acetaminophen, 650 mg, Q6H PRN       Objective:    /77   Pulse 95   Temp 97.5 °F (36.4 °C) possible contraction alkalosis, chloride is also depleted. Also possible compensatory for chronic hypercapnic respiratory failure. Patient refused ABG. -Started diamox 8/31, today bicarb 37 anion gap 8 yesterday   - continue to monitor    Dispo: Likely discharge back to facility tomorrow    NOTE: This report was transcribed using voice recognition software. Every effort was made to ensure accuracy; however, inadvertent computerized transcription errors may be present.     Electronically signed by David Newton MD on 9/6/2022 at 5:39 PM

## 2022-09-07 LAB
ANION GAP SERPL CALCULATED.3IONS-SCNC: 13 MMOL/L (ref 7–16)
ANION GAP SERPL CALCULATED.3IONS-SCNC: 9 MMOL/L (ref 7–16)
BASOPHILS ABSOLUTE: 0.01 E9/L (ref 0–0.2)
BASOPHILS RELATIVE PERCENT: 0.1 % (ref 0–2)
BUN BLDV-MCNC: 44 MG/DL (ref 6–23)
BUN BLDV-MCNC: 45 MG/DL (ref 6–23)
CALCIUM SERPL-MCNC: 8.8 MG/DL (ref 8.6–10.2)
CALCIUM SERPL-MCNC: 9.3 MG/DL (ref 8.6–10.2)
CHLORIDE BLD-SCNC: 94 MMOL/L (ref 98–107)
CHLORIDE BLD-SCNC: 96 MMOL/L (ref 98–107)
CO2: 30 MMOL/L (ref 22–29)
CO2: 36 MMOL/L (ref 22–29)
CREAT SERPL-MCNC: 1.3 MG/DL (ref 0.5–1)
CREAT SERPL-MCNC: 1.3 MG/DL (ref 0.5–1)
EOSINOPHILS ABSOLUTE: 0.02 E9/L (ref 0.05–0.5)
EOSINOPHILS RELATIVE PERCENT: 0.2 % (ref 0–6)
GFR AFRICAN AMERICAN: 49
GFR AFRICAN AMERICAN: 49
GFR NON-AFRICAN AMERICAN: 40 ML/MIN/1.73
GFR NON-AFRICAN AMERICAN: 40 ML/MIN/1.73
GLUCOSE BLD-MCNC: 131 MG/DL (ref 74–99)
GLUCOSE BLD-MCNC: 255 MG/DL (ref 74–99)
HCT VFR BLD CALC: 38.6 % (ref 34–48)
HEMOGLOBIN: 11.6 G/DL (ref 11.5–15.5)
IMMATURE GRANULOCYTES #: 0.07 E9/L
IMMATURE GRANULOCYTES %: 0.7 % (ref 0–5)
LYMPHOCYTES ABSOLUTE: 2.06 E9/L (ref 1.5–4)
LYMPHOCYTES RELATIVE PERCENT: 20.9 % (ref 20–42)
MCH RBC QN AUTO: 27.8 PG (ref 26–35)
MCHC RBC AUTO-ENTMCNC: 30.1 % (ref 32–34.5)
MCV RBC AUTO: 92.3 FL (ref 80–99.9)
METER GLUCOSE: 106 MG/DL (ref 74–99)
METER GLUCOSE: 157 MG/DL (ref 74–99)
METER GLUCOSE: 161 MG/DL (ref 74–99)
METER GLUCOSE: 307 MG/DL (ref 74–99)
MONOCYTES ABSOLUTE: 0.49 E9/L (ref 0.1–0.95)
MONOCYTES RELATIVE PERCENT: 5 % (ref 2–12)
NEUTROPHILS ABSOLUTE: 7.21 E9/L (ref 1.8–7.3)
NEUTROPHILS RELATIVE PERCENT: 73.1 % (ref 43–80)
PDW BLD-RTO: 15.4 FL (ref 11.5–15)
PLATELET # BLD: 203 E9/L (ref 130–450)
PMV BLD AUTO: 10.3 FL (ref 7–12)
POTASSIUM SERPL-SCNC: 3.7 MMOL/L (ref 3.5–5)
POTASSIUM SERPL-SCNC: 4.3 MMOL/L (ref 3.5–5)
RBC # BLD: 4.18 E12/L (ref 3.5–5.5)
SODIUM BLD-SCNC: 137 MMOL/L (ref 132–146)
SODIUM BLD-SCNC: 141 MMOL/L (ref 132–146)
WBC # BLD: 9.9 E9/L (ref 4.5–11.5)

## 2022-09-07 PROCEDURE — 99233 SBSQ HOSP IP/OBS HIGH 50: CPT | Performed by: STUDENT IN AN ORGANIZED HEALTH CARE EDUCATION/TRAINING PROGRAM

## 2022-09-07 PROCEDURE — 36415 COLL VENOUS BLD VENIPUNCTURE: CPT

## 2022-09-07 PROCEDURE — 6370000000 HC RX 637 (ALT 250 FOR IP): Performed by: FAMILY MEDICINE

## 2022-09-07 PROCEDURE — 97110 THERAPEUTIC EXERCISES: CPT

## 2022-09-07 PROCEDURE — 2060000000 HC ICU INTERMEDIATE R&B

## 2022-09-07 PROCEDURE — 82962 GLUCOSE BLOOD TEST: CPT

## 2022-09-07 PROCEDURE — 80048 BASIC METABOLIC PNL TOTAL CA: CPT

## 2022-09-07 PROCEDURE — 6370000000 HC RX 637 (ALT 250 FOR IP): Performed by: INTERNAL MEDICINE

## 2022-09-07 PROCEDURE — 85025 COMPLETE CBC W/AUTO DIFF WBC: CPT

## 2022-09-07 PROCEDURE — 2580000003 HC RX 258: Performed by: STUDENT IN AN ORGANIZED HEALTH CARE EDUCATION/TRAINING PROGRAM

## 2022-09-07 PROCEDURE — 2580000003 HC RX 258: Performed by: FAMILY MEDICINE

## 2022-09-07 PROCEDURE — 94640 AIRWAY INHALATION TREATMENT: CPT

## 2022-09-07 PROCEDURE — 6360000002 HC RX W HCPCS: Performed by: FAMILY MEDICINE

## 2022-09-07 PROCEDURE — 94660 CPAP INITIATION&MGMT: CPT

## 2022-09-07 PROCEDURE — 6370000000 HC RX 637 (ALT 250 FOR IP): Performed by: STUDENT IN AN ORGANIZED HEALTH CARE EDUCATION/TRAINING PROGRAM

## 2022-09-07 PROCEDURE — 97530 THERAPEUTIC ACTIVITIES: CPT

## 2022-09-07 RX ORDER — 0.9 % SODIUM CHLORIDE 0.9 %
500 INTRAVENOUS SOLUTION INTRAVENOUS ONCE
Status: COMPLETED | OUTPATIENT
Start: 2022-09-07 | End: 2022-09-07

## 2022-09-07 RX ORDER — ACETAZOLAMIDE 250 MG/1
125 TABLET ORAL DAILY
Status: DISCONTINUED | OUTPATIENT
Start: 2022-09-08 | End: 2022-09-07

## 2022-09-07 RX ORDER — 0.9 % SODIUM CHLORIDE 0.9 %
250 INTRAVENOUS SOLUTION INTRAVENOUS ONCE
Status: DISCONTINUED | OUTPATIENT
Start: 2022-09-07 | End: 2022-09-07

## 2022-09-07 RX ORDER — BUMETANIDE 1 MG/1
0.5 TABLET ORAL 2 TIMES DAILY
Status: DISCONTINUED | OUTPATIENT
Start: 2022-09-07 | End: 2022-09-08 | Stop reason: HOSPADM

## 2022-09-07 RX ADMIN — INSULIN LISPRO 3 UNITS: 100 INJECTION, SOLUTION INTRAVENOUS; SUBCUTANEOUS at 08:14

## 2022-09-07 RX ADMIN — DIGOXIN 125 MCG: 125 TABLET ORAL at 08:28

## 2022-09-07 RX ADMIN — ASPIRIN 81 MG: 81 TABLET, COATED ORAL at 08:28

## 2022-09-07 RX ADMIN — PREDNISONE 30 MG: 20 TABLET ORAL at 08:28

## 2022-09-07 RX ADMIN — IPRATROPIUM BROMIDE AND ALBUTEROL SULFATE 3 ML: .5; 2.5 SOLUTION RESPIRATORY (INHALATION) at 09:59

## 2022-09-07 RX ADMIN — BUMETANIDE 1 MG: 1 TABLET ORAL at 08:30

## 2022-09-07 RX ADMIN — METOPROLOL SUCCINATE 75 MG: 50 TABLET, EXTENDED RELEASE ORAL at 08:29

## 2022-09-07 RX ADMIN — SUCRALFATE 1 G: 1 TABLET ORAL at 08:29

## 2022-09-07 RX ADMIN — SUCRALFATE 1 G: 1 TABLET ORAL at 21:07

## 2022-09-07 RX ADMIN — Medication 10 ML: at 08:32

## 2022-09-07 RX ADMIN — IPRATROPIUM BROMIDE AND ALBUTEROL SULFATE 3 ML: .5; 2.5 SOLUTION RESPIRATORY (INHALATION) at 06:41

## 2022-09-07 RX ADMIN — INSULIN LISPRO 3 UNITS: 100 INJECTION, SOLUTION INTRAVENOUS; SUBCUTANEOUS at 17:15

## 2022-09-07 RX ADMIN — HEPARIN 100 UNITS: 100 SYRINGE at 21:14

## 2022-09-07 RX ADMIN — ATORVASTATIN CALCIUM 20 MG: 20 TABLET, FILM COATED ORAL at 21:07

## 2022-09-07 RX ADMIN — METOPROLOL SUCCINATE 75 MG: 50 TABLET, EXTENDED RELEASE ORAL at 21:07

## 2022-09-07 RX ADMIN — INSULIN GLARGINE 13 UNITS: 100 INJECTION, SOLUTION SUBCUTANEOUS at 21:15

## 2022-09-07 RX ADMIN — PANTOPRAZOLE SODIUM 40 MG: 40 TABLET, DELAYED RELEASE ORAL at 08:29

## 2022-09-07 RX ADMIN — Medication 10 ML: at 08:30

## 2022-09-07 RX ADMIN — APIXABAN 5 MG: 5 TABLET, FILM COATED ORAL at 21:07

## 2022-09-07 RX ADMIN — ROPINIROLE HYDROCHLORIDE 1 MG: 1 TABLET, FILM COATED ORAL at 08:32

## 2022-09-07 RX ADMIN — INSULIN LISPRO 6 UNITS: 100 INJECTION, SOLUTION INTRAVENOUS; SUBCUTANEOUS at 16:36

## 2022-09-07 RX ADMIN — BUMETANIDE 0.5 MG: 1 TABLET ORAL at 21:07

## 2022-09-07 RX ADMIN — SUCRALFATE 1 G: 1 TABLET ORAL at 11:00

## 2022-09-07 RX ADMIN — ROPINIROLE HYDROCHLORIDE 1 MG: 1 TABLET, FILM COATED ORAL at 21:07

## 2022-09-07 RX ADMIN — ANTI-FUNGAL POWDER MICONAZOLE NITRATE TALC FREE 1 EACH: 1.42 POWDER TOPICAL at 21:13

## 2022-09-07 RX ADMIN — Medication 5 MG: at 21:07

## 2022-09-07 RX ADMIN — BUDESONIDE 500 MCG: 0.5 SUSPENSION RESPIRATORY (INHALATION) at 06:41

## 2022-09-07 RX ADMIN — MONTELUKAST 10 MG: 10 TABLET, FILM COATED ORAL at 21:07

## 2022-09-07 RX ADMIN — HEPARIN 100 UNITS: 100 SYRINGE at 08:31

## 2022-09-07 RX ADMIN — SODIUM CHLORIDE 500 ML: 9 INJECTION, SOLUTION INTRAVENOUS at 10:27

## 2022-09-07 RX ADMIN — SUCRALFATE 1 G: 1 TABLET ORAL at 16:35

## 2022-09-07 RX ADMIN — INSULIN GLARGINE 13 UNITS: 100 INJECTION, SOLUTION SUBCUTANEOUS at 08:47

## 2022-09-07 RX ADMIN — CARBIDOPA AND LEVODOPA 2 TABLET: 25; 100 TABLET, EXTENDED RELEASE ORAL at 21:08

## 2022-09-07 RX ADMIN — INSULIN LISPRO 3 UNITS: 100 INJECTION, SOLUTION INTRAVENOUS; SUBCUTANEOUS at 11:03

## 2022-09-07 RX ADMIN — CARBIDOPA AND LEVODOPA 2 TABLET: 25; 100 TABLET, EXTENDED RELEASE ORAL at 14:07

## 2022-09-07 RX ADMIN — CARBIDOPA AND LEVODOPA 2 TABLET: 25; 100 TABLET, EXTENDED RELEASE ORAL at 08:44

## 2022-09-07 RX ADMIN — ROPINIROLE HYDROCHLORIDE 1 MG: 1 TABLET, FILM COATED ORAL at 14:07

## 2022-09-07 RX ADMIN — APIXABAN 5 MG: 5 TABLET, FILM COATED ORAL at 08:33

## 2022-09-07 RX ADMIN — BENZOIN RESIN: 1000 LIQUID TOPICAL at 21:14

## 2022-09-07 RX ADMIN — ANTI-FUNGAL POWDER MICONAZOLE NITRATE TALC FREE 1 EACH: 1.42 POWDER TOPICAL at 08:32

## 2022-09-07 ASSESSMENT — PAIN SCALES - WONG BAKER: WONGBAKER_NUMERICALRESPONSE: 0

## 2022-09-07 NOTE — CARE COORDINATION
9/7/2022 1105 CM Note:  CM for transition of care needs at d/c.  Pt's plan is to return to [address] and per [address] she is a long term resident and a bed hold, if there's a skilled need they will submit for precert but won't have to wait for [address]. Pt refusing to wear Bipap so facility won't need to provide one. Per Dr Rocha Mention will recheck BMP this afternoon and if improved pt will be discharged. [place] will need signed, RAPID COVID will be sent. Transportation is set up with [facility] for 17[facility]. [facility] liaison is aware of potential d/c and transportation time. Left VM for pt's son Shreya Cartwright. CM to follow.  Electronically signed by Tahmina Connors RN on 9/7/2022 at 11:29 AM

## 2022-09-07 NOTE — PROGRESS NOTES
Comprehensive Nutrition Assessment    Type and Reason for Visit:  Reassess    Nutrition Recommendations/Plan: Continue to monitor     Malnutrition Assessment:  Malnutrition Status: At risk for malnutrition (Comment) (09/01/22 1306)    Context:  Chronic Illness     Findings of the 6 clinical characteristics of malnutrition:  Energy Intake:  Mild decrease in energy intake (Comment)  Weight Loss:  Unable to assess     Body Fat Loss:  No significant body fat loss     Muscle Mass Loss:  No significant muscle mass loss    Fluid Accumulation:  No significant fluid accumulation     Strength:  Not Performed    Nutrition Assessment:    Pt admits w/ Acute Respiratory Failure w/ hypoxia & hypercapnia, Rt Pleural effusion w/ Hx DM, CA, Parkinsons, Aphasia. Noted 8/26 thoracentesis -900cc fluid. Pt tolerates dysphagia diet, w/ good PO intake > 75%. Continue to current diet regimen & monitor. Nutrition Related Findings:    A/ox4, abd obese/soft, +BS, refuses BiPAP,. renal labs elevated, Generalized non-pitting edema Wound Type:  (Rtv toe incision)       Current Nutrition Intake & Therapies:    Average Meal Intake: %  Average Supplements Intake: Unable to assess (no record of intake)  ADULT DIET; Dysphagia - Soft and Bite Sized; 4 carb choices (60 gm/meal); Low Sodium (2 gm); Mildly Thick (Nectar)  ADULT ORAL NUTRITION SUPPLEMENT; Breakfast, Lunch, Dinner; Other Oral Supplement; Gelatein 20    Anthropometric Measures:  Height: 5' (152.4 cm)  Ideal Body Weight (IBW): 100 lbs (45 kg)       Current Body Weight: 198 lb (89.8 kg) (9/6 bed), 198 % IBW. Weight Source: Bed Scale  Current BMI (kg/m2): 38.7  Usual Body Weight:  (per emr review wt ranges 202-255#.  wide wt range likely r/t fluid fluxs)                       BMI Categories: Obese Class 2 (BMI 35.0 -39.9)    Estimated Daily Nutrient Needs:  Energy Requirements Based On: Formula  Weight Used for Energy Requirements: Current  Energy (kcal/day): 6062-2345  Weight Used for Protein Requirements: Ideal  Protein (g/day): 60-70 (x1.3-1.5gm/kg( monitor renal status))  Method Used for Fluid Requirements: 1 ml/kcal  Fluid (ml/day): 6797-6208    Nutrition Diagnosis:   Increased nutrient needs related to cognitive or neurological impairment (parkinsons) as evidenced by wounds, swallow study results, lab values, poor dentition    Nutrition Interventions:   Food and/or Nutrient Delivery: Continue Current Diet, Continue Oral Nutrition Supplement  Nutrition Education/Counseling: No recommendation at this time  Coordination of Nutrition Care: Continue to monitor while inpatient       Goals:  Previous Goal Met: Progressing toward Goal(s)  Goals: PO intake 75% or greater       Nutrition Monitoring and Evaluation:   Behavioral-Environmental Outcomes: None Identified  Food/Nutrient Intake Outcomes: Food and Nutrient Intake, Supplement Intake  Physical Signs/Symptoms Outcomes: Biochemical Data, Chewing or Swallowing, GI Status, Fluid Status or Edema, Nutrition Focused Physical Findings, Skin, Weight    Discharge Planning:     Too soon to determine     Bevier BRAULIO Dumont, LD  Contact: [phone]

## 2022-09-07 NOTE — PROGRESS NOTES
Physical Therapy  Physical Therapy Treatment Note/Plan of Care    Room #:  3480/1163-84  Patient Name: Leah Gill  YOB: 1950  MRN: [id number]    Date of Service: 9/7/2022     Tentative placement recommendation: Subacute rehab or Long Term Acute Care  Equipment recommendation:  To be determined      Evaluating Physical Therapist: Keesha Smith PT, DPT #[id number]      Specific Provider Orders/Date/Referring Provider :     08/28/22 1430    PT eval and treat  Start:  08/28/22 1430,   End:  08/28/22 1430,   ONE TIME,   Standing Count:  1 Occurrences,   R         Lenda Ion, DO Acknowledge New     Admitting Diagnosis:   Atrial fibrillation with rapid ventricular response ([address] [I48.91]      Surgery: none  Visit Diagnoses         Codes    Atrial fibrillation with RVR ([address]     I48.91            Patient Active Problem List   Diagnosis    Depression with anxiety    Osteoporosis    Asthma    Hyperlipidemia    Mitral regurgitation    Obstructive sleep apnea syndrome    Psoriasis    Diabetes mellitus ([address]    Parkinson's disease ([address]    Primary hypertension    Microalbuminuria    Morbid obesity ([address]    RLS (restless legs syndrome)    Generalized weakness    Inability to walk    Hypothyroidism    Chest pain    Acute asthma exacerbation    Asthma exacerbation, mild    Paroxysmal atrial fibrillation (HCC)    Atrial fibrillation with RVR (HCC)    Acute on chronic congestive heart failure ([address]    Coronary artery disease involving native coronary artery of native heart without angina pectoris    Dysphagia    Hepatosplenomegaly    Acute decompensated heart failure (HCC)    Acute diastolic (congestive) heart failure (HCC)    Nonrheumatic tricuspid valve regurgitation    Tobacco abuse    Recurrent syncope    Septic shock ([address]    Seizure-like activity (HCC)    SHANTANU (acute kidney injury) ([address]    Pancytopenia ([address]    Thrombocytopenia ([address]    Encephalopathy    Acute respiratory failure with hypoxia ([address] Lactic acidosis    Delirium    Acute on chronic anemia    Pleural effusion, right    Acute respiratory failure with hypoxia and hypercapnia (HCC)    Acute confusion    Chronic diastolic (congestive) heart failure (HCC)    Macrocytosis    Other specified anemias    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[facility]    Puerperal sepsis with acute hypoxic respiratory failure without septic shock (HCC)    Pulmonary HTN (HCC)    Chronic anticoagulation    RVF (right ventricular failure) (HCC)    Metabolic alkalosis        ASSESSMENT of Current Deficits Patient exhibits decreased strength, balance, and endurance impairing functional mobility, transfers, gait , gait distance, and tolerance to activity. Progressed gait distance this treatment session with no loss of balance or knee buckling; patient with short step length, shuffling steps and slow pacing. Patient does fatigue quickly needing seated rest break due to impaired endurance. Patient needs continued PT to improve strength and endurance to tolerate and complete functional mobility with decreased assist required.         PHYSICAL THERAPY  PLAN OF CARE       Physical therapy plan of care is established based on physician order,  patient diagnosis and clinical assessment    Current Treatment Recommendations:    -Bed Mobility: Lower extremity exercises  and Trunk control activities   -Sitting Balance: Incorporate reaching activities to activate trunk muscles , Hands on support to maintain midline , Facilitate active trunk muscle engagement , Facilitate postural control in all planes , and Engage in core activities to allow for movement within base of support   -Standing Balance: Perform strengthening exercises in standing to promote motor control with or without upper extremity support , Instruct patient on adequate base of support to maintain balance, and Challenge balance utilizing reaching  activities beyond center of gravity    -Transfers: Provide instruction on proper hand and foot position for adequate transfer of weight onto lower extremities and use of gait device if needed, Cues for hand placement, technique and safety. Provide stabilization to prevent fall , Facilitate weight shift forward on to lower extremities and provide necessary stabilization of bilateral lower extremities , Support transfer of weight on to lower extremities, and Assist with extension of knees trunk and hip to accept weight transfer   -Gait: Gait training, Standing activities to improve: base of support, weight shift, weight bearing , Exercises to improve trunk control, Exercises to improve hip and knee control, Performance of protected weight bearing activities, and Activities to increase weight bearing   -Endurance: Utilize Supervised activities to increase level of endurance to allow for safe functional mobility including transfers and gait  and Use graduated activities to promote good breathing techniques and provide support and education to maximize respiratory function    PT long term treatment goals are located in below grid    Patient and or family understand(s) diagnosis, prognosis, and plan of care.     Frequency of treatments: Patient will be seen  dailyyy         Prior Level of Function: Patient ambulated with wheeled walker   Rehab Potential: good - for baseline    Past medical history:   Past Medical History:   Diagnosis Date    A-fib ([facility]     Acute on chronic congestive heart failure (HCC)     Anxiety     Asthma     CAD (coronary artery disease) 01/21/2016    Cancer ([facility]  breast ca 2006    right lumpectomy    Chronic kidney disease     nephrolithiasis    Depression     Diabetes mellitus ([facility]     H/O mammogram     Hx MRSA infection     toe infection january 2012    Hyperlipidemia     Hypertension     Lateral epicondylitis     SERENA on CPAP     Parkinson's disease ([facility]     Tubal ligation status      Past Surgical History:   Procedure Laterality Date    BREAST LUMPECTOMY      BREAST REDUCTION SURGERY      CARDIAC CATHETERIZATION  4/28/2014    Dr. Enrique Jacobo  01/11/2022    Dr. Miguelito Quevedo  7/29/15    CT GUIDED CHEST TUBE  7/8/2022    CT GUIDED CHEST TUBE 7/8/2022 Collins Cabrera MD [facility]    ENDOSCOPY, COLON, DIAGNOSTIC  7/19/15    GALLBLADDER SURGERY      LUMBAR LAMINECTOMY      TOE AMPUTATION      TONSILLECTOMY      UPPER GASTROINTESTINAL ENDOSCOPY      UPPER GASTROINTESTINAL ENDOSCOPY N/A 7/19/2022    EGD ESOPHAGOGASTRODUODENOSCOPY performed by Shaye Nicole MD at [address]:    Precautions: Up with assistance, falls, O2, and Afib, morbid obesity      Social history: Patient lives at [employer] owned: Katherine Dumont, Silviano Quarles, and [address] Mobility Inpatient   How much difficulty turning over in bed?: A Little  How much difficulty sitting down on / standing up from a chair with arms?: A Little  How much difficulty moving from lying on back to sitting on side of bed?: A Little  How much help from another person moving to and from a bed to a chair?: A Little  How much help from another person needed to walk in hospital room?: A Little  How much help from another person for climbing 3-5 steps with a railing?: A Lot  AM-PAC Inpatient Mobility Raw Score : 17  AM-PAC Inpatient T-Scale Score : 42.13  Mobility Inpatient CMS 0-100% Score: 50.57  Mobility Inpatient CMS G-Code Modifier : CK    Nursing cleared patient for PT treatment. OBJECTIVE;   Initial Evaluation  Date: 8/29/2022 Treatment Date:  9/7/2022     Short Term/ Long Term   Goals   Was pt agreeable to Eval/treatment? Yes Yes  To be met in 3 days   Pain level   0 /10   0/10    Bed Mobility    Rolling: Moderate assist of 1    Supine to sit: Moderate assist of 1    Sit to supine:  Moderate assist of 1    Scooting: Minimal assist of 1   Rolling: Not assessed patient in chair   Supine to sit: Not assessed patient in chair   Sit to supine: Not assessed patient in chair   Scooting: Not assessed patient in chair    Rolling: Independent    Supine to sit: Independent    Sit to supine: Independent    Scooting: Independent     Transfers Sit to stand: Minimal assist of 1  Sit to stand: Supervision    Sit to stand: Independent    Ambulation     3-5 feet using  wheeled walker with Minimal assist of 1   for walker control, walker approximation, balance, and safety 2 x 50 feet using  wheeled walker with Minimal assist of 1   cues for upright posture, increased step length, safety, and pacing       > 75 feet using  wheeled walker with Modified Independent    Stair negotiation: ascended and descended   Not assessed  not assessed        ROM Within functional limits    Increase range of motion 10% of affected joints    Strength BUE:  refer to OT eval  RLE:  4-/5  LLE:  4-/5  Increase strength in affected mm groups by 1/3 grade   Balance Sitting EOB:  fair +  Dynamic Standing:  fair  Sitting EOB: good   Dynamic Standing: fair with wheeled walker   Sitting EOB:  good   Dynamic Standing: fair +     Patient is Alert & Oriented x person, place, time, and situation and follows directions    Sensation:  Patient  denies numbness/tingling   Edema:  no   Endurance: fair      Vitals: room air   Blood Pressure at rest  Blood Pressure during session    Heart Rate at rest Heart Rate during session   SPO2 at rest %  SPO2 during session 95%     Patient education  Patient educated on role of Physical Therapy, risks of immobility, safety and plan of care, energy conservation,  importance of mobility while in hospital , purse lip breathing, and safety      Patient response to education:   Pt verbalized understanding Pt demonstrated skill Pt requires further education in this area   Yes Partial Yes      Treatment:  Patient practiced and was instructed/facilitated in the following treatment:patient sitting in a chair at start of tx session. Pt performed seated exercises.  Pt stood, ambulated in the hallway, needed a seated rest period in the waiting room, and ambulated back to her room. Therapeutic Exercises:  ankle pumps, hip abduction/adduction, long arc quad, and seated marching x 20 reps. AROM    At end of session, patient in chair with     call light and phone within reach,  all lines and tubes intact, nursing notified. Patient would benefit from continued skilled Physical Therapy to improve functional independence and quality of life. Patient's/ family goals   rehab    Time in 08:40  Time out 09:04    Total Treatment Time  24 minutes    CPT codes:  Therapeutic activities (79001)   13 minutes  1 unit(s)  Therapeutic exercises (21297)   11 minutes  1 unit(s)    Maynor Manjarrez  [facility]  LIC # [id number]

## 2022-09-07 NOTE — PROGRESS NOTES
[facility] Progress Note    Admitting Date and Time: 8/24/2022 11:55 AM  Admit Dx: Atrial fibrillation with rapid ventricular response ([address] [I48.91]    Subjective:    Pt feels much better than before, feels breathing is improved, cough is improved. Per RN: nothing to report    ROS: denies fever, chills, cp, sob, n/v, HA unless stated above.       [START ON 9/8/2022] acetaZOLAMIDE  125 mg Oral Daily    bumetanide  0.5 mg Oral BID    predniSONE  30 mg Oral Daily    insulin glargine  13 Units SubCUTAneous BID    metoprolol succinate  75 mg Oral BID    digoxin  125 mcg Oral Daily    sodium chloride flush  5-40 mL IntraVENous 2 times per day    heparin flush  1 mL IntraVENous 2 times per day    benzoin compound   Topical Nightly    insulin lispro  0-8 Units SubCUTAneous TID WC    insulin lispro  0-4 Units SubCUTAneous Nightly    insulin lispro  3 Units SubCUTAneous TID WC    aspirin EC  81 mg Oral Daily    apixaban  5 mg Oral BID    rOPINIRole  1 mg Oral TID    sucralfate  1 g Oral 4x Daily    pantoprazole  40 mg Oral QAM    montelukast  10 mg Oral Nightly    miconazole  1 each Topical BID    ipratropium-albuterol  1 vial Inhalation 4x Daily    carbidopa-levodopa  2 tablet Oral TID    budesonide  0.5 mg Nebulization BID    atorvastatin  20 mg Oral Nightly    sodium chloride flush  5-40 mL IntraVENous 2 times per day    melatonin  5 mg Oral Nightly     ondansetron, 4 mg, Q6H PRN  docusate sodium, 100 mg, BID PRN  benzonatate, 100 mg, TID PRN  sodium chloride flush, 5-40 mL, PRN  sodium chloride, , PRN  heparin flush, 1 mL, PRN  mirtazapine, 15 mg, QHS PRN  LORazepam, 0.5 mg, Q12H PRN  glucose, 4 tablet, PRN  dextrose bolus, 125 mL, PRN   Or  dextrose bolus, 250 mL, PRN  glucagon (rDNA), 1 mg, PRN  dextrose, , Continuous PRN  polyethylene glycol, 17 g, Daily PRN  acetaminophen, 650 mg, Q6H PRN   Or  acetaminophen, 650 mg, Q6H PRN       Objective:    BP (!) 119/48   Pulse 84   Temp 97.1 °F (36.2 °C) (Infrared)   Resp 20   Ht 5' (1.524 m)   Wt 198 lb 8 oz (90 kg)   SpO2 97%   BMI 38.77 kg/m²     General Appearance: alert and oriented to person, place and time and in no acute distress, morbidly obese  Skin: warm and dry  Head: normocephalic and atraumatic  Eyes: pupils equal, round, and reactive to light, extraocular eye movements intact, conjunctivae normal  Neck: neck supple and non tender without mass   Pulmonary/Chest: clear to auscultation bilaterally- no wheezes, rales or rhonchi, normal air movement, no respiratory distress on RA  Cardiovascular: normal rate, normal S1 and S2 and no carotid bruits  Abdomen: soft, non-tender, non-distended, normal bowel sounds, no masses or organomegaly  Extremities: no cyanosis, no clubbing and trace pitting edema b/l LE  Neurologic: no cranial nerve deficit and speech normal      Recent Labs     09/05/22  1450 09/07/22  0845 09/07/22  1455    141 137   K 4.4 3.7 4.3   CL 91* 96* 94*   CO2 37* 36* 30*   BUN 40* 44* 45*   CREATININE 1.2* 1.3* 1.3*   GLUCOSE 234* 131* 255*   CALCIUM 8.8 9.3 8.8       Recent Labs     09/05/22  1450 09/07/22  0845   WBC 10.6 9.9   RBC 3.96 4.18   HGB 10.9* 11.6   HCT 36.0 38.6   MCV 90.9 92.3   MCH 27.5 27.8   MCHC 30.3* 30.1*   RDW 15.3* 15.4*    203   MPV 10.4 10.3       Radiology:     XR CHEST PORTABLE   Final Result   Elevation of the right hemidiaphragm with improving but persistent   atelectasis/infiltrates and pleural effusion in the right lung base. XR CHEST PORTABLE   Final Result   Stable small to moderate right pleural effusion. XR CHEST PORTABLE   Final Result   1. Small right pleural effusion. 2.  No other significant interval change. IR GUIDED THORACENTESIS PLEURAL   Final Result   Successful ultrasound guided thoracentesis. XR CHEST 1 VIEW   Final Result   1. There is no right pneumothorax status post right thoracentesis   2. Right lung base atelectasis   3.  The left lung is clear. XR CHEST PORTABLE   Final Result   1. Cardiomegaly with findings of CHF   2. Moderate size right pleural effusion   3. Trace left pleural effusion               Assessment:    Principal Problem:    Acute respiratory failure with hypoxia and hypercapnia ([facility])  Active Problems:    Acute decompensated heart failure (HCC)    Encephalopathy    Pleural effusion, right    Acute confusion    Chronic diastolic (congestive) heart failure (HCC)    Macrocytosis    Other specified anemias    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[facility]    Puerperal sepsis with acute hypoxic respiratory failure without septic shock ([facility])    Pulmonary HTN (HCC)    Chronic anticoagulation    RVF (right ventricular failure) ([facility])    Metabolic alkalosis    Diabetes mellitus (HCC)    Paroxysmal atrial fibrillation (HCC)    Atrial fibrillation with RVR ([facility])    Coronary artery disease involving native coronary artery of native heart without angina pectoris  Resolved Problems:    * No resolved hospital problems. *    Plan:  1. Acute on chronic  Respiratory failure with hypoxia and hypercapnia   -Oxygen weaned off, on RA  States baseline 3 L at nursing home?   -Repeat CXR 09/02 showed stable effusion  - Pulm consulted yesterday, advised to stop steroids. Started weaning process today. - Likely discharge tomorrow     2. SHANTANU  - Cr 1.0 > 1.1 > 1.2 > 1.3 today.   -Possibly due to over diuresis, I/Os not documented however RN states has been urinating a significant amount  -Will decrease bumex from 1mg BID to 0.5mg BID and stop diamox. Also gave 500cc bolus today and repeat BMP in afternoon however no improvement so will keep patient overnight. If creatinine improves tomorrow can discharge. 3.  Atrial fibrillation   - cardiology following PRN - appreciate recs   - switched to Toprol XL 50 mg bid 8/29, increased dose to 75 mg po bid 8/31   - continue eliquis, digoxin  - continue Bumex and Diamox - I/O not accurately recorded.

## 2022-09-07 NOTE — DISCHARGE INSTR - COC
Continuity of Care Form    Patient Name: Bradley Nunez   :    MRN:  [id number]    Admit date:  2022  Discharge date:  22    Code Status Order: Full Code   Advance Directives:     Admitting Physician:  Germán Myers MD  PCP: Gisell Crum DO    Discharging Nurse: [facility] Unit/Room#: 8282/5440-78  Discharging Unit Phone Number: [phone]    Emergency Contact:   Extended Emergency Contact Information  Primary Emergency Contact: Tom Thomas   [address] Phone: [phone]  Mobile Phone: [phone]  Relation: Child   needed? No  Secondary Emergency Contact: Scooter Doe  Mobile Phone: [phone]  Relation: Child   needed?  No    Past Surgical History:  Past Surgical History:   Procedure Laterality Date    BREAST LUMPECTOMY      BREAST REDUCTION SURGERY      CARDIAC CATHETERIZATION  2014    Dr. Tiffany Leach  2022    Dr. Ebonie Milan  7/29/15    CT GUIDED CHEST TUBE  2022    CT GUIDED CHEST TUBE 2022 Clifton Sicard, MD [facility]    ENDOSCOPY, COLON, DIAGNOSTIC  7/19/15    GALLBLADDER SURGERY      LUMBAR LAMINECTOMY      TOE AMPUTATION      TONSILLECTOMY      UPPER GASTROINTESTINAL ENDOSCOPY      UPPER GASTROINTESTINAL ENDOSCOPY N/A 2022    EGD ESOPHAGOGASTRODUODENOSCOPY performed by Semaj Avila MD at [address] [place] [address]       Immunization History:   Immunization History   Administered Date(s) Administered    COVID-19, PFIZER GRAY top, DO NOT Dilute, (age 15 y+), IM, 30 mcg/0.3 mL 2022    Influenza Vaccine, unspecified formulation 10/15/2014    Influenza Virus Vaccine 10/31/2008, 10/05/2011    Influenza, High Dose (Fluzone 65 yrs and older) 2015, 2016, 2017, 2019    Influenza, Triv, inactivated, subunit, adjuvanted, IM (Fluad 65 yrs and older) 2019    Pneumococcal Conjugate 13-valent (Jillene Dulce) 2016 Pneumococcal Polysaccharide ([id number]) 12/21/2012, 01/29/2018    Td, unspecified formulation 03/11/2014    Tdap (Boostrix, Adacel) 09/30/2015    Zoster Live (Zostavax) 06/25/2013    Zoster Recombinant (Shingrix) 12/10/2019       Active Problems:  Patient Active Problem List   Diagnosis Code    Depression with anxiety F41.8    Osteoporosis M81.0    Asthma J45.909    Hyperlipidemia E78.5    Mitral regurgitation I34.0    Obstructive sleep apnea syndrome G47.33    Psoriasis L40.9    Diabetes mellitus (HCC) E11.9    Parkinson's disease ([facility] G20    Primary hypertension I10    Microalbuminuria R80.9    Morbid obesity ([facility] E66.01    RLS (restless legs syndrome) G25.81    Generalized weakness R53.1    Inability to walk R26.2    Hypothyroidism E03.9    Chest pain R07.9    Acute asthma exacerbation J45.901    Asthma exacerbation, mild J45.901    Paroxysmal atrial fibrillation ([facility]) I48.0    Atrial fibrillation with RVR ([facility]) I48.91    Acute on chronic congestive heart failure ([facility]) I50.9    Coronary artery disease involving native coronary artery of native heart without angina pectoris I25.10    Dysphagia R13.10    Hepatosplenomegaly R16.2    Acute decompensated heart failure (HCC) G59.6    Acute diastolic (congestive) heart failure ([facility]) I50.31    Nonrheumatic tricuspid valve regurgitation I36.1    Tobacco abuse Z72.0    Recurrent syncope R55    Septic shock ([facility]) A41.9, R65.21    Seizure-like activity ([facility]) R56.9    SHANTANU (acute kidney injury) ([facility] N17.9    Pancytopenia (HCC) D61.818    Thrombocytopenia ([facility]) D69.6    Encephalopathy G93.40    Acute respiratory failure with hypoxia ([facility]) J96.01    Lactic acidosis E87.2    Delirium R41.0    Acute on chronic anemia D64.9    Pleural effusion, right J90    Acute respiratory failure with hypoxia and hypercapnia ([facility]) J96.01, J96.02    Acute confusion R41.0    Chronic diastolic (congestive) heart failure (HCC) I50.32    Macrocytosis D75.89    Other specified anemias D64.89 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[facility]) E11.22, N18.30    Puerperal sepsis with acute hypoxic respiratory failure without septic shock ([facility]) O85, R65.20, J96.01    Pulmonary HTN ([facility]) I27.20    Chronic anticoagulation Z79.01    RVF (right ventricular failure) ([facility]) I01.195    Metabolic alkalosis T14.5       Isolation/Infection:   Isolation            No Isolation          Patient Infection Status       Infection Onset Added Last Indicated Last Indicated By Review Planned Expiration Resolved Resolved By    None active    Resolved    COVID-19 (Rule Out) 08/24/22 08/24/22 08/25/22 Respiratory Panel, Molecular, with COVID-19 (Restricted: peds pts or suitable admitted adults) (Ordered)   08/25/22 Rule-Out Test Resulted    COVID-19 (Rule Out) 07/16/22 07/16/22 07/16/22 Respiratory Panel, Molecular, with COVID-19 (Restricted: peds pts or suitable admitted adults) (Ordered)   07/16/22 Rule-Out Test Resulted    COVID-19 (Rule Out) 07/05/22 07/05/22 07/06/22 Respiratory Panel, Molecular, with COVID-19 (Restricted: peds pts or suitable admitted adults) (Ordered)   07/06/22 Rule-Out Test Resulted    COVID-19 (Rule Out) 05/11/22 05/11/22 05/11/22 COVID-19 & Influenza Combo (Ordered)   05/11/22 Rule-Out Test Resulted    COVID-19 (Rule Out) 03/17/22 03/17/22 03/17/22 Respiratory Panel, Molecular, with COVID-19 (Restricted: peds pts or suitable admitted adults) (Ordered)   03/17/22 Rule-Out Test Resulted    COVID-19 (Rule Out) 03/16/22 03/16/22 03/16/22 COVID-19, Rapid (Ordered)   03/16/22 Rule-Out Test Resulted    C-diff Rule Out 01/10/22 01/10/22 01/10/22 C. difficile toxin Molecular (Ordered)   03/17/22 [id number] [place] [address] Road  Nickolas Rausch RN 1/11/22 1300     COVID-19 (Rule Out) 01/09/22 01/09/22 01/09/22 COVID-19, Rapid (Ordered)   01/09/22 Rule-Out Test Resulted    MRSA 05/19/21 05/21/21 05/19/21 Culture, Wound   10/25/21 Iron Jacobo RN    COVID-19 (Rule Out) 02/04/21 02/04/21 02/04/21 COVID-19 (Ordered)   02/04/21 Rule-Out Test Resulted            Nurse Assessment:  Last Vital Signs: /65   Pulse 78   Temp 97.2 °F (36.2 °C) (Infrared)   Resp 18   Ht 5' (1.524 m)   Wt 198 lb 8 oz (90 kg)   SpO2 92%   BMI 38.77 kg/m²     Last documented pain score (0-10 scale): Pain Level: 0  Last Weight:   Wt Readings from Last 1 Encounters:   09/06/22 198 lb 8 oz (90 kg)     Mental Status:  oriented and alert    IV Access:  - None    Nursing Mobility/ADLs:  Walking   Assisted  Transfer  Assisted  Bathing  Assisted  Dressing  Assisted  Toileting  Independent  Feeding  Independent  Med Admin  Assisted  Med Delivery   whole and prefers mixed with applesauce    Wound Care Documentation and Therapy:  Incision Toe (Comment  which one) Right (Active)   Wound Image   07/12/22 0945   Dressing Status Dry; Intact 08/31/22 1953   Dressing/Treatment Open to air 09/06/22 2015   Closure Sutures 07/18/22 0800   Margins Approximated 07/18/22 0800   Drainage Amount None 09/06/22 2015   Odor None 07/18/22 0800   Nicole-incision Assessment Ecchymosis 07/18/22 0800   Number of days:         Elimination:  Continence: Bowel: Yes  Bladder: Yes  Urinary Catheter: None   Colostomy/Ileostomy/Ileal Conduit: No       Date of Last BM: ***    Intake/Output Summary (Last 24 hours) at 9/7/2022 1023  Last data filed at 9/7/2022 0857  Gross per 24 hour   Intake 480 ml   Output --   Net 480 ml     I/O last 3 completed shifts: In: 360 [P.O.:360]  Out: -     Safety Concerns: At Risk for Falls and Aspiration Risk    Impairments/Disabilities:      None    Nutrition Therapy:  Current Nutrition Therapy:   - Oral Diet:  Dysphagia 3 advanced    Routes of Feeding: Oral  Liquids: Nectar Thick Liquids  Daily Fluid Restriction: no  Last Modified Barium Swallow with Video (Video Swallowing Test): not done    Treatments at the Time of Hospital Discharge:   Respiratory Treatments: ***  Oxygen Therapy:  is not on home oxygen therapy.   Ventilator:    - No ventilator support    Rehab Therapies: Physical Therapy, Occupational Therapy, and Speech/Language Therapy  Weight Bearing Status/Restrictions: No weight bearing restrictions  Other Medical Equipment (for information only, NOT a DME order):  walker, bath bench, bedside commode, and hospital bed  Other Treatments: ***    Patient's personal belongings (please select all that are sent with patient):  ***    RN SIGNATURE:  Electronically signed by Johan Fuentes RN on 9/7/22 at 10:26 AM EDT    CASE MANAGEMENT/SOCIAL WORK SECTION    Inpatient Status Date: 8/24/2022    Readmission Risk Assessment Score:  Readmission Risk              Risk of Unplanned Readmission:  62           Discharging to Facility/ Agency   Name: [facility] [address]  [phone]  [phone]    Dialysis Facility (if applicable)   Name:  Address:  Dialysis Schedule:  Phone:  Fax:    / signature: Electronically signed by Eleno Hernandez RN on 9/7/22 at 11:14 AM EDT    PHYSICIAN SECTION    Prognosis: Good    Condition at Discharge: Stable    Rehab Potential (if transferring to Rehab): Good    Recommended Labs or Other Treatments After Discharge: PT/OT, BMP twice weekly for 2 weeks on Monday and Thursdays    Physician Certification: I certify the above information and transfer of Precious Resendiz  is necessary for the continuing treatment of the diagnosis listed and that she requires Intermediate Nursing Care for greater 30 days.      Update Admission H&P: No change in H&P    PHYSICIAN SIGNATURE:  Electronically signed by Margarita Buckley MD on 9/8/22 at 10:31 AM EDT

## 2022-09-07 NOTE — PLAN OF CARE
Problem: Nutrition Deficit:  Goal: Optimize nutritional status  Outcome: Progressing  Flowsheets (Taken 9/7/2022 1117)  Nutrient intake appropriate for improving, restoring, or maintaining nutritional needs:   Assess nutritional status and recommend course of action   Monitor oral intake, labs, and treatment plans   Recommend appropriate diets, oral nutritional supplements, and vitamin/mineral supplements

## 2022-09-08 VITALS
WEIGHT: 201 LBS | BODY MASS INDEX: 39.46 KG/M2 | HEIGHT: 60 IN | SYSTOLIC BLOOD PRESSURE: 128 MMHG | HEART RATE: 95 BPM | RESPIRATION RATE: 18 BRPM | OXYGEN SATURATION: 95 % | DIASTOLIC BLOOD PRESSURE: 58 MMHG | TEMPERATURE: 98.1 F

## 2022-09-08 LAB
ANION GAP SERPL CALCULATED.3IONS-SCNC: 9 MMOL/L (ref 7–16)
BUN BLDV-MCNC: 45 MG/DL (ref 6–23)
CALCIUM SERPL-MCNC: 9 MG/DL (ref 8.6–10.2)
CHLORIDE BLD-SCNC: 98 MMOL/L (ref 98–107)
CO2: 35 MMOL/L (ref 22–29)
CREAT SERPL-MCNC: 1.3 MG/DL (ref 0.5–1)
GFR AFRICAN AMERICAN: 49
GFR NON-AFRICAN AMERICAN: 40 ML/MIN/1.73
GLUCOSE BLD-MCNC: 123 MG/DL (ref 74–99)
METER GLUCOSE: 121 MG/DL (ref 74–99)
POTASSIUM SERPL-SCNC: 4 MMOL/L (ref 3.5–5)
SARS-COV-2, NAAT: NOT DETECTED
SODIUM BLD-SCNC: 142 MMOL/L (ref 132–146)

## 2022-09-08 PROCEDURE — 6370000000 HC RX 637 (ALT 250 FOR IP): Performed by: STUDENT IN AN ORGANIZED HEALTH CARE EDUCATION/TRAINING PROGRAM

## 2022-09-08 PROCEDURE — 80048 BASIC METABOLIC PNL TOTAL CA: CPT

## 2022-09-08 PROCEDURE — 6370000000 HC RX 637 (ALT 250 FOR IP): Performed by: FAMILY MEDICINE

## 2022-09-08 PROCEDURE — 94660 CPAP INITIATION&MGMT: CPT

## 2022-09-08 PROCEDURE — 87635 SARS-COV-2 COVID-19 AMP PRB: CPT

## 2022-09-08 PROCEDURE — 6370000000 HC RX 637 (ALT 250 FOR IP): Performed by: INTERNAL MEDICINE

## 2022-09-08 PROCEDURE — 94640 AIRWAY INHALATION TREATMENT: CPT

## 2022-09-08 PROCEDURE — 2580000003 HC RX 258: Performed by: FAMILY MEDICINE

## 2022-09-08 PROCEDURE — 6360000002 HC RX W HCPCS: Performed by: FAMILY MEDICINE

## 2022-09-08 PROCEDURE — 82962 GLUCOSE BLOOD TEST: CPT

## 2022-09-08 PROCEDURE — 99239 HOSP IP/OBS DSCHRG MGMT >30: CPT | Performed by: STUDENT IN AN ORGANIZED HEALTH CARE EDUCATION/TRAINING PROGRAM

## 2022-09-08 PROCEDURE — 36415 COLL VENOUS BLD VENIPUNCTURE: CPT

## 2022-09-08 PROCEDURE — 36592 COLLECT BLOOD FROM PICC: CPT

## 2022-09-08 RX ORDER — PSEUDOEPHEDRINE HCL 30 MG
100 TABLET ORAL 2 TIMES DAILY PRN
DISCHARGE
Start: 2022-09-08

## 2022-09-08 RX ORDER — INSULIN GLARGINE 100 [IU]/ML
13 INJECTION, SOLUTION SUBCUTANEOUS 2 TIMES DAILY
Qty: 10 ML | Refills: 0 | DISCHARGE
Start: 2022-09-08

## 2022-09-08 RX ORDER — ACETAZOLAMIDE 250 MG/1
125 TABLET ORAL DAILY
Qty: 2 TABLET | Refills: 0 | DISCHARGE
Start: 2022-09-08 | End: 2022-10-05 | Stop reason: ALTCHOICE

## 2022-09-08 RX ORDER — METOPROLOL SUCCINATE 25 MG/1
75 TABLET, EXTENDED RELEASE ORAL 2 TIMES DAILY
Qty: 180 TABLET | Refills: 0 | Status: ON HOLD | DISCHARGE
Start: 2022-09-08 | End: 2022-10-10 | Stop reason: HOSPADM

## 2022-09-08 RX ORDER — PREDNISONE 20 MG/1
TABLET ORAL
Qty: 9 TABLET | Refills: 0 | DISCHARGE
Start: 2022-09-08 | End: 2022-09-18

## 2022-09-08 RX ORDER — POLYETHYLENE GLYCOL 3350 17 G/17G
17 POWDER, FOR SOLUTION ORAL DAILY PRN
Qty: 527 G | Refills: 0 | Status: ON HOLD | DISCHARGE
Start: 2022-09-08 | End: 2022-10-10

## 2022-09-08 RX ORDER — INSULIN LISPRO 100 [IU]/ML
3 INJECTION, SOLUTION INTRAVENOUS; SUBCUTANEOUS
Status: ON HOLD | DISCHARGE
Start: 2022-09-08 | End: 2022-10-10 | Stop reason: HOSPADM

## 2022-09-08 RX ORDER — BUMETANIDE 0.5 MG/1
0.5 TABLET ORAL 2 TIMES DAILY
Qty: 60 TABLET | Refills: 0 | DISCHARGE
Start: 2022-09-08 | End: 2022-10-05 | Stop reason: DRUGHIGH

## 2022-09-08 RX ORDER — DIGOXIN 125 MCG
125 TABLET ORAL DAILY
Qty: 30 TABLET | Refills: 0 | Status: ON HOLD | DISCHARGE
Start: 2022-09-09 | End: 2022-10-10 | Stop reason: HOSPADM

## 2022-09-08 RX ORDER — BENZONATATE 100 MG/1
100 CAPSULE ORAL 3 TIMES DAILY PRN
DISCHARGE
Start: 2022-09-08 | End: 2022-09-15

## 2022-09-08 RX ADMIN — IPRATROPIUM BROMIDE AND ALBUTEROL SULFATE 3 ML: .5; 2.5 SOLUTION RESPIRATORY (INHALATION) at 06:36

## 2022-09-08 RX ADMIN — DIGOXIN 125 MCG: 125 TABLET ORAL at 09:24

## 2022-09-08 RX ADMIN — APIXABAN 5 MG: 5 TABLET, FILM COATED ORAL at 09:24

## 2022-09-08 RX ADMIN — INSULIN LISPRO 3 UNITS: 100 INJECTION, SOLUTION INTRAVENOUS; SUBCUTANEOUS at 09:26

## 2022-09-08 RX ADMIN — Medication 10 ML: at 09:25

## 2022-09-08 RX ADMIN — ASPIRIN 81 MG: 81 TABLET, COATED ORAL at 09:24

## 2022-09-08 RX ADMIN — ANTI-FUNGAL POWDER MICONAZOLE NITRATE TALC FREE 1 EACH: 1.42 POWDER TOPICAL at 09:26

## 2022-09-08 RX ADMIN — ROPINIROLE HYDROCHLORIDE 1 MG: 1 TABLET, FILM COATED ORAL at 09:24

## 2022-09-08 RX ADMIN — METOPROLOL SUCCINATE 75 MG: 50 TABLET, EXTENDED RELEASE ORAL at 09:24

## 2022-09-08 RX ADMIN — INSULIN GLARGINE 13 UNITS: 100 INJECTION, SOLUTION SUBCUTANEOUS at 09:26

## 2022-09-08 RX ADMIN — BUDESONIDE 500 MCG: 0.5 SUSPENSION RESPIRATORY (INHALATION) at 06:36

## 2022-09-08 RX ADMIN — PREDNISONE 30 MG: 20 TABLET ORAL at 09:24

## 2022-09-08 RX ADMIN — SUCRALFATE 1 G: 1 TABLET ORAL at 09:24

## 2022-09-08 RX ADMIN — HEPARIN 100 UNITS: 100 SYRINGE at 09:25

## 2022-09-08 RX ADMIN — Medication 10 ML: at 09:27

## 2022-09-08 RX ADMIN — PANTOPRAZOLE SODIUM 40 MG: 40 TABLET, DELAYED RELEASE ORAL at 09:24

## 2022-09-08 RX ADMIN — BUMETANIDE 0.5 MG: 1 TABLET ORAL at 09:24

## 2022-09-08 ASSESSMENT — PAIN SCALES - WONG BAKER: WONGBAKER_NUMERICALRESPONSE: 0

## 2022-09-08 NOTE — PLAN OF CARE
Problem: Discharge Planning  Goal: Discharge to home or other facility with appropriate resources  Outcome: Adequate for Discharge  Flowsheets (Taken 9/8/2022 0924)  Discharge to home or other facility with appropriate resources: Identify barriers to discharge with patient and caregiver     Problem: ABCDS Injury Assessment  Goal: Absence of physical injury  Outcome: Adequate for Discharge     Problem: Safety - Adult  Goal: Free from fall injury  Outcome: Adequate for Discharge  Flowsheets (Taken 9/8/2022 0924)  Free From Fall Injury: Instruct family/caregiver on patient safety     Problem: Skin/Tissue Integrity  Goal: Absence of new skin breakdown  Description: 1. Monitor for areas of redness and/or skin breakdown  2. Assess vascular access sites hourly  3. Every 4-6 hours minimum:  Change oxygen saturation probe site  4. Every 4-6 hours:  If on nasal continuous positive airway pressure, respiratory therapy assess nares and determine need for appliance change or resting period.   Outcome: Adequate for Discharge     Problem: Chronic Conditions and Co-morbidities  Goal: Patient's chronic conditions and co-morbidity symptoms are monitored and maintained or improved  Outcome: Adequate for Discharge  Flowsheets (Taken 9/8/2022 0924)  Care Plan - Patient's Chronic Conditions and Co-Morbidity Symptoms are Monitored and Maintained or Improved: Monitor and assess patient's chronic conditions and comorbid symptoms for stability, deterioration, or improvement     Problem: Pain  Goal: Verbalizes/displays adequate comfort level or baseline comfort level  Outcome: Adequate for Discharge     Problem: Nutrition Deficit:  Goal: Optimize nutritional status  Outcome: Adequate for Discharge

## 2022-09-08 NOTE — CARE COORDINATION
9/8/2022 0940 CM Note:  CM for transition of care needs at d/c.  Pt's d/c was cancelled yesterday d/t lab work but is discharged today. Per Merle at [address] their and transport [facility] can pick her up at 11:00. DALLAS is signed, RAPID COVID was sent. CM spoke with  pt's daughter Ariel Conte and made her aware of d/c time. CM will follow. Electronically signed by Ashlyn Aguayo RN on 9/8/2022 at 9:54 AM     Addendum 9/8/2022 0940 Pt's Rapid Covid came back negative.   Electronically signed by Ashlyn Aguayo RN on 9/8/2022 at 10:31 AM

## 2022-09-08 NOTE — DISCHARGE SUMMARY
[id number] [facility] Physician Discharge Summary       Stevo Ramirez DO  [address] [address]  [phone]    Schedule an appointment as soon as possible for a visit in 1 week(s)        Activity level: as tolerated    Diet: ADULT DIET; Dysphagia - Soft and Bite Sized; 4 carb choices (60 gm/meal); Low Sodium (2 gm); Mildly Thick (Nectar)  ADULT ORAL NUTRITION SUPPLEMENT; Breakfast, Lunch, Dinner; Other Oral Supplement; Gelatein 20    Labs:BMP Mondays and Thursday for 2 weeks    Dispo:SNF (long term bed)    Condition at discharge: Stable       Patient ID:  Avril Thomas  [id number]  89 y.o.  1950    Admit date: 8/24/2022    Discharge date and time:  9/8/2022  10:47 AM    Admission Diagnoses: Principal Problem:    Acute respiratory failure with hypoxia and hypercapnia (HCC)  Active Problems:    Acute decompensated heart failure (HCC)    Encephalopathy    Pleural effusion, right    Acute confusion    Chronic diastolic (congestive) heart failure (HCC)    Macrocytosis    Other specified anemias    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[address]    Puerperal sepsis with acute hypoxic respiratory failure without septic shock (HCC)    Pulmonary HTN (HCC)    Chronic anticoagulation    RVF (right ventricular failure) (HCC)    Metabolic alkalosis    Diabetes mellitus (HCC)    Paroxysmal atrial fibrillation (HCC)    Atrial fibrillation with RVR ([address]    Coronary artery disease involving native coronary artery of native heart without angina pectoris  Resolved Problems:    * No resolved hospital problems.  *      Discharge Diagnoses: Principal Problem:    Acute respiratory failure with hypoxia and hypercapnia (HCC)  Active Problems:    Acute decompensated heart failure (HCC)    Encephalopathy    Pleural effusion, right    Acute confusion    Chronic diastolic (congestive) heart failure (HCC)    Macrocytosis    Other specified anemias    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[address] Puerperal sepsis with acute hypoxic respiratory failure without septic shock (HCC)    Pulmonary HTN (HCC)    Chronic anticoagulation    RVF (right ventricular failure) (HCC)    Metabolic alkalosis    Diabetes mellitus (HCC)    Paroxysmal atrial fibrillation (HCC)    Atrial fibrillation with RVR (HCC)    Coronary artery disease involving native coronary artery of native heart without angina pectoris  Resolved Problems:    * No resolved hospital problems. *      Consults:  IP CONSULT TO PULMONOLOGY  IP CONSULT TO CARDIOLOGY  IP CONSULT TO PULMONOLOGY    Procedures: L UE midline on 8/25/22    Hospital Course: Patient was admitted with acute respiratory failure and afib RVR initially. She had a prolonged hospital stay that is summarized below:    1. Acute on chronic  Respiratory failure with hypoxia and hypercapnia   -Oxygen weaned off, on RA  States baseline 3 L at nursing home?   -Repeat CXR 09/02 showed stable effusion  - Pulm consulted, advised to stop steroids. Started weaning process and will be discharged on a taper. 2.  SHANTANU  - Cr 1.0 > 1.1 > 1.2 > 1.3 today. Has remained stable at 1.3 past 2 days   -Possibly due to over diuresis, I/Os not documented however RN states has been urinating a significant amount  -Will decrease bumex from 1mg BID to 0.5mg BID and reduced diamox dose. Also gave 500cc bolus yesterday and trend twice weekly outpatient for 2 weeks. 3.  Atrial fibrillation   - cardiology following PRN - appreciate recs   - switched to Toprol XL 50 mg bid 8/29, increased dose to 75 mg po bid 8/31   - continue eliquis, digoxin  - continue Bumex and Diamox - I/O not accurately recorded. Reduced doses of diuretics due to SHANTANU - see above  Continue Telemetry monitoring- currently showing afib. Dig level 0.6     4.   T2DM   -continue Diabetic diet, Lantus 3 units of Humalog with meals and MDSS insulin scale, hypoglycemia protocol  - A1C 6.1%  -Lantus increased from 12 units BID to 13 units BID yesterday. Glucose improving, continue to monitor     5.   metabolic Encephalopathy  - RESOLVED      6. Metabolic alkalosis   - Unsure of cause, possible contraction alkalosis, chloride is also depleted. Also possible compensatory for chronic hypercapnic respiratory failure. Patient refused ABG. -Started diamox 8/31, today bicarb 30 anion gap 13. Reduced diamox dose. -Continue to monitor    Discharge Exam:  Vitals:    09/08/22 0020 09/08/22 0545 09/08/22 0637 09/08/22 0924   BP:  (!) 148/79  (!) 128/58   Pulse:  86 76 95   Resp:  18 16    Temp:  98.6 °F (37 °C)     TempSrc:       SpO2:  94% 94%    Weight: 201 lb (91.2 kg)      Height:           General Appearance: alert and oriented to person, place and time and in no acute distress, morbidly obese  Skin: warm and dry  Head: normocephalic and atraumatic  Eyes: pupils equal, round, and reactive to light, extraocular eye movements intact, conjunctivae normal  Neck: neck supple and non tender without mass   Pulmonary/Chest: clear to auscultation bilaterally- no wheezes, rales or rhonchi, normal air movement, no respiratory distress on RA  Cardiovascular: normal rate, normal S1 and S2 and no carotid bruits  Abdomen: soft, non-tender, non-distended, normal bowel sounds, no masses or organomegaly  Extremities: no cyanosis, no clubbing and trace pitting edema b/l LE  Neurologic: no cranial nerve deficit and speech normal    I/O last 3 completed shifts:   In: 8832 [P.O.:960; I.V.:500]  Out: 2200 [Urine:2200]  I/O this shift:  In: 240 [P.O.:240]  Out: -       LABS:  Recent Labs     09/07/22  0845 09/07/22  1455 09/08/22  0537    137 142   K 3.7 4.3 4.0   CL 96* 94* 98   CO2 36* 30* 35*   BUN 44* 45* 45*   CREATININE 1.3* 1.3* 1.3*   GLUCOSE 131* 255* 123*   CALCIUM 9.3 8.8 9.0       Recent Labs     09/05/22  1450 09/07/22  0845   WBC 10.6 9.9   RBC 3.96 4.18   HGB 10.9* 11.6   HCT 36.0 38.6   MCV 90.9 92.3   MCH 27.5 27.8   MCHC 30.3* 30.1*   RDW 15.3* 15.4*  203   MPV 10.4 10.3       No results for input(s): POCGLU in the last 72 hours. Imaging:   XR CHEST PORTABLE   Final Result   Elevation of the right hemidiaphragm with improving but persistent   atelectasis/infiltrates and pleural effusion in the right lung base. XR CHEST PORTABLE   Final Result   Stable small to moderate right pleural effusion. XR CHEST PORTABLE   Final Result   1. Small right pleural effusion. 2.  No other significant interval change. IR GUIDED THORACENTESIS PLEURAL   Final Result   Successful ultrasound guided thoracentesis. XR CHEST 1 VIEW   Final Result   1. There is no right pneumothorax status post right thoracentesis   2. Right lung base atelectasis   3. The left lung is clear. XR CHEST PORTABLE   Final Result   1. Cardiomegaly with findings of CHF   2. Moderate size right pleural effusion   3.  Trace left pleural effusion             Patient Instructions:      Medication List        START taking these medications      acetaZOLAMIDE 250 MG tablet  Commonly known as: DIAMOX  Take 0.5 tablets by mouth daily for 4 days     benzonatate 100 MG capsule  Commonly known as: TESSALON  Take 1 capsule by mouth 3 times daily as needed for Cough     digoxin 125 MCG tablet  Commonly known as: LANOXIN  Take 1 tablet by mouth daily  Start taking on: September 9, 2022     docusate 100 MG Caps  Commonly known as: COLACE, DULCOLAX  Take 100 mg by mouth 2 times daily as needed for Constipation     insulin glargine 100 UNIT/ML injection vial  Commonly known as: LANTUS  Inject 13 Units into the skin 2 times daily  Replaces: insulin glargine-yfgn 100 UNIT/ML injection vial     insulin lispro 100 UNIT/ML Soln injection vial  Commonly known as: HUMALOG  Inject 3 Units into the skin 3 times daily (with meals)     metoprolol succinate 25 MG extended release tablet  Commonly known as: TOPROL XL  Take 3 tablets by mouth in the morning and at bedtime polyethylene glycol 17 g packet  Commonly known as: GLYCOLAX  Take 17 g by mouth daily as needed for Constipation     predniSONE 20 MG tablet  Commonly known as: DELTASONE  Take 1.5 tablets by mouth daily for 2 days, THEN 1 tablet daily for 4 days, THEN 0.5 tablets daily for 4 days. Start taking on: September 8, 2022            CHANGE how you take these medications      bumetanide 0.5 MG tablet  Commonly known as: BUMEX  Take 1 tablet by mouth 2 times daily  What changed:   medication strength  how much to take  when to take this            CONTINUE taking these medications      acetaminophen 325 MG tablet  Commonly known as: TYLENOL     apixaban 5 MG Tabs tablet  Commonly known as: ELIQUIS  Take 1 tablet by mouth in the morning and 1 tablet before bedtime. aspirin EC 81 MG EC tablet  Take 1 tablet by mouth daily     atorvastatin 20 MG tablet  Commonly known as: LIPITOR     benzoin compound solution     budesonide-formoterol 160-4.5 MCG/ACT Aero  Commonly known as: SYMBICORT     carbidopa-levodopa  MG per extended release tablet  Commonly known as: SINEMET CR  Take 2 tablets by mouth in the morning and 2 tablets at noon and 2 tablets in the evening. insulin aspart 100 UNIT/ML injection vial  Commonly known as: NOVOLOG     ipratropium-albuterol 0.5-2.5 (3) MG/3ML Soln nebulizer solution  Commonly known as: DUONEB     LORazepam 0.5 MG tablet  Commonly known as: ATIVAN     melatonin 5 MG Tabs tablet     miconazole 2 % powder  Commonly known as: MICOTIN     mirtazapine 15 MG tablet  Commonly known as: REMERON     montelukast 10 MG tablet  Commonly known as: SINGULAIR     pantoprazole 40 MG tablet  Commonly known as: PROTONIX     promethazine 25 MG tablet  Commonly known as: PHENERGAN     rOPINIRole 1 MG tablet  Commonly known as: REQUIP  Take 1 tablet by mouth in the morning and 1 tablet at noon and 1 tablet before bedtime.      sucralfate 1 GM tablet  Commonly known as: CARAFATE  Take 1 tablet by mouth in the morning and 1 tablet at noon and 1 tablet in the evening and 1 tablet before bedtime. STOP taking these medications      amiodarone 200 MG tablet  Commonly known as: CORDARONE     budesonide 0.5 MG/2ML nebulizer suspension  Commonly known as: PULMICORT     divalproex 250 MG DR tablet  Commonly known as: DEPAKOTE     ferrous sulfate 325 (65 Fe) MG tablet  Commonly known as: IRON 325     insulin glargine-yfgn 100 UNIT/ML injection vial  Commonly known as: SEMGLEE-YFGN  Replaced by: insulin glargine 100 UNIT/ML injection vial     metoprolol tartrate 25 MG tablet  Commonly known as: LOPRESSOR     OXYGEN     QUEtiapine 25 MG tablet  Commonly known as: SEROQUEL               Where to Get Your Medications        Information about where to get these medications is not yet available    Ask your nurse or doctor about these medications  acetaZOLAMIDE 250 MG tablet  benzonatate 100 MG capsule  bumetanide 0.5 MG tablet  digoxin 125 MCG tablet  docusate 100 MG Caps  insulin glargine 100 UNIT/ML injection vial  insulin lispro 100 UNIT/ML Soln injection vial  metoprolol succinate 25 MG extended release tablet  polyethylene glycol 17 g packet  predniSONE 20 MG tablet           Note that more than 30 minutes was spent in preparing discharge papers, discussing discharge with patient, medication review, etc.    Signed:  Electronically signed by Chao Richardson MD on 9/8/2022 at 10:47 AM    NOTE: This report was transcribed using voice recognition software. Every effort was made to ensure accuracy; however, inadvertent computerized transcription errors may be present.

## 2022-09-08 NOTE — PROGRESS NOTES
Date: 9/7/2022    Time: 11:06 PM    Patient Placed On BIPAP/CPAP/ Non-Invasive Ventilation?   No    If no must comment    Comments: patient refused to wear BIPAP at this time        Socorro Steen RCP

## 2022-09-23 NOTE — PLAN OF CARE
Problem: Pain:  Goal: Pain level will decrease  Description: Pain level will decrease  10/25/2021 1803 by Diana Reid RN  Outcome: Completed  10/25/2021 1604 by Don Whitehead RN  Outcome: Met This Shift  Goal: Control of acute pain  Description: Control of acute pain  10/25/2021 1803 by Diana Reid RN  Outcome: Completed  10/25/2021 1604 by Don Whitehead RN  Outcome: Met This Shift  Goal: Control of chronic pain  Description: Control of chronic pain  10/25/2021 1803 by Diana Reid RN  Outcome: Completed  10/25/2021 1604 by Don Whitehead RN  Outcome: Met This Shift     Problem: Falls - Risk of:  Goal: Will remain free from falls  Description: Will remain free from falls  Outcome: Completed  Goal: Absence of physical injury  Description: Absence of physical injury  Outcome: Completed     Problem: Skin Integrity:  Goal: Will show no infection signs and symptoms  Description: Will show no infection signs and symptoms  Outcome: Completed  Goal: Absence of new skin breakdown  Description: Absence of new skin breakdown  Outcome: Completed
Problem: Pain:  Goal: Pain level will decrease  Description: Pain level will decrease  Outcome: Met This Shift  Goal: Control of acute pain  Description: Control of acute pain  Outcome: Met This Shift  Goal: Control of chronic pain  Description: Control of chronic pain  Outcome: Met This Shift
22-Sep-2022 23:00

## 2022-10-03 ENCOUNTER — HOSPITAL ENCOUNTER (EMERGENCY)
Age: 72
Discharge: HOME | DRG: 871 | End: 2022-10-04
Attending: EMERGENCY MEDICINE
Payer: MEDICARE

## 2022-10-03 ENCOUNTER — APPOINTMENT (OUTPATIENT)
Dept: GENERAL RADIOLOGY | Age: 72
DRG: 871 | End: 2022-10-03
Payer: MEDICARE

## 2022-10-03 DIAGNOSIS — I50.9 ACUTE ON CHRONIC CONGESTIVE HEART FAILURE, UNSPECIFIED HEART FAILURE TYPE (HCC): ICD-10-CM

## 2022-10-03 DIAGNOSIS — R06.02 SHORTNESS OF BREATH: Primary | ICD-10-CM

## 2022-10-03 LAB
BASOPHILS ABSOLUTE: 0.02 E9/L (ref 0–0.2)
BASOPHILS RELATIVE PERCENT: 0.3 % (ref 0–2)
EOSINOPHILS ABSOLUTE: 0.06 E9/L (ref 0.05–0.5)
EOSINOPHILS RELATIVE PERCENT: 0.9 % (ref 0–6)
HCT VFR BLD CALC: 31.5 % (ref 34–48)
HEMOGLOBIN: 9.4 G/DL (ref 11.5–15.5)
IMMATURE GRANULOCYTES #: 0.08 E9/L
IMMATURE GRANULOCYTES %: 1.2 % (ref 0–5)
LYMPHOCYTES ABSOLUTE: 1.47 E9/L (ref 1.5–4)
LYMPHOCYTES RELATIVE PERCENT: 22.3 % (ref 20–42)
MCH RBC QN AUTO: 27.6 PG (ref 26–35)
MCHC RBC AUTO-ENTMCNC: 29.8 % (ref 32–34.5)
MCV RBC AUTO: 92.6 FL (ref 80–99.9)
MONOCYTES ABSOLUTE: 0.41 E9/L (ref 0.1–0.95)
MONOCYTES RELATIVE PERCENT: 6.2 % (ref 2–12)
NEUTROPHILS ABSOLUTE: 4.55 E9/L (ref 1.8–7.3)
NEUTROPHILS RELATIVE PERCENT: 69.1 % (ref 43–80)
PDW BLD-RTO: 15.6 FL (ref 11.5–15)
PLATELET # BLD: 170 E9/L (ref 130–450)
PMV BLD AUTO: 11.3 FL (ref 7–12)
RBC # BLD: 3.4 E12/L (ref 3.5–5.5)
WBC # BLD: 6.6 E9/L (ref 4.5–11.5)

## 2022-10-03 PROCEDURE — 93005 ELECTROCARDIOGRAM TRACING: CPT | Performed by: STUDENT IN AN ORGANIZED HEALTH CARE EDUCATION/TRAINING PROGRAM

## 2022-10-03 PROCEDURE — 99285 EMERGENCY DEPT VISIT HI MDM: CPT

## 2022-10-03 PROCEDURE — 36569 INSJ PICC 5 YR+ W/O IMAGING: CPT

## 2022-10-03 PROCEDURE — 36415 COLL VENOUS BLD VENIPUNCTURE: CPT

## 2022-10-03 PROCEDURE — 85025 COMPLETE CBC W/AUTO DIFF WBC: CPT

## 2022-10-03 PROCEDURE — 80048 BASIC METABOLIC PNL TOTAL CA: CPT

## 2022-10-03 PROCEDURE — 84484 ASSAY OF TROPONIN QUANT: CPT

## 2022-10-03 PROCEDURE — 71045 X-RAY EXAM CHEST 1 VIEW: CPT

## 2022-10-03 PROCEDURE — 96374 THER/PROPH/DIAG INJ IV PUSH: CPT

## 2022-10-03 ASSESSMENT — ENCOUNTER SYMPTOMS
COUGH: 1
NAUSEA: 0
BACK PAIN: 0
SINUS PRESSURE: 0
EYE DISCHARGE: 0
DIARRHEA: 0
VOMITING: 0
SHORTNESS OF BREATH: 1
ABDOMINAL DISTENTION: 0
SORE THROAT: 0
WHEEZING: 0
EYE PAIN: 0
EYE REDNESS: 0

## 2022-10-03 ASSESSMENT — LIFESTYLE VARIABLES
HOW OFTEN DO YOU HAVE A DRINK CONTAINING ALCOHOL: NEVER
HOW MANY STANDARD DRINKS CONTAINING ALCOHOL DO YOU HAVE ON A TYPICAL DAY: PATIENT DOES NOT DRINK

## 2022-10-03 ASSESSMENT — PAIN - FUNCTIONAL ASSESSMENT: PAIN_FUNCTIONAL_ASSESSMENT: NONE - DENIES PAIN

## 2022-10-04 VITALS
HEART RATE: 74 BPM | BODY MASS INDEX: 39.27 KG/M2 | OXYGEN SATURATION: 97 % | DIASTOLIC BLOOD PRESSURE: 56 MMHG | HEIGHT: 60 IN | WEIGHT: 200 LBS | RESPIRATION RATE: 18 BRPM | TEMPERATURE: 98 F | SYSTOLIC BLOOD PRESSURE: 108 MMHG

## 2022-10-04 LAB
ANION GAP SERPL CALCULATED.3IONS-SCNC: 7 MMOL/L (ref 7–16)
BUN BLDV-MCNC: 26 MG/DL (ref 6–23)
CALCIUM SERPL-MCNC: 8.6 MG/DL (ref 8.6–10.2)
CHLORIDE BLD-SCNC: 100 MMOL/L (ref 98–107)
CO2: 31 MMOL/L (ref 22–29)
CREAT SERPL-MCNC: 1.3 MG/DL (ref 0.5–1)
GFR AFRICAN AMERICAN: 49
GFR NON-AFRICAN AMERICAN: 40 ML/MIN/1.73
GLUCOSE BLD-MCNC: 359 MG/DL (ref 74–99)
POTASSIUM REFLEX MAGNESIUM: 3.8 MMOL/L (ref 3.5–5)
SODIUM BLD-SCNC: 138 MMOL/L (ref 132–146)
TROPONIN, HIGH SENSITIVITY: 52 NG/L (ref 0–9)
TROPONIN, HIGH SENSITIVITY: 52 NG/L (ref 0–9)

## 2022-10-04 PROCEDURE — 36415 COLL VENOUS BLD VENIPUNCTURE: CPT

## 2022-10-04 PROCEDURE — 84484 ASSAY OF TROPONIN QUANT: CPT

## 2022-10-04 PROCEDURE — 6360000002 HC RX W HCPCS: Performed by: STUDENT IN AN ORGANIZED HEALTH CARE EDUCATION/TRAINING PROGRAM

## 2022-10-04 RX ORDER — FUROSEMIDE 10 MG/ML
40 INJECTION INTRAMUSCULAR; INTRAVENOUS ONCE
Status: COMPLETED | OUTPATIENT
Start: 2022-10-04 | End: 2022-10-04

## 2022-10-04 RX ADMIN — FUROSEMIDE 40 MG: 10 INJECTION, SOLUTION INTRAMUSCULAR; INTRAVENOUS at 00:29

## 2022-10-04 NOTE — ED PROVIDER NOTES
The history is provided by the patient, medical records and the EMS personnel. 69-year-old female presents emergency department complaint shortness of breath been short of breath approximately the past 2 days. Elicits been started on new medication possibly Ambien she is unsure. She otherwise denies any chest pain fever chills denies any changes to bowel bladder habits no other acute complaints at this time. Was previously on oxygen however recently has not been needing it until the past 2 days has been on 3 L at a nursing facility. Patient presents complaint shortness of breath going on for the past 2 days moderate in severity no aggravating or alleviating factors no other acute complaints. Review of Systems   Constitutional:  Negative for chills and fever. HENT:  Negative for ear pain, sinus pressure and sore throat. Eyes:  Negative for pain, discharge and redness. Respiratory:  Positive for cough and shortness of breath. Negative for wheezing. Cardiovascular:  Negative for chest pain. Gastrointestinal:  Negative for abdominal distention, diarrhea, nausea and vomiting. Genitourinary:  Negative for dysuria and frequency. Musculoskeletal:  Negative for arthralgias and back pain. Skin:  Negative for rash and wound. Neurological:  Negative for weakness and headaches. Hematological:  Negative for adenopathy. Psychiatric/Behavioral:  Negative for agitation and behavioral problems. All other systems reviewed and are negative. Physical Exam  Vitals and nursing note reviewed. Constitutional:       General: She is not in acute distress. Appearance: Normal appearance. She is normal weight. She is not toxic-appearing. HENT:      Head: Normocephalic and atraumatic. Eyes:      Conjunctiva/sclera: Conjunctivae normal.   Cardiovascular:      Rate and Rhythm: Normal rate and regular rhythm. Pulses: Normal pulses. Heart sounds: Normal heart sounds.  No murmur heard.    No gallop. Pulmonary:      Effort: Pulmonary effort is normal. No respiratory distress. Breath sounds: Normal breath sounds. No wheezing or rales. Abdominal:      General: Abdomen is flat. Bowel sounds are normal. There is no distension. Palpations: Abdomen is soft. Tenderness: There is no abdominal tenderness. There is no guarding. Skin:     General: Skin is warm and dry. Capillary Refill: Capillary refill takes less than 2 seconds. Neurological:      General: No focal deficit present. Mental Status: She is alert and oriented to person, place, and time. Psychiatric:         Mood and Affect: Mood normal.         Thought Content: Thought content normal.        Procedures     MDM     Amount and/or Complexity of Data Reviewed  Clinical lab tests: reviewed  Tests in the radiology section of CPT®: reviewed  Tests in the medicine section of CPT®: reviewed  Decide to obtain previous medical records or to obtain history from someone other than the patient: yes         25-year-old female presents to the emergency department complaint shortness of breath. Does have pitting edema to lower extremities. Does have small bilateral pleural effusions. Has been on oxygen previously at her nursing home and has been restarted on the past several days. Was given Lasix concern of possible CHF on the patient. And she is saturating well on her 3 L of oxygen. Remaining laboratory work-up is reassuring. She is safe to be discharged home to follow-up with her family doctor. Patient safe for discharge from the emergency department. Did advise on return precautions to the emergency department. Did also advise follow-up with her primary care physician. Patient agreeable with the plan moving forward.     ED Course as of 10/04/22 0118   Mon Oct 03, 2022   1613 ATTENDING PROVIDER ATTESTATION:     I have personally performed and/or participated in the history, exam, medical decision making, and procedures and agree with all pertinent clinical information unless otherwise noted. I have also reviewed and agree with the past medical, family and social history unless otherwise noted. I have discussed this patient in detail with the resident, and provided the instruction and education regarding patient complaining of 2 to 3 days of increasing shortness of breath worsened with exertion with no chest pain or palpitations. No fevers. Has increased bilateral lower extremity edema. Has a history of congestive heart failure. No fevers or URI symptoms per the patient. No abdominal pain. .  My findings/plan: Patient sitting up in the bed resting comfortably no distress. Generally diminished breath sounds in the bases but no respiratory distress. Heart rate regular, abdomen soft and nontender. +2 general bilateral lower extremity edema with no unilateral swelling. No jaundice or icterus. She is awake and alert and oriented x3 and pleasantly conversant. [NC]   8750 EKG shows atrial fibrillation rate of 81 with some nonspecific ST-T changes. No acute ischemic findings. Normal axis, normal conduction otherwise. Otherwise agree with resident. [NC]   7590 Peripheral Line Placement Procedure Note    Indication: Poor peripheral access, lack of vascular access    Consent: The patient provided verbal consent for this procedure. Procedure: The patient was positioned appropriately and the skin over the left arm was prepped with chlorhexidine. A tourniquet was placed proximal to the vein's location and ultrasound was utilized to identify the vein. A 20 gauge angiocath was inserted into the vein with ultrasound guidance, with the catheter being visualized as it was advanced within the vein. Blood was obtained from the venous access site if needed and the site was secured with a leur lock and a tegaderm adhesive bandage. The patient tolerated the procedure well.     Complications: None    Procedure procedure. Procedure: The patient was positioned appropriately and the skin over the left arm was prepped with chlorhexidine. A tourniquet was placed proximal to the vein's location and ultrasound was utilized to identify the vein. A 20 gauge angiocath was inserted into the vein with ultrasound guidance, with the catheter being visualized as it was advanced within the vein. Blood was obtained from the venous access site if needed and the site was secured with a leur lock and a tegaderm adhesive bandage. The patient tolerated the procedure well. Complications: None    Procedure performed by Lynn White MD pgy-3 at 10/3/2022 at 11:16 PM   [CB]   2321 EKG: This EKG is signed by emergency department physician. Rate: 81  Rhythm: a fib  Interpretation: No acute ST elevation depression afib rhythm normal axis stable intervals  Comparison: no previous EKG available      [CB]      ED Course User Index  [CB] Krish Garrison MD  [NC] Phyllis Talbot DO         ED Course as of 10/04/22 0118   Mon Oct 03, 2022   2217 ATTENDING PROVIDER ATTESTATION:     I have personally performed and/or participated in the history, exam, medical decision making, and procedures and agree with all pertinent clinical information unless otherwise noted. I have also reviewed and agree with the past medical, family and social history unless otherwise noted. I have discussed this patient in detail with the resident, and provided the instruction and education regarding patient complaining of 2 to 3 days of increasing shortness of breath worsened with exertion with no chest pain or palpitations. No fevers. Has increased bilateral lower extremity edema. Has a history of congestive heart failure. No fevers or URI symptoms per the patient. No abdominal pain. .  My findings/plan: Patient sitting up in the bed resting comfortably no distress. Generally diminished breath sounds in the bases but no respiratory distress.   Heart rate regular, abdomen soft and nontender. +2 general bilateral lower extremity edema with no unilateral swelling. No jaundice or icterus. She is awake and alert and oriented x3 and pleasantly conversant. [NC]   0578 EKG shows atrial fibrillation rate of 81 with some nonspecific ST-T changes. No acute ischemic findings. Normal axis, normal conduction otherwise. Otherwise agree with resident. [NC]   4066 Peripheral Line Placement Procedure Note    Indication: Poor peripheral access, lack of vascular access    Consent: The patient provided verbal consent for this procedure. Procedure: The patient was positioned appropriately and the skin over the left arm was prepped with chlorhexidine. A tourniquet was placed proximal to the vein's location and ultrasound was utilized to identify the vein. A 20 gauge angiocath was inserted into the vein with ultrasound guidance, with the catheter being visualized as it was advanced within the vein. Blood was obtained from the venous access site if needed and the site was secured with a leur lock and a tegaderm adhesive bandage. The patient tolerated the procedure well. Complications: None    Procedure performed by Manisha Cuevas MD pgy-3 at 10/3/2022 at 11:16 PM   [CB]   0112 EKG: This EKG is signed by emergency department physician.     Rate: 81  Rhythm: a fib  Interpretation: No acute ST elevation depression afib rhythm normal axis stable intervals  Comparison: no previous EKG available      [CB]      ED Course User Index  [CB] Yrn Archibald MD  [NC] Sisi Nunez, DO       --------------------------------------------- PAST HISTORY ---------------------------------------------  Past Medical History:  has a past medical history of [facility] Acute on chronic congestive heart failure ([facility] Anxiety, Asthma, CAD (coronary artery disease), Cancer ([facility] Chronic kidney disease, Depression, Diabetes mellitus ([facility] H/O mammogram, Hx MRSA infection, 0.10 - 0.95 E9/L    Eosinophils Absolute 0.06 0.05 - 0.50 E9/L    Basophils Absolute 0.02 0.00 - 0.20 V4/Y   Basic Metabolic Panel w/ Reflex to MG   Result Value Ref Range    Sodium 138 132 - 146 mmol/L    Potassium reflex Magnesium 3.8 3.5 - 5.0 mmol/L    Chloride 100 98 - 107 mmol/L    CO2 31 (H) 22 - 29 mmol/L    Anion Gap 7 7 - 16 mmol/L    Glucose 359 (H) 74 - 99 mg/dL    BUN 26 (H) 6 - 23 mg/dL    Creatinine 1.3 (H) 0.5 - 1.0 mg/dL    GFR Non-African American 40 >=60 mL/min/1.73    GFR African American 49     Calcium 8.6 8.6 - 10.2 mg/dL   Troponin   Result Value Ref Range    Troponin, High Sensitivity 52 (H) 0 - 9 ng/L   Troponin   Result Value Ref Range    Troponin, High Sensitivity 52 (H) 0 - 9 ng/L   EKG 12 Lead   Result Value Ref Range    Ventricular Rate 81 BPM    Atrial Rate 500 BPM    QRS Duration 72 ms    Q-T Interval 316 ms    QTc Calculation (Bazett) 367 ms    R Axis 71 degrees    T Axis -142 degrees       Radiology:  XR CHEST PORTABLE   Final Result   Small bilateral pleural effusions with mild bibasilar subsegmental   atelectasis. The 2 cm well-circumscribed density in the right perihilar region is most   likely a confluence of pulmonary vessels. RECOMMENDATION:   I recommend a dedicated PA and left lateral chest x-ray if the patient's   condition allows. This could be performed on an outpatient basis.             ------------------------- NURSING NOTES AND VITALS REVIEWED ---------------------------  Date / Time Roomed:  10/3/2022  9:44 PM  ED Bed Assignment:  17/17    The nursing notes within the ED encounter and vital signs as below have been reviewed.    BP (!) 108/56   Pulse 74   Temp 98 °F (36.7 °C) (Oral)   Resp 18   Ht 5' (1.524 m)   Wt 200 lb (90.7 kg)   SpO2 97%   BMI 39.06 kg/m²   Oxygen Saturation Interpretation: on home 3 l       ------------------------------------------ PROGRESS NOTES ------------------------------------------  1:17 AM CODEY  I have spoken with the patient and discussed todays results, in addition to providing specific details for the plan of care and counseling regarding the diagnosis and prognosis. Their questions are answered at this time and they are agreeable with the plan. I discussed at length with them reasons for immediate return here for re evaluation. They will followup with their primary care physician by calling their office on Monday.      --------------------------------- ADDITIONAL PROVIDER NOTES ---------------------------------  At this time the patient is without objective evidence of an acute process requiring hospitalization or inpatient management. They have remained hemodynamically stable throughout their entire ED visit and are stable for discharge with outpatient follow-up. The plan has been discussed in detail and they are aware of the specific conditions for emergent return, as well as the importance of follow-up. New Prescriptions    No medications on file       Diagnosis:  1. Shortness of breath    2. Acute on chronic congestive heart failure, unspecified heart failure type ([facility]        Disposition:  Patient's disposition: Discharge to home  Patient's condition is stable.              Audi Yang MD  Resident  10/04/22 [id number]

## 2022-10-05 ENCOUNTER — HOSPITAL ENCOUNTER (INPATIENT)
Age: 72
LOS: 5 days | Discharge: NURSING, REHAB OR LONG-TERM CARE | DRG: 871 | End: 2022-10-10
Attending: EMERGENCY MEDICINE | Admitting: FAMILY MEDICINE
Payer: MEDICARE

## 2022-10-05 ENCOUNTER — TELEPHONE (OUTPATIENT)
Dept: OTHER | Facility: CLINIC | Age: 72
End: 2022-10-05

## 2022-10-05 ENCOUNTER — APPOINTMENT (OUTPATIENT)
Dept: GENERAL RADIOLOGY | Age: 72
DRG: 871 | End: 2022-10-05
Payer: MEDICARE

## 2022-10-05 DIAGNOSIS — I50.32 CHRONIC DIASTOLIC (CONGESTIVE) HEART FAILURE (HCC): ICD-10-CM

## 2022-10-05 DIAGNOSIS — F41.9 ANXIETY: ICD-10-CM

## 2022-10-05 DIAGNOSIS — A41.9 SEPTICEMIA (HCC): Primary | ICD-10-CM

## 2022-10-05 DIAGNOSIS — J96.01 ACUTE RESPIRATORY FAILURE WITH HYPOXIA AND HYPERCAPNIA (HCC): ICD-10-CM

## 2022-10-05 DIAGNOSIS — N39.0 URINARY TRACT INFECTION IN FEMALE: ICD-10-CM

## 2022-10-05 DIAGNOSIS — J96.02 ACUTE RESPIRATORY FAILURE WITH HYPOXIA AND HYPERCAPNIA (HCC): ICD-10-CM

## 2022-10-05 DIAGNOSIS — I50.31 ACUTE DIASTOLIC (CONGESTIVE) HEART FAILURE (HCC): ICD-10-CM

## 2022-10-05 LAB
AADO2: 108.1 MMHG
AADO2: 162.5 MMHG
ADENOVIRUS BY PCR: NOT DETECTED
ALBUMIN SERPL-MCNC: 3.7 G/DL (ref 3.5–5.2)
ALP BLD-CCNC: 92 U/L (ref 35–104)
ALT SERPL-CCNC: 5 U/L (ref 0–32)
ANION GAP SERPL CALCULATED.3IONS-SCNC: 10 MMOL/L (ref 7–16)
AST SERPL-CCNC: 7 U/L (ref 0–31)
B.E.: 4.1 MMOL/L (ref -3–3)
B.E.: 4.4 MMOL/L (ref -3–3)
B.E.: 7.4 MMOL/L (ref -3–3)
B.E.: 7.8 MMOL/L (ref -3–3)
BACTERIA: ABNORMAL /HPF
BASOPHILS ABSOLUTE: 0.04 E9/L (ref 0–0.2)
BASOPHILS RELATIVE PERCENT: 0.4 % (ref 0–2)
BETA-HYDROXYBUTYRATE: 0.1 MMOL/L (ref 0.02–0.27)
BILIRUB SERPL-MCNC: 0.7 MG/DL (ref 0–1.2)
BILIRUBIN URINE: NEGATIVE
BLOOD, URINE: ABNORMAL
BORDETELLA PARAPERTUSSIS BY PCR: NOT DETECTED
BORDETELLA PERTUSSIS BY PCR: NOT DETECTED
BUN BLDV-MCNC: 26 MG/DL (ref 6–23)
CALCIUM SERPL-MCNC: 9.1 MG/DL (ref 8.6–10.2)
CHLAMYDOPHILIA PNEUMONIAE BY PCR: NOT DETECTED
CHLORIDE BLD-SCNC: 94 MMOL/L (ref 98–107)
CLARITY: ABNORMAL
CO2: 34 MMOL/L (ref 22–29)
COHB: 0.2 % (ref 0–1.5)
COHB: 0.3 % (ref 0–1.5)
COLOR: YELLOW
COMMENT: ABNORMAL
CORONAVIRUS 229E BY PCR: NOT DETECTED
CORONAVIRUS HKU1 BY PCR: NOT DETECTED
CORONAVIRUS NL63 BY PCR: NOT DETECTED
CORONAVIRUS OC43 BY PCR: NOT DETECTED
CREAT SERPL-MCNC: 1.3 MG/DL (ref 0.5–1)
CRITICAL NOTIFICATION: YES
CRITICAL NOTIFICATION: YES
CRITICAL: ABNORMAL
CRITICAL: ABNORMAL
DATE ANALYZED: ABNORMAL
DATE ANALYZED: ABNORMAL
DATE OF COLLECTION: ABNORMAL
DATE OF COLLECTION: ABNORMAL
DEVICE: ABNORMAL
DEVICE: ABNORMAL
DIGOXIN LEVEL: 2.2 NG/ML (ref 0.8–2)
EKG ATRIAL RATE: 46 BPM
EKG ATRIAL RATE: 500 BPM
EKG Q-T INTERVAL: 308 MS
EKG Q-T INTERVAL: 316 MS
EKG QRS DURATION: 72 MS
EKG QRS DURATION: 82 MS
EKG QTC CALCULATION (BAZETT): 367 MS
EKG QTC CALCULATION (BAZETT): 368 MS
EKG R AXIS: 71 DEGREES
EKG R AXIS: 86 DEGREES
EKG T AXIS: -102 DEGREES
EKG T AXIS: -142 DEGREES
EKG VENTRICULAR RATE: 81 BPM
EKG VENTRICULAR RATE: 86 BPM
EOSINOPHILS ABSOLUTE: 0.02 E9/L (ref 0.05–0.5)
EOSINOPHILS RELATIVE PERCENT: 0.2 % (ref 0–6)
EPITHELIAL CELLS, UA: ABNORMAL /HPF
FIO2: 3
FIO2: 60
FIO2: 60 %
FIO2: 60 %
GFR AFRICAN AMERICAN: 49
GFR NON-AFRICAN AMERICAN: 40 ML/MIN/1.73
GLUCOSE BLD-MCNC: 309 MG/DL (ref 74–99)
GLUCOSE URINE: 500 MG/DL
HCO3: 32.6 MMOL/L (ref 22–26)
HCO3: 32.7 MMOL/L (ref 22–26)
HCO3: 33.6 MMOL/L (ref 22–26)
HCO3: 35.3 MMOL/L (ref 22–26)
HCT VFR BLD CALC: 37.4 % (ref 34–48)
HEMOGLOBIN: 10.9 G/DL (ref 11.5–15.5)
HHB: 1.3 % (ref 0–5)
HHB: 1.6 % (ref 0–5)
HUMAN METAPNEUMOVIRUS BY PCR: NOT DETECTED
HUMAN RHINOVIRUS/ENTEROVIRUS BY PCR: NOT DETECTED
IMMATURE GRANULOCYTES #: 0.11 E9/L
IMMATURE GRANULOCYTES %: 1.1 % (ref 0–5)
INFLUENZA A BY PCR: NOT DETECTED
INFLUENZA A BY PCR: NOT DETECTED
INFLUENZA B BY PCR: NOT DETECTED
INFLUENZA B BY PCR: NOT DETECTED
KETONES, URINE: NEGATIVE MG/DL
LAB: ABNORMAL
LAB: ABNORMAL
LACTIC ACID, SEPSIS: 2 MMOL/L (ref 0.5–1.9)
LACTIC ACID: 1.5 MMOL/L (ref 0.5–2.2)
LACTIC ACID: 1.6 MMOL/L (ref 0.5–2.2)
LEUKOCYTE ESTERASE, URINE: NEGATIVE
LYMPHOCYTES ABSOLUTE: 0.93 E9/L (ref 1.5–4)
LYMPHOCYTES RELATIVE PERCENT: 9.4 % (ref 20–42)
Lab: ABNORMAL
Lab: ABNORMAL
MCH RBC QN AUTO: 27.3 PG (ref 26–35)
MCHC RBC AUTO-ENTMCNC: 29.1 % (ref 32–34.5)
MCV RBC AUTO: 93.7 FL (ref 80–99.9)
METER GLUCOSE: 174 MG/DL (ref 74–99)
METER GLUCOSE: 210 MG/DL (ref 74–99)
METER GLUCOSE: 286 MG/DL (ref 74–99)
METHB: 0.4 % (ref 0–1.5)
METHB: 0.4 % (ref 0–1.5)
MODE: ABNORMAL
MONOCYTES ABSOLUTE: 0.64 E9/L (ref 0.1–0.95)
MONOCYTES RELATIVE PERCENT: 6.5 % (ref 2–12)
MYCOPLASMA PNEUMONIAE BY PCR: NOT DETECTED
NEUTROPHILS ABSOLUTE: 8.16 E9/L (ref 1.8–7.3)
NEUTROPHILS RELATIVE PERCENT: 82.4 % (ref 43–80)
NITRITE, URINE: POSITIVE
O2 CONTENT: 14.4 ML/DL
O2 CONTENT: 14.6 ML/DL
O2 SATURATION: 91.6 % (ref 92–98.5)
O2 SATURATION: 97.6 % (ref 92–98.5)
O2 SATURATION: 98.4 % (ref 92–98.5)
O2 SATURATION: 98.7 % (ref 92–98.5)
O2HB: 97.8 % (ref 94–97)
O2HB: 98 % (ref 94–97)
OPERATOR ID: 2220
OPERATOR ID: 301
PARAINFLUENZA VIRUS 1 BY PCR: NOT DETECTED
PARAINFLUENZA VIRUS 2 BY PCR: NOT DETECTED
PARAINFLUENZA VIRUS 3 BY PCR: NOT DETECTED
PARAINFLUENZA VIRUS 4 BY PCR: NOT DETECTED
PATIENT TEMP: 37 C
PATIENT TEMP: 37 C
PCO2 37: 71.8 MMHG (ref 35–45)
PCO2 37: 75.7 MMHG (ref 35–45)
PCO2: 47.7 MMHG (ref 35–45)
PCO2: 70.7 MMHG (ref 35–45)
PDW BLD-RTO: 15.5 FL (ref 11.5–15)
PEEP/CPAP: 6 CMH2O
PEEP/CPAP: 6 CMH2O
PFO2: 2.88 MMHG/%
PFO2: 4.2 MMHG/%
PH 37: 7.25 (ref 7.35–7.45)
PH 37: 7.27 (ref 7.35–7.45)
PH BLOOD GAS: 7.32 (ref 7.35–7.45)
PH BLOOD GAS: 7.45 (ref 7.35–7.45)
PH UA: 5.5 (ref 5–9)
PLATELET # BLD: 193 E9/L (ref 130–450)
PMV BLD AUTO: 11.4 FL (ref 7–12)
PO2 37: 116.3 MMHG (ref 60–80)
PO2 37: 75.5 MMHG (ref 60–80)
PO2: 172.5 MMHG (ref 75–100)
PO2: 252.2 MMHG (ref 75–100)
POC SOURCE: ABNORMAL
POC SOURCE: ABNORMAL
POSITIVE END EXP PRESS: 6 CMH2O
POTASSIUM REFLEX MAGNESIUM: 4.2 MMOL/L (ref 3.5–5)
PRESSURE SUPPORT: 12 CMH2O
PRO-BNP: ABNORMAL PG/ML (ref 0–125)
PROTEIN UA: 30 MG/DL
PS: 20 CMH20
PS: 20 CMH20
RBC # BLD: 3.99 E12/L (ref 3.5–5.5)
RBC UA: ABNORMAL /HPF (ref 0–2)
RESPIRATORY SYNCYTIAL VIRUS BY PCR: NOT DETECTED
RI(T): 0.43
RI(T): 0.94
SARS-COV-2, NAAT: NOT DETECTED
SARS-COV-2, PCR: NOT DETECTED
SODIUM BLD-SCNC: 138 MMOL/L (ref 132–146)
SOURCE, BLOOD GAS: ABNORMAL
SOURCE, BLOOD GAS: ABNORMAL
SPECIFIC GRAVITY UA: 1.02 (ref 1–1.03)
THB: 10.1 G/DL (ref 11.5–16.5)
THB: 10.2 G/DL (ref 11.5–16.5)
TIME ANALYZED: 1630
TIME ANALYZED: 1754
TOTAL PROTEIN: 6.7 G/DL (ref 6.4–8.3)
TROPONIN, HIGH SENSITIVITY: 43 NG/L (ref 0–9)
UROBILINOGEN, URINE: 0.2 E.U./DL
WBC # BLD: 9.9 E9/L (ref 4.5–11.5)
WBC UA: ABNORMAL /HPF (ref 0–5)

## 2022-10-05 PROCEDURE — 6370000000 HC RX 637 (ALT 250 FOR IP): Performed by: EMERGENCY MEDICINE

## 2022-10-05 PROCEDURE — 99285 EMERGENCY DEPT VISIT HI MDM: CPT

## 2022-10-05 PROCEDURE — 36415 COLL VENOUS BLD VENIPUNCTURE: CPT

## 2022-10-05 PROCEDURE — 2000000000 HC ICU R&B

## 2022-10-05 PROCEDURE — 0202U NFCT DS 22 TRGT SARS-COV-2: CPT

## 2022-10-05 PROCEDURE — 80053 COMPREHEN METABOLIC PANEL: CPT

## 2022-10-05 PROCEDURE — 83880 ASSAY OF NATRIURETIC PEPTIDE: CPT

## 2022-10-05 PROCEDURE — 71045 X-RAY EXAM CHEST 1 VIEW: CPT

## 2022-10-05 PROCEDURE — 87040 BLOOD CULTURE FOR BACTERIA: CPT

## 2022-10-05 PROCEDURE — 82010 KETONE BODYS QUAN: CPT

## 2022-10-05 PROCEDURE — 93005 ELECTROCARDIOGRAM TRACING: CPT | Performed by: EMERGENCY MEDICINE

## 2022-10-05 PROCEDURE — 2580000003 HC RX 258: Performed by: EMERGENCY MEDICINE

## 2022-10-05 PROCEDURE — 6370000000 HC RX 637 (ALT 250 FOR IP): Performed by: INTERNAL MEDICINE

## 2022-10-05 PROCEDURE — 2580000003 HC RX 258

## 2022-10-05 PROCEDURE — 6360000002 HC RX W HCPCS: Performed by: INTERNAL MEDICINE

## 2022-10-05 PROCEDURE — 82803 BLOOD GASES ANY COMBINATION: CPT

## 2022-10-05 PROCEDURE — 87186 SC STD MICRODIL/AGAR DIL: CPT

## 2022-10-05 PROCEDURE — 87502 INFLUENZA DNA AMP PROBE: CPT

## 2022-10-05 PROCEDURE — 82962 GLUCOSE BLOOD TEST: CPT

## 2022-10-05 PROCEDURE — 87088 URINE BACTERIA CULTURE: CPT

## 2022-10-05 PROCEDURE — 2580000003 HC RX 258: Performed by: INTERNAL MEDICINE

## 2022-10-05 PROCEDURE — 87077 CULTURE AEROBIC IDENTIFY: CPT

## 2022-10-05 PROCEDURE — 6360000002 HC RX W HCPCS

## 2022-10-05 PROCEDURE — 6370000000 HC RX 637 (ALT 250 FOR IP)

## 2022-10-05 PROCEDURE — 82805 BLOOD GASES W/O2 SATURATION: CPT

## 2022-10-05 PROCEDURE — 85025 COMPLETE CBC W/AUTO DIFF WBC: CPT

## 2022-10-05 PROCEDURE — 87635 SARS-COV-2 COVID-19 AMP PRB: CPT

## 2022-10-05 PROCEDURE — 80162 ASSAY OF DIGOXIN TOTAL: CPT

## 2022-10-05 PROCEDURE — 94660 CPAP INITIATION&MGMT: CPT

## 2022-10-05 PROCEDURE — 94640 AIRWAY INHALATION TREATMENT: CPT

## 2022-10-05 PROCEDURE — 96374 THER/PROPH/DIAG INJ IV PUSH: CPT

## 2022-10-05 PROCEDURE — 84484 ASSAY OF TROPONIN QUANT: CPT

## 2022-10-05 PROCEDURE — 2500000003 HC RX 250 WO HCPCS: Performed by: INTERNAL MEDICINE

## 2022-10-05 PROCEDURE — 51702 INSERT TEMP BLADDER CATH: CPT

## 2022-10-05 PROCEDURE — 83605 ASSAY OF LACTIC ACID: CPT

## 2022-10-05 PROCEDURE — 81001 URINALYSIS AUTO W/SCOPE: CPT

## 2022-10-05 RX ORDER — LORAZEPAM 0.5 MG/1
0.5 TABLET ORAL EVERY 12 HOURS PRN
Status: DISCONTINUED | OUTPATIENT
Start: 2022-10-05 | End: 2022-10-10

## 2022-10-05 RX ORDER — ASPIRIN 81 MG/1
81 TABLET, CHEWABLE ORAL DAILY
Status: DISCONTINUED | OUTPATIENT
Start: 2022-10-05 | End: 2022-10-09

## 2022-10-05 RX ORDER — ACETAMINOPHEN 650 MG/1
650 SUPPOSITORY RECTAL ONCE
Status: COMPLETED | OUTPATIENT
Start: 2022-10-05 | End: 2022-10-05

## 2022-10-05 RX ORDER — 0.9 % SODIUM CHLORIDE 0.9 %
1000 INTRAVENOUS SOLUTION INTRAVENOUS ONCE
Status: COMPLETED | OUTPATIENT
Start: 2022-10-05 | End: 2022-10-05

## 2022-10-05 RX ORDER — ATORVASTATIN CALCIUM 20 MG/1
20 TABLET, FILM COATED ORAL NIGHTLY
Status: DISCONTINUED | OUTPATIENT
Start: 2022-10-05 | End: 2022-10-10 | Stop reason: HOSPADM

## 2022-10-05 RX ORDER — BUDESONIDE 0.5 MG/2ML
0.5 INHALANT ORAL 2 TIMES DAILY
Status: DISCONTINUED | OUTPATIENT
Start: 2022-10-05 | End: 2022-10-10 | Stop reason: HOSPADM

## 2022-10-05 RX ORDER — INSULIN LISPRO 100 [IU]/ML
0-8 INJECTION, SOLUTION INTRAVENOUS; SUBCUTANEOUS EVERY 6 HOURS
Status: DISCONTINUED | OUTPATIENT
Start: 2022-10-05 | End: 2022-10-06

## 2022-10-05 RX ORDER — ACETAMINOPHEN 325 MG/1
650 TABLET ORAL EVERY 6 HOURS PRN
Status: DISCONTINUED | OUTPATIENT
Start: 2022-10-05 | End: 2022-10-10 | Stop reason: HOSPADM

## 2022-10-05 RX ORDER — INSULIN GLARGINE 100 [IU]/ML
13 INJECTION, SOLUTION SUBCUTANEOUS DAILY
Status: DISCONTINUED | OUTPATIENT
Start: 2022-10-05 | End: 2022-10-05

## 2022-10-05 RX ORDER — BUMETANIDE 1 MG/1
0.5 TABLET ORAL 2 TIMES DAILY
Status: ON HOLD | COMMUNITY
End: 2022-10-10 | Stop reason: HOSPADM

## 2022-10-05 RX ORDER — INSULIN GLARGINE 100 [IU]/ML
13 INJECTION, SOLUTION SUBCUTANEOUS 2 TIMES DAILY
Status: DISCONTINUED | OUTPATIENT
Start: 2022-10-05 | End: 2022-10-10 | Stop reason: HOSPADM

## 2022-10-05 RX ORDER — ZOLPIDEM TARTRATE 5 MG/1
5 TABLET ORAL NIGHTLY
COMMUNITY

## 2022-10-05 RX ORDER — ARFORMOTEROL TARTRATE 15 UG/2ML
15 SOLUTION RESPIRATORY (INHALATION) 2 TIMES DAILY
Status: DISCONTINUED | OUTPATIENT
Start: 2022-10-05 | End: 2022-10-10 | Stop reason: HOSPADM

## 2022-10-05 RX ORDER — HEPARIN SODIUM 5000 [USP'U]/ML
5000 INJECTION, SOLUTION INTRAVENOUS; SUBCUTANEOUS EVERY 8 HOURS SCHEDULED
Status: DISCONTINUED | OUTPATIENT
Start: 2022-10-05 | End: 2022-10-06 | Stop reason: SDUPTHER

## 2022-10-05 RX ADMIN — CEFEPIME 2000 MG: 2 INJECTION, POWDER, FOR SOLUTION INTRAVENOUS at 20:21

## 2022-10-05 RX ADMIN — SODIUM CHLORIDE 1000 ML: 9 INJECTION, SOLUTION INTRAVENOUS at 11:30

## 2022-10-05 RX ADMIN — ARFORMOTEROL TARTRATE 15 MCG: 15 SOLUTION RESPIRATORY (INHALATION) at 17:43

## 2022-10-05 RX ADMIN — ACETAMINOPHEN 650 MG: 325 TABLET ORAL at 22:28

## 2022-10-05 RX ADMIN — DOXYCYCLINE 100 MG: 100 INJECTION, POWDER, LYOPHILIZED, FOR SOLUTION INTRAVENOUS at 17:55

## 2022-10-05 RX ADMIN — HEPARIN SODIUM 5000 UNITS: 5000 INJECTION INTRAVENOUS; SUBCUTANEOUS at 22:28

## 2022-10-05 RX ADMIN — INSULIN GLARGINE 13 UNITS: 100 INJECTION, SOLUTION SUBCUTANEOUS at 17:45

## 2022-10-05 RX ADMIN — ATORVASTATIN CALCIUM 20 MG: 20 TABLET, FILM COATED ORAL at 20:29

## 2022-10-05 RX ADMIN — WATER 2000 MG: 1 INJECTION INTRAMUSCULAR; INTRAVENOUS; SUBCUTANEOUS at 13:27

## 2022-10-05 RX ADMIN — INSULIN LISPRO 2 UNITS: 100 INJECTION, SOLUTION INTRAVENOUS; SUBCUTANEOUS at 17:46

## 2022-10-05 RX ADMIN — BUDESONIDE 500 MCG: 0.5 SUSPENSION RESPIRATORY (INHALATION) at 17:42

## 2022-10-05 RX ADMIN — ACETAMINOPHEN 650 MG: 650 SUPPOSITORY RECTAL at 12:25

## 2022-10-05 ASSESSMENT — ENCOUNTER SYMPTOMS: SHORTNESS OF BREATH: 1

## 2022-10-05 ASSESSMENT — PAIN SCALES - GENERAL
PAINLEVEL_OUTOF10: 0
PAINLEVEL_OUTOF10: 0

## 2022-10-05 ASSESSMENT — PAIN - FUNCTIONAL ASSESSMENT: PAIN_FUNCTIONAL_ASSESSMENT: NONE - DENIES PAIN

## 2022-10-05 NOTE — PROGRESS NOTES
Called lab [facility]) looking for Resp Panel to ensure it was sent by ED.  Yes specimen was in the lab

## 2022-10-05 NOTE — TELEPHONE ENCOUNTER
Writer contacted ED provider, [facility], to inform of 30-day readmission risk via phone . No Decision on disposition at this time. Test waiting to be resulted to determine. Callback: If you need to callback to inform of disposition, please call [phone].

## 2022-10-05 NOTE — CARE COORDINATION
SS Note: SW did speak to [facility]. Pt is LTC resident there and is bed hold. NO Precert required to return.   Electronically signed by ESTHER Carter on 10/5/2022 at 5:55 PM

## 2022-10-05 NOTE — ED PROVIDER NOTES
HPI   HPI  Patient is a 66 y.o. female presents with a chief complaint of hypoxemia by EMS. Patient was stated to have O2 saturations in the 80s and 90s prior to arrival. Patient also had blood glucose 489 via ems. Patient is unable to answer any questions. Nursing was contacted and I personally spoke to nurse. She states that the patient is usually a lively person. She states that this morning she noticed that she was confused and somnolent. She also noticed that she had a fever. Patient's patient also had low oxygen saturation. EMS reported pulses ox is between  80% and 90% with a blood glucose of 489. Patient is on 2L of baseline oxygen. Review of Systems   Unable to perform ROS: Acuity of condition      Physical Exam  Vitals and nursing note reviewed. Constitutional:       General: She is in acute distress. Appearance: She is obese. She is ill-appearing and toxic-appearing. HENT:      Head: Normocephalic and atraumatic. Right Ear: There is no impacted cerumen. Left Ear: There is no impacted cerumen. Nose: Nose normal.      Mouth/Throat:      Mouth: Mucous membranes are dry. Eyes:      General:         Right eye: No discharge. Left eye: No discharge. Cardiovascular:      Rate and Rhythm: Normal rate. Heart sounds: No murmur heard. No friction rub. No gallop. Pulmonary:      Effort: No respiratory distress. Breath sounds: No stridor. Rales present. No wheezing or rhonchi. Chest:      Chest wall: No tenderness. Abdominal:      General: There is distension. Tenderness: There is abdominal tenderness. There is no guarding or rebound. Musculoskeletal:         General: Signs of injury (Amputation on the right foot.) present. Right lower leg: Edema present. Left lower leg: Edema present. Skin:     Coloration: Skin is pale. Neurological:      Mental Status: She is oriented to person, place, and time. EKG:   This EKG is signed and interpreted by me. Rate: 46  Rhythm: Atrial fibrillation  Interpretation: atrial fibrillation (chronic)  Comparison: stable as compared to patient's most recent EKG     Procedures     MDM     Amount and/or Complexity of Data Reviewed  Decide to obtain previous medical records or to obtain history from someone other than the patient: yes    Patient was immediately evaluated upon arrival to the emergency department. Due to patient's high glucose reported by EMS a DKA work-up was established patient's beta hydroxy butyrate was 0.1 making DKA unlikely. Patient's lactic acid was 2.0. Patient was noted to be hypercapnic with a PCO2 of 71.8. Patient was placed on BiPAP and serial blood gases demonstrated improvement. Initial pH was 7.26 with a repeat showing 7.316. Urinalysis demonstrated positive nitrates, white blood cells, and bacteria.  2 g of Rocephin was administered in the emergency department. Blood cultures were taken to evaluate for blood-borne sepsis. Patient was being administered digoxin and showed an elevated creatinine. Digoxin levels were obtained and found to be 2.2. Chart review demonstrated that patient was instructed to discontinue digoxin, the nursing home was still administering this medication. Patient was febrile upon arrival and was given a Tylenol suppository of 650 mg resulting in resolution of the fever. Troponin was elevated at 43 which was decreased from her troponin 2 and 3 days ago. Patient was COVID negative and influenza negative. Patient's anion gap was 10 within normal limits. An EKG was obtained and compared to one from October 3. Patient is in chronic atrial fibrillation and was stable with most recent EKG. Dr. Mary Jauregui was notified and patient was transferred to the ICU and accepted by Dr. Eleanor Christine. ED Course as of 11/01/22 1918   Wed Oct 05, 2022   1136 Patient found to be hypercapneic. Will start her on bipap.  [JS]   Fabrice 1762: I have personally performed and/or participated in the history, exam, medical decision making, and procedures and agree with all pertinent clinical information unless otherwise noted. I have also reviewed and agree with the past medical, family and social history unless otherwise noted. I have discussed this patient in detail with the resident and provided the instruction and education regarding the evidence-based evaluation and treatment of febrile illness with AMS. Any EKG that may have been performed has been personally reviewed by me and I agree with the documentation as noted by the resident. History: patient unable to provide history due to ams. My findings: Sameera Edgar is a 66 y.o. female whom is in mild distress. Physical exam reveals shallow breathing. Pupils 3 mm b/l. Dry mucous membranes. Heart RRR, lungs diminished in the bases. Abdomen is soft and nontender. No R/G/R. Lower extremities with foot wounds. My plan: Symptomatic and supportive care. Will evaluate labs, EKG, start bipap and due sepsis work up  Critical care 30 minutes not including billable procedures. Electronically signed by Alexandra Vieira DO on 10/5/22 at 12:09 PM EDT       [JS]   1320 BiPAP settings updated: rate 15, vt:334, Ve:4.9, PI: 20, Pt trigger:21, Ti/Ttot 18, 60% [JH]   1627 Dr. Brittany Ross agreed to accept the patient to the ICU. [JH]   7738 Third set of ABG with small improvement. pH is now 7.316 pCO2 70.7, pO2 172.5, HCO3 35.3, sO2 98.4% [JS]   1651 Patient is somnolent but arousable. Will continue with current settings. [JS]      ED Course User Index  [JH] Rodrigo Navarro DO  [JS] Alexandra Vieira DO      Patient is a 66 y.o. female presenting with . Patient was seen and evaluated by myself and my attending No att. providers found. Assessment and Plan discussed with attending provider, please see attestation for final plan of care.   This note was done using dictation software and there may be some grammatical errors associated with this. Brenda Frederick DO    ED Course as of 11/01/22 1918   Wed Oct 05, 2022   1136 Patient found to be hypercapneic. Will start her on bipap. [JS]   200   ATTENDING PROVIDER ATTESTATION:     I have personally performed and/or participated in the history, exam, medical decision making, and procedures and agree with all pertinent clinical information unless otherwise noted. I have also reviewed and agree with the past medical, family and social history unless otherwise noted. I have discussed this patient in detail with the resident and provided the instruction and education regarding the evidence-based evaluation and treatment of febrile illness with AMS. Any EKG that may have been performed has been personally reviewed by me and I agree with the documentation as noted by the resident. History: patient unable to provide history due to ams. My findings: Michelle Ray is a 66 y.o. female whom is in mild distress. Physical exam reveals shallow breathing. Pupils 3 mm b/l. Dry mucous membranes. Heart RRR, lungs diminished in the bases. Abdomen is soft and nontender. No R/G/R. Lower extremities with foot wounds. My plan: Symptomatic and supportive care. Will evaluate labs, EKG, start bipap and due sepsis work up  Critical care 30 minutes not including billable procedures. Electronically signed by Brenda Frederick DO on 10/5/22 at 12:09 PM EDT       [JS]   1320 BiPAP settings updated: rate 15, vt:334, Ve:4.9, PI: 20, Pt trigger:21, Ti/Ttot 18, 60% [JH]   1627 Dr. Juan Montgomery agreed to accept the patient to the ICU. [JH]   8851 Third set of ABG with small improvement. pH is now 7.316 pCO2 70.7, pO2 172.5, HCO3 35.3, sO2 98.4% [JS]   1651 Patient is somnolent but arousable. Will continue with current settings.  [JS]      ED Course User Index  [JH] Chuy Naidu DO  [JS] Brenda Frederick DO --------------------------------------------- PAST HISTORY ---------------------------------------------  Past Medical History:  has a past medical history of [facility] Acute on chronic congestive heart failure ([facility] Anxiety, Asthma, CAD (coronary artery disease), Cancer ([facility] Chronic kidney disease, COPD exacerbation ([facility] Depression, Diabetes mellitus ([facility] H/O mammogram, Hx MRSA infection, Hyperlipidemia, Hypertension, Lateral epicondylitis, Morbid obesity ([facility] SERENA on CPAP, Oxygen dependent, Parkinson's disease ([facility] and Tubal ligation status. Past Surgical History:  has a past surgical history that includes Gallbladder surgery; Toe amputation; Cholecystectomy; Colonoscopy (7/29/15); Endoscopy, colon, diagnostic (7/19/15); Upper gastrointestinal endoscopy; lumbar laminectomy; Tonsillectomy; Breast lumpectomy; Breast reduction surgery; Cardiac catheterization (4/28/2014); Cardiac catheterization (01/11/2022); CT GUIDED CHEST TUBE (7/8/2022); and Upper gastrointestinal endoscopy (N/A, 7/19/2022). Social History:  reports that she has never smoked. She has never used smokeless tobacco. She reports that she does not drink alcohol and does not use drugs. Family History: family history includes Cancer in her father and mother; Diabetes in her sister; Heart Disease in her sister; Other in her brother; Seizures in her son. The patients home medications have been reviewed.     Allergies: Ciprofloxacin and Codeine    -------------------------------------------------- RESULTS -------------------------------------------------    LABS:  Results for orders placed or performed during the hospital encounter of 10/05/22   Culture, Blood 2    Specimen: Blood   Result Value Ref Range    Culture, Blood 2 5 Days no growth    Culture, Blood 1    Specimen: Blood   Result Value Ref Range    Blood Culture, Routine 5 Days no growth    COVID-19, Rapid    Specimen: Nasopharyngeal Swab   Result Value Ref Range SARS-CoV-2, NAAT Not Detected Not Detected   RAPID INFLUENZA A/B ANTIGENS    Specimen: Nasopharyngeal   Result Value Ref Range    Influenza A by PCR Not Detected Not Detected    Influenza B by PCR Not Detected Not Detected   Culture, Urine    Specimen: Urine, clean catch   Result Value Ref Range    Organism Escherichia coli (A)     Urine Culture, Routine >100,000 CFU/ml        Susceptibility    Escherichia coli - BACTERIAL SUSCEPTIBILITY PANEL BY DARELL     amoxicillin-clavulanate Vanita Estrin Sensitive mcg/mL     ceFAZolin <=^4 Sensitive mcg/mL     cefepime <=^0.12 Sensitive mcg/mL     cefotaxime <=^0.25 Sensitive mcg/mL     cefOXitin ^8 Sensitive mcg/mL     cefTAZidime-avibactam ^0. 25 Sensitive mcg/mL     gentamicin <=^1 Sensitive mcg/mL     levofloxacin >=^8 Resistant mcg/mL     meropenem <=^0.25 Sensitive mcg/mL     nitrofurantoin <=^16 Sensitive mcg/mL     piperacillin-tazobactam <=^4 Sensitive mcg/mL     trimethoprim-sulfamethoxazole <=^20 Sensitive mcg/mL   Respiratory Panel, Molecular, with COVID-19 (Restricted: peds pts or suitable admitted adults)    Specimen: Nasopharyngeal   Result Value Ref Range    Adenovirus by PCR Not Detected Not Detected    Bordetella parapertussis by PCR Not Detected Not Detected    Bordetella pertussis by PCR Not Detected Not Detected    Chlamydophilia pneumoniae by PCR Not Detected Not Detected    Coronavirus 229E by PCR Not Detected Not Detected    Coronavirus HKU1 by PCR Not Detected Not Detected    Coronavirus NL63 by PCR Not Detected Not Detected    Coronavirus OC43 by PCR Not Detected Not Detected    SARS-CoV-2, PCR Not Detected Not Detected    Human Metapneumovirus by PCR Not Detected Not Detected    Human Rhinovirus/Enterovirus by PCR Not Detected Not Detected    Influenza A by PCR Not Detected Not Detected    Influenza B by PCR Not Detected Not Detected    Mycoplasma pneumoniae by PCR Not Detected Not Detected    Parainfluenza Virus 1 by PCR Not Detected Not Detected    Parainfluenza Virus 2 by PCR Not Detected Not Detected    Parainfluenza Virus 3 by PCR Not Detected Not Detected    Parainfluenza Virus 4 by PCR Not Detected Not Detected    Respiratory Syncytial Virus by PCR Not Detected Not Detected   Culture, Blood 1    Specimen: Blood   Result Value Ref Range    Blood Culture, Routine 5 Days no growth    COVID-19, Rapid    Specimen: Nasopharyngeal Swab   Result Value Ref Range    SARS-CoV-2, NAAT Not Detected Not Detected   CBC with Auto Differential   Result Value Ref Range    WBC 9.9 4.5 - 11.5 E9/L    RBC 3.99 3.50 - 5.50 E12/L    Hemoglobin 10.9 (L) 11.5 - 15.5 g/dL    Hematocrit 37.4 34.0 - 48.0 %    MCV 93.7 80.0 - 99.9 fL    MCH 27.3 26.0 - 35.0 pg    MCHC 29.1 (L) 32.0 - 34.5 %    RDW 15.5 (H) 11.5 - 15.0 fL    Platelets 666 639 - 033 E9/L    MPV 11.4 7.0 - 12.0 fL    Neutrophils % 82.4 (H) 43.0 - 80.0 %    Immature Granulocytes % 1.1 0.0 - 5.0 %    Lymphocytes % 9.4 (L) 20.0 - 42.0 %    Monocytes % 6.5 2.0 - 12.0 %    Eosinophils % 0.2 0.0 - 6.0 %    Basophils % 0.4 0.0 - 2.0 %    Neutrophils Absolute 8.16 (H) 1.80 - 7.30 E9/L    Immature Granulocytes # 0.11 E9/L    Lymphocytes Absolute 0.93 (L) 1.50 - 4.00 E9/L    Monocytes Absolute 0.64 0.10 - 0.95 E9/L    Eosinophils Absolute 0.02 (L) 0.05 - 0.50 E9/L    Basophils Absolute 0.04 0.00 - 0.20 E9/L   Comprehensive Metabolic Panel w/ Reflex to MG   Result Value Ref Range    Sodium 138 132 - 146 mmol/L    Potassium reflex Magnesium 4.2 3.5 - 5.0 mmol/L    Chloride 94 (L) 98 - 107 mmol/L    CO2 34 (H) 22 - 29 mmol/L    Anion Gap 10 7 - 16 mmol/L    Glucose 309 (H) 74 - 99 mg/dL    BUN 26 (H) 6 - 23 mg/dL    Creatinine 1.3 (H) 0.5 - 1.0 mg/dL    GFR Non-African American 40 >=60 mL/min/1.73    GFR African American 49     Calcium 9.1 8.6 - 10.2 mg/dL    Total Protein 6.7 6.4 - 8.3 g/dL    Albumin 3.7 3.5 - 5.2 g/dL    Total Bilirubin 0.7 0.0 - 1.2 mg/dL    Alkaline Phosphatase 92 35 - 104 U/L    ALT 5 0 - 32 U/L    AST 7 0 - 31 U/L Troponin   Result Value Ref Range    Troponin, High Sensitivity 43 (H) 0 - 9 ng/L   Brain Natriuretic Peptide   Result Value Ref Range    Pro-BNP 12,924 (H) 0 - 125 pg/mL   Beta-Hydroxybutyrate   Result Value Ref Range    Beta-Hydroxybutyrate 0.10 0.02 - 0.27 mmol/L   Lactate, Sepsis   Result Value Ref Range    Lactic Acid, Sepsis 2.0 (H) 0.5 - 1.9 mmol/L   Digoxin Level   Result Value Ref Range    Digoxin Lvl 2.2 (H) 0.8 - 2.0 ng/mL   Blood Gas, Arterial   Result Value Ref Range    Date Analyzed 20221005     Time Analyzed 1630     Source: Blood Arterial     pH, Blood Gas 7.316 (L) 7.350 - 7.450    PCO2 70.7 (HH) 35.0 - 45.0 mmHg    PO2 172.5 (H) 75.0 - 100.0 mmHg    HCO3 35.3 (H) 22.0 - 26.0 mmol/L    B.E. 7.4 (H) -3.0 - 3.0 mmol/L    O2 Sat 98.4 92.0 - 98.5 %    PO2/FIO2 2.88 mmHg/%    AaDO2 162.5 mmHg    RI(T) 0.94     O2Hb 97.8 (H) 94.0 - 97.0 %    COHb 0.2 0.0 - 1.5 %    MetHb 0.4 0.0 - 1.5 %    O2 Content 14.4 mL/dL    HHb 1.6 0.0 - 5.0 %    tHb (est) 10.2 (L) 11.5 - 16.5 g/dL    Mode BILEVEL     FIO2 60.0 %    Peep/Cpap 6.0 cmH2O    PS 20 cmH20    Comment       presented to Dr. Vince Tavera to EMR as results not seen in EPIC 10-6-0011:18 mmb    Date Of Collection      Time Collected      Pt Temp 37.0 C     ID [id number]     Lab [id number]     Critical(s) Notified Handed report to Dr/RN    Urinalysis with Microscopic   Result Value Ref Range    Color, UA Yellow Straw/Yellow    Clarity, UA SLCLOUDY Clear    Glucose, Ur 500 (A) Negative mg/dL    Bilirubin Urine Negative Negative    Ketones, Urine Negative Negative mg/dL    Specific Gravity, UA 1.020 1.005 - 1.030    Blood, Urine MODERATE (A) Negative    pH, UA 5.5 5.0 - 9.0    Protein, UA 30 (A) Negative mg/dL    Urobilinogen, Urine 0.2 <2.0 E.U./dL    Nitrite, Urine POSITIVE (A) Negative    Leukocyte Esterase, Urine Negative Negative    WBC, UA 5-10 (A) 0 - 5 /HPF    RBC, UA 5-10 (A) 0 - 2 /HPF    Epithelial Cells, UA RARE /HPF    Bacteria, UA MANY (A) None Seen /HPF   Arterial Blood Gas, Respiratory Only   Result Value Ref Range    POC Source Arterial     FIO2 3.0     PH 37 7.255 (L) 7.350 - 7.450    PCO2 37 75.7 (HH) 35.0 - 45.0 mmHg    PO2 37 75.5 60.0 - 80.0 mmHg    HCO3 33.6 (H) 22.0 - 26.0 mmol/L    B.E. 4.4 (H) -3.0 - 3.0 mmol/L    O2 Sat 91.6 (L) 92.0 - 98.5 %     ID 2,220     DEVICE [id number]     Critical Notification Yes    Arterial Blood Gas, Respiratory Only   Result Value Ref Range    POC Source Arterial     FIO2 60.0     PH 37 7.266 (L) 7.350 - 7.450    PCO2 37 71.8 (HH) 35.0 - 45.0 mmHg    PO2 37 116.3 (H) 60.0 - 80.0 mmHg    HCO3 32.6 (H) 22.0 - 26.0 mmol/L    B.E. 4.1 (H) -3.0 - 3.0 mmol/L    O2 Sat 97.6 92.0 - 98.5 %     ID 2,220     DEVICE 17,[id number]     Critical Notification Yes     Mode BiLevel     Pressure Support 12 cmH2O    Positive End EXP Press 6 cmH2O   Lactic Acid   Result Value Ref Range    Lactic Acid 1.6 0.5 - 2.2 mmol/L   Lactic Acid   Result Value Ref Range    Lactic Acid 1.5 0.5 - 2.2 mmol/L   Blood Gas, Arterial   Result Value Ref Range    Date Analyzed 20221005     Time Analyzed 1754     Source: Blood Arterial     pH, Blood Gas 7.454 (H) 7.350 - 7.450    PCO2 47.7 (H) 35.0 - 45.0 mmHg    PO2 252.2 (H) 75.0 - 100.0 mmHg    HCO3 32.7 (H) 22.0 - 26.0 mmol/L    B.E. 7.8 (H) -3.0 - 3.0 mmol/L    O2 Sat 98.7 (H) 92.0 - 98.5 %    PO2/FIO2 4.20 mmHg/%    AaDO2 108.1 mmHg    RI(T) 0.43     O2Hb 98.0 (H) 94.0 - 97.0 %    COHb 0.3 0.0 - 1.5 %    MetHb 0.4 0.0 - 1.5 %    O2 Content 14.6 mL/dL    HHb 1.3 0.0 - 5.0 %    tHb (est) 10.1 (L) 11.5 - 16.5 g/dL    Mode BILEVEL     FIO2 60.0 %    Peep/Cpap 6.0 cmH2O    PS 20 cmH20    Date Of Collection      Time Collected      Pt Temp 37.0 C     ID [id number]     Lab [id number]     Critical(s) Notified .  No Critical Values    Comprehensive Metabolic Panel   Result Value Ref Range    Sodium 142 132 - 146 mmol/L    Potassium 4.0 3.5 - 5.0 mmol/L    Chloride 103 98 - 107 mmol/L CO2 33 (H) 22 - 29 mmol/L    Anion Gap 6 (L) 7 - 16 mmol/L    Glucose 134 (H) 74 - 99 mg/dL    BUN 25 (H) 6 - 23 mg/dL    Creatinine 1.1 (H) 0.5 - 1.0 mg/dL    GFR Non-African American 49 >=60 mL/min/1.73    GFR African American 59     Calcium 8.4 (L) 8.6 - 10.2 mg/dL    Total Protein 5.2 (L) 6.4 - 8.3 g/dL    Albumin 3.0 (L) 3.5 - 5.2 g/dL    Total Bilirubin 0.5 0.0 - 1.2 mg/dL    Alkaline Phosphatase 68 35 - 104 U/L    ALT 5 0 - 32 U/L    AST 8 0 - 31 U/L   Magnesium   Result Value Ref Range    Magnesium 1.8 1.6 - 2.6 mg/dL   Phosphorus   Result Value Ref Range    Phosphorus 2.6 2.5 - 4.5 mg/dL   Digoxin Level   Result Value Ref Range    Digoxin Lvl 1.6 0.8 - 2.0 ng/mL   CBC with Auto Differential   Result Value Ref Range    WBC 5.5 4.5 - 11.5 E9/L    RBC 3.48 (L) 3.50 - 5.50 E12/L    Hemoglobin 9.3 (L) 11.5 - 15.5 g/dL    Hematocrit 32.5 (L) 34.0 - 48.0 %    MCV 93.4 80.0 - 99.9 fL    MCH 26.7 26.0 - 35.0 pg    MCHC 28.6 (L) 32.0 - 34.5 %    RDW 15.4 (H) 11.5 - 15.0 fL    Platelets 406 928 - 213 E9/L    MPV 11.0 7.0 - 12.0 fL    Neutrophils % 78.1 43.0 - 80.0 %    Lymphocytes % 13.2 (L) 20.0 - 42.0 %    Monocytes % 6.1 2.0 - 12.0 %    Eosinophils % 0.7 0.0 - 6.0 %    Basophils % 0.4 0.0 - 2.0 %    Neutrophils Absolute 4.35 1.80 - 7.30 E9/L    Lymphocytes Absolute 0.83 (L) 1.50 - 4.00 E9/L    Monocytes Absolute 0.33 0.10 - 0.95 E9/L    Eosinophils Absolute 0.00 (L) 0.05 - 0.50 E9/L    Basophils Absolute 0.00 0.00 - 0.20 E9/L    Atypical Lymphocytes Relative 1.8 0.0 - 4.0 %    Myelocyte Percent 0.9 0 - 0 %    nRBC 0.9 /100 WBC    Polychromasia 1+     Hypochromia 2+     Poikilocytes 1+     Ovalocytes 1+    Arterial Blood Gas, Respiratory Only   Result Value Ref Range    POC Source Arterial     FIO2 60.0     PH 37 7.367 7.350 - 7.450    PCO2 37 55.3 (H) 35.0 - 45.0 mmHg    PO2 37 164.5 (H) 60.0 - 80.0 mmHg    HCO3 31.8 (H) 22.0 - 26.0 mmol/L    B.E. 5.4 (H) -3.0 - 3.0 mmol/L    O2 Sat 99.4 (H) 92.0 - 98.5 %  ID 8,426     DEVICE 15,[id number]     Mode bipap14/6    Comprehensive Metabolic Panel   Result Value Ref Range    Sodium 141 132 - 146 mmol/L    Potassium 3.7 3.5 - 5.0 mmol/L    Chloride 103 98 - 107 mmol/L    CO2 34 (H) 22 - 29 mmol/L    Anion Gap 4 (L) 7 - 16 mmol/L    Glucose 149 (H) 74 - 99 mg/dL    BUN 24 (H) 6 - 23 mg/dL    Creatinine 1.0 0.5 - 1.0 mg/dL    GFR Non-African American 54 >=60 mL/min/1.73    GFR African American >60     Calcium 8.5 (L) 8.6 - 10.2 mg/dL    Total Protein 5.3 (L) 6.4 - 8.3 g/dL    Albumin 2.8 (L) 3.5 - 5.2 g/dL    Total Bilirubin 0.5 0.0 - 1.2 mg/dL    Alkaline Phosphatase 71 35 - 104 U/L    ALT 7 0 - 32 U/L    AST 8 0 - 31 U/L   Magnesium   Result Value Ref Range    Magnesium 1.8 1.6 - 2.6 mg/dL   Phosphorus   Result Value Ref Range    Phosphorus 2.8 2.5 - 4.5 mg/dL   CBC with Auto Differential   Result Value Ref Range    WBC 3.7 (L) 4.5 - 11.5 E9/L    RBC 3.38 (L) 3.50 - 5.50 E12/L    Hemoglobin 9.1 (L) 11.5 - 15.5 g/dL    Hematocrit 31.5 (L) 34.0 - 48.0 %    MCV 93.2 80.0 - 99.9 fL    MCH 26.9 26.0 - 35.0 pg    MCHC 28.9 (L) 32.0 - 34.5 %    RDW 15.5 (H) 11.5 - 15.0 fL    Platelets 174 701 - 105 E9/L    MPV 11.1 7.0 - 12.0 fL    Neutrophils % 71.1 43.0 - 80.0 %    Lymphocytes % 22.8 20.0 - 42.0 %    Monocytes % 4.4 2.0 - 12.0 %    Eosinophils % 1.8 0.0 - 6.0 %    Basophils % 0.5 0.0 - 2.0 %    Neutrophils Absolute 2.63 1.80 - 7.30 E9/L    Lymphocytes Absolute 0.85 (L) 1.50 - 4.00 E9/L    Monocytes Absolute 0.15 0.10 - 0.95 E9/L    Eosinophils Absolute 0.07 0.05 - 0.50 E9/L    Basophils Absolute 0.00 0.00 - 0.20 E9/L    Anisocytosis 1+     Polychromasia 1+     Poikilocytes 2+     [place] Cells 2+     Ovalocytes 1+    Blood Gas, Arterial   Result Value Ref Range    Date Analyzed 20221007     Time Analyzed 0500     Source: Blood Arterial     pH, Blood Gas 7.387 7.350 - 7.450    PCO2 56.0 (H) 35.0 - 45.0 mmHg    PO2 138.8 (H) 75.0 - 100.0 mmHg    HCO3 32.9 (H) 22.0 - 26.0 mmol/L    B.E. 6.8 (H) -3.0 - 3.0 mmol/L    O2 Sat 98.2 92.0 - 98.5 %    PO2/FIO2 3.08 mmHg/%    AaDO2 107.1 mmHg    RI(T) 0.77     O2Hb 97.6 (H) 94.0 - 97.0 %    COHb 0.3 0.0 - 1.5 %    MetHb 0.3 0.0 - 1.5 %    O2 Content 13.7 mL/dL    HHb 1.8 0.0 - 5.0 %    tHb (est) 9.8 (L) 11.5 - 16.5 g/dL    Mode BILEVEL     FIO2 45.0 %    Peep/Cpap 6.0 cmH2O    Date Of Collection      Time Collected      Pt Temp 37.0 C     ID [id number]     Lab [id number]     Critical(s) Notified .  No Critical Values    Comprehensive Metabolic Panel   Result Value Ref Range    Sodium 144 132 - 146 mmol/L    Potassium 4.0 3.5 - 5.0 mmol/L    Chloride 106 98 - 107 mmol/L    CO2 34 (H) 22 - 29 mmol/L    Anion Gap 4 (L) 7 - 16 mmol/L    Glucose 174 (H) 74 - 99 mg/dL    BUN 21 6 - 23 mg/dL    Creatinine 1.0 0.5 - 1.0 mg/dL    GFR Non-African American 54 >=60 mL/min/1.73    GFR African American >60     Calcium 8.5 (L) 8.6 - 10.2 mg/dL    Total Protein 4.8 (L) 6.4 - 8.3 g/dL    Albumin 2.5 (L) 3.5 - 5.2 g/dL    Total Bilirubin 0.4 0.0 - 1.2 mg/dL    Alkaline Phosphatase 74 35 - 104 U/L    ALT 5 0 - 32 U/L    AST 7 0 - 31 U/L   Magnesium   Result Value Ref Range    Magnesium 1.9 1.6 - 2.6 mg/dL   Phosphorus   Result Value Ref Range    Phosphorus 3.0 2.5 - 4.5 mg/dL   CBC with Auto Differential   Result Value Ref Range    WBC 3.1 (L) 4.5 - 11.5 E9/L    RBC 3.17 (L) 3.50 - 5.50 E12/L    Hemoglobin 8.5 (L) 11.5 - 15.5 g/dL    Hematocrit 28.9 (L) 34.0 - 48.0 %    MCV 91.2 80.0 - 99.9 fL    MCH 26.8 26.0 - 35.0 pg    MCHC 29.4 (L) 32.0 - 34.5 %    RDW 15.0 11.5 - 15.0 fL    Platelets 134 362 - 473 E9/L    MPV 10.9 7.0 - 12.0 fL    Neutrophils % 51.3 43.0 - 80.0 %    Immature Granulocytes % 1.3 0.0 - 5.0 %    Lymphocytes % 32.8 20.0 - 42.0 %    Monocytes % 12.7 (H) 2.0 - 12.0 %    Eosinophils % 1.3 0.0 - 6.0 %    Basophils % 0.6 0.0 - 2.0 %    Neutrophils Absolute 1.61 (L) 1.80 - 7.30 E9/L    Immature Granulocytes # 0.04 E9/L    Lymphocytes Absolute 1.03 (L) 1.50 - 4.00 E9/L    Monocytes Absolute 0.40 0.10 - 0.95 E9/L    Eosinophils Absolute 0.04 (L) 0.05 - 0.50 E9/L    Basophils Absolute 0.02 0.00 - 0.20 E9/L   Blood Gas, Arterial   Result Value Ref Range    Date Analyzed 20221008     Time Analyzed 0445     Source: Blood Arterial     pH, Blood Gas 7.401 7.350 - 7.450    PCO2 57.2 (H) 35.0 - 45.0 mmHg    PO2 99.5 75.0 - 100.0 mmHg    HCO3 34.7 (H) 22.0 - 26.0 mmol/L    B.E. 8.6 (H) -3.0 - 3.0 mmol/L    O2 Sat 96.9 92.0 - 98.5 %    PO2/FIO2 2.84 mmHg/%    AaDO2 74.8 mmHg    RI(T) 0.75     O2Hb 96.1 94.0 - 97.0 %    COHb 0.3 0.0 - 1.5 %    MetHb 0.5 0.0 - 1.5 %    O2 Content 13.3 mL/dL    HHb 3.1 0.0 - 5.0 %    tHb (est) 9.7 (L) 11.5 - 16.5 g/dL    Mode BILEVEL     FIO2 35.0 %    Peep/Cpap 6.0 cmH2O    PS 20 cmH20    Date Of Collection      Time Collected      Pt Temp 37.0 C     ID [id number]     Lab [id number]     Critical(s) Notified .  No Critical Values    POCT Glucose   Result Value Ref Range    Meter Glucose 286 (H) 74 - 99 mg/dL   POCT Glucose   Result Value Ref Range    Meter Glucose 210 (H) 74 - 99 mg/dL   POCT Glucose   Result Value Ref Range    Meter Glucose 174 (H) 74 - 99 mg/dL   POCT Glucose   Result Value Ref Range    Meter Glucose 142 (H) 74 - 99 mg/dL   POCT Glucose   Result Value Ref Range    Meter Glucose 154 (H) 74 - 99 mg/dL   POCT Glucose   Result Value Ref Range    Meter Glucose 221 (H) 74 - 99 mg/dL   POCT Glucose   Result Value Ref Range    Meter Glucose 255 (H) 74 - 99 mg/dL   POCT Glucose   Result Value Ref Range    Meter Glucose 166 (H) 74 - 99 mg/dL   POCT Glucose   Result Value Ref Range    Meter Glucose 235 (H) 74 - 99 mg/dL   POCT Glucose   Result Value Ref Range    Meter Glucose 171 (H) 74 - 99 mg/dL   POCT Glucose   Result Value Ref Range    Meter Glucose 254 (H) 74 - 99 mg/dL   POCT Glucose   Result Value Ref Range    Meter Glucose 174 (H) 74 - 99 mg/dL   POCT Glucose   Result Value Ref Range    Meter Glucose 196 (H) 74 - 99 mg/dL   POCT Glucose   Result Value Ref Range    Meter Glucose 257 (H) 74 - 99 mg/dL   POCT Glucose   Result Value Ref Range    Meter Glucose 292 (H) 74 - 99 mg/dL   POCT Glucose   Result Value Ref Range    Meter Glucose 279 (H) 74 - 99 mg/dL   POCT Glucose   Result Value Ref Range    Meter Glucose 194 (H) 74 - 99 mg/dL   POCT Glucose   Result Value Ref Range    Meter Glucose 229 (H) 74 - 99 mg/dL   POCT Glucose   Result Value Ref Range    Meter Glucose 281 (H) 74 - 99 mg/dL   POCT Glucose   Result Value Ref Range    Meter Glucose 249 (H) 74 - 99 mg/dL   POCT Glucose   Result Value Ref Range    Meter Glucose 134 (H) 74 - 99 mg/dL   POCT Glucose   Result Value Ref Range    Meter Glucose 194 (H) 74 - 99 mg/dL   EKG 12 Lead   Result Value Ref Range    Ventricular Rate 86 BPM    Atrial Rate 46 BPM    QRS Duration 82 ms    Q-T Interval 308 ms    QTc Calculation (Bazett) 368 ms    R Axis 86 degrees    T Axis -102 degrees   EKG 12 Lead   Result Value Ref Range    Ventricular Rate 140 BPM    Atrial Rate 150 BPM    QRS Duration 76 ms    Q-T Interval 272 ms    QTc Calculation (Bazett) 415 ms    R Axis 76 degrees    T Axis -127 degrees       RADIOLOGY:  CT HEAD WO CONTRAST   Final Result   No acute intracranial abnormality. US DUP UPPER EXTREMITY LEFT VENOUS   Final Result   No evidence of DVT. RECOMMENDATIONS:   Unavailable         XR CHEST PORTABLE   Final Result   Redemonstration of interstitial pulmonary edema or pneumonia bilaterally   appearing to have increased in right lung base. XR CHEST PORTABLE   Final Result   Redemonstration of an enlarged cardiac silhouette along with right basilar   pleural and parenchymal disease. CT ABDOMEN PELVIS W IV CONTRAST Additional Contrast? None   Final Result   Subtle suggestion of hepatic steatosis and cirrhosis including portal venous   hypertension with splenomegaly. Small volume abdominal ascites and mild anasarca.       Bilateral lower lobe infiltrates and pleural effusions, right more than left. Pneumonia is suspected. Stable benign left adrenal adenoma. No imaging follow-up is required. Chronic left renal scarring probably due to remote infection. Small collection of gas within the endometrial cavity presumably   iatrogenically introduced. No definite uterine pathology is appreciated. No   focal uterine fluid collections. The uterus could be further evaluated   sonographically if clinically indicated. Stool-filled colon particularly on the right suggesting constipation. XR CHEST PORTABLE   Final Result   Cardiomegaly and borderline pulmonary vascular congestion. Suspected   developing right basilar atelectasis and possible pleural effusion. XR CHEST PORTABLE   Final Result   Enlarged cardiac silhouette. Parahilar peribronchial thickening and mild   pulmonary interstitial changes. Right basilar atelectasis and elevated right   hemidiaphragm. ------------------------- NURSING NOTES AND VITALS REVIEWED ---------------------------  Date / Time Roomed:  10/5/2022 10:40 AM  ED Bed Assignment:  [id number]    The nursing notes within the ED encounter and vital signs as below have been reviewed. No data found. Oxygen Saturation Interpretation: Normal    ------------------------------------------ PROGRESS NOTES ------------------------------------------  Re-evaluation(s):   Patients symptoms are improving  Repeat physical examination is improved    Counseling:  I have spoken with the patient and discussed todays results, in addition to providing specific details for the plan of care and counseling regarding the diagnosis and prognosis. Their questions are answered at this time and they are agreeable with the plan of admission.    --------------------------------- ADDITIONAL PROVIDER NOTES ---------------------------------  Consultations:  Spoke with Dr. Theodore Weber. Discussed case.   They will admit the patient. This patient's ED course included: a personal history and physicial examination, re-evaluation prior to disposition, multiple bedside re-evaluations, IV medications, cardiac monitoring, continuous pulse oximetry, and complex medical decision making and emergency management    This patient has remained hemodynamically stable during their ED course. Diagnosis:  1. Septicemia ([address]    2. Acute respiratory failure with hypoxia and hypercapnia (HCC)    3. Urinary tract infection in female    4. Anxiety    5. Chronic diastolic (congestive) heart failure (HCC)    6.  Acute diastolic (congestive) heart failure ([address]        Disposition:  Patient's disposition: Admit to CCU/ICU  Patient's condition is Stable        Emma Vasquez DO  Resident  10/05/22 2105       Emma Vasquez DO  Resident  10/05/22 2114       Jacque Roy DO  11/01/22 1918

## 2022-10-05 NOTE — CONSULTS
PULMONARY MEDICINE CONSULT    Consult requested by: Dr Pedro Hernandez  Reason for consult : Hypercapnic respiratory failure     HPI  66year old person with PMH of  morbid obesity with BMI 39,  HFpEF, obesity hypoventilation syndrome, SERENA, atrial fibrillation, anxiety, CAD, breast cancer, CKD, acute on chronic respiratory failure, and as described below admitted to hospital for management of acute on chronic hypoxemic respiratory failure and pyelonephritis. She is being managed with BPAP, ceftriaxone. Ms Krissy Bocanegra states she feels scared and feels she will die, asked to have her daughter called and dialed herself on the phone. I spoke with her daughter Radha Reza over the phone, she is very upset because the patient is in the ICU and nobody informed her of the admission, she asked me why was the patient discharged from the ED on Tuesday? Why didn't the ED obtain a blood gas at the time? Why was she discharged at 3 AM and sent to the nursing home? I explained her the patient had just been admitted to ICU and I could not respond for the care she received on Tuesday, I explained her the current treatment and the plan of care including repeat ABG, BPAP, and management in ICU. The patient's nurse also talked to the patient's daughter and told her he could not answer for the treatment provided in ED few days ago but we are trying to provide the best care possible to the patient. The patient advocate will be contacted in AM.     The patient's daughter also would like to have ENT evaluate the patient as she has been hoarse since being intubated in July, states nobody has checked her vocal cords and nobody listens to her request of evaluation. ENT to be consulted tomorrow once off BPAP.        Review of Systems - History obtained from the patient  General ROS: positive for  - feeling unwell, feels she will die this admission  Psychological ROS: positive for - anxiety  Ophthalmic ROS: negative  ENT ROS: positive for - vocal changes  Allergy and Immunology ROS: negative for - nasal congestion  Hematological and Lymphatic ROS: negative for - bleeding problems or blood clots  Endocrine ROS: negative  Breast ROS: negative for breast lumps  Respiratory ROS: positive for - cough and shortness of breath  Cardiovascular ROS: negative for - chest pain, irregular heartbeat, or palpitations  Gastrointestinal ROS: no abdominal pain, change in bowel habits, or black or bloody stools  Genito-Urinary ROS: no dysuria, trouble voiding, or hematuria  Musculoskeletal ROS: positive for - muscular weakness  Neurological ROS: no TIA or stroke symptoms  Dermatological ROS: negative for - rash      /60   Pulse 81   Temp 100.2 °F (37.9 °C) (Bladder)   Resp 29   SpO2 100%   General: Awake, oriented to place and person  HEENT: No head lesions, PERRL, EOMI, mouth without lesions, no nasal lesions, hoarseness, no cervical adenopathy palpated  Respiratory: Lungs with equal breath sounds bilaterally, no adventitious sounds auscultated, no accessory muscle use  CV: Regular rate, no murmurs, JVD 3 cm above clavicle, 1+ leg edema  Abdomen: Soft, non tender, + bowel sounds, no lesions  Skin: pale and dehydrated, decreased turgor, no rash, capillary refill <2 seconds  Extremities: Muscular strength 4/5 in 4 limbs, moves 4 limbs spontaneously, distal pulses present, multiple toes amputated  Neurology: Awake and alert, follows commands, moves 4 limbs on command and spontaneously, neck is supple, no meningitic signs present. A/P:  1) Acute hypoxemic and hypercapnic respiratory failure secondary to acute diastolic dysfunction and possibly pneumonia in the setting of SERENA.    --BPAP with oxygen supplementation to maintain sats > 89%  --Antimicrobial therapy with cefepime/doxycycline  --Bronchodilators: Arformoterol/budesonide + Duonebs  --Diuresis and afterload reduction as tolerated by blood pressure     2) Sepsis due to pyelonephritis and possible pneumonia  --Antimicrobial therapy with cefepime/doxycycline    3) Acute kidney injury in a patient with CKD  --Avoid nephrotoxins and dose medications according GFR    4) Diabetes mellitus  --Management with insulin administration     5) Atrial fibrillation  --Managed chronically with digoxin, hold for now and check level tomorrow    6) Anxiety  --PRN Lorazepam <chronic user    7) Dysphonia  --ENT consult once off BPAP     DVT prophylaxis with heparin SQ  NPO for now    Past Medical History:   Diagnosis Date    A-fib ([facility]     Acute on chronic congestive heart failure (HCC)     Anxiety     Asthma     CAD (coronary artery disease) 01/21/2016    Cancer ([facility]  breast ca 2006    right lumpectomy    Chronic kidney disease     nephrolithiasis    Depression     Diabetes mellitus (HCC)     H/O mammogram     Hx MRSA infection     toe infection january 2012    Hyperlipidemia     Hypertension     Lateral epicondylitis     SERENA on CPAP     Parkinson's disease ([facility]     Tubal ligation status      Family History   Problem Relation Age of Onset    Cancer Mother         Lung Ca    Cancer Father         lung Ca    Diabetes Sister     Heart Disease Sister     Seizures Son     Other Brother         sepsis     Social History     Socioeconomic History    Marital status:       Spouse name: Not on file    Number of children: Not on file    Years of education: Not on file    Highest education level: Not on file   Occupational History    Not on file   Tobacco Use    Smoking status: Never    Smokeless tobacco: Never   Vaping Use    Vaping Use: Never used   Substance and Sexual Activity    Alcohol use: No    Drug use: No    Sexual activity: Never   Other Topics Concern    Not on file   Social History Narrative    Not on file     Social Determinants of Health     Financial Resource Strain: Not on file   Food Insecurity: Not on file   Transportation Needs: Not on file   Physical Activity: Not on file   Stress: Not on file   Social Connections: Not on file   Intimate Partner Violence: Not on file   Housing Stability: Not on file     Current Facility-Administered Medications   Medication Dose Route Frequency Provider Last Rate Last Admin    cefTRIAXone (ROCEPHIN) 2,000 mg in sterile water 20 mL IV syringe  2,000 mg IntraVENous Q24H Nirmala Zimmerman MD        insulin glargine (LANTUS) injection vial 13 Units  13 Units SubCUTAneous Daily Nirmala Zimmerman MD        insulin lispro (HUMALOG) injection vial 0-8 Units  0-8 Units SubCUTAneous Caron Gomez MD        perflutren lipid microspheres (DEFINITY) injection 1.65 mg  1.5 mL IntraVENous ONCE PRN Nimrala Zimmerman MD         Current Outpatient Medications   Medication Sig Dispense Refill    metoprolol succinate (TOPROL XL) 25 MG extended release tablet Take 3 tablets by mouth in the morning and at bedtime 180 tablet 0    digoxin (LANOXIN) 125 MCG tablet Take 1 tablet by mouth daily 30 tablet 0    bumetanide (BUMEX) 0.5 MG tablet Take 1 tablet by mouth 2 times daily 60 tablet 0    docusate sodium (COLACE, DULCOLAX) 100 MG CAPS Take 100 mg by mouth 2 times daily as needed for Constipation      polyethylene glycol (GLYCOLAX) 17 g packet Take 17 g by mouth daily as needed for Constipation 527 g 0    insulin glargine (LANTUS) 100 UNIT/ML injection vial Inject 13 Units into the skin 2 times daily 10 mL 0    insulin lispro (HUMALOG) 100 UNIT/ML SOLN injection vial Inject 3 Units into the skin 3 times daily (with meals)      acetaZOLAMIDE (DIAMOX) 250 MG tablet Take 0.5 tablets by mouth daily for 4 days 2 tablet 0    atorvastatin (LIPITOR) 20 MG tablet Take 20 mg by mouth nightly      benzoin compound solution Apply 1 Applicatorful topically nightly Apply topically to right toe      ipratropium-albuterol (DUONEB) 0.5-2.5 (3) MG/3ML SOLN nebulizer solution Inhale 1 vial into the lungs 4 times daily      LORazepam (ATIVAN) 0.5 MG tablet Take 0.5 mg by mouth every 12 hours as needed for Anxiety. melatonin 5 MG TABS tablet Take 5 mg by mouth nightly      miconazole (MICOTIN) 2 % powder Apply 1 each topically 2 times daily Apply topically under breasts twice daily      insulin aspart (NOVOLOG) 100 UNIT/ML injection vial Inject 0-10 Units into the skin 3 times daily (before meals) Blood Glucose 140 - 199 = 1 Unit  Blood Glucose 200 - 249 = 2 Units  Blood Glucose 250 - 299 = 4 Units  Blood Glucose 300 - 349 = 6 Units  Blood Glucose 350 - 399 = 8 Units  Blood Glucose 400+  = 10 Units      pantoprazole (PROTONIX) 40 MG tablet Take 40 mg by mouth every morning      promethazine (PHENERGAN) 25 MG tablet Take 25 mg by mouth every 6 hours as needed for Nausea      mirtazapine (REMERON) 15 MG tablet Take 15 mg by mouth nightly      budesonide-formoterol (SYMBICORT) 160-4.5 MCG/ACT AERO Inhale 2 puffs into the lungs 2 times daily      acetaminophen (TYLENOL) 325 MG tablet Take 650 mg by mouth every 4 hours as needed for Pain      apixaban (ELIQUIS) 5 MG TABS tablet Take 1 tablet by mouth in the morning and 1 tablet before bedtime. 60 tablet     carbidopa-levodopa (SINEMET CR)  MG per extended release tablet Take 2 tablets by mouth in the morning and 2 tablets at noon and 2 tablets in the evening. rOPINIRole (REQUIP) 1 MG tablet Take 1 tablet by mouth in the morning and 1 tablet at noon and 1 tablet before bedtime. 90 tablet 3    sucralfate (CARAFATE) 1 GM tablet Take 1 tablet by mouth in the morning and 1 tablet at noon and 1 tablet in the evening and 1 tablet before bedtime.  120 tablet 1    aspirin EC 81 MG EC tablet Take 1 tablet by mouth daily 30 tablet 0    montelukast (SINGULAIR) 10 MG tablet Take 10 mg by mouth nightly     MEDICATIONS:   cefTRIAXone (ROCEPHIN) IV  2,000 mg IntraVENous Q24H    insulin glargine  13 Units SubCUTAneous Daily    insulin lispro  0-8 Units SubCUTAneous Q6H       perflutren lipid microspheres    OBJECTIVE:  Vitals:    10/05/22 1530   BP: Pulse: 81   Resp: 29   Temp: 100.2 °F (37.9 °C)   SpO2: 100%     FiO2 : 60 %  O2 Flow Rate (L/min): 3 L/min  O2 Device: Nasal cannula        LABS:  WBC   Date Value Ref Range Status   10/05/2022 9.9 4.5 - 11.5 E9/L Final   10/03/2022 6.6 4.5 - 11.5 E9/L Final   09/07/2022 9.9 4.5 - 11.5 E9/L Final     Hemoglobin   Date Value Ref Range Status   10/05/2022 10.9 (L) 11.5 - 15.5 g/dL Final   10/03/2022 9.4 (L) 11.5 - 15.5 g/dL Final   09/07/2022 11.6 11.5 - 15.5 g/dL Final     Hematocrit   Date Value Ref Range Status   10/05/2022 37.4 34.0 - 48.0 % Final   10/03/2022 31.5 (L) 34.0 - 48.0 % Final   09/07/2022 38.6 34.0 - 48.0 % Final     MCV   Date Value Ref Range Status   10/05/2022 93.7 80.0 - 99.9 fL Final   10/03/2022 92.6 80.0 - 99.9 fL Final   09/07/2022 92.3 80.0 - 99.9 fL Final     Platelets   Date Value Ref Range Status   10/05/2022 193 130 - 450 E9/L Final   10/03/2022 170 130 - 450 E9/L Final   09/07/2022 203 130 - 450 E9/L Final     Sodium   Date Value Ref Range Status   10/05/2022 138 132 - 146 mmol/L Final   10/03/2022 138 132 - 146 mmol/L Final   09/08/2022 142 132 - 146 mmol/L Final     Potassium   Date Value Ref Range Status   09/08/2022 4.0 3.5 - 5.0 mmol/L Final   09/07/2022 4.3 3.5 - 5.0 mmol/L Final   09/07/2022 3.7 3.5 - 5.0 mmol/L Final     Potassium reflex Magnesium   Date Value Ref Range Status   10/05/2022 4.2 3.5 - 5.0 mmol/L Final   10/03/2022 3.8 3.5 - 5.0 mmol/L Final   09/03/2022 3.5 3.5 - 5.0 mmol/L Final     Chloride   Date Value Ref Range Status   10/05/2022 94 (L) 98 - 107 mmol/L Final   10/03/2022 100 98 - 107 mmol/L Final   09/08/2022 98 98 - 107 mmol/L Final     CO2   Date Value Ref Range Status   10/05/2022 34 (H) 22 - 29 mmol/L Final   10/03/2022 31 (H) 22 - 29 mmol/L Final   09/08/2022 35 (H) 22 - 29 mmol/L Final     BUN   Date Value Ref Range Status   10/05/2022 26 (H) 6 - 23 mg/dL Final   10/03/2022 26 (H) 6 - 23 mg/dL Final   09/08/2022 45 (H) 6 - 23 mg/dL Final Creatinine   Date Value Ref Range Status   10/05/2022 1.3 (H) 0.5 - 1.0 mg/dL Final   10/03/2022 1.3 (H) 0.5 - 1.0 mg/dL Final   09/08/2022 1.3 (H) 0.5 - 1.0 mg/dL Final     Glucose   Date Value Ref Range Status   10/05/2022 309 (H) 74 - 99 mg/dL Final   10/03/2022 359 (H) 74 - 99 mg/dL Final   09/08/2022 123 (H) 74 - 99 mg/dL Final     Calcium   Date Value Ref Range Status   10/05/2022 9.1 8.6 - 10.2 mg/dL Final   10/03/2022 8.6 8.6 - 10.2 mg/dL Final   09/08/2022 9.0 8.6 - 10.2 mg/dL Final     Total Protein   Date Value Ref Range Status   10/05/2022 6.7 6.4 - 8.3 g/dL Final   08/25/2022 6.0 (L) 6.4 - 8.3 g/dL Final   08/24/2022 6.7 6.4 - 8.3 g/dL Final     Albumin   Date Value Ref Range Status   10/05/2022 3.7 3.5 - 5.2 g/dL Final   08/25/2022 3.8 3.5 - 5.2 g/dL Final   08/24/2022 4.0 3.5 - 5.2 g/dL Final     Total Bilirubin   Date Value Ref Range Status   10/05/2022 0.7 0.0 - 1.2 mg/dL Final   08/25/2022 0.6 0.0 - 1.2 mg/dL Final   08/24/2022 0.5 0.0 - 1.2 mg/dL Final     Alkaline Phosphatase   Date Value Ref Range Status   10/05/2022 92 35 - 104 U/L Final   08/25/2022 100 35 - 104 U/L Final   08/24/2022 105 (H) 35 - 104 U/L Final     AST   Date Value Ref Range Status   10/05/2022 7 0 - 31 U/L Final   08/25/2022 8 0 - 31 U/L Final   08/24/2022 8 0 - 31 U/L Final     ALT   Date Value Ref Range Status   10/05/2022 5 0 - 32 U/L Final   08/25/2022 <5 0 - 32 U/L Final   08/24/2022 <5 0 - 32 U/L Final     GFR Non-   Date Value Ref Range Status   10/05/2022 40 >=60 mL/min/1.73 Final     Comment:     Chronic Kidney Disease: less than 60 ml/min/1.73 sq.m. Kidney Failure: less than 15 ml/min/1.73 sq.m. Results valid for patients 18 years and older. 10/03/2022 40 >=60 mL/min/1.73 Final     Comment:     Chronic Kidney Disease: less than 60 ml/min/1.73 sq.m. Kidney Failure: less than 15 ml/min/1.73 sq.m. Results valid for patients 18 years and older.      09/08/2022 40 >=60 mL/min/1.73 Final     Comment:     Chronic Kidney Disease: less than 60 ml/min/1.73 sq.m. Kidney Failure: less than 15 ml/min/1.73 sq.m. Results valid for patients 18 years and older. GFR    Date Value Ref Range Status   10/05/2022 49  Final   10/03/2022 49  Final   09/08/2022 49  Final     Magnesium   Date Value Ref Range Status   09/03/2022 2.0 1.6 - 2.6 mg/dL Final   07/27/2022 2.1 1.6 - 2.6 mg/dL Final   07/26/2022 1.9 1.6 - 2.6 mg/dL Final     Phosphorus   Date Value Ref Range Status   07/27/2022 3.9 2.5 - 4.5 mg/dL Final   07/26/2022 4.6 (H) 2.5 - 4.5 mg/dL Final   07/25/2022 3.9 2.5 - 4.5 mg/dL Final     Recent Labs     10/05/22  1248   HCO3 32.6*   BE 4.1*   O2SAT 97.6       RADIOLOGY:  XR CHEST PORTABLE   Final Result   Enlarged cardiac silhouette. Parahilar peribronchial thickening and mild   pulmonary interstitial changes. Right basilar atelectasis and elevated right   hemidiaphragm. PROBLEM LIST:  Active Problems:    * No active hospital problems. *  Resolved Problems:    * No resolved hospital problems. *    CRITICAL CARE PROGRESS NOTE    The patient's case was discussed in multidisciplinary rounds including critical care specialist, nursing, RT and pharmacy. Her evaluation is as follows:       Recent Labs     10/05/22  1119      K 4.2   CL 94*   CO2 34*   BUN 26*   CREATININE 1.3*   GLUCOSE 309*   CALCIUM 9.1       Recent Labs     10/05/22  1119   WBC 9.9   RBC 3.99   HGB 10.9*   HCT 37.4   MCV 93.7   MCH 27.3   MCHC 29.1*   RDW 15.5*      MPV 11.4       No results for input(s): BC in the last 72 hours. No results for input(s): Margarita Sanjiv in the last 72 hours.     24 HR INTAKE/OUTPUT:    Intake/Output Summary (Last 24 hours) at 10/5/2022 2005  Last data filed at 10/5/2022 1327  Gross per 24 hour   Intake 2000 ml   Output --   Net 2000 ml       MEDICATIONS:   insulin lispro  0-8 Units SubCUTAneous Q6H    cefepime  2,000 mg IntraVENous Q12H doxycycline (VIBRAMYCIN) IV  100 mg IntraVENous Q12H    insulin glargine  13 Units SubCUTAneous BID    atorvastatin  20 mg Oral Nightly    Arformoterol Tartrate  15 mcg Nebulization BID    budesonide  0.5 mg Nebulization BID    aspirin  81 mg Oral Daily    heparin (porcine)  5,000 Units SubCUTAneous 3 times per day       perflutren lipid microspheres, LORazepam    OBJECTIVE:  Vitals:    10/05/22 1800   BP:    Pulse: 88   Resp: 26   Temp: 100.1 °F (37.8 °C)   SpO2: 100%     FiO2 : 60 %  O2 Flow Rate (L/min): 3 L/min  O2 Device: PAP (positive airway pressure)        LABS:  WBC   Date Value Ref Range Status   10/05/2022 9.9 4.5 - 11.5 E9/L Final   10/03/2022 6.6 4.5 - 11.5 E9/L Final   09/07/2022 9.9 4.5 - 11.5 E9/L Final     Hemoglobin   Date Value Ref Range Status   10/05/2022 10.9 (L) 11.5 - 15.5 g/dL Final   10/03/2022 9.4 (L) 11.5 - 15.5 g/dL Final   09/07/2022 11.6 11.5 - 15.5 g/dL Final     Hematocrit   Date Value Ref Range Status   10/05/2022 37.4 34.0 - 48.0 % Final   10/03/2022 31.5 (L) 34.0 - 48.0 % Final   09/07/2022 38.6 34.0 - 48.0 % Final     MCV   Date Value Ref Range Status   10/05/2022 93.7 80.0 - 99.9 fL Final   10/03/2022 92.6 80.0 - 99.9 fL Final   09/07/2022 92.3 80.0 - 99.9 fL Final     Platelets   Date Value Ref Range Status   10/05/2022 193 130 - 450 E9/L Final   10/03/2022 170 130 - 450 E9/L Final   09/07/2022 203 130 - 450 E9/L Final     Sodium   Date Value Ref Range Status   10/05/2022 138 132 - 146 mmol/L Final   10/03/2022 138 132 - 146 mmol/L Final   09/08/2022 142 132 - 146 mmol/L Final     Potassium   Date Value Ref Range Status   09/08/2022 4.0 3.5 - 5.0 mmol/L Final   09/07/2022 4.3 3.5 - 5.0 mmol/L Final   09/07/2022 3.7 3.5 - 5.0 mmol/L Final     Potassium reflex Magnesium   Date Value Ref Range Status   10/05/2022 4.2 3.5 - 5.0 mmol/L Final   10/03/2022 3.8 3.5 - 5.0 mmol/L Final   09/03/2022 3.5 3.5 - 5.0 mmol/L Final     Chloride   Date Value Ref Range Status   10/05/2022 94 (L) 98 - 107 mmol/L Final   10/03/2022 100 98 - 107 mmol/L Final   09/08/2022 98 98 - 107 mmol/L Final     CO2   Date Value Ref Range Status   10/05/2022 34 (H) 22 - 29 mmol/L Final   10/03/2022 31 (H) 22 - 29 mmol/L Final   09/08/2022 35 (H) 22 - 29 mmol/L Final     BUN   Date Value Ref Range Status   10/05/2022 26 (H) 6 - 23 mg/dL Final   10/03/2022 26 (H) 6 - 23 mg/dL Final   09/08/2022 45 (H) 6 - 23 mg/dL Final     Creatinine   Date Value Ref Range Status   10/05/2022 1.3 (H) 0.5 - 1.0 mg/dL Final   10/03/2022 1.3 (H) 0.5 - 1.0 mg/dL Final   09/08/2022 1.3 (H) 0.5 - 1.0 mg/dL Final     Glucose   Date Value Ref Range Status   10/05/2022 309 (H) 74 - 99 mg/dL Final   10/03/2022 359 (H) 74 - 99 mg/dL Final   09/08/2022 123 (H) 74 - 99 mg/dL Final     Calcium   Date Value Ref Range Status   10/05/2022 9.1 8.6 - 10.2 mg/dL Final   10/03/2022 8.6 8.6 - 10.2 mg/dL Final   09/08/2022 9.0 8.6 - 10.2 mg/dL Final     Total Protein   Date Value Ref Range Status   10/05/2022 6.7 6.4 - 8.3 g/dL Final   08/25/2022 6.0 (L) 6.4 - 8.3 g/dL Final   08/24/2022 6.7 6.4 - 8.3 g/dL Final     Albumin   Date Value Ref Range Status   10/05/2022 3.7 3.5 - 5.2 g/dL Final   08/25/2022 3.8 3.5 - 5.2 g/dL Final   08/24/2022 4.0 3.5 - 5.2 g/dL Final     Total Bilirubin   Date Value Ref Range Status   10/05/2022 0.7 0.0 - 1.2 mg/dL Final   08/25/2022 0.6 0.0 - 1.2 mg/dL Final   08/24/2022 0.5 0.0 - 1.2 mg/dL Final     Alkaline Phosphatase   Date Value Ref Range Status   10/05/2022 92 35 - 104 U/L Final   08/25/2022 100 35 - 104 U/L Final   08/24/2022 105 (H) 35 - 104 U/L Final     AST   Date Value Ref Range Status   10/05/2022 7 0 - 31 U/L Final   08/25/2022 8 0 - 31 U/L Final   08/24/2022 8 0 - 31 U/L Final     ALT   Date Value Ref Range Status   10/05/2022 5 0 - 32 U/L Final   08/25/2022 <5 0 - 32 U/L Final   08/24/2022 <5 0 - 32 U/L Final     GFR Non-   Date Value Ref Range Status   10/05/2022 40 >=60 mL/min/1.73 Final Comment:     Chronic Kidney Disease: less than 60 ml/min/1.73 sq.m. Kidney Failure: less than 15 ml/min/1.73 sq.m. Results valid for patients 18 years and older. 10/03/2022 40 >=60 mL/min/1.73 Final     Comment:     Chronic Kidney Disease: less than 60 ml/min/1.73 sq.m. Kidney Failure: less than 15 ml/min/1.73 sq.m. Results valid for patients 18 years and older. 09/08/2022 40 >=60 mL/min/1.73 Final     Comment:     Chronic Kidney Disease: less than 60 ml/min/1.73 sq.m. Kidney Failure: less than 15 ml/min/1.73 sq.m. Results valid for patients 18 years and older. GFR    Date Value Ref Range Status   10/05/2022 49  Final   10/03/2022 49  Final   09/08/2022 49  Final     Magnesium   Date Value Ref Range Status   09/03/2022 2.0 1.6 - 2.6 mg/dL Final   07/27/2022 2.1 1.6 - 2.6 mg/dL Final   07/26/2022 1.9 1.6 - 2.6 mg/dL Final     Phosphorus   Date Value Ref Range Status   07/27/2022 3.9 2.5 - 4.5 mg/dL Final   07/26/2022 4.6 (H) 2.5 - 4.5 mg/dL Final   07/25/2022 3.9 2.5 - 4.5 mg/dL Final     Recent Labs     10/05/22  1754   PH 7.454*   PO2 252.2*   PCO2 47.7*   HCO3 32.7*   BE 7.8*   O2SAT 98.7*     RADIOLOGY:  XR CHEST PORTABLE   Final Result   Enlarged cardiac silhouette. Parahilar peribronchial thickening and mild   pulmonary interstitial changes. Right basilar atelectasis and elevated right   hemidiaphragm. PROBLEM LIST:  Principal Problem:    Sepsis ([address]  Resolved Problems:    * No resolved hospital problems. *      ATTESTATION:  ICU Staff Physician note of personal involvement in Care  As the attending physician, I certify that I personally reviewed the patients history and personnally examined the patient to confirm the physical findings described above,  And that I reviewed the relevant imaging studies and available reports.   I also discussed the differential diagnosis and all of the proposed management plans with the patient and individuals accompanying the patient to this visit. They had the opportunity to ask questions about the proposed management plans and to have those questions answered. This patient has a high probability of sudden, clinically significant deterioration, which requires the highest level of physician preparedness to intervene urgently. I managed/supervised life or organ supporting interventions that required frequent physician assessment. I devoted my full attention to the direct care of this patient for the amount of time indicated below. Time I spent with the family or surrogate(s) is included only if the patient was incapable of providing the necessary information or participating in medical decisions - Time devoted to teaching and to any procedures I billed separately is not included.      CRITICAL CARE TIME:  30 minutes    Chuy Pierre MD  Pulmonary and Critical Care Medicine

## 2022-10-06 ENCOUNTER — APPOINTMENT (OUTPATIENT)
Dept: CT IMAGING | Age: 72
DRG: 871 | End: 2022-10-06
Payer: MEDICARE

## 2022-10-06 ENCOUNTER — APPOINTMENT (OUTPATIENT)
Dept: GENERAL RADIOLOGY | Age: 72
DRG: 871 | End: 2022-10-06
Payer: MEDICARE

## 2022-10-06 LAB
ALBUMIN SERPL-MCNC: 3 G/DL (ref 3.5–5.2)
ALP BLD-CCNC: 68 U/L (ref 35–104)
ALT SERPL-CCNC: 5 U/L (ref 0–32)
ANION GAP SERPL CALCULATED.3IONS-SCNC: 6 MMOL/L (ref 7–16)
AST SERPL-CCNC: 8 U/L (ref 0–31)
ATYPICAL LYMPHOCYTE RELATIVE PERCENT: 1.8 % (ref 0–4)
B.E.: 5.4 MMOL/L (ref -3–3)
BASOPHILS ABSOLUTE: 0 E9/L (ref 0–0.2)
BASOPHILS RELATIVE PERCENT: 0.4 % (ref 0–2)
BILIRUB SERPL-MCNC: 0.5 MG/DL (ref 0–1.2)
BUN BLDV-MCNC: 25 MG/DL (ref 6–23)
CALCIUM SERPL-MCNC: 8.4 MG/DL (ref 8.6–10.2)
CHLORIDE BLD-SCNC: 103 MMOL/L (ref 98–107)
CO2: 33 MMOL/L (ref 22–29)
CREAT SERPL-MCNC: 1.1 MG/DL (ref 0.5–1)
DEVICE: ABNORMAL
DIGOXIN LEVEL: 1.6 NG/ML (ref 0.8–2)
EOSINOPHILS ABSOLUTE: 0 E9/L (ref 0.05–0.5)
EOSINOPHILS RELATIVE PERCENT: 0.7 % (ref 0–6)
FIO2: 60
GFR AFRICAN AMERICAN: 59
GFR NON-AFRICAN AMERICAN: 49 ML/MIN/1.73
GLUCOSE BLD-MCNC: 134 MG/DL (ref 74–99)
HCO3: 31.8 MMOL/L (ref 22–26)
HCT VFR BLD CALC: 32.5 % (ref 34–48)
HEMOGLOBIN: 9.3 G/DL (ref 11.5–15.5)
HYPOCHROMIA: ABNORMAL
LYMPHOCYTES ABSOLUTE: 0.83 E9/L (ref 1.5–4)
LYMPHOCYTES RELATIVE PERCENT: 13.2 % (ref 20–42)
MAGNESIUM: 1.8 MG/DL (ref 1.6–2.6)
MCH RBC QN AUTO: 26.7 PG (ref 26–35)
MCHC RBC AUTO-ENTMCNC: 28.6 % (ref 32–34.5)
MCV RBC AUTO: 93.4 FL (ref 80–99.9)
METER GLUCOSE: 142 MG/DL (ref 74–99)
METER GLUCOSE: 154 MG/DL (ref 74–99)
METER GLUCOSE: 221 MG/DL (ref 74–99)
METER GLUCOSE: 255 MG/DL (ref 74–99)
MODE: ABNORMAL
MONOCYTES ABSOLUTE: 0.33 E9/L (ref 0.1–0.95)
MONOCYTES RELATIVE PERCENT: 6.1 % (ref 2–12)
MYELOCYTE PERCENT: 0.9 % (ref 0–0)
NEUTROPHILS ABSOLUTE: 4.35 E9/L (ref 1.8–7.3)
NEUTROPHILS RELATIVE PERCENT: 78.1 % (ref 43–80)
NUCLEATED RED BLOOD CELLS: 0.9 /100 WBC
O2 SATURATION: 99.4 % (ref 92–98.5)
OPERATOR ID: 1632
OVALOCYTES: ABNORMAL
PCO2 37: 55.3 MMHG (ref 35–45)
PDW BLD-RTO: 15.4 FL (ref 11.5–15)
PH 37: 7.37 (ref 7.35–7.45)
PHOSPHORUS: 2.6 MG/DL (ref 2.5–4.5)
PLATELET # BLD: 134 E9/L (ref 130–450)
PMV BLD AUTO: 11 FL (ref 7–12)
PO2 37: 164.5 MMHG (ref 60–80)
POC SOURCE: ABNORMAL
POIKILOCYTES: ABNORMAL
POLYCHROMASIA: ABNORMAL
POTASSIUM SERPL-SCNC: 4 MMOL/L (ref 3.5–5)
RBC # BLD: 3.48 E12/L (ref 3.5–5.5)
SODIUM BLD-SCNC: 142 MMOL/L (ref 132–146)
TOTAL PROTEIN: 5.2 G/DL (ref 6.4–8.3)
WBC # BLD: 5.5 E9/L (ref 4.5–11.5)

## 2022-10-06 PROCEDURE — 85025 COMPLETE CBC W/AUTO DIFF WBC: CPT

## 2022-10-06 PROCEDURE — 92526 ORAL FUNCTION THERAPY: CPT | Performed by: SPEECH-LANGUAGE PATHOLOGIST

## 2022-10-06 PROCEDURE — 80053 COMPREHEN METABOLIC PANEL: CPT

## 2022-10-06 PROCEDURE — 2500000003 HC RX 250 WO HCPCS: Performed by: INTERNAL MEDICINE

## 2022-10-06 PROCEDURE — 2580000003 HC RX 258: Performed by: INTERNAL MEDICINE

## 2022-10-06 PROCEDURE — 6360000002 HC RX W HCPCS

## 2022-10-06 PROCEDURE — 2700000000 HC OXYGEN THERAPY PER DAY

## 2022-10-06 PROCEDURE — 6360000004 HC RX CONTRAST MEDICATION: Performed by: RADIOLOGY

## 2022-10-06 PROCEDURE — 99222 1ST HOSP IP/OBS MODERATE 55: CPT | Performed by: OTOLARYNGOLOGY

## 2022-10-06 PROCEDURE — 82803 BLOOD GASES ANY COMBINATION: CPT

## 2022-10-06 PROCEDURE — 82962 GLUCOSE BLOOD TEST: CPT

## 2022-10-06 PROCEDURE — 94660 CPAP INITIATION&MGMT: CPT

## 2022-10-06 PROCEDURE — 6370000000 HC RX 637 (ALT 250 FOR IP)

## 2022-10-06 PROCEDURE — 2580000003 HC RX 258

## 2022-10-06 PROCEDURE — 6360000002 HC RX W HCPCS: Performed by: INTERNAL MEDICINE

## 2022-10-06 PROCEDURE — 71045 X-RAY EXAM CHEST 1 VIEW: CPT

## 2022-10-06 PROCEDURE — 87040 BLOOD CULTURE FOR BACTERIA: CPT

## 2022-10-06 PROCEDURE — 92610 EVALUATE SWALLOWING FUNCTION: CPT | Performed by: SPEECH-LANGUAGE PATHOLOGIST

## 2022-10-06 PROCEDURE — 83735 ASSAY OF MAGNESIUM: CPT

## 2022-10-06 PROCEDURE — 84100 ASSAY OF PHOSPHORUS: CPT

## 2022-10-06 PROCEDURE — 6370000000 HC RX 637 (ALT 250 FOR IP): Performed by: INTERNAL MEDICINE

## 2022-10-06 PROCEDURE — 2000000000 HC ICU R&B

## 2022-10-06 PROCEDURE — 94640 AIRWAY INHALATION TREATMENT: CPT

## 2022-10-06 PROCEDURE — 80162 ASSAY OF DIGOXIN TOTAL: CPT

## 2022-10-06 PROCEDURE — 74177 CT ABD & PELVIS W/CONTRAST: CPT

## 2022-10-06 RX ORDER — INSULIN LISPRO 100 [IU]/ML
0-8 INJECTION, SOLUTION INTRAVENOUS; SUBCUTANEOUS
Status: DISCONTINUED | OUTPATIENT
Start: 2022-10-06 | End: 2022-10-10 | Stop reason: HOSPADM

## 2022-10-06 RX ORDER — 0.9 % SODIUM CHLORIDE 0.9 %
500 INTRAVENOUS SOLUTION INTRAVENOUS ONCE
Status: COMPLETED | OUTPATIENT
Start: 2022-10-06 | End: 2022-10-06

## 2022-10-06 RX ORDER — DEXTROSE MONOHYDRATE 100 MG/ML
INJECTION, SOLUTION INTRAVENOUS CONTINUOUS PRN
Status: DISCONTINUED | OUTPATIENT
Start: 2022-10-06 | End: 2022-10-10 | Stop reason: HOSPADM

## 2022-10-06 RX ORDER — NOREPINEPHRINE BIT/0.9 % NACL 16MG/250ML
1-100 INFUSION BOTTLE (ML) INTRAVENOUS CONTINUOUS
Status: DISCONTINUED | OUTPATIENT
Start: 2022-10-06 | End: 2022-10-08

## 2022-10-06 RX ORDER — SODIUM CHLORIDE, SODIUM LACTATE, POTASSIUM CHLORIDE, CALCIUM CHLORIDE 600; 310; 30; 20 MG/100ML; MG/100ML; MG/100ML; MG/100ML
INJECTION, SOLUTION INTRAVENOUS CONTINUOUS
Status: DISCONTINUED | OUTPATIENT
Start: 2022-10-06 | End: 2022-10-09

## 2022-10-06 RX ADMIN — INSULIN GLARGINE 13 UNITS: 100 INJECTION, SOLUTION SUBCUTANEOUS at 20:25

## 2022-10-06 RX ADMIN — INSULIN LISPRO 4 UNITS: 100 INJECTION, SOLUTION INTRAVENOUS; SUBCUTANEOUS at 20:26

## 2022-10-06 RX ADMIN — APIXABAN 5 MG: 5 TABLET, FILM COATED ORAL at 20:19

## 2022-10-06 RX ADMIN — SODIUM CHLORIDE 500 ML: 9 INJECTION, SOLUTION INTRAVENOUS at 00:10

## 2022-10-06 RX ADMIN — ARFORMOTEROL TARTRATE 15 MCG: 15 SOLUTION RESPIRATORY (INHALATION) at 05:06

## 2022-10-06 RX ADMIN — SODIUM CHLORIDE, POTASSIUM CHLORIDE, SODIUM LACTATE AND CALCIUM CHLORIDE: 600; 310; 30; 20 INJECTION, SOLUTION INTRAVENOUS at 01:21

## 2022-10-06 RX ADMIN — VANCOMYCIN HYDROCHLORIDE 750 MG: 5 INJECTION, POWDER, LYOPHILIZED, FOR SOLUTION INTRAVENOUS at 09:55

## 2022-10-06 RX ADMIN — ACETAMINOPHEN 650 MG: 325 TABLET ORAL at 10:33

## 2022-10-06 RX ADMIN — BUDESONIDE 500 MCG: 0.5 SUSPENSION RESPIRATORY (INHALATION) at 17:25

## 2022-10-06 RX ADMIN — VANCOMYCIN HYDROCHLORIDE 750 MG: 5 INJECTION, POWDER, LYOPHILIZED, FOR SOLUTION INTRAVENOUS at 20:16

## 2022-10-06 RX ADMIN — HEPARIN SODIUM 5000 UNITS: 5000 INJECTION INTRAVENOUS; SUBCUTANEOUS at 06:30

## 2022-10-06 RX ADMIN — HEPARIN SODIUM 5000 UNITS: 5000 INJECTION INTRAVENOUS; SUBCUTANEOUS at 14:16

## 2022-10-06 RX ADMIN — ARFORMOTEROL TARTRATE 15 MCG: 15 SOLUTION RESPIRATORY (INHALATION) at 17:25

## 2022-10-06 RX ADMIN — IOPAMIDOL 75 ML: 755 INJECTION, SOLUTION INTRAVENOUS at 11:46

## 2022-10-06 RX ADMIN — ACETAMINOPHEN 650 MG: 325 TABLET ORAL at 18:08

## 2022-10-06 RX ADMIN — INSULIN LISPRO 2 UNITS: 100 INJECTION, SOLUTION INTRAVENOUS; SUBCUTANEOUS at 16:45

## 2022-10-06 RX ADMIN — ATORVASTATIN CALCIUM 20 MG: 20 TABLET, FILM COATED ORAL at 20:19

## 2022-10-06 RX ADMIN — BUDESONIDE 500 MCG: 0.5 SUSPENSION RESPIRATORY (INHALATION) at 05:06

## 2022-10-06 RX ADMIN — CEFEPIME 2000 MG: 2 INJECTION, POWDER, FOR SOLUTION INTRAVENOUS at 09:02

## 2022-10-06 RX ADMIN — CEFEPIME 2000 MG: 2 INJECTION, POWDER, FOR SOLUTION INTRAVENOUS at 20:17

## 2022-10-06 RX ADMIN — DOXYCYCLINE 100 MG: 100 INJECTION, POWDER, LYOPHILIZED, FOR SOLUTION INTRAVENOUS at 05:30

## 2022-10-06 ASSESSMENT — PAIN SCALES - GENERAL
PAINLEVEL_OUTOF10: 0

## 2022-10-06 ASSESSMENT — ENCOUNTER SYMPTOMS
SHORTNESS OF BREATH: 1
COUGH: 1

## 2022-10-06 NOTE — CONSULTS
OTOLARYNGOLOGY  CONSULT NOTE  10/5/2022    Physician Consulted: Dr. Aman Tinsley  Reason for Consult: hoarseness   Referring Physician: Dr. Latrell Carver     HPI  Nikia Obregon is a 66 y.o. female who ENT was consulted for evaluation of hoarseness. Per daughter request, ENT was consulted as the patient has been hoarse since she was intubated in July. Pt has been at a nursing home on 2L of O2 with a history of obesity hypoventilation syndrome, CHF, a fib, SERENA. She was in the ED two days ago, discharged but came back today with SOB. Diagnosed with Acute hypoxemic and hypercapnic respiratory failure secondary to acute diastolic dysfunction and possibly pneumonia in the setting of SERENA, SHANTANU, and sepsis due to pyelonephritis. Pt admitted to ICU. Pt put on ABX, duonebs, and BiPAP novernight. No stridor or resp distress. Review of Systems   Respiratory:  Positive for shortness of breath.       Past Medical History:   Diagnosis Date    A-fib [facility])     Acute on chronic congestive heart failure (HCC)     Anxiety     Asthma     CAD (coronary artery disease) 01/21/2016    Cancer ([facility] [address]  breast ca 2006    right lumpectomy    Chronic kidney disease     nephrolithiasis    Depression     Diabetes mellitus ([facility] [address]     H/O mammogram     Hx MRSA infection     toe infection january 2012    Hyperlipidemia     Hypertension     Lateral epicondylitis     SERENA on CPAP     Parkinson's disease ([facility] [address]     Tubal ligation status        Past Surgical History:   Procedure Laterality Date    BREAST LUMPECTOMY      BREAST REDUCTION SURGERY      CARDIAC CATHETERIZATION  4/28/2014    Dr. Cora Hogan  01/11/2022    Dr. Nieves Delaney  7/29/15    CT GUIDED CHEST TUBE  7/8/2022    CT GUIDED CHEST TUBE 7/8/2022 MD HALLEY Chase CT    ENDOSCOPY, COLON, DIAGNOSTIC  7/19/15    GALLBLADDER SURGERY      LUMBAR LAMINECTOMY      TOE AMPUTATION      TONSILLECTOMY      UPPER GASTROINTESTINAL ENDOSCOPY UPPER GASTROINTESTINAL ENDOSCOPY N/A 7/19/2022    EGD ESOPHAGOGASTRODUODENOSCOPY performed by Lauren Velasquez MD at [facility]       Medications Prior to Admission:    Prior to Admission medications    Medication Sig Start Date End Date Taking? Authorizing Provider   bumetanide (BUMEX) 1 MG tablet Take 0.5 mg by mouth 2 times daily   Yes Historical Provider, MD   zolpidem (AMBIEN) 5 MG tablet Take 5 mg by mouth nightly. Yes Historical Provider, MD   OXYGEN Inhale 2 L into the lungs continuous   Yes Historical Provider, MD   metoprolol succinate (TOPROL XL) 25 MG extended release tablet Take 3 tablets by mouth in the morning and at bedtime 9/8/22 10/8/22  Nicola Cornejo MD   digoxin [facility]) 125 MCG tablet Take 1 tablet by mouth daily 9/9/22   Nicola Cornejo MD   docusate sodium (COLACE, DULCOLAX) 100 MG CAPS Take 100 mg by mouth 2 times daily as needed for Constipation 9/8/22   Nicola Cornejo MD   polyethylene glycol (GLYCOLAX) 17 g packet Take 17 g by mouth daily as needed for Constipation 9/8/22 10/8/22  Nicola Cornejo MD   insulin glargine (LANTUS) 100 UNIT/ML injection vial Inject 13 Units into the skin 2 times daily 9/8/22   Nicola Cornejo MD   insulin lispro (HUMALOG) 100 UNIT/ML SOLN injection vial Inject 3 Units into the skin 3 times daily (with meals) 9/8/22   Nicola Cornejo MD   atorvastatin (LIPITOR) 20 MG tablet Take 20 mg by mouth nightly    Historical Provider, MD   benzoin compound solution Apply 1 Applicatorful topically nightly Apply topically to right toe    Historical Provider, MD   ipratropium-albuterol (DUONEB) 0.5-2.5 (3) MG/3ML SOLN nebulizer solution Inhale 1 vial into the lungs 4 times daily    Historical Provider, MD   LORazepam (ATIVAN) 0.5 MG tablet Take 0.5 mg by mouth every 12 hours as needed for Anxiety.     Historical Provider, MD   miconazole (MICOTIN) 2 % powder Apply 1 each topically 2 times daily Apply topically under breasts twice daily    Historical Provider, MD   insulin aspart (NOVOLOG) 100 UNIT/ML injection vial Inject 0-10 Units into the skin 3 times daily (before meals) Blood Glucose 140 - 199 = 1 Unit  Blood Glucose 200 - 249 = 2 Units  Blood Glucose 250 - 299 = 4 Units  Blood Glucose 300 - 349 = 6 Units  Blood Glucose 350 - 399 = 8 Units  Blood Glucose 400+  = 10 Units    Historical Provider, MD   pantoprazole (PROTONIX) 40 MG tablet Take 40 mg by mouth every morning    Historical Provider, MD   promethazine (PHENERGAN) 25 MG tablet Take 25 mg by mouth every 6 hours as needed for Nausea    Historical Provider, MD   mirtazapine (REMERON) 15 MG tablet Take 15 mg by mouth nightly    Historical Provider, MD   budesonide-formoterol (SYMBICORT) 160-4.5 MCG/ACT AERO Inhale 2 puffs into the lungs 2 times daily    Historical Provider, MD   acetaminophen (TYLENOL) 325 MG tablet Take 650 mg by mouth every 4 hours as needed for Pain or Fever    Historical Provider, MD   metoprolol tartrate (LOPRESSOR) 25 MG tablet Take 0.5 tablets by mouth in the morning and 0.5 tablets before bedtime. 7/27/22 9/8/22  Ashia Chaudhry MD   apixaban (ELIQUIS) 5 MG TABS tablet Take 1 tablet by mouth in the morning and 1 tablet before bedtime. 7/23/22   Ashia Chaudhry MD   carbidopa-levodopa (SINEMET CR)  MG per extended release tablet Take 2 tablets by mouth in the morning and 2 tablets at noon and 2 tablets in the evening. 7/23/22   Ashia Chaudhry MD   rOPINIRole (REQUIP) 1 MG tablet Take 1 tablet by mouth in the morning and 1 tablet at noon and 1 tablet before bedtime.  7/23/22   Ashia Chaudhry MD   amiodarone (CORDARONE) 200 MG tablet Take 1 tablet by mouth in the morning. 7/24/22 9/8/22  Ashia Chaudhry MD   budesonide (PULMICORT) 0.5 MG/2ML nebulizer suspension Take 2 mLs by nebulization in the morning and 2 mLs in the evening. 7/23/22 9/8/22  Ashia Chaudhry MD   insulin glargine-yfgn [facility]) 100 UNIT/ML injection vial Inject 5 Units into the skin nightly 7/23/22 9/8/22  Carolyn Spain MD   QUEtiapine (SEROQUEL) 25 MG tablet Take 1 tablet by mouth in the morning and 1 tablet before bedtime. 7/23/22 9/8/22  Carolyn Spain MD   sucralfate (CARAFATE) 1 GM tablet Take 1 tablet by mouth in the morning and 1 tablet at noon and 1 tablet in the evening and 1 tablet before bedtime. 7/20/22   Leyla Gaines DO   divalproex (DEPAKOTE) 250 MG DR tablet Take 250 mg by mouth 2 times daily  7/23/22  Historical Provider, MD   ferrous sulfate (IRON 325) 325 (65 Fe) MG tablet Take 325 mg by mouth daily (with breakfast)  Patient not taking: Reported on 7/7/2022 7/23/22  Historical Provider, MD   aspirin EC 81 MG EC tablet Take 1 tablet by mouth daily 5/17/22   Charity Rinne, MD   montelukast (SINGULAIR) 10 MG tablet Take 10 mg by mouth nightly    Historical Provider, MD       Allergies   Allergen Reactions    Ciprofloxacin Nausea And Vomiting    Codeine Itching     Creepy crawly \" feelings       Family History   Problem Relation Age of Onset    Cancer Mother         Lung Ca    Cancer Father         lung Ca    Diabetes Sister     Heart Disease Sister     Seizures Son     Other Brother         sepsis       Social History     Tobacco Use    Smoking status: Never    Smokeless tobacco: Never   Vaping Use    Vaping Use: Never used   Substance Use Topics    Alcohol use: No    Drug use: No           PHYSICAL EXAM:    Vitals:    10/05/22 2151   BP:    Pulse:    Resp: 16   Temp:    SpO2:        General Appearance:  Laying in bed, awake, alert, no apparent distress  Head/face:  NC/AT  Eyes: PERRL, EOMI  ENT: Bilateral external ears WNL, Nares patent, Septum midline, edentulous, no oral lesions  Neck:Supple, no adenopathy  Lungs:  Non-labored, good respiratory effort, no stridor  Heart:  RR  Neuro: Facial nerve symmetric and intact. House Brackmann 1/6, bilaterally.        LABS:  CBC  Recent Labs     10/05/22  1119   WBC 9.9   HGB 10.9*   HCT 37.4    RADIOLOGY  XR CHEST PORTABLE    Result Date: 10/5/2022  EXAMINATION: ONE XRAY VIEW OF THE CHEST 10/5/2022 11:37 am COMPARISON: 10/03/2022. HISTORY: ORDERING SYSTEM PROVIDED HISTORY: SOB TECHNOLOGIST PROVIDED HISTORY: Reason for exam:->SOB FINDINGS: The cardiac silhouette is enlarged. There is parahilar peribronchial thickening. The lungs demonstrate mild diffuse interstitial changes. There is atelectasis in the right lung base and the right hemidiaphragm is elevated. No pneumothorax or definite pleural effusion is seen. Enlarged cardiac silhouette. Parahilar peribronchial thickening and mild pulmonary interstitial changes. Right basilar atelectasis and elevated right hemidiaphragm. Endoscopy Procedure Note    Pre-operative Diagnosis: hoarseness  Post-operative Diagnosis: same  Indications: Hoarseness, dysphagia or aspiration - not able to be clearly evaluated by indirect laryngoscopy  Endoscopy Type:  Flexible nasopharyngolaryngoscopy  Procedure Details   The flexible nasopharyngolaryngoscope was passed through the left side(s) of the nose, and the nose, nasopharynx, oropharynx, hypopharynx and larynx were examined. Examination was performed during quiet respiration and with phonation. The following findings were noted. Findings:  Normal nasopharynx, normal epiglottis, normal tongue base, normal pyriform, normal TVC motion and mucosa, no subglottic masses or lesions.  Left cord sluggish with mild arytenoid hooding, cords adduct, no masses or lesions, airway patent  Condition:  Stable  Complications:  None                  ASSESSMENT:  66 y.o. female with hoarseness possibly secondary to deconditioning     PLAN:  Scope with no masses or lesions, left cord sluggish but adducts  No acute ENT intervention  Recommend speech therapy as outpatient  Will discuss with Dr. Kellee Menchaca     Electronically signed by Gabriela Harper DO on 10/5/22 at 10:12 PM EDT

## 2022-10-06 NOTE — H&P
HISTORY AND PHYSICAL             Date: 10/6/2022        Patient Name: Torie Benson     YOB: 1950      Age:  66 y.o. Chief Complaint     Chief Complaint   Patient presents with    Other     Hypoxemia on RA ranging between 80s and 90s per EMS increased lethargy.  by ambulance)           History Obtained From   patient, electronic medical record    History of Present Illness     65-year-old female presents emergency department complaint shortness of breath been short of breath approximately the past 2 days. Elicits been started on new medication possibly Ambien she is unsure. She otherwise denies any chest pain fever chills denies any changes to bowel bladder habits no other acute complaints at this time. Was previously on oxygen however recently has not been needing it until the past 2 days has been on 3 L at a nursing facility.  Admitted for possible pneumonia, sepsis and respiratory failure  Past Medical History     Past Medical History:   Diagnosis Date    A-fib ([facility] [address]     Acute on chronic congestive heart failure (HCC)     Anxiety     Asthma     CAD (coronary artery disease) 01/21/2016    Cancer ([facility] [address]  breast ca 2006    right lumpectomy    Chronic kidney disease     nephrolithiasis    Depression     Diabetes mellitus ([address]     H/O mammogram     Hx MRSA infection     toe infection january 2012    Hyperlipidemia     Hypertension     Lateral epicondylitis     SERENA on CPAP     Parkinson's disease ([facility] [address]     Tubal ligation status         Past Surgical History     Past Surgical History:   Procedure Laterality Date    BREAST LUMPECTOMY      BREAST REDUCTION SURGERY      CARDIAC CATHETERIZATION  4/28/2014    Dr. Farhat Aguilar  01/11/2022    Dr. Claudette Felling  7/29/15    CT GUIDED CHEST TUBE  7/8/2022    CT GUIDED CHEST TUBE 7/8/2022 Marcie Rees MD [facility]    ENDOSCOPY, COLON, DIAGNOSTIC  7/19/15    GALLBLADDER SURGERY      LUMBAR LAMINECTOMY TOE AMPUTATION      TONSILLECTOMY      UPPER GASTROINTESTINAL ENDOSCOPY      UPPER GASTROINTESTINAL ENDOSCOPY N/A 7/19/2022    EGD ESOPHAGOGASTRODUODENOSCOPY performed by Nikkie Kim MD at [address]        Medications Prior to Admission     Prior to Admission medications    Medication Sig Start Date End Date Taking? Authorizing Provider   bumetanide (BUMEX) 1 MG tablet Take 0.5 mg by mouth 2 times daily   Yes Historical Provider, MD   zolpidem (AMBIEN) 5 MG tablet Take 5 mg by mouth nightly. Yes Historical Provider, MD   OXYGEN Inhale 2 L into the lungs continuous   Yes Historical Provider, MD   metoprolol succinate (TOPROL XL) 25 MG extended release tablet Take 3 tablets by mouth in the morning and at bedtime 9/8/22 10/8/22  Stan Almonte MD   digoxin [facility]) 125 MCG tablet Take 1 tablet by mouth daily 9/9/22   Stan Almonte MD   docusate sodium (COLACE, DULCOLAX) 100 MG CAPS Take 100 mg by mouth 2 times daily as needed for Constipation 9/8/22   Stan Almonte MD   polyethylene glycol (GLYCOLAX) 17 g packet Take 17 g by mouth daily as needed for Constipation 9/8/22 10/8/22  Stan Almonte MD   insulin glargine (LANTUS) 100 UNIT/ML injection vial Inject 13 Units into the skin 2 times daily 9/8/22   Stan Almonte MD   insulin lispro (HUMALOG) 100 UNIT/ML SOLN injection vial Inject 3 Units into the skin 3 times daily (with meals) 9/8/22   Stan Almonte MD   atorvastatin (LIPITOR) 20 MG tablet Take 20 mg by mouth nightly    Historical Provider, MD   benzoin compound solution Apply 1 Applicatorful topically nightly Apply topically to right toe    Historical Provider, MD   ipratropium-albuterol (DUONEB) 0.5-2.5 (3) MG/3ML SOLN nebulizer solution Inhale 1 vial into the lungs 4 times daily    Historical Provider, MD   LORazepam (ATIVAN) 0.5 MG tablet Take 0.5 mg by mouth every 12 hours as needed for Anxiety.     Historical Provider, MD   miconazole (MICOTIN) 2 % powder Apply 1 each topically 2 times daily Apply topically under breasts twice daily    Historical Provider, MD   insulin aspart (NOVOLOG) 100 UNIT/ML injection vial Inject 0-10 Units into the skin 3 times daily (before meals) Blood Glucose 140 - 199 = 1 Unit  Blood Glucose 200 - 249 = 2 Units  Blood Glucose 250 - 299 = 4 Units  Blood Glucose 300 - 349 = 6 Units  Blood Glucose 350 - 399 = 8 Units  Blood Glucose 400+  = 10 Units    Historical Provider, MD   pantoprazole (PROTONIX) 40 MG tablet Take 40 mg by mouth every morning    Historical Provider, MD   promethazine (PHENERGAN) 25 MG tablet Take 25 mg by mouth every 6 hours as needed for Nausea    Historical Provider, MD   mirtazapine (REMERON) 15 MG tablet Take 15 mg by mouth nightly    Historical Provider, MD   budesonide-formoterol (SYMBICORT) 160-4.5 MCG/ACT AERO Inhale 2 puffs into the lungs 2 times daily    Historical Provider, MD   acetaminophen (TYLENOL) 325 MG tablet Take 650 mg by mouth every 4 hours as needed for Pain or Fever    Historical Provider, MD   metoprolol tartrate (LOPRESSOR) 25 MG tablet Take 0.5 tablets by mouth in the morning and 0.5 tablets before bedtime. 7/27/22 9/8/22  Miguel Gibbons MD   apixaban (ELIQUIS) 5 MG TABS tablet Take 1 tablet by mouth in the morning and 1 tablet before bedtime. 7/23/22   Miguel Gibbons MD   carbidopa-levodopa (SINEMET CR)  MG per extended release tablet Take 2 tablets by mouth in the morning and 2 tablets at noon and 2 tablets in the evening. 7/23/22   Miguel Gibbons MD   rOPINIRole (REQUIP) 1 MG tablet Take 1 tablet by mouth in the morning and 1 tablet at noon and 1 tablet before bedtime.  7/23/22   Miguel Gibbons MD   amiodarone (CORDARONE) 200 MG tablet Take 1 tablet by mouth in the morning. 7/24/22 9/8/22  Miguel Gibbons MD   budesonide (PULMICORT) 0.5 MG/2ML nebulizer suspension Take 2 mLs by nebulization in the morning and 2 mLs in the evening. 7/23/22 9/8/22  Miguel Gibbons MD insulin glargine-yfgn (SEMGLEE-YFGN) 100 UNIT/ML injection vial Inject 5 Units into the skin nightly 7/23/22 9/8/22  Fermin Lyn MD   QUEtiapine (SEROQUEL) 25 MG tablet Take 1 tablet by mouth in the morning and 1 tablet before bedtime. 7/23/22 9/8/22  Fermin Lyn MD   sucralfate (CARAFATE) 1 GM tablet Take 1 tablet by mouth in the morning and 1 tablet at noon and 1 tablet in the evening and 1 tablet before bedtime. 7/20/22   Leyla Gaines DO   divalproex (DEPAKOTE) 250 MG DR tablet Take 250 mg by mouth 2 times daily  7/23/22  Historical Provider, MD   ferrous sulfate (IRON 325) 325 (65 Fe) MG tablet Take 325 mg by mouth daily (with breakfast)  Patient not taking: Reported on 7/7/2022 7/23/22  Historical Provider, MD   aspirin EC 81 MG EC tablet Take 1 tablet by mouth daily 5/17/22   Drakelia Morning, MD   montelukast (SINGULAIR) 10 MG tablet Take 10 mg by mouth nightly    Historical Provider, MD        Allergies   Ciprofloxacin and Codeine    Social History     Social History       Tobacco History       Smoking Status  Never      Smokeless Tobacco Use  Never              Alcohol History       Alcohol Use Status  No              Drug Use       Drug Use Status  No              Sexual Activity       Sexually Active  Never                    Family History     Family History   Problem Relation Age of Onset    Cancer Mother         Lung Ca    Cancer Father         lung Ca    Diabetes Sister     Heart Disease Sister     Seizures Son     Other Brother         sepsis       Review of Systems   Review of Systems   Respiratory:  Positive for cough and shortness of breath. All other systems reviewed and are negative. Physical Exam   /73   Pulse 79   Temp 100.4 °F (38 °C) (Bladder)   Resp 25   SpO2 100%     Physical Exam  Constitutional:       General: She is in acute distress. Appearance: She is toxic-appearing. HENT:      Head: Normocephalic and atraumatic.    Eyes: Extraocular Movements: Extraocular movements intact. Conjunctiva/sclera: Conjunctivae normal.      Pupils: Pupils are equal, round, and reactive to light. Cardiovascular:      Rate and Rhythm: Normal rate. Pulmonary:      Effort: Respiratory distress present. Breath sounds: Wheezing and rhonchi present. Musculoskeletal:      Right lower leg: Edema present. Left lower leg: Edema present. Skin:     General: Skin is warm and dry. Capillary Refill: Capillary refill takes less than 2 seconds. Neurological:      General: No focal deficit present. Mental Status: She is oriented to person, place, and time. Mental status is at baseline.    Psychiatric:         Mood and Affect: Mood normal.       Labs      Recent Results (from the past 24 hour(s))   POCT Glucose    Collection Time: 10/05/22 10:48 AM   Result Value Ref Range    Meter Glucose 286 (H) 74 - 99 mg/dL   CBC with Auto Differential    Collection Time: 10/05/22 11:19 AM   Result Value Ref Range    WBC 9.9 4.5 - 11.5 E9/L    RBC 3.99 3.50 - 5.50 E12/L    Hemoglobin 10.9 (L) 11.5 - 15.5 g/dL    Hematocrit 37.4 34.0 - 48.0 %    MCV 93.7 80.0 - 99.9 fL    MCH 27.3 26.0 - 35.0 pg    MCHC 29.1 (L) 32.0 - 34.5 %    RDW 15.5 (H) 11.5 - 15.0 fL    Platelets 359 391 - 626 E9/L    MPV 11.4 7.0 - 12.0 fL    Neutrophils % 82.4 (H) 43.0 - 80.0 %    Immature Granulocytes % 1.1 0.0 - 5.0 %    Lymphocytes % 9.4 (L) 20.0 - 42.0 %    Monocytes % 6.5 2.0 - 12.0 %    Eosinophils % 0.2 0.0 - 6.0 %    Basophils % 0.4 0.0 - 2.0 %    Neutrophils Absolute 8.16 (H) 1.80 - 7.30 E9/L    Immature Granulocytes # 0.11 E9/L    Lymphocytes Absolute 0.93 (L) 1.50 - 4.00 E9/L    Monocytes Absolute 0.64 0.10 - 0.95 E9/L    Eosinophils Absolute 0.02 (L) 0.05 - 0.50 E9/L    Basophils Absolute 0.04 0.00 - 0.20 E9/L   Comprehensive Metabolic Panel w/ Reflex to MG    Collection Time: 10/05/22 11:19 AM   Result Value Ref Range    Sodium 138 132 - 146 mmol/L    Potassium reflex Magnesium 4.2 3.5 - 5.0 mmol/L    Chloride 94 (L) 98 - 107 mmol/L    CO2 34 (H) 22 - 29 mmol/L    Anion Gap 10 7 - 16 mmol/L    Glucose 309 (H) 74 - 99 mg/dL    BUN 26 (H) 6 - 23 mg/dL    Creatinine 1.3 (H) 0.5 - 1.0 mg/dL    GFR Non-African American 40 >=60 mL/min/1.73    GFR African American 49     Calcium 9.1 8.6 - 10.2 mg/dL    Total Protein 6.7 6.4 - 8.3 g/dL    Albumin 3.7 3.5 - 5.2 g/dL    Total Bilirubin 0.7 0.0 - 1.2 mg/dL    Alkaline Phosphatase 92 35 - 104 U/L    ALT 5 0 - 32 U/L    AST 7 0 - 31 U/L   Troponin    Collection Time: 10/05/22 11:19 AM   Result Value Ref Range    Troponin, High Sensitivity 43 (H) 0 - 9 ng/L   Brain Natriuretic Peptide    Collection Time: 10/05/22 11:19 AM   Result Value Ref Range    Pro-BNP 12,924 (H) 0 - 125 pg/mL   Beta-Hydroxybutyrate    Collection Time: 10/05/22 11:19 AM   Result Value Ref Range    Beta-Hydroxybutyrate 0.10 0.02 - 0.27 mmol/L   Lactate, Sepsis    Collection Time: 10/05/22 11:19 AM   Result Value Ref Range    Lactic Acid, Sepsis 2.0 (H) 0.5 - 1.9 mmol/L   Digoxin Level    Collection Time: 10/05/22 11:19 AM   Result Value Ref Range    Digoxin Lvl 2.2 (H) 0.8 - 2.0 ng/mL   COVID-19, Rapid    Collection Time: 10/05/22 11:19 AM    Specimen: Nasopharyngeal Swab   Result Value Ref Range    SARS-CoV-2, NAAT Not Detected Not Detected   RAPID INFLUENZA A/B ANTIGENS    Collection Time: 10/05/22 11:19 AM    Specimen: Nasopharyngeal   Result Value Ref Range    Influenza A by PCR Not Detected Not Detected    Influenza B by PCR Not Detected Not Detected   Urinalysis with Microscopic    Collection Time: 10/05/22 11:19 AM   Result Value Ref Range    Color, UA Yellow Straw/Yellow    Clarity, UA SLCLOUDY Clear    Glucose, Ur 500 (A) Negative mg/dL    Bilirubin Urine Negative Negative    Ketones, Urine Negative Negative mg/dL    Specific Gravity, UA 1.020 1.005 - 1.030    Blood, Urine MODERATE (A) Negative    pH, UA 5.5 5.0 - 9.0    Protein, UA 30 (A) Negative mg/dL Urobilinogen, Urine 0.2 <2.0 E.U./dL    Nitrite, Urine POSITIVE (A) Negative    Leukocyte Esterase, Urine Negative Negative    WBC, UA 5-10 (A) 0 - 5 /HPF    RBC, UA 5-10 (A) 0 - 2 /HPF    Epithelial Cells, UA RARE /HPF    Bacteria, UA MANY (A) None Seen /HPF   Arterial Blood Gas, Respiratory Only    Collection Time: 10/05/22 11:19 AM   Result Value Ref Range    POC Source Arterial     FIO2 3.0     PH 37 7.255 (L) 7.350 - 7.450    PCO2 37 75.7 (HH) 35.0 - 45.0 mmHg    PO2 37 75.5 60.0 - 80.0 mmHg    HCO3 33.6 (H) 22.0 - 26.0 mmol/L    B.E. 4.4 (H) -3.0 - 3.0 mmol/L    O2 Sat 91.6 (L) 92.0 - 98.5 %     ID 2,220     DEVICE [id number]     Critical Notification Yes    Respiratory Panel, Molecular, with COVID-19 (Restricted: peds pts or suitable admitted adults)    Collection Time: 10/05/22 11:19 AM    Specimen: Nasopharyngeal   Result Value Ref Range    Adenovirus by PCR Not Detected Not Detected    Bordetella parapertussis by PCR Not Detected Not Detected    Bordetella pertussis by PCR Not Detected Not Detected    Chlamydophilia pneumoniae by PCR Not Detected Not Detected    Coronavirus 229E by PCR Not Detected Not Detected    Coronavirus HKU1 by PCR Not Detected Not Detected    Coronavirus NL63 by PCR Not Detected Not Detected    Coronavirus OC43 by PCR Not Detected Not Detected    SARS-CoV-2, PCR Not Detected Not Detected    Human Metapneumovirus by PCR Not Detected Not Detected    Human Rhinovirus/Enterovirus by PCR Not Detected Not Detected    Influenza A by PCR Not Detected Not Detected    Influenza B by PCR Not Detected Not Detected    Mycoplasma pneumoniae by PCR Not Detected Not Detected    Parainfluenza Virus 1 by PCR Not Detected Not Detected    Parainfluenza Virus 2 by PCR Not Detected Not Detected    Parainfluenza Virus 3 by PCR Not Detected Not Detected    Parainfluenza Virus 4 by PCR Not Detected Not Detected    Respiratory Syncytial Virus by PCR Not Detected Not Detected   EKG 12 Lead Collection Time: 10/05/22 11:46 AM   Result Value Ref Range    Ventricular Rate 86 BPM    Atrial Rate 46 BPM    QRS Duration 82 ms    Q-T Interval 308 ms    QTc Calculation (Bazett) 368 ms    R Axis 86 degrees    T Axis -102 degrees   Blood Gas, Arterial    Collection Time: 10/05/22 11:54 AM   Result Value Ref Range    Date Analyzed 20221005     Time Analyzed 1630     Source: Blood Arterial     pH, Blood Gas 7.316 (L) 7.350 - 7.450    PCO2 70.7 (HH) 35.0 - 45.0 mmHg    PO2 172.5 (H) 75.0 - 100.0 mmHg    HCO3 35.3 (H) 22.0 - 26.0 mmol/L    B.E. 7.4 (H) -3.0 - 3.0 mmol/L    O2 Sat 98.4 92.0 - 98.5 %    PO2/FIO2 2.88 mmHg/%    AaDO2 162.5 mmHg    RI(T) 0.94     O2Hb 97.8 (H) 94.0 - 97.0 %    COHb 0.2 0.0 - 1.5 %    MetHb 0.4 0.0 - 1.5 %    O2 Content 14.4 mL/dL    HHb 1.6 0.0 - 5.0 %    tHb (est) 10.2 (L) 11.5 - 16.5 g/dL    Mode BILEVEL     FIO2 60.0 %    Peep/Cpap 6.0 cmH2O    PS 20 cmH20    Comment presented to Dr. Robb Sherman     Date Of Collection      Time Collected      Pt Temp 37.0 C     ID [id number]     Lab [id number]     Critical(s) Notified Handed report to Dr/LORNA    Arterial Blood Gas, Respiratory Only    Collection Time: 10/05/22 12:48 PM   Result Value Ref Range    POC Source Arterial     FIO2 60.0     PH 37 7.266 (L) 7.350 - 7.450    PCO2 37 71.8 (HH) 35.0 - 45.0 mmHg    PO2 37 116.3 (H) 60.0 - 80.0 mmHg    HCO3 32.6 (H) 22.0 - 26.0 mmol/L    B.E. 4.1 (H) -3.0 - 3.0 mmol/L    O2 Sat 97.6 92.0 - 98.5 %     ID 2,220     DEVICE 17,[id number]     Critical Notification Yes     Mode BiLevel     Pressure Support 12 cmH2O    Positive End EXP Press 6 cmH2O   POCT Glucose    Collection Time: 10/05/22  5:34 PM   Result Value Ref Range    Meter Glucose 210 (H) 74 - 99 mg/dL   Blood Gas, Arterial    Collection Time: 10/05/22  5:54 PM   Result Value Ref Range    Date Analyzed 20221005     Time Analyzed 1754     Source: Blood Arterial     pH, Blood Gas 7.454 (H) 7.350 - 7.450    PCO2 47.7 (H) 35.0 - 45.0 mmHg PO2 252.2 (H) 75.0 - 100.0 mmHg    HCO3 32.7 (H) 22.0 - 26.0 mmol/L    B.E. 7.8 (H) -3.0 - 3.0 mmol/L    O2 Sat 98.7 (H) 92.0 - 98.5 %    PO2/FIO2 4.20 mmHg/%    AaDO2 108.1 mmHg    RI(T) 0.43     O2Hb 98.0 (H) 94.0 - 97.0 %    COHb 0.3 0.0 - 1.5 %    MetHb 0.4 0.0 - 1.5 %    O2 Content 14.6 mL/dL    HHb 1.3 0.0 - 5.0 %    tHb (est) 10.1 (L) 11.5 - 16.5 g/dL    Mode BILEVEL     FIO2 60.0 %    Peep/Cpap 6.0 cmH2O    PS 20 cmH20    Date Of Collection      Time Collected      Pt Temp 37.0 C     ID [id number]     Lab [id number]     Critical(s) Notified .  No Critical Values    POCT Glucose    Collection Time: 10/05/22  8:11 PM   Result Value Ref Range    Meter Glucose 174 (H) 74 - 99 mg/dL   Lactic Acid    Collection Time: 10/05/22  8:18 PM   Result Value Ref Range    Lactic Acid 1.6 0.5 - 2.2 mmol/L   Lactic Acid    Collection Time: 10/05/22 10:58 PM   Result Value Ref Range    Lactic Acid 1.5 0.5 - 2.2 mmol/L   POCT Glucose    Collection Time: 10/06/22  2:27 AM   Result Value Ref Range    Meter Glucose 142 (H) 74 - 99 mg/dL   Comprehensive Metabolic Panel    Collection Time: 10/06/22  5:00 AM   Result Value Ref Range    Sodium 142 132 - 146 mmol/L    Potassium 4.0 3.5 - 5.0 mmol/L    Chloride 103 98 - 107 mmol/L    CO2 33 (H) 22 - 29 mmol/L    Anion Gap 6 (L) 7 - 16 mmol/L    Glucose 134 (H) 74 - 99 mg/dL    BUN 25 (H) 6 - 23 mg/dL    Creatinine 1.1 (H) 0.5 - 1.0 mg/dL    GFR Non-African American 49 >=60 mL/min/1.73    GFR African American 59     Calcium 8.4 (L) 8.6 - 10.2 mg/dL    Total Protein 5.2 (L) 6.4 - 8.3 g/dL    Albumin 3.0 (L) 3.5 - 5.2 g/dL    Total Bilirubin 0.5 0.0 - 1.2 mg/dL    Alkaline Phosphatase 68 35 - 104 U/L    ALT 5 0 - 32 U/L    AST 8 0 - 31 U/L   Magnesium    Collection Time: 10/06/22  5:00 AM   Result Value Ref Range    Magnesium 1.8 1.6 - 2.6 mg/dL   Phosphorus    Collection Time: 10/06/22  5:00 AM   Result Value Ref Range    Phosphorus 2.6 2.5 - 4.5 mg/dL   Digoxin Level    Collection Time: 10/06/22  5:00 AM   Result Value Ref Range    Digoxin Lvl 1.6 0.8 - 2.0 ng/mL   CBC with Auto Differential    Collection Time: 10/06/22  5:00 AM   Result Value Ref Range    WBC 5.5 4.5 - 11.5 E9/L    RBC 3.48 (L) 3.50 - 5.50 E12/L    Hemoglobin 9.3 (L) 11.5 - 15.5 g/dL    Hematocrit 32.5 (L) 34.0 - 48.0 %    MCV 93.4 80.0 - 99.9 fL    MCH 26.7 26.0 - 35.0 pg    MCHC 28.6 (L) 32.0 - 34.5 %    RDW 15.4 (H) 11.5 - 15.0 fL    Platelets 491 298 - 869 E9/L    MPV 11.0 7.0 - 12.0 fL    Neutrophils % 78.1 43.0 - 80.0 %    Lymphocytes % 13.2 (L) 20.0 - 42.0 %    Monocytes % 6.1 2.0 - 12.0 %    Eosinophils % 0.7 0.0 - 6.0 %    Basophils % 0.4 0.0 - 2.0 %    Neutrophils Absolute 4.35 1.80 - 7.30 E9/L    Lymphocytes Absolute 0.83 (L) 1.50 - 4.00 E9/L    Monocytes Absolute 0.33 0.10 - 0.95 E9/L    Eosinophils Absolute 0.00 (L) 0.05 - 0.50 E9/L    Basophils Absolute 0.00 0.00 - 0.20 E9/L    Atypical Lymphocytes Relative 1.8 0.0 - 4.0 %    Myelocyte Percent 0.9 0 - 0 %    nRBC 0.9 /100 WBC    Polychromasia 1+     Hypochromia 2+     Poikilocytes 1+     Ovalocytes 1+    Arterial Blood Gas, Respiratory Only    Collection Time: 10/06/22  5:04 AM   Result Value Ref Range    POC Source Arterial     FIO2 60.0     PH 37 7.367 7.350 - 7.450    PCO2 37 55.3 (H) 35.0 - 45.0 mmHg    PO2 37 164.5 (H) 60.0 - 80.0 mmHg    HCO3 31.8 (H) 22.0 - 26.0 mmol/L    B.E. 5.4 (H) -3.0 - 3.0 mmol/L    O2 Sat 99.4 (H) 92.0 - 98.5 %     ID 1,632     DEVICE 15,[id number]     Mode bipap14/6         Imaging/Diagnostics Last 24 Hours   XR CHEST PORTABLE    Result Date: 10/5/2022  EXAMINATION: ONE XRAY VIEW OF THE CHEST 10/5/2022 11:37 am COMPARISON: 10/03/2022. HISTORY: ORDERING SYSTEM PROVIDED HISTORY: SOB TECHNOLOGIST PROVIDED HISTORY: Reason for exam:->SOB FINDINGS: The cardiac silhouette is enlarged. There is parahilar peribronchial thickening. The lungs demonstrate mild diffuse interstitial changes.   There is atelectasis in the right lung base and the right hemidiaphragm is elevated. No pneumothorax or definite pleural effusion is seen. Enlarged cardiac silhouette. Parahilar peribronchial thickening and mild pulmonary interstitial changes. Right basilar atelectasis and elevated right hemidiaphragm. Assessment      Hospital Problems             Last Modified POA    * (Principal) Sepsis ([facility] [address] 10/5/2022 Yes     1) Acute hypoxemic and hypercapnic respiratory failure secondary to acute diastolic dysfunction and possibly pneumonia in the setting of SERENA.      2) Sepsis due to pyelonephritis and possible pneumonia  --     3) Acute kidney injury in a patient with CKD       4) Diabetes mellitus       5) Atrial fibrillation         Plan   Admit to ICU  IV antibiotics  Pan cultures  Check labs in am  Aerosols  Diuresis  DVT prophylaxis    Consultations Ordered:  IP CONSULT TO CRITICAL CARE  IP CONSULT TO INTERNAL MEDICINE  IP CONSULT TO OTOLARYNGOLOGY  PHARMACY TO DOSE VANCOMYCIN    Electronically signed by Triston Lam DO on 10/6/22 at 8:38 AM EDT

## 2022-10-06 NOTE — PLAN OF CARE
Problem: Discharge Planning  Goal: Discharge to home or other facility with appropriate resources  Outcome: Progressing     Problem: Safety - Adult  Goal: Free from fall injury  Outcome: Progressing     Problem: Skin/Tissue Integrity  Goal: Absence of new skin breakdown  Description: 1. Monitor for areas of redness and/or skin breakdown  2. Assess vascular access sites hourly  3. Every 4-6 hours minimum:  Change oxygen saturation probe site  4. Every 4-6 hours:  If on nasal continuous positive airway pressure, respiratory therapy assess nares and determine need for appliance change or resting period.   Outcome: Progressing     Problem: ABCDS Injury Assessment  Goal: Absence of physical injury  Outcome: Progressing     Problem: Chronic Conditions and Co-morbidities  Goal: Patient's chronic conditions and co-morbidity symptoms are monitored and maintained or improved  Outcome: Progressing     Problem: Nutrition Deficit:  Goal: Optimize nutritional status  Outcome: Progressing

## 2022-10-06 NOTE — PROGRESS NOTES
Comprehensive Nutrition Assessment    Type and Reason for Visit:  RD Nutrition Re-Screen/LOS    Nutrition Recommendations/Plan:   Continue current diet & ordered Ensure Pudding BID to optimize nutrition sttatus  Continue to monitor. Malnutrition Assessment:  Malnutrition Status: At risk for malnutrition (Comment) (10/06/22 1606)    Context:  Chronic Illness     Findings of the 6 clinical characteristics of malnutrition:  Energy Intake:  Mild decrease in energy intake (Comment)  Weight Loss:  Unable to assess (d/t fluid status r/t CHF)     Body Fat Loss:  No significant body fat loss     Muscle Mass Loss:  No significant muscle mass loss    Fluid Accumulation:  No significant fluid accumulation     Strength:  Not Performed    Nutrition Assessment:    Pt admits from SNF w/ sepsis/rt LL/infiltrate/UTI. Hx DM, CHF, CA Breast), A-fib, CKD, CAD, Parkinsons, Aphasia. Noted 10/5 s/p Endoscopy. Noted per SLP, pt is on a modified diet/ nectar thick lq, no inoted ntake. Will order ONS to optimize nutritiona status & monitor. Nutrition Related Findings:    A/O x4, obese/soft/rounded abd +BS, I/O +2.6L, non-pitting generalized, trace BUE & non-pitting BLE edema. Wound Type: Surgical Incision (rt 2nd toe)       Current Nutrition Intake & Therapies:    Average Meal Intake: Unable to assess  Average Supplements Intake: None Ordered  ADULT DIET; Dysphagia - Pureed; Mildly Thick (Nectar)    Anthropometric Measures:  Height: 5' (152.4 cm)  Ideal Body Weight (IBW): 100 lbs (45 kg)    Admission Body Weight: 200 lb (90.7 kg) (10/3 stated)  Current Body Weight: 200 lb (90.7 kg), 200 % IBW.     Current BMI (kg/m2): 39.1  Usual Body Weight:  (JOHN d/t fluid fluc r/t CHF)     Weight Adjustment For: No Adjustment     BMI Categories: Obese Class 2 (BMI 35.0 -39.9)    Estimated Daily Nutrient Needs:  Energy Requirements Based On: Formula  Weight Used for Energy Requirements: Current  Energy (kcal/day): 9887-9416  Weight Used for Protein Requirements: Ideal  Protein (g/day): 45-55 (1.1-1.2 gm/kg review renal labs)  Method Used for Fluid Requirements: 1 ml/kcal  Fluid (ml/day): 2658-7953    Nutrition Diagnosis:   Increased nutrient needs related to impaired respiratory function (CHF/PNA) as evidenced by wounds, swallow study results, intake 0-25%    Nutrition Interventions:   Food and/or Nutrient Delivery: Continue Current Diet, Start Oral Nutrition Supplement  Nutrition Education/Counseling: No recommendation at this time  Coordination of Nutrition Care: Continue to monitor while inpatient     Goals:     Goals: PO intake 75% or greater     Nutrition Monitoring and Evaluation:   Behavioral-Environmental Outcomes: None Identified  Food/Nutrient Intake Outcomes: Diet Advancement/Tolerance, Food and Nutrient Intake, Supplement Intake (When medically feasible)  Physical Signs/Symptoms Outcomes: Biochemical Data, Chewing or Swallowing, GI Status, Fluid Status or Edema, Weight, Skin, Nutrition Focused Physical Findings    Discharge Planning:     Too soon to determine     Corrinne Loma, RD  Contact: [phone]

## 2022-10-06 NOTE — PROGRESS NOTES
Physician Progress Note      PATIENT:               Agatha Giang  CSN #:                  [id number]  :                       1950  ADMIT DATE:       10/5/2022 10:40 AM  DISCH DATE:  RESPONDING  PROVIDER #:        Vish Kumar DO        QUERY TEXT:    Stage of Chronic Kidney Disease: Please provide further specificity, if known. Clinical indicators include: ckd, gfr, chronic kidney disease, cr, bun,   creatinine  Options provided:  -- Chronic kidney disease stage 1  -- Chronic kidney disease stage 2  -- Chronic kidney disease stage 3  -- Chronic kidney disease stage 3a  -- Chronic kidney disease stage 3b  -- Chronic kidney disease stage 4  -- Chronic kidney disease stage 5  -- Chronic kidney disease stage 5, requiring dialysis  -- End stage renal disease  -- Other - I will add my own diagnosis  -- Disagree - Not applicable / Not valid  -- Disagree - Clinically Unable to determine / Unknown        PROVIDER RESPONSE TEXT:    The patient has chronic kidney disease stage 3a.       Electronically signed by:  Vish Kumar DO 10/6/2022 4:12 PM

## 2022-10-06 NOTE — CARE COORDINATION
10/6/2022 CM transition of care; ICU, 2lnc. Pt is from [address], long term bedhold- NO PRECERT to return, needs signed [place]. CM will follow.  Electronically signed by GI Flower on 10/6/2022 at 2:58 PM         Readmission Assessment  Number of Days since last admission?: 8-30 days  [facility]  Who is being Interviewed: Caregiver (SNF)  What was the patient's/caregiver's perception as to why they think they needed to return back to the hospital?:  (sob- hypoxia- hoarsness)  Did you visit your Primary Care Physician after you left the hospital, before you returned this time?: No  Why weren't you able to visit your PCP?: Did not have an appointment  Did you see a specialist, such as Cardiac, Pulmonary, Orthopedic Physician, etc. after you left the hospital?: No  Who advised the patient to return to the hospital?: Skilled Unit  In our efforts to provide the best possible care to you and others like you, can you think of anything that we could have done to help you after you left the hospital the first time, so that you might not have needed to return so soon?: Other (Comment) (plans to return to long term care facility)      Electronically signed by GI Flower on 10/6/2022 at 3:05 PM

## 2022-10-06 NOTE — PROGRESS NOTES
Pharmacy Consultation Note  (Antibiotic Dosing and Monitoring)    Initial consult date: 10/6/22  Consulting physician/provider: RASHARD Ann CNP  Drug: Vancomycin  Indication: UTI    Age/  Gender Height Weight IBW  Allergy Information   71 y.o./female         Ideal body weight: 45.5 kg (100 lb 4.9 oz)  Adjusted ideal body weight: 63.6 kg (140 lb 3 oz)   Ciprofloxacin and Codeine      Renal Function:  Recent Labs     10/03/22  2335 10/05/22  1119 10/06/22  0500   BUN 26* 26* 25*   CREATININE 1.3* 1.3* 1.1*       Intake/Output Summary (Last 24 hours) at 10/6/2022 2059  Last data filed at 10/6/2022 0541  Gross per 24 hour   Intake 3062.29 ml   Output 500 ml   Net 2562.29 ml       Vancomycin Monitoring:  Trough:  No results for input(s): VANCOTROUGH in the last 72 hours. Random:  No results for input(s): VANCORANDOM in the last 72 hours. No results for input(s): Lincoln Adria in the last 72 hours. Historical Cultures:  Organism   Date Value Ref Range Status   07/06/2022 Klebsiella pneumoniae ssp pneumoniae (A)  Final     No results for input(s): BC in the last 72 hours. Vancomycin Administration Times:  Recent vancomycin administrations        No vancomycin IV orders with administrations found. Assessment:  Patient is a 66 y.o. female who has been initiated on vancomycin  Estimated Creatinine Clearance: 46 mL/min (A) (based on SCr of 1.1 mg/dL (H)). To dose vancomycin, pharmacy will be utilizing [employer] calculation software for goal AUC/DARELL 400-600 mg/L-hr    Plan:   Will initiate vancomycin 750 mg IV every 12 hours  Will check vancomycin levels when appropriate  Will continue to monitor renal function   Pharmacy to follow      NAVDEEP Hermosillo [facility] 10/6/2022 7:12 AM

## 2022-10-06 NOTE — PROGRESS NOTES
Spoke with Dion Barraza, [address] [place] [address] NP about daughter Lilliana's concerns about patient having a facial droop and swelling in face.

## 2022-10-06 NOTE — ACP (ADVANCE CARE PLANNING)
Advance Care Planning   Healthcare Decision Maker:    Primary Decision Maker: Rush Adjutant Child - [phone]    Primary Decision Maker: Teri Caruso Child - [phone]    Primary Decision Maker: Patricia miller - Child - [phone]    No dpoa-hc documents on file. State of [place] Surrogate decision-makers according to [place]---- majority of adult children.  Electronically signed by Chucky Aase, RN-BC on 10/6/2022 at 3:24 PM

## 2022-10-06 NOTE — DISCHARGE INSTR - COC
Continuity of Care Form    Patient Name: Tamiko Hendrix   :  [birth year]  MRN:  [id number]    Admit date:  10/5/2022  Discharge date:  10/10/2022    Code Status Order: Full Code   Advance Directives:     Admitting Physician:  Triston Lam DO  PCP: Triston Lam DO    Discharging Nurse: [facility] Unit/Room#: IC12/IC12-01  Discharging Unit Phone Number: [phone]    Emergency Contact:   Extended Emergency Contact Information  Primary Emergency Contact: Kirstymatthewburton Calderóny [address] Phone: [phone]  Mobile Phone: [phone]  Relation: Child   needed? No  Secondary Emergency Contact: Lilliana Pedroza  [place] Phone: [phone]  Mobile Phone: [phone]  Relation: Child   needed?  No    Past Surgical History:  Past Surgical History:   Procedure Laterality Date    BREAST LUMPECTOMY      BREAST REDUCTION SURGERY      CARDIAC CATHETERIZATION  2014    Dr. Hesham Holly  2022    Dr. Quang Reed  7/29/15    CT GUIDED CHEST TUBE  2022    CT GUIDED CHEST TUBE 2022 Mary Lira MD [facility]    ENDOSCOPY, COLON, DIAGNOSTIC  7/19/15    GALLBLADDER SURGERY      LUMBAR LAMINECTOMY      TOE AMPUTATION      TONSILLECTOMY      UPPER GASTROINTESTINAL ENDOSCOPY      UPPER GASTROINTESTINAL ENDOSCOPY N/A 2022    EGD ESOPHAGOGASTRODUODENOSCOPY performed by Sultana Godfrey MD at [address] [place] [address]       Immunization History:   Immunization History   Administered Date(s) Administered    COVID-19, PFIZER GRAY top, DO NOT Dilute, (age 15 y+), IM, 30 mcg/0.3 mL 2022    Influenza Vaccine, unspecified formulation 10/15/2014    Influenza Virus Vaccine 10/31/2008, 10/05/2011    Influenza, High Dose (Fluzone 65 yrs and older) 2015, 2016, 2017, 2019    Influenza, Triv, inactivated, subunit, adjuvanted, IM (Fluad 65 yrs and older) 2019    Pneumococcal Conjugate 13-valent Trisha Noriega) 05/27/2016    Pneumococcal Polysaccharide (Yeiaoiuvb56) 12/21/2012, 01/29/2018    Td, unspecified formulation 03/11/2014    Tdap (Boostrix, Adacel) 09/30/2015    Zoster Live (Zostavax) 06/25/2013    Zoster Recombinant (Shingrix) 12/10/2019       Active Problems:  Patient Active Problem List   Diagnosis Code    Depression with anxiety F41.8    Osteoporosis M81.0    Asthma J45.909    Hyperlipidemia E78.5    Mitral regurgitation I34.0    Obstructive sleep apnea syndrome G47.33    Psoriasis L40.9    Diabetes mellitus (HCC) E11.9    Parkinson's disease ([facility] G20    Primary hypertension I10    Microalbuminuria R80.9    Morbid obesity ([facility] E66.01    RLS (restless legs syndrome) G25.81    Generalized weakness R53.1    Inability to walk R26.2    Hypothyroidism E03.9    Chest pain R07.9    Acute asthma exacerbation J45.901    Asthma exacerbation, mild J45.901    Paroxysmal atrial fibrillation (HCC) I48.0    Atrial fibrillation with RVR ([facility]) I48.91    Acute on chronic congestive heart failure (HCC) I50.9    Coronary artery disease involving native coronary artery of native heart without angina pectoris I25.10    Dysphagia R13.10    Hepatosplenomegaly R16.2    Acute decompensated heart failure (HCC) D84.4    Acute diastolic (congestive) heart failure (HCC) I50.31    Nonrheumatic tricuspid valve regurgitation I36.1    Tobacco abuse Z72.0    Recurrent syncope R55    Septic shock (HCC) A41.9, R65.21    Seizure-like activity (HCC) R56.9    SHANTANU (acute kidney injury) ([facility] N17.9    Pancytopenia (HCC) D61.818    Thrombocytopenia (HCC) D69.6    Encephalopathy G93.40    Acute respiratory failure with hypoxia (HCC) J96.01    Lactic acidosis E87.20    Delirium R41.0    Acute on chronic anemia D64.9    Pleural effusion, right J90    Acute respiratory failure with hypoxia and hypercapnia (HCC) J96.01, J96.02    Acute confusion R41.0    Chronic diastolic (congestive) heart failure (HCC) I50.32    Macrocytosis D75.89    Other specified anemias D64.89    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[facility] E11.22, N18.30    Puerperal sepsis with acute hypoxic respiratory failure without septic shock ([facility]) O85, R65.20, J96.01    Pulmonary HTN ([facility]) I27.20    Chronic anticoagulation Z79.01    RVF (right ventricular failure) ([facility]) F22.235    Metabolic alkalosis N57.2    Sepsis ([facility]) A41.9       Isolation/Infection:   Isolation            No Isolation          Patient Infection Status       Infection Onset Added Last Indicated Last Indicated By Review Planned Expiration Resolved Resolved By    None active    Resolved    COVID-19 (Rule Out) 10/05/22 10/05/22 10/05/22 Respiratory Panel, Molecular, with COVID-19 (Restricted: peds pts or suitable admitted adults) (Ordered)   10/05/22 Rule-Out Test Resulted    COVID-19 (Rule Out) 10/05/22 10/05/22 10/05/22 COVID-19, Rapid (Ordered)   10/05/22 Rule-Out Test Resulted    COVID-19 (Rule Out) 08/24/22 08/24/22 08/25/22 Respiratory Panel, Molecular, with COVID-19 (Restricted: peds pts or suitable admitted adults) (Ordered)   08/25/22 Rule-Out Test Resulted    COVID-19 (Rule Out) 07/16/22 07/16/22 07/16/22 Respiratory Panel, Molecular, with COVID-19 (Restricted: peds pts or suitable admitted adults) (Ordered)   07/16/22 Rule-Out Test Resulted    COVID-19 (Rule Out) 07/05/22 07/05/22 07/06/22 Respiratory Panel, Molecular, with COVID-19 (Restricted: peds pts or suitable admitted adults) (Ordered)   07/06/22 Rule-Out Test Resulted    COVID-19 (Rule Out) 05/11/22 05/11/22 05/11/22 COVID-19 & Influenza Combo (Ordered)   05/11/22 Rule-Out Test Resulted    COVID-19 (Rule Out) 03/17/22 03/17/22 03/17/22 Respiratory Panel, Molecular, with COVID-19 (Restricted: peds pts or suitable admitted adults) (Ordered)   03/17/22 Rule-Out Test Resulted    COVID-19 (Rule Out) 03/16/22 03/16/22 03/16/22 COVID-19, Rapid (Ordered)   03/16/22 Rule-Out Test Resulted    C-diff Rule Out 01/10/22 01/10/22 01/10/22 C. difficile toxin Molecular (Ordered)   03/17/22 5560 Xochitl Hassanryan Still, RN 1/11/22 1300     COVID-19 (Rule Out) 01/09/22 01/09/22 01/09/22 COVID-19, Rapid (Ordered)   01/09/22 Rule-Out Test Resulted    MRSA 05/19/21 05/21/21 05/19/21 Culture, Wound   10/25/21 Kwadwo Kaplan RN    COVID-19 (Rule Out) 02/04/21 02/04/21 02/04/21 COVID-19 (Ordered)   02/04/21 Rule-Out Test Resulted            Nurse Assessment:  Last Vital Signs: BP (!) 108/51   Pulse 91   Temp 100.2 °F (37.9 °C)   Resp 23   SpO2 92%     Last documented pain score (0-10 scale): Pain Level: 0  Last Weight:   Wt Readings from Last 1 Encounters:   10/03/22 200 lb (90.7 kg)     Mental Status:  oriented, alert, coherent, logical, and thought processes intact    IV Access:  - None    Nursing Mobility/ADLs:  Walking   Assisted  Transfer  Assisted  Bathing  Assisted  Dressing  Assisted  Toileting  Assisted  Feeding  Independent  Med Admin  Independent  Med Delivery   prefers mixed with pudding    Wound Care Documentation and Therapy:  Incision 10/05/22 Toe (Comment  which one) Right (Active)   Dressing Status Dry; Intact 08/31/22 1953   Dressing/Treatment Open to air 09/08/22 0924   Closure Sutures 09/08/22 0924   Margins Approximated 09/08/22 0924   Drainage Amount None 09/08/22 0924   Odor None 09/08/22 0924   Nicole-incision Assessment Ecchymosis 09/08/22 0924   Number of days: 0        Elimination:  Continence: Bowel: No  Bladder: No  Urinary Catheter: None   Colostomy/Ileostomy/Ileal Conduit: No       Date of Last BM: 10/10/2022    Intake/Output Summary (Last 24 hours) at 10/6/2022 1459  Last data filed at 10/6/2022 0541  Gross per 24 hour   Intake 1062.29 ml   Output 500 ml   Net 562.29 ml     I/O last 3 completed shifts: In: 3062.3 [I.V.:413.9; IV Piggyback:2648.4]  Out: 500 [Urine:500]    Safety Concerns: At Risk for Falls    Impairments/Disabilities:      Speech and Vision: Patient has raspy/whisper voice.     Nutrition Therapy:  Current Nutrition Therapy:   - Oral Diet:  Dysphagia 1 pureed    Routes of Feeding: Oral  Liquids: Nectar Thick Liquids  Daily Fluid Restriction: no  Last Modified Barium Swallow with Video (Video Swallowing Test): not done    Treatments at the Time of Hospital Discharge:   Respiratory Treatments: ***  Oxygen Therapy:  is on oxygen at 3 L/min per nasal cannula. Ventilator:    - BiPAP   IPAP: 20 cmH20, CPAP/EPAP: 6 cmH2O only when sleeping    Rehab Therapies: Physical Therapy and Occupational Therapy  Weight Bearing Status/Restrictions: No weight bearing restrictions  Other Medical Equipment (for information only, NOT a DME order):  bedside commode and hospital bed  Other Treatments: See [facility]    Patient's personal belongings (please select all that are sent with patient):  Jose    RN SIGNATURE:  Electronically signed by Jay Cardenas RN on 10/10/22 at 2:28 PM EDT    CASE MANAGEMENT/SOCIAL WORK SECTION    Inpatient Status Date: 10/5/2022    Readmission Risk Assessment Score:  Readmission Risk              Risk of Unplanned Readmission:  54           Discharging to Facility/ Agency   Name:    Name: [facility]   Address:[address] [place] [address] Phan Cerrato, [phone]      Dialysis Facility (if applicable)   Name:  Address:  Dialysis Schedule:  Phone:  Fax:    / signature: Electronically signed by Claudell Liter, RN-BC on 10/6/2022 at 3:00 PM      PHYSICIAN SECTION    Prognosis: Good    Condition at Discharge: Stable    Rehab Potential (if transferring to Rehab): Good    Recommended Labs or Other Treatments After Discharge:     Physician Certification: I certify the above information and transfer of Wes Santillan  is necessary for the continuing treatment of the diagnosis listed and that she requires Intermediate Nursing Care for greater 30 days.      Update Admission H&P: {P DME Changes in ZIO:[id number]}    PHYSICIAN SIGNATURE:  Electronically signed by Francisco Flores DO Leigha on 10/10/22 at 1:33 PM EDT

## 2022-10-07 ENCOUNTER — APPOINTMENT (OUTPATIENT)
Dept: GENERAL RADIOLOGY | Age: 72
DRG: 871 | End: 2022-10-07
Payer: MEDICARE

## 2022-10-07 LAB
AADO2: 107.1 MMHG
ALBUMIN SERPL-MCNC: 2.8 G/DL (ref 3.5–5.2)
ALP BLD-CCNC: 71 U/L (ref 35–104)
ALT SERPL-CCNC: 7 U/L (ref 0–32)
ANION GAP SERPL CALCULATED.3IONS-SCNC: 4 MMOL/L (ref 7–16)
ANISOCYTOSIS: ABNORMAL
AST SERPL-CCNC: 8 U/L (ref 0–31)
B.E.: 6.8 MMOL/L (ref -3–3)
BASOPHILS ABSOLUTE: 0 E9/L (ref 0–0.2)
BASOPHILS RELATIVE PERCENT: 0.5 % (ref 0–2)
BILIRUB SERPL-MCNC: 0.5 MG/DL (ref 0–1.2)
BUN BLDV-MCNC: 24 MG/DL (ref 6–23)
BURR CELLS: ABNORMAL
CALCIUM SERPL-MCNC: 8.5 MG/DL (ref 8.6–10.2)
CHLORIDE BLD-SCNC: 103 MMOL/L (ref 98–107)
CO2: 34 MMOL/L (ref 22–29)
COHB: 0.3 % (ref 0–1.5)
CREAT SERPL-MCNC: 1 MG/DL (ref 0.5–1)
CRITICAL: ABNORMAL
DATE ANALYZED: ABNORMAL
DATE OF COLLECTION: ABNORMAL
EOSINOPHILS ABSOLUTE: 0.07 E9/L (ref 0.05–0.5)
EOSINOPHILS RELATIVE PERCENT: 1.8 % (ref 0–6)
FIO2: 45 %
GFR AFRICAN AMERICAN: >60
GFR NON-AFRICAN AMERICAN: 54 ML/MIN/1.73
GLUCOSE BLD-MCNC: 149 MG/DL (ref 74–99)
HCO3: 32.9 MMOL/L (ref 22–26)
HCT VFR BLD CALC: 31.5 % (ref 34–48)
HEMOGLOBIN: 9.1 G/DL (ref 11.5–15.5)
HHB: 1.8 % (ref 0–5)
LAB: ABNORMAL
LYMPHOCYTES ABSOLUTE: 0.85 E9/L (ref 1.5–4)
LYMPHOCYTES RELATIVE PERCENT: 22.8 % (ref 20–42)
Lab: ABNORMAL
MAGNESIUM: 1.8 MG/DL (ref 1.6–2.6)
MCH RBC QN AUTO: 26.9 PG (ref 26–35)
MCHC RBC AUTO-ENTMCNC: 28.9 % (ref 32–34.5)
MCV RBC AUTO: 93.2 FL (ref 80–99.9)
METER GLUCOSE: 166 MG/DL (ref 74–99)
METER GLUCOSE: 171 MG/DL (ref 74–99)
METER GLUCOSE: 235 MG/DL (ref 74–99)
METER GLUCOSE: 254 MG/DL (ref 74–99)
METHB: 0.3 % (ref 0–1.5)
MODE: ABNORMAL
MONOCYTES ABSOLUTE: 0.15 E9/L (ref 0.1–0.95)
MONOCYTES RELATIVE PERCENT: 4.4 % (ref 2–12)
NEUTROPHILS ABSOLUTE: 2.63 E9/L (ref 1.8–7.3)
NEUTROPHILS RELATIVE PERCENT: 71.1 % (ref 43–80)
O2 CONTENT: 13.7 ML/DL
O2 SATURATION: 98.2 % (ref 92–98.5)
O2HB: 97.6 % (ref 94–97)
OPERATOR ID: 1821
ORGANISM: ABNORMAL
OVALOCYTES: ABNORMAL
PATIENT TEMP: 37 C
PCO2: 56 MMHG (ref 35–45)
PDW BLD-RTO: 15.5 FL (ref 11.5–15)
PEEP/CPAP: 6 CMH2O
PFO2: 3.08 MMHG/%
PH BLOOD GAS: 7.39 (ref 7.35–7.45)
PHOSPHORUS: 2.8 MG/DL (ref 2.5–4.5)
PLATELET # BLD: 138 E9/L (ref 130–450)
PMV BLD AUTO: 11.1 FL (ref 7–12)
PO2: 138.8 MMHG (ref 75–100)
POIKILOCYTES: ABNORMAL
POLYCHROMASIA: ABNORMAL
POTASSIUM SERPL-SCNC: 3.7 MMOL/L (ref 3.5–5)
RBC # BLD: 3.38 E12/L (ref 3.5–5.5)
RI(T): 0.77
SODIUM BLD-SCNC: 141 MMOL/L (ref 132–146)
SOURCE, BLOOD GAS: ABNORMAL
THB: 9.8 G/DL (ref 11.5–16.5)
TIME ANALYZED: 500
TOTAL PROTEIN: 5.3 G/DL (ref 6.4–8.3)
URINE CULTURE, ROUTINE: ABNORMAL
WBC # BLD: 3.7 E9/L (ref 4.5–11.5)

## 2022-10-07 PROCEDURE — 97530 THERAPEUTIC ACTIVITIES: CPT | Performed by: PHYSICAL THERAPIST

## 2022-10-07 PROCEDURE — 6370000000 HC RX 637 (ALT 250 FOR IP): Performed by: INTERNAL MEDICINE

## 2022-10-07 PROCEDURE — 6360000002 HC RX W HCPCS: Performed by: INTERNAL MEDICINE

## 2022-10-07 PROCEDURE — 92526 ORAL FUNCTION THERAPY: CPT

## 2022-10-07 PROCEDURE — 97110 THERAPEUTIC EXERCISES: CPT | Performed by: PHYSICAL THERAPIST

## 2022-10-07 PROCEDURE — 94640 AIRWAY INHALATION TREATMENT: CPT

## 2022-10-07 PROCEDURE — 97165 OT EVAL LOW COMPLEX 30 MIN: CPT

## 2022-10-07 PROCEDURE — 2580000003 HC RX 258: Performed by: INTERNAL MEDICINE

## 2022-10-07 PROCEDURE — 2700000000 HC OXYGEN THERAPY PER DAY

## 2022-10-07 PROCEDURE — 94660 CPAP INITIATION&MGMT: CPT

## 2022-10-07 PROCEDURE — 2000000000 HC ICU R&B

## 2022-10-07 PROCEDURE — 97530 THERAPEUTIC ACTIVITIES: CPT

## 2022-10-07 PROCEDURE — 84100 ASSAY OF PHOSPHORUS: CPT

## 2022-10-07 PROCEDURE — 82962 GLUCOSE BLOOD TEST: CPT

## 2022-10-07 PROCEDURE — 80053 COMPREHEN METABOLIC PANEL: CPT

## 2022-10-07 PROCEDURE — 71045 X-RAY EXAM CHEST 1 VIEW: CPT

## 2022-10-07 PROCEDURE — 97161 PT EVAL LOW COMPLEX 20 MIN: CPT | Performed by: PHYSICAL THERAPIST

## 2022-10-07 PROCEDURE — 83735 ASSAY OF MAGNESIUM: CPT

## 2022-10-07 PROCEDURE — 82805 BLOOD GASES W/O2 SATURATION: CPT

## 2022-10-07 PROCEDURE — 6370000000 HC RX 637 (ALT 250 FOR IP)

## 2022-10-07 PROCEDURE — 85025 COMPLETE CBC W/AUTO DIFF WBC: CPT

## 2022-10-07 RX ADMIN — INSULIN GLARGINE 13 UNITS: 100 INJECTION, SOLUTION SUBCUTANEOUS at 20:23

## 2022-10-07 RX ADMIN — ASPIRIN 81 MG CHEWABLE TABLET 81 MG: 81 TABLET CHEWABLE at 10:52

## 2022-10-07 RX ADMIN — LORAZEPAM 0.5 MG: 0.5 TABLET ORAL at 23:08

## 2022-10-07 RX ADMIN — INSULIN LISPRO 4 UNITS: 100 INJECTION, SOLUTION INTRAVENOUS; SUBCUTANEOUS at 20:23

## 2022-10-07 RX ADMIN — SODIUM CHLORIDE, POTASSIUM CHLORIDE, SODIUM LACTATE AND CALCIUM CHLORIDE: 600; 310; 30; 20 INJECTION, SOLUTION INTRAVENOUS at 02:36

## 2022-10-07 RX ADMIN — ATORVASTATIN CALCIUM 20 MG: 20 TABLET, FILM COATED ORAL at 20:13

## 2022-10-07 RX ADMIN — CEFEPIME 2000 MG: 2 INJECTION, POWDER, FOR SOLUTION INTRAVENOUS at 20:10

## 2022-10-07 RX ADMIN — ARFORMOTEROL TARTRATE 15 MCG: 15 SOLUTION RESPIRATORY (INHALATION) at 05:06

## 2022-10-07 RX ADMIN — APIXABAN 5 MG: 5 TABLET, FILM COATED ORAL at 20:13

## 2022-10-07 RX ADMIN — BUDESONIDE 500 MCG: 0.5 SUSPENSION RESPIRATORY (INHALATION) at 05:06

## 2022-10-07 RX ADMIN — ACETAMINOPHEN 650 MG: 325 TABLET ORAL at 19:01

## 2022-10-07 RX ADMIN — CEFEPIME 2000 MG: 2 INJECTION, POWDER, FOR SOLUTION INTRAVENOUS at 07:56

## 2022-10-07 RX ADMIN — APIXABAN 5 MG: 5 TABLET, FILM COATED ORAL at 10:52

## 2022-10-07 RX ADMIN — BUDESONIDE 500 MCG: 0.5 SUSPENSION RESPIRATORY (INHALATION) at 17:15

## 2022-10-07 RX ADMIN — VANCOMYCIN HYDROCHLORIDE 750 MG: 5 INJECTION, POWDER, LYOPHILIZED, FOR SOLUTION INTRAVENOUS at 07:59

## 2022-10-07 RX ADMIN — INSULIN LISPRO 2 UNITS: 100 INJECTION, SOLUTION INTRAVENOUS; SUBCUTANEOUS at 11:47

## 2022-10-07 RX ADMIN — ARFORMOTEROL TARTRATE 15 MCG: 15 SOLUTION RESPIRATORY (INHALATION) at 17:15

## 2022-10-07 ASSESSMENT — PAIN DESCRIPTION - ORIENTATION
ORIENTATION: RIGHT;LEFT
ORIENTATION: RIGHT;LEFT

## 2022-10-07 ASSESSMENT — PAIN SCALES - WONG BAKER
WONGBAKER_NUMERICALRESPONSE: 0
WONGBAKER_NUMERICALRESPONSE: 0

## 2022-10-07 ASSESSMENT — PAIN DESCRIPTION - DESCRIPTORS
DESCRIPTORS: ACHING;DISCOMFORT
DESCRIPTORS: ACHING;DISCOMFORT

## 2022-10-07 ASSESSMENT — PAIN SCALES - GENERAL
PAINLEVEL_OUTOF10: 5
PAINLEVEL_OUTOF10: 5

## 2022-10-07 ASSESSMENT — PAIN DESCRIPTION - ONSET: ONSET: GRADUAL

## 2022-10-07 ASSESSMENT — PAIN DESCRIPTION - LOCATION
LOCATION: FOOT
LOCATION: FOOT

## 2022-10-07 ASSESSMENT — PAIN - FUNCTIONAL ASSESSMENT: PAIN_FUNCTIONAL_ASSESSMENT: ACTIVITIES ARE NOT PREVENTED

## 2022-10-07 ASSESSMENT — PAIN DESCRIPTION - PAIN TYPE: TYPE: ACUTE PAIN

## 2022-10-07 ASSESSMENT — PAIN DESCRIPTION - FREQUENCY: FREQUENCY: INTERMITTENT

## 2022-10-07 ASSESSMENT — ENCOUNTER SYMPTOMS: SHORTNESS OF BREATH: 1

## 2022-10-07 NOTE — PROGRESS NOTES
Pharmacy Consultation Note  (Antibiotic Dosing and Monitoring)    Initial consult date: 10/6/22  Consulting physician/provider: RASHARD Zelaya - CNP  Drug: Vancomycin  Indication: UTI    Age/  Gender Height Weight IBW  Allergy Information   71 y.o./female 5' (152.4 cm) 200 lb (90.7 kg)     Ideal body weight: 45.5 kg (100 lb 4.9 oz)  Adjusted ideal body weight: 63.6 kg (140 lb 3 oz)   Ciprofloxacin and Codeine      Renal Function:  Recent Labs     10/05/22  1119 10/06/22  0500 10/07/22  0444   BUN 26* 25* 24*   CREATININE 1.3* 1.1* 1.0       Intake/Output Summary (Last 24 hours) at 10/7/2022 0908  Last data filed at 10/7/2022 0529  Gross per 24 hour   Intake 3053.2 ml   Output 900 ml   Net 2153.2 ml       Vancomycin Monitoring:  Trough:  No results for input(s): VANCOTROUGH in the last 72 hours. Random:  No results for input(s): VANCORANDOM in the last 72 hours. Recent Labs     10/05/22  1119   BLOODCULT2 24 Hours no growth        Historical Cultures:  Organism   Date Value Ref Range Status   10/05/2022 Escherichia coli (A)  Final     Recent Labs     10/05/22  1223   BC 24 Hours no growth     Recent vancomycin administrations                     vancomycin (VANCOCIN) 750 mg in dextrose 5 % 250 mL IVPB (mg) 750 mg New Bag 10/07/22 0759     750 mg New Bag 10/06/22 2016     750 mg New Bag  0955             Assessment:  Patient is a 66 y.o. female who has been initiated on vancomycin  Estimated Creatinine Clearance: 51 mL/min (based on SCr of 1 mg/dL). To dose vancomycin, pharmacy will be utilizing [employer] calculation software for goal AUC/DARELL 400-600 mg/L-hr    Plan: Will continue vancomycin 750 mg IV every 12 hours  Will check vancomycin level tonight @ 1930. Hold dose if level > 20 mcg/mL  Will continue to monitor renal function   Pharmacy to follow    Elias Posada PharmD, BCPS 10/7/2022 9:15 AM   Ext: 5680    Addendum    Vancomycin has been discontinued; pharmacy will sign-off.   Please reconsult if needed.     Thank you,  Han Brantley, PharmD, BCPS 10/7/2022 9:39 AM

## 2022-10-07 NOTE — PROGRESS NOTES
Pulmonary/Critical Care Progress Note    We are following patient for urinary tract infection, sepsis, improving right lower lobe atelectasis, HFpEF, obstructive sleep apnea, history of breast cancer, acute superimposed on chronic respiratory failure with COPD/hypercapnia, atrial fibrillation, history of cirrhosis    SUBJECTIVE:  The patient is awake and alert and did well with her BiPAP last night. Today's chest x-ray shows improvement in the atelectatic areas of the right base. 1 can now see the hemidiaphragm on the right without difficulty. Left side is unremarkable although could have some minor atelectasis. Patient is tolerating a nectar thick diet. We will see whether this can be advanced further. She was up in a chair yesterday    Her fever has improved with the initiation of cefepime and vancomycin. However, since she is growing E. coli, there is no further indication for vancomycin which we will discontinue. MEDICATIONS:   vancomycin  750 mg IntraVENous Q12H    insulin lispro  0-8 Units SubCUTAneous 4x Daily AC & HS    apixaban  5 mg Oral BID    cefepime  2,000 mg IntraVENous Q12H    insulin glargine  13 Units SubCUTAneous BID    atorvastatin  20 mg Oral Nightly    Arformoterol Tartrate  15 mcg Nebulization BID    budesonide  0.5 mg Nebulization BID    aspirin  81 mg Oral Daily      lactated ringers 50 mL/hr at 10/07/22 8045    norepinephrine      dextrose       glucose, dextrose bolus **OR** dextrose bolus, glucagon (rDNA), dextrose, perflutren lipid microspheres, LORazepam, acetaminophen      REVIEW OF SYSTEMS:  Constitutional: Denies fever, weight loss, night sweats, and fatigue  Skin: Denies pigmentation, dark lesions, and rashes   HEENT: Denies hearing loss, tinnitus, ear drainage, epistaxis, sore throat, and hoarseness. Cardiovascular: Denies palpitations, chest pain, and chest pressure. Respiratory: Denies cough, dyspnea at rest, hemoptysis, apnea, and choking.   Gastrointestinal: Denies nausea, vomiting, poor appetite, diarrhea, heartburn or reflux  Genitourinary: Denies dysuria, frequency, urgency or hematuria  Musculoskeletal: Denies myalgias, muscle weakness, and bone pain  Neurological: Denies dizziness, vertigo, headache, and focal weakness  Psychological: Denies anxiety and depression  Endocrine: Denies heat intolerance and cold intolerance  Hematopoietic/Lymphatic: Denies bleeding problems and blood transfusions    OBJECTIVE:  Vitals:    10/07/22 0800   BP: 138/78   Pulse: 93   Resp: 21   Temp: 100 °F (37.8 °C)   SpO2: 97%     FiO2 : 35 %  O2 Flow Rate (L/min): 2 L/min  O2 Device: PAP (positive airway pressure) (14/6)    PHYSICAL EXAM:  Constitutional: Fever is less (approximately 100 °F), no chills, no diaphoresis  Skin: No skin rash, no skin breakdown  HEENT: Unremarkable  Neck: No JVD, lymphadenopathy, thyromegaly  Cardiovascular: S1, S2 regular. No S3 or rubs noted  Respiratory: Few crackles right base, otherwise clear. Few crackles left base as well but otherwise clear on left  Gastrointestinal: Soft, obese, nontender  Genitourinary: No CVA tenderness  Extremities: No clubbing, cyanosis, or edema  Neurological: Awake, alert, oriented x3.   No evidence of focal motor or sensory deficits  Psychological: Appropriate affect    LABS:  WBC   Date Value Ref Range Status   10/07/2022 3.7 (L) 4.5 - 11.5 E9/L Final   10/06/2022 5.5 4.5 - 11.5 E9/L Final   10/05/2022 9.9 4.5 - 11.5 E9/L Final     Hemoglobin   Date Value Ref Range Status   10/07/2022 9.1 (L) 11.5 - 15.5 g/dL Final   10/06/2022 9.3 (L) 11.5 - 15.5 g/dL Final   10/05/2022 10.9 (L) 11.5 - 15.5 g/dL Final     Hematocrit   Date Value Ref Range Status   10/07/2022 31.5 (L) 34.0 - 48.0 % Final   10/06/2022 32.5 (L) 34.0 - 48.0 % Final   10/05/2022 37.4 34.0 - 48.0 % Final     MCV   Date Value Ref Range Status   10/07/2022 93.2 80.0 - 99.9 fL Final   10/06/2022 93.4 80.0 - 99.9 fL Final   10/05/2022 93.7 80.0 - 99.9 fL Final Platelets   Date Value Ref Range Status   10/07/2022 138 130 - 450 E9/L Final   10/06/2022 134 130 - 450 E9/L Final   10/05/2022 193 130 - 450 E9/L Final     Sodium   Date Value Ref Range Status   10/07/2022 141 132 - 146 mmol/L Final   10/06/2022 142 132 - 146 mmol/L Final   10/05/2022 138 132 - 146 mmol/L Final     Potassium   Date Value Ref Range Status   10/07/2022 3.7 3.5 - 5.0 mmol/L Final   10/06/2022 4.0 3.5 - 5.0 mmol/L Final   09/08/2022 4.0 3.5 - 5.0 mmol/L Final     Potassium reflex Magnesium   Date Value Ref Range Status   10/05/2022 4.2 3.5 - 5.0 mmol/L Final   10/03/2022 3.8 3.5 - 5.0 mmol/L Final   09/03/2022 3.5 3.5 - 5.0 mmol/L Final     Chloride   Date Value Ref Range Status   10/07/2022 103 98 - 107 mmol/L Final   10/06/2022 103 98 - 107 mmol/L Final   10/05/2022 94 (L) 98 - 107 mmol/L Final     CO2   Date Value Ref Range Status   10/07/2022 34 (H) 22 - 29 mmol/L Final   10/06/2022 33 (H) 22 - 29 mmol/L Final   10/05/2022 34 (H) 22 - 29 mmol/L Final     BUN   Date Value Ref Range Status   10/07/2022 24 (H) 6 - 23 mg/dL Final   10/06/2022 25 (H) 6 - 23 mg/dL Final   10/05/2022 26 (H) 6 - 23 mg/dL Final     Creatinine   Date Value Ref Range Status   10/07/2022 1.0 0.5 - 1.0 mg/dL Final   10/06/2022 1.1 (H) 0.5 - 1.0 mg/dL Final   10/05/2022 1.3 (H) 0.5 - 1.0 mg/dL Final     Glucose   Date Value Ref Range Status   10/07/2022 149 (H) 74 - 99 mg/dL Final   10/06/2022 134 (H) 74 - 99 mg/dL Final   10/05/2022 309 (H) 74 - 99 mg/dL Final     Calcium   Date Value Ref Range Status   10/07/2022 8.5 (L) 8.6 - 10.2 mg/dL Final   10/06/2022 8.4 (L) 8.6 - 10.2 mg/dL Final   10/05/2022 9.1 8.6 - 10.2 mg/dL Final     Total Protein   Date Value Ref Range Status   10/07/2022 5.3 (L) 6.4 - 8.3 g/dL Final   10/06/2022 5.2 (L) 6.4 - 8.3 g/dL Final   10/05/2022 6.7 6.4 - 8.3 g/dL Final     Albumin   Date Value Ref Range Status   10/07/2022 2.8 (L) 3.5 - 5.2 g/dL Final   10/06/2022 3.0 (L) 3.5 - 5.2 g/dL Final 10/05/2022 3.7 3.5 - 5.2 g/dL Final     Total Bilirubin   Date Value Ref Range Status   10/07/2022 0.5 0.0 - 1.2 mg/dL Final   10/06/2022 0.5 0.0 - 1.2 mg/dL Final   10/05/2022 0.7 0.0 - 1.2 mg/dL Final     Alkaline Phosphatase   Date Value Ref Range Status   10/07/2022 71 35 - 104 U/L Final   10/06/2022 68 35 - 104 U/L Final   10/05/2022 92 35 - 104 U/L Final     AST   Date Value Ref Range Status   10/07/2022 8 0 - 31 U/L Final   10/06/2022 8 0 - 31 U/L Final   10/05/2022 7 0 - 31 U/L Final     ALT   Date Value Ref Range Status   10/07/2022 7 0 - 32 U/L Final   10/06/2022 5 0 - 32 U/L Final   10/05/2022 5 0 - 32 U/L Final     GFR Non-   Date Value Ref Range Status   10/07/2022 54 >=60 mL/min/1.73 Final     Comment:     Chronic Kidney Disease: less than 60 ml/min/1.73 sq.m. Kidney Failure: less than 15 ml/min/1.73 sq.m. Results valid for patients 18 years and older. 10/06/2022 49 >=60 mL/min/1.73 Final     Comment:     Chronic Kidney Disease: less than 60 ml/min/1.73 sq.m. Kidney Failure: less than 15 ml/min/1.73 sq.m. Results valid for patients 18 years and older. 10/05/2022 40 >=60 mL/min/1.73 Final     Comment:     Chronic Kidney Disease: less than 60 ml/min/1.73 sq.m. Kidney Failure: less than 15 ml/min/1.73 sq.m. Results valid for patients 18 years and older.        GFR    Date Value Ref Range Status   10/07/2022 >60  Final   10/06/2022 59  Final   10/05/2022 49  Final     Magnesium   Date Value Ref Range Status   10/07/2022 1.8 1.6 - 2.6 mg/dL Final   10/06/2022 1.8 1.6 - 2.6 mg/dL Final   09/03/2022 2.0 1.6 - 2.6 mg/dL Final     Phosphorus   Date Value Ref Range Status   10/07/2022 2.8 2.5 - 4.5 mg/dL Final   10/06/2022 2.6 2.5 - 4.5 mg/dL Final   07/27/2022 3.9 2.5 - 4.5 mg/dL Final     Recent Labs     10/07/22  0500   PH 7.387   PO2 138.8*   PCO2 56.0*   HCO3 32.9*   BE 6.8*   O2SAT 98.2       RADIOLOGY:  XR CHEST PORTABLE   Final Result Redemonstration of an enlarged cardiac silhouette along with right basilar   pleural and parenchymal disease. CT ABDOMEN PELVIS W IV CONTRAST Additional Contrast? None   Final Result   Subtle suggestion of hepatic steatosis and cirrhosis including portal venous   hypertension with splenomegaly. Small volume abdominal ascites and mild anasarca. Bilateral lower lobe infiltrates and pleural effusions, right more than left. Pneumonia is suspected. Stable benign left adrenal adenoma. No imaging follow-up is required. Chronic left renal scarring probably due to remote infection. Small collection of gas within the endometrial cavity presumably   iatrogenically introduced. No definite uterine pathology is appreciated. No   focal uterine fluid collections. The uterus could be further evaluated   sonographically if clinically indicated. Stool-filled colon particularly on the right suggesting constipation. XR CHEST PORTABLE   Final Result   Cardiomegaly and borderline pulmonary vascular congestion. Suspected   developing right basilar atelectasis and possible pleural effusion. XR CHEST PORTABLE   Final Result   Enlarged cardiac silhouette. Parahilar peribronchial thickening and mild   pulmonary interstitial changes. Right basilar atelectasis and elevated right   hemidiaphragm. PROBLEM LIST:  Principal Problem:    Sepsis ([address]  Resolved Problems:    * No resolved hospital problems.  *      IMPRESSION:  Possible sepsis, improving  Urinary tract infection growing E. coli, sensitive to cefepime  Obesity  Obstructive sleep apnea  Alveolar hypoventilation secondary to body habitus  SHANTANU, improving  Minimal left vocal cord dysfunction but no obvious aspiration    PLAN:  Try to advance diet with the help of speech therapy  Continue mobilization with the help of PT and OT  Discontinue vancomycin  Continue cefepime  Continue Eliquis  Check labs chest x-ray in a.m.  Decrease IV fluids    ATTESTATION:  ICU Staff Physician note of personal involvement in Care  As the attending physician, I certify that I personally reviewed the patients history and personally examined the patient to confirm the physical findings described above,  And that I reviewed the relevant imaging studies and available reports. I also discussed the differential diagnosis and all of the proposed management plans with the patient and individuals accompanying the patient to this visit. They had the opportunity to ask questions about the proposed management plans and to have those questions answered. This patient has a high probability of sudden, clinically significant deterioration, which requires the highest level of physician preparedness to intervene urgently. I managed/supervised life or organ supporting interventions that required frequent physician assessment. I devoted my full attention to the direct care of this patient for the amount of time indicated below. Time I spent with the family or surrogate(s) is included only if the patient was incapable of providing the necessary information or participating in medical decisions - Time devoted to teaching and to any procedures I billed separately is not included.     CRITICAL CARE TIME:  34 minutes    Electronically signed by Adri Abel MD on 10/7/2022 at 9:22 AM

## 2022-10-07 NOTE — PROGRESS NOTES
SPEECH LANGUAGE PATHOLOGY  DAILY PROGRESS NOTE        PATIENT NAME:  Deanna Carpio      :  1950          TODAY'S DATE:  10/7/2022 ROOM:  [facility] [address]    Pt seen for f/u dysphagia management this date in room; RN reported good toleration of current diet and pt reported this to have been going down well when asked. SLP reviewed current diet rec for puree, nectar thick liquids as well as educated pt re: comp swallow strategies (small bites/sips, alt solids and liquids, no straw); pt initially stated that she did not recall strategies but then indicated that she did. Better recall noted following short delay of review w/ SLP. Pt politely declined PO intake during session this date. SLP encouraged pt for use of swallow strategies w PO intake. Pt presented with harsh vocal quality this date; however, when asked, pt reported that this is not new for pt. Pt left in room w/ callbell w/I reach. Plan to continue per ST POC.        CPT code(s) 11368  dysphagia tx  Total minutes :  10 minutes

## 2022-10-07 NOTE — PROGRESS NOTES
Progress Note  Date:10/7/2022       Room:[place] [address]  Patient Name:Janneth Javier     YOB: 1950     Age:71 y.o. Subjective    Subjective:  Symptoms:  She reports shortness of breath, malaise and weakness. Activity level: Impaired due to weakness. Pain:  She reports no pain. Review of Systems   Constitutional:  Negative for fever. Respiratory:  Positive for shortness of breath. Neurological:  Positive for weakness. All other systems reviewed and are negative. Objective         Vitals Last 24 Hours:  TEMPERATURE:  Temp  Av.9 °F (37.7 °C)  Min: 99 °F (37.2 °C)  Max: 100.6 °F (38.1 °C)  RESPIRATIONS RANGE: Resp  Av.9  Min: 15  Max: 35  PULSE OXIMETRY RANGE: SpO2  Av.9 %  Min: 91 %  Max: 100 %  PULSE RANGE: Pulse  Av.6  Min: 78  Max: 120  BLOOD PRESSURE RANGE: Systolic (73KCY), GLU:992 , Min:89 , LPD:329   ; Diastolic (20TIN), TGX:59, Min:26, Max:100    I/O (24Hr): Intake/Output Summary (Last 24 hours) at 10/7/2022 1436  Last data filed at 10/7/2022 1420  Gross per 24 hour   Intake 5066.56 ml   Output 1600 ml   Net 3466.56 ml     Objective:  General Appearance:  Ill-appearing. Vital signs: (most recent): Blood pressure (!) 136/56, pulse (!) 107, temperature 100 °F (37.8 °C), temperature source Bladder, resp. rate 21, height 5' (1.524 m), weight 200 lb (90.7 kg), SpO2 92 %, not currently breastfeeding. Vital signs are normal.  No fever. Output: Producing urine and producing stool. Lungs:  Normal effort and normal respiratory rate. There are decreased breath sounds. Heart: Normal rate. Skin:  Warm and dry.     Labs/Imaging/Diagnostics    Labs:  CBC:  Recent Labs     10/05/22  1119 10/06/22  0500 10/07/22  0444   WBC 9.9 5.5 3.7*   RBC 3.99 3.48* 3.38*   HGB 10.9* 9.3* 9.1*   HCT 37.4 32.5* 31.5*   MCV 93.7 93.4 93.2   RDW 15.5* 15.4* 15.5*    134 138     CHEMISTRIES:  Recent Labs     10/05/22  1119 10/06/22  0500 10/07/22  7674   NA 138 142 141   K 4.2 4.0 3.7   CL 94* 103 103   CO2 34* 33* 34*   BUN 26* 25* 24*   CREATININE 1.3* 1.1* 1.0   GLUCOSE 309* 134* 149*   PHOS  --  2.6 2.8   MG  --  1.8 1.8     PT/INR:No results for input(s): PROTIME, INR in the last 72 hours. APTT:No results for input(s): APTT in the last 72 hours. LIVER PROFILE:  Recent Labs     10/05/22  1119 10/06/22  0500 10/07/22  0444   AST 7 8 8   ALT 5 5 7   BILITOT 0.7 0.5 0.5   ALKPHOS 92 68 71       Imaging Last 24 Hours:  CT ABDOMEN PELVIS W IV CONTRAST Additional Contrast? None    Result Date: 10/6/2022  EXAMINATION: CT OF THE ABDOMEN AND PELVIS WITH CONTRAST 10/6/2022 11:33 am TECHNIQUE: CT of the abdomen and pelvis was performed with the administration of intravenous contrast. Multiplanar reformatted images are provided for review. Automated exposure control, iterative reconstruction, and/or weight based adjustment of the mA/kV was utilized to reduce the radiation dose to as low as reasonably achievable. COMPARISON: 07/05/2022 abdomen/pelvis CT. HISTORY: ORDERING SYSTEM PROVIDED HISTORY: ? intraabdominal infection, elevated temps TECHNOLOGIST PROVIDED HISTORY: Reason for exam:->? intraabdominal infection, elevated temps Additional Contrast?->None FINDINGS: Lower Chest: There are small bilateral pleural effusions, right greater than left. There is consolidation at the posterior lung bases, right greater than left. Cardiac size is enlarged. There is a small pericardial effusion. Organs: There is subtle nodular contour to the hepatic periphery. The liver is slightly hypodense. The spleen is enlarged. The right adrenal gland is normal.  There is a 9 mm left adrenal lesion measuring 50 Hounsfield units. This measured -1 Hounsfield unit on previous noncontrast CT. There are surgical clips in the gallbladder fossa. There is left cortical renal thinning superior pole, unchanged. The kidneys are otherwise unremarkable. No hydronephrosis. GI/Bowel:  The stomach is not distended but is grossly unremarkable. There are some mildly distended fluid-filled loops of small bowel in the left abdomen but no bowel wall thickening or obstruction. The colon is stool-filled particularly on the right but there is no evidence of colonic obstruction or mass. No evidence of appendicitis. Pelvis: There is a Diaz catheter within the urinary bladder which is largely decompressed and not well evaluated. There is a small gas collection within the endometrial cavity. This may have been iatrogenically introduced. The uterus is otherwise unremarkable. No adnexal masses. Peritoneum/Retroperitoneum: There is atherosclerotic calcification of the abdominal aorta, splenic and iliac arteries but no abdominal aortic aneurysm. No abdominal or pelvic lymphadenopathy. There is a small volume of abdominal ascites most notably in the right upper quadrant Bones/Soft Tissues: There is mild lower thoracic and lumbar spondylosis and dextroconvex curvature. There is multilevel mild and moderate lower thoracic and lumbar disc disease. There is induration of the subcutaneous and to a lesser degree the mesenteric fat. Subtle suggestion of hepatic steatosis and cirrhosis including portal venous hypertension with splenomegaly. Small volume abdominal ascites and mild anasarca. Bilateral lower lobe infiltrates and pleural effusions, right more than left. Pneumonia is suspected. Stable benign left adrenal adenoma. No imaging follow-up is required. Chronic left renal scarring probably due to remote infection. Small collection of gas within the endometrial cavity presumably iatrogenically introduced. No definite uterine pathology is appreciated. No focal uterine fluid collections. The uterus could be further evaluated sonographically if clinically indicated. Stool-filled colon particularly on the right suggesting constipation.      XR CHEST PORTABLE    Result Date: 10/7/2022  EXAMINATION: ONE XRAY VIEW OF THE CHEST 10/7/2022 6:03 am COMPARISON: 10/06/2022. HISTORY: ORDERING SYSTEM PROVIDED HISTORY: sob TECHNOLOGIST PROVIDED HISTORY: Reason for exam:->sob FINDINGS: The cardiac silhouette is enlarged. There is consolidation and/or collapse in the right lung base. There is also a right pleural effusion. These findings appear relatively stable. Redemonstration of an enlarged cardiac silhouette along with right basilar pleural and parenchymal disease. XR CHEST PORTABLE    Result Date: 10/6/2022  EXAMINATION: ONE XRAY VIEW OF THE CHEST 10/6/2022 5:31 am COMPARISON: 5 October 2022 HISTORY: ORDERING SYSTEM PROVIDED HISTORY: sob TECHNOLOGIST PROVIDED HISTORY: Reason for exam:->sob FINDINGS: Opacity at the right base I suspect relates to developing atelectasis with possible pleural effusion as well. Cardiomediastinal silhouette is not clearly changed with likely mild cardiomegaly present. Pulmonary vascularity is borderline congested. Cardiomegaly and borderline pulmonary vascular congestion. Suspected developing right basilar atelectasis and possible pleural effusion. Assessment//Plan           Hospital Problems             Last Modified POA    * (Principal) Sepsis ([address] 10/5/2022 Yes     Assessment:    Condition: In stable condition. (improved). Plan:   Consults: pulmonology. Regular diet. Start antibiotics. (Continue antibiotics  Continue critical care management  Will follow).      Electronically signed by Shelli Rinaldi DO on 10/7/22 at 2:36 PM EDT

## 2022-10-07 NOTE — PROGRESS NOTES
[address] [facility]  [facility] [place]. [place]        Date:10/7/2022                                                  Patient Name: Ap Lutz    MRN: [id number]    : 1950    Room: [place] [address]     Evaluating OT: Johnnie Bernadette OTR/L #[id number]     Referring Provider and Specific Provider Orders/Date:      10/06/22 151  OT eval and treat  Start:  10/06/22 1515,   End:  10/06/22 1515,   ONE TIME,   Standing Count:  1 Occurrences,   Azar Byrne MD      Placement Recommendation: Subacute Rehab        Diagnosis:   1. Septicemia ([address]    2. Acute respiratory failure with hypoxia and hypercapnia (HCC)    3.  Urinary tract infection in female         Surgery:  None     Pertinent Medical History:       Past Medical History:   Diagnosis Date    A-fib ([address]     Acute on chronic congestive heart failure (HCC)     Anxiety     Asthma     CAD (coronary artery disease) 2016    Cancer ([facility] [address]  breast ca 2006    right lumpectomy    Chronic kidney disease     nephrolithiasis    Depression     Diabetes mellitus ([address]     H/O mammogram     Hx MRSA infection     toe infection 2012    Hyperlipidemia     Hypertension     Lateral epicondylitis     SERENA on CPAP     Parkinson's disease ([address]     Tubal ligation status          Past Surgical History:   Procedure Laterality Date    BREAST LUMPECTOMY      BREAST REDUCTION SURGERY      CARDIAC CATHETERIZATION  2014    Dr. Hernandez Erickson  2022    Dr. Ana Maria Solano  7/29/15    CT GUIDED CHEST TUBE  2022    CT GUIDED CHEST TUBE 2022 Amarilis Ruiz MD [facility]    ENDOSCOPY, COLON, DIAGNOSTIC  7/19/15    GALLBLADDER SURGERY      LUMBAR LAMINECTOMY      TOE AMPUTATION      TONSILLECTOMY      UPPER GASTROINTESTINAL ENDOSCOPY      UPPER GASTROINTESTINAL ENDOSCOPY N/A 2022    EGD ESOPHAGOGASTRODUODENOSCOPY performed by Disha Campbell MD at [facility]      Precautions:  Fall Risk, O2      Assessment of current deficits    [x] Functional mobility  [x]ADLs  [x] Strength               []Cognition    [x] Functional transfers   [x] IADLs         [x] Safety Awareness   [x]Endurance    [] Fine Coordination              [x] Balance      [] Vision/perception   []Sensation     []Gross Motor Coordination  [] ROM  [] Delirium                   [] Motor Control     OT PLAN OF CARE   OT POC based on physician orders, patient diagnosis and results of clinical assessment    Frequency/Duration 1-3 days/wk for 2 weeks PRN     Specific OT Treatment Interventions to include:   * Instruction/training on adapted ADL techniques and AE recommendations to increase functional independence within precautions       * Training on energy conservation strategies, correct breathing pattern and techniques to improve independence/tolerance for self-care routine  * Functional transfer/mobility training/DME recommendations for increased independence, safety, and fall prevention  * Patient/Family education to increase follow through with safety techniques and functional independence  * Recommendation of environmental modifications for increased safety with functional transfers/mobility and ADLs  * Therapeutic exercise to improve motor endurance, ROM, and functional strength for ADLs/functional transfers  * Therapeutic activities to facilitate/challenge dynamic balance, stand tolerance for increased safety and independence with ADLs  * Therapeutic activities to facilitate gross/fine motor skills for increased independence with ADLs  * Positioning to improve skin integrity, interaction with environment and functional independence    Recommended Adaptive Equipment: TBD     Home Living: Pt presented from skilled nursing facility where patient resides.         Equipment owned: nursing home equipment     Prior Level of Function: Pt reports being mostly independent with ADLs but has assist as needed ; ambulated independently with walker. Uses wheelchair for distance mobility. Pain Level: pt denied pain; Nursing notified. Cognition: A&O: 4/4; Follows 2-3 step directions   Memory: fair    Sequencing: fair    Problem solving: fair    Judgement/safety: fair     [facility]   AM-PAC Daily Activity Inpatient   How much help for putting on and taking off regular lower body clothing?: Total  How much help for Bathing?: A Lot  How much help for Toileting?: Total  How much help for putting on and taking off regular upper body clothing?: A Lot  How much help for taking care of personal grooming?: A Lot  How much help for eating meals?: A Little  AM-[facility] Inpatient Daily Activity Raw Score: 11  AM-PAC Inpatient ADL T-Scale Score : 29.04  ADL Inpatient CMS 0-100% Score: 70.42  ADL Inpatient CMS G-Code Modifier : CL     Functional Assessment:    Initial Eval Status  Date: 10/7/22   Treatment Status  Date: STGs = LTGs  Time frame: 10-14 days   Feeding Supervision     Independent    Grooming Minimal Assist    Supervision    UB Dressing Moderate Assist    Supervision    LB Dressing Dependent     Minimal Assist    Bathing Moderate/Maximal Assist     Minimal Assist    Toileting Dependent   For mgmt of genao catheter     Moderate Assist    Bed Mobility  Supine to sit: Minimal Assist  Sit to supine: Not Assessed     Supine to sit: Independent   Sit to supine: Independent    Functional Transfers Sit to stand: Minimal Assist   Stand to sit: Minimal Assist     Transfer training with verbal cues for hand placement throughout session to improve safety. Independent   Functional Mobility Minimal Assist with wheeled walker to improve balance short distances at bedside, verbal cues for walker sequence and safety.      Moderate [place] with use of wheeled walker    Balance Sitting:     Static: fair plus    Dynamic: fair  Standing: fair with walker     Sitting:     Static: good Dynamic: good  Standing: fair plus with walker   Activity Tolerance fair    Increase standing tolerance >3 minutes for improved engagement with functional transfers and indep in ADLs     Visual/  Perceptual Glasses: Yes; not present at bedside     Reports changes in vision since admission: No      NA      Hand Dominance: Right      AROM (PROM) Strength Additional Info:  Goal:   RUE  WFL 3+/5 good  and wfl FMC/dexterity noted during ADL tasks   Improve overall RUE strength  for participation in functional tasks   LUE WFL 3+/5 good  and wfl FMC/dexterity noted during ADL tasks   Improve overall LUE strength  for participation in functional tasks     Hearing: [facility]   Sensation:  No c/o numbness or tingling  Tone:  WFL   Edema: None     Vitals: 2.5 L   HR at rest: 100 bpm HR with activity: 110-140s bpm HR at end of session: 103 bpm   SpO2 at rest: 99% SpO2 with activity: 95% SpO2 at end of session: 95%   BP at rest:  BP with activity:  BP at end of session:      Comments: RN cleared patient for OT. Upon arrival patient in supine. Therapist facilitated and instructed pt on adapted  techniques & compensatory strategies to improve safety and independence with basic ADLs, bed mobility, functional transfers and mobility to allow pt to achieve highest level of independence and safely. Pt demonstrated fair understanding of education & follow through. At end of session, patient was in chair with call light and phone within reach, all lines and tubes intact. Overall, patient demonstrated  decreased independence and safety during completion of ADL tasks. Pt would benefit from continued skilled OT to increase safety and independence with completion of ADL tasks and functional mobility for improved quality of life. Treatment: OT treatment provided this date includes:   Instructions/training on safety, sequencing, and adapted techniques for completion of ADLs.   Facilitated bed mobility with cues for proper body mechanics and sequencing to prepare for ADL completion. Instruction/training on safe functional mobility/transfer techniques including hand and feet placement    Skilled monitoring of O2 sats - see section above     Rehab Potential: Good for established goals. Patient / Family Goal: not stated       Patient and/or family were instructed on functional diagnosis, prognosis/goals and OT plan of care. Demonstrated fair understanding. Eval Complexity: Low    Time In: 8:40 AM   Time Out: 9:10 AM    Total Treatment Time: 10      Min Units   OT Eval Low 97165  X  1    OT Eval Medium 88776      OT Eval High 94599      OT Re-Eval [id number]            ADL/Self Care 35007     Therapeutic Activities 45206  10 1   Therapeutic Ex 49712       Orthotic Management 27763       Manual 06736     Neuro Re-Ed 04545       Non-Billable Time        Evaluation Time additionally includes thorough review of current medical information, gathering information on past medical history/social history and prior level of function, interpretation of standardized testing/informal observation of tasks, assessment of data and development of plan of care and goals.         Evaluating OT: Casandra Graves OTR/L #[id number]

## 2022-10-07 NOTE — CARE COORDINATION
10/7/2022 CM transition of care: ICU, 2lnc, bipap hs. Vladimir Hart, long term bedhold- NO PRECERT to return, needs signed [place]. Pt/ot on consult. Cm following.   Electronically signed by Hyacinth Abdullahi RN-BC on 10/6/2022 at 2:58 PM

## 2022-10-07 NOTE — PROGRESS NOTES
Physical Therapy Initial Evaluation/Plan of Care    Room #:  IC12/IC12-01  Patient Name: Azra John  YOB: 1950  MRN: [id number]    Date of Service: 10/7/2022     Tentative placement recommendation: Subacute rehab  Equipment recommendation: To be determined      Evaluating Physical Therapist: Ashok Flores, PT #[id number]      Specific Provider Orders/Date/Referring Provider :  10/07/22 0645    PT eval and treat  Start:  10/07/22 0645,   End:  10/07/22 0645,   ONE TIME,   Standing Count:  1 Occurrences,   LINH Garcia, APRN - CNP   10/06/22 1515    PT eval and treat  Start:  10/06/22 1515,   End:  10/06/22 1515,   ONE TIME,   Standing Count:  1 Occurrences,   LINH Gonzalez MD     Admitting Diagnosis:   Sepsis ([facility] [address] [A41.9]     hypoxemia  confused and somnolent. She also noticed that she had a fever.   Surgery: none  Visit Diagnoses         Codes    Septicemia ([facility] [address]    -  Primary A41.9    Urinary tract infection in female     N39.0            Patient Active Problem List   Diagnosis    Depression with anxiety    Osteoporosis    Asthma    Hyperlipidemia    Mitral regurgitation    Obstructive sleep apnea syndrome    Psoriasis    Diabetes mellitus ([address]    Parkinson's disease ([address]    Primary hypertension    Microalbuminuria    Morbid obesity (HCC)    RLS (restless legs syndrome)    Generalized weakness    Inability to walk    Hypothyroidism    Chest pain    Acute asthma exacerbation    Asthma exacerbation, mild    Paroxysmal atrial fibrillation (HCC)    Atrial fibrillation with RVR (HCC)    Acute on chronic congestive heart failure ([address]    Coronary artery disease involving native coronary artery of native heart without angina pectoris    Dysphagia    Hepatosplenomegaly    Acute decompensated heart failure (HCC)    Acute diastolic (congestive) heart failure (HCC)    Nonrheumatic tricuspid valve regurgitation    Tobacco abuse    Recurrent syncope    Septic shock ([address]    Seizure-like activity ([facility]    SHANTANU (acute kidney injury) ([facility] [address]    Pancytopenia (HCC)    Thrombocytopenia (HCC)    Encephalopathy    Acute respiratory failure with hypoxia (HCC)    Lactic acidosis    Delirium    Acute on chronic anemia    Pleural effusion, right    Acute respiratory failure with hypoxia and hypercapnia (HCC)    Acute confusion    Chronic diastolic (congestive) heart failure (HCC)    Macrocytosis    Other specified anemias    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[facility] [address]    Puerperal sepsis with acute hypoxic respiratory failure without septic shock (HCC)    Pulmonary HTN (HCC)    Chronic anticoagulation    RVF (right ventricular failure) (HCC)    Metabolic alkalosis    Sepsis (HCC)        ASSESSMENT of Current Deficits Patient exhibits decreased strength, balance, and endurance impairing functional mobility, transfers, gait , gait distance, and tolerance to activity fatigues quickly, good assist with exercises. Patient requires continued skilled physical therapy to address concerns listed above for increased safety and function at discharge. PHYSICAL THERAPY  PLAN OF CARE       Physical therapy plan of care is established based on physician order,  patient diagnosis and clinical assessment    Current Treatment Recommendations:    -Bed Mobility: Lower extremity exercises , Upper extremity exercises , and Trunk control activities   -Sitting Balance: Incorporate reaching activities to activate trunk muscles , Facilitate active trunk muscle engagement , Facilitate postural control in all planes , and Engage in core activities to allow for movement within base of support   -Standing Balance: Perform strengthening exercises in standing to promote motor control with or without upper extremity support   -Transfers: Provide instruction on proper hand and foot position for adequate transfer of weight onto lower extremities and use of gait device if needed and Cues for hand placement, technique and safety.  Provide stabilization to prevent fall   -Gait: Gait training, Standing activities to improve: base of support, weight shift, weight bearing , and Pregait training to emphasize: Sequencing , Device control, Upright, and Safety   -Endurance: Utilize Supervised activities to increase level of endurance to allow for safe functional mobility including transfers and gait  and Use graduated activities to promote good breathing techniques and provide support and education to maximize respiratory function    PT long term treatment goals are located in below grid    Patient and or family understand(s) diagnosis, prognosis, and plan of care. Frequency of treatments: Patient will be seen  daily.          Prior Level of Function: Patient ambulated with wheeled walker short distance, transfers to wheelchair  Rehab Potential: good - for baseline    Past medical history:   Past Medical History:   Diagnosis Date    A-fib ([facility] [address]     Acute on chronic congestive heart failure (HCC)     Anxiety     Asthma     CAD (coronary artery disease) 01/21/2016    Cancer ([facility] [address]  breast ca 2006    right lumpectomy    Chronic kidney disease     nephrolithiasis    Depression     Diabetes mellitus ([facility] [address]     H/O mammogram     Hx MRSA infection     toe infection january 2012    Hyperlipidemia     Hypertension     Lateral epicondylitis     SERENA on CPAP     Parkinson's disease ([facility] [address]     Tubal ligation status      Past Surgical History:   Procedure Laterality Date    BREAST LUMPECTOMY      BREAST REDUCTION SURGERY      CARDIAC CATHETERIZATION  4/28/2014    Dr. Garth Roque  01/11/2022    Dr. Vandana Horner  7/29/15    CT GUIDED CHEST TUBE  7/8/2022    CT GUIDED CHEST TUBE 7/8/2022 Kate Patel MD [facility]    ENDOSCOPY, COLON, DIAGNOSTIC  7/19/15    GALLBLADDER SURGERY      LUMBAR LAMINECTOMY      TOE AMPUTATION      TONSILLECTOMY      UPPER GASTROINTESTINAL ENDOSCOPY      UPPER GASTROINTESTINAL ENDOSCOPY N/A 7/19/2022 EGD ESOPHAGOGASTRODUODENOSCOPY performed by Eric Mccormick MD at [address]:    Precautions:    , falls and O2 ,   history of breast cancer    Social history: Patient lives in a skilled nursing facility [address] owned: [employer], [address], and [employer],       [facility]   How much difficulty turning over in bed?: A Lot  How much difficulty sitting down on / standing up from a chair with arms?: A Little  How much difficulty moving from lying on back to sitting on side of bed?: A Lot  How much help from another person moving to and from a bed to a chair?: A Little  How much help from another person needed to walk in hospital room?: Total  How much help from another person for climbing 3-5 steps with a railing?: Total  AM-PAC Inpatient Mobility Raw Score : 12  AM-PAC Inpatient T-Scale Score : 35.33  Mobility Inpatient CMS 0-100% Score: 68.66  Mobility Inpatient CMS G-Code Modifier : CL    Nursing cleared patient for PT evaluation. The admitting diagnosis and active problem list as listed above have been reviewed prior to the initiation of this evaluation. OBJECTIVE;   Initial Evaluation  Date: 10/7/2022 Treatment Date:     Short Term/ Long Term   Goals   Was pt agreeable to Eval/treatment? Yes  To be met in 5 days   Pain level   0/10        Bed Mobility    Rolling: Not assessed     Supine to sit: Not assessed     Sit to supine: Moderate assist of 1    Scooting:  Moderate assist of 1    Rolling: Supervision     Supine to sit: Supervision     Sit to supine: Supervision     Scooting: Supervision      Transfers Sit to stand: Minimal assist of 1 Cues for hand placement and safety   Sit to stand: Supervision      Ambulation     5 forward/backward steps using  wheeled walker with Minimal assist of 1   for safety and cues for upright posture, safety, and pacing    20 feet using  wheeled walker with Supervision     Stair negotiation: ascended and descended   Not assessed          ROM Within functional limits    Increase range of motion 10% of affected joints    Strength BUE:  refer to OT eval  RLE:  3+/5  LLE:  3+/5  Increase strength in affected mm groups by 1/3 grade   Balance Sitting EOB:  fair elevated surface  Dynamic Standing:  fair wheeled walker   Sitting EOB:  good    Dynamic Standing: good -wheeled walker      Patient is Alert & Oriented x person, place, time, and situation and follows directions    Sensation:  Patient  denies numbness/tingling   Edema:  no   Endurance: fair       Vitals:  2.5 liters high flow nasal cannula   Blood Pressure at rest 138/78 Blood Pressure during session  89/61 in bed   Heart Rate at rest 102 Heart Rate during session 104   SPO2 at rest 96%  SPO2 during session 95%     Patient education  Patient educated on role of Physical Therapy, risks of immobility, safety and plan of care,  importance of mobility while in hospital , ankle pumps, quad set and glut set for edema control, blood clot prevention, positioning for skin integrity and comfort, and proper use/technique of incentive spirometer     Patient response to education:   Pt verbalized understanding Pt demonstrated skill Pt requires further education in this area   Yes Partial Yes      Treatment:  Patient practiced and was instructed/facilitated in the following treatment: Patient in chair, performed exercises. ambulated at bedside, fatigued from sitting in chair limiting increased distance  Sat edge of bed 5 minutes with Minimal assist of 1 to increase dynamic sitting balance and activity tolerance. Returned to bed, performed exercises. Therapeutic Exercises:  ankle pumps, heel raises, heel slide, hip abduction/adduction, straight leg raise, long arc quad, and seated marching  x 5-20 reps. At end of session, patient in bed with alarm call light and phone within reach,  all lines and tubes intact, nursing notified.       Patient would benefit from continued skilled Physical Therapy to improve functional independence and quality of life. Patient's/ family goals   Return to [facility]    Time in  0761  Time out  1021    Total Treatment Time  24 minutes    Evaluation time includes thorough review of current medical information, gathering information on past medical history/social history and prior level of function, completion of standardized testing/informal observation of tasks, assessment of data, and development of Plan of care and goals.      CPT codes:  Low Complexity PT evaluation (15130)  Therapeutic activities (97427)   15 minutes  1 unit(s)  Therapeutic exercises (78503)   9 minutes  1 unit(s)    Vinicius Mckee, PT

## 2022-10-08 ENCOUNTER — APPOINTMENT (OUTPATIENT)
Dept: ULTRASOUND IMAGING | Age: 72
DRG: 871 | End: 2022-10-08
Payer: MEDICARE

## 2022-10-08 ENCOUNTER — APPOINTMENT (OUTPATIENT)
Dept: GENERAL RADIOLOGY | Age: 72
DRG: 871 | End: 2022-10-08
Payer: MEDICARE

## 2022-10-08 LAB
AADO2: 74.8 MMHG
ALBUMIN SERPL-MCNC: 2.5 G/DL (ref 3.5–5.2)
ALP BLD-CCNC: 74 U/L (ref 35–104)
ALT SERPL-CCNC: 5 U/L (ref 0–32)
ANION GAP SERPL CALCULATED.3IONS-SCNC: 4 MMOL/L (ref 7–16)
AST SERPL-CCNC: 7 U/L (ref 0–31)
B.E.: 8.6 MMOL/L (ref -3–3)
BASOPHILS ABSOLUTE: 0.02 E9/L (ref 0–0.2)
BASOPHILS RELATIVE PERCENT: 0.6 % (ref 0–2)
BILIRUB SERPL-MCNC: 0.4 MG/DL (ref 0–1.2)
BUN BLDV-MCNC: 21 MG/DL (ref 6–23)
CALCIUM SERPL-MCNC: 8.5 MG/DL (ref 8.6–10.2)
CHLORIDE BLD-SCNC: 106 MMOL/L (ref 98–107)
CO2: 34 MMOL/L (ref 22–29)
COHB: 0.3 % (ref 0–1.5)
CREAT SERPL-MCNC: 1 MG/DL (ref 0.5–1)
CRITICAL: ABNORMAL
DATE ANALYZED: ABNORMAL
DATE OF COLLECTION: ABNORMAL
EOSINOPHILS ABSOLUTE: 0.04 E9/L (ref 0.05–0.5)
EOSINOPHILS RELATIVE PERCENT: 1.3 % (ref 0–6)
FIO2: 35 %
GFR AFRICAN AMERICAN: >60
GFR NON-AFRICAN AMERICAN: 54 ML/MIN/1.73
GLUCOSE BLD-MCNC: 174 MG/DL (ref 74–99)
HCO3: 34.7 MMOL/L (ref 22–26)
HCT VFR BLD CALC: 28.9 % (ref 34–48)
HEMOGLOBIN: 8.5 G/DL (ref 11.5–15.5)
HHB: 3.1 % (ref 0–5)
IMMATURE GRANULOCYTES #: 0.04 E9/L
IMMATURE GRANULOCYTES %: 1.3 % (ref 0–5)
LAB: ABNORMAL
LYMPHOCYTES ABSOLUTE: 1.03 E9/L (ref 1.5–4)
LYMPHOCYTES RELATIVE PERCENT: 32.8 % (ref 20–42)
Lab: ABNORMAL
MAGNESIUM: 1.9 MG/DL (ref 1.6–2.6)
MCH RBC QN AUTO: 26.8 PG (ref 26–35)
MCHC RBC AUTO-ENTMCNC: 29.4 % (ref 32–34.5)
MCV RBC AUTO: 91.2 FL (ref 80–99.9)
METER GLUCOSE: 174 MG/DL (ref 74–99)
METER GLUCOSE: 196 MG/DL (ref 74–99)
METER GLUCOSE: 257 MG/DL (ref 74–99)
METER GLUCOSE: 279 MG/DL (ref 74–99)
METER GLUCOSE: 292 MG/DL (ref 74–99)
METHB: 0.5 % (ref 0–1.5)
MODE: ABNORMAL
MONOCYTES ABSOLUTE: 0.4 E9/L (ref 0.1–0.95)
MONOCYTES RELATIVE PERCENT: 12.7 % (ref 2–12)
NEUTROPHILS ABSOLUTE: 1.61 E9/L (ref 1.8–7.3)
NEUTROPHILS RELATIVE PERCENT: 51.3 % (ref 43–80)
O2 CONTENT: 13.3 ML/DL
O2 SATURATION: 96.9 % (ref 92–98.5)
O2HB: 96.1 % (ref 94–97)
OPERATOR ID: 2323
PATIENT TEMP: 37 C
PCO2: 57.2 MMHG (ref 35–45)
PDW BLD-RTO: 15 FL (ref 11.5–15)
PEEP/CPAP: 6 CMH2O
PFO2: 2.84 MMHG/%
PH BLOOD GAS: 7.4 (ref 7.35–7.45)
PHOSPHORUS: 3 MG/DL (ref 2.5–4.5)
PLATELET # BLD: 136 E9/L (ref 130–450)
PMV BLD AUTO: 10.9 FL (ref 7–12)
PO2: 99.5 MMHG (ref 75–100)
POTASSIUM SERPL-SCNC: 4 MMOL/L (ref 3.5–5)
PS: 20 CMH20
RBC # BLD: 3.17 E12/L (ref 3.5–5.5)
RI(T): 0.75
SODIUM BLD-SCNC: 144 MMOL/L (ref 132–146)
SOURCE, BLOOD GAS: ABNORMAL
THB: 9.7 G/DL (ref 11.5–16.5)
TIME ANALYZED: 445
TOTAL PROTEIN: 4.8 G/DL (ref 6.4–8.3)
WBC # BLD: 3.1 E9/L (ref 4.5–11.5)

## 2022-10-08 PROCEDURE — 6370000000 HC RX 637 (ALT 250 FOR IP): Performed by: INTERNAL MEDICINE

## 2022-10-08 PROCEDURE — 83735 ASSAY OF MAGNESIUM: CPT

## 2022-10-08 PROCEDURE — 71045 X-RAY EXAM CHEST 1 VIEW: CPT

## 2022-10-08 PROCEDURE — 82805 BLOOD GASES W/O2 SATURATION: CPT

## 2022-10-08 PROCEDURE — 84100 ASSAY OF PHOSPHORUS: CPT

## 2022-10-08 PROCEDURE — 6360000002 HC RX W HCPCS: Performed by: INTERNAL MEDICINE

## 2022-10-08 PROCEDURE — 80053 COMPREHEN METABOLIC PANEL: CPT

## 2022-10-08 PROCEDURE — 2580000003 HC RX 258: Performed by: INTERNAL MEDICINE

## 2022-10-08 PROCEDURE — 94640 AIRWAY INHALATION TREATMENT: CPT

## 2022-10-08 PROCEDURE — 2500000003 HC RX 250 WO HCPCS: Performed by: FAMILY MEDICINE

## 2022-10-08 PROCEDURE — 85025 COMPLETE CBC W/AUTO DIFF WBC: CPT

## 2022-10-08 PROCEDURE — 94660 CPAP INITIATION&MGMT: CPT

## 2022-10-08 PROCEDURE — 6370000000 HC RX 637 (ALT 250 FOR IP): Performed by: FAMILY MEDICINE

## 2022-10-08 PROCEDURE — 2700000000 HC OXYGEN THERAPY PER DAY

## 2022-10-08 PROCEDURE — 2580000003 HC RX 258: Performed by: FAMILY MEDICINE

## 2022-10-08 PROCEDURE — 82962 GLUCOSE BLOOD TEST: CPT

## 2022-10-08 PROCEDURE — 2060000000 HC ICU INTERMEDIATE R&B

## 2022-10-08 PROCEDURE — 36415 COLL VENOUS BLD VENIPUNCTURE: CPT

## 2022-10-08 PROCEDURE — 93971 EXTREMITY STUDY: CPT

## 2022-10-08 PROCEDURE — 93005 ELECTROCARDIOGRAM TRACING: CPT | Performed by: FAMILY MEDICINE

## 2022-10-08 RX ORDER — METOPROLOL SUCCINATE 50 MG/1
100 TABLET, EXTENDED RELEASE ORAL 2 TIMES DAILY
Status: DISCONTINUED | OUTPATIENT
Start: 2022-10-08 | End: 2022-10-10 | Stop reason: HOSPADM

## 2022-10-08 RX ORDER — LOPERAMIDE HYDROCHLORIDE 2 MG/1
2 CAPSULE ORAL 4 TIMES DAILY PRN
COMMUNITY

## 2022-10-08 RX ORDER — DILTIAZEM HYDROCHLORIDE 5 MG/ML
20 INJECTION INTRAVENOUS ONCE
Status: COMPLETED | OUTPATIENT
Start: 2022-10-08 | End: 2022-10-08

## 2022-10-08 RX ORDER — CARBIDOPA AND LEVODOPA 25; 100 MG/1; MG/1
2 TABLET, EXTENDED RELEASE ORAL 3 TIMES DAILY
Status: DISCONTINUED | OUTPATIENT
Start: 2022-10-08 | End: 2022-10-10 | Stop reason: HOSPADM

## 2022-10-08 RX ORDER — BENZONATATE 100 MG/1
100 CAPSULE ORAL 3 TIMES DAILY PRN
Status: ON HOLD | COMMUNITY
End: 2022-10-10 | Stop reason: HOSPADM

## 2022-10-08 RX ADMIN — ASPIRIN 81 MG CHEWABLE TABLET 81 MG: 81 TABLET CHEWABLE at 09:27

## 2022-10-08 RX ADMIN — DILTIAZEM HYDROCHLORIDE 5 MG/HR: 5 INJECTION, SOLUTION INTRAVENOUS at 19:39

## 2022-10-08 RX ADMIN — METOPROLOL SUCCINATE 100 MG: 50 TABLET, EXTENDED RELEASE ORAL at 20:35

## 2022-10-08 RX ADMIN — INSULIN LISPRO 4 UNITS: 100 INJECTION, SOLUTION INTRAVENOUS; SUBCUTANEOUS at 17:42

## 2022-10-08 RX ADMIN — INSULIN GLARGINE 13 UNITS: 100 INJECTION, SOLUTION SUBCUTANEOUS at 09:27

## 2022-10-08 RX ADMIN — CARBIDOPA AND LEVODOPA 2 TABLET: 25; 100 TABLET, EXTENDED RELEASE ORAL at 21:41

## 2022-10-08 RX ADMIN — APIXABAN 5 MG: 5 TABLET, FILM COATED ORAL at 20:35

## 2022-10-08 RX ADMIN — METOPROLOL TARTRATE 25 MG: 25 TABLET, FILM COATED ORAL at 17:44

## 2022-10-08 RX ADMIN — ARFORMOTEROL TARTRATE 15 MCG: 15 SOLUTION RESPIRATORY (INHALATION) at 04:40

## 2022-10-08 RX ADMIN — CEFEPIME 2000 MG: 2 INJECTION, POWDER, FOR SOLUTION INTRAVENOUS at 20:34

## 2022-10-08 RX ADMIN — BUDESONIDE 500 MCG: 0.5 SUSPENSION RESPIRATORY (INHALATION) at 04:40

## 2022-10-08 RX ADMIN — BUDESONIDE 500 MCG: 0.5 SUSPENSION RESPIRATORY (INHALATION) at 16:35

## 2022-10-08 RX ADMIN — ATORVASTATIN CALCIUM 20 MG: 20 TABLET, FILM COATED ORAL at 20:49

## 2022-10-08 RX ADMIN — SODIUM CHLORIDE, POTASSIUM CHLORIDE, SODIUM LACTATE AND CALCIUM CHLORIDE: 600; 310; 30; 20 INJECTION, SOLUTION INTRAVENOUS at 01:47

## 2022-10-08 RX ADMIN — INSULIN LISPRO 4 UNITS: 100 INJECTION, SOLUTION INTRAVENOUS; SUBCUTANEOUS at 22:03

## 2022-10-08 RX ADMIN — INSULIN GLARGINE 13 UNITS: 100 INJECTION, SOLUTION SUBCUTANEOUS at 22:03

## 2022-10-08 RX ADMIN — CEFEPIME 2000 MG: 2 INJECTION, POWDER, FOR SOLUTION INTRAVENOUS at 07:44

## 2022-10-08 RX ADMIN — ARFORMOTEROL TARTRATE 15 MCG: 15 SOLUTION RESPIRATORY (INHALATION) at 16:35

## 2022-10-08 RX ADMIN — DILTIAZEM HYDROCHLORIDE 20 MG: 5 INJECTION, SOLUTION INTRAVENOUS at 18:23

## 2022-10-08 RX ADMIN — APIXABAN 5 MG: 5 TABLET, FILM COATED ORAL at 09:27

## 2022-10-08 ASSESSMENT — PAIN SCALES - GENERAL
PAINLEVEL_OUTOF10: 0

## 2022-10-08 ASSESSMENT — PAIN SCALES - WONG BAKER: WONGBAKER_NUMERICALRESPONSE: 0

## 2022-10-08 NOTE — PROGRESS NOTES
PULMONARY MEDICINE CONSULT    Consult requested by: Dr Jose Licea  Reason for consult : Hypercapnic respiratory failure     HPI  66year old person with PMH of  morbid obesity with BMI 39,  HFpEF, obesity hypoventilation syndrome, SERENA, atrial fibrillation, anxiety, CAD, breast cancer, CKD, acute on chronic respiratory failure, and as described below admitted to hospital for management of acute on chronic hypoxemic respiratory failure and pyelonephritis. Ms Rickey Noriega is back on her home oxygen supplementation  Ms Rickey Noriega is hypertensive and tachycardic, will start metoprolol       BP (!) 123/51   Pulse 98   Temp (P) 98.2 °F (36.8 °C) (Bladder)   Resp 21   Ht 5' (1.524 m)   Wt 200 lb (90.7 kg)   SpO2 98%   BMI 39.06 kg/m²   General: Awake Lethargic  Somnolent  Comatose  Oriented to place, time and person  HEENT: No head lesions, PERRL, EOMI, mouth without lesions, no nasal lesions, no cervical adenopathy palpated  Respiratory: Lungs with equal breath sounds bilaterally, no adventitious sounds auscultated, no accessory muscle use  CV: Regular rate, no murmurs, JVD, no leg edema  Abdomen: Soft, non tender, + bowel sounds, no lesions  Skin: Hydrated, adequate turgor, no rash, capillary refill <2 seconds  Extremities: Muscular strength 4/5 in 4 limbs, moves 4 limbs spontaneously, several toes are amputated, has restless legs, distal pulses present  Neurology: Awake and alert, follows commands, moves 4 limbs on command and spontaneously,  neck is supple, no meningitic signs present. A/P:  1) Acute hypoxemic and hypercapnic respiratory failure secondary to acute diastolic dysfunction and possibly pneumonia in the setting of SERENA.    --BPAP with oxygen supplementation to maintain sats > 89%  --Antimicrobial therapy with cefepime X 7days  --Bronchodilators: Arformoterol/budesonide + Duonebs    2) Sepsis due to pyelonephritis, with leukopenia  --Antimicrobial therapy with cefepime X 7 days    3) Acute kidney injury in a patient with CKD  --Avoid nephrotoxins and dose medications according GFR    4) Diabetes mellitus  --Management with insulin administration     5) Atrial fibrillation  --Rate controlled     6) Anxiety  --PRN Lorazepam <chronic user      DVT prophylaxis with heparin SQ  PO    Due to clinical improvement, we will not follow daily but PRN  Please call intensivist on call if the patient's condition were to worsen. Thank you for allowing us to participate in the care of Ms. Carla Nguyễn. Past Medical History:   Diagnosis Date    A-fib [facility])     Acute on chronic congestive heart failure (HCC)     Anxiety     Asthma     CAD (coronary artery disease) 01/21/2016    Cancer ([address]  breast ca 2006    right lumpectomy    Chronic kidney disease     nephrolithiasis    Depression     Diabetes mellitus ([address]     H/O mammogram     Hx MRSA infection     toe infection january 2012    Hyperlipidemia     Hypertension     Lateral epicondylitis     Morbid obesity (HCC)     SERENA on CPAP     Oxygen dependent     Parkinson's disease ([address]     Tubal ligation status      Family History   Problem Relation Age of Onset    Cancer Mother         Lung Ca    Cancer Father         lung Ca    Diabetes Sister     Heart Disease Sister     Seizures Son     Other Brother         sepsis     Social History     Socioeconomic History    Marital status:       Spouse name: Not on file    Number of children: Not on file    Years of education: Not on file    Highest education level: Not on file   Occupational History    Not on file   Tobacco Use    Smoking status: Never    Smokeless tobacco: Never   Vaping Use    Vaping Use: Never used   Substance and Sexual Activity    Alcohol use: No    Drug use: No    Sexual activity: Never   Other Topics Concern    Not on file   Social History Narrative    Not on file     Social Determinants of Health     Financial Resource Strain: Not on file   Food Insecurity: Not on file   Transportation Needs: Not on file Physical Activity: Not on file   Stress: Not on file   Social Connections: Not on file   Intimate Partner Violence: Not on file   Housing Stability: Not on file     Current Facility-Administered Medications   Medication Dose Route Frequency Provider Last Rate Last Admin    lactated ringers infusion   IntraVENous Continuous Nichol Quintero MD 40 mL/hr at 10/08/22 0557 Rate Verify at 10/08/22 0557    norepinephrine (LEVOPHED) 16 mg in sodium chloride 0.9 % 250 mL infusion  1-100 mcg/min IntraVENous Continuous Reji Fret, APRN - CNP        glucose chewable tablet 16 g  4 tablet Oral PRN Amber Listen Veres, DO        dextrose bolus 10% 125 mL  125 mL IntraVENous PRN Arvid Ruths, DO        Or    dextrose bolus 10% 250 mL  250 mL IntraVENous PRN Arvid Ruths, DO        glucagon (rDNA) injection 1 mg  1 mg SubCUTAneous PRN Amber Listen Veres, DO        dextrose 10 % infusion   IntraVENous Continuous PRN Arvid Ruths, DO        insulin lispro (HUMALOG) injection vial 0-8 Units  0-8 Units SubCUTAneous 4x Daily AC & HS Nichol Quintero MD   4 Units at 10/07/22 2023    apixaban (ELIQUIS) tablet 5 mg  5 mg Oral BID Nichol Quintero MD   5 mg at 10/08/22 4609    perflutren lipid microspheres (DEFINITY) injection 1.65 mg  1.5 mL IntraVENous ONCE PRN America Munoz MD        cefepime (MAXIPIME) 2,000 mg in sodium chloride 0.9 % 50 mL IVPB ([id number])  2,000 mg IntraVENous Q12H America Munoz MD 12.5 mL/hr at 10/08/22 0744 2,000 mg at 10/08/22 0744    insulin glargine (LANTUS) injection vial 13 Units  13 Units SubCUTAneous BID America Munoz MD   13 Units at 10/08/22 0927    atorvastatin (LIPITOR) tablet 20 mg  20 mg Oral Nightly America Munoz MD   20 mg at 10/07/22 2013    LORazepam (ATIVAN) tablet 0.5 mg  0.5 mg Oral Q12H PRN America Munoz MD   0.5 mg at 10/07/22 2308    Arformoterol Tartrate (BROVANA) nebulizer solution 15 mcg  15 mcg Nebulization BID America Munoz MD 15 mcg at 10/08/22 0440    budesonide (PULMICORT) nebulizer suspension 500 mcg  0.5 mg Nebulization BID Mau Shaw MD   500 mcg at 10/08/22 0440    aspirin chewable tablet 81 mg  81 mg Oral Daily Mau Shaw MD   81 mg at 10/08/22 8736    acetaminophen (TYLENOL) tablet 650 mg  650 mg Oral Q6H PRN RASHARD Pedersen - CNP   650 mg at 10/07/22 1901   MEDICATIONS:   insulin lispro  0-8 Units SubCUTAneous 4x Daily AC & HS    apixaban  5 mg Oral BID    cefepime  2,000 mg IntraVENous Q12H    insulin glargine  13 Units SubCUTAneous BID    atorvastatin  20 mg Oral Nightly    Arformoterol Tartrate  15 mcg Nebulization BID    budesonide  0.5 mg Nebulization BID    aspirin  81 mg Oral Daily      lactated ringers 40 mL/hr at 10/08/22 0557    norepinephrine      dextrose       glucose, dextrose bolus **OR** dextrose bolus, glucagon (rDNA), dextrose, perflutren lipid microspheres, LORazepam, acetaminophen    OBJECTIVE:  Vitals:    10/08/22 0900   BP: (!) 123/51   Pulse: 98   Resp: 21   Temp:    SpO2: 98%     FiO2 : 35 %  O2 Flow Rate (L/min): 2 L/min  O2 Device: Nasal cannula        LABS:  WBC   Date Value Ref Range Status   10/08/2022 3.1 (L) 4.5 - 11.5 E9/L Final   10/07/2022 3.7 (L) 4.5 - 11.5 E9/L Final   10/06/2022 5.5 4.5 - 11.5 E9/L Final     Hemoglobin   Date Value Ref Range Status   10/08/2022 8.5 (L) 11.5 - 15.5 g/dL Final   10/07/2022 9.1 (L) 11.5 - 15.5 g/dL Final   10/06/2022 9.3 (L) 11.5 - 15.5 g/dL Final     Hematocrit   Date Value Ref Range Status   10/08/2022 28.9 (L) 34.0 - 48.0 % Final   10/07/2022 31.5 (L) 34.0 - 48.0 % Final   10/06/2022 32.5 (L) 34.0 - 48.0 % Final     MCV   Date Value Ref Range Status   10/08/2022 91.2 80.0 - 99.9 fL Final   10/07/2022 93.2 80.0 - 99.9 fL Final   10/06/2022 93.4 80.0 - 99.9 fL Final     Platelets   Date Value Ref Range Status   10/08/2022 136 130 - 450 E9/L Final   10/07/2022 138 130 - 450 E9/L Final   10/06/2022 134 130 - 450 E9/L Final     Sodium Date Value Ref Range Status   10/08/2022 144 132 - 146 mmol/L Final   10/07/2022 141 132 - 146 mmol/L Final   10/06/2022 142 132 - 146 mmol/L Final     Potassium   Date Value Ref Range Status   10/08/2022 4.0 3.5 - 5.0 mmol/L Final   10/07/2022 3.7 3.5 - 5.0 mmol/L Final   10/06/2022 4.0 3.5 - 5.0 mmol/L Final     Potassium reflex Magnesium   Date Value Ref Range Status   10/05/2022 4.2 3.5 - 5.0 mmol/L Final   10/03/2022 3.8 3.5 - 5.0 mmol/L Final   09/03/2022 3.5 3.5 - 5.0 mmol/L Final     Chloride   Date Value Ref Range Status   10/08/2022 106 98 - 107 mmol/L Final   10/07/2022 103 98 - 107 mmol/L Final   10/06/2022 103 98 - 107 mmol/L Final     CO2   Date Value Ref Range Status   10/08/2022 34 (H) 22 - 29 mmol/L Final   10/07/2022 34 (H) 22 - 29 mmol/L Final   10/06/2022 33 (H) 22 - 29 mmol/L Final     BUN   Date Value Ref Range Status   10/08/2022 21 6 - 23 mg/dL Final   10/07/2022 24 (H) 6 - 23 mg/dL Final   10/06/2022 25 (H) 6 - 23 mg/dL Final     Creatinine   Date Value Ref Range Status   10/08/2022 1.0 0.5 - 1.0 mg/dL Final   10/07/2022 1.0 0.5 - 1.0 mg/dL Final   10/06/2022 1.1 (H) 0.5 - 1.0 mg/dL Final     Glucose   Date Value Ref Range Status   10/08/2022 174 (H) 74 - 99 mg/dL Final   10/07/2022 149 (H) 74 - 99 mg/dL Final   10/06/2022 134 (H) 74 - 99 mg/dL Final     Calcium   Date Value Ref Range Status   10/08/2022 8.5 (L) 8.6 - 10.2 mg/dL Final   10/07/2022 8.5 (L) 8.6 - 10.2 mg/dL Final   10/06/2022 8.4 (L) 8.6 - 10.2 mg/dL Final     Total Protein   Date Value Ref Range Status   10/08/2022 4.8 (L) 6.4 - 8.3 g/dL Final   10/07/2022 5.3 (L) 6.4 - 8.3 g/dL Final   10/06/2022 5.2 (L) 6.4 - 8.3 g/dL Final     Albumin   Date Value Ref Range Status   10/08/2022 2.5 (L) 3.5 - 5.2 g/dL Final   10/07/2022 2.8 (L) 3.5 - 5.2 g/dL Final   10/06/2022 3.0 (L) 3.5 - 5.2 g/dL Final     Total Bilirubin   Date Value Ref Range Status   10/08/2022 0.4 0.0 - 1.2 mg/dL Final   10/07/2022 0.5 0.0 - 1.2 mg/dL Final 10/06/2022 0.5 0.0 - 1.2 mg/dL Final     Alkaline Phosphatase   Date Value Ref Range Status   10/08/2022 74 35 - 104 U/L Final   10/07/2022 71 35 - 104 U/L Final   10/06/2022 68 35 - 104 U/L Final     AST   Date Value Ref Range Status   10/08/2022 7 0 - 31 U/L Final   10/07/2022 8 0 - 31 U/L Final   10/06/2022 8 0 - 31 U/L Final     ALT   Date Value Ref Range Status   10/08/2022 5 0 - 32 U/L Final   10/07/2022 7 0 - 32 U/L Final   10/06/2022 5 0 - 32 U/L Final     GFR Non-   Date Value Ref Range Status   10/08/2022 54 >=60 mL/min/1.73 Final     Comment:     Chronic Kidney Disease: less than 60 ml/min/1.73 sq.m. Kidney Failure: less than 15 ml/min/1.73 sq.m. Results valid for patients 18 years and older. 10/07/2022 54 >=60 mL/min/1.73 Final     Comment:     Chronic Kidney Disease: less than 60 ml/min/1.73 sq.m. Kidney Failure: less than 15 ml/min/1.73 sq.m. Results valid for patients 18 years and older. 10/06/2022 49 >=60 mL/min/1.73 Final     Comment:     Chronic Kidney Disease: less than 60 ml/min/1.73 sq.m. Kidney Failure: less than 15 ml/min/1.73 sq.m. Results valid for patients 18 years and older. GFR    Date Value Ref Range Status   10/08/2022 >60  Final   10/07/2022 >60  Final   10/06/2022 59  Final     Magnesium   Date Value Ref Range Status   10/08/2022 1.9 1.6 - 2.6 mg/dL Final   10/07/2022 1.8 1.6 - 2.6 mg/dL Final   10/06/2022 1.8 1.6 - 2.6 mg/dL Final     Phosphorus   Date Value Ref Range Status   10/08/2022 3.0 2.5 - 4.5 mg/dL Final   10/07/2022 2.8 2.5 - 4.5 mg/dL Final   10/06/2022 2.6 2.5 - 4.5 mg/dL Final     Recent Labs     10/08/22  0445   PH 7.401   PO2 99.5   PCO2 57.2*   HCO3 34.7*   BE 8.6*   O2SAT 96.9         RADIOLOGY:  XR CHEST PORTABLE   Final Result   Redemonstration of interstitial pulmonary edema or pneumonia bilaterally   appearing to have increased in right lung base.          XR CHEST PORTABLE   Final Result   Redemonstration of an enlarged cardiac silhouette along with right basilar   pleural and parenchymal disease. CT ABDOMEN PELVIS W IV CONTRAST Additional Contrast? None   Final Result   Subtle suggestion of hepatic steatosis and cirrhosis including portal venous   hypertension with splenomegaly. Small volume abdominal ascites and mild anasarca. Bilateral lower lobe infiltrates and pleural effusions, right more than left. Pneumonia is suspected. Stable benign left adrenal adenoma. No imaging follow-up is required. Chronic left renal scarring probably due to remote infection. Small collection of gas within the endometrial cavity presumably   iatrogenically introduced. No definite uterine pathology is appreciated. No   focal uterine fluid collections. The uterus could be further evaluated   sonographically if clinically indicated. Stool-filled colon particularly on the right suggesting constipation. XR CHEST PORTABLE   Final Result   Cardiomegaly and borderline pulmonary vascular congestion. Suspected   developing right basilar atelectasis and possible pleural effusion. XR CHEST PORTABLE   Final Result   Enlarged cardiac silhouette. Parahilar peribronchial thickening and mild   pulmonary interstitial changes. Right basilar atelectasis and elevated right   hemidiaphragm. PROBLEM LIST:  Principal Problem:    Sepsis ([address]  Resolved Problems:    * No resolved hospital problems. *    CRITICAL CARE PROGRESS NOTE    The patient's case was discussed in multidisciplinary rounds including critical care specialist, nursing, RT and pharmacy.    Her evaluation is as follows:       Recent Labs     10/08/22  0453      K 4.0      CO2 34*   BUN 21   CREATININE 1.0   GLUCOSE 174*   CALCIUM 8.5*         Recent Labs     10/08/22  0453   WBC 3.1*   RBC 3.17*   HGB 8.5*   HCT 28.9*   MCV 91.2   MCH 26.8   MCHC 29.4*   RDW 15.0      MPV 10.9 Recent Labs     10/05/22  1223 10/06/22  0947   BC 24 Hours no growth 24 Hours no growth       No results for input(s): BLOODCULT2 in the last 72 hours.     24 HR INTAKE/OUTPUT:    Intake/Output Summary (Last 24 hours) at 10/8/2022 1124  Last data filed at 10/8/2022 0557  Gross per 24 hour   Intake 2049.12 ml   Output 1600 ml   Net 449.12 ml         MEDICATIONS:   insulin lispro  0-8 Units SubCUTAneous 4x Daily AC & HS    apixaban  5 mg Oral BID    cefepime  2,000 mg IntraVENous Q12H    insulin glargine  13 Units SubCUTAneous BID    atorvastatin  20 mg Oral Nightly    Arformoterol Tartrate  15 mcg Nebulization BID    budesonide  0.5 mg Nebulization BID    aspirin  81 mg Oral Daily      lactated ringers 40 mL/hr at 10/08/22 0557    norepinephrine      dextrose       glucose, dextrose bolus **OR** dextrose bolus, glucagon (rDNA), dextrose, perflutren lipid microspheres, LORazepam, acetaminophen    OBJECTIVE:  Vitals:    10/08/22 0900   BP: (!) 123/51   Pulse: 98   Resp: 21   Temp:    SpO2: 98%     FiO2 : 35 %  O2 Flow Rate (L/min): 2 L/min  O2 Device: Nasal cannula        LABS:  WBC   Date Value Ref Range Status   10/08/2022 3.1 (L) 4.5 - 11.5 E9/L Final   10/07/2022 3.7 (L) 4.5 - 11.5 E9/L Final   10/06/2022 5.5 4.5 - 11.5 E9/L Final     Hemoglobin   Date Value Ref Range Status   10/08/2022 8.5 (L) 11.5 - 15.5 g/dL Final   10/07/2022 9.1 (L) 11.5 - 15.5 g/dL Final   10/06/2022 9.3 (L) 11.5 - 15.5 g/dL Final     Hematocrit   Date Value Ref Range Status   10/08/2022 28.9 (L) 34.0 - 48.0 % Final   10/07/2022 31.5 (L) 34.0 - 48.0 % Final   10/06/2022 32.5 (L) 34.0 - 48.0 % Final     MCV   Date Value Ref Range Status   10/08/2022 91.2 80.0 - 99.9 fL Final   10/07/2022 93.2 80.0 - 99.9 fL Final   10/06/2022 93.4 80.0 - 99.9 fL Final     Platelets   Date Value Ref Range Status   10/08/2022 136 130 - 450 E9/L Final   10/07/2022 138 130 - 450 E9/L Final   10/06/2022 134 130 - 450 E9/L Final     Sodium   Date Value Ref Range Status   10/08/2022 144 132 - 146 mmol/L Final   10/07/2022 141 132 - 146 mmol/L Final   10/06/2022 142 132 - 146 mmol/L Final     Potassium   Date Value Ref Range Status   10/08/2022 4.0 3.5 - 5.0 mmol/L Final   10/07/2022 3.7 3.5 - 5.0 mmol/L Final   10/06/2022 4.0 3.5 - 5.0 mmol/L Final     Potassium reflex Magnesium   Date Value Ref Range Status   10/05/2022 4.2 3.5 - 5.0 mmol/L Final   10/03/2022 3.8 3.5 - 5.0 mmol/L Final   09/03/2022 3.5 3.5 - 5.0 mmol/L Final     Chloride   Date Value Ref Range Status   10/08/2022 106 98 - 107 mmol/L Final   10/07/2022 103 98 - 107 mmol/L Final   10/06/2022 103 98 - 107 mmol/L Final     CO2   Date Value Ref Range Status   10/08/2022 34 (H) 22 - 29 mmol/L Final   10/07/2022 34 (H) 22 - 29 mmol/L Final   10/06/2022 33 (H) 22 - 29 mmol/L Final     BUN   Date Value Ref Range Status   10/08/2022 21 6 - 23 mg/dL Final   10/07/2022 24 (H) 6 - 23 mg/dL Final   10/06/2022 25 (H) 6 - 23 mg/dL Final     Creatinine   Date Value Ref Range Status   10/08/2022 1.0 0.5 - 1.0 mg/dL Final   10/07/2022 1.0 0.5 - 1.0 mg/dL Final   10/06/2022 1.1 (H) 0.5 - 1.0 mg/dL Final     Glucose   Date Value Ref Range Status   10/08/2022 174 (H) 74 - 99 mg/dL Final   10/07/2022 149 (H) 74 - 99 mg/dL Final   10/06/2022 134 (H) 74 - 99 mg/dL Final     Calcium   Date Value Ref Range Status   10/08/2022 8.5 (L) 8.6 - 10.2 mg/dL Final   10/07/2022 8.5 (L) 8.6 - 10.2 mg/dL Final   10/06/2022 8.4 (L) 8.6 - 10.2 mg/dL Final     Total Protein   Date Value Ref Range Status   10/08/2022 4.8 (L) 6.4 - 8.3 g/dL Final   10/07/2022 5.3 (L) 6.4 - 8.3 g/dL Final   10/06/2022 5.2 (L) 6.4 - 8.3 g/dL Final     Albumin   Date Value Ref Range Status   10/08/2022 2.5 (L) 3.5 - 5.2 g/dL Final   10/07/2022 2.8 (L) 3.5 - 5.2 g/dL Final   10/06/2022 3.0 (L) 3.5 - 5.2 g/dL Final     Total Bilirubin   Date Value Ref Range Status   10/08/2022 0.4 0.0 - 1.2 mg/dL Final   10/07/2022 0.5 0.0 - 1.2 mg/dL Final   10/06/2022 0.5 0.0 - 1.2 mg/dL Final     Alkaline Phosphatase   Date Value Ref Range Status   10/08/2022 74 35 - 104 U/L Final   10/07/2022 71 35 - 104 U/L Final   10/06/2022 68 35 - 104 U/L Final     AST   Date Value Ref Range Status   10/08/2022 7 0 - 31 U/L Final   10/07/2022 8 0 - 31 U/L Final   10/06/2022 8 0 - 31 U/L Final     ALT   Date Value Ref Range Status   10/08/2022 5 0 - 32 U/L Final   10/07/2022 7 0 - 32 U/L Final   10/06/2022 5 0 - 32 U/L Final     GFR Non-   Date Value Ref Range Status   10/08/2022 54 >=60 mL/min/1.73 Final     Comment:     Chronic Kidney Disease: less than 60 ml/min/1.73 sq.m. Kidney Failure: less than 15 ml/min/1.73 sq.m. Results valid for patients 18 years and older. 10/07/2022 54 >=60 mL/min/1.73 Final     Comment:     Chronic Kidney Disease: less than 60 ml/min/1.73 sq.m. Kidney Failure: less than 15 ml/min/1.73 sq.m. Results valid for patients 18 years and older. 10/06/2022 49 >=60 mL/min/1.73 Final     Comment:     Chronic Kidney Disease: less than 60 ml/min/1.73 sq.m. Kidney Failure: less than 15 ml/min/1.73 sq.m. Results valid for patients 18 years and older. GFR    Date Value Ref Range Status   10/08/2022 >60  Final   10/07/2022 >60  Final   10/06/2022 59  Final     Magnesium   Date Value Ref Range Status   10/08/2022 1.9 1.6 - 2.6 mg/dL Final   10/07/2022 1.8 1.6 - 2.6 mg/dL Final   10/06/2022 1.8 1.6 - 2.6 mg/dL Final     Phosphorus   Date Value Ref Range Status   10/08/2022 3.0 2.5 - 4.5 mg/dL Final   10/07/2022 2.8 2.5 - 4.5 mg/dL Final   10/06/2022 2.6 2.5 - 4.5 mg/dL Final     Recent Labs     10/08/22  0445   PH 7.401   PO2 99.5   PCO2 57.2*   HCO3 34.7*   BE 8.6*   O2SAT 96.9       RADIOLOGY:  XR CHEST PORTABLE   Final Result   Redemonstration of interstitial pulmonary edema or pneumonia bilaterally   appearing to have increased in right lung base.          XR CHEST PORTABLE   Final Result   Redemonstration of an enlarged cardiac silhouette along with right basilar   pleural and parenchymal disease. CT ABDOMEN PELVIS W IV CONTRAST Additional Contrast? None   Final Result   Subtle suggestion of hepatic steatosis and cirrhosis including portal venous   hypertension with splenomegaly. Small volume abdominal ascites and mild anasarca. Bilateral lower lobe infiltrates and pleural effusions, right more than left. Pneumonia is suspected. Stable benign left adrenal adenoma. No imaging follow-up is required. Chronic left renal scarring probably due to remote infection. Small collection of gas within the endometrial cavity presumably   iatrogenically introduced. No definite uterine pathology is appreciated. No   focal uterine fluid collections. The uterus could be further evaluated   sonographically if clinically indicated. Stool-filled colon particularly on the right suggesting constipation. XR CHEST PORTABLE   Final Result   Cardiomegaly and borderline pulmonary vascular congestion. Suspected   developing right basilar atelectasis and possible pleural effusion. XR CHEST PORTABLE   Final Result   Enlarged cardiac silhouette. Parahilar peribronchial thickening and mild   pulmonary interstitial changes. Right basilar atelectasis and elevated right   hemidiaphragm. PROBLEM LIST:  Principal Problem:    Sepsis ([address]  Resolved Problems:    * No resolved hospital problems. *      ATTESTATION:  ICU Staff Physician note of personal involvement in Care  As the attending physician, I certify that I personally reviewed the patients history and personnally examined the patient to confirm the physical findings described above,  And that I reviewed the relevant imaging studies and available reports. I also discussed the differential diagnosis and all of the proposed management plans with the patient and individuals accompanying the patient to this visit.   They had the opportunity to ask questions about the proposed management plans and to have those questions answered. This patient has a high probability of sudden, clinically significant deterioration, which requires the highest level of physician preparedness to intervene urgently. I managed/supervised life or organ supporting interventions that required frequent physician assessment. I devoted my full attention to the direct care of this patient for the amount of time indicated below. Time I spent with the family or surrogate(s) is included only if the patient was incapable of providing the necessary information or participating in medical decisions - Time devoted to teaching and to any procedures I billed separately is not included.      CRITICAL CARE TIME:  30 minutes    Raul Francois MD  Pulmonary and Critical Care Medicine

## 2022-10-08 NOTE — PLAN OF CARE
Problem: Pain  Goal: Verbalizes/displays adequate comfort level or baseline comfort level  Outcome: Progressing  Flowsheets (Taken 10/7/2022 1600 by Alek Ortiz RN)  Verbalizes/displays adequate comfort level or baseline comfort level: Encourage patient to monitor pain and request assistance     Problem: Genitourinary - Adult  Goal: Urinary catheter remains patent  Outcome: Progressing     Problem: Musculoskeletal - Adult  Goal: Return ADL status to a safe level of function  Outcome: Progressing     Problem: Musculoskeletal - Adult  Goal: Maintain proper alignment of affected body part  Outcome: Progressing     Problem: Musculoskeletal - Adult  Goal: Return mobility to safest level of function  Outcome: Progressing     Problem: Cardiovascular - Adult  Goal: Maintains optimal cardiac output and hemodynamic stability  Outcome: Progressing     Problem: Infection - Adult  Goal: Absence of infection during hospitalization  Outcome: Progressing     Problem: Infection - Adult  Goal: Absence of infection at discharge  Outcome: Progressing     Problem: Skin/Tissue Integrity - Adult  Goal: Skin integrity remains intact  Outcome: Progressing     Problem: Nutrition Deficit:  Goal: Optimize nutritional status  Outcome: Progressing     Problem: Chronic Conditions and Co-morbidities  Goal: Patient's chronic conditions and co-morbidity symptoms are monitored and maintained or improved  Outcome: Progressing     Problem: ABCDS Injury Assessment  Goal: Absence of physical injury  Outcome: Progressing     Problem: Skin/Tissue Integrity  Goal: Absence of new skin breakdown  Description: 1. Monitor for areas of redness and/or skin breakdown  2. Assess vascular access sites hourly  3. Every 4-6 hours minimum:  Change oxygen saturation probe site  4. Every 4-6 hours:  If on nasal continuous positive airway pressure, respiratory therapy assess nares and determine need for appliance change or resting period.   Outcome: Progressing     Problem: Safety - Adult  Goal: Free from fall injury  Outcome: Progressing     Problem: Discharge Planning  Goal: Discharge to home or other facility with appropriate resources  Outcome: Progressing

## 2022-10-08 NOTE — PROGRESS NOTES
Progress Note  Date:10/8/2022       Room:[place] [address]  Patient Name:Janneth Vasquez Parents     YOB: 1950     Age:71 y.o. Subjective    Subjective:  Symptoms:  Stable. She reports shortness of breath, malaise and weakness. Diet:  Adequate intake. No nausea or vomiting. Activity level: Impaired due to weakness. Pain:  She reports no pain. Review of Systems   Constitutional:  Negative for fever. Respiratory:  Positive for shortness of breath. Gastrointestinal:  Negative for nausea and vomiting. Neurological:  Positive for weakness. All other systems reviewed and are negative. Objective         Vitals Last 24 Hours:  TEMPERATURE:  Temp  Av.2 °F (37.3 °C)  Min: 98.2 °F (36.8 °C)  Max: 101.1 °F (38.4 °C)  RESPIRATIONS RANGE: Resp  Av.8  Min: 12  Max: 38  PULSE OXIMETRY RANGE: SpO2  Av.8 %  Min: 90 %  Max: 100 %  PULSE RANGE: Pulse  Av.9  Min: 77  Max: 123  BLOOD PRESSURE RANGE: Systolic (63CFF), PEY:978 , Min:88 , RCL:848   ; Diastolic (97FWK), LNC:24, Min:41, Max:123    I/O (24Hr): Intake/Output Summary (Last 24 hours) at 10/8/2022 0943  Last data filed at 10/8/2022 0557  Gross per 24 hour   Intake 2049.12 ml   Output 1600 ml   Net 449.12 ml     Objective:  General Appearance:  Ill-appearing. Vital signs: (most recent): Blood pressure (!) 125/59, pulse 79, temperature 96.8 °F (36 °C), temperature source Axillary, resp. rate 18, height 5' (1.524 m), weight 200 lb (90.7 kg), SpO2 98 %, not currently breastfeeding. Vital signs are normal.  No fever. Output: Producing urine and producing stool. HEENT: Normal HEENT exam.    Lungs:  Normal effort and normal respiratory rate. Heart: Normal rate. Regular rhythm. Abdomen: Abdomen is soft. Bowel sounds are normal.   There is no abdominal tenderness. Extremities: Decreased range of motion. Neurological: Patient is alert. Skin:  Warm and dry.     Labs/Imaging/Diagnostics    Labs:  CBC:  Recent Labs     10/06/22  0500 10/07/22  0444 10/08/22  0453   WBC 5.5 3.7* 3.1*   RBC 3.48* 3.38* 3.17*   HGB 9.3* 9.1* 8.5*   HCT 32.5* 31.5* 28.9*   MCV 93.4 93.2 91.2   RDW 15.4* 15.5* 15.0    138 136     CHEMISTRIES:  Recent Labs     10/06/22  0500 10/07/22  0444 10/08/22  0453    141 144   K 4.0 3.7 4.0    103 106   CO2 33* 34* 34*   BUN 25* 24* 21   CREATININE 1.1* 1.0 1.0   GLUCOSE 134* 149* 174*   PHOS 2.6 2.8 3.0   MG 1.8 1.8 1.9     PT/INR:No results for input(s): PROTIME, INR in the last 72 hours. APTT:No results for input(s): APTT in the last 72 hours. LIVER PROFILE:  Recent Labs     10/06/22  0500 10/07/22  0444 10/08/22  0453   AST 8 8 7   ALT 5 7 5   BILITOT 0.5 0.5 0.4   ALKPHOS 68 71 74       Imaging Last 24 Hours:  CT ABDOMEN PELVIS W IV CONTRAST Additional Contrast? None    Result Date: 10/6/2022  EXAMINATION: CT OF THE ABDOMEN AND PELVIS WITH CONTRAST 10/6/2022 11:33 am TECHNIQUE: CT of the abdomen and pelvis was performed with the administration of intravenous contrast. Multiplanar reformatted images are provided for review. Automated exposure control, iterative reconstruction, and/or weight based adjustment of the mA/kV was utilized to reduce the radiation dose to as low as reasonably achievable. COMPARISON: 07/05/2022 abdomen/pelvis CT. HISTORY: ORDERING SYSTEM PROVIDED HISTORY: ? intraabdominal infection, elevated temps TECHNOLOGIST PROVIDED HISTORY: Reason for exam:->? intraabdominal infection, elevated temps Additional Contrast?->None FINDINGS: Lower Chest: There are small bilateral pleural effusions, right greater than left. There is consolidation at the posterior lung bases, right greater than left. Cardiac size is enlarged. There is a small pericardial effusion. Organs: There is subtle nodular contour to the hepatic periphery. The liver is slightly hypodense. The spleen is enlarged.   The right adrenal gland is normal.  There is a 9 mm left adrenal lesion measuring 50 Hounsfield units. This measured -1 Hounsfield unit on previous noncontrast CT. There are surgical clips in the gallbladder fossa. There is left cortical renal thinning superior pole, unchanged. The kidneys are otherwise unremarkable. No hydronephrosis. GI/Bowel: The stomach is not distended but is grossly unremarkable. There are some mildly distended fluid-filled loops of small bowel in the left abdomen but no bowel wall thickening or obstruction. The colon is stool-filled particularly on the right but there is no evidence of colonic obstruction or mass. No evidence of appendicitis. Pelvis: There is a Diaz catheter within the urinary bladder which is largely decompressed and not well evaluated. There is a small gas collection within the endometrial cavity. This may have been iatrogenically introduced. The uterus is otherwise unremarkable. No adnexal masses. Peritoneum/Retroperitoneum: There is atherosclerotic calcification of the abdominal aorta, splenic and iliac arteries but no abdominal aortic aneurysm. No abdominal or pelvic lymphadenopathy. There is a small volume of abdominal ascites most notably in the right upper quadrant Bones/Soft Tissues: There is mild lower thoracic and lumbar spondylosis and dextroconvex curvature. There is multilevel mild and moderate lower thoracic and lumbar disc disease. There is induration of the subcutaneous and to a lesser degree the mesenteric fat. Subtle suggestion of hepatic steatosis and cirrhosis including portal venous hypertension with splenomegaly. Small volume abdominal ascites and mild anasarca. Bilateral lower lobe infiltrates and pleural effusions, right more than left. Pneumonia is suspected. Stable benign left adrenal adenoma. No imaging follow-up is required. Chronic left renal scarring probably due to remote infection. Small collection of gas within the endometrial cavity presumably iatrogenically introduced.   No definite uterine pathology is appreciated. No focal uterine fluid collections. The uterus could be further evaluated sonographically if clinically indicated. Stool-filled colon particularly on the right suggesting constipation. XR CHEST PORTABLE    Result Date: 10/8/2022  EXAMINATION: ONE XRAY VIEW OF THE CHEST 10/8/2022 7:04 am COMPARISON: October 7, 2022 HISTORY: ORDERING SYSTEM PROVIDED HISTORY: SOB TECHNOLOGIST PROVIDED HISTORY: Reason for exam:->SOB FINDINGS: Interstitial and hazy opacities bilaterally notable in right lung base silhouetting right hemidiaphragm. Mild cardiomegaly. No pneumothorax. Redemonstration of interstitial pulmonary edema or pneumonia bilaterally appearing to have increased in right lung base. XR CHEST PORTABLE    Result Date: 10/7/2022  EXAMINATION: ONE XRAY VIEW OF THE CHEST 10/7/2022 6:03 am COMPARISON: 10/06/2022. HISTORY: ORDERING SYSTEM PROVIDED HISTORY: sob TECHNOLOGIST PROVIDED HISTORY: Reason for exam:->sob FINDINGS: The cardiac silhouette is enlarged. There is consolidation and/or collapse in the right lung base. There is also a right pleural effusion. These findings appear relatively stable. Redemonstration of an enlarged cardiac silhouette along with right basilar pleural and parenchymal disease. Assessment//Plan           Hospital Problems             Last Modified POA    * (Principal) Sepsis ([facility] [address] 10/5/2022 Yes     Assessment:    Condition: In stable condition. Improving. (Continue current management  ). Plan:   Consults: pulmonology. Regular diet. (Continue current management  Will follow).      Electronically signed by Dat Justice DO on 10/8/22 at 9:43 AM EDT

## 2022-10-08 NOTE — PROGRESS NOTES
Patient called daughter Jose Reddy to notify of transfer and room assignment 313-2.     [facility]

## 2022-10-08 NOTE — PROGRESS NOTES
Patient's HR elevated in 120-140s. Dr. Jeremiah Lanza notified. EKG ordered and results read to physician. Cardizem bolus ordered. Physician notified that patient would need to be transferred as we do not give Cardizem on our unit. Transfer order placed. Bed Coordinator notified. Patient's daughter notified of transfer order and new bed assignment.

## 2022-10-08 NOTE — PROGRESS NOTES
1816  Patient transferred to 5th floor for Afib RVR  Assessment reveled HR of 140-150  Given a 20mg bolus of Cardizem at bedside  -114  /72  O2 95% 2L

## 2022-10-09 ENCOUNTER — APPOINTMENT (OUTPATIENT)
Dept: CT IMAGING | Age: 72
DRG: 871 | End: 2022-10-09
Payer: MEDICARE

## 2022-10-09 LAB
EKG ATRIAL RATE: 150 BPM
EKG Q-T INTERVAL: 272 MS
EKG QRS DURATION: 76 MS
EKG QTC CALCULATION (BAZETT): 415 MS
EKG R AXIS: 76 DEGREES
EKG T AXIS: -127 DEGREES
EKG VENTRICULAR RATE: 140 BPM
METER GLUCOSE: 194 MG/DL (ref 74–99)
METER GLUCOSE: 229 MG/DL (ref 74–99)
METER GLUCOSE: 249 MG/DL (ref 74–99)
METER GLUCOSE: 281 MG/DL (ref 74–99)

## 2022-10-09 PROCEDURE — 6360000002 HC RX W HCPCS: Performed by: INTERNAL MEDICINE

## 2022-10-09 PROCEDURE — 94660 CPAP INITIATION&MGMT: CPT

## 2022-10-09 PROCEDURE — 6370000000 HC RX 637 (ALT 250 FOR IP): Performed by: FAMILY MEDICINE

## 2022-10-09 PROCEDURE — 82962 GLUCOSE BLOOD TEST: CPT

## 2022-10-09 PROCEDURE — 6370000000 HC RX 637 (ALT 250 FOR IP): Performed by: INTERNAL MEDICINE

## 2022-10-09 PROCEDURE — 2060000000 HC ICU INTERMEDIATE R&B

## 2022-10-09 PROCEDURE — 2700000000 HC OXYGEN THERAPY PER DAY

## 2022-10-09 PROCEDURE — 99223 1ST HOSP IP/OBS HIGH 75: CPT | Performed by: INTERNAL MEDICINE

## 2022-10-09 PROCEDURE — 94640 AIRWAY INHALATION TREATMENT: CPT

## 2022-10-09 PROCEDURE — 2580000003 HC RX 258: Performed by: INTERNAL MEDICINE

## 2022-10-09 PROCEDURE — 70450 CT HEAD/BRAIN W/O DYE: CPT

## 2022-10-09 RX ORDER — FUROSEMIDE 40 MG/1
40 TABLET ORAL DAILY
Status: DISCONTINUED | OUTPATIENT
Start: 2022-10-10 | End: 2022-10-10 | Stop reason: HOSPADM

## 2022-10-09 RX ORDER — FUROSEMIDE 10 MG/ML
40 INJECTION INTRAMUSCULAR; INTRAVENOUS ONCE
Status: COMPLETED | OUTPATIENT
Start: 2022-10-09 | End: 2022-10-09

## 2022-10-09 RX ADMIN — APIXABAN 5 MG: 5 TABLET, FILM COATED ORAL at 20:24

## 2022-10-09 RX ADMIN — LORAZEPAM 0.5 MG: 0.5 TABLET ORAL at 23:04

## 2022-10-09 RX ADMIN — FUROSEMIDE 40 MG: 10 INJECTION, SOLUTION INTRAMUSCULAR; INTRAVENOUS at 10:00

## 2022-10-09 RX ADMIN — LORAZEPAM 0.5 MG: 0.5 TABLET ORAL at 00:09

## 2022-10-09 RX ADMIN — ARFORMOTEROL TARTRATE 15 MCG: 15 SOLUTION RESPIRATORY (INHALATION) at 19:12

## 2022-10-09 RX ADMIN — CARBIDOPA AND LEVODOPA 2 TABLET: 25; 100 TABLET, EXTENDED RELEASE ORAL at 15:12

## 2022-10-09 RX ADMIN — INSULIN LISPRO 4 UNITS: 100 INJECTION, SOLUTION INTRAVENOUS; SUBCUTANEOUS at 18:29

## 2022-10-09 RX ADMIN — APIXABAN 5 MG: 5 TABLET, FILM COATED ORAL at 08:32

## 2022-10-09 RX ADMIN — BUDESONIDE 500 MCG: 0.5 SUSPENSION RESPIRATORY (INHALATION) at 19:12

## 2022-10-09 RX ADMIN — CARBIDOPA AND LEVODOPA 2 TABLET: 25; 100 TABLET, EXTENDED RELEASE ORAL at 20:23

## 2022-10-09 RX ADMIN — INSULIN GLARGINE 13 UNITS: 100 INJECTION, SOLUTION SUBCUTANEOUS at 20:25

## 2022-10-09 RX ADMIN — CEFEPIME 2000 MG: 2 INJECTION, POWDER, FOR SOLUTION INTRAVENOUS at 08:28

## 2022-10-09 RX ADMIN — METOPROLOL SUCCINATE 100 MG: 50 TABLET, EXTENDED RELEASE ORAL at 20:24

## 2022-10-09 RX ADMIN — METOPROLOL SUCCINATE 100 MG: 50 TABLET, EXTENDED RELEASE ORAL at 08:32

## 2022-10-09 RX ADMIN — CEFEPIME 2000 MG: 2 INJECTION, POWDER, FOR SOLUTION INTRAVENOUS at 20:39

## 2022-10-09 RX ADMIN — CARBIDOPA AND LEVODOPA 2 TABLET: 25; 100 TABLET, EXTENDED RELEASE ORAL at 10:00

## 2022-10-09 RX ADMIN — ASPIRIN 81 MG CHEWABLE TABLET 81 MG: 81 TABLET CHEWABLE at 08:32

## 2022-10-09 RX ADMIN — INSULIN GLARGINE 13 UNITS: 100 INJECTION, SOLUTION SUBCUTANEOUS at 08:40

## 2022-10-09 RX ADMIN — LORAZEPAM 0.5 MG: 0.5 TABLET ORAL at 13:03

## 2022-10-09 RX ADMIN — INSULIN LISPRO 2 UNITS: 100 INJECTION, SOLUTION INTRAVENOUS; SUBCUTANEOUS at 12:26

## 2022-10-09 RX ADMIN — INSULIN LISPRO 2 UNITS: 100 INJECTION, SOLUTION INTRAVENOUS; SUBCUTANEOUS at 20:24

## 2022-10-09 RX ADMIN — ATORVASTATIN CALCIUM 20 MG: 20 TABLET, FILM COATED ORAL at 20:23

## 2022-10-09 RX ADMIN — BUDESONIDE 500 MCG: 0.5 SUSPENSION RESPIRATORY (INHALATION) at 06:35

## 2022-10-09 RX ADMIN — ARFORMOTEROL TARTRATE 15 MCG: 15 SOLUTION RESPIRATORY (INHALATION) at 06:35

## 2022-10-09 ASSESSMENT — PAIN SCALES - GENERAL
PAINLEVEL_OUTOF10: 0
PAINLEVEL_OUTOF10: 0

## 2022-10-09 ASSESSMENT — ENCOUNTER SYMPTOMS
VOMITING: 0
NAUSEA: 0
SHORTNESS OF BREATH: 1

## 2022-10-09 NOTE — PLAN OF CARE
Problem: Discharge Planning  Goal: Discharge to home or other facility with appropriate resources  10/9/2022 1317 by Freeda Brittle, RN  Outcome: Progressing  Flowsheets (Taken 10/9/2022 0806)  Discharge to home or other facility with appropriate resources: Identify barriers to discharge with patient and caregiver  10/8/2022 2329 by Ayan Chakraborty RN  Outcome: Progressing  Flowsheets (Taken 10/8/2022 1840 by Ap Knight RN)  Discharge to home or other facility with appropriate resources: Identify barriers to discharge with patient and caregiver     Problem: Safety - Adult  Goal: Free from fall injury  10/9/2022 1317 by Freeda Brittle, RN  Outcome: Progressing  10/8/2022 2329 by Ayan Chakraborty RN  Outcome: Progressing  Flowsheets (Taken 10/8/2022 1930)  Free From Fall Injury: Instruct family/caregiver on patient safety     Problem: Skin/Tissue Integrity  Goal: Absence of new skin breakdown  Description: 1. Monitor for areas of redness and/or skin breakdown  2. Assess vascular access sites hourly  3. Every 4-6 hours minimum:  Change oxygen saturation probe site  4. Every 4-6 hours:  If on nasal continuous positive airway pressure, respiratory therapy assess nares and determine need for appliance change or resting period.   10/9/2022 1317 by Freeda Brittle, RN  Outcome: Progressing  10/8/2022 2329 by Ayan Chakraborty RN  Outcome: Progressing     Problem: ABCDS Injury Assessment  Goal: Absence of physical injury  10/9/2022 1317 by Freeda Brittle, RN  Outcome: Progressing  10/8/2022 2329 by Ayan Chakraborty RN  Outcome: Progressing  Flowsheets (Taken 10/8/2022 1930)  Absence of Physical Injury: Implement safety measures based on patient assessment

## 2022-10-09 NOTE — CONSULTS
[id number] CONSULT    Name: Sameera Edgar    Age: 66 y.o. Date of Admission: 10/5/2022 10:40 AM    Date of Service: 10/9/2022    Reason for Consultation: AF RVR    Referring Physician: Marvin Valdez DO    Primary Cardiologist: Known to [facility] through inpatient consultation only, initially seen by Dr. Monica Michel 1/2022    History of Present Illness:   Sameera Edgar is a 66 y.o. female who presented on 10/5/2022 for further evaluation of shortness of breath, hypoxia and was admitted to the ICU for sepsis pneumonia and respiratory failure with hypotension thus beta-blocker was not continued on admission. She improved and was moved out of ICU. However yesterday developed A. fib RVR was moved to the fifth floor for diltiazem infusion first dose of beta-blocker for this admission was given yesterday evening. She transiently required diltiazem infusion but it was rapidly weaned off, heart rates are now normal and remains in A. fib. New onset atrial fibrillation initially diagnosed 4/2022 with multiple subsequent cardioversions with recurrence of A. fib. She is on anticoagulation with apixaban. Denies chest pain. Still some shortness of breath. Unaware of being out of rhythm, denies palpitations. Review of Systems:   Complete review of systems negative except as described above.     Past Medical History:  Past Medical History:   Diagnosis Date    A-fib ([facility] [address]     Acute on chronic congestive heart failure (HCC)     Anxiety     Asthma     CAD (coronary artery disease) 01/21/2016    Cancer ([facility] [address]  breast ca 2006    right lumpectomy    Chronic kidney disease     nephrolithiasis    Depression     Diabetes mellitus ([facility] [address]     H/O mammogram     Hx MRSA infection     toe infection january 2012    Hyperlipidemia     Hypertension     Lateral epicondylitis     Morbid obesity (HCC)     SERENA on CPAP     Oxygen dependent     Parkinson's disease ([facility] [address]     Tubal ligation status        Past Surgical History:  Past Surgical History:   Procedure Laterality Date    BREAST LUMPECTOMY      BREAST REDUCTION SURGERY      CARDIAC CATHETERIZATION  4/28/2014    Dr. Rik Odom  01/11/2022    Dr. Vitaliy Choudhary  7/29/15    CT GUIDED CHEST TUBE  7/8/2022    CT GUIDED CHEST TUBE 7/8/2022 Karel Quinteros MD [facility]    ENDOSCOPY, COLON, DIAGNOSTIC  7/19/15    GALLBLADDER SURGERY      LUMBAR LAMINECTOMY      TOE AMPUTATION      TONSILLECTOMY      UPPER GASTROINTESTINAL ENDOSCOPY      UPPER GASTROINTESTINAL ENDOSCOPY N/A 7/19/2022    EGD ESOPHAGOGASTRODUODENOSCOPY performed by Piotr Escobar MD at [facility] ENDOSCOPY       Family History:  Family History   Problem Relation Age of Onset    Cancer Mother         Lung Ca    Cancer Father         lung Ca    Diabetes Sister     Heart Disease Sister     Seizures Son     Other Brother         sepsis       Social History:  Social History     Tobacco Use    Smoking status: Never    Smokeless tobacco: Never   Vaping Use    Vaping Use: Never used   Substance Use Topics    Alcohol use: No    Drug use: No       Allergies: Allergies   Allergen Reactions    Ciprofloxacin Nausea And Vomiting    Codeine Itching     Creepy crawly \" feelings       Home Medications:  Prior to Admission medications    Medication Sig Start Date End Date Taking? Authorizing Provider   benzonatate (TESSALON) 100 MG capsule Take 100 mg by mouth 3 times daily as needed for Cough   Yes Historical Provider, MD   loperamide (IMODIUM) 2 MG capsule Take 2 mg by mouth 4 times daily as needed for Diarrhea   Yes Historical Provider, MD   bumetanide (BUMEX) 1 MG tablet Take 0.5 mg by mouth 2 times daily   Yes Historical Provider, MD   zolpidem (AMBIEN) 5 MG tablet Take 5 mg by mouth nightly.    Yes Historical Provider, MD   OXYGEN Inhale 2 L into the lungs continuous   Yes Historical Provider, MD   metoprolol succinate (TOPROL XL) 25 MG extended release tablet Take 3 tablets by mouth in the morning and at bedtime 9/8/22 10/8/22  Jailyn Trujillo MD   digoxin [facility]) 125 MCG tablet Take 1 tablet by mouth daily 9/9/22   Jailyn Trujillo MD   docusate sodium (COLACE, DULCOLAX) 100 MG CAPS Take 100 mg by mouth 2 times daily as needed for Constipation 9/8/22   Jailyn Trujillo MD   insulin glargine (LANTUS) 100 UNIT/ML injection vial Inject 13 Units into the skin 2 times daily 9/8/22   Jailyn Trujillo MD   insulin lispro (HUMALOG) 100 UNIT/ML SOLN injection vial Inject 3 Units into the skin 3 times daily (with meals) 9/8/22   Jailyn Trujillo MD   atorvastatin (LIPITOR) 20 MG tablet Take 20 mg by mouth nightly    Historical Provider, MD   benzoin compound solution Apply 1 Applicatorful topically nightly Apply topically to right toe    Historical Provider, MD   ipratropium-albuterol (DUONEB) 0.5-2.5 (3) MG/3ML SOLN nebulizer solution Inhale 1 vial into the lungs 4 times daily    Historical Provider, MD   LORazepam (ATIVAN) 1 MG tablet Take 1 mg by mouth every 6 hours as needed for Anxiety.     Historical Provider, MD   miconazole (MICOTIN) 2 % powder Apply 1 each topically 2 times daily Apply topically under breasts twice daily    Historical Provider, MD   insulin aspart (NOVOLOG) 100 UNIT/ML injection vial Inject 0-10 Units into the skin 3 times daily (before meals) Blood Glucose 140 - 199 = 1 Unit  Blood Glucose 200 - 249 = 2 Units  Blood Glucose 250 - 299 = 4 Units  Blood Glucose 300 - 349 = 6 Units  Blood Glucose 350 - 399 = 8 Units  Blood Glucose 400+  = 10 Units    Historical Provider, MD   pantoprazole (PROTONIX) 40 MG tablet Take 40 mg by mouth every morning    Historical Provider, MD   promethazine (PHENERGAN) 25 MG tablet Take 25 mg by mouth every 6 hours as needed for Nausea    Historical Provider, MD   mirtazapine (REMERON) 15 MG tablet Take 15 mg by mouth nightly    Historical Provider, MD   budesonide-formoterol (SYMBICORT) 160-4.5 MCG/ACT AERO Inhale 2 puffs into the lungs 2 times daily    Historical Provider, MD   acetaminophen (TYLENOL) 325 MG tablet Take 650 mg by mouth every 4 hours as needed for Pain or Fever    Historical Provider, MD   metoprolol tartrate (LOPRESSOR) 25 MG tablet Take 0.5 tablets by mouth in the morning and 0.5 tablets before bedtime. 7/27/22 9/8/22  Real Alcantara MD   apixaban (ELIQUIS) 5 MG TABS tablet Take 1 tablet by mouth in the morning and 1 tablet before bedtime. 7/23/22   Real Alcantara MD   carbidopa-levodopa (SINEMET CR)  MG per extended release tablet Take 2 tablets by mouth in the morning and 2 tablets at noon and 2 tablets in the evening. 7/23/22   Real Alcantara MD   rOPINIRole (REQUIP) 1 MG tablet Take 1 tablet by mouth in the morning and 1 tablet at noon and 1 tablet before bedtime. 7/23/22   Real Alcantara MD   amiodarone (CORDARONE) 200 MG tablet Take 1 tablet by mouth in the morning. 7/24/22 9/8/22  Real Alcantara MD   budesonide (PULMICORT) 0.5 MG/2ML nebulizer suspension Take 2 mLs by nebulization in the morning and 2 mLs in the evening. 7/23/22 9/8/22  Real Alcantara MD   insulin glargine-yfgn [facility]) 100 UNIT/ML injection vial Inject 5 Units into the skin nightly 7/23/22 9/8/22  Real Alcantara MD   QUEtiapine (SEROQUEL) 25 MG tablet Take 1 tablet by mouth in the morning and 1 tablet before bedtime. 7/23/22 9/8/22  Real Alcantara MD   sucralfate (CARAFATE) 1 GM tablet Take 1 tablet by mouth in the morning and 1 tablet at noon and 1 tablet in the evening and 1 tablet before bedtime.  7/20/22   Leyla Gaines DO   divalproex (DEPAKOTE) 250 MG DR tablet Take 250 mg by mouth 2 times daily  7/23/22  Historical Provider, MD   ferrous sulfate (IRON 325) 325 (65 Fe) MG tablet Take 325 mg by mouth daily (with breakfast)  Patient not taking: Reported on 7/7/2022 7/23/22  Historical Provider, MD   aspirin EC 81 MG EC tablet Take 1 tablet by mouth daily 5/17/22   Clearance MD Saji montelukast (SINGULAIR) 10 MG tablet Take 10 mg by mouth nightly    Historical Provider, MD       Current Medications:    Current Facility-Administered Medications:     carbidopa-levodopa (SINEMET CR)  MG per extended release tablet 2 tablet, 2 tablet, Oral, TID, Nidia Jonahingale, DO, 2 tablet at 10/08/22 2141    metoprolol succinate (TOPROL XL) extended release tablet 100 mg, 100 mg, Oral, BID, Mateus Ahuja, , 100 mg at 10/08/22 2035    dilTIAZem 125 mg in dextrose 5 % 125 mL infusion, 2.5-15 mg/hr, IntraVENous, Continuous, Nidia Dobson DO, Stopped at 10/08/22 2301    lactated ringers infusion, , IntraVENous, Continuous, Lynwood Primrose, MD, Last Rate: 40 mL/hr at 10/08/22 1421, Rate Verify at 10/08/22 1421    glucose chewable tablet 16 g, 4 tablet, Oral, PRN, Nohemy Ahuja DO    dextrose bolus 10% 125 mL, 125 mL, IntraVENous, PRN **OR** dextrose bolus 10% 250 mL, 250 mL, IntraVENous, PRN, Mateus Ahuja,     glucagon (rDNA) injection 1 mg, 1 mg, SubCUTAneous, PRN, Mateus Ahuja,     dextrose 10 % infusion, , IntraVENous, Continuous PRN, Mateus Ahuja,     insulin lispro (HUMALOG) injection vial 0-8 Units, 0-8 Units, SubCUTAneous, 4x Daily AC & HS, Lynwood Primrose, MD, 4 Units at 10/08/22 2203    apixaban (ELIQUIS) tablet 5 mg, 5 mg, Oral, BID, Lynwood Primrose, MD, 5 mg at 10/08/22 2035    perflutren lipid microspheres (DEFINITY) injection 1.65 mg, 1.5 mL, IntraVENous, ONCE PRN, Rylie Taylor MD    cefepime (MAXIPIME) 2,000 mg in sodium chloride 0.9 % 50 mL IVPB ([id number]), 2,000 mg, IntraVENous, Q12H, Rylie Taylor MD, Stopped at 10/09/22 0006    insulin glargine (LANTUS) injection vial 13 Units, 13 Units, SubCUTAneous, BID, Rylie Taylor MD, 13 Units at 10/08/22 2203    atorvastatin (LIPITOR) tablet 20 mg, 20 mg, Oral, Nightly, Rylie Taylor MD, 20 mg at 10/08/22 2049    LORazepam (ATIVAN) tablet 0.5 mg, 0.5 mg, Oral, Q12H PRN, Olga Longoira MD Tomer, 0.5 mg at 10/09/22 0009    Arformoterol Tartrate (BROVANA) nebulizer solution 15 mcg, 15 mcg, Nebulization, BID, Juany Buchanan MD, 15 mcg at 10/09/22 0635    budesonide (PULMICORT) nebulizer suspension 500 mcg, 0.5 mg, Nebulization, BID, Juany Buchanan MD, 500 mcg at 10/09/22 0304    aspirin chewable tablet 81 mg, 81 mg, Oral, Daily, Juany Buchanan MD, 81 mg at 10/08/22 3699    acetaminophen (TYLENOL) tablet 650 mg, 650 mg, Oral, Q6H PRN, Fracisco Rosa APRN - CNP, 650 mg at 10/07/22 1901    Physical Exam:  BP (!) 106/58   Pulse 77   Temp 97.8 °F (36.6 °C) (Oral)   Resp 19   Ht 5' (1.524 m)   Wt 200 lb (90.7 kg)   SpO2 98%   BMI 39.06 kg/m²   Wt Readings from Last 3 Encounters:   10/07/22 200 lb (90.7 kg)   10/03/22 200 lb (90.7 kg)   09/08/22 201 lb (91.2 kg)     Appearance: Chronically ill-appearing female, awake, alert, on nasal cannula  Skin: Intact, no rash  Head: Normocephalic, atraumatic  Eyes: EOMI, no conjunctival erythema  ENMT: No pharyngeal erythema, MMM, no rhinorrhea  Neck: Supple, no elevated JVP, no carotid bruits  Lungs: Clear to auscultation bilaterally. No wheezes, rales, or rhonchi. Cardiac: PMI nondisplaced, irregular rhythm with a normal rate, normal S1 & S2, no murmurs  Abdomen: Soft, nontender, +bowel sounds  Extremities: Moves all extremities x 4, no lower extremity edema  Neurologic: No focal motor deficits apparent, normal mood and affect  Peripheral Pulses: Intact posterior tibial pulses bilaterally    Intake/Output:    Intake/Output Summary (Last 24 hours) at 10/9/2022 0739  Last data filed at 10/8/2022 2330  Gross per 24 hour   Intake 485.81 ml   Output 725 ml   Net -239.19 ml     No intake/output data recorded.     Laboratory Tests:  Recent Labs     10/07/22  0444 10/08/22  0453    144   K 3.7 4.0    106   CO2 34* 34*   BUN 24* 21   CREATININE 1.0 1.0   GLUCOSE 149* 174*   CALCIUM 8.5* 8.5*     Lab Results   Component Value Date/Time    MG 1.9 10/08/2022 04:53 AM     Recent Labs     10/07/22  0444 10/08/22  0453   ALKPHOS 71 74   ALT 7 5   AST 8 7   PROT 5.3* 4.8*   BILITOT 0.5 0.4   LABALBU 2.8* 2.5*     Recent Labs     10/07/22  0444 10/08/22  0453   WBC 3.7* 3.1*   RBC 3.38* 3.17*   HGB 9.1* 8.5*   HCT 31.5* 28.9*   MCV 93.2 91.2   MCH 26.9 26.8   MCHC 28.9* 29.4*   RDW 15.5* 15.0    136   MPV 11.1 10.9     Lab Results   Component Value Date    CKTOTAL 32 04/25/2014    CKMB 2.1 04/25/2014    TROPONINI <0.01 10/26/2020    TROPONINI <0.01 08/24/2019    TROPONINI <0.01 05/29/2019     Lab Results   Component Value Date    INR 2.9 08/25/2022    INR 1.9 07/21/2022    INR 1.7 07/20/2022    PROTIME 33.4 (H) 08/25/2022    PROTIME 20.9 (H) 07/21/2022    PROTIME 18.4 (H) 07/20/2022     Lab Results   Component Value Date    TSH 8.350 (H) 09/27/2022     Lab Results   Component Value Date    LABA1C 6.1 (H) 08/24/2022     No results found for: EAG  Lab Results   Component Value Date    CHOL 95 05/12/2022    CHOL 97 04/15/2022    CHOL 146 03/18/2022     Lab Results   Component Value Date    TRIG 93 05/12/2022    TRIG 171 (H) 04/15/2022    TRIG 134 03/18/2022     Lab Results   Component Value Date    HDL 38 05/12/2022    HDL 46 04/15/2022    HDL 50 03/18/2022     Lab Results   Component Value Date    LDLCALC 38 05/12/2022    LDLCALC 17 04/15/2022    LDLCALC 69 03/18/2022     Lab Results   Component Value Date    LABVLDL 19 05/12/2022    LABVLDL 34 04/15/2022    LABVLDL 27 03/18/2022    VLDL 84 09/19/2017     Lab Results   Component Value Date    CHOLHDLRATIO 3.3 03/13/2019    CHOLHDLRATIO 3.8 12/11/2018    CHOLHDLRATIO 3.0 09/04/2018     No results for input(s): PROBNP in the last 72 hours. Cardiac Tests:  EKG:   10/8/2022: Atrial fibrillation  bpm.  Normal axis and intervals.   Nonspecific ST and T wave changes    Telemetry: Atrial fibrillation 130s to 150s -> now 80s    Chest X-ray:   10/8/22    Impression   Redemonstration of interstitial pulmonary edema or pneumonia bilaterally   appearing to have increased in right lung base. Echocardiogram:   TTE 7/7/22 Telly   Summary   Technically difficult study - limited visualization. Micro-bubble contrast injected to enhance left ventricular visualization. Normal left ventricular size and systolic function. Ejection fraction is visually estimated at 70-75%. Indeterminate diastolic function. No regional wall motion abnormalities seen. Mild left ventricular concentric hypertrophy noted. Moderately dilated right ventricle with reduced function. Biatrial dilation. Mild tricuspid regurgitation. RVSP is at least 60 mmHg. Prominent pericardial fat pad. No definitive evidence of pericardial effusion. Stress test:    Pharm stress 1/2022  Gated SPECT left ventricular ejection fraction was calculated to be   74% with normal wall motion and thickening. TID ratio 1.08. Impression   1. ECG during the infusion did not change. 2.  The myocardial perfusion imaging was abnormal.   3.  The abnormality was a large sized, moderate fixed defect involving   the inferior and inferolateral wall suggestive prior infarct without   any reversibility. There was also a small sized mild perfusion defect   involving the mid to distal anterior wall suggestive ischemia. 4.  Overall left ventricular systolic function was normal without   regional wall motion abnormalities. 5.  No transient ischemic dilatation. 6.  High risk general pharmacologic stress test.     Cardiac catheterization:   [facility] 1/11/22 Diane  CONCLUSIONS:  1. Coronary artery disease:  A. Left main. No significant disease. B.  LAD. Heavily calcified proximal and mid vessel with 50% mid vessel  stenosis. 60% proximal stenosis of a moderate size first diagonal  branch. C.  LCX.   50% ostial narrowing of the AV groove branch in the mid vessel  which is a bifurcation lesion with a large marginal branch which itself  did not appear to have any significant disease. The AV groove branch of  the left circumflex continues to provide a posterior descending artery  branch and the posterolateral branch (codominant vessel). D.  RCA. Codominant vessel with no significant angiographic disease. 2.  Normal left ventricular size with hyperdynamic left ventricular  systolic function with an estimated ejection fraction of 75%. 3.  Systemic hypertension. 4.  Elevated left ventricular end-diastolic pressure.    -------------------------------------------------------------------------------------------------------------------------------------------------------------  IMPRESSION:  Persistent atrial fibrillation with RVR in setting of held beta-blocker/sepsis. AC apixaban  Diagnosed 4/2022  PAT/DCC 4/18/2022, recurrence noted 5/2022  [facility] 5/16/2022 with subsequent recurrence noted 7/2022  Acute on chronic HFpEF/right heart failure. proBNP 13,000. Net +5.6 L partially due to sepsis treatment  Mild nonobstructive coronary artery disease  Minimally elevated hs-cTnT: 52-52-43 ng/L likely chronic myocardial injury. Flat pattern not consistent with ACS  History of digoxin toxicity 7/2022 (level 3.7, treated with digoxin immune Clint); digoxin resumed 8/2022; level on current admission 10/5/2022 2.2  History of bradycardia, beta-blocker and amiodarone previously discontinued due to bradycardia  Pulmonary hypertension  Acute on chronic hypoxic/hypercapnic respiratory failure  Sepsis, pneumonia, UTI/pyelonephritis  Anemia Hgb 8.5  SHANTANU, resolved. Creatinine 1.3 -> 1.0  Hypertension  Hyperlipidemia  Type 2 diabetes A1c 6.1%  Moderate gastritis and duodenitis by EGD 7/2022  SERENA  Tobacco abuse  Obesity    RECOMMENDATIONS:  A. fib RVR in the setting of sepsis and beta-blocker held due to hypotension. Blood pressure has improved and heart rates have normalized with resumption of beta-blocker.     Continue rate control strategy for now; rhythm control will be a tall order and will require AAD, can reevaluate for rhythm control in the future once sepsis issues have resolved  Continue metoprolol succinate 100 mg twice daily  Monitor for recurrent tachyarrhythmias as well as bradycardia which in the past has prompted discontinuation of amiodarone and beta-blockers  Remain off digoxin forever  Recommend outpatient event monitor to assess for tachycardia/bradycardia  Discontinue IV fluids  Give a dose of IV furosemide today and resume oral as she is net positive more than 5 L  Change diet to low-sodium, monitor strict I's and O's  Continue apixaban for stroke risk reduction  No indication for concurrent aspirin, can stop  Aggressive risk factor modification  Further care per primary service and consultants  Outpatient follow-up with Dr. Ladan Moser  Will be available as needed, please call with questions    Thank you for allowing me to participate in your patient's care. Please feel free to contact me if you have any questions or concerns. Rianna House MD, [facility] Cardiology    NOTE: This report was transcribed using voice recognition software. Every effort was made to ensure accuracy; however, inadvertent computerized transcription errors may be present.

## 2022-10-09 NOTE — PLAN OF CARE
Problem: Discharge Planning  Goal: Discharge to home or other facility with appropriate resources  Outcome: Progressing  Flowsheets (Taken 10/8/2022 1840 by Lydia Collado RN)  Discharge to home or other facility with appropriate resources: Identify barriers to discharge with patient and caregiver     Problem: Safety - Adult  Goal: Free from fall injury  Outcome: Progressing  Flowsheets (Taken 10/8/2022 1930)  Free From Fall Injury: Instruct family/caregiver on patient safety     Problem: Skin/Tissue Integrity  Goal: Absence of new skin breakdown  Description: 1. Monitor for areas of redness and/or skin breakdown  2. Assess vascular access sites hourly  3. Every 4-6 hours minimum:  Change oxygen saturation probe site  4. Every 4-6 hours:  If on nasal continuous positive airway pressure, respiratory therapy assess nares and determine need for appliance change or resting period.   Outcome: Progressing     Problem: ABCDS Injury Assessment  Goal: Absence of physical injury  Outcome: Progressing  Flowsheets (Taken 10/8/2022 1930)  Absence of Physical Injury: Implement safety measures based on patient assessment

## 2022-10-09 NOTE — PROGRESS NOTES
Progress Note  Date:10/9/2022       Room:0513/0513-01  Patient Name:Janneth Noriega     YOB: 1950     Age:71 y.o. Subjective    Subjective:  Symptoms:  Stable. She reports shortness of breath, malaise and weakness. Diet:  Adequate intake. No nausea or vomiting. Activity level: Impaired due to weakness. Pain:  She reports no pain. Review of Systems   Constitutional:  Negative for fever. Respiratory:  Positive for shortness of breath. Gastrointestinal:  Negative for nausea and vomiting. Neurological:  Positive for weakness. All other systems reviewed and are negative. Objective         Vitals Last 24 Hours:  TEMPERATURE:  Temp  Av.1 °F (36.7 °C)  Min: 97.8 °F (36.6 °C)  Max: 98.2 °F (36.8 °C)  RESPIRATIONS RANGE: Resp  Av.4  Min: 19  Max: 27  PULSE OXIMETRY RANGE: SpO2  Av %  Min: 95 %  Max: 100 %  PULSE RANGE: Pulse  Av.7  Min: 75  Max: 140  BLOOD PRESSURE RANGE: Systolic (00MAQ), OXU:599 , Min:106 , UCZ:167   ; Diastolic (37VBH), VMQ:56, Min:58, Max:84    I/O (24Hr): Intake/Output Summary (Last 24 hours) at 10/9/2022 1633  Last data filed at 10/9/2022 1243  Gross per 24 hour   Intake --   Output 1150 ml   Net -1150 ml     Objective:  General Appearance:  Ill-appearing. Vital signs: (most recent): Blood pressure 116/84, pulse 87, temperature 98 °F (36.7 °C), temperature source Oral, resp. rate 27, height 5' (1.524 m), weight 200 lb (90.7 kg), SpO2 100 %, not currently breastfeeding. Vital signs are normal.  No fever. Output: Producing urine and producing stool. HEENT: Normal HEENT exam.    Lungs:  Normal effort and normal respiratory rate. There are decreased breath sounds. Heart: Irregular rhythm. Extremities: There is dependent edema. Neurological: Patient is alert. Skin:  Warm and dry.     Labs/Imaging/Diagnostics    Labs:  CBC:  Recent Labs     10/07/22  0444 10/08/22  0453   WBC 3.7* 3.1*   RBC 3.38* 3.17*   HGB 9.1* 8.5*   HCT 31.5* 28.9*   MCV 93.2 91.2   RDW 15.5* 15.0    136     CHEMISTRIES:  Recent Labs     10/07/22  0444 10/08/22  0453    144   K 3.7 4.0    106   CO2 34* 34*   BUN 24* 21   CREATININE 1.0 1.0   GLUCOSE 149* 174*   PHOS 2.8 3.0   MG 1.8 1.9     PT/INR:No results for input(s): PROTIME, INR in the last 72 hours. APTT:No results for input(s): APTT in the last 72 hours. LIVER PROFILE:  Recent Labs     10/07/22  0444 10/08/22  0453   AST 8 7   ALT 7 5   BILITOT 0.5 0.4   ALKPHOS 71 74       Imaging Last 24 Hours:  CT HEAD WO CONTRAST    Result Date: 10/9/2022  EXAMINATION: CT OF THE HEAD WITHOUT CONTRAST  10/9/2022 12:47 pm TECHNIQUE: CT of the head was performed without the administration of intravenous contrast. Automated exposure control, iterative reconstruction, and/or weight based adjustment of the mA/kV was utilized to reduce the radiation dose to as low as reasonably achievable. COMPARISON: 07/05/2022 HISTORY: ORDERING SYSTEM PROVIDED HISTORY: facial droop TECHNOLOGIST PROVIDED HISTORY: Reason for exam:->facial droop Has a \"code stroke\" or \"stroke alert\" been called? ->No FINDINGS: BRAIN/VENTRICLES: There is no acute intracranial hemorrhage, mass effect or midline shift. No abnormal extra-axial fluid collection. The gray-white differentiation is maintained without evidence of an acute infarct. There is no evidence of hydrocephalus. ORBITS: The visualized portion of the orbits demonstrate no acute abnormality. There are signs of bilateral lens replacement SINUSES: The visualized paranasal sinuses and mastoid air cells demonstrate no acute abnormality. SOFT TISSUES/SKULL:  No acute abnormality of the visualized skull or soft tissues. No acute intracranial abnormality.      XR CHEST PORTABLE    Result Date: 10/8/2022  EXAMINATION: ONE XRAY VIEW OF THE CHEST 10/8/2022 7:04 am COMPARISON: October 7, 2022 HISTORY: ORDERING SYSTEM PROVIDED HISTORY: SOB TECHNOLOGIST PROVIDED HISTORY: Reason for exam:->SOB FINDINGS: Interstitial and hazy opacities bilaterally notable in right lung base silhouetting right hemidiaphragm. Mild cardiomegaly. No pneumothorax. Redemonstration of interstitial pulmonary edema or pneumonia bilaterally appearing to have increased in right lung base. US DUP UPPER EXTREMITY LEFT VENOUS    Result Date: 10/8/2022  EXAMINATION: VENOUS ULTRASOUND OF THE LEFT UPPER EXTREMITY, 10/8/2022 6:54 pm TECHNIQUE: Duplex ultrasound using B-mode/gray scaled imaging and Doppler spectral analysis and color flow was obtained of the deep venous structures of the left upper extremity. COMPARISON: None. HISTORY: ORDERING SYSTEM PROVIDED HISTORY: r/o DVT TECHNOLOGIST PROVIDED HISTORY: Reason for exam:->r/o DVT What reading provider will be dictating this exam?->CRC FINDINGS: There is normal flow and compressibility of the visualized venous structures. There is no evidence of echogenic thrombus. The veins demonstrate good compressibility with normal color flow study and spectral analysis. A technically difficult examination. No evidence of DVT. RECOMMENDATIONS: Unavailable     Assessment//Plan           Hospital Problems             Last Modified POA    * (Principal) Sepsis ([address] 10/5/2022 Yes     Assessment:    Condition: In stable condition. Improving. (No fever,  HR better doing better). Plan:   Consults: cardiology and pulmonology. Regular diet. (Continue antibiotics  Stop IV fluids  Po lasix  Plan to switch to PO antibiotics and discharge, hopefully tomorrow  Continue metoprolol  Check labs).      Electronically signed by Alessio Yin DO on 10/9/22 at 4:33 PM EDT

## 2022-10-10 VITALS
TEMPERATURE: 96.8 F | BODY MASS INDEX: 39.27 KG/M2 | HEIGHT: 60 IN | OXYGEN SATURATION: 98 % | WEIGHT: 200 LBS | DIASTOLIC BLOOD PRESSURE: 59 MMHG | HEART RATE: 79 BPM | SYSTOLIC BLOOD PRESSURE: 125 MMHG | RESPIRATION RATE: 18 BRPM

## 2022-10-10 LAB
BLOOD CULTURE, ROUTINE: NORMAL
CULTURE, BLOOD 2: NORMAL
METER GLUCOSE: 134 MG/DL (ref 74–99)
METER GLUCOSE: 194 MG/DL (ref 74–99)
SARS-COV-2, NAAT: NOT DETECTED

## 2022-10-10 PROCEDURE — 87635 SARS-COV-2 COVID-19 AMP PRB: CPT

## 2022-10-10 PROCEDURE — 6370000000 HC RX 637 (ALT 250 FOR IP): Performed by: FAMILY MEDICINE

## 2022-10-10 PROCEDURE — 6370000000 HC RX 637 (ALT 250 FOR IP): Performed by: INTERNAL MEDICINE

## 2022-10-10 PROCEDURE — 2700000000 HC OXYGEN THERAPY PER DAY

## 2022-10-10 PROCEDURE — 97530 THERAPEUTIC ACTIVITIES: CPT

## 2022-10-10 PROCEDURE — 94660 CPAP INITIATION&MGMT: CPT

## 2022-10-10 PROCEDURE — 6360000002 HC RX W HCPCS: Performed by: INTERNAL MEDICINE

## 2022-10-10 PROCEDURE — 94640 AIRWAY INHALATION TREATMENT: CPT

## 2022-10-10 PROCEDURE — 6370000000 HC RX 637 (ALT 250 FOR IP)

## 2022-10-10 PROCEDURE — 2580000003 HC RX 258: Performed by: INTERNAL MEDICINE

## 2022-10-10 PROCEDURE — 97110 THERAPEUTIC EXERCISES: CPT

## 2022-10-10 PROCEDURE — 82962 GLUCOSE BLOOD TEST: CPT

## 2022-10-10 PROCEDURE — 97535 SELF CARE MNGMENT TRAINING: CPT

## 2022-10-10 RX ORDER — LORAZEPAM 0.5 MG/1
0.5 TABLET ORAL EVERY 8 HOURS PRN
Status: DISCONTINUED | OUTPATIENT
Start: 2022-10-10 | End: 2022-10-10 | Stop reason: HOSPADM

## 2022-10-10 RX ORDER — POLYETHYLENE GLYCOL 3350 17 G/17G
17 POWDER, FOR SOLUTION ORAL DAILY
Status: DISCONTINUED | OUTPATIENT
Start: 2022-10-10 | End: 2022-10-10 | Stop reason: HOSPADM

## 2022-10-10 RX ORDER — LORAZEPAM 0.5 MG/1
0.5 TABLET ORAL EVERY 8 HOURS PRN
Qty: 120 TABLET | Refills: 5 | Status: SHIPPED | OUTPATIENT
Start: 2022-10-10 | End: 2022-11-09

## 2022-10-10 RX ORDER — POLYETHYLENE GLYCOL 3350 17 G/17G
17 POWDER, FOR SOLUTION ORAL DAILY PRN
Qty: 527 G | Refills: 0 | Status: SHIPPED | OUTPATIENT
Start: 2022-10-10 | End: 2022-11-09

## 2022-10-10 RX ORDER — METOPROLOL SUCCINATE 100 MG/1
100 TABLET, EXTENDED RELEASE ORAL 2 TIMES DAILY
Qty: 30 TABLET | Refills: 3 | Status: SHIPPED | OUTPATIENT
Start: 2022-10-10

## 2022-10-10 RX ORDER — FUROSEMIDE 40 MG/1
40 TABLET ORAL DAILY
Qty: 60 TABLET | Refills: 3 | Status: SHIPPED | OUTPATIENT
Start: 2022-10-11

## 2022-10-10 RX ORDER — SULFAMETHOXAZOLE AND TRIMETHOPRIM 800; 160 MG/1; MG/1
1 TABLET ORAL 2 TIMES DAILY
Qty: 10 TABLET | Refills: 0 | Status: ON HOLD | OUTPATIENT
Start: 2022-10-10 | End: 2022-10-19 | Stop reason: HOSPADM

## 2022-10-10 RX ADMIN — INSULIN GLARGINE 13 UNITS: 100 INJECTION, SOLUTION SUBCUTANEOUS at 09:40

## 2022-10-10 RX ADMIN — CARBIDOPA AND LEVODOPA 2 TABLET: 25; 100 TABLET, EXTENDED RELEASE ORAL at 09:40

## 2022-10-10 RX ADMIN — POLYETHYLENE GLYCOL 3350 17 G: 17 POWDER, FOR SOLUTION ORAL at 09:27

## 2022-10-10 RX ADMIN — BUDESONIDE 500 MCG: 0.5 SUSPENSION RESPIRATORY (INHALATION) at 06:09

## 2022-10-10 RX ADMIN — ACETAMINOPHEN 650 MG: 325 TABLET ORAL at 05:54

## 2022-10-10 RX ADMIN — CEFEPIME 2000 MG: 2 INJECTION, POWDER, FOR SOLUTION INTRAVENOUS at 09:26

## 2022-10-10 RX ADMIN — METOPROLOL SUCCINATE 100 MG: 50 TABLET, EXTENDED RELEASE ORAL at 09:31

## 2022-10-10 RX ADMIN — APIXABAN 5 MG: 5 TABLET, FILM COATED ORAL at 09:31

## 2022-10-10 RX ADMIN — ARFORMOTEROL TARTRATE 15 MCG: 15 SOLUTION RESPIRATORY (INHALATION) at 06:09

## 2022-10-10 RX ADMIN — FUROSEMIDE 40 MG: 40 TABLET ORAL at 09:31

## 2022-10-10 RX ADMIN — CARBIDOPA AND LEVODOPA 2 TABLET: 25; 100 TABLET, EXTENDED RELEASE ORAL at 14:11

## 2022-10-10 ASSESSMENT — PAIN DESCRIPTION - ORIENTATION: ORIENTATION: LEFT

## 2022-10-10 ASSESSMENT — PAIN SCALES - GENERAL
PAINLEVEL_OUTOF10: 5
PAINLEVEL_OUTOF10: 5

## 2022-10-10 ASSESSMENT — ENCOUNTER SYMPTOMS
NAUSEA: 0
SHORTNESS OF BREATH: 1
VOMITING: 0

## 2022-10-10 ASSESSMENT — PAIN DESCRIPTION - DESCRIPTORS: DESCRIPTORS: DISCOMFORT

## 2022-10-10 ASSESSMENT — PAIN DESCRIPTION - LOCATION
LOCATION: HIP;BACK
LOCATION: HIP

## 2022-10-10 NOTE — CONSULTS
CHF NURSE NAVIGATOR CONSULT NOTE:    Patient currently admitted with diagnosis of Diastolic heart failure. Patient was alert and awake sitting up in bed during the consultation. She was engaged and asked appropriate questions throughout the education session. She is agreeable to symptom monitoring and low sodium diet, following up with [facility] at the next available appointment in November. Scheduling with the CHF clinic Yes. Patient to follow with provider at [facility] upon discharge to [facility]. Future Appointments   Date Time Provider Prateek Nassar   10/18/2022  2:00 PM [facility] CHF ROOM 1 [facility]   11/16/2022  2:30 PM Carolina Lay MD [facility] CARDIO [facility]       Barriers to Care:  Contributing risk factors for Heart Failure are identified as impairment:  physical: mobility, overwhelmed by complexity of regimen, and multiple co-morbidities. Patient admitted with sepsis and an acute on chronic CHF from treatment. Patient is a long time resident at [facility]. Pt states she is weighed everyday and monitors herself for leg swelling and sob and notifies nursing staff with symptoms. She states she also sticks to a low salt diet and was able to state foods to avoid. Patient would like to come to the CHF clinic. CHW Ion Sellers consulted to verify transportation. The patient is ordered:  Diet: ADULT ORAL NUTRITION SUPPLEMENT; Lunch, Dinner; Fortified Pudding Oral Supplement  ADULT DIET; Dysphagia - Pureed;  Low Sodium (2 gm); Mildly Thick (Nectar)   Sodium controlled diet Yes  Fluid restriction daily ordered (fluid restriction recommended if patient is hyponatremic and/or diuretic is initiated or increased) No  FR:   Daily Weights: Patient Vitals for the past 96 hrs (Last 3 readings):   Weight   10/07/22 0848 200 lb (90.7 kg)     I/O:   Intake/Output Summary (Last 24 hours) at 10/10/2022 1508  Last data filed at 10/10/2022 0858  Gross per 24 hour   Intake 240 ml   Output 1150 ml   Net -910 ml              We reviewed the introduction to Heart Failure, the HF zones, signs and symptoms to report on day 1 of onset, medications, medication compliance, the importance of obtaining daily weights, following a low sodium diet, reading food labels for the sodium content, keeping physician appointments, and smoking cessation. We discussed writing / tracking daily weights on a calendar / log because a 5 pound gain in 1 week can sneak up if you are not tracking it. I advised patient they can reduce the risk for Heart Failure exacerbations by modifying / controlling the risk factors. We discussed self-managed care which includes the following:  to take medications as prescribed, report any intolerable side effects of medications to the cardiologist / doctor, do not just stop taking the medication; follow a cardiac heart healthy / low sodium diet; weigh yourself daily, exercise regularly- per doctor recommendation and not to smoke or use an excess amount of alcohol. We discussed calling the cardiologist / doctor with a weight gain of 3 pounds in one day or a total of 5 pounds or more in one week. Also, if you should have a significant weight loss of 3# or more in one day to call the doctor, they may need to decrease or hold the diuretic dose. On days you feels nauseated and not eating / drinking, having emesis or diarrhea,  informed to call the cardiologist  / doctor, they may need to decrease or hold diuretic to avoid dehydration. I stressed the importance of informing their cardiologist the first day of onset of any of the signs and symptoms in the \"Yellow Zone\" of the HF Zones. Patient verbalizes understanding. Greater than 30 minutes was spent educating patient. The Heart Failure Booklet given to the patient with additional patient education addressing:  What is Heart Failure?   Things You Can Do to Live Well with HF  How to Take Your Medications  How to Eat Less [facility] its Salt? Exercising Well with Heart Failure  Signs and symptoms of HF to report  Weight Yourself Each Day  Home Self Management- activity, weight tracking, taking medications as prescribed, meals /diet planning (sodium and fluid restriction), how to read food labels, keeping physician follow ups, smoking cessation, follow the Heart Failure Zones  The Heart Failure zones  Every Dose Every Day      Instructed  to call 911 if you have any of the following symptoms:       Struggling to breathe unrelieved with rest,     Having chest pain     Have confusion or cant think clearly    Symone Angelo MSN, RN  Heart Failure [address] (CHF) AHA GUIDELINES  (Must be completed for Primary Diagnosis CHF or History of CHF)    Discharge Plan:  I placed the Heart Failure Home Instructions in patient's discharge instructions. Per Heart Failure GWT[facility], the patient should have a follow-up appointment made within 7 days of discharge.     New Diagnosis No    ECHO Results most recent: 7/7/2022 70-75%  Lab Results   Component Value Date    LVEF 73 07/07/2022                                        Social History     Tobacco Use   Smoking Status Never   Smokeless Tobacco Never        Immunization History   Administered Date(s) Administered    COVID-19, PFIZER GRAY top, DO NOT Dilute, (age 15 y+), IM, 30 mcg/0.3 mL 05/24/2022    Influenza Vaccine, unspecified formulation 10/15/2014    Influenza Virus Vaccine 10/31/2008, 10/05/2011    Influenza, High Dose (Fluzone 65 yrs and older) 09/22/2015, 11/21/2016, 09/19/2017, 09/25/2019    Influenza, Triv, inactivated, subunit, adjuvanted, IM (Fluad 65 yrs and older) 01/03/2019    Pneumococcal Conjugate 13-valent ([id number]) 05/27/2016    Pneumococcal Polysaccharide ([id number]) 12/21/2012, 01/29/2018    Td, unspecified formulation 03/11/2014    Tdap (Boostrix, Adacel) 09/30/2015    Zoster Live (Zostavax) 06/25/2013    Zoster Recombinant (Shingrix) 12/10/2019 HFpEF  Angiotensin-Converting-Enzyme (ACE) inhibitor ordered:  [] Yes  [x] No (specify contraindication):    [] Contraindicated  [] Hypotensive patient who was at immediate risk of cardiogenic shock  [] Hospitalized patient who experienced marked azotemia  [] Other Contraindications  [x] Not Eligible  [] Not Tolerant  [] Patient Reason  [] System Reason  [] Other Reason    Angiotensin II receptor blockers (ARB) ordered:  [] Yes  [x] No (specify contraindication):    [] Contraindicated  [] Hypotensive patient who was at immediate risk of cardiogenic shock  [] Hospitalized patient who experienced marked azotemia  [] Other Contraindications    ARNI - Angiotensin Receptor Neprilysin Inhibitor ordered:  [] Yes  [x] No (specify contraindication):    [] ACE inhibitor use within the prior 36 hours  [] Allergy  [] Hyperkalemia  [] Hypotension  [] Renal dysfunction defined as creatinine > 2.5 mg/dL in men or > 2.0 mg/dL in women  [] Other Contraindications  [x] Not Eligible  [] Not Tolerant  [] Patient Reason  []System Reason  []Other Reason      Beta Blocker ordered:    [x] Yes Toprol XL   [] No (specify contraindication):    [] Contraindicated  [] Asthma  [] Fluid Overload  [] Low Blood Pressure  [] Patient recently treated with an intravenous positive inotropic agent  [] Other Contraindications  [] Not Eligible  [] Not Tolerant  [] Patient Reason  [] System Reason    SGLT2 Inhibitor ordered:  [] Yes  [x] No (specify contraindication):    [] Contraindicated  [] Patient currently on dialysis  [] Ketoacidosis  [] Known hypersensitivity to the medication  [] Type I diabetes (not approved for use in patients with Type I diabetes due to increased risk of ketoacidosis)  [] Other Contraindications  [] Not Eligible  [] Not Tolerant  [] Patient Reason  [] System Reason  [x] Other Reason    Aldosterone Antagonist ordered:  [] Yes  [x] No (specify contraindication):    [] Contraindicated  [] Allergy due to aldosterone receptor antagonist  [] Hyperkalemia  [] Renal dysfunction defined as creatinine >2.5 mg/dL in men or >2.0 mg/dL in women.   [] Other contraindications  [x] Not Eligible  [] Not Tolerant  [] Patient Reason  [] System Reason  [] Other Reason

## 2022-10-10 NOTE — PLAN OF CARE
Problem: Discharge Planning  Goal: Discharge to home or other facility with appropriate resources  Outcome: Adequate for Discharge     Problem: Safety - Adult  Goal: Free from fall injury  Outcome: Adequate for Discharge     Problem: Skin/Tissue Integrity  Goal: Absence of new skin breakdown  Description: 1. Monitor for areas of redness and/or skin breakdown  2. Assess vascular access sites hourly  3. Every 4-6 hours minimum:  Change oxygen saturation probe site  4. Every 4-6 hours:  If on nasal continuous positive airway pressure, respiratory therapy assess nares and determine need for appliance change or resting period.   Outcome: Adequate for Discharge     Problem: ABCDS Injury Assessment  Goal: Absence of physical injury  Outcome: Adequate for Discharge     Problem: Chronic Conditions and Co-morbidities  Goal: Patient's chronic conditions and co-morbidity symptoms are monitored and maintained or improved  Outcome: Adequate for Discharge     Problem: Nutrition Deficit:  Goal: Optimize nutritional status  Outcome: Adequate for Discharge     Problem: Infection - Adult  Goal: Absence of infection at discharge  Outcome: Adequate for Discharge  Goal: Absence of infection during hospitalization  Outcome: Adequate for Discharge     Problem: Cardiovascular - Adult  Goal: Maintains optimal cardiac output and hemodynamic stability  Outcome: Adequate for Discharge     Problem: Cardiovascular - Adult  Goal: Maintains optimal cardiac output and hemodynamic stability  Outcome: Adequate for Discharge     Problem: Musculoskeletal - Adult  Goal: Return mobility to safest level of function  Outcome: Adequate for Discharge  Goal: Maintain proper alignment of affected body part  Outcome: Adequate for Discharge  Goal: Return ADL status to a safe level of function  Outcome: Adequate for Discharge     Problem: Genitourinary - Adult  Goal: Urinary catheter remains patent  Outcome: Adequate for Discharge     Problem: Pain  Goal: Verbalizes/displays adequate comfort level or baseline comfort level  Outcome: Adequate for Discharge

## 2022-10-10 NOTE — PROGRESS NOTES
OT BEDSIDE TREATMENT NOTE      Date:10/10/2022  Patient Name: Dena Severin  MRN: [id number]  : 1950  Room: [address] [place] [address]      Evaluating OT: Brunner Villarreal OTR/L #[id number]      Referring Provider and Specific Provider Orders/Date:      10/06/22 1515   OT eval and treat  Start:  10/06/22 1515,   End:  10/06/22 1515,   ONE TIME,   Standing Count:  1 Occurrences,   Jean-Pierre Buchanan MD       Placement Recommendation: Subacute Rehab         Diagnosis:   1. Septicemia ([facility] [address]    2. Acute respiratory failure with hypoxia and hypercapnia (HCC)    3.  Urinary tract infection in female         Surgery:  None      Pertinent Medical History:       Past Medical History        Past Medical History:   Diagnosis Date    A-fib ([facility] [address]      Acute on chronic congestive heart failure (HCC)      Anxiety      Asthma      CAD (coronary artery disease) 2016    Cancer ([facility] [address]  breast ca 2006     right lumpectomy    Chronic kidney disease       nephrolithiasis    Depression      Diabetes mellitus ([facility] [address]      H/O mammogram      Hx MRSA infection       toe infection 2012    Hyperlipidemia      Hypertension      Lateral epicondylitis      SERENA on CPAP      Parkinson's disease ([facility] [address]      Tubal ligation status              Past Surgical History         Past Surgical History:   Procedure Laterality Date    BREAST LUMPECTOMY        BREAST REDUCTION SURGERY        CARDIAC CATHETERIZATION   2014     Dr. Bharati Genao   2022     Dr. Darron Dawson   7/29/15    CT GUIDED CHEST TUBE   2022     CT GUIDED CHEST TUBE 2022 Ricardo Brady MD [facility]    ENDOSCOPY, COLON, DIAGNOSTIC   7/19/15    GALLBLADDER SURGERY        LUMBAR LAMINECTOMY        TOE AMPUTATION        TONSILLECTOMY        UPPER GASTROINTESTINAL ENDOSCOPY        UPPER GASTROINTESTINAL ENDOSCOPY N/A 2022     EGD ESOPHAGOGASTRODUODENOSCOPY performed by Riki Mcghee MD at [address] [place] [address] Precautions:  Fall Risk, O2 , incontinence of bowel and bladder, purewick      Assessment of current deficits    [x] Functional mobility            [x]ADLs           [x] Strength                   []Cognition    [x] Functional transfers          [x] IADLs          [x] Safety Awareness   [x]Endurance    [] Fine Coordination              [x] Balance      [] Vision/perception    []Sensation      []Gross Motor Coordination  [] ROM           [] Delirium                   [] Motor Control      OT PLAN OF CARE   OT POC based on physician orders, patient diagnosis and results of clinical assessment     Frequency/Duration 1-3 days/wk for 2 weeks PRN      Specific OT Treatment Interventions to include:   * Instruction/training on adapted ADL techniques and AE recommendations to increase functional independence within precautions       * Training on energy conservation strategies, correct breathing pattern and techniques to improve independence/tolerance for self-care routine  * Functional transfer/mobility training/DME recommendations for increased independence, safety, and fall prevention  * Patient/Family education to increase follow through with safety techniques and functional independence  * Recommendation of environmental modifications for increased safety with functional transfers/mobility and ADLs  * Therapeutic exercise to improve motor endurance, ROM, and functional strength for ADLs/functional transfers  * Therapeutic activities to facilitate/challenge dynamic balance, stand tolerance for increased safety and independence with ADLs  * Therapeutic activities to facilitate gross/fine motor skills for increased independence with ADLs  * Positioning to improve skin integrity, interaction with environment and functional independence     Recommended Adaptive Equipment: TBD      Home Living: Pt presented from skilled nursing facility where patient resides.          Equipment owned: nursing home equipment      Prior Level of Function: Pt reports being mostly independent with ADLs but has assist as needed ; ambulated independently with walker. Uses wheelchair for distance mobility.       Pain Level: pt denied pain             Cognition: A&O: 4/4; Follows 2-3 step directions              Memory: fair               Sequencing: fair               Problem solving: fair               Judgement/safety: fair      [facility]   AM-PAC Daily Activity Inpatient   How much help for putting on and taking off regular lower body clothing?: Total  How much help for Bathing?: A Lot  How much help for Toileting?: Total  How much help for putting on and taking off regular upper body clothing?: A Little  How much help for taking care of personal grooming?: A Little  How much help for eating meals?: A Little  AM-PAC Inpatient Daily Activity Raw Score: 13                Functional Assessment:     Initial Eval Status  Date: 10/7/22    Treatment Status  Date:10/10/22 STGs = LTGs  Time frame: 10-14 days   Feeding Supervision     N/T  Independent    Grooming Minimal Assist    Pt able to manage containers and rinse mouth with mouthwash when seated EOB; pt's dentures are at her daughter's house; pt able to comb hair with pick when seated EOB; set up assist as pt washed face when seated EOB Supervision    UB Dressing Moderate Assist    Min A to change gowns when pt seated EOB; pt able to thread B UE's through gown; assist to thread monitor lines through gown and tie/untie back of gown  Supervision    LB Dressing Dependent      Dep to change socks when seated EOB Minimal Assist    Bathing Moderate/Maximal Assist      Mod A; pt able to wash UB and proximal LE's when seated EOB; assist required to wash back and distal LE's and feet when seated EOB; assist for pericare and rear hygiene Minimal Assist    Toileting Dependent   For mgmt of genao catheter      Dep 2* purewick catheter and pt requiring assist for pericare ; pt was also incontinent of bowel, requiring assist for rear hygiene upon side rolling in bed Moderate Assist    Bed Mobility  Supine to sit: Minimal Assist  Sit to supine: Not Assessed     Min A for supine to sit with assist to guide UB to sitting; pt able to support B LE's to EOB; scooting at Min A; sit to supine at [address] A with assist to support LE's to supine; side rolling at Min/mod A to adjust linens and for hygiene Supine to sit: Independent   Sit to supine: Independent    Functional Transfers Sit to stand: Minimal Assist   Stand to sit: Minimal Assist      Transfer training with verbal cues for hand placement throughout session to improve safety. N/T; pt declined at this time 2* to \"not feeling strong enough\" Independent   Functional Mobility Minimal Assist with wheeled walker to improve balance short distances at bedside, verbal cues for walker sequence and safety. N/T Moderate [place] with use of wheeled walker    Balance Sitting:     Static: fair plus    Dynamic: fair  Standing: fair with walker     Sitting:     Static: fair plus (at EOB) SBA    Dynamic: fair at EOB with SBA/Min A  Standing: N/T Sitting:     Static: good    Dynamic: good  Standing: fair plus with walker   Activity Tolerance fair    fair Increase standing tolerance >3 minutes for improved engagement with functional transfers and indep in ADLs      Visual/  Perceptual Glasses: Yes; not present at bedside      Reports changes in vision since admission: No       NA       Hand Dominance: Right        AROM (PROM) Strength Additional Info:  Goal:   RUE  WFL 3+/5 good  and wfl FMC/dexterity noted during ADL tasks    Improve overall RUE strength  for participation in functional tasks   LUE WFL 3+/5 good  and wfl FMC/dexterity noted during ADL tasks    Improve overall LUE strength  for participation in functional tasks      Hearing: [facility]   Sensation:  No c/o numbness or tingling  Tone:  WFL   Edema: None noted     Comments: Patient cleared by nursing staff.   Upon arrival pt supine in bed with HOB partially elevated. Pt agreeable to OT tx session. Pt educated with regards to bed mobility, hand placement, safety awareness, static sitting balance,   ADL retraining,  grooming tasks, UE/LE bathing, LE/UE dressing, toileting/hygiene, ECT's. At end of session pt supine in bed with HOB partially elevated  with all lines and tubes intact, call light within reach. Overall, pt demonstrated decreased independence and safety during completion of ADL/functional transfers/mobility tasks. Pt would benefit from continued skilled OT to increase safety and independence with completion of ADL/IADL tasks for functional independence and quality of life. Pt required cues and education as noted above for safe facilitation and completion of tasks. Therapist provided skilled monitoring of patient's response during treatment session. Prior to and at the end of session, environmental modifications / line management completed for patients safety and efficiency of treatment session. Overall, patient demonstrates moderate difficulties with completion of BADLs and IADLs. Factors contributing to these difficulties include bowel and bladder incontinence, decreased endurance, and generalized weakness. As noted above, patient likely to benefit from further OT intervention to increase independence, safety, and overall quality of life. Treatment:     Bed mobility: Facilitated bed mobility with cues for proper body mechanics and sequencing to prepare for ADL completion. ADL completion: Self-care retraining for the above-mentioned ADLs; training on proper hand placement, safety technique, sequencing, and energy conservation techniques. Postural Balance: Sitting balance retraining to improve righting reactions with postural changes during ADLs.   Skilled positioning: Proper positioning to improve interaction with environment, overall functioning     Pt has made fair progress towards set goals   OT 1-3 days/wk for 2 weeks PRN     Treatment Time also includes thorough review of current medical information, gathering information on past medical history/social history and prior level of function, informal observation of tasks, assessment of data and education on plan of care and goals.     Treatment Time In:   1:00 PM     Treatment Time Out: 1:42 PM            Treatment Charges: Mins Units   ADL/Home Mgt     53422 32 2   Thera Activities     62741 10 1   Ther Ex                 [id number]       Manual Therapy    95088     Neuro Re-ed         [id number]     Orthotic manage/training                               43963     Non Billable Time     Total Timed Treatment [address], GELLER/L #[id number]

## 2022-10-10 NOTE — PROGRESS NOTES
[facility] called and notified of patients hospital follow up appointments with Dr. Mario Butler on Nov 16 2022 and the CHF clinic on 10/18 at 2pm.

## 2022-10-10 NOTE — CARE COORDINATION
10/10/22 1218 3L nc vs bipap FIO2 35%. Pt was a long term resident at [facility], but since pt has been participating in therapy Domitila Taveras is going to start precert today (once updated therapy notes are on chart) to try to get patient to facility skilled; however PT CAN RETURN  [address] PENDING. WILL NEED RAPID COVID DAY OF DISCHARGE, SIGNED [place], AND DC ORDER. Pt currently on IV cefepime but per notes plan for po ATB at dc. Electronically signed by Reinaldo Gusman RN on 10/10/2022 at 12:24 PM    Update: 10/10/22 1414 CM note: Discharge order noted. NOtified Domitila Taveras, [facility] liaison, of pts need for 3L nc and bipap HS. D[place] is signed. Obtained order for rapid covid. Ambulance transport arranged with [facility] for  at 3pm. Ambulance form on soft chart. Notified pt. Pts daughter (at the bedside), pts LORNA Glover, charge nurse Keerthi Obrien, and [facility] of pts  time.  Electronically signed by Reinaldo Gusman RN on 10/10/2022 at 2:20 PM

## 2022-10-10 NOTE — PLAN OF CARE
Patient's chart updated to reflect:      . - HF care plan, HF education points and HF discharge instructions.  -Orders: 2 gram sodium diet, daily weights, I/O.  -PCP and cardiology follow up appointments to be scheduled within 7 days of hospital discharge. -CHF education session will be provided to the patient prior to hospital discharge.   Patience Montanez MSN, RN  Heart Failure Navigator

## 2022-10-10 NOTE — PROGRESS NOTES
Progress Note  Date:10/10/2022       Room:0513/0513-01  Patient Name:Janneth Nguyễn     YOB: 1950     Age:71 y.o. Subjective    Subjective:  Symptoms:  Stable. She reports malaise and weakness. Diet:  Adequate intake. No nausea or vomiting. Activity level: Impaired due to weakness. Pain:  She reports no pain. Review of Systems   Constitutional:  Negative for fever. Gastrointestinal:  Negative for nausea and vomiting. Neurological:  Positive for weakness. All other systems reviewed and are negative. Objective         Vitals Last 24 Hours:  TEMPERATURE:  Temp  Av.6 °F (36.4 °C)  Min: 96.8 °F (36 °C)  Max: 98.2 °F (36.8 °C)  RESPIRATIONS RANGE: Resp  Av.3  Min: 16  Max: 27  PULSE OXIMETRY RANGE: SpO2  Av.9 %  Min: 97 %  Max: 100 %  PULSE RANGE: Pulse  Av.7  Min: 75  Max: 94  BLOOD PRESSURE RANGE: Systolic (88SPG), VPU:726 , Min:91 , DXM:776   ; Diastolic (52JQA), AMX:23, Min:51, Max:84    I/O (24Hr): Intake/Output Summary (Last 24 hours) at 10/10/2022 1333  Last data filed at 10/10/2022 0858  Gross per 24 hour   Intake 240 ml   Output 1150 ml   Net -910 ml     Objective:  General Appearance:  Ill-appearing. Vital signs: (most recent): Blood pressure (!) 125/59, pulse 79, temperature 96.8 °F (36 °C), temperature source Axillary, resp. rate 18, height 5' (1.524 m), weight 200 lb (90.7 kg), SpO2 98 %, not currently breastfeeding. Vital signs are normal.  No fever. Output: Producing urine and producing stool. HEENT: Normal HEENT exam.    Lungs:  Normal effort and normal respiratory rate. Heart: Irregular rhythm. Abdomen: Abdomen is soft. Bowel sounds are normal.     Extremities: Decreased range of motion. Neurological: Patient is alert. Skin:  Warm and dry.     Labs/Imaging/Diagnostics    Labs:  CBC:  Recent Labs     10/08/22  0453   WBC 3.1*   RBC 3.17*   HGB 8.5*   HCT 28.9*   MCV 91.2   RDW 15.0        CHEMISTRIES:  Recent Labs     10/08/22  0453      K 4.0      CO2 34*   BUN 21   CREATININE 1.0   GLUCOSE 174*   PHOS 3.0   MG 1.9     PT/INR:No results for input(s): PROTIME, INR in the last 72 hours. APTT:No results for input(s): APTT in the last 72 hours. LIVER PROFILE:  Recent Labs     10/08/22  0453   AST 7   ALT 5   BILITOT 0.4   ALKPHOS 74       Imaging Last 24 Hours:  CT HEAD WO CONTRAST    Result Date: 10/9/2022  EXAMINATION: CT OF THE HEAD WITHOUT CONTRAST  10/9/2022 12:47 pm TECHNIQUE: CT of the head was performed without the administration of intravenous contrast. Automated exposure control, iterative reconstruction, and/or weight based adjustment of the mA/kV was utilized to reduce the radiation dose to as low as reasonably achievable. COMPARISON: 07/05/2022 HISTORY: ORDERING SYSTEM PROVIDED HISTORY: facial droop TECHNOLOGIST PROVIDED HISTORY: Reason for exam:->facial droop Has a \"code stroke\" or \"stroke alert\" been called? ->No FINDINGS: BRAIN/VENTRICLES: There is no acute intracranial hemorrhage, mass effect or midline shift. No abnormal extra-axial fluid collection. The gray-white differentiation is maintained without evidence of an acute infarct. There is no evidence of hydrocephalus. ORBITS: The visualized portion of the orbits demonstrate no acute abnormality. There are signs of bilateral lens replacement SINUSES: The visualized paranasal sinuses and mastoid air cells demonstrate no acute abnormality. SOFT TISSUES/SKULL:  No acute abnormality of the visualized skull or soft tissues. No acute intracranial abnormality. US DUP UPPER EXTREMITY LEFT VENOUS    Result Date: 10/8/2022  EXAMINATION: VENOUS ULTRASOUND OF THE LEFT UPPER EXTREMITY, 10/8/2022 6:54 pm TECHNIQUE: Duplex ultrasound using B-mode/gray scaled imaging and Doppler spectral analysis and color flow was obtained of the deep venous structures of the left upper extremity. COMPARISON: None.  HISTORY: ORDERING SYSTEM PROVIDED HISTORY: r/o DVT TECHNOLOGIST PROVIDED HISTORY: Reason for exam:->r/o DVT What reading provider will be dictating this exam?->CRC FINDINGS: There is normal flow and compressibility of the visualized venous structures. There is no evidence of echogenic thrombus. The veins demonstrate good compressibility with normal color flow study and spectral analysis. A technically difficult examination. No evidence of DVT. RECOMMENDATIONS: Unavailable     Assessment//Plan           Hospital Problems             Last Modified POA    * (Principal) Sepsis ([facility] [address] 10/5/2022 Yes     Assessment:    Condition: In stable condition. Improving. (Doing well, back to baseline    ). Plan:   Consults: pulmonology. Regular diet. (Will discharge today  ).      Electronically signed by Yunier Valverde DO on 10/10/22 at 1:33 PM EDT

## 2022-10-10 NOTE — PLAN OF CARE
Problem: Discharge Planning  Goal: Discharge to home or other facility with appropriate resources  10/10/2022 0018 by Steve Maxwell RN  Outcome: Progressing  10/9/2022 1317 by Devora Park RN  Outcome: Progressing  Flowsheets (Taken 10/9/2022 0806)  Discharge to home or other facility with appropriate resources: Identify barriers to discharge with patient and caregiver     Problem: Safety - Adult  Goal: Free from fall injury  10/10/2022 0018 by Steve Maxwell RN  Outcome: Progressing  Flowsheets (Taken 10/9/2022 1958)  Free From Fall Injury: Instruct family/caregiver on patient safety  10/9/2022 1317 by Devora Park RN  Outcome: Progressing     Problem: Skin/Tissue Integrity  Goal: Absence of new skin breakdown  Description: 1. Monitor for areas of redness and/or skin breakdown  2. Assess vascular access sites hourly  3. Every 4-6 hours minimum:  Change oxygen saturation probe site  4. Every 4-6 hours:  If on nasal continuous positive airway pressure, respiratory therapy assess nares and determine need for appliance change or resting period.   10/10/2022 0018 by Steve Maxwell RN  Outcome: Progressing  10/9/2022 1317 by Devora Park RN  Outcome: Progressing     Problem: ABCDS Injury Assessment  Goal: Absence of physical injury  10/10/2022 0018 by Steve Maxwell RN  Outcome: Progressing  Flowsheets (Taken 10/9/2022 1958)  Absence of Physical Injury: Implement safety measures based on patient assessment  10/9/2022 1317 by Devora Park RN  Outcome: Progressing

## 2022-10-10 NOTE — PROGRESS NOTES
Physical Therapy Treatment Note/Plan of Care    Room #:  4598/8456-73  Patient Name: Júnior Veloz  YOB: 1950  MRN: [id number]    Date of Service: 10/10/2022     Tentative placement recommendation: Subacute rehab  Equipment recommendation: To be determined      Evaluating Physical Therapist: Danilo Rivas, PT #[id number]      Specific Provider Orders/Date/Referring Provider :  10/07/22 0645    PT eval and treat  Start:  10/07/22 0645,   End:  10/07/22 0645,   ONE TIME,   Standing Count:  1 Occurrences,   LINH Abbott, APRN - CNP   10/06/22 1515    PT eval and treat  Start:  10/06/22 1515,   End:  10/06/22 1515,   ONE TIME,   Standing Count:  1 Occurrences,   LINH Plummer MD     Admitting Diagnosis:   Sepsis ([facility] [A41.9]     hypoxemia  confused and somnolent. She also noticed that she had a fever.   Surgery: none  Visit Diagnoses         Codes    Septicemia ([facility]    -  Primary A41.9    Urinary tract infection in female     N39.0            Patient Active Problem List   Diagnosis    Depression with anxiety    Osteoporosis    Asthma    Hyperlipidemia    Mitral regurgitation    Obstructive sleep apnea syndrome    Psoriasis    Diabetes mellitus ([address]    Parkinson's disease ([address]    Primary hypertension    Microalbuminuria    Morbid obesity (HCC)    RLS (restless legs syndrome)    Generalized weakness    Inability to walk    Hypothyroidism    Chest pain    Acute asthma exacerbation    Asthma exacerbation, mild    Paroxysmal atrial fibrillation (HCC)    Atrial fibrillation with RVR (HCC)    Acute on chronic congestive heart failure ([address]    Coronary artery disease involving native coronary artery of native heart without angina pectoris    Dysphagia    Hepatosplenomegaly    Acute decompensated heart failure (HCC)    Acute diastolic (congestive) heart failure (HCC)    Nonrheumatic tricuspid valve regurgitation    Tobacco abuse    Recurrent syncope    Septic shock ([address]    Seizure-like activity ([facility]    SHANTANU (acute kidney injury) ([facility]    Pancytopenia (HCC)    Thrombocytopenia (HCC)    Encephalopathy    Acute respiratory failure with hypoxia (HCC)    Lactic acidosis    Delirium    Acute on chronic anemia    Pleural effusion, right    Acute respiratory failure with hypoxia and hypercapnia (HCC)    Acute confusion    Chronic diastolic (congestive) heart failure (HCC)    Macrocytosis    Other specified anemias    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[facility]    Puerperal sepsis with acute hypoxic respiratory failure without septic shock (HCC)    Pulmonary HTN (HCC)    Chronic anticoagulation    RVF (right ventricular failure) (HCC)    Metabolic alkalosis    Sepsis (HCC)        ASSESSMENT of Current Deficits Patient exhibits decreased strength, balance, and endurance impairing functional mobility, transfers, gait , gait distance, and tolerance to activity. Fatigues with exertion. Patient able to increase gait distance; however requires minimal assist and max cues for hand placement. Patient would benefit with continued strengthening and endurance activities to decrease level of assist and return back to the SNF. Patient requires continued skilled physical therapy to address concerns listed above for increased safety and function at discharge.          PHYSICAL THERAPY  PLAN OF CARE       Physical therapy plan of care is established based on physician order,  patient diagnosis and clinical assessment    Current Treatment Recommendations:    -Bed Mobility: Lower extremity exercises , Upper extremity exercises , and Trunk control activities   -Sitting Balance: Incorporate reaching activities to activate trunk muscles , Facilitate active trunk muscle engagement , Facilitate postural control in all planes , and Engage in core activities to allow for movement within base of support   -Standing Balance: Perform strengthening exercises in standing to promote motor control with or without upper extremity support -Transfers: Provide instruction on proper hand and foot position for adequate transfer of weight onto lower extremities and use of gait device if needed and Cues for hand placement, technique and safety. Provide stabilization to prevent fall   -Gait: Gait training, Standing activities to improve: base of support, weight shift, weight bearing , and Pregait training to emphasize: Sequencing , Device control, Upright, and Safety   -Endurance: Utilize Supervised activities to increase level of endurance to allow for safe functional mobility including transfers and gait  and Use graduated activities to promote good breathing techniques and provide support and education to maximize respiratory function    PT long term treatment goals are located in below grid    Patient and or family understand(s) diagnosis, prognosis, and plan of care. Frequency of treatments: Patient will be seen  daily.          Prior Level of Function: Patient ambulated with wheeled walker short distance, transfers to wheelchair  Rehab Potential: good - for baseline    Past medical history:   Past Medical History:   Diagnosis Date    A-fib ([address]     Acute on chronic congestive heart failure ([address]     Anxiety     Asthma     CAD (coronary artery disease) 01/21/2016    Cancer ([address]  breast ca 2006    right lumpectomy    Chronic kidney disease     nephrolithiasis    Depression     Diabetes mellitus ([address]     H/O mammogram     Hx MRSA infection     toe infection january 2012    Hyperlipidemia     Hypertension     Lateral epicondylitis     Morbid obesity ([address]     SERENA on CPAP     Oxygen dependent     Parkinson's disease ([address]     Tubal ligation status      Past Surgical History:   Procedure Laterality Date    BREAST LUMPECTOMY      BREAST REDUCTION SURGERY      CARDIAC CATHETERIZATION  4/28/2014    Dr. Fiona Hoffman  01/11/2022    Dr. Shelly Santamaria  7/29/15    CT GUIDED CHEST TUBE  7/8/2022    CT GUIDED CHEST TUBE 7/8/2022 Lydia Padilla II, MD [facility]    ENDOSCOPY, COLON, DIAGNOSTIC  7/19/15    GALLBLADDER SURGERY      LUMBAR LAMINECTOMY      TOE AMPUTATION      TONSILLECTOMY      UPPER GASTROINTESTINAL ENDOSCOPY      UPPER GASTROINTESTINAL ENDOSCOPY N/A 7/19/2022    EGD ESOPHAGOGASTRODUODENOSCOPY performed by Disha Campbell MD at [address]:    Precautions:    , falls and O2 ,   history of breast cancer    Social history: Patient lives in a skilled nursing facility [address] owned: [employer], [employer],       [facility]   How much difficulty turning over in bed?: A Little  How much difficulty sitting down on / standing up from a chair with arms?: A Little  How much difficulty moving from lying on back to sitting on side of bed?: A Lot  How much help from another person moving to and from a bed to a chair?: A Lot  How much help from another person needed to walk in hospital room?: Total  How much help from another person for climbing 3-5 steps with a railing?: Total  AM-PAC Inpatient Mobility Raw Score : 12  AM-PAC Inpatient T-Scale Score : 35.33  Mobility Inpatient CMS 0-100% Score: 68.66  Mobility Inpatient CMS G-Code Modifier : CL    Nursing cleared patient for PT treatment. OBJECTIVE;   Initial Evaluation  Date: 10/7/2022 Treatment Date:  10/10/2022     Short Term/ Long Term   Goals   Was pt agreeable to Eval/treatment? Yes Yes  To be met in 5 days   Pain level   0/10    0/10    Bed Mobility    Rolling: Not assessed     Supine to sit: Not assessed     Sit to supine: Moderate assist of 1    Scooting: Moderate assist of 1   Rolling: Minimal assist of 1   Supine to sit: Moderate assist of 1   Sit to supine:  Moderate assist of 1   Scooting: Minimal assist of 1    Rolling: Supervision     Supine to sit: Supervision     Sit to supine: Supervision     Scooting: Supervision      Transfers Sit to stand: Minimal assist of 1 Cues for hand placement and safety  Sit to stand: Minimal assist of 1    Sit to stand: Supervision      Ambulation     5 forward/backward steps using  wheeled walker with Minimal assist of 1   for safety and cues for upright posture, safety, and pacing 3 feet using  wheeled walker with Minimal assist of 1   for balance, upright, and safety     20 feet using  wheeled walker with Supervision     Stair negotiation: ascended and descended   Not assessed          ROM Within functional limits    Increase range of motion 10% of affected joints    Strength BUE:  refer to OT eval  RLE:  3+/5  LLE:  3+/5  Increase strength in affected mm groups by 1/3 grade   Balance Sitting EOB:  fair elevated surface  Dynamic Standing:  fair wheeled walker  Sitting EOB: fair plus   Dynamic Standing: fair    Sitting EOB:  good    Dynamic Standing: good -wheeled walker      Patient is Alert & Oriented x person, place, time, and situation and follows directions    Sensation:  Patient  denies numbness/tingling   Edema:  no   Endurance: fair       Vitals:  2.5 liters high flow nasal cannula   Blood Pressure at rest Blood Pressure during session     Heart Rate at rest  Heart Rate during session 75   SPO2 at rest 99%  SPO2 during session 97%     Patient education  Patient educated on role of Physical Therapy, risks of immobility, safety and plan of care,  importance of mobility while in hospital , ankle pumps, quad set and glut set for edema control, blood clot prevention, positioning for skin integrity and comfort, and proper use/technique of incentive spirometer     Patient response to education:   Pt verbalized understanding Pt demonstrated skill Pt requires further education in this area   Yes Partial Yes      Treatment:  Patient practiced and was instructed/facilitated in the following treatment: transferred to edge of bed, Sat edge of bed 10 minutes with Minimal assist of 1 to increase dynamic sitting balance and activity tolerance. Progressed to supervision. Performed seated exercise. Stood ambulated/ steps to head of bed. Stood x 1 again. Returned to bed, performed exercises. Therapeutic Exercises:  ankle pumps, heel raises, heel slide, hip abduction/adduction, straight leg raise, long arc quad, and seated marching  x 12-15 reps. At end of session, patient in bed with alarm call light and phone within reach,  all lines and tubes intact, nursing notified. Patient would benefit from continued skilled Physical Therapy to improve functional independence and quality of life.          Patient's/ family goals   Return to [facility]    Time in  1023  Time out  1046    Total Treatment Time  23 minutes      CPT codes:    Therapeutic activities (43215)   13 minutes  1 unit(s)  Therapeutic exercises (13742)   10 minutes  1 unit(s)    Tami Salas PTA [facility]#[id number]

## 2022-10-11 ENCOUNTER — TELEPHONE (OUTPATIENT)
Dept: OTHER | Age: 72
End: 2022-10-11

## 2022-10-11 LAB — BLOOD CULTURE, ROUTINE: NORMAL

## 2022-10-11 NOTE — TELEPHONE ENCOUNTER
CHF CHW Initial Call  10/11/2022  12:30 PM    CHW received referral from Kelvin Wheat, LORNA. Patient need: Transportation  Notes: CHW called [employer] [facility] [place] ([phone], Flaco Godinez) to confirm transportation has been arranged for future appointment(s). Nurse confirmed transportation will be made for future appointment(s). Nurse had a question regarding fluid restrictions. CHW provided CHF clinic phone number ([phone]) and instructed nurse to call the clinic.     Future Appointments   Date Time Provider Prateek Nassar   10/18/2022  2:00 PM [facility] ROOM 1 [facility] [address] [facility]   11/16/2022  2:30 PM [facility] [address], MD [address] [place] [address]     Electronically signed by Don Peacock on 10/11/2022 at 12:34 PM

## 2022-10-12 ENCOUNTER — HOSPITAL ENCOUNTER (INPATIENT)
Age: 72
LOS: 10 days | Discharge: NURSING, REHAB OR LONG-TERM CARE | DRG: 291 | End: 2022-10-22
Attending: EMERGENCY MEDICINE | Admitting: FAMILY MEDICINE
Payer: MEDICARE

## 2022-10-12 ENCOUNTER — TELEPHONE (OUTPATIENT)
Dept: OTHER | Facility: CLINIC | Age: 72
End: 2022-10-12

## 2022-10-12 ENCOUNTER — APPOINTMENT (OUTPATIENT)
Dept: GENERAL RADIOLOGY | Age: 72
DRG: 291 | End: 2022-10-12
Payer: MEDICARE

## 2022-10-12 DIAGNOSIS — I50.9 ACUTE ON CHRONIC CONGESTIVE HEART FAILURE, UNSPECIFIED HEART FAILURE TYPE (HCC): ICD-10-CM

## 2022-10-12 DIAGNOSIS — J95.811 POSTPROCEDURAL PNEUMOTHORAX: ICD-10-CM

## 2022-10-12 DIAGNOSIS — J96.22 ACUTE ON CHRONIC RESPIRATORY FAILURE WITH HYPERCAPNIA (HCC): Primary | ICD-10-CM

## 2022-10-12 DIAGNOSIS — J44.1 COPD EXACERBATION (HCC): ICD-10-CM

## 2022-10-12 LAB
ANION GAP SERPL CALCULATED.3IONS-SCNC: 7 MMOL/L (ref 7–16)
B.E.: 7.9 MMOL/L (ref -3–3)
B.E.: 9.7 MMOL/L (ref -3–3)
BASOPHILS ABSOLUTE: 0.04 E9/L (ref 0–0.2)
BASOPHILS RELATIVE PERCENT: 0.5 % (ref 0–2)
BUN BLDV-MCNC: 19 MG/DL (ref 6–23)
CALCIUM SERPL-MCNC: 9.2 MG/DL (ref 8.6–10.2)
CHLORIDE BLD-SCNC: 93 MMOL/L (ref 98–107)
CO2: 39 MMOL/L (ref 22–29)
CREAT SERPL-MCNC: 0.9 MG/DL (ref 0.5–1)
CRITICAL NOTIFICATION: YES
DELIVERY SYSTEMS: ABNORMAL
DELIVERY SYSTEMS: ABNORMAL
DEVICE: ABNORMAL
DEVICE: ABNORMAL
EOSINOPHILS ABSOLUTE: 0.1 E9/L (ref 0.05–0.5)
EOSINOPHILS RELATIVE PERCENT: 1.2 % (ref 0–6)
FIO2: 25
GFR AFRICAN AMERICAN: >60
GFR NON-AFRICAN AMERICAN: >60 ML/MIN/1.73
GLUCOSE BLD-MCNC: 207 MG/DL (ref 74–99)
HCO3: 34.2 MMOL/L (ref 22–26)
HCO3: 39.3 MMOL/L (ref 22–26)
HCT VFR BLD CALC: 36.2 % (ref 34–48)
HEMOGLOBIN: 10.6 G/DL (ref 11.5–15.5)
IMMATURE GRANULOCYTES #: 0.14 E9/L
IMMATURE GRANULOCYTES %: 1.7 % (ref 0–5)
LACTIC ACID, SEPSIS: 1 MMOL/L (ref 0.5–1.9)
LYMPHOCYTES ABSOLUTE: 1.3 E9/L (ref 1.5–4)
LYMPHOCYTES RELATIVE PERCENT: 15.8 % (ref 20–42)
MCH RBC QN AUTO: 26.8 PG (ref 26–35)
MCHC RBC AUTO-ENTMCNC: 29.3 % (ref 32–34.5)
MCV RBC AUTO: 91.6 FL (ref 80–99.9)
METER GLUCOSE: 242 MG/DL (ref 74–99)
MODE: ABNORMAL
MONOCYTES ABSOLUTE: 0.41 E9/L (ref 0.1–0.95)
MONOCYTES RELATIVE PERCENT: 5 % (ref 2–12)
NEUTROPHILS ABSOLUTE: 6.24 E9/L (ref 1.8–7.3)
NEUTROPHILS RELATIVE PERCENT: 75.8 % (ref 43–80)
O2 SATURATION: 88.2 % (ref 92–98.5)
O2 SATURATION: 95.3 % (ref 92–98.5)
OPERATOR ID: 7291
OPERATOR ID: 9689
PATIENT TEMP: 37
PATIENT TEMP: 37
PCO2 (TEMP CORRECTED): 57.1 MMHG (ref 35–45)
PCO2 (TEMP CORRECTED): 85.6 MMHG (ref 35–45)
PDW BLD-RTO: 15.2 FL (ref 11.5–15)
PH (TEMPERATURE CORRECTED): 7.27 (ref 7.35–7.45)
PH (TEMPERATURE CORRECTED): 7.39 (ref 7.35–7.45)
PLATELET # BLD: 282 E9/L (ref 130–450)
PMV BLD AUTO: 10.9 FL (ref 7–12)
PO2 (TEMP CORRECTED): 57.4 MMHG (ref 60–80)
PO2 (TEMP CORRECTED): 93.2 MMHG (ref 60–80)
POC SOURCE: ABNORMAL
POC SOURCE: ABNORMAL
POSITIVE END EXP PRESS: 6 CMH2O
POTASSIUM REFLEX MAGNESIUM: 4.3 MMOL/L (ref 3.5–5)
PRESSURE SUPPORT: 22 CMH2O
PRO-BNP: 7115 PG/ML (ref 0–125)
RBC # BLD: 3.95 E12/L (ref 3.5–5.5)
SARS-COV-2, NAAT: NOT DETECTED
SODIUM BLD-SCNC: 139 MMOL/L (ref 132–146)
TROPONIN, HIGH SENSITIVITY: 34 NG/L (ref 0–9)
WBC # BLD: 8.2 E9/L (ref 4.5–11.5)

## 2022-10-12 PROCEDURE — 71046 X-RAY EXAM CHEST 2 VIEWS: CPT

## 2022-10-12 PROCEDURE — 6370000000 HC RX 637 (ALT 250 FOR IP): Performed by: FAMILY MEDICINE

## 2022-10-12 PROCEDURE — 94660 CPAP INITIATION&MGMT: CPT

## 2022-10-12 PROCEDURE — 85025 COMPLETE CBC W/AUTO DIFF WBC: CPT

## 2022-10-12 PROCEDURE — 2700000000 HC OXYGEN THERAPY PER DAY

## 2022-10-12 PROCEDURE — 83880 ASSAY OF NATRIURETIC PEPTIDE: CPT

## 2022-10-12 PROCEDURE — 6360000002 HC RX W HCPCS: Performed by: FAMILY MEDICINE

## 2022-10-12 PROCEDURE — 80048 BASIC METABOLIC PNL TOTAL CA: CPT

## 2022-10-12 PROCEDURE — 87040 BLOOD CULTURE FOR BACTERIA: CPT

## 2022-10-12 PROCEDURE — 6360000002 HC RX W HCPCS: Performed by: EMERGENCY MEDICINE

## 2022-10-12 PROCEDURE — 96374 THER/PROPH/DIAG INJ IV PUSH: CPT

## 2022-10-12 PROCEDURE — 99285 EMERGENCY DEPT VISIT HI MDM: CPT

## 2022-10-12 PROCEDURE — 82803 BLOOD GASES ANY COMBINATION: CPT

## 2022-10-12 PROCEDURE — 94640 AIRWAY INHALATION TREATMENT: CPT

## 2022-10-12 PROCEDURE — 93005 ELECTROCARDIOGRAM TRACING: CPT | Performed by: EMERGENCY MEDICINE

## 2022-10-12 PROCEDURE — 84484 ASSAY OF TROPONIN QUANT: CPT

## 2022-10-12 PROCEDURE — 2060000000 HC ICU INTERMEDIATE R&B

## 2022-10-12 PROCEDURE — 96375 TX/PRO/DX INJ NEW DRUG ADDON: CPT

## 2022-10-12 PROCEDURE — 83605 ASSAY OF LACTIC ACID: CPT

## 2022-10-12 PROCEDURE — 82962 GLUCOSE BLOOD TEST: CPT

## 2022-10-12 PROCEDURE — 87635 SARS-COV-2 COVID-19 AMP PRB: CPT

## 2022-10-12 PROCEDURE — 36600 WITHDRAWAL OF ARTERIAL BLOOD: CPT

## 2022-10-12 RX ORDER — ACETAMINOPHEN 325 MG/1
650 TABLET ORAL EVERY 4 HOURS PRN
Status: DISCONTINUED | OUTPATIENT
Start: 2022-10-12 | End: 2022-10-12 | Stop reason: SDUPTHER

## 2022-10-12 RX ORDER — BUDESONIDE AND FORMOTEROL FUMARATE DIHYDRATE 160; 4.5 UG/1; UG/1
2 AEROSOL RESPIRATORY (INHALATION) 2 TIMES DAILY
Status: DISCONTINUED | OUTPATIENT
Start: 2022-10-12 | End: 2022-10-12 | Stop reason: CLARIF

## 2022-10-12 RX ORDER — INSULIN LISPRO 100 [IU]/ML
0-4 INJECTION, SOLUTION INTRAVENOUS; SUBCUTANEOUS NIGHTLY
Status: DISCONTINUED | OUTPATIENT
Start: 2022-10-12 | End: 2022-10-22 | Stop reason: HOSPADM

## 2022-10-12 RX ORDER — POLYETHYLENE GLYCOL 3350 17 G/17G
17 POWDER, FOR SOLUTION ORAL DAILY PRN
Status: DISCONTINUED | OUTPATIENT
Start: 2022-10-12 | End: 2022-10-12 | Stop reason: CLARIF

## 2022-10-12 RX ORDER — METOPROLOL SUCCINATE 50 MG/1
100 TABLET, EXTENDED RELEASE ORAL 2 TIMES DAILY
Status: DISCONTINUED | OUTPATIENT
Start: 2022-10-12 | End: 2022-10-22 | Stop reason: HOSPADM

## 2022-10-12 RX ORDER — SUCRALFATE 1 G/1
1 TABLET ORAL 4 TIMES DAILY
Status: DISCONTINUED | OUTPATIENT
Start: 2022-10-12 | End: 2022-10-22 | Stop reason: HOSPADM

## 2022-10-12 RX ORDER — CARBIDOPA AND LEVODOPA 25; 100 MG/1; MG/1
2 TABLET, EXTENDED RELEASE ORAL 3 TIMES DAILY
Status: DISCONTINUED | OUTPATIENT
Start: 2022-10-12 | End: 2022-10-22 | Stop reason: HOSPADM

## 2022-10-12 RX ORDER — ASPIRIN 81 MG/1
81 TABLET ORAL DAILY
Status: DISCONTINUED | OUTPATIENT
Start: 2022-10-13 | End: 2022-10-13

## 2022-10-12 RX ORDER — ONDANSETRON 2 MG/ML
4 INJECTION INTRAMUSCULAR; INTRAVENOUS EVERY 6 HOURS PRN
Status: DISCONTINUED | OUTPATIENT
Start: 2022-10-12 | End: 2022-10-22 | Stop reason: HOSPADM

## 2022-10-12 RX ORDER — ARFORMOTEROL TARTRATE 15 UG/2ML
15 SOLUTION RESPIRATORY (INHALATION) 2 TIMES DAILY
Status: DISCONTINUED | OUTPATIENT
Start: 2022-10-12 | End: 2022-10-17

## 2022-10-12 RX ORDER — SODIUM CHLORIDE 0.9 % (FLUSH) 0.9 %
5-40 SYRINGE (ML) INJECTION PRN
Status: DISCONTINUED | OUTPATIENT
Start: 2022-10-12 | End: 2022-10-22 | Stop reason: HOSPADM

## 2022-10-12 RX ORDER — MONTELUKAST SODIUM 10 MG/1
10 TABLET ORAL NIGHTLY
Status: DISCONTINUED | OUTPATIENT
Start: 2022-10-12 | End: 2022-10-22 | Stop reason: HOSPADM

## 2022-10-12 RX ORDER — BUDESONIDE 0.5 MG/2ML
0.5 INHALANT ORAL 2 TIMES DAILY
Status: DISCONTINUED | OUTPATIENT
Start: 2022-10-12 | End: 2022-10-12

## 2022-10-12 RX ORDER — PREDNISONE 20 MG/1
40 TABLET ORAL DAILY
Status: DISCONTINUED | OUTPATIENT
Start: 2022-10-15 | End: 2022-10-13

## 2022-10-12 RX ORDER — LORAZEPAM 2 MG/ML
0.5 INJECTION INTRAMUSCULAR ONCE
Status: COMPLETED | OUTPATIENT
Start: 2022-10-12 | End: 2022-10-12

## 2022-10-12 RX ORDER — MIRTAZAPINE 15 MG/1
15 TABLET, ORALLY DISINTEGRATING ORAL NIGHTLY
Status: DISCONTINUED | OUTPATIENT
Start: 2022-10-12 | End: 2022-10-22 | Stop reason: HOSPADM

## 2022-10-12 RX ORDER — ENOXAPARIN SODIUM 100 MG/ML
40 INJECTION SUBCUTANEOUS DAILY
Status: DISCONTINUED | OUTPATIENT
Start: 2022-10-13 | End: 2022-10-12 | Stop reason: SDUPTHER

## 2022-10-12 RX ORDER — ATORVASTATIN CALCIUM 20 MG/1
20 TABLET, FILM COATED ORAL NIGHTLY
Status: DISCONTINUED | OUTPATIENT
Start: 2022-10-12 | End: 2022-10-13

## 2022-10-12 RX ORDER — PANTOPRAZOLE SODIUM 40 MG/1
40 TABLET, DELAYED RELEASE ORAL EVERY MORNING
Status: DISCONTINUED | OUTPATIENT
Start: 2022-10-13 | End: 2022-10-22 | Stop reason: HOSPADM

## 2022-10-12 RX ORDER — PROMETHAZINE HYDROCHLORIDE 25 MG/1
25 TABLET ORAL EVERY 6 HOURS PRN
Status: DISCONTINUED | OUTPATIENT
Start: 2022-10-12 | End: 2022-10-22 | Stop reason: HOSPADM

## 2022-10-12 RX ORDER — IPRATROPIUM BROMIDE AND ALBUTEROL SULFATE 2.5; .5 MG/3ML; MG/3ML
1 SOLUTION RESPIRATORY (INHALATION)
Status: DISCONTINUED | OUTPATIENT
Start: 2022-10-12 | End: 2022-10-12

## 2022-10-12 RX ORDER — LORAZEPAM 0.5 MG/1
0.5 TABLET ORAL EVERY 8 HOURS PRN
Status: DISCONTINUED | OUTPATIENT
Start: 2022-10-12 | End: 2022-10-22 | Stop reason: HOSPADM

## 2022-10-12 RX ORDER — ACETAMINOPHEN 650 MG/1
650 SUPPOSITORY RECTAL EVERY 6 HOURS PRN
Status: DISCONTINUED | OUTPATIENT
Start: 2022-10-12 | End: 2022-10-22 | Stop reason: HOSPADM

## 2022-10-12 RX ORDER — POLYETHYLENE GLYCOL 3350 17 G/17G
17 POWDER, FOR SOLUTION ORAL DAILY PRN
Status: DISCONTINUED | OUTPATIENT
Start: 2022-10-12 | End: 2022-10-22 | Stop reason: HOSPADM

## 2022-10-12 RX ORDER — BUMETANIDE 0.25 MG/ML
1 INJECTION, SOLUTION INTRAMUSCULAR; INTRAVENOUS 2 TIMES DAILY
Status: DISCONTINUED | OUTPATIENT
Start: 2022-10-12 | End: 2022-10-14

## 2022-10-12 RX ORDER — INSULIN GLARGINE 100 [IU]/ML
13 INJECTION, SOLUTION SUBCUTANEOUS 2 TIMES DAILY
Status: DISCONTINUED | OUTPATIENT
Start: 2022-10-12 | End: 2022-10-21

## 2022-10-12 RX ORDER — FUROSEMIDE 10 MG/ML
40 INJECTION INTRAMUSCULAR; INTRAVENOUS ONCE
Status: COMPLETED | OUTPATIENT
Start: 2022-10-12 | End: 2022-10-12

## 2022-10-12 RX ORDER — SODIUM CHLORIDE 9 MG/ML
INJECTION, SOLUTION INTRAVENOUS PRN
Status: DISCONTINUED | OUTPATIENT
Start: 2022-10-12 | End: 2022-10-22 | Stop reason: HOSPADM

## 2022-10-12 RX ORDER — INSULIN LISPRO 100 [IU]/ML
0-16 INJECTION, SOLUTION INTRAVENOUS; SUBCUTANEOUS
Status: DISCONTINUED | OUTPATIENT
Start: 2022-10-13 | End: 2022-10-22 | Stop reason: HOSPADM

## 2022-10-12 RX ORDER — ACETAMINOPHEN 325 MG/1
650 TABLET ORAL EVERY 6 HOURS PRN
Status: DISCONTINUED | OUTPATIENT
Start: 2022-10-12 | End: 2022-10-22 | Stop reason: HOSPADM

## 2022-10-12 RX ORDER — LOPERAMIDE HYDROCHLORIDE 2 MG/1
2 CAPSULE ORAL 4 TIMES DAILY PRN
Status: DISCONTINUED | OUTPATIENT
Start: 2022-10-12 | End: 2022-10-22 | Stop reason: HOSPADM

## 2022-10-12 RX ORDER — METHYLPREDNISOLONE SODIUM SUCCINATE 40 MG/ML
40 INJECTION, POWDER, LYOPHILIZED, FOR SOLUTION INTRAMUSCULAR; INTRAVENOUS EVERY 6 HOURS
Status: DISCONTINUED | OUTPATIENT
Start: 2022-10-12 | End: 2022-10-13

## 2022-10-12 RX ORDER — SODIUM CHLORIDE 0.9 % (FLUSH) 0.9 %
5-40 SYRINGE (ML) INJECTION EVERY 12 HOURS SCHEDULED
Status: DISCONTINUED | OUTPATIENT
Start: 2022-10-12 | End: 2022-10-22 | Stop reason: HOSPADM

## 2022-10-12 RX ORDER — ONDANSETRON 4 MG/1
4 TABLET, ORALLY DISINTEGRATING ORAL EVERY 8 HOURS PRN
Status: DISCONTINUED | OUTPATIENT
Start: 2022-10-12 | End: 2022-10-22 | Stop reason: HOSPADM

## 2022-10-12 RX ORDER — IPRATROPIUM BROMIDE AND ALBUTEROL SULFATE 2.5; .5 MG/3ML; MG/3ML
1 SOLUTION RESPIRATORY (INHALATION) EVERY 4 HOURS PRN
Status: DISCONTINUED | OUTPATIENT
Start: 2022-10-12 | End: 2022-10-15

## 2022-10-12 RX ORDER — ROPINIROLE 1 MG/1
1 TABLET, FILM COATED ORAL 3 TIMES DAILY
Status: DISCONTINUED | OUTPATIENT
Start: 2022-10-12 | End: 2022-10-22 | Stop reason: HOSPADM

## 2022-10-12 RX ORDER — DOCUSATE SODIUM 100 MG/1
100 CAPSULE, LIQUID FILLED ORAL 2 TIMES DAILY PRN
Status: DISCONTINUED | OUTPATIENT
Start: 2022-10-12 | End: 2022-10-22 | Stop reason: HOSPADM

## 2022-10-12 RX ORDER — IPRATROPIUM BROMIDE AND ALBUTEROL SULFATE 2.5; .5 MG/3ML; MG/3ML
1 SOLUTION RESPIRATORY (INHALATION) EVERY 4 HOURS
Status: DISCONTINUED | OUTPATIENT
Start: 2022-10-13 | End: 2022-10-22 | Stop reason: HOSPADM

## 2022-10-12 RX ADMIN — IPRATROPIUM BROMIDE AND ALBUTEROL SULFATE 1 AMPULE: .5; 3 SOLUTION RESPIRATORY (INHALATION) at 21:44

## 2022-10-12 RX ADMIN — MIRTAZAPINE 15 MG: 15 TABLET, ORALLY DISINTEGRATING ORAL at 22:07

## 2022-10-12 RX ADMIN — INSULIN GLARGINE 13 UNITS: 100 INJECTION, SOLUTION SUBCUTANEOUS at 22:16

## 2022-10-12 RX ADMIN — ATORVASTATIN CALCIUM 20 MG: 20 TABLET, FILM COATED ORAL at 22:07

## 2022-10-12 RX ADMIN — FUROSEMIDE 40 MG: 10 INJECTION, SOLUTION INTRAMUSCULAR; INTRAVENOUS at 18:59

## 2022-10-12 RX ADMIN — BUDESONIDE 500 MCG: 0.5 SUSPENSION RESPIRATORY (INHALATION) at 21:44

## 2022-10-12 RX ADMIN — APIXABAN 5 MG: 5 TABLET, FILM COATED ORAL at 22:07

## 2022-10-12 RX ADMIN — ARFORMOTEROL TARTRATE 15 MCG: 15 SOLUTION RESPIRATORY (INHALATION) at 21:45

## 2022-10-12 RX ADMIN — LORAZEPAM 0.5 MG: 2 INJECTION INTRAMUSCULAR; INTRAVENOUS at 20:56

## 2022-10-12 ASSESSMENT — ENCOUNTER SYMPTOMS
VOMITING: 1
CHEST TIGHTNESS: 0
COUGH: 1
SHORTNESS OF BREATH: 1
DIARRHEA: 0
BACK PAIN: 0
ABDOMINAL PAIN: 0
BLOOD IN STOOL: 0
RHINORRHEA: 0
NAUSEA: 1
COLOR CHANGE: 0

## 2022-10-12 NOTE — TELEPHONE ENCOUNTER
Writer contacted Dr. Clarke Murphy to inform of 30 day readmission risk. Dr. Clarke Murphy informed Justin Montemayor of intention to discharge, if labs results are good, and recommended follow up with primary care physician some time next week.

## 2022-10-12 NOTE — ED PROVIDER NOTES
Patient to the ED for evaluation of shortness of breath. Patient states that her symptoms started this morning. She has noted that the shortness of breath is constant. Present with exertion and with rest.  No associated fever or chills. She has a cough that is nonproductive. States that its not unusual for her. No associated chest pain. She did have an episode of nausea and vomiting this morning but that has since resolved. No associated abdominal pain or diarrhea. No blood in stool or urine. No blood in the emesis. States that she is not aware of any sick contacts. No associated dizziness or lightheadedness. Symptoms moderate in severity and have been persistent. Gradually worsening. She does report history of congestive heart failure and states that she is on diuretic therapy. Urinating normally. Reports that she does wear supplemental oxygen at the nursing home. She is not sure how much oxygen she uses. Review of Systems   Constitutional:  Positive for fatigue. Negative for chills, diaphoresis and fever. HENT:  Negative for congestion and rhinorrhea. Eyes:  Negative for visual disturbance. Respiratory:  Positive for cough and shortness of breath. Negative for chest tightness. Cardiovascular:  Positive for leg swelling (chronic). Negative for chest pain and palpitations. Gastrointestinal:  Positive for nausea and vomiting. Negative for abdominal pain, blood in stool and diarrhea. Genitourinary:  Negative for decreased urine volume, difficulty urinating and frequency. Musculoskeletal:  Negative for arthralgias, back pain and myalgias. Skin:  Negative for color change and pallor. Neurological:  Negative for dizziness, syncope and light-headedness. Hematological:  Bruises/bleeds easily (on eliquis). All other systems reviewed and are negative. Physical Exam  Vitals and nursing note reviewed. Constitutional:       General: She is not in acute distress. Appearance: She is well-developed. She is obese. She is not ill-appearing or diaphoretic. HENT:      Head: Normocephalic and atraumatic. Eyes:      Conjunctiva/sclera: Conjunctivae normal.   Cardiovascular:      Rate and Rhythm: Normal rate and regular rhythm. Heart sounds: Normal heart sounds. No murmur heard. Pulmonary:      Effort: Pulmonary effort is normal. No respiratory distress (No accessory muscle use, conversational dyspnea, or tachypnea). Breath sounds: Examination of the right-lower field reveals rales. Examination of the left-lower field reveals rales. Decreased breath sounds and rales present. No wheezing or rhonchi. Abdominal:      General: Bowel sounds are normal.      Palpations: Abdomen is soft. Tenderness: There is no abdominal tenderness. There is no guarding or rebound. Musculoskeletal:      Cervical back: Normal range of motion and neck supple. Comments: 1+ pitting edema bilateral lower extremities. No erythema or calf tenderness. No increased warmth to palpation. Skin:     General: Skin is warm and dry. Coloration: Skin is not pale. Neurological:      Mental Status: She is alert and oriented to person, place, and time. Coordination: Coordination normal.        Procedures     MDM   Patient presented to the ED for evaluation of shortness of breath. On exam patient appeared to be in no distress and was not hypoxic on her supplemental oxygen. Labs and imaging were assessed. CBC showed no evidence of leukocytosis or anemia. BMP showed no electrolyte abnormalities or evidence of renal insufficiency. Patient's troponin was 34 which is near her baseline. proBNP was elevated at 7115. Chest x-ray also showed evidence of pulmonary edema and patient was given IV Lasix. Patient's daughter was present at bedside during the work-up and said that she had also been recently admitted for hypercapnic respiratory failure.   At this time an ABG was assessed which did show an elevated PCO2 of 85.6 and a pH of 7.27. Patient placed on BiPAP and is going be admitted for further treatment evaluation. EKG Interpretation    Interpreted by emergency department physician    Rhythm: atrial fibrillation - controlled  Rate: normal  Axis: normal  Ectopy: none  Conduction: normal  ST Segments: no acute change  T Waves: no acute change  Q Waves: none    Clinical Impression: atrial fibrillation (chronic)    Sureshjannet SylviaDO      ED Course as of 10/12/22 2020   Wed Oct 12, 2022   1852 Resting in bed in no distress. In the upper 90s on pulse ox. Discussed results of labs and imaging with patient and daughter present. Daughter is concerned because patient has been here recently and was discharged home and then later came back in hypercapnic respiratory failure. We will repeat ABG and have patient ambulate to see how she tolerates this. Patient's chest x-ray did show pulmonary edema and she will be given Lasix in the ED. [MS]   2001 Patient's PCO2 is 85 with a pH of 7.27. Will place on BiPAP and reassess. Patient is agreeable to admission for further treatment evaluation. [MS]   2015 Requesting Ativan while on BiPAP. [MS]      ED Course User Index  [MS] Sureshjannet Sylvia        --------------------------------------------- PAST HISTORY ---------------------------------------------  Past Medical History:  has a past medical history of A-fib ([address] Acute on chronic congestive heart failure ([address] Anxiety, Asthma, CAD (coronary artery disease), Cancer ([address] Chronic kidney disease, Depression, Diabetes mellitus ([address] H/O mammogram, Hx MRSA infection, Hyperlipidemia, Hypertension, Lateral epicondylitis, Morbid obesity ([address] SERENA on CPAP, Oxygen dependent, Parkinson's disease ([address] and Tubal ligation status. Past Surgical History:  has a past surgical history that includes Gallbladder surgery; Toe amputation; Cholecystectomy; Colonoscopy (7/29/15);  Endoscopy, colon, diagnostic (7/19/15); Upper gastrointestinal endoscopy; lumbar laminectomy; Tonsillectomy; Breast lumpectomy; Breast reduction surgery; Cardiac catheterization (4/28/2014); Cardiac catheterization (01/11/2022); CT GUIDED CHEST TUBE (7/8/2022); and Upper gastrointestinal endoscopy (N/A, 7/19/2022). Social History:  reports that she has never smoked. She has never used smokeless tobacco. She reports that she does not drink alcohol and does not use drugs. Family History: family history includes Cancer in her father and mother; Diabetes in her sister; Heart Disease in her sister; Other in her brother; Seizures in her son. The patients home medications have been reviewed.     Allergies: Ciprofloxacin and Codeine    -------------------------------------------------- RESULTS -------------------------------------------------    LABS:  Results for orders placed or performed during the hospital encounter of 10/12/22   COVID-19, Rapid    Specimen: Nasopharyngeal Swab   Result Value Ref Range    SARS-CoV-2, NAAT Not Detected Not Detected   CBC with Auto Differential   Result Value Ref Range    WBC 8.2 4.5 - 11.5 E9/L    RBC 3.95 3.50 - 5.50 E12/L    Hemoglobin 10.6 (L) 11.5 - 15.5 g/dL    Hematocrit 36.2 34.0 - 48.0 %    MCV 91.6 80.0 - 99.9 fL    MCH 26.8 26.0 - 35.0 pg    MCHC 29.3 (L) 32.0 - 34.5 %    RDW 15.2 (H) 11.5 - 15.0 fL    Platelets 185 871 - 084 E9/L    MPV 10.9 7.0 - 12.0 fL    Neutrophils % 75.8 43.0 - 80.0 %    Immature Granulocytes % 1.7 0.0 - 5.0 %    Lymphocytes % 15.8 (L) 20.0 - 42.0 %    Monocytes % 5.0 2.0 - 12.0 %    Eosinophils % 1.2 0.0 - 6.0 %    Basophils % 0.5 0.0 - 2.0 %    Neutrophils Absolute 6.24 1.80 - 7.30 E9/L    Immature Granulocytes # 0.14 E9/L    Lymphocytes Absolute 1.30 (L) 1.50 - 4.00 E9/L    Monocytes Absolute 0.41 0.10 - 0.95 E9/L    Eosinophils Absolute 0.10 0.05 - 0.50 E9/L    Basophils Absolute 0.04 0.00 - 0.20 A0/E   Basic Metabolic Panel w/ Reflex to MG   Result Value Ref Range Sodium 139 132 - 146 mmol/L    Potassium reflex Magnesium 4.3 3.5 - 5.0 mmol/L    Chloride 93 (L) 98 - 107 mmol/L    CO2 39 (H) 22 - 29 mmol/L    Anion Gap 7 7 - 16 mmol/L    Glucose 207 (H) 74 - 99 mg/dL    BUN 19 6 - 23 mg/dL    Creatinine 0.9 0.5 - 1.0 mg/dL    GFR Non-African American >60 >=60 mL/min/1.73    GFR African American >60     Calcium 9.2 8.6 - 10.2 mg/dL   Troponin   Result Value Ref Range    Troponin, High Sensitivity 34 (H) 0 - 9 ng/L   Brain Natriuretic Peptide   Result Value Ref Range    Pro-BNP 7,115 (H) 0 - 125 pg/mL   Lactate, Sepsis   Result Value Ref Range    Lactic Acid, Sepsis 1.0 0.5 - 1.9 mmol/L   Arterial Blood Gas, Respiratory Only   Result Value Ref Range    POC Source Arterial     Pt Temp 37.0     PH (TEMPERATURE CORRECTED) 7.270 (L) 7.350 - 7.450    PCO2 (TEMP CORRECTED) 85.6 (HH) 35.0 - 45.0 mmHg    PO2 (TEMP CORRECTED) 93.2 (H) 60.0 - 80.0 mmHg    HCO3 39.3 (H) 22.0 - 26.0 mmol/L    B.E. 9.7 (H) -3.0 - 3.0 mmol/L    O2 Sat 95.3 92.0 - 98.5 %     ID 7,291     DEVICE 17,310,521,400,678     Critical Notification Yes     Delivery Systems Cannula    EKG 12 Lead   Result Value Ref Range    Ventricular Rate 74 BPM    Atrial Rate 65 BPM    QRS Duration 70 ms    Q-T Interval 374 ms    QTc Calculation (Bazett) 415 ms    R Axis 71 degrees    T Axis 25 degrees       RADIOLOGY:  XR CHEST (2 VW)   Final Result   Cardiomegaly with persistent interstitial and alveolar pulmonary edema which   is most pronounced on the right.                   ------------------------- NURSING NOTES AND VITALS REVIEWED ---------------------------  Date / Time Roomed:  10/12/2022  4:06 PM  ED Bed Assignment:  20/20    The nursing notes within the ED encounter and vital signs as below have been reviewed.      Patient Vitals for the past 24 hrs:   BP Temp Temp src Pulse Resp SpO2 Height Weight   10/12/22 1827 (!) 108/90 -- -- 81 17 98 % -- --   10/12/22 1617 107/77 98 °F (36.7 °C) Oral 81 18 98 % -- -- 10/12/22 1612 -- -- -- -- -- -- 5' (1.524 m) 211 lb (95.7 kg)       Oxygen Saturation Interpretation: Normal    ------------------------------------------ PROGRESS NOTES ------------------------------------------  Re-evaluation(s):  Refer to ED course above. Counseling:  I have spoken with the patient and discussed todays results, in addition to providing specific details for the plan of care and counseling regarding the diagnosis and prognosis. Their questions are answered at this time and they are agreeable with the plan of admission. Critical care:  Please note that the withdrawal or failure to initiate urgent interventions for this patient would likely result in a life threatening deterioration or permanent disability. Systems at risk for deterioration include: Hypercapnic respiratory failure requiring BiPAP therapy in the ED    Accordingly this patient received 36 minutes of critical care time, excluding separately billable procedures. --------------------------------- ADDITIONAL PROVIDER NOTES ---------------------------------  Consultations:  Time: 2014. Spoke with Dr. Breezy Cabrales. Discussed case. He will admit the patient. This patient's ED course included: a personal history and physicial examination, re-evaluation prior to disposition, multiple bedside re-evaluations, IV medications, cardiac monitoring, continuous pulse oximetry, and complex medical decision making and emergency management    This patient has remained hemodynamically stable during their ED course. Diagnosis:  1. Acute on chronic respiratory failure with hypercapnia (HCC)    2. COPD exacerbation ([facility]    3. Acute on chronic congestive heart failure, unspecified heart failure type ([facility]        Disposition:  Patient's disposition: Admit to [facility]  Patient's condition is fair.          Salvador Martinez DO  10/12/22 2020

## 2022-10-13 ENCOUNTER — APPOINTMENT (OUTPATIENT)
Dept: CT IMAGING | Age: 72
DRG: 291 | End: 2022-10-13
Payer: MEDICARE

## 2022-10-13 PROBLEM — J96.22 ACUTE ON CHRONIC RESPIRATORY FAILURE WITH HYPERCAPNIA (HCC): Status: ACTIVE | Noted: 2022-01-01

## 2022-10-13 PROBLEM — I48.19 PERSISTENT ATRIAL FIBRILLATION (HCC): Status: ACTIVE | Noted: 2022-10-13

## 2022-10-13 LAB
ALBUMIN SERPL-MCNC: 3.1 G/DL (ref 3.5–5.2)
ALP BLD-CCNC: 87 U/L (ref 35–104)
ALT SERPL-CCNC: <5 U/L (ref 0–32)
ANION GAP SERPL CALCULATED.3IONS-SCNC: 10 MMOL/L (ref 7–16)
AST SERPL-CCNC: 12 U/L (ref 0–31)
BASOPHILS ABSOLUTE: 0.03 E9/L (ref 0–0.2)
BASOPHILS RELATIVE PERCENT: 0.6 % (ref 0–2)
BILIRUB SERPL-MCNC: 0.3 MG/DL (ref 0–1.2)
BUN BLDV-MCNC: 19 MG/DL (ref 6–23)
CALCIUM SERPL-MCNC: 8.1 MG/DL (ref 8.6–10.2)
CHLORIDE BLD-SCNC: 99 MMOL/L (ref 98–107)
CO2: 32 MMOL/L (ref 22–29)
CREAT SERPL-MCNC: 0.9 MG/DL (ref 0.5–1)
EOSINOPHILS ABSOLUTE: 0.05 E9/L (ref 0.05–0.5)
EOSINOPHILS RELATIVE PERCENT: 0.9 % (ref 0–6)
GFR AFRICAN AMERICAN: >60
GFR NON-AFRICAN AMERICAN: >60 ML/MIN/1.73
GLUCOSE BLD-MCNC: 215 MG/DL (ref 74–99)
HCT VFR BLD CALC: 34.3 % (ref 34–48)
HEMOGLOBIN: 10 G/DL (ref 11.5–15.5)
IMMATURE GRANULOCYTES #: 0.12 E9/L
IMMATURE GRANULOCYTES %: 2.2 % (ref 0–5)
LACTIC ACID, SEPSIS: 1.5 MMOL/L (ref 0.5–1.9)
LYMPHOCYTES ABSOLUTE: 0.8 E9/L (ref 1.5–4)
LYMPHOCYTES RELATIVE PERCENT: 14.8 % (ref 20–42)
MCH RBC QN AUTO: 26.5 PG (ref 26–35)
MCHC RBC AUTO-ENTMCNC: 29.2 % (ref 32–34.5)
MCV RBC AUTO: 91 FL (ref 80–99.9)
METER GLUCOSE: 193 MG/DL (ref 74–99)
METER GLUCOSE: 367 MG/DL (ref 74–99)
METER GLUCOSE: 408 MG/DL (ref 74–99)
METER GLUCOSE: 452 MG/DL (ref 74–99)
MONOCYTES ABSOLUTE: 0.15 E9/L (ref 0.1–0.95)
MONOCYTES RELATIVE PERCENT: 2.8 % (ref 2–12)
NEUTROPHILS ABSOLUTE: 4.25 E9/L (ref 1.8–7.3)
NEUTROPHILS RELATIVE PERCENT: 78.7 % (ref 43–80)
PDW BLD-RTO: 15.8 FL (ref 11.5–15)
PLATELET # BLD: 233 E9/L (ref 130–450)
PMV BLD AUTO: 10.8 FL (ref 7–12)
POTASSIUM REFLEX MAGNESIUM: 4.2 MMOL/L (ref 3.5–5)
RBC # BLD: 3.77 E12/L (ref 3.5–5.5)
SODIUM BLD-SCNC: 141 MMOL/L (ref 132–146)
TOTAL PROTEIN: 5.6 G/DL (ref 6.4–8.3)
WBC # BLD: 5.4 E9/L (ref 4.5–11.5)

## 2022-10-13 PROCEDURE — 87449 NOS EACH ORGANISM AG IA: CPT

## 2022-10-13 PROCEDURE — 6360000002 HC RX W HCPCS: Performed by: FAMILY MEDICINE

## 2022-10-13 PROCEDURE — 6360000002 HC RX W HCPCS: Performed by: NURSE PRACTITIONER

## 2022-10-13 PROCEDURE — 2580000003 HC RX 258: Performed by: NURSE PRACTITIONER

## 2022-10-13 PROCEDURE — 6370000000 HC RX 637 (ALT 250 FOR IP): Performed by: NURSE PRACTITIONER

## 2022-10-13 PROCEDURE — 85025 COMPLETE CBC W/AUTO DIFF WBC: CPT

## 2022-10-13 PROCEDURE — 83605 ASSAY OF LACTIC ACID: CPT

## 2022-10-13 PROCEDURE — 2500000003 HC RX 250 WO HCPCS: Performed by: FAMILY MEDICINE

## 2022-10-13 PROCEDURE — 6360000002 HC RX W HCPCS: Performed by: INTERNAL MEDICINE

## 2022-10-13 PROCEDURE — 71260 CT THORAX DX C+: CPT

## 2022-10-13 PROCEDURE — 94640 AIRWAY INHALATION TREATMENT: CPT

## 2022-10-13 PROCEDURE — 97161 PT EVAL LOW COMPLEX 20 MIN: CPT | Performed by: PHYSICAL THERAPIST

## 2022-10-13 PROCEDURE — 2580000003 HC RX 258: Performed by: FAMILY MEDICINE

## 2022-10-13 PROCEDURE — 2700000000 HC OXYGEN THERAPY PER DAY

## 2022-10-13 PROCEDURE — 6370000000 HC RX 637 (ALT 250 FOR IP): Performed by: FAMILY MEDICINE

## 2022-10-13 PROCEDURE — 94660 CPAP INITIATION&MGMT: CPT

## 2022-10-13 PROCEDURE — APPSS180 APP SPLIT SHARED TIME > 60 MINUTES: Performed by: CLINICAL NURSE SPECIALIST

## 2022-10-13 PROCEDURE — 97110 THERAPEUTIC EXERCISES: CPT | Performed by: PHYSICAL THERAPIST

## 2022-10-13 PROCEDURE — 2580000003 HC RX 258: Performed by: INTERNAL MEDICINE

## 2022-10-13 PROCEDURE — 80053 COMPREHEN METABOLIC PANEL: CPT

## 2022-10-13 PROCEDURE — 6370000000 HC RX 637 (ALT 250 FOR IP): Performed by: CLINICAL NURSE SPECIALIST

## 2022-10-13 PROCEDURE — 2060000000 HC ICU INTERMEDIATE R&B

## 2022-10-13 PROCEDURE — 6360000004 HC RX CONTRAST MEDICATION: Performed by: RADIOLOGY

## 2022-10-13 PROCEDURE — 82962 GLUCOSE BLOOD TEST: CPT

## 2022-10-13 PROCEDURE — 99223 1ST HOSP IP/OBS HIGH 75: CPT | Performed by: INTERNAL MEDICINE

## 2022-10-13 PROCEDURE — 97530 THERAPEUTIC ACTIVITIES: CPT | Performed by: PHYSICAL THERAPIST

## 2022-10-13 PROCEDURE — 36415 COLL VENOUS BLD VENIPUNCTURE: CPT

## 2022-10-13 RX ORDER — ATORVASTATIN CALCIUM 40 MG/1
40 TABLET, FILM COATED ORAL NIGHTLY
Status: DISCONTINUED | OUTPATIENT
Start: 2022-10-13 | End: 2022-10-22 | Stop reason: HOSPADM

## 2022-10-13 RX ORDER — PREDNISONE 20 MG/1
40 TABLET ORAL DAILY
Status: DISCONTINUED | OUTPATIENT
Start: 2022-10-16 | End: 2022-10-14

## 2022-10-13 RX ORDER — METHYLPREDNISOLONE SODIUM SUCCINATE 40 MG/ML
40 INJECTION, POWDER, LYOPHILIZED, FOR SOLUTION INTRAMUSCULAR; INTRAVENOUS EVERY 12 HOURS
Status: DISCONTINUED | OUTPATIENT
Start: 2022-10-14 | End: 2022-10-14

## 2022-10-13 RX ADMIN — ASPIRIN 81 MG: 81 TABLET, COATED ORAL at 09:18

## 2022-10-13 RX ADMIN — IPRATROPIUM BROMIDE AND ALBUTEROL SULFATE 1 AMPULE: .5; 2.5 SOLUTION RESPIRATORY (INHALATION) at 02:00

## 2022-10-13 RX ADMIN — MONTELUKAST 10 MG: 10 TABLET, FILM COATED ORAL at 20:21

## 2022-10-13 RX ADMIN — Medication 10 ML: at 10:41

## 2022-10-13 RX ADMIN — IPRATROPIUM BROMIDE AND ALBUTEROL SULFATE 1 AMPULE: .5; 2.5 SOLUTION RESPIRATORY (INHALATION) at 09:29

## 2022-10-13 RX ADMIN — BUMETANIDE 1 MG: 0.25 INJECTION INTRAMUSCULAR; INTRAVENOUS at 09:18

## 2022-10-13 RX ADMIN — Medication 10 ML: at 02:11

## 2022-10-13 RX ADMIN — CARBIDOPA AND LEVODOPA 2 TABLET: 25; 100 TABLET, EXTENDED RELEASE ORAL at 20:22

## 2022-10-13 RX ADMIN — INSULIN LISPRO 16 UNITS: 100 INJECTION, SOLUTION INTRAVENOUS; SUBCUTANEOUS at 17:27

## 2022-10-13 RX ADMIN — IPRATROPIUM BROMIDE AND ALBUTEROL SULFATE 1 AMPULE: .5; 2.5 SOLUTION RESPIRATORY (INHALATION) at 12:21

## 2022-10-13 RX ADMIN — SUCRALFATE 1 G: 1 TABLET ORAL at 09:18

## 2022-10-13 RX ADMIN — APIXABAN 5 MG: 5 TABLET, FILM COATED ORAL at 09:18

## 2022-10-13 RX ADMIN — METOPROLOL SUCCINATE 100 MG: 50 TABLET, EXTENDED RELEASE ORAL at 20:20

## 2022-10-13 RX ADMIN — AZITHROMYCIN DIHYDRATE 500 MG: 500 INJECTION, POWDER, LYOPHILIZED, FOR SOLUTION INTRAVENOUS at 23:15

## 2022-10-13 RX ADMIN — BUMETANIDE 1 MG: 0.25 INJECTION INTRAMUSCULAR; INTRAVENOUS at 02:18

## 2022-10-13 RX ADMIN — ARFORMOTEROL TARTRATE 15 MCG: 15 SOLUTION RESPIRATORY (INHALATION) at 05:07

## 2022-10-13 RX ADMIN — MIRTAZAPINE 15 MG: 15 TABLET, ORALLY DISINTEGRATING ORAL at 20:19

## 2022-10-13 RX ADMIN — IPRATROPIUM BROMIDE AND ALBUTEROL SULFATE 1 AMPULE: .5; 2.5 SOLUTION RESPIRATORY (INHALATION) at 17:48

## 2022-10-13 RX ADMIN — CARBIDOPA AND LEVODOPA 2 TABLET: 25; 100 TABLET, EXTENDED RELEASE ORAL at 16:06

## 2022-10-13 RX ADMIN — WATER 1000 MG: 1 INJECTION INTRAMUSCULAR; INTRAVENOUS; SUBCUTANEOUS at 23:03

## 2022-10-13 RX ADMIN — SUCRALFATE 1 G: 1 TABLET ORAL at 02:18

## 2022-10-13 RX ADMIN — METOPROLOL SUCCINATE 100 MG: 50 TABLET, EXTENDED RELEASE ORAL at 09:17

## 2022-10-13 RX ADMIN — WATER 1000 MG: 1 INJECTION INTRAMUSCULAR; INTRAVENOUS; SUBCUTANEOUS at 02:18

## 2022-10-13 RX ADMIN — PIPERACILLIN AND TAZOBACTAM 3375 MG: 3; .375 INJECTION, POWDER, FOR SOLUTION INTRAVENOUS at 16:10

## 2022-10-13 RX ADMIN — ROPINIROLE HYDROCHLORIDE 1 MG: 1 TABLET, FILM COATED ORAL at 20:20

## 2022-10-13 RX ADMIN — ATORVASTATIN CALCIUM 40 MG: 40 TABLET, FILM COATED ORAL at 20:21

## 2022-10-13 RX ADMIN — ARFORMOTEROL TARTRATE 15 MCG: 15 SOLUTION RESPIRATORY (INHALATION) at 17:48

## 2022-10-13 RX ADMIN — CARBIDOPA AND LEVODOPA 2 TABLET: 25; 100 TABLET, EXTENDED RELEASE ORAL at 10:35

## 2022-10-13 RX ADMIN — PANTOPRAZOLE SODIUM 40 MG: 40 TABLET, DELAYED RELEASE ORAL at 09:18

## 2022-10-13 RX ADMIN — SUCRALFATE 1 G: 1 TABLET ORAL at 20:21

## 2022-10-13 RX ADMIN — ROPINIROLE HYDROCHLORIDE 1 MG: 1 TABLET, FILM COATED ORAL at 16:06

## 2022-10-13 RX ADMIN — AZITHROMYCIN DIHYDRATE 500 MG: 500 INJECTION, POWDER, LYOPHILIZED, FOR SOLUTION INTRAVENOUS at 02:17

## 2022-10-13 RX ADMIN — APIXABAN 5 MG: 5 TABLET, FILM COATED ORAL at 20:20

## 2022-10-13 RX ADMIN — IPRATROPIUM BROMIDE AND ALBUTEROL SULFATE 1 AMPULE: .5; 2.5 SOLUTION RESPIRATORY (INHALATION) at 22:42

## 2022-10-13 RX ADMIN — METHYLPREDNISOLONE SODIUM SUCCINATE 40 MG: 40 INJECTION, POWDER, FOR SOLUTION INTRAMUSCULAR; INTRAVENOUS at 16:05

## 2022-10-13 RX ADMIN — BUMETANIDE 1 MG: 0.25 INJECTION INTRAMUSCULAR; INTRAVENOUS at 20:22

## 2022-10-13 RX ADMIN — METHYLPREDNISOLONE SODIUM SUCCINATE 40 MG: 40 INJECTION, POWDER, FOR SOLUTION INTRAMUSCULAR; INTRAVENOUS at 02:45

## 2022-10-13 RX ADMIN — INSULIN GLARGINE 13 UNITS: 100 INJECTION, SOLUTION SUBCUTANEOUS at 20:23

## 2022-10-13 RX ADMIN — INSULIN LISPRO 16 UNITS: 100 INJECTION, SOLUTION INTRAVENOUS; SUBCUTANEOUS at 14:20

## 2022-10-13 RX ADMIN — ROPINIROLE HYDROCHLORIDE 1 MG: 1 TABLET, FILM COATED ORAL at 10:36

## 2022-10-13 RX ADMIN — PIPERACILLIN AND TAZOBACTAM 4500 MG: 4; .5 INJECTION, POWDER, FOR SOLUTION INTRAVENOUS at 12:06

## 2022-10-13 RX ADMIN — INSULIN LISPRO 4 UNITS: 100 INJECTION, SOLUTION INTRAVENOUS; SUBCUTANEOUS at 20:23

## 2022-10-13 RX ADMIN — METHYLPREDNISOLONE SODIUM SUCCINATE 40 MG: 40 INJECTION, POWDER, FOR SOLUTION INTRAMUSCULAR; INTRAVENOUS at 12:07

## 2022-10-13 RX ADMIN — IOPAMIDOL 75 ML: 755 INJECTION, SOLUTION INTRAVENOUS at 11:01

## 2022-10-13 RX ADMIN — INSULIN GLARGINE 13 UNITS: 100 INJECTION, SOLUTION SUBCUTANEOUS at 09:19

## 2022-10-13 RX ADMIN — IPRATROPIUM BROMIDE AND ALBUTEROL SULFATE 1 AMPULE: .5; 2.5 SOLUTION RESPIRATORY (INHALATION) at 05:06

## 2022-10-13 ASSESSMENT — ENCOUNTER SYMPTOMS: SHORTNESS OF BREATH: 1

## 2022-10-13 ASSESSMENT — PAIN - FUNCTIONAL ASSESSMENT: PAIN_FUNCTIONAL_ASSESSMENT: NONE - DENIES PAIN

## 2022-10-13 ASSESSMENT — LIFESTYLE VARIABLES
HOW MANY STANDARD DRINKS CONTAINING ALCOHOL DO YOU HAVE ON A TYPICAL DAY: PATIENT DOES NOT DRINK
HOW OFTEN DO YOU HAVE A DRINK CONTAINING ALCOHOL: NEVER

## 2022-10-13 ASSESSMENT — PAIN SCALES - GENERAL: PAINLEVEL_OUTOF10: 0

## 2022-10-13 NOTE — PROGRESS NOTES
Physical Therapy Initial Evaluation/Plan of Care    Room #:  2245/6965-26  Patient Name: Ap Lutz  YOB: 1950  MRN: [id number]    Date of Service: 10/13/2022     Tentative placement recommendation: [facility] with Physical Therapy   Equipment recommendation: Equipment at Nursing Home      Evaluating Physical Therapist: Willa Wilburn, [address]       Specific Provider Orders/Date/Referring Provider :  10/12/22 2130    PT evaluation and treat  Start:  10/12/22 2130,   End:  10/12/22 2130,   ONE TIME,   Standing Count:  1 Occurrences,   R         Leti Rinaldi, DO     Admitting Diagnosis:   COPD exacerbation ([address] [J44.1]  Acute on chronic respiratory failure with hypercapnia ([address] [J96.22]  Acute on chronic congestive heart failure, unspecified heart failure type ([address] [I50.9]     shortness of breath  Surgery: none  Visit Diagnoses         Codes    Acute on chronic respiratory failure with hypercapnia ([address]    -  Primary J96.22            Patient Active Problem List   Diagnosis    Depression with anxiety    Osteoporosis    Asthma    Hyperlipidemia    Mitral regurgitation    Obstructive sleep apnea syndrome    Psoriasis    Diabetes mellitus ([address]    Parkinson's disease ([address]    Primary hypertension    Microalbuminuria    Morbid obesity ([address]    RLS (restless legs syndrome)    Generalized weakness    Inability to walk    Hypothyroidism    Chest pain    Acute asthma exacerbation    Asthma exacerbation, mild    Paroxysmal atrial fibrillation (HCC)    Atrial fibrillation with RVR ([address]    Acute on chronic congestive heart failure ([address]    Coronary artery disease involving native coronary artery of native heart without angina pectoris    Dysphagia    Hepatosplenomegaly    Acute decompensated heart failure (HCC)    Acute diastolic (congestive) heart failure (HCC)    Nonrheumatic tricuspid valve regurgitation    Tobacco abuse    Recurrent syncope    Septic shock ([address]    Seizure-like activity ([facility]    SHANTANU (acute kidney injury) ([facility]    Pancytopenia (HCC)    Thrombocytopenia (HCC)    Encephalopathy    Acute respiratory failure with hypoxia (HCC)    Lactic acidosis    Delirium    Acute on chronic anemia    Pleural effusion, right    Acute respiratory failure with hypoxia and hypercapnia (HCC)    Acute confusion    Chronic diastolic (congestive) heart failure (HCC)    Macrocytosis    Other specified anemias    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[facility]    Puerperal sepsis with acute hypoxic respiratory failure without septic shock (HCC)    Pulmonary HTN (HCC)    Chronic anticoagulation    RVF (right ventricular failure) (HCC)    Metabolic alkalosis    Sepsis (HCC)    COPD exacerbation (HCC)        ASSESSMENT of Current Deficits Patient exhibits decreased strength, balance, and endurance impairing functional mobility, transfers, gait , gait distance, and tolerance to activity limited gait due to fatigue, maintains O2 saturation. Good assist with exercises and functional mobility. Patient requires continued skilled physical therapy to address concerns listed above for increased safety and function at discharge. PHYSICAL THERAPY  PLAN OF CARE       Physical therapy plan of care is established based on physician order,  patient diagnosis and clinical assessment    Current Treatment Recommendations:    -Bed Mobility: Lower extremity exercises   -Sitting Balance: Incorporate reaching activities to activate trunk muscles   -Standing Balance: Perform strengthening exercises in standing to promote motor control with or without upper extremity support   -Transfers: Provide instruction on proper hand and foot position for adequate transfer of weight onto lower extremities and use of gait device if needed and Cues for hand placement, technique and safety.  Provide stabilization to prevent fall   -Gait: Gait training, Standing activities to improve: base of support, weight shift, weight bearing , and Pregait training to emphasize: Sequencing , Device control, Upright, and Safety   -Endurance: Utilize Supervised activities to increase level of endurance to allow for safe functional mobility including transfers and gait     PT long term treatment goals are located in below grid    Patient and or family understand(s) diagnosis, prognosis, and plan of care. Frequency of treatments: Patient will be seen  daily.          Prior Level of Function: Patient ambulated with wheeled walker   short distance, transfers to wheelchair  Rehab Potential: good   for baseline    Past medical history:   Past Medical History:   Diagnosis Date    A-fib ([facility]     Acute on chronic congestive heart failure (HCC)     Anxiety     Asthma     CAD (coronary artery disease) 01/21/2016    Cancer ([facility]  breast ca 2006    right lumpectomy    Chronic kidney disease     nephrolithiasis    COPD exacerbation ([facility] 10/12/2022    Depression     Diabetes mellitus ([facility]     H/O mammogram     Hx MRSA infection     toe infection january 2012    Hyperlipidemia     Hypertension     Lateral epicondylitis     Morbid obesity ([facility]     SERENA on CPAP     Oxygen dependent     Parkinson's disease ([facility]     Tubal ligation status      Past Surgical History:   Procedure Laterality Date    BREAST LUMPECTOMY      BREAST REDUCTION SURGERY      CARDIAC CATHETERIZATION  4/28/2014    Dr. Gunner Darling  01/11/2022    Dr. Pepper Leach  7/29/15    CT GUIDED CHEST TUBE  7/8/2022    CT GUIDED CHEST TUBE 7/8/2022 Josué Horner MD [facility] CT    ENDOSCOPY, COLON, DIAGNOSTIC  7/19/15    GALLBLADDER SURGERY      LUMBAR LAMINECTOMY      TOE AMPUTATION      TONSILLECTOMY      UPPER GASTROINTESTINAL ENDOSCOPY      UPPER GASTROINTESTINAL ENDOSCOPY N/A 7/19/2022    EGD ESOPHAGOGASTRODUODENOSCOPY performed by Jen Caban MD at [facility] ENDOSCOPY       SUBJECTIVE:    Precautions: Up as tolerated, falls ,   O2, Parkinson's disease    Imaging results: XR CHEST (2 VW)    Result Date: 10/12/2022  EXAMINATION: TWO XRAY VIEWS OF THE CHEST 10/12/2022 5:19 pm      Cardiomegaly with persistent interstitial and alveolar pulmonary edema which is most pronounced on the right. CT HEAD WO CONTRAST    Result Date: 10/9/2022  EXAMINATION: CT OF THE HEAD WITHOUT CONTRAST  10/9/2022 12:47 pm      No acute intracranial abnormality. CT ABDOMEN PELVIS W IV CONTRAST Additional Contrast? None    Result Date: 10/6/2022  EXAMINATION: CT OF THE ABDOMEN AND PELVIS WITH CONTRAST 10/6/2022 11:33 am    Subtle suggestion of hepatic steatosis and cirrhosis including portal venous hypertension with splenomegaly. Small volume abdominal ascites and mild anasarca. Bilateral lower lobe infiltrates and pleural effusions, right more than left. Pneumonia is suspected. Stable benign left adrenal adenoma. No imaging follow-up is required. Chronic left renal scarring probably due to remote infection. Small collection of gas within the endometrial cavity presumably iatrogenically introduced. No definite uterine pathology is appreciated. No focal uterine fluid collections. The uterus could be further evaluated sonographically if clinically indicated. Stool-filled colon particularly on the right suggesting constipation. XR CHEST PORTABLE    Result Date: 10/8/2022  EXAMINATION: ONE XRAY VIEW OF THE CHEST 10/8/2022 7:04 am      Redemonstration of interstitial pulmonary edema or pneumonia bilaterally appearing to have increased in right lung base.     Vevelyn Lye is most likely a confluence of pulmonary vessels. US DUP UPPER EXTREMITY LEFT VENOUS    Result Date: 10/8/2022  EXAMINATION: VENOUS ULTRASOUND OF THE LEFT UPPER EXTREMITY, 10/8/2022 6:54 pm      No evidence of DVT.  RECOMMENDATIONS: Unavailable     44e  Social history: Patient lives in a skilled nursing facility [place] [id number] owned: Carissa Salas, [address] [employer], and [address],       [address] [place] [address] [facility] Mobility Inpatient   How much difficulty turning over in bed?: A Little  How much difficulty sitting down on / standing up from a chair with arms?: A Little  How much difficulty moving from lying on back to sitting on side of bed?: A Little  How much help from another person moving to and from a bed to a chair?: A Little  How much help from another person needed to walk in hospital room?: Total  How much help from another person for climbing 3-5 steps with a railing?: Total  AM-PAC Inpatient Mobility Raw Score : 14  AM-PAC Inpatient T-Scale Score : 38.1  Mobility Inpatient CMS 0-100% Score: 61.29  Mobility Inpatient CMS G-Code Modifier : CL    Nursing cleared patient for PT evaluation. The admitting diagnosis and active problem list as listed above have been reviewed prior to the initiation of this evaluation. OBJECTIVE;   Initial Evaluation  Date: 10/13/2022 Treatment Date:     Short Term/ Long Term   Goals   Was pt agreeable to Eval/treatment? Yes  To be met in 3 days   Pain level   0/10        Bed Mobility    Rolling: Not assessed     Supine to sit: Minimal assist of 1    Sit to supine: Not assessed     Scooting: Minimal assist of 1    Rolling: Independent    Supine to sit:  Independent    Sit to supine: Independent    Scooting: Independent     Transfers Sit to stand: Minimal assist of 1 Cues for hand placement and safety   Sit to stand: Supervision      Ambulation     5 feet using  wheeled walker with Minimal assist of 1   for walker control and cues for sequencing, upright posture, and safety    20 feet using  wheeled walker with Supervision     Stair negotiation: ascended and descended   Not assessed          ROM Within functional limits     Increase range of motion 10% of affected joints    Strength BUE:  refer to OT eval  RLE:  3+ 4-/5  LLE:  3+ 4-/5  Increase strength in affected mm groups by 1/3 grade   Balance Sitting EOB:  good -  Dynamic Standing:  fair wheeled walker   Sitting EOB:  good Dynamic Standing: good -wheeled walker      Patient is Alert & Oriented x person, place, time, and situation and follows directions    Sensation:  Patient  reports numbness/tingling bilateral toes     Edema:  yes bilateral lower extremities   Endurance: fair       Vitals:  4 liters high flow nasal cannula   Blood Pressure at rest  Blood Pressure during session    Heart Rate at rest 73 Heart Rate during session 94   SPO2 at rest 100%  SPO2 during session 100%     Patient education  Patient educated on role of Physical Therapy, risks of immobility, safety and plan of care,  importance of mobility while in hospital , and ankle pumps, quad set and glut set for edema control, blood clot prevention     Patient response to education:   Pt verbalized understanding Pt demonstrated skill Pt requires further education in this area   Yes Partial Yes      Treatment:  Patient practiced and was instructed/facilitated in the following treatment: Patient performed exercises, assisted to Edge of bed  Sat edge of bed 10 minutes with Supervision  to increase dynamic sitting balance and activity tolerance. Assisted up to chair, completed exercises, set up with lunch tray. Therapeutic Exercises:  ankle pumps, heel raises, quad sets, glut sets, long arc quad, and seated marching  x 15 reps. At end of session, patient in chair with     call light and phone within reach,  all lines and tubes intact, nursing notified. Patient would benefit from continued skilled Physical Therapy to improve functional independence and quality of life.          Patient's/ family goals   Return to [facility]    Time in  1358  Time out  1446    Total Treatment Time  28 minutes    Evaluation time includes thorough review of current medical information, gathering information on past medical history/social history and prior level of function, completion of standardized testing/informal observation of tasks, assessment of data, and development of Plan of care and goals.      CPT codes:  Low Complexity PT evaluation (08686)  Therapeutic activities (85636)   15 minutes  1 unit(s)  Therapeutic exercises (90559)   8 minutes  1 unit(s)  Non billable time 5 minutes    Katrinka Phalen, BRE

## 2022-10-13 NOTE — PROGRESS NOTES
Pulmonary/Critical Care Progress Note    We are following patient for acute superimposed on chronic hypoxemic and hypercapnic respiratory failure, exacerbation of COPD, alveolar hypoventilation syndrome, CKD, CHF, atrial fibrillation, diabetes mellitus type 2, morbid obesity, obstructive sleep apnea, Parkinson's disease    SUBJECTIVE:  The patient is feeling much better now and is playing binPacerPro games on her phone. She does not have a BiPAP set up in a nursing home and therefore, patient deteriorates rapidly. She was only discharged less than 2 days ago. Arterial blood gases in the emergency room originally showed a pH of 7.27 PCO2 86 PO2 93. She was placed on BiPAP woke up and feels much better at which point she was transferred to the monitored floor.     MEDICATIONS:   piperacillin-tazobactam  3,375 mg IntraVENous Q8H    atorvastatin  40 mg Oral Nightly    vancomycin  1,250 mg IntraVENous Q24H    [START ON 10/14/2022] methylPREDNISolone  40 mg IntraVENous Q12H    Followed by    Chano Holland ON 10/16/2022] predniSONE  40 mg Oral Daily    sodium chloride flush  5-40 mL IntraVENous 2 times per day    apixaban  5 mg Oral BID    carbidopa-levodopa  2 tablet Oral TID    insulin glargine  13 Units SubCUTAneous BID    metoprolol succinate  100 mg Oral BID    mirtazapine  15 mg Oral Nightly    montelukast  10 mg Oral Nightly    pantoprazole  40 mg Oral QAM    rOPINIRole  1 mg Oral TID    sucralfate  1 g Oral 4x Daily    bumetanide  1 mg IntraVENous BID    insulin lispro  0-16 Units SubCUTAneous TID WC    insulin lispro  0-4 Units SubCUTAneous Nightly    Arformoterol Tartrate  15 mcg Nebulization BID    ipratropium-albuterol  1 ampule Inhalation Q4H    cefTRIAXone (ROCEPHIN) IV  1,000 mg IntraVENous Q24H    azithromycin  500 mg IntraVENous Q24H      sodium chloride       sodium chloride flush, sodium chloride, ondansetron **OR** ondansetron, polyethylene glycol, acetaminophen **OR** acetaminophen, docusate sodium, ipratropium-albuterol, LORazepam, loperamide, promethazine      REVIEW OF SYSTEMS:  Constitutional: Denies fever, weight loss, night sweats, and fatigue  Skin: Denies pigmentation, dark lesions, and rashes   HEENT: Denies hearing loss, tinnitus, ear drainage, epistaxis, sore throat, and hoarseness. Cardiovascular: Denies palpitations, chest pain, and chest pressure. Respiratory: Denies cough, dyspnea at rest, hemoptysis, apnea, and choking. Gastrointestinal: Denies nausea, vomiting, poor appetite, diarrhea, heartburn or reflux  Genitourinary: Denies dysuria, frequency, urgency or hematuria  Musculoskeletal: Denies myalgias, muscle weakness, and bone pain  Neurological: Denies dizziness, vertigo, headache, and focal weakness  Psychological: Denies anxiety and depression  Endocrine: Denies heat intolerance and cold intolerance  Hematopoietic/Lymphatic: Denies bleeding problems and blood transfusions    OBJECTIVE:  Vitals:    10/13/22 1749   BP:    Pulse: (!) 108   Resp: 17   Temp:    SpO2: 99%     FiO2 : 40 %  O2 Flow Rate (L/min): 4 L/min  O2 Device: Nasal cannula    PHYSICAL EXAM:  Constitutional: No fever, chills, diaphoresis  Skin: No skin rash, no skin breakdown  HEENT: Unremarkable  Neck: Neck circumference, small oropharyngeal opening  Cardiovascular: S1, S2 regular. No S3 or rubs present  Respiratory: Clear to auscultation bilaterally. No wheezes minimal basilar crackles on the right  Gastrointestinal: Soft, markedly obese, nontender  Genitourinary: No CVA tenderness  Extremities: One plus edema feet legs and ankles  Neurological: Awake, alert, oriented x3. No evidence of focal motor or sensory deficits  Psychological: Good spirits. Appropriate affect.     LABS:  WBC   Date Value Ref Range Status   10/13/2022 5.4 4.5 - 11.5 E9/L Final   10/12/2022 8.2 4.5 - 11.5 E9/L Final   10/08/2022 3.1 (L) 4.5 - 11.5 E9/L Final     Hemoglobin   Date Value Ref Range Status   10/13/2022 10.0 (L) 11.5 - 15.5 g/dL Final 10/12/2022 10.6 (L) 11.5 - 15.5 g/dL Final   10/08/2022 8.5 (L) 11.5 - 15.5 g/dL Final     Hematocrit   Date Value Ref Range Status   10/13/2022 34.3 34.0 - 48.0 % Final   10/12/2022 36.2 34.0 - 48.0 % Final   10/08/2022 28.9 (L) 34.0 - 48.0 % Final     MCV   Date Value Ref Range Status   10/13/2022 91.0 80.0 - 99.9 fL Final   10/12/2022 91.6 80.0 - 99.9 fL Final   10/08/2022 91.2 80.0 - 99.9 fL Final     Platelets   Date Value Ref Range Status   10/13/2022 233 130 - 450 E9/L Final   10/12/2022 282 130 - 450 E9/L Final   10/08/2022 136 130 - 450 E9/L Final     Sodium   Date Value Ref Range Status   10/13/2022 141 132 - 146 mmol/L Final   10/12/2022 139 132 - 146 mmol/L Final   10/08/2022 144 132 - 146 mmol/L Final     Potassium   Date Value Ref Range Status   10/08/2022 4.0 3.5 - 5.0 mmol/L Final   10/07/2022 3.7 3.5 - 5.0 mmol/L Final   10/06/2022 4.0 3.5 - 5.0 mmol/L Final     Potassium reflex Magnesium   Date Value Ref Range Status   10/13/2022 4.2 3.5 - 5.0 mmol/L Final   10/12/2022 4.3 3.5 - 5.0 mmol/L Final   10/05/2022 4.2 3.5 - 5.0 mmol/L Final     Chloride   Date Value Ref Range Status   10/13/2022 99 98 - 107 mmol/L Final   10/12/2022 93 (L) 98 - 107 mmol/L Final   10/08/2022 106 98 - 107 mmol/L Final     CO2   Date Value Ref Range Status   10/13/2022 32 (H) 22 - 29 mmol/L Final   10/12/2022 39 (H) 22 - 29 mmol/L Final   10/08/2022 34 (H) 22 - 29 mmol/L Final     BUN   Date Value Ref Range Status   10/13/2022 19 6 - 23 mg/dL Final   10/12/2022 19 6 - 23 mg/dL Final   10/08/2022 21 6 - 23 mg/dL Final     Creatinine   Date Value Ref Range Status   10/13/2022 0.9 0.5 - 1.0 mg/dL Final   10/12/2022 0.9 0.5 - 1.0 mg/dL Final   10/08/2022 1.0 0.5 - 1.0 mg/dL Final     Glucose   Date Value Ref Range Status   10/13/2022 215 (H) 74 - 99 mg/dL Final   10/12/2022 207 (H) 74 - 99 mg/dL Final   10/08/2022 174 (H) 74 - 99 mg/dL Final     Calcium   Date Value Ref Range Status   10/13/2022 8.1 (L) 8.6 - 10.2 mg/dL Final 10/12/2022 9.2 8.6 - 10.2 mg/dL Final   10/08/2022 8.5 (L) 8.6 - 10.2 mg/dL Final     Total Protein   Date Value Ref Range Status   10/13/2022 5.6 (L) 6.4 - 8.3 g/dL Final   10/08/2022 4.8 (L) 6.4 - 8.3 g/dL Final   10/07/2022 5.3 (L) 6.4 - 8.3 g/dL Final     Albumin   Date Value Ref Range Status   10/13/2022 3.1 (L) 3.5 - 5.2 g/dL Final   10/08/2022 2.5 (L) 3.5 - 5.2 g/dL Final   10/07/2022 2.8 (L) 3.5 - 5.2 g/dL Final     Total Bilirubin   Date Value Ref Range Status   10/13/2022 0.3 0.0 - 1.2 mg/dL Final   10/08/2022 0.4 0.0 - 1.2 mg/dL Final   10/07/2022 0.5 0.0 - 1.2 mg/dL Final     Alkaline Phosphatase   Date Value Ref Range Status   10/13/2022 87 35 - 104 U/L Final   10/08/2022 74 35 - 104 U/L Final   10/07/2022 71 35 - 104 U/L Final     AST   Date Value Ref Range Status   10/13/2022 12 0 - 31 U/L Final     Comment:     Specimen is slightly Hemolyzed. Result may be artificially increased. 10/08/2022 7 0 - 31 U/L Final   10/07/2022 8 0 - 31 U/L Final     ALT   Date Value Ref Range Status   10/13/2022 <5 0 - 32 U/L Final   10/08/2022 5 0 - 32 U/L Final   10/07/2022 7 0 - 32 U/L Final     GFR Non-   Date Value Ref Range Status   10/13/2022 >60 >=60 mL/min/1.73 Final     Comment:     Chronic Kidney Disease: less than 60 ml/min/1.73 sq.m. Kidney Failure: less than 15 ml/min/1.73 sq.m. Results valid for patients 18 years and older. 10/12/2022 >60 >=60 mL/min/1.73 Final     Comment:     Chronic Kidney Disease: less than 60 ml/min/1.73 sq.m. Kidney Failure: less than 15 ml/min/1.73 sq.m. Results valid for patients 18 years and older. 10/08/2022 54 >=60 mL/min/1.73 Final     Comment:     Chronic Kidney Disease: less than 60 ml/min/1.73 sq.m. Kidney Failure: less than 15 ml/min/1.73 sq.m. Results valid for patients 18 years and older.        GFR    Date Value Ref Range Status   10/13/2022 >60  Final   10/12/2022 >60  Final   10/08/2022 >60  Final Magnesium   Date Value Ref Range Status   10/08/2022 1.9 1.6 - 2.6 mg/dL Final   10/07/2022 1.8 1.6 - 2.6 mg/dL Final   10/06/2022 1.8 1.6 - 2.6 mg/dL Final     Phosphorus   Date Value Ref Range Status   10/08/2022 3.0 2.5 - 4.5 mg/dL Final   10/07/2022 2.8 2.5 - 4.5 mg/dL Final   10/06/2022 2.6 2.5 - 4.5 mg/dL Final     Recent Labs     10/12/22  2353   HCO3 34.2*   BE 7.9*   O2SAT 88.2*       RADIOLOGY:  CT CHEST W CONTRAST   Final Result   1. Interlobular septal thickening in the upper lungs, bilateral pleural   effusions greater on the right, 4 chamber cardiac enlargement, pericardial   effusion and hepatosplenomegaly. Trace right upper quadrant perihepatic   ascites. Findings suggest volume overload/CHF. 2.  Partial passive atelectasis of the lower lobes, right middle lobe and   lingular segment. 3.  Prominent main pulmonary artery, consider pulmonary artery hypertension. No filling defects in the pulmonary artery to suggest embolism. 4.  Postop changes in the right axilla and right breast.         XR CHEST (2 VW)   Final Result   Cardiomegaly with persistent interstitial and alveolar pulmonary edema which   is most pronounced on the right. PROBLEM LIST:  Principal Problem:    COPD exacerbation ([address]  Active Problems:    Acute on chronic respiratory failure with hypercapnia (HCC)    Persistent atrial fibrillation (HCC)  Resolved Problems:    * No resolved hospital problems.  *      IMPRESSION:  Acute superimposed on chronic hypoxemic and hypercapnic respiratory failure  Cor pulmonale  COPD, severe  Small bilateral pleural effusions likely HFpEF  CKD  Atrial fibrillation on Eliquis  Severe pulmonary hypertension with an element of right heart failure  Morbid obesity  Diabetes mellitus type 2    PLAN:  Continue antibiotics  Continue aerosolized bronchodilators  Patient must have BiPAP nightly whether it be here or the nursing home  Change BiPAP settings to a lower IPAP and higher EPAP  Continue inhaled steroids  Rapid taper of systemic steroids  Chest x-ray in a.m. Labs in a.m. ATTESTATION:  ICU Staff Physician note of personal involvement in Care  As the attending physician, I certify that I personally reviewed the patients history and personally examined the patient to confirm the physical findings described above,  And that I reviewed the relevant imaging studies and available reports. I also discussed the differential diagnosis and all of the proposed management plans with the patient and individuals accompanying the patient to this visit. They had the opportunity to ask questions about the proposed management plans and to have those questions answered. This patient has a high probability of sudden, clinically significant deterioration, which requires the highest level of physician preparedness to intervene urgently. I managed/supervised life or organ supporting interventions that required frequent physician assessment. I devoted my full attention to the direct care of this patient for the amount of time indicated below. Time I spent with the family or surrogate(s) is included only if the patient was incapable of providing the necessary information or participating in medical decisions - Time devoted to teaching and to any procedures I billed separately is not included.     CRITICAL CARE TIME:  36 minutes    Electronically signed by Jeanna Hollis MD on 10/13/2022 at 6:09 PM

## 2022-10-13 NOTE — PLAN OF CARE
Problem: Discharge Planning  Goal: Discharge to home or other facility with appropriate resources  Outcome: Progressing  Flowsheets (Taken 10/13/2022 1305)  Discharge to home or other facility with appropriate resources: Identify barriers to discharge with patient and caregiver     Problem: Safety - Adult  Goal: Free from fall injury  Outcome: Progressing     Problem: ABCDS Injury Assessment  Goal: Absence of physical injury  Outcome: Progressing  Flowsheets (Taken 10/13/2022 1319)  Absence of Physical Injury: Implement safety measures based on patient assessment     Problem: Skin/Tissue Integrity  Goal: Absence of new skin breakdown  Description: 1. Monitor for areas of redness and/or skin breakdown  2. Assess vascular access sites hourly  3. Every 4-6 hours minimum:  Change oxygen saturation probe site  4. Every 4-6 hours:  If on nasal continuous positive airway pressure, respiratory therapy assess nares and determine need for appliance change or resting period.   Outcome: Progressing

## 2022-10-13 NOTE — ED NOTES
PT continues to experience hypotension. Mauricio Chartlon, DO aware. Ordered 500ML of fluid bolus via phone. Also suggested pt be admitted to ICU.          Nba Squires LPN  34/96/98 [id number]

## 2022-10-13 NOTE — CONSULTS
Pulmonary/Critical Care Consult Note    CHIEF COMPLAINT: Acute on chronic hypoxic and hypercapnic respiratory failure, COPD exacerbation, CAD, CKD, CHF, neck atrial fibrillation, diabetes mellitus type 2, hypertension, hyperlipidemia, morbid obesity, obstructive sleep apnea on CPAP, oxygen dependent, and Parkinson's disease    HISTORY OF PRESENT ILLNESS: Patient is a 65-year-old female with history of COPD, CAD, CKD, CHF, atrial fibrillation, type 2 diabetes, hypertension hyperlipidemia, morbid obesity, obstructive sleep apnea on CPAP, oxygen dependence, and Parkinson's disease. Patient presented to the ED with complaints of increased shortness of breath since earlier this morning. Has history of COPD and is on supplemental oxygen at home. Patient denies dizziness, headache, fever, chills, chest pain, palpitations, abdominal pain, urinary symptoms, constipation or diarrhea. Patient reports 1 episode of vomiting earlier this morning but no further episodes since. He also reports a nonproductive cough. She is on supplemental oxygen at home but is unsure how much she normally uses. Initial ABG obtained in ED showed a pH of 7.27, PCO2 85.6, PO2 93.2, HCO3 39.3, and SPO2 95.3 on nasal cannula. Patient was subsequently placed on BiPAP. Also received 40 mg of Lasix IV and Ativan 0.5 mg IV x1 in the ED prior to admission. Portable chest x-ray obtained in ED prior to admission shows cardiomegaly with persistent interstitial and alveolar pulmonary edema which is most pronounced on the right. ALLERGY:  Ciprofloxacin and Codeine    FAMILY HISTORY:  Family History   Problem Relation Age of Onset    Cancer Mother         Lung Ca    Cancer Father         lung Ca    Diabetes Sister     Heart Disease Sister     Seizures Son     Other Brother         sepsis       SOCIAL HISTORY:  Social History     Socioeconomic History    Marital status:       Spouse name: Not on file    Number of children: Not on file    Years of education: Not on file    Highest education level: Not on file   Occupational History    Not on file   Tobacco Use    Smoking status: Never    Smokeless tobacco: Never   Vaping Use    Vaping Use: Never used   Substance and Sexual Activity    Alcohol use: No    Drug use: No    Sexual activity: Never   Other Topics Concern    Not on file   Social History Narrative    Not on file     Social Determinants of Health     Financial Resource Strain: Not on file   Food Insecurity: Not on file   Transportation Needs: Not on file   Physical Activity: Not on file   Stress: Not on file   Social Connections: Not on file   Intimate Partner Violence: Not on file   Housing Stability: Not on file       MEDICAL HISTORY:  Past Medical History:   Diagnosis Date    A-fib [facility])     Acute on chronic congestive heart failure (HCC)     Anxiety     Asthma     CAD (coronary artery disease) 01/21/2016    Cancer ([facility] [address]  breast ca 2006    right lumpectomy    Chronic kidney disease     nephrolithiasis    COPD exacerbation ([facility] [address] 10/12/2022    Depression     Diabetes mellitus ([facility] [address]     H/O mammogram     Hx MRSA infection     toe infection january 2012    Hyperlipidemia     Hypertension     Lateral epicondylitis     Morbid obesity ([facility] [address]     SERENA on CPAP     Oxygen dependent     Parkinson's disease ([facility] [address]     Tubal ligation status        MEDICATIONS:   sodium chloride flush  5-40 mL IntraVENous 2 times per day    methylPREDNISolone  40 mg IntraVENous Q6H    Followed by    [START ON 10/15/2022] predniSONE  40 mg Oral Daily    apixaban  5 mg Oral BID    atorvastatin  20 mg Oral Nightly    [START ON 10/13/2022] aspirin EC  81 mg Oral Daily    carbidopa-levodopa  2 tablet Oral TID    insulin glargine  13 Units SubCUTAneous BID    metoprolol succinate  100 mg Oral BID    mirtazapine  15 mg Oral Nightly    montelukast  10 mg Oral Nightly    [START ON 10/13/2022] pantoprazole  40 mg Oral QAM    rOPINIRole  1 mg Oral TID    sucralfate  1 g Oral 4x Daily bumetanide  1 mg IntraVENous BID    [START ON 10/13/2022] insulin lispro  0-16 Units SubCUTAneous TID WC    insulin lispro  0-4 Units SubCUTAneous Nightly    Arformoterol Tartrate  15 mcg Nebulization BID    And    budesonide  0.5 mg Nebulization BID    [START ON 10/13/2022] ipratropium-albuterol  1 ampule Inhalation Q4H      sodium chloride       sodium chloride flush, sodium chloride, ondansetron **OR** ondansetron, polyethylene glycol, acetaminophen **OR** acetaminophen, docusate sodium, ipratropium-albuterol, LORazepam, loperamide, promethazine    REVIEW OF SYSTEMS:  Constitutional: Denies fever, weight loss, night sweats, and fatigue  Skin: Denies pigmentation, dark lesions, and rashes   HEENT: Denies hearing loss, tinnitus, ear drainage, epistaxis, sore throat, and hoarseness. Cardiovascular: Denies palpitations, chest pain, and chest pressure. Respiratory: Reports infrequent nonproductive cough, dyspnea at rest worse with exertion, denies hemoptysis, apnea, and choking.   Gastrointestinal: Reports nausea and 1 episode of vomiting, denies poor appetite, diarrhea, heartburn or reflux  Genitourinary: Denies dysuria, frequency, urgency or hematuria  Musculoskeletal: Denies myalgias, muscle weakness, and bone pain  Neurological: Denies dizziness, vertigo, headache, and focal weakness  Psychological: Denies anxiety and depression  Endocrine: Denies heat intolerance and cold intolerance  Hematopoietic/Lymphatic: Denies bleeding problems and blood transfusions    PHYSICAL EXAM:  Vitals:    10/12/22 2156   BP:    Pulse:    Resp: 22   Temp:    SpO2:      FiO2 : 25 %  O2 Flow Rate (L/min): 3 L/min  O2 Device: PAP (positive airway pressure)    Constitutional: Fever, chills, diaphoresis  HEENT: No head lesions, PERRL, EOMI, mouth without lesions, no nasal lesions, no cervical adenopathy palpated   Respiratory: Lungs with equal breath sounds diminished bilaterally, no adventitious sounds auscultated, no accessory muscle use   CV: Regular rate and rhythm, no murmurs, JVD, 2+ bilateral leg edema   Abdomen: Soft, obese, nontender, + bowel sounds, no lesions   Skin: Adequate turgor, no rash, capillary refill <2 seconds   Extremities: Muscular strength 4/4 in 4 limbs, moves 4 limbs spontaneously, distal pulses present   Neurology: Somnolent, arouses easily, follows commands, moves 4 limbs on command and spontaneously, equal sensation, no dysmetria, neck is supple, no meningitic signs present.     LABS:  WBC   Date Value Ref Range Status   10/12/2022 8.2 4.5 - 11.5 E9/L Final   10/08/2022 3.1 (L) 4.5 - 11.5 E9/L Final   10/07/2022 3.7 (L) 4.5 - 11.5 E9/L Final     Hemoglobin   Date Value Ref Range Status   10/12/2022 10.6 (L) 11.5 - 15.5 g/dL Final   10/08/2022 8.5 (L) 11.5 - 15.5 g/dL Final   10/07/2022 9.1 (L) 11.5 - 15.5 g/dL Final     Hematocrit   Date Value Ref Range Status   10/12/2022 36.2 34.0 - 48.0 % Final   10/08/2022 28.9 (L) 34.0 - 48.0 % Final   10/07/2022 31.5 (L) 34.0 - 48.0 % Final     MCV   Date Value Ref Range Status   10/12/2022 91.6 80.0 - 99.9 fL Final   10/08/2022 91.2 80.0 - 99.9 fL Final   10/07/2022 93.2 80.0 - 99.9 fL Final     Platelets   Date Value Ref Range Status   10/12/2022 282 130 - 450 E9/L Final   10/08/2022 136 130 - 450 E9/L Final   10/07/2022 138 130 - 450 E9/L Final     Sodium   Date Value Ref Range Status   10/12/2022 139 132 - 146 mmol/L Final   10/08/2022 144 132 - 146 mmol/L Final   10/07/2022 141 132 - 146 mmol/L Final     Potassium   Date Value Ref Range Status   10/08/2022 4.0 3.5 - 5.0 mmol/L Final   10/07/2022 3.7 3.5 - 5.0 mmol/L Final   10/06/2022 4.0 3.5 - 5.0 mmol/L Final     Potassium reflex Magnesium   Date Value Ref Range Status   10/12/2022 4.3 3.5 - 5.0 mmol/L Final   10/05/2022 4.2 3.5 - 5.0 mmol/L Final   10/03/2022 3.8 3.5 - 5.0 mmol/L Final     Chloride   Date Value Ref Range Status   10/12/2022 93 (L) 98 - 107 mmol/L Final   10/08/2022 106 98 - 107 mmol/L Final   10/07/2022 103 98 - 107 mmol/L Final     CO2   Date Value Ref Range Status   10/12/2022 39 (H) 22 - 29 mmol/L Final   10/08/2022 34 (H) 22 - 29 mmol/L Final   10/07/2022 34 (H) 22 - 29 mmol/L Final     BUN   Date Value Ref Range Status   10/12/2022 19 6 - 23 mg/dL Final   10/08/2022 21 6 - 23 mg/dL Final   10/07/2022 24 (H) 6 - 23 mg/dL Final     Creatinine   Date Value Ref Range Status   10/12/2022 0.9 0.5 - 1.0 mg/dL Final   10/08/2022 1.0 0.5 - 1.0 mg/dL Final   10/07/2022 1.0 0.5 - 1.0 mg/dL Final     Glucose   Date Value Ref Range Status   10/12/2022 207 (H) 74 - 99 mg/dL Final   10/08/2022 174 (H) 74 - 99 mg/dL Final   10/07/2022 149 (H) 74 - 99 mg/dL Final     Calcium   Date Value Ref Range Status   10/12/2022 9.2 8.6 - 10.2 mg/dL Final   10/08/2022 8.5 (L) 8.6 - 10.2 mg/dL Final   10/07/2022 8.5 (L) 8.6 - 10.2 mg/dL Final     Total Protein   Date Value Ref Range Status   10/08/2022 4.8 (L) 6.4 - 8.3 g/dL Final   10/07/2022 5.3 (L) 6.4 - 8.3 g/dL Final   10/06/2022 5.2 (L) 6.4 - 8.3 g/dL Final     Albumin   Date Value Ref Range Status   10/08/2022 2.5 (L) 3.5 - 5.2 g/dL Final   10/07/2022 2.8 (L) 3.5 - 5.2 g/dL Final   10/06/2022 3.0 (L) 3.5 - 5.2 g/dL Final     Total Bilirubin   Date Value Ref Range Status   10/08/2022 0.4 0.0 - 1.2 mg/dL Final   10/07/2022 0.5 0.0 - 1.2 mg/dL Final   10/06/2022 0.5 0.0 - 1.2 mg/dL Final     Alkaline Phosphatase   Date Value Ref Range Status   10/08/2022 74 35 - 104 U/L Final   10/07/2022 71 35 - 104 U/L Final   10/06/2022 68 35 - 104 U/L Final     AST   Date Value Ref Range Status   10/08/2022 7 0 - 31 U/L Final   10/07/2022 8 0 - 31 U/L Final   10/06/2022 8 0 - 31 U/L Final     ALT   Date Value Ref Range Status   10/08/2022 5 0 - 32 U/L Final   10/07/2022 7 0 - 32 U/L Final   10/06/2022 5 0 - 32 U/L Final     GFR Non-   Date Value Ref Range Status   10/12/2022 >60 >=60 mL/min/1.73 Final     Comment:     Chronic Kidney Disease: less than 60 ml/min/1.73 sq.m. Kidney Failure: less than 15 ml/min/1.73 sq.m. Results valid for patients 18 years and older. 10/08/2022 54 >=60 mL/min/1.73 Final     Comment:     Chronic Kidney Disease: less than 60 ml/min/1.73 sq.m. Kidney Failure: less than 15 ml/min/1.73 sq.m. Results valid for patients 18 years and older. 10/07/2022 54 >=60 mL/min/1.73 Final     Comment:     Chronic Kidney Disease: less than 60 ml/min/1.73 sq.m. Kidney Failure: less than 15 ml/min/1.73 sq.m. Results valid for patients 18 years and older. GFR    Date Value Ref Range Status   10/12/2022 >60  Final   10/08/2022 >60  Final   10/07/2022 >60  Final     Magnesium   Date Value Ref Range Status   10/08/2022 1.9 1.6 - 2.6 mg/dL Final   10/07/2022 1.8 1.6 - 2.6 mg/dL Final   10/06/2022 1.8 1.6 - 2.6 mg/dL Final     Phosphorus   Date Value Ref Range Status   10/08/2022 3.0 2.5 - 4.5 mg/dL Final   10/07/2022 2.8 2.5 - 4.5 mg/dL Final   10/06/2022 2.6 2.5 - 4.5 mg/dL Final     Recent Labs     10/12/22  1951   HCO3 39.3*   BE 9.7*   O2SAT 95.3       RADIOLOGY:  XR CHEST (2 VW)   Final Result   Cardiomegaly with persistent interstitial and alveolar pulmonary edema which   is most pronounced on the right.              IMPRESSION:  Acute on chronic hypoxic and hypercapnic respiratory failure  COPD exacerbation  Bilateral pleural effusions-likely cardiogenic  HFpEF- EF 70-75% on 7/7/22  Chronic atrial fibrillation currently anticoagulated with Eliquis  Severe pulmonary hypertension-RVSP 60 mmHg on 7/7/2022  Obstructive sleep apnea on CPAP  Morbid obesity-BMI 41.21    PLAN:  Change BiPAP settings to 22/6, titrate FiO2 to keep SPO2 between 88 and 92% and obtain ABG 90 minutes after changing BiPAP settings  ABGs as needed  Patient should remain on BiPAP at least overnight  DuoNeb every 4 around-the-clock, Estefani, and Alice-Medrol  Rocephin and Zithromax  Avoid opiates and benzodiazepines  Continue Eliquis  Continuous pulse oximetry  Strict intake and output  Cardiology consulted      Electronically signed by RASHARD Lino CNP on 10/12/2022 at 10:10 PM

## 2022-10-13 NOTE — CONSULTS
Inpatient Cardiology Consultation      Reason for Consult:  CHF    Consulting Physician: Dr. Radha Joiner    Requesting Physician:  Dr. Soto Flores    Date of Consultation: 10/13/2022    History of Present Illness:   Mrs. Gretchen Herndon is a 66year old lady who is known to Dr. Epifanio Cuevas service through inpatient consultation only. She is known to have PAF, nonobstructive CAD, and chronic HFpEF. She has had multiple admissions this year, but has not followed with us as an outpatient. Her past admission was from October 5 to October 10, due to pneumonia, SHANTANU, and sepsis with pyelonephritis. She resides at an extended care facility and was sent back to the ED on October 12 due to recurrent dyspnea; CXR was suggestive of pulmonary edema, BNP was 7715 and she was treated with IV Lasix. ABGs showed pH of 7.27, pCO2 of 85.6 and she was placed on Bipap. Currently, she denies chest pain but reports lower back pain. She remains on Bipap and appears ill but in no acute distress. Past Medical History:    08/10/11 Lexiscan MPS [facility]):  Normal Lexiscan portion. No evidence for inducible ischemia. No previous myocardial infarction. Ejection fraction 77%. Lexiscan MPS 04/25/2014: Reversible inferolateral wall perfusion defect. Finding suggests left ventricular myocardium at risk for stress-induced ischemia. EF >70%. TTE 04/27/2014 (Dr. Monica Michel): Normal left ventricle size and systolic function. No wall motion abnormalities. Mild concentric left ventricular hypertrophy. Ejection fraction is visually estimated at >55%. Normal right ventricular size and function. Aortic root is sclerotic and calcified  Cardiac Catheterization (Dr. Monica Michel) 04/28/2014: Left main:  The left main artery is a short vessel which did not appear to have any significant angiographic disease. Left anterior descending:   The left anterior descending artery is a moderate-sized vessel which has minor luminal irregularities in its middle segment but without any significant angiographic luminal narrowing. The diagonal branches also did not appear to have any significant disease. Left circumflex: The left circumflex is a large, codominant vessel which did not appear to have any significant angiographic disease. Right coronary artery:  The right coronary artery is a moderate-sized codominant vessel which did not appear to have any significant angiographic disease. LEFT VENTRICULOGRAPHY:  The left ventricle is normal in size and contractility with an estimated ejection fraction of 60% to 65%. There was no mitral regurgitation. RIGHT FEMORAL ARTERY ANGIOGRAPHY:  The right common femoral artery and the proximal segments of the right superficial femoral artery and the right profunda did not appear to have any significant disease. TTE 02/07/2021 (Dr. Gayla Wiley): Normal left ventricle size and systolic function. Ejection fraction is visually estimated at 65-70%. No regional wall motion abnormalities seen. Moderate concentric left ventricular hypertrophy. Normal right ventricular size and function. No systolic mitral regurgitation noted. No hemodynamically significant aortic stenosis is present. Unable to estimate PA systolic pressure. No evidence for hemodynamically significant pericardial effusion. Valves were not well visualized, please consider PAT if clinical suspicion for endocarditis is high  Lexiscan MPS 01/10/2022: ECG during the infusion did not change. The myocardial perfusion imaging was abnormal. The abnormality was a large sized, moderate fixed defect involving the inferior and inferolateral wall suggestive prior infarct without any reversibility. There was also a small sized mild perfusion defect involving the mid to distal anterior wall suggestive ischemia. Overall left ventricular systolic function was normal without regional wall motion abnormalities. No transient ischemic dilatation.  High risk general pharmacologic stress test.  Cardiac catheterization 1/11/2022: CONCLUSIONS:  Coronary artery disease: Left main. No significant disease. LAD. Heavily calcified proximal and mid vessel with 50% mid vessel stenosis. 60% proximal stenosis of a moderate size first diagonal branch. LCX. 50% ostial narrowing of the AV groove branch in the mid vessel which is a bifurcation lesion with a large marginal branch which itself did not appear to have any significant disease. The AV groove branch of the left circumflex continues to provide a posterior descending artery branch and the posterolateral branch (codominant vessel). RCA. Codominant vessel with no significant angiographic disease. Normal left ventricular size with hyperdynamic left ventricular systolic function with an estimated ejection fraction of 75%. Systemic hypertension. Elevated left ventricular end-diastolic pressure (18)  8. Echocardiogram 1/11/2022:  Left ventricle is normal in size. Moderate concentric left ventricular hypertrophy. No     regional wall motion abnormalities seen. Ejection fraction is visually estimated at 70+/-5%. There is doppler evidence of stage I diastolic dysfunction. Mildly dilated right ventricle. Right ventricle global systolic function is normal ( TAPSE = 1.8 ). Normal size atria. No significant valvular abnormalities noted. 9. Morbid obesity. 10. Diabetes mellitus. 11. Hyperlipidemia. 12. Hypertension. 13. Asthma. 14. SERENA with noncompliance with Cpap  15. Stomach ulcers. 10/29/13, status post EGD and total colonoscopy (Dr. Diego Pressley):  Normal esophagus, normal stomach, normal duodenum. Colonoscopy was completel normal to the ileocecal valve  16. Anxiety   17. Status post bilateral breast reduction surgery. 18. Right breast cancer, status post right lumpectomy with radiation therapy. 19. Status post tonsillectomy and adenoidectomy. 20. Status post tubal ligation. 21. Status post right tube and ovary surgical removal.  22. Status post cholecystectomy. 23. Status post surgical removal of adhesions. 24. Status post excision of sebaceous cyst from abdomen. 25. Status post  release of hammertoes bilaterally. 26. Status post lumbar laminectomy for herniated L4-L5 disk. 27. 08/03/13, bone biopsy:  Ulcerations with retrograde joint contracture of the proximal interphalangeal joint, 2nd digit of right and 3rd digit of left. Status post deep soft tissue cultures with known biopsies and correction of deformity of the 2nd digit of the right and 3rd digit of left (Dr. Torres Bledsoe). 28. Iron-deficiency anemia. 29. Admitted in March 2022 with asthma exacerbation   30. Admitted in April 2022 with mechanical fall and rapid atrial fibrillation. She was started on Eliquis, beta blocker was titrated and she had successful PAT guided cardioversion. 32. PAT 4/18/22: Normal left ventricle size and systolic function. Ejection fraction is visually estimated at 60%. No regional wall motion abnormalities seen. Moderately dilated left atrium. No evidence of thrombus within left atrium or appendage. Agitated saline injected for shunt evaluation. No evidence of patent foramen ovale on bubble study. NAIF emptying velocity 18 cm/sec. Normal right ventricular size and function. Mild mitral regurgitation is present. No hemodynamically significant aortic stenosis is present. Mild tricuspid regurgitation. RVSP is 32 mmHg. Normal estimated PA systolic pressure. No evidence for hemodynamically significant pericardial effusion  32. Admitted in May 2022 with acute HFpEF and rapid atrial fibrillation. S/p successful DCCV 5/16/2022  33. Right toe amputation June 2022   34. Admitted in July 2022 with acute hypoxic respiratory failure, requiring intubation and hypotension, treated with Levophed. She had witnessed seizure activity which was thought to be due to hypoxia. She was seen in consultation by Dr. Nichol Horta due to rapid AF and started on Amiodarone.  She also received digibind for digoxin toxicity. She had chest tube inserted for right pleural effusions. Due to anemia she was transfused and had EGD 7/19/2022: moderate diffuse gastritis and duodenitis. She was also followed by hematology due to pancytopenia   35. TTE 7/07/2022: Technically difficult study - limited visualization. Micro-bubble contrast injected to enhance left ventricular visualization. Normal left ventricular size and systolic function. Ejection fraction is visually estimated at 70-75%. Indeterminate diastolic function. No regional wall motion abnormalities seen. Mild left ventricular concentric hypertrophy noted. Moderately dilated right ventricle with reduced function. Biatrial dilation. Mild tricuspid regurgitation. RVSP is at least 60 mmHg. Prominent pericardial fat pad. No definitive evidence of pericardial effusion. 39. Admitted in 8/2022 with rapid atrial fibrillation and acute hypoxic respiratory failure. She had right side thoracentesis for 900 mls on 8/26/2022. She was diuresed with IV Bumex but I/O incomplete. 40. Admitted 10/05/2022 with dyspnea and hypoxia, was admitted to the iCU for sepsis, pneumonia, and respiratory failure. She developed rapid AF (beta blockers had been held due to hypotension) and was treated with IV Cardizem. She was seen in consultation by Dr. Tu Hebert due to AF and acute heart failure: diuretics were resumed and fluids stopped. Medications Prior to Admit:  Prior to Admission medications    Medication Sig Start Date End Date Taking? Authorizing Provider   LORazepam (ATIVAN) 0.5 MG tablet Take 1 tablet by mouth every 8 hours as needed for Anxiety for up to 30 days.  10/10/22 11/9/22  Margurite Quick, DO   apixaban (ELIQUIS) 5 MG TABS tablet Take 1 tablet by mouth 2 times daily for 6 doses 10/10/22 10/13/22  Margurite Quick, DO   metoprolol succinate (TOPROL XL) 100 MG extended release tablet Take 1 tablet by mouth in the morning and at bedtime 10/10/22   Margurite Quick, DO furosemide (LASIX) 40 MG tablet Take 1 tablet by mouth daily 10/11/22   Shelli Rinaldi DO   polyethylene glycol (GLYCOLAX) 17 g packet Take 17 g by mouth daily as needed for Constipation 10/10/22 11/9/22  Shelli Rinaldi DO   sulfamethoxazole-trimethoprim (BACTRIM DS;SEPTRA DS) 800-160 MG per tablet Take 1 tablet by mouth 2 times daily for 5 days 10/10/22 10/15/22  Shelli Rinaldi DO   loperamide (IMODIUM) 2 MG capsule Take 2 mg by mouth 4 times daily as needed for Diarrhea    Historical Provider, MD   zolpidem (AMBIEN) 5 MG tablet Take 5 mg by mouth nightly.     Historical Provider, MD   OXYGEN Inhale 2 L into the lungs continuous    Historical Provider, MD   docusate sodium (COLACE, DULCOLAX) 100 MG CAPS Take 100 mg by mouth 2 times daily as needed for Constipation 9/8/22   Brittany Liao MD   insulin glargine (LANTUS) 100 UNIT/ML injection vial Inject 13 Units into the skin 2 times daily 9/8/22   Brittany Liao MD   atorvastatin (LIPITOR) 20 MG tablet Take 20 mg by mouth nightly    Historical Provider, MD   benzoin compound solution Apply 1 Applicatorful topically nightly Apply topically to right toe    Historical Provider, MD   ipratropium-albuterol (DUONEB) 0.5-2.5 (3) MG/3ML SOLN nebulizer solution Inhale 1 vial into the lungs 4 times daily    Historical Provider, MD   miconazole (MICOTIN) 2 % powder Apply 1 each topically 2 times daily Apply topically under breasts twice daily    Historical Provider, MD   insulin aspart (NOVOLOG) 100 UNIT/ML injection vial Inject 0-10 Units into the skin 3 times daily (before meals) Blood Glucose 140 - 199 = 1 Unit  Blood Glucose 200 - 249 = 2 Units  Blood Glucose 250 - 299 = 4 Units  Blood Glucose 300 - 349 = 6 Units  Blood Glucose 350 - 399 = 8 Units  Blood Glucose 400+  = 10 Units    Historical Provider, MD   pantoprazole (PROTONIX) 40 MG tablet Take 40 mg by mouth every morning    Historical Provider, MD   promethazine (PHENERGAN) 25 MG tablet Take 25 mg by mouth every 6 hours as needed for Nausea    Historical Provider, MD   mirtazapine (REMERON) 15 MG tablet Take 15 mg by mouth nightly    Historical Provider, MD   budesonide-formoterol (SYMBICORT) 160-4.5 MCG/ACT AERO Inhale 2 puffs into the lungs 2 times daily    Historical Provider, MD   acetaminophen (TYLENOL) 325 MG tablet Take 650 mg by mouth every 4 hours as needed for Pain or Fever    Historical Provider, MD   metoprolol tartrate (LOPRESSOR) 25 MG tablet Take 0.5 tablets by mouth in the morning and 0.5 tablets before bedtime. 7/27/22 9/8/22  Tevin Michel MD   carbidopa-levodopa (SINEMET CR)  MG per extended release tablet Take 2 tablets by mouth in the morning and 2 tablets at noon and 2 tablets in the evening. 7/23/22   Tevin Michel MD   rOPINIRole (REQUIP) 1 MG tablet Take 1 tablet by mouth in the morning and 1 tablet at noon and 1 tablet before bedtime. 7/23/22   Tevin Michel MD   amiodarone (CORDARONE) 200 MG tablet Take 1 tablet by mouth in the morning. 7/24/22 9/8/22  Tevin Michel MD   budesonide (PULMICORT) 0.5 MG/2ML nebulizer suspension Take 2 mLs by nebulization in the morning and 2 mLs in the evening. 7/23/22 9/8/22  Tevin Michel MD   insulin [facility]) 100 UNIT/ML injection vial Inject 5 Units into the skin nightly 7/23/22 9/8/22  Tevin Michel MD   QUEtiapine (SEROQUEL) 25 MG tablet Take 1 tablet by mouth in the morning and 1 tablet before bedtime. 7/23/22 9/8/22  Tevin Michel MD   sucralfate (CARAFATE) 1 GM tablet Take 1 tablet by mouth in the morning and 1 tablet at noon and 1 tablet in the evening and 1 tablet before bedtime.  7/20/22   Leyla Gaines DO   divalproex (DEPAKOTE) 250 MG DR tablet Take 250 mg by mouth 2 times daily  7/23/22  Historical Provider, MD   ferrous sulfate (IRON 325) 325 (65 Fe) MG tablet Take 325 mg by mouth daily (with breakfast)  Patient not taking: Reported on 7/7/2022 7/23/22  Historical Provider, MD   aspirin EC 81 MG EC tablet Take 1 tablet by mouth daily 5/17/22   [facility], MD   montelukast (SINGULAIR) 10 MG tablet Take 10 mg by mouth nightly    Historical Provider, MD       Current Medications:    Current Facility-Administered Medications: sodium chloride flush 0.9 % injection 5-40 mL, 5-40 mL, IntraVENous, 2 times per day  sodium chloride flush 0.9 % injection 5-40 mL, 5-40 mL, IntraVENous, PRN  0.9 % sodium chloride infusion, , IntraVENous, PRN  ondansetron (ZOFRAN-ODT) disintegrating tablet 4 mg, 4 mg, Oral, Q8H PRN **OR** ondansetron (ZOFRAN) injection 4 mg, 4 mg, IntraVENous, Q6H PRN  polyethylene glycol (GLYCOLAX) packet 17 g, 17 g, Oral, Daily PRN  acetaminophen (TYLENOL) tablet 650 mg, 650 mg, Oral, Q6H PRN **OR** acetaminophen (TYLENOL) suppository 650 mg, 650 mg, Rectal, Q6H PRN  methylPREDNISolone sodium (SOLU-MEDROL) injection 40 mg, 40 mg, IntraVENous, Q6H **FOLLOWED BY** [START ON 10/15/2022] predniSONE (DELTASONE) tablet 40 mg, 40 mg, Oral, Daily  apixaban (ELIQUIS) tablet 5 mg, 5 mg, Oral, BID  atorvastatin (LIPITOR) tablet 20 mg, 20 mg, Oral, Nightly  aspirin EC tablet 81 mg, 81 mg, Oral, Daily  carbidopa-levodopa (SINEMET CR)  MG per extended release tablet 2 tablet, 2 tablet, Oral, TID  docusate sodium (COLACE) capsule 100 mg, 100 mg, Oral, BID PRN  insulin glargine (LANTUS) injection vial 13 Units, 13 Units, SubCUTAneous, BID  ipratropium-albuterol (DUONEB) nebulizer solution 3 mL, 1 vial, Inhalation, Q4H PRN  metoprolol succinate (TOPROL XL) extended release tablet 100 mg, 100 mg, Oral, BID  LORazepam (ATIVAN) tablet 0.5 mg, 0.5 mg, Oral, Q8H PRN  loperamide (IMODIUM) capsule 2 mg, 2 mg, Oral, 4x Daily PRN  mirtazapine (REMERON SOL-TAB) disintegrating tablet 15 mg, 15 mg, Oral, Nightly  montelukast (SINGULAIR) tablet 10 mg, 10 mg, Oral, Nightly  pantoprazole (PROTONIX) tablet 40 mg, 40 mg, Oral, QAM  promethazine (PHENERGAN) tablet 25 mg, 25 mg, Oral, Q6H PRN  rOPINIRole (REQUIP) tablet 1 mg, 1 mg, Oral, TID  sucralfate (CARAFATE) tablet 1 g, 1 g, Oral, 4x Daily  bumetanide (BUMEX) injection 1 mg, 1 mg, IntraVENous, BID  insulin lispro (HUMALOG) injection vial 0-16 Units, 0-16 Units, SubCUTAneous, TID WC  insulin lispro (HUMALOG) injection vial 0-4 Units, 0-4 Units, SubCUTAneous, Nightly  Arformoterol Tartrate (BROVANA) nebulizer solution 15 mcg, 15 mcg, Nebulization, BID **AND** [DISCONTINUED] budesonide (PULMICORT) nebulizer suspension 500 mcg, 0.5 mg, Nebulization, BID  ipratropium-albuterol (DUONEB) nebulizer solution 1 ampule, 1 ampule, Inhalation, Q4H  cefTRIAXone (ROCEPHIN) 1,000 mg in sterile water 10 mL IV syringe, 1,000 mg, IntraVENous, Q24H  azithromycin (ZITHROMAX) 500 mg in sodium chloride 0.9 % 250 mL IVPB ([id number]), 500 mg, IntraVENous, Q24H    Allergies:  Ciprofloxacin and Codeine    Social History:    Social History     Socioeconomic History    Marital status:       Spouse name: Not on file    Number of children: Not on file    Years of education: Not on file    Highest education level: Not on file   Occupational History    Not on file   Tobacco Use    Smoking status: Never    Smokeless tobacco: Never   Vaping Use    Vaping Use: Never used   Substance and Sexual Activity    Alcohol use: No    Drug use: No    Sexual activity: Never   Other Topics Concern    Not on file   Social History Narrative    Not on file     Social Determinants of Health     Financial Resource Strain: Not on file   Food Insecurity: Not on file   Transportation Needs: Not on file   Physical Activity: Not on file   Stress: Not on file   Social Connections: Not on file   Intimate Partner Violence: Not on file   Housing Stability: Not on file       Family History:   Family History   Problem Relation Age of Onset    Cancer Mother         Lung Ca    Cancer Father         lung Ca    Diabetes Sister     Heart Disease Sister     Seizures Son     Other Brother         sepsis Review of Systems: Unable to obtain full review of systems from her due to both cognitive and respiratory status. Please see HPI. Physical Exam:   BP Range since admission: 97/54 to 120/54  BP (!) 102/52   Pulse 88   Temp 98 °F (36.7 °C) (Oral)   Resp 20   Ht 5' (1.524 m)   Wt 211 lb (95.7 kg)   SpO2 100%   BMI 41.21 kg/m²   CONST:  Well developed, well nourished elderly lady who appears of stated age. Awake, alert and cooperative. Appears ill but in rk acute distress  HEENT:   Head- Normocephalic, atraumatic   Eyes- Conjunctivae pink  Throat- Oral mucosa pink and moist  Neck-  No stridor or carotid bruit. Thick neck. CHEST: Chest symmetrical and non-tender to palpation. + accessory muscle use   RESPIRATORY:  Breath sounds diminished in bases with faint expiratory wheezing   CARDIOVASCULAR:     Heart Ausculation-Irregular rhythm, no apparent murmur or rub   PV: No lower extremity edema. No varicosities. Feet warm to touch   ABDOMEN: Soft, non-tender to light palpation. Bowel sounds present. No palpable masses   MS: Good muscle strength and tone. No atrophy or abnormal movements. : Deferred  SKIN: Warm and dry   NEURO / PSYCH: Oriented to person and place. Unable to answer all questions.      Telemetry: AF, ventricular rates 70s to 80s     ECG 10/12/2022: AF, 74 bpm with low QRS voltage, poor R wave progression       Intake/Output Summary (Last 24 hours) at 10/13/2022 0808  Last data filed at 10/13/2022 0434  Gross per 24 hour   Intake 250 ml   Output --   Net 250 ml       Labs:   CBC:   Recent Labs     10/12/22  1659 10/13/22  0546   WBC 8.2 5.4   HGB 10.6* 10.0*   HCT 36.2 34.3    233     BMP:   Recent Labs     10/12/22  1659 10/13/22  0546    141   K 4.3 4.2   CO2 39* 32*   BUN 19 19   CREATININE 0.9 0.9   LABGLOM >60 >60   CALCIUM 9.2 8.1*       HgA1c:   Lab Results   Component Value Date    LABA1C 6.1 (H) 08/24/2022     No results found for: EAG  proBNP:   Recent Labs 10/12/22  1659   PROBNP 7,115*       CARDIAC ENZYMES:  Recent Labs     10/12/22  1659   TROPHS 34*      FASTING LIPID PANEL:  Lab Results   Component Value Date/Time    CHOL 95 05/12/2022 01:44 AM    HDL 38 05/12/2022 01:44 AM    LDLCALC 38 05/12/2022 01:44 AM    TRIG 93 05/12/2022 01:44 AM     LIVER PROFILE:  Recent Labs     10/13/22  0546   AST 12   ALT <5   LABALBU 3.1*     Echocardiogram:   TTE 7/7/22 Telly   Summary   Technically difficult study - limited visualization. Micro-bubble contrast injected to enhance left ventricular visualization. Normal left ventricular size and systolic function. Ejection fraction is visually estimated at 70-75%. Indeterminate diastolic function. No regional wall motion abnormalities seen. Mild left ventricular concentric hypertrophy noted. Moderately dilated right ventricle with reduced function. Biatrial dilation. Mild tricuspid regurgitation. RVSP is at least 60 mmHg. Prominent pericardial fat pad. No definitive evidence of pericardial effusion. Stress test:    Pharm stress 1/2022  Gated SPECT left ventricular ejection fraction was calculated to be   74% with normal wall motion and thickening. TID ratio 1.08. Impression   1. ECG during the infusion did not change. 2.  The myocardial perfusion imaging was abnormal.   3.  The abnormality was a large sized, moderate fixed defect involving   the inferior and inferolateral wall suggestive prior infarct without   any reversibility. There was also a small sized mild perfusion defect   involving the mid to distal anterior wall suggestive ischemia. 4.  Overall left ventricular systolic function was normal without   regional wall motion abnormalities. 5.  No transient ischemic dilatation. 6.  High risk general pharmacologic stress test.      Cardiac catheterization:   [facility] 1/11/22 Diane  CONCLUSIONS:  1. Coronary artery disease:  A. Left main. No significant disease. B.  LAD. Heavily calcified proximal and mid vessel with 50% mid vessel stenosis. 60% proximal stenosis of a moderate size first diagonal branch. C.  LCX. 50% ostial narrowing of the AV groove branch in the mid vessel which is a bifurcation lesion with a large marginal branch which itself did not appear to have any significant disease. The AV groove branch of the left circumflex continues to provide a posterior descending artery branch and the posterolateral branch (codominant vessel). D.  RCA. Codominant vessel with no significant angiographic disease. 2.  Normal left ventricular size with hyperdynamic left ventricular systolic function with an estimated ejection fraction of 75%. 3.  Systemic hypertension. 4.  Elevated left ventricular end-diastolic pressure. Assessment:   Acute on chronic hypoxic/hypercapnic respiratory failure  Acute on chronic HFpEF/right heart failure with evidence of pulmonary hypertension   Persistent atrial fibrillation with RVR in setting of held beta-blocker/sepsis. AC apixaban  Diagnosed 4/2022  PAT/DCC 4/18/2022, recurrence noted 5/2022  [facility] 5/16/2022 with subsequent recurrence noted 7/2022  Mild nonobstructive coronary artery disease  Minimally elevated hs-cTnT: 34 ng/L likely chronic myocardial injury. History of digoxin toxicity 7/2022 (level 3.7, treated with digoxin immune Clint); digoxin resumed 8/2022; level on current admission 10/5/2022 2.2  History of bradycardia, beta-blocker and amiodarone previously discontinued due to bradycardia  History of hypertension. Borderline hypotension this admission   Hyperlipidemia  Type 2 diabetes   SERENA  BMI 41.21  Chronic Anemia   Moderate gastritis and duodenitis by EGD 7/2022  Recent admission for sepsis/pyelonephritis and SHANTANU.        Plan:     Continue IV diuresis: close monitoring of BMP, I/O, weights  May need higher maintenance diuretic at discharge   Low sodium diet  Stop aspirin as she is anticoagulated with Eliquis  Increase to high intensity statin  Continue beta blocker as blood pressure allows      The above assessment and treatment plan was made in collaboration with Dr. Willa Fontenot. Electronically signed by RASHARD Kingston on 10/13/2022 at 8:08 [id number] [facility] Cardiology Consult       Main Sotom[place]or    I have personally participated in a face-to-face and personally obtained history and performed physical exam on the date of service. I reviewed chart, vitals, labs and radiologic studies. I also participated in medical decision making with RASHARD Kingston on the date of service All of the assessments and recommendations are from me and I agree with all of the pertinent clinical information, assessment and treatment plan. I have reviewed and edited the note above based on my findings during my history, exam, and decision making. Please see my additional contributions to the history, physical exam, assessment, and recommendations below. HISTORY OF PRESENT ILLNESS:     Reviewed, as above      Past medical history:  Reviewed, as above. Past surgical history:  Reviewed, as above. Current medications:  Reviewed, as above    Allergies:  Reviewed, as above    Social history:  Reviewed, as above    Family medical history:  Reviewed, as above. REVIEW OF SYSTEMS:   Reviewed, as above. PHYSICAL EXAM:   CONSTITUTIONAL: Sleepy, no apparent distress, and appears stated age  EYES:  lids and lashes normal and pupils equal, round and reactive to light, anicteric sclerae  HEAD:  normocepalic, without obvious abnormality, atraumatic, pink, moist mucous membranes.   NECK:  Supple, symmetrical, trachea midline, no adenopathy, thyroid symmetric, not enlarged and no tenderness, skin normal  HEMATOLOGIC/LYMPHATICS:  no cervical lymphadenopathy and no supraclavicular lymphadenopathy  LUNGS:  No increased work of breathing, good air exchange, clear to auscultation bilaterally, no crackles or wheezing  CARDIOVASCULAR: Normal apical impulse, irregularly irregular, normal S1 and S2, no S3 or S4, and no murmur noted and no JVD, no carotid bruit, no pedal edema, good carotid upstroke bilaterally. ABDOMEN:  Soft, nontender, no masses, no hepatomegaly or splenomegaly, BS+  CHEST: nontender to palpation, expands symmetrically  MUSCULOSKELETAL:  No clubbing no cyanosis. there is no redness, warmth, or swelling of the joints  NEUROLOGIC: Sleepy  SKIN:  no bruising or bleeding, normal skin color, texture, turgor and no redness, warmth, or swelling    /62   Pulse (!) 106   Temp 97.9 °F (36.6 °C) (Oral)   Resp 16   Ht 5' (1.524 m)   Wt 211 lb (95.7 kg)   SpO2 95%   BMI 41.21 kg/m²       I/O last 3 completed shifts: In: 250 [IV Piggyback:250]  Out: -   No intake/output data recorded. DATA:   I personally reviewed the admission EKG with the following interpretation: Atrial fibrillation, controlled ventricular response, possible old septal wall MI age undetermined    ECHO: 7/7/2022,Summary   Technically difficult study - limited visualization. Micro-bubble contrast injected to enhance left ventricular visualization. Normal left ventricular size and systolic function. Ejection fraction is visually estimated at 70-75%. Indeterminate diastolic function. No regional wall motion abnormalities seen. Mild left ventricular concentric hypertrophy noted. Moderately dilated right ventricle with reduced function. Biatrial dilation. Mild tricuspid regurgitation. RVSP is at least 60 mmHg. Prominent pericardial fat pad. No definitive evidence of pericardial effusion. Stress Test: 1/10/2022,  ECG during the infusion did not change. 2.  The myocardial perfusion imaging was abnormal.   3.  The abnormality was a large sized, moderate fixed defect involving   the inferior and inferolateral wall suggestive prior infarct without   any reversibility.  There was also a small sized mild perfusion defect   involving the mid to distal anterior wall suggestive ischemia. 4.  Overall left ventricular systolic function was normal without   regional wall motion abnormalities. 5.  No transient ischemic dilatation. 6.  High risk general pharmacologic stress test.         Angiography: 1/11/2022,CONCLUSIONS:  1. Coronary artery disease:  A. Left main. No significant disease. B.  LAD. Heavily calcified proximal and mid vessel with 50% mid vessel  stenosis. 60% proximal stenosis of a moderate size first diagonal  branch. C.  LCX. 50% ostial narrowing of the AV groove branch in the mid vessel  which is a bifurcation lesion with a large marginal branch which itself  did not appear to have any significant disease. The AV groove branch of  the left circumflex continues to provide a posterior descending artery  branch and the posterolateral branch (codominant vessel). D.  RCA. Codominant vessel with no significant angiographic disease. 2.  Normal left ventricular size with hyperdynamic left ventricular  systolic function with an estimated ejection fraction of 75%. 3.  Systemic hypertension. 4.  Elevated left ventricular end-diastolic pressure.     Cardiology Labs: BMP:    Lab Results   Component Value Date/Time     10/13/2022 05:46 AM    K 4.2 10/13/2022 05:46 AM    CL 99 10/13/2022 05:46 AM    CO2 32 10/13/2022 05:46 AM    BUN 19 10/13/2022 05:46 AM     CMP:    Lab Results   Component Value Date/Time     10/13/2022 05:46 AM    K 4.2 10/13/2022 05:46 AM    CL 99 10/13/2022 05:46 AM    CO2 32 10/13/2022 05:46 AM    BUN 19 10/13/2022 05:46 AM    PROT 5.6 10/13/2022 05:46 AM     CBC:    Lab Results   Component Value Date/Time    WBC 5.4 10/13/2022 05:46 AM    RBC 3.77 10/13/2022 05:46 AM    HGB 10.0 10/13/2022 05:46 AM    HCT 34.3 10/13/2022 05:46 AM    MCV 91.0 10/13/2022 05:46 AM    RDW 15.8 10/13/2022 05:46 AM     10/13/2022 05:46 AM     PT/INR:  No results found for: PTINR  PT/INR Warfarin:  No components found for: Sridhar Leaks  PTT:    Lab Results   Component Value Date/Time    APTT 33.9 07/20/2022 04:54 AM     PTT Heparin:  No components found for: APTTHEP  Magnesium:    Lab Results   Component Value Date/Time    MG 1.9 10/08/2022 04:53 AM     TSH:    Lab Results   Component Value Date/Time    TSH 8.350 09/27/2022 04:51 AM     TROPONIN:  No components found for: TROP  BNP:  No results found for: BNP  FASTING LIPID PANEL:    Lab Results   Component Value Date/Time    CHOL 95 05/12/2022 01:44 AM    HDL 38 05/12/2022 01:44 AM    TRIG 93 05/12/2022 01:44 AM     CT CHEST W CONTRAST   Final Result   1. Interlobular septal thickening in the upper lungs, bilateral pleural   effusions greater on the right, 4 chamber cardiac enlargement, pericardial   effusion and hepatosplenomegaly. Trace right upper quadrant perihepatic   ascites. Findings suggest volume overload/CHF. 2.  Partial passive atelectasis of the lower lobes, right middle lobe and   lingular segment. 3.  Prominent main pulmonary artery, consider pulmonary artery hypertension. No filling defects in the pulmonary artery to suggest embolism. 4.  Postop changes in the right axilla and right breast.         XR CHEST (2 VW)   Final Result   Cardiomegaly with persistent interstitial and alveolar pulmonary edema which   is most pronounced on the right. I have personally reviewed the laboratory, cardiac diagnostic and radiographic testing as outlined above:    IMPRESSION:  1. Acute exacerbation of congestive heart failure: Decompensated, will adjust diuretic therapy  2. Acute hypoxic respiratory failure: Suspect multifactorial, as above. 3.  Atrial fibrillation with RVR: Rate is better controlled on current treatment, will continue  4. Elevated troponin: Secondary to acute hypoxic ventilatory failure  5. CAD: Nonobstructive involving LAD and left circumflex artery  6. Tricuspid valve regurgitation: Mild  7.   Pulmonary hypertension: Moderate  8. Hypertension: Controlled  9. Hyperlipidemia  10. Type 2 diabetes mellitus  11. History of beta-blocker and amiodarone induced bradycardia  12. History of digitoxicity    RECOMMENDATIONS:   Will continue current treatment  Ventilatory support as needed  Basic metabolic panel and CBC in the a.m. Further cardiac recommendations will be forthcoming pending her clinical course and diagnostic test findings    I have reviewed my findings and recommendations with patient    Thank you for the consult  Electronically signed by Ameya Lofton MD on 10/13/2022 at 4:33 PM    All of the above was discussed with the patient  reviewing the above stated recommendations. And a total of >50% of that time involved face-to-face time providing counseling and or coordination of care with the other providers, reviewing records/tests, counseling/education of the patient, ordering medications/tests/procedures, coordinating care, and documenting clinical information in the EHR. I also discussed the differential diagnosis and all of the proposed management plans with the patient a. NOTE: This report was transcribed using voice recognition software.  Every effort was made to ensure accuracy; however, inadvertent computerized transcription errors may be present

## 2022-10-13 NOTE — H&P
HISTORY AND PHYSICAL             Date: 10/13/2022        Patient Name: Bertha Paredes     YOB: 1950      Age:  66 y.o. Chief Complaint     Chief Complaint   Patient presents with    Shortness of Breath     Short of breath since this morning also nauseated          History Obtained From   patient    History of Present Illness    66 y female presented to the ED for evaluation of shortness of breath. Patient states that her symptoms started this morning. She has noted that the shortness of breath is constant. Present with exertion and with rest.  No associated fever or chills. She has a cough that is nonproductive. States that its not unusual for her. No associated chest pain. She did have an episode of nausea and vomiting this morning but that has since resolved. No associated abdominal pain or diarrhea. No blood in stool or urine. No blood in the emesis. States that she is not aware of any sick contacts. No associated dizziness or lightheadedness. Symptoms moderate in severity and have been persistent. Gradually worsening. She does report history of congestive heart failure and states that she is on diuretic therapy. Urinating normally. Reports that she does wear supplemental oxygen at the nursing home. She is not sure how much oxygen she uses. Workup shows persistent CHF, hypercapnia, recurrent pleural effusion. Will be admitted.          Past Medical History     Past Medical History:   Diagnosis Date    A-fib [facility])     Acute on chronic congestive heart failure (HCC)     Anxiety     Asthma     CAD (coronary artery disease) 01/21/2016    Cancer ([facility] [address]  breast ca 2006    right lumpectomy    Chronic kidney disease     nephrolithiasis    COPD exacerbation ([facility] [address] 10/12/2022    Depression     Diabetes mellitus ([facility] [address]     H/O mammogram     Hx MRSA infection     toe infection january 2012    Hyperlipidemia     Hypertension     Lateral epicondylitis     Morbid obesity (HCC)     SERENA on CPAP Oxygen dependent     Parkinson's disease ([facility] [address]     Tubal ligation status         Past Surgical History     Past Surgical History:   Procedure Laterality Date    BREAST LUMPECTOMY      BREAST REDUCTION SURGERY      CARDIAC CATHETERIZATION  4/28/2014    Dr. Mariano Betancourt  01/11/2022    Dr. Nik Shaver  7/29/15    CT GUIDED CHEST TUBE  7/8/2022    CT GUIDED CHEST TUBE 7/8/2022 MD HALLEY Martinez CT    ENDOSCOPY, COLON, DIAGNOSTIC  7/19/15    GALLBLADDER SURGERY      LUMBAR LAMINECTOMY      TOE AMPUTATION      TONSILLECTOMY      UPPER GASTROINTESTINAL ENDOSCOPY      UPPER GASTROINTESTINAL ENDOSCOPY N/A 7/19/2022    EGD ESOPHAGOGASTRODUODENOSCOPY performed by Scottie Jade MD at [address] [facility]        Medications Prior to Admission     Prior to Admission medications    Medication Sig Start Date End Date Taking? Authorizing Provider   LORazepam (ATIVAN) 0.5 MG tablet Take 1 tablet by mouth every 8 hours as needed for Anxiety for up to 30 days. 10/10/22 11/9/22  Shelli Rinaldi, DO   apixaban (ELIQUIS) 5 MG TABS tablet Take 1 tablet by mouth 2 times daily for 6 doses 10/10/22 10/13/22  Shelli Rinladi, DO   metoprolol succinate (TOPROL XL) 100 MG extended release tablet Take 1 tablet by mouth in the morning and at bedtime 10/10/22   Shelli Rinaldi, DO   furosemide (LASIX) 40 MG tablet Take 1 tablet by mouth daily 10/11/22   Shelli Rinaldi, DO   polyethylene glycol (GLYCOLAX) 17 g packet Take 17 g by mouth daily as needed for Constipation 10/10/22 11/9/22  Shelli Rinaldi, DO   sulfamethoxazole-trimethoprim (BACTRIM DS;SEPTRA DS) 800-160 MG per tablet Take 1 tablet by mouth 2 times daily for 5 days 10/10/22 10/15/22  Shelli Rinaldi, DO   loperamide (IMODIUM) 2 MG capsule Take 2 mg by mouth 4 times daily as needed for Diarrhea    Historical Provider, MD   zolpidem (AMBIEN) 5 MG tablet Take 5 mg by mouth nightly.     Historical Provider, MD   OXYGEN Inhale 2 L into the lungs continuous    Historical Provider, MD   docusate sodium (COLACE, DULCOLAX) 100 MG CAPS Take 100 mg by mouth 2 times daily as needed for Constipation 9/8/22   Georgie Stewart MD   insulin glargine (LANTUS) 100 UNIT/ML injection vial Inject 13 Units into the skin 2 times daily 9/8/22   Georgie Stewart MD   atorvastatin (LIPITOR) 20 MG tablet Take 20 mg by mouth nightly    Historical Provider, MD   benzoin compound solution Apply 1 Applicatorful topically nightly Apply topically to right toe    Historical Provider, MD   ipratropium-albuterol (DUONEB) 0.5-2.5 (3) MG/3ML SOLN nebulizer solution Inhale 1 vial into the lungs 4 times daily    Historical Provider, MD   miconazole (MICOTIN) 2 % powder Apply 1 each topically 2 times daily Apply topically under breasts twice daily    Historical Provider, MD   insulin aspart (NOVOLOG) 100 UNIT/ML injection vial Inject 0-10 Units into the skin 3 times daily (before meals) Blood Glucose 140 - 199 = 1 Unit  Blood Glucose 200 - 249 = 2 Units  Blood Glucose 250 - 299 = 4 Units  Blood Glucose 300 - 349 = 6 Units  Blood Glucose 350 - 399 = 8 Units  Blood Glucose 400+  = 10 Units    Historical Provider, MD   pantoprazole (PROTONIX) 40 MG tablet Take 40 mg by mouth every morning    Historical Provider, MD   promethazine (PHENERGAN) 25 MG tablet Take 25 mg by mouth every 6 hours as needed for Nausea    Historical Provider, MD   mirtazapine (REMERON) 15 MG tablet Take 15 mg by mouth nightly    Historical Provider, MD   budesonide-formoterol (SYMBICORT) 160-4.5 MCG/ACT AERO Inhale 2 puffs into the lungs 2 times daily    Historical Provider, MD   acetaminophen (TYLENOL) 325 MG tablet Take 650 mg by mouth every 4 hours as needed for Pain or Fever    Historical Provider, MD   metoprolol tartrate (LOPRESSOR) 25 MG tablet Take 0.5 tablets by mouth in the morning and 0.5 tablets before bedtime.  7/27/22 9/8/22  Quinton Buitrago MD   carbidopa-levodopa (SINEMET CR)  MG per extended release tablet Take 2 tablets by mouth in the morning and 2 tablets at noon and 2 tablets in the evening. 7/23/22   Ze Crabtree MD   rOPINIRole (REQUIP) 1 MG tablet Take 1 tablet by mouth in the morning and 1 tablet at noon and 1 tablet before bedtime. 7/23/22   Ze Crabtree MD   amiodarone (CORDARONE) 200 MG tablet Take 1 tablet by mouth in the morning. 7/24/22 9/8/22  Ze Crabtree MD   budesonide (PULMICORT) 0.5 MG/2ML nebulizer suspension Take 2 mLs by nebulization in the morning and 2 mLs in the evening. 7/23/22 9/8/22  Ze Crabtree MD   insulin [facility]) 100 UNIT/ML injection vial Inject 5 Units into the skin nightly 7/23/22 9/8/22  Ze Crabtree MD   QUEtiapine (SEROQUEL) 25 MG tablet Take 1 tablet by mouth in the morning and 1 tablet before bedtime. 7/23/22 9/8/22  Ze Crabtree MD   sucralfate (CARAFATE) 1 GM tablet Take 1 tablet by mouth in the morning and 1 tablet at noon and 1 tablet in the evening and 1 tablet before bedtime.  7/20/22   Leyla Gaines DO   divalproex (DEPAKOTE) 250 MG DR tablet Take 250 mg by mouth 2 times daily  7/23/22  Historical Provider, MD   ferrous sulfate (IRON 325) 325 (65 Fe) MG tablet Take 325 mg by mouth daily (with breakfast)  Patient not taking: Reported on 7/7/2022 7/23/22  Historical Provider, MD   aspirin EC 81 MG EC tablet Take 1 tablet by mouth daily 5/17/22   Bala William MD   montelukast (SINGULAIR) 10 MG tablet Take 10 mg by mouth nightly    Historical Provider, MD        Allergies   Ciprofloxacin and Codeine    Social History     Social History       Tobacco History       Smoking Status  Never      Smokeless Tobacco Use  Never              Alcohol History       Alcohol Use Status  No              Drug Use       Drug Use Status  No              Sexual Activity       Sexually Active  Never                    Family History     Family History   Problem Relation Age of Onset    Cancer Mother         Lung Ca    Cancer Father         lung Ca    Diabetes Sister     Heart Disease Sister     Seizures Son     Other Brother         sepsis       Review of Systems   Review of Systems   Respiratory:  Positive for shortness of breath. All other systems reviewed and are negative. Physical Exam   BP (!) 102/52   Pulse 88   Temp 98 °F (36.7 °C) (Oral)   Resp 20   Ht 5' (1.524 m)   Wt 211 lb (95.7 kg)   SpO2 100%   BMI 41.21 kg/m²     Physical Exam  Constitutional:       Appearance: She is obese. She is ill-appearing and toxic-appearing. HENT:      Mouth/Throat:      Mouth: Mucous membranes are dry. Eyes:      Extraocular Movements: Extraocular movements intact. Conjunctiva/sclera: Conjunctivae normal.      Pupils: Pupils are equal, round, and reactive to light. Cardiovascular:      Rate and Rhythm: Normal rate. Rhythm irregular. Pulses: Normal pulses. Heart sounds: Normal heart sounds. Pulmonary:      Effort: Pulmonary effort is normal.      Breath sounds: Rales present. Abdominal:      General: Abdomen is flat. Bowel sounds are normal.      Palpations: Abdomen is soft. Musculoskeletal:      Cervical back: Normal range of motion and neck supple. Right lower leg: Edema present. Left lower leg: Edema present. Neurological:      General: No focal deficit present. Mental Status: She is alert and oriented to person, place, and time. Mental status is at baseline.        Labs      Recent Results (from the past 24 hour(s))   EKG 12 Lead    Collection Time: 10/12/22  4:54 PM   Result Value Ref Range    Ventricular Rate 74 BPM    Atrial Rate 65 BPM    QRS Duration 70 ms    Q-T Interval 374 ms    QTc Calculation (Bazett) 415 ms    R Axis 71 degrees    T Axis 25 degrees   CBC with Auto Differential    Collection Time: 10/12/22  4:59 PM   Result Value Ref Range    WBC 8.2 4.5 - 11.5 E9/L    RBC 3.95 3.50 - 5.50 E12/L    Hemoglobin 10.6 (L) 11.5 - 15.5 g/dL Hematocrit 36.2 34.0 - 48.0 %    MCV 91.6 80.0 - 99.9 fL    MCH 26.8 26.0 - 35.0 pg    MCHC 29.3 (L) 32.0 - 34.5 %    RDW 15.2 (H) 11.5 - 15.0 fL    Platelets 600 707 - 593 E9/L    MPV 10.9 7.0 - 12.0 fL    Neutrophils % 75.8 43.0 - 80.0 %    Immature Granulocytes % 1.7 0.0 - 5.0 %    Lymphocytes % 15.8 (L) 20.0 - 42.0 %    Monocytes % 5.0 2.0 - 12.0 %    Eosinophils % 1.2 0.0 - 6.0 %    Basophils % 0.5 0.0 - 2.0 %    Neutrophils Absolute 6.24 1.80 - 7.30 E9/L    Immature Granulocytes # 0.14 E9/L    Lymphocytes Absolute 1.30 (L) 1.50 - 4.00 E9/L    Monocytes Absolute 0.41 0.10 - 0.95 E9/L    Eosinophils Absolute 0.10 0.05 - 0.50 E9/L    Basophils Absolute 0.04 0.00 - 0.20 A0/O   Basic Metabolic Panel w/ Reflex to MG    Collection Time: 10/12/22  4:59 PM   Result Value Ref Range    Sodium 139 132 - 146 mmol/L    Potassium reflex Magnesium 4.3 3.5 - 5.0 mmol/L    Chloride 93 (L) 98 - 107 mmol/L    CO2 39 (H) 22 - 29 mmol/L    Anion Gap 7 7 - 16 mmol/L    Glucose 207 (H) 74 - 99 mg/dL    BUN 19 6 - 23 mg/dL    Creatinine 0.9 0.5 - 1.0 mg/dL    GFR Non-African American >60 >=60 mL/min/1.73    GFR African American >60     Calcium 9.2 8.6 - 10.2 mg/dL   Troponin    Collection Time: 10/12/22  4:59 PM   Result Value Ref Range    Troponin, High Sensitivity 34 (H) 0 - 9 ng/L   Brain Natriuretic Peptide    Collection Time: 10/12/22  4:59 PM   Result Value Ref Range    Pro-BNP 7,115 (H) 0 - 125 pg/mL   Lactate, Sepsis    Collection Time: 10/12/22  4:59 PM   Result Value Ref Range    Lactic Acid, Sepsis 1.0 0.5 - 1.9 mmol/L   COVID-19, Rapid    Collection Time: 10/12/22  5:48 PM    Specimen: Nasopharyngeal Swab   Result Value Ref Range    SARS-CoV-2, NAAT Not Detected Not Detected   Arterial Blood Gas, Respiratory Only    Collection Time: 10/12/22  7:51 PM   Result Value Ref Range    POC Source Arterial     Pt Temp 37.0     PH (TEMPERATURE CORRECTED) 7.270 (L) 7.350 - 7.450    PCO2 (TEMP CORRECTED) 85.6 (HH) 35.0 - 45.0 mmHg PO2 (TEMP CORRECTED) 93.2 (H) 60.0 - 80.0 mmHg    HCO3 39.3 (H) 22.0 - 26.0 mmol/L    B.E. 9.7 (H) -3.0 - 3.0 mmol/L    O2 Sat 95.3 92.0 - 98.5 %     ID 7,291     DEVICE 17,[id number]     Critical Notification Yes     Delivery Systems Cannula    POCT Glucose    Collection Time: 10/12/22 10:14 PM   Result Value Ref Range    Meter Glucose 242 (H) 74 - 99 mg/dL   Arterial Blood Gas, Respiratory Only    Collection Time: 10/12/22 11:53 PM   Result Value Ref Range    POC Source Arterial     FIO2 25.0     Pt Temp 37.0     PH (TEMPERATURE CORRECTED) 7.386 7.350 - 7.450    PCO2 (TEMP CORRECTED) 57.1 (H) 35.0 - 45.0 mmHg    PO2 (TEMP CORRECTED) 57.4 (L) 60.0 - 80.0 mmHg    HCO3 34.2 (H) 22.0 - 26.0 mmol/L    B.E. 7.9 (H) -3.0 - 3.0 mmol/L    O2 Sat 88.2 (L) 92.0 - 98.5 %     ID 9,689     DEVICE [id number]     Mode BiLevel     Pressure Support 22 cmH2O    Positive End EXP Press 6 cmH2O    Delivery Systems BiPAP    Comprehensive Metabolic Panel w/ Reflex to MG    Collection Time: 10/13/22  5:46 AM   Result Value Ref Range    Sodium 141 132 - 146 mmol/L    Potassium reflex Magnesium 4.2 3.5 - 5.0 mmol/L    Chloride 99 98 - 107 mmol/L    CO2 32 (H) 22 - 29 mmol/L    Anion Gap 10 7 - 16 mmol/L    Glucose 215 (H) 74 - 99 mg/dL    BUN 19 6 - 23 mg/dL    Creatinine 0.9 0.5 - 1.0 mg/dL    GFR Non-African American >60 >=60 mL/min/1.73    GFR African American >60     Calcium 8.1 (L) 8.6 - 10.2 mg/dL    Total Protein 5.6 (L) 6.4 - 8.3 g/dL    Albumin 3.1 (L) 3.5 - 5.2 g/dL    Total Bilirubin 0.3 0.0 - 1.2 mg/dL    Alkaline Phosphatase 87 35 - 104 U/L    ALT <5 0 - 32 U/L    AST 12 0 - 31 U/L   CBC with Auto Differential    Collection Time: 10/13/22  5:46 AM   Result Value Ref Range    WBC 5.4 4.5 - 11.5 E9/L    RBC 3.77 3.50 - 5.50 E12/L    Hemoglobin 10.0 (L) 11.5 - 15.5 g/dL    Hematocrit 34.3 34.0 - 48.0 %    MCV 91.0 80.0 - 99.9 fL    MCH 26.5 26.0 - 35.0 pg    MCHC 29.2 (L) 32.0 - 34.5 %    RDW 15.8 (H) 11.5 - 15.0 fL    Platelets 547 122 - 001 E9/L    MPV 10.8 7.0 - 12.0 fL    Neutrophils % 78.7 43.0 - 80.0 %    Immature Granulocytes % 2.2 0.0 - 5.0 %    Lymphocytes % 14.8 (L) 20.0 - 42.0 %    Monocytes % 2.8 2.0 - 12.0 %    Eosinophils % 0.9 0.0 - 6.0 %    Basophils % 0.6 0.0 - 2.0 %    Neutrophils Absolute 4.25 1.80 - 7.30 E9/L    Immature Granulocytes # 0.12 E9/L    Lymphocytes Absolute 0.80 (L) 1.50 - 4.00 E9/L    Monocytes Absolute 0.15 0.10 - 0.95 E9/L    Eosinophils Absolute 0.05 0.05 - 0.50 E9/L    Basophils Absolute 0.03 0.00 - 0.20 E9/L   POCT Glucose    Collection Time: 10/13/22  7:57 AM   Result Value Ref Range    Meter Glucose 193 (H) 74 - 99 mg/dL        Imaging/Diagnostics Last 24 Hours   XR CHEST (2 VW)    Result Date: 10/12/2022  EXAMINATION: TWO XRAY VIEWS OF THE CHEST 10/12/2022 5:19 pm COMPARISON: 10/08/2022 HISTORY: ORDERING SYSTEM PROVIDED HISTORY: sob TECHNOLOGIST PROVIDED HISTORY: Reason for exam:->sob FINDINGS: Frontal and lateral views of the chest demonstrate marked cardiomegaly with persistent right-sided pleural effusion with prominent pulmonary vascularity and small left-sided pleural effusion also identified. There is no evidence of a pneumothorax. Cardiomegaly with persistent interstitial and alveolar pulmonary edema which is most pronounced on the right.        Assessment      Hospital Problems             Last Modified POA    * (Principal) COPD exacerbation ([facility] [address] 10/12/2022 Yes   Acute on chronic hypoxic/hypercapnic respiratory failure  Acute on chronic HFpEF/right heart failure with evidence of pulmonary hypertension   Persistent atrial fibrillation with RVR   Plan   Admit to telemetry/imc  Bipap  Oxygen support  IV diuresis  IV solumedrol  Aerosols  Continue anticoagulation  Resume home medications  Pulmonology consult  Cardiology consult    Consultations Ordered:  IP CONSULT TO PULMONOLOGY  IP CONSULT TO CARDIOLOGY    Electronically signed by Hawa Martinez DO on 10/13/22 at 8:26 AM EDT

## 2022-10-13 NOTE — PROGRESS NOTES
Pharmacy Consultation Note  (Antibiotic Dosing and Monitoring)    Initial consult date: 10/13/2022  Consulting physician/provider: Dr. Nata Barajas  Drug: Vancomycin  Indication: \"respiratory\"    Age/  Gender Height Weight IBW  Allergy Information   71 y.o./female 5' (152.4 cm) 211 lb (95.7 kg)     Ideal body weight: 45.5 kg (100 lb 4.9 oz)  Adjusted ideal body weight: 65.6 kg (144 lb 9.4 oz)   Ciprofloxacin and Codeine      Renal Function:  Recent Labs     10/12/22  1659 10/13/22  0546   BUN 19 19   CREATININE 0.9 0.9       Intake/Output Summary (Last 24 hours) at 10/13/2022 1304  Last data filed at 10/13/2022 0434  Gross per 24 hour   Intake 250 ml   Output --   Net 250 ml       Vancomycin Monitoring:  Trough:  No results for input(s): VANCOTROUGH in the last 72 hours. Random:  No results for input(s): VANCORANDOM in the last 72 hours. No results for input(s): Lincoln Adria in the last 72 hours. Historical Cultures:  Organism   Date Value Ref Range Status   10/05/2022 Escherichia coli (A)  Final     No results for input(s): BC in the last 72 hours. Vancomycin Administration Times:  Recent vancomycin administrations        No vancomycin IV orders with administrations found. Orders not given:            vancomycin (VANCOCIN) 1,500 mg in dextrose 5 % 300 mL IVPB                    Assessment:  Patient is a 66 y.o. female who has been initiated on vancomycin  Estimated Creatinine Clearance: 59 mL/min (based on SCr of 0.9 mg/dL).   To dose vancomycin, pharmacy will be utilizing [employer] calculation software for goal AUC/DARELL 400-600 mg/L-hr    Plan:  Vancomycin 1250 mg IV q24h (est AUC/DARELL 531 mg/L.hr, est Trough 14.6 mcg/mL)  Will check vancomycin levels when appropriate  Will continue to monitor renal function   Pharmacy to follow    Sommer Escalante PharmD, BCPS 10/13/2022 1:07 PM   Ext: [id number]

## 2022-10-13 NOTE — PROGRESS NOTES
Verbal bipap order given by Dr Dutton Check. RT tried to place patient on, but patient is refusing to go on until she uses the restroom and gets an Ativan. Bipap is on standby with settings in. RN aware.

## 2022-10-14 ENCOUNTER — APPOINTMENT (OUTPATIENT)
Dept: GENERAL RADIOLOGY | Age: 72
DRG: 291 | End: 2022-10-14
Payer: MEDICARE

## 2022-10-14 LAB
ALBUMIN SERPL-MCNC: 3.5 G/DL (ref 3.5–5.2)
ALP BLD-CCNC: 98 U/L (ref 35–104)
ALT SERPL-CCNC: <5 U/L (ref 0–32)
ANION GAP SERPL CALCULATED.3IONS-SCNC: 8 MMOL/L (ref 7–16)
AST SERPL-CCNC: 7 U/L (ref 0–31)
BASOPHILIC STIPPLING: ABNORMAL
BASOPHILS ABSOLUTE: 0 E9/L (ref 0–0.2)
BASOPHILS RELATIVE PERCENT: 0.3 % (ref 0–2)
BILIRUB SERPL-MCNC: 0.3 MG/DL (ref 0–1.2)
BUN BLDV-MCNC: 27 MG/DL (ref 6–23)
CALCIUM SERPL-MCNC: 8.8 MG/DL (ref 8.6–10.2)
CHLORIDE BLD-SCNC: 93 MMOL/L (ref 98–107)
CO2: 38 MMOL/L (ref 22–29)
CREAT SERPL-MCNC: 1.1 MG/DL (ref 0.5–1)
EKG ATRIAL RATE: 65 BPM
EKG Q-T INTERVAL: 374 MS
EKG QRS DURATION: 70 MS
EKG QTC CALCULATION (BAZETT): 415 MS
EKG R AXIS: 71 DEGREES
EKG T AXIS: 25 DEGREES
EKG VENTRICULAR RATE: 74 BPM
EOSINOPHILS ABSOLUTE: 0 E9/L (ref 0.05–0.5)
EOSINOPHILS RELATIVE PERCENT: 0 % (ref 0–6)
GFR AFRICAN AMERICAN: 59
GFR NON-AFRICAN AMERICAN: 49 ML/MIN/1.73
GLUCOSE BLD-MCNC: 334 MG/DL (ref 74–99)
HCT VFR BLD CALC: 34.8 % (ref 34–48)
HEMOGLOBIN: 10 G/DL (ref 11.5–15.5)
HYPOCHROMIA: ABNORMAL
L. PNEUMOPHILA SEROGP 1 UR AG: NORMAL
LYMPHOCYTES ABSOLUTE: 0.52 E9/L (ref 1.5–4)
LYMPHOCYTES RELATIVE PERCENT: 7 % (ref 20–42)
MAGNESIUM: 2.1 MG/DL (ref 1.6–2.6)
MCH RBC QN AUTO: 26.9 PG (ref 26–35)
MCHC RBC AUTO-ENTMCNC: 28.7 % (ref 32–34.5)
MCV RBC AUTO: 93.5 FL (ref 80–99.9)
METAMYELOCYTES RELATIVE PERCENT: 0.9 % (ref 0–1)
METER GLUCOSE: 193 MG/DL (ref 74–99)
METER GLUCOSE: 264 MG/DL (ref 74–99)
METER GLUCOSE: 301 MG/DL (ref 74–99)
METER GLUCOSE: 407 MG/DL (ref 74–99)
MONOCYTES ABSOLUTE: 0.07 E9/L (ref 0.1–0.95)
MONOCYTES RELATIVE PERCENT: 0.9 % (ref 2–12)
MYELOCYTE PERCENT: 2.6 % (ref 0–0)
NEUTROPHILS ABSOLUTE: 6.81 E9/L (ref 1.8–7.3)
NEUTROPHILS RELATIVE PERCENT: 88.7 % (ref 43–80)
OVALOCYTES: ABNORMAL
PDW BLD-RTO: 15.6 FL (ref 11.5–15)
PLATELET # BLD: 269 E9/L (ref 130–450)
PMV BLD AUTO: 10.6 FL (ref 7–12)
POIKILOCYTES: ABNORMAL
POLYCHROMASIA: ABNORMAL
POTASSIUM SERPL-SCNC: 4.8 MMOL/L (ref 3.5–5)
RBC # BLD: 3.72 E12/L (ref 3.5–5.5)
SODIUM BLD-SCNC: 139 MMOL/L (ref 132–146)
STREP PNEUMONIAE ANTIGEN, URINE: NORMAL
TEAR DROP CELLS: ABNORMAL
TOTAL PROTEIN: 6.3 G/DL (ref 6.4–8.3)
VANCOMYCIN RANDOM: <4 MCG/ML (ref 5–40)
WBC # BLD: 7.4 E9/L (ref 4.5–11.5)

## 2022-10-14 PROCEDURE — 94660 CPAP INITIATION&MGMT: CPT

## 2022-10-14 PROCEDURE — 71045 X-RAY EXAM CHEST 1 VIEW: CPT

## 2022-10-14 PROCEDURE — 36415 COLL VENOUS BLD VENIPUNCTURE: CPT

## 2022-10-14 PROCEDURE — 6360000002 HC RX W HCPCS: Performed by: INTERNAL MEDICINE

## 2022-10-14 PROCEDURE — 83735 ASSAY OF MAGNESIUM: CPT

## 2022-10-14 PROCEDURE — 6360000002 HC RX W HCPCS: Performed by: FAMILY MEDICINE

## 2022-10-14 PROCEDURE — 97165 OT EVAL LOW COMPLEX 30 MIN: CPT

## 2022-10-14 PROCEDURE — 92610 EVALUATE SWALLOWING FUNCTION: CPT | Performed by: SPEECH-LANGUAGE PATHOLOGIST

## 2022-10-14 PROCEDURE — 6370000000 HC RX 637 (ALT 250 FOR IP): Performed by: FAMILY MEDICINE

## 2022-10-14 PROCEDURE — 2500000003 HC RX 250 WO HCPCS: Performed by: FAMILY MEDICINE

## 2022-10-14 PROCEDURE — 85025 COMPLETE CBC W/AUTO DIFF WBC: CPT

## 2022-10-14 PROCEDURE — 94640 AIRWAY INHALATION TREATMENT: CPT

## 2022-10-14 PROCEDURE — 80202 ASSAY OF VANCOMYCIN: CPT

## 2022-10-14 PROCEDURE — 97530 THERAPEUTIC ACTIVITIES: CPT

## 2022-10-14 PROCEDURE — 6370000000 HC RX 637 (ALT 250 FOR IP): Performed by: CLINICAL NURSE SPECIALIST

## 2022-10-14 PROCEDURE — 2060000000 HC ICU INTERMEDIATE R&B

## 2022-10-14 PROCEDURE — 82962 GLUCOSE BLOOD TEST: CPT

## 2022-10-14 PROCEDURE — 80053 COMPREHEN METABOLIC PANEL: CPT

## 2022-10-14 PROCEDURE — 97535 SELF CARE MNGMENT TRAINING: CPT

## 2022-10-14 PROCEDURE — 2700000000 HC OXYGEN THERAPY PER DAY

## 2022-10-14 PROCEDURE — 2580000003 HC RX 258: Performed by: FAMILY MEDICINE

## 2022-10-14 PROCEDURE — 2580000003 HC RX 258: Performed by: INTERNAL MEDICINE

## 2022-10-14 PROCEDURE — 6370000000 HC RX 637 (ALT 250 FOR IP): Performed by: NURSE PRACTITIONER

## 2022-10-14 PROCEDURE — 99233 SBSQ HOSP IP/OBS HIGH 50: CPT | Performed by: INTERNAL MEDICINE

## 2022-10-14 RX ORDER — PREDNISONE 20 MG/1
20 TABLET ORAL DAILY
Status: DISCONTINUED | OUTPATIENT
Start: 2022-10-16 | End: 2022-10-16

## 2022-10-14 RX ORDER — FUROSEMIDE 40 MG/1
40 TABLET ORAL DAILY
Status: DISCONTINUED | OUTPATIENT
Start: 2022-10-15 | End: 2022-10-22 | Stop reason: HOSPADM

## 2022-10-14 RX ORDER — METHYLPREDNISOLONE SODIUM SUCCINATE 40 MG/ML
20 INJECTION, POWDER, LYOPHILIZED, FOR SOLUTION INTRAMUSCULAR; INTRAVENOUS ONCE
Status: COMPLETED | OUTPATIENT
Start: 2022-10-14 | End: 2022-10-14

## 2022-10-14 RX ORDER — METHYLPREDNISOLONE SODIUM SUCCINATE 40 MG/ML
20 INJECTION, POWDER, LYOPHILIZED, FOR SOLUTION INTRAMUSCULAR; INTRAVENOUS EVERY 12 HOURS
Status: COMPLETED | OUTPATIENT
Start: 2022-10-15 | End: 2022-10-15

## 2022-10-14 RX ORDER — DEXTROSE MONOHYDRATE 100 MG/ML
INJECTION, SOLUTION INTRAVENOUS CONTINUOUS PRN
Status: DISCONTINUED | OUTPATIENT
Start: 2022-10-14 | End: 2022-10-22 | Stop reason: HOSPADM

## 2022-10-14 RX ADMIN — INSULIN LISPRO 8 UNITS: 100 INJECTION, SOLUTION INTRAVENOUS; SUBCUTANEOUS at 17:18

## 2022-10-14 RX ADMIN — ATORVASTATIN CALCIUM 40 MG: 40 TABLET, FILM COATED ORAL at 21:22

## 2022-10-14 RX ADMIN — ROPINIROLE HYDROCHLORIDE 1 MG: 1 TABLET, FILM COATED ORAL at 09:12

## 2022-10-14 RX ADMIN — ROPINIROLE HYDROCHLORIDE 1 MG: 1 TABLET, FILM COATED ORAL at 21:22

## 2022-10-14 RX ADMIN — CARBIDOPA AND LEVODOPA 2 TABLET: 25; 100 TABLET, EXTENDED RELEASE ORAL at 08:15

## 2022-10-14 RX ADMIN — CARBIDOPA AND LEVODOPA 2 TABLET: 25; 100 TABLET, EXTENDED RELEASE ORAL at 21:47

## 2022-10-14 RX ADMIN — VANCOMYCIN HYDROCHLORIDE 1250 MG: 10 INJECTION, POWDER, LYOPHILIZED, FOR SOLUTION INTRAVENOUS at 14:53

## 2022-10-14 RX ADMIN — LORAZEPAM 0.5 MG: 0.5 TABLET ORAL at 00:46

## 2022-10-14 RX ADMIN — Medication 10 ML: at 21:23

## 2022-10-14 RX ADMIN — INSULIN GLARGINE 13 UNITS: 100 INJECTION, SOLUTION SUBCUTANEOUS at 21:24

## 2022-10-14 RX ADMIN — ACETAMINOPHEN 650 MG: 325 TABLET ORAL at 03:52

## 2022-10-14 RX ADMIN — APIXABAN 5 MG: 5 TABLET, FILM COATED ORAL at 09:11

## 2022-10-14 RX ADMIN — APIXABAN 5 MG: 5 TABLET, FILM COATED ORAL at 21:22

## 2022-10-14 RX ADMIN — PANTOPRAZOLE SODIUM 40 MG: 40 TABLET, DELAYED RELEASE ORAL at 09:12

## 2022-10-14 RX ADMIN — IPRATROPIUM BROMIDE AND ALBUTEROL SULFATE 1 AMPULE: .5; 2.5 SOLUTION RESPIRATORY (INHALATION) at 05:42

## 2022-10-14 RX ADMIN — CARBIDOPA AND LEVODOPA 2 TABLET: 25; 100 TABLET, EXTENDED RELEASE ORAL at 14:00

## 2022-10-14 RX ADMIN — INSULIN GLARGINE 13 UNITS: 100 INJECTION, SOLUTION SUBCUTANEOUS at 09:29

## 2022-10-14 RX ADMIN — METOPROLOL SUCCINATE 100 MG: 50 TABLET, EXTENDED RELEASE ORAL at 21:23

## 2022-10-14 RX ADMIN — PIPERACILLIN AND TAZOBACTAM 3375 MG: 3; .375 INJECTION, POWDER, FOR SOLUTION INTRAVENOUS at 09:17

## 2022-10-14 RX ADMIN — PIPERACILLIN AND TAZOBACTAM 3375 MG: 3; .375 INJECTION, POWDER, FOR SOLUTION INTRAVENOUS at 16:46

## 2022-10-14 RX ADMIN — IPRATROPIUM BROMIDE AND ALBUTEROL SULFATE 1 AMPULE: .5; 2.5 SOLUTION RESPIRATORY (INHALATION) at 13:49

## 2022-10-14 RX ADMIN — MONTELUKAST 10 MG: 10 TABLET, FILM COATED ORAL at 21:22

## 2022-10-14 RX ADMIN — INSULIN LISPRO 16 UNITS: 100 INJECTION, SOLUTION INTRAVENOUS; SUBCUTANEOUS at 12:12

## 2022-10-14 RX ADMIN — METHYLPREDNISOLONE SODIUM SUCCINATE 20 MG: 40 INJECTION, POWDER, FOR SOLUTION INTRAMUSCULAR; INTRAVENOUS at 17:05

## 2022-10-14 RX ADMIN — METOPROLOL SUCCINATE 100 MG: 50 TABLET, EXTENDED RELEASE ORAL at 09:12

## 2022-10-14 RX ADMIN — IPRATROPIUM BROMIDE AND ALBUTEROL SULFATE 1 AMPULE: .5; 2.5 SOLUTION RESPIRATORY (INHALATION) at 02:26

## 2022-10-14 RX ADMIN — ARFORMOTEROL TARTRATE 15 MCG: 15 SOLUTION RESPIRATORY (INHALATION) at 05:42

## 2022-10-14 RX ADMIN — ROPINIROLE HYDROCHLORIDE 1 MG: 1 TABLET, FILM COATED ORAL at 14:00

## 2022-10-14 RX ADMIN — MIRTAZAPINE 15 MG: 15 TABLET, ORALLY DISINTEGRATING ORAL at 21:22

## 2022-10-14 RX ADMIN — ARFORMOTEROL TARTRATE 15 MCG: 15 SOLUTION RESPIRATORY (INHALATION) at 18:29

## 2022-10-14 RX ADMIN — BUMETANIDE 1 MG: 0.25 INJECTION INTRAMUSCULAR; INTRAVENOUS at 09:11

## 2022-10-14 RX ADMIN — METHYLPREDNISOLONE SODIUM SUCCINATE 40 MG: 40 INJECTION, POWDER, FOR SOLUTION INTRAMUSCULAR; INTRAVENOUS at 03:52

## 2022-10-14 RX ADMIN — IPRATROPIUM BROMIDE AND ALBUTEROL SULFATE 1 AMPULE: .5; 2.5 SOLUTION RESPIRATORY (INHALATION) at 18:29

## 2022-10-14 RX ADMIN — INSULIN LISPRO 12 UNITS: 100 INJECTION, SOLUTION INTRAVENOUS; SUBCUTANEOUS at 08:12

## 2022-10-14 RX ADMIN — SUCRALFATE 1 G: 1 TABLET ORAL at 21:22

## 2022-10-14 ASSESSMENT — PAIN SCALES - GENERAL
PAINLEVEL_OUTOF10: 0
PAINLEVEL_OUTOF10: 5

## 2022-10-14 ASSESSMENT — PAIN - FUNCTIONAL ASSESSMENT: PAIN_FUNCTIONAL_ASSESSMENT: ACTIVITIES ARE NOT PREVENTED

## 2022-10-14 ASSESSMENT — PAIN DESCRIPTION - LOCATION: LOCATION: BACK

## 2022-10-14 NOTE — PROGRESS NOTES
Physical Therapy Treatment Note/Plan of Care    Room #:  7155/5381-92  Patient Name: Alvarez Galvez  YOB: 1950  MRN: [id number]    Date of Service: 10/14/2022     Tentative placement recommendation: [facility] with Physical Therapy   Equipment recommendation: Equipment at Nursing Home      Evaluating Physical Therapist: Marine Alcocer, [address]      Specific Provider Orders/Date/Referring Provider :  10/12/22 2130    PT evaluation and treat  Start:  10/12/22 2130,   End:  10/12/22 2130,   ONE TIME,   Standing Count:  1 Occurrences,   R         Salbador Hashimoto, DO     Admitting Diagnosis:   COPD exacerbation ([address] [J44.1]  Acute on chronic respiratory failure with hypercapnia ([address] [J96.22]  Acute on chronic congestive heart failure, unspecified heart failure type ([address] [I50.9]     shortness of breath  Surgery: none  Visit Diagnoses         Codes    Acute on chronic respiratory failure with hypercapnia ([address]    -  Primary J96.22            Patient Active Problem List   Diagnosis    Depression with anxiety    Osteoporosis    Asthma    Hyperlipidemia    Mitral regurgitation    Obstructive sleep apnea syndrome    Psoriasis    Diabetes mellitus ([address]    Parkinson's disease ([address]    Primary hypertension    Microalbuminuria    Morbid obesity ([address]    RLS (restless legs syndrome)    Generalized weakness    Inability to walk    Hypothyroidism    Chest pain    Acute asthma exacerbation    Asthma exacerbation, mild    Paroxysmal atrial fibrillation (HCC)    Atrial fibrillation with RVR ([address]    Acute on chronic congestive heart failure ([address]    Coronary artery disease involving native coronary artery of native heart without angina pectoris    Dysphagia    Hepatosplenomegaly    Acute decompensated heart failure (HCC)    Acute diastolic (congestive) heart failure (HCC)    Nonrheumatic tricuspid valve regurgitation    Tobacco abuse    Recurrent syncope    Septic shock ([address]    Seizure-like activity ([facility]    SHANTANU (acute kidney injury) ([facility]    Pancytopenia (HCC)    Thrombocytopenia (HCC)    Encephalopathy    Acute respiratory failure with hypoxia (HCC)    Lactic acidosis    Delirium    Acute on chronic anemia    Pleural effusion, right    Acute respiratory failure with hypoxia and hypercapnia (HCC)    Acute confusion    Chronic diastolic (congestive) heart failure (HCC)    Macrocytosis    Other specified anemias    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[facility]    Puerperal sepsis with acute hypoxic respiratory failure without septic shock (HCC)    Pulmonary HTN (HCC)    Chronic anticoagulation    RVF (right ventricular failure) (HCC)    Metabolic alkalosis    Sepsis (HCC)    COPD exacerbation (HCC)    Acute on chronic respiratory failure with hypercapnia (HCC)    Persistent atrial fibrillation (HCC)        ASSESSMENT of Current Deficits Patient exhibits decreased strength, balance, and endurance impairing functional mobility, transfers, gait , gait distance, and tolerance to activity Patient needing Min assist for standing activities for stability. Patient tolerates multiple transfers. Patient limited in further function due to weakness, fatigue and patient just receiving dieretic. Patient requires continued skilled physical therapy to address concerns listed above for increased safety and function at discharge.          PHYSICAL THERAPY  PLAN OF CARE       Physical therapy plan of care is established based on physician order,  patient diagnosis and clinical assessment    Current Treatment Recommendations:    -Bed Mobility: Lower extremity exercises   -Sitting Balance: Incorporate reaching activities to activate trunk muscles   -Standing Balance: Perform strengthening exercises in standing to promote motor control with or without upper extremity support   -Transfers: Provide instruction on proper hand and foot position for adequate transfer of weight onto lower extremities and use of gait device if needed and Cues for hand placement, technique and safety. Provide stabilization to prevent fall   -Gait: Gait training, Standing activities to improve: base of support, weight shift, weight bearing , and Pregait training to emphasize: Sequencing , Device control, Upright, and Safety   -Endurance: Utilize Supervised activities to increase level of endurance to allow for safe functional mobility including transfers and gait     PT long term treatment goals are located in below grid    Patient and or family understand(s) diagnosis, prognosis, and plan of care. Frequency of treatments: Patient will be seen  daily.          Prior Level of Function: Patient ambulated with wheeled walker   short distance, transfers to wheelchair  Rehab Potential: good   for baseline    Past medical history:   Past Medical History:   Diagnosis Date    A-fib ([facility]     Acute on chronic congestive heart failure (HCC)     Anxiety     Asthma     CAD (coronary artery disease) 01/21/2016    Cancer ([facility]  breast ca 2006    right lumpectomy    Chronic kidney disease     nephrolithiasis    COPD exacerbation ([address] 10/12/2022    Depression     Diabetes mellitus ([address]     H/O mammogram     Hx MRSA infection     toe infection january 2012    Hyperlipidemia     Hypertension     Lateral epicondylitis     Morbid obesity ([address]     SERENA on CPAP     Oxygen dependent     Parkinson's disease ([address]     Tubal ligation status      Past Surgical History:   Procedure Laterality Date    BREAST LUMPECTOMY      BREAST REDUCTION SURGERY      CARDIAC CATHETERIZATION  4/28/2014    Dr. Lazarus Belt  01/11/2022    Dr. Liu Moe  7/29/15    CT GUIDED CHEST TUBE  7/8/2022    CT GUIDED CHEST TUBE 7/8/2022 Andreia Cheung MD [facility]    ENDOSCOPY, COLON, DIAGNOSTIC  7/19/15    GALLBLADDER SURGERY      LUMBAR LAMINECTOMY      TOE AMPUTATION      TONSILLECTOMY      UPPER GASTROINTESTINAL ENDOSCOPY      UPPER GASTROINTESTINAL ENDOSCOPY N/A 7/19/2022    EGD ESOPHAGOGASTRODUODENOSCOPY performed by Lauren Velasquez MD at [address]:    Precautions: Up as tolerated, falls ,   O2, Parkinson's disease    Imaging results: XR CHEST (2 VW)    Result Date: 10/12/2022  EXAMINATION: TWO XRAY VIEWS OF THE CHEST 10/12/2022 5:19 pm      Cardiomegaly with persistent interstitial and alveolar pulmonary edema which is most pronounced on the right. CT HEAD WO CONTRAST    Result Date: 10/9/2022  EXAMINATION: CT OF THE HEAD WITHOUT CONTRAST  10/9/2022 12:47 pm      No acute intracranial abnormality. CT ABDOMEN PELVIS W IV CONTRAST Additional Contrast? None    Result Date: 10/6/2022  EXAMINATION: CT OF THE ABDOMEN AND PELVIS WITH CONTRAST 10/6/2022 11:33 am    Subtle suggestion of hepatic steatosis and cirrhosis including portal venous hypertension with splenomegaly. Small volume abdominal ascites and mild anasarca. Bilateral lower lobe infiltrates and pleural effusions, right more than left. Pneumonia is suspected. Stable benign left adrenal adenoma. No imaging follow-up is required. Chronic left renal scarring probably due to remote infection. Small collection of gas within the endometrial cavity presumably iatrogenically introduced. No definite uterine pathology is appreciated. No focal uterine fluid collections. The uterus could be further evaluated sonographically if clinically indicated. Stool-filled colon particularly on the right suggesting constipation. XR CHEST PORTABLE    Result Date: 10/8/2022  EXAMINATION: ONE XRAY VIEW OF THE CHEST 10/8/2022 7:04 am      Redemonstration of interstitial pulmonary edema or pneumonia bilaterally appearing to have increased in right lung base.     Annelle Factor is most likely a confluence of pulmonary vessels. US DUP UPPER EXTREMITY LEFT VENOUS    Result Date: 10/8/2022  EXAMINATION: VENOUS ULTRASOUND OF THE LEFT UPPER EXTREMITY, 10/8/2022 6:54 pm      No evidence of DVT.  RECOMMENDATIONS: Unavailable 44e  Social history: Patient lives in a skilled nursing facility [facility] owned: Xuan Gillisry, [address], and [employer],       [address] [facility]   How much difficulty turning over in bed?: A Little  How much difficulty sitting down on / standing up from a chair with arms?: A Little  How much difficulty moving from lying on back to sitting on side of bed?: A Little  How much help from another person moving to and from a bed to a chair?: A Little  How much help from another person needed to walk in hospital room?: Total  How much help from another person for climbing 3-5 steps with a railing?: Total  AM-PAC Inpatient Mobility Raw Score : 14  AM-PAC Inpatient T-Scale Score : 38.1  Mobility Inpatient CMS 0-100% Score: 61.29  Mobility Inpatient CMS G-Code Modifier : CL    Nursing cleared patient for PT treatment. Patient sitting up in bedside chair at start of session. OBJECTIVE;   Initial Evaluation  Date: 10/13/2022 Treatment Date:    10/14/2022   Short Term/ Long Term   Goals   Was pt agreeable to Eval/treatment? Yes yes To be met in 3 days   Pain level   0/10    0/10    Bed Mobility    Rolling: Not assessed     Supine to sit: Minimal assist of 1    Sit to supine: Not assessed     Scooting: Minimal assist of 1   Rolling: Not assessed patient in chair   Supine to sit: Not assessed patient in chair   Sit to supine: Minimal assist of 1   Scooting: Supervision     Rolling: Independent    Supine to sit:  Independent    Sit to supine: Independent    Scooting: Independent     Transfers Sit to stand: Minimal assist of 1 Cues for hand placement and safety  Sit to stand: Minimal assist of 1 cues for safety and hand placement x multiple reps   Sit to stand: Supervision      Ambulation     5 feet using  wheeled walker with Minimal assist of 1   for walker control and cues for sequencing, upright posture, and safety 3x2-3 steps using  wheeled walker with Minimal assist of 1   cues for upright posture, safety, and pacing     20 feet using  wheeled walker with Supervision     Stair negotiation: ascended and descended   Not assessed          ROM Within functional limits     Increase range of motion 10% of affected joints    Strength BUE:  refer to OT eval  RLE:  3+ 4-/5  LLE:  3+ 4-/5  Increase strength in affected mm groups by 1/3 grade   Balance Sitting EOB:  good -  Dynamic Standing:  fair wheeled walker  Sitting EOB: good   Dynamic Standing: fair with wheeled walker   Sitting EOB:  good    Dynamic Standing: good -wheeled walker      Patient is Alert & Oriented x person, place, time, and situation and follows directions    Sensation:  Patient  reports numbness/tingling bilateral toes     Edema:  yes bilateral lower extremities   Endurance: fair       Vitals:  4 liters high flow nasal cannula   Blood Pressure at rest  Blood Pressure during session    Heart Rate at rest  Heart Rate during session    SPO2 at rest %  SPO2 during session %     Patient education  Patient educated on role of Physical Therapy, risks of immobility, safety and plan of care, energy conservation,  importance of mobility while in hospital , ankle pumps, quad set and glut set for edema control, blood clot prevention, and safety      Patient response to education:   Pt verbalized understanding Pt demonstrated skill Pt requires further education in this area   Yes Partial Yes      Treatment:  Patient practiced and was instructed/facilitated in the following treatment: Patient assisted to standing and assisted with donash depends. Patient stepped over to bed and sat edge of bed x5min. Patient stood to take steps to bedside commode, assist on/off and with hygiene. Patient assisted back to bedside and supine position. Patient performed supine exercises. Therapeutic Exercises:  ankle pumps, quad sets, and glut sets  x 15-20 reps.        At end of session, patient in bed with     call light and phone within reach,  all lines and tubes intact, nursing notified. Patient would benefit from continued skilled Physical Therapy to improve functional independence and quality of life.          Patient's/ family goals   Return to [facility]    Time in  1008  Time out  1037    Total Treatment Time  29 minutes      CPT codes:  Therapeutic activities (04123)   29 minutes  2 unit(s)    Blanca Monroe, MARIUM   #[id number]

## 2022-10-14 NOTE — PROGRESS NOTES
[address] [facility]  [facility] [place]. [place]        Date:10/14/2022                                                  Patient Name: Valente Lee    MRN: [id number]    : 1950    Room: [address] [place] [address]      Evaluating OT: Nuris Olivera OTR/L #[id number]     Referring Provider and Specific Provider Orders/Date:      10/12/22 2130  OT eval and treat  Start:  10/12/22 2130,   End:  10/12/22 2130,   ONE TIME,   Standing Count:  1 Occurrences,   R         José Miguel Rios,       Placement Recommendation: Subacute Rehab        Diagnosis:   1. Acute on chronic respiratory failure with hypercapnia (HCC)    2. COPD exacerbation ([facility]    3.  Acute on chronic congestive heart failure, unspecified heart failure type [facility])         Surgery: None      Pertinent Medical History:       Past Medical History:   Diagnosis Date    A-fib ([facility]     Acute on chronic congestive heart failure (HCC)     Anxiety     Asthma     CAD (coronary artery disease) 2016    Cancer ([address]  breast ca 2006    right lumpectomy    Chronic kidney disease     nephrolithiasis    COPD exacerbation ([address] 10/12/2022    Depression     Diabetes mellitus ([address]     H/O mammogram     Hx MRSA infection     toe infection 2012    Hyperlipidemia     Hypertension     Lateral epicondylitis     Morbid obesity (HCC)     SERENA on CPAP     Oxygen dependent     Parkinson's disease ([address]     Tubal ligation status          Past Surgical History:   Procedure Laterality Date    BREAST LUMPECTOMY      BREAST REDUCTION SURGERY      CARDIAC CATHETERIZATION  2014    Dr. Mitch Tran  2022    Dr. Genoveva Fall  7/29/15    CT GUIDED CHEST TUBE  2022    CT GUIDED CHEST TUBE 2022 Laurie Licona MD [facility]    ENDOSCOPY, COLON, DIAGNOSTIC  7/19/15    GALLBLADDER SURGERY      LUMBAR LAMINECTOMY      TOE AMPUTATION TONSILLECTOMY      UPPER GASTROINTESTINAL ENDOSCOPY      UPPER GASTROINTESTINAL ENDOSCOPY N/A 7/19/2022    EGD ESOPHAGOGASTRODUODENOSCOPY performed by Rita Jordan MD at [facility] ENDOSCOPY      Precautions:  Up as tolerated, falls , O2, Parkinson's disease     Assessment of current deficits    [x] Functional mobility  [x]ADLs  [x] Strength               []Cognition    [x] Functional transfers   [x] IADLs         [x] Safety Awareness   [x]Endurance    [] Fine Coordination              [x] Balance      [] Vision/perception   []Sensation     []Gross Motor Coordination  [] ROM  [] Delirium                   [] Motor Control     OT PLAN OF CARE   OT POC based on physician orders, patient diagnosis and results of clinical assessment    Frequency/Duration 1-3 days/wk for 2 weeks PRN     Specific OT Treatment Interventions to include:   * Instruction/training on adapted ADL techniques and AE recommendations to increase functional independence within precautions       * Training on energy conservation strategies, correct breathing pattern and techniques to improve independence/tolerance for self-care routine  * Functional transfer/mobility training/DME recommendations for increased independence, safety, and fall prevention  * Patient/Family education to increase follow through with safety techniques and functional independence  * Recommendation of environmental modifications for increased safety with functional transfers/mobility and ADLs  * Therapeutic exercise to improve motor endurance, ROM, and functional strength for ADLs/functional transfers  * Therapeutic activities to facilitate/challenge dynamic balance, stand tolerance for increased safety and independence with ADLs  * Therapeutic activities to facilitate gross/fine motor skills for increased independence with ADLs  * Positioning to improve skin integrity, interaction with environment and functional independence    Recommended Adaptive Equipment: TBD      Home Living: [facility] a [facility] owned: Mckeonbhaskar Mcdonnell, Jimbonoelle Alexx, and Ray Sigala     Prior Level of Function: requires assist with ADLs ; ambulated with wheeled walker short distance, transfers to wheelchair    Enjoys: games on phone      Pain Level: pt denied pain; Nursing notified. Cognition: A&O: 4/4; Follows 3 step directions   Memory: good    Sequencing: good    Problem solving: good    Judgement/safety: good     [facility]   AM-PAC Daily Activity Inpatient   How much help for putting on and taking off regular lower body clothing?: Total  How much help for Bathing?: A Lot  How much help for Toileting?: Total  How much help for putting on and taking off regular upper body clothing?: A Lot  How much help for taking care of personal grooming?: A Little  How much help for eating meals?: A Little  AM-PAC Inpatient Daily Activity Raw Score: 12  AM-PAC Inpatient ADL T-Scale Score : 30.6  ADL Inpatient CMS 0-100% Score: 66.57  ADL Inpatient CMS G-Code Modifier : CL     Functional Assessment:    Initial Eval Status  Date: 10/14/22   Treatment Status  Date: STGs = LTGs  Time frame: 10-14 days   Feeding Supervision    Independent    Grooming Minimal Assist    Supervision    UB Dressing Moderate Assist    Supervision    LB Dressing Dependent    Minimal Assist    Bathing Moderate Assist     Minimal Assist    Toileting Dependent   For rear hygiene while standing at walker; pt has genao catheter. Pt would benefit from bedside commode. Nursing informed. Minimal Assist    Bed Mobility  Supine to sit: Minimal Assist   Sit to supine:  Not Assessed     Supine to sit: Independent   Sit to supine: Independent    Functional Transfers Sit to stand: Minimal Assist   Stand to sit: Minimal Assist     Transfer training with verbal cues for hand placement throughout session to improve safety.      Independent    Functional Mobility Minimal Assist with wheeled walker to improve balance from EOB to chair, verbal cues for walker sequence and safety. Moderate [place] with use of wheeled walker    Balance Sitting:     Static: fair plus    Dynamic: fair unsupported   Standing: fair/fair minus with walker     Sitting:     Static: good    Dynamic: good  Standing: fair plus with walker    Activity Tolerance fair    Increase standing tolerance >3 minutes for improved engagement with functional transfers and indep in ADLs     Visual/  Perceptual Glasses: yes       NA      Hand Dominance: right      AROM (PROM) Strength Additional Info:  Goal:   RUE  WFL 3+/5 good  and wfl FMC/dexterity noted during ADL tasks   Improve overall RUE strength  for participation in functional tasks   LUE WFL 3+/5 good  and wfl FMC/dexterity noted during ADL tasks   Improve overall LUE strength  for participation in functional tasks     Hearing:  [employer]   Sensation:   No c/o numbness or tingling  Tone:  WFL   Edema:    Comments: RN cleared patient for OT. Upon arrival patient in supine. Therapist facilitated and instructed pt on adapted  techniques & compensatory strategies to improve safety and independence with basic ADLs, bed mobility, functional transfers and mobility to allow pt to achieve highest level of independence and safely. Pt demonstrated fair plus understanding of education & follow through. At end of session, patient was in chair with call light and phone within reach, all lines and tubes intact. Overall, patient demonstrated  decreased independence and safety during completion of ADL tasks. Pt would benefit from continued skilled OT to increase safety and independence with completion of ADL tasks and functional mobility for improved quality of life. Treatment: OT treatment provided this date includes:   Instructions/training on safety, sequencing, and adapted techniques for completion of ADLs.   Facilitated bed mobility with cues for proper body mechanics and sequencing to prepare for ADL completion. Instruction/training on safe functional mobility/transfer techniques including hand and feet placement   Instruction/training on energy conservation/work simplification for completion of ADLs   Skilled monitoring of O2 sats - see section above     Rehab Potential: Good for established goals. Patient / Family Goal: not stated       Patient and/or family were instructed on functional diagnosis, prognosis/goals and OT plan of care. Demonstrated fair understanding. Eval Complexity: Low    Time In: 8:45 AM   Time Out: 9:20 AM    Total Treatment Time: 15       Min Units   OT Eval Low 97165  X  1    OT Eval Medium 14848      OT Eval High 20289      OT Re-Eval 38789            ADL/Self Care 66820 10 1   Therapeutic Activities 24754 5 0   Therapeutic Ex 42227       Orthotic Management 71692       Manual 94590     Neuro Re-Ed 01624       Non-Billable Time        Evaluation Time additionally includes thorough review of current medical information, gathering information on past medical history/social history and prior level of function, interpretation of standardized testing/informal observation of tasks, assessment of data and development of plan of care and goals.         Evaluating OT: Berkley Devlin OTR/L #[id number]

## 2022-10-14 NOTE — CARE COORDINATION
Advance Care Planning   Healthcare Decision Maker:    Primary Decision Maker: Sunita Edgar Child - [phone]    Secondary Decision Maker: Sangita Wong - Child - [phone]    Supplemental (Other) Decision Maker: Patricia miller - Child - [phone]

## 2022-10-14 NOTE — PLAN OF CARE
Problem: Discharge Planning  Goal: Discharge to home or other facility with appropriate resources  Outcome: Progressing  Flowsheets (Taken 10/14/2022 2559)  Discharge to home or other facility with appropriate resources: Identify barriers to discharge with patient and caregiver     Problem: Safety - Adult  Goal: Free from fall injury  Outcome: Progressing     Problem: ABCDS Injury Assessment  Goal: Absence of physical injury  Outcome: Progressing  Flowsheets (Taken 10/14/2022 1347)  Absence of Physical Injury: Implement safety measures based on patient assessment     Problem: Skin/Tissue Integrity  Goal: Absence of new skin breakdown  Description: 1. Monitor for areas of redness and/or skin breakdown  2. Assess vascular access sites hourly  3. Every 4-6 hours minimum:  Change oxygen saturation probe site  4. Every 4-6 hours:  If on nasal continuous positive airway pressure, respiratory therapy assess nares and determine need for appliance change or resting period.   Outcome: Progressing     Problem: Chronic Conditions and Co-morbidities  Goal: Patient's chronic conditions and co-morbidity symptoms are monitored and maintained or improved  Outcome: Progressing  Flowsheets (Taken 10/14/2022 9987)  Care Plan - Patient's Chronic Conditions and Co-Morbidity Symptoms are Monitored and Maintained or Improved: Monitor and assess patient's chronic conditions and comorbid symptoms for stability, deterioration, or improvement

## 2022-10-14 NOTE — PROGRESS NOTES
SPEECH/LANGUAGE PATHOLOGY  CLINICAL ASSESSMENT OF SWALLOWING FUNCTION   and PLAN OF CARE    PATIENT NAME:  Ximena Farrar  (female)     MRN:  [id number]    :  1950  (66 y.o.)  STATUS:  Inpatient: Room 0515/0515-02    TODAY'S DATE:  10/14/2022  REFERRING PROVIDER:   Dr. Patricia Dc: speech language pathology (SLP) eval and treat Date of order:  10/14/2022  REASON FOR REFERRAL: dysphagia   EVALUATING THERAPIST: ONEIL Mustafa                 RESULTS:    DYSPHAGIA DIAGNOSIS:   Clinical indicators of mild-moderate oropharyngeal phase dysphagia       DIET RECOMMENDATIONS:  Minced and moist consistency solids (IDDSI level 5) with  nectar consistency (mildly thick - IDDSI level 2) liquids     FEEDING RECOMMENDATIONS:     Assistance level:  Set-up is required for all oral intake      Compensatory strategies recommended: Small bites/sips and Alternate solids and liquids      Discussed recommendations with nursing and/or faxed report to referring provider: Yes    SPEECH THERAPY  PLAN OF CARE   The dysphagia POC is established based on physician order, dysphagia diagnosis and results of clinical assessment     Skilled SLP intervention for dysphagia management up to 5x per week until goals met, pt plateaus in function and/or discharged from hospital    Conditions Requiring Skilled Therapeutic Intervention for dysphagia:    Coughing during PO intake    History of dysphagia/altered consistency      Specific dysphagia interventions to include:     Training in positioning for improved integrity of swallow  Compensatory strategy training   Therapeutic exercises  Trials of upgraded diet/liquid     Specific instructions for next treatment:  development and training of compensatory swallow strategies to improve airway protection and swallow function  Patient Treatment Goals:    Short Term Goals:  Pt will participate in MBSS to fully assess oropharyngeal swallow function and to assist in determining the least restrictive PO diet to maintain adequate nutrition/hydration   Pt will participate in ongoing mealtime assessment to provide diet modification and compensatory strategy implementation to minimize risk of aspiration associated with PO intake  Pt will complete laryngeal strength/ ROM therapeutic exercises to improve airway protection for the least restrictive PO diet minimal verbal prompts    Long Term Goals:   Pt will improve oropharyngeal swallow function to ensure airway protection during PO intake to maintain adequate nutrition/hydration and decrease signs/symptoms of aspiration to less than 1 x/day. OTHER RECOMMENDATIONS:  A Video Swallow Study (MBSS) is recommended and requires a physician order      Patient/family Goal:    To be able to eat/drink better consistency foods/liquids    Plan of care discussed with Patient  and Family   The Patient and Family understand(s) the diagnosis, prognosis and plan of care     Rehabilitation Potential/Prognosis: good                    ADMITTING DIAGNOSIS: COPD exacerbation ([facility] [address] [J44.1]  Acute on chronic respiratory failure with hypercapnia ([facility]) [J96.22]  Acute on chronic congestive heart failure, unspecified heart failure type ([address] [I50.9]    VISIT DIAGNOSIS:   Visit Diagnoses         Codes    Acute on chronic respiratory failure with hypercapnia ([address]    -  Primary J96.22             PATIENT REPORT/COMPLAINT: initially denies difficulty swallowing recently however is aware that she has been educated regarding her dysphagia and does admit to nectar thick being more appropriate. Discussed solid food consistency and [facility] which is different then our minced and moist and our soft and bite size is large pieces of solids daughter present and concerned about choking potential.  Patient agreeable to try minced and moist here and remain on the nectar thick.    RN cleared patient for participation in assessment     yes     PRIOR LEVEL OF SWALLOW FUNCTION:    PAST HISTORY OF DYSPHAGIA?: yes    Home diet: [facility] soft   nectar consistency (mildly thick - IDDSI level 2) liquids  Current Diet Order:  ADULT DIET; Regular; 3 carb choices (45 gm/meal); Low Sodium (2 gm); Mildly Thick (Nectar)    PROCEDURE:  Consistencies Administered During the Evaluation   Liquids: nectar thick liquid   Solids:  pureed foods      Method of Intake:   Cup spoon  Self fed      Position:   Seated, upright    CLINICAL ASSESSMENT:  Oral Stage:       Pt is able to achieve adequate cohesive bolus prep as evidenced by satisfactory mastication patterns, timely A-P bolus propulsion, and minimal oral residuals. Dentition:  edentulous      Pharyngeal Stage:    No signs of aspiration were noted during this evaluation however, silent aspiration cannot be ruled out at bedside. If silent aspiration is suspected, a Videofluoroscopic Study of Swallowing (MBS) is recommended and requires a physician order. Cognition:   Within functional limits for this exam    Oral Peripheral Examination   Adequate lingual/labial strength     Current Respiratory Status    room air     Parameters of Speech Production  Respiration:  Adequate for speech production  Quality:   Breathy ENT scope done 10/5  Findings: from ENT note   Normal nasopharynx, normal epiglottis, normal tongue base, normal pyriform, normal TVC motion and mucosa, no subglottic masses or lesions. Left cord sluggish with mild arytenoid hooding, cords adduct, no masses or lesions, airway patent  Intensity: Quiet    Volitional Swallow: present     Volitional Cough:   present     Pain: No pain reported. EDUCATION:   The Speech Language Pathologist (SLP) completed education regarding results of evaluation and that intervention is warranted at this time. Learner: Patient  Education: Reviewed results and recommendations of this evaluation  Evaluation of Education:  Che Ing understanding    This plan may be re-evaluated and revised as warranted. Evaluation Time includes thorough review of current medical information, gathering information on past medical history/social history and prior level of function, completion of standardized testing/informal observation of tasks, assessment of data and education on plan of care and goals. [x]The admitting diagnosis and active problem list, have been reviewed prior to initiation of this evaluation.         ACTIVE PROBLEM LIST:   Patient Active Problem List   Diagnosis    Depression with anxiety    Osteoporosis    Asthma    Hyperlipidemia    Mitral regurgitation    Obstructive sleep apnea syndrome    Psoriasis    Diabetes mellitus ([facility] [address]    Parkinson's disease ([facility] [address]    Primary hypertension    Microalbuminuria    Morbid obesity (HCC)    RLS (restless legs syndrome)    Generalized weakness    Inability to walk    Hypothyroidism    Chest pain    Acute asthma exacerbation    Asthma exacerbation, mild    Paroxysmal atrial fibrillation (HCC)    Atrial fibrillation with RVR (HCC)    Acute on chronic congestive heart failure ([facility] [address]    Coronary artery disease involving native coronary artery of native heart without angina pectoris    Dysphagia    Hepatosplenomegaly    Acute decompensated heart failure (HCC)    Acute diastolic (congestive) heart failure (HCC)    Nonrheumatic tricuspid valve regurgitation    Tobacco abuse    Recurrent syncope    Septic shock (HCC)    Seizure-like activity (HCC)    SHANTANU (acute kidney injury) ([facility] [address]    Pancytopenia (HCC)    Thrombocytopenia (HCC)    Encephalopathy    Acute respiratory failure with hypoxia (HCC)    Lactic acidosis    Delirium    Acute on chronic anemia    Pleural effusion, right    Acute respiratory failure with hypoxia and hypercapnia (HCC)    Acute confusion    Chronic diastolic (congestive) heart failure (HCC)    Macrocytosis    Other specified anemias    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[facility] [address]    Puerperal sepsis with acute hypoxic respiratory failure without septic shock ([facility] [address]    Pulmonary HTN ([facility] [address]    Chronic anticoagulation    RVF (right ventricular failure) (HCC)    Metabolic alkalosis    Sepsis ([facility] [address]    COPD exacerbation ([facility] [address]    Acute on chronic respiratory failure with hypercapnia (HCC)    Persistent atrial fibrillation [facility])         CPT code:  63355  bedside swallow paula Mcbride MSCCC/SLP  Speech Language Pathologist  [phone]

## 2022-10-14 NOTE — CONSULTS
[facility] . Patient admitted with COPD exacerbation. HF Navigator was consulted and she was previously given the HF book and HF zones on her last admission. CHF NURSE NAVIGATOR CONSULT NOTE:      Patient currently admitted with diagnosis of Diastolic heart failure. Patient was awake and alert, sitting in chair during the consultation. She was engaged and asked appropriate questions throughout the education session. She is agreeable to continued symptom monitoring, daily weights and low sodium diet. [facility] Cardiology and with the CHF clinic Yes. Patient is currently established with [address] [place] [id number] CHF clinic. Future Appointments   Date Time Provider Prateek Nassar   10/18/2022  2:00 PM [facility] CHF ROOM 1 [facility] [place]   11/16/2022  2:30 PM Javier Resendiz MD [facility]       Barriers to Care:  Contributing risk factors for Heart Failure are identified as overwhelmed by complexity of regimen and multiple co-morbidities. Patient readmitted related to COPD exacerbation. I reviewed the introduction to Heart Failure, the HF zones, signs and symptoms to report on day 1 of onset, medications, medication compliance, the importance of obtaining daily weights, following a low sodium diet, reading food labels for the sodium content, keeping physician appointments, and smoking cessation. The patient is ordered:  Diet: ADULT DIET; Regular; 3 carb choices (45 gm/meal);  Low Sodium (2 gm); Mildly Thick (Nectar)   Sodium controlled diet Yes  Fluid restriction daily ordered (fluid restriction recommended if patient is hyponatremic and/or diuretic is initiated or increased) No  FR:   Daily Weights: Patient Vitals for the past 96 hrs (Last 3 readings):   Weight   10/12/22 1612 211 lb (95.7 kg)     I/O:   Intake/Output Summary (Last 24 hours) at 10/14/2022 1648  Last data filed at 10/14/2022 1113  Gross per 24 hour   Intake --   Output 1150 ml   Net -1150 ml We reviewed the introduction to Heart Failure, the HF zones, signs and symptoms to report on day 1 of onset, medications, medication compliance, the importance of obtaining daily weights, following a low sodium diet, reading food labels for the sodium content, keeping physician appointments, and smoking cessation. We discussed writing / tracking daily weights on a calendar / log because a 5 pound gain in 1 week can sneak up if you are not tracking it. I advised patient they can reduce the risk for Heart Failure exacerbations by modifying / controlling the risk factors. We discussed self-managed care which includes the following:  to take medications as prescribed, report any intolerable side effects of medications to the cardiologist / doctor, do not just stop taking the medication; follow a cardiac heart healthy / low sodium diet; weigh yourself daily, exercise regularly- per doctor recommendation and not to smoke or use an excess amount of alcohol. We discussed calling the cardiologist / doctor with a weight gain of 3 pounds in one day or a total of 5 pounds or more in one week. Also, if you should have a significant weight loss of 3# or more in one day to call the doctor, they may need to decrease or hold the diuretic dose. On days you feels nauseated and not eating / drinking, having emesis or diarrhea,  informed to call the cardiologist  / doctor, they may need to decrease or hold diuretic to avoid dehydration. I stressed the importance of informing their cardiologist the first day of onset of any of the signs and symptoms in the \"Yellow Zone\" of the HF Zones. Patient verbalizes understanding. Greater than 30 minutes was spent educating patient. The Heart Failure Booklet given to the patient with additional patient education addressing:  What is Heart Failure? Things You Can Do to Live Well with HF  How to Take Your Medications  How to Eat Less Salt  [place] its Salt?   Exercising Well with Heart Failure  Signs and symptoms of HF to report  Weight Yourself Each Day  Home Self Management- activity, weight tracking, taking medications as prescribed, meals /diet planning (sodium and fluid restriction), how to read food labels, keeping physician follow ups, smoking cessation, follow the Heart Failure Zones  The Heart Failure zones  Every Dose Every Day      Instructed  to call 911 if you have any of the following symptoms:       Struggling to breathe unrelieved with rest,     Having chest pain     Have confusion or cant think clearly      Katty Roberson MSN, RN  Heart Failure [address] ([facility]F) AHA GUIDELINES  (Must be completed for Primary Diagnosis CHF or History of CHF)    Discharge Plan:  I placed the Heart Failure Home Instructions in patient's discharge instructions. Per Heart Failure GWTG, the patient should have a follow-up appointment made within 7 days of discharge.     New Diagnosis No    ECHO Results most recent: 7/7/2022 EF 70-75%  Lab Results   Component Value Date    LVEF 73 07/07/2022                                        Social History     Tobacco Use   Smoking Status Never   Smokeless Tobacco Never        Immunization History   Administered Date(s) Administered    COVID-19, PFIZER GRAY top, DO NOT Dilute, (age 15 y+), IM, 30 mcg/0.3 mL 05/24/2022    Influenza Vaccine, unspecified formulation 10/15/2014    Influenza Virus Vaccine 10/31/2008, 10/05/2011    Influenza, High Dose (Fluzone 65 yrs and older) 09/22/2015, 11/21/2016, 09/19/2017, 09/25/2019    Influenza, Triv, inactivated, subunit, adjuvanted, IM (Fluad 65 yrs and older) 01/03/2019    Pneumococcal Conjugate 13-valent ([id number]) 05/27/2016    Pneumococcal Polysaccharide ([id number]) 12/21/2012, 01/29/2018    Td, unspecified formulation 03/11/2014    Tdap (Boostrix, Adacel) 09/30/2015    Zoster Live (Zostavax) 06/25/2013    Zoster Recombinant (Shingrix) 12/10/2019 Angiotensin-Converting-Enzyme (ACE) inhibitor ordered:  [] Yes  [x] No (specify contraindication):    [] Contraindicated  [] Hypotensive patient who was at immediate risk of cardiogenic shock  [] Hospitalized patient who experienced marked azotemia  [] Other Contraindications  [] Not Eligible  [x] Not Tolerant  [] Patient Reason  [] System Reason  [] Other Reason    Angiotensin II receptor blockers (ARB) ordered:  [] Yes  [x] No (specify contraindication):    [] Contraindicated  [] Hypotensive patient who was at immediate risk of cardiogenic shock  [] Hospitalized patient who experienced marked azotemia  [] Other Contraindications    ARNI - Angiotensin Receptor Neprilysin Inhibitor ordered:  [] Yes  [x] No (specify contraindication):    [] ACE inhibitor use within the prior 36 hours  [] Allergy  [] Hyperkalemia  [] Hypotension  [] Renal dysfunction defined as creatinine > 2.5 mg/dL in men or > 2.0 mg/dL in women  [] Other Contraindications  [x] Not Eligible  [] Not Tolerant  [] Patient Reason  []System Reason  []Other Reason      Beta Blocker ordered:    [x] Yes Toprol XL  [] No (specify contraindication):    [] Contraindicated  [] Asthma  [] Fluid Overload  [] Low Blood Pressure  [] Patient recently treated with an intravenous positive inotropic agent  [] Other Contraindications  [] Not Eligible  [] Not Tolerant  [] Patient Reason  [] System Reason    SGLT2 Inhibitor ordered:  [] Yes  [x] No (specify contraindication):    [] Contraindicated  [] Patient currently on dialysis  [] Ketoacidosis  [] Known hypersensitivity to the medication  [] Type I diabetes (not approved for use in patients with Type I diabetes due to increased risk of ketoacidosis)  [] Other Contraindications  [x] Not Eligible  [] Not Tolerant  [] Patient Reason  [] System Reason  [] Other Reason    Aldosterone Antagonist ordered:  [] Yes  [] No (specify contraindication):    [] Contraindicated  [] Allergy due to aldosterone receptor antagonist  [] Hyperkalemia  [] Renal dysfunction defined as creatinine >2.5 mg/dL in men or >2.0 mg/dL in women.   [] Other contraindications  [x] Not Eligible  [] Not Tolerant  [] Patient Reason  [] System Reason  [] Other Reason

## 2022-10-14 NOTE — PROGRESS NOTES
Pulmonary/Critical Care Progress Note    We are following patient for acute superimposed on chronic hypoxemic and hypercapnic respiratory failure, exacerbation of COPD, question right lower lobe pneumonia, alveolar hypoventilation, CKD, CHF, atrial fibrillation, diabetes mellitus type 2, morbid obesity, obstructive sleep apnea, history of Parkinson's disease      SUBJECTIVE:  Patient is bright alert and obviously even better today than she was yesterday. In fact, she does not need prednisone for as much as originally thought. Therefore, we will change her to Solu-Medrol 20 mg IV twice daily today and 20 mg prednisone starting tomorrow. We will continue her Zosyn and vancomycin for now. Chest x-ray will be ordered for Sunday. Overall progress is excellent as long as she uses her BiPAP every night without fail. I have explained this to the nursing staff and the care coordinator will know that the patient requires assistance at home without exceptions.     MEDICATIONS:   [START ON 10/15/2022] furosemide  40 mg Oral Daily    [START ON 10/15/2022] methylPREDNISolone  20 mg IntraVENous Q12H    Followed by    Eli Gary ON 10/16/2022] predniSONE  20 mg Oral Daily    piperacillin-tazobactam  3,375 mg IntraVENous Q8H    atorvastatin  40 mg Oral Nightly    vancomycin  1,250 mg IntraVENous Q24H    sodium chloride flush  5-40 mL IntraVENous 2 times per day    apixaban  5 mg Oral BID    carbidopa-levodopa  2 tablet Oral TID    insulin glargine  13 Units SubCUTAneous BID    metoprolol succinate  100 mg Oral BID    mirtazapine  15 mg Oral Nightly    montelukast  10 mg Oral Nightly    pantoprazole  40 mg Oral QAM    rOPINIRole  1 mg Oral TID    sucralfate  1 g Oral 4x Daily    insulin lispro  0-16 Units SubCUTAneous TID     insulin lispro  0-4 Units SubCUTAneous Nightly    Arformoterol Tartrate  15 mcg Nebulization BID    ipratropium-albuterol  1 ampule Inhalation Q4H    cefTRIAXone (ROCEPHIN) IV  1,000 mg IntraVENous Q24H azithromycin  500 mg IntraVENous Q24H      dextrose      sodium chloride       glucose, dextrose bolus **OR** dextrose bolus, glucagon (rDNA), dextrose, sodium chloride flush, sodium chloride, ondansetron **OR** ondansetron, polyethylene glycol, acetaminophen **OR** acetaminophen, docusate sodium, ipratropium-albuterol, LORazepam, loperamide, promethazine      REVIEW OF SYSTEMS:  Constitutional: Denies fever, weight loss, night sweats, and fatigue  Skin: Denies pigmentation, dark lesions, and rashes   HEENT: Denies hearing loss, tinnitus, ear drainage, epistaxis, sore throat, and hoarseness. Cardiovascular: Denies palpitations, chest pain, and chest pressure. Respiratory: Denies cough, dyspnea at rest, hemoptysis, apnea, and choking. Gastrointestinal: Denies nausea, vomiting, poor appetite, diarrhea, heartburn or reflux  Genitourinary: Denies dysuria, frequency, urgency or hematuria  Musculoskeletal: Denies myalgias, muscle weakness, and bone pain  Neurological: Denies dizziness, vertigo, headache, and focal weakness  Psychological: Denies anxiety and depression  Endocrine: Denies heat intolerance and cold intolerance  Hematopoietic/Lymphatic: Denies bleeding problems and blood transfusions    OBJECTIVE:  Vitals:    10/14/22 1502   BP: 108/77   Pulse: 86   Resp: 18   Temp: 98.2 °F (36.8 °C)   SpO2: 100%     FiO2 : 40 %  O2 Flow Rate (L/min): 4 L/min  O2 Device: Nasal cannula    PHYSICAL EXAM:  Constitutional: No fever, chills, diaphoresis  Skin: No skin rash, no skin breakdown  HEENT: Unremarkable  Neck: No JVD, lymphadenopathy, thyromegaly  Cardiovascular: S1, S2 irregular. Grade 1/6 to 2/6 systolic ejection murmur left sternal border  Respiratory: Few crackles right base, minimal end expiratory wheezes bilaterally  Gastrointestinal: Soft, obese, nontender  Genitourinary: No CVA tenderness  Extremities:, No clubbing, cyanosis, or edema  Neurological: Awake, alert, oriented x3.   No evidence of focal motor or sensory deficits  Psychological: In good spirits. Appropriate affect. Very pleased with her progress.     LABS:  WBC   Date Value Ref Range Status   10/14/2022 7.4 4.5 - 11.5 E9/L Final   10/13/2022 5.4 4.5 - 11.5 E9/L Final   10/12/2022 8.2 4.5 - 11.5 E9/L Final     Hemoglobin   Date Value Ref Range Status   10/14/2022 10.0 (L) 11.5 - 15.5 g/dL Final   10/13/2022 10.0 (L) 11.5 - 15.5 g/dL Final   10/12/2022 10.6 (L) 11.5 - 15.5 g/dL Final     Hematocrit   Date Value Ref Range Status   10/14/2022 34.8 34.0 - 48.0 % Final   10/13/2022 34.3 34.0 - 48.0 % Final   10/12/2022 36.2 34.0 - 48.0 % Final     MCV   Date Value Ref Range Status   10/14/2022 93.5 80.0 - 99.9 fL Final   10/13/2022 91.0 80.0 - 99.9 fL Final   10/12/2022 91.6 80.0 - 99.9 fL Final     Platelets   Date Value Ref Range Status   10/14/2022 269 130 - 450 E9/L Final   10/13/2022 233 130 - 450 E9/L Final   10/12/2022 282 130 - 450 E9/L Final     Sodium   Date Value Ref Range Status   10/14/2022 139 132 - 146 mmol/L Final   10/13/2022 141 132 - 146 mmol/L Final   10/12/2022 139 132 - 146 mmol/L Final     Potassium   Date Value Ref Range Status   10/14/2022 4.8 3.5 - 5.0 mmol/L Final   10/08/2022 4.0 3.5 - 5.0 mmol/L Final   10/07/2022 3.7 3.5 - 5.0 mmol/L Final     Potassium reflex Magnesium   Date Value Ref Range Status   10/13/2022 4.2 3.5 - 5.0 mmol/L Final   10/12/2022 4.3 3.5 - 5.0 mmol/L Final   10/05/2022 4.2 3.5 - 5.0 mmol/L Final     Chloride   Date Value Ref Range Status   10/14/2022 93 (L) 98 - 107 mmol/L Final   10/13/2022 99 98 - 107 mmol/L Final   10/12/2022 93 (L) 98 - 107 mmol/L Final     CO2   Date Value Ref Range Status   10/14/2022 38 (H) 22 - 29 mmol/L Final   10/13/2022 32 (H) 22 - 29 mmol/L Final   10/12/2022 39 (H) 22 - 29 mmol/L Final     BUN   Date Value Ref Range Status   10/14/2022 27 (H) 6 - 23 mg/dL Final   10/13/2022 19 6 - 23 mg/dL Final   10/12/2022 19 6 - 23 mg/dL Final     Creatinine   Date Value Ref Range Status 10/14/2022 1.1 (H) 0.5 - 1.0 mg/dL Final   10/13/2022 0.9 0.5 - 1.0 mg/dL Final   10/12/2022 0.9 0.5 - 1.0 mg/dL Final     Glucose   Date Value Ref Range Status   10/14/2022 334 (H) 74 - 99 mg/dL Final   10/13/2022 215 (H) 74 - 99 mg/dL Final   10/12/2022 207 (H) 74 - 99 mg/dL Final     Calcium   Date Value Ref Range Status   10/14/2022 8.8 8.6 - 10.2 mg/dL Final   10/13/2022 8.1 (L) 8.6 - 10.2 mg/dL Final   10/12/2022 9.2 8.6 - 10.2 mg/dL Final     Total Protein   Date Value Ref Range Status   10/14/2022 6.3 (L) 6.4 - 8.3 g/dL Final   10/13/2022 5.6 (L) 6.4 - 8.3 g/dL Final   10/08/2022 4.8 (L) 6.4 - 8.3 g/dL Final     Albumin   Date Value Ref Range Status   10/14/2022 3.5 3.5 - 5.2 g/dL Final   10/13/2022 3.1 (L) 3.5 - 5.2 g/dL Final   10/08/2022 2.5 (L) 3.5 - 5.2 g/dL Final     Total Bilirubin   Date Value Ref Range Status   10/14/2022 0.3 0.0 - 1.2 mg/dL Final   10/13/2022 0.3 0.0 - 1.2 mg/dL Final   10/08/2022 0.4 0.0 - 1.2 mg/dL Final     Alkaline Phosphatase   Date Value Ref Range Status   10/14/2022 98 35 - 104 U/L Final   10/13/2022 87 35 - 104 U/L Final   10/08/2022 74 35 - 104 U/L Final     AST   Date Value Ref Range Status   10/14/2022 7 0 - 31 U/L Final   10/13/2022 12 0 - 31 U/L Final     Comment:     Specimen is slightly Hemolyzed. Result may be artificially increased. 10/08/2022 7 0 - 31 U/L Final     ALT   Date Value Ref Range Status   10/14/2022 <5 0 - 32 U/L Final   10/13/2022 <5 0 - 32 U/L Final   10/08/2022 5 0 - 32 U/L Final     GFR Non-   Date Value Ref Range Status   10/14/2022 49 >=60 mL/min/1.73 Final     Comment:     Chronic Kidney Disease: less than 60 ml/min/1.73 sq.m. Kidney Failure: less than 15 ml/min/1.73 sq.m. Results valid for patients 18 years and older. 10/13/2022 >60 >=60 mL/min/1.73 Final     Comment:     Chronic Kidney Disease: less than 60 ml/min/1.73 sq.m. Kidney Failure: less than 15 ml/min/1.73 sq.m.   Results valid for patients 18 years and older. 10/12/2022 >60 >=60 mL/min/1.73 Final     Comment:     Chronic Kidney Disease: less than 60 ml/min/1.73 sq.m. Kidney Failure: less than 15 ml/min/1.73 sq.m. Results valid for patients 18 years and older. GFR    Date Value Ref Range Status   10/14/2022 59  Final   10/13/2022 >60  Final   10/12/2022 >60  Final     Magnesium   Date Value Ref Range Status   10/14/2022 2.1 1.6 - 2.6 mg/dL Final   10/08/2022 1.9 1.6 - 2.6 mg/dL Final   10/07/2022 1.8 1.6 - 2.6 mg/dL Final     Phosphorus   Date Value Ref Range Status   10/08/2022 3.0 2.5 - 4.5 mg/dL Final   10/07/2022 2.8 2.5 - 4.5 mg/dL Final   10/06/2022 2.6 2.5 - 4.5 mg/dL Final     Recent Labs     10/12/22  2353   HCO3 34.2*   BE 7.9*   O2SAT 88.2*       RADIOLOGY:  XR CHEST PORTABLE   Final Result   Right lower lobe opacity possibly representing atelectasis. CT CHEST W CONTRAST   Final Result   1. Interlobular septal thickening in the upper lungs, bilateral pleural   effusions greater on the right, 4 chamber cardiac enlargement, pericardial   effusion and hepatosplenomegaly. Trace right upper quadrant perihepatic   ascites. Findings suggest volume overload/CHF. 2.  Partial passive atelectasis of the lower lobes, right middle lobe and   lingular segment. 3.  Prominent main pulmonary artery, consider pulmonary artery hypertension. No filling defects in the pulmonary artery to suggest embolism. 4.  Postop changes in the right axilla and right breast.         XR CHEST (2 VW)   Final Result   Cardiomegaly with persistent interstitial and alveolar pulmonary edema which   is most pronounced on the right. PROBLEM LIST:  Principal Problem:    COPD exacerbation ([address]  Active Problems:    Acute on chronic respiratory failure with hypercapnia (HCC)    Persistent atrial fibrillation (HCC)  Resolved Problems:    * No resolved hospital problems.  *      IMPRESSION:  Acute, superimposed on chronic hypoxemic and hypercapnic respiratory failure  Morbid obesity  Obstructive sleep apnea  Alveolar hypoventilation syndrome  Diabetes mellitus type 2  HFpEF  Possible right lower lobe pneumonia  Severe pulmonary hypertension with an element of right heart failure  Atrial fibrillation on Eliquis with controlled ventricular response  Chronic kidney disease  Small bilateral pleural effusions  Severe COPD with exacerbation  Cor pulmonale    PLAN:  Continue antibiotics  Taper steroids-see orders and discussion above  BiPAP nightly without exception  Continue inhaled bronchodilators and inhaled steroids  Chest x-ray 10/16/2022  PT and OT are essential and will continue      Electronically signed by Jennifer Sandoval MD on 10/14/2022 at 4:30 PM

## 2022-10-14 NOTE — DISCHARGE INSTRUCTIONS
***HEART FAILURE - CONGESTIVE HEART FAILURE***  DISCHARGE INSTRUCTIONS:  GUIDELINES TO FOLLOW AT [facility]/SNF/ Assisted Living    Future Appointments   Date Time Provider Prateek Garciai   10/18/2022  2:00 PM [facility] CHF ROOM 1 [facility]   11/16/2022  2:30 PM Ubaldo Severin, MD [facility] CARDIO [facility]          MEDICATIONS:  Please notify the doctor if patient is not able to take their medications or if medications are being held for any reasons (such as low blood pressure ect.)  Do not give the patient ibuprofen (Advil or Motrin), naproxen (Aleve) without talking to the doctor first. This could make their heart failure worse. WEIGHT MONITORING:   Weigh patient every day in the morning after they void (If patient is able to stand, please get a standing weight.)   Notify the doctor of a weight gain of 3 pounds or more in 1 day   OR  a total of 5 pounds or more in 1 week             DIET   Cardiac heart healthy diet:  Low sodium diet: no  more than 2,000mg (2 grams) of salt / sodium per day (which equals to a little less than  a teaspoon of salt)/ Cardiac Diet: Low saturated / low trans fat, no added salt, caffeine restricted    If patient is there for rehab and will be returning home in the near future; reinforce with the patient and the family to follow a low sodium diet (2,000 mg)- avoid using salt at the table, avoid / limit use of canned soups, processed / packaged foods, salted snacks, olives and pickles. Do not use a salt substitute without checking with the doctor. (Mrs. Wayne Khan is safe to use).        NOTIFY THE DOCTOR THE FIRST DAY OF ONSET OF ANY OF THESE   SYMPTOMS:   Weight gain of 3 pounds or more in 1 day         OR 5 pounds or more in one week  More shortness of breath  More swelling in stomach, legs, ankles or feet  Feeling more tired, No energy  Dry hacky cough  Dizziness  More chest pain / discomfort  Hard time breathing laying down

## 2022-10-14 NOTE — PROGRESS NOTES
Nutrition Assessment     Type and Reason for Visit: Initial, Positive Nutrition Screen    Nutrition Recommendations/Plan:   Continue current diet & monitor. Nutrition Assessment:  Pt admits from SNF w/ SOB, Dx:  Acute on chronic hypoxic & hypercapnic resp. failure/ COPD exacerbation. Hx DM, CHF, CA Breast), A-fib, CKD, CAD, Parkinsons, Aphasia. Pt is currently on Reg consistency diet  w/ nectar thick lq. Pt appetite is good w/ no significant wt loss, expect wt fluc d/t fluid status/CHF. Continue current diet &  monitor. Nutrition Related Findings:   A/O x4, obese/soft/rounded/nontender abd +BS flatus, I/O -WDL, trace non-pitting BUE & +1 BLE edema. Elevated renal labs GFR 49, , A1C 6.1 (8/2022) Wound Type: None    Current Nutrition Therapies:    ADULT DIET; Regular; 3 carb choices (45 gm/meal); Low Sodium (2 gm); Mildly Thick ([place])    Anthropometric Measures:  Height: 5' (152.4 cm)  Current Body Wt: 211 lb (95.7 kg) (10/12 stated,  UTO current wt.)   BMI: 41.2    Nutrition Diagnosis:   No nutrition diagnosis at this time     Nutrition Interventions:   Food and/or Nutrient Delivery: Continue Current Diet  Nutrition Education/Counseling: No recommendation at this time  Coordination of Nutrition Care: Continue to monitor while inpatient     Goals:     Goals: PO intake 75% or greater     Nutrition Monitoring and Evaluation:   Behavioral-Environmental Outcomes: None Identified  Food/Nutrient Intake Outcomes: Food and Nutrient Intake  Physical Signs/Symptoms Outcomes: Biochemical Data, Chewing or Swallowing, Fluid Status or Edema, Weight, Skin, Nutrition Focused Physical Findings    Discharge Planning:     Too soon to determine     Mily Guaman RD  Contact: [phone]

## 2022-10-14 NOTE — PROGRESS NOTES
Physician Progress Note      PATIENT:               Selwyn Meyer  [facility] #:                  [id number]  :                       1950  ADMIT DATE:       10/12/2022 4:06 PM  DISCH DATE:  RESPONDING  PROVIDER #:        Yosef Shields DO        QUERY TEXT:    Stage of Chronic Kidney Disease: Please provide further specificity, if known. Clinical indicators include: chronic kidney disease, cr, bun, creatinine, gfr,   brain natriuretic peptide, pro-bnp, ckd, kd  Options provided:  -- Chronic kidney disease stage 1  -- Chronic kidney disease stage 2  -- Chronic kidney disease stage 3  -- Chronic kidney disease stage 3a  -- Chronic kidney disease stage 3b  -- Chronic kidney disease stage 4  -- Chronic kidney disease stage 5  -- Chronic kidney disease stage 5, requiring dialysis  -- End stage renal disease  -- Other - I will add my own diagnosis  -- Disagree - Not applicable / Not valid  -- Disagree - Clinically Unable to determine / Unknown        PROVIDER RESPONSE TEXT:    The patient has chronic kidney disease stage 3a.       Electronically signed by:  Yosef Shields DO 10/14/2022 3:39 PM

## 2022-10-14 NOTE — PLAN OF CARE
Patient's chart updated to reflect:      . - HF care plan, HF education points and HF discharge instructions.  -Orders: 2 gram sodium diet, daily weights, I/O.  -PCP and cardiology follow up appointments to be scheduled within 7 days of hospital discharge. -CHF education session will be provided to the patient prior to hospital discharge.     Loren LAZO, RN  Heart Failure Navigator

## 2022-10-14 NOTE — DISCHARGE INSTR - COC
Continuity of Care Form    Patient Name: Aleta Olivia   :    MRN:  [id number]    Admit date:  10/12/2022  Discharge date:  ***    Code Status Order: Full Code   Advance Directives:     Admitting Physician:  Kaitlin Rashid DO  PCP: Kaitlin Rashid DO    Discharging Nurse: [facility] Unit/Room#: 0690/8325-24  Discharging Unit Phone Number: ***    Emergency Contact:   Extended Emergency Contact Information  Primary Emergency Contact: Erich Villafuerte [address] Phone: [phone]  Mobile Phone: [phone]  Relation: Child   needed? No  Secondary Emergency Contact: Lilliana Pedroza  [place] Phone: [phone]  Mobile Phone: [phone]  Relation: Child   needed?  No    Past Surgical History:  Past Surgical History:   Procedure Laterality Date    BREAST LUMPECTOMY      BREAST REDUCTION SURGERY      CARDIAC CATHETERIZATION  2014    Dr. Wolfgang Green  2022    Dr. Smita Vázquez  7/29/15    CT GUIDED CHEST TUBE  2022    CT GUIDED CHEST TUBE 2022 Kell Cantrell MD [facility]    ENDOSCOPY, COLON, DIAGNOSTIC  7/19/15    GALLBLADDER SURGERY      LUMBAR LAMINECTOMY      TOE AMPUTATION      TONSILLECTOMY      UPPER GASTROINTESTINAL ENDOSCOPY      UPPER GASTROINTESTINAL ENDOSCOPY N/A 2022    EGD ESOPHAGOGASTRODUODENOSCOPY performed by Jay Whitehead MD at [address] [place] [address]       Immunization History:   Immunization History   Administered Date(s) Administered    COVID-19, PFIZER GRAY top, DO NOT Dilute, (age 15 y+), IM, 30 mcg/0.3 mL 2022    Influenza Vaccine, unspecified formulation 10/15/2014    Influenza Virus Vaccine 10/31/2008, 10/05/2011    Influenza, High Dose (Fluzone 65 yrs and older) 2015, 2016, 2017, 2019    Influenza, Triv, inactivated, subunit, adjuvanted, IM (Fluad 65 yrs and older) 2019    Pneumococcal Conjugate 13-valent (Jennifer League) 2016 Pneumococcal Polysaccharide ([id number]) 12/21/2012, 01/29/2018    Td, unspecified formulation 03/11/2014    Tdap (Boostrix, Adacel) 09/30/2015    Zoster Live (Zostavax) 06/25/2013    Zoster Recombinant (Shingrix) 12/10/2019       Active Problems:  Patient Active Problem List   Diagnosis Code    Depression with anxiety F41.8    Osteoporosis M81.0    Asthma J45.909    Hyperlipidemia E78.5    Mitral regurgitation I34.0    Obstructive sleep apnea syndrome G47.33    Psoriasis L40.9    Diabetes mellitus (HCC) E11.9    Parkinson's disease ([facility] G20    Primary hypertension I10    Microalbuminuria R80.9    Morbid obesity ([facility] E66.01    RLS (restless legs syndrome) G25.81    Generalized weakness R53.1    Inability to walk R26.2    Hypothyroidism E03.9    Chest pain R07.9    Acute asthma exacerbation J45.901    Asthma exacerbation, mild J45.901    Paroxysmal atrial fibrillation ([facility]) I48.0    Atrial fibrillation with RVR ([facility]) I48.91    Acute on chronic congestive heart failure ([facility]) I50.9    Coronary artery disease involving native coronary artery of native heart without angina pectoris I25.10    Dysphagia R13.10    Hepatosplenomegaly R16.2    Acute decompensated heart failure (HCC) N17.9    Acute diastolic (congestive) heart failure ([facility]) I50.31    Nonrheumatic tricuspid valve regurgitation I36.1    Tobacco abuse Z72.0    Recurrent syncope R55    Septic shock ([facility]) A41.9, R65.21    Seizure-like activity ([facility]) R56.9    SHANTANU (acute kidney injury) ([facility] N17.9    Pancytopenia (HCC) D61.818    Thrombocytopenia ([facility]) D69.6    Encephalopathy G93.40    Acute respiratory failure with hypoxia ([facility]) J96.01    Lactic acidosis E87.20    Delirium R41.0    Acute on chronic anemia D64.9    Pleural effusion, right J90    Acute respiratory failure with hypoxia and hypercapnia ([facility]) J96.01, J96.02    Acute confusion R41.0    Chronic diastolic (congestive) heart failure (HCC) I50.32    Macrocytosis D75.89    Other specified anemias D64.89 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, 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    Puerperal sepsis with acute hypoxic respiratory failure without septic shock ([facility]) O85, R65.20, J96.01    Pulmonary HTN ([facility]) I27.20    Chronic anticoagulation Z79.01    RVF (right ventricular failure) ([facility]) E51.338    Metabolic alkalosis G58.5    Sepsis ([facility]) A41.9    COPD exacerbation ([facility]) J44.1    Acute on chronic respiratory failure with hypercapnia ([facility]) J96.22    Persistent atrial fibrillation ([facility]) I48.19       Isolation/Infection:   Isolation            No Isolation          Patient Infection Status       Infection Onset Added Last Indicated Last Indicated By Review Planned Expiration Resolved Resolved By    None active    Resolved    COVID-19 (Rule Out) 10/12/22 10/12/22 10/12/22 COVID-19, Rapid (Ordered)   10/12/22 Rule-Out Test Resulted    COVID-19 (Rule Out) 10/05/22 10/05/22 10/05/22 Respiratory Panel, Molecular, with COVID-19 (Restricted: peds pts or suitable admitted adults) (Ordered)   10/05/22 Rule-Out Test Resulted    COVID-19 (Rule Out) 10/05/22 10/05/22 10/05/22 COVID-19, Rapid (Ordered)   10/05/22 Rule-Out Test Resulted    COVID-19 (Rule Out) 08/24/22 08/24/22 08/25/22 Respiratory Panel, Molecular, with COVID-19 (Restricted: peds pts or suitable admitted adults) (Ordered)   08/25/22 Rule-Out Test Resulted    COVID-19 (Rule Out) 07/16/22 07/16/22 07/16/22 Respiratory Panel, Molecular, with COVID-19 (Restricted: peds pts or suitable admitted adults) (Ordered)   07/16/22 Rule-Out Test Resulted    COVID-19 (Rule Out) 07/05/22 07/05/22 07/06/22 Respiratory Panel, Molecular, with COVID-19 (Restricted: peds pts or suitable admitted adults) (Ordered)   07/06/22 Rule-Out Test Resulted    COVID-19 (Rule Out) 05/11/22 05/11/22 05/11/22 COVID-19 & Influenza Combo (Ordered)   05/11/22 Rule-Out Test Resulted    COVID-19 (Rule Out) 03/17/22 03/17/22 03/17/22 Respiratory Panel, Molecular, with COVID-19 (Restricted: peds pts or suitable admitted adults) (Ordered)   03/17/22 Rule-Out Test Resulted    COVID-19 (Rule Out) 03/16/22 03/16/22 03/16/22 COVID-19, Rapid (Ordered)   03/16/22 Rule-Out Test Resulted    C-diff Rule Out 01/10/22 01/10/22 01/10/22 C. difficile toxin Molecular (Ordered)   03/17/22 [place]    Canceled  Myron Mcdermott RN 1/11/22 1300     COVID-19 (Rule Out) 01/09/22 01/09/22 01/09/22 COVID-19, Rapid (Ordered)   01/09/22 Rule-Out Test Resulted    MRSA 05/19/21 05/21/21 05/19/21 Culture, Wound   10/25/21 Anastasiya Costello RN    COVID-19 (Rule Out) 02/04/21 02/04/21 02/04/21 COVID-19 (Ordered)   02/04/21 Rule-Out Test Resulted            Nurse Assessment:  Last Vital Signs: /77   Pulse 86   Temp 98.2 °F (36.8 °C) (Oral)   Resp 18   Ht 5' (1.524 m)   Wt 211 lb (95.7 kg)   SpO2 100%   BMI 41.21 kg/m²     Last documented pain score (0-10 scale): Pain Level: 0  Last Weight:   Wt Readings from Last 1 Encounters:   10/12/22 211 lb (95.7 kg)     Mental Status:  oriented, alert, coherent, logical, thought processes intact, and able to concentrate and follow conversation    IV Access:      Nursing Mobility/ADLs:  Walking   Assisted  Transfer  Assisted  Bathing  Assisted  Dressing  Assisted  Toileting  Assisted  Feeding  [address] Delivery   prefers mixed with pudding    Wound Care Documentation and Therapy:  Incision 10/05/22 Toe (Comment  which one) Right (Active)   Dressing/Treatment Open to air 10/13/22 2013   Number of days: 8        Elimination:  Continence: Bowel: Yes  Bladder: Yes  Urinary Catheter: None   Colostomy/Ileostomy/Ileal Conduit: No       Date of Last BM: 10/22/2022      Intake/Output Summary (Last 24 hours) at 10/14/2022 1640  Last data filed at 10/14/2022 1113  Gross per 24 hour   Intake --   Output 1150 ml   Net -1150 ml     I/O last 3 completed shifts: In: 250 [IV Piggyback:250]  Out: 1000 [Urine:1000]    Safety Concerns:      At Risk for Falls    Impairments/Disabilities: None    Nutrition Therapy:  Current Nutrition Therapy:   - Oral Diet:  Carb Control 4 carbs/meal (1800kcals/day)    Routes of Feeding: Oral  Liquids: Honey Thick Liquids  Daily Fluid Restriction: no  Last Modified Barium Swallow with Video (Video Swallowing Test): not done    Treatments at the Time of [place] discharge:   Respiratory Treatments: Oxygen  Oxygen Therapy:  is on oxygen at 2 L/min per nasal cannula. Ventilator:    - No ventilator support    Rehab Therapies: Physical Therapy and Occupational Therapy  Weight Bearing Status/Restrictions: No weight bearing restrictions  Other Medical Equipment (for information only, NOT a DME order):  walker  Other Treatments: O2      Patient's personal belongings (please select all that are sent with patient):  Glasses, cell phone    RN SIGNATURE:  Mine Vazquez Rn    CASE MANAGEMENT/SOCIAL WORK SECTION    Inpatient Status Date: 10/12/22    Readmission Risk Assessment Score:  Readmission Risk              Risk of Unplanned Readmission:  59           Discharging to Facility/ Agency   Name: [facility]  Address: [address] [place] [address]  Phone: [phone]    Dialysis Facility (if applicable)   Name:  Address:  Dialysis Schedule:  Phone:  Fax:    / signature: Electronically signed by Eliza Brooks RN on 10/21/2022 at 11:31 AM      PHYSICIAN SECTION    Prognosis: Fair    Condition at Discharge: Stable    Rehab Potential (if transferring to Rehab): Good    Recommended Labs or Other Treatments After Discharge: ***    Physician Certification: I certify the above information and transfer of Alvarez Galvez  is necessary for the continuing treatment of the diagnosis listed and that she requires [facility] for {GREATER/LESS:318591649} 30 days.      Update Admission H&P: {CHP DME Changes in JGIZX:[id number]}    PHYSICIAN SIGNATURE:  Electronically signed by Salbador Hashimoto, DO on 10/19/22 at 11:44 AM EDT    ***HEART FAILURE - CONGESTIVE HEART FAILURE***  DISCHARGE INSTRUCTIONS:  GUIDELINES TO FOLLOW AT [facility]/SNF/ Assisted Living    Future Appointments   Date Time Provider Prateek Nassar   10/18/2022  2:00 PM [facility] CHF ROOM 1 [facility]   11/16/2022  2:30 PM Devora Egan MD [facility] CARDIO [facility]          MEDICATIONS:  Please notify the doctor if patient is not able to take their medications or if medications are being held for any reasons (such as low blood pressure ect.)  Do not give the patient ibuprofen (Advil or Motrin), naproxen (Aleve) without talking to the doctor first. This could make their heart failure worse. WEIGHT MONITORING:   Weigh patient every day in the morning after they void (If patient is able to stand, please get a standing weight.)   Notify the doctor of a weight gain of 3 pounds or more in 1 day   OR  a total of 5 pounds or more in 1 week             DIET   Cardiac heart healthy diet:  Low sodium diet: no  more than 2,000mg (2 grams) of salt / sodium per day (which equals to a little less than  a teaspoon of salt)/ Cardiac Diet: Low saturated / low trans fat, no added salt, caffeine restricted    If patient is there for rehab and will be returning home in the near future; reinforce with the patient and the family to follow a low sodium diet (2,000 mg)- avoid using salt at the table, avoid / limit use of canned soups, processed / packaged foods, salted snacks, olives and pickles. Do not use a salt substitute without checking with the doctor. (Mrs. Claudia Terry is safe to use).        NOTIFY THE DOCTOR THE FIRST DAY OF ONSET OF ANY OF THESE   SYMPTOMS:   Weight gain of 3 pounds or more in 1 day         OR 5 pounds or more in one week  More shortness of breath  More swelling in stomach, legs, ankles or feet  Feeling more tired, No energy  Dry hacky cough  Dizziness  More chest pain / discomfort  Hard time breathing laying down

## 2022-10-14 NOTE — PROGRESS NOTES
Patient is seen in follow-up for CHF. Subjective:     Ms. Cassandra Javier feels better today  Sitting up in bed no apparent distress    ROS:  CONSTITUTIONAL:  negative for  fevers, chills  HEENT:  negative for earaches, nasal congestion and epistaxis  RESPIRATORY:  negative for  dry cough, cough with sputum,wheezing and hemoptysis  GASTROINTESTINAL:  negative for nausea, vomiting  MUSCULOSKELETAL:  negative for  myalgias, arthralgias  NEUROLOGICAL:  negative for visual disturbance, dysphagia    Medication side effects: none    Scheduled Meds:   piperacillin-tazobactam  3,375 mg IntraVENous Q8H    atorvastatin  40 mg Oral Nightly    vancomycin  1,250 mg IntraVENous Q24H    methylPREDNISolone  40 mg IntraVENous Q12H    Followed by    Dariela Potts ON 10/16/2022] predniSONE  40 mg Oral Daily    sodium chloride flush  5-40 mL IntraVENous 2 times per day    apixaban  5 mg Oral BID    carbidopa-levodopa  2 tablet Oral TID    insulin glargine  13 Units SubCUTAneous BID    metoprolol succinate  100 mg Oral BID    mirtazapine  15 mg Oral Nightly    montelukast  10 mg Oral Nightly    pantoprazole  40 mg Oral QAM    rOPINIRole  1 mg Oral TID    sucralfate  1 g Oral 4x Daily    bumetanide  1 mg IntraVENous BID    insulin lispro  0-16 Units SubCUTAneous TID WC    insulin lispro  0-4 Units SubCUTAneous Nightly    Arformoterol Tartrate  15 mcg Nebulization BID    ipratropium-albuterol  1 ampule Inhalation Q4H    cefTRIAXone (ROCEPHIN) IV  1,000 mg IntraVENous Q24H    azithromycin  500 mg IntraVENous Q24H     Continuous Infusions:   sodium chloride       PRN Meds:sodium chloride flush, sodium chloride, ondansetron **OR** ondansetron, polyethylene glycol, acetaminophen **OR** acetaminophen, docusate sodium, ipratropium-albuterol, LORazepam, loperamide, promethazine    I/O last 3 completed shifts:   In: 250 [IV Piggyback:250]  Out: 1000 [Urine:1000]  I/O this shift:  In: -   Out: 150 [Urine:150]      Objective:      Physical Exam:   BP (!) 141/92 Pulse 88   Temp 98.2 °F (36.8 °C) (Oral)   Resp 20   Ht 5' (1.524 m)   Wt 211 lb (95.7 kg)   SpO2 100%   BMI 41.21 kg/m²   CONSTITUTIONAL:  awake, alert, cooperative, no apparent distress, and appears stated age  HEAD:  normocepalic, without obvious abnormality, atraumatic  NECK:  Supple, symmetrical, trachea midline, no adenopathy, thyroid symmetric, not enlarged and no tenderness, skin normal  LUNGS:  No increased work of breathing, No accessory muscle use or intercostal retractions, decreased air exchange, bilateral rales  CARDIOVASCULAR:  Normal apical impulse, irregularly irregular, normal S1 and S2, no S3, 3/6 systolic murmur at the left lower sternal border,, no edema, no JVD, no carotid bruit. ABDOMEN:  Soft, nontender, no masses, no hepatomegaly, no splenomegaly, BS+  MUSCULOSKELETAL:  No clubbing no cyanosis. there is no redness, warmth, or swelling of the joints  full range of motion noted  NEUROLOGIC:  Alert, awake,oriented x3  SKIN:  no bruising or bleeding, normal skin color, texture, turgor and no redness, warmth, or swelling      Cardiographics  I personally reviewed the telemetry monitor strips with the following interpretation: Atrial fibrillation with controlled ventricular response    Echocardiogram: 7/7/2022,Summary   Technically difficult study - limited visualization. Micro-bubble contrast injected to enhance left ventricular visualization. Normal left ventricular size and systolic function. Ejection fraction is visually estimated at 70-75%. Indeterminate diastolic function. No regional wall motion abnormalities seen. Mild left ventricular concentric hypertrophy noted. Moderately dilated right ventricle with reduced function. Biatrial dilation. Mild tricuspid regurgitation. RVSP is at least 60 mmHg. Prominent pericardial fat pad. No definitive evidence of pericardial effusion.     Imaging  XR CHEST PORTABLE   Final Result   Right lower lobe opacity possibly representing atelectasis. CT CHEST W CONTRAST   Final Result   1. Interlobular septal thickening in the upper lungs, bilateral pleural   effusions greater on the right, 4 chamber cardiac enlargement, pericardial   effusion and hepatosplenomegaly. Trace right upper quadrant perihepatic   ascites. Findings suggest volume overload/CHF. 2.  Partial passive atelectasis of the lower lobes, right middle lobe and   lingular segment. 3.  Prominent main pulmonary artery, consider pulmonary artery hypertension. No filling defects in the pulmonary artery to suggest embolism. 4.  Postop changes in the right axilla and right breast.         XR CHEST (2 VW)   Final Result   Cardiomegaly with persistent interstitial and alveolar pulmonary edema which   is most pronounced on the right. Lab Review   Lab Results   Component Value Date/Time     10/16/2022 09:20 AM    K 4.3 10/16/2022 09:20 AM    K 4.2 10/13/2022 05:46 AM    CL 93 10/16/2022 09:20 AM    CO2 39 10/16/2022 09:20 AM    BUN 29 10/16/2022 09:20 AM    CREATININE 0.9 10/16/2022 09:20 AM    GLUCOSE 230 10/16/2022 09:20 AM    CALCIUM 8.9 10/16/2022 09:20 AM     Lab Results   Component Value Date/Time    WBC 7.1 10/16/2022 09:20 AM    HGB 9.9 10/16/2022 09:20 AM    HCT 33.8 10/16/2022 09:20 AM    MCV 92.6 10/16/2022 09:20 AM     10/16/2022 09:20 AM     I have personally reviewed the laboratory, cardiac diagnostic and radiographic testing as outlined above:    Assessment:     1. Acute exacerbation of congestive heart failure: Decompensated, improving, will continue diuresis  2. Acute hypoxic respiratory failure: Suspect multifactorial, as above. 3.  Atrial fibrillation: Persistent, controlled ventricular response, continue current treatment  4. Elevated troponin: Precath  5. CAD: Nonobstructive involving LAD and left circumflex artery  6. Tricuspid valve regurgitation: Mild  7. Pulmonary hypertension: Moderate  8. Hypertension: Controlled  9. Hyperlipidemia  10. Type 2 diabetes mellitus  11. History of beta-blocker and amiodarone induced bradycardia  12. History of digitoxicity      Recommendations:     1. Will discontinue IV Bumex  2. Lasix 40 mg 1 by mouth daily  3. Continue the rest of medications  4. Intake and output  5. Basic metabolic panel and CBC in a.m.  6.  Further cardiac recommendations will be forthcoming pending her clinical course and diagnostic test findings    Discussed with patient and family at bedside    Electronically signed by Franklin Cao MD on 10/14/2022 at 3:10 PM  NOTE: This report was transcribed using voice recognition software.  Every effort was made to ensure accuracy; however, inadvertent computerized transcription errors may be present

## 2022-10-14 NOTE — CARE COORDINATION
10/14/22 1830 CM note: COVID (-) 10/12/22. 4L nc vs bipap FIO2 40%. Met with pt at the bedside with her daughter Jenise Abel on pts speaker phone. Pts son is POA but lives out of town and has been assisting pt with her decision making. Pt came to us from [facility] as a long term bedhold and would like to return to this facility at discharge. Per Merle, [facility] liaison, pt has bedhold days through Sunday- after that they can take her back pending bed availability. No precert needed to return. Pt also has hx SNF at [facility] and [address] [place] [address] St. CM will follow.  Electronically signed by Liane Del Cid RN on 10/14/2022 at 6:34 PM

## 2022-10-14 NOTE — PROGRESS NOTES
Pharmacy Consultation Note  (Antibiotic Dosing and Monitoring)    Initial consult date: 10/13/2022  Consulting physician/provider: Dr. Eduardo Charles  Drug: Vancomycin  Indication: \"respiratory\"    Age/  Gender Height Weight IBW  Allergy Information   71 y.o./female 5' (152.4 cm) 211 lb (95.7 kg)     Ideal body weight: 45.5 kg (100 lb 4.9 oz)  Adjusted ideal body weight: 65.6 kg (144 lb 9.4 oz)   Ciprofloxacin and Codeine      Renal Function:  Recent Labs     10/12/22  1659 10/13/22  0546 10/14/22  0801   BUN 19 19 27*   CREATININE 0.9 0.9 1.1*       Intake/Output Summary (Last 24 hours) at 10/14/2022 1426  Last data filed at 10/14/2022 1113  Gross per 24 hour   Intake --   Output 1150 ml   Net -1150 ml     Vancomycin Monitoring:  Trough:  No results for input(s): VANCOTROUGH in the last 72 hours. Random:    Recent Labs     10/14/22  0801   VANCORANDOM <4.0*       Recent Labs     10/12/22  1659   BLOODCULT2 24 Hours no growth        Historical Cultures:  Organism   Date Value Ref Range Status   10/05/2022 Escherichia coli (A)  Final     Recent Labs     10/12/22  1659   BC 24 Hours no growth     Recent vancomycin administrations        No vancomycin IV orders with administrations found. Orders not given:            vancomycin (VANCOCIN) 1,250 mg in dextrose 5 % 250 mL IVPB                  Assessment:  Patient is a 66 y.o. female who has been initiated on vancomycin  Estimated Creatinine Clearance: 48 mL/min (A) (based on SCr of 1.1 mg/dL (H)). To dose vancomycin, pharmacy will be utilizing [employer] calculation software for goal AUC/DARELL 400-600 mg/L-hr  Vancomycin not given as ordered yesterday, unclear why.  Checked a random level today to confirm it was not administered which resulted as <4 mcg/mL    Plan:  Vancomycin 1250 mg IV q24h to start today (dose is due now) (est AUC/DARELL 531 mg/L.hr, est Trough 14.6 mcg/mL)  Will check vancomycin levels when appropriate  Will continue to monitor renal function Pharmacy to follow    Aileen Tong PharmD, BCPS 10/14/2022 2:26 PM   Ext: [id number]

## 2022-10-15 LAB
METER GLUCOSE: 301 MG/DL (ref 74–99)
METER GLUCOSE: 333 MG/DL (ref 74–99)
METER GLUCOSE: 339 MG/DL (ref 74–99)
METER GLUCOSE: 343 MG/DL (ref 74–99)

## 2022-10-15 PROCEDURE — 94640 AIRWAY INHALATION TREATMENT: CPT

## 2022-10-15 PROCEDURE — 6370000000 HC RX 637 (ALT 250 FOR IP): Performed by: FAMILY MEDICINE

## 2022-10-15 PROCEDURE — 6360000002 HC RX W HCPCS: Performed by: FAMILY MEDICINE

## 2022-10-15 PROCEDURE — 6370000000 HC RX 637 (ALT 250 FOR IP): Performed by: CLINICAL NURSE SPECIALIST

## 2022-10-15 PROCEDURE — 6360000002 HC RX W HCPCS: Performed by: INTERNAL MEDICINE

## 2022-10-15 PROCEDURE — 2700000000 HC OXYGEN THERAPY PER DAY

## 2022-10-15 PROCEDURE — 2580000003 HC RX 258: Performed by: INTERNAL MEDICINE

## 2022-10-15 PROCEDURE — 82962 GLUCOSE BLOOD TEST: CPT

## 2022-10-15 PROCEDURE — 2580000003 HC RX 258: Performed by: FAMILY MEDICINE

## 2022-10-15 PROCEDURE — 6370000000 HC RX 637 (ALT 250 FOR IP): Performed by: NURSE PRACTITIONER

## 2022-10-15 PROCEDURE — 2580000003 HC RX 258: Performed by: NURSE PRACTITIONER

## 2022-10-15 PROCEDURE — 94660 CPAP INITIATION&MGMT: CPT

## 2022-10-15 PROCEDURE — 6370000000 HC RX 637 (ALT 250 FOR IP): Performed by: INTERNAL MEDICINE

## 2022-10-15 PROCEDURE — 2060000000 HC ICU INTERMEDIATE R&B

## 2022-10-15 PROCEDURE — 6360000002 HC RX W HCPCS: Performed by: NURSE PRACTITIONER

## 2022-10-15 PROCEDURE — 99233 SBSQ HOSP IP/OBS HIGH 50: CPT | Performed by: INTERNAL MEDICINE

## 2022-10-15 RX ADMIN — WATER 1000 MG: 1 INJECTION INTRAMUSCULAR; INTRAVENOUS; SUBCUTANEOUS at 22:45

## 2022-10-15 RX ADMIN — PIPERACILLIN AND TAZOBACTAM 3375 MG: 3; .375 INJECTION, POWDER, FOR SOLUTION INTRAVENOUS at 09:18

## 2022-10-15 RX ADMIN — CARBIDOPA AND LEVODOPA 2 TABLET: 25; 100 TABLET, EXTENDED RELEASE ORAL at 09:32

## 2022-10-15 RX ADMIN — PIPERACILLIN AND TAZOBACTAM 3375 MG: 3; .375 INJECTION, POWDER, FOR SOLUTION INTRAVENOUS at 18:00

## 2022-10-15 RX ADMIN — AZITHROMYCIN DIHYDRATE 500 MG: 500 INJECTION, POWDER, LYOPHILIZED, FOR SOLUTION INTRAVENOUS at 00:11

## 2022-10-15 RX ADMIN — INSULIN GLARGINE 13 UNITS: 100 INJECTION, SOLUTION SUBCUTANEOUS at 09:38

## 2022-10-15 RX ADMIN — INSULIN LISPRO 4 UNITS: 100 INJECTION, SOLUTION INTRAVENOUS; SUBCUTANEOUS at 22:23

## 2022-10-15 RX ADMIN — ARFORMOTEROL TARTRATE 15 MCG: 15 SOLUTION RESPIRATORY (INHALATION) at 06:21

## 2022-10-15 RX ADMIN — INSULIN LISPRO 12 UNITS: 100 INJECTION, SOLUTION INTRAVENOUS; SUBCUTANEOUS at 11:59

## 2022-10-15 RX ADMIN — CARBIDOPA AND LEVODOPA 2 TABLET: 25; 100 TABLET, EXTENDED RELEASE ORAL at 22:26

## 2022-10-15 RX ADMIN — MIRTAZAPINE 15 MG: 15 TABLET, ORALLY DISINTEGRATING ORAL at 22:25

## 2022-10-15 RX ADMIN — FUROSEMIDE 40 MG: 40 TABLET ORAL at 09:32

## 2022-10-15 RX ADMIN — SUCRALFATE 1 G: 1 TABLET ORAL at 18:05

## 2022-10-15 RX ADMIN — VANCOMYCIN HYDROCHLORIDE 1250 MG: 10 INJECTION, POWDER, LYOPHILIZED, FOR SOLUTION INTRAVENOUS at 14:20

## 2022-10-15 RX ADMIN — ARFORMOTEROL TARTRATE 15 MCG: 15 SOLUTION RESPIRATORY (INHALATION) at 17:31

## 2022-10-15 RX ADMIN — MONTELUKAST 10 MG: 10 TABLET, FILM COATED ORAL at 22:25

## 2022-10-15 RX ADMIN — INSULIN LISPRO 12 UNITS: 100 INJECTION, SOLUTION INTRAVENOUS; SUBCUTANEOUS at 09:34

## 2022-10-15 RX ADMIN — IPRATROPIUM BROMIDE AND ALBUTEROL SULFATE 3 ML: .5; 2.5 SOLUTION RESPIRATORY (INHALATION) at 06:21

## 2022-10-15 RX ADMIN — ROPINIROLE HYDROCHLORIDE 1 MG: 1 TABLET, FILM COATED ORAL at 09:42

## 2022-10-15 RX ADMIN — METOPROLOL SUCCINATE 100 MG: 50 TABLET, EXTENDED RELEASE ORAL at 09:33

## 2022-10-15 RX ADMIN — IPRATROPIUM BROMIDE AND ALBUTEROL SULFATE 1 AMPULE: .5; 2.5 SOLUTION RESPIRATORY (INHALATION) at 14:09

## 2022-10-15 RX ADMIN — ROPINIROLE HYDROCHLORIDE 1 MG: 1 TABLET, FILM COATED ORAL at 14:17

## 2022-10-15 RX ADMIN — IPRATROPIUM BROMIDE AND ALBUTEROL SULFATE 1 AMPULE: .5; 2.5 SOLUTION RESPIRATORY (INHALATION) at 10:18

## 2022-10-15 RX ADMIN — INSULIN GLARGINE 13 UNITS: 100 INJECTION, SOLUTION SUBCUTANEOUS at 22:24

## 2022-10-15 RX ADMIN — WATER 1000 MG: 1 INJECTION INTRAMUSCULAR; INTRAVENOUS; SUBCUTANEOUS at 00:02

## 2022-10-15 RX ADMIN — CARBIDOPA AND LEVODOPA 2 TABLET: 25; 100 TABLET, EXTENDED RELEASE ORAL at 14:17

## 2022-10-15 RX ADMIN — APIXABAN 5 MG: 5 TABLET, FILM COATED ORAL at 09:31

## 2022-10-15 RX ADMIN — Medication 10 ML: at 22:46

## 2022-10-15 RX ADMIN — SUCRALFATE 1 G: 1 TABLET ORAL at 22:25

## 2022-10-15 RX ADMIN — SUCRALFATE 1 G: 1 TABLET ORAL at 12:01

## 2022-10-15 RX ADMIN — METHYLPREDNISOLONE SODIUM SUCCINATE 20 MG: 40 INJECTION, POWDER, FOR SOLUTION INTRAMUSCULAR; INTRAVENOUS at 18:03

## 2022-10-15 RX ADMIN — APIXABAN 5 MG: 5 TABLET, FILM COATED ORAL at 22:25

## 2022-10-15 RX ADMIN — PIPERACILLIN AND TAZOBACTAM 3375 MG: 3; .375 INJECTION, POWDER, FOR SOLUTION INTRAVENOUS at 01:15

## 2022-10-15 RX ADMIN — METOPROLOL SUCCINATE 100 MG: 50 TABLET, EXTENDED RELEASE ORAL at 22:25

## 2022-10-15 RX ADMIN — ATORVASTATIN CALCIUM 40 MG: 40 TABLET, FILM COATED ORAL at 22:25

## 2022-10-15 RX ADMIN — INSULIN LISPRO 12 UNITS: 100 INJECTION, SOLUTION INTRAVENOUS; SUBCUTANEOUS at 16:53

## 2022-10-15 RX ADMIN — PANTOPRAZOLE SODIUM 40 MG: 40 TABLET, DELAYED RELEASE ORAL at 09:31

## 2022-10-15 RX ADMIN — AZITHROMYCIN DIHYDRATE 500 MG: 500 INJECTION, POWDER, LYOPHILIZED, FOR SOLUTION INTRAVENOUS at 22:53

## 2022-10-15 RX ADMIN — SUCRALFATE 1 G: 1 TABLET ORAL at 09:31

## 2022-10-15 RX ADMIN — METHYLPREDNISOLONE SODIUM SUCCINATE 20 MG: 40 INJECTION, POWDER, FOR SOLUTION INTRAMUSCULAR; INTRAVENOUS at 05:00

## 2022-10-15 RX ADMIN — ROPINIROLE HYDROCHLORIDE 1 MG: 1 TABLET, FILM COATED ORAL at 22:25

## 2022-10-15 RX ADMIN — IPRATROPIUM BROMIDE AND ALBUTEROL SULFATE 1 AMPULE: .5; 2.5 SOLUTION RESPIRATORY (INHALATION) at 17:31

## 2022-10-15 ASSESSMENT — ENCOUNTER SYMPTOMS
NAUSEA: 0
SHORTNESS OF BREATH: 1
VOMITING: 0

## 2022-10-15 ASSESSMENT — PAIN SCALES - GENERAL
PAINLEVEL_OUTOF10: 0
PAINLEVEL_OUTOF10: 0

## 2022-10-15 NOTE — PROGRESS NOTES
Pulmonary/Critical Care Progress Note    We are following patient for acute superimposed on chronic hypoxemic and hypercapnic respiratory failure, exacerbation of COPD, question right lower lobe pneumonia, alveolar hypoventilation, CKD, CHF, atrial fibrillation, diabetes mellitus type 2, morbid obesity, obstructive sleep apnea, history of Parkinson's disease      SUBJECTIVE:  Patient continues to improve. She is less short of breath. Heart rate is much better controlled. Continues to endorse exertional and conversational dyspnea.     MEDICATIONS:   furosemide  40 mg Oral Daily    methylPREDNISolone  20 mg IntraVENous Q12H    Followed by    Guero Bowen ON 10/16/2022] predniSONE  20 mg Oral Daily    piperacillin-tazobactam  3,375 mg IntraVENous Q8H    atorvastatin  40 mg Oral Nightly    vancomycin  1,250 mg IntraVENous Q24H    sodium chloride flush  5-40 mL IntraVENous 2 times per day    apixaban  5 mg Oral BID    carbidopa-levodopa  2 tablet Oral TID    insulin glargine  13 Units SubCUTAneous BID    metoprolol succinate  100 mg Oral BID    mirtazapine  15 mg Oral Nightly    montelukast  10 mg Oral Nightly    pantoprazole  40 mg Oral QAM    rOPINIRole  1 mg Oral TID    sucralfate  1 g Oral 4x Daily    insulin lispro  0-16 Units SubCUTAneous TID WC    insulin lispro  0-4 Units SubCUTAneous Nightly    Arformoterol Tartrate  15 mcg Nebulization BID    ipratropium-albuterol  1 ampule Inhalation Q4H    cefTRIAXone (ROCEPHIN) IV  1,000 mg IntraVENous Q24H    azithromycin  500 mg IntraVENous Q24H      dextrose      sodium chloride       glucose, dextrose bolus **OR** dextrose bolus, glucagon (rDNA), dextrose, sodium chloride flush, sodium chloride, ondansetron **OR** ondansetron, polyethylene glycol, acetaminophen **OR** acetaminophen, docusate sodium, LORazepam, loperamide, promethazine      REVIEW OF SYSTEMS:  Constitutional: Denies fever, weight loss, night sweats, and fatigue  Skin: Denies pigmentation, dark lesions, and rashes   HEENT: Denies hearing loss, tinnitus, ear drainage, epistaxis, sore throat, and hoarseness. Cardiovascular: Denies palpitations, chest pain, and chest pressure. Respiratory: Denies cough, dyspnea at rest, hemoptysis, apnea, and choking. Gastrointestinal: Denies nausea, vomiting, poor appetite, diarrhea, heartburn or reflux  Genitourinary: Denies dysuria, frequency, urgency or hematuria  Musculoskeletal: Denies myalgias, muscle weakness, and bone pain  Neurological: Denies dizziness, vertigo, headache, and focal weakness  Psychological: Denies anxiety and depression  Endocrine: Denies heat intolerance and cold intolerance  Hematopoietic/Lymphatic: Denies bleeding problems and blood transfusions    OBJECTIVE:  Vitals:    10/15/22 0933   BP: 110/60   Pulse: 66   Resp:    Temp:    SpO2:      FiO2 : 40 %  O2 Flow Rate (L/min): 4 L/min  O2 Device: Nasal cannula    PHYSICAL EXAM:  Constitutional: No fever, chills, diaphoresis  Skin: No skin rash, no skin breakdown  HEENT: Unremarkable  Neck: No JVD, lymphadenopathy, thyromegaly  Cardiovascular: S1, S2 irregular. Grade 1/6 to 2/6 systolic ejection murmur left sternal border  Respiratory: Few crackles right base, minimal end expiratory wheezes bilaterally  Gastrointestinal: Soft, obese, nontender  Genitourinary: No CVA tenderness  Extremities:, No clubbing, cyanosis, or edema  Neurological: Awake, alert, oriented x3. No evidence of focal motor or sensory deficits  Psychological: In good spirits. Appropriate affect. Very pleased with her progress.     LABS:  WBC   Date Value Ref Range Status   10/14/2022 7.4 4.5 - 11.5 E9/L Final   10/13/2022 5.4 4.5 - 11.5 E9/L Final   10/12/2022 8.2 4.5 - 11.5 E9/L Final     Hemoglobin   Date Value Ref Range Status   10/14/2022 10.0 (L) 11.5 - 15.5 g/dL Final   10/13/2022 10.0 (L) 11.5 - 15.5 g/dL Final   10/12/2022 10.6 (L) 11.5 - 15.5 g/dL Final     Hematocrit   Date Value Ref Range Status   10/14/2022 34.8 34.0 - 48.0 % Final   10/13/2022 34.3 34.0 - 48.0 % Final   10/12/2022 36.2 34.0 - 48.0 % Final     MCV   Date Value Ref Range Status   10/14/2022 93.5 80.0 - 99.9 fL Final   10/13/2022 91.0 80.0 - 99.9 fL Final   10/12/2022 91.6 80.0 - 99.9 fL Final     Platelets   Date Value Ref Range Status   10/14/2022 269 130 - 450 E9/L Final   10/13/2022 233 130 - 450 E9/L Final   10/12/2022 282 130 - 450 E9/L Final     Sodium   Date Value Ref Range Status   10/14/2022 139 132 - 146 mmol/L Final   10/13/2022 141 132 - 146 mmol/L Final   10/12/2022 139 132 - 146 mmol/L Final     Potassium   Date Value Ref Range Status   10/14/2022 4.8 3.5 - 5.0 mmol/L Final   10/08/2022 4.0 3.5 - 5.0 mmol/L Final   10/07/2022 3.7 3.5 - 5.0 mmol/L Final     Potassium reflex Magnesium   Date Value Ref Range Status   10/13/2022 4.2 3.5 - 5.0 mmol/L Final   10/12/2022 4.3 3.5 - 5.0 mmol/L Final   10/05/2022 4.2 3.5 - 5.0 mmol/L Final     Chloride   Date Value Ref Range Status   10/14/2022 93 (L) 98 - 107 mmol/L Final   10/13/2022 99 98 - 107 mmol/L Final   10/12/2022 93 (L) 98 - 107 mmol/L Final     CO2   Date Value Ref Range Status   10/14/2022 38 (H) 22 - 29 mmol/L Final   10/13/2022 32 (H) 22 - 29 mmol/L Final   10/12/2022 39 (H) 22 - 29 mmol/L Final     BUN   Date Value Ref Range Status   10/14/2022 27 (H) 6 - 23 mg/dL Final   10/13/2022 19 6 - 23 mg/dL Final   10/12/2022 19 6 - 23 mg/dL Final     Creatinine   Date Value Ref Range Status   10/14/2022 1.1 (H) 0.5 - 1.0 mg/dL Final   10/13/2022 0.9 0.5 - 1.0 mg/dL Final   10/12/2022 0.9 0.5 - 1.0 mg/dL Final     Glucose   Date Value Ref Range Status   10/14/2022 334 (H) 74 - 99 mg/dL Final   10/13/2022 215 (H) 74 - 99 mg/dL Final   10/12/2022 207 (H) 74 - 99 mg/dL Final     Calcium   Date Value Ref Range Status   10/14/2022 8.8 8.6 - 10.2 mg/dL Final   10/13/2022 8.1 (L) 8.6 - 10.2 mg/dL Final   10/12/2022 9.2 8.6 - 10.2 mg/dL Final     Total Protein   Date Value Ref Range Status   10/14/2022 6.3 (L) 6.4 - 8.3 g/dL Final   10/13/2022 5.6 (L) 6.4 - 8.3 g/dL Final   10/08/2022 4.8 (L) 6.4 - 8.3 g/dL Final     Albumin   Date Value Ref Range Status   10/14/2022 3.5 3.5 - 5.2 g/dL Final   10/13/2022 3.1 (L) 3.5 - 5.2 g/dL Final   10/08/2022 2.5 (L) 3.5 - 5.2 g/dL Final     Total Bilirubin   Date Value Ref Range Status   10/14/2022 0.3 0.0 - 1.2 mg/dL Final   10/13/2022 0.3 0.0 - 1.2 mg/dL Final   10/08/2022 0.4 0.0 - 1.2 mg/dL Final     Alkaline Phosphatase   Date Value Ref Range Status   10/14/2022 98 35 - 104 U/L Final   10/13/2022 87 35 - 104 U/L Final   10/08/2022 74 35 - 104 U/L Final     AST   Date Value Ref Range Status   10/14/2022 7 0 - 31 U/L Final   10/13/2022 12 0 - 31 U/L Final     Comment:     Specimen is slightly Hemolyzed. Result may be artificially increased. 10/08/2022 7 0 - 31 U/L Final     ALT   Date Value Ref Range Status   10/14/2022 <5 0 - 32 U/L Final   10/13/2022 <5 0 - 32 U/L Final   10/08/2022 5 0 - 32 U/L Final     GFR Non-   Date Value Ref Range Status   10/14/2022 49 >=60 mL/min/1.73 Final     Comment:     Chronic Kidney Disease: less than 60 ml/min/1.73 sq.m. Kidney Failure: less than 15 ml/min/1.73 sq.m. Results valid for patients 18 years and older. 10/13/2022 >60 >=60 mL/min/1.73 Final     Comment:     Chronic Kidney Disease: less than 60 ml/min/1.73 sq.m. Kidney Failure: less than 15 ml/min/1.73 sq.m. Results valid for patients 18 years and older. 10/12/2022 >60 >=60 mL/min/1.73 Final     Comment:     Chronic Kidney Disease: less than 60 ml/min/1.73 sq.m. Kidney Failure: less than 15 ml/min/1.73 sq.m. Results valid for patients 18 years and older.        GFR    Date Value Ref Range Status   10/14/2022 59  Final   10/13/2022 >60  Final   10/12/2022 >60  Final     Magnesium   Date Value Ref Range Status   10/14/2022 2.1 1.6 - 2.6 mg/dL Final   10/08/2022 1.9 1.6 - 2.6 mg/dL Final   10/07/2022 1.8 1.6 - 2.6 mg/dL Final Phosphorus   Date Value Ref Range Status   10/08/2022 3.0 2.5 - 4.5 mg/dL Final   10/07/2022 2.8 2.5 - 4.5 mg/dL Final   10/06/2022 2.6 2.5 - 4.5 mg/dL Final     Recent Labs     10/12/22  2353   HCO3 34.2*   BE 7.9*   O2SAT 88.2*       RADIOLOGY:  XR CHEST PORTABLE   Final Result   Right lower lobe opacity possibly representing atelectasis. CT CHEST W CONTRAST   Final Result   1. Interlobular septal thickening in the upper lungs, bilateral pleural   effusions greater on the right, 4 chamber cardiac enlargement, pericardial   effusion and hepatosplenomegaly. Trace right upper quadrant perihepatic   ascites. Findings suggest volume overload/CHF. 2.  Partial passive atelectasis of the lower lobes, right middle lobe and   lingular segment. 3.  Prominent main pulmonary artery, consider pulmonary artery hypertension. No filling defects in the pulmonary artery to suggest embolism. 4.  Postop changes in the right axilla and right breast.         XR CHEST (2 VW)   Final Result   Cardiomegaly with persistent interstitial and alveolar pulmonary edema which   is most pronounced on the right. PROBLEM LIST:  Principal Problem:    COPD exacerbation ([address]  Active Problems:    Acute on chronic respiratory failure with hypercapnia (HCC)    Persistent atrial fibrillation (HCC)  Resolved Problems:    * No resolved hospital problems.  *      IMPRESSION:  Acute, superimposed on chronic hypoxemic and hypercapnic respiratory failure  Morbid obesity  Obstructive sleep apnea  Alveolar hypoventilation syndrome  Diabetes mellitus type 2  HFpEF  Possible right lower lobe pneumonia  Severe pulmonary hypertension with an element of right heart failure  Atrial fibrillation on Eliquis with controlled ventricular response  Chronic kidney disease  Small bilateral pleural effusions  Severe COPD with exacerbation  Cor pulmonale    PLAN:  Continue antibiotics  Taper steroid  BiPAP nightly without exception  Continue inhaled bronchodilators and inhaled steroids  Chest x-ray 10/16/2022  PT and OT are essential and will continue      Electronically signed by William Balbuena MD on 10/15/2022 at 2:07 PM

## 2022-10-15 NOTE — PROGRESS NOTES
10.0* 10.0*   HCT 36.2 34.3 34.8   MCV 91.6 91.0 93.5   RDW 15.2* 15.8* 15.6*    233 269     CHEMISTRIES:  Recent Labs     10/12/22  1659 10/13/22  0546 10/14/22  0801    141 139   K 4.3 4.2 4.8   CL 93* 99 93*   CO2 39* 32* 38*   BUN 19 19 27*   CREATININE 0.9 0.9 1.1*   GLUCOSE 207* 215* 334*   MG  --   --  2.1     PT/INR:No results for input(s): PROTIME, INR in the last 72 hours. APTT:No results for input(s): APTT in the last 72 hours. LIVER PROFILE:  Recent Labs     10/13/22  0546 10/14/22  0801   AST 12 7   ALT <5 <5   BILITOT 0.3 0.3   ALKPHOS 87 98       Imaging Last 24 Hours:  CT CHEST W CONTRAST    Result Date: 10/13/2022  EXAMINATION: CT OF THE CHEST WITH CONTRAST 10/13/2022 11:00 am TECHNIQUE: CT of the chest was performed with the administration of intravenous contrast. Multiplanar reformatted images are provided for review. Automated exposure control, iterative reconstruction, and/or weight based adjustment of the mA/kV was utilized to reduce the radiation dose to as low as reasonably achievable. COMPARISON: 07/05/2022 HISTORY: ORDERING SYSTEM PROVIDED HISTORY: SOB TECHNOLOGIST PROVIDED HISTORY: Reason for exam:->SOB FINDINGS: Mediastinum: Main pulmonary artery is prominent at 3.6 cm. Mild 4 chamber cardiac enlargement. Small pericardial effusion. Superior inferior vena cava appear mildly distended. Moderately dense ASVD of the left coronary vessels. No mediastinal mass or bulky adenopathy. Aorta appears to be within normal limits. Lungs/pleura: There is a moderate to large right pleural effusion and a small left effusion. Interlobular septal thickening in the upper lobes suggesting mild pulmonary edema. Probable passive atelectasis in the lower lobes and right middle lobe. Passive atelectasis in the lingular segment. No endobronchial lesions identified. Upper Abdomen: Hepatosplenomegaly. Right adrenal gland is normal.  Clips are noted in the gallbladder fossa.   Trace perihepatic ascites. Soft Tissues/Bones: Multiple peripherally calcified regions within the subcutaneous tissues of the right breast and clips in the right axilla. Insert normal bones     1. Interlobular septal thickening in the upper lungs, bilateral pleural effusions greater on the right, 4 chamber cardiac enlargement, pericardial effusion and hepatosplenomegaly. Trace right upper quadrant perihepatic ascites. Findings suggest volume overload/CHF. 2.  Partial passive atelectasis of the lower lobes, right middle lobe and lingular segment. 3.  Prominent main pulmonary artery, consider pulmonary artery hypertension. No filling defects in the pulmonary artery to suggest embolism. 4.  Postop changes in the right axilla and right breast.     XR CHEST PORTABLE    Result Date: 10/14/2022  EXAMINATION: ONE XRAY VIEW OF THE CHEST 10/14/2022 5:38 am COMPARISON: 10/12/2022 HISTORY: ORDERING SYSTEM PROVIDED HISTORY: SOB TECHNOLOGIST PROVIDED HISTORY: Reason for exam:->SOB FINDINGS: Right lower lobe opacity. There is no effusion or pneumothorax. Stable cardiomegaly. The osseous structures are without acute process. Right lower lobe opacity possibly representing atelectasis. Assessment//Plan           Hospital Problems             Last Modified POA    * (Principal) COPD exacerbation ([address] 10/12/2022 Yes    Acute on chronic respiratory failure with hypercapnia (HCC) 10/13/2022 Yes    Persistent atrial fibrillation ([address] 10/13/2022 Yes     Assessment:    Condition: In stable condition. Improving. (Doing well with antibiotics and solumedrol and diuresis    Improving overall). Plan:   Continue respiratory treatments. Consults: cardiology and pulmonology. Regular diet. Chest x-ray. Administer medications as ordered. (Continue IV diuresis  Continue IV antibiotics  Steroid taper  PT  Will follow).      Electronically signed by Tamia Stanley DO on 10/15/22 at 8:50 AM EDT

## 2022-10-15 NOTE — PROGRESS NOTES
Pharmacy Consultation Note  (Antibiotic Dosing and Monitoring)    Initial consult date: 10/13/2022  Consulting physician/provider: Dr. Butch Mcdonough  Drug: Vancomycin  Indication: \"respiratory\"    Age/  Gender Height Weight IBW  Allergy Information   71 y.o./female 5' (152.4 cm) 211 lb (95.7 kg)     Ideal body weight: 45.5 kg (100 lb 4.9 oz)  Adjusted ideal body weight: 65.6 kg (144 lb 9.4 oz)   Ciprofloxacin and Codeine      Renal Function:  Recent Labs     10/12/22  1659 10/13/22  0546 10/14/22  0801   BUN 19 19 27*   CREATININE 0.9 0.9 1.1*         Intake/Output Summary (Last 24 hours) at 10/15/2022 1145  Last data filed at 10/14/2022 1918  Gross per 24 hour   Intake 300 ml   Output 500 ml   Net -200 ml       Vancomycin Monitoring:  Trough:  No results for input(s): VANCOTROUGH in the last 72 hours. Random:    Recent Labs     10/14/22  0801   VANCORANDOM <4.0*         Recent Labs     10/12/22  1659   BLOODCULT2 24 Hours no growth          Historical Cultures:  Organism   Date Value Ref Range Status   10/05/2022 Escherichia coli (A)  Final     Recent Labs     10/12/22  1659   BC 24 Hours no growth       Vancomycin Administration Times:  Recent vancomycin administrations                     vancomycin (VANCOCIN) 1,250 mg in dextrose 5 % 250 mL IVPB (mg) 1,250 mg New Bag 10/14/22 1453               Assessment:  Patient is a 66 y.o. female who has been initiated on vancomycin  Estimated Creatinine Clearance: 48 mL/min (A) (based on SCr of 1.1 mg/dL (H)). To dose vancomycin, pharmacy will be utilizing yourdelivery calculation software for goal AUC/DARELL 400-600 mg/L-hr  Vancomycin not given as ordered yesterday, unclear why.  Checked a random level today to confirm it was not administered which resulted as <4 mcg/mL    Plan:  Vancomycin 1250 mg IV q24h  Random level tomorrow morning  Will continue to monitor renal function   Pharmacy to follow    Sam Robison PharmD 10/15/2022 11:46 AM   [phone]

## 2022-10-15 NOTE — PROGRESS NOTES
Patient is seen in follow-up for atrial fibrillation, CHF    Date of service: 10/15/2022    Subjective:   No new overnight cardiac complaints. No chest pain or palpitations. Respiratory status improved since admission per patient. AF at rate 70's-80's on telemetry. I/O's inaccurate (urine output more than documented).     ROS:  Cardiac: As per HPI  General: No fever, chills  Pulmonary: As per HPI  HEENT: No visual disturbances, difficult swallowing  GI: No nausea, vomiting  : No dysuria, hematuria  Endocrine: +hypothyroidism, +DM  Musculoskeletal: HDZ x 4, no focal motor deficits  Skin: Intact, no rashes  Neuro: No headache, seizures  Psych: Currently with no depression, anxiety    Scheduled Meds:   furosemide  40 mg Oral Daily    methylPREDNISolone  20 mg IntraVENous Q12H    Followed by    Zuleima Tam ON 10/16/2022] predniSONE  20 mg Oral Daily    piperacillin-tazobactam  3,375 mg IntraVENous Q8H    atorvastatin  40 mg Oral Nightly    vancomycin  1,250 mg IntraVENous Q24H    sodium chloride flush  5-40 mL IntraVENous 2 times per day    apixaban  5 mg Oral BID    carbidopa-levodopa  2 tablet Oral TID    insulin glargine  13 Units SubCUTAneous BID    metoprolol succinate  100 mg Oral BID    mirtazapine  15 mg Oral Nightly    montelukast  10 mg Oral Nightly    pantoprazole  40 mg Oral QAM    rOPINIRole  1 mg Oral TID    sucralfate  1 g Oral 4x Daily    insulin lispro  0-16 Units SubCUTAneous TID WC    insulin lispro  0-4 Units SubCUTAneous Nightly    Arformoterol Tartrate  15 mcg Nebulization BID    ipratropium-albuterol  1 ampule Inhalation Q4H    cefTRIAXone (ROCEPHIN) IV  1,000 mg IntraVENous Q24H    azithromycin  500 mg IntraVENous Q24H     Continuous Infusions:   dextrose      sodium chloride       PRN Meds:glucose, dextrose bolus **OR** dextrose bolus, glucagon (rDNA), dextrose, sodium chloride flush, sodium chloride, ondansetron **OR** ondansetron, polyethylene glycol, acetaminophen **OR** acetaminophen, docusate sodium, LORazepam, loperamide, promethazine    I/O last 3 completed shifts: In: 300 [P.O.:300]  Out: 1650 [Urine:1650]  No intake/output data recorded. Objective:      Physical Exam:   /60   Pulse 66   Temp 97.5 °F (36.4 °C) (Oral)   Resp 18   Ht 5' (1.524 m)   Wt 211 lb (95.7 kg)   SpO2 100%   BMI 41.21 kg/m²   Appearance: Awake, alert and oriented x 3, no acute respiratory distress  Skin: Intact, no rash  Head: Normocephalic, atraumatic  Eyes: EOMI, no conjunctival erythema  ENMT: No pharyngeal erythema, MMM, no rhinorrhea, no dentures  Neck: Supple, no carotid bruits  Lungs: Decreased BS B/L, no wheezing  Cardiac: IRRR, no murmurs apparent  Abdomen: Soft, nontender, +bowel sounds  Extremities: Moves all extremities x 4, no lower extremity edema  Neurologic: No focal motor deficits apparent, normal mood and affect, alert and oriented x 3    Imaging  XR CHEST PORTABLE   Final Result   Right lower lobe opacity possibly representing atelectasis. CT CHEST W CONTRAST   Final Result   1. Interlobular septal thickening in the upper lungs, bilateral pleural   effusions greater on the right, 4 chamber cardiac enlargement, pericardial   effusion and hepatosplenomegaly. Trace right upper quadrant perihepatic   ascites. Findings suggest volume overload/CHF. 2.  Partial passive atelectasis of the lower lobes, right middle lobe and   lingular segment. 3.  Prominent main pulmonary artery, consider pulmonary artery hypertension. No filling defects in the pulmonary artery to suggest embolism. 4.  Postop changes in the right axilla and right breast.         XR CHEST (2 VW)   Final Result   Cardiomegaly with persistent interstitial and alveolar pulmonary edema which   is most pronounced on the right.              Lab Review   Lab Results   Component Value Date    WBC 7.4 10/14/2022    HGB 10.0 (L) 10/14/2022    HCT 34.8 10/14/2022    MCV 93.5 10/14/2022     10/14/2022     Lab Results   Component Value Date    CREATININE 1.1 (H) 10/14/2022    BUN 27 (H) 10/14/2022     10/14/2022    K 4.8 10/14/2022    CL 93 (L) 10/14/2022    CO2 38 (H) 10/14/2022     Lab Results   Component Value Date    TSH 8.350 (H) 09/27/2022     Lab Results   Component Value Date    LABA1C 6.1 (H) 08/24/2022     No results found for: EAG    Lab Results   Component Value Date    DIGOXIN 1.6 10/06/2022     Telemetry personally reviewed (10/15/2022): AF, rate 70's-80's    Cardiac catheterization: 1/11/22 (Dr. Adryan Cho)  Left main: 0%  stenosis  LAD: 50 %  stenosis  Circumflex: 50 %   stenosis  RCA: Co dominant. 0 %  stenosis  LV angio: 75-80%  ejection fraction    Echocardiogram: 7/7/22 (Dr. Gail Crawley)   Technically difficult study - limited visualization. Micro-bubble contrast injected to enhance left ventricular visualization. Normal left ventricular size and systolic function. Ejection fraction is visually estimated at 70-75%. Indeterminate diastolic function. No regional wall motion abnormalities seen. Mild left ventricular concentric hypertrophy noted. Moderately dilated right ventricle with reduced function. Biatrial dilation. Mild tricuspid regurgitation. RVSP is at least 60 mmHg. Prominent pericardial fat pad. No definitive evidence of pericardial effusion. Assessment:     1. Persistent atrial fibrillation -- multiple prior CVN's and recurrence of AF --> rate controlled  2. Acute on chronic hypoxic/hypercapnic respiratory failure  3. Acute on chronic diastolic congestive heart failure  4. Bilateral pleural effusion -- s/p right sided thoracentesis (900 cc) on 8/26/22  5. CAD: Nonobstructive involving LAD and left circumflex artery  7. Tricuspid valve regurgitation: Mild  8. Pulmonary hypertension  9. Hypertension: Controlled  10. Hyperlipidemia  11. Type 2 diabetes mellitus  12. Chronic anemia -- most recent Hgb 10.0  13.  Chronic OAC with eliquis  14, SERENA -- noncompliant with treatment  15. Tobacco abuse / COPD  16. BMI 45.4 --> 41.2     Recommendations:     Monitor BMP and I/O's with ongoing diuresis  Increased BB dose and switched to Toprol XL in 8/2022 --> current dose 100 mg BID (continue)  Continue eliquis 5 mg BID  Aggressive risk factor modifications / treatment of SERENA as indicated  Telemetry reviewed (rate controlled AF)    Greater than 35 minutes was spent counseling the patient, reviewing the rationale for the above recommendations and reviewing the patient's current medication list, problem list and results of all previously ordered testing.     Rios Morejon MD  [facility]) Cardiology

## 2022-10-16 LAB
ANION GAP SERPL CALCULATED.3IONS-SCNC: 7 MMOL/L (ref 7–16)
BASOPHILS ABSOLUTE: 0.02 E9/L (ref 0–0.2)
BASOPHILS RELATIVE PERCENT: 0.3 % (ref 0–2)
BUN BLDV-MCNC: 29 MG/DL (ref 6–23)
CALCIUM SERPL-MCNC: 8.9 MG/DL (ref 8.6–10.2)
CHLORIDE BLD-SCNC: 93 MMOL/L (ref 98–107)
CO2: 39 MMOL/L (ref 22–29)
CREAT SERPL-MCNC: 0.9 MG/DL (ref 0.5–1)
EOSINOPHILS ABSOLUTE: 0 E9/L (ref 0.05–0.5)
EOSINOPHILS RELATIVE PERCENT: 0 % (ref 0–6)
GFR AFRICAN AMERICAN: >60
GFR NON-AFRICAN AMERICAN: >60 ML/MIN/1.73
GLUCOSE BLD-MCNC: 230 MG/DL (ref 74–99)
HCT VFR BLD CALC: 33.8 % (ref 34–48)
HEMOGLOBIN: 9.9 G/DL (ref 11.5–15.5)
IMMATURE GRANULOCYTES #: 0.17 E9/L
IMMATURE GRANULOCYTES %: 2.4 % (ref 0–5)
LYMPHOCYTES ABSOLUTE: 1.04 E9/L (ref 1.5–4)
LYMPHOCYTES RELATIVE PERCENT: 14.7 % (ref 20–42)
MCH RBC QN AUTO: 27.1 PG (ref 26–35)
MCHC RBC AUTO-ENTMCNC: 29.3 % (ref 32–34.5)
MCV RBC AUTO: 92.6 FL (ref 80–99.9)
METER GLUCOSE: 224 MG/DL (ref 74–99)
METER GLUCOSE: 236 MG/DL (ref 74–99)
METER GLUCOSE: 311 MG/DL (ref 74–99)
METER GLUCOSE: 388 MG/DL (ref 74–99)
MONOCYTES ABSOLUTE: 0.27 E9/L (ref 0.1–0.95)
MONOCYTES RELATIVE PERCENT: 3.8 % (ref 2–12)
NEUTROPHILS ABSOLUTE: 5.59 E9/L (ref 1.8–7.3)
NEUTROPHILS RELATIVE PERCENT: 78.8 % (ref 43–80)
PDW BLD-RTO: 15.8 FL (ref 11.5–15)
PLATELET # BLD: 247 E9/L (ref 130–450)
PMV BLD AUTO: 10.7 FL (ref 7–12)
POTASSIUM SERPL-SCNC: 4.3 MMOL/L (ref 3.5–5)
RBC # BLD: 3.65 E12/L (ref 3.5–5.5)
SODIUM BLD-SCNC: 139 MMOL/L (ref 132–146)
VANCOMYCIN RANDOM: 11.3 MCG/ML (ref 5–40)
WBC # BLD: 7.1 E9/L (ref 4.5–11.5)

## 2022-10-16 PROCEDURE — 2580000003 HC RX 258: Performed by: INTERNAL MEDICINE

## 2022-10-16 PROCEDURE — 6360000002 HC RX W HCPCS: Performed by: FAMILY MEDICINE

## 2022-10-16 PROCEDURE — 6360000002 HC RX W HCPCS: Performed by: INTERNAL MEDICINE

## 2022-10-16 PROCEDURE — 2060000000 HC ICU INTERMEDIATE R&B

## 2022-10-16 PROCEDURE — 94640 AIRWAY INHALATION TREATMENT: CPT

## 2022-10-16 PROCEDURE — 6370000000 HC RX 637 (ALT 250 FOR IP): Performed by: INTERNAL MEDICINE

## 2022-10-16 PROCEDURE — 2580000003 HC RX 258: Performed by: FAMILY MEDICINE

## 2022-10-16 PROCEDURE — 80048 BASIC METABOLIC PNL TOTAL CA: CPT

## 2022-10-16 PROCEDURE — 2580000003 HC RX 258: Performed by: NURSE PRACTITIONER

## 2022-10-16 PROCEDURE — 2700000000 HC OXYGEN THERAPY PER DAY

## 2022-10-16 PROCEDURE — 80202 ASSAY OF VANCOMYCIN: CPT

## 2022-10-16 PROCEDURE — 6360000002 HC RX W HCPCS: Performed by: NURSE PRACTITIONER

## 2022-10-16 PROCEDURE — 85025 COMPLETE CBC W/AUTO DIFF WBC: CPT

## 2022-10-16 PROCEDURE — 6370000000 HC RX 637 (ALT 250 FOR IP): Performed by: FAMILY MEDICINE

## 2022-10-16 PROCEDURE — 94660 CPAP INITIATION&MGMT: CPT

## 2022-10-16 PROCEDURE — 6370000000 HC RX 637 (ALT 250 FOR IP): Performed by: NURSE PRACTITIONER

## 2022-10-16 PROCEDURE — 36415 COLL VENOUS BLD VENIPUNCTURE: CPT

## 2022-10-16 PROCEDURE — 99233 SBSQ HOSP IP/OBS HIGH 50: CPT | Performed by: INTERNAL MEDICINE

## 2022-10-16 PROCEDURE — 6370000000 HC RX 637 (ALT 250 FOR IP): Performed by: CLINICAL NURSE SPECIALIST

## 2022-10-16 PROCEDURE — 82962 GLUCOSE BLOOD TEST: CPT

## 2022-10-16 RX ADMIN — WATER 1000 MG: 1 INJECTION INTRAMUSCULAR; INTRAVENOUS; SUBCUTANEOUS at 23:28

## 2022-10-16 RX ADMIN — APIXABAN 5 MG: 5 TABLET, FILM COATED ORAL at 07:55

## 2022-10-16 RX ADMIN — AZITHROMYCIN DIHYDRATE 500 MG: 500 INJECTION, POWDER, LYOPHILIZED, FOR SOLUTION INTRAVENOUS at 23:39

## 2022-10-16 RX ADMIN — IPRATROPIUM BROMIDE AND ALBUTEROL SULFATE 1 AMPULE: .5; 2.5 SOLUTION RESPIRATORY (INHALATION) at 09:43

## 2022-10-16 RX ADMIN — ARFORMOTEROL TARTRATE 15 MCG: 15 SOLUTION RESPIRATORY (INHALATION) at 18:53

## 2022-10-16 RX ADMIN — CARBIDOPA AND LEVODOPA 2 TABLET: 25; 100 TABLET, EXTENDED RELEASE ORAL at 20:15

## 2022-10-16 RX ADMIN — CARBIDOPA AND LEVODOPA 2 TABLET: 25; 100 TABLET, EXTENDED RELEASE ORAL at 07:55

## 2022-10-16 RX ADMIN — ROPINIROLE HYDROCHLORIDE 1 MG: 1 TABLET, FILM COATED ORAL at 20:16

## 2022-10-16 RX ADMIN — ATORVASTATIN CALCIUM 40 MG: 40 TABLET, FILM COATED ORAL at 20:16

## 2022-10-16 RX ADMIN — SUCRALFATE 1 G: 1 TABLET ORAL at 07:55

## 2022-10-16 RX ADMIN — ROPINIROLE HYDROCHLORIDE 1 MG: 1 TABLET, FILM COATED ORAL at 07:55

## 2022-10-16 RX ADMIN — PIPERACILLIN AND TAZOBACTAM 3375 MG: 3; .375 INJECTION, POWDER, FOR SOLUTION INTRAVENOUS at 00:02

## 2022-10-16 RX ADMIN — INSULIN GLARGINE 13 UNITS: 100 INJECTION, SOLUTION SUBCUTANEOUS at 08:03

## 2022-10-16 RX ADMIN — PIPERACILLIN AND TAZOBACTAM 3375 MG: 3; .375 INJECTION, POWDER, FOR SOLUTION INTRAVENOUS at 16:28

## 2022-10-16 RX ADMIN — PIPERACILLIN AND TAZOBACTAM 3375 MG: 3; .375 INJECTION, POWDER, FOR SOLUTION INTRAVENOUS at 08:03

## 2022-10-16 RX ADMIN — METOPROLOL SUCCINATE 100 MG: 50 TABLET, EXTENDED RELEASE ORAL at 07:55

## 2022-10-16 RX ADMIN — IPRATROPIUM BROMIDE AND ALBUTEROL SULFATE 1 AMPULE: .5; 2.5 SOLUTION RESPIRATORY (INHALATION) at 14:15

## 2022-10-16 RX ADMIN — LORAZEPAM 0.5 MG: 0.5 TABLET ORAL at 00:02

## 2022-10-16 RX ADMIN — SUCRALFATE 1 G: 1 TABLET ORAL at 20:15

## 2022-10-16 RX ADMIN — LORAZEPAM 0.5 MG: 0.5 TABLET ORAL at 23:28

## 2022-10-16 RX ADMIN — FUROSEMIDE 40 MG: 40 TABLET ORAL at 07:55

## 2022-10-16 RX ADMIN — VANCOMYCIN HYDROCHLORIDE 750 MG: 10 INJECTION, POWDER, LYOPHILIZED, FOR SOLUTION INTRAVENOUS at 15:03

## 2022-10-16 RX ADMIN — Medication 10 ML: at 20:17

## 2022-10-16 RX ADMIN — ARFORMOTEROL TARTRATE 15 MCG: 15 SOLUTION RESPIRATORY (INHALATION) at 06:19

## 2022-10-16 RX ADMIN — SUCRALFATE 1 G: 1 TABLET ORAL at 14:59

## 2022-10-16 RX ADMIN — MIRTAZAPINE 15 MG: 15 TABLET, ORALLY DISINTEGRATING ORAL at 20:15

## 2022-10-16 RX ADMIN — Medication 10 ML: at 09:00

## 2022-10-16 RX ADMIN — INSULIN GLARGINE 13 UNITS: 100 INJECTION, SOLUTION SUBCUTANEOUS at 20:16

## 2022-10-16 RX ADMIN — INSULIN LISPRO 4 UNITS: 100 INJECTION, SOLUTION INTRAVENOUS; SUBCUTANEOUS at 08:03

## 2022-10-16 RX ADMIN — ROPINIROLE HYDROCHLORIDE 1 MG: 1 TABLET, FILM COATED ORAL at 14:59

## 2022-10-16 RX ADMIN — IPRATROPIUM BROMIDE AND ALBUTEROL SULFATE 1 AMPULE: .5; 2.5 SOLUTION RESPIRATORY (INHALATION) at 18:53

## 2022-10-16 RX ADMIN — CARBIDOPA AND LEVODOPA 2 TABLET: 25; 100 TABLET, EXTENDED RELEASE ORAL at 14:59

## 2022-10-16 RX ADMIN — APIXABAN 5 MG: 5 TABLET, FILM COATED ORAL at 20:16

## 2022-10-16 RX ADMIN — IPRATROPIUM BROMIDE AND ALBUTEROL SULFATE 1 AMPULE: .5; 2.5 SOLUTION RESPIRATORY (INHALATION) at 06:19

## 2022-10-16 RX ADMIN — METOPROLOL SUCCINATE 100 MG: 50 TABLET, EXTENDED RELEASE ORAL at 20:15

## 2022-10-16 RX ADMIN — PANTOPRAZOLE SODIUM 40 MG: 40 TABLET, DELAYED RELEASE ORAL at 07:55

## 2022-10-16 RX ADMIN — MONTELUKAST 10 MG: 10 TABLET, FILM COATED ORAL at 20:16

## 2022-10-16 RX ADMIN — PREDNISONE 20 MG: 20 TABLET ORAL at 07:55

## 2022-10-16 ASSESSMENT — PAIN SCALES - GENERAL
PAINLEVEL_OUTOF10: 0

## 2022-10-16 NOTE — PLAN OF CARE
Problem: Discharge Planning  Goal: Discharge to home or other facility with appropriate resources  10/16/2022 1518 by Yolis Barrera RN  Outcome: Progressing  10/16/2022 0119 by Bethel Hairston RN  Outcome: Progressing  Flowsheets (Taken 10/15/2022 2000)  Discharge to home or other facility with appropriate resources:   Identify barriers to discharge with patient and caregiver   Arrange for needed discharge resources and transportation as appropriate   Identify discharge learning needs (meds, wound care, etc)     Problem: Safety - Adult  Goal: Free from fall injury  10/16/2022 1518 by Yolis Barrera RN  Outcome: Progressing  10/16/2022 0119 by Bethel Hairston RN  Outcome: Progressing  Flowsheets (Taken 10/15/2022 2000)  Free From Fall Injury:   Instruct family/caregiver on patient safety   Based on caregiver fall risk screen, instruct family/caregiver to ask for assistance with transferring infant if caregiver noted to have fall risk factors     Problem: ABCDS Injury Assessment  Goal: Absence of physical injury  10/16/2022 1518 by oYlis Barrera RN  Outcome: Progressing  10/16/2022 0119 by Bethel Hairston RN  Outcome: Progressing  Flowsheets (Taken 10/15/2022 2000)  Absence of Physical Injury: Implement safety measures based on patient assessment     Problem: Skin/Tissue Integrity  Goal: Absence of new skin breakdown  Description: 1. Monitor for areas of redness and/or skin breakdown  2. Assess vascular access sites hourly  3. Every 4-6 hours minimum:  Change oxygen saturation probe site  4. Every 4-6 hours:  If on nasal continuous positive airway pressure, respiratory therapy assess nares and determine need for appliance change or resting period.   10/16/2022 1518 by Yolis Barrera RN  Outcome: Progressing  10/16/2022 0119 by Bethel Hairston RN  Outcome: Progressing     Problem: Chronic Conditions and Co-morbidities  Goal: Patient's chronic conditions and co-morbidity symptoms are monitored and maintained or improved  10/16/2022 1518 by Sonya Rangel RN  Outcome: Progressing  10/16/2022 0119 by Ermelinda Champion RN  Outcome: Progressing  Flowsheets (Taken 10/15/2022 2000)  Care Plan - Patient's Chronic Conditions and Co-Morbidity Symptoms are Monitored and Maintained or Improved:   Monitor and assess patient's chronic conditions and comorbid symptoms for stability, deterioration, or improvement   Collaborate with multidisciplinary team to address chronic and comorbid conditions and prevent exacerbation or deterioration   Update acute care plan with appropriate goals if chronic or comorbid symptoms are exacerbated and prevent overall improvement and discharge

## 2022-10-16 NOTE — PLAN OF CARE
Problem: Discharge Planning  Goal: Discharge to home or other facility with appropriate resources  Outcome: Progressing  Flowsheets (Taken 10/15/2022 2000)  Discharge to home or other facility with appropriate resources:   Identify barriers to discharge with patient and caregiver   Arrange for needed discharge resources and transportation as appropriate   Identify discharge learning needs (meds, wound care, etc)     Problem: Safety - Adult  Goal: Free from fall injury  Outcome: Progressing     Problem: ABCDS Injury Assessment  Goal: Absence of physical injury  Outcome: Progressing     Problem: Skin/Tissue Integrity  Goal: Absence of new skin breakdown  Description: 1. Monitor for areas of redness and/or skin breakdown  2. Assess vascular access sites hourly  3. Every 4-6 hours minimum:  Change oxygen saturation probe site  4. Every 4-6 hours:  If on nasal continuous positive airway pressure, respiratory therapy assess nares and determine need for appliance change or resting period.   Outcome: Progressing     Problem: Chronic Conditions and Co-morbidities  Goal: Patient's chronic conditions and co-morbidity symptoms are monitored and maintained or improved  Outcome: Progressing  Flowsheets (Taken 10/15/2022 2000)  Care Plan - Patient's Chronic Conditions and Co-Morbidity Symptoms are Monitored and Maintained or Improved:   Monitor and assess patient's chronic conditions and comorbid symptoms for stability, deterioration, or improvement   Collaborate with multidisciplinary team to address chronic and comorbid conditions and prevent exacerbation or deterioration   Update acute care plan with appropriate goals if chronic or comorbid symptoms are exacerbated and prevent overall improvement and discharge

## 2022-10-16 NOTE — PROGRESS NOTES
Patient is seen in follow-up for atrial fibrillation, CHF    Date of service: 10/16/2022    Subjective:   No new overnight cardiac complaints. No chest pain or palpitations. Respiratory status improved since admission per patient. Currently using BiPAP. AF at rate 70's-80's on telemetry. I/O's inaccurate (urine output more than documented).     ROS:  Cardiac: As per HPI  General: No fever, chills  Pulmonary: As per HPI  HEENT: No visual disturbances, difficult swallowing  GI: No nausea, vomiting  : No dysuria, hematuria  Endocrine: +hypothyroidism, +DM  Musculoskeletal: HDZ x 4, no focal motor deficits  Skin: Intact, no rashes  Neuro: No headache, seizures  Psych: Currently with no depression, anxiety    Scheduled Meds:   furosemide  40 mg Oral Daily    predniSONE  20 mg Oral Daily    piperacillin-tazobactam  3,375 mg IntraVENous Q8H    atorvastatin  40 mg Oral Nightly    vancomycin  1,250 mg IntraVENous Q24H    sodium chloride flush  5-40 mL IntraVENous 2 times per day    apixaban  5 mg Oral BID    carbidopa-levodopa  2 tablet Oral TID    insulin glargine  13 Units SubCUTAneous BID    metoprolol succinate  100 mg Oral BID    mirtazapine  15 mg Oral Nightly    montelukast  10 mg Oral Nightly    pantoprazole  40 mg Oral QAM    rOPINIRole  1 mg Oral TID    sucralfate  1 g Oral 4x Daily    insulin lispro  0-16 Units SubCUTAneous TID WC    insulin lispro  0-4 Units SubCUTAneous Nightly    Arformoterol Tartrate  15 mcg Nebulization BID    ipratropium-albuterol  1 ampule Inhalation Q4H    cefTRIAXone (ROCEPHIN) IV  1,000 mg IntraVENous Q24H    azithromycin  500 mg IntraVENous Q24H     Continuous Infusions:   dextrose      sodium chloride       PRN Meds:glucose, dextrose bolus **OR** dextrose bolus, glucagon (rDNA), dextrose, sodium chloride flush, sodium chloride, ondansetron **OR** ondansetron, polyethylene glycol, acetaminophen **OR** acetaminophen, docusate sodium, LORazepam, loperamide, promethazine    I/O last 3 completed shifts: In: 300 [P.O.:300]  Out: 500 [Urine:500]  No intake/output data recorded. Objective:      Physical Exam:   BP (!) 135/90   Pulse 82   Temp 98.2 °F (36.8 °C) (Oral)   Resp 18   Ht 5' (1.524 m)   Wt 211 lb (95.7 kg)   SpO2 97%   BMI 41.21 kg/m²   Appearance: Awake, alert and oriented x 3, no acute respiratory distress  Skin: Intact, no rash  Head: Normocephalic, atraumatic  Eyes: EOMI, no conjunctival erythema  ENMT: No pharyngeal erythema, MMM, no rhinorrhea, no dentures  Neck: Supple, no carotid bruits  Lungs: Decreased BS B/L, no wheezing  Cardiac: IRRR, no murmurs apparent  Abdomen: Soft, nontender, +bowel sounds  Extremities: Moves all extremities x 4, no lower extremity edema  Neurologic: No focal motor deficits apparent, normal mood and affect, alert and oriented x 3    Imaging  XR CHEST PORTABLE   Final Result   Right lower lobe opacity possibly representing atelectasis. CT CHEST W CONTRAST   Final Result   1. Interlobular septal thickening in the upper lungs, bilateral pleural   effusions greater on the right, 4 chamber cardiac enlargement, pericardial   effusion and hepatosplenomegaly. Trace right upper quadrant perihepatic   ascites. Findings suggest volume overload/CHF. 2.  Partial passive atelectasis of the lower lobes, right middle lobe and   lingular segment. 3.  Prominent main pulmonary artery, consider pulmonary artery hypertension. No filling defects in the pulmonary artery to suggest embolism. 4.  Postop changes in the right axilla and right breast.         XR CHEST (2 VW)   Final Result   Cardiomegaly with persistent interstitial and alveolar pulmonary edema which   is most pronounced on the right.              Lab Review   Lab Results   Component Value Date    WBC 7.4 10/14/2022    HGB 10.0 (L) 10/14/2022    HCT 34.8 10/14/2022    MCV 93.5 10/14/2022     10/14/2022     Lab Results   Component Value Date    CREATININE 1.1 (H) 10/14/2022    BUN 27 (H) 10/14/2022     10/14/2022    K 4.8 10/14/2022    CL 93 (L) 10/14/2022    CO2 38 (H) 10/14/2022     Lab Results   Component Value Date    TSH 8.350 (H) 09/27/2022     Lab Results   Component Value Date    LABA1C 6.1 (H) 08/24/2022     No results found for: EAG    Lab Results   Component Value Date    DIGOXIN 1.6 10/06/2022     Telemetry personally reviewed (10/16/2022): AF, rate 70's-80's    Cardiac catheterization: 1/11/22 (Dr. Per Benitez)  Left main: 0%  stenosis  LAD: 50 %  stenosis  Circumflex: 50 %   stenosis  RCA: Co dominant. 0 %  stenosis  LV angio: 75-80%  ejection fraction    Echocardiogram: 7/7/22 (Dr. Francesco Mullen)   Technically difficult study - limited visualization. Micro-bubble contrast injected to enhance left ventricular visualization. Normal left ventricular size and systolic function. Ejection fraction is visually estimated at 70-75%. Indeterminate diastolic function. No regional wall motion abnormalities seen. Mild left ventricular concentric hypertrophy noted. Moderately dilated right ventricle with reduced function. Biatrial dilation. Mild tricuspid regurgitation. RVSP is at least 60 mmHg. Prominent pericardial fat pad. No definitive evidence of pericardial effusion. Assessment:     1. Persistent atrial fibrillation -- multiple prior CVN's and recurrence of AF --> rate controlled  2. Acute on chronic hypoxic/hypercapnic respiratory failure  3. Acute on chronic diastolic congestive heart failure  4. Bilateral pleural effusion -- s/p right sided thoracentesis (900 cc) on 8/26/22  5. CAD: Nonobstructive involving LAD and left circumflex artery  7. Tricuspid valve regurgitation: Mild  8. Pulmonary hypertension  9. Hypertension: Controlled  10. Hyperlipidemia  11. Type 2 diabetes mellitus  12. Chronic anemia -- most recent Hgb 10.0  13.  Chronic OAC with eliquis  14, SERENA -- noncompliant with treatment as an outpatient; currently using BiPAP  15. Tobacco abuse / COPD  16.  BMI 45.4 --> 41.2     Recommendations:     Monitor BMP and I/O's with ongoing diuresis  Increased BB dose and switched to Toprol XL in 8/2022 --> current dose 100 mg BID (continue)  Continue eliquis 5 mg BID  Aggressive risk factor modifications / treatment of SERENA as indicated  Telemetry reviewed (rate controlled AF)  Care per pulmonary      Juanpablo Torres MD  [facility]) Cardiology

## 2022-10-16 NOTE — PROGRESS NOTES
Patient refusing the Bipap at this time. She was encouraged to wear it by both the RT and the RN but still refused.

## 2022-10-16 NOTE — PROGRESS NOTES
Pharmacy Consultation Note  (Antibiotic Dosing and Monitoring)    Initial consult date: 10/13/2022  Consulting physician/provider: Dr. Montero File  Drug: Vancomycin  Indication: \"respiratory\"    Age/  Gender Height Weight IBW  Allergy Information   71 y.o./female 5' (152.4 cm) 211 lb (95.7 kg)     Ideal body weight: 45.5 kg (100 lb 4.9 oz)  Adjusted ideal body weight: 65.6 kg (144 lb 9.4 oz)   Ciprofloxacin and Codeine      Renal Function:  Recent Labs     10/14/22  0801 10/16/22  0920   BUN 27* 29*   CREATININE 1.1* 0.9         Intake/Output Summary (Last 24 hours) at 10/16/2022 1145  Last data filed at 10/16/2022 0906  Gross per 24 hour   Intake 240 ml   Output --   Net 240 ml       Vancomycin Monitoring:  Trough:  No results for input(s): VANCOTROUGH in the last 72 hours. Random:    Recent Labs     10/14/22  0801 10/16/22  0920   VANCORANDOM <4.0* 11.3         No results for input(s): Randee Estrellaluis in the last 72 hours. Historical Cultures:  Organism   Date Value Ref Range Status   10/05/2022 Escherichia coli (A)  Final     No results for input(s): BC in the last 72 hours. Vancomycin Administration Times:  Recent vancomycin administrations                     vancomycin (VANCOCIN) 1,250 mg in dextrose 5 % 250 mL IVPB (mg) 1,250 mg New Bag 10/15/22 1420     1,250 mg New Bag 10/14/22 1453               Assessment:  Patient is a 66 y.o. female who has been initiated on vancomycin  Estimated Creatinine Clearance: 59 mL/min (based on SCr of 0.9 mg/dL). To dose vancomycin, pharmacy will be utilizing [employer] calculation software for goal AUC/DARELL 400-600 mg/L-hr  Vancomycin not given as ordered yesterday, unclear why.  Checked a random level today to confirm it was not administered which resulted as <4 mcg/mL  10/16: Random morning level = 11.3 mcg/mL; est trough 8.7 mcg/mL;     Plan:  Vancomycin 750 mg IV q12h  Repeat levels as appropriate  Will continue to monitor renal function   Pharmacy to follow    Rocio Ge, PharmD 10/16/2022 11:45 AM   [phone]

## 2022-10-16 NOTE — PROGRESS NOTES
Progress Note  Date:10/16/2022       Room:15/0515-02  Patient Name:Janneth Rivas     YOB: 1950     Age:71 y.o. Subjective    Subjective:  Symptoms:  Improved. She reports shortness of breath. Diet:  Adequate intake. No nausea. Activity level: Impaired due to weakness. Review of Systems   Constitutional:  Negative for fever. Respiratory:  Positive for shortness of breath. Gastrointestinal:  Negative for nausea. All other systems reviewed and are negative. Objective         Vitals Last 24 Hours:  TEMPERATURE:  Temp  Av °F (36.7 °C)  Min: 97.6 °F (36.4 °C)  Max: 98.3 °F (36.8 °C)  RESPIRATIONS RANGE: Resp  Av.6  Min: 14  Max: 19  PULSE OXIMETRY RANGE: SpO2  Av.3 %  Min: 97 %  Max: 100 %  PULSE RANGE: Pulse  Av.2  Min: 78  Max: 90  BLOOD PRESSURE RANGE: Systolic (49TWE), PTM:957 , Min:112 , LI   ; Diastolic (99BKY), RQW:88, Min:64, Max:90    I/O (24Hr): Intake/Output Summary (Last 24 hours) at 10/16/2022 1249  Last data filed at 10/16/2022 0906  Gross per 24 hour   Intake 240 ml   Output --   Net 240 ml     Objective:  General Appearance:  Ill-appearing. Vital signs: (most recent): Blood pressure (!) 113/56, pulse 83, temperature 97.7 °F (36.5 °C), temperature source Oral, resp. rate 19, height 5' (1.524 m), weight 211 lb (95.7 kg), SpO2 100 %, not currently breastfeeding. Vital signs are normal.  No fever. Output: Producing urine and producing stool. Lungs:  Normal effort and normal respiratory rate. There are decreased breath sounds. Heart: Irregular rhythm. Abdomen: Abdomen is soft. Extremities: Decreased range of motion. There is dependent edema. Neurological: Patient is alert and oriented to person, place and time. Skin:  Warm and dry.     Labs/Imaging/Diagnostics    Labs:  CBC:  Recent Labs     10/14/22  0801 10/16/22  0920   WBC 7.4 7.1   RBC 3.72 3.65   HGB 10.0* 9.9*   HCT 34.8 33.8*   MCV 93.5 92.6   RDW 15.6* 15.8*    247     CHEMISTRIES:  Recent Labs     10/14/22  0801 10/16/22  0920    139   K 4.8 4.3   CL 93* 93*   CO2 38* 39*   BUN 27* 29*   CREATININE 1.1* 0.9   GLUCOSE 334* 230*   MG 2.1  --      PT/INR:No results for input(s): PROTIME, INR in the last 72 hours. APTT:No results for input(s): APTT in the last 72 hours. LIVER PROFILE:  Recent Labs     10/14/22  0801   AST 7   ALT <5   BILITOT 0.3   ALKPHOS 98       Imaging Last 24 Hours:  No results found. Assessment//Plan           Hospital Problems             Last Modified POA    * (Principal) COPD exacerbation ([facility] [address] 10/12/2022 Yes    Acute on chronic respiratory failure with hypercapnia ([facility]) 10/13/2022 Yes    Persistent atrial fibrillation ([facility] [address] 10/13/2022 Yes     Assessment:    Condition: In stable condition. Improving.   ( Improving overall. Wound some shortness of breath). Plan:   Per physical therapy. Wean off oxygen. Consults: pulmonology. ( Continue diuresis   Continue tapering steroids   Continue aerosols   Will follow).      Electronically signed by Brayan Rojas DO on 10/16/22 at 12:49 PM EDT

## 2022-10-16 NOTE — PROGRESS NOTES
Pulmonary/Critical Care Progress Note    We are following patient for acute superimposed on chronic hypoxemic and hypercapnic respiratory failure, exacerbation of COPD, question right lower lobe pneumonia, alveolar hypoventilation, history of Parkinson's disease, obstructive sleep apnea, morbid obesity, diabetes mellitus type 2, atrial fibrillation, CHF, CKD    SUBJECTIVE:  Patient is feeling considerably better and is probably approaching maximum hospital benefit from the respiratory standpoint. Intravenous antibiotics can be continued an additional 24- 48 hours and we can decrease her steroids further.   Cardiology recommendations noted    MEDICATIONS:   vancomycin  750 mg IntraVENous Q12H    furosemide  40 mg Oral Daily    predniSONE  20 mg Oral Daily    piperacillin-tazobactam  3,375 mg IntraVENous Q8H    atorvastatin  40 mg Oral Nightly    sodium chloride flush  5-40 mL IntraVENous 2 times per day    apixaban  5 mg Oral BID    carbidopa-levodopa  2 tablet Oral TID    insulin glargine  13 Units SubCUTAneous BID    metoprolol succinate  100 mg Oral BID    mirtazapine  15 mg Oral Nightly    montelukast  10 mg Oral Nightly    pantoprazole  40 mg Oral QAM    rOPINIRole  1 mg Oral TID    sucralfate  1 g Oral 4x Daily    insulin lispro  0-16 Units SubCUTAneous TID WC    insulin lispro  0-4 Units SubCUTAneous Nightly    Arformoterol Tartrate  15 mcg Nebulization BID    ipratropium-albuterol  1 ampule Inhalation Q4H    cefTRIAXone (ROCEPHIN) IV  1,000 mg IntraVENous Q24H    azithromycin  500 mg IntraVENous Q24H      dextrose      sodium chloride       glucose, dextrose bolus **OR** dextrose bolus, glucagon (rDNA), dextrose, sodium chloride flush, sodium chloride, ondansetron **OR** ondansetron, polyethylene glycol, acetaminophen **OR** acetaminophen, docusate sodium, LORazepam, loperamide, promethazine      REVIEW OF SYSTEMS:  Constitutional: Denies fever, weight loss, night sweats, and fatigue  Skin: Denies pigmentation, dark lesions, and rashes   HEENT: Denies hearing loss, tinnitus, ear drainage, epistaxis, sore throat, and hoarseness. Cardiovascular: Denies palpitations, chest pain, and chest pressure. Respiratory: Denies cough, dyspnea at rest, hemoptysis, apnea, and choking. Gastrointestinal: Denies nausea, vomiting, poor appetite, diarrhea, heartburn or reflux  Genitourinary: Denies dysuria, frequency, urgency or hematuria  Musculoskeletal: Denies myalgias, muscle weakness, and bone pain  Neurological: Denies dizziness, vertigo, headache, and focal weakness  Psychological: Denies anxiety and depression  Endocrine: Denies heat intolerance and cold intolerance  Hematopoietic/Lymphatic: Denies bleeding problems and blood transfusions    OBJECTIVE:  Vitals:    10/16/22 1500   BP: 133/78   Pulse: 88   Resp: 20   Temp: 98 °F (36.7 °C)   SpO2: 96%     FiO2 : 35 %  O2 Flow Rate (L/min): 4 L/min  O2 Device: Nasal cannula    PHYSICAL EXAM:  Constitutional: Fever, chills, diaphoresis  Skin: Skin rash, no skin breakdown  HEENT: Unremarkable  Neck: No JVD, lymphadenopathy, follow-up  Cardiovascular: S1, S2 irregular consistent with atrial fibrillation no murmurs or rubs at this time  Respiratory: Lear to auscultation bilaterally  Gastrointestinal: Soft, obese, nontender  Genitourinary: No CVA tenderness  Extremities: No clubbing, cyanosis, or edema  Neurological: Awake, alert, oriented x3. No evidence of focal motor or sensory deficits  Psychological: In good spirits.   Appropriate affect    LABS:  WBC   Date Value Ref Range Status   10/16/2022 7.1 4.5 - 11.5 E9/L Final   10/14/2022 7.4 4.5 - 11.5 E9/L Final   10/13/2022 5.4 4.5 - 11.5 E9/L Final     Hemoglobin   Date Value Ref Range Status   10/16/2022 9.9 (L) 11.5 - 15.5 g/dL Final   10/14/2022 10.0 (L) 11.5 - 15.5 g/dL Final   10/13/2022 10.0 (L) 11.5 - 15.5 g/dL Final     Hematocrit   Date Value Ref Range Status   10/16/2022 33.8 (L) 34.0 - 48.0 % Final   10/14/2022 34.8 34.0 - 48.0 % Final   10/13/2022 34.3 34.0 - 48.0 % Final     MCV   Date Value Ref Range Status   10/16/2022 92.6 80.0 - 99.9 fL Final   10/14/2022 93.5 80.0 - 99.9 fL Final   10/13/2022 91.0 80.0 - 99.9 fL Final     Platelets   Date Value Ref Range Status   10/16/2022 247 130 - 450 E9/L Final   10/14/2022 269 130 - 450 E9/L Final   10/13/2022 233 130 - 450 E9/L Final     Sodium   Date Value Ref Range Status   10/16/2022 139 132 - 146 mmol/L Final   10/14/2022 139 132 - 146 mmol/L Final   10/13/2022 141 132 - 146 mmol/L Final     Potassium   Date Value Ref Range Status   10/16/2022 4.3 3.5 - 5.0 mmol/L Final   10/14/2022 4.8 3.5 - 5.0 mmol/L Final   10/08/2022 4.0 3.5 - 5.0 mmol/L Final     Potassium reflex Magnesium   Date Value Ref Range Status   10/13/2022 4.2 3.5 - 5.0 mmol/L Final   10/12/2022 4.3 3.5 - 5.0 mmol/L Final   10/05/2022 4.2 3.5 - 5.0 mmol/L Final     Chloride   Date Value Ref Range Status   10/16/2022 93 (L) 98 - 107 mmol/L Final   10/14/2022 93 (L) 98 - 107 mmol/L Final   10/13/2022 99 98 - 107 mmol/L Final     CO2   Date Value Ref Range Status   10/16/2022 39 (H) 22 - 29 mmol/L Final   10/14/2022 38 (H) 22 - 29 mmol/L Final   10/13/2022 32 (H) 22 - 29 mmol/L Final     BUN   Date Value Ref Range Status   10/16/2022 29 (H) 6 - 23 mg/dL Final   10/14/2022 27 (H) 6 - 23 mg/dL Final   10/13/2022 19 6 - 23 mg/dL Final     Creatinine   Date Value Ref Range Status   10/16/2022 0.9 0.5 - 1.0 mg/dL Final   10/14/2022 1.1 (H) 0.5 - 1.0 mg/dL Final   10/13/2022 0.9 0.5 - 1.0 mg/dL Final     Glucose   Date Value Ref Range Status   10/16/2022 230 (H) 74 - 99 mg/dL Final   10/14/2022 334 (H) 74 - 99 mg/dL Final   10/13/2022 215 (H) 74 - 99 mg/dL Final     Calcium   Date Value Ref Range Status   10/16/2022 8.9 8.6 - 10.2 mg/dL Final   10/14/2022 8.8 8.6 - 10.2 mg/dL Final   10/13/2022 8.1 (L) 8.6 - 10.2 mg/dL Final     Total Protein   Date Value Ref Range Status   10/14/2022 6.3 (L) 6.4 - 8.3 g/dL Final 10/13/2022 5.6 (L) 6.4 - 8.3 g/dL Final   10/08/2022 4.8 (L) 6.4 - 8.3 g/dL Final     Albumin   Date Value Ref Range Status   10/14/2022 3.5 3.5 - 5.2 g/dL Final   10/13/2022 3.1 (L) 3.5 - 5.2 g/dL Final   10/08/2022 2.5 (L) 3.5 - 5.2 g/dL Final     Total Bilirubin   Date Value Ref Range Status   10/14/2022 0.3 0.0 - 1.2 mg/dL Final   10/13/2022 0.3 0.0 - 1.2 mg/dL Final   10/08/2022 0.4 0.0 - 1.2 mg/dL Final     Alkaline Phosphatase   Date Value Ref Range Status   10/14/2022 98 35 - 104 U/L Final   10/13/2022 87 35 - 104 U/L Final   10/08/2022 74 35 - 104 U/L Final     AST   Date Value Ref Range Status   10/14/2022 7 0 - 31 U/L Final   10/13/2022 12 0 - 31 U/L Final     Comment:     Specimen is slightly Hemolyzed. Result may be artificially increased. 10/08/2022 7 0 - 31 U/L Final     ALT   Date Value Ref Range Status   10/14/2022 <5 0 - 32 U/L Final   10/13/2022 <5 0 - 32 U/L Final   10/08/2022 5 0 - 32 U/L Final     GFR Non-   Date Value Ref Range Status   10/16/2022 >60 >=60 mL/min/1.73 Final     Comment:     Chronic Kidney Disease: less than 60 ml/min/1.73 sq.m. Kidney Failure: less than 15 ml/min/1.73 sq.m. Results valid for patients 18 years and older. 10/14/2022 49 >=60 mL/min/1.73 Final     Comment:     Chronic Kidney Disease: less than 60 ml/min/1.73 sq.m. Kidney Failure: less than 15 ml/min/1.73 sq.m. Results valid for patients 18 years and older. 10/13/2022 >60 >=60 mL/min/1.73 Final     Comment:     Chronic Kidney Disease: less than 60 ml/min/1.73 sq.m. Kidney Failure: less than 15 ml/min/1.73 sq.m. Results valid for patients 18 years and older.        GFR    Date Value Ref Range Status   10/16/2022 >60  Final   10/14/2022 59  Final   10/13/2022 >60  Final     Magnesium   Date Value Ref Range Status   10/14/2022 2.1 1.6 - 2.6 mg/dL Final   10/08/2022 1.9 1.6 - 2.6 mg/dL Final   10/07/2022 1.8 1.6 - 2.6 mg/dL Final     Phosphorus Date Value Ref Range Status   10/08/2022 3.0 2.5 - 4.5 mg/dL Final   10/07/2022 2.8 2.5 - 4.5 mg/dL Final   10/06/2022 2.6 2.5 - 4.5 mg/dL Final     No results for input(s): PH, PO2, PCO2, HCO3, BE, O2SAT in the last 72 hours. RADIOLOGY:  XR CHEST PORTABLE   Final Result   Right lower lobe opacity possibly representing atelectasis. CT CHEST W CONTRAST   Final Result   1. Interlobular septal thickening in the upper lungs, bilateral pleural   effusions greater on the right, 4 chamber cardiac enlargement, pericardial   effusion and hepatosplenomegaly. Trace right upper quadrant perihepatic   ascites. Findings suggest volume overload/CHF. 2.  Partial passive atelectasis of the lower lobes, right middle lobe and   lingular segment. 3.  Prominent main pulmonary artery, consider pulmonary artery hypertension. No filling defects in the pulmonary artery to suggest embolism. 4.  Postop changes in the right axilla and right breast.         XR CHEST (2 VW)   Final Result   Cardiomegaly with persistent interstitial and alveolar pulmonary edema which   is most pronounced on the right. PROBLEM LIST:  Principal Problem:    COPD exacerbation ([address]  Active Problems:    Acute on chronic respiratory failure with hypercapnia (HCC)    Persistent atrial fibrillation (HCC)  Resolved Problems:    * No resolved hospital problems.  *      IMPRESSION:  Acute, superimposed on chronic hypoxemic and hypercapnic respiratory failure  Morbid obesity  Obstructive sleep apnea  HFpEF  Diabetes mellitus type 2  Possible right lower lobe pneumonia  Severe pulmonary hypertension  Severe COPD with exacerbation  Cor pulmonale  Obesity hypoventilation syndrome    PLAN:  Continue IV antibiotics for 24 to 48 hours then discontinue  Decrease prednisone  Continue bronchopulmonary hygiene  Continue inhaled bronchodilators and inhaled steroids  Check chest x-ray        Electronically signed by Debbie Ramsey MD on 10/16/2022 at 4:58 PM

## 2022-10-16 NOTE — PROGRESS NOTES
Patient refusing BiPAP at this time. Educated patient about the importance of wearing BiPAP. Patient still refusing. Adequate: hears normal conversation without difficulty

## 2022-10-17 ENCOUNTER — APPOINTMENT (OUTPATIENT)
Dept: GENERAL RADIOLOGY | Age: 72
DRG: 291 | End: 2022-10-17
Payer: MEDICARE

## 2022-10-17 LAB
BLOOD CULTURE, ROUTINE: NORMAL
CULTURE, BLOOD 2: NORMAL
METER GLUCOSE: 214 MG/DL (ref 74–99)
METER GLUCOSE: 218 MG/DL (ref 74–99)
METER GLUCOSE: 283 MG/DL (ref 74–99)
METER GLUCOSE: 397 MG/DL (ref 74–99)
VANCOMYCIN RANDOM: 19.9 MCG/ML (ref 5–40)

## 2022-10-17 PROCEDURE — 36415 COLL VENOUS BLD VENIPUNCTURE: CPT

## 2022-10-17 PROCEDURE — 2580000003 HC RX 258: Performed by: FAMILY MEDICINE

## 2022-10-17 PROCEDURE — 89051 BODY FLUID CELL COUNT: CPT

## 2022-10-17 PROCEDURE — 2060000000 HC ICU INTERMEDIATE R&B

## 2022-10-17 PROCEDURE — 6360000002 HC RX W HCPCS: Performed by: INTERNAL MEDICINE

## 2022-10-17 PROCEDURE — 71045 X-RAY EXAM CHEST 1 VIEW: CPT

## 2022-10-17 PROCEDURE — 6370000000 HC RX 637 (ALT 250 FOR IP): Performed by: FAMILY MEDICINE

## 2022-10-17 PROCEDURE — 94640 AIRWAY INHALATION TREATMENT: CPT

## 2022-10-17 PROCEDURE — 97530 THERAPEUTIC ACTIVITIES: CPT

## 2022-10-17 PROCEDURE — 94660 CPAP INITIATION&MGMT: CPT

## 2022-10-17 PROCEDURE — 6370000000 HC RX 637 (ALT 250 FOR IP): Performed by: NURSE PRACTITIONER

## 2022-10-17 PROCEDURE — 6370000000 HC RX 637 (ALT 250 FOR IP): Performed by: INTERNAL MEDICINE

## 2022-10-17 PROCEDURE — 80202 ASSAY OF VANCOMYCIN: CPT

## 2022-10-17 PROCEDURE — 97110 THERAPEUTIC EXERCISES: CPT

## 2022-10-17 PROCEDURE — 2700000000 HC OXYGEN THERAPY PER DAY

## 2022-10-17 PROCEDURE — 6370000000 HC RX 637 (ALT 250 FOR IP): Performed by: CLINICAL NURSE SPECIALIST

## 2022-10-17 PROCEDURE — 2580000003 HC RX 258: Performed by: INTERNAL MEDICINE

## 2022-10-17 PROCEDURE — 6360000002 HC RX W HCPCS: Performed by: FAMILY MEDICINE

## 2022-10-17 PROCEDURE — 82962 GLUCOSE BLOOD TEST: CPT

## 2022-10-17 PROCEDURE — 92526 ORAL FUNCTION THERAPY: CPT | Performed by: SPEECH-LANGUAGE PATHOLOGIST

## 2022-10-17 RX ADMIN — ROPINIROLE HYDROCHLORIDE 1 MG: 1 TABLET, FILM COATED ORAL at 20:52

## 2022-10-17 RX ADMIN — Medication 10 ML: at 20:58

## 2022-10-17 RX ADMIN — SUCRALFATE 1 G: 1 TABLET ORAL at 13:25

## 2022-10-17 RX ADMIN — INSULIN GLARGINE 13 UNITS: 100 INJECTION, SOLUTION SUBCUTANEOUS at 08:46

## 2022-10-17 RX ADMIN — Medication 10 ML: at 08:32

## 2022-10-17 RX ADMIN — PREDNISONE 15 MG: 5 TABLET ORAL at 08:38

## 2022-10-17 RX ADMIN — LORAZEPAM 0.5 MG: 0.5 TABLET ORAL at 23:35

## 2022-10-17 RX ADMIN — VANCOMYCIN HYDROCHLORIDE 750 MG: 10 INJECTION, POWDER, LYOPHILIZED, FOR SOLUTION INTRAVENOUS at 04:48

## 2022-10-17 RX ADMIN — ATORVASTATIN CALCIUM 40 MG: 40 TABLET, FILM COATED ORAL at 20:52

## 2022-10-17 RX ADMIN — ROPINIROLE HYDROCHLORIDE 1 MG: 1 TABLET, FILM COATED ORAL at 13:28

## 2022-10-17 RX ADMIN — INSULIN LISPRO 4 UNITS: 100 INJECTION, SOLUTION INTRAVENOUS; SUBCUTANEOUS at 08:45

## 2022-10-17 RX ADMIN — ROPINIROLE HYDROCHLORIDE 1 MG: 1 TABLET, FILM COATED ORAL at 08:38

## 2022-10-17 RX ADMIN — INSULIN LISPRO 4 UNITS: 100 INJECTION, SOLUTION INTRAVENOUS; SUBCUTANEOUS at 11:26

## 2022-10-17 RX ADMIN — CARBIDOPA AND LEVODOPA 2 TABLET: 25; 100 TABLET, EXTENDED RELEASE ORAL at 13:25

## 2022-10-17 RX ADMIN — SUCRALFATE 1 G: 1 TABLET ORAL at 20:52

## 2022-10-17 RX ADMIN — SUCRALFATE 1 G: 1 TABLET ORAL at 17:52

## 2022-10-17 RX ADMIN — SUCRALFATE 1 G: 1 TABLET ORAL at 08:39

## 2022-10-17 RX ADMIN — INSULIN GLARGINE 13 UNITS: 100 INJECTION, SOLUTION SUBCUTANEOUS at 20:52

## 2022-10-17 RX ADMIN — METOPROLOL SUCCINATE 100 MG: 50 TABLET, EXTENDED RELEASE ORAL at 20:52

## 2022-10-17 RX ADMIN — CARBIDOPA AND LEVODOPA 2 TABLET: 25; 100 TABLET, EXTENDED RELEASE ORAL at 20:52

## 2022-10-17 RX ADMIN — IPRATROPIUM BROMIDE AND ALBUTEROL SULFATE 1 AMPULE: .5; 2.5 SOLUTION RESPIRATORY (INHALATION) at 06:13

## 2022-10-17 RX ADMIN — FUROSEMIDE 40 MG: 40 TABLET ORAL at 08:39

## 2022-10-17 RX ADMIN — MONTELUKAST 10 MG: 10 TABLET, FILM COATED ORAL at 20:52

## 2022-10-17 RX ADMIN — METOPROLOL SUCCINATE 100 MG: 50 TABLET, EXTENDED RELEASE ORAL at 08:38

## 2022-10-17 RX ADMIN — IPRATROPIUM BROMIDE AND ALBUTEROL SULFATE 1 AMPULE: .5; 2.5 SOLUTION RESPIRATORY (INHALATION) at 13:03

## 2022-10-17 RX ADMIN — VANCOMYCIN HYDROCHLORIDE 750 MG: 10 INJECTION, POWDER, LYOPHILIZED, FOR SOLUTION INTRAVENOUS at 15:22

## 2022-10-17 RX ADMIN — PANTOPRAZOLE SODIUM 40 MG: 40 TABLET, DELAYED RELEASE ORAL at 08:39

## 2022-10-17 RX ADMIN — APIXABAN 5 MG: 5 TABLET, FILM COATED ORAL at 08:39

## 2022-10-17 RX ADMIN — PIPERACILLIN AND TAZOBACTAM 3375 MG: 3; .375 INJECTION, POWDER, FOR SOLUTION INTRAVENOUS at 09:10

## 2022-10-17 RX ADMIN — MIRTAZAPINE 15 MG: 15 TABLET, ORALLY DISINTEGRATING ORAL at 20:52

## 2022-10-17 RX ADMIN — ACETAMINOPHEN 650 MG: 325 TABLET ORAL at 08:38

## 2022-10-17 RX ADMIN — PIPERACILLIN AND TAZOBACTAM 3375 MG: 3; .375 INJECTION, POWDER, FOR SOLUTION INTRAVENOUS at 00:44

## 2022-10-17 RX ADMIN — IPRATROPIUM BROMIDE AND ALBUTEROL SULFATE 1 AMPULE: .5; 2.5 SOLUTION RESPIRATORY (INHALATION) at 18:22

## 2022-10-17 RX ADMIN — CARBIDOPA AND LEVODOPA 2 TABLET: 25; 100 TABLET, EXTENDED RELEASE ORAL at 08:38

## 2022-10-17 RX ADMIN — ARFORMOTEROL TARTRATE 15 MCG: 15 SOLUTION RESPIRATORY (INHALATION) at 06:13

## 2022-10-17 ASSESSMENT — ENCOUNTER SYMPTOMS
SHORTNESS OF BREATH: 1
NAUSEA: 0

## 2022-10-17 ASSESSMENT — PAIN SCALES - GENERAL
PAINLEVEL_OUTOF10: 0
PAINLEVEL_OUTOF10: 0
PAINLEVEL_OUTOF10: 2
PAINLEVEL_OUTOF10: 0
PAINLEVEL_OUTOF10: 6

## 2022-10-17 ASSESSMENT — PAIN DESCRIPTION - DESCRIPTORS: DESCRIPTORS: ACHING;DISCOMFORT

## 2022-10-17 ASSESSMENT — PAIN DESCRIPTION - ORIENTATION: ORIENTATION: MID

## 2022-10-17 ASSESSMENT — PAIN DESCRIPTION - LOCATION: LOCATION: HEAD

## 2022-10-17 ASSESSMENT — PAIN - FUNCTIONAL ASSESSMENT: PAIN_FUNCTIONAL_ASSESSMENT: ACTIVITIES ARE NOT PREVENTED

## 2022-10-17 ASSESSMENT — PAIN DESCRIPTION - PAIN TYPE: TYPE: ACUTE PAIN

## 2022-10-17 NOTE — PROGRESS NOTES
Physical Therapy Treatment Note/Plan of Care    Room #:  2460/4828-91  Patient Name: Donell Paredes  YOB: 1950  MRN: [id number]    Date of Service: 10/17/2022     Tentative placement recommendation: [facility] with Physical Therapy   Equipment recommendation: Equipment at Nursing Home      Evaluating Physical Therapist: Steven Azevedo      Specific Provider Orders/Date/Referring Provider :  10/12/22 2130    PT evaluation and treat  Start:  10/12/22 2130,   End:  10/12/22 2130,   ONE TIME,   Standing Count:  1 Occurrences,   R         Nidia Dobson DO     Admitting Diagnosis:   COPD exacerbation ([address] [J44.1]  Acute on chronic respiratory failure with hypercapnia ([address] [J96.22]  Acute on chronic congestive heart failure, unspecified heart failure type ([address] [I50.9]     shortness of breath  Surgery: none  Visit Diagnoses         Codes    Acute on chronic respiratory failure with hypercapnia ([address]    -  Primary J96.22            Patient Active Problem List   Diagnosis    Depression with anxiety    Osteoporosis    Asthma    Hyperlipidemia    Mitral regurgitation    Obstructive sleep apnea syndrome    Psoriasis    Diabetes mellitus ([address]    Parkinson's disease ([address]    Primary hypertension    Microalbuminuria    Morbid obesity ([address]    RLS (restless legs syndrome)    Generalized weakness    Inability to walk    Hypothyroidism    Chest pain    Acute asthma exacerbation    Asthma exacerbation, mild    Paroxysmal atrial fibrillation (HCC)    Atrial fibrillation with RVR ([address]    Acute on chronic congestive heart failure ([address]    Coronary artery disease involving native coronary artery of native heart without angina pectoris    Dysphagia    Hepatosplenomegaly    Acute decompensated heart failure (HCC)    Acute diastolic (congestive) heart failure (HCC)    Nonrheumatic tricuspid valve regurgitation    Tobacco abuse    Recurrent syncope    Septic shock ([address]    Seizure-like activity ([address]    SHANTANU (acute kidney injury) ([facility]    Pancytopenia (HCC)    Thrombocytopenia (HCC)    Encephalopathy    Acute respiratory failure with hypoxia (HCC)    Lactic acidosis    Delirium    Acute on chronic anemia    Pleural effusion, right    Acute respiratory failure with hypoxia and hypercapnia (HCC)    Acute confusion    Chronic diastolic (congestive) heart failure (HCC)    Macrocytosis    Other specified anemias    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[address]    Puerperal sepsis with acute hypoxic respiratory failure without septic shock (HCC)    Pulmonary HTN (HCC)    Chronic anticoagulation    RVF (right ventricular failure) (HCC)    Metabolic alkalosis    Sepsis (HCC)    COPD exacerbation (HCC)    Acute on chronic respiratory failure with hypercapnia (HCC)    Persistent atrial fibrillation (HCC)        ASSESSMENT of Current Deficits Patient exhibits decreased strength, balance, and endurance impairing functional mobility, transfers, gait , gait distance, and tolerance to activity Patient tolerates inc in ambulation today and education on strengthening LE due to weakness and shakiness from Parkinson's. Patient fatigues quickly, requiring rest breaks in between each activity. Patient requires continued skilled physical therapy to address concerns listed above for increased safety and function at discharge.          PHYSICAL THERAPY  PLAN OF CARE       Physical therapy plan of care is established based on physician order,  patient diagnosis and clinical assessment    Current Treatment Recommendations:    -Bed Mobility: Lower extremity exercises   -Sitting Balance: Incorporate reaching activities to activate trunk muscles   -Standing Balance: Perform strengthening exercises in standing to promote motor control with or without upper extremity support   -Transfers: Provide instruction on proper hand and foot position for adequate transfer of weight onto lower extremities and use of gait device if needed and Cues for hand placement, technique and safety. Provide stabilization to prevent fall   -Gait: Gait training, Standing activities to improve: base of support, weight shift, weight bearing , and Pregait training to emphasize: Sequencing , Device control, Upright, and Safety   -Endurance: Utilize Supervised activities to increase level of endurance to allow for safe functional mobility including transfers and gait     PT long term treatment goals are located in below grid    Patient and or family understand(s) diagnosis, prognosis, and plan of care. Frequency of treatments: Patient will be seen  daily.          Prior Level of Function: Patient ambulated with wheeled walker   short distance, transfers to wheelchair  Rehab Potential: good   for baseline    Past medical history:   Past Medical History:   Diagnosis Date    A-fib ([facility]     Acute on chronic congestive heart failure (HCC)     Anxiety     Asthma     CAD (coronary artery disease) 01/21/2016    Cancer ([facility]  breast ca 2006    right lumpectomy    Chronic kidney disease     nephrolithiasis    COPD exacerbation ([facility] 10/12/2022    Depression     Diabetes mellitus ([facility]     H/O mammogram     Hx MRSA infection     toe infection january 2012    Hyperlipidemia     Hypertension     Lateral epicondylitis     Morbid obesity ([address]     SERENA on CPAP     Oxygen dependent     Parkinson's disease ([address]     Tubal ligation status      Past Surgical History:   Procedure Laterality Date    BREAST LUMPECTOMY      BREAST REDUCTION SURGERY      CARDIAC CATHETERIZATION  4/28/2014    Dr. Garth Roque  01/11/2022    Dr. Vandana Horner  7/29/15    CT GUIDED CHEST TUBE  7/8/2022    CT GUIDED CHEST TUBE 7/8/2022 Kate Patel MD [facility]    ENDOSCOPY, COLON, DIAGNOSTIC  7/19/15    GALLBLADDER SURGERY      LUMBAR LAMINECTOMY      TOE AMPUTATION      TONSILLECTOMY      UPPER GASTROINTESTINAL ENDOSCOPY      UPPER GASTROINTESTINAL ENDOSCOPY N/A 7/19/2022    EGD ESOPHAGOGASTRODUODENOSCOPY performed by Collette Hobbs MD at [address]:    Precautions: Up as tolerated, falls ,   O2, Parkinson's disease    Imaging results: XR CHEST (2 VW)    Result Date: 10/12/2022  EXAMINATION: TWO XRAY VIEWS OF THE CHEST 10/12/2022 5:19 pm      Cardiomegaly with persistent interstitial and alveolar pulmonary edema which is most pronounced on the right. CT HEAD WO CONTRAST    Result Date: 10/9/2022  EXAMINATION: CT OF THE HEAD WITHOUT CONTRAST  10/9/2022 12:47 pm      No acute intracranial abnormality. CT ABDOMEN PELVIS W IV CONTRAST Additional Contrast? None    Result Date: 10/6/2022  EXAMINATION: CT OF THE ABDOMEN AND PELVIS WITH CONTRAST 10/6/2022 11:33 am    Subtle suggestion of hepatic steatosis and cirrhosis including portal venous hypertension with splenomegaly. Small volume abdominal ascites and mild anasarca. Bilateral lower lobe infiltrates and pleural effusions, right more than left. Pneumonia is suspected. Stable benign left adrenal adenoma. No imaging follow-up is required. Chronic left renal scarring probably due to remote infection. Small collection of gas within the endometrial cavity presumably iatrogenically introduced. No definite uterine pathology is appreciated. No focal uterine fluid collections. The uterus could be further evaluated sonographically if clinically indicated. Stool-filled colon particularly on the right suggesting constipation. XR CHEST PORTABLE    Result Date: 10/8/2022  EXAMINATION: ONE XRAY VIEW OF THE CHEST 10/8/2022 7:04 am      Redemonstration of interstitial pulmonary edema or pneumonia bilaterally appearing to have increased in right lung base.     Trenna Porcupine is most likely a confluence of pulmonary vessels. US DUP UPPER EXTREMITY LEFT VENOUS    Result Date: 10/8/2022  EXAMINATION: VENOUS ULTRASOUND OF THE LEFT UPPER EXTREMITY, 10/8/2022 6:54 pm      No evidence of DVT.  RECOMMENDATIONS: Unavailable 44e  Social history: Patient lives in a skilled nursing facility [facility] owned: Carissa Salas, Dave Champion, and [employer],       [address] [facility]   How much difficulty turning over in bed?: A Little  How much difficulty sitting down on / standing up from a chair with arms?: A Little  How much difficulty moving from lying on back to sitting on side of bed?: A Little  How much help from another person moving to and from a bed to a chair?: A Little  How much help from another person needed to walk in hospital room?: Total  How much help from another person for climbing 3-5 steps with a railing?: Total  AM-PAC Inpatient Mobility Raw Score : 14  AM-PAC Inpatient T-Scale Score : 38.1  Mobility Inpatient CMS 0-100% Score: 61.29  Mobility Inpatient CMS G-Code Modifier : CL    Nursing cleared patient for PT treatment. Patient sitting edge of bed at start of session. OBJECTIVE;   Initial Evaluation  Date: 10/13/2022 Treatment Date:    10/17/2022   Short Term/ Long Term   Goals   Was pt agreeable to Eval/treatment? Yes yes To be met in 3 days   Pain level   0/10    0/10    Bed Mobility    Rolling: Not assessed     Supine to sit: Minimal assist of 1    Sit to supine: Not assessed     Scooting: Minimal assist of 1   Rolling: Not assessed patient seated edge of bed   Supine to sit: Not assessed patient seated edge of bed   Sit to supine: Not assessed patient in chair   Scooting: Supervision     Rolling: Independent    Supine to sit:  Independent    Sit to supine: Independent    Scooting: Independent     Transfers Sit to stand: Minimal assist of 1 Cues for hand placement and safety  Sit to stand: Minimal assist of 1 cues for safety and hand placement x multiple reps   Sit to stand: Supervision      Ambulation     5 feet using  wheeled walker with Minimal assist of 1   for walker control and cues for sequencing, upright posture, and safety 2-3 steps, 2x8 feet using  wheeled walker with Minimal assist of 1   cues for upright posture, safety, pacing, and locking out bilateral LE     20 feet using  wheeled walker with Supervision     Stair negotiation: ascended and descended   Not assessed          ROM Within functional limits     Increase range of motion 10% of affected joints    Strength BUE:  refer to OT eval  RLE:  3+ 4-/5  LLE:  3+ 4-/5  Increase strength in affected mm groups by 1/3 grade   Balance Sitting EOB:  good -  Dynamic Standing:  fair wheeled walker  Sitting EOB: good   Dynamic Standing: fair +with wheeled walker   Sitting EOB:  good    Dynamic Standing: good -wheeled walker      Patient is Alert & Oriented x person, place, time, and situation and follows directions    Sensation:  Patient  reports numbness/tingling bilateral toes     Edema:  yes bilateral lower extremities   Endurance: fair       Vitals:  4 liters high flow nasal cannula   Blood Pressure at rest  Blood Pressure during session    Heart Rate at rest  Heart Rate during session    SPO2 at rest %  SPO2 during session %     Patient education  Patient educated on role of Physical Therapy, risks of immobility, safety and plan of care, energy conservation,  importance of mobility while in hospital , safety , and seated exercises      Patient response to education:   Pt verbalized understanding Pt demonstrated skill Pt requires further education in this area   Yes Partial Yes      Treatment:  Patient practiced and was instructed/facilitated in the following treatment: Patient assisted to standing and took steps to bedside commode. Patient assisted on/off commode, with hygiene, and doffing donning new depends. Patient stood to take steps to bedside chair. Patient took seated rest then stood to amb to window and back to bed. Patient performed seated exercises.              Therapeutic Exercises:  ankle pumps, long arc quad, seated marching, and manually resisted hamstring curls, manually resisted hip abduction   x 15-20 reps. At end of session, patient in chair with     call light and phone within reach,  all lines and tubes intact, nursing notified. Patient would benefit from continued skilled Physical Therapy to improve functional independence and quality of life.          Patient's/ family goals   Return to [facility]    Time in  937  Time out  1015    Total Treatment Time  38 minutes      CPT codes:  Therapeutic activities (70056)   23 minutes  2 unit(s)  Therapeutic exercises (10339)   15 minutes  1 unit(s)    Ginette Purcell PTA   #[id number]

## 2022-10-17 NOTE — PROGRESS NOTES
Dr. Levar Tijerina notified  via perfect serve of patient's dinner time glucose of 397. Patient received 16 units Humalog per algorithm. No new orders.

## 2022-10-17 NOTE — CARE COORDINATION
10-17-Cm note: ( Covid neg 10-12) pt plans on returning to [address] [place] [address], she is on her chronic 4L nc, spoke to liaison at [facility], she stated pt's dtr will need to tie up loose ends for return, spoke to dtr states \"they never return my calls\" , let liaison know this , will await outcome of whatever issues are impeding return , Luis Plummer is needed.  . Electronically signed by Brett Monroe RN on 10/17/2022 at 2:28 PM

## 2022-10-17 NOTE — PROGRESS NOTES
Physical Therapy        0515/0515-02    Patient unavailable for physical therapy treatment due to patient declining therapy at this time due to fatigue. Will attempt session at later time/date.       Lauren Orona, PTA  #[id number]

## 2022-10-17 NOTE — PLAN OF CARE
Problem: Discharge Planning  Goal: Discharge to home or other facility with appropriate resources  10/17/2022 0243 by Samuel Curiel RN  Outcome: Progressing  Flowsheets (Taken 10/16/2022 2000)  Discharge to home or other facility with appropriate resources:   Identify barriers to discharge with patient and caregiver   Arrange for needed discharge resources and transportation as appropriate   Identify discharge learning needs (meds, wound care, etc)  10/16/2022 1518 by Michelle Anand RN  Outcome: Progressing     Problem: Safety - Adult  Goal: Free from fall injury  10/17/2022 0243 by Samuel Curiel RN  Outcome: Progressing  Flowsheets (Taken 10/16/2022 2000)  Free From Fall Injury:   Instruct family/caregiver on patient safety   Based on caregiver fall risk screen, instruct family/caregiver to ask for assistance with transferring infant if caregiver noted to have fall risk factors  10/16/2022 1518 by Michelle Anand RN  Outcome: Progressing     Problem: ABCDS Injury Assessment  Goal: Absence of physical injury  10/17/2022 0243 by Samuel Curiel RN  Outcome: Progressing  Flowsheets (Taken 10/16/2022 2000)  Absence of Physical Injury: Implement safety measures based on patient assessment  10/16/2022 1518 by Michelle Anand RN  Outcome: Progressing     Problem: Skin/Tissue Integrity  Goal: Absence of new skin breakdown  Description: 1. Monitor for areas of redness and/or skin breakdown  2. Assess vascular access sites hourly  3. Every 4-6 hours minimum:  Change oxygen saturation probe site  4. Every 4-6 hours:  If on nasal continuous positive airway pressure, respiratory therapy assess nares and determine need for appliance change or resting period.   10/17/2022 0243 by Samuel Curiel RN  Outcome: Progressing  10/16/2022 1518 by Michelle Anand RN  Outcome: Progressing     Problem: Chronic Conditions and Co-morbidities  Goal: Patient's chronic conditions and co-morbidity symptoms are monitored and maintained or improved  10/17/2022 0243 by Bertrand Scott RN  Outcome: Progressing  Flowsheets (Taken 10/16/2022 2000)  Care Plan - Patient's Chronic Conditions and Co-Morbidity Symptoms are Monitored and Maintained or Improved:   Monitor and assess patient's chronic conditions and comorbid symptoms for stability, deterioration, or improvement   Collaborate with multidisciplinary team to address chronic and comorbid conditions and prevent exacerbation or deterioration   Update acute care plan with appropriate goals if chronic or comorbid symptoms are exacerbated and prevent overall improvement and discharge  10/16/2022 1518 by Grace Landau, RN  Outcome: Progressing

## 2022-10-17 NOTE — PROGRESS NOTES
Pharmacy Consultation Note  (Antibiotic Dosing and Monitoring)    Initial consult date: 10/13/2022  Consulting physician/provider: Dr. Anahy Pinto  Drug: Vancomycin  Indication: \"respiratory\"    Age/  Gender Height Weight IBW  Allergy Information   71 y.o./female 5' (152.4 cm) 211 lb (95.7 kg)     Ideal body weight: 45.5 kg (100 lb 4.9 oz)  Adjusted ideal body weight: 65.6 kg (144 lb 9.4 oz)   Ciprofloxacin and Codeine      Renal Function:  Recent Labs     10/16/22  0920   BUN 29*   CREATININE 0.9         Intake/Output Summary (Last 24 hours) at 10/17/2022 1335  Last data filed at 10/17/2022 1240  Gross per 24 hour   Intake 2722 ml   Output --   Net 2722 ml       Vancomycin Monitoring:  Trough:  No results for input(s): VANCOTROUGH in the last 72 hours. Random:    Recent Labs     10/16/22  0920 10/17/22  1150   VANCORANDOM 11.3 19.9         No results for input(s): Andrésluís Connor in the last 72 hours. Historical Cultures:  Organism   Date Value Ref Range Status   10/05/2022 Escherichia coli (A)  Final     No results for input(s): BC in the last 72 hours. Recent vancomycin administrations                     vancomycin (VANCOCIN) 750 mg in dextrose 5 % 250 mL IVPB (mg) 750 mg New Bag 10/17/22 0448     750 mg New Bag 10/16/22 1503    vancomycin (VANCOCIN) 1,250 mg in dextrose 5 % 250 mL IVPB (mg) 1,250 mg New Bag 10/15/22 1420     1,250 mg New Bag 10/14/22 1453             Assessment:  Patient is a 66 y.o. female who has been initiated on vancomycin  Estimated Creatinine Clearance: 59 mL/min (based on SCr of 0.9 mg/dL). To dose vancomycin, pharmacy will be utilizing [employer] calculation software for goal AUC/DARELL 400-600 mg/L-hr  Vancomycin not given as ordered yesterday, unclear why. Checked a random level today to confirm it was not administered which resulted as <4 mcg/mL  10/16: Random morning level = 11.3 mcg/mL; est trough 8.7 mcg/mL;   10/17: Random level @ 1150 = 19.9 mcg/mL.  Estimated trough of 15.1 mcg/mL. AUC/DARELL 577.     Plan:  Vancomycin 750 mg IV q12h  Repeat levels as appropriate  Will continue to monitor renal function   Pharmacy to follow    Delmy Carroll PharmD, BCPS 10/17/2022 1:37 PM   Ext: [id number]

## 2022-10-17 NOTE — PROGRESS NOTES
Pulmonary/Critical Care Progress Note    We are following patient for acute superimposed on chronic hypoxemic and hypercapnic respiratory failure, exacerbation of COPD, question right lower lobe pneumonia, alveolar hypoventilation, CKD, CHF, atrial fibrillation, diabetes mellitus type 2, morbid obesity, obstructive sleep apnea, history of Parkinson's disease      SUBJECTIVE:  Patient continues to improve. She is less short of breath. Heart rate is much better controlled. Continues to endorse exertional and conversational dyspnea. MEDICATIONS:   vancomycin  750 mg IntraVENous Q12H    predniSONE  15 mg Oral Daily    furosemide  40 mg Oral Daily    piperacillin-tazobactam  3,375 mg IntraVENous Q8H    atorvastatin  40 mg Oral Nightly    sodium chloride flush  5-40 mL IntraVENous 2 times per day    apixaban  5 mg Oral BID    carbidopa-levodopa  2 tablet Oral TID    insulin glargine  13 Units SubCUTAneous BID    metoprolol succinate  100 mg Oral BID    mirtazapine  15 mg Oral Nightly    montelukast  10 mg Oral Nightly    pantoprazole  40 mg Oral QAM    rOPINIRole  1 mg Oral TID    sucralfate  1 g Oral 4x Daily    insulin lispro  0-16 Units SubCUTAneous TID WC    insulin lispro  0-4 Units SubCUTAneous Nightly    Arformoterol Tartrate  15 mcg Nebulization BID    ipratropium-albuterol  1 ampule Inhalation Q4H      dextrose      sodium chloride       glucose, dextrose bolus **OR** dextrose bolus, glucagon (rDNA), dextrose, sodium chloride flush, sodium chloride, ondansetron **OR** ondansetron, polyethylene glycol, acetaminophen **OR** acetaminophen, docusate sodium, LORazepam, loperamide, promethazine      REVIEW OF SYSTEMS:  Constitutional: Denies fever, weight loss, night sweats, and fatigue  Skin: Denies pigmentation, dark lesions, and rashes   HEENT: Denies hearing loss, tinnitus, ear drainage, epistaxis, sore throat, and hoarseness. Cardiovascular: Denies palpitations, chest pain, and chest pressure.   Respiratory: Denies cough, dyspnea at rest, hemoptysis, apnea, and choking. Gastrointestinal: Denies nausea, vomiting, poor appetite, diarrhea, heartburn or reflux  Genitourinary: Denies dysuria, frequency, urgency or hematuria  Musculoskeletal: Denies myalgias, muscle weakness, and bone pain  Neurological: Denies dizziness, vertigo, headache, and focal weakness  Psychological: Denies anxiety and depression  Endocrine: Denies heat intolerance and cold intolerance  Hematopoietic/Lymphatic: Denies bleeding problems and blood transfusions    OBJECTIVE:  Vitals:    10/17/22 1500   BP: (!) 108/59   Pulse: 98   Resp: 16   Temp: 98.3 °F (36.8 °C)   SpO2: 100%     FiO2 : 35 %  O2 Flow Rate (L/min): 4 L/min  O2 Device: Nasal cannula    PHYSICAL EXAM:  Constitutional: No fever, chills, diaphoresis  Skin: No skin rash, no skin breakdown  HEENT: Unremarkable  Neck: No JVD, lymphadenopathy, thyromegaly  Cardiovascular: S1, S2 irregular. Grade 1/6 to 2/6 systolic ejection murmur left sternal border  Respiratory: Few crackles right base, minimal end expiratory wheezes bilaterally  Gastrointestinal: Soft, obese, nontender  Genitourinary: No CVA tenderness  Extremities:, No clubbing, cyanosis, or edema  Neurological: Awake, alert, oriented x3. No evidence of focal motor or sensory deficits  Psychological: In good spirits. Appropriate affect. Very pleased with her progress.     LABS:  WBC   Date Value Ref Range Status   10/16/2022 7.1 4.5 - 11.5 E9/L Final   10/14/2022 7.4 4.5 - 11.5 E9/L Final   10/13/2022 5.4 4.5 - 11.5 E9/L Final     Hemoglobin   Date Value Ref Range Status   10/16/2022 9.9 (L) 11.5 - 15.5 g/dL Final   10/14/2022 10.0 (L) 11.5 - 15.5 g/dL Final   10/13/2022 10.0 (L) 11.5 - 15.5 g/dL Final     Hematocrit   Date Value Ref Range Status   10/16/2022 33.8 (L) 34.0 - 48.0 % Final   10/14/2022 34.8 34.0 - 48.0 % Final   10/13/2022 34.3 34.0 - 48.0 % Final     MCV   Date Value Ref Range Status   10/16/2022 92.6 80.0 - 99.9 fL Final   10/14/2022 93.5 80.0 - 99.9 fL Final   10/13/2022 91.0 80.0 - 99.9 fL Final     Platelets   Date Value Ref Range Status   10/16/2022 247 130 - 450 E9/L Final   10/14/2022 269 130 - 450 E9/L Final   10/13/2022 233 130 - 450 E9/L Final     Sodium   Date Value Ref Range Status   10/16/2022 139 132 - 146 mmol/L Final   10/14/2022 139 132 - 146 mmol/L Final   10/13/2022 141 132 - 146 mmol/L Final     Potassium   Date Value Ref Range Status   10/16/2022 4.3 3.5 - 5.0 mmol/L Final   10/14/2022 4.8 3.5 - 5.0 mmol/L Final   10/08/2022 4.0 3.5 - 5.0 mmol/L Final     Potassium reflex Magnesium   Date Value Ref Range Status   10/13/2022 4.2 3.5 - 5.0 mmol/L Final   10/12/2022 4.3 3.5 - 5.0 mmol/L Final   10/05/2022 4.2 3.5 - 5.0 mmol/L Final     Chloride   Date Value Ref Range Status   10/16/2022 93 (L) 98 - 107 mmol/L Final   10/14/2022 93 (L) 98 - 107 mmol/L Final   10/13/2022 99 98 - 107 mmol/L Final     CO2   Date Value Ref Range Status   10/16/2022 39 (H) 22 - 29 mmol/L Final   10/14/2022 38 (H) 22 - 29 mmol/L Final   10/13/2022 32 (H) 22 - 29 mmol/L Final     BUN   Date Value Ref Range Status   10/16/2022 29 (H) 6 - 23 mg/dL Final   10/14/2022 27 (H) 6 - 23 mg/dL Final   10/13/2022 19 6 - 23 mg/dL Final     Creatinine   Date Value Ref Range Status   10/16/2022 0.9 0.5 - 1.0 mg/dL Final   10/14/2022 1.1 (H) 0.5 - 1.0 mg/dL Final   10/13/2022 0.9 0.5 - 1.0 mg/dL Final     Glucose   Date Value Ref Range Status   10/16/2022 230 (H) 74 - 99 mg/dL Final   10/14/2022 334 (H) 74 - 99 mg/dL Final   10/13/2022 215 (H) 74 - 99 mg/dL Final     Calcium   Date Value Ref Range Status   10/16/2022 8.9 8.6 - 10.2 mg/dL Final   10/14/2022 8.8 8.6 - 10.2 mg/dL Final   10/13/2022 8.1 (L) 8.6 - 10.2 mg/dL Final     Total Protein   Date Value Ref Range Status   10/14/2022 6.3 (L) 6.4 - 8.3 g/dL Final   10/13/2022 5.6 (L) 6.4 - 8.3 g/dL Final   10/08/2022 4.8 (L) 6.4 - 8.3 g/dL Final     Albumin   Date Value Ref Range Status 10/14/2022 3.5 3.5 - 5.2 g/dL Final   10/13/2022 3.1 (L) 3.5 - 5.2 g/dL Final   10/08/2022 2.5 (L) 3.5 - 5.2 g/dL Final     Total Bilirubin   Date Value Ref Range Status   10/14/2022 0.3 0.0 - 1.2 mg/dL Final   10/13/2022 0.3 0.0 - 1.2 mg/dL Final   10/08/2022 0.4 0.0 - 1.2 mg/dL Final     Alkaline Phosphatase   Date Value Ref Range Status   10/14/2022 98 35 - 104 U/L Final   10/13/2022 87 35 - 104 U/L Final   10/08/2022 74 35 - 104 U/L Final     AST   Date Value Ref Range Status   10/14/2022 7 0 - 31 U/L Final   10/13/2022 12 0 - 31 U/L Final     Comment:     Specimen is slightly Hemolyzed. Result may be artificially increased. 10/08/2022 7 0 - 31 U/L Final     ALT   Date Value Ref Range Status   10/14/2022 <5 0 - 32 U/L Final   10/13/2022 <5 0 - 32 U/L Final   10/08/2022 5 0 - 32 U/L Final     GFR Non-   Date Value Ref Range Status   10/16/2022 >60 >=60 mL/min/1.73 Final     Comment:     Chronic Kidney Disease: less than 60 ml/min/1.73 sq.m. Kidney Failure: less than 15 ml/min/1.73 sq.m. Results valid for patients 18 years and older. 10/14/2022 49 >=60 mL/min/1.73 Final     Comment:     Chronic Kidney Disease: less than 60 ml/min/1.73 sq.m. Kidney Failure: less than 15 ml/min/1.73 sq.m. Results valid for patients 18 years and older. 10/13/2022 >60 >=60 mL/min/1.73 Final     Comment:     Chronic Kidney Disease: less than 60 ml/min/1.73 sq.m. Kidney Failure: less than 15 ml/min/1.73 sq.m. Results valid for patients 18 years and older.        GFR    Date Value Ref Range Status   10/16/2022 >60  Final   10/14/2022 59  Final   10/13/2022 >60  Final     Magnesium   Date Value Ref Range Status   10/14/2022 2.1 1.6 - 2.6 mg/dL Final   10/08/2022 1.9 1.6 - 2.6 mg/dL Final   10/07/2022 1.8 1.6 - 2.6 mg/dL Final     Phosphorus   Date Value Ref Range Status   10/08/2022 3.0 2.5 - 4.5 mg/dL Final   10/07/2022 2.8 2.5 - 4.5 mg/dL Final   10/06/2022 2.6 2.5 - 4.5 mg/dL Final     No results for input(s): PH, PO2, PCO2, HCO3, BE, O2SAT in the last 72 hours. RADIOLOGY:  XR CHEST PORTABLE   Final Result   Worsening right lower lobe infiltrate and effusion         XR CHEST PORTABLE   Final Result   Right lower lobe opacity possibly representing atelectasis. CT CHEST W CONTRAST   Final Result   1. Interlobular septal thickening in the upper lungs, bilateral pleural   effusions greater on the right, 4 chamber cardiac enlargement, pericardial   effusion and hepatosplenomegaly. Trace right upper quadrant perihepatic   ascites. Findings suggest volume overload/CHF. 2.  Partial passive atelectasis of the lower lobes, right middle lobe and   lingular segment. 3.  Prominent main pulmonary artery, consider pulmonary artery hypertension. No filling defects in the pulmonary artery to suggest embolism. 4.  Postop changes in the right axilla and right breast.         XR CHEST (2 VW)   Final Result   Cardiomegaly with persistent interstitial and alveolar pulmonary edema which   is most pronounced on the right. PROBLEM LIST:  Principal Problem:    COPD exacerbation ([address]  Active Problems:    Acute on chronic respiratory failure with hypercapnia (HCC)    Persistent atrial fibrillation (HCC)  Resolved Problems:    * No resolved hospital problems.  *      IMPRESSION:  Acute, superimposed on chronic hypoxemic and hypercapnic respiratory failure  Morbid obesity  Obstructive sleep apnea  Alveolar hypoventilation syndrome  Diabetes mellitus type 2  HFpEF  Possible right lower lobe pneumonia  Severe pulmonary hypertension with an element of right heart failure  Atrial fibrillation on Eliquis with controlled ventricular response  Chronic kidney disease  Small bilateral pleural effusions  Severe COPD with exacerbation  Cor pulmonale    PLAN:  Discontinue antibiotics  BiPAP as needed and nightly  Continue inhaled bronchodilators   Chest x-ray 10/17/22 showed worsening right-sided pleural effusion  PT and OT are essential and will continue  Eliquis was put on hold. Thoracentesis was ordered. Patient had a thoracentesis in August 26, 2022.       Electronically signed by Leigh Dumont MD on 10/17/2022 at 3:24 PM

## 2022-10-17 NOTE — PROGRESS NOTES
Physician Progress Note      PATIENT:               Hanna Coffey  CSN #:                  [id number]  :                       1950  ADMIT DATE:       10/12/2022 4:06 PM  [address] [place] DATE:  [place]  PROVIDER #:        France Cabrera DO          QUERY TEXT:    Pt admitted with acute on chronic diastolic heart failure. Per pulmonary PN   noted 10/14 \"Possible right lower lobe pneumonia\". If possible, please   document in progress notes and discharge summary:    The medical record reflects the following:  Risk Factors: Acute on chronic diastolic congestive heart failure, Acute on   chronic hypoxic/hypercapnic respiratory failure, COPD exacerbation  Clinical Indicators: 10/14 Pulmonary PN state Possible right lower lobe   pneumonia, 10/15 Pulmonology PN state Possible right lower lobe pneumonia,   10/16 Pulmonary PN state Possible right lower lobe pneumonia, 10/14 Chest x   ray impression Right lower lobe opacity possibly representing atelectasis  Treatment: cefTRIAXone, azithromycin, piperacillin-tazobactam, vancomycin  Options provided:  -- Pneumonia was confirmed after study  -- [Pneumonia was ruled out  -- Other - I will add my own diagnosis  -- Disagree - Not applicable / Not valid  -- Disagree - Clinically unable to determine / Unknown  -- Refer to Clinical Documentation Reviewer    PROVIDER RESPONSE TEXT:    Pneumonia was ruled out after study.     Query created by: John Tobar on 10/17/2022 10:48 AM      Electronically signed by:  France Cabrera DO 10/17/2022 2:10 PM

## 2022-10-17 NOTE — PROGRESS NOTES
Labs/Imaging/Diagnostics    Labs:  CBC:  Recent Labs     10/14/22  0801 10/16/22  0920   WBC 7.4 7.1   RBC 3.72 3.65   HGB 10.0* 9.9*   HCT 34.8 33.8*   MCV 93.5 92.6   RDW 15.6* 15.8*    247     CHEMISTRIES:  Recent Labs     10/14/22  0801 10/16/22  0920    139   K 4.8 4.3   CL 93* 93*   CO2 38* 39*   BUN 27* 29*   CREATININE 1.1* 0.9   GLUCOSE 334* 230*   MG 2.1  --      PT/INR:No results for input(s): PROTIME, INR in the last 72 hours. APTT:No results for input(s): APTT in the last 72 hours. LIVER PROFILE:  Recent Labs     10/14/22  0801   AST 7   ALT <5   BILITOT 0.3   ALKPHOS 98       Imaging Last 24 Hours:  No results found. Assessment//Plan           Hospital Problems             Last Modified POA    * (Principal) COPD exacerbation ([facility] 10/12/2022 Yes    Acute on chronic respiratory failure with hypercapnia (HCC) 10/13/2022 Yes    Persistent atrial fibrillation ([address] 10/13/2022 Yes     Assessment:    Condition: In stable condition. Improving. (Doing well,  plan to discharge soon). Plan:   Other transfer (see comment). Per physical therapy. Consults: cardiology and pulmonology. Regular diet. (Continue current management  Will follow).      Electronically signed by Yunier Valverde DO on 10/17/22 at 7:35 AM EDT

## 2022-10-18 ENCOUNTER — APPOINTMENT (OUTPATIENT)
Dept: GENERAL RADIOLOGY | Age: 72
DRG: 291 | End: 2022-10-18
Payer: MEDICARE

## 2022-10-18 ENCOUNTER — HOSPITAL ENCOUNTER (OUTPATIENT)
Dept: OTHER | Age: 72
Setting detail: THERAPIES SERIES
Discharge: HOME | End: 2022-10-18

## 2022-10-18 ENCOUNTER — APPOINTMENT (OUTPATIENT)
Dept: INTERVENTIONAL RADIOLOGY/VASCULAR | Age: 72
DRG: 291 | End: 2022-10-18
Payer: MEDICARE

## 2022-10-18 LAB
APPEARANCE FLUID: NORMAL
CELL COUNT FLUID TYPE: NORMAL
COLOR FLUID: NORMAL
FLUID TYPE: NORMAL
GLUCOSE, FLUID: 173 MG/DL
INR BLD: 1.3
LD, FLUID: 67 U/L
METER GLUCOSE: 137 MG/DL (ref 74–99)
METER GLUCOSE: 200 MG/DL (ref 74–99)
METER GLUCOSE: 368 MG/DL (ref 74–99)
METER GLUCOSE: 404 MG/DL (ref 74–99)
MONOCYTE, FLUID: 87 %
NEUTROPHIL, FLUID: 13 %
NUCLEATED CELLS FLUID: 174 /UL
PROTHROMBIN TIME: 14.7 SEC (ref 9.3–12.4)
RBC FLUID: <2000 /UL

## 2022-10-18 PROCEDURE — C1729 CATH, DRAINAGE: HCPCS

## 2022-10-18 PROCEDURE — 88112 CYTOPATH CELL ENHANCE TECH: CPT

## 2022-10-18 PROCEDURE — 87116 MYCOBACTERIA CULTURE: CPT

## 2022-10-18 PROCEDURE — 32555 ASPIRATE PLEURA W/ IMAGING: CPT

## 2022-10-18 PROCEDURE — 36415 COLL VENOUS BLD VENIPUNCTURE: CPT

## 2022-10-18 PROCEDURE — 2060000000 HC ICU INTERMEDIATE R&B

## 2022-10-18 PROCEDURE — 94660 CPAP INITIATION&MGMT: CPT

## 2022-10-18 PROCEDURE — 2700000000 HC OXYGEN THERAPY PER DAY

## 2022-10-18 PROCEDURE — 0W993ZZ DRAINAGE OF RIGHT PLEURAL CAVITY, PERCUTANEOUS APPROACH: ICD-10-PCS | Performed by: FAMILY MEDICINE

## 2022-10-18 PROCEDURE — 2500000003 HC RX 250 WO HCPCS: Performed by: FAMILY MEDICINE

## 2022-10-18 PROCEDURE — 87015 SPECIMEN INFECT AGNT CONCNTJ: CPT

## 2022-10-18 PROCEDURE — 6370000000 HC RX 637 (ALT 250 FOR IP): Performed by: NURSE PRACTITIONER

## 2022-10-18 PROCEDURE — 88305 TISSUE EXAM BY PATHOLOGIST: CPT

## 2022-10-18 PROCEDURE — 85610 PROTHROMBIN TIME: CPT

## 2022-10-18 PROCEDURE — 2580000003 HC RX 258: Performed by: FAMILY MEDICINE

## 2022-10-18 PROCEDURE — 87206 SMEAR FLUORESCENT/ACID STAI: CPT

## 2022-10-18 PROCEDURE — 83615 LACTATE (LD) (LDH) ENZYME: CPT

## 2022-10-18 PROCEDURE — 6370000000 HC RX 637 (ALT 250 FOR IP): Performed by: FAMILY MEDICINE

## 2022-10-18 PROCEDURE — 92526 ORAL FUNCTION THERAPY: CPT | Performed by: SPEECH-LANGUAGE PATHOLOGIST

## 2022-10-18 PROCEDURE — 6370000000 HC RX 637 (ALT 250 FOR IP): Performed by: INTERNAL MEDICINE

## 2022-10-18 PROCEDURE — 82962 GLUCOSE BLOOD TEST: CPT

## 2022-10-18 PROCEDURE — 74230 X-RAY XM SWLNG FUNCJ C+: CPT

## 2022-10-18 PROCEDURE — 6370000000 HC RX 637 (ALT 250 FOR IP): Performed by: CLINICAL NURSE SPECIALIST

## 2022-10-18 PROCEDURE — 92611 MOTION FLUOROSCOPY/SWALLOW: CPT | Performed by: SPEECH-LANGUAGE PATHOLOGIST

## 2022-10-18 PROCEDURE — 97110 THERAPEUTIC EXERCISES: CPT

## 2022-10-18 PROCEDURE — 87205 SMEAR GRAM STAIN: CPT

## 2022-10-18 PROCEDURE — 71045 X-RAY EXAM CHEST 1 VIEW: CPT

## 2022-10-18 PROCEDURE — 94640 AIRWAY INHALATION TREATMENT: CPT

## 2022-10-18 PROCEDURE — 82947 ASSAY GLUCOSE BLOOD QUANT: CPT

## 2022-10-18 RX ORDER — INSULIN LISPRO 100 [IU]/ML
4 INJECTION, SOLUTION INTRAVENOUS; SUBCUTANEOUS ONCE
Status: COMPLETED | OUTPATIENT
Start: 2022-10-18 | End: 2022-10-18

## 2022-10-18 RX ADMIN — LORAZEPAM 0.5 MG: 0.5 TABLET ORAL at 22:56

## 2022-10-18 RX ADMIN — CARBIDOPA AND LEVODOPA 2 TABLET: 25; 100 TABLET, EXTENDED RELEASE ORAL at 14:26

## 2022-10-18 RX ADMIN — PANTOPRAZOLE SODIUM 40 MG: 40 TABLET, DELAYED RELEASE ORAL at 08:17

## 2022-10-18 RX ADMIN — SUCRALFATE 1 G: 1 TABLET ORAL at 12:21

## 2022-10-18 RX ADMIN — Medication 10 ML: at 08:17

## 2022-10-18 RX ADMIN — INSULIN GLARGINE 13 UNITS: 100 INJECTION, SOLUTION SUBCUTANEOUS at 08:18

## 2022-10-18 RX ADMIN — CARBIDOPA AND LEVODOPA 2 TABLET: 25; 100 TABLET, EXTENDED RELEASE ORAL at 21:29

## 2022-10-18 RX ADMIN — IPRATROPIUM BROMIDE AND ALBUTEROL SULFATE 1 AMPULE: .5; 2.5 SOLUTION RESPIRATORY (INHALATION) at 09:53

## 2022-10-18 RX ADMIN — MONTELUKAST 10 MG: 10 TABLET, FILM COATED ORAL at 21:29

## 2022-10-18 RX ADMIN — CARBIDOPA AND LEVODOPA 2 TABLET: 25; 100 TABLET, EXTENDED RELEASE ORAL at 08:16

## 2022-10-18 RX ADMIN — IPRATROPIUM BROMIDE AND ALBUTEROL SULFATE 1 AMPULE: .5; 2.5 SOLUTION RESPIRATORY (INHALATION) at 06:11

## 2022-10-18 RX ADMIN — INSULIN LISPRO 4 UNITS: 100 INJECTION, SOLUTION INTRAVENOUS; SUBCUTANEOUS at 12:22

## 2022-10-18 RX ADMIN — BARIUM SULFATE 45 ML: 400 PASTE ORAL at 13:55

## 2022-10-18 RX ADMIN — SUCRALFATE 1 G: 1 TABLET ORAL at 21:29

## 2022-10-18 RX ADMIN — INSULIN LISPRO 4 UNITS: 100 INJECTION, SOLUTION INTRAVENOUS; SUBCUTANEOUS at 22:56

## 2022-10-18 RX ADMIN — INSULIN GLARGINE 13 UNITS: 100 INJECTION, SOLUTION SUBCUTANEOUS at 21:35

## 2022-10-18 RX ADMIN — INSULIN LISPRO 16 UNITS: 100 INJECTION, SOLUTION INTRAVENOUS; SUBCUTANEOUS at 17:36

## 2022-10-18 RX ADMIN — IPRATROPIUM BROMIDE AND ALBUTEROL SULFATE 1 AMPULE: .5; 2.5 SOLUTION RESPIRATORY (INHALATION) at 18:26

## 2022-10-18 RX ADMIN — ROPINIROLE HYDROCHLORIDE 1 MG: 1 TABLET, FILM COATED ORAL at 21:29

## 2022-10-18 RX ADMIN — APIXABAN 5 MG: 5 TABLET, FILM COATED ORAL at 21:29

## 2022-10-18 RX ADMIN — MIRTAZAPINE 15 MG: 15 TABLET, ORALLY DISINTEGRATING ORAL at 21:29

## 2022-10-18 RX ADMIN — SUCRALFATE 1 G: 1 TABLET ORAL at 17:36

## 2022-10-18 RX ADMIN — Medication 10 ML: at 21:29

## 2022-10-18 RX ADMIN — ATORVASTATIN CALCIUM 40 MG: 40 TABLET, FILM COATED ORAL at 21:29

## 2022-10-18 RX ADMIN — INSULIN LISPRO 4 UNITS: 100 INJECTION, SOLUTION INTRAVENOUS; SUBCUTANEOUS at 21:36

## 2022-10-18 RX ADMIN — BARIUM SULFATE 45 G: 0.81 POWDER, FOR SUSPENSION ORAL at 13:56

## 2022-10-18 RX ADMIN — ROPINIROLE HYDROCHLORIDE 1 MG: 1 TABLET, FILM COATED ORAL at 08:16

## 2022-10-18 RX ADMIN — METOPROLOL SUCCINATE 100 MG: 50 TABLET, EXTENDED RELEASE ORAL at 21:29

## 2022-10-18 RX ADMIN — SUCRALFATE 1 G: 1 TABLET ORAL at 08:16

## 2022-10-18 RX ADMIN — METOPROLOL SUCCINATE 100 MG: 50 TABLET, EXTENDED RELEASE ORAL at 08:17

## 2022-10-18 RX ADMIN — PREDNISONE 15 MG: 5 TABLET ORAL at 08:17

## 2022-10-18 RX ADMIN — FUROSEMIDE 40 MG: 40 TABLET ORAL at 08:17

## 2022-10-18 RX ADMIN — BARIUM SULFATE 45 ML: 400 SUSPENSION ORAL at 13:56

## 2022-10-18 RX ADMIN — ROPINIROLE HYDROCHLORIDE 1 MG: 1 TABLET, FILM COATED ORAL at 14:26

## 2022-10-18 RX ADMIN — ACETAMINOPHEN 650 MG: 325 TABLET ORAL at 21:29

## 2022-10-18 ASSESSMENT — PAIN DESCRIPTION - ORIENTATION: ORIENTATION: LEFT

## 2022-10-18 ASSESSMENT — ENCOUNTER SYMPTOMS
VOMITING: 0
NAUSEA: 0
SHORTNESS OF BREATH: 1

## 2022-10-18 ASSESSMENT — PAIN SCALES - GENERAL
PAINLEVEL_OUTOF10: 0
PAINLEVEL_OUTOF10: 7
PAINLEVEL_OUTOF10: 0
PAINLEVEL_OUTOF10: 5
PAINLEVEL_OUTOF10: 5

## 2022-10-18 ASSESSMENT — PAIN SCALES - WONG BAKER: WONGBAKER_NUMERICALRESPONSE: 0

## 2022-10-18 ASSESSMENT — PAIN DESCRIPTION - LOCATION: LOCATION: OTHER (COMMENT)

## 2022-10-18 ASSESSMENT — PAIN - FUNCTIONAL ASSESSMENT: PAIN_FUNCTIONAL_ASSESSMENT: PREVENTS OR INTERFERES WITH MANY ACTIVE NOT PASSIVE ACTIVITIES

## 2022-10-18 ASSESSMENT — PAIN DESCRIPTION - PAIN TYPE: TYPE: ACUTE PAIN

## 2022-10-18 ASSESSMENT — PAIN DESCRIPTION - DESCRIPTORS: DESCRIPTORS: ACHING;DISCOMFORT

## 2022-10-18 ASSESSMENT — PAIN DESCRIPTION - FREQUENCY: FREQUENCY: INTERMITTENT

## 2022-10-18 NOTE — PROGRESS NOTES
Physical Therapy Treatment Note/Plan of Care    Room #:  1624/0996-30  Patient Name: Sameera Edgar  YOB: 1950  MRN: [id number]    Date of Service: 10/18/2022     Tentative placement recommendation: [facility] with Physical Therapy   Equipment recommendation: Equipment at Nursing Home      Evaluating Physical Therapist: Steven Leon      Specific Provider Orders/Date/Referring Provider :  10/12/22 2130    PT evaluation and treat  Start:  10/12/22 2130,   End:  10/12/22 2130,   ONE TIME,   Standing Count:  1 Occurrences,   R         Marvin Ser, DO     Admitting Diagnosis:   COPD exacerbation ([address] [J44.1]  Acute on chronic respiratory failure with hypercapnia ([address] [J96.22]  Acute on chronic congestive heart failure, unspecified heart failure type ([address] [I50.9]     shortness of breath  Surgery: none  Visit Diagnoses         Codes    Acute on chronic respiratory failure with hypercapnia ([address]    -  Primary J96.22    Postprocedural pneumothorax     J95.811            Patient Active Problem List   Diagnosis    Depression with anxiety    Osteoporosis    Asthma    Hyperlipidemia    Mitral regurgitation    Obstructive sleep apnea syndrome    Psoriasis    Diabetes mellitus ([address]    Parkinson's disease ([address]    Primary hypertension    Microalbuminuria    Morbid obesity ([address]    RLS (restless legs syndrome)    Generalized weakness    Inability to walk    Hypothyroidism    Chest pain    Acute asthma exacerbation    Asthma exacerbation, mild    Paroxysmal atrial fibrillation (HCC)    Atrial fibrillation with RVR ([address]    Acute on chronic congestive heart failure ([address]    Coronary artery disease involving native coronary artery of native heart without angina pectoris    Dysphagia    Hepatosplenomegaly    Acute decompensated heart failure (HCC)    Acute diastolic (congestive) heart failure (HCC)    Nonrheumatic tricuspid valve regurgitation    Tobacco abuse    Recurrent syncope Septic shock ([address]    Seizure-like activity (HCC)    SHANTANU (acute kidney injury) ([address]    Pancytopenia (HCC)    Thrombocytopenia (HCC)    Encephalopathy    Acute respiratory failure with hypoxia (HCC)    Lactic acidosis    Delirium    Acute on chronic anemia    Pleural effusion, right    Acute respiratory failure with hypoxia and hypercapnia (HCC)    Acute confusion    Chronic diastolic (congestive) heart failure (HCC)    Macrocytosis    Other specified anemias    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[address]    Puerperal sepsis with acute hypoxic respiratory failure without septic shock (HCC)    Pulmonary HTN (HCC)    Chronic anticoagulation    RVF (right ventricular failure) (HCC)    Metabolic alkalosis    Sepsis (HCC)    COPD exacerbation (HCC)    Acute on chronic respiratory failure with hypercapnia (HCC)    Persistent atrial fibrillation (HCC)        ASSESSMENT of Current Deficits Patient exhibits decreased strength, balance, and endurance impairing functional mobility, transfers, gait , gait distance, and tolerance to activity Patient fatigued from testing today, agreeable to seated exercises in bed. Patient demonstrates slightly LE tremor with exercises. Patient requires continued skilled physical therapy to address concerns listed above for increased safety and function at discharge.          PHYSICAL THERAPY  PLAN OF CARE       Physical therapy plan of care is established based on physician order,  patient diagnosis and clinical assessment    Current Treatment Recommendations:    -Bed Mobility: Lower extremity exercises   -Sitting Balance: Incorporate reaching activities to activate trunk muscles   -Standing Balance: Perform strengthening exercises in standing to promote motor control with or without upper extremity support   -Transfers: Provide instruction on proper hand and foot position for adequate transfer of weight onto lower extremities and use of gait device if needed and Cues for hand placement, technique and safety. Provide stabilization to prevent fall   -Gait: Gait training, Standing activities to improve: base of support, weight shift, weight bearing , and Pregait training to emphasize: Sequencing , Device control, Upright, and Safety   -Endurance: Utilize Supervised activities to increase level of endurance to allow for safe functional mobility including transfers and gait     PT long term treatment goals are located in below grid    Patient and or family understand(s) diagnosis, prognosis, and plan of care. Frequency of treatments: Patient will be seen  daily.          Prior Level of Function: Patient ambulated with wheeled walker   short distance, transfers to wheelchair  Rehab Potential: good   for baseline    Past medical history:   Past Medical History:   Diagnosis Date    A-fib ([facility]     Acute on chronic congestive heart failure (HCC)     Anxiety     Asthma     CAD (coronary artery disease) 01/21/2016    Cancer ([facility]  breast ca 2006    right lumpectomy    Chronic kidney disease     nephrolithiasis    COPD exacerbation ([facility] 10/12/2022    Depression     Diabetes mellitus ([facility]     H/O mammogram     Hx MRSA infection     toe infection january 2012    Hyperlipidemia     Hypertension     Lateral epicondylitis     Morbid obesity ([address]     SERENA on CPAP     Oxygen dependent     Parkinson's disease ([address]     Tubal ligation status      Past Surgical History:   Procedure Laterality Date    BREAST LUMPECTOMY      BREAST REDUCTION SURGERY      CARDIAC CATHETERIZATION  4/28/2014    Dr. Carrington Núñez  01/11/2022    Dr. Geovani Baez  7/29/15    CT GUIDED CHEST TUBE  7/8/2022    CT GUIDED CHEST TUBE 7/8/2022 Donna Guerra MD [facility]    ENDOSCOPY, COLON, DIAGNOSTIC  7/19/15    GALLBLADDER SURGERY      LUMBAR LAMINECTOMY      TOE AMPUTATION      TONSILLECTOMY      UPPER GASTROINTESTINAL ENDOSCOPY      UPPER GASTROINTESTINAL ENDOSCOPY N/A 7/19/2022    EGD ESOPHAGOGASTRODUODENOSCOPY performed by Sultana Godfrey MD at [address]:    Precautions: Up as tolerated, falls ,   O2, Parkinson's disease    Imaging results: XR CHEST (2 VW)    Result Date: 10/12/2022  EXAMINATION: TWO XRAY VIEWS OF THE CHEST 10/12/2022 5:19 pm      Cardiomegaly with persistent interstitial and alveolar pulmonary edema which is most pronounced on the right. CT HEAD WO CONTRAST    Result Date: 10/9/2022  EXAMINATION: CT OF THE HEAD WITHOUT CONTRAST  10/9/2022 12:47 pm      No acute intracranial abnormality. CT ABDOMEN PELVIS W IV CONTRAST Additional Contrast? None    Result Date: 10/6/2022  EXAMINATION: CT OF THE ABDOMEN AND PELVIS WITH CONTRAST 10/6/2022 11:33 am    Subtle suggestion of hepatic steatosis and cirrhosis including portal venous hypertension with splenomegaly. Small volume abdominal ascites and mild anasarca. Bilateral lower lobe infiltrates and pleural effusions, right more than left. Pneumonia is suspected. Stable benign left adrenal adenoma. No imaging follow-up is required. Chronic left renal scarring probably due to remote infection. Small collection of gas within the endometrial cavity presumably iatrogenically introduced. No definite uterine pathology is appreciated. No focal uterine fluid collections. The uterus could be further evaluated sonographically if clinically indicated. Stool-filled colon particularly on the right suggesting constipation. XR CHEST PORTABLE    Result Date: 10/8/2022  EXAMINATION: ONE XRAY VIEW OF THE CHEST 10/8/2022 7:04 am      Redemonstration of interstitial pulmonary edema or pneumonia bilaterally appearing to have increased in right lung base.     Amaury Diasen is most likely a confluence of pulmonary vessels. US DUP UPPER EXTREMITY LEFT VENOUS    Result Date: 10/8/2022  EXAMINATION: VENOUS ULTRASOUND OF THE LEFT UPPER EXTREMITY, 10/8/2022 6:54 pm      No evidence of DVT.  RECOMMENDATIONS: Unavailable     44e  Social history: Patient lives in a skilled nursing facility [place] [id number] owned: [place], [address], and [place],       [address] [facility] [address]   How much difficulty turning over in bed?: A Little  How much difficulty sitting down on / standing up from a chair with arms?: A Little  How much difficulty moving from lying on back to sitting on side of bed?: A Little  How much help from another person moving to and from a bed to a chair?: A Little  How much help from another person needed to walk in hospital room?: Total  How much help from another person for climbing 3-5 steps with a railing?: Total  AM-PAC Inpatient Mobility Raw Score : 14  AM-PAC Inpatient T-Scale Score : 38.1  Mobility Inpatient CMS 0-100% Score: 61.29  Mobility Inpatient CMS G-Code Modifier : CL    Nursing cleared patient for PT treatment. Patient sitting in bedside chair at start of session. OBJECTIVE;   Initial Evaluation  Date: 10/13/2022 Treatment Date:    10/18/2022   Short Term/ Long Term   Goals   Was pt agreeable to Eval/treatment? Yes yes To be met in 3 days   Pain level   0/10    0/10    Bed Mobility    Rolling: Not assessed     Supine to sit: Minimal assist of 1    Sit to supine: Not assessed     Scooting: Minimal assist of 1   Rolling: Not assessed patient in chair    Rolling: Independent    Supine to sit:  Independent    Sit to supine: Independent    Scooting: Independent     Transfers Sit to stand: Minimal assist of 1 Cues for hand placement and safety  Sit to stand: Not assessed     Sit to stand: Supervision      Ambulation     5 feet using  wheeled walker with Minimal assist of 1   for walker control and cues for sequencing, upright posture, and safety not assessed    20 feet using  wheeled walker with Supervision     Stair negotiation: ascended and descended   Not assessed          ROM Within functional limits     Increase range of motion 10% of affected joints    Strength BUE:  refer to OT eval  RLE:  3+ 4-/5  LLE:  3+ 4-/5  Increase strength in affected mm groups by 1/3 grade   Balance Sitting EOB:  good -  Dynamic Standing:  fair wheeled walker  Sitting EOB: not assessed   Dynamic Standing: not assessed    Sitting EOB:  good    Dynamic Standing: good -wheeled walker      Patient is Alert & Oriented x person, place, time, and situation and follows directions    Sensation:  Patient  reports numbness/tingling bilateral toes     Edema:  yes bilateral lower extremities   Endurance: fair       Vitals:  4 liters high flow nasal cannula   Blood Pressure at rest  Blood Pressure during session    Heart Rate at rest  Heart Rate during session    SPO2 at rest %  SPO2 during session %     Patient education  Patient educated on role of Physical Therapy, risks of immobility, safety and plan of care, energy conservation,  importance of mobility while in hospital , safety , and seated exercises      Patient response to education:   Pt verbalized understanding Pt demonstrated skill Pt requires further education in this area   Yes Partial Yes      Treatment:  Patient practiced and was instructed/facilitated in the following treatment: Patient performed seated exercises in chair. Therapeutic Exercises:  ankle pumps, long arc quad, seated marching, and quad eccentric contraction (x5reps), manually resisted hip abduction   x 15-20 reps. At end of session, patient in chair with     call light and phone within reach,  all lines and tubes intact, nursing notified. Patient would benefit from continued skilled Physical Therapy to improve functional independence and quality of life.          Patient's/ family goals   Return to [facility]    Time in  256  Time out  313    Total Treatment Time  17 minutes      CPT codes:  Therapeutic exercises (71608)   17 minutes  1 unit(s)    Julio Higginbotham, [facility]   #[phone]

## 2022-10-18 NOTE — CARE COORDINATION
10/18/22 1730 ZACHERY note: COVID (-) 10/12/22. 3L nc. Pt plans to return to [facility]; however her bedhold days were up on Sunday so this will be pending bed availability and pts daughter completing the necessary paperwork/payments arrangements with [facility]. Spoke with pts daughter Anahy Ritter today and she also wants pt to return to [place] states she's having issues with the ladies the office at [facility] and she doesn't know what to do. Explained to Anahy Ritter that we can not proceed with getting her mom back to [facility] unless she completes the necessary arrangements with the ladies in [place] [facility]. Lilliana requests I reach out to our [facility] and have her request the office contact her which I completed. Will await response from [facility]. CM will follow.  Electronically signed by Ashia Varner RN on 10/18/2022 at 5:38 PM

## 2022-10-18 NOTE — PLAN OF CARE
Problem: Discharge Planning  Goal: Discharge to home or other facility with appropriate resources  Outcome: Progressing  Flowsheets (Taken 10/17/2022 2000)  Discharge to home or other facility with appropriate resources:   Identify barriers to discharge with patient and caregiver   Arrange for needed discharge resources and transportation as appropriate   Identify discharge learning needs (meds, wound care, etc)     Problem: Safety - Adult  Goal: Free from fall injury  Outcome: Progressing  Flowsheets (Taken 10/17/2022 2000)  Free From Fall Injury:   Instruct family/caregiver on patient safety   Based on caregiver fall risk screen, instruct family/caregiver to ask for assistance with transferring infant if caregiver noted to have fall risk factors     Problem: ABCDS Injury Assessment  Goal: Absence of physical injury  Outcome: Progressing  Flowsheets (Taken 10/17/2022 2000)  Absence of Physical Injury: Implement safety measures based on patient assessment     Problem: Skin/Tissue Integrity  Goal: Absence of new skin breakdown  Description: 1. Monitor for areas of redness and/or skin breakdown  2. Assess vascular access sites hourly  3. Every 4-6 hours minimum:  Change oxygen saturation probe site  4. Every 4-6 hours:  If on nasal continuous positive airway pressure, respiratory therapy assess nares and determine need for appliance change or resting period.   Outcome: Progressing     Problem: Chronic Conditions and Co-morbidities  Goal: Patient's chronic conditions and co-morbidity symptoms are monitored and maintained or improved  Outcome: Progressing  Flowsheets (Taken 10/17/2022 2000)  Care Plan - Patient's Chronic Conditions and Co-Morbidity Symptoms are Monitored and Maintained or Improved:   Monitor and assess patient's chronic conditions and comorbid symptoms for stability, deterioration, or improvement   Collaborate with multidisciplinary team to address chronic and comorbid conditions and prevent exacerbation or deterioration   Update acute care plan with appropriate goals if chronic or comorbid symptoms are exacerbated and prevent overall improvement and discharge     Problem: Pain  Goal: Verbalizes/displays adequate comfort level or baseline comfort level  Outcome: Progressing

## 2022-10-18 NOTE — PROGRESS NOTES
Pulmonary/Critical Care Progress Note    We are following patient for acute superimposed on chronic hypoxemic and hypercapnic respiratory failure, exacerbation of COPD, question right lower lobe pneumonia, alveolar hypoventilation, CKD, CHF, atrial fibrillation, diabetes mellitus type 2, morbid obesity, obstructive sleep apnea, history of Parkinson's disease      SUBJECTIVE:  Patient continues to improve. She is less short of breath. Heart rate is much better controlled. She underwent thoracentesis today. Fluid is transudative. Currently on 3 L nasal cannula. MEDICATIONS:   predniSONE  15 mg Oral Daily    furosemide  40 mg Oral Daily    atorvastatin  40 mg Oral Nightly    sodium chloride flush  5-40 mL IntraVENous 2 times per day    [Held by provider] apixaban  5 mg Oral BID    carbidopa-levodopa  2 tablet Oral TID    insulin glargine  13 Units SubCUTAneous BID    metoprolol succinate  100 mg Oral BID    mirtazapine  15 mg Oral Nightly    montelukast  10 mg Oral Nightly    pantoprazole  40 mg Oral QAM    rOPINIRole  1 mg Oral TID    sucralfate  1 g Oral 4x Daily    insulin lispro  0-16 Units SubCUTAneous TID WC    insulin lispro  0-4 Units SubCUTAneous Nightly    ipratropium-albuterol  1 ampule Inhalation Q4H      dextrose      sodium chloride       glucose, dextrose bolus **OR** dextrose bolus, glucagon (rDNA), dextrose, sodium chloride flush, sodium chloride, ondansetron **OR** ondansetron, polyethylene glycol, acetaminophen **OR** acetaminophen, docusate sodium, LORazepam, loperamide, promethazine      REVIEW OF SYSTEMS:  Constitutional: Denies fever, weight loss, night sweats, and fatigue  Skin: Denies pigmentation, dark lesions, and rashes   HEENT: Denies hearing loss, tinnitus, ear drainage, epistaxis, sore throat, and hoarseness. Cardiovascular: Denies palpitations, chest pain, and chest pressure. Respiratory: Denies cough, dyspnea at rest, hemoptysis, apnea, and choking.   Gastrointestinal: Denies nausea, vomiting, poor appetite, diarrhea, heartburn or reflux  Genitourinary: Denies dysuria, frequency, urgency or hematuria  Musculoskeletal: Denies myalgias, muscle weakness, and bone pain  Neurological: Denies dizziness, vertigo, headache, and focal weakness  Psychological: Denies anxiety and depression  Endocrine: Denies heat intolerance and cold intolerance  Hematopoietic/Lymphatic: Denies bleeding problems and blood transfusions    OBJECTIVE:  Vitals:    10/18/22 1215   BP: 130/82   Pulse: 88   Resp: 20   Temp:    SpO2: 98%     FiO2 : 35 %  O2 Flow Rate (L/min): 3 L/min  O2 Device: Nasal cannula    PHYSICAL EXAM:  Constitutional: No fever, chills, diaphoresis  Skin: No skin rash, no skin breakdown  HEENT: Unremarkable  Neck: No JVD, lymphadenopathy, thyromegaly  Cardiovascular: S1, S2 irregular. Grade 1/6 to 2/6 systolic ejection murmur left sternal border  Respiratory: Few crackles right base, minimal end expiratory wheezes bilaterally  Gastrointestinal: Soft, obese, nontender  Genitourinary: No CVA tenderness  Extremities:, No clubbing, cyanosis, or edema  Neurological: Awake, alert, oriented x3. No evidence of focal motor or sensory deficits  Psychological: In good spirits. Appropriate affect. Very pleased with her progress.     LABS:  WBC   Date Value Ref Range Status   10/16/2022 7.1 4.5 - 11.5 E9/L Final   10/14/2022 7.4 4.5 - 11.5 E9/L Final   10/13/2022 5.4 4.5 - 11.5 E9/L Final     Hemoglobin   Date Value Ref Range Status   10/16/2022 9.9 (L) 11.5 - 15.5 g/dL Final   10/14/2022 10.0 (L) 11.5 - 15.5 g/dL Final   10/13/2022 10.0 (L) 11.5 - 15.5 g/dL Final     Hematocrit   Date Value Ref Range Status   10/16/2022 33.8 (L) 34.0 - 48.0 % Final   10/14/2022 34.8 34.0 - 48.0 % Final   10/13/2022 34.3 34.0 - 48.0 % Final     MCV   Date Value Ref Range Status   10/16/2022 92.6 80.0 - 99.9 fL Final   10/14/2022 93.5 80.0 - 99.9 fL Final   10/13/2022 91.0 80.0 - 99.9 fL Final     Platelets   Date Value Ref Range Status   10/16/2022 247 130 - 450 E9/L Final   10/14/2022 269 130 - 450 E9/L Final   10/13/2022 233 130 - 450 E9/L Final     Sodium   Date Value Ref Range Status   10/16/2022 139 132 - 146 mmol/L Final   10/14/2022 139 132 - 146 mmol/L Final   10/13/2022 141 132 - 146 mmol/L Final     Potassium   Date Value Ref Range Status   10/16/2022 4.3 3.5 - 5.0 mmol/L Final   10/14/2022 4.8 3.5 - 5.0 mmol/L Final   10/08/2022 4.0 3.5 - 5.0 mmol/L Final     Potassium reflex Magnesium   Date Value Ref Range Status   10/13/2022 4.2 3.5 - 5.0 mmol/L Final   10/12/2022 4.3 3.5 - 5.0 mmol/L Final   10/05/2022 4.2 3.5 - 5.0 mmol/L Final     Chloride   Date Value Ref Range Status   10/16/2022 93 (L) 98 - 107 mmol/L Final   10/14/2022 93 (L) 98 - 107 mmol/L Final   10/13/2022 99 98 - 107 mmol/L Final     CO2   Date Value Ref Range Status   10/16/2022 39 (H) 22 - 29 mmol/L Final   10/14/2022 38 (H) 22 - 29 mmol/L Final   10/13/2022 32 (H) 22 - 29 mmol/L Final     BUN   Date Value Ref Range Status   10/16/2022 29 (H) 6 - 23 mg/dL Final   10/14/2022 27 (H) 6 - 23 mg/dL Final   10/13/2022 19 6 - 23 mg/dL Final     Creatinine   Date Value Ref Range Status   10/16/2022 0.9 0.5 - 1.0 mg/dL Final   10/14/2022 1.1 (H) 0.5 - 1.0 mg/dL Final   10/13/2022 0.9 0.5 - 1.0 mg/dL Final     Glucose   Date Value Ref Range Status   10/16/2022 230 (H) 74 - 99 mg/dL Final   10/14/2022 334 (H) 74 - 99 mg/dL Final   10/13/2022 215 (H) 74 - 99 mg/dL Final     Calcium   Date Value Ref Range Status   10/16/2022 8.9 8.6 - 10.2 mg/dL Final   10/14/2022 8.8 8.6 - 10.2 mg/dL Final   10/13/2022 8.1 (L) 8.6 - 10.2 mg/dL Final     Total Protein   Date Value Ref Range Status   10/14/2022 6.3 (L) 6.4 - 8.3 g/dL Final   10/13/2022 5.6 (L) 6.4 - 8.3 g/dL Final   10/08/2022 4.8 (L) 6.4 - 8.3 g/dL Final     Albumin   Date Value Ref Range Status   10/14/2022 3.5 3.5 - 5.2 g/dL Final   10/13/2022 3.1 (L) 3.5 - 5.2 g/dL Final   10/08/2022 2.5 (L) 3.5 - 5.2 g/dL Final Total Bilirubin   Date Value Ref Range Status   10/14/2022 0.3 0.0 - 1.2 mg/dL Final   10/13/2022 0.3 0.0 - 1.2 mg/dL Final   10/08/2022 0.4 0.0 - 1.2 mg/dL Final     Alkaline Phosphatase   Date Value Ref Range Status   10/14/2022 98 35 - 104 U/L Final   10/13/2022 87 35 - 104 U/L Final   10/08/2022 74 35 - 104 U/L Final     AST   Date Value Ref Range Status   10/14/2022 7 0 - 31 U/L Final   10/13/2022 12 0 - 31 U/L Final     Comment:     Specimen is slightly Hemolyzed. Result may be artificially increased. 10/08/2022 7 0 - 31 U/L Final     ALT   Date Value Ref Range Status   10/14/2022 <5 0 - 32 U/L Final   10/13/2022 <5 0 - 32 U/L Final   10/08/2022 5 0 - 32 U/L Final     GFR Non-   Date Value Ref Range Status   10/16/2022 >60 >=60 mL/min/1.73 Final     Comment:     Chronic Kidney Disease: less than 60 ml/min/1.73 sq.m. Kidney Failure: less than 15 ml/min/1.73 sq.m. Results valid for patients 18 years and older. 10/14/2022 49 >=60 mL/min/1.73 Final     Comment:     Chronic Kidney Disease: less than 60 ml/min/1.73 sq.m. Kidney Failure: less than 15 ml/min/1.73 sq.m. Results valid for patients 18 years and older. 10/13/2022 >60 >=60 mL/min/1.73 Final     Comment:     Chronic Kidney Disease: less than 60 ml/min/1.73 sq.m. Kidney Failure: less than 15 ml/min/1.73 sq.m. Results valid for patients 18 years and older. GFR    Date Value Ref Range Status   10/16/2022 >60  Final   10/14/2022 59  Final   10/13/2022 >60  Final     Magnesium   Date Value Ref Range Status   10/14/2022 2.1 1.6 - 2.6 mg/dL Final   10/08/2022 1.9 1.6 - 2.6 mg/dL Final   10/07/2022 1.8 1.6 - 2.6 mg/dL Final     Phosphorus   Date Value Ref Range Status   10/08/2022 3.0 2.5 - 4.5 mg/dL Final   10/07/2022 2.8 2.5 - 4.5 mg/dL Final   10/06/2022 2.6 2.5 - 4.5 mg/dL Final     No results for input(s): PH, PO2, PCO2, HCO3, BE, O2SAT in the last 72 hours.       RADIOLOGY:  XR CHEST 1 VIEW   Final Result   1. There is no pneumothorax status post right thoracentesis. XR CHEST PORTABLE   Final Result   Worsening right lower lobe infiltrate and effusion         XR CHEST PORTABLE   Final Result   Right lower lobe opacity possibly representing atelectasis. CT CHEST W CONTRAST   Final Result   1. Interlobular septal thickening in the upper lungs, bilateral pleural   effusions greater on the right, 4 chamber cardiac enlargement, pericardial   effusion and hepatosplenomegaly. Trace right upper quadrant perihepatic   ascites. Findings suggest volume overload/CHF. 2.  Partial passive atelectasis of the lower lobes, right middle lobe and   lingular segment. 3.  Prominent main pulmonary artery, consider pulmonary artery hypertension. No filling defects in the pulmonary artery to suggest embolism. 4.  Postop changes in the right axilla and right breast.         XR CHEST (2 VW)   Final Result   Cardiomegaly with persistent interstitial and alveolar pulmonary edema which   is most pronounced on the right. IR GUIDED THORACENTESIS PLEURAL    (Results Pending)   FL MODIFIED BARIUM SWALLOW W VIDEO    (Results Pending)           PROBLEM LIST:  Principal Problem:    COPD exacerbation ([address]  Active Problems:    Acute on chronic respiratory failure with hypercapnia (HCC)    Persistent atrial fibrillation (HCC)  Resolved Problems:    * No resolved hospital problems.  *      IMPRESSION:  Acute, superimposed on chronic hypoxemic and hypercapnic respiratory failure  Morbid obesity  Obstructive sleep apnea  Alveolar hypoventilation syndrome  Diabetes mellitus type 2  HFpEF  Possible right lower lobe pneumonia  Severe pulmonary hypertension with an element of right heart failure  Atrial fibrillation on Eliquis with controlled ventricular response  Chronic kidney disease  Small bilateral pleural effusions (transudative right-sided pleural effusion on October 18, 2022)  Severe COPD with exacerbation  Cor pulmonale    PLAN:  Discontinue antibiotics  BiPAP as needed and nightly  Continue inhaled bronchodilators   Chest x-ray 10/17/22 showed worsening right-sided pleural effusion  PT and OT are essential and will continue  Eliquis was resumed. thoracentesis was completed. Patient had a thoracentesis in August 26, 2022.   Wean FiO2 as tolerated and assess the need for home oxygen before discharge      Electronically signed by Freddy Gardiner MD on 10/18/2022 at 3:47 PM

## 2022-10-18 NOTE — PROGRESS NOTES
Physical Therapy        0515/0515-02    Patient unavailable for physical therapy treatment due to patient out of room for xray. Will attempt session at later time/date.      Petey Fox, PTA  #[id number]

## 2022-10-18 NOTE — PROGRESS NOTES
Pharmacy Consultation Note  (Antibiotic Dosing and Monitoring)    Vancomycin has been discontinued; pharmacy will sign-off. Please reconsult if needed.     Thank you,  Tamera Boogie, PharmD, BCPS 10/18/2022 8:49 AM

## 2022-10-18 NOTE — PROGRESS NOTES
Physical Therapy        0515/0515-02    Patient unavailable for physical therapy treatment due to patient being out of room at [facility]. Will attempt session at later time/date.      Mily Reaves, PTA  #[id number]

## 2022-10-18 NOTE — PROGRESS NOTES
SPEECH/LANGUAGE PATHOLOGY  VIDEOFLUOROSCOPIC STUDY OF SWALLOWING (MBS)   and PLAN OF CARE    PATIENT NAME:  Deanna Carpio  (female)     MRN:  [id number]    :  1950  (66 y.o.)  STATUS:  Inpatient: Room 0515/0515-02    TODAY'S DATE:  10/18/2022  REFERRING PROVIDER:   Dr. Solo Orta: SLP video swallow  Date of order:  10/18/2022   REASON FOR REFERRAL: dysphagia    EVALUATING THERAPIST: ONEIL Gandara      RESULTS:      DYSPHAGIA DIAGNOSIS:  moderate oropharyngeal phase dysphagia     DIET RECOMMENDATIONS:  Soft and bite size consistency solids (IDDSI level 6) with  nectar consistency (mildly thick - IDDSI level 2) liquids (with left head turn and no straw)    FEEDING RECOMMENDATIONS:    Assistance level:  Set-up is required for all oral intake, Supervision is needed during all oral intake     Compensatory strategies recommended: Double swallow, Small bites/sips, Alternate solids and liquids, Throat clear, No straw, and Head TURN to left     Discussed recommendations with nursing and/or faxed report to referring provider: Yes    Laryngeal Penetration and Aspiration:  Penetration WITHOUT aspiration was observed in today's study with  thin liquid, mildly thick liquid (nectar) with left head turn   Penetration WITH aspiration was observed in 's study with  thin liquid,--trace even with left head turn x1,  mildly thick liquid (nectar)--with chin [facility]    SPEECH THERAPY  PLAN OF CARE   The dysphagia POC is established based on physician order and dysphagia diagnosis    Skilled SLP intervention for dysphagia management up to 5x per week until goals met, pt plateaus in function and/or discharged from hospital      Conditions Requiring Skilled Therapeutic Intervention for dysphagia:    Reduced laryngeal closure resulting in penetration  Reduced laryngeal closure resulting in aspiration   Swallow triggered when bolus head at level of laryngeal surface of epiglottis increasing risk of aspiration    SPECIFIC DYSPHAGIA INTERVENTIONS TO INCLUDE:     Training in positioning for improved integrity of swallow  Compensatory strategy training   Therapeutic exercises  Trials of upgraded diet/liquid     Specific instructions for next treatment:  development and training of compensatory swallow strategies to improve airway protection and swallow function  Treatment Goals:    Short Term Goals:  Pt will implement identified compensatory swallowing strategies on 90% of opportunities or greater to improve airway protection and swallow function. Pt will complete laryngeal strength/ ROM therapeutic exercises to improve airway protection for the least restrictive PO diet minimal verbal prompts  Pt will complete Shaker and/or Chin Tuck Against Resistance (CTAR) to increase UES relaxation diameter and increase anterior laryngeal excursion to reduce pharyngeal residuals and reduce risk of pen/asp with minimal verbal prompts. Pt will complete Effortful Swallow therapeutically to target increased oral and base of tongue pressure, increased pharyngeal constrictor contractions, and increased UES relaxation duration to reduce pharyngeal residue with minimal verbal prompts     Long Term Goals:   Pt will improve oropharyngeal swallow function to ensure airway protection during PO intake to maintain adequate nutrition/hydration and decrease signs/symptoms of aspiration to less than 1 x/day.       Patient/family Goal:    To be able to eat regular foods and drink regular liquids    Plan of care discussed with Patient  and Family   The Patient did not demonstrate complete understanding of the diagnosis, prognosis and plan of care and Family understand(s) the diagnosis, prognosis and plan of care     Rehabilitation Potential/Prognosis: fair                      ADMITTING DIAGNOSIS: COPD exacerbation ([facility] [J44.1]  Acute on chronic respiratory failure with hypercapnia (HCC) [J96.22]  Acute on chronic congestive heart failure, unspecified heart failure type ([address] [I50.9]     VISIT DIAGNOSIS:   Visit Diagnoses         Codes    Acute on chronic respiratory failure with hypercapnia (HCC)    -  Primary J96.22    Postprocedural pneumothorax     J95.811                PATIENT REPORT/COMPLAINT: occasional cough    PRIOR LEVEL OF SWALLOW FUNCTION:    Past History of Dysphagia?:  yes    Home diet: mech soft at NH and  nectar consistency (mildly thick - IDDSI level 2) liquids  Current Diet Order:  ADULT DIET; Dysphagia - Soft and Bite Sized; 3 carb choices (45 gm/meal); Low Sodium (2 gm); Mildly Thick (Nectar)    PROCEDURE:  Consistencies Administered During the Evaluation   Liquids: thin liquid, nectar thick liquid, and honey thick liquid   Solids:  pureed foods and solid foods      Method of Intake:   cup, spoon  Self fed      Position:   Seated, upright, Lateral plane    INSTRUMENTAL ASSESSMENT:    MBSImP Results:   Lip closure for intraoral bolus containment resulted in no labial escape. Tongue control during bolus hold maintained a cohesive bolus held between tongue to palate seal. Bolus preparation and mastication resulted in timely and efficient chewing and mashing. Bolus transport/lingual motion was with brisk tongue motion. Oral residue was not observed. There was complete oral clearance. Initiation of the pharyngeal swallow occurred as the bolus head reached the pyriform sinuses. Soft palate elevation resulted in no bolus between the soft palate and the pharyngeal wall. Laryngeal elevation demonstrated partial superior movement of the thyroid cartilage with partial approximation of the arytenoids to the epiglottic petiole. Anterior hyoid excursion demonstrated partial anterior movement. Epiglottic movement resulted in partial inversion. Laryngeal vestibule closure was incomplete, as indicated by a narrow column of air or contrast within the laryngeal vestibule at the height of the swallow.  Pharyngeal stripping wave was present but diminished. Pharyngeal contraction could not be determined due to logistical reasons not related to physiologic impairment. Pharyngoesophageal segment opening was completely distended for complete duration with no obstruction of bolus flow. Tongue base retraction allowed no collection of residue between the retracted tongue base and the posterior pharyngeal wall. Esophageal clearance in the upright position limited esophageal sweep revealed no significant barium retention potential slow esophageal transit this not a diagnostic view of esophagus just a minimal screen.        PENETRATION-ASPIRATION SCALE (PAS):  THIN 7 = Material enters the airway, passes below the vocal folds, and is not ejected from the trachea despite effort   MILDLY THICK 7 = Material enters the airway, passes below the vocal folds, and is not ejected from the trachea despite effort   MODERATELY THICK 2 = Material enters the airway, remains above vocal folds, and is ejected from the airway   PUREE 1 = Material does not enter the airway  HARD SOLID 1 = Material does not enter the airway       COMPENSATORY STRATEGIES    Compensatory strategies that were beneficial included Head TURN to left     Compensatory strategies that were not beneficial included Chin tuck cause increased aspiration with nectar thick       STRUCTURAL/FUNCTIONAL ANOMALIES   No structural/functional anomalies were noted    CERVICAL ESOPHAGEAL STAGE :     The cervical esophagus appeared adequate          ___________    Cognition:   Within functional limits for this exam and Alert & Oriented x 3    Oral Peripheral Examination   Adequate lingual/labial strength edentulous     Current Respiratory Status   3 liters nasal cannula     Parameters of Speech Production  Respiration:  Adequate for speech production  Quality:   Breathy--evaluated by ENT 10/5/2022  Findings: from ENT note   Normal nasopharynx, normal epiglottis, normal tongue base, normal pyriform, normal TVC motion and mucosa, no subglottic masses or lesions. Left cord sluggish with mild arytenoid hooding, cords adduct, no masses or lesions, airway patent  Intensity:   Quiet    Pain: No pain reported. EDUCATION:   The Speech Language Pathologist (SLP) completed education regarding results of evaluation and that intervention is warranted at this time. Learner: Patient and Family  Education: Reviewed results and recommendations of this evaluation  Evaluation of Education:  Needs further instruction    This plan may be re-evaluated and revised as warranted. Evaluation Time includes thorough review of current medical information, gathering information on past medical history/social history and prior level of function, completion of standardized testing/informal observation of tasks, assessment of data and education on plan of care and goals. [x]The admitting diagnosis and active problem list, have been reviewed prior to initiation of this evaluation.     CPT Code: 64308  dysphagia study    ACTIVE PROBLEM LIST:   Patient Active Problem List   Diagnosis    Depression with anxiety    Osteoporosis    Asthma    Hyperlipidemia    Mitral regurgitation    Obstructive sleep apnea syndrome    Psoriasis    Diabetes mellitus ([address]    Parkinson's disease ([facility] [address]    Primary hypertension    Microalbuminuria    Morbid obesity (HCC)    RLS (restless legs syndrome)    Generalized weakness    Inability to walk    Hypothyroidism    Chest pain    Acute asthma exacerbation    Asthma exacerbation, mild    Paroxysmal atrial fibrillation (HCC)    Atrial fibrillation with RVR (HCC)    Acute on chronic congestive heart failure (HCC)    Coronary artery disease involving native coronary artery of native heart without angina pectoris    Dysphagia    Hepatosplenomegaly    Acute decompensated heart failure (HCC)    Acute diastolic (congestive) heart failure (HCC)    Nonrheumatic tricuspid valve regurgitation    Tobacco abuse    Recurrent syncope    Septic shock ([address]    Seizure-like activity ([address]    SHANTANU (acute kidney injury) ([address]    Pancytopenia (HCC)    Thrombocytopenia (HCC)    Encephalopathy    Acute respiratory failure with hypoxia (HCC)    Lactic acidosis    Delirium    Acute on chronic anemia    Pleural effusion, right    Acute respiratory failure with hypoxia and hypercapnia (HCC)    Acute confusion    Chronic diastolic (congestive) heart failure (HCC)    Macrocytosis    Other specified anemias    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[address]    Puerperal sepsis with acute hypoxic respiratory failure without septic shock (HCC)    Pulmonary HTN (HCC)    Chronic anticoagulation    RVF (right ventricular failure) (HCC)    Metabolic alkalosis    Sepsis (HCC)    COPD exacerbation (HCC)    Acute on chronic respiratory failure with hypercapnia ([address]    Persistent atrial fibrillation ([address]         Patient seen for swallow therapy 10 minutes. Reviewed current solid/liquid consistency diet recommendation for   Soft and bite size consistency solids (IDDSI level 6) with nectar consistency (mildly thick - IDDSI level 2) liquidsand discussed compensatory strategies to ensure safe PO intake. Reviewed aspiration precautions. Discussed use of Head TURN to left to decrease risk of aspiration with implementation of strengthening exercises to improve swallow function as the long term goal.  Patient was able to restate compensatory strategies during session. Patient requested SLP contact daughter Ana Mccoy to explain results and recommendations as she was not confident she would be able to recall details and explain them. SLP did contact Lilliana and discuss MBSS results and recommendations.   will continue POC.        [id number]  dysphagia tx    Fady Arellano MSCCC/SLP  Speech Language Pathologist  [phone]

## 2022-10-18 NOTE — PROGRESS NOTES
Patient arrived ambulatory to infusion area. C4D8 Taxol completed. Patient tolerated well. Port deaccessed. Patient aware of next infusion appt on 2/11. Patient instructed to call provider with temperature of 100.4 or greater or nausea/vomiting/ diarrhea or pain not controlled by medications. Discharged to home ambulatory. Patient came down to Special Procedures for ultrasound guided right thoracentesis. Procedure was explained, questions were answered. 1105 Starting procedure /75  94  24  85% on 4LPM by nasal canula, O2 titrated to 6LPM    1106  94  22  96% on 6LPM by nasal canula    1112 Ending procedure /61  80  20  100% on 6LPM by nasal canula, O2 titrated back to 4LPM    1114 Patient O2 sat 100% on 4LPM by nasal canula    520 cc of hazy straw color pleural fluid drained from patient, petrolatum dressing 2 x 2 and tegaderm applied to right  back. Patient tolerated procedure    Post procedure chest xray taken    Specimen sent to lab. Nurse to nurse called spoke with Claudia Chairez, nurse notified of above information    Patient transported back to floor.

## 2022-10-18 NOTE — PROGRESS NOTES
Progress Note  Date:10/18/2022       Room:0515/0515-02  Patient Name:Janneth Herndon     YOB: 1950     Age:71 y.o. Subjective    Subjective:  Symptoms:  Improved. She reports shortness of breath. (Had thoracentesis today, doing well). Diet:  Adequate intake. Activity level: Impaired due to weakness. Pain:  She reports no pain. Review of Systems   Constitutional:  Negative for fever. Respiratory:  Positive for shortness of breath. All other systems reviewed and are negative. Objective         Vitals Last 24 Hours:  TEMPERATURE:  Temp  Av.9 °F (36.6 °C)  Min: 97.3 °F (36.3 °C)  Max: 98.3 °F (36.8 °C)  RESPIRATIONS RANGE: Resp  Av.2  Min: 16  Max: 20  PULSE OXIMETRY RANGE: SpO2  Av.3 %  Min: 95 %  Max: 100 %  PULSE RANGE: Pulse  Av.3  Min: 73  Max: 98  BLOOD PRESSURE RANGE: Systolic (07MCY), CDK:547 , Min:108 , BLI:614   ; Diastolic (51BUJ), TUX:79, Min:59, Max:95    I/O (24Hr): Intake/Output Summary (Last 24 hours) at 10/18/2022 1312  Last data filed at 10/17/2022 1814  Gross per 24 hour   Intake 180 ml   Output 150 ml   Net 30 ml     Objective:  General Appearance:  Ill-appearing. Vital signs: (most recent): Blood pressure 130/82, pulse 88, temperature 97.7 °F (36.5 °C), temperature source Oral, resp. rate 20, height 5' (1.524 m), weight 219 lb 9.6 oz (99.6 kg), SpO2 98 %, not currently breastfeeding. Vital signs are normal.  No fever. Output: Producing urine and producing stool. HEENT: Normal HEENT exam.    Lungs:  Normal effort and normal respiratory rate. Heart: Normal rate. Regular rhythm. Extremities: Decreased range of motion. There is dependent edema. Neurological: Patient is alert. Skin:  Warm and dry.     Labs/Imaging/Diagnostics    Labs:  CBC:  Recent Labs     10/16/22  0920   WBC 7.1   RBC 3.65   HGB 9.9*   HCT 33.8*   MCV 92.6   RDW 15.8*        CHEMISTRIES:  Recent Labs     10/16/22  0920      K 4.3 CL 93*   CO2 39*   BUN 29*   CREATININE 0.9   GLUCOSE 230*     PT/INR:  Recent Labs     10/18/22  0916   PROTIME 14.7*   INR 1.3     APTT:No results for input(s): APTT in the last 72 hours. LIVER PROFILE:No results for input(s): AST, ALT, BILIDIR, BILITOT, ALKPHOS in the last 72 hours. Imaging Last 24 Hours:  XR CHEST PORTABLE    Result Date: 10/17/2022  EXAMINATION: ONE XRAY VIEW OF THE CHEST 10/17/2022 8:01 am COMPARISON: 10/14/2022 HISTORY: ORDERING SYSTEM PROVIDED HISTORY: SOB TECHNOLOGIST PROVIDED HISTORY: Reason for exam:->SOB FINDINGS: Significant right lower lobe infiltrate and effusion are mildly worse. Heart size mildly enlarged. Left lung is clear. Worsening right lower lobe infiltrate and effusion     XR CHEST 1 VIEW    Result Date: 10/18/2022  EXAMINATION: ONE XRAY VIEW OF THE CHEST 10/18/2022 11:30 am COMPARISON: 10/17/2022 HISTORY: ORDERING SYSTEM PROVIDED HISTORY: Postprocedural pneumothorax TECHNOLOGIST PROVIDED HISTORY: Reason for exam:->Post Right Thoracentesis FINDINGS: The patient is status post right thoracentesis. There is no right pneumothorax status post right thoracentesis. There is chronic elevation right hemidiaphragm. There is minimal residual blunting of the right costophrenic angle. The left lung is clear. 1. There is no pneumothorax status post right thoracentesis. Assessment//Plan           Hospital Problems             Last Modified POA    * (Principal) COPD exacerbation ([address] 10/12/2022 Yes    Acute on chronic respiratory failure with hypercapnia (HCC) 10/13/2022 Yes    Persistent atrial fibrillation ([address] 10/13/2022 Yes     Assessment:    Condition: In stable condition. Improving. (Doing well, decreased SOB, feeling better). Plan:   Wean off oxygen. Consults: pulmonology. (Continue diuresis  Continue aerosols  Plan for discharge soon).      Electronically signed by Triston Lam DO on 10/18/22 at 1:12 PM EDT

## 2022-10-18 NOTE — PROGRESS NOTES
Speech Language Pathology      NAME:  Narendra Khan  :  1950  DATE: 10/17/2022  ROOM:  0515/0515-02    Patient seen for dysphagia therapy 15 minutes. Education completed trial of solids tolerated well with slow but functional mastication patient would like solids to be more whole and reports she always cuts them up into smaller pieces since she does not have dentures at this time. Recommend diet be advanced to soft and bite size and continue on nectar thick.   Will continue POC    COPD exacerbation ([facility] [J44.1]  Acute on chronic respiratory failure with hypercapnia ([facility]) [J96.22]  Acute on chronic congestive heart failure, unspecified heart failure type [facility]) [I50.9]    52758  dysphagia tx    Lesli Knox MSCCC/SLP  Speech Language Pathologist  [id number]

## 2022-10-19 LAB
GRAM STAIN ORDERABLE: NORMAL
METER GLUCOSE: 199 MG/DL (ref 74–99)
METER GLUCOSE: 258 MG/DL (ref 74–99)
METER GLUCOSE: 401 MG/DL (ref 74–99)
METER GLUCOSE: 87 MG/DL (ref 74–99)

## 2022-10-19 PROCEDURE — 97530 THERAPEUTIC ACTIVITIES: CPT

## 2022-10-19 PROCEDURE — 2060000000 HC ICU INTERMEDIATE R&B

## 2022-10-19 PROCEDURE — 94640 AIRWAY INHALATION TREATMENT: CPT

## 2022-10-19 PROCEDURE — 6370000000 HC RX 637 (ALT 250 FOR IP): Performed by: INTERNAL MEDICINE

## 2022-10-19 PROCEDURE — 2700000000 HC OXYGEN THERAPY PER DAY

## 2022-10-19 PROCEDURE — 94660 CPAP INITIATION&MGMT: CPT

## 2022-10-19 PROCEDURE — 6370000000 HC RX 637 (ALT 250 FOR IP): Performed by: FAMILY MEDICINE

## 2022-10-19 PROCEDURE — 2580000003 HC RX 258: Performed by: FAMILY MEDICINE

## 2022-10-19 PROCEDURE — 97116 GAIT TRAINING THERAPY: CPT

## 2022-10-19 PROCEDURE — 6370000000 HC RX 637 (ALT 250 FOR IP): Performed by: CLINICAL NURSE SPECIALIST

## 2022-10-19 PROCEDURE — 82962 GLUCOSE BLOOD TEST: CPT

## 2022-10-19 PROCEDURE — 97535 SELF CARE MNGMENT TRAINING: CPT

## 2022-10-19 PROCEDURE — 6370000000 HC RX 637 (ALT 250 FOR IP): Performed by: NURSE PRACTITIONER

## 2022-10-19 PROCEDURE — 92526 ORAL FUNCTION THERAPY: CPT | Performed by: SPEECH-LANGUAGE PATHOLOGIST

## 2022-10-19 RX ORDER — PREDNISONE 1 MG/1
15 TABLET ORAL DAILY
Qty: 30 TABLET | Refills: 0 | Status: SHIPPED | OUTPATIENT
Start: 2022-10-20 | End: 2022-10-30

## 2022-10-19 RX ADMIN — METOPROLOL SUCCINATE 100 MG: 50 TABLET, EXTENDED RELEASE ORAL at 21:49

## 2022-10-19 RX ADMIN — ROPINIROLE HYDROCHLORIDE 1 MG: 1 TABLET, FILM COATED ORAL at 08:37

## 2022-10-19 RX ADMIN — Medication 10 ML: at 21:50

## 2022-10-19 RX ADMIN — CARBIDOPA AND LEVODOPA 2 TABLET: 25; 100 TABLET, EXTENDED RELEASE ORAL at 14:32

## 2022-10-19 RX ADMIN — APIXABAN 5 MG: 5 TABLET, FILM COATED ORAL at 21:49

## 2022-10-19 RX ADMIN — Medication 10 ML: at 09:00

## 2022-10-19 RX ADMIN — INSULIN LISPRO 8 UNITS: 100 INJECTION, SOLUTION INTRAVENOUS; SUBCUTANEOUS at 12:26

## 2022-10-19 RX ADMIN — SUCRALFATE 1 G: 1 TABLET ORAL at 21:50

## 2022-10-19 RX ADMIN — CARBIDOPA AND LEVODOPA 2 TABLET: 25; 100 TABLET, EXTENDED RELEASE ORAL at 21:51

## 2022-10-19 RX ADMIN — SUCRALFATE 1 G: 1 TABLET ORAL at 08:37

## 2022-10-19 RX ADMIN — PREDNISONE 15 MG: 5 TABLET ORAL at 08:37

## 2022-10-19 RX ADMIN — APIXABAN 5 MG: 5 TABLET, FILM COATED ORAL at 08:37

## 2022-10-19 RX ADMIN — INSULIN GLARGINE 13 UNITS: 100 INJECTION, SOLUTION SUBCUTANEOUS at 21:49

## 2022-10-19 RX ADMIN — INSULIN GLARGINE 13 UNITS: 100 INJECTION, SOLUTION SUBCUTANEOUS at 08:37

## 2022-10-19 RX ADMIN — FUROSEMIDE 40 MG: 40 TABLET ORAL at 08:37

## 2022-10-19 RX ADMIN — PANTOPRAZOLE SODIUM 40 MG: 40 TABLET, DELAYED RELEASE ORAL at 08:37

## 2022-10-19 RX ADMIN — SUCRALFATE 1 G: 1 TABLET ORAL at 12:26

## 2022-10-19 RX ADMIN — IPRATROPIUM BROMIDE AND ALBUTEROL SULFATE 1 AMPULE: .5; 2.5 SOLUTION RESPIRATORY (INHALATION) at 10:01

## 2022-10-19 RX ADMIN — IPRATROPIUM BROMIDE AND ALBUTEROL SULFATE 1 AMPULE: .5; 2.5 SOLUTION RESPIRATORY (INHALATION) at 18:26

## 2022-10-19 RX ADMIN — IPRATROPIUM BROMIDE AND ALBUTEROL SULFATE 1 AMPULE: .5; 2.5 SOLUTION RESPIRATORY (INHALATION) at 15:12

## 2022-10-19 RX ADMIN — SUCRALFATE 1 G: 1 TABLET ORAL at 17:04

## 2022-10-19 RX ADMIN — IPRATROPIUM BROMIDE AND ALBUTEROL SULFATE 1 AMPULE: .5; 2.5 SOLUTION RESPIRATORY (INHALATION) at 06:13

## 2022-10-19 RX ADMIN — ROPINIROLE HYDROCHLORIDE 1 MG: 1 TABLET, FILM COATED ORAL at 21:50

## 2022-10-19 RX ADMIN — METOPROLOL SUCCINATE 100 MG: 50 TABLET, EXTENDED RELEASE ORAL at 08:37

## 2022-10-19 RX ADMIN — CARBIDOPA AND LEVODOPA 2 TABLET: 25; 100 TABLET, EXTENDED RELEASE ORAL at 08:36

## 2022-10-19 RX ADMIN — ATORVASTATIN CALCIUM 40 MG: 40 TABLET, FILM COATED ORAL at 21:49

## 2022-10-19 RX ADMIN — INSULIN LISPRO 16 UNITS: 100 INJECTION, SOLUTION INTRAVENOUS; SUBCUTANEOUS at 17:18

## 2022-10-19 RX ADMIN — MIRTAZAPINE 15 MG: 15 TABLET, ORALLY DISINTEGRATING ORAL at 21:50

## 2022-10-19 RX ADMIN — MONTELUKAST 10 MG: 10 TABLET, FILM COATED ORAL at 21:50

## 2022-10-19 RX ADMIN — ROPINIROLE HYDROCHLORIDE 1 MG: 1 TABLET, FILM COATED ORAL at 14:32

## 2022-10-19 ASSESSMENT — PAIN SCALES - GENERAL: PAINLEVEL_OUTOF10: 0

## 2022-10-19 NOTE — CARE COORDINATION
10/19/22 1219 CM note: COVID (-) 10/12/22. 3L nc. Discharge order noted. Called pts daughter Neil Welch and informed her pt has a dc order. Neil Welch states she still hopes to get her mom back to [place]. I explained to her that since her mom has a dc order she needs to get issues resolved with the [facility] or take her mom home because as of now the [facility] can not accept pt back because she hasn't resolved the payment issues. Neil Welch states pt no longer has an apartment to return to they got rid of that and pt can not move in with her as she has 2 kids. Neil Welch states she's getting ready to go into a doctor's appt and will try to call [facility] when she gets out. She states I can call her brother Amee Acosta to assist as he is the POA, he just lets her handle everything because he is always working/out of town. Called Amee Acosta and updated him on above. Amee Acosta confirms that he is POA but works a lot and agrees with whatever decisions Neil Welch makes for their mom. Amee Acosta states he will \"take care of things\" with the nursing home. He is going to call [facility] himself and see what he can arrange. Updated Karlee on above. [facility] plans to call Amee Acosta back herself, get him up to speed on everything, and then call me back with the results of their conversation. CM will follow. Electronically signed by Solo Cary RN on 10/19/2022 at 2:44 PM    Update: 10/19/22 5527 Per Amee Acosta, pts son, he spoke with [facility], [facility] liaison, and they do not have a bed available at this time. Amee Acosta is requesting SNF lists for [facility] and [facility] which I emailed to him. Amee Acosta is aware I will need his SNF choices in the morning so I can work on finding another facility to accept his mom since she is stable for discharge. Discharge order cancelled for today and RN Karin Peers updated.  Electronically signed by Solo Cary RN on 10/19/2022 at 4:57 PM

## 2022-10-19 NOTE — PROGRESS NOTES
Speech Language Pathology      NAME:  Donell Paredes  :  1950  DATE: 10/19/2022  ROOM:  [facility]/05-    Patient seen for dysphagia therapy 10 minutes. Patient needed cues to recall left head turn strategy when drinking liquids. She reports she is doing well with the soft and bite size and likes this consistency. More agreeable to nectar thick today discussed need to continue with this and make sure that she is able to consistently implement then may be able to use left head turn with resendiz water protocol implementation.   Will continue POC    COPD exacerbation ([address] [J44.1]  Acute on chronic respiratory failure with hypercapnia (HCC) [J96.22]  Acute on chronic congestive heart failure, unspecified heart failure type [facility]) [I50.9]    83649  dysphagia tx    Norbert Ag MSCCC/SLP  Speech Language Pathologist  [id number]

## 2022-10-19 NOTE — PROGRESS NOTES
Physical Therapy Treatment Note/Plan of Care    Room #:  8326/8856-72  Patient Name: Aminta Porter  YOB: 1950  MRN: [id number]    Date of Service: 10/19/2022     Tentative placement recommendation: [facility] with Physical Therapy   Equipment recommendation: Equipment at Nursing Home      Evaluating Physical Therapist: Lydia Almonte, [address]       Specific Provider Orders/Date/Referring Provider :  10/12/22 2130    PT evaluation and treat  Start:  10/12/22 2130,   End:  10/12/22 2130,   ONE TIME,   Standing Count:  1 Occurrences,   R         Tamia Masters, DO     Admitting Diagnosis:   COPD exacerbation ([address] [J44.1]  Acute on chronic respiratory failure with hypercapnia ([address] [J96.22]  Acute on chronic congestive heart failure, unspecified heart failure type ([address] [I50.9]     shortness of breath  Surgery: none  Visit Diagnoses         Codes    Acute on chronic respiratory failure with hypercapnia ([address]    -  Primary J96.22    Postprocedural pneumothorax     J95.811            Patient Active Problem List   Diagnosis    Depression with anxiety    Osteoporosis    Asthma    Hyperlipidemia    Mitral regurgitation    Obstructive sleep apnea syndrome    Psoriasis    Diabetes mellitus ([address]    Parkinson's disease ([address]    Primary hypertension    Microalbuminuria    Morbid obesity ([address]    RLS (restless legs syndrome)    Generalized weakness    Inability to walk    Hypothyroidism    Chest pain    Acute asthma exacerbation    Asthma exacerbation, mild    Paroxysmal atrial fibrillation (HCC)    Atrial fibrillation with RVR ([address]    Acute on chronic congestive heart failure ([address]    Coronary artery disease involving native coronary artery of native heart without angina pectoris    Dysphagia    Hepatosplenomegaly    Acute decompensated heart failure (HCC)    Acute diastolic (congestive) heart failure (HCC)    Nonrheumatic tricuspid valve regurgitation    Tobacco abuse    Recurrent syncope Septic shock ([facility]    Seizure-like activity (HCC)    SHANTANU (acute kidney injury) ([facility] [facility]    Pancytopenia (HCC)    Thrombocytopenia (HCC)    Encephalopathy    Acute respiratory failure with hypoxia (HCC)    Lactic acidosis    Delirium    Acute on chronic anemia    Pleural effusion, right    Acute respiratory failure with hypoxia and hypercapnia (HCC)    Acute confusion    Chronic diastolic (congestive) heart failure (HCC)    Macrocytosis    Other specified anemias    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[facility]    Puerperal sepsis with acute hypoxic respiratory failure without septic shock (HCC)    Pulmonary HTN (HCC)    Chronic anticoagulation    RVF (right ventricular failure) (HCC)    Metabolic alkalosis    Sepsis (HCC)    COPD exacerbation (HCC)    Acute on chronic respiratory failure with hypercapnia (HCC)    Persistent atrial fibrillation (HCC)        ASSESSMENT of Current Deficits Patient exhibits decreased strength, balance, and endurance impairing functional mobility, transfers, gait , gait distance, and tolerance to activity Patient tolerates inc in ambulation distance/tolerance today. Patient needed standing rest with one ambulation, however improved to not needing rest. Patient with no loss of balance throughout and no major shortness of breath. Patient requires continued skilled physical therapy to address concerns listed above for increased safety and function at discharge.          PHYSICAL THERAPY  PLAN OF CARE       Physical therapy plan of care is established based on physician order,  patient diagnosis and clinical assessment    Current Treatment Recommendations:    -Bed Mobility: Lower extremity exercises   -Sitting Balance: Incorporate reaching activities to activate trunk muscles   -Standing Balance: Perform strengthening exercises in standing to promote motor control with or without upper extremity support   -Transfers: Provide instruction on proper hand and foot position for adequate transfer of weight onto lower extremities and use of gait device if needed and Cues for hand placement, technique and safety. Provide stabilization to prevent fall   -Gait: Gait training, Standing activities to improve: base of support, weight shift, weight bearing , and Pregait training to emphasize: Sequencing , Device control, Upright, and Safety   -Endurance: Utilize Supervised activities to increase level of endurance to allow for safe functional mobility including transfers and gait     PT long term treatment goals are located in below grid    Patient and or family understand(s) diagnosis, prognosis, and plan of care. Frequency of treatments: Patient will be seen  daily.          Prior Level of Function: Patient ambulated with wheeled walker   short distance, transfers to wheelchair  Rehab Potential: good   for baseline    Past medical history:   Past Medical History:   Diagnosis Date    A-fib ([address]     Acute on chronic congestive heart failure (HCC)     Anxiety     Asthma     CAD (coronary artery disease) 01/21/2016    Cancer ([address]  breast ca 2006    right lumpectomy    Chronic kidney disease     nephrolithiasis    COPD exacerbation ([address] 10/12/2022    Depression     Diabetes mellitus ([address]     H/O mammogram     Hx MRSA infection     toe infection january 2012    Hyperlipidemia     Hypertension     Lateral epicondylitis     Morbid obesity ([address]     SERENA on CPAP     Oxygen dependent     Parkinson's disease ([address]     Tubal ligation status      Past Surgical History:   Procedure Laterality Date    BREAST LUMPECTOMY      BREAST REDUCTION SURGERY      CARDIAC CATHETERIZATION  4/28/2014    Dr. Stella Medeiros  01/11/2022    Dr. Christos Ryan  7/29/15    CT GUIDED CHEST TUBE  7/8/2022    CT GUIDED CHEST TUBE 7/8/2022 Carmine Jo MD [facility]    ENDOSCOPY, COLON, DIAGNOSTIC  7/19/15    GALLBLADDER SURGERY      LUMBAR LAMINECTOMY      TOE AMPUTATION      TONSILLECTOMY      UPPER GASTROINTESTINAL ENDOSCOPY      UPPER GASTROINTESTINAL ENDOSCOPY N/A 7/19/2022    EGD ESOPHAGOGASTRODUODENOSCOPY performed by Fox Kelly MD at [address]:    Precautions: Up as tolerated, falls ,   O2, Parkinson's disease    Imaging results: XR CHEST (2 VW)    Result Date: 10/12/2022  EXAMINATION: TWO XRAY VIEWS OF THE CHEST 10/12/2022 5:19 pm      Cardiomegaly with persistent interstitial and alveolar pulmonary edema which is most pronounced on the right. CT HEAD WO CONTRAST    Result Date: 10/9/2022  EXAMINATION: CT OF THE HEAD WITHOUT CONTRAST  10/9/2022 12:47 pm      No acute intracranial abnormality. CT ABDOMEN PELVIS W IV CONTRAST Additional Contrast? None    Result Date: 10/6/2022  EXAMINATION: CT OF THE ABDOMEN AND PELVIS WITH CONTRAST 10/6/2022 11:33 am    Subtle suggestion of hepatic steatosis and cirrhosis including portal venous hypertension with splenomegaly. Small volume abdominal ascites and mild anasarca. Bilateral lower lobe infiltrates and pleural effusions, right more than left. Pneumonia is suspected. Stable benign left adrenal adenoma. No imaging follow-up is required. Chronic left renal scarring probably due to remote infection. Small collection of gas within the endometrial cavity presumably iatrogenically introduced. No definite uterine pathology is appreciated. No focal uterine fluid collections. The uterus could be further evaluated sonographically if clinically indicated. Stool-filled colon particularly on the right suggesting constipation. XR CHEST PORTABLE    Result Date: 10/8/2022  EXAMINATION: ONE XRAY VIEW OF THE CHEST 10/8/2022 7:04 am      Redemonstration of interstitial pulmonary edema or pneumonia bilaterally appearing to have increased in right lung base.     Coker Snellen is most likely a confluence of pulmonary vessels.    US DUP UPPER EXTREMITY LEFT VENOUS    Result Date: 10/8/2022  EXAMINATION: VENOUS ULTRASOUND OF THE LEFT UPPER EXTREMITY, 10/8/2022 6:54 pm      No evidence of DVT. RECOMMENDATIONS: Unavailable     44e  Social history: Patient lives in a skilled nursing facility [address] owned: [employer], [address], and [employer],       [facility]   How much difficulty turning over in bed?: A Little  How much difficulty sitting down on / standing up from a chair with arms?: A Little  How much difficulty moving from lying on back to sitting on side of bed?: A Little  How much help from another person moving to and from a bed to a chair?: A Little  How much help from another person needed to walk in hospital room?: A Little  How much help from another person for climbing 3-5 steps with a railing?: Total  AM-PAC Inpatient Mobility Raw Score : 16  AM-PAC Inpatient T-Scale Score : 40.78  Mobility Inpatient CMS 0-100% Score: 54.16  Mobility Inpatient CMS G-Code Modifier : CK    Nursing cleared patient for PT treatment. Patient sitting in bedside chair at start of session. OBJECTIVE;   Initial Evaluation  Date: 10/13/2022 Treatment Date:    10/19/2022   Short Term/ Long Term   Goals   Was pt agreeable to Eval/treatment? Yes yes To be met in 3 days   Pain level   0/10    0/10    Bed Mobility    Rolling: Not assessed     Supine to sit: Minimal assist of 1    Sit to supine: Not assessed     Scooting: Minimal assist of 1   Rolling: Not assessed patient in chair    Rolling: Independent    Supine to sit:  Independent    Sit to supine: Independent    Scooting: Independent     Transfers Sit to stand: Minimal assist of 1 Cues for hand placement and safety  Sit to stand: Supervision x multiple reps    Sit to stand: Supervision      Ambulation     5 feet using  wheeled walker with Minimal assist of 1   for walker control and cues for sequencing, upright posture, and safety 2x8 feet, 2x12 feet, 30 feet using  wheeled walker with Supervision   with  follow behind for safety due to sudden onset of fatigue in past, however not needed this time    20 feet using  wheeled walker with Supervision     Stair negotiation: ascended and descended   Not assessed          ROM Within functional limits     Increase range of motion 10% of affected joints    Strength BUE:  refer to OT eval  RLE:  3+ 4-/5  LLE:  3+ 4-/5  Increase strength in affected mm groups by 1/3 grade   Balance Sitting EOB:  good -  Dynamic Standing:  fair wheeled walker  Sitting EOB: good   Dynamic Standing: fair+ with wheeled walker    Sitting EOB:  good    Dynamic Standing: good -wheeled walker      Patient is Alert & Oriented x person, place, time, and situation and follows directions    Sensation:  Patient  reports numbness/tingling bilateral toes     Edema:  yes bilateral lower extremities   Endurance: fair       Vitals:  4 liters high flow nasal cannula   Blood Pressure at rest  Blood Pressure during session    Heart Rate at rest  Heart Rate during session    SPO2 at rest %  SPO2 during session %     Patient education  Patient educated on role of Physical Therapy, risks of immobility, safety and plan of care, energy conservation,  importance of mobility while in hospital , safety , and seated exercises      Patient response to education:   Pt verbalized understanding Pt demonstrated skill Pt requires further education in this area   Yes Partial Yes      Treatment:  Patient practiced and was instructed/facilitated in the following treatment: Patient stood to amb in room and back to chair for meds and rest. Patient stood to amb further in room with chair follow and back to bedside for rest. Patient stood to amb to doorway and back to bedside chair with WC follow, however not needed. Patient performed seated exercises in chair. Therapeutic Exercises:  ankle pumps, long arc quad, and seated marching  x 20 reps.        At end of session, patient in chair with     call light and phone within reach,  all lines and tubes intact, nursing notified. Patient would benefit from continued skilled Physical Therapy to improve functional independence and quality of life.          Patient's/ family goals   Return to [facility]    Time in  226  Time out  305    Total Treatment Time  39 minutes      CPT codes:  Therapeutic activities (30038)   23 minutes  2 unit(s)  Gait Training (34032) 16 minutes 1 unit(s)    Richard Canales PTA   #[id number]

## 2022-10-19 NOTE — PROGRESS NOTES
OT BEDSIDE TREATMENT NOTE      Date:10/19/2022  Patient Name: Azra John  MRN: [id number]  : 1950  Room: [address] [place] [address]       Evaluating OT: Benson Pendleton OTR/L #[id number]      Referring Provider and Specific Provider Orders/Date:      10/12/22 2130   OT eval and treat  Start:  10/12/22 2130,   End:  10/12/22 2130,   ONE TIME,   Standing Count:  1 Occurrences,   R         Prerna Jalloh,        Placement Recommendation: Subacute Rehab         Diagnosis:   1. Acute on chronic respiratory failure with hypercapnia (HCC)    2. COPD exacerbation ([facility]    3.  Acute on chronic congestive heart failure, unspecified heart failure type [facility])         Surgery: None       Pertinent Medical History:       Past Medical History        Past Medical History:   Diagnosis Date    A-fib ([facility]      Acute on chronic congestive heart failure (HCC)      Anxiety      Asthma      CAD (coronary artery disease) 2016    Cancer ([facility] [address] [facility]  breast ca 2006     right lumpectomy    Chronic kidney disease       nephrolithiasis    COPD exacerbation ([address] 10/12/2022    Depression      Diabetes mellitus ([facility] [address] [facility]      H/O mammogram      Hx MRSA infection       toe infection 2012    Hyperlipidemia      Hypertension      Lateral epicondylitis      Morbid obesity (HCC)      SERENA on CPAP      Oxygen dependent      Parkinson's disease ([address]      Tubal ligation status              Past Surgical History         Past Surgical History:   Procedure Laterality Date    BREAST LUMPECTOMY        BREAST REDUCTION SURGERY        CARDIAC CATHETERIZATION   2014     Dr. Aakash Valle   2022     Dr. Juan Luis Harp   7/29/15    CT GUIDED CHEST TUBE   2022     CT GUIDED CHEST TUBE 2022 Suresh Davies MD [facility]    ENDOSCOPY, COLON, DIAGNOSTIC   7/19/15    GALLBLADDER SURGERY        LUMBAR LAMINECTOMY        TOE AMPUTATION        TONSILLECTOMY        UPPER GASTROINTESTINAL ENDOSCOPY UPPER GASTROINTESTINAL ENDOSCOPY N/A 7/19/2022     EGD ESOPHAGOGASTRODUODENOSCOPY [facility]          Precautions:  Up as tolerated, falls , O2, Parkinson's disease      Assessment of current deficits    [x] Functional mobility            [x]ADLs           [x] Strength                   []Cognition    [x] Functional transfers          [x] IADLs          [x] Safety Awareness   [x]Endurance    [] Fine Coordination              [x] Balance      [] Vision/perception    []Sensation      []Gross Motor Coordination  [] ROM           [] Delirium                   [] Motor Control      OT PLAN OF CARE   OT POC based on physician orders, patient diagnosis and results of clinical assessment     Frequency/Duration 1-3 days/wk for 2 weeks PRN      Specific OT Treatment Interventions to include:   * Instruction/training on adapted ADL techniques and AE recommendations to increase functional independence within precautions       * Training on energy conservation strategies, correct breathing pattern and techniques to improve independence/tolerance for self-care routine  * Functional transfer/mobility training/DME recommendations for increased independence, safety, and fall prevention  * Patient/Family education to increase follow through with safety techniques and functional independence  * Recommendation of environmental modifications for increased safety with functional transfers/mobility and ADLs  * Therapeutic exercise to improve motor endurance, ROM, and functional strength for ADLs/functional transfers  * Therapeutic activities to facilitate/challenge dynamic balance, stand tolerance for increased safety and independence with ADLs  * Therapeutic activities to facilitate gross/fine motor skills for increased independence with ADLs  * Positioning to improve skin integrity, interaction with environment and functional independence     Recommended Adaptive Equipment: TBD       Home Living: Patient lives in a skilled nursing facility [address] owned: [employer], [address], and [place]      Prior Level of Function: requires assist with ADLs ; ambulated with wheeled walker short distance, transfers to wheelchair     Enjoys: games on phone       Pain Level: pt denied pain              Cognition: A&O: 4/4; Follows 3 step directions              Memory: good               Sequencing: good               Problem solving: good               Judgement/safety: good      [facility]   AM-PAC Daily Activity Inpatient   How much help for putting on and taking off regular lower body clothing?: A Lot  How much help for Bathing?: A Lot  How much help for Toileting?: Total  How much help for putting on and taking off regular upper body clothing?: A Lot  How much help for taking care of personal grooming?: A Little  How much help for eating meals?: A Little  AM-PAC Inpatient Daily Activity Raw Score: 13                Functional Assessment:     Initial Eval Status  Date: 10/14/22    Treatment Status  Date:10/19/22 STGs = LTGs  Time frame: 10-14 days   Feeding Supervision    N/T  Independent    Grooming Minimal Assist    Pt able to wash face with set up assist; pt declined hair care and oral hygiene; pt states her dentures are not here and declined cleaning mouth Supervision    UB Dressing Moderate Assist    Mod A to change gowns with assist for line management and to tie/untie back of gown Supervision    LB Dressing Dependent    Mod A ; max A to change socks when seated in chair; pt able to doff incontinence brief over B hips and feet when standing; pt able to don brief over B feet with mod A; pt able to don garment over B hips with min A for standing balance  Minimal Assist    Bathing Moderate Assist     Min/mod A; pt able to wash UB when seated in chair; pt able to wash proximal LE's; assist to wash back and distal LE's/feet; pt able to complete pericare and rear hygiene with min A for standing balance  Minimal Assist    Toileting Dependent   For rear hygiene while standing at walker; pt has genao catheter. Pt would benefit from bedside commode. Nursing informed. N/T  Minimal Assist    Bed Mobility  Supine to sit: Minimal Assist   Sit to supine:  Not Assessed     N/T; pt seated in chair at beginning and end of session  Supine to sit: Independent   Sit to supine: Independent    Functional Transfers Sit to stand: Minimal Assist   Stand to sit: Minimal Assist      Transfer training with verbal cues for hand placement throughout session to improve safety. Min A for sit to stand transfers to/from chair x 5 reps for ADL tasks and during transfer training; use of FWW; assist for O2 line management  Independent    Functional Mobility Minimal Assist with wheeled walker to improve balance from EOB to chair, verbal cues for walker sequence and safety. Min A with FWW for household distances to/from bathroom x 3 reps with assist for O2 line management and seated rest breaks Moderate [place] with use of wheeled walker    Balance Sitting:     Static: fair plus    Dynamic: fair unsupported   Standing: fair/fair minus with walker     Sitting:     Static: fair plus    Dynamic: fair unsupported   Standing: fair/fair minus with walker   Sitting:     Static: good    Dynamic: good  Standing: fair plus with walker    Activity Tolerance fair   Fair/fair minus; seated rest breaks d/t fatigue  Increase standing tolerance >3 minutes for improved engagement with functional transfers and indep in ADLs      Visual/  Perceptual Glasses: yes        NA       Hand Dominance: right         Hearing:  WFL   Sensation:   No c/o numbness or tingling  Tone:  WFL   Edema:     Comments: Patient cleared by nursing staff. Upon arrival pt seated in chair. Pt agreeable to OT tx session.   Pt educated with regards to  hand placement, safety awareness, static sitting balance,  standing balance, transfer training, functional mobility, ADL retraining,  grooming tasks, UE/LE bathing, LE/UE dressing, pericare and rear hygiene,  ECT's. At end of session pt seated in chair with positioning provided  with O2 tube intact, call light within reach. Overall, pt demonstrated decreased independence and safety during completion of ADL/functional transfers/mobility tasks. Pt would benefit from continued skilled OT to increase safety and independence with completion of ADL/IADL tasks for functional independence and quality of life. Pt required cues and education as noted above for safe facilitation and completion of tasks. Therapist provided skilled monitoring of patient's response during treatment session. Prior to and at the end of session, environmental modifications/line management completed for patients safety and efficiency of treatment session. Overall, patient demonstrates minimal/moderate difficulties with completion of BADLs and IADLs. Factors contributing to these difficulties include decreased endurance, and generalized weakness. As noted above, patient likely to benefit from further OT intervention to increase independence, safety, and overall quality of life. Treatment:     Functional transfers: Facilitated transfers from various surfaces with cues for body alignment, safety and hand placement. ADL completion: Self-care retraining for the above-mentioned ADLs; training on proper hand placement, safety technique, sequencing, and energy conservation techniques. Postural Balance: Sitting/standing balance retraining to improve righting reactions with postural changes during ADLs. Pt has made fair progress towards set goals   OT 1-3 days/wk for 2 weeks PRN     Treatment Time also includes thorough review of current medical information, gathering information on past medical history/social history and prior level of function, informal observation of tasks, assessment of data and education on plan of care and goals.     Treatment Time In: 9:00 AM    Treatment Time Out: 9:25 AM            Treatment Charges: Mins Units   ADL/Home Mgt     [id number] 15 1   Thera Activities     00802 10 1   Ther Ex                 [id number]       Manual Therapy    14592     Neuro Re-ed         24377     Orthotic manage/training                               [id number]     Non Billable Time     Total Timed Treatment 25 [address] [facility], [facility]/ #[id number]

## 2022-10-19 NOTE — PROGRESS NOTES
Physical Therapy        0515/0515-02    Patient unavailable for physical therapy treatment due to patient requested therapy session later in day due to current fatigue. Will attempt session at later time/date.      Miriam Dumont, PTA  #[id number]

## 2022-10-19 NOTE — PROGRESS NOTES
Progress Note  Date:10/19/2022       Room:0515/0515-02  Patient Name:Janneth Vasquez     YOB: 1950     Age:71 y.o. Subjective    Subjective:  Symptoms:  Stable. She reports shortness of breath. Diet:  Adequate intake. No nausea or vomiting. Activity level: Impaired due to weakness. Pain:  She reports no pain. Review of Systems   Constitutional:  Negative for fever. Respiratory:  Positive for shortness of breath. Gastrointestinal:  Negative for nausea and vomiting. All other systems reviewed and are negative. Objective         Vitals Last 24 Hours:  TEMPERATURE:  Temp  Av.2 °F (36.8 °C)  Min: 97.5 °F (36.4 °C)  Max: 98.6 °F (37 °C)  RESPIRATIONS RANGE: Resp  Av  Min: 15  Max: 20  PULSE OXIMETRY RANGE: SpO2  Av.7 %  Min: 96 %  Max: 100 %  PULSE RANGE: Pulse  Av.6  Min: 76  Max: 88  BLOOD PRESSURE RANGE: Systolic (10RHT), LOC:881 , Min:109 , LFC:113   ; Diastolic (39OBL), WJJ:77, Min:69, Max:82    I/O (24Hr): Intake/Output Summary (Last 24 hours) at 10/19/2022 1144  Last data filed at 10/18/2022 1838  Gross per 24 hour   Intake 240 ml   Output 1450 ml   Net -1210 ml     Objective:  General Appearance:  Ill-appearing. Vital signs: (most recent): Blood pressure (!) 110/59, pulse 91, temperature 98.1 °F (36.7 °C), temperature source Oral, resp. rate 16, height 5' (1.524 m), weight 216 lb 3.2 oz (98.1 kg), SpO2 100 %, not currently breastfeeding. Vital signs are normal.  No fever. Output: Producing urine and producing stool. HEENT: Normal HEENT exam.    Lungs:  Normal effort and normal respiratory rate. There are decreased breath sounds. Heart: Normal rate. Regular rhythm. Extremities: Decreased range of motion. There is dependent edema. Neurological: Patient is alert. Skin:  Warm and dry. Labs/Imaging/Diagnostics    Labs:  CBC:No results for input(s): WBC, RBC, HGB, HCT, MCV, RDW, PLT in the last 72 hours.   CHEMISTRIES:No results for input(s): NA, K, CL, CO2, BUN, CREATININE, GLUCOSE, CA, PHOS, MG in the last 72 hours. PT/INR:  Recent Labs     10/18/22  0916   PROTIME 14.7*   INR 1.3     APTT:No results for input(s): APTT in the last 72 hours. LIVER PROFILE:No results for input(s): AST, ALT, BILIDIR, BILITOT, ALKPHOS in the last 72 hours. Imaging Last 24 Hours:  XR CHEST 1 VIEW    Result Date: 10/18/2022  EXAMINATION: ONE XRAY VIEW OF THE CHEST 10/18/2022 11:30 am COMPARISON: 10/17/2022 HISTORY: ORDERING SYSTEM PROVIDED HISTORY: Postprocedural pneumothorax TECHNOLOGIST PROVIDED HISTORY: Reason for exam:->Post Right Thoracentesis FINDINGS: The patient is status post right thoracentesis. There is no right pneumothorax status post right thoracentesis. There is chronic elevation right hemidiaphragm. There is minimal residual blunting of the right costophrenic angle. The left lung is clear. 1. There is no pneumothorax status post right thoracentesis. IR GUIDED THORACENTESIS PLEURAL    Result Date: 10/18/2022  PROCEDURE: ULTRASOUNDGUIDED RIGHT THORACENTESIS 10/18/2022 HISTORY: ORDERING SYSTEM PROVIDED HISTORY: IR guided thoracentesis TECHNOLOGIST PROVIDED HISTORY: Reason for exam:->IR guided thoracentesis Which side should the procedure be performed?->Radiologist Recommendation TECHNIQUE: Informed consent was obtained after a detailed explanation of the procedure including risks, benefits, and alternatives. Universal protocol was performed. The right chest was prepped and draped in sterile fashion and local anesthesia was achieved with lidocaine. An 8 [place] needle sheath was advanced under ultrasound guidance into pleural effusion and thoracentesis was performed. The patient tolerated the procedure well. FINDINGS: A total of 520 cc cc was removed. Successful ultrasound guided thoracentesis.      Assessment//Plan           Hospital Problems             Last Modified POA    * (Principal) COPD exacerbation ([address] 10/12/2022 Yes    Acute on chronic respiratory failure with hypercapnia ([facility] 10/13/2022 Yes    Persistent atrial fibrillation ([facility] [address] 10/13/2022 Yes     Assessment:    Condition: In stable condition. (Stable, no changes, doing well. Plan for discharge). Plan:   Other transfer (see comment). Per physical therapy. Consults: pulmonology. Regular diet. (Plan for discharge today  Will follow as outpatient at  [place]).      Electronically signed by Marco Segundo DO on 10/19/22 at 11:44 AM EDT

## 2022-10-19 NOTE — PROGRESS NOTES
Pulmonary/Critical Care Progress Note    We are following patient for acute superimposed on chronic hypoxemic and hypercapnic respiratory failure, exacerbation of COPD, question right lower lobe pneumonia, alveolar hypoventilation, CKD, CHF, atrial fibrillation, diabetes mellitus type 2, morbid obesity, obstructive sleep apnea, history of Parkinson's disease      SUBJECTIVE:  Patient continues to improve she denies any complaints denies fever, chills, or rigors. .  She is less short of breath. Heart rate is much better controlled. She underwent thoracentesis today. Fluid is transudative. Currently on 3 L nasal cannula. MEDICATIONS:   predniSONE  15 mg Oral Daily    furosemide  40 mg Oral Daily    atorvastatin  40 mg Oral Nightly    sodium chloride flush  5-40 mL IntraVENous 2 times per day    apixaban  5 mg Oral BID    carbidopa-levodopa  2 tablet Oral TID    insulin glargine  13 Units SubCUTAneous BID    metoprolol succinate  100 mg Oral BID    mirtazapine  15 mg Oral Nightly    montelukast  10 mg Oral Nightly    pantoprazole  40 mg Oral QAM    rOPINIRole  1 mg Oral TID    sucralfate  1 g Oral 4x Daily    insulin lispro  0-16 Units SubCUTAneous TID WC    insulin lispro  0-4 Units SubCUTAneous Nightly    ipratropium-albuterol  1 ampule Inhalation Q4H      dextrose      sodium chloride       glucose, dextrose bolus **OR** dextrose bolus, glucagon (rDNA), dextrose, sodium chloride flush, sodium chloride, ondansetron **OR** ondansetron, polyethylene glycol, acetaminophen **OR** acetaminophen, docusate sodium, LORazepam, loperamide, promethazine      REVIEW OF SYSTEMS:  Constitutional: Denies fever, weight loss, night sweats, and fatigue  Skin: Denies pigmentation, dark lesions, and rashes   HEENT: Denies hearing loss, tinnitus, ear drainage, epistaxis, sore throat, and hoarseness. Cardiovascular: Denies palpitations, chest pain, and chest pressure.   Respiratory: Denies cough, dyspnea at rest, hemoptysis, apnea, and choking. Gastrointestinal: Denies nausea, vomiting, poor appetite, diarrhea, heartburn or reflux  Genitourinary: Denies dysuria, frequency, urgency or hematuria  Musculoskeletal: Denies myalgias, muscle weakness, and bone pain  Neurological: Denies dizziness, vertigo, headache, and focal weakness  Psychological: Denies anxiety and depression  Endocrine: Denies heat intolerance and cold intolerance  Hematopoietic/Lymphatic: Denies bleeding problems and blood transfusions    OBJECTIVE:  Vitals:    10/19/22 1001   BP:    Pulse:    Resp:    Temp:    SpO2: 96%     FiO2 : 35 %  O2 Flow Rate (L/min): 3 L/min  O2 Device: Nasal cannula    PHYSICAL EXAM:  Constitutional: No fever, chills, diaphoresis  Skin: No skin rash, no skin breakdown  HEENT: Unremarkable  Neck: No JVD, lymphadenopathy, thyromegaly  Cardiovascular: S1, S2 irregular. Grade 1/6 to 2/6 systolic ejection murmur left sternal border  Respiratory: Few crackles right base, minimal end expiratory wheezes bilaterally  Gastrointestinal: Soft, obese, nontender  Genitourinary: No CVA tenderness  Extremities:, No clubbing, cyanosis, or edema  Neurological: Awake, alert, oriented x3. No evidence of focal motor or sensory deficits  Psychological: In good spirits. Appropriate affect. Very pleased with her progress.     LABS:  WBC   Date Value Ref Range Status   10/16/2022 7.1 4.5 - 11.5 E9/L Final   10/14/2022 7.4 4.5 - 11.5 E9/L Final   10/13/2022 5.4 4.5 - 11.5 E9/L Final     Hemoglobin   Date Value Ref Range Status   10/16/2022 9.9 (L) 11.5 - 15.5 g/dL Final   10/14/2022 10.0 (L) 11.5 - 15.5 g/dL Final   10/13/2022 10.0 (L) 11.5 - 15.5 g/dL Final     Hematocrit   Date Value Ref Range Status   10/16/2022 33.8 (L) 34.0 - 48.0 % Final   10/14/2022 34.8 34.0 - 48.0 % Final   10/13/2022 34.3 34.0 - 48.0 % Final     MCV   Date Value Ref Range Status   10/16/2022 92.6 80.0 - 99.9 fL Final   10/14/2022 93.5 80.0 - 99.9 fL Final   10/13/2022 91.0 80.0 - 99.9 fL Final Platelets   Date Value Ref Range Status   10/16/2022 247 130 - 450 E9/L Final   10/14/2022 269 130 - 450 E9/L Final   10/13/2022 233 130 - 450 E9/L Final     Sodium   Date Value Ref Range Status   10/16/2022 139 132 - 146 mmol/L Final   10/14/2022 139 132 - 146 mmol/L Final   10/13/2022 141 132 - 146 mmol/L Final     Potassium   Date Value Ref Range Status   10/16/2022 4.3 3.5 - 5.0 mmol/L Final   10/14/2022 4.8 3.5 - 5.0 mmol/L Final   10/08/2022 4.0 3.5 - 5.0 mmol/L Final     Potassium reflex Magnesium   Date Value Ref Range Status   10/13/2022 4.2 3.5 - 5.0 mmol/L Final   10/12/2022 4.3 3.5 - 5.0 mmol/L Final   10/05/2022 4.2 3.5 - 5.0 mmol/L Final     Chloride   Date Value Ref Range Status   10/16/2022 93 (L) 98 - 107 mmol/L Final   10/14/2022 93 (L) 98 - 107 mmol/L Final   10/13/2022 99 98 - 107 mmol/L Final     CO2   Date Value Ref Range Status   10/16/2022 39 (H) 22 - 29 mmol/L Final   10/14/2022 38 (H) 22 - 29 mmol/L Final   10/13/2022 32 (H) 22 - 29 mmol/L Final     BUN   Date Value Ref Range Status   10/16/2022 29 (H) 6 - 23 mg/dL Final   10/14/2022 27 (H) 6 - 23 mg/dL Final   10/13/2022 19 6 - 23 mg/dL Final     Creatinine   Date Value Ref Range Status   10/16/2022 0.9 0.5 - 1.0 mg/dL Final   10/14/2022 1.1 (H) 0.5 - 1.0 mg/dL Final   10/13/2022 0.9 0.5 - 1.0 mg/dL Final     Glucose   Date Value Ref Range Status   10/16/2022 230 (H) 74 - 99 mg/dL Final   10/14/2022 334 (H) 74 - 99 mg/dL Final   10/13/2022 215 (H) 74 - 99 mg/dL Final     Calcium   Date Value Ref Range Status   10/16/2022 8.9 8.6 - 10.2 mg/dL Final   10/14/2022 8.8 8.6 - 10.2 mg/dL Final   10/13/2022 8.1 (L) 8.6 - 10.2 mg/dL Final     Total Protein   Date Value Ref Range Status   10/14/2022 6.3 (L) 6.4 - 8.3 g/dL Final   10/13/2022 5.6 (L) 6.4 - 8.3 g/dL Final   10/08/2022 4.8 (L) 6.4 - 8.3 g/dL Final     Albumin   Date Value Ref Range Status   10/14/2022 3.5 3.5 - 5.2 g/dL Final   10/13/2022 3.1 (L) 3.5 - 5.2 g/dL Final   10/08/2022 2.5 (L) 3.5 - 5.2 g/dL Final     Total Bilirubin   Date Value Ref Range Status   10/14/2022 0.3 0.0 - 1.2 mg/dL Final   10/13/2022 0.3 0.0 - 1.2 mg/dL Final   10/08/2022 0.4 0.0 - 1.2 mg/dL Final     Alkaline Phosphatase   Date Value Ref Range Status   10/14/2022 98 35 - 104 U/L Final   10/13/2022 87 35 - 104 U/L Final   10/08/2022 74 35 - 104 U/L Final     AST   Date Value Ref Range Status   10/14/2022 7 0 - 31 U/L Final   10/13/2022 12 0 - 31 U/L Final     Comment:     Specimen is slightly Hemolyzed. Result may be artificially increased. 10/08/2022 7 0 - 31 U/L Final     ALT   Date Value Ref Range Status   10/14/2022 <5 0 - 32 U/L Final   10/13/2022 <5 0 - 32 U/L Final   10/08/2022 5 0 - 32 U/L Final     GFR Non-   Date Value Ref Range Status   10/16/2022 >60 >=60 mL/min/1.73 Final     Comment:     Chronic Kidney Disease: less than 60 ml/min/1.73 sq.m. Kidney Failure: less than 15 ml/min/1.73 sq.m. Results valid for patients 18 years and older. 10/14/2022 49 >=60 mL/min/1.73 Final     Comment:     Chronic Kidney Disease: less than 60 ml/min/1.73 sq.m. Kidney Failure: less than 15 ml/min/1.73 sq.m. Results valid for patients 18 years and older. 10/13/2022 >60 >=60 mL/min/1.73 Final     Comment:     Chronic Kidney Disease: less than 60 ml/min/1.73 sq.m. Kidney Failure: less than 15 ml/min/1.73 sq.m. Results valid for patients 18 years and older.        GFR    Date Value Ref Range Status   10/16/2022 >60  Final   10/14/2022 59  Final   10/13/2022 >60  Final     Magnesium   Date Value Ref Range Status   10/14/2022 2.1 1.6 - 2.6 mg/dL Final   10/08/2022 1.9 1.6 - 2.6 mg/dL Final   10/07/2022 1.8 1.6 - 2.6 mg/dL Final     Phosphorus   Date Value Ref Range Status   10/08/2022 3.0 2.5 - 4.5 mg/dL Final   10/07/2022 2.8 2.5 - 4.5 mg/dL Final   10/06/2022 2.6 2.5 - 4.5 mg/dL Final     No results for input(s): PH, PO2, PCO2, HCO3, BE, O2SAT in the last 72 hours.      RADIOLOGY:  XR CHEST 1 VIEW   Final Result   1. There is no pneumothorax status post right thoracentesis. IR GUIDED THORACENTESIS PLEURAL   Final Result   Successful ultrasound guided thoracentesis. XR CHEST PORTABLE   Final Result   Worsening right lower lobe infiltrate and effusion         XR CHEST PORTABLE   Final Result   Right lower lobe opacity possibly representing atelectasis. CT CHEST W CONTRAST   Final Result   1. Interlobular septal thickening in the upper lungs, bilateral pleural   effusions greater on the right, 4 chamber cardiac enlargement, pericardial   effusion and hepatosplenomegaly. Trace right upper quadrant perihepatic   ascites. Findings suggest volume overload/CHF. 2.  Partial passive atelectasis of the lower lobes, right middle lobe and   lingular segment. 3.  Prominent main pulmonary artery, consider pulmonary artery hypertension. No filling defects in the pulmonary artery to suggest embolism. 4.  Postop changes in the right axilla and right breast.         XR CHEST (2 VW)   Final Result   Cardiomegaly with persistent interstitial and alveolar pulmonary edema which   is most pronounced on the right. FL MODIFIED BARIUM SWALLOW W VIDEO    (Results Pending)           PROBLEM LIST:  Principal Problem:    COPD exacerbation ([address]  Active Problems:    Acute on chronic respiratory failure with hypercapnia (HCC)    Persistent atrial fibrillation ([address]  Resolved Problems:    * No resolved hospital problems.  *      IMPRESSION:  Acute, superimposed on chronic hypoxemic and hypercapnic respiratory failure  Morbid obesity  Obstructive sleep apnea  Alveolar hypoventilation syndrome  Diabetes mellitus type 2  HFpEF  Possible right lower lobe pneumonia  Severe pulmonary hypertension with an element of right heart failure  Atrial fibrillation on Eliquis with controlled ventricular response  Chronic kidney disease  Small bilateral pleural effusions (transudative right-sided pleural effusion on October 18, 2022)  Severe COPD with exacerbation  Cor pulmonale    PLAN:  Discontinue antibiotics  BiPAP as needed and nightly  Continue inhaled bronchodilators   Chest x-ray 10/17/22 showed worsening right-sided pleural effusion  PT and OT are essential and will continue  Eliquis was resumed. thoracentesis was completed on October 18, 2022 520 cc removed. Patient had a thoracentesis in August 26, 2022. Wean FiO2 as tolerated and assess the need for home oxygen before discharge  Patient can be discharged from pulmonary point of view.       Electronically signed by Alba Hope MD on 10/19/2022 at 3:54 PM

## 2022-10-20 LAB
METER GLUCOSE: 137 MG/DL (ref 74–99)
METER GLUCOSE: 150 MG/DL (ref 74–99)
METER GLUCOSE: 305 MG/DL (ref 74–99)
METER GLUCOSE: 352 MG/DL (ref 74–99)

## 2022-10-20 PROCEDURE — 94640 AIRWAY INHALATION TREATMENT: CPT

## 2022-10-20 PROCEDURE — 2580000003 HC RX 258: Performed by: FAMILY MEDICINE

## 2022-10-20 PROCEDURE — 6370000000 HC RX 637 (ALT 250 FOR IP): Performed by: CLINICAL NURSE SPECIALIST

## 2022-10-20 PROCEDURE — 97116 GAIT TRAINING THERAPY: CPT

## 2022-10-20 PROCEDURE — 2700000000 HC OXYGEN THERAPY PER DAY

## 2022-10-20 PROCEDURE — 97530 THERAPEUTIC ACTIVITIES: CPT

## 2022-10-20 PROCEDURE — 97110 THERAPEUTIC EXERCISES: CPT

## 2022-10-20 PROCEDURE — 6370000000 HC RX 637 (ALT 250 FOR IP): Performed by: FAMILY MEDICINE

## 2022-10-20 PROCEDURE — 2060000000 HC ICU INTERMEDIATE R&B

## 2022-10-20 PROCEDURE — 6370000000 HC RX 637 (ALT 250 FOR IP): Performed by: INTERNAL MEDICINE

## 2022-10-20 PROCEDURE — 6370000000 HC RX 637 (ALT 250 FOR IP): Performed by: NURSE PRACTITIONER

## 2022-10-20 PROCEDURE — 82962 GLUCOSE BLOOD TEST: CPT

## 2022-10-20 PROCEDURE — 94660 CPAP INITIATION&MGMT: CPT

## 2022-10-20 RX ADMIN — APIXABAN 5 MG: 5 TABLET, FILM COATED ORAL at 19:49

## 2022-10-20 RX ADMIN — INSULIN GLARGINE 13 UNITS: 100 INJECTION, SOLUTION SUBCUTANEOUS at 09:24

## 2022-10-20 RX ADMIN — METOPROLOL SUCCINATE 100 MG: 50 TABLET, EXTENDED RELEASE ORAL at 09:26

## 2022-10-20 RX ADMIN — INSULIN LISPRO 12 UNITS: 100 INJECTION, SOLUTION INTRAVENOUS; SUBCUTANEOUS at 12:47

## 2022-10-20 RX ADMIN — ROPINIROLE HYDROCHLORIDE 1 MG: 1 TABLET, FILM COATED ORAL at 09:27

## 2022-10-20 RX ADMIN — SUCRALFATE 1 G: 1 TABLET ORAL at 19:49

## 2022-10-20 RX ADMIN — INSULIN LISPRO 4 UNITS: 100 INJECTION, SOLUTION INTRAVENOUS; SUBCUTANEOUS at 19:55

## 2022-10-20 RX ADMIN — ATORVASTATIN CALCIUM 40 MG: 40 TABLET, FILM COATED ORAL at 19:49

## 2022-10-20 RX ADMIN — LORAZEPAM 0.5 MG: 0.5 TABLET ORAL at 11:27

## 2022-10-20 RX ADMIN — ROPINIROLE HYDROCHLORIDE 1 MG: 1 TABLET, FILM COATED ORAL at 19:49

## 2022-10-20 RX ADMIN — MONTELUKAST 10 MG: 10 TABLET, FILM COATED ORAL at 19:49

## 2022-10-20 RX ADMIN — IPRATROPIUM BROMIDE AND ALBUTEROL SULFATE 1 AMPULE: .5; 2.5 SOLUTION RESPIRATORY (INHALATION) at 21:54

## 2022-10-20 RX ADMIN — IPRATROPIUM BROMIDE AND ALBUTEROL SULFATE 1 AMPULE: .5; 2.5 SOLUTION RESPIRATORY (INHALATION) at 01:53

## 2022-10-20 RX ADMIN — IPRATROPIUM BROMIDE AND ALBUTEROL SULFATE 1 AMPULE: .5; 2.5 SOLUTION RESPIRATORY (INHALATION) at 06:26

## 2022-10-20 RX ADMIN — CARBIDOPA AND LEVODOPA 2 TABLET: 25; 100 TABLET, EXTENDED RELEASE ORAL at 09:29

## 2022-10-20 RX ADMIN — IPRATROPIUM BROMIDE AND ALBUTEROL SULFATE 1 AMPULE: .5; 2.5 SOLUTION RESPIRATORY (INHALATION) at 14:33

## 2022-10-20 RX ADMIN — SUCRALFATE 1 G: 1 TABLET ORAL at 09:26

## 2022-10-20 RX ADMIN — MIRTAZAPINE 15 MG: 15 TABLET, ORALLY DISINTEGRATING ORAL at 19:49

## 2022-10-20 RX ADMIN — APIXABAN 5 MG: 5 TABLET, FILM COATED ORAL at 09:27

## 2022-10-20 RX ADMIN — IPRATROPIUM BROMIDE AND ALBUTEROL SULFATE 1 AMPULE: .5; 2.5 SOLUTION RESPIRATORY (INHALATION) at 18:19

## 2022-10-20 RX ADMIN — CARBIDOPA AND LEVODOPA 2 TABLET: 25; 100 TABLET, EXTENDED RELEASE ORAL at 16:33

## 2022-10-20 RX ADMIN — SUCRALFATE 1 G: 1 TABLET ORAL at 16:34

## 2022-10-20 RX ADMIN — CARBIDOPA AND LEVODOPA 2 TABLET: 25; 100 TABLET, EXTENDED RELEASE ORAL at 19:58

## 2022-10-20 RX ADMIN — PANTOPRAZOLE SODIUM 40 MG: 40 TABLET, DELAYED RELEASE ORAL at 09:26

## 2022-10-20 RX ADMIN — PREDNISONE 15 MG: 5 TABLET ORAL at 09:25

## 2022-10-20 RX ADMIN — ROPINIROLE HYDROCHLORIDE 1 MG: 1 TABLET, FILM COATED ORAL at 16:34

## 2022-10-20 RX ADMIN — FUROSEMIDE 40 MG: 40 TABLET ORAL at 09:27

## 2022-10-20 RX ADMIN — INSULIN GLARGINE 13 UNITS: 100 INJECTION, SOLUTION SUBCUTANEOUS at 19:50

## 2022-10-20 RX ADMIN — METOPROLOL SUCCINATE 100 MG: 50 TABLET, EXTENDED RELEASE ORAL at 19:49

## 2022-10-20 RX ADMIN — Medication 10 ML: at 09:28

## 2022-10-20 RX ADMIN — Medication 10 ML: at 20:00

## 2022-10-20 ASSESSMENT — ENCOUNTER SYMPTOMS
VOMITING: 0
NAUSEA: 0
SHORTNESS OF BREATH: 1

## 2022-10-20 NOTE — PROGRESS NOTES
Physical Therapy Treatment Note/Plan of Care    Room #:  9131/7065-71  Patient Name: Noé Lawson  YOB: 1950  MRN: [id number]    Date of Service: 10/20/2022     Tentative placement recommendation: [facility] with Physical Therapy   Equipment recommendation: Equipment at Nursing Home      Evaluating Physical Therapist: Steven José      Specific Provider Orders/Date/Referring Provider :  10/12/22 2130    PT evaluation and treat  Start:  10/12/22 2130,   End:  10/12/22 2130,   ONE TIME,   Standing Count:  1 Occurrences,   R         Lennox Zarco DO     Admitting Diagnosis:   COPD exacerbation ([address] [J44.1]  Acute on chronic respiratory failure with hypercapnia ([address] [J96.22]  Acute on chronic congestive heart failure, unspecified heart failure type ([address] [I50.9]     shortness of breath  Surgery: none  Visit Diagnoses         Codes    Acute on chronic respiratory failure with hypercapnia ([address]    -  Primary J96.22    Postprocedural pneumothorax     J95.811            Patient Active Problem List   Diagnosis    Depression with anxiety    Osteoporosis    Asthma    Hyperlipidemia    Mitral regurgitation    Obstructive sleep apnea syndrome    Psoriasis    Diabetes mellitus ([address]    Parkinson's disease ([address]    Primary hypertension    Microalbuminuria    Morbid obesity ([address]    RLS (restless legs syndrome)    Generalized weakness    Inability to walk    Hypothyroidism    Chest pain    Acute asthma exacerbation    Asthma exacerbation, mild    Paroxysmal atrial fibrillation (HCC)    Atrial fibrillation with RVR ([address]    Acute on chronic congestive heart failure ([address]    Coronary artery disease involving native coronary artery of native heart without angina pectoris    Dysphagia    Hepatosplenomegaly    Acute decompensated heart failure (HCC)    Acute diastolic (congestive) heart failure (HCC)    Nonrheumatic tricuspid valve regurgitation    Tobacco abuse    Recurrent syncope Septic shock [facility]    Seizure-like activity (HCC)    SHANTANU (acute kidney injury) ([facility]    Pancytopenia (HCC)    Thrombocytopenia (HCC)    Encephalopathy    Acute respiratory failure with hypoxia (HCC)    Lactic acidosis    Delirium    Acute on chronic anemia    Pleural effusion, right    Acute respiratory failure with hypoxia and hypercapnia (HCC)    Acute confusion    Chronic diastolic (congestive) heart failure (HCC)    Macrocytosis    Other specified anemias    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[facility]    Puerperal sepsis with acute hypoxic respiratory failure without septic shock (HCC)    Pulmonary HTN (HCC)    Chronic anticoagulation    RVF (right ventricular failure) (HCC)    Metabolic alkalosis    Sepsis (HCC)    COPD exacerbation (HCC)    Acute on chronic respiratory failure with hypercapnia (HCC)    Persistent atrial fibrillation (HCC)        ASSESSMENT of Current Deficits Patient exhibits decreased strength, balance, and endurance impairing functional mobility, transfers, gait , gait distance, and tolerance to activity Patient demonstrated an increase in ambulation distance/tolerance today. Patient with no loss of balance throughout and no major shortness of breath. Patient very motivated to increase function and participate in therapy. Patient requires continued skilled physical therapy to address concerns listed above for increased safety and function at discharge.          PHYSICAL THERAPY  PLAN OF CARE       Physical therapy plan of care is established based on physician order,  patient diagnosis and clinical assessment    Current Treatment Recommendations:    -Bed Mobility: Lower extremity exercises   -Sitting Balance: Incorporate reaching activities to activate trunk muscles   -Standing Balance: Perform strengthening exercises in standing to promote motor control with or without upper extremity support   -Transfers: Provide instruction on proper hand and foot position for adequate transfer of weight onto lower extremities and use of gait device if needed and Cues for hand placement, technique and safety. Provide stabilization to prevent fall   -Gait: Gait training, Standing activities to improve: base of support, weight shift, weight bearing , and Pregait training to emphasize: Sequencing , Device control, Upright, and Safety   -Endurance: Utilize Supervised activities to increase level of endurance to allow for safe functional mobility including transfers and gait     PT long term treatment goals are located in below grid    Patient and or family understand(s) diagnosis, prognosis, and plan of care. Frequency of treatments: Patient will be seen  daily.          Prior Level of Function: Patient ambulated with wheeled walker   short distance, transfers to wheelchair  Rehab Potential: good   for baseline    Past medical history:   Past Medical History:   Diagnosis Date    A-fib ([address]     Acute on chronic congestive heart failure (HCC)     Anxiety     Asthma     CAD (coronary artery disease) 01/21/2016    Cancer ([facility]  breast ca 2006    right lumpectomy    Chronic kidney disease     nephrolithiasis    COPD exacerbation ([facility] 10/12/2022    Depression     Diabetes mellitus ([address]     H/O mammogram     Hx MRSA infection     toe infection january 2012    Hyperlipidemia     Hypertension     Lateral epicondylitis     Morbid obesity ([address]     SERENA on CPAP     Oxygen dependent     Parkinson's disease ([address]     Tubal ligation status      Past Surgical History:   Procedure Laterality Date    BREAST LUMPECTOMY      BREAST REDUCTION SURGERY      CARDIAC CATHETERIZATION  4/28/2014    Dr. Abdirashid Mtz  01/11/2022    Dr. Priscilla Ortiz  7/29/15    CT GUIDED CHEST TUBE  7/8/2022    CT GUIDED CHEST TUBE 7/8/2022 Brooklynn Viveros MD [facility]    ENDOSCOPY, COLON, DIAGNOSTIC  7/19/15    GALLBLADDER SURGERY      LUMBAR LAMINECTOMY      TOE AMPUTATION      TONSILLECTOMY      UPPER GASTROINTESTINAL ENDOSCOPY      UPPER GASTROINTESTINAL ENDOSCOPY N/A 7/19/2022    EGD ESOPHAGOGASTRODUODENOSCOPY performed by Tena Abbott MD at [address]:    Precautions: Up as tolerated, falls ,   O2, Parkinson's disease    Imaging results: XR CHEST (2 VW)    Result Date: 10/12/2022  EXAMINATION: TWO XRAY VIEWS OF THE CHEST 10/12/2022 5:19 pm      Cardiomegaly with persistent interstitial and alveolar pulmonary edema which is most pronounced on the right. CT HEAD WO CONTRAST    Result Date: 10/9/2022  EXAMINATION: CT OF THE HEAD WITHOUT CONTRAST  10/9/2022 12:47 pm      No acute intracranial abnormality. CT ABDOMEN PELVIS W IV CONTRAST Additional Contrast? None    Result Date: 10/6/2022  EXAMINATION: CT OF THE ABDOMEN AND PELVIS WITH CONTRAST 10/6/2022 11:33 am    Subtle suggestion of hepatic steatosis and cirrhosis including portal venous hypertension with splenomegaly. Small volume abdominal ascites and mild anasarca. Bilateral lower lobe infiltrates and pleural effusions, right more than left. Pneumonia is suspected. Stable benign left adrenal adenoma. No imaging follow-up is required. Chronic left renal scarring probably due to remote infection. Small collection of gas within the endometrial cavity presumably iatrogenically introduced. No definite uterine pathology is appreciated. No focal uterine fluid collections. The uterus could be further evaluated sonographically if clinically indicated. Stool-filled colon particularly on the right suggesting constipation. XR CHEST PORTABLE    Result Date: 10/8/2022  EXAMINATION: ONE XRAY VIEW OF THE CHEST 10/8/2022 7:04 am      Redemonstration of interstitial pulmonary edema or pneumonia bilaterally appearing to have increased in right lung base.     Elizabeth Abelson is most likely a confluence of pulmonary vessels.    US DUP UPPER EXTREMITY LEFT VENOUS    Result Date: 10/8/2022  EXAMINATION: VENOUS ULTRASOUND OF THE LEFT UPPER EXTREMITY, 10/8/2022 6:54 pm      No evidence of DVT. RECOMMENDATIONS: Unavailable     44e  Social history: Patient [facility] [employer] and [place] [facility] [facility] cleared patient for PT treatment. Patient sitting in bedside chair at start of session. OBJECTIVE;   Initial Evaluation  Date: 10/13/2022 Treatment Date:    10/20/2022   Short Term/ Long Term   Goals   Was pt agreeable to Eval/treatment? Yes yes To be met in 3 days   Pain level   0/10    0/10    Bed Mobility    Rolling: Not assessed     Supine to sit: Minimal assist of 1    Sit to supine: Not assessed     Scooting: Minimal assist of 1   Rolling: Not assessed patient in chair    Rolling: Independent    Supine to sit:  Independent    Sit to supine: Independent    Scooting: Independent     Transfers Sit to stand: Minimal assist of 1 Cues for hand placement and safety  Sit to stand: Supervision x multiple reps    Sit to stand: Supervision      Ambulation     5 feet using  wheeled walker with Minimal assist of 1   for walker control and cues for sequencing, upright posture, and safety 2x5 feet 30 feet using  wheeled walker with Supervision   cues for safety and upright    20 feet using  wheeled walker with Supervision     Stair negotiation: ascended and descended   Not assessed          ROM Within functional limits     Increase range of motion 10% of affected joints    Strength BUE:  refer to OT eval  RLE:  3+ 4-/5  LLE:  3+ 4-/5  Increase strength in affected mm groups by 1/3 grade   Balance Sitting EOB:  good -  Dynamic Standing:  fair wheeled walker  Sitting EOB: good   Dynamic Standing: fair+ with wheeled walker    Sitting EOB:  good    Dynamic Standing: good -wheeled walker      Patient is Alert & Oriented x person, place, time, and situation and follows directions    Sensation:  Patient  reports numbness/tingling bilateral toes     Edema:  yes bilateral lower extremities   Endurance: fair       Vitals:  4 liters high flow nasal cannula   Blood Pressure at rest  Blood Pressure during session    Heart Rate at rest 94 Heart Rate during session    SPO2 at rest 98%  SPO2 during session %     Patient education  Patient educated on role of Physical Therapy, risks of immobility, safety and plan of care, energy conservation,  importance of mobility while in hospital , safety , and seated exercises      Patient response to education:   Pt verbalized understanding Pt demonstrated skill Pt requires further education in this area   Yes Partial Yes      Treatment:  Patient practiced and was instructed/facilitated in the following treatment: Patient stood to amb in room and back to chair for rest. Patient stood to amb again in room (prior gait from previous treatment session had WC follow behind for safety due to sudden onset of fatigue in past, however not needed this time )  and back to bedside for rest. Patient stood to amb to bedside commode, on and off and back to EOB. Patient performed seated exercises EOB. Sat edge of bed 10 minutes with Supervision  to increase dynamic sitting balance and activity tolerance. Seated challenges and assisted with gown change. Therapeutic Exercises:  ankle pumps, long arc quad, and seated marching  x 20 reps. At end of session, patient in chair with     call light and phone within reach,  all lines and tubes intact, nursing notified. Patient would benefit from continued skilled Physical Therapy to improve functional independence and quality of life.          Patient's/ family goals   [facility] to [facility] in  1320  Time out 1358    Total Treatment Time  38 minutes      CPT codes:  Therapeutic activities (62182)   18 minutes  1 unit(s)  Therapeutic exercises (26384)   10 minutes  1 unit(s)  Gait Training (53524) 10 minutes 1 unit(s)    Jamal Ricketts, [facility] #[id number]

## 2022-10-20 NOTE — PLAN OF CARE
Problem: Discharge Planning  Goal: Discharge to home or other facility with appropriate resources  Outcome: Progressing  Flowsheets (Taken 10/20/2022 0810)  Discharge to home or other facility with appropriate resources: Identify barriers to discharge with patient and caregiver     Problem: Safety - Adult  Goal: Free from fall injury  Outcome: Progressing     Problem: ABCDS Injury Assessment  Goal: Absence of physical injury  Outcome: Progressing     Problem: Skin/Tissue Integrity  Goal: Absence of new skin breakdown  Description: 1. Monitor for areas of redness and/or skin breakdown  2. Assess vascular access sites hourly  3. Every 4-6 hours minimum:  Change oxygen saturation probe site  4. Every 4-6 hours:  If on nasal continuous positive airway pressure, respiratory therapy assess nares and determine need for appliance change or resting period.   Outcome: Progressing     Problem: Chronic Conditions and Co-morbidities  Goal: Patient's chronic conditions and co-morbidity symptoms are monitored and maintained or improved  Outcome: Progressing  Flowsheets (Taken 10/20/2022 0810)  Care Plan - Patient's Chronic Conditions and Co-Morbidity Symptoms are Monitored and Maintained or Improved: Monitor and assess patient's chronic conditions and comorbid symptoms for stability, deterioration, or improvement     Problem: Pain  Goal: Verbalizes/displays adequate comfort level or baseline comfort level  Outcome: Progressing  Flowsheets  Taken 10/20/2022 1624  Verbalizes/displays adequate comfort level or baseline comfort level: Encourage patient to monitor pain and request assistance  Taken 10/20/2022 0810  Verbalizes/displays adequate comfort level or baseline comfort level:   Encourage patient to monitor pain and request assistance   Assess pain using appropriate pain scale

## 2022-10-20 NOTE — PLAN OF CARE
Problem: Discharge Planning  Goal: Discharge to home or other facility with appropriate resources  Outcome: Progressing  Flowsheets (Taken 10/20/2022 0100)  Discharge to home or other facility with appropriate resources: Identify barriers to discharge with patient and caregiver     Problem: Safety - Adult  Goal: Free from fall injury  Outcome: Progressing     Problem: ABCDS Injury Assessment  Goal: Absence of physical injury  Outcome: Progressing     Problem: Skin/Tissue Integrity  Goal: Absence of new skin breakdown  Description: 1. Monitor for areas of redness and/or skin breakdown  2. Assess vascular access sites hourly  3. Every 4-6 hours minimum:  Change oxygen saturation probe site  4. Every 4-6 hours:  If on nasal continuous positive airway pressure, respiratory therapy assess nares and determine need for appliance change or resting period.   Outcome: Progressing

## 2022-10-20 NOTE — CARE COORDINATION
10/20/22 0956 CM note: CM note: COVID (-) 10/12/22. 3L nc. Pt came to us as a long-term patient at [facility]; however Per Kathrine esposito, d/t pts daughter not resolving issues with their office, daughter being verbally inappropriate with the office during discussion, and no bed availability (pt out of bed hold days) they are unable to take pt at this facility. Pts son Merlinda Rooks has now stepped in to assist with discharge planning. Merlinda Rooks has provided the following SNF choices: 1) [facility]- not taking any new referrals d/t staffing issues 2) [facility]- referral given to Elisa and she is reviewing 3) [facility] 4) [facility]. CM will follow.  Electronically signed by Zenaida Ortiz RN on 10/20/2022 at 10:03 AM

## 2022-10-20 NOTE — PROGRESS NOTES
OCCUPATIONAL THERAPY BEDSIDE TREATMENT NOTE   [address] [facility] Izaiah Gregorio. [place]     Date:10/20/2022  Patient Name: Amina Mancini  MRN: [id number]  : 1950  Room: [address] [place] [address]-      Placement Recommendation: Subacute Rehab         Diagnosis:   1. Acute on chronic respiratory failure with hypercapnia (HCC)    2. COPD exacerbation ([facility] [address]    3.  Acute on chronic congestive heart failure, unspecified heart failure type [facility])         Surgery: None       Pertinent Medical History:       Past Medical History           Past Medical History:   Diagnosis Date    A-fib ([facility] [address]      Acute on chronic congestive heart failure (HCC)      Anxiety      Asthma      CAD (coronary artery disease) 2016    Cancer ([facility] [address]  breast ca 2006     right lumpectomy    Chronic kidney disease       nephrolithiasis    COPD exacerbation ([facility] [address] 10/12/2022    Depression      Diabetes mellitus ([facility] [address]      H/O mammogram      Hx MRSA infection       toe infection 2012    Hyperlipidemia      Hypertension      Lateral epicondylitis      Morbid obesity (HCC)      SERENA on CPAP      Oxygen dependent      Parkinson's disease ([address]      Tubal ligation status              Past Surgical History             Past Surgical History:   Procedure Laterality Date    BREAST LUMPECTOMY        BREAST REDUCTION SURGERY        CARDIAC CATHETERIZATION   2014     Dr. Talia Aj   2022     Dr. Jael Fofana   7/29/15    CT GUIDED CHEST TUBE   2022     CT GUIDED CHEST TUBE 2022 Gian Stevens MD [facility]    ENDOSCOPY, COLON, DIAGNOSTIC   7/19/15    GALLBLADDER SURGERY        LUMBAR LAMINECTOMY        TOE AMPUTATION        TONSILLECTOMY        UPPER GASTROINTESTINAL ENDOSCOPY        UPPER GASTROINTESTINAL ENDOSCOPY N/A 2022     EGD ESOPHAGOGASTRODUODENOSCOPY performed by Natalia Vegas MD at [facility] ENDOSCOPY          Precautions:  Up as tolerated, falls , O2, Parkinson's disease      Assessment of current deficits    [x] Functional mobility            [x]ADLs           [x] Strength                   []Cognition    [x] Functional transfers          [x] IADLs          [x] Safety Awareness   [x]Endurance    [] Fine Coordination              [x] Balance      [] Vision/perception    []Sensation      []Gross Motor Coordination  [] ROM           [] Delirium                   [] Motor Control      OT PLAN OF CARE   OT POC based on physician orders, patient diagnosis and results of clinical assessment     Frequency/Duration 1-3 days/wk for 2 weeks PRN      Specific OT Treatment Interventions to include:   * Instruction/training on adapted ADL techniques and AE recommendations to increase functional independence within precautions       * Training on energy conservation strategies, correct breathing pattern and techniques to improve independence/tolerance for self-care routine  * Functional transfer/mobility training/DME recommendations for increased independence, safety, and fall prevention  * Patient/Family education to increase follow through with safety techniques and functional independence  * Recommendation of environmental modifications for increased safety with functional transfers/mobility and ADLs  * Therapeutic exercise to improve motor endurance, ROM, and functional strength for ADLs/functional transfers  * Therapeutic activities to facilitate/challenge dynamic balance, stand tolerance for increased safety and independence with ADLs  * Therapeutic activities to facilitate gross/fine motor skills for increased independence with ADLs  * Positioning to improve skin integrity, interaction with environment and functional independence     Recommended Adaptive Equipment: TBD       Home Living: Patient lives in a skilled nursing facility [facility]        Equipment owned: [employer], and [employer]      Prior Level of Function: requires assist with ADLs ; ambulated with wheeled walker short distance, transfers to wheelchair     Enjoys: games on phone       Pain Level: pt denied pain              Cognition: A&O: 4/4; Follows 3 step directions              Memory: good               Sequencing: good               Problem solving: good               Judgement/safety: good      [facility]   How much help for putting on and taking off regular lower body clothing?: A Little  How much help for Bathing?: A Lot  How much help for Toileting?: A Lot  How much help for putting on and taking off regular upper body clothing?: A Little  How much help for taking care of personal grooming?: A Little  How much help for eating meals?: A Little  AM-[facility] Inpatient Daily Activity Raw Score: 16  AM-[facility] Inpatient ADL T-Scale Score : 35.96  ADL Inpatient CMS 0-100% Score: 53.32  ADL Inpatient CMS G-Code Modifier : CK                Functional Assessment:     Initial Eval Status  Date: 10/14/22    Treatment Status  Date:10/20/22 STGs = LTGs  Time frame: 10-14 days   Feeding Supervision    N/T  Independent    Grooming Minimal Assist    N/T Supervision    UB Dressing Moderate Assist    N/T  Supervision    LB Dressing Dependent    Minimal Assist to doff/don sock seated at EOB via cross-leg technique. Minimal Assist    Bathing Moderate Assist     N/T  Minimal Assist    Toileting Dependent   For rear hygiene while standing at walker; pt has genao catheter. Pt would benefit from bedside commode. Nursing informed. N/T ; SBA for commode transfer  Minimal Assist    Bed Mobility  Supine to sit: Minimal Assist   Sit to supine: Not Assessed     N/T; pt seated at EOB at start of session and in chair at end of session  Supine to sit: Independent   Sit to supine: Independent    Functional Transfers Sit to stand: Minimal Assist   Stand to sit: Minimal Assist      Transfer training with verbal cues for hand placement throughout session to improve safety.      Supervision for overall safety with sit <> stand tranfers from EOB and commode; stand to sit at chair. Pt displays proper hand placement and technique. Independent    Functional Mobility Minimal Assist with wheeled walker to improve balance from EOB to chair, verbal cues for walker sequence and safety. Min A with FWW for household distances to/from bathroom. Assist for mgmt of O2 line. VC for pacing and overall safety. Moderate [place] with use of wheeled walker    Balance Sitting:     Static: fair plus    Dynamic: fair unsupported   Standing: fair/fair minus with walker     Sitting:     Static: fair plus    Dynamic: fair unsupported   Standing: fair with walker   Sitting:     Static: good    Dynamic: good  Standing: fair plus with walker    Activity Tolerance fair   Fair/fair minus; seated rest breaks d/t fatigue  Increase standing tolerance >3 minutes for improved engagement with functional transfers and indep in ADLs      Visual/  Perceptual Glasses: yes        NA       Comments: RN cleared patient for OT. Upon arrival to the room the patient was at EOB. At end of the session, the patient was in chair. Call light and phone within reach. Pt required verbal cues and instruction as noted above for safe facilitation and completion of tasks. Therapist provided skilled monitoring of the patient's response during treatment session. Prior to and at the end of session, environmental modifications/line management completed for patients safety and efficiency of treatment session. Overall, the patient demonstrates decreased independence with ADLs and functional transfers and mobility. Pt limited by generalized weakness, endurance, and activity tolerance. Pt would benefit from continued skilled OT to increase independence with BADL's and IADL's.      Treatment: OT treatment provided this date includes:  Instruction/training on safe functional mobility/transfer techniques including hand and feet placement   Instruction/training on energy conservation/work simplification for completion of ADLs  1x15 reps UE there x completed through all planes to increase strength/endurance with ADLs    Skilled monitoring of O2 sats - see section above     Pt has made fair progress towards set goals. Continue with current plan of care       Treatment time includes thorough review of current medical information, gathering information on past medical history/social history and prior level of function, completion of standardized testing/informal observation of tasks, assessment of data, and development of POC/Goals.     Time In: 3:05 PM    Time Out: 2:20 PM                  Min Units   OT Eval Low 44345     OT Eval Medium 40898     OT Eval High 02403     OT Re-Eval 56581          ADL/Self Care 60587     Therapeutic Activities 03213 10 1   Therapeutic Ex 51654 5 0   Orthotic Management 19228     Neuro Re-Ed 23991     Non-Billable Time     TOTAL TIMED TREATMENT 15 1     Chantel Gaona OTR/L #[id number]

## 2022-10-20 NOTE — PROGRESS NOTES
Progress Note  Date:10/20/2022       Room:0515/0515-02  Patient Name:Janneth Coelho     YOB: 1950     Age:71 y.o. Subjective    Subjective:  Symptoms:  Stable. She reports shortness of breath. Diet:  Adequate intake. No nausea or vomiting. Activity level: Impaired due to weakness. Pain:  She reports no pain. Review of Systems   Constitutional:  Negative for fever. Respiratory:  Positive for shortness of breath. Gastrointestinal:  Negative for nausea and vomiting. All other systems reviewed and are negative. Objective         Vitals Last 24 Hours:  TEMPERATURE:  Temp  Av.2 °F (36.8 °C)  Min: 98.1 °F (36.7 °C)  Max: 98.4 °F (36.9 °C)  RESPIRATIONS RANGE: Resp  Av  Min: 16  Max: 16  PULSE OXIMETRY RANGE: SpO2  Av %  Min: 100 %  Max: 100 %  PULSE RANGE: Pulse  Av  Min: 60  Max: 107  BLOOD PRESSURE RANGE: Systolic (92OLP), VRU:055 , Min:110 , AIF:685   ; Diastolic (77QHG), QXT:73, Min:56, Max:81    I/O (24Hr): Intake/Output Summary (Last 24 hours) at 10/20/2022 1601  Last data filed at 10/20/2022 1001  Gross per 24 hour   Intake 240 ml   Output --   Net 240 ml     Objective:  General Appearance:  Ill-appearing. Vital signs: (most recent): Blood pressure 123/65, pulse 78, temperature 97.7 °F (36.5 °C), temperature source Axillary, resp. rate 18, height 5' (1.524 m), weight 220 lb (99.8 kg), SpO2 100 %, not currently breastfeeding. Vital signs are normal.  No fever. Output: Producing urine and producing stool. Lungs: There are decreased breath sounds. Heart: Normal rate. Regular rhythm. Abdomen: Abdomen is soft. Extremities: There is dependent edema. Labs/Imaging/Diagnostics    Labs:  CBC:No results for input(s): WBC, RBC, HGB, HCT, MCV, RDW, PLT in the last 72 hours. CHEMISTRIES:No results for input(s): NA, K, CL, CO2, BUN, CREATININE, GLUCOSE, CA, PHOS, MG in the last 72 hours.   PT/INR:  Recent Labs     10/18/22  0940 PROTIME 14.7*   INR 1.3     APTT:No results for input(s): APTT in the last 72 hours. LIVER PROFILE:No results for input(s): AST, ALT, BILIDIR, BILITOT, ALKPHOS in the last 72 hours. Imaging Last 24 Hours:  No results found. Assessment//Plan           Hospital Problems             Last Modified POA    * (Principal) COPD exacerbation ([facility] [address] 10/12/2022 Yes    Acute on chronic respiratory failure with hypercapnia (HCC) 10/13/2022 Yes    Persistent atrial fibrillation ([facility] [address] 10/13/2022 Yes     Assessment:    Condition: In stable condition. Improving. (Doing well  improved). Plan:   Continue respiratory treatments. Consults: pulmonology and cardiology. Regular diet. (Continue current management  Labs in am  Will follow).      Electronically signed by Mauricio Charlton DO on 10/20/22 at 4:01 PM EDT

## 2022-10-20 NOTE — PROGRESS NOTES
Pulmonary/Critical Care Progress Note    We are following patient for acute superimposed on chronic hypoxemic and hypercapnic respiratory failure, exacerbation of COPD, question right lower lobe pneumonia, alveolar hypoventilation, CKD, CHF, atrial fibrillation, diabetes mellitus type 2, morbid obesity, obstructive sleep apnea, history of Parkinson's disease      SUBJECTIVE:  Patient continues to improve she denies any complaints denies fever, chills, or rigors. .  She is less short of breath. Heart rate is much better controlled. She underwent thoracentesis . Fluid is transudative. Currently on 3 L nasal cannula. MEDICATIONS:   predniSONE  15 mg Oral Daily    furosemide  40 mg Oral Daily    atorvastatin  40 mg Oral Nightly    sodium chloride flush  5-40 mL IntraVENous 2 times per day    apixaban  5 mg Oral BID    carbidopa-levodopa  2 tablet Oral TID    insulin glargine  13 Units SubCUTAneous BID    metoprolol succinate  100 mg Oral BID    mirtazapine  15 mg Oral Nightly    montelukast  10 mg Oral Nightly    pantoprazole  40 mg Oral QAM    rOPINIRole  1 mg Oral TID    sucralfate  1 g Oral 4x Daily    insulin lispro  0-16 Units SubCUTAneous TID WC    insulin lispro  0-4 Units SubCUTAneous Nightly    ipratropium-albuterol  1 ampule Inhalation Q4H      dextrose      sodium chloride       glucose, dextrose bolus **OR** dextrose bolus, glucagon (rDNA), dextrose, sodium chloride flush, sodium chloride, ondansetron **OR** ondansetron, polyethylene glycol, acetaminophen **OR** acetaminophen, docusate sodium, LORazepam, loperamide, promethazine      REVIEW OF SYSTEMS:  Constitutional: Denies fever, weight loss, night sweats, and fatigue  Skin: Denies pigmentation, dark lesions, and rashes   HEENT: Denies hearing loss, tinnitus, ear drainage, epistaxis, sore throat, and hoarseness. Cardiovascular: Denies palpitations, chest pain, and chest pressure.   Respiratory: Denies cough, dyspnea at rest, hemoptysis, apnea, and choking. Gastrointestinal: Denies nausea, vomiting, poor appetite, diarrhea, heartburn or reflux  Genitourinary: Denies dysuria, frequency, urgency or hematuria  Musculoskeletal: Denies myalgias, muscle weakness, and bone pain  Neurological: Denies dizziness, vertigo, headache, and focal weakness  Psychological: Denies anxiety and depression  Endocrine: Denies heat intolerance and cold intolerance  Hematopoietic/Lymphatic: Denies bleeding problems and blood transfusions    OBJECTIVE:  Vitals:    10/20/22 0810   BP: (!) 110/59   Pulse:    Resp: 16   Temp: 98.1 °F (36.7 °C)   SpO2: 100%     FiO2 : 35 %  O2 Flow Rate (L/min): 3 L/min  O2 Device: Nasal cannula    PHYSICAL EXAM:  Constitutional: No fever, chills, diaphoresis  Skin: No skin rash, no skin breakdown  HEENT: Unremarkable  Neck: No JVD, lymphadenopathy, thyromegaly  Cardiovascular: S1, S2 irregular. Grade 1/6 to 2/6 systolic ejection murmur left sternal border  Respiratory: Few crackles right base, minimal end expiratory wheezes bilaterally  Gastrointestinal: Soft, obese, nontender  Genitourinary: No CVA tenderness  Extremities:, No clubbing, cyanosis, or edema  Neurological: Awake, alert, oriented x3. No evidence of focal motor or sensory deficits  Psychological: In good spirits. Appropriate affect. Very pleased with her progress.     LABS:  WBC   Date Value Ref Range Status   10/16/2022 7.1 4.5 - 11.5 E9/L Final   10/14/2022 7.4 4.5 - 11.5 E9/L Final   10/13/2022 5.4 4.5 - 11.5 E9/L Final     Hemoglobin   Date Value Ref Range Status   10/16/2022 9.9 (L) 11.5 - 15.5 g/dL Final   10/14/2022 10.0 (L) 11.5 - 15.5 g/dL Final   10/13/2022 10.0 (L) 11.5 - 15.5 g/dL Final     Hematocrit   Date Value Ref Range Status   10/16/2022 33.8 (L) 34.0 - 48.0 % Final   10/14/2022 34.8 34.0 - 48.0 % Final   10/13/2022 34.3 34.0 - 48.0 % Final     MCV   Date Value Ref Range Status   10/16/2022 92.6 80.0 - 99.9 fL Final   10/14/2022 93.5 80.0 - 99.9 fL Final   10/13/2022 91.0 80.0 - 99.9 fL Final     Platelets   Date Value Ref Range Status   10/16/2022 247 130 - 450 E9/L Final   10/14/2022 269 130 - 450 E9/L Final   10/13/2022 233 130 - 450 E9/L Final     Sodium   Date Value Ref Range Status   10/16/2022 139 132 - 146 mmol/L Final   10/14/2022 139 132 - 146 mmol/L Final   10/13/2022 141 132 - 146 mmol/L Final     Potassium   Date Value Ref Range Status   10/16/2022 4.3 3.5 - 5.0 mmol/L Final   10/14/2022 4.8 3.5 - 5.0 mmol/L Final   10/08/2022 4.0 3.5 - 5.0 mmol/L Final     Potassium reflex Magnesium   Date Value Ref Range Status   10/13/2022 4.2 3.5 - 5.0 mmol/L Final   10/12/2022 4.3 3.5 - 5.0 mmol/L Final   10/05/2022 4.2 3.5 - 5.0 mmol/L Final     Chloride   Date Value Ref Range Status   10/16/2022 93 (L) 98 - 107 mmol/L Final   10/14/2022 93 (L) 98 - 107 mmol/L Final   10/13/2022 99 98 - 107 mmol/L Final     CO2   Date Value Ref Range Status   10/16/2022 39 (H) 22 - 29 mmol/L Final   10/14/2022 38 (H) 22 - 29 mmol/L Final   10/13/2022 32 (H) 22 - 29 mmol/L Final     BUN   Date Value Ref Range Status   10/16/2022 29 (H) 6 - 23 mg/dL Final   10/14/2022 27 (H) 6 - 23 mg/dL Final   10/13/2022 19 6 - 23 mg/dL Final     Creatinine   Date Value Ref Range Status   10/16/2022 0.9 0.5 - 1.0 mg/dL Final   10/14/2022 1.1 (H) 0.5 - 1.0 mg/dL Final   10/13/2022 0.9 0.5 - 1.0 mg/dL Final     Glucose   Date Value Ref Range Status   10/16/2022 230 (H) 74 - 99 mg/dL Final   10/14/2022 334 (H) 74 - 99 mg/dL Final   10/13/2022 215 (H) 74 - 99 mg/dL Final     Calcium   Date Value Ref Range Status   10/16/2022 8.9 8.6 - 10.2 mg/dL Final   10/14/2022 8.8 8.6 - 10.2 mg/dL Final   10/13/2022 8.1 (L) 8.6 - 10.2 mg/dL Final     Total Protein   Date Value Ref Range Status   10/14/2022 6.3 (L) 6.4 - 8.3 g/dL Final   10/13/2022 5.6 (L) 6.4 - 8.3 g/dL Final   10/08/2022 4.8 (L) 6.4 - 8.3 g/dL Final     Albumin   Date Value Ref Range Status   10/14/2022 3.5 3.5 - 5.2 g/dL Final   10/13/2022 3.1 (L) 3.5 - 5.2 g/dL Final   10/08/2022 2.5 (L) 3.5 - 5.2 g/dL Final     Total Bilirubin   Date Value Ref Range Status   10/14/2022 0.3 0.0 - 1.2 mg/dL Final   10/13/2022 0.3 0.0 - 1.2 mg/dL Final   10/08/2022 0.4 0.0 - 1.2 mg/dL Final     Alkaline Phosphatase   Date Value Ref Range Status   10/14/2022 98 35 - 104 U/L Final   10/13/2022 87 35 - 104 U/L Final   10/08/2022 74 35 - 104 U/L Final     AST   Date Value Ref Range Status   10/14/2022 7 0 - 31 U/L Final   10/13/2022 12 0 - 31 U/L Final     Comment:     Specimen is slightly Hemolyzed. Result may be artificially increased. 10/08/2022 7 0 - 31 U/L Final     ALT   Date Value Ref Range Status   10/14/2022 <5 0 - 32 U/L Final   10/13/2022 <5 0 - 32 U/L Final   10/08/2022 5 0 - 32 U/L Final     GFR Non-   Date Value Ref Range Status   10/16/2022 >60 >=60 mL/min/1.73 Final     Comment:     Chronic Kidney Disease: less than 60 ml/min/1.73 sq.m. Kidney Failure: less than 15 ml/min/1.73 sq.m. Results valid for patients 18 years and older. 10/14/2022 49 >=60 mL/min/1.73 Final     Comment:     Chronic Kidney Disease: less than 60 ml/min/1.73 sq.m. Kidney Failure: less than 15 ml/min/1.73 sq.m. Results valid for patients 18 years and older. 10/13/2022 >60 >=60 mL/min/1.73 Final     Comment:     Chronic Kidney Disease: less than 60 ml/min/1.73 sq.m. Kidney Failure: less than 15 ml/min/1.73 sq.m. Results valid for patients 18 years and older.        GFR    Date Value Ref Range Status   10/16/2022 >60  Final   10/14/2022 59  Final   10/13/2022 >60  Final     Magnesium   Date Value Ref Range Status   10/14/2022 2.1 1.6 - 2.6 mg/dL Final   10/08/2022 1.9 1.6 - 2.6 mg/dL Final   10/07/2022 1.8 1.6 - 2.6 mg/dL Final     Phosphorus   Date Value Ref Range Status   10/08/2022 3.0 2.5 - 4.5 mg/dL Final   10/07/2022 2.8 2.5 - 4.5 mg/dL Final   10/06/2022 2.6 2.5 - 4.5 mg/dL Final     No results for input(s): PH, PO2, PCO2, HCO3, BE, O2SAT in the last 72 hours. RADIOLOGY:  FL MODIFIED BARIUM SWALLOW W VIDEO   Final Result   Swallowing mechanism grossly normal with evidence of penetration aspiration   as above. Please see separate speech pathology report for full discussion of findings   and recommendations. XR CHEST 1 VIEW   Final Result   1. There is no pneumothorax status post right thoracentesis. IR GUIDED THORACENTESIS PLEURAL   Final Result   Successful ultrasound guided thoracentesis. XR CHEST PORTABLE   Final Result   Worsening right lower lobe infiltrate and effusion         XR CHEST PORTABLE   Final Result   Right lower lobe opacity possibly representing atelectasis. CT CHEST W CONTRAST   Final Result   1. Interlobular septal thickening in the upper lungs, bilateral pleural   effusions greater on the right, 4 chamber cardiac enlargement, pericardial   effusion and hepatosplenomegaly. Trace right upper quadrant perihepatic   ascites. Findings suggest volume overload/CHF. 2.  Partial passive atelectasis of the lower lobes, right middle lobe and   lingular segment. 3.  Prominent main pulmonary artery, consider pulmonary artery hypertension. No filling defects in the pulmonary artery to suggest embolism. 4.  Postop changes in the right axilla and right breast.         XR CHEST (2 VW)   Final Result   Cardiomegaly with persistent interstitial and alveolar pulmonary edema which   is most pronounced on the right. PROBLEM LIST:  Principal Problem:    COPD exacerbation ([address]  Active Problems:    Acute on chronic respiratory failure with hypercapnia (HCC)    Persistent atrial fibrillation (HCC)  Resolved Problems:    * No resolved hospital problems.  *      IMPRESSION:  Acute, superimposed on chronic hypoxemic and hypercapnic respiratory failure  Morbid obesity  Obstructive sleep apnea  Alveolar hypoventilation syndrome  Diabetes mellitus type 2  HFpEF  Possible right lower lobe pneumonia  Severe pulmonary hypertension with an element of right heart failure  Atrial fibrillation on Eliquis with controlled ventricular response  Chronic kidney disease  Small bilateral pleural effusions (transudative right-sided pleural effusion on October 18, 2022)  Severe COPD with exacerbation  Cor pulmonale    PLAN:  Discontinue antibiotics  BiPAP as needed and nightly  Continue inhaled bronchodilators   Chest x-ray 10/17/22 showed worsening right-sided pleural effusion  PT and OT are essential and will continue  Eliquis was resumed. thoracentesis was completed on October 18, 2022 520 cc removed. Patient had a thoracentesis in August 26, 2022. Wean FiO2 as tolerated and assess the need for home oxygen before discharge  Patient can be discharged from pulmonary point of view.       Electronically signed by Nataliia Mike MD on 10/20/2022 at 1:35 PM

## 2022-10-21 LAB
AFB SMEAR: NORMAL
METER GLUCOSE: 166 MG/DL (ref 74–99)
METER GLUCOSE: 175 MG/DL (ref 74–99)
METER GLUCOSE: 276 MG/DL (ref 74–99)
METER GLUCOSE: 347 MG/DL (ref 74–99)
SARS-COV-2, NAAT: NOT DETECTED

## 2022-10-21 PROCEDURE — 6370000000 HC RX 637 (ALT 250 FOR IP): Performed by: FAMILY MEDICINE

## 2022-10-21 PROCEDURE — 2580000003 HC RX 258: Performed by: FAMILY MEDICINE

## 2022-10-21 PROCEDURE — 2700000000 HC OXYGEN THERAPY PER DAY

## 2022-10-21 PROCEDURE — 6370000000 HC RX 637 (ALT 250 FOR IP): Performed by: INTERNAL MEDICINE

## 2022-10-21 PROCEDURE — 87635 SARS-COV-2 COVID-19 AMP PRB: CPT

## 2022-10-21 PROCEDURE — 94660 CPAP INITIATION&MGMT: CPT

## 2022-10-21 PROCEDURE — 6370000000 HC RX 637 (ALT 250 FOR IP): Performed by: NURSE PRACTITIONER

## 2022-10-21 PROCEDURE — 94640 AIRWAY INHALATION TREATMENT: CPT

## 2022-10-21 PROCEDURE — 82962 GLUCOSE BLOOD TEST: CPT

## 2022-10-21 PROCEDURE — 2060000000 HC ICU INTERMEDIATE R&B

## 2022-10-21 PROCEDURE — 6370000000 HC RX 637 (ALT 250 FOR IP): Performed by: CLINICAL NURSE SPECIALIST

## 2022-10-21 RX ORDER — INSULIN GLARGINE 100 [IU]/ML
18 INJECTION, SOLUTION SUBCUTANEOUS 2 TIMES DAILY
Status: DISCONTINUED | OUTPATIENT
Start: 2022-10-21 | End: 2022-10-22 | Stop reason: HOSPADM

## 2022-10-21 RX ADMIN — ROPINIROLE HYDROCHLORIDE 1 MG: 1 TABLET, FILM COATED ORAL at 20:22

## 2022-10-21 RX ADMIN — ATORVASTATIN CALCIUM 40 MG: 40 TABLET, FILM COATED ORAL at 20:22

## 2022-10-21 RX ADMIN — MIRTAZAPINE 15 MG: 15 TABLET, ORALLY DISINTEGRATING ORAL at 20:23

## 2022-10-21 RX ADMIN — SUCRALFATE 1 G: 1 TABLET ORAL at 10:04

## 2022-10-21 RX ADMIN — IPRATROPIUM BROMIDE AND ALBUTEROL SULFATE 1 AMPULE: .5; 2.5 SOLUTION RESPIRATORY (INHALATION) at 14:16

## 2022-10-21 RX ADMIN — APIXABAN 5 MG: 5 TABLET, FILM COATED ORAL at 10:05

## 2022-10-21 RX ADMIN — CARBIDOPA AND LEVODOPA 2 TABLET: 25; 100 TABLET, EXTENDED RELEASE ORAL at 15:04

## 2022-10-21 RX ADMIN — METOPROLOL SUCCINATE 100 MG: 50 TABLET, EXTENDED RELEASE ORAL at 10:03

## 2022-10-21 RX ADMIN — ROPINIROLE HYDROCHLORIDE 1 MG: 1 TABLET, FILM COATED ORAL at 16:27

## 2022-10-21 RX ADMIN — MONTELUKAST 10 MG: 10 TABLET, FILM COATED ORAL at 20:22

## 2022-10-21 RX ADMIN — PREDNISONE 15 MG: 5 TABLET ORAL at 10:04

## 2022-10-21 RX ADMIN — CARBIDOPA AND LEVODOPA 2 TABLET: 25; 100 TABLET, EXTENDED RELEASE ORAL at 20:30

## 2022-10-21 RX ADMIN — IPRATROPIUM BROMIDE AND ALBUTEROL SULFATE 1 AMPULE: .5; 2.5 SOLUTION RESPIRATORY (INHALATION) at 05:59

## 2022-10-21 RX ADMIN — CARBIDOPA AND LEVODOPA 2 TABLET: 25; 100 TABLET, EXTENDED RELEASE ORAL at 10:03

## 2022-10-21 RX ADMIN — LORAZEPAM 0.5 MG: 0.5 TABLET ORAL at 23:57

## 2022-10-21 RX ADMIN — METOPROLOL SUCCINATE 100 MG: 50 TABLET, EXTENDED RELEASE ORAL at 20:23

## 2022-10-21 RX ADMIN — IPRATROPIUM BROMIDE AND ALBUTEROL SULFATE 1 AMPULE: .5; 2.5 SOLUTION RESPIRATORY (INHALATION) at 09:32

## 2022-10-21 RX ADMIN — ROPINIROLE HYDROCHLORIDE 1 MG: 1 TABLET, FILM COATED ORAL at 10:04

## 2022-10-21 RX ADMIN — APIXABAN 5 MG: 5 TABLET, FILM COATED ORAL at 20:22

## 2022-10-21 RX ADMIN — INSULIN LISPRO 12 UNITS: 100 INJECTION, SOLUTION INTRAVENOUS; SUBCUTANEOUS at 12:40

## 2022-10-21 RX ADMIN — PANTOPRAZOLE SODIUM 40 MG: 40 TABLET, DELAYED RELEASE ORAL at 10:04

## 2022-10-21 RX ADMIN — IPRATROPIUM BROMIDE AND ALBUTEROL SULFATE 1 AMPULE: .5; 2.5 SOLUTION RESPIRATORY (INHALATION) at 21:54

## 2022-10-21 RX ADMIN — SUCRALFATE 1 G: 1 TABLET ORAL at 16:27

## 2022-10-21 RX ADMIN — Medication 10 ML: at 10:05

## 2022-10-21 RX ADMIN — INSULIN GLARGINE 13 UNITS: 100 INJECTION, SOLUTION SUBCUTANEOUS at 10:01

## 2022-10-21 RX ADMIN — FUROSEMIDE 40 MG: 40 TABLET ORAL at 10:04

## 2022-10-21 RX ADMIN — ACETAMINOPHEN 650 MG: 325 TABLET ORAL at 12:39

## 2022-10-21 RX ADMIN — SUCRALFATE 1 G: 1 TABLET ORAL at 20:22

## 2022-10-21 RX ADMIN — INSULIN GLARGINE 18 UNITS: 100 INJECTION, SOLUTION SUBCUTANEOUS at 20:19

## 2022-10-21 RX ADMIN — Medication 10 ML: at 20:32

## 2022-10-21 ASSESSMENT — PAIN DESCRIPTION - DESCRIPTORS: DESCRIPTORS: ACHING;DISCOMFORT

## 2022-10-21 ASSESSMENT — ENCOUNTER SYMPTOMS
VOMITING: 0
NAUSEA: 0

## 2022-10-21 ASSESSMENT — PAIN DESCRIPTION - ORIENTATION: ORIENTATION: RIGHT

## 2022-10-21 ASSESSMENT — PAIN DESCRIPTION - PAIN TYPE: TYPE: SURGICAL PAIN

## 2022-10-21 ASSESSMENT — PAIN SCALES - GENERAL: PAINLEVEL_OUTOF10: 5

## 2022-10-21 NOTE — CARE COORDINATION
10/21/22 1125 CM note: CM note: COVID (-) 10/12/22. 2L nc. Per Chet Turner, [employer], they can accept patient and WILL START [address]. WILL NEED AUTH, RAPID COVID DAY OF DC, SIGNED [place], AND DC ORDER. Checking with Loki Broderick, [facility] liaison, to see if they've done a PASSR on this pt- awaiting her response. CM will follow.  Electronically signed by Mira Díaz RN on 10/21/2022 at 11:29 AM

## 2022-10-21 NOTE — PROGRESS NOTES
Progress Note  Date:10/21/2022       Room:0515/0515-02  Patient Name:Janneth Javier     YOB: 1950     Age:71 y.o. Subjective    Subjective:  Symptoms:  Stable. She reports malaise and weakness. Diet:  Adequate intake. No nausea or vomiting. Activity level: Impaired due to weakness. Pain:  She reports no pain. Review of Systems   Constitutional:  Negative for fever. Gastrointestinal:  Negative for nausea and vomiting. Neurological:  Positive for weakness. All other systems reviewed and are negative. Objective         Vitals Last 24 Hours:  TEMPERATURE:  Temp  Av °F (36.7 °C)  Min: 97.7 °F (36.5 °C)  Max: 98.2 °F (36.8 °C)  RESPIRATIONS RANGE: Resp  Av.8  Min: 16  Max: 19  PULSE OXIMETRY RANGE: SpO2  Av.6 %  Min: 99 %  Max: 100 %  PULSE RANGE: Pulse  Av.8  Min: 85  Max: 95  BLOOD PRESSURE RANGE: Systolic (98FRU), ENT:727 , Min:94 , NBC:129   ; Diastolic (02QXY), LHM:73, Min:64, Max:82    I/O (24Hr): Intake/Output Summary (Last 24 hours) at 10/21/2022 1508  Last data filed at 10/21/2022 1417  Gross per 24 hour   Intake 600 ml   Output --   Net 600 ml     Objective:  General Appearance:  Comfortable. Vital signs: (most recent): Blood pressure 104/70, pulse 95, temperature 97.8 °F (36.6 °C), temperature source Oral, resp. rate 19, height 5' (1.524 m), weight 219 lb (99.3 kg), SpO2 99 %, not currently breastfeeding. Vital signs are normal.  No fever. Output: Producing urine and producing stool. Lungs: There are decreased breath sounds. Heart: Normal rate. Regular rhythm. Abdomen: Abdomen is soft. Bowel sounds are normal.     Extremities: Decreased range of motion. Pulses: Distal pulses are intact. Neurological: Patient is alert. Skin:  Warm and dry. Labs/Imaging/Diagnostics    Labs:  CBC:No results for input(s): WBC, RBC, HGB, HCT, MCV, RDW, PLT in the last 72 hours.   CHEMISTRIES:No results for input(s): NA, K, CL, CO2, BUN, CREATININE, GLUCOSE, CA, PHOS, MG in the last 72 hours. PT/INR:No results for input(s): PROTIME, INR in the last 72 hours. APTT:No results for input(s): APTT in the last 72 hours. LIVER PROFILE:No results for input(s): AST, ALT, BILIDIR, BILITOT, ALKPHOS in the last 72 hours. Imaging Last 24 Hours:  No results found. Assessment//Plan           Hospital Problems             Last Modified POA    * (Principal) COPD exacerbation ([facility] [address] 10/12/2022 Yes    Acute on chronic respiratory failure with hypercapnia (HCC) 10/13/2022 Yes    Persistent atrial fibrillation ([facility] [address] 10/13/2022 Yes     Assessment:    Condition: In stable condition. Improving. (Waiting for discharge, doing well). Plan:   Other transfer (see comment). Continue respiratory treatments and wean off oxygen. Consults: pulmonology. Regular diet. (Continue current management  Waiting for discharge).      Electronically signed by Michael Urias DO on 10/21/22 at 3:08 PM EDT

## 2022-10-21 NOTE — PLAN OF CARE
Problem: Discharge Planning  Goal: Discharge to home or other facility with appropriate resources  10/21/2022 1147 by Jay Cardenas RN  Outcome: Progressing  10/21/2022 0026 by Rick Coffey RN  Outcome: Amelia Christine (Taken 10/20/2022 2100)  Discharge to home or other facility with appropriate resources: Identify barriers to discharge with patient and caregiver     Problem: Safety - Adult  Goal: Free from fall injury  10/21/2022 1147 by Jay Cardenas RN  Outcome: Progressing  10/21/2022 0026 by Rick Coffey RN  Outcome: Progressing     Problem: ABCDS Injury Assessment  Goal: Absence of physical injury  10/21/2022 1147 by Jay Cardenas RN  Outcome: Progressing  10/21/2022 0026 by Rick Coffey RN  Outcome: Progressing     Problem: Skin/Tissue Integrity  Goal: Absence of new skin breakdown  Description: 1. Monitor for areas of redness and/or skin breakdown  2. Assess vascular access sites hourly  3. Every 4-6 hours minimum:  Change oxygen saturation probe site  4. Every 4-6 hours:  If on nasal continuous positive airway pressure, respiratory therapy assess nares and determine need for appliance change or resting period.   10/21/2022 1147 by Jay Cardenas RN  Outcome: Progressing  10/21/2022 0026 by Rick Coffey RN  Outcome: Progressing     Problem: Chronic Conditions and Co-morbidities  Goal: Patient's chronic conditions and co-morbidity symptoms are monitored and maintained or improved  10/21/2022 1147 by Jay Cardenas RN  Outcome: Progressing  10/21/2022 0026 by Rick Coffey RN  Outcome: Progressing  Flowsheets (Taken 10/20/2022 2100)  Care Plan - Patient's Chronic Conditions and Co-Morbidity Symptoms are Monitored and Maintained or Improved: Monitor and assess patient's chronic conditions and comorbid symptoms for stability, deterioration, or improvement     Problem: Pain  Goal: Verbalizes/displays adequate comfort level or baseline comfort level  10/21/2022 1147 by Jay Cardenas RN  Outcome: Progressing  Flowsheets (Taken 10/21/2022 0945)  Verbalizes/displays adequate comfort level or baseline comfort level: Encourage patient to monitor pain and request assistance  10/21/2022 0026 by Romero Merchant RN  Outcome: Progressing

## 2022-10-21 NOTE — PLAN OF CARE
Problem: Discharge Planning  Goal: Discharge to home or other facility with appropriate resources  10/21/2022 0026 by Diego Huerta RN  Outcome: Progressing  Flowsheets (Taken 10/20/2022 2100)  Discharge to home or other facility with appropriate resources: Identify barriers to discharge with patient and caregiver  10/20/2022 1922 by Cassandra Chilel RN  Outcome: Progressing  Flowsheets (Taken 10/20/2022 0810)  Discharge to home or other facility with appropriate resources: Identify barriers to discharge with patient and caregiver     Problem: Safety - Adult  Goal: Free from fall injury  10/21/2022 0026 by Diego Huerta RN  Outcome: Progressing  10/20/2022 1922 by Cassandra Chilel RN  Outcome: Progressing     Problem: ABCDS Injury Assessment  Goal: Absence of physical injury  10/21/2022 0026 by Diego Huerta RN  Outcome: Progressing  10/20/2022 1922 by Cassandra Chilel RN  Outcome: Progressing     Problem: Skin/Tissue Integrity  Goal: Absence of new skin breakdown  Description: 1. Monitor for areas of redness and/or skin breakdown  2. Assess vascular access sites hourly  3. Every 4-6 hours minimum:  Change oxygen saturation probe site  4. Every 4-6 hours:  If on nasal continuous positive airway pressure, respiratory therapy assess nares and determine need for appliance change or resting period.   10/21/2022 0026 by Diego Huerta RN  Outcome: Progressing  10/20/2022 1922 by Cassandra Chilel RN  Outcome: Progressing

## 2022-10-21 NOTE — PROGRESS NOTES
Pulmonary/Critical Care Progress Note    We are following patient for acute superimposed on chronic hypoxemic and hypercapnic respiratory failure, exacerbation of COPD, question right lower lobe pneumonia, alveolar hypoventilation, CKD, CHF, atrial fibrillation, diabetes mellitus type 2, morbid obesity, obstructive sleep apnea, history of Parkinson's disease      SUBJECTIVE:  Patient continues to improve she denies any complaints denies fever, chills, or rigors. .  She is less short of breath. Heart rate is much better controlled. She underwent thoracentesis on October 18, 2022. Valri Dykes Fluid is transudative. Currently on 2 L nasal cannula. MEDICATIONS:   predniSONE  15 mg Oral Daily    furosemide  40 mg Oral Daily    atorvastatin  40 mg Oral Nightly    sodium chloride flush  5-40 mL IntraVENous 2 times per day    apixaban  5 mg Oral BID    carbidopa-levodopa  2 tablet Oral TID    insulin glargine  13 Units SubCUTAneous BID    metoprolol succinate  100 mg Oral BID    mirtazapine  15 mg Oral Nightly    montelukast  10 mg Oral Nightly    pantoprazole  40 mg Oral QAM    rOPINIRole  1 mg Oral TID    sucralfate  1 g Oral 4x Daily    insulin lispro  0-16 Units SubCUTAneous TID WC    insulin lispro  0-4 Units SubCUTAneous Nightly    ipratropium-albuterol  1 ampule Inhalation Q4H      dextrose      sodium chloride       glucose, dextrose bolus **OR** dextrose bolus, glucagon (rDNA), dextrose, sodium chloride flush, sodium chloride, ondansetron **OR** ondansetron, polyethylene glycol, acetaminophen **OR** acetaminophen, docusate sodium, LORazepam, loperamide, promethazine      REVIEW OF SYSTEMS:  Constitutional: Denies fever, weight loss, night sweats, and fatigue  Skin: Denies pigmentation, dark lesions, and rashes   HEENT: Denies hearing loss, tinnitus, ear drainage, epistaxis, sore throat, and hoarseness. Cardiovascular: Denies palpitations, chest pain, and chest pressure.   Respiratory: Denies cough, dyspnea at rest, hemoptysis, apnea, and choking. Gastrointestinal: Denies nausea, vomiting, poor appetite, diarrhea, heartburn or reflux  Genitourinary: Denies dysuria, frequency, urgency or hematuria  Musculoskeletal: Denies myalgias, muscle weakness, and bone pain  Neurological: Denies dizziness, vertigo, headache, and focal weakness  Psychological: Denies anxiety and depression  Endocrine: Denies heat intolerance and cold intolerance  Hematopoietic/Lymphatic: Denies bleeding problems and blood transfusions    OBJECTIVE:  Vitals:    10/21/22 0831   BP: 104/70   Pulse: 95   Resp: 19   Temp: 97.8 °F (36.6 °C)   SpO2: 99%     FiO2 : 35 %  O2 Flow Rate (L/min): 2 L/min  O2 Device: Nasal cannula    PHYSICAL EXAM:  Constitutional: No fever, chills, diaphoresis  Skin: No skin rash, no skin breakdown  HEENT: Unremarkable  Neck: No JVD, lymphadenopathy, thyromegaly  Cardiovascular: S1, S2 irregular. Grade 1/6 to 2/6 systolic ejection murmur left sternal border  Respiratory: Few crackles right base, minimal end expiratory wheezes bilaterally  Gastrointestinal: Soft, obese, nontender  Genitourinary: No CVA tenderness  Extremities:, No clubbing, cyanosis, or edema  Neurological: Awake, alert, oriented x3. No evidence of focal motor or sensory deficits  Psychological: In good spirits. Appropriate affect. Very pleased with her progress.     LABS:  WBC   Date Value Ref Range Status   10/16/2022 7.1 4.5 - 11.5 E9/L Final   10/14/2022 7.4 4.5 - 11.5 E9/L Final   10/13/2022 5.4 4.5 - 11.5 E9/L Final     Hemoglobin   Date Value Ref Range Status   10/16/2022 9.9 (L) 11.5 - 15.5 g/dL Final   10/14/2022 10.0 (L) 11.5 - 15.5 g/dL Final   10/13/2022 10.0 (L) 11.5 - 15.5 g/dL Final     Hematocrit   Date Value Ref Range Status   10/16/2022 33.8 (L) 34.0 - 48.0 % Final   10/14/2022 34.8 34.0 - 48.0 % Final   10/13/2022 34.3 34.0 - 48.0 % Final     MCV   Date Value Ref Range Status   10/16/2022 92.6 80.0 - 99.9 fL Final   10/14/2022 93.5 80.0 - 99.9 fL Final   10/13/2022 91.0 80.0 - 99.9 fL Final     Platelets   Date Value Ref Range Status   10/16/2022 247 130 - 450 E9/L Final   10/14/2022 269 130 - 450 E9/L Final   10/13/2022 233 130 - 450 E9/L Final     Sodium   Date Value Ref Range Status   10/16/2022 139 132 - 146 mmol/L Final   10/14/2022 139 132 - 146 mmol/L Final   10/13/2022 141 132 - 146 mmol/L Final     Potassium   Date Value Ref Range Status   10/16/2022 4.3 3.5 - 5.0 mmol/L Final   10/14/2022 4.8 3.5 - 5.0 mmol/L Final   10/08/2022 4.0 3.5 - 5.0 mmol/L Final     Potassium reflex Magnesium   Date Value Ref Range Status   10/13/2022 4.2 3.5 - 5.0 mmol/L Final   10/12/2022 4.3 3.5 - 5.0 mmol/L Final   10/05/2022 4.2 3.5 - 5.0 mmol/L Final     Chloride   Date Value Ref Range Status   10/16/2022 93 (L) 98 - 107 mmol/L Final   10/14/2022 93 (L) 98 - 107 mmol/L Final   10/13/2022 99 98 - 107 mmol/L Final     CO2   Date Value Ref Range Status   10/16/2022 39 (H) 22 - 29 mmol/L Final   10/14/2022 38 (H) 22 - 29 mmol/L Final   10/13/2022 32 (H) 22 - 29 mmol/L Final     BUN   Date Value Ref Range Status   10/16/2022 29 (H) 6 - 23 mg/dL Final   10/14/2022 27 (H) 6 - 23 mg/dL Final   10/13/2022 19 6 - 23 mg/dL Final     Creatinine   Date Value Ref Range Status   10/16/2022 0.9 0.5 - 1.0 mg/dL Final   10/14/2022 1.1 (H) 0.5 - 1.0 mg/dL Final   10/13/2022 0.9 0.5 - 1.0 mg/dL Final     Glucose   Date Value Ref Range Status   10/16/2022 230 (H) 74 - 99 mg/dL Final   10/14/2022 334 (H) 74 - 99 mg/dL Final   10/13/2022 215 (H) 74 - 99 mg/dL Final     Calcium   Date Value Ref Range Status   10/16/2022 8.9 8.6 - 10.2 mg/dL Final   10/14/2022 8.8 8.6 - 10.2 mg/dL Final   10/13/2022 8.1 (L) 8.6 - 10.2 mg/dL Final     Total Protein   Date Value Ref Range Status   10/14/2022 6.3 (L) 6.4 - 8.3 g/dL Final   10/13/2022 5.6 (L) 6.4 - 8.3 g/dL Final   10/08/2022 4.8 (L) 6.4 - 8.3 g/dL Final     Albumin   Date Value Ref Range Status   10/14/2022 3.5 3.5 - 5.2 g/dL Final   10/13/2022 3.1 (L) 3.5 - 5.2 g/dL Final   10/08/2022 2.5 (L) 3.5 - 5.2 g/dL Final     Total Bilirubin   Date Value Ref Range Status   10/14/2022 0.3 0.0 - 1.2 mg/dL Final   10/13/2022 0.3 0.0 - 1.2 mg/dL Final   10/08/2022 0.4 0.0 - 1.2 mg/dL Final     Alkaline Phosphatase   Date Value Ref Range Status   10/14/2022 98 35 - 104 U/L Final   10/13/2022 87 35 - 104 U/L Final   10/08/2022 74 35 - 104 U/L Final     AST   Date Value Ref Range Status   10/14/2022 7 0 - 31 U/L Final   10/13/2022 12 0 - 31 U/L Final     Comment:     Specimen is slightly Hemolyzed. Result may be artificially increased. 10/08/2022 7 0 - 31 U/L Final     ALT   Date Value Ref Range Status   10/14/2022 <5 0 - 32 U/L Final   10/13/2022 <5 0 - 32 U/L Final   10/08/2022 5 0 - 32 U/L Final     GFR Non-   Date Value Ref Range Status   10/16/2022 >60 >=60 mL/min/1.73 Final     Comment:     Chronic Kidney Disease: less than 60 ml/min/1.73 sq.m. Kidney Failure: less than 15 ml/min/1.73 sq.m. Results valid for patients 18 years and older. 10/14/2022 49 >=60 mL/min/1.73 Final     Comment:     Chronic Kidney Disease: less than 60 ml/min/1.73 sq.m. Kidney Failure: less than 15 ml/min/1.73 sq.m. Results valid for patients 18 years and older. 10/13/2022 >60 >=60 mL/min/1.73 Final     Comment:     Chronic Kidney Disease: less than 60 ml/min/1.73 sq.m. Kidney Failure: less than 15 ml/min/1.73 sq.m. Results valid for patients 18 years and older.        GFR    Date Value Ref Range Status   10/16/2022 >60  Final   10/14/2022 59  Final   10/13/2022 >60  Final     Magnesium   Date Value Ref Range Status   10/14/2022 2.1 1.6 - 2.6 mg/dL Final   10/08/2022 1.9 1.6 - 2.6 mg/dL Final   10/07/2022 1.8 1.6 - 2.6 mg/dL Final     Phosphorus   Date Value Ref Range Status   10/08/2022 3.0 2.5 - 4.5 mg/dL Final   10/07/2022 2.8 2.5 - 4.5 mg/dL Final   10/06/2022 2.6 2.5 - 4.5 mg/dL Final     No results for input(s): PH, PO2, PCO2, HCO3, BE, O2SAT in the last 72 hours. RADIOLOGY:  FL MODIFIED BARIUM SWALLOW W VIDEO   Final Result   Swallowing mechanism grossly normal with evidence of penetration aspiration   as above. Please see separate speech pathology report for full discussion of findings   and recommendations. XR CHEST 1 VIEW   Final Result   1. There is no pneumothorax status post right thoracentesis. IR GUIDED THORACENTESIS PLEURAL   Final Result   Successful ultrasound guided thoracentesis. XR CHEST PORTABLE   Final Result   Worsening right lower lobe infiltrate and effusion         XR CHEST PORTABLE   Final Result   Right lower lobe opacity possibly representing atelectasis. CT CHEST W CONTRAST   Final Result   1. Interlobular septal thickening in the upper lungs, bilateral pleural   effusions greater on the right, 4 chamber cardiac enlargement, pericardial   effusion and hepatosplenomegaly. Trace right upper quadrant perihepatic   ascites. Findings suggest volume overload/CHF. 2.  Partial passive atelectasis of the lower lobes, right middle lobe and   lingular segment. 3.  Prominent main pulmonary artery, consider pulmonary artery hypertension. No filling defects in the pulmonary artery to suggest embolism. 4.  Postop changes in the right axilla and right breast.         XR CHEST (2 VW)   Final Result   Cardiomegaly with persistent interstitial and alveolar pulmonary edema which   is most pronounced on the right. PROBLEM LIST:  Principal Problem:    COPD exacerbation ([address]  Active Problems:    Acute on chronic respiratory failure with hypercapnia (HCC)    Persistent atrial fibrillation (HCC)  Resolved Problems:    * No resolved hospital problems.  *      IMPRESSION:  Acute, superimposed on chronic hypoxemic and hypercapnic respiratory failure  Morbid obesity  Obstructive sleep apnea  Alveolar hypoventilation syndrome  Diabetes mellitus type 2  HFpEF  Possible right lower lobe pneumonia  Severe pulmonary hypertension with an element of right heart failure  Atrial fibrillation on Eliquis with controlled ventricular response  Chronic kidney disease  Small bilateral pleural effusions (transudative right-sided pleural effusion on October 18, 2022)  Severe COPD with exacerbation  Cor pulmonale    PLAN:  Discontinue antibiotics  BiPAP as needed and nightly  Continue inhaled bronchodilators   Chest x-ray 10/17/22 showed worsening right-sided pleural effusion. Effusion improved on the chest x-ray on October 18, 2022. PT and OT are essential and will continue  Eliquis was resumed. thoracentesis was completed on October 18, 2022 520 cc removed. Patient had a thoracentesis in August 26, 2022. Wean FiO2 as tolerated and assess the need for home oxygen before discharge  Patient can be discharged from pulmonary point of view.       Electronically signed by Paulo Kohli MD on 10/21/2022 at 11:49 AM

## 2022-10-22 VITALS
DIASTOLIC BLOOD PRESSURE: 65 MMHG | RESPIRATION RATE: 18 BRPM | SYSTOLIC BLOOD PRESSURE: 123 MMHG | OXYGEN SATURATION: 100 % | WEIGHT: 220 LBS | BODY MASS INDEX: 43.19 KG/M2 | TEMPERATURE: 97.7 F | HEART RATE: 78 BPM | HEIGHT: 60 IN

## 2022-10-22 LAB
METER GLUCOSE: 157 MG/DL (ref 74–99)
METER GLUCOSE: 253 MG/DL (ref 74–99)

## 2022-10-22 PROCEDURE — 6370000000 HC RX 637 (ALT 250 FOR IP): Performed by: FAMILY MEDICINE

## 2022-10-22 PROCEDURE — 6370000000 HC RX 637 (ALT 250 FOR IP): Performed by: NURSE PRACTITIONER

## 2022-10-22 PROCEDURE — 94640 AIRWAY INHALATION TREATMENT: CPT

## 2022-10-22 PROCEDURE — 6370000000 HC RX 637 (ALT 250 FOR IP): Performed by: INTERNAL MEDICINE

## 2022-10-22 PROCEDURE — 82962 GLUCOSE BLOOD TEST: CPT

## 2022-10-22 PROCEDURE — 2700000000 HC OXYGEN THERAPY PER DAY

## 2022-10-22 PROCEDURE — 94660 CPAP INITIATION&MGMT: CPT

## 2022-10-22 RX ADMIN — SUCRALFATE 1 G: 1 TABLET ORAL at 08:43

## 2022-10-22 RX ADMIN — APIXABAN 5 MG: 5 TABLET, FILM COATED ORAL at 08:43

## 2022-10-22 RX ADMIN — INSULIN GLARGINE 18 UNITS: 100 INJECTION, SOLUTION SUBCUTANEOUS at 08:45

## 2022-10-22 RX ADMIN — IPRATROPIUM BROMIDE AND ALBUTEROL SULFATE 1 AMPULE: .5; 2.5 SOLUTION RESPIRATORY (INHALATION) at 06:01

## 2022-10-22 RX ADMIN — ROPINIROLE HYDROCHLORIDE 1 MG: 1 TABLET, FILM COATED ORAL at 08:43

## 2022-10-22 RX ADMIN — CARBIDOPA AND LEVODOPA 2 TABLET: 25; 100 TABLET, EXTENDED RELEASE ORAL at 08:00

## 2022-10-22 RX ADMIN — PREDNISONE 15 MG: 5 TABLET ORAL at 08:43

## 2022-10-22 RX ADMIN — PANTOPRAZOLE SODIUM 40 MG: 40 TABLET, DELAYED RELEASE ORAL at 09:29

## 2022-10-22 RX ADMIN — METOPROLOL SUCCINATE 100 MG: 50 TABLET, EXTENDED RELEASE ORAL at 08:43

## 2022-10-22 RX ADMIN — FUROSEMIDE 40 MG: 40 TABLET ORAL at 08:43

## 2022-10-22 ASSESSMENT — PAIN SCALES - WONG BAKER: WONGBAKER_NUMERICALRESPONSE: 0

## 2022-10-22 ASSESSMENT — PAIN SCALES - GENERAL: PAINLEVEL_OUTOF10: 0

## 2022-10-22 NOTE — PROGRESS NOTES
10/22/2022  [id number]  0515/0515-02      Patient unavailable for physical therapy treatment due to declined due to preparing for discharge home soon.          Ed Gan PTA  LIC# [id number]

## 2022-10-22 NOTE — PLAN OF CARE
Problem: Discharge Planning  Goal: Discharge to home or other facility with appropriate resources  10/22/2022 1049 by Arielle Espinosa RN  Outcome: Completed  Flowsheets (Taken 10/22/2022 0900 by Dre Ferrer, RN)  Discharge to home or other facility with appropriate resources: Identify barriers to discharge with patient and caregiver     Problem: Safety - Adult  Goal: Free from fall injury  10/22/2022 1049 by Arielle Espinosa RN  Outcome: Completed  Flowsheets (Taken 10/22/2022 1048 by Dre Ferrer, RN)  Free From Fall Injury: Instruct family/caregiver on patient safety     Problem: ABCDS Injury Assessment  Goal: Absence of physical injury  10/22/2022 1049 by Arielle Espinosa RN  Outcome: Completed  Flowsheets (Taken 10/22/2022 1048 by Dre Ferrer, RN)  Absence of Physical Injury: Implement safety measures based on patient assessment     Problem: Skin/Tissue Integrity  Goal: Absence of new skin breakdown  Description: 1. Monitor for areas of redness and/or skin breakdown  2. Assess vascular access sites hourly  3. Every 4-6 hours minimum:  Change oxygen saturation probe site  4. Every 4-6 hours:  If on nasal continuous positive airway pressure, respiratory therapy assess nares and determine need for appliance change or resting period.   10/22/2022 1049 by Arielle Espinosa RN  Outcome: Completed     Problem: Chronic Conditions and Co-morbidities  Goal: Patient's chronic conditions and co-morbidity symptoms are monitored and maintained or improved  10/22/2022 1049 by Arielle Espinosa RN  Outcome: Completed  Flowsheets (Taken 10/22/2022 0900 by Dre Ferrer, RN)  Care Plan - Patient's Chronic Conditions and Co-Morbidity Symptoms are Monitored and Maintained or Improved: Monitor and assess patient's chronic conditions and comorbid symptoms for stability, deterioration, or improvement     Problem: Pain  Goal: Verbalizes/displays adequate comfort level or baseline comfort level  10/22/2022 1049 by Rafael Mendez RN  Outcome: Completed

## 2022-10-22 NOTE — CARE COORDINATION
Ss note: 10/22/62959:42 AM Call placed to [facility] to confirm w/c transport that was arranged by ZACHERY Grimes yesterday for this morning for pt to transfer to [address] [place] [address] ([place] [id number]) at 10:30 am. Joann at [facility] states a trip was not scheduled.  KRISHNA arranged W/C TRANSPORT WITH [facility] TODAY FOR 11:30 AM.  ESTHER Marcum

## 2022-10-25 ENCOUNTER — HOSPITAL ENCOUNTER (OUTPATIENT)
Dept: OTHER | Age: 72
Setting detail: THERAPIES SERIES
Discharge: HOME | End: 2022-10-25

## 2022-10-25 NOTE — PROGRESS NOTES
9: 32 AM   10/25/2022     [facility]ed [facility] and transportation not arranged for CHF clinic.  Rescheduled for:       Future Appointments   Date Time Provider Prateek Nassar   11/3/2022  2:00 PM [facility] CHF ROOM 1 [facility] [address] [facility]   11/16/2022  2:30 PM Lawyer Boeck, MD [address] [place] [address]

## 2022-11-06 ENCOUNTER — HOSPITAL ENCOUNTER (INPATIENT)
Age: 72
LOS: 6 days | Discharge: HOME | DRG: 291 | End: 2022-11-12
Attending: EMERGENCY MEDICINE | Admitting: INTERNAL MEDICINE
Payer: MEDICARE

## 2022-11-06 ENCOUNTER — APPOINTMENT (OUTPATIENT)
Dept: GENERAL RADIOLOGY | Age: 72
DRG: 291 | End: 2022-11-06
Payer: MEDICARE

## 2022-11-06 DIAGNOSIS — J96.22 ACUTE ON CHRONIC RESPIRATORY FAILURE WITH HYPERCAPNIA (HCC): Primary | ICD-10-CM

## 2022-11-06 DIAGNOSIS — J44.1 COPD EXACERBATION (HCC): ICD-10-CM

## 2022-11-06 PROBLEM — J96.20 ACUTE ON CHRONIC RESPIRATORY FAILURE (HCC): Status: ACTIVE | Noted: 2022-01-01

## 2022-11-06 PROBLEM — J96.92 HYPERCAPNIC RESPIRATORY FAILURE (HCC): Status: ACTIVE | Noted: 2022-11-06

## 2022-11-06 LAB
AADO2: 196.6 MMHG
ANION GAP SERPL CALCULATED.3IONS-SCNC: 9 MMOL/L (ref 7–16)
ANISOCYTOSIS: ABNORMAL
B.E.: 4.9 MMOL/L (ref -3–3)
B.E.: 7.4 MMOL/L (ref -3–3)
BACTERIA: NORMAL /HPF
BASOPHILIC STIPPLING: ABNORMAL
BASOPHILS ABSOLUTE: 0.02 E9/L (ref 0–0.2)
BASOPHILS RELATIVE PERCENT: 0.3 % (ref 0–2)
BILIRUBIN URINE: NEGATIVE
BLOOD, URINE: NEGATIVE
BUN BLDV-MCNC: 61 MG/DL (ref 6–23)
CALCIUM SERPL-MCNC: 8.9 MG/DL (ref 8.6–10.2)
CHLORIDE BLD-SCNC: 96 MMOL/L (ref 98–107)
CLARITY: CLEAR
CO2: 36 MMOL/L (ref 22–29)
COHB: 0.1 % (ref 0–1.5)
COHB: 0.2 % (ref 0–1.5)
COLOR: YELLOW
COMMENT: ABNORMAL
CREAT SERPL-MCNC: 1.9 MG/DL (ref 0.5–1)
CRITICAL: ABNORMAL
CRITICAL: ABNORMAL
DATE ANALYZED: ABNORMAL
DATE ANALYZED: ABNORMAL
DATE OF COLLECTION: ABNORMAL
DATE OF COLLECTION: ABNORMAL
EOSINOPHILS ABSOLUTE: 0.16 E9/L (ref 0.05–0.5)
EOSINOPHILS RELATIVE PERCENT: 2.2 % (ref 0–6)
EPITHELIAL CELLS, UA: NORMAL /HPF
FIO2: 100 %
GFR SERPL CREATININE-BSD FRML MDRD: 28 ML/MIN/1.73
GLUCOSE BLD-MCNC: 213 MG/DL (ref 74–99)
GLUCOSE URINE: NEGATIVE MG/DL
HCO3: 33.8 MMOL/L (ref 22–26)
HCO3: 34.1 MMOL/L (ref 22–26)
HCT VFR BLD CALC: 34.6 % (ref 34–48)
HEMOGLOBIN: 9.5 G/DL (ref 11.5–15.5)
HHB: 0.8 % (ref 0–5)
HHB: 3.6 % (ref 0–5)
IMMATURE GRANULOCYTES #: 0.03 E9/L
IMMATURE GRANULOCYTES %: 0.4 % (ref 0–5)
INFLUENZA A BY PCR: NOT DETECTED
INFLUENZA B BY PCR: NOT DETECTED
KETONES, URINE: NEGATIVE MG/DL
LAB: ABNORMAL
LAB: ABNORMAL
LEUKOCYTE ESTERASE, URINE: NEGATIVE
LYMPHOCYTES ABSOLUTE: 1.06 E9/L (ref 1.5–4)
LYMPHOCYTES RELATIVE PERCENT: 14.9 % (ref 20–42)
Lab: ABNORMAL
Lab: ABNORMAL
MCH RBC QN AUTO: 26.2 PG (ref 26–35)
MCHC RBC AUTO-ENTMCNC: 27.5 % (ref 32–34.5)
MCV RBC AUTO: 95.3 FL (ref 80–99.9)
METHB: 0.3 % (ref 0–1.5)
METHB: 0.4 % (ref 0–1.5)
MODE: ABNORMAL
MODE: ABNORMAL
MONOCYTES ABSOLUTE: 0.38 E9/L (ref 0.1–0.95)
MONOCYTES RELATIVE PERCENT: 5.3 % (ref 2–12)
NEUTROPHILS ABSOLUTE: 5.47 E9/L (ref 1.8–7.3)
NEUTROPHILS RELATIVE PERCENT: 76.9 % (ref 43–80)
NITRITE, URINE: NEGATIVE
O2 CONTENT: 14.4 ML/DL
O2 CONTENT: 15.9 ML/DL
O2 SATURATION: 96.4 % (ref 92–98.5)
O2 SATURATION: 99.2 % (ref 92–98.5)
O2HB: 95.9 % (ref 94–97)
O2HB: 98.7 % (ref 94–97)
OPERATOR ID: 1821
OPERATOR ID: 301
OVALOCYTES: ABNORMAL
PATIENT TEMP: 37 C
PATIENT TEMP: 37 C
PCO2: 58.1 MMHG (ref 35–45)
PCO2: 80.3 MMHG (ref 35–45)
PDW BLD-RTO: 15.8 FL (ref 11.5–15)
PEEP/CPAP: 8 CMH2O
PFO2: 4.33 MMHG/%
PH BLOOD GAS: 7.25 (ref 7.35–7.45)
PH BLOOD GAS: 7.38 (ref 7.35–7.45)
PH UA: 5.5 (ref 5–9)
PLATELET # BLD: 168 E9/L (ref 130–450)
PMV BLD AUTO: 11.3 FL (ref 7–12)
PO2: 433.3 MMHG (ref 75–100)
PO2: 98.2 MMHG (ref 75–100)
POIKILOCYTES: ABNORMAL
POLYCHROMASIA: ABNORMAL
POTASSIUM SERPL-SCNC: 5.1 MMOL/L (ref 3.5–5)
PRO-BNP: 9778 PG/ML (ref 0–125)
PROTEIN UA: NORMAL MG/DL
RBC # BLD: 3.63 E12/L (ref 3.5–5.5)
RBC UA: NORMAL /HPF (ref 0–2)
RI(T): 0.45
SARS-COV-2, NAAT: NOT DETECTED
SODIUM BLD-SCNC: 141 MMOL/L (ref 132–146)
SOURCE, BLOOD GAS: ABNORMAL
SOURCE, BLOOD GAS: ABNORMAL
SPECIFIC GRAVITY UA: 1.02 (ref 1–1.03)
T4 FREE: 0.67 NG/DL (ref 0.93–1.7)
TEAR DROP CELLS: ABNORMAL
THB: 10.6 G/DL (ref 11.5–16.5)
THB: 10.6 G/DL (ref 11.5–16.5)
TIME ANALYZED: 1322
TIME ANALYZED: 2010
TSH SERPL DL<=0.05 MIU/L-ACNC: 6.52 UIU/ML (ref 0.27–4.2)
UROBILINOGEN, URINE: 0.2 E.U./DL
WBC # BLD: 7.1 E9/L (ref 4.5–11.5)
WBC UA: NORMAL /HPF (ref 0–5)

## 2022-11-06 PROCEDURE — 87635 SARS-COV-2 COVID-19 AMP PRB: CPT

## 2022-11-06 PROCEDURE — 6360000002 HC RX W HCPCS: Performed by: INTERNAL MEDICINE

## 2022-11-06 PROCEDURE — 2580000003 HC RX 258

## 2022-11-06 PROCEDURE — 96360 HYDRATION IV INFUSION INIT: CPT

## 2022-11-06 PROCEDURE — 81001 URINALYSIS AUTO W/SCOPE: CPT

## 2022-11-06 PROCEDURE — 87502 INFLUENZA DNA AMP PROBE: CPT

## 2022-11-06 PROCEDURE — 36415 COLL VENOUS BLD VENIPUNCTURE: CPT

## 2022-11-06 PROCEDURE — 96361 HYDRATE IV INFUSION ADD-ON: CPT

## 2022-11-06 PROCEDURE — 84439 ASSAY OF FREE THYROXINE: CPT

## 2022-11-06 PROCEDURE — 84443 ASSAY THYROID STIM HORMONE: CPT

## 2022-11-06 PROCEDURE — 71045 X-RAY EXAM CHEST 1 VIEW: CPT

## 2022-11-06 PROCEDURE — 93005 ELECTROCARDIOGRAM TRACING: CPT | Performed by: INTERNAL MEDICINE

## 2022-11-06 PROCEDURE — 99223 1ST HOSP IP/OBS HIGH 75: CPT | Performed by: INTERNAL MEDICINE

## 2022-11-06 PROCEDURE — 82805 BLOOD GASES W/O2 SATURATION: CPT

## 2022-11-06 PROCEDURE — 99285 EMERGENCY DEPT VISIT HI MDM: CPT

## 2022-11-06 PROCEDURE — 80048 BASIC METABOLIC PNL TOTAL CA: CPT

## 2022-11-06 PROCEDURE — 2060000000 HC ICU INTERMEDIATE R&B

## 2022-11-06 PROCEDURE — 83880 ASSAY OF NATRIURETIC PEPTIDE: CPT

## 2022-11-06 PROCEDURE — 94660 CPAP INITIATION&MGMT: CPT

## 2022-11-06 PROCEDURE — 85025 COMPLETE CBC W/AUTO DIFF WBC: CPT

## 2022-11-06 RX ORDER — DIVALPROEX SODIUM 250 MG/1
250 TABLET, DELAYED RELEASE ORAL 2 TIMES DAILY
Status: DISCONTINUED | OUTPATIENT
Start: 2022-11-06 | End: 2022-11-12 | Stop reason: HOSPADM

## 2022-11-06 RX ORDER — INSULIN GLARGINE 100 [IU]/ML
13 INJECTION, SOLUTION SUBCUTANEOUS 2 TIMES DAILY
Status: DISCONTINUED | OUTPATIENT
Start: 2022-11-06 | End: 2022-11-12 | Stop reason: HOSPADM

## 2022-11-06 RX ORDER — IPRATROPIUM BROMIDE AND ALBUTEROL SULFATE 2.5; .5 MG/3ML; MG/3ML
1 SOLUTION RESPIRATORY (INHALATION)
Status: DISCONTINUED | OUTPATIENT
Start: 2022-11-07 | End: 2022-11-12 | Stop reason: HOSPADM

## 2022-11-06 RX ORDER — SODIUM CHLORIDE 9 MG/ML
INJECTION, SOLUTION INTRAVENOUS PRN
Status: DISCONTINUED | OUTPATIENT
Start: 2022-11-06 | End: 2022-11-12 | Stop reason: HOSPADM

## 2022-11-06 RX ORDER — NICOTINE POLACRILEX 4 MG
15 LOZENGE BUCCAL PRN
Status: ON HOLD | COMMUNITY
End: 2022-11-11 | Stop reason: HOSPADM

## 2022-11-06 RX ORDER — ONDANSETRON 4 MG/1
4 TABLET, ORALLY DISINTEGRATING ORAL EVERY 8 HOURS PRN
Status: DISCONTINUED | OUTPATIENT
Start: 2022-11-06 | End: 2022-11-12 | Stop reason: HOSPADM

## 2022-11-06 RX ORDER — SODIUM CHLORIDE 0.9 % (FLUSH) 0.9 %
5-40 SYRINGE (ML) INJECTION PRN
Status: DISCONTINUED | OUTPATIENT
Start: 2022-11-06 | End: 2022-11-12 | Stop reason: HOSPADM

## 2022-11-06 RX ORDER — SODIUM CHLORIDE 9 MG/ML
INJECTION, SOLUTION INTRAVENOUS
Status: COMPLETED
Start: 2022-11-06 | End: 2022-11-06

## 2022-11-06 RX ORDER — SODIUM CHLORIDE 0.9 % (FLUSH) 0.9 %
5-40 SYRINGE (ML) INJECTION EVERY 12 HOURS SCHEDULED
Status: DISCONTINUED | OUTPATIENT
Start: 2022-11-06 | End: 2022-11-12 | Stop reason: HOSPADM

## 2022-11-06 RX ORDER — ACETAMINOPHEN 325 MG/1
650 TABLET ORAL EVERY 6 HOURS PRN
Status: DISCONTINUED | OUTPATIENT
Start: 2022-11-06 | End: 2022-11-12 | Stop reason: HOSPADM

## 2022-11-06 RX ORDER — ATORVASTATIN CALCIUM 20 MG/1
20 TABLET, FILM COATED ORAL NIGHTLY
Status: DISCONTINUED | OUTPATIENT
Start: 2022-11-06 | End: 2022-11-12 | Stop reason: HOSPADM

## 2022-11-06 RX ORDER — DOCUSATE SODIUM 100 MG/1
100 CAPSULE, LIQUID FILLED ORAL 2 TIMES DAILY PRN
Status: DISCONTINUED | OUTPATIENT
Start: 2022-11-06 | End: 2022-11-12 | Stop reason: HOSPADM

## 2022-11-06 RX ORDER — PANTOPRAZOLE SODIUM 40 MG/1
40 TABLET, DELAYED RELEASE ORAL EVERY MORNING
Status: DISCONTINUED | OUTPATIENT
Start: 2022-11-07 | End: 2022-11-12 | Stop reason: HOSPADM

## 2022-11-06 RX ORDER — ONDANSETRON 2 MG/ML
4 INJECTION INTRAMUSCULAR; INTRAVENOUS EVERY 6 HOURS PRN
Status: DISCONTINUED | OUTPATIENT
Start: 2022-11-06 | End: 2022-11-12 | Stop reason: HOSPADM

## 2022-11-06 RX ORDER — ENOXAPARIN SODIUM 100 MG/ML
30 INJECTION SUBCUTANEOUS DAILY
Status: DISCONTINUED | OUTPATIENT
Start: 2022-11-07 | End: 2022-11-07

## 2022-11-06 RX ORDER — ASPIRIN 81 MG/1
81 TABLET ORAL DAILY
Status: DISCONTINUED | OUTPATIENT
Start: 2022-11-07 | End: 2022-11-07

## 2022-11-06 RX ORDER — IPRATROPIUM BROMIDE AND ALBUTEROL SULFATE 2.5; .5 MG/3ML; MG/3ML
1 SOLUTION RESPIRATORY (INHALATION) 4 TIMES DAILY
Status: DISCONTINUED | OUTPATIENT
Start: 2022-11-06 | End: 2022-11-06 | Stop reason: SDUPTHER

## 2022-11-06 RX ORDER — BUDESONIDE 0.5 MG/2ML
0.5 INHALANT ORAL 2 TIMES DAILY
Status: DISCONTINUED | OUTPATIENT
Start: 2022-11-06 | End: 2022-11-12 | Stop reason: HOSPADM

## 2022-11-06 RX ORDER — FUROSEMIDE 20 MG/1
20 TABLET ORAL DAILY
Status: ON HOLD | COMMUNITY
End: 2022-11-11 | Stop reason: HOSPADM

## 2022-11-06 RX ORDER — CARBIDOPA AND LEVODOPA 25; 100 MG/1; MG/1
2 TABLET, EXTENDED RELEASE ORAL 3 TIMES DAILY
Status: DISCONTINUED | OUTPATIENT
Start: 2022-11-06 | End: 2022-11-12 | Stop reason: HOSPADM

## 2022-11-06 RX ORDER — ACETAMINOPHEN 325 MG/1
650 TABLET ORAL EVERY 4 HOURS PRN
Status: DISCONTINUED | OUTPATIENT
Start: 2022-11-06 | End: 2022-11-12 | Stop reason: HOSPADM

## 2022-11-06 RX ORDER — ACETAMINOPHEN 650 MG/1
650 SUPPOSITORY RECTAL EVERY 6 HOURS PRN
Status: DISCONTINUED | OUTPATIENT
Start: 2022-11-06 | End: 2022-11-12 | Stop reason: HOSPADM

## 2022-11-06 RX ORDER — ROPINIROLE 1 MG/1
1 TABLET, FILM COATED ORAL 3 TIMES DAILY
Status: DISCONTINUED | OUTPATIENT
Start: 2022-11-06 | End: 2022-11-12 | Stop reason: HOSPADM

## 2022-11-06 RX ORDER — POLYETHYLENE GLYCOL 3350 17 G/17G
17 POWDER, FOR SOLUTION ORAL DAILY PRN
Status: DISCONTINUED | OUTPATIENT
Start: 2022-11-06 | End: 2022-11-12 | Stop reason: HOSPADM

## 2022-11-06 RX ORDER — DIGOXIN 0.25 MG/ML
250 INJECTION INTRAMUSCULAR; INTRAVENOUS EVERY 6 HOURS
Status: COMPLETED | OUTPATIENT
Start: 2022-11-06 | End: 2022-11-07

## 2022-11-06 RX ORDER — DIGOXIN 0.25 MG/ML
500 INJECTION INTRAMUSCULAR; INTRAVENOUS ONCE
Status: COMPLETED | OUTPATIENT
Start: 2022-11-06 | End: 2022-11-06

## 2022-11-06 RX ORDER — 0.9 % SODIUM CHLORIDE 0.9 %
500 INTRAVENOUS SOLUTION INTRAVENOUS ONCE
Status: COMPLETED | OUTPATIENT
Start: 2022-11-06 | End: 2022-11-06

## 2022-11-06 RX ORDER — SUCRALFATE 1 G/1
1 TABLET ORAL 4 TIMES DAILY
Status: DISCONTINUED | OUTPATIENT
Start: 2022-11-06 | End: 2022-11-12 | Stop reason: HOSPADM

## 2022-11-06 RX ADMIN — Medication 500 ML: at 10:51

## 2022-11-06 RX ADMIN — SODIUM CHLORIDE 500 ML: 9 INJECTION, SOLUTION INTRAVENOUS at 10:51

## 2022-11-06 RX ADMIN — DIGOXIN 500 MCG: 0.25 INJECTION INTRAMUSCULAR; INTRAVENOUS at 17:53

## 2022-11-06 ASSESSMENT — ENCOUNTER SYMPTOMS
ABDOMINAL PAIN: 0
SHORTNESS OF BREATH: 1
BLOOD IN STOOL: 0
CONSTIPATION: 0
NAUSEA: 0
SINUS PRESSURE: 0
ABDOMINAL DISTENTION: 1
COUGH: 0
CHEST TIGHTNESS: 0
VOMITING: 0
DIARRHEA: 0

## 2022-11-06 ASSESSMENT — PAIN SCALES - WONG BAKER: WONGBAKER_NUMERICALRESPONSE: 0

## 2022-11-06 ASSESSMENT — LIFESTYLE VARIABLES: HOW OFTEN DO YOU HAVE A DRINK CONTAINING ALCOHOL: NEVER

## 2022-11-06 ASSESSMENT — PAIN - FUNCTIONAL ASSESSMENT
PAIN_FUNCTIONAL_ASSESSMENT: NONE - DENIES PAIN
PAIN_FUNCTIONAL_ASSESSMENT: ACTIVITIES ARE NOT PREVENTED

## 2022-11-06 ASSESSMENT — PAIN SCALES - GENERAL: PAINLEVEL_OUTOF10: 0

## 2022-11-06 NOTE — ED PROVIDER NOTES
HPI   76-year-old female with past medical history for congestive heart failure, atrial fibrillation, COPD, coronary artery disease, diabetes mellitus presenting to the emergency department with chief complaint of shortness of breath. Patient states that the symptoms started just last night and have been slightly improved since but have been relatively constant otherwise describing it as moderate in severity. She did not take anything in particular became quite short of breath and has been having difficulty breathing since that time. She also has been having some left flank discomfort that started yesterday. Patient does not have any cough. She does state that she has more abdominal distention than normal and some worsening lower leg swelling than normal. She has not noticed anything that makes the symptoms better or worse. Patient denies any fever, chills, nausea, vomiting, chest pain, abdominal pain, hematuria or dysuria, constipation or diarrhea. Review of Systems   Constitutional:  Negative for chills, fatigue and fever. HENT:  Negative for congestion, sinus pressure and tinnitus. Respiratory:  Positive for shortness of breath. Negative for cough and chest tightness. Cardiovascular:  Positive for leg swelling. Negative for chest pain. Gastrointestinal:  Positive for abdominal distention. Negative for abdominal pain, blood in stool, constipation, diarrhea, nausea and vomiting. Endocrine: Negative for polydipsia and polyphagia. Genitourinary:  Positive for flank pain (left flank discomfort). Negative for difficulty urinating and frequency. Skin:  Negative for rash and wound. Neurological:  Positive for weakness. Negative for light-headedness, numbness and headaches. Psychiatric/Behavioral:  Negative for agitation and confusion. Physical Exam  Vitals and nursing note reviewed. Constitutional:       General: She is not in acute distress. Appearance: Normal appearance.  She is not ill-appearing, toxic-appearing or diaphoretic. HENT:      Head: Normocephalic and atraumatic. Eyes:      Pupils: Pupils are equal, round, and reactive to light. Cardiovascular:      Rate and Rhythm: Normal rate and regular rhythm. Pulses: Normal pulses. Heart sounds: Normal heart sounds. No murmur heard. No friction rub. No gallop. Pulmonary:      Effort: No tachypnea, bradypnea, accessory muscle usage or respiratory distress. Breath sounds: Normal breath sounds. No stridor. No decreased breath sounds, wheezing, rhonchi or rales. Comments: Speaking in short sentences, appears short of breath, on 2L as baseline and O2 is 100%  Chest:      Chest wall: No mass, deformity, tenderness or crepitus. Abdominal:      General: Bowel sounds are normal. There is no distension. Palpations: Abdomen is soft. There is no mass. Tenderness: There is no abdominal tenderness. There is no guarding or rebound. Musculoskeletal:         General: No swelling, tenderness, deformity or signs of injury. Normal range of motion. Cervical back: No rigidity or tenderness. Right lower leg: No edema. Left lower leg: No edema. Skin:     General: Skin is warm and dry. Capillary Refill: Capillary refill takes less than 2 seconds. Coloration: Skin is not jaundiced or pale. Findings: No bruising, erythema, lesion or rash. Neurological:      General: No focal deficit present. Mental Status: She is alert and oriented to person, place, and time. Psychiatric:         Mood and Affect: Mood normal.         Behavior: Behavior normal.        Procedures     MDM     Amount and/or Complexity of Data Reviewed  Decide to obtain previous medical records or to obtain history from someone other than the patient: yes    78-year-old female presenting to the emergency department with chief complaint of shortness of breath.   Laboratory studies for CBC, BMP, urinalysis, BNP, COVID and influenza swabs were ordered. Imaging for chest x-ray was ordered and patient was given 500 ml bolus of normal saline for mild hypotension. Patient's potassium was 5.0 on and creatinine is 1.9 increased from previous of 0.9.  proBNP found to be 9778 and patient CBC is relatively unremarkable. Patient is negative for influenza and COVID and urinalysis does not indicate urinary tract infection at this time. Patient's ABG has a PCO2 of 80.3 with a pH of 7.246 indicating hypercapnic respiratory failure with CO2 retention and chest x-ray demonstrates small to moderate pleural effusion on the right with right lung base atelectasis. Patient was put on BiPAP at this time and has clinically improved. Decision made to admit the patient to the hospital at this time and spoke with Dr. Patrice Gutierrez who is amenable with this decision. Spoke with the patient and informed her of this and answered all of her questions as well as informing her of all laboratory and imaging findings. Patient has remained hemodynamically stable throughout the course of her emergency department stay and is admitted to the hospital.    See ED course for additional MDM. ED Course as of 11/06/22 1640   Sun Nov 06, 2022   1034 Patient evaluated at bedside at this time. [RW]   1459 Due to patient being on Eliquis and coming from community nursing home, patient receives anticoagulation routinely and on time and there is low suspicion for coagulation related disease causing patient's symptoms. [RW]   1035 EKG: Atrial fibrillation with RVR at a rate of 106 bpm with rightward axis deviation. QTC of 430. Compared to previous EKG there are no acute ST changes noted at this time. EKG interpreted by myself. [RW]   670 Riley Torres:     I have personally performed and/or participated in the history, exam, medical decision making, and procedures and agree with all pertinent clinical information unless otherwise noted.       I have also reviewed and agree with the past medical, family and social history unless otherwise noted. I have discussed this patient in detail with the resident, and provided the instruction and education regarding patient here for increasing shortness of breath, baseline lung history with known atrial fibrillation on Eliquis and uses oxygen at baseline. States she has had several days of nasal congestion with runny nose and coughing and increasing short of breath. Denies any chest pain or palpitations to me. No pain with breathing. No abdominal pain. No nausea vomiting or diarrhea. No lightheadedness or syncope. .  My findings/plan: Patient is an obese female lying in the bed in no acute distress, awake and alert and conversant with no focal neurologic deficit. Generally diminished breath sounds in all lung fields with some nasal congestion and runny nose noted. Heart rate regular. Abdomen soft and nontender. Arms and legs are neurovascular intact with no sign of acute bony or joint injury. Mild bilateral leg edema difficult to ascertain pitting secondary to body habitus with no unilateral swelling and no calf pain. [NC]   1040 EKG, atrial fibrillation, rate 106, right axis deviation. Normal conduction otherwise. No acute ischemic ST-T wave changes. Otherwise agree with resident. [NC]   9697 CRITICAL CARE:   30 MINUTES. Please note that the withdrawal or failure to initiate urgent interventions for this patient would likely result in a life threatening deterioration or permanent disability. Accordingly this patient received the above mentioned time, excluding separately billable procedures. Patient with hypercapnic respiratory failure with fluctuating blood pressures in ER ultimately requiring BiPAP. Acute renal failure also noted. Requiring admission.    [NC]      ED Course User Index  [NC] Jessica Dalton DO  [RW] Diane Muir DO --------------------------------------------- PAST HISTORY ---------------------------------------------  Past Medical History:  has a past medical history of A-fib ([facility] Acute on chronic congestive heart failure ([facility] Anxiety, Asthma, CAD (coronary artery disease), Cancer ([facility] Chronic kidney disease, COPD exacerbation ([facility] Depression, Diabetes mellitus ([facility] H/O mammogram, Hx MRSA infection, Hyperlipidemia, Hypertension, Lateral epicondylitis, Morbid obesity ([facility] SERENA on CPAP, Oxygen dependent, Parkinson's disease ([facility] and Tubal ligation status. Past Surgical History:  has a past surgical history that includes Gallbladder surgery; Toe amputation; Cholecystectomy; Colonoscopy (7/29/15); Endoscopy, colon, diagnostic (7/19/15); Upper gastrointestinal endoscopy; lumbar laminectomy; Tonsillectomy; Breast lumpectomy; Breast reduction surgery; Cardiac catheterization (4/28/2014); Cardiac catheterization (01/11/2022); CT GUIDED CHEST TUBE (7/8/2022); and Upper gastrointestinal endoscopy (N/A, 7/19/2022). Social History:  reports that she has never smoked. She has never used smokeless tobacco. She reports that she does not drink alcohol and does not use drugs. Family History: family history includes Cancer in her father and mother; Diabetes in her sister; Heart Disease in her sister; Other in her brother; Seizures in her son. The patients home medications have been reviewed.     Allergies: Ciprofloxacin and Codeine    -------------------------------------------------- RESULTS -------------------------------------------------    LABS:  Results for orders placed or performed during the hospital encounter of 11/06/22   COVID-19, Rapid    Specimen: Nasopharyngeal Swab   Result Value Ref Range    SARS-CoV-2, NAAT Not Detected Not Detected   RAPID INFLUENZA A/B ANTIGENS    Specimen: Nasopharyngeal   Result Value Ref Range    Influenza A by PCR Not Detected Not Detected    Influenza B by PCR Not Detected Not Detected   BMP   Result Value Ref Range    Sodium 141 132 - 146 mmol/L    Potassium 5.1 (H) 3.5 - 5.0 mmol/L    Chloride 96 (L) 98 - 107 mmol/L    CO2 36 (H) 22 - 29 mmol/L    Anion Gap 9 7 - 16 mmol/L    Glucose 213 (H) 74 - 99 mg/dL    BUN 61 (H) 6 - 23 mg/dL    Creatinine 1.9 (H) 0.5 - 1.0 mg/dL    Est, Glom Filt Rate 28 >=60 mL/min/1.73    Calcium 8.9 8.6 - 10.2 mg/dL   CBC with Auto Differential   Result Value Ref Range    WBC 7.1 4.5 - 11.5 E9/L    RBC 3.63 3.50 - 5.50 E12/L    Hemoglobin 9.5 (L) 11.5 - 15.5 g/dL    Hematocrit 34.6 34.0 - 48.0 %    MCV 95.3 80.0 - 99.9 fL    MCH 26.2 26.0 - 35.0 pg    MCHC 27.5 (L) 32.0 - 34.5 %    RDW 15.8 (H) 11.5 - 15.0 fL    Platelets 245 676 - 858 E9/L    MPV 11.3 7.0 - 12.0 fL    Neutrophils % 76.9 43.0 - 80.0 %    Immature Granulocytes % 0.4 0.0 - 5.0 %    Lymphocytes % 14.9 (L) 20.0 - 42.0 %    Monocytes % 5.3 2.0 - 12.0 %    Eosinophils % 2.2 0.0 - 6.0 %    Basophils % 0.3 0.0 - 2.0 %    Neutrophils Absolute 5.47 1.80 - 7.30 E9/L    Immature Granulocytes # 0.03 E9/L    Lymphocytes Absolute 1.06 (L) 1.50 - 4.00 E9/L    Monocytes Absolute 0.38 0.10 - 0.95 E9/L    Eosinophils Absolute 0.16 0.05 - 0.50 E9/L    Basophils Absolute 0.02 0.00 - 0.20 E9/L    Anisocytosis 1+     Polychromasia 1+     Poikilocytes 1+     Ovalocytes 1+     Tear Drop Cells 1+     Basophilic Stippling 1+    Urinalysis   Result Value Ref Range    Color, UA Yellow Straw/Yellow    Clarity, UA Clear Clear    Glucose, Ur Negative Negative mg/dL    Bilirubin Urine Negative Negative    Ketones, Urine Negative Negative mg/dL    Specific Gravity, UA 1.020 1.005 - 1.030    Blood, Urine Negative Negative    pH, UA 5.5 5.0 - 9.0    Protein, UA TRACE Negative mg/dL    Urobilinogen, Urine 0.2 <2.0 E.U./dL    Nitrite, Urine Negative Negative    Leukocyte Esterase, Urine Negative Negative   Brain Natriuretic Peptide   Result Value Ref Range    Pro-BNP 9,778 (H) 0 - 125 pg/mL   Microscopic Urinalysis Result Value Ref Range    WBC, UA 1-3 0 - 5 /HPF    RBC, UA NONE 0 - 2 /HPF    Epithelial Cells, UA FEW /HPF    Bacteria, UA NONE SEEN None Seen /HPF   Blood Gas, Arterial   Result Value Ref Range    Date Analyzed 20221106     Time Analyzed 1322     Source: Blood Arterial     pH, Blood Gas 7.246 (L) 7.350 - 7.450    PCO2 80.3 (HH) 35.0 - 45.0 mmHg    PO2 98.2 75.0 - 100.0 mmHg    HCO3 34.1 (H) 22.0 - 26.0 mmol/L    B.E. 4.9 (H) -3.0 - 3.0 mmol/L    O2 Sat 96.4 92.0 - 98.5 %    O2Hb 95.9 94.0 - 97.0 %    COHb 0.1 0.0 - 1.5 %    MetHb 0.4 0.0 - 1.5 %    O2 Content 14.4 mL/dL    HHb 3.6 0.0 - 5.0 %    tHb (est) 10.6 (L) 11.5 - 16.5 g/dL    Mode NC- 2 L     Comment wit readback     Date Of Collection      Time Collected      Pt Temp 37.0 C     ID [id number]     Lab [id number]     Critical(s) Notified Called to  dr. Sharion Crigler:  XR CHEST PORTABLE   Final Result   1. Small to moderate right pleural effusion   2. Right lung base atelectasis   3. Cardiomegaly             ------------------------- NURSING NOTES AND VITALS REVIEWED ---------------------------  Date / Time Roomed:  11/6/2022 10:01 AM  ED Bed Assignment:  08/08    The nursing notes within the ED encounter and vital signs as below have been reviewed.      Patient Vitals for the past 24 hrs:   BP Temp Temp src Pulse Resp SpO2 Height Weight   11/06/22 1524 92/77 -- -- 99 19 100 % -- --   11/06/22 1342 -- -- -- -- 25 -- -- --   11/06/22 1300 101/76 -- -- (!) 120 18 97 % -- --   11/06/22 1245 107/60 -- -- (!) 101 20 98 % -- --   11/06/22 1208 104/80 -- -- (!) 102 18 97 % -- --   11/06/22 1100 (!) 85/56 -- -- 100 18 94 % -- --   11/06/22 0948 89/64 97.9 °F (36.6 °C) Oral 96 18 97 % 5' (1.524 m) 213 lb (96.6 kg)       Oxygen Saturation Interpretation: Normal    ------------------------------------------ PROGRESS NOTES ------------------------------------------  Re-evaluation(s):  Multiple reevaluations were performed throughout the course of the patient's emergency department stay and she has remained clinically stable. Counseling:  I have spoken with the patient and discussed todays results, in addition to providing specific details for the plan of care and counseling regarding the diagnosis and prognosis. Their questions are answered at this time and they are agreeable with the plan of admission.    --------------------------------- ADDITIONAL PROVIDER NOTES ---------------------------------  Consultations:  Time: 1630. Spoke with Dr. Jose Coelho. Discussed case. They will admit the patient. This patient's ED course included: a personal history and physicial examination, re-evaluation prior to disposition, multiple bedside re-evaluations, IV medications, cardiac monitoring, continuous pulse oximetry, and complex medical decision making and emergency management    This patient has remained hemodynamically stable during their ED course. Diagnosis:  1. Acute on chronic respiratory failure with hypercapnia (HCC)    2. COPD exacerbation ([facility]        Disposition:  Patient's disposition: Admit to med/surg floor  Patient's condition is stable.          Saulo Queen DO  Resident  11/06/22 1640

## 2022-11-06 NOTE — H&P
Department of Internal Medicine  General Internal Medicine  Attending History and Physical      CHIEF COMPLAINT:  weakness and fatigue    Reason for Admission:  hypercapnea    History Obtained From:  patient, electronic medical record    HISTORY OF PRESENT ILLNESS:      The patient is a 67 y.o. female with significant past medical history of CHF, A. Fib, Hypertension Essential, Hx of Breast Ca who presents with weakness and lower extremity swelling. She noted that for the last 2 days she is feeling more tired this has progressively gotten worse started today. She was admitted to increased lower extremity edema. She denied fever chills no nausea no vomiting no cough. In the ER she was found to be lethargic. ABG was suggestive of hypercapnia. Her pH was 7.24, PCO2 was 80. She was placed on BiPAP and admitted for further evaluation. In the ER she was also noted to have irregular heartbeat with heart rate persistently above 100 with high of 130s and low of 104. Blood pressure was also low. She is being admitted for further management. EKG in the ER shows A. fib with RVR. Due to fluid overload and uncontrolled atrial fibrillation she was given loading dose of digoxin and additional 2 doses of every 6 hours after the initial dose. Patient to be admitted to [facility].   Blood pressure currently holding at around 100 over 60s    Past Medical History:        Diagnosis Date    A-fib [facility])     Acute on chronic congestive heart failure (HCC)     Anxiety     Asthma     CAD (coronary artery disease) 01/21/2016    Cancer ([address]  breast ca 2006    right lumpectomy    Chronic kidney disease     nephrolithiasis    COPD exacerbation ([address] 10/12/2022    Depression     Diabetes mellitus ([address]     H/O mammogram     Hx MRSA infection     toe infection january 2012    Hyperlipidemia     Hypertension     Lateral epicondylitis     Morbid obesity (HCC)     SERENA on CPAP     Oxygen dependent     Parkinson's disease ([address]     Tubal ligation status      Past Surgical History:        Procedure Laterality Date    BREAST LUMPECTOMY      BREAST REDUCTION SURGERY      CARDIAC CATHETERIZATION  4/28/2014    Dr. Nirmala Melo  01/11/2022    Dr. Chanda Flores  7/29/15    CT GUIDED CHEST TUBE  7/8/2022    CT GUIDED CHEST TUBE 7/8/2022 Caron Espinoza MD [facility]    ENDOSCOPY, COLON, DIAGNOSTIC  7/19/15    GALLBLADDER SURGERY      LUMBAR LAMINECTOMY      TOE AMPUTATION      TONSILLECTOMY      UPPER GASTROINTESTINAL ENDOSCOPY      UPPER GASTROINTESTINAL ENDOSCOPY N/A 7/19/2022    EGD ESOPHAGOGASTRODUODENOSCOPY performed by Randy Dominguez MD at [address]     Medications Prior to Admission:    Aspirin  Symbicort  Depakote  Amiodarone  Lantus  Loperamide  Mirtazapine  Ambien  Sinemet  Requip  Metoprolol  Lasix  Colace      Allergies:  Ciprofloxacin and Codeine    Social History:   TOBACCO:   reports that she has never smoked.  She has never used smokeless tobacco.    Family History:       Problem Relation Age of Onset    Cancer Mother         Lung Ca    Cancer Father         lung Ca    Diabetes Sister     Heart Disease Sister     Seizures Son     Other Brother         sepsis     REVIEW OF SYSTEMS:  CONSTITUTIONAL:  positive for  fatigue  EYES:  negative for  double vision and blind spots  HEENT:  negative for  hearing loss, earaches, and epistaxis  RESPIRATORY:  positive for  dyspnea  CARDIOVASCULAR:  negative for  chest pain  GASTROINTESTINAL:  negative for nausea, change in bowel habits, and diarrhea  HEMATOLOGIC/LYMPHATIC:  negative for easy bruising  ENDOCRINE:  negative for heat intolerance and tremor  MUSCULOSKELETAL:  positive for lower extremity edema  NEUROLOGICAL:  negative for headaches and speech problems  BEHAVIOR/PSYCH:  negative for poor appetite and increased sleep  PHYSICAL EXAM:    Vitals:  /68   Pulse (!) 106   Temp 97.9 °F (36.6 °C) (Oral)   Resp 25   Ht 5' (1.524 m)   Wt 213 lb again. Acute decompensated diastolic heart failure: Unfortunately patient's blood pressure is low to tolerate diuretics for now. BiPAP as noted above. Strict intake and output. If blood pressure allows will start diuresis. Possible echo but will defer this to cardiology. Cardiology already consulted. Atrial fibrillation with RVR: Rate poorly controlled. Patient on amiodarone at home. Hold amiodarone for now. Concerned that side effects may be related to progressive worsening of TSH. Due to episode of hypotension, will give loading dose of digoxin for now and this will help to support blood pressure if rate is better controlled. Also appreciate cardiology recommendation. Continue to hold metoprolol. SERENA: BiPAP as ordered. Morbid obesity: Lifestyle modification. History of COPD: Not in exacerbation. Diabetes mellitus type 2: Continue current insulin as ordered. Accu-Chek ACH S.  Diabetic diet also. Hyperlipidemia: On statin  Intrathecal rash: Nystatin powder  Insomnia: Hold Ambien and other sedative due to hypercapnia.   History of dementia: Continue Sinemet

## 2022-11-06 NOTE — LETTER
[place] Department Medicaid  CERTIFICATION OF NECESSITY  FOR NON-EMERGENCY TRANSPORTATION   BY GROUND AMBULANCE      Individual Information   1. Name: Nikia Obregon 2. [place] Medicaid Billing Number:    3. Address: [place] [address]      Transportation Provider Information   4. Provider Name: TOÑO   5. [place] Medicaid Provider Number: [id number] National Provider Identifier (NPI): [id number]     Certification  7. Criteria:  During transport, this individual requires:  [x] Medical treatment or continuous     supervision by an EMT. [x] The administration or regulation of oxygen by another person. [] Supervised protective restraint. 8. Period Beginning Date: 11/11/2022   9. Length  [x] Not more than 1 day(s)  [] One Year     Additional Information Relevant to Certification   10. Comments or Explanations, If Necessary or Appropriate:  Debility r/t chronic CHF/resp failure, O2 dependence, recurrent syncope, Morbid obesity         Certifying Practitioner Information   11. Name of Practitioner: Dr Liu Purcell   12. [place] Medicaid Provider Number, If Applicable:  [address] Provider Identifier (NPI): [id number]     Signature Information   14. Date of Signature: 11/11/2022 15. Name of Person Signing: Leslie Snider RN   16. Signature and Professional Designation: Electronically signed by Leslie Snider RN on 11/11/2022 at 3:29 PM     OD 06922  Rev. 7/2015  [facility] Encounter Date/Time: 11/6/2022 [address] Account: [de-identified]    MRN: [id number]    Patient: Camilla Bingham Serial #: [id number]      ENCOUNTER          Patient Class: I Private Enc?   No Unit RM BD: Cherl Scale [address] [id number]   Hospital Service: MED   Encounter DX: COPD exacerbation ([facility] *   ADM Provider: Valarie Harvey MD   Procedure:     ATT Provider: Sanna Jewell MD   REF Provider:        Admission DX: COPD exacerbation ([address] Acute on chronic respiratory failure ([address] Acute on chronic respiratory failure with hypercapnia ([facility] Hypercapnic respiratory failure ([facility] and DX codes: J44.1, J96.20, J96.22, J96.92      PATIENT                 Name: Amina Mancini : 1950 (72 yrs)   Address: [facility]* Sex: Female   [place] city: [place] [address]         Marital Status:    Employer: RETIRED         Adventism: Denominational   Primary Care Provider: Karl Barbosa DO         Primary Phone: [phone]   EMERGENCY CONTACT   Contact Name Legal Guardian? Relationship to Patient Home Phone Work Phone   1. Tom Thomas  2. PedrozaLilliana No  No Child  Child [phone]              GUARANTOR            Guarantor: Amina Mancini     : 1950   Address: [address] [place] [address]* Sex: Female     [address] [address]     Relation to Patient: Self       Home Phone: [phone]   Guarantor ID: [id number]       Work Phone:     Guarantor Employer: RETIRED         Status: RETIRED      COVERAGE        PRIMARY INSURANCE   Payor: [facility] MEDICARE Plan: Kailyn Gonzalez CO*   Payor Address: ,          Group Number: [id number] Insurance Type: INDEMNITY   Subscriber Name: Audrey Best : 1950   Subscriber ID: [id number] Pat. Rel. to Sub: Self   SECONDARY INSURANCE   Payor: Emely Pun* Plan: Grant Sims M*   Payor Address:  [facility], [address] [facility]          Group Number: [id number] Insurance Type: INDEMNITY   Subscriber Name: Audrey Best : 1950   Subscriber ID: [id number] Pat.  Rel. to Sub: SELF         CSN: [id number]

## 2022-11-06 NOTE — ED NOTES
Report called to [address] [facility] on [address] [facility], RN  11/06/22 3476

## 2022-11-07 ENCOUNTER — HOSPITAL ENCOUNTER (OUTPATIENT)
Dept: OTHER | Age: 72
Setting detail: THERAPIES SERIES
Discharge: HOME | End: 2022-11-07

## 2022-11-07 LAB
AADO2: 150.8 MMHG
ALBUMIN SERPL-MCNC: 3.3 G/DL (ref 3.5–5.2)
ALP BLD-CCNC: 74 U/L (ref 35–104)
ALT SERPL-CCNC: <5 U/L (ref 0–32)
ANION GAP SERPL CALCULATED.3IONS-SCNC: 8 MMOL/L (ref 7–16)
AST SERPL-CCNC: 7 U/L (ref 0–31)
B.E.: 8.4 MMOL/L (ref -3–3)
BASOPHILS ABSOLUTE: 0 E9/L (ref 0–0.2)
BASOPHILS RELATIVE PERCENT: 0.1 % (ref 0–2)
BILIRUB SERPL-MCNC: 0.4 MG/DL (ref 0–1.2)
BUN BLDV-MCNC: 65 MG/DL (ref 6–23)
CALCIUM SERPL-MCNC: 8.8 MG/DL (ref 8.6–10.2)
CHLORIDE BLD-SCNC: 96 MMOL/L (ref 98–107)
CO2: 35 MMOL/L (ref 22–29)
COHB: 0 % (ref 0–1.5)
CREAT SERPL-MCNC: 1.9 MG/DL (ref 0.5–1)
CRITICAL: ABNORMAL
DATE ANALYZED: ABNORMAL
DATE OF COLLECTION: ABNORMAL
EOSINOPHILS ABSOLUTE: 0 E9/L (ref 0.05–0.5)
EOSINOPHILS RELATIVE PERCENT: 0.1 % (ref 0–6)
FIO2: 50 %
GFR SERPL CREATININE-BSD FRML MDRD: 28 ML/MIN/1.73
GLUCOSE BLD-MCNC: 181 MG/DL (ref 74–99)
HCO3: 35.6 MMOL/L (ref 22–26)
HCT VFR BLD CALC: 34.2 % (ref 34–48)
HEMOGLOBIN: 9.3 G/DL (ref 11.5–15.5)
HHB: 2.1 % (ref 0–5)
HYPOCHROMIA: ABNORMAL
LAB: ABNORMAL
LYMPHOCYTES ABSOLUTE: 1.06 E9/L (ref 1.5–4)
LYMPHOCYTES RELATIVE PERCENT: 13.9 % (ref 20–42)
Lab: ABNORMAL
MCH RBC QN AUTO: 25.3 PG (ref 26–35)
MCHC RBC AUTO-ENTMCNC: 27.2 % (ref 32–34.5)
MCV RBC AUTO: 93.2 FL (ref 80–99.9)
METAMYELOCYTES RELATIVE PERCENT: 0.9 % (ref 0–1)
METER GLUCOSE: 155 MG/DL (ref 74–99)
METER GLUCOSE: 176 MG/DL (ref 74–99)
METER GLUCOSE: 177 MG/DL (ref 74–99)
METER GLUCOSE: 193 MG/DL (ref 74–99)
METER GLUCOSE: 234 MG/DL (ref 74–99)
METHB: 0.3 % (ref 0–1.5)
MODE: ABNORMAL
MONOCYTES ABSOLUTE: 0.3 E9/L (ref 0.1–0.95)
MONOCYTES RELATIVE PERCENT: 3.5 % (ref 2–12)
NEUTROPHILS ABSOLUTE: 6.31 E9/L (ref 1.8–7.3)
NEUTROPHILS RELATIVE PERCENT: 81.7 % (ref 43–80)
O2 CONTENT: 14.5 ML/DL
O2 SATURATION: 97.9 % (ref 92–98.5)
O2HB: 97.6 % (ref 94–97)
OPERATOR ID: ABNORMAL
OVALOCYTES: ABNORMAL
PATIENT TEMP: 37 C
PCO2: 65 MMHG (ref 35–45)
PDW BLD-RTO: 15.4 FL (ref 11.5–15)
PEEP/CPAP: 6 CMH2O
PFO2: 2.4 MMHG/%
PH BLOOD GAS: 7.36 (ref 7.35–7.45)
PIP: 18 CMH2O
PLATELET # BLD: 193 E9/L (ref 130–450)
PMV BLD AUTO: 10.6 FL (ref 7–12)
PO2: 120.1 MMHG (ref 75–100)
POIKILOCYTES: ABNORMAL
POLYCHROMASIA: ABNORMAL
POTASSIUM REFLEX MAGNESIUM: 5.5 MMOL/L (ref 3.5–5)
RBC # BLD: 3.67 E12/L (ref 3.5–5.5)
RI(T): 1.26
SODIUM BLD-SCNC: 139 MMOL/L (ref 132–146)
SOURCE, BLOOD GAS: ABNORMAL
TEAR DROP CELLS: ABNORMAL
THB: 10.4 G/DL (ref 11.5–16.5)
TIME ANALYZED: 549
TOTAL PROTEIN: 5.3 G/DL (ref 6.4–8.3)
WBC # BLD: 7.6 E9/L (ref 4.5–11.5)

## 2022-11-07 PROCEDURE — 6360000002 HC RX W HCPCS

## 2022-11-07 PROCEDURE — 94660 CPAP INITIATION&MGMT: CPT

## 2022-11-07 PROCEDURE — 2580000003 HC RX 258: Performed by: INTERNAL MEDICINE

## 2022-11-07 PROCEDURE — 6360000002 HC RX W HCPCS: Performed by: INTERNAL MEDICINE

## 2022-11-07 PROCEDURE — 36415 COLL VENOUS BLD VENIPUNCTURE: CPT

## 2022-11-07 PROCEDURE — 82805 BLOOD GASES W/O2 SATURATION: CPT

## 2022-11-07 PROCEDURE — 85025 COMPLETE CBC W/AUTO DIFF WBC: CPT

## 2022-11-07 PROCEDURE — 6370000000 HC RX 637 (ALT 250 FOR IP)

## 2022-11-07 PROCEDURE — 6370000000 HC RX 637 (ALT 250 FOR IP): Performed by: INTERNAL MEDICINE

## 2022-11-07 PROCEDURE — 92610 EVALUATE SWALLOWING FUNCTION: CPT

## 2022-11-07 PROCEDURE — 99232 SBSQ HOSP IP/OBS MODERATE 35: CPT | Performed by: INTERNAL MEDICINE

## 2022-11-07 PROCEDURE — 92526 ORAL FUNCTION THERAPY: CPT

## 2022-11-07 PROCEDURE — 36600 WITHDRAWAL OF ARTERIAL BLOOD: CPT

## 2022-11-07 PROCEDURE — 2580000003 HC RX 258

## 2022-11-07 PROCEDURE — 94640 AIRWAY INHALATION TREATMENT: CPT

## 2022-11-07 PROCEDURE — 99223 1ST HOSP IP/OBS HIGH 75: CPT | Performed by: INTERNAL MEDICINE

## 2022-11-07 PROCEDURE — 2500000003 HC RX 250 WO HCPCS: Performed by: INTERNAL MEDICINE

## 2022-11-07 PROCEDURE — 82962 GLUCOSE BLOOD TEST: CPT

## 2022-11-07 PROCEDURE — 80053 COMPREHEN METABOLIC PANEL: CPT

## 2022-11-07 PROCEDURE — APPSS60 APP SPLIT SHARED TIME 46-60 MINUTES

## 2022-11-07 PROCEDURE — 2060000000 HC ICU INTERMEDIATE R&B

## 2022-11-07 RX ORDER — DEXTROSE MONOHYDRATE 100 MG/ML
INJECTION, SOLUTION INTRAVENOUS CONTINUOUS PRN
Status: DISCONTINUED | OUTPATIENT
Start: 2022-11-07 | End: 2022-11-12 | Stop reason: HOSPADM

## 2022-11-07 RX ORDER — AMIODARONE HYDROCHLORIDE 200 MG/1
200 TABLET ORAL DAILY
Status: DISCONTINUED | OUTPATIENT
Start: 2022-11-07 | End: 2022-11-12 | Stop reason: HOSPADM

## 2022-11-07 RX ORDER — METHYLPREDNISOLONE SODIUM SUCCINATE 40 MG/ML
30 INJECTION, POWDER, LYOPHILIZED, FOR SOLUTION INTRAMUSCULAR; INTRAVENOUS EVERY 8 HOURS SCHEDULED
Status: DISCONTINUED | OUTPATIENT
Start: 2022-11-07 | End: 2022-11-11

## 2022-11-07 RX ADMIN — DIVALPROEX SODIUM 250 MG: 250 TABLET, DELAYED RELEASE ORAL at 00:03

## 2022-11-07 RX ADMIN — CARBIDOPA AND LEVODOPA 2 TABLET: 25; 100 TABLET, EXTENDED RELEASE ORAL at 15:33

## 2022-11-07 RX ADMIN — SUCRALFATE 1 G: 1 TABLET ORAL at 00:04

## 2022-11-07 RX ADMIN — ENOXAPARIN SODIUM 30 MG: 100 INJECTION SUBCUTANEOUS at 08:13

## 2022-11-07 RX ADMIN — ANTI-FUNGAL POWDER MICONAZOLE NITRATE TALC FREE 1 EACH: 1.42 POWDER TOPICAL at 00:02

## 2022-11-07 RX ADMIN — IPRATROPIUM BROMIDE AND ALBUTEROL SULFATE 1 AMPULE: .5; 2.5 SOLUTION RESPIRATORY (INHALATION) at 13:50

## 2022-11-07 RX ADMIN — ONDANSETRON 4 MG: 2 INJECTION INTRAMUSCULAR; INTRAVENOUS at 01:20

## 2022-11-07 RX ADMIN — INSULIN GLARGINE 13 UNITS: 100 INJECTION, SOLUTION SUBCUTANEOUS at 08:21

## 2022-11-07 RX ADMIN — AZITHROMYCIN DIHYDRATE 500 MG: 500 INJECTION, POWDER, LYOPHILIZED, FOR SOLUTION INTRAVENOUS at 00:24

## 2022-11-07 RX ADMIN — BUDESONIDE 500 MCG: 0.5 SUSPENSION RESPIRATORY (INHALATION) at 06:56

## 2022-11-07 RX ADMIN — AMIODARONE HYDROCHLORIDE 200 MG: 200 TABLET ORAL at 17:42

## 2022-11-07 RX ADMIN — ATORVASTATIN CALCIUM 20 MG: 20 TABLET, FILM COATED ORAL at 20:37

## 2022-11-07 RX ADMIN — METHYLPREDNISOLONE SODIUM SUCCINATE 30 MG: 40 INJECTION, POWDER, FOR SOLUTION INTRAMUSCULAR; INTRAVENOUS at 17:41

## 2022-11-07 RX ADMIN — ATORVASTATIN CALCIUM 20 MG: 20 TABLET, FILM COATED ORAL at 00:04

## 2022-11-07 RX ADMIN — DIVALPROEX SODIUM 250 MG: 250 TABLET, DELAYED RELEASE ORAL at 20:37

## 2022-11-07 RX ADMIN — ROPINIROLE 1 MG: 1 TABLET, FILM COATED ORAL at 20:37

## 2022-11-07 RX ADMIN — IPRATROPIUM BROMIDE AND ALBUTEROL SULFATE 1 AMPULE: .5; 2.5 SOLUTION RESPIRATORY (INHALATION) at 10:02

## 2022-11-07 RX ADMIN — CARBIDOPA AND LEVODOPA 2 TABLET: 25; 100 TABLET, EXTENDED RELEASE ORAL at 20:37

## 2022-11-07 RX ADMIN — METHYLPREDNISOLONE SODIUM SUCCINATE 30 MG: 40 INJECTION, POWDER, FOR SOLUTION INTRAMUSCULAR; INTRAVENOUS at 20:40

## 2022-11-07 RX ADMIN — ASPIRIN 81 MG: 81 TABLET, COATED ORAL at 08:08

## 2022-11-07 RX ADMIN — SODIUM CHLORIDE, PRESERVATIVE FREE 10 ML: 5 INJECTION INTRAVENOUS at 08:18

## 2022-11-07 RX ADMIN — INSULIN GLARGINE 13 UNITS: 100 INJECTION, SOLUTION SUBCUTANEOUS at 20:41

## 2022-11-07 RX ADMIN — SUCRALFATE 1 G: 1 TABLET ORAL at 20:37

## 2022-11-07 RX ADMIN — SUCRALFATE 1 G: 1 TABLET ORAL at 08:08

## 2022-11-07 RX ADMIN — FUROSEMIDE 40 MG: 10 INJECTION, SOLUTION INTRAMUSCULAR; INTRAVENOUS at 00:04

## 2022-11-07 RX ADMIN — PANTOPRAZOLE SODIUM 40 MG: 40 TABLET, DELAYED RELEASE ORAL at 08:09

## 2022-11-07 RX ADMIN — FUROSEMIDE 40 MG: 10 INJECTION, SOLUTION INTRAMUSCULAR; INTRAVENOUS at 20:37

## 2022-11-07 RX ADMIN — SUCRALFATE 1 G: 1 TABLET ORAL at 13:31

## 2022-11-07 RX ADMIN — INSULIN GLARGINE 13 UNITS: 100 INJECTION, SOLUTION SUBCUTANEOUS at 00:11

## 2022-11-07 RX ADMIN — ROPINIROLE 1 MG: 1 TABLET, FILM COATED ORAL at 15:31

## 2022-11-07 RX ADMIN — SUCRALFATE 1 G: 1 TABLET ORAL at 15:31

## 2022-11-07 RX ADMIN — APIXABAN 5 MG: 5 TABLET, FILM COATED ORAL at 20:37

## 2022-11-07 RX ADMIN — ROPINIROLE 1 MG: 1 TABLET, FILM COATED ORAL at 08:09

## 2022-11-07 RX ADMIN — ANTI-FUNGAL POWDER MICONAZOLE NITRATE TALC FREE 1 EACH: 1.42 POWDER TOPICAL at 20:40

## 2022-11-07 RX ADMIN — CARBIDOPA AND LEVODOPA 2 TABLET: 25; 100 TABLET, EXTENDED RELEASE ORAL at 08:12

## 2022-11-07 RX ADMIN — IPRATROPIUM BROMIDE AND ALBUTEROL SULFATE 1 AMPULE: .5; 2.5 SOLUTION RESPIRATORY (INHALATION) at 06:56

## 2022-11-07 RX ADMIN — BUDESONIDE 500 MCG: 0.5 SUSPENSION RESPIRATORY (INHALATION) at 18:40

## 2022-11-07 RX ADMIN — ANTI-FUNGAL POWDER MICONAZOLE NITRATE TALC FREE 1 EACH: 1.42 POWDER TOPICAL at 08:17

## 2022-11-07 RX ADMIN — DIGOXIN 250 MCG: 0.25 INJECTION INTRAMUSCULAR; INTRAVENOUS at 00:04

## 2022-11-07 RX ADMIN — CARBIDOPA AND LEVODOPA 2 TABLET: 25; 100 TABLET, EXTENDED RELEASE ORAL at 00:03

## 2022-11-07 RX ADMIN — SODIUM CHLORIDE, PRESERVATIVE FREE 10 ML: 5 INJECTION INTRAVENOUS at 20:37

## 2022-11-07 RX ADMIN — DIGOXIN 250 MCG: 0.25 INJECTION INTRAMUSCULAR; INTRAVENOUS at 08:13

## 2022-11-07 RX ADMIN — DOXYCYCLINE 100 MG: 100 INJECTION, POWDER, LYOPHILIZED, FOR SOLUTION INTRAVENOUS at 17:43

## 2022-11-07 RX ADMIN — IPRATROPIUM BROMIDE AND ALBUTEROL SULFATE 1 AMPULE: .5; 2.5 SOLUTION RESPIRATORY (INHALATION) at 18:40

## 2022-11-07 RX ADMIN — DIVALPROEX SODIUM 250 MG: 250 TABLET, DELAYED RELEASE ORAL at 08:12

## 2022-11-07 RX ADMIN — FUROSEMIDE 40 MG: 10 INJECTION, SOLUTION INTRAMUSCULAR; INTRAVENOUS at 08:13

## 2022-11-07 RX ADMIN — ROPINIROLE 1 MG: 1 TABLET, FILM COATED ORAL at 00:04

## 2022-11-07 ASSESSMENT — ENCOUNTER SYMPTOMS
COUGH: 1
ALLERGIC/IMMUNOLOGIC NEGATIVE: 1
EYES NEGATIVE: 1
GASTROINTESTINAL NEGATIVE: 1
SHORTNESS OF BREATH: 1

## 2022-11-07 ASSESSMENT — PAIN SCALES - WONG BAKER
WONGBAKER_NUMERICALRESPONSE: 0

## 2022-11-07 ASSESSMENT — PAIN SCALES - GENERAL: PAINLEVEL_OUTOF10: 0

## 2022-11-07 NOTE — CONSULTS
Inpatient Cardiology Consultation      Reason for Consult: CHF/A. fib    Consulting Physician: Dr. Marty Hodgson    Requesting Physician:  Tu Velazco MD    Date of Consultation: 11/7/2022    HISTORY OF PRESENT ILLNESS:   Patient is a 79-year-old female who is known to Dr. Cassie De La Torre. She was last seen inpatient 10/13/2022 by Dr. Marty Hodgson for CHF. PMHx: Nonobstructive CAD ([facility] 1/2022 Dr. Cassie De La Torre: LAD: 50% after diagonal branch, first diagonal 60% proximal, LCx AV groove branch 50% ostial narrowing, RCA no significant disease, EF 75%), chronic HFpEF (TTE 7/2022: EF 70 to 75%. Indeterminate diastolic function. NWMA. Mild concentric LVH. Biatrial dilation. Mild TR.  RVSP 60 mmHg), PAF Eliquis? (Last DCCV 5/2022), right-sided thoracentesis, morbid obesity, diabetes, CKD, COPD, hyperlipidemia, digoxin toxicity, hypertension, asthma, SERENA not compliant with CPAP, iron deficiency anemia, Parkinson's, depression, breast cancer with radiation therapy    HPI:  Patient presented to ER 11/6/2022 for shortness of breath that started the night prior. Patient also complaining of left flank pain, increased leg and abdominal swelling. Patient was hyperkalemic at 5 and SHANTANU of 1.9 from previous 0.9.  proBNP was 9778. Patient was negative for influenza/COVID. ABG revealed hypercapnic respiratory failure with CO2 retention and CXR showed small to moderate pleural effusion on the right with right lung base atelectasis. Patient was placed on BiPAP with clinical improvement. Patient was also found to be in A. fib RVR. Patient was admitted for acute respiratory failure with COPD exacerbation. Patient was started on empiric ATB. VS upon arrival 97.9, 18 respirations, 96 pulse, 89/64, 97% on 2 L nasal cannula.   Labs: Potassium 5.1, chloride 96, CO2 36, BUN 61, creatinine 1.9 (1.0-1.3), GFR 28, glucose 213, serum calcium 8.9, proBNP 9778 (10/12/2022: 7115), TSH 6.52, T4 0.67, WBC 7.1, H&H 9.5/34.6 (9.0-10.6), platelet 645, influenza/COVID not detected, UA noninfectious  ABG: This morning: pH 7.35, PCO2 80>58>65, PO2 120, HCO3 35, O2 sat 97  CXR: Small to moderate right pleural effusion. Right lung base atelectasis. Cardiomegaly    Upon assessment today 11/7/2022 patient is lying semi-Bob in hospital bed on BiPAP. Patient is alert and oriented and speaks in broken sentences off the BiPAP. Patient input on history is limited due to only limited time off BiPAP to communicate. Patient reports shortness of breath started last night after her BiPAP was put on incorrectly by her nurse and after asking several times to see a nurse she had not seen her in quite some time. She reports she developed respiratory distress due to BiPAP being improperly placed. She reports she does feel better on the BiPAP now and as fluid has been diuresed off of her. She denies chest pain, orthopnea, PND, diaphoresis, palpitations, dizziness, syncope. She is currently atrial fibrillation with controlled ventricular rate in the mid 80s to mid 90s on the monitor. She has received bolus dose digoxin 500 IV yesterday and is receiving to 50 mcg IV every 6 digoxin now. She is being diuresed with Lasix 40 mg IV twice daily. Per charting she is out -560 mL at this time. Clinically patient is hypervolemic. She is being treated prophylactically for pneumonia with ATB. Her blood pressures have been soft and heart rate has been below 100 since earlier this morning. Patient reports compliance with medications at facility. VS today 98 Fahrenheit, 22 respirations, 99 pulse, 101/59, 100% on BiPAP 50% FiO2. Please note: past medical records were reviewed per electronic medical record (EMR) - see detailed reports under Past Medical/ Surgical History. Past Medical History:    CAD:  08/10/11 Lexiscan MPS [facility]):  Normal Lexiscan portion. No evidence for inducible ischemia. No previous myocardial infarction. Ejection fraction 77%.   Ree Kimble MPS 04/25/2014: Reversible inferolateral wall perfusion defect. Finding suggests left ventricular myocardium at risk for stress-induced ischemia. EF >70%. Cardiac Catheterization (Dr. Vasyl Vargas) 04/28/2014: Left main:  The left main artery is a short vessel which did not appear to have any significant angiographic disease. Left anterior descending: The left anterior descending artery is a moderate-sized vessel which has minor luminal irregularities in its middle segment but without any significant angiographic luminal narrowing. The diagonal branches also did not appear to have any significant disease. Left circumflex: The left circumflex is a large, codominant vessel which did not appear to have any significant angiographic disease. Right coronary artery:  The right coronary artery is a moderate-sized codominant vessel which did not appear to have any significant angiographic disease. LEFT VENTRICULOGRAPHY:  The left ventricle is normal in size and contractility with an estimated ejection fraction of 60% to 65%. There was no mitral regurgitation. RIGHT FEMORAL ARTERY ANGIOGRAPHY:  The right common femoral artery and the proximal segments of the right superficial femoral artery and the right profunda did not appear to have any significant disease. Cardiac catheterization 1/11/2022: CONCLUSIONS:  Coronary artery disease: Left main. No significant disease. LAD. Heavily calcified proximal and mid vessel with 50% mid vessel stenosis. 60% proximal stenosis of a moderate size first diagonal branch. LCX. 50% ostial narrowing of the AV groove branch in the mid vessel which is a bifurcation lesion with a large marginal branch which itself did not appear to have any significant disease. The AV groove branch of the left circumflex continues to provide a posterior descending artery branch and the posterolateral branch (codominant vessel). RCA. Codominant vessel with no significant angiographic disease.  Normal left ventricular size with hyperdynamic left ventricular systolic function with an estimated ejection fraction of 75%. Systemic hypertension. Elevated left ventricular end-diastolic pressure (18)  Nonischemic HFpEF:  TTE 04/27/2014 (Dr. Vasyl Vargas): Normal left ventricle size and systolic function. No wall motion abnormalities. Mild concentric left ventricular hypertrophy. Ejection fraction is visually estimated at >55%. Normal right ventricular size and function. Aortic root is sclerotic and calcified  TTE 02/07/2021 (Dr. Clay Hernández): Normal left ventricle size and systolic function. Ejection fraction is visually estimated at 65-70%. No regional wall motion abnormalities seen. Moderate concentric left ventricular hypertrophy. Normal right ventricular size and function. No systolic mitral regurgitation noted. No hemodynamically significant aortic stenosis is present. Unable to estimate PA systolic pressure. No evidence for hemodynamically significant pericardial effusion. Valves were not well visualized, please consider PAT if clinical suspicion for endocarditis is high  Echocardiogram 1/11/2022:  Left ventricle is normal in size. Moderate concentric left ventricular hypertrophy. No     regional wall motion abnormalities seen. Ejection fraction is visually estimated at 70+/-5%. There is doppler evidence of stage I diastolic dysfunction. Mildly dilated right ventricle. Right ventricle global systolic function is normal ( TAPSE = 1.8 ). Normal size atria. No significant valvular abnormalities noted. PAT 4/18/22: Normal left ventricle size and systolic function. Ejection fraction is visually estimated at 60%. No regional wall motion abnormalities seen. Moderately dilated left atrium. No evidence of thrombus within left atrium or appendage. Agitated saline injected for shunt evaluation. No evidence of patent foramen ovale on bubble study. NAIF emptying velocity 18 cm/sec. Normal right ventricular size and function. Mild mitral regurgitation is present. No hemodynamically significant aortic stenosis is present. Mild tricuspid regurgitation. RVSP is 32 mmHg. Normal estimated PA systolic pressure. No evidence for hemodynamically significant pericardial effusion  TTE 7/07/2022: Technically difficult study - limited visualization. Micro-bubble contrast injected to enhance left ventricular visualization. Normal left ventricular size and systolic function. Ejection fraction is visually estimated at 70-75%. Indeterminate diastolic function. No regional wall motion abnormalities seen. Mild left ventricular concentric hypertrophy noted. Moderately dilated right ventricle with reduced function. Biatrial dilation. Mild tricuspid regurgitation. RVSP is at least 60 mmHg. Prominent pericardial fat pad. No definitive evidence of pericardial effusion. PAF: 4/2022 successful PAT/DCCV, successful DCCV 5/2022  Admission 7/2022 acute respiratory failure requiring intubation and hypotension treated with Levophed. Patient treated with Digibind for acute digoxin toxicity. Rapid AF started on amiodarone. Chest tube for right pleural effusion. Admission 8/2022: Rapid AF with hypoxic respiratory failure. Thoracentesis 900 mL right-sided  Morbid obesity BMI 46  Diabetes  CKD  COPD  Hyperlipidemia  Hypertension  Asthma  SERENA with noncompliance to CPAP  Iron deficiency anemia  Depression  Parkinson's disease  Stomach ulcers  Anxiety  Breast cancer right-sided s/p right lumpectomy with radiation  Tonsillectomy/adenoidectomy, tubal ligation, right tube and ovary surgical removal, cholecystectomy, surgical removal of adhesions, excision of sebaceous cyst from abdomen, release of hammertoes bilaterally, lumbar laminectomy herniated L4-L5, bilateral breast reduction    Medications Prior to admit:  Prior to Admission medications    Medication Sig Start Date End Date Taking?  Authorizing Provider   furosemide (LASIX) 20 MG tablet Take 20 mg by mouth daily   Yes Historical Provider, MD   glucose (GLUTOSE) 40 % GEL Take 15 g by mouth as needed (Low Blood Sugar)   Yes Historical Provider, MD   glucagon, rDNA, 1 MG injection Inject 1 mg into the muscle daily as needed for Low blood sugar   Yes Historical Provider, MD   metoprolol succinate (TOPROL XL) 100 MG extended release tablet Take 1 tablet by mouth in the morning and at bedtime 10/10/22   Salbador Hashimoto,    furosemide (LASIX) 40 MG tablet Take 1 tablet by mouth daily 10/11/22   Salbador Hashimoto,    polyethylene glycol (GLYCOLAX) 17 g packet Take 17 g by mouth daily as needed for Constipation 10/10/22 11/9/22  Salbador Hashimoto,    loperamide (IMODIUM) 2 MG capsule Take 2 mg by mouth 4 times daily as needed for Diarrhea    Historical Provider, MD   zolpidem (AMBIEN) 5 MG tablet Take 5 mg by mouth nightly.     Historical Provider, MD   OXYGEN Inhale 2 L into the lungs continuous    Historical Provider, MD   docusate sodium (COLACE, DULCOLAX) 100 MG CAPS Take 100 mg by mouth 2 times daily as needed for Constipation 9/8/22   Arcenio Lima MD   insulin glargine (LANTUS) 100 UNIT/ML injection vial Inject 13 Units into the skin 2 times daily 9/8/22   Arcenio Lima MD   atorvastatin (LIPITOR) 20 MG tablet Take 20 mg by mouth nightly    Historical Provider, MD   benzoin compound solution Apply 1 Applicatorful topically nightly Apply topically to right toe    Historical Provider, MD   ipratropium-albuterol (DUONEB) 0.5-2.5 (3) MG/3ML SOLN nebulizer solution Inhale 1 vial into the lungs 4 times daily    Historical Provider, MD   miconazole (MICOTIN) 2 % powder Apply 1 each topically 2 times daily Apply topically under breasts twice daily    Historical Provider, MD   insulin aspart (NOVOLOG) 100 UNIT/ML injection vial Inject 0-10 Units into the skin 3 times daily (before meals) *Per Sliding Scale*  140 - 199 = 1 Unit  200 - 249 = 2 Units  250 - 299 = 4 Units  300 - 349 = 6 Units  350 - 399 = 8 Units  400+  = 10 Units notify physician    Historical Provider, MD   pantoprazole (PROTONIX) 40 MG tablet Take 40 mg by mouth every morning    Historical Provider, MD   promethazine (PHENERGAN) 25 MG tablet Take 25 mg by mouth every 6 hours as needed for Nausea    Historical Provider, MD   mirtazapine (REMERON) 15 MG tablet Take 15 mg by mouth nightly    Historical Provider, MD   budesonide-formoterol (SYMBICORT) 160-4.5 MCG/ACT AERO Inhale 2 puffs into the lungs 2 times daily    Historical Provider, MD   acetaminophen (TYLENOL) 325 MG tablet Take 650 mg by mouth every 4 hours as needed for Pain or Fever    Historical Provider, MD   metoprolol tartrate (LOPRESSOR) 25 MG tablet Take 0.5 tablets by mouth in the morning and 0.5 tablets before bedtime. 7/27/22 9/8/22  Ilsa Russell MD   carbidopa-levodopa (SINEMET CR)  MG per extended release tablet Take 2 tablets by mouth in the morning and 2 tablets at noon and 2 tablets in the evening. 7/23/22   Ilsa Russell MD   rOPINIRole (REQUIP) 1 MG tablet Take 1 tablet by mouth in the morning and 1 tablet at noon and 1 tablet before bedtime. 7/23/22   Ilsa Russell MD   amiodarone (CORDARONE) 200 MG tablet Take 1 tablet by mouth in the morning. 7/24/22 9/8/22  Ilsa Russell MD   budesonide (PULMICORT) 0.5 MG/2ML nebulizer suspension Take 2 mLs by nebulization in the morning and 2 mLs in the evening. 7/23/22 9/8/22  Ilsa Russell MD   insulin glargine-yfgn [facility]) 100 UNIT/ML injection vial Inject 5 Units into the skin nightly 7/23/22 9/8/22  Ilsa Russell MD   QUEtiapine (SEROQUEL) 25 MG tablet Take 1 tablet by mouth in the morning and 1 tablet before bedtime. 7/23/22 9/8/22  Ilsa Russell MD   sucralfate (CARAFATE) 1 GM tablet Take 1 tablet by mouth in the morning and 1 tablet at noon and 1 tablet in the evening and 1 tablet before bedtime.  7/20/22   Leyla Gaines, DO   divalproex (DEPAKOTE) 250 MG DR tablet Take 250 mg by mouth 2 times daily  7/23/22  Historical Provider, MD   ferrous sulfate (IRON 325) 325 (65 Fe) MG tablet Take 325 mg by mouth daily (with breakfast)  Patient not taking: Reported on 7/7/2022 7/23/22  Historical Provider, MD   aspirin EC 81 MG EC tablet Take 1 tablet by mouth daily 5/17/22   Jose Elias Dennis MD   montelukast (SINGULAIR) 10 MG tablet Take 10 mg by mouth nightly    Historical Provider, MD       Current Medications:    Current Facility-Administered Medications: acetaminophen (TYLENOL) tablet 650 mg, 650 mg, Oral, Q4H PRN  aspirin EC tablet 81 mg, 81 mg, Oral, Daily  atorvastatin (LIPITOR) tablet 20 mg, 20 mg, Oral, Nightly  budesonide (PULMICORT) nebulizer suspension 500 mcg, 0.5 mg, Nebulization, BID  carbidopa-levodopa (SINEMET CR)  MG per extended release tablet 2 tablet, 2 tablet, Oral, TID  divalproex (DEPAKOTE) DR tablet 250 mg, 250 mg, Oral, BID  docusate sodium (COLACE) capsule 100 mg, 100 mg, Oral, BID PRN  insulin glargine (LANTUS) injection vial 13 Units, 13 Units, SubCUTAneous, BID  miconazole (MICOTIN) 2 % powder 1 each, 1 each, Topical, BID  pantoprazole (PROTONIX) tablet 40 mg, 40 mg, Oral, QAM  rOPINIRole (REQUIP) tablet 1 mg, 1 mg, Oral, TID  sucralfate (CARAFATE) tablet 1 g, 1 g, Oral, 4x Daily  sodium chloride flush 0.9 % injection 5-40 mL, 5-40 mL, IntraVENous, 2 times per day  sodium chloride flush 0.9 % injection 5-40 mL, 5-40 mL, IntraVENous, PRN  0.9 % sodium chloride infusion, , IntraVENous, PRN  enoxaparin Sodium (LOVENOX) injection 30 mg, 30 mg, SubCUTAneous, Daily  ondansetron (ZOFRAN-ODT) disintegrating tablet 4 mg, 4 mg, Oral, Q8H PRN **OR** ondansetron (ZOFRAN) injection 4 mg, 4 mg, IntraVENous, Q6H PRN  polyethylene glycol (GLYCOLAX) packet 17 g, 17 g, Oral, Daily PRN  acetaminophen (TYLENOL) tablet 650 mg, 650 mg, Oral, Q6H PRN **OR** acetaminophen (TYLENOL) suppository 650 mg, 650 mg, Rectal, Q6H PRN  furosemide (LASIX) injection 40 mg, 40 mg, IntraVENous, BID  ipratropium-albuterol (DUONEB) nebulizer solution 1 ampule, 1 ampule, Inhalation, Q4H WA  azithromycin (ZITHROMAX) 500 mg in sodium chloride 0.9 % 250 mL IVPB ([id number]), 500 mg, IntraVENous, Q24H    Allergies:  Ciprofloxacin and Codeine    Social History:    Social History     Socioeconomic History    Marital status:      Spouse name: Not on file    Number of children: Not on file    Years of education: Not on file    Highest education level: Not on file   Occupational History    Not on file   Tobacco Use    Smoking status: Never    Smokeless tobacco: Never   Vaping Use    Vaping Use: Never used   Substance and Sexual Activity    Alcohol use: No    Drug use: No    Sexual activity: Never   Other Topics Concern    Not on file   Social History Narrative    Not on file     Social Determinants of Health     Financial Resource Strain: Not on file   Food Insecurity: Not on file   Transportation Needs: Not on file   Physical Activity: Not on file   Stress: Not on file   Social Connections: Not on file   Intimate Partner Violence: Not on file   Housing Stability: Not on file       Family History:   Family History   Problem Relation Age of Onset    Cancer Mother         Lung Ca    Cancer Father         lung Ca    Diabetes Sister     Heart Disease Sister     Seizures Son     Other Brother         sepsis         REVIEW OF SYSTEMS:     Constitutional: Denies fevers, chills, night sweats. Fatigue  HEENT: Denies headaches, nose bleeds, and blurred vision,oral pain, abscess or lesion. Musculoskeletal: Denies falls, pain to BLE with ambulation. Bilateral lower extremity edema  Neurological: Denies dizziness and lightheadedness, numbness and tingling  Cardiovascular: Denies chest pain, palpitations, and feelings of heart racing. Respiratory: Denies orthopnea and PND. SOB/MORGAN  Gastrointestinal: Denies heartburn, nausea/vomiting, diarrhea and constipation, black/bloody, and tarry stools.    Genitourinary: Denies dysuria and hematuria  Hematologic: Denies excessive bruising or bleeding  Lymphatic: Denies lumps and bumps to neck, axilla, breast, and groin  Endocrine: Denies excessive thirst. Denies intolerance to hot and cold  GYN: Postmenopausal state; Denies vaginal bleeding. Psychiatric: Denies anxiety and depression. PHYSICAL EXAM:   BP (!) 101/59   Pulse 99   Temp 98 °F (36.7 °C)   Resp 22   Ht 5' (1.524 m)   Wt 239 lb 9.6 oz (108.7 kg)   SpO2 92%   BMI 46.79 kg/m²   CONST:  Well developed, morbidly obese 69-year-old  female who appears stated age. Awake, alert, cooperative, in mild respiratory distress when off BiPAP  HEENT:   Head- Normocephalic, atraumatic   Eyes- Conjunctivae pink, anicteric  Throat- Oral mucosa pink and moist  Neck-  No stridor, trachea midline, + JVD. No adenopathy   CHEST: Chest symmetrical and non-tender to palpation. No accessory muscle use or intercostal retractions  RESPIRATORY: Lung sounds -severely diminished throughout  CARDIOVASCULAR:     No carotid bruit  Heart Inspection- shows no noted pulsations  Heart Palpation- no heaves or thrills; PMI is non-displaced   Heart Ausculation-irregularly irregular rate and rhythm, no murmur. No s3, s4 or rub   PV: Bilateral +1 lower extremity pitting edema. No varicosities. Pedal pulses palpable, no clubbing or cyanosis   ABDOMEN: Soft, non-tender to light palpation. Bowel sounds present. No palpable masses no organomegaly; no abdominal bruit  MS: Good muscle strength and tone. No atrophy or abnormal movements. : Deferred  SKIN: Warm and dry no statis dermatitis or ulcers   NEURO / PSYCH: Oriented to person, place and time. Speech clear and appropriate. Follows all commands. Pleasant affect     DATA:    ECG: Atrial fibrillation with RVR, vent rate 112, QRS duration 74, QTc 450.   Tele strips: Atrial fibrillation with controlled ventricular rate, 85-95 heart rate  Diagnostic:      Intake/Output Summary (Last 24 hours) at 11/7/2022 0857  Last data filed at 11/7/2022 0825  Gross per 24 hour   Intake 240 ml   Output 800 ml   Net -560 ml       Labs:   CBC:   Recent Labs     11/06/22  1036   WBC 7.1   HGB 9.5*   HCT 34.6        BMP:   Recent Labs     11/06/22  1036      K 5.1*   CO2 36*   BUN 61*   CREATININE 1.9*   LABGLOM 28   CALCIUM 8.9     TSH:   Recent Labs     11/06/22  1755   TSH 6.520*     HgA1c:   Lab Results   Component Value Date    LABA1C 6.1 (H) 08/24/2022     FASTING LIPID PANEL:  Lab Results   Component Value Date/Time    CHOL 95 05/12/2022 01:44 AM    HDL 38 05/12/2022 01:44 AM    LDLCALC 38 05/12/2022 01:44 AM    TRIG 93 05/12/2022 01:44 AM      Latest Reference Range & Units 11/6/22 10:36   Pro-BNP 0 - 125 pg/mL 9,778 (H)   (H): Data is abnormally high    CXR:  Impression   1. Small to moderate right pleural effusion   2. Right lung base atelectasis   3. Cardiomegaly         Assessment and plan to follow as per Dr. Molly Shields. Electronically signed by RASHARD Capps CNP on 11/7/2022 at 8:57 Leland Bowles MD    I have personally participated in a face-to-face and personally obtained history and performed physical exam on the date of service. I reviewed chart, vitals, labs and radiologic studies. I also participated in medical decision making with RASHARD Capps CNP on the date of service All of the assessments and recommendations are from me and I agree with all of the pertinent clinical information, assessment and treatment plan. I have reviewed and edited the note above based on my findings during my history, exam, and decision making. Please see my additional contributions to the history, physical exam, assessment, and recommendations below. HISTORY OF PRESENT ILLNESS:     Reviewed, as above      Past medical history:  Reviewed, as above. Past surgical history:  Reviewed, as above.     Current medications:  Reviewed, as above    Allergies:  Reviewed, as above    Social history:  Reviewed, as above    Family medical history:  Reviewed, as above. REVIEW OF SYSTEMS:   Reviewed, as above. PHYSICAL EXAM:   CONSTITUTIONAL: Mild respiratory distress, and appears stated age  EYES:  lids and lashes normal and pupils equal, round and reactive to light, anicteric sclerae  HEAD:  normocepalic, without obvious abnormality, atraumatic, pink, moist mucous membranes. NECK:  Supple, symmetrical, trachea midline, no adenopathy, thyroid symmetric, not enlarged and no tenderness, skin normal  HEMATOLOGIC/LYMPHATICS:  no cervical lymphadenopathy and no supraclavicular lymphadenopathy  LUNGS: Mildly increased work of breathing, decreased air exchange, basilar rales  CARDIOVASCULAR:  Normal apical impulse, irregularly irregular, normal S1 and S2, no S3, 3/6 systolic murmur at the left lower sternal border, + JVD, no carotid bruit, no pedal edema, good carotid upstroke bilaterally. ABDOMEN:  Soft, nontender, no masses, no hepatomegaly or splenomegaly, BS+  CHEST: nontender to palpation, expands symmetrically  MUSCULOSKELETAL:  No clubbing no cyanosis. there is no redness, warmth, or swelling of the joints  NEUROLOGIC: Alert awake oriented x3  SKIN:  no bruising or bleeding, normal skin color, texture, turgor and no redness, warmth, or swelling    /64   Pulse 98   Temp 98 °F (36.7 °C) (Oral)   Resp 22   Ht 5' (1.524 m)   Wt 240 lb 9 oz (109.1 kg)   SpO2 99%   BMI 46.98 kg/m²       I/O last 3 completed shifts: In: 18 [P.O.:480]  Out: 1500 [Urine:1500]  I/O this shift:  In: -   Out: 1000 [Urine:1000]      DATA:   I personally reviewed the admission EKG with the following interpretation: Atrial fibrillation, rapid ventricular response, low voltage QRS    ECHO: 7/7/2022,Summary   Technically difficult study - limited visualization. Micro-bubble contrast injected to enhance left ventricular visualization.     Normal left ventricular size and systolic function. Ejection fraction is visually estimated at 70-75%. Indeterminate diastolic function. No regional wall motion abnormalities seen. Mild left ventricular concentric hypertrophy noted. Moderately dilated right ventricle with reduced function. Biatrial dilation. Mild tricuspid regurgitation. RVSP is at least 60 mmHg. Prominent pericardial fat pad. No definitive evidence of pericardial effusion. Stress Test: 1/10/2022,  ECG during the infusion did not change. 2.  The myocardial perfusion imaging was abnormal.   3.  The abnormality was a large sized, moderate fixed defect involving   the inferior and inferolateral wall suggestive prior infarct without   any reversibility. There was also a small sized mild perfusion defect   involving the mid to distal anterior wall suggestive ischemia. 4.  Overall left ventricular systolic function was normal without   regional wall motion abnormalities. 5.  No transient ischemic dilatation. 6.  High risk general pharmacologic stress test.         Angiography: 1/11/2022,CONCLUSIONS:  1. Coronary artery disease:  A. Left main. No significant disease. B.  LAD. Heavily calcified proximal and mid vessel with 50% mid vessel  stenosis. 60% proximal stenosis of a moderate size first diagonal  branch. C.  LCX. 50% ostial narrowing of the AV groove branch in the mid vessel  which is a bifurcation lesion with a large marginal branch which itself  did not appear to have any significant disease. The AV groove branch of  the left circumflex continues to provide a posterior descending artery  branch and the posterolateral branch (codominant vessel). D.  RCA. Codominant vessel with no significant angiographic disease. 2.  Normal left ventricular size with hyperdynamic left ventricular  systolic function with an estimated ejection fraction of 75%. 3.  Systemic hypertension.   4.  Elevated left ventricular end-diastolic pressure. Cardiology Labs: BMP:    Lab Results   Component Value Date/Time     11/07/2022 02:41 PM    K 5.5 11/07/2022 02:41 PM    CL 96 11/07/2022 02:41 PM    CO2 35 11/07/2022 02:41 PM    BUN 65 11/07/2022 02:41 PM     CMP:    Lab Results   Component Value Date/Time     11/07/2022 02:41 PM    K 5.5 11/07/2022 02:41 PM    CL 96 11/07/2022 02:41 PM    CO2 35 11/07/2022 02:41 PM    BUN 65 11/07/2022 02:41 PM    PROT 5.3 11/07/2022 02:41 PM     CBC:    Lab Results   Component Value Date/Time    WBC 7.6 11/07/2022 02:41 PM    RBC 3.67 11/07/2022 02:41 PM    HGB 9.3 11/07/2022 02:41 PM    HCT 34.2 11/07/2022 02:41 PM    MCV 93.2 11/07/2022 02:41 PM    RDW 15.4 11/07/2022 02:41 PM     11/07/2022 02:41 PM     PT/INR:  No results found for: PTINR  PT/INR Warfarin:  No components found for: PTPATWAR, PTINRWAR  PTT:    Lab Results   Component Value Date/Time    APTT 33.9 07/20/2022 04:54 AM     PTT Heparin:  No components found for: APTTHEP  Magnesium:    Lab Results   Component Value Date/Time    MG 2.1 10/14/2022 08:01 AM     TSH:    Lab Results   Component Value Date/Time    TSH 6.520 11/06/2022 05:55 PM     TROPONIN:  No components found for: TROP  BNP:  No results found for: BNP  FASTING LIPID PANEL:    Lab Results   Component Value Date/Time    CHOL 95 05/12/2022 01:44 AM    HDL 38 05/12/2022 01:44 AM    TRIG 93 05/12/2022 01:44 AM     XR CHEST PORTABLE   Final Result   1. Small to moderate right pleural effusion   2. Right lung base atelectasis   3. Cardiomegaly         CT CHEST WO CONTRAST    (Results Pending)       I have personally reviewed the laboratory, cardiac diagnostic and radiographic testing as outlined above:    IMPRESSION:  1. Acute exacerbation of congestive heart failure: Decompensated, second admission in less than a month, will adjust diuretic therapy  2. Acute hypoxic respiratory failure: Secondary to above  3.   Atrial fibrillation with RVR: Rate is better controlled on current treatment, will continue  4. Elevated troponin: Secondary to acute hypoxic ventilatory failure  5. CAD: Nonobstructive involving LAD and left circumflex artery  6. Tricuspid valve regurgitation: Mild  7. Pulmonary hypertension: Moderate  8. Hypertension: Controlled  9. Hyperlipidemia  10. Type 2 diabetes mellitus  11. History of beta-blocker and amiodarone induced bradycardia  12. History of digitoxicity  13. Acute kidney injury  14. Hyperkalemia    RECOMMENDATIONS:   IV Lasix  Low-dose amiodarone  Continue the rest of medications  Intake and output   ventilatory support as needed  Basic metabolic panel and CBC in the a.m. Further cardiac recommendations will be forthcoming pending her clinical course and diagnostic test findings    I have reviewed my findings and recommendations with patient    Thank you for the consult  Electronically signed by Leon Blandon MD on 11/8/2022 at 3:11 AM    All of the above was discussed with the patient  reviewing the above stated recommendations. And a total of >50% of that time involved face-to-face time providing counseling and or coordination of care with the other providers, reviewing records/tests, counseling/education of the patient, ordering medications/tests/procedures, coordinating care, and documenting clinical information in the EHR. I also discussed the differential diagnosis and all of the proposed management plans with the patient    NOTE: This report was transcribed using voice recognition software.  Every effort was made to ensure accuracy; however, inadvertent computerized transcription errors may be present

## 2022-11-07 NOTE — PROGRESS NOTES
Admitting Date and Time: 11/6/2022 10:01 AM  Admit Dx: [facility] exacerb[facility] [J44.1]  Acute on chronic respiratory failure (HCC) [J96.20]  Acute on chronic respiratory failure with hypercapnia (HCC) [J96.22]  Hypercapnic respiratory failure (HCC) [J96.92]    Subjective:    Pt feels shortness of breath is improved      ROS: denies fever, chills, cp, sob, n/v, HA unless stated above.      amiodarone  200 mg Oral Daily    apixaban  5 mg Oral BID    doxycycline (VIBRAMYCIN) IV  100 mg IntraVENous Q12H    methylPREDNISolone  30 mg IntraVENous 3 times per day    atorvastatin  20 mg Oral Nightly    budesonide  0.5 mg Nebulization BID    carbidopa-levodopa  2 tablet Oral TID    divalproex  250 mg Oral BID    insulin glargine  13 Units SubCUTAneous BID    miconazole  1 each Topical BID    pantoprazole  40 mg Oral QAM    rOPINIRole  1 mg Oral TID    sucralfate  1 g Oral 4x Daily    sodium chloride flush  5-40 mL IntraVENous 2 times per day    furosemide  40 mg IntraVENous BID    ipratropium-albuterol  1 ampule Inhalation Q4H WA     glucose, 4 tablet, PRN  dextrose bolus, 125 mL, PRN   Or  dextrose bolus, 250 mL, PRN  glucagon (rDNA), 1 mg, PRN  dextrose, , Continuous PRN  acetaminophen, 650 mg, Q4H PRN  docusate sodium, 100 mg, BID PRN  sodium chloride flush, 5-40 mL, PRN  sodium chloride, , PRN  ondansetron, 4 mg, Q8H PRN   Or  ondansetron, 4 mg, Q6H PRN  polyethylene glycol, 17 g, Daily PRN  acetaminophen, 650 mg, Q6H PRN   Or  acetaminophen, 650 mg, Q6H PRN         Objective:    /62   Pulse 99   Temp 97.4 °F (36.3 °C) (Infrared)   Resp 22   Ht 5' (1.524 m)   Wt 239 lb 9.6 oz (108.7 kg)   SpO2 100%   BMI 46.79 kg/m²   General Appearance: alert and oriented to person, place and time and in no acute distress  Skin: warm and dry  Head: normocephalic and atraumatic  Eyes: pupils equal, round, and reactive to light, extraocular eye movements intact, conjunctivae normal  Neck: neck supple and non tender without mass Pulmonary/Chest: clear to auscultation bilaterally- no wheezes, rales or rhonchi, normal air movement, no respiratory distress  Cardiovascular: normal rate, normal S1 and S2 and no carotid bruits  Abdomen: soft, non-tender, non-distended, normal bowel sounds, no masses or organomegaly  Extremities: no cyanosis, no clubbing and   Mild / trace edema  Neurologic: no cranial nerve deficit and speech normal      Recent Labs     11/06/22  1036 11/07/22  1441    139   K 5.1* 5.5*   CL 96* 96*   CO2 36* 35*   BUN 61* 65*   CREATININE 1.9* 1.9*   GLUCOSE 213* 181*   CALCIUM 8.9 8.8       Recent Labs     11/07/22  1441   ALKPHOS 74   PROT 5.3*   LABALBU 3.3*   BILITOT 0.4   AST 7   ALT <5       Recent Labs     11/06/22  1036 11/07/22  1441   WBC 7.1 7.6   RBC 3.63 3.67   HGB 9.5* 9.3*   HCT 34.6 34.2   MCV 95.3 93.2   MCH 26.2 25.3*   MCHC 27.5* 27.2*   RDW 15.8* 15.4*    193   MPV 11.3 10.6           Radiology:   XR CHEST PORTABLE   Final Result   1. Small to moderate right pleural effusion   2. Right lung base atelectasis   3. Cardiomegaly         CT CHEST WO CONTRAST    (Results Pending)       Assessment:  Active Problems:    Acute decompensated heart failure (HCC)    SHANTANU (acute kidney injury) ([facility]    Acute respiratory failure with hypoxia and hypercapnia (HCC)    Acute on chronic respiratory failure (HCC)    Depression with anxiety    Hyperlipidemia    Obstructive sleep apnea syndrome    Atrial fibrillation with RVR ([facility]  Resolved Problems:    * No resolved hospital problems. *      Plan: History of present illness from History and Physical:  The patient is a 67 y.o. female with significant past medical history of CHF, A. Fib, Hypertension Essential, Hx of Breast Ca who presents with weakness and lower extremity swelling. She noted that for the last 2 days she is feeling more tired this has progressively gotten worse started today. She was admitted to increased lower extremity edema.   She denied fever chills no nausea no vomiting no cough. In the ER she was found to be lethargic. ABG was suggestive of hypercapnia. Her pH was 7.24, PCO2 was 80. She was placed on BiPAP and admitted for further evaluation. In the ER she was also noted to have irregular heartbeat with heart rate persistently above 100 with high of 130s and low of 104. Blood pressure was also low. She is being admitted for further management. EKG in the ER shows A. fib with RVR. Due to fluid overload and uncontrolled atrial fibrillation she was given loading dose of digoxin and additional 2 doses of every 6 hours after the initial dose. Decompensated acute on chronic respiratory failure: Volume overload. Input by pulmonary appreciated who has her on antibiotics. Acute decompensated diastolic heart failure. Initially with hypotension therefore diuretics was not given it initially. Cardiology consulted. A. wilmer rapid ventricular response was on amiodarone at home initially held here then started on digoxin . Cardiology restarted amiodarone  Obstructive sleep apnea BiPAP ordered obesity needs lifestyle modification follow-up as an outpatient  copD continue current care not in exacerbation  Type II diabetes sliding scale diabetic diet current insulin regimen  Hyperlipidemia on statin  Skin fold rash nystatin powder  Insomnia Ambien initially on hold due to hypercapnia and I will continue to hold it due to the following  Dementia possibly concerns continue Sinemet  DVT prophylaxis: Eliquis  Full code. Disposition: Would likely benefit from placement      ATTESTATION:    NOTE: This report was transcribed using voice recognition software. Every effort was made to ensure accuracy; however, inadvertent computerized transcription errors may be present.      Electronically signed by Neeta Salvador MD on 11/7/2022 at 6:35 PM

## 2022-11-07 NOTE — PROGRESS NOTES
Pulmonary/Critical Care Progress Note    We are following patient for resp failure with hypoxia and hypercapnea    SUBJECTIVE:  67year old with SERENA, obesity,COPD with chronic chf and atrial fib. Progressively worse coming to the hospital and with ABG deragements placed on bipapp. Improvement thereafter and transitions to nc to eat and drink. PMHx: Reviewed by chart, dm, htn, hypelipd, copd, CKD, as well parkinsons disease and CAD. Plus above. MEDICATIONS:   amiodarone  200 mg Oral Daily    apixaban  5 mg Oral BID    atorvastatin  20 mg Oral Nightly    budesonide  0.5 mg Nebulization BID    carbidopa-levodopa  2 tablet Oral TID    divalproex  250 mg Oral BID    insulin glargine  13 Units SubCUTAneous BID    miconazole  1 each Topical BID    pantoprazole  40 mg Oral QAM    rOPINIRole  1 mg Oral TID    sucralfate  1 g Oral 4x Daily    sodium chloride flush  5-40 mL IntraVENous 2 times per day    furosemide  40 mg IntraVENous BID    ipratropium-albuterol  1 ampule Inhalation Q4H WA    azithromycin  500 mg IntraVENous Q24H      sodium chloride       acetaminophen, docusate sodium, sodium chloride flush, sodium chloride, ondansetron **OR** ondansetron, polyethylene glycol, acetaminophen **OR** acetaminophen    Review of Systems   Constitutional:  Positive for fatigue. HENT: Negative. Eyes: Negative. Respiratory:  Positive for cough and shortness of breath. Cardiovascular: Negative. Gastrointestinal: Negative. Endocrine: Negative. Genitourinary: Negative. Musculoskeletal: Negative. Skin: Negative. Allergic/Immunologic: Negative. Neurological:  Positive for weakness. Hematological: Negative. Psychiatric/Behavioral: Negative.        OBJECTIVE:  Vitals:    11/07/22 1350   BP:    Pulse:    Resp:    Temp:    SpO2: 98%     FiO2 : 50 %  O2 Flow Rate (L/min): 13 L/min  O2 Device: High flow nasal cannula    PHYSICAL EXAM:  Constitutional: Alert and talkative obese woman  Skin: no breakdown and no ischemia  HEENT: Normocephalic and supple  Neck: No  jvd or bruits  Cardiovascular: Rr irreg and no murmer  Respiratory: deminished and few late wheezes  Gastrointestinal: soft and bowel sounds intact  Genitourinary: deferred  Extremities: no dvt of ischemia  Neurological: intact grossly talkative and interactive   Psychological: appropriate      LABS:  WBC   Date Value Ref Range Status   11/06/2022 7.1 4.5 - 11.5 E9/L Final   10/16/2022 7.1 4.5 - 11.5 E9/L Final   10/14/2022 7.4 4.5 - 11.5 E9/L Final     Hemoglobin   Date Value Ref Range Status   11/06/2022 9.5 (L) 11.5 - 15.5 g/dL Final   10/16/2022 9.9 (L) 11.5 - 15.5 g/dL Final   10/14/2022 10.0 (L) 11.5 - 15.5 g/dL Final     Hematocrit   Date Value Ref Range Status   11/06/2022 34.6 34.0 - 48.0 % Final   10/16/2022 33.8 (L) 34.0 - 48.0 % Final   10/14/2022 34.8 34.0 - 48.0 % Final     MCV   Date Value Ref Range Status   11/06/2022 95.3 80.0 - 99.9 fL Final   10/16/2022 92.6 80.0 - 99.9 fL Final   10/14/2022 93.5 80.0 - 99.9 fL Final     Platelets   Date Value Ref Range Status   11/06/2022 168 130 - 450 E9/L Final   10/16/2022 247 130 - 450 E9/L Final   10/14/2022 269 130 - 450 E9/L Final     Sodium   Date Value Ref Range Status   11/06/2022 141 132 - 146 mmol/L Final   10/16/2022 139 132 - 146 mmol/L Final   10/14/2022 139 132 - 146 mmol/L Final     Potassium   Date Value Ref Range Status   11/06/2022 5.1 (H) 3.5 - 5.0 mmol/L Final   10/16/2022 4.3 3.5 - 5.0 mmol/L Final   10/14/2022 4.8 3.5 - 5.0 mmol/L Final     Potassium reflex Magnesium   Date Value Ref Range Status   10/13/2022 4.2 3.5 - 5.0 mmol/L Final   10/12/2022 4.3 3.5 - 5.0 mmol/L Final   10/05/2022 4.2 3.5 - 5.0 mmol/L Final     Chloride   Date Value Ref Range Status   11/06/2022 96 (L) 98 - 107 mmol/L Final   10/16/2022 93 (L) 98 - 107 mmol/L Final   10/14/2022 93 (L) 98 - 107 mmol/L Final     CO2   Date Value Ref Range Status   11/06/2022 36 (H) 22 - 29 mmol/L Final   10/16/2022 39 (H) 22 - 29 mmol/L Final   10/14/2022 38 (H) 22 - 29 mmol/L Final     BUN   Date Value Ref Range Status   11/06/2022 61 (H) 6 - 23 mg/dL Final   10/16/2022 29 (H) 6 - 23 mg/dL Final   10/14/2022 27 (H) 6 - 23 mg/dL Final     Creatinine   Date Value Ref Range Status   11/06/2022 1.9 (H) 0.5 - 1.0 mg/dL Final   10/16/2022 0.9 0.5 - 1.0 mg/dL Final   10/14/2022 1.1 (H) 0.5 - 1.0 mg/dL Final     Glucose   Date Value Ref Range Status   11/06/2022 213 (H) 74 - 99 mg/dL Final   10/16/2022 230 (H) 74 - 99 mg/dL Final   10/14/2022 334 (H) 74 - 99 mg/dL Final     Calcium   Date Value Ref Range Status   11/06/2022 8.9 8.6 - 10.2 mg/dL Final   10/16/2022 8.9 8.6 - 10.2 mg/dL Final   10/14/2022 8.8 8.6 - 10.2 mg/dL Final     Total Protein   Date Value Ref Range Status   10/14/2022 6.3 (L) 6.4 - 8.3 g/dL Final   10/13/2022 5.6 (L) 6.4 - 8.3 g/dL Final   10/08/2022 4.8 (L) 6.4 - 8.3 g/dL Final     Albumin   Date Value Ref Range Status   10/14/2022 3.5 3.5 - 5.2 g/dL Final   10/13/2022 3.1 (L) 3.5 - 5.2 g/dL Final   10/08/2022 2.5 (L) 3.5 - 5.2 g/dL Final     Total Bilirubin   Date Value Ref Range Status   10/14/2022 0.3 0.0 - 1.2 mg/dL Final   10/13/2022 0.3 0.0 - 1.2 mg/dL Final   10/08/2022 0.4 0.0 - 1.2 mg/dL Final     Alkaline Phosphatase   Date Value Ref Range Status   10/14/2022 98 35 - 104 U/L Final   10/13/2022 87 35 - 104 U/L Final   10/08/2022 74 35 - 104 U/L Final     AST   Date Value Ref Range Status   10/14/2022 7 0 - 31 U/L Final   10/13/2022 12 0 - 31 U/L Final     Comment:     Specimen is slightly Hemolyzed. Result may be artificially increased.    10/08/2022 7 0 - 31 U/L Final     ALT   Date Value Ref Range Status   10/14/2022 <5 0 - 32 U/L Final   10/13/2022 <5 0 - 32 U/L Final   10/08/2022 5 0 - 32 U/L Final     Est, Glom Filt Rate   Date Value Ref Range Status   11/06/2022 28 >=60 mL/min/1.73 Final     Comment:     Pediatric calculator link  [email or web address]. [email or web address]/gfr_calculatorped  Effective Oct 3, 2022  These results are not intended for use in patients  <25years of age. eGFR results are calculated without  a race factor using the 2021 CKD-EPI equation. Careful  clinical correlation is recommended, particularly when  comparing to results calculated using previous equations. The CKD-EPI equation is less accurate in patients with  extremes of muscle mass, extra-renal metabolism of  creatinine, excessive creatinine ingestion, or following  therapy that affects renal tubular secretion. GFR Non-   Date Value Ref Range Status   10/16/2022 >60 >=60 mL/min/1.73 Final     Comment:     Chronic Kidney Disease: less than 60 ml/min/1.73 sq.m. Kidney Failure: less than 15 ml/min/1.73 sq.m. Results valid for patients 18 years and older. 10/14/2022 49 >=60 mL/min/1.73 Final     Comment:     Chronic Kidney Disease: less than 60 ml/min/1.73 sq.m. Kidney Failure: less than 15 ml/min/1.73 sq.m. Results valid for patients 18 years and older. 10/13/2022 >60 >=60 mL/min/1.73 Final     Comment:     Chronic Kidney Disease: less than 60 ml/min/1.73 sq.m. Kidney Failure: less than 15 ml/min/1.73 sq.m. Results valid for patients 18 years and older. GFR    Date Value Ref Range Status   10/16/2022 >60  Final   10/14/2022 59  Final   10/13/2022 >60  Final     Magnesium   Date Value Ref Range Status   10/14/2022 2.1 1.6 - 2.6 mg/dL Final   10/08/2022 1.9 1.6 - 2.6 mg/dL Final   10/07/2022 1.8 1.6 - 2.6 mg/dL Final     Phosphorus   Date Value Ref Range Status   10/08/2022 3.0 2.5 - 4.5 mg/dL Final   10/07/2022 2.8 2.5 - 4.5 mg/dL Final   10/06/2022 2.6 2.5 - 4.5 mg/dL Final     Recent Labs     11/07/22  0549   PH 7.356   PO2 120.1*   PCO2 65.0*   HCO3 35.6*   BE 8.4*   O2SAT 97.9       RADIOLOGY:  XR CHEST PORTABLE   Final Result   1.  Small to moderate right pleural effusion   2. Right lung base atelectasis   3. Cardiomegaly                 PROBLEM LIST:  Active Problems:    Acute decompensated heart failure (HCC)    SHANTANU (acute kidney injury) ([facility] [address]    Acute respiratory failure with hypoxia and hypercapnia (HCC)    Acute on chronic respiratory failure (HCC)    Depression with anxiety    Hyperlipidemia    Obstructive sleep apnea syndrome    Atrial fibrillation with RVR ([facility]  Resolved Problems:    * No resolved hospital problems. *      IMPRESSION:  Decompensated acute on chronic respiratory failure  Recurrent right effusion with patient having a prior right chest tube in July  Underlying COPD and SERENA  Underlying atrial fibrillation    PLAN:  CT scan of the chest  Continue BiPAP and respiratory measures of treatment including antibiotics    ATTESTATION:  ICU Staff Physician note of personal involvement in Care  As the attending physician, I certify that I personally reviewed the patients history and personnally examined the patient to confirm the physical findings described above,  And that I reviewed the relevant imaging studies and available reports. I also discussed the differential diagnosis and all of the proposed management plans with the patient and individuals accompanying the patient to this visit. They had the opportunity to ask questions about the proposed management plans and to have those questions answered. This patient has a high probability of sudden, clinically significant deterioration, which requires the highest level of physician preparedness to intervene urgently. I managed/supervised life or organ supporting interventions that required frequent physician assessment. I devoted my full attention to the direct care of this patient for the amount of time indicated below.   Time I spent with the family or surrogate(s) is included only if the patient was incapable of providing the necessary information or participating in medical decisions - Time devoted to teaching and to any procedures I billed separately is not included.     CRITICAL CARE TIME:  32 minutes    Electronically signed by Buddy Ro DO on 11/7/2022 at 2:30 PM

## 2022-11-07 NOTE — ACP (ADVANCE CARE PLANNING)
Advance Care Planning   Healthcare Decision Maker:    Primary Decision Maker: Richard Miranda Child - [phone]    Primary Decision Maker: Mo Vasquez - Child - [phone]    Primary Decision Maker: Patricia miller - Child - [phone]

## 2022-11-07 NOTE — PROGRESS NOTES
The patient arrived to the floor from the ED. During her initial assessment and vitals her pulse oxygen was unable to read on her finger, ear, or forehead. The patient was asymptomatic, but pulmonary was consulted and ABGs were drawn. Will continue to monitor the patient.

## 2022-11-07 NOTE — CONSULTS
Assessment:  Acute hypoxic and hypercapnic respiratory failure  Acute on chronic diastolic heart failure  Obstructive sleep apnea  Morbid obesity with a BMI 41.60 kg/m²    Plan:   Continue BiPAP and titrate FiO2 to maintain SPO2 greater than 90%  Continue DuoNeb aerosol treatments  Recheck arterial blood gases in the morning  Start empiric antibiotic treatment with Zithromax  Continuous cardiac monitor with oximetry    History of Present Illness:   Patient is 74-year-old female with past medical history of acute chronic congestive heart failure, atrial fibrillation, asthma, COPD, and breast cancer. She presented to the emergency department after feeling fatigued  for the last 2 days that is progressively becoming worse. She had also noticed an increase of edema in her lower extremities. Arterial blood gases Emergency Department showed respiratory acidosis with a pH of 7.24 and PCO2 of 80. She was also in atrial fibrillation with RVR where she received digoxin. November 6, 2022:  The patient was seen and examined at the bedside. We are consulted to see this patient due to hypoxia and hypercapnia respiratory failure. The patient's nurse reported that they are unable to get an SPO2 on her because her hands are cold. Arterial blood gases were checked that showed a pH of 7.38, PCO2 of 58 a PO2 of 433 and HCO3 of 33.8. We will notify respiratory to titrate FiO2 down to maintain SPO2 greater than 90. We will check and noticed arterial blood gases tomorrow morning and if her PCO2 is down within normal range will attempt to try nasal cannula. Chest x-ray showed a small to moderate right pleural effusion, with right lung base atelectasis.   We will check procalcitonin levels will start empiric antibiotic coverage for Zithromax    Past Medical History:  Past Medical History:   Diagnosis Date    A[facility]fib ([facility]     Acute on chronic congestive heart failure (HCC)     Anxiety     Asthma     CAD (coronary artery disease) 01/21/2016    Cancer ([facility]  breast ca 2006    right lumpectomy    Chronic kidney disease     nephrolithiasis    COPD exacerbation ([facility] 10/12/2022    Depression     Diabetes mellitus ([facility]     H/O mammogram     Hx MRSA infection     toe infection january 2012    Hyperlipidemia     Hypertension     Lateral epicondylitis     Morbid obesity (HCC)     SERENA on CPAP     Oxygen dependent     Parkinson's disease ([facility]     Tubal ligation status       Past Surgical History:   Procedure Laterality Date    BREAST LUMPECTOMY      BREAST REDUCTION SURGERY      CARDIAC CATHETERIZATION  4/28/2014    Dr. Jaimes Neighbours  01/11/2022    Dr. Shelly Santamaria  7/29/15    CT GUIDED CHEST TUBE  7/8/2022    CT GUIDED CHEST TUBE 7/8/2022 Abram Reyes MD [facility]    ENDOSCOPY, COLON, DIAGNOSTIC  7/19/15    GALLBLADDER SURGERY      LUMBAR LAMINECTOMY      TOE AMPUTATION      TONSILLECTOMY      UPPER GASTROINTESTINAL ENDOSCOPY      UPPER GASTROINTESTINAL ENDOSCOPY N/A 7/19/2022    EGD ESOPHAGOGASTRODUODENOSCOPY performed by Armando Mares MD at [facility] ENDOSCOPY       Family History:   @[facility]@    Allergies:         Ciprofloxacin and Codeine    Social history:  Social History     Socioeconomic History    Marital status:       Spouse name: Not on file    Number of children: Not on file    Years of education: Not on file    Highest education level: Not on file   Occupational History    Not on file   Tobacco Use    Smoking status: Never    Smokeless tobacco: Never   Vaping Use    Vaping Use: Never used   Substance and Sexual Activity    Alcohol use: No    Drug use: No    Sexual activity: Never   Other Topics Concern    Not on file   Social History Narrative    Not on file     Social Determinants of Health     Financial Resource Strain: Not on file   Food Insecurity: Not on file   Transportation Needs: Not on file   Physical Activity: Not on file   Stress: Not on file   Social Connections: Not on file Intimate Partner Violence: Not on file   Housing Stability: Not on file       Current Medications:     Current Facility-Administered Medications:     digoxin (LANOXIN) injection 250 mcg, 250 mcg, IntraVENous, Q6H, Brent Davies MD    furosemide (LASIX) injection 40 mg, 40 mg, IntraVENous, BID, Aakash Buckley MD    [START ON 11/7/2022] ipratropium-albuterol (DUONEB) nebulizer solution 1 ampule, 1 ampule, Inhalation, Q4H WA, Aakash Buckley MD    Review of Systems:   General: denies loss of appetite, fever, chills, night sweats. Reports recent weight gain with fluid  HEENT: denies headaches, dizziness, head trauma, visual changes  Respiratory: denies chest pain, cough and hemoptysis, reports dyspnea  Cardiovascular: denies orthopnea, paroxysmal nocturnal dyspnea, reports recent increase in leg swelling  Gastrointestinal: denies pain, nausea vomiting, diarrhea, constipation, melena or bleeding. Genitourinary: denies hematuria, frequency, urgency or dysuria  Neurology: denies syncope, seizures, paralysis, paraesthesia   Endocrine: denies polyuria, polydipsia, skin or hair changes, and heat or cold intolerance  Musculoskeletal: denies joint pain, swelling, arthritis or myalgia  Hematologic: denies bleeding, adenopathy and easy bruising  Skin: denies rashes and skin discoloration  Psychiatry: denies depression    Physical Exam:   Vital Signs:  /81   Pulse (!) 103   Temp 96.8 °F (36 °C) (Axillary)   Resp 23   Ht 5' (1.524 m)   Wt 213 lb (96.6 kg)   SpO2 98%   BMI 41.60 kg/m²     Input/Output:  No intake/output data recorded.     Oxygen requirements: BiPAP 18/6 FiO2 100%         General appearance: Resting comfortably using BiPAP  HEENT: Atraumatic/normocephalic, EOMI, MEGAN, pharynx clear, moist mucosa  Neck: Supple, no jugular venous distension, lymphadenopathy, thyromegaly   Chest: Diminished bilateral breath sounds, no wheezing, no crackles   Cardiovascular: Normal S1 , S2, regular rate and rhythm, no murmur, rub or gallop  Abdomen: Normal sounds present, soft, lax with no tenderness, no hepatosplenomegaly, and no masses  Extremities: 2-3+ lower extremity edema pulses are equally present.    Skin: intact, no rashes   Neurologic: Alert and oriented x 3, No focal deficit     Investigations:  Labs, radiological imaging and cardiac work up were reviewed            Electronically signed by RASHARD Hughes CNP on 11/6/2022 at 8:41 PM

## 2022-11-07 NOTE — DISCHARGE INSTR - COC
Continuity of Care Form    Patient Name: Pita Demarco   :  [id number]  MRN:  [id number]    Admit date:  2022  Discharge date:  2022     Code Status Order: Full Code   Advance Directives:     Admitting Physician:  Natalia Smith MD  PCP: Elizabeth Willingham DO    Discharging Nurse: [facility] Unit/Room#: 9108/9472-05  Discharging Unit Phone Number: [phone]    Emergency Contact:   Extended Emergency Contact Information  Primary Emergency Contact: [address] Phone: [phone]  Mobile Phone: [phone]  Relation: Child   needed? No  Secondary Emergency Contact: Lilliana Pedroza  [place] Phone: [phone]  Mobile Phone: [phone]  Relation: Child   needed?  No    Past Surgical History:  Past Surgical History:   Procedure Laterality Date    BREAST LUMPECTOMY      BREAST REDUCTION SURGERY      CARDIAC CATHETERIZATION  2014    Dr. Mathur Dub  2022    Dr. Franklin Resides  7/29/15    CT GUIDED CHEST TUBE  2022    CT GUIDED CHEST TUBE 2022 Yudith Flores MD [facility]    ENDOSCOPY, COLON, DIAGNOSTIC  7/19/15    GALLBLADDER SURGERY      LUMBAR LAMINECTOMY      TOE AMPUTATION      TONSILLECTOMY      UPPER GASTROINTESTINAL ENDOSCOPY      UPPER GASTROINTESTINAL ENDOSCOPY N/A 2022    EGD ESOPHAGOGASTRODUODENOSCOPY performed by Mikki Holter, MD at [address] [place] [address]       Immunization History:   Immunization History   Administered Date(s) Administered    COVID-19, PFIZER GRAY top, DO NOT Dilute, (age 15 y+), IM, 30 mcg/0.3 mL 2022    Influenza Vaccine, unspecified formulation 10/15/2014    Influenza Virus Vaccine 10/31/2008, 10/05/2011    Influenza, High Dose (Fluzone 65 yrs and older) 2015, 2016, 2017, 2019    Influenza, Triv, inactivated, subunit, adjuvanted, IM (Fluad 65 yrs and older) 2019    Pneumococcal Conjugate 13-valent Sonia Ray) 05/27/2016    Pneumococcal Polysaccharide ([id number]) 12/21/2012, 01/29/2018    Td, unspecified formulation 03/11/2014    Tdap (Boostrix, Adacel) 09/30/2015    Zoster Live (Zostavax) 06/25/2013    Zoster Recombinant (Shingrix) 12/10/2019       Active Problems:  Patient Active Problem List   Diagnosis Code    Depression with anxiety F41.8    Osteoporosis M81.0    Asthma J45.909    Hyperlipidemia E78.5    Mitral regurgitation I34.0    Obstructive sleep apnea syndrome G47.33    Psoriasis L40.9    Diabetes mellitus (HCC) E11.9    Parkinson's disease ([facility] G20    Primary hypertension I10    Microalbuminuria R80.9    Morbid obesity ([facility] E66.01    RLS (restless legs syndrome) G25.81    Generalized weakness R53.1    Inability to walk R26.2    Hypothyroidism E03.9    Chest pain R07.9    Acute asthma exacerbation J45.901    Asthma exacerbation, mild J45.901    Paroxysmal atrial fibrillation (HCC) I48.0    Atrial fibrillation with RVR ([facility]) I48.91    Acute on chronic congestive heart failure (HCC) I50.9    Coronary artery disease involving native coronary artery of native heart without angina pectoris I25.10    Dysphagia R13.10    Hepatosplenomegaly R16.2    Acute decompensated heart failure (HCC) D28.7    Acute diastolic (congestive) heart failure (HCC) I50.31    Nonrheumatic tricuspid valve regurgitation I36.1    Tobacco abuse Z72.0    Recurrent syncope R55    Septic shock ([facility]) A41.9, R65.21    Seizure-like activity ([facility]) R56.9    SHANTANU (acute kidney injury) ([facility] N17.9    Pancytopenia (HCC) D61.818    Thrombocytopenia (HCC) D69.6    Encephalopathy G93.40    Acute respiratory failure with hypoxia (HCC) J96.01    Lactic acidosis E87.20    Delirium R41.0    Acute on chronic anemia D64.9    Pleural effusion, right J90    Acute respiratory failure with hypoxia and hypercapnia (HCC) J96.01, J96.02    Acute confusion R41.0    Chronic diastolic (congestive) heart failure (HCC) I50.32    Macrocytosis D75.89    Other specified anemias D64.89    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[facility] E11.22, N18.30    Puerperal sepsis with acute hypoxic respiratory failure without septic shock ([facility]) O85, R65.20, J96.01    Pulmonary HTN ([facility]) I27.20    Chronic anticoagulation Z79.01    RVF (right ventricular failure) ([facility]) Z56.548    Metabolic alkalosis U60.1    Sepsis ([facility]) A41.9    COPD exacerbation ([facility]) J44.1    Acute on chronic respiratory failure with hypercapnia ([facility]) J96.22    Persistent atrial fibrillation ([facility]) I48.19    Hypercapnic respiratory failure ([facility]) J96.92    Acute on chronic respiratory failure ([facility]) J96.20       Isolation/Infection:   Isolation            No Isolation          Patient Infection Status       Infection Onset Added Last Indicated Last Indicated By Review Planned Expiration Resolved Resolved By    None active    Resolved    COVID-19 (Rule Out) 11/06/22 11/06/22 11/06/22 COVID-19, Rapid (Ordered)   11/06/22 Rule-Out Test Resulted    COVID-19 (Rule Out) 10/12/22 10/12/22 10/12/22 COVID-19, Rapid (Ordered)   10/12/22 Rule-Out Test Resulted    COVID-19 (Rule Out) 10/05/22 10/05/22 10/05/22 Respiratory Panel, Molecular, with COVID-19 (Restricted: peds pts or suitable admitted adults) (Ordered)   10/05/22 Rule-Out Test Resulted    COVID-19 (Rule Out) 10/05/22 10/05/22 10/05/22 COVID-19, Rapid (Ordered)   10/05/22 Rule-Out Test Resulted    COVID-19 (Rule Out) 08/24/22 08/24/22 08/25/22 Respiratory Panel, Molecular, with COVID-19 (Restricted: peds pts or suitable admitted adults) (Ordered)   08/25/22 Rule-Out Test Resulted    COVID-19 (Rule Out) 07/16/22 07/16/22 07/16/22 Respiratory Panel, Molecular, with COVID-19 (Restricted: peds pts or suitable admitted adults) (Ordered)   07/16/22 Rule-Out Test Resulted    COVID-19 (Rule Out) 07/05/22 07/05/22 07/06/22 Respiratory Panel, Molecular, with COVID-19 (Restricted: peds pts or suitable admitted adults) (Ordered)   07/06/22 Rule-Out Test Resulted    COVID-19 (Rule Out) 05/11/22 05/11/22 05/11/22 COVID-19 & Influenza Combo (Ordered)   05/11/22 Rule-Out Test Resulted    COVID-19 (Rule Out) 03/17/22 03/17/22 03/17/22 Respiratory Panel, Molecular, with COVID-19 (Restricted: peds pts or suitable admitted adults) (Ordered)   03/17/22 Rule-Out Test Resulted    COVID-19 (Rule Out) 03/16/22 03/16/22 03/16/22 COVID-19, Rapid (Ordered)   03/16/22 Rule-Out Test Resulted    C-diff Rule Out 01/10/22 01/10/22 01/10/22 C. difficile toxin Molecular (Ordered)   03/17/22 Sis Burtoned  Tracy Webster RN 1/11/22 1300     COVID-19 (Rule Out) 01/09/22 01/09/22 01/09/22 COVID-19, Rapid (Ordered)   01/09/22 Rule-Out Test Resulted    MRSA 05/19/21 05/21/21 05/19/21 Culture, Wound   10/25/21 Kwadwo Kaplan RN    COVID-19 (Rule Out) 02/04/21 02/04/21 02/04/21 COVID-19 (Ordered)   02/04/21 Rule-Out Test Resulted            Nurse Assessment:  Last Vital Signs: BP (!) 101/59   Pulse 99   Temp 98 °F (36.7 °C)   Resp 22   Ht 5' (1.524 m)   Wt 239 lb 9.6 oz (108.7 kg)   SpO2 92%   BMI 46.79 kg/m²     Last documented pain score (0-10 scale): Pain Level: 0  Last Weight:   Wt Readings from Last 1 Encounters:   11/06/22 239 lb 9.6 oz (108.7 kg)     Mental Status:  oriented and alert    IV Access:  - None    Nursing Mobility/ADLs:  Walking   Assisted  Transfer  Assisted  Bathing  Assisted  Dressing  Assisted  Toileting  Assisted  Feeding  Assisted  Med Admin  Assisted  Med Delivery   whole    Wound Care Documentation and Therapy:  Wound 11/06/22 Pelvis Anterior; Left Stage two in Left abdominal fold (Active)   Dressing/Treatment Interdry Ag/wicking fabric with Ag 11/06/22 1930   Number of days: 0       Incision 10/05/22 Toe (Comment  which one) Right (Active)   Dressing/Treatment Open to air 11/06/22 1930   Number of days: 32        Elimination:  Continence:    Bowel: Yes  Bladder: No  Urinary Catheter: None   Colostomy/Ileostomy/Ileal Conduit: No       Date of Last BM: ***    Intake/Output Summary (Last 24 hours) at 11/7/2022 0855  Last data filed at 11/7/2022 0825  Gross per 24 hour   Intake 240 ml   Output 800 ml   Net -560 ml     I/O last 3 completed shifts:  In: -   Out: 800 [Urine:800]    Safety Concerns: At Risk for Falls    Impairments/Disabilities:      None    Nutrition Therapy:  Current Nutrition Therapy:   - Oral Diet:  Low Sodium (2gm)    Routes of Feeding: Oral  Liquids: Nectar Thick Liquids  Daily Fluid Restriction: no  Last Modified Barium Swallow with Video (Video Swallowing Test): not done    Treatments at the Time of Hospital Discharge:   Respiratory Treatments: ***  Oxygen Therapy:  is on oxygen at 2 L/min per nasal cannula.   Ventilator:    - BiPAP   IPAP: 18 cmH20, CPAP/EPAP: 6 cmH2O only when sleeping and with 2 L/min oxygen    Rehab Therapies: Physical Therapy, Occupational Therapy, and Speech/Language Therapy  Weight Bearing Status/Restrictions: No weight bearing restrictions  Other Medical Equipment (for information only, NOT a DME order):  walker  Other Treatments: ***    Patient's personal belongings (please select all that are sent with patient):  Jose    RN SIGNATURE:  Electronically signed by Brittnee Bellamy RN on 11/11/22 at 3:23 PM EST    CASE MANAGEMENT/SOCIAL WORK SECTION    Inpatient Status Date:11/11/2022    Readmission Risk Assessment Score:  Readmission Risk              Risk of Unplanned Readmission:  55           Discharging to Facility/ Agency   Name: [facility]  [facility], [address] [place] [address]  [phone]  [phone]    Dialysis Facility (if applicable)   Name:  Address:  Dialysis Schedule:  Phone:  Fax:    / signature: Electronically signed by Jory Nova RN on 11/11/22 at 3:16 PM EST    PHYSICIAN SECTION      Prognosis: Good    Condition at Discharge: Stable     Rehab Potential (if transferring to Rehab): Good    Recommended Labs or Other Treatments After Discharge: bmp in 1 week    Physician Certification: I certify the above information and transfer of Tamiko Hendrix is necessary for the continuing treatment of the diagnosis listed and that he requires Acute Rehab or skilled facility for less 30 days. Update Admission H&P: No change in H&P    PHYSICIAN SIGNATURE:  Electronically signed by Neeta Salvador MD on 11/11/22 at 2:03 PM EST        HEART FAILURE - CONGESTIVE HEART FAILURE  DISCHARGE INSTRUCTIONS:  GUIDELINES TO FOLLOW AT [facility]/SNF/ Assisted Living    Future Appointments   Date Time Provider Prateek Nassar   11/7/2022 11:15 AM [facility] CHF ROOM 1 [facility]   11/16/2022  2:30 PM Enrico Clark MD [facility] CARDIO [facility]          MEDICATIONS:  Please notify the doctor if patient is not able to take their medications or if medications are being held for any reasons (such as low blood pressure ect.)  Do not give the patient ibuprofen (Advil or Motrin), naproxen (Aleve) without talking to the doctor first. This could make their heart failure worse. WEIGHT MONITORING:   Weigh patient every day in the morning after they void (If patient is able to stand, please get a standing weight.)   Notify the doctor of a weight gain of 3 pounds or more in 1 day   OR  a total of 5 pounds or more in 1 week             DIET   Cardiac heart healthy diet:  Low sodium diet: no  more than 2,000mg (2 grams) of salt / sodium per day (which equals to a little less than  a teaspoon of salt)/ Cardiac Diet: Low saturated / low trans fat, no added salt, caffeine restricted    If patient is there for rehab and will be returning home in the near future; reinforce with the patient and the family to follow a low sodium diet (2,000 mg)- avoid using salt at the table, avoid / limit use of canned soups, processed / packaged foods, salted snacks, olives and pickles. Do not use a salt substitute without checking with the doctor. (Mrs. Brandin Velásquez is safe to use).        NOTIFY THE DOCTOR THE FIRST DAY OF ONSET OF ANY OF THESE   SYMPTOMS:   Weight gain of 3 pounds or more in 1 day         OR 5 pounds or more in one week  More shortness of breath  More swelling in stomach, legs, ankles or feet  Feeling more tired, No energy  Dry hacky cough  Dizziness  More chest pain / discomfort  Hard time breathing laying down

## 2022-11-07 NOTE — PLAN OF CARE
Problem: Discharge Planning  Goal: Discharge to home or other facility with appropriate resources  Outcome: Progressing  Flowsheets (Taken 11/6/2022 5494 by Theresa Bell, RN)  Discharge to home or other facility with appropriate resources: Identify barriers to discharge with patient and caregiver     Problem: Skin/Tissue Integrity  Goal: Absence of new skin breakdown  Description: 1. Monitor for areas of redness and/or skin breakdown  2. Assess vascular access sites hourly  3. Every 4-6 hours minimum:  Change oxygen saturation probe site  4. Every 4-6 hours:  If on nasal continuous positive airway pressure, respiratory therapy assess nares and determine need for appliance change or resting period.   Outcome: Progressing     Problem: Safety - Adult  Goal: Free from fall injury  Outcome: Progressing     Problem: ABCDS Injury Assessment  Goal: Absence of physical injury  Outcome: Progressing  Flowsheets (Taken 11/6/2022 0127 by Theresa Bell, RN)  Absence of Physical Injury: Implement safety measures based on patient assessment     Problem: Chronic Conditions and Co-morbidities  Goal: Patient's chronic conditions and co-morbidity symptoms are monitored and maintained or improved  Outcome: Progressing

## 2022-11-07 NOTE — PROGRESS NOTES
11:29 AM 11/7/2022  atient appointment rescheduled for CHF clinic due to hospitalization. Rescheduled for 11/22. After visit summary updated to reflect change.     Future Appointments   Date Time Provider Praetek Nassar   11/16/2022  2:30  [address]   11/22/2022 10:00 AM [facility] CHF ROOM [address]

## 2022-11-07 NOTE — PROGRESS NOTES
SPEECH/LANGUAGE PATHOLOGY  CLINICAL ASSESSMENT OF SWALLOWING FUNCTION   and PLAN OF CARE    PATIENT NAME:  Andrea Rodriguez  (female)     MRN:  [id number]    :  1950  (67 y.o.)  STATUS:  Inpatient: Room 8277/8679-04    TODAY'S DATE:  2022  REFERRING PROVIDER:   Samia Trujillo DO  SPECIFIC PROVIDER ORDER: speech language pathology (SLP) eval and treat Date of order:  22  REASON FOR REFERRAL: To assess oropharyngeal swallow fx s/p recent hospital admission. EVALUATING THERAPIST: ONEIL Renner                 RESULTS:    DYSPHAGIA DIAGNOSIS:   Clinical indicators of mild oral phase dysphagia  and moderate pharyngeal phase dysphagia (pharyngeal phase based on recent MBSS 10/18/22)      DIET RECOMMENDATIONS:  Soft and bite size consistency solids (IDDSI level 6) with  nectar consistency (mildly thick - IDDSI level 2) liquids, as tolerated, MEDICATION ADMINISTRATION, and Administer medication crushed, as able, with pudding/applesauce     FEEDING RECOMMENDATIONS:     Assistance level:  Supervision is needed during all oral intake      Compensatory strategies recommended: Small bites/sips, Alternate solids and liquids, Throat clear, No straw, and Head TURN to left      Discussed recommendations with nursing and/or faxed report to referring provider: Yes; informed pt's nurse re: rec for soft/bite sized and nectar thick liquids, of medication administration rec, of rec to cues during PO intake to comp swallow strategy use.      SPEECH THERAPY  PLAN OF CARE   The dysphagia POC is established based on physician order, dysphagia diagnosis and results of clinical assessment     Skilled SLP intervention for dysphagia management on acute care 3-5 x per week until goals met, pt plateaus in function and/or discharged from hospital    Conditions Requiring Skilled Therapeutic Intervention for dysphagia:    Patient is performing below functional baseline d/t  current acute condition, Multiple diagnoses, multiple medications, and increased dependency upon caregivers. Throat clearing during PO intake     Specific dysphagia interventions to include:     Compensatory strategy training   Therapeutic exercises  Trials of upgraded diet/liquid if appropriate   Meal time assessment for 1-2 sessions to provide diet modification and compensatory strategy implementation due to inconsistent episodes of clinical indicators of dysphagia during intake and need to ensure proper implementation of compensatory strategies during PO intake     Specific instructions for next treatment:  development and training of compensatory swallow strategies to improve airway protection and swallow function  Patient Treatment Goals:    Short Term Goals:  Pt will implement identified compensatory swallowing strategies on 90% of opportunities or greater to improve airway protection and swallow function. Pt will participate in ongoing mealtime assessment to provide diet modification and compensatory strategy implementation to minimize risk of aspiration associated with PO intake  If appropriate, pt will complete PO trials of upgraded diet textures with SLP only to determine the least restrictive PO diet to maintain adequate nutrition/hydration with no more than 1 overt s/s of pen/asp.   Pt will participate in meal time assessment for 1-2 sessions to provide diet modification and compensatory strategy implementation due to need to ensure proper implementation of compensatory strategies during PO intake   Pt will complete laryngeal strength/ ROM therapeutic exercises to improve airway protection for the least restrictive PO diet moderate verbal prompts  Pt will complete Effortful Swallow therapeutically to target increased oral and base of tongue pressure, increased pharyngeal constrictor contractions, and increased UES relaxation duration to reduce pharyngeal residue with moderate verbal prompts     Long Term Goals:   Pt will maintain adequate nutrition/hydration via PO intake of the least restrictive oral diet with implementation of safe swallow/ compensatory strategies and decrease signs/symptoms of aspiration to less than 1 x/day. Pt will improve oropharyngeal swallow function to ensure airway protection during PO intake to maintain adequate nutrition/hydration and decrease signs/symptoms of aspiration to less than 1 x/day. OTHER RECOMMENDATIONS:  A Video Swallow Study (MBSS) is recommended and requires a physician order IF aspiration/silent aspiration is suspected based on medical presentation     Patient/family Goal:    Did not state. Will further assess during treatment. Plan of care discussed with Patient   The Patient guardedly understand(s) the diagnosis, prognosis and plan of care     Rehabilitation Potential/Prognosis: fair                    ADMITTING DIAGNOSIS: COPD exacerbation ([facility] [address] [J44.1]  Acute on chronic respiratory failure ([facility] [address] [J96.20]  Acute on chronic respiratory failure with hypercapnia (HCC) [J96.22]  Hypercapnic respiratory failure ([address] [J96.92]    VISIT DIAGNOSIS:      PATIENT REPORT/COMPLAINT: denies difficulty swallowing  RN cleared patient for participation in assessment     yes-RN reported no swallow issues noted w/ pt to this time besides with medication administration      PRIOR LEVEL OF SWALLOW FUNCTION:    PAST HISTORY OF DYSPHAGIA?: yes; most recent MBSS 10/18/22 indicated:     \"DYSPHAGIA DIAGNOSIS:  moderate oropharyngeal phase dysphagia      DIET RECOMMENDATIONS:  Soft and bite size consistency solids (IDDSI level 6) with  nectar consistency (mildly thick - IDDSI level 2) liquids (with left head turn and no straw)\"     Home diet: Soft and bite size consistency solids (IDDSI level 6) with  nectar consistency (mildly thick - IDDSI level 2) liquids-pt questionable historian at this time; pt reported both reg texture and soft/bite sized solids and nectar at local facility.    Current Diet Order:  ADULT DIET; Regular; Low Sodium (2 gm); Mildly Thick (Nectar)    PROCEDURE:  Consistencies Administered During the Evaluation   Liquids: nectar thick liquid   Solids:  pureed foods and solid foods      Method of Intake:   cup, spoon  Self fed      Position:   Seated, upright    CLINICAL ASSESSMENT:  Oral Stage:       Dentition:  edentulous      Mildly prolonged oral stage noted with no to mild oral residue w/ reg solids, which appeared to clear with liquid wash. Pharyngeal Stage:    X1 delayed dry throat clear noted with nectar from cup (pt tolerated 3/4 sips w/o overt s/s of aspiration); pt reported delayed throat clear to be out of habit)-unclear if related to PO or not. No other signs of aspiration were noted during this evaluation (with nectar, puree, reg solids); however, silent aspiration cannot be ruled out at bedside. If silent aspiration is suspected, a Videofluoroscopic Study of Swallowing (MBS) is recommended and requires a physician order. Cognition:   Appearing Mild Confusion noted    Oral Peripheral Examination   Adequate lingual/labial strength     Current Respiratory Status     high flow nasal cannula     Parameters of Speech Production  Respiration:  Adequate for speech production  Quality:   Strained and Breathy  Intensity: Quiet    Volitional Swallow: present     Volitional Cough:   DNT    Pain: No pain reported. EDUCATION:   The Speech Language Pathologist (SLP) completed education regarding results of evaluation and that intervention is warranted at this time. Learner: Patient  Education: Reviewed results and recommendations of this evaluation, Reviewed diet and strategies, and Reviewed signs, symptoms and risks of aspiration  Evaluation of Education:  Verbalizes understanding and Needs further instruction    This plan may be re-evaluated and revised as warranted.       Evaluation Time includes thorough review of current medical information, gathering information on past medical history/social history and prior level of function, completion of standardized testing/informal observation of tasks, assessment of data and education on plan of care and goals. [x]The admitting diagnosis and active problem list, have been reviewed prior to initiation of this evaluation.         ACTIVE PROBLEM LIST:   Patient Active Problem List   Diagnosis    Depression with anxiety    Osteoporosis    Asthma    Hyperlipidemia    Mitral regurgitation    Obstructive sleep apnea syndrome    Psoriasis    Diabetes mellitus ([facility] [address]    Parkinson's disease ([facility] [address]    Primary hypertension    Microalbuminuria    Morbid obesity (HCC)    RLS (restless legs syndrome)    Generalized weakness    Inability to walk    Hypothyroidism    Chest pain    Acute asthma exacerbation    Asthma exacerbation, mild    Paroxysmal atrial fibrillation (HCC)    Atrial fibrillation with RVR (HCC)    Acute on chronic congestive heart failure ([facility] [address]    Coronary artery disease involving native coronary artery of native heart without angina pectoris    Dysphagia    Hepatosplenomegaly    Acute decompensated heart failure (HCC)    Acute diastolic (congestive) heart failure (HCC)    Nonrheumatic tricuspid valve regurgitation    Tobacco abuse    Recurrent syncope    Septic shock (HCC)    Seizure-like activity (HCC)    SHANTANU (acute kidney injury) ([facility] [address]    Pancytopenia (HCC)    Thrombocytopenia (HCC)    Encephalopathy    Acute respiratory failure with hypoxia (HCC)    Lactic acidosis    Delirium    Acute on chronic anemia    Pleural effusion, right    Acute respiratory failure with hypoxia and hypercapnia (HCC)    Acute confusion    Chronic diastolic (congestive) heart failure (HCC)    Macrocytosis    Other specified anemias    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[facility] [address]    Puerperal sepsis with acute hypoxic respiratory failure without septic shock (HCC)    Pulmonary HTN (HCC)    Chronic anticoagulation    RVF (right ventricular failure) (HCC)    Metabolic alkalosis Sepsis ([facility] [address]    COPD exacerbation ([facility] [address]    Acute on chronic respiratory failure with hypercapnia (HCC)    Persistent atrial fibrillation (HCC)    Hypercapnic respiratory failure (HCC)    Acute on chronic respiratory failure (HCC)         CPT code:  24380  bedside swallow eval    Tx note (50551): SLP educated pt re: overt s/s of aspiration in detail and on recent MBSS. SLP reviewed current diet rec for soft/bite sized and nectar thick liquids with rationale for such-pt in agreement. SLP reviewed comp swallow strats including small bites/sips, alt solids and liquids, head turn to left side; pt required cues for recall of head turn but did implement with liquid intake following review. Materials discarded following and pt left in room w/ callbell w/I reach following session.

## 2022-11-07 NOTE — PLAN OF CARE
Patient's chart updated to reflect:      . - HF care plan, HF education points and HF discharge instructions.  -Orders: 2 gram sodium diet, daily weights, I/O.  -PCP and cardiology follow up appointments to be scheduled within 7 days of hospital discharge. -CHF education session will be provided to the patient prior to hospital discharge.   Paige LAZO, RN  Heart Failure Navigator

## 2022-11-08 ENCOUNTER — APPOINTMENT (OUTPATIENT)
Dept: CT IMAGING | Age: 72
DRG: 291 | End: 2022-11-08
Payer: MEDICARE

## 2022-11-08 PROBLEM — R77.8 ELEVATED TROPONIN: Status: ACTIVE | Noted: 2022-11-08

## 2022-11-08 PROBLEM — E87.5 HYPERKALEMIA: Status: ACTIVE | Noted: 2022-01-01

## 2022-11-08 PROBLEM — I50.33 ACUTE ON CHRONIC DIASTOLIC HEART FAILURE (HCC): Status: ACTIVE | Noted: 2022-08-24

## 2022-11-08 LAB
ANION GAP SERPL CALCULATED.3IONS-SCNC: 11 MMOL/L (ref 7–16)
BASOPHILS ABSOLUTE: 0 E9/L (ref 0–0.2)
BASOPHILS RELATIVE PERCENT: 0 % (ref 0–2)
BUN BLDV-MCNC: 67 MG/DL (ref 6–23)
CALCIUM SERPL-MCNC: 8.5 MG/DL (ref 8.6–10.2)
CHLORIDE BLD-SCNC: 92 MMOL/L (ref 98–107)
CO2: 34 MMOL/L (ref 22–29)
CREAT SERPL-MCNC: 1.9 MG/DL (ref 0.5–1)
EKG ATRIAL RATE: 108 BPM
EKG Q-T INTERVAL: 330 MS
EKG QRS DURATION: 74 MS
EKG QTC CALCULATION (BAZETT): 450 MS
EKG R AXIS: 95 DEGREES
EKG T AXIS: 47 DEGREES
EKG VENTRICULAR RATE: 112 BPM
EOSINOPHILS ABSOLUTE: 0 E9/L (ref 0.05–0.5)
EOSINOPHILS RELATIVE PERCENT: 0 % (ref 0–6)
GFR SERPL CREATININE-BSD FRML MDRD: 28 ML/MIN/1.73
GLUCOSE BLD-MCNC: 252 MG/DL (ref 74–99)
HCT VFR BLD CALC: 29.8 % (ref 34–48)
HEMOGLOBIN: 8.5 G/DL (ref 11.5–15.5)
HYPOCHROMIA: ABNORMAL
LYMPHOCYTES ABSOLUTE: 0.32 E9/L (ref 1.5–4)
LYMPHOCYTES RELATIVE PERCENT: 7 % (ref 20–42)
MCH RBC QN AUTO: 26.3 PG (ref 26–35)
MCHC RBC AUTO-ENTMCNC: 28.5 % (ref 32–34.5)
MCV RBC AUTO: 92.3 FL (ref 80–99.9)
METER GLUCOSE: 231 MG/DL (ref 74–99)
METER GLUCOSE: 323 MG/DL (ref 74–99)
METER GLUCOSE: 425 MG/DL (ref 74–99)
METER GLUCOSE: 439 MG/DL (ref 74–99)
METER GLUCOSE: 465 MG/DL (ref 74–99)
METER GLUCOSE: 477 MG/DL (ref 74–99)
METER GLUCOSE: >500 MG/DL (ref 74–99)
MONOCYTES ABSOLUTE: 0.14 E9/L (ref 0.1–0.95)
MONOCYTES RELATIVE PERCENT: 2.6 % (ref 2–12)
NEUTROPHILS ABSOLUTE: 4.05 E9/L (ref 1.8–7.3)
NEUTROPHILS RELATIVE PERCENT: 90.4 % (ref 43–80)
NUCLEATED RED BLOOD CELLS: 0.9 /100 WBC
OVALOCYTES: ABNORMAL
PDW BLD-RTO: 15.2 FL (ref 11.5–15)
PLATELET # BLD: 143 E9/L (ref 130–450)
PMV BLD AUTO: 10.1 FL (ref 7–12)
POIKILOCYTES: ABNORMAL
POTASSIUM SERPL-SCNC: 5.2 MMOL/L (ref 3.5–5)
RBC # BLD: 3.23 E12/L (ref 3.5–5.5)
SODIUM BLD-SCNC: 137 MMOL/L (ref 132–146)
TARGET CELLS: ABNORMAL
TEAR DROP CELLS: ABNORMAL
WBC # BLD: 4.5 E9/L (ref 4.5–11.5)

## 2022-11-08 PROCEDURE — 85025 COMPLETE CBC W/AUTO DIFF WBC: CPT

## 2022-11-08 PROCEDURE — 2060000000 HC ICU INTERMEDIATE R&B

## 2022-11-08 PROCEDURE — 2580000003 HC RX 258: Performed by: INTERNAL MEDICINE

## 2022-11-08 PROCEDURE — 97161 PT EVAL LOW COMPLEX 20 MIN: CPT | Performed by: PHYSICAL THERAPIST

## 2022-11-08 PROCEDURE — 94660 CPAP INITIATION&MGMT: CPT

## 2022-11-08 PROCEDURE — 6370000000 HC RX 637 (ALT 250 FOR IP): Performed by: INTERNAL MEDICINE

## 2022-11-08 PROCEDURE — 6360000002 HC RX W HCPCS: Performed by: INTERNAL MEDICINE

## 2022-11-08 PROCEDURE — 82962 GLUCOSE BLOOD TEST: CPT

## 2022-11-08 PROCEDURE — 80048 BASIC METABOLIC PNL TOTAL CA: CPT

## 2022-11-08 PROCEDURE — 99233 SBSQ HOSP IP/OBS HIGH 50: CPT | Performed by: INTERNAL MEDICINE

## 2022-11-08 PROCEDURE — 2700000000 HC OXYGEN THERAPY PER DAY

## 2022-11-08 PROCEDURE — 36415 COLL VENOUS BLD VENIPUNCTURE: CPT

## 2022-11-08 PROCEDURE — 71250 CT THORAX DX C-: CPT

## 2022-11-08 PROCEDURE — 93010 ELECTROCARDIOGRAM REPORT: CPT | Performed by: INTERNAL MEDICINE

## 2022-11-08 PROCEDURE — 99232 SBSQ HOSP IP/OBS MODERATE 35: CPT | Performed by: INTERNAL MEDICINE

## 2022-11-08 PROCEDURE — 2500000003 HC RX 250 WO HCPCS: Performed by: INTERNAL MEDICINE

## 2022-11-08 PROCEDURE — 94640 AIRWAY INHALATION TREATMENT: CPT

## 2022-11-08 PROCEDURE — 6370000000 HC RX 637 (ALT 250 FOR IP)

## 2022-11-08 RX ORDER — INSULIN LISPRO 100 [IU]/ML
0-4 INJECTION, SOLUTION INTRAVENOUS; SUBCUTANEOUS NIGHTLY
Status: DISCONTINUED | OUTPATIENT
Start: 2022-11-08 | End: 2022-11-12 | Stop reason: HOSPADM

## 2022-11-08 RX ORDER — INSULIN LISPRO 100 [IU]/ML
0-8 INJECTION, SOLUTION INTRAVENOUS; SUBCUTANEOUS
Status: DISCONTINUED | OUTPATIENT
Start: 2022-11-09 | End: 2022-11-12 | Stop reason: HOSPADM

## 2022-11-08 RX ORDER — INSULIN LISPRO 100 [IU]/ML
4 INJECTION, SOLUTION INTRAVENOUS; SUBCUTANEOUS ONCE
Status: DISCONTINUED | OUTPATIENT
Start: 2022-11-08 | End: 2022-11-09 | Stop reason: SDUPTHER

## 2022-11-08 RX ORDER — INSULIN LISPRO 100 [IU]/ML
0-8 INJECTION, SOLUTION INTRAVENOUS; SUBCUTANEOUS
Status: DISCONTINUED | OUTPATIENT
Start: 2022-11-08 | End: 2022-11-08

## 2022-11-08 RX ORDER — LORAZEPAM 0.5 MG/1
0.5 TABLET ORAL ONCE
Status: COMPLETED | OUTPATIENT
Start: 2022-11-08 | End: 2022-11-08

## 2022-11-08 RX ADMIN — IPRATROPIUM BROMIDE AND ALBUTEROL SULFATE 1 AMPULE: .5; 2.5 SOLUTION RESPIRATORY (INHALATION) at 18:39

## 2022-11-08 RX ADMIN — DOXYCYCLINE 100 MG: 100 INJECTION, POWDER, LYOPHILIZED, FOR SOLUTION INTRAVENOUS at 04:56

## 2022-11-08 RX ADMIN — BUDESONIDE 500 MCG: 0.5 SUSPENSION RESPIRATORY (INHALATION) at 18:39

## 2022-11-08 RX ADMIN — FUROSEMIDE 40 MG: 10 INJECTION, SOLUTION INTRAMUSCULAR; INTRAVENOUS at 21:16

## 2022-11-08 RX ADMIN — APIXABAN 5 MG: 5 TABLET, FILM COATED ORAL at 21:16

## 2022-11-08 RX ADMIN — CARBIDOPA AND LEVODOPA 2 TABLET: 25; 100 TABLET, EXTENDED RELEASE ORAL at 21:15

## 2022-11-08 RX ADMIN — ATORVASTATIN CALCIUM 20 MG: 20 TABLET, FILM COATED ORAL at 21:15

## 2022-11-08 RX ADMIN — PANTOPRAZOLE SODIUM 40 MG: 40 TABLET, DELAYED RELEASE ORAL at 12:19

## 2022-11-08 RX ADMIN — SODIUM CHLORIDE, PRESERVATIVE FREE 10 ML: 5 INJECTION INTRAVENOUS at 09:41

## 2022-11-08 RX ADMIN — AMIODARONE HYDROCHLORIDE 200 MG: 200 TABLET ORAL at 12:20

## 2022-11-08 RX ADMIN — DIVALPROEX SODIUM 250 MG: 250 TABLET, DELAYED RELEASE ORAL at 21:15

## 2022-11-08 RX ADMIN — FUROSEMIDE 40 MG: 10 INJECTION, SOLUTION INTRAMUSCULAR; INTRAVENOUS at 11:22

## 2022-11-08 RX ADMIN — ONDANSETRON 4 MG: 2 INJECTION INTRAMUSCULAR; INTRAVENOUS at 09:41

## 2022-11-08 RX ADMIN — ANTI-FUNGAL POWDER MICONAZOLE NITRATE TALC FREE 1 EACH: 1.42 POWDER TOPICAL at 21:19

## 2022-11-08 RX ADMIN — ROPINIROLE 1 MG: 1 TABLET, FILM COATED ORAL at 21:15

## 2022-11-08 RX ADMIN — IPRATROPIUM BROMIDE AND ALBUTEROL SULFATE 1 AMPULE: .5; 2.5 SOLUTION RESPIRATORY (INHALATION) at 14:18

## 2022-11-08 RX ADMIN — LORAZEPAM 0.5 MG: 0.5 TABLET ORAL at 21:16

## 2022-11-08 RX ADMIN — INSULIN LISPRO 4 UNITS: 100 INJECTION, SOLUTION INTRAVENOUS; SUBCUTANEOUS at 21:16

## 2022-11-08 RX ADMIN — CARBIDOPA AND LEVODOPA 2 TABLET: 25; 100 TABLET, EXTENDED RELEASE ORAL at 12:20

## 2022-11-08 RX ADMIN — SUCRALFATE 1 G: 1 TABLET ORAL at 12:19

## 2022-11-08 RX ADMIN — INSULIN GLARGINE 13 UNITS: 100 INJECTION, SOLUTION SUBCUTANEOUS at 21:16

## 2022-11-08 RX ADMIN — INSULIN LISPRO 4 UNITS: 100 INJECTION, SOLUTION INTRAVENOUS; SUBCUTANEOUS at 22:33

## 2022-11-08 RX ADMIN — METHYLPREDNISOLONE SODIUM SUCCINATE 30 MG: 40 INJECTION, POWDER, FOR SOLUTION INTRAMUSCULAR; INTRAVENOUS at 13:58

## 2022-11-08 RX ADMIN — SUCRALFATE 1 G: 1 TABLET ORAL at 21:15

## 2022-11-08 RX ADMIN — SUCRALFATE 1 G: 1 TABLET ORAL at 16:40

## 2022-11-08 RX ADMIN — ONDANSETRON 4 MG: 2 INJECTION INTRAMUSCULAR; INTRAVENOUS at 15:51

## 2022-11-08 RX ADMIN — BUDESONIDE 500 MCG: 0.5 SUSPENSION RESPIRATORY (INHALATION) at 06:22

## 2022-11-08 RX ADMIN — LORAZEPAM 0.5 MG: 0.5 TABLET ORAL at 01:01

## 2022-11-08 RX ADMIN — APIXABAN 5 MG: 5 TABLET, FILM COATED ORAL at 12:20

## 2022-11-08 RX ADMIN — DIVALPROEX SODIUM 250 MG: 250 TABLET, DELAYED RELEASE ORAL at 12:20

## 2022-11-08 RX ADMIN — ROPINIROLE 1 MG: 1 TABLET, FILM COATED ORAL at 12:19

## 2022-11-08 RX ADMIN — METHYLPREDNISOLONE SODIUM SUCCINATE 30 MG: 40 INJECTION, POWDER, FOR SOLUTION INTRAMUSCULAR; INTRAVENOUS at 21:20

## 2022-11-08 RX ADMIN — IPRATROPIUM BROMIDE AND ALBUTEROL SULFATE 1 AMPULE: .5; 2.5 SOLUTION RESPIRATORY (INHALATION) at 06:22

## 2022-11-08 RX ADMIN — DOXYCYCLINE 100 MG: 100 INJECTION, POWDER, LYOPHILIZED, FOR SOLUTION INTRAVENOUS at 15:48

## 2022-11-08 RX ADMIN — METHYLPREDNISOLONE SODIUM SUCCINATE 30 MG: 40 INJECTION, POWDER, FOR SOLUTION INTRAMUSCULAR; INTRAVENOUS at 06:41

## 2022-11-08 RX ADMIN — SODIUM CHLORIDE, PRESERVATIVE FREE 10 ML: 5 INJECTION INTRAVENOUS at 21:20

## 2022-11-08 RX ADMIN — INSULIN GLARGINE 13 UNITS: 100 INJECTION, SOLUTION SUBCUTANEOUS at 12:21

## 2022-11-08 ASSESSMENT — PAIN SCALES - WONG BAKER
WONGBAKER_NUMERICALRESPONSE: 0

## 2022-11-08 ASSESSMENT — ENCOUNTER SYMPTOMS
EYES NEGATIVE: 1
ALLERGIC/IMMUNOLOGIC NEGATIVE: 1
SHORTNESS OF BREATH: 1
GASTROINTESTINAL NEGATIVE: 1
COUGH: 1

## 2022-11-08 ASSESSMENT — PAIN SCALES - GENERAL
PAINLEVEL_OUTOF10: 0
PAINLEVEL_OUTOF10: 0

## 2022-11-08 NOTE — PROGRESS NOTES
Admitting Date and Time: 11/6/2022 10:01 AM  Admit Dx: [facility] exacerb[facility] [J44.1]  Acute on chronic respiratory failure (HCC) [J96.20]  Acute on chronic respiratory failure with hypercapnia (HCC) [J96.22]  Hypercapnic respiratory failure (HCC) [J96.92]    Subjective:    Dyspnea further improved      ROS: denies fever, chills, cp, sob, n/v, HA unless stated above.      amiodarone  200 mg Oral Daily    apixaban  5 mg Oral BID    doxycycline (VIBRAMYCIN) IV  100 mg IntraVENous Q12H    methylPREDNISolone  30 mg IntraVENous 3 times per day    atorvastatin  20 mg Oral Nightly    budesonide  0.5 mg Nebulization BID    carbidopa-levodopa  2 tablet Oral TID    divalproex  250 mg Oral BID    insulin glargine  13 Units SubCUTAneous BID    miconazole  1 each Topical BID    pantoprazole  40 mg Oral QAM    rOPINIRole  1 mg Oral TID    sucralfate  1 g Oral 4x Daily    sodium chloride flush  5-40 mL IntraVENous 2 times per day    furosemide  40 mg IntraVENous BID    ipratropium-albuterol  1 ampule Inhalation Q4H WA     glucose, 4 tablet, PRN  dextrose bolus, 125 mL, PRN   Or  dextrose bolus, 250 mL, PRN  glucagon (rDNA), 1 mg, PRN  dextrose, , Continuous PRN  acetaminophen, 650 mg, Q4H PRN  docusate sodium, 100 mg, BID PRN  sodium chloride flush, 5-40 mL, PRN  sodium chloride, , PRN  ondansetron, 4 mg, Q8H PRN   Or  ondansetron, 4 mg, Q6H PRN  polyethylene glycol, 17 g, Daily PRN  acetaminophen, 650 mg, Q6H PRN   Or  acetaminophen, 650 mg, Q6H PRN       Objective:    /63   Pulse 94   Temp 97.5 °F (36.4 °C)   Resp 18   Ht 5' (1.524 m)   Wt 240 lb 9 oz (109.1 kg)   SpO2 96%   BMI 46.98 kg/m²   General Appearance: alert and oriented to person, place and time and in no acute distress  Skin: warm and dry  Head: normocephalic and atraumatic  Eyes: pupils equal, round, and reactive to light, extraocular eye movements intact, conjunctivae normal  Neck: neck supple and non tender without mass   Pulmonary/Chest: clear to auscultation bilaterally- no wheezes, rales or rhonchi, normal air movement, no respiratory distress  Cardiovascular: normal rate, normal S1 and S2 and no carotid bruits  Abdomen: soft, non-tender, non-distended, normal bowel sounds, no masses or organomegaly  Extremities: no cyanosis, no clubbing and   Mild / trace edema  Neurologic: no cranial nerve deficit and speech normal      Recent Labs     11/06/22  1036 11/07/22  1441 11/08/22  0546    139 137   K 5.1* 5.5* 5.2*   CL 96* 96* 92*   CO2 36* 35* 34*   BUN 61* 65* 67*   CREATININE 1.9* 1.9* 1.9*   GLUCOSE 213* 181* 252*   CALCIUM 8.9 8.8 8.5*         Recent Labs     11/07/22  1441   ALKPHOS 74   PROT 5.3*   LABALBU 3.3*   BILITOT 0.4   AST 7   ALT <5         Recent Labs     11/06/22  1036 11/07/22  1441 11/08/22  0546   WBC 7.1 7.6 4.5   RBC 3.63 3.67 3.23*   HGB 9.5* 9.3* 8.5*   HCT 34.6 34.2 29.8*   MCV 95.3 93.2 92.3   MCH 26.2 25.3* 26.3   MCHC 27.5* 27.2* 28.5*   RDW 15.8* 15.4* 15.2*    193 143   MPV 11.3 10.6 10.1             Radiology:   CT CHEST WO CONTRAST   Final Result   Bilateral pleural effusions large on the right and small on the left with   collapse of the right middle and lower lobe. Four-chamber cardiac   enlargement with enlargement of the pulmonary arteries to suggest pulmonary   arterial hypertension and small pericardial effusion. Findings raise a   concern for possible failure. XR CHEST PORTABLE   Final Result   1. Small to moderate right pleural effusion   2. Right lung base atelectasis   3.  Cardiomegaly             Assessment:  Active Problems:    Acute decompensated heart failure (HCC)    Acute kidney injury ([facility] [address]    Acute respiratory failure with hypoxia and hypercapnia (HCC)    Acute on chronic diastolic heart failure (HCC)    Acute on chronic respiratory failure (HCC)    Elevated troponin    Hyperkalemia    Depression with anxiety    Hyperlipidemia    Obstructive sleep apnea syndrome    Atrial fibrillation with RVR ([facility] [address]  Resolved Problems:    * No resolved hospital problems. *      Plan: History of present illness from History and Physical:  The patient is a 67 y.o. female with significant past medical history of CHF, A. Fib, Hypertension Essential, Hx of Breast Ca who presents with weakness and lower extremity swelling. She noted that for the last 2 days she is feeling more tired this has progressively gotten worse started today. She was admitted to increased lower extremity edema. She denied fever chills no nausea no vomiting no cough. In the ER she was found to be lethargic. ABG was suggestive of hypercapnia. Her pH was 7.24, PCO2 was 80. She was placed on BiPAP and admitted for further evaluation. In the ER she was also noted to have irregular heartbeat with heart rate persistently above 100 with high of 130s and low of 104. Blood pressure was also low. She is being admitted for further management. EKG in the ER shows A. fib with RVR. Due to fluid overload and uncontrolled atrial fibrillation she was given loading dose of digoxin and additional 2 doses of every 6 hours after the initial dose. Decompensated acute on chronic respiratory failure: Volume overload. Input by pulmonary appreciated who has her on antibiotics. Acute decompensated diastolic heart failure. Initially with hypotension therefore diuretics was not given it initially. Cardiology consulted. A. fib rapid ventricular response was on amiodarone at home initially held here then started on digoxin .   Cardiology restarted amiodarone  Obstructive sleep apnea BiPAP ordered & recently compliant  obesity needs lifestyle modification follow-up as an outpatient  copD continue current care not in exacerbation  Type II diabetes sliding scale diabetic diet current insulin regimen  Hyperlipidemia on statin  Skin fold rash nystatin powder  Insomnia Ambien initially on hold due to hypercapnia and I will continue to hold it due to the following:  Dementia possibly concerns continue Sinemet  DVT prophylaxis: Eliquis  Full code. Disposition: Would likely benefit from placement      ATTESTATION:    NOTE: This report was transcribed using voice recognition software. Every effort was made to ensure accuracy; however, inadvertent computerized transcription errors may be present.      Electronically signed by Saritha Gallego MD on 11/8/2022 at 5:12 PM

## 2022-11-08 NOTE — PROGRESS NOTES
Physical Therapy    Physical Therapy Initial Evaluation/Plan of Care    Room #:  8361/2168-31  Patient Name: Nikia Obregon  YOB: 1950  MRN: [id number]    Date of Service: 11/8/2022     Tentative placement recommendation: [facility] with Physical Therapy   Equipment recommendation: Equipment at [address] Physical Therapist: Jamee Ruiz  #[id number]      Specific Provider Orders/Date/Referring Provider :  11/07/22 1445    PT eval and treat  Start:  11/07/22 1445,   End:  11/07/22 1445,   ONE TIME,   Standing Count:  1 Occurrences,   R         Kasey Corbett DO     Admitting Diagnosis:   COPD exacerbation ([address] [J44.1]  Acute on chronic respiratory failure ([address] [J96.20]  Acute on chronic respiratory failure with hypercapnia ([address] [J96.22]  Hypercapnic respiratory failure ([address] [J96.92]    Admitted with    shortness of breath bilateral lower extremities swelling  Surgery: none      Patient Active Problem List   Diagnosis    Depression with anxiety    Osteoporosis    Asthma    Hyperlipidemia    Mitral regurgitation    Obstructive sleep apnea syndrome    Psoriasis    Diabetes mellitus ([address]    Parkinson's disease ([address]    Primary hypertension    Microalbuminuria    Morbid obesity ([address]    RLS (restless legs syndrome)    Generalized weakness    Inability to walk    Hypothyroidism    Chest pain    Acute asthma exacerbation    Asthma exacerbation, mild    Paroxysmal atrial fibrillation (HCC)    Atrial fibrillation with RVR ([address]    Acute on chronic congestive heart failure ([address]    Coronary artery disease involving native coronary artery of native heart without angina pectoris    Dysphagia    Hepatosplenomegaly    Acute decompensated heart failure (HCC)    Acute diastolic (congestive) heart failure ([address]    Nonrheumatic tricuspid valve regurgitation    Tobacco abuse    Recurrent syncope    Septic shock ([address]    Seizure-like activity ([address]    Acute kidney injury ([address] Pancytopenia (HCC)    Thrombocytopenia (HCC)    Encephalopathy    Acute respiratory failure with hypoxia (HCC)    Lactic acidosis    Delirium    Acute on chronic anemia    Pleural effusion, right    Acute respiratory failure with hypoxia and hypercapnia (HCC)    Acute confusion    Acute on chronic diastolic heart failure (HCC)    Macrocytosis    Other specified anemias    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[facility]    Puerperal sepsis with acute hypoxic respiratory failure without septic shock (HCC)    Pulmonary HTN (HCC)    Chronic anticoagulation    RVF (right ventricular failure) (HCC)    Metabolic alkalosis    Sepsis (HCC)    COPD exacerbation (HCC)    Acute on chronic respiratory failure with hypercapnia (HCC)    Persistent atrial fibrillation (HCC)    Hypercapnic respiratory failure (HCC)    Acute on chronic respiratory failure (HCC)    Elevated troponin    Hyperkalemia        ASSESSMENT of Current Deficits Patient exhibits decreased strength, balance, and endurance impairing functional mobility, transfers, gait , gait distance, and tolerance to activity are barriers to d/c and require skilled intervention during hospital stay to impact mobility and tolerance to standing. Decreased strength, balance and endurance  increases patient's risk for fall. PHYSICAL THERAPY  PLAN OF CARE       Physical therapy plan of care is established based on physician order,  patient diagnosis and clinical assessment    Current Treatment Recommendations:    -Bed Mobility: Lower extremity exercises , Upper extremity exercises , and Trunk control activities   -Standing Balance: Perform strengthening exercises in standing to promote motor control with or without upper extremity support   -Transfers: Provide instruction on proper hand and foot position for adequate transfer of weight onto lower extremities and use of gait device if needed and Cues for hand placement, technique and safety.  Provide stabilization to prevent fall -Gait: Gait training and Standing activities to improve: base of support, weight shift, weight bearing    -Endurance: Utilize Supervised activities to increase level of endurance to allow for safe functional mobility including transfers and gait   -Instruction in independent management of O2 line    PT long term treatment goals are located in below grid    Patient and or family understand(s) diagnosis, prognosis, and plan of care. Frequency of treatments: Patient will be seen  daily.          Prior Level of Function: Patient ambulated with wheeled walker short distance, wheelchair user  Rehab Potential: good    for baseline    Past medical history:   Past Medical History:   Diagnosis Date    A-fib ([facility]     Acute on chronic congestive heart failure ([facility]     Anxiety     Asthma     CAD (coronary artery disease) 01/21/2016    Cancer ([facility]  breast ca 2006    right lumpectomy    Chronic kidney disease     nephrolithiasis    COPD exacerbation ([facility] 10/12/2022    Depression     Diabetes mellitus ([facility]     H/O mammogram     Hx MRSA infection     toe infection january 2012    Hyperlipidemia     Hypertension     Lateral epicondylitis     Morbid obesity ([facility]     SERENA on CPAP     Oxygen dependent     Parkinson's disease ([facility]     Tubal ligation status      Past Surgical History:   Procedure Laterality Date    BREAST LUMPECTOMY      BREAST REDUCTION SURGERY      CARDIAC CATHETERIZATION  4/28/2014    Dr. Gilma Cavazos  01/11/2022    Dr. Rachael Coulter  7/29/15    CT GUIDED CHEST TUBE  7/8/2022    CT GUIDED CHEST TUBE 7/8/2022 Syeda Wilson MD [facility]    ENDOSCOPY, COLON, DIAGNOSTIC  7/19/15    GALLBLADDER SURGERY      LUMBAR LAMINECTOMY      TOE AMPUTATION      TONSILLECTOMY      UPPER GASTROINTESTINAL ENDOSCOPY      UPPER GASTROINTESTINAL ENDOSCOPY N/A 7/19/2022    EGD ESOPHAGOGASTRODUODENOSCOPY performed by Gurmeet Winston MD at [address]:    Precautions: Up as tolerated and Continuous Pulse Oximetry , falls, alarm, and O2 ,  5 Liters of o2 via nasal cannula     Social history: Patient lives in a skilled nursing facility      Equipment owned: Wheelchair, [address] bed,   cane, [address]   How much difficulty turning over in bed?: A Lot  How much difficulty sitting down on / standing up from a chair with arms?: A Lot  How much difficulty moving from lying on back to sitting on side of bed?: A Lot  How much help from another person moving to and from a bed to a chair?: A Lot  How much help from another person needed to walk in hospital room?: A Lot  How much help from another person for climbing 3-5 steps with a railing?: Total  AM-PAC Inpatient Mobility Raw Score : 11  AM-PAC Inpatient T-Scale Score : 33.86  Mobility Inpatient CMS 0-100% Score: 72.57  Mobility Inpatient CMS G-Code Modifier : CL    Nursing cleared patient for PT evaluation. The admitting diagnosis and active problem list as listed above have been reviewed prior to the initiation of this evaluation. OBJECTIVE;   Initial Evaluation  Date: 11/8/2022 Treatment Date:     Short Term/ Long Term   Goals   Was pt agreeable to Eval/treatment? Yes  To be met in 3 days   Pain level   0/10        Bed Mobility    Rolling: Moderate assist of 1    Supine to sit: Moderate assist of 1    Sit to supine: Not assessed patient in chair    Scooting: Minimal assist of 1    Rolling: Minimal assist of 1    Supine to sit: Minimal assist of 1    Sit to supine: Minimal assist of 1    Scooting: Minimal assist of 1     Transfers Sit to stand:  Moderate assist of 1 x 3 reps   Sit to stand: Supervision      Ambulation     3x5 forward/backward steps using  wheeled walker with Minimal assist of 1   cues for upright posture, walker approximation, safety, proper hand placement, and pacing    15 feet using  wheeled walker with Minimal assist of 1    ROM Within functional limits    Increase range of motion 10% of affected joints    Strength BUE:   3/5  RLE:  3/5  LLE:  3/5  Increase strength in affected mm groups by 1/3 grade   Balance Sitting EOB:  fair    Dynamic Standing:  fair with wheeled walker   Sitting EOB:  good    Dynamic Standing: good with wheeled walker      Patient is Alert & Oriented x person, place, time, and situation and follows one step directions    Sensation:  Patient  reports numbness/tingling bilateral lower extremities     Edema:  yes bilateral lower extremities   Endurance: poor      Vitals:  5 liters nasal cannula   Blood Pressure at rest  Blood Pressure during session    Heart Rate at rest   Heart Rate during session     SPO2 at rest 99%  SPO2 during session 80-91% however recovers quickly at rest to 99%     Patient education  Patient educated on role of Physical Therapy, risks of immobility, safety and plan of care and  importance of mobility while in hospital      Patient response to education:   Pt verbalized understanding Pt demonstrated skill Pt requires further education in this area   Yes Partial Yes      Treatment:  Patient practiced and was instructed/facilitated in the following treatment: Patient   Sat edge of bed 5 minutes with Minimal assist of 1 to increase dynamic sitting balance and activity tolerance. seated and standing challenges with continuous monitoring of vitals     Therapeutic Exercises:  not performed        At end of session, patient in chair with  call light and phone within reach,  all lines and tubes intact, nursing notified. Patient would benefit from continued skilled Physical Therapy to improve functional independence and quality of life.          Patient's/ family goals   get stronger    Time in  1150  Time out  1210     Total Treatment Time  0 minutes    Evaluation time includes thorough review of current medical information, gathering information on past medical history/social history and prior level of function, completion of standardized testing/informal observation of tasks, assessment of data, and development of Plan of care and goals.      CPT codes:  Low Complexity PT evaluation (20227)  No treatment billed    Star Carpio PT

## 2022-11-08 NOTE — PROGRESS NOTES
Pt was complaining of nausea. So she was given Zofran before her AM meds. Went back to give medications and pt was off the floor. Ct scan came back concerning. Dr Jd Mancuso and Dr Chris Chatman both informed. Will await further orders.

## 2022-11-08 NOTE — PROGRESS NOTES
Patient demanded an ativan in order for her wear her bipap at night. Physician notified, received a one time dose.

## 2022-11-08 NOTE — PROGRESS NOTES
Physician Progress Note      PATIENT:               Cherie Lopez  CSN #:                  [id number]  :                       1950  ADMIT DATE:       2022 10:01 AM  DISCH DATE:  RESPONDING  PROVIDER #:        Fiordaliza Ramos MD        QUERY TEXT:    Stage of Chronic Kidney Disease: Please provide further specificity, if known. Clinical indicators include: bnp, creatinine, probnp, chronic kidney disease,   bun, brain natriuretic peptide, pro-bnp, ckd, gfr, kd, cr  Options provided:  -- Chronic kidney disease stage 1  -- Chronic kidney disease stage 2  -- Chronic kidney disease stage 3  -- Chronic kidney disease stage 3a  -- Chronic kidney disease stage 3b  -- Chronic kidney disease stage 4  -- Chronic kidney disease stage 5  -- Chronic kidney disease stage 5, requiring dialysis  -- End stage renal disease  -- Other - I will add my own diagnosis  -- Disagree - Not applicable / Not valid  -- Disagree - Clinically Unable to determine / Unknown        PROVIDER RESPONSE TEXT:    The patient has chronic kidney disease stage 1. Electronically signed by:   Fiordaliza Ramos MD 2022 3:39 PM

## 2022-11-08 NOTE — PROGRESS NOTES
Patient is seen in follow-up for CHF. Subjective:     Ms. Dima Benton feels better today  Sitting up in bed no apparent distress    ROS:  CONSTITUTIONAL:  negative for  fevers, chills  HEENT:  negative for earaches, nasal congestion and epistaxis  RESPIRATORY:  negative for  dry cough, cough with sputum,wheezing and hemoptysis  GASTROINTESTINAL:  negative for nausea, vomiting  MUSCULOSKELETAL:  negative for  myalgias, arthralgias  NEUROLOGICAL:  negative for visual disturbance, dysphagia    Medication side effects: none    Scheduled Meds:   amiodarone  200 mg Oral Daily    apixaban  5 mg Oral BID    doxycycline (VIBRAMYCIN) IV  100 mg IntraVENous Q12H    methylPREDNISolone  30 mg IntraVENous 3 times per day    atorvastatin  20 mg Oral Nightly    budesonide  0.5 mg Nebulization BID    carbidopa-levodopa  2 tablet Oral TID    divalproex  250 mg Oral BID    insulin glargine  13 Units SubCUTAneous BID    miconazole  1 each Topical BID    pantoprazole  40 mg Oral QAM    rOPINIRole  1 mg Oral TID    sucralfate  1 g Oral 4x Daily    sodium chloride flush  5-40 mL IntraVENous 2 times per day    furosemide  40 mg IntraVENous BID    ipratropium-albuterol  1 ampule Inhalation Q4H WA     Continuous Infusions:   dextrose      sodium chloride       PRN Meds:glucose, dextrose bolus **OR** dextrose bolus, glucagon (rDNA), dextrose, acetaminophen, docusate sodium, sodium chloride flush, sodium chloride, ondansetron **OR** ondansetron, polyethylene glycol, acetaminophen **OR** acetaminophen    I/O last 3 completed shifts:   In: 480 [P.O.:480]  Out: 3000 [Urine:3000]  I/O this shift:  In: 360 [P.O.:360]  Out: -       Objective:      Physical Exam:   /63   Pulse 94   Temp 97.5 °F (36.4 °C)   Resp 18   Ht 5' (1.524 m)   Wt 240 lb 9 oz (109.1 kg)   SpO2 96%   BMI 46.98 kg/m²   CONSTITUTIONAL:  awake, alert, cooperative, no apparent distress, and appears stated age  HEAD:  normocepalic, without obvious abnormality, atraumatic  NECK:  Supple, symmetrical, trachea midline, no adenopathy, thyroid symmetric, not enlarged and no tenderness, skin normal  LUNGS:  No increased work of breathing, No accessory muscle use or intercostal retractions, decreased air exchange, bilateral rales  CARDIOVASCULAR:  Normal apical impulse, irregularly irregular, normal S1 and S2, no S3, 3/6 systolic murmur at the left lower sternal border,, no edema, no JVD, no carotid bruit. ABDOMEN:  Soft, nontender, no masses, no hepatomegaly, no splenomegaly, BS+  MUSCULOSKELETAL:  No clubbing no cyanosis. there is no redness, warmth, or swelling of the joints  full range of motion noted  NEUROLOGIC:  Alert, awake,oriented x3  SKIN:  no bruising or bleeding, normal skin color, texture, turgor and no redness, warmth, or swelling      Cardiographics  I personally reviewed the telemetry monitor strips with the following interpretation: Atrial fibrillation with controlled ventricular response    Echocardiogram: 7/7/2022,Summary   Technically difficult study - limited visualization. Micro-bubble contrast injected to enhance left ventricular visualization. Normal left ventricular size and systolic function. Ejection fraction is visually estimated at 70-75%. Indeterminate diastolic function. No regional wall motion abnormalities seen. Mild left ventricular concentric hypertrophy noted. Moderately dilated right ventricle with reduced function. Biatrial dilation. Mild tricuspid regurgitation. RVSP is at least 60 mmHg. Prominent pericardial fat pad. No definitive evidence of pericardial effusion. Imaging  CT CHEST WO CONTRAST   Final Result   Bilateral pleural effusions large on the right and small on the left with   collapse of the right middle and lower lobe. Four-chamber cardiac   enlargement with enlargement of the pulmonary arteries to suggest pulmonary   arterial hypertension and small pericardial effusion.   Findings raise a   concern for possible failure. XR CHEST PORTABLE   Final Result   1. Small to moderate right pleural effusion   2. Right lung base atelectasis   3. Cardiomegaly             Lab Review   Lab Results   Component Value Date/Time     11/08/2022 05:46 AM    K 5.2 11/08/2022 05:46 AM    K 5.5 11/07/2022 02:41 PM    CL 92 11/08/2022 05:46 AM    CO2 34 11/08/2022 05:46 AM    BUN 67 11/08/2022 05:46 AM    CREATININE 1.9 11/08/2022 05:46 AM    GLUCOSE 252 11/08/2022 05:46 AM    CALCIUM 8.5 11/08/2022 05:46 AM     Lab Results   Component Value Date/Time    WBC 4.5 11/08/2022 05:46 AM    HGB 8.5 11/08/2022 05:46 AM    HCT 29.8 11/08/2022 05:46 AM    MCV 92.3 11/08/2022 05:46 AM     11/08/2022 05:46 AM     I have personally reviewed the laboratory, cardiac diagnostic and radiographic testing as outlined above:    Assessment:     1. Acute exacerbation of congestive heart failure: Decompensated, second admission in less than a month, will adjust diuretic therapy  2. Acute hypoxic respiratory failure: Secondary to above  3. Atrial fibrillation with RVR: Rate is better controlled on current treatment, will continue  4. Elevated troponin: Secondary to acute hypoxic ventilatory failure  5. CAD: Nonobstructive involving LAD and left circumflex artery  6. Tricuspid valve regurgitation: Mild  7. Pulmonary hypertension: Moderate  8. Hypertension: Controlled  9. Hyperlipidemia  10. Type 2 diabetes mellitus  11. History of beta-blocker and amiodarone induced bradycardia  12. History of digitoxicity  13. Acute kidney injury    Recommendations:     1. Will continue IV diuresis  2. Amiodarone 200 mg 1 by mouth once  3. Continue the rest of medications  4. Intake and output  5.   Basic metabolic panel and CBC in a.m.  6.  Further cardiac recommendations will be forthcoming pending her clinical course and diagnostic test findings    Discussed with patient now family at bedside    Electronically signed by Raghu Boucher MD on 11/8/2022 at 6:08 PM  NOTE: This report was transcribed using voice recognition software.  Every effort was made to ensure accuracy; however, inadvertent computerized transcription errors may be present

## 2022-11-08 NOTE — PROGRESS NOTES
Pulmonary/Critical Care Progress Note    We are following patient for resp failure with hypoxia and hypercapnea    ISSUES:    Decompensated acute on chronic respiratory failure  Recurrent right effusion   Prior right chest tube in July  CT scan confirms recurrent R >> L sided Pleural Effusion with associated lung compression   Fluid transudative in past c/w CHF  Continue to treat CHF with diuresis   No indication for thoracentesis    Acute on Chronic Hypercapneic Respiratory Failure   COPD and SERENA  BiPAP QHS   Was not using before  ABGs much improved  Encouraged complaince    Atrial fibrillation  Rate controlled      Counseled  All questions answered   ______________________________________________    SUBJECTIVE:  67year old with SERENA, obesity,COPD with chronic chf and atrial fib. Progressively worse coming to the hospital and with ABG deragements placed on bipapp. Improvement thereafter and transitions to nc to eat and drink. PMHx: Reviewed by chart, dm, htn, hypelipd, copd, CKD, as well parkinsons disease and CAD. Plus above.     MEDICATIONS:   amiodarone  200 mg Oral Daily    apixaban  5 mg Oral BID    doxycycline (VIBRAMYCIN) IV  100 mg IntraVENous Q12H    methylPREDNISolone  30 mg IntraVENous 3 times per day    atorvastatin  20 mg Oral Nightly    budesonide  0.5 mg Nebulization BID    carbidopa-levodopa  2 tablet Oral TID    divalproex  250 mg Oral BID    insulin glargine  13 Units SubCUTAneous BID    miconazole  1 each Topical BID    pantoprazole  40 mg Oral QAM    rOPINIRole  1 mg Oral TID    sucralfate  1 g Oral 4x Daily    sodium chloride flush  5-40 mL IntraVENous 2 times per day    furosemide  40 mg IntraVENous BID    ipratropium-albuterol  1 ampule Inhalation Q4H WA      dextrose      sodium chloride       glucose, dextrose bolus **OR** dextrose bolus, glucagon (rDNA), dextrose, acetaminophen, docusate sodium, sodium chloride flush, sodium chloride, ondansetron **OR** ondansetron, polyethylene glycol, acetaminophen **OR** acetaminophen    Review of Systems   Constitutional:  Positive for fatigue. HENT: Negative. Eyes: Negative. Respiratory:  Positive for cough and shortness of breath. Cardiovascular: Negative. Gastrointestinal: Negative. Endocrine: Negative. Genitourinary: Negative. Musculoskeletal: Negative. Skin: Negative. Allergic/Immunologic: Negative. Neurological:  Positive for weakness. Hematological: Negative. Psychiatric/Behavioral: Negative.        OBJECTIVE:  Vitals:    11/08/22 1530   BP: 122/63   Pulse: 94   Resp: 18   Temp: 97.5 °F (36.4 °C)   SpO2: 96%     FiO2 : 50 %  O2 Flow Rate (L/min): 3 L/min  O2 Device: Nasal cannula    PHYSICAL EXAM:  Constitutional: Alert and talkative obese woman  Skin: no breakdown and no ischemia  HEENT: Normocephalic and supple  Neck: No  jvd or bruits  Cardiovascular: Rr irreg and no murmer  Respiratory: deminished and few late wheezes  Gastrointestinal: soft and bowel sounds intact  Genitourinary: deferred  Extremities: no dvt of ischemia  Neurological: intact grossly talkative and interactive   Psychological: appropriate      LABS:  WBC   Date Value Ref Range Status   11/08/2022 4.5 4.5 - 11.5 E9/L Final   11/07/2022 7.6 4.5 - 11.5 E9/L Final   11/06/2022 7.1 4.5 - 11.5 E9/L Final     Hemoglobin   Date Value Ref Range Status   11/08/2022 8.5 (L) 11.5 - 15.5 g/dL Final   11/07/2022 9.3 (L) 11.5 - 15.5 g/dL Final   11/06/2022 9.5 (L) 11.5 - 15.5 g/dL Final     Hematocrit   Date Value Ref Range Status   11/08/2022 29.8 (L) 34.0 - 48.0 % Final   11/07/2022 34.2 34.0 - 48.0 % Final   11/06/2022 34.6 34.0 - 48.0 % Final     MCV   Date Value Ref Range Status   11/08/2022 92.3 80.0 - 99.9 fL Final   11/07/2022 93.2 80.0 - 99.9 fL Final   11/06/2022 95.3 80.0 - 99.9 fL Final     Platelets   Date Value Ref Range Status   11/08/2022 143 130 - 450 E9/L Final   11/07/2022 193 130 - 450 E9/L Final   11/06/2022 168 130 - 450 E9/L Final Sodium   Date Value Ref Range Status   11/08/2022 137 132 - 146 mmol/L Final   11/07/2022 139 132 - 146 mmol/L Final   11/06/2022 141 132 - 146 mmol/L Final     Potassium   Date Value Ref Range Status   11/08/2022 5.2 (H) 3.5 - 5.0 mmol/L Final   11/06/2022 5.1 (H) 3.5 - 5.0 mmol/L Final   10/16/2022 4.3 3.5 - 5.0 mmol/L Final     Potassium reflex Magnesium   Date Value Ref Range Status   11/07/2022 5.5 (H) 3.5 - 5.0 mmol/L Final   10/13/2022 4.2 3.5 - 5.0 mmol/L Final   10/12/2022 4.3 3.5 - 5.0 mmol/L Final     Chloride   Date Value Ref Range Status   11/08/2022 92 (L) 98 - 107 mmol/L Final   11/07/2022 96 (L) 98 - 107 mmol/L Final   11/06/2022 96 (L) 98 - 107 mmol/L Final     CO2   Date Value Ref Range Status   11/08/2022 34 (H) 22 - 29 mmol/L Final   11/07/2022 35 (H) 22 - 29 mmol/L Final   11/06/2022 36 (H) 22 - 29 mmol/L Final     BUN   Date Value Ref Range Status   11/08/2022 67 (H) 6 - 23 mg/dL Final   11/07/2022 65 (H) 6 - 23 mg/dL Final   11/06/2022 61 (H) 6 - 23 mg/dL Final     Creatinine   Date Value Ref Range Status   11/08/2022 1.9 (H) 0.5 - 1.0 mg/dL Final   11/07/2022 1.9 (H) 0.5 - 1.0 mg/dL Final   11/06/2022 1.9 (H) 0.5 - 1.0 mg/dL Final     Glucose   Date Value Ref Range Status   11/08/2022 252 (H) 74 - 99 mg/dL Final   11/07/2022 181 (H) 74 - 99 mg/dL Final   11/06/2022 213 (H) 74 - 99 mg/dL Final     Calcium   Date Value Ref Range Status   11/08/2022 8.5 (L) 8.6 - 10.2 mg/dL Final   11/07/2022 8.8 8.6 - 10.2 mg/dL Final   11/06/2022 8.9 8.6 - 10.2 mg/dL Final     Total Protein   Date Value Ref Range Status   11/07/2022 5.3 (L) 6.4 - 8.3 g/dL Final   10/14/2022 6.3 (L) 6.4 - 8.3 g/dL Final   10/13/2022 5.6 (L) 6.4 - 8.3 g/dL Final     Albumin   Date Value Ref Range Status   11/07/2022 3.3 (L) 3.5 - 5.2 g/dL Final   10/14/2022 3.5 3.5 - 5.2 g/dL Final   10/13/2022 3.1 (L) 3.5 - 5.2 g/dL Final     Total Bilirubin   Date Value Ref Range Status   11/07/2022 0.4 0.0 - 1.2 mg/dL Final   10/14/2022 0.3 0.0 - 1.2 mg/dL Final   10/13/2022 0.3 0.0 - 1.2 mg/dL Final     Alkaline Phosphatase   Date Value Ref Range Status   11/07/2022 74 35 - 104 U/L Final   10/14/2022 98 35 - 104 U/L Final   10/13/2022 87 35 - 104 U/L Final     AST   Date Value Ref Range Status   11/07/2022 7 0 - 31 U/L Final   10/14/2022 7 0 - 31 U/L Final   10/13/2022 12 0 - 31 U/L Final     Comment:     Specimen is slightly Hemolyzed. Result may be artificially increased. ALT   Date Value Ref Range Status   11/07/2022 <5 0 - 32 U/L Final   10/14/2022 <5 0 - 32 U/L Final   10/13/2022 <5 0 - 32 U/L Final     Est, Glom Filt Rate   Date Value Ref Range Status   11/08/2022 28 >=60 mL/min/1.73 Final     Comment:     Pediatric calculator link  [email or web address]_[email or web address]  Effective Oct 3, 2022  These results are not intended for use in patients  <25years of age. eGFR results are calculated without  a race factor using the 2021 CKD-EPI equation. Careful  clinical correlation is recommended, particularly when  comparing to results calculated using previous equations. The CKD-EPI equation is less accurate in patients with  extremes of muscle mass, extra-renal metabolism of  creatinine, excessive creatinine ingestion, or following  therapy that affects renal tubular secretion. 11/07/2022 28 >=60 mL/min/1.73 Final     Comment:     Pediatric calculator link  [email or web address]_[email or web address]  Effective Oct 3, 2022  These results are not intended for use in patients  <25years of age. eGFR results are calculated without  a race factor using the 2021 CKD-EPI equation. Careful  clinical correlation is recommended, particularly when  comparing to results calculated using previous equations.   The CKD-EPI equation is less accurate in patients with  extremes of muscle mass, extra-renal metabolism of  creatinine, excessive creatinine ingestion, or following  therapy that affects renal tubular secretion. 11/06/2022 28 >=60 mL/min/1.73 Final     Comment:     Pediatric calculator link  [email or web address]_[email or web address]  Effective Oct 3, 2022  These results are not intended for use in patients  <25years of age. eGFR results are calculated without  a race factor using the 2021 CKD-EPI equation. Careful  clinical correlation is recommended, particularly when  comparing to results calculated using previous equations. The CKD-EPI equation is less accurate in patients with  extremes of muscle mass, extra-renal metabolism of  creatinine, excessive creatinine ingestion, or following  therapy that affects renal tubular secretion. GFR    Date Value Ref Range Status   10/16/2022 >60  Final   10/14/2022 59  Final   10/13/2022 >60  Final     Magnesium   Date Value Ref Range Status   10/14/2022 2.1 1.6 - 2.6 mg/dL Final   10/08/2022 1.9 1.6 - 2.6 mg/dL Final   10/07/2022 1.8 1.6 - 2.6 mg/dL Final     Phosphorus   Date Value Ref Range Status   10/08/2022 3.0 2.5 - 4.5 mg/dL Final   10/07/2022 2.8 2.5 - 4.5 mg/dL Final   10/06/2022 2.6 2.5 - 4.5 mg/dL Final     Recent Labs     11/07/22  0549   PH 7.356   PO2 120.1*   PCO2 65.0*   HCO3 35.6*   BE 8.4*   O2SAT 97.9         RADIOLOGY:  CT CHEST WO CONTRAST   Final Result   Bilateral pleural effusions large on the right and small on the left with   collapse of the right middle and lower lobe. Four-chamber cardiac   enlargement with enlargement of the pulmonary arteries to suggest pulmonary   arterial hypertension and small pericardial effusion. Findings raise a   concern for possible failure. XR CHEST PORTABLE   Final Result   1. Small to moderate right pleural effusion   2. Right lung base atelectasis   3.  Cardiomegaly                 PROBLEM LIST:  Active Problems:    Acute decompensated heart failure ([address]    Acute kidney injury ([address]    Acute respiratory failure with hypoxia and hypercapnia (HCC)    Acute on chronic diastolic heart failure (HCC)    Acute on chronic respiratory failure (HCC)    Elevated troponin    Hyperkalemia    Depression with anxiety    Hyperlipidemia    Obstructive sleep apnea syndrome    Atrial fibrillation with RVR ([address]  Resolved Problems:    * No resolved hospital problems.  Nati Salinas MD  Pulmonary & Critical Care  11/8/2022 4:39 PM

## 2022-11-08 NOTE — CONSULTS
Comprehensive Nutrition Assessment    Type and Reason for Visit:  Initial, Consult, Patient Education    Nutrition Recommendations/Plan: Continue to monitor     Malnutrition Assessment:  Malnutrition Status:  No malnutrition (11/08/22 1452)    Context:  Chronic Illness     Findings of the 6 clinical characteristics of malnutrition:  Energy Intake:  No significant decrease in energy intake  Weight Loss:  Unable to assess (JOHN for weight per emr review d/t wt flux r/t fluid status)     Body Fat Loss:  No significant body fat loss     Muscle Mass Loss:  No significant muscle mass loss    Fluid Accumulation:  No significant fluid accumulation     Strength:  Not Performed    Nutrition Assessment:    Pt is LTC resident who admits w/ Dx: Excerbation COPD, +PNA atb in place w/ significant past medical history of CHF, A. Fib, HTN, Breast Ca, SERENA, Dementia, Dysphagia. Consult for Diet education completed, educational material provided, good understanding of diet. Nutrition Related Findings:    A/ox4, abd obese/soft, +BS, BLE +2 pitting edema, BUE +1 edema, Labs reviewed: K+(H), renal labs elevated, BGL(H), -I/O -2.1L Wound Type: None       Current Nutrition Intake & Therapies:    Average Meal Intake: %     ADULT DIET; Regular; Low Sodium (2 gm); Mildly Thick ([place])    Anthropometric Measures:  Height: 5' (152.4 cm)  Ideal Body Weight (IBW): 100 lbs (45 kg)    Admission Body Weight: 239 lb (108.4 kg) (11/6 bed scale)  Current Body Weight: 240 lb (108.9 kg) (11/8 bed scale), 240 % IBW. Weight Source: Bed Scale  Current BMI (kg/m2): 46.9  Usual Body Weight:  (per emr review measured weights: x1mo 216-220# x3mo 232-255#.  Large wt flux r/t fluid status)                       BMI Categories: Obese Class 3 (BMI 40.0 or greater)    Estimated Daily Nutrient Needs:  Energy Requirements Based On: Kcal/kg  Weight Used for Energy Requirements: Current  Energy (kcal/day): 4908-5376 (u57-42vthp/kg)  Weight Used for Protein Requirements: Ideal  Protein (g/day): 80-90 (x1.8-2.0gm/kg Monitor renal status)  Method Used for Fluid Requirements: 1 ml/kcal  Fluid (ml/day): 7134-2277    Nutrition Diagnosis:   No nutrition diagnosis at this time    Nutrition Interventions:   Food and/or Nutrient Delivery: Continue Current Diet  Nutrition Education/Counseling: Education completed  Coordination of Nutrition Care: Continue to monitor while inpatient       Goals:     Goals: PO intake 75% or greater       Nutrition Monitoring and Evaluation:      Food/Nutrient Intake Outcomes: Food and Nutrient Intake  Physical Signs/Symptoms Outcomes: Biochemical Data, Chewing or Swallowing, Fluid Status or Edema, Nausea or Vomiting, Nutrition Focused Physical Findings, Skin, Weight    Discharge Planning:    No discharge needs at this time, Continue current diet     Merlinda Ego, RD, LD  Contact: [phone]

## 2022-11-09 LAB
ANION GAP SERPL CALCULATED.3IONS-SCNC: 11 MMOL/L (ref 7–16)
BUN BLDV-MCNC: 69 MG/DL (ref 6–23)
CALCIUM SERPL-MCNC: 9.2 MG/DL (ref 8.6–10.2)
CHLORIDE BLD-SCNC: 91 MMOL/L (ref 98–107)
CO2: 36 MMOL/L (ref 22–29)
CREAT SERPL-MCNC: 1.8 MG/DL (ref 0.5–1)
GFR SERPL CREATININE-BSD FRML MDRD: 29 ML/MIN/1.73
GLUCOSE BLD-MCNC: 349 MG/DL (ref 74–99)
HCT VFR BLD CALC: 35.8 % (ref 34–48)
HEMOGLOBIN: 10.1 G/DL (ref 11.5–15.5)
MCH RBC QN AUTO: 26.1 PG (ref 26–35)
MCHC RBC AUTO-ENTMCNC: 28.2 % (ref 32–34.5)
MCV RBC AUTO: 92.5 FL (ref 80–99.9)
METER GLUCOSE: 280 MG/DL (ref 74–99)
METER GLUCOSE: 299 MG/DL (ref 74–99)
METER GLUCOSE: 325 MG/DL (ref 74–99)
METER GLUCOSE: 361 MG/DL (ref 74–99)
METER GLUCOSE: 391 MG/DL (ref 74–99)
METER GLUCOSE: 414 MG/DL (ref 74–99)
PDW BLD-RTO: 14.8 FL (ref 11.5–15)
PLATELET # BLD: 164 E9/L (ref 130–450)
PMV BLD AUTO: 10.6 FL (ref 7–12)
POTASSIUM REFLEX MAGNESIUM: 4.8 MMOL/L (ref 3.5–5)
RBC # BLD: 3.87 E12/L (ref 3.5–5.5)
SODIUM BLD-SCNC: 138 MMOL/L (ref 132–146)
WBC # BLD: 6.5 E9/L (ref 4.5–11.5)

## 2022-11-09 PROCEDURE — 99232 SBSQ HOSP IP/OBS MODERATE 35: CPT | Performed by: INTERNAL MEDICINE

## 2022-11-09 PROCEDURE — 82962 GLUCOSE BLOOD TEST: CPT

## 2022-11-09 PROCEDURE — 2580000003 HC RX 258: Performed by: INTERNAL MEDICINE

## 2022-11-09 PROCEDURE — 2700000000 HC OXYGEN THERAPY PER DAY

## 2022-11-09 PROCEDURE — 6370000000 HC RX 637 (ALT 250 FOR IP): Performed by: INTERNAL MEDICINE

## 2022-11-09 PROCEDURE — 94640 AIRWAY INHALATION TREATMENT: CPT

## 2022-11-09 PROCEDURE — 97110 THERAPEUTIC EXERCISES: CPT

## 2022-11-09 PROCEDURE — 2500000003 HC RX 250 WO HCPCS: Performed by: INTERNAL MEDICINE

## 2022-11-09 PROCEDURE — 6360000002 HC RX W HCPCS: Performed by: INTERNAL MEDICINE

## 2022-11-09 PROCEDURE — 85027 COMPLETE CBC AUTOMATED: CPT

## 2022-11-09 PROCEDURE — 36415 COLL VENOUS BLD VENIPUNCTURE: CPT

## 2022-11-09 PROCEDURE — 97530 THERAPEUTIC ACTIVITIES: CPT

## 2022-11-09 PROCEDURE — 94660 CPAP INITIATION&MGMT: CPT

## 2022-11-09 PROCEDURE — 6370000000 HC RX 637 (ALT 250 FOR IP)

## 2022-11-09 PROCEDURE — 2060000000 HC ICU INTERMEDIATE R&B

## 2022-11-09 PROCEDURE — 6370000000 HC RX 637 (ALT 250 FOR IP): Performed by: NURSE PRACTITIONER

## 2022-11-09 PROCEDURE — 80048 BASIC METABOLIC PNL TOTAL CA: CPT

## 2022-11-09 PROCEDURE — 99233 SBSQ HOSP IP/OBS HIGH 50: CPT | Performed by: INTERNAL MEDICINE

## 2022-11-09 RX ORDER — DIGOXIN 0.25 MG/ML
250 INJECTION INTRAMUSCULAR; INTRAVENOUS EVERY 4 HOURS
Status: COMPLETED | OUTPATIENT
Start: 2022-11-09 | End: 2022-11-09

## 2022-11-09 RX ORDER — LORAZEPAM 0.5 MG/1
0.5 TABLET ORAL NIGHTLY PRN
Status: DISCONTINUED | OUTPATIENT
Start: 2022-11-09 | End: 2022-11-12 | Stop reason: HOSPADM

## 2022-11-09 RX ORDER — INSULIN LISPRO 100 [IU]/ML
4 INJECTION, SOLUTION INTRAVENOUS; SUBCUTANEOUS ONCE
Status: COMPLETED | OUTPATIENT
Start: 2022-11-09 | End: 2022-11-09

## 2022-11-09 RX ADMIN — IPRATROPIUM BROMIDE AND ALBUTEROL SULFATE 1 AMPULE: .5; 2.5 SOLUTION RESPIRATORY (INHALATION) at 09:32

## 2022-11-09 RX ADMIN — INSULIN LISPRO 8 UNITS: 100 INJECTION, SOLUTION INTRAVENOUS; SUBCUTANEOUS at 16:44

## 2022-11-09 RX ADMIN — ANTI-FUNGAL POWDER MICONAZOLE NITRATE TALC FREE 1 EACH: 1.42 POWDER TOPICAL at 20:57

## 2022-11-09 RX ADMIN — BUDESONIDE 500 MCG: 0.5 SUSPENSION RESPIRATORY (INHALATION) at 18:44

## 2022-11-09 RX ADMIN — SUCRALFATE 1 G: 1 TABLET ORAL at 16:36

## 2022-11-09 RX ADMIN — ROPINIROLE 1 MG: 1 TABLET, FILM COATED ORAL at 20:57

## 2022-11-09 RX ADMIN — SODIUM CHLORIDE, PRESERVATIVE FREE 10 ML: 5 INJECTION INTRAVENOUS at 20:57

## 2022-11-09 RX ADMIN — DIGOXIN 250 MCG: 0.25 INJECTION INTRAMUSCULAR; INTRAVENOUS at 20:56

## 2022-11-09 RX ADMIN — METHYLPREDNISOLONE SODIUM SUCCINATE 30 MG: 40 INJECTION, POWDER, FOR SOLUTION INTRAMUSCULAR; INTRAVENOUS at 20:56

## 2022-11-09 RX ADMIN — DIGOXIN 250 MCG: 0.25 INJECTION INTRAMUSCULAR; INTRAVENOUS at 16:34

## 2022-11-09 RX ADMIN — DOXYCYCLINE 100 MG: 100 INJECTION, POWDER, LYOPHILIZED, FOR SOLUTION INTRAVENOUS at 16:39

## 2022-11-09 RX ADMIN — IPRATROPIUM BROMIDE AND ALBUTEROL SULFATE 1 AMPULE: .5; 2.5 SOLUTION RESPIRATORY (INHALATION) at 13:15

## 2022-11-09 RX ADMIN — DIVALPROEX SODIUM 250 MG: 250 TABLET, DELAYED RELEASE ORAL at 09:57

## 2022-11-09 RX ADMIN — PANTOPRAZOLE SODIUM 40 MG: 40 TABLET, DELAYED RELEASE ORAL at 09:58

## 2022-11-09 RX ADMIN — CARBIDOPA AND LEVODOPA 2 TABLET: 25; 100 TABLET, EXTENDED RELEASE ORAL at 09:56

## 2022-11-09 RX ADMIN — IPRATROPIUM BROMIDE AND ALBUTEROL SULFATE 1 AMPULE: .5; 2.5 SOLUTION RESPIRATORY (INHALATION) at 18:44

## 2022-11-09 RX ADMIN — ATORVASTATIN CALCIUM 20 MG: 20 TABLET, FILM COATED ORAL at 20:57

## 2022-11-09 RX ADMIN — ACETAMINOPHEN 650 MG: 325 TABLET ORAL at 02:27

## 2022-11-09 RX ADMIN — IPRATROPIUM BROMIDE AND ALBUTEROL SULFATE 1 AMPULE: .5; 2.5 SOLUTION RESPIRATORY (INHALATION) at 06:21

## 2022-11-09 RX ADMIN — ONDANSETRON 4 MG: 2 INJECTION INTRAMUSCULAR; INTRAVENOUS at 16:36

## 2022-11-09 RX ADMIN — CARBIDOPA AND LEVODOPA 2 TABLET: 25; 100 TABLET, EXTENDED RELEASE ORAL at 14:34

## 2022-11-09 RX ADMIN — INSULIN LISPRO 6 UNITS: 100 INJECTION, SOLUTION INTRAVENOUS; SUBCUTANEOUS at 12:00

## 2022-11-09 RX ADMIN — DIVALPROEX SODIUM 250 MG: 250 TABLET, DELAYED RELEASE ORAL at 20:57

## 2022-11-09 RX ADMIN — SODIUM CHLORIDE, PRESERVATIVE FREE 10 ML: 5 INJECTION INTRAVENOUS at 09:56

## 2022-11-09 RX ADMIN — INSULIN GLARGINE 13 UNITS: 100 INJECTION, SOLUTION SUBCUTANEOUS at 10:03

## 2022-11-09 RX ADMIN — ROPINIROLE 1 MG: 1 TABLET, FILM COATED ORAL at 09:57

## 2022-11-09 RX ADMIN — ROPINIROLE 1 MG: 1 TABLET, FILM COATED ORAL at 14:34

## 2022-11-09 RX ADMIN — CARBIDOPA AND LEVODOPA 2 TABLET: 25; 100 TABLET, EXTENDED RELEASE ORAL at 20:57

## 2022-11-09 RX ADMIN — INSULIN LISPRO 4 UNITS: 100 INJECTION, SOLUTION INTRAVENOUS; SUBCUTANEOUS at 10:03

## 2022-11-09 RX ADMIN — SUCRALFATE 1 G: 1 TABLET ORAL at 11:59

## 2022-11-09 RX ADMIN — APIXABAN 5 MG: 5 TABLET, FILM COATED ORAL at 09:57

## 2022-11-09 RX ADMIN — LORAZEPAM 0.5 MG: 0.5 TABLET ORAL at 23:01

## 2022-11-09 RX ADMIN — AMIODARONE HYDROCHLORIDE 200 MG: 200 TABLET ORAL at 09:57

## 2022-11-09 RX ADMIN — FUROSEMIDE 40 MG: 10 INJECTION, SOLUTION INTRAMUSCULAR; INTRAVENOUS at 09:54

## 2022-11-09 RX ADMIN — INSULIN LISPRO 4 UNITS: 100 INJECTION, SOLUTION INTRAVENOUS; SUBCUTANEOUS at 20:58

## 2022-11-09 RX ADMIN — INSULIN LISPRO 4 UNITS: 100 INJECTION, SOLUTION INTRAVENOUS; SUBCUTANEOUS at 02:39

## 2022-11-09 RX ADMIN — SUCRALFATE 1 G: 1 TABLET ORAL at 09:56

## 2022-11-09 RX ADMIN — INSULIN GLARGINE 13 UNITS: 100 INJECTION, SOLUTION SUBCUTANEOUS at 20:58

## 2022-11-09 RX ADMIN — METHYLPREDNISOLONE SODIUM SUCCINATE 30 MG: 40 INJECTION, POWDER, FOR SOLUTION INTRAMUSCULAR; INTRAVENOUS at 14:26

## 2022-11-09 RX ADMIN — METHYLPREDNISOLONE SODIUM SUCCINATE 30 MG: 40 INJECTION, POWDER, FOR SOLUTION INTRAMUSCULAR; INTRAVENOUS at 06:03

## 2022-11-09 RX ADMIN — DOXYCYCLINE 100 MG: 100 INJECTION, POWDER, LYOPHILIZED, FOR SOLUTION INTRAVENOUS at 03:41

## 2022-11-09 RX ADMIN — FUROSEMIDE 40 MG: 10 INJECTION, SOLUTION INTRAMUSCULAR; INTRAVENOUS at 20:56

## 2022-11-09 RX ADMIN — BUDESONIDE 500 MCG: 0.5 SUSPENSION RESPIRATORY (INHALATION) at 06:21

## 2022-11-09 RX ADMIN — APIXABAN 5 MG: 5 TABLET, FILM COATED ORAL at 20:57

## 2022-11-09 RX ADMIN — SUCRALFATE 1 G: 1 TABLET ORAL at 20:57

## 2022-11-09 ASSESSMENT — ENCOUNTER SYMPTOMS
ALLERGIC/IMMUNOLOGIC NEGATIVE: 1
COUGH: 1
GASTROINTESTINAL NEGATIVE: 1
EYES NEGATIVE: 1
SHORTNESS OF BREATH: 1

## 2022-11-09 ASSESSMENT — PAIN DESCRIPTION - DESCRIPTORS: DESCRIPTORS: ACHING

## 2022-11-09 ASSESSMENT — PAIN SCALES - GENERAL
PAINLEVEL_OUTOF10: 0
PAINLEVEL_OUTOF10: 7

## 2022-11-09 ASSESSMENT — PAIN SCALES - WONG BAKER: WONGBAKER_NUMERICALRESPONSE: 0

## 2022-11-09 ASSESSMENT — PAIN DESCRIPTION - LOCATION: LOCATION: HEAD

## 2022-11-09 NOTE — PROGRESS NOTES
Patient was found to have an RR high glucose. Dr. Mary Romero ordered a sliding scale. 4 units of lispro were given with her evening lantus. Her glucose was still in the 400s. An additional dose of 4 units of lispro was given. Her glucose was checked thirty minutes after and it was still 425. Dr. Mary Romero was notified and her glucose will be checked in two hours. Will continue to monitor the patient.

## 2022-11-09 NOTE — PROGRESS NOTES
Admitting Date and Time: 11/6/2022 10:01 AM  Admit Dx: COPD exacerbation ([facility] [J44.1]  Acute on chronic respiratory failure (HCC) [J96.20]  Acute on chronic respiratory failure with hypercapnia (HCC) [J96.22]  Hypercapnic respiratory failure (HCC) [J96.92]    Subjective:    Admits to claustrophobia for mri and bipap      ROS: denies fever, chills, cp, sob, n/v, HA unless stated above.      digoxin  250 mcg IntraVENous Q4H    insulin lispro  0-4 Units SubCUTAneous Nightly    insulin lispro  0-8 Units SubCUTAneous TID WC    amiodarone  200 mg Oral Daily    apixaban  5 mg Oral BID    doxycycline (VIBRAMYCIN) IV  100 mg IntraVENous Q12H    methylPREDNISolone  30 mg IntraVENous 3 times per day    atorvastatin  20 mg Oral Nightly    budesonide  0.5 mg Nebulization BID    carbidopa-levodopa  2 tablet Oral TID    divalproex  250 mg Oral BID    insulin glargine  13 Units SubCUTAneous BID    miconazole  1 each Topical BID    pantoprazole  40 mg Oral QAM    rOPINIRole  1 mg Oral TID    sucralfate  1 g Oral 4x Daily    sodium chloride flush  5-40 mL IntraVENous 2 times per day    furosemide  40 mg IntraVENous BID    ipratropium-albuterol  1 ampule Inhalation Q4H WA     glucose, 4 tablet, PRN  dextrose bolus, 125 mL, PRN   Or  dextrose bolus, 250 mL, PRN  glucagon (rDNA), 1 mg, PRN  dextrose, , Continuous PRN  acetaminophen, 650 mg, Q4H PRN  docusate sodium, 100 mg, BID PRN  sodium chloride flush, 5-40 mL, PRN  sodium chloride, , PRN  ondansetron, 4 mg, Q8H PRN   Or  ondansetron, 4 mg, Q6H PRN  polyethylene glycol, 17 g, Daily PRN  acetaminophen, 650 mg, Q6H PRN   Or  acetaminophen, 650 mg, Q6H PRN       Objective:    BP (!) 117/56   Pulse 99   Temp 97.8 °F (36.6 °C)   Resp 20   Ht 5' (1.524 m)   Wt 235 lb (106.6 kg)   SpO2 97%   BMI 45.90 kg/m²   General Appearance: alert and oriented to person, place and time and in no acute distress  Skin: warm and dry  Head: normocephalic and atraumatic  Eyes: pupils equal, round, and reactive to light, extraocular eye movements intact, conjunctivae normal  Neck: neck supple and non tender without mass   Pulmonary/Chest: clear to auscultation bilaterally- no wheezes, rales or rhonchi, normal air movement, no respiratory distress  Cardiovascular: normal rate, normal S1 and S2 and no carotid bruits  Abdomen: soft, non-tender, non-distended, normal bowel sounds, no masses or organomegaly  Extremities: no cyanosis, no clubbing and   Mild / trace edema  Neurologic: no cranial nerve deficit and speech normal      Recent Labs     11/07/22  1441 11/08/22  0546 11/09/22  1033    137 138   K 5.5* 5.2* 4.8   CL 96* 92* 91*   CO2 35* 34* 36*   BUN 65* 67* 69*   CREATININE 1.9* 1.9* 1.8*   GLUCOSE 181* 252* 349*   CALCIUM 8.8 8.5* 9.2         Recent Labs     11/07/22  1441   ALKPHOS 74   PROT 5.3*   LABALBU 3.3*   BILITOT 0.4   AST 7   ALT <5         Recent Labs     11/07/22  1441 11/08/22  0546 11/09/22  1033   WBC 7.6 4.5 6.5   RBC 3.67 3.23* 3.87   HGB 9.3* 8.5* 10.1*   HCT 34.2 29.8* 35.8   MCV 93.2 92.3 92.5   MCH 25.3* 26.3 26.1   MCHC 27.2* 28.5* 28.2*   RDW 15.4* 15.2* 14.8    143 164   MPV 10.6 10.1 10.6             Radiology:   CT CHEST WO CONTRAST   Final Result   Bilateral pleural effusions large on the right and small on the left with   collapse of the right middle and lower lobe. Four-chamber cardiac   enlargement with enlargement of the pulmonary arteries to suggest pulmonary   arterial hypertension and small pericardial effusion. Findings raise a   concern for possible failure. XR CHEST PORTABLE   Final Result   1. Small to moderate right pleural effusion   2. Right lung base atelectasis   3.  Cardiomegaly             Assessment:  Active Problems:    Acute decompensated heart failure (HCC)    Acute kidney injury ([address]    Acute respiratory failure with hypoxia and hypercapnia (HCC)    Acute on chronic diastolic heart failure (HCC)    Acute on chronic respiratory failure ([facility]    Elevated troponin    Hyperkalemia    Depression with anxiety    Hyperlipidemia    Obstructive sleep apnea syndrome    Atrial fibrillation with RVR ([facility] [address] [facility]  Resolved Problems:    * No resolved hospital problems. *      Plan: History of present illness from History and Physical:  The patient is a 67 y.o. female with significant past medical history of CHF, A. Fib, Hypertension Essential, Hx of Breast Ca who presents with weakness and lower extremity swelling. She noted that for the last 2 days she is feeling more tired this has progressively gotten worse started today. She was admitted to increased lower extremity edema. She denied fever chills no nausea no vomiting no cough. In the ER she was found to be lethargic. ABG was suggestive of hypercapnia. Her pH was 7.24, PCO2 was 80. She was placed on BiPAP and admitted for further evaluation. In the ER she was also noted to have irregular heartbeat with heart rate persistently above 100 with high of 130s and low of 104. Blood pressure was also low. She is being admitted for further management. EKG in the ER shows A. fib with RVR. Due to fluid overload and uncontrolled atrial fibrillation she was given loading dose of digoxin and additional 2 doses of every 6 hours after the initial dose. Decompensated acute on chronic respiratory failure: Volume overload. Input by pulmonary appreciated who has her on doxy now  Acute decompensated diastolic heart failure. Initially with hypotension therefore diuretics was not given it initially. Cardiology notes: econd admission in less than a month. They will adjust diuretic therapy  A. fib rapid ventricular response was on amiodarone at home initially held here. Cardio adjusted digoxin . Cardiology adjusting anti arrhythmics. H/o beta-blocker and amiodarone induced bradycardia. History of digitoxicity  Obstructive sleep apnea BiPAP ordered & recently compliant.   However I suspect that her phobia has led to non complaince, not the nursing home personal who she has blamed  obesity needs lifestyle modification follow-up as an outpatient  copD continue current care not in exacerbation. There is also moderate pulmonary htn  Type II diabetes sliding scale diabetic diet current insulin regimen  Hyperlipidemia on statin  Skin fold rash nystatin powder  Insomnia Ambien initially on hold due to hypercapnia and I will continue to hold it due to the following:  Dementia possibly due to her known Parkinsons continue Sinemet  DVT prophylaxis: Eliquis  Full code. Disposition: Community skilled when ready possibly in 1-2 days    NOTE: This report was transcribed using voice recognition software. Every effort was made to ensure accuracy; however, inadvertent computerized transcription errors may be present.      Electronically signed by Sahara Perdue MD on 11/9/2022 at 4:37 PM

## 2022-11-09 NOTE — PROGRESS NOTES
Patient is seen in follow-up for CHF. Subjective:     Ms. Saint Host feels better today  Sitting up in bed no apparent distress    ROS:  CONSTITUTIONAL:  negative for  fevers, chills  HEENT:  negative for earaches, nasal congestion and epistaxis  RESPIRATORY:  negative for  dry cough, cough with sputum,wheezing and hemoptysis  GASTROINTESTINAL:  negative for nausea, vomiting  MUSCULOSKELETAL:  negative for  myalgias, arthralgias  NEUROLOGICAL:  negative for visual disturbance, dysphagia    Medication side effects: none    Scheduled Meds:   insulin lispro  0-4 Units SubCUTAneous Nightly    insulin lispro  0-8 Units SubCUTAneous TID     amiodarone  200 mg Oral Daily    apixaban  5 mg Oral BID    doxycycline (VIBRAMYCIN) IV  100 mg IntraVENous Q12H    methylPREDNISolone  30 mg IntraVENous 3 times per day    atorvastatin  20 mg Oral Nightly    budesonide  0.5 mg Nebulization BID    carbidopa-levodopa  2 tablet Oral TID    divalproex  250 mg Oral BID    insulin glargine  13 Units SubCUTAneous BID    miconazole  1 each Topical BID    pantoprazole  40 mg Oral QAM    rOPINIRole  1 mg Oral TID    sucralfate  1 g Oral 4x Daily    sodium chloride flush  5-40 mL IntraVENous 2 times per day    furosemide  40 mg IntraVENous BID    ipratropium-albuterol  1 ampule Inhalation Q4H WA     Continuous Infusions:   dextrose      sodium chloride       PRN Meds:glucose, dextrose bolus **OR** dextrose bolus, glucagon (rDNA), dextrose, acetaminophen, docusate sodium, sodium chloride flush, sodium chloride, ondansetron **OR** ondansetron, polyethylene glycol, acetaminophen **OR** acetaminophen    I/O last 3 completed shifts:   In: 360 [P.O.:360]  Out: 3600 [Urine:3600]  I/O this shift:  In: 120 [P.O.:120]  Out: -       Objective:      Physical Exam:   BP (!) 117/56   Pulse 99   Temp 97.8 °F (36.6 °C)   Resp 20   Ht 5' (1.524 m)   Wt 235 lb (106.6 kg)   SpO2 97%   BMI 45.90 kg/m²   CONSTITUTIONAL:  awake, alert, cooperative, no apparent distress, and appears stated age  HEAD:  normocepalic, without obvious abnormality, atraumatic  NECK:  Supple, symmetrical, trachea midline, no adenopathy, thyroid symmetric, not enlarged and no tenderness, skin normal  LUNGS:  No increased work of breathing, No accessory muscle use or intercostal retractions, decreased air exchange, bilateral rales  CARDIOVASCULAR:  Normal apical impulse, irregularly irregular, normal S1 and S2, no S3, 3/6 systolic murmur at the left lower sternal border,, no edema, no JVD, no carotid bruit. ABDOMEN:  Soft, nontender, no masses, no hepatomegaly, no splenomegaly, BS+  MUSCULOSKELETAL:  No clubbing no cyanosis. there is no redness, warmth, or swelling of the joints  full range of motion noted  NEUROLOGIC:  Alert, awake,oriented x3  SKIN:  no bruising or bleeding, normal skin color, texture, turgor and no redness, warmth, or swelling      Cardiographics  I personally reviewed the telemetry monitor strips with the following interpretation: Atrial fibrillation with controlled ventricular response    Echocardiogram: 7/7/2022,Summary   Technically difficult study - limited visualization. Micro-bubble contrast injected to enhance left ventricular visualization. Normal left ventricular size and systolic function. Ejection fraction is visually estimated at 70-75%. Indeterminate diastolic function. No regional wall motion abnormalities seen. Mild left ventricular concentric hypertrophy noted. Moderately dilated right ventricle with reduced function. Biatrial dilation. Mild tricuspid regurgitation. RVSP is at least 60 mmHg. Prominent pericardial fat pad. No definitive evidence of pericardial effusion. Imaging  CT CHEST WO CONTRAST   Final Result   Bilateral pleural effusions large on the right and small on the left with   collapse of the right middle and lower lobe.   Four-chamber cardiac   enlargement with enlargement of the pulmonary arteries to suggest pulmonary   arterial hypertension and small pericardial effusion. Findings raise a   concern for possible failure. XR CHEST PORTABLE   Final Result   1. Small to moderate right pleural effusion   2. Right lung base atelectasis   3. Cardiomegaly             Lab Review   Lab Results   Component Value Date/Time     11/09/2022 10:33 AM    K 4.8 11/09/2022 10:33 AM    CL 91 11/09/2022 10:33 AM    CO2 36 11/09/2022 10:33 AM    BUN 69 11/09/2022 10:33 AM    CREATININE 1.8 11/09/2022 10:33 AM    GLUCOSE 349 11/09/2022 10:33 AM    CALCIUM 9.2 11/09/2022 10:33 AM     Lab Results   Component Value Date/Time    WBC 6.5 11/09/2022 10:33 AM    HGB 10.1 11/09/2022 10:33 AM    HCT 35.8 11/09/2022 10:33 AM    MCV 92.5 11/09/2022 10:33 AM     11/09/2022 10:33 AM     I have personally reviewed the laboratory, cardiac diagnostic and radiographic testing as outlined above:    Assessment:     1. Acute exacerbation of congestive heart failure: Decompensated, second admission in less than a month, will adjust diuretic therapy  2. Acute hypoxic respiratory failure: Secondary to above  3. Atrial fibrillation with RVR: We will continue current treatment, will give 2 doses of digoxin  4. Elevated troponin: Secondary to acute hypoxic ventilatory failure  5. CAD: Nonobstructive involving LAD and left circumflex artery  6. Tricuspid valve regurgitation: Mild  7. Pulmonary hypertension: Moderate  8. Hypertension: Controlled  9. Hyperlipidemia  10. Type 2 diabetes mellitus  11. History of beta-blocker and amiodarone induced bradycardia  12. History of digitoxicity  13. Acute kidney injury: Stable    Recommendations:     1. Will continue IV diuresis  2. Digoxin 0.25 mg IV every 4 hours for 2 doses  3. Continue the rest of medications  4. Intake and output  5.   Basic metabolic panel and CBC in a.m.  6.  Increase ambulation as tolerated    Discussed with patient now family at bedside    Electronically signed by Gi Covington MD on 11/9/2022 at 3:22 PM  NOTE: This report was transcribed using voice recognition software.  Every effort was made to ensure accuracy; however, inadvertent computerized transcription errors may be present

## 2022-11-09 NOTE — PROGRESS NOTES
Physical Therapy    Physical Therapy Treatment Note/Plan of Care    Room #:  6819/9981-29  Patient Name: Dena Severin  YOB: 1950  MRN: [id number]    Date of Service: 11/9/2022     Tentative placement recommendation: [facility] with Physical Therapy   Equipment recommendation: Equipment at [address] Physical Therapist: Abner Jones, [address] [facility]  #[id number]      Specific Provider Orders/Date/Referring Provider :  11/07/22 1445    PT eval and treat  Start:  11/07/22 1445,   End:  11/07/22 1445,   ONE TIME,   Standing Count:  1 Occurrences,   R         Pennelope Dec, DO     Admitting Diagnosis:   COPD exacerbation ([address] [J44.1]  Acute on chronic respiratory failure ([address] [J96.20]  Acute on chronic respiratory failure with hypercapnia ([address] [J96.22]  Hypercapnic respiratory failure ([address] [J96.92]    Admitted with    shortness of breath bilateral lower extremities swelling  Surgery: none      Patient Active Problem List   Diagnosis    Depression with anxiety    Osteoporosis    Asthma    Hyperlipidemia    Mitral regurgitation    Obstructive sleep apnea syndrome    Psoriasis    Diabetes mellitus ([address]    Parkinson's disease ([address]    Primary hypertension    Microalbuminuria    Morbid obesity ([address]    RLS (restless legs syndrome)    Generalized weakness    Inability to walk    Hypothyroidism    Chest pain    Acute asthma exacerbation    Asthma exacerbation, mild    Paroxysmal atrial fibrillation (HCC)    Atrial fibrillation with RVR ([address]    Acute on chronic congestive heart failure ([address]    Coronary artery disease involving native coronary artery of native heart without angina pectoris    Dysphagia    Hepatosplenomegaly    Acute decompensated heart failure (HCC)    Acute diastolic (congestive) heart failure ([address]    Nonrheumatic tricuspid valve regurgitation    Tobacco abuse    Recurrent syncope    Septic shock ([address]    Seizure-like activity ([address]    Acute kidney injury ([address] Pancytopenia (HCC)    Thrombocytopenia (HCC)    Encephalopathy    Acute respiratory failure with hypoxia (HCC)    Lactic acidosis    Delirium    Acute on chronic anemia    Pleural effusion, right    Acute respiratory failure with hypoxia and hypercapnia (HCC)    Acute confusion    Acute on chronic diastolic heart failure (HCC)    Macrocytosis    Other specified anemias    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[facility]    Puerperal sepsis with acute hypoxic respiratory failure without septic shock (HCC)    Pulmonary HTN (HCC)    Chronic anticoagulation    RVF (right ventricular failure) (HCC)    Metabolic alkalosis    Sepsis (HCC)    COPD exacerbation (HCC)    Acute on chronic respiratory failure with hypercapnia (HCC)    Persistent atrial fibrillation (HCC)    Hypercapnic respiratory failure (HCC)    Acute on chronic respiratory failure (HCC)    Elevated troponin    Hyperkalemia        ASSESSMENT of Current Deficits Patient exhibits decreased strength, balance, and endurance impairing functional mobility, transfers, gait , gait distance, and tolerance to activity are barriers to d/c and require skilled intervention during hospital stay to impact mobility and tolerance to standing. Decreased strength, balance and endurance  increases patient's risk for fall. Patient needing minimal assist for sit to stand transfers and gait training. Pt's distance limited by impaired strength and endurance; needing a standing and seated rest period after each walk.       PHYSICAL THERAPY  PLAN OF CARE       Physical therapy plan of care is established based on physician order,  patient diagnosis and clinical assessment    Current Treatment Recommendations:    -Bed Mobility: Lower extremity exercises , Upper extremity exercises , and Trunk control activities   -Standing Balance: Perform strengthening exercises in standing to promote motor control with or without upper extremity support   -Transfers: Provide instruction on proper hand and foot position for adequate transfer of weight onto lower extremities and use of gait device if needed and Cues for hand placement, technique and safety. Provide stabilization to prevent fall   -Gait: Gait training and Standing activities to improve: base of support, weight shift, weight bearing    -Endurance: Utilize Supervised activities to increase level of endurance to allow for safe functional mobility including transfers and gait   -Instruction in independent management of O2 line    PT long term treatment goals are located in below grid    Patient and or family understand(s) diagnosis, prognosis, and plan of care. Frequency of treatments: Patient will be seen  daily.          Prior Level of Function: Patient ambulated with wheeled walker short distance, wheelchair user  Rehab Potential: good    for baseline    Past medical history:   Past Medical History:   Diagnosis Date    A-fib ([address]     Acute on chronic congestive heart failure ([address]     Anxiety     Asthma     CAD (coronary artery disease) 01/21/2016    Cancer ([address]  breast ca 2006    right lumpectomy    Chronic kidney disease     nephrolithiasis    COPD exacerbation ([address] 10/12/2022    Depression     Diabetes mellitus ([address]     H/O mammogram     Hx MRSA infection     toe infection january 2012    Hyperlipidemia     Hypertension     Lateral epicondylitis     Morbid obesity ([address]     SERENA on CPAP     Oxygen dependent     Parkinson's disease ([address]     Tubal ligation status      Past Surgical History:   Procedure Laterality Date    BREAST LUMPECTOMY      BREAST REDUCTION SURGERY      CARDIAC CATHETERIZATION  4/28/2014    Dr. Farhat Aguilar  01/11/2022    Dr. Claudette Felling  7/29/15    CT GUIDED CHEST TUBE  7/8/2022    CT GUIDED CHEST TUBE 7/8/2022 Nicole Lewis MD [facility]    ENDOSCOPY, COLON, DIAGNOSTIC  7/19/15    GALLBLADDER SURGERY      LUMBAR LAMINECTOMY      TOE AMPUTATION      TONSILLECTOMY      UPPER GASTROINTESTINAL ENDOSCOPY      UPPER GASTROINTESTINAL ENDOSCOPY N/A 7/19/2022    EGD ESOPHAGOGASTRODUODENOSCOPY performed by Riki Mcghee MD at [address]:    Precautions: Up as tolerated and Continuous Pulse Oximetry , falls, alarm, and O2 ,  2 Liters of o2 via nasal cannula     Social history: Patient lives in a skilled nursing facility      Equipment owned: Wheelchair, [address] bed,   cane, rollator    AM-PAC Basic Mobility        AM-PAC Mobility Inpatient   How much difficulty turning over in bed?: A Lot  How much difficulty sitting down on / standing up from a chair with arms?: A Little  How much difficulty moving from lying on back to sitting on side of bed?: A Lot  How much help from another person moving to and from a bed to a chair?: A Little  How much help from another person needed to walk in hospital room?: A Little  How much help from another person for climbing 3-5 steps with a railing?: Total  AM-PAC Inpatient Mobility Raw Score : 14  AM-PAC Inpatient T-Scale Score : 38.1  Mobility Inpatient CMS 0-100% Score: 61.29  Mobility Inpatient CMS G-Code Modifier : CL    Nursing cleared patient for PT treatment. OBJECTIVE;   Initial Evaluation  Date: 11/8/2022 Treatment Date:  11/9/2022       Short Term/ Long Term   Goals   Was pt agreeable to Eval/treatment? Yes yes To be met in 3 days   Pain level   0/10    0/10    Bed Mobility    Rolling: Moderate assist of 1    Supine to sit: Moderate assist of 1    Sit to supine: Not assessed patient in chair    Scooting: Minimal assist of 1   Rolling: Not assessed patient in chair   Supine to sit: Not assessed patient in chair   Sit to supine: Not assessed patient in chair   Scooting: Not assessed patient in chair    Rolling: Minimal assist of 1    Supine to sit: Minimal assist of 1    Sit to supine: Minimal assist of 1    Scooting: Minimal assist of 1     Transfers Sit to stand:  Moderate assist of 1 x 3 reps  Sit to stand: Minimal assist of 1 Cues for hand placement and safety     Sit to stand: Supervision      Ambulation     3x5 forward/backward steps using  wheeled walker with Minimal assist of 1   cues for upright posture, walker approximation, safety, proper hand placement, and pacing 2 x 8 feet; 2 x 12 feet using  wheeled walker with Minimal assist of 1   cues for upright posture, increased base of support, increased step length, safety, and pacing     15 feet using  wheeled walker with Minimal assist of 1    ROM Within functional limits    Increase range of motion 10% of affected joints    Strength BUE:   3/5  RLE:  3/5  LLE:  3/5  Increase strength in affected mm groups by 1/3 grade   Balance Sitting EOB:  fair    Dynamic Standing:  fair with wheeled walker  Sitting EOB/chair: fair +  Dynamic Standing: fair wheeled walker   Sitting EOB:  good    Dynamic Standing: good with wheeled walker      Patient is Alert & Oriented x person, place, time, and situation and follows directions    Sensation:  Patient  reports numbness/tingling bilateral lower extremities     Edema:  yes bilateral lower extremities   Endurance: poor      Vitals:  2 liters nasal cannula   Blood Pressure at rest  Blood Pressure during session    Heart Rate at rest   Heart Rate during session     SPO2 at rest 96%  SPO2 during session 96-97%      Patient education  Patient educated on role of Physical Therapy, risks of immobility, safety and plan of care and  importance of mobility while in hospital      Patient response to education:   Pt verbalized understanding Pt demonstrated skill Pt requires further education in this area   Yes Partial Yes      Treatment:  Patient practiced and was instructed/facilitated in the following treatment: Patient    sitting in a chair at start of tx session. Pt educated on pursed lip breathing, pacing, and safety.  Pt stood, ambulated in the room, and back to the chair but she did not sit, she stood for 3 - 4 minutes, ambulated in the room again, and back to the chair to rest. Pt performed seated exercises. Therapeutic Exercises:  ankle pumps, hip abduction/adduction, long arc quad, and seated marching, x 15 - 20 reps. AROM        At end of session, patient in chair with  call light and phone within reach,  all lines and tubes intact, nursing notified. Patient would benefit from continued skilled Physical Therapy to improve functional independence and quality of life. Patient's/ family goals   get stronger    Time in 09:55  Time out 10:20    Total Treatment Time  25 minutes        CPT codes:    Therapeutic activities (22518)   15 minutes  1 unit(s)  Therapeutic exercises (87995)   10 minutes  1 unit(s)    Maynor Manjarrez  [facility]  LIC # [id number]

## 2022-11-09 NOTE — PROGRESS NOTES
Pulmonary/Critical Care Progress Note    We are following patient for resp failure with hypoxia and hypercapnea    ISSUES:    Decompensated acute on chronic respiratory failure  Recurrent right effusion   Prior right chest tube in July  CT scan confirms recurrent R >> L sided Pleural Effusion with associated lung compression   Fluid transudative in past c/w CHF  Continue to treat CHF with diuresis   No indication for thoracentesis    Acute on Chronic Hypercapneic Respiratory Failure   COPD and SERENA  BiPAP QHS   Was not using before  ABGs much improved  Encouraged complaince    Atrial fibrillation  Rate controlled  Being followed by cardiology    11/9/22 Overall feeling better  Encouraged use of BiPAP  Diuresing    Continue present management     Counseled  All questions answered   ______________________________________________    SUBJECTIVE:  67year old with SERENA, obesity,COPD with chronic chf and atrial fib. Progressively worse coming to the hospital and with ABG deragements placed on bipapp. Improvement thereafter and transitions to nc to eat and drink. PMHx: Reviewed by chart, dm, htn, hypelipd, copd, CKD, as well parkinsons disease and CAD. Plus above.     MEDICATIONS:   digoxin  250 mcg IntraVENous Q4H    insulin lispro  0-4 Units SubCUTAneous Nightly    insulin lispro  0-8 Units SubCUTAneous TID WC    amiodarone  200 mg Oral Daily    apixaban  5 mg Oral BID    doxycycline (VIBRAMYCIN) IV  100 mg IntraVENous Q12H    methylPREDNISolone  30 mg IntraVENous 3 times per day    atorvastatin  20 mg Oral Nightly    budesonide  0.5 mg Nebulization BID    carbidopa-levodopa  2 tablet Oral TID    divalproex  250 mg Oral BID    insulin glargine  13 Units SubCUTAneous BID    miconazole  1 each Topical BID    pantoprazole  40 mg Oral QAM    rOPINIRole  1 mg Oral TID    sucralfate  1 g Oral 4x Daily    sodium chloride flush  5-40 mL IntraVENous 2 times per day    furosemide  40 mg IntraVENous BID    ipratropium-albuterol  1 ampule Inhalation Q4H WA      dextrose      sodium chloride       glucose, dextrose bolus **OR** dextrose bolus, glucagon (rDNA), dextrose, acetaminophen, docusate sodium, sodium chloride flush, sodium chloride, ondansetron **OR** ondansetron, polyethylene glycol, acetaminophen **OR** acetaminophen    Review of Systems   Constitutional:  Positive for fatigue. HENT: Negative. Eyes: Negative. Respiratory:  Positive for cough and shortness of breath. Cardiovascular: Negative. Gastrointestinal: Negative. Endocrine: Negative. Genitourinary: Negative. Musculoskeletal: Negative. Skin: Negative. Allergic/Immunologic: Negative. Neurological:  Positive for weakness. Hematological: Negative. Psychiatric/Behavioral: Negative.        OBJECTIVE:  Vitals:    11/09/22 0730   BP: (!) 117/56   Pulse: 99   Resp: 20   Temp: 97.8 °F (36.6 °C)   SpO2: 97%     FiO2 : 50 %  O2 Flow Rate (L/min): 2 L/min  O2 Device: Nasal cannula    PHYSICAL EXAM:  Constitutional: Alert and talkative obese woman  Skin: no breakdown and no ischemia  HEENT: Normocephalic and supple  Neck: No  jvd or bruits  Cardiovascular: Rr irreg and no murmer  Respiratory: deminished and few late wheezes  Gastrointestinal: soft and bowel sounds intact  Genitourinary: deferred  Extremities: no dvt of ischemia  Neurological: intact grossly talkative and interactive   Psychological: appropriate      LABS:  WBC   Date Value Ref Range Status   11/09/2022 6.5 4.5 - 11.5 E9/L Final   11/08/2022 4.5 4.5 - 11.5 E9/L Final   11/07/2022 7.6 4.5 - 11.5 E9/L Final     Hemoglobin   Date Value Ref Range Status   11/09/2022 10.1 (L) 11.5 - 15.5 g/dL Final   11/08/2022 8.5 (L) 11.5 - 15.5 g/dL Final   11/07/2022 9.3 (L) 11.5 - 15.5 g/dL Final     Hematocrit   Date Value Ref Range Status   11/09/2022 35.8 34.0 - 48.0 % Final   11/08/2022 29.8 (L) 34.0 - 48.0 % Final   11/07/2022 34.2 34.0 - 48.0 % Final     MCV   Date Value Ref Range Status   11/09/2022 92.5 80.0 - 99.9 fL Final   11/08/2022 92.3 80.0 - 99.9 fL Final   11/07/2022 93.2 80.0 - 99.9 fL Final     Platelets   Date Value Ref Range Status   11/09/2022 164 130 - 450 E9/L Final   11/08/2022 143 130 - 450 E9/L Final   11/07/2022 193 130 - 450 E9/L Final     Sodium   Date Value Ref Range Status   11/09/2022 138 132 - 146 mmol/L Final   11/08/2022 137 132 - 146 mmol/L Final   11/07/2022 139 132 - 146 mmol/L Final     Potassium   Date Value Ref Range Status   11/08/2022 5.2 (H) 3.5 - 5.0 mmol/L Final   11/06/2022 5.1 (H) 3.5 - 5.0 mmol/L Final   10/16/2022 4.3 3.5 - 5.0 mmol/L Final     Potassium reflex Magnesium   Date Value Ref Range Status   11/09/2022 4.8 3.5 - 5.0 mmol/L Final   11/07/2022 5.5 (H) 3.5 - 5.0 mmol/L Final   10/13/2022 4.2 3.5 - 5.0 mmol/L Final     Chloride   Date Value Ref Range Status   11/09/2022 91 (L) 98 - 107 mmol/L Final   11/08/2022 92 (L) 98 - 107 mmol/L Final   11/07/2022 96 (L) 98 - 107 mmol/L Final     CO2   Date Value Ref Range Status   11/09/2022 36 (H) 22 - 29 mmol/L Final   11/08/2022 34 (H) 22 - 29 mmol/L Final   11/07/2022 35 (H) 22 - 29 mmol/L Final     BUN   Date Value Ref Range Status   11/09/2022 69 (H) 6 - 23 mg/dL Final   11/08/2022 67 (H) 6 - 23 mg/dL Final   11/07/2022 65 (H) 6 - 23 mg/dL Final     Creatinine   Date Value Ref Range Status   11/09/2022 1.8 (H) 0.5 - 1.0 mg/dL Final   11/08/2022 1.9 (H) 0.5 - 1.0 mg/dL Final   11/07/2022 1.9 (H) 0.5 - 1.0 mg/dL Final     Glucose   Date Value Ref Range Status   11/09/2022 349 (H) 74 - 99 mg/dL Final   11/08/2022 252 (H) 74 - 99 mg/dL Final   11/07/2022 181 (H) 74 - 99 mg/dL Final     Calcium   Date Value Ref Range Status   11/09/2022 9.2 8.6 - 10.2 mg/dL Final   11/08/2022 8.5 (L) 8.6 - 10.2 mg/dL Final   11/07/2022 8.8 8.6 - 10.2 mg/dL Final     Total Protein   Date Value Ref Range Status   11/07/2022 5.3 (L) 6.4 - 8.3 g/dL Final   10/14/2022 6.3 (L) 6.4 - 8.3 g/dL Final   10/13/2022 5.6 (L) 6.4 - 8.3 g/dL Final Albumin   Date Value Ref Range Status   11/07/2022 3.3 (L) 3.5 - 5.2 g/dL Final   10/14/2022 3.5 3.5 - 5.2 g/dL Final   10/13/2022 3.1 (L) 3.5 - 5.2 g/dL Final     Total Bilirubin   Date Value Ref Range Status   11/07/2022 0.4 0.0 - 1.2 mg/dL Final   10/14/2022 0.3 0.0 - 1.2 mg/dL Final   10/13/2022 0.3 0.0 - 1.2 mg/dL Final     Alkaline Phosphatase   Date Value Ref Range Status   11/07/2022 74 35 - 104 U/L Final   10/14/2022 98 35 - 104 U/L Final   10/13/2022 87 35 - 104 U/L Final     AST   Date Value Ref Range Status   11/07/2022 7 0 - 31 U/L Final   10/14/2022 7 0 - 31 U/L Final   10/13/2022 12 0 - 31 U/L Final     Comment:     Specimen is slightly Hemolyzed. Result may be artificially increased. ALT   Date Value Ref Range Status   11/07/2022 <5 0 - 32 U/L Final   10/14/2022 <5 0 - 32 U/L Final   10/13/2022 <5 0 - 32 U/L Final     Est, Glom Filt Rate   Date Value Ref Range Status   11/09/2022 29 >=60 mL/min/1.73 Final     Comment:     Pediatric calculator link  [email or web address]. [email or web address]  Effective Oct 3, 2022  These results are not intended for use in patients  <25years of age. eGFR results are calculated without  a race factor using the 2021 CKD-EPI equation. Careful  clinical correlation is recommended, particularly when  comparing to results calculated using previous equations. The CKD-EPI equation is less accurate in patients with  extremes of muscle mass, extra-renal metabolism of  creatinine, excessive creatinine ingestion, or following  therapy that affects renal tubular secretion. 11/08/2022 28 >=60 mL/min/1.73 Final     Comment:     Pediatric calculator link  [email or web address]. [email or web address]_[email or web address]  Effective Oct 3, 2022  These results are not intended for use in patients  <25years of age. eGFR results are calculated without  a race factor using the 2021 CKD-EPI equation.   Careful  clinical correlation is recommended, particularly when  comparing to results calculated using previous equations. The CKD-EPI equation is less accurate in patients with  extremes of muscle mass, extra-renal metabolism of  creatinine, excessive creatinine ingestion, or following  therapy that affects renal tubular secretion. 11/07/2022 28 >=60 mL/min/1.73 Final     Comment:     Pediatric calculator link  [email or web address] [email or web address]  Effective Oct 3, 2022  These results are not intended for use in patients  <25years of age. eGFR results are calculated without  a race factor using the 2021 CKD-EPI equation. Careful  clinical correlation is recommended, particularly when  comparing to results calculated using previous equations. The CKD-EPI equation is less accurate in patients with  extremes of muscle mass, extra-renal metabolism of  creatinine, excessive creatinine ingestion, or following  therapy that affects renal tubular secretion. GFR    Date Value Ref Range Status   10/16/2022 >60  Final   10/14/2022 59  Final   10/13/2022 >60  Final     Magnesium   Date Value Ref Range Status   10/14/2022 2.1 1.6 - 2.6 mg/dL Final   10/08/2022 1.9 1.6 - 2.6 mg/dL Final   10/07/2022 1.8 1.6 - 2.6 mg/dL Final     Phosphorus   Date Value Ref Range Status   10/08/2022 3.0 2.5 - 4.5 mg/dL Final   10/07/2022 2.8 2.5 - 4.5 mg/dL Final   10/06/2022 2.6 2.5 - 4.5 mg/dL Final     Recent Labs     11/07/22  0549   PH 7.356   PO2 120.1*   PCO2 65.0*   HCO3 35.6*   BE 8.4*   O2SAT 97.9         RADIOLOGY:  CT CHEST WO CONTRAST   Final Result   Bilateral pleural effusions large on the right and small on the left with   collapse of the right middle and lower lobe. Four-chamber cardiac   enlargement with enlargement of the pulmonary arteries to suggest pulmonary   arterial hypertension and small pericardial effusion. Findings raise a   concern for possible failure. XR CHEST PORTABLE   Final Result   1.  Small to moderate right pleural effusion   2. Right lung base atelectasis   3. Cardiomegaly                 PROBLEM LIST:  Active Problems:    Acute decompensated heart failure (HCC)    Acute kidney injury ([address]    Acute respiratory failure with hypoxia and hypercapnia (HCC)    Acute on chronic diastolic heart failure (HCC)    Acute on chronic respiratory failure (HCC)    Elevated troponin    Hyperkalemia    Depression with anxiety    Hyperlipidemia    Obstructive sleep apnea syndrome    Atrial fibrillation with RVR ([address]  Resolved Problems:    * No resolved hospital problems.  Herman Archuleta MD  Pulmonary & Critical Care  11/9/2022 4:01 PM

## 2022-11-10 LAB
METER GLUCOSE: 236 MG/DL (ref 74–99)
METER GLUCOSE: 252 MG/DL (ref 74–99)
METER GLUCOSE: 333 MG/DL (ref 74–99)
METER GLUCOSE: 385 MG/DL (ref 74–99)

## 2022-11-10 PROCEDURE — 99232 SBSQ HOSP IP/OBS MODERATE 35: CPT | Performed by: INTERNAL MEDICINE

## 2022-11-10 PROCEDURE — 6370000000 HC RX 637 (ALT 250 FOR IP): Performed by: INTERNAL MEDICINE

## 2022-11-10 PROCEDURE — 97530 THERAPEUTIC ACTIVITIES: CPT

## 2022-11-10 PROCEDURE — 2580000003 HC RX 258: Performed by: INTERNAL MEDICINE

## 2022-11-10 PROCEDURE — 94640 AIRWAY INHALATION TREATMENT: CPT

## 2022-11-10 PROCEDURE — 6370000000 HC RX 637 (ALT 250 FOR IP)

## 2022-11-10 PROCEDURE — 2060000000 HC ICU INTERMEDIATE R&B

## 2022-11-10 PROCEDURE — 2500000003 HC RX 250 WO HCPCS: Performed by: INTERNAL MEDICINE

## 2022-11-10 PROCEDURE — 82962 GLUCOSE BLOOD TEST: CPT

## 2022-11-10 PROCEDURE — 6360000002 HC RX W HCPCS: Performed by: INTERNAL MEDICINE

## 2022-11-10 PROCEDURE — 99233 SBSQ HOSP IP/OBS HIGH 50: CPT | Performed by: INTERNAL MEDICINE

## 2022-11-10 PROCEDURE — 92526 ORAL FUNCTION THERAPY: CPT

## 2022-11-10 PROCEDURE — 6370000000 HC RX 637 (ALT 250 FOR IP): Performed by: NURSE PRACTITIONER

## 2022-11-10 PROCEDURE — 97110 THERAPEUTIC EXERCISES: CPT

## 2022-11-10 PROCEDURE — 2700000000 HC OXYGEN THERAPY PER DAY

## 2022-11-10 PROCEDURE — 94660 CPAP INITIATION&MGMT: CPT

## 2022-11-10 RX ORDER — METOLAZONE 2.5 MG/1
5 TABLET ORAL DAILY
Status: DISCONTINUED | OUTPATIENT
Start: 2022-11-10 | End: 2022-11-12 | Stop reason: HOSPADM

## 2022-11-10 RX ADMIN — CARBIDOPA AND LEVODOPA 2 TABLET: 25; 100 TABLET, EXTENDED RELEASE ORAL at 09:48

## 2022-11-10 RX ADMIN — ROPINIROLE 1 MG: 1 TABLET, FILM COATED ORAL at 21:11

## 2022-11-10 RX ADMIN — SUCRALFATE 1 G: 1 TABLET ORAL at 17:07

## 2022-11-10 RX ADMIN — INSULIN LISPRO 6 UNITS: 100 INJECTION, SOLUTION INTRAVENOUS; SUBCUTANEOUS at 17:19

## 2022-11-10 RX ADMIN — LORAZEPAM 0.5 MG: 0.5 TABLET ORAL at 22:51

## 2022-11-10 RX ADMIN — DIVALPROEX SODIUM 250 MG: 250 TABLET, DELAYED RELEASE ORAL at 09:47

## 2022-11-10 RX ADMIN — IPRATROPIUM BROMIDE AND ALBUTEROL SULFATE 1 AMPULE: .5; 2.5 SOLUTION RESPIRATORY (INHALATION) at 09:37

## 2022-11-10 RX ADMIN — ANTI-FUNGAL POWDER MICONAZOLE NITRATE TALC FREE 1 EACH: 1.42 POWDER TOPICAL at 09:44

## 2022-11-10 RX ADMIN — PANTOPRAZOLE SODIUM 40 MG: 40 TABLET, DELAYED RELEASE ORAL at 09:48

## 2022-11-10 RX ADMIN — IPRATROPIUM BROMIDE AND ALBUTEROL SULFATE 1 AMPULE: .5; 2.5 SOLUTION RESPIRATORY (INHALATION) at 18:43

## 2022-11-10 RX ADMIN — SUCRALFATE 1 G: 1 TABLET ORAL at 21:11

## 2022-11-10 RX ADMIN — METHYLPREDNISOLONE SODIUM SUCCINATE 30 MG: 40 INJECTION, POWDER, FOR SOLUTION INTRAMUSCULAR; INTRAVENOUS at 04:48

## 2022-11-10 RX ADMIN — AMIODARONE HYDROCHLORIDE 200 MG: 200 TABLET ORAL at 09:48

## 2022-11-10 RX ADMIN — CARBIDOPA AND LEVODOPA 2 TABLET: 25; 100 TABLET, EXTENDED RELEASE ORAL at 21:11

## 2022-11-10 RX ADMIN — SUCRALFATE 1 G: 1 TABLET ORAL at 09:48

## 2022-11-10 RX ADMIN — DIVALPROEX SODIUM 250 MG: 250 TABLET, DELAYED RELEASE ORAL at 21:11

## 2022-11-10 RX ADMIN — IPRATROPIUM BROMIDE AND ALBUTEROL SULFATE 1 AMPULE: .5; 2.5 SOLUTION RESPIRATORY (INHALATION) at 13:43

## 2022-11-10 RX ADMIN — BUDESONIDE 500 MCG: 0.5 SUSPENSION RESPIRATORY (INHALATION) at 18:42

## 2022-11-10 RX ADMIN — INSULIN GLARGINE 13 UNITS: 100 INJECTION, SOLUTION SUBCUTANEOUS at 21:14

## 2022-11-10 RX ADMIN — FUROSEMIDE 40 MG: 10 INJECTION, SOLUTION INTRAMUSCULAR; INTRAVENOUS at 09:45

## 2022-11-10 RX ADMIN — ACETAMINOPHEN 650 MG: 325 TABLET ORAL at 04:47

## 2022-11-10 RX ADMIN — METHYLPREDNISOLONE SODIUM SUCCINATE 30 MG: 40 INJECTION, POWDER, FOR SOLUTION INTRAMUSCULAR; INTRAVENOUS at 17:06

## 2022-11-10 RX ADMIN — IPRATROPIUM BROMIDE AND ALBUTEROL SULFATE 1 AMPULE: .5; 2.5 SOLUTION RESPIRATORY (INHALATION) at 05:20

## 2022-11-10 RX ADMIN — ATORVASTATIN CALCIUM 20 MG: 20 TABLET, FILM COATED ORAL at 21:11

## 2022-11-10 RX ADMIN — DOXYCYCLINE 100 MG: 100 INJECTION, POWDER, LYOPHILIZED, FOR SOLUTION INTRAVENOUS at 03:29

## 2022-11-10 RX ADMIN — CARBIDOPA AND LEVODOPA 2 TABLET: 25; 100 TABLET, EXTENDED RELEASE ORAL at 17:05

## 2022-11-10 RX ADMIN — METOLAZONE 5 MG: 2.5 TABLET ORAL at 17:10

## 2022-11-10 RX ADMIN — ROPINIROLE 1 MG: 1 TABLET, FILM COATED ORAL at 17:07

## 2022-11-10 RX ADMIN — ROPINIROLE 1 MG: 1 TABLET, FILM COATED ORAL at 09:47

## 2022-11-10 RX ADMIN — APIXABAN 5 MG: 5 TABLET, FILM COATED ORAL at 21:11

## 2022-11-10 RX ADMIN — SODIUM CHLORIDE, PRESERVATIVE FREE 10 ML: 5 INJECTION INTRAVENOUS at 09:45

## 2022-11-10 RX ADMIN — INSULIN LISPRO 8 UNITS: 100 INJECTION, SOLUTION INTRAVENOUS; SUBCUTANEOUS at 12:09

## 2022-11-10 RX ADMIN — INSULIN GLARGINE 13 UNITS: 100 INJECTION, SOLUTION SUBCUTANEOUS at 09:45

## 2022-11-10 RX ADMIN — BUDESONIDE 500 MCG: 0.5 SUSPENSION RESPIRATORY (INHALATION) at 05:20

## 2022-11-10 RX ADMIN — APIXABAN 5 MG: 5 TABLET, FILM COATED ORAL at 09:48

## 2022-11-10 RX ADMIN — ANTI-FUNGAL POWDER MICONAZOLE NITRATE TALC FREE 1 EACH: 1.42 POWDER TOPICAL at 21:19

## 2022-11-10 RX ADMIN — ACETAMINOPHEN 650 MG: 325 TABLET ORAL at 18:12

## 2022-11-10 RX ADMIN — INSULIN LISPRO 2 UNITS: 100 INJECTION, SOLUTION INTRAVENOUS; SUBCUTANEOUS at 09:46

## 2022-11-10 RX ADMIN — DOXYCYCLINE 100 MG: 100 INJECTION, POWDER, LYOPHILIZED, FOR SOLUTION INTRAVENOUS at 17:19

## 2022-11-10 RX ADMIN — SUCRALFATE 1 G: 1 TABLET ORAL at 12:09

## 2022-11-10 ASSESSMENT — ENCOUNTER SYMPTOMS
TACHYPNEA: 1
SHORTNESS OF BREATH: 1
EYES NEGATIVE: 1
ALLERGIC/IMMUNOLOGIC NEGATIVE: 1
GASTROINTESTINAL NEGATIVE: 1
COUGH: 1

## 2022-11-10 ASSESSMENT — PAIN SCALES - GENERAL
PAINLEVEL_OUTOF10: 0
PAINLEVEL_OUTOF10: 0
PAINLEVEL_OUTOF10: 6

## 2022-11-10 ASSESSMENT — PAIN DESCRIPTION - LOCATION: LOCATION: BACK

## 2022-11-10 ASSESSMENT — PAIN - FUNCTIONAL ASSESSMENT: PAIN_FUNCTIONAL_ASSESSMENT: PREVENTS OR INTERFERES SOME ACTIVE ACTIVITIES AND ADLS

## 2022-11-10 ASSESSMENT — PAIN DESCRIPTION - ORIENTATION: ORIENTATION: MID

## 2022-11-10 ASSESSMENT — PAIN SCALES - WONG BAKER: WONGBAKER_NUMERICALRESPONSE: 0

## 2022-11-10 ASSESSMENT — PAIN DESCRIPTION - DESCRIPTORS: DESCRIPTORS: ACHING;DISCOMFORT

## 2022-11-10 NOTE — PROGRESS NOTES
Patient agitated, tachycardic, SPO2 89-90, refusing to wear BiPAP states she needs Ativan because she is claustrophobic. Trina Rico NP notified.

## 2022-11-10 NOTE — PROGRESS NOTES
Pulmonary/Critical Care Progress Note    We are following patient for resp failure with hypoxia and hypercapnea    ISSUES:    Decompensated acute on chronic respiratory failure  Recurrent right effusion   Prior right chest tube in July  CT scan confirms recurrent R >> L sided Pleural Effusion with associated lung compression   Fluid transudative in past c/w CHF  Continue to treat CHF with diuresis   No indication for thoracentesis    Acute on Chronic Hypercapneic Respiratory Failure   COPD and SERENA  BiPAP QHS   Was not using before  ABGs much improved  Encouraged complaince    Atrial fibrillation  Rate controlled  Being followed by cardiology    11/10/22 Overall feeling better, walked around  Decreasing O2 requirement  Encouraged use of BiPAP  Does well with Ativan QHS to help with claustrophobia  Diuresing    Continue present management     Counseled  All questions answered   ______________________________________________    SUBJECTIVE:  67year old with SERENA, obesity,COPD with chronic chf and atrial fib. Progressively worse coming to the hospital and with ABG deragements placed on bipapp. Improvement thereafter and transitions to nc to eat and drink. PMHx: Reviewed by chart, dm, htn, hypelipd, copd, CKD, as well parkinsons disease and CAD. Plus above.     MEDICATIONS:   insulin lispro  0-4 Units SubCUTAneous Nightly    insulin lispro  0-8 Units SubCUTAneous TID WC    amiodarone  200 mg Oral Daily    apixaban  5 mg Oral BID    doxycycline (VIBRAMYCIN) IV  100 mg IntraVENous Q12H    methylPREDNISolone  30 mg IntraVENous 3 times per day    atorvastatin  20 mg Oral Nightly    budesonide  0.5 mg Nebulization BID    carbidopa-levodopa  2 tablet Oral TID    divalproex  250 mg Oral BID    insulin glargine  13 Units SubCUTAneous BID    miconazole  1 each Topical BID    pantoprazole  40 mg Oral QAM    rOPINIRole  1 mg Oral TID    sucralfate  1 g Oral 4x Daily    sodium chloride flush  5-40 mL IntraVENous 2 times per day furosemide  40 mg IntraVENous BID    ipratropium-albuterol  1 ampule Inhalation Q4H WA      dextrose      sodium chloride       LORazepam, glucose, dextrose bolus **OR** dextrose bolus, glucagon (rDNA), dextrose, acetaminophen, docusate sodium, sodium chloride flush, sodium chloride, ondansetron **OR** ondansetron, polyethylene glycol, acetaminophen **OR** acetaminophen    Review of Systems   Constitutional:  Positive for fatigue. HENT: Negative. Eyes: Negative. Respiratory:  Positive for cough and shortness of breath. Cardiovascular: Negative. Gastrointestinal: Negative. Endocrine: Negative. Genitourinary: Negative. Musculoskeletal: Negative. Skin: Negative. Allergic/Immunologic: Negative. Neurological:  Positive for weakness. Hematological: Negative. Psychiatric/Behavioral: Negative.        OBJECTIVE:  Vitals:    11/10/22 0730   BP: 127/68   Pulse: (!) 104   Resp: 20   Temp: 97.9 °F (36.6 °C)   SpO2: 94%     FiO2 : 50 %  O2 Flow Rate (L/min): 2 L/min  O2 Device: Nasal cannula    PHYSICAL EXAM:  Constitutional: Alert and talkative obese woman  Skin: no breakdown and no ischemia  HEENT: Normocephalic and supple  Neck: No  jvd or bruits  Cardiovascular: Rr irreg and no murmer  Respiratory: deminished and few late wheezes  Gastrointestinal: soft and bowel sounds intact  Genitourinary: deferred  Extremities: no dvt of ischemia  Neurological: intact grossly talkative and interactive   Psychological: appropriate      LABS:  WBC   Date Value Ref Range Status   11/09/2022 6.5 4.5 - 11.5 E9/L Final   11/08/2022 4.5 4.5 - 11.5 E9/L Final   11/07/2022 7.6 4.5 - 11.5 E9/L Final     Hemoglobin   Date Value Ref Range Status   11/09/2022 10.1 (L) 11.5 - 15.5 g/dL Final   11/08/2022 8.5 (L) 11.5 - 15.5 g/dL Final   11/07/2022 9.3 (L) 11.5 - 15.5 g/dL Final     Hematocrit   Date Value Ref Range Status   11/09/2022 35.8 34.0 - 48.0 % Final   11/08/2022 29.8 (L) 34.0 - 48.0 % Final   11/07/2022 34.2 34.0 - 48.0 % Final     MCV   Date Value Ref Range Status   11/09/2022 92.5 80.0 - 99.9 fL Final   11/08/2022 92.3 80.0 - 99.9 fL Final   11/07/2022 93.2 80.0 - 99.9 fL Final     Platelets   Date Value Ref Range Status   11/09/2022 164 130 - 450 E9/L Final   11/08/2022 143 130 - 450 E9/L Final   11/07/2022 193 130 - 450 E9/L Final     Sodium   Date Value Ref Range Status   11/09/2022 138 132 - 146 mmol/L Final   11/08/2022 137 132 - 146 mmol/L Final   11/07/2022 139 132 - 146 mmol/L Final     Potassium   Date Value Ref Range Status   11/08/2022 5.2 (H) 3.5 - 5.0 mmol/L Final   11/06/2022 5.1 (H) 3.5 - 5.0 mmol/L Final   10/16/2022 4.3 3.5 - 5.0 mmol/L Final     Potassium reflex Magnesium   Date Value Ref Range Status   11/09/2022 4.8 3.5 - 5.0 mmol/L Final   11/07/2022 5.5 (H) 3.5 - 5.0 mmol/L Final   10/13/2022 4.2 3.5 - 5.0 mmol/L Final     Chloride   Date Value Ref Range Status   11/09/2022 91 (L) 98 - 107 mmol/L Final   11/08/2022 92 (L) 98 - 107 mmol/L Final   11/07/2022 96 (L) 98 - 107 mmol/L Final     CO2   Date Value Ref Range Status   11/09/2022 36 (H) 22 - 29 mmol/L Final   11/08/2022 34 (H) 22 - 29 mmol/L Final   11/07/2022 35 (H) 22 - 29 mmol/L Final     BUN   Date Value Ref Range Status   11/09/2022 69 (H) 6 - 23 mg/dL Final   11/08/2022 67 (H) 6 - 23 mg/dL Final   11/07/2022 65 (H) 6 - 23 mg/dL Final     Creatinine   Date Value Ref Range Status   11/09/2022 1.8 (H) 0.5 - 1.0 mg/dL Final   11/08/2022 1.9 (H) 0.5 - 1.0 mg/dL Final   11/07/2022 1.9 (H) 0.5 - 1.0 mg/dL Final     Glucose   Date Value Ref Range Status   11/09/2022 349 (H) 74 - 99 mg/dL Final   11/08/2022 252 (H) 74 - 99 mg/dL Final   11/07/2022 181 (H) 74 - 99 mg/dL Final     Calcium   Date Value Ref Range Status   11/09/2022 9.2 8.6 - 10.2 mg/dL Final   11/08/2022 8.5 (L) 8.6 - 10.2 mg/dL Final   11/07/2022 8.8 8.6 - 10.2 mg/dL Final     Total Protein   Date Value Ref Range Status   11/07/2022 5.3 (L) 6.4 - 8.3 g/dL Final   10/14/2022 6.3 (L) 6.4 - 8.3 g/dL Final   10/13/2022 5.6 (L) 6.4 - 8.3 g/dL Final     Albumin   Date Value Ref Range Status   11/07/2022 3.3 (L) 3.5 - 5.2 g/dL Final   10/14/2022 3.5 3.5 - 5.2 g/dL Final   10/13/2022 3.1 (L) 3.5 - 5.2 g/dL Final     Total Bilirubin   Date Value Ref Range Status   11/07/2022 0.4 0.0 - 1.2 mg/dL Final   10/14/2022 0.3 0.0 - 1.2 mg/dL Final   10/13/2022 0.3 0.0 - 1.2 mg/dL Final     Alkaline Phosphatase   Date Value Ref Range Status   11/07/2022 74 35 - 104 U/L Final   10/14/2022 98 35 - 104 U/L Final   10/13/2022 87 35 - 104 U/L Final     AST   Date Value Ref Range Status   11/07/2022 7 0 - 31 U/L Final   10/14/2022 7 0 - 31 U/L Final   10/13/2022 12 0 - 31 U/L Final     Comment:     Specimen is slightly Hemolyzed. Result may be artificially increased. ALT   Date Value Ref Range Status   11/07/2022 <5 0 - 32 U/L Final   10/14/2022 <5 0 - 32 U/L Final   10/13/2022 <5 0 - 32 U/L Final     Est, Glom Filt Rate   Date Value Ref Range Status   11/09/2022 29 >=60 mL/min/1.73 Final     Comment:     Pediatric calculator link  [facility].. [email or web address]  Effective Oct 3, 2022  These results are not intended for use in patients  <25years of age. eGFR results are calculated without  a race factor using the 2021 CKD-EPI equation. Careful  clinical correlation is recommended, particularly when  comparing to results calculated using previous equations. The CKD-EPI equation is less accurate in patients with  extremes of muscle mass, extra-renal metabolism of  creatinine, excessive creatinine ingestion, or following  therapy that affects renal tubular secretion. 11/08/2022 28 >=60 mL/min/1.73 Final     Comment:     Pediatric calculator link  [email or web address] [email or web address]  Effective Oct 3, 2022  These results are not intended for use in patients  <25years of age.   eGFR results are calculated without  a race factor using the 2021 CKD-EPI equation. Careful  clinical correlation is recommended, particularly when  comparing to results calculated using previous equations. The CKD-EPI equation is less accurate in patients with  extremes of muscle mass, extra-renal metabolism of  creatinine, excessive creatinine ingestion, or following  therapy that affects renal tubular secretion. 11/07/2022 28 >=60 mL/min/1.73 Final     Comment:     Pediatric calculator link  [email or web address] [email or web address]  Effective Oct 3, 2022  These results are not intended for use in patients  <25years of age. eGFR results are calculated without  a race factor using the 2021 CKD-EPI equation. Careful  clinical correlation is recommended, particularly when  comparing to results calculated using previous equations. The CKD-EPI equation is less accurate in patients with  extremes of muscle mass, extra-renal metabolism of  creatinine, excessive creatinine ingestion, or following  therapy that affects renal tubular secretion. GFR    Date Value Ref Range Status   10/16/2022 >60  Final   10/14/2022 59  Final   10/13/2022 >60  Final     Magnesium   Date Value Ref Range Status   10/14/2022 2.1 1.6 - 2.6 mg/dL Final   10/08/2022 1.9 1.6 - 2.6 mg/dL Final   10/07/2022 1.8 1.6 - 2.6 mg/dL Final     Phosphorus   Date Value Ref Range Status   10/08/2022 3.0 2.5 - 4.5 mg/dL Final   10/07/2022 2.8 2.5 - 4.5 mg/dL Final   10/06/2022 2.6 2.5 - 4.5 mg/dL Final     No results for input(s): PH, PO2, PCO2, HCO3, BE, O2SAT in the last 72 hours. RADIOLOGY:  CT CHEST WO CONTRAST   Final Result   Bilateral pleural effusions large on the right and small on the left with   collapse of the right middle and lower lobe. Four-chamber cardiac   enlargement with enlargement of the pulmonary arteries to suggest pulmonary   arterial hypertension and small pericardial effusion. Findings raise a   concern for possible failure.          XR CHEST PORTABLE Final Result   1. Small to moderate right pleural effusion   2. Right lung base atelectasis   3. Cardiomegaly                 PROBLEM LIST:  Active Problems:    Acute decompensated heart failure (HCC)    Acute kidney injury ([address]    Acute respiratory failure with hypoxia and hypercapnia (HCC)    Acute on chronic diastolic heart failure (HCC)    Acute on chronic respiratory failure (HCC)    Elevated troponin    Hyperkalemia    Depression with anxiety    Hyperlipidemia    Obstructive sleep apnea syndrome    Atrial fibrillation with RVR ([address]  Resolved Problems:    * No resolved hospital problems.  Herman Archuleta MD  Pulmonary & Critical Care  11/10/2022 1:06 PM

## 2022-11-10 NOTE — PROGRESS NOTES
Admitting Date and Time: 11/6/2022 10:01 AM  Admit Dx: COPD exacerbation ([facility] [address] [J44.1]  Acute on chronic respiratory failure (HCC) [J96.20]  Acute on chronic respiratory failure with hypercapnia (HCC) [J96.22]  Hypercapnic respiratory failure (HCC) [J96.92]    Subjective:    After ativan last night she decribes, she was complaint to cpap    ROS: denies fever, chills, cp, sob, n/v, HA unless stated above.     metOLazone  5 mg Oral Daily    furosemide  40 mg IntraVENous TID    insulin lispro  0-4 Units SubCUTAneous Nightly    insulin lispro  0-8 Units SubCUTAneous TID WC    amiodarone  200 mg Oral Daily    apixaban  5 mg Oral BID    doxycycline (VIBRAMYCIN) IV  100 mg IntraVENous Q12H    methylPREDNISolone  30 mg IntraVENous 3 times per day    atorvastatin  20 mg Oral Nightly    budesonide  0.5 mg Nebulization BID    carbidopa-levodopa  2 tablet Oral TID    divalproex  250 mg Oral BID    insulin glargine  13 Units SubCUTAneous BID    miconazole  1 each Topical BID    pantoprazole  40 mg Oral QAM    rOPINIRole  1 mg Oral TID    sucralfate  1 g Oral 4x Daily    sodium chloride flush  5-40 mL IntraVENous 2 times per day    ipratropium-albuterol  1 ampule Inhalation Q4H WA     LORazepam, 0.5 mg, Nightly PRN  glucose, 4 tablet, PRN  dextrose bolus, 125 mL, PRN   Or  dextrose bolus, 250 mL, PRN  glucagon (rDNA), 1 mg, PRN  dextrose, , Continuous PRN  acetaminophen, 650 mg, Q4H PRN  docusate sodium, 100 mg, BID PRN  sodium chloride flush, 5-40 mL, PRN  sodium chloride, , PRN  ondansetron, 4 mg, Q8H PRN   Or  ondansetron, 4 mg, Q6H PRN  polyethylene glycol, 17 g, Daily PRN  acetaminophen, 650 mg, Q6H PRN   Or  acetaminophen, 650 mg, Q6H PRN       Objective:    /68   Pulse (!) 104   Temp 97.9 °F (36.6 °C)   Resp 20   Ht 5' (1.524 m)   Wt 235 lb (106.6 kg)   SpO2 94%   BMI 45.90 kg/m²   General Appearance: alert and oriented to person, place and time and in no acute distress  Skin: warm and dry  Head: normocephalic and atraumatic  Eyes: pupils equal, round, and reactive to light, extraocular eye movements intact, conjunctivae normal  Neck: neck supple and non tender without mass   Pulmonary/Chest: clear to auscultation bilaterally- no wheezes, rales or rhonchi, normal air movement, no respiratory distress  Cardiovascular: normal rate, normal S1 and S2 and no carotid bruits  Abdomen: soft, non-tender, non-distended, normal bowel sounds, no masses or organomegaly  Extremities: no cyanosis, no clubbing and   Mild / trace edema  Neurologic: no cranial nerve deficit and speech normal      Recent Labs     11/08/22  0546 11/09/22  1033    138   K 5.2* 4.8   CL 92* 91*   CO2 34* 36*   BUN 67* 69*   CREATININE 1.9* 1.8*   GLUCOSE 252* 349*   CALCIUM 8.5* 9.2         No results for input(s): ALKPHOS, PROT, LABALBU, BILITOT, AST, ALT in the last 72 hours. Recent Labs     11/08/22  0546 11/09/22  1033   WBC 4.5 6.5   RBC 3.23* 3.87   HGB 8.5* 10.1*   HCT 29.8* 35.8   MCV 92.3 92.5   MCH 26.3 26.1   MCHC 28.5* 28.2*   RDW 15.2* 14.8    164   MPV 10.1 10.6             Radiology:   CT CHEST WO CONTRAST   Final Result   Bilateral pleural effusions large on the right and small on the left with   collapse of the right middle and lower lobe. Four-chamber cardiac   enlargement with enlargement of the pulmonary arteries to suggest pulmonary   arterial hypertension and small pericardial effusion. Findings raise a   concern for possible failure. XR CHEST PORTABLE   Final Result   1. Small to moderate right pleural effusion   2. Right lung base atelectasis   3.  Cardiomegaly             Assessment:  Active Problems:    Acute decompensated heart failure (HCC)    Acute kidney injury ([address]    Acute respiratory failure with hypoxia and hypercapnia (HCC)    Acute on chronic diastolic heart failure (HCC)    Acute on chronic respiratory failure (HCC)    Elevated troponin    Hyperkalemia    Depression with anxiety Hyperlipidemia    Obstructive sleep apnea syndrome    Atrial fibrillation with RVR ([address]  Resolved Problems:    * No resolved hospital problems. *      Plan: History of present illness from History and Physical:  The patient is a 67 y.o. female with significant past medical history of CHF, A. Fib, Hypertension Essential, Hx of Breast Ca who presents with weakness and lower extremity swelling. She noted that for the last 2 days she is feeling more tired this has progressively gotten worse started today. She was admitted to increased lower extremity edema. She denied fever chills no nausea no vomiting no cough. In the ER she was found to be lethargic. ABG was suggestive of hypercapnia. Her pH was 7.24, PCO2 was 80. She was placed on BiPAP and admitted for further evaluation. In the ER she was also noted to have irregular heartbeat with heart rate persistently above 100 with high of 130s and low of 104. Blood pressure was also low. She is being admitted for further management. EKG in the ER shows A. fib with RVR. Due to fluid overload and uncontrolled atrial fibrillation she was given loading dose of digoxin and additional 2 doses of every 6 hours after the initial dose. Decompensated acute on chronic respiratory failure: Volume overload. Input by pulmonary appreciated who has her on doxy now  Acute decompensated diastolic heart failure. Initially with hypotension therefore diuretics was not given it initially. Cardiology notes: econd admission in less than a month. Diuretic therapy continues to be adjusted  11/10 by cardio  MICKI guillen rapid ventricular response was on amiodarone at home initially held here. Cardio adjusted digoxin . Cardiology adjusting anti arrhythmics. H/o beta-blocker and amiodarone induced bradycardia. History of digitoxicity  Obstructive sleep apnea BiPAP ordered & recently compliant. 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     obesity needs lifestyle modification follow-up as an outpatient  COPD continue current care not in exacerbation. There is also moderate pulmonary htn  Type II diabetes sliding scale diabetic diet current insulin regimen  Hyperlipidemia on statin  Skin fold rash nystatin powder  Insomnia Ambien initially on hold due to hypercapnia and I will continue to hold it due to dementia. However she did get ativan 10/9 p to help with phobia of cpap. Dementia possibly due to her known Parkinsons continue Sinemet  DVT prophylaxis: Eliquis  Full code. Disposition: Community skilled when ready possibly in 1-2 days    NOTE: This report was transcribed using voice recognition software. Every effort was made to ensure accuracy; however, inadvertent computerized transcription errors may be present.      Electronically signed by Isiah Alvarez MD on 11/10/2022 at 3:28 PM

## 2022-11-10 NOTE — PROGRESS NOTES
Physical Therapy    Physical Therapy Treatment Note/Plan of Care    Room #:  5720/8214-46  Patient Name: Narendra Khan  YOB: 1950  MRN: [id number]    Date of Service: 11/10/2022     Tentative placement recommendation: [facility] with Physical Therapy   Equipment recommendation: Equipment at [address] Physical Therapist: Randy Betts, [address] [facility]  #[id number]      Specific Provider Orders/Date/Referring Provider :  11/07/22 1445    PT eval and treat  Start:  11/07/22 1445,   End:  11/07/22 1445,   ONE TIME,   Standing Count:  1 Occurrences,   R         Tori Pineda DO     Admitting Diagnosis:   COPD exacerbation ([facility]) [J44.1]  Acute on chronic respiratory failure ([facility]) [J96.20]  Acute on chronic respiratory failure with hypercapnia ([facility]) [J96.22]  Hypercapnic respiratory failure ([facility]) [J96.92]    Admitted with    shortness of breath bilateral lower extremities swelling  Surgery: none      Patient Active Problem List   Diagnosis    Depression with anxiety    Osteoporosis    Asthma    Hyperlipidemia    Mitral regurgitation    Obstructive sleep apnea syndrome    Psoriasis    Diabetes mellitus ([facility])    Parkinson's disease ([facility])    Primary hypertension    Microalbuminuria    Morbid obesity ([facility])    RLS (restless legs syndrome)    Generalized weakness    Inability to walk    Hypothyroidism    Chest pain    Acute asthma exacerbation    Asthma exacerbation, mild    Paroxysmal atrial fibrillation (HCC)    Atrial fibrillation with RVR ([facility])    Acute on chronic congestive heart failure ([facility])    Coronary artery disease involving native coronary artery of native heart without angina pectoris    Dysphagia    Hepatosplenomegaly    Acute decompensated heart failure (HCC)    Acute diastolic (congestive) heart failure ([facility])    Nonrheumatic tricuspid valve regurgitation    Tobacco abuse    Recurrent syncope    Septic shock ([facility])    Seizure-like activity ([facility])    Acute kidney injury ([facility]) Pancytopenia (HCC)    Thrombocytopenia (HCC)    Encephalopathy    Acute respiratory failure with hypoxia (HCC)    Lactic acidosis    Delirium    Acute on chronic anemia    Pleural effusion, right    Acute respiratory failure with hypoxia and hypercapnia (HCC)    Acute confusion    Acute on chronic diastolic heart failure (HCC)    Macrocytosis    Other specified anemias    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[facility] [address]    Puerperal sepsis with acute hypoxic respiratory failure without septic shock (HCC)    Pulmonary HTN (HCC)    Chronic anticoagulation    RVF (right ventricular failure) (HCC)    Metabolic alkalosis    Sepsis (HCC)    COPD exacerbation (HCC)    Acute on chronic respiratory failure with hypercapnia (HCC)    Persistent atrial fibrillation (HCC)    Hypercapnic respiratory failure (HCC)    Acute on chronic respiratory failure (HCC)    Elevated troponin    Hyperkalemia        ASSESSMENT of Current Deficits Patient exhibits decreased strength, balance, and endurance impairing functional mobility, transfers, gait , gait distance, and tolerance to activity are barriers to d/c and require skilled intervention during hospital stay to impact mobility and tolerance to standing. Decreased strength, balance and endurance  increases patient's risk for fall. Patient needing minimal assist for sit to stand transfers and gait training. Pt's SPO2 on 2L after ambulation dropped to 84% and needed 1 minute 11 seconds to recover to 90%. Pt needing rest periods throughout tx session and cues for pursed lip breathing and pacing. On her second walk, I increased her to 4L but she ambulated a further distance, SPO2 dropped to 82% and took 1 minute 8 seconds to recover to 90%. Pt gets short of breath and her HR increased in the 180's with ambulating a longer distance. Pt continues to be motivated to work with therapy.       PHYSICAL THERAPY  PLAN OF CARE       Physical therapy plan of care is established based on physician order, patient diagnosis and clinical assessment    Current Treatment Recommendations:    -Bed Mobility: Lower extremity exercises , Upper extremity exercises , and Trunk control activities   -Standing Balance: Perform strengthening exercises in standing to promote motor control with or without upper extremity support   -Transfers: Provide instruction on proper hand and foot position for adequate transfer of weight onto lower extremities and use of gait device if needed and Cues for hand placement, technique and safety. Provide stabilization to prevent fall   -Gait: Gait training and Standing activities to improve: base of support, weight shift, weight bearing    -Endurance: Utilize Supervised activities to increase level of endurance to allow for safe functional mobility including transfers and gait   -Instruction in independent management of O2 line    PT long term treatment goals are located in below grid    Patient and or family understand(s) diagnosis, prognosis, and plan of care. Frequency of treatments: Patient will be seen  daily.          Prior Level of Function: Patient ambulated with wheeled walker short distance, wheelchair user  Rehab Potential: good    for baseline    Past medical history:   Past Medical History:   Diagnosis Date    A-fib ([facility] [address]     Acute on chronic congestive heart failure ([facility] [address]     Anxiety     Asthma     CAD (coronary artery disease) 01/21/2016    Cancer ([facility] [address]  breast ca 2006    right lumpectomy    Chronic kidney disease     nephrolithiasis    COPD exacerbation ([facility] [address] 10/12/2022    Depression     Diabetes mellitus ([address]     H/O mammogram     Hx MRSA infection     toe infection january 2012    Hyperlipidemia     Hypertension     Lateral epicondylitis     Morbid obesity ([address]     SERENA on CPAP     Oxygen dependent     Parkinson's disease ([address]     Tubal ligation status      Past Surgical History:   Procedure Laterality Date    BREAST LUMPECTOMY      BREAST REDUCTION SURGERY      CARDIAC CATHETERIZATION  4/28/2014    Dr. Dougherty Grew  01/11/2022    Dr. Claudette Felling  7/29/15    CT GUIDED CHEST TUBE  7/8/2022    CT GUIDED CHEST TUBE 7/8/2022 Nicole Lewis MD [facility]    ENDOSCOPY, COLON, DIAGNOSTIC  7/19/15    GALLBLADDER SURGERY      LUMBAR LAMINECTOMY      TOE AMPUTATION      TONSILLECTOMY      UPPER GASTROINTESTINAL ENDOSCOPY      UPPER GASTROINTESTINAL ENDOSCOPY N/A 7/19/2022    EGD ESOPHAGOGASTRODUODENOSCOPY performed by Karen Flores MD at [address]:    Precautions: Up as tolerated and Continuous Pulse Oximetry , falls, alarm, and O2 ,  2 Liters of o2 via nasal cannula     Social history: Patient lives in a skilled nursing facility      Equipment owned: Wheelchair, [address] bed,   cane, rollator    AM-PAC Basic Mobility        AM-PAC Mobility Inpatient   How much difficulty turning over in bed?: A Little  How much difficulty sitting down on / standing up from a chair with arms?: A Little  How much difficulty moving from lying on back to sitting on side of bed?: A Little  How much help from another person moving to and from a bed to a chair?: A Little  How much help from another person needed to walk in hospital room?: A Little  How much help from another person for climbing 3-5 steps with a railing?: A Lot  AM-PAC Inpatient Mobility Raw Score : 17  AM-PAC Inpatient T-Scale Score : 42.13  Mobility Inpatient CMS 0-100% Score: 50.57  Mobility Inpatient CMS G-Code Modifier : CK    Nursing cleared patient for PT treatment. OBJECTIVE;   Initial Evaluation  Date: 11/8/2022 Treatment Date:  11/10/2022       Short Term/ Long Term   Goals   Was pt agreeable to Eval/treatment? Yes yes To be met in 3 days   Pain level   0/10    0/10    Bed Mobility    Rolling:  Moderate assist of 1    Supine to sit: Moderate assist of 1    Sit to supine: Not assessed patient in chair    Scooting: Minimal assist of 1   Rolling: Not assessed patient in chair   Supine to sit: Not assessed patient in chair   Sit to supine: Not assessed patient in chair   Scooting: Not assessed patient in chair    Rolling: Minimal assist of 1    Supine to sit: Minimal assist of 1    Sit to supine: Minimal assist of 1    Scooting: Minimal assist of 1     Transfers Sit to stand:  Moderate assist of 1 x 3 reps  Sit to stand: Minimal assist of 1 Cues for hand placement and safety     Sit to stand: Supervision      Ambulation     3x5 forward/backward steps using  wheeled walker with Minimal assist of 1   cues for upright posture, walker approximation, safety, proper hand placement, and pacing 2 x 30 feet; 2 x 50 feet using  wheeled walker with Minimal assist of 1   cues for upright posture, increased base of support, increased step length, safety, and pacing     15 feet using  wheeled walker with Minimal assist of 1    ROM Within functional limits    Increase range of motion 10% of affected joints    Strength BUE:   3/5  RLE:  3/5  LLE:  3/5  Increase strength in affected mm groups by 1/3 grade   Balance Sitting EOB:  fair    Dynamic Standing:  fair with wheeled walker  Sitting EOB/chair: fair +  Dynamic Standing: fair wheeled walker   Sitting EOB:  good    Dynamic Standing: good with wheeled walker      Patient is Alert & Oriented x person, place, time, and situation and follows directions    Sensation:  Patient  reports numbness/tingling bilateral lower extremities     Edema:  yes bilateral lower extremities   Endurance: poor      Vitals:  2 liters nasal cannula   Blood Pressure at rest  Blood Pressure during session    Heart Rate at rest   Heart Rate during session     SPO2 at rest 97%  SPO2 during session 84-90% on 2L; increased to 4L for second walk 82-90% but her distance increased     Patient education  Patient educated on role of Physical Therapy, risks of immobility, safety and plan of care and  importance of mobility while in hospital      Patient response to education:   Pt verbalized understanding Pt demonstrated skill Pt requires further education in this area   Yes Partial Yes      Treatment:  Patient practiced and was instructed/facilitated in the following treatment: Patient    sitting in a chair at start of tx session. Pt educated on pursed lip breathing, pacing, and safety. Pt stood, ambulated in the hallway, and back to the chair. Cues for pursed lip breathing. Pt performed seated exercises. Pt stood, ambulated in the hallway, and back to the chair. Cues for pursed lip breathing. Therapeutic Exercises:  ankle pumps, hip abduction/adduction, long arc quad, and seated marching, x 20 reps. AROM        At end of session, patient in chair with  call light and phone within reach,  all lines and tubes intact, nursing notified. Patient would benefit from continued skilled Physical Therapy to improve functional independence and quality of life. Patient's/ family goals   get stronger    Time in 08:52  Time out 09:30    Total Treatment Time  38 minutes        CPT codes:    Therapeutic activities (70014)   25 minutes  2 unit(s)  Therapeutic exercises (87384)   13 minutes  1 unit(s)    Maynor Manjarrez  [facility]  LIC # [id number]

## 2022-11-10 NOTE — PROGRESS NOTES
Patient is seen in follow-up for CHF. Subjective:     Ms. Maurice Ortiz feels better today  Sitting up in bed no apparent distress    ROS:  CONSTITUTIONAL:  negative for  fevers, chills  HEENT:  negative for earaches, nasal congestion and epistaxis  RESPIRATORY:  negative for  dry cough, cough with sputum,wheezing and hemoptysis  GASTROINTESTINAL:  negative for nausea, vomiting  MUSCULOSKELETAL:  negative for  myalgias, arthralgias  NEUROLOGICAL:  negative for visual disturbance, dysphagia    Medication side effects: none    Scheduled Meds:   insulin lispro  0-4 Units SubCUTAneous Nightly    insulin lispro  0-8 Units SubCUTAneous TID     amiodarone  200 mg Oral Daily    apixaban  5 mg Oral BID    doxycycline (VIBRAMYCIN) IV  100 mg IntraVENous Q12H    methylPREDNISolone  30 mg IntraVENous 3 times per day    atorvastatin  20 mg Oral Nightly    budesonide  0.5 mg Nebulization BID    carbidopa-levodopa  2 tablet Oral TID    divalproex  250 mg Oral BID    insulin glargine  13 Units SubCUTAneous BID    miconazole  1 each Topical BID    pantoprazole  40 mg Oral QAM    rOPINIRole  1 mg Oral TID    sucralfate  1 g Oral 4x Daily    sodium chloride flush  5-40 mL IntraVENous 2 times per day    furosemide  40 mg IntraVENous BID    ipratropium-albuterol  1 ampule Inhalation Q4H WA     Continuous Infusions:   dextrose      sodium chloride       PRN Meds:LORazepam, glucose, dextrose bolus **OR** dextrose bolus, glucagon (rDNA), dextrose, acetaminophen, docusate sodium, sodium chloride flush, sodium chloride, ondansetron **OR** ondansetron, polyethylene glycol, acetaminophen **OR** acetaminophen    I/O last 3 completed shifts:   In: 600 [P.O.:600]  Out: 3000 [Urine:3000]  I/O this shift:  In: 240 [P.O.:240]  Out: -       Objective:      Physical Exam:   /68   Pulse (!) 104   Temp 97.9 °F (36.6 °C)   Resp 20   Ht 5' (1.524 m)   Wt 235 lb (106.6 kg)   SpO2 94%   BMI 45.90 kg/m²   CONSTITUTIONAL:  awake, alert, cooperative, no apparent distress, and appears stated age  HEAD:  normocepalic, without obvious abnormality, atraumatic  NECK:  Supple, symmetrical, trachea midline, no adenopathy, thyroid symmetric, not enlarged and no tenderness, skin normal  LUNGS:  No increased work of breathing, No accessory muscle use or intercostal retractions, decreased air exchange, bilateral rales  CARDIOVASCULAR:  Normal apical impulse, irregularly irregular, normal S1 and S2, no S3, 3/6 systolic murmur at the left lower sternal border,, no edema, no JVD, no carotid bruit. ABDOMEN:  Soft, nontender, no masses, no hepatomegaly, no splenomegaly, BS+  MUSCULOSKELETAL:  No clubbing no cyanosis. there is no redness, warmth, or swelling of the joints  full range of motion noted  NEUROLOGIC:  Alert, awake,oriented x3  SKIN:  no bruising or bleeding, normal skin color, texture, turgor and no redness, warmth, or swelling      Cardiographics  I personally reviewed the telemetry monitor strips with the following interpretation: Atrial fibrillation with controlled ventricular response    Echocardiogram: 7/7/2022,Summary   Technically difficult study - limited visualization. Micro-bubble contrast injected to enhance left ventricular visualization. Normal left ventricular size and systolic function. Ejection fraction is visually estimated at 70-75%. Indeterminate diastolic function. No regional wall motion abnormalities seen. Mild left ventricular concentric hypertrophy noted. Moderately dilated right ventricle with reduced function. Biatrial dilation. Mild tricuspid regurgitation. RVSP is at least 60 mmHg. Prominent pericardial fat pad. No definitive evidence of pericardial effusion. Imaging  CT CHEST WO CONTRAST   Final Result   Bilateral pleural effusions large on the right and small on the left with   collapse of the right middle and lower lobe.   Four-chamber cardiac   enlargement with enlargement of the pulmonary arteries to suggest pulmonary   arterial hypertension and small pericardial effusion. Findings raise a   concern for possible failure. XR CHEST PORTABLE   Final Result   1. Small to moderate right pleural effusion   2. Right lung base atelectasis   3. Cardiomegaly             Lab Review   Lab Results   Component Value Date/Time     11/09/2022 10:33 AM    K 4.8 11/09/2022 10:33 AM    CL 91 11/09/2022 10:33 AM    CO2 36 11/09/2022 10:33 AM    BUN 69 11/09/2022 10:33 AM    CREATININE 1.8 11/09/2022 10:33 AM    GLUCOSE 349 11/09/2022 10:33 AM    CALCIUM 9.2 11/09/2022 10:33 AM     Lab Results   Component Value Date/Time    WBC 6.5 11/09/2022 10:33 AM    HGB 10.1 11/09/2022 10:33 AM    HCT 35.8 11/09/2022 10:33 AM    MCV 92.5 11/09/2022 10:33 AM     11/09/2022 10:33 AM     I have personally reviewed the laboratory, cardiac diagnostic and radiographic testing as outlined above:    Assessment:     1. Acute exacerbation of congestive heart failure: Decompensated, second admission in less than a month, IV fluids balance is negative for 4 L since admission, will increase IV diuresis  2. Acute hypoxic respiratory failure: Secondary to above, improving  3. Atrial fibrillation with RVR: Rate is better controlled  4. Elevated troponin: Secondary to acute hypoxic ventilatory failure  5. CAD: Nonobstructive involving LAD and left circumflex artery  6. Tricuspid valve regurgitation: Mild  7. Pulmonary hypertension: Moderate  8. Hypertension: Controlled  9. Hyperlipidemia  10. Type 2 diabetes mellitus  11. History of beta-blocker and amiodarone induced bradycardia  12. History of digitoxicity  13. Acute kidney injury: Stable    Recommendations:     1. Will increase IV diuresis to 3 times daily  2. Metolazone 5 mg 1 by mouth once  3. Continue the rest of medications  4. Intake and output  5.   Basic metabolic panel and CBC in a.m.  6.  Increase ambulation as tolerated    Discussed with patient no family at bedside    Electronically signed by Daisy Garrison MD on 11/10/2022 at 3:22 PM  NOTE: This report was transcribed using voice recognition software.  Every effort was made to ensure accuracy; however, inadvertent computerized transcription errors may be present

## 2022-11-10 NOTE — PROGRESS NOTES
SPEECH LANGUAGE PATHOLOGY  DAILY PROGRESS NOTE        PATIENT NAME:  Dena Severin      :  1950          TODAY'S DATE:  11/10/2022 ROOM:  [address] [place] [address]    Patient seen for f/u for dysphagia mgmt. Patient received soft and bite sized consistency solids and nectar thick liquids via cup. Patient oral stage exhibited min oral stasis after 1st swl that cleared w/ 2nd swl and/or liquid wash, fair bolus formation;manipulation, adequate mastication time, and fair AP tongue propulsion. Patient pharyngeal stage exhibited no overt clinical indicators of pen/aspiration. Patient exhibited excellent utilization of small bites/sips, slow rate, alt solids/liquids and left heard turn when swallowing nectar thick liquids. Patient prefers to take medications whole in applesauce/pudding. Nursing present with medications and patient consumed medications whole in applesauce, one at a time,  w/ no overt s/s of pen/aspiration or dysphagia. Medications can be administered whole in applesauce/pudding, one at a time, or crushed in applesauce/pudding as able. Will cont w/ POC.        CPT code(s) 65013  dysphagia tx  Total minutes :  30 minutes    Hoemro Dumont M.S., [id number] [facility]  Speech-Language Pathologist  [phone]

## 2022-11-10 NOTE — PROGRESS NOTES
Patient wore BiPAP from 4805-6915 then was up to bedside commode and chair this morning. Tolerated well.

## 2022-11-10 NOTE — DISCHARGE SUMMARY
Discharge Summary    Date: 11/10/2022  Patient Name: Deanna Carpio    YOB: 1950     Age: 67 y.o. Admit Date: 10/5/2022  Discharge Date: 10/10/2022  Discharge Condition: Good    Admission Diagnosis  Sepsis [facility]) [A41.9]      Discharge Diagnosis  Principal Problem:    Sepsis ([address]  Resolved Problems:    * No resolved hospital problems. [facility] [place] [facility] Stay  Narrative of Hospital Course:  Admitted for CHF and COPD, cardiology and pulmonolgy consults doing. Responded well to treatments. Discharged back to nursing home in stable condition    Consultants:  IP CONSULT TO CRITICAL CARE  IP CONSULT TO INTERNAL MEDICINE  IP CONSULT TO Carlene S Umer Ryane TO DOSE VANCOMYCIN  IP CONSULT TO CARDIOLOGY  IP CONSULT TO HEART FAILURE NURSE/COORDINATOR    Surgeries/procedures Performed:  none     Treatments:    Cardiac Medications    Ace Inhibitor, Beta-Blocker and Furosemide    Discharge Plan/Disposition:  To [facility]/Incidental Findings Requiring Follow Up:    Patient Instructions:    Diet: Cardiac Diet    Activity:Activity as Tolerated  For number of days (if applicable): Other Instructions:    Provider Follow-Up:   No follow-ups on file. Significant Diagnostic Studies:    Recent Labs:  Admission on 10/05/2022, Discharged on 10/10/2022  No results displayed because visit has over 200 results. ------------    Radiology last 7 days:  CT CHEST WO CONTRAST    Result Date: 11/8/2022  Bilateral pleural effusions large on the right and small on the left with collapse of the right middle and lower lobe. Four-chamber cardiac enlargement with enlargement of the pulmonary arteries to suggest pulmonary arterial hypertension and small pericardial effusion. Findings raise a concern for possible failure. XR CHEST PORTABLE    Result Date: 11/6/2022  1. Small to moderate right pleural effusion 2. Right lung base atelectasis 3.  Cardiomegaly        Pending Labs     Order Current Status    Arterial Blood Gas, Respiratory Only Collected (10/06/22 0519)    Arterial Blood Gas, Respiratory Only Collected (10/07/22 0518)    Arterial Blood Gas, Respiratory Only Collected (10/08/22 6196)        Discharge Medications    Discharge Medication List as of 10/10/2022  2:29 PM    START taking these medications    furosemide (LASIX) 40 MG tablet  Take 1 tablet by mouth daily, Disp-60 tablet, R-3  Normal    sulfamethoxazole-trimethoprim (BACTRIM DS;SEPTRA DS) 800-160 MG per tablet  Take 1 tablet by mouth 2 times daily for 5 days, Disp-10 tablet, R-0  Normal          Discharge Medication List as of 10/10/2022  2:29 PM    CONTINUE these medications which have CHANGED    metoprolol succinate (TOPROL XL) 100 MG extended release tablet  Take 1 tablet by mouth in the morning and at bedtime, Disp-30 tablet, R-3  Normal    polyethylene glycol (GLYCOLAX) 17 g packet  Take 17 g by mouth daily as needed for Constipation, Disp-527 g, R-0  Normal    LORazepam (ATIVAN) 0.5 MG tablet  Take 1 tablet by mouth every 8 hours as needed for Anxiety for up to 30 days. , Disp-120 tablet, R-5  Print    apixaban (ELIQUIS) 5 MG TABS tablet  Take 1 tablet by mouth 2 times daily for 6 doses, Disp-60 tablet, R-5  Normal          Discharge Medication List as of 10/10/2022  2:29 PM    CONTINUE these medications which have NOT CHANGED    loperamide (IMODIUM) 2 MG capsule  Take 2 mg by mouth 4 times daily as needed for Diarrhea  Historical Med    zolpidem (AMBIEN) 5 MG tablet  Take 5 mg by mouth nightly.   Historical Med    OXYGEN  Inhale 2 L into the lungs continuous  Historical Med    docusate sodium (COLACE, DULCOLAX) 100 MG CAPS  Take 100 mg by mouth 2 times daily as needed for Constipation  DC to SNF    insulin glargine (LANTUS) 100 UNIT/ML injection vial  Inject 13 Units into the skin 2 times daily, Disp-10 mL, R-0  DC to SNF    atorvastatin (LIPITOR) 20 MG tablet  Take 20 mg by mouth nightly  Historical Med    benzoin compound solution  Apply 1 Applicatorful topically nightly Apply topically to right toe, Topical, NIGHTLY, Historical Med    ipratropium-albuterol (DUONEB) 0.5-2.5 (3) MG/3ML SOLN nebulizer solution  Inhale 1 vial into the lungs 4 times daily  Historical Med    miconazole (MICOTIN) 2 % powder  Apply 1 each topically 2 times daily Apply topically under breasts twice daily, Topical, 2 TIMES DAILY, Historical Med    insulin aspart (NOVOLOG) 100 UNIT/ML injection vial  Inject 0-10 Units into the skin 3 times daily (before meals) Blood Glucose 140 - 199 = 1 Unit  Blood Glucose 200 - 249 = 2 Units  Blood Glucose 250 - 299 = 4 Units  Blood Glucose 300 - 349 = 6 Units  Blood Glucose 350 - 399 = 8 Units  Blood Glucose 400+   = 10 Units  Historical Med    pantoprazole (PROTONIX) 40 MG tablet  Take 40 mg by mouth every morning  Historical Med    promethazine (PHENERGAN) 25 MG tablet  Take 25 mg by mouth every 6 hours as needed for Nausea  Historical Med    mirtazapine (REMERON) 15 MG tablet  Take 15 mg by mouth nightly  Historical Med    budesonide-formoterol (SYMBICORT) 160-4.5 MCG/ACT AERO  Inhale 2 puffs into the lungs 2 times daily  Historical Med    acetaminophen (TYLENOL) 325 MG tablet  Take 650 mg by mouth every 4 hours as needed for Pain or Fever  Historical Med    carbidopa-levodopa (SINEMET CR)  MG per extended release tablet  Take 2 tablets by mouth in the morning and 2 tablets at noon and 2 tablets in the evening. DC to SNF    rOPINIRole (REQUIP) 1 MG tablet  Take 1 tablet by mouth in the morning and 1 tablet at noon and 1 tablet before bedtime. , Disp-90 tablet, R-3  DC to SNF    sucralfate (CARAFATE) 1 GM tablet  Take 1 tablet by mouth in the morning and 1 tablet at noon and 1 tablet in the evening and 1 tablet before bedtime. , Disp-120 tablet, R-1  Normal    aspirin EC 81 MG EC tablet  Take 1 tablet by mouth daily, Disp-30 tablet, R-0  NO PRINT    montelukast (SINGULAIR) 10 MG tablet  Take 10 mg by mouth nightly  Historical Med          Discharge Medication List as of 10/10/2022  2:29 PM    STOP taking these medications    benzonatate (TESSALON) 100 MG capsule  Comments:  Reason for Stopping:    bumetanide (BUMEX) 1 MG tablet  Comments:  Reason for Stopping:    digoxin (LANOXIN) 125 MCG tablet  Comments:  Reason for Stopping:    insulin lispro (HUMALOG) 100 UNIT/ML SOLN injection vial  Comments:  Reason for Stopping:    metoprolol tartrate (LOPRESSOR) 25 MG tablet  Comments:  Reason for Stopping:    amiodarone (CORDARONE) 200 MG tablet  Comments:  Reason for Stopping:    budesonide (PULMICORT) 0.5 MG/2ML nebulizer suspension  Comments:  Reason for Stopping:    insulin glargine-yfgn (SEMGLEE-YFGN) 100 UNIT/ML injection vial  Comments:  Reason for Stopping:    QUEtiapine (SEROQUEL) 25 MG tablet  Comments:  Reason for Stopping:    divalproex (DEPAKOTE) 250 MG DR tablet  Comments:  Reason for Stopping:    ferrous sulfate (IRON 325) 325 (65 Fe) MG tablet  Comments:  Reason for Stopping:          Time Spent on Discharge:  40 minutes were spent in patient examination, evaluation, counseling as well as medication reconciliation, prescriptions for required medications, discharge plan, and follow up.     Electronically signed by Marco Segundo DO on 11/10/22 at 2:23 PM EST

## 2022-11-11 VITALS
DIASTOLIC BLOOD PRESSURE: 76 MMHG | SYSTOLIC BLOOD PRESSURE: 138 MMHG | OXYGEN SATURATION: 96 % | HEIGHT: 60 IN | WEIGHT: 232.56 LBS | TEMPERATURE: 97.8 F | RESPIRATION RATE: 20 BRPM | BODY MASS INDEX: 45.66 KG/M2 | HEART RATE: 98 BPM

## 2022-11-11 LAB
ANION GAP SERPL CALCULATED.3IONS-SCNC: 13 MMOL/L (ref 7–16)
BUN BLDV-MCNC: 67 MG/DL (ref 6–23)
CALCIUM SERPL-MCNC: 9.2 MG/DL (ref 8.6–10.2)
CHLORIDE BLD-SCNC: 92 MMOL/L (ref 98–107)
CO2: 36 MMOL/L (ref 22–29)
CREAT SERPL-MCNC: 1.6 MG/DL (ref 0.5–1)
GFR SERPL CREATININE-BSD FRML MDRD: 34 ML/MIN/1.73
GLUCOSE BLD-MCNC: 268 MG/DL (ref 74–99)
METER GLUCOSE: 128 MG/DL (ref 74–99)
METER GLUCOSE: 164 MG/DL (ref 74–99)
METER GLUCOSE: 278 MG/DL (ref 74–99)
METER GLUCOSE: 328 MG/DL (ref 74–99)
POTASSIUM SERPL-SCNC: 4.2 MMOL/L (ref 3.5–5)
SARS-COV-2, NAAT: NOT DETECTED
SODIUM BLD-SCNC: 141 MMOL/L (ref 132–146)

## 2022-11-11 PROCEDURE — 36415 COLL VENOUS BLD VENIPUNCTURE: CPT

## 2022-11-11 PROCEDURE — 99239 HOSP IP/OBS DSCHRG MGMT >30: CPT | Performed by: INTERNAL MEDICINE

## 2022-11-11 PROCEDURE — 94640 AIRWAY INHALATION TREATMENT: CPT

## 2022-11-11 PROCEDURE — 6370000000 HC RX 637 (ALT 250 FOR IP)

## 2022-11-11 PROCEDURE — 6370000000 HC RX 637 (ALT 250 FOR IP): Performed by: INTERNAL MEDICINE

## 2022-11-11 PROCEDURE — 87635 SARS-COV-2 COVID-19 AMP PRB: CPT

## 2022-11-11 PROCEDURE — 6370000000 HC RX 637 (ALT 250 FOR IP): Performed by: NURSE PRACTITIONER

## 2022-11-11 PROCEDURE — 94660 CPAP INITIATION&MGMT: CPT

## 2022-11-11 PROCEDURE — 2700000000 HC OXYGEN THERAPY PER DAY

## 2022-11-11 PROCEDURE — 2060000000 HC ICU INTERMEDIATE R&B

## 2022-11-11 PROCEDURE — 80048 BASIC METABOLIC PNL TOTAL CA: CPT

## 2022-11-11 PROCEDURE — 6370000000 HC RX 637 (ALT 250 FOR IP): Performed by: PHYSICIAN ASSISTANT

## 2022-11-11 PROCEDURE — 99232 SBSQ HOSP IP/OBS MODERATE 35: CPT | Performed by: INTERNAL MEDICINE

## 2022-11-11 PROCEDURE — 82962 GLUCOSE BLOOD TEST: CPT

## 2022-11-11 RX ORDER — DOXYCYCLINE HYCLATE 100 MG/1
100 CAPSULE ORAL EVERY 12 HOURS SCHEDULED
Status: DISCONTINUED | OUTPATIENT
Start: 2022-11-11 | End: 2022-11-12 | Stop reason: HOSPADM

## 2022-11-11 RX ORDER — FUROSEMIDE 40 MG/1
40 TABLET ORAL 3 TIMES DAILY
Status: DISCONTINUED | OUTPATIENT
Start: 2022-11-11 | End: 2022-11-12 | Stop reason: HOSPADM

## 2022-11-11 RX ORDER — POLYETHYLENE GLYCOL 3350 17 G/17G
17 POWDER, FOR SOLUTION ORAL DAILY PRN
Qty: 527 G | Refills: 1 | Status: SHIPPED | OUTPATIENT
Start: 2022-11-11 | End: 2022-12-11

## 2022-11-11 RX ORDER — DOXYCYCLINE HYCLATE 100 MG/1
100 CAPSULE ORAL EVERY 12 HOURS SCHEDULED
Qty: 2 CAPSULE | Refills: 0 | Status: SHIPPED | OUTPATIENT
Start: 2022-11-11 | End: 2022-11-12

## 2022-11-11 RX ADMIN — INSULIN GLARGINE 13 UNITS: 100 INJECTION, SOLUTION SUBCUTANEOUS at 20:27

## 2022-11-11 RX ADMIN — APIXABAN 5 MG: 5 TABLET, FILM COATED ORAL at 20:25

## 2022-11-11 RX ADMIN — ROPINIROLE 1 MG: 1 TABLET, FILM COATED ORAL at 20:25

## 2022-11-11 RX ADMIN — SUCRALFATE 1 G: 1 TABLET ORAL at 12:14

## 2022-11-11 RX ADMIN — AMIODARONE HYDROCHLORIDE 200 MG: 200 TABLET ORAL at 08:51

## 2022-11-11 RX ADMIN — CARBIDOPA AND LEVODOPA 2 TABLET: 25; 100 TABLET, EXTENDED RELEASE ORAL at 15:04

## 2022-11-11 RX ADMIN — ACETAMINOPHEN 650 MG: 325 TABLET ORAL at 20:25

## 2022-11-11 RX ADMIN — ROPINIROLE 1 MG: 1 TABLET, FILM COATED ORAL at 08:51

## 2022-11-11 RX ADMIN — FUROSEMIDE 40 MG: 40 TABLET ORAL at 20:24

## 2022-11-11 RX ADMIN — SUCRALFATE 1 G: 1 TABLET ORAL at 18:00

## 2022-11-11 RX ADMIN — ANTI-FUNGAL POWDER MICONAZOLE NITRATE TALC FREE 1 EACH: 1.42 POWDER TOPICAL at 08:54

## 2022-11-11 RX ADMIN — LORAZEPAM 0.5 MG: 0.5 TABLET ORAL at 20:24

## 2022-11-11 RX ADMIN — SUCRALFATE 1 G: 1 TABLET ORAL at 08:51

## 2022-11-11 RX ADMIN — PANTOPRAZOLE SODIUM 40 MG: 40 TABLET, DELAYED RELEASE ORAL at 08:51

## 2022-11-11 RX ADMIN — CARBIDOPA AND LEVODOPA 2 TABLET: 25; 100 TABLET, EXTENDED RELEASE ORAL at 08:53

## 2022-11-11 RX ADMIN — IPRATROPIUM BROMIDE AND ALBUTEROL SULFATE 1 AMPULE: .5; 2.5 SOLUTION RESPIRATORY (INHALATION) at 13:34

## 2022-11-11 RX ADMIN — DOXYCYCLINE HYCLATE 100 MG: 100 CAPSULE ORAL at 20:25

## 2022-11-11 RX ADMIN — CARBIDOPA AND LEVODOPA 2 TABLET: 25; 100 TABLET, EXTENDED RELEASE ORAL at 20:24

## 2022-11-11 RX ADMIN — APIXABAN 5 MG: 5 TABLET, FILM COATED ORAL at 08:51

## 2022-11-11 RX ADMIN — BUDESONIDE 500 MCG: 0.5 SUSPENSION RESPIRATORY (INHALATION) at 05:07

## 2022-11-11 RX ADMIN — DIVALPROEX SODIUM 250 MG: 250 TABLET, DELAYED RELEASE ORAL at 20:25

## 2022-11-11 RX ADMIN — PREDNISONE 30 MG: 20 TABLET ORAL at 10:55

## 2022-11-11 RX ADMIN — IPRATROPIUM BROMIDE AND ALBUTEROL SULFATE 1 AMPULE: .5; 2.5 SOLUTION RESPIRATORY (INHALATION) at 09:29

## 2022-11-11 RX ADMIN — FUROSEMIDE 40 MG: 40 TABLET ORAL at 10:54

## 2022-11-11 RX ADMIN — ACETAMINOPHEN 650 MG: 325 TABLET ORAL at 15:02

## 2022-11-11 RX ADMIN — ANTI-FUNGAL POWDER MICONAZOLE NITRATE TALC FREE 1 EACH: 1.42 POWDER TOPICAL at 20:24

## 2022-11-11 RX ADMIN — IPRATROPIUM BROMIDE AND ALBUTEROL SULFATE 1 AMPULE: .5; 2.5 SOLUTION RESPIRATORY (INHALATION) at 05:07

## 2022-11-11 RX ADMIN — METOLAZONE 5 MG: 2.5 TABLET ORAL at 08:50

## 2022-11-11 RX ADMIN — INSULIN LISPRO 6 UNITS: 100 INJECTION, SOLUTION INTRAVENOUS; SUBCUTANEOUS at 12:13

## 2022-11-11 RX ADMIN — SUCRALFATE 1 G: 1 TABLET ORAL at 20:25

## 2022-11-11 RX ADMIN — FUROSEMIDE 40 MG: 40 TABLET ORAL at 15:04

## 2022-11-11 RX ADMIN — PREDNISONE 30 MG: 20 TABLET ORAL at 20:24

## 2022-11-11 RX ADMIN — ACETAMINOPHEN 650 MG: 325 TABLET ORAL at 03:52

## 2022-11-11 RX ADMIN — INSULIN GLARGINE 13 UNITS: 100 INJECTION, SOLUTION SUBCUTANEOUS at 08:56

## 2022-11-11 RX ADMIN — ROPINIROLE 1 MG: 1 TABLET, FILM COATED ORAL at 15:04

## 2022-11-11 RX ADMIN — DIVALPROEX SODIUM 250 MG: 250 TABLET, DELAYED RELEASE ORAL at 08:53

## 2022-11-11 RX ADMIN — ATORVASTATIN CALCIUM 20 MG: 20 TABLET, FILM COATED ORAL at 20:24

## 2022-11-11 ASSESSMENT — PAIN DESCRIPTION - ORIENTATION
ORIENTATION: MID
ORIENTATION: MID;LOWER
ORIENTATION: MID
ORIENTATION: MID;LOWER

## 2022-11-11 ASSESSMENT — PAIN - FUNCTIONAL ASSESSMENT
PAIN_FUNCTIONAL_ASSESSMENT: ACTIVITIES ARE NOT PREVENTED
PAIN_FUNCTIONAL_ASSESSMENT: PREVENTS OR INTERFERES SOME ACTIVE ACTIVITIES AND ADLS
PAIN_FUNCTIONAL_ASSESSMENT: PREVENTS OR INTERFERES SOME ACTIVE ACTIVITIES AND ADLS

## 2022-11-11 ASSESSMENT — PAIN SCALES - GENERAL
PAINLEVEL_OUTOF10: 5
PAINLEVEL_OUTOF10: 5
PAINLEVEL_OUTOF10: 6
PAINLEVEL_OUTOF10: 5
PAINLEVEL_OUTOF10: 0

## 2022-11-11 ASSESSMENT — ENCOUNTER SYMPTOMS
SHORTNESS OF BREATH: 1
EYES NEGATIVE: 1
GASTROINTESTINAL NEGATIVE: 1
ALLERGIC/IMMUNOLOGIC NEGATIVE: 1
COUGH: 1

## 2022-11-11 ASSESSMENT — PAIN DESCRIPTION - LOCATION
LOCATION: BACK

## 2022-11-11 ASSESSMENT — PAIN DESCRIPTION - DESCRIPTORS
DESCRIPTORS: DULL
DESCRIPTORS: DULL
DESCRIPTORS: ACHING
DESCRIPTORS: ACHING

## 2022-11-11 ASSESSMENT — PAIN SCALES - WONG BAKER: WONGBAKER_NUMERICALRESPONSE: 0

## 2022-11-11 NOTE — DISCHARGE SUMMARY
[facility] Physician Discharge Summary       [facility]  Matteo Balderas  [phone]  Schedule an appointment as soon as possible for a visit  Appointment scheduled on 11/22/22 at 10am, Bring all pill bottles to appointment, Heart failure follow up and education    Abbi Freeman MD  [facility] Myron Newell  [phone]    Go on 11/16/2022  Appointment scheduled on 11/16/2022 at 2:30, hospital follow up, Bring all pill bottles to appointment      Activity level: Slowly increase as tolerated    Diet: ADULT DIET; Regular; Low Sodium (2 gm); Mildly Thick ([place])    Labs: None are pending at the discharge    Condition at discharge: Stable    Dispo: Return to facility    Patient ID:  Molina Tompkins  [id number]  32 y.o.  1950    Admit date: 11/6/2022    Discharge date and time:  11/11/2022  2:14 PM    Admission Diagnoses: Active Problems:    Acute decompensated heart failure (HCC)    Acute kidney injury ([address]    Acute respiratory failure with hypoxia and hypercapnia (HCC)    Acute on chronic diastolic heart failure (HCC)    Acute on chronic respiratory failure (HCC)    Elevated troponin    Hyperkalemia    Depression with anxiety    Hyperlipidemia    Obstructive sleep apnea syndrome    Atrial fibrillation with RVR ([address]  Resolved Problems:    * No resolved hospital problems. *      Discharge Diagnoses: Active Problems:    Acute decompensated heart failure (HCC)    Acute kidney injury ([address]    Acute respiratory failure with hypoxia and hypercapnia (HCC)    Acute on chronic diastolic heart failure (HCC)    Acute on chronic respiratory failure (HCC)    Elevated troponin    Hyperkalemia    Depression with anxiety    Hyperlipidemia    Obstructive sleep apnea syndrome    Atrial fibrillation with RVR ([address]  Resolved Problems:    * No resolved hospital problems.  *      Consults:  IP CONSULT TO INTERNAL MEDICINE  IP CONSULT TO PULMONOLOGY  IP CONSULT TO CARDIOLOGY  IP CONSULT TO DIETITIAN  IP CONSULT TO HEART FAILURE NURSE/COORDINATOR    Procedures: None significant except if described in hospital course. Hospital Course:   History of present illness from History and Physical:  The patient is a 67 y.o. female with significant past medical history of CHF, A. Fib, Hypertension Essential, Hx of Breast Ca who presents with weakness and lower extremity swelling. She noted that for the last 2 days she is feeling more tired this has progressively gotten worse started today. She was admitted to increased lower extremity edema. She denied fever chills no nausea no vomiting no cough. In the ER she was found to be lethargic. ABG was suggestive of hypercapnia. Her pH was 7.24, PCO2 was 80. She was placed on BiPAP and admitted for further evaluation. In the ER she was also noted to have irregular heartbeat with heart rate persistently above 100 with high of 130s and low of 104. Blood pressure was also low. She is being admitted for further management. EKG in the ER shows A. fib with RVR. Due to fluid overload and uncontrolled atrial fibrillation she was given loading dose of digoxin and additional 2 doses of every 6 hours after the initial dose. Decompensated acute on chronic respiratory failure: Volume overload. saw pulmonary who gave her doxy which will finish tomorrow  Acute decompensated diastolic heart failure. Initially with hypotension therefore diuretics was not given it initially. Cardiology notes: second admission in less than a month. Diuretic therapy continues to be adjusted  11/10 by cardio. D/c lasix > admission lasix. Needs close f/u for this as outpatient  A. fib rapid ventricular response was on amiodarone at home initially held here. Cardio adjusted digoxin. Cardiology adjusting anti arrhythmics. H/o beta-blocker and amiodarone induced bradycardia.  History of digitoxicity  Obstructive sleep apnea BiPAP ordered & recently compliant. 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 Obesity needs lifestyle modification follow-up as an outpatient  COPD continue current care not in exacerbation. There is also moderate pulmonary htn  Type II diabetes sliding scale diabetic diet current insulin regimen  Hyperlipidemia on statin  Skin fold rash nystatin powder  Insomnia Ambien initially on hold due to hypercapnia and I will continue to hold it due to dementia. However she did get ativan 10/9 pm to help with phobia of cpap. One may consider this ging forard. She seems to vaguely remember that I have told her to expect it to get easier and less phobic fi she sticks with it for a few weeks  Dementia possibly due to her known Parkinsons continue Sinemet       NOTE: This report was transcribed using voice recognition software      Discharge Exam:  Vitals:    11/11/22 0236 11/11/22 0430 11/11/22 0545 11/11/22 0749   BP:  134/77  (!) 151/97   Pulse:  77  87   Resp:  18  20   Temp:  97.8 °F (36.6 °C)  97.5 °F (36.4 °C)   TempSrc:  Axillary  Oral   SpO2:  99% 97% 93%   Weight: 232 lb 9 oz (105.5 kg)      Height:           No acute distress moist membranes   Normocephalic, without obvious abnormality, atraumatic, external ears without lesions,   Neck supple no cervical lymphadenopathy  Heart has a regular rate and rhythm no murmur  Lungs are clear to auscultation bilaterally with equal movements. Abdomen is soft nontender nondistended no rebound or guarding. No  significant peripheral edema good peripheral perfusion. No significant rashes or new skin lesions. No new focality on neuro exam which is overall grossly intact. Affect and mood seem to be appropriate     I/O last 3 completed shifts: In: 36 [P.O.:820]  Out: 2550 [Urine:2550]  No intake/output data recorded.       LABS:  Recent Labs     11/09/22  1033 11/11/22  1115    141   K 4.8 4.2   CL 91* 92*   CO2 36* 36*   BUN 69* 67*   CREATININE 1.8* 1.6*   GLUCOSE 349* 268*   CALCIUM 9.2 9.2       Recent Labs     11/09/22  1033   WBC 6.5   RBC 3.87   HGB 10.1*   HCT 35.8   MCV 92.5   MCH 26.1   MCHC 28.2*   RDW 14.8      MPV 10.6       No results for input(s): POCGLU in the last 72 hours. Imaging:  CT CHEST WO CONTRAST    Result Date: 11/8/2022  EXAMINATION: CT OF THE CHEST WITHOUT CONTRAST 11/8/2022 10:10 am TECHNIQUE: CT of the chest was performed without the administration of intravenous contrast. Multiplanar reformatted images are provided for review. Automated exposure control, iterative reconstruction, and/or weight based adjustment of the mA/kV was utilized to reduce the radiation dose to as low as reasonably achievable. COMPARISON: None. HISTORY: ORDERING SYSTEM PROVIDED HISTORY: SHORTNESS OF BREATH TECHNOLOGIST PROVIDED HISTORY: Reason for exam:->effusion right FINDINGS: Mediastinum: Thyroid is homogeneous in appearance. There is no mediastinal mass. Coronary artery calcification identified with cardiac chamber enlargement. Small pericardial effusion. There is some reactive lymph nodes in the mediastinum. There is hilar lymph nodes borderline in size bilaterally suggesting reactive lymph nodes. Enlargement the pulmonary artery to suggest pulmonary arterial hypertension. Lungs/pleura: There is collapse identified of the right middle and lower lobe. Aeration of the right upper lung field. Moderate size right pleural effusion. There is a small left pleural effusion with minimal atelectatic changes seen left lung. The left lung parenchyma is grossly clear. Upper Abdomen: Visualized upper abdomen reveals some fluid seen surrounding the liver. Extensive vascular calcifications seen within the mesenteric vessels. The gallbladder is been surgically removed. Soft Tissues/Bones: There are degenerative changes seen within the spine. There is no acute chest wall abnormality.      Bilateral pleural effusions large on the right and small on the left with collapse of the right middle and lower lobe. Four-chamber cardiac enlargement with enlargement of the pulmonary arteries to suggest pulmonary arterial hypertension and small pericardial effusion. Findings raise a concern for possible failure.          Patient Instructions:   Current Discharge Medication List        START taking these medications    Details   doxycycline hyclate (VIBRAMYCIN) 100 MG capsule Take 1 capsule by mouth every 12 hours for 2 doses  Qty: 2 capsule, Refills: 0           CONTINUE these medications which have CHANGED    Details   polyethylene glycol (GLYCOLAX) 17 g packet Take 17 g by mouth daily as needed for Constipation  Qty: 527 g, Refills: 1           CONTINUE these medications which have NOT CHANGED    Details   metoprolol succinate (TOPROL XL) 100 MG extended release tablet Take 1 tablet by mouth in the morning and at bedtime  Qty: 30 tablet, Refills: 3      furosemide (LASIX) 40 MG tablet Take 1 tablet by mouth daily  Qty: 60 tablet, Refills: 3      loperamide (IMODIUM) 2 MG capsule Take 2 mg by mouth 4 times daily as needed for Diarrhea      OXYGEN Inhale 2 L into the lungs continuous      docusate sodium (COLACE, DULCOLAX) 100 MG CAPS Take 100 mg by mouth 2 times daily as needed for Constipation      insulin glargine (LANTUS) 100 UNIT/ML injection vial Inject 13 Units into the skin 2 times daily  Qty: 10 mL, Refills: 0      atorvastatin (LIPITOR) 20 MG tablet Take 20 mg by mouth nightly      benzoin compound solution Apply 1 Applicatorful topically nightly Apply topically to right toe      ipratropium-albuterol (DUONEB) 0.5-2.5 (3) MG/3ML SOLN nebulizer solution Inhale 1 vial into the lungs 4 times daily      miconazole (MICOTIN) 2 % powder Apply 1 each topically 2 times daily Apply topically under breasts twice daily      pantoprazole (PROTONIX) 40 MG tablet Take 40 mg by mouth every morning      mirtazapine (REMERON) 15 MG tablet Take 15 mg by mouth nightly budesonide-formoterol (SYMBICORT) 160-4.5 MCG/ACT AERO Inhale 2 puffs into the lungs 2 times daily      acetaminophen (TYLENOL) 325 MG tablet Take 650 mg by mouth every 4 hours as needed for Pain or Fever      carbidopa-levodopa (SINEMET CR)  MG per extended release tablet Take 2 tablets by mouth in the morning and 2 tablets at noon and 2 tablets in the evening. rOPINIRole (REQUIP) 1 MG tablet Take 1 tablet by mouth in the morning and 1 tablet at noon and 1 tablet before bedtime. Qty: 90 tablet, Refills: 3      sucralfate (CARAFATE) 1 GM tablet Take 1 tablet by mouth in the morning and 1 tablet at noon and 1 tablet in the evening and 1 tablet before bedtime.   Qty: 120 tablet, Refills: 1      aspirin EC 81 MG EC tablet Take 1 tablet by mouth daily  Qty: 30 tablet, Refills: 0      montelukast (SINGULAIR) 10 MG tablet Take 10 mg by mouth nightly           STOP taking these medications       glucose (GLUTOSE) 40 % GEL Comments:   Reason for Stopping:         glucagon, rDNA, 1 MG injection Comments:   Reason for Stopping:         zolpidem (AMBIEN) 5 MG tablet Comments:   Reason for Stopping:         insulin aspart (NOVOLOG) 100 UNIT/ML injection vial Comments:   Reason for Stopping:         promethazine (PHENERGAN) 25 MG tablet Comments:   Reason for Stopping:         metoprolol tartrate (LOPRESSOR) 25 MG tablet Comments:   Reason for Stopping:         amiodarone (CORDARONE) 200 MG tablet Comments:   Reason for Stopping:         budesonide (PULMICORT) 0.5 MG/2ML nebulizer suspension Comments:   Reason for Stopping:         insulin glargine-yfgn (SEMGLEE-YFGN) 100 UNIT/ML injection vial Comments:   Reason for Stopping:         QUEtiapine (SEROQUEL) 25 MG tablet Comments:   Reason for Stopping:         divalproex (DEPAKOTE) 250 MG DR tablet Comments:   Reason for Stopping:         ferrous sulfate (IRON 325) 325 (65 Fe) MG tablet Comments:   Reason for Stopping:                 Note that more than 30 minutes was spent in preparing discharge papers, discussing discharge with patient, medication review, etc.    NOTE: This report was transcribed using voice recognition software. Every effort was made to ensure accuracy; however, inadvertent computerized transcription errors may be present.      Signed:  Electronically signed by Reed Champion MD on 11/11/2022 at 2:14 PM

## 2022-11-11 NOTE — PROGRESS NOTES
Pulmonary/Critical Care Progress Note    We are following patient for resp failure with hypoxia and hypercapnea    ISSUES:    Decompensated acute on chronic respiratory failure  Recurrent right effusion   Prior right chest tube in July  CT scan confirms recurrent R >> L sided Pleural Effusion with associated lung compression   Fluid transudative in past c/w CHF  Continue to treat CHF with diuresis   No indication for thoracentesis    Acute on Chronic Hypercapneic Respiratory Failure   COPD and SERENA  BiPAP QHS   Was not using before  ABGs much improved  Encouraged complaince    Atrial fibrillation  Rate controlled  Being followed by cardiology    11/10/22 Overall feeling better, walked around  Decreasing O2 requirement  Encouraged use of BiPAP  Does well with Ativan QHS to help with claustrophobia  Diuresing    11/11/22  Ongoing improvement  On BiPAP QHS - tolerating  Preparing for discharge       Continue present management     Counseled  All questions answered   ______________________________________________    SUBJECTIVE:  67year old with SERENA, obesity,COPD with chronic chf and atrial fib. Progressively worse coming to the hospital and with ABG deragements placed on bipapp. Improvement thereafter and transitions to nc to eat and drink. PMHx: Reviewed by chart, dm, htn, hypelipd, copd, CKD, as well parkinsons disease and CAD. Plus above.     MEDICATIONS:   metOLazone  5 mg Oral Daily    furosemide  40 mg IntraVENous TID    insulin lispro  0-4 Units SubCUTAneous Nightly    insulin lispro  0-8 Units SubCUTAneous TID     amiodarone  200 mg Oral Daily    apixaban  5 mg Oral BID    doxycycline (VIBRAMYCIN) IV  100 mg IntraVENous Q12H    methylPREDNISolone  30 mg IntraVENous 3 times per day    atorvastatin  20 mg Oral Nightly    budesonide  0.5 mg Nebulization BID    carbidopa-levodopa  2 tablet Oral TID    divalproex  250 mg Oral BID    insulin glargine  13 Units SubCUTAneous BID    miconazole  1 each Topical BID pantoprazole  40 mg Oral QAM    rOPINIRole  1 mg Oral TID    sucralfate  1 g Oral 4x Daily    sodium chloride flush  5-40 mL IntraVENous 2 times per day    ipratropium-albuterol  1 ampule Inhalation Q4H WA      dextrose      sodium chloride       LORazepam, glucose, dextrose bolus **OR** dextrose bolus, glucagon (rDNA), dextrose, acetaminophen, docusate sodium, sodium chloride flush, sodium chloride, ondansetron **OR** ondansetron, polyethylene glycol, acetaminophen **OR** acetaminophen    Review of Systems   Constitutional:  Positive for fatigue. HENT: Negative. Eyes: Negative. Respiratory:  Positive for cough and shortness of breath. Cardiovascular: Negative. Gastrointestinal: Negative. Endocrine: Negative. Genitourinary: Negative. Musculoskeletal: Negative. Skin: Negative. Allergic/Immunologic: Negative. Neurological:  Positive for weakness. Hematological: Negative. Psychiatric/Behavioral: Negative.        OBJECTIVE:  Vitals:    11/11/22 0749   BP: (!) 151/97   Pulse: 87   Resp: 20   Temp: 97.5 °F (36.4 °C)   SpO2: 93%     FiO2 : 40 %  O2 Flow Rate (L/min): 2 L/min  O2 Device: Nasal cannula    PHYSICAL EXAM:  Constitutional: Alert and talkative obese woman  Skin: no breakdown and no ischemia  HEENT: Normocephalic and supple  Neck: No  jvd or bruits  Cardiovascular: Rr irreg and no murmer  Respiratory: deminished and few late wheezes  Gastrointestinal: soft and bowel sounds intact  Genitourinary: deferred  Extremities: no dvt of ischemia  Neurological: intact grossly talkative and interactive   Psychological: appropriate      LABS:  WBC   Date Value Ref Range Status   11/09/2022 6.5 4.5 - 11.5 E9/L Final   11/08/2022 4.5 4.5 - 11.5 E9/L Final   11/07/2022 7.6 4.5 - 11.5 E9/L Final     Hemoglobin   Date Value Ref Range Status   11/09/2022 10.1 (L) 11.5 - 15.5 g/dL Final   11/08/2022 8.5 (L) 11.5 - 15.5 g/dL Final   11/07/2022 9.3 (L) 11.5 - 15.5 g/dL Final     Hematocrit   Date Value Ref Range Status   11/09/2022 35.8 34.0 - 48.0 % Final   11/08/2022 29.8 (L) 34.0 - 48.0 % Final   11/07/2022 34.2 34.0 - 48.0 % Final     MCV   Date Value Ref Range Status   11/09/2022 92.5 80.0 - 99.9 fL Final   11/08/2022 92.3 80.0 - 99.9 fL Final   11/07/2022 93.2 80.0 - 99.9 fL Final     Platelets   Date Value Ref Range Status   11/09/2022 164 130 - 450 E9/L Final   11/08/2022 143 130 - 450 E9/L Final   11/07/2022 193 130 - 450 E9/L Final     Sodium   Date Value Ref Range Status   11/09/2022 138 132 - 146 mmol/L Final   11/08/2022 137 132 - 146 mmol/L Final   11/07/2022 139 132 - 146 mmol/L Final     Potassium   Date Value Ref Range Status   11/08/2022 5.2 (H) 3.5 - 5.0 mmol/L Final   11/06/2022 5.1 (H) 3.5 - 5.0 mmol/L Final   10/16/2022 4.3 3.5 - 5.0 mmol/L Final     Potassium reflex Magnesium   Date Value Ref Range Status   11/09/2022 4.8 3.5 - 5.0 mmol/L Final   11/07/2022 5.5 (H) 3.5 - 5.0 mmol/L Final   10/13/2022 4.2 3.5 - 5.0 mmol/L Final     Chloride   Date Value Ref Range Status   11/09/2022 91 (L) 98 - 107 mmol/L Final   11/08/2022 92 (L) 98 - 107 mmol/L Final   11/07/2022 96 (L) 98 - 107 mmol/L Final     CO2   Date Value Ref Range Status   11/09/2022 36 (H) 22 - 29 mmol/L Final   11/08/2022 34 (H) 22 - 29 mmol/L Final   11/07/2022 35 (H) 22 - 29 mmol/L Final     BUN   Date Value Ref Range Status   11/09/2022 69 (H) 6 - 23 mg/dL Final   11/08/2022 67 (H) 6 - 23 mg/dL Final   11/07/2022 65 (H) 6 - 23 mg/dL Final     Creatinine   Date Value Ref Range Status   11/09/2022 1.8 (H) 0.5 - 1.0 mg/dL Final   11/08/2022 1.9 (H) 0.5 - 1.0 mg/dL Final   11/07/2022 1.9 (H) 0.5 - 1.0 mg/dL Final     Glucose   Date Value Ref Range Status   11/09/2022 349 (H) 74 - 99 mg/dL Final   11/08/2022 252 (H) 74 - 99 mg/dL Final   11/07/2022 181 (H) 74 - 99 mg/dL Final     Calcium   Date Value Ref Range Status   11/09/2022 9.2 8.6 - 10.2 mg/dL Final   11/08/2022 8.5 (L) 8.6 - 10.2 mg/dL Final   11/07/2022 8.8 8.6 - 10.2 mg/dL Final     Total Protein   Date Value Ref Range Status   11/07/2022 5.3 (L) 6.4 - 8.3 g/dL Final   10/14/2022 6.3 (L) 6.4 - 8.3 g/dL Final   10/13/2022 5.6 (L) 6.4 - 8.3 g/dL Final     Albumin   Date Value Ref Range Status   11/07/2022 3.3 (L) 3.5 - 5.2 g/dL Final   10/14/2022 3.5 3.5 - 5.2 g/dL Final   10/13/2022 3.1 (L) 3.5 - 5.2 g/dL Final     Total Bilirubin   Date Value Ref Range Status   11/07/2022 0.4 0.0 - 1.2 mg/dL Final   10/14/2022 0.3 0.0 - 1.2 mg/dL Final   10/13/2022 0.3 0.0 - 1.2 mg/dL Final     Alkaline Phosphatase   Date Value Ref Range Status   11/07/2022 74 35 - 104 U/L Final   10/14/2022 98 35 - 104 U/L Final   10/13/2022 87 35 - 104 U/L Final     AST   Date Value Ref Range Status   11/07/2022 7 0 - 31 U/L Final   10/14/2022 7 0 - 31 U/L Final   10/13/2022 12 0 - 31 U/L Final     Comment:     Specimen is slightly Hemolyzed. Result may be artificially increased. ALT   Date Value Ref Range Status   11/07/2022 <5 0 - 32 U/L Final   10/14/2022 <5 0 - 32 U/L Final   10/13/2022 <5 0 - 32 U/L Final     Est, Glom Filt Rate   Date Value Ref Range Status   11/09/2022 29 >=60 mL/min/1.73 Final     Comment:     Pediatric calculator link  Ml.at. [email or web address]/gfr_calculatorped  Effective Oct 3, 2022  These results are not intended for use in patients  <25years of age. eGFR results are calculated without  a race factor using the 2021 CKD-EPI equation. Careful  clinical correlation is recommended, particularly when  comparing to results calculated using previous equations. The CKD-EPI equation is less accurate in patients with  extremes of muscle mass, extra-renal metabolism of  creatinine, excessive creatinine ingestion, or following  therapy that affects renal tubular secretion. 11/08/2022 28 >=60 mL/min/1.73 Final     Comment:     Pediatric calculator link  Ml[employer]at. [email or web address]/gfr_calculatorped  Effective Oct 3, 2022  These results are not intended for use in patients  <25years of age. eGFR results are calculated without  a race factor using the 2021 CKD-EPI equation. Careful  clinical correlation is recommended, particularly when  comparing to results calculated using previous equations. The CKD-EPI equation is less accurate in patients with  extremes of muscle mass, extra-renal metabolism of  creatinine, excessive creatinine ingestion, or following  therapy that affects renal tubular secretion. 11/07/2022 28 >=60 mL/min/1.73 Final     Comment:     Pediatric calculator link  Ml.at. [email or web address]/gfr_calculatorped  Effective Oct 3, 2022  These results are not intended for use in patients  <25years of age. eGFR results are calculated without  a race factor using the 2021 CKD-EPI equation. Careful  clinical correlation is recommended, particularly when  comparing to results calculated using previous equations. The CKD-EPI equation is less accurate in patients with  extremes of muscle mass, extra-renal metabolism of  creatinine, excessive creatinine ingestion, or following  therapy that affects renal tubular secretion. GFR    Date Value Ref Range Status   10/16/2022 >60  Final   10/14/2022 59  Final   10/13/2022 >60  Final     Magnesium   Date Value Ref Range Status   10/14/2022 2.1 1.6 - 2.6 mg/dL Final   10/08/2022 1.9 1.6 - 2.6 mg/dL Final   10/07/2022 1.8 1.6 - 2.6 mg/dL Final     Phosphorus   Date Value Ref Range Status   10/08/2022 3.0 2.5 - 4.5 mg/dL Final   10/07/2022 2.8 2.5 - 4.5 mg/dL Final   10/06/2022 2.6 2.5 - 4.5 mg/dL Final     No results for input(s): PH, PO2, PCO2, HCO3, BE, O2SAT in the last 72 hours. RADIOLOGY:  CT CHEST WO CONTRAST   Final Result   Bilateral pleural effusions large on the right and small on the left with   collapse of the right middle and lower lobe.   Four-chamber cardiac   enlargement with enlargement of the pulmonary arteries to suggest pulmonary arterial hypertension and small pericardial effusion. Findings raise a   concern for possible failure. XR CHEST PORTABLE   Final Result   1. Small to moderate right pleural effusion   2. Right lung base atelectasis   3. Cardiomegaly                 PROBLEM LIST:  Active Problems:    Acute decompensated heart failure (HCC)    Acute kidney injury ([address]    Acute respiratory failure with hypoxia and hypercapnia (HCC)    Acute on chronic diastolic heart failure (HCC)    Acute on chronic respiratory failure (HCC)    Elevated troponin    Hyperkalemia    Depression with anxiety    Hyperlipidemia    Obstructive sleep apnea syndrome    Atrial fibrillation with RVR ([address]  Resolved Problems:    * No resolved hospital problems.  Joe Bunn MD  Pulmonary & Critical Care  11/11/2022 9:16 AM

## 2022-11-11 NOTE — PROGRESS NOTES
Physical Therapy    [id number]    Patient unavailable for physical therapy treatment due to patient c/o back pain. Doc Hollie Manjarrez  \[facility]\""  LIC # [id number]

## 2022-11-11 NOTE — CONSULTS
CHF NURSE NAVIGATOR CONSULT NOTE:      Patient currently admitted with diagnosis of Diastolic heart failure. Patient was awake and alert sitting on the edge of the bed during the consultation. She was engaged and asked appropriate questions throughout the education session. She is agreeable to continuing to monitor for HF symptoms, daily wts and following a low sodium diet. Scheduling with [facility] and the CHF clinic Yes. Future Appointments   Date Time Provider Prateek Nassar   11/16/2022  2:30 PM Conrado Deluca MD [facility]   11/22/2022 10:00 AM [facility] CHF ROOM 1 [facility]       Barriers to Care:  Contributing risk factors for Heart Failure are identified as overwhelmed by complexity of regimen, and multiple comorbidities. She is on her 2nd 30 day readmit in the past month. She was seen in consult for admissions on 10/5/22 and 10/12/22. Pt states that she has been weighed daily on most days. She states she is frustrated that the facility does not follow her prescribed diet. Reviewed the HF zones and symptoms to monitor. Pt agrees to remind staff at facility that she needs a daily weight and notify them of any symptoms of worsening HF. HF [place] d/c instructions on AVS.     The patient is ordered:  Diet: ADULT DIET; Regular;  Low Sodium (2 gm); Mildly Thick (Nectar)   Sodium controlled diet Yes  Fluid restriction daily ordered (fluid restriction recommended if patient is hyponatremic and/or diuretic is initiated or increased) No  FR:   Daily Weights: Patient Vitals for the past 96 hrs (Last 3 readings):   Weight   11/11/22 0236 232 lb 9 oz (105.5 kg)   11/08/22 2025 235 lb (106.6 kg)   11/08/22 0056 240 lb 9 oz (109.1 kg)     I/O:   Intake/Output Summary (Last 24 hours) at 11/11/2022 1615  Last data filed at 11/11/2022 0554  Gross per 24 hour   Intake 100 ml   Output 1650 ml   Net -1550 ml              We reviewed the introduction to Heart Failure, the HF zones, signs and symptoms to report on day 1 of onset, medications, medication compliance, the importance of obtaining daily weights, following a low sodium diet, reading food labels for the sodium content, keeping physician appointments, and smoking cessation. We discussed writing / tracking daily weights on a calendar / log because a 5 pound gain in 1 week can sneak up if you are not tracking it. I advised patient they can reduce the risk for Heart Failure exacerbations by modifying / controlling the risk factors. We discussed self-managed care which includes the following:  to take medications as prescribed, report any intolerable side effects of medications to the cardiologist / doctor, do not just stop taking the medication; follow a cardiac heart healthy / low sodium diet; weigh yourself daily, exercise regularly- per doctor recommendation and not to smoke or use an excess amount of alcohol. We discussed calling the cardiologist / doctor with a weight gain of 3 pounds in one day or a total of 5 pounds or more in one week. Also, if you should have a significant weight loss of 3# or more in one day to call the doctor, they may need to decrease or hold the diuretic dose. On days you feels nauseated and not eating / drinking, having emesis or diarrhea,  informed to call the cardiologist  / doctor, they may need to decrease or hold diuretic to avoid dehydration. I stressed the importance of informing their cardiologist the first day of onset of any of the signs and symptoms in the \"Yellow Zone\" of the HF Zones. Patient verbalizes understanding. Greater than 30 minutes was spent educating patient.         Instructed  to call 911 if you have any of the following symptoms:       Struggling to breathe unrelieved with rest,     Having chest pain     Have confusion or cant think clearly    Loren Adler MSN, RN  Heart Failure Navigator         CONGESTIVE HEART FAILURE (CHF) AHA GUIDELINES  (Must be completed for Primary Diagnosis CHF or History of CHF)    Discharge Plan:  I placed the Heart Failure Home Instructions in patient's discharge instructions. Per Heart Failure GWTG, the patient should have a follow-up appointment made within 7 days of discharge.     New Diagnosis No    ECHO Results most recent: 7/7/22 EF 70-75%  Lab Results   Component Value Date    LVEF 73 07/07/2022                                        Social History     Tobacco Use   Smoking Status Never   Smokeless Tobacco Never        Immunization History   Administered Date(s) Administered    COVID-19, PFIZER GRAY top, DO NOT Dilute, (age 15 y+), IM, 30 mcg/0.3 mL 05/24/2022    Influenza Vaccine, unspecified formulation 10/15/2014    Influenza Virus Vaccine 10/31/2008, 10/05/2011    Influenza, High Dose (Fluzone 65 yrs and older) 09/22/2015, 11/21/2016, 09/19/2017, 09/25/2019    Influenza, Triv, inactivated, subunit, adjuvanted, IM (Fluad 65 yrs and older) 01/03/2019    Pneumococcal Conjugate 13-valent ([id number]) 05/27/2016    Pneumococcal Polysaccharide ([id number]) 12/21/2012, 01/29/2018    Td, unspecified formulation 03/11/2014    Tdap (Boostrix, Adacel) 09/30/2015    Zoster Live (Zostavax) 06/25/2013    Zoster Recombinant (Shingrix) 12/10/2019          Angiotensin-Converting-Enzyme (ACE) inhibitor ordered:  [] Yes  [x] No (specify contraindication):    [] Contraindicated  [] Hypotensive patient who was at immediate risk of cardiogenic shock  [] Hospitalized patient who experienced marked azotemia  [] Other Contraindications  [x] Not Eligible  [] Not Tolerant  [] Patient Reason  [] System Reason  [] Other Reason    Angiotensin II receptor blockers (ARB) ordered:  [] Yes  [] No (specify contraindication):    [] Contraindicated  [] Hypotensive patient who was at immediate risk of cardiogenic shock  [] Hospitalized patient who experienced marked azotemia  [] Other Contraindications  [x] Not Eligible    ARNI - Angiotensin Receptor Neprilysin Inhibitor ordered:  [] Yes  [x] No (specify contraindication):    [] ACE inhibitor use within the prior 36 hours  [] Allergy  [] Hyperkalemia  [] Hypotension  [] Renal dysfunction defined as creatinine > 2.5 mg/dL in men or > 2.0 mg/dL in women  [] Other Contraindications  [x] Not Eligible  [] Not Tolerant  [] Patient Reason  []System Reason  []Other Reason      Beta Blocker ordered:    [] Yes  [x] No (specify contraindication):    [] Contraindicated  [] Asthma  [] Fluid Overload  [] Low Blood Pressure  [] Patient recently treated with an intravenous positive inotropic agent  [x] Other Contraindications   [] Not Eligible  [] Not Tolerant  [] Patient Reason  [] System Reason    SGLT2 Inhibitor ordered:  [] Yes  [x] No (specify contraindication):    [] Contraindicated  [] Patient currently on dialysis  [] Ketoacidosis  [] Known hypersensitivity to the medication  [] Type I diabetes (not approved for use in patients with Type I diabetes due to increased risk of ketoacidosis)  [] Other Contraindications  [] Not Eligible  [] Not Tolerant  [] Patient Reason  [] System Reason  [x] Other Reason    Aldosterone Antagonist ordered:  [] Yes  [x] No (specify contraindication):    [] Contraindicated  [] Allergy due to aldosterone receptor antagonist  [] Hyperkalemia  [] Renal dysfunction defined as creatinine >2.5 mg/dL in men or >2.0 mg/dL in women.   [] Other contraindications  [x] Not Eligible  [] Not Tolerant  [] Patient Reason  [] System Reason  [] Other Reason    As noted by Dr. Riggins Fairly   History of beta-blocker and amiodarone induced bradycardia

## 2022-11-11 NOTE — CARE COORDINATION
11/10/2022 1100 CM met with pt at the bedside for transition of care needs at d/c.  Per cate Camargo at [address] [facility] they plan on pt returning and  [address]. Per Mary Jo esposito they have Bipap at [facility] and staff knows how to use it and that pt tends to refuse wearing it. Pt is still using Bipap 12/6 with 50% FIO2 at night will need to be on 40% or less for the NH. Will NEED SIGNED [place], and a RAPID COVID the day of d/c. CM will follow.  Electronically signed by Kaitlin Oshea RN on 11/10/2022 at 11:23 AM
11/11/2022 1300 CM met with pt at the bedside for transition of care needs at d/c.  Per cate Camargo at [address] [facility] they plan on pt returning and  [address]. Per Mary Jo esposito they have Bipap at [facility] and staff knows how to use it and that pt tends to refuse wearing it. Pt is still using Bipap 12/6 with 40% FIO2 at night and per pulmonology could be 30% since only wearing 2 liters during the day. Will NEED SIGNED [place], and a RAPID COVID the day of d/c. CM will follow.  Electronically signed by Barrett Dominguez RN on 11/11/2022 at 1:08 PM
11/11/2022 1536 Pt is being discharged back to [facility] and per [facility] they can take her back she is a long term pt and a bed hold. NO PRECERT NEEDED. D[place] is signed, Bettie Quinteros is sent. [facility] is scheduled for 2130 and CM requested that they out source the trip, [facility] at [facility] said he would try. The ambulance form was completed and placed with the paperwork from the facility. CM left VM for pt's children that she's being discharged tonight. CM will follow.  Electronically signed by Kingsley Gillis RN on 11/11/2022 at 3:45 PM
11/7/2022 1230 CM met with pt at the bedside for transition of care needs at d/c.  Per liafarzaneh Camargo at [address] [facility] they plan on pt returning and  [address]. Pt states she is willing to return of they provide her a Bipap and staff knows how to use it. Per Mary Jo liaison they have a bipap for her and staff knows how to use it. Will NEED SIGNED [place], and a RAPID COVID the day of d/c. CM will follow.   Electronically signed by Evelyn Moise RN on 11/7/2022 at 12:48 PM
11/8/2022 1020 CM met with pt at the bedside for transition of care needs at d/c.  Per cate Camargo at [address] [facility] they plan on pt returning and  [address]. Per Mary Jo esposito they have Bipap at [facility] and staff knows how to use it and that pt tends to refuse wearing it. Will NEED SIGNED [place], and a RAPID COVID the day of d/c. CM will follow.  Electronically signed by Apurva Manuel RN on 11/8/2022 at 10:31 AM
11/9/2022 1245 CM met with pt at the bedside for transition of care needs at d/c.  Per cate Camargo at [address] [facility] they plan on pt returning and  [address]. Per Mary Jo esposito they have Bipap at [facility] and staff knows how to use it and that pt tends to refuse wearing it. Will NEED SIGNED [place], and a RAPID COVID the day of d/c. CM will follow.  Electronically signed by Boris Echavarria RN on 11/9/2022 at 12:55 PM
LVM for son Corona Gan explaining IMM letter and requested a call back.  Electronically signed by Price Kirkland on 11/8/2022 at 11:59 AM
LVM for son Warden Ryan explaining IMM letter and requested a call back.  Electronically signed by Jasmyne Ramirez on 11/11/2022 at 8:35 AM
28-Apr-2018 20:55

## 2022-11-11 NOTE — PROGRESS NOTES
Patient is seen in follow-up for CHF. Subjective:     Ms. Erven Holstein feels better today  Sitting up in bed no apparent distress    ROS:  CONSTITUTIONAL:  negative for  fevers, chills  HEENT:  negative for earaches, nasal congestion and epistaxis  RESPIRATORY:  negative for  dry cough, cough with sputum,wheezing and hemoptysis  GASTROINTESTINAL:  negative for nausea, vomiting  MUSCULOSKELETAL:  negative for  myalgias, arthralgias  NEUROLOGICAL:  negative for visual disturbance, dysphagia    Medication side effects: none    Scheduled Meds:   predniSONE  30 mg Oral BID    furosemide  40 mg Oral TID    metOLazone  5 mg Oral Daily    insulin lispro  0-4 Units SubCUTAneous Nightly    insulin lispro  0-8 Units SubCUTAneous TID WC    amiodarone  200 mg Oral Daily    apixaban  5 mg Oral BID    doxycycline (VIBRAMYCIN) IV  100 mg IntraVENous Q12H    atorvastatin  20 mg Oral Nightly    budesonide  0.5 mg Nebulization BID    carbidopa-levodopa  2 tablet Oral TID    divalproex  250 mg Oral BID    insulin glargine  13 Units SubCUTAneous BID    miconazole  1 each Topical BID    pantoprazole  40 mg Oral QAM    rOPINIRole  1 mg Oral TID    sucralfate  1 g Oral 4x Daily    sodium chloride flush  5-40 mL IntraVENous 2 times per day    ipratropium-albuterol  1 ampule Inhalation Q4H WA     Continuous Infusions:   dextrose      sodium chloride       PRN Meds:LORazepam, glucose, dextrose bolus **OR** dextrose bolus, glucagon (rDNA), dextrose, acetaminophen, docusate sodium, sodium chloride flush, sodium chloride, ondansetron **OR** ondansetron, polyethylene glycol, acetaminophen **OR** acetaminophen    I/O last 3 completed shifts: In: 36 [P.O.:820]  Out: 2550 [Urine:2550]  No intake/output data recorded.       Objective:      Physical Exam:   BP (!) 151/97   Pulse 87   Temp 97.5 °F (36.4 °C) (Oral)   Resp 20   Ht 5' (1.524 m)   Wt 232 lb 9 oz (105.5 kg)   SpO2 93%   BMI 45.42 kg/m²   CONSTITUTIONAL:  awake, alert, cooperative, no apparent distress, and appears stated age  HEAD:  normocepalic, without obvious abnormality, atraumatic  NECK:  Supple, symmetrical, trachea midline, no adenopathy, thyroid symmetric, not enlarged and no tenderness, skin normal  LUNGS:  No increased work of breathing, No accessory muscle use or intercostal retractions, decreased air exchange, bilateral rales  CARDIOVASCULAR:  Normal apical impulse, irregularly irregular, normal S1 and S2, no S3, 3/6 systolic murmur at the left lower sternal border,, no edema, no JVD, no carotid bruit. ABDOMEN:  Soft, nontender, no masses, no hepatomegaly, no splenomegaly, BS+  MUSCULOSKELETAL:  No clubbing no cyanosis. there is no redness, warmth, or swelling of the joints  full range of motion noted  NEUROLOGIC:  Alert, awake,oriented x3  SKIN:  no bruising or bleeding, normal skin color, texture, turgor and no redness, warmth, or swelling      Cardiographics  I personally reviewed the telemetry monitor strips with the following interpretation: Atrial fibrillation with controlled ventricular response    Echocardiogram: 7/7/2022,Summary   Technically difficult study - limited visualization. Micro-bubble contrast injected to enhance left ventricular visualization. Normal left ventricular size and systolic function. Ejection fraction is visually estimated at 70-75%. Indeterminate diastolic function. No regional wall motion abnormalities seen. Mild left ventricular concentric hypertrophy noted. Moderately dilated right ventricle with reduced function. Biatrial dilation. Mild tricuspid regurgitation. RVSP is at least 60 mmHg. Prominent pericardial fat pad. No definitive evidence of pericardial effusion. Imaging  CT CHEST WO CONTRAST   Final Result   Bilateral pleural effusions large on the right and small on the left with   collapse of the right middle and lower lobe.   Four-chamber cardiac   enlargement with enlargement of the pulmonary arteries to suggest pulmonary   arterial hypertension and small pericardial effusion. Findings raise a   concern for possible failure. XR CHEST PORTABLE   Final Result   1. Small to moderate right pleural effusion   2. Right lung base atelectasis   3. Cardiomegaly             Lab Review   Lab Results   Component Value Date/Time     11/09/2022 10:33 AM    K 4.8 11/09/2022 10:33 AM    CL 91 11/09/2022 10:33 AM    CO2 36 11/09/2022 10:33 AM    BUN 69 11/09/2022 10:33 AM    CREATININE 1.8 11/09/2022 10:33 AM    GLUCOSE 349 11/09/2022 10:33 AM    CALCIUM 9.2 11/09/2022 10:33 AM     Lab Results   Component Value Date/Time    WBC 6.5 11/09/2022 10:33 AM    HGB 10.1 11/09/2022 10:33 AM    HCT 35.8 11/09/2022 10:33 AM    MCV 92.5 11/09/2022 10:33 AM     11/09/2022 10:33 AM     I have personally reviewed the laboratory, cardiac diagnostic and radiographic testing as outlined above:    Assessment:     1. Acute exacerbation of congestive heart failure: Decompensated, second admission in less than a month, IV fluids balance is negative for liters, since admission  2. Acute hypoxic respiratory failure: Secondary to above, improving  3. Atrial fibrillation with RVR: Rate is better controlled  4. Elevated troponin: Secondary to acute hypoxic ventilatory failure  5. CAD: Nonobstructive involving LAD and left circumflex artery  6. Tricuspid valve regurgitation: Mild  7. Pulmonary hypertension: Moderate  8. Hypertension: Controlled  9. Hyperlipidemia  10. Type 2 diabetes mellitus  11. History of beta-blocker and amiodarone induced bradycardia  12. History of digitoxicity  13. Acute kidney injury: Stable    Recommendations:     1. Was transitioned to oral diuretics by primary service  2. Continue the rest of medications  3. Intake and output  4. Basic metabolic panel and CBC in a.m.  5.  Increase ambulation as tolerated  6.   Recommend increasing outpatient diuretics to Bumex 1 mg twice daily    No active cardiac problems, will see as needed, thank you  Discussed with patient no family at bedside    Electronically signed by Amy Montes MD on 11/11/2022 at 10:44 AM  NOTE: This report was transcribed using voice recognition software.  Every effort was made to ensure accuracy; however, inadvertent computerized transcription errors may be present

## 2022-11-11 NOTE — PLAN OF CARE
Patient's chart updated to reflect:      . - HF care plan, HF education points and HF discharge instructions.  -Orders: 2 gram sodium diet, daily weights, I/O.  -PCP and cardiology follow up appointments to be scheduled within 7 days of hospital discharge. -CHF education session will be provided to the patient prior to hospital discharge.   Ozell Severin MSN, RN  Heart Failure Navigator

## 2022-11-11 NOTE — PROGRESS NOTES
IV doxycycline infiltrated on previous shift. This nurse and Caron RN from ICU attempted to place new peripheral site, unable to obtain access.

## 2022-11-12 NOTE — PROGRESS NOTES
Patient agitated that she has not been picked up to return to care facility, explained to patient that emergent calls take priority for ambulance companies. Called for updated ETA, [facility] states it will be around 0300 pickup.

## 2022-11-12 NOTE — PROGRESS NOTES
Patient picked up by [facility] at [place] [address] with all belongings, Attempted to call report to [facility] on hold for ten minutes, will attempt to call back.

## 2022-11-14 ASSESSMENT — ENCOUNTER SYMPTOMS
VOMITING: 0
SHORTNESS OF BREATH: 1
NAUSEA: 0

## 2022-11-14 NOTE — DISCHARGE SUMMARY
Discharge Summary    Date: 11/14/2022  Patient Name: Andrea Rodriguez    YOB: 1950     Age: 67 y.o. Admit Date: 10/12/2022  Discharge Date: 10/22/2022  Discharge Condition: Stable    Admission Diagnosis  COPD exacerbation ([facility].) [J44.1]; Acute on chronic respiratory failure with hypercapnia ([facility] [J96.22]; Acute on chronic congestive heart failure, unspecified heart failure type ([facility].) [I50.9]      Discharge Diagnosis  Principal Problem:    COPD exacerbation ([facility]  Active Problems:    Acute on chronic respiratory failure with hypercapnia (HCC)    Persistent atrial fibrillation (HCC)  Resolved Problems:    * No resolved hospital problems. [facility] AND [facility] Hospital Stay  Narrative of Hospital Course:  Admitted for CHF  Responded well to IV diuresis and respiratory therapies  Discharged back to [facility]    Consultants:  IP CONSULT TO PULMONOLOGY  IP CONSULT TO CARDIOLOGY  PHARMACY TO DOSE VANCOMYCIN  IP CONSULT TO HEART FAILURE NURSE/COORDINATOR    Surgeries/procedures Performed:  thoracentesis     Treatments:    Cardiac Medications and Procedures    Ace Inhibitor, Beta-Blocker and Furosemide, Thoracentesis    Discharge Plan/Disposition:  To [facility]/Incidental Findings Requiring Follow Up:    Patient Instructions:    Diet: Cardiac Diet    Activity:Activity as Tolerated  For number of days (if applicable): Other Instructions:    Provider Follow-Up:   No follow-ups on file. Significant Diagnostic Studies:    Recent Labs:  Admission on 10/12/2022, Discharged on 10/22/2022  No results displayed because visit has over 200 results. ------------    Radiology last 7 days:  CT CHEST WO CONTRAST    Result Date: 11/8/2022  Bilateral pleural effusions large on the right and small on the left with collapse of the right middle and lower lobe. Four-chamber cardiac enlargement with enlargement of the pulmonary arteries to suggest pulmonary arterial hypertension and small pericardial effusion. Findings raise a concern for possible failure. Pending Labs     Order Current Status    Arterial Blood Gas, Respiratory Only Collected (10/12/22 2025)    Arterial Blood Gas, Respiratory Only Collected (10/13/22 0254)    Culture with Smear, Acid Fast Bacillius Preliminary result        Discharge Medications    Discharge Medication List as of 10/22/2022 10:50 AM    START taking these medications    predniSONE (DELTASONE) 5 MG tablet  Take 3 tablets by mouth daily for 10 days, Disp-30 tablet, R-0  Normal          Discharge Medication List as of 10/22/2022 10:50 AM        Discharge Medication List as of 10/22/2022 10:50 AM    CONTINUE these medications which have NOT CHANGED    metoprolol succinate (TOPROL XL) 100 MG extended release tablet  Take 1 tablet by mouth in the morning and at bedtime, Disp-30 tablet, R-3  Normal    furosemide (LASIX) 40 MG tablet  Take 1 tablet by mouth daily, Disp-60 tablet, R-3  Normal    LORazepam (ATIVAN) 0.5 MG tablet  Take 1 tablet by mouth every 8 hours as needed for Anxiety for up to 30 days. , Disp-120 tablet, R-5  Print    apixaban (ELIQUIS) 5 MG TABS tablet  Take 1 tablet by mouth 2 times daily for 6 doses, Disp-60 tablet, R-5  Normal    polyethylene glycol (GLYCOLAX) 17 g packet  Take 17 g by mouth daily as needed for Constipation, Disp-527 g, R-0  Normal    loperamide (IMODIUM) 2 MG capsule  Take 2 mg by mouth 4 times daily as needed for Diarrhea  Historical Med    OXYGEN  Inhale 2 L into the lungs continuous  Historical Med    zolpidem (AMBIEN) 5 MG tablet  Take 5 mg by mouth nightly.   Historical Med    docusate sodium (COLACE, DULCOLAX) 100 MG CAPS  Take 100 mg by mouth 2 times daily as needed for Constipation  DC to SNF    insulin glargine (LANTUS) 100 UNIT/ML injection vial  Inject 13 Units into the skin 2 times daily, Disp-10 mL, R-0  DC to SNF    atorvastatin (LIPITOR) 20 MG tablet  Take 20 mg by mouth nightly  Historical Med    benzoin compound solution  Apply 1 Applicatorful topically nightly Apply topically to right toe, Topical, NIGHTLY, Historical Med    ipratropium-albuterol (DUONEB) 0.5-2.5 (3) MG/3ML SOLN nebulizer solution  Inhale 1 vial into the lungs 4 times daily  Historical Med    miconazole (MICOTIN) 2 % powder  Apply 1 each topically 2 times daily Apply topically under breasts twice daily, Topical, 2 TIMES DAILY, Historical Med    pantoprazole (PROTONIX) 40 MG tablet  Take 40 mg by mouth every morning  Historical Med    mirtazapine (REMERON) 15 MG tablet  Take 15 mg by mouth nightly  Historical Med    budesonide-formoterol (SYMBICORT) 160-4.5 MCG/ACT AERO  Inhale 2 puffs into the lungs 2 times daily  Historical Med    acetaminophen (TYLENOL) 325 MG tablet  Take 650 mg by mouth every 4 hours as needed for Pain or Fever  Historical Med    insulin aspart (NOVOLOG) 100 UNIT/ML injection vial  Inject 0-10 Units into the skin 3 times daily (before meals) Blood Glucose 140 - 199 = 1 Unit  Blood Glucose 200 - 249 = 2 Units  Blood Glucose 250 - 299 = 4 Units  Blood Glucose 300 - 349 = 6 Units  Blood Glucose 350 - 399 = 8 Units  Blood Glucose 400+   = 10 Units  Historical Med    promethazine (PHENERGAN) 25 MG tablet  Take 25 mg by mouth every 6 hours as needed for Nausea  Historical Med    carbidopa-levodopa (SINEMET CR)  MG per extended release tablet  Take 2 tablets by mouth in the morning and 2 tablets at noon and 2 tablets in the evening. DC to SNF    rOPINIRole (REQUIP) 1 MG tablet  Take 1 tablet by mouth in the morning and 1 tablet at noon and 1 tablet before bedtime. , Disp-90 tablet, R-3  DC to SNF    sucralfate (CARAFATE) 1 GM tablet  Take 1 tablet by mouth in the morning and 1 tablet at noon and 1 tablet in the evening and 1 tablet before bedtime. , Disp-120 tablet, R-1  Normal    aspirin EC 81 MG EC tablet  Take 1 tablet by mouth daily, Disp-30 tablet, R-0  NO PRINT    montelukast (SINGULAIR) 10 MG tablet  Take 10 mg by mouth nightly  Historical Med          Discharge Medication List as of 10/22/2022 10:50 AM    STOP taking these medications    sulfamethoxazole-trimethoprim (BACTRIM DS;SEPTRA DS) 800-160 MG per tablet  Comments:  Reason for Stopping:    metoprolol tartrate (LOPRESSOR) 25 MG tablet  Comments:  Reason for Stopping:    amiodarone (CORDARONE) 200 MG tablet  Comments:  Reason for Stopping:    budesonide (PULMICORT) 0.5 MG/2ML nebulizer suspension  Comments:  Reason for Stopping:    insulin glargine-yfgn (SEMGLEE-YFGN) 100 UNIT/ML injection vial  Comments:  Reason for Stopping:    QUEtiapine (SEROQUEL) 25 MG tablet  Comments:  Reason for Stopping:    divalproex (DEPAKOTE) 250 MG DR tablet  Comments:  Reason for Stopping:    ferrous sulfate (IRON 325) 325 (65 Fe) MG tablet  Comments:  Reason for Stopping:          Time Spent on Discharge:  40 minutes were spent in patient examination, evaluation, counseling as well as medication reconciliation, prescriptions for required medications, discharge plan, and follow up.     Electronically signed by Elizabeth Willingham DO on 11/14/22 at 1:17 PM EST

## 2022-11-17 ENCOUNTER — TELEPHONE (OUTPATIENT)
Dept: CARDIOLOGY CLINIC | Age: 72
End: 2022-11-17

## 2022-11-17 NOTE — TELEPHONE ENCOUNTER
Lakia Burgos from [facility] called to verify if patient is on a fluid restriction? Pt is scheduled with you on 11/18. Please advise.

## 2022-11-25 ENCOUNTER — TELEPHONE (OUTPATIENT)
Dept: CARDIOLOGY CLINIC | Age: 72
End: 2022-11-25

## 2022-11-25 NOTE — TELEPHONE ENCOUNTER
[facility] called to advise on pt's 3.4lb weight gain in 24hrs. RN increased lasix. Wanted to notify . Please advise. Can reach [facility]OR[facility] Campos station 3.

## 2022-11-30 NOTE — TELEPHONE ENCOUNTER
Per L Rocky CNP instructions. Again watch your daily weight trends and if you gain water weight please follow below instructions.     -If you gain 3-5 pounds in 2-3 days OR notice that you are retaining fluid in anyway just like you did before then take an extra dose of your water pill (extra furosemide/Lasix 40 mg daily PRN) every day until you lose the weight or feel better.      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 Bmp/hemaglobin aic and lipid tomorrow am.   Lasix 40mg daily   Has prn order 40mg lasix when symptomatic. Has access to extra fluids lately. Going over fluid restrictions lately. And family brings in salty foods. Mame Bojorquez will follow the PRN lasix instructions from [facility]. She is going a dose today now 40mg lasix. She is aware that the regular 40mg lasix is in am and that the prn 40mg lasix shoud be utlized at least 6 hrs apart.  Will monitor weights and s/s Elizabeth Rushing RN

## 2022-11-30 NOTE — TELEPHONE ENCOUNTER
PER SNF (11/29/2022)    EXTRA DOSE OF LASIX 40MG GIVEN ON 11/24 AND 11/29/2022 5lb GAIN OVER NIGHT. THEY WILL GET WEIGHT IN THE A.M. NO CHANGE IN RESPIRATORY STATUS.

## 2022-12-01 ENCOUNTER — TELEPHONE (OUTPATIENT)
Dept: CARDIOLOGY CLINIC | Age: 72
End: 2022-12-01

## 2022-12-01 ENCOUNTER — HOSPITAL ENCOUNTER (OUTPATIENT)
Dept: OTHER | Age: 72
Setting detail: THERAPIES SERIES
Discharge: HOME | End: 2022-12-01
Payer: MEDICARE

## 2022-12-01 VITALS
SYSTOLIC BLOOD PRESSURE: 101 MMHG | WEIGHT: 236 LBS | RESPIRATION RATE: 20 BRPM | HEART RATE: 85 BPM | HEIGHT: 60 IN | DIASTOLIC BLOOD PRESSURE: 58 MMHG | OXYGEN SATURATION: 100 % | BODY MASS INDEX: 46.33 KG/M2

## 2022-12-01 LAB
ANION GAP SERPL CALCULATED.3IONS-SCNC: 9 MMOL/L (ref 7–16)
BUN BLDV-MCNC: 30 MG/DL (ref 6–23)
CALCIUM SERPL-MCNC: 8.8 MG/DL (ref 8.6–10.2)
CHLORIDE BLD-SCNC: 97 MMOL/L (ref 98–107)
CO2: 36 MMOL/L (ref 22–29)
CREAT SERPL-MCNC: 1.4 MG/DL (ref 0.5–1)
GFR SERPL CREATININE-BSD FRML MDRD: 40 ML/MIN/1.73
GLUCOSE BLD-MCNC: 168 MG/DL (ref 74–99)
POTASSIUM SERPL-SCNC: 4.1 MMOL/L (ref 3.5–5)
PRO-BNP: 7068 PG/ML (ref 0–125)
SODIUM BLD-SCNC: 142 MMOL/L (ref 132–146)

## 2022-12-01 PROCEDURE — 80048 BASIC METABOLIC PNL TOTAL CA: CPT

## 2022-12-01 PROCEDURE — 83880 ASSAY OF NATRIURETIC PEPTIDE: CPT

## 2022-12-01 PROCEDURE — 36415 COLL VENOUS BLD VENIPUNCTURE: CPT

## 2022-12-01 PROCEDURE — 99204 OFFICE O/P NEW MOD 45 MIN: CPT

## 2022-12-01 PROCEDURE — 99214 OFFICE O/P EST MOD 30 MIN: CPT

## 2022-12-01 PROCEDURE — 96374 THER/PROPH/DIAG INJ IV PUSH: CPT

## 2022-12-01 RX ORDER — FUROSEMIDE 10 MG/ML
40 INJECTION INTRAMUSCULAR; INTRAVENOUS ONCE
Status: DISCONTINUED | OUTPATIENT
Start: 2022-12-01 | End: 2022-12-02 | Stop reason: HOSPADM

## 2022-12-01 RX ORDER — SODIUM CHLORIDE 0.9 % (FLUSH) 0.9 %
5-40 SYRINGE (ML) INJECTION ONCE
Status: DISCONTINUED | OUTPATIENT
Start: 2022-12-01 | End: 2022-12-02 | Stop reason: HOSPADM

## 2022-12-01 RX ORDER — LORAZEPAM 0.5 MG/1
0.5 TABLET ORAL NIGHTLY PRN
COMMUNITY

## 2022-12-01 NOTE — PROGRESS NOTES
Congestive Heart Failure [address]     Wes Santillan   1950    Referring Provider: Greg Peabody APRN-CNP   Primary Care Physician: Dr. Nikita Fall   Cardiologist: Teresa VINCENT   Nephrologist: ESDRAS    Resides at [facility] in [facility]     History of Present Illness:     Wes Santillan is a 67 y.o. female with a history of HFpEF, most recent EF 70-75% TTE. Patient Story:  She does  have dyspnea with exertion, shortness of breath, or decline in overall functional capacity. She does not have orthopnea, PND, nocturnal cough or hemoptysis. (Currently just started wearing a Bipap @ HS for 6 hrs at a time she states). She does have abdominal distention or bloating, early satiety, anorexia/change in appetite. She does not has a good urinary response to  oral diuretic per patient. ( 2-3x during the day and 1x during the night). Pt states only drinks about 30 oz daily. Encouraged to drink about 64 oz daily to stay hydrated. She does have  lower extremity edema. She denies lightheadedness, dizziness. She denies palpitations, syncope or near syncope. She does not complain of chest pain, pressure, discomfort. Allergies   Allergen Reactions    Ciprofloxacin Nausea And Vomiting    Codeine Itching     Creepy crawly \" feelings         Outpatient Medications Marked as Taking for the 12/1/22 encounter [facility] Encounter) with [facility] CHF ROOM 1   Medication Sig Dispense Refill    LORazepam (ATIVAN) 0.5 MG tablet Take 0.5 mg by mouth nightly as needed for Anxiety (prior to applying bipap).       insulin lispro (HUMALOG) 100 UNIT/ML injection vial Inject into the skin 3 times daily (before meals)      sertraline (ZOLOFT) 50 MG tablet Take 50 mg by mouth daily       Current Outpatient Medications on File Prior to Encounter   Medication Sig Dispense Refill    LORazepam (ATIVAN) 0.5 MG tablet Take 0.5 mg by mouth nightly as needed for Anxiety (prior to applying bipap). insulin lispro (HUMALOG) 100 UNIT/ML injection vial Inject into the skin 3 times daily (before meals)      sertraline (ZOLOFT) 50 MG tablet Take 50 mg by mouth daily      polyethylene glycol (GLYCOLAX) 17 g packet Take 17 g by mouth daily as needed for Constipation 527 g 1    metoprolol succinate (TOPROL XL) 100 MG extended release tablet Take 1 tablet by mouth in the morning and at bedtime 30 tablet 3    furosemide (LASIX) 40 MG tablet Take 1 tablet by mouth daily (Patient taking differently: Take 40 mg by mouth daily May give an extra 40 mg lasix by mouth every 24 hours as needed for CHF  if wt. Gain 3-5 lbs in 2-3 days. Give until weight at baseline.  If more than 2 doses in 1 week call doctor,) 60 tablet 3    loperamide (IMODIUM) 2 MG capsule Take 2 mg by mouth 4 times daily as needed for Diarrhea      OXYGEN Inhale 2 L into the lungs continuous      docusate sodium (COLACE, DULCOLAX) 100 MG CAPS Take 100 mg by mouth 2 times daily as needed for Constipation      insulin glargine (LANTUS) 100 UNIT/ML injection vial Inject 13 Units into the skin 2 times daily 10 mL 0    atorvastatin (LIPITOR) 20 MG tablet Take 20 mg by mouth nightly      benzoin compound solution Apply 1 Applicatorful topically nightly Apply topically to right toe      ipratropium-albuterol (DUONEB) 0.5-2.5 (3) MG/3ML SOLN nebulizer solution Inhale 1 vial into the lungs 4 times daily      miconazole (MICOTIN) 2 % powder Apply 1 each topically 2 times daily Apply topically under breasts twice daily      pantoprazole (PROTONIX) 40 MG tablet Take 40 mg by mouth every morning      mirtazapine (REMERON) 15 MG tablet Take 15 mg by mouth nightly      budesonide-formoterol (SYMBICORT) 160-4.5 MCG/ACT AERO Inhale 2 puffs into the lungs 2 times daily      acetaminophen (TYLENOL) 325 MG tablet Take 650 mg by mouth every 4 hours as needed for Pain or Fever      [DISCONTINUED] metoprolol tartrate (LOPRESSOR) 25 MG tablet Take 0.5 tablets by mouth in the morning and 0.5 tablets before bedtime. 60 tablet 3    carbidopa-levodopa (SINEMET CR)  MG per extended release tablet Take 2 tablets by mouth in the morning and 2 tablets at noon and 2 tablets in the evening. rOPINIRole (REQUIP) 1 MG tablet Take 1 tablet by mouth in the morning and 1 tablet at noon and 1 tablet before bedtime. 90 tablet 3    [DISCONTINUED] amiodarone (CORDARONE) 200 MG tablet Take 1 tablet by mouth in the morning. [DISCONTINUED] budesonide (PULMICORT) 0.5 MG/2ML nebulizer suspension Take 2 mLs by nebulization in the morning and 2 mLs in the evening. 60 each 3    [DISCONTINUED] insulin glargine-yfgn (SEMGLEE-YFGN) 100 UNIT/ML injection vial Inject 5 Units into the skin nightly      [DISCONTINUED] QUEtiapine (SEROQUEL) 25 MG tablet Take 1 tablet by mouth in the morning and 1 tablet before bedtime. 60 tablet 3    sucralfate (CARAFATE) 1 GM tablet Take 1 tablet by mouth in the morning and 1 tablet at noon and 1 tablet in the evening and 1 tablet before bedtime. 120 tablet 1    [DISCONTINUED] divalproex (DEPAKOTE) 250 MG DR tablet Take 250 mg by mouth 2 times daily      [DISCONTINUED] ferrous sulfate (IRON 325) 325 (65 Fe) MG tablet Take 325 mg by mouth daily (with breakfast) (Patient not taking: Reported on 7/7/2022)      aspirin EC 81 MG EC tablet Take 1 tablet by mouth daily 30 tablet 0    montelukast (SINGULAIR) 10 MG tablet Take 10 mg by mouth nightly       No current facility-administered medications on file prior to encounter.        Guideline directed medical:  ARNI/ACE I/ARB: No  Beta blocker:  Yes metoprolol succinate 100 mg BID  Aldosterone antagonist:  No  SGLT2i:  No      Physical Examination:     BP (!) 101/58   Pulse 85   Resp 20   Ht 5' (1.524 m)   Wt 236 lb (107 kg)   SpO2 100%   BMI 46.09 kg/m²     Assessment  Charting Type: Shift assessment    Neurological  Level of Consciousness: Alert (0)  Orientation Level: Oriented X4  Cognition: Follows commands    HEENT (Head, Ears, Eyes, Nose, & Throat)  HEENT (WDL): Exceptions to WDL  Right Eye: Glasses, Impaired vision  Left Eye: Glasses, Impaired vision  Throat: Dry, Other (comment) (Raspy)    Respiratory  Respiratory Pattern: Regular  Respiratory Depth: Normal  Respiratory Quality/Effort: Unlabored  Chest Assessment: Chest expansion symmetrical  L Breath Sounds: Diminished, Clear  R Breath Sounds: Diminished, Clear (hx of pleural effusion- x2)      Cough/Sputum  Cough: Dry  Sputum Amount: None    Cardiac  Cardiac Regularity: Irregular  Heart Sounds: S1, S2  Cardiac Rhythm: Atrial fib    Rhythm Interpretation  Heart Rate: 85    Gastrointestinal  Abdominal (WDL): Exceptions to WDL  Abdomen Inspection: Obese, Soft  RUQ Bowel Sounds: Active  LUQ Bowel Sounds: Active  RLQ Bowel Sounds: Active  LLQ Bowel Sounds: Active  Tenderness: Soft, No guarding     Bowel Sounds  RUQ Bowel Sounds: Active  LUQ Bowel Sounds: Active  RLQ Bowel Sounds: Active  LLQ Bowel Sounds:  Active    Peripheral Vascular  Peripheral Vascular (WDL): Within Defined Limits  Edema: Right lower extremity, Left lower extremity  RUE Edema: Trace, Pitting  LUE Edema: +1, Pitting  RLE Edema: Pitting, +3  LLE Edema: Pitting, +3      Musculoskeletal  RUE: Weakness  LUE: Weakness  RL Extremity: Weakness  LL Extremity: Weakness    Psychosocial  Psychosocial (WDL): Exceptions to WDL    Cough Description  Sputum Amount: None    Heart Rate: 85        LAB DATA:    Last 3 BMP      Sodium (mmol/L)   Date Value   12/01/2022 142   11/11/2022 141   11/09/2022 138     Potassium (mmol/L)   Date Value   12/01/2022 4.1   11/11/2022 4.2   11/08/2022 5.2 (H)     Potassium reflex Magnesium (mmol/L)   Date Value   11/09/2022 4.8   11/07/2022 5.5 (H)   10/13/2022 4.2     Chloride (mmol/L)   Date Value   12/01/2022 97 (L)   11/11/2022 92 (L)   11/09/2022 91 (L)     CO2 (mmol/L)   Date Value   12/01/2022 36 (H)   11/11/2022 36 (H)   11/09/2022 36 (H)     BUN (mg/dL) Date Value   12/01/2022 30 (H)   11/11/2022 67 (H)   11/09/2022 69 (H)     Glucose (mg/dL)   Date Value   12/01/2022 168 (H)   11/11/2022 268 (H)   11/09/2022 349 (H)     Calcium (mg/dL)   Date Value   12/01/2022 8.8   11/11/2022 9.2   11/09/2022 9.2       Last 3 BNP       Pro-BNP (pg/mL)   Date Value   12/01/2022 7,068 (H)   11/06/2022 9,778 (H)   10/12/2022 7,115 (H)          CBC: No results for input(s): WBC, HGB, PLT in the last 72 hours. BMP:    Recent Labs     12/01/22  0920      K 4.1   CL 97*   CO2 36*   BUN 30*   CREATININE 1.4*   GLUCOSE 168*     Hepatic: No results for input(s): AST, ALT, ALB, BILITOT, ALKPHOS in the last 72 hours. Troponin: No results for input(s): TROPONINI in the last 72 hours. BNP: No results for input(s): BNP in the last 72 hours. Lipids: No results for input(s): CHOL, HDL in the last 72 hours. Invalid input(s): LDLCALCU  INR: No results for input(s): INR in the last 72 hours. WEIGHTS:    Wt Readings from Last 3 Encounters:   12/01/22 236 lb (107 kg)   11/18/22 220 lb 12.8 oz (100.2 kg)   11/11/22 232 lb 9 oz (105.5 kg)     TELEMETRY:  Cardiac Regularity: Irregular  Cardiac Rhythm/Interpretation: AFib     ASSESSMENT:  Donell Paredes  First visit with CHF clinic today. Discussed with patient the purpose of CHF clinic and importance of daily weights and doing a self check every day to monitor for changes. Upon assessment bilatearl lungs are diminished, abdomen soft,  + 3 pitting edema BLE and + 1 pitting edema. 1 week follow up appointment to evaluation IVP diuretics.      Interventions completed this visit:  IV diuretics given yes, IV lasix 40mg x1  Lab work obtained yes, pro-BNP BMP   Reviewed currently prescribed medications with patient, educated on importance of compliance and answered any questions regarding their medication  Educated on signs and symptoms of HF  Educated on low sodium diet    PLAN:  Scheduled to follow up in CHF clinic on   Future Appointments   Date Time Provider Prateek Nassar   12/6/2022 10:30 AM [facility] CHF ROOM 1 [facility] CHF Niyah Hernández     Given clinic phone number and aware of signs and symptoms to call with any HF change in symptoms.

## 2022-12-01 NOTE — PLAN OF CARE
Problem: Chronic Conditions and Co-morbidities  Goal: Patient's chronic conditions and co-morbidity symptoms are monitored and maintained or improved  Outcome: Progressing     Problem: Discharge Planning  Goal: Discharge to home or other facility with appropriate resources  Outcome: Progressing     Problem: Discharge Planning  Goal: Discharge to home or other facility with appropriate resources  Outcome: Progressing

## 2022-12-01 NOTE — TELEPHONE ENCOUNTER
Sanya Osuna from [facility] called said she was instructed to call if the patient received more than two extra doses of lasix. She has had three extra doses since the 24th and is up 3.2 pounds since last night.  Please advise

## 2022-12-02 NOTE — TELEPHONE ENCOUNTER
I called the nursing facility and asked if we could get a list of medications.  Amy Burrell the nurse said she would fax them to the office

## 2022-12-05 ENCOUNTER — TELEPHONE (OUTPATIENT)
Dept: OTHER | Age: 72
End: 2022-12-05

## 2022-12-05 NOTE — TELEPHONE ENCOUNTER
Called patient's facility [facility] (spoke to Parmele)  to remind of patient's appt tomorrow 12/6/2022. Facility did not have appointment written down and states they will attempt to find transportation. Will call back CHF clinic to reschedule if needed.     Joe Payne RN

## 2022-12-06 ENCOUNTER — HOSPITAL ENCOUNTER (OUTPATIENT)
Dept: OTHER | Age: 72
Setting detail: THERAPIES SERIES
Discharge: HOME | End: 2022-12-06
Payer: MEDICARE

## 2022-12-06 VITALS
HEART RATE: 80 BPM | RESPIRATION RATE: 20 BRPM | SYSTOLIC BLOOD PRESSURE: 94 MMHG | HEIGHT: 60 IN | OXYGEN SATURATION: 94 % | BODY MASS INDEX: 48.29 KG/M2 | WEIGHT: 246 LBS | DIASTOLIC BLOOD PRESSURE: 52 MMHG

## 2022-12-06 LAB
ANION GAP SERPL CALCULATED.3IONS-SCNC: 9 MMOL/L (ref 7–16)
BUN BLDV-MCNC: 39 MG/DL (ref 6–23)
CALCIUM SERPL-MCNC: 8.7 MG/DL (ref 8.6–10.2)
CHLORIDE BLD-SCNC: 96 MMOL/L (ref 98–107)
CO2: 35 MMOL/L (ref 22–29)
CREAT SERPL-MCNC: 1.7 MG/DL (ref 0.5–1)
GFR SERPL CREATININE-BSD FRML MDRD: 31 ML/MIN/1.73
GLUCOSE BLD-MCNC: 175 MG/DL (ref 74–99)
POTASSIUM SERPL-SCNC: 4.3 MMOL/L (ref 3.5–5)
PRO-BNP: 9187 PG/ML (ref 0–125)
SODIUM BLD-SCNC: 140 MMOL/L (ref 132–146)

## 2022-12-06 PROCEDURE — 80048 BASIC METABOLIC PNL TOTAL CA: CPT

## 2022-12-06 PROCEDURE — 99214 OFFICE O/P EST MOD 30 MIN: CPT

## 2022-12-06 PROCEDURE — 36415 COLL VENOUS BLD VENIPUNCTURE: CPT

## 2022-12-06 PROCEDURE — 6360000002 HC RX W HCPCS: Performed by: INTERNAL MEDICINE

## 2022-12-06 PROCEDURE — 96374 THER/PROPH/DIAG INJ IV PUSH: CPT

## 2022-12-06 PROCEDURE — 2580000003 HC RX 258: Performed by: INTERNAL MEDICINE

## 2022-12-06 PROCEDURE — 83880 ASSAY OF NATRIURETIC PEPTIDE: CPT

## 2022-12-06 RX ORDER — FUROSEMIDE 10 MG/ML
40 INJECTION INTRAMUSCULAR; INTRAVENOUS ONCE
Status: COMPLETED | OUTPATIENT
Start: 2022-12-06 | End: 2022-12-06

## 2022-12-06 RX ORDER — SODIUM CHLORIDE 0.9 % (FLUSH) 0.9 %
5-40 SYRINGE (ML) INJECTION ONCE
Status: COMPLETED | OUTPATIENT
Start: 2022-12-06 | End: 2022-12-06

## 2022-12-06 RX ADMIN — FUROSEMIDE 40 MG: 10 INJECTION, SOLUTION INTRAMUSCULAR; INTRAVENOUS at 10:49

## 2022-12-06 RX ADMIN — SODIUM CHLORIDE, PRESERVATIVE FREE 10 ML: 5 INJECTION INTRAVENOUS at 10:49

## 2022-12-06 NOTE — PROGRESS NOTES
Congestive Heart Failure [address]     Valente Lee   1950    Referring Provider: Arnoldo VINCENT   Primary Care Physician: Dr. Shelly Moody   Cardiologist: Eleuterio Rinne APRN-CNP   Nephrologist: ESDRAS    Resides at [facility] in [place]     History of Present Illness:     Valente Lee is a 67 y.o. female with a history of HFpEF, most recent EF 70-75% TTE. Patient Story:  She does  have dyspnea with exertion, shortness of breath, or decline in overall functional capacity ( which has not changed since last CHF clinic appointment-- patient is currently on 3L of 02 AAT) She does not have orthopnea, PND, nocturnal cough or hemoptysis. (Currently just started wearing a Bipap @ HS for 1-2 hrs at a time per her RN Randy Music. She does have abdominal distention or bloating, early satiety, anorexia/change in appetite. She does not has a good urinary response to  oral diuretic per patient. Encouraged to drink about 64 oz daily to stay hydrated. She does have  lower extremity edema. She denies lightheadedness, dizziness. She denies palpitations, syncope or near syncope. She does not complain of chest pain, pressure, discomfort. Allergies   Allergen Reactions    Ciprofloxacin Nausea And Vomiting    Codeine Itching     Creepy crawly \" feelings         No outpatient medications have been marked as taking for the 12/6/22 encounter [facility] Encounter) with [facility] CHF ROOM 1. Current Outpatient Medications on File Prior to Encounter   Medication Sig Dispense Refill    LORazepam (ATIVAN) 0.5 MG tablet Take 0.5 mg by mouth nightly as needed for Anxiety (prior to applying bipap).       insulin lispro (HUMALOG) 100 UNIT/ML injection vial Inject into the skin 3 times daily (before meals)      sertraline (ZOLOFT) 50 MG tablet Take 50 mg by mouth daily      polyethylene glycol (GLYCOLAX) 17 g packet Take 17 g by mouth daily as needed for Constipation 527 g 1    metoprolol succinate (TOPROL XL) 100 MG extended release tablet Take 1 tablet by mouth in the morning and at bedtime 30 tablet 3    furosemide (LASIX) 40 MG tablet Take 1 tablet by mouth daily (Patient taking differently: Take 40 mg by mouth daily May give an extra 40 mg lasix by mouth every 24 hours as needed for CHF  if wt. Gain 3-5 lbs in 2-3 days. Give until weight at baseline. If more than 2 doses in 1 week call doctor,) 60 tablet 3    loperamide (IMODIUM) 2 MG capsule Take 2 mg by mouth 4 times daily as needed for Diarrhea      OXYGEN Inhale 2 L into the lungs continuous      docusate sodium (COLACE, DULCOLAX) 100 MG CAPS Take 100 mg by mouth 2 times daily as needed for Constipation      insulin glargine (LANTUS) 100 UNIT/ML injection vial Inject 13 Units into the skin 2 times daily 10 mL 0    atorvastatin (LIPITOR) 20 MG tablet Take 20 mg by mouth nightly      benzoin compound solution Apply 1 Applicatorful topically nightly Apply topically to right toe      ipratropium-albuterol (DUONEB) 0.5-2.5 (3) MG/3ML SOLN nebulizer solution Inhale 1 vial into the lungs 4 times daily      miconazole (MICOTIN) 2 % powder Apply 1 each topically 2 times daily Apply topically under breasts twice daily      pantoprazole (PROTONIX) 40 MG tablet Take 40 mg by mouth every morning      mirtazapine (REMERON) 15 MG tablet Take 15 mg by mouth nightly      budesonide-formoterol (SYMBICORT) 160-4.5 MCG/ACT AERO Inhale 2 puffs into the lungs 2 times daily      acetaminophen (TYLENOL) 325 MG tablet Take 650 mg by mouth every 4 hours as needed for Pain or Fever      [DISCONTINUED] metoprolol tartrate (LOPRESSOR) 25 MG tablet Take 0.5 tablets by mouth in the morning and 0.5 tablets before bedtime. 60 tablet 3    carbidopa-levodopa (SINEMET CR)  MG per extended release tablet Take 2 tablets by mouth in the morning and 2 tablets at noon and 2 tablets in the evening.       rOPINIRole (REQUIP) 1 MG tablet Take 1 tablet by mouth in the morning and 1 tablet at noon and 1 tablet before bedtime. 90 tablet 3    [DISCONTINUED] amiodarone (CORDARONE) 200 MG tablet Take 1 tablet by mouth in the morning. [DISCONTINUED] budesonide (PULMICORT) 0.5 MG/2ML nebulizer suspension Take 2 mLs by nebulization in the morning and 2 mLs in the evening. 60 each 3    [DISCONTINUED] insulin glargine-yfgn (SEMGLEE-YFGN) 100 UNIT/ML injection vial Inject 5 Units into the skin nightly      [DISCONTINUED] QUEtiapine (SEROQUEL) 25 MG tablet Take 1 tablet by mouth in the morning and 1 tablet before bedtime. 60 tablet 3    sucralfate (CARAFATE) 1 GM tablet Take 1 tablet by mouth in the morning and 1 tablet at noon and 1 tablet in the evening and 1 tablet before bedtime. 120 tablet 1    [DISCONTINUED] divalproex (DEPAKOTE) 250 MG DR tablet Take 250 mg by mouth 2 times daily      [DISCONTINUED] ferrous sulfate (IRON 325) 325 (65 Fe) MG tablet Take 325 mg by mouth daily (with breakfast) (Patient not taking: Reported on 7/7/2022)      aspirin EC 81 MG EC tablet Take 1 tablet by mouth daily 30 tablet 0    montelukast (SINGULAIR) 10 MG tablet Take 10 mg by mouth nightly       No current facility-administered medications on file prior to encounter.        Guideline directed medical:  ARNI/ACE I/ARB: No  Beta blocker:  Yes metoprolol succinate 100 mg BID  Aldosterone antagonist:  No  SGLT2i:  No      Physical Examination:     BP (!) 94/52   Pulse 80   Resp 20   Ht 5' (1.524 m)   Wt 246 lb (111.6 kg)   SpO2 94%   BMI 48.04 kg/m²     Assessment  Charting Type: Shift assessment    Neurological  Level of Consciousness: Alert (0)  Orientation Level: Oriented X4  Cognition: Follows commands    HEENT (Head, Ears, Eyes, Nose, & Throat)  HEENT (WDL): Exceptions to WDL  Right Eye: Glasses, Impaired vision  Left Eye: Glasses, Impaired vision  Throat: Dry, Other (comment) (Raspy)    Respiratory  Respiratory Pattern: Regular  Respiratory Depth: Normal  Respiratory Quality/Effort: Unlabored  Chest Assessment: Chest expansion symmetrical  L Breath Sounds: Diminished, Clear  R Breath Sounds: Diminished, Clear (hx of pleural effusion- x2)      Cough/Sputum  Cough: Dry  Sputum Amount: None    Cardiac  Cardiac Regularity: Irregular  Cardiac Rhythm: Atrial fib    Rhythm Interpretation  Heart Rate: 80    Gastrointestinal  Abdominal (WDL): Exceptions to WDL  Abdomen Inspection: Obese (distended)  RUQ Bowel Sounds: Active  LUQ Bowel Sounds: Active  RLQ Bowel Sounds: Active  LLQ Bowel Sounds: Active  Tenderness: Soft, No guarding     Bowel Sounds  RUQ Bowel Sounds: Active  LUQ Bowel Sounds: Active  RLQ Bowel Sounds: Active  LLQ Bowel Sounds:  Active    Peripheral Vascular  Peripheral Vascular (WDL): Within Defined Limits  Edema: Right lower extremity, Left lower extremity  RUE Edema: Trace, Pitting  LUE Edema: +1, Pitting  RLE Edema: Pitting, Non-pitting, +2 (nonpitting up to thighs)  LLE Edema: Pitting, +4 (up to thighs)      Musculoskeletal  RUE: Weakness  LUE: Weakness  RL Extremity: Weakness  LL Extremity: Weakness    Psychosocial  Psychosocial (WDL): Exceptions to WDL    Cough Description  Sputum Amount: None    Heart Rate: 80        LAB DATA:    Last 3 BMP      Sodium (mmol/L)   Date Value   12/06/2022 140   12/01/2022 142   11/11/2022 141     Potassium (mmol/L)   Date Value   12/06/2022 4.3   12/01/2022 4.1   11/11/2022 4.2     Potassium reflex Magnesium (mmol/L)   Date Value   11/09/2022 4.8   11/07/2022 5.5 (H)   10/13/2022 4.2     Chloride (mmol/L)   Date Value   12/06/2022 96 (L)   12/01/2022 97 (L)   11/11/2022 92 (L)     CO2 (mmol/L)   Date Value   12/06/2022 35 (H)   12/01/2022 36 (H)   11/11/2022 36 (H)     BUN (mg/dL)   Date Value   12/06/2022 39 (H)   12/01/2022 30 (H)   11/11/2022 67 (H)     Glucose (mg/dL)   Date Value   12/06/2022 175 (H)   12/01/2022 168 (H)   11/11/2022 268 (H)     Calcium (mg/dL)   Date Value   12/06/2022 8.7   12/01/2022 8.8   11/11/2022 9.2 Last 3 BNP       Pro-BNP (pg/mL)   Date Value   12/06/2022 9,187 (H)   12/01/2022 7,068 (H)   11/06/2022 9,778 (H)          CBC: No results for input(s): WBC, HGB, PLT in the last 72 hours. BMP:    Recent Labs     12/06/22  1050      K 4.3   CL 96*   CO2 35*   BUN 39*   CREATININE 1.7*   GLUCOSE 175*     Hepatic: No results for input(s): AST, ALT, ALB, BILITOT, ALKPHOS in the last 72 hours. Troponin: No results for input(s): TROPONINI in the last 72 hours. BNP: No results for input(s): BNP in the last 72 hours. Lipids: No results for input(s): CHOL, HDL in the last 72 hours. Invalid input(s): LDLCALCU  INR: No results for input(s): INR in the last 72 hours. WEIGHTS:    Wt Readings from Last 3 Encounters:   12/06/22 246 lb (111.6 kg)   12/01/22 236 lb (107 kg)   11/18/22 220 lb 12.8 oz (100.2 kg)     TELEMETRY:  Cardiac Regularity: Irregular  Cardiac Rhythm/Interpretation: AFib     ASSESSMENT:  Dena Severin  return from re-evaluation of IVP diuretics. Pt states she did not have a good urine response and did not noticed any difference in her leg swelling or abdominal bloating. Pt stated she feels like her clothes have been tight the last couple of days. [facility] RN called in on 12/1 to notify office that three extra doses of PRN lasix was given. Upon assessment bilateral lungs are diminished, abdomen distended, + bloating,  + 4 pitting edema LLE ( up to the thighs) and +2 pitting edema RLE ( and nonpitting all the way up to the thighs.)    1 week follow up appointment to evaluation IVP diuretics. [address] [facility] patient's nurse at [address] [place] [address] re:update on patient's appointment. Pt's nurse, Summer Davis, stated she has been ordering food or getting food out from her family. Patient is on a fluid restriction and thicken liquids. Summer Davis states she does not follow fluid restriction or use the thicken liquids sometimes.  Pt refuses to ace wrap her legs or keep them elevated while resting or when she is sleeping. Pt currently has been sleeping in a chair. Pt does wear a Bipap at night. She states she wears it for 1-2 hours at a time. Notified her of upcoming appointment on Monday and IV lasix was given at today's appointment. 3:41 PM Messaged Elizabeth VINCENT via inbasket re: patient status, IV lasix given at today's appointment, current vitals, labs and patient is not having a good urine response to IVP diuretics or PO lasix. Interventions completed this visit:  IV diuretics given yes, IV lasix 40mg x1  Lab work obtained yes, pro-BNP BMP   Reviewed currently prescribed medications with patient, educated on importance of compliance and answered any questions regarding their medication  Educated on signs and symptoms of HF  Educated on low sodium diet    PLAN:  Scheduled to follow up in CHF clinic on   Future Appointments   Date Time Provider Prateek Nassar   12/12/2022 11:30 AM [facility] CHF ROOM [address]     Given clinic phone number and aware of signs and symptoms to call with any HF change in symptoms.

## 2022-12-07 NOTE — PROGRESS NOTES
Received orders from Main VINCENT:     1.Stop lasix   2. Start bumex 2 mg BID x 3 days then go to bumex 2 mg daily   3. Start KDUR 20 meq daily   4. Return to CHF clinic in 1 week       3:16 PM   Faxed orders to [facility] [employer] in [place]. (Fax- [phone])     3:15 PM   Called Roberth Harrington RN re: providers instructions. I  have reviewed the provider's instructions with the patient, answering all questions to her satisfaction.       Future Appointments   Date Time Provider Prateek Nassar   12/12/2022 11:30 AM [facility] CHF ROOM 1 [facility] CHF Jayne Bennett

## 2022-12-08 PROBLEM — R77.8 ELEVATED TROPONIN: Status: RESOLVED | Noted: 2022-11-08 | Resolved: 2022-12-08

## 2022-12-12 ENCOUNTER — HOSPITAL ENCOUNTER (OUTPATIENT)
Dept: OTHER | Age: 72
Setting detail: THERAPIES SERIES
Discharge: HOME | End: 2022-12-12
Payer: MEDICARE

## 2022-12-12 NOTE — PROGRESS NOTES
12:55 PM 12/12/2022 Patient was a  no call no show for today's CHF clinic appointment. [facility]: rescheduling patient for the soonest available time slot for their transportation service.        Future Appointments   Date Time Provider Prateek Nassar   12/19/2022 11:00 AM [facility] CHF ROOM 1 [facility] Akash Florez

## 2022-12-13 ENCOUNTER — TELEPHONE (OUTPATIENT)
Dept: OTHER | Age: 72
End: 2022-12-13

## 2022-12-13 NOTE — TELEPHONE ENCOUNTER
Sultana Sotelo from [facility] called regarding pt up in wt and medication clarification- patient is up 3.6#. today. She previously had a prn Lasix order. Lasix was stopped and Bumex was started on 12/6 by Margi RUSHING. Called and spoke with Margi LOYOLA. Orders received to increase Bumex 2 mg to twice daily and have labs drawn for BMP and BMP today and fax labs to the CHF clinic.     12/13/2022  12:30 PM  Called and notified Sultana Sotelo RN of new medication orders and labs. Faxed order to [phone].

## 2022-12-14 ENCOUNTER — TELEPHONE (OUTPATIENT)
Dept: CARDIOLOGY CLINIC | Age: 72
End: 2022-12-14

## 2022-12-14 ENCOUNTER — TELEPHONE (OUTPATIENT)
Dept: OTHER | Age: 72
End: 2022-12-14

## 2022-12-14 NOTE — TELEPHONE ENCOUNTER
CALLED [facility] AND REMINDED THEM THAT SIDRA NEEDS TO KEEP HER APPT. ON 12/19/22 AT THE [facility]. IT IS VERY IMPORTANT THAT SHE IS SEEN.   CHASE

## 2022-12-14 NOTE — TELEPHONE ENCOUNTER
12/14/22  1026  Labs faxed from [facility] overnight were not legible. Angeles Freeman Rn this am to have her refax labs drawn 12/13 (BMP and BNP). Labs were re-faxed to the CHF clinic but were still not legible. [address] to have her read the lab results over the phone due to poor fax quality and can only able to read every other line. , she is unable to pull up labs as they are still pending from [facility]. She will try to get the results and call us back. [id number] [employer] [facility] and spoke with Casey Matos at [facility] regarding labs and to please re fax lab results as we unable to read them. [address] for results, no answer. Will try again tomorrow during regular business hours. 1700   Received BMP results via fax. Called Faith at [facility] back as fax is not legible, she was also not able to read the results. Legible labs in-boxed to Elizabeth Lujan APRN-CNP. NA- not legible  K+ 5.5  Cl- not legible  CO2 >40  Glucose- not legible  BUN 47  Cr- not legible  Ca- pending    BNP- [id number][facility] stated that they faxed results to Dr. Aries Bauer.

## 2022-12-15 ENCOUNTER — TELEPHONE (OUTPATIENT)
Dept: OTHER | Age: 72
End: 2022-12-15

## 2022-12-15 NOTE — TELEPHONE ENCOUNTER
200 Called spoke with biomedical labs and had them re-faxed labs to the CHF clinic. Narinder VINCENT review labs and ordered repeat labs to be done today. 1135 AM 12/15/2022  Called the facility re: new orders for repeat labs. Received orders for repeat BMP, pro- BNP to be done today. Faxed to the facility. Received a confirmation that order was successful faxed to the [facility].

## 2022-12-16 ENCOUNTER — TELEPHONE (OUTPATIENT)
Dept: OTHER | Age: 72
End: 2022-12-16

## 2022-12-16 NOTE — TELEPHONE ENCOUNTER
12/16/2022     Spoke with the Omari re: patient  appointment for the CHF lcinic on Monday. Confirmed time and date. Also asked about the BMP and Pro-BNP repeat labs by Paulo VINCENT that was ordered for yesterday. RN states that the labs were done today d/t the order give and faxed in the afternoon yesterday. Awaiting Labs results. Facility will fax over the results to the CHF clinic today.        Pacheco Willson RN

## 2022-12-19 ENCOUNTER — APPOINTMENT (OUTPATIENT)
Dept: GENERAL RADIOLOGY | Age: 72
DRG: 291 | End: 2022-12-19
Payer: MEDICARE

## 2022-12-19 ENCOUNTER — HOSPITAL ENCOUNTER (INPATIENT)
Age: 72
LOS: 6 days | Discharge: HOME | DRG: 291 | End: 2022-12-25
Attending: EMERGENCY MEDICINE | Admitting: STUDENT IN AN ORGANIZED HEALTH CARE EDUCATION/TRAINING PROGRAM
Payer: MEDICARE

## 2022-12-19 ENCOUNTER — HOSPITAL ENCOUNTER (OUTPATIENT)
Dept: OTHER | Age: 72
Setting detail: THERAPIES SERIES
Discharge: HOME | End: 2022-12-19
Payer: MEDICARE

## 2022-12-19 VITALS — BODY MASS INDEX: 50.11 KG/M2 | WEIGHT: 256.6 LBS

## 2022-12-19 DIAGNOSIS — I50.43 ACUTE ON CHRONIC COMBINED SYSTOLIC AND DIASTOLIC CONGESTIVE HEART FAILURE (HCC): ICD-10-CM

## 2022-12-19 DIAGNOSIS — N28.9 ACUTE RENAL INSUFFICIENCY: Primary | ICD-10-CM

## 2022-12-19 DIAGNOSIS — D64.9 ANEMIA, UNSPECIFIED TYPE: ICD-10-CM

## 2022-12-19 PROBLEM — I50.9 HEART FAILURE (HCC): Status: ACTIVE | Noted: 2022-12-19

## 2022-12-19 LAB
ANION GAP SERPL CALCULATED.3IONS-SCNC: 7 MMOL/L (ref 7–16)
BUN BLDV-MCNC: 50 MG/DL (ref 6–23)
CALCIUM SERPL-MCNC: 9.1 MG/DL (ref 8.6–10.2)
CHLORIDE BLD-SCNC: 99 MMOL/L (ref 98–107)
CO2: 37 MMOL/L (ref 22–29)
CREAT SERPL-MCNC: 2 MG/DL (ref 0.5–1)
EKG ATRIAL RATE: 113 BPM
EKG Q-T INTERVAL: 332 MS
EKG QRS DURATION: 74 MS
EKG QTC CALCULATION (BAZETT): 465 MS
EKG R AXIS: 103 DEGREES
EKG T AXIS: -6 DEGREES
EKG VENTRICULAR RATE: 118 BPM
GFR SERPL CREATININE-BSD FRML MDRD: 26 ML/MIN/1.73
GLUCOSE BLD-MCNC: 103 MG/DL (ref 74–99)
HBA1C MFR BLD: 6.8 % (ref 4–5.6)
HCT VFR BLD CALC: 31.9 % (ref 34–48)
HEMOGLOBIN: 9 G/DL (ref 11.5–15.5)
MCH RBC QN AUTO: 26.5 PG (ref 26–35)
MCHC RBC AUTO-ENTMCNC: 28.2 % (ref 32–34.5)
MCV RBC AUTO: 94.1 FL (ref 80–99.9)
METER GLUCOSE: 143 MG/DL (ref 74–99)
METER GLUCOSE: 84 MG/DL (ref 74–99)
PDW BLD-RTO: 17.6 FL (ref 11.5–15)
PLATELET # BLD: 215 E9/L (ref 130–450)
PMV BLD AUTO: 10.6 FL (ref 7–12)
POTASSIUM SERPL-SCNC: 5.1 MMOL/L (ref 3.5–5)
PRO-BNP: ABNORMAL PG/ML (ref 0–125)
RBC # BLD: 3.39 E12/L (ref 3.5–5.5)
SODIUM BLD-SCNC: 143 MMOL/L (ref 132–146)
TROPONIN, HIGH SENSITIVITY: 89 NG/L (ref 0–9)
TROPONIN, HIGH SENSITIVITY: 92 NG/L (ref 0–9)
WBC # BLD: 6.9 E9/L (ref 4.5–11.5)

## 2022-12-19 PROCEDURE — 6360000002 HC RX W HCPCS: Performed by: EMERGENCY MEDICINE

## 2022-12-19 PROCEDURE — 2700000000 HC OXYGEN THERAPY PER DAY

## 2022-12-19 PROCEDURE — 83036 HEMOGLOBIN GLYCOSYLATED A1C: CPT

## 2022-12-19 PROCEDURE — 85027 COMPLETE CBC AUTOMATED: CPT

## 2022-12-19 PROCEDURE — 6360000002 HC RX W HCPCS: Performed by: NURSE PRACTITIONER

## 2022-12-19 PROCEDURE — 2580000003 HC RX 258: Performed by: EMERGENCY MEDICINE

## 2022-12-19 PROCEDURE — 94640 AIRWAY INHALATION TREATMENT: CPT

## 2022-12-19 PROCEDURE — 80048 BASIC METABOLIC PNL TOTAL CA: CPT

## 2022-12-19 PROCEDURE — 2580000003 HC RX 258: Performed by: NURSE PRACTITIONER

## 2022-12-19 PROCEDURE — 93005 ELECTROCARDIOGRAM TRACING: CPT | Performed by: EMERGENCY MEDICINE

## 2022-12-19 PROCEDURE — 99222 1ST HOSP IP/OBS MODERATE 55: CPT | Performed by: STUDENT IN AN ORGANIZED HEALTH CARE EDUCATION/TRAINING PROGRAM

## 2022-12-19 PROCEDURE — 71045 X-RAY EXAM CHEST 1 VIEW: CPT

## 2022-12-19 PROCEDURE — 6370000000 HC RX 637 (ALT 250 FOR IP): Performed by: EMERGENCY MEDICINE

## 2022-12-19 PROCEDURE — 1200000000 HC SEMI PRIVATE

## 2022-12-19 PROCEDURE — APPSS45 APP SPLIT SHARED TIME 31-45 MINUTES: Performed by: NURSE PRACTITIONER

## 2022-12-19 PROCEDURE — 94664 DEMO&/EVAL PT USE INHALER: CPT

## 2022-12-19 PROCEDURE — 93010 ELECTROCARDIOGRAM REPORT: CPT | Performed by: INTERNAL MEDICINE

## 2022-12-19 PROCEDURE — 83880 ASSAY OF NATRIURETIC PEPTIDE: CPT

## 2022-12-19 PROCEDURE — 99215 OFFICE O/P EST HI 40 MIN: CPT

## 2022-12-19 PROCEDURE — 2500000003 HC RX 250 WO HCPCS: Performed by: STUDENT IN AN ORGANIZED HEALTH CARE EDUCATION/TRAINING PROGRAM

## 2022-12-19 PROCEDURE — 6370000000 HC RX 637 (ALT 250 FOR IP): Performed by: STUDENT IN AN ORGANIZED HEALTH CARE EDUCATION/TRAINING PROGRAM

## 2022-12-19 PROCEDURE — 82962 GLUCOSE BLOOD TEST: CPT

## 2022-12-19 PROCEDURE — 6370000000 HC RX 637 (ALT 250 FOR IP): Performed by: NURSE PRACTITIONER

## 2022-12-19 PROCEDURE — 36415 COLL VENOUS BLD VENIPUNCTURE: CPT

## 2022-12-19 PROCEDURE — 6370000000 HC RX 637 (ALT 250 FOR IP): Performed by: INTERNAL MEDICINE

## 2022-12-19 PROCEDURE — 99285 EMERGENCY DEPT VISIT HI MDM: CPT

## 2022-12-19 PROCEDURE — 99214 OFFICE O/P EST MOD 30 MIN: CPT

## 2022-12-19 PROCEDURE — 84484 ASSAY OF TROPONIN QUANT: CPT

## 2022-12-19 RX ORDER — BUDESONIDE 0.5 MG/2ML
0.5 INHALANT ORAL 2 TIMES DAILY
Status: DISCONTINUED | OUTPATIENT
Start: 2022-12-19 | End: 2022-12-25 | Stop reason: HOSPADM

## 2022-12-19 RX ORDER — 0.9 % SODIUM CHLORIDE 0.9 %
250 INTRAVENOUS SOLUTION INTRAVENOUS ONCE
Status: COMPLETED | OUTPATIENT
Start: 2022-12-19 | End: 2022-12-19

## 2022-12-19 RX ORDER — SODIUM CHLORIDE 9 MG/ML
INJECTION, SOLUTION INTRAVENOUS PRN
Status: DISCONTINUED | OUTPATIENT
Start: 2022-12-19 | End: 2022-12-25 | Stop reason: HOSPADM

## 2022-12-19 RX ORDER — SODIUM CHLORIDE 0.9 % (FLUSH) 0.9 %
5-40 SYRINGE (ML) INJECTION PRN
Status: DISCONTINUED | OUTPATIENT
Start: 2022-12-19 | End: 2022-12-25 | Stop reason: HOSPADM

## 2022-12-19 RX ORDER — DEXTROSE MONOHYDRATE 100 MG/ML
INJECTION, SOLUTION INTRAVENOUS CONTINUOUS PRN
Status: DISCONTINUED | OUTPATIENT
Start: 2022-12-19 | End: 2022-12-25 | Stop reason: HOSPADM

## 2022-12-19 RX ORDER — ASPIRIN 81 MG/1
81 TABLET ORAL DAILY
Status: DISCONTINUED | OUTPATIENT
Start: 2022-12-20 | End: 2022-12-20

## 2022-12-19 RX ORDER — METOPROLOL TARTRATE 5 MG/5ML
5 INJECTION INTRAVENOUS ONCE
Status: DISCONTINUED | OUTPATIENT
Start: 2022-12-19 | End: 2022-12-25 | Stop reason: HOSPADM

## 2022-12-19 RX ORDER — LEVALBUTEROL INHALATION SOLUTION 0.63 MG/3ML
0.63 SOLUTION RESPIRATORY (INHALATION) EVERY 4 HOURS PRN
Status: DISCONTINUED | OUTPATIENT
Start: 2022-12-19 | End: 2022-12-25 | Stop reason: HOSPADM

## 2022-12-19 RX ORDER — ONDANSETRON 2 MG/ML
4 INJECTION INTRAMUSCULAR; INTRAVENOUS EVERY 6 HOURS PRN
Status: DISCONTINUED | OUTPATIENT
Start: 2022-12-19 | End: 2022-12-25 | Stop reason: HOSPADM

## 2022-12-19 RX ORDER — ROPINIROLE 1 MG/1
1 TABLET, FILM COATED ORAL 3 TIMES DAILY
Status: DISCONTINUED | OUTPATIENT
Start: 2022-12-19 | End: 2022-12-25 | Stop reason: HOSPADM

## 2022-12-19 RX ORDER — BUMETANIDE 2 MG/1
2 TABLET ORAL 2 TIMES DAILY
Status: ON HOLD | COMMUNITY
End: 2022-12-24 | Stop reason: HOSPADM

## 2022-12-19 RX ORDER — METOPROLOL SUCCINATE 50 MG/1
100 TABLET, EXTENDED RELEASE ORAL 2 TIMES DAILY
Status: DISCONTINUED | OUTPATIENT
Start: 2022-12-19 | End: 2022-12-20

## 2022-12-19 RX ORDER — PANTOPRAZOLE SODIUM 40 MG/1
40 TABLET, DELAYED RELEASE ORAL EVERY MORNING
Status: DISCONTINUED | OUTPATIENT
Start: 2022-12-20 | End: 2022-12-25 | Stop reason: HOSPADM

## 2022-12-19 RX ORDER — POLYETHYLENE GLYCOL 3350 17 G/17G
17 POWDER, FOR SOLUTION ORAL DAILY PRN
Status: DISCONTINUED | OUTPATIENT
Start: 2022-12-19 | End: 2022-12-25 | Stop reason: HOSPADM

## 2022-12-19 RX ORDER — ASPIRIN 81 MG/1
324 TABLET, CHEWABLE ORAL ONCE
Status: COMPLETED | OUTPATIENT
Start: 2022-12-19 | End: 2022-12-19

## 2022-12-19 RX ORDER — SODIUM CHLORIDE 0.9 % (FLUSH) 0.9 %
5-40 SYRINGE (ML) INJECTION EVERY 12 HOURS SCHEDULED
Status: DISCONTINUED | OUTPATIENT
Start: 2022-12-19 | End: 2022-12-25 | Stop reason: HOSPADM

## 2022-12-19 RX ORDER — ATORVASTATIN CALCIUM 10 MG/1
20 TABLET, FILM COATED ORAL NIGHTLY
Status: DISCONTINUED | OUTPATIENT
Start: 2022-12-19 | End: 2022-12-25 | Stop reason: HOSPADM

## 2022-12-19 RX ORDER — CARBIDOPA AND LEVODOPA 25; 100 MG/1; MG/1
2 TABLET, EXTENDED RELEASE ORAL 3 TIMES DAILY
Status: DISCONTINUED | OUTPATIENT
Start: 2022-12-19 | End: 2022-12-25 | Stop reason: HOSPADM

## 2022-12-19 RX ORDER — ARFORMOTEROL TARTRATE 15 UG/2ML
15 SOLUTION RESPIRATORY (INHALATION) 2 TIMES DAILY
Status: DISCONTINUED | OUTPATIENT
Start: 2022-12-19 | End: 2022-12-25 | Stop reason: HOSPADM

## 2022-12-19 RX ORDER — INSULIN LISPRO 100 [IU]/ML
0-4 INJECTION, SOLUTION INTRAVENOUS; SUBCUTANEOUS NIGHTLY
Status: DISCONTINUED | OUTPATIENT
Start: 2022-12-19 | End: 2022-12-25 | Stop reason: HOSPADM

## 2022-12-19 RX ORDER — INSULIN LISPRO 100 [IU]/ML
0-4 INJECTION, SOLUTION INTRAVENOUS; SUBCUTANEOUS
Status: DISCONTINUED | OUTPATIENT
Start: 2022-12-19 | End: 2022-12-25 | Stop reason: HOSPADM

## 2022-12-19 RX ORDER — MIRTAZAPINE 15 MG/1
7.5 TABLET, FILM COATED ORAL NIGHTLY
Status: DISCONTINUED | OUTPATIENT
Start: 2022-12-19 | End: 2022-12-25 | Stop reason: HOSPADM

## 2022-12-19 RX ORDER — ACETAMINOPHEN 650 MG/1
650 SUPPOSITORY RECTAL EVERY 6 HOURS PRN
Status: DISCONTINUED | OUTPATIENT
Start: 2022-12-19 | End: 2022-12-25 | Stop reason: HOSPADM

## 2022-12-19 RX ORDER — ONDANSETRON 4 MG/1
4 TABLET, ORALLY DISINTEGRATING ORAL EVERY 8 HOURS PRN
Status: DISCONTINUED | OUTPATIENT
Start: 2022-12-19 | End: 2022-12-25 | Stop reason: HOSPADM

## 2022-12-19 RX ORDER — INSULIN GLARGINE 100 [IU]/ML
13 INJECTION, SOLUTION SUBCUTANEOUS 2 TIMES DAILY
Status: DISCONTINUED | OUTPATIENT
Start: 2022-12-19 | End: 2022-12-25 | Stop reason: HOSPADM

## 2022-12-19 RX ORDER — SUCRALFATE 1 G/1
1 TABLET ORAL 4 TIMES DAILY
Status: DISCONTINUED | OUTPATIENT
Start: 2022-12-19 | End: 2022-12-25 | Stop reason: HOSPADM

## 2022-12-19 RX ORDER — LORAZEPAM 0.5 MG/1
0.5 TABLET ORAL ONCE
Status: COMPLETED | OUTPATIENT
Start: 2022-12-19 | End: 2022-12-19

## 2022-12-19 RX ORDER — FUROSEMIDE 10 MG/ML
40 INJECTION INTRAMUSCULAR; INTRAVENOUS ONCE
Status: COMPLETED | OUTPATIENT
Start: 2022-12-19 | End: 2022-12-19

## 2022-12-19 RX ORDER — ACETAMINOPHEN 325 MG/1
650 TABLET ORAL EVERY 6 HOURS PRN
Status: DISCONTINUED | OUTPATIENT
Start: 2022-12-19 | End: 2022-12-25 | Stop reason: HOSPADM

## 2022-12-19 RX ADMIN — LORAZEPAM 0.5 MG: 0.5 TABLET ORAL at 22:30

## 2022-12-19 RX ADMIN — ASPIRIN 324 MG: 81 TABLET, CHEWABLE ORAL at 09:24

## 2022-12-19 RX ADMIN — Medication 7 ML: at 20:53

## 2022-12-19 RX ADMIN — LEVALBUTEROL 0.63 MG: 0.63 SOLUTION RESPIRATORY (INHALATION) at 15:36

## 2022-12-19 RX ADMIN — ATORVASTATIN CALCIUM 20 MG: 10 TABLET, FILM COATED ORAL at 20:42

## 2022-12-19 RX ADMIN — INSULIN GLARGINE 13 UNITS: 100 INJECTION, SOLUTION SUBCUTANEOUS at 20:49

## 2022-12-19 RX ADMIN — FUROSEMIDE 40 MG: 10 INJECTION, SOLUTION INTRAMUSCULAR; INTRAVENOUS at 13:03

## 2022-12-19 RX ADMIN — BUDESONIDE 500 MCG: 0.5 SUSPENSION RESPIRATORY (INHALATION) at 15:35

## 2022-12-19 RX ADMIN — SODIUM CHLORIDE 250 ML: 9 INJECTION, SOLUTION INTRAVENOUS at 13:04

## 2022-12-19 RX ADMIN — ROPINIROLE HYDROCHLORIDE 1 MG: 1 TABLET, FILM COATED ORAL at 17:11

## 2022-12-19 RX ADMIN — METOPROLOL SUCCINATE 100 MG: 50 TABLET, EXTENDED RELEASE ORAL at 20:42

## 2022-12-19 RX ADMIN — ANTI-FUNGAL POWDER MICONAZOLE NITRATE TALC FREE 1 EACH: 1.42 POWDER TOPICAL at 20:52

## 2022-12-19 RX ADMIN — ARFORMOTEROL TARTRATE 15 MCG: 15 SOLUTION RESPIRATORY (INHALATION) at 15:35

## 2022-12-19 RX ADMIN — SUCRALFATE 1 G: 1 TABLET ORAL at 20:43

## 2022-12-19 RX ADMIN — CARBIDOPA AND LEVODOPA 2 TABLET: 25; 100 TABLET, EXTENDED RELEASE ORAL at 21:08

## 2022-12-19 RX ADMIN — MIRTAZAPINE 7.5 MG: 15 TABLET, FILM COATED ORAL at 20:42

## 2022-12-19 RX ADMIN — CARBIDOPA AND LEVODOPA 2 TABLET: 25; 100 TABLET, EXTENDED RELEASE ORAL at 17:11

## 2022-12-19 RX ADMIN — ROPINIROLE HYDROCHLORIDE 1 MG: 1 TABLET, FILM COATED ORAL at 20:43

## 2022-12-19 ASSESSMENT — ENCOUNTER SYMPTOMS
DIARRHEA: 0
BLOOD IN STOOL: 0
SPUTUM PRODUCTION: 0
SWOLLEN GLANDS: 0
CONSTIPATION: 0
EYES NEGATIVE: 1
SHORTNESS OF BREATH: 1
COUGH: 0
WHEEZING: 0
VOMITING: 0
CHEST TIGHTNESS: 0
NAUSEA: 0
ABDOMINAL PAIN: 0

## 2022-12-19 NOTE — LETTER
[place] [employer] Medicaid  CERTIFICATION OF NECESSITY  FOR NON-EMERGENCY TRANSPORTATION   BY GROUND AMBULANCE      Individual Information   1. Name: Dena Severin 2. [place] Medicaid Billing Number:      3. Address: [place] [address]      Transportation Provider Information   4. Provider Name:   [facility]    5. [place] Medicaid Provider Number:  National Provider Identifier (NPI):      Certification  7. Criteria:  During transport, this individual requires:  [x] Medical treatment or continuous     supervision by an EMT. [] The administration or regulation of oxygen by another person. [] Supervised protective restraint. 8. Period Beginning Date: 12/24/22   9. Length  [x] Not more than 1 day(s)  [] One Year     Additional Information Relevant to Certification   10. Comments or Explanations, If Necessary or Appropriate     CHF, Parkinsons, unable to stand without assistance, seizure precautions      Certifying Practitioner Information   11. Name of Practitioner:  Dr Bere Martins    12. [place] Medicaid Provider Number, If Applicable:  [address] Provider Identifier (NPI):      Signature Information   14. Date of Signature: 12/24/22  15. Name of Person Signing: Cesar SANTANA    16.  Signature and Professional Designation: Electronically signed by ESTHER Castellano on 12/24/2022 at 11:42 AM     ODM [id number]  Rev. 7/2015

## 2022-12-19 NOTE — PROGRESS NOTES
Patient presented to CHF clinic follow up appt. A fib RVR on telemetry, weight gain, edematous despite medication changes on outpatient basis. Spoke with NP for cardiology Vikki MORALEZ CNP who request patient go to ED for further evaluation. 20 lb weight gain since appointment on 12/1/22. Patient agreeable for ED evaluation. Call placed to facility and daughter Jenise Abel to inform.     Electronically signed by Anmol Francisco RN on 12/19/2022 at 8:57 AM

## 2022-12-19 NOTE — ED NOTES
Attempted to call report to 5th floor. States they will have to call back.      Kathrine Valentine RN  12/19/22 1587

## 2022-12-19 NOTE — ED PROVIDER NOTES
Patient presents to the ED from SNF with complaint of worsening LE edema and orthopnea. No CP, cough or fevers. She states there is some discomfort to the lower legs with the increased swelling. She is a poor historian with little knowledge of her medical conditions or medications. The history is provided by the patient. Shortness of Breath  Severity:  Mild  Onset quality:  Gradual  Duration:  2 days  Timing:  Intermittent  Progression:  Waxing and waning  Chronicity:  New  Relieved by:  Nothing  Worsened by: Activity (lying flat)  Ineffective treatments:  Oxygen  Associated symptoms: no abdominal pain, no chest pain, no claudication, no cough, no diaphoresis, no fever, no headaches, no sputum production, no swollen glands, no vomiting and no wheezing    Risk factors: obesity and prolonged immobilization    Risk factors: no hx of PE/DVT      Review of Systems   Constitutional:  Positive for activity change, appetite change and fatigue. Negative for chills, diaphoresis and fever. HENT: Negative. Eyes: Negative. Respiratory:  Positive for shortness of breath. Negative for cough, sputum production, chest tightness and wheezing. Cardiovascular:  Positive for leg swelling. Negative for chest pain and claudication. Gastrointestinal:  Negative for abdominal pain, blood in stool, constipation, diarrhea, nausea and vomiting. Genitourinary:  Negative for flank pain. Musculoskeletal: Negative. Skin: Negative. Neurological:  Negative for weakness, light-headedness and headaches. Hematological: Negative. Psychiatric/Behavioral: Negative. Physical Exam  Vitals and nursing note reviewed. Constitutional:       General: She is not in acute distress. Appearance: She is well-developed. She is obese. She is not ill-appearing, toxic-appearing or diaphoretic. HENT:      Head: Normocephalic and atraumatic. Nose: Nose normal. No congestion.       Mouth/Throat:      Mouth: Mucous membranes are moist.      Pharynx: Oropharynx is clear. Eyes:      Extraocular Movements: Extraocular movements intact. Conjunctiva/sclera: Conjunctivae normal.      Pupils: Pupils are equal, round, and reactive to light. Cardiovascular:      Rate and Rhythm: Tachycardia present. Rhythm irregular. Pulses: Normal pulses. Heart sounds: Normal heart sounds. No murmur heard. Pulmonary:      Effort: Pulmonary effort is normal. No respiratory distress. Breath sounds: Normal breath sounds. No wheezing or rales. Chest:      Chest wall: No tenderness. Abdominal:      General: Abdomen is flat. Bowel sounds are normal.      Palpations: Abdomen is soft. Tenderness: There is no abdominal tenderness. There is no right CVA tenderness, left CVA tenderness, guarding or rebound. Musculoskeletal:         General: Swelling present. Normal range of motion. Cervical back: Normal range of motion and neck supple. No rigidity or tenderness. Right lower leg: Edema present. Left lower leg: Edema present. Comments: 3+ b/l lower extremity edema to the midthighs   Lymphadenopathy:      Cervical: No cervical adenopathy. Skin:     General: Skin is warm and dry. Findings: Erythema (mild lower extremity b/l) present. Neurological:      General: No focal deficit present. Mental Status: She is alert and oriented to person, place, and time. Mental status is at baseline. Cranial Nerves: No cranial nerve deficit. Coordination: Coordination normal.   Psychiatric:         Mood and Affect: Mood normal.         Behavior: Behavior normal.         Thought Content: Thought content normal.         Judgment: Judgment normal.       Procedures    [facility]      ED Course as of 12/19/22 1142   Mon Dec 19, 2022   1141 I have spoken to Dr. MARTINEZ [facility]. At this time I will give the patient a small bolus of IV fluids as well as Lasix 40 mg.   She will need to be admitted to the hospital for further treatment and evaluation. [JS]      ED Course User Index  [JS] Maeellen Castañeda DO     EKG Interpretation    Interpreted by emergency department physician    Rhythm: atrial fibrillation - rapid  Rate: 110-120  Axis: right  Ectopy: none  Conduction: normal  ST Segments: normal  T Waves: normal  Q Waves: none    Clinical Impression: atrial fibrillation (chronic) with no change when compared to previous. Maeellen Castañeda DO    9:50 AM  Will give dose of IV Lopressor for a fib RVR  ED Course as of 12/19/22 1142   Mon Dec 19, 2022   1141 I have spoken to Dr. Yaakov Mott. At this time I will give the patient a small bolus of IV fluids as well as Lasix 40 mg. She will need to be admitted to the hospital for further treatment and evaluation. [JS]      ED Course User Index  [JS] Mae DO Garry       --------------------------------------------- PAST HISTORY ---------------------------------------------  Past Medical History:  has a past medical history of A-fib ([address] Acute on chronic congestive heart failure ([address] Anxiety, Asthma, CAD (coronary artery disease), Cancer ([address] Chronic kidney disease, COPD exacerbation ([address] Depression, Diabetes mellitus ([address] H/O mammogram, Hx MRSA infection, Hyperlipidemia, Hypertension, Lateral epicondylitis, Morbid obesity ([address] SERENA on CPAP, Oxygen dependent, Parkinson's disease ([address] and Tubal ligation status. Past Surgical History:  has a past surgical history that includes Gallbladder surgery; Toe amputation; Cholecystectomy; Colonoscopy (7/29/15); Endoscopy, colon, diagnostic (7/19/15); Upper gastrointestinal endoscopy; lumbar laminectomy; Tonsillectomy; Breast lumpectomy; Breast reduction surgery; Cardiac catheterization (4/28/2014); Cardiac catheterization (01/11/2022); CT GUIDED CHEST TUBE (7/8/2022); and Upper gastrointestinal endoscopy (N/A, 7/19/2022). Social History:  reports that she has never smoked.  She has never used smokeless tobacco. She reports that she does not drink alcohol and does not use drugs. Family History: family history includes Cancer in her father and mother; Diabetes in her sister; Heart Disease in her sister; Other in her brother; Seizures in her son. The patients home medications have been reviewed.     Allergies: Ciprofloxacin and Codeine    -------------------------------------------------- RESULTS -------------------------------------------------    Lab  Results for orders placed or performed during the hospital encounter of 94/25/51   Basic metabolic panel   Result Value Ref Range    Sodium 143 132 - 146 mmol/L    Potassium 5.1 (H) 3.5 - 5.0 mmol/L    Chloride 99 98 - 107 mmol/L    CO2 37 (H) 22 - 29 mmol/L    Anion Gap 7 7 - 16 mmol/L    Glucose 103 (H) 74 - 99 mg/dL    BUN 50 (H) 6 - 23 mg/dL    Creatinine 2.0 (H) 0.5 - 1.0 mg/dL    Est, Glom Filt Rate 26 >=60 mL/min/1.73    Calcium 9.1 8.6 - 10.2 mg/dL   CBC   Result Value Ref Range    WBC 6.9 4.5 - 11.5 E9/L    RBC 3.39 (L) 3.50 - 5.50 E12/L    Hemoglobin 9.0 (L) 11.5 - 15.5 g/dL    Hematocrit 31.9 (L) 34.0 - 48.0 %    MCV 94.1 80.0 - 99.9 fL    MCH 26.5 26.0 - 35.0 pg    MCHC 28.2 (L) 32.0 - 34.5 %    RDW 17.6 (H) 11.5 - 15.0 fL    Platelets 884 506 - 927 E9/L    MPV 10.6 7.0 - 12.0 fL   Troponin   Result Value Ref Range    Troponin, High Sensitivity 89 (H) 0 - 9 ng/L   Brain Natriuretic Peptide   Result Value Ref Range    Pro-BNP 15,566 (H) 0 - 125 pg/mL   EKG 12 Lead   Result Value Ref Range    Ventricular Rate 118 BPM    Atrial Rate 113 BPM    QRS Duration 74 ms    Q-T Interval 332 ms    QTc Calculation (Bazett) 465 ms    R Axis 103 degrees    T Axis -6 degrees       Radiology  XR CHEST PORTABLE   Final Result   Diminished right pleural effusion with diminished adjacent atelectasis and or   infiltrate.           ------------------------- NURSING NOTES AND VITALS REVIEWED ---------------------------  Date / Time Roomed:  12/19/2022  8:48 AM  ED Bed Assignment:  02/02    The nursing notes within the ED encounter and vital signs as below have been reviewed. Patient Vitals for the past 24 hrs:   BP Temp Temp src Pulse Resp SpO2 Height Weight   12/19/22 1025 101/62 97.6 °F (36.4 °C) Oral 93 16 98 % -- --   12/19/22 0926 134/74 -- Oral 93 17 96 % -- --   12/19/22 0840 109/68 96.9 °F (36.1 °C) Infrared (!) 126 18 96 % 5' (1.524 m) 256 lb (116.1 kg)       Oxygen Saturation Interpretation: Normal      ------------------------------------------ PROGRESS NOTES ------------------------------------------  Re-evaluation(s):  Time: 11:40 AM.  Patients symptoms show no change  Repeat physical examination is not changed  Rate control with Lopressor held secondary to patient's hypotension. IV fluids will be provided prior to giving Lasix to help diurese and maintain blood pressure. I have spoken with the patient and discussed todays results, in addition to providing specific details for the plan of care and counseling regarding the diagnosis and prognosis. Their questions are answered at this time and they are agreeable with the plan.      --------------------------------- ADDITIONAL PROVIDER NOTES ---------------------------------  Consultations:  Spoke with Alyce Goldstein and Katty Christopher,  They will admit this patient. This patient's ED course included: a personal history and physicial examination, multiple bedside re-evaluations, IV medications, cardiac monitoring, and continuous pulse oximetry    This patient has remained hemodynamically stable during their ED course. Please note that the withdrawal or failure to initiate urgent interventions for this patient would likely result in a life threatening deterioration or permanent disability. Accordingly this patient received 30 minutes of critical care time, excluding separately billable procedures. Systems at risk for deterioration include: cardiopulmonary. Clinical Impression  1.  Acute renal insufficiency    2. Acute on chronic combined systolic and diastolic congestive heart failure ([address]    3. Anemia, unspecified type          Disposition  Patient's disposition: Admit to telemetry  Patient's condition is stable.        [address], DO  12/19/22 224 [address],   12/19/22 1148

## 2022-12-19 NOTE — ED NOTES
Dr. Theodore Zamorano aware of current vitals. Will monitor pt HR/BP to determine if Lopressor is needed.  Held for the moment per Dr. Lucia Evans, RN  12/19/22 4862

## 2022-12-20 PROBLEM — I95.9 HYPOTENSION: Status: ACTIVE | Noted: 2022-12-20

## 2022-12-20 LAB
ALBUMIN SERPL-MCNC: 2.9 G/DL (ref 3.5–5.2)
ALP BLD-CCNC: 95 U/L (ref 35–104)
ALT SERPL-CCNC: <5 U/L (ref 0–32)
ANION GAP SERPL CALCULATED.3IONS-SCNC: 8 MMOL/L (ref 7–16)
ANISOCYTOSIS: ABNORMAL
AST SERPL-CCNC: 9 U/L (ref 0–31)
BASOPHILS ABSOLUTE: 0.05 E9/L (ref 0–0.2)
BASOPHILS RELATIVE PERCENT: 0.9 % (ref 0–2)
BILIRUB SERPL-MCNC: 0.3 MG/DL (ref 0–1.2)
BUN BLDV-MCNC: 50 MG/DL (ref 6–23)
CALCIUM SERPL-MCNC: 8.6 MG/DL (ref 8.6–10.2)
CHLORIDE BLD-SCNC: 100 MMOL/L (ref 98–107)
CO2: 32 MMOL/L (ref 22–29)
CREAT SERPL-MCNC: 1.8 MG/DL (ref 0.5–1)
EOSINOPHILS ABSOLUTE: 0.05 E9/L (ref 0.05–0.5)
EOSINOPHILS RELATIVE PERCENT: 0.9 % (ref 0–6)
GFR SERPL CREATININE-BSD FRML MDRD: 29 ML/MIN/1.73
GLUCOSE BLD-MCNC: 98 MG/DL (ref 74–99)
HCT VFR BLD CALC: 28 % (ref 34–48)
HEMOGLOBIN: 8 G/DL (ref 11.5–15.5)
HYPOCHROMIA: ABNORMAL
LYMPHOCYTES ABSOLUTE: 1.12 E9/L (ref 1.5–4)
LYMPHOCYTES RELATIVE PERCENT: 21.7 % (ref 20–42)
MCH RBC QN AUTO: 26.7 PG (ref 26–35)
MCHC RBC AUTO-ENTMCNC: 28.6 % (ref 32–34.5)
MCV RBC AUTO: 93.3 FL (ref 80–99.9)
METER GLUCOSE: 101 MG/DL (ref 74–99)
METER GLUCOSE: 146 MG/DL (ref 74–99)
METER GLUCOSE: 167 MG/DL (ref 74–99)
METER GLUCOSE: 196 MG/DL (ref 74–99)
MONOCYTES ABSOLUTE: 0.36 E9/L (ref 0.1–0.95)
MONOCYTES RELATIVE PERCENT: 7 % (ref 2–12)
NEUTROPHILS ABSOLUTE: 3.57 E9/L (ref 1.8–7.3)
NEUTROPHILS RELATIVE PERCENT: 69.6 % (ref 43–80)
OVALOCYTES: ABNORMAL
PDW BLD-RTO: 17.9 FL (ref 11.5–15)
PLATELET # BLD: 172 E9/L (ref 130–450)
PMV BLD AUTO: 10.4 FL (ref 7–12)
POIKILOCYTES: ABNORMAL
POLYCHROMASIA: ABNORMAL
POTASSIUM REFLEX MAGNESIUM: 4.9 MMOL/L (ref 3.5–5)
RBC # BLD: 3 E12/L (ref 3.5–5.5)
SODIUM BLD-SCNC: 140 MMOL/L (ref 132–146)
TEAR DROP CELLS: ABNORMAL
TOTAL PROTEIN: 4.8 G/DL (ref 6.4–8.3)
WBC # BLD: 5.1 E9/L (ref 4.5–11.5)

## 2022-12-20 PROCEDURE — 99222 1ST HOSP IP/OBS MODERATE 55: CPT | Performed by: INTERNAL MEDICINE

## 2022-12-20 PROCEDURE — 6360000002 HC RX W HCPCS: Performed by: NURSE PRACTITIONER

## 2022-12-20 PROCEDURE — 2580000003 HC RX 258: Performed by: NURSE PRACTITIONER

## 2022-12-20 PROCEDURE — 85025 COMPLETE CBC W/AUTO DIFF WBC: CPT

## 2022-12-20 PROCEDURE — 94640 AIRWAY INHALATION TREATMENT: CPT

## 2022-12-20 PROCEDURE — 36415 COLL VENOUS BLD VENIPUNCTURE: CPT

## 2022-12-20 PROCEDURE — 2580000003 HC RX 258: Performed by: STUDENT IN AN ORGANIZED HEALTH CARE EDUCATION/TRAINING PROGRAM

## 2022-12-20 PROCEDURE — 6370000000 HC RX 637 (ALT 250 FOR IP): Performed by: INTERNAL MEDICINE

## 2022-12-20 PROCEDURE — 82962 GLUCOSE BLOOD TEST: CPT

## 2022-12-20 PROCEDURE — 6370000000 HC RX 637 (ALT 250 FOR IP): Performed by: STUDENT IN AN ORGANIZED HEALTH CARE EDUCATION/TRAINING PROGRAM

## 2022-12-20 PROCEDURE — 1200000000 HC SEMI PRIVATE

## 2022-12-20 PROCEDURE — 94660 CPAP INITIATION&MGMT: CPT

## 2022-12-20 PROCEDURE — 80053 COMPREHEN METABOLIC PANEL: CPT

## 2022-12-20 PROCEDURE — 2700000000 HC OXYGEN THERAPY PER DAY

## 2022-12-20 PROCEDURE — APPSS30 APP SPLIT SHARED TIME 16-30 MINUTES: Performed by: NURSE PRACTITIONER

## 2022-12-20 PROCEDURE — 6370000000 HC RX 637 (ALT 250 FOR IP): Performed by: NURSE PRACTITIONER

## 2022-12-20 RX ORDER — 0.9 % SODIUM CHLORIDE 0.9 %
250 INTRAVENOUS SOLUTION INTRAVENOUS ONCE
Status: COMPLETED | OUTPATIENT
Start: 2022-12-20 | End: 2022-12-20

## 2022-12-20 RX ORDER — MIDODRINE HYDROCHLORIDE 2.5 MG/1
2.5 TABLET ORAL
Status: DISCONTINUED | OUTPATIENT
Start: 2022-12-20 | End: 2022-12-22

## 2022-12-20 RX ORDER — LORAZEPAM 0.5 MG/1
0.5 TABLET ORAL ONCE
Status: COMPLETED | OUTPATIENT
Start: 2022-12-20 | End: 2022-12-20

## 2022-12-20 RX ORDER — METOPROLOL SUCCINATE 25 MG/1
25 TABLET, EXTENDED RELEASE ORAL DAILY
Status: DISCONTINUED | OUTPATIENT
Start: 2022-12-20 | End: 2022-12-25 | Stop reason: HOSPADM

## 2022-12-20 RX ORDER — FUROSEMIDE 10 MG/ML
40 INJECTION INTRAMUSCULAR; INTRAVENOUS DAILY
Status: DISCONTINUED | OUTPATIENT
Start: 2022-12-21 | End: 2022-12-21

## 2022-12-20 RX ADMIN — LEVALBUTEROL 0.63 MG: 0.63 SOLUTION RESPIRATORY (INHALATION) at 14:25

## 2022-12-20 RX ADMIN — LORAZEPAM 0.5 MG: 0.5 TABLET ORAL at 22:01

## 2022-12-20 RX ADMIN — ROPINIROLE HYDROCHLORIDE 1 MG: 1 TABLET, FILM COATED ORAL at 20:32

## 2022-12-20 RX ADMIN — MIDODRINE HYDROCHLORIDE 2.5 MG: 2.5 TABLET ORAL at 17:50

## 2022-12-20 RX ADMIN — SUCRALFATE 1 G: 1 TABLET ORAL at 08:51

## 2022-12-20 RX ADMIN — ACETAMINOPHEN 650 MG: 325 TABLET ORAL at 01:45

## 2022-12-20 RX ADMIN — ATORVASTATIN CALCIUM 20 MG: 10 TABLET, FILM COATED ORAL at 20:32

## 2022-12-20 RX ADMIN — INSULIN GLARGINE 13 UNITS: 100 INJECTION, SOLUTION SUBCUTANEOUS at 20:35

## 2022-12-20 RX ADMIN — SUCRALFATE 1 G: 1 TABLET ORAL at 17:50

## 2022-12-20 RX ADMIN — Medication 7 ML: at 20:47

## 2022-12-20 RX ADMIN — Medication 5 ML: at 08:00

## 2022-12-20 RX ADMIN — SODIUM CHLORIDE 250 ML: 9 INJECTION, SOLUTION INTRAVENOUS at 10:52

## 2022-12-20 RX ADMIN — MIDODRINE HYDROCHLORIDE 2.5 MG: 2.5 TABLET ORAL at 13:10

## 2022-12-20 RX ADMIN — BUDESONIDE 500 MCG: 0.5 SUSPENSION RESPIRATORY (INHALATION) at 14:25

## 2022-12-20 RX ADMIN — ROPINIROLE HYDROCHLORIDE 1 MG: 1 TABLET, FILM COATED ORAL at 13:37

## 2022-12-20 RX ADMIN — APIXABAN 5 MG: 5 TABLET, FILM COATED ORAL at 20:36

## 2022-12-20 RX ADMIN — CARBIDOPA AND LEVODOPA 2 TABLET: 25; 100 TABLET, EXTENDED RELEASE ORAL at 20:32

## 2022-12-20 RX ADMIN — ROPINIROLE HYDROCHLORIDE 1 MG: 1 TABLET, FILM COATED ORAL at 08:52

## 2022-12-20 RX ADMIN — ANTI-FUNGAL POWDER MICONAZOLE NITRATE TALC FREE 1 EACH: 1.42 POWDER TOPICAL at 09:00

## 2022-12-20 RX ADMIN — MIRTAZAPINE 7.5 MG: 15 TABLET, FILM COATED ORAL at 20:32

## 2022-12-20 RX ADMIN — ANTI-FUNGAL POWDER MICONAZOLE NITRATE TALC FREE 1 EACH: 1.42 POWDER TOPICAL at 20:47

## 2022-12-20 RX ADMIN — BUDESONIDE 500 MCG: 0.5 SUSPENSION RESPIRATORY (INHALATION) at 06:30

## 2022-12-20 RX ADMIN — ARFORMOTEROL TARTRATE 15 MCG: 15 SOLUTION RESPIRATORY (INHALATION) at 06:30

## 2022-12-20 RX ADMIN — ASPIRIN 81 MG: 81 TABLET, COATED ORAL at 08:51

## 2022-12-20 RX ADMIN — ARFORMOTEROL TARTRATE 15 MCG: 15 SOLUTION RESPIRATORY (INHALATION) at 14:25

## 2022-12-20 RX ADMIN — CARBIDOPA AND LEVODOPA 2 TABLET: 25; 100 TABLET, EXTENDED RELEASE ORAL at 13:13

## 2022-12-20 RX ADMIN — SUCRALFATE 1 G: 1 TABLET ORAL at 13:10

## 2022-12-20 RX ADMIN — SUCRALFATE 1 G: 1 TABLET ORAL at 20:31

## 2022-12-20 RX ADMIN — PANTOPRAZOLE SODIUM 40 MG: 40 TABLET, DELAYED RELEASE ORAL at 08:52

## 2022-12-20 RX ADMIN — CARBIDOPA AND LEVODOPA 2 TABLET: 25; 100 TABLET, EXTENDED RELEASE ORAL at 08:51

## 2022-12-20 RX ADMIN — INSULIN GLARGINE 13 UNITS: 100 INJECTION, SOLUTION SUBCUTANEOUS at 09:00

## 2022-12-20 ASSESSMENT — PAIN SCALES - GENERAL
PAINLEVEL_OUTOF10: 0
PAINLEVEL_OUTOF10: 8

## 2022-12-20 ASSESSMENT — PAIN DESCRIPTION - LOCATION: LOCATION: BACK

## 2022-12-20 NOTE — PROGRESS NOTES
[address] [place], [place] [address]ist Progress Note    Admitting Date and Time: 12/19/2022  8:48 AM  Admit Dx: Heart failure (HCC) [I50.9]  Acute renal insufficiency [N28.9]  Acute on chronic combined systolic and diastolic congestive heart failure ([address] [I50.43]  Anemia, unspecified type [D64.9]    Subjective:  12/20: Pt received 250ml IVF bolus this am for asymptomatic hypotension (78/41). States she slept ok. Denies CP, SOB. No needs at this time. PMH:  has a past medical history of A-fib ([address] Acute on chronic congestive heart failure ([facility] Anxiety, Asthma, CAD (coronary artery disease), Cancer ([address] Chronic kidney disease, COPD exacerbation ([address] Depression, Diabetes mellitus ([address] H/O mammogram, Hx MRSA infection, Hyperlipidemia, Hypertension, Lateral epicondylitis, Morbid obesity ([address] SERENA on CPAP, Oxygen dependent, Parkinson's disease ([address] and Tubal ligation status. REVIEW OF SYSTEMS:  no fevers, chills, cp, sob, n/v, ha, vision/hearing changes, wt changes, hot/cold flashes, other open skin lesions, diarrhea, constipation, dysuria/hematuria unless noted in HPI. Complete ROS performed with the patient and is otherwise negative. Objective:    BP (!) 78/41   Pulse (!) 105   Temp 98.3 °F (36.8 °C) (Oral)   Resp 18   Ht 5' (1.524 m)   Wt 256 lb (116.1 kg)   SpO2 99%   BMI 50.00 kg/m²     PHYSICAL EXAM  General Appearance: alert and oriented to person, place and time and in no acute distress  Skin: warm and dry  Head: normocephalic and atraumatic  Eyes: pupils equal, round, and reactive to light, extraocular eye movements intact, conjunctivae normal  Neck: neck supple and non tender without mass   Pulmonary/Chest: clear to auscultation bilaterally- no wheezes, rales or rhonchi, normal air movement, no respiratory distress.  Currently on baseline O2 2L NC  Cardiovascular: normal rate, normal S1 and S2 and no carotid bruits  Abdomen: soft, round, non-tender, non-distended, normal bowel sounds, no masses or organomegaly  Extremities: no cyanosis, no clubbing. 2+ pitting edema to bilat LE's  Neurologic: no cranial nerve deficit and speech normal    Assessment & Plan:    HFpEF with exacerbation 7/2022 ECHO with EF of 70-75%, indeterminate diastolic dysfunction  Initial BNP >15k. Tight, pitting edema in bilat LE's through knees. Anasarca to abd. Received lasix 40mg IV in ER. 1800ml FR     A-fib RVR initial rate 120's-160's  Rate has improved, 80's-low 100's overnight. Cardiology reduced home Toprol XL from 100mg BID to 25mg daily. Continue home asa. No other anticoagulants listed in home meds, appreciate cardiology recommendations. Continuous telemetry. Hypotension  Received 250ml IVF bolus this am for asymptomatic hypotension. Cardiology added midodrine 2.5mg TID. Chronic anemia Initial Hgb 9.0-at/near baseline  Hgb today 8.0. Monitor     DMII A1c 6.8  Continue home Lantus. AC, HS BS checks with low-dose SSI coverage. Hypoglycemia protocol. 4 carb diet     SHANTANU on CKD Initial Cr 2.1 (baseline 1.4-1.7)  Cr 1.8 today. Received 250ml IVF bolus in ER & lasix 40mg IV x1 dose last night. Received additional 250ml IVF bolus this am for hypotension. Elevated troponin Initial 89  Repeat troponin pending. Pt denies chest pain. Cardiology consulted. Hyperkalemia Initial 5.1  Did not treat as pt received lasix IV in ER. Serum K+ today 4.9. Monitor CMP. COPD with chronic hypoxia Baseline home O2 2-3L  [place], Pulmicort prn. Xopenex q4h prn (currently RVR). Oxygen therapy protocol. SERENA on Cpap  HLD 5/2022 Lipid panel WNL  Home bipap  Continue home statin        Code Status: Full Code  DVT prophylaxis:      30 minutes or more spent reviewing patient chart, assessing patient, discussing plan of care with patient and family, discussing plan of care with collaborating physician, and documentation. NOTE: This report was transcribed using voice recognition software.  Every effort was made to ensure accuracy; however, inadvertent computerized transcription errors may be present.   Electronically signed by RASHARD Vazquez NP on 12/20/2022 at 12:09 PM

## 2022-12-20 NOTE — PROGRESS NOTES
NIV machine in room. Patient stated she was not ready to go on unit. Told patient respiratory will back to check on her again shortly.

## 2022-12-20 NOTE — DISCHARGE INSTR - COC
Continuity of Care Form    Patient Name: Wes Santillan   :    MRN:  [id number]    Admit date:  2022  Discharge date:  ***    Code Status Order: Full Code   Advance Directives:     Admitting Physician:  Stan Almonte MD  PCP: Jocelyn Curiel DO    Discharging Nurse: Floyd Venegas RN   [address] Unit/Room#: 3297/9061-81  Discharging Unit Phone Number: [phone]    Emergency Contact:   Extended Emergency Contact Information  Primary Emergency Contact: Hakeem Holbrook [address] Phone: [phone]  Mobile Phone: [phone]  Relation: Child   needed? No  Secondary Emergency Contact: Lilliana Pedroza  [place] Phone: [phone]  Mobile Phone: [phone]  Relation: Child   needed?  No    Past Surgical History:  Past Surgical History:   Procedure Laterality Date    BREAST LUMPECTOMY      BREAST REDUCTION SURGERY      CARDIAC CATHETERIZATION  2014    Dr. Lazarus Belt  2022    Dr. Liu Moe  7/29/15    CT GUIDED CHEST TUBE  2022    CT GUIDED CHEST TUBE 2022 Andreia Cheung MD [facility]    ENDOSCOPY, COLON, DIAGNOSTIC  7/19/15    GALLBLADDER SURGERY      LUMBAR LAMINECTOMY      TOE AMPUTATION      TONSILLECTOMY      UPPER GASTROINTESTINAL ENDOSCOPY      UPPER GASTROINTESTINAL ENDOSCOPY N/A 2022    EGD ESOPHAGOGASTRODUODENOSCOPY performed by Nikkie Kim MD at [address]       Immunization History:   Immunization History   Administered Date(s) Administered    COVID-19, PFIZER GRAY top, DO NOT Dilute, (age 15 y+), IM, 30 mcg/0.3 mL 2022    Influenza Vaccine, unspecified formulation 10/15/2014    Influenza Virus Vaccine 10/31/2008, 10/05/2011    Influenza, High Dose (Fluzone 65 yrs and older) 2015, 2016, 2017, 2019    Influenza, Triv, inactivated, subunit, adjuvanted, IM (Fluad 65 yrs and older) 2019    Pneumococcal Conjugate 13-valent ([id number]) 05/27/2016    Pneumococcal Polysaccharide ([id number]) 12/21/2012, 01/29/2018    Td, unspecified formulation 03/11/2014    Tdap (Boostrix, Adacel) 09/30/2015    Zoster Live (Zostavax) 06/25/2013    Zoster Recombinant (Shingrix) 12/10/2019       Active Problems:  Patient Active Problem List   Diagnosis Code    Depression with anxiety F41.8    Osteoporosis M81.0    Asthma J45.909    Hyperlipidemia E78.5    Mitral regurgitation I34.0    Obstructive sleep apnea syndrome G47.33    Psoriasis L40.9    Diabetes mellitus (HCC) E11.9    Parkinson's disease ([facility] G20    Primary hypertension I10    Microalbuminuria R80.9    Morbid obesity ([facility] E66.01    RLS (restless legs syndrome) G25.81    Generalized weakness R53.1    Inability to walk R26.2    Hypothyroidism E03.9    Chest pain R07.9    Acute asthma exacerbation J45.901    Asthma exacerbation, mild J45.901    Paroxysmal atrial fibrillation (HCC) I48.0    Atrial fibrillation with rapid ventricular response (HCC) I48.91    Acute on chronic congestive heart failure (HCC) I50.9    Coronary artery disease involving native coronary artery of native heart without angina pectoris I25.10    Dysphagia R13.10    Hepatosplenomegaly R16.2    Acute decompensated heart failure (HCC) X50.8    Acute diastolic (congestive) heart failure (HCC) I50.31    Nonrheumatic tricuspid valve regurgitation I36.1    Tobacco abuse Z72.0    Recurrent syncope R55    Septic shock (HCC) A41.9, R65.21    Seizure-like activity (HCC) R56.9    Acute kidney injury ([facility] N17.9    Pancytopenia (HCC) D61.818    Thrombocytopenia (HCC) D69.6    Encephalopathy G93.40    Acute respiratory failure with hypoxia (HCC) J96.01    Lactic acidosis E87.20    Delirium R41.0    Acute on chronic anemia D64.9    Pleural effusion, right J90    Acute respiratory failure with hypoxia and hypercapnia (HCC) J96.01, J96.02    Acute confusion R41.0    Acute on chronic diastolic heart failure (HCC) I50.33 Macrocytosis D75.89    Other specified anemias D64.89    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, 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    Puerperal sepsis with acute hypoxic respiratory failure without septic shock ([facility]) O85, R65.20, J96.01    Pulmonary HTN ([facility]) I27.20    Chronic anticoagulation Z79.01    RVF (right ventricular failure) ([facility]) U50.135    Metabolic alkalosis H92.2    Sepsis ([facility]) A41.9    COPD exacerbation ([facility]) J44.1    Acute on chronic respiratory failure with hypercapnia ([facility]) J96.22    Persistent atrial fibrillation ([facility]) I48.19    Hypercapnic respiratory failure ([facility]) J96.92    Acute on chronic respiratory failure ([facility]) J96.20    Hyperkalemia E87.5    Heart failure ([facility]) I50.9    Acute renal insufficiency N28.9       Isolation/Infection:   Isolation            No Isolation          Patient Infection Status       Infection Onset Added Last Indicated Last Indicated By Review Planned Expiration Resolved Resolved By    None active    Resolved    COVID-19 (Rule Out) 11/06/22 11/06/22 11/06/22 COVID-19, Rapid (Ordered)   11/06/22 Rule-Out Test Resulted    COVID-19 (Rule Out) 10/12/22 10/12/22 10/12/22 COVID-19, Rapid (Ordered)   10/12/22 Rule-Out Test Resulted    COVID-19 (Rule Out) 10/05/22 10/05/22 10/05/22 Respiratory Panel, Molecular, with COVID-19 (Restricted: peds pts or suitable admitted adults) (Ordered)   10/05/22 Rule-Out Test Resulted    COVID-19 (Rule Out) 10/05/22 10/05/22 10/05/22 COVID-19, Rapid (Ordered)   10/05/22 Rule-Out Test Resulted    COVID-19 (Rule Out) 08/24/22 08/24/22 08/25/22 Respiratory Panel, Molecular, with COVID-19 (Restricted: peds pts or suitable admitted adults) (Ordered)   08/25/22 Rule-Out Test Resulted    COVID-19 (Rule Out) 07/16/22 07/16/22 07/16/22 Respiratory Panel, Molecular, with COVID-19 (Restricted: peds pts or suitable admitted adults) (Ordered)   07/16/22 Rule-Out Test Resulted    COVID-19 (Rule Out) 07/05/22 07/05/22 07/06/22 Respiratory Panel, Molecular, with COVID-19 (Restricted: peds pts or suitable admitted adults) (Ordered)   07/06/22 Rule-Out Test Resulted    COVID-19 (Rule Out) 05/11/22 05/11/22 05/11/22 COVID-19 & Influenza Combo (Ordered)   05/11/22 Rule-Out Test Resulted    COVID-19 (Rule Out) 03/17/22 03/17/22 03/17/22 Respiratory Panel, Molecular, with COVID-19 (Restricted: peds pts or suitable admitted adults) (Ordered)   03/17/22 Rule-Out Test Resulted    COVID-19 (Rule Out) 03/16/22 03/16/22 03/16/22 COVID-19, Rapid (Ordered)   03/16/22 Rule-Out Test Resulted    C-diff Rule Out 01/10/22 01/10/22 01/10/22 C. difficile toxin Molecular (Ordered)   03/17/22 [place]    Canceled  Mata Farooq RN 1/11/22 1300     COVID-19 (Rule Out) 01/09/22 01/09/22 01/09/22 COVID-19, Rapid (Ordered)   01/09/22 Rule-Out Test Resulted    MRSA 05/19/21 05/21/21 05/19/21 Culture, Wound   10/25/21 Esteban Godinez RN    COVID-19 (Rule Out) 02/04/21 02/04/21 02/04/21 COVID-19 (Ordered)   02/04/21 Rule-Out Test Resulted            Nurse Assessment:  Last Vital Signs: BP (!) 78/41   Pulse (!) 105   Temp 98.3 °F (36.8 °C) (Oral)   Resp 18   Ht 5' (1.524 m)   Wt 256 lb (116.1 kg)   SpO2 99%   BMI 50.00 kg/m²     Last documented pain score (0-10 scale): Pain Level: 8  Last Weight:   Wt Readings from Last 1 Encounters:   12/19/22 256 lb (116.1 kg)     Mental Status:  oriented and alert    IV Access:  - None    Nursing Mobility/ADLs:  Walking   Assisted  Transfer  Assisted  Bathing  Assisted  Dressing  Assisted  Toileting  Assisted  Feeding  Independent  Med Admin  Assisted  Med Delivery   meds in pudding     Wound Care Documentation and Therapy:        Elimination:  Continence:    Bowel: Yes  Bladder: No  Urinary Catheter: None   Colostomy/Ileostomy/Ileal Conduit: No       Date of Last BM: 12/24/2022    Intake/Output Summary (Last 24 hours) at 12/20/2022 1035  Last data filed at 12/20/2022 0618  Gross per 24 hour   Intake --   Output 400 ml   Net -400 ml     I/O last 3 completed shifts:  In: -   Out: 400 [Urine:400]    Safety Concerns: At Risk for Falls    Impairments/Disabilities:      Vision    Nutrition Therapy:  Current Nutrition Therapy:   1800 ml fluid restriction . Easy chew 4 carb diet and low NA+. Mildly thick/ nectar. No straws    Routes of Feeding: Oral  Liquids: Nectar  Daily Fluid Restriction: 1800 ml  Last Modified Barium Swallow with Video (Video Swallowing Test):     Treatments at the Time of Hospital Discharge:   Respiratory Treatments:   Oxygen Therapy:  3L N/C  Ventilator:        Rehab Therapies:   Weight Bearing Status/Restrictions: Other Medical Equipment (for information only, NOT a DME order): Other Treatments:     Patient's personal belongings (please select all that are sent with patient): Wheelchair, glasses, cell phone, clothing     RN SIGNATURE:  {Esignature:[id number]}    CASE MANAGEMENT/SOCIAL WORK SECTION    Inpatient Status Date: 12/19/22    Readmission Risk Assessment Score:  Readmission Risk              Risk of Unplanned Readmission:  56           Discharging to Facility/ Agency   Name: [facility]  Address: [address] [place] [address]  Phone: [phone]  Fax: [phone]    Dialysis Facility (if applicable)   Name:  Address:  Dialysis Schedule:  Phone:  Fax:    / signature: Electronically signed by Terrance Hobson RN on 12/20/2022 at 10:37 AM      PHYSICIAN SECTION    Prognosis: Fair    Condition at Discharge: Stable    Rehab Potential (if transferring to Rehab): Good    Recommended Labs or Other Treatments After Discharge: ***    Physician Certification: I certify the above information and transfer of Sebas Downey  is necessary for the continuing treatment of the diagnosis listed and that she requires [facility] for {GREATER/LESS:[id number]} 30 days.      Update Admission H&P: {[facility] DME Changes in [facility]VRQA:[id number]}    PHYSICIAN SIGNATURE:  {Esignature:[id number]}

## 2022-12-20 NOTE — CARE COORDINATION
12/20/22 0932 CM note: NO COVID TESTING THIS ADMIT. 2L nc vs bipap FIO2 40%. SW consult noted for new patient. Met with patient at the bedside to discuss transition of care at discharge. Pt came to us from SNF at [facility] and plans to return to this facility at discharge. Per [facility] [employer], pt is LTC resident at their facility and can return with NO PRECERT needed. WILL NEED RAPID COVID ON DAY OF DC, SIGNED [place], AND DC ORDER. CM will follow.  Electronically signed by Solo Cary RN on 12/20/2022 at 10:33 AM

## 2022-12-20 NOTE — CONSULTS
Inpatient CARDIOLOGY Consultation        Reason for Consult:  CHF, A fib    Date of Consultation: 2022    Patient previously known to Dr. Chisholm Fittinyear old with history of CAD, A Fib - s/p DCCV 2022, CKD, HTN, Pulmonary HTN, Home O2, obesity, COPD, hyperlipidemia,  hypertension, asthma, SERENA not compliant with CPAP, iron deficiency anemia, Parkinson's, depression, breast cancer with radiation therapy sent to ER from CHF clinic for SOB, LE edema and afib RVR. Patient states she has felt SOB and more swollen for about past 2 days. She also noticed that she has not been urinating as much as usual. She denies any changes such as not taking diuretics or drinking more fluid than usual.    In ER BP  low and had SHANTANU, CHF. Admitted and Cardiology consulted for CHF    Currently feeling better; denies CP or orthopnea. No N/V. No palpitations or dizzy. No family at bed side    REVIEW OF SYSTEMS:   Review of rest of 12 systems negative except as mentioned in HPI. Constitutional: Denies fatigue, fevers, chills   Eyes: Denies visual changes or drainage  ENT: Denies headaches or hearing loss. No sore throat. Cardiovascular: Denies chest pain, pressure. No orthopnea   Respiratory: Denies cough, no hemoptysis   Gastrointestinal: Denies nausea, vomiting, hematemesis or melena    Genitourinary: Denies urgency, dysuria or hematuria. Musculoskeletal: Denies gait disturbance, weakness   Integumentary: Denies rash, hives or pruritis   Neurological: Denies dizziness, headaches or seizures. No numbness or tingling  Psychiatric: Denies anxiety or depression. Endocrine: Denies temperature intolerance. Hematologic/Lymphatic: Denies abnormal bruising or bleeding.      Please note: past medical records were reviewed per electronic medical record (EMR) - see detailed reports under Past Medical/ Surgical History. Past Medical History:    Past Medical History:   Diagnosis Date    A-fib ([facility]     Acute on chronic congestive heart failure (HCC)     Anxiety     Asthma     CAD (coronary artery disease) 01/21/2016    Cancer ([facility]  breast ca 2006    right lumpectomy    Chronic kidney disease     nephrolithiasis    COPD exacerbation ([facility] 10/12/2022    Depression     Diabetes mellitus ([facility]     H/O mammogram     Hx MRSA infection     toe infection january 2012    Hyperlipidemia     Hypertension     Lateral epicondylitis     Morbid obesity (HCC)     SERENA on CPAP     Oxygen dependent     Parkinson's disease ([facility]     Tubal ligation status        Past Surgical History:    Past Surgical History:   Procedure Laterality Date    BREAST LUMPECTOMY      BREAST REDUCTION SURGERY      CARDIAC CATHETERIZATION  4/28/2014    Dr. Roe Nickels  01/11/2022    Dr. Saldivar Bowels  7/29/15    CT GUIDED CHEST TUBE  7/8/2022    CT GUIDED CHEST TUBE 7/8/2022 Teddy Mullen MD [facility]    ENDOSCOPY, COLON, DIAGNOSTIC  7/19/15    GALLBLADDER SURGERY      LUMBAR LAMINECTOMY      TOE AMPUTATION      TONSILLECTOMY      UPPER GASTROINTESTINAL ENDOSCOPY      UPPER GASTROINTESTINAL ENDOSCOPY N/A 7/19/2022    EGD ESOPHAGOGASTRODUODENOSCOPY performed by Aaron Hawley MD at [facility] ENDOSCOPY         Allergies:    Ciprofloxacin and Codeine    Social History:    Social History     Socioeconomic History    Marital status:       Spouse name: Not on file    Number of children: Not on file    Years of education: Not on file    Highest education level: Not on file   Occupational History    Not on file   Tobacco Use    Smoking status: Never    Smokeless tobacco: Never   Vaping Use    Vaping Use: Never used   Substance and Sexual Activity    Alcohol use: No    Drug use: No    Sexual activity: Never   Other Topics Concern    Not on file   Social History Narrative    Not on file     Social Determinants of Health     Financial Resource Strain: Not on file   Food Insecurity: Not on file   Transportation Needs: Not on file   Physical Activity: Not on file   Stress: Not on file   Social Connections: Not on file   Intimate Partner Violence: Not on file   Housing Stability: Not on file       Family History:   Family History   Problem Relation Age of Onset    Cancer Mother         Lung Ca    Cancer Father         lung Ca    Diabetes Sister     Heart Disease Sister     Seizures Son     Other Brother         sepsis         Medications Prior to admit: Reviewed  Prior to Admission medications    Medication Sig Start Date End Date Taking? Authorizing Provider   bumetanide (BUMEX) 2 MG tablet Take 2 mg by mouth 2 times daily    Historical Provider, MD   POTASSIUM CHLORIDE PO Take 20 mEq by mouth daily    Historical Provider, MD   LORazepam (ATIVAN) 0.5 MG tablet Take 0.5 mg by mouth nightly.     Historical Provider, MD   insulin lispro (HUMALOG) 100 UNIT/ML injection vial Inject into the skin 3 times daily (before meals)    Historical Provider, MD   sertraline (ZOLOFT) 50 MG tablet Take 50 mg by mouth daily    Historical Provider, MD   metoprolol succinate (TOPROL XL) 100 MG extended release tablet Take 1 tablet by mouth in the morning and at bedtime 10/10/22   Harvinder Ellison DO   furosemide (LASIX) 40 MG tablet Take 1 tablet by mouth daily  Patient not taking: No sig reported 10/11/22   Harvinder Ellison DO   loperamide (IMODIUM) 2 MG capsule Take 2 mg by mouth 4 times daily as needed for Diarrhea    Historical Provider, MD   OXYGEN Inhale 2 L into the lungs continuous    Historical Provider, MD   docusate sodium (COLACE, DULCOLAX) 100 MG CAPS Take 100 mg by mouth 2 times daily as needed for Constipation 9/8/22   Georgie Stewart MD   insulin glargine (LANTUS) 100 UNIT/ML injection vial Inject 13 Units into the skin 2 times daily 9/8/22   Georgie Stewart MD   atorvastatin (LIPITOR) 20 MG tablet Take 20 mg by mouth nightly    Historical Provider, MD   benzoin compound solution Apply 1 Applicatorful topically nightly Apply topically to right toe  Patient not taking: Reported on 12/19/2022    Historical Provider, MD   ipratropium-albuterol (DUONEB) 0.5-2.5 (3) MG/3ML SOLN nebulizer solution Inhale 1 vial into the lungs 4 times daily    Historical Provider, MD   miconazole (MICOTIN) 2 % powder Apply 1 each topically 2 times daily Apply topically under breasts twice daily    Historical Provider, MD   pantoprazole (PROTONIX) 40 MG tablet Take 40 mg by mouth every morning    Historical Provider, MD   mirtazapine (REMERON) 15 MG tablet Take 7.5 mg by mouth nightly    Historical Provider, MD   budesonide-formoterol (SYMBICORT) 160-4.5 MCG/ACT AERO Inhale 2 puffs into the lungs 2 times daily    Historical Provider, MD   acetaminophen (TYLENOL) 325 MG tablet Take 650 mg by mouth every 4 hours as needed for Pain or Fever    Historical Provider, MD   metoprolol tartrate (LOPRESSOR) 25 MG tablet Take 0.5 tablets by mouth in the morning and 0.5 tablets before bedtime. 7/27/22 9/8/22  Darrell Iqbal MD   carbidopa-levodopa (SINEMET CR)  MG per extended release tablet Take 2 tablets by mouth in the morning and 2 tablets at noon and 2 tablets in the evening. 7/23/22   Darrell Iqbal MD   rOPINIRole (REQUIP) 1 MG tablet Take 1 tablet by mouth in the morning and 1 tablet at noon and 1 tablet before bedtime.  7/23/22   Darrell Iqbal MD   amiodarone (CORDARONE) 200 MG tablet Take 1 tablet by mouth in the morning. 7/24/22 9/8/22  Darrell Iqbal MD   budesonide (PULMICORT) 0.5 MG/2ML nebulizer suspension Take 2 mLs by nebulization in the morning and 2 mLs in the evening. 7/23/22 9/8/22  Darrell Iqbal MD   insulin [facility]arg[facility]) 100 UNIT/ML injection vial Inject 5 Units into the skin nightly 7/23/22 9/8/22  Darrell Iqbal MD   QUEtiapine (SEROQUEL) 25 MG tablet Take 1 tablet by mouth in the morning and 1 tablet before bedtime. 7/23/22 9/8/22  Jillian Stevenson MD   sucralfate (CARAFATE) 1 GM tablet Take 1 tablet by mouth in the morning and 1 tablet at noon and 1 tablet in the evening and 1 tablet before bedtime. 7/20/22   Leyla Gaines DO   divalproex (DEPAKOTE) 250 MG DR tablet Take 250 mg by mouth 2 times daily  7/23/22  Historical Provider, MD   ferrous sulfate (IRON 325) 325 (65 Fe) MG tablet Take 325 mg by mouth daily (with breakfast)  Patient not taking: Reported on 7/7/2022 7/23/22  Historical Provider, MD   aspirin EC 81 MG EC tablet Take 1 tablet by mouth daily 5/17/22   Adam Ward MD   montelukast (SINGULAIR) 10 MG tablet Take 10 mg by mouth nightly    Historical Provider, MD         DATA:      ECG - Reviewed     Tele strips: Reviewed    Diagnostic:      Intake/Output Summary (Last 24 hours) at 12/20/2022 0811  Last data filed at 12/20/2022 0618  Gross per 24 hour   Intake --   Output 400 ml   Net -400 ml       IMAGING STUDIES: Reviewed    2D Echo:7/7/2022,  Summary   Technically difficult study - limited visualization. Micro-bubble contrast injected to enhance left ventricular visualization. Normal left ventricular size and systolic function. Ejection fraction is visually estimated at 70-75%. Indeterminate diastolic function. No regional wall motion abnormalities seen. Mild left ventricular concentric hypertrophy noted. Moderately dilated right ventricle with reduced function. Biatrial dilation. Mild tricuspid regurgitation. RVSP is at least 60 mmHg. Prominent pericardial fat pad. No definitive evidence of pericardial effusion    Left heart cath 1/11/2022  CONCLUSIONS:  1. Coronary artery disease:  A. Left main. No significant disease. B.  LAD. Heavily calcified proximal and mid vessel with 50% mid vessel  stenosis. 60% proximal stenosis of a moderate size first diagonal  branch. C.  LCX.   50% ostial narrowing of the AV groove branch in the mid vessel  which is a bifurcation lesion with a large marginal branch which itself  did not appear to have any significant disease. The AV groove branch of  the left circumflex continues to provide a posterior descending artery  branch and the posterolateral branch (codominant vessel). D.  RCA. Codominant vessel with no significant angiographic disease. 2.  Normal left ventricular size with hyperdynamic left ventricular  systolic function with an estimated ejection fraction of 75%. 3.  Systemic hypertension. 4.  Elevated left ventricular end-diastolic pressure. LABS:      Cardiac enzymes:  Recent Labs     12/19/22 0919 12/19/22  1258   TROPHS 89* 92*     CBC:   Recent Labs     12/19/22 0919 12/20/22  0506   WBC 6.9 5.1   HGB 9.0* 8.0*   HCT 31.9* 28.0*    172     BMP:   Recent Labs     12/19/22 0919 12/20/22  0506    140   K 5.1* 4.9   CO2 37* 32*   BUN 50* 50*   CREATININE 2.0* 1.8*   LABGLOM 26 29   CALCIUM 9.1 8.6     Mag: No results for input(s): MG in the last 72 hours. Phos: No results for input(s): PHOS in the last 72 hours. TSH: No results for input(s): TSH in the last 72 hours. HgA1c:   Lab Results   Component Value Date    LABA1C 6.8 (H) 12/19/2022     No results found for: EAG  proBNP:   Recent Labs     12/19/22 0919   PROBNP 15,566*     PT/INR: No results for input(s): PROTIME, INR in the last 72 hours. APTT:No results for input(s): APTT in the last 72 hours.   FASTING LIPID PANEL:  Lab Results   Component Value Date/Time    CHOL 95 05/12/2022 01:44 AM    HDL 38 05/12/2022 01:44 AM    LDLCALC 38 05/12/2022 01:44 AM    TRIG 93 05/12/2022 01:44 AM     LIVER PROFILE:  Recent Labs     12/20/22  0506   AST 9   ALT <5   LABALBU 2.9*       Current Inpatient Medications:   metoprolol  5 mg IntraVENous Once    aspirin EC  81 mg Oral Daily    atorvastatin  20 mg Oral Nightly    carbidopa-levodopa  2 tablet Oral TID    sucralfate  1 g Oral 4x Daily    rOPINIRole  1 mg Oral TID miconazole  1 each Topical BID    Arformoterol Tartrate  15 mcg Nebulization BID    budesonide  0.5 mg Nebulization BID    metoprolol succinate  100 mg Oral BID    sodium chloride flush  5-40 mL IntraVENous 2 times per day    insulin lispro  0-4 Units SubCUTAneous TID WC    insulin lispro  0-4 Units SubCUTAneous Nightly    mirtazapine  7.5 mg Oral Nightly    pantoprazole  40 mg Oral QAM    insulin glargine  13 Units SubCUTAneous BID       IV Infusions (if any):   sodium chloride      dextrose         PHYSICAL EXAM:     BP (!) 91/54   Pulse 81   Temp 98 °F (36.7 °C) (Oral)   Resp 18   Ht 5' (1.524 m)   Wt 256 lb (116.1 kg)   SpO2 100%   BMI 50.00 kg/m²     CONST:  Well developed, appears stated age. Awake, alert and no apparent distress. HEENT:   Head- Normocephalic  Eyes- Conjunctivae pink, no icterus  Throat- Oral mucosa moist  Neck-  No stridor, + jugular venous distention. No carotid bruit. CHEST: Chest symmetrical and non-tender to palpation. No accessory muscle use  RESPIRATORY: Lung sounds - Few rhonchi  CARDIOVASCULAR:     Heart Inspection-OK  Heart Palpation- No thrills. Heart Ausculation- Irregularly irregular rate and rhythm, 2/6 systolic murmur. No s3 or rub   EXT: + lower extremity edema. Distal pulses palpable, no cyanosis   ABDOMEN: Soft, non-tender to light palpation. Bowel sounds present. No abdominal bruit  MS: Good muscle strength    : Deferred  RECTAL: Deferred  SKIN: Warm and dry   NEURO / PSYCH: Oriented to person, place; Good mood and affect.         IMPRESSION / RECOMMENDATIONS:    Acute on Chronic HFpEF- (HCC) - EF 70-75% - Continue diuretics as her BP, renal Fn tolerates; Monitor Wts, I/os, BP, renal Fn - Add SGLT-2i when renal Fn OK    Acute renal insufficiency - Monitor renal Fn    Elevated Troponin  - Flat pattern likely from SHANTANU, CHF - No CP, EKG reviewed    Paroxysmal Atrial fibrillation with rapid ventricular response (HCC) - Rates controlled now; Resume her OAC-Eliquis, if no surgical procedure planned; Monitor H/H; she is agreeable    Hypotension - Decrease Metoprolol dose; Add Midodrine    Chronic anemia - Monitor H/H    CAD: [facility] Jan 2022 ( 50% mid LAD, 60% Ostial Dx; 50% AV branch of left circumflex artery) - Continue low dose BB as her BP tolerates, statin. Discontinue ASA since she will be on Eliquis    Tricuspid valve regurgitation - Mild    Pulmonary hypertension, Moderate    Hyperlipidemia - On Statin    Type 2 diabetes mellitus -Per Dr Courtney Muñoz    History of beta-blocker and amiodarone induced bradycardia - Monitor HR. No family at bed side  Above recommendations discussed with her.       Electronically signed by Mark Buchanan MD on 12/20/2022 at [address] [facility]

## 2022-12-21 LAB
ALBUMIN SERPL-MCNC: 3 G/DL (ref 3.5–5.2)
ALP BLD-CCNC: 94 U/L (ref 35–104)
ALT SERPL-CCNC: <5 U/L (ref 0–32)
ANION GAP SERPL CALCULATED.3IONS-SCNC: 11 MMOL/L (ref 7–16)
ANISOCYTOSIS: ABNORMAL
AST SERPL-CCNC: 9 U/L (ref 0–31)
BASOPHILIC STIPPLING: ABNORMAL
BASOPHILS ABSOLUTE: 0 E9/L (ref 0–0.2)
BASOPHILS RELATIVE PERCENT: 0.5 % (ref 0–2)
BILIRUB SERPL-MCNC: 0.3 MG/DL (ref 0–1.2)
BUN BLDV-MCNC: 47 MG/DL (ref 6–23)
CALCIUM SERPL-MCNC: 8.8 MG/DL (ref 8.6–10.2)
CHLORIDE BLD-SCNC: 100 MMOL/L (ref 98–107)
CO2: 31 MMOL/L (ref 22–29)
CREAT SERPL-MCNC: 1.7 MG/DL (ref 0.5–1)
EOSINOPHILS ABSOLUTE: 0.26 E9/L (ref 0.05–0.5)
EOSINOPHILS RELATIVE PERCENT: 4.3 % (ref 0–6)
GFR SERPL CREATININE-BSD FRML MDRD: 31 ML/MIN/1.73
GLUCOSE BLD-MCNC: 80 MG/DL (ref 74–99)
HCT VFR BLD CALC: 31.3 % (ref 34–48)
HEMOGLOBIN: 8.6 G/DL (ref 11.5–15.5)
HYPOCHROMIA: ABNORMAL
LYMPHOCYTES ABSOLUTE: 0.85 E9/L (ref 1.5–4)
LYMPHOCYTES RELATIVE PERCENT: 13.9 % (ref 20–42)
MCH RBC QN AUTO: 25.8 PG (ref 26–35)
MCHC RBC AUTO-ENTMCNC: 27.5 % (ref 32–34.5)
MCV RBC AUTO: 94 FL (ref 80–99.9)
METER GLUCOSE: 140 MG/DL (ref 74–99)
METER GLUCOSE: 190 MG/DL (ref 74–99)
METER GLUCOSE: 194 MG/DL (ref 74–99)
METER GLUCOSE: 98 MG/DL (ref 74–99)
MONOCYTES ABSOLUTE: 0.24 E9/L (ref 0.1–0.95)
MONOCYTES RELATIVE PERCENT: 4.3 % (ref 2–12)
NEUTROPHILS ABSOLUTE: 4.7 E9/L (ref 1.8–7.3)
NEUTROPHILS RELATIVE PERCENT: 77.4 % (ref 43–80)
OVALOCYTES: ABNORMAL
PDW BLD-RTO: 17.8 FL (ref 11.5–15)
PLATELET # BLD: 227 E9/L (ref 130–450)
PMV BLD AUTO: 10.3 FL (ref 7–12)
POIKILOCYTES: ABNORMAL
POLYCHROMASIA: ABNORMAL
POTASSIUM REFLEX MAGNESIUM: 4.5 MMOL/L (ref 3.5–5)
RBC # BLD: 3.33 E12/L (ref 3.5–5.5)
SODIUM BLD-SCNC: 142 MMOL/L (ref 132–146)
TARGET CELLS: ABNORMAL
TEAR DROP CELLS: ABNORMAL
TOTAL PROTEIN: 5.2 G/DL (ref 6.4–8.3)
WBC # BLD: 6.1 E9/L (ref 4.5–11.5)

## 2022-12-21 PROCEDURE — 82962 GLUCOSE BLOOD TEST: CPT

## 2022-12-21 PROCEDURE — 2580000003 HC RX 258: Performed by: NURSE PRACTITIONER

## 2022-12-21 PROCEDURE — 94640 AIRWAY INHALATION TREATMENT: CPT

## 2022-12-21 PROCEDURE — 2700000000 HC OXYGEN THERAPY PER DAY

## 2022-12-21 PROCEDURE — 6360000002 HC RX W HCPCS: Performed by: INTERNAL MEDICINE

## 2022-12-21 PROCEDURE — 99233 SBSQ HOSP IP/OBS HIGH 50: CPT | Performed by: INTERNAL MEDICINE

## 2022-12-21 PROCEDURE — 85025 COMPLETE CBC W/AUTO DIFF WBC: CPT

## 2022-12-21 PROCEDURE — 80053 COMPREHEN METABOLIC PANEL: CPT

## 2022-12-21 PROCEDURE — 36415 COLL VENOUS BLD VENIPUNCTURE: CPT

## 2022-12-21 PROCEDURE — APPSS30 APP SPLIT SHARED TIME 16-30 MINUTES: Performed by: NURSE PRACTITIONER

## 2022-12-21 PROCEDURE — 6370000000 HC RX 637 (ALT 250 FOR IP): Performed by: INTERNAL MEDICINE

## 2022-12-21 PROCEDURE — 94660 CPAP INITIATION&MGMT: CPT

## 2022-12-21 PROCEDURE — 1200000000 HC SEMI PRIVATE

## 2022-12-21 PROCEDURE — 6370000000 HC RX 637 (ALT 250 FOR IP): Performed by: STUDENT IN AN ORGANIZED HEALTH CARE EDUCATION/TRAINING PROGRAM

## 2022-12-21 PROCEDURE — 6370000000 HC RX 637 (ALT 250 FOR IP): Performed by: NURSE PRACTITIONER

## 2022-12-21 PROCEDURE — 6360000002 HC RX W HCPCS: Performed by: NURSE PRACTITIONER

## 2022-12-21 RX ORDER — LORAZEPAM 0.5 MG/1
0.5 TABLET ORAL NIGHTLY PRN
Status: DISCONTINUED | OUTPATIENT
Start: 2022-12-21 | End: 2022-12-25 | Stop reason: HOSPADM

## 2022-12-21 RX ORDER — BUMETANIDE 1 MG/1
2 TABLET ORAL DAILY
Status: DISCONTINUED | OUTPATIENT
Start: 2022-12-22 | End: 2022-12-25 | Stop reason: HOSPADM

## 2022-12-21 RX ADMIN — APIXABAN 5 MG: 5 TABLET, FILM COATED ORAL at 08:51

## 2022-12-21 RX ADMIN — SUCRALFATE 1 G: 1 TABLET ORAL at 22:15

## 2022-12-21 RX ADMIN — MIDODRINE HYDROCHLORIDE 2.5 MG: 2.5 TABLET ORAL at 03:49

## 2022-12-21 RX ADMIN — SUCRALFATE 1 G: 1 TABLET ORAL at 08:47

## 2022-12-21 RX ADMIN — ARFORMOTEROL TARTRATE 15 MCG: 15 SOLUTION RESPIRATORY (INHALATION) at 04:49

## 2022-12-21 RX ADMIN — SUCRALFATE 1 G: 1 TABLET ORAL at 17:55

## 2022-12-21 RX ADMIN — CARBIDOPA AND LEVODOPA 2 TABLET: 25; 100 TABLET, EXTENDED RELEASE ORAL at 08:47

## 2022-12-21 RX ADMIN — ANTI-FUNGAL POWDER MICONAZOLE NITRATE TALC FREE 1 EACH: 1.42 POWDER TOPICAL at 22:25

## 2022-12-21 RX ADMIN — MIRTAZAPINE 7.5 MG: 15 TABLET, FILM COATED ORAL at 22:16

## 2022-12-21 RX ADMIN — ATORVASTATIN CALCIUM 20 MG: 10 TABLET, FILM COATED ORAL at 22:16

## 2022-12-21 RX ADMIN — BUDESONIDE 500 MCG: 0.5 SUSPENSION RESPIRATORY (INHALATION) at 04:49

## 2022-12-21 RX ADMIN — MIDODRINE HYDROCHLORIDE 2.5 MG: 2.5 TABLET ORAL at 10:00

## 2022-12-21 RX ADMIN — ACETAMINOPHEN 650 MG: 325 TABLET ORAL at 02:18

## 2022-12-21 RX ADMIN — CARBIDOPA AND LEVODOPA 2 TABLET: 25; 100 TABLET, EXTENDED RELEASE ORAL at 13:39

## 2022-12-21 RX ADMIN — MIDODRINE HYDROCHLORIDE 2.5 MG: 2.5 TABLET ORAL at 12:17

## 2022-12-21 RX ADMIN — FUROSEMIDE 40 MG: 10 INJECTION, SOLUTION INTRAMUSCULAR; INTRAVENOUS at 08:48

## 2022-12-21 RX ADMIN — INSULIN GLARGINE 13 UNITS: 100 INJECTION, SOLUTION SUBCUTANEOUS at 22:17

## 2022-12-21 RX ADMIN — MIDODRINE HYDROCHLORIDE 2.5 MG: 2.5 TABLET ORAL at 17:55

## 2022-12-21 RX ADMIN — PANTOPRAZOLE SODIUM 40 MG: 40 TABLET, DELAYED RELEASE ORAL at 08:47

## 2022-12-21 RX ADMIN — APIXABAN 5 MG: 5 TABLET, FILM COATED ORAL at 22:16

## 2022-12-21 RX ADMIN — Medication 5 ML: at 08:59

## 2022-12-21 RX ADMIN — SUCRALFATE 1 G: 1 TABLET ORAL at 13:39

## 2022-12-21 RX ADMIN — LORAZEPAM 0.5 MG: 0.5 TABLET ORAL at 23:36

## 2022-12-21 RX ADMIN — ANTI-FUNGAL POWDER MICONAZOLE NITRATE TALC FREE 1 EACH: 1.42 POWDER TOPICAL at 08:58

## 2022-12-21 RX ADMIN — INSULIN GLARGINE 13 UNITS: 100 INJECTION, SOLUTION SUBCUTANEOUS at 08:57

## 2022-12-21 RX ADMIN — Medication 10 ML: at 22:16

## 2022-12-21 RX ADMIN — ROPINIROLE HYDROCHLORIDE 1 MG: 1 TABLET, FILM COATED ORAL at 13:40

## 2022-12-21 RX ADMIN — CARBIDOPA AND LEVODOPA 2 TABLET: 25; 100 TABLET, EXTENDED RELEASE ORAL at 22:15

## 2022-12-21 RX ADMIN — ROPINIROLE HYDROCHLORIDE 1 MG: 1 TABLET, FILM COATED ORAL at 22:16

## 2022-12-21 RX ADMIN — ROPINIROLE HYDROCHLORIDE 1 MG: 1 TABLET, FILM COATED ORAL at 08:47

## 2022-12-21 ASSESSMENT — PAIN SCALES - GENERAL: PAINLEVEL_OUTOF10: 0

## 2022-12-21 NOTE — CARE COORDINATION
SS NOTE: NO COVID TESTING, PT WILL NEED A RAPID NEGATIVE COVID TEST TO RETURN TO [facility]. Pt is on  2L nc vs bipap FIO2 40%. Pt is from [facility] and plans to return at [facility]. Pt is Long term care Resident  At [facility] and can return with NO PRECERT, she will need a signed [place], current PT and OT to return to SNF. KRISHNA /ZACHERY to continue to follow pt while here. ESTHER Davis.12/21/2022.5:04 PM.

## 2022-12-21 NOTE — PROGRESS NOTES
[address] [place], [place] [address]ist Progress Note    Admitting Date and Time: 12/19/2022  8:48 AM  Admit Dx: Heart failure (HCC) [I50.9]  Acute renal insufficiency [N28.9]  Acute on chronic combined systolic and diastolic congestive heart failure ([address] [I50.43]  Anemia, unspecified type [D64.9]    Subjective:  12/20: Pt received 250ml IVF bolus this am for asymptomatic hypotension (78/41). States she slept ok. Denies CP, SOB. No needs at this time. 12/21: BP improved since started on midodrine. Pt states she is breathing and feeling much better with diuretic. States her LE's are less tender. Denies CP, SOB. No needs at this time. PMH:  has a past medical history of A-fib ([facility] Acute on chronic congestive heart failure ([address] Anxiety, Asthma, CAD (coronary artery disease), Cancer ([address] Chronic kidney disease, COPD exacerbation ([address] Depression, Diabetes mellitus ([address] H/O mammogram, Hx MRSA infection, Hyperlipidemia, Hypertension, Lateral epicondylitis, Morbid obesity ([address] SERENA on CPAP, Oxygen dependent, Parkinson's disease ([address] and Tubal ligation status. REVIEW OF SYSTEMS:  no fevers, chills, cp, sob, n/v, ha, vision/hearing changes, wt changes, hot/cold flashes, other open skin lesions, diarrhea, constipation, dysuria/hematuria unless noted in HPI. Complete ROS performed with the patient and is otherwise negative.     Objective:    BP (!) 100/56   Pulse 86   Temp 98 °F (36.7 °C) (Axillary)   Resp 18   Ht 5' (1.524 m)   Wt 256 lb (116.1 kg)   SpO2 100%   BMI 50.00 kg/m²     PHYSICAL EXAM  General Appearance: alert and oriented to person, place and time and in no acute distress  Skin: warm and dry  Head: normocephalic and atraumatic  Eyes: pupils equal, round, and reactive to light, extraocular eye movements intact, conjunctivae normal  Neck: neck supple and non tender without mass   Pulmonary/Chest: clear to auscultation bilaterally- no wheezes, rales or rhonchi, normal air movement, no respiratory distress. Currently on baseline O2 2L NC  Cardiovascular: normal rate, normal S1 and S2 and no carotid bruits  Abdomen: soft, round, non-tender, non-distended, normal bowel sounds, no masses or organomegaly  Extremities: no cyanosis, no clubbing. 2+ pitting edema to bilat LE's  Neurologic: no cranial nerve deficit and speech normal    Recent Labs     12/19/22  0919 12/20/22  0506 12/21/22  0703    140 142   K 5.1* 4.9 4.5   CL 99 100 100   CO2 37* 32* 31*   BUN 50* 50* 47*   CREATININE 2.0* 1.8* 1.7*   GLUCOSE 103* 98 80   CALCIUM 9.1 8.6 8.8     Recent Labs     12/19/22  0919 12/20/22  0506 12/21/22  0703   WBC 6.9 5.1 6.1   RBC 3.39* 3.00* 3.33*   HGB 9.0* 8.0* 8.6*   HCT 31.9* 28.0* 31.3*   MCV 94.1 93.3 94.0   MCH 26.5 26.7 25.8*   MCHC 28.2* 28.6* 27.5*   RDW 17.6* 17.9* 17.8*    172 227   MPV 10.6 10.4 10.3         Assessment & Plan:    HFpEF with exacerbation 7/2022 ECHO with EF of 70-75%, indeterminate diastolic dysfunction  Initial BNP >15k. Still with pitting edema in bilat LE's through knees, but not as tight as previous exams. Cardiology following, lasix 40 mg IV daily. 1800ml FR     A-fib RVR initial rate 120's-160's  Currently CVR  Cardiology following, reduced home Toprol XL from 100mg BID to 25mg daily. Started Eliquis. Continue home asa. Continuous telemetry. Hypotension  Cardiology added midodrine 2.5mg TID. BP improved today. Chronic anemia Initial Hgb 9.0-at/near baseline  Hgb today 8.0. Monitor     DMII A1c 6.8  Continue home Lantus. AC, HS BS checks with low-dose SSI coverage. Hypoglycemia protocol. 4 carb diet     SHANTANU on CKD Initial Cr 2.0 (baseline 1.4-1.7)  Cr 2.0-->1.8-->1.7. Monitor kidney function closely as daily IV diuretic started today. Currently at beaseline, but if Cr rises- may need to consult nephrology for assistance with diuresis in the setting of SHANTANU. Elevated troponins with negative delta 89, 92  Pt denies chest pain.  Likely 2/2 vol overload. Cardiology following. COPD with chronic hypoxia Baseline home O2 2-3L  Brovana, Pulmicort prn. Xopenex q4h prn (currently RVR). Oxygen therapy protocol. SERENA on Cpap  HLD 5/2022 Lipid panel WNL  Anxiety  Home bipap  Continue home statin  Resume ativan 0.5mg nightly. OARRS reviewed. Code Status: Full Code  DVT prophylaxis: Eliquis    Disposition: Anticipate return to SNF. 30 minutes or more spent reviewing patient chart, assessing patient, discussing plan of care with patient and family, discussing plan of care with collaborating physician, and documentation. NOTE: This report was transcribed using voice recognition software. Every effort was made to ensure accuracy; however, inadvertent computerized transcription errors may be present.   Electronically signed by RASHARD Bach NP on 12/21/2022 at 7:30 AM

## 2022-12-21 NOTE — PROGRESS NOTES
[facility]) Physicians        CARDIOLOGY                 INPATIENT PROGRESS NOTE          PATIENT SEEN IN FOLLOW UP FOR: CHF    Hospital Day: 3     Beatriz Carpenter is a 67year old patient known to Dr. Wiseman Rolling: Denies any Cp, breathing better, No orthopnea \" I can move my feet now\"    ROS: Review of rest of 10 systems negative except as mentioned above    OBJECTIVE: No acute distress. See Assessment     Diagnostics:       Telemetry:  Reviewed        Intake/Output Summary (Last 24 hours) at 12/21/2022 1410  Last data filed at 12/21/2022 0349  Gross per 24 hour   Intake 240 ml   Output 900 ml   Net -660 ml       Labs:   CBC:   Recent Labs     12/20/22  0506 12/21/22  0703   WBC 5.1 6.1   HGB 8.0* 8.6*   HCT 28.0* 31.3*    227     BMP:   Recent Labs     12/20/22  0506 12/21/22  0703    142   K 4.9 4.5   CO2 32* 31*   BUN 50* 47*   CREATININE 1.8* 1.7*   LABGLOM 29 31   CALCIUM 8.6 8.8     Mag: No results for input(s): MG in the last 72 hours. Phos: No results for input(s): PHOS in the last 72 hours. TSH: No results for input(s): TSH in the last 72 hours. HgA1c:     BNP: No results for input(s): BNP in the last 72 hours. PT/INR: No results for input(s): PROTIME, INR in the last 72 hours. APTT:No results for input(s): APTT in the last 72 hours.   CARDIAC ENZYMES:  Recent Labs     12/19/22  0919 12/19/22  1258   TROPHS 89* 92*     FASTING LIPID PANEL:  Lab Results   Component Value Date/Time    CHOL 95 05/12/2022 01:44 AM    HDL 38 05/12/2022 01:44 AM    LDLCALC 38 05/12/2022 01:44 AM    TRIG 93 05/12/2022 01:44 AM     LIVER PROFILE:  Recent Labs     12/20/22  0506 12/21/22  0703   AST 9 9   ALT <5 <5   LABALBU 2.9* 3.0*       Current Inpatient Medications:   metoprolol succinate  25 mg Oral Daily    midodrine  2.5 mg Oral TID WC    furosemide  40 mg IntraVENous Daily    apixaban  5 mg Oral BID    metoprolol  5 mg IntraVENous Once    atorvastatin  20 mg Oral Nightly carbidopa-levodopa  2 tablet Oral TID    sucralfate  1 g Oral 4x Daily    rOPINIRole  1 mg Oral TID    miconazole  1 each Topical BID    Arformoterol Tartrate  15 mcg Nebulization BID    budesonide  0.5 mg Nebulization BID    sodium chloride flush  5-40 mL IntraVENous 2 times per day    insulin lispro  0-4 Units SubCUTAneous TID WC    insulin lispro  0-4 Units SubCUTAneous Nightly    mirtazapine  7.5 mg Oral Nightly    pantoprazole  40 mg Oral QAM    insulin glargine  13 Units SubCUTAneous BID       IV Infusions (if any):   sodium chloride      dextrose           PHYSICAL EXAM:     CONSTITUTIONAL:   BP (!) 90/40   Pulse 90   Temp 98 °F (36.7 °C) (Oral)   Resp 18   Ht 5' (1.524 m)   Wt 256 lb (116.1 kg)   SpO2 100%   BMI 50.00 kg/m²   Pulse  Av.7  Min: 79  Max: 136  Systolic (23BGO), VNR:30 , Min:88 , JPJ:578    Diastolic (05PHV), GUDELIA:83, Min:40, Max:61    CONST:  Well developed, appears stated age. Awake, alert and no apparent distress. HEENT:   Head- Normocephalic  Eyes- Conjunctivae pink, no icterus  Throat- Oral mucosa moist  Neck-  No stridor, + jugular venous distention. No carotid bruit. CHEST: Chest symmetrical and non-tender to palpation. No accessory muscle use  RESPIRATORY: Lung sounds - Few rhonchi  CARDIOVASCULAR:     Heart Inspection-OK  Heart Palpation- No thrills. Heart Ausculation- Irregularly irregular rate and rhythm, 2/6 systolic murmur. No s3 or rub   EXT: + lower extremity edema. Distal pulses palpable, no cyanosis   ABDOMEN: Soft, Bowel sounds present. No abdominal bruit  MS: n/a  : Deferred  RECTAL: Deferred  SKIN: Warm and dry   NEURO / PSYCH: Oriented to person, place; Good mood and affect.           IMPRESSION / RECOMMENDATIONS:     Acute on Chronic HFpEF- (HCC) - EF 70-75% - Continue PO diuretics as her BP, renal Fn tolerates; Monitor Wts, I/Os, BP, renal Fn - Add SGLT-2i when renal Fn OK     CKD 3b - Monitor renal Fn     Elevated Troponin  - Flat pattern likely from SHANTANU, CHF - No CP, EKG reviewed     Paroxysmal Atrial fibrillation with rapid ventricular response (HCC) - Rates controlled now; Resumed her OAC-Eliquis, Monitor H/H     Hypotension - Decrease Metoprolol dose; Midodrine added, increase to 5mg TID     Chronic anemia - Monitor H/H     CAD: [facility] Jan 2022 ( 50% mid LAD, 60% Ostial Dx; 50% AV branch of left circumflex artery) - Continue low dose BB as her BP tolerates, and Statin. ASA discontinued due to Anemia and on Eliquis     Tricuspid valve regurgitation - Mild     Pulmonary hypertension, Moderate     Hyperlipidemia - On Statin     Type 2 diabetes mellitus -Per Dr Bob Rangel                 No family at bed side    Home 24 hrs if better. Above recommendations discussed with her.     F/u with LALITO Contreras 2-3 weeks after discharge      Electronically signed by Romy Iyer MD on 12/21/2022 at 2:10 PM  [facility]) Cardiology

## 2022-12-21 NOTE — PROGRESS NOTES
Physician Progress Note      PATIENT:               Deja Stevens  CSN #:                  [id number]  :                       1950  ADMIT DATE:       2022 8:48 AM  DISCH DATE:  RESPONDING  PROVIDER #:        Ruben Camacho MD        QUERY TEXT:    Stage of Chronic Kidney Disease: Please provide further specificity, if known. Clinical indicators include: pro-bnp, ckd, reatinine, creatinine, chronic   kidney disease, bnp, cr, bun, brain natriuretic peptide, probnp  Options provided:  -- Chronic kidney disease stage 1  -- Chronic kidney disease stage 2  -- Chronic kidney disease stage 3  -- Chronic kidney disease stage 3a  -- Chronic kidney disease stage 3b  -- Chronic kidney disease stage 4  -- Chronic kidney disease stage 5  -- Chronic kidney disease stage 5, requiring dialysis  -- End stage renal disease  -- Other - I will add my own diagnosis  -- Disagree - Not applicable / Not valid  -- Disagree - Clinically Unable to determine / Unknown        PROVIDER RESPONSE TEXT:    The patient has chronic kidney disease stage 3b.       Electronically signed by:  Ruben Camacho MD 2022 4:20 AM

## 2022-12-22 PROBLEM — D64.9 ANEMIA: Status: ACTIVE | Noted: 2022-12-22

## 2022-12-22 LAB
ACANTHOCYTES: ABNORMAL
ALBUMIN SERPL-MCNC: 2.9 G/DL (ref 3.5–5.2)
ALP BLD-CCNC: 88 U/L (ref 35–104)
ALT SERPL-CCNC: <5 U/L (ref 0–32)
ANION GAP SERPL CALCULATED.3IONS-SCNC: 8 MMOL/L (ref 7–16)
ANISOCYTOSIS: ABNORMAL
AST SERPL-CCNC: 8 U/L (ref 0–31)
BASOPHILIC STIPPLING: ABNORMAL
BASOPHILS ABSOLUTE: 0.11 E9/L (ref 0–0.2)
BASOPHILS RELATIVE PERCENT: 1.9 % (ref 0–2)
BILIRUB SERPL-MCNC: 0.3 MG/DL (ref 0–1.2)
BUN BLDV-MCNC: 45 MG/DL (ref 6–23)
CALCIUM SERPL-MCNC: 8.5 MG/DL (ref 8.6–10.2)
CHLORIDE BLD-SCNC: 103 MMOL/L (ref 98–107)
CO2: 32 MMOL/L (ref 22–29)
CREAT SERPL-MCNC: 1.5 MG/DL (ref 0.5–1)
EOSINOPHILS ABSOLUTE: 0 E9/L (ref 0.05–0.5)
EOSINOPHILS RELATIVE PERCENT: 1.2 % (ref 0–6)
GFR SERPL CREATININE-BSD FRML MDRD: 37 ML/MIN/1.73
GLUCOSE BLD-MCNC: 130 MG/DL (ref 74–99)
HCT VFR BLD CALC: 30.5 % (ref 34–48)
HEMOGLOBIN: 8.5 G/DL (ref 11.5–15.5)
HYPOCHROMIA: ABNORMAL
LYMPHOCYTES ABSOLUTE: 0.9 E9/L (ref 1.5–4)
LYMPHOCYTES RELATIVE PERCENT: 15.4 % (ref 20–42)
MCH RBC QN AUTO: 26.2 PG (ref 26–35)
MCHC RBC AUTO-ENTMCNC: 27.9 % (ref 32–34.5)
MCV RBC AUTO: 94.1 FL (ref 80–99.9)
METER GLUCOSE: 132 MG/DL (ref 74–99)
METER GLUCOSE: 194 MG/DL (ref 74–99)
METER GLUCOSE: 215 MG/DL (ref 74–99)
METER GLUCOSE: 256 MG/DL (ref 74–99)
MONOCYTES ABSOLUTE: 0 E9/L (ref 0.1–0.95)
MONOCYTES RELATIVE PERCENT: 6 % (ref 2–12)
NEUTROPHILS ABSOLUTE: 4.98 E9/L (ref 1.8–7.3)
NEUTROPHILS RELATIVE PERCENT: 82.7 % (ref 43–80)
NUCLEATED RED BLOOD CELLS: 7.7 /100 WBC
OVALOCYTES: ABNORMAL
PDW BLD-RTO: 17.6 FL (ref 11.5–15)
PLATELET # BLD: 200 E9/L (ref 130–450)
PMV BLD AUTO: 9.9 FL (ref 7–12)
POIKILOCYTES: ABNORMAL
POLYCHROMASIA: ABNORMAL
POTASSIUM REFLEX MAGNESIUM: 4.3 MMOL/L (ref 3.5–5)
RBC # BLD: 3.24 E12/L (ref 3.5–5.5)
SARS-COV-2, NAAT: NOT DETECTED
SCHISTOCYTES: ABNORMAL
SODIUM BLD-SCNC: 143 MMOL/L (ref 132–146)
TEAR DROP CELLS: ABNORMAL
TOTAL PROTEIN: 5 G/DL (ref 6.4–8.3)
WBC # BLD: 6 E9/L (ref 4.5–11.5)

## 2022-12-22 PROCEDURE — 97530 THERAPEUTIC ACTIVITIES: CPT

## 2022-12-22 PROCEDURE — 6370000000 HC RX 637 (ALT 250 FOR IP): Performed by: INTERNAL MEDICINE

## 2022-12-22 PROCEDURE — 6370000000 HC RX 637 (ALT 250 FOR IP): Performed by: NURSE PRACTITIONER

## 2022-12-22 PROCEDURE — 80053 COMPREHEN METABOLIC PANEL: CPT

## 2022-12-22 PROCEDURE — 2700000000 HC OXYGEN THERAPY PER DAY

## 2022-12-22 PROCEDURE — 97161 PT EVAL LOW COMPLEX 20 MIN: CPT

## 2022-12-22 PROCEDURE — 6360000002 HC RX W HCPCS: Performed by: INTERNAL MEDICINE

## 2022-12-22 PROCEDURE — 1200000000 HC SEMI PRIVATE

## 2022-12-22 PROCEDURE — 87635 SARS-COV-2 COVID-19 AMP PRB: CPT

## 2022-12-22 PROCEDURE — 2580000003 HC RX 258: Performed by: NURSE PRACTITIONER

## 2022-12-22 PROCEDURE — 36415 COLL VENOUS BLD VENIPUNCTURE: CPT

## 2022-12-22 PROCEDURE — 85025 COMPLETE CBC W/AUTO DIFF WBC: CPT

## 2022-12-22 PROCEDURE — 94660 CPAP INITIATION&MGMT: CPT

## 2022-12-22 PROCEDURE — 93005 ELECTROCARDIOGRAM TRACING: CPT | Performed by: FAMILY MEDICINE

## 2022-12-22 PROCEDURE — 6370000000 HC RX 637 (ALT 250 FOR IP): Performed by: STUDENT IN AN ORGANIZED HEALTH CARE EDUCATION/TRAINING PROGRAM

## 2022-12-22 PROCEDURE — 6360000002 HC RX W HCPCS: Performed by: NURSE PRACTITIONER

## 2022-12-22 PROCEDURE — 94640 AIRWAY INHALATION TREATMENT: CPT

## 2022-12-22 PROCEDURE — 82962 GLUCOSE BLOOD TEST: CPT

## 2022-12-22 PROCEDURE — 97165 OT EVAL LOW COMPLEX 30 MIN: CPT

## 2022-12-22 PROCEDURE — 2500000003 HC RX 250 WO HCPCS: Performed by: INTERNAL MEDICINE

## 2022-12-22 RX ORDER — DIGOXIN 0.25 MG/ML
125 INJECTION INTRAMUSCULAR; INTRAVENOUS ONCE
Status: DISCONTINUED | OUTPATIENT
Start: 2022-12-22 | End: 2022-12-22

## 2022-12-22 RX ORDER — DIGOXIN 0.25 MG/ML
125 INJECTION INTRAMUSCULAR; INTRAVENOUS ONCE
Status: COMPLETED | OUTPATIENT
Start: 2022-12-22 | End: 2022-12-22

## 2022-12-22 RX ORDER — DIGOXIN 0.25 MG/ML
250 INJECTION INTRAMUSCULAR; INTRAVENOUS EVERY 6 HOURS
Status: COMPLETED | OUTPATIENT
Start: 2022-12-23 | End: 2022-12-23

## 2022-12-22 RX ORDER — MIDODRINE HYDROCHLORIDE 5 MG/1
5 TABLET ORAL
Status: DISCONTINUED | OUTPATIENT
Start: 2022-12-22 | End: 2022-12-25 | Stop reason: HOSPADM

## 2022-12-22 RX ORDER — DILTIAZEM HYDROCHLORIDE 5 MG/ML
10 INJECTION INTRAVENOUS ONCE
Status: COMPLETED | OUTPATIENT
Start: 2022-12-22 | End: 2022-12-22

## 2022-12-22 RX ADMIN — Medication 10 ML: at 09:17

## 2022-12-22 RX ADMIN — INSULIN GLARGINE 13 UNITS: 100 INJECTION, SOLUTION SUBCUTANEOUS at 09:19

## 2022-12-22 RX ADMIN — INSULIN GLARGINE 13 UNITS: 100 INJECTION, SOLUTION SUBCUTANEOUS at 20:48

## 2022-12-22 RX ADMIN — APIXABAN 5 MG: 5 TABLET, FILM COATED ORAL at 09:11

## 2022-12-22 RX ADMIN — ROPINIROLE HYDROCHLORIDE 1 MG: 1 TABLET, FILM COATED ORAL at 09:11

## 2022-12-22 RX ADMIN — INSULIN LISPRO 2 UNITS: 100 INJECTION, SOLUTION INTRAVENOUS; SUBCUTANEOUS at 18:09

## 2022-12-22 RX ADMIN — ACETAMINOPHEN 650 MG: 325 TABLET ORAL at 00:55

## 2022-12-22 RX ADMIN — APIXABAN 5 MG: 5 TABLET, FILM COATED ORAL at 20:38

## 2022-12-22 RX ADMIN — SUCRALFATE 1 G: 1 TABLET ORAL at 16:37

## 2022-12-22 RX ADMIN — SUCRALFATE 1 G: 1 TABLET ORAL at 12:56

## 2022-12-22 RX ADMIN — MIRTAZAPINE 7.5 MG: 15 TABLET, FILM COATED ORAL at 20:39

## 2022-12-22 RX ADMIN — CARBIDOPA AND LEVODOPA 2 TABLET: 25; 100 TABLET, EXTENDED RELEASE ORAL at 09:12

## 2022-12-22 RX ADMIN — ANTI-FUNGAL POWDER MICONAZOLE NITRATE TALC FREE 1 EACH: 1.42 POWDER TOPICAL at 09:16

## 2022-12-22 RX ADMIN — MIDODRINE HYDROCHLORIDE 2.5 MG: 2.5 TABLET ORAL at 12:56

## 2022-12-22 RX ADMIN — DILTIAZEM HYDROCHLORIDE 10 MG: 5 INJECTION, SOLUTION INTRAVENOUS at 13:28

## 2022-12-22 RX ADMIN — BUDESONIDE 500 MCG: 0.5 SUSPENSION RESPIRATORY (INHALATION) at 06:27

## 2022-12-22 RX ADMIN — DIGOXIN 125 MCG: 0.25 INJECTION INTRAMUSCULAR; INTRAVENOUS at 22:04

## 2022-12-22 RX ADMIN — BUDESONIDE 500 MCG: 0.5 SUSPENSION RESPIRATORY (INHALATION) at 18:43

## 2022-12-22 RX ADMIN — ROPINIROLE HYDROCHLORIDE 1 MG: 1 TABLET, FILM COATED ORAL at 20:38

## 2022-12-22 RX ADMIN — MIDODRINE HYDROCHLORIDE 2.5 MG: 2.5 TABLET ORAL at 09:11

## 2022-12-22 RX ADMIN — SUCRALFATE 1 G: 1 TABLET ORAL at 09:11

## 2022-12-22 RX ADMIN — CARBIDOPA AND LEVODOPA 2 TABLET: 25; 100 TABLET, EXTENDED RELEASE ORAL at 14:51

## 2022-12-22 RX ADMIN — LORAZEPAM 0.5 MG: 0.5 TABLET ORAL at 22:45

## 2022-12-22 RX ADMIN — SUCRALFATE 1 G: 1 TABLET ORAL at 20:38

## 2022-12-22 RX ADMIN — ANTI-FUNGAL POWDER MICONAZOLE NITRATE TALC FREE 1 EACH: 1.42 POWDER TOPICAL at 20:48

## 2022-12-22 RX ADMIN — ARFORMOTEROL TARTRATE 15 MCG: 15 SOLUTION RESPIRATORY (INHALATION) at 18:43

## 2022-12-22 RX ADMIN — MIDODRINE HYDROCHLORIDE 5 MG: 5 TABLET ORAL at 16:41

## 2022-12-22 RX ADMIN — ATORVASTATIN CALCIUM 20 MG: 10 TABLET, FILM COATED ORAL at 20:38

## 2022-12-22 RX ADMIN — BUMETANIDE 2 MG: 1 TABLET ORAL at 09:11

## 2022-12-22 RX ADMIN — ARFORMOTEROL TARTRATE 15 MCG: 15 SOLUTION RESPIRATORY (INHALATION) at 06:27

## 2022-12-22 RX ADMIN — CARBIDOPA AND LEVODOPA 2 TABLET: 25; 100 TABLET, EXTENDED RELEASE ORAL at 20:39

## 2022-12-22 RX ADMIN — DIGOXIN 125 MCG: 250 INJECTION, SOLUTION INTRAMUSCULAR; INTRAVENOUS; PARENTERAL at 16:38

## 2022-12-22 RX ADMIN — PANTOPRAZOLE SODIUM 40 MG: 40 TABLET, DELAYED RELEASE ORAL at 09:11

## 2022-12-22 RX ADMIN — ROPINIROLE HYDROCHLORIDE 1 MG: 1 TABLET, FILM COATED ORAL at 14:52

## 2022-12-22 RX ADMIN — Medication 10 ML: at 20:39

## 2022-12-22 ASSESSMENT — PAIN SCALES - GENERAL: PAINLEVEL_OUTOF10: 0

## 2022-12-22 NOTE — PROGRESS NOTES
[facility]) Physicians        CARDIOLOGY                 INPATIENT PROGRESS NOTE          PATIENT SEEN IN FOLLOW UP FOR: CHF    Hospital Day: 4     Aleta Olivia is a 67year old patient known to Dr. Roberto Santillan: Denies any Cp, breathing better, No orthopnea \" I can move my feet now\"    ROS: Review of rest of 10 systems negative except as mentioned above    OBJECTIVE: No acute distress. See Assessment     Diagnostics:       Telemetry:  Reviewed        Intake/Output Summary (Last 24 hours) at 12/22/2022 1620  Last data filed at 12/22/2022 1542  Gross per 24 hour   Intake 480 ml   Output 600 ml   Net -120 ml       Labs:   CBC:   Recent Labs     12/21/22  0703 12/22/22  0451   WBC 6.1 6.0   HGB 8.6* 8.5*   HCT 31.3* 30.5*    200     BMP:   Recent Labs     12/21/22  0703 12/22/22  0451    143   K 4.5 4.3   CO2 31* 32*   BUN 47* 45*   CREATININE 1.7* 1.5*   LABGLOM 31 37   CALCIUM 8.8 8.5*     Mag: No results for input(s): MG in the last 72 hours. Phos: No results for input(s): PHOS in the last 72 hours. TSH: No results for input(s): TSH in the last 72 hours. HgA1c:     BNP: No results for input(s): BNP in the last 72 hours. PT/INR: No results for input(s): PROTIME, INR in the last 72 hours. APTT:No results for input(s): APTT in the last 72 hours. CARDIAC ENZYMES:  No results for input(s): CKTOTAL, TROPHS in the last 72 hours.     FASTING LIPID PANEL:  Lab Results   Component Value Date/Time    CHOL 95 05/12/2022 01:44 AM    HDL 38 05/12/2022 01:44 AM    LDLCALC 38 05/12/2022 01:44 AM    TRIG 93 05/12/2022 01:44 AM     LIVER PROFILE:  Recent Labs     12/21/22  0703 12/22/22  0451   AST 9 8   ALT <5 <5   LABALBU 3.0* 2.9*       Current Inpatient Medications:   bumetanide  2 mg Oral Daily    metoprolol succinate  25 mg Oral Daily    midodrine  2.5 mg Oral TID WC    apixaban  5 mg Oral BID    metoprolol  5 mg IntraVENous Once    atorvastatin  20 mg Oral Nightly carbidopa-levodopa  2 tablet Oral TID    sucralfate  1 g Oral 4x Daily    rOPINIRole  1 mg Oral TID    miconazole  1 each Topical BID    Arformoterol Tartrate  15 mcg Nebulization BID    budesonide  0.5 mg Nebulization BID    sodium chloride flush  5-40 mL IntraVENous 2 times per day    insulin lispro  0-4 Units SubCUTAneous TID WC    insulin lispro  0-4 Units SubCUTAneous Nightly    mirtazapine  7.5 mg Oral Nightly    pantoprazole  40 mg Oral QAM    insulin glargine  13 Units SubCUTAneous BID       IV Infusions (if any):   sodium chloride      dextrose           PHYSICAL EXAM:     CONSTITUTIONAL:   BP (!) 90/40   Pulse 90   Temp 98 °F (36.7 °C) (Oral)   Resp 18   Ht 5' (1.524 m)   Wt 256 lb (116.1 kg)   SpO2 100%   BMI 50.00 kg/m²   Pulse  Av.3  Min: 85  Max: 99  Systolic (15EJA), ZWV:34 , Min:92 , WXE:764    Diastolic (49WMU), ITF:17, Min:51, Max:65    CONST:  Well developed, appears stated age. Awake, alert and no apparent distress. HEENT:   Head- Normocephalic  Eyes- Conjunctivae pink, no icterus  Throat- Oral mucosa moist  Neck-  No stridor, + jugular venous distention. No carotid bruit. CHEST: Chest symmetrical and non-tender to palpation. No accessory muscle use  RESPIRATORY: Lung sounds - Few rhonchi  CARDIOVASCULAR:     Heart Inspection-OK  Heart Palpation- No thrills. Heart Ausculation- Irregularly irregular rate and rhythm, 2/6 systolic murmur. No s3 or rub   EXT: + lower extremity edema. Distal pulses palpable, no cyanosis   ABDOMEN: Soft, Bowel sounds present. No abdominal bruit  MS: n/a  : Deferred  RECTAL: Deferred  SKIN: Warm and dry   NEURO / PSYCH: Oriented to person, place; Good mood and affect. IMPRESSION / RECOMMENDATIONS:     Acute on Chronic HFpEF- (HCC) - EF 70-75% - Continue PO diuretics;  Monitor Wts, I/Os, BP, renal Fn - Add SGLT-2i when renal Fn OK     CKD 3b - Monitor renal Fn     Elevated Troponin  - Flat pattern likely from SHANTANU, CHF - No CP, EKG reviewed Paroxysmal Atrial fibrillation with rapid ventricular response (HCC) - give iv Cardizem for rate control, ZITA soft, can not increase her BB - OAC-Eliquis, Monitor H/H     Hypotension - Metoprolol dose reduced; Midodrine added, increase to 5mg TID     Chronic anemia - Monitor H/H     CAD: [facility] Jan 2022 ( 50% mid LAD, 60% Ostial Dx; 50% AV branch of left circumflex artery) - Continue low dose BB as her BP tolerates, and Statin. ASA discontinued due to Anemia and on Eliquis     Tricuspid valve regurgitation - Mild     Pulmonary hypertension, Moderate     Hyperlipidemia - On Statin     Type 2 diabetes mellitus - Per Dr Juan Bullock                 No family at bed side    Home 24 hrs if better. Above recommendations discussed with her.     F/u with LALITO Contreras 2-3 weeks after discharge      Electronically signed by Aleyda Rodriguez MD on 12/22/2022   [facility]) Cardiology

## 2022-12-22 NOTE — PROGRESS NOTES
([address]    Acute kidney injury ([address]    Pancytopenia ([address]    Thrombocytopenia ([address]    Encephalopathy    Acute respiratory failure with hypoxia (HCC)    Lactic acidosis    Delirium    Acute on chronic anemia    Pleural effusion, right    Acute respiratory failure with hypoxia and hypercapnia (HCC)    Acute confusion    Acute on chronic diastolic heart failure (HCC)    Macrocytosis    Other specified anemias    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[address]    Puerperal sepsis with acute hypoxic respiratory failure without septic shock (HCC)    Pulmonary HTN (HCC)    Chronic anticoagulation    RVF (right ventricular failure) (HCC)    Metabolic alkalosis    Sepsis (HCC)    COPD exacerbation (HCC)    Acute on chronic respiratory failure with hypercapnia (HCC)    Persistent atrial fibrillation (HCC)    Hypercapnic respiratory failure (HCC)    Acute on chronic respiratory failure (HCC)    Hyperkalemia    Heart failure (HCC)    Acute renal insufficiency    Hypotension        ASSESSMENT of Current Deficits Patient exhibits decreased strength, balance, and endurance impairing functional mobility, transfers, gait distance, and tolerance to activity. Patient motivated to work with therapy. Reports she hasn't stood in a week per patient report. Pt able to stand and take steps to chair with min A. 1 loss of balance when trying to sit in chair, moderate assist to correct. The patient will benefit from continued skilled therapy to increase strength and improve balance for safe functional mobility, to decrease risk of falls, and to meet goals at discharge.          PHYSICAL THERAPY  PLAN OF CARE       Physical therapy plan of care is established based on physician order,  patient diagnosis and clinical assessment    Current Treatment Recommendations:    -Bed Mobility: Lower extremity exercises , Upper extremity exercises , and Trunk control activities   -Sitting Balance: Incorporate reaching activities to activate trunk muscles , Hands on support to maintain midline , and Facilitate active trunk muscle engagement   -Standing Balance: Perform strengthening exercises in standing to promote motor control with or without upper extremity support   -Transfers: Provide instruction on proper hand and foot position for adequate transfer of weight onto lower extremities and use of gait device if needed and Cues for hand placement, technique and safety. Provide stabilization to prevent fall   -Gait: Gait training and Standing activities to improve: base of support, weight shift, weight bearing    -Endurance: Utilize Supervised activities to increase level of endurance to allow for safe functional mobility including transfers and gait     PT long term treatment goals are located in below grid    Patient and or family understand(s) diagnosis, prognosis, and plan of care. Frequency of treatments: Patient will be seen  daily.          Prior Level of Function: Patient ambulated with wheeled walker   Rehab Potential: good  for baseline    Past medical history:   Past Medical History:   Diagnosis Date    A-fib ([facility]     Acute on chronic congestive heart failure (HCC)     Anxiety     Asthma     CAD (coronary artery disease) 01/21/2016    Cancer ([facility]  breast ca 2006    right lumpectomy    Chronic kidney disease     nephrolithiasis    COPD exacerbation ([facility] 10/12/2022    Depression     Diabetes mellitus ([address]     H/O mammogram     Hx MRSA infection     toe infection january 2012    Hyperlipidemia     Hypertension     Lateral epicondylitis     Morbid obesity (HCC)     SERENA on CPAP     Oxygen dependent     Parkinson's disease ([address]     Tubal ligation status      Past Surgical History:   Procedure Laterality Date    BREAST LUMPECTOMY      BREAST REDUCTION SURGERY      CARDIAC CATHETERIZATION  4/28/2014    Dr. Day Hines  01/11/2022    Dr. Karmen Florentino  7/29/15    CT GUIDED CHEST TUBE  7/8/2022    CT GUIDED CHEST TUBE 7/8/2022 Josué Horner MD [facility]    ENDOSCOPY, COLON, DIAGNOSTIC  7/19/15    GALLBLADDER SURGERY      LUMBAR LAMINECTOMY      TOE AMPUTATION      TONSILLECTOMY      UPPER GASTROINTESTINAL ENDOSCOPY      UPPER GASTROINTESTINAL ENDOSCOPY N/A 7/19/2022    EGD ESOPHAGOGASTRODUODENOSCOPY performed by Jen Caban MD at [address]:    Precautions: Up with assistance, falls , O2, Parkinson's disease     Social history: Patient lives in a skilled nursing facility [address] [place] [address] owned: [employer], [employer], [address] [place], [address] [facility] bed,      [address] [facility] Mobility Inpatient   How much difficulty turning over in bed?: A Little  How much difficulty sitting down on / standing up from a chair with arms?: A Little  How much difficulty moving from lying on back to sitting on side of bed?: A Little  How much help from another person moving to and from a bed to a chair?: A Little  How much help from another person needed to walk in hospital room?: Total  How much help from another person for climbing 3-5 steps with a railing?: Total  AM-PAC Inpatient Mobility Raw Score : 14  AM-PAC Inpatient T-Scale Score : 38.1  Mobility Inpatient CMS 0-100% Score: 61.29  Mobility Inpatient CMS G-Code Modifier : CL    Nursing cleared patient for PT evaluation. The admitting diagnosis and active problem list as listed above have been reviewed prior to the initiation of this evaluation. OBJECTIVE;   Initial Evaluation  Date: 12/22/2022 Treatment Date:     Short Term/ Long Term   Goals   Was pt agreeable to Eval/treatment? Yes  To be met in 4 days   Pain level   0/10       Bed Mobility    Rolling: Moderate assist of 1    Supine to sit: Moderate assist of 1    Sit to supine: Moderate assist of 1    Scooting: Moderate assist of 1    Rolling: Independent    Supine to sit:  Independent    Sit to supine: Independent    Scooting: Independent     Transfers Sit to stand: Minimal assist of 1 from EOB x3 reps and chair x1 rep   Sit to stand: Independent    Ambulation     5 steps using  wheeled walker with Minimal assist of 1   for walker control, balance, upright, and safety    40 feet using  wheeled walker with Modified Independent    Stair negotiation: ascended and descended   Not assessed          ROM Within functional limits    Increase range of motion 10% of affected joints    Strength BUE:  refer to OT eval  RLE:  4-/5  LLE:  4-/5  Increase strength in affected mm groups by 1/3 grade   Balance Sitting EOB:  good -  Dynamic Standing:  fair wheeled walker    Sitting EOB:  good   Dynamic Standing: good      Patient is Alert & Oriented x person, place, time, and situation and follows directions    Sensation:  Patient  denies numbness/tingling   Edema:  no   Endurance: fair  +, SPO2 ranging 92-93%, fatigues after moderate activity     Vitals:  3 liters nasal cannula   Blood Pressure at rest  Blood Pressure during session    Heart Rate at rest  Heart Rate during session    SPO2 at rest 97%  SPO2 during session 92-94%     Patient education  Patient educated on role of Physical Therapy, risks of immobility, safety and plan of care,  importance of mobility while in hospital , purse lip breathing, ankle pumps, quad set and glut set for edema control, blood clot prevention, importance and purpose of adaptive device and adjusted to proper height for the patient. , safety , and O2 line management and safety      Patient response to education:   Pt verbalized understanding Pt demonstrated skill Pt requires further education in this area   Yes Partial Yes      Treatment:  Patient practiced and was instructed/facilitated in the following treatment: Patient assisted to [facility], max assist x2. Pt assisted to EOB. Sat edge of bed 10 minutes with Supervision  to increase dynamic sitting balance and activity tolerance. Pt stood and took side steps.  Pt stood again after seated rest break on EOB and bed pan placed under pt. Pt assisted on and off and then took steps to Chair. Pt stood from chair and assisted back to sitting. Therapeutic Exercises:  not performed  x  reps. At end of session, patient in chair with alarm call light and phone within reach,  all lines and tubes intact, nursing notified. Patient would benefit from continued skilled Physical Therapy to improve functional independence and quality of life. Patient's/ family goals   Return to [facility]    Time in  1117  Time out  1147    Total Treatment Time  10 minutes    Evaluation time includes thorough review of current medical information, gathering information on past medical history/social history and prior level of function, completion of standardized testing/informal observation of tasks, assessment of data, and development of Plan of care and goals.      CPT codes:  Low Complexity PT evaluation (99005)  Therapeutic activities (38779)   10 minutes  1 unit(s)    Scotty Kiran PT

## 2022-12-22 NOTE — PROGRESS NOTES
[address] [place], [place] [address]ist Progress Note    Admitting Date and Time: 12/19/2022  8:48 AM  Admit Dx: Heart failure (HCC) [I50.9]  Acute renal insufficiency [N28.9]  Acute on chronic combined systolic and diastolic congestive heart failure ([address] [I50.43]  Anemia, unspecified type [D64.9]    Subjective:  Patient just finished up bed bath, reports some dyspnea with the exertion of rolling around in bed which she states in normal for her. Feels pretty much her baseline at this time. Has BIPAP at nursing facility which she states she is compliant with every night. Denies pain. However, after seeing patient, nurse called that patient had flipped into AFib with RVR after having her beta blocker dose held this morning for borderline low BP. PMH:  has a past medical history of A-fib ([facility] Acute on chronic congestive heart failure ([facility] [address] Anxiety, Asthma, CAD (coronary artery disease), Cancer ([facility] [address] Chronic kidney disease, COPD exacerbation ([facility] [address] Depression, Diabetes mellitus ([address] H/O mammogram, Hx MRSA infection, Hyperlipidemia, Hypertension, Lateral epicondylitis, Morbid obesity ([address] SERENA on CPAP, Oxygen dependent, Parkinson's disease ([address] and Tubal ligation status. REVIEW OF SYSTEMS:  no fevers, chills, cp, sob, n/v, ha, vision/hearing changes, wt changes, hot/cold flashes, other open skin lesions, diarrhea, constipation, dysuria/hematuria unless noted in HPI. Complete ROS performed with the patient and is otherwise negative.     Objective:    BP (!) 100/59   Pulse 96   Temp 98 °F (36.7 °C) (Infrared)   Resp 26   Ht 5' (1.524 m)   Wt 260 lb (117.9 kg)   SpO2 97%   BMI 50.78 kg/m²     PHYSICAL EXAM  General Appearance: alert and oriented to person, place and time and in no acute distress  Skin: warm and dry  Head: normocephalic and atraumatic  Eyes: PERRL, EOMI, conjunctivae normal  Neck: neck supple and non tender without mass   Pulmonary/Chest: clear to auscultation bilaterally- no Patient wanting to leave, [facility]. wheezes, rales or rhonchi, normal air movement, no respiratory distress. Tachypneic in 20s. Currently on baseline O2 2L NC  Cardiovascular: normal rate, normal S1 and S2 and no carotid bruits  Abdomen: soft, round, non-tender, non-distended, normal bowel sounds, no masses or organomegaly  Extremities: no cyanosis, no clubbing. 2+ pitting edema to bilat LE's  Neurologic: no cranial nerve deficit and speech normal    Recent Labs     12/20/22  0506 12/21/22  0703 12/22/22  0451    142 143   K 4.9 4.5 4.3    100 103   CO2 32* 31* 32*   BUN 50* 47* 45*   CREATININE 1.8* 1.7* 1.5*   GLUCOSE 98 80 130*   CALCIUM 8.6 8.8 8.5*     Recent Labs     12/20/22  0506 12/21/22  0703 12/22/22  0451   WBC 5.1 6.1 6.0   RBC 3.00* 3.33* 3.24*   HGB 8.0* 8.6* 8.5*   HCT 28.0* 31.3* 30.5*   MCV 93.3 94.0 94.1   MCH 26.7 25.8* 26.2   MCHC 28.6* 27.5* 27.9*   RDW 17.9* 17.8* 17.6*    227 200   MPV 10.4 10.3 9.9         Assessment & Plan:    HFpEF with exacerbation   7/2022 ECHO with EF of 70-75%, indeterminate diastolic dysfunction  Initial BNP >15k. Still with pitting edema in bilat LE's through knees, but not as tight as previous exams. Cardiology following, lasix 40 mg IV daily. 1800ml FR     A-fib RVR   TVR- mild  initial rate 120's-160's  Currently CVR  Cardiology following, reduced home Toprol XL from 100mg BID to 25mg daily and was held this am for borderline low BP resulting in patient going into AFib with RVR again. Resumed home Eliquis. Continue home asa. Continuous telemetry. Monitor hgb. Cardiology recommends f/u with Dr Knute Epley 2-3 weeks after discharge    CAD  HLD  Continue home statin    Hypertension- with hypotension  Cardiology added midodrine 2.5mg TID. BP improved today. Cardiology following, metoprolol reduced     Chronic anemia   Initial Hgb 9.0-at/near baseline  Hgb today 8.0. Monitor     DMII   A1c 6.8  Continue home Lantus. AC, HS BS checks with low-dose SSI coverage. Hypoglycemia protocol. 4 carb diet     SHANTANU on CKD 3b   Initial Cr 2.0 (baseline 1.4-1.7)  Cr 2.0-->1.8-->1.7. Monitor kidney function closely as daily IV diuretic started today. Currently at beaseline, but if Cr rises- may need to consult nephrology for assistance with diuresis in the setting of SHANTANU. Elevated troponins with negative delta   89, 92  Pt denies chest pain. Likely 2/2 vol overload. Cardiology following. Chronic hypoxic and hypercapneic respiratory failure with COPD  SERENA on Cpap, likely obesity hypoventilation syndrome as well  Moderate Pulmonary Hypertension  Patient on home 3L O2 via NC, currently on 2L saturating well  Monitor O2  BMI 50.78, lifestyle modification counseling, weight loss     HLD 5/2022 Lipid panel WNL  Anxiety  Home bipap  Continue home statin  Resume ativan 0.5mg nightly. OARRS reviewed. Code Status: Full Code  DVT prophylaxis: Eliquis    Disposition: Anticipate return to [facility] 12/23 if heart rate controlled     30 minutes or more spent reviewing patient chart, assessing patient, discussing plan of care with patient and family, discussing plan of care with collaborating physician, and documentation. NOTE: This report was transcribed using voice recognition software. Every effort was made to ensure accuracy; however, inadvertent computerized transcription errors may be present.   Electronically signed by Mauricio Daley DO on 12/22/2022 at 8:37 AM

## 2022-12-22 NOTE — PLAN OF CARE
Problem: Discharge Planning  Goal: Discharge to home or other facility with appropriate resources  Outcome: Progressing  Flowsheets (Taken 12/22/2022 2098)  Discharge to home or other facility with appropriate resources: Identify barriers to discharge with patient and caregiver     Problem: Skin/Tissue Integrity  Goal: Absence of new skin breakdown  Description: 1. Monitor for areas of redness and/or skin breakdown  2. Assess vascular access sites hourly  3. Every 4-6 hours minimum:  Change oxygen saturation probe site  4. Every 4-6 hours:  If on nasal continuous positive airway pressure, respiratory therapy assess nares and determine need for appliance change or resting period.   Outcome: Progressing     Problem: ABCDS Injury Assessment  Goal: Absence of physical injury  Outcome: Progressing     Problem: Safety - Adult  Goal: Free from fall injury  Outcome: Progressing

## 2022-12-22 NOTE — PROGRESS NOTES
GASTROINTESTINAL ENDOSCOPY      UPPER GASTROINTESTINAL ENDOSCOPY N/A 7/19/2022    EGD ESOPHAGOGASTRODUODENOSCOPY performed by Marvin Aguillon MD at [facility]      Precautions:  Fall Risk, up with assistance, 3L O2 via nasal canula      Assessment of current deficits    [x] Functional mobility  [x]ADLs  [x] Strength               []Cognition    [x] Functional transfers   [x] IADLs         [x] Safety Awareness   [x]Endurance    [] Fine Coordination              [x] Balance      [] Vision/perception   []Sensation     []Gross Motor Coordination  [] ROM  [] Delirium                   [] Motor Control     OT PLAN OF CARE   OT POC based on physician orders, patient diagnosis and results of clinical assessment    Frequency/Duration 1-3 days/wk for 2 weeks  PRN     Specific OT Treatment Interventions to include:   * Instruction/training on adapted ADL techniques and AE recommendations to increase functional independence within precautions       * Training on energy conservation strategies, correct breathing pattern and techniques to improve independence/tolerance for self-care routine  * Functional transfer/mobility training/DME recommendations for increased independence, safety, and fall prevention  * Patient/Family education to increase follow through with safety techniques and functional independence  * Recommendation of environmental modifications for increased safety with functional transfers/mobility and ADLs  * Therapeutic exercise to improve motor endurance, ROM, and functional strength for ADLs/functional transfers  * Therapeutic activities to facilitate/challenge dynamic balance, stand tolerance for increased safety and independence with ADLs  * Positioning to improve skin integrity, interaction with environment and functional independence    Recommended Adaptive Equipment: TBD     Home Living: Pt presented from skilled nursing facility.      Equipment owned: Nursing home equipment     Prior Level of Function: Pt requires assist with ADLs , dependent with IADLs; ambulated with wheeled walker short distances , transfers to wheelchair independently. Pain Level: pt denied pain; Nursing notified. Cognition: A&O: 4/4; Follows 3 step directions   Memory: intact   Sequencing: intact    Problem solving: fair    Judgement/safety: fair     [facility]   AM-PAC Daily Activity Inpatient   How much help for putting on and taking off regular lower body clothing?: A Lot  How much help for Bathing?: A Lot  How much help for Toileting?: A Lot  How much help for putting on and taking off regular upper body clothing?: A Little  How much help for taking care of personal grooming?: A Little  How much help for eating meals?: A Little  AM-[facility] Inpatient Daily Activity Raw Score: 15  AM-PAC Inpatient ADL T-Scale Score : 34.69  ADL Inpatient CMS 0-100% Score: 56.46  ADL Inpatient CMS G-Code Modifier : CK     Functional Assessment:    Initial Eval Status  Date: 12/22/22   Treatment Status  Date: STGs = LTGs  Time frame: 10-14 days   Feeding Supervision     Independent    Grooming Stand by Assist     Supervision    UB Dressing Minimal Assist    Supervision    LB Dressing Moderate Assist     Supervision    Bathing Moderate Assist    Supervision    Toileting Moderate Assist     Supervision    Bed Mobility  Supine to sit:  Not Assessed; in chair   Sit to supine:  Not Assessed; in chair     Supine to sit: Independent   Sit to supine: Independent    Functional Transfers Sit to stand: Minimal Assist   Stand to sit: Minimal Assist     Transfer training with verbal cues for hand placement throughout session to improve safety. Independent    Functional Mobility Minimal Assist with wheeled walker to improve balance less 2-3 forward/backward steps, verbal cues for walker sequence and safety.      Moderate Emmons with use of wheeled walker    Balance Sitting:     Static: fair plus    Dynamic: fair plus  Standing: fair plus  with walker     Sitting: Static: good    Dynamic: good  Standing: good with walker    Activity Tolerance fair    Increase standing tolerance >3  minutes for improved engagement with functional transfers and indep in ADLs     Visual/  Perceptual Glasses: yes     Reports changes in vision since admission: no      NA      Hand Dominance: right      AROM (PROM) Strength Additional Info:  Goal:   RUE  WFL 3+/5 good  and wfl FMC/dexterity noted during ADL tasks   Improve overall RUE strength  for participation in functional tasks   LUE WFL 3+/5 good  and wfl FMC/dexterity noted during ADL tasks   Improve overall LUE strength  for participation in functional tasks     Hearing: [facility]   Sensation:  No c/o numbness or tingling  Tone:  WFL   Edema: B LEs      Vitals:  HR at rest: 112 bpm HR with activity: 140s-160s bpm HR at end of session: 117 bpm   SpO2 at rest: 95% SpO2 with activity: 94-95% SpO2 at end of session: 95%   BP at rest:  BP with activity:  BP at end of session:      Comments: RN cleared patient for OT. Upon arrival patient in chair. Therapist facilitated and instructed pt on adapted  techniques & compensatory strategies to improve safety and independence with basic ADLs, bed mobility, functional transfers and mobility to allow pt to achieve highest level of independence and safely. Pt demonstrated fair plus understanding of education & follow through. At end of session, patient was in chair with call light and phone within reach, all lines and tubes intact. Overall, patient demonstrated  decreased independence and safety during completion of ADL tasks. Pt would benefit from continued skilled OT to increase safety and independence with completion of ADL tasks and functional mobility for improved quality of life. Rehab Potential: Good for established goals. Patient / Family Goal: not stated       Patient and/or family were instructed on functional diagnosis, prognosis/goals and OT plan of care.  Demonstrated fair

## 2022-12-23 LAB
ANION GAP SERPL CALCULATED.3IONS-SCNC: 7 MMOL/L (ref 7–16)
ANISOCYTOSIS: ABNORMAL
BASOPHILIC STIPPLING: ABNORMAL
BASOPHILS ABSOLUTE: 0.06 E9/L (ref 0–0.2)
BASOPHILS RELATIVE PERCENT: 0.9 % (ref 0–2)
BUN BLDV-MCNC: 36 MG/DL (ref 6–23)
CALCIUM SERPL-MCNC: 8.7 MG/DL (ref 8.6–10.2)
CHLORIDE BLD-SCNC: 102 MMOL/L (ref 98–107)
CO2: 37 MMOL/L (ref 22–29)
CREAT SERPL-MCNC: 1.4 MG/DL (ref 0.5–1)
DIGOXIN LEVEL: 2.8 NG/ML (ref 0.8–2)
EOSINOPHILS ABSOLUTE: 0 E9/L (ref 0.05–0.5)
EOSINOPHILS RELATIVE PERCENT: 1.6 % (ref 0–6)
GFR SERPL CREATININE-BSD FRML MDRD: 40 ML/MIN/1.73
GLUCOSE BLD-MCNC: 78 MG/DL (ref 74–99)
HCT VFR BLD CALC: 31.5 % (ref 34–48)
HEMOGLOBIN: 8.8 G/DL (ref 11.5–15.5)
HYPOCHROMIA: ABNORMAL
LYMPHOCYTES ABSOLUTE: 2.08 E9/L (ref 1.5–4)
LYMPHOCYTES RELATIVE PERCENT: 33 % (ref 20–42)
MCH RBC QN AUTO: 26 PG (ref 26–35)
MCHC RBC AUTO-ENTMCNC: 27.9 % (ref 32–34.5)
MCV RBC AUTO: 93.2 FL (ref 80–99.9)
METER GLUCOSE: 129 MG/DL (ref 74–99)
METER GLUCOSE: 140 MG/DL (ref 74–99)
METER GLUCOSE: 205 MG/DL (ref 74–99)
METER GLUCOSE: 73 MG/DL (ref 74–99)
MONOCYTES ABSOLUTE: 0.25 E9/L (ref 0.1–0.95)
MONOCYTES RELATIVE PERCENT: 3.5 % (ref 2–12)
NEUTROPHILS ABSOLUTE: 3.97 E9/L (ref 1.8–7.3)
NEUTROPHILS RELATIVE PERCENT: 62.6 % (ref 43–80)
OVALOCYTES: ABNORMAL
PDW BLD-RTO: 17.4 FL (ref 11.5–15)
PLATELET # BLD: 224 E9/L (ref 130–450)
PMV BLD AUTO: 10.5 FL (ref 7–12)
POIKILOCYTES: ABNORMAL
POLYCHROMASIA: ABNORMAL
POTASSIUM REFLEX MAGNESIUM: 4.2 MMOL/L (ref 3.5–5)
RBC # BLD: 3.38 E12/L (ref 3.5–5.5)
SODIUM BLD-SCNC: 146 MMOL/L (ref 132–146)
TEAR DROP CELLS: ABNORMAL
WBC # BLD: 6.3 E9/L (ref 4.5–11.5)

## 2022-12-23 PROCEDURE — 2700000000 HC OXYGEN THERAPY PER DAY

## 2022-12-23 PROCEDURE — 82962 GLUCOSE BLOOD TEST: CPT

## 2022-12-23 PROCEDURE — APPSS30 APP SPLIT SHARED TIME 16-30 MINUTES: Performed by: NURSE PRACTITIONER

## 2022-12-23 PROCEDURE — 80162 ASSAY OF DIGOXIN TOTAL: CPT

## 2022-12-23 PROCEDURE — 6370000000 HC RX 637 (ALT 250 FOR IP): Performed by: INTERNAL MEDICINE

## 2022-12-23 PROCEDURE — 1200000000 HC SEMI PRIVATE

## 2022-12-23 PROCEDURE — 6360000002 HC RX W HCPCS: Performed by: NURSE PRACTITIONER

## 2022-12-23 PROCEDURE — 94640 AIRWAY INHALATION TREATMENT: CPT

## 2022-12-23 PROCEDURE — 85025 COMPLETE CBC W/AUTO DIFF WBC: CPT

## 2022-12-23 PROCEDURE — 6360000002 HC RX W HCPCS: Performed by: INTERNAL MEDICINE

## 2022-12-23 PROCEDURE — 6370000000 HC RX 637 (ALT 250 FOR IP): Performed by: NURSE PRACTITIONER

## 2022-12-23 PROCEDURE — 36415 COLL VENOUS BLD VENIPUNCTURE: CPT

## 2022-12-23 PROCEDURE — 2580000003 HC RX 258: Performed by: NURSE PRACTITIONER

## 2022-12-23 PROCEDURE — 80048 BASIC METABOLIC PNL TOTAL CA: CPT

## 2022-12-23 PROCEDURE — 94660 CPAP INITIATION&MGMT: CPT

## 2022-12-23 PROCEDURE — 6370000000 HC RX 637 (ALT 250 FOR IP): Performed by: STUDENT IN AN ORGANIZED HEALTH CARE EDUCATION/TRAINING PROGRAM

## 2022-12-23 RX ADMIN — CARBIDOPA AND LEVODOPA 2 TABLET: 25; 100 TABLET, EXTENDED RELEASE ORAL at 08:08

## 2022-12-23 RX ADMIN — ATORVASTATIN CALCIUM 20 MG: 10 TABLET, FILM COATED ORAL at 20:40

## 2022-12-23 RX ADMIN — DIGOXIN 250 MCG: 0.25 INJECTION INTRAMUSCULAR; INTRAVENOUS at 00:49

## 2022-12-23 RX ADMIN — ROPINIROLE HYDROCHLORIDE 1 MG: 1 TABLET, FILM COATED ORAL at 15:44

## 2022-12-23 RX ADMIN — BUDESONIDE 500 MCG: 0.5 SUSPENSION RESPIRATORY (INHALATION) at 17:46

## 2022-12-23 RX ADMIN — DIGOXIN 250 MCG: 0.25 INJECTION INTRAMUSCULAR; INTRAVENOUS at 12:36

## 2022-12-23 RX ADMIN — INSULIN GLARGINE 13 UNITS: 100 INJECTION, SOLUTION SUBCUTANEOUS at 09:01

## 2022-12-23 RX ADMIN — MIDODRINE HYDROCHLORIDE 5 MG: 5 TABLET ORAL at 08:08

## 2022-12-23 RX ADMIN — ARFORMOTEROL TARTRATE 15 MCG: 15 SOLUTION RESPIRATORY (INHALATION) at 07:09

## 2022-12-23 RX ADMIN — MIDODRINE HYDROCHLORIDE 5 MG: 5 TABLET ORAL at 12:35

## 2022-12-23 RX ADMIN — APIXABAN 5 MG: 5 TABLET, FILM COATED ORAL at 20:40

## 2022-12-23 RX ADMIN — ROPINIROLE HYDROCHLORIDE 1 MG: 1 TABLET, FILM COATED ORAL at 20:39

## 2022-12-23 RX ADMIN — SUCRALFATE 1 G: 1 TABLET ORAL at 20:39

## 2022-12-23 RX ADMIN — SUCRALFATE 1 G: 1 TABLET ORAL at 08:08

## 2022-12-23 RX ADMIN — SUCRALFATE 1 G: 1 TABLET ORAL at 18:06

## 2022-12-23 RX ADMIN — Medication 10 ML: at 08:15

## 2022-12-23 RX ADMIN — MIDODRINE HYDROCHLORIDE 5 MG: 5 TABLET ORAL at 18:06

## 2022-12-23 RX ADMIN — ACETAMINOPHEN 650 MG: 325 TABLET ORAL at 10:41

## 2022-12-23 RX ADMIN — DIGOXIN 250 MCG: 0.25 INJECTION INTRAMUSCULAR; INTRAVENOUS at 08:09

## 2022-12-23 RX ADMIN — METOPROLOL SUCCINATE 25 MG: 25 TABLET, EXTENDED RELEASE ORAL at 08:07

## 2022-12-23 RX ADMIN — ARFORMOTEROL TARTRATE 15 MCG: 15 SOLUTION RESPIRATORY (INHALATION) at 17:46

## 2022-12-23 RX ADMIN — BUMETANIDE 2 MG: 1 TABLET ORAL at 08:08

## 2022-12-23 RX ADMIN — APIXABAN 5 MG: 5 TABLET, FILM COATED ORAL at 08:08

## 2022-12-23 RX ADMIN — ROPINIROLE HYDROCHLORIDE 1 MG: 1 TABLET, FILM COATED ORAL at 08:08

## 2022-12-23 RX ADMIN — SUCRALFATE 1 G: 1 TABLET ORAL at 12:35

## 2022-12-23 RX ADMIN — CARBIDOPA AND LEVODOPA 2 TABLET: 25; 100 TABLET, EXTENDED RELEASE ORAL at 15:45

## 2022-12-23 RX ADMIN — MIRTAZAPINE 7.5 MG: 15 TABLET, FILM COATED ORAL at 20:40

## 2022-12-23 RX ADMIN — PANTOPRAZOLE SODIUM 40 MG: 40 TABLET, DELAYED RELEASE ORAL at 08:08

## 2022-12-23 RX ADMIN — INSULIN GLARGINE 13 UNITS: 100 INJECTION, SOLUTION SUBCUTANEOUS at 20:40

## 2022-12-23 RX ADMIN — CARBIDOPA AND LEVODOPA 2 TABLET: 25; 100 TABLET, EXTENDED RELEASE ORAL at 20:39

## 2022-12-23 RX ADMIN — ANTI-FUNGAL POWDER MICONAZOLE NITRATE TALC FREE 1 EACH: 1.42 POWDER TOPICAL at 08:15

## 2022-12-23 RX ADMIN — BUDESONIDE 500 MCG: 0.5 SUSPENSION RESPIRATORY (INHALATION) at 07:09

## 2022-12-23 ASSESSMENT — PAIN SCALES - WONG BAKER
WONGBAKER_NUMERICALRESPONSE: 2
WONGBAKER_NUMERICALRESPONSE: 2

## 2022-12-23 ASSESSMENT — PAIN SCALES - GENERAL
PAINLEVEL_OUTOF10: 10
PAINLEVEL_OUTOF10: 0
PAINLEVEL_OUTOF10: 3

## 2022-12-23 ASSESSMENT — PAIN DESCRIPTION - LOCATION: LOCATION: HIP

## 2022-12-23 ASSESSMENT — PAIN DESCRIPTION - ORIENTATION: ORIENTATION: RIGHT

## 2022-12-23 NOTE — PLAN OF CARE
Patient's chart updated to reflect:      . - HF care plan, HF education points and HF discharge instructions.  -Orders: 2 gram sodium diet, daily weights, I/O.  -PCP and/or Cardiologist appointment to be scheduled within 7 days of hospital discharge.  -History of HF, not primary admission Dx.   Patient admitted for treatment of  Suellen Barraza RN RN, BSN  Heart Failure Navigator

## 2022-12-23 NOTE — PROGRESS NOTES
[facility]) Physicians        CARDIOLOGY                 INPATIENT PROGRESS NOTE          PATIENT SEEN IN FOLLOW UP FOR: CHF    Hospital Day: 5     Conor Tracey is a 67year old patient known to Dr. Don Crum: Denies any chest pain; breathing better, No orthopnea    ROS: Review of rest of 10 systems negative except as mentioned above    OBJECTIVE: No acute distress. See Assessment     Diagnostics:       Telemetry:  Reviewed        Intake/Output Summary (Last 24 hours) at 12/23/2022 1547  Last data filed at 12/23/2022 1342  Gross per 24 hour   Intake 600 ml   Output 1500 ml   Net -900 ml       Labs:   CBC:   Recent Labs     12/22/22  0451 12/23/22  0524   WBC 6.0 6.3   HGB 8.5* 8.8*   HCT 30.5* 31.5*    224     BMP:   Recent Labs     12/22/22 0451 12/23/22 0524    146   K 4.3 4.2   CO2 32* 37*   BUN 45* 36*   CREATININE 1.5* 1.4*   LABGLOM 37 40   CALCIUM 8.5* 8.7     Mag: No results for input(s): MG in the last 72 hours. Phos: No results for input(s): PHOS in the last 72 hours. TSH: No results for input(s): TSH in the last 72 hours. HgA1c:     BNP: No results for input(s): BNP in the last 72 hours. PT/INR: No results for input(s): PROTIME, INR in the last 72 hours. APTT:No results for input(s): APTT in the last 72 hours. CARDIAC ENZYMES:  No results for input(s): CKTOTAL, TROPHS in the last 72 hours.     FASTING LIPID PANEL:  Lab Results   Component Value Date/Time    CHOL 95 05/12/2022 01:44 AM    HDL 38 05/12/2022 01:44 AM    LDLCALC 38 05/12/2022 01:44 AM    TRIG 93 05/12/2022 01:44 AM     LIVER PROFILE:  Recent Labs     12/21/22  0703 12/22/22  0451   AST 9 8   ALT <5 <5   LABALBU 3.0* 2.9*       Current Inpatient Medications:   midodrine  5 mg Oral TID WC    bumetanide  2 mg Oral Daily    metoprolol succinate  25 mg Oral Daily    apixaban  5 mg Oral BID    metoprolol  5 mg IntraVENous Once    atorvastatin  20 mg Oral Nightly    carbidopa-levodopa  2 tablet Oral TID    sucralfate  1 g Oral 4x Daily    rOPINIRole  1 mg Oral TID    miconazole  1 each Topical BID    Arformoterol Tartrate  15 mcg Nebulization BID    budesonide  0.5 mg Nebulization BID    sodium chloride flush  5-40 mL IntraVENous 2 times per day    insulin lispro  0-4 Units SubCUTAneous TID WC    insulin lispro  0-4 Units SubCUTAneous Nightly    mirtazapine  7.5 mg Oral Nightly    pantoprazole  40 mg Oral QAM    insulin glargine  13 Units SubCUTAneous BID       IV Infusions (if any):   sodium chloride      dextrose           PHYSICAL EXAM:     CONSTITUTIONAL:   BP (!) 90/40   Pulse 90   Temp 98 °F (36.7 °C) (Oral)   Resp 18   Ht 5' (1.524 m)   Wt 256 lb (116.1 kg)   SpO2 100%   BMI 50.00 kg/m²   Pulse  Av.3  Min: 83  Max: 626  Systolic (56SOF), XLY:229 , Min:107 , NPB:298    Diastolic (26OMV), MPF:37, Min:57, Max:72    CONST:  Well developed, appears stated age. Awake, alert and no apparent distress. HEENT:   Head- Normocephalic  Eyes- Conjunctivae pink, no icterus  Throat- Oral mucosa moist  Neck-  No stridor, ? jugular venous distention. No carotid bruit. CHEST: Chest symmetrical and non-tender to palpation. No accessory muscle use  RESPIRATORY: Lung sounds - Few rhonchi  CARDIOVASCULAR:     Heart Palpation- No thrills. Heart Ausculation- Irregularly irregular rate and rhythm, 2/6 systolic murmur. No s3 or rub   EXT: + lower extremity edema. Distal pulses palpable, no cyanosis   ABDOMEN: Soft, Bowel sounds present. No abdominal bruit  MS: n/a  : Deferred  RECTAL: Deferred  SKIN: Warm and dry   NEURO / PSYCH: Oriented to person, place;. IMPRESSION / RECOMMENDATIONS:     Acute on Chronic HFpEF- (HCC) - EF 70-75% - Continue PO diuretics;  Monitor Wts, I/Os, BP, renal Fn - Add SGLT-2i later if renal Fn OK     CKD 3b - Monitor renal Fn     Elevated Troponin  - Flat pattern likely from SHANTANU, CHF - No CP, EKG reviewed     Paroxysmal Atrial fibrillation with rapid ventricular response ([address] - give iv Cardizem for rate control, ZITA soft, can not increase her BB - OAC-Eliquis, Monitor H/H     Hypotension - Metoprolol dose reduced; Midodrine added, increase to 5mg TID     Chronic anemia - Monitor H/H     CAD: [facility] Jan 2022 ( 50% mid LAD, 60% Ostial Dx; 50% AV branch of left circumflex artery) - Continue low dose BB as her BP tolerates, and Statin. ASA discontinued due to Anemia and on Eliquis     Tricuspid valve regurgitation - Mild     Pulmonary hypertension, Moderate     Hyperlipidemia - On Statin     Type 2 diabetes mellitus - Per Dr Cesar Queen                 No family at bed side    OK to discharge    Above recommendations discussed with her.     F/u with LALITO Contreras 2-3 weeks after discharge      Electronically signed by Sindhu Joseph MD on 12/23/2022   [facility]) Cardiology

## 2022-12-23 NOTE — DISCHARGE INSTRUCTIONS
SYMPTOMS:  You have a weight gain of 3 pounds or more in 1 day         OR 5 pounds or more in one week  More shortness of breath  More swelling of your stomach, legs, ankles or feet  Feeling more tired, No energy  Dry hacky cough  Dizziness  More chest pain / discomfort       (CALL 911 IF ANY OF THE FOLLOWING OCCURS  Chest pain (not relieved with nitroglycerine, if you have been prescribed this medication)  Severe shortness of breath  Faint / Pass out  Confusion / cannot think clearly  If symptoms get worse           SMOKING - TOBACCO USE:  * IF YOU SMOKE - STOP! Kick the habit. [address] Program is offered at [facility] and [address] [facility]. Call [phone] extension [phone] for more information. ACTIVITY:   (Ask your doctor when you will be able to return to work and before starting any exercise program.  Do not drive unless unless your doctor has given you permission to do so). Start light exercise. Even if you can only do a small amount, exercise will help you get stronger, have more energy, help manage your weight and decrease  stress. Walking is an easy way to get exercise. Start out slowly and  increase the amount you walk as tolerated  If you become short of breath, dizzy or have chest pain; stop, sit down, and rest.  If you feel \"wiped out\" the day after you exercise, walk at a slower pace or for a shorter distance. You can gradually increase the pace or amount of time. (Do not exercise right after a meal or in extreme temperatures, such as above 85 degrees, if the air is really humid, or wind chill is less than 20 degrees)                                             ADDITIONAL INFORMATION:  Avoid getting sick from colds and the flu. Stay away from friends or family that you know may have a contagious illness  Get plenty of rest   Get a flu shot each year. Get a pneumococcal vaccine shot.  If you have had one before, ask your doctor whether you need another dose. My Goal for Self-management of Heart Failure Includes 5 steps :    1. Notice a change in symptoms ( weight gain, short of breath, leg swelling, decreased activity level, bloating. ...)    2. Evaluate the change: (use the Heart Failure Zones )     3. Decide to take action: decide what your options are, such as: (call your doctor for an extra visit, take a prescribed medication, such as your water pill if your doctor has given you directions to do so, [address])    4. Come up with a strategy:  (now you call the doctor for advice / appointment. This is where you take action!!! Do not wait, catch the symptom early and treat it before it worsens. 5. Evaluate the response: The next day, check your Heart Failure Zones: are you in the GREEN ZONE (safe zone)? Worsening symptoms of YELLOW ZONE? Or have you moved to the RED ZONE and need to call 911 or go to the Emergency Room for evaluation? Call your doctor's office to update them on your symptoms of heart failure. Learning About Heart Failure Zones  What are heart failure zones? Heart failure zones give you an easy way to see changes in your heart failure symptoms. They also tell you when you need to get help. Check every day to see which zone you are in. Green zone. You are doing well. This is where you want to be. Your weight is stable. It's not going up or down. You breathe easily. You are sleeping well. You are able to lie flat without shortness of breath. You can do your usual activities. Yellow zone. Be careful. Your symptoms are changing. Call your doctor. You have new or increased shortness of breath. You are dizzy or lightheaded, or you feel like you may faint. You have sudden weight gain, such as more than 2 to 3 pounds in a day or 5 pounds in a week.  (Your doctor may suggest a different range of weight gain.)  You have increased swelling in your legs, ankles, or feet.  You are so tired or weak that you can't do your usual activities. You are not sleeping well. Shortness of breath wakes you up at night. You need extra pillows. Red zone. 911  This is an emergency. Call . You have symptoms of sudden heart failure. For example: You have severe trouble breathing. You cough up pink, foamy mucus. You have a new irregular or fast heartbeat. You have symptoms of a heart attack. These may include:  Chest pain or pressure, or a strange feeling in the chest.  Sweating. Shortness of breath. Nausea or vomiting. Pain, pressure, or a strange feeling in the back, neck, jaw, or upper belly or in one or both shoulders or arms. Lightheadedness or sudden weakness. A fast or irregular heartbeat. If you have symptoms of a heart attack 911 : After you call , the  may tell you to chew 1 adult-strength or 2 to 4 low-dose aspirin. Wait for an ambulance. Do not try to drive yourself. Follow-up care is a key part of your treatment and safety. Be sure to make and go to all appointments, and call your doctor if you are having problems. It's also a good idea to know your test results and keep a list of the medicines you take. Where can you learn more? Go to https://[employer]CredSimple. org and sign in to your [employer] account. Enter T174 in the [facility] box to learn more about \"Learning About Heart Failure Zones. \"     If you do not have an account, please click on the \"Sign Up Now\" link. Current as of: August 31, 2020               Content Version: 12.9  © 6469-7401 Healthwise, [employer]. Care instructions adapted under license by [facility] [employer] [facility] ([facility]). If you have questions about a medical condition or this instruction, always ask your healthcare professional. [address] any warranty or liability for your use of this information.

## 2022-12-23 NOTE — PROGRESS NOTES
[facility] Hospitalist Progress Note    Admitting Date and Time: 12/19/2022  8:48 AM  Admit Dx: Heart failure (HCC) [I50.9]  Acute renal insufficiency [N28.9]  Acute on chronic combined systolic and diastolic congestive heart failure (HCC) [I50.43]  Anemia, unspecified type [D64.9]    Subjective:  Patient seen & examined. Awake, A&O, sitting up in bed talking on speaker phone with daughter. Daughter voiced concerns about pt having digoxin, states pt was hospitalized over the summer for digoxin toxicity. Pt feeling much better, legs minimally tender today. Denies CP, SOB    PMH:  has a past medical history of A-fib ([address] Acute on chronic congestive heart failure ([address] Anxiety, Asthma, CAD (coronary artery disease), Cancer ([address] Chronic kidney disease, COPD exacerbation ([address] Depression, Diabetes mellitus ([address] H/O mammogram, Hx MRSA infection, Hyperlipidemia, Hypertension, Lateral epicondylitis, Morbid obesity ([address] SERENA on CPAP, Oxygen dependent, Parkinson's disease ([address] and Tubal ligation status. REVIEW OF SYSTEMS:  no fevers, chills, cp, sob, n/v, ha, vision/hearing changes, wt changes, hot/cold flashes, other open skin lesions, diarrhea, constipation, dysuria/hematuria unless noted in HPI. Complete ROS performed with the patient and is otherwise negative. Objective:    /70   Pulse 98   Temp 97.6 °F (36.4 °C) (Infrared)   Resp 25   Ht 5' (1.524 m)   Wt 261 lb (118.4 kg)   SpO2 98%   BMI 50.97 kg/m²     PHYSICAL EXAM  General Appearance: alert and oriented to person, place and time and in no acute distress  Skin: warm and dry  Head: normocephalic and atraumatic  Eyes: PERRL, EOMI, conjunctivae normal  Neck: neck supple and non tender without mass   Pulmonary/Chest: clear to auscultation bilaterally- no wheezes, rales or rhonchi, normal air movement, no respiratory distress. Tachypneic in 20s.   Currently on baseline O2 2L NC  Cardiovascular: normal rate, normal S1 and S2 and no carotid bruits  Abdomen: soft, round, non-tender, non-distended, normal bowel sounds, no masses or organomegaly  Extremities: no cyanosis, no clubbing. 2+ pitting edema to bilat LE's  Neurologic: no cranial nerve deficit and speech normal    Recent Labs     12/21/22  0703 12/22/22  0451 12/23/22  0524    143 146   K 4.5 4.3 4.2    103 102   CO2 31* 32* 37*   BUN 47* 45* 36*   CREATININE 1.7* 1.5* 1.4*   GLUCOSE 80 130* 78   CALCIUM 8.8 8.5* 8.7     Recent Labs     12/21/22  0703 12/22/22  0451 12/23/22  0524   WBC 6.1 6.0 6.3   RBC 3.33* 3.24* 3.38*   HGB 8.6* 8.5* 8.8*   HCT 31.3* 30.5* 31.5*   MCV 94.0 94.1 93.2   MCH 25.8* 26.2 26.0   MCHC 27.5* 27.9* 27.9*   RDW 17.8* 17.6* 17.4*    200 224   MPV 10.3 9.9 10.5       Assessment & Plan:    HFpEF with exacerbation   7/2022 ECHO with EF of 70-75%, indeterminate diastolic dysfunction  Initial BNP >15k. Still with pitting edema in bilat LE's through knees, but not as tight as previous exams. Cardiology following, diuretic changed to bumex 2mg daily. 1800ml FR     A-fib RVR   TVR- mild  initial rate 120's-160's  Cardiology following, initially reduced home Toprol XL from 100mg BID to 25mg daily. BB was held yesterday for borderline low BP resulting in patient going into AFib with RVR again. Received IV push of cardizem, digoxing IV pushes x2, digoxin IV q6h x3 doses. Spoke with cardiology re: hx of digoxin toxicity. No plans for additional digoxin, check serum dig level. Serum dig ordered now, and repeat for tomorrow afternoon. If dig WNL tomorrow afternoon-no need for further testing, and cardiology states pt can be discharged on current meds as long as pulse <110. Continue home Eliquis. Home asa stopped by cardiology. Continuous telemetry. Cardiology recommends f/u with Dr Monica Michel 2-3 weeks after discharge    CAD  HLD  5/2022 Lipid panel WNL. Continue home statin    Hypertension- with hypotension  Cardiology added midodrine 2.5mg TID.  BP improved today. Cardiology following, metoprolol reduced     Chronic anemia   Initial Hgb 9.0-at/near baseline  Hgb today pending     DMII   A1c 6.8  Continue home Lantus. AC, HS BS checks with low-dose SSI coverage. Hypoglycemia protocol. 4 carb diet     SHANTANU on CKD 3b   Initial Cr 2.0 (baseline 1.4-1.7)  Cr has improved daily, transitioned from IV to po diuretics yesterday. Cr 1.4. today, continue to monitor. Elevated troponins with negative delta   89, 92  Pt denies chest pain. Likely 2/2 vol overload. Cardiology following. Chronic hypoxic and hypercapneic respiratory failure with COPD  SERENA on Cpap, likely obesity hypoventilation syndrome as well  Moderate Pulmonary Hypertension  Patient on home 3L O2 via NC, currently on 2L saturating well  Monitor O2  BMI 50.78, lifestyle modification counseling, weight loss     HLD 5/2022 Lipid panel WNL  Anxiety  Home bipap  Continue home statin  Resume ativan 0.5mg nightly. OARRS reviewed. Code Status: Full Code  DVT prophylaxis: Eliquis    Disposition: Anticipate return to SNF 1-2 days, pending heart rate control & med changes. 30 minutes or more spent reviewing patient chart, assessing patient, discussing plan of care with patient and family, discussing plan of care with collaborating physician, and documentation. NOTE: This report was transcribed using voice recognition software. Every effort was made to ensure accuracy; however, inadvertent computerized transcription errors may be present.   Electronically signed by RASHARD Bach NP on 12/23/2022 at 1:35 PM

## 2022-12-23 NOTE — PLAN OF CARE
Problem: Discharge Planning  Goal: Discharge to home or other facility with appropriate resources  Outcome: Progressing  Flowsheets (Taken 12/23/2022 6770)  Discharge to home or other facility with appropriate resources: Identify barriers to discharge with patient and caregiver     Problem: Skin/Tissue Integrity  Goal: Absence of new skin breakdown  Description: 1. Monitor for areas of redness and/or skin breakdown  2. Assess vascular access sites hourly  3. Every 4-6 hours minimum:  Change oxygen saturation probe site  4. Every 4-6 hours:  If on nasal continuous positive airway pressure, respiratory therapy assess nares and determine need for appliance change or resting period.   Outcome: Progressing     Problem: ABCDS Injury Assessment  Goal: Absence of physical injury  Outcome: Progressing     Problem: Safety - Adult  Goal: Free from fall injury  Outcome: Progressing

## 2022-12-24 VITALS
DIASTOLIC BLOOD PRESSURE: 70 MMHG | RESPIRATION RATE: 20 BRPM | HEIGHT: 60 IN | TEMPERATURE: 98.8 F | OXYGEN SATURATION: 98 % | WEIGHT: 261 LBS | HEART RATE: 95 BPM | BODY MASS INDEX: 51.24 KG/M2 | SYSTOLIC BLOOD PRESSURE: 130 MMHG

## 2022-12-24 LAB
EKG ATRIAL RATE: 105 BPM
EKG Q-T INTERVAL: 312 MS
EKG QRS DURATION: 74 MS
EKG QTC CALCULATION (BAZETT): 455 MS
EKG R AXIS: 93 DEGREES
EKG T AXIS: 42 DEGREES
EKG VENTRICULAR RATE: 128 BPM
METER GLUCOSE: 105 MG/DL (ref 74–99)
METER GLUCOSE: 144 MG/DL (ref 74–99)
METER GLUCOSE: 152 MG/DL (ref 74–99)
METER GLUCOSE: 188 MG/DL (ref 74–99)
SARS-COV-2, NAAT: NOT DETECTED

## 2022-12-24 PROCEDURE — 87635 SARS-COV-2 COVID-19 AMP PRB: CPT

## 2022-12-24 PROCEDURE — 6360000002 HC RX W HCPCS: Performed by: NURSE PRACTITIONER

## 2022-12-24 PROCEDURE — 6370000000 HC RX 637 (ALT 250 FOR IP): Performed by: INTERNAL MEDICINE

## 2022-12-24 PROCEDURE — 6370000000 HC RX 637 (ALT 250 FOR IP): Performed by: STUDENT IN AN ORGANIZED HEALTH CARE EDUCATION/TRAINING PROGRAM

## 2022-12-24 PROCEDURE — 94660 CPAP INITIATION&MGMT: CPT

## 2022-12-24 PROCEDURE — 6370000000 HC RX 637 (ALT 250 FOR IP): Performed by: NURSE PRACTITIONER

## 2022-12-24 PROCEDURE — 1200000000 HC SEMI PRIVATE

## 2022-12-24 PROCEDURE — 82962 GLUCOSE BLOOD TEST: CPT

## 2022-12-24 PROCEDURE — 2700000000 HC OXYGEN THERAPY PER DAY

## 2022-12-24 PROCEDURE — 94640 AIRWAY INHALATION TREATMENT: CPT

## 2022-12-24 RX ORDER — MIDODRINE HYDROCHLORIDE 5 MG/1
5 TABLET ORAL
Qty: 90 TABLET | Refills: 0
Start: 2022-12-25

## 2022-12-24 RX ORDER — BUMETANIDE 2 MG/1
2 TABLET ORAL DAILY
Qty: 30 TABLET | Refills: 0
Start: 2022-12-25

## 2022-12-24 RX ORDER — METOPROLOL SUCCINATE 25 MG/1
25 TABLET, EXTENDED RELEASE ORAL DAILY
Qty: 30 TABLET | Refills: 0
Start: 2022-12-25

## 2022-12-24 RX ADMIN — APIXABAN 5 MG: 5 TABLET, FILM COATED ORAL at 20:32

## 2022-12-24 RX ADMIN — CARBIDOPA AND LEVODOPA 2 TABLET: 25; 100 TABLET, EXTENDED RELEASE ORAL at 20:31

## 2022-12-24 RX ADMIN — CARBIDOPA AND LEVODOPA 2 TABLET: 25; 100 TABLET, EXTENDED RELEASE ORAL at 08:34

## 2022-12-24 RX ADMIN — SUCRALFATE 1 G: 1 TABLET ORAL at 17:01

## 2022-12-24 RX ADMIN — BUDESONIDE 500 MCG: 0.5 SUSPENSION RESPIRATORY (INHALATION) at 16:57

## 2022-12-24 RX ADMIN — SUCRALFATE 1 G: 1 TABLET ORAL at 10:00

## 2022-12-24 RX ADMIN — METOPROLOL SUCCINATE 25 MG: 25 TABLET, EXTENDED RELEASE ORAL at 10:01

## 2022-12-24 RX ADMIN — ATORVASTATIN CALCIUM 20 MG: 10 TABLET, FILM COATED ORAL at 20:31

## 2022-12-24 RX ADMIN — LORAZEPAM 0.5 MG: 0.5 TABLET ORAL at 00:18

## 2022-12-24 RX ADMIN — BUDESONIDE 500 MCG: 0.5 SUSPENSION RESPIRATORY (INHALATION) at 06:02

## 2022-12-24 RX ADMIN — APIXABAN 5 MG: 5 TABLET, FILM COATED ORAL at 10:00

## 2022-12-24 RX ADMIN — PANTOPRAZOLE SODIUM 40 MG: 40 TABLET, DELAYED RELEASE ORAL at 10:01

## 2022-12-24 RX ADMIN — ROPINIROLE HYDROCHLORIDE 1 MG: 1 TABLET, FILM COATED ORAL at 10:00

## 2022-12-24 RX ADMIN — ACETAMINOPHEN 650 MG: 325 TABLET ORAL at 03:34

## 2022-12-24 RX ADMIN — MIDODRINE HYDROCHLORIDE 5 MG: 5 TABLET ORAL at 08:35

## 2022-12-24 RX ADMIN — BUMETANIDE 2 MG: 1 TABLET ORAL at 10:00

## 2022-12-24 RX ADMIN — ROPINIROLE HYDROCHLORIDE 1 MG: 1 TABLET, FILM COATED ORAL at 20:31

## 2022-12-24 RX ADMIN — SUCRALFATE 1 G: 1 TABLET ORAL at 20:31

## 2022-12-24 RX ADMIN — ARFORMOTEROL TARTRATE 15 MCG: 15 SOLUTION RESPIRATORY (INHALATION) at 16:57

## 2022-12-24 RX ADMIN — MIRTAZAPINE 7.5 MG: 15 TABLET, FILM COATED ORAL at 20:31

## 2022-12-24 RX ADMIN — MIDODRINE HYDROCHLORIDE 5 MG: 5 TABLET ORAL at 17:01

## 2022-12-24 RX ADMIN — INSULIN GLARGINE 13 UNITS: 100 INJECTION, SOLUTION SUBCUTANEOUS at 10:01

## 2022-12-24 RX ADMIN — INSULIN GLARGINE 13 UNITS: 100 INJECTION, SOLUTION SUBCUTANEOUS at 20:34

## 2022-12-24 RX ADMIN — CARBIDOPA AND LEVODOPA 2 TABLET: 25; 100 TABLET, EXTENDED RELEASE ORAL at 13:48

## 2022-12-24 RX ADMIN — SUCRALFATE 1 G: 1 TABLET ORAL at 13:48

## 2022-12-24 RX ADMIN — MIDODRINE HYDROCHLORIDE 5 MG: 5 TABLET ORAL at 12:30

## 2022-12-24 RX ADMIN — ROPINIROLE HYDROCHLORIDE 1 MG: 1 TABLET, FILM COATED ORAL at 13:48

## 2022-12-24 RX ADMIN — ARFORMOTEROL TARTRATE 15 MCG: 15 SOLUTION RESPIRATORY (INHALATION) at 06:02

## 2022-12-24 NOTE — DISCHARGE SUMMARY
[facility] Physician Discharge Summary       Aline Daily DO  [address] [address] [place]  [phone]    Follow up  Heart Failure hospitalization follow up 5-7 days of discharge    Abbi Freeman MD  [address] [facility]  [phone]    Follow up  Heart Failure hospitalization follow up call the cardiology office to make an hopstial follow up apointment within 5-7 days of discharge. [facility]  [address] [facility] [address]  [phone]  Go on 1/3/2023  Appointment scheduled on 01/03/2023 at 9am, Heart failure follow up and education, Bring all pill bottles to appointment    RASHARD Johnson CNP  [address] [facility]  [phone]    Go on 1/13/2023  Appointment scheduled on 01/13/2023 at [address] [place] [address] follow up, Provider visit, with Nickie VINCENT, in the CHF clinic (Entrance A), Bring all pill bottles to appointment      Activity level: as tolerated    Diet: ADULT DIET; Easy to Chew; 4 carb choices (60 gm/meal); Low Sodium (2 gm); Mildly Thick ([place]); 1800 ml    Labs:n/a    Condition at discharge: fair    Dispo:long term nursing facility    Continue supplemental oxygen via nasal canula @ 2 LPM round-the-clock. Continue CPAP / BiPAP during sleep as prior to admission. Patient ID:  Molina Tompkins  [id number]  38 y.o.  1950    Admit date: 12/19/2022    Discharge date and time:  12/24/2022  5:12 PM    Admission Diagnoses: Principal Problem:    Heart failure ([address]  Active Problems:    Acute renal insufficiency    Hypotension    Anemia    Atrial fibrillation with rapid ventricular response ([address]  Resolved Problems:    * No resolved hospital problems.  *      Discharge Diagnoses: Principal Problem:    Heart failure ([address]  Active Problems:    Acute renal insufficiency    Hypotension    Anemia    Atrial fibrillation with rapid ventricular response (HCC)  Resolved Problems:    * No resolved hospital problems. *      Consults:  IP CONSULT TO CARDIOLOGY  IP CONSULT TO FAMILY MEDICINE  IP CONSULT TO SOCIAL WORK  IP CONSULT TO HEART FAILURE NURSE/COORDINATOR  IP CONSULT TO DIETITIAN    Procedures: none    Hospital Course:   Aleta Olivia is a 67 y.o. female with a significant past medical history, including A-fib, CAD, CHF, CKD, DM, COPD who presents with Leg Swelling (Pt brought to ER by CHF clinic for lower leg edema.)  Pt states she was at the CHF clinic this am, and was sent in for fast heart rate & too much fluid in her body. States she wears O2 3L NC at baseline. Denies CP. SOB at rest as well as orthopnea. Bilat LE's tender. States she did not feel her heart racing. Has been in SNF's for at least the last 6 months. Wheelchair at bedside, pt states she really doesn't ambulate. Pt A-fib RVR upon arrival. Pertinent ER findings include hyperkalemia (5.1), SHANTANU (Cr 2.0), volume overload (BNP 15,566), elevated troponin (89, repeat pending), acute on chronic anemia (Hgb 9.0). While in ER, pt received 250ml IVF bolus, 324mg asa, lasix IV, & metoprolol IV. Patient diuresed for CHF overload, started on midodrine for hypotension and beta blocker titrated down. Went into AFib with RVR, given cardizem, then digoxin with improvement in heart rate. Discharged back to nursing facility in stable condition with stable vital signs.        Discharge Exam:  Vitals:    12/24/22 9344 12/24/22 0815 12/24/22 0856 12/24/22 1435   BP: (!) 100/44 105/62 121/61 (!) 109/59   Pulse: 89 82 82 76   Resp: 18 16  20   Temp: 97.4 °F (36.3 °C) 98.6 °F (37 °C)  98.6 °F (37 °C)   TempSrc: Oral Oral  Oral   SpO2: 99% 100%  98%   Weight:       Height:         General Appearance: appears elderly but more energetic today, alert and oriented to person, place and time and in no acute distress, on NC, better bed mobility today without dyspnea  Skin: warm and dry  Head: normocephalic and atraumatic  Eyes: PERRL, EOMI, conjunctivae normal  Neck: neck supple and non tender without mass   Pulmonary/Chest: clear to auscultation bilaterally- no wheezes, rales or rhonchi, normal air movement, no respiratory distress. Cardiovascular: normal rate, normal S1 and S2 and no carotid bruits  Abdomen: soft, round, non-tender, non-distended, obese, normal bowel sounds, no masses or organomegaly  Extremities: no cyanosis, no clubbing. 2+ pitting edema to bilat LE's  Neurologic: no cranial nerve deficit and speech normal    I/O last 3 completed shifts: In: 600 [P.O.:600]  Out: 1700 [Urine:1700]  I/O this shift:  In: 120 [P.O.:120]  Out: -       LABS:  Recent Labs     12/22/22  0451 12/23/22  0524    146   K 4.3 4.2    102   CO2 32* 37*   BUN 45* 36*   CREATININE 1.5* 1.4*   GLUCOSE 130* 78   CALCIUM 8.5* 8.7       Recent Labs     12/22/22 0451 12/23/22  0524   WBC 6.0 6.3   RBC 3.24* 3.38*   HGB 8.5* 8.8*   HCT 30.5* 31.5*   MCV 94.1 93.2   MCH 26.2 26.0   MCHC 27.9* 27.9*   RDW 17.6* 17.4*    224   MPV 9.9 10.5       No results for input(s): POCGLU in the last 72 hours. Imaging:  XR CHEST PORTABLE    Result Date: 12/19/2022  EXAMINATION: ONE XRAY VIEW OF THE CHEST 12/19/2022 9:20 am COMPARISON: 6 November 2022 HISTORY: ORDERING SYSTEM PROVIDED HISTORY: likely CHF TECHNOLOGIST PROVIDED HISTORY: Reason for exam:->likely CHF FINDINGS: Right pleural effusion appears somewhat diminished and there is diminished adjacent atelectasis and or infiltrate. Stable cardiomediastinal silhouette. No new abnormal findings. Diminished right pleural effusion with diminished adjacent atelectasis and or infiltrate.          Patient Instructions:   Current Discharge Medication List        START taking these medications    Details   apixaban (ELIQUIS) 5 MG TABS tablet Take 1 tablet by mouth 2 times daily  Qty: 60 tablet, Refills: 0      midodrine (PROAMATINE) 5 MG tablet Take 1 tablet by mouth 3 times daily (with meals)  Qty: 90 tablet, Refills: 0 CONTINUE these medications which have CHANGED    Details   metoprolol succinate (TOPROL XL) 25 MG extended release tablet Take 1 tablet by mouth daily  Qty: 30 tablet, Refills: 0      bumetanide (BUMEX) 2 MG tablet Take 1 tablet by mouth daily  Qty: 30 tablet, Refills: 0           CONTINUE these medications which have NOT CHANGED    Details   LORazepam (ATIVAN) 0.5 MG tablet Take 0.5 mg by mouth nightly. insulin lispro (HUMALOG) 100 UNIT/ML injection vial Inject into the skin 3 times daily (before meals)      sertraline (ZOLOFT) 50 MG tablet Take 50 mg by mouth daily      loperamide (IMODIUM) 2 MG capsule Take 2 mg by mouth 4 times daily as needed for Diarrhea      OXYGEN Inhale 2 L into the lungs continuous      docusate sodium (COLACE, DULCOLAX) 100 MG CAPS Take 100 mg by mouth 2 times daily as needed for Constipation      insulin glargine (LANTUS) 100 UNIT/ML injection vial Inject 13 Units into the skin 2 times daily  Qty: 10 mL, Refills: 0      atorvastatin (LIPITOR) 20 MG tablet Take 20 mg by mouth nightly      benzoin compound solution Apply 1 Applicatorful topically nightly Apply topically to right toe      ipratropium-albuterol (DUONEB) 0.5-2.5 (3) MG/3ML SOLN nebulizer solution Inhale 1 vial into the lungs 4 times daily      miconazole (MICOTIN) 2 % powder Apply 1 each topically 2 times daily Apply topically under breasts twice daily      pantoprazole (PROTONIX) 40 MG tablet Take 40 mg by mouth every morning      mirtazapine (REMERON) 15 MG tablet Take 7.5 mg by mouth nightly      budesonide-formoterol (SYMBICORT) 160-4.5 MCG/ACT AERO Inhale 2 puffs into the lungs 2 times daily      carbidopa-levodopa (SINEMET CR)  MG per extended release tablet Take 2 tablets by mouth in the morning and 2 tablets at noon and 2 tablets in the evening. rOPINIRole (REQUIP) 1 MG tablet Take 1 tablet by mouth in the morning and 1 tablet at noon and 1 tablet before bedtime.   Qty: 90 tablet, Refills: 3 sucralfate (CARAFATE) 1 GM tablet Take 1 tablet by mouth in the morning and 1 tablet at noon and 1 tablet in the evening and 1 tablet before bedtime. Qty: 120 tablet, Refills: 1      aspirin EC 81 MG EC tablet Take 1 tablet by mouth daily  Qty: 30 tablet, Refills: 0      montelukast (SINGULAIR) 10 MG tablet Take 10 mg by mouth nightly           STOP taking these medications       POTASSIUM CHLORIDE PO Comments:   Reason for Stopping:         furosemide (LASIX) 40 MG tablet Comments:   Reason for Stopping:         acetaminophen (TYLENOL) 325 MG tablet Comments:   Reason for Stopping:         metoprolol tartrate (LOPRESSOR) 25 MG tablet Comments:   Reason for Stopping:         amiodarone (CORDARONE) 200 MG tablet Comments:   Reason for Stopping:         budesonide (PULMICORT) 0.5 MG/2ML nebulizer suspension Comments:   Reason for Stopping:         insulin glargine-yfgn (SEMGLEE-YFGN) 100 UNIT/ML injection vial Comments:   Reason for Stopping:         QUEtiapine (SEROQUEL) 25 MG tablet Comments:   Reason for Stopping:         divalproex (DEPAKOTE) 250 MG DR tablet Comments:   Reason for Stopping:         ferrous sulfate (IRON 325) 325 (65 Fe) MG tablet Comments:   Reason for Stopping:                 Note that more than 30 minutes was spent in preparing discharge papers, discussing discharge with patient, medication review, etc.    NOTE: This report was transcribed using voice recognition software. Every effort was made to ensure accuracy; however, inadvertent computerized transcription errors may be present.      Signed:  Electronically signed by Marya Valentin DO on 12/24/2022 at 5:12 PM

## 2022-12-24 NOTE — CONSULTS
Nutrition Education    Educated on CHF - Low sodium  Learners: Patient  Readiness: Acceptance  Method: Explanation and Handout  Response: Verbalizes Understanding  Contact name and number provided.     Haroldo Del Real MS, RD, LD  Contact Number: [phone]

## 2022-12-24 NOTE — CARE COORDINATION
SOCIAL WORK / DISCHARGE PLANNING:   Pt to have Digoxin level drawn at 5pm if ok then will discharge and return to [facility], Fax [phone], NO PRECERT required. WILL NEED RAPID COVID TEST RESULTED PRIOR TO DC. Transport arranged via [employer] [phone], 8pm. If dc is held please cancel transport. [place] will need signed by physician. Ambulance paper completed and tubed to unit.              Electronically signed by ESTHER Flores on 12/24/2022 at 11:39 AM

## 2022-12-24 NOTE — PROGRESS NOTES
[address] [place], [place] [address]ist   Progress Note    Admitting Date and Time: 12/19/2022  8:48 AM  Admit Dx: Heart failure (HCC) [I50.9]  Acute renal insufficiency [N28.9]  Acute on chronic combined systolic and diastolic congestive heart failure (HCC) [I50.43]  Anemia, unspecified type [D64.9]    Subjective:    Patient reports improvement in her breathing. She is currently on 3 liters of oxygen. She is anxious for discharge because she said that her daughter is supposed to pick her up from the [address] [place] [address] tomorrow for [place]. Dicussed with her that he Digoxin level needs checked later today and that will determine discharge.     ROS: denies fever, chills, cp, sob, n/v, HA unless stated above.     midodrine  5 mg Oral TID WC    bumetanide  2 mg Oral Daily    metoprolol succinate  25 mg Oral Daily    apixaban  5 mg Oral BID    metoprolol  5 mg IntraVENous Once    atorvastatin  20 mg Oral Nightly    carbidopa-levodopa  2 tablet Oral TID    sucralfate  1 g Oral 4x Daily    rOPINIRole  1 mg Oral TID    miconazole  1 each Topical BID    Arformoterol Tartrate  15 mcg Nebulization BID    budesonide  0.5 mg Nebulization BID    sodium chloride flush  5-40 mL IntraVENous 2 times per day    insulin lispro  0-4 Units SubCUTAneous TID WC    insulin lispro  0-4 Units SubCUTAneous Nightly    mirtazapine  7.5 mg Oral Nightly    pantoprazole  40 mg Oral QAM    insulin glargine  13 Units SubCUTAneous BID     LORazepam, 0.5 mg, Nightly PRN  sodium chloride flush, 5-40 mL, PRN  sodium chloride, , PRN  ondansetron, 4 mg, Q8H PRN   Or  ondansetron, 4 mg, Q6H PRN  polyethylene glycol, 17 g, Daily PRN  acetaminophen, 650 mg, Q6H PRN   Or  acetaminophen, 650 mg, Q6H PRN  glucose, 4 tablet, PRN  dextrose bolus, 125 mL, PRN   Or  dextrose bolus, 250 mL, PRN  glucagon (rDNA), 1 mg, PRN  dextrose, , Continuous PRN  levalbuterol, 0.63 mg, Q4H PRN         Objective:    BP (!) 100/44   Pulse 89   Temp 97.4 °F (36.3 °C) (Oral) Resp 18   Ht 5' (1.524 m)   Wt 261 lb (118.4 kg)   SpO2 99%   BMI 50.97 kg/m²   General Appearance: alert and oriented to person, place and time and in no acute distress  Skin: warm and dry  Head: normocephalic and atraumatic  Eyes: pupils equal, round, and reactive to light, conjunctivae normal  Neck: neck supple and non tender without mass   Pulmonary/Chest: diminished bilaterally- no wheezes, rales or rhonchi, normal air movement, no respiratory distress  Cardiovascular:irregularly irregular   Abdomen: soft, non-tender, non-distended, normal bowel sounds  Extremities: no cyanosis, no clubbing and 2+ pitting lower extremity edema  Neurologic: no cranial nerve deficit and speech normal      Recent Labs     12/22/22  0451 12/23/22  0524    146   K 4.3 4.2    102   CO2 32* 37*   BUN 45* 36*   CREATININE 1.5* 1.4*   GLUCOSE 130* 78   CALCIUM 8.5* 8.7       Recent Labs     12/22/22  0451   ALKPHOS 88   PROT 5.0*   LABALBU 2.9*   BILITOT 0.3   AST 8   ALT <5       Recent Labs     12/22/22  0451 12/23/22  0524   WBC 6.0 6.3   RBC 3.24* 3.38*   HGB 8.5* 8.8*   HCT 30.5* 31.5*   MCV 94.1 93.2   MCH 26.2 26.0   MCHC 27.9* 27.9*   RDW 17.6* 17.4*    224   MPV 9.9 10.5         Radiology:   XR CHEST PORTABLE   Final Result   Diminished right pleural effusion with diminished adjacent atelectasis and or   infiltrate. Assessment:  Principal Problem:    Heart failure (HCC)  Active Problems:    Acute renal insufficiency    Hypotension    Anemia    Atrial fibrillation with rapid ventricular response (HCC)  Resolved Problems:    * No resolved hospital problems.  *      Plan:  Acute on chronic heart failure with preserved ejection fraction  - Echo on 7/7/22 with an EF of 70-75% and indeterminate diastolic function.   - Continue oral Bumex 2 mg daily  - Daily weights, strict I&Os  Atrial fibrillation RVR   - Continue Eliquis  - Continue Toprol XL (Cardiology reduced the dose from 100 mg twice a day to 25 mg daily on 12/20. Dose was held for three doses due to hypotension but she has received it since 12/23). - She did receive Digoxin and per family she has a history of Digoxin toxicity. Digoxin level yesterday was 2.8 and will be repeated later today. Diabetes   - Continue Lantus   - Continue Humalog sliding scale. - Hgb A1C is 6.8  Elevated troponin   - Cardiology following  - Likely secondary to fluid overload., SHANTANU   Hypotension   - Continue Midodrine  Acute kidney injury on chronic kidney disease   - Creatinine was 2.0 on admission and is now 1.4 (Baseline 1.4-1.7)  Chronic hypoxic respiratory failure with COPD   - Continue supplemental oxygen   - Continue LABA/ICS  Parkinson's disease   - Continue Carbidopa/Levodopa  SERENA   - Cpap  CAD  - Continue Lipitor  Anxiety   - Prn Ativan at HS    NOTE: This report was transcribed using voice recognition software. Every effort was made to ensure accuracy; however, inadvertent computerized transcription errors may be present.      Electronically signed by RASHARD Thomas CNP on 12/24/2022 at 8:19 AM

## 2022-12-24 NOTE — PROGRESS NOTES
[facility]) Physicians        CARDIOLOGY                 INPATIENT PROGRESS NOTE          PATIENT SEEN IN FOLLOW UP FOR: CHF    Hospital Day: 6     Denver Deist is a 67year old patient known to Dr. Caceres Aw: Denies any chest pain; No dizzy or heart racing; breathing better, No orthopnea    ROS: Review of rest of 10 systems negative except as mentioned above    OBJECTIVE: No acute distress. See Assessment     Diagnostics:       Telemetry:  Reviewed        Intake/Output Summary (Last 24 hours) at 12/24/2022 1149  Last data filed at 12/24/2022 0905  Gross per 24 hour   Intake 240 ml   Output 1200 ml   Net -960 ml       Labs:   CBC:   Recent Labs     12/22/22 0451 12/23/22 0524   WBC 6.0 6.3   HGB 8.5* 8.8*   HCT 30.5* 31.5*    224     BMP:   Recent Labs     12/22/22 0451 12/23/22 0524    146   K 4.3 4.2   CO2 32* 37*   BUN 45* 36*   CREATININE 1.5* 1.4*   LABGLOM 37 40   CALCIUM 8.5* 8.7     Mag: No results for input(s): MG in the last 72 hours. Phos: No results for input(s): PHOS in the last 72 hours. TSH: No results for input(s): TSH in the last 72 hours. HgA1c:     BNP: No results for input(s): BNP in the last 72 hours. PT/INR: No results for input(s): PROTIME, INR in the last 72 hours. APTT:No results for input(s): APTT in the last 72 hours. CARDIAC ENZYMES:  No results for input(s): CKTOTAL, TROPHS in the last 72 hours.     FASTING LIPID PANEL:  Lab Results   Component Value Date/Time    CHOL 95 05/12/2022 01:44 AM    HDL 38 05/12/2022 01:44 AM    LDLCALC 38 05/12/2022 01:44 AM    TRIG 93 05/12/2022 01:44 AM     LIVER PROFILE:  Recent Labs     12/22/22 0451   AST 8   ALT <5   LABALBU 2.9*       Current Inpatient Medications:   midodrine  5 mg Oral TID WC    bumetanide  2 mg Oral Daily    metoprolol succinate  25 mg Oral Daily    apixaban  5 mg Oral BID    metoprolol  5 mg IntraVENous Once    atorvastatin  20 mg Oral Nightly    carbidopa-levodopa  2 tablet Oral TID    sucralfate  1 g Oral 4x Daily    rOPINIRole  1 mg Oral TID    miconazole  1 each Topical BID    Arformoterol Tartrate  15 mcg Nebulization BID    budesonide  0.5 mg Nebulization BID    sodium chloride flush  5-40 mL IntraVENous 2 times per day    insulin lispro  0-4 Units SubCUTAneous TID WC    insulin lispro  0-4 Units SubCUTAneous Nightly    mirtazapine  7.5 mg Oral Nightly    pantoprazole  40 mg Oral QAM    insulin glargine  13 Units SubCUTAneous BID       IV Infusions (if any):   sodium chloride      dextrose           PHYSICAL EXAM:     CONSTITUTIONAL:   /62  Pulse 78   Temp 98 °F (36.7 °C) (Oral)   Resp 18   Ht 5' (1.524 m)   Wt 256 lb (116.1 kg)   SpO2 100%   BMI 50.00 kg/m²   Pulse  Av.6  Min: 71  Max: 95  Systolic (59SQC), JII:115 , Min:100 , YPS:265    Diastolic (15WUT), HZI:00, Min:44, Max:64    CONST:  Well developed, appears stated age. Awake, alert and no apparent distress. HEENT:   Head- Normocephalic  Eyes- Conjunctivae pink, no icterus  Throat- Oral mucosa moist  Neck-  No stridor, ? jugular venous distention. No carotid bruit. CHEST: Chest symmetrical and non-tender to palpation. No accessory muscle use  RESPIRATORY: Lung sounds - Few rhonchi  CARDIOVASCULAR:     Heart Ausculation- Irregularly irregular rate and rhythm, 2/6 systolic murmur. No s3   EXT: + lower extremity edema. Distal pulses palpable, no cyanosis   ABDOMEN: Soft, Bowel sounds present. MS: n/a  : Deferred  RECTAL: Deferred  SKIN: Warm and dry   NEURO / PSYCH: Oriented to person, place        IMPRESSION / RECOMMENDATIONS:     Acute on Chronic HFpEF- ([address] - EF 70-75% - Continue PO diuretics;  Monitor Wts, I/Os, BP, renal Fn - Add SGLT-2i later if renal Fn OK     CKD 3b - Monitor renal Fn     Elevated Troponin  - Flat pattern likely from SHANTANU, CHF - No CP, EKG reviewed     Paroxysmal Atrial fibrillation with rapid ventricular response (HCC) - rates controlled; OAC-Eliquis, Monitor H/H Hypotension - Metoprolol dose reduced; Midodrine added, increase to 5mg TID     Chronic anemia - Monitor H/H     CAD: [facility] Jan 2022 ( 50% mid LAD, 60% Ostial Dx; 50% AV branch of left circumflex artery) - Continue low dose BB as her BP tolerates, and Statin. ASA discontinued due to Anemia and on Eliquis     Tricuspid valve regurgitation - Mild     Pulmonary hypertension, Moderate     Hyperlipidemia - On Statin     Type 2 diabetes mellitus - Per Dr Gareth Larson                 No family at bed side    OK to discharge    Above recommendations discussed with her.     F/u with LALITO Contreras 2-3 weeks after discharge      Electronically signed by Luther Goetz MD on 12/24/2022   [facility]) Cardiology

## 2022-12-24 NOTE — PLAN OF CARE
Problem: Discharge Planning  Goal: Discharge to home or other facility with appropriate resources  Outcome: Progressing  Flowsheets (Taken 12/24/2022 0046)  Discharge to home or other facility with appropriate resources: Identify barriers to discharge with patient and caregiver     Problem: Skin/Tissue Integrity  Goal: Absence of new skin breakdown  Description: 1. Monitor for areas of redness and/or skin breakdown  2. Assess vascular access sites hourly  3. Every 4-6 hours minimum:  Change oxygen saturation probe site  4. Every 4-6 hours:  If on nasal continuous positive airway pressure, respiratory therapy assess nares and determine need for appliance change or resting period.   Outcome: Progressing     Problem: ABCDS Injury Assessment  Goal: Absence of physical injury  Outcome: Progressing  Flowsheets (Taken 12/24/2022 0127)  Absence of Physical Injury: Implement safety measures based on patient assessment     Problem: Safety - Adult  Goal: Free from fall injury  Outcome: Progressing  Flowsheets (Taken 12/24/2022 0127)  Free From Fall Injury: Instruct family/caregiver on patient safety     Problem: Chronic Conditions and Co-morbidities  Goal: Patient's chronic conditions and co-morbidity symptoms are monitored and maintained or improved  Outcome: Progressing  Flowsheets (Taken 12/24/2022 0046)  Care Plan - Patient's Chronic Conditions and Co-Morbidity Symptoms are Monitored and Maintained or Improved: Monitor and assess patient's chronic conditions and comorbid symptoms for stability, deterioration, or improvement

## 2022-12-25 PROCEDURE — 6370000000 HC RX 637 (ALT 250 FOR IP): Performed by: NURSE PRACTITIONER

## 2022-12-25 RX ADMIN — LORAZEPAM 0.5 MG: 0.5 TABLET ORAL at 00:12

## 2022-12-25 RX ADMIN — ACETAMINOPHEN 650 MG: 325 TABLET ORAL at 00:11

## 2022-12-30 ENCOUNTER — HOSPITAL ENCOUNTER (EMERGENCY)
Age: 72
Discharge: HOME | End: 2022-12-31
Attending: EMERGENCY MEDICINE
Payer: MEDICARE

## 2022-12-30 ENCOUNTER — TELEPHONE (OUTPATIENT)
Dept: OTHER | Facility: CLINIC | Age: 72
End: 2022-12-30

## 2022-12-30 ENCOUNTER — APPOINTMENT (OUTPATIENT)
Dept: GENERAL RADIOLOGY | Age: 72
End: 2022-12-30
Payer: MEDICARE

## 2022-12-30 VITALS
RESPIRATION RATE: 18 BRPM | DIASTOLIC BLOOD PRESSURE: 62 MMHG | HEART RATE: 78 BPM | BODY MASS INDEX: 48.49 KG/M2 | SYSTOLIC BLOOD PRESSURE: 119 MMHG | OXYGEN SATURATION: 97 % | WEIGHT: 247 LBS | HEIGHT: 60 IN | TEMPERATURE: 98.2 F

## 2022-12-30 DIAGNOSIS — J90 PLEURAL EFFUSION: Primary | ICD-10-CM

## 2022-12-30 DIAGNOSIS — R60.9 PERIPHERAL EDEMA: ICD-10-CM

## 2022-12-30 DIAGNOSIS — R60.9 FLUID RETENTION: ICD-10-CM

## 2022-12-30 LAB
ALBUMIN SERPL-MCNC: 3.6 G/DL (ref 3.5–5.2)
ALP BLD-CCNC: 94 U/L (ref 35–104)
ALT SERPL-CCNC: <5 U/L (ref 0–32)
ANION GAP SERPL CALCULATED.3IONS-SCNC: 5 MMOL/L (ref 7–16)
ANISOCYTOSIS: ABNORMAL
AST SERPL-CCNC: 11 U/L (ref 0–31)
ATYPICAL LYMPHOCYTE RELATIVE PERCENT: 0.9 % (ref 0–4)
BASOPHILIC STIPPLING: ABNORMAL
BASOPHILS ABSOLUTE: 0.06 E9/L (ref 0–0.2)
BASOPHILS RELATIVE PERCENT: 0.9 % (ref 0–2)
BILIRUB SERPL-MCNC: 0.4 MG/DL (ref 0–1.2)
BUN BLDV-MCNC: 31 MG/DL (ref 6–23)
CALCIUM SERPL-MCNC: 8.7 MG/DL (ref 8.6–10.2)
CHLORIDE BLD-SCNC: 96 MMOL/L (ref 98–107)
CO2: 44 MMOL/L (ref 22–29)
CREAT SERPL-MCNC: 1.5 MG/DL (ref 0.5–1)
EKG ATRIAL RATE: 82 BPM
EKG Q-T INTERVAL: 318 MS
EKG QRS DURATION: 74 MS
EKG QTC CALCULATION (BAZETT): 333 MS
EKG R AXIS: 83 DEGREES
EKG T AXIS: -62 DEGREES
EKG VENTRICULAR RATE: 66 BPM
EOSINOPHILS ABSOLUTE: 0.06 E9/L (ref 0.05–0.5)
EOSINOPHILS RELATIVE PERCENT: 0.9 % (ref 0–6)
GFR SERPL CREATININE-BSD FRML MDRD: 37 ML/MIN/1.73
GLUCOSE BLD-MCNC: 158 MG/DL (ref 74–99)
HCT VFR BLD CALC: 30.9 % (ref 34–48)
HEMOGLOBIN: 8.4 G/DL (ref 11.5–15.5)
INFLUENZA A BY PCR: NOT DETECTED
INFLUENZA B BY PCR: NOT DETECTED
LYMPHOCYTES ABSOLUTE: 0.68 E9/L (ref 1.5–4)
LYMPHOCYTES RELATIVE PERCENT: 8.7 % (ref 20–42)
MAGNESIUM: 2 MG/DL (ref 1.6–2.6)
MCH RBC QN AUTO: 26.1 PG (ref 26–35)
MCHC RBC AUTO-ENTMCNC: 27.2 % (ref 32–34.5)
MCV RBC AUTO: 96 FL (ref 80–99.9)
MONOCYTES ABSOLUTE: 0.27 E9/L (ref 0.1–0.95)
MONOCYTES RELATIVE PERCENT: 3.5 % (ref 2–12)
NEUTROPHILS ABSOLUTE: 5.78 E9/L (ref 1.8–7.3)
NEUTROPHILS RELATIVE PERCENT: 85.2 % (ref 43–80)
OVALOCYTES: ABNORMAL
PDW BLD-RTO: 17.2 FL (ref 11.5–15)
PLATELET # BLD: 219 E9/L (ref 130–450)
PMV BLD AUTO: 10 FL (ref 7–12)
POIKILOCYTES: ABNORMAL
POLYCHROMASIA: ABNORMAL
POTASSIUM SERPL-SCNC: 4.2 MMOL/L (ref 3.5–5)
PRO-BNP: ABNORMAL PG/ML (ref 0–125)
RBC # BLD: 3.22 E12/L (ref 3.5–5.5)
SARS-COV-2, NAAT: NOT DETECTED
SODIUM BLD-SCNC: 145 MMOL/L (ref 132–146)
TEAR DROP CELLS: ABNORMAL
TOTAL PROTEIN: 5.8 G/DL (ref 6.4–8.3)
TROPONIN, HIGH SENSITIVITY: 93 NG/L (ref 0–9)
WBC # BLD: 6.8 E9/L (ref 4.5–11.5)

## 2022-12-30 PROCEDURE — 96374 THER/PROPH/DIAG INJ IV PUSH: CPT

## 2022-12-30 PROCEDURE — 83735 ASSAY OF MAGNESIUM: CPT

## 2022-12-30 PROCEDURE — 83880 ASSAY OF NATRIURETIC PEPTIDE: CPT

## 2022-12-30 PROCEDURE — 80053 COMPREHEN METABOLIC PANEL: CPT

## 2022-12-30 PROCEDURE — 87502 INFLUENZA DNA AMP PROBE: CPT

## 2022-12-30 PROCEDURE — 87635 SARS-COV-2 COVID-19 AMP PRB: CPT

## 2022-12-30 PROCEDURE — 71045 X-RAY EXAM CHEST 1 VIEW: CPT

## 2022-12-30 PROCEDURE — 93010 ELECTROCARDIOGRAM REPORT: CPT | Performed by: INTERNAL MEDICINE

## 2022-12-30 PROCEDURE — 84484 ASSAY OF TROPONIN QUANT: CPT

## 2022-12-30 PROCEDURE — 36415 COLL VENOUS BLD VENIPUNCTURE: CPT

## 2022-12-30 PROCEDURE — 2500000003 HC RX 250 WO HCPCS: Performed by: STUDENT IN AN ORGANIZED HEALTH CARE EDUCATION/TRAINING PROGRAM

## 2022-12-30 PROCEDURE — 85025 COMPLETE CBC W/AUTO DIFF WBC: CPT

## 2022-12-30 PROCEDURE — 93005 ELECTROCARDIOGRAM TRACING: CPT | Performed by: STUDENT IN AN ORGANIZED HEALTH CARE EDUCATION/TRAINING PROGRAM

## 2022-12-30 PROCEDURE — 99285 EMERGENCY DEPT VISIT HI MDM: CPT

## 2022-12-30 RX ORDER — BUMETANIDE 0.25 MG/ML
1 INJECTION, SOLUTION INTRAMUSCULAR; INTRAVENOUS ONCE
Status: COMPLETED | OUTPATIENT
Start: 2022-12-30 | End: 2022-12-30

## 2022-12-30 RX ORDER — FUROSEMIDE 10 MG/ML
20 INJECTION INTRAMUSCULAR; INTRAVENOUS ONCE
Status: DISCONTINUED | OUTPATIENT
Start: 2022-12-30 | End: 2022-12-30

## 2022-12-30 RX ORDER — ACETAMINOPHEN 325 MG/1
650 TABLET ORAL EVERY 4 HOURS PRN
COMMUNITY

## 2022-12-30 RX ADMIN — BUMETANIDE 1 MG: 0.25 INJECTION, SOLUTION INTRAMUSCULAR; INTRAVENOUS at 18:16

## 2022-12-30 ASSESSMENT — ENCOUNTER SYMPTOMS
BACK PAIN: 0
SORE THROAT: 0
NAUSEA: 0
RHINORRHEA: 0
COUGH: 0
DIARRHEA: 0
SHORTNESS OF BREATH: 1
VOMITING: 0
WHEEZING: 0
ABDOMINAL PAIN: 0

## 2022-12-30 ASSESSMENT — PAIN - FUNCTIONAL ASSESSMENT: PAIN_FUNCTIONAL_ASSESSMENT: NONE - DENIES PAIN

## 2022-12-30 NOTE — PROGRESS NOTES
Pt's daughter Lukas Abbott called in to the CHF clinic asking to speak to a provider regarding her mother Corene Landau. Explained that there is no provider working in the clinic, was advised to call Dr. Riaz Rosa office. Daughter has a concern for symptoms of worsening HF and asked to get an earlier appointment. Pt currently scheduled CHF clinic appointment on 1/3/23. Explained that we would not be open on Monday due to the holiday. Pt resides at [address] and provides all transportation to CHF clinic appointments. Advised her to call [facility] to verify ability to transport client to same day appointments.

## 2022-12-30 NOTE — ED PROVIDER NOTES
[id number] [facility]  ED Provider Note  Department of Emergency Medicine     ED Room:       Written by: Fabian Dandy, DO  Patient Name: Mikie Shaikh  Attending Provider: Sebastian Vasquez DO  Admit Date: 2022  2:08 PM  MRN: [id number]    : 1950        Chief Complaint   Patient presents with    Leg Swelling     Bilateral, chronic, worse recently    Shortness of Breath     Episode of SOB this morning    - Chief complaint    HPI   Mikie Shaikh is a 67 y.o. female presenting to the ED for evaluation of Leg Swelling (Bilateral, chronic, worse recently) and Shortness of Breath (Episode of SOB this morning)      History obtained from patient and chart review. Patient has a past medical history of CKD, COPD and CHF on Bumex and supplemental oxygen 2-3 L baseline; CAD, A. fib on Eliquis. Patient is presenting for evaluation of with swelling and shortness of breath; patient states she has had bilateral leg swelling for over a month, she was recently admitted for this; she has not had any recent medication changes. She is supposed to use CPAP at night but has not had it at the nursing facility since discharge from the hospital on , until yesterday evening; she denies any shortness of breath at rest, no chest pain or palpitations. Denies lower extremity tenderness, she was actually sent in for evaluation after a physical therapist at the nursing facility noticed a fluid-filled blister on her left anterior shin physical to CHF clinic in the recommended she come in for evaluation due to fluid retention. Patient otherwise denies any other complaints. Patient's complaints are severe in severity, and persistent.   Nothing specific makes it better or worse    Denies any fevers, neck pain or stiffness, cough or sore throat, chest pain or palpitations, abdominal pain, nausea or vomiting, diarrhea, black or bloody stools, abnormal urinary symptoms, numbness or tingling anywhere. Review of Systems   Constitutional:  Negative for chills and fever. HENT:  Negative for congestion, rhinorrhea and sore throat. Eyes:  Negative for visual disturbance. Respiratory:  Positive for shortness of breath. Negative for cough and wheezing. Cardiovascular:  Positive for leg swelling. Negative for chest pain and palpitations. Gastrointestinal:  Negative for abdominal pain, diarrhea, nausea and vomiting. Endocrine: Negative for heat intolerance and polydipsia. Genitourinary:  Negative for dysuria and frequency. Musculoskeletal:  Negative for back pain and myalgias. Skin:  Negative for rash and wound. Neurological:  Negative for weakness and numbness. Psychiatric/Behavioral:  Negative for confusion. All other systems reviewed and are negative. Physical Exam  Vitals and nursing note reviewed. Constitutional:       General: She is not in acute distress. Appearance: She is obese. She is ill-appearing (chronically). HENT:      Head: Normocephalic and atraumatic. Right Ear: External ear normal.      Left Ear: External ear normal.      Nose: Nose normal. No rhinorrhea. Mouth/Throat:      Mouth: Mucous membranes are moist.      Pharynx: Oropharynx is clear. Eyes:      Extraocular Movements: Extraocular movements intact. Conjunctiva/sclera: Conjunctivae normal.      Pupils: Pupils are equal, round, and reactive to light. Cardiovascular:      Rate and Rhythm: Normal rate and regular rhythm. Pulses: Normal pulses. Heart sounds: Normal heart sounds. Pulmonary:      Effort: Pulmonary effort is normal. No tachypnea or respiratory distress. Breath sounds: Examination of the right-middle field reveals decreased breath sounds. Examination of the right-lower field reveals decreased breath sounds. Examination of the left-lower field reveals decreased breath sounds. Decreased breath sounds present. No wheezing or rales.    Abdominal: General: Bowel sounds are normal.      Palpations: Abdomen is soft. Tenderness: There is no abdominal tenderness. There is no guarding. Musculoskeletal:         General: No tenderness. Normal range of motion. Cervical back: Normal range of motion and neck supple. Right lower leg: Edema present. Left lower leg: Edema present. Comments: Non-tender tense fluid-filled blister left anterior shin     Skin:     General: Skin is warm and dry. Capillary Refill: Capillary refill takes less than 2 seconds. Coloration: Skin is not jaundiced or pale. Neurological:      General: No focal deficit present. Mental Status: She is alert and oriented to person, place, and time. Sensory: No sensory deficit. Motor: No weakness. Psychiatric:         Mood and Affect: Mood normal.         Behavior: Behavior normal.          Procedures       MDM     Amount and/or Complexity of Data Reviewed  Decide to obtain previous medical records or to obtain history from someone other than the patient: yes         ED Course as of 12/30/22 2134   Fri Dec 30, 2022   1454 ATTENDING PROVIDER ATTESTATION:     I have personally performed and/or participated in the history, exam, medical decision making, and procedures and agree with all pertinent clinical information unless otherwise noted. I have also reviewed and agree with the past medical, family and social history unless otherwise noted. I have discussed this patient in detail with the resident, and provided the instruction and education regarding patient here with a history of CHF and uses CPAP at night. Sent in for evaluation of bilateral leg edema with some blisters developing on the legs. She denies pain to the area and denies fevers, sweats or chills. She denies resting shortness of breath. Denies any chest pain. No fevers. No new cough. .  My findings/plan: Patient is an obese female resting comfortably laying in the bed in no distress with a +2-3 diffuse bilateral lower extremity edema all the way up both legs with some superficial noninfectious blisters noted. No associated erythema or purulence with them. She has no calf pain and no unilateral leg swelling. Heart rate regular, lungs are clear anteriorly. She is on her baseline oxygen with normal pulse ox. She is awake and alert and oriented. [NC]   1463 Today's x-ray result reviewed,  CT chest from November 2022 result reviewed, patient with documented large right pleural effusion and collapse of the right lower and middle lung at that time. Patient not currently short of breath and is not hypoxic. [NC]      ED Course User Index  [NC] Lieutenant Jean DO         Medical Decision Making: This is a 76-year-old female with past medical history of CHF presenting for evaluation of shortness of breath and bilateral lower extremity edema with newly formed fluid-filled blister on her left anterior shin which is nontender and tenths. On my evaluation patient is alert and oriented, nontoxic in appearance, she is chronically ill-appearing. She has normal work of breathing and SPO2 98% on baseline 2.5 L NC O2; states that she uses 2-3 L chronically. Also supposed to use CPAP at night which she has not had for several days but did resume yesterday evening. She is in no distress. Lungs diminished mildly on the right lower. Abdomen soft and nontender. There is bilateral edema of her lower extremities with some mild serous drainage at the left and a tense fluid-filled blister as pictured above, distal pulses 2+, normal sensation, no signs to suggest infection. CXR shows right-sided pleural effusion and possible collapse of right lower lung lobe; compared to previous imaging, this is present since a CT chest on 11/8/2022 which showed bilateral pleural effusions at the time, large on the right with collapse of the right middle and lower lobe. These findings are not acute. Patient was given a dose of 1 mg IV Bumex. She remains nontoxic in appearance. Her labs were reviewed and are near her baseline. Again, she has normal work of breathing, is not short of breath and is not hypoxic. She will be able to use her CPAP tonight at the nursing facility, as discussed with the patient, I feel she is stable and appropriate for discharge. She may follow-up outpatient with her PCP and the CHF clinic; results and plan were discussed, the patient voiced understanding and is amenable. Strict return precautions were discussed. See ED COURSE for additional MDM. EKG reviewed and interpreted by me: This EKG is signed by emergency department physician. EKG interpreted by the emergency department physician, 1725:    A. fib, low voltage QRS, normal axis, no ST elevation; no ischemic changes. Rate 66, QRS 74, QTc 333. Labs & imaging were reviewed and interpreted, see RESULTS. I have personally reviewed all laboratory and imaging results for this patient. I have discussed this patient with my attending, who has seen the patient and agrees with this disposition. Patient was seen and evaluated by myself and my attending Susannah Pedersen DO. Assessment and Plan discussed with attending provider, please see attestation for final plan of care.         --------------------------------------------- PAST HISTORY ---------------------------------------------  Past Medical History:  has a past medical history of A-fib ([address] Acute on chronic congestive heart failure ([address] Anxiety, Asthma, CAD (coronary artery disease), Cancer ([address] Chronic kidney disease, COPD exacerbation ([address] Depression, Diabetes mellitus ([address] H/O mammogram, Hx MRSA infection, Hyperlipidemia, Hypertension, Lateral epicondylitis, Morbid obesity ([address] SERENA on CPAP, Oxygen dependent, Parkinson's disease ([address] and Tubal ligation status.     Past Surgical History:  has a past surgical history that includes Gallbladder surgery; Toe amputation; Cholecystectomy; Colonoscopy (7/29/15); Endoscopy, colon, diagnostic (7/19/15); Upper gastrointestinal endoscopy; lumbar laminectomy; Tonsillectomy; Breast lumpectomy; Breast reduction surgery; Cardiac catheterization (4/28/2014); Cardiac catheterization (01/11/2022); CT GUIDED CHEST TUBE (7/8/2022); and Upper gastrointestinal endoscopy (N/A, 7/19/2022). Social History:  reports that she has never smoked. She has never used smokeless tobacco. She reports that she does not drink alcohol and does not use drugs. Family History: family history includes Cancer in her father and mother; Diabetes in her sister; Heart Disease in her sister; Other in her brother; Seizures in her son. Unless otherwise noted, family history is non contributory. The patients home medications have been reviewed.     Allergies: Ciprofloxacin, Codeine, and Digoxin and related    -------------------------------------------------- RESULTS -------------------------------------------------  Labs:  Results for orders placed or performed during the hospital encounter of 12/30/22   COVID-19, Rapid    Specimen: Nasopharyngeal Swab   Result Value Ref Range    SARS-CoV-2, NAAT Not Detected Not Detected   Rapid influenza A/B antigens    Specimen: Nasopharyngeal   Result Value Ref Range    Influenza A by PCR Not Detected Not Detected    Influenza B by PCR Not Detected Not Detected   CMP   Result Value Ref Range    Sodium 145 132 - 146 mmol/L    Potassium 4.2 3.5 - 5.0 mmol/L    Chloride 96 (L) 98 - 107 mmol/L    CO2 44 (HH) 22 - 29 mmol/L    Anion Gap 5 (L) 7 - 16 mmol/L    Glucose 158 (H) 74 - 99 mg/dL    BUN 31 (H) 6 - 23 mg/dL    Creatinine 1.5 (H) 0.5 - 1.0 mg/dL    Est, Glom Filt Rate 37 >=60 mL/min/1.73    Calcium 8.7 8.6 - 10.2 mg/dL    Total Protein 5.8 (L) 6.4 - 8.3 g/dL    Albumin 3.6 3.5 - 5.2 g/dL    Total Bilirubin 0.4 0.0 - 1.2 mg/dL    Alkaline Phosphatase 94 35 - 104 U/L    ALT <5 0 - 32 U/L    AST 11 0 - 31 U/L   CBC with Auto Differential   Result Value Ref Range    WBC 6.8 4.5 - 11.5 E9/L    RBC 3.22 (L) 3.50 - 5.50 E12/L    Hemoglobin 8.4 (L) 11.5 - 15.5 g/dL    Hematocrit 30.9 (L) 34.0 - 48.0 %    MCV 96.0 80.0 - 99.9 fL    MCH 26.1 26.0 - 35.0 pg    MCHC 27.2 (L) 32.0 - 34.5 %    RDW 17.2 (H) 11.5 - 15.0 fL    Platelets 996 067 - 486 E9/L    MPV 10.0 7.0 - 12.0 fL    Neutrophils % 85.2 (H) 43.0 - 80.0 %    Lymphocytes % 8.7 (L) 20.0 - 42.0 %    Monocytes % 3.5 2.0 - 12.0 %    Eosinophils % 0.9 0.0 - 6.0 %    Basophils % 0.9 0.0 - 2.0 %    Neutrophils Absolute 5.78 1.80 - 7.30 E9/L    Lymphocytes Absolute 0.68 (L) 1.50 - 4.00 E9/L    Monocytes Absolute 0.27 0.10 - 0.95 E9/L    Eosinophils Absolute 0.06 0.05 - 0.50 E9/L    Basophils Absolute 0.06 0.00 - 0.20 E9/L    Atypical Lymphocytes Relative 0.9 0.0 - 4.0 %    Anisocytosis 2+     Polychromasia 1+     Poikilocytes 1+     Ovalocytes 1+     Tear Drop Cells 1+     Basophilic Stippling 1+    Magnesium   Result Value Ref Range    Magnesium 2.0 1.6 - 2.6 mg/dL   Troponin   Result Value Ref Range    Troponin, High Sensitivity 93 (H) 0 - 9 ng/L   Brain Natriuretic Peptide   Result Value Ref Range    Pro-BNP 16,518 (H) 0 - 125 pg/mL   EKG 12 Lead   Result Value Ref Range    Ventricular Rate 66 BPM    Atrial Rate 82 BPM    QRS Duration 74 ms    Q-T Interval 318 ms    QTc Calculation (Bazett) 333 ms    R Axis 83 degrees    T Axis -62 degrees       Radiology:  XR CHEST 1 VIEW   Final Result   1. Right pleural effusion right lung base atelectasis. 2. I cannot exclude partial collapse of the right lower lobe   3. Cardiomegaly             Interpreted by the radiologist unless otherwise specified.      ------------------------- NURSING NOTES AND VITALS REVIEWED ---------------------------  Date / Time Roomed:  12/30/2022  2:08 PM  ED Bed Assignment:  [id number]    The nursing notes within the ED encounter and vital signs as below have been reviewed by myself.    BP (!) 121/59   Pulse 93   Temp 98.2 °F (36.8 °C) (Oral)   Resp 20   Ht 5' (1.524 m)   Wt 247 lb (112 kg)   SpO2 96%   BMI 48.24 kg/m²   Oxygen Saturation Interpretation: Normal    The patients available past medical records and past encounters were reviewed. ------------------------------------------ PROGRESS NOTES ------------------------------------------  10:04 PM EST  I have spoken with the patient and discussed todays results, in addition to providing specific details for the plan of care and counseling regarding the diagnosis and prognosis. Their questions are answered at this time and they are agreeable with the plan. I discussed at length with them reasons for immediate return here for re evaluation. They will followup with their primary care physician by calling their office on Monday.      --------------------------------- ADDITIONAL PROVIDER NOTES ---------------------------------  At this time the patient is without objective evidence of an acute process requiring hospitalization or inpatient management. They have remained hemodynamically stable throughout their entire ED visit and are stable for discharge with outpatient follow-up. The plan has been discussed in detail and they are aware of the specific conditions for emergent return, as well as the importance of follow-up. New Prescriptions    No medications on file       Diagnosis:  1. Pleural effusion    2. Fluid retention    3. Peripheral edema        Disposition:  Patient's disposition: Discharge to nursing home  Patient's condition is stable. Kaylee Correa D.O. PGY-3     Resident Physician     Emergency Medicine      12/30/2022 2:31 PM      NOTE: This report was transcribed using voice recognition software.  Every effort was made to ensure accuracy; however, inadvertent computerized transcription errors may be present             Kaylee Correa DO  Resident  12/30/22 [id number]

## 2022-12-30 NOTE — TELEPHONE ENCOUNTER
Writer contacted Dr. Jorge Ford to inform of 30 day readmission risk. Writer's attempt to contact Dr. Jorge Ford was unsuccessful.      Call Back: If you need to call back to inform of disposition you can contact me at [phone]

## 2022-12-31 NOTE — ED NOTES
[facility]ole care for incontinent urine. Resting on cart with 02 awaiting .      Emil Moscoso RN  12/31/22 1978

## 2023-01-01 ENCOUNTER — APPOINTMENT (OUTPATIENT)
Dept: GENERAL RADIOLOGY | Age: 73
End: 2023-01-01
Payer: MEDICARE

## 2023-01-01 ENCOUNTER — APPOINTMENT (OUTPATIENT)
Dept: CT IMAGING | Age: 73
End: 2023-01-01
Payer: MEDICARE

## 2023-01-01 ENCOUNTER — OUTSIDE SERVICES (OUTPATIENT)
Dept: PRIMARY CARE CLINIC | Age: 73
End: 2023-01-01

## 2023-01-01 ENCOUNTER — HOSPITAL ENCOUNTER (INPATIENT)
Age: 73
LOS: 3 days | End: 2023-04-20
Attending: EMERGENCY MEDICINE | Admitting: INTERNAL MEDICINE
Payer: MEDICARE

## 2023-01-01 VITALS
TEMPERATURE: 98.1 F | RESPIRATION RATE: 1 BRPM | HEIGHT: 60 IN | DIASTOLIC BLOOD PRESSURE: 38 MMHG | BODY MASS INDEX: 37.69 KG/M2 | HEART RATE: 154 BPM | OXYGEN SATURATION: 95 % | SYSTOLIC BLOOD PRESSURE: 76 MMHG | WEIGHT: 192 LBS

## 2023-01-01 DIAGNOSIS — E87.29 RESPIRATORY ACIDOSIS: ICD-10-CM

## 2023-01-01 DIAGNOSIS — U07.1 COVID: Primary | ICD-10-CM

## 2023-01-01 DIAGNOSIS — E11.22 CKD STAGE 3 SECONDARY TO DIABETES (HCC): ICD-10-CM

## 2023-01-01 DIAGNOSIS — Z79.4 TYPE 2 DIABETES MELLITUS WITH DIABETIC NEUROPATHY, WITH LONG-TERM CURRENT USE OF INSULIN (HCC): ICD-10-CM

## 2023-01-01 DIAGNOSIS — I10 PRIMARY HYPERTENSION: ICD-10-CM

## 2023-01-01 DIAGNOSIS — E11.40 TYPE 2 DIABETES MELLITUS WITH DIABETIC NEUROPATHY, WITH LONG-TERM CURRENT USE OF INSULIN (HCC): ICD-10-CM

## 2023-01-01 DIAGNOSIS — I50.31 ACUTE DIASTOLIC (CONGESTIVE) HEART FAILURE (HCC): ICD-10-CM

## 2023-01-01 DIAGNOSIS — A41.9 SEPSIS, DUE TO UNSPECIFIED ORGANISM, UNSPECIFIED WHETHER ACUTE ORGAN DYSFUNCTION PRESENT (HCC): Primary | ICD-10-CM

## 2023-01-01 DIAGNOSIS — J96.22 ACUTE ON CHRONIC RESPIRATORY FAILURE WITH HYPERCAPNIA (HCC): ICD-10-CM

## 2023-01-01 DIAGNOSIS — N18.30 CKD STAGE 3 SECONDARY TO DIABETES (HCC): ICD-10-CM

## 2023-01-01 DIAGNOSIS — I48.19 PERSISTENT ATRIAL FIBRILLATION (HCC): ICD-10-CM

## 2023-01-01 DIAGNOSIS — E78.2 MIXED HYPERLIPIDEMIA: ICD-10-CM

## 2023-01-01 DIAGNOSIS — R60.1 GENERALIZED EDEMA: ICD-10-CM

## 2023-01-01 DIAGNOSIS — N17.9 AKI (ACUTE KIDNEY INJURY) (HCC): ICD-10-CM

## 2023-01-01 DIAGNOSIS — G20 PARKINSON'S DISEASE (HCC): ICD-10-CM

## 2023-01-01 LAB
ACANTHOCYTES: ABNORMAL
ALBUMIN SERPL-MCNC: 3.7 G/DL (ref 3.5–5.2)
ALP SERPL-CCNC: 76 U/L (ref 35–104)
ALT SERPL-CCNC: <5 U/L (ref 0–32)
ANION GAP SERPL CALCULATED.3IONS-SCNC: 13 MMOL/L (ref 7–16)
ANION GAP SERPL CALCULATED.3IONS-SCNC: 14 MMOL/L (ref 7–16)
ANISOCYTOSIS: ABNORMAL
ANISOCYTOSIS: ABNORMAL
AST SERPL-CCNC: 6 U/L (ref 0–31)
B PARAP IS1001 DNA NPH QL NAA+NON-PROBE: NOT DETECTED
B PERT.PT PRMT NPH QL NAA+NON-PROBE: NOT DETECTED
B.E.: -0.4 MMOL/L (ref -3–3)
B.E.: -1.9 MMOL/L (ref -3–3)
BACTERIA URNS QL MICRO: ABNORMAL /HPF
BASOPHILS # BLD: 0 E9/L (ref 0–0.2)
BASOPHILS # BLD: 0.19 E9/L (ref 0–0.2)
BASOPHILS NFR BLD: 0.1 % (ref 0–2)
BASOPHILS NFR BLD: 1.8 % (ref 0–2)
BILIRUB SERPL-MCNC: 0.7 MG/DL (ref 0–1.2)
BILIRUB UR QL STRIP: ABNORMAL
BNP BLD-MCNC: ABNORMAL PG/ML (ref 0–125)
BUN SERPL-MCNC: 18 MG/DL (ref 6–23)
BUN SERPL-MCNC: 25 MG/DL (ref 6–23)
C PNEUM DNA NPH QL NAA+NON-PROBE: NOT DETECTED
CALCIUM SERPL-MCNC: 7.5 MG/DL (ref 8.6–10.2)
CALCIUM SERPL-MCNC: 9.1 MG/DL (ref 8.6–10.2)
CHLORIDE SERPL-SCNC: 104 MMOL/L (ref 98–107)
CHLORIDE SERPL-SCNC: 98 MMOL/L (ref 98–107)
CLARITY UR: ABNORMAL
CO2 SERPL-SCNC: 28 MMOL/L (ref 22–29)
CO2 SERPL-SCNC: 31 MMOL/L (ref 22–29)
COARSE GRAN CASTS #/AREA URNS LPF: ABNORMAL /LPF (ref 0–2)
COHB: 0.3 % (ref 0–1.5)
COHB: 0.6 % (ref 0–1.5)
COLOR UR: ABNORMAL
CREAT SERPL-MCNC: 1.6 MG/DL (ref 0.5–1)
CREAT SERPL-MCNC: 2.8 MG/DL (ref 0.5–1)
CRITICAL: ABNORMAL
CRITICAL: ABNORMAL
CRP SERPL HS-MCNC: 2.1 MG/DL (ref 0–0.4)
CRP SERPL HS-MCNC: 2.5 MG/DL (ref 0–0.4)
D DIMER: 315 NG/ML DDU
DATE ANALYZED: ABNORMAL
DATE ANALYZED: ABNORMAL
DATE OF COLLECTION: ABNORMAL
DATE OF COLLECTION: ABNORMAL
EKG ATRIAL RATE: 227 BPM
EKG Q-T INTERVAL: 228 MS
EKG QRS DURATION: 76 MS
EKG QTC CALCULATION (BAZETT): 393 MS
EKG R AXIS: 100 DEGREES
EKG T AXIS: -107 DEGREES
EKG VENTRICULAR RATE: 179 BPM
EOSINOPHIL # BLD: 0 E9/L (ref 0.05–0.5)
EOSINOPHIL # BLD: 0 E9/L (ref 0.05–0.5)
EOSINOPHIL NFR BLD: 0 % (ref 0–6)
EOSINOPHIL NFR BLD: 0.1 % (ref 0–6)
EPI CELLS #/AREA URNS HPF: ABNORMAL /HPF
ERYTHROCYTE [DISTWIDTH] IN BLOOD BY AUTOMATED COUNT: 18.4 FL (ref 11.5–15)
ERYTHROCYTE [DISTWIDTH] IN BLOOD BY AUTOMATED COUNT: 18.7 FL (ref 11.5–15)
ERYTHROCYTE [SEDIMENTATION RATE] IN BLOOD BY WESTERGREN METHOD: 8 MM/HR (ref 0–20)
FERRITIN SERPL-MCNC: 384 NG/ML
FLUAV RNA NPH QL NAA+NON-PROBE: NOT DETECTED
FLUBV RNA NPH QL NAA+NON-PROBE: NOT DETECTED
GLUCOSE SERPL-MCNC: 137 MG/DL (ref 74–99)
GLUCOSE SERPL-MCNC: 83 MG/DL (ref 74–99)
GLUCOSE UR STRIP-MCNC: 100 MG/DL
HADV DNA NPH QL NAA+NON-PROBE: NOT DETECTED
HCO3: 24.9 MMOL/L (ref 22–26)
HCO3: 27.3 MMOL/L (ref 22–26)
HCOV 229E RNA NPH QL NAA+NON-PROBE: NOT DETECTED
HCOV HKU1 RNA NPH QL NAA+NON-PROBE: NOT DETECTED
HCOV NL63 RNA NPH QL NAA+NON-PROBE: NOT DETECTED
HCOV OC43 RNA NPH QL NAA+NON-PROBE: NOT DETECTED
HCT VFR BLD AUTO: 31.8 % (ref 34–48)
HCT VFR BLD AUTO: 38.3 % (ref 34–48)
HGB BLD-MCNC: 10 G/DL (ref 11.5–15.5)
HGB BLD-MCNC: 8.4 G/DL (ref 11.5–15.5)
HGB UR QL STRIP: ABNORMAL
HHB: 1.6 % (ref 0–5)
HHB: 11.8 % (ref 0–5)
HMPV RNA NPH QL NAA+NON-PROBE: NOT DETECTED
HPIV1 RNA NPH QL NAA+NON-PROBE: NOT DETECTED
HPIV2 RNA NPH QL NAA+NON-PROBE: NOT DETECTED
HPIV3 RNA NPH QL NAA+NON-PROBE: NOT DETECTED
HPIV4 RNA NPH QL NAA+NON-PROBE: NOT DETECTED
HYPOCHROMIA: ABNORMAL
HYPOCHROMIA: ABNORMAL
INFLUENZA A BY PCR: NOT DETECTED
INFLUENZA B BY PCR: NOT DETECTED
KETONES UR STRIP-MCNC: 15 MG/DL
LAB: ABNORMAL
LAB: ABNORMAL
LACTATE BLDV-SCNC: 1.3 MMOL/L (ref 0.5–2.2)
LDH SERPL-CCNC: 123 U/L (ref 135–214)
LEGIONELLA AG UR QL: NORMAL
LEUKOCYTE ESTERASE UR QL STRIP: ABNORMAL
LIPASE: 16 U/L (ref 13–60)
LYMPHOCYTES # BLD: 0.32 E9/L (ref 1.5–4)
LYMPHOCYTES # BLD: 0.64 E9/L (ref 1.5–4)
LYMPHOCYTES NFR BLD: 4.3 % (ref 20–42)
LYMPHOCYTES NFR BLD: 6.1 % (ref 20–42)
Lab: ABNORMAL
Lab: ABNORMAL
M PNEUMO DNA NPH QL NAA+NON-PROBE: NOT DETECTED
MAGNESIUM SERPL-MCNC: 1.9 MG/DL (ref 1.6–2.6)
MAGNESIUM SERPL-MCNC: 1.9 MG/DL (ref 1.6–2.6)
MCH RBC QN AUTO: 26.1 PG (ref 26–35)
MCH RBC QN AUTO: 26.2 PG (ref 26–35)
MCHC RBC AUTO-ENTMCNC: 26.1 % (ref 32–34.5)
MCHC RBC AUTO-ENTMCNC: 26.4 % (ref 32–34.5)
MCV RBC AUTO: 100.5 FL (ref 80–99.9)
MCV RBC AUTO: 98.8 FL (ref 80–99.9)
METAMYELOCYTES NFR BLD MANUAL: 0.9 % (ref 0–1)
METER GLUCOSE: 102 MG/DL (ref 74–99)
METER GLUCOSE: 128 MG/DL (ref 74–99)
METER GLUCOSE: 131 MG/DL (ref 74–99)
METER GLUCOSE: 131 MG/DL (ref 74–99)
METER GLUCOSE: 156 MG/DL (ref 74–99)
METHB: 0.3 % (ref 0–1.5)
METHB: 0.4 % (ref 0–1.5)
MODE: ABNORMAL
MODE: ABNORMAL
MONOCYTES # BLD: 0.08 E9/L (ref 0.1–0.95)
MONOCYTES # BLD: 0.43 E9/L (ref 0.1–0.95)
MONOCYTES NFR BLD: 0.9 % (ref 2–12)
MONOCYTES NFR BLD: 4.4 % (ref 2–12)
MRSA SPEC QL CULT: NORMAL
MYELOCYTES NFR BLD MANUAL: 1.7 % (ref 0–0)
NEUTROPHILS # BLD: 7.6 E9/L (ref 1.8–7.3)
NEUTROPHILS # BLD: 9.42 E9/L (ref 1.8–7.3)
NEUTS SEG NFR BLD: 87.7 % (ref 43–80)
NEUTS SEG NFR BLD: 92.2 % (ref 43–80)
NITRITE UR QL STRIP: POSITIVE
O2 CONTENT: 12.8 ML/DL
O2 CONTENT: 14.1 ML/DL
O2 SATURATION: 88.1 % (ref 92–98.5)
O2 SATURATION: 98.4 % (ref 92–98.5)
O2HB: 87.3 % (ref 94–97)
O2HB: 97.7 % (ref 94–97)
OPERATOR ID: 514
OPERATOR ID: 797
OVALOCYTES: ABNORMAL
OVALOCYTES: ABNORMAL
PATIENT TEMP: 37 C
PATIENT TEMP: 37 C
PCO2: 51.8 MMHG (ref 35–45)
PCO2: 61 MMHG (ref 35–45)
PH BLOOD GAS: 7.27 (ref 7.35–7.45)
PH BLOOD GAS: 7.3 (ref 7.35–7.45)
PH UR STRIP: 5 [PH] (ref 5–9)
PHOSPHATE SERPL-MCNC: 3 MG/DL (ref 2.5–4.5)
PLATELET # BLD AUTO: 207 E9/L (ref 130–450)
PLATELET # BLD AUTO: 281 E9/L (ref 130–450)
PMV BLD AUTO: 10.8 FL (ref 7–12)
PMV BLD AUTO: 10.9 FL (ref 7–12)
PO2: 133.2 MMHG (ref 75–100)
PO2: 58.9 MMHG (ref 75–100)
POIKILOCYTES: ABNORMAL
POIKILOCYTES: ABNORMAL
POLYCHROMASIA: ABNORMAL
POLYCHROMASIA: ABNORMAL
POTASSIUM SERPL-SCNC: 3.3 MMOL/L (ref 3.5–5)
POTASSIUM SERPL-SCNC: 3.4 MMOL/L (ref 3.5–5)
PROCALCITONIN: 0.44 NG/ML (ref 0–0.08)
PROCALCITONIN: 0.46 NG/ML (ref 0–0.08)
PROT SERPL-MCNC: 5.7 G/DL (ref 6.4–8.3)
PROT UR STRIP-MCNC: 100 MG/DL
RBC # BLD AUTO: 3.22 E12/L (ref 3.5–5.5)
RBC # BLD AUTO: 3.81 E12/L (ref 3.5–5.5)
RBC #/AREA URNS HPF: >20 /HPF (ref 0–2)
RSV RNA NPH QL NAA+NON-PROBE: NOT DETECTED
RV+EV RNA NPH QL NAA+NON-PROBE: NOT DETECTED
S PNEUM AG SPEC QL: NORMAL
SARS-COV-2 RDRP RESP QL NAA+PROBE: DETECTED
SARS-COV-2 RNA NPH QL NAA+NON-PROBE: DETECTED
SCHISTOCYTES: ABNORMAL
SODIUM SERPL-SCNC: 139 MMOL/L (ref 132–146)
SODIUM SERPL-SCNC: 149 MMOL/L (ref 132–146)
SOURCE, BLOOD GAS: ABNORMAL
SOURCE, BLOOD GAS: ABNORMAL
SP GR UR STRIP: 1.02 (ref 1–1.03)
T4 SERPL-MCNC: 6.4 MCG/DL (ref 4.5–11.7)
TEAR DROP CELLS: ABNORMAL
TEAR DROP CELLS: ABNORMAL
THB: 10.1 G/DL (ref 11.5–16.5)
THB: 10.4 G/DL (ref 11.5–16.5)
TIME ANALYZED: 1115
TIME ANALYZED: 1212
TROPONIN, HIGH SENSITIVITY: 151 NG/L (ref 0–9)
TROPONIN, HIGH SENSITIVITY: 162 NG/L (ref 0–9)
TSH SERPL-MCNC: 0.86 UIU/ML (ref 0.27–4.2)
UROBILINOGEN UR STRIP-ACNC: 0.2 E.U./DL
WBC # BLD: 10.7 E9/L (ref 4.5–11.5)
WBC # BLD: 8 E9/L (ref 4.5–11.5)
WBC #/AREA URNS HPF: >20 /HPF (ref 0–5)
YEAST URNS QL MICRO: PRESENT /HPF

## 2023-01-01 PROCEDURE — 84484 ASSAY OF TROPONIN QUANT: CPT

## 2023-01-01 PROCEDURE — 86140 C-REACTIVE PROTEIN: CPT

## 2023-01-01 PROCEDURE — 99222 1ST HOSP IP/OBS MODERATE 55: CPT | Performed by: INTERNAL MEDICINE

## 2023-01-01 PROCEDURE — 82728 ASSAY OF FERRITIN: CPT

## 2023-01-01 PROCEDURE — 6360000002 HC RX W HCPCS

## 2023-01-01 PROCEDURE — 6360000002 HC RX W HCPCS: Performed by: INTERNAL MEDICINE

## 2023-01-01 PROCEDURE — 82805 BLOOD GASES W/O2 SATURATION: CPT

## 2023-01-01 PROCEDURE — 2580000003 HC RX 258: Performed by: INTERNAL MEDICINE

## 2023-01-01 PROCEDURE — 99291 CRITICAL CARE FIRST HOUR: CPT | Performed by: FAMILY MEDICINE

## 2023-01-01 PROCEDURE — A4216 STERILE WATER/SALINE, 10 ML: HCPCS | Performed by: INTERNAL MEDICINE

## 2023-01-01 PROCEDURE — 99232 SBSQ HOSP IP/OBS MODERATE 35: CPT | Performed by: INTERNAL MEDICINE

## 2023-01-01 PROCEDURE — 36415 COLL VENOUS BLD VENIPUNCTURE: CPT

## 2023-01-01 PROCEDURE — 71045 X-RAY EXAM CHEST 1 VIEW: CPT

## 2023-01-01 PROCEDURE — 2500000003 HC RX 250 WO HCPCS: Performed by: NURSE PRACTITIONER

## 2023-01-01 PROCEDURE — 2500000003 HC RX 250 WO HCPCS

## 2023-01-01 PROCEDURE — 85378 FIBRIN DEGRADE SEMIQUANT: CPT

## 2023-01-01 PROCEDURE — 83880 ASSAY OF NATRIURETIC PEPTIDE: CPT

## 2023-01-01 PROCEDURE — 2000000000 HC ICU R&B

## 2023-01-01 PROCEDURE — 84436 ASSAY OF TOTAL THYROXINE: CPT

## 2023-01-01 PROCEDURE — 83735 ASSAY OF MAGNESIUM: CPT

## 2023-01-01 PROCEDURE — 2580000003 HC RX 258: Performed by: NURSE PRACTITIONER

## 2023-01-01 PROCEDURE — 83690 ASSAY OF LIPASE: CPT

## 2023-01-01 PROCEDURE — 80048 BASIC METABOLIC PNL TOTAL CA: CPT

## 2023-01-01 PROCEDURE — 2580000003 HC RX 258

## 2023-01-01 PROCEDURE — 2700000000 HC OXYGEN THERAPY PER DAY

## 2023-01-01 PROCEDURE — 82962 GLUCOSE BLOOD TEST: CPT

## 2023-01-01 PROCEDURE — 6360000002 HC RX W HCPCS: Performed by: NURSE PRACTITIONER

## 2023-01-01 PROCEDURE — 83605 ASSAY OF LACTIC ACID: CPT

## 2023-01-01 PROCEDURE — 93005 ELECTROCARDIOGRAM TRACING: CPT

## 2023-01-01 PROCEDURE — 87449 NOS EACH ORGANISM AG IA: CPT

## 2023-01-01 PROCEDURE — 90935 HEMODIALYSIS ONE EVALUATION: CPT

## 2023-01-01 PROCEDURE — 84145 PROCALCITONIN (PCT): CPT

## 2023-01-01 PROCEDURE — 6360000002 HC RX W HCPCS: Performed by: FAMILY MEDICINE

## 2023-01-01 PROCEDURE — 87502 INFLUENZA DNA AMP PROBE: CPT

## 2023-01-01 PROCEDURE — 99291 CRITICAL CARE FIRST HOUR: CPT | Performed by: INTERNAL MEDICINE

## 2023-01-01 PROCEDURE — 84100 ASSAY OF PHOSPHORUS: CPT

## 2023-01-01 PROCEDURE — 85651 RBC SED RATE NONAUTOMATED: CPT

## 2023-01-01 PROCEDURE — 2060000000 HC ICU INTERMEDIATE R&B

## 2023-01-01 PROCEDURE — 2500000003 HC RX 250 WO HCPCS: Performed by: INTERNAL MEDICINE

## 2023-01-01 PROCEDURE — 2580000003 HC RX 258: Performed by: FAMILY MEDICINE

## 2023-01-01 PROCEDURE — 93010 ELECTROCARDIOGRAM REPORT: CPT | Performed by: INTERNAL MEDICINE

## 2023-01-01 PROCEDURE — P9047 ALBUMIN (HUMAN), 25%, 50ML: HCPCS | Performed by: FAMILY MEDICINE

## 2023-01-01 PROCEDURE — 6360000002 HC RX W HCPCS: Performed by: EMERGENCY MEDICINE

## 2023-01-01 PROCEDURE — 85025 COMPLETE CBC W/AUTO DIFF WBC: CPT

## 2023-01-01 PROCEDURE — 94660 CPAP INITIATION&MGMT: CPT

## 2023-01-01 PROCEDURE — 5A1D70Z PERFORMANCE OF URINARY FILTRATION, INTERMITTENT, LESS THAN 6 HOURS PER DAY: ICD-10-PCS | Performed by: INTERNAL MEDICINE

## 2023-01-01 PROCEDURE — 87040 BLOOD CULTURE FOR BACTERIA: CPT

## 2023-01-01 PROCEDURE — 87081 CULTURE SCREEN ONLY: CPT

## 2023-01-01 PROCEDURE — 81001 URINALYSIS AUTO W/SCOPE: CPT

## 2023-01-01 PROCEDURE — 80053 COMPREHEN METABOLIC PANEL: CPT

## 2023-01-01 PROCEDURE — 5A09457 ASSISTANCE WITH RESPIRATORY VENTILATION, 24-96 CONSECUTIVE HOURS, CONTINUOUS POSITIVE AIRWAY PRESSURE: ICD-10-PCS | Performed by: INTERNAL MEDICINE

## 2023-01-01 PROCEDURE — 99285 EMERGENCY DEPT VISIT HI MDM: CPT

## 2023-01-01 PROCEDURE — 83615 LACTATE (LD) (LDH) ENZYME: CPT

## 2023-01-01 PROCEDURE — C9113 INJ PANTOPRAZOLE SODIUM, VIA: HCPCS | Performed by: INTERNAL MEDICINE

## 2023-01-01 PROCEDURE — APPSS60 APP SPLIT SHARED TIME 46-60 MINUTES: Performed by: PHYSICIAN ASSISTANT

## 2023-01-01 PROCEDURE — 36600 WITHDRAWAL OF ARTERIAL BLOOD: CPT

## 2023-01-01 PROCEDURE — 0202U NFCT DS 22 TRGT SARS-COV-2: CPT

## 2023-01-01 PROCEDURE — 6370000000 HC RX 637 (ALT 250 FOR IP): Performed by: INTERNAL MEDICINE

## 2023-01-01 PROCEDURE — 87635 SARS-COV-2 COVID-19 AMP PRB: CPT

## 2023-01-01 PROCEDURE — 96374 THER/PROPH/DIAG INJ IV PUSH: CPT

## 2023-01-01 PROCEDURE — 96375 TX/PRO/DX INJ NEW DRUG ADDON: CPT

## 2023-01-01 PROCEDURE — 2580000003 HC RX 258: Performed by: EMERGENCY MEDICINE

## 2023-01-01 PROCEDURE — 84443 ASSAY THYROID STIM HORMONE: CPT

## 2023-01-01 PROCEDURE — 94640 AIRWAY INHALATION TREATMENT: CPT

## 2023-01-01 PROCEDURE — 70450 CT HEAD/BRAIN W/O DYE: CPT

## 2023-01-01 PROCEDURE — 1200000000 HC SEMI PRIVATE

## 2023-01-01 RX ORDER — ALBUMIN (HUMAN) 12.5 G/50ML
SOLUTION INTRAVENOUS
Status: DISCONTINUED
Start: 2023-01-01 | End: 2023-01-01 | Stop reason: WASHOUT

## 2023-01-01 RX ORDER — LOPERAMIDE HYDROCHLORIDE 2 MG/1
2 CAPSULE ORAL 4 TIMES DAILY PRN
Status: DISCONTINUED | OUTPATIENT
Start: 2023-01-01 | End: 2023-01-01

## 2023-01-01 RX ORDER — SERTRALINE HYDROCHLORIDE 25 MG/1
50 TABLET, FILM COATED ORAL DAILY
Status: DISCONTINUED | OUTPATIENT
Start: 2023-01-01 | End: 2023-01-01

## 2023-01-01 RX ORDER — AMIODARONE HYDROCHLORIDE 200 MG/1
400 TABLET ORAL EVERY 8 HOURS
Status: DISCONTINUED | OUTPATIENT
Start: 2023-01-01 | End: 2023-01-01

## 2023-01-01 RX ORDER — SUCRALFATE 1 G/1
1 TABLET ORAL 4 TIMES DAILY
Status: DISCONTINUED | OUTPATIENT
Start: 2023-01-01 | End: 2023-01-01

## 2023-01-01 RX ORDER — MONTELUKAST SODIUM 10 MG/1
10 TABLET ORAL NIGHTLY
Status: DISCONTINUED | OUTPATIENT
Start: 2023-01-01 | End: 2023-01-01

## 2023-01-01 RX ORDER — ROPINIROLE 1 MG/1
1 TABLET, FILM COATED ORAL 3 TIMES DAILY
Status: DISCONTINUED | OUTPATIENT
Start: 2023-01-01 | End: 2023-01-01

## 2023-01-01 RX ORDER — DEXTROSE MONOHYDRATE 25 G/50ML
25 INJECTION, SOLUTION INTRAVENOUS PRN
Status: DISCONTINUED | OUTPATIENT
Start: 2023-01-01 | End: 2023-01-01

## 2023-01-01 RX ORDER — INSULIN LISPRO 100 [IU]/ML
0-4 INJECTION, SOLUTION INTRAVENOUS; SUBCUTANEOUS NIGHTLY
Status: DISCONTINUED | OUTPATIENT
Start: 2023-01-01 | End: 2023-01-01

## 2023-01-01 RX ORDER — MIDODRINE HYDROCHLORIDE 5 MG/1
15 TABLET ORAL
Status: DISCONTINUED | OUTPATIENT
Start: 2023-01-01 | End: 2023-01-01

## 2023-01-01 RX ORDER — DEXAMETHASONE SODIUM PHOSPHATE 10 MG/ML
6 INJECTION INTRAMUSCULAR; INTRAVENOUS EVERY 24 HOURS
Status: DISCONTINUED | OUTPATIENT
Start: 2023-01-01 | End: 2023-01-01

## 2023-01-01 RX ORDER — METOPROLOL SUCCINATE 25 MG/1
25 TABLET, EXTENDED RELEASE ORAL DAILY
Status: DISCONTINUED | OUTPATIENT
Start: 2023-01-01 | End: 2023-01-01

## 2023-01-01 RX ORDER — INSULIN GLARGINE 100 [IU]/ML
13 INJECTION, SOLUTION SUBCUTANEOUS 2 TIMES DAILY
Status: DISCONTINUED | OUTPATIENT
Start: 2023-01-01 | End: 2023-01-01

## 2023-01-01 RX ORDER — DILTIAZEM HYDROCHLORIDE 5 MG/ML
10 INJECTION INTRAVENOUS ONCE
Status: COMPLETED | OUTPATIENT
Start: 2023-01-01 | End: 2023-01-01

## 2023-01-01 RX ORDER — DEXAMETHASONE SODIUM PHOSPHATE 10 MG/ML
10 INJECTION INTRAMUSCULAR; INTRAVENOUS EVERY 24 HOURS
Status: DISCONTINUED | OUTPATIENT
Start: 2023-01-01 | End: 2023-01-01

## 2023-01-01 RX ORDER — DEXAMETHASONE SODIUM PHOSPHATE 10 MG/ML
10 INJECTION INTRAMUSCULAR; INTRAVENOUS ONCE
Status: COMPLETED | OUTPATIENT
Start: 2023-01-01 | End: 2023-01-01

## 2023-01-01 RX ORDER — IPRATROPIUM BROMIDE AND ALBUTEROL SULFATE 2.5; .5 MG/3ML; MG/3ML
1 SOLUTION RESPIRATORY (INHALATION) 4 TIMES DAILY
Status: DISCONTINUED | OUTPATIENT
Start: 2023-01-01 | End: 2023-01-01

## 2023-01-01 RX ORDER — POTASSIUM CHLORIDE 7.45 MG/ML
10 INJECTION INTRAVENOUS ONCE
Status: DISCONTINUED | OUTPATIENT
Start: 2023-01-01 | End: 2023-01-01

## 2023-01-01 RX ORDER — MAGNESIUM SULFATE IN WATER 40 MG/ML
2000 INJECTION, SOLUTION INTRAVENOUS ONCE
Status: COMPLETED | OUTPATIENT
Start: 2023-01-01 | End: 2023-01-01

## 2023-01-01 RX ORDER — FERROUS SULFATE 325(65) MG
325 TABLET ORAL
Status: DISCONTINUED | OUTPATIENT
Start: 2023-01-01 | End: 2023-01-01

## 2023-01-01 RX ORDER — 0.9 % SODIUM CHLORIDE 0.9 %
500 INTRAVENOUS SOLUTION INTRAVENOUS ONCE
Status: COMPLETED | OUTPATIENT
Start: 2023-01-01 | End: 2023-01-01

## 2023-01-01 RX ORDER — MORPHINE SULFATE 2 MG/ML
2 INJECTION, SOLUTION INTRAMUSCULAR; INTRAVENOUS
Status: DISCONTINUED | OUTPATIENT
Start: 2023-01-01 | End: 2023-01-01 | Stop reason: HOSPADM

## 2023-01-01 RX ORDER — METOPROLOL TARTRATE 5 MG/5ML
5 INJECTION INTRAVENOUS EVERY 6 HOURS
Status: DISCONTINUED | OUTPATIENT
Start: 2023-01-01 | End: 2023-01-01

## 2023-01-01 RX ORDER — ATORVASTATIN CALCIUM 20 MG/1
20 TABLET, FILM COATED ORAL NIGHTLY
Status: DISCONTINUED | OUTPATIENT
Start: 2023-01-01 | End: 2023-01-01

## 2023-01-01 RX ORDER — HEPARIN SODIUM 5000 [USP'U]/ML
5000 INJECTION, SOLUTION INTRAVENOUS; SUBCUTANEOUS EVERY 8 HOURS SCHEDULED
Status: DISCONTINUED | OUTPATIENT
Start: 2023-01-01 | End: 2023-01-01

## 2023-01-01 RX ORDER — ACETAMINOPHEN 325 MG/1
650 TABLET ORAL EVERY 4 HOURS PRN
Status: DISCONTINUED | OUTPATIENT
Start: 2023-01-01 | End: 2023-01-01

## 2023-01-01 RX ORDER — DOCUSATE SODIUM 100 MG/1
100 CAPSULE, LIQUID FILLED ORAL 2 TIMES DAILY PRN
Status: DISCONTINUED | OUTPATIENT
Start: 2023-01-01 | End: 2023-01-01

## 2023-01-01 RX ORDER — QUETIAPINE FUMARATE 25 MG/1
25 TABLET, FILM COATED ORAL NIGHTLY
Status: DISCONTINUED | OUTPATIENT
Start: 2023-01-01 | End: 2023-01-01

## 2023-01-01 RX ORDER — BUDESONIDE AND FORMOTEROL FUMARATE DIHYDRATE 160; 4.5 UG/1; UG/1
2 AEROSOL RESPIRATORY (INHALATION) 2 TIMES DAILY
Status: DISCONTINUED | OUTPATIENT
Start: 2023-01-01 | End: 2023-01-01

## 2023-01-01 RX ORDER — METOPROLOL TARTRATE 5 MG/5ML
5 INJECTION INTRAVENOUS EVERY 6 HOURS PRN
Status: DISCONTINUED | OUTPATIENT
Start: 2023-01-01 | End: 2023-01-01

## 2023-01-01 RX ORDER — ALBUMIN (HUMAN) 12.5 G/50ML
25 SOLUTION INTRAVENOUS ONCE
Status: COMPLETED | OUTPATIENT
Start: 2023-01-01 | End: 2023-01-01

## 2023-01-01 RX ORDER — LEVOTHYROXINE SODIUM 0.1 MG/1
200 TABLET ORAL DAILY
Status: DISCONTINUED | OUTPATIENT
Start: 2023-01-01 | End: 2023-01-01

## 2023-01-01 RX ORDER — ACETAMINOPHEN 650 MG/1
650 SUPPOSITORY RECTAL EVERY 6 HOURS PRN
Status: DISCONTINUED | OUTPATIENT
Start: 2023-01-01 | End: 2023-01-01

## 2023-01-01 RX ORDER — SODIUM CHLORIDE 9 MG/ML
INJECTION, SOLUTION INTRAVENOUS
Status: COMPLETED
Start: 2023-01-01 | End: 2023-01-01

## 2023-01-01 RX ORDER — LORAZEPAM 2 MG/ML
1 INJECTION INTRAMUSCULAR
Status: DISCONTINUED | OUTPATIENT
Start: 2023-01-01 | End: 2023-01-01 | Stop reason: HOSPADM

## 2023-01-01 RX ORDER — 0.9 % SODIUM CHLORIDE 0.9 %
1000 INTRAVENOUS SOLUTION INTRAVENOUS ONCE
Status: COMPLETED | OUTPATIENT
Start: 2023-01-01 | End: 2023-01-01

## 2023-01-01 RX ORDER — CARBIDOPA AND LEVODOPA 25; 100 MG/1; MG/1
2 TABLET, EXTENDED RELEASE ORAL 3 TIMES DAILY
Status: DISCONTINUED | OUTPATIENT
Start: 2023-01-01 | End: 2023-01-01

## 2023-01-01 RX ORDER — METOPROLOL TARTRATE 50 MG/1
50 TABLET, FILM COATED ORAL EVERY 8 HOURS
Status: DISCONTINUED | OUTPATIENT
Start: 2023-01-01 | End: 2023-01-01

## 2023-01-01 RX ORDER — INSULIN LISPRO 100 [IU]/ML
0-4 INJECTION, SOLUTION INTRAVENOUS; SUBCUTANEOUS EVERY 6 HOURS
Status: DISCONTINUED | OUTPATIENT
Start: 2023-01-01 | End: 2023-01-01

## 2023-01-01 RX ORDER — DIVALPROEX SODIUM 250 MG/1
250 TABLET, DELAYED RELEASE ORAL 2 TIMES DAILY
Status: DISCONTINUED | OUTPATIENT
Start: 2023-01-01 | End: 2023-01-01

## 2023-01-01 RX ORDER — BUMETANIDE 0.25 MG/ML
1 INJECTION INTRAMUSCULAR; INTRAVENOUS DAILY
Status: DISCONTINUED | OUTPATIENT
Start: 2023-01-01 | End: 2023-01-01

## 2023-01-01 RX ORDER — DEXTROSE MONOHYDRATE 100 MG/ML
INJECTION, SOLUTION INTRAVENOUS CONTINUOUS PRN
Status: DISCONTINUED | OUTPATIENT
Start: 2023-01-01 | End: 2023-01-01

## 2023-01-01 RX ORDER — MAGNESIUM SULFATE 1 G/100ML
1000 INJECTION INTRAVENOUS ONCE
Status: COMPLETED | OUTPATIENT
Start: 2023-01-01 | End: 2023-01-01

## 2023-01-01 RX ORDER — FOLIC ACID/VIT B COMPLEX AND C 0.8 MG
1 TABLET ORAL DAILY
Status: DISCONTINUED | OUTPATIENT
Start: 2023-01-01 | End: 2023-01-01

## 2023-01-01 RX ORDER — MORPHINE SULFATE 2 MG/ML
2 INJECTION, SOLUTION INTRAMUSCULAR; INTRAVENOUS
Status: DISCONTINUED | OUTPATIENT
Start: 2023-01-01 | End: 2023-01-01

## 2023-01-01 RX ORDER — ARFORMOTEROL TARTRATE 15 UG/2ML
15 SOLUTION RESPIRATORY (INHALATION) 2 TIMES DAILY
Status: DISCONTINUED | OUTPATIENT
Start: 2023-01-01 | End: 2023-01-01

## 2023-01-01 RX ORDER — BUMETANIDE 1 MG/1
1 TABLET ORAL DAILY
Status: DISCONTINUED | OUTPATIENT
Start: 2023-01-01 | End: 2023-01-01

## 2023-01-01 RX ORDER — POLYETHYLENE GLYCOL 3350 17 G/17G
17 POWDER, FOR SOLUTION ORAL DAILY PRN
Status: DISCONTINUED | OUTPATIENT
Start: 2023-01-01 | End: 2023-01-01

## 2023-01-01 RX ORDER — LORAZEPAM 2 MG/ML
1 INJECTION INTRAMUSCULAR
Status: DISCONTINUED | OUTPATIENT
Start: 2023-01-01 | End: 2023-01-01

## 2023-01-01 RX ORDER — BUDESONIDE 0.5 MG/2ML
0.5 INHALANT ORAL 2 TIMES DAILY
Status: DISCONTINUED | OUTPATIENT
Start: 2023-01-01 | End: 2023-01-01

## 2023-01-01 RX ORDER — GLYCOPYRROLATE 0.2 MG/ML
0.2 INJECTION INTRAMUSCULAR; INTRAVENOUS EVERY 4 HOURS PRN
Status: DISCONTINUED | OUTPATIENT
Start: 2023-01-01 | End: 2023-01-01 | Stop reason: HOSPADM

## 2023-01-01 RX ORDER — INSULIN LISPRO 100 [IU]/ML
0-4 INJECTION, SOLUTION INTRAVENOUS; SUBCUTANEOUS
Status: DISCONTINUED | OUTPATIENT
Start: 2023-01-01 | End: 2023-01-01

## 2023-01-01 RX ORDER — POTASSIUM CHLORIDE 7.45 MG/ML
10 INJECTION INTRAVENOUS ONCE
Status: COMPLETED | OUTPATIENT
Start: 2023-01-01 | End: 2023-01-01

## 2023-01-01 RX ORDER — PANTOPRAZOLE SODIUM 40 MG/1
40 TABLET, DELAYED RELEASE ORAL EVERY MORNING
Status: DISCONTINUED | OUTPATIENT
Start: 2023-01-01 | End: 2023-01-01

## 2023-01-01 RX ADMIN — SODIUM CHLORIDE 40 MG: 9 INJECTION INTRAMUSCULAR; INTRAVENOUS; SUBCUTANEOUS at 21:38

## 2023-01-01 RX ADMIN — SODIUM CHLORIDE 40 MG: 9 INJECTION INTRAMUSCULAR; INTRAVENOUS; SUBCUTANEOUS at 08:44

## 2023-01-01 RX ADMIN — MAGNESIUM SULFATE HEPTAHYDRATE 2000 MG: 40 INJECTION, SOLUTION INTRAVENOUS at 11:39

## 2023-01-01 RX ADMIN — LORAZEPAM 1 MG: 2 INJECTION INTRAMUSCULAR; INTRAVENOUS at 03:01

## 2023-01-01 RX ADMIN — LORAZEPAM 1 MG: 2 INJECTION INTRAMUSCULAR; INTRAVENOUS at 17:54

## 2023-01-01 RX ADMIN — LORAZEPAM 1 MG: 2 INJECTION INTRAMUSCULAR; INTRAVENOUS at 18:25

## 2023-01-01 RX ADMIN — DILTIAZEM HYDROCHLORIDE 10 MG: 5 INJECTION INTRAVENOUS at 09:42

## 2023-01-01 RX ADMIN — MORPHINE SULFATE 2 MG: 2 INJECTION, SOLUTION INTRAMUSCULAR; INTRAVENOUS at 18:24

## 2023-01-01 RX ADMIN — DEXTROSE 1 MG/MIN: 5 SOLUTION INTRAVENOUS at 23:45

## 2023-01-01 RX ADMIN — LORAZEPAM 1 MG: 2 INJECTION INTRAMUSCULAR; INTRAVENOUS at 01:30

## 2023-01-01 RX ADMIN — MORPHINE SULFATE 1 MG/HR: 10 INJECTION, SOLUTION INTRAMUSCULAR; INTRAVENOUS at 20:49

## 2023-01-01 RX ADMIN — MORPHINE SULFATE 2 MG: 2 INJECTION, SOLUTION INTRAMUSCULAR; INTRAVENOUS at 20:03

## 2023-01-01 RX ADMIN — LORAZEPAM 1 MG: 2 INJECTION INTRAMUSCULAR; INTRAVENOUS at 23:53

## 2023-01-01 RX ADMIN — ARFORMOTEROL TARTRATE 15 MCG: 15 SOLUTION RESPIRATORY (INHALATION) at 04:19

## 2023-01-01 RX ADMIN — LORAZEPAM 1 MG: 2 INJECTION INTRAMUSCULAR; INTRAVENOUS at 17:16

## 2023-01-01 RX ADMIN — MORPHINE SULFATE 2 MG: 2 INJECTION, SOLUTION INTRAMUSCULAR; INTRAVENOUS at 13:17

## 2023-01-01 RX ADMIN — DILTIAZEM HYDROCHLORIDE 5 MG/HR: 5 INJECTION INTRAVENOUS at 09:57

## 2023-01-01 RX ADMIN — LORAZEPAM 1 MG: 2 INJECTION INTRAMUSCULAR; INTRAVENOUS at 18:57

## 2023-01-01 RX ADMIN — MORPHINE SULFATE 2 MG: 2 INJECTION, SOLUTION INTRAMUSCULAR; INTRAVENOUS at 09:58

## 2023-01-01 RX ADMIN — MORPHINE SULFATE 2 MG: 2 INJECTION, SOLUTION INTRAMUSCULAR; INTRAVENOUS at 16:39

## 2023-01-01 RX ADMIN — MORPHINE SULFATE 2 MG: 2 INJECTION, SOLUTION INTRAMUSCULAR; INTRAVENOUS at 18:57

## 2023-01-01 RX ADMIN — HEPARIN SODIUM 5000 UNITS: 5000 INJECTION INTRAVENOUS; SUBCUTANEOUS at 07:07

## 2023-01-01 RX ADMIN — DOXYCYCLINE 100 MG: 100 INJECTION, POWDER, LYOPHILIZED, FOR SOLUTION INTRAVENOUS at 19:35

## 2023-01-01 RX ADMIN — DEXAMETHASONE SODIUM PHOSPHATE 10 MG: 10 INJECTION INTRAMUSCULAR; INTRAVENOUS at 19:26

## 2023-01-01 RX ADMIN — MORPHINE SULFATE 2 MG: 2 INJECTION, SOLUTION INTRAMUSCULAR; INTRAVENOUS at 01:31

## 2023-01-01 RX ADMIN — SODIUM CHLORIDE 125 MG: 9 INJECTION, SOLUTION INTRAVENOUS at 22:10

## 2023-01-01 RX ADMIN — SODIUM CHLORIDE 500 ML: 9 INJECTION, SOLUTION INTRAVENOUS at 11:40

## 2023-01-01 RX ADMIN — HEPARIN SODIUM 5000 UNITS: 5000 INJECTION INTRAVENOUS; SUBCUTANEOUS at 21:44

## 2023-01-01 RX ADMIN — MORPHINE SULFATE 2 MG: 2 INJECTION, SOLUTION INTRAMUSCULAR; INTRAVENOUS at 14:39

## 2023-01-01 RX ADMIN — IPRATROPIUM BROMIDE AND ALBUTEROL SULFATE 3 ML: 2.5; .5 SOLUTION RESPIRATORY (INHALATION) at 11:10

## 2023-01-01 RX ADMIN — LORAZEPAM 1 MG: 2 INJECTION INTRAMUSCULAR; INTRAVENOUS at 16:39

## 2023-01-01 RX ADMIN — MORPHINE SULFATE 2 MG: 2 INJECTION, SOLUTION INTRAMUSCULAR; INTRAVENOUS at 17:09

## 2023-01-01 RX ADMIN — Medication 500 ML: at 09:50

## 2023-01-01 RX ADMIN — IPRATROPIUM BROMIDE AND ALBUTEROL SULFATE 3 ML: 2.5; .5 SOLUTION RESPIRATORY (INHALATION) at 04:19

## 2023-01-01 RX ADMIN — BUDESONIDE 500 MCG: 0.5 SUSPENSION RESPIRATORY (INHALATION) at 04:19

## 2023-01-01 RX ADMIN — SODIUM CHLORIDE 500 ML: 9 INJECTION, SOLUTION INTRAVENOUS at 09:50

## 2023-01-01 RX ADMIN — VANCOMYCIN HYDROCHLORIDE 1000 MG: 1 INJECTION, POWDER, LYOPHILIZED, FOR SOLUTION INTRAVENOUS at 05:50

## 2023-01-01 RX ADMIN — ALBUMIN (HUMAN) 25 G: 0.25 INJECTION, SOLUTION INTRAVENOUS at 03:00

## 2023-01-01 RX ADMIN — LORAZEPAM 1 MG: 2 INJECTION INTRAMUSCULAR; INTRAVENOUS at 01:05

## 2023-01-01 RX ADMIN — MORPHINE SULFATE 2 MG: 2 INJECTION, SOLUTION INTRAMUSCULAR; INTRAVENOUS at 15:56

## 2023-01-01 RX ADMIN — LORAZEPAM 1 MG: 2 INJECTION INTRAMUSCULAR; INTRAVENOUS at 20:03

## 2023-01-01 RX ADMIN — MORPHINE SULFATE 2 MG: 2 INJECTION, SOLUTION INTRAMUSCULAR; INTRAVENOUS at 17:53

## 2023-01-01 RX ADMIN — MORPHINE SULFATE 2 MG: 2 INJECTION, SOLUTION INTRAMUSCULAR; INTRAVENOUS at 04:59

## 2023-01-01 RX ADMIN — MORPHINE SULFATE 2 MG: 2 INJECTION, SOLUTION INTRAMUSCULAR; INTRAVENOUS at 16:57

## 2023-01-01 RX ADMIN — DEXTROSE MONOHYDRATE 150 MG: 50 INJECTION, SOLUTION INTRAVENOUS at 23:25

## 2023-01-01 RX ADMIN — LORAZEPAM 1 MG: 2 INJECTION INTRAMUSCULAR; INTRAVENOUS at 04:59

## 2023-01-01 RX ADMIN — INSULIN GLARGINE 13 UNITS: 100 INJECTION, SOLUTION SUBCUTANEOUS at 09:37

## 2023-01-01 RX ADMIN — LORAZEPAM 1 MG: 2 INJECTION INTRAMUSCULAR; INTRAVENOUS at 20:12

## 2023-01-01 RX ADMIN — METOPROLOL TARTRATE 5 MG: 5 INJECTION, SOLUTION INTRAVENOUS at 08:45

## 2023-01-01 RX ADMIN — MORPHINE SULFATE 2 MG: 2 INJECTION, SOLUTION INTRAMUSCULAR; INTRAVENOUS at 23:54

## 2023-01-01 RX ADMIN — MORPHINE SULFATE 2 MG: 2 INJECTION, SOLUTION INTRAMUSCULAR; INTRAVENOUS at 11:44

## 2023-01-01 RX ADMIN — MORPHINE SULFATE 2 MG: 2 INJECTION, SOLUTION INTRAMUSCULAR; INTRAVENOUS at 20:12

## 2023-01-01 RX ADMIN — SODIUM CHLORIDE 500 ML: 9 INJECTION, SOLUTION INTRAVENOUS at 02:26

## 2023-01-01 RX ADMIN — GLYCOPYRROLATE 0.2 MG: 0.2 INJECTION INTRAMUSCULAR; INTRAVENOUS at 01:30

## 2023-01-01 RX ADMIN — LORAZEPAM 1 MG: 2 INJECTION INTRAMUSCULAR; INTRAVENOUS at 16:57

## 2023-01-01 RX ADMIN — AMIODARONE HYDROCHLORIDE 0.5 MG/MIN: 50 INJECTION, SOLUTION INTRAVENOUS at 15:13

## 2023-01-01 RX ADMIN — MORPHINE SULFATE 2 MG: 2 INJECTION, SOLUTION INTRAMUSCULAR; INTRAVENOUS at 18:25

## 2023-01-01 RX ADMIN — MORPHINE SULFATE 2 MG: 2 INJECTION, SOLUTION INTRAMUSCULAR; INTRAVENOUS at 10:31

## 2023-01-01 RX ADMIN — DEXAMETHASONE SODIUM PHOSPHATE 6 MG: 10 INJECTION INTRAMUSCULAR; INTRAVENOUS at 03:49

## 2023-01-01 RX ADMIN — MORPHINE SULFATE 2 MG: 2 INJECTION, SOLUTION INTRAMUSCULAR; INTRAVENOUS at 17:16

## 2023-01-01 RX ADMIN — MAGNESIUM SULFATE HEPTAHYDRATE 1000 MG: 1 INJECTION, SOLUTION INTRAVENOUS at 06:10

## 2023-01-01 RX ADMIN — WATER 1000 MG: 1 INJECTION INTRAMUSCULAR; INTRAVENOUS; SUBCUTANEOUS at 19:27

## 2023-01-01 RX ADMIN — BUMETANIDE 1 MG: 0.25 INJECTION INTRAMUSCULAR; INTRAVENOUS at 21:48

## 2023-01-01 RX ADMIN — POTASSIUM CHLORIDE 10 MEQ: 7.46 INJECTION, SOLUTION INTRAVENOUS at 06:11

## 2023-01-01 RX ADMIN — IPRATROPIUM BROMIDE AND ALBUTEROL SULFATE 3 ML: 2.5; .5 SOLUTION RESPIRATORY (INHALATION) at 14:28

## 2023-01-01 RX ADMIN — CEFEPIME 1000 MG: 1 INJECTION, POWDER, FOR SOLUTION INTRAMUSCULAR; INTRAVENOUS at 05:57

## 2023-01-01 RX ADMIN — MORPHINE SULFATE 2 MG: 2 INJECTION, SOLUTION INTRAMUSCULAR; INTRAVENOUS at 03:01

## 2023-01-01 ASSESSMENT — PAIN SCALES - GENERAL
PAINLEVEL_OUTOF10: 0

## 2023-01-01 ASSESSMENT — PAIN DESCRIPTION - ORIENTATION: ORIENTATION: RIGHT;LEFT;LOWER

## 2023-01-01 ASSESSMENT — PAIN DESCRIPTION - DESCRIPTORS: DESCRIPTORS: ACHING;DISCOMFORT

## 2023-01-01 ASSESSMENT — PAIN DESCRIPTION - LOCATION: LOCATION: LEG;FOOT

## 2023-01-03 ENCOUNTER — HOSPITAL ENCOUNTER (OUTPATIENT)
Dept: OTHER | Age: 73
Setting detail: THERAPIES SERIES
Discharge: HOME | End: 2023-01-03
Payer: MEDICARE

## 2023-01-03 VITALS
OXYGEN SATURATION: 99 % | WEIGHT: 249 LBS | RESPIRATION RATE: 20 BRPM | DIASTOLIC BLOOD PRESSURE: 55 MMHG | SYSTOLIC BLOOD PRESSURE: 113 MMHG | BODY MASS INDEX: 48.63 KG/M2 | HEART RATE: 96 BPM

## 2023-01-03 LAB
ANION GAP SERPL CALCULATED.3IONS-SCNC: 11 MMOL/L (ref 7–16)
BUN BLDV-MCNC: 25 MG/DL (ref 6–23)
CALCIUM SERPL-MCNC: 9.2 MG/DL (ref 8.6–10.2)
CHLORIDE BLD-SCNC: 94 MMOL/L (ref 98–107)
CO2: 39 MMOL/L (ref 22–29)
CREAT SERPL-MCNC: 1.3 MG/DL (ref 0.5–1)
GFR SERPL CREATININE-BSD FRML MDRD: 43 ML/MIN/1.73
GLUCOSE BLD-MCNC: 161 MG/DL (ref 74–99)
POTASSIUM SERPL-SCNC: 3.5 MMOL/L (ref 3.5–5)
PRO-BNP: ABNORMAL PG/ML (ref 0–125)
SODIUM BLD-SCNC: 144 MMOL/L (ref 132–146)

## 2023-01-03 PROCEDURE — 99214 OFFICE O/P EST MOD 30 MIN: CPT

## 2023-01-03 PROCEDURE — 36415 COLL VENOUS BLD VENIPUNCTURE: CPT

## 2023-01-03 PROCEDURE — 76937 US GUIDE VASCULAR ACCESS: CPT

## 2023-01-03 PROCEDURE — 2580000003 HC RX 258: Performed by: NURSE PRACTITIONER

## 2023-01-03 PROCEDURE — 96374 THER/PROPH/DIAG INJ IV PUSH: CPT

## 2023-01-03 PROCEDURE — 83880 ASSAY OF NATRIURETIC PEPTIDE: CPT

## 2023-01-03 PROCEDURE — 2500000003 HC RX 250 WO HCPCS: Performed by: NURSE PRACTITIONER

## 2023-01-03 PROCEDURE — 80048 BASIC METABOLIC PNL TOTAL CA: CPT

## 2023-01-03 RX ORDER — SODIUM CHLORIDE 0.9 % (FLUSH) 0.9 %
10 SYRINGE (ML) INJECTION ONCE
Status: COMPLETED | OUTPATIENT
Start: 2023-01-03 | End: 2023-01-03

## 2023-01-03 RX ORDER — BUMETANIDE 0.25 MG/ML
2 INJECTION, SOLUTION INTRAMUSCULAR; INTRAVENOUS ONCE
Status: COMPLETED | OUTPATIENT
Start: 2023-01-03 | End: 2023-01-03

## 2023-01-03 RX ADMIN — BUMETANIDE 2 MG: 0.25 INJECTION, SOLUTION INTRAMUSCULAR; INTRAVENOUS at 09:18

## 2023-01-03 RX ADMIN — Medication 10 ML: at 09:18

## 2023-01-03 NOTE — PROGRESS NOTES
Congestive Heart Failure [address]     Golden Flores   1950    Referring Provider: Kelley VINCENT   Primary Care Physician: Dr. Nai Delgado   Cardiologist: Laureen VINCENT   Nephrologist: ESDRAS                                 Resides at [facility] in [place]     History of Present Illness:   Golden Flores is a 67 y.o. female with a history of HFpEF, most recent EF 70-75% TTE. Patient Story: She does have increased dyspnea with exertion, shortness of breath, or decline in overall functional capacity. She does not have orthopnea, PND, nocturnal cough or hemoptysis. She does have abdominal distention or bloating, early satiety, anorexia/change in appetite. She does not have a good urinary response to oral diuretic per patient. Encouraged to drink about 64 oz daily to stay hydrated. She does have pitting lower extremity edema. She denies lightheadedness, dizziness. She denies palpitations, syncope or near syncope. She does not complain of chest pain, pressure, discomfort. Allergies   Allergen Reactions    Ciprofloxacin Nausea And Vomiting    Codeine Itching     Creepy crawly \" feelings    Digoxin And Related Other (See Comments)     History of Digoxin toxicity         No outpatient medications have been marked as taking for the 1/3/23 encounter [facility] Encounter) with [facility] CHF ROOM 1.      Current Outpatient Medications on File Prior to Encounter   Medication Sig Dispense Refill    acetaminophen (TYLENOL) 325 MG tablet Take 650 mg by mouth every 4 hours as needed for Pain      apixaban (ELIQUIS) 5 MG TABS tablet Take 1 tablet by mouth 2 times daily 60 tablet 0    metoprolol succinate (TOPROL XL) 25 MG extended release tablet Take 1 tablet by mouth daily 30 tablet 0    bumetanide (BUMEX) 2 MG tablet Take 1 tablet by mouth daily 30 tablet 0    midodrine (PROAMATINE) 5 MG tablet Take 1 tablet by mouth 3 times daily (with meals) 90 tablet 0    LORazepam (ATIVAN) 0.5 MG tablet Take 0.5 mg by mouth nightly. insulin lispro (HUMALOG) 100 UNIT/ML injection vial Inject into the skin 3 times daily (before meals) Sliding scale      sertraline (ZOLOFT) 50 MG tablet Take 50 mg by mouth daily      loperamide (IMODIUM) 2 MG capsule Take 2 mg by mouth 4 times daily as needed for Diarrhea      OXYGEN Inhale 3 L into the lungs continuous      docusate sodium (COLACE, DULCOLAX) 100 MG CAPS Take 100 mg by mouth 2 times daily as needed for Constipation      insulin glargine (LANTUS) 100 UNIT/ML injection vial Inject 13 Units into the skin 2 times daily 10 mL 0    atorvastatin (LIPITOR) 20 MG tablet Take 20 mg by mouth nightly      benzoin compound solution Apply 1 Applicatorful topically nightly Apply topically to right toe (Patient not taking: No sig reported)      ipratropium-albuterol (DUONEB) 0.5-2.5 (3) MG/3ML SOLN nebulizer solution Inhale 1 vial into the lungs 4 times daily      miconazole (MICOTIN) 2 % powder Apply 1 each topically 2 times daily Apply topically under breasts twice daily      pantoprazole (PROTONIX) 40 MG tablet Take 40 mg by mouth every morning      mirtazapine (REMERON) 15 MG tablet Take 7.5 mg by mouth nightly      budesonide-formoterol (SYMBICORT) 160-4.5 MCG/ACT AERO Inhale 2 puffs into the lungs 2 times daily      [DISCONTINUED] metoprolol tartrate (LOPRESSOR) 25 MG tablet Take 0.5 tablets by mouth in the morning and 0.5 tablets before bedtime. 60 tablet 3    carbidopa-levodopa (SINEMET CR)  MG per extended release tablet Take 2 tablets by mouth in the morning and 2 tablets at noon and 2 tablets in the evening. rOPINIRole (REQUIP) 1 MG tablet Take 1 tablet by mouth in the morning and 1 tablet at noon and 1 tablet before bedtime. 90 tablet 3    [DISCONTINUED] amiodarone (CORDARONE) 200 MG tablet Take 1 tablet by mouth in the morning.       [DISCONTINUED] budesonide (PULMICORT) 0.5 MG/2ML nebulizer suspension Take 2 mLs by nebulization in the morning and 2 mLs in the evening. 60 each 3    [DISCONTINUED] insulin glargine-yfgn (SEMGLEE-YFGN) 100 UNIT/ML injection vial Inject 5 Units into the skin nightly      [DISCONTINUED] QUEtiapine (SEROQUEL) 25 MG tablet Take 1 tablet by mouth in the morning and 1 tablet before bedtime. 60 tablet 3    sucralfate (CARAFATE) 1 GM tablet Take 1 tablet by mouth in the morning and 1 tablet at noon and 1 tablet in the evening and 1 tablet before bedtime. 120 tablet 1    [DISCONTINUED] divalproex (DEPAKOTE) 250 MG DR tablet Take 250 mg by mouth 2 times daily      [DISCONTINUED] ferrous sulfate (IRON 325) 325 (65 Fe) MG tablet Take 325 mg by mouth daily (with breakfast) (Patient not taking: Reported on 7/7/2022)      aspirin EC 81 MG EC tablet Take 1 tablet by mouth daily 30 tablet 0    montelukast (SINGULAIR) 10 MG tablet Take 10 mg by mouth nightly       No current facility-administered medications on file prior to encounter. Guideline directed medical:  ARNI/ACE I/ARB: No  Beta blocker:  Yes metoprolol succinate 100 mg BID  Aldosterone antagonist:  No  SGLT2i:  No    ----1/3/2022 PATIENT DID NOT COME TO APPT WITH MEDICATION LIST. AWAITING FOR FACILITY TO FAX IT OVER.     Physical Examination:     BP (!) 113/55   Pulse 96   Resp 20   Wt 249 lb (112.9 kg)   SpO2 99%   BMI 48.63 kg/m²     Assessment  Charting Type: Shift assessment    Neurological  Level of Consciousness: Alert (0)  Orientation Level: Oriented X4  Cognition: Appropriate safety awareness, Follows commands, Appropriate attention/concentration, Appropriate for developmental age  Speech: Clear, Appropriate for developmental age  RUE Sensation : Full sensation  LUE Sensation : Full sensation  RLE Sensation : Full sensation  LLE Sensation : Full sensation    HEENT (Head, Ears, Eyes, Nose, & Throat)  HEENT (WDL): Exceptions to WDL  Right Eye: Glasses, Impaired vision  Left Eye: Glasses, Impaired vision  Teeth: Dentures upper, Dentures lower    Respiratory  Respiratory Pattern: Regular  Respiratory Depth: Normal  Respiratory Quality/Effort: Dyspnea with exertion  Chest Assessment: Chest expansion symmetrical  L Breath Sounds: Diminished  R Breath Sounds: Diminished      Cough/Sputum  Cough: Dry  Sputum Amount: None    Cardiac  Cardiac Regularity: Irregular  Cardiac Rhythm: Atrial fib    Rhythm Interpretation  Heart Rate: 96    Gastrointestinal  Abdominal (WDL): Exceptions to WDL  Abdomen Inspection: Obese, Distended  GI Symptoms: Bloating, Distention          Peripheral Vascular  Peripheral Vascular (WDL): Exceptions to WDL  Edema: Right lower extremity, Left lower extremity, Generalized  RUE Edema: Trace  LUE Edema: Trace  RLE Edema: +2, Pitting  LLE Edema: Weeping, +2, Pitting           Psychosocial  Psychosocial (WDL): Within Defined Limits    Cough Description  Sputum Amount: None    Heart Rate: 96        LAB DATA:    Last 3 BMP      Sodium (mmol/L)   Date Value   01/03/2023 144   12/30/2022 145   12/23/2022 146     Potassium (mmol/L)   Date Value   01/03/2023 3.5   12/30/2022 4.2     Potassium reflex Magnesium (mmol/L)   Date Value   12/23/2022 4.2   12/22/2022 4.3   12/21/2022 4.5     Chloride (mmol/L)   Date Value   01/03/2023 94 (L)   12/30/2022 96 (L)   12/23/2022 102     CO2 (mmol/L)   Date Value   01/03/2023 39 (H)   12/30/2022 44 (HH)   12/23/2022 37 (H)     BUN (mg/dL)   Date Value   01/03/2023 25 (H)   12/30/2022 31 (H)   12/23/2022 36 (H)     Glucose (mg/dL)   Date Value   01/03/2023 161 (H)   12/30/2022 158 (H)   12/23/2022 78     Calcium (mg/dL)   Date Value   01/03/2023 9.2   12/30/2022 8.7   12/23/2022 8.7       Last 3 BNP       Pro-BNP (pg/mL)   Date Value   01/03/2023 10,215 (H)   12/30/2022 16,518 (H)   12/19/2022 15,566 (H)          CBC: No results for input(s): WBC, HGB, PLT in the last 72 hours.   BMP:    Recent Labs     01/03/23  0926      K 3.5   CL 94*   CO2 39*   BUN 25*   CREATININE 1.3*   GLUCOSE 161*     Hepatic: No results for input(s): AST, ALT, ALB, BILITOT, ALKPHOS in the last 72 hours. Troponin: No results for input(s): TROPONINI in the last 72 hours. BNP: No results for input(s): BNP in the last 72 hours. Lipids: No results for input(s): CHOL, HDL in the last 72 hours. Invalid input(s): LDLCALCU  INR: No results for input(s): INR in the last 72 hours. WEIGHTS:    Wt Readings from Last 3 Encounters:   01/03/23 249 lb (112.9 kg)   12/30/22 247 lb (112 kg)   12/22/22 261 lb (118.4 kg)     TELEMETRY:  Cardiac Regularity: Irregular  Cardiac Rhythm/Interpretation: AFib     ASSESSMENT:  Mona Kinney presents for CHF clinic follow up after being in ED on 12/30/2022. Patient was not admitted at that time. Patient seen today with pitting lower extremity edema (2+ in BLE). Left leg weeping at this time. Patient also complains of SOB and distention. Agreeable to IVP diuretic. Patient has an appointment with Arnulof VINCENT in chf clinic. Interventions completed this visit:  IV diuretics given yes, IV Bumex 2mg once  Lab work obtained yes, pro-BNP BMP   Reviewed currently prescribed medications with patient, educated on importance of compliance and answered any questions regarding their medication  Educated on signs and symptoms of HF  Educated on low sodium diet    PLAN:  Scheduled to follow up in CHF clinic on   Future Appointments   Date Time Provider Prateek Nassar   1/13/2023  9:00 AM [facility] - [address]   1/19/2023  9:00 AM [facility] CHF ROOM [address]     Given clinic phone number and aware of signs and symptoms to call with any HF change in symptoms.

## 2023-01-13 ENCOUNTER — HOSPITAL ENCOUNTER (OUTPATIENT)
Dept: OTHER | Age: 73
Setting detail: THERAPIES SERIES
Discharge: HOME | End: 2023-01-13
Payer: MEDICARE

## 2023-01-13 ENCOUNTER — TELEPHONE (OUTPATIENT)
Dept: OTHER | Age: 73
End: 2023-01-13

## 2023-01-13 VITALS
HEIGHT: 60 IN | BODY MASS INDEX: 50.26 KG/M2 | SYSTOLIC BLOOD PRESSURE: 106 MMHG | OXYGEN SATURATION: 95 % | DIASTOLIC BLOOD PRESSURE: 56 MMHG | RESPIRATION RATE: 16 BRPM | HEART RATE: 120 BPM | WEIGHT: 256 LBS

## 2023-01-13 LAB
ANION GAP SERPL CALCULATED.3IONS-SCNC: 10 MMOL/L (ref 7–16)
BUN BLDV-MCNC: 30 MG/DL (ref 6–23)
CALCIUM SERPL-MCNC: 9 MG/DL (ref 8.6–10.2)
CHLORIDE BLD-SCNC: 94 MMOL/L (ref 98–107)
CO2: 37 MMOL/L (ref 22–29)
CREAT SERPL-MCNC: 1.8 MG/DL (ref 0.5–1)
GFR SERPL CREATININE-BSD FRML MDRD: 29 ML/MIN/1.73
GLUCOSE BLD-MCNC: 181 MG/DL (ref 74–99)
POTASSIUM SERPL-SCNC: 4.4 MMOL/L (ref 3.5–5)
PRO-BNP: ABNORMAL PG/ML (ref 0–125)
SODIUM BLD-SCNC: 141 MMOL/L (ref 132–146)

## 2023-01-13 PROCEDURE — 2500000003 HC RX 250 WO HCPCS: Performed by: NURSE PRACTITIONER

## 2023-01-13 PROCEDURE — 2580000003 HC RX 258: Performed by: NURSE PRACTITIONER

## 2023-01-13 PROCEDURE — 36415 COLL VENOUS BLD VENIPUNCTURE: CPT

## 2023-01-13 PROCEDURE — 99214 OFFICE O/P EST MOD 30 MIN: CPT | Performed by: NURSE PRACTITIONER

## 2023-01-13 PROCEDURE — 80048 BASIC METABOLIC PNL TOTAL CA: CPT

## 2023-01-13 PROCEDURE — 83880 ASSAY OF NATRIURETIC PEPTIDE: CPT

## 2023-01-13 PROCEDURE — 96374 THER/PROPH/DIAG INJ IV PUSH: CPT

## 2023-01-13 RX ORDER — SODIUM CHLORIDE 0.9 % (FLUSH) 0.9 %
5-40 SYRINGE (ML) INJECTION ONCE
Status: COMPLETED | OUTPATIENT
Start: 2023-01-13 | End: 2023-01-13

## 2023-01-13 RX ORDER — HYDROXYZINE PAMOATE 25 MG/1
25 CAPSULE ORAL 3 TIMES DAILY PRN
COMMUNITY

## 2023-01-13 RX ORDER — BUMETANIDE 0.25 MG/ML
2 INJECTION, SOLUTION INTRAMUSCULAR; INTRAVENOUS ONCE
Status: COMPLETED | OUTPATIENT
Start: 2023-01-13 | End: 2023-01-13

## 2023-01-13 RX ORDER — METOPROLOL SUCCINATE 25 MG/1
25 TABLET, EXTENDED RELEASE ORAL 2 TIMES DAILY
Qty: 60 TABLET | Refills: 3 | Status: SHIPPED | OUTPATIENT
Start: 2023-01-13

## 2023-01-13 RX ADMIN — Medication 10 ML: at 12:10

## 2023-01-13 RX ADMIN — BUMETANIDE 2 MG: 0.25 INJECTION, SOLUTION INTRAMUSCULAR; INTRAVENOUS at 12:10

## 2023-01-13 NOTE — TELEPHONE ENCOUNTER
3:27 PM Medication change by Terrie VINCENT faxed to facility ([phone]) at this time. Spoke with patient's nurse Dulce.      Joe Payne RN

## 2023-01-13 NOTE — PROGRESS NOTES
[facility] [address] [address] CHF Clinic   [facility] Clinic Visit         Reason for Visit: Heart failure    Primary Cardiologist: Dr. Barrett Reid       History of Present Illness:     Ms. Alexys Coelho is a 67year old female with a PMHx of chronic HFpEF, CAD, PAF, CKD, hypotension, HLD, T2DM, obesity, SERENA. She has had multiple heart failure hospitalizations over the past several months with recurrent atrial fibrillation with RVR. She presents today in follow-up from SNF facility. She has ongoing lower extremity edema extending up into her thighs and abdomen. She has chronic MORGAN, orthopnea and PND. She intermittently wears her CPAP, stated yesterday it was \"not working\". She denies any dizziness, lightheadedness, near-syncope, syncope, strokelike symptoms, early satiety, chest pain, pressure, heaviness, palpitations. She is currently sitting up in a wheelchair, easily falls asleep however awakes and answers questions appropriately.       Patient Active Problem List    Diagnosis Date Noted    Anemia 12/22/2022     Priority: Medium    Hypotension 12/20/2022     Priority: Medium    Heart failure ([address] 12/19/2022     Priority: Medium    Acute renal insufficiency 12/19/2022     Priority: Medium    Hyperkalemia 11/08/2022     Priority: Medium    Hypercapnic respiratory failure ([address] 11/06/2022     Priority: Medium    Acute on chronic respiratory failure ([address] 11/06/2022     Priority: Medium    Acute on chronic respiratory failure with hypercapnia ([address] 10/13/2022     Priority: Medium    Persistent atrial fibrillation ([address] 10/13/2022     Priority: Medium    COPD exacerbation ([address] 10/12/2022     Priority: Medium    Sepsis ([address] 10/05/2022     Priority: Medium    Metabolic alkalosis 86/20/5283     Priority: Medium    RVF (right ventricular failure) ([address] 08/28/2022     Priority: Medium    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[address] 08/26/2022     Priority: Medium    Puerperal sepsis with acute hypoxic respiratory failure without septic shock ([address] 08/26/2022     Priority: Medium    Pulmonary HTN ([address] 08/26/2022     Priority: Medium    Chronic anticoagulation 08/26/2022     Priority: Medium    Macrocytosis 08/25/2022     Priority: Medium    Other specified anemias 08/25/2022     Priority: Medium    Acute respiratory failure with hypoxia and hypercapnia (HCC) 08/24/2022     Priority: Medium    Acute confusion 08/24/2022     Priority: Medium    Acute on chronic diastolic heart failure ([address] 08/24/2022     Priority: Medium    Acute on chronic anemia 07/19/2022     Priority: Medium    Pleural effusion, right 07/19/2022     Priority: Medium    Thrombocytopenia ([address] 07/15/2022     Priority: Medium    Encephalopathy 07/15/2022     Priority: Medium    Acute respiratory failure with hypoxia ([address] 07/15/2022     Priority: Medium    Lactic acidosis 07/15/2022     Priority: Medium    Delirium 07/15/2022     Priority: Medium    Pancytopenia ([address]      Priority: Medium    Seizure-like activity ([address] 07/06/2022     Priority: Medium    Acute kidney injury [facility])      Priority: Medium    Septic shock ([address] 07/05/2022     Priority: Medium    Recurrent syncope 05/17/2022     Priority: Medium    Acute diastolic (congestive) heart failure ([address]      Priority: Medium    Nonrheumatic tricuspid valve regurgitation      Priority: Medium    Tobacco abuse      Priority: Medium    Acute decompensated heart failure ([address] 05/11/2022     Priority: Medium    Hepatosplenomegaly     Acute on chronic congestive heart failure ([address]     Coronary artery disease involving native coronary artery of native heart without angina pectoris     Dysphagia     Paroxysmal atrial fibrillation ([address] 04/14/2022    Atrial fibrillation with rapid ventricular response ([address] 04/14/2022    Acute asthma exacerbation 03/16/2022    Asthma exacerbation, mild 03/16/2022    Chest pain 01/09/2022    Inability to walk     Hypothyroidism     Generalized weakness 10/23/2021    RLS (restless legs syndrome) 08/11/2021 Primary hypertension 12/10/2019    Microalbuminuria 12/10/2019    Morbid obesity ([facility] 12/10/2019    Parkinson's disease ([facility] 09/19/2017    Diabetes mellitus [facility] 08/23/2016    Osteoporosis 01/21/2016     Updating deleted diagnoses      Asthma 01/21/2016    Hyperlipidemia 01/21/2016    Mitral regurgitation 01/21/2016    Obstructive sleep apnea syndrome 01/21/2016    Psoriasis 01/21/2016    Depression with anxiety 09/26/2014     Past Medical History:    CAD:  08/10/11 Lexiscan MPS [facility]):  Normal Lexiscan portion. No evidence for inducible ischemia. No previous myocardial infarction. Ejection fraction 77%. Lexiscan MPS 04/25/2014: Reversible inferolateral wall perfusion defect. Finding suggests left ventricular myocardium at risk for stress-induced ischemia. EF >70%. Cardiac Catheterization (Dr. Joaquin Calderon) 04/28/2014: Left main:  The left main artery is a short vessel which did not appear to have any significant angiographic disease. Left anterior descending: The left anterior descending artery is a moderate-sized vessel which has minor luminal irregularities in its middle segment but without any significant angiographic luminal narrowing. The diagonal branches also did not appear to have any significant disease. Left circumflex: The left circumflex is a large, codominant vessel which did not appear to have any significant angiographic disease. Right coronary artery:  The right coronary artery is a moderate-sized codominant vessel which did not appear to have any significant angiographic disease. LEFT VENTRICULOGRAPHY:  The left ventricle is normal in size and contractility with an estimated ejection fraction of 60% to 65%. There was no mitral regurgitation. RIGHT FEMORAL ARTERY ANGIOGRAPHY:  The right common femoral artery and the proximal segments of the right superficial femoral artery and the right profunda did not appear to have any significant disease.    Cardiac catheterization 1/11/2022: CONCLUSIONS:  Coronary artery disease: Left main. No significant disease. LAD. Heavily calcified proximal and mid vessel with 50% mid vessel stenosis. 60% proximal stenosis of a moderate size first diagonal branch. LCX. 50% ostial narrowing of the AV groove branch in the mid vessel which is a bifurcation lesion with a large marginal branch which itself did not appear to have any significant disease. The AV groove branch of the left circumflex continues to provide a posterior descending artery branch and the posterolateral branch (codominant vessel). RCA. Codominant vessel with no significant angiographic disease. Normal left ventricular size with hyperdynamic left ventricular systolic function with an estimated ejection fraction of 75%. Systemic hypertension. Elevated left ventricular end-diastolic pressure (18)  Nonischemic HFpEF:  TTE 04/27/2014 (Dr. Milli Bear): Normal left ventricle size and systolic function. No wall motion abnormalities. Mild concentric left ventricular hypertrophy. Ejection fraction is visually estimated at >55%. Normal right ventricular size and function. Aortic root is sclerotic and calcified  TTE 02/07/2021 (Dr. Jose Harper): Normal left ventricle size and systolic function. Ejection fraction is visually estimated at 65-70%. No regional wall motion abnormalities seen. Moderate concentric left ventricular hypertrophy. Normal right ventricular size and function. No systolic mitral regurgitation noted. No hemodynamically significant aortic stenosis is present. Unable to estimate PA systolic pressure. No evidence for hemodynamically significant pericardial effusion. Valves were not well visualized, please consider PAT if clinical suspicion for endocarditis is high  Echocardiogram 1/11/2022:  Left ventricle is normal in size. Moderate concentric left ventricular hypertrophy. No     regional wall motion abnormalities seen. Ejection fraction is visually estimated at 70+/-5%.   There is doppler evidence of stage I diastolic dysfunction. Mildly dilated right ventricle. Right ventricle global systolic function is normal ( TAPSE = 1.8 ). Normal size atria. No significant valvular abnormalities noted. PAT 4/18/22: Normal left ventricle size and systolic function. Ejection fraction is visually estimated at 60%. No regional wall motion abnormalities seen. Moderately dilated left atrium. No evidence of thrombus within left atrium or appendage. Agitated saline injected for shunt evaluation. No evidence of patent foramen ovale on bubble study. NAIF emptying velocity 18 cm/sec. Normal right ventricular size and function. Mild mitral regurgitation is present. No hemodynamically significant aortic stenosis is present. Mild tricuspid regurgitation. RVSP is 32 mmHg. Normal estimated PA systolic pressure. No evidence for hemodynamically significant pericardial effusion  TTE 7/07/2022: Technically difficult study - limited visualization. Micro-bubble contrast injected to enhance left ventricular visualization. Normal left ventricular size and systolic function. Ejection fraction is visually estimated at 70-75%. Indeterminate diastolic function. No regional wall motion abnormalities seen. Mild left ventricular concentric hypertrophy noted. Moderately dilated right ventricle with reduced function. Biatrial dilation. Mild tricuspid regurgitation. RVSP is at least 60 mmHg. Prominent pericardial fat pad. No definitive evidence of pericardial effusion. PAF: 4/2022 successful PAT/DCCV, successful DCCV 5/2022  Admission 7/2022 acute respiratory failure requiring intubation and hypotension treated with Levophed. Patient treated with Digibind for acute digoxin toxicity. Rapid AF started on amiodarone. Chest tube for right pleural effusion. Admission 8/2022: Rapid AF with hypoxic respiratory failure.   Thoracentesis 900 mL right-sided  Morbid obesity BMI 46  Diabetes  CKD  COPD  Hyperlipidemia  Hypertension  Asthma  SERENA with noncompliance to CPAP  Iron deficiency anemia  Depression  Parkinson's disease  Stomach ulcers  Anxiety  Breast cancer right-sided s/p right lumpectomy with radiation  Tonsillectomy/adenoidectomy, tubal ligation, right tube and ovary surgical removal, cholecystectomy, surgical removal of adhesions, excision of sebaceous cyst from abdomen, release of hammertoes bilaterally, lumbar laminectomy herniated L4-L5, bilateral breast reduction      Past Medical History:   Diagnosis Date    A-fib ([address]     Acute on chronic congestive heart failure (HCC)     Anxiety     Asthma     CAD (coronary artery disease) 01/21/2016    Cancer ([address]  breast ca 2006    right lumpectomy    Chronic kidney disease     nephrolithiasis    COPD exacerbation ([address] 10/12/2022    Depression     Diabetes mellitus ([address]     H/O mammogram     Hx MRSA infection     toe infection january 2012    Hyperlipidemia     Hypertension     Lateral epicondylitis     Morbid obesity ([address]     SERENA on CPAP     Oxygen dependent     Parkinson's disease ([address]     Tubal ligation status            Past Surgical History:   Procedure Laterality Date    BREAST LUMPECTOMY      BREAST REDUCTION SURGERY      CARDIAC CATHETERIZATION  4/28/2014    Dr. Dee Dee Arrington  01/11/2022    Dr. Rocky Botello  7/29/15    CT GUIDED CHEST TUBE  7/8/2022    CT GUIDED CHEST TUBE 7/8/2022 Dina Sharp MD [facility]    ENDOSCOPY, COLON, DIAGNOSTIC  7/19/15    GALLBLADDER SURGERY      LUMBAR LAMINECTOMY      TOE AMPUTATION      TONSILLECTOMY      UPPER GASTROINTESTINAL ENDOSCOPY      UPPER GASTROINTESTINAL ENDOSCOPY N/A 7/19/2022    EGD ESOPHAGOGASTRODUODENOSCOPY performed by Eric Mccormick MD at [facility] ENDOSCOPY             Allergies   Allergen Reactions    Ciprofloxacin Nausea And Vomiting    Codeine Itching     Creepy crawly \" feelings    Digoxin And Related Other (See Comments) History of Digoxin toxicity         Current Outpatient Medications:     BiPAP Machine MISC, by Does not apply route nightly, Disp: , Rfl:     glucagon, rDNA, 1 MG injection, Inject 1 mg into the muscle as needed for Low blood sugar, Disp: , Rfl:     Glucose (TRUEPLUS) 15 GM/32ML GEL, Take 15 g by mouth as needed, Disp: , Rfl:     hydrOXYzine pamoate (VISTARIL) 25 MG capsule, Take 25 mg by mouth 3 times daily as needed for Itching, Disp: , Rfl:     metoprolol succinate (TOPROL XL) 25 MG extended release tablet, Take 1 tablet by mouth 2 times daily, Disp: 60 tablet, Rfl: 3    acetaminophen (TYLENOL) 325 MG tablet, Take 650 mg by mouth every 4 hours as needed for Pain, Disp: , Rfl:     apixaban (ELIQUIS) 5 MG TABS tablet, Take 1 tablet by mouth 2 times daily, Disp: 60 tablet, Rfl: 0    bumetanide (BUMEX) 2 MG tablet, Take 1 tablet by mouth daily, Disp: 30 tablet, Rfl: 0    midodrine (PROAMATINE) 5 MG tablet, Take 1 tablet by mouth 3 times daily (with meals), Disp: 90 tablet, Rfl: 0    LORazepam (ATIVAN) 0.5 MG tablet, Take 0.5 mg by mouth nightly., Disp: , Rfl:     insulin lispro (HUMALOG) 100 UNIT/ML injection vial, Inject into the skin 3 times daily (before meals) Sliding scale, Disp: , Rfl:     sertraline (ZOLOFT) 50 MG tablet, Take 50 mg by mouth daily, Disp: , Rfl:     loperamide (IMODIUM) 2 MG capsule, Take 2 mg by mouth 4 times daily as needed for Diarrhea, Disp: , Rfl:     OXYGEN, Inhale 3 L into the lungs continuous, Disp: , Rfl:     docusate sodium (COLACE, DULCOLAX) 100 MG CAPS, Take 100 mg by mouth 2 times daily as needed for Constipation, Disp: , Rfl:     insulin glargine (LANTUS) 100 UNIT/ML injection vial, Inject 13 Units into the skin 2 times daily, Disp: 10 mL, Rfl: 0    atorvastatin (LIPITOR) 20 MG tablet, Take 20 mg by mouth nightly, Disp: , Rfl:     benzoin compound solution, Apply 1 Applicatorful topically nightly Apply topically to right toe (Patient not taking: No sig reported), Disp: , Rfl: ipratropium-albuterol (DUONEB) 0.5-2.5 (3) MG/3ML SOLN nebulizer solution, Inhale 1 vial into the lungs 4 times daily, Disp: , Rfl:     miconazole (MICOTIN) 2 % powder, Apply 1 each topically 2 times daily Apply topically under breasts twice daily, Disp: , Rfl:     pantoprazole (PROTONIX) 40 MG tablet, Take 40 mg by mouth every morning, Disp: , Rfl:     mirtazapine (REMERON) 15 MG tablet, Take 7.5 mg by mouth nightly, Disp: , Rfl:     budesonide-formoterol (SYMBICORT) 160-4.5 MCG/ACT AERO, Inhale 2 puffs into the lungs 2 times daily, Disp: , Rfl:     carbidopa-levodopa (SINEMET CR)  MG per extended release tablet, Take 2 tablets by mouth in the morning and 2 tablets at noon and 2 tablets in the evening., Disp: , Rfl:     rOPINIRole (REQUIP) 1 MG tablet, Take 1 tablet by mouth in the morning and 1 tablet at noon and 1 tablet before bedtime. , Disp: 90 tablet, Rfl: 3    sucralfate (CARAFATE) 1 GM tablet, Take 1 tablet by mouth in the morning and 1 tablet at noon and 1 tablet in the evening and 1 tablet before bedtime. , Disp: 120 tablet, Rfl: 1    aspirin EC 81 MG EC tablet, Take 1 tablet by mouth daily, Disp: 30 tablet, Rfl: 0    montelukast (SINGULAIR) 10 MG tablet, Take 10 mg by mouth nightly, Disp: , Rfl:     Current Facility-Administered Medications:     bumetanide (BUMEX) injection 2 mg, 2 mg, IntraVENous, Once, RASHARD Butler CNP    sodium chloride flush 0.9 % injection 5-40 mL, 5-40 mL, IntraVENous, Once, RASHARD Butler CNP       Review of Systems:   Cardiac: As per HPI  General: No fever, chills, rigors  Pulmonary: As per HPI  HEENT: No visual disturbances, difficult swallowing  GI: No nausea, vomiting, abdominal pain  : No dysuria or hematuria  Endocrine: No thyroid disease or diabetes  Musculoskeletal: HDZ x 4, no focal motor deficits  Skin: Intact, no rashes  Neuro/Psych: No headache or seizures      Weights:   Wt Readings from Last 3 Encounters:   01/13/23 256 lb (116.1 kg) 01/03/23 249 lb (112.9 kg)   12/30/22 247 lb (112 kg)       Physical Examination:     BP (!) 106/56   Pulse (!) 120 Comment: 106-136  Resp 16   Ht 5' (1.524 m)   Wt 256 lb (116.1 kg)   SpO2 95%   BMI 50.00 kg/m²     CONSTITUTIONAL: Alert and oriented times 3, no acute distress and cooperative to examination with proper mood and affect. SKIN: Skin color, texture, turgor normal. No rashes or lesions. LYMPH: no cervical nodes, no inguinal nodes  HEENT: Head is normocephalic, atraumatic. EOMI, PERRLA. NECK: Supple, symmetrical, trachea midline, no adenopathy, thyroid symmetric, not enlarged and no tenderness, skin normal.  CHEST/LUNGS: chest symmetric with normal A/P diameter, normal respiratory rate and rhythm, lungs diminished bases otherwise clear to auscultation without wheezes, rales or rhonchi. No accessory muscle use. Scars None   CARDIOVASCULAR: Heart sounds are normal.  Irregular rate and rhythm without murmur, gallop or rub. Normal S1 and S2. Carotid and femoral pulses 2+/4 and equal bilaterally. ABDOMEN: Morbidly Obese. No and Laparoscopic scar(s) present. Normal bowel sounds. No bruits. soft, nondistended, no masses or organomegaly. no evidence of hernia. Percussion: Normal without hepatosplenomegally. Tenderness: absent. RECTAL: deferred, not clinically indicated  NEUROLOGIC: There are no focalizing motor or sensory deficits. CN II-XII are grossly intact. Jestine Courts EXTREMITIES: no cyanosis, no clubbing. 3+ pitting edema extending into thighs. All the following diagnostics were personally reviewed and interpreted by me.        LAB DATA:     12/30/22 18:08 1/3/23 09:26   Sodium 145 144   Potassium 4.2 3.5   Chloride 96 (L) 94 (L)   CO2 44 (HH) 39 (H)   BUN,BUNPL 31 (H) 25 (H)   Creatinine 1.5 (H) 1.3 (H)   Anion Gap 5 (L) 11   Est, Glom Filt Rate 37 43   Magnesium 2.0    Glucose, Random 158 (H) 161 (H)   CALCIUM, SERUM, 768647 8.7 9.2   Total Protein 5.8 (L)    Pro-BNP 16,518 (H) 10,215 (H) Troponin, High Sensitivity 93 (H)    Albumin 3.6    Alk Phos 94    ALT <5    AST 11    Bilirubin 0.4    WBC 6.8    RBC 3.22 (L)    Hemoglobin Quant 8.4 (L)    Hematocrit 30.9 (L)    MCV 96.0    MCH 26.1    MCHC 27.2 (L)    MPV 10.0    RDW 17.2 (H)    Platelet Count 881        IMAGING:    CXR (12/30/2022)  FINDINGS:  The heart is enlarged. There is a right pleural effusion right lung base  atelectasis. I cannot exclude partial collapse of the right lower lobe. The  left lung is clear. There is no pneumothorax. Impression  1. Right pleural effusion right lung base atelectasis. 2. I cannot exclude partial collapse of the right lower lobe  3. Cardiomegaly      CT Chest (11/8/2022)  Impression  Bilateral pleural effusions large on the right and small on the left with  collapse of the right middle and lower lobe. Four-chamber cardiac  enlargement with enlargement of the pulmonary arteries to suggest pulmonary  arterial hypertension and small pericardial effusion. Findings raise a  concern for possible failure. CARDIAC TESTING:    PAT (4/18/2022)   Summary   Normal left ventricle size and systolic function. Ejection fraction is visually estimated at 60%. No regional wall motion abnormalities seen. Moderately dilated left atrium. No evidence of thrombus within left atrium or appendage. Agitated saline injected for shunt evaluation. No evidence of patent foramen ovale on bubble study. NAIF emptying velocity 18 cm/sec. Normal right ventricular size and function. Mild mitral regurgitation is present. No hemodynamically significant aortic stenosis is present. Mild tricuspid regurgitation. RVSP is 32 mmHg. Normal estimated PA systolic pressure. No evidence for hemodynamically significant pericardial effusion. TTE (7/7/2022)   Summary   Technically difficult study - limited visualization. Micro-bubble contrast injected to enhance left ventricular visualization.    Normal left ventricular size and systolic function. Ejection fraction is visually estimated at 70-75%. Indeterminate diastolic function. No regional wall motion abnormalities seen. Mild left ventricular concentric hypertrophy noted. Moderately dilated right ventricle with reduced function. Biatrial dilation. Mild tricuspid regurgitation. RVSP is at least 60 mmHg. Prominent pericardial fat pad. No definitive evidence of pericardial effusion. Telemetry  Atrial Fibrillation       ASSESSMENT:  Chronic HFpEF  ACC stage C / NYHA class III  Hypervolemic  CAD - Nonobstructive involving LAD and left circumflex artery  Persistent atrial fibrillation with multiple prior DCCV's - OAC Eliquis   Hypotension on midodrine   CKD  HTN  HLD  T2DM  COPD with prior tobacco abuse   Acute on chronic hypoxic/hypercapnic respiratory failure  SERENA - intermittently compliant   Morbid obesity       PLAN:  Get blood work today  Will give dose of IV bumex today  Increase Toprol to 25 mg twice daily   Elevate legs as much as possible - please apply ACE wraps to lower extremities (on in AM and off in PM). Wear CPAP nightly  Daily weights  Follow up with CHF clinic in 2-3 days  Please have dietician see patient - please make sure she is eating low sodium   Follow up with Dr. Diann Dowd in 3 months   Weigh yourself daily     -Stay Hydrated, 64 oz      -Diet should sodium restricted to 2 grams     -Again watch your daily weight trends and if you gain water weight please follow below instructions.     -If you gain 3-5 pounds in 2-3 days OR notice that you are retaining fluid in anyway just like you did before then take an extra dose of your water pill (bumetanide/Bumex)  every day until you lose the weight or feel better.      -If you notice that you have taken more than 2 extra doses in 1 week then please call and let us know.      -If at any time you feel that you are retaining fluid, your medications are not working, or you feel ill in anyway, then please call us for either same day appointment or the next day, and for instructions. Our goal is to keep you out of the emergency room and the hospital and we have ways to do it. You just need to call us in a timely manner.      -If you become sick for other reasons, and notice that you are not urinating as much, the urine is very dark, you have significant diarrhea or vomiting, then please DO NOT take your water pill and CALL US immediately.          Blu WETZEL-CNP  [facility] Cardiology

## 2023-01-13 NOTE — DISCHARGE INSTRUCTIONS
Get blood work today  Will give dose of IV bumex today  Increase Toprol to 25 mg twice daily   Elevate legs as much as possible - please apply ACE wraps to lower extremities (on in AM and off in PM). Wear CPAP nightly  Daily weights  Follow up with CHF clinic in 2-3 days  Please have dietician see patient - please make sure she is eating low sodium   Follow up with Dr. Barrett Reid in 3 months   Weigh yourself daily    -Stay Hydrated, 64 oz     -Diet should sodium restricted to 2 grams    -Again watch your daily weight trends and if you gain water weight please follow below instructions.    -If you gain 3-5 pounds in 2-3 days OR notice that you are retaining fluid in anyway just like you did before then take an extra dose of your water pill (bumetanide/Bumex)  every day until you lose the weight or feel better.     -If you notice that you have taken more than 2 extra doses in 1 week then please call and let us know. -If at any time you feel that you are retaining fluid, your medications are not working, or you feel ill in anyway, then please call us for either same day appointment or the next day, and for instructions. Our goal is to keep you out of the emergency room and the hospital and we have ways to do it. You just need to call us in a timely manner.     -If you become sick for other reasons, and notice that you are not urinating as much, the urine is very dark, you have significant diarrhea or vomiting, then please DO NOT take your water pill and CALL US immediately. Advance Directives: Care Instructions  Overview  An advance directive is a legal way to state your wishes at the end of your life. It tells your family and your doctor what to do if you can't say what you want. There are two main types of advance directives. You can change them any time your wishes change. Living will. This form tells your family and your doctor your wishes about life support and other treatment.  The form is also called a declaration. Medical power of . This form lets you name a person to make treatment decisions for you when you can't speak for yourself. This person is called a health care agent (health care proxy, health care surrogate). The form is also called a durable power of  for health care. If you do not have an advance directive, decisions about your medical care may be made by a family member, or by a doctor or a  who doesn't know you. It may help to think of an advance directive as a gift to the people who care for you. If you have one, they won't have to make tough decisions by themselves. For more information, including forms for your state, see the [address] website (www.caringinfo.org/planning/advance-directives/). Follow-up care is a key part of your treatment and safety. Be sure to make and go to all appointments, and call your doctor if you are having problems. It's also a good idea to know your test results and keep a list of the medicines you take. What should you include in an advance directive? Many states have a unique advance directive form. (It may ask you to address specific issues.) Or you might use a universal form that's approved by many states. If your form doesn't tell you what to address, it may be hard to know what to include in your advance directive. Use the questions below to help you get started. Who do you want to make decisions about your medical care if you are not able to? What life-support measures do you want if you have a serious illness that gets worse over time or can't be cured? What are you most afraid of that might happen? (Maybe you're afraid of having pain, losing your independence, or being kept alive by machines.)  Where would you prefer to die? (Your home? A hospital? A nursing home?)  Do you want to donate your organs when you die? Do you want certain Jainism practices performed before you die?   When should you call for help?  Be sure to contact your doctor if you have any questions. Where can you learn more? Go to http://www.kalidea.com/ and enter R264 to learn more about \"Advance Directives: Care Instructions. \"  Current as of: June 16, 2022               Content Version: 13.5  © 8655-4687 Healthwise, Incorporated. Care instructions adapted under license by [facility] [place] [facility]). If you have questions about a medical condition or this instruction, always ask your healthcare professional. [address] any warranty or liability for your use of this information. HEART FAILURE  / CONGESTIVE HEART FAILURE  DISCHARGE INSTRUCTIONS:  GUIDELINES TO FOLLOW AT HOME    Self- Managed Care:     MEDICATIONS:  Take your medication as directed. If you are experiencing any side effects, inform your doctor, Do not stop taking any of your medications without letting your doctor know. Check with your doctor before taking any over-the-counter medications / herbal / or dietary supplements. They may interfere with your other medications. Do not take ibuprofen (Advil or Motrin) and naproxen (Aleve) without talking to your doctor first. They could make your heart failure worse. WEIGHT MONITORING:   Weigh yourself everyday (with the same scale) around the same time of the day and write it down. (you can chart them on a calendar or keep track of them on paper. Notify your doctor of a weight gain of 3 pounds or more in 1 day   OR a total of 5 pounds or more in 1 week    Take your weight record to your doctor visits  Also, the same goes if you loose more than 3# in one day, let your heart doctor know. DIET:   Cardiac heart healthy diet- Low saturated / low trans fat, no added salt, caffeine restricted, Low sodium diet-   No more than 2,000mg (2 grams) of salt / sodium per day (which equals to a little less than  a teaspoon of salt)  If your doctor wants you on a fluid restriction. ..it is usually recommended a fluid limit of 2,000cc -  Fluid restriction- 2,000 ml (milliliters) = 64 ounces = you can have 8 glasses of fluid per day (each glass 8 ounces)    Follow a low salt diet - avoid using salt at the table, avoid / limit use of canned soups, processed / packaged foods, salted snacks, olives and pickles. Do not use a salt substitute without checking with your doctor, they may contain a high amount of potassioum. (Mrs. Abdiel Menendez is safe to use). Limit the use of alcohol       CALL YOUR DOCTOR THE FIRST DAY YOU NOTICE ANY OF THESE   SYMPTOMS:  You have a weight gain of 3 pounds or more in 1 day         OR 5 pounds or more in one week  More shortness of breath  More swelling of your stomach, legs, ankles or feet  Feeling more tired, No energy  Dry hacky cough  Dizziness  More chest pain / discomfort       (CALL 911 IF ANY OF THE FOLLOWING OCCURS  Chest pain (not relieved with nitroglycerine, if you have been prescribed this medication)  Severe shortness of breath  Faint / Pass out  Confusion / cannot think clearly  If symptoms get worse           SMOKING - TOBACCO USE:  * IF YOU SMOKE - STOP! Kick the habit. [address] Program is offered at [place] [id number] and [address] [facility]. Call [phone] extension [phone] for more information. ACTIVITY:   (Ask your doctor when you will be able to return to work and before starting any exercise program.  Do not drive unless unless your doctor has given you permission to do so). Start light exercise. Even if you can only do a small amount, exercise will help you get stronger, have more energy, help manage your weight and decrease  stress. Walking is an easy way to get exercise.  Start out slowly and  increase the amount you walk as tolerated  If you become short of breath, dizzy or have chest pain; stop, sit down, and rest.  If you feel \"wiped out\" the day after you exercise, walk at a slower pace or for a shorter distance. You can gradually increase the pace or amount of time. (Do not exercise right after a meal or in extreme temperatures, such as above 85 degrees, if the air is really humid, or wind chill is less than 20 degrees)                                             ADDITIONAL INFORMATION:  Avoid getting sick from colds and the flu. Stay away from friends or family that you know may have a contagious illness  Get plenty of rest   Get a flu shot each year. Get a pneumococcal vaccine shot. If you have had one before, ask your doctor whether you need another dose. My Goal for Self-management of Heart Failure Includes 5 steps :    1. Notice a change in symptoms ( weight gain, short of breath, leg swelling, decreased activity level, bloating. ...)    2. Evaluate the change: (use the Heart Failure Zones )     3. Decide to take action: decide what your options are, such as: (call your doctor for an extra visit, take a prescribed medication, such as your water pill if your doctor has given you directions to do so, [address])    4. Come up with a strategy:  (now you call the doctor for advice / appointment. This is where you take action!!! Do not wait, catch the symptom early and treat it before it worsens. 5. Evaluate the response: The next day, check your Heart Failure Zones: are you in the GREEN ZONE (safe zone)? Worsening symptoms of YELLOW ZONE? Or have you moved to the RED ZONE and need to call 911 or go to the Emergency Room for evaluation? Call your doctor's office to update them on your symptoms of heart failure. Advance Care Planning for Heart Failure: Care Instructions  Your Care Instructions     If you have heart failure, taking care of yourself will help you feel better and live longer. But the disease often gets worse over time. So it may be a good idea to plan for the future now while you are active and able to communicate your wishes.   If you do this kind of planning, it does not mean that you are giving up. It's simply the best way to make sure that you get the care and treatment that you want. It can also make things much easier for your loved ones. What can you do to plan for the end of life? Talk openly and honestly with your family and doctor. This is the best way to understand the decisions you will need to make as your health changes. Know that you can always change your mind. Ask your doctor about common life-support treatments. These include tube feedings, breathing machines, and fluids given through a vein (IV). It may be easier to decide if you want them after you are sure you understand what they are and how they may help you. Think about preparing written papers that state your wishes. These papers are called advance directives. If you do this early and review them often, there will not be any confusion about your wishes. You can change your instructions at any time. If you are near the end of your life and you have a heart device such as an implantable cardioverter defibrillator (ICD) or pacemaker, talk to your doctor about turning it off. Include this in your advance directive. Ask a friend or family member to make decisions for you when you no longer can. Choose someone who knows you well and understands what makes life meaningful for you. Talk to this person about the kinds of treatments you want or don't want. Make sure this person understands your values and wishes. You may decide to try life-supporting treatments for a limited time. This can help your doctor see if they will help. You may also decide that you want your doctor to do only certain things to keep you alive. It may help to think about the big picture, like what makes life worth living for you or what your values and goals are. Ask your doctor for an estimate of how long you may live. Your doctor may not always know.  But your doctor can tell you what usually happens with heart failure. Which papers should you prepare? If you have heart failure, you may find it hard to think about a time when you might not be able to care for yourself. But since heart failure tends to get worse over time, it's important to make a plan for the care you want later. An advance directive can help you do this. Advance directives are legal papers that tell doctors how to care for you at the end of your life. They may include a living will and a durable power of . You don't need a  to write these papers. If you prepare these papers, be sure to give a copy to your doctor. It's also important to give a copy to a family member or close friend. Consider a do-not-resuscitate order (DNR). This order asks that no extra treatments be done if your heart stops or you stop breathing. Extra treatments may include cardiopulmonary resuscitation (CPR), electrical shock to restart your heart, or a machine to breathe for you. If you decide to have a DNR order, ask your doctor to explain and write it. Place the order in your home where everyone can easily see it. Think about a living will. It explains your wishes in case you are in a coma or can't communicate. Living zhu tell doctors to use or not use treatments to keep you alive. You must have one or two witnesses or a notary in the room when you sign this form. Think about naming a person to make decisions about your care if you aren't able to. This paper is called a durable power of . Most people ask a close friend or family member. Ask your doctor or your state health department about advance directives. They may have forms for each of these types of papers. All of these papers are simple to change. Tell your doctor what you want to change. Ask him or her to make a note in your file. Then give your family updated copies. Where can you learn more?   Go to http://www.woods.com/ and enter L823 to learn more about \"Advance Care Planning for Heart Failure: Care Instructions. \"  Current as of: October 6, 2021               Content Version: 13.5  © 2006-2022 [employer]. Care instructions adapted under license by [facility] ([facility]). If you have questions about a medical condition or this instruction, always ask your healthcare professional. [address] any warranty or liability for your use of this information. Diet and Exercise for Metabolic Syndrome: Care Instructions  Your Care Instructions     Metabolic syndrome is the name for a group of health problems. It includes having too much fat around your waist and high blood pressure. It also includes high triglycerides, high blood sugar, and low levels of healthy (HDL) cholesterol. These problems make it more likely you will have a heart attack or stroke or get diabetes. Your family history (your genes) can cause metabolic syndrome. So can unhealthy eating habits and not getting enough exercise. You can help lower your risk of heart attack, stroke, and diabetes if you eat healthy foods and get more exercise. It may be hard to make these lifestyle changes. But even small changes can help. Follow-up care is a key part of your treatment and safety. Be sure to make and go to all appointments, and call your doctor if you are having problems. It's also a good idea to know your test results and keep a list of the medicines you take. How can you care for yourself at home? Eat more fruits and vegetables  Fruits and vegetables have nutrients to help protect you from heart disease and high blood pressure. They are low in fat and high in fiber. Dark green, orange, and yellow ones are the healthiest.  Keep lots of vegetables ready for snacks. Buy fruit that is in season. Then put it where you can see it so you will want to eat it. Cook dishes that have a lot of vegetables. Soups and stir-fries are good choices.   Limit saturated fats  Read food labels, and try to avoid saturated fats. They increase your risk of heart disease. Use olive or canola oil when you cook. Bake, broil, grill, or steam foods. Avoid fried foods. Limit how much high-fat meat you eat. This includes hot dogs and sausages. When you prepare meat, cut off all the fat. You can replace high-fat meat with fish and skinless poultry. You can also try products made from soybeans, like tofu. Soybeans may be very good for your heart. Choose low-fat or fat-free milk and dairy products. Eat foods high in fiber  Foods high in fiber may reduce your cholesterol. And they may give you important vitamins and minerals. Good examples are oatmeal, cooked dried beans, brown rice, citrus fruits, and apples. Eat whole-grain breads and cereals. They have more fiber than white bread or pastries. Limit high-sugar foods  Limit foods and drinks that are high in sugar. Some examples are soda pop, sugar-sweetened fruit drinks, candy, and many desserts. Limit the amount of sugar, honey, and other sweeteners that you add to food and drinks. Choose water instead of soda pop or other sugar-sweetened drinks. Limit fruit juice. Limit salt and sodium  You can help lower your blood pressure if you limit salt and sodium. If you take the salt shaker off the table, it may be easier to use less salt. You can also try using half the salt in a recipe. And you can avoid adding salt to cooking water for pasta, rice, and potatoes. Try to eat fewer snacks, fast foods, and other high-salt, processed foods. Check labels so you know how much sodium a food has. Choose canned goods (soups, vegetables, and beans) that are low in sodium. Get regular exercise  Get more exercise. Make sure your doctor knows when you start a new exercise program. Even small amounts of exercise will help you get stronger and have more energy. It can also help you manage your weight and your stress. Walking is a good choice.  Little by little, increase the amount you walk every day. Try for at least 30 minutes on most days of the week. Where can you learn more? Go to http://www.woods.com/ and enter H930 to learn more about \"Diet and Exercise for Metabolic Syndrome: Care Instructions. \"  Current as of: April 13, 2022               Content Version: 13.5  © 2006-2022 [employer]. Care instructions adapted under license by [facility] ([facility]). If you have questions about a medical condition or this instruction, always ask your healthcare professional. [address] any warranty or liability for your use of this information. Preventing Falls: Care Instructions  Overview     Getting around your home safely can be a challenge if you have injuries or health problems that make it easy for you to fall. Loose rugs and furniture in walkways are among the dangers for many older people who have problems walking or who have poor eyesight. People who have conditions such as arthritis, osteoporosis, or dementia also have to be careful not to fall. You can make your home safer with a few simple measures. Follow-up care is a key part of your treatment and safety. Be sure to make and go to all appointments, and call your doctor if you are having problems. It's also a good idea to know your test results and keep a list of the medicines you take. How can you care for yourself at home? Taking care of yourself  Exercise regularly to improve your strength, muscle tone, and balance. Walk if you can. Swimming may be a good choice if you cannot walk easily. Have your vision and hearing checked each year or any time you notice a change. If you have trouble seeing and hearing, you might not be able to avoid objects and could lose your balance. Know the side effects of the medicines you take. Ask your doctor or pharmacist whether the medicines you take can affect your balance. Sleeping pills or sedatives can affect your balance.   Limit the amount of alcohol you drink. Alcohol can impair your balance and other senses. Ask your doctor whether calluses or corns on your feet need to be removed. If you wear loose-fitting shoes because of calluses or corns, you can lose your balance and fall. Talk to your doctor if you have numbness in your feet. You may get dizzy if you do not drink enough water. To prevent dehydration, drink plenty of fluids. Choose water and other clear liquids. If you have kidney, heart, or liver disease and have to limit fluids, talk with your doctor before you increase the amount of fluids you drink. Preventing falls at home  Remove raised doorway thresholds, throw rugs, and clutter. Repair loose carpet or raised areas in the floor. Move furniture and electrical cords to keep them out of walking paths. Use nonskid floor wax, and wipe up spills right away, especially on ceramic tile floors. If you use a walker or cane, put rubber tips on it. If you use crutches, clean the bottoms of them regularly with an abrasive pad, such as steel wool. Keep your house well lit, especially stairways, porches, and outside walkways. Use night-lights in areas such as hallways and bathrooms. Add extra light switches or use remote switches (such as switches that go on or off when you clap your hands) to make it easier to turn lights on if you have to get up during the night. Install sturdy handrails on stairways. Move items in your cabinets so that the things you use a lot are on the lower shelves (about waist level). Keep a cordless phone and a flashlight with new batteries by your bed. If possible, put a phone in each of the main rooms of your house, or carry a cell phone in case you fall and cannot reach a phone. Or, you can wear a device around your neck or wrist. You push a button that sends a signal for help. Wear low-heeled shoes that fit well and give your feet good support. Use footwear with nonskid soles.  Check the heels and soles of your shoes for wear. Repair or replace worn heels or soles. Do not wear socks without shoes on smooth floors, such as wood. Walk on the grass when the sidewalks are slippery. If you live in an area that gets snow and ice in the winter, sprinkle salt on slippery steps and sidewalks. Or ask a family member or friend to do this for you. Preventing falls in the bath  Install grab bars and nonskid mats inside and outside your shower or tub and near the toilet and sinks. Use shower chairs and bath benches. Use a hand-held shower head that will allow you to sit while showering. Get into a tub or shower by putting the weaker leg in first. Get out of a tub or shower with your strong side first.  Repair loose toilet seats and consider installing a raised toilet seat to make getting on and off the toilet easier. Keep your bathroom door unlocked while you are in the shower. Where can you learn more? Go to http://www.ardon.com/ and enter G117 to learn more about \"Preventing Falls: Care Instructions. \"  Current as of: May 4, 2022               Content Version: 13.5  © 2006-2022 Healthwise, Incorporated. Care instructions adapted under license by [facility] ([facility]). If you have questions about a medical condition or this instruction, always ask your healthcare professional. [place] [address] any warranty or liability for your use of this information.

## 2023-01-16 ENCOUNTER — HOSPITAL ENCOUNTER (OUTPATIENT)
Dept: OTHER | Age: 73
Setting detail: THERAPIES SERIES
Discharge: HOME | End: 2023-01-16
Payer: MEDICARE

## 2023-01-16 ENCOUNTER — APPOINTMENT (OUTPATIENT)
Dept: GENERAL RADIOLOGY | Age: 73
End: 2023-01-16
Payer: MEDICARE

## 2023-01-16 ENCOUNTER — HOSPITAL ENCOUNTER (EMERGENCY)
Age: 73
Discharge: HOME | End: 2023-01-16
Attending: EMERGENCY MEDICINE
Payer: MEDICARE

## 2023-01-16 ENCOUNTER — TELEPHONE (OUTPATIENT)
Dept: OTHER | Facility: CLINIC | Age: 73
End: 2023-01-16

## 2023-01-16 VITALS
HEIGHT: 60 IN | OXYGEN SATURATION: 99 % | HEART RATE: 112 BPM | RESPIRATION RATE: 16 BRPM | WEIGHT: 259 LBS | BODY MASS INDEX: 50.85 KG/M2 | SYSTOLIC BLOOD PRESSURE: 70 MMHG

## 2023-01-16 VITALS
SYSTOLIC BLOOD PRESSURE: 122 MMHG | HEART RATE: 77 BPM | WEIGHT: 259 LBS | BODY MASS INDEX: 50.58 KG/M2 | OXYGEN SATURATION: 99 % | TEMPERATURE: 98.1 F | DIASTOLIC BLOOD PRESSURE: 63 MMHG | RESPIRATION RATE: 20 BRPM

## 2023-01-16 DIAGNOSIS — I50.9 CHRONIC CONGESTIVE HEART FAILURE, UNSPECIFIED HEART FAILURE TYPE (HCC): Primary | ICD-10-CM

## 2023-01-16 DIAGNOSIS — S91.101A OPEN WOUND OF GREAT TOE, RIGHT, INITIAL ENCOUNTER: ICD-10-CM

## 2023-01-16 LAB
ALBUMIN SERPL-MCNC: 3.8 G/DL (ref 3.5–5.2)
ALP BLD-CCNC: 96 U/L (ref 35–104)
ALT SERPL-CCNC: <5 U/L (ref 0–32)
ANION GAP SERPL CALCULATED.3IONS-SCNC: 8 MMOL/L (ref 7–16)
ANION GAP SERPL CALCULATED.3IONS-SCNC: 9 MMOL/L (ref 7–16)
ANISOCYTOSIS: ABNORMAL
AST SERPL-CCNC: 10 U/L (ref 0–31)
BASOPHILS ABSOLUTE: 0.15 E9/L (ref 0–0.2)
BASOPHILS RELATIVE PERCENT: 1.7 % (ref 0–2)
BILIRUB SERPL-MCNC: 0.4 MG/DL (ref 0–1.2)
BUN BLDV-MCNC: 38 MG/DL (ref 6–23)
BUN BLDV-MCNC: 39 MG/DL (ref 6–23)
CALCIUM SERPL-MCNC: 9 MG/DL (ref 8.6–10.2)
CALCIUM SERPL-MCNC: 9.1 MG/DL (ref 8.6–10.2)
CHLORIDE BLD-SCNC: 93 MMOL/L (ref 98–107)
CHLORIDE BLD-SCNC: 93 MMOL/L (ref 98–107)
CO2: 38 MMOL/L (ref 22–29)
CO2: 39 MMOL/L (ref 22–29)
CREAT SERPL-MCNC: 2.1 MG/DL (ref 0.5–1)
CREAT SERPL-MCNC: 2.1 MG/DL (ref 0.5–1)
EOSINOPHILS ABSOLUTE: 0.08 E9/L (ref 0.05–0.5)
EOSINOPHILS RELATIVE PERCENT: 0.9 % (ref 0–6)
GFR SERPL CREATININE-BSD FRML MDRD: 24 ML/MIN/1.73
GFR SERPL CREATININE-BSD FRML MDRD: 24 ML/MIN/1.73
GLUCOSE BLD-MCNC: 78 MG/DL (ref 74–99)
GLUCOSE BLD-MCNC: 79 MG/DL (ref 74–99)
HCT VFR BLD CALC: 32.6 % (ref 34–48)
HEMOGLOBIN: 8.7 G/DL (ref 11.5–15.5)
HYPOCHROMIA: ABNORMAL
LYMPHOCYTES ABSOLUTE: 0.71 E9/L (ref 1.5–4)
LYMPHOCYTES RELATIVE PERCENT: 7.8 % (ref 20–42)
MCH RBC QN AUTO: 25.4 PG (ref 26–35)
MCHC RBC AUTO-ENTMCNC: 26.7 % (ref 32–34.5)
MCV RBC AUTO: 95 FL (ref 80–99.9)
MONOCYTES ABSOLUTE: 0.53 E9/L (ref 0.1–0.95)
MONOCYTES RELATIVE PERCENT: 6.1 % (ref 2–12)
MYELOCYTE PERCENT: 1.7 % (ref 0–0)
NEUTROPHILS ABSOLUTE: 7.39 E9/L (ref 1.8–7.3)
NEUTROPHILS RELATIVE PERCENT: 81.7 % (ref 43–80)
NUCLEATED RED BLOOD CELLS: 0.9 /100 WBC
OVALOCYTES: ABNORMAL
PDW BLD-RTO: 16.5 FL (ref 11.5–15)
PLATELET # BLD: 244 E9/L (ref 130–450)
PMV BLD AUTO: 10.7 FL (ref 7–12)
POIKILOCYTES: ABNORMAL
POLYCHROMASIA: ABNORMAL
POTASSIUM REFLEX MAGNESIUM: 4.5 MMOL/L (ref 3.5–5)
POTASSIUM SERPL-SCNC: 4.4 MMOL/L (ref 3.5–5)
PRO-BNP: ABNORMAL PG/ML (ref 0–125)
PRO-BNP: ABNORMAL PG/ML (ref 0–125)
RBC # BLD: 3.43 E12/L (ref 3.5–5.5)
SODIUM BLD-SCNC: 139 MMOL/L (ref 132–146)
SODIUM BLD-SCNC: 141 MMOL/L (ref 132–146)
STOMATOCYTES: ABNORMAL
TEAR DROP CELLS: ABNORMAL
TOTAL PROTEIN: 6.3 G/DL (ref 6.4–8.3)
TROPONIN, HIGH SENSITIVITY: 90 NG/L (ref 0–9)
TROPONIN, HIGH SENSITIVITY: 98 NG/L (ref 0–9)
WBC # BLD: 8.9 E9/L (ref 4.5–11.5)

## 2023-01-16 PROCEDURE — 36415 COLL VENOUS BLD VENIPUNCTURE: CPT

## 2023-01-16 PROCEDURE — 73630 X-RAY EXAM OF FOOT: CPT

## 2023-01-16 PROCEDURE — 71045 X-RAY EXAM CHEST 1 VIEW: CPT

## 2023-01-16 PROCEDURE — 84484 ASSAY OF TROPONIN QUANT: CPT

## 2023-01-16 PROCEDURE — 83880 ASSAY OF NATRIURETIC PEPTIDE: CPT

## 2023-01-16 PROCEDURE — 80053 COMPREHEN METABOLIC PANEL: CPT

## 2023-01-16 PROCEDURE — 85025 COMPLETE CBC W/AUTO DIFF WBC: CPT

## 2023-01-16 PROCEDURE — 99215 OFFICE O/P EST HI 40 MIN: CPT

## 2023-01-16 PROCEDURE — 6360000002 HC RX W HCPCS: Performed by: STUDENT IN AN ORGANIZED HEALTH CARE EDUCATION/TRAINING PROGRAM

## 2023-01-16 PROCEDURE — 80048 BASIC METABOLIC PNL TOTAL CA: CPT

## 2023-01-16 RX ORDER — CEPHALEXIN 500 MG/1
500 CAPSULE ORAL 4 TIMES DAILY
Qty: 40 CAPSULE | Refills: 0 | Status: ON HOLD | OUTPATIENT
Start: 2023-01-16 | End: 2023-02-24 | Stop reason: HOSPADM

## 2023-01-16 RX ORDER — FUROSEMIDE 10 MG/ML
20 INJECTION INTRAMUSCULAR; INTRAVENOUS ONCE
Status: COMPLETED | OUTPATIENT
Start: 2023-01-16 | End: 2023-01-16

## 2023-01-16 RX ORDER — DOXYCYCLINE HYCLATE 100 MG
100 TABLET ORAL 2 TIMES DAILY
Qty: 20 TABLET | Refills: 0 | Status: ON HOLD | OUTPATIENT
Start: 2023-01-16 | End: 2023-02-24 | Stop reason: HOSPADM

## 2023-01-16 RX ADMIN — FUROSEMIDE 20 MG: 10 INJECTION, SOLUTION INTRAMUSCULAR; INTRAVENOUS at 16:12

## 2023-01-16 ASSESSMENT — ENCOUNTER SYMPTOMS
SHORTNESS OF BREATH: 1
COUGH: 0

## 2023-01-16 NOTE — ED NOTES
Arrived from the CHF clinic. She has her own wheelchair and oxygen with her.       Nicole Block RN  01/16/23 1002

## 2023-01-16 NOTE — ED NOTES
Patient is from [facility], station 3. Arranging transportation back to facility. Paperwork prepared.      Dafne Velasquez RN  01/16/23 [id number]

## 2023-01-16 NOTE — PROGRESS NOTES
9: 21 AM  Spoke with patient's nurse at [facility] re: ED evaluation after CHF clinic visit.      Kezia Laguerre RN

## 2023-01-16 NOTE — PROGRESS NOTES
9:22 AM 1/16/2023  Follow up appointment for the CHF clinic today. Pt is afib RVR, lethargic and hypotensive and increase in her weight by 4 lbs since Friday visit. Called [facility] notified patient status and taking patient to ER. BMP, Pro-BNP drawn. 9:23 AM Left a voicemail for Marni Waite, patient's son, per her request. Updated him on the voicemail that patient was taken to ED for further evaluation. Abby Billing 9:25 AM Articluis Curtis, patient's daughter, per her request. Updated her on patient status and taking her to ED for further evaluation.

## 2023-01-16 NOTE — ED PROVIDER NOTES
Department of Emergency Medicine   ED  Provider Note  Admit Date/RoomTime: 1/16/2023  9:28 AM  ED Room: 01/01          History of Present Illness:  1/16/23, Time: 9:53 AM EST  Chief Complaint   Patient presents with    Leg Swelling     Brought from [facility] clinic for hypotension and 4 lb weightgain over last several days            Nawaf Manriquez is a 67 y.o. female presenting to the ED for leg swelling, shortness of breath, and hypotension. Patient states that she is short of breath. She does wear 2 to 3 L at baseline. Reportedly patient was at the CHF clinic and was hypotensive. She has also had a 4 pound weight gain over the past week as well. Patient otherwise denies any fevers, chest pain, cough, nausea, vomiting, changes to her urine or stool, or abdominal pain. Review of Systems   Constitutional:  Positive for unexpected weight change. Negative for fever. Respiratory:  Positive for shortness of breath. Negative for cough. Cardiovascular:  Positive for leg swelling. Negative for chest pain. All other systems reviewed and are negative.       --------------------------------------------- PAST HISTORY ---------------------------------------------  Past Medical History:  has a past medical history of A-fib ([facility] [address] Acute on chronic congestive heart failure ([facility] [address] Anxiety, Asthma, CAD (coronary artery disease), Cancer ([facility] [address] Chronic kidney disease, COPD exacerbation ([facility] [address] Depression, Diabetes mellitus ([facility] [address] H/O mammogram, Hx MRSA infection, Hyperlipidemia, Hypertension, Lateral epicondylitis, Morbid obesity ([address] SERENA on CPAP, Oxygen dependent, Parkinson's disease ([address] and Tubal ligation status. Past Surgical History:  has a past surgical history that includes Gallbladder surgery; Toe amputation; Cholecystectomy; Colonoscopy (7/29/15); Endoscopy, colon, diagnostic (7/19/15); Upper gastrointestinal endoscopy; lumbar laminectomy;  Tonsillectomy; Breast lumpectomy; Breast reduction surgery; Cardiac catheterization (4/28/2014); Cardiac catheterization (01/11/2022); CT GUIDED CHEST TUBE (7/8/2022); and Upper gastrointestinal endoscopy (N/A, 7/19/2022). Social History:  reports that she has never smoked. She has never used smokeless tobacco. She reports that she does not drink alcohol and does not use drugs. Family History: family history includes Cancer in her father and mother; Diabetes in her sister; Heart Disease in her sister; Other in her brother; Seizures in her son. . Unless otherwise noted, family history is non contributory    The patients home medications have been reviewed. Allergies: Ciprofloxacin, Codeine, and Digoxin and related        ---------------------------------------------------PHYSICAL EXAM--------------------------------------    Physical Exam  Vitals and nursing note reviewed. Constitutional:       General: She is not in acute distress. Appearance: She is well-developed. She is obese. HENT:      Head: Normocephalic and atraumatic. Eyes:      Conjunctiva/sclera: Conjunctivae normal.   Cardiovascular:      Rate and Rhythm: Normal rate and regular rhythm. Heart sounds: Normal heart sounds. No murmur heard. Pulmonary:      Effort: Pulmonary effort is normal. No respiratory distress. Comments: Decreased breath sounds throughout likely secondary to body habitus  Abdominal:      Palpations: Abdomen is soft. Tenderness: There is no abdominal tenderness. There is no guarding or rebound. Musculoskeletal:      Cervical back: Normal range of motion and neck supple. Right lower leg: Edema present. Left lower leg: Edema present. Skin:     General: Skin is warm and dry. Comments: Ruptured blister anterior left leg   Neurological:      Mental Status: She is alert and oriented to person, place, and time. Cranial Nerves: No cranial nerve deficit.       Coordination: Coordination normal.          -------------------------------------------------- RESULTS -------------------------------------------------  I have personally reviewed all laboratory and imaging results for this patient. Results are listed below.      LABS: (Lab results interpreted by me)  Results for orders placed or performed during the hospital encounter of 01/16/23   CBC with Auto Differential   Result Value Ref Range    WBC 8.9 4.5 - 11.5 E9/L    RBC 3.43 (L) 3.50 - 5.50 E12/L    Hemoglobin 8.7 (L) 11.5 - 15.5 g/dL    Hematocrit 32.6 (L) 34.0 - 48.0 %    MCV 95.0 80.0 - 99.9 fL    MCH 25.4 (L) 26.0 - 35.0 pg    MCHC 26.7 (L) 32.0 - 34.5 %    RDW 16.5 (H) 11.5 - 15.0 fL    Platelets 144 756 - 993 E9/L    MPV 10.7 7.0 - 12.0 fL    Neutrophils % 81.7 (H) 43.0 - 80.0 %    Lymphocytes % 7.8 (L) 20.0 - 42.0 %    Monocytes % 6.1 2.0 - 12.0 %    Eosinophils % 0.9 0.0 - 6.0 %    Basophils % 1.7 0.0 - 2.0 %    Neutrophils Absolute 7.39 (H) 1.80 - 7.30 E9/L    Lymphocytes Absolute 0.71 (L) 1.50 - 4.00 E9/L    Monocytes Absolute 0.53 0.10 - 0.95 E9/L    Eosinophils Absolute 0.08 0.05 - 0.50 E9/L    Basophils Absolute 0.15 0.00 - 0.20 E9/L    Myelocyte Percent 1.7 0 - 0 %    nRBC 0.9 /100 WBC    Anisocytosis 2+     Polychromasia 1+     Hypochromia 1+     Poikilocytes 1+     Ovalocytes 1+     Stomatocytes 1+     Tear Drop Cells 1+    Comprehensive Metabolic Panel w/ Reflex to MG   Result Value Ref Range    Sodium 141 132 - 146 mmol/L    Potassium reflex Magnesium 4.5 3.5 - 5.0 mmol/L    Chloride 93 (L) 98 - 107 mmol/L    CO2 39 (H) 22 - 29 mmol/L    Anion Gap 9 7 - 16 mmol/L    Glucose 79 74 - 99 mg/dL    BUN 38 (H) 6 - 23 mg/dL    Creatinine 2.1 (H) 0.5 - 1.0 mg/dL    Est, Glom Filt Rate 24 >=60 mL/min/1.73    Calcium 9.1 8.6 - 10.2 mg/dL    Total Protein 6.3 (L) 6.4 - 8.3 g/dL    Albumin 3.8 3.5 - 5.2 g/dL    Total Bilirubin 0.4 0.0 - 1.2 mg/dL    Alkaline Phosphatase 96 35 - 104 U/L    ALT <5 0 - 32 U/L    AST 10 0 - 31 U/L   Troponin   Result Value Ref Range    Troponin, High Sensitivity 98 (H) 0 - 9 ng/L   Brain Natriuretic Peptide   Result Value Ref Range    Pro-BNP 20,665 (H) 0 - 125 pg/mL   Troponin   Result Value Ref Range    Troponin, High Sensitivity 90 (H) 0 - 9 ng/L   EKG 12 Lead   Result Value Ref Range    Ventricular Rate 92 BPM    Atrial Rate 96 BPM    QRS Duration 68 ms    Q-T Interval 316 ms    QTc Calculation (Bazett) 390 ms    R Axis 109 degrees    T Axis 62 degrees   ,       RADIOLOGY:  Interpreted by Radiologist unless otherwise specified  XR FOOT RIGHT (MIN 3 VIEWS)   Final Result   No evident acute osteomyelitis. Great toe soft tissue ulcer. Soft tissue   swelling. See above. XR CHEST PORTABLE   Final Result   Stable right-sided pleural effusion with adjacent atelectasis and or   infiltrate.                      ------------------------- NURSING NOTES AND VITALS REVIEWED ---------------------------   The nursing notes within the ED encounter and vital signs as below have been reviewed by myself  /63   Pulse 77   Temp 98.1 °F (36.7 °C) (Oral)   Resp 20   Wt 259 lb (117.5 kg)   SpO2 99%   BMI 50.58 kg/m²     Oxygen Saturation Interpretation: Normal    The patients available past medical records and past encounters were reviewed. ------------------------------ ED COURSE/MEDICAL DECISION MAKING----------------------  Medications   furosemide (LASIX) injection 20 mg (20 mg IntraVENous Given 1/16/23 1612)          Medical Decision Making  Amount and/or Complexity of Data Reviewed  Labs: ordered. Radiology: ordered. ECG/medicine tests: ordered. Risk  Prescription drug management. ED Course as of 01/17/23 0740   Mon Jan 16, 2023   0948   ATTENDING PROVIDER ATTESTATION:     I have personally performed and/or participated in the history, exam, medical decision making, and procedures and agree with all pertinent clinical information unless otherwise noted.     I have also reviewed and agree with the past medical, family and social history unless otherwise noted.    I have discussed this patient in detail with the resident and provided the instruction and education regarding the evidence-based evaluation and treatment of increased weight and hypotension. Any EKG that may have been performed has been personally reviewed by me and I agree with the documentation as noted by the resident. History: patient states she feels SOB and fatigued. She was at the CHF clinic this AM and had a low blood pressure. She states she thought her edema had been improving. No CP. My findings: Azra John is a 67 y.o. female whom is in no distress. Physical exam reveals chronically ill in appearance. Heart RRR, lungs CTA, abdomen is soft and nontender. Mild edema of the lower extremities. Anterior ulceration of the left leg. My plan: Symptomatic and supportive care. Will evaluate with labs and imaging. Electronically signed by Tessie Gleason DO on 1/16/23 at 9:48 AM EST       [JS]   1046 Patient presents with shortness of breath and leg swelling. EKG did not meet STEMI criteria. CBC showed a stable anemia but no leukocytosis. BNP was elevated greater than 20,000. She does appear fluid overloaded as well. Lasix was given. Troponins were elevated at 98 and then 90 although patient does have a history of CKD and they are downtrending. EKG did not meet STEMI criteria. Low concern for ACS. Chest x-ray did not show any pneumothorax otherwise agree with radiology report. X-ray of the foot was obtained to evaluate for patient's wound. No signs of osteomyelitis. Patient will be given a prescription for Keflex and doxycycline advised to follow-up with podiatry. Patient's vitals are stable at this time. Patient discharged back to nursing facility [BB]   1046 EKG shows A. fib with a ventricular rate of 92 bpm.  There is a rightward axis. Does not meet STEMI criteria. Comparable to previous EKG on 12/30/2022.   As interpreted by myself ED physician. [BB] ED Course User Index  [BB] Aaron Gardiner DO  [JS] Tania Duran DO        Re-Evaluations:  Patient was reevaluted and continued to be stable. This patient's ED course included: a personal history and physicial examination, multiple bedside re-evaluations, IV medications, cardiac monitoring, and continuous pulse oximetry    This patient has remained hemodynamically stable during their ED course. Consultations:  None    Counseling: The emergency provider has spoken with the patient and family member patient and daughter and discussed todays results, in addition to providing specific details for the plan of care and counseling regarding the diagnosis and prognosis. Questions are answered at this time and they are agreeable with the plan.       --------------------------------- IMPRESSION AND DISPOSITION ---------------------------------    IMPRESSION  1. Chronic congestive heart failure, unspecified heart failure type ([address]    2. Open wound of great toe, right, initial encounter        DISPOSITION  Disposition: Discharge to nursing home  Patient condition is stable    Patient was seen and evaluated by both myself and Tania Duran, *. NOTE: This report was transcribed using voice recognition software.  Every effort was made to ensure accuracy; however, inadvertent computerized transcription errors may be present           Aaron Gardiner DO  Resident  01/17/23 0811

## 2023-01-17 ENCOUNTER — TELEPHONE (OUTPATIENT)
Dept: OTHER | Age: 73
End: 2023-01-17

## 2023-01-17 ENCOUNTER — HOSPITAL ENCOUNTER (INPATIENT)
Age: 73
LOS: 38 days | Discharge: OTHER INSTITUTION | End: 2023-02-24
Attending: EMERGENCY MEDICINE | Admitting: INTERNAL MEDICINE
Payer: MEDICARE

## 2023-01-17 ENCOUNTER — TELEPHONE (OUTPATIENT)
Dept: OTHER | Facility: CLINIC | Age: 73
End: 2023-01-17

## 2023-01-17 ENCOUNTER — APPOINTMENT (OUTPATIENT)
Dept: GENERAL RADIOLOGY | Age: 73
End: 2023-01-17
Payer: MEDICARE

## 2023-01-17 DIAGNOSIS — J95.811 POSTPROCEDURAL PNEUMOTHORAX: ICD-10-CM

## 2023-01-17 DIAGNOSIS — I21.4 NSTEMI (NON-ST ELEVATED MYOCARDIAL INFARCTION) (HCC): ICD-10-CM

## 2023-01-17 DIAGNOSIS — I95.9 HYPOTENSION, UNSPECIFIED HYPOTENSION TYPE: ICD-10-CM

## 2023-01-17 DIAGNOSIS — J96.22 ACUTE ON CHRONIC RESPIRATORY FAILURE WITH HYPOXIA AND HYPERCAPNIA (HCC): ICD-10-CM

## 2023-01-17 DIAGNOSIS — D69.6 THROMBOCYTOPENIA (HCC): ICD-10-CM

## 2023-01-17 DIAGNOSIS — J96.21 ACUTE ON CHRONIC RESPIRATORY FAILURE WITH HYPOXIA AND HYPERCAPNIA (HCC): ICD-10-CM

## 2023-01-17 DIAGNOSIS — J90 RECURRENT RIGHT PLEURAL EFFUSION: ICD-10-CM

## 2023-01-17 DIAGNOSIS — I48.91 ATRIAL FIBRILLATION WITH RAPID VENTRICULAR RESPONSE (HCC): Primary | ICD-10-CM

## 2023-01-17 LAB
AADO2: 272.5 MMHG
AADO2: 399 MMHG
ALBUMIN SERPL-MCNC: 3.6 G/DL (ref 3.5–5.2)
ALP BLD-CCNC: 98 U/L (ref 35–104)
ALT SERPL-CCNC: <5 U/L (ref 0–32)
ANION GAP SERPL CALCULATED.3IONS-SCNC: 10 MMOL/L (ref 7–16)
ANISOCYTOSIS: ABNORMAL
AST SERPL-CCNC: 9 U/L (ref 0–31)
B.E.: 10 MMOL/L (ref -3–3)
B.E.: 14.2 MMOL/L (ref -3–3)
BASOPHILIC STIPPLING: ABNORMAL
BASOPHILS ABSOLUTE: 0 E9/L (ref 0–0.2)
BASOPHILS RELATIVE PERCENT: 0.3 % (ref 0–2)
BILIRUB SERPL-MCNC: 0.5 MG/DL (ref 0–1.2)
BILIRUBIN DIRECT: 0.3 MG/DL (ref 0–0.3)
BILIRUBIN, INDIRECT: 0.2 MG/DL (ref 0–1)
BUN BLDV-MCNC: 44 MG/DL (ref 6–23)
CALCIUM SERPL-MCNC: 9.3 MG/DL (ref 8.6–10.2)
CHLORIDE BLD-SCNC: 94 MMOL/L (ref 98–107)
CO2: 37 MMOL/L (ref 22–29)
COHB: 0.5 % (ref 0–1.5)
COHB: 0.8 % (ref 0–1.5)
CREAT SERPL-MCNC: 2.1 MG/DL (ref 0.5–1)
CRITICAL: ABNORMAL
CRITICAL: ABNORMAL
DATE ANALYZED: ABNORMAL
DATE ANALYZED: ABNORMAL
DATE OF COLLECTION: ABNORMAL
DATE OF COLLECTION: ABNORMAL
EOSINOPHILS ABSOLUTE: 0 E9/L (ref 0.05–0.5)
EOSINOPHILS RELATIVE PERCENT: 0.2 % (ref 0–6)
FIO2: 80 %
FIO2: 80 %
GFR SERPL CREATININE-BSD FRML MDRD: 24 ML/MIN/1.73
GLUCOSE BLD-MCNC: 94 MG/DL (ref 74–99)
HCO3: 39.2 MMOL/L (ref 22–26)
HCO3: 43.9 MMOL/L (ref 22–26)
HCT VFR BLD CALC: 31.1 % (ref 34–48)
HEMOGLOBIN: 8.1 G/DL (ref 11.5–15.5)
HHB: 1.4 % (ref 0–5)
HHB: 21.3 % (ref 0–5)
HYPOCHROMIA: ABNORMAL
INFLUENZA A BY PCR: NOT DETECTED
INFLUENZA B BY PCR: NOT DETECTED
LAB: ABNORMAL
LAB: ABNORMAL
LACTIC ACID, SEPSIS: 1.7 MMOL/L (ref 0.5–1.9)
LYMPHOCYTES ABSOLUTE: 0.99 E9/L (ref 1.5–4)
LYMPHOCYTES RELATIVE PERCENT: 8.8 % (ref 20–42)
Lab: ABNORMAL
Lab: ABNORMAL
MCH RBC QN AUTO: 24.7 PG (ref 26–35)
MCHC RBC AUTO-ENTMCNC: 26 % (ref 32–34.5)
MCV RBC AUTO: 94.8 FL (ref 80–99.9)
METHB: 0.5 % (ref 0–1.5)
METHB: 0.5 % (ref 0–1.5)
MODE: ABNORMAL
MODE: ABNORMAL
MONOCYTES ABSOLUTE: 0.66 E9/L (ref 0.1–0.95)
MONOCYTES RELATIVE PERCENT: 6.1 % (ref 2–12)
NEUTROPHILS ABSOLUTE: 9.35 E9/L (ref 1.8–7.3)
NEUTROPHILS RELATIVE PERCENT: 85.1 % (ref 43–80)
O2 CONTENT: 12.8 ML/DL
O2 CONTENT: 9.7 ML/DL
O2 SATURATION: 78.4 % (ref 92–98.5)
O2 SATURATION: 98.6 % (ref 92–98.5)
O2HB: 77.4 % (ref 94–97)
O2HB: 97.6 % (ref 94–97)
OPERATOR ID: 8219
OPERATOR ID: 8219
OVALOCYTES: ABNORMAL
PATIENT TEMP: 37 C
PATIENT TEMP: 37 C
PCO2: 100 MMHG (ref 35–45)
PCO2: 90 MMHG (ref 35–45)
PDW BLD-RTO: 16.2 FL (ref 11.5–15)
PEEP/CPAP: 6 CMH2O
PEEP/CPAP: 8 CMH2O
PFO2: 0.58 MMHG/%
PFO2: 2.29 MMHG/%
PH BLOOD GAS: 7.26 (ref 7.35–7.45)
PH BLOOD GAS: 7.26 (ref 7.35–7.45)
PIP: 16 CMH2O
PLATELET # BLD: 195 E9/L (ref 130–450)
PMV BLD AUTO: 10.4 FL (ref 7–12)
PO2: 183.4 MMHG (ref 75–100)
PO2: 46.4 MMHG (ref 75–100)
POIKILOCYTES: ABNORMAL
POLYCHROMASIA: ABNORMAL
POTASSIUM SERPL-SCNC: 4.8 MMOL/L (ref 3.5–5)
PRO-BNP: ABNORMAL PG/ML (ref 0–125)
RBC # BLD: 3.28 E12/L (ref 3.5–5.5)
RI(T): 1.49
RI(T): 8.6
RR MECHANICAL: 18 B/MIN
SARS-COV-2, NAAT: NOT DETECTED
SODIUM BLD-SCNC: 141 MMOL/L (ref 132–146)
SOURCE, BLOOD GAS: ABNORMAL
SOURCE, BLOOD GAS: ABNORMAL
STOMATOCYTES: ABNORMAL
TEAR DROP CELLS: ABNORMAL
THB: 8.9 G/DL (ref 11.5–16.5)
THB: 9 G/DL (ref 11.5–16.5)
TIME ANALYZED: 1752
TIME ANALYZED: 2004
TOTAL PROTEIN: 6.1 G/DL (ref 6.4–8.3)
TROPONIN, HIGH SENSITIVITY: 114 NG/L (ref 0–9)
TROPONIN, HIGH SENSITIVITY: 117 NG/L (ref 0–9)
VT MECHANICAL: 400 ML
WBC # BLD: 11 E9/L (ref 4.5–11.5)

## 2023-01-17 PROCEDURE — 85025 COMPLETE CBC W/AUTO DIFF WBC: CPT

## 2023-01-17 PROCEDURE — 87635 SARS-COV-2 COVID-19 AMP PRB: CPT

## 2023-01-17 PROCEDURE — 71045 X-RAY EXAM CHEST 1 VIEW: CPT

## 2023-01-17 PROCEDURE — 6370000000 HC RX 637 (ALT 250 FOR IP): Performed by: STUDENT IN AN ORGANIZED HEALTH CARE EDUCATION/TRAINING PROGRAM

## 2023-01-17 PROCEDURE — 99285 EMERGENCY DEPT VISIT HI MDM: CPT

## 2023-01-17 PROCEDURE — 87186 SC STD MICRODIL/AGAR DIL: CPT

## 2023-01-17 PROCEDURE — 84484 ASSAY OF TROPONIN QUANT: CPT

## 2023-01-17 PROCEDURE — 82805 BLOOD GASES W/O2 SATURATION: CPT

## 2023-01-17 PROCEDURE — 83605 ASSAY OF LACTIC ACID: CPT

## 2023-01-17 PROCEDURE — 87077 CULTURE AEROBIC IDENTIFY: CPT

## 2023-01-17 PROCEDURE — 2580000003 HC RX 258: Performed by: INTERNAL MEDICINE

## 2023-01-17 PROCEDURE — 94640 AIRWAY INHALATION TREATMENT: CPT

## 2023-01-17 PROCEDURE — 6360000002 HC RX W HCPCS: Performed by: NURSE PRACTITIONER

## 2023-01-17 PROCEDURE — 2000000000 HC ICU R&B

## 2023-01-17 PROCEDURE — 87040 BLOOD CULTURE FOR BACTERIA: CPT

## 2023-01-17 PROCEDURE — 87070 CULTURE OTHR SPECIMN AEROBIC: CPT

## 2023-01-17 PROCEDURE — 2580000003 HC RX 258: Performed by: NURSE PRACTITIONER

## 2023-01-17 PROCEDURE — 6360000002 HC RX W HCPCS: Performed by: STUDENT IN AN ORGANIZED HEALTH CARE EDUCATION/TRAINING PROGRAM

## 2023-01-17 PROCEDURE — 96374 THER/PROPH/DIAG INJ IV PUSH: CPT

## 2023-01-17 PROCEDURE — 96361 HYDRATE IV INFUSION ADD-ON: CPT

## 2023-01-17 PROCEDURE — 36415 COLL VENOUS BLD VENIPUNCTURE: CPT

## 2023-01-17 PROCEDURE — 87081 CULTURE SCREEN ONLY: CPT

## 2023-01-17 PROCEDURE — 99223 1ST HOSP IP/OBS HIGH 75: CPT | Performed by: INTERNAL MEDICINE

## 2023-01-17 PROCEDURE — 80076 HEPATIC FUNCTION PANEL: CPT

## 2023-01-17 PROCEDURE — 80048 BASIC METABOLIC PNL TOTAL CA: CPT

## 2023-01-17 PROCEDURE — 2580000003 HC RX 258: Performed by: STUDENT IN AN ORGANIZED HEALTH CARE EDUCATION/TRAINING PROGRAM

## 2023-01-17 PROCEDURE — 83880 ASSAY OF NATRIURETIC PEPTIDE: CPT

## 2023-01-17 PROCEDURE — 87502 INFLUENZA DNA AMP PROBE: CPT

## 2023-01-17 PROCEDURE — 2500000003 HC RX 250 WO HCPCS: Performed by: NURSE PRACTITIONER

## 2023-01-17 RX ORDER — MIRTAZAPINE 15 MG/1
7.5 TABLET, FILM COATED ORAL NIGHTLY
Status: DISCONTINUED | OUTPATIENT
Start: 2023-01-18 | End: 2023-02-24 | Stop reason: HOSPADM

## 2023-01-17 RX ORDER — MONTELUKAST SODIUM 10 MG/1
10 TABLET ORAL NIGHTLY
Status: DISCONTINUED | OUTPATIENT
Start: 2023-01-18 | End: 2023-02-24 | Stop reason: HOSPADM

## 2023-01-17 RX ORDER — PROMETHAZINE HYDROCHLORIDE 25 MG/1
12.5 TABLET ORAL EVERY 6 HOURS PRN
Status: DISCONTINUED | OUTPATIENT
Start: 2023-01-17 | End: 2023-02-24 | Stop reason: HOSPADM

## 2023-01-17 RX ORDER — METOPROLOL SUCCINATE 25 MG/1
25 TABLET, EXTENDED RELEASE ORAL 2 TIMES DAILY
Status: ON HOLD | COMMUNITY
End: 2023-02-24 | Stop reason: HOSPADM

## 2023-01-17 RX ORDER — INSULIN LISPRO 100 [IU]/ML
0-8 INJECTION, SOLUTION INTRAVENOUS; SUBCUTANEOUS
Status: DISCONTINUED | OUTPATIENT
Start: 2023-01-18 | End: 2023-01-18 | Stop reason: SDUPTHER

## 2023-01-17 RX ORDER — MIDODRINE HYDROCHLORIDE 5 MG/1
5 TABLET ORAL
Status: DISCONTINUED | OUTPATIENT
Start: 2023-01-18 | End: 2023-02-02

## 2023-01-17 RX ORDER — 0.9 % SODIUM CHLORIDE 0.9 %
1000 INTRAVENOUS SOLUTION INTRAVENOUS ONCE
Status: COMPLETED | OUTPATIENT
Start: 2023-01-17 | End: 2023-01-17

## 2023-01-17 RX ORDER — SODIUM CHLORIDE 0.9 % (FLUSH) 0.9 %
10 SYRINGE (ML) INJECTION EVERY 12 HOURS SCHEDULED
Status: DISCONTINUED | OUTPATIENT
Start: 2023-01-17 | End: 2023-02-24 | Stop reason: HOSPADM

## 2023-01-17 RX ORDER — DEXAMETHASONE SODIUM PHOSPHATE 10 MG/ML
10 INJECTION INTRAMUSCULAR; INTRAVENOUS ONCE
Status: COMPLETED | OUTPATIENT
Start: 2023-01-17 | End: 2023-01-17

## 2023-01-17 RX ORDER — MIDODRINE HYDROCHLORIDE 5 MG/1
10 TABLET ORAL ONCE
Status: COMPLETED | OUTPATIENT
Start: 2023-01-17 | End: 2023-01-17

## 2023-01-17 RX ORDER — ACETAMINOPHEN 325 MG/1
650 TABLET ORAL EVERY 6 HOURS PRN
Status: DISCONTINUED | OUTPATIENT
Start: 2023-01-17 | End: 2023-02-24 | Stop reason: HOSPADM

## 2023-01-17 RX ORDER — ATORVASTATIN CALCIUM 20 MG/1
20 TABLET, FILM COATED ORAL NIGHTLY
Status: DISCONTINUED | OUTPATIENT
Start: 2023-01-18 | End: 2023-01-18

## 2023-01-17 RX ORDER — PANTOPRAZOLE SODIUM 40 MG/1
40 TABLET, DELAYED RELEASE ORAL
Status: DISCONTINUED | OUTPATIENT
Start: 2023-01-18 | End: 2023-02-24 | Stop reason: HOSPADM

## 2023-01-17 RX ORDER — PREDNISONE 20 MG/1
40 TABLET ORAL DAILY
Status: DISCONTINUED | OUTPATIENT
Start: 2023-01-18 | End: 2023-01-18

## 2023-01-17 RX ORDER — SODIUM CHLORIDE 9 MG/ML
INJECTION, SOLUTION INTRAVENOUS PRN
Status: DISCONTINUED | OUTPATIENT
Start: 2023-01-17 | End: 2023-02-24 | Stop reason: HOSPADM

## 2023-01-17 RX ORDER — ASPIRIN 81 MG/1
81 TABLET ORAL DAILY
Status: DISCONTINUED | OUTPATIENT
Start: 2023-01-18 | End: 2023-02-05

## 2023-01-17 RX ORDER — ACETAMINOPHEN 650 MG/1
650 SUPPOSITORY RECTAL EVERY 6 HOURS PRN
Status: DISCONTINUED | OUTPATIENT
Start: 2023-01-17 | End: 2023-02-24 | Stop reason: HOSPADM

## 2023-01-17 RX ORDER — IPRATROPIUM BROMIDE AND ALBUTEROL SULFATE 2.5; .5 MG/3ML; MG/3ML
1 SOLUTION RESPIRATORY (INHALATION) EVERY 4 HOURS
Status: DISCONTINUED | OUTPATIENT
Start: 2023-01-17 | End: 2023-01-17

## 2023-01-17 RX ORDER — SODIUM CHLORIDE 9 MG/ML
INJECTION, SOLUTION INTRAVENOUS CONTINUOUS
Status: DISCONTINUED | OUTPATIENT
Start: 2023-01-17 | End: 2023-01-21

## 2023-01-17 RX ORDER — POLYETHYLENE GLYCOL 3350 17 G/17G
17 POWDER, FOR SOLUTION ORAL DAILY PRN
Status: DISCONTINUED | OUTPATIENT
Start: 2023-01-17 | End: 2023-02-24 | Stop reason: HOSPADM

## 2023-01-17 RX ORDER — ENOXAPARIN SODIUM 100 MG/ML
30 INJECTION SUBCUTANEOUS DAILY
Status: DISCONTINUED | OUTPATIENT
Start: 2023-01-18 | End: 2023-01-17

## 2023-01-17 RX ORDER — INSULIN LISPRO 100 [IU]/ML
0-4 INJECTION, SOLUTION INTRAVENOUS; SUBCUTANEOUS NIGHTLY
Status: DISCONTINUED | OUTPATIENT
Start: 2023-01-17 | End: 2023-01-18 | Stop reason: SDUPTHER

## 2023-01-17 RX ORDER — IPRATROPIUM BROMIDE AND ALBUTEROL SULFATE 2.5; .5 MG/3ML; MG/3ML
1 SOLUTION RESPIRATORY (INHALATION) ONCE
Status: COMPLETED | OUTPATIENT
Start: 2023-01-17 | End: 2023-01-17

## 2023-01-17 RX ORDER — ARFORMOTEROL TARTRATE 15 UG/2ML
15 SOLUTION RESPIRATORY (INHALATION) 2 TIMES DAILY
Status: DISCONTINUED | OUTPATIENT
Start: 2023-01-17 | End: 2023-02-24 | Stop reason: HOSPADM

## 2023-01-17 RX ORDER — CARBIDOPA AND LEVODOPA 50; 200 MG/1; MG/1
1 TABLET, EXTENDED RELEASE ORAL 3 TIMES DAILY
Status: DISCONTINUED | OUTPATIENT
Start: 2023-01-18 | End: 2023-02-24 | Stop reason: HOSPADM

## 2023-01-17 RX ORDER — ONDANSETRON 2 MG/ML
4 INJECTION INTRAMUSCULAR; INTRAVENOUS EVERY 6 HOURS PRN
Status: DISCONTINUED | OUTPATIENT
Start: 2023-01-17 | End: 2023-02-24 | Stop reason: HOSPADM

## 2023-01-17 RX ORDER — DIGOXIN 0.25 MG/ML
250 INJECTION INTRAMUSCULAR; INTRAVENOUS ONCE
Status: DISCONTINUED | OUTPATIENT
Start: 2023-01-17 | End: 2023-01-19

## 2023-01-17 RX ORDER — DEXTROSE MONOHYDRATE 100 MG/ML
INJECTION, SOLUTION INTRAVENOUS CONTINUOUS PRN
Status: DISCONTINUED | OUTPATIENT
Start: 2023-01-17 | End: 2023-02-17 | Stop reason: SDUPTHER

## 2023-01-17 RX ORDER — SODIUM CHLORIDE 0.9 % (FLUSH) 0.9 %
10 SYRINGE (ML) INJECTION PRN
Status: DISCONTINUED | OUTPATIENT
Start: 2023-01-17 | End: 2023-02-24 | Stop reason: HOSPADM

## 2023-01-17 RX ORDER — IPRATROPIUM BROMIDE AND ALBUTEROL SULFATE 2.5; .5 MG/3ML; MG/3ML
1 SOLUTION RESPIRATORY (INHALATION)
Status: DISCONTINUED | OUTPATIENT
Start: 2023-01-18 | End: 2023-01-18

## 2023-01-17 RX ADMIN — DEXAMETHASONE SODIUM PHOSPHATE 10 MG: 10 INJECTION INTRAMUSCULAR; INTRAVENOUS at 18:10

## 2023-01-17 RX ADMIN — SODIUM CHLORIDE: 9 INJECTION, SOLUTION INTRAVENOUS at 22:06

## 2023-01-17 RX ADMIN — ARFORMOTEROL TARTRATE 15 MCG: 15 SOLUTION RESPIRATORY (INHALATION) at 21:51

## 2023-01-17 RX ADMIN — ANTI-FUNGAL POWDER MICONAZOLE NITRATE TALC FREE: 1.42 POWDER TOPICAL at 22:05

## 2023-01-17 RX ADMIN — MIDODRINE HYDROCHLORIDE 10 MG: 5 TABLET ORAL at 18:11

## 2023-01-17 RX ADMIN — Medication 10 ML: at 21:57

## 2023-01-17 RX ADMIN — IPRATROPIUM BROMIDE AND ALBUTEROL SULFATE 1 AMPULE: .5; 3 SOLUTION RESPIRATORY (INHALATION) at 17:40

## 2023-01-17 RX ADMIN — CEFTRIAXONE 1000 MG: 1 INJECTION, POWDER, FOR SOLUTION INTRAMUSCULAR; INTRAVENOUS at 21:57

## 2023-01-17 RX ADMIN — SODIUM CHLORIDE 1000 ML: 9 INJECTION, SOLUTION INTRAVENOUS at 18:09

## 2023-01-17 ASSESSMENT — PAIN SCALES - GENERAL: PAINLEVEL_OUTOF10: 0

## 2023-01-17 ASSESSMENT — LIFESTYLE VARIABLES
HOW OFTEN DO YOU HAVE A DRINK CONTAINING ALCOHOL: NEVER
HOW OFTEN DO YOU HAVE A DRINK CONTAINING ALCOHOL: NEVER

## 2023-01-17 ASSESSMENT — PAIN - FUNCTIONAL ASSESSMENT
PAIN_FUNCTIONAL_ASSESSMENT: NONE - DENIES PAIN

## 2023-01-17 NOTE — TELEPHONE ENCOUNTER
Writer contacted Dr. Kvng Tejada to inform of 30 day readmission risk. No Decision on disposition at this time.     Call Back: If you need to call back to inform of disposition you can contact me at [phone]

## 2023-01-17 NOTE — ED PROVIDER NOTES
[address]        Pt Name: Corinne Magana  MRN: [id number]  [place] 1950  Date of evaluation: 1/17/2023  Provider: Blayne Braun MD  PCP: No primary care provider on file. Note Started: 5:29 PM EST 1/17/23    CHIEF COMPLAINT       Chief Complaint   Patient presents with    Respiratory Distress     Normally on 3L NC, came in on CPAP, given total of 50mcg push dose epi for low BP by EMS       HISTORY OF PRESENT ILLNESS: 1 or more Elements        Limitations to history : Dyspnea    Corinne Magana is a 67 y.o. female who presents to the emergency department for respiratory distress. Normally on 3 L came a EMS was called to patient's facility due to hypoxia. Was placed on CPAP by them, patient has a history of chronically low blood pressures on midodrine, for them she was hypotensive, she was given 50 mics of push dose epi with improvement of her blood pressures from the 70s to the 90s. Patient denies any chest pain, chest tightness, fevers, chills. Nursing Notes were all reviewed and agreed with or any disagreements were addressed in the HPI. REVIEW OF EXTERNAL NOTE :       On chart review patient has EF of 70 to 75% on echo from July 6735 with diastolic dysfunction. REVIEW OF SYSTEMS :      Positives and Pertinent negatives as per HPI.      SURGICAL HISTORY     Past Surgical History:   Procedure Laterality Date    BREAST LUMPECTOMY      BREAST REDUCTION SURGERY      CARDIAC CATHETERIZATION  4/28/2014    Dr. Chiquis Arredondo  01/11/2022    Dr. Ania Pascal  7/29/15    CT GUIDED CHEST TUBE  7/8/2022    CT GUIDED CHEST TUBE 7/8/2022 Hugh Antunez MD [facility]    ENDOSCOPY, COLON, DIAGNOSTIC  7/19/15    GALLBLADDER SURGERY      LUMBAR LAMINECTOMY      TOE AMPUTATION      TONSILLECTOMY      UPPER GASTROINTESTINAL ENDOSCOPY      UPPER GASTROINTESTINAL ENDOSCOPY N/A 7/19/2022    EGD ESOPHAGOGASTRODUODENOSCOPY performed by Jay Whitehead MD at [facility]       Previous Medications    ACETAMINOPHEN (TYLENOL) 325 MG TABLET    Take 650 mg by mouth every 4 hours as needed for Pain    APIXABAN (ELIQUIS) 5 MG TABS TABLET    Take 1 tablet by mouth 2 times daily    ASPIRIN EC 81 MG EC TABLET    Take 1 tablet by mouth daily    ATORVASTATIN (LIPITOR) 20 MG TABLET    Take 20 mg by mouth nightly    BENZOIN COMPOUND SOLUTION    Apply 1 Applicatorful topically nightly Apply topically to right toe    BIPAP MACHINE MISC    by Does not apply route nightly    BUDESONIDE-FORMOTEROL (SYMBICORT) 160-4.5 MCG/ACT AERO    Inhale 2 puffs into the lungs 2 times daily    BUMETANIDE (BUMEX) 2 MG TABLET    Take 1 tablet by mouth daily    CARBIDOPA-LEVODOPA (SINEMET CR)  MG PER EXTENDED RELEASE TABLET    Take 2 tablets by mouth in the morning and 2 tablets at noon and 2 tablets in the evening.     CEPHALEXIN (KEFLEX) 500 MG CAPSULE    Take 1 capsule by mouth 4 times daily for 10 days    DOCUSATE SODIUM (COLACE, DULCOLAX) 100 MG CAPS    Take 100 mg by mouth 2 times daily as needed for Constipation    DOXYCYCLINE HYCLATE (VIBRA-TABS) 100 MG TABLET    Take 1 tablet by mouth 2 times daily for 10 days    GLUCAGON, RDNA, 1 MG INJECTION    Inject 1 mg into the muscle as needed for Low blood sugar    GLUCOSE (TRUEPLUS) 15 GM/32ML GEL    Take 15 g by mouth as needed    HYDROXYZINE PAMOATE (VISTARIL) 25 MG CAPSULE    Take 25 mg by mouth 3 times daily as needed for Itching    INSULIN GLARGINE (LANTUS) 100 UNIT/ML INJECTION VIAL    Inject 13 Units into the skin 2 times daily    INSULIN LISPRO (HUMALOG) 100 UNIT/ML INJECTION VIAL    Inject into the skin 3 times daily (before meals) Sliding scale    IPRATROPIUM-ALBUTEROL (DUONEB) 0.5-2.5 (3) MG/3ML SOLN NEBULIZER SOLUTION    Inhale 1 vial into the lungs 4 times daily    LOPERAMIDE (IMODIUM) 2 MG CAPSULE    Take 2 mg by mouth 4 times daily as needed for Diarrhea    LORAZEPAM (ATIVAN) 0.5 MG TABLET    Take 0.5 mg by mouth nightly. METOPROLOL SUCCINATE (TOPROL XL) 25 MG EXTENDED RELEASE TABLET    Take 1 tablet by mouth 2 times daily    MICONAZOLE (MICOTIN) 2 % POWDER    Apply 1 each topically 2 times daily Apply topically under breasts twice daily    MIDODRINE (PROAMATINE) 5 MG TABLET    Take 1 tablet by mouth 3 times daily (with meals)    MIRTAZAPINE (REMERON) 15 MG TABLET    Take 7.5 mg by mouth nightly    MONTELUKAST (SINGULAIR) 10 MG TABLET    Take 10 mg by mouth nightly    OXYGEN    Inhale 3 L into the lungs continuous    PANTOPRAZOLE (PROTONIX) 40 MG TABLET    Take 40 mg by mouth every morning    ROPINIROLE (REQUIP) 1 MG TABLET    Take 1 tablet by mouth in the morning and 1 tablet at noon and 1 tablet before bedtime. SERTRALINE (ZOLOFT) 50 MG TABLET    Take 50 mg by mouth daily    SUCRALFATE (CARAFATE) 1 GM TABLET    Take 1 tablet by mouth in the morning and 1 tablet at noon and 1 tablet in the evening and 1 tablet before bedtime.        ALLERGIES     Ciprofloxacin, Codeine, and Digoxin and related    FAMILYHISTORY       Family History   Problem Relation Age of Onset    Cancer Mother         Lung Ca    Cancer Father         lung Ca    Diabetes Sister     Heart Disease Sister     Seizures Son     Other Brother         sepsis        SOCIAL HISTORY       Social History     Tobacco Use    Smoking status: Never    Smokeless tobacco: Never   Vaping Use    Vaping Use: Never used   Substance Use Topics    Alcohol use: No    Drug use: No       SCREENINGS        Rosa Coma Scale  Eye Opening: Spontaneous  Best Verbal Response: Oriented  Best Motor Response: Obeys commands  [place] Coma Scale Score: 15                CIWA Assessment  BP: 100/64  Heart Rate: (!) 105           PHYSICAL EXAM  1 or more Elements     ED Triage Vitals   BP Temp Temp Source Heart Rate Resp SpO2 Height Weight   01/17/23 1708 01/17/23 1704 01/17/23 1704 01/17/23 1704 01/17/23 1704 01/17/23 1704 -- --   115/85 98 °F (36.7 °C) Axillary (!) 146 20 100 %           Physical Exam  Vitals and nursing note reviewed. Constitutional:       General: She is not in acute distress. Appearance: Normal appearance. She is ill-appearing. HENT:      Head: Normocephalic and atraumatic. Right Ear: External ear normal.      Left Ear: External ear normal.      Nose: Nose normal.      Mouth/Throat:      Mouth: Mucous membranes are moist.   Eyes:      Extraocular Movements: Extraocular movements intact. Pupils: Pupils are equal, round, and reactive to light. Cardiovascular:      Rate and Rhythm: Tachycardia present. Rhythm irregular. Pulses: Normal pulses. Heart sounds: Normal heart sounds. Pulmonary:      Effort: Pulmonary effort is normal.      Comments: Diminished throughout  Abdominal:      General: Abdomen is flat. Bowel sounds are normal.      Palpations: Abdomen is soft. Musculoskeletal:         General: Normal range of motion. Cervical back: Normal range of motion and neck supple. Skin:     General: Skin is warm and dry. Neurological:      Mental Status: She is alert. DIAGNOSTIC RESULTS   LABS:    Labs Reviewed   CBC WITH AUTO DIFFERENTIAL - Abnormal; Notable for the following components:       Result Value    RBC 3.28 (*)     Hemoglobin 8.1 (*)     Hematocrit 31.1 (*)     MCH 24.7 (*)     MCHC 26.0 (*)     RDW 16.2 (*)     Neutrophils % 85.1 (*)     Lymphocytes % 8.8 (*)     Neutrophils Absolute 9.35 (*)     Lymphocytes Absolute 0.99 (*)     Eosinophils Absolute 0.00 (*)     All other components within normal limits   BASIC METABOLIC PANEL - Abnormal; Notable for the following components:    Chloride 94 (*)     CO2 37 (*)     BUN 44 (*)     Creatinine 2.1 (*)     All other components within normal limits   HEPATIC FUNCTION PANEL - Abnormal; Notable for the following components:     Total Protein 6.1 (*)     All other components within normal limits   TROPONIN - Abnormal; Notable for the following components:    Troponin, High Sensitivity 117 (*)     All other components within normal limits   BRAIN NATRIURETIC PEPTIDE - Abnormal; Notable for the following components:    Pro-BNP 21,273 (*)     All other components within normal limits   BLOOD GAS, ARTERIAL - Abnormal; Notable for the following components:    pH, Blood Gas 7.260 (*)     PCO2 100.0 (*)     PO2 46.4 (*)     HCO3 43.9 (*)     B.E. 14.2 (*)     O2 Sat 78.4 (*)     O2Hb 77.4 (*)     HHb 21.3 (*)     tHb (est) 8.9 (*)     All other components within normal limits   TROPONIN - Abnormal; Notable for the following components:    Troponin, High Sensitivity 114 (*)     All other components within normal limits   BLOOD GAS, ARTERIAL - Abnormal; Notable for the following components:    pH, Blood Gas 7.257 (*)     PCO2 90.0 (*)     PO2 183.4 (*)     HCO3 39.2 (*)     B.E. 10.0 (*)     O2 Sat 98.6 (*)     O2Hb 97.6 (*)     tHb (est) 9.0 (*)     All other components within normal limits   COVID-19, RAPID   RAPID INFLUENZA A/B ANTIGENS   CULTURE, BLOOD 1   CULTURE, BLOOD 2   LACTATE, SEPSIS   ARTERIAL BLOOD GAS, RESPIRATORY ONLY   ARTERIAL BLOOD GAS, RESPIRATORY ONLY       As interpreted by me, the above displayed labs are abnormal. All other labs obtained during this visit were within normal range or not returned as of this dictation. RADIOLOGY:   Non-plain film images such as CT, Ultrasound and MRI are read by the radiologist. Plain radiographic images are visualized and preliminarily interpreted by the ED Provider with the findings documented in the ED Course. Interpretation per the Radiologist below, if available at the time of this note:    XR CHEST PORTABLE   Final Result   Stable right pleural effusion. XR FOOT RIGHT (MIN 3 VIEWS)    Result Date: 1/16/2023  EXAMINATION: THREE XRAY VIEWS OF THE RIGHT FOOT 1/16/2023 12:40 pm COMPARISON: None.  HISTORY: ORDERING SYSTEM PROVIDED HISTORY: Evaluate big toe wound TECHNOLOGIST PROVIDED HISTORY: Reason for exam:->Evaluate big toe wound FINDINGS: Soft tissue wound evident at the great toe.  No radiographically visible acute osteomyelitis.  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  No radiopaque foreign body or soft tissue emphysema.  Plantar heel spur noted.     No evident acute osteomyelitis.  Great toe soft tissue ulcer.  Soft tissue swelling.  See above.     XR CHEST PORTABLE    Result Date: 1/16/2023  EXAMINATION: ONE XRAY VIEW OF THE CHEST 1/16/2023 8:53 am COMPARISON: 30 December 2022 HISTORY: ORDERING SYSTEM PROVIDED HISTORY: SOB TECHNOLOGIST PROVIDED HISTORY: Reason for exam:->SOB FINDINGS: Stable right pleural effusion with adjacent atelectasis and or infiltrate. Stable cardiomediastinal silhouette.  No new abnormal findings.     Stable right-sided pleural effusion with adjacent atelectasis and or infiltrate.       No results found.    PROCEDURES   Unless otherwise noted below, none       CRITICAL CARE TIME (.cct)   Please note that the withdrawal or failure to initiate urgent interventions for this patient would likely result in a life threatening deterioration or permanent disability.  Systems at risk for deterioration include: Respiratory    Accordingly this patient received 30 minutes of critical care time, excluding separately billable procedures.      PAST MEDICAL HISTORY/Chronic Conditions Affecting Care      has a past medical history of A-fib ([facility]), Acute on chronic congestive heart failure (HCC), Anxiety, Asthma, CAD (coronary artery disease) (01/21/2016), Cancer ([facility]) ( breast ca 2006), Chronic kidney disease, COPD exacerbation ([facility]) (10/12/2022), Depression, Diabetes mellitus ([facility]), H/O mammogram, MRSA infection, Hyperlipidemia, Hypertension, Lateral epicondylitis, Morbid obesity ([facility]), SERENA on CPAP, Oxygen dependent, Parkinson's disease ([facility]), and Tubal ligation status.  EMERGENCY DEPARTMENT COURSE    Vitals:    Vitals:    01/17/23 2017 01/17/23 2030 01/17/23 2032 01/17/23 2034   BP: 95/65  100/64    Pulse: (!) 110  (!) 127 (!) 105   Resp: 12 13 17   Temp:       TempSrc:       SpO2: 96% 96%  97%       Patient was given the following medications:  Medications   digoxin (LANOXIN) injection 250 mcg (250 mcg IntraVENous Not Given 1/17/23 1817)   0.9 % sodium chloride bolus (0 mLs IntraVENous Stopped 1/17/23 1918)   ipratropium-albuterol (DUONEB) nebulizer solution 1 ampule (1 ampule Inhalation Given 1/17/23 1740)   dexamethasone (DECADRON) injection 10 mg (10 mg IntraVENous Given 1/17/23 1810)   midodrine (PROAMATINE) tablet 10 mg (10 mg Oral Given 1/17/23 1811)         Is this patient to be included in the SEP-1 Core Measure due to severe sepsis or septic shock? No Exclusion criteria - the patient is NOT to be included for SEP-1 Core Measure due to: Infection is not suspected          Medical Decision Making/Differential Diagnosis:    CC/HPI Summary, Social Determinants of health, Records Reviewed, DDx, testing done/not done, ED Course, Reassessment, disposition considerations/shared decision making with patient, consults, disposition:        ED Course as of 01/17/23 2040 Tue Jan 17, 2023   1759 PCO2 on ABG is 100 with a pH of 7.26, she has been pulling low tidal volumes, also appears to be on midodrine, she has low blood pressure at this time. We will give a dose of her midodrine, as well as digoxin for her A. fib RVR, fluids, and adjust her BiPAP to aid with her hypercapnic respiratory failure, respiratory status is tenuous, and will discuss with family member potential for intubation [JG]   1805 EKG: This EKG is signed by emergency department physician.     Rate: 142  Rhythm: Atrial fibrillation and Rapid ventricular response  Interpretation: non-specific EKG  Comparison: changes compared to previous EKG from 12/30/2022     [JG]   1634 Notably anemic on CBC, however appears to be baseline for patient. Patient refused digoxin, she is alert and oriented x3 at this time [JG]   1913 EKG interpreted by ER attending physician. Ventricular rate 109, castration 78, QTc 406, atrial fibrillation with RVR. [HH]   1923 We will check a second troponin and blood gas. [JG]   2014 Repeat blood gas shows improving pH and PCO2 of 90 [JG]   216 76-year-old female presenting emerged from for respiratory distress, typically on 3 L nasal cannula, found to be more altered, and hypoxic, has had previous issues with hypercapnic respiratory failure per the daughter, patient's history provided by daughter and the family. Stated possible sepsis as patient's blood pressure was initially low, however significant proved with fluids, midodrine, cultures were obtained, however patient was not severely febrile, did not have a significant leukocytosis, and has had issues with chronic pleural effusion to suggest this was underlying infection. ABG initially concerning for mixed metabolic and respiratory acidosis, she was switched from her BiPAP settings she came in on EMS to AVAPS due to poor volumes on BiPAP. She is given Decadron and a DuoNeb as well as lung sounds are diminished. She was initially and rapid A. fib RVR, however this mostly improved with fluids, attempted to give digoxin, however patient and the family refused this as she has had issues with previous digoxin toxicity. Given her tenuous respiratory status, although she had good mentation, on top of her hypotension, and A. fib RVR, felt observation in the ICU would be appropriate. She was admitted the hospital under the hospitalist, and accepted for admission by the intensivist.  Spoke to the family, she is full code, however the family would like to have a discussion prior to intubation if it is needed.  [JG]   2040 Dr. Robb Sherman states this is not his patient so was admitted to the hospitalist [JG]      ED Course User Index  [HH] Darin BARNES Santiago Rush MD  [JG] Nory Crowder MD       Medical Decision Making  Amount and/or Complexity of Data Reviewed  Independent Historian: EMS  Labs: ordered. Decision-making details documented in ED Course. Radiology: ordered and independent interpretation performed. Decision-making details documented in ED Course. ECG/medicine tests: ordered and independent interpretation performed. Decision-making details documented in ED Course. Risk  Prescription drug management. Decision regarding hospitalization. CONSULTS: (Who and What was discussed)  IP CONSULT TO INTERNAL MEDICINE  IP CONSULT TO CRITICAL CARE  Hospitalist and intensivist      I am the Primary Clinician of Record. FINAL IMPRESSION      1. Atrial fibrillation with rapid ventricular response ([facility] [address]    2. Acute on chronic respiratory failure with hypoxia and hypercapnia (HCC)    3. Recurrent right pleural effusion    4. NSTEMI (non-ST elevated myocardial infarction) ([facility] [address]    5. Hypotension, unspecified hypotension type          DISPOSITION/PLAN     DISPOSITION Admitted 01/17/2023 08:33:59 PM      PATIENT REFERRED TO:  No follow-up provider specified.     DISCHARGE MEDICATIONS:  New Prescriptions    No medications on file       DISCONTINUED MEDICATIONS:  Discontinued Medications    No medications on file              (Please note that portions of this note were completed with a voice recognition program.  Efforts were made to edit the dictations but occasionally words are mis-transcribed.)    Dominick Butterfield MD (electronically signed)            Nory Crowder MD  Resident  01/17/23 2040

## 2023-01-17 NOTE — LETTER
[facility]  [address] [place] [address]  Phone: [phone]        February 15, 2023                To Whom It May Concern:    Zachary Davis was here visiting his mother today 2/15/2023. If you have any questions or concerns, please don't hesitate to call.     Sincerely,      Sid Abbott RN

## 2023-01-18 ENCOUNTER — APPOINTMENT (OUTPATIENT)
Dept: CT IMAGING | Age: 73
End: 2023-01-18
Payer: MEDICARE

## 2023-01-18 LAB
AADO2: 35.5 MMHG
ANION GAP SERPL CALCULATED.3IONS-SCNC: 5 MMOL/L (ref 7–16)
ANISOCYTOSIS: ABNORMAL
B.E.: 11.9 MMOL/L (ref -3–3)
BACTERIA: ABNORMAL /HPF
BASOPHILIC STIPPLING: ABNORMAL
BASOPHILS ABSOLUTE: 0 E9/L (ref 0–0.2)
BASOPHILS RELATIVE PERCENT: 0.1 % (ref 0–2)
BILIRUBIN URINE: NEGATIVE
BLOOD, URINE: NEGATIVE
BUN BLDV-MCNC: 47 MG/DL (ref 6–23)
CALCIUM SERPL-MCNC: 8.3 MG/DL (ref 8.6–10.2)
CHLORIDE BLD-SCNC: 97 MMOL/L (ref 98–107)
CHLORIDE URINE RANDOM: 23 MMOL/L
CLARITY: ABNORMAL
CO2: 39 MMOL/L (ref 22–29)
COHB: 0.5 % (ref 0–1.5)
COLOR: YELLOW
CREAT SERPL-MCNC: 2.2 MG/DL (ref 0.5–1)
CREATININE URINE: 95 MG/DL (ref 29–226)
CRITICAL: ABNORMAL
DATE ANALYZED: ABNORMAL
DATE OF COLLECTION: ABNORMAL
EOSINOPHILS ABSOLUTE: 0 E9/L (ref 0.05–0.5)
EOSINOPHILS RELATIVE PERCENT: 0 % (ref 0–6)
FIO2: 30 %
GFR SERPL CREATININE-BSD FRML MDRD: 23 ML/MIN/1.73
GLUCOSE BLD-MCNC: 147 MG/DL (ref 74–99)
GLUCOSE URINE: NEGATIVE MG/DL
HCO3: 39.9 MMOL/L (ref 22–26)
HCT VFR BLD CALC: 26.5 % (ref 34–48)
HEMOGLOBIN: 7.3 G/DL (ref 11.5–15.5)
HHB: 5.8 % (ref 0–5)
HYPOCHROMIA: ABNORMAL
KETONES, URINE: ABNORMAL MG/DL
LAB: ABNORMAL
LEUKOCYTE ESTERASE, URINE: ABNORMAL
LV EF: 65 %
LVEF MODALITY: NORMAL
LYMPHOCYTES ABSOLUTE: 0.4 E9/L (ref 1.5–4)
LYMPHOCYTES RELATIVE PERCENT: 4.3 % (ref 20–42)
Lab: ABNORMAL
MCH RBC QN AUTO: 25.7 PG (ref 26–35)
MCHC RBC AUTO-ENTMCNC: 27.5 % (ref 32–34.5)
MCV RBC AUTO: 93.3 FL (ref 80–99.9)
METER GLUCOSE: 151 MG/DL (ref 74–99)
METER GLUCOSE: 173 MG/DL (ref 74–99)
METER GLUCOSE: 300 MG/DL (ref 74–99)
METER GLUCOSE: 315 MG/DL (ref 74–99)
METER GLUCOSE: 335 MG/DL (ref 74–99)
METHB: 0.4 % (ref 0–1.5)
MODE: ABNORMAL
MONOCYTES ABSOLUTE: 0.2 E9/L (ref 0.1–0.95)
MONOCYTES RELATIVE PERCENT: 1.7 % (ref 2–12)
NEUTROPHILS ABSOLUTE: 9.31 E9/L (ref 1.8–7.3)
NEUTROPHILS RELATIVE PERCENT: 93.9 % (ref 43–80)
NITRITE, URINE: NEGATIVE
O2 CONTENT: 11.3 ML/DL
O2 SATURATION: 94.1 % (ref 92–98.5)
O2HB: 93.3 % (ref 94–97)
OPERATOR ID: ABNORMAL
OVALOCYTES: ABNORMAL
PATIENT TEMP: 37 C
PCO2: 80 MMHG (ref 35–45)
PDW BLD-RTO: 16 FL (ref 11.5–15)
PEEP/CPAP: 8 CMH2O
PFO2: 2.56 MMHG/%
PH BLOOD GAS: 7.32 (ref 7.35–7.45)
PH UA: 5.5 (ref 5–9)
PHOSPHORUS: 5.6 MG/DL (ref 2.5–4.5)
PIP: 18 CMH2O
PLATELET # BLD: 174 E9/L (ref 130–450)
PMV BLD AUTO: 10.3 FL (ref 7–12)
PO2: 76.9 MMHG (ref 75–100)
POIKILOCYTES: ABNORMAL
POLYCHROMASIA: ABNORMAL
POTASSIUM REFLEX MAGNESIUM: 5.2 MMOL/L (ref 3.5–5)
POTASSIUM, UR: 42 MMOL/L
PROCALCITONIN: 0.33 NG/ML (ref 0–0.08)
PROTEIN PROTEIN: 50 MG/DL (ref 0–12)
PROTEIN UA: ABNORMAL MG/DL
PROTEIN/CREAT RATIO: 0.5
PROTEIN/CREAT RATIO: 0.5 (ref 0–0.2)
RBC # BLD: 2.84 E12/L (ref 3.5–5.5)
RBC UA: ABNORMAL /HPF (ref 0–2)
RI(T): 0.46
SODIUM BLD-SCNC: 141 MMOL/L (ref 132–146)
SODIUM URINE: 21 MMOL/L
SOURCE, BLOOD GAS: ABNORMAL
SPECIFIC GRAVITY UA: 1.02 (ref 1–1.03)
TEAR DROP CELLS: ABNORMAL
THB: 8.5 G/DL (ref 11.5–16.5)
TIME ANALYZED: 443
TOTAL CK: 25 U/L (ref 20–180)
TROPONIN, HIGH SENSITIVITY: 93 NG/L (ref 0–9)
UROBILINOGEN, URINE: 0.2 E.U./DL
WBC # BLD: 9.9 E9/L (ref 4.5–11.5)
WBC UA: >20 /HPF (ref 0–5)

## 2023-01-18 PROCEDURE — 84145 PROCALCITONIN (PCT): CPT

## 2023-01-18 PROCEDURE — 82570 ASSAY OF URINE CREATININE: CPT

## 2023-01-18 PROCEDURE — 51798 US URINE CAPACITY MEASURE: CPT

## 2023-01-18 PROCEDURE — 6360000002 HC RX W HCPCS: Performed by: INTERNAL MEDICINE

## 2023-01-18 PROCEDURE — 2580000003 HC RX 258: Performed by: INTERNAL MEDICINE

## 2023-01-18 PROCEDURE — 94640 AIRWAY INHALATION TREATMENT: CPT

## 2023-01-18 PROCEDURE — 6360000002 HC RX W HCPCS: Performed by: NURSE PRACTITIONER

## 2023-01-18 PROCEDURE — 6370000000 HC RX 637 (ALT 250 FOR IP): Performed by: NURSE PRACTITIONER

## 2023-01-18 PROCEDURE — 36415 COLL VENOUS BLD VENIPUNCTURE: CPT

## 2023-01-18 PROCEDURE — 82550 ASSAY OF CK (CPK): CPT

## 2023-01-18 PROCEDURE — 80048 BASIC METABOLIC PNL TOTAL CA: CPT

## 2023-01-18 PROCEDURE — 2500000003 HC RX 250 WO HCPCS: Performed by: NURSE PRACTITIONER

## 2023-01-18 PROCEDURE — 99232 SBSQ HOSP IP/OBS MODERATE 35: CPT | Performed by: INTERNAL MEDICINE

## 2023-01-18 PROCEDURE — 94660 CPAP INITIATION&MGMT: CPT

## 2023-01-18 PROCEDURE — 82962 GLUCOSE BLOOD TEST: CPT

## 2023-01-18 PROCEDURE — 84300 ASSAY OF URINE SODIUM: CPT

## 2023-01-18 PROCEDURE — C8929 TTE W OR WO FOL WCON,DOPPLER: HCPCS

## 2023-01-18 PROCEDURE — 84133 ASSAY OF URINE POTASSIUM: CPT

## 2023-01-18 PROCEDURE — 6360000004 HC RX CONTRAST MEDICATION: Performed by: NURSE PRACTITIONER

## 2023-01-18 PROCEDURE — 2500000003 HC RX 250 WO HCPCS: Performed by: INTERNAL MEDICINE

## 2023-01-18 PROCEDURE — 74176 CT ABD & PELVIS W/O CONTRAST: CPT

## 2023-01-18 PROCEDURE — 6370000000 HC RX 637 (ALT 250 FOR IP): Performed by: INTERNAL MEDICINE

## 2023-01-18 PROCEDURE — 51702 INSERT TEMP BLADDER CATH: CPT

## 2023-01-18 PROCEDURE — 82805 BLOOD GASES W/O2 SATURATION: CPT

## 2023-01-18 PROCEDURE — 87077 CULTURE AEROBIC IDENTIFY: CPT

## 2023-01-18 PROCEDURE — 2580000003 HC RX 258: Performed by: NURSE PRACTITIONER

## 2023-01-18 PROCEDURE — 87186 SC STD MICRODIL/AGAR DIL: CPT

## 2023-01-18 PROCEDURE — 82436 ASSAY OF URINE CHLORIDE: CPT

## 2023-01-18 PROCEDURE — 84156 ASSAY OF PROTEIN URINE: CPT

## 2023-01-18 PROCEDURE — 85025 COMPLETE CBC W/AUTO DIFF WBC: CPT

## 2023-01-18 PROCEDURE — 84484 ASSAY OF TROPONIN QUANT: CPT

## 2023-01-18 PROCEDURE — 2000000000 HC ICU R&B

## 2023-01-18 PROCEDURE — 84100 ASSAY OF PHOSPHORUS: CPT

## 2023-01-18 PROCEDURE — 87088 URINE BACTERIA CULTURE: CPT

## 2023-01-18 PROCEDURE — 36600 WITHDRAWAL OF ARTERIAL BLOOD: CPT

## 2023-01-18 PROCEDURE — 81001 URINALYSIS AUTO W/SCOPE: CPT

## 2023-01-18 RX ORDER — LORAZEPAM 0.5 MG/1
0.5 TABLET ORAL NIGHTLY
Status: DISCONTINUED | OUTPATIENT
Start: 2023-01-18 | End: 2023-02-24 | Stop reason: HOSPADM

## 2023-01-18 RX ORDER — DEXTROSE MONOHYDRATE 100 MG/ML
INJECTION, SOLUTION INTRAVENOUS CONTINUOUS PRN
Status: DISCONTINUED | OUTPATIENT
Start: 2023-01-18 | End: 2023-01-18 | Stop reason: SDUPTHER

## 2023-01-18 RX ORDER — BUDESONIDE 0.5 MG/2ML
0.5 INHALANT ORAL 2 TIMES DAILY
Status: DISCONTINUED | OUTPATIENT
Start: 2023-01-18 | End: 2023-02-24 | Stop reason: HOSPADM

## 2023-01-18 RX ORDER — MIDODRINE HYDROCHLORIDE 5 MG/1
5 TABLET ORAL
Status: DISCONTINUED | OUTPATIENT
Start: 2023-01-18 | End: 2023-01-18 | Stop reason: SDUPTHER

## 2023-01-18 RX ORDER — CARBIDOPA AND LEVODOPA 25; 100 MG/1; MG/1
2 TABLET, EXTENDED RELEASE ORAL 3 TIMES DAILY
Status: DISCONTINUED | OUTPATIENT
Start: 2023-01-18 | End: 2023-01-18

## 2023-01-18 RX ORDER — IPRATROPIUM BROMIDE AND ALBUTEROL SULFATE 2.5; .5 MG/3ML; MG/3ML
1 SOLUTION RESPIRATORY (INHALATION) 4 TIMES DAILY
Status: DISCONTINUED | OUTPATIENT
Start: 2023-01-18 | End: 2023-02-17 | Stop reason: CLARIF

## 2023-01-18 RX ORDER — DILTIAZEM HYDROCHLORIDE 5 MG/ML
20 INJECTION INTRAVENOUS ONCE
Status: COMPLETED | OUTPATIENT
Start: 2023-01-18 | End: 2023-01-18

## 2023-01-18 RX ORDER — BUDESONIDE AND FORMOTEROL FUMARATE DIHYDRATE 160; 4.5 UG/1; UG/1
2 AEROSOL RESPIRATORY (INHALATION) 2 TIMES DAILY
Status: DISCONTINUED | OUTPATIENT
Start: 2023-01-18 | End: 2023-01-18 | Stop reason: CLARIF

## 2023-01-18 RX ORDER — 0.9 % SODIUM CHLORIDE 0.9 %
500 INTRAVENOUS SOLUTION INTRAVENOUS ONCE
Status: COMPLETED | OUTPATIENT
Start: 2023-01-18 | End: 2023-01-18

## 2023-01-18 RX ORDER — METOPROLOL SUCCINATE 25 MG/1
25 TABLET, EXTENDED RELEASE ORAL 2 TIMES DAILY
Status: DISCONTINUED | OUTPATIENT
Start: 2023-01-18 | End: 2023-02-05

## 2023-01-18 RX ORDER — 0.9 % SODIUM CHLORIDE 0.9 %
500 INTRAVENOUS SOLUTION INTRAVENOUS ONCE
Status: DISCONTINUED | OUTPATIENT
Start: 2023-01-18 | End: 2023-01-18

## 2023-01-18 RX ORDER — ASPIRIN 81 MG/1
81 TABLET ORAL DAILY
Status: DISCONTINUED | OUTPATIENT
Start: 2023-01-18 | End: 2023-01-18

## 2023-01-18 RX ORDER — MONTELUKAST SODIUM 10 MG/1
10 TABLET ORAL NIGHTLY
Status: DISCONTINUED | OUTPATIENT
Start: 2023-01-18 | End: 2023-01-18 | Stop reason: SDUPTHER

## 2023-01-18 RX ORDER — METOPROLOL TARTRATE 5 MG/5ML
2.5 INJECTION INTRAVENOUS ONCE
Status: COMPLETED | OUTPATIENT
Start: 2023-01-18 | End: 2023-01-18

## 2023-01-18 RX ORDER — ATORVASTATIN CALCIUM 10 MG/1
20 TABLET, FILM COATED ORAL NIGHTLY
Status: DISCONTINUED | OUTPATIENT
Start: 2023-01-18 | End: 2023-02-24 | Stop reason: HOSPADM

## 2023-01-18 RX ORDER — ROPINIROLE 1 MG/1
1 TABLET, FILM COATED ORAL 3 TIMES DAILY
Status: DISCONTINUED | OUTPATIENT
Start: 2023-01-18 | End: 2023-02-24 | Stop reason: HOSPADM

## 2023-01-18 RX ORDER — INSULIN GLARGINE 100 [IU]/ML
13 INJECTION, SOLUTION SUBCUTANEOUS 2 TIMES DAILY
Status: DISCONTINUED | OUTPATIENT
Start: 2023-01-18 | End: 2023-02-24 | Stop reason: HOSPADM

## 2023-01-18 RX ORDER — INSULIN LISPRO 100 [IU]/ML
0-4 INJECTION, SOLUTION INTRAVENOUS; SUBCUTANEOUS
Status: DISCONTINUED | OUTPATIENT
Start: 2023-01-18 | End: 2023-02-24 | Stop reason: HOSPADM

## 2023-01-18 RX ORDER — SUCRALFATE 1 G/1
1 TABLET ORAL 4 TIMES DAILY
Status: DISCONTINUED | OUTPATIENT
Start: 2023-01-18 | End: 2023-02-24 | Stop reason: HOSPADM

## 2023-01-18 RX ORDER — METOPROLOL TARTRATE 5 MG/5ML
5 INJECTION INTRAVENOUS ONCE
Status: COMPLETED | OUTPATIENT
Start: 2023-01-18 | End: 2023-01-18

## 2023-01-18 RX ORDER — LOPERAMIDE HYDROCHLORIDE 2 MG/1
2 CAPSULE ORAL 4 TIMES DAILY PRN
Status: DISCONTINUED | OUTPATIENT
Start: 2023-01-18 | End: 2023-02-24 | Stop reason: HOSPADM

## 2023-01-18 RX ORDER — INSULIN LISPRO 100 [IU]/ML
0-4 INJECTION, SOLUTION INTRAVENOUS; SUBCUTANEOUS NIGHTLY
Status: DISCONTINUED | OUTPATIENT
Start: 2023-01-18 | End: 2023-02-24 | Stop reason: HOSPADM

## 2023-01-18 RX ORDER — BUMETANIDE 1 MG/1
2 TABLET ORAL DAILY
Status: DISCONTINUED | OUTPATIENT
Start: 2023-01-18 | End: 2023-01-22

## 2023-01-18 RX ORDER — METOPROLOL SUCCINATE 25 MG/1
25 TABLET, EXTENDED RELEASE ORAL 2 TIMES DAILY
Status: DISCONTINUED | OUTPATIENT
Start: 2023-01-18 | End: 2023-01-18

## 2023-01-18 RX ADMIN — DILTIAZEM HYDROCHLORIDE 5 MG/HR: 5 INJECTION INTRAVENOUS at 19:40

## 2023-01-18 RX ADMIN — BUDESONIDE 500 MCG: 0.5 SUSPENSION RESPIRATORY (INHALATION) at 17:36

## 2023-01-18 RX ADMIN — IPRATROPIUM BROMIDE AND ALBUTEROL SULFATE 3 ML: .5; 2.5 SOLUTION RESPIRATORY (INHALATION) at 14:23

## 2023-01-18 RX ADMIN — INSULIN GLARGINE 13 UNITS: 100 INJECTION, SOLUTION SUBCUTANEOUS at 09:29

## 2023-01-18 RX ADMIN — SUCRALFATE 1 G: 1 TABLET ORAL at 12:17

## 2023-01-18 RX ADMIN — CARBIDOPA AND LEVODOPA 1 TABLET: 50; 200 TABLET, EXTENDED RELEASE ORAL at 14:53

## 2023-01-18 RX ADMIN — INSULIN LISPRO 4 UNITS: 100 INJECTION, SOLUTION INTRAVENOUS; SUBCUTANEOUS at 21:58

## 2023-01-18 RX ADMIN — IPRATROPIUM BROMIDE AND ALBUTEROL SULFATE 3 ML: .5; 2.5 SOLUTION RESPIRATORY (INHALATION) at 08:59

## 2023-01-18 RX ADMIN — SUCRALFATE 1 G: 1 TABLET ORAL at 21:55

## 2023-01-18 RX ADMIN — INSULIN LISPRO 3 UNITS: 100 INJECTION, SOLUTION INTRAVENOUS; SUBCUTANEOUS at 11:48

## 2023-01-18 RX ADMIN — METOPROLOL SUCCINATE 25 MG: 25 TABLET, FILM COATED, EXTENDED RELEASE ORAL at 09:56

## 2023-01-18 RX ADMIN — ROPINIROLE HYDROCHLORIDE 1 MG: 1 TABLET, FILM COATED ORAL at 13:50

## 2023-01-18 RX ADMIN — SUCRALFATE 1 G: 1 TABLET ORAL at 09:29

## 2023-01-18 RX ADMIN — CARBIDOPA AND LEVODOPA 1 TABLET: 50; 200 TABLET, EXTENDED RELEASE ORAL at 09:29

## 2023-01-18 RX ADMIN — MONTELUKAST SODIUM 10 MG: 10 TABLET, FILM COATED ORAL at 21:57

## 2023-01-18 RX ADMIN — ANTI-FUNGAL POWDER MICONAZOLE NITRATE TALC FREE: 1.42 POWDER TOPICAL at 21:57

## 2023-01-18 RX ADMIN — DILTIAZEM HYDROCHLORIDE 20 MG: 5 INJECTION, SOLUTION INTRAVENOUS at 19:35

## 2023-01-18 RX ADMIN — Medication 10 ML: at 09:57

## 2023-01-18 RX ADMIN — SODIUM CHLORIDE: 9 INJECTION, SOLUTION INTRAVENOUS at 09:38

## 2023-01-18 RX ADMIN — Medication 10 ML: at 21:58

## 2023-01-18 RX ADMIN — MIRTAZAPINE 7.5 MG: 15 TABLET, FILM COATED ORAL at 21:56

## 2023-01-18 RX ADMIN — SERTRALINE 50 MG: 50 TABLET, FILM COATED ORAL at 09:29

## 2023-01-18 RX ADMIN — ARFORMOTEROL TARTRATE 15 MCG: 15 SOLUTION RESPIRATORY (INHALATION) at 17:36

## 2023-01-18 RX ADMIN — SUCRALFATE 1 G: 1 TABLET ORAL at 16:52

## 2023-01-18 RX ADMIN — ROPINIROLE HYDROCHLORIDE 1 MG: 1 TABLET, FILM COATED ORAL at 09:28

## 2023-01-18 RX ADMIN — PERFLUTREN 1.5 ML: 6.52 INJECTION, SUSPENSION INTRAVENOUS at 09:49

## 2023-01-18 RX ADMIN — SODIUM ZIRCONIUM CYCLOSILICATE 10 G: 10 POWDER, FOR SUSPENSION ORAL at 09:29

## 2023-01-18 RX ADMIN — CEFTRIAXONE 1000 MG: 1 INJECTION, POWDER, FOR SOLUTION INTRAMUSCULAR; INTRAVENOUS at 21:57

## 2023-01-18 RX ADMIN — IPRATROPIUM BROMIDE AND ALBUTEROL SULFATE 3 ML: .5; 2.5 SOLUTION RESPIRATORY (INHALATION) at 17:35

## 2023-01-18 RX ADMIN — CARBIDOPA AND LEVODOPA 1 TABLET: 50; 200 TABLET, EXTENDED RELEASE ORAL at 21:56

## 2023-01-18 RX ADMIN — INSULIN GLARGINE 13 UNITS: 100 INJECTION, SOLUTION SUBCUTANEOUS at 21:59

## 2023-01-18 RX ADMIN — ROPINIROLE HYDROCHLORIDE 1 MG: 1 TABLET, FILM COATED ORAL at 21:57

## 2023-01-18 RX ADMIN — METOPROLOL TARTRATE 2.5 MG: 5 INJECTION, SOLUTION INTRAVENOUS at 10:25

## 2023-01-18 RX ADMIN — PREDNISONE 40 MG: 20 TABLET ORAL at 09:28

## 2023-01-18 RX ADMIN — APIXABAN 5 MG: 5 TABLET, FILM COATED ORAL at 21:55

## 2023-01-18 RX ADMIN — METOPROLOL SUCCINATE 25 MG: 25 TABLET, FILM COATED, EXTENDED RELEASE ORAL at 21:55

## 2023-01-18 RX ADMIN — SODIUM CHLORIDE: 9 INJECTION, SOLUTION INTRAVENOUS at 22:18

## 2023-01-18 RX ADMIN — LORAZEPAM 0.5 MG: 0.5 TABLET ORAL at 21:55

## 2023-01-18 RX ADMIN — ATORVASTATIN CALCIUM 20 MG: 20 TABLET, FILM COATED ORAL at 21:55

## 2023-01-18 RX ADMIN — BUDESONIDE 500 MCG: 0.5 SUSPENSION RESPIRATORY (INHALATION) at 04:47

## 2023-01-18 RX ADMIN — ARFORMOTEROL TARTRATE 15 MCG: 15 SOLUTION RESPIRATORY (INHALATION) at 04:47

## 2023-01-18 RX ADMIN — SODIUM CHLORIDE 500 ML: 9 INJECTION, SOLUTION INTRAVENOUS at 05:08

## 2023-01-18 RX ADMIN — INSULIN LISPRO 3 UNITS: 100 INJECTION, SOLUTION INTRAVENOUS; SUBCUTANEOUS at 16:52

## 2023-01-18 RX ADMIN — METOPROLOL TARTRATE 5 MG: 5 INJECTION INTRAVENOUS at 15:16

## 2023-01-18 RX ADMIN — DOXYCYCLINE 100 MG: 100 INJECTION, POWDER, LYOPHILIZED, FOR SOLUTION INTRAVENOUS at 16:56

## 2023-01-18 RX ADMIN — APIXABAN 5 MG: 5 TABLET, FILM COATED ORAL at 09:29

## 2023-01-18 RX ADMIN — DOXYCYCLINE 100 MG: 100 INJECTION, POWDER, LYOPHILIZED, FOR SOLUTION INTRAVENOUS at 06:17

## 2023-01-18 RX ADMIN — ASPIRIN 81 MG: 81 TABLET, COATED ORAL at 09:29

## 2023-01-18 RX ADMIN — IPRATROPIUM BROMIDE AND ALBUTEROL SULFATE 3 ML: .5; 2.5 SOLUTION RESPIRATORY (INHALATION) at 04:47

## 2023-01-18 RX ADMIN — MONTELUKAST SODIUM 10 MG: 10 TABLET, FILM COATED ORAL at 00:08

## 2023-01-18 RX ADMIN — ANTI-FUNGAL POWDER MICONAZOLE NITRATE TALC FREE: 1.42 POWDER TOPICAL at 09:36

## 2023-01-18 ASSESSMENT — PAIN SCALES - GENERAL
PAINLEVEL_OUTOF10: 0
PAINLEVEL_OUTOF10: 0

## 2023-01-18 NOTE — ACP (ADVANCE CARE PLANNING)
Advance Care Planning   Healthcare Decision Maker:    Primary Decision Maker: Nghia Sharma Child - [phone]    Primary Decision Maker: Dipti Lau Child - [phone]    Primary Decision Maker: Patricia miller - Child - [phone]  Today we documented Decision Maker(s) consistent with Legal Next of Kin hierarchy.       Electronically signed by GI Flower on 1/18/2023 at 7:19 AM

## 2023-01-18 NOTE — ED NOTES
Dr. Patricia Nicole at bedside speaking to pt and family.       [facility]  01/17/23 1950

## 2023-01-18 NOTE — CARE COORDINATION
Case Management Assessment  Initial Evaluation    Date/Time of Evaluation: 1/18/2023 10:46 AM  Assessment Completed by: Talat Rodriguez RN    If patient is discharged prior to next notation, then this note serves as note for discharge by case management. Patient Name: Conor Tracey                   YOB: 1950  Diagnosis: NSTEMI (non-ST elevated myocardial infarction) [facility]) [I21.4]  Atrial fibrillation with rapid ventricular response (HCC) [I48.91]  Recurrent right pleural effusion [J90]  Hypotension, unspecified hypotension type [I95.9]  Acute on chronic respiratory failure with hypoxia and hypercapnia ([facility] [address] [F50.80, J96.22]                   Date / Time: 1/17/2023  5:16 PM    Patient Admission Status: Inpatient   Readmission Risk (Low < 19, Mod (19-27), High > 27): Readmission Risk Score: 41.1    Current PCP: Mychal Randolph MD  PCP verified by CM?  Yes    Chart Reviewed: Yes      History Provided by: Patient, Child/Family  Patient Orientation: Alert and Oriented    Patient Cognition: Alert    Hospitalization in the last 30 days (Readmission):  Yes    Readmission Assessment  Number of Days since last admission?: 8-30 days  [facility]  Who is being Interviewed: Caregiver, Patient Waldo Forrester over the phone)  What was the patient's/caregiver's perception as to why they think they needed to return back to the hospital?:  (SOB, CHF bad, lethargic)  Did you visit your Primary Care Physician after you left the hospital, before you returned this time?: Yes (at [facility] Sabrina Garcia)  Did you see a specialist, such as Cardiac, Pulmonary, Orthopedic Physician, etc. after you left the hospital?: No  Who advised the patient to return to the hospital?: Self-referral, Caregiver  Does the patient report anything that got in the way of taking their medications?: No  In our efforts to provide the best possible care to you and others like you, can you think of anything that we could have done to help you after you left the hospital the first time, so that you might not have needed to return so soon?: Other (Comment) pt from snf with plans to return      Advance Directives:      Code Status: Full Code   Patient's Primary Decision Maker is: Legal Next of Kin    Primary Decision MakerIver Karen Child - [phone]    Primary Decision Maker: Annabelle Rhodes Child - [phone]    Primary Decision Maker: Patricia miller - Child - [phone]    Discharge Planning:    Patient lives with: Other (Comment) (community skilled) Type of Home: Long-Term Acute Care  Primary Care Giver: Other (Comment) (SNF)  Patient Support Systems include: Children, Family Members, Other (Comment) (SNF)   Current Financial resources: Medicaid, Medicare  Current community resources: ECF/Home Care  Current services prior to admission: Home Bipap, [address]            Current DME:              Type of Home Care services:  None    ADLS  Prior functional level: Assistance with the following:, Bathing, Dressing, Toileting, Feeding, Cooking, Housework, Shopping, Mobility  Current functional level: Assistance with the following:, Bathing, Dressing, Toileting, Feeding    Family can provide assistance at DC: Yes  Would you like Case Management to discuss the discharge plan with any other family members/significant others, and if so, who?  Yes (daughter Aileen Ro)  Plans to Return to Present Housing: Yes  Other Identified Issues/Barriers to RETURNING to current housing: yes  Potential Assistance needed at discharge: [address], [place] Sheehanuel            Potential DME:    Patient expects to discharge to: Long-term care  Plan for transportation at discharge:  abdon Bailey: Must arrange through [address] [place] [address] at discharge [address] [place] [address]: [phone]    Financial    Payor: Dana Torres / Plan: Catalino Amaya / Product Type: *No Product type* /     Potential assistance Purchasing Medications: No  Meds-to-Beds request:  [place] [facility] Mail Kenroy Phipps [phone] Otis Diego [phone]  [address] [facility] [address]  Phone: [phone] Fax: [phone] [address], [place] - [address] - F [phone]  [address] [address]  Phone: [phone] Fax: [phone]      Notes:    Factors facilitating achievement of predicted outcomes: Family support    Barriers to discharge: Cognitive deficit at times    Additional Case Management Notes:  1/18/23 ICU, bipap versus nc oxygen. Tachycardia 131. Pt very [place]- has no hearing aides. Pt from [facility]. She is either in bed or in wc most of the time. If return skilled would need a precert per [place], however, plans for LTC return- NO PRECERT, needs signed [place] at discharge an rapid covid at discharge. Pt's insurance requires  [place] transport be arranged through [address] [place] [address] at discharge [address]: [phone]. CM following.  jasonk     The Plan for Transition of Care is related to the following treatment goals of NSTEMI (non-ST elevated myocardial infarction) ([facility] [address] [I21.4]  Atrial fibrillation with rapid ventricular response (HCC) [I48.91]  Recurrent right pleural effusion [J90]  Hypotension, unspecified hypotension type [I95.9]  Acute on chronic respiratory failure with hypoxia and hypercapnia ([facility] [address] [D78.60, J96.22]      Gilbert Cho RN  Case Management Department  Ph: [phone]

## 2023-01-18 NOTE — PLAN OF CARE
Problem: Discharge Planning  Goal: Discharge to home or other facility with appropriate resources  Outcome: Progressing     Problem: Respiratory - Adult  Goal: Achieves optimal ventilation and oxygenation  1/18/2023 1124 by Luisana Whiteside RN  Outcome: Progressing  1/18/2023 0421 by Yoon Baca RN  Outcome: Progressing     Problem: Cardiovascular - Adult  Goal: Maintains optimal cardiac output and hemodynamic stability  Outcome: Progressing  Goal: Absence of cardiac dysrhythmias or at baseline  Outcome: Progressing     Problem: Genitourinary - Adult  Goal: Absence of urinary retention  Outcome: Progressing  Goal: Urinary catheter remains patent  1/18/2023 1124 by Luisana Whiteside RN  Outcome: Progressing  1/18/2023 0421 by Yoon Baca RN  Outcome: Progressing     Problem: Chronic Conditions and Co-morbidities  Goal: Patient's chronic conditions and co-morbidity symptoms are monitored and maintained or improved  Outcome: Progressing     Problem: Skin/Tissue Integrity  Goal: Absence of new skin breakdown  Description: 1. Monitor for areas of redness and/or skin breakdown  2. Assess vascular access sites hourly  3. Every 4-6 hours minimum:  Change oxygen saturation probe site  4. Every 4-6 hours:  If on nasal continuous positive airway pressure, respiratory therapy assess nares and determine need for appliance change or resting period.   Outcome: Progressing     Problem: Safety - Adult  Goal: Free from fall injury  Outcome: Progressing  Flowsheets (Taken 1/18/2023 1122)  Free From Fall Injury: Instruct family/caregiver on patient safety     Problem: ABCDS Injury Assessment  Goal: Absence of physical injury  Outcome: Progressing

## 2023-01-18 NOTE — PLAN OF CARE
Problem: Respiratory - Adult  Goal: Achieves optimal ventilation and oxygenation  Outcome: Progressing     Problem: Genitourinary - Adult  Goal: Urinary catheter remains patent  Outcome: Progressing

## 2023-01-18 NOTE — H&P
[address] [place], [place] [address]ist Group   HISTORY AND PHYSICAL EXAM      AUTHOR: Karel Padilla MD PATIENT NAME: Ap Lutz   PCP: No primary care provider on file. MRN: [id number], : 1950       CHIEF COMPLAINT / REASON FOR ADMISSION: SOB, hypotension  HPI:   This is a 67 y.o. female  has a past medical history of A-fib ([facility] [address] Acute on chronic congestive heart failure ([facility] [address] Anxiety, Asthma, CAD (coronary artery disease), Cancer ([facility] [address] Chronic kidney disease, COPD exacerbation ([facility] [address] Depression, Diabetes mellitus ([facility] [address] H/O mammogram, Hx MRSA infection, Hyperlipidemia, Hypertension, Lateral epicondylitis, Morbid obesity ([facility] [address] SERENA on CPAP, Oxygen dependent, Parkinson's disease ([facility] [address] and Tubal ligation status. presented with SOB, hypotension  for last few days prior to arrival to the hospital. SOB is associated with some cough, nonproductive but no fever or chills reported. Initially SOB was mild, intermittent but gradually getting more persistent. Started gradually. Exacerbated by general activity or exertion. Relieved only partially by rest. In ED patient was found to be hypotensive with Afib RVR and respiratory failure and was placed on BIPAP. BP improved after midodrine dose. The patient was seen and examined at bedside, appears alert and awake with no acute distress and is able to answer simple  questions. On direct questioning, patient denied any  resting ongoing chest pain, hemoptysis, productive cough, fever, ongoing palpitation, active abdominal pain, hematemesis, rectal bleeding, blessing, hematuria, any other  and GI complaints, and any new focal neuro deficits .     ROS:  Pertinent positives and negatives are noted in the HPI, all other systems are reviewed and negative    PMH:  Past Medical History:   Diagnosis Date    A-fib ([facility] [address]     Acute on chronic congestive heart failure (HCC)     Anxiety     Asthma     CAD (coronary artery disease) 2016    Cancer ([facility] [address]  breast ca  right lumpectomy    Chronic kidney disease     nephrolithiasis    COPD exacerbation ([facility] [address] 10/12/2022    Depression     Diabetes mellitus ([facility] [address]     H/O mammogram     Hx MRSA infection     toe infection january 2012    Hyperlipidemia     Hypertension     Lateral epicondylitis     Morbid obesity (HCC)     SERENA on CPAP     Oxygen dependent     Parkinson's disease ([facility] [address]     Tubal ligation status        Surgical History:  Past Surgical History:   Procedure Laterality Date    BREAST LUMPECTOMY      BREAST REDUCTION SURGERY      CARDIAC CATHETERIZATION  4/28/2014    Dr. Cora Hogan  01/11/2022    Dr. Nieves Delaney  7/29/15    CT GUIDED CHEST TUBE  7/8/2022    CT GUIDED CHEST TUBE 7/8/2022 Toño Wood MD [facility]    ENDOSCOPY, COLON, DIAGNOSTIC  7/19/15    GALLBLADDER SURGERY      LUMBAR LAMINECTOMY      TOE AMPUTATION      TONSILLECTOMY      UPPER GASTROINTESTINAL ENDOSCOPY      UPPER GASTROINTESTINAL ENDOSCOPY N/A 7/19/2022    EGD ESOPHAGOGASTRODUODENOSCOPY performed by Collette Hobbs MD at [address] [place] [address]       Medications Prior to Admission:    Prior to Admission medications    Medication Sig Start Date End Date Taking? Authorizing Provider   metoprolol succinate (TOPROL XL) 25 MG extended release tablet Take 25 mg by mouth in the morning and at bedtime   Yes Historical Provider, MD   cephALEXin (KEFLEX) 500 MG capsule Take 1 capsule by mouth 4 times daily for 10 days 1/16/23 1/26/23  Alecia Call DO   doxycycline hyclate (VIBRA-TABS) 100 MG tablet Take 1 tablet by mouth 2 times daily for 10 days 1/16/23 1/26/23  Alecia Call DO   BiPAP Machine MISC by Does not apply route Nightly.  12/6 30% PER NURSING HOME PAPERWORK    Historical Provider, MD   glucagon, rDNA, 1 MG injection Inject 1 mg into the muscle as needed for Low blood sugar    Historical Provider, MD   Glucose (TRUEPLUS) 15 GM/32ML GEL Take 15 g by mouth as needed    Historical Provider, MD   hydrOXYzine pamoate (VISTARIL) 25 MG capsule Take 25 mg by mouth 3 times daily as needed for Itching    Historical Provider, MD   acetaminophen (TYLENOL) 325 MG tablet Take 650 mg by mouth every 4 hours as needed for Pain    Historical Provider, MD   apixaban (ELIQUIS) 5 MG TABS tablet Take 1 tablet by mouth 2 times daily 12/24/22   Gabby Casillas,    bumetanide (BUMEX) 2 MG tablet Take 1 tablet by mouth daily 12/25/22   Gabby Casillas,    midodrine (PROAMATINE) 5 MG tablet Take 1 tablet by mouth 3 times daily (with meals) 12/25/22   Gabby Casillas,    LORazepam (ATIVAN) 0.5 MG tablet Take 0.5 mg by mouth nightly.     Historical Provider, MD   insulin lispro (HUMALOG) 100 UNIT/ML injection vial Inject into the skin 3 times daily (before meals) Sliding scale  200-249=2 UNITS  250-299=4 UNITS  300-349=6 UNITS  350-399=8 UNITS  400-500=10 UNITS    Historical Provider, MD   sertraline (ZOLOFT) 50 MG tablet Take 50 mg by mouth daily    Historical Provider, MD   loperamide (IMODIUM) 2 MG capsule Take 2 mg by mouth 4 times daily as needed for Diarrhea    Historical Provider, MD   OXYGEN Inhale 3 L into the lungs continuous    Historical Provider, MD   docusate sodium (COLACE, DULCOLAX) 100 MG CAPS Take 100 mg by mouth 2 times daily as needed for Constipation 9/8/22   Brittany Liao MD   insulin glargine (LANTUS) 100 UNIT/ML injection vial Inject 13 Units into the skin 2 times daily 9/8/22   Brittany Liao MD   atorvastatin (LIPITOR) 20 MG tablet Take 20 mg by mouth nightly    Historical Provider, MD   benzoin compound solution Apply 1 Applicatorful topically nightly Apply topically to right toe  Patient not taking: No sig reported    Historical Provider, MD   ipratropium-albuterol (DUONEB) 0.5-2.5 (3) MG/3ML SOLN nebulizer solution Inhale 1 vial into the lungs 4 times daily    Historical Provider, MD   miconazole (MICOTIN) 2 % powder Apply 1 each topically 2 times daily Apply topically under breasts twice daily Historical Provider, MD   pantoprazole (PROTONIX) 40 MG tablet Take 40 mg by mouth every morning    Historical Provider, MD   mirtazapine (REMERON) 15 MG tablet Take 7.5 mg by mouth nightly    Historical Provider, MD   budesonide-formoterol (SYMBICORT) 160-4.5 MCG/ACT AERO Inhale 2 puffs into the lungs 2 times daily    Historical Provider, MD   metoprolol tartrate (LOPRESSOR) 25 MG tablet Take 0.5 tablets by mouth in the morning and 0.5 tablets before bedtime. 7/27/22 9/8/22  Carolyn Spain MD   carbidopa-levodopa (SINEMET CR)  MG per extended release tablet Take 2 tablets by mouth in the morning and 2 tablets at noon and 2 tablets in the evening. 7/23/22   Carolyn Spain MD   rOPINIRole (REQUIP) 1 MG tablet Take 1 tablet by mouth in the morning and 1 tablet at noon and 1 tablet before bedtime. 7/23/22   Carolyn Spain MD   amiodarone (CORDARONE) 200 MG tablet Take 1 tablet by mouth in the morning. 7/24/22 9/8/22  Carolyn Spain MD   budesonide (PULMICORT) 0.5 MG/2ML nebulizer suspension Take 2 mLs by nebulization in the morning and 2 mLs in the evening. 7/23/22 9/8/22  Carolyn Spain MD   insulin glargine-[facility]) 100 UNIT/ML injection vial Inject 5 Units into the skin nightly 7/23/22 9/8/22  Carolyn Spain MD   QUEtiapine (SEROQUEL) 25 MG tablet Take 1 tablet by mouth in the morning and 1 tablet before bedtime. 7/23/22 9/8/22  Carolyn Spain MD   sucralfate (CARAFATE) 1 GM tablet Take 1 tablet by mouth in the morning and 1 tablet at noon and 1 tablet in the evening and 1 tablet before bedtime.  7/20/22   Leyla Gaines DO   divalproex (DEPAKOTE) 250 MG DR tablet Take 250 mg by mouth 2 times daily  7/23/22  Historical Provider, MD   ferrous sulfate (IRON 325) 325 (65 Fe) MG tablet Take 325 mg by mouth daily (with breakfast)  Patient not taking: Reported on 7/7/2022 7/23/22  Historical Provider, MD   aspirin EC 81 MG EC tablet Take 1 tablet by mouth daily 5/17/22   Hunter Miranda MD   montelukast (SINGULAIR) 10 MG tablet Take 10 mg by mouth nightly    Historical Provider, MD       Allergies:    Ciprofloxacin, Codeine, and Digoxin and related    Social History:    reports that she has never smoked. She has never used smokeless tobacco. She reports that she does not drink alcohol and does not use drugs. Family History:   family history includes Cancer in her father and mother; Diabetes in her sister; Heart Disease in her sister; Other in her brother; Seizures in her son. PHYSICAL EXAM:  Vitals:  BP (!) 85/44   Pulse (!) 104   Temp 97.5 °F (36.4 °C) (Bladder)   Resp 22   Ht 5' (1.524 m)   Wt 262 lb 1.6 oz (118.9 kg)   SpO2 98%   BMI 51.19 kg/m²   GENERAL: No acute distress, Alert and awake, Afebrile, Appears tired and weak otherwise hemodynamically stable at present. HEENT: PERRLA, no icterus. OP clear and no exudates. NECK: Supple  no carotid/ophthalmic bruits, JVD ++. RESPIRATORY:  Bilateral equal vesicular breath sound with diffuse wheezing. Lung bases are clear. HEART: Has tachycardia at bedside and irregular rhythm. Normal S1 and S2, No S3 or S4 is audible. No pulsation, thrills, murmur or friction rubs. ABDOMEN: Soft, nondistended, nontender. No hepatomegaly or splenomegaly. No CVA tenderness on the both sides. Bowel sound is present. EXTREMITIES: All peripheral pulses are present. No calf tenderness or swelling. ++ pedal edema is present. Leslie Dutton NEUROLOGY: Alert and awake. No new focal neuro deficit. Bilateral Pupil is equal and reactive to light. CN-ii-xii otherwise grossly intact.  Motor and Sensory: Grossly Intact bilaterally with no new focal signs    LABS:  Recent Labs     01/16/23  1022 01/17/23  1723 01/18/23  0415   WBC 8.9 11.0 9.9   RBC 3.43* 3.28* 2.84*   HGB 8.7* 8.1* 7.3*   HCT 32.6* 31.1* 26.5*   MCV 95.0 94.8 93.3   MCH 25.4* 24.7* 25.7*   MCHC 26.7* 26.0* 27.5*   RDW 16.5* 16.2* 16.0*    195 174   MPV 10.7 10.4 10.3     Recent Labs     01/16/23  1022 01/17/23  1723 01/18/23  0415    141 141   K 4.5 4.8 5.2*   CL 93* 94* 97*   CO2 39* 37* 39*   BUN 38* 44* 47*   CREATININE 2.1* 2.1* 2.2*   GLUCOSE 79 94 147*   CALCIUM 9.1 9.3 8.3*     No results for input(s): POCGLU in the last 72 hours.   Results for orders placed or performed during the hospital encounter of 01/17/23   COVID-19, Rapid    Specimen: Nasopharyngeal Swab   Result Value Ref Range    SARS-CoV-2, NAAT Not Detected Not Detected   RAPID INFLUENZA A/B ANTIGENS    Specimen: Nasopharyngeal   Result Value Ref Range    Influenza A by PCR Not Detected Not Detected    Influenza B by PCR Not Detected Not Detected   Lactate, Sepsis   Result Value Ref Range    Lactic Acid, Sepsis 1.7 0.5 - 1.9 mmol/L   CBC with Auto Differential   Result Value Ref Range    WBC 11.0 4.5 - 11.5 E9/L    RBC 3.28 (L) 3.50 - 5.50 E12/L    Hemoglobin 8.1 (L) 11.5 - 15.5 g/dL    Hematocrit 31.1 (L) 34.0 - 48.0 %    MCV 94.8 80.0 - 99.9 fL    MCH 24.7 (L) 26.0 - 35.0 pg    MCHC 26.0 (L) 32.0 - 34.5 %    RDW 16.2 (H) 11.5 - 15.0 fL    Platelets 093 262 - 857 E9/L    MPV 10.4 7.0 - 12.0 fL    Neutrophils % 85.1 (H) 43.0 - 80.0 %    Lymphocytes % 8.8 (L) 20.0 - 42.0 %    Monocytes % 6.1 2.0 - 12.0 %    Eosinophils % 0.2 0.0 - 6.0 %    Basophils % 0.3 0.0 - 2.0 %    Neutrophils Absolute 9.35 (H) 1.80 - 7.30 E9/L    Lymphocytes Absolute 0.99 (L) 1.50 - 4.00 E9/L    Monocytes Absolute 0.66 0.10 - 0.95 E9/L    Eosinophils Absolute 0.00 (L) 0.05 - 0.50 E9/L    Basophils Absolute 0.00 0.00 - 0.20 E9/L    Anisocytosis 1+     Polychromasia 1+     Hypochromia 1+     Poikilocytes 1+     Ovalocytes 1+     Stomatocytes 1+     Tear Drop Cells 1+     Basophilic Stippling 1+    Basic Metabolic Panel   Result Value Ref Range    Sodium 141 132 - 146 mmol/L    Potassium 4.8 3.5 - 5.0 mmol/L    Chloride 94 (L) 98 - 107 mmol/L    CO2 37 (H) 22 - 29 mmol/L    Anion Gap 10 7 - 16 mmol/L    Glucose 94 74 - 99 mg/dL BUN 44 (H) 6 - 23 mg/dL    Creatinine 2.1 (H) 0.5 - 1.0 mg/dL    Est, Glom Filt Rate 24 >=60 mL/min/1.73    Calcium 9.3 8.6 - 10.2 mg/dL   Hepatic Function Panel   Result Value Ref Range    Total Protein 6.1 (L) 6.4 - 8.3 g/dL    Albumin 3.6 3.5 - 5.2 g/dL    Alkaline Phosphatase 98 35 - 104 U/L    ALT <5 0 - 32 U/L    AST 9 0 - 31 U/L    Total Bilirubin 0.5 0.0 - 1.2 mg/dL    Bilirubin, Direct 0.3 0.0 - 0.3 mg/dL    Bilirubin, Indirect 0.2 0.0 - 1.0 mg/dL   Troponin   Result Value Ref Range    Troponin, High Sensitivity 117 (H) 0 - 9 ng/L   Brain Natriuretic Peptide   Result Value Ref Range    Pro-BNP 21,273 (H) 0 - 125 pg/mL   Blood Gas, Arterial   Result Value Ref Range    Date Analyzed 20230117     Time Analyzed 1752     Source: Blood Arterial     pH, Blood Gas 7.260 (L) 7.350 - 7.450    PCO2 100.0 (HH) 35.0 - 45.0 mmHg    PO2 46.4 (L) 75.0 - 100.0 mmHg    HCO3 43.9 (H) 22.0 - 26.0 mmol/L    B.E. 14.2 (H) -3.0 - 3.0 mmol/L    O2 Sat 78.4 (L) 92.0 - 98.5 %    PO2/FIO2 0.58 mmHg/%    AaDO2 399.0 mmHg    RI(T) 8.60     O2Hb 77.4 (L) 94.0 - 97.0 %    COHb 0.8 0.0 - 1.5 %    MetHb 0.5 0.0 - 1.5 %    O2 Content 9.7 mL/dL    HHb 21.3 (H) 0.0 - 5.0 %    tHb (est) 8.9 (L) 11.5 - 16.5 g/dL    Mode BILEVEL     FIO2 80.0 %    Peep/Cpap 8.0 cmH2O    PIP 16.0 cmH2O    Date Of Collection      Time Collected      Pt Temp 37.0 C     ID [id number]     Lab [id number]     Critical(s) Notified Handed report to Dr/RN    Troponin   Result Value Ref Range    Troponin, High Sensitivity 114 (H) 0 - 9 ng/L   Blood Gas, Arterial   Result Value Ref Range    Date Analyzed 20230117     Time Analyzed 2004     Source: Blood Arterial     pH, Blood Gas 7.257 (L) 7.350 - 7.450    PCO2 90.0 (HH) 35.0 - 45.0 mmHg    PO2 183.4 (H) 75.0 - 100.0 mmHg    HCO3 39.2 (H) 22.0 - 26.0 mmol/L    B.E. 10.0 (H) -3.0 - 3.0 mmol/L    O2 Sat 98.6 (H) 92.0 - 98.5 %    PO2/FIO2 2.29 mmHg/%    AaDO2 272.5 mmHg    RI(T) 1.49     O2Hb 97.6 (H) 94.0 - 97.0 %    COHb 0.5 0.0 - 1.5 %    MetHb 0.5 0.0 - 1.5 %    O2 Content 12.8 mL/dL    HHb 1.4 0.0 - 5.0 %    tHb (est) 9.0 (L) 11.5 - 16.5 g/dL    Mode AVAPS     FIO2 80.0 %    Rr Mechanical 18.0 b/min    Vt Mechanical 400.0 mL    Peep/Cpap 6.0 cmH2O    Date Of Collection      Time Collected      Pt Temp 37.0 C     ID [id number]     Lab [id number]     Critical(s) Notified Handed report to Dr/RN    Basic Metabolic Panel w/ Reflex to MG   Result Value Ref Range    Sodium 141 132 - 146 mmol/L    Potassium reflex Magnesium 5.2 (H) 3.5 - 5.0 mmol/L    Chloride 97 (L) 98 - 107 mmol/L    CO2 39 (H) 22 - 29 mmol/L    Anion Gap 5 (L) 7 - 16 mmol/L    Glucose 147 (H) 74 - 99 mg/dL    BUN 47 (H) 6 - 23 mg/dL    Creatinine 2.2 (H) 0.5 - 1.0 mg/dL    Est, Glom Filt Rate 23 >=60 mL/min/1.73    Calcium 8.3 (L) 8.6 - 10.2 mg/dL   Phosphorus   Result Value Ref Range    Phosphorus 5.6 (H) 2.5 - 4.5 mg/dL   Troponin   Result Value Ref Range    Troponin, High Sensitivity 93 (H) 0 - 9 ng/L   CBC auto differential   Result Value Ref Range    WBC 9.9 4.5 - 11.5 E9/L    RBC 2.84 (L) 3.50 - 5.50 E12/L    Hemoglobin 7.3 (L) 11.5 - 15.5 g/dL    Hematocrit 26.5 (L) 34.0 - 48.0 %    MCV 93.3 80.0 - 99.9 fL    MCH 25.7 (L) 26.0 - 35.0 pg    MCHC 27.5 (L) 32.0 - 34.5 %    RDW 16.0 (H) 11.5 - 15.0 fL    Platelets 337 974 - 695 E9/L    MPV 10.3 7.0 - 12.0 fL    Neutrophils Absolute 9.31 (H) 1.80 - 7.30 E9/L    Anisocytosis 1+     Polychromasia 2+     Hypochromia 1+     Poikilocytes 1+     Ovalocytes 1+     Tear Drop Cells 1+     Basophilic Stippling 1+    Urinalysis with Microscopic   Result Value Ref Range    Color, UA Yellow Straw/Yellow    Clarity, UA CLOUDY (A) Clear    Glucose, Ur Negative Negative mg/dL    Bilirubin Urine Negative Negative    Ketones, Urine TRACE (A) Negative mg/dL    Specific Gravity, UA 1.020 1.005 - 1.030    Blood, Urine Negative Negative    pH, UA 5.5 5.0 - 9.0    Protein, UA TRACE Negative mg/dL    Urobilinogen, Urine 0.2 <2.0 E.U./dL Nitrite, Urine Negative Negative    Leukocyte Esterase, Urine LARGE (A) Negative    WBC, UA >20 (A) 0 - 5 /HPF    RBC, UA 0-1 0 - 2 /HPF    Bacteria, UA MODERATE (A) None Seen /HPF   Blood Gas, Arterial   Result Value Ref Range    Date Analyzed [id number]     Time Analyzed 0443     Source: Blood Arterial     pH, Blood Gas 7.316 (L) 7.350 - 7.450    PCO2 80.0 (HH) 35.0 - 45.0 mmHg    PO2 76.9 75.0 - 100.0 mmHg    HCO3 39.9 (H) 22.0 - 26.0 mmol/L    B.E. 11.9 (H) -3.0 - 3.0 mmol/L    O2 Sat 94.1 92.0 - 98.5 %    PO2/FIO2 2.56 mmHg/%    AaDO2 35.5 mmHg    RI(T) 0.46     O2Hb 93.3 (L) 94.0 - 97.0 %    COHb 0.5 0.0 - 1.5 %    MetHb 0.4 0.0 - 1.5 %    O2 Content 11.3 mL/dL    HHb 5.8 (H) 0.0 - 5.0 %    tHb (est) 8.5 (L) 11.5 - 16.5 g/dL    Mode BILEVEL     FIO2 30.0 %    Peep/Cpap 8.0 cmH2O    PIP 18.0 cmH2O    Date Of Collection      Time Collected      Pt Temp 37.0 C     ID [id number]     Lab [id number]     Critical(s) Notified Handed report to Dr/RN    POCT Glucose   Result Value Ref Range    Meter Glucose 151 (H) 74 - 99 mg/dL   EKG 12 Lead   Result Value Ref Range    Ventricular Rate 109 BPM    Atrial Rate 107 BPM    QRS Duration 78 ms    Q-T Interval 302 ms    QTc Calculation (Bazett) 406 ms    R Axis 90 degrees    T Axis -64 degrees     ED Course as of 01/18/23 0508   Tue Jan 17, 2023   1759 PCO2 on ABG is 100 with a pH of 7.26, she has been pulling low tidal volumes, also appears to be on midodrine, she has low blood pressure at this time. We will give a dose of her midodrine, as well as digoxin for her A. fib RVR, fluids, and adjust her BiPAP to aid with her hypercapnic respiratory failure, respiratory status is tenuous, and will discuss with family member potential for intubation [JG]   1807 EKG: This EKG is signed by emergency department physician.     Rate: 142  Rhythm: Atrial fibrillation and Rapid ventricular response  Interpretation: non-specific EKG  Comparison: changes compared to previous EKG from 12/30/2022     [JG]   1827 Notably anemic on CBC, however appears to be baseline for patient. Patient refused digoxin, she is alert and oriented x3 at this time [JG]   1913 EKG interpreted by ER attending physician. Ventricular rate 109, castration 78, QTc 406, atrial fibrillation with RVR. [HH]   1923 We will check a second troponin and blood gas. [JG]   2014 Repeat blood gas shows improving pH and PCO2 of 90 [JG]   216 23-year-old female presenting emerged from for respiratory distress, typically on 3 L nasal cannula, found to be more altered, and hypoxic, has had previous issues with hypercapnic respiratory failure per the daughter, patient's history provided by daughter and the family. Stated possible sepsis as patient's blood pressure was initially low, however significant proved with fluids, midodrine, cultures were obtained, however patient was not severely febrile, did not have a significant leukocytosis, and has had issues with chronic pleural effusion to suggest this was underlying infection. ABG initially concerning for mixed metabolic and respiratory acidosis, she was switched from her BiPAP settings she came in on EMS to AVAPS due to poor volumes on BiPAP. She is given Decadron and a DuoNeb as well as lung sounds are diminished. She was initially and rapid A. fib RVR, however this mostly improved with fluids, attempted to give digoxin, however patient and the family refused this as she has had issues with previous digoxin toxicity. Given her tenuous respiratory status, although she had good mentation, on top of her hypotension, and A. fib RVR, felt observation in the ICU would be appropriate. She was admitted the hospital under the hospitalist, and accepted for admission by the intensivist.  Spoke to the family, she is full code, however the family would like to have a discussion prior to intubation if it is needed.  [JG]   2040 Dr. Jeanie Guido states this is not his patient so was admitted to the hospitalist [JG]      ED Course User Index  [HH] Misty Callahan MD  [JG] Iraida Tobar MD     Radiology: XR CHEST PORTABLE    Result Date: 1/17/2023  EXAMINATION: ONE XRAY VIEW OF THE CHEST 1/17/2023 5:38 pm COMPARISON: 01/16/2023 HISTORY: ORDERING SYSTEM PROVIDED HISTORY: sob TECHNOLOGIST PROVIDED HISTORY: Reason for exam:->sob FINDINGS: The lungs are without acute focal process. Stable right pleural effusion. No pneumothorax. Stable heart size. The osseous structures are without acute process. Stable right pleural effusion. ASSESSMENT:    Present on Admission:   Acute on chronic respiratory failure with hypoxia and hypercapnia (HCC)    PLAN:  # Acute exacerbation of COPD with hypercapnic respiratory failure   Patient placed in BIPAP, Ph 7.27>7.3, CO2 90>80   BP improved after fluid and midodrine   Imaging showed no pneumonia  Started on Solumedrol IV  Started with IV Ceftriaxone + IV Doxy  Humidified Oxygen @2-6 L/min PRN  Monitor Vitals /Telemetry  # Paroxysmal Afib   Currently rate controlled with po meds   Eliquis   Continue home medications as in chart   Telemetry and monitor vitals  # Possible UTI   Started with IV Ceftriaxone   F/u urine culture and adjust/de-escalate antibiotics and Plan for ID consult    # DM moderately controlled  On Low carb diet  Nutritional and dietary counseling   Placed on sliding scale inulin   Bedside glucose before meals and bedtime and adjust insulin accordingly  # Parkinson's Disease    Continue L-dopa, home medications   PT Eval for gait stablitiy   SW eval for safe discharge planning  # Rest of the chronic medical problems are stable and will be managed with appropriately with home medications, placed nursing communication order to verify home medications before giving them to the patient.   # Diet: On PO Diet  # IVF's: No  # Fall Precaution: Yes  # Disposition: Home/primary residence   # Code Status: Full code  # DVT Prophylaxis : Eliquis  The patient at bedside was counseled about clinical status, laboratory/imaging results, diagnoses, medication side effects, risk, and treatment plan, all questions were answered to patient's satisfaction and verbalized understanding        SIGNATURE: Bonnie Vu MD PATIENT NAME: Medina Velasquez #: Hospitalist on call MRN: [id number]     Disclaimer: Portions of this note may have been generated using Dragon voice recognition software. Reasonable efforts were made to correct any dictation errors that resulted due to the programming of this software but some may still be present.

## 2023-01-18 NOTE — CONSULTS
Pulmonary/Critical Care Consult Note    CHIEF COMPLAINT: Shortness of breath and hypotension    HISTORY OF PRESENT ILLNESS: Patient is a 41-year-old female with history of COPD, CAD, breast cancer, CKD, hypertension, hyperlipidemia, diabetes mellitus, obstructive sleep apnea noncompliant with CPAP, oxygen dependence, and Parkinson's disease. Patient presented to the ED on 1/17/2023 for increased shortness of breath over the past 2 days. Patient placed on BiPAP on presentation to the ED and initial ABG was likely mixed or venous. Repeat ABG on AVAPS showed a pH of 7.257, PCO2 90, PO2 183.4, HCO3 39.2, and SPO2 98.6%. The patient also received 1 L normal saline bolus and midodrine 10 mg p.o. x1 for hypotension which the patient takes at the nursing home 3 times daily. The patient did respond well to the IV fluids and does not require vasopressor support at this time. Lactic acid normal at 1.7. According to the patient's family she is supposed to wear CPAP at night but is noncompliant. She uses supplemental oxygen as needed. Portable chest x-ray shows stable right pleural effusion and no other acute cardiopulmonary process. ALLERGY:  Ciprofloxacin, Codeine, and Digoxin and related    FAMILY HISTORY:  Family History   Problem Relation Age of Onset    Cancer Mother         Lung Ca    Cancer Father         lung Ca    Diabetes Sister     Heart Disease Sister     Seizures Son     Other Brother         sepsis       SOCIAL HISTORY:  Social History     Socioeconomic History    Marital status:       Spouse name: Not on file    Number of children: Not on file    Years of education: Not on file    Highest education level: Not on file   Occupational History    Not on file   Tobacco Use    Smoking status: Never    Smokeless tobacco: Never   Vaping Use    Vaping Use: Never used   Substance and Sexual Activity    Alcohol use: No    Drug use: No    Sexual activity: Never   Other Topics Concern    Not on file Social History Narrative    Not on file     Social Determinants of Health     Financial Resource Strain: Not on file   Food Insecurity: Not on file   Transportation Needs: Not on file   Physical Activity: Not on file   Stress: Not on file   Social Connections: Not on file   Intimate Partner Violence: Not on file   Housing Stability: Not on file       MEDICAL HISTORY:  Past Medical History:   Diagnosis Date    A-fib [facility])     Acute on chronic congestive heart failure (HCC)     Anxiety     Asthma     CAD (coronary artery disease) 01/21/2016    Cancer ([facility] [address]  breast ca 2006    right lumpectomy    Chronic kidney disease     nephrolithiasis    COPD exacerbation ([facility] [address] 10/12/2022    Depression     [facility]     H/O mammogram     Hx MRSA infection     toe infection january 2012    Hyperlipidemia     Hypertension     Lateral epicondylitis     Morbid obesity (HCC)     SERENA on CPAP     Oxygen dependent     Parkinson's disease ([facility]     Tubal ligation status        MEDICATIONS:   digoxin  250 mcg IntraVENous Once           REVIEW OF SYSTEMS:  Constitutional: Denies fever, weight loss, night sweats, and fatigue  Skin: Denies pigmentation, dark lesions, and rashes   HEENT: Denies hearing loss, tinnitus, ear drainage, epistaxis, sore throat, and hoarseness. Cardiovascular: Denies palpitations, chest pain, and chest pressure. Respiratory: Denies cough, reports dyspnea at rest worse with exertion, denies, hemoptysis, apnea, and choking.   Gastrointestinal: Denies nausea, vomiting, poor appetite, diarrhea, heartburn or reflux  Genitourinary: Denies dysuria, frequency, urgency or hematuria  Musculoskeletal: Denies myalgias, muscle weakness, and bone pain  Neurological: Denies dizziness, vertigo, headache, and focal weakness  Psychological: Denies anxiety and depression  Endocrine: Denies heat intolerance and cold intolerance  Hematopoietic/Lymphatic: Denies bleeding problems and blood transfusions    PHYSICAL EXAM:  Vitals:    01/17/23 2047   BP: 100/62   Pulse: (!) 109   Resp: 12   Temp:    SpO2: 91%     FiO2 : 80 %     O2 Device: Other (Comment) (BiPAP)    Constitutional: No fever, chills, diaphoresis  HEENT: No head lesions, PERRL, EOMI, mouth without lesions, no nasal lesions, no cervical adenopathy palpated   Respiratory: Lungs with equal breath sounds bilaterally diminished with faint expiratory wheezes, no accessory muscle use   CV: Rapid rate and irregular rhythm, no murmurs, JVD, bilateral leg edema  Abdomen: Soft, obese, non tender, + bowel sounds, no lesions   Skin: Adequate turgor, no rash, capillary refill <2 seconds, rash noted to bilateral lower abdominal folds, wound noted to left anterior shin  Extremities: Muscular strength 4/4 in 4 limbs, moves 4 limbs spontaneously, distal pulses present   Neurology: Awake and alert, follows commands, moves 4 limbs on command and spontaneously, equal sensation, no dysmetria, neck is supple, no meningitic signs present.       LABS:  WBC   Date Value Ref Range Status   01/17/2023 11.0 4.5 - 11.5 E9/L Final   01/16/2023 8.9 4.5 - 11.5 E9/L Final   12/30/2022 6.8 4.5 - 11.5 E9/L Final     Hemoglobin   Date Value Ref Range Status   01/17/2023 8.1 (L) 11.5 - 15.5 g/dL Final   01/16/2023 8.7 (L) 11.5 - 15.5 g/dL Final   12/30/2022 8.4 (L) 11.5 - 15.5 g/dL Final     Hematocrit   Date Value Ref Range Status   01/17/2023 31.1 (L) 34.0 - 48.0 % Final   01/16/2023 32.6 (L) 34.0 - 48.0 % Final   12/30/2022 30.9 (L) 34.0 - 48.0 % Final     MCV   Date Value Ref Range Status   01/17/2023 94.8 80.0 - 99.9 fL Final   01/16/2023 95.0 80.0 - 99.9 fL Final   12/30/2022 96.0 80.0 - 99.9 fL Final     Platelets   Date Value Ref Range Status   01/17/2023 195 130 - 450 E9/L Final   01/16/2023 244 130 - 450 E9/L Final   12/30/2022 219 130 - 450 E9/L Final     Sodium   Date Value Ref Range Status   01/17/2023 141 132 - 146 mmol/L Final   01/16/2023 141 132 - 146 mmol/L Final   01/16/2023 139 132 - 146 mmol/L Final     Potassium   Date Value Ref Range Status   01/17/2023 4.8 3.5 - 5.0 mmol/L Final   01/16/2023 4.4 3.5 - 5.0 mmol/L Final   01/13/2023 4.4 3.5 - 5.0 mmol/L Final     Potassium reflex Magnesium   Date Value Ref Range Status   01/16/2023 4.5 3.5 - 5.0 mmol/L Final   12/23/2022 4.2 3.5 - 5.0 mmol/L Final   12/22/2022 4.3 3.5 - 5.0 mmol/L Final     Chloride   Date Value Ref Range Status   01/17/2023 94 (L) 98 - 107 mmol/L Final   01/16/2023 93 (L) 98 - 107 mmol/L Final   01/16/2023 93 (L) 98 - 107 mmol/L Final     CO2   Date Value Ref Range Status   01/17/2023 37 (H) 22 - 29 mmol/L Final   01/16/2023 39 (H) 22 - 29 mmol/L Final   01/16/2023 38 (H) 22 - 29 mmol/L Final     BUN   Date Value Ref Range Status   01/17/2023 44 (H) 6 - 23 mg/dL Final   01/16/2023 38 (H) 6 - 23 mg/dL Final   01/16/2023 39 (H) 6 - 23 mg/dL Final     Creatinine   Date Value Ref Range Status   01/17/2023 2.1 (H) 0.5 - 1.0 mg/dL Final   01/16/2023 2.1 (H) 0.5 - 1.0 mg/dL Final   01/16/2023 2.1 (H) 0.5 - 1.0 mg/dL Final     Glucose   Date Value Ref Range Status   01/17/2023 94 74 - 99 mg/dL Final   01/16/2023 79 74 - 99 mg/dL Final   01/16/2023 78 74 - 99 mg/dL Final     Calcium   Date Value Ref Range Status   01/17/2023 9.3 8.6 - 10.2 mg/dL Final   01/16/2023 9.1 8.6 - 10.2 mg/dL Final   01/16/2023 9.0 8.6 - 10.2 mg/dL Final     Total Protein   Date Value Ref Range Status   01/17/2023 6.1 (L) 6.4 - 8.3 g/dL Final   01/16/2023 6.3 (L) 6.4 - 8.3 g/dL Final   12/30/2022 5.8 (L) 6.4 - 8.3 g/dL Final     Albumin   Date Value Ref Range Status   01/17/2023 3.6 3.5 - 5.2 g/dL Final   01/16/2023 3.8 3.5 - 5.2 g/dL Final   12/30/2022 3.6 3.5 - 5.2 g/dL Final     Total Bilirubin   Date Value Ref Range Status   01/17/2023 0.5 0.0 - 1.2 mg/dL Final   01/16/2023 0.4 0.0 - 1.2 mg/dL Final   12/30/2022 0.4 0.0 - 1.2 mg/dL Final     Alkaline Phosphatase   Date Value Ref Range Status   01/17/2023 98 35 - 104 U/L Final   01/16/2023 96 35 - 104 U/L Final   12/30/2022 94 35 - 104 U/L Final     AST   Date Value Ref Range Status   01/17/2023 9 0 - 31 U/L Final   01/16/2023 10 0 - 31 U/L Final   12/30/2022 11 0 - 31 U/L Final     ALT   Date Value Ref Range Status   01/17/2023 <5 0 - 32 U/L Final   01/16/2023 <5 0 - 32 U/L Final   12/30/2022 <5 0 - 32 U/L Final     Est, Glom Filt Rate   Date Value Ref Range Status   01/17/2023 24 >=60 mL/min/1.73 Final     Comment:     Pediatric calculator link  [email or web address]  Effective Oct 3, 2022  These results are not intended for use in patients  <25years of age. eGFR results are calculated without  a race factor using the 2021 CKD-EPI equation. Careful  clinical correlation is recommended, particularly when  comparing to results calculated using previous equations. The CKD-EPI equation is less accurate in patients with  extremes of muscle mass, extra-renal metabolism of  creatinine, excessive creatinine ingestion, or following  therapy that affects renal tubular secretion. 01/16/2023 24 >=60 mL/min/1.73 Final     Comment:     Pediatric calculator link  [email or web address]  Effective Oct 3, 2022  These results are not intended for use in patients  <25years of age. eGFR results are calculated without  a race factor using the 2021 CKD-EPI equation. Careful  clinical correlation is recommended, particularly when  comparing to results calculated using previous equations. The CKD-EPI equation is less accurate in patients with  extremes of muscle mass, extra-renal metabolism of  creatinine, excessive creatinine ingestion, or following  therapy that affects renal tubular secretion. 01/16/2023 24 >=60 mL/min/1.73 Final     Comment:     Pediatric calculator link  [email or web address]  Effective Oct 3, 2022  These results are not intended for use in patients  <25years of age.   eGFR results are calculated without  a race factor using the 2021 CKD-EPI equation. Careful  clinical correlation is recommended, particularly when  comparing to results calculated using previous equations. The CKD-EPI equation is less accurate in patients with  extremes of muscle mass, extra-renal metabolism of  creatinine, excessive creatinine ingestion, or following  therapy that affects renal tubular secretion. GFR    Date Value Ref Range Status   10/16/2022 >60  Final   10/14/2022 59  Final   10/13/2022 >60  Final     Magnesium   Date Value Ref Range Status   12/30/2022 2.0 1.6 - 2.6 mg/dL Final   10/14/2022 2.1 1.6 - 2.6 mg/dL Final   10/08/2022 1.9 1.6 - 2.6 mg/dL Final     Phosphorus   Date Value Ref Range Status   10/08/2022 3.0 2.5 - 4.5 mg/dL Final   10/07/2022 2.8 2.5 - 4.5 mg/dL Final   10/06/2022 2.6 2.5 - 4.5 mg/dL Final     Recent Labs     01/17/23 2004   PH 7.257*   PO2 183.4*   PCO2 90.0*   HCO3 39.2*   BE 10.0*   O2SAT 98.6*       RADIOLOGY:  XR CHEST PORTABLE   Final Result   Stable right pleural effusion. IMPRESSION:  Acute on chronic hypoxic and hypercapnic respiratory failure  COPD exacerbation  Obesity induced hypoventilation syndrome  NSTEMI-likely type II due to supply/demand mismatch  SHANTANU on CKD  UTI  Type 2 diabetes with hyperglycemia  Chronic atrial fibrillation with RVR  Stable right pleural effusion  Normocytic hypochromic anemia-appears chronic  Severe pulmonary hypertension-RVSP \"at least\" 60 mmHg on 7/7/2022  SERENA noncompliant with CPAP  [employer]trigo    PLAN:  Continue BiPAP at least overnight, take patient off AVAPS, and decrease FiO2 to keep SPO2 between 88 and 92%, and hopefully transition patient to nasal cannula tomorrow morning  ABG in the a.m.   Rocephin 1 g IV every 24  Prednisone 40 mg p.o. daily x5 doses  DuoNeb and Brovana  Rapid influenza and COVID negative  Blood cultures x2 pending  IV fluids, strict intake and output, avoid nephrotoxic medications, and monitor kidney function  Continue Eliquis  Repeat troponin  Cardiac telemetry  Wound culture to left lower leg  Miconazole powder to rash on lower abdominal folds twice daily          CRITICAL CARE TIME:  33 minutes    Electronically signed by Muriel Koyanagi, APRN - CNP on 1/17/2023 at 9:00 PM

## 2023-01-18 NOTE — PROGRESS NOTES
Notified ICU NP no urine output; bladder scan revealed 263 cc; verified purewick placement and suction in tact. Will continue to monitor.  Melissa Lew RN

## 2023-01-18 NOTE — PROGRESS NOTES
Per NH paperwork; woundcare to L shin has been cleanse with NSS, apply xeroform than algisite, cover with ABD wrap with kerlix, change every other day and prn. Also NH diet is dysphagia, mechanical soft, no added salt, and honey thickened liquids.  Ryan Quiroz RN

## 2023-01-18 NOTE — PROGRESS NOTES
Patent appointment rescheduled for  CHF clinic due to hospitalization. Rescheduled for 2/3 . After visit summary updated to reflect change.     Future Appointments   Date Time Provider Prateek Nassar   2/3/2023 11:00 AM [facility] CHF ROOM Natasha Hernandez [id number] [facility] [place]cl[place]

## 2023-01-18 NOTE — PROGRESS NOTES
Pulmonary/Critical Care Consult Note    CHIEF COMPLAINT: Shortness of breath and hypotension      IMPRESSION/ PLAN:    Acute on chronic hypoxic and hypercapnic respiratory failure  COPD   Obesity induced hypoventilation syndrome  SERENA noncompliant with CPAP  On BiPAP overnight   Much improved  States at baseline   ABG normalizing  Repeat in AM  No clear wheezing or evidence of COPD exacerbation - ok to stop prednisone  DuoNeb and Brovana    CHF - Diastolic  Pleural effusions  Very elevated pro-BNP    Hypotension  History of   Difficult to evaluate intravascular status  Given fluid overnight     Severe pulmonary hypertension  Secondary to above      SHANTANU on CKD  Hyperkalemia  Close to baseline  Nephrology consultation  Lokelma x 1  Follow     Chronic atrial fibrillation with RVR  Om metoprolol  Give extra dose IV  Eliquis    UTI  ON Ceftriaxone  Lactate normal    Type 2 diabetes   Insulin sliding scale  Stop pred          Normocytic hypochromic anemia-appears chronic    Intertrigo  Wound culture to left lower leg  Miconazole powder to rash on lower abdominal folds twice daily      __________________________________________    Subjective  Feeling better  States at baseline    Events last 24 hr  AF with RVR  Borderline BP  CXR with bilateral pleural effusions              HISTORY OF PRESENT ILLNESS: Patient is a 79-year-old female with history of COPD, CAD, breast cancer, CKD, hypertension, hyperlipidemia, diabetes mellitus, obstructive sleep apnea noncompliant with CPAP, oxygen dependence, and Parkinson's disease. Patient presented to the ED on 1/17/2023 for increased shortness of breath over the past 2 days. Patient placed on BiPAP on presentation to the ED and initial ABG was likely mixed or venous. Repeat ABG on AVAPS showed a pH of 7.257, PCO2 90, PO2 183.4, HCO3 39.2, and SPO2 98.6%.   The patient also received 1 L normal saline bolus and midodrine 10 mg p.o. x1 for hypotension which the patient takes at the nursing home 3 times daily. The patient did respond well to the IV fluids and does not require vasopressor support at this time. Lactic acid normal at 1.7. According to the patient's family she is supposed to wear CPAP at night but is noncompliant. She uses supplemental oxygen as needed. Portable chest x-ray shows stable right pleural effusion and no other acute cardiopulmonary process. ALLERGY:  Ciprofloxacin, Codeine, and Digoxin and related    FAMILY HISTORY:  Family History   Problem Relation Age of Onset    Cancer Mother         Lung Ca    Cancer Father         lung Ca    Diabetes Sister     Heart Disease Sister     Seizures Son     Other Brother         sepsis       SOCIAL HISTORY:  Social History     Socioeconomic History    Marital status:       Spouse name: Not on file    Number of children: Not on file    Years of education: Not on file    Highest education level: Not on file   Occupational History    Not on file   Tobacco Use    Smoking status: Never    Smokeless tobacco: Never   Vaping Use    Vaping Use: Never used   Substance and Sexual Activity    Alcohol use: No    Drug use: No    Sexual activity: Never   Other Topics Concern    Not on file   Social History Narrative    Not on file     Social Determinants of Health     Financial Resource Strain: Not on file   Food Insecurity: Not on file   Transportation Needs: Not on file   Physical Activity: Not on file   Stress: Not on file   Social Connections: Not on file   Intimate Partner Violence: Not on file   Housing Stability: Not on file       MEDICAL HISTORY:  Past Medical History:   Diagnosis Date    A-fib [facility])     Acute on chronic congestive heart failure (HCC)     Anxiety     Asthma     CAD (coronary artery disease) 01/21/2016    Cancer ([facility] [address]  breast ca 2006    right lumpectomy    Chronic kidney disease     nephrolithiasis    COPD exacerbation ([facility] [address] 10/12/2022    Depression     Diabetes mellitus ([facility] [address]     H/O mammogram     Hx MRSA infection     toe infection january 2012    Hyperlipidemia     Hypertension     Lateral epicondylitis     Morbid obesity (HCC)     SERENA on CPAP     Oxygen dependent     Parkinson's disease ([facility] [address]     Tubal ligation status        MEDICATIONS:   sucralfate  1 g Oral 4x Daily    rOPINIRole  1 mg Oral TID    atorvastatin  20 mg Oral Nightly    ipratropium-albuterol  1 vial Inhalation 4x Daily    insulin glargine  13 Units SubCUTAneous BID    LORazepam  0.5 mg Oral Nightly    [Held by provider] bumetanide  2 mg Oral Daily    metoprolol succinate  25 mg Oral BID    insulin lispro  0-4 Units SubCUTAneous TID WC    insulin lispro  0-4 Units SubCUTAneous Nightly    budesonide  0.5 mg Nebulization BID    doxycycline (VIBRAMYCIN) IV  100 mg IntraVENous Q12H    digoxin  250 mcg IntraVENous Once    predniSONE  40 mg Oral Daily    cefTRIAXone (ROCEPHIN) IV  1,000 mg IntraVENous Q24H    sodium chloride flush  10 mL IntraVENous 2 times per day    miconazole   Topical BID    arformoterol tartrate  15 mcg Nebulization BID    pantoprazole  40 mg Oral QAM AC    apixaban  5 mg Oral BID    mirtazapine  7.5 mg Oral Nightly    aspirin  81 mg Oral Daily    carbidopa-levodopa  1 tablet Oral TID    sertraline  50 mg Oral Daily    montelukast  10 mg Oral Nightly    [Held by provider] midodrine  5 mg Oral TID       sodium chloride      sodium chloride 100 mL/hr at 01/18/23 0508    dextrose           PHYSICAL EXAM:  Vitals:    01/18/23 0600   BP: (!) 129/54   Pulse: 88   Resp: 24   Temp:    SpO2: 98%     FiO2 : 25 %     O2 Device: PAP (positive airway pressure) (18/8)    Constitutional: No fever, chills, diaphoresis  HEENT: No head lesions, PERRL, EOMI, mouth without lesions, no nasal lesions, no cervical adenopathy palpated   Respiratory: Lungs with equal breath sounds bilaterally diminished with no  wheezes, no accessory muscle use   CV: Rapid rate and irregular rhythm, no murmurs, JVD, bilateral leg edema  Abdomen: Soft, obese, non tender, + bowel sounds, no lesions   Skin: Adequate turgor, no rash, capillary refill <2 seconds, rash noted to bilateral lower abdominal folds, wound noted to left anterior shin  Extremities: Muscular strength 4/4 in 4 limbs, moves 4 limbs spontaneously, distal pulses present   Neurology: Awake and alert, follows commands, moves 4 limbs on command and spontaneously, equal sensation, no dysmetria, neck is supple, no meningitic signs present.       LABS:  WBC   Date Value Ref Range Status   01/18/2023 9.9 4.5 - 11.5 E9/L Final   01/17/2023 11.0 4.5 - 11.5 E9/L Final   01/16/2023 8.9 4.5 - 11.5 E9/L Final     Hemoglobin   Date Value Ref Range Status   01/18/2023 7.3 (L) 11.5 - 15.5 g/dL Final   01/17/2023 8.1 (L) 11.5 - 15.5 g/dL Final   01/16/2023 8.7 (L) 11.5 - 15.5 g/dL Final     Hematocrit   Date Value Ref Range Status   01/18/2023 26.5 (L) 34.0 - 48.0 % Final   01/17/2023 31.1 (L) 34.0 - 48.0 % Final   01/16/2023 32.6 (L) 34.0 - 48.0 % Final     MCV   Date Value Ref Range Status   01/18/2023 93.3 80.0 - 99.9 fL Final   01/17/2023 94.8 80.0 - 99.9 fL Final   01/16/2023 95.0 80.0 - 99.9 fL Final     Platelets   Date Value Ref Range Status   01/18/2023 174 130 - 450 E9/L Final   01/17/2023 195 130 - 450 E9/L Final   01/16/2023 244 130 - 450 E9/L Final     Sodium   Date Value Ref Range Status   01/18/2023 141 132 - 146 mmol/L Final   01/17/2023 141 132 - 146 mmol/L Final   01/16/2023 141 132 - 146 mmol/L Final     Potassium   Date Value Ref Range Status   01/17/2023 4.8 3.5 - 5.0 mmol/L Final   01/16/2023 4.4 3.5 - 5.0 mmol/L Final   01/13/2023 4.4 3.5 - 5.0 mmol/L Final     Potassium reflex Magnesium   Date Value Ref Range Status   01/18/2023 5.2 (H) 3.5 - 5.0 mmol/L Final   01/16/2023 4.5 3.5 - 5.0 mmol/L Final   12/23/2022 4.2 3.5 - 5.0 mmol/L Final     Chloride   Date Value Ref Range Status   01/18/2023 97 (L) 98 - 107 mmol/L Final   01/17/2023 94 (L) 98 - 107 mmol/L Final   01/16/2023 93 (L) 98 - 107 mmol/L Final     CO2 Date Value Ref Range Status   01/18/2023 39 (H) 22 - 29 mmol/L Final   01/17/2023 37 (H) 22 - 29 mmol/L Final   01/16/2023 39 (H) 22 - 29 mmol/L Final     BUN   Date Value Ref Range Status   01/18/2023 47 (H) 6 - 23 mg/dL Final   01/17/2023 44 (H) 6 - 23 mg/dL Final   01/16/2023 38 (H) 6 - 23 mg/dL Final     Creatinine   Date Value Ref Range Status   01/18/2023 2.2 (H) 0.5 - 1.0 mg/dL Final   01/17/2023 2.1 (H) 0.5 - 1.0 mg/dL Final   01/16/2023 2.1 (H) 0.5 - 1.0 mg/dL Final     Glucose   Date Value Ref Range Status   01/18/2023 147 (H) 74 - 99 mg/dL Final   01/17/2023 94 74 - 99 mg/dL Final   01/16/2023 79 74 - 99 mg/dL Final     Calcium   Date Value Ref Range Status   01/18/2023 8.3 (L) 8.6 - 10.2 mg/dL Final   01/17/2023 9.3 8.6 - 10.2 mg/dL Final   01/16/2023 9.1 8.6 - 10.2 mg/dL Final     Total Protein   Date Value Ref Range Status   01/17/2023 6.1 (L) 6.4 - 8.3 g/dL Final   01/16/2023 6.3 (L) 6.4 - 8.3 g/dL Final   12/30/2022 5.8 (L) 6.4 - 8.3 g/dL Final     Albumin   Date Value Ref Range Status   01/17/2023 3.6 3.5 - 5.2 g/dL Final   01/16/2023 3.8 3.5 - 5.2 g/dL Final   12/30/2022 3.6 3.5 - 5.2 g/dL Final     Total Bilirubin   Date Value Ref Range Status   01/17/2023 0.5 0.0 - 1.2 mg/dL Final   01/16/2023 0.4 0.0 - 1.2 mg/dL Final   12/30/2022 0.4 0.0 - 1.2 mg/dL Final     Alkaline Phosphatase   Date Value Ref Range Status   01/17/2023 98 35 - 104 U/L Final   01/16/2023 96 35 - 104 U/L Final   12/30/2022 94 35 - 104 U/L Final     AST   Date Value Ref Range Status   01/17/2023 9 0 - 31 U/L Final   01/16/2023 10 0 - 31 U/L Final   12/30/2022 11 0 - 31 U/L Final     ALT   Date Value Ref Range Status   01/17/2023 <5 0 - 32 U/L Final   01/16/2023 <5 0 - 32 U/L Final   12/30/2022 <5 0 - 32 U/L Final     Est, Glom Filt Rate   Date Value Ref Range Status   01/18/2023 23 >=60 mL/min/1.73 Final     Comment:     Pediatric calculator link  [email or web address]_[email or web address]  Effective Oct 3, 2022  These results are not intended for use in patients  <25years of age. eGFR results are calculated without  a race factor using the 2021 CKD-EPI equation. Careful  clinical correlation is recommended, particularly when  comparing to results calculated using previous equations. The CKD-EPI equation is less accurate in patients with  extremes of muscle mass, extra-renal metabolism of  creatinine, excessive creatinine ingestion, or following  therapy that affects renal tubular secretion. 01/17/2023 24 >=60 mL/min/1.73 Final     Comment:     Pediatric calculator link  [email or web address]. [email or web address]_[email or web address]  Effective Oct 3, 2022  These results are not intended for use in patients  <25years of age. eGFR results are calculated without  a race factor using the 2021 CKD-EPI equation. Careful  clinical correlation is recommended, particularly when  comparing to results calculated using previous equations. The CKD-EPI equation is less accurate in patients with  extremes of muscle mass, extra-renal metabolism of  creatinine, excessive creatinine ingestion, or following  therapy that affects renal tubular secretion. 01/16/2023 24 >=60 mL/min/1.73 Final     Comment:     Pediatric calculator link  [email or web address] [email or web address]_[email or web address]  Effective Oct 3, 2022  These results are not intended for use in patients  <25years of age. eGFR results are calculated without  a race factor using the 2021 CKD-EPI equation. Careful  clinical correlation is recommended, particularly when  comparing to results calculated using previous equations. The CKD-EPI equation is less accurate in patients with  extremes of muscle mass, extra-renal metabolism of  creatinine, excessive creatinine ingestion, or following  therapy that affects renal tubular secretion.        GFR    Date Value Ref Range Status   10/16/2022 >60  Final   10/14/2022 59  Final   10/13/2022 >60  Final Magnesium   Date Value Ref Range Status   12/30/2022 2.0 1.6 - 2.6 mg/dL Final   10/14/2022 2.1 1.6 - 2.6 mg/dL Final   10/08/2022 1.9 1.6 - 2.6 mg/dL Final     Phosphorus   Date Value Ref Range Status   01/18/2023 5.6 (H) 2.5 - 4.5 mg/dL Final   10/08/2022 3.0 2.5 - 4.5 mg/dL Final   10/07/2022 2.8 2.5 - 4.5 mg/dL Final     Recent Labs     01/18/23  0443   PH 7.316*   PO2 76.9   PCO2 80.0*   HCO3 39.9*   BE 11.9*   O2SAT 94.1         RADIOLOGY:  XR CHEST PORTABLE   Final Result   Stable right pleural effusion.          CT ABDOMEN PELVIS WO CONTRAST Additional Contrast? None    (Results Pending)           CRITICAL CARE TIME:  40 minutes    Electronically signed by Margaret Arias MD on 1/18/2023 at 7:58 AM

## 2023-01-18 NOTE — DISCHARGE INSTR - COC
Continuity of Care Form    Patient Name: Aminta Porter   :    MRN:  [id number]    Admit date:  2023  Discharge date:  ***    Code Status Order: Full Code   Advance Directives:     Admitting Physician:  Karen York MD  PCP: Marbella Sullivan MD    Discharging Nurse: [facility] Unit/Room#: IC01/IC01-01  Discharging Unit Phone Number: ***    Emergency Contact:   Extended Emergency Contact Information  Primary Emergency Contact: Junior Muniz [address] Phone: [phone]  Mobile Phone: [phone]  Relation: Child   needed? No  Secondary Emergency Contact: Lilliana Pedroza  [place] Phone: [phone]  Mobile Phone: [phone]  Relation: Child   needed?  No    Past Surgical History:  Past Surgical History:   Procedure Laterality Date    BREAST LUMPECTOMY      BREAST REDUCTION SURGERY      CARDIAC CATHETERIZATION  2014    Dr. Gilma Cavazos  2022    Dr. Rahcael Coulter  7/29/15    CT GUIDED CHEST TUBE  2022    CT GUIDED CHEST TUBE 2022 Amelia Farooq MD [facility]    ENDOSCOPY, COLON, DIAGNOSTIC  7/19/15    GALLBLADDER SURGERY      LUMBAR LAMINECTOMY      TOE AMPUTATION      TONSILLECTOMY      UPPER GASTROINTESTINAL ENDOSCOPY      UPPER GASTROINTESTINAL ENDOSCOPY N/A 2022    EGD ESOPHAGOGASTRODUODENOSCOPY performed by Gurmeet Winston MD at [address] [place] [address]       Immunization History:   Immunization History   Administered Date(s) Administered    COVID-19, PFIZER GRAY top, DO NOT Dilute, (age 15 y+), IM, 30 mcg/0.3 mL 2022    Influenza Vaccine, unspecified formulation 10/15/2014    Influenza Virus Vaccine 10/31/2008, 10/05/2011    Influenza, High Dose (Fluzone 65 yrs and older) 2015, 2016, 2017, 2019    Influenza, Triv, inactivated, subunit, adjuvanted, IM (Fluad 65 yrs and older) 2019    Pneumococcal Conjugate 13-valent (Azell Cancel) 2016 Pneumococcal Polysaccharide ([id number]) 12/21/2012, 01/29/2018    Td, unspecified formulation 03/11/2014    Tdap (Boostrix, Adacel) 09/30/2015    Zoster Live (Zostavax) 06/25/2013    Zoster Recombinant (Shingrix) 12/10/2019       Active Problems:  Patient Active Problem List   Diagnosis Code    Depression with anxiety F41.8    Osteoporosis M81.0    Asthma J45.909    Hyperlipidemia E78.5    Mitral regurgitation I34.0    Obstructive sleep apnea syndrome G47.33    Psoriasis L40.9    Diabetes mellitus (HCC) E11.9    Parkinson's disease ([facility] G20    Primary hypertension I10    Microalbuminuria R80.9    Morbid obesity ([facility] E66.01    RLS (restless legs syndrome) G25.81    Generalized weakness R53.1    Inability to walk R26.2    Hypothyroidism E03.9    Chest pain R07.9    Acute asthma exacerbation J45.901    Asthma exacerbation, mild J45.901    Paroxysmal atrial fibrillation (HCC) I48.0    Atrial fibrillation with rapid ventricular response (HCC) I48.91    Acute on chronic congestive heart failure (HCC) I50.9    Coronary artery disease involving native coronary artery of native heart without angina pectoris I25.10    Dysphagia R13.10    Hepatosplenomegaly R16.2    Acute decompensated heart failure (HCC) Q85.4    Acute diastolic (congestive) heart failure (HCC) I50.31    Nonrheumatic tricuspid valve regurgitation I36.1    Tobacco abuse Z72.0    Recurrent syncope R55    Septic shock ([facility] [place] [facility]) A41.9, R65.21    Seizure-like activity ([facility] [place] [facility]) R56.9    Acute kidney injury ([facility] N17.9    Pancytopenia (HCC) D61.818    Thrombocytopenia (HCC) D69.6    Encephalopathy G93.40    Acute respiratory failure with hypoxia (HCC) J96.01    Lactic acidosis E87.20    Delirium R41.0    Acute on chronic anemia D64.9    Pleural effusion, right J90    Acute respiratory failure with hypoxia and hypercapnia (HCC) J96.01, J96.02    Acute confusion R41.0    Acute on chronic diastolic heart failure (HCC) I50.33    Macrocytosis D75.89    Other specified anemias D64.89    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[facility]) E11.22, N18.30    Puerperal sepsis with acute hypoxic respiratory failure without septic shock ([facility]) O85, R65.20, J96.01    Pulmonary HTN ([facility]) I27.20    Chronic anticoagulation Z79.01    RVF (right ventricular failure) ([facility]) U72.215    Metabolic alkalosis U89.0    Sepsis ([facility]) A41.9    COPD exacerbation ([facility]) J44.1    Acute on chronic respiratory failure with hypercapnia ([facility]) J96.22    Persistent atrial fibrillation ([facility]) I48.19    Hypercapnic respiratory failure ([facility]) J96.92    Acute on chronic respiratory failure ([facility]) J96.20    Hyperkalemia E87.5    Heart failure ([facility]) I50.9    Acute renal insufficiency N28.9    Hypotension I95.9    Anemia D64.9    Acute on chronic respiratory failure with hypoxia and hypercapnia ([facility]) J96.21, J96.22       Isolation/Infection:   Isolation            No Isolation          Patient Infection Status       Infection Onset Added Last Indicated Last Indicated By Review Planned Expiration Resolved Resolved By    None active    Resolved    COVID-19 (Rule Out) 01/17/23 01/17/23 01/17/23 COVID-19, Rapid (Ordered)   01/17/23 Rule-Out Test Resulted    COVID-19 (Rule Out) 12/30/22 12/30/22 12/30/22 COVID-19, Rapid (Ordered)   12/30/22 Rule-Out Test Resulted    COVID-19 (Rule Out) 11/06/22 11/06/22 11/06/22 COVID-19, Rapid (Ordered)   11/06/22 Rule-Out Test Resulted    COVID-19 (Rule Out) 10/12/22 10/12/22 10/12/22 COVID-19, Rapid (Ordered)   10/12/22 Rule-Out Test Resulted    COVID-19 (Rule Out) 10/05/22 10/05/22 10/05/22 Respiratory Panel, Molecular, with COVID-19 (Restricted: peds pts or suitable admitted adults) (Ordered)   10/05/22 Rule-Out Test Resulted    COVID-19 (Rule Out) 10/05/22 10/05/22 10/05/22 COVID-19, Rapid (Ordered)   10/05/22 Rule-Out Test Resulted    COVID-19 (Rule Out) 08/24/22 08/24/22 08/25/22 Respiratory Panel, Molecular, with COVID-19 (Restricted: peds pts or suitable admitted adults) (Ordered)   08/25/22 Rule-Out Test Resulted    COVID-19 (Rule Out) 07/16/22 07/16/22 07/16/22 Respiratory Panel, Molecular, with COVID-19 (Restricted: peds pts or suitable admitted adults) (Ordered)   07/16/22 Rule-Out Test Resulted    COVID-19 (Rule Out) 07/05/22 07/05/22 07/06/22 Respiratory Panel, Molecular, with COVID-19 (Restricted: peds pts or suitable admitted adults) (Ordered)   07/06/22 Rule-Out Test Resulted    COVID-19 (Rule Out) 05/11/22 05/11/22 05/11/22 COVID-19 & Influenza Combo (Ordered)   05/11/22 Rule-Out Test Resulted    COVID-19 (Rule Out) 03/17/22 03/17/22 03/17/22 Respiratory Panel, Molecular, with COVID-19 (Restricted: peds pts or suitable admitted adults) (Ordered)   03/17/22 Rule-Out Test Resulted    COVID-19 (Rule Out) 03/16/22 03/16/22 03/16/22 COVID-19, Rapid (Ordered)   03/16/22 Rule-Out Test Resulted    C-diff Rule Out 01/10/22 01/10/22 01/10/22 C. difficile toxin Molecular (Ordered)   03/17/22 [id number]  Glenroy Tsai RN 1/11/22 1300     COVID-19 (Rule Out) 01/09/22 01/09/22 01/09/22 COVID-19, Rapid (Ordered)   01/09/22 Rule-Out Test Resulted    MRSA 05/19/21 05/21/21 05/19/21 Culture, Wound   10/25/21 Dwayne Kidney, RN    COVID-19 (Rule Out) 02/04/21 02/04/21 02/04/21 COVID-19 (Ordered)   02/04/21 Rule-Out Test Resulted            Nurse Assessment:  Last Vital Signs: BP (!) 115/99   Pulse (!) 114   Temp 98.5 °F (36.9 °C) (Bladder)   Resp 17   Ht 5' (1.524 m)   Wt 262 lb 1.6 oz (118.9 kg)   SpO2 99%   BMI 51.19 kg/m²     Last documented pain score (0-10 scale): Pain Level: 0  Last Weight:   Wt Readings from Last 1 Encounters:   01/17/23 262 lb 1.6 oz (118.9 kg)     Mental Status:  oriented and alert    IV Access:  - Dialysis Catheter  - site  right and subclavian, insertion date: 02/20223    Nursing Mobility/ADLs:  Walking   Dependent  Transfer  Assisted  Bathing  Independent  Dressing  Independent  Toileting  Assisted  Feeding  Independent  Med Admin  Assisted  Med Delivery whole and prefers mixed with applesauce    Wound Care Documentation and Therapy:  Wound 01/17/23 Pretibial Left popped blister (Active)   Wound Etiology Other 01/18/23 0800   Dressing Status Clean;Dry; Intact 01/18/23 0800   Wound Cleansed Cleansed with saline 01/17/23 2136   Dressing/Treatment Foam 01/18/23 0800   Wound Length (cm) 4.5 cm 01/17/23 2136   Wound Width (cm) 5 cm 01/17/23 2136   Wound Depth (cm) 0 cm 01/17/23 2136   Wound Surface Area (cm^2) 22.5 cm^2 01/17/23 2136   Wound Volume (cm^3) 0 cm^3 01/17/23 2136   Wound Assessment Pink/red 01/18/23 0800   Drainage Amount Small 01/18/23 0800   Drainage Description Serous 01/18/23 0800   Odor None 01/18/23 0800   Nicole-wound Assessment Intact; Warm;Blanchable erythema 01/18/23 0800   Number of days: 0       Wound 01/17/23 Foot Anterior; Left (Active)   Dressing/Treatment Other (comment) 01/18/23 0800   Wound Length (cm) 1 cm 01/17/23 2136   Wound Width (cm) 1 cm 01/17/23 2136   Wound Surface Area (cm^2) 1 cm^2 01/17/23 2136   Wound Assessment Pink/red 01/18/23 0800   Drainage Amount None 01/18/23 0800   Odor None 01/18/23 0800   Nicole-wound Assessment Dry/flaky; Intact; Warm 01/18/23 0800   Number of days: 0        Elimination:  Continence: Bowel: Yes  Bladder: Yes  Urinary Catheter: None   Colostomy/Ileostomy/Ileal Conduit: No       Date of Last BM: 02/22/23      Intake/Output Summary (Last 24 hours) at 1/18/2023 1057  Last data filed at 1/18/2023 0511  Gross per 24 hour   Intake 492.68 ml   Output 350 ml   Net 142.68 ml     I/O last 3 completed shifts:   In: 492.7 [I.V.:492.7]  Out: 350 [Urine:350]    Safety Concerns:     History of falls, at risk of falls,     Impairments/Disabilities:      Vision and Hearing    Nutrition Therapy:  Current Nutrition Therapy:   - Oral Diet:  Renal and Low Fat    Routes of Feeding: Oral  Liquids: Honey Thick Liquids  Daily Fluid Restriction: no  Last Modified Barium Swallow with Video (Video Swallowing Test): done on 02/2023/ Treatments at the Time of Hospital Discharge:   Respiratory Treatments: ***  Oxygen Therapy:  is on oxygen at 1 L/min per nasal cannula. Ventilator:    - BiPAP   IPAP: 16 cmH20, CPAP/EPAP: 8 cmH2O only when sleeping    Rehab Therapies: Physical Therapy and Occupational Therapy  Weight Bearing Status/Restrictions: No weight bearing restrictions  Other Medical Equipment (for information only, NOT a DME order):  wheelchair  Other Treatments:       Patient's personal belongings (please select all that are sent with patient):  Glasses, Hearing Aides bilateral, Dentures upper and lower-hearing aides and dentures are at home     RN SIGNATURE:  Electronically signed by Julieta Valles RN on 2/24/23 at 2:07 PM EST    CASE MANAGEMENT/SOCIAL WORK SECTION    Inpatient Status Date: 1/17/23    Readmission Risk Assessment Score:  Readmission Risk              Risk of Unplanned Readmission:  76           Discharging to Facility/ Agency   Name: [facility]  [address] [facility], [address] [place] [address],  [address] [phone]      Dialysis Facility (if applicable)   Name:[facility]  Address: [address] [place] [address]    Dialysis Schedule: MWF 5:54AM on time`1  Phone: [phone]  Fax: [phone]    / signature: Electronically signed by Deborah Chapman RN-BC on 1/18/2023 at 10:57 AM      PHYSICIAN SECTION    Prognosis: Good    Condition at Discharge: Stable    Rehab Potential (if transferring to Rehab): Good    Recommended Labs or Other Treatments After Discharge:     Physician Certification: I certify the above information and transfer of Sebas Downey  is necessary for the continuing treatment of the diagnosis listed and that she requires Intermediate Nursing Care for greater 30 days.      Update Admission H&P: {P DME Changes in HDDVH:[id number]}    PHYSICIAN SIGNATURE:  {Esignature:[id number]}    ***HEART FAILURE - CONGESTIVE HEART FAILURE***  DISCHARGE INSTRUCTIONS:  GUIDELINES TO FOLLOW AT [facility]/SNF/ Assisted Living    Future Appointments   Date Time Provider Prateek Maday   2/3/2023 11:00 AM [facility] CHF ROOM 1 [facility] [address]:  Please notify the doctor if patient is not able to take their medications or if medications are being held for any reasons (such as low blood pressure ect.)  Do not give the patient ibuprofen (Advil or Motrin), naproxen (Aleve) without talking to the doctor first. This could make their heart failure worse. WEIGHT MONITORING:   Weigh patient every day in the morning after they void (If patient is able to stand, please get a standing weight.)   Notify the doctor of a weight gain of 3 pounds or more in 1 day   OR  a total of 5 pounds or more in 1 week             DIET   Cardiac heart healthy diet:  Low sodium diet: no  more than 2,000mg (2 grams) of salt / sodium per day (which equals to a little less than  a teaspoon of salt)/ Cardiac Diet: Low saturated / low trans fat, no added salt, caffeine restricted    If patient is there for rehab and will be returning home in the near future; reinforce with the patient and the family to follow a low sodium diet (2,000 mg)- avoid using salt at the table, avoid / limit use of canned soups, processed / packaged foods, salted snacks, olives and pickles. Do not use a salt substitute without checking with the doctor. (Mrs. Georgia Melendez is safe to use).        NOTIFY THE DOCTOR THE FIRST DAY OF ONSET OF ANY OF THESE   SYMPTOMS:   Weight gain of 3 pounds or more in 1 day         OR 5 pounds or more in one week  More shortness of breath  More swelling in stomach, legs, ankles or feet  Feeling more tired, No energy  Dry hacky cough  Dizziness  More chest pain / discomfort  Hard time breathing laying down

## 2023-01-19 LAB
AADO2: 56.4 MMHG
ANION GAP SERPL CALCULATED.3IONS-SCNC: 11 MMOL/L (ref 7–16)
B.E.: 8.1 MMOL/L (ref -3–3)
BASOPHILS ABSOLUTE: 0.01 E9/L (ref 0–0.2)
BASOPHILS RELATIVE PERCENT: 0.1 % (ref 0–2)
BUN BLDV-MCNC: 54 MG/DL (ref 6–23)
CALCIUM SERPL-MCNC: 8.5 MG/DL (ref 8.6–10.2)
CHLORIDE BLD-SCNC: 97 MMOL/L (ref 98–107)
CO2: 33 MMOL/L (ref 22–29)
COHB: 0.2 % (ref 0–1.5)
CREAT SERPL-MCNC: 2 MG/DL (ref 0.5–1)
CRITICAL: ABNORMAL
DATE ANALYZED: ABNORMAL
DATE OF COLLECTION: ABNORMAL
EOSINOPHILS ABSOLUTE: 0 E9/L (ref 0.05–0.5)
EOSINOPHILS RELATIVE PERCENT: 0 % (ref 0–6)
FIO2: 30 %
GFR SERPL CREATININE-BSD FRML MDRD: 26 ML/MIN/1.73
GLUCOSE BLD-MCNC: 235 MG/DL (ref 74–99)
HCO3: 34.9 MMOL/L (ref 22–26)
HCT VFR BLD CALC: 26.5 % (ref 34–48)
HEMOGLOBIN: 7.7 G/DL (ref 11.5–15.5)
HHB: 5.8 % (ref 0–5)
IMMATURE GRANULOCYTES #: 0.08 E9/L
IMMATURE GRANULOCYTES %: 0.8 % (ref 0–5)
LAB: ABNORMAL
LYMPHOCYTES ABSOLUTE: 0.85 E9/L (ref 1.5–4)
LYMPHOCYTES RELATIVE PERCENT: 8 % (ref 20–42)
Lab: ABNORMAL
MCH RBC QN AUTO: 26.5 PG (ref 26–35)
MCHC RBC AUTO-ENTMCNC: 29.1 % (ref 32–34.5)
MCV RBC AUTO: 91.1 FL (ref 80–99.9)
METER GLUCOSE: 211 MG/DL (ref 74–99)
METER GLUCOSE: 223 MG/DL (ref 74–99)
METER GLUCOSE: 230 MG/DL (ref 74–99)
METER GLUCOSE: 250 MG/DL (ref 74–99)
METER GLUCOSE: 390 MG/DL (ref 74–99)
METHB: 0.5 % (ref 0–1.5)
MODE: ABNORMAL
MONOCYTES ABSOLUTE: 0.5 E9/L (ref 0.1–0.95)
MONOCYTES RELATIVE PERCENT: 4.7 % (ref 2–12)
MRSA CULTURE ONLY: NORMAL
NEUTROPHILS ABSOLUTE: 9.18 E9/L (ref 1.8–7.3)
NEUTROPHILS RELATIVE PERCENT: 86.4 % (ref 43–80)
O2 CONTENT: 11.5 ML/DL
O2 SATURATION: 94.2 % (ref 92–98.5)
O2HB: 93.5 % (ref 94–97)
OPERATOR ID: 2245
PATIENT TEMP: 37 C
PCO2: 64 MMHG (ref 35–45)
PDW BLD-RTO: 16.3 FL (ref 11.5–15)
PFO2: 2.49 MMHG/%
PH BLOOD GAS: 7.36 (ref 7.35–7.45)
PHOSPHORUS: 4.9 MG/DL (ref 2.5–4.5)
PLATELET # BLD: 210 E9/L (ref 130–450)
PMV BLD AUTO: 10.4 FL (ref 7–12)
PO2: 74.8 MMHG (ref 75–100)
POTASSIUM REFLEX MAGNESIUM: 4.9 MMOL/L (ref 3.5–5)
RBC # BLD: 2.91 E12/L (ref 3.5–5.5)
RI(T): 0.75
SODIUM BLD-SCNC: 141 MMOL/L (ref 132–146)
SOURCE, BLOOD GAS: ABNORMAL
THB: 8.7 G/DL (ref 11.5–16.5)
TIME ANALYZED: 509
WBC # BLD: 10.6 E9/L (ref 4.5–11.5)

## 2023-01-19 PROCEDURE — 82962 GLUCOSE BLOOD TEST: CPT

## 2023-01-19 PROCEDURE — 6370000000 HC RX 637 (ALT 250 FOR IP): Performed by: INTERNAL MEDICINE

## 2023-01-19 PROCEDURE — 6360000002 HC RX W HCPCS: Performed by: NURSE PRACTITIONER

## 2023-01-19 PROCEDURE — 80048 BASIC METABOLIC PNL TOTAL CA: CPT

## 2023-01-19 PROCEDURE — 2500000003 HC RX 250 WO HCPCS: Performed by: INTERNAL MEDICINE

## 2023-01-19 PROCEDURE — 6370000000 HC RX 637 (ALT 250 FOR IP): Performed by: NURSE PRACTITIONER

## 2023-01-19 PROCEDURE — 99232 SBSQ HOSP IP/OBS MODERATE 35: CPT | Performed by: INTERNAL MEDICINE

## 2023-01-19 PROCEDURE — 94660 CPAP INITIATION&MGMT: CPT

## 2023-01-19 PROCEDURE — 2000000000 HC ICU R&B

## 2023-01-19 PROCEDURE — 2580000003 HC RX 258: Performed by: INTERNAL MEDICINE

## 2023-01-19 PROCEDURE — 97530 THERAPEUTIC ACTIVITIES: CPT | Performed by: PHYSICAL THERAPIST

## 2023-01-19 PROCEDURE — 84100 ASSAY OF PHOSPHORUS: CPT

## 2023-01-19 PROCEDURE — 85025 COMPLETE CBC W/AUTO DIFF WBC: CPT

## 2023-01-19 PROCEDURE — 36415 COLL VENOUS BLD VENIPUNCTURE: CPT

## 2023-01-19 PROCEDURE — 94640 AIRWAY INHALATION TREATMENT: CPT

## 2023-01-19 PROCEDURE — 2580000003 HC RX 258: Performed by: NURSE PRACTITIONER

## 2023-01-19 PROCEDURE — 2700000000 HC OXYGEN THERAPY PER DAY

## 2023-01-19 PROCEDURE — 82805 BLOOD GASES W/O2 SATURATION: CPT

## 2023-01-19 PROCEDURE — 97161 PT EVAL LOW COMPLEX 20 MIN: CPT | Performed by: PHYSICAL THERAPIST

## 2023-01-19 RX ADMIN — SUCRALFATE 1 G: 1 TABLET ORAL at 21:22

## 2023-01-19 RX ADMIN — ASPIRIN 81 MG: 81 TABLET, COATED ORAL at 08:22

## 2023-01-19 RX ADMIN — ANTI-FUNGAL POWDER MICONAZOLE NITRATE TALC FREE: 1.42 POWDER TOPICAL at 21:22

## 2023-01-19 RX ADMIN — CARBIDOPA AND LEVODOPA 1 TABLET: 50; 200 TABLET, EXTENDED RELEASE ORAL at 21:21

## 2023-01-19 RX ADMIN — ROPINIROLE HYDROCHLORIDE 1 MG: 1 TABLET, FILM COATED ORAL at 21:21

## 2023-01-19 RX ADMIN — Medication 10 ML: at 21:22

## 2023-01-19 RX ADMIN — ACETAMINOPHEN 650 MG: 325 TABLET ORAL at 11:23

## 2023-01-19 RX ADMIN — IPRATROPIUM BROMIDE AND ALBUTEROL SULFATE 3 ML: .5; 2.5 SOLUTION RESPIRATORY (INHALATION) at 14:29

## 2023-01-19 RX ADMIN — ANTI-FUNGAL POWDER MICONAZOLE NITRATE TALC FREE: 1.42 POWDER TOPICAL at 08:25

## 2023-01-19 RX ADMIN — SODIUM CHLORIDE: 9 INJECTION, SOLUTION INTRAVENOUS at 08:16

## 2023-01-19 RX ADMIN — ROPINIROLE HYDROCHLORIDE 1 MG: 1 TABLET, FILM COATED ORAL at 14:03

## 2023-01-19 RX ADMIN — SODIUM CHLORIDE: 9 INJECTION, SOLUTION INTRAVENOUS at 17:53

## 2023-01-19 RX ADMIN — CEFTRIAXONE 1000 MG: 1 INJECTION, POWDER, FOR SOLUTION INTRAMUSCULAR; INTRAVENOUS at 21:22

## 2023-01-19 RX ADMIN — INSULIN LISPRO 2 UNITS: 100 INJECTION, SOLUTION INTRAVENOUS; SUBCUTANEOUS at 12:03

## 2023-01-19 RX ADMIN — SUCRALFATE 1 G: 1 TABLET ORAL at 14:03

## 2023-01-19 RX ADMIN — SERTRALINE 50 MG: 50 TABLET, FILM COATED ORAL at 08:21

## 2023-01-19 RX ADMIN — ROPINIROLE HYDROCHLORIDE 1 MG: 1 TABLET, FILM COATED ORAL at 08:22

## 2023-01-19 RX ADMIN — APIXABAN 5 MG: 5 TABLET, FILM COATED ORAL at 08:22

## 2023-01-19 RX ADMIN — CARBIDOPA AND LEVODOPA 1 TABLET: 50; 200 TABLET, EXTENDED RELEASE ORAL at 14:03

## 2023-01-19 RX ADMIN — SUCRALFATE 1 G: 1 TABLET ORAL at 08:21

## 2023-01-19 RX ADMIN — MIRTAZAPINE 7.5 MG: 15 TABLET, FILM COATED ORAL at 21:22

## 2023-01-19 RX ADMIN — BUDESONIDE 500 MCG: 0.5 SUSPENSION RESPIRATORY (INHALATION) at 18:03

## 2023-01-19 RX ADMIN — METOPROLOL SUCCINATE 25 MG: 25 TABLET, FILM COATED, EXTENDED RELEASE ORAL at 21:22

## 2023-01-19 RX ADMIN — DILTIAZEM HYDROCHLORIDE 60 MG: 30 TABLET, FILM COATED ORAL at 10:55

## 2023-01-19 RX ADMIN — IPRATROPIUM BROMIDE AND ALBUTEROL SULFATE 3 ML: .5; 2.5 SOLUTION RESPIRATORY (INHALATION) at 18:03

## 2023-01-19 RX ADMIN — INSULIN GLARGINE 13 UNITS: 100 INJECTION, SOLUTION SUBCUTANEOUS at 08:04

## 2023-01-19 RX ADMIN — INSULIN GLARGINE 13 UNITS: 100 INJECTION, SOLUTION SUBCUTANEOUS at 21:27

## 2023-01-19 RX ADMIN — INSULIN LISPRO 1 UNITS: 100 INJECTION, SOLUTION INTRAVENOUS; SUBCUTANEOUS at 08:04

## 2023-01-19 RX ADMIN — PANTOPRAZOLE SODIUM 40 MG: 40 TABLET, DELAYED RELEASE ORAL at 05:13

## 2023-01-19 RX ADMIN — MONTELUKAST SODIUM 10 MG: 10 TABLET, FILM COATED ORAL at 21:21

## 2023-01-19 RX ADMIN — IPRATROPIUM BROMIDE AND ALBUTEROL SULFATE 3 ML: .5; 2.5 SOLUTION RESPIRATORY (INHALATION) at 06:08

## 2023-01-19 RX ADMIN — DILTIAZEM HYDROCHLORIDE 60 MG: 30 TABLET, FILM COATED ORAL at 21:21

## 2023-01-19 RX ADMIN — ARFORMOTEROL TARTRATE 15 MCG: 15 SOLUTION RESPIRATORY (INHALATION) at 06:08

## 2023-01-19 RX ADMIN — METOPROLOL SUCCINATE 25 MG: 25 TABLET, FILM COATED, EXTENDED RELEASE ORAL at 08:22

## 2023-01-19 RX ADMIN — BUDESONIDE 500 MCG: 0.5 SUSPENSION RESPIRATORY (INHALATION) at 06:08

## 2023-01-19 RX ADMIN — INSULIN LISPRO 1 UNITS: 100 INJECTION, SOLUTION INTRAVENOUS; SUBCUTANEOUS at 17:20

## 2023-01-19 RX ADMIN — Medication 10 ML: at 08:17

## 2023-01-19 RX ADMIN — CARBIDOPA AND LEVODOPA 1 TABLET: 50; 200 TABLET, EXTENDED RELEASE ORAL at 08:21

## 2023-01-19 RX ADMIN — ARFORMOTEROL TARTRATE 15 MCG: 15 SOLUTION RESPIRATORY (INHALATION) at 18:03

## 2023-01-19 RX ADMIN — SUCRALFATE 1 G: 1 TABLET ORAL at 17:47

## 2023-01-19 RX ADMIN — APIXABAN 5 MG: 5 TABLET, FILM COATED ORAL at 21:21

## 2023-01-19 RX ADMIN — ATORVASTATIN CALCIUM 20 MG: 20 TABLET, FILM COATED ORAL at 21:21

## 2023-01-19 RX ADMIN — LORAZEPAM 0.5 MG: 0.5 TABLET ORAL at 21:21

## 2023-01-19 RX ADMIN — DOXYCYCLINE 100 MG: 100 INJECTION, POWDER, LYOPHILIZED, FOR SOLUTION INTRAVENOUS at 05:18

## 2023-01-19 RX ADMIN — IPRATROPIUM BROMIDE AND ALBUTEROL SULFATE 3 ML: .5; 2.5 SOLUTION RESPIRATORY (INHALATION) at 10:06

## 2023-01-19 RX ADMIN — DOXYCYCLINE 100 MG: 100 INJECTION, POWDER, LYOPHILIZED, FOR SOLUTION INTRAVENOUS at 17:49

## 2023-01-19 ASSESSMENT — PAIN DESCRIPTION - ORIENTATION
ORIENTATION: RIGHT
ORIENTATION: RIGHT

## 2023-01-19 ASSESSMENT — PAIN SCALES - GENERAL
PAINLEVEL_OUTOF10: 3
PAINLEVEL_OUTOF10: 1
PAINLEVEL_OUTOF10: 0
PAINLEVEL_OUTOF10: 0

## 2023-01-19 ASSESSMENT — PAIN DESCRIPTION - LOCATION
LOCATION: KNEE
LOCATION: KNEE

## 2023-01-19 NOTE — PLAN OF CARE
Problem: Respiratory - Adult  Goal: Achieves optimal ventilation and oxygenation  1/18/2023 2308 by Latanya Tolbert RN  Outcome: Progressing     Problem: Cardiovascular - Adult  Goal: Maintains optimal cardiac output and hemodynamic stability  1/18/2023 2308 by Latanya Tolbert RN  Outcome: Progressing     Problem: Genitourinary - Adult  Goal: Absence of urinary retention  1/18/2023 2308 by Latanya Tolbert RN  Outcome: Progressing     Problem: Safety - Adult  Goal: Free from fall injury  1/18/2023 2308 by Latanya Tolbert RN  Outcome: Progressing

## 2023-01-19 NOTE — CONSULTS
[facility] MD Carmelo Beckford MD Cesario Sams, MD Prudy Pick, BRADLEY Devlin, NUNU Alonzo, BRADLEY  Consultation  1/19/2023    Thank you for consult  Full note dictated, to follow  Briefly, 67 y.o. woman known to service, admitted with dyspnea and hypotension, subsequent diagnosis sepsis, acute on chronic hypoxic and hypercapnic respiratory failure in the setting of COPD exacerbation and pneumonia    A/R:  1. Acute kidney injury, secondary to prerenal azotemia. Creatinine and UO are improving with IV fluid resuscitation    2. CKD stage III, Baseline creatinine 1.4 to 1.7 mg/dL, secondary to diabetic nephropathy and renal microvascular atherosclerotic disease    3. Anemia due to CKD, phlebotomy associated prolonged hospitalization    4. Acute hypercapnic and hypoxic respiratory failure due to COPD exacerbation and pneumonia. Does not appear hypervolemic.     5.  Morbid obesity, BMI 50.6 kg per metered square      Continue IV fluid resuscitation, though will increase the rate somewhat  Repeat BMP this evening, adjust IV fluid content and rate as warranted  Continue intensive supportive care  Follow labs, Amy Gold MD

## 2023-01-19 NOTE — PROGRESS NOTES
Physical Therapy  Physical Therapy Initial Evaluation/Plan of Care    Room #:  IC01/IC01-01  Patient Name: Corinne Magana  YOB: 1950  MRN: [id number]    Date of Service: 1/19/2023     Tentative placement recommendation: Subacute Rehab  Equipment recommendation:  To be determined      Evaluating Physical Therapist: Umer Huber, PT, DPT #[id number]      Specific Provider Orders/Date/Referring Provider :     01/19/23 0745    PT eval and treat  Start:  01/19/23 0745,   End:  01/19/23 0745,   ONE TIME,   Standing Count:  1 Occurrences,   Jericho Castle MD Acknowledge New     Admitting Diagnosis:   NSTEMI (non-ST elevated myocardial infarction) [facility]) [I21.4]  Atrial fibrillation with rapid ventricular response (HCC) [I48.91]  Recurrent right pleural effusion [J90]  Hypotension, unspecified hypotension type [I95.9]  Acute on chronic respiratory failure with hypoxia and hypercapnia (HCC) [J96.21, J96.22]      Surgery: none  Visit Diagnoses         Codes    Recurrent right pleural effusion     J90    NSTEMI (non-ST elevated myocardial infarction) ([facility] [address]     I21.4            Patient Active Problem List   Diagnosis    Depression with anxiety    Osteoporosis    Asthma    Hyperlipidemia    Mitral regurgitation    Obstructive sleep apnea syndrome    Psoriasis    Diabetes mellitus ([address]    Parkinson's disease ([address]    Primary hypertension    Microalbuminuria    Morbid obesity (HCC)    RLS (restless legs syndrome)    Generalized weakness    Inability to walk    Hypothyroidism    Chest pain    Acute asthma exacerbation    Asthma exacerbation, mild    Paroxysmal atrial fibrillation (HCC)    Atrial fibrillation with rapid ventricular response (HCC)    Acute on chronic congestive heart failure ([address]    Coronary artery disease involving native coronary artery of native heart without angina pectoris    Dysphagia    Hepatosplenomegaly    Acute decompensated heart failure (HCC)    Acute diastolic (congestive) heart failure (HCC)    Nonrheumatic tricuspid valve regurgitation    Tobacco abuse    Recurrent syncope    Septic shock (HCC)    Seizure-like activity (HCC)    Acute kidney injury ([facility] [address]    Pancytopenia (HCC)    Thrombocytopenia (HCC)    Encephalopathy    Acute respiratory failure with hypoxia (HCC)    Lactic acidosis    Delirium    Acute on chronic anemia    Pleural effusion, right    Acute respiratory failure with hypoxia and hypercapnia (HCC)    Acute confusion    Acute on chronic diastolic heart failure (HCC)    Macrocytosis    Other specified anemias    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[facility] [address]    Puerperal sepsis with acute hypoxic respiratory failure without septic shock (HCC)    Pulmonary HTN (HCC)    Chronic anticoagulation    RVF (right ventricular failure) (HCC)    Metabolic alkalosis    Sepsis (HCC)    COPD exacerbation (HCC)    Acute on chronic respiratory failure with hypercapnia (HCC)    Persistent atrial fibrillation (HCC)    Hypercapnic respiratory failure (HCC)    Acute on chronic respiratory failure (HCC)    Hyperkalemia    Heart failure (HCC)    Acute renal insufficiency    Hypotension    Anemia    Acute on chronic respiratory failure with hypoxia and hypercapnia (HCC)        ASSESSMENT of Current Deficits Patient exhibits decreased strength, balance, and endurance impairing functional mobility, transfers, gait , gait distance, and tolerance to activity. Pt required MaxA for bed mobility and Min-ModA to sit EOB for 10 minutes. Pt demonstrated fair- seated balance and was not safe to attempt standing during session. Pt also required MaxA to scoot toward [facility] with bed in Trendelenburg position.         PHYSICAL THERAPY  PLAN OF CARE       Physical therapy plan of care is established based on physician order,  patient diagnosis and clinical assessment    Current Treatment Recommendations:    -Bed Mobility: Lower extremity exercises  and Trunk control activities   -Sitting Balance: Incorporate reaching activities to activate trunk muscles , Hands on support to maintain midline , Facilitate active trunk muscle engagement , Facilitate postural control in all planes , and Engage in core activities to allow for movement within base of support   -Standing Balance: Perform strengthening exercises in standing to promote motor control with or without upper extremity support , Instruct patient on adequate base of support to maintain balance, and Challenge balance utilizing reaching  activities beyond center of gravity    -Transfers: Provide instruction on proper hand and foot position for adequate transfer of weight onto lower extremities and use of gait device if needed, Cues for hand placement, technique and safety. Provide stabilization to prevent fall , Facilitate weight shift forward on to lower extremities and provide necessary stabilization of bilateral lower extremities , Support transfer of weight on to lower extremities, and Assist with extension of knees trunk and hip to accept weight transfer   -Gait: Gait training, Standing activities to improve: base of support, weight shift, weight bearing , Exercises to improve trunk control, Exercises to improve hip and knee control, Performance of protected weight bearing activities, and Activities to increase weight bearing   -Endurance: Utilize Supervised activities to increase level of endurance to allow for safe functional mobility including transfers and gait  and Use graduated activities to promote good breathing techniques and provide support and education to maximize respiratory function    PT long term treatment goals are located in below grid    Patient and or family understand(s) diagnosis, prognosis, and plan of care. Frequency of treatments: Patient will be seen  3-5 times/week.          Prior Level of Function: Patient ambulated with wheeled walker for short distances  Rehab Potential: fair + for baseline    Past medical history:   Past Medical History: Diagnosis Date    A-fib [facility])     Acute on chronic congestive heart failure (HCC)     Anxiety     Asthma     CAD (coronary artery disease) 01/21/2016    Cancer ([facility] [address]  breast ca 2006    right lumpectomy    Chronic kidney disease     nephrolithiasis    COPD exacerbation ([facility] [address] 10/12/2022    Depression     Diabetes mellitus ([facility] [address]     H/O mammogram     Hx MRSA infection     toe infection january 2012    Hyperlipidemia     Hypertension     Lateral epicondylitis     Morbid obesity (HCC)     SERENA on CPAP     Oxygen dependent     Parkinson's disease ([facility] [address]     Tubal ligation status      Past Surgical History:   Procedure Laterality Date    BREAST LUMPECTOMY      BREAST REDUCTION SURGERY      CARDIAC CATHETERIZATION  4/28/2014    Dr. Aury Kong  01/11/2022    Dr. Nolan Oliver  7/29/15    CT GUIDED CHEST TUBE  7/8/2022    CT GUIDED CHEST TUBE 7/8/2022 Stacy Fernández MD [facility]    ENDOSCOPY, COLON, DIAGNOSTIC  7/19/15    GALLBLADDER SURGERY      LUMBAR LAMINECTOMY      TOE AMPUTATION      TONSILLECTOMY      UPPER GASTROINTESTINAL ENDOSCOPY      UPPER GASTROINTESTINAL ENDOSCOPY N/A 7/19/2022    EGD ESOPHAGOGASTRODUODENOSCOPY performed by Lauren Velasquez MD at [address]:    Precautions:  Up with assistance, falls, O2, and morbid obesity      Social history: Patient from SNF    Equipment owned: Wheelchair, Wheeled Walker, and [address] [place] [address] 14 Mobility Inpatient   How much difficulty turning over in bed?: A Lot  How much difficulty sitting down on / standing up from a chair with arms?: Unable  How much difficulty moving from lying on back to sitting on side of bed?: A Lot  How much help from another person moving to and from a bed to a chair?: Total  How much help from another person needed to walk in hospital room?: Total  How much help from another person for climbing 3-5 steps with a railing?: Total  AM-PAC Inpatient Mobility Raw Score : 8  AM-PAC Inpatient T-Scale Score : 28.52  Mobility Inpatient CMS 0-100% Score: 86.62  Mobility Inpatient CMS G-Code Modifier :     Nursing cleared patient for PT evaluation. The admitting diagnosis and active problem list as listed above have been reviewed prior to the initiation of this evaluation. OBJECTIVE;   Initial Evaluation  Date: 1/19/2023 Treatment Date:     Short Term/ Long Term   Goals   Was pt agreeable to Eval/treatment? Yes  To be met in 3 days   Pain level   0/10       Bed Mobility    Rolling: Maximal assist of 1    Supine to sit: Maximal assist of 1    Sit to supine: Maximal assist of 1    Scooting: Maximal assist of 1    Rolling: Minimal assist of 1    Supine to sit: Minimal assist of 1    Sit to supine: Minimal assist of 1    Scooting: Minimal assist of 1     Transfers Sit to stand: Not assessed  d/t fair- seated balance and pt declining standing  Sit to stand:  Moderate assist of 1    Ambulation    not assessed     > 15 feet using  wheeled walker with Moderate assist of 1    Stair negotiation: ascended and descended   Not assessed           ROM Within functional limits    Increase range of motion 10% of affected joints    Strength BUE:  refer to OT eval  RLE:  4-/5  LLE:  4-/5  Increase strength in affected mm groups by 1/3 grade   Balance Sitting EOB:  fair -  Dynamic Standing:  not assessed   Sitting EOB:  fair   Dynamic Standing: fair - with [employer]     Patient is Alert & Oriented x person, place, time, and situation and follows directions    Sensation:  Patient  denies numbness/tingling   Edema:  no   Endurance: poor +    Vitals: room air   Blood Pressure at rest  Blood Pressure during session    Heart Rate at rest  Heart Rate during session    SPO2 at rest %  SPO2 during session %     Patient education  Patient educated on role of Physical Therapy, risks of immobility, safety and plan of care, energy conservation,  importance of mobility while in hospital , ankle pumps, quad set and glut set for edema control, blood clot prevention, importance and purpose of adaptive device and adjusted to proper height for the patient. , and safety      Patient response to education:   Pt verbalized understanding Pt demonstrated skill Pt requires further education in this area   Yes Partial Yes      Treatment:  Patient practiced and was instructed/facilitated in the following treatment: Patient Sat edge of bed 10 minutes with Min/ModA to increase dynamic sitting balance and activity tolerance. Pt performed bed mobility, sat EOB, scooted toward HOB. Therapeutic Exercises:  not performed      At end of session, patient in bed with alarm call light and phone within reach,  all lines and tubes intact, nursing notified. Patient would benefit from continued skilled Physical Therapy to improve functional independence and quality of life. Patient's/ family goals   rehab    Time in  905  Time out  935    Total Treatment Time  10 minutes    Evaluation time includes thorough review of current medical information, gathering information on past medical history/social history and prior level of function, completion of standardized testing/informal observation of tasks, assessment of data, and development of Plan of care and goals.      CPT codes:  Low Complexity PT evaluation (04655)  Therapeutic activities (27133)   10 minutes  1 unit(s)    Yamile Curran PT

## 2023-01-19 NOTE — PROGRESS NOTES
Admitting Date and Time: 1/17/2023  5:16 PM  Admit Dx: NSTEMI (non-ST elevated myocardial infarction) ([facility] [address] [I21.4]  Atrial fibrillation with rapid ventricular response (HCC) [I48.91]  Recurrent right pleural effusion [J90]  Hypotension, unspecified hypotension type [I95.9]  Acute on chronic respiratory failure with hypoxia and hypercapnia (HCC) [J96.21, J96.22]    Subjective:    Pt feels less sob,   Per RN: afrvr    ROS: denies fever, chills, cp, sob, n/v, HA unless stated above.      sucralfate  1 g Oral 4x Daily    rOPINIRole  1 mg Oral TID    atorvastatin  20 mg Oral Nightly    ipratropium-albuterol  1 vial Inhalation 4x Daily    insulin glargine  13 Units SubCUTAneous BID    LORazepam  0.5 mg Oral Nightly    [Held by provider] bumetanide  2 mg Oral Daily    metoprolol succinate  25 mg Oral BID    insulin lispro  0-4 Units SubCUTAneous TID WC    insulin lispro  0-4 Units SubCUTAneous Nightly    budesonide  0.5 mg Nebulization BID    doxycycline (VIBRAMYCIN) IV  100 mg IntraVENous Q12H    digoxin  250 mcg IntraVENous Once    cefTRIAXone (ROCEPHIN) IV  1,000 mg IntraVENous Q24H    sodium chloride flush  10 mL IntraVENous 2 times per day    miconazole   Topical BID    arformoterol tartrate  15 mcg Nebulization BID    pantoprazole  40 mg Oral QAM AC    apixaban  5 mg Oral BID    mirtazapine  7.5 mg Oral Nightly    aspirin  81 mg Oral Daily    carbidopa-levodopa  1 tablet Oral TID    sertraline  50 mg Oral Daily    montelukast  10 mg Oral Nightly    [Held by provider] midodrine  5 mg Oral TID WC     loperamide, 2 mg, 4x Daily PRN  sodium chloride flush, 10 mL, PRN  sodium chloride, , PRN  promethazine, 12.5 mg, Q6H PRN   Or  ondansetron, 4 mg, Q6H PRN  polyethylene glycol, 17 g, Daily PRN  acetaminophen, 650 mg, Q6H PRN   Or  acetaminophen, 650 mg, Q6H PRN  glucose, 4 tablet, PRN  dextrose bolus, 125 mL, PRN   Or  dextrose bolus, 250 mL, PRN  glucagon (rDNA), 1 mg, PRN  dextrose, , Continuous PRN Objective:    BP (!) 145/87   Pulse (!) 120   Temp 99.5 °F (37.5 °C) (Bladder)   Resp 20   Ht 5' (1.524 m)   Wt 262 lb 1.6 oz (118.9 kg)   SpO2 93%   BMI 51.19 kg/m²   General Appearance: alert and oriented to person, place and time and in no acute distress  Skin: warm and dry  Head: normocephalic and atraumatic  Eyes: pupils equal, round, and reactive to light, extraocular eye movements intact, conjunctivae normal  Neck: neck supple and non tender without mass   Pulmonary/Chest: clear to auscultation bilaterally- no wheezes, rales or rhonchi, normal air movement, no respiratory distress  Cardiovascular: normal rate, normal S1 and S2 and no carotid bruits  Abdomen: soft, non-tender, non-distended, normal bowel sounds, no masses or organomegaly  Extremities: no cyanosis, no clubbing and no  edema  Neurologic: no cranial nerve deficit and speech normal      Recent Labs     01/16/23  1022 01/17/23  1723 01/18/23  0415    141 141   K 4.5 4.8 5.2*   CL 93* 94* 97*   CO2 39* 37* 39*   BUN 38* 44* 47*   CREATININE 2.1* 2.1* 2.2*   GLUCOSE 79 94 147*   CALCIUM 9.1 9.3 8.3*       Recent Labs     01/16/23  1022 01/17/23  1723   ALKPHOS 96 98   PROT 6.3* 6.1*   LABALBU 3.8 3.6   BILITOT 0.4 0.5   AST 10 9   ALT <5 <5       Recent Labs     01/16/23  1022 01/17/23  1723 01/18/23  0415   WBC 8.9 11.0 9.9   RBC 3.43* 3.28* 2.84*   HGB 8.7* 8.1* 7.3*   HCT 32.6* 31.1* 26.5*   MCV 95.0 94.8 93.3   MCH 25.4* 24.7* 25.7*   MCHC 26.7* 26.0* 27.5*   RDW 16.5* 16.2* 16.0*    195 174   MPV 10.7 10.4 10.3           Radiology:   CT ABDOMEN PELVIS WO CONTRAST Additional Contrast? None   Final Result   No nephroureterolithiasis or hydronephrosis. Splenomegaly. Small volume of perihepatic ascites. At least moderate-sized partially imaged right pleural effusion with   bibasilar atelectasis.       Several locules of gas which appear to be within the endometrium at the level   of the uterine fundus, of uncertain etiology or clinical significance. Please correlate clinically. Diffuse anasarca throughout the soft tissues. Cardiomegaly. XR CHEST PORTABLE   Final Result   Stable right pleural effusion. Assessment:  Principal Problem:    Acute on chronic respiratory failure with hypoxia and hypercapnia (HCC)  Resolved Problems:    * No resolved hospital problems. *      Plan: History of present illness from History and Physical:  This is a 67 y.o. female  has a past medical history of A-fib ([facility] [address] Acute on chronic congestive heart failure ([address] Anxiety, Asthma, CAD (coronary artery disease), Cancer ([address] Chronic kidney disease, COPD exacerbation ([address] Depression, Diabetes mellitus ([address] H/O mammogram, Hx MRSA infection, Hyperlipidemia, Hypertension, Lateral epicondylitis, Morbid obesity ([address] SERENA on CPAP, Oxygen dependent, Parkinson's disease ([facility] [address] and Tubal ligation status. presented with SOB, hypotension  for last few days prior to arrival to the hospital. SOB is associated with some cough, nonproductive but no fever or chills reported. Initially SOB was mild, intermittent but gradually getting more persistent. Started gradually. Exacerbated by general activity or exertion. Relieved only partially by rest. In ED patient was found to be hypotensive with Afib RVR and respiratory failure and was placed on BIPAP. BP improved after midodrine dose. The patient was seen and examined at bedside, appears alert and awake with no acute distress and is able to answer simple  questions.  On direct questioning, patient denied any  resting ongoing chest pain, hemoptysis, productive cough, fever, ongoing palpitation, active abdominal pain, hematemesis, rectal bleeding, blessing, hematuria, any other  and GI complaints, and any new focal neuro deficits     Acute exacerbation of COPD with hypercapnic respiratory failure  No pna on image; bipap  Sm, abcc, o2, nebs  P A fib eliquis no other dvt prophy needed, rate control  Then RVR in pm of 1/18 so ccb gtts  Uti: empiric abx: until Cx resulted  Dm: diabetic diet, ssc  Parkinson l dopa pt, consier place  Full code    NOTE: This report was transcribed using voice recognition software. Every effort was made to ensure accuracy; however, inadvertent computerized transcription errors may be present.      Electronically signed by Lisandro Elizabeth MD on 1/18/2023 at 7:55 PM

## 2023-01-19 NOTE — PROGRESS NOTES
Pulmonary/Critical Care Consult Note    CHIEF COMPLAINT: Shortness of breath and hypotension      IMPRESSION/ PLAN:    Acute on chronic hypoxic and hypercapnic respiratory failure  COPD   Obesity induced hypoventilation syndrome  SERENA noncompliant with CPAP  On BiPAP overnight   Much improved  States at baseline   ABG normalized  DuoNeb and Brovana    Steady improvement    CHF - Diastolic  Pleural effusions  Very elevated pro-BNP    Hypotension  History of   Improved / resolved      Severe pulmonary hypertension  Secondary to above      SHANTANU on CKD  Hyperkalemia  Close to baseline  Nephrology consultation  Lokelma x 1  Follow     Chronic atrial fibrillation with RVR  On metoprolol  Started on dilt drip overnight   Rate controlled  Transition to PO  Eliquis    UTI  ON Ceftriaxone  Lactate normal    Type 2 diabetes   Insulin sliding scale        Normocytic hypochromic anemia-appears chronic    Intertrigo  Wound culture to left lower leg  Miconazole powder to rash on lower abdominal folds twice daily      Overall improved     __________________________________________    Subjective  Feeling better  States at baseline    Events last 24 hr  AF with RVR  Started on dilt drip      HISTORY OF PRESENT ILLNESS: Patient is a 78-year-old female with history of COPD, CAD, breast cancer, CKD, hypertension, hyperlipidemia, diabetes mellitus, obstructive sleep apnea noncompliant with CPAP, oxygen dependence, and Parkinson's disease. Patient presented to the ED on 1/17/2023 for increased shortness of breath over the past 2 days. Patient placed on BiPAP on presentation to the ED and initial ABG was likely mixed or venous. Repeat ABG on AVAPS showed a pH of 7.257, PCO2 90, PO2 183.4, HCO3 39.2, and SPO2 98.6%. The patient also received 1 L normal saline bolus and midodrine 10 mg p.o. x1 for hypotension which the patient takes at the nursing home 3 times daily.   The patient did respond well to the IV fluids and does not require vasopressor support at this time. Lactic acid normal at 1.7. According to the patient's family she is supposed to wear CPAP at night but is noncompliant. She uses supplemental oxygen as needed. Portable chest x-ray shows stable right pleural effusion and no other acute cardiopulmonary process. ALLERGY:  Ciprofloxacin, Codeine, and Digoxin and related    FAMILY HISTORY:  Family History   Problem Relation Age of Onset    Cancer Mother         Lung Ca    Cancer Father         lung Ca    Diabetes Sister     Heart Disease Sister     Seizures Son     Other Brother         sepsis       SOCIAL HISTORY:  Social History     Socioeconomic History    Marital status:       Spouse name: Not on file    Number of children: Not on file    Years of education: Not on file    Highest education level: Not on file   Occupational History    Not on file   Tobacco Use    Smoking status: Never    Smokeless tobacco: Never   Vaping Use    Vaping Use: Never used   Substance and Sexual Activity    Alcohol use: No    Drug use: No    Sexual activity: Never   Other Topics Concern    Not on file   Social History Narrative    Not on file     Social Determinants of Health     Financial Resource Strain: Not on file   Food Insecurity: Not on file   Transportation Needs: Not on file   Physical Activity: Not on file   Stress: Not on file   Social Connections: Not on file   Intimate Partner Violence: Not on file   Housing Stability: Not on file       MEDICAL HISTORY:  Past Medical History:   Diagnosis Date    A-fib [facility])     Acute on chronic congestive heart failure ([facility] [address]     Anxiety     Asthma     CAD (coronary artery disease) 01/21/2016    Cancer ([facility] [address]  breast ca 2006    right lumpectomy    Chronic kidney disease     nephrolithiasis    COPD exacerbation ([facility] [address] 10/12/2022    Depression     Diabetes mellitus ([facility] [address]     H/O mammogram     Hx MRSA infection     toe infection january 2012    Hyperlipidemia     Hypertension     Lateral epicondylitis Morbid obesity (HCC)     SERENA on CPAP     Oxygen dependent     Parkinson's disease ([facility] [address]     Tubal ligation status        MEDICATIONS:   dilTIAZem  60 mg Oral 3 times per day    sucralfate  1 g Oral 4x Daily    rOPINIRole  1 mg Oral TID    atorvastatin  20 mg Oral Nightly    ipratropium-albuterol  1 vial Inhalation 4x Daily    insulin glargine  13 Units SubCUTAneous BID    LORazepam  0.5 mg Oral Nightly    [Held by provider] bumetanide  2 mg Oral Daily    metoprolol succinate  25 mg Oral BID    insulin lispro  0-4 Units SubCUTAneous TID WC    insulin lispro  0-4 Units SubCUTAneous Nightly    budesonide  0.5 mg Nebulization BID    doxycycline (VIBRAMYCIN) IV  100 mg IntraVENous Q12H    cefTRIAXone (ROCEPHIN) IV  1,000 mg IntraVENous Q24H    sodium chloride flush  10 mL IntraVENous 2 times per day    miconazole   Topical BID    arformoterol tartrate  15 mcg Nebulization BID    pantoprazole  40 mg Oral QAM AC    apixaban  5 mg Oral BID    mirtazapine  7.5 mg Oral Nightly    aspirin  81 mg Oral Daily    carbidopa-levodopa  1 tablet Oral TID    sertraline  50 mg Oral Daily    montelukast  10 mg Oral Nightly    [Held by provider] midodrine  5 mg Oral TID       sodium chloride      sodium chloride 100 mL/hr at 01/19/23 0816    dextrose           PHYSICAL EXAM:  Vitals:    01/19/23 1300   BP: (!) 150/86   Pulse: (!) 105   Resp: 20   Temp:    SpO2: 94%     FiO2 : 30 %  O2 Flow Rate (L/min): 3 L/min  O2 Device: Nasal cannula    Constitutional: No fever, chills, diaphoresis  HEENT: No head lesions, PERRL, EOMI, mouth without lesions, no nasal lesions, no cervical adenopathy palpated   Respiratory: Lungs with equal breath sounds bilaterally diminished with no  wheezes, no accessory muscle use   CV: Rapid rate and irregular rhythm, no murmurs, JVD, bilateral leg edema  Abdomen: Soft, obese, non tender, + bowel sounds, no lesions   Skin: Adequate turgor, no rash, capillary refill <2 seconds, rash noted to bilateral lower abdominal folds, wound noted to left anterior shin  Extremities: Muscular strength 4/4 in 4 limbs, moves 4 limbs spontaneously, distal pulses present   Neurology: Awake and alert, follows commands, moves 4 limbs on command and spontaneously, equal sensation, no dysmetria, neck is supple, no meningitic signs present.       LABS:  WBC   Date Value Ref Range Status   01/19/2023 10.6 4.5 - 11.5 E9/L Final   01/18/2023 9.9 4.5 - 11.5 E9/L Final   01/17/2023 11.0 4.5 - 11.5 E9/L Final     Hemoglobin   Date Value Ref Range Status   01/19/2023 7.7 (L) 11.5 - 15.5 g/dL Final   01/18/2023 7.3 (L) 11.5 - 15.5 g/dL Final   01/17/2023 8.1 (L) 11.5 - 15.5 g/dL Final     Hematocrit   Date Value Ref Range Status   01/19/2023 26.5 (L) 34.0 - 48.0 % Final   01/18/2023 26.5 (L) 34.0 - 48.0 % Final   01/17/2023 31.1 (L) 34.0 - 48.0 % Final     MCV   Date Value Ref Range Status   01/19/2023 91.1 80.0 - 99.9 fL Final   01/18/2023 93.3 80.0 - 99.9 fL Final   01/17/2023 94.8 80.0 - 99.9 fL Final     Platelets   Date Value Ref Range Status   01/19/2023 210 130 - 450 E9/L Final   01/18/2023 174 130 - 450 E9/L Final   01/17/2023 195 130 - 450 E9/L Final     Sodium   Date Value Ref Range Status   01/19/2023 141 132 - 146 mmol/L Final   01/18/2023 141 132 - 146 mmol/L Final   01/17/2023 141 132 - 146 mmol/L Final     Potassium   Date Value Ref Range Status   01/17/2023 4.8 3.5 - 5.0 mmol/L Final   01/16/2023 4.4 3.5 - 5.0 mmol/L Final     Potassium reflex Magnesium   Date Value Ref Range Status   01/19/2023 4.9 3.5 - 5.0 mmol/L Final   01/18/2023 5.2 (H) 3.5 - 5.0 mmol/L Final   01/16/2023 4.5 3.5 - 5.0 mmol/L Final     Chloride   Date Value Ref Range Status   01/19/2023 97 (L) 98 - 107 mmol/L Final   01/18/2023 97 (L) 98 - 107 mmol/L Final   01/17/2023 94 (L) 98 - 107 mmol/L Final     CO2   Date Value Ref Range Status   01/19/2023 33 (H) 22 - 29 mmol/L Final   01/18/2023 39 (H) 22 - 29 mmol/L Final   01/17/2023 37 (H) 22 - 29 mmol/L Final BUN   Date Value Ref Range Status   01/19/2023 54 (H) 6 - 23 mg/dL Final   01/18/2023 47 (H) 6 - 23 mg/dL Final   01/17/2023 44 (H) 6 - 23 mg/dL Final     Creatinine   Date Value Ref Range Status   01/19/2023 2.0 (H) 0.5 - 1.0 mg/dL Final   01/18/2023 2.2 (H) 0.5 - 1.0 mg/dL Final   01/17/2023 2.1 (H) 0.5 - 1.0 mg/dL Final     Glucose   Date Value Ref Range Status   01/19/2023 235 (H) 74 - 99 mg/dL Final   01/18/2023 147 (H) 74 - 99 mg/dL Final   01/17/2023 94 74 - 99 mg/dL Final     Calcium   Date Value Ref Range Status   01/19/2023 8.5 (L) 8.6 - 10.2 mg/dL Final   01/18/2023 8.3 (L) 8.6 - 10.2 mg/dL Final   01/17/2023 9.3 8.6 - 10.2 mg/dL Final     Total Protein   Date Value Ref Range Status   01/17/2023 6.1 (L) 6.4 - 8.3 g/dL Final   01/16/2023 6.3 (L) 6.4 - 8.3 g/dL Final   12/30/2022 5.8 (L) 6.4 - 8.3 g/dL Final     Albumin   Date Value Ref Range Status   01/17/2023 3.6 3.5 - 5.2 g/dL Final   01/16/2023 3.8 3.5 - 5.2 g/dL Final   12/30/2022 3.6 3.5 - 5.2 g/dL Final     Total Bilirubin   Date Value Ref Range Status   01/17/2023 0.5 0.0 - 1.2 mg/dL Final   01/16/2023 0.4 0.0 - 1.2 mg/dL Final   12/30/2022 0.4 0.0 - 1.2 mg/dL Final     Alkaline Phosphatase   Date Value Ref Range Status   01/17/2023 98 35 - 104 U/L Final   01/16/2023 96 35 - 104 U/L Final   12/30/2022 94 35 - 104 U/L Final     AST   Date Value Ref Range Status   01/17/2023 9 0 - 31 U/L Final   01/16/2023 10 0 - 31 U/L Final   12/30/2022 11 0 - 31 U/L Final     ALT   Date Value Ref Range Status   01/17/2023 <5 0 - 32 U/L Final   01/16/2023 <5 0 - 32 U/L Final   12/30/2022 <5 0 - 32 U/L Final     Est, Glom Filt Rate   Date Value Ref Range Status   01/19/2023 26 >=60 mL/min/1.73 Final     Comment:     Pediatric calculator link  [email or web address]. [email or web address]  Effective Oct 3, 2022  These results are not intended for use in patients  <25years of age.   eGFR results are calculated without  a race factor using the 2021 CKD-EPI equation. Careful  clinical correlation is recommended, particularly when  comparing to results calculated using previous equations. The CKD-EPI equation is less accurate in patients with  extremes of muscle mass, extra-renal metabolism of  creatinine, excessive creatinine ingestion, or following  therapy that affects renal tubular secretion. 01/18/2023 23 >=60 mL/min/1.73 Final     Comment:     Pediatric calculator link  [email or web address]. [email or web address]  Effective Oct 3, 2022  These results are not intended for use in patients  <25years of age. eGFR results are calculated without  a race factor using the 2021 CKD-EPI equation. Careful  clinical correlation is recommended, particularly when  comparing to results calculated using previous equations. The CKD-EPI equation is less accurate in patients with  extremes of muscle mass, extra-renal metabolism of  creatinine, excessive creatinine ingestion, or following  therapy that affects renal tubular secretion. 01/17/2023 24 >=60 mL/min/1.73 Final     Comment:     Pediatric calculator link  [email or web address]. [email or web address]  Effective Oct 3, 2022  These results are not intended for use in patients  <25years of age. eGFR results are calculated without  a race factor using the 2021 CKD-EPI equation. Careful  clinical correlation is recommended, particularly when  comparing to results calculated using previous equations. The CKD-EPI equation is less accurate in patients with  extremes of muscle mass, extra-renal metabolism of  creatinine, excessive creatinine ingestion, or following  therapy that affects renal tubular secretion.        GFR    Date Value Ref Range Status   10/16/2022 >60  Final   10/14/2022 59  Final   10/13/2022 >60  Final     Magnesium   Date Value Ref Range Status   12/30/2022 2.0 1.6 - 2.6 mg/dL Final   10/14/2022 2.1 1.6 - 2.6 mg/dL Final   10/08/2022 1.9 1.6 - 2.6 mg/dL Final     Phosphorus   Date Value Ref Range Status   01/19/2023 4.9 (H) 2.5 - 4.5 mg/dL Final   01/18/2023 5.6 (H) 2.5 - 4.5 mg/dL Final   10/08/2022 3.0 2.5 - 4.5 mg/dL Final     Recent Labs     01/19/23  0509   PH 7.355   PO2 74.8*   PCO2 64.0*   HCO3 34.9*   BE 8.1*   O2SAT 94.2         RADIOLOGY:  CT ABDOMEN PELVIS WO CONTRAST Additional Contrast? None   Final Result   No nephroureterolithiasis or hydronephrosis. Splenomegaly. Small volume of perihepatic ascites. At least moderate-sized partially imaged right pleural effusion with   bibasilar atelectasis. Several locules of gas which appear to be within the endometrium at the level   of the uterine fundus, of uncertain etiology or clinical significance. Please correlate clinically. Diffuse anasarca throughout the soft tissues. Cardiomegaly. XR CHEST PORTABLE   Final Result   Stable right pleural effusion.                  CRITICAL CARE TIME:  40 minutes    Electronically signed by Silvestre Cortez MD on 1/19/2023 at 1:07 PM

## 2023-01-19 NOTE — CARE COORDINATION
1/19/23 ICU, Diltiazem gtt, 3lnc, PT/OT pending notes. Pt from [address] [place] [address],[address]. She is either in bed or in wc most of the time. If return skilled would need a precert per Elisa, however, plans for LTC return- NO PRECERT, needs signed [place] at discharge an rapid covid at discharge. Pt's insurance requires [facility] transport be arranged through [address] [place] [address] at discharge [address] [place] [address]: [phone]. Cm to follow.  Electronically signed by Micky Sterling RN-BC on 1/19/2023 at 12:56 PM

## 2023-01-19 NOTE — PROGRESS NOTES
Admitting Date and Time: 1/17/2023  5:16 PM  Admit Dx: NSTEMI (non-ST elevated myocardial infarction) ([facility] [address] [I21.4]  Atrial fibrillation with rapid ventricular response (HCC) [I48.91]  Recurrent right pleural effusion [J90]  Hypotension, unspecified hypotension type [I95.9]  Acute on chronic respiratory failure with hypoxia and hypercapnia (HCC) [J96.21, J96.22]    Subjective:    No complaints no questions at all  Per RN: afmerryr    ROS: denies fever, chills, cp, sob, n/v, HA unless stated above.      dilTIAZem  60 mg Oral 3 times per day    sucralfate  1 g Oral 4x Daily    rOPINIRole  1 mg Oral TID    atorvastatin  20 mg Oral Nightly    ipratropium-albuterol  1 vial Inhalation 4x Daily    insulin glargine  13 Units SubCUTAneous BID    LORazepam  0.5 mg Oral Nightly    [Held by provider] bumetanide  2 mg Oral Daily    metoprolol succinate  25 mg Oral BID    insulin lispro  0-4 Units SubCUTAneous TID WC    insulin lispro  0-4 Units SubCUTAneous Nightly    budesonide  0.5 mg Nebulization BID    doxycycline (VIBRAMYCIN) IV  100 mg IntraVENous Q12H    cefTRIAXone (ROCEPHIN) IV  1,000 mg IntraVENous Q24H    sodium chloride flush  10 mL IntraVENous 2 times per day    miconazole   Topical BID    arformoterol tartrate  15 mcg Nebulization BID    pantoprazole  40 mg Oral QAM AC    apixaban  5 mg Oral BID    mirtazapine  7.5 mg Oral Nightly    aspirin  81 mg Oral Daily    carbidopa-levodopa  1 tablet Oral TID    sertraline  50 mg Oral Daily    montelukast  10 mg Oral Nightly    [Held by provider] midodrine  5 mg Oral TID WC     loperamide, 2 mg, 4x Daily PRN  sodium chloride flush, 10 mL, PRN  sodium chloride, , PRN  promethazine, 12.5 mg, Q6H PRN   Or  ondansetron, 4 mg, Q6H PRN  polyethylene glycol, 17 g, Daily PRN  acetaminophen, 650 mg, Q6H PRN   Or  acetaminophen, 650 mg, Q6H PRN  glucose, 4 tablet, PRN  dextrose bolus, 125 mL, PRN   Or  dextrose bolus, 250 mL, PRN  glucagon (rDNA), 1 mg, PRN  dextrose, , Continuous PRN Objective:    BP (!) 141/88   Pulse 85   Temp 97.7 °F (36.5 °C) (Bladder)   Resp 20   Ht 5' (1.524 m)   Wt 259 lb 3 oz (117.6 kg)   SpO2 95%   BMI 50.62 kg/m²   General Appearance: alert and oriented to person, place and time and in no acute distress  Skin: warm and dry  Head: normocephalic and atraumatic  Eyes: pupils equal, round, and reactive to light, extraocular eye movements intact, conjunctivae normal  Neck: neck supple and non tender without mass   Pulmonary/Chest: clear to auscultation bilaterally- no wheezes, rales or rhonchi, normal air movement, no respiratory distress  Cardiovascular: normal rate, normal S1 and S2 and no carotid bruits  Abdomen: soft, non-tender, non-distended, normal bowel sounds, no masses or organomegaly  Extremities: no cyanosis, no clubbing and no  edema  Neurologic: no cranial nerve deficit and speech normal      Recent Labs     01/17/23  1723 01/18/23  0415 01/19/23  0427    141 141   K 4.8 5.2* 4.9   CL 94* 97* 97*   CO2 37* 39* 33*   BUN 44* 47* 54*   CREATININE 2.1* 2.2* 2.0*   GLUCOSE 94 147* 235*   CALCIUM 9.3 8.3* 8.5*         Recent Labs     01/17/23  1723   ALKPHOS 98   PROT 6.1*   LABALBU 3.6   BILITOT 0.5   AST 9   ALT <5         Recent Labs     01/17/23  1723 01/18/23  0415 01/19/23  0427   WBC 11.0 9.9 10.6   RBC 3.28* 2.84* 2.91*   HGB 8.1* 7.3* 7.7*   HCT 31.1* 26.5* 26.5*   MCV 94.8 93.3 91.1   MCH 24.7* 25.7* 26.5   MCHC 26.0* 27.5* 29.1*   RDW 16.2* 16.0* 16.3*    174 210   MPV 10.4 10.3 10.4             Radiology:   CT ABDOMEN PELVIS WO CONTRAST Additional Contrast? None   Final Result   No nephroureterolithiasis or hydronephrosis. Splenomegaly. Small volume of perihepatic ascites. At least moderate-sized partially imaged right pleural effusion with   bibasilar atelectasis.       Several locules of gas which appear to be within the endometrium at the level   of the uterine fundus, of uncertain etiology or clinical significance. Please correlate clinically. Diffuse anasarca throughout the soft tissues. Cardiomegaly. XR CHEST PORTABLE   Final Result   Stable right pleural effusion. Assessment:  Principal Problem:    Acute on chronic respiratory failure with hypoxia and hypercapnia (HCC)  Resolved Problems:    * No resolved hospital problems. *      Plan: History of present illness from History and Physical:  This is a 67 y.o. female  has a past medical history of A-fib ([facility] [address] Acute on chronic congestive heart failure ([facility] [address] Anxiety, Asthma, CAD (coronary artery disease), Cancer ([address] Chronic kidney disease, COPD exacerbation ([facility] [address] Depression, Diabetes mellitus ([facility] [address] H/O mammogram, Hx MRSA infection, Hyperlipidemia, Hypertension, Lateral epicondylitis, Morbid obesity ([facility] [address] SERENA on CPAP, Oxygen dependent, Parkinson's disease ([address] and Tubal ligation status. presented with SOB, hypotension  for last few days prior to arrival to the hospital. SOB is associated with some cough, nonproductive but no fever or chills reported. Initially SOB was mild, intermittent but gradually getting more persistent. Started gradually. Exacerbated by general activity or exertion. Relieved only partially by rest. In ED patient was found to be hypotensive with Afib RVR and respiratory failure and was placed on BIPAP. BP improved after midodrine dose. The patient was seen and examined at bedside, appears alert and awake with no acute distress and is able to answer simple  questions.  On direct questioning, patient denied any  resting ongoing chest pain, hemoptysis, productive cough, fever, ongoing palpitation, active abdominal pain, hematemesis, rectal bleeding, blessing, hematuria, any other  and GI complaints, and any new focal neuro deficits     Acute hypercapnic respiratory failure contributed to by: underlying copd (not in exacerbation)   SERENA noncompliant with CPAP & Obesity induced hypoventilation syndrome  No pna on image; bipap hs. O2 today, abg improved  o2, nebs    P A fib eliquis no other dvt prophy needed, rate control  Then RVR in pm of 1/18 so ccb gtts 1/19 switched to po  Uti: Ceftriaxone empiric abx: until Cx resulted. So far gram (+) cocci  Mandeep: inc ivf  Dm: diabetic diet, ssc  Parkinson l dopa  Full code  Dispo: pt, consier place    NOTE: This report was transcribed using voice recognition software. Every effort was made to ensure accuracy; however, inadvertent computerized transcription errors may be present.      Electronically signed by Butch Nick MD on 1/19/2023 at 6:09 PM

## 2023-01-20 ENCOUNTER — HOSPITAL ENCOUNTER (OUTPATIENT)
Dept: OTHER | Age: 73
Discharge: HOME | End: 2023-01-20

## 2023-01-20 LAB
ANION GAP SERPL CALCULATED.3IONS-SCNC: 9 MMOL/L (ref 7–16)
ANISOCYTOSIS: ABNORMAL
BASOPHILS ABSOLUTE: 0 E9/L (ref 0–0.2)
BASOPHILS RELATIVE PERCENT: 0.2 % (ref 0–2)
BUN BLDV-MCNC: 55 MG/DL (ref 6–23)
CALCIUM SERPL-MCNC: 8.3 MG/DL (ref 8.6–10.2)
CHLORIDE BLD-SCNC: 99 MMOL/L (ref 98–107)
CO2: 33 MMOL/L (ref 22–29)
CREAT SERPL-MCNC: 2.1 MG/DL (ref 0.5–1)
EOSINOPHILS ABSOLUTE: 0.08 E9/L (ref 0.05–0.5)
EOSINOPHILS RELATIVE PERCENT: 0.9 % (ref 0–6)
GFR SERPL CREATININE-BSD FRML MDRD: 24 ML/MIN/1.73
GLUCOSE BLD-MCNC: 170 MG/DL (ref 74–99)
HCT VFR BLD CALC: 30.1 % (ref 34–48)
HEMOGLOBIN: 8 G/DL (ref 11.5–15.5)
HYPOCHROMIA: ABNORMAL
LYMPHOCYTES ABSOLUTE: 1.87 E9/L (ref 1.5–4)
LYMPHOCYTES RELATIVE PERCENT: 20.9 % (ref 20–42)
MCH RBC QN AUTO: 25.2 PG (ref 26–35)
MCHC RBC AUTO-ENTMCNC: 26.6 % (ref 32–34.5)
MCV RBC AUTO: 95 FL (ref 80–99.9)
METER GLUCOSE: 180 MG/DL (ref 74–99)
METER GLUCOSE: 204 MG/DL (ref 74–99)
METER GLUCOSE: 230 MG/DL (ref 74–99)
METER GLUCOSE: 234 MG/DL (ref 74–99)
MONOCYTES ABSOLUTE: 0.36 E9/L (ref 0.1–0.95)
MONOCYTES RELATIVE PERCENT: 4.3 % (ref 2–12)
MYELOCYTE PERCENT: 1.7 % (ref 0–0)
NEUTROPHILS ABSOLUTE: 6.59 E9/L (ref 1.8–7.3)
NEUTROPHILS RELATIVE PERCENT: 72.2 % (ref 43–80)
ORGANISM: ABNORMAL
ORGANISM: ABNORMAL
OVALOCYTES: ABNORMAL
PDW BLD-RTO: 16.4 FL (ref 11.5–15)
PHOSPHORUS: 4.7 MG/DL (ref 2.5–4.5)
PLATELET # BLD: 231 E9/L (ref 130–450)
PMV BLD AUTO: 10.5 FL (ref 7–12)
POIKILOCYTES: ABNORMAL
POLYCHROMASIA: ABNORMAL
POTASSIUM REFLEX MAGNESIUM: 4.8 MMOL/L (ref 3.5–5)
RBC # BLD: 3.17 E12/L (ref 3.5–5.5)
SODIUM BLD-SCNC: 141 MMOL/L (ref 132–146)
URINE CULTURE, ROUTINE: ABNORMAL
WBC # BLD: 8.9 E9/L (ref 4.5–11.5)
WOUND/ABSCESS: ABNORMAL

## 2023-01-20 PROCEDURE — 2580000003 HC RX 258: Performed by: INTERNAL MEDICINE

## 2023-01-20 PROCEDURE — 2580000003 HC RX 258: Performed by: NURSE PRACTITIONER

## 2023-01-20 PROCEDURE — 84100 ASSAY OF PHOSPHORUS: CPT

## 2023-01-20 PROCEDURE — 2500000003 HC RX 250 WO HCPCS: Performed by: INTERNAL MEDICINE

## 2023-01-20 PROCEDURE — 85025 COMPLETE CBC W/AUTO DIFF WBC: CPT

## 2023-01-20 PROCEDURE — 80048 BASIC METABOLIC PNL TOTAL CA: CPT

## 2023-01-20 PROCEDURE — 36415 COLL VENOUS BLD VENIPUNCTURE: CPT

## 2023-01-20 PROCEDURE — 6360000002 HC RX W HCPCS: Performed by: NURSE PRACTITIONER

## 2023-01-20 PROCEDURE — 97110 THERAPEUTIC EXERCISES: CPT

## 2023-01-20 PROCEDURE — 2060000000 HC ICU INTERMEDIATE R&B

## 2023-01-20 PROCEDURE — 2700000000 HC OXYGEN THERAPY PER DAY

## 2023-01-20 PROCEDURE — 94640 AIRWAY INHALATION TREATMENT: CPT

## 2023-01-20 PROCEDURE — 97530 THERAPEUTIC ACTIVITIES: CPT | Performed by: OCCUPATIONAL THERAPIST

## 2023-01-20 PROCEDURE — 97530 THERAPEUTIC ACTIVITIES: CPT

## 2023-01-20 PROCEDURE — 6370000000 HC RX 637 (ALT 250 FOR IP): Performed by: INTERNAL MEDICINE

## 2023-01-20 PROCEDURE — 97165 OT EVAL LOW COMPLEX 30 MIN: CPT | Performed by: OCCUPATIONAL THERAPIST

## 2023-01-20 PROCEDURE — 82962 GLUCOSE BLOOD TEST: CPT

## 2023-01-20 PROCEDURE — 6370000000 HC RX 637 (ALT 250 FOR IP): Performed by: NURSE PRACTITIONER

## 2023-01-20 PROCEDURE — 99232 SBSQ HOSP IP/OBS MODERATE 35: CPT | Performed by: INTERNAL MEDICINE

## 2023-01-20 PROCEDURE — 94660 CPAP INITIATION&MGMT: CPT

## 2023-01-20 RX ORDER — LINEZOLID 600 MG/1
600 TABLET, FILM COATED ORAL EVERY 12 HOURS SCHEDULED
Status: COMPLETED | OUTPATIENT
Start: 2023-01-20 | End: 2023-01-26

## 2023-01-20 RX ADMIN — SODIUM CHLORIDE: 9 INJECTION, SOLUTION INTRAVENOUS at 02:55

## 2023-01-20 RX ADMIN — INSULIN LISPRO 1 UNITS: 100 INJECTION, SOLUTION INTRAVENOUS; SUBCUTANEOUS at 11:49

## 2023-01-20 RX ADMIN — SERTRALINE 50 MG: 50 TABLET, FILM COATED ORAL at 08:17

## 2023-01-20 RX ADMIN — ASPIRIN 81 MG: 81 TABLET, COATED ORAL at 08:16

## 2023-01-20 RX ADMIN — PANTOPRAZOLE SODIUM 40 MG: 40 TABLET, DELAYED RELEASE ORAL at 06:15

## 2023-01-20 RX ADMIN — INSULIN GLARGINE 13 UNITS: 100 INJECTION, SOLUTION SUBCUTANEOUS at 08:21

## 2023-01-20 RX ADMIN — DOXYCYCLINE 100 MG: 100 INJECTION, POWDER, LYOPHILIZED, FOR SOLUTION INTRAVENOUS at 06:16

## 2023-01-20 RX ADMIN — CEFTRIAXONE 1000 MG: 1 INJECTION, POWDER, FOR SOLUTION INTRAMUSCULAR; INTRAVENOUS at 23:22

## 2023-01-20 RX ADMIN — ARFORMOTEROL TARTRATE 15 MCG: 15 SOLUTION RESPIRATORY (INHALATION) at 16:00

## 2023-01-20 RX ADMIN — APIXABAN 5 MG: 5 TABLET, FILM COATED ORAL at 23:16

## 2023-01-20 RX ADMIN — ROPINIROLE HYDROCHLORIDE 1 MG: 1 TABLET, FILM COATED ORAL at 14:44

## 2023-01-20 RX ADMIN — IPRATROPIUM BROMIDE AND ALBUTEROL SULFATE 3 ML: .5; 2.5 SOLUTION RESPIRATORY (INHALATION) at 12:51

## 2023-01-20 RX ADMIN — BUDESONIDE 500 MCG: 0.5 SUSPENSION RESPIRATORY (INHALATION) at 05:28

## 2023-01-20 RX ADMIN — ROPINIROLE HYDROCHLORIDE 1 MG: 1 TABLET, FILM COATED ORAL at 23:16

## 2023-01-20 RX ADMIN — SODIUM CHLORIDE: 9 INJECTION, SOLUTION INTRAVENOUS at 11:45

## 2023-01-20 RX ADMIN — SUCRALFATE 1 G: 1 TABLET ORAL at 08:16

## 2023-01-20 RX ADMIN — LINEZOLID 600 MG: 600 TABLET, FILM COATED ORAL at 23:22

## 2023-01-20 RX ADMIN — ROPINIROLE HYDROCHLORIDE 1 MG: 1 TABLET, FILM COATED ORAL at 08:16

## 2023-01-20 RX ADMIN — ATORVASTATIN CALCIUM 20 MG: 20 TABLET, FILM COATED ORAL at 23:17

## 2023-01-20 RX ADMIN — IPRATROPIUM BROMIDE AND ALBUTEROL SULFATE 3 ML: .5; 2.5 SOLUTION RESPIRATORY (INHALATION) at 05:29

## 2023-01-20 RX ADMIN — LINEZOLID 600 MG: 600 TABLET, FILM COATED ORAL at 09:27

## 2023-01-20 RX ADMIN — INSULIN LISPRO 1 UNITS: 100 INJECTION, SOLUTION INTRAVENOUS; SUBCUTANEOUS at 16:46

## 2023-01-20 RX ADMIN — ANTI-FUNGAL POWDER MICONAZOLE NITRATE TALC FREE: 1.42 POWDER TOPICAL at 23:20

## 2023-01-20 RX ADMIN — BUDESONIDE 500 MCG: 0.5 SUSPENSION RESPIRATORY (INHALATION) at 16:00

## 2023-01-20 RX ADMIN — Medication 10 ML: at 08:20

## 2023-01-20 RX ADMIN — SODIUM CHLORIDE: 9 INJECTION, SOLUTION INTRAVENOUS at 21:16

## 2023-01-20 RX ADMIN — METOPROLOL SUCCINATE 25 MG: 25 TABLET, FILM COATED, EXTENDED RELEASE ORAL at 23:17

## 2023-01-20 RX ADMIN — LORAZEPAM 0.5 MG: 0.5 TABLET ORAL at 23:17

## 2023-01-20 RX ADMIN — CARBIDOPA AND LEVODOPA 1 TABLET: 50; 200 TABLET, EXTENDED RELEASE ORAL at 08:18

## 2023-01-20 RX ADMIN — IPRATROPIUM BROMIDE AND ALBUTEROL SULFATE 3 ML: .5; 2.5 SOLUTION RESPIRATORY (INHALATION) at 08:28

## 2023-01-20 RX ADMIN — SUCRALFATE 1 G: 1 TABLET ORAL at 16:46

## 2023-01-20 RX ADMIN — ANTI-FUNGAL POWDER MICONAZOLE NITRATE TALC FREE: 1.42 POWDER TOPICAL at 08:21

## 2023-01-20 RX ADMIN — CARBIDOPA AND LEVODOPA 1 TABLET: 50; 200 TABLET, EXTENDED RELEASE ORAL at 23:17

## 2023-01-20 RX ADMIN — Medication 10 ML: at 23:20

## 2023-01-20 RX ADMIN — SUCRALFATE 1 G: 1 TABLET ORAL at 11:43

## 2023-01-20 RX ADMIN — CARBIDOPA AND LEVODOPA 1 TABLET: 50; 200 TABLET, EXTENDED RELEASE ORAL at 14:44

## 2023-01-20 RX ADMIN — INSULIN GLARGINE 13 UNITS: 100 INJECTION, SOLUTION SUBCUTANEOUS at 23:18

## 2023-01-20 RX ADMIN — IPRATROPIUM BROMIDE AND ALBUTEROL SULFATE 3 ML: .5; 2.5 SOLUTION RESPIRATORY (INHALATION) at 16:00

## 2023-01-20 RX ADMIN — METOPROLOL SUCCINATE 25 MG: 25 TABLET, FILM COATED, EXTENDED RELEASE ORAL at 08:17

## 2023-01-20 RX ADMIN — APIXABAN 5 MG: 5 TABLET, FILM COATED ORAL at 08:16

## 2023-01-20 RX ADMIN — MIRTAZAPINE 7.5 MG: 15 TABLET, FILM COATED ORAL at 23:16

## 2023-01-20 RX ADMIN — SUCRALFATE 1 G: 1 TABLET ORAL at 23:16

## 2023-01-20 RX ADMIN — DOXYCYCLINE 100 MG: 100 INJECTION, POWDER, LYOPHILIZED, FOR SOLUTION INTRAVENOUS at 16:50

## 2023-01-20 RX ADMIN — ARFORMOTEROL TARTRATE 15 MCG: 15 SOLUTION RESPIRATORY (INHALATION) at 05:28

## 2023-01-20 RX ADMIN — MONTELUKAST SODIUM 10 MG: 10 TABLET, FILM COATED ORAL at 23:16

## 2023-01-20 RX ADMIN — DILTIAZEM HYDROCHLORIDE 60 MG: 30 TABLET, FILM COATED ORAL at 06:16

## 2023-01-20 ASSESSMENT — PAIN SCALES - GENERAL
PAINLEVEL_OUTOF10: 0

## 2023-01-20 NOTE — PROGRESS NOTES
[facility] MD Linden Amato MD Isobel Rein, MD Lynford Hodgkins, BRADLEY Devlin, NUNU Amaral, BRADLEY  Progress Note    1/20/2023    SUBJECTIVE:   1/20: Feeling little better today. Denies new complaint. Still tachypnea, though denies dyspnea no cp/palp Appetite ok ROS otherwise unremarkable    PROBLEM LIST:    Principal Problem:    Acute on chronic respiratory failure with hypoxia and hypercapnia (HCC)  Resolved Problems:    * No resolved hospital problems. *         DIET:    ADULT DIET; Regular; 3 carb choices (45 gm/meal); Low Fat/Low Chol/High Fiber/2 gm Na;  Moderately Thick (Honey); 1800 ml     MEDS (scheduled):    linezolid  600 mg Oral 2 times per day    dilTIAZem  60 mg Oral 3 times per day    sucralfate  1 g Oral 4x Daily    rOPINIRole  1 mg Oral TID    atorvastatin  20 mg Oral Nightly    ipratropium-albuterol  1 vial Inhalation 4x Daily    insulin glargine  13 Units SubCUTAneous BID    LORazepam  0.5 mg Oral Nightly    [Held by provider] bumetanide  2 mg Oral Daily    metoprolol succinate  25 mg Oral BID    insulin lispro  0-4 Units SubCUTAneous TID WC    insulin lispro  0-4 Units SubCUTAneous Nightly    budesonide  0.5 mg Nebulization BID    doxycycline (VIBRAMYCIN) IV  100 mg IntraVENous Q12H    cefTRIAXone (ROCEPHIN) IV  1,000 mg IntraVENous Q24H    sodium chloride flush  10 mL IntraVENous 2 times per day    miconazole   Topical BID    arformoterol tartrate  15 mcg Nebulization BID    pantoprazole  40 mg Oral QAM AC    apixaban  5 mg Oral BID    mirtazapine  7.5 mg Oral Nightly    aspirin  81 mg Oral Daily    carbidopa-levodopa  1 tablet Oral TID    sertraline  50 mg Oral Daily    montelukast  10 mg Oral Nightly    [Held by provider] midodrine  5 mg Oral TID WC       MEDS (infusions):   sodium chloride      sodium chloride 125 mL/hr at 01/20/23 0554    dextrose         MEDS (prn):  loperamide, sodium chloride flush, sodium chloride, promethazine **OR** ondansetron, polyethylene glycol, acetaminophen **OR** acetaminophen, glucose, dextrose bolus **OR** dextrose bolus, glucagon (rDNA), dextrose    PHYSICAL EXAM:     Patient Vitals for the past 24 hrs:   BP Temp Temp src Pulse Resp SpO2 Weight   01/20/23 0900 (!) 144/72 -- -- 73 -- (!) 89 % --   01/20/23 0817 122/76 -- -- 75 -- -- --   01/20/23 0800 122/76 96.8 °F (36 °C) Bladder 74 18 92 % --   01/20/23 0700 (!) 143/76 -- -- 83 -- 90 % --   01/20/23 0600 (!) 146/80 -- -- 72 -- (!) 89 % 273 lb 5 oz (124 kg)   01/20/23 0500 (!) 117/59 -- -- 67 -- 98 % --   01/20/23 0400 (!) 62/47 97 °F (36.1 °C) Bladder 83 -- 93 % --   01/20/23 0300 110/73 -- -- 62 -- 95 % --   01/20/23 0200 (!) 111/59 -- -- 66 -- 94 % --   01/20/23 0118 -- -- -- -- 24 -- --   01/20/23 0100 (!) 111/58 -- -- 79 -- 92 % --   01/20/23 0000 (!) 104/58 97.2 °F (36.2 °C) Bladder 85 -- 95 % --   01/19/23 2300 121/65 -- -- 96 -- 96 % --   01/19/23 2214 -- -- -- -- 23 -- --   01/19/23 2200 131/85 -- -- 87 -- 92 % --   01/19/23 2121 -- -- -- (!) 112 -- -- --   01/19/23 2100 (!) 149/89 -- -- 83 -- 91 % --   01/19/23 2000 (!) 160/98 97.7 °F (36.5 °C) Bladder 93 18 (!) 88 % --   01/19/23 1800 (!) 141/88 -- -- 85 20 95 % --   01/19/23 1700 131/63 -- -- 81 20 95 % --   01/19/23 1600 136/75 97.7 °F (36.5 °C) Bladder 91 20 93 % --   01/19/23 1500 (!) 142/64 -- -- 94 18 91 % --   01/19/23 1400 (!) 154/80 -- -- 91 18 95 % --   01/19/23 1300 (!) 150/86 -- -- (!) 105 20 94 % --   01/19/23 1200 (!) 158/90 98 °F (36.7 °C) Bladder (!) 101 22 94 % --   01/19/23 1100 (!) 162/96 -- -- 93 18 93 % --   01/19/23 1000 (!) 125/91 -- -- 95 20 (!) 89 % --   @      Intake/Output Summary (Last 24 hours) at 1/20/2023 0948  Last data filed at 1/20/2023 0800  Gross per 24 hour   Intake 3518.21 ml   Output 500 ml   Net 3018.21 ml         Wt Readings from Last 3 Encounters:   01/20/23 273 lb 5 oz (124 kg)   01/16/23 259 lb (117.5 kg)   01/16/23 259 lb (117.5 kg)     Age-appropriate morbidly obese white woman, though easily arousable, no apparent distress  NC/AT EOMI sclera conjunctive are clear and anicteric mucous membranes are moist  Neck soft supple trachea midline  Coarse breath sounds with crackles on the right, mildly prolonged expiratory phase but no wheeze. Arguably a little more clear than yesterday  Regular rhythm normal S1 and S2  Abdomen soft nontender nondistended normal active bowel sounds. Abdominal pannus has substantial edema  Distal extremities, thighs, sacrum have substantial chronic type nonpitting edema, though no pitting edema  Pulses 1+ x4  Moves all 4 extremities  Cranial nerves II through XII grossly intact  Awake, alert, interactive and appropriate  Skin tone consistent with chronic venous stasis distally      DATA:    Recent Labs     01/18/23  0415 01/19/23  0427 01/20/23  0507   WBC 9.9 10.6 8.9   HGB 7.3* 7.7* 8.0*   HCT 26.5* 26.5* 30.1*   MCV 93.3 91.1 95.0    210 231     Recent Labs     01/17/23  1723 01/18/23  0415 01/19/23  0427 01/20/23  0507    141 141 141   K 4.8 5.2* 4.9 4.8   CL 94* 97* 97* 99   CO2 37* 39* 33* 33*   PHOS  --  5.6* 4.9* 4.7*   BUN 44* 47* 54* 55*   CREATININE 2.1* 2.2* 2.0* 2.1*   ALT <5  --   --   --    AST 9  --   --   --    BILIDIR 0.3  --   --   --    BILITOT 0.5  --   --   --    ALKPHOS 98  --   --   --        Lab Results   Component Value Date    LABPROT 0.5 (H) 01/18/2023    LABPROT 0.5 01/18/2023     On CT no hydro/signs of obstruction     Urine studies  U Na 21, U Cl 23 U prot:cr ratio 0.5    ASSESSMENT / RECOMMENDATIONS:       Assessment  1. Acute kidney injury, secondary to prerenal azotemia. Creatinine and UO are improving with IV fluid resuscitation     2. CKD stage III, Baseline creatinine 1.4 to 1.7 mg/dL, secondary to diabetic nephropathy and renal microvascular atherosclerotic disease     3. Anemia due to CKD, phlebotomy associated prolonged hospitalization     4.   Acute hypercapnic and hypoxic respiratory failure due to COPD exacerbation and pneumonia. Does not appear hypervolemic. 5.  Morbid obesity, BMI 50.6 kg per metered square     6.   Edema/anasarca, chronic, multifactorial, due to venous insufficiency in the setting of morbid obesity, relative immobility, likely diastolic dysfunction though it was \"indeterminate\" on echo, the does have pulmonary hypertension, mild right-sided heart failure     Recommendations  Continue IV fluid resuscitation  Continue intensive supportive care  Follow labs, UO      Electronically signed by Elias Sullivan MD on 1/20/2023

## 2023-01-20 NOTE — PROGRESS NOTES
Occupational Therapy  OCCUPATIONAL THERAPY INITIAL EVALUATION     [facility]  [facility]         Date:2023                                                   Patient Name: Tamiko Hendrix     MRN: [id number]     : 1950     Room: [place] [address]       Evaluating OT: Eyvonne Libman, OTR/L; [id number]       Referring Provider and Orders/Date:    OT eval and treat  Start:  23,   End:  23,   ONE TIME,   Standing Count:  1 Occurrences,   Kenisha Daley MD        Diagnosis:   1. Atrial fibrillation with rapid ventricular response ([address]    2. Acute on chronic respiratory failure with hypoxia and hypercapnia (HCC)    3. Recurrent right pleural effusion    4. NSTEMI (non-ST elevated myocardial infarction) ([address]    5.  Hypotension, unspecified hypotension type         Surgery: none      Pertinent Medical History:        Past Medical History:   Diagnosis Date    A-fib ([address]     Acute on chronic congestive heart failure (HCC)     Anxiety     Asthma     CAD (coronary artery disease) 2016    Cancer ([address]  breast ca 2006    right lumpectomy    Chronic kidney disease     nephrolithiasis    COPD exacerbation ([address] 10/12/2022    Depression     Diabetes mellitus ([address]     H/O mammogram     Hx MRSA infection     toe infection 2012    Hyperlipidemia     Hypertension     Lateral epicondylitis     Morbid obesity (HCC)     SERENA on CPAP     Oxygen dependent     Parkinson's disease ([address]     Tubal ligation status           Past Surgical History:   Procedure Laterality Date    BREAST LUMPECTOMY      BREAST REDUCTION SURGERY      CARDIAC CATHETERIZATION  2014    Dr. Hesham Holly  2022    Dr. Quang Reed  7/29/15    CT GUIDED CHEST TUBE  2022    CT GUIDED CHEST TUBE 2022 Mary Lira MD [facility]    ENDOSCOPY, COLON, DIAGNOSTIC  7/19/15    GALLBLADDER SURGERY      LUMBAR LAMINECTOMY      TOE AMPUTATION      TONSILLECTOMY      UPPER GASTROINTESTINAL ENDOSCOPY      UPPER GASTROINTESTINAL ENDOSCOPY N/A 7/19/2022    EGD ESOPHAGOGASTRODUODENOSCOPY performed by Mag Mabry MD at [facility] ENDOSCOPY       Precautions:  Fall Risk, continuous bipap, genao    Recommended placement: subacute    Assessment of current deficits     [x] Functional mobility  [x]ADLs  [x] Strength               []Cognition     [x] Functional transfers   [x] IADLs         [x] Safety Awareness   [x]Endurance     [] Fine Coordination              [x] Balance      [] Vision/perception   []Sensation      [x]Gross Motor Coordination  [] ROM  [] Delirium                   [] Motor Control     OT PLAN OF CARE   OT POC based on physician orders, patient diagnosis and results of clinical assessment    Frequency/Duration 1-3 days/wk for 2 weeks PRN   Specific OT Treatment Interventions to include:   * Instruction/training on adapted ADL techniques and AE recommendations to increase functional independence within precautions       * Training on energy conservation strategies, correct breathing pattern and techniques to improve independence/tolerance for self-care routine  * Functional transfer/mobility training/DME recommendations for increased independence, safety, and fall prevention  * Patient/Family education to increase follow through with safety techniques and functional independence  * Recommendation of environmental modifications for increased safety with functional transfers/mobility and ADLs  * Therapeutic exercise to improve motor endurance, ROM, and functional strength for ADLs/functional transfers  * Therapeutic activities to facilitate/challenge dynamic balance, stand tolerance for increased safety and independence with ADLs  * Therapeutic activities to facilitate gross/fine motor skills for increased independence with ADLs  * Neuro-muscular re-education: facilitation of righting/equilibrium reactions, midline orientation, scapular stability/mobility, normalization of muscle tone, and facilitation of volitional active controled movement  * Positioning to improve skin integrity, interaction with environment and functional independence     Recommended Adaptive Equipment/DME:  TBD      Home Living: Pt is a long term resident of a SNF and plans to return at [facility]. Bathroom setup: roll in shower chair; grab bars by the toilet and 3:1 over top for safety. DME owned: wc and walker      Prior Level of Function: assist with ADLs , dep with IADLs; ambulated with wc mostly, but transferring with ww and assist   Driving: no   Occupation: none   Enjoys: reading, socializing, phone-games    Pain Level: none  Cognition: A&O: 2/4 not to time or situation; Follows 2 step directions-very fatigued and limited with O2 and bipap   Memory:  Intact   Sequencing:  fair   Problem solving:  fair-   Judgement/safety:  fair-  AM-[facility] Daily Activity Inpatient   How much help for putting on and taking off regular lower body clothing?: Total  How much help for Bathing?: A Lot  How much help for Toileting?: Total  How much help for putting on and taking off regular upper body clothing?: Total  How much help for taking care of personal grooming?: A Lot  How much help for eating meals?: A Little  [facility] Inpatient Daily Activity Raw Score: 10  AM-[facility] Inpatient ADL T-Scale Score : 27.31  ADL Inpatient CMS 0-100% Score: 74.7  ADL Inpatient CMS G-Code Modifier : CL    Functional Assessment:     Initial Eval Status  Date: 1/20/2023   Treatment Status  Date: STGs = LTGs  Time frame: 10-14 days   Feeding Minimal Assist with modified diet, O2 limitations and fatigue. Set up   Grooming Moderate Assist with limitations due to bipap.    Stand by Assist    UB Dressing Maximal Assist with gown management from supine  Moderate Assist    LB Dressing Dependent with socks and heel protectors from supine  Maximal Assist    Bathing Maximal Assist from supine level  Moderate Assist    Toileting Dependent with genao management  Maximal Assist    Bed Mobility  Pt had just returned to supine after sitting EOB with PT-reported max A with dropping vitals. Body mobility and positioning with max A for comfort and upright posture  Supine to sit: Minimal Assist   Sit to supine: Minimal Assist    Functional Transfers NT due to pt overall debility, decreased activity tolerance, balance deficits, safety and fall risk. Dropping O2 with supine tasks  Moderate Assist    Functional Mobility Not appropriate at time of eval. To re-assess at a later time if appropriate. Not appropriate at time of eval. To re-assess at a later time if appropriate. Balance Sitting:     Static:  NT    Dynamic:NT  Standing: NT  Sitting:     Static:  fair    Dynamic:fair-  Standing: poor+   Activity Tolerance Vitals with activity: cont bipap 88-90%; 151/84 HR 67; 129/70 with ADLs, 88% HR 70; poor tolerance to therapy overall with pt very fatigued and falling asleep. Unable to tolerate sitting with OT session. Increase sitting tolerance for >15min with stable vital signs for carry over into toileting, functional tranfers and indep in ADLs   Visual/  Perceptual Glasses: not Present; WFL     Reports change in vision since admission: No     NA     Hand Dominance  [x] Right   [] Left     AROM (PROM) Strength Additional Info:  Goal:   RUE  WFL 4-/5 good  and fair FMC/dexterity noted during ADL tasks-tremor  Opposition [x] Intact [] Impaired  Finger to nose [x] Intact [] Impaired 4+/5MMT generally for carry over into self care, functional transfers and functional mobility with AD. LUE WFL 4-/5 good  and fair FMC/dexterity noted during ADL tasks-tremor  Opposition [x] Intact [] Impaired  Finger to nose [x] Intact [] Impaired 5/5MMT generally for carry over into self care, functional transfers and functional mobility with AD.        Hearing: WFL   Sensation:  No c/o numbness or tingling   Tone: WFL   Edema: right UE and funmi LE    Comments: Upon arrival patient supine in bed with bipap and overall fatigue. Pt required dep A for most UB ADLs and dep A LB ADLs tasks. The biggest barriers reflect that of functional transfers, functional mobility, UB/LB ADLs, cognition, activity tolerance, balance, safety and strengthening. At end of session, patient supine with call light and phone within reach, all lines and tubes intact.  Overall patient demonstrated decreased independence and safety during completion of ADL/functional transfer/mobility tasks compared to PLOF. Nursing updated on pt position and status following OT eval. Pt would benefit from continued skilled OT to increase safety and independence with completion of ADL/IADL tasks for functional independence and quality of life.    Treatment: OT treatment provided this date includes:  Instruction, education and training on safe facilitation and adapted techniques for completion of ADLs. These include neuromuscular reeducation to facilitate balance/righting reactions, proper positioning/alignment to improve interaction with environment and overall function and on adapted techniques/work simplification for completion of ADLs. Education provided on hand/feet placement with bed rails and body mechanics for energy conservation. Cues for energy conservation and safety for in the home at [place], including modifications and DME. Extended time to complete all tasks, including skilled monitoring of patient's response during treatment session and vital signs. Prior to and at the end of session, environmental modifications / line management completed for patients safety and efficiency of treatment session. See above for further details.    Rehab Potential: Good for established goals     Patient / Family Goal: O2 management      Patient and/or family were instructed on functional diagnosis, prognosis/goals and OT plan of care. Demonstrated fair understanding.      Eval Complexity: Low  History: Brief review of medical records and additional review of physical, cognitive, or psychosocial history related to current functional performance  Exam: 3+ performance deficits  Assistance/Modification: Max assistance or modifications required to perform tasks. May have comorbidities that affect occupational performance. Time In: 1023  Time Out: 1051  Total Treatment Time: 8    Min Units   OT Eval Low 97165  x  1   OT Eval Medium 97625      OT Eval High 39635      OT Re-Eval [id number]       Therapeutic Ex 85189       Therapeutic Activities 41476  8 1    ADL/Self Care 48361       Orthotic Management 00637       Manual [id number]     Neuro Re-Ed [id number]       Non-Billable Time          Evaluation Time additionally includes thorough review of current medical information, gathering information on past medical history/social history and prior level of function, interpretation of standardized testing/informal observation of tasks, assessment of data and development of plan of care and goals.             Javier Herrera OTR/L; [id number]

## 2023-01-20 NOTE — PROGRESS NOTES
Pulmonary/Critical Care Consult Note    CHIEF COMPLAINT: Shortness of breath and hypotension      IMPRESSION/ PLAN:    Acute on chronic hypoxic and hypercapnic respiratory failure  COPD   Obesity induced hypoventilation syndrome  SERENA noncompliant with CPAP  On BiPAP overnight   Much improved  States at baseline   ABG normalized  DuoNeb and Brovana    Steady improvement    CHF - Diastolic  Pleural effusions  Very elevated pro-BNP    Hypotension  History of   Improved / resolved    UTI with VRE  Start Linezolid PO  ID consult    Severe pulmonary hypertension  Secondary to above      SHANTANU on CKD  Hyperkalemia  Elevated K+ resolved  Close to baseline  Nephrology consultation appreciated   Follow     Chronic atrial fibrillation with RVR  On metoprolol  Off dilt drip    Rate controlled  On PO dilt  Eliquis    UTI  ON Ceftriaxone  Lactate normal    Type 2 diabetes   Insulin sliding scale      Anemia  Transfuse for Hgb  < 7    Intertrigo  Wound culture to left lower leg  Miconazole powder to rash on lower abdominal folds twice daily      Overall improved     Ready for transfer out of ICU  Discharge planning  PT/OT  __________________________________________    Subjective  Feeling better  States at baseline    Events last 24 hr  AF rate controlled  BP OK      HISTORY OF PRESENT ILLNESS: Patient is a 77-year-old female with history of COPD, CAD, breast cancer, CKD, hypertension, hyperlipidemia, diabetes mellitus, obstructive sleep apnea noncompliant with CPAP, oxygen dependence, and Parkinson's disease. Patient presented to the ED on 1/17/2023 for increased shortness of breath over the past 2 days. Patient placed on BiPAP on presentation to the ED and initial ABG was likely mixed or venous. Repeat ABG on AVAPS showed a pH of 7.257, PCO2 90, PO2 183.4, HCO3 39.2, and SPO2 98.6%.   The patient also received 1 L normal saline bolus and midodrine 10 mg p.o. x1 for hypotension which the patient takes at the nursing home 3 times daily.  The patient did respond well to the IV fluids and does not require vasopressor support at this time. Lactic acid normal at 1.7. According to the patient's family she is supposed to wear CPAP at night but is noncompliant. She uses supplemental oxygen as needed. Portable chest x-ray shows stable right pleural effusion and no other acute cardiopulmonary process.     ALLERGY:  Ciprofloxacin, Codeine, and Digoxin and related            MEDICAL HISTORY:  Past Medical History:   Diagnosis Date    A-fib ([facility]     Acute on chronic congestive heart failure (HCC)     Anxiety     Asthma     CAD (coronary artery disease) 01/21/2016    Cancer ([facility]  breast ca 2006    right lumpectomy    Chronic kidney disease     nephrolithiasis    COPD exacerbation ([facility] 10/12/2022    Depression     Diabetes mellitus ([facility]     H/O mammogram     Hx MRSA infection     toe infection january 2012    Hyperlipidemia     Hypertension     Lateral epicondylitis     Morbid obesity (HCC)     SERENA on CPAP     Oxygen dependent     Parkinson's disease (HCC)     Tubal ligation status        MEDICATIONS:   dilTIAZem  60 mg Oral 3 times per day    sucralfate  1 g Oral 4x Daily    rOPINIRole  1 mg Oral TID    atorvastatin  20 mg Oral Nightly    ipratropium-albuterol  1 vial Inhalation 4x Daily    insulin glargine  13 Units SubCUTAneous BID    LORazepam  0.5 mg Oral Nightly    [Held by provider] bumetanide  2 mg Oral Daily    metoprolol succinate  25 mg Oral BID    insulin lispro  0-4 Units SubCUTAneous TID WC    insulin lispro  0-4 Units SubCUTAneous Nightly    budesonide  0.5 mg Nebulization BID    doxycycline (VIBRAMYCIN) IV  100 mg IntraVENous Q12H    cefTRIAXone (ROCEPHIN) IV  1,000 mg IntraVENous Q24H    sodium chloride flush  10 mL IntraVENous 2 times per day    miconazole   Topical BID    arformoterol tartrate  15 mcg Nebulization BID    pantoprazole  40 mg Oral QAM AC    apixaban  5 mg Oral BID    mirtazapine  7.5 mg Oral Nightly    aspirin  81 mg Oral Daily    carbidopa-levodopa  1 tablet Oral TID    sertraline  50 mg Oral Daily    montelukast  10 mg Oral Nightly    [Held by provider] midodrine  5 mg Oral TID WC      sodium chloride      sodium chloride 125 mL/hr at 01/20/23 0554    dextrose           PHYSICAL EXAM:  Vitals:    01/20/23 0817   BP: 122/76   Pulse: 75   Resp:    Temp:    SpO2:      FiO2 : 40 %  O2 Flow Rate (L/min): 4 L/min  O2 Device: Nasal cannula    Constitutional: No fever, chills, diaphoresis  HEENT: No head lesions, PERRL, EOMI, mouth without lesions, no nasal lesions, no cervical adenopathy palpated   Respiratory: Lungs with equal breath sounds bilaterally diminished with no  wheezes, no accessory muscle use   CV: Rapid rate and irregular rhythm, no murmurs, JVD, bilateral leg edema  Abdomen: Soft, obese, non tender, + bowel sounds, no lesions   Skin: Adequate turgor, no rash, capillary refill <2 seconds, rash noted to bilateral lower abdominal folds, wound noted to left anterior shin  Extremities: Muscular strength 4/4 in 4 limbs, moves 4 limbs spontaneously, distal pulses present   Neurology: Awake and alert, follows commands, moves 4 limbs on command and spontaneously, equal sensation, no dysmetria, neck is supple, no meningitic signs present.       LABS:  WBC   Date Value Ref Range Status   01/20/2023 8.9 4.5 - 11.5 E9/L Final   01/19/2023 10.6 4.5 - 11.5 E9/L Final   01/18/2023 9.9 4.5 - 11.5 E9/L Final     Hemoglobin   Date Value Ref Range Status   01/20/2023 8.0 (L) 11.5 - 15.5 g/dL Final   01/19/2023 7.7 (L) 11.5 - 15.5 g/dL Final   01/18/2023 7.3 (L) 11.5 - 15.5 g/dL Final     Hematocrit   Date Value Ref Range Status   01/20/2023 30.1 (L) 34.0 - 48.0 % Final   01/19/2023 26.5 (L) 34.0 - 48.0 % Final   01/18/2023 26.5 (L) 34.0 - 48.0 % Final     MCV   Date Value Ref Range Status   01/20/2023 95.0 80.0 - 99.9 fL Final   01/19/2023 91.1 80.0 - 99.9 fL Final   01/18/2023 93.3 80.0 - 99.9 fL Final     Platelets   Date Value Ref Range Status   01/20/2023 231 130 - 450 E9/L Final   01/19/2023 210 130 - 450 E9/L Final   01/18/2023 174 130 - 450 E9/L Final     Sodium   Date Value Ref Range Status   01/20/2023 141 132 - 146 mmol/L Final   01/19/2023 141 132 - 146 mmol/L Final   01/18/2023 141 132 - 146 mmol/L Final     Potassium reflex Magnesium   Date Value Ref Range Status   01/20/2023 4.8 3.5 - 5.0 mmol/L Final   01/19/2023 4.9 3.5 - 5.0 mmol/L Final   01/18/2023 5.2 (H) 3.5 - 5.0 mmol/L Final     Chloride   Date Value Ref Range Status   01/20/2023 99 98 - 107 mmol/L Final   01/19/2023 97 (L) 98 - 107 mmol/L Final   01/18/2023 97 (L) 98 - 107 mmol/L Final     CO2   Date Value Ref Range Status   01/20/2023 33 (H) 22 - 29 mmol/L Final   01/19/2023 33 (H) 22 - 29 mmol/L Final   01/18/2023 39 (H) 22 - 29 mmol/L Final     BUN   Date Value Ref Range Status   01/20/2023 55 (H) 6 - 23 mg/dL Final   01/19/2023 54 (H) 6 - 23 mg/dL Final   01/18/2023 47 (H) 6 - 23 mg/dL Final     Creatinine   Date Value Ref Range Status   01/20/2023 2.1 (H) 0.5 - 1.0 mg/dL Final   01/19/2023 2.0 (H) 0.5 - 1.0 mg/dL Final   01/18/2023 2.2 (H) 0.5 - 1.0 mg/dL Final     Glucose   Date Value Ref Range Status   01/20/2023 170 (H) 74 - 99 mg/dL Final   01/19/2023 235 (H) 74 - 99 mg/dL Final   01/18/2023 147 (H) 74 - 99 mg/dL Final     Calcium   Date Value Ref Range Status   01/20/2023 8.3 (L) 8.6 - 10.2 mg/dL Final   01/19/2023 8.5 (L) 8.6 - 10.2 mg/dL Final   01/18/2023 8.3 (L) 8.6 - 10.2 mg/dL Final     Total Protein   Date Value Ref Range Status   01/17/2023 6.1 (L) 6.4 - 8.3 g/dL Final   01/16/2023 6.3 (L) 6.4 - 8.3 g/dL Final   12/30/2022 5.8 (L) 6.4 - 8.3 g/dL Final     Albumin   Date Value Ref Range Status   01/17/2023 3.6 3.5 - 5.2 g/dL Final   01/16/2023 3.8 3.5 - 5.2 g/dL Final   12/30/2022 3.6 3.5 - 5.2 g/dL Final     Total Bilirubin   Date Value Ref Range Status   01/17/2023 0.5 0.0 - 1.2 mg/dL Final   01/16/2023 0.4 0.0 - 1.2 mg/dL Final   12/30/2022 0.4 0.0 - 1.2 mg/dL Final     Alkaline Phosphatase   Date Value Ref Range Status   01/17/2023 98 35 - 104 U/L Final   01/16/2023 96 35 - 104 U/L Final   12/30/2022 94 35 - 104 U/L Final     AST   Date Value Ref Range Status   01/17/2023 9 0 - 31 U/L Final   01/16/2023 10 0 - 31 U/L Final   12/30/2022 11 0 - 31 U/L Final     ALT   Date Value Ref Range Status   01/17/2023 <5 0 - 32 U/L Final   01/16/2023 <5 0 - 32 U/L Final   12/30/2022 <5 0 - 32 U/L Final     Est, Glom Filt Rate   Date Value Ref Range Status   01/20/2023 24 >=60 mL/min/1.73 Final     Comment:     Pediatric calculator link  [email or web address]. [email or web address]/gfr_calculatorped  Effective Oct 3, 2022  These results are not intended for use in patients  <25years of age. eGFR results are calculated without  a race factor using the 2021 CKD-EPI equation. Careful  clinical correlation is recommended, particularly when  comparing to results calculated using previous equations. The CKD-EPI equation is less accurate in patients with  extremes of muscle mass, extra-renal metabolism of  creatinine, excessive creatinine ingestion, or following  therapy that affects renal tubular secretion. 01/19/2023 26 >=60 mL/min/1.73 Final     Comment:     Pediatric calculator link  [employer] [email or web address]. [email or web address] [email or web address]_[email or web address]  Effective Oct 3, 2022  These results are not intended for use in patients  <25years of age. eGFR results are calculated without  a race factor using the 2021 CKD-EPI equation. Careful  clinical correlation is recommended, particularly when  comparing to results calculated using previous equations. The CKD-EPI equation is less accurate in patients with  extremes of muscle mass, extra-renal metabolism of  creatinine, excessive creatinine ingestion, or following  therapy that affects renal tubular secretion.      01/18/2023 23 >=60 mL/min/1.73 Final     Comment:     Pediatric calculator link  [email or web address]. [email or web address]/kdoqi/gfr_calculatorped  Effective Oct 3, 2022  These results are not intended for use in patients  <25years of age. eGFR results are calculated without  a race factor using the 2021 CKD-EPI equation. Careful  clinical correlation is recommended, particularly when  comparing to results calculated using previous equations. The CKD-EPI equation is less accurate in patients with  extremes of muscle mass, extra-renal metabolism of  creatinine, excessive creatinine ingestion, or following  therapy that affects renal tubular secretion. GFR    Date Value Ref Range Status   10/16/2022 >60  Final   10/14/2022 59  Final   10/13/2022 >60  Final     Magnesium   Date Value Ref Range Status   12/30/2022 2.0 1.6 - 2.6 mg/dL Final   10/14/2022 2.1 1.6 - 2.6 mg/dL Final   10/08/2022 1.9 1.6 - 2.6 mg/dL Final     Phosphorus   Date Value Ref Range Status   01/20/2023 4.7 (H) 2.5 - 4.5 mg/dL Final   01/19/2023 4.9 (H) 2.5 - 4.5 mg/dL Final   01/18/2023 5.6 (H) 2.5 - 4.5 mg/dL Final     Recent Labs     01/19/23  0509   PH 7.355   PO2 74.8*   PCO2 64.0*   HCO3 34.9*   BE 8.1*   O2SAT 94.2         RADIOLOGY:  CT ABDOMEN PELVIS WO CONTRAST Additional Contrast? None   Final Result   No nephroureterolithiasis or hydronephrosis. Splenomegaly. Small volume of perihepatic ascites. At least moderate-sized partially imaged right pleural effusion with   bibasilar atelectasis. Several locules of gas which appear to be within the endometrium at the level   of the uterine fundus, of uncertain etiology or clinical significance. Please correlate clinically. Diffuse anasarca throughout the soft tissues. Cardiomegaly. XR CHEST PORTABLE   Final Result   Stable right pleural effusion.                Electronically signed by Mike Perez MD on 1/20/2023 at 8:42 AM

## 2023-01-20 NOTE — PLAN OF CARE
Problem: Discharge Planning  Goal: Discharge to home or other facility with appropriate resources  Outcome: Progressing     Problem: Respiratory - Adult  Goal: Achieves optimal ventilation and oxygenation  1/20/2023 1211 by Adalgisa Manning RN  Outcome: Progressing     Problem: Cardiovascular - Adult  Goal: Maintains optimal cardiac output and hemodynamic stability  1/20/2023 1211 by Adalgisa Manning RN  Outcome: Progressing     Problem: Cardiovascular - Adult  Goal: Absence of cardiac dysrhythmias or at baseline  Outcome: Progressing     Problem: Genitourinary - Adult  Goal: Absence of urinary retention  Outcome: Progressing     Problem: Genitourinary - Adult  Goal: Urinary catheter remains patent  1/20/2023 1211 by Adalgisa Manning RN  Outcome: Progressing     Problem: Chronic Conditions and Co-morbidities  Goal: Patient's chronic conditions and co-morbidity symptoms are monitored and maintained or improved  Outcome: Progressing     Problem: Skin/Tissue Integrity  Goal: Absence of new skin breakdown  Description: 1. Monitor for areas of redness and/or skin breakdown  2. Assess vascular access sites hourly  3. Every 4-6 hours minimum:  Change oxygen saturation probe site  4. Every 4-6 hours:  If on nasal continuous positive airway pressure, respiratory therapy assess nares and determine need for appliance change or resting period.   1/20/2023 1211 by Adalgisa Manning RN  Outcome: Progressing     Problem: Safety - Adult  Goal: Free from fall injury  1/20/2023 1211 by Adalgisa Manning RN  Outcome: Progressing     Problem: ABCDS Injury Assessment  Goal: Absence of physical injury  1/20/2023 1211 by Adalgisa Manning RN  Outcome: Progressing     Problem: Pain  Goal: Verbalizes/displays adequate comfort level or baseline comfort level  Outcome: Progressing

## 2023-01-20 NOTE — ACP (ADVANCE CARE PLANNING)
1/20/23 Transfer to [facility]. Pt from [facility] ([employer]), however, pt wants more rehab . Mary Jo at SNF aware pt has exhausted her skilled days- but they will work with her more. Needs signed [place] at discharge and rapid covid at discharge. Pt's insurance requires  Allyson Sever transport be arranged through [address] [place] [address] at discharge [address] [place] [address]: [phone]. CM/SS assigned to follow.  Electronically signed by Renae Schmidt RN-BC on 1/20/2023 at 12:19 PM

## 2023-01-20 NOTE — CONSULTS
[facility] MD Sarah Yun MD Theoplis Cordoba, MD Ruby Cohen, BRADLEY Reza, NUNU  Consultation  Patient's Name: Valente Lee  1/19/2023    Nephrologist: Sarah Otero MD    Reason for Consult:  Iván Blum  Requesting Physician:  Radha Eduardo MD    Chief Complaint:  sob    History Obtained From: Patient, chart    History of Present Ilness:    Ms. Dave Austin is a 67 y.o. woman known to service, admitted 1/16 with dyspnea and hypotension, subsequent diagnosis sepsis, acute on chronic hypoxic and hypercapnic respiratory failure in the setting of COPD exacerbation and pneumonia. Baseline creatinine 1.3 to 1.5 mg/dL. Creatinine on 1/3 was 1.3 mg/dL, increasing to 1.8 mg/dL as of 1/13, increasing again to 2.1 mg/dL as of 1/16 and remained stable as of this morning at 2.1 mg/dL. Urine output only partially recorded on 1/17, though by 1/18 was recorded 600 cc/day, and so far on day shift was made 200 cc.     Past Medical History:   Diagnosis Date    A-fib [facility])     Acute on chronic congestive heart failure (HCC)     Anxiety     Asthma     CAD (coronary artery disease) 01/21/2016    Cancer ([facility]  breast ca 2006    right lumpectomy    Chronic kidney disease     nephrolithiasis    COPD exacerbation ([address] 10/12/2022    Depression     Diabetes mellitus ([address] [address]     H/O mammogram     Hx MRSA infection     toe infection january 2012    Hyperlipidemia     Hypertension     Lateral epicondylitis     Morbid obesity (HCC)     SERENA on CPAP     Oxygen dependent     Parkinson's disease ([address]     Tubal ligation status        Past Surgical History:   Procedure Laterality Date    BREAST LUMPECTOMY      BREAST REDUCTION SURGERY      CARDIAC CATHETERIZATION  4/28/2014    Dr. Mitch Tran  01/11/2022    Dr. Laya Victoria  7/29/15    CT GUIDED CHEST TUBE  7/8/2022    CT GUIDED CHEST TUBE 7/8/2022 Laurie Licona MD [facility]    ENDOSCOPY, COLON, DIAGNOSTIC  7/19/15    GALLBLADDER SURGERY      LUMBAR LAMINECTOMY      TOE AMPUTATION      TONSILLECTOMY      UPPER GASTROINTESTINAL ENDOSCOPY      UPPER GASTROINTESTINAL ENDOSCOPY N/A 7/19/2022    EGD ESOPHAGOGASTRODUODENOSCOPY performed by Mag Mabry MD at [facility] History   Problem Relation Age of Onset    Cancer Mother         Lung Ca    Cancer Father         lung Ca    Diabetes Sister     Heart Disease Sister     Seizures Son     Other Brother         sepsis        reports that she has never smoked. She has never used smokeless tobacco. She reports that she does not drink alcohol and does not use drugs.     Allergies:  Ciprofloxacin, Codeine, and Digoxin and related    Current Medications:    dilTIAZem (CARDIZEM) tablet 60 mg, 3 times per day  sucralfate (CARAFATE) tablet 1 g, 4x Daily  rOPINIRole (REQUIP) tablet 1 mg, TID  atorvastatin (LIPITOR) tablet 20 mg, Nightly  ipratropium-albuterol (DUONEB) nebulizer solution 3 mL, 4x Daily  insulin glargine (LANTUS) injection vial 13 Units, BID  loperamide (IMODIUM) capsule 2 mg, 4x Daily PRN  LORazepam (ATIVAN) tablet 0.5 mg, Nightly  [Held by provider] bumetanide (BUMEX) tablet 2 mg, Daily  metoprolol succinate (TOPROL XL) extended release tablet 25 mg, BID  insulin lispro (HUMALOG) injection vial 0-4 Units, TID WC  insulin lispro (HUMALOG) injection vial 0-4 Units, Nightly  budesonide (PULMICORT) nebulizer suspension 500 mcg, BID  doxycycline (VIBRAMYCIN) 100 mg in sodium chloride 0.9 % 100 mL IVPB ([id number]), Q12H  cefTRIAXone (ROCEPHIN) 1,000 mg in sterile water 10 mL IV syringe, Q24H  sodium chloride flush 0.9 % injection 10 mL, 2 times per day  sodium chloride flush 0.9 % injection 10 mL, PRN  0.9 % sodium chloride infusion, PRN  promethazine (PHENERGAN) tablet 12.5 mg, Q6H PRN   Or  ondansetron (ZOFRAN) injection 4 mg, Q6H PRN  polyethylene glycol (GLYCOLAX) packet 17 g, Daily PRN  acetaminophen (TYLENOL) tablet 650 mg, Q6H PRN Or  acetaminophen (TYLENOL) suppository 650 mg, Q6H PRN  miconazole (MICOTIN) 2 % powder, BID  arformoterol tartrate (BROVANA) nebulizer solution 15 mcg, BID  0.9 % sodium chloride infusion, Continuous  pantoprazole (PROTONIX) tablet 40 mg, QAM AC  glucose chewable tablet 16 g, PRN  dextrose bolus 10% 125 mL, PRN   Or  dextrose bolus 10% 250 mL, PRN  glucagon (rDNA) injection 1 mg, PRN  dextrose 10 % infusion, Continuous PRN  apixaban (ELIQUIS) tablet 5 mg, BID  mirtazapine (REMERON) tablet 7.5 mg, Nightly  aspirin EC tablet 81 mg, Daily  carbidopa-levodopa (SINEMET CR)  MG per extended release tablet 1 tablet, TID  sertraline (ZOLOFT) tablet 50 mg, Daily  montelukast (SINGULAIR) tablet 10 mg, Nightly  [Held by provider] midodrine (PROAMATINE) tablet 5 mg, TID WC        Review of Systems:   Review of systems not obtained due to patient factors. Somnolent, delirious    Physical exam:   Vital signs reviewed  Age-appropriate morbidly obese white woman, though easily arousable, no apparent distress  NC/AT EOMI sclera conjunctive are clear and anicteric mucous membranes are moist  Neck soft supple trachea midline no bruit  Coarse breath sounds with crackles on the right, mildly prolonged expiratory phase but no wheeze  Regular rhythm normal S1 and S2  Abdomen soft nontender nondistended normal active bowel sounds. Abdominal pannus has nonpitting edema  Distal extremities, thighs, sacrum have substantial chronic type nonpitting edema, though no pitting edema  Pulses 1+ x4  Moves all 4 extremities  Cranial nerves II through XII grossly intact  Asleep, easily awakened, though falls right back asleep again. Does answer questions follow commands though history is at best rather vague  No rash or lesion on visible portions of integument    Data:   Labs:  Reviewed    Imaging:  CT ABDOMEN PELVIS WO CONTRAST Additional Contrast? None   Final Result   No nephroureterolithiasis or hydronephrosis. Splenomegaly. Small volume of perihepatic ascites. At least moderate-sized partially imaged right pleural effusion with   bibasilar atelectasis. Several locules of gas which appear to be within the endometrium at the level   of the uterine fundus, of uncertain etiology or clinical significance. Please correlate clinically. Diffuse anasarca throughout the soft tissues. Cardiomegaly. XR CHEST PORTABLE   Final Result   Stable right pleural effusion. On CT no hydro/signs of obstruction    Urine studies  U Na 21, U Cl 23 U prot:cr ratio 0.5    Assessment  1. Acute kidney injury, secondary to prerenal azotemia. Creatinine and UO are improving with IV fluid resuscitation     2. CKD stage III, Baseline creatinine 1.4 to 1.7 mg/dL, secondary to diabetic nephropathy and renal microvascular atherosclerotic disease     3. Anemia due to CKD, phlebotomy associated prolonged hospitalization     4. Acute hypercapnic and hypoxic respiratory failure due to COPD exacerbation and pneumonia. Does not appear hypervolemic. 5.  Morbid obesity, BMI 50.6 kg per metered square    6. Edema/anasarca, chronic, multifactorial, due to venous insufficiency in the setting of morbid obesity, relative immobility, likely diastolic dysfunction though it was \"indeterminate\" on echo, the does have pulmonary hypertension, mild right-sided heart failure     Recommendations  Continue IV fluid resuscitation, though will increase the rate somewhat  Repeat BMP this evening, adjust IV fluid content and rate as warranted  Continue intensive supportive care  Follow labs, UO  In the next several days will need to turn attention to diuresis    Thank you for the opportunity to participate in the care of your pleasant patient. We look forward to following along with you.         Electronically signed by Sarah Otero MD on 1/19/2023

## 2023-01-20 NOTE — PLAN OF CARE
Problem: Respiratory - Adult  Goal: Achieves optimal ventilation and oxygenation  Outcome: Progressing     Problem: Cardiovascular - Adult  Goal: Maintains optimal cardiac output and hemodynamic stability  Outcome: Progressing     Problem: Genitourinary - Adult  Goal: Urinary catheter remains patent  Outcome: Progressing     Problem: Skin/Tissue Integrity  Goal: Absence of new skin breakdown  Description: 1. Monitor for areas of redness and/or skin breakdown  2. Assess vascular access sites hourly  3. Every 4-6 hours minimum:  Change oxygen saturation probe site  4. Every 4-6 hours:  If on nasal continuous positive airway pressure, respiratory therapy assess nares and determine need for appliance change or resting period.   Outcome: Progressing     Problem: Safety - Adult  Goal: Free from fall injury  Outcome: Progressing     Problem: ABCDS Injury Assessment  Goal: Absence of physical injury  Outcome: Progressing

## 2023-01-20 NOTE — PROGRESS NOTES
Admitting Date and Time: 1/17/2023  5:16 PM  Admit Dx: NSTEMI (non-ST elevated myocardial infarction) ([facility] [I21.4]  Atrial fibrillation with rapid ventricular response (HCC) [I48.91]  Recurrent right pleural effusion [J90]  Hypotension, unspecified hypotension type [I95.9]  Acute on chronic respiratory failure with hypoxia and hypercapnia (HCC) [J96.21, J96.22]    Subjective:    No complaints no questions at all  Per RN: afmerryr    ROS: denies fever, chills, cp, sob, n/v, HA unless stated above.     linezolid  600 mg Oral 2 times per day    [Held by provider] dilTIAZem  60 mg Oral 3 times per day    sucralfate  1 g Oral 4x Daily    rOPINIRole  1 mg Oral TID    atorvastatin  20 mg Oral Nightly    ipratropium-albuterol  1 vial Inhalation 4x Daily    insulin glargine  13 Units SubCUTAneous BID    LORazepam  0.5 mg Oral Nightly    [Held by provider] bumetanide  2 mg Oral Daily    metoprolol succinate  25 mg Oral BID    insulin lispro  0-4 Units SubCUTAneous TID WC    insulin lispro  0-4 Units SubCUTAneous Nightly    budesonide  0.5 mg Nebulization BID    doxycycline (VIBRAMYCIN) IV  100 mg IntraVENous Q12H    cefTRIAXone (ROCEPHIN) IV  1,000 mg IntraVENous Q24H    sodium chloride flush  10 mL IntraVENous 2 times per day    miconazole   Topical BID    arformoterol tartrate  15 mcg Nebulization BID    pantoprazole  40 mg Oral QAM AC    apixaban  5 mg Oral BID    mirtazapine  7.5 mg Oral Nightly    aspirin  81 mg Oral Daily    carbidopa-levodopa  1 tablet Oral TID    sertraline  50 mg Oral Daily    montelukast  10 mg Oral Nightly    [Held by provider] midodrine  5 mg Oral TID WC     loperamide, 2 mg, 4x Daily PRN  sodium chloride flush, 10 mL, PRN  sodium chloride, , PRN  promethazine, 12.5 mg, Q6H PRN   Or  ondansetron, 4 mg, Q6H PRN  polyethylene glycol, 17 g, Daily PRN  acetaminophen, 650 mg, Q6H PRN   Or  acetaminophen, 650 mg, Q6H PRN  glucose, 4 tablet, PRN  dextrose bolus, 125 mL, PRN   Or  dextrose bolus, 250 mL, PRN  glucagon (rDNA), 1 mg, PRN  dextrose, , Continuous PRN       Objective:    /63   Pulse 88   Temp (!) 96.7 °F (35.9 °C) (Bladder)   Resp 20   Ht 5' (1.524 m)   Wt 273 lb 5 oz (124 kg)   SpO2 91%   BMI 53.38 kg/m²   General Appearance: alert and oriented to person, place and time and in no acute distress  Skin: warm and dry  Head: normocephalic and atraumatic  Eyes: pupils equal, round, and reactive to light, extraocular eye movements intact, conjunctivae normal  Neck: neck supple and non tender without mass   Pulmonary/Chest: clear to auscultation bilaterally- no wheezes, rales or rhonchi, normal air movement, no respiratory distress  Cardiovascular: normal rate, normal S1 and S2 and no carotid bruits  Abdomen: soft, non-tender, non-distended, normal bowel sounds, no masses or organomegaly  Extremities: no cyanosis, no clubbing and no  edema  Neurologic: no cranial nerve deficit and speech normal      Recent Labs     01/18/23 0415 01/19/23 0427 01/20/23  0507    141 141   K 5.2* 4.9 4.8   CL 97* 97* 99   CO2 39* 33* 33*   BUN 47* 54* 55*   CREATININE 2.2* 2.0* 2.1*   GLUCOSE 147* 235* 170*   CALCIUM 8.3* 8.5* 8.3*         No results for input(s): ALKPHOS, PROT, LABALBU, BILITOT, AST, ALT in the last 72 hours. Recent Labs     01/18/23 0415 01/19/23 0427 01/20/23  0507   WBC 9.9 10.6 8.9   RBC 2.84* 2.91* 3.17*   HGB 7.3* 7.7* 8.0*   HCT 26.5* 26.5* 30.1*   MCV 93.3 91.1 95.0   MCH 25.7* 26.5 25.2*   MCHC 27.5* 29.1* 26.6*   RDW 16.0* 16.3* 16.4*    210 231   MPV 10.3 10.4 10.5             Radiology:   CT ABDOMEN PELVIS WO CONTRAST Additional Contrast? None   Final Result   No nephroureterolithiasis or hydronephrosis. Splenomegaly. Small volume of perihepatic ascites. At least moderate-sized partially imaged right pleural effusion with   bibasilar atelectasis.       Several locules of gas which appear to be within the endometrium at the level   of the uterine fundus, of uncertain etiology or clinical significance. Please correlate clinically. Diffuse anasarca throughout the soft tissues. Cardiomegaly. XR CHEST PORTABLE   Final Result   Stable right pleural effusion. Assessment:  Principal Problem:    Acute on chronic respiratory failure with hypoxia and hypercapnia (HCC)  Resolved Problems:    * No resolved hospital problems. *      Plan: History of present illness from History and Physical:  This is a 67 y.o. female  has a past medical history of A-fib ([address] Acute on chronic congestive heart failure ([address] Anxiety, Asthma, CAD (coronary artery disease), Cancer ([address] Chronic kidney disease, COPD exacerbation ([address] Depression, Diabetes mellitus ([address] H/O mammogram, Hx MRSA infection, Hyperlipidemia, Hypertension, Lateral epicondylitis, Morbid obesity ([address] SERENA on CPAP, Oxygen dependent, Parkinson's disease ([address] and Tubal ligation status. presented with SOB, hypotension  for last few days prior to arrival to the hospital. SOB is associated with some cough, nonproductive but no fever or chills reported. Initially SOB was mild, intermittent but gradually getting more persistent. Started gradually. Exacerbated by general activity or exertion. Relieved only partially by rest. In ED patient was found to be hypotensive with Afib RVR and respiratory failure and was placed on BIPAP. BP improved after midodrine dose. The patient was seen and examined at bedside, appears alert and awake with no acute distress and is able to answer simple  questions.  On direct questioning, patient denied any  resting ongoing chest pain, hemoptysis, productive cough, fever, ongoing palpitation, active abdominal pain, hematemesis, rectal bleeding, blessing, hematuria, any other  and GI complaints, and any new focal neuro deficits     Acute hypercapnic respiratory failure contributed to by: underlying copd (not in exacerbation)   SERENA noncompliant with CPAP & Obesity induced hypoventilation syndrome  No pna on image; bipap today. O2 today stayed at 4 liters with minor adjustments  o2, nebs    P A fib eliquis no other dvt prophy needed, rate control  Then RVR in pm of 1/18 so ccb gtts 1/19 switched to po then held on 1/20. Gave bb  Uti: Ceftriaxone empiric abx: until Cx resulted. So far gram (+) cocci  Mandeep: inc ivf  Dm: diabetic diet, ssc  Parkinson l dopa  Full code  Dispo: pt, consider place    NOTE: This report was transcribed using voice recognition software. Every effort was made to ensure accuracy; however, inadvertent computerized transcription errors may be present.      Electronically signed by Lyric Smart MD on 1/20/2023 at 6:58 PM

## 2023-01-20 NOTE — CONSULTS
[address] [facility], [address] [place] [address]  Phone [phone]   [phone]      Admit Date: 2023  5:16 PM  Pt Name: Andrea Rodriguez  MRN: [id number]  : 1950  Reason for Consult:    Chief Complaint   Patient presents with    Respiratory Distress     Normally on 3L NC, came in on CPAP, given total of 50mcg push dose epi for low BP by EMS     Requesting Physician:  Carol Buckner MD  PCP: Jacquie Thomas MD  History Obtained From:  patient, chart   ID consulted for NSTEMI (non-ST elevated myocardial infarction) [facility]) [I21.4]  Atrial fibrillation with rapid ventricular response (HCC) [I48.91]  Recurrent right pleural effusion [J90]  Hypotension, unspecified hypotension type [I95.9]  Acute on chronic respiratory failure with hypoxia and hypercapnia ([address] [J96.21, J96.22]  on hospital day [facility]       Chief Complaint   Patient presents with    Respiratory Distress     Normally on 3L NC, came in on CPAP, given total of 50mcg push dose epi for low BP by EMS     HISTORYOF PRESENT ILLNESS   Andrea Rodriguez is a 67 y.o. female who presents with   has a past medical history of A-fib ([address] Acute on chronic congestive heart failure ([address] Anxiety, Asthma, CAD (coronary artery disease), Cancer ([address] Chronic kidney disease, COPD exacerbation ([address] Depression, Diabetes mellitus ([address] H/O mammogram, Hx MRSA infection, Hyperlipidemia, Hypertension, Lateral epicondylitis, Morbid obesity ([address] SERENA on CPAP, Oxygen dependent, Parkinson's disease ([address] and Tubal ligation status.    ED TRIAGEVITALS  BP: 122/76, Temp: 96.8 °F (36 °C), Heart Rate: 75, Resp: 18, SpO2: 92 %  HPI:  ID was consulted on 23 for infection management  Pt presented to ER on 2023 with diagnosis of  NSTEMI (non-ST elevated myocardial infarction) ([address] [I21.4]  Atrial fibrillation with rapid ventricular response (HCC) [I48.91]  Recurrent right pleural effusion [J90]  Hypotension, unspecified hypotension type [I95.9]  Acute on chronic respiratory failure with hypoxia and hypercapnia (HCC) [J96.21, J96.22]  PT CAME IN DUE TO BLE LEG SWELLING PT HAD LEFT LE BLISTER   SOB/INC WEIGHT   ADMITTED FOR CHF/COPD EXACERBATION Acute on chronic hypoxic and hypercapnic respiratory failure  REQUIRING ICU CONSULT   PT IN ICU HAS NO C/O F/C/N/V/D/  WBC8.9 CR2.1   UA CLOUDY KETONES PROTEIN LE WBC MOD BACTERIA   URINE CX VRE  LEFT LE CONS   Currently on  linezolid (ZYVOX) tablet 600 mg, 2 times per daY  doxycycline (VIBRAMYCIN) 100 mg in sodium chloride 0.9 % 100 mL IVPB ([id number]), Q12H  cefTRIAXone (ROCEPHIN) 1,000 mg in sterile water 10 mL IV syringe, Q24H  miconazole (MICOTIN) 2 % powder, BID     SEEN BY RENAL       REVIEW OF SYSTEMS     CONSTITUTIONAL:   No fever, chills, weight loss  ALLERGIES:    No rash or itch  EYES:     No visual changes  ENT:      No  hearing loss or sore throat  CARDIOVASCULAR:   No chest pain or palpitations  RESPIRATORY:   No cough, sob  ENDOCRINE:    No increase thirst, urination   HEME-LYMPH:   No easy bruising or bleeding disorder  GI:     No nausea, vomiting or diarrhea  :     No urinary complaints  NEURO:    No seizures, stroke, HA  MUSCULOSKELETAL:  No muscle aches or pain, no joint pain  SKIN:     No rash, ulcers or wounds  PSYCH:    No depression or anxiety    Medications Prior to Admission: metoprolol succinate (TOPROL XL) 25 MG extended release tablet, Take 25 mg by mouth in the morning and at bedtime  cephALEXin (KEFLEX) 500 MG capsule, Take 1 capsule by mouth 4 times daily for 10 days  doxycycline hyclate (VIBRA-TABS) 100 MG tablet, Take 1 tablet by mouth 2 times daily for 10 days  BiPAP Machine MISC, by Does not apply route Nightly.  12/6 30% PER NURSING HOME PAPERWORK  glucagon, rDNA, 1 MG injection, Inject 1 mg into the muscle as needed for Low blood sugar  Glucose (TRUEPLUS) 15 GM/32ML GEL, Take 15 g by mouth as needed  hydrOXYzine pamoate (VISTARIL) 25 MG capsule, Take 25 mg by mouth 3 times daily as needed for Itching  [DISCONTINUED] metoprolol succinate (TOPROL XL) 25 MG extended release tablet, Take 1 tablet by mouth 2 times daily (Patient taking differently: Take 25 mg by mouth daily)  acetaminophen (TYLENOL) 325 MG tablet, Take 650 mg by mouth every 4 hours as needed for Pain  apixaban (ELIQUIS) 5 MG TABS tablet, Take 1 tablet by mouth 2 times daily  bumetanide (BUMEX) 2 MG tablet, Take 1 tablet by mouth daily  midodrine (PROAMATINE) 5 MG tablet, Take 1 tablet by mouth 3 times daily (with meals)  LORazepam (ATIVAN) 0.5 MG tablet, Take 0.5 mg by mouth nightly.   insulin lispro (HUMALOG) 100 UNIT/ML injection vial, Inject into the skin 3 times daily (before meals) Sliding scale 200-249=2 UNITS 250-299=4 UNITS 300-349=6 UNITS 350-399=8 UNITS 400-500=10 UNITS  sertraline (ZOLOFT) 50 MG tablet, Take 50 mg by mouth daily  loperamide (IMODIUM) 2 MG capsule, Take 2 mg by mouth 4 times daily as needed for Diarrhea  OXYGEN, Inhale 3 L into the lungs continuous  docusate sodium (COLACE, DULCOLAX) 100 MG CAPS, Take 100 mg by mouth 2 times daily as needed for Constipation  insulin glargine (LANTUS) 100 UNIT/ML injection vial, Inject 13 Units into the skin 2 times daily  atorvastatin (LIPITOR) 20 MG tablet, Take 20 mg by mouth nightly  benzoin compound solution, Apply 1 Applicatorful topically nightly Apply topically to right toe (Patient not taking: No sig reported)  ipratropium-albuterol (DUONEB) 0.5-2.5 (3) MG/3ML SOLN nebulizer solution, Inhale 1 vial into the lungs 4 times daily  miconazole (MICOTIN) 2 % powder, Apply 1 each topically 2 times daily Apply topically under breasts twice daily  pantoprazole (PROTONIX) 40 MG tablet, Take 40 mg by mouth every morning  mirtazapine (REMERON) 15 MG tablet, Take 7.5 mg by mouth nightly  budesonide-formoterol (SYMBICORT) 160-4.5 MCG/ACT AERO, Inhale 2 puffs into the lungs 2 times daily  carbidopa-levodopa (SINEMET CR)  MG per extended release tablet, Take 2 tablets by mouth in the morning and 2 tablets at noon and 2 tablets in the evening. rOPINIRole (REQUIP) 1 MG tablet, Take 1 tablet by mouth in the morning and 1 tablet at noon and 1 tablet before bedtime. sucralfate (CARAFATE) 1 GM tablet, Take 1 tablet by mouth in the morning and 1 tablet at noon and 1 tablet in the evening and 1 tablet before bedtime.   aspirin EC 81 MG EC tablet, Take 1 tablet by mouth daily  montelukast (SINGULAIR) 10 MG tablet, Take 10 mg by mouth nightly  CURRENT MEDICATIONS     Current Facility-Administered Medications:     linezolid (ZYVOX) tablet 600 mg, 600 mg, Oral, 2 times per day, Daxa Bruce MD    dilTIAZem (CARDIZEM) tablet 60 mg, 60 mg, Oral, 3 times per day, Daxa Bruce MD, 60 mg at 01/20/23 0616    sucralfate (CARAFATE) tablet 1 g, 1 g, Oral, 4x Daily, Harsh Rodriguez MD, 1 g at 01/20/23 0816    rOPINIRole (REQUIP) tablet 1 mg, 1 mg, Oral, TID, Harsh Rodriguez MD, 1 mg at 01/20/23 0816    atorvastatin (LIPITOR) tablet 20 mg, 20 mg, Oral, Nightly, Harsh Rodriguez MD, 20 mg at 01/19/23 2121    ipratropium-albuterol (DUONEB) nebulizer solution 3 mL, 1 vial, Inhalation, 4x Daily, Harsh Rodriguez MD, 3 mL at 01/20/23 5857    insulin glargine (LANTUS) injection vial 13 Units, 13 Units, SubCUTAneous, BID, Harsh Rodriguez MD, 13 Units at 01/20/23 8123    loperamide (IMODIUM) capsule 2 mg, 2 mg, Oral, 4x Daily PRN, Harsh Rodriguez MD    LORazepam (ATIVAN) tablet 0.5 mg, 0.5 mg, Oral, Nightly, Harsh Rodriguez MD, 0.5 mg at 01/19/23 2121    [Held by provider] bumetanide (BUMEX) tablet 2 mg, 2 mg, Oral, Daily, Harsh Rodriguez MD    metoprolol succinate (TOPROL XL) extended release tablet 25 mg, 25 mg, Oral, BID, Harsh Rodriguez MD, 25 mg at 01/20/23 0817    insulin lispro (HUMALOG) injection vial 0-4 Units, 0-4 Units, SubCUTAneous, TID WC, Harsh Rodriguez MD, 1 Units at 01/19/23 1720    insulin lispro (HUMALOG) injection vial 0-4 Units, 0-4 Units, SubCUTAneous, Nightly, Bonnie Vu MD, 4 Units at 01/18/23 2158    budesonide (PULMICORT) nebulizer suspension 500 mcg, 0.5 mg, Nebulization, BID, Bonnie Vu MD, 500 mcg at 01/20/23 5911    doxycycline (VIBRAMYCIN) 100 mg in sodium chloride 0.9 % 100 mL IVPB ([id number]), 100 mg, IntraVENous, Q12H, Bonnie Vu MD, Stopped at 01/20/23 0815    cefTRIAXone (ROCEPHIN) 1,000 mg in sterile water 10 mL IV syringe, 1,000 mg, IntraVENous, Q24H, RASHARD Montez CNP, 1,000 mg at 01/19/23 2122    sodium chloride flush 0.9 % injection 10 mL, 10 mL, IntraVENous, 2 times per day, Bonnie Vu MD, 10 mL at 01/20/23 0820    sodium chloride flush 0.9 % injection 10 mL, 10 mL, IntraVENous, PRN, Bonnie Vu MD    0.9 % sodium chloride infusion, , IntraVENous, PRN, Bnonie Vu MD    promethazine (PHENERGAN) tablet 12.5 mg, 12.5 mg, Oral, Q6H PRN **OR** ondansetron (ZOFRAN) injection 4 mg, 4 mg, IntraVENous, Q6H PRN, Bonnie Vu MD    polyethylene glycol (GLYCOLAX) packet 17 g, 17 g, Oral, Daily PRN, Bonnie Vu MD    acetaminophen (TYLENOL) tablet 650 mg, 650 mg, Oral, Q6H PRN, 650 mg at 01/19/23 1123 **OR** acetaminophen (TYLENOL) suppository 650 mg, 650 mg, Rectal, Q6H PRN, Bonnie Vu MD    miconazole (MICOTIN) 2 % powder, , Topical, BID, RASHARD Montez - CNP, Given at 01/20/23 0821    arformoterol tartrate (BROVANA) nebulizer solution 15 mcg, 15 mcg, Nebulization, BID, RASHARD Marshall - CNP, 15 mcg at 01/20/23 0528    0.9 % sodium chloride infusion, , IntraVENous, Continuous, True Postal, MD, Last Rate: 125 mL/hr at 01/20/23 0554, Rate Verify at 01/20/23 0554    pantoprazole (PROTONIX) tablet 40 mg, 40 mg, Oral, QAM AC, Aaron Conroyginski, APRN - CNP, 40 mg at 01/20/23 0615    glucose chewable tablet 16 g, 4 tablet, Oral, PRN, Jackqueline Snyder, APRN - CNP    dextrose bolus 10% 125 mL, 125 mL, IntraVENous, PRN **OR** dextrose bolus 10% 250 mL, 250 mL, IntraVENous, PRN, Jackqueline Snyder, APRN - CNP    glucagon (rDNA) injection 1 mg, 1 mg, SubCUTAneous, PRN, RASHARD Engle CNP    dextrose 10 % infusion, , IntraVENous, Continuous PRN, RASHARD Engle CNP    apixaban (ELIQUIS) tablet 5 mg, 5 mg, Oral, BID, RASHADR Engle - CNP, 5 mg at 01/20/23 0816    mirtazapine (REMERON) tablet 7.5 mg, 7.5 mg, Oral, Nightly, RASHARD Engle CNP, 7.5 mg at 01/19/23 2122    aspirin EC tablet 81 mg, 81 mg, Oral, Daily, RASHARD Marshall - CNP, 81 mg at 01/20/23 0816    carbidopa-levodopa (SINEMET CR)  MG per extended release tablet 1 tablet, 1 tablet, Oral, TID, RASHARD Engle CNP, 1 tablet at 01/20/23 0818    sertraline (ZOLOFT) tablet 50 mg, 50 mg, Oral, Daily, RASHARD Engle CNP, 50 mg at 01/20/23 0817    montelukast (SINGULAIR) tablet 10 mg, 10 mg, Oral, Nightly, RASHARD Engle - CNP, 10 mg at 01/19/23 2121    [Held by provider] midodrine (PROAMATINE) tablet 5 mg, 5 mg, Oral, TID WC, RASHARD Marshall CNP  ALLERGIES     Ciprofloxacin, Codeine, and Digoxin and related  Immunization History   Administered Date(s) Administered    COVID-19, PFIZER GRAY top, DO NOT Dilute, (age 15 y+), IM, 30 mcg/0.3 mL 05/24/2022    Influenza Vaccine, unspecified formulation 10/15/2014    Influenza Virus Vaccine 10/31/2008, 10/05/2011    Influenza, High Dose (Fluzone 65 yrs and older) 09/22/2015, 11/21/2016, 09/19/2017, 09/25/2019    Influenza, Triv, inactivated, subunit, adjuvanted, IM (Fluad 65 yrs and older) 01/03/2019    Pneumococcal Conjugate 13-valent ([id number]) 05/27/2016    Pneumococcal Polysaccharide ([id number]) 12/21/2012, 01/29/2018    Td, unspecified formulation 03/11/2014    Tdap (Boostrix, Adacel) 09/30/2015    Zoster Live (Zostavax) 06/25/2013    Zoster Recombinant (Shingrix) 12/10/2019      Internal Administration   First Dose      Second Dose COVID-19, PFIZER GRAY top, DO NOT Dilute, (age 15 y+), IM, 30 mcg/0.3 mL   05/24/2022       Last COVID Lab SARS-CoV-2, PCR (no units) Date Value   10/05/2022 Not Detected     SARS-CoV-2 RNA, RT PCR (no units)   Date Value   05/11/2022 NOT DETECTED     SARS-CoV-2, NAAT (no units)   Date Value   01/17/2023 Not Detected          PAST MEDICAL HISTORY     Past Medical History:   Diagnosis Date    A-fib [facility])     Acute on chronic congestive heart failure (HCC)     Anxiety     Asthma     CAD (coronary artery disease) 01/21/2016    Cancer ([facility]  breast ca 2006    right lumpectomy    Chronic kidney disease     nephrolithiasis    COPD exacerbation ([facility] 10/12/2022    Depression     Diabetes mellitus ([facility]     H/O mammogram     Hx MRSA infection     toe infection january 2012    Hyperlipidemia     Hypertension     Lateral epicondylitis     Morbid obesity (HCC)     SERENA on CPAP     Oxygen dependent     Parkinson's disease ([facility]     Tubal ligation status      SURGICAL HISTORY       Past Surgical History:   Procedure Laterality Date    BREAST LUMPECTOMY      BREAST REDUCTION SURGERY      CARDIAC CATHETERIZATION  4/28/2014    Dr. Talia Aj  01/11/2022    Dr. Sharan Rowe  7/29/15    CT GUIDED CHEST TUBE  7/8/2022    CT GUIDED CHEST TUBE 7/8/2022 Gian Stevens MD [facility]    ENDOSCOPY, COLON, DIAGNOSTIC  7/19/15    GALLBLADDER SURGERY      LUMBAR LAMINECTOMY      TOE AMPUTATION      TONSILLECTOMY      UPPER GASTROINTESTINAL ENDOSCOPY      UPPER GASTROINTESTINAL ENDOSCOPY N/A 7/19/2022    EGD ESOPHAGOGASTRODUODENOSCOPY performed by Natalia Vegas MD at [address] HISTORY       Family History   Problem Relation Age of Onset    Cancer Mother         Lung Ca    Cancer Father         lung Ca    Diabetes Sister     Heart Disease Sister     Seizures Son     Other Brother         sepsis     SOCIAL HISTORY       Social History     Socioeconomic History    Marital status:       Spouse name: None    Number of children: None    Years of education: None    Highest education level: None   Tobacco Use Smoking status: Never    Smokeless tobacco: Never   Vaping Use    Vaping Use: Never used   Substance and Sexual Activity    Alcohol use: No    Drug use: No    Sexual activity: Never     PHYSICAL EXAM        Vitals:    Vitals:    01/20/23 0600 01/20/23 0700 01/20/23 0800 01/20/23 0817   BP: (!) 146/80 (!) 143/76 122/76 122/76   Pulse: 72 83 74 75   Resp:   18    Temp:   96.8 °F (36 °C)    TempSrc:   Bladder    SpO2: (!) 89% 90% 92%    Weight: 273 lb 5 oz (124 kg)      Height:            CONSTITUTIONAL:  awake, alert, cooperative, no apparent distress, and appears stated age  ENT:  Normocephalic, without obvious abnormality, atraumatic, sinuses nontender on palpation, external ears without lesions, oral pharynx with moist mucus membranes,   NECK:  Supple, symmetrical, trachea midline, no adenopathy, thyroid symmetric, not enlarged and no tenderness, skin normal  LUNGS:  No increased work of breathing, DEC to auscultation bilaterally, no crackles or wheezing  CARDIOVASCULAR:  Normal apical impulse, regular rate and rhythm, normal S1 and S2, no S3 or S4, and no murmur noted  ABDOMEN:  No scars, normal bowel sounds, soft, non-distended, non-tender, no masses palpated   CHEST/BREASTS:  Breasts symmetrical   GENITAL/URINARY:    MUSCULOSKELETAL:  There is no redness, warmth, or swelling of the joints. Full range of motion noted. EDEMA LEFT LE BLISTER HEALING        NEUROLOGIC:  Awake, alert, oriented to name, place and time. Cranial nerves II-XII are grossly intact. SKIN:  no bruising or bleeding and normal skin color, texture, turgor       Peripheral Intravenous Line:  Peripheral IV 01/17/23 Left Antecubital (Active)   Site Assessment Clean, dry & intact 01/20/23 0800   Line Status Flushed;Capped;Normal saline locked;Brisk blood return 01/20/23 0800   Line Care Connections checked and tightened; Chlorhexidine wipes; Line pulled back;Ports disinfected;Cap changed 01/20/23 0800   Phlebitis Assessment No symptoms 01/20/23 0800   Infiltration Assessment 0 01/20/23 0800   Alcohol Cap Used No 01/20/23 0800   Dressing Status Clean, dry & intact 01/20/23 0800   Dressing Type Transparent 01/20/23 0800   Dressing Intervention Other (Comment) 01/20/23 0800       Peripheral IV 01/18/23 Left;Ventral Forearm (Active)   Site Assessment Clean, dry & intact 01/20/23 0800   Line Status Flushed;Brisk blood return;Infusing;Capped 01/20/23 0800   Line Care Connections checked and tightened;Chlorhexidine wipes;Line pulled back;Ports disinfected;Cap changed 01/20/23 0800   Phlebitis Assessment No symptoms 01/20/23 0800   Infiltration Assessment 0 01/20/23 0800   Alcohol Cap Used Yes 01/20/23 0800   Dressing Status Clean, dry & intact 01/20/23 0800   Dressing Type Transparent 01/20/23 0800   Dressing Intervention Other (Comment) 01/20/23 0800         Drain(s):  Urinary Catheter 01/18/23 Diaz-Temperature (Active)   $ Urethral catheter insertion $ Not inserted for procedure 01/18/23 0319   Catheter Indications Need for fluid volume management of the critically ill patient in a critical care setting 01/20/23 0800   Site Assessment Pink;Moist;No urethral drainage 01/20/23 0800   Urine Color Yellow 01/20/23 0800   Urine Appearance Cloudy 01/20/23 0800   Collection Container Standard 01/20/23 0800   Securement Method Securing device (Describe) 01/20/23 0800   Catheter Care Completed Yes 01/20/23 0800   Catheter Best Practices  Drainage tube clipped to bed;Catheter secured to thigh;Tamper seal intact;Bag below bladder;Bag not on floor;Lack of dependent loop in tubing;Drainage bag less than half full 01/20/23 0800   Status Draining;Patent 01/20/23 0800   Output (mL) 300 mL 01/20/23 0530     Airway:        DIAGNOSTIC RESULTS   RADIOLOGY:   CT ABDOMEN PELVIS WO CONTRAST Additional Contrast? None    Result Date: 1/18/2023  EXAMINATION: CT OF THE ABDOMEN AND PELVIS WITHOUT CONTRAST 1/18/2023 5:47 am TECHNIQUE: CT of the abdomen and pelvis was performed  without the administration of intravenous contrast. Multiplanar reformatted images are provided for review. Automated exposure control, iterative reconstruction, and/or weight based adjustment of the mA/kV was utilized to reduce the radiation dose to as low as reasonably achievable. COMPARISON: October 8, 2022 HISTORY: ORDERING SYSTEM PROVIDED HISTORY: R/O obstructive uropathy TECHNOLOGIST PROVIDED HISTORY: Reason for exam:->R/O obstructive uropathy Additional Contrast?->None FINDINGS: Lower Chest: The heart is enlarged. There is a small pericardial effusion and at least a moderate size right pleural effusion is partially imaged. A trace left pleural effusion is noted. Left lower lobe subsegmental atelectasis is noted. Organs: The gallbladder is surgically absent. The liver has an unremarkable unenhanced appearance. The spleen is enlarged measuring 15.9 cm in craniocaudal dimension. The unenhanced pancreas demonstrates diffuse fatty atrophy. The kidneys are without hydronephrosis or radiopaque calculus. GI/Bowel: No evidence of a bowel obstruction, free air or pneumatosis. Portions of the colon are decompressed, limiting assessment for mucosal based abnormalities. The appendix is not confidently visualized. No obvious pericecal inflammatory changes to suggest acute appendicitis. Pelvis: The urinary bladder is decompressed around a Diaz catheter. A small amount of the intraluminal gas in the urinary bladder may be related to recent instrumentation. A small amount of gas is present in the uterine fundus, apparently within the endometrial canal.  The ovaries are not confidently visualized. There are scattered visible but nonenlarged pelvic lymph nodes. Peritoneum/Retroperitoneum: A small amount of ascites is present, predominantly in a perihepatic distribution. The abdominal aorta is mildly calcified without evidence of aneurysm. No retroperitoneal lymphadenopathy or mass.  Bones/Soft Tissues: No acute osseous abnormality or evidence of an aggressive osseous lesion. Anasarca is present throughout the soft tissues. No nephroureterolithiasis or hydronephrosis. Splenomegaly. Small volume of perihepatic ascites. At least moderate-sized partially imaged right pleural effusion with bibasilar atelectasis. Several locules of gas which appear to be within the endometrium at the level of the uterine fundus, of uncertain etiology or clinical significance. Please correlate clinically. Diffuse anasarca throughout the soft tissues. Cardiomegaly. XR FOOT RIGHT (MIN 3 VIEWS)    Result Date: 1/16/2023  EXAMINATION: THREE XRAY VIEWS OF THE RIGHT FOOT 1/16/2023 12:40 pm COMPARISON: None. HISTORY: ORDERING SYSTEM PROVIDED HISTORY: Evaluate big toe wound TECHNOLOGIST PROVIDED HISTORY: Reason for exam:->Evaluate big toe wound FINDINGS: Soft tissue wound evident at the great toe. No radiographically visible acute osteomyelitis. 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 No radiopaque foreign body or soft tissue emphysema. Plantar heel spur noted. No evident acute osteomyelitis. Great toe soft tissue ulcer. Soft tissue swelling. See above. XR CHEST PORTABLE    Result Date: 1/17/2023  EXAMINATION: ONE XRAY VIEW OF THE CHEST 1/17/2023 5:38 pm COMPARISON: 01/16/2023 HISTORY: ORDERING SYSTEM PROVIDED HISTORY: sob TECHNOLOGIST PROVIDED HISTORY: Reason for exam:->sob FINDINGS: The lungs are without acute focal process. Stable right pleural effusion. No pneumothorax. Stable heart size. The osseous structures are without acute process. Stable right pleural effusion.      XR CHEST PORTABLE    Result Date: 1/16/2023  EXAMINATION: ONE XRAY VIEW OF THE CHEST 1/16/2023 8:53 am COMPARISON: 30 December 2022 HISTORY: ORDERING SYSTEM PROVIDED HISTORY: SOB TECHNOLOGIST PROVIDED HISTORY: Reason for exam:->SOB FINDINGS: Stable right pleural effusion with adjacent atelectasis and or infiltrate. Stable cardiomediastinal silhouette. No new abnormal findings. Stable right-sided pleural effusion with adjacent atelectasis and or infiltrate. XR CHEST 1 VIEW    Result Date: 12/30/2022  EXAMINATION: ONE XRAY VIEW OF THE CHEST 12/30/2022 3:21 pm COMPARISON: 12/19/2022 HISTORY: ORDERING SYSTEM PROVIDED HISTORY: SOB TECHNOLOGIST PROVIDED HISTORY: Reason for exam:->SOB FINDINGS: The heart is enlarged. There is a right pleural effusion right lung base atelectasis. I cannot exclude partial collapse of the right lower lobe. The left lung is clear. There is no pneumothorax. 1. Right pleural effusion right lung base atelectasis. 2. I cannot exclude partial collapse of the right lower lobe 3. Cardiomegaly     LABS  Recent Labs     01/18/23 0415 01/19/23 0427 01/20/23  0507   WBC 9.9 10.6 8.9   HGB 7.3* 7.7* 8.0*   HCT 26.5* 26.5* 30.1*   MCV 93.3 91.1 95.0    210 231     Recent Labs     01/17/23  1723 01/17/23  1723 01/18/23  0415 01/19/23  0427 01/20/23  0507     --  141 141 141   K 4.8   < > 5.2* 4.9 4.8   CL 94*  --  97* 97* 99   CO2 37*  --  39* 33* 33*   BUN 44*  --  47* 54* 55*   CREATININE 2.1*  --  2.2* 2.0* 2.1*   LABGLOM 24  --  23 26 24   GLUCOSE 94  --  147* 235* 170*   PROT 6.1*  --   --   --   --    LABALBU 3.6  --   --   --   --    CALCIUM 9.3  --  8.3* 8.5* 8.3*   BILITOT 0.5  --   --   --   --    ALKPHOS 98  --   --   --   --    AST 9  --   --   --   --    ALT <5  --   --   --   --     < > = values in this interval not displayed.      Recent Labs     01/18/23 0415   PROCAL 0.33*     Lab Results   Component Value Date    CRP 5.0 (H) 07/06/2022    CRP 0.7 (H) 10/25/2021    CRP 13.5 (H) 02/03/2021     Lab Results   Component Value Date    SEDRATE 6 07/06/2022    SEDRATE 15 10/24/2021    SEDRATE 115 (H) 02/03/2021     No results found for: [id number]  Lab Results   Component Value Date/Time    ADVIRPCR Not Detected 10/05/2022 11:19 AM    BPARAPPCR Not Detected 10/05/2022 11:19 AM    BPPTXPPCR Not Detected 10/05/2022 11:19 AM    CHLPNEPCR Not Detected 10/05/2022 11:19 AM    [id number] Not Detected 10/05/2022 11:19 AM    DMAUUX2SFH Not Detected 10/05/2022 11:19 AM    [id number] Not Detected 10/05/2022 11:19 AM    [id number] Not Detected 10/05/2022 11:19 AM    COVID19 Not Detected 01/17/2023 05:23 PM    COVID19 Not Detected 10/05/2022 11:19 AM    METAPNEPCR Not Detected 10/05/2022 11:19 AM    RHENTPCR Not Detected 10/05/2022 11:19 AM    FLUBPCR Not Detected 01/17/2023 05:23 PM    MYCPNEPCR Not Detected 10/05/2022 11:19 AM    CXUHPOV7UKD Not Detected 10/05/2022 11:19 AM    HQLNCHH1PCY Not Detected 10/05/2022 11:19 AM    LEDPBSQ8ZLN Not Detected 10/05/2022 11:19 AM    CPCDZZJ4HPE Not Detected 10/05/2022 11:19 AM    RSVPCR Not Detected 10/05/2022 11:19 AM        Lab Results   Component Value Date/Time    COVID19 Not Detected 01/17/2023 05:23 PM    COVID19 Not Detected 10/05/2022 11:19 AM     COVID-19/SHERITA-COV2 LABS  Recent Labs     01/17/23  1723 01/18/23  0415   PROCAL  --  0.33*   AST 9  --    ALT <5  --      Lab Results   Component Value Date/Time    CHOL 95 05/12/2022 01:44 AM    TRIG 93 05/12/2022 01:44 AM    HDL 38 05/12/2022 01:44 AM    LDLCALC 38 05/12/2022 01:44 AM    LABVLDL 19 05/12/2022 01:44 AM         Lab Results   Component Value Date    HEPAIGM Non-Reactive 04/18/2022    HEPBIGM Non-Reactive 04/18/2022    HCVABI Non-Reactive 04/18/2022     Hep C Ab Interp   Date Value Ref Range Status   04/18/2022 Non-Reactive Non-Reactive Final          MICROBIOLOGY:     Cultures :   Recent Labs     01/17/23  1723   BC 24 Hours no growth     Recent Labs     01/17/23  1723   BLOODCULT2 24 Hours no growth     Recent Labs     01/17/23  2210 01/18/23  0326   ORG Staphylococcus epidermidis* Enterococcus faecium*          WOUND/ABSCESS   Date Value Ref Range Status   01/17/2023 Light growth  Final   07/05/2022 Heavy growth  Final   07/05/2022 Heavy growth  Final Smear, Respiratory   Date Value Ref Range Status   07/06/2022 Refer to ordered Gram stain for results  Final         Component Value Date/Time    AFBCX No growth after 6 weeks of incubation. 10/18/2022 1106    AFBCX No growth after 6 weeks of incubation. 07/08/2022 1546    FUNGSM No Fungal elements seen 07/08/2022 1546    LABFUNG No growth after 4 weeks of incubation.  07/08/2022 1546     CULTURE, RESPIRATORY   Date Value Ref Range Status   07/06/2022 Oral Pharyngeal Shavon absent (A)  Final   07/06/2022 Light growth  Final     Culture Catheter Tip   Date Value Ref Range Status   07/12/2022 Growth not present  Final     Body Fluid Culture, Sterile   Date Value Ref Range Status   08/26/2022 Growth not present  Final     Culture Surgical   Date Value Ref Range Status   02/05/2021 Moderate growth  Final     Creatinine Ur POCT   Date Value Ref Range Status   06/17/2019 50 mg/dl  Final   05/08/2018 50  Final   11/21/2016 50 mg / dL  Final     Comment:     A : C >300 mg / g High Abnormal     Urine Culture, Routine   Date Value Ref Range Status   01/18/2023   Final    >100,000 CFU/ml  Vancomycin resistant Enterococci isolated     10/05/2022 >100,000 CFU/ml  Final   07/05/2022 Growth not present  Final     MRSA Culture Only   Date Value Ref Range Status   01/17/2023 Methicillin resistant Staph aureus not isolated  Final   07/06/2022 Methicillin resistant Staph aureus not isolated  Final          FINAL IMPRESSION    Patient is a 67 y.o. female who presented with   Chief Complaint   Patient presents with    Respiratory Distress     Normally on 3L NC, came in on CPAP, given total of 50mcg push dose epi for low BP by EMS    and admitted for NSTEMI (non-ST elevated myocardial infarction) ([facility] [I21.4]  Atrial fibrillation with rapid ventricular response (HCC) [I48.91]  Recurrent right pleural effusion [J90]  Hypotension, unspecified hypotension type [I95.9]  Acute on chronic respiratory failure with hypoxia and hypercapnia ([facility] [G13.62, J96.22]  VRE UTI   LEFT LE CONS ON WOUND   RIGHT THIGH EDEMA ERYTHEMA WEEPING  WILL BE COVEED WITH LINEZOLID  HYPOTHERMIA CHECK FUNGITELL  right pleural effusion  bibasilar atelectasis. Several locules of gas which appear to be within the endometrium at the level   of the uterine fundus, of uncertain etiology or clinical significance. ASO      linezolid (ZYVOX) tablet 600 mg, 2 times per day  budesonide (PULMICORT) nebulizer suspension 500 mcg, BID  doxycycline (VIBRAMYCIN) 100 mg in sodium chloride 0.9 % 100 mL IVPB (Vpyy2Qkw), Q12H  cefTRIAXone (ROCEPHIN) 1,000 mg in sterile water 10 mL IV syringe, Q24H  miconazole (MICOTIN) 2 % powder, BID     Suggest consulting wound care/ostomy nurse  Continue wound care  Encourage nutritional support  Continue to off load wound/pressure relief bed      Available labs, imaging studies, microbiologic studies have been reviewed. The patient/FAMILY  was educated about the diagnosis, prognosis, indications, risks and benefits of treatment. An opportunity to ask questions was given to the patient/FAMILY and questions were answered. Thank you for involving me in the care of Joseph Collins. Please do not hesitate to call [phone]  for any questions or concerns.          Electronically signed by Marlene Green MD on 1/20/2023 at 9:07 AM

## 2023-01-20 NOTE — FLOWSHEET NOTE
Inpatient Wound Care    Admit Date: 1/17/2023  5:16 PM    Reason for consult:  sin care    Significant history:    Past Medical History:   Diagnosis Date    A-fib ([facility]     Acute on chronic congestive heart failure ([facility]     Anxiety     Asthma     CAD (coronary artery disease) 01/21/2016    Cancer ([facility]  breast ca 2006    right lumpectomy    Chronic kidney disease     nephrolithiasis    COPD exacerbation ([facility] [facility] 10/12/2022    Depression     Diabetes mellitus ([facility]     H/O mammogram     Hx MRSA infection     toe infection january 2012    Hyperlipidemia     Hypertension     Lateral epicondylitis     Morbid obesity (HCC)     SERENA on CPAP     Oxygen dependent     Parkinson's disease ([facility]     Tubal ligation status        Wound history:      Findings:     01/20/23 0950   Skin Integumentary    Skin Integumentary (WDL) X   Skin Integrity Redness   Location right upper thigh   Skin Fold Management   (antifungal powder)   Skin Assessed Underneath Dressing This Shift Yes   Wound 01/17/23 Pretibial Left popped blister   Date First Assessed/Time First Assessed: 01/17/23 2136   Present on Hospital Admission: Yes  Primary Wound Type: (c) Other (comment)  Location: Pretibial  Wound Location Orientation: Left  Wound Description (Comments): popped blister   Wound Image    Wound Cleansed Cleansed with saline   Dressing/Treatment Foam   Wound Length (cm) 4 cm   Wound Width (cm) 5 cm   Wound Surface Area (cm^2) 20 cm^2   Change in Wound Size % (l*w) 11.11   Wound Assessment Pink/red   Drainage Amount Small   Odor None     **Informed Consent**    The patient has given verbal consent to have photos taken of skin and inserted into their chart as part of their permanent medical record for purposes of documentation, treatment management and/or medical review.    All Images taken on 1/20/23 of patient name: Tamiko Hendrix were transmitted and stored on secured [employer] located within [employer] by a registered [employer] Device.       Right thigh is red swollen  Has some drainage no odor    Impression:      Interventions in place:  mepilex to left leg  On low air loss bed  Heel protectors  DSDto right thigh     Plan:CONT LOCAL WOUND CARE   Preventive measure  Needs good skin care  Cleanse folds befor applying powder      Jono Scott RN 1/20/2023 9:54 AM

## 2023-01-20 NOTE — PROGRESS NOTES
Comprehensive Nutrition Assessment    Type and Reason for Visit:  Initial, RD Nutrition Re-Screen/LOS    Nutrition Recommendations/Plan:   Continue current diet order   Recommend Gelatein 20 daily to optimize nutrition status        Malnutrition Assessment:  Malnutrition Status: At risk for malnutrition (Comment) (01/20/23 1432)    Context:  Chronic Illness     Findings of the 6 clinical characteristics of malnutrition:  Energy Intake:  No significant decrease in energy intake  Weight Loss:  Unable to assess     Body Fat Loss:  No significant body fat loss     Muscle Mass Loss:  No significant muscle mass loss    Fluid Accumulation:  Unable to assess     Strength:  Not Performed    Nutrition Assessment:    Pt admit w/ SHANTANU on CKD, Acute on chronic respiratory failure d/t COPD exacerbation and PNA, edema/anasarca chronic, pulmonary HTN. On fluid restriction, IV fluid resuscitation. PMHx of CHF, COPD, CKD, CAD, breast cancer, DM, SERENA, Parkinson's ds. Honey thick liquids, intake 51-75% of meals, will add Gelatein daily to optimize nutrition status    Nutrition Related Findings:    abd soft, non-tender, rotund, hypoactive BS x4, A&Ox4, +1/+2 pitting edema, I/O's +5.8L since admit Wound Type: Wound Consult Pending (R upper thigh redness, L pretibial popped blister)       Current Nutrition Intake & Therapies:    Average Meal Intake: 51-75%  Average Supplements Intake: None Ordered  ADULT DIET; Regular; 3 carb choices (45 gm/meal); Low Fat/Low Chol/High Fiber/2 gm Na; Moderately Thick (Honey); 1800 ml  ADULT ORAL NUTRITION SUPPLEMENT; Dinner; Other Oral Supplement; Gelatein 20    Anthropometric Measures:  Height: 5' (152.4 cm)  Ideal Body Weight (IBW): 100 lbs (45 kg)    Admission Body Weight: 262 lb 2 oz (118.9 kg) (1/17 bed scale)  Current Body Weight: 273 lb 5 oz (124 kg), 273.3 % IBW.  Weight Source: Bed Scale  Current BMI (kg/m2): 53.4  Usual Body Weight:  (JOHN d/t wt fluctuations in EMR)  Weight Adjustment For: No Adjustment  BMI Categories: Obese Class 3 (BMI 40.0 or greater)    Estimated Daily Nutrient Needs:  Energy Requirements Based On: Formula  Weight Used for Energy Requirements: Current  Energy (kcal/day): 1900-2000kcal/day  Weight Used for Protein Requirements: Ideal  Protein (g/day): 115-120g/day (2/5-2.7g/kg IBW)  Method Used for Fluid Requirements: 1 ml/kcal  Fluid (ml/day): per critical care (1800ml fluid restiction)    Nutrition Diagnosis:   Inadequate oral intake related to impaired respiratory function, cardiac dysfunction, renal dysfunction (multiple comorbidities/chronic illnesses) as evidenced by intake 51-75%, poor intake prior to admission    Nutrition Interventions:   Food and/or Nutrient Delivery: Continue Current Diet, Start Oral Nutrition Supplement (Recommend Gelatein 20 daily to optimize nutrition status)  Nutrition Education/Counseling: No recommendation at this time  Coordination of Nutrition Care: Continue to monitor while inpatient    Goals:  Goals: PO intake 75% or greater, by next RD assessment    Nutrition Monitoring and Evaluation:   Behavioral-Environmental Outcomes: None Identified  Food/Nutrient Intake Outcomes: Food and Nutrient Intake, Supplement Intake  Physical Signs/Symptoms Outcomes: Biochemical Data, Chewing or Swallowing, GI Status, Fluid Status or Edema, Hemodynamic Status, Nutrition Focused Physical Findings, Weight, Skin    Discharge Planning:     Too soon to determine     Linda BRAULIO Anders  Contact:

## 2023-01-20 NOTE — PROGRESS NOTES
Physical Therapy  Physical Therapy Treatment Note/Plan of Care    Room #:  IC01/IC01-01  Patient Name: Denver Deist  YOB: 1950  MRN: [id number]    Date of Service: 1/20/2023     Tentative placement recommendation: Subacute Rehab  Equipment recommendation:  To be determined      Evaluating Physical Therapist: Cristian Santacruz, PT, DPT #[id number]      Specific Provider Orders/Date/Referring Provider :     01/19/23 0745    PT eval and treat  Start:  01/19/23 0745,   End:  01/19/23 0745,   ONE TIME,   Standing Count:  1 Occurrences,   Ean Chaudhari MD Acknowledge New     Admitting Diagnosis:   NSTEMI (non-ST elevated myocardial infarction) [facility]) [I21.4]  Atrial fibrillation with rapid ventricular response (HCC) [I48.91]  Recurrent right pleural effusion [J90]  Hypotension, unspecified hypotension type [I95.9]  Acute on chronic respiratory failure with hypoxia and hypercapnia (HCC) [J96.21, J96.22]      Surgery: none  Visit Diagnoses         Codes    Recurrent right pleural effusion     J90    NSTEMI (non-ST elevated myocardial infarction) ([facility] [address] [address]     I21.4            Patient Active Problem List   Diagnosis    Depression with anxiety    Osteoporosis    Asthma    Hyperlipidemia    Mitral regurgitation    Obstructive sleep apnea syndrome    Psoriasis    Diabetes mellitus ([address]    Parkinson's disease ([address]    Primary hypertension    Microalbuminuria    Morbid obesity (HCC)    RLS (restless legs syndrome)    Generalized weakness    Inability to walk    Hypothyroidism    Chest pain    Acute asthma exacerbation    Asthma exacerbation, mild    Paroxysmal atrial fibrillation (HCC)    Atrial fibrillation with rapid ventricular response (HCC)    Acute on chronic congestive heart failure ([address]    Coronary artery disease involving native coronary artery of native heart without angina pectoris    Dysphagia    Hepatosplenomegaly    Acute decompensated heart failure (HCC)    Acute diastolic (congestive) heart failure (HCC)    Nonrheumatic tricuspid valve regurgitation    Tobacco abuse    Recurrent syncope    Septic shock (HCC)    Seizure-like activity (HCC)    Acute kidney injury ([facility]    Pancytopenia (HCC)    Thrombocytopenia (HCC)    Encephalopathy    Acute respiratory failure with hypoxia (HCC)    Lactic acidosis    Delirium    Acute on chronic anemia    Pleural effusion, right    Acute respiratory failure with hypoxia and hypercapnia (HCC)    Acute confusion    Acute on chronic diastolic heart failure (HCC)    Macrocytosis    Other specified anemias    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[facility]    Puerperal sepsis with acute hypoxic respiratory failure without septic shock (HCC)    Pulmonary HTN (HCC)    Chronic anticoagulation    RVF (right ventricular failure) (HCC)    Metabolic alkalosis    Sepsis (HCC)    COPD exacerbation (HCC)    Acute on chronic respiratory failure with hypercapnia (HCC)    Persistent atrial fibrillation (HCC)    Hypercapnic respiratory failure (HCC)    Acute on chronic respiratory failure (HCC)    Hyperkalemia    Heart failure (HCC)    Acute renal insufficiency    Hypotension    Anemia    Acute on chronic respiratory failure with hypoxia and hypercapnia (HCC)        ASSESSMENT of Current Deficits Patient exhibits decreased strength, balance, and endurance impairing functional mobility, transfers, gait , gait distance, and tolerance to activity. Pt required MaxA for supine<>sit and Moderate Assist to sit EOB for 6 minutes. Pt needing moderate assist for sitting balance due to posterior/right lateral lean throughout sitting up. Pt fatigues quickly and requested to lay back down. Pt with AAROM/AROM with all exercises.       PHYSICAL THERAPY  PLAN OF CARE       Physical therapy plan of care is established based on physician order,  patient diagnosis and clinical assessment    Current Treatment Recommendations:    -Bed Mobility: Lower extremity exercises  and Trunk control activities   -Sitting Balance: Incorporate reaching activities to activate trunk muscles , Hands on support to maintain midline , Facilitate active trunk muscle engagement , Facilitate postural control in all planes , and Engage in core activities to allow for movement within base of support   -Standing Balance: Perform strengthening exercises in standing to promote motor control with or without upper extremity support , Instruct patient on adequate base of support to maintain balance, and Challenge balance utilizing reaching  activities beyond center of gravity    -Transfers: Provide instruction on proper hand and foot position for adequate transfer of weight onto lower extremities and use of gait device if needed, Cues for hand placement, technique and safety. Provide stabilization to prevent fall , Facilitate weight shift forward on to lower extremities and provide necessary stabilization of bilateral lower extremities , Support transfer of weight on to lower extremities, and Assist with extension of knees trunk and hip to accept weight transfer   -Gait: Gait training, Standing activities to improve: base of support, weight shift, weight bearing , Exercises to improve trunk control, Exercises to improve hip and knee control, Performance of protected weight bearing activities, and Activities to increase weight bearing   -Endurance: Utilize Supervised activities to increase level of endurance to allow for safe functional mobility including transfers and gait  and Use graduated activities to promote good breathing techniques and provide support and education to maximize respiratory function    PT long term treatment goals are located in below grid    Patient and or family understand(s) diagnosis, prognosis, and plan of care. Frequency of treatments: Patient will be seen  3-5 times/week.          Prior Level of Function: Patient ambulated with wheeled walker for short distances  Rehab Potential: fair + for baseline    Past medical history:   Past Medical History:   Diagnosis Date    A-fib ([facility]     Acute on chronic congestive heart failure (HCC)     Anxiety     Asthma     CAD (coronary artery disease) 01/21/2016    Cancer ([facility]  breast ca 2006    right lumpectomy    Chronic kidney disease     nephrolithiasis    COPD exacerbation ([facility] 10/12/2022    Depression     Diabetes mellitus ([facility]     H/O mammogram     Hx MRSA infection     toe infection january 2012    Hyperlipidemia     Hypertension     Lateral epicondylitis     Morbid obesity (HCC)     SERENA on CPAP     Oxygen dependent     Parkinson's disease ([facility]     Tubal ligation status      Past Surgical History:   Procedure Laterality Date    BREAST LUMPECTOMY      BREAST REDUCTION SURGERY      CARDIAC CATHETERIZATION  4/28/2014    Dr. Montserrat Lunsford  01/11/2022    Dr. Kyle Curtis  7/29/15    CT GUIDED CHEST TUBE  7/8/2022    CT GUIDED CHEST TUBE 7/8/2022 Nery Gómez MD [facility]    ENDOSCOPY, COLON, DIAGNOSTIC  7/19/15    GALLBLADDER SURGERY      LUMBAR LAMINECTOMY      TOE AMPUTATION      TONSILLECTOMY      UPPER GASTROINTESTINAL ENDOSCOPY      UPPER GASTROINTESTINAL ENDOSCOPY N/A 7/19/2022    EGD ESOPHAGOGASTRODUODENOSCOPY performed by Makayla Harp MD at [address]:    Precautions:  Up with assistance, falls, O2, and morbid obesity      Social history: Patient from SNF    Equipment owned: Wheelchair, Wheeled Walker, and [address] [place] [address] 14 Mobility Inpatient   How much difficulty turning over in bed?: A Lot  How much difficulty sitting down on / standing up from a chair with arms?: Unable  How much difficulty moving from lying on back to sitting on side of bed?: A Lot  How much help from another person moving to and from a bed to a chair?: Total  How much help from another person needed to walk in hospital room?: Total  How much help from another person for climbing 3-5 steps with a railing?: Total  AM-PAC Inpatient Mobility Raw Score : 8  AM-PAC Inpatient T-Scale Score : 28.52  Mobility Inpatient CMS 0-100% Score: 86.62  Mobility Inpatient CMS G-Code Modifier : CM    Nursing cleared patient for PT treatment. OBJECTIVE;   Initial Evaluation  Date: 1/19/2023 Treatment Date:  1/20/2023       Short Term/ Long Term   Goals   Was pt agreeable to Eval/treatment? Yes yes To be met in 3 days   Pain level   0/10   7/10  Right leg    Bed Mobility    Rolling: Maximal assist of 1    Supine to sit: Maximal assist of 1    Sit to supine: Maximal assist of 1    Scooting: Maximal assist of 1   Rolling: Maximal assist of 1   Supine to sit: Maximal assist of 1   Sit to supine: Maximal assist of 1   Scooting: Maximal assist of 1    Rolling: Minimal assist of 1    Supine to sit: Minimal assist of 1    Sit to supine: Minimal assist of 1    Scooting: Minimal assist of 1     Transfers Sit to stand: Not assessed  d/t fair- seated balance and pt declining standing Sit to stand: Not assessed   due to patient's overall debility, decreased activity tolerance, balance deficits, safety and fall risk. Sit to stand:  Moderate assist of 1    Ambulation    not assessed     > 15 feet using  wheeled walker with Moderate assist of 1    Stair negotiation: ascended and descended   Not assessed           ROM Within functional limits    Increase range of motion 10% of affected joints    Strength BUE:  refer to OT eval  RLE:  4-/5  LLE:  4-/5  Increase strength in affected mm groups by 1/3 grade   Balance Sitting EOB:  fair -  Dynamic Standing:  not assessed  Sitting EOB: poor + due to posterior/right lateral lean  Dynamic Standing: not assessed    Sitting EOB:  fair   Dynamic Standing: fair - with [facility]     Patient is Alert & Oriented x person, place, time, and situation and follows directions    Sensation:  Patient  denies numbness/tingling   Edema:  no   Endurance: poor +    Vitals:  4 liters nasal cannula   Blood Pressure at rest Blood Pressure during session    Heart Rate at rest  Heart Rate during session    SPO2 at rest 90%  SPO2 during session 87% on 4L; increased to 8L 89 -90%     Patient education  Patient educated on role of Physical Therapy, risks of immobility, safety and plan of care, energy conservation,  importance of mobility while in hospital , ankle pumps, quad set and glut set for edema control, blood clot prevention, importance and purpose of adaptive device and adjusted to proper height for the patient. , and safety      Patient response to education:   Pt verbalized understanding Pt demonstrated skill Pt requires further education in this area   Yes Partial Yes      Treatment:  Patient practiced and was instructed/facilitated in the following treatment: Patient performed supine exercises. Pt assisted to edge of bed,  Sat edge of bed 6 minutes with Moderate assist of 1 to increase dynamic sitting balance and activity tolerance. Pt working on sitting balance due to posterior/right lateral lean. Pt fatigues quickly and requested to lay back down. Therapeutic Exercises:  ankle pumps, quad sets, glut sets, heel slide, hip abduction/adduction, and straight leg raise, x 15 reps. At end of session, patient in bed with alarm call light and phone within reach,  all lines and tubes intact, nursing notified. Patient would benefit from continued skilled Physical Therapy to improve functional independence and quality of life. Patient's/ family goals   rehab    Time in 08:43  Time out 09:07    Total Treatment Time  24 minutes      CPT codes:    Therapeutic activities (31676)   12 minutes  1 unit(s)  Therapeutic exercises (52951)   12 minutes  1 unit(s)    Maynor Manjarrez  [facility]  LIC # [id number]

## 2023-01-21 LAB
METER GLUCOSE: 138 MG/DL (ref 74–99)
METER GLUCOSE: 156 MG/DL (ref 74–99)
METER GLUCOSE: 177 MG/DL (ref 74–99)
METER GLUCOSE: 181 MG/DL (ref 74–99)

## 2023-01-21 PROCEDURE — 6370000000 HC RX 637 (ALT 250 FOR IP): Performed by: NURSE PRACTITIONER

## 2023-01-21 PROCEDURE — 2500000003 HC RX 250 WO HCPCS: Performed by: INTERNAL MEDICINE

## 2023-01-21 PROCEDURE — 6360000002 HC RX W HCPCS: Performed by: NURSE PRACTITIONER

## 2023-01-21 PROCEDURE — 6370000000 HC RX 637 (ALT 250 FOR IP): Performed by: INTERNAL MEDICINE

## 2023-01-21 PROCEDURE — 2060000000 HC ICU INTERMEDIATE R&B

## 2023-01-21 PROCEDURE — 94660 CPAP INITIATION&MGMT: CPT

## 2023-01-21 PROCEDURE — 2580000003 HC RX 258: Performed by: NURSE PRACTITIONER

## 2023-01-21 PROCEDURE — 2580000003 HC RX 258: Performed by: INTERNAL MEDICINE

## 2023-01-21 PROCEDURE — 82962 GLUCOSE BLOOD TEST: CPT

## 2023-01-21 PROCEDURE — 2700000000 HC OXYGEN THERAPY PER DAY

## 2023-01-21 PROCEDURE — 99232 SBSQ HOSP IP/OBS MODERATE 35: CPT | Performed by: INTERNAL MEDICINE

## 2023-01-21 PROCEDURE — 86060 ANTISTREPTOLYSIN O TITER: CPT

## 2023-01-21 PROCEDURE — 94640 AIRWAY INHALATION TREATMENT: CPT

## 2023-01-21 RX ORDER — BUMETANIDE 0.25 MG/ML
2 INJECTION, SOLUTION INTRAMUSCULAR; INTRAVENOUS ONCE
Status: COMPLETED | OUTPATIENT
Start: 2023-01-21 | End: 2023-01-21

## 2023-01-21 RX ADMIN — MONTELUKAST SODIUM 10 MG: 10 TABLET, FILM COATED ORAL at 22:30

## 2023-01-21 RX ADMIN — CARBIDOPA AND LEVODOPA 1 TABLET: 50; 200 TABLET, EXTENDED RELEASE ORAL at 22:35

## 2023-01-21 RX ADMIN — IPRATROPIUM BROMIDE AND ALBUTEROL SULFATE 3 ML: .5; 2.5 SOLUTION RESPIRATORY (INHALATION) at 09:31

## 2023-01-21 RX ADMIN — INSULIN GLARGINE 13 UNITS: 100 INJECTION, SOLUTION SUBCUTANEOUS at 09:00

## 2023-01-21 RX ADMIN — SUCRALFATE 1 G: 1 TABLET ORAL at 10:02

## 2023-01-21 RX ADMIN — SUCRALFATE 1 G: 1 TABLET ORAL at 18:03

## 2023-01-21 RX ADMIN — SODIUM CHLORIDE: 9 INJECTION, SOLUTION INTRAVENOUS at 05:47

## 2023-01-21 RX ADMIN — BUDESONIDE 500 MCG: 0.5 SUSPENSION RESPIRATORY (INHALATION) at 06:14

## 2023-01-21 RX ADMIN — SERTRALINE 50 MG: 50 TABLET, FILM COATED ORAL at 10:02

## 2023-01-21 RX ADMIN — ROPINIROLE HYDROCHLORIDE 1 MG: 1 TABLET, FILM COATED ORAL at 10:02

## 2023-01-21 RX ADMIN — ROPINIROLE HYDROCHLORIDE 1 MG: 1 TABLET, FILM COATED ORAL at 14:28

## 2023-01-21 RX ADMIN — SUCRALFATE 1 G: 1 TABLET ORAL at 14:28

## 2023-01-21 RX ADMIN — BUDESONIDE 500 MCG: 0.5 SUSPENSION RESPIRATORY (INHALATION) at 18:15

## 2023-01-21 RX ADMIN — ASPIRIN 81 MG: 81 TABLET, COATED ORAL at 10:02

## 2023-01-21 RX ADMIN — LINEZOLID 600 MG: 600 TABLET, FILM COATED ORAL at 10:02

## 2023-01-21 RX ADMIN — CARBIDOPA AND LEVODOPA 1 TABLET: 50; 200 TABLET, EXTENDED RELEASE ORAL at 14:28

## 2023-01-21 RX ADMIN — APIXABAN 5 MG: 5 TABLET, FILM COATED ORAL at 10:02

## 2023-01-21 RX ADMIN — ANTI-FUNGAL POWDER MICONAZOLE NITRATE TALC FREE: 1.42 POWDER TOPICAL at 22:36

## 2023-01-21 RX ADMIN — BUMETANIDE 2 MG: 0.25 INJECTION, SOLUTION INTRAMUSCULAR; INTRAVENOUS at 14:28

## 2023-01-21 RX ADMIN — METOPROLOL SUCCINATE 25 MG: 25 TABLET, FILM COATED, EXTENDED RELEASE ORAL at 22:35

## 2023-01-21 RX ADMIN — IPRATROPIUM BROMIDE AND ALBUTEROL SULFATE 3 ML: .5; 2.5 SOLUTION RESPIRATORY (INHALATION) at 18:15

## 2023-01-21 RX ADMIN — PANTOPRAZOLE SODIUM 40 MG: 40 TABLET, DELAYED RELEASE ORAL at 06:24

## 2023-01-21 RX ADMIN — ARFORMOTEROL TARTRATE 15 MCG: 15 SOLUTION RESPIRATORY (INHALATION) at 18:14

## 2023-01-21 RX ADMIN — Medication 10 ML: at 22:29

## 2023-01-21 RX ADMIN — ROPINIROLE HYDROCHLORIDE 1 MG: 1 TABLET, FILM COATED ORAL at 22:30

## 2023-01-21 RX ADMIN — SUCRALFATE 1 G: 1 TABLET ORAL at 22:29

## 2023-01-21 RX ADMIN — DOXYCYCLINE 100 MG: 100 INJECTION, POWDER, LYOPHILIZED, FOR SOLUTION INTRAVENOUS at 05:52

## 2023-01-21 RX ADMIN — MIRTAZAPINE 7.5 MG: 15 TABLET, FILM COATED ORAL at 22:33

## 2023-01-21 RX ADMIN — LINEZOLID 600 MG: 600 TABLET, FILM COATED ORAL at 22:36

## 2023-01-21 RX ADMIN — METOPROLOL SUCCINATE 25 MG: 25 TABLET, FILM COATED, EXTENDED RELEASE ORAL at 10:02

## 2023-01-21 RX ADMIN — CEFTRIAXONE 1000 MG: 1 INJECTION, POWDER, FOR SOLUTION INTRAMUSCULAR; INTRAVENOUS at 22:47

## 2023-01-21 RX ADMIN — IPRATROPIUM BROMIDE AND ALBUTEROL SULFATE 3 ML: .5; 2.5 SOLUTION RESPIRATORY (INHALATION) at 14:31

## 2023-01-21 RX ADMIN — IPRATROPIUM BROMIDE AND ALBUTEROL SULFATE 3 ML: .5; 2.5 SOLUTION RESPIRATORY (INHALATION) at 06:14

## 2023-01-21 RX ADMIN — ANTI-FUNGAL POWDER MICONAZOLE NITRATE TALC FREE: 1.42 POWDER TOPICAL at 10:04

## 2023-01-21 RX ADMIN — DOXYCYCLINE 100 MG: 100 INJECTION, POWDER, LYOPHILIZED, FOR SOLUTION INTRAVENOUS at 18:03

## 2023-01-21 RX ADMIN — ATORVASTATIN CALCIUM 20 MG: 20 TABLET, FILM COATED ORAL at 22:30

## 2023-01-21 RX ADMIN — APIXABAN 5 MG: 5 TABLET, FILM COATED ORAL at 22:32

## 2023-01-21 RX ADMIN — INSULIN GLARGINE 13 UNITS: 100 INJECTION, SOLUTION SUBCUTANEOUS at 22:48

## 2023-01-21 RX ADMIN — ARFORMOTEROL TARTRATE 15 MCG: 15 SOLUTION RESPIRATORY (INHALATION) at 06:14

## 2023-01-21 ASSESSMENT — PAIN SCALES - GENERAL: PAINLEVEL_OUTOF10: 0

## 2023-01-21 NOTE — PROGRESS NOTES
Admitting Date and Time: 1/17/2023  5:16 PM  Admit Dx: NSTEMI (non-ST elevated myocardial infarction) ([facility] [address] [I21.4]  Atrial fibrillation with rapid ventricular response (HCC) [I48.91]  Recurrent right pleural effusion [J90]  Hypotension, unspecified hypotension type [I95.9]  Acute on chronic respiratory failure with hypoxia and hypercapnia (HCC) [J96.21, J96.22]    Subjective  She says less sob but still mild sob.   Was cold now warm in am  Per RN: luis    ROS: denies fever, chills, cp, sob, n/v, HA unless stated above.     linezolid  600 mg Oral 2 times per day    [Held by provider] dilTIAZem  60 mg Oral 3 times per day    sucralfate  1 g Oral 4x Daily    rOPINIRole  1 mg Oral TID    atorvastatin  20 mg Oral Nightly    ipratropium-albuterol  1 vial Inhalation 4x Daily    insulin glargine  13 Units SubCUTAneous BID    [Held by provider] LORazepam  0.5 mg Oral Nightly    [Held by provider] bumetanide  2 mg Oral Daily    metoprolol succinate  25 mg Oral BID    insulin lispro  0-4 Units SubCUTAneous TID WC    insulin lispro  0-4 Units SubCUTAneous Nightly    budesonide  0.5 mg Nebulization BID    doxycycline (VIBRAMYCIN) IV  100 mg IntraVENous Q12H    cefTRIAXone (ROCEPHIN) IV  1,000 mg IntraVENous Q24H    sodium chloride flush  10 mL IntraVENous 2 times per day    miconazole   Topical BID    arformoterol tartrate  15 mcg Nebulization BID    pantoprazole  40 mg Oral QAM AC    apixaban  5 mg Oral BID    mirtazapine  7.5 mg Oral Nightly    aspirin  81 mg Oral Daily    carbidopa-levodopa  1 tablet Oral TID    sertraline  50 mg Oral Daily    montelukast  10 mg Oral Nightly    [Held by provider] midodrine  5 mg Oral TID WC     loperamide, 2 mg, 4x Daily PRN  sodium chloride flush, 10 mL, PRN  sodium chloride, , PRN  promethazine, 12.5 mg, Q6H PRN   Or  ondansetron, 4 mg, Q6H PRN  polyethylene glycol, 17 g, Daily PRN  acetaminophen, 650 mg, Q6H PRN   Or  acetaminophen, 650 mg, Q6H PRN  glucose, 4 tablet, PRN  dextrose bolus, 125 mL, PRN   Or  dextrose bolus, 250 mL, PRN  glucagon (rDNA), 1 mg, PRN  dextrose, , Continuous PRN       Objective:    BP (!) 142/87   Pulse 86   Temp 97.3 °F (36.3 °C) (Axillary)   Resp 24   Ht 5' (1.524 m)   Wt 273 lb 5 oz (124 kg)   SpO2 93%   BMI 53.38 kg/m²   General Appearance: alert and oriented to person, place and time and in no acute distress  Skin: warm and dry  Head: normocephalic and atraumatic  Eyes: pupils equal, round, and reactive to light, extraocular eye movements intact, conjunctivae normal  Neck: neck supple and non tender without mass   Pulmonary/Chest: clear to auscultation bilaterally- no wheezes, rales or rhonchi, normal air movement, no respiratory distress  Cardiovascular: normal rate, normal S1 and S2 and no carotid bruits  Abdomen: soft, non-tender, non-distended, normal bowel sounds, no masses or organomegaly  Extremities: no cyanosis, no clubbing and no  edema  Neurologic: no cranial nerve deficit and speech normal      Recent Labs     01/19/23  0427 01/20/23  0507    141   K 4.9 4.8   CL 97* 99   CO2 33* 33*   BUN 54* 55*   CREATININE 2.0* 2.1*   GLUCOSE 235* 170*   CALCIUM 8.5* 8.3*         No results for input(s): ALKPHOS, PROT, LABALBU, BILITOT, AST, ALT in the last 72 hours. Recent Labs     01/19/23  0427 01/20/23  0507   WBC 10.6 8.9   RBC 2.91* 3.17*   HGB 7.7* 8.0*   HCT 26.5* 30.1*   MCV 91.1 95.0   MCH 26.5 25.2*   MCHC 29.1* 26.6*   RDW 16.3* 16.4*    231   MPV 10.4 10.5             Radiology:   CT ABDOMEN PELVIS WO CONTRAST Additional Contrast? None   Final Result   No nephroureterolithiasis or hydronephrosis. Splenomegaly. Small volume of perihepatic ascites. At least moderate-sized partially imaged right pleural effusion with   bibasilar atelectasis. Several locules of gas which appear to be within the endometrium at the level   of the uterine fundus, of uncertain etiology or clinical significance.    Please correlate clinically. Diffuse anasarca throughout the soft tissues. Cardiomegaly. XR CHEST PORTABLE   Final Result   Stable right pleural effusion. Assessment:  Principal Problem:    Acute on chronic respiratory failure with hypoxia and hypercapnia (HCC)  Resolved Problems:    * No resolved hospital problems. *      Plan: History of present illness from History and Physical:  This is a 67 y.o. female  has a past medical history of A-fib ([facility] [address] Acute on chronic congestive heart failure ([facility] [address] Anxiety, Asthma, CAD (coronary artery disease), Cancer ([facility] [address] Chronic kidney disease, COPD exacerbation ([facility] [address] Depression, Diabetes mellitus ([facility] [address] H/O mammogram, Hx MRSA infection, Hyperlipidemia, Hypertension, Lateral epicondylitis, Morbid obesity ([facility] [address] SERENA on CPAP, Oxygen dependent, Parkinson's disease ([facility] [address] and Tubal ligation status. presented with SOB, hypotension  for last few days prior to arrival to the hospital. SOB is associated with some cough, nonproductive but no fever or chills reported. Initially SOB was mild, intermittent but gradually getting more persistent. Started gradually. Exacerbated by general activity or exertion. Relieved only partially by rest. In ED patient was found to be hypotensive with Afib RVR and respiratory failure and was placed on BIPAP. BP improved after midodrine dose. The patient was seen and examined at bedside, appears alert and awake with no acute distress and is able to answer simple  questions. On direct questioning, patient denied any  resting ongoing chest pain, hemoptysis, productive cough, fever, ongoing palpitation, active abdominal pain, hematemesis, rectal bleeding, blessing, hematuria, any other  and GI complaints, and any new focal neuro deficits     Acute hypercapnic respiratory failure contributed to by: underlying copd (not in exacerbation)   SERENA noncompliant with CPAP & Obesity induced hypoventilation syndrome  No pna on image; bipap today.  O2 today stayed at 4 liters with minor adjustments  o2, nebs    P A fib eliquis no other dvt prophy needed, rate control  Then RVR in pm of 1/18 so ccb gtts 1/19 switched to po then held on 1/20. Gave bb  Uti: >100,000 CFU/ml   Vancomycin resistant Enterococci isolated so change Ceftriaxone to linezolid  Mandeep: ivf reorder daily monitoring  Dm: diabetic diet, ssc  Parkinson l dopa  Full code  Dispo: pt, consider place    NOTE: This report was transcribed using voice recognition software. Every effort was made to ensure accuracy; however, inadvertent computerized transcription errors may be present.      Electronically signed by Sahara Perdue MD on 1/21/2023 at 4:42 PM

## 2023-01-21 NOTE — PROGRESS NOTES
Physician Progress Note      PATIENT:               Marian Almazan  CSN #:                  [id number]  :                       1950  ADMIT DATE:       2023 5:16 PM  [address] [place] DATE:  Cleta Schwab  PROVIDER #:        Edvin Landry MD          QUERY TEXT:    Patient admitted with elevated troponin. Noted documentation of NSTEMI in H&P,   and NSTEMI-likely type II due to supply/demand mismatch by Critical care   consultant. If possible, please document in the progress notes and discharge   summary if you are evaluating and/or treating any of the following: The medical record reflects the following:  Risk Factors:A/C resp failure, AE Copd, obesity hypoventilation syndrome, SHANTANU   on ckd, DM, CHF, pulmonary hypertension  Clinical Indicators: HS trop: 51--114-93, Denies chest pain,  EKG:   AF, Septal infarct (cited on or before 22,   EKG: AF rvr, When   compared with ECG of 23 Nonspecific T wave abnormality, improved in   Anterior leads  ECHO: mild LVH, EF 65%, Indeterminate diastolic function. RV global systolic   function mildly reduced. Physiologic and/or trace mitral regurgitation is   present. moderate tricuspid regurgitation. Pulmonary hypertension is mild to   moderate  Treatment: EC Aspirin, Eliquis, Toprol XL,  Cardizem, Bumex. Thank you, Lyric Patel RN [facility] [phone]  Options provided:  -- NSTEMI  -- Type 2 MI  -- Demand Ischemia with MI  -- Demand Ischemia only, no MI  -- Non-ischemic myocardial injury due to CHF, CKD, Respiratory failure  -- Non-ischemic myocardial injury due to other, Please specify cause. -- Elevated troponin without myocardial injury  -- Other - I will add my own diagnosis  -- Disagree - Not applicable / Not valid  -- Disagree - Clinically unable to determine / Unknown  -- Refer to Clinical Documentation Reviewer    PROVIDER RESPONSE TEXT:    This patient has an NSTEMI.     Query created by: David Thomas on 2023 10:38 PM      QUERY TEXT:    Pt admitted with AE Copd, Pneumonia, UTI. Noted documentation of possible   sepsis in H&P, Nephro consult note. Pt noted to have elevated Procalcitonin,   HR, decreased temp, BP. If possible, please document in the progress notes and   discharge summary if you are evaluating and /or treating any of the   following: The medical record reflects the following:  Risk Factors: PNA, UTI, SHANTANU, A/C respiratory failure, right thigh wound,   morbid obesity, DM, poss NSTEMI  Clinical Indicators: Procal 0.33, BP 97/56, 85/50, HR 95, temp 96.3F, UC >   100,000 E faecium,  WC S epidermidis,  1/16 CXR: Stable right-sided pleural effusion with adjacent atelectasis and or   infiltrate. Treatment: NS bolus 1.5L, Decadron, Rocephin, Vibramycin, Zyvox, Pulmicort,   Nebs, Critical care, Nephrology, ID consults. Thank you, Liz Mina RN [phone]  Options provided:  -- Sepsis, present on admission  -- Sepsis, developed following admission  -- Pneumonia, UTI without Sepsis  -- Sepsis was ruled out  -- Other - I will add my own diagnosis  -- Disagree - Not applicable / Not valid  -- Disagree - Clinically unable to determine / Unknown  -- Refer to Clinical Documentation Reviewer    PROVIDER RESPONSE TEXT:    This patient has sepsis which was present on admission. Query created by: Sathish Abrams on 1/20/2023 10:40 PM      Electronically signed by:   Terrence Son MD 1/21/2023 7:39 AM

## 2023-01-21 NOTE — PROGRESS NOTES
Pulmonary/Critical Care Consult Note    CHIEF COMPLAINT: Shortness of breath and hypotension      IMPRESSION/ PLAN:    Acute on chronic hypoxic and hypercapnic respiratory failure  COPD   Obesity induced hypoventilation syndrome  SERENA noncompliant with CPAP  BiPAP qhs    DuoNeb and Brovana    Stable overall  Likely at baseline (although states SOB today)      CHF - Diastolic  Pleural effusions  Very elevated pro-BNP    Hypotension  History of   Improved / resolved    UTI with VRE  On Linezolid PO  ID consulted    Severe pulmonary hypertension  Secondary to above      SHANTANU on CKD  Hyperkalemia  Elevated K+ resolved  Close to baseline  Nephrology consultation appreciated   Follow     Chronic atrial fibrillation   Rate controlled  On metoprolol / dilt  Eliquis      Type 2 diabetes   Insulin sliding scale      Anemia  Transfuse for Hgb  < 7    Intertrigo  Wound culture to left lower leg  Miconazole powder to rash on lower abdominal folds twice daily      Overall stable     Discharge planning  PT/OT  __________________________________________    Subjective  Feeling better  States at baseline    Events last 24 hr  AF rate controlled  BP OK      HISTORY OF PRESENT ILLNESS: Patient is a 78-year-old female with history of COPD, CAD, breast cancer, CKD, hypertension, hyperlipidemia, diabetes mellitus, obstructive sleep apnea noncompliant with CPAP, oxygen dependence, and Parkinson's disease. Patient presented to the ED on 1/17/2023 for increased shortness of breath over the past 2 days. Patient placed on BiPAP on presentation to the ED and initial ABG was likely mixed or venous. Repeat ABG on AVAPS showed a pH of 7.257, PCO2 90, PO2 183.4, HCO3 39.2, and SPO2 98.6%. The patient also received 1 L normal saline bolus and midodrine 10 mg p.o. x1 for hypotension which the patient takes at the nursing home 3 times daily. The patient did respond well to the IV fluids and does not require vasopressor support at this time.   Lactic acid normal at 1.7. According to the patient's family she is supposed to wear CPAP at night but is noncompliant. She uses supplemental oxygen as needed. Portable chest x-ray shows stable right pleural effusion and no other acute cardiopulmonary process.     ALLERGY:  Ciprofloxacin, Codeine, and Digoxin and related            MEDICAL HISTORY:  Past Medical History:   Diagnosis Date    A-fib ([facility]     Acute on chronic congestive heart failure (HCC)     Anxiety     Asthma     CAD (coronary artery disease) 01/21/2016    Cancer ([facility]  breast ca 2006    right lumpectomy    Chronic kidney disease     nephrolithiasis    COPD exacerbation ([facility] 10/12/2022    Depression     Diabetes mellitus ([facility]     H/O mammogram     Hx MRSA infection     toe infection january 2012    Hyperlipidemia     Hypertension     Lateral epicondylitis     Morbid obesity (HCC)     SERENA on CPAP     Oxygen dependent     Parkinson's disease ([facility]     Tubal ligation status        MEDICATIONS:   bumetanide  2 mg IntraVENous Once    linezolid  600 mg Oral 2 times per day    [Held by provider] dilTIAZem  60 mg Oral 3 times per day    sucralfate  1 g Oral 4x Daily    rOPINIRole  1 mg Oral TID    atorvastatin  20 mg Oral Nightly    ipratropium-albuterol  1 vial Inhalation 4x Daily    insulin glargine  13 Units SubCUTAneous BID    [Held by provider] LORazepam  0.5 mg Oral Nightly    [Held by provider] bumetanide  2 mg Oral Daily    metoprolol succinate  25 mg Oral BID    insulin lispro  0-4 Units SubCUTAneous TID WC    insulin lispro  0-4 Units SubCUTAneous Nightly    budesonide  0.5 mg Nebulization BID    doxycycline (VIBRAMYCIN) IV  100 mg IntraVENous Q12H    cefTRIAXone (ROCEPHIN) IV  1,000 mg IntraVENous Q24H    sodium chloride flush  10 mL IntraVENous 2 times per day    miconazole   Topical BID    arformoterol tartrate  15 mcg Nebulization BID    pantoprazole  40 mg Oral QAM AC    apixaban  5 mg Oral BID    mirtazapine  7.5 mg Oral Nightly    aspirin  81 mg Oral Daily    carbidopa-levodopa  1 tablet Oral TID    sertraline  50 mg Oral Daily    montelukast  10 mg Oral Nightly    [Held by provider] midodrine  5 mg Oral TID WC      sodium chloride      dextrose           PHYSICAL EXAM:  Vitals:    01/21/23 1145   BP:    Pulse:    Resp:    Temp: 97.3 °F (36.3 °C)   SpO2:      FiO2 : 40 %  O2 Flow Rate (L/min): 4 L/min  O2 Device: Nasal cannula    Constitutional: No fever, chills, diaphoresis  HEENT: No head lesions, PERRL, EOMI, mouth without lesions, no nasal lesions, no cervical adenopathy palpated   Respiratory: Lungs with equal breath sounds bilaterally diminished with no  wheezes, no accessory muscle use   CV: Rapid rate and irregular rhythm, no murmurs, JVD, bilateral leg edema  Abdomen: Soft, obese, non tender, + bowel sounds, no lesions   Skin: Adequate turgor, no rash, capillary refill <2 seconds, rash noted to bilateral lower abdominal folds, wound noted to left anterior shin  Extremities: Muscular strength 4/4 in 4 limbs, moves 4 limbs spontaneously, distal pulses present   Neurology: Awake and alert, follows commands, moves 4 limbs on command and spontaneously, equal sensation, no dysmetria, neck is supple, no meningitic signs present.       LABS:  WBC   Date Value Ref Range Status   01/20/2023 8.9 4.5 - 11.5 E9/L Final   01/19/2023 10.6 4.5 - 11.5 E9/L Final   01/18/2023 9.9 4.5 - 11.5 E9/L Final     Hemoglobin   Date Value Ref Range Status   01/20/2023 8.0 (L) 11.5 - 15.5 g/dL Final   01/19/2023 7.7 (L) 11.5 - 15.5 g/dL Final   01/18/2023 7.3 (L) 11.5 - 15.5 g/dL Final     Hematocrit   Date Value Ref Range Status   01/20/2023 30.1 (L) 34.0 - 48.0 % Final   01/19/2023 26.5 (L) 34.0 - 48.0 % Final   01/18/2023 26.5 (L) 34.0 - 48.0 % Final     MCV   Date Value Ref Range Status   01/20/2023 95.0 80.0 - 99.9 fL Final   01/19/2023 91.1 80.0 - 99.9 fL Final   01/18/2023 93.3 80.0 - 99.9 fL Final     Platelets   Date Value Ref Range Status   01/20/2023 231 130 - 668 E9/L Final   01/19/2023 210 130 - 450 E9/L Final   01/18/2023 174 130 - 450 E9/L Final     Sodium   Date Value Ref Range Status   01/20/2023 141 132 - 146 mmol/L Final   01/19/2023 141 132 - 146 mmol/L Final   01/18/2023 141 132 - 146 mmol/L Final     Potassium reflex Magnesium   Date Value Ref Range Status   01/20/2023 4.8 3.5 - 5.0 mmol/L Final   01/19/2023 4.9 3.5 - 5.0 mmol/L Final   01/18/2023 5.2 (H) 3.5 - 5.0 mmol/L Final     Chloride   Date Value Ref Range Status   01/20/2023 99 98 - 107 mmol/L Final   01/19/2023 97 (L) 98 - 107 mmol/L Final   01/18/2023 97 (L) 98 - 107 mmol/L Final     CO2   Date Value Ref Range Status   01/20/2023 33 (H) 22 - 29 mmol/L Final   01/19/2023 33 (H) 22 - 29 mmol/L Final   01/18/2023 39 (H) 22 - 29 mmol/L Final     BUN   Date Value Ref Range Status   01/20/2023 55 (H) 6 - 23 mg/dL Final   01/19/2023 54 (H) 6 - 23 mg/dL Final   01/18/2023 47 (H) 6 - 23 mg/dL Final     Creatinine   Date Value Ref Range Status   01/20/2023 2.1 (H) 0.5 - 1.0 mg/dL Final   01/19/2023 2.0 (H) 0.5 - 1.0 mg/dL Final   01/18/2023 2.2 (H) 0.5 - 1.0 mg/dL Final     Glucose   Date Value Ref Range Status   01/20/2023 170 (H) 74 - 99 mg/dL Final   01/19/2023 235 (H) 74 - 99 mg/dL Final   01/18/2023 147 (H) 74 - 99 mg/dL Final     Calcium   Date Value Ref Range Status   01/20/2023 8.3 (L) 8.6 - 10.2 mg/dL Final   01/19/2023 8.5 (L) 8.6 - 10.2 mg/dL Final   01/18/2023 8.3 (L) 8.6 - 10.2 mg/dL Final     Total Protein   Date Value Ref Range Status   01/17/2023 6.1 (L) 6.4 - 8.3 g/dL Final   01/16/2023 6.3 (L) 6.4 - 8.3 g/dL Final   12/30/2022 5.8 (L) 6.4 - 8.3 g/dL Final     Albumin   Date Value Ref Range Status   01/17/2023 3.6 3.5 - 5.2 g/dL Final   01/16/2023 3.8 3.5 - 5.2 g/dL Final   12/30/2022 3.6 3.5 - 5.2 g/dL Final     Total Bilirubin   Date Value Ref Range Status   01/17/2023 0.5 0.0 - 1.2 mg/dL Final   01/16/2023 0.4 0.0 - 1.2 mg/dL Final   12/30/2022 0.4 0.0 - 1.2 mg/dL Final     Alkaline Phosphatase Date Value Ref Range Status   01/17/2023 98 35 - 104 U/L Final   01/16/2023 96 35 - 104 U/L Final   12/30/2022 94 35 - 104 U/L Final     AST   Date Value Ref Range Status   01/17/2023 9 0 - 31 U/L Final   01/16/2023 10 0 - 31 U/L Final   12/30/2022 11 0 - 31 U/L Final     ALT   Date Value Ref Range Status   01/17/2023 <5 0 - 32 U/L Final   01/16/2023 <5 0 - 32 U/L Final   12/30/2022 <5 0 - 32 U/L Final     Est, Glom Filt Rate   Date Value Ref Range Status   01/20/2023 24 >=60 mL/min/1.73 Final     Comment:     Pediatric calculator link  [email or web address]. [email or web address]_[email or web address]  Effective Oct 3, 2022  These results are not intended for use in patients  <25years of age. eGFR results are calculated without  a race factor using the 2021 CKD-EPI equation. Careful  clinical correlation is recommended, particularly when  comparing to results calculated using previous equations. The CKD-EPI equation is less accurate in patients with  extremes of muscle mass, extra-renal metabolism of  creatinine, excessive creatinine ingestion, or following  therapy that affects renal tubular secretion. 01/19/2023 26 >=60 mL/min/1.73 Final     Comment:     Pediatric calculator link  [email or web address]. [email or web address]_[email or web address]  Effective Oct 3, 2022  These results are not intended for use in patients  <25years of age. eGFR results are calculated without  a race factor using the 2021 CKD-EPI equation. Careful  clinical correlation is recommended, particularly when  comparing to results calculated using previous equations. The CKD-EPI equation is less accurate in patients with  extremes of muscle mass, extra-renal metabolism of  creatinine, excessive creatinine ingestion, or following  therapy that affects renal tubular secretion. 01/18/2023 23 >=60 mL/min/1.73 Final     Comment:     Pediatric calculator link  [email or web address]. [email or web address]_[email or web address]  Effective Oct 3, 2022  These results are not intended for use in patients  <25years of age. eGFR results are calculated without  a race factor using the 2021 CKD-EPI equation. Careful  clinical correlation is recommended, particularly when  comparing to results calculated using previous equations. The CKD-EPI equation is less accurate in patients with  extremes of muscle mass, extra-renal metabolism of  creatinine, excessive creatinine ingestion, or following  therapy that affects renal tubular secretion. GFR    Date Value Ref Range Status   10/16/2022 >60  Final   10/14/2022 59  Final   10/13/2022 >60  Final     Magnesium   Date Value Ref Range Status   12/30/2022 2.0 1.6 - 2.6 mg/dL Final   10/14/2022 2.1 1.6 - 2.6 mg/dL Final   10/08/2022 1.9 1.6 - 2.6 mg/dL Final     Phosphorus   Date Value Ref Range Status   01/20/2023 4.7 (H) 2.5 - 4.5 mg/dL Final   01/19/2023 4.9 (H) 2.5 - 4.5 mg/dL Final   01/18/2023 5.6 (H) 2.5 - 4.5 mg/dL Final     Recent Labs     01/19/23  0509   PH 7.355   PO2 74.8*   PCO2 64.0*   HCO3 34.9*   BE 8.1*   O2SAT 94.2         RADIOLOGY:  CT ABDOMEN PELVIS WO CONTRAST Additional Contrast? None   Final Result   No nephroureterolithiasis or hydronephrosis. Splenomegaly. Small volume of perihepatic ascites. At least moderate-sized partially imaged right pleural effusion with   bibasilar atelectasis. Several locules of gas which appear to be within the endometrium at the level   of the uterine fundus, of uncertain etiology or clinical significance. Please correlate clinically. Diffuse anasarca throughout the soft tissues. Cardiomegaly. XR CHEST PORTABLE   Final Result   Stable right pleural effusion.                Electronically signed by Hari Kirkland MD on 1/21/2023 at 1:40 PM

## 2023-01-21 NOTE — PROGRESS NOTES
[facility] MD Sarah Yun, MD Tootie Block, MD Ruby Cohen, BRADLEY Devlin, NUNU Myers, BRADLEY  Progress Note    1/21/2023    SUBJECTIVE:   1/20: Feeling little better today. Denies new complaint. Still tachypnea, though denies dyspnea no cp/palp Appetite ok ROS otherwise unremarkable    1//21 seen in her room on o2/nc bp stable weak poor po intake   2+ edema in LE   PROBLEM LIST:    Principal Problem:    Acute on chronic respiratory failure with hypoxia and hypercapnia (HCC)  Resolved Problems:    * No resolved hospital problems. *         DIET:    ADULT DIET; Regular; 3 carb choices (45 gm/meal); Low Fat/Low Chol/High Fiber/2 gm Na; Moderately Thick (Honey); 1800 ml  ADULT ORAL NUTRITION SUPPLEMENT; Dinner;  Other Oral Supplement; Gelatein 20     MEDS (scheduled):    linezolid  600 mg Oral 2 times per day    [Held by provider] dilTIAZem  60 mg Oral 3 times per day    sucralfate  1 g Oral 4x Daily    rOPINIRole  1 mg Oral TID    atorvastatin  20 mg Oral Nightly    ipratropium-albuterol  1 vial Inhalation 4x Daily    insulin glargine  13 Units SubCUTAneous BID    [Held by provider] LORazepam  0.5 mg Oral Nightly    [Held by provider] bumetanide  2 mg Oral Daily    metoprolol succinate  25 mg Oral BID    insulin lispro  0-4 Units SubCUTAneous TID WC    insulin lispro  0-4 Units SubCUTAneous Nightly    budesonide  0.5 mg Nebulization BID    doxycycline (VIBRAMYCIN) IV  100 mg IntraVENous Q12H    cefTRIAXone (ROCEPHIN) IV  1,000 mg IntraVENous Q24H    sodium chloride flush  10 mL IntraVENous 2 times per day    miconazole   Topical BID    arformoterol tartrate  15 mcg Nebulization BID    pantoprazole  40 mg Oral QAM AC    apixaban  5 mg Oral BID    mirtazapine  7.5 mg Oral Nightly    aspirin  81 mg Oral Daily    carbidopa-levodopa  1 tablet Oral TID    sertraline  50 mg Oral Daily    montelukast  10 mg Oral Nightly    [Held by provider] midodrine  5 mg Oral TID WC       MEDS (infusions):   sodium chloride      dextrose         MEDS (prn):  loperamide, sodium chloride flush, sodium chloride, promethazine **OR** ondansetron, polyethylene glycol, acetaminophen **OR** acetaminophen, glucose, dextrose bolus **OR** dextrose bolus, glucagon (rDNA), dextrose    PHYSICAL EXAM:     Patient Vitals for the past 24 hrs:   BP Temp Temp src Pulse Resp SpO2 Height   01/21/23 0931 -- -- -- -- -- 93 % --   01/21/23 0930 (!) 142/87 (!) 95.9 °F (35.5 °C) Axillary 86 20 98 % --   01/21/23 0700 -- (!) 94.6 °F (34.8 °C) Rectal -- -- -- --   01/21/23 0639 -- (!) 93.8 °F (34.3 °C) Axillary -- -- -- --   01/21/23 0621 -- -- -- -- 23 -- --   01/21/23 0124 123/75 (!) 96.4 °F (35.8 °C) Axillary 82 22 94 % --   01/21/23 0045 -- -- -- -- 24 -- --   01/20/23 2317 (!) 145/80 -- -- 72 -- -- --   01/20/23 2300 (!) 145/80 -- -- 78 -- 93 % --   01/20/23 2200 136/75 -- -- 75 -- 91 % --   01/20/23 2100 134/66 -- -- 77 (P) 18 91 % --   01/20/23 2000 (!) 144/77 -- -- 76 20 (!) 89 % --   01/20/23 1800 129/63 -- -- 88 20 91 % --   01/20/23 1600 (!) 177/105 (!) 96.7 °F (35.9 °C) Bladder 81 20 (!) 89 % --   01/20/23 1415 -- -- -- -- -- -- 5' (1.524 m)   01/20/23 1400 120/67 -- -- 66 -- 91 % --   01/20/23 1300 (!) 141/69 -- -- 65 -- 92 % --   01/20/23 1200 (!) 169/90 (!) 96.7 °F (35.9 °C) Bladder 70 22 91 % --     @      Intake/Output Summary (Last 24 hours) at 1/21/2023 1152  Last data filed at 1/20/2023 1700  Gross per 24 hour   Intake 1484.4 ml   Output 250 ml   Net 1234.4 ml           Wt Readings from Last 3 Encounters:   01/20/23 273 lb 5 oz (124 kg)   01/16/23 259 lb (117.5 kg)   01/16/23 259 lb (117.5 kg)     Age-appropriate morbidly obese white woman, though easily arousable, no apparent distress  NC/AT EOMI sclera conjunctive are clear and anicteric mucous membranes are moist  Neck soft supple trachea midline  Coarse breath sounds with crackles on the right, mildly prolonged expiratory phase but no wheeze. Arguably a little more clear than yesterday  Regular rhythm normal S1 and S2  Abdomen soft nontender nondistended normal active bowel sounds. Abdominal pannus has substantial edema  Distal extremities, thighs, sacrum have substantial chronic type nonpitting edema, though no pitting edema  Pulses 1+ x4  Moves all 4 extremities  Cranial nerves II through XII grossly intact  Awake, alert, interactive and appropriate  Skin tone consistent with chronic venous stasis distally      DATA:    Recent Labs     01/19/23 0427 01/20/23  0507   WBC 10.6 8.9   HGB 7.7* 8.0*   HCT 26.5* 30.1*   MCV 91.1 95.0    231       Recent Labs     01/19/23  0427 01/20/23  0507    141   K 4.9 4.8   CL 97* 99   CO2 33* 33*   PHOS 4.9* 4.7*   BUN 54* 55*   CREATININE 2.0* 2.1*         Lab Results   Component Value Date    LABPROT 0.5 (H) 01/18/2023    LABPROT 0.5 01/18/2023     On CT no hydro/signs of obstruction     Urine studies  U Na 21, U Cl 23 U prot:cr ratio 0.5    ASSESSMENT / RECOMMENDATIONS:       Assessment  1. Acute kidney injury urine lytes support decrease effective volume     2. CKD stage III, Baseline creatinine 1.4 to 1.7 mg/dL, secondary to diabetic nephropathy and renal microvascular atherosclerotic disease  0.5 g proteinuria     3. Anemia due to CKD, phlebotomy associated prolonged hospitalization     4. Acute hypercapnic and hypoxic respiratory failure due to COPD exacerbation and pneumonia. Does not appear hypervolemic. 5.  Morbid obesity, BMI 50.6 kg per metered square     6. Edema/anasarca, chronic, multifactorial, due to venous insufficiency in the setting of morbid obesity, relative immobility, likely diastolic dysfunction though it was \"indeterminate\" on echo, the does have pulmonary hypertension, mild right-sided heart failure     Recommendations    Stop iv fluids   Bumex 2 mg iv once .    Am labs        Electronically signed by Bartolo Rush MD on 1/21/2023

## 2023-01-21 NOTE — PROGRESS NOTES
NAME: Corinne Magana  MR:  [id number]  :   1950  Admit Date:  2023      This is a face to face encounter with [facility] of this note, including Diagnosis,  Interval History, Past Medical/Surgical/Family/Social Histories, ROS, physical exam, and Assessment and Plan were copied and pasted from Previous. Updates have been made where noted and reflect current exam and medical decision making from the DOS of this encounter. CHIEF COMPLAINT     ID following for   Chief Complaint   Patient presents with    Respiratory Distress     Normally on 3L NC, came in on CPAP, given total of 50mcg push dose epi for low BP by EMS     HISTORY OF PRESENT ILLNESS     Corinne Magana is a 67 y.o. female who presents with   Chief Complaint   Patient presents with    Respiratory Distress     Normally on 3L NC, came in on CPAP, given total of 50mcg push dose epi for low BP by EMS     2023 and has  has a past medical history of A-fib ([address] Acute on chronic congestive heart failure ([address] Anxiety, Asthma, CAD (coronary artery disease), Cancer ([address] Chronic kidney disease, COPD exacerbation ([address] Depression, Diabetes mellitus ([address] H/O mammogram, Hx MRSA infection, Hyperlipidemia, Hypertension, Lateral epicondylitis, Morbid obesity ([address] SERENA on CPAP, Oxygen dependent, Parkinson's disease ([address] and Tubal ligation status. Pt seen and examined 23  In bed   Afebrile hypothermic  Has overall edema     Patient is tolerating medications. No reported adverse drug reactions. Available labs, imaging studies, microbiologic studies have been reviewed with pt and family if present.   Assessment & Plan   Pt was admitted with   NSTEMI (non-ST elevated myocardial infarction) ([address] [I21.4]  Atrial fibrillation with rapid ventricular response (HCC) [I48.91]  Recurrent right pleural effusion [J90]  Hypotension, unspecified hypotension type [I95.9]  Acute on chronic respiratory failure with hypoxia and hypercapnia (HCC) [J96.21, J96.22]    ID following for   Hypothermia   Vre uti left le cons colonized  Right pleural effusion with bibasilar atelectasis. linezolid (ZYVOX) tablet 600 mg, 2 times per day  doxycycline (VIBRAMYCIN) 100 mg in sodium chloride 0.9 % 100 mL IVPB ([id number]), Q12H  cefTRIAXone (ROCEPHIN) 1,000 mg in sterile water 10 mL IV syringe, Q24H     Encourage IS  Cont atbx    BP (!) 142/87   Pulse 86   Temp 97.3 °F (36.3 °C) (Axillary)   Resp 20   Ht 5' (1.524 m)   Wt 273 lb 5 oz (124 kg)   SpO2 93%   BMI 53.38 kg/m²   Temp  Av.8 °F (35.4 °C)  Min: 93.8 °F (34.3 °C)  Max: 97.3 °F (36.3 °C)  CONSTITUTIONAL:  no apparent distress, and appears stated age  ENT:  Normocephalic, atraumatic,     LUNGS:  No increased work of breathing, clear to auscultation bilaterally, no crackles or wheezing  CARDIOVASCULAR:  Normal apical impulse, regular rate and rhythm, normal S1 and S2,  no murmur noted  ABDOMEN:  normal bowel sounds, soft, non-distended, non-tender  MUSCULOSKELETAL:  There is   redness, warmth, or swelling  BLE. Dec  range of motion   edema   NEUROLOGIC:  Awake, alert, oriented. SKIN:  normal skin color, texture, turgor and no rashes  Lines:          Peripheral Intravenous Line:  Peripheral IV 23 Left Antecubital (Active)   Site Assessment Clean, dry & intact 23   Line Status Flushed;Blood return noted;Capped;Normal saline locked 23   Line Care Connections checked and tightened; Chlorhexidine wipes; Line pulled back;Ports disinfected;Cap changed 23   Phlebitis Assessment No symptoms 23   Infiltration Assessment 0 23   Alcohol Cap Used No 23   Dressing Status Clean, dry & intact 23   Dressing Type Transparent 23   Dressing Intervention Other (Comment) 23       Peripheral IV 23 Left;Ventral Forearm (Active)   Site Assessment Clean, dry & intact 23   Line Status Flushed;Brisk blood return; Infusing;Capped 01/20/23 2000   Line Care Connections checked and tightened; Chlorhexidine wipes; Line pulled back;Ports disinfected;Cap changed 01/20/23 2000   Phlebitis Assessment No symptoms 01/20/23 2000   Infiltration Assessment 0 01/20/23 2000   Alcohol Cap Used Yes 01/20/23 2000   Dressing Status Clean, dry & intact 01/20/23 2000   Dressing Type Transparent 01/20/23 2000   Dressing Intervention Other (Comment) 01/20/23 2000        Drain(s):  Urinary Catheter 01/18/23 Diaz-Temperature (Active)   $ Urethral catheter insertion $ Not inserted for procedure 01/18/23 0319   Catheter Indications Need for fluid volume management of the critically ill patient in a critical care setting 01/21/23 0124   Site Assessment Pink;Moist;No urethral drainage 01/21/23 0124   Urine Color Yellow 01/21/23 0124   Urine Appearance Cloudy 01/21/23 0124   Collection Container Standard 01/21/23 0124   Securement Method Securing device (Describe) 01/21/23 0124   Catheter Care Completed Yes 01/20/23 2000   Catheter Best Practices  Drainage tube clipped to bed;Catheter secured to thigh; Tamper seal intact; Bag below bladder;Bag not on floor; Lack of dependent loop in tubing;Drainage bag less than half full 01/21/23 0124   Status Draining;Patent 01/21/23 0124   Output (mL) 250 mL 01/20/23 1400        Microbiology:   No results for input(s): COVID19 in the last 72 hours.   Lab Results   Component Value Date/Time    BC 24 Hours no growth 01/17/2023 05:23 PM    BC 5 Days no growth 10/12/2022 04:59 PM    BC 5 Days no growth 10/06/2022 09:47 AM    BLOODCULT2 24 Hours no growth 01/17/2023 05:23 PM    BLOODCULT2 5 Days no growth 10/12/2022 04:59 PM    BLOODCULT2 5 Days no growth 10/05/2022 11:19 AM    ORG Enterococcus faecium 01/18/2023 03:26 AM    ORG Staphylococcus epidermidis 01/17/2023 10:10 PM    ORG Escherichia coli 10/05/2022 11:19 AM     CULTURE, RESPIRATORY   Date Value Ref Range Status   07/06/2022 Oral Pharyngeal Shavon absent (A)  Final   07/06/2022 Light growth  Final        WOUND/ABSCESS   Date Value Ref Range Status   01/17/2023 Light growth  Final   07/05/2022 Heavy growth  Final   07/05/2022 Heavy growth  Final     Smear, Respiratory   Date Value Ref Range Status   07/06/2022 Refer to ordered Gram stain for results  Final         Component Value Date/Time    AFBCX No growth after 6 weeks of incubation. 10/18/2022 1106    AFBCX No growth after 6 weeks of incubation. 07/08/2022 1546    FUNGSM No Fungal elements seen 07/08/2022 1546    LABFUNG No growth after 4 weeks of incubation. 07/08/2022 1546       MRSA Culture Only   Date Value Ref Range Status   01/17/2023 Methicillin resistant Staph aureus not isolated  Final       LABS:  Recent Labs     01/19/23  0427 01/20/23  0507   WBC 10.6 8.9   RBC 2.91* 3.17*   HGB 7.7* 8.0*   HCT 26.5* 30.1*   MCV 91.1 95.0   MCH 26.5 25.2*   MCHC 29.1* 26.6*   RDW 16.3* 16.4*    231   MPV 10.4 10.5     Recent Labs     01/19/23  0427 01/20/23  0507    141   K 4.9 4.8   CL 97* 99   CO2 33* 33*   BUN 54* 55*   CREATININE 2.0* 2.1*   GLUCOSE 235* 170*   CALCIUM 8.5* 8.3*     Lab Results   Component Value Date    SEDRATE 6 07/06/2022    SEDRATE 15 10/24/2021    SEDRATE 115 (H) 02/03/2021     Lab Results   Component Value Date    CRP 5.0 (H) 07/06/2022    CRP 0.7 (H) 10/25/2021    CRP 13.5 (H) 02/03/2021     Lab Results   Component Value Date    PROCAL 0.33 (H) 01/18/2023    PROCAL 0.24 (H) 07/13/2022    PROCAL 0.22 (H) 07/06/2022          REVIEW OF SYSTEMS     As stated above in the chief complaint, otherwise negative.   CURRENT MEDICATIONS     Current Facility-Administered Medications:     bumetanide (BUMEX) injection 2 mg, 2 mg, IntraVENous, Once, Gerald Ochoa MD    linezolid (ZYVOX) tablet 600 mg, 600 mg, Oral, 2 times per day, Epifanio Yee MD, 600 mg at 01/21/23 1002    [Held by provider] dilTIAZem (CARDIZEM) tablet 60 mg, 60 mg, Oral, 3 times per day, Epifanio Yee, MD, 60 mg at 01/20/23 0616    sucralfate (CARAFATE) tablet 1 g, 1 g, Oral, 4x Daily, Aracelis Echols MD, 1 g at 01/21/23 1002    rOPINIRole (REQUIP) tablet 1 mg, 1 mg, Oral, TID, Aracelis Echols MD, 1 mg at 01/21/23 1002    atorvastatin (LIPITOR) tablet 20 mg, 20 mg, Oral, Nightly, Aracelis Echols MD, 20 mg at 01/20/23 2317    ipratropium-albuterol (DUONEB) nebulizer solution 3 mL, 1 vial, Inhalation, 4x Daily, Aracelis Echols MD, 3 mL at 01/21/23 0931    insulin glargine (LANTUS) injection vial 13 Units, 13 Units, SubCUTAneous, BID, Aracelis Echols MD, 13 Units at 01/20/23 2318    loperamide (IMODIUM) capsule 2 mg, 2 mg, Oral, 4x Daily PRN, Aracelis Echols MD    [Held by provider] LORazepam (ATIVAN) tablet 0.5 mg, 0.5 mg, Oral, Nightly, Aracelis Echols MD, 0.5 mg at 01/20/23 2317    [Held by provider] bumetanide (BUMEX) tablet 2 mg, 2 mg, Oral, Daily, Aracelis Echols MD    metoprolol succinate (TOPROL XL) extended release tablet 25 mg, 25 mg, Oral, BID, Aracelis Echols MD, 25 mg at 01/21/23 1002    insulin lispro (HUMALOG) injection vial 0-4 Units, 0-4 Units, SubCUTAneous, TID WC, Aracelis Echols MD, 1 Units at 01/20/23 1646    insulin lispro (HUMALOG) injection vial 0-4 Units, 0-4 Units, SubCUTAneous, Nightly, Aracelis Echols MD, 4 Units at 01/18/23 2158    budesonide (PULMICORT) nebulizer suspension 500 mcg, 0.5 mg, Nebulization, BID, Aracelis Echols MD, 500 mcg at 01/21/23 4022    doxycycline (VIBRAMYCIN) 100 mg in sodium chloride 0.9 % 100 mL IVPB ([id number]), 100 mg, IntraVENous, Q12H, Aracelis Echols MD, Stopped at 01/21/23 0728    cefTRIAXone (ROCEPHIN) 1,000 mg in sterile water 10 mL IV syringe, 1,000 mg, IntraVENous, Q24H, RASHARD Currie - CNP, 1,000 mg at 01/20/23 2322    sodium chloride flush 0.9 % injection 10 mL, 10 mL, IntraVENous, 2 times per day, Aracelis Echols MD, 10 mL at 01/20/23 2320    sodium chloride flush 0.9 % injection 10 mL, 10 mL, IntraVENous, PRN, Aracelis Echols MD    0.9 % sodium chloride infusion, , IntraVENous, PRN, Vinnie Jurado MD    promethazine (PHENERGAN) tablet 12.5 mg, 12.5 mg, Oral, Q6H PRN **OR** ondansetron (ZOFRAN) injection 4 mg, 4 mg, IntraVENous, Q6H PRN, Vinnie Jurado MD    polyethylene glycol (GLYCOLAX) packet 17 g, 17 g, Oral, Daily PRN, Vinnie Jurado MD    acetaminophen (TYLENOL) tablet 650 mg, 650 mg, Oral, Q6H PRN, 650 mg at 01/19/23 1123 **OR** acetaminophen (TYLENOL) suppository 650 mg, 650 mg, Rectal, Q6H PRN, Vinnie Jurado MD    miconazole (MICOTIN) 2 % powder, , Topical, BID, Phylliss Simmering, APRN - CNP, Given at 01/21/23 1004    arformoterol tartrate (BROVANA) nebulizer solution 15 mcg, 15 mcg, Nebulization, BID, Phylliss Simmering, APRN - CNP, 15 mcg at 01/21/23 6687    pantoprazole (PROTONIX) tablet 40 mg, 40 mg, Oral, QAM AC, Luke Porginski, APRN - CNP, 40 mg at 01/21/23 0624    glucose chewable tablet 16 g, 4 tablet, Oral, PRN, Phylliss Simmering, APRN - CNP    dextrose bolus 10% 125 mL, 125 mL, IntraVENous, PRN **OR** dextrose bolus 10% 250 mL, 250 mL, IntraVENous, PRN, Phylliss Simmering, APRN - CNP    glucagon (rDNA) injection 1 mg, 1 mg, SubCUTAneous, PRN, Phylliss Simmering, APRN - CNP    dextrose 10 % infusion, , IntraVENous, Continuous PRN, Phylliss Simmering, APRN - CNP    apixaban (ELIQUIS) tablet 5 mg, 5 mg, Oral, BID, Luke Porginski, APRN - CNP, 5 mg at 01/21/23 1002    mirtazapine (REMERON) tablet 7.5 mg, 7.5 mg, Oral, Nightly, Luke Porginski, APRN - CNP, 7.5 mg at 01/20/23 2316    aspirin EC tablet 81 mg, 81 mg, Oral, Daily, RASHARD Marshall - CNP, 81 mg at 01/21/23 1002    carbidopa-levodopa (SINEMET CR)  MG per extended release tablet 1 tablet, 1 tablet, Oral, TID, Smiley Narayanan APRN - CNP, 1 tablet at 01/20/23 2317    sertraline (ZOLOFT) tablet 50 mg, 50 mg, Oral, Daily, RASHARD Marshall - CNP, 50 mg at 01/21/23 1002    montelukast (SINGULAIR) tablet 10 mg, 10 mg, Oral, Nightly, Smiley Simnoelle APRN - CNP, 10 mg at 01/20/23 2316    [Held by provider] midodrine (PROAMATINE) tablet 5 mg, 5 mg, Oral, TID , [facility], APRN - CNP  DIAGNOSTIC RESULTS   Radiology:  CT ABDOMEN PELVIS WO CONTRAST Additional Contrast? None    Result Date: 1/18/2023  EXAMINATION: CT OF THE ABDOMEN AND PELVIS WITHOUT CONTRAST 1/18/2023 5:47 am TECHNIQUE: CT of the abdomen and pelvis was performed without the administration of intravenous contrast. Multiplanar reformatted images are provided for review. Automated exposure control, iterative reconstruction, and/or weight based adjustment of the mA/kV was utilized to reduce the radiation dose to as low as reasonably achievable. COMPARISON: October 8, 2022 HISTORY: ORDERING SYSTEM PROVIDED HISTORY: R/O obstructive uropathy TECHNOLOGIST PROVIDED HISTORY: Reason for exam:->R/O obstructive uropathy Additional Contrast?->None FINDINGS: Lower Chest: The heart is enlarged. There is a small pericardial effusion and at least a moderate size right pleural effusion is partially imaged. A trace left pleural effusion is noted. Left lower lobe subsegmental atelectasis is noted. Organs: The gallbladder is surgically absent. The liver has an unremarkable unenhanced appearance. The spleen is enlarged measuring 15.9 cm in craniocaudal dimension. The unenhanced pancreas demonstrates diffuse fatty atrophy. The kidneys are without hydronephrosis or radiopaque calculus. GI/Bowel: No evidence of a bowel obstruction, free air or pneumatosis. Portions of the colon are decompressed, limiting assessment for mucosal based abnormalities. The appendix is not confidently visualized. No obvious pericecal inflammatory changes to suggest acute appendicitis. Pelvis: The urinary bladder is decompressed around a Diaz catheter. A small amount of the intraluminal gas in the urinary bladder may be related to recent instrumentation.   A small amount of gas is present in the uterine fundus, apparently within the endometrial canal.  The ovaries are not confidently visualized. There are scattered visible but nonenlarged pelvic lymph nodes. Peritoneum/Retroperitoneum: A small amount of ascites is present, predominantly in a perihepatic distribution. The abdominal aorta is mildly calcified without evidence of aneurysm. No retroperitoneal lymphadenopathy or mass. Bones/Soft Tissues: No acute osseous abnormality or evidence of an aggressive osseous lesion. Anasarca is present throughout the soft tissues. No nephroureterolithiasis or hydronephrosis. Splenomegaly. Small volume of perihepatic ascites. At least moderate-sized partially imaged right pleural effusion with bibasilar atelectasis. Several locules of gas which appear to be within the endometrium at the level of the uterine fundus, of uncertain etiology or clinical significance. Please correlate clinically. Diffuse anasarca throughout the soft tissues. Cardiomegaly. XR FOOT RIGHT (MIN 3 VIEWS)    Result Date: 1/16/2023  EXAMINATION: THREE XRAY VIEWS OF THE RIGHT FOOT 1/16/2023 12:40 pm COMPARISON: None. HISTORY: ORDERING SYSTEM PROVIDED HISTORY: Evaluate big toe wound TECHNOLOGIST PROVIDED HISTORY: Reason for exam:->Evaluate big toe wound FINDINGS: Soft tissue wound evident at the great toe. No radiographically visible acute osteomyelitis. 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 No radiopaque foreign body or soft tissue emphysema. Plantar heel spur noted. No evident acute osteomyelitis. Great toe soft tissue ulcer. Soft tissue swelling. See above. XR CHEST PORTABLE    Result Date: 1/17/2023  EXAMINATION: ONE XRAY VIEW OF THE CHEST 1/17/2023 5:38 pm COMPARISON: 01/16/2023 HISTORY: ORDERING SYSTEM PROVIDED HISTORY: sob TECHNOLOGIST PROVIDED HISTORY: Reason for exam:->sob FINDINGS: The lungs are without acute focal process. Stable right pleural effusion. No pneumothorax. Stable heart size.   The osseous structures are without acute process. Stable right pleural effusion. XR CHEST PORTABLE    Result Date: 1/16/2023  EXAMINATION: ONE XRAY VIEW OF THE CHEST 1/16/2023 8:53 am COMPARISON: 30 December 2022 HISTORY: ORDERING SYSTEM PROVIDED HISTORY: SOB TECHNOLOGIST PROVIDED HISTORY: Reason for exam:->SOB FINDINGS: Stable right pleural effusion with adjacent atelectasis and or infiltrate. Stable cardiomediastinal silhouette. No new abnormal findings. Stable right-sided pleural effusion with adjacent atelectasis and or infiltrate. XR CHEST 1 VIEW    Result Date: 12/30/2022  EXAMINATION: ONE XRAY VIEW OF THE CHEST 12/30/2022 3:21 pm COMPARISON: 12/19/2022 HISTORY: ORDERING SYSTEM PROVIDED HISTORY: SOB TECHNOLOGIST PROVIDED HISTORY: Reason for exam:->SOB FINDINGS: The heart is enlarged. There is a right pleural effusion right lung base atelectasis. I cannot exclude partial collapse of the right lower lobe. The left lung is clear. There is no pneumothorax. 1. Right pleural effusion right lung base atelectasis. 2. I cannot exclude partial collapse of the right lower lobe 3. Cardiomegaly            Imaging and labs were reviewed per medical records. Thank you for involving me in the care of Deanna Carpio I will continue to follow. Please do not hesitate to call [phone] for any questions or concerns.     Electronically signed by Zain Zavaleta MD on 1/21/2023 at 1:39 PM

## 2023-01-21 NOTE — PROGRESS NOTES
This writer was advised that the patients temp axillary and orally was less than 94 degrees. She was also notified that patient was currently in a bear hugger and that the temp was just turned up. This writer called Dr. Sergio Bellamy and updated her. She advised to get a rectal temp. Rectal temp was taken and read 94.6 degrees. On call hospitalist called and updated at this time. This writer was notified that they would discontinue the patients ativan and let Dr. Tereza Cortez know who would be in the building shortly so he can up up to floor to evaluate the patient. This writer acknowledged the Dr and ended the phone call. New charge nurse Ashley Finnegan RN updated with what the hospitalist stated.  Electronically signed by Carolyn Green RN on 1/21/2023 at 7:38 AM

## 2023-01-22 LAB
ANION GAP SERPL CALCULATED.3IONS-SCNC: 7 MMOL/L (ref 7–16)
ANTISTREPTOLYSIN-O: 151 IU/ML (ref 0–200)
BASOPHILIC STIPPLING: ABNORMAL
BASOPHILS ABSOLUTE: 0 E9/L (ref 0–0.2)
BASOPHILS RELATIVE PERCENT: 0 % (ref 0–2)
BLOOD CULTURE, ROUTINE: NORMAL
BUN BLDV-MCNC: 54 MG/DL (ref 6–23)
CALCIUM SERPL-MCNC: 8.7 MG/DL (ref 8.6–10.2)
CHLORIDE BLD-SCNC: 102 MMOL/L (ref 98–107)
CO2: 34 MMOL/L (ref 22–29)
CREAT SERPL-MCNC: 2.1 MG/DL (ref 0.5–1)
CULTURE, BLOOD 2: NORMAL
EKG ATRIAL RATE: 96 BPM
EKG Q-T INTERVAL: 316 MS
EKG QRS DURATION: 68 MS
EKG QTC CALCULATION (BAZETT): 390 MS
EKG R AXIS: 109 DEGREES
EKG T AXIS: 62 DEGREES
EKG VENTRICULAR RATE: 92 BPM
EOSINOPHILS ABSOLUTE: 0.07 E9/L (ref 0.05–0.5)
EOSINOPHILS RELATIVE PERCENT: 1 % (ref 0–6)
GFR SERPL CREATININE-BSD FRML MDRD: 24 ML/MIN/1.73
GLUCOSE BLD-MCNC: 130 MG/DL (ref 74–99)
HCT VFR BLD CALC: 30.8 % (ref 34–48)
HEMOGLOBIN: 8.2 G/DL (ref 11.5–15.5)
HYPOCHROMIA: ABNORMAL
LYMPHOCYTES ABSOLUTE: 0.99 E9/L (ref 1.5–4)
LYMPHOCYTES RELATIVE PERCENT: 14 % (ref 20–42)
MCH RBC QN AUTO: 24.8 PG (ref 26–35)
MCHC RBC AUTO-ENTMCNC: 26.6 % (ref 32–34.5)
MCV RBC AUTO: 93.1 FL (ref 80–99.9)
METER GLUCOSE: 116 MG/DL (ref 74–99)
METER GLUCOSE: 120 MG/DL (ref 74–99)
METER GLUCOSE: 122 MG/DL (ref 74–99)
METER GLUCOSE: 139 MG/DL (ref 74–99)
MONOCYTES ABSOLUTE: 0.43 E9/L (ref 0.1–0.95)
MONOCYTES RELATIVE PERCENT: 6 % (ref 2–12)
MYELOCYTE PERCENT: 4 % (ref 0–0)
NEUTROPHILS ABSOLUTE: 5.61 E9/L (ref 1.8–7.3)
NEUTROPHILS RELATIVE PERCENT: 75 % (ref 43–80)
NUCLEATED RED BLOOD CELLS: 1 /100 WBC
PDW BLD-RTO: 15.9 FL (ref 11.5–15)
PLATELET # BLD: 189 E9/L (ref 130–450)
PMV BLD AUTO: 10.8 FL (ref 7–12)
POLYCHROMASIA: ABNORMAL
POTASSIUM SERPL-SCNC: 4.6 MMOL/L (ref 3.5–5)
RBC # BLD: 3.31 E12/L (ref 3.5–5.5)
SODIUM BLD-SCNC: 143 MMOL/L (ref 132–146)
WBC # BLD: 7.1 E9/L (ref 4.5–11.5)

## 2023-01-22 PROCEDURE — 6360000002 HC RX W HCPCS: Performed by: INTERNAL MEDICINE

## 2023-01-22 PROCEDURE — 6370000000 HC RX 637 (ALT 250 FOR IP): Performed by: NURSE PRACTITIONER

## 2023-01-22 PROCEDURE — 6370000000 HC RX 637 (ALT 250 FOR IP): Performed by: INTERNAL MEDICINE

## 2023-01-22 PROCEDURE — 94640 AIRWAY INHALATION TREATMENT: CPT

## 2023-01-22 PROCEDURE — 36415 COLL VENOUS BLD VENIPUNCTURE: CPT

## 2023-01-22 PROCEDURE — 2500000003 HC RX 250 WO HCPCS: Performed by: INTERNAL MEDICINE

## 2023-01-22 PROCEDURE — 94660 CPAP INITIATION&MGMT: CPT

## 2023-01-22 PROCEDURE — 6360000002 HC RX W HCPCS: Performed by: NURSE PRACTITIONER

## 2023-01-22 PROCEDURE — 80048 BASIC METABOLIC PNL TOTAL CA: CPT

## 2023-01-22 PROCEDURE — 2060000000 HC ICU INTERMEDIATE R&B

## 2023-01-22 PROCEDURE — 2700000000 HC OXYGEN THERAPY PER DAY

## 2023-01-22 PROCEDURE — 82962 GLUCOSE BLOOD TEST: CPT

## 2023-01-22 PROCEDURE — 99232 SBSQ HOSP IP/OBS MODERATE 35: CPT | Performed by: INTERNAL MEDICINE

## 2023-01-22 PROCEDURE — 85025 COMPLETE CBC W/AUTO DIFF WBC: CPT

## 2023-01-22 PROCEDURE — 2580000003 HC RX 258: Performed by: INTERNAL MEDICINE

## 2023-01-22 RX ORDER — FUROSEMIDE 10 MG/ML
40 INJECTION INTRAMUSCULAR; INTRAVENOUS EVERY 8 HOURS
Status: COMPLETED | OUTPATIENT
Start: 2023-01-22 | End: 2023-01-23

## 2023-01-22 RX ORDER — ALBUMIN (HUMAN) 12.5 G/50ML
SOLUTION INTRAVENOUS
Status: DISPENSED
Start: 2023-01-22 | End: 2023-01-23

## 2023-01-22 RX ORDER — ALBUMIN (HUMAN) 12.5 G/50ML
25 SOLUTION INTRAVENOUS EVERY 8 HOURS
Status: DISCONTINUED | OUTPATIENT
Start: 2023-01-22 | End: 2023-01-22

## 2023-01-22 RX ADMIN — LINEZOLID 600 MG: 600 TABLET, FILM COATED ORAL at 20:58

## 2023-01-22 RX ADMIN — BUDESONIDE 500 MCG: 0.5 SUSPENSION RESPIRATORY (INHALATION) at 06:13

## 2023-01-22 RX ADMIN — INSULIN GLARGINE 13 UNITS: 100 INJECTION, SOLUTION SUBCUTANEOUS at 21:19

## 2023-01-22 RX ADMIN — IPRATROPIUM BROMIDE AND ALBUTEROL SULFATE 3 ML: .5; 2.5 SOLUTION RESPIRATORY (INHALATION) at 18:29

## 2023-01-22 RX ADMIN — MONTELUKAST SODIUM 10 MG: 10 TABLET, FILM COATED ORAL at 20:57

## 2023-01-22 RX ADMIN — IPRATROPIUM BROMIDE AND ALBUTEROL SULFATE 3 ML: .5; 2.5 SOLUTION RESPIRATORY (INHALATION) at 06:12

## 2023-01-22 RX ADMIN — ROPINIROLE HYDROCHLORIDE 1 MG: 1 TABLET, FILM COATED ORAL at 20:57

## 2023-01-22 RX ADMIN — Medication 10 ML: at 20:57

## 2023-01-22 RX ADMIN — ROPINIROLE HYDROCHLORIDE 1 MG: 1 TABLET, FILM COATED ORAL at 08:56

## 2023-01-22 RX ADMIN — CARBIDOPA AND LEVODOPA 1 TABLET: 50; 200 TABLET, EXTENDED RELEASE ORAL at 08:56

## 2023-01-22 RX ADMIN — SUCRALFATE 1 G: 1 TABLET ORAL at 13:12

## 2023-01-22 RX ADMIN — MIRTAZAPINE 7.5 MG: 15 TABLET, FILM COATED ORAL at 20:58

## 2023-01-22 RX ADMIN — DOXYCYCLINE 100 MG: 100 INJECTION, POWDER, LYOPHILIZED, FOR SOLUTION INTRAVENOUS at 18:05

## 2023-01-22 RX ADMIN — SUCRALFATE 1 G: 1 TABLET ORAL at 18:03

## 2023-01-22 RX ADMIN — SUCRALFATE 1 G: 1 TABLET ORAL at 08:56

## 2023-01-22 RX ADMIN — FUROSEMIDE 40 MG: 10 INJECTION, SOLUTION INTRAMUSCULAR; INTRAVENOUS at 13:12

## 2023-01-22 RX ADMIN — ANTI-FUNGAL POWDER MICONAZOLE NITRATE TALC FREE: 1.42 POWDER TOPICAL at 08:58

## 2023-01-22 RX ADMIN — SERTRALINE 50 MG: 50 TABLET, FILM COATED ORAL at 08:57

## 2023-01-22 RX ADMIN — ROPINIROLE HYDROCHLORIDE 1 MG: 1 TABLET, FILM COATED ORAL at 13:12

## 2023-01-22 RX ADMIN — ATORVASTATIN CALCIUM 20 MG: 20 TABLET, FILM COATED ORAL at 20:57

## 2023-01-22 RX ADMIN — INSULIN GLARGINE 13 UNITS: 100 INJECTION, SOLUTION SUBCUTANEOUS at 08:58

## 2023-01-22 RX ADMIN — PANTOPRAZOLE SODIUM 40 MG: 40 TABLET, DELAYED RELEASE ORAL at 06:12

## 2023-01-22 RX ADMIN — IPRATROPIUM BROMIDE AND ALBUTEROL SULFATE 3 ML: .5; 2.5 SOLUTION RESPIRATORY (INHALATION) at 14:25

## 2023-01-22 RX ADMIN — ASPIRIN 81 MG: 81 TABLET, COATED ORAL at 08:56

## 2023-01-22 RX ADMIN — APIXABAN 5 MG: 5 TABLET, FILM COATED ORAL at 08:57

## 2023-01-22 RX ADMIN — CARBIDOPA AND LEVODOPA 1 TABLET: 50; 200 TABLET, EXTENDED RELEASE ORAL at 20:57

## 2023-01-22 RX ADMIN — APIXABAN 5 MG: 5 TABLET, FILM COATED ORAL at 20:58

## 2023-01-22 RX ADMIN — ARFORMOTEROL TARTRATE 15 MCG: 15 SOLUTION RESPIRATORY (INHALATION) at 18:28

## 2023-01-22 RX ADMIN — BUDESONIDE 500 MCG: 0.5 SUSPENSION RESPIRATORY (INHALATION) at 18:28

## 2023-01-22 RX ADMIN — CARBIDOPA AND LEVODOPA 1 TABLET: 50; 200 TABLET, EXTENDED RELEASE ORAL at 18:03

## 2023-01-22 RX ADMIN — METOPROLOL SUCCINATE 25 MG: 25 TABLET, FILM COATED, EXTENDED RELEASE ORAL at 08:56

## 2023-01-22 RX ADMIN — ARFORMOTEROL TARTRATE 15 MCG: 15 SOLUTION RESPIRATORY (INHALATION) at 06:13

## 2023-01-22 RX ADMIN — IPRATROPIUM BROMIDE AND ALBUTEROL SULFATE 3 ML: .5; 2.5 SOLUTION RESPIRATORY (INHALATION) at 09:33

## 2023-01-22 RX ADMIN — Medication 10 ML: at 08:57

## 2023-01-22 RX ADMIN — METOPROLOL SUCCINATE 25 MG: 25 TABLET, FILM COATED, EXTENDED RELEASE ORAL at 21:19

## 2023-01-22 RX ADMIN — LINEZOLID 600 MG: 600 TABLET, FILM COATED ORAL at 08:57

## 2023-01-22 RX ADMIN — FUROSEMIDE 40 MG: 10 INJECTION, SOLUTION INTRAMUSCULAR; INTRAVENOUS at 20:58

## 2023-01-22 RX ADMIN — SUCRALFATE 1 G: 1 TABLET ORAL at 20:57

## 2023-01-22 RX ADMIN — DOXYCYCLINE 100 MG: 100 INJECTION, POWDER, LYOPHILIZED, FOR SOLUTION INTRAVENOUS at 06:12

## 2023-01-22 RX ADMIN — ANTI-FUNGAL POWDER MICONAZOLE NITRATE TALC FREE: 1.42 POWDER TOPICAL at 21:18

## 2023-01-22 ASSESSMENT — PAIN SCALES - GENERAL
PAINLEVEL_OUTOF10: 0

## 2023-01-22 NOTE — PROGRESS NOTES
NAME: Beatriz Carpenter  MR:  [id number]  :   1950  Admit Date:  2023      This is a face to face encounter with [facility] of this note, including Diagnosis,  Interval History, Past Medical/Surgical/Family/Social Histories, ROS, physical exam, and Assessment and Plan were copied and pasted from Previous. Updates have been made where noted and reflect current exam and medical decision making from the DOS of this encounter. CHIEF COMPLAINT     ID following for   Chief Complaint   Patient presents with    Respiratory Distress     Normally on 3L NC, came in on CPAP, given total of 50mcg push dose epi for low BP by EMS     HISTORY OF PRESENT ILLNESS     Beatriz Carpenter is a 67 y.o. female who presents with   Chief Complaint   Patient presents with    Respiratory Distress     Normally on 3L NC, came in on CPAP, given total of 50mcg push dose epi for low BP by EMS     2023 and has  has a past medical history of A-fib ([address] Acute on chronic congestive heart failure ([address] Anxiety, Asthma, CAD (coronary artery disease), Cancer ([address] Chronic kidney disease, COPD exacerbation ([address] Depression, Diabetes mellitus ([address] H/O mammogram, Hx MRSA infection, Hyperlipidemia, Hypertension, Lateral epicondylitis, Morbid obesity ([address] SERENA on CPAP, Oxygen dependent, Parkinson's disease ([address] and Tubal ligation status. Pt seen and examined 23  In bed   Eating c/o sob   Afebrile  temp better        Patient is tolerating medications. No reported adverse drug reactions.   Available labs, imaging studies, microbiologic studies have been reviewed        Assessment & Plan   Pt was admitted with   NSTEMI (non-ST elevated myocardial infarction) ([address] [I21.4]  Atrial fibrillation with rapid ventricular response ([address] [I48.91]  Recurrent right pleural effusion [J90]  Hypotension, unspecified hypotension type [I95.9]  Acute on chronic respiratory failure with hypoxia and hypercapnia ([address] [K81.70, J96.22]    ID following for   Hypothermia better   Vre uti   left le cons colonized  Right pleural effusion with bibasilar atelectasis. Encourage IS  Cont atbx    /76   Pulse 85   Temp 96.8 °F (36 °C) (Axillary)   Resp 24   Ht 5' (1.524 m)   Wt 273 lb 5 oz (124 kg)   SpO2 99%   BMI 53.38 kg/m²   Temp  Av.5 °F (36.4 °C)  Min: 96.8 °F (36 °C)  Max: 98.4 °F (36.9 °C)  CONSTITUTIONAL:  no apparent distress, and appears stated age on o3   ENT:  Normocephalic, atraumatic,     LUNGS:  No increased work of breathing,  dec  to auscultation bilaterally, no crackles or wheezing  CARDIOVASCULAR:    regular rate and rhythm, normal S1 and S2,  no murmur noted  ABDOMEN:  normal bowel sounds, soft, distended, non-tender  MUSCULOSKELETAL:   swelling  BLE. Dec  range of motion   edema   NEUROLOGIC:  Awake, alert, oriented. SKIN:  normal skin color, texture, turgor and no rashes bruises  Lines:          Peripheral Intravenous Line:  Peripheral IV 23 Left Antecubital (Active)   Site Assessment Clean, dry & intact 23   Line Status Flushed;Blood return noted;Capped;Normal saline locked 23   Line Care Connections checked and tightened; Chlorhexidine wipes; Line pulled back;Ports disinfected;Cap changed 23   Phlebitis Assessment No symptoms 23   Infiltration Assessment 0 23   Alcohol Cap Used No 23   Dressing Status Clean, dry & intact 23   Dressing Type Transparent 23   Dressing Intervention Other (Comment) 23       Peripheral IV 23 Left;Ventral Forearm (Active)   Site Assessment Clean, dry & intact 23   Line Status Flushed;Brisk blood return; Infusing;Capped 23   Line Care Connections checked and tightened; Chlorhexidine wipes; Line pulled back;Ports disinfected;Cap changed 23   Phlebitis Assessment No symptoms 23   Infiltration Assessment 0 23 2000   Alcohol Cap Used Yes 01/20/23 2000   Dressing Status Clean, dry & intact 01/20/23 2000   Dressing Type Transparent 01/20/23 2000   Dressing Intervention Other (Comment) 01/20/23 2000        Drain(s):  Urinary Catheter 01/18/23 Diaz-Temperature (Active)   $ Urethral catheter insertion $ Not inserted for procedure 01/18/23 0319   Catheter Indications Need for fluid volume management of the critically ill patient in a critical care setting 01/21/23 0124   Site Assessment Pink;Moist;No urethral drainage 01/21/23 0124   Urine Color Yellow 01/21/23 0124   Urine Appearance Cloudy 01/21/23 0124   Collection Container Standard 01/21/23 0124   Securement Method Securing device (Describe) 01/21/23 0124   Catheter Care Completed Yes 01/20/23 2000   Catheter Best Practices  Drainage tube clipped to bed;Catheter secured to thigh; Tamper seal intact; Bag below bladder;Bag not on floor; Lack of dependent loop in tubing;Drainage bag less than half full 01/21/23 0124   Status Draining;Patent 01/21/23 0124   Output (mL) 250 mL 01/20/23 1400        Microbiology:   No results for input(s): COVID19 in the last 72 hours.   Lab Results   Component Value Date/Time    BC 24 Hours no growth 01/17/2023 05:23 PM    BC 5 Days no growth 10/12/2022 04:59 PM    BC 5 Days no growth 10/06/2022 09:47 AM    BLOODCULT2 24 Hours no growth 01/17/2023 05:23 PM    BLOODCULT2 5 Days no growth 10/12/2022 04:59 PM    BLOODCULT2 5 Days no growth 10/05/2022 11:19 AM    ORG Enterococcus faecium 01/18/2023 03:26 AM    ORG Staphylococcus epidermidis 01/17/2023 10:10 PM    ORG Escherichia coli 10/05/2022 11:19 AM     CULTURE, RESPIRATORY   Date Value Ref Range Status   07/06/2022 Oral Pharyngeal Shavon absent (A)  Final   07/06/2022 Light growth  Final        WOUND/ABSCESS   Date Value Ref Range Status   01/17/2023 Light growth  Final   07/05/2022 Heavy growth  Final   07/05/2022 Heavy growth  Final     Smear, Respiratory   Date Value Ref Range Status 07/06/2022 Refer to ordered Gram stain for results  Final         Component Value Date/Time    AFBCX No growth after 6 weeks of incubation. 10/18/2022 1106    AFBCX No growth after 6 weeks of incubation. 07/08/2022 1546    FUNGSM No Fungal elements seen 07/08/2022 1546    LABFUNG No growth after 4 weeks of incubation. 07/08/2022 1546       MRSA Culture Only   Date Value Ref Range Status   01/17/2023 Methicillin resistant Staph aureus not isolated  Final       LABS:  Recent Labs     01/20/23  0507 01/22/23  1107   WBC 8.9 7.1   RBC 3.17* 3.31*   HGB 8.0* 8.2*   HCT 30.1* 30.8*   MCV 95.0 93.1   MCH 25.2* 24.8*   MCHC 26.6* 26.6*   RDW 16.4* 15.9*    189   MPV 10.5 10.8       Recent Labs     01/20/23  0507 01/22/23  1107    143   K 4.8 4.6   CL 99 102   CO2 33* 34*   BUN 55* 54*   CREATININE 2.1* 2.1*   GLUCOSE 170* 130*   CALCIUM 8.3* 8.7       Lab Results   Component Value Date    SEDRATE 6 07/06/2022    SEDRATE 15 10/24/2021    SEDRATE 115 (H) 02/03/2021     Lab Results   Component Value Date    CRP 5.0 (H) 07/06/2022    CRP 0.7 (H) 10/25/2021    CRP 13.5 (H) 02/03/2021     Lab Results   Component Value Date    PROCAL 0.33 (H) 01/18/2023    PROCAL 0.24 (H) 07/13/2022    PROCAL 0.22 (H) 07/06/2022          REVIEW OF SYSTEMS     As stated above in the chief complaint, otherwise negative.   CURRENT MEDICATIONS     Current Facility-Administered Medications:     albumin human 25 % IV solution 25 g, 25 g, IntraVENous, Q8H, Lázaro Purcell MD    furosemide (LASIX) injection 40 mg, 40 mg, IntraVENous, Q8H, Lázaro Purcell MD, 40 mg at 01/22/23 1312    albumin human 25 % IV solution, , , ,     linezolid (ZYVOX) tablet 600 mg, 600 mg, Oral, 2 times per day, Epifanio Yee MD, 600 mg at 01/22/23 0857    [Held by provider] dilTIAZem (CARDIZEM) tablet 60 mg, 60 mg, Oral, 3 times per day, Epifanio Yee MD, 60 mg at 01/20/23 0616    sucralfate (CARAFATE) tablet 1 g, 1 g, Oral, 4x Daily, Shayla Stapleton MD, 1 g at 01/22/23 1312    rOPINIRole (REQUIP) tablet 1 mg, 1 mg, Oral, TID, Minus MD Do, 1 mg at 01/22/23 1312    atorvastatin (LIPITOR) tablet 20 mg, 20 mg, Oral, Nightly, Minus MD Do, 20 mg at 01/21/23 2230    ipratropium-albuterol (DUONEB) nebulizer solution 3 mL, 1 vial, Inhalation, 4x Daily, Minus MD Do, 3 mL at 01/22/23 1425    insulin glargine (LANTUS) injection vial 13 Units, 13 Units, SubCUTAneous, BID, Minus MD Do, 13 Units at 01/22/23 0858    loperamide (IMODIUM) capsule 2 mg, 2 mg, Oral, 4x Daily PRN, Minus MD Do    [Held by provider] LORazepam (ATIVAN) tablet 0.5 mg, 0.5 mg, Oral, Nightly, Minus MD Do, 0.5 mg at 01/20/23 2317    [Held by provider] bumetanide (BUMEX) tablet 2 mg, 2 mg, Oral, Daily, Minus MD Do    metoprolol succinate (TOPROL XL) extended release tablet 25 mg, 25 mg, Oral, BID, Minus MD Do, 25 mg at 01/22/23 0856    insulin lispro (HUMALOG) injection vial 0-4 Units, 0-4 Units, SubCUTAneous, TID WC, Minus MD Do, 1 Units at 01/20/23 1646    insulin lispro (HUMALOG) injection vial 0-4 Units, 0-4 Units, SubCUTAneous, Nightly, Minus MD Do, 4 Units at 01/18/23 2158    budesonide (PULMICORT) nebulizer suspension 500 mcg, 0.5 mg, Nebulization, BID, Minus MD Do, 500 mcg at 01/22/23 8098    doxycycline (VIBRAMYCIN) 100 mg in sodium chloride 0.9 % 100 mL IVPB ([id number]), 100 mg, IntraVENous, Q12H, Minus MD Do, Stopped at 01/22/23 0829    sodium chloride flush 0.9 % injection 10 mL, 10 mL, IntraVENous, 2 times per day, Minus MD Do, 10 mL at 01/22/23 0857    sodium chloride flush 0.9 % injection 10 mL, 10 mL, IntraVENous, PRN, Minus MD Do    0.9 % sodium chloride infusion, , IntraVENous, PRN, Minus MD Do    promethazine (PHENERGAN) tablet 12.5 mg, 12.5 mg, Oral, Q6H PRN **OR** ondansetron (ZOFRAN) injection 4 mg, 4 mg, IntraVENous, Q6H PRN, Minus MD Do    polyethylene glycol (GLYCOLAX) packet 17 g, 17 g, Oral, Daily PRN, Jackson Brewer MD    acetaminophen (TYLENOL) tablet 650 mg, 650 mg, Oral, Q6H PRN, 650 mg at 01/19/23 1123 **OR** acetaminophen (TYLENOL) suppository 650 mg, 650 mg, Rectal, Q6H PRN, Jackson Brewer MD    miconazole (MICOTIN) 2 % powder, , Topical, BID, RASHARD Helm - CNP, Given at 01/22/23 0858    arformoterol tartrate (BROVANA) nebulizer solution 15 mcg, 15 mcg, Nebulization, BID, RASHARD Helm - CNP, 15 mcg at 01/22/23 3006    pantoprazole (PROTONIX) tablet 40 mg, 40 mg, Oral, QAM AC, RASHARD Marshall - CNP, 40 mg at 01/22/23 0612    glucose chewable tablet 16 g, 4 tablet, Oral, PRN, RASHARD Helm CNP    dextrose bolus 10% 125 mL, 125 mL, IntraVENous, PRN **OR** dextrose bolus 10% 250 mL, 250 mL, IntraVENous, PRN, RASHARD Helm CNP    glucagon (rDNA) injection 1 mg, 1 mg, SubCUTAneous, PRN, RASHARD Helm CNP    dextrose 10 % infusion, , IntraVENous, Continuous PRN, RASHARD Helm - CNP    apixaban (ELIQUIS) tablet 5 mg, 5 mg, Oral, BID, RASHARD Helm - CNP, 5 mg at 01/22/23 0857    mirtazapine (REMERON) tablet 7.5 mg, 7.5 mg, Oral, Nightly, RASHARD Helm - CNP, 7.5 mg at 01/21/23 2233    aspirin EC tablet 81 mg, 81 mg, Oral, Daily, RASHARD Marshall - CNP, 81 mg at 01/22/23 0856    carbidopa-levodopa (SINEMET CR)  MG per extended release tablet 1 tablet, 1 tablet, Oral, TID, RASHARD Helm - CNP, 1 tablet at 01/22/23 0856    sertraline (ZOLOFT) tablet 50 mg, 50 mg, Oral, Daily, RASAHRD Marshall CNP, 50 mg at 01/22/23 0857    montelukast (SINGULAIR) tablet 10 mg, 10 mg, Oral, Nightly, RASHARD Helm CNP, 10 mg at 01/21/23 2230    [Held by provider] midodrine (PROAMATINE) tablet 5 mg, 5 mg, Oral, TID WC, RASHARD Helm CNP  DIAGNOSTIC RESULTS   Radiology:  CT ABDOMEN PELVIS WO CONTRAST Additional Contrast? None    Result Date: 1/18/2023  EXAMINATION: CT OF THE ABDOMEN AND PELVIS WITHOUT CONTRAST 1/18/2023 5:47 am TECHNIQUE: CT of the abdomen and pelvis was performed without the administration of intravenous contrast. Multiplanar reformatted images are provided for review. Automated exposure control, iterative reconstruction, and/or weight based adjustment of the mA/kV was utilized to reduce the radiation dose to as low as reasonably achievable. COMPARISON: October 8, 2022 HISTORY: ORDERING SYSTEM PROVIDED HISTORY: R/O obstructive uropathy TECHNOLOGIST PROVIDED HISTORY: Reason for exam:->R/O obstructive uropathy Additional Contrast?->None FINDINGS: Lower Chest: The heart is enlarged. There is a small pericardial effusion and at least a moderate size right pleural effusion is partially imaged. A trace left pleural effusion is noted. Left lower lobe subsegmental atelectasis is noted. Organs: The gallbladder is surgically absent. The liver has an unremarkable unenhanced appearance. The spleen is enlarged measuring 15.9 cm in craniocaudal dimension. The unenhanced pancreas demonstrates diffuse fatty atrophy. The kidneys are without hydronephrosis or radiopaque calculus. GI/Bowel: No evidence of a bowel obstruction, free air or pneumatosis. Portions of the colon are decompressed, limiting assessment for mucosal based abnormalities. The appendix is not confidently visualized. No obvious pericecal inflammatory changes to suggest acute appendicitis. Pelvis: The urinary bladder is decompressed around a Diaz catheter. A small amount of the intraluminal gas in the urinary bladder may be related to recent instrumentation. A small amount of gas is present in the uterine fundus, apparently within the endometrial canal.  The ovaries are not confidently visualized. There are scattered visible but nonenlarged pelvic lymph nodes. Peritoneum/Retroperitoneum: A small amount of ascites is present, predominantly in a perihepatic distribution.   The abdominal aorta is mildly calcified without evidence of aneurysm. No retroperitoneal lymphadenopathy or mass. Bones/Soft Tissues: No acute osseous abnormality or evidence of an aggressive osseous lesion. Anasarca is present throughout the soft tissues. No nephroureterolithiasis or hydronephrosis. Splenomegaly. Small volume of perihepatic ascites. At least moderate-sized partially imaged right pleural effusion with bibasilar atelectasis. Several locules of gas which appear to be within the endometrium at the level of the uterine fundus, of uncertain etiology or clinical significance. Please correlate clinically. Diffuse anasarca throughout the soft tissues. Cardiomegaly. XR FOOT RIGHT (MIN 3 VIEWS)    Result Date: 1/16/2023  EXAMINATION: THREE XRAY VIEWS OF THE RIGHT FOOT 1/16/2023 12:40 pm COMPARISON: None. HISTORY: ORDERING SYSTEM PROVIDED HISTORY: Evaluate big toe wound TECHNOLOGIST PROVIDED HISTORY: Reason for exam:->Evaluate big toe wound FINDINGS: Soft tissue wound evident at the great toe. No radiographically visible acute osteomyelitis. 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 No radiopaque foreign body or soft tissue emphysema. Plantar heel spur noted. No evident acute osteomyelitis. Great toe soft tissue ulcer. Soft tissue swelling. See above. XR CHEST PORTABLE    Result Date: 1/17/2023  EXAMINATION: ONE XRAY VIEW OF THE CHEST 1/17/2023 5:38 pm COMPARISON: 01/16/2023 HISTORY: ORDERING SYSTEM PROVIDED HISTORY: sob TECHNOLOGIST PROVIDED HISTORY: Reason for exam:->sob FINDINGS: The lungs are without acute focal process. Stable right pleural effusion. No pneumothorax. Stable heart size. The osseous structures are without acute process. Stable right pleural effusion.      XR CHEST PORTABLE    Result Date: 1/16/2023  EXAMINATION: ONE XRAY VIEW OF THE CHEST 1/16/2023 8:53 am COMPARISON: 30 December 2022 HISTORY: ORDERING SYSTEM PROVIDED HISTORY: SOB TECHNOLOGIST PROVIDED HISTORY: Reason for exam:->SOB FINDINGS: Stable right pleural effusion with adjacent atelectasis and or infiltrate. Stable cardiomediastinal silhouette. No new abnormal findings. Stable right-sided pleural effusion with adjacent atelectasis and or infiltrate. XR CHEST 1 VIEW    Result Date: 12/30/2022  EXAMINATION: ONE XRAY VIEW OF THE CHEST 12/30/2022 3:21 pm COMPARISON: 12/19/2022 HISTORY: ORDERING SYSTEM PROVIDED HISTORY: SOB TECHNOLOGIST PROVIDED HISTORY: Reason for exam:->SOB FINDINGS: The heart is enlarged. There is a right pleural effusion right lung base atelectasis. I cannot exclude partial collapse of the right lower lobe. The left lung is clear. There is no pneumothorax. 1. Right pleural effusion right lung base atelectasis. 2. I cannot exclude partial collapse of the right lower lobe 3. Cardiomegaly            Imaging and labs were reviewed per medical records. Thank you for involving me in the care of Pita Demarco I will continue to follow. Please do not hesitate to call [phone] for any questions or concerns.     Electronically signed by Liz Gregg MD on 1/22/2023 at 3:18 PM

## 2023-01-22 NOTE — PROGRESS NOTES
[facility] MD Scooter Carrera MD Saundra Saucer, MD Gilles Snipe, BRADLEY Devlin, ANP  oDra Castle, BRADLEY  Progress Note    1/22/2023    SUBJECTIVE:   1/20: Feeling little better today. Denies new complaint. Still tachypnea, though denies dyspnea no cp/palp Appetite ok ROS otherwise unremarkable    1//21 seen in her room on o2/nc bp stable weak poor po intake   2+ edema in LE     1/22 charts reviewed . On bipap    PROBLEM LIST:    Principal Problem:    Acute on chronic respiratory failure with hypoxia and hypercapnia (HCC)  Resolved Problems:    * No resolved hospital problems. *         DIET:    ADULT DIET; Regular; 3 carb choices (45 gm/meal); Low Fat/Low Chol/High Fiber/2 gm Na; Moderately Thick (Honey); 1800 ml  ADULT ORAL NUTRITION SUPPLEMENT; Dinner;  Other Oral Supplement; Gelatein 20     MEDS (scheduled):    albumin human  25 g IntraVENous Q8H    furosemide  40 mg IntraVENous Q8H    albumin human        linezolid  600 mg Oral 2 times per day    [Held by provider] dilTIAZem  60 mg Oral 3 times per day    sucralfate  1 g Oral 4x Daily    rOPINIRole  1 mg Oral TID    atorvastatin  20 mg Oral Nightly    ipratropium-albuterol  1 vial Inhalation 4x Daily    insulin glargine  13 Units SubCUTAneous BID    [Held by provider] LORazepam  0.5 mg Oral Nightly    [Held by provider] bumetanide  2 mg Oral Daily    metoprolol succinate  25 mg Oral BID    insulin lispro  0-4 Units SubCUTAneous TID WC    insulin lispro  0-4 Units SubCUTAneous Nightly    budesonide  0.5 mg Nebulization BID    doxycycline (VIBRAMYCIN) IV  100 mg IntraVENous Q12H    sodium chloride flush  10 mL IntraVENous 2 times per day    miconazole   Topical BID    arformoterol tartrate  15 mcg Nebulization BID    pantoprazole  40 mg Oral QAM AC    apixaban  5 mg Oral BID    mirtazapine  7.5 mg Oral Nightly    aspirin  81 mg Oral Daily    carbidopa-levodopa  1 tablet Oral TID    sertraline  50 mg Oral Daily montelukast  10 mg Oral Nightly    [Held by provider] midodrine  5 mg Oral TID WC       MEDS (infusions):   sodium chloride      dextrose         MEDS (prn):  loperamide, sodium chloride flush, sodium chloride, promethazine **OR** ondansetron, polyethylene glycol, acetaminophen **OR** acetaminophen, glucose, dextrose bolus **OR** dextrose bolus, glucagon (rDNA), dextrose    PHYSICAL EXAM:     Patient Vitals for the past 24 hrs:   BP Temp Temp src Pulse Resp SpO2   01/22/23 1312 120/76 96.8 °F (36 °C) Axillary 85 24 99 %   01/22/23 0844 106/67 98.4 °F (36.9 °C) Oral (!) 119 22 94 %   01/22/23 0616 -- -- -- -- 22 --   01/21/23 2224 104/75 97.3 °F (36.3 °C) Axillary 92 22 97 %   01/21/23 1833 -- -- -- -- 23 --     @      Intake/Output Summary (Last 24 hours) at 1/22/2023 1528  Last data filed at 1/22/2023 1312  Gross per 24 hour   Intake 180 ml   Output 1475 ml   Net -1295 ml           Wt Readings from Last 3 Encounters:   01/20/23 273 lb 5 oz (124 kg)   01/16/23 259 lb (117.5 kg)   01/16/23 259 lb (117.5 kg)     Age-appropriate morbidly obese white woman, though easily arousable, no apparent distress  NC/AT EOMI sclera conjunctive are clear and anicteric mucous membranes are moist  Neck soft supple trachea midline  Coarse breath sounds with crackles on the right, mildly prolonged expiratory phase but no wheeze. Arguably a little more clear than yesterday  Regular rhythm normal S1 and S2  Abdomen soft nontender nondistended normal active bowel sounds.   Abdominal pannus has substantial edema  Distal extremities, thighs, sacrum have substantial chronic type nonpitting edema, though no pitting edema  Pulses 1+ x4  Moves all 4 extremities  Cranial nerves II through XII grossly intact  Awake, alert, interactive and appropriate  Skin tone consistent with chronic venous stasis distally      DATA:    Recent Labs     01/20/23  0507 01/22/23  1107   WBC 8.9 7.1   HGB 8.0* 8.2*   HCT 30.1* 30.8*   MCV 95.0 93.1    189 Recent Labs     01/20/23  0507 01/22/23  1107    143   K 4.8 4.6   CL 99 102   CO2 33* 34*   PHOS 4.7*  --    BUN 55* 54*   CREATININE 2.1* 2.1*         Lab Results   Component Value Date    LABPROT 0.5 (H) 01/18/2023    LABPROT 0.5 01/18/2023     On CT no hydro/signs of obstruction     Urine studies  U Na 21, U Cl 23 U prot:cr ratio 0.5    ASSESSMENT / RECOMMENDATIONS:       Assessment  1. Acute kidney injury urine lytes support decrease effective volume     2. CKD stage III, Baseline creatinine 1.4 to 1.7 mg/dL, secondary to diabetic nephropathy and renal microvascular atherosclerotic disease  0.5 g proteinuria     3. Anemia due to CKD, phlebotomy associated prolonged hospitalization     4. Acute hypercapnic and hypoxic respiratory failure due to COPD exacerbation and pneumonia. Does not appear hypervolemic. 5.  Morbid obesity, BMI 50.6 kg per metered square     6.   Edema/anasarca, chronic, multifactorial, due to venous insufficiency in the setting of morbid obesity, relative immobility, likely diastolic dysfunction though it was \"indeterminate\" on echo, the does have pulmonary hypertension, mild right-sided heart failure     Recommendations      Continue iv diuresis as ordered lasix 40 iv q 8hr    Fu serial bmps UO        Electronically signed by Eagle Costello MD on 1/22/2023

## 2023-01-22 NOTE — PROGRESS NOTES
Admitting Date and Time: 1/17/2023  5:16 PM  Admit Dx: NSTEMI (non-ST elevated myocardial infarction) ([facility] [address] [I21.4]  Atrial fibrillation with rapid ventricular response (HCC) [I48.91]  Recurrent right pleural effusion [J90]  Hypotension, unspecified hypotension type [I95.9]  Acute on chronic respiratory failure with hypoxia and hypercapnia (HCC) [J96.21, J96.22]    Subjective  Restarted bear hugger last night but in am temp ok  Per RN: nsr,  off bipap for short breaks otherwise keeps it on at day and at night  ROS: denies fever, chills, cp, sob, n/v, HA unless stated above.     linezolid  600 mg Oral 2 times per day    [Held by provider] dilTIAZem  60 mg Oral 3 times per day    sucralfate  1 g Oral 4x Daily    rOPINIRole  1 mg Oral TID    atorvastatin  20 mg Oral Nightly    ipratropium-albuterol  1 vial Inhalation 4x Daily    insulin glargine  13 Units SubCUTAneous BID    [Held by provider] LORazepam  0.5 mg Oral Nightly    [Held by provider] bumetanide  2 mg Oral Daily    metoprolol succinate  25 mg Oral BID    insulin lispro  0-4 Units SubCUTAneous TID WC    insulin lispro  0-4 Units SubCUTAneous Nightly    budesonide  0.5 mg Nebulization BID    doxycycline (VIBRAMYCIN) IV  100 mg IntraVENous Q12H    sodium chloride flush  10 mL IntraVENous 2 times per day    miconazole   Topical BID    arformoterol tartrate  15 mcg Nebulization BID    pantoprazole  40 mg Oral QAM AC    apixaban  5 mg Oral BID    mirtazapine  7.5 mg Oral Nightly    aspirin  81 mg Oral Daily    carbidopa-levodopa  1 tablet Oral TID    sertraline  50 mg Oral Daily    montelukast  10 mg Oral Nightly    [Held by provider] midodrine  5 mg Oral TID WC     loperamide, 2 mg, 4x Daily PRN  sodium chloride flush, 10 mL, PRN  sodium chloride, , PRN  promethazine, 12.5 mg, Q6H PRN   Or  ondansetron, 4 mg, Q6H PRN  polyethylene glycol, 17 g, Daily PRN  acetaminophen, 650 mg, Q6H PRN   Or  acetaminophen, 650 mg, Q6H PRN  glucose, 4 tablet, PRN  dextrose bolus, 125 mL, PRN   Or  dextrose bolus, 250 mL, PRN  glucagon (rDNA), 1 mg, PRN  dextrose, , Continuous PRN       Objective:    /67   Pulse (!) 119   Temp 98.4 °F (36.9 °C) (Oral)   Resp 22   Ht 5' (1.524 m)   Wt 273 lb 5 oz (124 kg)   SpO2 94%   BMI 53.38 kg/m²   General Appearance: alert and oriented to person, place and time and in no acute distress  Skin: warm and dry  Head: normocephalic and atraumatic  Eyes: pupils equal, round, and reactive to light, extraocular eye movements intact, conjunctivae normal  Neck: neck supple and non tender without mass   Pulmonary/Chest: clear to auscultation bilaterally- no wheezes, rales or rhonchi, normal air movement, no respiratory distress  Cardiovascular: normal rate, normal S1 and S2 and no carotid bruits  Abdomen: soft, non-tender, non-distended, normal bowel sounds, no masses or organomegaly  Extremities: no cyanosis, no clubbing   Anasarca  Neurologic: no cranial nerve deficit and speech normal      Recent Labs     01/20/23  0507 01/22/23  1107    143   K 4.8 4.6   CL 99 102   CO2 33* 34*   BUN 55* 54*   CREATININE 2.1* 2.1*   GLUCOSE 170* 130*   CALCIUM 8.3* 8.7         No results for input(s): ALKPHOS, PROT, LABALBU, BILITOT, AST, ALT in the last 72 hours. Recent Labs     01/20/23  0507 01/22/23  1107   WBC 8.9 7.1   RBC 3.17* 3.31*   HGB 8.0* 8.2*   HCT 30.1* 30.8*   MCV 95.0 93.1   MCH 25.2* 24.8*   MCHC 26.6* 26.6*   RDW 16.4* 15.9*    189   MPV 10.5 10.8             Radiology:   CT ABDOMEN PELVIS WO CONTRAST Additional Contrast? None   Final Result   No nephroureterolithiasis or hydronephrosis. Splenomegaly. Small volume of perihepatic ascites. At least moderate-sized partially imaged right pleural effusion with   bibasilar atelectasis. Several locules of gas which appear to be within the endometrium at the level   of the uterine fundus, of uncertain etiology or clinical significance. Please correlate clinically. Diffuse anasarca throughout the soft tissues. Cardiomegaly. XR CHEST PORTABLE   Final Result   Stable right pleural effusion. Assessment:  Principal Problem:    Acute on chronic respiratory failure with hypoxia and hypercapnia (HCC)  Resolved Problems:    * No resolved hospital problems. *      Plan: History of present illness from History and Physical:  This is a 67 y.o. female  has a past medical history of A-fib ([facility] [address] Acute on chronic congestive heart failure ([facility] [address] Anxiety, Asthma, CAD (coronary artery disease), Cancer ([facility] [address] Chronic kidney disease, COPD exacerbation ([facility] [address] Depression, Diabetes mellitus ([facility] [address] H/O mammogram, Hx MRSA infection, Hyperlipidemia, Hypertension, Lateral epicondylitis, Morbid obesity ([facility] [address] ESRENA on CPAP, Oxygen dependent, Parkinson's disease ([facility] [address] and Tubal ligation status. presented with SOB, hypotension  for last few days prior to arrival to the hospital. SOB is associated with some cough, nonproductive but no fever or chills reported. Initially SOB was mild, intermittent but gradually getting more persistent. Started gradually. Exacerbated by general activity or exertion. Relieved only partially by rest. In ED patient was found to be hypotensive with Afib RVR and respiratory failure and was placed on BIPAP. BP improved after midodrine dose. The patient was seen and examined at bedside, appears alert and awake with no acute distress and is able to answer simple  questions. On direct questioning, patient denied any  resting ongoing chest pain, hemoptysis, productive cough, fever, ongoing palpitation, active abdominal pain, hematemesis, rectal bleeding, blessing, hematuria, any other  and GI complaints, and any new focal neuro deficits     Acute hypercapnic respiratory failure contributed to by: underlying copd (not in exacerbation)   SERENA noncompliant with CPAP & Obesity induced hypoventilation syndrome  No pna on image; bipap today.  O2s still at 4 liters with minor adjustments  o2, nebs    P A fib eliquis no other dvt prophy needed, rate control  Then RVR in pm of 1/18 so ccb gtts 1/19 switched to po then held on 1/20. Gave bb  Uti: >100,000 CFU/ml   Vancomycin resistant Enterococci isolated so change Ceftriaxone to linezolid  Mandeep: ivf stopped. Will give albimun and lasix today x 3 doses but will then let dr Dolly Martinez adjust further. Daily wt increased. Crt the same  Dm: diabetic diet, ssc  on 3 carb choice  low sodium, carb controlled diet  Parkinson l dopa  Full code  Dispo: pt, consider place    NOTE: This report was transcribed using voice recognition software. Every effort was made to ensure accuracy; however, inadvertent computerized transcription errors may be present.      Electronically signed by Steven Goetz MD on 1/22/2023 at 12:24 PM

## 2023-01-22 NOTE — PLAN OF CARE

## 2023-01-22 NOTE — PROGRESS NOTES
Pulmonary/Critical Care Consult Note    CHIEF COMPLAINT: Shortness of breath and hypotension      IMPRESSION/ PLAN:    Acute on chronic hypoxic and hypercapnic respiratory failure  COPD   Obesity induced hypoventilation syndrome  SERENA noncompliant with CPAP  BiPAP qhs    DuoNeb and Brolukasza    Stable overall  Likely at baseline (although states SOB today)      CHF - Diastolic  Pleural effusions  Very elevated pro-BNP    Hypotension  History of   Improved / resolved    UTI with VRE  On Linezolid PO  ID consulted    Severe pulmonary hypertension  Secondary to above      SHANTANU on CKD  Hyperkalemia  Elevated K+ resolved  Close to baseline  Nephrology consultation appreciated   Follow     Chronic atrial fibrillation   Rate controlled  On metoprolol / dilt  Eliquis      Type 2 diabetes   Insulin sliding scale      Anemia  Transfuse for Hgb  < 7    Intertrigo  Wound culture to left lower leg  Miconazole powder to rash on lower abdominal folds twice daily    1/22/2023   Overall stable   No real change    Discharge planning  PT/OT  __________________________________________    Subjective      Events last 24 hr  Continuing to use BiPAP when sleeping       HISTORY OF PRESENT ILLNESS: Patient is a 62-year-old female with history of COPD, CAD, breast cancer, CKD, hypertension, hyperlipidemia, diabetes mellitus, obstructive sleep apnea noncompliant with CPAP, oxygen dependence, and Parkinson's disease. Patient presented to the ED on 1/17/2023 for increased shortness of breath over the past 2 days. Patient placed on BiPAP on presentation to the ED and initial ABG was likely mixed or venous. Repeat ABG on AVAPS showed a pH of 7.257, PCO2 90, PO2 183.4, HCO3 39.2, and SPO2 98.6%. The patient also received 1 L normal saline bolus and midodrine 10 mg p.o. x1 for hypotension which the patient takes at the nursing home 3 times daily. The patient did respond well to the IV fluids and does not require vasopressor support at this time. Lactic acid normal at 1.7. According to the patient's family she is supposed to wear CPAP at night but is noncompliant. She uses supplemental oxygen as needed. Portable chest x-ray shows stable right pleural effusion and no other acute cardiopulmonary process.     ALLERGY:  Ciprofloxacin, Codeine, and Digoxin and related            MEDICAL HISTORY:  Past Medical History:   Diagnosis Date    A-fib ([facility]     Acute on chronic congestive heart failure (HCC)     Anxiety     Asthma     CAD (coronary artery disease) 01/21/2016    Cancer ([facility]  breast ca 2006    right lumpectomy    Chronic kidney disease     nephrolithiasis    COPD exacerbation ([facility] 10/12/2022    Depression     Diabetes mellitus ([facility]     H/O mammogram     Hx MRSA infection     toe infection january 2012    Hyperlipidemia     Hypertension     Lateral epicondylitis     Morbid obesity (HCC)     SERENA on CPAP     Oxygen dependent     Parkinson's disease ([facility]     Tubal ligation status        MEDICATIONS:   linezolid  600 mg Oral 2 times per day    [Held by provider] dilTIAZem  60 mg Oral 3 times per day    sucralfate  1 g Oral 4x Daily    rOPINIRole  1 mg Oral TID    atorvastatin  20 mg Oral Nightly    ipratropium-albuterol  1 vial Inhalation 4x Daily    insulin glargine  13 Units SubCUTAneous BID    [Held by provider] LORazepam  0.5 mg Oral Nightly    [Held by provider] bumetanide  2 mg Oral Daily    metoprolol succinate  25 mg Oral BID    insulin lispro  0-4 Units SubCUTAneous TID WC    insulin lispro  0-4 Units SubCUTAneous Nightly    budesonide  0.5 mg Nebulization BID    doxycycline (VIBRAMYCIN) IV  100 mg IntraVENous Q12H    sodium chloride flush  10 mL IntraVENous 2 times per day    miconazole   Topical BID    arformoterol tartrate  15 mcg Nebulization BID    pantoprazole  40 mg Oral QAM AC    apixaban  5 mg Oral BID    mirtazapine  7.5 mg Oral Nightly    aspirin  81 mg Oral Daily    carbidopa-levodopa  1 tablet Oral TID    sertraline  50 mg Oral Daily    montelukast  10 mg Oral Nightly    [Held by provider] midodrine  5 mg Oral TID WC      sodium chloride      dextrose           PHYSICAL EXAM:  Vitals:    01/22/23 0844   BP: 106/67   Pulse: (!) 119   Resp: 22   Temp: 98.4 °F (36.9 °C)   SpO2: 94%     FiO2 : 40 %  O2 Flow Rate (L/min): 4 L/min  O2 Device: PAP (positive airway pressure)    Constitutional: No fever, chills, diaphoresis  HEENT: No head lesions, PERRL, EOMI, mouth without lesions, no nasal lesions, no cervical adenopathy palpated   Respiratory: Lungs with equal breath sounds bilaterally diminished with no  wheezes, no accessory muscle use   CV: Rapid rate and irregular rhythm, no murmurs, JVD, bilateral leg edema  Abdomen: Soft, obese, non tender, + bowel sounds, no lesions   Skin: Adequate turgor, no rash, capillary refill <2 seconds, rash noted to bilateral lower abdominal folds, wound noted to left anterior shin  Extremities: Muscular strength 4/4 in 4 limbs, moves 4 limbs spontaneously, distal pulses present   Neurology: Awake and alert, follows commands, moves 4 limbs on command and spontaneously, equal sensation, no dysmetria, neck is supple, no meningitic signs present.       LABS:  WBC   Date Value Ref Range Status   01/20/2023 8.9 4.5 - 11.5 E9/L Final   01/19/2023 10.6 4.5 - 11.5 E9/L Final   01/18/2023 9.9 4.5 - 11.5 E9/L Final     Hemoglobin   Date Value Ref Range Status   01/20/2023 8.0 (L) 11.5 - 15.5 g/dL Final   01/19/2023 7.7 (L) 11.5 - 15.5 g/dL Final   01/18/2023 7.3 (L) 11.5 - 15.5 g/dL Final     Hematocrit   Date Value Ref Range Status   01/20/2023 30.1 (L) 34.0 - 48.0 % Final   01/19/2023 26.5 (L) 34.0 - 48.0 % Final   01/18/2023 26.5 (L) 34.0 - 48.0 % Final     MCV   Date Value Ref Range Status   01/20/2023 95.0 80.0 - 99.9 fL Final   01/19/2023 91.1 80.0 - 99.9 fL Final   01/18/2023 93.3 80.0 - 99.9 fL Final     Platelets   Date Value Ref Range Status   01/20/2023 231 130 - 450 E9/L Final   01/19/2023 210 130 - 450 E9/L Final   01/18/2023 174 130 - 450 E9/L Final     Sodium   Date Value Ref Range Status   01/20/2023 141 132 - 146 mmol/L Final   01/19/2023 141 132 - 146 mmol/L Final   01/18/2023 141 132 - 146 mmol/L Final     Potassium reflex Magnesium   Date Value Ref Range Status   01/20/2023 4.8 3.5 - 5.0 mmol/L Final   01/19/2023 4.9 3.5 - 5.0 mmol/L Final   01/18/2023 5.2 (H) 3.5 - 5.0 mmol/L Final     Chloride   Date Value Ref Range Status   01/20/2023 99 98 - 107 mmol/L Final   01/19/2023 97 (L) 98 - 107 mmol/L Final   01/18/2023 97 (L) 98 - 107 mmol/L Final     CO2   Date Value Ref Range Status   01/20/2023 33 (H) 22 - 29 mmol/L Final   01/19/2023 33 (H) 22 - 29 mmol/L Final   01/18/2023 39 (H) 22 - 29 mmol/L Final     BUN   Date Value Ref Range Status   01/20/2023 55 (H) 6 - 23 mg/dL Final   01/19/2023 54 (H) 6 - 23 mg/dL Final   01/18/2023 47 (H) 6 - 23 mg/dL Final     Creatinine   Date Value Ref Range Status   01/20/2023 2.1 (H) 0.5 - 1.0 mg/dL Final   01/19/2023 2.0 (H) 0.5 - 1.0 mg/dL Final   01/18/2023 2.2 (H) 0.5 - 1.0 mg/dL Final     Glucose   Date Value Ref Range Status   01/20/2023 170 (H) 74 - 99 mg/dL Final   01/19/2023 235 (H) 74 - 99 mg/dL Final   01/18/2023 147 (H) 74 - 99 mg/dL Final     Calcium   Date Value Ref Range Status   01/20/2023 8.3 (L) 8.6 - 10.2 mg/dL Final   01/19/2023 8.5 (L) 8.6 - 10.2 mg/dL Final   01/18/2023 8.3 (L) 8.6 - 10.2 mg/dL Final     Total Protein   Date Value Ref Range Status   01/17/2023 6.1 (L) 6.4 - 8.3 g/dL Final   01/16/2023 6.3 (L) 6.4 - 8.3 g/dL Final   12/30/2022 5.8 (L) 6.4 - 8.3 g/dL Final     Albumin   Date Value Ref Range Status   01/17/2023 3.6 3.5 - 5.2 g/dL Final   01/16/2023 3.8 3.5 - 5.2 g/dL Final   12/30/2022 3.6 3.5 - 5.2 g/dL Final     Total Bilirubin   Date Value Ref Range Status   01/17/2023 0.5 0.0 - 1.2 mg/dL Final   01/16/2023 0.4 0.0 - 1.2 mg/dL Final   12/30/2022 0.4 0.0 - 1.2 mg/dL Final     Alkaline Phosphatase   Date Value Ref Range Status   01/17/2023 98 35 - 104 U/L Final   01/16/2023 96 35 - 104 U/L Final   12/30/2022 94 35 - 104 U/L Final     AST   Date Value Ref Range Status   01/17/2023 9 0 - 31 U/L Final   01/16/2023 10 0 - 31 U/L Final   12/30/2022 11 0 - 31 U/L Final     ALT   Date Value Ref Range Status   01/17/2023 <5 0 - 32 U/L Final   01/16/2023 <5 0 - 32 U/L Final   12/30/2022 <5 0 - 32 U/L Final     Est, Glom Filt Rate   Date Value Ref Range Status   01/20/2023 24 >=60 mL/min/1.73 Final     Comment:     Pediatric calculator link  [facility]. [email or web address]_[email or web address]  Effective Oct 3, 2022  These results are not intended for use in patients  <25years of age. eGFR results are calculated without  a race factor using the 2021 CKD-EPI equation. Careful  clinical correlation is recommended, particularly when  comparing to results calculated using previous equations. The CKD-EPI equation is less accurate in patients with  extremes of muscle mass, extra-renal metabolism of  creatinine, excessive creatinine ingestion, or following  therapy that affects renal tubular secretion. 01/19/2023 26 >=60 mL/min/1.73 Final     Comment:     Pediatric calculator link  [facility]. [email or web address]_[email or web address]  Effective Oct 3, 2022  These results are not intended for use in patients  <25years of age. eGFR results are calculated without  a race factor using the 2021 CKD-EPI equation. Careful  clinical correlation is recommended, particularly when  comparing to results calculated using previous equations. The CKD-EPI equation is less accurate in patients with  extremes of muscle mass, extra-renal metabolism of  creatinine, excessive creatinine ingestion, or following  therapy that affects renal tubular secretion. 01/18/2023 23 >=60 mL/min/1.73 Final     Comment:     Pediatric calculator link  [facility]. [email or web address]_[email or web address]  Effective Oct 3, 2022  These results are not intended for use in patients  <25years of age. eGFR results are calculated without  a race factor using the 2021 CKD-EPI equation. Careful  clinical correlation is recommended, particularly when  comparing to results calculated using previous equations. The CKD-EPI equation is less accurate in patients with  extremes of muscle mass, extra-renal metabolism of  creatinine, excessive creatinine ingestion, or following  therapy that affects renal tubular secretion. GFR    Date Value Ref Range Status   10/16/2022 >60  Final   10/14/2022 59  Final   10/13/2022 >60  Final     Magnesium   Date Value Ref Range Status   12/30/2022 2.0 1.6 - 2.6 mg/dL Final   10/14/2022 2.1 1.6 - 2.6 mg/dL Final   10/08/2022 1.9 1.6 - 2.6 mg/dL Final     Phosphorus   Date Value Ref Range Status   01/20/2023 4.7 (H) 2.5 - 4.5 mg/dL Final   01/19/2023 4.9 (H) 2.5 - 4.5 mg/dL Final   01/18/2023 5.6 (H) 2.5 - 4.5 mg/dL Final     No results for input(s): PH, PO2, PCO2, HCO3, BE, O2SAT in the last 72 hours. RADIOLOGY:  CT ABDOMEN PELVIS WO CONTRAST Additional Contrast? None   Final Result   No nephroureterolithiasis or hydronephrosis. Splenomegaly. Small volume of perihepatic ascites. At least moderate-sized partially imaged right pleural effusion with   bibasilar atelectasis. Several locules of gas which appear to be within the endometrium at the level   of the uterine fundus, of uncertain etiology or clinical significance. Please correlate clinically. Diffuse anasarca throughout the soft tissues. Cardiomegaly. XR CHEST PORTABLE   Final Result   Stable right pleural effusion.                Electronically signed by Estefany Dahl MD on 1/22/2023 at 11:16 AM

## 2023-01-23 LAB
ANION GAP SERPL CALCULATED.3IONS-SCNC: 8 MMOL/L (ref 7–16)
BUN BLDV-MCNC: 53 MG/DL (ref 6–23)
CALCIUM SERPL-MCNC: 8.7 MG/DL (ref 8.6–10.2)
CHLORIDE BLD-SCNC: 104 MMOL/L (ref 98–107)
CO2: 34 MMOL/L (ref 22–29)
CREAT SERPL-MCNC: 2.1 MG/DL (ref 0.5–1)
EKG ATRIAL RATE: 107 BPM
EKG Q-T INTERVAL: 302 MS
EKG QRS DURATION: 78 MS
EKG QTC CALCULATION (BAZETT): 406 MS
EKG R AXIS: 90 DEGREES
EKG T AXIS: -64 DEGREES
EKG VENTRICULAR RATE: 109 BPM
GFR SERPL CREATININE-BSD FRML MDRD: 24 ML/MIN/1.73
GLUCOSE BLD-MCNC: 80 MG/DL (ref 74–99)
METER GLUCOSE: 123 MG/DL (ref 74–99)
METER GLUCOSE: 125 MG/DL (ref 74–99)
METER GLUCOSE: 162 MG/DL (ref 74–99)
METER GLUCOSE: 89 MG/DL (ref 74–99)
POTASSIUM SERPL-SCNC: 4.2 MMOL/L (ref 3.5–5)
SODIUM BLD-SCNC: 146 MMOL/L (ref 132–146)

## 2023-01-23 PROCEDURE — 99232 SBSQ HOSP IP/OBS MODERATE 35: CPT | Performed by: INTERNAL MEDICINE

## 2023-01-23 PROCEDURE — 6370000000 HC RX 637 (ALT 250 FOR IP): Performed by: NURSE PRACTITIONER

## 2023-01-23 PROCEDURE — 36415 COLL VENOUS BLD VENIPUNCTURE: CPT

## 2023-01-23 PROCEDURE — 6370000000 HC RX 637 (ALT 250 FOR IP): Performed by: INTERNAL MEDICINE

## 2023-01-23 PROCEDURE — 94640 AIRWAY INHALATION TREATMENT: CPT

## 2023-01-23 PROCEDURE — 80048 BASIC METABOLIC PNL TOTAL CA: CPT

## 2023-01-23 PROCEDURE — 6360000002 HC RX W HCPCS: Performed by: NURSE PRACTITIONER

## 2023-01-23 PROCEDURE — 94660 CPAP INITIATION&MGMT: CPT

## 2023-01-23 PROCEDURE — 6360000002 HC RX W HCPCS: Performed by: INTERNAL MEDICINE

## 2023-01-23 PROCEDURE — 2060000000 HC ICU INTERMEDIATE R&B

## 2023-01-23 PROCEDURE — 2580000003 HC RX 258: Performed by: INTERNAL MEDICINE

## 2023-01-23 PROCEDURE — 82962 GLUCOSE BLOOD TEST: CPT

## 2023-01-23 PROCEDURE — 2700000000 HC OXYGEN THERAPY PER DAY

## 2023-01-23 RX ORDER — FUROSEMIDE 10 MG/ML
60 INJECTION INTRAMUSCULAR; INTRAVENOUS EVERY 8 HOURS
Status: COMPLETED | OUTPATIENT
Start: 2023-01-23 | End: 2023-01-24

## 2023-01-23 RX ADMIN — ANTI-FUNGAL POWDER MICONAZOLE NITRATE TALC FREE: 1.42 POWDER TOPICAL at 20:47

## 2023-01-23 RX ADMIN — ROPINIROLE HYDROCHLORIDE 1 MG: 1 TABLET, FILM COATED ORAL at 20:46

## 2023-01-23 RX ADMIN — ROPINIROLE HYDROCHLORIDE 1 MG: 1 TABLET, FILM COATED ORAL at 08:37

## 2023-01-23 RX ADMIN — INSULIN GLARGINE 13 UNITS: 100 INJECTION, SOLUTION SUBCUTANEOUS at 20:50

## 2023-01-23 RX ADMIN — CARBIDOPA AND LEVODOPA 1 TABLET: 50; 200 TABLET, EXTENDED RELEASE ORAL at 15:55

## 2023-01-23 RX ADMIN — IPRATROPIUM BROMIDE AND ALBUTEROL SULFATE 3 ML: .5; 2.5 SOLUTION RESPIRATORY (INHALATION) at 16:14

## 2023-01-23 RX ADMIN — SUCRALFATE 1 G: 1 TABLET ORAL at 13:13

## 2023-01-23 RX ADMIN — ROPINIROLE HYDROCHLORIDE 1 MG: 1 TABLET, FILM COATED ORAL at 13:13

## 2023-01-23 RX ADMIN — FUROSEMIDE 60 MG: 10 INJECTION, SOLUTION INTRAMUSCULAR; INTRAVENOUS at 23:39

## 2023-01-23 RX ADMIN — LINEZOLID 600 MG: 600 TABLET, FILM COATED ORAL at 20:46

## 2023-01-23 RX ADMIN — ASPIRIN 81 MG: 81 TABLET, COATED ORAL at 08:37

## 2023-01-23 RX ADMIN — SUCRALFATE 1 G: 1 TABLET ORAL at 08:37

## 2023-01-23 RX ADMIN — SUCRALFATE 1 G: 1 TABLET ORAL at 17:52

## 2023-01-23 RX ADMIN — FUROSEMIDE 40 MG: 10 INJECTION, SOLUTION INTRAMUSCULAR; INTRAVENOUS at 13:13

## 2023-01-23 RX ADMIN — ATORVASTATIN CALCIUM 20 MG: 20 TABLET, FILM COATED ORAL at 20:46

## 2023-01-23 RX ADMIN — CARBIDOPA AND LEVODOPA 1 TABLET: 50; 200 TABLET, EXTENDED RELEASE ORAL at 08:37

## 2023-01-23 RX ADMIN — FUROSEMIDE 60 MG: 10 INJECTION, SOLUTION INTRAMUSCULAR; INTRAVENOUS at 15:55

## 2023-01-23 RX ADMIN — ARFORMOTEROL TARTRATE 15 MCG: 15 SOLUTION RESPIRATORY (INHALATION) at 06:03

## 2023-01-23 RX ADMIN — SUCRALFATE 1 G: 1 TABLET ORAL at 20:46

## 2023-01-23 RX ADMIN — BUDESONIDE 500 MCG: 0.5 SUSPENSION RESPIRATORY (INHALATION) at 16:14

## 2023-01-23 RX ADMIN — IPRATROPIUM BROMIDE AND ALBUTEROL SULFATE 3 ML: .5; 2.5 SOLUTION RESPIRATORY (INHALATION) at 09:04

## 2023-01-23 RX ADMIN — ANTI-FUNGAL POWDER MICONAZOLE NITRATE TALC FREE: 1.42 POWDER TOPICAL at 08:42

## 2023-01-23 RX ADMIN — MONTELUKAST SODIUM 10 MG: 10 TABLET, FILM COATED ORAL at 20:46

## 2023-01-23 RX ADMIN — ARFORMOTEROL TARTRATE 15 MCG: 15 SOLUTION RESPIRATORY (INHALATION) at 16:14

## 2023-01-23 RX ADMIN — METOPROLOL SUCCINATE 25 MG: 25 TABLET, FILM COATED, EXTENDED RELEASE ORAL at 20:46

## 2023-01-23 RX ADMIN — METOPROLOL SUCCINATE 25 MG: 25 TABLET, FILM COATED, EXTENDED RELEASE ORAL at 08:37

## 2023-01-23 RX ADMIN — BUDESONIDE 500 MCG: 0.5 SUSPENSION RESPIRATORY (INHALATION) at 06:03

## 2023-01-23 RX ADMIN — IPRATROPIUM BROMIDE AND ALBUTEROL SULFATE 3 ML: .5; 2.5 SOLUTION RESPIRATORY (INHALATION) at 06:03

## 2023-01-23 RX ADMIN — ACETAMINOPHEN 650 MG: 325 TABLET ORAL at 12:04

## 2023-01-23 RX ADMIN — APIXABAN 5 MG: 5 TABLET, FILM COATED ORAL at 20:46

## 2023-01-23 RX ADMIN — MIRTAZAPINE 7.5 MG: 15 TABLET, FILM COATED ORAL at 20:46

## 2023-01-23 RX ADMIN — Medication 10 ML: at 08:42

## 2023-01-23 RX ADMIN — IPRATROPIUM BROMIDE AND ALBUTEROL SULFATE 3 ML: .5; 2.5 SOLUTION RESPIRATORY (INHALATION) at 12:53

## 2023-01-23 RX ADMIN — APIXABAN 5 MG: 5 TABLET, FILM COATED ORAL at 08:37

## 2023-01-23 RX ADMIN — SERTRALINE 50 MG: 50 TABLET, FILM COATED ORAL at 08:37

## 2023-01-23 RX ADMIN — FUROSEMIDE 40 MG: 10 INJECTION, SOLUTION INTRAMUSCULAR; INTRAVENOUS at 05:10

## 2023-01-23 RX ADMIN — Medication 10 ML: at 23:43

## 2023-01-23 RX ADMIN — CARBIDOPA AND LEVODOPA 1 TABLET: 50; 200 TABLET, EXTENDED RELEASE ORAL at 20:46

## 2023-01-23 RX ADMIN — PANTOPRAZOLE SODIUM 40 MG: 40 TABLET, DELAYED RELEASE ORAL at 05:10

## 2023-01-23 RX ADMIN — INSULIN GLARGINE 13 UNITS: 100 INJECTION, SOLUTION SUBCUTANEOUS at 08:49

## 2023-01-23 RX ADMIN — LINEZOLID 600 MG: 600 TABLET, FILM COATED ORAL at 08:37

## 2023-01-23 ASSESSMENT — PAIN DESCRIPTION - LOCATION: LOCATION: GENERALIZED

## 2023-01-23 ASSESSMENT — PAIN SCALES - GENERAL
PAINLEVEL_OUTOF10: 0
PAINLEVEL_OUTOF10: 3
PAINLEVEL_OUTOF10: 0

## 2023-01-23 ASSESSMENT — PAIN SCALES - WONG BAKER
WONGBAKER_NUMERICALRESPONSE: 0
WONGBAKER_NUMERICALRESPONSE: 0

## 2023-01-23 NOTE — PROGRESS NOTES
[facility] MD Samuel Guardado MD Octaviano Highland, MD Layman Anis, BRADLEY Devlin, NUNU Barajas, BRADLEY  Progress Note    1/23/2023    SUBJECTIVE:   1/20: Feeling little better today. Denies new complaint. Still tachypnea, though denies dyspnea no cp/palp Appetite ok ROS otherwise unremarkable    1//21 seen in her room on o2/nc bp stable weak poor po intake   2+ edema in LE     1/22 charts reviewed . On bipap    1/23 : on o2/nc . Still look hypervolemic     PROBLEM LIST:    Principal Problem:    Acute on chronic respiratory failure with hypoxia and hypercapnia (HCC)  Resolved Problems:    * No resolved hospital problems. *         DIET:    ADULT DIET; Regular; 3 carb choices (45 gm/meal); Low Fat/Low Chol/High Fiber/2 gm Na; Moderately Thick (Honey); 1800 ml  ADULT ORAL NUTRITION SUPPLEMENT; Dinner;  Other Oral Supplement; Gelatein 20     MEDS (scheduled):    linezolid  600 mg Oral 2 times per day    [Held by provider] dilTIAZem  60 mg Oral 3 times per day    sucralfate  1 g Oral 4x Daily    rOPINIRole  1 mg Oral TID    atorvastatin  20 mg Oral Nightly    ipratropium-albuterol  1 vial Inhalation 4x Daily    insulin glargine  13 Units SubCUTAneous BID    [Held by provider] LORazepam  0.5 mg Oral Nightly    metoprolol succinate  25 mg Oral BID    insulin lispro  0-4 Units SubCUTAneous TID WC    insulin lispro  0-4 Units SubCUTAneous Nightly    budesonide  0.5 mg Nebulization BID    sodium chloride flush  10 mL IntraVENous 2 times per day    miconazole   Topical BID    arformoterol tartrate  15 mcg Nebulization BID    pantoprazole  40 mg Oral QAM AC    apixaban  5 mg Oral BID    mirtazapine  7.5 mg Oral Nightly    aspirin  81 mg Oral Daily    carbidopa-levodopa  1 tablet Oral TID    sertraline  50 mg Oral Daily    montelukast  10 mg Oral Nightly    [Held by provider] midodrine  5 mg Oral TID WC       MEDS (infusions):   sodium chloride      dextrose         MEDS (prn):  loperamide, sodium chloride flush, sodium chloride, promethazine **OR** ondansetron, polyethylene glycol, acetaminophen **OR** acetaminophen, glucose, dextrose bolus **OR** dextrose bolus, glucagon (rDNA), dextrose    PHYSICAL EXAM:     Patient Vitals for the past 24 hrs:   BP Temp Temp src Pulse Resp SpO2 Weight   01/23/23 0907 -- -- -- -- 22 -- --   01/23/23 0904 -- -- -- 95 23 95 % --   01/23/23 0845 -- -- -- -- -- -- 281 lb (127.5 kg)   01/23/23 0815 -- -- -- (!) 110 -- -- --   01/23/23 0749 119/71 98.4 °F (36.9 °C) Oral (!) 103 18 90 % --   01/23/23 0626 -- -- -- 97 20 99 % --   01/23/23 0607 -- -- -- -- 22 -- --   01/23/23 0605 -- -- -- 97 23 99 % --   01/23/23 0509 107/60 97.5 °F (36.4 °C) Axillary 80 22 96 % --   01/23/23 0217 -- -- -- -- 24 -- --   01/22/23 2209 -- -- -- -- 23 -- --   01/22/23 2053 (!) 105/51 97.4 °F (36.3 °C) Axillary 90 22 95 % --     @      Intake/Output Summary (Last 24 hours) at 1/23/2023 1519  Last data filed at 1/23/2023 1407  Gross per 24 hour   Intake 748.64 ml   Output 200 ml   Net 548.64 ml           Wt Readings from Last 3 Encounters:   01/23/23 281 lb (127.5 kg)   01/16/23 259 lb (117.5 kg)   01/16/23 259 lb (117.5 kg)     Age-appropriate morbidly obese white woman, though easily arousable, no apparent distress  NC/AT EOMI sclera conjunctive are clear and anicteric mucous membranes are moist  Neck soft supple trachea midline  Coarse breath sounds with crackles on the right, mildly prolonged expiratory phase but no wheeze. Arguably a little more clear than yesterday  Regular rhythm normal S1 and S2  Abdomen soft nontender nondistended normal active bowel sounds.   Abdominal pannus has substantial edema  Distal extremities, thighs, sacrum have substantial chronic type nonpitting edema, though no pitting edema  Pulses 1+ x4  Moves all 4 extremities  Cranial nerves II through XII grossly intact  Awake, alert, interactive and appropriate  Skin tone consistent with chronic venous stasis distally      DATA:    Recent Labs     01/22/23  1107   WBC 7.1   HGB 8.2*   HCT 30.8*   MCV 93.1          Recent Labs     01/22/23  1107 01/23/23  0448    146   K 4.6 4.2    104   CO2 34* 34*   BUN 54* 53*   CREATININE 2.1* 2.1*         Lab Results   Component Value Date    LABPROT 0.5 (H) 01/18/2023    LABPROT 0.5 01/18/2023     On CT no hydro/signs of obstruction     Urine studies  U Na 21, U Cl 23 U prot:cr ratio 0.5    ASSESSMENT / RECOMMENDATIONS:       Assessment  1. Acute kidney injury urine lytes support decrease effective volume     2. CKD stage III, Baseline creatinine 1.4 to 1.7 mg/dL, secondary to diabetic nephropathy and renal microvascular atherosclerotic disease  0.5 g proteinuria     3. Anemia due to CKD, phlebotomy associated prolonged hospitalization     4. Acute hypercapnic and hypoxic respiratory failure due to COPD exacerbation and pneumonia. Does not appear hypervolemic. 5.  Morbid obesity, BMI 50.6 kg per metered square     6.   Edema/anasarca, chronic, multifactorial, due to venous insufficiency in the setting of morbid obesity, relative immobility, likely diastolic dysfunction though it was \"indeterminate\" on echo, the does have pulmonary hypertension, mild right-sided heart failure     Recommendations    Still look hypervolemic   Azotemia stable    Continue iv diuresis as ordered lasix 60 iv q 8hr  for 3 more doses  Fu serial bmps UO        Electronically signed by Lesia Monreal MD on 1/23/2023

## 2023-01-23 NOTE — PLAN OF CARE
Problem: Discharge Planning  Goal: Discharge to home or other facility with appropriate resources  Outcome: Progressing  Flowsheets (Taken 1/23/2023 0815)  Discharge to home or other facility with appropriate resources: Identify barriers to discharge with patient and caregiver     Problem: Respiratory - Adult  Goal: Achieves optimal ventilation and oxygenation  Outcome: Progressing  Flowsheets (Taken 1/23/2023 0815)  Achieves optimal ventilation and oxygenation: Assess for changes in respiratory status     Problem: Cardiovascular - Adult  Goal: Maintains optimal cardiac output and hemodynamic stability  Outcome: Progressing  Flowsheets (Taken 1/23/2023 0815)  Maintains optimal cardiac output and hemodynamic stability: Monitor blood pressure and heart rate  Goal: Absence of cardiac dysrhythmias or at baseline  Outcome: Progressing  Flowsheets (Taken 1/23/2023 0815)  Absence of cardiac dysrhythmias or at baseline: Monitor cardiac rate and rhythm     Problem: Genitourinary - Adult  Goal: Absence of urinary retention  Outcome: Progressing  Flowsheets (Taken 1/23/2023 0815)  Absence of urinary retention: Monitor intake/output and perform bladder scan as needed  Goal: Urinary catheter remains patent  Outcome: Progressing  Flowsheets (Taken 1/23/2023 0815)  Urinary catheter remains patent: Assess patency of urinary catheter     Problem: Chronic Conditions and Co-morbidities  Goal: Patient's chronic conditions and co-morbidity symptoms are monitored and maintained or improved  Outcome: Progressing  Flowsheets (Taken 1/23/2023 0815)  Care Plan - Patient's Chronic Conditions and Co-Morbidity Symptoms are Monitored and Maintained or Improved: Monitor and assess patient's chronic conditions and comorbid symptoms for stability, deterioration, or improvement     Problem: Skin/Tissue Integrity  Goal: Absence of new skin breakdown  Description: 1. Monitor for areas of redness and/or skin breakdown  2.   Assess vascular access sites hourly  3. Every 4-6 hours minimum:  Change oxygen saturation probe site  4. Every 4-6 hours:  If on nasal continuous positive airway pressure, respiratory therapy assess nares and determine need for appliance change or resting period.   Outcome: Progressing     Problem: Safety - Adult  Goal: Free from fall injury  Outcome: Progressing     Problem: ABCDS Injury Assessment  Goal: Absence of physical injury  Outcome: Progressing     Problem: Pain  Goal: Verbalizes/displays adequate comfort level or baseline comfort level  Outcome: Progressing  Flowsheets (Taken 1/23/2023 0755)  Verbalizes/displays adequate comfort level or baseline comfort level: Encourage patient to monitor pain and request assistance     Problem: Nutrition Deficit:  Goal: Optimize nutritional status  Outcome: Progressing

## 2023-01-23 NOTE — PLAN OF CARE
Patient's chart updated to reflect:      .    HF discharge instructions.  -Orders: 2 gram sodium diet, daily weights, I/O.  -PCP and cardiology follow up appointments to be scheduled within 7 days of hospital discharge. -CHF education session will be provided to the patient prior to hospital discharge.     Charlene Pisano, RN MSN,RN  Heart Failure Navigator;

## 2023-01-23 NOTE — PROGRESS NOTES
NAME: Beatriz Carpenter  MR:  [id number]  :   1950  Admit Date:  2023      This is a face to face encounter with [facility] [place] of this note, including Diagnosis,  Interval History, Past Medical/Surgical/Family/Social Histories, ROS, physical exam, and Assessment and Plan were copied and pasted from Previous. Updates have been made where noted and reflect current exam and medical decision making from the DOS of this encounter. CHIEF COMPLAINT     ID following for   Chief Complaint   Patient presents with    Respiratory Distress     Normally on 3L NC, came in on CPAP, given total of 50mcg push dose epi for low BP by EMS     HISTORY OF PRESENT ILLNESS     Beatriz Carpenter is a 67 y.o. female who presents with   Chief Complaint   Patient presents with    Respiratory Distress     Normally on 3L NC, came in on CPAP, given total of 50mcg push dose epi for low BP by EMS     2023 and has  has a past medical history of A-fib ([address] Acute on chronic congestive heart failure ([address] Anxiety, Asthma, CAD (coronary artery disease), Cancer ([address] Chronic kidney disease, COPD exacerbation ([address] Depression, Diabetes mellitus ([address] H/O mammogram, Hx MRSA infection, Hyperlipidemia, Hypertension, Lateral epicondylitis, Morbid obesity ([address] SERENA on CPAP, Oxygen dependent, Parkinson's disease ([address] and Tubal ligation status. Pt seen and examined 23  In bed   Has no new c/o looks comfortable   Afebrile  40% /5L       Patient is tolerating medications. No reported adverse drug reactions.   Available labs, imaging studies, microbiologic studies have been reviewed        Assessment & Plan   Pt was admitted with   NSTEMI (non-ST elevated myocardial infarction) ([address] [I21.4]  Atrial fibrillation with rapid ventricular response (HCC) [I48.91]  Recurrent right pleural effusion [J90]  Hypotension, unspecified hypotension type [I95.9]  Acute on chronic respiratory failure with hypoxia and hypercapnia (HCC) [J96.21, J96.22]    ID following for   Hypothermia better   Vre uti   left le cons colonized  Right pleural effusion with bibasilar atelectasis. Encourage IS  Cont atbx    /71   Pulse (!) 103   Temp 98.4 °F (36.9 °C) (Oral)   Resp 18   Ht 5' (1.524 m)   Wt 273 lb 5 oz (124 kg)   SpO2 90%   BMI 53.38 kg/m²   Temp  Av.7 °F (36.5 °C)  Min: 96.8 °F (36 °C)  Max: 98.4 °F (36.9 °C)  CONSTITUTIONAL:  no apparent distress,   ENT:  Normocephalic, atraumatic,     LUNGS:  No increased work of breathing,  dec  to auscultation bilaterally, no crackles or wheezing on o2   CARDIOVASCULAR:    regular rate and rhythm, normal S1 and S2,     ABDOMEN:  normal bowel sounds, soft, distended, non-tender  MUSCULOSKELETAL:   swelling  BLE. Dec  range of motion   edema   Diaz   NEUROLOGIC:  Awake, alert, oriented. SKIN:  normal skin color, texture, turgor and no rashes bruises  Lines:          Peripheral Intravenous Line:  Peripheral IV 23 Left Antecubital (Active)   Site Assessment Clean, dry & intact 23   Line Status Flushed;Blood return noted;Capped;Normal saline locked 23   Line Care Connections checked and tightened; Chlorhexidine wipes; Line pulled back;Ports disinfected;Cap changed 23   Phlebitis Assessment No symptoms 23   Infiltration Assessment 0 23   Alcohol Cap Used No 23   Dressing Status Clean, dry & intact 23   Dressing Type Transparent 23   Dressing Intervention Other (Comment) 23       Peripheral IV 23 Left;Ventral Forearm (Active)   Site Assessment Clean, dry & intact 23   Line Status Flushed;Brisk blood return; Infusing;Capped 23   Line Care Connections checked and tightened; Chlorhexidine wipes; Line pulled back;Ports disinfected;Cap changed 23   Phlebitis Assessment No symptoms 23   Infiltration Assessment 0 23 Alcohol Cap Used Yes 01/20/23 2000   Dressing Status Clean, dry & intact 01/20/23 2000   Dressing Type Transparent 01/20/23 2000   Dressing Intervention Other (Comment) 01/20/23 2000        Drain(s):  Urinary Catheter 01/18/23 Diaz-Temperature (Active)   $ Urethral catheter insertion $ Not inserted for procedure 01/18/23 0319   Catheter Indications Need for fluid volume management of the critically ill patient in a critical care setting 01/21/23 0124   Site Assessment Pink;Moist;No urethral drainage 01/21/23 0124   Urine Color Yellow 01/21/23 0124   Urine Appearance Cloudy 01/21/23 0124   Collection Container Standard 01/21/23 0124   Securement Method Securing device (Describe) 01/21/23 0124   Catheter Care Completed Yes 01/20/23 2000   Catheter Best Practices  Drainage tube clipped to bed;Catheter secured to thigh; Tamper seal intact; Bag below bladder;Bag not on floor; Lack of dependent loop in tubing;Drainage bag less than half full 01/21/23 0124   Status Draining;Patent 01/21/23 0124   Output (mL) 250 mL 01/20/23 1400        Microbiology:   No results for input(s): COVID19 in the last 72 hours.   Lab Results   Component Value Date/Time    BC 5 Days no growth 01/17/2023 05:23 PM    BC 5 Days no growth 10/12/2022 04:59 PM    BC 5 Days no growth 10/06/2022 09:47 AM    BLOODCULT2 5 Days no growth 01/17/2023 05:23 PM    BLOODCULT2 5 Days no growth 10/12/2022 04:59 PM    BLOODCULT2 5 Days no growth 10/05/2022 11:19 AM    ORG Enterococcus faecium 01/18/2023 03:26 AM    ORG Staphylococcus epidermidis 01/17/2023 10:10 PM    ORG Escherichia coli 10/05/2022 11:19 AM     CULTURE, RESPIRATORY   Date Value Ref Range Status   07/06/2022 Oral Pharyngeal Shavon absent (A)  Final   07/06/2022 Light growth  Final        WOUND/ABSCESS   Date Value Ref Range Status   01/17/2023 Light growth  Final   07/05/2022 Heavy growth  Final   07/05/2022 Heavy growth  Final     Smear, Respiratory   Date Value Ref Range Status   07/06/2022 Refer to ordered Gram stain for results  Final         Component Value Date/Time    AFBCX No growth after 6 weeks of incubation. 10/18/2022 1106    AFBCX No growth after 6 weeks of incubation. 07/08/2022 1546    FUNGSM No Fungal elements seen 07/08/2022 1546    LABFUNG No growth after 4 weeks of incubation. 07/08/2022 1546       MRSA Culture Only   Date Value Ref Range Status   01/17/2023 Methicillin resistant Staph aureus not isolated  Final       LABS:  Recent Labs     01/22/23  1107   WBC 7.1   RBC 3.31*   HGB 8.2*   HCT 30.8*   MCV 93.1   MCH 24.8*   MCHC 26.6*   RDW 15.9*      MPV 10.8       Recent Labs     01/22/23  1107 01/23/23  0448    146   K 4.6 4.2    104   CO2 34* 34*   BUN 54* 53*   CREATININE 2.1* 2.1*   GLUCOSE 130* 80   CALCIUM 8.7 8.7       Lab Results   Component Value Date    SEDRATE 6 07/06/2022    SEDRATE 15 10/24/2021    SEDRATE 115 (H) 02/03/2021     Lab Results   Component Value Date    CRP 5.0 (H) 07/06/2022    CRP 0.7 (H) 10/25/2021    CRP 13.5 (H) 02/03/2021     Lab Results   Component Value Date    PROCAL 0.33 (H) 01/18/2023    PROCAL 0.24 (H) 07/13/2022    PROCAL 0.22 (H) 07/06/2022          REVIEW OF SYSTEMS     As stated above in the chief complaint, otherwise negative.   CURRENT MEDICATIONS     Current Facility-Administered Medications:     furosemide (LASIX) injection 40 mg, 40 mg, IntraVENous, Q8H, Lainey Solano MD, 40 mg at 01/23/23 0510    linezolid (ZYVOX) tablet 600 mg, 600 mg, Oral, 2 times per day, Felisha Stock MD, 600 mg at 01/22/23 2058    [Held by provider] dilTIAZem (CARDIZEM) tablet 60 mg, 60 mg, Oral, 3 times per day, Felisha Stock MD, 60 mg at 01/20/23 0616    sucralfate (CARAFATE) tablet 1 g, 1 g, Oral, 4x Daily, Dereck Cedeno MD, 1 g at 01/22/23 2057    rOPINIRole (REQUIP) tablet 1 mg, 1 mg, Oral, TID, Dereck Cedeno MD, 1 mg at 01/22/23 2057    atorvastatin (LIPITOR) tablet 20 mg, 20 mg, Oral, Nightly, Dereck Cedeno MD, 20 mg at 01/22/23 2057    ipratropium-albuterol (DUONEB) nebulizer solution 3 mL, 1 vial, Inhalation, 4x Daily, Tarun Dc MD, 3 mL at 01/23/23 0603    insulin glargine (LANTUS) injection vial 13 Units, 13 Units, SubCUTAneous, BID, Tarun Dc MD, 13 Units at 01/22/23 2119    loperamide (IMODIUM) capsule 2 mg, 2 mg, Oral, 4x Daily PRN, Tarun Dc MD    [Held by provider] LORazepam (ATIVAN) tablet 0.5 mg, 0.5 mg, Oral, Nightly, Tarun Dc MD, 0.5 mg at 01/20/23 2317    metoprolol succinate (TOPROL XL) extended release tablet 25 mg, 25 mg, Oral, BID, Tarun Dc MD, 25 mg at 01/22/23 2119    insulin lispro (HUMALOG) injection vial 0-4 Units, 0-4 Units, SubCUTAneous, TID WC, Tarun Dc MD, 1 Units at 01/20/23 1646    insulin lispro (HUMALOG) injection vial 0-4 Units, 0-4 Units, SubCUTAneous, Nightly, Tarun Dc MD, 4 Units at 01/18/23 2158    budesonide (PULMICORT) nebulizer suspension 500 mcg, 0.5 mg, Nebulization, BID, Tarun Dc MD, 500 mcg at 01/23/23 7317    sodium chloride flush 0.9 % injection 10 mL, 10 mL, IntraVENous, 2 times per day, Tarun Dc MD, 10 mL at 01/22/23 2057    sodium chloride flush 0.9 % injection 10 mL, 10 mL, IntraVENous, PRN, Tarun Dc MD    0.9 % sodium chloride infusion, , IntraVENous, PRN, Tarun Dc MD    promethazine (PHENERGAN) tablet 12.5 mg, 12.5 mg, Oral, Q6H PRN **OR** ondansetron (ZOFRAN) injection 4 mg, 4 mg, IntraVENous, Q6H PRN, Tarun Dc MD    polyethylene glycol (GLYCOLAX) packet 17 g, 17 g, Oral, Daily PRN, Tarun Dc MD    acetaminophen (TYLENOL) tablet 650 mg, 650 mg, Oral, Q6H PRN, 650 mg at 01/19/23 1123 **OR** acetaminophen (TYLENOL) suppository 650 mg, 650 mg, Rectal, Q6H PRN, Tarun Dc MD    miconazole (MICOTIN) 2 % powder, , Topical, BID, Parker Alcocer APRN - CNP, Given at 01/22/23 2118    arformoterol tartrate (BROVANA) nebulizer solution 15 mcg, 15 mcg, Nebulization, BID, Parker Alcocer, APRN - CNP, 15 mcg at 01/23/23 0603    pantoprazole (PROTONIX) tablet 40 mg, 40 mg, Oral, QAM AC, Luke Porginski, APRN - CNP, 40 mg at 01/23/23 0510    glucose chewable tablet 16 g, 4 tablet, Oral, PRN, Tenna Dial, APRN - CNP    dextrose bolus 10% 125 mL, 125 mL, IntraVENous, PRN **OR** dextrose bolus 10% 250 mL, 250 mL, IntraVENous, PRN, Tenna Dial, APRN - CNP    glucagon (rDNA) injection 1 mg, 1 mg, SubCUTAneous, PRN, Tenna Dial, APRN - CNP    dextrose 10 % infusion, , IntraVENous, Continuous PRN, Tenna Dial, APRN - CNP    apixaban (ELIQUIS) tablet 5 mg, 5 mg, Oral, BID, Tenna Dial, APRN - CNP, 5 mg at 01/22/23 2058    mirtazapine (REMERON) tablet 7.5 mg, 7.5 mg, Oral, Nightly, Tenna Dial, APRN - CNP, 7.5 mg at 01/22/23 2058    aspirin EC tablet 81 mg, 81 mg, Oral, Daily, Luke Porginski, APRN - CNP, 81 mg at 01/22/23 0856    carbidopa-levodopa (SINEMET CR)  MG per extended release tablet 1 tablet, 1 tablet, Oral, TID, Tenna Dial, APRN - CNP, 1 tablet at 01/22/23 2057    sertraline (ZOLOFT) tablet 50 mg, 50 mg, Oral, Daily, Luke Porginski, APRN - CNP, 50 mg at 01/22/23 0857    montelukast (SINGULAIR) tablet 10 mg, 10 mg, Oral, Nightly, Tenna Dial, APRN - CNP, 10 mg at 01/22/23 2057    [Held by provider] midodrine (PROAMATINE) tablet 5 mg, 5 mg, Oral, TID WC, Tenna Dial, APRN - CNP  DIAGNOSTIC RESULTS   Radiology:  CT ABDOMEN PELVIS WO CONTRAST Additional Contrast? None    Result Date: 1/18/2023  EXAMINATION: CT OF THE ABDOMEN AND PELVIS WITHOUT CONTRAST 1/18/2023 5:47 am TECHNIQUE: CT of the abdomen and pelvis was performed without the administration of intravenous contrast. Multiplanar reformatted images are provided for review. Automated exposure control, iterative reconstruction, and/or weight based adjustment of the mA/kV was utilized to reduce the radiation dose to as low as reasonably achievable.  COMPARISON: October 8, 2022 HISTORY: ORDERING SYSTEM PROVIDED HISTORY: R/O obstructive uropathy TECHNOLOGIST PROVIDED HISTORY: Reason for exam:->R/O obstructive uropathy Additional Contrast?->None FINDINGS: Lower Chest: The heart is enlarged. There is a small pericardial effusion and at least a moderate size right pleural effusion is partially imaged. A trace left pleural effusion is noted. Left lower lobe subsegmental atelectasis is noted. Organs: The gallbladder is surgically absent. The liver has an unremarkable unenhanced appearance. The spleen is enlarged measuring 15.9 cm in craniocaudal dimension. The unenhanced pancreas demonstrates diffuse fatty atrophy. The kidneys are without hydronephrosis or radiopaque calculus. GI/Bowel: No evidence of a bowel obstruction, free air or pneumatosis. Portions of the colon are decompressed, limiting assessment for mucosal based abnormalities. The appendix is not confidently visualized. No obvious pericecal inflammatory changes to suggest acute appendicitis. Pelvis: The urinary bladder is decompressed around a Diaz catheter. A small amount of the intraluminal gas in the urinary bladder may be related to recent instrumentation. A small amount of gas is present in the uterine fundus, apparently within the endometrial canal.  The ovaries are not confidently visualized. There are scattered visible but nonenlarged pelvic lymph nodes. Peritoneum/Retroperitoneum: A small amount of ascites is present, predominantly in a perihepatic distribution. The abdominal aorta is mildly calcified without evidence of aneurysm. No retroperitoneal lymphadenopathy or mass. Bones/Soft Tissues: No acute osseous abnormality or evidence of an aggressive osseous lesion. Anasarca is present throughout the soft tissues. No nephroureterolithiasis or hydronephrosis. Splenomegaly. Small volume of perihepatic ascites. At least moderate-sized partially imaged right pleural effusion with bibasilar atelectasis.  Several locules of gas which appear to be within the endometrium at the level of the uterine fundus, of uncertain etiology or clinical significance. Please correlate clinically. Diffuse anasarca throughout the soft tissues. Cardiomegaly. XR FOOT RIGHT (MIN 3 VIEWS)    Result Date: 1/16/2023  EXAMINATION: THREE XRAY VIEWS OF THE RIGHT FOOT 1/16/2023 12:40 pm COMPARISON: None. HISTORY: ORDERING SYSTEM PROVIDED HISTORY: Evaluate big toe wound TECHNOLOGIST PROVIDED HISTORY: Reason for exam:->Evaluate big toe wound FINDINGS: Soft tissue wound evident at the great toe. No radiographically visible acute osteomyelitis. 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 No radiopaque foreign body or soft tissue emphysema. Plantar heel spur noted. No evident acute osteomyelitis. Great toe soft tissue ulcer. Soft tissue swelling. See above. XR CHEST PORTABLE    Result Date: 1/17/2023  EXAMINATION: ONE XRAY VIEW OF THE CHEST 1/17/2023 5:38 pm COMPARISON: 01/16/2023 HISTORY: ORDERING SYSTEM PROVIDED HISTORY: sob TECHNOLOGIST PROVIDED HISTORY: Reason for exam:->sob FINDINGS: The lungs are without acute focal process. Stable right pleural effusion. No pneumothorax. Stable heart size. The osseous structures are without acute process. Stable right pleural effusion. XR CHEST PORTABLE    Result Date: 1/16/2023  EXAMINATION: ONE XRAY VIEW OF THE CHEST 1/16/2023 8:53 am COMPARISON: 30 December 2022 HISTORY: ORDERING SYSTEM PROVIDED HISTORY: SOB TECHNOLOGIST PROVIDED HISTORY: Reason for exam:->SOB FINDINGS: Stable right pleural effusion with adjacent atelectasis and or infiltrate. Stable cardiomediastinal silhouette. No new abnormal findings. Stable right-sided pleural effusion with adjacent atelectasis and or infiltrate.      XR CHEST 1 VIEW    Result Date: 12/30/2022  EXAMINATION: ONE XRAY VIEW OF THE CHEST 12/30/2022 3:21 pm COMPARISON: 12/19/2022 HISTORY: ORDERING SYSTEM PROVIDED HISTORY: SOB TECHNOLOGIST PROVIDED HISTORY: Reason for exam:->SOB FINDINGS: The heart is enlarged. There is a right pleural effusion right lung base atelectasis. I cannot exclude partial collapse of the right lower lobe. The left lung is clear. There is no pneumothorax. 1. Right pleural effusion right lung base atelectasis. 2. I cannot exclude partial collapse of the right lower lobe 3. Cardiomegaly            Imaging and labs were reviewed per medical records. Thank you for involving me in the care of Beatriz Carpenter I will continue to follow. Please do not hesitate to call [phone] for any questions or concerns.     Electronically signed by Yoselin Arias MD on 1/23/2023 at 8:00 AM

## 2023-01-23 NOTE — CONSULTS
Nutrition Education    Educated on Low Na+ diet, label reading & menu tips. All questions were answered  Learners: Patient  Readiness: Acceptance  Method: Explanation and Handout  Response: Verbalizes Understanding  Contact name and number provided.     See RD complete assessment from 1/20/23    Alis Ferreira, [address] [place] [address]  Contact Number: [phone]

## 2023-01-23 NOTE — CARE COORDINATION
1/23/23 PCU, isolation VRE contact. Bipap- pt request it on very frequently- currently saturating well on 4lnc. Pt from [facility] ([facility] Stevo), however, pt wants more rehab Candy at SNF aware but pt has exhausted her skilled days but they will work with her, needs signed [place] at discharge and rapid covid at discharge. Pt's insurance requires  [employer] transport be arranged through [facility] at discharge [facility]. CM/SS assigned to follow.  Electronically signed by Paulino Enciso RN-BC on 1/23/2023 at 1:32 PM

## 2023-01-23 NOTE — PROGRESS NOTES
Pulmonary/Critical Care Consult Note    CHIEF COMPLAINT: Shortness of breath and hypotension      IMPRESSION/ PLAN:    Acute on chronic hypoxic and hypercapnic respiratory failure  COPD   Obesity induced hypoventilation syndrome  SERENA noncompliant with CPAP  BiPAP qhs    DuJcb and Zachariaha    Stable overall  Likely at baseline (although states SOB today)    Need to make sure has BiPAP machine at SNF in order to prepare for discharge     CHF - Diastolic  Pleural effusions  Very elevated pro-BNP  Diuresis ongoing    UTI with VRE  On Linezolid PO  ID consulted    Severe pulmonary hypertension  Secondary to above      SHANTANU on CKD  Hyperkalemia  Elevated K+ resolved  Close to baseline  Nephrology consultation appreciated   Follow     Chronic atrial fibrillation   Rate controlled  On metoprolol / dilt  Eliquis      Type 2 diabetes   Insulin sliding scale      Anemia  Transfuse for Hgb  < 7    Intertrigo  Wound culture to left lower leg  Miconazole powder to rash on lower abdominal folds twice daily    1/23/2023   Overall stable   No real change    Discharge planning  PT/OT  __________________________________________    Subjective  Breathing at baseline    Events last 24 hr  Continuing to use BiPAP when sleeping       HISTORY OF PRESENT ILLNESS: Patient is a 76-year-old female with history of COPD, CAD, breast cancer, CKD, hypertension, hyperlipidemia, diabetes mellitus, obstructive sleep apnea noncompliant with CPAP, oxygen dependence, and Parkinson's disease. Patient presented to the ED on 1/17/2023 for increased shortness of breath over the past 2 days. Patient placed on BiPAP on presentation to the ED and initial ABG was likely mixed or venous. Repeat ABG on AVAPS showed a pH of 7.257, PCO2 90, PO2 183.4, HCO3 39.2, and SPO2 98.6%. The patient also received 1 L normal saline bolus and midodrine 10 mg p.o. x1 for hypotension which the patient takes at the nursing home 3 times daily.   The patient did respond well to the IV fluids and does not require vasopressor support at this time. Lactic acid normal at 1.7. According to the patient's family she is supposed to wear CPAP at night but is noncompliant. She uses supplemental oxygen as needed. Portable chest x-ray shows stable right pleural effusion and no other acute cardiopulmonary process.     ALLERGY:  Ciprofloxacin, Codeine, and Digoxin and related            MEDICAL HISTORY:  Past Medical History:   Diagnosis Date    A-fib ([facility]     Acute on chronic congestive heart failure (HCC)     Anxiety     Asthma     CAD (coronary artery disease) 01/21/2016    Cancer ([facility]  breast ca 2006    right lumpectomy    Chronic kidney disease     nephrolithiasis    COPD exacerbation ([facility] 10/12/2022    Depression     Diabetes mellitus ([facility]     H/O mammogram     Hx MRSA infection     toe infection january 2012    Hyperlipidemia     Hypertension     Lateral epicondylitis     Morbid obesity (HCC)     SEREAN on CPAP     Oxygen dependent     Parkinson's disease (HCC)     Tubal ligation status        MEDICATIONS:   linezolid  600 mg Oral 2 times per day    [Held by provider] dilTIAZem  60 mg Oral 3 times per day    sucralfate  1 g Oral 4x Daily    rOPINIRole  1 mg Oral TID    atorvastatin  20 mg Oral Nightly    ipratropium-albuterol  1 vial Inhalation 4x Daily    insulin glargine  13 Units SubCUTAneous BID    [Held by provider] LORazepam  0.5 mg Oral Nightly    metoprolol succinate  25 mg Oral BID    insulin lispro  0-4 Units SubCUTAneous TID WC    insulin lispro  0-4 Units SubCUTAneous Nightly    budesonide  0.5 mg Nebulization BID    sodium chloride flush  10 mL IntraVENous 2 times per day    miconazole   Topical BID    arformoterol tartrate  15 mcg Nebulization BID    pantoprazole  40 mg Oral QAM AC    apixaban  5 mg Oral BID    mirtazapine  7.5 mg Oral Nightly    aspirin  81 mg Oral Daily    carbidopa-levodopa  1 tablet Oral TID    sertraline  50 mg Oral Daily    montelukast  10 mg Oral Nightly [Held by provider] midodrine  5 mg Oral TID WC      sodium chloride      dextrose           PHYSICAL EXAM:  Vitals:    01/23/23 0907   BP:    Pulse:    Resp: 22   Temp:    SpO2:      FiO2 : 40 %  O2 Flow Rate (L/min): 5 L/min  O2 Device: PAP (positive airway pressure)    Constitutional: No fever, chills, diaphoresis  HEENT: No head lesions, PERRL, EOMI, mouth without lesions, no nasal lesions, no cervical adenopathy palpated   Respiratory: Lungs with equal breath sounds bilaterally diminished with no  wheezes, no accessory muscle use   CV: Rapid rate and irregular rhythm, no murmurs, JVD, bilateral leg edema  Abdomen: Soft, obese, non tender, + bowel sounds, no lesions   Skin: Adequate turgor, no rash, capillary refill <2 seconds, rash noted to bilateral lower abdominal folds, wound noted to left anterior shin  Extremities: Muscular strength 4/4 in 4 limbs, moves 4 limbs spontaneously, distal pulses present   Neurology: Awake and alert, follows commands, moves 4 limbs on command and spontaneously, equal sensation, no dysmetria, neck is supple, no meningitic signs present.       LABS:  WBC   Date Value Ref Range Status   01/22/2023 7.1 4.5 - 11.5 E9/L Final   01/20/2023 8.9 4.5 - 11.5 E9/L Final   01/19/2023 10.6 4.5 - 11.5 E9/L Final     Hemoglobin   Date Value Ref Range Status   01/22/2023 8.2 (L) 11.5 - 15.5 g/dL Final   01/20/2023 8.0 (L) 11.5 - 15.5 g/dL Final   01/19/2023 7.7 (L) 11.5 - 15.5 g/dL Final     Hematocrit   Date Value Ref Range Status   01/22/2023 30.8 (L) 34.0 - 48.0 % Final   01/20/2023 30.1 (L) 34.0 - 48.0 % Final   01/19/2023 26.5 (L) 34.0 - 48.0 % Final     MCV   Date Value Ref Range Status   01/22/2023 93.1 80.0 - 99.9 fL Final   01/20/2023 95.0 80.0 - 99.9 fL Final   01/19/2023 91.1 80.0 - 99.9 fL Final     Platelets   Date Value Ref Range Status   01/22/2023 189 130 - 450 E9/L Final   01/20/2023 231 130 - 450 E9/L Final   01/19/2023 210 130 - 450 E9/L Final     Sodium   Date Value Ref Range Status   01/23/2023 146 132 - 146 mmol/L Final   01/22/2023 143 132 - 146 mmol/L Final   01/20/2023 141 132 - 146 mmol/L Final     Potassium   Date Value Ref Range Status   01/23/2023 4.2 3.5 - 5.0 mmol/L Final   01/22/2023 4.6 3.5 - 5.0 mmol/L Final     Potassium reflex Magnesium   Date Value Ref Range Status   01/20/2023 4.8 3.5 - 5.0 mmol/L Final   01/19/2023 4.9 3.5 - 5.0 mmol/L Final   01/18/2023 5.2 (H) 3.5 - 5.0 mmol/L Final     Chloride   Date Value Ref Range Status   01/23/2023 104 98 - 107 mmol/L Final   01/22/2023 102 98 - 107 mmol/L Final   01/20/2023 99 98 - 107 mmol/L Final     CO2   Date Value Ref Range Status   01/23/2023 34 (H) 22 - 29 mmol/L Final   01/22/2023 34 (H) 22 - 29 mmol/L Final   01/20/2023 33 (H) 22 - 29 mmol/L Final     BUN   Date Value Ref Range Status   01/23/2023 53 (H) 6 - 23 mg/dL Final   01/22/2023 54 (H) 6 - 23 mg/dL Final   01/20/2023 55 (H) 6 - 23 mg/dL Final     Creatinine   Date Value Ref Range Status   01/23/2023 2.1 (H) 0.5 - 1.0 mg/dL Final   01/22/2023 2.1 (H) 0.5 - 1.0 mg/dL Final   01/20/2023 2.1 (H) 0.5 - 1.0 mg/dL Final     Glucose   Date Value Ref Range Status   01/23/2023 80 74 - 99 mg/dL Final   01/22/2023 130 (H) 74 - 99 mg/dL Final   01/20/2023 170 (H) 74 - 99 mg/dL Final     Calcium   Date Value Ref Range Status   01/23/2023 8.7 8.6 - 10.2 mg/dL Final   01/22/2023 8.7 8.6 - 10.2 mg/dL Final   01/20/2023 8.3 (L) 8.6 - 10.2 mg/dL Final     Total Protein   Date Value Ref Range Status   01/17/2023 6.1 (L) 6.4 - 8.3 g/dL Final   01/16/2023 6.3 (L) 6.4 - 8.3 g/dL Final   12/30/2022 5.8 (L) 6.4 - 8.3 g/dL Final     Albumin   Date Value Ref Range Status   01/17/2023 3.6 3.5 - 5.2 g/dL Final   01/16/2023 3.8 3.5 - 5.2 g/dL Final   12/30/2022 3.6 3.5 - 5.2 g/dL Final     Total Bilirubin   Date Value Ref Range Status   01/17/2023 0.5 0.0 - 1.2 mg/dL Final   01/16/2023 0.4 0.0 - 1.2 mg/dL Final   12/30/2022 0.4 0.0 - 1.2 mg/dL Final     Alkaline Phosphatase   Date Value Ref Range Status   01/17/2023 98 35 - 104 U/L Final   01/16/2023 96 35 - 104 U/L Final   12/30/2022 94 35 - 104 U/L Final     AST   Date Value Ref Range Status   01/17/2023 9 0 - 31 U/L Final   01/16/2023 10 0 - 31 U/L Final   12/30/2022 11 0 - 31 U/L Final     ALT   Date Value Ref Range Status   01/17/2023 <5 0 - 32 U/L Final   01/16/2023 <5 0 - 32 U/L Final   12/30/2022 <5 0 - 32 U/L Final     Est, Glom Filt Rate   Date Value Ref Range Status   01/23/2023 24 >=60 mL/min/1.73 Final     Comment:     Pediatric calculator link  [employer]. [employer]/professionals/[place]: online travellers [email or web address]_calculatorped  Effective Oct 3, 2022  These results are not intended for use in patients  <25years of age. eGFR results are calculated without  a race factor using the 2021 CKD-EPI equation. Careful  clinical correlation is recommended, particularly when  comparing to results calculated using previous equations. The CKD-EPI equation is less accurate in patients with  extremes of muscle mass, extra-renal metabolism of  creatinine, excessive creatinine ingestion, or following  therapy that affects renal tubular secretion. 01/22/2023 24 >=60 mL/min/1.73 Final     Comment:     Pediatric calculator link  [employer]/professionals[employer]: online travellers [email or web address]_calculatorped  Effective Oct 3, 2022  These results are not intended for use in patients  <25years of age. eGFR results are calculated without  a race factor using the 2021 CKD-EPI equation. Careful  clinical correlation is recommended, particularly when  comparing to results calculated using previous equations. The CKD-EPI equation is less accurate in patients with  extremes of muscle mass, extra-renal metabolism of  creatinine, excessive creatinine ingestion, or following  therapy that affects renal tubular secretion. 01/20/2023 24 >=60 mL/min/1.73 Final     Comment:     Pediatric calculator link  [employer]. [employer]/professionals[employer]: online travellers [email or web address]_calculatorped  Effective Oct 3, 2022  These results are not intended for use in patients  <25years of age. eGFR results are calculated without  a race factor using the 2021 CKD-EPI equation. Careful  clinical correlation is recommended, particularly when  comparing to results calculated using previous equations. The CKD-EPI equation is less accurate in patients with  extremes of muscle mass, extra-renal metabolism of  creatinine, excessive creatinine ingestion, or following  therapy that affects renal tubular secretion. GFR    Date Value Ref Range Status   10/16/2022 >60  Final   10/14/2022 59  Final   10/13/2022 >60  Final     Magnesium   Date Value Ref Range Status   12/30/2022 2.0 1.6 - 2.6 mg/dL Final   10/14/2022 2.1 1.6 - 2.6 mg/dL Final   10/08/2022 1.9 1.6 - 2.6 mg/dL Final     Phosphorus   Date Value Ref Range Status   01/20/2023 4.7 (H) 2.5 - 4.5 mg/dL Final   01/19/2023 4.9 (H) 2.5 - 4.5 mg/dL Final   01/18/2023 5.6 (H) 2.5 - 4.5 mg/dL Final     No results for input(s): PH, PO2, PCO2, HCO3, BE, O2SAT in the last 72 hours. RADIOLOGY:  CT ABDOMEN PELVIS WO CONTRAST Additional Contrast? None   Final Result   No nephroureterolithiasis or hydronephrosis. Splenomegaly. Small volume of perihepatic ascites. At least moderate-sized partially imaged right pleural effusion with   bibasilar atelectasis. Several locules of gas which appear to be within the endometrium at the level   of the uterine fundus, of uncertain etiology or clinical significance. Please correlate clinically. Diffuse anasarca throughout the soft tissues. Cardiomegaly. XR CHEST PORTABLE   Final Result   Stable right pleural effusion.                Electronically signed by Epifanio Yee MD on 1/23/2023 at 2:20 PM

## 2023-01-23 NOTE — CONSULTS
CHF NURSE NAVIGATOR CONSULT NOTE:    Patient currently admitted with diagnosis of Diastolic heart failure. Patient was sleeping with bipap on. Education session deferred at this time. Scheduling with the CHF clinic Yes. - patient is well established at [facility]. See HFU appointments below. Future Appointments   Date Time Provider Prateek Nassar   2/3/2023 11:00 AM [facility] CHF ROOM 1 [id number]   2/7/2023  2:30 PM America Champion MD [facility]       Barriers to Care:  Contributing risk factors for Heart Failure are identified as impairment:  physical: mobility and overwhelmed by complexity of regimen and multiple comorbidities. Discharge disposition unclear, prior to admission pt was at [facility]. Pt's insurance requires [facility] transport be arranged through [address] [place] [address] at discharge [address] [place] [address] Street: [phone]. The patient is ordered:  Diet: ADULT DIET; Regular; 3 carb choices (45 gm/meal); Low Fat/Low Chol/High Fiber/2 gm Na; Moderately Thick (Honey); 1800 ml  ADULT ORAL NUTRITION SUPPLEMENT; Dinner; Other Oral Supplement; Gelatein 20   Sodium controlled diet Yes  Fluid restriction daily ordered (fluid restriction recommended if patient is hyponatremic and/or diuretic is initiated or increased) Yes  FR:   Daily Weights: Patient Vitals for the past 96 hrs (Last 3 readings):   Weight   01/20/23 0600 273 lb 5 oz (124 kg)     I/O:   Intake/Output Summary (Last 24 hours) at 1/23/2023 1130  Last data filed at 1/23/2023 1008  Gross per 24 hour   Intake 180 ml   Output 675 ml   Net -495 ml             Sergio Tong MSN, RN  Heart Failure Navigator     CONGESTIVE HEART FAILURE (CHF) AHA GUIDELINES  (Must be completed for Primary Diagnosis CHF or History of CHF)    Discharge Plan:  I placed the Heart Failure [place] Instructions in patient's discharge instructions. Per Heart Failure [facility], the patient should have a follow-up appointment made within 7 days of discharge.     New Diagnosis No    ECHO Results most recent: 1/18/23 EF 65%  Lab Results   Component Value Date    LVEF 65 01/18/2023                                        Social History     Tobacco Use   Smoking Status Never   Smokeless Tobacco Never        Immunization History   Administered Date(s) Administered    COVID-19, PFIZER GRAY top, DO NOT Dilute, (age 15 y+), IM, 30 mcg/0.3 mL 05/24/2022    Influenza Vaccine, unspecified formulation 10/15/2014    Influenza Virus Vaccine 10/31/2008, 10/05/2011    Influenza, High Dose (Fluzone 65 yrs and older) 09/22/2015, 11/21/2016, 09/19/2017, 09/25/2019    Influenza, Triv, inactivated, subunit, adjuvanted, IM (Fluad 65 yrs and older) 01/03/2019    Pneumococcal Conjugate 13-valent ([id number]) 05/27/2016    Pneumococcal Polysaccharide ([id number]) 12/21/2012, 01/29/2018    Td, unspecified formulation 03/11/2014    Tdap (Boostrix, Adacel) 09/30/2015    Zoster Live (Zostavax) 06/25/2013    Zoster Recombinant (Shingrix) 12/10/2019        HFpEF  Angiotensin-Converting-Enzyme (ACE) inhibitor ordered:  [] Yes  [x] No (specify contraindication):    [] Contraindicated  [] Hypotensive patient who was at immediate risk of cardiogenic shock  [x] Hospitalized patient who experienced marked azotemia  [] Other Contraindications  [] Not Eligible  [] Not Tolerant  [] Patient Reason  [] System Reason  [] Other Reason    Angiotensin II receptor blockers (ARB) ordered:  [] Yes  [x] No (specify contraindication):    [] Contraindicated  [] Hypotensive patient who was at immediate risk of cardiogenic shock  [x] Hospitalized patient who experienced marked azotemia  [] Other Contraindications    ARNI - Angiotensin Receptor Neprilysin Inhibitor ordered:  [] Yes  [x] No (specify contraindication):    [] ACE inhibitor use within the prior 36 hours  [] Allergy  [] Hyperkalemia  [] Hypotension  [x] Renal dysfunction defined as creatinine > 2.5 mg/dL in men or > 2.0 mg/dL in women  [] Other Contraindications  [] Not Eligible  [] Not Tolerant  [] Patient Reason  []System Reason  []Other Reason      Beta Blocker ordered:    [x] Yes Toprol XL  [] No (specify contraindication):    [] Contraindicated  [] Asthma  [] Fluid Overload  [] Low Blood Pressure  [] Patient recently treated with an intravenous positive inotropic agent  [] Other Contraindications  [] Not Eligible  [] Not Tolerant  [] Patient Reason  [] System Reason    SGLT2 Inhibitor ordered:  [] Yes  [x] No (specify contraindication):    [] Contraindicated  [] Patient currently on dialysis  [] Ketoacidosis  [] Known hypersensitivity to the medication  [] Type I diabetes (not approved for use in patients with Type I diabetes due to increased risk of ketoacidosis)  [] Other Contraindications  [] Not Eligible  [] Not Tolerant  [] Patient Reason  [] System Reason  [x] Other Reason    Aldosterone Antagonist ordered:  [] Yes  [x] No (specify contraindication):    [] Contraindicated  [] Allergy due to aldosterone receptor antagonist  [] Hyperkalemia  [x] Renal dysfunction defined as creatinine >2.5 mg/dL in men or >2.0 mg/dL in women.   [] Other contraindications  [] Not Eligible  [] Not Tolerant  [] Patient Reason  [] System Reason  [] Other Reason

## 2023-01-23 NOTE — PROGRESS NOTES
Department of Internal Medicine  General Internal Medicine  Attending Progress Note  Chief Complaint   Patient presents with    Respiratory Distress     Normally on 3L NC, came in on CPAP, given total of 50mcg push dose epi for low BP by EMS     SUBJECTIVE:    Reports that her breathing is improved. She denied chest pain. No nausea or vomiting. She is aware of plans to continue diuresis.     OBJECTIVE      Medications    Current Facility-Administered Medications: furosemide (LASIX) injection 40 mg, 40 mg, IntraVENous, Q8H  linezolid (ZYVOX) tablet 600 mg, 600 mg, Oral, 2 times per day  [Held by provider] dilTIAZem (CARDIZEM) tablet 60 mg, 60 mg, Oral, 3 times per day  sucralfate (CARAFATE) tablet 1 g, 1 g, Oral, 4x Daily  rOPINIRole (REQUIP) tablet 1 mg, 1 mg, Oral, TID  atorvastatin (LIPITOR) tablet 20 mg, 20 mg, Oral, Nightly  ipratropium-albuterol (DUONEB) nebulizer solution 3 mL, 1 vial, Inhalation, 4x Daily  insulin glargine (LANTUS) injection vial 13 Units, 13 Units, SubCUTAneous, BID  loperamide (IMODIUM) capsule 2 mg, 2 mg, Oral, 4x Daily PRN  [Held by provider] LORazepam (ATIVAN) tablet 0.5 mg, 0.5 mg, Oral, Nightly  metoprolol succinate (TOPROL XL) extended release tablet 25 mg, 25 mg, Oral, BID  insulin lispro (HUMALOG) injection vial 0-4 Units, 0-4 Units, SubCUTAneous, TID WC  insulin lispro (HUMALOG) injection vial 0-4 Units, 0-4 Units, SubCUTAneous, Nightly  budesonide (PULMICORT) nebulizer suspension 500 mcg, 0.5 mg, Nebulization, BID  sodium chloride flush 0.9 % injection 10 mL, 10 mL, IntraVENous, 2 times per day  sodium chloride flush 0.9 % injection 10 mL, 10 mL, IntraVENous, PRN  0.9 % sodium chloride infusion, , IntraVENous, PRN  promethazine (PHENERGAN) tablet 12.5 mg, 12.5 mg, Oral, Q6H PRN **OR** ondansetron (ZOFRAN) injection 4 mg, 4 mg, IntraVENous, Q6H PRN  polyethylene glycol (GLYCOLAX) packet 17 g, 17 g, Oral, Daily PRN  acetaminophen (TYLENOL) tablet 650 mg, 650 mg, Oral, Q6H PRN **OR** acetaminophen (TYLENOL) suppository 650 mg, 650 mg, Rectal, Q6H PRN  miconazole (MICOTIN) 2 % powder, , Topical, BID  arformoterol tartrate (BROVANA) nebulizer solution 15 mcg, 15 mcg, Nebulization, BID  pantoprazole (PROTONIX) tablet 40 mg, 40 mg, Oral, QAM AC  glucose chewable tablet 16 g, 4 tablet, Oral, PRN  dextrose bolus 10% 125 mL, 125 mL, IntraVENous, PRN **OR** dextrose bolus 10% 250 mL, 250 mL, IntraVENous, PRN  glucagon (rDNA) injection 1 mg, 1 mg, SubCUTAneous, PRN  dextrose 10 % infusion, , IntraVENous, Continuous PRN  apixaban (ELIQUIS) tablet 5 mg, 5 mg, Oral, BID  mirtazapine (REMERON) tablet 7.5 mg, 7.5 mg, Oral, Nightly  aspirin EC tablet 81 mg, 81 mg, Oral, Daily  carbidopa-levodopa (SINEMET CR)  MG per extended release tablet 1 tablet, 1 tablet, Oral, TID  sertraline (ZOLOFT) tablet 50 mg, 50 mg, Oral, Daily  montelukast (SINGULAIR) tablet 10 mg, 10 mg, Oral, Nightly  [Held by provider] midodrine (PROAMATINE) tablet 5 mg, 5 mg, Oral, TID WC  Physical    VITALS:  /71   Pulse 95   Temp 98.4 °F (36.9 °C) (Oral)   Resp 22   Ht 5' (1.524 m)   Wt 273 lb 5 oz (124 kg)   SpO2 95%   BMI 53.38 kg/m²   CONSTITUTIONAL:  awake, cooperative, and no apparent distress  EYES:  extra-ocular muscles intact  ENT:  normocepalic, without obvious abnormality  NECK:  supple, symmetrical, trachea midline  HEMATOLOGIC/LYMPHATICS:  no cervical lymphadenopathy  LUNGS:  no increased work of breathing, no retractions, and diminished lung sounds  CARDIOVASCULAR:  normal apical pulses and normal S1 and S2  ABDOMEN:  normal bowel sounds, non-distended, and non-tender, diffuse abdominal wall edema  MUSCULOSKELETAL:  bilateral lower extremity edema  NEUROLOGIC:  no focal neuro deficit  SKIN:  no bruising or bleeding  Data    CBC:   Lab Results   Component Value Date/Time    WBC 7.1 01/22/2023 11:07 AM    RBC 3.31 01/22/2023 11:07 AM    HGB 8.2 01/22/2023 11:07 AM    HCT 30.8 01/22/2023 11:07 AM    MCV 93.1 01/22/2023 11:07 AM    MCH 24.8 01/22/2023 11:07 AM    MCHC 26.6 01/22/2023 11:07 AM    RDW 15.9 01/22/2023 11:07 AM     01/22/2023 11:07 AM    MPV 10.8 01/22/2023 11:07 AM     BMP:    Lab Results   Component Value Date/Time     01/23/2023 04:48 AM    K 4.2 01/23/2023 04:48 AM    K 4.8 01/20/2023 05:07 AM     01/23/2023 04:48 AM    CO2 34 01/23/2023 04:48 AM    BUN 53 01/23/2023 04:48 AM    LABALBU 3.6 01/17/2023 05:23 PM    CREATININE 2.1 01/23/2023 04:48 AM    CALCIUM 8.7 01/23/2023 04:48 AM    GFRAA >60 10/16/2022 09:20 AM    LABGLOM 24 01/23/2023 04:48 AM    GLUCOSE 80 01/23/2023 04:48 AM       ASSESSMENT AND PLAN      Acute on chronic respiratory failure with hypoxia and hypercapnia: continue diuresis. Continue Bipap as needed. Monitor intake and output. On lasix 40 mg IV TID. Hyperlipidemia: on statin  DM type 2 in obese patient complicated with CKD: continue current insulin  COPD: not current in exacerbation. Continue current breathing treatment  Hx of parkinson's Disease: continue home meds  CKD vs SHANTANU: overall creatinine is at base line. Continue to monitor  UTI: continue linezolid. Noted VRE. Paroxysmal A. Fib: continue current management. Patient is on eliquix. HR is well controlled.   Depression/anxity: continue home meds

## 2023-01-24 LAB
ANION GAP SERPL CALCULATED.3IONS-SCNC: 12 MMOL/L (ref 7–16)
BUN BLDV-MCNC: 55 MG/DL (ref 6–23)
CALCIUM SERPL-MCNC: 8.8 MG/DL (ref 8.6–10.2)
CHLORIDE BLD-SCNC: 102 MMOL/L (ref 98–107)
CO2: 32 MMOL/L (ref 22–29)
CREAT SERPL-MCNC: 2.2 MG/DL (ref 0.5–1)
GFR SERPL CREATININE-BSD FRML MDRD: 23 ML/MIN/1.73
GLUCOSE BLD-MCNC: 114 MG/DL (ref 74–99)
METER GLUCOSE: 116 MG/DL (ref 74–99)
METER GLUCOSE: 138 MG/DL (ref 74–99)
METER GLUCOSE: 145 MG/DL (ref 74–99)
METER GLUCOSE: 183 MG/DL (ref 74–99)
METER GLUCOSE: 85 MG/DL (ref 74–99)
POTASSIUM SERPL-SCNC: 4 MMOL/L (ref 3.5–5)
SODIUM BLD-SCNC: 146 MMOL/L (ref 132–146)

## 2023-01-24 PROCEDURE — 6370000000 HC RX 637 (ALT 250 FOR IP): Performed by: INTERNAL MEDICINE

## 2023-01-24 PROCEDURE — 97110 THERAPEUTIC EXERCISES: CPT | Performed by: PHYSICAL THERAPIST

## 2023-01-24 PROCEDURE — 2700000000 HC OXYGEN THERAPY PER DAY

## 2023-01-24 PROCEDURE — 6360000002 HC RX W HCPCS: Performed by: INTERNAL MEDICINE

## 2023-01-24 PROCEDURE — 82962 GLUCOSE BLOOD TEST: CPT

## 2023-01-24 PROCEDURE — 6370000000 HC RX 637 (ALT 250 FOR IP): Performed by: NURSE PRACTITIONER

## 2023-01-24 PROCEDURE — 6360000002 HC RX W HCPCS: Performed by: NURSE PRACTITIONER

## 2023-01-24 PROCEDURE — 2060000000 HC ICU INTERMEDIATE R&B

## 2023-01-24 PROCEDURE — 94640 AIRWAY INHALATION TREATMENT: CPT

## 2023-01-24 PROCEDURE — 97530 THERAPEUTIC ACTIVITIES: CPT | Performed by: PHYSICAL THERAPIST

## 2023-01-24 PROCEDURE — 80048 BASIC METABOLIC PNL TOTAL CA: CPT

## 2023-01-24 PROCEDURE — 99232 SBSQ HOSP IP/OBS MODERATE 35: CPT | Performed by: INTERNAL MEDICINE

## 2023-01-24 PROCEDURE — 36415 COLL VENOUS BLD VENIPUNCTURE: CPT

## 2023-01-24 PROCEDURE — 94660 CPAP INITIATION&MGMT: CPT

## 2023-01-24 PROCEDURE — 2580000003 HC RX 258: Performed by: INTERNAL MEDICINE

## 2023-01-24 RX ORDER — METOLAZONE 2.5 MG/1
5 TABLET ORAL DAILY
Status: COMPLETED | OUTPATIENT
Start: 2023-01-24 | End: 2023-01-26

## 2023-01-24 RX ADMIN — FUROSEMIDE 60 MG: 10 INJECTION, SOLUTION INTRAMUSCULAR; INTRAVENOUS at 15:45

## 2023-01-24 RX ADMIN — ARFORMOTEROL TARTRATE 15 MCG: 15 SOLUTION RESPIRATORY (INHALATION) at 06:36

## 2023-01-24 RX ADMIN — CARBIDOPA AND LEVODOPA 1 TABLET: 50; 200 TABLET, EXTENDED RELEASE ORAL at 21:07

## 2023-01-24 RX ADMIN — MIRTAZAPINE 7.5 MG: 15 TABLET, FILM COATED ORAL at 21:06

## 2023-01-24 RX ADMIN — IPRATROPIUM BROMIDE AND ALBUTEROL SULFATE 3 ML: .5; 2.5 SOLUTION RESPIRATORY (INHALATION) at 18:30

## 2023-01-24 RX ADMIN — METOPROLOL SUCCINATE 25 MG: 25 TABLET, FILM COATED, EXTENDED RELEASE ORAL at 09:16

## 2023-01-24 RX ADMIN — LINEZOLID 600 MG: 600 TABLET, FILM COATED ORAL at 09:16

## 2023-01-24 RX ADMIN — APIXABAN 5 MG: 5 TABLET, FILM COATED ORAL at 21:07

## 2023-01-24 RX ADMIN — ROPINIROLE HYDROCHLORIDE 1 MG: 1 TABLET, FILM COATED ORAL at 12:54

## 2023-01-24 RX ADMIN — MONTELUKAST SODIUM 10 MG: 10 TABLET, FILM COATED ORAL at 21:06

## 2023-01-24 RX ADMIN — INSULIN GLARGINE 13 UNITS: 100 INJECTION, SOLUTION SUBCUTANEOUS at 21:07

## 2023-01-24 RX ADMIN — FUROSEMIDE 60 MG: 10 INJECTION, SOLUTION INTRAMUSCULAR; INTRAVENOUS at 09:20

## 2023-01-24 RX ADMIN — BUDESONIDE 500 MCG: 0.5 SUSPENSION RESPIRATORY (INHALATION) at 18:30

## 2023-01-24 RX ADMIN — INSULIN GLARGINE 13 UNITS: 100 INJECTION, SOLUTION SUBCUTANEOUS at 09:26

## 2023-01-24 RX ADMIN — SUCRALFATE 1 G: 1 TABLET ORAL at 12:53

## 2023-01-24 RX ADMIN — SUCRALFATE 1 G: 1 TABLET ORAL at 21:07

## 2023-01-24 RX ADMIN — ACETAMINOPHEN 650 MG: 325 TABLET ORAL at 12:54

## 2023-01-24 RX ADMIN — ASPIRIN 81 MG: 81 TABLET, COATED ORAL at 09:16

## 2023-01-24 RX ADMIN — ARFORMOTEROL TARTRATE 15 MCG: 15 SOLUTION RESPIRATORY (INHALATION) at 18:30

## 2023-01-24 RX ADMIN — IPRATROPIUM BROMIDE AND ALBUTEROL SULFATE 3 ML: .5; 2.5 SOLUTION RESPIRATORY (INHALATION) at 06:36

## 2023-01-24 RX ADMIN — METOLAZONE 5 MG: 2.5 TABLET ORAL at 12:54

## 2023-01-24 RX ADMIN — SUCRALFATE 1 G: 1 TABLET ORAL at 09:16

## 2023-01-24 RX ADMIN — IPRATROPIUM BROMIDE AND ALBUTEROL SULFATE 3 ML: .5; 2.5 SOLUTION RESPIRATORY (INHALATION) at 14:58

## 2023-01-24 RX ADMIN — BUDESONIDE 500 MCG: 0.5 SUSPENSION RESPIRATORY (INHALATION) at 06:36

## 2023-01-24 RX ADMIN — SERTRALINE 50 MG: 50 TABLET, FILM COATED ORAL at 09:16

## 2023-01-24 RX ADMIN — APIXABAN 5 MG: 5 TABLET, FILM COATED ORAL at 09:16

## 2023-01-24 RX ADMIN — CARBIDOPA AND LEVODOPA 1 TABLET: 50; 200 TABLET, EXTENDED RELEASE ORAL at 09:16

## 2023-01-24 RX ADMIN — ROPINIROLE HYDROCHLORIDE 1 MG: 1 TABLET, FILM COATED ORAL at 21:07

## 2023-01-24 RX ADMIN — SUCRALFATE 1 G: 1 TABLET ORAL at 17:55

## 2023-01-24 RX ADMIN — CARBIDOPA AND LEVODOPA 1 TABLET: 50; 200 TABLET, EXTENDED RELEASE ORAL at 15:44

## 2023-01-24 RX ADMIN — ATORVASTATIN CALCIUM 20 MG: 20 TABLET, FILM COATED ORAL at 21:06

## 2023-01-24 RX ADMIN — Medication 10 ML: at 09:20

## 2023-01-24 RX ADMIN — Medication 10 ML: at 21:14

## 2023-01-24 RX ADMIN — ANTI-FUNGAL POWDER MICONAZOLE NITRATE TALC FREE: 1.42 POWDER TOPICAL at 09:25

## 2023-01-24 RX ADMIN — PANTOPRAZOLE SODIUM 40 MG: 40 TABLET, DELAYED RELEASE ORAL at 05:45

## 2023-01-24 RX ADMIN — METOPROLOL SUCCINATE 25 MG: 25 TABLET, FILM COATED, EXTENDED RELEASE ORAL at 21:06

## 2023-01-24 RX ADMIN — ROPINIROLE HYDROCHLORIDE 1 MG: 1 TABLET, FILM COATED ORAL at 09:16

## 2023-01-24 RX ADMIN — ANTI-FUNGAL POWDER MICONAZOLE NITRATE TALC FREE: 1.42 POWDER TOPICAL at 21:12

## 2023-01-24 RX ADMIN — IPRATROPIUM BROMIDE AND ALBUTEROL SULFATE 3 ML: .5; 2.5 SOLUTION RESPIRATORY (INHALATION) at 09:45

## 2023-01-24 RX ADMIN — LINEZOLID 600 MG: 600 TABLET, FILM COATED ORAL at 21:07

## 2023-01-24 ASSESSMENT — PAIN DESCRIPTION - LOCATION: LOCATION: GENERALIZED

## 2023-01-24 ASSESSMENT — PAIN SCALES - GENERAL
PAINLEVEL_OUTOF10: 3
PAINLEVEL_OUTOF10: 0

## 2023-01-24 ASSESSMENT — PAIN SCALES - WONG BAKER
WONGBAKER_NUMERICALRESPONSE: 0
WONGBAKER_NUMERICALRESPONSE: 0

## 2023-01-24 NOTE — PROGRESS NOTES
Department of Internal Medicine  General Internal Medicine  Attending Progress Note  Chief Complaint   Patient presents with    Respiratory Distress     Normally on 3L NC, came in on CPAP, given total of 50mcg push dose epi for low BP by EMS     SUBJECTIVE:    Reports that her breathing is better. Denied fever and chills. Noted improve breathing.  Aware of plans to continue diuresis    OBJECTIVE      Medications    Current Facility-Administered Medications: metOLazone (ZAROXOLYN) tablet 5 mg, 5 mg, Oral, Daily  furosemide (LASIX) injection 60 mg, 60 mg, IntraVENous, Q8H  linezolid (ZYVOX) tablet 600 mg, 600 mg, Oral, 2 times per day  [Held by provider] dilTIAZem (CARDIZEM) tablet 60 mg, 60 mg, Oral, 3 times per day  sucralfate (CARAFATE) tablet 1 g, 1 g, Oral, 4x Daily  rOPINIRole (REQUIP) tablet 1 mg, 1 mg, Oral, TID  atorvastatin (LIPITOR) tablet 20 mg, 20 mg, Oral, Nightly  ipratropium-albuterol (DUONEB) nebulizer solution 3 mL, 1 vial, Inhalation, 4x Daily  insulin glargine (LANTUS) injection vial 13 Units, 13 Units, SubCUTAneous, BID  loperamide (IMODIUM) capsule 2 mg, 2 mg, Oral, 4x Daily PRN  [Held by provider] LORazepam (ATIVAN) tablet 0.5 mg, 0.5 mg, Oral, Nightly  metoprolol succinate (TOPROL XL) extended release tablet 25 mg, 25 mg, Oral, BID  insulin lispro (HUMALOG) injection vial 0-4 Units, 0-4 Units, SubCUTAneous, TID WC  insulin lispro (HUMALOG) injection vial 0-4 Units, 0-4 Units, SubCUTAneous, Nightly  budesonide (PULMICORT) nebulizer suspension 500 mcg, 0.5 mg, Nebulization, BID  sodium chloride flush 0.9 % injection 10 mL, 10 mL, IntraVENous, 2 times per day  sodium chloride flush 0.9 % injection 10 mL, 10 mL, IntraVENous, PRN  0.9 % sodium chloride infusion, , IntraVENous, PRN  promethazine (PHENERGAN) tablet 12.5 mg, 12.5 mg, Oral, Q6H PRN **OR** ondansetron (ZOFRAN) injection 4 mg, 4 mg, IntraVENous, Q6H PRN  polyethylene glycol (GLYCOLAX) packet 17 g, 17 g, Oral, Daily PRN  acetaminophen (TYLENOL) tablet 650 mg, 650 mg, Oral, Q6H PRN **OR** acetaminophen (TYLENOL) suppository 650 mg, 650 mg, Rectal, Q6H PRN  miconazole (MICOTIN) 2 % powder, , Topical, BID  arformoterol tartrate (BROVANA) nebulizer solution 15 mcg, 15 mcg, Nebulization, BID  pantoprazole (PROTONIX) tablet 40 mg, 40 mg, Oral, QAM AC  glucose chewable tablet 16 g, 4 tablet, Oral, PRN  dextrose bolus 10% 125 mL, 125 mL, IntraVENous, PRN **OR** dextrose bolus 10% 250 mL, 250 mL, IntraVENous, PRN  glucagon (rDNA) injection 1 mg, 1 mg, SubCUTAneous, PRN  dextrose 10 % infusion, , IntraVENous, Continuous PRN  apixaban (ELIQUIS) tablet 5 mg, 5 mg, Oral, BID  mirtazapine (REMERON) tablet 7.5 mg, 7.5 mg, Oral, Nightly  aspirin EC tablet 81 mg, 81 mg, Oral, Daily  carbidopa-levodopa (SINEMET CR)  MG per extended release tablet 1 tablet, 1 tablet, Oral, TID  sertraline (ZOLOFT) tablet 50 mg, 50 mg, Oral, Daily  montelukast (SINGULAIR) tablet 10 mg, 10 mg, Oral, Nightly  [Held by provider] midodrine (PROAMATINE) tablet 5 mg, 5 mg, Oral, TID WC  Physical    VITALS:  /70   Pulse (!) 109   Temp 97.8 °F (36.6 °C) (Infrared)   Resp 19   Ht 5' (1.524 m)   Wt 284 lb 6.4 oz (129 kg)   SpO2 94%   BMI 55.54 kg/m²   CONSTITUTIONAL:  awake, cooperative, and no apparent distress  EYES:  extra-ocular muscles intact  ENT:  normocepalic, without obvious abnormality  NECK:  supple, symmetrical, trachea midline  LUNGS:  no increased work of breathing, no retractions, and clear to auscultation  CARDIOVASCULAR:  normal apical pulses and normal S1 and S2  ABDOMEN:  normal bowel sounds, non-distended, and non-tender  MUSCULOSKELETAL:  full range of motion noted  NEUROLOGIC:  Mental Status Exam:  Level of Alertness:   awake  SKIN:  no bruising or bleeding  Data    CBC:   Lab Results   Component Value Date/Time    WBC 7.1 01/22/2023 11:07 AM    RBC 3.31 01/22/2023 11:07 AM    HGB 8.2 01/22/2023 11:07 AM    HCT 30.8 01/22/2023 11:07 AM    MCV 93.1 01/22/2023 11:07 AM    MCH 24.8 01/22/2023 11:07 AM    MCHC 26.6 01/22/2023 11:07 AM    RDW 15.9 01/22/2023 11:07 AM     01/22/2023 11:07 AM    MPV 10.8 01/22/2023 11:07 AM     BMP:    Lab Results   Component Value Date/Time     01/24/2023 08:25 AM    K 4.0 01/24/2023 08:25 AM    K 4.8 01/20/2023 05:07 AM     01/24/2023 08:25 AM    CO2 32 01/24/2023 08:25 AM    BUN 55 01/24/2023 08:25 AM    LABALBU 3.6 01/17/2023 05:23 PM    CREATININE 2.2 01/24/2023 08:25 AM    CALCIUM 8.8 01/24/2023 08:25 AM    GFRAA >60 10/16/2022 09:20 AM    LABGLOM 23 01/24/2023 08:25 AM    GLUCOSE 114 01/24/2023 08:25 AM       ASSESSMENT AND PLAN      Acute on chronic respiratory failure with hypoxia and hypercapnia: continue PRN or nocturnal Bipap. Appreciate cardiology and pulm recommendations  Acute CHF with systolic dysfunction: continue lasix at current dose. Monitor intake and output. Hypertension Essential: continue home medications  CKD Stage 3: continue to monitor  Atrial Fibrillation: good control. Continue home medications  Pleural Effusion: stable  COPD: not in exacerbation  UTI: abx per ID.

## 2023-01-24 NOTE — PROGRESS NOTES
NAME: Grace Dutton  MR:  [id number]  :   1950  Admit Date:  2023      This is a face to face encounter with [facility] of this note, including Diagnosis,  Interval History, Past Medical/Surgical/Family/Social Histories, ROS, physical exam, and Assessment and Plan were copied and pasted from Previous. Updates have been made where noted and reflect current exam and medical decision making from the DOS of this encounter. CHIEF COMPLAINT     ID following for   Chief Complaint   Patient presents with    Respiratory Distress     Normally on 3L NC, came in on CPAP, given total of 50mcg push dose epi for low BP by EMS     HISTORY OF PRESENT ILLNESS     Grace Dutton is a 67 y.o. female who presents with   Chief Complaint   Patient presents with    Respiratory Distress     Normally on 3L NC, came in on CPAP, given total of 50mcg push dose epi for low BP by EMS     2023 and has  has a past medical history of A-fib ([address] Acute on chronic congestive heart failure ([address] Anxiety, Asthma, CAD (coronary artery disease), Cancer ([address] Chronic kidney disease, COPD exacerbation ([address] Depression, Diabetes mellitus ([address] H/O mammogram, Hx MRSA infection, Hyperlipidemia, Hypertension, Lateral epicondylitis, Morbid obesity ([address] SERENA on CPAP, Oxygen dependent, Parkinson's disease ([address] and Tubal ligation status. Pt seen and examined 23  In bed   Has no c/o   No pian no rash   Afebrile 4L   C2.2 has genao    Patient is tolerating medications. No reported adverse drug reactions.   Available labs, imaging studies, microbiologic studies have been reviewed        Assessment & Plan   Pt was admitted with   NSTEMI (non-ST elevated myocardial infarction) ([address] [I21.4]  Atrial fibrillation with rapid ventricular response (HCC) [I48.91]  Recurrent right pleural effusion [J90]  Hypotension, unspecified hypotension type [I95.9]  Acute on chronic respiratory failure with hypoxia and hypercapnia (HCC) [J96.21, J96.22]    ID following for   Hypothermia better   Vre uti day 5 linezolid   left le cons colonized not infected   Right pleural effusion with bibasilar atelectasis. Encourage IS  Cont atbx few more days    /70   Pulse (!) 109   Temp 97.8 °F (36.6 °C) (Infrared)   Resp 19   Ht 5' (1.524 m)   Wt 284 lb 6.4 oz (129 kg)   SpO2 94%   BMI 55.54 kg/m²   Temp  Av.7 °F (36.5 °C)  Min: 97 °F (36.1 °C)  Max: 98.2 °F (36.8 °C)  CONSTITUTIONAL:  no apparent distress,   ENT:  Normocephalic, atraumatic,     LUNGS:  No increased work of breathing,  dec  to auscultation bilaterally, no crackles or wheezing on o2   CARDIOVASCULAR:    regular rate and rhythm, normal S1 and S2,     ABDOMEN:  normal bowel sounds, soft, distended, non-tender  MUSCULOSKELETAL:   swelling  BLE.   edema  left le broken blister healing a expected  Diaz   NEUROLOGIC:  Awake, alert, oriented. SKIN:   no rashes bruises  Lines:          Peripheral Intravenous Line:  Peripheral IV 23 Left Antecubital (Active)   Site Assessment Clean, dry & intact 23   Line Status Flushed;Blood return noted;Capped;Normal saline locked 23   Line Care Connections checked and tightened; Chlorhexidine wipes; Line pulled back;Ports disinfected;Cap changed 23   Phlebitis Assessment No symptoms 23   Infiltration Assessment 0 23   Alcohol Cap Used No 23   Dressing Status Clean, dry & intact 23   Dressing Type Transparent 23   Dressing Intervention Other (Comment) 23       Peripheral IV 23 Left;Ventral Forearm (Active)   Site Assessment Clean, dry & intact 23   Line Status Flushed;Brisk blood return; Infusing;Capped 23   Line Care Connections checked and tightened; Chlorhexidine wipes; Line pulled back;Ports disinfected;Cap changed 23   Phlebitis Assessment No symptoms 23 Infiltration Assessment 0 01/20/23 2000   Alcohol Cap Used Yes 01/20/23 2000   Dressing Status Clean, dry & intact 01/20/23 2000   Dressing Type Transparent 01/20/23 2000   Dressing Intervention Other (Comment) 01/20/23 2000        Drain(s):  Urinary Catheter 01/18/23 Diaz-Temperature (Active)   $ Urethral catheter insertion $ Not inserted for procedure 01/18/23 0319   Catheter Indications Need for fluid volume management of the critically ill patient in a critical care setting 01/21/23 0124   Site Assessment Pink;Moist;No urethral drainage 01/21/23 0124   Urine Color Yellow 01/21/23 0124   Urine Appearance Cloudy 01/21/23 0124   Collection Container Standard 01/21/23 0124   Securement Method Securing device (Describe) 01/21/23 0124   Catheter Care Completed Yes 01/20/23 2000   Catheter Best Practices  Drainage tube clipped to bed;Catheter secured to thigh; Tamper seal intact; Bag below bladder;Bag not on floor; Lack of dependent loop in tubing;Drainage bag less than half full 01/21/23 0124   Status Draining;Patent 01/21/23 0124   Output (mL) 250 mL 01/20/23 1400        Microbiology:   No results for input(s): COVID19 in the last 72 hours.   Lab Results   Component Value Date/Time    BC 5 Days no growth 01/17/2023 05:23 PM    BC 5 Days no growth 10/12/2022 04:59 PM    BC 5 Days no growth 10/06/2022 09:47 AM    BLOODCULT2 5 Days no growth 01/17/2023 05:23 PM    BLOODCULT2 5 Days no growth 10/12/2022 04:59 PM    BLOODCULT2 5 Days no growth 10/05/2022 11:19 AM    ORG Enterococcus faecium 01/18/2023 03:26 AM    ORG Staphylococcus epidermidis 01/17/2023 10:10 PM    ORG Escherichia coli 10/05/2022 11:19 AM     CULTURE, RESPIRATORY   Date Value Ref Range Status   07/06/2022 Oral Pharyngeal Shavon absent (A)  Final   07/06/2022 Light growth  Final        WOUND/ABSCESS   Date Value Ref Range Status   01/17/2023 Light growth  Final   07/05/2022 Heavy growth  Final   07/05/2022 Heavy growth  Final     Smear, Respiratory   Date Value Ref Range Status   07/06/2022 Refer to ordered Gram stain for results  Final         Component Value Date/Time    AFBCX No growth after 6 weeks of incubation. 10/18/2022 1106    AFBCX No growth after 6 weeks of incubation. 07/08/2022 1546    FUNGSM No Fungal elements seen 07/08/2022 1546    LABFUNG No growth after 4 weeks of incubation. 07/08/2022 1546       MRSA Culture Only   Date Value Ref Range Status   01/17/2023 Methicillin resistant Staph aureus not isolated  Final       LABS:  Recent Labs     01/22/23  1107   WBC 7.1   RBC 3.31*   HGB 8.2*   HCT 30.8*   MCV 93.1   MCH 24.8*   MCHC 26.6*   RDW 15.9*      MPV 10.8       Recent Labs     01/22/23  1107 01/23/23  0448 01/24/23  0825    146 146   K 4.6 4.2 4.0    104 102   CO2 34* 34* 32*   BUN 54* 53* 55*   CREATININE 2.1* 2.1* 2.2*   GLUCOSE 130* 80 114*   CALCIUM 8.7 8.7 8.8       Lab Results   Component Value Date    SEDRATE 6 07/06/2022    SEDRATE 15 10/24/2021    SEDRATE 115 (H) 02/03/2021     Lab Results   Component Value Date    CRP 5.0 (H) 07/06/2022    CRP 0.7 (H) 10/25/2021    CRP 13.5 (H) 02/03/2021     Lab Results   Component Value Date    PROCAL 0.33 (H) 01/18/2023    PROCAL 0.24 (H) 07/13/2022    PROCAL 0.22 (H) 07/06/2022          REVIEW OF SYSTEMS     As stated above in the chief complaint, otherwise negative.   CURRENT MEDICATIONS     Current Facility-Administered Medications:     metOLazone (ZAROXOLYN) tablet 5 mg, 5 mg, Oral, Daily, Lesia Monreal MD    furosemide (LASIX) injection 60 mg, 60 mg, IntraVENous, Q8H, Lesia Monreal MD, 60 mg at 01/24/23 0920    linezolid (ZYVOX) tablet 600 mg, 600 mg, Oral, 2 times per day, Meg Muir MD, 600 mg at 01/24/23 0916    [Held by provider] dilTIAZem (CARDIZEM) tablet 60 mg, 60 mg, Oral, 3 times per day, Meg Muir MD, 60 mg at 01/20/23 0616    sucralfate (CARAFATE) tablet 1 g, 1 g, Oral, 4x Daily, Shaggy Yatse MD, 1 g at 01/24/23 0916    rOPINIRole (REQUIP) tablet 1 mg, 1 mg, Oral, TID, Brandie Lopez MD, 1 mg at 01/24/23 0916    atorvastatin (LIPITOR) tablet 20 mg, 20 mg, Oral, Nightly, Brandie Lopez MD, 20 mg at 01/23/23 2046    ipratropium-albuterol (DUONEB) nebulizer solution 3 mL, 1 vial, Inhalation, 4x Daily, Brandie Lopez MD, 3 mL at 01/24/23 0945    insulin glargine (LANTUS) injection vial 13 Units, 13 Units, SubCUTAneous, BID, Brandie Lopez MD, 13 Units at 01/24/23 0299    loperamide (IMODIUM) capsule 2 mg, 2 mg, Oral, 4x Daily PRN, Brandie Lopez MD    [Held by provider] LORazepam (ATIVAN) tablet 0.5 mg, 0.5 mg, Oral, Nightly, Brandie Lopez MD, 0.5 mg at 01/20/23 2317    metoprolol succinate (TOPROL XL) extended release tablet 25 mg, 25 mg, Oral, BID, Brandie Lopez MD, 25 mg at 01/24/23 0916    insulin lispro (HUMALOG) injection vial 0-4 Units, 0-4 Units, SubCUTAneous, TID WC, Brandie Lopez MD, 1 Units at 01/20/23 1646    insulin lispro (HUMALOG) injection vial 0-4 Units, 0-4 Units, SubCUTAneous, Nightly, Brandie Lopez MD, 4 Units at 01/18/23 2158    budesonide (PULMICORT) nebulizer suspension 500 mcg, 0.5 mg, Nebulization, BID, Brandie Lopez MD, 500 mcg at 01/24/23 0636    sodium chloride flush 0.9 % injection 10 mL, 10 mL, IntraVENous, 2 times per day, Brandie Lopez MD, 10 mL at 01/24/23 0920    sodium chloride flush 0.9 % injection 10 mL, 10 mL, IntraVENous, PRN, Brandie Lopez MD    0.9 % sodium chloride infusion, , IntraVENous, PRN, Brandie Lopez MD    promethazine (PHENERGAN) tablet 12.5 mg, 12.5 mg, Oral, Q6H PRN **OR** ondansetron (ZOFRAN) injection 4 mg, 4 mg, IntraVENous, Q6H PRN, Brandie Lopez MD    polyethylene glycol (GLYCOLAX) packet 17 g, 17 g, Oral, Daily PRN, Brandie Lopez MD    acetaminophen (TYLENOL) tablet 650 mg, 650 mg, Oral, Q6H PRN, 650 mg at 01/23/23 1204 **OR** acetaminophen (TYLENOL) suppository 650 mg, 650 mg, Rectal, Q6H PRN, Brandie Lopez MD    miconazole (MICOTIN) 2 % powder, , Topical, BID, Dian Hale, APRN - CNP, Given at 01/24/23 0925    arformoterol tartrate (BROVANA) nebulizer solution 15 mcg, 15 mcg, Nebulization, BID, Ulysses Wray, APRN - CNP, 15 mcg at 01/24/23 0636    pantoprazole (PROTONIX) tablet 40 mg, 40 mg, Oral, QAM AC, Luke Porginski, APRN - CNP, 40 mg at 01/24/23 0545    glucose chewable tablet 16 g, 4 tablet, Oral, PRN, Ulysses Chime, APRN - CNP    dextrose bolus 10% 125 mL, 125 mL, IntraVENous, PRN **OR** dextrose bolus 10% 250 mL, 250 mL, IntraVENous, PRN, Ulysses Chimluis, APRN - CNP    glucagon (rDNA) injection 1 mg, 1 mg, SubCUTAneous, PRN, Ulysses Chimluis, APRN - CNP    dextrose 10 % infusion, , IntraVENous, Continuous PRN, Ulysses Chimluis, APRN - CNP    apixaban (ELIQUIS) tablet 5 mg, 5 mg, Oral, BID, Ulysses Wray, APRN - CNP, 5 mg at 01/24/23 0916    mirtazapine (REMERON) tablet 7.5 mg, 7.5 mg, Oral, Nightly, Ulysses Wray, APRN - CNP, 7.5 mg at 01/23/23 2046    aspirin EC tablet 81 mg, 81 mg, Oral, Daily, Aaron Conroyginski, APRN - CNP, 81 mg at 01/24/23 0916    carbidopa-levodopa (SINEMET CR)  MG per extended release tablet 1 tablet, 1 tablet, Oral, TID, Ulysses Wray APRN - CNP, 1 tablet at 01/24/23 0916    sertraline (ZOLOFT) tablet 50 mg, 50 mg, Oral, Daily, Ulysses Wray, APRN - CNP, 50 mg at 01/24/23 0916    montelukast (SINGULAIR) tablet 10 mg, 10 mg, Oral, Nightly, Ulysses Chime, APRN - CNP, 10 mg at 01/23/23 2046    [Held by provider] midodrine (PROAMATINE) tablet 5 mg, 5 mg, Oral, TID WC, Ulysses Wray, APRN - CNP  DIAGNOSTIC RESULTS   Radiology:  CT ABDOMEN PELVIS WO CONTRAST Additional Contrast? None    Result Date: 1/18/2023  EXAMINATION: CT OF THE ABDOMEN AND PELVIS WITHOUT CONTRAST 1/18/2023 5:47 am TECHNIQUE: CT of the abdomen and pelvis was performed without the administration of intravenous contrast. Multiplanar reformatted images are provided for review.  Automated exposure control, iterative reconstruction, and/or weight based adjustment of the mA/kV was utilized to reduce the radiation dose to as low as reasonably achievable. COMPARISON: October 8, 2022 HISTORY: ORDERING SYSTEM PROVIDED HISTORY: R/O obstructive uropathy TECHNOLOGIST PROVIDED HISTORY: Reason for exam:->R/O obstructive uropathy Additional Contrast?->None FINDINGS: Lower Chest: The heart is enlarged. There is a small pericardial effusion and at least a moderate size right pleural effusion is partially imaged. A trace left pleural effusion is noted. Left lower lobe subsegmental atelectasis is noted. Organs: The gallbladder is surgically absent. The liver has an unremarkable unenhanced appearance. The spleen is enlarged measuring 15.9 cm in craniocaudal dimension. The unenhanced pancreas demonstrates diffuse fatty atrophy. The kidneys are without hydronephrosis or radiopaque calculus. GI/Bowel: No evidence of a bowel obstruction, free air or pneumatosis. Portions of the colon are decompressed, limiting assessment for mucosal based abnormalities. The appendix is not confidently visualized. No obvious pericecal inflammatory changes to suggest acute appendicitis. Pelvis: The urinary bladder is decompressed around a Diaz catheter. A small amount of the intraluminal gas in the urinary bladder may be related to recent instrumentation. A small amount of gas is present in the uterine fundus, apparently within the endometrial canal.  The ovaries are not confidently visualized. There are scattered visible but nonenlarged pelvic lymph nodes. Peritoneum/Retroperitoneum: A small amount of ascites is present, predominantly in a perihepatic distribution. The abdominal aorta is mildly calcified without evidence of aneurysm. No retroperitoneal lymphadenopathy or mass. Bones/Soft Tissues: No acute osseous abnormality or evidence of an aggressive osseous lesion. Anasarca is present throughout the soft tissues. No nephroureterolithiasis or hydronephrosis. Splenomegaly.  Small volume of perihepatic ascites. At least moderate-sized partially imaged right pleural effusion with bibasilar atelectasis. Several locules of gas which appear to be within the endometrium at the level of the uterine fundus, of uncertain etiology or clinical significance. Please correlate clinically. Diffuse anasarca throughout the soft tissues. Cardiomegaly. XR FOOT RIGHT (MIN 3 VIEWS)    Result Date: 1/16/2023  EXAMINATION: THREE XRAY VIEWS OF THE RIGHT FOOT 1/16/2023 12:40 pm COMPARISON: None. HISTORY: ORDERING SYSTEM PROVIDED HISTORY: Evaluate big toe wound TECHNOLOGIST PROVIDED HISTORY: Reason for exam:->Evaluate big toe wound FINDINGS: Soft tissue wound evident at the great toe. No radiographically visible acute osteomyelitis. 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 No radiopaque foreign body or soft tissue emphysema. Plantar heel spur noted. No evident acute osteomyelitis. Great toe soft tissue ulcer. Soft tissue swelling. See above. XR CHEST PORTABLE    Result Date: 1/17/2023  EXAMINATION: ONE XRAY VIEW OF THE CHEST 1/17/2023 5:38 pm COMPARISON: 01/16/2023 HISTORY: ORDERING SYSTEM PROVIDED HISTORY: sob TECHNOLOGIST PROVIDED HISTORY: Reason for exam:->sob FINDINGS: The lungs are without acute focal process. Stable right pleural effusion. No pneumothorax. Stable heart size. The osseous structures are without acute process. Stable right pleural effusion. XR CHEST PORTABLE    Result Date: 1/16/2023  EXAMINATION: ONE XRAY VIEW OF THE CHEST 1/16/2023 8:53 am COMPARISON: 30 December 2022 HISTORY: ORDERING SYSTEM PROVIDED HISTORY: SOB TECHNOLOGIST PROVIDED HISTORY: Reason for exam:->SOB FINDINGS: Stable right pleural effusion with adjacent atelectasis and or infiltrate. Stable cardiomediastinal silhouette. No new abnormal findings. Stable right-sided pleural effusion with adjacent atelectasis and or infiltrate.      XR CHEST 1 VIEW    Result Date: 12/30/2022  EXAMINATION: ONE XRAY VIEW OF THE CHEST 12/30/2022 3:21 pm COMPARISON: 12/19/2022 HISTORY: ORDERING SYSTEM PROVIDED HISTORY: SOB TECHNOLOGIST PROVIDED HISTORY: Reason for exam:->SOB FINDINGS: The heart is enlarged. There is a right pleural effusion right lung base atelectasis. I cannot exclude partial collapse of the right lower lobe. The left lung is clear. There is no pneumothorax. 1. Right pleural effusion right lung base atelectasis. 2. I cannot exclude partial collapse of the right lower lobe 3. Cardiomegaly            Imaging and labs were reviewed per medical records. Thank you for involving me in the care of Dena Severin I will continue to follow. Please do not hesitate to call [phone] for any questions or concerns.     Electronically signed by Elizabeth Rivas MD on 1/24/2023 at 12:37 PM

## 2023-01-24 NOTE — CARE COORDINATION
SS NOTE: COVID NEGATIVE 1/17, PT WILL NEED A RAPID NEGATIVE COVID TEST TO RETURN TO [facility]. Pt is on 4 liters of O2 and occasionally in need of the bipap. Pt has positive blood cultures. Pt has a peripheral line. She is on IV Lasix and oral Zyvox. She has a genao. She has a wound on her left lower leg. Pt is going to return to long term care at [address] [facility] when dched. Pt is out of skilled days there and they will activate Medicaid for return there. Pamela Rollins will need called if pt need a [facility] transport to return to [address] [facility] [facility]. For the return pt will need NO PRECERT, and will need a signed [place]. Pt agrees with this plan. SS to continue. ESTHER Turpin.1/24/2023.11:49 AM.

## 2023-01-24 NOTE — PROGRESS NOTES
[facility] MD Toby Jerome MD Louise Mussel, MD Mar Ivanoff, BRADLEY Devlin, NUNU Matos, BRADLEY  Progress Note    1/24/2023    SUBJECTIVE:   1/20: Feeling little better today. Denies new complaint. Still tachypnea, though denies dyspnea no cp/palp Appetite ok ROS otherwise unremarkable    1//21 seen in her room on o2/nc bp stable weak poor po intake   2+ edema in LE     1/22 charts reviewed . On bipap    1/23 : on o2/nc . Still look hypervolemic     1/24 seen in her room comfortable o2/nc . Still with LE edema which seems to be multifactorial and will likely need to accept having some edema on board     PROBLEM LIST:    Principal Problem:    Acute on chronic respiratory failure with hypoxia and hypercapnia (HCC)  Resolved Problems:    * No resolved hospital problems. *         DIET:    ADULT DIET; Regular; 3 carb choices (45 gm/meal); Low Fat/Low Chol/High Fiber/2 gm Na; Moderately Thick (Honey); 1800 ml  ADULT ORAL NUTRITION SUPPLEMENT; Dinner;  Other Oral Supplement; Gelatein 20     MEDS (scheduled):    metOLazone  5 mg Oral Daily    furosemide  60 mg IntraVENous Q8H    linezolid  600 mg Oral 2 times per day    [Held by provider] dilTIAZem  60 mg Oral 3 times per day    sucralfate  1 g Oral 4x Daily    rOPINIRole  1 mg Oral TID    atorvastatin  20 mg Oral Nightly    ipratropium-albuterol  1 vial Inhalation 4x Daily    insulin glargine  13 Units SubCUTAneous BID    [Held by provider] LORazepam  0.5 mg Oral Nightly    metoprolol succinate  25 mg Oral BID    insulin lispro  0-4 Units SubCUTAneous TID WC    insulin lispro  0-4 Units SubCUTAneous Nightly    budesonide  0.5 mg Nebulization BID    sodium chloride flush  10 mL IntraVENous 2 times per day    miconazole   Topical BID    arformoterol tartrate  15 mcg Nebulization BID    pantoprazole  40 mg Oral QAM AC    apixaban  5 mg Oral BID    mirtazapine  7.5 mg Oral Nightly    aspirin  81 mg Oral Daily carbidopa-levodopa  1 tablet Oral TID    sertraline  50 mg Oral Daily    montelukast  10 mg Oral Nightly    [Held by provider] midodrine  5 mg Oral TID WC       MEDS (infusions):   sodium chloride      dextrose         MEDS (prn):  loperamide, sodium chloride flush, sodium chloride, promethazine **OR** ondansetron, polyethylene glycol, acetaminophen **OR** acetaminophen, glucose, dextrose bolus **OR** dextrose bolus, glucagon (rDNA), dextrose    PHYSICAL EXAM:     Patient Vitals for the past 24 hrs:   BP Temp Temp src Pulse Resp SpO2 Weight   01/24/23 0800 113/70 97.8 °F (36.6 °C) Infrared (!) 109 19 94 % --   01/24/23 0057 -- -- -- -- -- -- 284 lb 6.4 oz (129 kg)   01/23/23 2339 107/60 97 °F (36.1 °C) Axillary 90 22 96 % --   01/23/23 2236 -- -- -- -- 22 -- --   01/23/23 2155 -- -- -- 94 -- -- --   01/23/23 2000 119/64 97.8 °F (36.6 °C) Oral 76 19 92 % --   01/23/23 1545 115/77 98.2 °F (36.8 °C) Infrared (!) 110 23 94 % --   01/23/23 1234 -- -- -- -- -- 98 % --     @      Intake/Output Summary (Last 24 hours) at 1/24/2023 1125  Last data filed at 1/24/2023 5036  Gross per 24 hour   Intake 938.64 ml   Output 2000 ml   Net -1061.36 ml           Wt Readings from Last 3 Encounters:   01/24/23 284 lb 6.4 oz (129 kg)   01/16/23 259 lb (117.5 kg)   01/16/23 259 lb (117.5 kg)     Age-appropriate morbidly obese white woman, though easily arousable, no apparent distress  NC/AT EOMI sclera conjunctive are clear and anicteric mucous membranes are moist  Neck soft supple trachea midline  Coarse breath sounds with crackles on the right, mildly prolonged expiratory phase but no wheeze. Arguably a little more clear than yesterday  Regular rhythm normal S1 and S2  Abdomen soft nontender nondistended normal active bowel sounds.   Abdominal pannus has substantial edema  Distal extremities, thighs, sacrum have substantial chronic type nonpitting edema, though no pitting edema  Pulses 1+ x4  Moves all 4 extremities  Cranial nerves II through XII grossly intact  Awake, alert, interactive and appropriate  Skin tone consistent with chronic venous stasis distally      DATA:    Recent Labs     01/22/23  1107   WBC 7.1   HGB 8.2*   HCT 30.8*   MCV 93.1          Recent Labs     01/22/23  1107 01/23/23  0448 01/24/23  0825    146 146   K 4.6 4.2 4.0    104 102   CO2 34* 34* 32*   BUN 54* 53* 55*   CREATININE 2.1* 2.1* 2.2*         Lab Results   Component Value Date    LABPROT 0.5 (H) 01/18/2023    LABPROT 0.5 01/18/2023     On CT no hydro/signs of obstruction     Urine studies  U Na 21, U Cl 23 U prot:cr ratio 0.5    ASSESSMENT / RECOMMENDATIONS:       Assessment  1. Acute kidney injury urine lytes support decrease effective volume     2. CKD stage III, Baseline creatinine 1.4 to 1.7 mg/dL, secondary to diabetic nephropathy and renal microvascular atherosclerotic disease  0.5 g proteinuria     3. Anemia due to CKD, phlebotomy associated prolonged hospitalization     4. Acute hypercapnic and hypoxic respiratory failure due to COPD exacerbation and pneumonia. Does not appear hypervolemic. 5.  Morbid obesity, BMI 50.6 kg per metered square     6.   Edema/anasarca, chronic, multifactorial, due to venous insufficiency in the setting of morbid obesity, relative immobility, likely diastolic dysfunction though it was \"indeterminate\" on echo, the does have pulmonary hypertension, mild right-sided heart failure     Recommendations    Still look hypervolemic   Azotemia relatively stable    Continue iv diuresis as ordered lasix 60 iv q 8hr  for 3 more doses  Metolazone for few doses   Compression stocking   Out of bed to chair   Fu serial bmps UO        Electronically signed by Hermelindo Rojas MD on 1/24/2023

## 2023-01-24 NOTE — PLAN OF CARE
Problem: Discharge Planning  Goal: Discharge to home or other facility with appropriate resources  Outcome: Progressing     Problem: Respiratory - Adult  Goal: Achieves optimal ventilation and oxygenation  Outcome: Progressing     Problem: Genitourinary - Adult  Goal: Urinary catheter remains patent  Outcome: Progressing     Problem: Safety - Adult  Goal: Free from fall injury  Outcome: Progressing     Problem: ABCDS Injury Assessment  Goal: Absence of physical injury  Outcome: Progressing     Problem: Pain  Goal: Verbalizes/displays adequate comfort level or baseline comfort level  Outcome: Progressing  Flowsheets (Taken 1/23/2023 1545 by Kelsie Quiñones RN)  Verbalizes/displays adequate comfort level or baseline comfort level: Encourage patient to monitor pain and request assistance

## 2023-01-24 NOTE — PROGRESS NOTES
Pulmonary/Critical Care Consult Note    CHIEF COMPLAINT: Shortness of breath and hypotension      IMPRESSION/ PLAN:    Acute on chronic hypoxic and hypercapnic respiratory failure  COPD   Obesity induced hypoventilation syndrome  SERENA noncompliant with CPAP  BiPAP qhs    [employer] and Zachariaha    Stable overall  Likely at baseline (although states SOB today)    Need to make sure has BiPAP machine at SNF in order to prepare for discharge     CHF - Diastolic  Pleural effusions  Very elevated pro-BNP  Diuresis ongoing    UTI with VRE  On Linezolid PO  ID consulted    Severe pulmonary hypertension  Secondary to above      SHANTANU on CKD  Hyperkalemia  Elevated K+ resolved  Close to baseline  Nephrology consultation appreciated   Follow     Chronic atrial fibrillation   Rate controlled  On metoprolol / dilt  Eliquis      Type 2 diabetes   Insulin sliding scale      Anemia  Transfuse for Hgb  < 7    Intertrigo  Wound culture to left lower leg  Miconazole powder to rash on lower abdominal folds twice daily    1/24/2023   Overall stable   No real change    Discharge planning  PT/OT  __________________________________________    Subjective  Breathing at baseline    Events last 24 hr  Continuing to use BiPAP when sleeping       HISTORY OF PRESENT ILLNESS: Patient is a 76-year-old female with history of COPD, CAD, breast cancer, CKD, hypertension, hyperlipidemia, diabetes mellitus, obstructive sleep apnea noncompliant with CPAP, oxygen dependence, and Parkinson's disease. Patient presented to the ED on 1/17/2023 for increased shortness of breath over the past 2 days. Patient placed on BiPAP on presentation to the ED and initial ABG was likely mixed or venous. Repeat ABG on AVAPS showed a pH of 7.257, PCO2 90, PO2 183.4, HCO3 39.2, and SPO2 98.6%. The patient also received 1 L normal saline bolus and midodrine 10 mg p.o. x1 for hypotension which the patient takes at the nursing home 3 times daily.   The patient did respond well to the IV fluids and does not require vasopressor support at this time. Lactic acid normal at 1.7. According to the patient's family she is supposed to wear CPAP at night but is noncompliant. She uses supplemental oxygen as needed. Portable chest x-ray shows stable right pleural effusion and no other acute cardiopulmonary process.     ALLERGY:  Ciprofloxacin, Codeine, and Digoxin and related            MEDICAL HISTORY:  Past Medical History:   Diagnosis Date    A-fib ([facility]     Acute on chronic congestive heart failure (HCC)     Anxiety     Asthma     CAD (coronary artery disease) 01/21/2016    Cancer ([facility]  breast ca 2006    right lumpectomy    Chronic kidney disease     nephrolithiasis    COPD exacerbation ([facility] 10/12/2022    Depression     Diabetes mellitus ([facility]     H/O mammogram     Hx MRSA infection     toe infection january 2012    Hyperlipidemia     Hypertension     Lateral epicondylitis     Morbid obesity (HCC)     SERENA on CPAP     Oxygen dependent     Parkinson's disease ([facility]     Tubal ligation status        MEDICATIONS:   metOLazone  5 mg Oral Daily    furosemide  60 mg IntraVENous Q8H    linezolid  600 mg Oral 2 times per day    [Held by provider] dilTIAZem  60 mg Oral 3 times per day    sucralfate  1 g Oral 4x Daily    rOPINIRole  1 mg Oral TID    atorvastatin  20 mg Oral Nightly    ipratropium-albuterol  1 vial Inhalation 4x Daily    insulin glargine  13 Units SubCUTAneous BID    [Held by provider] LORazepam  0.5 mg Oral Nightly    metoprolol succinate  25 mg Oral BID    insulin lispro  0-4 Units SubCUTAneous TID WC    insulin lispro  0-4 Units SubCUTAneous Nightly    budesonide  0.5 mg Nebulization BID    sodium chloride flush  10 mL IntraVENous 2 times per day    miconazole   Topical BID    arformoterol tartrate  15 mcg Nebulization BID    pantoprazole  40 mg Oral QAM AC    apixaban  5 mg Oral BID    mirtazapine  7.5 mg Oral Nightly    aspirin  81 mg Oral Daily    carbidopa-levodopa  1 tablet Oral TID    sertraline  50 mg Oral Daily    montelukast  10 mg Oral Nightly    [Held by provider] midodrine  5 mg Oral TID WC      sodium chloride      dextrose           PHYSICAL EXAM:  Vitals:    01/24/23 0800   BP: 113/70   Pulse: (!) 109   Resp: 19   Temp: 97.8 °F (36.6 °C)   SpO2: 94%     FiO2 : 40 %  O2 Flow Rate (L/min): 4 L/min  O2 Device: Nasal cannula    Constitutional: No fever, chills, diaphoresis  HEENT: No head lesions, PERRL, EOMI, mouth without lesions, no nasal lesions, no cervical adenopathy palpated   Respiratory: Lungs with equal breath sounds bilaterally diminished with no  wheezes, no accessory muscle use   CV: Rapid rate and irregular rhythm, no murmurs, JVD, bilateral leg edema  Abdomen: Soft, obese, non tender, + bowel sounds, no lesions   Skin: Adequate turgor, no rash, capillary refill <2 seconds, rash noted to bilateral lower abdominal folds, wound noted to left anterior shin  Extremities: Muscular strength 4/4 in 4 limbs, moves 4 limbs spontaneously, distal pulses present   Neurology: Awake and alert, follows commands, moves 4 limbs on command and spontaneously, equal sensation, no dysmetria, neck is supple, no meningitic signs present.       LABS:  WBC   Date Value Ref Range Status   01/22/2023 7.1 4.5 - 11.5 E9/L Final   01/20/2023 8.9 4.5 - 11.5 E9/L Final   01/19/2023 10.6 4.5 - 11.5 E9/L Final     Hemoglobin   Date Value Ref Range Status   01/22/2023 8.2 (L) 11.5 - 15.5 g/dL Final   01/20/2023 8.0 (L) 11.5 - 15.5 g/dL Final   01/19/2023 7.7 (L) 11.5 - 15.5 g/dL Final     Hematocrit   Date Value Ref Range Status   01/22/2023 30.8 (L) 34.0 - 48.0 % Final   01/20/2023 30.1 (L) 34.0 - 48.0 % Final   01/19/2023 26.5 (L) 34.0 - 48.0 % Final     MCV   Date Value Ref Range Status   01/22/2023 93.1 80.0 - 99.9 fL Final   01/20/2023 95.0 80.0 - 99.9 fL Final   01/19/2023 91.1 80.0 - 99.9 fL Final     Platelets   Date Value Ref Range Status   01/22/2023 189 130 - 450 E9/L Final   01/20/2023 231 130 - 450 E9/L Final   01/19/2023 210 130 - 450 E9/L Final     Sodium   Date Value Ref Range Status   01/24/2023 146 132 - 146 mmol/L Final   01/23/2023 146 132 - 146 mmol/L Final   01/22/2023 143 132 - 146 mmol/L Final     Potassium   Date Value Ref Range Status   01/24/2023 4.0 3.5 - 5.0 mmol/L Final   01/23/2023 4.2 3.5 - 5.0 mmol/L Final   01/22/2023 4.6 3.5 - 5.0 mmol/L Final     Potassium reflex Magnesium   Date Value Ref Range Status   01/20/2023 4.8 3.5 - 5.0 mmol/L Final   01/19/2023 4.9 3.5 - 5.0 mmol/L Final   01/18/2023 5.2 (H) 3.5 - 5.0 mmol/L Final     Chloride   Date Value Ref Range Status   01/24/2023 102 98 - 107 mmol/L Final   01/23/2023 104 98 - 107 mmol/L Final   01/22/2023 102 98 - 107 mmol/L Final     CO2   Date Value Ref Range Status   01/24/2023 32 (H) 22 - 29 mmol/L Final   01/23/2023 34 (H) 22 - 29 mmol/L Final   01/22/2023 34 (H) 22 - 29 mmol/L Final     BUN   Date Value Ref Range Status   01/24/2023 55 (H) 6 - 23 mg/dL Final   01/23/2023 53 (H) 6 - 23 mg/dL Final   01/22/2023 54 (H) 6 - 23 mg/dL Final     Creatinine   Date Value Ref Range Status   01/24/2023 2.2 (H) 0.5 - 1.0 mg/dL Final   01/23/2023 2.1 (H) 0.5 - 1.0 mg/dL Final   01/22/2023 2.1 (H) 0.5 - 1.0 mg/dL Final     Glucose   Date Value Ref Range Status   01/24/2023 114 (H) 74 - 99 mg/dL Final   01/23/2023 80 74 - 99 mg/dL Final   01/22/2023 130 (H) 74 - 99 mg/dL Final     Calcium   Date Value Ref Range Status   01/24/2023 8.8 8.6 - 10.2 mg/dL Final   01/23/2023 8.7 8.6 - 10.2 mg/dL Final   01/22/2023 8.7 8.6 - 10.2 mg/dL Final     Total Protein   Date Value Ref Range Status   01/17/2023 6.1 (L) 6.4 - 8.3 g/dL Final   01/16/2023 6.3 (L) 6.4 - 8.3 g/dL Final   12/30/2022 5.8 (L) 6.4 - 8.3 g/dL Final     Albumin   Date Value Ref Range Status   01/17/2023 3.6 3.5 - 5.2 g/dL Final   01/16/2023 3.8 3.5 - 5.2 g/dL Final   12/30/2022 3.6 3.5 - 5.2 g/dL Final     Total Bilirubin   Date Value Ref Range Status   01/17/2023 0.5 0.0 - 1.2 mg/dL Final   01/16/2023 0.4 0.0 - 1.2 mg/dL Final   12/30/2022 0.4 0.0 - 1.2 mg/dL Final     Alkaline Phosphatase   Date Value Ref Range Status   01/17/2023 98 35 - 104 U/L Final   01/16/2023 96 35 - 104 U/L Final   12/30/2022 94 35 - 104 U/L Final     AST   Date Value Ref Range Status   01/17/2023 9 0 - 31 U/L Final   01/16/2023 10 0 - 31 U/L Final   12/30/2022 11 0 - 31 U/L Final     ALT   Date Value Ref Range Status   01/17/2023 <5 0 - 32 U/L Final   01/16/2023 <5 0 - 32 U/L Final   12/30/2022 <5 0 - 32 U/L Final     Est, Glom Filt Rate   Date Value Ref Range Status   01/24/2023 23 >=60 mL/min/1.73 Final     Comment:     Pediatric calculator link  https://www.kidney.org/professionals/kdoqi/gfr_calculatorped  Effective Oct 3, 2022  These results are not intended for use in patients  <18 years of age.  eGFR results are calculated without  a race factor using the 2021 CKD-EPI equation.  Careful  clinical correlation is recommended, particularly when  comparing to results calculated using previous equations.  The CKD-EPI equation is less accurate in patients with  extremes of muscle mass, extra-renal metabolism of  creatinine, excessive creatinine ingestion, or following  therapy that affects renal tubular secretion.     01/23/2023 24 >=60 mL/min/1.73 Final     Comment:     Pediatric calculator link  https://www.kidney.org/professionals/kdoqi/gfr_calculatorped  Effective Oct 3, 2022  These results are not intended for use in patients  <18 years of age.  eGFR results are calculated without  a race factor using the 2021 CKD-EPI equation.  Careful  clinical correlation is recommended, particularly when  comparing to results calculated using previous equations.  The CKD-EPI equation is less accurate in patients with  extremes of muscle mass, extra-renal metabolism of  creatinine, excessive creatinine ingestion, or following  therapy that affects renal tubular secretion.     01/22/2023 24 >=60 mL/min/1.73 Final     Comment:     Pediatric calculator link  [facility]. org/professionals/kdoqi/gfr_calculatorped  Effective Oct 3, 2022  These results are not intended for use in patients  <25years of age. eGFR results are calculated without  a race factor using the 2021 CKD-EPI equation. Careful  clinical correlation is recommended, particularly when  comparing to results calculated using previous equations. The CKD-EPI equation is less accurate in patients with  extremes of muscle mass, extra-renal metabolism of  creatinine, excessive creatinine ingestion, or following  therapy that affects renal tubular secretion. GFR    Date Value Ref Range Status   10/16/2022 >60  Final   10/14/2022 59  Final   10/13/2022 >60  Final     Magnesium   Date Value Ref Range Status   12/30/2022 2.0 1.6 - 2.6 mg/dL Final   10/14/2022 2.1 1.6 - 2.6 mg/dL Final   10/08/2022 1.9 1.6 - 2.6 mg/dL Final     Phosphorus   Date Value Ref Range Status   01/20/2023 4.7 (H) 2.5 - 4.5 mg/dL Final   01/19/2023 4.9 (H) 2.5 - 4.5 mg/dL Final   01/18/2023 5.6 (H) 2.5 - 4.5 mg/dL Final     No results for input(s): PH, PO2, PCO2, HCO3, BE, O2SAT in the last 72 hours. RADIOLOGY:  CT ABDOMEN PELVIS WO CONTRAST Additional Contrast? None   Final Result   No nephroureterolithiasis or hydronephrosis. Splenomegaly. Small volume of perihepatic ascites. At least moderate-sized partially imaged right pleural effusion with   bibasilar atelectasis. Several locules of gas which appear to be within the endometrium at the level   of the uterine fundus, of uncertain etiology or clinical significance. Please correlate clinically. Diffuse anasarca throughout the soft tissues. Cardiomegaly. XR CHEST PORTABLE   Final Result   Stable right pleural effusion.                Electronically signed by Liam Hofmfan MD on 1/24/2023 at 2:22 PM

## 2023-01-24 NOTE — PLAN OF CARE
Problem: Discharge Planning  Goal: Discharge to home or other facility with appropriate resources  1/24/2023 1849 by Marcy Green RN  Outcome: Progressing  Flowsheets (Taken 1/24/2023 0800)  Discharge to home or other facility with appropriate resources: Identify barriers to discharge with patient and caregiver  1/24/2023 0524 by Butch Clement RN  Outcome: Progressing     Problem: Respiratory - Adult  Goal: Achieves optimal ventilation and oxygenation  1/24/2023 1849 by Marcy Green RN  Outcome: Progressing  Flowsheets (Taken 1/24/2023 0800)  Achieves optimal ventilation and oxygenation: Assess for changes in respiratory status  1/24/2023 0524 by Butch Clement RN  Outcome: Progressing     Problem: Cardiovascular - Adult  Goal: Maintains optimal cardiac output and hemodynamic stability  Outcome: Progressing  Flowsheets (Taken 1/24/2023 0800)  Maintains optimal cardiac output and hemodynamic stability: Monitor blood pressure and heart rate  Goal: Absence of cardiac dysrhythmias or at baseline  Outcome: Progressing  Flowsheets (Taken 1/24/2023 0800)  Absence of cardiac dysrhythmias or at baseline: Monitor cardiac rate and rhythm     Problem: Genitourinary - Adult  Goal: Absence of urinary retention  Outcome: Progressing  Flowsheets (Taken 1/24/2023 0800)  Absence of urinary retention: Monitor intake/output and perform bladder scan as needed  Goal: Urinary catheter remains patent  1/24/2023 1849 by Marcy Green RN  Outcome: Progressing  Flowsheets (Taken 1/24/2023 0800)  Urinary catheter remains patent: Assess patency of urinary catheter  1/24/2023 0524 by Butch Clement RN  Outcome: Progressing     Problem: Chronic Conditions and Co-morbidities  Goal: Patient's chronic conditions and co-morbidity symptoms are monitored and maintained or improved  Outcome: Progressing  Flowsheets (Taken 1/24/2023 0800)  Care Plan - Patient's Chronic Conditions and Co-Morbidity Symptoms are Monitored and Maintained or Improved: Monitor and assess patient's chronic conditions and comorbid symptoms for stability, deterioration, or improvement     Problem: Skin/Tissue Integrity  Goal: Absence of new skin breakdown  Description: 1. Monitor for areas of redness and/or skin breakdown  2. Assess vascular access sites hourly  3. Every 4-6 hours minimum:  Change oxygen saturation probe site  4. Every 4-6 hours:  If on nasal continuous positive airway pressure, respiratory therapy assess nares and determine need for appliance change or resting period.   Outcome: Progressing     Problem: Safety - Adult  Goal: Free from fall injury  1/24/2023 1849 by Ghassan Stack RN  Outcome: Progressing  1/24/2023 0524 by Alessandra Kelly RN  Outcome: Progressing     Problem: ABCDS Injury Assessment  Goal: Absence of physical injury  1/24/2023 1849 by Ghassan Stack RN  Outcome: Progressing  1/24/2023 0524 by Alessandra Kelly RN  Outcome: Progressing     Problem: Pain  Goal: Verbalizes/displays adequate comfort level or baseline comfort level  1/24/2023 1849 by Ghassan Stack RN  Outcome: Progressing  Flowsheets  Taken 1/24/2023 1530  Verbalizes/displays adequate comfort level or baseline comfort level: Encourage patient to monitor pain and request assistance  Taken 1/24/2023 0800  Verbalizes/displays adequate comfort level or baseline comfort level: Encourage patient to monitor pain and request assistance  1/24/2023 0524 by Alessandra Kelly RN  Outcome: Progressing  Flowsheets (Taken 1/23/2023 1545 by Ghassan Stack RN)  Verbalizes/displays adequate comfort level or baseline comfort level: Encourage patient to monitor pain and request assistance     Problem: Nutrition Deficit:  Goal: Optimize nutritional status  Outcome: Progressing

## 2023-01-24 NOTE — PROGRESS NOTES
Physical Therapy  Physical Therapy Treatment Note/Plan of Care    Room #:  0161/0418-85  Patient Name: Pita Demarco  YOB: 1950  MRN: [id number]    Date of Service: 1/24/2023     Tentative placement recommendation: Subacute Rehab  Equipment recommendation:  To be determined      Evaluating Physical Therapist: Shantel Ayoub PT, DPT #[id number]      Specific Provider Orders/Date/Referring Provider :     01/19/23 0745    PT eval and treat  Start:  01/19/23 0745,   End:  01/19/23 0745,   ONE TIME,   Standing Count:  1 Occurrences,   Jhon Pitts MD Acknowledge New     Admitting Diagnosis:   NSTEMI (non-ST elevated myocardial infarction) [facility]) [I21.4]  Atrial fibrillation with rapid ventricular response (HCC) [I48.91]  Recurrent right pleural effusion [J90]  Hypotension, unspecified hypotension type [I95.9]  Acute on chronic respiratory failure with hypoxia and hypercapnia (HCC) [J96.21, J96.22]      Surgery: none  Visit Diagnoses         Codes    Recurrent right pleural effusion     J90    NSTEMI (non-ST elevated myocardial infarction) ([facility] [address]     I21.4            Patient Active Problem List   Diagnosis    Depression with anxiety    Osteoporosis    Asthma    Hyperlipidemia    Mitral regurgitation    Obstructive sleep apnea syndrome    Psoriasis    Diabetes mellitus ([facility] [address]    Parkinson's disease ([facility] [address]    Primary hypertension    Microalbuminuria    Morbid obesity (HCC)    RLS (restless legs syndrome)    Generalized weakness    Inability to walk    Hypothyroidism    Chest pain    Acute asthma exacerbation    Asthma exacerbation, mild    Paroxysmal atrial fibrillation (HCC)    Atrial fibrillation with rapid ventricular response (HCC)    Acute on chronic congestive heart failure ([facility] [address]    Coronary artery disease involving native coronary artery of native heart without angina pectoris    Dysphagia    Hepatosplenomegaly    Acute decompensated heart failure (HCC)    Acute diastolic (congestive) heart failure (HCC)    Nonrheumatic tricuspid valve regurgitation    Tobacco abuse    Recurrent syncope    Septic shock (HCC)    Seizure-like activity (HCC)    Acute kidney injury ([facility] [address]    Pancytopenia (HCC)    Thrombocytopenia (HCC)    Encephalopathy    Acute respiratory failure with hypoxia (HCC)    Lactic acidosis    Delirium    Acute on chronic anemia    Pleural effusion, right    Acute respiratory failure with hypoxia and hypercapnia (HCC)    Acute confusion    Acute on chronic diastolic heart failure (HCC)    Macrocytosis    Other specified anemias    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[facility] [address]    Puerperal sepsis with acute hypoxic respiratory failure without septic shock (HCC)    Pulmonary HTN (HCC)    Chronic anticoagulation    RVF (right ventricular failure) (HCC)    Metabolic alkalosis    Sepsis (HCC)    COPD exacerbation (HCC)    Acute on chronic respiratory failure with hypercapnia (HCC)    Persistent atrial fibrillation (HCC)    Hypercapnic respiratory failure (HCC)    Acute on chronic respiratory failure (HCC)    Hyperkalemia    Heart failure (HCC)    Acute renal insufficiency    Hypotension    Anemia    Acute on chronic respiratory failure with hypoxia and hypercapnia (HCC)        ASSESSMENT of Current Deficits Patient exhibits decreased strength, balance, and endurance impairing functional mobility, transfers, gait , gait distance, and tolerance to activity. Pt required MaxA for supine<>sit and Shell to sit EOB for 15 minutes. Pt required max encouragement for standing with ModA and was unable to take any steps upon standing and worked on static standing for 10-20 seconds each stand.        PHYSICAL THERAPY  PLAN OF CARE       Physical therapy plan of care is established based on physician order,  patient diagnosis and clinical assessment    Current Treatment Recommendations:    -Bed Mobility: Lower extremity exercises  and Trunk control activities   -Sitting Balance: Incorporate reaching activities to activate trunk muscles , Hands on support to maintain midline , Facilitate active trunk muscle engagement , Facilitate postural control in all planes , and Engage in core activities to allow for movement within base of support   -Standing Balance: Perform strengthening exercises in standing to promote motor control with or without upper extremity support , Instruct patient on adequate base of support to maintain balance, and Challenge balance utilizing reaching  activities beyond center of gravity    -Transfers: Provide instruction on proper hand and foot position for adequate transfer of weight onto lower extremities and use of gait device if needed, Cues for hand placement, technique and safety. Provide stabilization to prevent fall , Facilitate weight shift forward on to lower extremities and provide necessary stabilization of bilateral lower extremities , Support transfer of weight on to lower extremities, and Assist with extension of knees trunk and hip to accept weight transfer   -Gait: Gait training, Standing activities to improve: base of support, weight shift, weight bearing , Exercises to improve trunk control, Exercises to improve hip and knee control, Performance of protected weight bearing activities, and Activities to increase weight bearing   -Endurance: Utilize Supervised activities to increase level of endurance to allow for safe functional mobility including transfers and gait  and Use graduated activities to promote good breathing techniques and provide support and education to maximize respiratory function    PT long term treatment goals are located in below grid    Patient and or family understand(s) diagnosis, prognosis, and plan of care. Frequency of treatments: Patient will be seen  3-5 times/week.          Prior Level of Function: Patient ambulated with wheeled walker for short distances  Rehab Potential: fair + for baseline    Past medical history:   Past Medical History:   Diagnosis Date    A-fib ([facility] [address]     Acute on chronic congestive heart failure (HCC)     Anxiety     Asthma     CAD (coronary artery disease) 01/21/2016    Cancer ([facility] [address]  breast ca 2006    right lumpectomy    Chronic kidney disease     nephrolithiasis    COPD exacerbation ([facility] [address] 10/12/2022    Depression     Diabetes mellitus ([facility] [address]     H/O mammogram     Hx MRSA infection     toe infection january 2012    Hyperlipidemia     Hypertension     Lateral epicondylitis     Morbid obesity (HCC)     SERENA on CPAP     Oxygen dependent     Parkinson's disease ([facility] [address]     Tubal ligation status      Past Surgical History:   Procedure Laterality Date    BREAST LUMPECTOMY      BREAST REDUCTION SURGERY      CARDIAC CATHETERIZATION  4/28/2014    Dr. Mariano Betancourt  01/11/2022    Dr. Nik Shaver  7/29/15    CT GUIDED CHEST TUBE  7/8/2022    CT GUIDED CHEST TUBE 7/8/2022 Jus Galvez MD [facility]    ENDOSCOPY, COLON, DIAGNOSTIC  7/19/15    GALLBLADDER SURGERY      LUMBAR LAMINECTOMY      TOE AMPUTATION      TONSILLECTOMY      UPPER GASTROINTESTINAL ENDOSCOPY      UPPER GASTROINTESTINAL ENDOSCOPY N/A 7/19/2022    EGD ESOPHAGOGASTRODUODENOSCOPY performed by Scottie Jade MD at [address]:    Precautions:  Up with assistance, falls, O2, and morbid obesity      Social history: Patient from SNF    Equipment owned: Wheelchair, Wheeled Walker, and [address] [place] [address] 14 Mobility Inpatient   How much difficulty turning over in bed?: A Lot  How much difficulty sitting down on / standing up from a chair with arms?: A Lot  How much difficulty moving from lying on back to sitting on side of bed?: A Lot  How much help from another person moving to and from a bed to a chair?: Total  How much help from another person needed to walk in hospital room?: Total  How much help from another person for climbing 3-5 steps with a railing?: Total  AM-PAC Inpatient Mobility Raw Score : 9  AM-PAC Inpatient T-Scale Score : 30.55  Mobility Inpatient CMS 0-100% Score: 81.38  Mobility Inpatient CMS G-Code Modifier : CM    Nursing cleared patient for PT treatment. OBJECTIVE;   Initial Evaluation  Date: 1/19/2023 Treatment Date:  1/24/2023       Short Term/ Long Term   Goals   Was pt agreeable to Eval/treatment? Yes yes To be met in 3 days   Pain level   0/10   7/10  Right leg    Bed Mobility    Rolling: Maximal assist of 1    Supine to sit: Maximal assist of 1    Sit to supine: Maximal assist of 1    Scooting: Maximal assist of 1   Rolling: Maximal assist of 1   Supine to sit: Maximal assist of 1   Sit to supine: Maximal assist of 1   Scooting: Maximal assist of 1    Rolling: Minimal assist of 1    Supine to sit: Minimal assist of 1    Sit to supine: Minimal assist of 1    Scooting: Minimal assist of 1     Transfers Sit to stand: Not assessed  d/t fair- seated balance and pt declining standing Sit to stand: Moderate assist of 1 x 3 reps  due to patient's overall debility, decreased activity tolerance, balance deficits, safety and fall risk. Sit to stand:  Moderate assist of 1    Ambulation    not assessed     > 15 feet using  wheeled walker with Moderate assist of 1    Stair negotiation: ascended and descended   Not assessed           ROM Within functional limits    Increase range of motion 10% of affected joints    Strength BUE:  refer to OT eval  RLE:  4-/5  LLE:  4-/5  Increase strength in affected mm groups by 1/3 grade   Balance Sitting EOB:  fair -  Dynamic Standing:  not assessed  Sitting EOB: fair   Dynamic Standing: not assessed    Sitting EOB:  fair   Dynamic Standing: fair - with [employer]     Patient is Alert & Oriented x person, place, time, and situation and follows directions    Sensation:  Patient  denies numbness/tingling   Edema:  no   Endurance: fair -    Vitals:  4 liters nasal cannula   Blood Pressure at rest  Blood Pressure during session    Heart Rate at rest  Heart Rate during session    SPO2 at rest 90%  SPO2 during session 86-93% on 4L;      Patient education  Patient educated on role of Physical Therapy, risks of immobility, safety and plan of care, energy conservation,  importance of mobility while in hospital , ankle pumps, quad set and glut set for edema control, blood clot prevention, importance and purpose of adaptive device and adjusted to proper height for the patient. , and safety      Patient response to education:   Pt verbalized understanding Pt demonstrated skill Pt requires further education in this area   Yes Partial Yes      Treatment:  Patient practiced and was instructed/facilitated in the following treatment: Patient performed supine exercises. Pt assisted to edge of bed,  Sat edge of bed 15 minutes with Moderate assist of 1 to increase dynamic sitting balance and activity tolerance. Pt working on sitting balance due to posterior/right lateral lean. Pt fatigues quickly and requested to lay back down. Therapeutic Exercises:  ankle pumps, heel raises, long arc quad, and seated marching, x 10-15 reps. At end of session, patient in bed with alarm call light and phone within reach,  all lines and tubes intact, nursing notified. Patient would benefit from continued skilled Physical Therapy to improve functional independence and quality of life.          Patient's/ family goals   rehab    Time in   1030  Time out 1110    Total Treatment Time  40 minutes      CPT codes:    Therapeutic activities (22542)   27 minutes  2 unit(s)  Therapeutic exercises (73088)   13 minutes  1 unit(s)    Angella Echols PT License Number [id number]

## 2023-01-25 LAB
ANION GAP SERPL CALCULATED.3IONS-SCNC: 8 MMOL/L (ref 7–16)
BUN BLDV-MCNC: 56 MG/DL (ref 6–23)
CALCIUM SERPL-MCNC: 8.9 MG/DL (ref 8.6–10.2)
CHLORIDE BLD-SCNC: 103 MMOL/L (ref 98–107)
CO2: 35 MMOL/L (ref 22–29)
CREAT SERPL-MCNC: 2.2 MG/DL (ref 0.5–1)
GFR SERPL CREATININE-BSD FRML MDRD: 23 ML/MIN/1.73
GLUCOSE BLD-MCNC: 127 MG/DL (ref 74–99)
METER GLUCOSE: 132 MG/DL (ref 74–99)
METER GLUCOSE: 161 MG/DL (ref 74–99)
METER GLUCOSE: 194 MG/DL (ref 74–99)
METER GLUCOSE: 221 MG/DL (ref 74–99)
POTASSIUM SERPL-SCNC: 4 MMOL/L (ref 3.5–5)
SODIUM BLD-SCNC: 146 MMOL/L (ref 132–146)

## 2023-01-25 PROCEDURE — 94660 CPAP INITIATION&MGMT: CPT

## 2023-01-25 PROCEDURE — 6370000000 HC RX 637 (ALT 250 FOR IP): Performed by: NURSE PRACTITIONER

## 2023-01-25 PROCEDURE — 2060000000 HC ICU INTERMEDIATE R&B

## 2023-01-25 PROCEDURE — 6360000002 HC RX W HCPCS: Performed by: NURSE PRACTITIONER

## 2023-01-25 PROCEDURE — 80048 BASIC METABOLIC PNL TOTAL CA: CPT

## 2023-01-25 PROCEDURE — 6370000000 HC RX 637 (ALT 250 FOR IP): Performed by: INTERNAL MEDICINE

## 2023-01-25 PROCEDURE — 94640 AIRWAY INHALATION TREATMENT: CPT

## 2023-01-25 PROCEDURE — 97110 THERAPEUTIC EXERCISES: CPT | Performed by: PHYSICAL THERAPIST

## 2023-01-25 PROCEDURE — 2580000003 HC RX 258: Performed by: INTERNAL MEDICINE

## 2023-01-25 PROCEDURE — 97530 THERAPEUTIC ACTIVITIES: CPT | Performed by: PHYSICAL THERAPIST

## 2023-01-25 PROCEDURE — 6360000002 HC RX W HCPCS: Performed by: INTERNAL MEDICINE

## 2023-01-25 PROCEDURE — 2700000000 HC OXYGEN THERAPY PER DAY

## 2023-01-25 PROCEDURE — 82962 GLUCOSE BLOOD TEST: CPT

## 2023-01-25 PROCEDURE — 36415 COLL VENOUS BLD VENIPUNCTURE: CPT

## 2023-01-25 PROCEDURE — 99232 SBSQ HOSP IP/OBS MODERATE 35: CPT | Performed by: INTERNAL MEDICINE

## 2023-01-25 RX ORDER — FUROSEMIDE 10 MG/ML
60 INJECTION INTRAMUSCULAR; INTRAVENOUS 2 TIMES DAILY
Status: DISCONTINUED | OUTPATIENT
Start: 2023-01-25 | End: 2023-01-26

## 2023-01-25 RX ADMIN — SUCRALFATE 1 G: 1 TABLET ORAL at 13:30

## 2023-01-25 RX ADMIN — SERTRALINE 50 MG: 50 TABLET, FILM COATED ORAL at 09:20

## 2023-01-25 RX ADMIN — BUDESONIDE 500 MCG: 0.5 SUSPENSION RESPIRATORY (INHALATION) at 17:51

## 2023-01-25 RX ADMIN — SUCRALFATE 1 G: 1 TABLET ORAL at 16:51

## 2023-01-25 RX ADMIN — FUROSEMIDE 60 MG: 10 INJECTION, SOLUTION INTRAMUSCULAR; INTRAVENOUS at 17:45

## 2023-01-25 RX ADMIN — ATORVASTATIN CALCIUM 20 MG: 20 TABLET, FILM COATED ORAL at 21:29

## 2023-01-25 RX ADMIN — CARBIDOPA AND LEVODOPA 1 TABLET: 50; 200 TABLET, EXTENDED RELEASE ORAL at 21:36

## 2023-01-25 RX ADMIN — APIXABAN 5 MG: 5 TABLET, FILM COATED ORAL at 21:30

## 2023-01-25 RX ADMIN — LINEZOLID 600 MG: 600 TABLET, FILM COATED ORAL at 09:20

## 2023-01-25 RX ADMIN — ARFORMOTEROL TARTRATE 15 MCG: 15 SOLUTION RESPIRATORY (INHALATION) at 17:51

## 2023-01-25 RX ADMIN — PANTOPRAZOLE SODIUM 40 MG: 40 TABLET, DELAYED RELEASE ORAL at 06:24

## 2023-01-25 RX ADMIN — ANTI-FUNGAL POWDER MICONAZOLE NITRATE TALC FREE: 1.42 POWDER TOPICAL at 21:32

## 2023-01-25 RX ADMIN — LINEZOLID 600 MG: 600 TABLET, FILM COATED ORAL at 21:30

## 2023-01-25 RX ADMIN — ASPIRIN 81 MG: 81 TABLET, COATED ORAL at 09:20

## 2023-01-25 RX ADMIN — METOLAZONE 5 MG: 2.5 TABLET ORAL at 09:20

## 2023-01-25 RX ADMIN — ROPINIROLE HYDROCHLORIDE 1 MG: 1 TABLET, FILM COATED ORAL at 09:20

## 2023-01-25 RX ADMIN — METOPROLOL SUCCINATE 25 MG: 25 TABLET, FILM COATED, EXTENDED RELEASE ORAL at 21:29

## 2023-01-25 RX ADMIN — INSULIN GLARGINE 13 UNITS: 100 INJECTION, SOLUTION SUBCUTANEOUS at 09:22

## 2023-01-25 RX ADMIN — Medication 10 ML: at 09:21

## 2023-01-25 RX ADMIN — ROPINIROLE HYDROCHLORIDE 1 MG: 1 TABLET, FILM COATED ORAL at 13:29

## 2023-01-25 RX ADMIN — MONTELUKAST SODIUM 10 MG: 10 TABLET, FILM COATED ORAL at 21:30

## 2023-01-25 RX ADMIN — ARFORMOTEROL TARTRATE 15 MCG: 15 SOLUTION RESPIRATORY (INHALATION) at 06:28

## 2023-01-25 RX ADMIN — INSULIN LISPRO 1 UNITS: 100 INJECTION, SOLUTION INTRAVENOUS; SUBCUTANEOUS at 17:44

## 2023-01-25 RX ADMIN — IPRATROPIUM BROMIDE AND ALBUTEROL SULFATE 3 ML: .5; 2.5 SOLUTION RESPIRATORY (INHALATION) at 09:47

## 2023-01-25 RX ADMIN — IPRATROPIUM BROMIDE AND ALBUTEROL SULFATE 3 ML: .5; 2.5 SOLUTION RESPIRATORY (INHALATION) at 14:50

## 2023-01-25 RX ADMIN — Medication 10 ML: at 21:32

## 2023-01-25 RX ADMIN — SUCRALFATE 1 G: 1 TABLET ORAL at 09:20

## 2023-01-25 RX ADMIN — SUCRALFATE 1 G: 1 TABLET ORAL at 21:29

## 2023-01-25 RX ADMIN — BUDESONIDE 500 MCG: 0.5 SUSPENSION RESPIRATORY (INHALATION) at 06:28

## 2023-01-25 RX ADMIN — MIRTAZAPINE 7.5 MG: 15 TABLET, FILM COATED ORAL at 21:29

## 2023-01-25 RX ADMIN — ROPINIROLE HYDROCHLORIDE 1 MG: 1 TABLET, FILM COATED ORAL at 21:30

## 2023-01-25 RX ADMIN — METOPROLOL SUCCINATE 25 MG: 25 TABLET, FILM COATED, EXTENDED RELEASE ORAL at 09:21

## 2023-01-25 RX ADMIN — INSULIN GLARGINE 13 UNITS: 100 INJECTION, SOLUTION SUBCUTANEOUS at 21:30

## 2023-01-25 RX ADMIN — CARBIDOPA AND LEVODOPA 1 TABLET: 50; 200 TABLET, EXTENDED RELEASE ORAL at 09:21

## 2023-01-25 RX ADMIN — CARBIDOPA AND LEVODOPA 1 TABLET: 50; 200 TABLET, EXTENDED RELEASE ORAL at 16:51

## 2023-01-25 RX ADMIN — IPRATROPIUM BROMIDE AND ALBUTEROL SULFATE 3 ML: .5; 2.5 SOLUTION RESPIRATORY (INHALATION) at 06:28

## 2023-01-25 RX ADMIN — ANTI-FUNGAL POWDER MICONAZOLE NITRATE TALC FREE: 1.42 POWDER TOPICAL at 09:22

## 2023-01-25 RX ADMIN — APIXABAN 5 MG: 5 TABLET, FILM COATED ORAL at 09:20

## 2023-01-25 RX ADMIN — IPRATROPIUM BROMIDE AND ALBUTEROL SULFATE 3 ML: .5; 2.5 SOLUTION RESPIRATORY (INHALATION) at 17:51

## 2023-01-25 ASSESSMENT — PAIN SCALES - GENERAL: PAINLEVEL_OUTOF10: 0

## 2023-01-25 NOTE — CARE COORDINATION
SS NOTE: COVID NEGATIVE 1/17, PT WILL NEED A RAPID NEGATIVE COVID TEST TO RETURN TO [facility]. Pt is on 4 liters of O2 and occasionally in need of the bipap. Pt has positive blood cultures. Pt has a peripheral line. She is on IV Lasix and oral Zyvox. She has a genao. She has a wound on her left lower leg. Pt is going to [facility]. Pt is out of skilled days there and they will activate Medicaid for return there. For the return pt will need NO PRECERT, and will need a signed [place]. In regard to pt's concern that the Bipap here is more effective that the bipap at the [facility]. SW shared pt's bipap settings with [employer] [facility] and their settings was different- that set up the bipap there to be same setting as here. SS to continue. ESTHER Baugh.1/25/2023.1:51PM.

## 2023-01-25 NOTE — PROGRESS NOTES
Physical Therapy  Physical Therapy Treatment Note/Plan of Care    Room #:  5142/5382-46  Patient Name: Corinne Magana  YOB: 1950  MRN: [id number]    Date of Service: 1/25/2023     Tentative placement recommendation: Subacute Rehab  Equipment recommendation:  To be determined      Evaluating Physical Therapist: Umer Huber, PT, DPT #[id number]      Specific Provider Orders/Date/Referring Provider :     01/19/23 0745    PT eval and treat  Start:  01/19/23 0745,   End:  01/19/23 0745,   ONE TIME,   Standing Count:  1 Occurrences,   Jericho Castle MD Acknowledge New     Admitting Diagnosis:   NSTEMI (non-ST elevated myocardial infarction) [facility]) [I21.4]  Atrial fibrillation with rapid ventricular response (HCC) [I48.91]  Recurrent right pleural effusion [J90]  Hypotension, unspecified hypotension type [I95.9]  Acute on chronic respiratory failure with hypoxia and hypercapnia (HCC) [J96.21, J96.22]      Surgery: none  Visit Diagnoses         Codes    Recurrent right pleural effusion     J90    NSTEMI (non-ST elevated myocardial infarction) ([facility] [address]     I21.4            Patient Active Problem List   Diagnosis    Depression with anxiety    Osteoporosis    Asthma    Hyperlipidemia    Mitral regurgitation    Obstructive sleep apnea syndrome    Psoriasis    Diabetes mellitus ([address]    Parkinson's disease ([address]    Primary hypertension    Microalbuminuria    Morbid obesity (HCC)    RLS (restless legs syndrome)    Generalized weakness    Inability to walk    Hypothyroidism    Chest pain    Acute asthma exacerbation    Asthma exacerbation, mild    Paroxysmal atrial fibrillation (HCC)    Atrial fibrillation with rapid ventricular response (HCC)    Acute on chronic congestive heart failure ([address]    Coronary artery disease involving native coronary artery of native heart without angina pectoris    Dysphagia    Hepatosplenomegaly    Acute decompensated heart failure (HCC)    Acute diastolic (congestive) heart failure (HCC)    Nonrheumatic tricuspid valve regurgitation    Tobacco abuse    Recurrent syncope    Septic shock (HCC)    Seizure-like activity (HCC)    Acute kidney injury ([facility] [address]    Pancytopenia (HCC)    Thrombocytopenia (HCC)    Encephalopathy    Acute respiratory failure with hypoxia (HCC)    Lactic acidosis    Delirium    Acute on chronic anemia    Pleural effusion, right    Acute respiratory failure with hypoxia and hypercapnia (HCC)    Acute confusion    Acute on chronic diastolic heart failure (HCC)    Macrocytosis    Other specified anemias    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[facility] [address]    Puerperal sepsis with acute hypoxic respiratory failure without septic shock (HCC)    Pulmonary HTN (HCC)    Chronic anticoagulation    RVF (right ventricular failure) (HCC)    Metabolic alkalosis    Sepsis (HCC)    COPD exacerbation (HCC)    Acute on chronic respiratory failure with hypercapnia (HCC)    Persistent atrial fibrillation (HCC)    Hypercapnic respiratory failure (HCC)    Acute on chronic respiratory failure (HCC)    Hyperkalemia    Heart failure (HCC)    Acute renal insufficiency    Hypotension    Anemia    Acute on chronic respiratory failure with hypoxia and hypercapnia (HCC)        ASSESSMENT of Current Deficits Patient exhibits decreased strength, balance, and endurance impairing functional mobility, transfers, gait , gait distance, and tolerance to activity. Pt required MaxA for supine<>sit and Shell to sit EOB for 15 minutes. Pt declined standing with max encouragement and needed MaxA x 2 to scoot toward HOB.        PHYSICAL THERAPY  PLAN OF CARE       Physical therapy plan of care is established based on physician order,  patient diagnosis and clinical assessment    Current Treatment Recommendations:    -Bed Mobility: Lower extremity exercises  and Trunk control activities   -Sitting Balance: Incorporate reaching activities to activate trunk muscles , Hands on support to maintain midline , Facilitate active trunk muscle engagement , Facilitate postural control in all planes , and Engage in core activities to allow for movement within base of support   -Standing Balance: Perform strengthening exercises in standing to promote motor control with or without upper extremity support , Instruct patient on adequate base of support to maintain balance, and Challenge balance utilizing reaching  activities beyond center of gravity    -Transfers: Provide instruction on proper hand and foot position for adequate transfer of weight onto lower extremities and use of gait device if needed, Cues for hand placement, technique and safety. Provide stabilization to prevent fall , Facilitate weight shift forward on to lower extremities and provide necessary stabilization of bilateral lower extremities , Support transfer of weight on to lower extremities, and Assist with extension of knees trunk and hip to accept weight transfer   -Gait: Gait training, Standing activities to improve: base of support, weight shift, weight bearing , Exercises to improve trunk control, Exercises to improve hip and knee control, Performance of protected weight bearing activities, and Activities to increase weight bearing   -Endurance: Utilize Supervised activities to increase level of endurance to allow for safe functional mobility including transfers and gait  and Use graduated activities to promote good breathing techniques and provide support and education to maximize respiratory function    PT long term treatment goals are located in below grid    Patient and or family understand(s) diagnosis, prognosis, and plan of care. Frequency of treatments: Patient will be seen  3-5 times/week.          Prior Level of Function: Patient ambulated with wheeled walker for short distances  Rehab Potential: fair + for baseline    Past medical history:   Past Medical History:   Diagnosis Date    A-fib ([facility] [address]     Acute on chronic congestive heart failure ([facility] [address]     Anxiety Asthma     CAD (coronary artery disease) 01/21/2016    Cancer ([facility] [address]  breast ca 2006    right lumpectomy    Chronic kidney disease     nephrolithiasis    COPD exacerbation ([facility] [address] 10/12/2022    Depression     Diabetes mellitus ([facility] [address]     H/O mammogram     Hx MRSA infection     toe infection january 2012    Hyperlipidemia     Hypertension     Lateral epicondylitis     Morbid obesity (HCC)     SERENA on CPAP     Oxygen dependent     Parkinson's disease ([facility] [address]     Tubal ligation status      Past Surgical History:   Procedure Laterality Date    BREAST LUMPECTOMY      BREAST REDUCTION SURGERY      CARDIAC CATHETERIZATION  4/28/2014    Dr. Hesham Holly  01/11/2022    Dr. Quang Reed  7/29/15    CT GUIDED CHEST TUBE  7/8/2022    CT GUIDED CHEST TUBE 7/8/2022 Mary Lira MD [facility]    ENDOSCOPY, COLON, DIAGNOSTIC  7/19/15    GALLBLADDER SURGERY      LUMBAR LAMINECTOMY      TOE AMPUTATION      TONSILLECTOMY      UPPER GASTROINTESTINAL ENDOSCOPY      UPPER GASTROINTESTINAL ENDOSCOPY N/A 7/19/2022    EGD ESOPHAGOGASTRODUODENOSCOPY performed by Sultana Godfrey MD at [address]:    Precautions:  Up with assistance, falls, O2, and morbid obesity      Social history: Patient from SNF    Equipment owned: Wheelchair, Wheeled Walker, and [address] [place] [address] [address] Mobility Inpatient   How much difficulty turning over in bed?: A Lot  How much difficulty sitting down on / standing up from a chair with arms?: Unable  How much difficulty moving from lying on back to sitting on side of bed?: A Lot  How much help from another person moving to and from a bed to a chair?: Total  How much help from another person needed to walk in hospital room?: Total  How much help from another person for climbing 3-5 steps with a railing?: Total  AM-PAC Inpatient Mobility Raw Score : 8  AM-PAC Inpatient T-Scale Score : 28.52  Mobility Inpatient CMS 0-100% Score: 86.62  Mobility Inpatient CMS G-Code Modifier : CM    Nursing cleared patient for PT treatment. OBJECTIVE;   Initial Evaluation  Date: 1/19/2023 Treatment Date:  1/25/2023       Short Term/ Long Term   Goals   Was pt agreeable to Eval/treatment? Yes yes To be met in 3 days   Pain level   0/10   7/10  Right leg    Bed Mobility    Rolling: Maximal assist of 1    Supine to sit: Maximal assist of 1    Sit to supine: Maximal assist of 1    Scooting: Maximal assist of 1   Rolling: Maximal assist of 1   Supine to sit: Maximal assist of 1   Sit to supine: Maximal assist of 1   Scooting: Maximal assist of 1    Rolling: Minimal assist of 1    Supine to sit: Minimal assist of 1    Sit to supine: Minimal assist of 1    Scooting: Minimal assist of 1     Transfers Sit to stand: Not assessed  d/t fair- seated balance and pt declining standing Sit to stand: Not assessed  pt declined with max encouragement      Sit to stand:  Moderate assist of 1    Ambulation    not assessed     > 15 feet using  wheeled walker with Moderate assist of 1    Stair negotiation: ascended and descended   Not assessed           ROM Within functional limits    Increase range of motion 10% of affected joints    Strength BUE:  refer to OT eval  RLE:  4-/5  LLE:  4-/5  Increase strength in affected mm groups by 1/3 grade   Balance Sitting EOB:  fair -  Dynamic Standing:  not assessed  Sitting EOB: fair   Dynamic Standing: not assessed    Sitting EOB:  fair   Dynamic Standing: fair - with [employer]     Patient is Alert & Oriented x person, place, time, and situation and follows directions    Sensation:  Patient  denies numbness/tingling   Edema:  no   Endurance: fair -    Vitals:  5-8 liters nasal cannula   Blood Pressure at rest  Blood Pressure during session    Heart Rate at rest  Heart Rate during session    SPO2 at rest 90%  SPO2 during session 78-93% on 5-8L;      Patient education  Patient educated on role of Physical Therapy, risks of immobility, safety and plan of care, energy conservation,  importance of mobility while in hospital , ankle pumps, quad set and glut set for edema control, blood clot prevention, importance and purpose of adaptive device and adjusted to proper height for the patient. , and safety      Patient response to education:   Pt verbalized understanding Pt demonstrated skill Pt requires further education in this area   Yes Partial Yes      Treatment:  Patient practiced and was instructed/facilitated in the following treatment: Patient performed supine exercises. Pt assisted to edge of bed,  Sat edge of bed 15 minutes with Minimal assist of 1 to increase dynamic sitting balance and activity tolerance. Pt performed bed mobility, sat EOB and performed seated exercises. Therapeutic Exercises:  ankle pumps, heel raises, long arc quad, and seated marching, x 15 reps. At end of session, patient in bed with alarm call light and phone within reach,  all lines and tubes intact, nursing notified. Patient would benefit from continued skilled Physical Therapy to improve functional independence and quality of life.          Patient's/ family goals   rehab    Time in   1042  Time out 1115    Total Treatment Time  33 minutes      CPT codes:    Therapeutic activities (80618)   18 minutes  1 unit(s)  Therapeutic exercises (15405)   15 minutes  1 unit(s)    Ranjith eFlder PT License Number [id number]

## 2023-01-25 NOTE — PROGRESS NOTES
NAME: Noé Lawson  MR:  [id number]  :   1950  Admit Date:  2023      This is a face to face encounter with [facility] of this note, including Diagnosis,  Interval History, Past Medical/Surgical/Family/Social Histories, ROS, physical exam, and Assessment and Plan were copied and pasted from Previous. Updates have been made where noted and reflect current exam and medical decision making from the DOS of this encounter. CHIEF COMPLAINT     ID following for   Chief Complaint   Patient presents with    Respiratory Distress     Normally on 3L NC, came in on CPAP, given total of 50mcg push dose epi for low BP by EMS     HISTORY OF PRESENT ILLNESS     Noé Lawson is a 67 y.o. female who presents with   Chief Complaint   Patient presents with    Respiratory Distress     Normally on 3L NC, came in on CPAP, given total of 50mcg push dose epi for low BP by EMS     2023 and has  has a past medical history of A-fib ([address] Acute on chronic congestive heart failure ([address] Anxiety, Asthma, CAD (coronary artery disease), Cancer ([address] Chronic kidney disease, COPD exacerbation ([address] Depression, Diabetes mellitus ([address] H/O mammogram, Hx MRSA infection, Hyperlipidemia, Hypertension, Lateral epicondylitis, Morbid obesity ([address] SERENA on CPAP, Oxygen dependent, Parkinson's disease ([address] and Tubal ligation status. Pt seen and examined 23  In bed   Patient is in bed- no distress  On 4 liters- afebrile. Reports dysuria is better     Patient is tolerating medications. No reported adverse drug reactions.   Available labs, imaging studies, microbiologic studies have been reviewed      Assessment & Plan   Pt was admitted with   NSTEMI (non-ST elevated myocardial infarction) ([address] [I21.4]  Atrial fibrillation with rapid ventricular response (HCC) [I48.91]  Recurrent right pleural effusion [J90]  Hypotension, unspecified hypotension type [I95.9]  Acute on chronic respiratory failure with hypoxia and hypercapnia (HCC) [J96.21, J96.22]    ID following for   Hypothermia better   Vre uti day   left le cons colonized not infected   Right pleural effusion with bibasilar atelectasis. Plan:   Continue Linezolid- Day # 6 of 8 linezolid   Encourage IS  On zoloft- will watch for interaction   Monitor labs creta- 2.2 - will repeat in am cbc/diff   Pulmonary following   Continue wound care     As above      This is a face to face encounter with Andrea Rodriguez on 01/25/23. I discussed the findings and plans with  NURSE PRACTITIONER, RASHARD Billy and agree as documented in her note. See below for additional details and treatment plan. Andrea Rodriguez is a 67 y.o. female who presents with   Chief Complaint   Patient presents with    Respiratory Distress     Normally on 3L NC, came in on CPAP, given total of 50mcg push dose epi for low BP by EMS    and has  has a past medical history of A-fib ([facility] [address] Acute on chronic congestive heart failure ([facility] [address] Anxiety, Asthma, CAD (coronary artery disease), Cancer ([address] Chronic kidney disease, COPD exacerbation ([facility] [address] Depression, Diabetes mellitus ([address] H/O mammogram, Hx MRSA infection, Hyperlipidemia, Hypertension, Lateral epicondylitis, Morbid obesity ([address] SERENA on CPAP, Oxygen dependent, Parkinson's disease ([address] and Tubal ligation status. ID was consulted for   Admit DX:  NSTEMI (non-ST elevated myocardial infarction) ([facility] [address] [I21.4]  Atrial fibrillation with rapid ventricular response (HCC) [I48.91]  Recurrent right pleural effusion [J90]  Hypotension, unspecified hypotension type [I95.9]  Acute on chronic respiratory failure with hypoxia and hypercapnia (HCC) [J96.21, J96.22]    Id following for VRE        linezolid (ZYVOX) tablet 600 mg, 2 times per day  FEW MORE DAYS    CHECK CBC    Imaging and labs were reviewed. Thank you for involving me in the care of Andrea Rodriguez.  Please do not hesitate to call for any questions or concerns. Electronically signed by Abbie Estrada MD on 2023 at 3:48 PM             BP 97/62   Pulse 78   Temp 98.2 °F (36.8 °C) (Axillary)   Resp 23   Ht 5' (1.524 m)   Wt 285 lb 6.4 oz (129.5 kg)   SpO2 96%   BMI 55.74 kg/m²   Temp  Av.1 °F (36.7 °C)  Min: 97.5 °F (36.4 °C)  Max: 98.4 °F (36.9 °C)  CONSTITUTIONAL:  no apparent distress, in bed- no distress   ENT:  Normocephalic, atraumatic,     LUNGS:  No increased work of breathing,  dec  to auscultation bilaterally, on o2   CARDIOVASCULAR:    regular rate and rhythm, normal S1 and S2,     ABDOMEN:  normal bowel sounds, soft, distended, non-tender  MUSCULOSKELETAL:   swelling  BLE.   edema   Diaz   NEUROLOGIC:  Awake, alert, oriented. SKIN:   no rashes bruises  Lines:      Peripheral Intravenous Line:  Peripheral IV 23 Left Antecubital (Active)   Site Assessment Clean, dry & intact 23   Line Status Flushed;Blood return noted;Capped;Normal saline locked 23   Line Care Connections checked and tightened; Chlorhexidine wipes; Line pulled back;Ports disinfected;Cap changed 23   Phlebitis Assessment No symptoms 23   Infiltration Assessment 0 23   Alcohol Cap Used No 23   Dressing Status Clean, dry & intact 23   Dressing Type Transparent 23   Dressing Intervention Other (Comment) 23       Peripheral IV 23 Left;Ventral Forearm (Active)   Site Assessment Clean, dry & intact 23   Line Status Flushed;Brisk blood return; Infusing;Capped 23   Line Care Connections checked and tightened; Chlorhexidine wipes; Line pulled back;Ports disinfected;Cap changed 23   Phlebitis Assessment No symptoms 23   Infiltration Assessment 0 23   Alcohol Cap Used Yes 23   Dressing Status Clean, dry & intact 23   Dressing Type Transparent 23   Dressing Intervention Other (Comment) 01/20/23 2000     Drain(s):  Urinary Catheter 01/18/23 Idaz-Temperature (Active)   $ Urethral catheter insertion $ Not inserted for procedure 01/18/23 0319   Catheter Indications Need for fluid volume management of the critically ill patient in a critical care setting 01/21/23 0124   Site Assessment Pink;Moist;No urethral drainage 01/21/23 0124   Urine Color Yellow 01/21/23 0124   Urine Appearance Cloudy 01/21/23 0124   Collection Container Standard 01/21/23 0124   Securement Method Securing device (Describe) 01/21/23 0124   Catheter Care Completed Yes 01/20/23 2000   Catheter Best Practices  Drainage tube clipped to bed;Catheter secured to thigh; Tamper seal intact; Bag below bladder;Bag not on floor; Lack of dependent loop in tubing;Drainage bag less than half full 01/21/23 0124   Status Draining;Patent 01/21/23 0124   Output (mL) 250 mL 01/20/23 1400     Microbiology:   No results for input(s): COVID19 in the last 72 hours. Lab Results   Component Value Date/Time    BC 5 Days no growth 01/17/2023 05:23 PM    BC 5 Days no growth 10/12/2022 04:59 PM    BC 5 Days no growth 10/06/2022 09:47 AM    BLOODCULT2 5 Days no growth 01/17/2023 05:23 PM    BLOODCULT2 5 Days no growth 10/12/2022 04:59 PM    BLOODCULT2 5 Days no growth 10/05/2022 11:19 AM    ORG Enterococcus faecium 01/18/2023 03:26 AM    ORG Staphylococcus epidermidis 01/17/2023 10:10 PM    ORG Escherichia coli 10/05/2022 11:19 AM     CULTURE, RESPIRATORY   Date Value Ref Range Status   07/06/2022 Oral Pharyngeal Shavon absent (A)  Final   07/06/2022 Light growth  Final     WOUND/ABSCESS   Date Value Ref Range Status   01/17/2023 Light growth  Final   07/05/2022 Heavy growth  Final   07/05/2022 Heavy growth  Final     Smear, Respiratory   Date Value Ref Range Status   07/06/2022 Refer to ordered Gram stain for results  Final         Component Value Date/Time    AFBCX No growth after 6 weeks of incubation.  10/18/2022 1106    AFBCX No growth after 6 weeks of incubation. 07/08/2022 1546    FUNGSM No Fungal elements seen 07/08/2022 1546    LABFUNG No growth after 4 weeks of incubation. 07/08/2022 1546     MRSA Culture Only   Date Value Ref Range Status   01/17/2023 Methicillin resistant Staph aureus not isolated  Final     LABS:    Recent Labs     01/23/23  0448 01/24/23  0825 01/25/23  0755    146 146   K 4.2 4.0 4.0    102 103   CO2 34* 32* 35*   BUN 53* 55* 56*   CREATININE 2.1* 2.2* 2.2*   GLUCOSE 80 114* 127*   CALCIUM 8.7 8.8 8.9       Lab Results   Component Value Date    SEDRATE 6 07/06/2022    SEDRATE 15 10/24/2021    SEDRATE 115 (H) 02/03/2021     Lab Results   Component Value Date    CRP 5.0 (H) 07/06/2022    CRP 0.7 (H) 10/25/2021    CRP 13.5 (H) 02/03/2021     Lab Results   Component Value Date    PROCAL 0.33 (H) 01/18/2023    PROCAL 0.24 (H) 07/13/2022    PROCAL 0.22 (H) 07/06/2022     REVIEW OF SYSTEMS     As stated above in the chief complaint, otherwise negative.   CURRENT MEDICATIONS     Current Facility-Administered Medications:     metOLazone (ZAROXOLYN) tablet 5 mg, 5 mg, Oral, Daily, Pamela Mena MD, 5 mg at 01/25/23 0920    linezolid (ZYVOX) tablet 600 mg, 600 mg, Oral, 2 times per day, Amilcar Coronado MD, 600 mg at 01/25/23 0920    [Held by provider] dilTIAZem (CARDIZEM) tablet 60 mg, 60 mg, Oral, 3 times per day, Amilcar Coronado MD, 60 mg at 01/20/23 0616    sucralfate (CARAFATE) tablet 1 g, 1 g, Oral, 4x Daily, Chioma Hawthorne MD, 1 g at 01/25/23 0920    rOPINIRole (REQUIP) tablet 1 mg, 1 mg, Oral, TID, Chioma Hawthorne MD, 1 mg at 01/25/23 0920    atorvastatin (LIPITOR) tablet 20 mg, 20 mg, Oral, Nightly, Chioma Hawthorne MD, 20 mg at 01/24/23 2106    ipratropium-albuterol (DUONEB) nebulizer solution 3 mL, 1 vial, Inhalation, 4x Daily, Chioma Hawthorne MD, 3 mL at 01/25/23 0947    insulin glargine (LANTUS) injection vial 13 Units, 13 Units, SubCUTAneous, BID, Chioma Hawthorne MD, 13 Units at 01/25/23 0922    loperamide (IMODIUM) capsule 2 mg, 2 mg, Oral, 4x Daily PRN, Brandie Lopez MD    [Held by provider] LORazepam (ATIVAN) tablet 0.5 mg, 0.5 mg, Oral, Nightly, Brandie Lopez MD, 0.5 mg at 01/20/23 2317    metoprolol succinate (TOPROL XL) extended release tablet 25 mg, 25 mg, Oral, BID, Brandie Lopez MD, 25 mg at 01/25/23 0921    insulin lispro (HUMALOG) injection vial 0-4 Units, 0-4 Units, SubCUTAneous, TID WC, Brandie Lopez MD, 1 Units at 01/20/23 1646    insulin lispro (HUMALOG) injection vial 0-4 Units, 0-4 Units, SubCUTAneous, Nightly, Brandie Lopez MD, 4 Units at 01/18/23 2158    budesonide (PULMICORT) nebulizer suspension 500 mcg, 0.5 mg, Nebulization, BID, Brandie Lopez MD, 500 mcg at 01/25/23 9547    sodium chloride flush 0.9 % injection 10 mL, 10 mL, IntraVENous, 2 times per day, Brandie Lopez MD, 10 mL at 01/25/23 5526    sodium chloride flush 0.9 % injection 10 mL, 10 mL, IntraVENous, PRN, Brandie Lopez MD    0.9 % sodium chloride infusion, , IntraVENous, PRN, Brandie Lopez MD    promethazine (PHENERGAN) tablet 12.5 mg, 12.5 mg, Oral, Q6H PRN **OR** ondansetron (ZOFRAN) injection 4 mg, 4 mg, IntraVENous, Q6H PRN, Brandie Lopez MD    polyethylene glycol (GLYCOLAX) packet 17 g, 17 g, Oral, Daily PRN, Brandie Lopez MD    acetaminophen (TYLENOL) tablet 650 mg, 650 mg, Oral, Q6H PRN, 650 mg at 01/24/23 1254 **OR** acetaminophen (TYLENOL) suppository 650 mg, 650 mg, Rectal, Q6H PRN, Brandie Lopez MD    miconazole (MICOTIN) 2 % powder, , Topical, BID, RASHARD Ballard - CNP, Given at 01/25/23 1585    arformoterol tartrate (BROVANA) nebulizer solution 15 mcg, 15 mcg, Nebulization, BID, Recardo Alexia, APRN - CNP, 15 mcg at 01/25/23 0164    pantoprazole (PROTONIX) tablet 40 mg, 40 mg, Oral, QAM AC, Luke Porginski, APRN - CNP, 40 mg at 01/25/23 0624    glucose chewable tablet 16 g, 4 tablet, Oral, PRN, Barbarao Alexia, APRN - CNP    dextrose bolus 10% 125 mL, 125 mL, IntraVENous, PRN **OR** dextrose bolus 10% 250 mL, 250 mL, IntraVENous, PRN, RASHARD Padilla - CNP    glucagon (rDNA) injection 1 mg, 1 mg, SubCUTAneous, PRN, RASHARD Padilla - CNP    dextrose 10 % infusion, , IntraVENous, Continuous PRN, Elizabeth Mayer APRN - CNP    apixaban (ELIQUIS) tablet 5 mg, 5 mg, Oral, BID, RASHARD Padilla - CNP, 5 mg at 01/25/23 0920    mirtazapine (REMERON) tablet 7.5 mg, 7.5 mg, Oral, Nightly, RASHARD Padilla - CNP, 7.5 mg at 01/24/23 2106    aspirin EC tablet 81 mg, 81 mg, Oral, Daily, Aaron Lowry APRN - CNP, 81 mg at 01/25/23 0920    carbidopa-levodopa (SINEMET CR)  MG per extended release tablet 1 tablet, 1 tablet, Oral, TID, RASHARD Padilla - CNP, 1 tablet at 01/25/23 5919    sertraline (ZOLOFT) tablet 50 mg, 50 mg, Oral, Daily, Elizabeth Mayer APRN - CNP, 50 mg at 01/25/23 0920    montelukast (SINGULAIR) tablet 10 mg, 10 mg, Oral, Nightly, Elizabeth Mayer APRN - CNP, 10 mg at 01/24/23 2106    [Held by provider] midodrine (PROAMATINE) tablet 5 mg, 5 mg, Oral, TID WC, RASHARD Padilla - CNP  DIAGNOSTIC RESULTS   Radiology:  CT ABDOMEN PELVIS WO CONTRAST Additional Contrast? None    Result Date: 1/18/2023  EXAMINATION: CT OF THE ABDOMEN AND PELVIS WITHOUT CONTRAST 1/18/2023 5:47 am TECHNIQUE: CT of the abdomen and pelvis was performed without the administration of intravenous contrast. Multiplanar reformatted images are provided for review. Automated exposure control, iterative reconstruction, and/or weight based adjustment of the mA/kV was utilized to reduce the radiation dose to as low as reasonably achievable. COMPARISON: October 8, 2022 HISTORY: ORDERING SYSTEM PROVIDED HISTORY: R/O obstructive uropathy TECHNOLOGIST PROVIDED HISTORY: Reason for exam:->R/O obstructive uropathy Additional Contrast?->None FINDINGS: Lower Chest: The heart is enlarged. There is a small pericardial effusion and at least a moderate size right pleural effusion is partially imaged.   A trace left pleural effusion is noted. Left lower lobe subsegmental atelectasis is noted. Organs: The gallbladder is surgically absent. The liver has an unremarkable unenhanced appearance. The spleen is enlarged measuring 15.9 cm in craniocaudal dimension. The unenhanced pancreas demonstrates diffuse fatty atrophy. The kidneys are without hydronephrosis or radiopaque calculus. GI/Bowel: No evidence of a bowel obstruction, free air or pneumatosis. Portions of the colon are decompressed, limiting assessment for mucosal based abnormalities. The appendix is not confidently visualized. No obvious pericecal inflammatory changes to suggest acute appendicitis. Pelvis: The urinary bladder is decompressed around a Diaz catheter. A small amount of the intraluminal gas in the urinary bladder may be related to recent instrumentation. A small amount of gas is present in the uterine fundus, apparently within the endometrial canal.  The ovaries are not confidently visualized. There are scattered visible but nonenlarged pelvic lymph nodes. Peritoneum/Retroperitoneum: A small amount of ascites is present, predominantly in a perihepatic distribution. The abdominal aorta is mildly calcified without evidence of aneurysm. No retroperitoneal lymphadenopathy or mass. Bones/Soft Tissues: No acute osseous abnormality or evidence of an aggressive osseous lesion. Anasarca is present throughout the soft tissues. No nephroureterolithiasis or hydronephrosis. Splenomegaly. Small volume of perihepatic ascites. At least moderate-sized partially imaged right pleural effusion with bibasilar atelectasis. Several locules of gas which appear to be within the endometrium at the level of the uterine fundus, of uncertain etiology or clinical significance. Please correlate clinically. Diffuse anasarca throughout the soft tissues. Cardiomegaly.      XR FOOT RIGHT (MIN 3 VIEWS)    Result Date: 1/16/2023  EXAMINATION: THREE XRAY VIEWS OF THE RIGHT FOOT 1/16/2023 12:40 pm COMPARISON: None. HISTORY: ORDERING SYSTEM PROVIDED HISTORY: Evaluate big toe wound TECHNOLOGIST PROVIDED HISTORY: Reason for exam:->Evaluate big toe wound FINDINGS: Soft tissue wound evident at the great toe. No radiographically visible acute osteomyelitis. 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 No radiopaque foreign body or soft tissue emphysema. Plantar heel spur noted. No evident acute osteomyelitis. Great toe soft tissue ulcer. Soft tissue swelling. See above. XR CHEST PORTABLE    Result Date: 1/17/2023  EXAMINATION: ONE XRAY VIEW OF THE CHEST 1/17/2023 5:38 pm COMPARISON: 01/16/2023 HISTORY: ORDERING SYSTEM PROVIDED HISTORY: sob TECHNOLOGIST PROVIDED HISTORY: Reason for exam:->sob FINDINGS: The lungs are without acute focal process. Stable right pleural effusion. No pneumothorax. Stable heart size. The osseous structures are without acute process. Stable right pleural effusion. XR CHEST PORTABLE    Result Date: 1/16/2023  EXAMINATION: ONE XRAY VIEW OF THE CHEST 1/16/2023 8:53 am COMPARISON: 30 December 2022 HISTORY: ORDERING SYSTEM PROVIDED HISTORY: SOB TECHNOLOGIST PROVIDED HISTORY: Reason for exam:->SOB FINDINGS: Stable right pleural effusion with adjacent atelectasis and or infiltrate. Stable cardiomediastinal silhouette. No new abnormal findings. Stable right-sided pleural effusion with adjacent atelectasis and or infiltrate. XR CHEST 1 VIEW    Result Date: 12/30/2022  EXAMINATION: ONE XRAY VIEW OF THE CHEST 12/30/2022 3:21 pm COMPARISON: 12/19/2022 HISTORY: ORDERING SYSTEM PROVIDED HISTORY: SOB TECHNOLOGIST PROVIDED HISTORY: Reason for exam:->SOB FINDINGS: The heart is enlarged. There is a right pleural effusion right lung base atelectasis. I cannot exclude partial collapse of the right lower lobe. The left lung is clear. There is no pneumothorax.      1. Right pleural effusion right lung base atelectasis. 2. I cannot exclude partial collapse of the right lower lobe 3. Cardiomegaly      Imaging and labs were reviewed per medical records. Thank you for involving me in the care of Marlo Padilla I will continue to follow. Please do not hesitate to call [phone] for any questions or concerns.     Electronically signed by RASHARD Newton on 1/25/2023 at 11:45 AM

## 2023-01-25 NOTE — PROGRESS NOTES
[facility] MD Una Ortiz MD Lore Hering, MD Rosaleen Blitz, BRADLEY Devlin, ANP  Yajaira Kramer CNP  Progress Note    1/25/2023    SUBJECTIVE:   1/20: Feeling little better today. Denies new complaint. Still tachypnea, though denies dyspnea no cp/palp Appetite ok ROS otherwise unremarkable    1//21 seen in her room on o2/nc bp stable weak poor po intake   2+ edema in LE     1/22 charts reviewed . On bipap    1/23 : on o2/nc . Still look hypervolemic     1/24 seen in her room comfortable o2/nc . Still with LE edema which seems to be multifactorial and will likely need to accept having some edema on board     1/25 sitting on edge of the bed working with pt . Still with considerable edema in LEs   BP stable   PROBLEM LIST:    Principal Problem:    Acute on chronic respiratory failure with hypoxia and hypercapnia (HCC)  Resolved Problems:    * No resolved hospital problems. *         DIET:    ADULT DIET; Regular; 3 carb choices (45 gm/meal); Low Fat/Low Chol/High Fiber/2 gm Na; Moderately Thick (Honey); 1800 ml  ADULT ORAL NUTRITION SUPPLEMENT; Dinner;  Other Oral Supplement; Gelatein 20     MEDS (scheduled):    furosemide  60 mg IntraVENous BID    metOLazone  5 mg Oral Daily    linezolid  600 mg Oral 2 times per day    [Held by provider] dilTIAZem  60 mg Oral 3 times per day    sucralfate  1 g Oral 4x Daily    rOPINIRole  1 mg Oral TID    atorvastatin  20 mg Oral Nightly    ipratropium-albuterol  1 vial Inhalation 4x Daily    insulin glargine  13 Units SubCUTAneous BID    [Held by provider] LORazepam  0.5 mg Oral Nightly    metoprolol succinate  25 mg Oral BID    insulin lispro  0-4 Units SubCUTAneous TID WC    insulin lispro  0-4 Units SubCUTAneous Nightly    budesonide  0.5 mg Nebulization BID    sodium chloride flush  10 mL IntraVENous 2 times per day    miconazole   Topical BID    arformoterol tartrate  15 mcg Nebulization BID    pantoprazole  40 mg Oral QAM AC    apixaban  5 mg Oral BID    mirtazapine  7.5 mg Oral Nightly    aspirin  81 mg Oral Daily    carbidopa-levodopa  1 tablet Oral TID    sertraline  50 mg Oral Daily    montelukast  10 mg Oral Nightly    [Held by provider] midodrine  5 mg Oral TID WC       MEDS (infusions):   sodium chloride      dextrose         MEDS (prn):  loperamide, sodium chloride flush, sodium chloride, promethazine **OR** ondansetron, polyethylene glycol, acetaminophen **OR** acetaminophen, glucose, dextrose bolus **OR** dextrose bolus, glucagon (rDNA), dextrose    PHYSICAL EXAM:     Patient Vitals for the past 24 hrs:   BP Temp Temp src Pulse Resp SpO2 Weight   01/25/23 1653 117/72 98.2 °F (36.8 °C) Oral 92 17 92 % --   01/25/23 1450 -- -- -- -- 22 -- --   01/25/23 0948 -- -- -- -- 23 -- --   01/25/23 0720 97/62 98.2 °F (36.8 °C) Axillary 78 20 96 % --   01/25/23 0640 -- -- -- -- 23 -- --   01/25/23 0600 -- -- -- -- -- -- 285 lb 6.4 oz (129.5 kg)   01/25/23 0315 100/63 97.5 °F (36.4 °C) Oral 92 16 100 % --   01/24/23 2153 -- -- -- 71 -- 97 % --   01/24/23 1948 106/63 98.4 °F (36.9 °C) Oral 71 18 97 % --     @      Intake/Output Summary (Last 24 hours) at 1/25/2023 1741  Last data filed at 1/25/2023 1353  Gross per 24 hour   Intake 960 ml   Output 1750 ml   Net -790 ml           Wt Readings from Last 3 Encounters:   01/25/23 285 lb 6.4 oz (129.5 kg)   01/16/23 259 lb (117.5 kg)   01/16/23 259 lb (117.5 kg)     Age-appropriate morbidly obese white woman, though easily arousable, no apparent distress  NC/AT EOMI sclera conjunctive are clear and anicteric mucous membranes are moist  Neck soft supple trachea midline  Coarse breath sounds with crackles on the right, mildly prolonged expiratory phase but no wheeze. Arguably a little more clear than yesterday  Regular rhythm normal S1 and S2  Abdomen soft nontender nondistended normal active bowel sounds.   Abdominal pannus has substantial edema  Distal extremities, thighs, sacrum have substantial chronic type nonpitting edema, though no pitting edema  Pulses 1+ x4  Moves all 4 extremities  Cranial nerves II through XII grossly intact  Awake, alert, interactive and appropriate  Skin tone consistent with chronic venous stasis distally      DATA:    No results for input(s): WBC, HGB, HCT, MCV, PLT in the last 72 hours. Recent Labs     01/23/23  0448 01/24/23  0825 01/25/23  0755    146 146   K 4.2 4.0 4.0    102 103   CO2 34* 32* 35*   BUN 53* 55* 56*   CREATININE 2.1* 2.2* 2.2*         Lab Results   Component Value Date    LABPROT 0.5 (H) 01/18/2023    LABPROT 0.5 01/18/2023     On CT no hydro/signs of obstruction     Urine studies  U Na 21, U Cl 23 U prot:cr ratio 0.5    ASSESSMENT / RECOMMENDATIONS:       Assessment  1. Acute kidney injury urine lytes support decrease effective volume     2. CKD stage III, Baseline creatinine 1.4 to 1.7 mg/dL, secondary to diabetic nephropathy and renal microvascular atherosclerotic disease  0.5 g proteinuria     3. Anemia due to CKD, phlebotomy associated prolonged hospitalization     4. Acute hypercapnic and hypoxic respiratory failure due to COPD exacerbation and pneumonia. Does not appear hypervolemic. 5.  Morbid obesity, BMI 50.6 kg per metered square     6.   Edema/anasarca, chronic, multifactorial, due to venous insufficiency in the setting of morbid obesity, relative immobility, likely diastolic dysfunction though it was \"indeterminate\" on echo, the does have pulmonary hypertension, mild right-sided heart failure     Recommendations    Still look hypervolemic   Azotemia relatively stable    Continue iv diuresis as ordered lasix 60 iv bid  for 3 more doses  Metolazone for few doses   Compression stocking   Out of bed to chair   Fu serial bmps UO        Electronically signed by Madan Marsh MD on 1/25/2023

## 2023-01-25 NOTE — PROGRESS NOTES
Pulmonary/Critical Care Progress Note    CHIEF COMPLAINT: Shortness of breath and hypotension      IMPRESSION/ PLAN:    Acute on chronic hypoxic and hypercapnic respiratory failure  COPD   Obesity induced hypoventilation syndrome  SERENA noncompliant with CPAP  BiPAP qhs    DuNuvia and Zachariaha    Stable overall  Likely at baseline (although states SOB today)    Need to make sure has BiPAP machine at SNF in order to prepare for discharge     CHF - Diastolic  Pleural effusions  Very elevated pro-BNP  Diuresis ongoing    UTI with VRE  On Linezolid PO  ID consulted    Severe pulmonary hypertension  Secondary to above      SHANTANU on CKD  Hyperkalemia  Elevated K+ resolved  Close to baseline  Nephrology consultation appreciated   Follow     Chronic atrial fibrillation   Rate controlled  On metoprolol / dilt  Eliquis      Type 2 diabetes   Insulin sliding scale      Anemia  Transfuse for Hgb  < 7    Intertrigo  Wound culture to left lower leg  Miconazole powder to rash on lower abdominal folds twice daily    1/25/2023   Overall stable   No real change    Discharge planning  PT/OT  __________________________________________    Subjective  Breathing at baseline    Events last 24 hr  Continuing to use BiPAP when sleeping       HISTORY OF PRESENT ILLNESS: Patient is a 56-year-old female with history of COPD, CAD, breast cancer, CKD, hypertension, hyperlipidemia, diabetes mellitus, obstructive sleep apnea noncompliant with CPAP, oxygen dependence, and Parkinson's disease. Patient presented to the ED on 1/17/2023 for increased shortness of breath over the past 2 days. Patient placed on BiPAP on presentation to the ED and initial ABG was likely mixed or venous. Repeat ABG on AVAPS showed a pH of 7.257, PCO2 90, PO2 183.4, HCO3 39.2, and SPO2 98.6%. The patient also received 1 L normal saline bolus and midodrine 10 mg p.o. x1 for hypotension which the patient takes at the nursing home 3 times daily.   The patient did respond well to the IV fluids and does not require vasopressor support at this time. Lactic acid normal at 1.7. According to the patient's family she is supposed to wear CPAP at night but is noncompliant. She uses supplemental oxygen as needed. Portable chest x-ray shows stable right pleural effusion and no other acute cardiopulmonary process.     ALLERGY:  Ciprofloxacin, Codeine, and Digoxin and related            MEDICAL HISTORY:  Past Medical History:   Diagnosis Date    A-fib ([facility]     Acute on chronic congestive heart failure (HCC)     Anxiety     Asthma     CAD (coronary artery disease) 01/21/2016    Cancer ([facility]  breast ca 2006    right lumpectomy    Chronic kidney disease     nephrolithiasis    COPD exacerbation ([facility] 10/12/2022    Depression     Diabetes mellitus ([facility]     H/O mammogram     Hx MRSA infection     toe infection january 2012    Hyperlipidemia     Hypertension     Lateral epicondylitis     Morbid obesity (HCC)     SERENA on CPAP     Oxygen dependent     Parkinson's disease (HCC)     Tubal ligation status        MEDICATIONS:   metOLazone  5 mg Oral Daily    linezolid  600 mg Oral 2 times per day    [Held by provider] dilTIAZem  60 mg Oral 3 times per day    sucralfate  1 g Oral 4x Daily    rOPINIRole  1 mg Oral TID    atorvastatin  20 mg Oral Nightly    ipratropium-albuterol  1 vial Inhalation 4x Daily    insulin glargine  13 Units SubCUTAneous BID    [Held by provider] LORazepam  0.5 mg Oral Nightly    metoprolol succinate  25 mg Oral BID    insulin lispro  0-4 Units SubCUTAneous TID WC    insulin lispro  0-4 Units SubCUTAneous Nightly    budesonide  0.5 mg Nebulization BID    sodium chloride flush  10 mL IntraVENous 2 times per day    miconazole   Topical BID    arformoterol tartrate  15 mcg Nebulization BID    pantoprazole  40 mg Oral QAM AC    apixaban  5 mg Oral BID    mirtazapine  7.5 mg Oral Nightly    aspirin  81 mg Oral Daily    carbidopa-levodopa  1 tablet Oral TID    sertraline  50 mg Oral Daily montelukast  10 mg Oral Nightly    [Held by provider] midodrine  5 mg Oral TID WC      sodium chloride      dextrose           PHYSICAL EXAM:  Vitals:    01/25/23 0948   BP:    Pulse:    Resp: 23   Temp:    SpO2:      FiO2 : 40 %  O2 Flow Rate (L/min): 4 L/min  O2 Device: PAP (positive airway pressure)    Constitutional: No fever, chills, diaphoresis  HEENT: No head lesions, PERRL, EOMI, mouth without lesions, no nasal lesions, no cervical adenopathy palpated   Respiratory: Lungs with equal breath sounds bilaterally diminished with no  wheezes, no accessory muscle use   CV: Rapid rate and irregular rhythm, no murmurs, JVD, bilateral leg edema  Abdomen: Soft, obese, non tender, + bowel sounds, no lesions   Skin: Adequate turgor, no rash, capillary refill <2 seconds, rash noted to bilateral lower abdominal folds, wound noted to left anterior shin  Extremities: Muscular strength 4/4 in 4 limbs, moves 4 limbs spontaneously, distal pulses present   Neurology: Awake and alert, follows commands, moves 4 limbs on command and spontaneously, equal sensation, no dysmetria, neck is supple, no meningitic signs present.       LABS:  WBC   Date Value Ref Range Status   01/22/2023 7.1 4.5 - 11.5 E9/L Final   01/20/2023 8.9 4.5 - 11.5 E9/L Final   01/19/2023 10.6 4.5 - 11.5 E9/L Final     Hemoglobin   Date Value Ref Range Status   01/22/2023 8.2 (L) 11.5 - 15.5 g/dL Final   01/20/2023 8.0 (L) 11.5 - 15.5 g/dL Final   01/19/2023 7.7 (L) 11.5 - 15.5 g/dL Final     Hematocrit   Date Value Ref Range Status   01/22/2023 30.8 (L) 34.0 - 48.0 % Final   01/20/2023 30.1 (L) 34.0 - 48.0 % Final   01/19/2023 26.5 (L) 34.0 - 48.0 % Final     MCV   Date Value Ref Range Status   01/22/2023 93.1 80.0 - 99.9 fL Final   01/20/2023 95.0 80.0 - 99.9 fL Final   01/19/2023 91.1 80.0 - 99.9 fL Final     Platelets   Date Value Ref Range Status   01/22/2023 189 130 - 450 E9/L Final   01/20/2023 231 130 - 450 E9/L Final   01/19/2023 210 130 - 450 E9/L Final Sodium   Date Value Ref Range Status   01/25/2023 146 132 - 146 mmol/L Final   01/24/2023 146 132 - 146 mmol/L Final   01/23/2023 146 132 - 146 mmol/L Final     Potassium   Date Value Ref Range Status   01/25/2023 4.0 3.5 - 5.0 mmol/L Final   01/24/2023 4.0 3.5 - 5.0 mmol/L Final   01/23/2023 4.2 3.5 - 5.0 mmol/L Final     Potassium reflex Magnesium   Date Value Ref Range Status   01/20/2023 4.8 3.5 - 5.0 mmol/L Final   01/19/2023 4.9 3.5 - 5.0 mmol/L Final   01/18/2023 5.2 (H) 3.5 - 5.0 mmol/L Final     Chloride   Date Value Ref Range Status   01/25/2023 103 98 - 107 mmol/L Final   01/24/2023 102 98 - 107 mmol/L Final   01/23/2023 104 98 - 107 mmol/L Final     CO2   Date Value Ref Range Status   01/25/2023 35 (H) 22 - 29 mmol/L Final   01/24/2023 32 (H) 22 - 29 mmol/L Final   01/23/2023 34 (H) 22 - 29 mmol/L Final     BUN   Date Value Ref Range Status   01/25/2023 56 (H) 6 - 23 mg/dL Final   01/24/2023 55 (H) 6 - 23 mg/dL Final   01/23/2023 53 (H) 6 - 23 mg/dL Final     Creatinine   Date Value Ref Range Status   01/25/2023 2.2 (H) 0.5 - 1.0 mg/dL Final   01/24/2023 2.2 (H) 0.5 - 1.0 mg/dL Final   01/23/2023 2.1 (H) 0.5 - 1.0 mg/dL Final     Glucose   Date Value Ref Range Status   01/25/2023 127 (H) 74 - 99 mg/dL Final   01/24/2023 114 (H) 74 - 99 mg/dL Final   01/23/2023 80 74 - 99 mg/dL Final     Calcium   Date Value Ref Range Status   01/25/2023 8.9 8.6 - 10.2 mg/dL Final   01/24/2023 8.8 8.6 - 10.2 mg/dL Final   01/23/2023 8.7 8.6 - 10.2 mg/dL Final     Total Protein   Date Value Ref Range Status   01/17/2023 6.1 (L) 6.4 - 8.3 g/dL Final   01/16/2023 6.3 (L) 6.4 - 8.3 g/dL Final   12/30/2022 5.8 (L) 6.4 - 8.3 g/dL Final     Albumin   Date Value Ref Range Status   01/17/2023 3.6 3.5 - 5.2 g/dL Final   01/16/2023 3.8 3.5 - 5.2 g/dL Final   12/30/2022 3.6 3.5 - 5.2 g/dL Final     Total Bilirubin   Date Value Ref Range Status   01/17/2023 0.5 0.0 - 1.2 mg/dL Final   01/16/2023 0.4 0.0 - 1.2 mg/dL Final 12/30/2022 0.4 0.0 - 1.2 mg/dL Final     Alkaline Phosphatase   Date Value Ref Range Status   01/17/2023 98 35 - 104 U/L Final   01/16/2023 96 35 - 104 U/L Final   12/30/2022 94 35 - 104 U/L Final     AST   Date Value Ref Range Status   01/17/2023 9 0 - 31 U/L Final   01/16/2023 10 0 - 31 U/L Final   12/30/2022 11 0 - 31 U/L Final     ALT   Date Value Ref Range Status   01/17/2023 <5 0 - 32 U/L Final   01/16/2023 <5 0 - 32 U/L Final   12/30/2022 <5 0 - 32 U/L Final     Est, Glom Filt Rate   Date Value Ref Range Status   01/25/2023 23 >=60 mL/min/1.73 Final     Comment:     Pediatric calculator link  [email or web address]_[email or web address]  Effective Oct 3, 2022  These results are not intended for use in patients  <25years of age. eGFR results are calculated without  a race factor using the 2021 CKD-EPI equation. Careful  clinical correlation is recommended, particularly when  comparing to results calculated using previous equations. The CKD-EPI equation is less accurate in patients with  extremes of muscle mass, extra-renal metabolism of  creatinine, excessive creatinine ingestion, or following  therapy that affects renal tubular secretion. 01/24/2023 23 >=60 mL/min/1.73 Final     Comment:     Pediatric calculator link  [email or web address]  Effective Oct 3, 2022  These results are not intended for use in patients  <25years of age. eGFR results are calculated without  a race factor using the 2021 CKD-EPI equation. Careful  clinical correlation is recommended, particularly when  comparing to results calculated using previous equations. The CKD-EPI equation is less accurate in patients with  extremes of muscle mass, extra-renal metabolism of  creatinine, excessive creatinine ingestion, or following  therapy that affects renal tubular secretion.      01/23/2023 24 >=60 mL/min/1.73 Final     Comment:     Pediatric calculator link  [email or web address]. [email or web address]_[email or web address]  Effective Oct 3, 2022  These results are not intended for use in patients  <25years of age. eGFR results are calculated without  a race factor using the 2021 CKD-EPI equation. Careful  clinical correlation is recommended, particularly when  comparing to results calculated using previous equations. The CKD-EPI equation is less accurate in patients with  extremes of muscle mass, extra-renal metabolism of  creatinine, excessive creatinine ingestion, or following  therapy that affects renal tubular secretion. GFR    Date Value Ref Range Status   10/16/2022 >60  Final   10/14/2022 59  Final   10/13/2022 >60  Final     Magnesium   Date Value Ref Range Status   12/30/2022 2.0 1.6 - 2.6 mg/dL Final   10/14/2022 2.1 1.6 - 2.6 mg/dL Final   10/08/2022 1.9 1.6 - 2.6 mg/dL Final     Phosphorus   Date Value Ref Range Status   01/20/2023 4.7 (H) 2.5 - 4.5 mg/dL Final   01/19/2023 4.9 (H) 2.5 - 4.5 mg/dL Final   01/18/2023 5.6 (H) 2.5 - 4.5 mg/dL Final     No results for input(s): PH, PO2, PCO2, HCO3, BE, O2SAT in the last 72 hours. RADIOLOGY:  CT ABDOMEN PELVIS WO CONTRAST Additional Contrast? None   Final Result   No nephroureterolithiasis or hydronephrosis. Splenomegaly. Small volume of perihepatic ascites. At least moderate-sized partially imaged right pleural effusion with   bibasilar atelectasis. Several locules of gas which appear to be within the endometrium at the level   of the uterine fundus, of uncertain etiology or clinical significance. Please correlate clinically. Diffuse anasarca throughout the soft tissues. Cardiomegaly. XR CHEST PORTABLE   Final Result   Stable right pleural effusion.                Electronically signed by Drea Bojorquez MD on 1/25/2023 at 12:58 PM

## 2023-01-25 NOTE — PROGRESS NOTES
Department of Internal Medicine  General Internal Medicine  Attending Progress Note  Chief Complaint   Patient presents with    Respiratory Distress     Normally on 3L NC, came in on CPAP, given total of 50mcg push dose epi for low BP by EMS     SUBJECTIVE:    Reports improved breathing. Noted poor appetite. No fever or chills. No chest pain. Aware of plans to continue to diurese her.     OBJECTIVE      Medications    Current Facility-Administered Medications: metOLazone (ZAROXOLYN) tablet 5 mg, 5 mg, Oral, Daily  linezolid (ZYVOX) tablet 600 mg, 600 mg, Oral, 2 times per day  [Held by provider] dilTIAZem (CARDIZEM) tablet 60 mg, 60 mg, Oral, 3 times per day  sucralfate (CARAFATE) tablet 1 g, 1 g, Oral, 4x Daily  rOPINIRole (REQUIP) tablet 1 mg, 1 mg, Oral, TID  atorvastatin (LIPITOR) tablet 20 mg, 20 mg, Oral, Nightly  ipratropium-albuterol (DUONEB) nebulizer solution 3 mL, 1 vial, Inhalation, 4x Daily  insulin glargine (LANTUS) injection vial 13 Units, 13 Units, SubCUTAneous, BID  loperamide (IMODIUM) capsule 2 mg, 2 mg, Oral, 4x Daily PRN  [Held by provider] LORazepam (ATIVAN) tablet 0.5 mg, 0.5 mg, Oral, Nightly  metoprolol succinate (TOPROL XL) extended release tablet 25 mg, 25 mg, Oral, BID  insulin lispro (HUMALOG) injection vial 0-4 Units, 0-4 Units, SubCUTAneous, TID WC  insulin lispro (HUMALOG) injection vial 0-4 Units, 0-4 Units, SubCUTAneous, Nightly  budesonide (PULMICORT) nebulizer suspension 500 mcg, 0.5 mg, Nebulization, BID  sodium chloride flush 0.9 % injection 10 mL, 10 mL, IntraVENous, 2 times per day  sodium chloride flush 0.9 % injection 10 mL, 10 mL, IntraVENous, PRN  0.9 % sodium chloride infusion, , IntraVENous, PRN  promethazine (PHENERGAN) tablet 12.5 mg, 12.5 mg, Oral, Q6H PRN **OR** ondansetron (ZOFRAN) injection 4 mg, 4 mg, IntraVENous, Q6H PRN  polyethylene glycol (GLYCOLAX) packet 17 g, 17 g, Oral, Daily PRN  acetaminophen (TYLENOL) tablet 650 mg, 650 mg, Oral, Q6H PRN **OR** acetaminophen (TYLENOL) suppository 650 mg, 650 mg, Rectal, Q6H PRN  miconazole (MICOTIN) 2 % powder, , Topical, BID  arformoterol tartrate (BROVANA) nebulizer solution 15 mcg, 15 mcg, Nebulization, BID  pantoprazole (PROTONIX) tablet 40 mg, 40 mg, Oral, QAM AC  glucose chewable tablet 16 g, 4 tablet, Oral, PRN  dextrose bolus 10% 125 mL, 125 mL, IntraVENous, PRN **OR** dextrose bolus 10% 250 mL, 250 mL, IntraVENous, PRN  glucagon (rDNA) injection 1 mg, 1 mg, SubCUTAneous, PRN  dextrose 10 % infusion, , IntraVENous, Continuous PRN  apixaban (ELIQUIS) tablet 5 mg, 5 mg, Oral, BID  mirtazapine (REMERON) tablet 7.5 mg, 7.5 mg, Oral, Nightly  aspirin EC tablet 81 mg, 81 mg, Oral, Daily  carbidopa-levodopa (SINEMET CR)  MG per extended release tablet 1 tablet, 1 tablet, Oral, TID  sertraline (ZOLOFT) tablet 50 mg, 50 mg, Oral, Daily  montelukast (SINGULAIR) tablet 10 mg, 10 mg, Oral, Nightly  [Held by provider] midodrine (PROAMATINE) tablet 5 mg, 5 mg, Oral, TID WC  Physical    VITALS:  BP 97/62   Pulse 78   Temp 98.2 °F (36.8 °C) (Axillary)   Resp 23   Ht 5' (1.524 m)   Wt 285 lb 6.4 oz (129.5 kg)   SpO2 96%   BMI 55.74 kg/m²   CONSTITUTIONAL:  awake, cooperative, and no apparent distress  EYES:  extra-ocular muscles intact  ENT:  normocepalic, without obvious abnormality  NECK:  supple, symmetrical, trachea midline  LUNGS:  no increased work of breathing, no retractions, and clear to auscultation  CARDIOVASCULAR:  normal apical pulses and normal S1 and S2  ABDOMEN:  normal bowel sounds, non-distended, and non-tender  MUSCULOSKELETAL:  full range of motion noted, bilateral lower extremity edema  NEUROLOGIC:  Mental Status Exam:  Level of Alertness:   awake  Orientation:   person, place, time  SKIN:  no bruising or bleeding  Data    CBC:   Lab Results   Component Value Date/Time    WBC 7.1 01/22/2023 11:07 AM    RBC 3.31 01/22/2023 11:07 AM    HGB 8.2 01/22/2023 11:07 AM    HCT 30.8 01/22/2023 11:07 AM MCV 93.1 01/22/2023 11:07 AM    MCH 24.8 01/22/2023 11:07 AM    MCHC 26.6 01/22/2023 11:07 AM    RDW 15.9 01/22/2023 11:07 AM     01/22/2023 11:07 AM    MPV 10.8 01/22/2023 11:07 AM     BMP:    Lab Results   Component Value Date/Time     01/25/2023 07:55 AM    K 4.0 01/25/2023 07:55 AM    K 4.8 01/20/2023 05:07 AM     01/25/2023 07:55 AM    CO2 35 01/25/2023 07:55 AM    BUN 56 01/25/2023 07:55 AM    LABALBU 3.6 01/17/2023 05:23 PM    CREATININE 2.2 01/25/2023 07:55 AM    CALCIUM 8.9 01/25/2023 07:55 AM    GFRAA >60 10/16/2022 09:20 AM    LABGLOM 23 01/25/2023 07:55 AM    GLUCOSE 127 01/25/2023 07:55 AM       ASSESSMENT AND PLAN      Acute on chronic respiratory failure with hypoxia and hypercapnia: bipap as ordered. Continue to monitor. Breathing treatment. Pulm following  Hypertension Essential: continue home meds  DM type 2 Complicated with Nephropathy: BGL is better controlled. Continue current insulin regimen  CHF with systolic dysfunction: continue diuretic while cautious of renal functions  CKD stage 3: seems to be at base line. Continue to monitor. COPD: not in exacerbation  UTI abx per ID.  Continue Zyvox  Continue [address] [place] [address]

## 2023-01-26 LAB
ANION GAP SERPL CALCULATED.3IONS-SCNC: 9 MMOL/L (ref 7–16)
BASOPHILS ABSOLUTE: 0.1 E9/L (ref 0–0.2)
BASOPHILS RELATIVE PERCENT: 0.9 % (ref 0–2)
BUN BLDV-MCNC: 55 MG/DL (ref 6–23)
CALCIUM SERPL-MCNC: 9.2 MG/DL (ref 8.6–10.2)
CHLORIDE BLD-SCNC: 99 MMOL/L (ref 98–107)
CO2: 38 MMOL/L (ref 22–29)
CREAT SERPL-MCNC: 2.2 MG/DL (ref 0.5–1)
EOSINOPHILS ABSOLUTE: 0.1 E9/L (ref 0.05–0.5)
EOSINOPHILS RELATIVE PERCENT: 0.9 % (ref 0–6)
GFR SERPL CREATININE-BSD FRML MDRD: 23 ML/MIN/1.73
GLUCOSE BLD-MCNC: 101 MG/DL (ref 74–99)
HCT VFR BLD CALC: 34.6 % (ref 34–48)
HEMOGLOBIN: 9.2 G/DL (ref 11.5–15.5)
HYPOCHROMIA: ABNORMAL
LYMPHOCYTES ABSOLUTE: 1.8 E9/L (ref 1.5–4)
LYMPHOCYTES RELATIVE PERCENT: 17.2 % (ref 20–42)
MCH RBC QN AUTO: 25.1 PG (ref 26–35)
MCHC RBC AUTO-ENTMCNC: 26.6 % (ref 32–34.5)
MCV RBC AUTO: 94.3 FL (ref 80–99.9)
METAMYELOCYTES RELATIVE PERCENT: 0.9 % (ref 0–1)
METER GLUCOSE: 115 MG/DL (ref 74–99)
METER GLUCOSE: 120 MG/DL (ref 74–99)
METER GLUCOSE: 130 MG/DL (ref 74–99)
METER GLUCOSE: 156 MG/DL (ref 74–99)
MONOCYTES ABSOLUTE: 0.11 E9/L (ref 0.1–0.95)
MONOCYTES RELATIVE PERCENT: 0.9 % (ref 2–12)
NEUTROPHILS ABSOLUTE: 8.48 E9/L (ref 1.8–7.3)
NEUTROPHILS RELATIVE PERCENT: 79.3 % (ref 43–80)
OVALOCYTES: ABNORMAL
PDW BLD-RTO: 15.9 FL (ref 11.5–15)
PLATELET # BLD: 169 E9/L (ref 130–450)
PMV BLD AUTO: 11.4 FL (ref 7–12)
POIKILOCYTES: ABNORMAL
POLYCHROMASIA: ABNORMAL
POTASSIUM SERPL-SCNC: 3.6 MMOL/L (ref 3.5–5)
RBC # BLD: 3.67 E12/L (ref 3.5–5.5)
SODIUM BLD-SCNC: 146 MMOL/L (ref 132–146)
WBC # BLD: 10.6 E9/L (ref 4.5–11.5)

## 2023-01-26 PROCEDURE — 85025 COMPLETE CBC W/AUTO DIFF WBC: CPT

## 2023-01-26 PROCEDURE — 6370000000 HC RX 637 (ALT 250 FOR IP): Performed by: INTERNAL MEDICINE

## 2023-01-26 PROCEDURE — 36415 COLL VENOUS BLD VENIPUNCTURE: CPT

## 2023-01-26 PROCEDURE — 99232 SBSQ HOSP IP/OBS MODERATE 35: CPT | Performed by: INTERNAL MEDICINE

## 2023-01-26 PROCEDURE — 2580000003 HC RX 258: Performed by: INTERNAL MEDICINE

## 2023-01-26 PROCEDURE — 82962 GLUCOSE BLOOD TEST: CPT

## 2023-01-26 PROCEDURE — 6360000002 HC RX W HCPCS: Performed by: NURSE PRACTITIONER

## 2023-01-26 PROCEDURE — 6370000000 HC RX 637 (ALT 250 FOR IP): Performed by: NURSE PRACTITIONER

## 2023-01-26 PROCEDURE — 97110 THERAPEUTIC EXERCISES: CPT

## 2023-01-26 PROCEDURE — 94640 AIRWAY INHALATION TREATMENT: CPT

## 2023-01-26 PROCEDURE — 80048 BASIC METABOLIC PNL TOTAL CA: CPT

## 2023-01-26 PROCEDURE — 2500000003 HC RX 250 WO HCPCS: Performed by: INTERNAL MEDICINE

## 2023-01-26 PROCEDURE — 2700000000 HC OXYGEN THERAPY PER DAY

## 2023-01-26 PROCEDURE — 94660 CPAP INITIATION&MGMT: CPT

## 2023-01-26 PROCEDURE — 2060000000 HC ICU INTERMEDIATE R&B

## 2023-01-26 PROCEDURE — 6360000002 HC RX W HCPCS: Performed by: INTERNAL MEDICINE

## 2023-01-26 PROCEDURE — 76937 US GUIDE VASCULAR ACCESS: CPT

## 2023-01-26 RX ADMIN — ACETAZOLAMIDE 500 MG: 500 INJECTION, POWDER, LYOPHILIZED, FOR SOLUTION INTRAVENOUS at 15:01

## 2023-01-26 RX ADMIN — BUDESONIDE 500 MCG: 0.5 SUSPENSION RESPIRATORY (INHALATION) at 06:51

## 2023-01-26 RX ADMIN — BUMETANIDE 0.75 MG/HR: 0.25 INJECTION INTRAMUSCULAR; INTRAVENOUS at 17:13

## 2023-01-26 RX ADMIN — ARFORMOTEROL TARTRATE 15 MCG: 15 SOLUTION RESPIRATORY (INHALATION) at 18:10

## 2023-01-26 RX ADMIN — METOPROLOL SUCCINATE 25 MG: 25 TABLET, FILM COATED, EXTENDED RELEASE ORAL at 08:49

## 2023-01-26 RX ADMIN — APIXABAN 5 MG: 5 TABLET, FILM COATED ORAL at 20:49

## 2023-01-26 RX ADMIN — INSULIN GLARGINE 13 UNITS: 100 INJECTION, SOLUTION SUBCUTANEOUS at 20:48

## 2023-01-26 RX ADMIN — SERTRALINE 50 MG: 50 TABLET, FILM COATED ORAL at 08:49

## 2023-01-26 RX ADMIN — IPRATROPIUM BROMIDE AND ALBUTEROL SULFATE 3 ML: .5; 2.5 SOLUTION RESPIRATORY (INHALATION) at 06:51

## 2023-01-26 RX ADMIN — LINEZOLID 600 MG: 600 TABLET, FILM COATED ORAL at 08:50

## 2023-01-26 RX ADMIN — ANTI-FUNGAL POWDER MICONAZOLE NITRATE TALC FREE: 1.42 POWDER TOPICAL at 13:24

## 2023-01-26 RX ADMIN — IPRATROPIUM BROMIDE AND ALBUTEROL SULFATE 3 ML: .5; 2.5 SOLUTION RESPIRATORY (INHALATION) at 18:10

## 2023-01-26 RX ADMIN — ANTI-FUNGAL POWDER MICONAZOLE NITRATE TALC FREE: 1.42 POWDER TOPICAL at 20:51

## 2023-01-26 RX ADMIN — SUCRALFATE 1 G: 1 TABLET ORAL at 17:10

## 2023-01-26 RX ADMIN — Medication 10 ML: at 20:50

## 2023-01-26 RX ADMIN — CARBIDOPA AND LEVODOPA 1 TABLET: 50; 200 TABLET, EXTENDED RELEASE ORAL at 15:19

## 2023-01-26 RX ADMIN — CARBIDOPA AND LEVODOPA 1 TABLET: 50; 200 TABLET, EXTENDED RELEASE ORAL at 08:50

## 2023-01-26 RX ADMIN — ARFORMOTEROL TARTRATE 15 MCG: 15 SOLUTION RESPIRATORY (INHALATION) at 06:51

## 2023-01-26 RX ADMIN — MIRTAZAPINE 7.5 MG: 15 TABLET, FILM COATED ORAL at 20:49

## 2023-01-26 RX ADMIN — FUROSEMIDE 60 MG: 10 INJECTION, SOLUTION INTRAMUSCULAR; INTRAVENOUS at 08:47

## 2023-01-26 RX ADMIN — ASPIRIN 81 MG: 81 TABLET, COATED ORAL at 08:49

## 2023-01-26 RX ADMIN — ROPINIROLE HYDROCHLORIDE 1 MG: 1 TABLET, FILM COATED ORAL at 20:49

## 2023-01-26 RX ADMIN — SUCRALFATE 1 G: 1 TABLET ORAL at 08:49

## 2023-01-26 RX ADMIN — CARBIDOPA AND LEVODOPA 1 TABLET: 50; 200 TABLET, EXTENDED RELEASE ORAL at 20:49

## 2023-01-26 RX ADMIN — APIXABAN 5 MG: 5 TABLET, FILM COATED ORAL at 08:50

## 2023-01-26 RX ADMIN — PANTOPRAZOLE SODIUM 40 MG: 40 TABLET, DELAYED RELEASE ORAL at 05:46

## 2023-01-26 RX ADMIN — IPRATROPIUM BROMIDE AND ALBUTEROL SULFATE 3 ML: .5; 2.5 SOLUTION RESPIRATORY (INHALATION) at 10:24

## 2023-01-26 RX ADMIN — SUCRALFATE 1 G: 1 TABLET ORAL at 13:21

## 2023-01-26 RX ADMIN — METOPROLOL SUCCINATE 25 MG: 25 TABLET, FILM COATED, EXTENDED RELEASE ORAL at 20:49

## 2023-01-26 RX ADMIN — BUDESONIDE 500 MCG: 0.5 SUSPENSION RESPIRATORY (INHALATION) at 18:10

## 2023-01-26 RX ADMIN — LINEZOLID 600 MG: 600 TABLET, FILM COATED ORAL at 20:49

## 2023-01-26 RX ADMIN — ROPINIROLE HYDROCHLORIDE 1 MG: 1 TABLET, FILM COATED ORAL at 13:21

## 2023-01-26 RX ADMIN — SUCRALFATE 1 G: 1 TABLET ORAL at 20:49

## 2023-01-26 RX ADMIN — MONTELUKAST SODIUM 10 MG: 10 TABLET, FILM COATED ORAL at 20:49

## 2023-01-26 RX ADMIN — ATORVASTATIN CALCIUM 20 MG: 20 TABLET, FILM COATED ORAL at 20:49

## 2023-01-26 RX ADMIN — IPRATROPIUM BROMIDE AND ALBUTEROL SULFATE 3 ML: .5; 2.5 SOLUTION RESPIRATORY (INHALATION) at 14:33

## 2023-01-26 RX ADMIN — Medication 10 ML: at 08:59

## 2023-01-26 RX ADMIN — INSULIN GLARGINE 13 UNITS: 100 INJECTION, SOLUTION SUBCUTANEOUS at 08:48

## 2023-01-26 RX ADMIN — METOLAZONE 5 MG: 2.5 TABLET ORAL at 08:49

## 2023-01-26 RX ADMIN — ROPINIROLE HYDROCHLORIDE 1 MG: 1 TABLET, FILM COATED ORAL at 08:49

## 2023-01-26 ASSESSMENT — PAIN SCALES - GENERAL
PAINLEVEL_OUTOF10: 0

## 2023-01-26 NOTE — CARE COORDINATION
SS NOTE: COVID NEGATIVE 1/17, PT WILL NEED A RAPID NEGATIVE COVID TEST TO RETURN TO [facility]. Pt is on 5 liters of O2 vs bipap at 40%. Pt has 2+ pitting edema  Pt has positive blood cultures. Pt has a peripheral line. She is on IV Lasix twice daily and oral Zyvox. She has a genao. She has a wound on her left lower leg. Pt is going to return to long term care at [address] [facility] when dched. Pt is out of skilled days there and they will activate Medicaid for return there. For the return pt will need NO PRECERT, and will need a signed [place]. In regard to pt's concern that the Bipap here is more effective that the bipap at the [facility]. SW shared pt's bipap settings with [facility] and their settings was different- that set up the bipap there to be same setting as here. SS to continue. ESTHER Doty.1/26/2023.11:14AM

## 2023-01-26 NOTE — PROGRESS NOTES
Department of Internal Medicine  General Internal Medicine  Attending Progress Note  Chief Complaint   Patient presents with    Respiratory Distress     Normally on 3L NC, came in on CPAP, given total of 50mcg push dose epi for low BP by EMS     SUBJECTIVE:    Reports that she feels tired. On bipap during eval. Denied chest pain. Aware of plans for fluid management.     OBJECTIVE      Medications    Current Facility-Administered Medications: bumetanide (BUMEX) 12.5 mg in sodium chloride 0.9 % 125 mL infusion, 0.75 mg/hr, IntraVENous, Continuous  acetaZOLAMIDE (DIAMOX) 500 mg in sodium chloride 0.9 % 100 mL IVPB, 500 mg, IntraVENous, Q12H  linezolid (ZYVOX) tablet 600 mg, 600 mg, Oral, 2 times per day  [Held by provider] dilTIAZem (CARDIZEM) tablet 60 mg, 60 mg, Oral, 3 times per day  sucralfate (CARAFATE) tablet 1 g, 1 g, Oral, 4x Daily  rOPINIRole (REQUIP) tablet 1 mg, 1 mg, Oral, TID  atorvastatin (LIPITOR) tablet 20 mg, 20 mg, Oral, Nightly  ipratropium-albuterol (DUONEB) nebulizer solution 3 mL, 1 vial, Inhalation, 4x Daily  insulin glargine (LANTUS) injection vial 13 Units, 13 Units, SubCUTAneous, BID  loperamide (IMODIUM) capsule 2 mg, 2 mg, Oral, 4x Daily PRN  [Held by provider] LORazepam (ATIVAN) tablet 0.5 mg, 0.5 mg, Oral, Nightly  metoprolol succinate (TOPROL XL) extended release tablet 25 mg, 25 mg, Oral, BID  insulin lispro (HUMALOG) injection vial 0-4 Units, 0-4 Units, SubCUTAneous, TID WC  insulin lispro (HUMALOG) injection vial 0-4 Units, 0-4 Units, SubCUTAneous, Nightly  budesonide (PULMICORT) nebulizer suspension 500 mcg, 0.5 mg, Nebulization, BID  sodium chloride flush 0.9 % injection 10 mL, 10 mL, IntraVENous, 2 times per day  sodium chloride flush 0.9 % injection 10 mL, 10 mL, IntraVENous, PRN  0.9 % sodium chloride infusion, , IntraVENous, PRN  promethazine (PHENERGAN) tablet 12.5 mg, 12.5 mg, Oral, Q6H PRN **OR** ondansetron (ZOFRAN) injection 4 mg, 4 mg, IntraVENous, Q6H PRN  polyethylene glycol (GLYCOLAX) packet 17 g, 17 g, Oral, Daily PRN  acetaminophen (TYLENOL) tablet 650 mg, 650 mg, Oral, Q6H PRN **OR** acetaminophen (TYLENOL) suppository 650 mg, 650 mg, Rectal, Q6H PRN  miconazole (MICOTIN) 2 % powder, , Topical, BID  arformoterol tartrate (BROVANA) nebulizer solution 15 mcg, 15 mcg, Nebulization, BID  pantoprazole (PROTONIX) tablet 40 mg, 40 mg, Oral, QAM AC  glucose chewable tablet 16 g, 4 tablet, Oral, PRN  dextrose bolus 10% 125 mL, 125 mL, IntraVENous, PRN **OR** dextrose bolus 10% 250 mL, 250 mL, IntraVENous, PRN  glucagon (rDNA) injection 1 mg, 1 mg, SubCUTAneous, PRN  dextrose 10 % infusion, , IntraVENous, Continuous PRN  apixaban (ELIQUIS) tablet 5 mg, 5 mg, Oral, BID  mirtazapine (REMERON) tablet 7.5 mg, 7.5 mg, Oral, Nightly  aspirin EC tablet 81 mg, 81 mg, Oral, Daily  carbidopa-levodopa (SINEMET CR)  MG per extended release tablet 1 tablet, 1 tablet, Oral, TID  sertraline (ZOLOFT) tablet 50 mg, 50 mg, Oral, Daily  montelukast (SINGULAIR) tablet 10 mg, 10 mg, Oral, Nightly  [Held by provider] midodrine (PROAMATINE) tablet 5 mg, 5 mg, Oral, TID WC  Physical    VITALS:  BP (!) 157/97   Pulse 100   Temp 97.6 °F (36.4 °C) (Oral)   Resp 22   Ht 5' (1.524 m)   Wt 282 lb 12.8 oz (128.3 kg)   SpO2 94%   BMI 55.23 kg/m²   CONSTITUTIONAL:  awake, cooperative, and no apparent distress  EYES:  extra-ocular muscles intact  ENT:  normocepalic, without obvious abnormality  NECK:  supple, symmetrical, trachea midline  LUNGS:  no increased work of breathing, no retractions, and clear to auscultation  CARDIOVASCULAR:  normal apical pulses and normal S1 and S2  ABDOMEN:  normal bowel sounds, non-distended, and non-tender  MUSCULOSKELETAL:  bilateral lower extremity edema  NEUROLOGIC:  Mental Status Exam:  Level of Alertness:   awake  Orientation:   person, place, time  SKIN:  no bruising or bleeding  Data    CBC:   Lab Results   Component Value Date/Time    WBC 10.6 01/26/2023 07:49 AM RBC 3.67 01/26/2023 07:49 AM    HGB 9.2 01/26/2023 07:49 AM    HCT 34.6 01/26/2023 07:49 AM    MCV 94.3 01/26/2023 07:49 AM    MCH 25.1 01/26/2023 07:49 AM    MCHC 26.6 01/26/2023 07:49 AM    RDW 15.9 01/26/2023 07:49 AM     01/26/2023 07:49 AM    MPV 11.4 01/26/2023 07:49 AM     BMP:    Lab Results   Component Value Date/Time     01/26/2023 07:49 AM    K 3.6 01/26/2023 07:49 AM    K 4.8 01/20/2023 05:07 AM    CL 99 01/26/2023 07:49 AM    CO2 38 01/26/2023 07:49 AM    BUN 55 01/26/2023 07:49 AM    LABALBU 3.6 01/17/2023 05:23 PM    CREATININE 2.2 01/26/2023 07:49 AM    CALCIUM 9.2 01/26/2023 07:49 AM    GFRAA >60 10/16/2022 09:20 AM    LABGLOM 23 01/26/2023 07:49 AM    GLUCOSE 101 01/26/2023 07:49 AM       ASSESSMENT AND PLAN      Acute on chronic respiratory failure with hypoxia and hypercapnia: bipap prn. Breathing treatment  DM type 2 complicated with nephropathy: diet control. Continue current insulin. Monitor BGL and sliding scale  Hypertension Essential: continue home meds  CKD stage 3: overall stable. Nephro following.   COPD: not in exacerbation. Continue to monitor  UTI: zyvox per ID  CHF with systolic dysfunction: continue diuretic. Monitor intake and output.

## 2023-01-26 NOTE — PROGRESS NOTES
[facility] MD Ovidio Colon MD Ria Gauze, MD Linwood Connors, CNP   Joyce Devlin, NUNU Sumner, BRADLEY  Progress Note    1/26/2023    SUBJECTIVE:   1/20: Feeling little better today. Denies new complaint. Still tachypnea, though denies dyspnea no cp/palp Appetite ok ROS otherwise unremarkable    1//21 seen in her room on o2/nc bp stable weak poor po intake   2+ edema in LE     1/22 charts reviewed . On bipap    1/23 : on o2/nc . Still look hypervolemic     1/24 seen in her room comfortable o2/nc . Still with LE edema which seems to be multifactorial and will likely need to accept having some edema on board     1/25 sitting on edge of the bed working with pt . Still with considerable edema in LEs   BP stable     1/26 seen in her room reports of SOB with minimal activity , LE edema still signficant . PROBLEM LIST:    Principal Problem:    Acute on chronic respiratory failure with hypoxia and hypercapnia (HCC)  Resolved Problems:    * No resolved hospital problems. *         DIET:    ADULT DIET; Regular; 3 carb choices (45 gm/meal); Low Fat/Low Chol/High Fiber/2 gm Na; Moderately Thick (Honey); 1800 ml  ADULT ORAL NUTRITION SUPPLEMENT; Dinner;  Other Oral Supplement; Gelatein 20     MEDS (scheduled):    acetaZOLAMIDE (DIAMOX) IVPB  500 mg IntraVENous Q12H    linezolid  600 mg Oral 2 times per day    [Held by provider] dilTIAZem  60 mg Oral 3 times per day    sucralfate  1 g Oral 4x Daily    rOPINIRole  1 mg Oral TID    atorvastatin  20 mg Oral Nightly    ipratropium-albuterol  1 vial Inhalation 4x Daily    insulin glargine  13 Units SubCUTAneous BID    [Held by provider] LORazepam  0.5 mg Oral Nightly    metoprolol succinate  25 mg Oral BID    insulin lispro  0-4 Units SubCUTAneous TID     insulin lispro  0-4 Units SubCUTAneous Nightly    budesonide  0.5 mg Nebulization BID    sodium chloride flush  10 mL IntraVENous 2 times per day    miconazole   Topical BID    arformoterol tartrate  15 mcg Nebulization BID    pantoprazole  40 mg Oral QAM AC    apixaban  5 mg Oral BID    mirtazapine  7.5 mg Oral Nightly    aspirin  81 mg Oral Daily    carbidopa-levodopa  1 tablet Oral TID    sertraline  50 mg Oral Daily    montelukast  10 mg Oral Nightly    [Held by provider] midodrine  5 mg Oral TID WC       MEDS (infusions):   bumetanide 0.1 mg/mL infusion 0.75 mg/hr (01/26/23 1713)    sodium chloride      dextrose         MEDS (prn):  loperamide, sodium chloride flush, sodium chloride, promethazine **OR** ondansetron, polyethylene glycol, acetaminophen **OR** acetaminophen, glucose, dextrose bolus **OR** dextrose bolus, glucagon (rDNA), dextrose    PHYSICAL EXAM:     Patient Vitals for the past 24 hrs:   BP Temp Temp src Pulse Resp SpO2 Weight   01/26/23 1519 (!) 172/101 97.5 °F (36.4 °C) Oral 100 18 95 % --   01/26/23 1026 -- -- -- -- 22 -- --   01/26/23 0744 (!) 157/97 97.6 °F (36.4 °C) Oral 100 20 94 % --   01/26/23 0653 -- -- -- -- 22 -- --   01/26/23 0546 -- -- -- -- -- -- 282 lb 12.8 oz (128.3 kg)   01/26/23 0351 100/74 98.2 °F (36.8 °C) Oral 90 22 96 % --   01/25/23 2353 -- -- -- 98 -- 92 % --   01/25/23 1937 122/64 98.4 °F (36.9 °C) Oral 98 20 92 % --     @      Intake/Output Summary (Last 24 hours) at 1/26/2023 1857  Last data filed at 1/26/2023 1659  Gross per 24 hour   Intake 600 ml   Output 2600 ml   Net -2000 ml           Wt Readings from Last 3 Encounters:   01/26/23 282 lb 12.8 oz (128.3 kg)   01/16/23 259 lb (117.5 kg)   01/16/23 259 lb (117.5 kg)     Age-appropriate morbidly obese white woman, though easily arousable, no apparent distress  NC/AT EOMI sclera conjunctive are clear and anicteric mucous membranes are moist  Neck soft supple trachea midline  Coarse breath sounds with crackles on the right, mildly prolonged expiratory phase but no wheeze.   Arguably a little more clear than yesterday  Regular rhythm normal S1 and S2  Abdomen soft nontender nondistended normal active bowel sounds. Abdominal pannus has substantial edema  Distal extremities, thighs, sacrum have substantial chronic type nonpitting edema, though no pitting edema  Pulses 1+ x4  Moves all 4 extremities  Cranial nerves II through XII grossly intact  Awake, alert, interactive and appropriate  Skin tone consistent with chronic venous stasis distally      DATA:    Recent Labs     01/26/23  0749   WBC 10.6   HGB 9.2*   HCT 34.6   MCV 94.3          Recent Labs     01/24/23  0825 01/25/23  0755 01/26/23  0749    146 146   K 4.0 4.0 3.6    103 99   CO2 32* 35* 38*   BUN 55* 56* 55*   CREATININE 2.2* 2.2* 2.2*         Lab Results   Component Value Date    LABPROT 0.5 (H) 01/18/2023    LABPROT 0.5 01/18/2023     On CT no hydro/signs of obstruction     Urine studies  U Na 21, U Cl 23 U prot:cr ratio 0.5    ASSESSMENT / RECOMMENDATIONS:       Assessment  1. Acute kidney injury urine lytes support decrease effective volume     2. CKD stage III, Baseline creatinine 1.4 to 1.7 mg/dL, secondary to diabetic nephropathy and renal microvascular atherosclerotic disease  0.5 g proteinuria     3. Anemia due to CKD, phlebotomy associated prolonged hospitalization     4. Acute hypercapnic and hypoxic respiratory failure due to COPD exacerbation and pneumonia. Does not appear hypervolemic. 5.  Morbid obesity, BMI 50.6 kg per metered square     6.   Edema/anasarca, chronic, multifactorial, due to venous insufficiency in the setting of morbid obesity, relative immobility, likely diastolic dysfunction though it was \"indeterminate\" on echo, the does have pulmonary hypertension, mild right-sided heart failure     Recommendations    Still look hypervolemic   Azotemia relatively stable      Switch to bumex drip   Add diamox bid for few doses   Compression stocking   Out of bed to chair   Fu serial bmps UO        Electronically signed by Claire Crump MD on 1/26/2023

## 2023-01-26 NOTE — PROGRESS NOTES
Pulmonary/Critical Care Progress Note    We are following patient for morbid obesity, severe COPD, obstructive sleep apnea, obesity hypoventilation syndrome, severe obstructive sleep apnea, diabetes mellitus, hyperlipidemia, systemic hypertension, pulmonary hypertension, CKD, history of breast cancer    SUBJECTIVE:  The patient is very comfortable on BiPAP and is doing fairly well with this. She says that she is wearing her BiPAP at home/nursing home but her legs have become quite swollen and she is on diuretic therapy for this currently. Her lower extremities have cellulitis and she is on Zyvox for this.     MEDICATIONS:   acetaZOLAMIDE (DIAMOX) IVPB  500 mg IntraVENous Q12H    linezolid  600 mg Oral 2 times per day    [Held by provider] dilTIAZem  60 mg Oral 3 times per day    sucralfate  1 g Oral 4x Daily    rOPINIRole  1 mg Oral TID    atorvastatin  20 mg Oral Nightly    ipratropium-albuterol  1 vial Inhalation 4x Daily    insulin glargine  13 Units SubCUTAneous BID    [Held by provider] LORazepam  0.5 mg Oral Nightly    metoprolol succinate  25 mg Oral BID    insulin lispro  0-4 Units SubCUTAneous TID WC    insulin lispro  0-4 Units SubCUTAneous Nightly    budesonide  0.5 mg Nebulization BID    sodium chloride flush  10 mL IntraVENous 2 times per day    miconazole   Topical BID    arformoterol tartrate  15 mcg Nebulization BID    pantoprazole  40 mg Oral QAM AC    apixaban  5 mg Oral BID    mirtazapine  7.5 mg Oral Nightly    aspirin  81 mg Oral Daily    carbidopa-levodopa  1 tablet Oral TID    sertraline  50 mg Oral Daily    montelukast  10 mg Oral Nightly    [Held by provider] midodrine  5 mg Oral TID WC      bumetanide 0.1 mg/mL infusion      sodium chloride      dextrose       loperamide, sodium chloride flush, sodium chloride, promethazine **OR** ondansetron, polyethylene glycol, acetaminophen **OR** acetaminophen, glucose, dextrose bolus **OR** dextrose bolus, glucagon (rDNA), dextrose      REVIEW OF SYSTEMS:  Constitutional: Denies fever, weight loss, night sweats, and fatigue  Skin: Denies pigmentation, dark lesions, and rashes   HEENT: Denies hearing loss, tinnitus, ear drainage, epistaxis, sore throat, and hoarseness. Cardiovascular: Denies palpitations, chest pain, and chest pressure. Respiratory: Denies cough, dyspnea at rest, hemoptysis, apnea, and choking. Gastrointestinal: Denies nausea, vomiting, poor appetite, diarrhea, heartburn or reflux  Genitourinary: Denies dysuria, frequency, urgency or hematuria  Musculoskeletal: Denies myalgias, muscle weakness, and bone pain  Neurological: Denies dizziness, vertigo, headache, and focal weakness  Psychological: Denies anxiety and depression  Endocrine: Denies heat intolerance and cold intolerance  Hematopoietic/Lymphatic: Denies bleeding problems and blood transfusions    OBJECTIVE:  Vitals:    01/26/23 1026   BP:    Pulse:    Resp: 22   Temp:    SpO2:      FiO2 : 40 %  O2 Flow Rate (L/min): 5 L/min  O2 Device: PAP (positive airway pressure)    PHYSICAL EXAM:  Constitutional: No fever, chills, diaphoresis  Skin: Bilateral lower extremity cellulitic changes with chronic venous stasis changes  HEENT: Small oropharyngeal opening  Neck: Large neck circumference, no JVD or lymphadenopathy  Cardiovascular: S1, S2 regular. No S3 or rubs present  Respiratory: Diminished breath sounds over both posterior lower lung fields  Gastrointestinal: Soft, markedly obese, nontender  Genitourinary: No CVA tenderness  Extremities: 2+ edema feet legs and ankles, I believe the edema is decreasing. No clubbing or cyanosis is present. Neurological: Awake, alert, oriented x3. Moves all extremities. No focal motor deficits  Psychological: In better spirits.   Affect appropriate    LABS:  WBC   Date Value Ref Range Status   01/26/2023 10.6 4.5 - 11.5 E9/L Final   01/22/2023 7.1 4.5 - 11.5 E9/L Final   01/20/2023 8.9 4.5 - 11.5 E9/L Final     Hemoglobin   Date Value Ref Range Status   01/26/2023 9.2 (L) 11.5 - 15.5 g/dL Final   01/22/2023 8.2 (L) 11.5 - 15.5 g/dL Final   01/20/2023 8.0 (L) 11.5 - 15.5 g/dL Final     Hematocrit   Date Value Ref Range Status   01/26/2023 34.6 34.0 - 48.0 % Final   01/22/2023 30.8 (L) 34.0 - 48.0 % Final   01/20/2023 30.1 (L) 34.0 - 48.0 % Final     MCV   Date Value Ref Range Status   01/26/2023 94.3 80.0 - 99.9 fL Final   01/22/2023 93.1 80.0 - 99.9 fL Final   01/20/2023 95.0 80.0 - 99.9 fL Final     Platelets   Date Value Ref Range Status   01/26/2023 169 130 - 450 E9/L Final   01/22/2023 189 130 - 450 E9/L Final   01/20/2023 231 130 - 450 E9/L Final     Sodium   Date Value Ref Range Status   01/26/2023 146 132 - 146 mmol/L Final   01/25/2023 146 132 - 146 mmol/L Final   01/24/2023 146 132 - 146 mmol/L Final     Potassium   Date Value Ref Range Status   01/26/2023 3.6 3.5 - 5.0 mmol/L Final   01/25/2023 4.0 3.5 - 5.0 mmol/L Final   01/24/2023 4.0 3.5 - 5.0 mmol/L Final     Potassium reflex Magnesium   Date Value Ref Range Status   01/20/2023 4.8 3.5 - 5.0 mmol/L Final   01/19/2023 4.9 3.5 - 5.0 mmol/L Final   01/18/2023 5.2 (H) 3.5 - 5.0 mmol/L Final     Chloride   Date Value Ref Range Status   01/26/2023 99 98 - 107 mmol/L Final   01/25/2023 103 98 - 107 mmol/L Final   01/24/2023 102 98 - 107 mmol/L Final     CO2   Date Value Ref Range Status   01/26/2023 38 (H) 22 - 29 mmol/L Final   01/25/2023 35 (H) 22 - 29 mmol/L Final   01/24/2023 32 (H) 22 - 29 mmol/L Final     BUN   Date Value Ref Range Status   01/26/2023 55 (H) 6 - 23 mg/dL Final   01/25/2023 56 (H) 6 - 23 mg/dL Final   01/24/2023 55 (H) 6 - 23 mg/dL Final     Creatinine   Date Value Ref Range Status   01/26/2023 2.2 (H) 0.5 - 1.0 mg/dL Final   01/25/2023 2.2 (H) 0.5 - 1.0 mg/dL Final   01/24/2023 2.2 (H) 0.5 - 1.0 mg/dL Final     Glucose   Date Value Ref Range Status   01/26/2023 101 (H) 74 - 99 mg/dL Final   01/25/2023 127 (H) 74 - 99 mg/dL Final   01/24/2023 114 (H) 74 - 99 mg/dL Final Calcium   Date Value Ref Range Status   01/26/2023 9.2 8.6 - 10.2 mg/dL Final   01/25/2023 8.9 8.6 - 10.2 mg/dL Final   01/24/2023 8.8 8.6 - 10.2 mg/dL Final     Total Protein   Date Value Ref Range Status   01/17/2023 6.1 (L) 6.4 - 8.3 g/dL Final   01/16/2023 6.3 (L) 6.4 - 8.3 g/dL Final   12/30/2022 5.8 (L) 6.4 - 8.3 g/dL Final     Albumin   Date Value Ref Range Status   01/17/2023 3.6 3.5 - 5.2 g/dL Final   01/16/2023 3.8 3.5 - 5.2 g/dL Final   12/30/2022 3.6 3.5 - 5.2 g/dL Final     Total Bilirubin   Date Value Ref Range Status   01/17/2023 0.5 0.0 - 1.2 mg/dL Final   01/16/2023 0.4 0.0 - 1.2 mg/dL Final   12/30/2022 0.4 0.0 - 1.2 mg/dL Final     Alkaline Phosphatase   Date Value Ref Range Status   01/17/2023 98 35 - 104 U/L Final   01/16/2023 96 35 - 104 U/L Final   12/30/2022 94 35 - 104 U/L Final     AST   Date Value Ref Range Status   01/17/2023 9 0 - 31 U/L Final   01/16/2023 10 0 - 31 U/L Final   12/30/2022 11 0 - 31 U/L Final     ALT   Date Value Ref Range Status   01/17/2023 <5 0 - 32 U/L Final   01/16/2023 <5 0 - 32 U/L Final   12/30/2022 <5 0 - 32 U/L Final     Est, Glom Filt Rate   Date Value Ref Range Status   01/26/2023 23 >=60 mL/min/1.73 Final     Comment:     Pediatric calculator link  [email or web address] [email or web address]/gfr_calculatorped  Effective Oct 3, 2022  These results are not intended for use in patients  <25years of age. eGFR results are calculated without  a race factor using the 2021 CKD-EPI equation. Careful  clinical correlation is recommended, particularly when  comparing to results calculated using previous equations. The CKD-EPI equation is less accurate in patients with  extremes of muscle mass, extra-renal metabolism of  creatinine, excessive creatinine ingestion, or following  therapy that affects renal tubular secretion.      01/25/2023 23 >=60 mL/min/1.73 Final     Comment:     Pediatric calculator alia [employer].at. [email or web address]/[email or web address]_calculatorped  Effective Oct 3, 2022  These results are not intended for use in patients  <25years of age. eGFR results are calculated without  a race factor using the 2021 CKD-EPI equation. Careful  clinical correlation is recommended, particularly when  comparing to results calculated using previous equations. The CKD-EPI equation is less accurate in patients with  extremes of muscle mass, extra-renal metabolism of  creatinine, excessive creatinine ingestion, or following  therapy that affects renal tubular secretion. 01/24/2023 23 >=60 mL/min/1.73 Final     Comment:     Pediatric calculator link  [employer]. [email or web address]_calculatorped  Effective Oct 3, 2022  These results are not intended for use in patients  <25years of age. eGFR results are calculated without  a race factor using the 2021 CKD-EPI equation. Careful  clinical correlation is recommended, particularly when  comparing to results calculated using previous equations. The CKD-EPI equation is less accurate in patients with  extremes of muscle mass, extra-renal metabolism of  creatinine, excessive creatinine ingestion, or following  therapy that affects renal tubular secretion. GFR    Date Value Ref Range Status   10/16/2022 >60  Final   10/14/2022 59  Final   10/13/2022 >60  Final     Magnesium   Date Value Ref Range Status   12/30/2022 2.0 1.6 - 2.6 mg/dL Final   10/14/2022 2.1 1.6 - 2.6 mg/dL Final   10/08/2022 1.9 1.6 - 2.6 mg/dL Final     Phosphorus   Date Value Ref Range Status   01/20/2023 4.7 (H) 2.5 - 4.5 mg/dL Final   01/19/2023 4.9 (H) 2.5 - 4.5 mg/dL Final   01/18/2023 5.6 (H) 2.5 - 4.5 mg/dL Final     No results for input(s): PH, PO2, PCO2, HCO3, BE, O2SAT in the last 72 hours. RADIOLOGY:  CT ABDOMEN PELVIS WO CONTRAST Additional Contrast? None   Final Result   No nephroureterolithiasis or hydronephrosis. Splenomegaly. Small volume of perihepatic ascites. At least moderate-sized partially imaged right pleural effusion with   bibasilar atelectasis. Several locules of gas which appear to be within the endometrium at the level   of the uterine fundus, of uncertain etiology or clinical significance. Please correlate clinically. Diffuse anasarca throughout the soft tissues. Cardiomegaly. XR CHEST PORTABLE   Final Result   Stable right pleural effusion. PROBLEM LIST:  Principal Problem:    Acute on chronic respiratory failure with hypoxia and hypercapnia (HCC)  Resolved Problems:    * No resolved hospital problems.  *      IMPRESSION:  Cor pulmonale, severe  Obstructive sleep apnea, not optimally treated  Obesity hypoventilation  Severe pulmonary hypertension  Diabetes mellitus type 2  Morbid obesity  COPD  SHANTANU superimposed on CKD    PLAN:  Continuous BiPAP is much as possible  Diuretics per nephrology including acetazolamide  OT and PT are important  Insulin for diabetes mellitus  Diabetic nephropathy to be followed by nephrology service      Electronically signed by Silviano Norwood MD on 1/26/2023 at 3:44 PM

## 2023-01-26 NOTE — PROGRESS NOTES
NAME: Santi Covington  MR:  [id number]  :   1950  Admit Date:  2023      This is a face to face encounter with [facility] of this note, including Diagnosis,  Interval History, Past Medical/Surgical/Family/Social Histories, ROS, physical exam, and Assessment and Plan were copied and pasted from Previous. Updates have been made where noted and reflect current exam and medical decision making from the DOS of this encounter. CHIEF COMPLAINT     ID following for   Chief Complaint   Patient presents with    Respiratory Distress     Normally on 3L NC, came in on CPAP, given total of 50mcg push dose epi for low BP by EMS     HISTORY OF PRESENT ILLNESS     Santi Covington is a 67 y.o. female who presents with   Chief Complaint   Patient presents with    Respiratory Distress     Normally on 3L NC, came in on CPAP, given total of 50mcg push dose epi for low BP by EMS     2023 and has  has a past medical history of A-fib ([address] Acute on chronic congestive heart failure ([address] Anxiety, Asthma, CAD (coronary artery disease), Cancer ([address] Chronic kidney disease, COPD exacerbation ([address] Depression, Diabetes mellitus ([address] H/O mammogram, Hx MRSA infection, Hyperlipidemia, Hypertension, Lateral epicondylitis, Morbid obesity ([address] SERENA on CPAP, Oxygen dependent, Parkinson's disease ([address] and Tubal ligation status. Pt seen and examined 23  In bed afebrilem   Patient is in bed- no distress  On o2 eating  Has genao   C/o weakness     Patient is tolerating medications. No reported adverse drug reactions.   Available labs, imaging studies, microbiologic studies have been reviewed      Assessment & Plan   Pt was admitted with   NSTEMI (non-ST elevated myocardial infarction) ([address] [I21.4]  Atrial fibrillation with rapid ventricular response (HCC) [I48.91]  Recurrent right pleural effusion [J90]  Hypotension, unspecified hypotension type [I95.9]  Acute on chronic respiratory failure with hypoxia and hypercapnia (HCC) [J96.21, J96.22]    ID following for   Hypothermia better   Vre uti day   left le cons colonized not infected stable   Right pleural effusion with bibasilar atelectasis. Plan:   Continue Linezolid- Day # 7 of 8 linezolid   Encourage IS  On zoloft- will watch for interaction   Monitor labs   Pulmonary following   Continue wound care      Can d/c    BP (!) 157/97   Pulse 100   Temp 97.6 °F (36.4 °C) (Oral)   Resp 22   Ht 5' (1.524 m)   Wt 282 lb 12.8 oz (128.3 kg)   SpO2 94%   BMI 55.23 kg/m²   Temp  Av.1 °F (36.7 °C)  Min: 97.6 °F (36.4 °C)  Max: 98.4 °F (36.9 °C)  CONSTITUTIONAL:  no apparent distress eating  ENT:  Normocephalic, atraumatic,     LUNGS:  No increased work of breathing,  dec  to auscultation bilaterally, on o2   CARDIOVASCULAR:    regular rate and rhythm, normal S1 and S2,     ABDOMEN:  normal bowel sounds, soft, distended, non-tender  MUSCULOSKELETAL:   swelling  BLE. Ue edema   Diaz   NEUROLOGIC:  Awake, alert, oriented. SKIN:   no rashes + bruises  Lines:      Peripheral Intravenous Line:  Peripheral IV 23 Left Antecubital (Active)   Site Assessment Clean, dry & intact 23   Line Status Flushed;Blood return noted;Capped;Normal saline locked 23   Line Care Connections checked and tightened; Chlorhexidine wipes; Line pulled back;Ports disinfected;Cap changed 23   Phlebitis Assessment No symptoms 23   Infiltration Assessment 0 23   Alcohol Cap Used No 23   Dressing Status Clean, dry & intact 23   Dressing Type Transparent 23   Dressing Intervention Other (Comment) 23       Peripheral IV 23 Left;Ventral Forearm (Active)   Site Assessment Clean, dry & intact 23   Line Status Flushed;Brisk blood return; Infusing;Capped 23   Line Care Connections checked and tightened; Chlorhexidine wipes; Line pulled back;Ports disinfected;Cap changed 01/20/23 2000   Phlebitis Assessment No symptoms 01/20/23 2000   Infiltration Assessment 0 01/20/23 2000   Alcohol Cap Used Yes 01/20/23 2000   Dressing Status Clean, dry & intact 01/20/23 2000   Dressing Type Transparent 01/20/23 2000   Dressing Intervention Other (Comment) 01/20/23 2000     Drain(s):  Urinary Catheter 01/18/23 Diaz-Temperature (Active)   $ Urethral catheter insertion $ Not inserted for procedure 01/18/23 0319   Catheter Indications Need for fluid volume management of the critically ill patient in a critical care setting 01/21/23 0124   Site Assessment Pink;Moist;No urethral drainage 01/21/23 0124   Urine Color Yellow 01/21/23 0124   Urine Appearance Cloudy 01/21/23 0124   Collection Container Standard 01/21/23 0124   Securement Method Securing device (Describe) 01/21/23 0124   Catheter Care Completed Yes 01/20/23 2000   Catheter Best Practices  Drainage tube clipped to bed;Catheter secured to thigh; Tamper seal intact; Bag below bladder;Bag not on floor; Lack of dependent loop in tubing;Drainage bag less than half full 01/21/23 0124   Status Draining;Patent 01/21/23 0124   Output (mL) 250 mL 01/20/23 1400     Microbiology:   No results for input(s): COVID19 in the last 72 hours.   Lab Results   Component Value Date/Time    BC 5 Days no growth 01/17/2023 05:23 PM    BC 5 Days no growth 10/12/2022 04:59 PM    BC 5 Days no growth 10/06/2022 09:47 AM    BLOODCULT2 5 Days no growth 01/17/2023 05:23 PM    BLOODCULT2 5 Days no growth 10/12/2022 04:59 PM    BLOODCULT2 5 Days no growth 10/05/2022 11:19 AM    ORG Enterococcus faecium 01/18/2023 03:26 AM    ORG Staphylococcus epidermidis 01/17/2023 10:10 PM    ORG Escherichia coli 10/05/2022 11:19 AM     CULTURE, RESPIRATORY   Date Value Ref Range Status   07/06/2022 Oral Pharyngeal Shavon absent (A)  Final   07/06/2022 Light growth  Final     WOUND/ABSCESS   Date Value Ref Range Status   01/17/2023 Light growth  Final   07/05/2022 Heavy growth  Final   07/05/2022 Heavy growth  Final     Smear, Respiratory   Date Value Ref Range Status   07/06/2022 Refer to ordered Gram stain for results  Final         Component Value Date/Time    AFBCX No growth after 6 weeks of incubation. 10/18/2022 1106    AFBCX No growth after 6 weeks of incubation. 07/08/2022 1546    FUNGSM No Fungal elements seen 07/08/2022 1546    LABFUNG No growth after 4 weeks of incubation. 07/08/2022 1546     MRSA Culture Only   Date Value Ref Range Status   01/17/2023 Methicillin resistant Staph aureus not isolated  Final     LABS:  Recent Labs     01/26/23  0749   WBC 10.6   RBC 3.67   HGB 9.2*   HCT 34.6   MCV 94.3   MCH 25.1*   MCHC 26.6*   RDW 15.9*      MPV 11.4       Recent Labs     01/24/23  0825 01/25/23  0755 01/26/23  0749    146 146   K 4.0 4.0 3.6    103 99   CO2 32* 35* 38*   BUN 55* 56* 55*   CREATININE 2.2* 2.2* 2.2*   GLUCOSE 114* 127* 101*   CALCIUM 8.8 8.9 9.2       Lab Results   Component Value Date    SEDRATE 6 07/06/2022    SEDRATE 15 10/24/2021    SEDRATE 115 (H) 02/03/2021     Lab Results   Component Value Date    CRP 5.0 (H) 07/06/2022    CRP 0.7 (H) 10/25/2021    CRP 13.5 (H) 02/03/2021     Lab Results   Component Value Date    PROCAL 0.33 (H) 01/18/2023    PROCAL 0.24 (H) 07/13/2022    PROCAL 0.22 (H) 07/06/2022     REVIEW OF SYSTEMS     As stated above in the chief complaint, otherwise negative.   CURRENT MEDICATIONS     Current Facility-Administered Medications:     furosemide (LASIX) injection 60 mg, 60 mg, IntraVENous, BID, Pamela Mena MD, 60 mg at 01/26/23 0847    linezolid (ZYVOX) tablet 600 mg, 600 mg, Oral, 2 times per day, Amilcar Coronado MD, 600 mg at 01/26/23 0850    [Held by provider] dilTIAZem (CARDIZEM) tablet 60 mg, 60 mg, Oral, 3 times per day, Amilcar Coronado MD, 60 mg at 01/20/23 0616    sucralfate (CARAFATE) tablet 1 g, 1 g, Oral, 4x Daily, Chioma Hawthorne MD, 1 g at 01/26/23 0849    rOPINIRole (REQUIP) tablet 1 mg, 1 mg, Oral, TID, Roro Carbajal MD, 1 mg at 01/26/23 0849    atorvastatin (LIPITOR) tablet 20 mg, 20 mg, Oral, Nightly, Roro Carbajal MD, 20 mg at 01/25/23 2129    ipratropium-albuterol (DUONEB) nebulizer solution 3 mL, 1 vial, Inhalation, 4x Daily, Roro Carbajal MD, 3 mL at 01/26/23 1024    insulin glargine (LANTUS) injection vial 13 Units, 13 Units, SubCUTAneous, BID, Roro Carbajal MD, 13 Units at 01/26/23 0848    loperamide (IMODIUM) capsule 2 mg, 2 mg, Oral, 4x Daily PRN, Roro Carbajal MD    [Held by provider] LORazepam (ATIVAN) tablet 0.5 mg, 0.5 mg, Oral, Nightly, Roro Carbajal MD, 0.5 mg at 01/20/23 2317    metoprolol succinate (TOPROL XL) extended release tablet 25 mg, 25 mg, Oral, BID, Roro Carbajal MD, 25 mg at 01/26/23 0849    insulin lispro (HUMALOG) injection vial 0-4 Units, 0-4 Units, SubCUTAneous, TID WC, Roro Carbajal MD, 1 Units at 01/25/23 1744    insulin lispro (HUMALOG) injection vial 0-4 Units, 0-4 Units, SubCUTAneous, Nightly, Roro Carbajal MD, 4 Units at 01/18/23 2158    budesonide (PULMICORT) nebulizer suspension 500 mcg, 0.5 mg, Nebulization, BID, Roro Carbajal MD, 500 mcg at 01/26/23 0651    sodium chloride flush 0.9 % injection 10 mL, 10 mL, IntraVENous, 2 times per day, Roro Carbajal MD, 10 mL at 01/26/23 0859    sodium chloride flush 0.9 % injection 10 mL, 10 mL, IntraVENous, PRN, Roro Carbajal MD    0.9 % sodium chloride infusion, , IntraVENous, PRN, Roro Carbajal MD    promethazine (PHENERGAN) tablet 12.5 mg, 12.5 mg, Oral, Q6H PRN **OR** ondansetron (ZOFRAN) injection 4 mg, 4 mg, IntraVENous, Q6H PRN, Roro Carbajal MD    polyethylene glycol (GLYCOLAX) packet 17 g, 17 g, Oral, Daily PRN, Roro Carbajal MD    acetaminophen (TYLENOL) tablet 650 mg, 650 mg, Oral, Q6H PRN, 650 mg at 01/24/23 1254 **OR** acetaminophen (TYLENOL) suppository 650 mg, 650 mg, Rectal, Q6H PRN, Roro Carbajal MD    miconazole (MICOTIN) 2 % powder, , Topical, BID, Elfego Boo, APRN - CNP, Given at 01/25/23 2132    arformoterol tartrate (BROVANA) nebulizer solution 15 mcg, 15 mcg, Nebulization, BID, RASHARD Pinto - CNP, 15 mcg at 01/26/23 0651    pantoprazole (PROTONIX) tablet 40 mg, 40 mg, Oral, QAM AC, Aaron Lowry, APRN - CNP, 40 mg at 01/26/23 0546    glucose chewable tablet 16 g, 4 tablet, Oral, PRN, RASHARD Pinto - CNP    dextrose bolus 10% 125 mL, 125 mL, IntraVENous, PRN **OR** dextrose bolus 10% 250 mL, 250 mL, IntraVENous, PRN, RASHARD Pinto - CNP    glucagon (rDNA) injection 1 mg, 1 mg, SubCUTAneous, PRN, RASHARD Pinto CNP    dextrose 10 % infusion, , IntraVENous, Continuous PRN, RASHARD Pinto CNP    apixaban (ELIQUIS) tablet 5 mg, 5 mg, Oral, BID, RASHARD Pinto - CNP, 5 mg at 01/26/23 0850    mirtazapine (REMERON) tablet 7.5 mg, 7.5 mg, Oral, Nightly, RASHARD Pinto - CNP, 7.5 mg at 01/25/23 2129    aspirin EC tablet 81 mg, 81 mg, Oral, Daily, RASHARD Marshall - CNP, 81 mg at 01/26/23 0849    carbidopa-levodopa (SINEMET CR)  MG per extended release tablet 1 tablet, 1 tablet, Oral, TID, RASHARD Pinto - CNP, 1 tablet at 01/26/23 0850    sertraline (ZOLOFT) tablet 50 mg, 50 mg, Oral, Daily, RASHARD Marshall - CNP, 50 mg at 01/26/23 0849    montelukast (SINGULAIR) tablet 10 mg, 10 mg, Oral, Nightly, RASHARD Pinto - CNP, 10 mg at 01/25/23 2130    [Held by provider] midodrine (PROAMATINE) tablet 5 mg, 5 mg, Oral, TID WC, RASHARD Pinto CNP  DIAGNOSTIC RESULTS   Radiology:  CT ABDOMEN PELVIS WO CONTRAST Additional Contrast? None    Result Date: 1/18/2023  EXAMINATION: CT OF THE ABDOMEN AND PELVIS WITHOUT CONTRAST 1/18/2023 5:47 am TECHNIQUE: CT of the abdomen and pelvis was performed without the administration of intravenous contrast. Multiplanar reformatted images are provided for review.  Automated exposure control, iterative reconstruction, and/or weight based adjustment of the mA/kV was utilized to reduce the radiation dose to as low as reasonably achievable. COMPARISON: October 8, 2022 HISTORY: ORDERING SYSTEM PROVIDED HISTORY: R/O obstructive uropathy TECHNOLOGIST PROVIDED HISTORY: Reason for exam:->R/O obstructive uropathy Additional Contrast?->None FINDINGS: Lower Chest: The heart is enlarged. There is a small pericardial effusion and at least a moderate size right pleural effusion is partially imaged. A trace left pleural effusion is noted. Left lower lobe subsegmental atelectasis is noted. Organs: The gallbladder is surgically absent. The liver has an unremarkable unenhanced appearance. The spleen is enlarged measuring 15.9 cm in craniocaudal dimension. The unenhanced pancreas demonstrates diffuse fatty atrophy. The kidneys are without hydronephrosis or radiopaque calculus. GI/Bowel: No evidence of a bowel obstruction, free air or pneumatosis. Portions of the colon are decompressed, limiting assessment for mucosal based abnormalities. The appendix is not confidently visualized. No obvious pericecal inflammatory changes to suggest acute appendicitis. Pelvis: The urinary bladder is decompressed around a Diaz catheter. A small amount of the intraluminal gas in the urinary bladder may be related to recent instrumentation. A small amount of gas is present in the uterine fundus, apparently within the endometrial canal.  The ovaries are not confidently visualized. There are scattered visible but nonenlarged pelvic lymph nodes. Peritoneum/Retroperitoneum: A small amount of ascites is present, predominantly in a perihepatic distribution. The abdominal aorta is mildly calcified without evidence of aneurysm. No retroperitoneal lymphadenopathy or mass. Bones/Soft Tissues: No acute osseous abnormality or evidence of an aggressive osseous lesion. Anasarca is present throughout the soft tissues. No nephroureterolithiasis or hydronephrosis. Splenomegaly. Small volume of perihepatic ascites.  At least moderate-sized partially imaged right pleural effusion with bibasilar atelectasis. Several locules of gas which appear to be within the endometrium at the level of the uterine fundus, of uncertain etiology or clinical significance. Please correlate clinically. Diffuse anasarca throughout the soft tissues. Cardiomegaly. XR FOOT RIGHT (MIN 3 VIEWS)    Result Date: 1/16/2023  EXAMINATION: THREE XRAY VIEWS OF THE RIGHT FOOT 1/16/2023 12:40 pm COMPARISON: None. HISTORY: ORDERING SYSTEM PROVIDED HISTORY: Evaluate big toe wound TECHNOLOGIST PROVIDED HISTORY: Reason for exam:->Evaluate big toe wound FINDINGS: Soft tissue wound evident at the great toe. No radiographically visible acute osteomyelitis. 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 No radiopaque foreign body or soft tissue emphysema. Plantar heel spur noted. No evident acute osteomyelitis. Great toe soft tissue ulcer. Soft tissue swelling. See above. XR CHEST PORTABLE    Result Date: 1/17/2023  EXAMINATION: ONE XRAY VIEW OF THE CHEST 1/17/2023 5:38 pm COMPARISON: 01/16/2023 HISTORY: ORDERING SYSTEM PROVIDED HISTORY: sob TECHNOLOGIST PROVIDED HISTORY: Reason for exam:->sob FINDINGS: The lungs are without acute focal process. Stable right pleural effusion. No pneumothorax. Stable heart size. The osseous structures are without acute process. Stable right pleural effusion. XR CHEST PORTABLE    Result Date: 1/16/2023  EXAMINATION: ONE XRAY VIEW OF THE CHEST 1/16/2023 8:53 am COMPARISON: 30 December 2022 HISTORY: ORDERING SYSTEM PROVIDED HISTORY: SOB TECHNOLOGIST PROVIDED HISTORY: Reason for exam:->SOB FINDINGS: Stable right pleural effusion with adjacent atelectasis and or infiltrate. Stable cardiomediastinal silhouette. No new abnormal findings. Stable right-sided pleural effusion with adjacent atelectasis and or infiltrate.      XR CHEST 1 VIEW    Result Date: 12/30/2022  EXAMINATION: ONE XRAY VIEW OF THE CHEST 12/30/2022 3:21 pm COMPARISON: 12/19/2022 HISTORY: ORDERING SYSTEM PROVIDED HISTORY: SOB TECHNOLOGIST PROVIDED HISTORY: Reason for exam:->SOB FINDINGS: The heart is enlarged. There is a right pleural effusion right lung base atelectasis. I cannot exclude partial collapse of the right lower lobe. The left lung is clear. There is no pneumothorax. 1. Right pleural effusion right lung base atelectasis. 2. I cannot exclude partial collapse of the right lower lobe 3. Cardiomegaly      Imaging and labs were reviewed per medical records. Thank you for involving me in the care of Amina Mancini I will continue to follow. Please do not hesitate to call [phone] for any questions or concerns.     Electronically signed by La Varela MD on 1/26/2023 at 12:30 PM

## 2023-01-26 NOTE — PROGRESS NOTES
Comprehensive Nutrition Assessment    Type and Reason for Visit:  Reassess    Nutrition Recommendations/Plan:   Continue current diet regimen & monitor. Malnutrition Assessment:  Malnutrition Status: At risk for malnutrition (Comment) (01/20/23 1432)    Context:  Chronic Illness     Findings of the 6 clinical characteristics of malnutrition:  Energy Intake:  No significant decrease in energy intake  Weight Loss:  Unable to assess     Body Fat Loss:  No significant body fat loss     Muscle Mass Loss:  No significant muscle mass loss    Fluid Accumulation:  Unable to assess     Strength:  Not Performed    Nutrition Assessment:    Pt admit w/ SHANTANU on CKD, Acute on chronic respiratory failure d/t COPD exacerbation and PNA, edema/anasarca chronic, pulmonary HTN. On fluid restriction, IV fluid resuscitation. PMHx of CHF, COPD, CKD, CAD, breast cancer, DM, SERENA, Parkinson's ds. Note pt still  in need of the bipap. Pt is on HTL w/ meal  intake ~< 50%. Continue current diet regimen & monitor. Nutrition Related Findings:    A/O x4, obese/soft/distended/bloated/nontender abd, +BS, flatus, I/O -2.6L, +2/+3 pitting edema, elevated renal labs Wound Type: Wound Consult Pending (R upper thigh redness, L pretibial popped blister)       Current Nutrition Intake & Therapies:    Average Meal Intake: 1-25%, 26-50%, %, 0%  Average Supplements Intake: Unable to assess  ADULT DIET; Regular; 3 carb choices (45 gm/meal); Low Fat/Low Chol/High Fiber/2 gm Na; Moderately Thick (Honey); 1800 ml  ADULT ORAL NUTRITION SUPPLEMENT; Dinner; Other Oral Supplement; Gelatein 20    Height: 5' (152.4 cm)  Ideal Body Weight (IBW): 100 lbs (45 kg)    Admission Body Weight: 262 lb 2 oz (118.9 kg) (1/17 bed scale)  Current Body Weight: 282 lb (127.9 kg) (1/26 bed), 273.3 % IBW.  Weight Source: Bed Scale  Current BMI (kg/m2): 55.1  Usual Body Weight:  (JOHN d/t wt fluctuations in EMR)     Weight Adjustment For: No Adjustment     BMI Categories: Obese Class 3 (BMI 40.0 or greater)    Estimated Daily Nutrient Needs:  Energy Requirements Based On: Formula  Weight Used for Energy Requirements: Current  Energy (kcal/day): 1900-2000kcal/day  Weight Used for Protein Requirements: Ideal  Protein (g/day): 115-120g/day (2/5-2.7g/kg IBW)  Method Used for Fluid Requirements: 1 ml/kcal  Fluid (ml/day): per critical care (1800ml fluid restiction)    Nutrition Diagnosis:   Inadequate oral intake related to impaired respiratory function, cardiac dysfunction, renal dysfunction (multiple comorbidities/chronic illnesses) as evidenced by intake 51-75%, poor intake prior to admission    Nutrition Interventions:   Food and/or Nutrient Delivery: Continue Current Diet, Continue Oral Nutrition Supplement  Nutrition Education/Counseling: Education completed  Coordination of Nutrition Care: Continue to monitor while inpatient     Goals:     Goals: PO intake 75% or greater     Nutrition Monitoring and Evaluation:   Behavioral-Environmental Outcomes: None Identified  Food/Nutrient Intake Outcomes: Food and Nutrient Intake, Supplement Intake  Physical Signs/Symptoms Outcomes: Biochemical Data, Chewing or Swallowing, GI Status, Nausea or Vomiting, Weight, Skin, Nutrition Focused Physical Findings, Fluid Status or Edema    Discharge Planning:     Too soon to determine     Mervin Martinez RD  Contact: [phone]

## 2023-01-26 NOTE — PROGRESS NOTES
Physical Therapy  Physical Therapy Treatment Note/Plan of Care    Room #:  8858/1544-68  Patient Name: Rodriguez Patel  YOB: 1950  MRN: [id number]    Date of Service: 1/26/2023     Tentative placement recommendation: Subacute Rehab  Equipment recommendation:  To be determined      Evaluating Physical Therapist: Ann Chester PT, DPT #[id number]      Specific Provider Orders/Date/Referring Provider :     01/19/23 0745    PT eval and treat  Start:  01/19/23 0745,   End:  01/19/23 0745,   ONE TIME,   Standing Count:  1 Occurrences,   Cathi Gilford, MD Acknowledge New     Admitting Diagnosis:   NSTEMI (non-ST elevated myocardial infarction) [facility]) [I21.4]  Atrial fibrillation with rapid ventricular response (HCC) [I48.91]  Recurrent right pleural effusion [J90]  Hypotension, unspecified hypotension type [I95.9]  Acute on chronic respiratory failure with hypoxia and hypercapnia (HCC) [J96.21, J96.22]      Surgery: none  Visit Diagnoses         Codes    Recurrent right pleural effusion     J90    NSTEMI (non-ST elevated myocardial infarction) ([facility] [address]     I21.4            Patient Active Problem List   Diagnosis    Depression with anxiety    Osteoporosis    Asthma    Hyperlipidemia    Mitral regurgitation    Obstructive sleep apnea syndrome    Psoriasis    Diabetes mellitus ([address]    Parkinson's disease ([address]    Primary hypertension    Microalbuminuria    Morbid obesity (HCC)    RLS (restless legs syndrome)    Generalized weakness    Inability to walk    Hypothyroidism    Chest pain    Acute asthma exacerbation    Asthma exacerbation, mild    Paroxysmal atrial fibrillation (HCC)    Atrial fibrillation with rapid ventricular response (HCC)    Acute on chronic congestive heart failure ([address]    Coronary artery disease involving native coronary artery of native heart without angina pectoris    Dysphagia    Hepatosplenomegaly    Acute decompensated heart failure (HCC)    Acute diastolic (congestive) heart failure (HCC)    Nonrheumatic tricuspid valve regurgitation    Tobacco abuse    Recurrent syncope    Septic shock (HCC)    Seizure-like activity (HCC)    Acute kidney injury ([facility] [address]    Pancytopenia (HCC)    Thrombocytopenia (HCC)    Encephalopathy    Acute respiratory failure with hypoxia (HCC)    Lactic acidosis    Delirium    Acute on chronic anemia    Pleural effusion, right    Acute respiratory failure with hypoxia and hypercapnia (HCC)    Acute confusion    Acute on chronic diastolic heart failure (HCC)    Macrocytosis    Other specified anemias    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[facility] [address]    Puerperal sepsis with acute hypoxic respiratory failure without septic shock (HCC)    Pulmonary HTN (HCC)    Chronic anticoagulation    RVF (right ventricular failure) (HCC)    Metabolic alkalosis    Sepsis (HCC)    COPD exacerbation (HCC)    Acute on chronic respiratory failure with hypercapnia (HCC)    Persistent atrial fibrillation (HCC)    Hypercapnic respiratory failure (HCC)    Acute on chronic respiratory failure (HCC)    Hyperkalemia    Heart failure (HCC)    Acute renal insufficiency    Hypotension    Anemia    Acute on chronic respiratory failure with hypoxia and hypercapnia (HCC)        ASSESSMENT of Current Deficits Patient exhibits decreased strength, balance, and endurance impairing functional mobility, transfers, gait , gait distance, and tolerance to activity. Pt with AROM with BUE's and AAROM with BLE's. Pt did not want to get out of bed due to being to tired and shortness of breath.       PHYSICAL THERAPY  PLAN OF CARE       Physical therapy plan of care is established based on physician order,  patient diagnosis and clinical assessment    Current Treatment Recommendations:    -Bed Mobility: Lower extremity exercises  and Trunk control activities   -Sitting Balance: Incorporate reaching activities to activate trunk muscles , Hands on support to maintain midline , Facilitate active trunk muscle engagement , Facilitate postural control in all planes , and Engage in core activities to allow for movement within base of support   -Standing Balance: Perform strengthening exercises in standing to promote motor control with or without upper extremity support , Instruct patient on adequate base of support to maintain balance, and Challenge balance utilizing reaching  activities beyond center of gravity    -Transfers: Provide instruction on proper hand and foot position for adequate transfer of weight onto lower extremities and use of gait device if needed, Cues for hand placement, technique and safety. Provide stabilization to prevent fall , Facilitate weight shift forward on to lower extremities and provide necessary stabilization of bilateral lower extremities , Support transfer of weight on to lower extremities, and Assist with extension of knees trunk and hip to accept weight transfer   -Gait: Gait training, Standing activities to improve: base of support, weight shift, weight bearing , Exercises to improve trunk control, Exercises to improve hip and knee control, Performance of protected weight bearing activities, and Activities to increase weight bearing   -Endurance: Utilize Supervised activities to increase level of endurance to allow for safe functional mobility including transfers and gait  and Use graduated activities to promote good breathing techniques and provide support and education to maximize respiratory function    PT long term treatment goals are located in below grid    Patient and or family understand(s) diagnosis, prognosis, and plan of care. Frequency of treatments: Patient will be seen  3-5 times/week.          Prior Level of Function: Patient ambulated with wheeled walker for short distances  Rehab Potential: fair + for baseline    Past medical history:   Past Medical History:   Diagnosis Date    A-fib ([facility] [address]     Acute on chronic congestive heart failure (HCC)     Anxiety     Asthma     CAD (coronary artery disease) 01/21/2016    Cancer ([facility] [address]  breast ca 2006    right lumpectomy    Chronic kidney disease     nephrolithiasis    COPD exacerbation ([facility] [address] 10/12/2022    Depression     Diabetes mellitus ([facility] [address]     H/O mammogram     Hx MRSA infection     toe infection january 2012    Hyperlipidemia     Hypertension     Lateral epicondylitis     Morbid obesity (HCC)     SERENA on CPAP     Oxygen dependent     Parkinson's disease ([facility] [address]     Tubal ligation status      Past Surgical History:   Procedure Laterality Date    BREAST LUMPECTOMY      BREAST REDUCTION SURGERY      CARDIAC CATHETERIZATION  4/28/2014    Dr. Marsha Gongora  01/11/2022    Dr. April Parker  7/29/15    CT GUIDED CHEST TUBE  7/8/2022    CT GUIDED CHEST TUBE 7/8/2022 Julio Ford MD [facility]    ENDOSCOPY, COLON, DIAGNOSTIC  7/19/15    GALLBLADDER SURGERY      LUMBAR LAMINECTOMY      TOE AMPUTATION      TONSILLECTOMY      UPPER GASTROINTESTINAL ENDOSCOPY      UPPER GASTROINTESTINAL ENDOSCOPY N/A 7/19/2022    EGD ESOPHAGOGASTRODUODENOSCOPY performed by Humaira Garibay MD at [address]:    Precautions:  Up with assistance, falls, O2, and morbid obesity      Social history: Patient from SNF    Equipment owned: Wheelchair, Wheeled Walker, and [address] [place] [address] [address] Mobility Inpatient   How much difficulty turning over in bed?: A Lot  How much difficulty sitting down on / standing up from a chair with arms?: Unable  How much difficulty moving from lying on back to sitting on side of bed?: A Lot  How much help from another person moving to and from a bed to a chair?: Total  How much help from another person needed to walk in hospital room?: Total  How much help from another person for climbing 3-5 steps with a railing?: Total  AM-PAC Inpatient Mobility Raw Score : 8  AM-PAC Inpatient T-Scale Score : 28.52  Mobility Inpatient CMS 0-100% Score: 86.62  Mobility Inpatient CMS G-Code Modifier : CM    Nursing cleared patient for PT treatment. OBJECTIVE;   Initial Evaluation  Date: 1/19/2023 Treatment Date:  1/26/2023       Short Term/ Long Term   Goals   Was pt agreeable to Eval/treatment? Yes yes To be met in 3 days   Pain level   0/10   0/10    Bed Mobility    Rolling: Maximal assist of 1    Supine to sit: Maximal assist of 1    Sit to supine: Maximal assist of 1    Scooting: Maximal assist of 1   Rolling: Not assessed    Supine to sit: Not assessed    Sit to supine: Not assessed    Scooting: Not assessed     Rolling: Minimal assist of 1    Supine to sit: Minimal assist of 1    Sit to supine: Minimal assist of 1    Scooting: Minimal assist of 1     Transfers Sit to stand: Not assessed  d/t fair- seated balance and pt declining standing Sit to stand: Not assessed       Sit to stand:  Moderate assist of 1    Ambulation    not assessed     > 15 feet using  wheeled walker with Moderate assist of 1    Stair negotiation: ascended and descended   Not assessed           ROM Within functional limits    Increase range of motion 10% of affected joints    Strength BUE:  refer to OT eval  RLE:  4-/5  LLE:  4-/5  Increase strength in affected mm groups by 1/3 grade   Balance Sitting EOB:  fair -  Dynamic Standing:  not assessed  Sitting EOB: not assessed   Dynamic Standing: not assessed    Sitting EOB:  fair   Dynamic Standing: fair - with [employer]     Patient is Alert & Oriented x person, place, time, and situation and follows directions    Sensation:  Patient  denies numbness/tingling   Edema:  no   Endurance: fair -    Vitals: Bipap   Blood Pressure at rest  Blood Pressure during session    Heart Rate at rest  Heart Rate during session    SPO2 at rest 96%  SPO2 during session 94-96%     Patient education  Patient educated on role of Physical Therapy, risks of immobility, safety and plan of care, energy conservation,  importance of mobility while in hospital , ankle pumps, quad set and glut set for edema control, blood clot prevention, importance and purpose of adaptive device and adjusted to proper height for the patient. , and safety      Patient response to education:   Pt verbalized understanding Pt demonstrated skill Pt requires further education in this area   Yes Partial Yes      Treatment:  Patient practiced and was instructed/facilitated in the following treatment: Patient    performed supine BLE & BUE exercises. Therapeutic Exercises:  ankle pumps, quad sets, heel slide, hip abduction/adduction, SAQ, shoulder elevation, elbow flexion/extension, wrist flexion/extension, and finger flexion/extension, x 15 reps. At end of session, patient in bed with alarm call light and phone within reach,  all lines and tubes intact, nursing notified. Patient would benefit from continued skilled Physical Therapy to improve functional independence and quality of life. Patient's/ family goals   rehab    Time in 13:58  Time out 14:21    Total Treatment Time  23 minutes      CPT codes:    Therapeutic exercises (20270)   23 minutes  2 unit(s)    Mary Manjarrez  \[facility] [place] [facility]\""  LIC # [id number]

## 2023-01-27 LAB
ANION GAP SERPL CALCULATED.3IONS-SCNC: 9 MMOL/L (ref 7–16)
BUN BLDV-MCNC: 56 MG/DL (ref 6–23)
CALCIUM SERPL-MCNC: 8.8 MG/DL (ref 8.6–10.2)
CHLORIDE BLD-SCNC: 99 MMOL/L (ref 98–107)
CO2: 37 MMOL/L (ref 22–29)
CREAT SERPL-MCNC: 2.3 MG/DL (ref 0.5–1)
GFR SERPL CREATININE-BSD FRML MDRD: 22 ML/MIN/1.73
GLUCOSE BLD-MCNC: 58 MG/DL (ref 74–99)
METER GLUCOSE: 103 MG/DL (ref 74–99)
METER GLUCOSE: 109 MG/DL (ref 74–99)
METER GLUCOSE: 63 MG/DL (ref 74–99)
METER GLUCOSE: 96 MG/DL (ref 74–99)
POTASSIUM SERPL-SCNC: 3.4 MMOL/L (ref 3.5–5)
SODIUM BLD-SCNC: 145 MMOL/L (ref 132–146)

## 2023-01-27 PROCEDURE — 51702 INSERT TEMP BLADDER CATH: CPT

## 2023-01-27 PROCEDURE — 6370000000 HC RX 637 (ALT 250 FOR IP): Performed by: INTERNAL MEDICINE

## 2023-01-27 PROCEDURE — 82962 GLUCOSE BLOOD TEST: CPT

## 2023-01-27 PROCEDURE — 2700000000 HC OXYGEN THERAPY PER DAY

## 2023-01-27 PROCEDURE — 2500000003 HC RX 250 WO HCPCS: Performed by: INTERNAL MEDICINE

## 2023-01-27 PROCEDURE — 2580000003 HC RX 258: Performed by: INTERNAL MEDICINE

## 2023-01-27 PROCEDURE — 99232 SBSQ HOSP IP/OBS MODERATE 35: CPT | Performed by: INTERNAL MEDICINE

## 2023-01-27 PROCEDURE — 94660 CPAP INITIATION&MGMT: CPT

## 2023-01-27 PROCEDURE — 6360000002 HC RX W HCPCS: Performed by: INTERNAL MEDICINE

## 2023-01-27 PROCEDURE — 6370000000 HC RX 637 (ALT 250 FOR IP): Performed by: NURSE PRACTITIONER

## 2023-01-27 PROCEDURE — 80048 BASIC METABOLIC PNL TOTAL CA: CPT

## 2023-01-27 PROCEDURE — 97110 THERAPEUTIC EXERCISES: CPT | Performed by: PHYSICAL THERAPIST

## 2023-01-27 PROCEDURE — 2060000000 HC ICU INTERMEDIATE R&B

## 2023-01-27 PROCEDURE — 97530 THERAPEUTIC ACTIVITIES: CPT | Performed by: PHYSICAL THERAPIST

## 2023-01-27 PROCEDURE — 94640 AIRWAY INHALATION TREATMENT: CPT

## 2023-01-27 PROCEDURE — 6360000002 HC RX W HCPCS: Performed by: NURSE PRACTITIONER

## 2023-01-27 PROCEDURE — 36415 COLL VENOUS BLD VENIPUNCTURE: CPT

## 2023-01-27 RX ORDER — POTASSIUM CHLORIDE 20 MEQ/1
40 TABLET, EXTENDED RELEASE ORAL ONCE
Status: COMPLETED | OUTPATIENT
Start: 2023-01-27 | End: 2023-01-27

## 2023-01-27 RX ORDER — DILTIAZEM HYDROCHLORIDE 5 MG/ML
10 INJECTION INTRAVENOUS ONCE
Status: COMPLETED | OUTPATIENT
Start: 2023-01-27 | End: 2023-01-27

## 2023-01-27 RX ADMIN — ROPINIROLE HYDROCHLORIDE 1 MG: 1 TABLET, FILM COATED ORAL at 14:47

## 2023-01-27 RX ADMIN — ANTI-FUNGAL POWDER MICONAZOLE NITRATE TALC FREE: 1.42 POWDER TOPICAL at 09:10

## 2023-01-27 RX ADMIN — SUCRALFATE 1 G: 1 TABLET ORAL at 09:05

## 2023-01-27 RX ADMIN — APIXABAN 5 MG: 5 TABLET, FILM COATED ORAL at 09:05

## 2023-01-27 RX ADMIN — SUCRALFATE 1 G: 1 TABLET ORAL at 22:24

## 2023-01-27 RX ADMIN — METOPROLOL SUCCINATE 25 MG: 25 TABLET, FILM COATED, EXTENDED RELEASE ORAL at 22:23

## 2023-01-27 RX ADMIN — SUCRALFATE 1 G: 1 TABLET ORAL at 18:17

## 2023-01-27 RX ADMIN — MIRTAZAPINE 7.5 MG: 15 TABLET, FILM COATED ORAL at 22:23

## 2023-01-27 RX ADMIN — ACETAZOLAMIDE 500 MG: 500 INJECTION, POWDER, LYOPHILIZED, FOR SOLUTION INTRAVENOUS at 14:52

## 2023-01-27 RX ADMIN — ACETAMINOPHEN 650 MG: 325 TABLET ORAL at 22:23

## 2023-01-27 RX ADMIN — IPRATROPIUM BROMIDE AND ALBUTEROL SULFATE 3 ML: .5; 2.5 SOLUTION RESPIRATORY (INHALATION) at 09:19

## 2023-01-27 RX ADMIN — IPRATROPIUM BROMIDE AND ALBUTEROL SULFATE 3 ML: .5; 2.5 SOLUTION RESPIRATORY (INHALATION) at 18:01

## 2023-01-27 RX ADMIN — BUDESONIDE 500 MCG: 0.5 SUSPENSION RESPIRATORY (INHALATION) at 06:11

## 2023-01-27 RX ADMIN — ATORVASTATIN CALCIUM 20 MG: 20 TABLET, FILM COATED ORAL at 22:23

## 2023-01-27 RX ADMIN — SERTRALINE 50 MG: 50 TABLET, FILM COATED ORAL at 09:05

## 2023-01-27 RX ADMIN — ROPINIROLE HYDROCHLORIDE 1 MG: 1 TABLET, FILM COATED ORAL at 09:06

## 2023-01-27 RX ADMIN — DILTIAZEM HYDROCHLORIDE 10 MG: 5 INJECTION INTRAVENOUS at 22:22

## 2023-01-27 RX ADMIN — Medication 10 ML: at 09:11

## 2023-01-27 RX ADMIN — POTASSIUM CHLORIDE 40 MEQ: 1500 TABLET, EXTENDED RELEASE ORAL at 18:17

## 2023-01-27 RX ADMIN — Medication 10 ML: at 22:24

## 2023-01-27 RX ADMIN — ROPINIROLE HYDROCHLORIDE 1 MG: 1 TABLET, FILM COATED ORAL at 22:24

## 2023-01-27 RX ADMIN — IPRATROPIUM BROMIDE AND ALBUTEROL SULFATE 3 ML: .5; 2.5 SOLUTION RESPIRATORY (INHALATION) at 14:10

## 2023-01-27 RX ADMIN — INSULIN GLARGINE 13 UNITS: 100 INJECTION, SOLUTION SUBCUTANEOUS at 09:13

## 2023-01-27 RX ADMIN — METOPROLOL SUCCINATE 25 MG: 25 TABLET, FILM COATED, EXTENDED RELEASE ORAL at 09:26

## 2023-01-27 RX ADMIN — PANTOPRAZOLE SODIUM 40 MG: 40 TABLET, DELAYED RELEASE ORAL at 06:06

## 2023-01-27 RX ADMIN — APIXABAN 5 MG: 5 TABLET, FILM COATED ORAL at 22:24

## 2023-01-27 RX ADMIN — ASPIRIN 81 MG: 81 TABLET, COATED ORAL at 09:05

## 2023-01-27 RX ADMIN — ARFORMOTEROL TARTRATE 15 MCG: 15 SOLUTION RESPIRATORY (INHALATION) at 06:11

## 2023-01-27 RX ADMIN — IPRATROPIUM BROMIDE AND ALBUTEROL SULFATE 3 ML: .5; 2.5 SOLUTION RESPIRATORY (INHALATION) at 06:11

## 2023-01-27 RX ADMIN — CARBIDOPA AND LEVODOPA 1 TABLET: 50; 200 TABLET, EXTENDED RELEASE ORAL at 09:05

## 2023-01-27 RX ADMIN — CARBIDOPA AND LEVODOPA 1 TABLET: 50; 200 TABLET, EXTENDED RELEASE ORAL at 22:23

## 2023-01-27 RX ADMIN — ACETAZOLAMIDE 500 MG: 500 INJECTION, POWDER, LYOPHILIZED, FOR SOLUTION INTRAVENOUS at 03:07

## 2023-01-27 RX ADMIN — MONTELUKAST SODIUM 10 MG: 10 TABLET, FILM COATED ORAL at 22:26

## 2023-01-27 RX ADMIN — BUDESONIDE 500 MCG: 0.5 SUSPENSION RESPIRATORY (INHALATION) at 18:01

## 2023-01-27 RX ADMIN — SUCRALFATE 1 G: 1 TABLET ORAL at 14:47

## 2023-01-27 RX ADMIN — CARBIDOPA AND LEVODOPA 1 TABLET: 50; 200 TABLET, EXTENDED RELEASE ORAL at 14:47

## 2023-01-27 RX ADMIN — BUMETANIDE 0.75 MG/HR: 0.25 INJECTION INTRAMUSCULAR; INTRAVENOUS at 11:16

## 2023-01-27 RX ADMIN — ARFORMOTEROL TARTRATE 15 MCG: 15 SOLUTION RESPIRATORY (INHALATION) at 18:01

## 2023-01-27 RX ADMIN — ANTI-FUNGAL POWDER MICONAZOLE NITRATE TALC FREE: 1.42 POWDER TOPICAL at 22:26

## 2023-01-27 ASSESSMENT — PAIN SCALES - WONG BAKER: WONGBAKER_NUMERICALRESPONSE: 0

## 2023-01-27 NOTE — PROGRESS NOTES
Department of Internal Medicine  General Internal Medicine  Attending Progress Note  Chief Complaint   Patient presents with    Respiratory Distress     Normally on 3L NC, came in on CPAP, given total of 50mcg push dose epi for low BP by EMS     SUBJECTIVE:    Reports that she is doing okay. Denied fever and chills. No chest pain. Noted improving appetite.     OBJECTIVE      Medications    Current Facility-Administered Medications: bumetanide (BUMEX) 12.5 mg in sodium chloride 0.9 % 125 mL infusion, 0.75 mg/hr, IntraVENous, Continuous  [Held by provider] dilTIAZem (CARDIZEM) tablet 60 mg, 60 mg, Oral, 3 times per day  sucralfate (CARAFATE) tablet 1 g, 1 g, Oral, 4x Daily  rOPINIRole (REQUIP) tablet 1 mg, 1 mg, Oral, TID  atorvastatin (LIPITOR) tablet 20 mg, 20 mg, Oral, Nightly  ipratropium-albuterol (DUONEB) nebulizer solution 3 mL, 1 vial, Inhalation, 4x Daily  [Held by provider] insulin glargine (LANTUS) injection vial 13 Units, 13 Units, SubCUTAneous, BID  loperamide (IMODIUM) capsule 2 mg, 2 mg, Oral, 4x Daily PRN  [Held by provider] LORazepam (ATIVAN) tablet 0.5 mg, 0.5 mg, Oral, Nightly  metoprolol succinate (TOPROL XL) extended release tablet 25 mg, 25 mg, Oral, BID  insulin lispro (HUMALOG) injection vial 0-4 Units, 0-4 Units, SubCUTAneous, TID WC  insulin lispro (HUMALOG) injection vial 0-4 Units, 0-4 Units, SubCUTAneous, Nightly  budesonide (PULMICORT) nebulizer suspension 500 mcg, 0.5 mg, Nebulization, BID  sodium chloride flush 0.9 % injection 10 mL, 10 mL, IntraVENous, 2 times per day  sodium chloride flush 0.9 % injection 10 mL, 10 mL, IntraVENous, PRN  0.9 % sodium chloride infusion, , IntraVENous, PRN  promethazine (PHENERGAN) tablet 12.5 mg, 12.5 mg, Oral, Q6H PRN **OR** ondansetron (ZOFRAN) injection 4 mg, 4 mg, IntraVENous, Q6H PRN  polyethylene glycol (GLYCOLAX) packet 17 g, 17 g, Oral, Daily PRN  acetaminophen (TYLENOL) tablet 650 mg, 650 mg, Oral, Q6H PRN **OR** acetaminophen (TYLENOL) suppository 650 mg, 650 mg, Rectal, Q6H PRN  miconazole (MICOTIN) 2 % powder, , Topical, BID  arformoterol tartrate (BROVANA) nebulizer solution 15 mcg, 15 mcg, Nebulization, BID  pantoprazole (PROTONIX) tablet 40 mg, 40 mg, Oral, QAM AC  glucose chewable tablet 16 g, 4 tablet, Oral, PRN  dextrose bolus 10% 125 mL, 125 mL, IntraVENous, PRN **OR** dextrose bolus 10% 250 mL, 250 mL, IntraVENous, PRN  glucagon (rDNA) injection 1 mg, 1 mg, SubCUTAneous, PRN  dextrose 10 % infusion, , IntraVENous, Continuous PRN  apixaban (ELIQUIS) tablet 5 mg, 5 mg, Oral, BID  mirtazapine (REMERON) tablet 7.5 mg, 7.5 mg, Oral, Nightly  aspirin EC tablet 81 mg, 81 mg, Oral, Daily  carbidopa-levodopa (SINEMET CR)  MG per extended release tablet 1 tablet, 1 tablet, Oral, TID  sertraline (ZOLOFT) tablet 50 mg, 50 mg, Oral, Daily  montelukast (SINGULAIR) tablet 10 mg, 10 mg, Oral, Nightly  [Held by provider] midodrine (PROAMATINE) tablet 5 mg, 5 mg, Oral, TID WC  Physical    VITALS:  BP 98/76   Pulse (!) 111   Temp 97.4 °F (36.3 °C) (Infrared)   Resp 20   Ht 5' (1.524 m)   Wt 277 lb 8 oz (125.9 kg)   SpO2 96%   BMI 54.20 kg/m²   CONSTITUTIONAL:  awake, cooperative, and no apparent distress  EYES:  extra-ocular muscles intact  ENT:  normocepalic, without obvious abnormality  NECK:  supple, symmetrical, trachea midline  LUNGS:  no increased work of breathing, no retractions, and diminished breath sounds throughout lungs  CARDIOVASCULAR:  normal apical pulses and normal S1 and S2  ABDOMEN:  normal bowel sounds, non-distended, and non-tender  MUSCULOSKELETAL:  bilateral lower extremity edema  NEUROLOGIC:  Mental Status Exam:  Level of Alertness:   awake  Orientation:   person, place, time  SKIN:  no bruising or bleeding  Data    CBC:   Lab Results   Component Value Date/Time    WBC 10.6 01/26/2023 07:49 AM    RBC 3.67 01/26/2023 07:49 AM    HGB 9.2 01/26/2023 07:49 AM    HCT 34.6 01/26/2023 07:49 AM    MCV 94.3 01/26/2023 07:49 AM MCH 25.1 01/26/2023 07:49 AM    MCHC 26.6 01/26/2023 07:49 AM    RDW 15.9 01/26/2023 07:49 AM     01/26/2023 07:49 AM    MPV 11.4 01/26/2023 07:49 AM     BMP:    Lab Results   Component Value Date/Time     01/27/2023 08:03 AM    K 3.4 01/27/2023 08:03 AM    K 4.8 01/20/2023 05:07 AM    CL 99 01/27/2023 08:03 AM    CO2 37 01/27/2023 08:03 AM    BUN 56 01/27/2023 08:03 AM    LABALBU 3.6 01/17/2023 05:23 PM    CREATININE 2.3 01/27/2023 08:03 AM    CALCIUM 8.8 01/27/2023 08:03 AM    GFRAA >60 10/16/2022 09:20 AM    LABGLOM 22 01/27/2023 08:03 AM    GLUCOSE 58 01/27/2023 08:03 AM       ASSESSMENT AND PLAN      Acute on chronic respiratory failure with hypoxia and hypercapnia: Pulm following. Bipap. PRN  UTI: Abx per ID. Completed zyvox    DM type 2: now with hypoglycemia. Hold insulin. Encourage oral intake. Continue to monitor  CHF with acute exacerbation systolic dysfunction: continue IV bumex drip  Hypertension Essential: good control  Copd: not in exacerbation.  Continue breathing treatment

## 2023-01-27 NOTE — PROGRESS NOTES
[facility] MD Joan Gimenez MD Enrique Fogo, MD [place], CNP   Joyce Devlin, NUNU Nick, BRADLEY  Progress Note    1/27/2023    SUBJECTIVE:   1/20: Feeling little better today. Denies new complaint. Still tachypnea, though denies dyspnea no cp/palp Appetite ok ROS otherwise unremarkable    1//21 seen in her room on o2/nc bp stable weak poor po intake   2+ edema in LE     1/22 charts reviewed . On bipap    1/23 : on o2/nc . Still look hypervolemic     1/24 seen in her room comfortable o2/nc . Still with LE edema which seems to be multifactorial and will likely need to accept having some edema on board     1/25 sitting on edge of the bed working with pt . Still with considerable edema in LEs   BP stable     1/26 seen in her room reports of SOB with minimal activity , LE edema still signficant . 1/27 good UO   On 5 L/o2/nc  Still with sob with activity   Tachycardiac with low functional capacity   PROBLEM LIST:    Principal Problem:    Acute on chronic respiratory failure with hypoxia and hypercapnia (HCC)  Resolved Problems:    * No resolved hospital problems. *         DIET:    ADULT DIET; Regular; 3 carb choices (45 gm/meal); Low Fat/Low Chol/High Fiber/2 gm Na; Moderately Thick (Honey); 1800 ml  ADULT ORAL NUTRITION SUPPLEMENT; Dinner;  Other Oral Supplement; Gelatein 20     MEDS (scheduled):    acetaZOLAMIDE (DIAMOX) IVPB  500 mg IntraVENous Q12H    potassium chloride  40 mEq Oral Once    [Held by provider] dilTIAZem  60 mg Oral 3 times per day    sucralfate  1 g Oral 4x Daily    rOPINIRole  1 mg Oral TID    atorvastatin  20 mg Oral Nightly    ipratropium-albuterol  1 vial Inhalation 4x Daily    [Held by provider] insulin glargine  13 Units SubCUTAneous BID    [Held by provider] LORazepam  0.5 mg Oral Nightly    metoprolol succinate  25 mg Oral BID    insulin lispro  0-4 Units SubCUTAneous TID     insulin lispro  0-4 Units SubCUTAneous Nightly budesonide  0.5 mg Nebulization BID    sodium chloride flush  10 mL IntraVENous 2 times per day    miconazole   Topical BID    arformoterol tartrate  15 mcg Nebulization BID    pantoprazole  40 mg Oral QAM AC    apixaban  5 mg Oral BID    mirtazapine  7.5 mg Oral Nightly    aspirin  81 mg Oral Daily    carbidopa-levodopa  1 tablet Oral TID    sertraline  50 mg Oral Daily    montelukast  10 mg Oral Nightly    [Held by provider] midodrine  5 mg Oral TID WC       MEDS (infusions):   bumetanide 0.1 mg/mL infusion 0.75 mg/hr (01/27/23 1116)    sodium chloride      dextrose         MEDS (prn):  loperamide, sodium chloride flush, sodium chloride, promethazine **OR** ondansetron, polyethylene glycol, acetaminophen **OR** acetaminophen, glucose, dextrose bolus **OR** dextrose bolus, glucagon (rDNA), dextrose    PHYSICAL EXAM:     Patient Vitals for the past 24 hrs:   BP Temp Temp src Pulse Resp SpO2 Weight   01/27/23 1445 98/76 97.4 °F (36.3 °C) Infrared (!) 111 20 96 % --   01/27/23 1200 -- -- -- -- -- 96 % --   01/27/23 1100 -- -- -- -- -- 95 % --   01/27/23 0815 -- -- -- -- -- 95 % --   01/27/23 0710 95/66 97.7 °F (36.5 °C) Axillary (!) 120 20 95 % --   01/27/23 0625 -- -- -- -- 22 -- --   01/27/23 0600 -- -- -- -- -- -- 277 lb 8 oz (125.9 kg)   01/27/23 0300 105/63 98.4 °F (36.9 °C) Axillary 79 22 93 % --   01/27/23 0134 -- -- -- -- 16 -- --   01/26/23 2034 110/66 98.5 °F (36.9 °C) Oral 81 18 93 % --     @      Intake/Output Summary (Last 24 hours) at 1/27/2023 1617  Last data filed at 1/27/2023 1441  Gross per 24 hour   Intake 120 ml   Output 3750 ml   Net -3630 ml           Wt Readings from Last 3 Encounters:   01/27/23 277 lb 8 oz (125.9 kg)   01/16/23 259 lb (117.5 kg)   01/16/23 259 lb (117.5 kg)     Age-appropriate morbidly obese white woman, though easily arousable, no apparent distress  NC/AT EOMI sclera conjunctive are clear and anicteric mucous membranes are moist  Neck soft supple trachea midline  Coarse breath sounds with crackles on the right, mildly prolonged expiratory phase but no wheeze. Arguably a little more clear than yesterday  Regular rhythm normal S1 and S2  Abdomen soft nontender nondistended normal active bowel sounds. Abdominal pannus has substantial edema  Distal extremities, thighs, sacrum have substantial chronic type nonpitting edema, though no pitting edema  Pulses 1+ x4  Moves all 4 extremities  Cranial nerves II through XII grossly intact  Awake, alert, interactive and appropriate  Skin tone consistent with chronic venous stasis distally      DATA:    Recent Labs     01/26/23  0749   WBC 10.6   HGB 9.2*   HCT 34.6   MCV 94.3          Recent Labs     01/25/23  0755 01/26/23  0749 01/27/23  0803    146 145   K 4.0 3.6 3.4*    99 99   CO2 35* 38* 37*   BUN 56* 55* 56*   CREATININE 2.2* 2.2* 2.3*         Lab Results   Component Value Date    LABPROT 0.5 (H) 01/18/2023    LABPROT 0.5 01/18/2023     On CT no hydro/signs of obstruction     Urine studies  U Na 21, U Cl 23 U prot:cr ratio 0.5    ASSESSMENT / RECOMMENDATIONS:       Assessment  1. Acute kidney injury urine lytes support decrease effective volume     2. CKD stage III, Baseline creatinine 1.4 to 1.7 mg/dL, secondary to diabetic nephropathy and renal microvascular atherosclerotic disease  0.5 g proteinuria     3. Anemia due to CKD, phlebotomy associated prolonged hospitalization     4. Acute hypercapnic and hypoxic respiratory failure due to COPD exacerbation and pneumonia. Does not appear hypervolemic. 5.  Morbid obesity, BMI 50.6 kg per metered square     6.   Edema/anasarca, chronic, multifactorial, due to venous insufficiency in the setting of morbid obesity, relative immobility, likely diastolic dysfunction though it was \"indeterminate\" on echo, the does have pulmonary hypertension, mild right-sided heart failure     Recommendations    Still look hypervolemic   Azotemia relatively stable      continue bumex drip   Add diamox bid for few doses   Replace K   Compression stocking   Out of bed to chair   Fu serial bmps UO        Electronically signed by Shell Blackwell MD on 1/27/2023

## 2023-01-27 NOTE — PROGRESS NOTES
NAME: Noé Lawson  MR:  [id number]  :   1950  Admit Date:  2023      This is a face to face encounter with [address] [place] of this note, including Diagnosis,  Interval History, Past Medical/Surgical/Family/Social Histories, ROS, physical exam, and Assessment and Plan were copied and pasted from Previous. Updates have been made where noted and reflect current exam and medical decision making from the DOS of this encounter. CHIEF COMPLAINT     ID following for   Chief Complaint   Patient presents with    Respiratory Distress     Normally on 3L NC, came in on CPAP, given total of 50mcg push dose epi for low BP by EMS     HISTORY OF PRESENT ILLNESS     Noé Lawson is a 67 y.o. female who presents with   Chief Complaint   Patient presents with    Respiratory Distress     Normally on 3L NC, came in on CPAP, given total of 50mcg push dose epi for low BP by EMS     2023 and has  has a past medical history of A-fib ([address] Acute on chronic congestive heart failure ([address] Anxiety, Asthma, CAD (coronary artery disease), Cancer ([address] Chronic kidney disease, COPD exacerbation ([address] Depression, Diabetes mellitus ([address] H/O mammogram, Hx MRSA infection, Hyperlipidemia, Hypertension, Lateral epicondylitis, Morbid obesity ([address] SERENA on CPAP, Oxygen dependent, Parkinson's disease ([address] and Tubal ligation status. Pt seen and examined 23  In bed afebrile on bipap no distress  Wbc10.6 c2.3    Patient is tolerating medications. No reported adverse drug reactions.   Available labs, imaging studies, microbiologic studies have been reviewed      Assessment & Plan   Pt was admitted with   NSTEMI (non-ST elevated myocardial infarction) ([address] [I21.4]  Atrial fibrillation with rapid ventricular response ([address] [I48.91]  Recurrent right pleural effusion [J90]  Hypotension, unspecified hypotension type [I95.9]  Acute on chronic respiratory failure with hypoxia and hypercapnia ([address] [O48.76, J96.22]    ID following for   Hypothermia better   Vre uti day   left le cons colonized not infected stable   Right pleural effusion with bibasilar atelectasis. Plan:   Continue Linezolid- Day # 8 of 8 linezolid   Encourage IS     Monitor labs      Continue wound care      Can d/c    BP 95/66   Pulse (!) 120   Temp 97.7 °F (36.5 °C) (Axillary)   Resp 20   Ht 5' (1.524 m)   Wt 277 lb 8 oz (125.9 kg)   SpO2 95%   BMI 54.20 kg/m²   Temp  Av °F (36.7 °C)  Min: 97.5 °F (36.4 °C)  Max: 98.5 °F (36.9 °C)  CONSTITUTIONAL:  no apparent distress in bed   ENT:  Normocephalic, atraumatic,     LUNGS:  No increased work of breathing,  dec  to auscultation bilaterally, on  bipapa  CARDIOVASCULAR:    regular rate and rhythm, normal S1 and S2,     ABDOMEN:  normal bowel sounds, soft, distended, non-tender  MUSCULOSKELETAL:   swelling  BLE. Ue edema   Diaz   NEUROLOGIC:  Awake, alert,      SKIN:   no rashes + bruises  Lines:      Peripheral Intravenous Line:  Peripheral IV 23 Left Antecubital (Active)   Site Assessment Clean, dry & intact 23   Line Status Flushed;Blood return noted;Capped;Normal saline locked 23   Line Care Connections checked and tightened; Chlorhexidine wipes; Line pulled back;Ports disinfected;Cap changed 23   Phlebitis Assessment No symptoms 23   Infiltration Assessment 0 23   Alcohol Cap Used No 23   Dressing Status Clean, dry & intact 23   Dressing Type Transparent 23   Dressing Intervention Other (Comment) 23       Peripheral IV 23 Left;Ventral Forearm (Active)   Site Assessment Clean, dry & intact 23   Line Status Flushed;Brisk blood return; Infusing;Capped 23   Line Care Connections checked and tightened; Chlorhexidine wipes; Line pulled back;Ports disinfected;Cap changed 23   Phlebitis Assessment No symptoms 23   Infiltration Assessment 0 01/20/23 2000   Alcohol Cap Used Yes 01/20/23 2000   Dressing Status Clean, dry & intact 01/20/23 2000   Dressing Type Transparent 01/20/23 2000   Dressing Intervention Other (Comment) 01/20/23 2000     Drain(s):  Urinary Catheter 01/18/23 Diaz-Temperature (Active)   $ Urethral catheter insertion $ Not inserted for procedure 01/18/23 0319   Catheter Indications Need for fluid volume management of the critically ill patient in a critical care setting 01/21/23 0124   Site Assessment Pink;Moist;No urethral drainage 01/21/23 0124   Urine Color Yellow 01/21/23 0124   Urine Appearance Cloudy 01/21/23 0124   Collection Container Standard 01/21/23 0124   Securement Method Securing device (Describe) 01/21/23 0124   Catheter Care Completed Yes 01/20/23 2000   Catheter Best Practices  Drainage tube clipped to bed;Catheter secured to thigh; Tamper seal intact; Bag below bladder;Bag not on floor; Lack of dependent loop in tubing;Drainage bag less than half full 01/21/23 0124   Status Draining;Patent 01/21/23 0124   Output (mL) 250 mL 01/20/23 1400     Microbiology:   No results for input(s): COVID19 in the last 72 hours.   Lab Results   Component Value Date/Time    BC 5 Days no growth 01/17/2023 05:23 PM    BC 5 Days no growth 10/12/2022 04:59 PM    BC 5 Days no growth 10/06/2022 09:47 AM    BLOODCULT2 5 Days no growth 01/17/2023 05:23 PM    BLOODCULT2 5 Days no growth 10/12/2022 04:59 PM    BLOODCULT2 5 Days no growth 10/05/2022 11:19 AM    ORG Enterococcus faecium 01/18/2023 03:26 AM    ORG Staphylococcus epidermidis 01/17/2023 10:10 PM    ORG Escherichia coli 10/05/2022 11:19 AM     CULTURE, RESPIRATORY   Date Value Ref Range Status   07/06/2022 Oral Pharyngeal Shavon absent (A)  Final   07/06/2022 Light growth  Final     WOUND/ABSCESS   Date Value Ref Range Status   01/17/2023 Light growth  Final   07/05/2022 Heavy growth  Final   07/05/2022 Heavy growth  Final     Smear, Respiratory   Date Value Ref Range Status   07/06/2022 Refer to ordered Gram stain for results  Final         Component Value Date/Time    AFBCX No growth after 6 weeks of incubation. 10/18/2022 1106    AFBCX No growth after 6 weeks of incubation. 07/08/2022 1546    FUNGSM No Fungal elements seen 07/08/2022 1546    LABFUNG No growth after 4 weeks of incubation. 07/08/2022 1546     MRSA Culture Only   Date Value Ref Range Status   01/17/2023 Methicillin resistant Staph aureus not isolated  Final     LABS:  Recent Labs     01/26/23  0749   WBC 10.6   RBC 3.67   HGB 9.2*   HCT 34.6   MCV 94.3   MCH 25.1*   MCHC 26.6*   RDW 15.9*      MPV 11.4       Recent Labs     01/25/23  0755 01/26/23  0749 01/27/23  0803    146 145   K 4.0 3.6 3.4*    99 99   CO2 35* 38* 37*   BUN 56* 55* 56*   CREATININE 2.2* 2.2* 2.3*   GLUCOSE 127* 101* 58*   CALCIUM 8.9 9.2 8.8       Lab Results   Component Value Date    SEDRATE 6 07/06/2022    SEDRATE 15 10/24/2021    SEDRATE 115 (H) 02/03/2021     Lab Results   Component Value Date    CRP 5.0 (H) 07/06/2022    CRP 0.7 (H) 10/25/2021    CRP 13.5 (H) 02/03/2021     Lab Results   Component Value Date    PROCAL 0.33 (H) 01/18/2023    PROCAL 0.24 (H) 07/13/2022    PROCAL 0.22 (H) 07/06/2022     REVIEW OF SYSTEMS     As stated above in the chief complaint, otherwise negative.   CURRENT MEDICATIONS     Current Facility-Administered Medications:     bumetanide (BUMEX) 12.5 mg in sodium chloride 0.9 % 125 mL infusion, 0.75 mg/hr, IntraVENous, Continuous, Shefali Chahal MD, Last Rate: 7.5 mL/hr at 01/26/23 1713, 0.75 mg/hr at 01/26/23 1713    acetaZOLAMIDE (DIAMOX) 500 mg in sodium chloride 0.9 % 100 mL IVPB, 500 mg, IntraVENous, Q12H, Shefali Chahal MD, Stopped at 01/27/23 0345    [Held by provider] dilTIAZem (CARDIZEM) tablet 60 mg, 60 mg, Oral, 3 times per day, Morena Arreola MD, 60 mg at 01/20/23 0616    sucralfate (CARAFATE) tablet 1 g, 1 g, Oral, 4x Daily, Sadi Joe MD, 1 g at 01/27/23 0905    rOPINIRole (REQUIP) tablet 1 mg, 1 mg, Oral, TID, Jackson Brewer MD, 1 mg at 01/27/23 1205    atorvastatin (LIPITOR) tablet 20 mg, 20 mg, Oral, Nightly, Jackson Brewer MD, 20 mg at 01/26/23 2049    ipratropium-albuterol (DUONEB) nebulizer solution 3 mL, 1 vial, Inhalation, 4x Daily, Jackson Brewer MD, 3 mL at 01/27/23 0919    insulin glargine (LANTUS) injection vial 13 Units, 13 Units, SubCUTAneous, BID, Jackson Brewer MD, 13 Units at 01/27/23 0913    loperamide (IMODIUM) capsule 2 mg, 2 mg, Oral, 4x Daily PRN, Jackson Brewer MD    [Held by provider] LORazepam (ATIVAN) tablet 0.5 mg, 0.5 mg, Oral, Nightly, Jackson Brewer MD, 0.5 mg at 01/20/23 2317    metoprolol succinate (TOPROL XL) extended release tablet 25 mg, 25 mg, Oral, BID, Jackson Brewer MD, 25 mg at 01/27/23 0926    insulin lispro (HUMALOG) injection vial 0-4 Units, 0-4 Units, SubCUTAneous, TID WC, Jackson Brewer MD, 1 Units at 01/25/23 1744    insulin lispro (HUMALOG) injection vial 0-4 Units, 0-4 Units, SubCUTAneous, Nightly, Jackson Brewer MD, 4 Units at 01/18/23 2158    budesonide (PULMICORT) nebulizer suspension 500 mcg, 0.5 mg, Nebulization, BID, Jackson Brewer MD, 500 mcg at 01/27/23 8460    sodium chloride flush 0.9 % injection 10 mL, 10 mL, IntraVENous, 2 times per day, Jackson Brewer MD, 10 mL at 01/27/23 0911    sodium chloride flush 0.9 % injection 10 mL, 10 mL, IntraVENous, PRN, Jackson Brewer MD    0.9 % sodium chloride infusion, , IntraVENous, PRN, Jackson Brewer MD    promethazine (PHENERGAN) tablet 12.5 mg, 12.5 mg, Oral, Q6H PRN **OR** ondansetron (ZOFRAN) injection 4 mg, 4 mg, IntraVENous, Q6H PRN, Jackson Brewer MD    polyethylene glycol (GLYCOLAX) packet 17 g, 17 g, Oral, Daily PRN, Jackson Brewer MD    acetaminophen (TYLENOL) tablet 650 mg, 650 mg, Oral, Q6H PRN, 650 mg at 01/24/23 1254 **OR** acetaminophen (TYLENOL) suppository 650 mg, 650 mg, Rectal, Q6H PRN, Jackson Brewer MD    miconazole (MICOTIN) 2 % powder, , Topical, BID, Vanessa Birmingham, APRN - CNP, Given at 01/27/23 0910    arformoterol tartrate (BROVANA) nebulizer solution 15 mcg, 15 mcg, Nebulization, BID, Recardo Alexia, APRN - CNP, 15 mcg at 01/27/23 3372    pantoprazole (PROTONIX) tablet 40 mg, 40 mg, Oral, QAM AC, Luke Porginski, APRN - CNP, 40 mg at 01/27/23 0606    glucose chewable tablet 16 g, 4 tablet, Oral, PRN, Recardo Alexia, APRN - CNP    dextrose bolus 10% 125 mL, 125 mL, IntraVENous, PRN **OR** dextrose bolus 10% 250 mL, 250 mL, IntraVENous, PRN, Recardo Alexia, APRN - CNP    glucagon (rDNA) injection 1 mg, 1 mg, SubCUTAneous, PRN, Recardo Alexia, APRN - CNP    dextrose 10 % infusion, , IntraVENous, Continuous PRN, Recardo Alexia, APRN - CNP    apixaban (ELIQUIS) tablet 5 mg, 5 mg, Oral, BID, Recardo Alexia, APRN - CNP, 5 mg at 01/27/23 5926    mirtazapine (REMERON) tablet 7.5 mg, 7.5 mg, Oral, Nightly, Recardo Alexia, APRN - CNP, 7.5 mg at 01/26/23 2049    aspirin EC tablet 81 mg, 81 mg, Oral, Daily, Luke Rafiqginski, APRN - CNP, 81 mg at 01/27/23 3265    carbidopa-levodopa (SINEMET CR)  MG per extended release tablet 1 tablet, 1 tablet, Oral, TID, Recashirao Alexia, APRN - CNP, 1 tablet at 01/27/23 2590    sertraline (ZOLOFT) tablet 50 mg, 50 mg, Oral, Daily, Recardo Alexia, APRN - CNP, 50 mg at 01/27/23 0905    montelukast (SINGULAIR) tablet 10 mg, 10 mg, Oral, Nightly, Recardo Alexia, APRN - CNP, 10 mg at 01/26/23 2049    [Held by provider] midodrine (PROAMATINE) tablet 5 mg, 5 mg, Oral, TID WC, Recardo Alexia, APRN - CNP  DIAGNOSTIC RESULTS   Radiology:  CT ABDOMEN PELVIS WO CONTRAST Additional Contrast? None    Result Date: 1/18/2023  EXAMINATION: CT OF THE ABDOMEN AND PELVIS WITHOUT CONTRAST 1/18/2023 5:47 am TECHNIQUE: CT of the abdomen and pelvis was performed without the administration of intravenous contrast. Multiplanar reformatted images are provided for review.  Automated exposure control, iterative reconstruction, and/or weight based adjustment of the mA/kV was utilized to reduce the radiation dose to as low as reasonably achievable. COMPARISON: October 8, 2022 HISTORY: ORDERING SYSTEM PROVIDED HISTORY: R/O obstructive uropathy TECHNOLOGIST PROVIDED HISTORY: Reason for exam:->R/O obstructive uropathy Additional Contrast?->None FINDINGS: Lower Chest: The heart is enlarged. There is a small pericardial effusion and at least a moderate size right pleural effusion is partially imaged. A trace left pleural effusion is noted. Left lower lobe subsegmental atelectasis is noted. Organs: The gallbladder is surgically absent. The liver has an unremarkable unenhanced appearance. The spleen is enlarged measuring 15.9 cm in craniocaudal dimension. The unenhanced pancreas demonstrates diffuse fatty atrophy. The kidneys are without hydronephrosis or radiopaque calculus. GI/Bowel: No evidence of a bowel obstruction, free air or pneumatosis. Portions of the colon are decompressed, limiting assessment for mucosal based abnormalities. The appendix is not confidently visualized. No obvious pericecal inflammatory changes to suggest acute appendicitis. Pelvis: The urinary bladder is decompressed around a Diaz catheter. A small amount of the intraluminal gas in the urinary bladder may be related to recent instrumentation. A small amount of gas is present in the uterine fundus, apparently within the endometrial canal.  The ovaries are not confidently visualized. There are scattered visible but nonenlarged pelvic lymph nodes. Peritoneum/Retroperitoneum: A small amount of ascites is present, predominantly in a perihepatic distribution. The abdominal aorta is mildly calcified without evidence of aneurysm. No retroperitoneal lymphadenopathy or mass. Bones/Soft Tissues: No acute osseous abnormality or evidence of an aggressive osseous lesion. Anasarca is present throughout the soft tissues. No nephroureterolithiasis or hydronephrosis. Splenomegaly. Small volume of perihepatic ascites.  At least moderate-sized partially imaged right pleural effusion with bibasilar atelectasis. Several locules of gas which appear to be within the endometrium at the level of the uterine fundus, of uncertain etiology or clinical significance. Please correlate clinically. Diffuse anasarca throughout the soft tissues. Cardiomegaly. XR FOOT RIGHT (MIN 3 VIEWS)    Result Date: 1/16/2023  EXAMINATION: THREE XRAY VIEWS OF THE RIGHT FOOT 1/16/2023 12:40 pm COMPARISON: None. HISTORY: ORDERING SYSTEM PROVIDED HISTORY: Evaluate big toe wound TECHNOLOGIST PROVIDED HISTORY: Reason for exam:->Evaluate big toe wound FINDINGS: Soft tissue wound evident at the great toe. No radiographically visible acute osteomyelitis. 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 No radiopaque foreign body or soft tissue emphysema. Plantar heel spur noted. No evident acute osteomyelitis. Great toe soft tissue ulcer. Soft tissue swelling. See above. XR CHEST PORTABLE    Result Date: 1/17/2023  EXAMINATION: ONE XRAY VIEW OF THE CHEST 1/17/2023 5:38 pm COMPARISON: 01/16/2023 HISTORY: ORDERING SYSTEM PROVIDED HISTORY: sob TECHNOLOGIST PROVIDED HISTORY: Reason for exam:->sob FINDINGS: The lungs are without acute focal process. Stable right pleural effusion. No pneumothorax. Stable heart size. The osseous structures are without acute process. Stable right pleural effusion. XR CHEST PORTABLE    Result Date: 1/16/2023  EXAMINATION: ONE XRAY VIEW OF THE CHEST 1/16/2023 8:53 am COMPARISON: 30 December 2022 HISTORY: ORDERING SYSTEM PROVIDED HISTORY: SOB TECHNOLOGIST PROVIDED HISTORY: Reason for exam:->SOB FINDINGS: Stable right pleural effusion with adjacent atelectasis and or infiltrate. Stable cardiomediastinal silhouette. No new abnormal findings. Stable right-sided pleural effusion with adjacent atelectasis and or infiltrate.      XR CHEST 1 VIEW    Result Date: 12/30/2022  EXAMINATION: ONE XRAY VIEW OF THE CHEST 12/30/2022 3:21 pm COMPARISON: 12/19/2022 HISTORY: ORDERING SYSTEM PROVIDED HISTORY: SOB TECHNOLOGIST PROVIDED HISTORY: Reason for exam:->SOB FINDINGS: The heart is enlarged. There is a right pleural effusion right lung base atelectasis. I cannot exclude partial collapse of the right lower lobe. The left lung is clear. There is no pneumothorax. 1. Right pleural effusion right lung base atelectasis. 2. I cannot exclude partial collapse of the right lower lobe 3. Cardiomegaly      Imaging and labs were reviewed per medical records. Thank you for involving me in the care of Shine Holland I will continue to follow. Please do not hesitate to call [phone] for any questions or concerns.     Electronically signed by Enrique Mccarty MD on 1/27/2023 at 11:13 AM

## 2023-01-27 NOTE — CARE COORDINATION
SS NOTE: COVID NEGATIVE 1/17, PT WILL NEED A RAPID NEGATIVE COVID TEST TO RETURN TO [facility] SKILLED. Pt is on room air. She is also on bipap at 40%. Pt has positive blood cultures. Pt has a peripheral line. She is on IV Lasix twice daily and oral Zyvox. Pt is on a Bumex drip and oral Cardiazem. ID is on. She has a genao. She has a wound on her left lower leg. Pt is going to return to long term care at [facility] when dched. Pt is out of skilled days there and they will activate Medicaid for return there. For the return pt will need NO PRECERT, and will need a signed [place]. In regard to pt's concern that the Bipap here is more effective that the bipap at the [facility]. SW shared pt's bipap settings with [facility] and their settings was different- that set up the bipap there to be same setting as here. SS to continue. ESTHER Perez.1/27/2023.1:20PM

## 2023-01-27 NOTE — PROGRESS NOTES
attempted to meet with patient. Patient was with staff.  attempted to contact family and left a voice message inviting a call back.  provided prayer for patient outside her room. Spiritual care is ongoing.

## 2023-01-27 NOTE — PROGRESS NOTES
Physical Therapy  Physical Therapy Treatment Note/Plan of Care    Room #:  8912/5872-99  Patient Name: Kareem Pickens  YOB: 1950  MRN: [id number]    Date of Service: 1/27/2023     Tentative placement recommendation: Subacute Rehab  Equipment recommendation:  To be determined      Evaluating Physical Therapist: Victor Hugo Bowers PT, DPT #[id number]      Specific Provider Orders/Date/Referring Provider :     01/19/23 0745    PT eval and treat  Start:  01/19/23 0745,   End:  01/19/23 0745,   ONE TIME,   Standing Count:  1 Occurrences,   Verito Romero MD Acknowledge New     Admitting Diagnosis:   NSTEMI (non-ST elevated myocardial infarction) [facility]) [I21.4]  Atrial fibrillation with rapid ventricular response (HCC) [I48.91]  Recurrent right pleural effusion [J90]  Hypotension, unspecified hypotension type [I95.9]  Acute on chronic respiratory failure with hypoxia and hypercapnia (HCC) [J96.21, J96.22]      Surgery: none  Visit Diagnoses         Codes    Recurrent right pleural effusion     J90    NSTEMI (non-ST elevated myocardial infarction) ([facility] [address]     I21.4            Patient Active Problem List   Diagnosis    Depression with anxiety    Osteoporosis    Asthma    Hyperlipidemia    Mitral regurgitation    Obstructive sleep apnea syndrome    Psoriasis    Diabetes mellitus ([facility] [address]    Parkinson's disease ([facility] [address]    Primary hypertension    Microalbuminuria    Morbid obesity (HCC)    RLS (restless legs syndrome)    Generalized weakness    Inability to walk    Hypothyroidism    Chest pain    Acute asthma exacerbation    Asthma exacerbation, mild    Paroxysmal atrial fibrillation (HCC)    Atrial fibrillation with rapid ventricular response (HCC)    Acute on chronic congestive heart failure ([address]    Coronary artery disease involving native coronary artery of native heart without angina pectoris    Dysphagia    Hepatosplenomegaly    Acute decompensated heart failure (HCC)    Acute diastolic (congestive) heart failure (HCC)    Nonrheumatic tricuspid valve regurgitation    Tobacco abuse    Recurrent syncope    Septic shock (HCC)    Seizure-like activity (HCC)    Acute kidney injury ([facility]    Pancytopenia (HCC)    Thrombocytopenia (HCC)    Encephalopathy    Acute respiratory failure with hypoxia (HCC)    Lactic acidosis    Delirium    Acute on chronic anemia    Pleural effusion, right    Acute respiratory failure with hypoxia and hypercapnia (HCC)    Acute confusion    Acute on chronic diastolic heart failure (HCC)    Macrocytosis    Other specified anemias    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[facility] [address]    Puerperal sepsis with acute hypoxic respiratory failure without septic shock (HCC)    Pulmonary HTN (HCC)    Chronic anticoagulation    RVF (right ventricular failure) (HCC)    Metabolic alkalosis    Sepsis (HCC)    COPD exacerbation (HCC)    Acute on chronic respiratory failure with hypercapnia (HCC)    Persistent atrial fibrillation (HCC)    Hypercapnic respiratory failure (HCC)    Acute on chronic respiratory failure (HCC)    Hyperkalemia    Heart failure (HCC)    Acute renal insufficiency    Hypotension    Anemia    Acute on chronic respiratory failure with hypoxia and hypercapnia (HCC)        ASSESSMENT of Current Deficits Patient exhibits decreased strength, balance, and endurance impairing functional mobility, transfers, gait , gait distance, and tolerance to activity. Pt able to sit EOB for 20 minutes and performed seated exercises sitting EOB. Pt was incontinent of the bowel during session with assistance needed from nursing for cleanup.        PHYSICAL THERAPY  PLAN OF CARE       Physical therapy plan of care is established based on physician order,  patient diagnosis and clinical assessment    Current Treatment Recommendations:    -Bed Mobility: Lower extremity exercises  and Trunk control activities   -Sitting Balance: Incorporate reaching activities to activate trunk muscles , Hands on support to maintain midline , Facilitate active trunk muscle engagement , Facilitate postural control in all planes , and Engage in core activities to allow for movement within base of support   -Standing Balance: Perform strengthening exercises in standing to promote motor control with or without upper extremity support , Instruct patient on adequate base of support to maintain balance, and Challenge balance utilizing reaching  activities beyond center of gravity    -Transfers: Provide instruction on proper hand and foot position for adequate transfer of weight onto lower extremities and use of gait device if needed, Cues for hand placement, technique and safety. Provide stabilization to prevent fall , Facilitate weight shift forward on to lower extremities and provide necessary stabilization of bilateral lower extremities , Support transfer of weight on to lower extremities, and Assist with extension of knees trunk and hip to accept weight transfer   -Gait: Gait training, Standing activities to improve: base of support, weight shift, weight bearing , Exercises to improve trunk control, Exercises to improve hip and knee control, Performance of protected weight bearing activities, and Activities to increase weight bearing   -Endurance: Utilize Supervised activities to increase level of endurance to allow for safe functional mobility including transfers and gait  and Use graduated activities to promote good breathing techniques and provide support and education to maximize respiratory function    PT long term treatment goals are located in below grid    Patient and or family understand(s) diagnosis, prognosis, and plan of care. Frequency of treatments: Patient will be seen  3-5 times/week.          Prior Level of Function: Patient ambulated with wheeled walker for short distances  Rehab Potential: fair + for baseline    Past medical history:   Past Medical History:   Diagnosis Date    A-fib ([facility] [address]     Acute on chronic congestive heart failure [facility])     Anxiety     Asthma     CAD (coronary artery disease) 01/21/2016    Cancer ([facility]  breast ca 2006    right lumpectomy    Chronic kidney disease     nephrolithiasis    COPD exacerbation ([facility] 10/12/2022    Depression     Diabetes mellitus ([facility]     H/O mammogram     Hx MRSA infection     toe infection january 2012    Hyperlipidemia     Hypertension     Lateral epicondylitis     Morbid obesity (HCC)     SERENA on CPAP     Oxygen dependent     Parkinson's disease ([facility]     Tubal ligation status      Past Surgical History:   Procedure Laterality Date    BREAST LUMPECTOMY      BREAST REDUCTION SURGERY      CARDIAC CATHETERIZATION  4/28/2014    Dr. Talia Aj  01/11/2022    Dr. Sharan Rowe  7/29/15    CT GUIDED CHEST TUBE  7/8/2022    CT GUIDED CHEST TUBE 7/8/2022 Gian Stevens MD [facility]    ENDOSCOPY, COLON, DIAGNOSTIC  7/19/15    GALLBLADDER SURGERY      LUMBAR LAMINECTOMY      TOE AMPUTATION      TONSILLECTOMY      UPPER GASTROINTESTINAL ENDOSCOPY      UPPER GASTROINTESTINAL ENDOSCOPY N/A 7/19/2022    EGD ESOPHAGOGASTRODUODENOSCOPY performed by Natalia Vegas MD at [address]:    Precautions:  Up with assistance, falls, O2, and morbid obesity      Social history: Patient from SNF    Equipment owned: Wheelchair, Wheeled Walker, and [address] [place] [address] [address] Mobility Inpatient   How much difficulty turning over in bed?: A Lot  How much difficulty sitting down on / standing up from a chair with arms?: A Lot  How much difficulty moving from lying on back to sitting on side of bed?: A Lot  How much help from another person moving to and from a bed to a chair?: Total  How much help from another person needed to walk in hospital room?: Total  How much help from another person for climbing 3-5 steps with a railing?: Total  AM-PAC Inpatient Mobility Raw Score : 9  AM-PAC Inpatient T-Scale Score : 30.55  Mobility Inpatient CMS 0-100% Score: 81.38  Mobility Inpatient CMS G-Code Modifier : CM    Nursing cleared patient for PT treatment. OBJECTIVE;   Initial Evaluation  Date: 1/19/2023 Treatment Date:  1/27/2023       Short Term/ Long Term   Goals   Was pt agreeable to Eval/treatment? Yes yes To be met in 3 days   Pain level   0/10   0/10    Bed Mobility    Rolling: Maximal assist of 1    Supine to sit: Maximal assist of 1    Sit to supine: Maximal assist of 1    Scooting: Maximal assist of 1   Rolling: Moderate assist of 1   Supine to sit: Moderate assist of 1   Sit to supine: Moderate assist of 1   Scooting: Moderate assist of 1    Rolling: Minimal assist of 1    Supine to sit: Minimal assist of 1    Sit to supine: Minimal assist of 1    Scooting: Minimal assist of 1     Transfers Sit to stand: Not assessed  d/t fair- seated balance and pt declining standing Sit to stand: Moderate assist of 1      Sit to stand:  Moderate assist of 1    Ambulation    not assessed     > 15 feet using  wheeled walker with Moderate assist of 1    Stair negotiation: ascended and descended   Not assessed           ROM Within functional limits    Increase range of motion 10% of affected joints    Strength BUE:  refer to OT eval  RLE:  4-/5  LLE:  4-/5  Increase strength in affected mm groups by 1/3 grade   Balance Sitting EOB:  fair -  Dynamic Standing:  not assessed  Sitting EOB: fair   Dynamic Standing: not assessed    Sitting EOB:  fair   Dynamic Standing: fair - with [employer]     Patient is Alert & Oriented x person, place, time, and situation and follows directions    Sensation:  Patient  denies numbness/tingling   Edema:  no   Endurance: fair -    Vitals:  7 liters nasal cannula   Blood Pressure at rest  Blood Pressure during session    Heart Rate at rest  Heart Rate during session    SPO2 at rest %  SPO2 during session 87-97%     Patient education  Patient educated on role of Physical Therapy, risks of immobility, safety and plan of care, energy conservation, importance of mobility while in hospital , ankle pumps, quad set and glut set for edema control, blood clot prevention, importance and purpose of adaptive device and adjusted to proper height for the patient. , and safety      Patient response to education:   Pt verbalized understanding Pt demonstrated skill Pt requires further education in this area   Yes Partial Yes      Treatment:  Patient practiced and was instructed/facilitated in the following treatment: Patient performed bed mobility, transfers, exercises sitting EOB. Therapeutic Exercises:  ankle pumps, heel raises, hip abduction/adduction, long arc quad, and seated marching, x 15 reps. At end of session, patient in bed with alarm call light and phone within reach,  all lines and tubes intact, nursing notified. Patient would benefit from continued skilled Physical Therapy to improve functional independence and quality of life.          Patient's/ family goals   rehab    Time in 1033  Time out 1102    Total Treatment Time  29 minutes      CPT codes:    Therapeutic activities (96619)   11 minutes  1 unit(s)  Therapeutic exercises (61338)   18 minutes  1 unit(s)    Damon Foster PT License Number [id number]

## 2023-01-27 NOTE — PLAN OF CARE
Problem: Discharge Planning  Goal: Discharge to home or other facility with appropriate resources  Outcome: Progressing  Flowsheets (Taken 1/27/2023 0815)  Discharge to home or other facility with appropriate resources: Identify barriers to discharge with patient and caregiver     Problem: Respiratory - Adult  Goal: Achieves optimal ventilation and oxygenation  Outcome: Progressing  Flowsheets (Taken 1/27/2023 0815)  Achieves optimal ventilation and oxygenation: Assess for changes in respiratory status     Problem: Cardiovascular - Adult  Goal: Maintains optimal cardiac output and hemodynamic stability  Outcome: Progressing  Flowsheets (Taken 1/27/2023 0815)  Maintains optimal cardiac output and hemodynamic stability: Monitor blood pressure and heart rate  Goal: Absence of cardiac dysrhythmias or at baseline  Outcome: Progressing  Flowsheets (Taken 1/27/2023 0815)  Absence of cardiac dysrhythmias or at baseline: Monitor cardiac rate and rhythm     Problem: Genitourinary - Adult  Goal: Absence of urinary retention  Outcome: Progressing  Flowsheets (Taken 1/27/2023 0815)  Absence of urinary retention: Monitor intake/output and perform bladder scan as needed  Goal: Urinary catheter remains patent  Outcome: Progressing  Flowsheets (Taken 1/27/2023 0815)  Urinary catheter remains patent: Assess patency of urinary catheter     Problem: Chronic Conditions and Co-morbidities  Goal: Patient's chronic conditions and co-morbidity symptoms are monitored and maintained or improved  Outcome: Progressing  Flowsheets (Taken 1/27/2023 0815)  Care Plan - Patient's Chronic Conditions and Co-Morbidity Symptoms are Monitored and Maintained or Improved: Monitor and assess patient's chronic conditions and comorbid symptoms for stability, deterioration, or improvement     Problem: Skin/Tissue Integrity  Goal: Absence of new skin breakdown  Description: 1. Monitor for areas of redness and/or skin breakdown  2.   Assess vascular access sites hourly  3. Every 4-6 hours minimum:  Change oxygen saturation probe site  4. Every 4-6 hours:  If on nasal continuous positive airway pressure, respiratory therapy assess nares and determine need for appliance change or resting period.   Outcome: Progressing     Problem: Safety - Adult  Goal: Free from fall injury  Outcome: Progressing     Problem: ABCDS Injury Assessment  Goal: Absence of physical injury  Outcome: Progressing     Problem: Pain  Goal: Verbalizes/displays adequate comfort level or baseline comfort level  Outcome: Progressing  Flowsheets (Taken 1/27/2023 5315)  Verbalizes/displays adequate comfort level or baseline comfort level: Encourage patient to monitor pain and request assistance     Problem: Nutrition Deficit:  Goal: Optimize nutritional status  Outcome: Progressing

## 2023-01-28 LAB
ANION GAP SERPL CALCULATED.3IONS-SCNC: 10 MMOL/L (ref 7–16)
B.E.: 13.1 MMOL/L (ref -3–3)
BUN BLDV-MCNC: 58 MG/DL (ref 6–23)
CALCIUM SERPL-MCNC: 8.9 MG/DL (ref 8.6–10.2)
CHLORIDE BLD-SCNC: 99 MMOL/L (ref 98–107)
CO2: 38 MMOL/L (ref 22–29)
COHB: 0.3 % (ref 0–1.5)
COMMENT: ABNORMAL
CREAT SERPL-MCNC: 2.3 MG/DL (ref 0.5–1)
CRITICAL: ABNORMAL
DATE ANALYZED: ABNORMAL
DATE OF COLLECTION: ABNORMAL
GFR SERPL CREATININE-BSD FRML MDRD: 22 ML/MIN/1.73
GLUCOSE BLD-MCNC: 89 MG/DL (ref 74–99)
HCO3: 43 MMOL/L (ref 22–26)
HHB: 8.1 % (ref 0–5)
LAB: ABNORMAL
Lab: ABNORMAL
METER GLUCOSE: 137 MG/DL (ref 74–99)
METER GLUCOSE: 249 MG/DL (ref 74–99)
METER GLUCOSE: 90 MG/DL (ref 74–99)
METER GLUCOSE: 98 MG/DL (ref 74–99)
METHB: 0.4 % (ref 0–1.5)
MODE: ABNORMAL
O2 CONTENT: 13.1 ML/DL
O2 SATURATION: 91.8 % (ref 92–98.5)
O2HB: 91.2 % (ref 94–97)
OPERATOR ID: 2873
PATIENT TEMP: 37 C
PCO2: 95.5 MMHG (ref 35–45)
PH BLOOD GAS: 7.27 (ref 7.35–7.45)
PO2: 67.3 MMHG (ref 75–100)
POTASSIUM SERPL-SCNC: 4.2 MMOL/L (ref 3.5–5)
SODIUM BLD-SCNC: 147 MMOL/L (ref 132–146)
SOURCE, BLOOD GAS: ABNORMAL
THB: 10.2 G/DL (ref 11.5–16.5)
TIME ANALYZED: 1432

## 2023-01-28 PROCEDURE — 82805 BLOOD GASES W/O2 SATURATION: CPT

## 2023-01-28 PROCEDURE — 2580000003 HC RX 258: Performed by: INTERNAL MEDICINE

## 2023-01-28 PROCEDURE — 94640 AIRWAY INHALATION TREATMENT: CPT

## 2023-01-28 PROCEDURE — 36415 COLL VENOUS BLD VENIPUNCTURE: CPT

## 2023-01-28 PROCEDURE — 2500000003 HC RX 250 WO HCPCS: Performed by: INTERNAL MEDICINE

## 2023-01-28 PROCEDURE — 80048 BASIC METABOLIC PNL TOTAL CA: CPT

## 2023-01-28 PROCEDURE — 2060000000 HC ICU INTERMEDIATE R&B

## 2023-01-28 PROCEDURE — 94660 CPAP INITIATION&MGMT: CPT

## 2023-01-28 PROCEDURE — 6370000000 HC RX 637 (ALT 250 FOR IP): Performed by: INTERNAL MEDICINE

## 2023-01-28 PROCEDURE — 2700000000 HC OXYGEN THERAPY PER DAY

## 2023-01-28 PROCEDURE — 6360000002 HC RX W HCPCS: Performed by: INTERNAL MEDICINE

## 2023-01-28 PROCEDURE — 99233 SBSQ HOSP IP/OBS HIGH 50: CPT | Performed by: INTERNAL MEDICINE

## 2023-01-28 PROCEDURE — 36600 WITHDRAWAL OF ARTERIAL BLOOD: CPT

## 2023-01-28 PROCEDURE — 6370000000 HC RX 637 (ALT 250 FOR IP): Performed by: NURSE PRACTITIONER

## 2023-01-28 PROCEDURE — 82962 GLUCOSE BLOOD TEST: CPT

## 2023-01-28 PROCEDURE — 6360000002 HC RX W HCPCS: Performed by: NURSE PRACTITIONER

## 2023-01-28 RX ORDER — 0.9 % SODIUM CHLORIDE 0.9 %
500 INTRAVENOUS SOLUTION INTRAVENOUS ONCE
Status: COMPLETED | OUTPATIENT
Start: 2023-01-28 | End: 2023-01-28

## 2023-01-28 RX ADMIN — ASPIRIN 81 MG: 81 TABLET, COATED ORAL at 10:42

## 2023-01-28 RX ADMIN — IPRATROPIUM BROMIDE AND ALBUTEROL SULFATE 3 ML: .5; 2.5 SOLUTION RESPIRATORY (INHALATION) at 17:19

## 2023-01-28 RX ADMIN — ARFORMOTEROL TARTRATE 15 MCG: 15 SOLUTION RESPIRATORY (INHALATION) at 17:19

## 2023-01-28 RX ADMIN — ATORVASTATIN CALCIUM 20 MG: 20 TABLET, FILM COATED ORAL at 23:10

## 2023-01-28 RX ADMIN — SUCRALFATE 1 G: 1 TABLET ORAL at 10:42

## 2023-01-28 RX ADMIN — ANTI-FUNGAL POWDER MICONAZOLE NITRATE TALC FREE: 1.42 POWDER TOPICAL at 10:44

## 2023-01-28 RX ADMIN — ROPINIROLE HYDROCHLORIDE 1 MG: 1 TABLET, FILM COATED ORAL at 23:10

## 2023-01-28 RX ADMIN — APIXABAN 5 MG: 5 TABLET, FILM COATED ORAL at 23:10

## 2023-01-28 RX ADMIN — MIRTAZAPINE 7.5 MG: 15 TABLET, FILM COATED ORAL at 23:11

## 2023-01-28 RX ADMIN — MONTELUKAST SODIUM 10 MG: 10 TABLET, FILM COATED ORAL at 23:11

## 2023-01-28 RX ADMIN — ACETAZOLAMIDE 500 MG: 500 INJECTION, POWDER, LYOPHILIZED, FOR SOLUTION INTRAVENOUS at 15:41

## 2023-01-28 RX ADMIN — CARBIDOPA AND LEVODOPA 1 TABLET: 50; 200 TABLET, EXTENDED RELEASE ORAL at 15:40

## 2023-01-28 RX ADMIN — PANTOPRAZOLE SODIUM 40 MG: 40 TABLET, DELAYED RELEASE ORAL at 05:28

## 2023-01-28 RX ADMIN — APIXABAN 5 MG: 5 TABLET, FILM COATED ORAL at 10:00

## 2023-01-28 RX ADMIN — CARBIDOPA AND LEVODOPA 1 TABLET: 50; 200 TABLET, EXTENDED RELEASE ORAL at 10:41

## 2023-01-28 RX ADMIN — CARBIDOPA AND LEVODOPA 1 TABLET: 50; 200 TABLET, EXTENDED RELEASE ORAL at 23:12

## 2023-01-28 RX ADMIN — ROPINIROLE HYDROCHLORIDE 1 MG: 1 TABLET, FILM COATED ORAL at 10:00

## 2023-01-28 RX ADMIN — SODIUM CHLORIDE 500 ML: 9 INJECTION, SOLUTION INTRAVENOUS at 22:30

## 2023-01-28 RX ADMIN — ANTI-FUNGAL POWDER MICONAZOLE NITRATE TALC FREE: 1.42 POWDER TOPICAL at 23:12

## 2023-01-28 RX ADMIN — SUCRALFATE 1 G: 1 TABLET ORAL at 12:28

## 2023-01-28 RX ADMIN — IPRATROPIUM BROMIDE AND ALBUTEROL SULFATE 3 ML: .5; 2.5 SOLUTION RESPIRATORY (INHALATION) at 10:09

## 2023-01-28 RX ADMIN — IPRATROPIUM BROMIDE AND ALBUTEROL SULFATE 3 ML: .5; 2.5 SOLUTION RESPIRATORY (INHALATION) at 14:02

## 2023-01-28 RX ADMIN — METOPROLOL SUCCINATE 25 MG: 25 TABLET, FILM COATED, EXTENDED RELEASE ORAL at 10:41

## 2023-01-28 RX ADMIN — ACETAMINOPHEN 650 MG: 325 TABLET ORAL at 23:10

## 2023-01-28 RX ADMIN — ACETAZOLAMIDE 500 MG: 500 INJECTION, POWDER, LYOPHILIZED, FOR SOLUTION INTRAVENOUS at 05:27

## 2023-01-28 RX ADMIN — BUMETANIDE 0.75 MG/HR: 0.25 INJECTION INTRAMUSCULAR; INTRAVENOUS at 05:25

## 2023-01-28 RX ADMIN — ROPINIROLE HYDROCHLORIDE 1 MG: 1 TABLET, FILM COATED ORAL at 15:40

## 2023-01-28 RX ADMIN — SUCRALFATE 1 G: 1 TABLET ORAL at 18:12

## 2023-01-28 RX ADMIN — BUDESONIDE 500 MCG: 0.5 SUSPENSION RESPIRATORY (INHALATION) at 17:19

## 2023-01-28 RX ADMIN — SERTRALINE 50 MG: 50 TABLET, FILM COATED ORAL at 10:41

## 2023-01-28 RX ADMIN — SUCRALFATE 1 G: 1 TABLET ORAL at 23:10

## 2023-01-28 RX ADMIN — Medication 10 ML: at 10:43

## 2023-01-28 RX ADMIN — INSULIN LISPRO 1 UNITS: 100 INJECTION, SOLUTION INTRAVENOUS; SUBCUTANEOUS at 18:11

## 2023-01-28 RX ADMIN — BUDESONIDE 500 MCG: 0.5 SUSPENSION RESPIRATORY (INHALATION) at 06:23

## 2023-01-28 RX ADMIN — ARFORMOTEROL TARTRATE 15 MCG: 15 SOLUTION RESPIRATORY (INHALATION) at 06:23

## 2023-01-28 RX ADMIN — IPRATROPIUM BROMIDE AND ALBUTEROL SULFATE 3 ML: .5; 2.5 SOLUTION RESPIRATORY (INHALATION) at 06:23

## 2023-01-28 NOTE — PROGRESS NOTES
[facility] MD Zachery Luna MD Ronny Alto, MD Kittie Hover, BRADLEY Devlin, ANP  Anthony Horowitz, BRADLEY  Progress Note    1/28/2023    SUBJECTIVE:   1/20: Feeling little better today. Denies new complaint. Still tachypnea, though denies dyspnea no cp/palp Appetite ok ROS otherwise unremarkable    1//21 seen in her room on o2/nc bp stable weak poor po intake   2+ edema in LE     1/22 charts reviewed . On bipap    1/23 : on o2/nc . Still look hypervolemic     1/24 seen in her room comfortable o2/nc . Still with LE edema which seems to be multifactorial and will likely need to accept having some edema on board     1/25 sitting on edge of the bed working with pt . Still with considerable edema in LEs   BP stable     1/26 seen in her room reports of SOB with minimal activity , LE edema still signficant . 1/27 good UO On 5 L/o2/nc  Still with sob with activity Tachycardiac with low functional capacity     1/28: Asleep, resting and breathing comfortably on nasal cannula. Remains edematous. Ongoing fatigue and generalized weakness. PROBLEM LIST:    Principal Problem:    Acute on chronic respiratory failure with hypoxia and hypercapnia (HCC)  Resolved Problems:    * No resolved hospital problems. *         DIET:    ADULT DIET; Regular; 3 carb choices (45 gm/meal); Low Fat/Low Chol/High Fiber/2 gm Na; Moderately Thick (Honey); 1800 ml  ADULT ORAL NUTRITION SUPPLEMENT; Dinner;  Other Oral Supplement; Gelatein 20     MEDS (scheduled):    [Held by provider] dilTIAZem  60 mg Oral 3 times per day    sucralfate  1 g Oral 4x Daily    rOPINIRole  1 mg Oral TID    atorvastatin  20 mg Oral Nightly    ipratropium-albuterol  1 vial Inhalation 4x Daily    [Held by provider] insulin glargine  13 Units SubCUTAneous BID    [Held by provider] LORazepam  0.5 mg Oral Nightly    metoprolol succinate  25 mg Oral BID    insulin lispro  0-4 Units SubCUTAneous TID     insulin lispro 0-4 Units SubCUTAneous Nightly    budesonide  0.5 mg Nebulization BID    sodium chloride flush  10 mL IntraVENous 2 times per day    miconazole   Topical BID    arformoterol tartrate  15 mcg Nebulization BID    pantoprazole  40 mg Oral QAM AC    apixaban  5 mg Oral BID    mirtazapine  7.5 mg Oral Nightly    aspirin  81 mg Oral Daily    carbidopa-levodopa  1 tablet Oral TID    sertraline  50 mg Oral Daily    montelukast  10 mg Oral Nightly    [Held by provider] midodrine  5 mg Oral TID WC       MEDS (infusions):   bumetanide 0.1 mg/mL infusion 0.75 mg/hr (01/28/23 0525)    sodium chloride      dextrose         MEDS (prn):  loperamide, sodium chloride flush, sodium chloride, promethazine **OR** ondansetron, polyethylene glycol, acetaminophen **OR** acetaminophen, glucose, dextrose bolus **OR** dextrose bolus, glucagon (rDNA), dextrose    PHYSICAL EXAM:     Patient Vitals for the past 24 hrs:   BP Temp Temp src Pulse Resp SpO2   01/28/23 1442 -- -- -- -- -- 98 %   01/28/23 1036 (!) 91/58 97.9 °F (36.6 °C) Oral 94 18 98 %   01/28/23 1009 -- -- -- -- -- 95 %   01/28/23 0623 -- -- -- -- -- 92 %   01/28/23 0515 (!) 90/53 -- -- 92 -- --   01/28/23 0345 (!) 96/55 -- -- (!) 104 -- --   01/28/23 0215 (!) 84/40 -- -- (!) 110 -- 92 %   01/28/23 0144 -- -- -- -- 22 --   01/27/23 2345 112/68 -- -- 88 -- --   01/27/23 2200 -- -- -- (!) 142 -- --   01/27/23 2145 (!) 190/118 97.8 °F (36.6 °C) Oral (!) 115 20 95 %   @      Intake/Output Summary (Last 24 hours) at 1/28/2023 1810  Last data filed at 1/28/2023 1707  Gross per 24 hour   Intake 240 ml   Output 1800 ml   Net -1560 ml         Wt Readings from Last 3 Encounters:   01/27/23 277 lb 8 oz (125.9 kg)   01/16/23 259 lb (117.5 kg)   01/16/23 259 lb (117.5 kg)     Age-appropriate morbidly obese white woman, though easily arousable, no apparent distress  NC/AT EOMI sclera conjunctive are clear and anicteric mucous membranes are moist  Neck soft supple trachea midline  Coarse breath sounds with crackles on the right, mildly prolonged expiratory phase but no wheeze. Arguably a little more clear than yesterday  Regular rhythm normal S1 and S2  Abdomen soft nontender nondistended normal active bowel sounds. Abdominal pannus has substantial edema  Distal extremities, thighs, sacrum have substantial chronic type nonpitting edema, though no pitting edema  Pulses 1+ x4  Moves all 4 extremities  Cranial nerves II through XII grossly intact  Awake, alert, interactive and appropriate  Skin tone consistent with chronic venous stasis distally      DATA:    Recent Labs     01/26/23  0749   WBC 10.6   HGB 9.2*   HCT 34.6   MCV 94.3        Recent Labs     01/26/23  0749 01/27/23  0803 01/28/23  0907    145 147*   K 3.6 3.4* 4.2   CL 99 99 99   CO2 38* 37* 38*   BUN 55* 56* 58*   CREATININE 2.2* 2.3* 2.3*       Lab Results   Component Value Date    LABPROT 0.5 (H) 01/18/2023    LABPROT 0.5 01/18/2023     On CT no hydro/signs of obstruction     Urine studies  U Na 21, U Cl 23 U prot:cr ratio 0.5    ASSESSMENT / RECOMMENDATIONS:       Assessment  1. Acute kidney injury urine lytes support decrease effective volume     2. CKD stage III, Baseline creatinine 1.4 to 1.7 mg/dL, secondary to diabetic nephropathy and renal microvascular atherosclerotic disease  0.5 g proteinuria     3. Anemia due to CKD, phlebotomy associated prolonged hospitalization     4. Acute hypercapnic and hypoxic respiratory failure due to COPD exacerbation and pneumonia. Does not appear hypervolemic. 5.  Morbid obesity, BMI 50.6 kg per metered square     6. Edema/anasarca, chronic, multifactorial, due to venous insufficiency in the setting of morbid obesity, relative immobility, likely diastolic dysfunction though it was \"indeterminate\" on echo, the does have pulmonary hypertension, mild right-sided heart failure    7.   Hypotension     Remains hypervolemic  Creatinine stable    Recommendations  continue bumex drip for now, possibly 1-2 more days  Add diamox bid for few doses   Replace K   Compression stockings   follow labs, UO  Out of bed to chair   Continue supportive care        Electronically signed by Caroline Gusman MD on 1/28/2023

## 2023-01-28 NOTE — PROGRESS NOTES
Pulmonary/Critical Care Progress Note    We are following patient for morbid obesity, severe COPD, obstructive sleep apnea, obesity hypoventilation syndrome, severe obstructive sleep apnea, diabetes mellitus, hyperlipidemia, systemic hypertension, pulmonary hypertension, CKD, and history of breast cancer. SUBJECTIVE:  Patient seen examined at the bedside. She is currently using 4 L of oxygen and denies any shortness of breath. Her oxygen saturations been maintaining at 95 to 96% she was encouraged to use her incentive spirometer and ambulate with physical therapy as much as possible. Her bilateral lower extremities remain swollen and she continues on her Zyvox therapy for his cellulitis.     MEDICATIONS:   [START ON 1/28/2023] acetaZOLAMIDE (DIAMOX) IVPB  500 mg IntraVENous Q12H    [Held by provider] dilTIAZem  60 mg Oral 3 times per day    sucralfate  1 g Oral 4x Daily    rOPINIRole  1 mg Oral TID    atorvastatin  20 mg Oral Nightly    ipratropium-albuterol  1 vial Inhalation 4x Daily    [Held by provider] insulin glargine  13 Units SubCUTAneous BID    [Held by provider] LORazepam  0.5 mg Oral Nightly    metoprolol succinate  25 mg Oral BID    insulin lispro  0-4 Units SubCUTAneous TID WC    insulin lispro  0-4 Units SubCUTAneous Nightly    budesonide  0.5 mg Nebulization BID    sodium chloride flush  10 mL IntraVENous 2 times per day    miconazole   Topical BID    arformoterol tartrate  15 mcg Nebulization BID    pantoprazole  40 mg Oral QAM AC    apixaban  5 mg Oral BID    mirtazapine  7.5 mg Oral Nightly    aspirin  81 mg Oral Daily    carbidopa-levodopa  1 tablet Oral TID    sertraline  50 mg Oral Daily    montelukast  10 mg Oral Nightly    [Held by provider] midodrine  5 mg Oral TID       bumetanide 0.1 mg/mL infusion 0.75 mg/hr (01/27/23 1116)    sodium chloride      dextrose       loperamide, sodium chloride flush, sodium chloride, promethazine **OR** ondansetron, polyethylene glycol, acetaminophen **OR** acetaminophen, glucose, dextrose bolus **OR** dextrose bolus, glucagon (rDNA), dextrose      REVIEW OF SYSTEMS:  Constitutional: Denies fever, weight loss, night sweats, and fatigue  Skin: Denies pigmentation, dark lesions, and rashes   HEENT: Denies hearing loss, tinnitus, ear drainage, epistaxis, sore throat, and hoarseness. Cardiovascular: Denies palpitations, chest pain, and chest pressure. Respiratory: Denies cough, dyspnea at rest, hemoptysis, apnea, and choking.   Gastrointestinal: Denies nausea, vomiting, poor appetite, diarrhea, heartburn or reflux  Genitourinary: Denies dysuria, frequency, urgency or hematuria  Musculoskeletal: Denies myalgias, muscle weakness, and bone pain  Neurological: Denies dizziness, vertigo, headache, and focal weakness  Psychological: Denies anxiety and depression  Endocrine: Denies heat intolerance and cold intolerance  Hematopoietic/Lymphatic: Denies bleeding problems and blood transfusions    OBJECTIVE:  Vitals:    01/27/23 1445   BP: 98/76   Pulse: (!) 111   Resp: 20   Temp: 97.4 °F (36.3 °C)   SpO2: 96%     FiO2 : 40 %  O2 Flow Rate (L/min): 5 L/min  O2 Device: Nasal cannula    PHYSICAL EXAM:  Constitutional: No fever chills diaphoresis  Skin: Warm dry no rash or breakdown  HEENT: Atraumatic, normocephalic, PERRLA, EOMI, mucous membranes moist  Neck: No JVD or thyroidomegaly, large neck circumference  Cardiovascular: S1, S2, regular rate and rhythm, no rubs gallops or murmurs  Respiratory: Diminished bilateral no crackles rhonchi or wheezes  Gastrointestinal: Obese, soft, nontender bowel sounds present in all quadrants  Genitourinary: No CVA tenderness  Extremities: 2-3+ edema in bilateral lower extremities peripheral pulses palpable  Neurological: Alert and oriented x3 no focal deficit  Psychological: Alert and pleasant    LABS:  WBC   Date Value Ref Range Status   01/26/2023 10.6 4.5 - 11.5 E9/L Final   01/22/2023 7.1 4.5 - 11.5 E9/L Final   01/20/2023 8.9 4.5 - 11.5 E9/L Final     Hemoglobin   Date Value Ref Range Status   01/26/2023 9.2 (L) 11.5 - 15.5 g/dL Final   01/22/2023 8.2 (L) 11.5 - 15.5 g/dL Final   01/20/2023 8.0 (L) 11.5 - 15.5 g/dL Final     Hematocrit   Date Value Ref Range Status   01/26/2023 34.6 34.0 - 48.0 % Final   01/22/2023 30.8 (L) 34.0 - 48.0 % Final   01/20/2023 30.1 (L) 34.0 - 48.0 % Final     MCV   Date Value Ref Range Status   01/26/2023 94.3 80.0 - 99.9 fL Final   01/22/2023 93.1 80.0 - 99.9 fL Final   01/20/2023 95.0 80.0 - 99.9 fL Final     Platelets   Date Value Ref Range Status   01/26/2023 169 130 - 450 E9/L Final   01/22/2023 189 130 - 450 E9/L Final   01/20/2023 231 130 - 450 E9/L Final     Sodium   Date Value Ref Range Status   01/27/2023 145 132 - 146 mmol/L Final   01/26/2023 146 132 - 146 mmol/L Final   01/25/2023 146 132 - 146 mmol/L Final     Potassium   Date Value Ref Range Status   01/27/2023 3.4 (L) 3.5 - 5.0 mmol/L Final   01/26/2023 3.6 3.5 - 5.0 mmol/L Final   01/25/2023 4.0 3.5 - 5.0 mmol/L Final     Potassium reflex Magnesium   Date Value Ref Range Status   01/20/2023 4.8 3.5 - 5.0 mmol/L Final   01/19/2023 4.9 3.5 - 5.0 mmol/L Final   01/18/2023 5.2 (H) 3.5 - 5.0 mmol/L Final     Chloride   Date Value Ref Range Status   01/27/2023 99 98 - 107 mmol/L Final   01/26/2023 99 98 - 107 mmol/L Final   01/25/2023 103 98 - 107 mmol/L Final     CO2   Date Value Ref Range Status   01/27/2023 37 (H) 22 - 29 mmol/L Final   01/26/2023 38 (H) 22 - 29 mmol/L Final   01/25/2023 35 (H) 22 - 29 mmol/L Final     BUN   Date Value Ref Range Status   01/27/2023 56 (H) 6 - 23 mg/dL Final   01/26/2023 55 (H) 6 - 23 mg/dL Final   01/25/2023 56 (H) 6 - 23 mg/dL Final     Creatinine   Date Value Ref Range Status   01/27/2023 2.3 (H) 0.5 - 1.0 mg/dL Final   01/26/2023 2.2 (H) 0.5 - 1.0 mg/dL Final   01/25/2023 2.2 (H) 0.5 - 1.0 mg/dL Final     Glucose   Date Value Ref Range Status   01/27/2023 58 (L) 74 - 99 mg/dL Final   01/26/2023 101 (H) 74 - 99 mg/dL Final   01/25/2023 127 (H) 74 - 99 mg/dL Final     Calcium   Date Value Ref Range Status   01/27/2023 8.8 8.6 - 10.2 mg/dL Final   01/26/2023 9.2 8.6 - 10.2 mg/dL Final   01/25/2023 8.9 8.6 - 10.2 mg/dL Final     Total Protein   Date Value Ref Range Status   01/17/2023 6.1 (L) 6.4 - 8.3 g/dL Final   01/16/2023 6.3 (L) 6.4 - 8.3 g/dL Final   12/30/2022 5.8 (L) 6.4 - 8.3 g/dL Final     Albumin   Date Value Ref Range Status   01/17/2023 3.6 3.5 - 5.2 g/dL Final   01/16/2023 3.8 3.5 - 5.2 g/dL Final   12/30/2022 3.6 3.5 - 5.2 g/dL Final     Total Bilirubin   Date Value Ref Range Status   01/17/2023 0.5 0.0 - 1.2 mg/dL Final   01/16/2023 0.4 0.0 - 1.2 mg/dL Final   12/30/2022 0.4 0.0 - 1.2 mg/dL Final     Alkaline Phosphatase   Date Value Ref Range Status   01/17/2023 98 35 - 104 U/L Final   01/16/2023 96 35 - 104 U/L Final   12/30/2022 94 35 - 104 U/L Final     AST   Date Value Ref Range Status   01/17/2023 9 0 - 31 U/L Final   01/16/2023 10 0 - 31 U/L Final   12/30/2022 11 0 - 31 U/L Final     ALT   Date Value Ref Range Status   01/17/2023 <5 0 - 32 U/L Final   01/16/2023 <5 0 - 32 U/L Final   12/30/2022 <5 0 - 32 U/L Final     Est, Glom Filt Rate   Date Value Ref Range Status   01/27/2023 22 >=60 mL/min/1.73 Final     Comment:     Pediatric calculator link  [email or web address]_[email or web address]  Effective Oct 3, 2022  These results are not intended for use in patients  <25years of age. eGFR results are calculated without  a race factor using the 2021 CKD-EPI equation. Careful  clinical correlation is recommended, particularly when  comparing to results calculated using previous equations. The CKD-EPI equation is less accurate in patients with  extremes of muscle mass, extra-renal metabolism of  creatinine, excessive creatinine ingestion, or following  therapy that affects renal tubular secretion.      01/26/2023 23 >=60 mL/min/1.73 Final     Comment:     Pediatric calculator alia [email or web address]. [email or web address]_[email or web address]  Effective Oct 3, 2022  These results are not intended for use in patients  <25years of age. eGFR results are calculated without  a race factor using the 2021 CKD-EPI equation. Careful  clinical correlation is recommended, particularly when  comparing to results calculated using previous equations. The CKD-EPI equation is less accurate in patients with  extremes of muscle mass, extra-renal metabolism of  creatinine, excessive creatinine ingestion, or following  therapy that affects renal tubular secretion. 01/25/2023 23 >=60 mL/min/1.73 Final     Comment:     Pediatric calculator link  [email or web address]. [email or web address]_[email or web address]  Effective Oct 3, 2022  These results are not intended for use in patients  <25years of age. eGFR results are calculated without  a race factor using the 2021 CKD-EPI equation. Careful  clinical correlation is recommended, particularly when  comparing to results calculated using previous equations. The CKD-EPI equation is less accurate in patients with  extremes of muscle mass, extra-renal metabolism of  creatinine, excessive creatinine ingestion, or following  therapy that affects renal tubular secretion. GFR    Date Value Ref Range Status   10/16/2022 >60  Final   10/14/2022 59  Final   10/13/2022 >60  Final     Magnesium   Date Value Ref Range Status   12/30/2022 2.0 1.6 - 2.6 mg/dL Final   10/14/2022 2.1 1.6 - 2.6 mg/dL Final   10/08/2022 1.9 1.6 - 2.6 mg/dL Final     Phosphorus   Date Value Ref Range Status   01/20/2023 4.7 (H) 2.5 - 4.5 mg/dL Final   01/19/2023 4.9 (H) 2.5 - 4.5 mg/dL Final   01/18/2023 5.6 (H) 2.5 - 4.5 mg/dL Final     No results for input(s): PH, PO2, PCO2, HCO3, BE, O2SAT in the last 72 hours. RADIOLOGY:  CT ABDOMEN PELVIS WO CONTRAST Additional Contrast? None   Final Result   No nephroureterolithiasis or hydronephrosis. Splenomegaly. Small volume of perihepatic ascites. At least moderate-sized partially imaged right pleural effusion with   bibasilar atelectasis. Several locules of gas which appear to be within the endometrium at the level   of the uterine fundus, of uncertain etiology or clinical significance. Please correlate clinically. Diffuse anasarca throughout the soft tissues. Cardiomegaly. XR CHEST PORTABLE   Final Result   Stable right pleural effusion. PROBLEM LIST:  Principal Problem:    Acute on chronic respiratory failure with hypoxia and hypercapnia (HCC)  Resolved Problems:    * No resolved hospital problems.  *      IMPRESSION:  Acute on chronic respiratory failure with hypoxemia and hypercapnia  Obstructive sleep apnea  Obesity hypoventilation syndrome  Severe pulmonary hypertension  Morbid obesity  COPD    PLAN:  Continues BiPAP at night and during naps  Titrate supplemental oxygen to maintain SPO2 greater than 90%  Diuretics per nephrology  Continue bronchodilators and inhaled ICS      Electronically signed by RASHARD Pedersen CNP on 1/27/2023 at 7:43 PM

## 2023-01-28 NOTE — PROGRESS NOTES
NAME: Joseph Collins  MR:  [id number]  :   1950  Admit Date:  2023      This is a face to face encounter with [facility] of this note, including Diagnosis,  Interval History, Past Medical/Surgical/Family/Social Histories, ROS, physical exam, and Assessment and Plan were copied and pasted from Previous. Updates have been made where noted and reflect current exam and medical decision making from the DOS of this encounter. CHIEF COMPLAINT     ID following for   Chief Complaint   Patient presents with    Respiratory Distress     Normally on 3L NC, came in on CPAP, given total of 50mcg push dose epi for low BP by EMS     HISTORY OF PRESENT ILLNESS     Joseph Collins is a 67 y.o. female who presents with   Chief Complaint   Patient presents with    Respiratory Distress     Normally on 3L NC, came in on CPAP, given total of 50mcg push dose epi for low BP by EMS     2023 and has  has a past medical history of A-fib ([address] Acute on chronic congestive heart failure ([address] Anxiety, Asthma, CAD (coronary artery disease), Cancer ([address] Chronic kidney disease, COPD exacerbation ([address] Depression, Diabetes mellitus ([address] H/O mammogram, Hx MRSA infection, Hyperlipidemia, Hypertension, Lateral epicondylitis, Morbid obesity ([address] SERENA on CPAP, Oxygen dependent, Parkinson's disease ([address] and Tubal ligation status. Pt seen and examined 23  In bed afebrile  no distress  Has no c/o   Wbc10.6 c2.3    Patient is tolerating medications. No reported adverse drug reactions.   Available labs, imaging studies, microbiologic studies have been reviewed      Assessment & Plan   Pt was admitted with   NSTEMI (non-ST elevated myocardial infarction) ([address] [I21.4]  Atrial fibrillation with rapid ventricular response ([address] [I48.91]  Recurrent right pleural effusion [J90]  Hypotension, unspecified hypotension type [I95.9]  Acute on chronic respiratory failure with hypoxia and hypercapnia ([address] [M78.60, J96.22]    ID following for   Hypothermia better   Vre uti  off atbx  left le cons colonized not infected stable healed  Right pleural effusion with bibasilar atelectasis. Plan:   Stop  Linezolid-    Encourage IS     Monitor labs      Continue wound care      Will see prn    BP (!) 91/58   Pulse 94   Temp 97.9 °F (36.6 °C) (Oral)   Resp 18   Ht 5' (1.524 m)   Wt 277 lb 8 oz (125.9 kg)   SpO2 98%   BMI 54.20 kg/m²   Temp  Av.7 °F (36.5 °C)  Min: 97.4 °F (36.3 °C)  Max: 97.9 °F (36.6 °C)  CONSTITUTIONAL:  no apparent distress in bed   ENT:  Normocephalic, atraumatic,     LUNGS:  No increased work of breathing,  dec  to auscultation bilaterally, on  bipapa  CARDIOVASCULAR:    regular rate and rhythm, normal S1 and S2,     ABDOMEN:  normal bowel sounds, soft, distended, non-tender  MUSCULOSKELETAL:   swelling  BLE. Ue edema   Diaz   NEUROLOGIC:  Awake, alert,      SKIN:   no rashes + bruises  Lines:      Peripheral Intravenous Line:  Peripheral IV 23 Left Antecubital (Active)   Site Assessment Clean, dry & intact 23   Line Status Flushed;Blood return noted;Capped;Normal saline locked 23   Line Care Connections checked and tightened; Chlorhexidine wipes; Line pulled back;Ports disinfected;Cap changed 23   Phlebitis Assessment No symptoms 23   Infiltration Assessment 0 23   Alcohol Cap Used No 23   Dressing Status Clean, dry & intact 23   Dressing Type Transparent 23   Dressing Intervention Other (Comment) 23       Peripheral IV 23 Left;Ventral Forearm (Active)   Site Assessment Clean, dry & intact 23   Line Status Flushed;Brisk blood return; Infusing;Capped 23   Line Care Connections checked and tightened; Chlorhexidine wipes; Line pulled back;Ports disinfected;Cap changed 23   Phlebitis Assessment No symptoms 23   Infiltration Assessment 0 01/20/23 2000   Alcohol Cap Used Yes 01/20/23 2000   Dressing Status Clean, dry & intact 01/20/23 2000   Dressing Type Transparent 01/20/23 2000   Dressing Intervention Other (Comment) 01/20/23 2000     Drain(s):  Urinary Catheter 01/18/23 Diaz-Temperature (Active)   $ Urethral catheter insertion $ Not inserted for procedure 01/18/23 0319   Catheter Indications Need for fluid volume management of the critically ill patient in a critical care setting 01/21/23 0124   Site Assessment Pink;Moist;No urethral drainage 01/21/23 0124   Urine Color Yellow 01/21/23 0124   Urine Appearance Cloudy 01/21/23 0124   Collection Container Standard 01/21/23 0124   Securement Method Securing device (Describe) 01/21/23 0124   Catheter Care Completed Yes 01/20/23 2000   Catheter Best Practices  Drainage tube clipped to bed;Catheter secured to thigh;Tamper seal intact;Bag below bladder;Bag not on floor;Lack of dependent loop in tubing;Drainage bag less than half full 01/21/23 0124   Status Draining;Patent 01/21/23 0124   Output (mL) 250 mL 01/20/23 1400     Microbiology:   No results for input(s): COVID19 in the last 72 hours.  Lab Results   Component Value Date/Time    BC 5 Days no growth 01/17/2023 05:23 PM    BC 5 Days no growth 10/12/2022 04:59 PM    BC 5 Days no growth 10/06/2022 09:47 AM    BLOODCULT2 5 Days no growth 01/17/2023 05:23 PM    BLOODCULT2 5 Days no growth 10/12/2022 04:59 PM    BLOODCULT2 5 Days no growth 10/05/2022 11:19 AM    ORG Enterococcus faecium 01/18/2023 03:26 AM    ORG Staphylococcus epidermidis 01/17/2023 10:10 PM    ORG Escherichia coli 10/05/2022 11:19 AM     CULTURE, RESPIRATORY   Date Value Ref Range Status   07/06/2022 Oral Pharyngeal Shavon absent (A)  Final   07/06/2022 Light growth  Final     WOUND/ABSCESS   Date Value Ref Range Status   01/17/2023 Light growth  Final   07/05/2022 Heavy growth  Final   07/05/2022 Heavy growth  Final     Smear, Respiratory   Date Value Ref Range Status   07/06/2022  Refer to ordered Gram stain for results  Final         Component Value Date/Time    AFBCX No growth after 6 weeks of incubation. 10/18/2022 1106    AFBCX No growth after 6 weeks of incubation. 07/08/2022 1546    FUNGSM No Fungal elements seen 07/08/2022 1546    LABFUNG No growth after 4 weeks of incubation. 07/08/2022 1546     MRSA Culture Only   Date Value Ref Range Status   01/17/2023 Methicillin resistant Staph aureus not isolated  Final     LABS:  Recent Labs     01/26/23  0749   WBC 10.6   RBC 3.67   HGB 9.2*   HCT 34.6   MCV 94.3   MCH 25.1*   MCHC 26.6*   RDW 15.9*      MPV 11.4       Recent Labs     01/26/23  0749 01/27/23  0803 01/28/23  0907    145 147*   K 3.6 3.4* 4.2   CL 99 99 99   CO2 38* 37* 38*   BUN 55* 56* 58*   CREATININE 2.2* 2.3* 2.3*   GLUCOSE 101* 58* 89   CALCIUM 9.2 8.8 8.9       Lab Results   Component Value Date    SEDRATE 6 07/06/2022    SEDRATE 15 10/24/2021    SEDRATE 115 (H) 02/03/2021     Lab Results   Component Value Date    CRP 5.0 (H) 07/06/2022    CRP 0.7 (H) 10/25/2021    CRP 13.5 (H) 02/03/2021     Lab Results   Component Value Date    PROCAL 0.33 (H) 01/18/2023    PROCAL 0.24 (H) 07/13/2022    PROCAL 0.22 (H) 07/06/2022     REVIEW OF SYSTEMS     As stated above in the chief complaint, otherwise negative.   CURRENT MEDICATIONS     Current Facility-Administered Medications:     acetaZOLAMIDE (DIAMOX) 500 mg in sodium chloride 0.9 % 100 mL IVPB, 500 mg, IntraVENous, Q12H, Valentina Kaplan MD, Stopped at 01/28/23 1043    bumetanide (BUMEX) 12.5 mg in sodium chloride 0.9 % 125 mL infusion, 0.75 mg/hr, IntraVENous, Continuous, Valentina Kaplan MD, Last Rate: 7.5 mL/hr at 01/28/23 0525, 0.75 mg/hr at 01/28/23 0525    [Held by provider] dilTIAZem (CARDIZEM) tablet 60 mg, 60 mg, Oral, 3 times per day, Drea Bojorquez MD, 60 mg at 01/20/23 0616    sucralfate (CARAFATE) tablet 1 g, 1 g, Oral, 4x Daily, Edith Ashley MD, 1 g at 01/28/23 1042    rOPINIRole (REQUIP) tablet 1 mg, 1 mg, Oral, TID, Aakash Buckley MD, 1 mg at 01/28/23 1000    atorvastatin (LIPITOR) tablet 20 mg, 20 mg, Oral, Nightly, Aakash Buckley MD, 20 mg at 01/27/23 2223    ipratropium-albuterol (DUONEB) nebulizer solution 3 mL, 1 vial, Inhalation, 4x Daily, Aakash Buckley MD, 3 mL at 01/28/23 1009    [Held by provider] insulin glargine (LANTUS) injection vial 13 Units, 13 Units, SubCUTAneous, BID, Aakash Buckley MD, 13 Units at 01/27/23 0913    loperamide (IMODIUM) capsule 2 mg, 2 mg, Oral, 4x Daily PRN, Aakash Buckley MD    [Held by provider] LORazepam (ATIVAN) tablet 0.5 mg, 0.5 mg, Oral, Nightly, Aakash Buckley MD, 0.5 mg at 01/20/23 2317    metoprolol succinate (TOPROL XL) extended release tablet 25 mg, 25 mg, Oral, BID, Loki Reid MD, 25 mg at 01/28/23 1041    insulin lispro (HUMALOG) injection vial 0-4 Units, 0-4 Units, SubCUTAneous, TID WC, Aakash Buckley MD, 1 Units at 01/25/23 1744    insulin lispro (HUMALOG) injection vial 0-4 Units, 0-4 Units, SubCUTAneous, Nightly, Aakash Buckley MD, 4 Units at 01/18/23 2158    budesonide (PULMICORT) nebulizer suspension 500 mcg, 0.5 mg, Nebulization, BID, Aakash Buckley MD, 500 mcg at 01/28/23 0140    sodium chloride flush 0.9 % injection 10 mL, 10 mL, IntraVENous, 2 times per day, Aakash Buckley MD, 10 mL at 01/28/23 1043    sodium chloride flush 0.9 % injection 10 mL, 10 mL, IntraVENous, PRN, Aakash Buckley MD    0.9 % sodium chloride infusion, , IntraVENous, PRN, Aakash Buckley MD    promethazine (PHENERGAN) tablet 12.5 mg, 12.5 mg, Oral, Q6H PRN **OR** ondansetron (ZOFRAN) injection 4 mg, 4 mg, IntraVENous, Q6H PRN, Aakash Buckley MD    polyethylene glycol (GLYCOLAX) packet 17 g, 17 g, Oral, Daily PRN, Aakash Buckley MD    acetaminophen (TYLENOL) tablet 650 mg, 650 mg, Oral, Q6H PRN, 650 mg at 01/27/23 2223 **OR** acetaminophen (TYLENOL) suppository 650 mg, 650 mg, Rectal, Q6H PRN, Aakash Buckley MD    miconazole (MICOTIN) 2 % powder, , Topical, BID, Leola Prima, APRN - CNP, Given at 01/28/23 1044    arformoterol tartrate (BROVANA) nebulizer solution 15 mcg, 15 mcg, Nebulization, BID, Leola Prima, APRN - CNP, 15 mcg at 01/28/23 8998    pantoprazole (PROTONIX) tablet 40 mg, 40 mg, Oral, QAM AC, Luke Porginski, APRN - CNP, 40 mg at 01/28/23 0528    glucose chewable tablet 16 g, 4 tablet, Oral, PRN, Leola Prima, APRN - CNP    dextrose bolus 10% 125 mL, 125 mL, IntraVENous, PRN **OR** dextrose bolus 10% 250 mL, 250 mL, IntraVENous, PRN, Leola Prima, APRN - CNP    glucagon (rDNA) injection 1 mg, 1 mg, SubCUTAneous, PRN, Leola Prima, APRN - CNP    dextrose 10 % infusion, , IntraVENous, Continuous PRN, Leola Prima, APRN - CNP    apixaban (ELIQUIS) tablet 5 mg, 5 mg, Oral, BID, Leola Prima, APRN - CNP, 5 mg at 01/28/23 1000    mirtazapine (REMERON) tablet 7.5 mg, 7.5 mg, Oral, Nightly, Leola Prima, APRN - CNP, 7.5 mg at 01/27/23 2223    aspirin EC tablet 81 mg, 81 mg, Oral, Daily, Luke Porginski, APRN - CNP, 81 mg at 01/28/23 1042    carbidopa-levodopa (SINEMET CR)  MG per extended release tablet 1 tablet, 1 tablet, Oral, TID, Leola Prima, APRN - CNP, 1 tablet at 01/28/23 1041    sertraline (ZOLOFT) tablet 50 mg, 50 mg, Oral, Daily, Luke Porginski, APRN - CNP, 50 mg at 01/28/23 1041    montelukast (SINGULAIR) tablet 10 mg, 10 mg, Oral, Nightly, Leola Prima, APRN - CNP, 10 mg at 01/27/23 2226    [Held by provider] midodrine (PROAMATINE) tablet 5 mg, 5 mg, Oral, TID WC, Leola Prima, APRN - CNP  DIAGNOSTIC RESULTS   Radiology:  CT ABDOMEN PELVIS WO CONTRAST Additional Contrast? None    Result Date: 1/18/2023  EXAMINATION: CT OF THE ABDOMEN AND PELVIS WITHOUT CONTRAST 1/18/2023 5:47 am TECHNIQUE: CT of the abdomen and pelvis was performed without the administration of intravenous contrast. Multiplanar reformatted images are provided for review.  Automated exposure control, iterative reconstruction, and/or weight based adjustment of the mA/kV was utilized to reduce the radiation dose to as low as reasonably achievable. COMPARISON: October 8, 2022 HISTORY: ORDERING SYSTEM PROVIDED HISTORY: R/O obstructive uropathy TECHNOLOGIST PROVIDED HISTORY: Reason for exam:->R/O obstructive uropathy Additional Contrast?->None FINDINGS: Lower Chest: The heart is enlarged. There is a small pericardial effusion and at least a moderate size right pleural effusion is partially imaged. A trace left pleural effusion is noted. Left lower lobe subsegmental atelectasis is noted. Organs: The gallbladder is surgically absent. The liver has an unremarkable unenhanced appearance. The spleen is enlarged measuring 15.9 cm in craniocaudal dimension. The unenhanced pancreas demonstrates diffuse fatty atrophy. The kidneys are without hydronephrosis or radiopaque calculus. GI/Bowel: No evidence of a bowel obstruction, free air or pneumatosis. Portions of the colon are decompressed, limiting assessment for mucosal based abnormalities. The appendix is not confidently visualized. No obvious pericecal inflammatory changes to suggest acute appendicitis. Pelvis: The urinary bladder is decompressed around a Diaz catheter. A small amount of the intraluminal gas in the urinary bladder may be related to recent instrumentation. A small amount of gas is present in the uterine fundus, apparently within the endometrial canal.  The ovaries are not confidently visualized. There are scattered visible but nonenlarged pelvic lymph nodes. Peritoneum/Retroperitoneum: A small amount of ascites is present, predominantly in a perihepatic distribution. The abdominal aorta is mildly calcified without evidence of aneurysm. No retroperitoneal lymphadenopathy or mass. Bones/Soft Tissues: No acute osseous abnormality or evidence of an aggressive osseous lesion. Anasarca is present throughout the soft tissues. No nephroureterolithiasis or hydronephrosis. Splenomegaly.  Small volume of perihepatic ascites. At least moderate-sized partially imaged right pleural effusion with bibasilar atelectasis. Several locules of gas which appear to be within the endometrium at the level of the uterine fundus, of uncertain etiology or clinical significance. Please correlate clinically. Diffuse anasarca throughout the soft tissues. Cardiomegaly. XR FOOT RIGHT (MIN 3 VIEWS)    Result Date: 1/16/2023  EXAMINATION: THREE XRAY VIEWS OF THE RIGHT FOOT 1/16/2023 12:40 pm COMPARISON: None. HISTORY: ORDERING SYSTEM PROVIDED HISTORY: Evaluate big toe wound TECHNOLOGIST PROVIDED HISTORY: Reason for exam:->Evaluate big toe wound FINDINGS: Soft tissue wound evident at the great toe. No radiographically visible acute osteomyelitis. 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 No radiopaque foreign body or soft tissue emphysema. Plantar heel spur noted. No evident acute osteomyelitis. Great toe soft tissue ulcer. Soft tissue swelling. See above. XR CHEST PORTABLE    Result Date: 1/17/2023  EXAMINATION: ONE XRAY VIEW OF THE CHEST 1/17/2023 5:38 pm COMPARISON: 01/16/2023 HISTORY: ORDERING SYSTEM PROVIDED HISTORY: sob TECHNOLOGIST PROVIDED HISTORY: Reason for exam:->sob FINDINGS: The lungs are without acute focal process. Stable right pleural effusion. No pneumothorax. Stable heart size. The osseous structures are without acute process. Stable right pleural effusion. XR CHEST PORTABLE    Result Date: 1/16/2023  EXAMINATION: ONE XRAY VIEW OF THE CHEST 1/16/2023 8:53 am COMPARISON: 30 December 2022 HISTORY: ORDERING SYSTEM PROVIDED HISTORY: SOB TECHNOLOGIST PROVIDED HISTORY: Reason for exam:->SOB FINDINGS: Stable right pleural effusion with adjacent atelectasis and or infiltrate. Stable cardiomediastinal silhouette. No new abnormal findings. Stable right-sided pleural effusion with adjacent atelectasis and or infiltrate.      XR CHEST 1 VIEW    Result Date: 12/30/2022  EXAMINATION: ONE XRAY VIEW OF THE CHEST 12/30/2022 3:21 pm COMPARISON: 12/19/2022 HISTORY: ORDERING SYSTEM PROVIDED HISTORY: SOB TECHNOLOGIST PROVIDED HISTORY: Reason for exam:->SOB FINDINGS: The heart is enlarged. There is a right pleural effusion right lung base atelectasis. I cannot exclude partial collapse of the right lower lobe. The left lung is clear. There is no pneumothorax. 1. Right pleural effusion right lung base atelectasis. 2. I cannot exclude partial collapse of the right lower lobe 3. Cardiomegaly      Imaging and labs were reviewed per medical records. Thank you for involving me in the care of Denver Deist I will continue to follow. Please do not hesitate to call [phone] for any questions or concerns.     Electronically signed by Bill Pineda MD on 1/28/2023 at 11:57 AM

## 2023-01-28 NOTE — PROGRESS NOTES
Department of Internal Medicine  General Internal Medicine  Attending Progress Note  Chief Complaint   Patient presents with    Respiratory Distress     Normally on 3L NC, came in on CPAP, given total of 50mcg push dose epi for low BP by EMS     SUBJECTIVE:    Patient was sleepy at the time of evaluation. She is able to wake up, but falls asleep immediately. She denied fever and chills. No chest pain. Son in the room and I discussed treatment plans and need for diuresis.     OBJECTIVE      Medications    Current Facility-Administered Medications: acetaZOLAMIDE (DIAMOX) 500 mg in sodium chloride 0.9 % 100 mL IVPB, 500 mg, IntraVENous, Q12H  bumetanide (BUMEX) 12.5 mg in sodium chloride 0.9 % 125 mL infusion, 0.75 mg/hr, IntraVENous, Continuous  [Held by provider] dilTIAZem (CARDIZEM) tablet 60 mg, 60 mg, Oral, 3 times per day  sucralfate (CARAFATE) tablet 1 g, 1 g, Oral, 4x Daily  rOPINIRole (REQUIP) tablet 1 mg, 1 mg, Oral, TID  atorvastatin (LIPITOR) tablet 20 mg, 20 mg, Oral, Nightly  ipratropium-albuterol (DUONEB) nebulizer solution 3 mL, 1 vial, Inhalation, 4x Daily  [Held by provider] insulin glargine (LANTUS) injection vial 13 Units, 13 Units, SubCUTAneous, BID  loperamide (IMODIUM) capsule 2 mg, 2 mg, Oral, 4x Daily PRN  [Held by provider] LORazepam (ATIVAN) tablet 0.5 mg, 0.5 mg, Oral, Nightly  metoprolol succinate (TOPROL XL) extended release tablet 25 mg, 25 mg, Oral, BID  insulin lispro (HUMALOG) injection vial 0-4 Units, 0-4 Units, SubCUTAneous, TID WC  insulin lispro (HUMALOG) injection vial 0-4 Units, 0-4 Units, SubCUTAneous, Nightly  budesonide (PULMICORT) nebulizer suspension 500 mcg, 0.5 mg, Nebulization, BID  sodium chloride flush 0.9 % injection 10 mL, 10 mL, IntraVENous, 2 times per day  sodium chloride flush 0.9 % injection 10 mL, 10 mL, IntraVENous, PRN  0.9 % sodium chloride infusion, , IntraVENous, PRN  promethazine (PHENERGAN) tablet 12.5 mg, 12.5 mg, Oral, Q6H PRN **OR** ondansetron (ZOFRAN) injection 4 mg, 4 mg, IntraVENous, Q6H PRN  polyethylene glycol (GLYCOLAX) packet 17 g, 17 g, Oral, Daily PRN  acetaminophen (TYLENOL) tablet 650 mg, 650 mg, Oral, Q6H PRN **OR** acetaminophen (TYLENOL) suppository 650 mg, 650 mg, Rectal, Q6H PRN  miconazole (MICOTIN) 2 % powder, , Topical, BID  arformoterol tartrate (BROVANA) nebulizer solution 15 mcg, 15 mcg, Nebulization, BID  pantoprazole (PROTONIX) tablet 40 mg, 40 mg, Oral, QAM AC  glucose chewable tablet 16 g, 4 tablet, Oral, PRN  dextrose bolus 10% 125 mL, 125 mL, IntraVENous, PRN **OR** dextrose bolus 10% 250 mL, 250 mL, IntraVENous, PRN  glucagon (rDNA) injection 1 mg, 1 mg, SubCUTAneous, PRN  dextrose 10 % infusion, , IntraVENous, Continuous PRN  apixaban (ELIQUIS) tablet 5 mg, 5 mg, Oral, BID  mirtazapine (REMERON) tablet 7.5 mg, 7.5 mg, Oral, Nightly  aspirin EC tablet 81 mg, 81 mg, Oral, Daily  carbidopa-levodopa (SINEMET CR)  MG per extended release tablet 1 tablet, 1 tablet, Oral, TID  sertraline (ZOLOFT) tablet 50 mg, 50 mg, Oral, Daily  montelukast (SINGULAIR) tablet 10 mg, 10 mg, Oral, Nightly  [Held by provider] midodrine (PROAMATINE) tablet 5 mg, 5 mg, Oral, TID WC  Physical    VITALS:  BP (!) 91/58   Pulse 94   Temp 97.9 °F (36.6 °C) (Oral)   Resp 18   Ht 5' (1.524 m)   Wt 277 lb 8 oz (125.9 kg)   SpO2 98%   BMI 54.20 kg/m²   CONSTITUTIONAL:  awake, cooperative, and no apparent distress  EYES:  extra-ocular muscles intact  ENT:  normocepalic, without obvious abnormality  NECK:  supple, symmetrical, trachea midline  LUNGS:  no increased work of breathing, no retractions, and clear to auscultation  CARDIOVASCULAR:  normal apical pulses and normal S1 and S2  ABDOMEN:  normal bowel sounds, non-distended, and non-tender  MUSCULOSKELETAL:  anasarca  NEUROLOGIC:  Mental Status Exam:  Level of Alertness:   awake  Orientation:   person, place, time  SKIN:  no bruising or bleeding  Data    CBC:   Lab Results   Component Value Date/Time WBC 10.6 01/26/2023 07:49 AM    RBC 3.67 01/26/2023 07:49 AM    HGB 9.2 01/26/2023 07:49 AM    HCT 34.6 01/26/2023 07:49 AM    MCV 94.3 01/26/2023 07:49 AM    MCH 25.1 01/26/2023 07:49 AM    MCHC 26.6 01/26/2023 07:49 AM    RDW 15.9 01/26/2023 07:49 AM     01/26/2023 07:49 AM    MPV 11.4 01/26/2023 07:49 AM     BMP:    Lab Results   Component Value Date/Time     01/28/2023 09:07 AM    K 4.2 01/28/2023 09:07 AM    K 4.8 01/20/2023 05:07 AM    CL 99 01/28/2023 09:07 AM    CO2 38 01/28/2023 09:07 AM    BUN 58 01/28/2023 09:07 AM    LABALBU 3.6 01/17/2023 05:23 PM    CREATININE 2.3 01/28/2023 09:07 AM    CALCIUM 8.9 01/28/2023 09:07 AM    GFRAA >60 10/16/2022 09:20 AM    LABGLOM 22 01/28/2023 09:07 AM    GLUCOSE 89 01/28/2023 09:07 AM     ABG:    Lab Results   Component Value Date/Time    PH 7.271 01/28/2023 02:32 PM    PCO2 95.5 01/28/2023 02:32 PM    PO2 67.3 01/28/2023 02:32 PM    HCO3 43.0 01/28/2023 02:32 PM    BE 13.1 01/28/2023 02:32 PM    O2SAT 91.8 01/28/2023 02:32 PM       ASSESSMENT AND PLAN      Acute on chronic respiratory failure with hypoxia and hypercapnia:  ABG showed severe hypercapnia. Discussed with respiratory, patient to be placed in bipap. Continue to monitor  CHF with systolic dysfunction: still severely fluid overloaded. On Bumex drip. Continue to monitor I and O.  CKD stage 3: continue to monitor  Hypertension Essential: noted episode of hypotension. Will give albumin  DM type 2 complicated with Nephropathy: continue to hold Insulin  COPD: not in exacerbation. Continue to monitor. Morbid obesity: lifestyle modification. SERENA: continue Bipap.

## 2023-01-29 LAB
ANION GAP SERPL CALCULATED.3IONS-SCNC: 7 MMOL/L (ref 7–16)
BUN BLDV-MCNC: 61 MG/DL (ref 6–23)
CALCIUM SERPL-MCNC: 8.3 MG/DL (ref 8.6–10.2)
CHLORIDE BLD-SCNC: 98 MMOL/L (ref 98–107)
CO2: 40 MMOL/L (ref 22–29)
CREAT SERPL-MCNC: 2.3 MG/DL (ref 0.5–1)
GFR SERPL CREATININE-BSD FRML MDRD: 22 ML/MIN/1.73
GLUCOSE BLD-MCNC: 99 MG/DL (ref 74–99)
MAGNESIUM: 1.7 MG/DL (ref 1.6–2.6)
METER GLUCOSE: 150 MG/DL (ref 74–99)
METER GLUCOSE: 159 MG/DL (ref 74–99)
METER GLUCOSE: 169 MG/DL (ref 74–99)
METER GLUCOSE: 193 MG/DL (ref 74–99)
METER GLUCOSE: 97 MG/DL (ref 74–99)
PHOSPHORUS: 4.8 MG/DL (ref 2.5–4.5)
POTASSIUM SERPL-SCNC: 3.3 MMOL/L (ref 3.5–5)
SODIUM BLD-SCNC: 145 MMOL/L (ref 132–146)

## 2023-01-29 PROCEDURE — 94660 CPAP INITIATION&MGMT: CPT

## 2023-01-29 PROCEDURE — 80048 BASIC METABOLIC PNL TOTAL CA: CPT

## 2023-01-29 PROCEDURE — 82962 GLUCOSE BLOOD TEST: CPT

## 2023-01-29 PROCEDURE — 2580000003 HC RX 258: Performed by: INTERNAL MEDICINE

## 2023-01-29 PROCEDURE — 97535 SELF CARE MNGMENT TRAINING: CPT | Performed by: OCCUPATIONAL THERAPIST

## 2023-01-29 PROCEDURE — 6360000002 HC RX W HCPCS: Performed by: NURSE PRACTITIONER

## 2023-01-29 PROCEDURE — 94640 AIRWAY INHALATION TREATMENT: CPT

## 2023-01-29 PROCEDURE — 6370000000 HC RX 637 (ALT 250 FOR IP): Performed by: INTERNAL MEDICINE

## 2023-01-29 PROCEDURE — 99232 SBSQ HOSP IP/OBS MODERATE 35: CPT | Performed by: INTERNAL MEDICINE

## 2023-01-29 PROCEDURE — 84100 ASSAY OF PHOSPHORUS: CPT

## 2023-01-29 PROCEDURE — 6370000000 HC RX 637 (ALT 250 FOR IP): Performed by: NURSE PRACTITIONER

## 2023-01-29 PROCEDURE — 2060000000 HC ICU INTERMEDIATE R&B

## 2023-01-29 PROCEDURE — 2700000000 HC OXYGEN THERAPY PER DAY

## 2023-01-29 PROCEDURE — 83735 ASSAY OF MAGNESIUM: CPT

## 2023-01-29 PROCEDURE — 36415 COLL VENOUS BLD VENIPUNCTURE: CPT

## 2023-01-29 PROCEDURE — 97110 THERAPEUTIC EXERCISES: CPT | Performed by: OCCUPATIONAL THERAPIST

## 2023-01-29 RX ORDER — POTASSIUM CHLORIDE 20 MEQ/1
20 TABLET, EXTENDED RELEASE ORAL EVERY 8 HOURS
Status: COMPLETED | OUTPATIENT
Start: 2023-01-29 | End: 2023-01-29

## 2023-01-29 RX ORDER — 0.9 % SODIUM CHLORIDE 0.9 %
500 INTRAVENOUS SOLUTION INTRAVENOUS ONCE
Status: COMPLETED | OUTPATIENT
Start: 2023-01-29 | End: 2023-01-29

## 2023-01-29 RX ADMIN — IPRATROPIUM BROMIDE AND ALBUTEROL SULFATE 3 ML: .5; 2.5 SOLUTION RESPIRATORY (INHALATION) at 17:06

## 2023-01-29 RX ADMIN — APIXABAN 5 MG: 5 TABLET, FILM COATED ORAL at 20:45

## 2023-01-29 RX ADMIN — BUDESONIDE 500 MCG: 0.5 SUSPENSION RESPIRATORY (INHALATION) at 06:00

## 2023-01-29 RX ADMIN — ATORVASTATIN CALCIUM 20 MG: 20 TABLET, FILM COATED ORAL at 20:45

## 2023-01-29 RX ADMIN — METOPROLOL SUCCINATE 25 MG: 25 TABLET, FILM COATED, EXTENDED RELEASE ORAL at 07:57

## 2023-01-29 RX ADMIN — SERTRALINE 50 MG: 50 TABLET, FILM COATED ORAL at 07:57

## 2023-01-29 RX ADMIN — ROPINIROLE HYDROCHLORIDE 1 MG: 1 TABLET, FILM COATED ORAL at 20:45

## 2023-01-29 RX ADMIN — SUCRALFATE 1 G: 1 TABLET ORAL at 20:45

## 2023-01-29 RX ADMIN — IPRATROPIUM BROMIDE AND ALBUTEROL SULFATE 3 ML: .5; 2.5 SOLUTION RESPIRATORY (INHALATION) at 09:06

## 2023-01-29 RX ADMIN — ANTI-FUNGAL POWDER MICONAZOLE NITRATE TALC FREE: 1.42 POWDER TOPICAL at 07:56

## 2023-01-29 RX ADMIN — ARFORMOTEROL TARTRATE 15 MCG: 15 SOLUTION RESPIRATORY (INHALATION) at 06:00

## 2023-01-29 RX ADMIN — ASPIRIN 81 MG: 81 TABLET, COATED ORAL at 07:57

## 2023-01-29 RX ADMIN — Medication 10 ML: at 20:46

## 2023-01-29 RX ADMIN — ANTI-FUNGAL POWDER MICONAZOLE NITRATE TALC FREE: 1.42 POWDER TOPICAL at 20:44

## 2023-01-29 RX ADMIN — IPRATROPIUM BROMIDE AND ALBUTEROL SULFATE 3 ML: .5; 2.5 SOLUTION RESPIRATORY (INHALATION) at 14:30

## 2023-01-29 RX ADMIN — BUDESONIDE 500 MCG: 0.5 SUSPENSION RESPIRATORY (INHALATION) at 17:06

## 2023-01-29 RX ADMIN — CARBIDOPA AND LEVODOPA 1 TABLET: 50; 200 TABLET, EXTENDED RELEASE ORAL at 07:57

## 2023-01-29 RX ADMIN — MONTELUKAST SODIUM 10 MG: 10 TABLET, FILM COATED ORAL at 20:45

## 2023-01-29 RX ADMIN — ACETAMINOPHEN 650 MG: 325 TABLET ORAL at 20:45

## 2023-01-29 RX ADMIN — ROPINIROLE HYDROCHLORIDE 1 MG: 1 TABLET, FILM COATED ORAL at 07:57

## 2023-01-29 RX ADMIN — SODIUM CHLORIDE 500 ML: 9 INJECTION, SOLUTION INTRAVENOUS at 00:30

## 2023-01-29 RX ADMIN — POTASSIUM CHLORIDE 20 MEQ: 1500 TABLET, EXTENDED RELEASE ORAL at 20:45

## 2023-01-29 RX ADMIN — MIRTAZAPINE 7.5 MG: 15 TABLET, FILM COATED ORAL at 20:46

## 2023-01-29 RX ADMIN — CARBIDOPA AND LEVODOPA 1 TABLET: 50; 200 TABLET, EXTENDED RELEASE ORAL at 14:53

## 2023-01-29 RX ADMIN — SUCRALFATE 1 G: 1 TABLET ORAL at 07:57

## 2023-01-29 RX ADMIN — IPRATROPIUM BROMIDE AND ALBUTEROL SULFATE 3 ML: .5; 2.5 SOLUTION RESPIRATORY (INHALATION) at 06:00

## 2023-01-29 RX ADMIN — POTASSIUM CHLORIDE 20 MEQ: 1500 TABLET, EXTENDED RELEASE ORAL at 11:37

## 2023-01-29 RX ADMIN — CARBIDOPA AND LEVODOPA 1 TABLET: 50; 200 TABLET, EXTENDED RELEASE ORAL at 20:45

## 2023-01-29 RX ADMIN — APIXABAN 5 MG: 5 TABLET, FILM COATED ORAL at 07:57

## 2023-01-29 RX ADMIN — SUCRALFATE 1 G: 1 TABLET ORAL at 11:37

## 2023-01-29 RX ADMIN — ROPINIROLE HYDROCHLORIDE 1 MG: 1 TABLET, FILM COATED ORAL at 14:53

## 2023-01-29 RX ADMIN — ARFORMOTEROL TARTRATE 15 MCG: 15 SOLUTION RESPIRATORY (INHALATION) at 17:06

## 2023-01-29 RX ADMIN — Medication 10 ML: at 07:57

## 2023-01-29 RX ADMIN — SALINE NASAL SPRAY 1 SPRAY: 1.5 SOLUTION NASAL at 20:51

## 2023-01-29 RX ADMIN — SUCRALFATE 1 G: 1 TABLET ORAL at 17:09

## 2023-01-29 ASSESSMENT — PAIN SCALES - GENERAL
PAINLEVEL_OUTOF10: 1
PAINLEVEL_OUTOF10: 0
PAINLEVEL_OUTOF10: 0

## 2023-01-29 ASSESSMENT — PAIN DESCRIPTION - LOCATION: LOCATION: GENERALIZED

## 2023-01-29 NOTE — PROGRESS NOTES
Department of Internal Medicine  General Internal Medicine  Attending Progress Note  Chief Complaint   Patient presents with    Respiratory Distress     Normally on 3L NC, came in on CPAP, given total of 50mcg push dose epi for low BP by EMS     SUBJECTIVE:    Patient still sleepy. She denied fever and chills. No chest pain. No diarrhea.      OBJECTIVE      Medications    Current Facility-Administered Medications: potassium chloride (KLOR-CON M) extended release tablet 20 mEq, 20 mEq, Oral, q8h  [Held by provider] dilTIAZem (CARDIZEM) tablet 60 mg, 60 mg, Oral, 3 times per day  sucralfate (CARAFATE) tablet 1 g, 1 g, Oral, 4x Daily  rOPINIRole (REQUIP) tablet 1 mg, 1 mg, Oral, TID  atorvastatin (LIPITOR) tablet 20 mg, 20 mg, Oral, Nightly  ipratropium-albuterol (DUONEB) nebulizer solution 3 mL, 1 vial, Inhalation, 4x Daily  [Held by provider] insulin glargine (LANTUS) injection vial 13 Units, 13 Units, SubCUTAneous, BID  loperamide (IMODIUM) capsule 2 mg, 2 mg, Oral, 4x Daily PRN  [Held by provider] LORazepam (ATIVAN) tablet 0.5 mg, 0.5 mg, Oral, Nightly  metoprolol succinate (TOPROL XL) extended release tablet 25 mg, 25 mg, Oral, BID  insulin lispro (HUMALOG) injection vial 0-4 Units, 0-4 Units, SubCUTAneous, TID WC  insulin lispro (HUMALOG) injection vial 0-4 Units, 0-4 Units, SubCUTAneous, Nightly  budesonide (PULMICORT) nebulizer suspension 500 mcg, 0.5 mg, Nebulization, BID  sodium chloride flush 0.9 % injection 10 mL, 10 mL, IntraVENous, 2 times per day  sodium chloride flush 0.9 % injection 10 mL, 10 mL, IntraVENous, PRN  0.9 % sodium chloride infusion, , IntraVENous, PRN  promethazine (PHENERGAN) tablet 12.5 mg, 12.5 mg, Oral, Q6H PRN **OR** ondansetron (ZOFRAN) injection 4 mg, 4 mg, IntraVENous, Q6H PRN  polyethylene glycol (GLYCOLAX) packet 17 g, 17 g, Oral, Daily PRN  acetaminophen (TYLENOL) tablet 650 mg, 650 mg, Oral, Q6H PRN **OR** acetaminophen (TYLENOL) suppository 650 mg, 650 mg, Rectal, Q6H PRN  miconazole (MICOTIN) 2 % powder, , Topical, BID  arformoterol tartrate (BROVANA) nebulizer solution 15 mcg, 15 mcg, Nebulization, BID  pantoprazole (PROTONIX) tablet 40 mg, 40 mg, Oral, QAM AC  glucose chewable tablet 16 g, 4 tablet, Oral, PRN  dextrose bolus 10% 125 mL, 125 mL, IntraVENous, PRN **OR** dextrose bolus 10% 250 mL, 250 mL, IntraVENous, PRN  glucagon (rDNA) injection 1 mg, 1 mg, SubCUTAneous, PRN  dextrose 10 % infusion, , IntraVENous, Continuous PRN  apixaban (ELIQUIS) tablet 5 mg, 5 mg, Oral, BID  mirtazapine (REMERON) tablet 7.5 mg, 7.5 mg, Oral, Nightly  aspirin EC tablet 81 mg, 81 mg, Oral, Daily  carbidopa-levodopa (SINEMET CR)  MG per extended release tablet 1 tablet, 1 tablet, Oral, TID  sertraline (ZOLOFT) tablet 50 mg, 50 mg, Oral, Daily  montelukast (SINGULAIR) tablet 10 mg, 10 mg, Oral, Nightly  [Held by provider] midodrine (PROAMATINE) tablet 5 mg, 5 mg, Oral, TID WC  Physical    VITALS:  /63   Pulse (!) 104   Temp 98.2 °F (36.8 °C) (Oral)   Resp 21   Ht 5' (1.524 m)   Wt 271 lb (122.9 kg)   SpO2 99%   BMI 52.93 kg/m²   CONSTITUTIONAL:  awake, cooperative, and no apparent distress  EYES:  extra-ocular muscles intact and vision intact  ENT:  normocepalic, without obvious abnormality  NECK:  supple, symmetrical, trachea midline  LUNGS:  no increased work of breathing, no retractions, and clear to auscultation  CARDIOVASCULAR:  normal apical pulses and normal S1 and S2  ABDOMEN:  normal bowel sounds, non-distended, and non-tender  MUSCULOSKELETAL:  bilateral lower extremity edema  NEUROLOGIC:  Mental Status Exam:  Level of Alertness:   awake  Orientation:   person, place, time  SKIN:  no bruising or bleeding  Data    CBC:   Lab Results   Component Value Date/Time    WBC 10.6 01/26/2023 07:49 AM    RBC 3.67 01/26/2023 07:49 AM    HGB 9.2 01/26/2023 07:49 AM    HCT 34.6 01/26/2023 07:49 AM    MCV 94.3 01/26/2023 07:49 AM    MCH 25.1 01/26/2023 07:49 AM    MCHC 26.6 01/26/2023 07:49 AM    RDW 15.9 01/26/2023 07:49 AM     01/26/2023 07:49 AM    MPV 11.4 01/26/2023 07:49 AM     BMP:    Lab Results   Component Value Date/Time     01/29/2023 08:17 AM    K 3.3 01/29/2023 08:17 AM    K 4.8 01/20/2023 05:07 AM    CL 98 01/29/2023 08:17 AM    CO2 40 01/29/2023 08:17 AM    BUN 61 01/29/2023 08:17 AM    LABALBU 3.6 01/17/2023 05:23 PM    CREATININE 2.3 01/29/2023 08:17 AM    CALCIUM 8.3 01/29/2023 08:17 AM    GFRAA >60 10/16/2022 09:20 AM    LABGLOM 22 01/29/2023 08:17 AM    GLUCOSE 99 01/29/2023 08:17 AM       ASSESSMENT AND PLAN      Summary of stay: This is a 67 y.o. female  has a past medical history of A-fib ([facility] [address] Acute on chronic congestive heart failure ([address] Anxiety, Asthma, CAD (coronary artery disease), Cancer ([address] Chronic kidney disease, COPD exacerbation ([address] Depression, Diabetes mellitus ([address] H/O mammogram, Hx MRSA infection, Hyperlipidemia, Hypertension, Lateral epicondylitis, Morbid obesity ([address] SERENA on CPAP, Oxygen dependent, Parkinson's disease ([address] and Tubal ligation status. presented with SOB, hypotension  for last few days prior to arrival to the hospital. SOB is associated with some cough, nonproductive but no fever or chills reported. Initially SOB was mild, intermittent but gradually getting more persistent. Started gradually. Exacerbated by general activity or exertion. Relieved only partially by rest. In ED patient was found to be hypotensive with Afib RVR and respiratory failure and was placed on BIPAP. BP improved after midodrine dose. The patient was seen and examined at bedside, appears alert and awake with no acute distress and is able to answer simple  questions.  On direct questioning, patient denied any  resting ongoing chest pain, hemoptysis, productive cough, fever, ongoing palpitation, active abdominal pain, hematemesis, rectal bleeding, blessing, hematuria, any other  and GI complaints, and any new focal neuro deficits Acute on chronic respiratory failure with hypoxia and hypercapnia  Congestive heart failure: decompensated  Atrial Fibrillation  UTI\"  Acute on chronic Kidney disease:  Dm type 2: Insulin held due to Hypoglycemia  Parkinson disease:    Pulm Htn:    Plans:  Bumex Drip has been stopped. This is managed by Nephro. Renal function is stable. Pulm following. Continue to monitor  On eliquis  Continue CPAP and wean off as recommended by pulm.

## 2023-01-29 NOTE — PROGRESS NOTES
Occupational Therapy  OCCUPATIONAL THERAPY Treatment note  [address] [facility] [place], operated by [facility]  [facility]    Date:2023                                                   Patient Name: Joseph Collins     MRN: [id number]     : 1950     Room: [address] [place] [address] [place], OTR/L; [id number]        Referring Provider and Orders/Date:    OT eval and treat  Start:  23,   End:  23,   ONE TIME,   Standing Count:  1 Occurrences,   Richa Salcido MD         Diagnosis:   1. Atrial fibrillation with rapid ventricular response ([address]    2. Acute on chronic respiratory failure with hypoxia and hypercapnia (HCC)    3. Recurrent right pleural effusion    4. NSTEMI (non-ST elevated myocardial infarction) ([address]    5.  Hypotension, unspecified hypotension type          Surgery: none      Pertinent Medical History:             Past Medical History:   Diagnosis Date    A-fib ([address]      Acute on chronic congestive heart failure (HCC)      Anxiety      Asthma      CAD (coronary artery disease) 2016    Cancer ([address]  breast ca 2006     right lumpectomy    Chronic kidney disease       nephrolithiasis    COPD exacerbation ([address] 10/12/2022    Depression      Diabetes mellitus ([address]      H/O mammogram      Hx MRSA infection       toe infection 2012    Hyperlipidemia      Hypertension      Lateral epicondylitis      Morbid obesity (HCC)      SERENA on CPAP      Oxygen dependent      Parkinson's disease ([address]      Tubal ligation status                  Past Surgical History:   Procedure Laterality Date    BREAST LUMPECTOMY        BREAST REDUCTION SURGERY        CARDIAC CATHETERIZATION   2014     Dr. Gunner Darling   2022     Dr. Brittany Howard   7/29/15    CT GUIDED CHEST TUBE   2022     CT GUIDED CHEST TUBE 2022 MD HALLEY Schmidt CT    ENDOSCOPY, COLON, DIAGNOSTIC   7/19/15    GALLBLADDER SURGERY        LUMBAR LAMINECTOMY        TOE AMPUTATION        TONSILLECTOMY        UPPER GASTROINTESTINAL ENDOSCOPY        UPPER GASTROINTESTINAL ENDOSCOPY N/A 7/19/2022     EGD ESOPHAGOGASTRODUODENOSCOPY performed by Ricardo Boucher MD at [facility] ENDOSCOPY        Precautions:  Fall Risk, bipap vs 6L, genao, contact for VRE    Recommended placement: subacute    Assessment of current deficits     [x] Functional mobility           [x]ADLs           [x] Strength                  []Cognition     [x] Functional transfers         [x] IADLs         [x] Safety Awareness   [x]Endurance     [] Fine Coordination                        [x] Balance      [] Vision/perception   []Sensation       [x]Gross Motor Coordination            [] ROM           [] Delirium                   [] Motor Control      OT PLAN OF CARE   OT POC based on physician orders, patient diagnosis and results of clinical assessment     Frequency/Duration 1-3 days/wk for 2 weeks PRN   Specific OT Treatment Interventions to include:   * Instruction/training on adapted ADL techniques and AE recommendations to increase functional independence within precautions       * Training on energy conservation strategies, correct breathing pattern and techniques to improve independence/tolerance for self-care routine  * Functional transfer/mobility training/DME recommendations for increased independence, safety, and fall prevention  * Patient/Family education to increase follow through with safety techniques and functional independence  * Recommendation of environmental modifications for increased safety with functional transfers/mobility and ADLs  * Therapeutic exercise to improve motor endurance, ROM, and functional strength for ADLs/functional transfers  * Therapeutic activities to facilitate/challenge dynamic balance, stand tolerance for increased safety and independence with ADLs  * Therapeutic activities to facilitate gross/fine motor skills for increased independence with ADLs  * Neuro-muscular re-education: facilitation of righting/equilibrium reactions, midline orientation, scapular stability/mobility, normalization of muscle tone, and facilitation of volitional active controled movement  * Positioning to improve skin integrity, interaction with environment and functional independence      Recommended Adaptive Equipment/DME:  TBD       Home Living: Pt is a long term resident of a SNF and plans to return at DC. Bathroom setup: roll in shower chair; grab bars by the toilet and 3:1 over top for safety. DME owned: wc and walker      Prior Level of Function: assist with ADLs , dep with IADLs; ambulated with wc mostly, but transferring with ww and assist   Driving: no   Occupation: none   Enjoys: reading, socializing, phone-games     Pain Level: none  Cognition: A&O: 3/4 not situation; Follows 2 step directions-limited with O2 and bipap              Memory:  fair              Sequencing:  fair              Problem solving:  fair-              Judgement/safety:  fair-    AM-[facility] Daily Activity Inpatient   How much help for putting on and taking off regular lower body clothing?: Total  How much help for Bathing?: A Lot  How much help for Toileting?: Total  How much help for putting on and taking off regular upper body clothing?: A Lot  How much help for taking care of personal grooming?: A Little  How much help for eating meals?: A Little  AM-[facility] Inpatient Daily Activity Raw Score: 12  AM-[facility] Inpatient ADL T-Scale Score : 30.6  ADL Inpatient CMS 0-100% Score: 66.57  ADL Inpatient CMS G-Code Modifier : CL    Functional Assessment:      Initial Eval Status  Date: 1/20/2023   Treatment Status  Date: 1/29/23 STGs = LTGs  Time frame: 10-14 days   Feeding Minimal Assist with modified diet, O2 limitations and fatigue. set up only with breakfast and limited intake due to fatigue. Set up   Grooming Moderate Assist with limitations due to bipap. Min A overall with face/hand wash prior to donning the bipap. Stand by Assist    UB Dressing Maximal Assist with gown management from supine Mod A with line management from supine level. Moderate Assist    LB Dressing Dependent with socks and heel protectors from supine Dep with socks from supine. Maximal Assist    Bathing Maximal Assist from supine level NT due to pt fatigue and 84% on arrival.   Moderate Assist    Toileting Dependent with genao management Dep from supine level with genao; Pt educated pt on use of bed pan rather than just incontinence in supine. Reported understanding. Maximal Assist    Bed Mobility  Pt had just returned to supine after sitting EOB with PT-reported max A with dropping vitals. Body mobility and positioning with max A for comfort and upright posture Pt with low O2 on arrival and placed on bipap. Requesting to remain in supine due to fatigue. Supine to sit: Minimal Assist   Sit to supine: Minimal Assist    Functional Transfers NT due to pt overall debility, decreased activity tolerance, balance deficits, safety and fall risk. Dropping O2 with supine tasks Nt due to pt declining  Moderate Assist    Functional Mobility Not appropriate at time of eval. To re-assess at a later time if appropriate. NT Not appropriate at time of eval. To re-assess at a later time if appropriate. Balance Sitting:     Static:  NT    Dynamic:NT  Standing: NT  Pt refusal Sitting:     Static:  fair    Dynamic:fair-  Standing: poor+   Activity Tolerance Vitals with activity: cont bipap 88-90%; 151/84 HR 67; 129/70 with ADLs, 88% HR 70; poor tolerance to therapy overall with pt very fatigued and falling asleep. Unable to tolerate sitting with OT session. 84% on 6L on arrival. Nursing present and donned bipap for 97% and ; with with poor+ motivation and tolerance to OT session. Declining out of bed. O2 did drop with exercise to high 80's.     Increase sitting tolerance for >15min with stable vital signs for carry over into toileting, functional tranfers and indep in ADLs   Visual/  Perceptual Glasses: not Present; WFL     Reports change in vision since admission: No      NA      Hand Dominance  [x] Right   [] Left     AROM (PROM) Strength Additional Info:  Goal:   RUE  WFL 4-/5 good  and fair FMC/dexterity noted during ADL tasks-tremor  Opposition [x] Intact [] Impaired  Finger to nose [x] Intact [] Impaired 4+/5MMT generally for carry over into self care, functional transfers and functional mobility with AD. LUE WFL 4-/5 good  and fair FMC/dexterity noted during ADL tasks-tremor  Opposition [x] Intact [] Impaired  Finger to nose [x] Intact [] Impaired 5/5MMT generally for carry over into self care, functional transfers and functional mobility with AD. Hearing: WFL   Sensation:  No c/o numbness or tingling   Tone: WFL   Edema: right UE and funmi LE                    Comments: Upon arrival patient supine in bed with low O2 readings. Pt required mod A for most UB ADLs and dep A LB ADLs tasks. Pt very fatigued and declining out of bed. The biggest barriers reflect that of functional transfers, functional mobility, UB/LB ADLs, cognition, activity tolerance, balance, safety and strengthening. At end of session, patient supine with call light and phone within reach, all lines and tubes intact. Overall patient demonstrated decreased independence and safety during completion of ADL/functional transfer/mobility tasks. Nursing updated on pt position and status following OT treatment. Pt would benefit from continued skilled OT to increase safety and independence with completion of ADL/IADL tasks for functional independence and quality of life. Treatment: OT treatment provided this date includes:  Instruction, education and training on safe facilitation and adapted techniques for completion of ADLs.  These include ADLs, therapeutic exercise, proper positioning/alignment to improve interaction with environment and overall function and on adapted techniques/work simplification for completion of ADLs. Cues for energy conservation and safety for in the home at [place], including modifications and DME. Pt completed funmi UE exercises for carry over into bed mobility, functional transfers and indep with ADLs and client centered tasks. Pt was able to tolerate in all planes of shoulder 10 x 2 and distal extremities 20 x 2 with only min complaints of fatigue and discomfort. Fair- tolerance generally. Recommending to upgrade to 20 x 2 in all planes next session. Educated pt on importance of UB strengthening for compensation of LB weakness. Extended time to complete all tasks, including skilled monitoring of patient's response during treatment session and vital signs. Prior to and at the end of session, environmental modifications / line management completed for patients safety and efficiency of treatment session. See above for further details. Pt has made min progress towards set goals    OT 1-3x/week for 5-7 days during hospitalization      Treatment Time also includes thorough review of current medical information, gathering information on past medical history/social history and prior level of function, informal observation of tasks, assessment of data and education on plan of care and goals.      Treatment Time In: 8985    Treatment Time Out: 0553     Treatment Charges: Mins Units   ADL/Home Mgt     26093 14 1   Thera Activities     [id number]     Ther Ex                 62268  17 1    Manual Therapy    43907       Neuro Re-ed         21575       Orthotic manage/training                               [id number]       Non Billable Time       Total Timed Treatment 31 2     Rama Sanchez OTR/L; [id number]

## 2023-01-29 NOTE — PROGRESS NOTES
[facility] MD Lianet Bonilla MD Eddie Belling, MD Sim Sally, BRADLEY Devlin, NUNU Miranda, BRADLEY  Progress Note    1/29/2023    SUBJECTIVE:   1/20: Feeling little better today. Denies new complaint. Still tachypnea, though denies dyspnea no cp/palp Appetite ok ROS otherwise unremarkable    1//21 seen in her room on o2/nc bp stable weak poor po intake   2+ edema in LE     1/22 charts reviewed . On bipap    1/23 : on o2/nc . Still look hypervolemic     1/24 seen in her room comfortable o2/nc . Still with LE edema which seems to be multifactorial and will likely need to accept having some edema on board     1/25 sitting on edge of the bed working with pt . Still with considerable edema in LEs   BP stable     1/26 seen in her room reports of SOB with minimal activity , LE edema still signficant . 1/27 good UO On 5 L/o2/nc  Still with sob with activity Tachycardiac with low functional capacity     1/28: Asleep, resting and breathing comfortably on nasal cannula. Remains edematous. Ongoing fatigue and generalized weakness. 1/29: Hypotensive last evening, Bumex drip stopped, IV normal saline bolus administered to effect. Breathing little more easily though still on AVAPS. To be downgraded to CPAP shortly. Thirsty. Still markedly edematous. PROBLEM LIST:    Principal Problem:    Acute on chronic respiratory failure with hypoxia and hypercapnia (HCC)  Resolved Problems:    * No resolved hospital problems. *         DIET:    ADULT DIET; Regular; 3 carb choices (45 gm/meal); Low Fat/Low Chol/High Fiber/2 gm Na; Moderately Thick (Honey); 1800 ml  ADULT ORAL NUTRITION SUPPLEMENT; Dinner;  Other Oral Supplement; Gelatein 20     MEDS (scheduled):    potassium chloride  20 mEq Oral q8h    [Held by provider] dilTIAZem  60 mg Oral 3 times per day    sucralfate  1 g Oral 4x Daily    rOPINIRole  1 mg Oral TID    atorvastatin  20 mg Oral Nightly ipratropium-albuterol  1 vial Inhalation 4x Daily    [Held by provider] insulin glargine  13 Units SubCUTAneous BID    [Held by provider] LORazepam  0.5 mg Oral Nightly    metoprolol succinate  25 mg Oral BID    insulin lispro  0-4 Units SubCUTAneous TID     insulin lispro  0-4 Units SubCUTAneous Nightly    budesonide  0.5 mg Nebulization BID    sodium chloride flush  10 mL IntraVENous 2 times per day    miconazole   Topical BID    arformoterol tartrate  15 mcg Nebulization BID    pantoprazole  40 mg Oral QAM AC    apixaban  5 mg Oral BID    mirtazapine  7.5 mg Oral Nightly    aspirin  81 mg Oral Daily    carbidopa-levodopa  1 tablet Oral TID    sertraline  50 mg Oral Daily    montelukast  10 mg Oral Nightly    [Held by provider] midodrine  5 mg Oral TID WC       MEDS (infusions):   sodium chloride      dextrose         MEDS (prn):  loperamide, sodium chloride flush, sodium chloride, promethazine **OR** ondansetron, polyethylene glycol, acetaminophen **OR** acetaminophen, glucose, dextrose bolus **OR** dextrose bolus, glucagon (rDNA), dextrose    PHYSICAL EXAM:     Patient Vitals for the past 24 hrs:   BP Temp Temp src Pulse Resp SpO2 Weight   01/29/23 1430 -- -- -- -- 24 -- --   01/29/23 0914 (!) 94/56 98.1 °F (36.7 °C) Oral 100 20 98 % --   01/29/23 0330 (!) 92/58 -- -- 88 -- -- --   01/29/23 0130 -- -- -- 95 -- -- --   01/29/23 0128 -- -- -- -- -- -- 271 lb (122.9 kg)   01/29/23 0115 (!) 90/54 -- -- 96 -- -- --   01/28/23 2331 -- -- -- -- 22 -- --   01/28/23 2315 (!) 88/48 -- -- (!) 105 -- -- --   01/28/23 2130 (!) 82/46 -- -- (!) 110 -- -- --   01/28/23 2115 (!) 80/40 98 °F (36.7 °C) Axillary (!) 120 14 -- --   01/28/23 1850 -- -- -- (!) 107 -- -- --   01/28/23 1700 107/74 98.2 °F (36.8 °C) Oral (!) 126 20 98 % --   @      Intake/Output Summary (Last 24 hours) at 1/29/2023 1545  Last data filed at 1/29/2023 1357  Gross per 24 hour   Intake 0 ml   Output 1850 ml   Net -1850 ml         Wt Readings from Last 3 Encounters:   01/29/23 271 lb (122.9 kg)   01/16/23 259 lb (117.5 kg)   01/16/23 259 lb (117.5 kg)     Age-appropriate morbidly obese white woman, though easily arousable, no apparent distress  NC/AT EOMI sclera conjunctive are clear and anicteric mucous membranes are moist  Neck soft supple trachea midline  Coarse breath sounds with crackles on the right, mildly prolonged expiratory phase but no wheeze. Arguably a little more clear than yesterday  Regular rhythm normal S1 and S2  Abdomen soft nontender nondistended normal active bowel sounds. Abdominal pannus has substantial edema  Distal extremities, thighs, sacrum have substantial chronic type nonpitting edema, though no pitting edema  Pulses 1+ x4  Moves all 4 extremities  Cranial nerves II through XII grossly intact  Awake, alert, interactive and appropriate  Skin tone consistent with chronic venous stasis distally      DATA:    No results for input(s): WBC, HGB, HCT, MCV, PLT in the last 72 hours. Recent Labs     01/27/23  0803 01/28/23  0907 01/29/23  0817    147* 145   K 3.4* 4.2 3.3*   CL 99 99 98   CO2 37* 38* 40*   MG  --   --  1.7   PHOS  --   --  4.8*   BUN 56* 58* 61*   CREATININE 2.3* 2.3* 2.3*       Lab Results   Component Value Date    LABPROT 0.5 (H) 01/18/2023    LABPROT 0.5 01/18/2023     On CT no hydro/signs of obstruction     Urine studies  U Na 21, U Cl 23 U prot:cr ratio 0.5    ASSESSMENT / RECOMMENDATIONS:       Assessment  1. Acute kidney injury urine lytes support decrease effective volume     2. CKD stage III, Baseline creatinine 1.4 to 1.7 mg/dL, secondary to diabetic nephropathy and renal microvascular atherosclerotic disease  0.5 g proteinuria     3. Anemia due to CKD, phlebotomy associated prolonged hospitalization     4. Acute hypercapnic and hypoxic respiratory failure due to COPD exacerbation and pneumonia. Does not appear hypervolemic. 5.  Morbid obesity, BMI 50.6 kg per metered square     6.   Edema/anasarca, chronic, multifactorial, due to venous insufficiency in the setting of morbid obesity, relative immobility, likely diastolic dysfunction though it was \"indeterminate\" on echo, the does have pulmonary hypertension, mild right-sided heart failure    7.  Hypotension     Remains hypervolemic.  Difficult problem as aggressive diuresis was worsening her hypotension  Creatinine stable above baseline    Recommendations  Hold bumex drip for now  Anticipate resuming diuretic therapy tomorrow  Replace K (done)  Compression stockings   follow labs, UO  Out of bed to chair as able  Continue supportive care        Electronically signed by Lalo Gudino MD on 1/29/2023

## 2023-01-29 NOTE — PROGRESS NOTES
Pulmonary/Critical Care Progress Note    We are following patient for morbid obesity, cor pulmonale, severe    COPD, obesity hypoventilation syndrome, obstructive sleep apnea, diabetes mellitus,    Subjective: The patient is comfortable on her BiPAP but is not using her nasal cannula very much because she complains of shortness of breath. It is probably best to switch her to Airvo heated high flow nasal cannula because of the CPAP affect and because of the high flow rate which will bring down the PCO2. Diuretic therapy has been stopped. She will need a chest x-ray for follow-up tomorrow and ABGs as well.       MEDICATIONS:   potassium chloride  20 mEq Oral q8h    [Held by provider] dilTIAZem  60 mg Oral 3 times per day    sucralfate  1 g Oral 4x Daily    rOPINIRole  1 mg Oral TID    atorvastatin  20 mg Oral Nightly    ipratropium-albuterol  1 vial Inhalation 4x Daily    [Held by provider] insulin glargine  13 Units SubCUTAneous BID    [Held by provider] LORazepam  0.5 mg Oral Nightly    metoprolol succinate  25 mg Oral BID    insulin lispro  0-4 Units SubCUTAneous TID WC    insulin lispro  0-4 Units SubCUTAneous Nightly    budesonide  0.5 mg Nebulization BID    sodium chloride flush  10 mL IntraVENous 2 times per day    miconazole   Topical BID    arformoterol tartrate  15 mcg Nebulization BID    pantoprazole  40 mg Oral QAM AC    apixaban  5 mg Oral BID    mirtazapine  7.5 mg Oral Nightly    aspirin  81 mg Oral Daily    carbidopa-levodopa  1 tablet Oral TID    sertraline  50 mg Oral Daily    montelukast  10 mg Oral Nightly    [Held by provider] midodrine  5 mg Oral TID WC      sodium chloride      dextrose       loperamide, sodium chloride flush, sodium chloride, promethazine **OR** ondansetron, polyethylene glycol, acetaminophen **OR** acetaminophen, glucose, dextrose bolus **OR** dextrose bolus, glucagon (rDNA), dextrose      REVIEW OF SYSTEMS:  Constitutional: Denies fever, weight loss, night sweats, and fatigue  Skin: Denies pigmentation, dark lesions, and rashes   HEENT: Denies hearing loss, tinnitus, ear drainage, epistaxis, sore throat, and hoarseness. Cardiovascular: Denies palpitations, chest pain, and chest pressure. Respiratory: Denies cough, dyspnea at rest, hemoptysis, apnea, and choking. Gastrointestinal: Denies nausea, vomiting, poor appetite, diarrhea, heartburn or reflux  Genitourinary: Denies dysuria, frequency, urgency or hematuria  Musculoskeletal: Denies myalgias, muscle weakness, and bone pain  Neurological: Denies dizziness, vertigo, headache, and focal weakness  Psychological: Denies anxiety and depression  Endocrine: Denies heat intolerance and cold intolerance  Hematopoietic/Lymphatic: Denies bleeding problems and blood transfusions    OBJECTIVE:  Vitals:    01/29/23 1430   BP:    Pulse:    Resp: 24   Temp:    SpO2:      FiO2 : 40 %  O2 Flow Rate (L/min): (S) 4 L/min  O2 Device: PAP (positive airway pressure)    PHYSICAL EXAM:  Constitutional: No fever, chills, diaphoresis  Skin: Chronic venous stasis changes lower extremities  HEENT: Mucous membranes are moist  Neck: Large neck circumference, no JVD or lymphadenopathy  Cardiovascular: S1, S2 regular. No S3 or rubs present  Respiratory: Clear to auscultation bilaterally  Gastrointestinal: Soft, markedly obese, nontender  Genitourinary: No grossly bloody urine  Extremities: 1+ edema feet legs and ankles-improved from last week  Neurological: Awake, alert, oriented x3.   No evidence of focal motor or sensory deficits  Psychological: Depressed affect    LABS:  WBC   Date Value Ref Range Status   01/26/2023 10.6 4.5 - 11.5 E9/L Final   01/22/2023 7.1 4.5 - 11.5 E9/L Final   01/20/2023 8.9 4.5 - 11.5 E9/L Final     Hemoglobin   Date Value Ref Range Status   01/26/2023 9.2 (L) 11.5 - 15.5 g/dL Final   01/22/2023 8.2 (L) 11.5 - 15.5 g/dL Final   01/20/2023 8.0 (L) 11.5 - 15.5 g/dL Final     Hematocrit   Date Value Ref Range Status   01/26/2023 34.6 34.0 - 48.0 % Final   01/22/2023 30.8 (L) 34.0 - 48.0 % Final   01/20/2023 30.1 (L) 34.0 - 48.0 % Final     MCV   Date Value Ref Range Status   01/26/2023 94.3 80.0 - 99.9 fL Final   01/22/2023 93.1 80.0 - 99.9 fL Final   01/20/2023 95.0 80.0 - 99.9 fL Final     Platelets   Date Value Ref Range Status   01/26/2023 169 130 - 450 E9/L Final   01/22/2023 189 130 - 450 E9/L Final   01/20/2023 231 130 - 450 E9/L Final     Sodium   Date Value Ref Range Status   01/29/2023 145 132 - 146 mmol/L Final   01/28/2023 147 (H) 132 - 146 mmol/L Final   01/27/2023 145 132 - 146 mmol/L Final     Potassium   Date Value Ref Range Status   01/29/2023 3.3 (L) 3.5 - 5.0 mmol/L Final   01/28/2023 4.2 3.5 - 5.0 mmol/L Final   01/27/2023 3.4 (L) 3.5 - 5.0 mmol/L Final     Potassium reflex Magnesium   Date Value Ref Range Status   01/20/2023 4.8 3.5 - 5.0 mmol/L Final   01/19/2023 4.9 3.5 - 5.0 mmol/L Final   01/18/2023 5.2 (H) 3.5 - 5.0 mmol/L Final     Chloride   Date Value Ref Range Status   01/29/2023 98 98 - 107 mmol/L Final   01/28/2023 99 98 - 107 mmol/L Final   01/27/2023 99 98 - 107 mmol/L Final     CO2   Date Value Ref Range Status   01/29/2023 40 (H) 22 - 29 mmol/L Final   01/28/2023 38 (H) 22 - 29 mmol/L Final   01/27/2023 37 (H) 22 - 29 mmol/L Final     BUN   Date Value Ref Range Status   01/29/2023 61 (H) 6 - 23 mg/dL Final   01/28/2023 58 (H) 6 - 23 mg/dL Final   01/27/2023 56 (H) 6 - 23 mg/dL Final     Creatinine   Date Value Ref Range Status   01/29/2023 2.3 (H) 0.5 - 1.0 mg/dL Final   01/28/2023 2.3 (H) 0.5 - 1.0 mg/dL Final   01/27/2023 2.3 (H) 0.5 - 1.0 mg/dL Final     Glucose   Date Value Ref Range Status   01/29/2023 99 74 - 99 mg/dL Final   01/28/2023 89 74 - 99 mg/dL Final   01/27/2023 58 (L) 74 - 99 mg/dL Final     Calcium   Date Value Ref Range Status   01/29/2023 8.3 (L) 8.6 - 10.2 mg/dL Final   01/28/2023 8.9 8.6 - 10.2 mg/dL Final   01/27/2023 8.8 8.6 - 10.2 mg/dL Final     Total Protein   Date Value Ref Range Status   01/17/2023 6.1 (L) 6.4 - 8.3 g/dL Final   01/16/2023 6.3 (L) 6.4 - 8.3 g/dL Final   12/30/2022 5.8 (L) 6.4 - 8.3 g/dL Final     Albumin   Date Value Ref Range Status   01/17/2023 3.6 3.5 - 5.2 g/dL Final   01/16/2023 3.8 3.5 - 5.2 g/dL Final   12/30/2022 3.6 3.5 - 5.2 g/dL Final     Total Bilirubin   Date Value Ref Range Status   01/17/2023 0.5 0.0 - 1.2 mg/dL Final   01/16/2023 0.4 0.0 - 1.2 mg/dL Final   12/30/2022 0.4 0.0 - 1.2 mg/dL Final     Alkaline Phosphatase   Date Value Ref Range Status   01/17/2023 98 35 - 104 U/L Final   01/16/2023 96 35 - 104 U/L Final   12/30/2022 94 35 - 104 U/L Final     AST   Date Value Ref Range Status   01/17/2023 9 0 - 31 U/L Final   01/16/2023 10 0 - 31 U/L Final   12/30/2022 11 0 - 31 U/L Final     ALT   Date Value Ref Range Status   01/17/2023 <5 0 - 32 U/L Final   01/16/2023 <5 0 - 32 U/L Final   12/30/2022 <5 0 - 32 U/L Final     Est, Glom Filt Rate   Date Value Ref Range Status   01/29/2023 22 >=60 mL/min/1.73 Final     Comment:     Pediatric calculator link  [email or web address]_[email or web address]  Effective Oct 3, 2022  These results are not intended for use in patients  <25years of age. eGFR results are calculated without  a race factor using the 2021 CKD-EPI equation. Careful  clinical correlation is recommended, particularly when  comparing to results calculated using previous equations. The CKD-EPI equation is less accurate in patients with  extremes of muscle mass, extra-renal metabolism of  creatinine, excessive creatinine ingestion, or following  therapy that affects renal tubular secretion. 01/28/2023 22 >=60 mL/min/1.73 Final     Comment:     Pediatric calculator link  [email or web address]. [email or web address]_[email or web address]  Effective Oct 3, 2022  These results are not intended for use in patients  <25years of age. eGFR results are calculated without  a race factor using the 2021 CKD-EPI equation. Careful  clinical correlation is recommended, particularly when  comparing to results calculated using previous equations. The CKD-EPI equation is less accurate in patients with  extremes of muscle mass, extra-renal metabolism of  creatinine, excessive creatinine ingestion, or following  therapy that affects renal tubular secretion. 01/27/2023 22 >=60 mL/min/1.73 Final     Comment:     Pediatric calculator link  [facility].at. [email or web address]_[email or web address]  Effective Oct 3, 2022  These results are not intended for use in patients  <25years of age. eGFR results are calculated without  a race factor using the 2021 CKD-EPI equation. Careful  clinical correlation is recommended, particularly when  comparing to results calculated using previous equations. The CKD-EPI equation is less accurate in patients with  extremes of muscle mass, extra-renal metabolism of  creatinine, excessive creatinine ingestion, or following  therapy that affects renal tubular secretion. GFR    Date Value Ref Range Status   10/16/2022 >60  Final   10/14/2022 59  Final   10/13/2022 >60  Final     Magnesium   Date Value Ref Range Status   01/29/2023 1.7 1.6 - 2.6 mg/dL Final   12/30/2022 2.0 1.6 - 2.6 mg/dL Final   10/14/2022 2.1 1.6 - 2.6 mg/dL Final     Phosphorus   Date Value Ref Range Status   01/29/2023 4.8 (H) 2.5 - 4.5 mg/dL Final   01/20/2023 4.7 (H) 2.5 - 4.5 mg/dL Final   01/19/2023 4.9 (H) 2.5 - 4.5 mg/dL Final     Recent Labs     01/28/23  1432   PH 7.271*   PO2 67.3*   PCO2 95.5*   HCO3 43.0*   BE 13.1*   O2SAT 91.8*       RADIOLOGY:  CT ABDOMEN PELVIS WO CONTRAST Additional Contrast? None   Final Result   No nephroureterolithiasis or hydronephrosis. Splenomegaly. Small volume of perihepatic ascites. At least moderate-sized partially imaged right pleural effusion with   bibasilar atelectasis.       Several locules of gas which appear to be within the endometrium at the level of the uterine fundus, of uncertain etiology or clinical significance. Please correlate clinically. Diffuse anasarca throughout the soft tissues. Cardiomegaly. XR CHEST PORTABLE   Final Result   Stable right pleural effusion. XR CHEST PORTABLE    (Results Pending)           PROBLEM LIST:  Principal Problem:    Acute on chronic respiratory failure with hypoxia and hypercapnia (HCC)  Resolved Problems:    * No resolved hospital problems. *      IMPRESSION:  Cor pulmonale  Severe obstructive sleep apnea  Morbid obesity  Obesity hypoventilation syndrome  Acute superimposed on chronic hypoxemic   and hypercapnic respiratory failure  6. SHANTANU superimposed on CKD    PLAN:  Begin to wean off BiPAP with the use of Airvo heated high flow nasal cannula  Agree with cessation of diuretics  Needs aggressive physical therapy  Chest x-ray and ABGs in a.m.   Watch for worsening contraction/metabolic alkalosis which will drive the PCO2 up and bring the pH down  Titrate FiO2 to achieve saturations of between 90 and 93%        Electronically signed by Annabell Claude, MD on 1/29/2023 at 3:20 PM

## 2023-01-30 ENCOUNTER — APPOINTMENT (OUTPATIENT)
Dept: GENERAL RADIOLOGY | Age: 73
End: 2023-01-30
Payer: MEDICARE

## 2023-01-30 LAB
AADO2: 103.9 MMHG
ALBUMIN SERPL-MCNC: 3 G/DL (ref 3.5–5.2)
ALP BLD-CCNC: 60 U/L (ref 35–104)
ALT SERPL-CCNC: <5 U/L (ref 0–32)
ANION GAP SERPL CALCULATED.3IONS-SCNC: 12 MMOL/L (ref 7–16)
AST SERPL-CCNC: 8 U/L (ref 0–31)
B.E.: 15.6 MMOL/L (ref -3–3)
BILIRUB SERPL-MCNC: 0.3 MG/DL (ref 0–1.2)
BUN BLDV-MCNC: 64 MG/DL (ref 6–23)
CALCIUM SERPL-MCNC: 8.3 MG/DL (ref 8.6–10.2)
CHLORIDE BLD-SCNC: 99 MMOL/L (ref 98–107)
CO2: 37 MMOL/L (ref 22–29)
COHB: 0.6 % (ref 0–1.5)
COMMENT: ABNORMAL
CREAT SERPL-MCNC: 2.3 MG/DL (ref 0.5–1)
CRITICAL: ABNORMAL
DATE ANALYZED: ABNORMAL
DATE OF COLLECTION: ABNORMAL
FIO2: 40 %
GFR SERPL CREATININE-BSD FRML MDRD: 22 ML/MIN/1.73
GLUCOSE BLD-MCNC: 104 MG/DL (ref 74–99)
HCO3: 43.5 MMOL/L (ref 22–26)
HCT VFR BLD CALC: 26.6 % (ref 34–48)
HEMOGLOBIN: 7.7 G/DL (ref 11.5–15.5)
HHB: 5.3 % (ref 0–5)
LAB: ABNORMAL
Lab: ABNORMAL
MAGNESIUM: 1.6 MG/DL (ref 1.6–2.6)
MCH RBC QN AUTO: 25.8 PG (ref 26–35)
MCHC RBC AUTO-ENTMCNC: 28.9 % (ref 32–34.5)
MCV RBC AUTO: 89.3 FL (ref 80–99.9)
METER GLUCOSE: 115 MG/DL (ref 74–99)
METER GLUCOSE: 153 MG/DL (ref 74–99)
METER GLUCOSE: 163 MG/DL (ref 74–99)
METER GLUCOSE: 182 MG/DL (ref 74–99)
METHB: 0.4 % (ref 0–1.5)
MODE: ABNORMAL
O2 CONTENT: 11.2 ML/DL
O2 SATURATION: 94.6 % (ref 92–98.5)
O2HB: 93.7 % (ref 94–97)
OPERATOR ID: ABNORMAL
PATIENT TEMP: 37 C
PCO2: 80.6 MMHG (ref 35–45)
PDW BLD-RTO: 16.2 FL (ref 11.5–15)
PEEP/CPAP: 8 CMH2O
PFO2: 1.96 MMHG/%
PH BLOOD GAS: 7.35 (ref 7.35–7.45)
PHOSPHORUS: 5 MG/DL (ref 2.5–4.5)
PIP: 16 CMH2O
PLATELET # BLD: 75 E9/L (ref 130–450)
PLATELET CONFIRMATION: NORMAL
PMV BLD AUTO: 11.6 FL (ref 7–12)
PO2: 78.6 MMHG (ref 75–100)
POTASSIUM SERPL-SCNC: 3.9 MMOL/L (ref 3.5–5)
RBC # BLD: 2.98 E12/L (ref 3.5–5.5)
RI(T): 1.32
SODIUM BLD-SCNC: 148 MMOL/L (ref 132–146)
SOURCE, BLOOD GAS: ABNORMAL
THB: 8.4 G/DL (ref 11.5–16.5)
TIME ANALYZED: 530
TOTAL PROTEIN: 5 G/DL (ref 6.4–8.3)
WBC # BLD: 7.2 E9/L (ref 4.5–11.5)

## 2023-01-30 PROCEDURE — 2060000000 HC ICU INTERMEDIATE R&B

## 2023-01-30 PROCEDURE — 85027 COMPLETE CBC AUTOMATED: CPT

## 2023-01-30 PROCEDURE — 36415 COLL VENOUS BLD VENIPUNCTURE: CPT

## 2023-01-30 PROCEDURE — 84100 ASSAY OF PHOSPHORUS: CPT

## 2023-01-30 PROCEDURE — 6370000000 HC RX 637 (ALT 250 FOR IP): Performed by: INTERNAL MEDICINE

## 2023-01-30 PROCEDURE — 2580000003 HC RX 258: Performed by: INTERNAL MEDICINE

## 2023-01-30 PROCEDURE — 83735 ASSAY OF MAGNESIUM: CPT

## 2023-01-30 PROCEDURE — 97530 THERAPEUTIC ACTIVITIES: CPT | Performed by: PHYSICAL THERAPIST

## 2023-01-30 PROCEDURE — 82805 BLOOD GASES W/O2 SATURATION: CPT

## 2023-01-30 PROCEDURE — 99232 SBSQ HOSP IP/OBS MODERATE 35: CPT | Performed by: INTERNAL MEDICINE

## 2023-01-30 PROCEDURE — 6360000002 HC RX W HCPCS: Performed by: NURSE PRACTITIONER

## 2023-01-30 PROCEDURE — 80053 COMPREHEN METABOLIC PANEL: CPT

## 2023-01-30 PROCEDURE — 97110 THERAPEUTIC EXERCISES: CPT | Performed by: PHYSICAL THERAPIST

## 2023-01-30 PROCEDURE — 71045 X-RAY EXAM CHEST 1 VIEW: CPT

## 2023-01-30 PROCEDURE — 94640 AIRWAY INHALATION TREATMENT: CPT

## 2023-01-30 PROCEDURE — 82962 GLUCOSE BLOOD TEST: CPT

## 2023-01-30 PROCEDURE — 2700000000 HC OXYGEN THERAPY PER DAY

## 2023-01-30 PROCEDURE — 36600 WITHDRAWAL OF ARTERIAL BLOOD: CPT

## 2023-01-30 PROCEDURE — 6370000000 HC RX 637 (ALT 250 FOR IP): Performed by: NURSE PRACTITIONER

## 2023-01-30 PROCEDURE — 94660 CPAP INITIATION&MGMT: CPT

## 2023-01-30 RX ADMIN — SUCRALFATE 1 G: 1 TABLET ORAL at 18:18

## 2023-01-30 RX ADMIN — ATORVASTATIN CALCIUM 20 MG: 20 TABLET, FILM COATED ORAL at 20:31

## 2023-01-30 RX ADMIN — Medication 10 ML: at 09:38

## 2023-01-30 RX ADMIN — ANTI-FUNGAL POWDER MICONAZOLE NITRATE TALC FREE: 1.42 POWDER TOPICAL at 09:38

## 2023-01-30 RX ADMIN — ROPINIROLE HYDROCHLORIDE 1 MG: 1 TABLET, FILM COATED ORAL at 20:31

## 2023-01-30 RX ADMIN — ROPINIROLE HYDROCHLORIDE 1 MG: 1 TABLET, FILM COATED ORAL at 13:23

## 2023-01-30 RX ADMIN — ARFORMOTEROL TARTRATE 15 MCG: 15 SOLUTION RESPIRATORY (INHALATION) at 18:21

## 2023-01-30 RX ADMIN — BUDESONIDE 500 MCG: 0.5 SUSPENSION RESPIRATORY (INHALATION) at 05:20

## 2023-01-30 RX ADMIN — SUCRALFATE 1 G: 1 TABLET ORAL at 13:23

## 2023-01-30 RX ADMIN — ARFORMOTEROL TARTRATE 15 MCG: 15 SOLUTION RESPIRATORY (INHALATION) at 05:20

## 2023-01-30 RX ADMIN — ASPIRIN 81 MG: 81 TABLET, COATED ORAL at 09:37

## 2023-01-30 RX ADMIN — SUCRALFATE 1 G: 1 TABLET ORAL at 09:37

## 2023-01-30 RX ADMIN — ANTI-FUNGAL POWDER MICONAZOLE NITRATE TALC FREE: 1.42 POWDER TOPICAL at 20:31

## 2023-01-30 RX ADMIN — ROPINIROLE HYDROCHLORIDE 1 MG: 1 TABLET, FILM COATED ORAL at 09:37

## 2023-01-30 RX ADMIN — SERTRALINE 50 MG: 50 TABLET, FILM COATED ORAL at 09:37

## 2023-01-30 RX ADMIN — MONTELUKAST SODIUM 10 MG: 10 TABLET, FILM COATED ORAL at 20:31

## 2023-01-30 RX ADMIN — METOPROLOL SUCCINATE 25 MG: 25 TABLET, FILM COATED, EXTENDED RELEASE ORAL at 09:41

## 2023-01-30 RX ADMIN — CARBIDOPA AND LEVODOPA 1 TABLET: 50; 200 TABLET, EXTENDED RELEASE ORAL at 13:23

## 2023-01-30 RX ADMIN — APIXABAN 5 MG: 5 TABLET, FILM COATED ORAL at 09:37

## 2023-01-30 RX ADMIN — IPRATROPIUM BROMIDE AND ALBUTEROL SULFATE 3 ML: .5; 2.5 SOLUTION RESPIRATORY (INHALATION) at 18:21

## 2023-01-30 RX ADMIN — CARBIDOPA AND LEVODOPA 1 TABLET: 50; 200 TABLET, EXTENDED RELEASE ORAL at 09:37

## 2023-01-30 RX ADMIN — IPRATROPIUM BROMIDE AND ALBUTEROL SULFATE 3 ML: .5; 2.5 SOLUTION RESPIRATORY (INHALATION) at 14:31

## 2023-01-30 RX ADMIN — IPRATROPIUM BROMIDE AND ALBUTEROL SULFATE 3 ML: .5; 2.5 SOLUTION RESPIRATORY (INHALATION) at 05:20

## 2023-01-30 RX ADMIN — SUCRALFATE 1 G: 1 TABLET ORAL at 20:31

## 2023-01-30 RX ADMIN — PANTOPRAZOLE SODIUM 40 MG: 40 TABLET, DELAYED RELEASE ORAL at 06:04

## 2023-01-30 RX ADMIN — Medication 10 ML: at 20:32

## 2023-01-30 RX ADMIN — IPRATROPIUM BROMIDE AND ALBUTEROL SULFATE 3 ML: .5; 2.5 SOLUTION RESPIRATORY (INHALATION) at 09:49

## 2023-01-30 RX ADMIN — BUDESONIDE 500 MCG: 0.5 SUSPENSION RESPIRATORY (INHALATION) at 18:21

## 2023-01-30 RX ADMIN — MIRTAZAPINE 7.5 MG: 15 TABLET, FILM COATED ORAL at 20:31

## 2023-01-30 RX ADMIN — CARBIDOPA AND LEVODOPA 1 TABLET: 50; 200 TABLET, EXTENDED RELEASE ORAL at 20:31

## 2023-01-30 ASSESSMENT — PAIN SCALES - GENERAL: PAINLEVEL_OUTOF10: 0

## 2023-01-30 NOTE — CARE COORDINATION
SS NOTE: COVID NEGATIVE 1/17, PT WILL NEED A RAPID NEGATIVE COVID TEST TO RETURN TO [facility]. Pt is on 5 liters of O2, however is very comfortable ofn the bipap at 40%. Pt has positive blood cultures. Her BP is not stable. Pt has a peripheral line. She has a genao. She has a wound on her left lower leg. Pt is going to return to long term care at [facility] when dched. Pt is out of skilled days there and they will activate Medicaid for return there. For the return pt will need NO PRECERT, and will need a signed [place]. In regard to pt's concern that the Bipap here is more effective that the bipap at the [facility]. SW shared pt's bipap settings with [facility] and their settings was different- that set up the bipap there to be same setting as here. SS to continue. ESTHER Herman.1/30/2023.12:15PM.

## 2023-01-30 NOTE — PLAN OF CARE

## 2023-01-30 NOTE — PROGRESS NOTES
Pulmonary/Critical Care Progress Note    We are following patient for acute respiratory failure, complete opacification of right hemithorax secondary to large pleural effusion, severe COPD, cor pulmonale, obstructive sleep apnea, atrial fibrillation, obesity hypoventilation syndrome, diabetes mellitus, possible diastolic dysfunction, Parkinson's disease    SUBJECTIVE: We repeated a chest x-ray today since she has not had 1 since 1/17/2023 which shows complete right hemithorax opacification secondary to a large pleural effusion which has become much worse over the last 13 days. This explains the need for Airvo when she is off BiPAP and her increased work of breathing. We will schedule a thoracentesis for tomorrow in interventional radiology. Since the patient is already been diuresed considerably, I do not believe that chest tube drainage is necessary.       MEDICATIONS:   [Held by provider] dilTIAZem  60 mg Oral 3 times per day    sucralfate  1 g Oral 4x Daily    rOPINIRole  1 mg Oral TID    atorvastatin  20 mg Oral Nightly    ipratropium-albuterol  1 vial Inhalation 4x Daily    [Held by provider] insulin glargine  13 Units SubCUTAneous BID    [Held by provider] LORazepam  0.5 mg Oral Nightly    metoprolol succinate  25 mg Oral BID    insulin lispro  0-4 Units SubCUTAneous TID WC    insulin lispro  0-4 Units SubCUTAneous Nightly    budesonide  0.5 mg Nebulization BID    sodium chloride flush  10 mL IntraVENous 2 times per day    miconazole   Topical BID    arformoterol tartrate  15 mcg Nebulization BID    pantoprazole  40 mg Oral QAM AC    apixaban  5 mg Oral BID    mirtazapine  7.5 mg Oral Nightly    aspirin  81 mg Oral Daily    carbidopa-levodopa  1 tablet Oral TID    sertraline  50 mg Oral Daily    montelukast  10 mg Oral Nightly    [Held by provider] midodrine  5 mg Oral TID WC      sodium chloride      dextrose       sodium chloride, loperamide, sodium chloride flush, sodium chloride, promethazine **OR** ondansetron, polyethylene glycol, acetaminophen **OR** acetaminophen, glucose, dextrose bolus **OR** dextrose bolus, glucagon (rDNA), dextrose      REVIEW OF SYSTEMS:  Constitutional: Denies fever, weight loss, night sweats, and fatigue  Skin: Denies pigmentation, dark lesions, and rashes   HEENT: Denies hearing loss, tinnitus, ear drainage, epistaxis, sore throat, and hoarseness. Cardiovascular: Denies palpitations, chest pain, and chest pressure. Respiratory: Denies cough, but positive for dyspnea at rest, negative for hemoptysis, apnea, and choking. Gastrointestinal: Denies nausea, vomiting, poor appetite, diarrhea, heartburn or reflux  Genitourinary: Denies dysuria, frequency, urgency or hematuria  Musculoskeletal: Denies myalgias, muscle weakness, and bone pain  Neurological: Denies dizziness, vertigo, headache, and focal weakness  Psychological: Denies anxiety and depression  Endocrine: Denies heat intolerance and cold intolerance  Hematopoietic/Lymphatic: Denies bleeding problems and blood transfusions    OBJECTIVE:  Vitals:    01/30/23 1431   BP:    Pulse:    Resp: 23   Temp:    SpO2:      FiO2 : 40 %  O2 Flow Rate (L/min): 60 L/min  O2 Device: PAP (positive airway pressure)    PHYSICAL EXAM:  Constitutional: No clubbing, cyanosis, or edema  Skin: Venous stasis changes lower extremities but no cellulitic changes  HEENT: Unremarkable. Mucous membranes are moist  Neck: No JVD, lymphadenopathy, thyromegaly  Cardiovascular: S1, S2 irregular. No S3 or rubs present  Respiratory: Dullness to percussion right hemithorax with diminished breath sounds, fairly clear on the left  Gastrointestinal: Soft, obese, nontender  Genitourinary: No CVA tenderness detectable  Extremities: Trace to 1+ edema feet legs and ankles with chronic venous stasis changes  Neurological: Awake, alert, oriented x3.   No evidence of focal motor or sensory deficits  Psychological: Anxious, mildly depressed affect    LABS:  WBC   Date Value Ref Range Status   01/30/2023 7.2 4.5 - 11.5 E9/L Final   01/26/2023 10.6 4.5 - 11.5 E9/L Final   01/22/2023 7.1 4.5 - 11.5 E9/L Final     Hemoglobin   Date Value Ref Range Status   01/30/2023 7.7 (L) 11.5 - 15.5 g/dL Final   01/26/2023 9.2 (L) 11.5 - 15.5 g/dL Final   01/22/2023 8.2 (L) 11.5 - 15.5 g/dL Final     Hematocrit   Date Value Ref Range Status   01/30/2023 26.6 (L) 34.0 - 48.0 % Final   01/26/2023 34.6 34.0 - 48.0 % Final   01/22/2023 30.8 (L) 34.0 - 48.0 % Final     MCV   Date Value Ref Range Status   01/30/2023 89.3 80.0 - 99.9 fL Final   01/26/2023 94.3 80.0 - 99.9 fL Final   01/22/2023 93.1 80.0 - 99.9 fL Final     Platelets   Date Value Ref Range Status   01/30/2023 75 (L) 130 - 450 E9/L Final   01/26/2023 169 130 - 450 E9/L Final   01/22/2023 189 130 - 450 E9/L Final     Sodium   Date Value Ref Range Status   01/30/2023 148 (H) 132 - 146 mmol/L Final   01/29/2023 145 132 - 146 mmol/L Final   01/28/2023 147 (H) 132 - 146 mmol/L Final     Potassium   Date Value Ref Range Status   01/30/2023 3.9 3.5 - 5.0 mmol/L Final   01/29/2023 3.3 (L) 3.5 - 5.0 mmol/L Final   01/28/2023 4.2 3.5 - 5.0 mmol/L Final     Potassium reflex Magnesium   Date Value Ref Range Status   01/20/2023 4.8 3.5 - 5.0 mmol/L Final   01/19/2023 4.9 3.5 - 5.0 mmol/L Final   01/18/2023 5.2 (H) 3.5 - 5.0 mmol/L Final     Chloride   Date Value Ref Range Status   01/30/2023 99 98 - 107 mmol/L Final   01/29/2023 98 98 - 107 mmol/L Final   01/28/2023 99 98 - 107 mmol/L Final     CO2   Date Value Ref Range Status   01/30/2023 37 (H) 22 - 29 mmol/L Final   01/29/2023 40 (H) 22 - 29 mmol/L Final   01/28/2023 38 (H) 22 - 29 mmol/L Final     BUN   Date Value Ref Range Status   01/30/2023 64 (H) 6 - 23 mg/dL Final   01/29/2023 61 (H) 6 - 23 mg/dL Final   01/28/2023 58 (H) 6 - 23 mg/dL Final     Creatinine   Date Value Ref Range Status   01/30/2023 2.3 (H) 0.5 - 1.0 mg/dL Final   01/29/2023 2.3 (H) 0.5 - 1.0 mg/dL Final   01/28/2023 2.3 (H) 0.5 - 1.0 mg/dL Final     Glucose   Date Value Ref Range Status   01/30/2023 104 (H) 74 - 99 mg/dL Final   01/29/2023 99 74 - 99 mg/dL Final   01/28/2023 89 74 - 99 mg/dL Final     Calcium   Date Value Ref Range Status   01/30/2023 8.3 (L) 8.6 - 10.2 mg/dL Final   01/29/2023 8.3 (L) 8.6 - 10.2 mg/dL Final   01/28/2023 8.9 8.6 - 10.2 mg/dL Final     Total Protein   Date Value Ref Range Status   01/30/2023 5.0 (L) 6.4 - 8.3 g/dL Final   01/17/2023 6.1 (L) 6.4 - 8.3 g/dL Final   01/16/2023 6.3 (L) 6.4 - 8.3 g/dL Final     Albumin   Date Value Ref Range Status   01/30/2023 3.0 (L) 3.5 - 5.2 g/dL Final   01/17/2023 3.6 3.5 - 5.2 g/dL Final   01/16/2023 3.8 3.5 - 5.2 g/dL Final     Total Bilirubin   Date Value Ref Range Status   01/30/2023 0.3 0.0 - 1.2 mg/dL Final   01/17/2023 0.5 0.0 - 1.2 mg/dL Final   01/16/2023 0.4 0.0 - 1.2 mg/dL Final     Alkaline Phosphatase   Date Value Ref Range Status   01/30/2023 60 35 - 104 U/L Final   01/17/2023 98 35 - 104 U/L Final   01/16/2023 96 35 - 104 U/L Final     AST   Date Value Ref Range Status   01/30/2023 8 0 - 31 U/L Final   01/17/2023 9 0 - 31 U/L Final   01/16/2023 10 0 - 31 U/L Final     ALT   Date Value Ref Range Status   01/30/2023 <5 0 - 32 U/L Final   01/17/2023 <5 0 - 32 U/L Final   01/16/2023 <5 0 - 32 U/L Final     Est, Glom Filt Rate   Date Value Ref Range Status   01/30/2023 22 >=60 mL/min/1.73 Final     Comment:     Pediatric calculator link  Ml.at. [email or web address]/gfr_calculatorped  Effective Oct 3, 2022  These results are not intended for use in patients  <25years of age. eGFR results are calculated without  a race factor using the 2021 CKD-EPI equation. Careful  clinical correlation is recommended, particularly when  comparing to results calculated using previous equations.   The CKD-EPI equation is less accurate in patients with  extremes of muscle mass, extra-renal metabolism of  creatinine, excessive creatinine ingestion, or following  therapy that affects renal tubular secretion. 01/29/2023 22 >=60 mL/min/1.73 Final     Comment:     Pediatric calculator link  sCoolTV.at. [email or web address]/gfr_calculatorped  Effective Oct 3, 2022  These results are not intended for use in patients  <25years of age. eGFR results are calculated without  a race factor using the 2021 CKD-EPI equation. Careful  clinical correlation is recommended, particularly when  comparing to results calculated using previous equations. The CKD-EPI equation is less accurate in patients with  extremes of muscle mass, extra-renal metabolism of  creatinine, excessive creatinine ingestion, or following  therapy that affects renal tubular secretion. 01/28/2023 22 >=60 mL/min/1.73 Final     Comment:     Pediatric calculator link  sCoolTV.at. [email or web address]/gfr_calculatorped  Effective Oct 3, 2022  These results are not intended for use in patients  <25years of age. eGFR results are calculated without  a race factor using the 2021 CKD-EPI equation. Careful  clinical correlation is recommended, particularly when  comparing to results calculated using previous equations. The CKD-EPI equation is less accurate in patients with  extremes of muscle mass, extra-renal metabolism of  creatinine, excessive creatinine ingestion, or following  therapy that affects renal tubular secretion.        GFR    Date Value Ref Range Status   10/16/2022 >60  Final   10/14/2022 59  Final   10/13/2022 >60  Final     Magnesium   Date Value Ref Range Status   01/30/2023 1.6 1.6 - 2.6 mg/dL Final   01/29/2023 1.7 1.6 - 2.6 mg/dL Final   12/30/2022 2.0 1.6 - 2.6 mg/dL Final     Phosphorus   Date Value Ref Range Status   01/30/2023 5.0 (H) 2.5 - 4.5 mg/dL Final   01/29/2023 4.8 (H) 2.5 - 4.5 mg/dL Final   01/20/2023 4.7 (H) 2.5 - 4.5 mg/dL Final     Recent Labs     01/30/23  0530   PH 7.350   PO2 78.6   PCO2 80.6*   HCO3 43.5*   BE 15.6*   O2SAT 94.6 RADIOLOGY:  XR CHEST PORTABLE   Final Result   Complete opacification of the right hemithorax related to pleural effusion. Modest contralateral infiltrate and or atelectasis at the left lower chest.         CT ABDOMEN PELVIS WO CONTRAST Additional Contrast? None   Final Result   No nephroureterolithiasis or hydronephrosis. Splenomegaly. Small volume of perihepatic ascites. At least moderate-sized partially imaged right pleural effusion with   bibasilar atelectasis. Several locules of gas which appear to be within the endometrium at the level   of the uterine fundus, of uncertain etiology or clinical significance. Please correlate clinically. Diffuse anasarca throughout the soft tissues. Cardiomegaly. XR CHEST PORTABLE   Final Result   Stable right pleural effusion. PROBLEM LIST:  Principal Problem:    Acute on chronic respiratory failure with hypoxia and hypercapnia (HCC)  Resolved Problems:    * No resolved hospital problems. *      IMPRESSION:  Cor pulmonale  Large accumulation of right pleural fluid causing opacification of right hemithorax  Obesity hypoventilation syndrome  Morbid obesity  Chronic atrial fibrillation  SHANTANU superimposed on CKD  Acute superimposed on chronic hypoxemic and hypercapnic respiratory failure    PLAN:  Right thoracentesis tomorrow  Try to wean BiPAP after that  Lower FiO2 if possible  Labs in a.m. ATTESTATION:  ICU Staff Physician note of personal involvement in Care  As the attending physician, I certify that I personally reviewed the patients history and personally examined the patient to confirm the physical findings described above,  And that I reviewed the relevant imaging studies and available reports. I also discussed the differential diagnosis and all of the proposed management plans with the patient and individuals accompanying the patient to this visit.   They had the opportunity to ask questions about the proposed management plans and to have those questions answered. This patient has a high probability of sudden, clinically significant deterioration, which requires the highest level of physician preparedness to intervene urgently. I managed/supervised life or organ supporting interventions that required frequent physician assessment. I devoted my full attention to the direct care of this patient for the amount of time indicated below. Time I spent with the family or surrogate(s) is included only if the patient was incapable of providing the necessary information or participating in medical decisions - Time devoted to teaching and to any procedures I billed separately is not included.     CRITICAL CARE TIME:  30 minutes    Electronically signed by Santiago Villatoro MD on 1/30/2023 at 3:37 PM

## 2023-01-30 NOTE — PROGRESS NOTES
Physical Therapy  Physical Therapy Treatment Note/Plan of Care    Room #:  2302/5019-34  Patient Name: Bertha Paredes  YOB: 1950  MRN: [id number]    Date of Service: 1/30/2023     Tentative placement recommendation: Subacute Rehab  Equipment recommendation:  To be determined      Evaluating Physical Therapist: Colby Shone, PT, DPT #[id number]      Specific Provider Orders/Date/Referring Provider :     01/19/23 0745    PT eval and treat  Start:  01/19/23 0745,   End:  01/19/23 0745,   ONE TIME,   Standing Count:  1 Occurrences,   Donovan Nieto MD Acknowledge New     Admitting Diagnosis:   NSTEMI (non-ST elevated myocardial infarction) [facility]) [I21.4]  Atrial fibrillation with rapid ventricular response (HCC) [I48.91]  Recurrent right pleural effusion [J90]  Hypotension, unspecified hypotension type [I95.9]  Acute on chronic respiratory failure with hypoxia and hypercapnia (HCC) [J96.21, J96.22]      Surgery: none  Visit Diagnoses         Codes    Recurrent right pleural effusion     J90    NSTEMI (non-ST elevated myocardial infarction) ([facility] [address]     I21.4            Patient Active Problem List   Diagnosis    Depression with anxiety    Osteoporosis    Asthma    Hyperlipidemia    Mitral regurgitation    Obstructive sleep apnea syndrome    Psoriasis    Diabetes mellitus ([address]    Parkinson's disease ([address]    Primary hypertension    Microalbuminuria    Morbid obesity (HCC)    RLS (restless legs syndrome)    Generalized weakness    Inability to walk    Hypothyroidism    Chest pain    Acute asthma exacerbation    Asthma exacerbation, mild    Paroxysmal atrial fibrillation (HCC)    Atrial fibrillation with rapid ventricular response (HCC)    Acute on chronic congestive heart failure ([address]    Coronary artery disease involving native coronary artery of native heart without angina pectoris    Dysphagia    Hepatosplenomegaly    Acute decompensated heart failure (HCC)    Acute diastolic (congestive) heart failure (HCC)    Nonrheumatic tricuspid valve regurgitation    Tobacco abuse    Recurrent syncope    Septic shock (HCC)    Seizure-like activity (HCC)    Acute kidney injury ([facility] [address]    Pancytopenia (HCC)    Thrombocytopenia (HCC)    Encephalopathy    Acute respiratory failure with hypoxia (HCC)    Lactic acidosis    Delirium    Acute on chronic anemia    Pleural effusion, right    Acute respiratory failure with hypoxia and hypercapnia (HCC)    Acute confusion    Acute on chronic diastolic heart failure (HCC)    Macrocytosis    Other specified anemias    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[facility]    Puerperal sepsis with acute hypoxic respiratory failure without septic shock (HCC)    Pulmonary HTN (HCC)    Chronic anticoagulation    RVF (right ventricular failure) (HCC)    Metabolic alkalosis    Sepsis (HCC)    COPD exacerbation (HCC)    Acute on chronic respiratory failure with hypercapnia (HCC)    Persistent atrial fibrillation (HCC)    Hypercapnic respiratory failure (HCC)    Acute on chronic respiratory failure (HCC)    Hyperkalemia    Heart failure (HCC)    Acute renal insufficiency    Hypotension    Anemia    Acute on chronic respiratory failure with hypoxia and hypercapnia (HCC)        ASSESSMENT of Current Deficits Patient exhibits decreased strength, balance, and endurance impairing functional mobility, transfers, gait , gait distance, and tolerance to activity. Pt able to sit EOB for 20 minutes and performed seated exercises sitting EOB.        PHYSICAL THERAPY  PLAN OF CARE       Physical therapy plan of care is established based on physician order,  patient diagnosis and clinical assessment    Current Treatment Recommendations:    -Bed Mobility: Lower extremity exercises  and Trunk control activities   -Sitting Balance: Incorporate reaching activities to activate trunk muscles , Hands on support to maintain midline , Facilitate active trunk muscle engagement , Facilitate postural control in all planes , and Engage in core activities to allow for movement within base of support   -Standing Balance: Perform strengthening exercises in standing to promote motor control with or without upper extremity support , Instruct patient on adequate base of support to maintain balance, and Challenge balance utilizing reaching  activities beyond center of gravity    -Transfers: Provide instruction on proper hand and foot position for adequate transfer of weight onto lower extremities and use of gait device if needed, Cues for hand placement, technique and safety. Provide stabilization to prevent fall , Facilitate weight shift forward on to lower extremities and provide necessary stabilization of bilateral lower extremities , Support transfer of weight on to lower extremities, and Assist with extension of knees trunk and hip to accept weight transfer   -Gait: Gait training, Standing activities to improve: base of support, weight shift, weight bearing , Exercises to improve trunk control, Exercises to improve hip and knee control, Performance of protected weight bearing activities, and Activities to increase weight bearing   -Endurance: Utilize Supervised activities to increase level of endurance to allow for safe functional mobility including transfers and gait  and Use graduated activities to promote good breathing techniques and provide support and education to maximize respiratory function    PT long term treatment goals are located in below grid    Patient and or family understand(s) diagnosis, prognosis, and plan of care. Frequency of treatments: Patient will be seen  3-5 times/week.          Prior Level of Function: Patient ambulated with wheeled walker for short distances  Rehab Potential: fair + for baseline    Past medical history:   Past Medical History:   Diagnosis Date    A-fib ([facility]     Acute on chronic congestive heart failure (HCC)     Anxiety     Asthma     CAD (coronary artery disease) 01/21/2016    Cancer ([facility] breast ca 2006    right lumpectomy    Chronic kidney disease     nephrolithiasis    COPD exacerbation ([facility] [address] 10/12/2022    Depression     Diabetes mellitus ([facility] [address]     H/O mammogram     Hx MRSA infection     toe infection january 2012    Hyperlipidemia     Hypertension     Lateral epicondylitis     Morbid obesity (HCC)     SERENA on CPAP     Oxygen dependent     Parkinson's disease ([facility]     Tubal ligation status      Past Surgical History:   Procedure Laterality Date    BREAST LUMPECTOMY      BREAST REDUCTION SURGERY      CARDIAC CATHETERIZATION  4/28/2014    Dr. Cora Hogan  01/11/2022    Dr. Nieves Delaney  7/29/15    CT GUIDED CHEST TUBE  7/8/2022    CT GUIDED CHEST TUBE 7/8/2022 Latosha Cooney MD [facility]    ENDOSCOPY, COLON, DIAGNOSTIC  7/19/15    GALLBLADDER SURGERY      LUMBAR LAMINECTOMY      TOE AMPUTATION      TONSILLECTOMY      UPPER GASTROINTESTINAL ENDOSCOPY      UPPER GASTROINTESTINAL ENDOSCOPY N/A 7/19/2022    EGD ESOPHAGOGASTRODUODENOSCOPY performed by Collette Hobbs MD at [address]:    Precautions:  Up with assistance, falls, O2, and morbid obesity      Social history: Patient from SNF    Equipment owned: Wheelchair, Wheeled Walker, and [address] [place] [address] [address] Mobility Inpatient   How much difficulty turning over in bed?: A Lot  How much difficulty sitting down on / standing up from a chair with arms?: A Lot  How much difficulty moving from lying on back to sitting on side of bed?: A Lot  How much help from another person moving to and from a bed to a chair?: Total  How much help from another person needed to walk in hospital room?: Total  How much help from another person for climbing 3-5 steps with a railing?: Total  AM-PAC Inpatient Mobility Raw Score : 9  AM-[facility] Inpatient T-Scale Score : 30.55  Mobility Inpatient CMS 0-100% Score: 81.38  Mobility Inpatient CMS G-Code Modifier : CM    Nursing cleared patient for PT treatment. OBJECTIVE;   Initial Evaluation  Date: 1/19/2023 Treatment Date:  1/30/2023       Short Term/ Long Term   Goals   Was pt agreeable to Eval/treatment? Yes yes To be met in 3 days   Pain level   0/10   0/10    Bed Mobility    Rolling: Maximal assist of 1    Supine to sit: Maximal assist of 1    Sit to supine: Maximal assist of 1    Scooting: Maximal assist of 1   Rolling: Moderate assist of 1   Supine to sit: Moderate assist of 1   Sit to supine: Moderate assist of 1   Scooting: Moderate assist of 1    Rolling: Minimal assist of 1    Supine to sit: Minimal assist of 1    Sit to supine: Minimal assist of 1    Scooting: Minimal assist of 1     Transfers Sit to stand: Not assessed  d/t fair- seated balance and pt declining standing Sit to stand: Not assessed  by declined      Sit to stand:  Moderate assist of 1    Ambulation    not assessed     > 15 feet using  wheeled walker with Moderate assist of 1    Stair negotiation: ascended and descended   Not assessed           ROM Within functional limits    Increase range of motion 10% of affected joints    Strength BUE:  refer to OT eval  RLE:  4-/5  LLE:  4-/5  Increase strength in affected mm groups by 1/3 grade   Balance Sitting EOB:  fair -  Dynamic Standing:  not assessed  Sitting EOB: fair   Dynamic Standing: not assessed    Sitting EOB:  fair   Dynamic Standing: fair - with [address]     Patient is Alert & Oriented x person, place, time, and situation and follows directions    Sensation:  Patient  denies numbness/tingling   Edema:  no   Endurance: fair -    Vitals: Bipap   Blood Pressure at rest  Blood Pressure during session    Heart Rate at rest  Heart Rate during session    SPO2 at rest %  SPO2 during session %     Patient education  Patient educated on role of Physical Therapy, risks of immobility, safety and plan of care, energy conservation,  importance of mobility while in hospital , ankle pumps, quad set and glut set for edema control, blood clot prevention, importance and purpose of adaptive device and adjusted to proper height for the patient. , and safety      Patient response to education:   Pt verbalized understanding Pt demonstrated skill Pt requires further education in this area   Yes Partial Yes      Treatment:  Patient practiced and was instructed/facilitated in the following treatment: Patient performed bed mobility, transfers, exercises sitting EOB. Therapeutic Exercises:  ankle pumps, heel raises, hip abduction/adduction, and long arc quad, x 15 reps. At end of session, patient in bed with alarm call light and phone within reach,  all lines and tubes intact, nursing notified. Patient would benefit from continued skilled Physical Therapy to improve functional independence and quality of life.          Patient's/ family goals   rehab    Time in     907  Time out   932    Total Treatment Time  25 minutes      CPT codes:    Therapeutic activities (57623)   10 minutes  1 unit(s)  Therapeutic exercises (05420)   15 minutes  1 unit(s)    Cristian Santacruz PT License Number [id number]

## 2023-01-30 NOTE — PROGRESS NOTES
Department of Internal Medicine  General Internal Medicine  Attending Progress Note  Chief Complaint   Patient presents with    Respiratory Distress     Normally on 3L NC, came in on CPAP, given total of 50mcg push dose epi for low BP by EMS     SUBJECTIVE:    Patient still bipap dependent    No chest pain. No diarrhea.      OBJECTIVE      Medications    Current Facility-Administered Medications: sodium chloride (OCEAN, BABY AYR) 0.65 % nasal spray 1 spray, 1 spray, Each Nostril, PRN  [Held by provider] dilTIAZem (CARDIZEM) tablet 60 mg, 60 mg, Oral, 3 times per day  sucralfate (CARAFATE) tablet 1 g, 1 g, Oral, 4x Daily  rOPINIRole (REQUIP) tablet 1 mg, 1 mg, Oral, TID  atorvastatin (LIPITOR) tablet 20 mg, 20 mg, Oral, Nightly  ipratropium-albuterol (DUONEB) nebulizer solution 3 mL, 1 vial, Inhalation, 4x Daily  [Held by provider] insulin glargine (LANTUS) injection vial 13 Units, 13 Units, SubCUTAneous, BID  loperamide (IMODIUM) capsule 2 mg, 2 mg, Oral, 4x Daily PRN  [Held by provider] LORazepam (ATIVAN) tablet 0.5 mg, 0.5 mg, Oral, Nightly  metoprolol succinate (TOPROL XL) extended release tablet 25 mg, 25 mg, Oral, BID  insulin lispro (HUMALOG) injection vial 0-4 Units, 0-4 Units, SubCUTAneous, TID WC  insulin lispro (HUMALOG) injection vial 0-4 Units, 0-4 Units, SubCUTAneous, Nightly  budesonide (PULMICORT) nebulizer suspension 500 mcg, 0.5 mg, Nebulization, BID  sodium chloride flush 0.9 % injection 10 mL, 10 mL, IntraVENous, 2 times per day  sodium chloride flush 0.9 % injection 10 mL, 10 mL, IntraVENous, PRN  0.9 % sodium chloride infusion, , IntraVENous, PRN  promethazine (PHENERGAN) tablet 12.5 mg, 12.5 mg, Oral, Q6H PRN **OR** ondansetron (ZOFRAN) injection 4 mg, 4 mg, IntraVENous, Q6H PRN  polyethylene glycol (GLYCOLAX) packet 17 g, 17 g, Oral, Daily PRN  acetaminophen (TYLENOL) tablet 650 mg, 650 mg, Oral, Q6H PRN **OR** acetaminophen (TYLENOL) suppository 650 mg, 650 mg, Rectal, Q6H PRN  miconazole (MICOTIN) 2 % powder, , Topical, BID  arformoterol tartrate (BROVANA) nebulizer solution 15 mcg, 15 mcg, Nebulization, BID  pantoprazole (PROTONIX) tablet 40 mg, 40 mg, Oral, QAM AC  glucose chewable tablet 16 g, 4 tablet, Oral, PRN  dextrose bolus 10% 125 mL, 125 mL, IntraVENous, PRN **OR** dextrose bolus 10% 250 mL, 250 mL, IntraVENous, PRN  glucagon (rDNA) injection 1 mg, 1 mg, SubCUTAneous, PRN  dextrose 10 % infusion, , IntraVENous, Continuous PRN  apixaban (ELIQUIS) tablet 5 mg, 5 mg, Oral, BID  mirtazapine (REMERON) tablet 7.5 mg, 7.5 mg, Oral, Nightly  aspirin EC tablet 81 mg, 81 mg, Oral, Daily  carbidopa-levodopa (SINEMET CR)  MG per extended release tablet 1 tablet, 1 tablet, Oral, TID  sertraline (ZOLOFT) tablet 50 mg, 50 mg, Oral, Daily  montelukast (SINGULAIR) tablet 10 mg, 10 mg, Oral, Nightly  [Held by provider] midodrine (PROAMATINE) tablet 5 mg, 5 mg, Oral, TID WC  Physical    VITALS:  BP 97/66   Pulse (!) 110   Temp 97.7 °F (36.5 °C) (Axillary)   Resp 22   Ht 5' (1.524 m)   Wt 257 lb (116.6 kg) Comment: Patient placed on new bed, weight is off from previous bed scale.   SpO2 98%   BMI 50.19 kg/m²   CONSTITUTIONAL:  awake, cooperative, and no apparent distress  EYES:  extra-ocular muscles intact and vision intact  ENT:  normocepalic, without obvious abnormality  NECK:  supple, symmetrical, trachea midline  LUNGS:  no increased work of breathing, no retractions, and clear to auscultation  CARDIOVASCULAR:  normal apical pulses and normal S1 and S2  ABDOMEN:  normal bowel sounds, non-distended, and non-tender  MUSCULOSKELETAL:  bilateral lower extremity edema  NEUROLOGIC:  Mental Status Exam:  Level of Alertness:   awake  Orientation:   person, place, time  SKIN:  no bruising or bleeding  Data    CBC:   Lab Results   Component Value Date/Time    WBC 7.2 01/30/2023 05:00 AM    RBC 2.98 01/30/2023 05:00 AM    HGB 7.7 01/30/2023 05:00 AM    HCT 26.6 01/30/2023 05:00 AM    MCV 89.3 01/30/2023 05:00 AM    MCH 25.8 01/30/2023 05:00 AM    MCHC 28.9 01/30/2023 05:00 AM    RDW 16.2 01/30/2023 05:00 AM    PLT 75 01/30/2023 05:00 AM    MPV 11.6 01/30/2023 05:00 AM     BMP:    Lab Results   Component Value Date/Time     01/30/2023 05:00 AM    K 3.9 01/30/2023 05:00 AM    K 4.8 01/20/2023 05:07 AM    CL 99 01/30/2023 05:00 AM    CO2 37 01/30/2023 05:00 AM    BUN 64 01/30/2023 05:00 AM    LABALBU 3.0 01/30/2023 05:00 AM    CREATININE 2.3 01/30/2023 05:00 AM    CALCIUM 8.3 01/30/2023 05:00 AM    GFRAA >60 10/16/2022 09:20 AM    LABGLOM 22 01/30/2023 05:00 AM    GLUCOSE 104 01/30/2023 05:00 AM       ASSESSMENT AND PLAN    Summary of stay: This is a 67 y.o. female  has a past medical history of A-fib ([address] Acute on chronic congestive heart failure ([address] Anxiety, Asthma, CAD (coronary artery disease), Cancer ([address] Chronic kidney disease, COPD exacerbation ([address] Depression, Diabetes mellitus ([address] H/O mammogram, Hx MRSA infection, Hyperlipidemia, Hypertension, Lateral epicondylitis, Morbid obesity ([address] SERENA on CPAP, Oxygen dependent, Parkinson's disease ([address] and Tubal ligation status. presented with SOB, hypotension  for last few days prior to arrival to the hospital. SOB is associated with some cough, nonproductive but no fever or chills reported. Initially SOB was mild, intermittent but gradually getting more persistent. Started gradually. Exacerbated by general activity or exertion. Relieved only partially by rest. In ED patient was found to be hypotensive with Afib RVR and respiratory failure and was placed on BIPAP. BP improved after midodrine dose. The patient was seen and examined at bedside, appears alert and awake with no acute distress and is able to answer simple  questions.  On direct questioning, patient denied any  resting ongoing chest pain, hemoptysis, productive cough, fever, ongoing palpitation, active abdominal pain, hematemesis, rectal bleeding, blessing, hematuria, any other  and GI complaints, and any new focal neuro deficits     Acute on chronic respiratory failure with hypoxia and hypercapnia & Obesity hypoventilation syndrome: s/p Bumex Drip per Nephro. Continue CPAP and wean off as recommended by pulm. Congestive heart failure: decompensated  Atrial Fibrillation: On eliquis  UTI: vre on cx s/p zyvox stopped by ID  Acute on chronic Kidney disease:  Renal function is stable with crt of 2.3  Dm type 2: Insulin held due to Hypoglycemia   Parkinson disease:  sinemet cr  Pulm Htn: Pulm following  DVT prophylaxis: Lovenox  Full code.  Palliative care consult d/w rn  Disposition: community skilled

## 2023-01-31 PROBLEM — Z51.5 PALLIATIVE CARE ENCOUNTER: Status: ACTIVE | Noted: 2023-01-31

## 2023-01-31 LAB
ANION GAP SERPL CALCULATED.3IONS-SCNC: 9 MMOL/L (ref 7–16)
BUN BLDV-MCNC: 61 MG/DL (ref 6–23)
CALCIUM SERPL-MCNC: 8.4 MG/DL (ref 8.6–10.2)
CHLORIDE BLD-SCNC: 98 MMOL/L (ref 98–107)
CO2: 39 MMOL/L (ref 22–29)
CREAT SERPL-MCNC: 2.3 MG/DL (ref 0.5–1)
GFR SERPL CREATININE-BSD FRML MDRD: 22 ML/MIN/1.73
GLUCOSE BLD-MCNC: 111 MG/DL (ref 74–99)
INR BLD: 3
METER GLUCOSE: 114 MG/DL (ref 74–99)
METER GLUCOSE: 138 MG/DL (ref 74–99)
METER GLUCOSE: 149 MG/DL (ref 74–99)
METER GLUCOSE: 195 MG/DL (ref 74–99)
PHOSPHORUS: 4.5 MG/DL (ref 2.5–4.5)
POTASSIUM SERPL-SCNC: 3 MMOL/L (ref 3.5–5)
PROTHROMBIN TIME: 35.4 SEC (ref 9.3–12.4)
SODIUM BLD-SCNC: 146 MMOL/L (ref 132–146)

## 2023-01-31 PROCEDURE — 99222 1ST HOSP IP/OBS MODERATE 55: CPT | Performed by: NURSE PRACTITIONER

## 2023-01-31 PROCEDURE — 84100 ASSAY OF PHOSPHORUS: CPT

## 2023-01-31 PROCEDURE — 36415 COLL VENOUS BLD VENIPUNCTURE: CPT

## 2023-01-31 PROCEDURE — 2700000000 HC OXYGEN THERAPY PER DAY

## 2023-01-31 PROCEDURE — 2060000000 HC ICU INTERMEDIATE R&B

## 2023-01-31 PROCEDURE — 2580000003 HC RX 258: Performed by: INTERNAL MEDICINE

## 2023-01-31 PROCEDURE — 97535 SELF CARE MNGMENT TRAINING: CPT

## 2023-01-31 PROCEDURE — 99232 SBSQ HOSP IP/OBS MODERATE 35: CPT | Performed by: INTERNAL MEDICINE

## 2023-01-31 PROCEDURE — 94640 AIRWAY INHALATION TREATMENT: CPT

## 2023-01-31 PROCEDURE — 97110 THERAPEUTIC EXERCISES: CPT

## 2023-01-31 PROCEDURE — 6370000000 HC RX 637 (ALT 250 FOR IP): Performed by: INTERNAL MEDICINE

## 2023-01-31 PROCEDURE — 6360000002 HC RX W HCPCS: Performed by: NURSE PRACTITIONER

## 2023-01-31 PROCEDURE — 94660 CPAP INITIATION&MGMT: CPT

## 2023-01-31 PROCEDURE — 85610 PROTHROMBIN TIME: CPT

## 2023-01-31 PROCEDURE — 82962 GLUCOSE BLOOD TEST: CPT

## 2023-01-31 PROCEDURE — 89051 BODY FLUID CELL COUNT: CPT

## 2023-01-31 PROCEDURE — 6360000002 HC RX W HCPCS: Performed by: INTERNAL MEDICINE

## 2023-01-31 PROCEDURE — 80048 BASIC METABOLIC PNL TOTAL CA: CPT

## 2023-01-31 PROCEDURE — 6370000000 HC RX 637 (ALT 250 FOR IP): Performed by: NURSE PRACTITIONER

## 2023-01-31 RX ORDER — POTASSIUM CHLORIDE 20 MEQ/1
40 TABLET, EXTENDED RELEASE ORAL ONCE
Status: COMPLETED | OUTPATIENT
Start: 2023-01-31 | End: 2023-01-31

## 2023-01-31 RX ORDER — POTASSIUM CHLORIDE 20 MEQ/1
40 TABLET, EXTENDED RELEASE ORAL 2 TIMES DAILY WITH MEALS
Status: COMPLETED | OUTPATIENT
Start: 2023-01-31 | End: 2023-02-01

## 2023-01-31 RX ADMIN — PHYTONADIONE 5 MG: 10 INJECTION, EMULSION INTRAMUSCULAR; INTRAVENOUS; SUBCUTANEOUS at 15:49

## 2023-01-31 RX ADMIN — IPRATROPIUM BROMIDE AND ALBUTEROL SULFATE 3 ML: .5; 2.5 SOLUTION RESPIRATORY (INHALATION) at 18:11

## 2023-01-31 RX ADMIN — CARBIDOPA AND LEVODOPA 1 TABLET: 50; 200 TABLET, EXTENDED RELEASE ORAL at 15:14

## 2023-01-31 RX ADMIN — IPRATROPIUM BROMIDE AND ALBUTEROL SULFATE 3 ML: .5; 2.5 SOLUTION RESPIRATORY (INHALATION) at 06:07

## 2023-01-31 RX ADMIN — ROPINIROLE HYDROCHLORIDE 1 MG: 1 TABLET, FILM COATED ORAL at 21:23

## 2023-01-31 RX ADMIN — CARBIDOPA AND LEVODOPA 1 TABLET: 50; 200 TABLET, EXTENDED RELEASE ORAL at 09:45

## 2023-01-31 RX ADMIN — POTASSIUM CHLORIDE 40 MEQ: 1500 TABLET, EXTENDED RELEASE ORAL at 15:11

## 2023-01-31 RX ADMIN — IPRATROPIUM BROMIDE AND ALBUTEROL SULFATE 3 ML: .5; 2.5 SOLUTION RESPIRATORY (INHALATION) at 09:46

## 2023-01-31 RX ADMIN — MIRTAZAPINE 7.5 MG: 15 TABLET, FILM COATED ORAL at 21:23

## 2023-01-31 RX ADMIN — ARFORMOTEROL TARTRATE 15 MCG: 15 SOLUTION RESPIRATORY (INHALATION) at 06:07

## 2023-01-31 RX ADMIN — ANTI-FUNGAL POWDER MICONAZOLE NITRATE TALC FREE: 1.42 POWDER TOPICAL at 21:22

## 2023-01-31 RX ADMIN — POTASSIUM CHLORIDE 40 MEQ: 20 TABLET, EXTENDED RELEASE ORAL at 15:17

## 2023-01-31 RX ADMIN — ANTI-FUNGAL POWDER MICONAZOLE NITRATE TALC FREE: 1.42 POWDER TOPICAL at 09:42

## 2023-01-31 RX ADMIN — SUCRALFATE 1 G: 1 TABLET ORAL at 18:15

## 2023-01-31 RX ADMIN — SUCRALFATE 1 G: 1 TABLET ORAL at 09:40

## 2023-01-31 RX ADMIN — MIDODRINE HYDROCHLORIDE 5 MG: 5 TABLET ORAL at 18:15

## 2023-01-31 RX ADMIN — SUCRALFATE 1 G: 1 TABLET ORAL at 21:23

## 2023-01-31 RX ADMIN — SERTRALINE 50 MG: 50 TABLET, FILM COATED ORAL at 09:40

## 2023-01-31 RX ADMIN — BUDESONIDE 500 MCG: 0.5 SUSPENSION RESPIRATORY (INHALATION) at 06:07

## 2023-01-31 RX ADMIN — Medication 10 ML: at 09:43

## 2023-01-31 RX ADMIN — IPRATROPIUM BROMIDE AND ALBUTEROL SULFATE 3 ML: .5; 2.5 SOLUTION RESPIRATORY (INHALATION) at 13:33

## 2023-01-31 RX ADMIN — ATORVASTATIN CALCIUM 20 MG: 20 TABLET, FILM COATED ORAL at 21:23

## 2023-01-31 RX ADMIN — CARBIDOPA AND LEVODOPA 1 TABLET: 50; 200 TABLET, EXTENDED RELEASE ORAL at 21:23

## 2023-01-31 RX ADMIN — PANTOPRAZOLE SODIUM 40 MG: 40 TABLET, DELAYED RELEASE ORAL at 06:13

## 2023-01-31 RX ADMIN — SUCRALFATE 1 G: 1 TABLET ORAL at 14:04

## 2023-01-31 RX ADMIN — Medication 10 ML: at 21:23

## 2023-01-31 RX ADMIN — BUDESONIDE 500 MCG: 0.5 SUSPENSION RESPIRATORY (INHALATION) at 18:11

## 2023-01-31 RX ADMIN — ARFORMOTEROL TARTRATE 15 MCG: 15 SOLUTION RESPIRATORY (INHALATION) at 18:11

## 2023-01-31 RX ADMIN — MONTELUKAST SODIUM 10 MG: 10 TABLET, FILM COATED ORAL at 21:23

## 2023-01-31 RX ADMIN — ROPINIROLE HYDROCHLORIDE 1 MG: 1 TABLET, FILM COATED ORAL at 14:04

## 2023-01-31 RX ADMIN — ROPINIROLE HYDROCHLORIDE 1 MG: 1 TABLET, FILM COATED ORAL at 09:40

## 2023-01-31 ASSESSMENT — PAIN SCALES - GENERAL: PAINLEVEL_OUTOF10: 0

## 2023-01-31 NOTE — PROGRESS NOTES
Physical Therapy        [id number]    Patient unavailable for physical therapy treatment due to patient declined therapy at this time due to fatigue. Patient currently on bipap. Will attempt session at later time/date.      Andressa Aguilar, PTA  #[id number]

## 2023-01-31 NOTE — PROGRESS NOTES
[facility] MD Lazara Gallegos MD Chauncey Glee, MD Elyse Horner, BRADLEY Devlin, NUNU Weaver CNP  Progress Note    1/30/2023    SUBJECTIVE:   1/20: Feeling little better today. Denies new complaint. Still tachypnea, though denies dyspnea no cp/palp Appetite ok ROS otherwise unremarkable    1//21 seen in her room on o2/nc bp stable weak poor po intake   2+ edema in LE     1/22 charts reviewed . On bipap    1/23 : on o2/nc . Still look hypervolemic     1/24 seen in her room comfortable o2/nc . Still with LE edema which seems to be multifactorial and will likely need to accept having some edema on board     1/25 sitting on edge of the bed working with pt . Still with considerable edema in LEs   BP stable     1/26 seen in her room reports of SOB with minimal activity , LE edema still signficant . 1/27 good UO On 5 L/o2/nc  Still with sob with activity Tachycardiac with low functional capacity     1/28: Asleep, resting and breathing comfortably on nasal cannula. Remains edematous. Ongoing fatigue and generalized weakness. 1/29: Hypotensive last evening, Bumex drip stopped, IV normal saline bolus administered to effect. Breathing little more easily though still on AVAPS. To be downgraded to CPAP shortly. Thirsty. Still markedly edematous. 1/30: Somnolent, though arousable. On CPAP. Ongoing fatigue and malaise with generalized weakness    PROBLEM LIST:    Principal Problem:    Acute on chronic respiratory failure with hypoxia and hypercapnia (HCC)  Resolved Problems:    * No resolved hospital problems. *         DIET:    ADULT DIET; Regular; 3 carb choices (45 gm/meal); Low Fat/Low Chol/High Fiber/2 gm Na; Moderately Thick (Honey); 1800 ml  ADULT ORAL NUTRITION SUPPLEMENT; Dinner;  Other Oral Supplement; Gelatein 20     MEDS (scheduled):    [Held by provider] dilTIAZem  60 mg Oral 3 times per day    sucralfate  1 g Oral 4x Daily    rOPINIRole 1 mg Oral TID    atorvastatin  20 mg Oral Nightly    ipratropium-albuterol  1 vial Inhalation 4x Daily    [Held by provider] insulin glargine  13 Units SubCUTAneous BID    [Held by provider] LORazepam  0.5 mg Oral Nightly    metoprolol succinate  25 mg Oral BID    insulin lispro  0-4 Units SubCUTAneous TID WC    insulin lispro  0-4 Units SubCUTAneous Nightly    budesonide  0.5 mg Nebulization BID    sodium chloride flush  10 mL IntraVENous 2 times per day    miconazole   Topical BID    arformoterol tartrate  15 mcg Nebulization BID    pantoprazole  40 mg Oral QAM AC    [Held by provider] apixaban  5 mg Oral BID    mirtazapine  7.5 mg Oral Nightly    [Held by provider] aspirin  81 mg Oral Daily    carbidopa-levodopa  1 tablet Oral TID    sertraline  50 mg Oral Daily    montelukast  10 mg Oral Nightly    [Held by provider] midodrine  5 mg Oral TID WC       MEDS (infusions):   sodium chloride      dextrose         MEDS (prn):  sodium chloride, loperamide, sodium chloride flush, sodium chloride, promethazine **OR** ondansetron, polyethylene glycol, acetaminophen **OR** acetaminophen, glucose, dextrose bolus **OR** dextrose bolus, glucagon (rDNA), dextrose    PHYSICAL EXAM:     Patient Vitals for the past 24 hrs:   BP Temp Temp src Pulse Resp SpO2 Weight   01/30/23 2025 93/61 97.9 °F (36.6 °C) Oral 98 22 96 % --   01/30/23 1822 (!) 104/56 97.8 °F (36.6 °C) Oral (!) 108 -- 96 % --   01/30/23 1431 -- -- -- -- 23 -- --   01/30/23 0951 -- -- -- -- 22 -- --   01/30/23 0949 -- -- -- -- -- 98 % --   01/30/23 0941 97/66 97.7 °F (36.5 °C) Axillary (!) 110 20 98 % --   01/30/23 0619 -- -- -- -- 23 -- --   01/30/23 0606 (!) 100/57 98.2 °F (36.8 °C) Oral 93 20 100 % --   01/30/23 0220 -- -- -- -- 21 -- --   01/30/23 0100 -- -- -- -- -- -- 257 lb (116.6 kg)   01/29/23 2217 -- -- -- -- 26 -- --   @      Intake/Output Summary (Last 24 hours) at 1/30/2023 2042  Last data filed at 1/30/2023 0518  Gross per 24 hour   Intake --   Output 600 ml   Net -600 ml         Wt Readings from Last 3 Encounters:   01/30/23 257 lb (116.6 kg)   01/16/23 259 lb (117.5 kg)   01/16/23 259 lb (117.5 kg)     Age-appropriate morbidly obese white woman, though easily arousable, no apparent distress  NC/AT EOMI sclera conjunctive are clear and anicteric mucous membranes are moist  Neck soft supple trachea midline  Coarse breath sounds with crackles on the right, mildly prolonged expiratory phase but no wheeze. Arguably a little more clear than yesterday  Regular rhythm normal S1 and S2  Abdomen soft nontender nondistended normal active bowel sounds. Abdominal pannus has substantial edema  Distal extremities, thighs, sacrum have substantial chronic type nonpitting edema, though no pitting edema  Pulses 1+ x4  Moves all 4 extremities  Cranial nerves II through XII grossly intact  Awake, alert, interactive and appropriate  Skin tone consistent with chronic venous stasis distally      DATA:    Recent Labs     01/30/23  0500   WBC 7.2   HGB 7.7*   HCT 26.6*   MCV 89.3   PLT 75*       Recent Labs     01/28/23  0907 01/29/23  0817 01/30/23  0500   * 145 148*   K 4.2 3.3* 3.9   CL 99 98 99   CO2 38* 40* 37*   MG  --  1.7 1.6   PHOS  --  4.8* 5.0*   BUN 58* 61* 64*   CREATININE 2.3* 2.3* 2.3*   ALT  --   --  <5   AST  --   --  8   BILITOT  --   --  0.3   ALKPHOS  --   --  60       Lab Results   Component Value Date    LABPROT 0.5 (H) 01/18/2023    LABPROT 0.5 01/18/2023     On CT no hydro/signs of obstruction     Urine studies  U Na 21, U Cl 23 U prot:cr ratio 0.5    ASSESSMENT / RECOMMENDATIONS:       Assessment  1. Acute kidney injury urine lytes support decrease effective volume     2. CKD stage III, Baseline creatinine 1.4 to 1.7 mg/dL, secondary to diabetic nephropathy and renal microvascular atherosclerotic disease  0.5 g proteinuria     3. Anemia due to CKD, phlebotomy associated prolonged hospitalization     4.   Acute hypercapnic and hypoxic respiratory failure due to COPD exacerbation and pneumonia. Does not appear hypervolemic. 5.  Morbid obesity, BMI 50.6 kg per metered square     6. Edema/anasarca, chronic, multifactorial, due to venous insufficiency in the setting of morbid obesity, relative immobility, likely diastolic dysfunction though it was \"indeterminate\" on echo, the does have pulmonary hypertension, mild right-sided heart failure    7. Hypotension     Remains hypervolemic. Difficult problem as aggressive diuresis was worsening her hypotension  Creatinine stable above baseline    Chest x-ray demonstrates right hemithorax opacification consistent with pleural effusion.   For thoracentesis tomorrow 1/31    Recommendations  Continue to hold bumex drip for now  Compression stockings   follow labs, UO  Out of bed to chair as able  Continue supportive care  Once thoracentesis completed, consider resuming diuretic therapy  Right-sided thoracentesis 1/31    Electronically signed by Bob Casillas MD on 1/30/2023

## 2023-01-31 NOTE — PROGRESS NOTES
OCCUPATIONAL THERAPY TREATMENT NOTE     [address] [facility]   [facility] [address]        Date:2023  Patient Name: Andrea Rodriguez  MRN: [id number]  : 1950  Room: [address] [place] [address] McKenzie, OTR/L; [id number]        Referring Provider and Orders/Date:    OT eval and treat  Start:  23,   End:  23,   ONE TIME,   Standing Count:  1 Occurrences,   Edenilson Shane MD         Diagnosis:   1. Atrial fibrillation with rapid ventricular response ([address]    2. Acute on chronic respiratory failure with hypoxia and hypercapnia (HCC)    3. Recurrent right pleural effusion    4. NSTEMI (non-ST elevated myocardial infarction) ([address]    5.  Hypotension, unspecified hypotension type          Surgery: none      Pertinent Medical History:                Past Medical History:   Diagnosis Date    A-fib ([address]      Acute on chronic congestive heart failure (HCC)      Anxiety      Asthma      CAD (coronary artery disease) 2016    Cancer ([address]  breast ca 2006     right lumpectomy    Chronic kidney disease       nephrolithiasis    COPD exacerbation ([address] 10/12/2022    Depression      Diabetes mellitus ([address]      H/O mammogram      Hx MRSA infection       toe infection 2012    Hyperlipidemia      Hypertension      Lateral epicondylitis      Morbid obesity (HCC)      SERENA on CPAP      Oxygen dependent      Parkinson's disease ([address]      Tubal ligation status                      Past Surgical History:   Procedure Laterality Date    BREAST LUMPECTOMY        BREAST REDUCTION SURGERY        CARDIAC CATHETERIZATION   2014     Dr. Nirmala Melo   2022     Dr. Rik Wright   7/29/15    CT GUIDED CHEST TUBE   2022     CT GUIDED CHEST TUBE 2022 Caron Espinoza MD [facility]    ENDOSCOPY, COLON, DIAGNOSTIC   7/19/15    GALLBLADDER SURGERY        LUMBAR LAMINECTOMY TOE AMPUTATION        TONSILLECTOMY        UPPER GASTROINTESTINAL ENDOSCOPY        UPPER GASTROINTESTINAL ENDOSCOPY N/A 7/19/2022     EGD ESOPHAGOGASTRODUODENOSCOPY performed by Tiffanie Bates MD at [facility] ENDOSCOPY        Precautions:  Fall Risk, bipap vs 6L, genao, contact for VRE    Recommended placement: subacute    Assessment of current deficits     [x] Functional mobility           [x]ADLs           [x] Strength                  []Cognition     [x] Functional transfers         [x] IADLs         [x] Safety Awareness   [x]Endurance     [] Fine Coordination                        [x] Balance      [] Vision/perception   []Sensation       [x]Gross Motor Coordination            [] ROM           [] Delirium                   [] Motor Control      OT PLAN OF CARE   OT POC based on physician orders, patient diagnosis and results of clinical assessment     Frequency/Duration 1-3 days/wk for 2 weeks PRN   Specific OT Treatment Interventions to include:   * Instruction/training on adapted ADL techniques and AE recommendations to increase functional independence within precautions       * Training on energy conservation strategies, correct breathing pattern and techniques to improve independence/tolerance for self-care routine  * Functional transfer/mobility training/DME recommendations for increased independence, safety, and fall prevention  * Patient/Family education to increase follow through with safety techniques and functional independence  * Recommendation of environmental modifications for increased safety with functional transfers/mobility and ADLs  * Therapeutic exercise to improve motor endurance, ROM, and functional strength for ADLs/functional transfers  * Therapeutic activities to facilitate/challenge dynamic balance, stand tolerance for increased safety and independence with ADLs  * Therapeutic activities to facilitate gross/fine motor skills for increased independence with ADLs  * Neuro-muscular re-education: facilitation of righting/equilibrium reactions, midline orientation, scapular stability/mobility, normalization of muscle tone, and facilitation of volitional active controled movement  * Positioning to improve skin integrity, interaction with environment and functional independence      Recommended Adaptive Equipment/DME:  TBD       Home Living: Pt is a long term resident of a SNF and plans to return at [facility]. Bathroom setup: roll in shower chair; grab bars by the toilet and 3:1 over top for safety. DME owned: wc and walker      Prior Level of Function: assist with ADLs , dep with IADLs; ambulated with wc mostly, but transferring with ww and assist   Driving: no   Occupation: none   Enjoys: reading, socializing, phone-games     Pain Level: none  Cognition: A&O: 3/4 not situation; Follows 2 step directions-limited with O2 and bipap              Memory:  fair              Sequencing:  fair              Problem solving:  fair-              Judgement/safety:  fair-     AM-[facility] Daily Activity Inpatient   How much help for putting on and taking off regular lower body clothing?: Total  How much help for Bathing?: A Lot  How much help for Toileting?: Total  How much help for putting on and taking off regular upper body clothing?: A Lot  How much help for taking care of personal grooming?: A Little  How much help for eating meals?: A Little  AM-[facility] Inpatient Daily Activity Raw Score: 12  AM-[facility] Inpatient ADL T-Scale Score : 30.6  ADL Inpatient CMS 0-100% Score: 66.57  ADL Inpatient CMS G-Code Modifier : CL     Functional Assessment:      Initial Eval Status  Date: 1/20/2023   Treatment Status  Date: 1/31/23 STGs = LTGs  Time frame: 10-14 days   Feeding Minimal Assist with modified diet, O2 limitations and fatigue. N/t set up per last report   Set up   Grooming Moderate Assist with limitations due to bipap.   Min A overall with face/hand wash  Stand by Assist    UB Dressing Maximal Assist with gown management from supine Mod A with line management from supine level. Moderate Assist    LB Dressing Dependent with socks and heel protectors from supine Dep with socks from supine. Maximal Assist    Bathing Maximal Assist from supine level N/t Moderate Assist    Toileting Dependent with genao management N/t Dep per last report  Maximal Assist    Bed Mobility  Pt had just returned to supine after sitting EOB with PT-reported max A with dropping vitals. Body mobility and positioning with max A for comfort and upright posture Pt with low O2 on arrival and placed on bipap. Requesting to remain in supine due to fatigue. Supine to sit: Minimal Assist   Sit to supine: Minimal Assist    Functional Transfers NT due to pt overall debility, decreased activity tolerance, balance deficits, safety and fall risk. Dropping O2 with supine tasks Nt due to pt declining  Moderate Assist    Functional Mobility Not appropriate at time of eval. To re-assess at a later time if appropriate. NT Not appropriate at time of eval. To re-assess at a later time if appropriate. Balance Sitting:     Static:  NT    Dynamic:NT  Standing: NT  Pt refusal Sitting:     Static:  fair    Dynamic:fair-  Standing: poor+   Activity Tolerance Vitals with activity: cont bipap 88-90%; 151/84 HR 67; 129/70 with ADLs, 88% HR 70; poor tolerance to therapy overall with pt very fatigued and falling asleep. Unable to tolerate sitting with OT session.   Fair- pt on Bipap due to per pt request upon arrival pt SPO2 95% at rest   92% during UE ex's  Increase sitting tolerance for >15min with stable vital signs for carry over into toileting, functional tranfers and indep in ADLs   Visual/  Perceptual Glasses: not Present; WFL     Reports change in vision since admission: No      NA     Pt supine in bed, engaged in B UE ex's 3 x 10 reps in all planes; edema management ex's 1 x 10 reps fist pumps, elbow flexion/extension, shoulder flexion/extension focusing on UE strength/endurance, edema management to increase independence with transfers/mobility and ADL tasks. Comments: Upon arrival pt supine in bed, agreeable to therapy session. Pt educated with regards to bed mobility, UE ROM ex's, joint protection, AROM, edema management, grooming tasks, importance of increased activity. At end of session pt supine in bed,  all lines and tubes intact, call light within reach. Pt has made fair-  progress towards set goals.    Continue with current plan of care      Treatment Time In:1335            Treatment Time Out: 1400                Treatment Charges: Mins Units   Ther Ex  98960 15 1   Manual Therapy [id number]     Thera Activities [id number]      ADL/Home Mgt 71334 10 1   Neuro Re-ed [id number]     Group Therapy      Orthotic manage/training  69239     Non-Billable Time     Total Timed Treatment [address] GELLER/L [id number]

## 2023-01-31 NOTE — PLAN OF CARE
Problem: Discharge Planning  Goal: Discharge to home or other facility with appropriate resources  Outcome: Progressing     Problem: Respiratory - Adult  Goal: Achieves optimal ventilation and oxygenation  Outcome: Progressing     Problem: Cardiovascular - Adult  Goal: Maintains optimal cardiac output and hemodynamic stability  Outcome: Progressing     Problem: Cardiovascular - Adult  Goal: Absence of cardiac dysrhythmias or at baseline  Outcome: Progressing     Problem: Genitourinary - Adult  Goal: Absence of urinary retention  Outcome: Progressing     Problem: Genitourinary - Adult  Goal: Urinary catheter remains patent  Outcome: Progressing     Problem: Chronic Conditions and Co-morbidities  Goal: Patient's chronic conditions and co-morbidity symptoms are monitored and maintained or improved  Outcome: Progressing     Problem: Skin/Tissue Integrity  Goal: Absence of new skin breakdown  Description: 1. Monitor for areas of redness and/or skin breakdown  2. Assess vascular access sites hourly  3. Every 4-6 hours minimum:  Change oxygen saturation probe site  4. Every 4-6 hours:  If on nasal continuous positive airway pressure, respiratory therapy assess nares and determine need for appliance change or resting period.   Outcome: Progressing     Problem: Safety - Adult  Goal: Free from fall injury  Outcome: Progressing     Problem: ABCDS Injury Assessment  Goal: Absence of physical injury  Outcome: Progressing     Problem: Pain  Goal: Verbalizes/displays adequate comfort level or baseline comfort level  Outcome: Progressing     Problem: Nutrition Deficit:  Goal: Optimize nutritional status  Outcome: Progressing

## 2023-01-31 NOTE — PROGRESS NOTES
Department of Internal Medicine  General Internal Medicine  Attending Progress Note  Chief Complaint   Patient presents with    Respiratory Distress     Normally on 3L NC, came in on CPAP, given total of 50mcg push dose epi for low BP by EMS     SUBJECTIVE:    Feeling less sob today. Onn high flow  No chest pain. No diarrhea.      OBJECTIVE      Medications    Current Facility-Administered Medications: sodium chloride (OCEAN, BABY AYR) 0.65 % nasal spray 1 spray, 1 spray, Each Nostril, PRN  [Held by provider] dilTIAZem (CARDIZEM) tablet 60 mg, 60 mg, Oral, 3 times per day  sucralfate (CARAFATE) tablet 1 g, 1 g, Oral, 4x Daily  rOPINIRole (REQUIP) tablet 1 mg, 1 mg, Oral, TID  atorvastatin (LIPITOR) tablet 20 mg, 20 mg, Oral, Nightly  ipratropium-albuterol (DUONEB) nebulizer solution 3 mL, 1 vial, Inhalation, 4x Daily  [Held by provider] insulin glargine (LANTUS) injection vial 13 Units, 13 Units, SubCUTAneous, BID  loperamide (IMODIUM) capsule 2 mg, 2 mg, Oral, 4x Daily PRN  [Held by provider] LORazepam (ATIVAN) tablet 0.5 mg, 0.5 mg, Oral, Nightly  metoprolol succinate (TOPROL XL) extended release tablet 25 mg, 25 mg, Oral, BID  insulin lispro (HUMALOG) injection vial 0-4 Units, 0-4 Units, SubCUTAneous, TID WC  insulin lispro (HUMALOG) injection vial 0-4 Units, 0-4 Units, SubCUTAneous, Nightly  budesonide (PULMICORT) nebulizer suspension 500 mcg, 0.5 mg, Nebulization, BID  sodium chloride flush 0.9 % injection 10 mL, 10 mL, IntraVENous, 2 times per day  sodium chloride flush 0.9 % injection 10 mL, 10 mL, IntraVENous, PRN  0.9 % sodium chloride infusion, , IntraVENous, PRN  promethazine (PHENERGAN) tablet 12.5 mg, 12.5 mg, Oral, Q6H PRN **OR** ondansetron (ZOFRAN) injection 4 mg, 4 mg, IntraVENous, Q6H PRN  polyethylene glycol (GLYCOLAX) packet 17 g, 17 g, Oral, Daily PRN  acetaminophen (TYLENOL) tablet 650 mg, 650 mg, Oral, Q6H PRN **OR** acetaminophen (TYLENOL) suppository 650 mg, 650 mg, Rectal, Q6H PRN  miconazole (MICOTIN) 2 % powder, , Topical, BID  arformoterol tartrate (BROVANA) nebulizer solution 15 mcg, 15 mcg, Nebulization, BID  pantoprazole (PROTONIX) tablet 40 mg, 40 mg, Oral, QAM AC  glucose chewable tablet 16 g, 4 tablet, Oral, PRN  dextrose bolus 10% 125 mL, 125 mL, IntraVENous, PRN **OR** dextrose bolus 10% 250 mL, 250 mL, IntraVENous, PRN  glucagon (rDNA) injection 1 mg, 1 mg, SubCUTAneous, PRN  dextrose 10 % infusion, , IntraVENous, Continuous PRN  [Held by provider] apixaban (ELIQUIS) tablet 5 mg, 5 mg, Oral, BID  mirtazapine (REMERON) tablet 7.5 mg, 7.5 mg, Oral, Nightly  [Held by provider] aspirin EC tablet 81 mg, 81 mg, Oral, Daily  carbidopa-levodopa (SINEMET CR)  MG per extended release tablet 1 tablet, 1 tablet, Oral, TID  sertraline (ZOLOFT) tablet 50 mg, 50 mg, Oral, Daily  montelukast (SINGULAIR) tablet 10 mg, 10 mg, Oral, Nightly  [Held by provider] midodrine (PROAMATINE) tablet 5 mg, 5 mg, Oral, TID WC  Physical    VITALS:  BP (!) 90/47   Pulse 75   Temp 98.8 °F (37.1 °C) (Oral)   Resp 19   Ht 5' (1.524 m)   Wt 257 lb (116.6 kg) Comment: Patient placed on new bed, weight is off from previous bed scale. SpO2 100%   BMI 50.19 kg/m²   No acute distress moist membranes   Normocephalic, without obvious abnormality, atraumatic, external ears without lesions,   Neck supple no cervical lymphadenopathy  Heart has a regular rate and rhythm no murmur  Lungs are clear to auscultation bilaterally with equal movements. Abdomen is soft nontender nondistended no rebound or guarding. No significant peripheral   Anasarca  good peripheral perfusion. No significant rashes or new skin lesions. No new focality on neuro exam which is overall grossly intact.   Affect and mood seem to be appropriate     Data    CBC:   Lab Results   Component Value Date/Time    WBC 7.2 01/30/2023 05:00 AM    RBC 2.98 01/30/2023 05:00 AM    HGB 7.7 01/30/2023 05:00 AM    HCT 26.6 01/30/2023 05:00 AM    MCV 89.3 01/30/2023 05:00 AM    MCH 25.8 01/30/2023 05:00 AM    MCHC 28.9 01/30/2023 05:00 AM    RDW 16.2 01/30/2023 05:00 AM    PLT 75 01/30/2023 05:00 AM    MPV 11.6 01/30/2023 05:00 AM     BMP:    Lab Results   Component Value Date/Time     01/31/2023 06:51 AM    K 3.0 01/31/2023 06:51 AM    K 4.8 01/20/2023 05:07 AM    CL 98 01/31/2023 06:51 AM    CO2 39 01/31/2023 06:51 AM    BUN 61 01/31/2023 06:51 AM    LABALBU 3.0 01/30/2023 05:00 AM    CREATININE 2.3 01/31/2023 06:51 AM    CALCIUM 8.4 01/31/2023 06:51 AM    GFRAA >60 10/16/2022 09:20 AM    LABGLOM 22 01/31/2023 06:51 AM    GLUCOSE 111 01/31/2023 06:51 AM       ASSESSMENT AND PLAN    Summary of stay: This is a 67 y.o. female  has a past medical history of A-fib ([address] Acute on chronic congestive heart failure ([address] Anxiety, Asthma, CAD (coronary artery disease), Cancer ([address] Chronic kidney disease, COPD exacerbation ([address] Depression, Diabetes mellitus ([address] H/O mammogram, Hx MRSA infection, Hyperlipidemia, Hypertension, Lateral epicondylitis, Morbid obesity ([address] SERENA on CPAP, Oxygen dependent, Parkinson's disease ([address] and Tubal ligation status. presented with SOB, hypotension  for last few days prior to arrival to the hospital. SOB is associated with some cough, nonproductive but no fever or chills reported. Initially SOB was mild, intermittent but gradually getting more persistent. Started gradually. Exacerbated by general activity or exertion. Relieved only partially by rest. In ED patient was found to be hypotensive with Afib RVR and respiratory failure and was placed on BIPAP. BP improved after midodrine dose. The patient was seen and examined at bedside, appears alert and awake with no acute distress and is able to answer simple  questions.  On direct questioning, patient denied any  resting ongoing chest pain, hemoptysis, productive cough, fever, ongoing palpitation, active abdominal pain, hematemesis, rectal bleeding, blessign, hematuria, any other  and GI complaints, and any new focal neuro deficits     Acute on chronic respiratory failure with hypoxia and hypercapnia & Obesity hypoventilation syndrome: s/p Bumex Drip per Nephro. Continue CPAP and wean off as recommended by pulm. Congestive heart failure: decompensated. Anasarca. Diuresis per nephro. Thoracentesis per pulmonary  Atrial Fibrillation: On eliquis  UTI: vre on cx s/p zyvox stopped by ID  Acute on chronic Kidney disease:  Renal function is stable with crt of 2.3  Dm type 2: Insulin held due to Hypoglycemia   Parkinson disease:  sinemet cr  Pulm Htn: Pulm following  Low K: 1/31: upplement and recheck in am  DVT prophylaxis: Lovenox  Full code.  Palliative care consult on going  Disposition: community skilled

## 2023-01-31 NOTE — PROGRESS NOTES
Pulmonary/Critical Care Progress Note    We are following patient for acute respiratory failure, complete opacification of right hemithorax secondary to large pleural effusion, severe COPD, hypokalemia, cor pulmonale, obstructive sleep apnea, atrial fibrillation, obesity hypoventilation syndrome, SHANTANU probably superimposed on CKD, diabetes mellitus, question diastolic dysfunction, Parkinson's disease    SUBJECTIVE:  The patient cannot have a thoracentesis today because her pro time/INR are elevated. Therefore, we will give her a dose of vitamin K 5 mg. Her apixaban has been held since yesterday. Hopefully, she will be adequate to have a thoracentesis tomorrow in terms of her clotting times. As long as she is on the BiPAP and or Airvo, she is comfortable. Hopefully, she will be much more comfortable having had a large pleural effusion drained, rescheduled for tomorrow.     MEDICATIONS:   potassium chloride  40 mEq Oral BID WC    [Held by provider] dilTIAZem  60 mg Oral 3 times per day    sucralfate  1 g Oral 4x Daily    rOPINIRole  1 mg Oral TID    atorvastatin  20 mg Oral Nightly    ipratropium-albuterol  1 vial Inhalation 4x Daily    [Held by provider] insulin glargine  13 Units SubCUTAneous BID    [Held by provider] LORazepam  0.5 mg Oral Nightly    metoprolol succinate  25 mg Oral BID    insulin lispro  0-4 Units SubCUTAneous TID WC    insulin lispro  0-4 Units SubCUTAneous Nightly    budesonide  0.5 mg Nebulization BID    sodium chloride flush  10 mL IntraVENous 2 times per day    miconazole   Topical BID    arformoterol tartrate  15 mcg Nebulization BID    pantoprazole  40 mg Oral QAM AC    [Held by provider] apixaban  5 mg Oral BID    mirtazapine  7.5 mg Oral Nightly    [Held by provider] aspirin  81 mg Oral Daily    carbidopa-levodopa  1 tablet Oral TID    sertraline  50 mg Oral Daily    montelukast  10 mg Oral Nightly    midodrine  5 mg Oral TID       sodium chloride      dextrose       sodium chloride, loperamide, sodium chloride flush, sodium chloride, promethazine **OR** ondansetron, polyethylene glycol, acetaminophen **OR** acetaminophen, glucose, dextrose bolus **OR** dextrose bolus, glucagon (rDNA), dextrose      REVIEW OF SYSTEMS:  Constitutional: Denies fever, weight loss, night sweats, and fatigue  Skin: Denies pigmentation, dark lesions, and rashes   HEENT: Denies hearing loss, tinnitus, ear drainage, epistaxis, sore throat, and hoarseness. Cardiovascular: Denies palpitations, chest pain, and chest pressure. Respiratory: Denies cough, dyspnea at rest, hemoptysis, apnea, and choking. Gastrointestinal: Denies nausea, vomiting, poor appetite, diarrhea, heartburn or reflux  Genitourinary: Denies dysuria, frequency, urgency or hematuria  Musculoskeletal: Denies myalgias, muscle weakness, and bone pain  Neurological: Denies dizziness, vertigo, headache, and focal weakness  Psychological: Denies anxiety and depression  Endocrine: Denies heat intolerance and cold intolerance  Hematopoietic/Lymphatic: Denies bleeding problems and blood transfusions    OBJECTIVE:  Vitals:    01/31/23 0937   BP: (!) 90/47   Pulse: 75   Resp: 19   Temp: 98.8 °F (37.1 °C)   SpO2: 100%     FiO2 : 60 %  O2 Flow Rate (L/min): 60 L/min  O2 Device: Heated high flow cannula    PHYSICAL EXAM:  Constitutional: No fever, chills, diaphoresis  Skin: Chronic venous stasis changes lower extremities  HEENT: Moist mucous membranes  Neck: No JVD, lymphadenopathy, thyromegaly  Cardiovascular: S1, S2 irregular. No S3 or rubs present  Respiratory: Decreased breath sounds over right hemithorax consistent with large pleural effusion -left side is clear  Gastrointestinal: Soft, markedly obese, nontender  Genitourinary: No CVA tenderness  Extremities: Chronic venous stasis changes. Edema has resolved in both lower extremities  Neurological: Awake, alert, oriented x3.   No evidence of focal motor or sensory deficits  Psychological: Appropriate affect    LABS:  WBC   Date Value Ref Range Status   01/30/2023 7.2 4.5 - 11.5 E9/L Final   01/26/2023 10.6 4.5 - 11.5 E9/L Final   01/22/2023 7.1 4.5 - 11.5 E9/L Final     Hemoglobin   Date Value Ref Range Status   01/30/2023 7.7 (L) 11.5 - 15.5 g/dL Final   01/26/2023 9.2 (L) 11.5 - 15.5 g/dL Final   01/22/2023 8.2 (L) 11.5 - 15.5 g/dL Final     Hematocrit   Date Value Ref Range Status   01/30/2023 26.6 (L) 34.0 - 48.0 % Final   01/26/2023 34.6 34.0 - 48.0 % Final   01/22/2023 30.8 (L) 34.0 - 48.0 % Final     MCV   Date Value Ref Range Status   01/30/2023 89.3 80.0 - 99.9 fL Final   01/26/2023 94.3 80.0 - 99.9 fL Final   01/22/2023 93.1 80.0 - 99.9 fL Final     Platelets   Date Value Ref Range Status   01/30/2023 75 (L) 130 - 450 E9/L Final   01/26/2023 169 130 - 450 E9/L Final   01/22/2023 189 130 - 450 E9/L Final     Sodium   Date Value Ref Range Status   01/31/2023 146 132 - 146 mmol/L Final   01/30/2023 148 (H) 132 - 146 mmol/L Final   01/29/2023 145 132 - 146 mmol/L Final     Potassium   Date Value Ref Range Status   01/31/2023 3.0 (L) 3.5 - 5.0 mmol/L Final   01/30/2023 3.9 3.5 - 5.0 mmol/L Final   01/29/2023 3.3 (L) 3.5 - 5.0 mmol/L Final     Potassium reflex Magnesium   Date Value Ref Range Status   01/20/2023 4.8 3.5 - 5.0 mmol/L Final   01/19/2023 4.9 3.5 - 5.0 mmol/L Final   01/18/2023 5.2 (H) 3.5 - 5.0 mmol/L Final     Chloride   Date Value Ref Range Status   01/31/2023 98 98 - 107 mmol/L Final   01/30/2023 99 98 - 107 mmol/L Final   01/29/2023 98 98 - 107 mmol/L Final     CO2   Date Value Ref Range Status   01/31/2023 39 (H) 22 - 29 mmol/L Final   01/30/2023 37 (H) 22 - 29 mmol/L Final   01/29/2023 40 (H) 22 - 29 mmol/L Final     BUN   Date Value Ref Range Status   01/31/2023 61 (H) 6 - 23 mg/dL Final   01/30/2023 64 (H) 6 - 23 mg/dL Final   01/29/2023 61 (H) 6 - 23 mg/dL Final     Creatinine   Date Value Ref Range Status   01/31/2023 2.3 (H) 0.5 - 1.0 mg/dL Final   01/30/2023 2.3 (H) 0.5 - 1.0 mg/dL Final   01/29/2023 2.3 (H) 0.5 - 1.0 mg/dL Final     Glucose   Date Value Ref Range Status   01/31/2023 111 (H) 74 - 99 mg/dL Final   01/30/2023 104 (H) 74 - 99 mg/dL Final   01/29/2023 99 74 - 99 mg/dL Final     Calcium   Date Value Ref Range Status   01/31/2023 8.4 (L) 8.6 - 10.2 mg/dL Final   01/30/2023 8.3 (L) 8.6 - 10.2 mg/dL Final   01/29/2023 8.3 (L) 8.6 - 10.2 mg/dL Final     Total Protein   Date Value Ref Range Status   01/30/2023 5.0 (L) 6.4 - 8.3 g/dL Final   01/17/2023 6.1 (L) 6.4 - 8.3 g/dL Final   01/16/2023 6.3 (L) 6.4 - 8.3 g/dL Final     Albumin   Date Value Ref Range Status   01/30/2023 3.0 (L) 3.5 - 5.2 g/dL Final   01/17/2023 3.6 3.5 - 5.2 g/dL Final   01/16/2023 3.8 3.5 - 5.2 g/dL Final     Total Bilirubin   Date Value Ref Range Status   01/30/2023 0.3 0.0 - 1.2 mg/dL Final   01/17/2023 0.5 0.0 - 1.2 mg/dL Final   01/16/2023 0.4 0.0 - 1.2 mg/dL Final     Alkaline Phosphatase   Date Value Ref Range Status   01/30/2023 60 35 - 104 U/L Final   01/17/2023 98 35 - 104 U/L Final   01/16/2023 96 35 - 104 U/L Final     AST   Date Value Ref Range Status   01/30/2023 8 0 - 31 U/L Final   01/17/2023 9 0 - 31 U/L Final   01/16/2023 10 0 - 31 U/L Final     ALT   Date Value Ref Range Status   01/30/2023 <5 0 - 32 U/L Final   01/17/2023 <5 0 - 32 U/L Final   01/16/2023 <5 0 - 32 U/L Final     Est, Glom Filt Rate   Date Value Ref Range Status   01/31/2023 22 >=60 mL/min/1.73 Final     Comment:     Pediatric calculator link  [email or web address]/gfr_calculatorped  Effective Oct 3, 2022  These results are not intended for use in patients  <25years of age. eGFR results are calculated without  a race factor using the 2021 CKD-EPI equation. Careful  clinical correlation is recommended, particularly when  comparing to results calculated using previous equations.   The CKD-EPI equation is less accurate in patients with  extremes of muscle mass, extra-renal metabolism of  creatinine, excessive creatinine ingestion, or following  therapy that affects renal tubular secretion. 01/30/2023 22 >=60 mL/min/1.73 Final     Comment:     Pediatric calculator link  [email or web address]. [email or web address]/gfr_[email or web address]  Effective Oct 3, 2022  These results are not intended for use in patients  <25years of age. eGFR results are calculated without  a race factor using the 2021 CKD-EPI equation. Careful  clinical correlation is recommended, particularly when  comparing to results calculated using previous equations. The CKD-EPI equation is less accurate in patients with  extremes of muscle mass, extra-renal metabolism of  creatinine, excessive creatinine ingestion, or following  therapy that affects renal tubular secretion. 01/29/2023 22 >=60 mL/min/1.73 Final     Comment:     Pediatric calculator link  [email or web address]. [email or web address]/[email or web address]_[email or web address]  Effective Oct 3, 2022  These results are not intended for use in patients  <25years of age. eGFR results are calculated without  a race factor using the 2021 CKD-EPI equation. Careful  clinical correlation is recommended, particularly when  comparing to results calculated using previous equations. The CKD-EPI equation is less accurate in patients with  extremes of muscle mass, extra-renal metabolism of  creatinine, excessive creatinine ingestion, or following  therapy that affects renal tubular secretion.        GFR    Date Value Ref Range Status   10/16/2022 >60  Final   10/14/2022 59  Final   10/13/2022 >60  Final     Magnesium   Date Value Ref Range Status   01/30/2023 1.6 1.6 - 2.6 mg/dL Final   01/29/2023 1.7 1.6 - 2.6 mg/dL Final   12/30/2022 2.0 1.6 - 2.6 mg/dL Final     Phosphorus   Date Value Ref Range Status   01/31/2023 4.5 2.5 - 4.5 mg/dL Final   01/30/2023 5.0 (H) 2.5 - 4.5 mg/dL Final   01/29/2023 4.8 (H) 2.5 - 4.5 mg/dL Final     Recent Labs     01/30/23  0530   PH 7.350   PO2 78.6   PCO2 80.6*   HCO3 43.5* BE 15.6*   O2SAT 94.6       RADIOLOGY:  XR CHEST PORTABLE   Final Result   Complete opacification of the right hemithorax related to pleural effusion. Modest contralateral infiltrate and or atelectasis at the left lower chest.         CT ABDOMEN PELVIS WO CONTRAST Additional Contrast? None   Final Result   No nephroureterolithiasis or hydronephrosis. Splenomegaly. Small volume of perihepatic ascites. At least moderate-sized partially imaged right pleural effusion with   bibasilar atelectasis. Several locules of gas which appear to be within the endometrium at the level   of the uterine fundus, of uncertain etiology or clinical significance. Please correlate clinically. Diffuse anasarca throughout the soft tissues. Cardiomegaly. XR CHEST PORTABLE   Final Result   Stable right pleural effusion. IR GUIDED THORACENTESIS PLEURAL    (Results Pending)           PROBLEM LIST:  Principal Problem:    Acute on chronic respiratory failure with hypoxia and hypercapnia (HCC)  Active Problems:    Palliative care encounter  Resolved Problems:    * No resolved hospital problems.  *      IMPRESSION:  Cor pulmonale  Large right pleural effusion  Obesity hypoventilation syndrome  Morbid obesity  Atrial fibrillation  SHANTANU, slightly improved compared to yesterday  Hypokalemia  Coagulopathy  Acute, superimposed on chronic, hypoxemic and hypercapnic respiratory failure    PLAN:  Vitamin K IV today  Continue to hold anticoagulants  Reschedule thoracentesis for tomorrow  Wean FiO2 as able  Needs supplementary potassium  Follow renal indices carefully        Electronically signed by Carroll Vasquez MD on 1/31/2023 at 2:55 PM

## 2023-01-31 NOTE — CONSULTS
Palliative Care Department  [phone]  Palliative Care Initial Consult  Provider Billy Canales, APRN - CNP     Rodriguez Patel  [id number]  Hospital Day: 15  Date of Initial Consult: 1/30/2023  Referring Provider: Reji Lindsey MD   Palliative Medicine was consulted for assistance with: Goals of care, CODE STATUS discussion    HPI:   Rodriguez Patel is a 67 y.o. with a medical history of CHF, A. fib, anxiety, asthma, CAD, breast CA, COPD, depression, diabetes, hyperlipidemia, hypertension, SERENA, Parkinson's disease who was admitted on 1/17/2023 from nursing facility with a CHIEF COMPLAINT of hypotension, weight gain, leg swelling. Patient states that she is having shortness of breath. She wears 2 to 3 L O2 via nasal cannula at baseline. Patient was at CHF clinic and was hypotensive and had a 4 pound weight gain over the past week. In ED, patient was found to be hypotensive with A. fib RVR and respiratory failure was placed on BiPAP. Chest x-ray showed stable right pleural effusion. Patient had a repeat chest x-ray 1/30/2023 which showed complete opacification of the right hemithorax related to pleural effusion. Modest contralateral infiltrate ileum and/or atelectasis at the left lower chest.  Thoracentesis ordered for today. Pulmonology, ID, nephrology following. Palliative medicine consulted for further assistance. ASSESSMENT/PLAN:     Pertinent Hospital Diagnoses     Acute on chronic respiratory failure with hypoxia  Congestive heart failure  Atrial fibrillation  Urinary tract infection  Acute on chronic kidney disease      Palliative Care Encounter / Counseling Regarding Goals of Care  Please see detailed goals of care discussion as below  At this time, Rodriguez Patel, Does have capacity for medical decision-making.   Capacity is time limited and situation/question specific  During encounter Alli Harvey was surrogate medical decision-maker  Outcome of goals of care meeting:   Continue current medical management  SW/CM to provide healthcare power of  documents  Discussed CODE STATUS options  Code status Full Code  Advanced Directives: no POA or living will in epic  Surrogate/Legal NOK:  Lou Lindquist ([phone]  Blas Butler (child) [phone]  Kanchan Batres (child) [phone]    Spiritual assessment: no spiritual distress identified  Bereavement and grief: to be determined  Referrals to: none today  SUBJECTIVE:     Current medical issues leading to Palliative Medicine involvement include   Active Hospital Problems    Diagnosis Date Noted    Acute on chronic respiratory failure with hypoxia and hypercapnia ([facility] [address] [X48.27, J96.22] 01/17/2023     Priority: Medium       Details of Conversation:    Chart reviewed. Patient seen at bedside, alert and oriented x4, somewhat anxious. Patient able to make decisions for herself and able to partake in meaningful conversation. No family present at bedside, patient's daughter, Carolina Holliday, present via telephone. Introduced self and role of palliative medicine. Discussed patient's current condition and comorbidities. Discussed at length with patient to be compliant with medications and fluid intake due to CHF and CKD. Patient is scheduled for thoracentesis today due to large right-sided pleural effusion. Patient remains on Airvo 60 L/min and 60% FiO2. Patient has a significant history and multiple comorbidities. Discussed goals of care and CODE STATUS discussion. Patient states that in the event of cardiopulmonary arrest she would like to be resuscitated. I reviewed with the patient and family that given multiple medical co-morbidities, advanced disease process, and current severe acute illness, in the event of cardiac arrest, the chances of surviving may likely be low. It was also reviewed that if they survived a cardiac arrest, a return to prior level of function also may be low.   Patient understands but wishes to continue with current medical management and full CODE STATUS. Patient states she does not have a living will and healthcare power of  documents. The patient does not have a spouse and has 7 children. Inga Nick requests that Xuan Guy be primary decision-maker and Cherrie Robledo be secondary decision-maker. KRISHNA/CM to provide Inga Nick with healthcare power of  documents to be filled out. Inga Nick will return to nursing facility when medically stable. All questions and concerns addressed. Palliative medicine to follow. OBJECTIVE:   Prognosis: Guarded    Physical Exam:  BP (!) 94/57   Pulse 98   Temp 97.7 °F (36.5 °C) (Oral)   Resp 20   Ht 5' (1.524 m)   Wt 257 lb (116.6 kg) Comment: Patient placed on new bed, weight is off from previous bed scale. SpO2 98%   BMI 50.19 kg/m²   Constitutional:  Obese, awake, alert  Eyes: no scleral icterus, normal lids, no discharge  ENMT:  Normocephalic, atraumatic, mucosa moist, EOMI  Neck:  trachea midline, no JVD  Lungs:  Diminished  Heart[de-identified]  Irregular  Abd:  Soft, obese, non tender, distended, bowel sounds present  Ext:  Weakness, +edema, pulses present  Skin:  Warm and dry, impaired skin integrity  Psych: anxious  Neuro:  A&Ox4, following commands    Objective data reviewed: labs, images, records, medication use, vitals, and chart    Discussed patient and the plan of care with the other IDT members: Palliative Medicine IDT Team, Floor Nurse, and Patient    Time/Communication  Greater than 50% of time spent, total 55 minutes in counseling and coordination of care at the bedside regarding goals of care and diagnosis and prognosis. Thank you for allowing Palliative Medicine to participate in the care of Brandin Coe.

## 2023-01-31 NOTE — CARE COORDINATION
SS NOTE: COVID NEGATIVE 1/17, PT WILL NEED A RAPID NEGATIVE COVID TEST TO [facility]. Pt is on AIRVO, and bipap at 60%. Pt has a large PE. Pt to have thoracentesis today. Pt has a peripheral line. She has a genao. She has a wound on her left lower leg. Palliative Care and Critical.  Pt is going to return to long term care at [address] [facility] when dched. Pt is out of skilled days there and they will activate Medicaid for return there. For the return pt will need NO PRECERT, and will need a signed [place]. In regard to pt's concern that the Bipap here is more effective that the bipap at the [facility]. SW shared pt's bipap settings with [facility] and their settings was different- that set up the bipap there to be same setting as here. SS to continue. ESTHER Woody.1/31/2023.12:09PM.

## 2023-01-31 NOTE — PROGRESS NOTES
[facility] MD April Singh, MD Trent Ashley, MD Tasia Russell, BRADLEY Devlin, NUNU Villarreal, CNP  Progress Note    1/31/2023    SUBJECTIVE:   1/20: Feeling little better today. Denies new complaint. Still tachypnea, though denies dyspnea no cp/palp Appetite ok ROS otherwise unremarkable    1//21 seen in her room on o2/nc bp stable weak poor po intake   2+ edema in LE     1/22 charts reviewed . On bipap    1/23 : on o2/nc . Still look hypervolemic     1/24 seen in her room comfortable o2/nc . Still with LE edema which seems to be multifactorial and will likely need to accept having some edema on board     1/25 sitting on edge of the bed working with pt . Still with considerable edema in LEs   BP stable     1/26 seen in her room reports of SOB with minimal activity , LE edema still signficant . 1/27 good UO On 5 L/o2/nc  Still with sob with activity Tachycardiac with low functional capacity     1/28: Asleep, resting and breathing comfortably on nasal cannula. Remains edematous. Ongoing fatigue and generalized weakness. 1/29: Hypotensive last evening, Bumex drip stopped, IV normal saline bolus administered to effect. Breathing little more easily though still on AVAPS. To be downgraded to CPAP shortly. Thirsty. Still markedly edematous. 1/30: Somnolent, though arousable. On CPAP. Ongoing fatigue and malaise with generalized weakness    1/31 seen in her room , skin wrinkling in LE on HFNC . BP on lower side  For thoracentesis today   Dw RN . PROBLEM LIST:    Principal Problem:    Acute on chronic respiratory failure with hypoxia and hypercapnia (HCC)  Resolved Problems:    * No resolved hospital problems. *         DIET:    ADULT DIET; Regular; 3 carb choices (45 gm/meal); Low Fat/Low Chol/High Fiber/2 gm Na; Moderately Thick (Honey); 1800 ml  ADULT ORAL NUTRITION SUPPLEMENT; Dinner;  Other Oral Supplement; Gelatein 20     MEDS (scheduled):    [Held by provider] dilTIAZem  60 mg Oral 3 times per day    sucralfate  1 g Oral 4x Daily    rOPINIRole  1 mg Oral TID    atorvastatin  20 mg Oral Nightly    ipratropium-albuterol  1 vial Inhalation 4x Daily    [Held by provider] insulin glargine  13 Units SubCUTAneous BID    [Held by provider] LORazepam  0.5 mg Oral Nightly    metoprolol succinate  25 mg Oral BID    insulin lispro  0-4 Units SubCUTAneous TID WC    insulin lispro  0-4 Units SubCUTAneous Nightly    budesonide  0.5 mg Nebulization BID    sodium chloride flush  10 mL IntraVENous 2 times per day    miconazole   Topical BID    arformoterol tartrate  15 mcg Nebulization BID    pantoprazole  40 mg Oral QAM AC    [Held by provider] apixaban  5 mg Oral BID    mirtazapine  7.5 mg Oral Nightly    [Held by provider] aspirin  81 mg Oral Daily    carbidopa-levodopa  1 tablet Oral TID    sertraline  50 mg Oral Daily    montelukast  10 mg Oral Nightly    midodrine  5 mg Oral TID WC       MEDS (infusions):   sodium chloride      dextrose         MEDS (prn):  sodium chloride, loperamide, sodium chloride flush, sodium chloride, promethazine **OR** ondansetron, polyethylene glycol, acetaminophen **OR** acetaminophen, glucose, dextrose bolus **OR** dextrose bolus, glucagon (rDNA), dextrose    PHYSICAL EXAM:     Patient Vitals for the past 24 hrs:   BP Temp Temp src Pulse Resp SpO2   01/31/23 0937 (!) 90/47 98.8 °F (37.1 °C) Oral 75 19 100 %   01/31/23 0622 (!) 94/57 -- -- -- -- --   01/31/23 0439 90/60 97.7 °F (36.5 °C) Oral 98 20 98 %   01/31/23 0206 -- -- -- -- 21 --   01/30/23 2025 93/61 97.9 °F (36.6 °C) Oral 98 22 96 %   01/30/23 1822 (!) 104/56 97.8 °F (36.6 °C) Oral (!) 108 -- 96 %   01/30/23 1431 -- -- -- -- 23 --     @      Intake/Output Summary (Last 24 hours) at 1/31/2023 1235  Last data filed at 1/31/2023 0449  Gross per 24 hour   Intake --   Output 1150 ml   Net -1150 ml           Wt Readings from Last 3 Encounters:   01/30/23 257 lb (116.6 kg)   01/16/23 259 lb (117.5 kg)   01/16/23 259 lb (117.5 kg)     Age-appropriate morbidly obese white woman, though easily arousable, no apparent distress  NC/AT EOMI sclera conjunctive are clear and anicteric mucous membranes are moist  Neck soft supple trachea midline  Coarse breath sounds with crackles on the right, mildly prolonged expiratory phase but no wheeze. Arguably a little more clear than yesterday  Regular rhythm normal S1 and S2  Abdomen soft nontender nondistended normal active bowel sounds. Abdominal pannus has substantial edema  Distal extremities, thighs, sacrum have substantial chronic type nonpitting edema, though no pitting edema  Pulses 1+ x4  Moves all 4 extremities  Cranial nerves II through XII grossly intact  Awake, alert, interactive and appropriate  Skin tone consistent with chronic venous stasis distally      DATA:    Recent Labs     01/30/23  0500   WBC 7.2   HGB 7.7*   HCT 26.6*   MCV 89.3   PLT 75*         Recent Labs     01/29/23  0817 01/30/23  0500 01/31/23  0651    148* 146   K 3.3* 3.9 3.0*   CL 98 99 98   CO2 40* 37* 39*   MG 1.7 1.6  --    PHOS 4.8* 5.0* 4.5   BUN 61* 64* 61*   CREATININE 2.3* 2.3* 2.3*   ALT  --  <5  --    AST  --  8  --    BILITOT  --  0.3  --    ALKPHOS  --  60  --          Lab Results   Component Value Date    LABPROT 0.5 (H) 01/18/2023    LABPROT 0.5 01/18/2023     On CT no hydro/signs of obstruction     Urine studies  U Na 21, U Cl 23 U prot:cr ratio 0.5    ASSESSMENT / RECOMMENDATIONS:       Assessment  1. Acute kidney injury urine lytes support decrease effective volume     2. CKD stage III, Baseline creatinine 1.4 to 1.7 mg/dL, secondary to diabetic nephropathy and renal microvascular atherosclerotic disease  0.5 g proteinuria     3. Anemia due to CKD, phlebotomy associated prolonged hospitalization     4. Acute hypercapnic and hypoxic respiratory failure due to COPD exacerbation and pneumonia. Does not appear hypervolemic.     5.  Morbid obesity, BMI 50.6 kg per metered square     6. Edema/anasarca, chronic, multifactorial, due to venous insufficiency in the setting of morbid obesity, relative immobility, likely diastolic dysfunction though it was \"indeterminate\" on echo, the does have pulmonary hypertension, mild right-sided heart failure    7. Hypotension       Creatinine stable   Chest x-ray demonstrates right hemithorax opacification consistent with pleural effusion.   For thoracentesis tomorrow 1/31    Recommendations    Continue to hold bumex drip for now  Compression stockings   follow labs, UO  Out of bed to chair as able  Continue supportive care  Once thoracentesis completed, consider resuming diuretic therapy  Resume midodrine 5 mg tid      Electronically signed by Tootie Block MD on 1/31/2023

## 2023-01-31 NOTE — CARE COORDINATION
SS NOTE. SW met with pt today to explain and assist with Advanced Directives. Pt already knew and was able to explain what the Living Will and Durable Power of Jose Avila was about and for. Documents completed today and copies were placed in pt's soft chart. Originals given to pt along with a copy for her daughter Gato Chan. ESTHER Vaughn.1/31/2023. Georgia Dominguez 5:12 PM.

## 2023-02-01 ENCOUNTER — APPOINTMENT (OUTPATIENT)
Dept: INTERVENTIONAL RADIOLOGY/VASCULAR | Age: 73
End: 2023-02-01
Payer: MEDICARE

## 2023-02-01 ENCOUNTER — APPOINTMENT (OUTPATIENT)
Dept: GENERAL RADIOLOGY | Age: 73
End: 2023-02-01
Payer: MEDICARE

## 2023-02-01 LAB
ANION GAP SERPL CALCULATED.3IONS-SCNC: 8 MMOL/L (ref 7–16)
APPEARANCE FLUID: NORMAL
BUN BLDV-MCNC: 57 MG/DL (ref 6–23)
CALCIUM SERPL-MCNC: 8.3 MG/DL (ref 8.6–10.2)
CELL COUNT FLUID TYPE: NORMAL
CHLORIDE BLD-SCNC: 96 MMOL/L (ref 98–107)
CO2: 40 MMOL/L (ref 22–29)
COLOR FLUID: YELLOW
CREAT SERPL-MCNC: 2.2 MG/DL (ref 0.5–1)
CRITICAL: ABNORMAL
DATE ANALYZED: ABNORMAL
DATE OF COLLECTION: ABNORMAL
FLUID TYPE: NORMAL
GFR SERPL CREATININE-BSD FRML MDRD: 23 ML/MIN/1.73
GLUCOSE BLD-MCNC: 146 MG/DL (ref 74–99)
GLUCOSE, FLUID: 169 MG/DL
INR BLD: 1.9
LAB: ABNORMAL
LD, FLUID: 53 U/L
Lab: ABNORMAL
Lab: ABNORMAL
MAGNESIUM: 1.6 MG/DL (ref 1.6–2.6)
METER GLUCOSE: 145 MG/DL (ref 74–99)
METER GLUCOSE: 179 MG/DL (ref 74–99)
METER GLUCOSE: 182 MG/DL (ref 74–99)
METER GLUCOSE: 244 MG/DL (ref 74–99)
MONOCYTE, FLUID: 53 %
NEUTROPHIL, FLUID: 47 %
NUCLEATED CELLS FLUID: 194 /UL
OPERATOR ID: ABNORMAL
PH FLUID: ABNORMAL
POTASSIUM REFLEX MAGNESIUM: 3.2 MMOL/L (ref 3.5–5)
PROTEIN FLUID: 2 G/DL
PROTHROMBIN TIME: 21.5 SEC (ref 9.3–12.4)
RBC FLUID: <2000 /UL
SODIUM BLD-SCNC: 144 MMOL/L (ref 132–146)
SOURCE, BLOOD GAS: ABNORMAL
TIME ANALYZED: 1532

## 2023-02-01 PROCEDURE — 94640 AIRWAY INHALATION TREATMENT: CPT

## 2023-02-01 PROCEDURE — 2580000003 HC RX 258: Performed by: INTERNAL MEDICINE

## 2023-02-01 PROCEDURE — 87205 SMEAR GRAM STAIN: CPT

## 2023-02-01 PROCEDURE — 6370000000 HC RX 637 (ALT 250 FOR IP): Performed by: INTERNAL MEDICINE

## 2023-02-01 PROCEDURE — 80048 BASIC METABOLIC PNL TOTAL CA: CPT

## 2023-02-01 PROCEDURE — 2700000000 HC OXYGEN THERAPY PER DAY

## 2023-02-01 PROCEDURE — 82947 ASSAY GLUCOSE BLOOD QUANT: CPT

## 2023-02-01 PROCEDURE — 97110 THERAPEUTIC EXERCISES: CPT

## 2023-02-01 PROCEDURE — 83615 LACTATE (LD) (LDH) ENZYME: CPT

## 2023-02-01 PROCEDURE — 84157 ASSAY OF PROTEIN OTHER: CPT

## 2023-02-01 PROCEDURE — 6370000000 HC RX 637 (ALT 250 FOR IP): Performed by: NURSE PRACTITIONER

## 2023-02-01 PROCEDURE — 71045 X-RAY EXAM CHEST 1 VIEW: CPT

## 2023-02-01 PROCEDURE — 97530 THERAPEUTIC ACTIVITIES: CPT

## 2023-02-01 PROCEDURE — 36415 COLL VENOUS BLD VENIPUNCTURE: CPT

## 2023-02-01 PROCEDURE — 6360000002 HC RX W HCPCS: Performed by: NURSE PRACTITIONER

## 2023-02-01 PROCEDURE — 85610 PROTHROMBIN TIME: CPT

## 2023-02-01 PROCEDURE — 94660 CPAP INITIATION&MGMT: CPT

## 2023-02-01 PROCEDURE — 82962 GLUCOSE BLOOD TEST: CPT

## 2023-02-01 PROCEDURE — C1729 CATH, DRAINAGE: HCPCS

## 2023-02-01 PROCEDURE — 88112 CYTOPATH CELL ENHANCE TECH: CPT

## 2023-02-01 PROCEDURE — 32555 ASPIRATE PLEURA W/ IMAGING: CPT

## 2023-02-01 PROCEDURE — 88305 TISSUE EXAM BY PATHOLOGIST: CPT

## 2023-02-01 PROCEDURE — 99232 SBSQ HOSP IP/OBS MODERATE 35: CPT | Performed by: INTERNAL MEDICINE

## 2023-02-01 PROCEDURE — 83735 ASSAY OF MAGNESIUM: CPT

## 2023-02-01 PROCEDURE — 87070 CULTURE OTHR SPECIMN AEROBIC: CPT

## 2023-02-01 PROCEDURE — 83986 ASSAY PH BODY FLUID NOS: CPT

## 2023-02-01 PROCEDURE — 0W993ZX DRAINAGE OF RIGHT PLEURAL CAVITY, PERCUTANEOUS APPROACH, DIAGNOSTIC: ICD-10-PCS | Performed by: RADIOLOGY

## 2023-02-01 PROCEDURE — 2060000000 HC ICU INTERMEDIATE R&B

## 2023-02-01 RX ORDER — POTASSIUM CHLORIDE 20 MEQ/1
40 TABLET, EXTENDED RELEASE ORAL ONCE
Status: COMPLETED | OUTPATIENT
Start: 2023-02-01 | End: 2023-02-01

## 2023-02-01 RX ADMIN — MIRTAZAPINE 7.5 MG: 15 TABLET, FILM COATED ORAL at 22:18

## 2023-02-01 RX ADMIN — ANTI-FUNGAL POWDER MICONAZOLE NITRATE TALC FREE: 1.42 POWDER TOPICAL at 22:18

## 2023-02-01 RX ADMIN — Medication 10 ML: at 09:00

## 2023-02-01 RX ADMIN — IPRATROPIUM BROMIDE AND ALBUTEROL SULFATE 3 ML: .5; 2.5 SOLUTION RESPIRATORY (INHALATION) at 18:11

## 2023-02-01 RX ADMIN — MIDODRINE HYDROCHLORIDE 5 MG: 5 TABLET ORAL at 17:37

## 2023-02-01 RX ADMIN — IPRATROPIUM BROMIDE AND ALBUTEROL SULFATE 3 ML: .5; 2.5 SOLUTION RESPIRATORY (INHALATION) at 16:13

## 2023-02-01 RX ADMIN — METOPROLOL SUCCINATE 25 MG: 25 TABLET, FILM COATED, EXTENDED RELEASE ORAL at 08:49

## 2023-02-01 RX ADMIN — IPRATROPIUM BROMIDE AND ALBUTEROL SULFATE 3 ML: .5; 2.5 SOLUTION RESPIRATORY (INHALATION) at 10:25

## 2023-02-01 RX ADMIN — ARFORMOTEROL TARTRATE 15 MCG: 15 SOLUTION RESPIRATORY (INHALATION) at 18:11

## 2023-02-01 RX ADMIN — ROPINIROLE HYDROCHLORIDE 1 MG: 1 TABLET, FILM COATED ORAL at 16:00

## 2023-02-01 RX ADMIN — ARFORMOTEROL TARTRATE 15 MCG: 15 SOLUTION RESPIRATORY (INHALATION) at 06:53

## 2023-02-01 RX ADMIN — ROPINIROLE HYDROCHLORIDE 1 MG: 1 TABLET, FILM COATED ORAL at 22:18

## 2023-02-01 RX ADMIN — INSULIN LISPRO 1 UNITS: 100 INJECTION, SOLUTION INTRAVENOUS; SUBCUTANEOUS at 13:05

## 2023-02-01 RX ADMIN — CARBIDOPA AND LEVODOPA 1 TABLET: 50; 200 TABLET, EXTENDED RELEASE ORAL at 22:18

## 2023-02-01 RX ADMIN — ANTI-FUNGAL POWDER MICONAZOLE NITRATE TALC FREE: 1.42 POWDER TOPICAL at 09:00

## 2023-02-01 RX ADMIN — SUCRALFATE 1 G: 1 TABLET ORAL at 08:49

## 2023-02-01 RX ADMIN — ATORVASTATIN CALCIUM 20 MG: 20 TABLET, FILM COATED ORAL at 22:18

## 2023-02-01 RX ADMIN — BUDESONIDE 500 MCG: 0.5 SUSPENSION RESPIRATORY (INHALATION) at 06:53

## 2023-02-01 RX ADMIN — CARBIDOPA AND LEVODOPA 1 TABLET: 50; 200 TABLET, EXTENDED RELEASE ORAL at 16:00

## 2023-02-01 RX ADMIN — SUCRALFATE 1 G: 1 TABLET ORAL at 15:53

## 2023-02-01 RX ADMIN — MIDODRINE HYDROCHLORIDE 5 MG: 5 TABLET ORAL at 08:49

## 2023-02-01 RX ADMIN — CARBIDOPA AND LEVODOPA 1 TABLET: 50; 200 TABLET, EXTENDED RELEASE ORAL at 08:49

## 2023-02-01 RX ADMIN — MIDODRINE HYDROCHLORIDE 5 MG: 5 TABLET ORAL at 15:54

## 2023-02-01 RX ADMIN — METOPROLOL SUCCINATE 25 MG: 25 TABLET, FILM COATED, EXTENDED RELEASE ORAL at 22:18

## 2023-02-01 RX ADMIN — MONTELUKAST SODIUM 10 MG: 10 TABLET, FILM COATED ORAL at 22:18

## 2023-02-01 RX ADMIN — POTASSIUM CHLORIDE 40 MEQ: 1500 TABLET, EXTENDED RELEASE ORAL at 15:54

## 2023-02-01 RX ADMIN — SUCRALFATE 1 G: 1 TABLET ORAL at 22:18

## 2023-02-01 RX ADMIN — IPRATROPIUM BROMIDE AND ALBUTEROL SULFATE 3 ML: .5; 2.5 SOLUTION RESPIRATORY (INHALATION) at 06:53

## 2023-02-01 RX ADMIN — BUDESONIDE 500 MCG: 0.5 SUSPENSION RESPIRATORY (INHALATION) at 18:11

## 2023-02-01 RX ADMIN — SERTRALINE 50 MG: 50 TABLET, FILM COATED ORAL at 08:49

## 2023-02-01 RX ADMIN — ROPINIROLE HYDROCHLORIDE 1 MG: 1 TABLET, FILM COATED ORAL at 08:49

## 2023-02-01 RX ADMIN — POTASSIUM CHLORIDE 40 MEQ: 1500 TABLET, EXTENDED RELEASE ORAL at 08:49

## 2023-02-01 RX ADMIN — PANTOPRAZOLE SODIUM 40 MG: 40 TABLET, DELAYED RELEASE ORAL at 05:50

## 2023-02-01 ASSESSMENT — PAIN SCALES - GENERAL
PAINLEVEL_OUTOF10: 0
PAINLEVEL_OUTOF10: 0

## 2023-02-01 NOTE — PROGRESS NOTES
[facility] MD Latesha Oquendo MD Cecil Romance, MD Signa Judge, BRADLEY Devlin, NUNU Piña CNP  Progress Note    2/1/2023    SUBJECTIVE:   1/20: Feeling little better today. Denies new complaint. Still tachypnea, though denies dyspnea no cp/palp Appetite ok ROS otherwise unremarkable    1//21 seen in her room on o2/nc bp stable weak poor po intake   2+ edema in LE     1/22 charts reviewed . On bipap    1/23 : on o2/nc . Still look hypervolemic     1/24 seen in her room comfortable o2/nc . Still with LE edema which seems to be multifactorial and will likely need to accept having some edema on board     1/25 sitting on edge of the bed working with pt . Still with considerable edema in LEs   BP stable     1/26 seen in her room reports of SOB with minimal activity , LE edema still signficant . 1/27 good UO On 5 L/o2/nc  Still with sob with activity Tachycardiac with low functional capacity     1/28: Asleep, resting and breathing comfortably on nasal cannula. Remains edematous. Ongoing fatigue and generalized weakness. 1/29: Hypotensive last evening, Bumex drip stopped, IV normal saline bolus administered to effect. Breathing little more easily though still on AVAPS. To be downgraded to CPAP shortly. Thirsty. Still markedly edematous. 1/30: Somnolent, though arousable. On CPAP. Ongoing fatigue and malaise with generalized weakness    1/31 seen in her room , skin wrinkling in LE on HFNC . BP on lower side  For thoracentesis today   Dw RN .     2/1: Seen while laying in bed, no acute distress. Tells me that she is thirsty. She is on heated high flow nasal canula. Feels that her breathing has improved slightly. Plan is for thoracentesis later this afternoon, could not be done yesterday due to elevated INR. Still with skin wrinkling to bilateral lower extremities.      PROBLEM LIST:    Principal Problem:    Acute on chronic respiratory failure with hypoxia and hypercapnia (HCC)  Active Problems:    Palliative care encounter  Resolved Problems:    * No resolved hospital problems. *         DIET:    ADULT DIET; Regular; 3 carb choices (45 gm/meal); Low Fat/Low Chol/High Fiber/2 gm Na; Moderately Thick (Honey); 1800 ml  ADULT ORAL NUTRITION SUPPLEMENT; Dinner;  Other Oral Supplement; Gelatein 20     MEDS (scheduled):    potassium chloride  40 mEq Oral Once    [Held by provider] dilTIAZem  60 mg Oral 3 times per day    sucralfate  1 g Oral 4x Daily    rOPINIRole  1 mg Oral TID    atorvastatin  20 mg Oral Nightly    ipratropium-albuterol  1 vial Inhalation 4x Daily    [Held by provider] insulin glargine  13 Units SubCUTAneous BID    [Held by provider] LORazepam  0.5 mg Oral Nightly    metoprolol succinate  25 mg Oral BID    insulin lispro  0-4 Units SubCUTAneous TID WC    insulin lispro  0-4 Units SubCUTAneous Nightly    budesonide  0.5 mg Nebulization BID    sodium chloride flush  10 mL IntraVENous 2 times per day    miconazole   Topical BID    arformoterol tartrate  15 mcg Nebulization BID    pantoprazole  40 mg Oral QAM AC    [Held by provider] apixaban  5 mg Oral BID    mirtazapine  7.5 mg Oral Nightly    [Held by provider] aspirin  81 mg Oral Daily    carbidopa-levodopa  1 tablet Oral TID    sertraline  50 mg Oral Daily    montelukast  10 mg Oral Nightly    midodrine  5 mg Oral TID WC       MEDS (infusions):   sodium chloride      dextrose         MEDS (prn):  sodium chloride, loperamide, sodium chloride flush, sodium chloride, promethazine **OR** ondansetron, polyethylene glycol, acetaminophen **OR** acetaminophen, glucose, dextrose bolus **OR** dextrose bolus, glucagon (rDNA), dextrose    PHYSICAL EXAM:     Patient Vitals for the past 24 hrs:   BP Temp Temp src Pulse Resp SpO2 Weight   02/01/23 1025 -- -- -- -- -- 99 % --   02/01/23 0805 (!) 94/48 99.1 °F (37.3 °C) Oral 100 18 100 % --   02/01/23 0743 -- -- -- -- -- 100 % --   02/01/23 0653 -- -- -- -- -- 98 % --   02/01/23 0339 (!) 92/58 -- -- -- -- -- --   02/01/23 0204 (!) 84/50 -- -- -- -- -- --   02/01/23 0200 (!) 80/45 98.4 °F (36.9 °C) Oral 100 20 97 % --   02/01/23 0049 -- -- -- -- -- -- 199 lb 5 oz (90.4 kg)   01/31/23 2250 -- -- -- -- 20 -- --   01/31/23 2110 (!) 92/50 98.4 °F (36.9 °C) Oral 99 20 100 % --     @      Intake/Output Summary (Last 24 hours) at 2/1/2023 1319  Last data filed at 2/1/2023 0204  Gross per 24 hour   Intake 340 ml   Output 1050 ml   Net -710 ml           Wt Readings from Last 3 Encounters:   02/01/23 199 lb 5 oz (90.4 kg)   01/16/23 259 lb (117.5 kg)   01/16/23 259 lb (117.5 kg)     Age-appropriate morbidly obese white woman, though easily arousable, no apparent distress  NC/AT EOMI sclera conjunctive are clear and anicteric mucous membranes are moist  Neck soft supple trachea midline  Lungs clear to ausculation bilaterally, diminished in the bases. Poor inspiratory effort. Regular rhythm normal S1 and S2  Abdomen soft nontender nondistended normal active bowel sounds.   Abdominal pannus has substantial edema  Distal extremities, thighs, sacrum have substantial chronic type nonpitting edema, +1 BLE edema, though with skin wrinkling   Pulses 1+ x4  Moves all 4 extremities  Cranial nerves II through XII grossly intact  Awake, alert, interactive and appropriate  Skin tone consistent with chronic venous stasis distally      DATA:    Recent Labs     01/30/23  0500   WBC 7.2   HGB 7.7*   HCT 26.6*   MCV 89.3   PLT 75*         Recent Labs     01/30/23  0500 01/31/23  0651 02/01/23  0916   * 146 144   K 3.9 3.0* 3.2*   CL 99 98 96*   CO2 37* 39* 40*   MG 1.6  --  1.6   PHOS 5.0* 4.5  --    BUN 64* 61* 57*   CREATININE 2.3* 2.3* 2.2*   ALT <5  --   --    AST 8  --   --    BILITOT 0.3  --   --    ALKPHOS 60  --   --          Lab Results   Component Value Date    LABPROT 0.5 (H) 01/18/2023    LABPROT 0.5 01/18/2023     On CT no hydro/signs of obstruction     Urine studies  U Na 21, U Cl 23 U prot:cr ratio 0.5    ASSESSMENT / RECOMMENDATIONS:       Assessment  1. Acute kidney injury urine lytes support decrease effective volume     2. CKD stage III, Baseline creatinine 1.4 to 1.7 mg/dL, secondary to diabetic nephropathy and renal microvascular atherosclerotic disease  0.5 g proteinuria     3. Anemia due to CKD, phlebotomy associated prolonged hospitalization     4. Acute hypercapnic and hypoxic respiratory failure due to COPD exacerbation and pneumonia. Does not appear hypervolemic. 5.  Morbid obesity, BMI 50.6 kg per metered square     6. Edema/anasarca, chronic, multifactorial, due to venous insufficiency in the setting of morbid obesity, relative immobility, likely diastolic dysfunction though it was \"indeterminate\" on echo, the does have pulmonary hypertension, mild right-sided heart failure    7. Hypotension       Creatinine stable   Chest x-ray demonstrates right hemithorax opacification consistent with pleural effusion.   For thoracentesis today 2/1     Recommendations  Continue to hold bumex drip for now (still with slight wrinkling to lower ext)  Agree with potassium replacement   Compression stockings   follow labs, UO  Out of bed to chair as able  Continue supportive care  Once thoracentesis completed, consider resuming diuretic therapy  Continue midodrine 5 mg tid      Electronically signed by RASHARD Bass CNP on 2/1/2023

## 2023-02-01 NOTE — PROGRESS NOTES
Pulmonary/Critical Care Progress Note    We are following patient for acute respiratory failure, large right pleural effusion, severe COPD,  hypokalemia, diastolic dysfunction, diabetes mellitus type 2, morbid obesity, obstructive sleep apnea, Parkinson's disease, SHANTANU superimposed on CKD    SUBJECTIVE:  Patient underwent a right thoracentesis today and 1000 cc of fluid was removed. We do not as yet have any chemistries or other information available. Patient looks quite bright and is asking for something to drink while breathing heated high flow nasal cannula oxygen. This is clearly the best I have seen. ,  Influenced by the salutary effect of the thoracentesis.     MEDICATIONS:   potassium chloride  40 mEq Oral Once    [Held by provider] dilTIAZem  60 mg Oral 3 times per day    sucralfate  1 g Oral 4x Daily    rOPINIRole  1 mg Oral TID    atorvastatin  20 mg Oral Nightly    ipratropium-albuterol  1 vial Inhalation 4x Daily    [Held by provider] insulin glargine  13 Units SubCUTAneous BID    [Held by provider] LORazepam  0.5 mg Oral Nightly    metoprolol succinate  25 mg Oral BID    insulin lispro  0-4 Units SubCUTAneous TID WC    insulin lispro  0-4 Units SubCUTAneous Nightly    budesonide  0.5 mg Nebulization BID    sodium chloride flush  10 mL IntraVENous 2 times per day    miconazole   Topical BID    arformoterol tartrate  15 mcg Nebulization BID    pantoprazole  40 mg Oral QAM AC    [Held by provider] apixaban  5 mg Oral BID    mirtazapine  7.5 mg Oral Nightly    [Held by provider] aspirin  81 mg Oral Daily    carbidopa-levodopa  1 tablet Oral TID    sertraline  50 mg Oral Daily    montelukast  10 mg Oral Nightly    midodrine  5 mg Oral TID WC      sodium chloride      dextrose       sodium chloride, loperamide, sodium chloride flush, sodium chloride, promethazine **OR** ondansetron, polyethylene glycol, acetaminophen **OR** acetaminophen, glucose, dextrose bolus **OR** dextrose bolus, glucagon (rDNA), dextrose      REVIEW OF SYSTEMS:  Constitutional: Denies fever, weight loss, night sweats, and fatigue  Skin: Denies pigmentation, dark lesions, and rashes   HEENT: Denies hearing loss, tinnitus, ear drainage, epistaxis, sore throat, and hoarseness. Cardiovascular: Denies palpitations, chest pain, and chest pressure. Respiratory: Denies cough, dyspnea at rest, hemoptysis, apnea, and choking. Gastrointestinal: Denies nausea, vomiting, poor appetite, diarrhea, heartburn or reflux  Genitourinary: Denies dysuria, frequency, urgency or hematuria  Musculoskeletal: Denies myalgias, muscle weakness, and bone pain  Neurological: Denies dizziness, vertigo, headache, and focal weakness  Psychological: Denies anxiety and depression  Endocrine: Denies heat intolerance and cold intolerance  Hematopoietic/Lymphatic: Denies bleeding problems and blood transfusions    OBJECTIVE:  Vitals:    02/01/23 1025   BP:    Pulse:    Resp:    Temp:    SpO2: 99%     FiO2 : (S) 55 %  O2 Flow Rate (L/min): 50 L/min  O2 Device: Heated high flow cannula    PHYSICAL EXAM:  Constitutional: Minimal fever of 99.1 O2 saturation on FiO2 50% is 99% this morning. I would expect that further improvement has occurred and that the alveolar-arterial oxygen gradient is decreased. Skin: Chronic venous stasis changes both lower extremities  HEENT: Mucous membranes moist  Neck: No JVD, lymphadenopathy, thyromegaly  Cardiovascular: S1, S2 regular.   No S3 or murmurs or rubs present  Respiratory: Improved breath sounds right hemithorax without wheezes left side is clear  Gastrointestinal: Soft, obese, nontender  Genitourinary: No obvious CVA tenderness  Extremities: Trace to 1+ edema left ankle; right leg shows no clubbing, cyanosis, or edema  Neurological: Alert, awake, no obvious focal motor deficits  Psychological: Depressed affect however but spirits are slightly better than yesterday    LABS:  WBC   Date Value Ref Range Status   01/30/2023 7.2 4.5 - 11.5 E9/L Final   01/26/2023 10.6 4.5 - 11.5 E9/L Final   01/22/2023 7.1 4.5 - 11.5 E9/L Final     Hemoglobin   Date Value Ref Range Status   01/30/2023 7.7 (L) 11.5 - 15.5 g/dL Final   01/26/2023 9.2 (L) 11.5 - 15.5 g/dL Final   01/22/2023 8.2 (L) 11.5 - 15.5 g/dL Final     Hematocrit   Date Value Ref Range Status   01/30/2023 26.6 (L) 34.0 - 48.0 % Final   01/26/2023 34.6 34.0 - 48.0 % Final   01/22/2023 30.8 (L) 34.0 - 48.0 % Final     MCV   Date Value Ref Range Status   01/30/2023 89.3 80.0 - 99.9 fL Final   01/26/2023 94.3 80.0 - 99.9 fL Final   01/22/2023 93.1 80.0 - 99.9 fL Final     Platelets   Date Value Ref Range Status   01/30/2023 75 (L) 130 - 450 E9/L Final   01/26/2023 169 130 - 450 E9/L Final   01/22/2023 189 130 - 450 E9/L Final     Sodium   Date Value Ref Range Status   02/01/2023 144 132 - 146 mmol/L Final   01/31/2023 146 132 - 146 mmol/L Final   01/30/2023 148 (H) 132 - 146 mmol/L Final     Potassium   Date Value Ref Range Status   01/31/2023 3.0 (L) 3.5 - 5.0 mmol/L Final   01/30/2023 3.9 3.5 - 5.0 mmol/L Final   01/29/2023 3.3 (L) 3.5 - 5.0 mmol/L Final     Potassium reflex Magnesium   Date Value Ref Range Status   02/01/2023 3.2 (L) 3.5 - 5.0 mmol/L Final   01/20/2023 4.8 3.5 - 5.0 mmol/L Final   01/19/2023 4.9 3.5 - 5.0 mmol/L Final     Chloride   Date Value Ref Range Status   02/01/2023 96 (L) 98 - 107 mmol/L Final   01/31/2023 98 98 - 107 mmol/L Final   01/30/2023 99 98 - 107 mmol/L Final     CO2   Date Value Ref Range Status   02/01/2023 40 (H) 22 - 29 mmol/L Final   01/31/2023 39 (H) 22 - 29 mmol/L Final   01/30/2023 37 (H) 22 - 29 mmol/L Final     BUN   Date Value Ref Range Status   02/01/2023 57 (H) 6 - 23 mg/dL Final   01/31/2023 61 (H) 6 - 23 mg/dL Final   01/30/2023 64 (H) 6 - 23 mg/dL Final     Creatinine   Date Value Ref Range Status   02/01/2023 2.2 (H) 0.5 - 1.0 mg/dL Final   01/31/2023 2.3 (H) 0.5 - 1.0 mg/dL Final   01/30/2023 2.3 (H) 0.5 - 1.0 mg/dL Final     Glucose   Date Value Ref Range Status   02/01/2023 146 (H) 74 - 99 mg/dL Final   01/31/2023 111 (H) 74 - 99 mg/dL Final   01/30/2023 104 (H) 74 - 99 mg/dL Final     Calcium   Date Value Ref Range Status   02/01/2023 8.3 (L) 8.6 - 10.2 mg/dL Final   01/31/2023 8.4 (L) 8.6 - 10.2 mg/dL Final   01/30/2023 8.3 (L) 8.6 - 10.2 mg/dL Final     Total Protein   Date Value Ref Range Status   01/30/2023 5.0 (L) 6.4 - 8.3 g/dL Final   01/17/2023 6.1 (L) 6.4 - 8.3 g/dL Final   01/16/2023 6.3 (L) 6.4 - 8.3 g/dL Final     Albumin   Date Value Ref Range Status   01/30/2023 3.0 (L) 3.5 - 5.2 g/dL Final   01/17/2023 3.6 3.5 - 5.2 g/dL Final   01/16/2023 3.8 3.5 - 5.2 g/dL Final     Total Bilirubin   Date Value Ref Range Status   01/30/2023 0.3 0.0 - 1.2 mg/dL Final   01/17/2023 0.5 0.0 - 1.2 mg/dL Final   01/16/2023 0.4 0.0 - 1.2 mg/dL Final     Alkaline Phosphatase   Date Value Ref Range Status   01/30/2023 60 35 - 104 U/L Final   01/17/2023 98 35 - 104 U/L Final   01/16/2023 96 35 - 104 U/L Final     AST   Date Value Ref Range Status   01/30/2023 8 0 - 31 U/L Final   01/17/2023 9 0 - 31 U/L Final   01/16/2023 10 0 - 31 U/L Final     ALT   Date Value Ref Range Status   01/30/2023 <5 0 - 32 U/L Final   01/17/2023 <5 0 - 32 U/L Final   01/16/2023 <5 0 - 32 U/L Final     Est, Glom Filt Rate   Date Value Ref Range Status   02/01/2023 23 >=60 mL/min/1.73 Final     Comment:     Pediatric calculator link  [email or web address]_[email or web address]  Effective Oct 3, 2022  These results are not intended for use in patients  <25years of age. eGFR results are calculated without  a race factor using the 2021 CKD-EPI equation. Careful  clinical correlation is recommended, particularly when  comparing to results calculated using previous equations.   The CKD-EPI equation is less accurate in patients with  extremes of muscle mass, extra-renal metabolism of  creatinine, excessive creatinine ingestion, or following  therapy that affects renal tubular secretion. 01/31/2023 22 >=60 mL/min/1.73 Final     Comment:     Pediatric calculator link  [email or web address]. [email or web address]_[email or web address]  Effective Oct 3, 2022  These results are not intended for use in patients  <25years of age. eGFR results are calculated without  a race factor using the 2021 CKD-EPI equation. Careful  clinical correlation is recommended, particularly when  comparing to results calculated using previous equations. The CKD-EPI equation is less accurate in patients with  extremes of muscle mass, extra-renal metabolism of  creatinine, excessive creatinine ingestion, or following  therapy that affects renal tubular secretion. 01/30/2023 22 >=60 mL/min/1.73 Final     Comment:     Pediatric calculator link  [email or web address]. [email or web address]_[email or web address]  Effective Oct 3, 2022  These results are not intended for use in patients  <25years of age. eGFR results are calculated without  a race factor using the 2021 CKD-EPI equation. Careful  clinical correlation is recommended, particularly when  comparing to results calculated using previous equations. The CKD-EPI equation is less accurate in patients with  extremes of muscle mass, extra-renal metabolism of  creatinine, excessive creatinine ingestion, or following  therapy that affects renal tubular secretion.        GFR    Date Value Ref Range Status   10/16/2022 >60  Final   10/14/2022 59  Final   10/13/2022 >60  Final     Magnesium   Date Value Ref Range Status   02/01/2023 1.6 1.6 - 2.6 mg/dL Final   01/30/2023 1.6 1.6 - 2.6 mg/dL Final   01/29/2023 1.7 1.6 - 2.6 mg/dL Final     Phosphorus   Date Value Ref Range Status   01/31/2023 4.5 2.5 - 4.5 mg/dL Final   01/30/2023 5.0 (H) 2.5 - 4.5 mg/dL Final   01/29/2023 4.8 (H) 2.5 - 4.5 mg/dL Final     Recent Labs     01/30/23  0530   PH 7.350   PO2 78.6   PCO2 80.6*   HCO3 43.5*   BE 15.6*   O2SAT 94.6       RADIOLOGY:  XR CHEST PORTABLE   Final Result   Complete opacification of the right hemithorax related to pleural effusion. Modest contralateral infiltrate and or atelectasis at the left lower chest.         CT ABDOMEN PELVIS WO CONTRAST Additional Contrast? None   Final Result   No nephroureterolithiasis or hydronephrosis. Splenomegaly. Small volume of perihepatic ascites. At least moderate-sized partially imaged right pleural effusion with   bibasilar atelectasis. Several locules of gas which appear to be within the endometrium at the level   of the uterine fundus, of uncertain etiology or clinical significance. Please correlate clinically. Diffuse anasarca throughout the soft tissues. Cardiomegaly. XR CHEST PORTABLE   Final Result   Stable right pleural effusion. IR GUIDED THORACENTESIS PLEURAL    (Results Pending)   XR CHEST 1 VIEW    (Results Pending)           PROBLEM LIST:  Principal Problem:    Acute on chronic respiratory failure with hypoxia and hypercapnia (HCC)  Active Problems:    Palliative care encounter  Resolved Problems:    * No resolved hospital problems. *      IMPRESSION:  Status post thoracentesis of large right pleural effusion yielding 1000 cc-studies pending  Cor pulmonale  Probable diastolic dysfunction  SHANTANU with further improvement  Hypokalemia with potassium being repleted  Coagulopathy, improved  Acute superimposed on chronic hypoxemic and hypercapnic respiratory failure    PLAN:  Wean FiO2 as tolerated  Chest x-ray in a.m.   Supplementary potassium  PT and OT are very important  Follow renal indices  Hold anticoagulants until tomorrow      Electronically signed by Cornelio Weinstein MD on 2/1/2023 at 3:37 PM

## 2023-02-01 NOTE — PROGRESS NOTES
Patient came down to Special Procedures for ultrasound guided right thoracentesis. Procedure was explained, questions were answered. 1459 Starting procedure /79    1508  Ending procedure /104  110  24  100% on 15L by non rebreather    1000cc of clear yellow color pleural fluid drained from patient, petrolatum dressing folded 4 x 4 and tegaderm applied to right lower back back. Patient tolerated procedure    Post procedure chest xray taken    Respiratory came took specimen for Corinne 30    Specimen sent to lab. Nurse to nurse called spoke with Patricia Lara, nurse notified of above information    Patient transported back to floor.

## 2023-02-01 NOTE — PROGRESS NOTES
Department of Internal Medicine  General Internal Medicine  Attending Progress Note  Chief Complaint   Patient presents with    Respiratory Distress     Normally on 3L NC, came in on CPAP, given total of 50mcg push dose epi for low BP by EMS     SUBJECTIVE:    LESS FIO2 LESS BIPAP OVERALL LESS . No chest pain. No diarrhea.      OBJECTIVE      Medications    Current Facility-Administered Medications: sodium chloride (OCEAN, BABY AYR) 0.65 % nasal spray 1 spray, 1 spray, Each Nostril, PRN  [Held by provider] dilTIAZem (CARDIZEM) tablet 60 mg, 60 mg, Oral, 3 times per day  sucralfate (CARAFATE) tablet 1 g, 1 g, Oral, 4x Daily  rOPINIRole (REQUIP) tablet 1 mg, 1 mg, Oral, TID  atorvastatin (LIPITOR) tablet 20 mg, 20 mg, Oral, Nightly  ipratropium-albuterol (DUONEB) nebulizer solution 3 mL, 1 vial, Inhalation, 4x Daily  [Held by provider] insulin glargine (LANTUS) injection vial 13 Units, 13 Units, SubCUTAneous, BID  loperamide (IMODIUM) capsule 2 mg, 2 mg, Oral, 4x Daily PRN  [Held by provider] LORazepam (ATIVAN) tablet 0.5 mg, 0.5 mg, Oral, Nightly  metoprolol succinate (TOPROL XL) extended release tablet 25 mg, 25 mg, Oral, BID  insulin lispro (HUMALOG) injection vial 0-4 Units, 0-4 Units, SubCUTAneous, TID WC  insulin lispro (HUMALOG) injection vial 0-4 Units, 0-4 Units, SubCUTAneous, Nightly  budesonide (PULMICORT) nebulizer suspension 500 mcg, 0.5 mg, Nebulization, BID  sodium chloride flush 0.9 % injection 10 mL, 10 mL, IntraVENous, 2 times per day  sodium chloride flush 0.9 % injection 10 mL, 10 mL, IntraVENous, PRN  0.9 % sodium chloride infusion, , IntraVENous, PRN  promethazine (PHENERGAN) tablet 12.5 mg, 12.5 mg, Oral, Q6H PRN **OR** ondansetron (ZOFRAN) injection 4 mg, 4 mg, IntraVENous, Q6H PRN  polyethylene glycol (GLYCOLAX) packet 17 g, 17 g, Oral, Daily PRN  acetaminophen (TYLENOL) tablet 650 mg, 650 mg, Oral, Q6H PRN **OR** acetaminophen (TYLENOL) suppository 650 mg, 650 mg, Rectal, Q6H PRN  miconazole (MICOTIN) 2 % powder, , Topical, BID  arformoterol tartrate (BROVANA) nebulizer solution 15 mcg, 15 mcg, Nebulization, BID  pantoprazole (PROTONIX) tablet 40 mg, 40 mg, Oral, QAM AC  glucose chewable tablet 16 g, 4 tablet, Oral, PRN  dextrose bolus 10% 125 mL, 125 mL, IntraVENous, PRN **OR** dextrose bolus 10% 250 mL, 250 mL, IntraVENous, PRN  glucagon (rDNA) injection 1 mg, 1 mg, SubCUTAneous, PRN  dextrose 10 % infusion, , IntraVENous, Continuous PRN  [Held by provider] apixaban (ELIQUIS) tablet 5 mg, 5 mg, Oral, BID  mirtazapine (REMERON) tablet 7.5 mg, 7.5 mg, Oral, Nightly  [Held by provider] aspirin EC tablet 81 mg, 81 mg, Oral, Daily  carbidopa-levodopa (SINEMET CR)  MG per extended release tablet 1 tablet, 1 tablet, Oral, TID  sertraline (ZOLOFT) tablet 50 mg, 50 mg, Oral, Daily  montelukast (SINGULAIR) tablet 10 mg, 10 mg, Oral, Nightly  midodrine (PROAMATINE) tablet 5 mg, 5 mg, Oral, TID WC  Physical    VITALS:  BP (!) 94/48   Pulse 100   Temp 99.1 °F (37.3 °C) (Oral)   Resp 18   Ht 5' (1.524 m)   Wt 199 lb 5 oz (90.4 kg)   SpO2 100%   BMI 38.93 kg/m²   No acute distress moist membranes   Normocephalic, without obvious abnormality, atraumatic, external ears without lesions,   Neck supple no cervical lymphadenopathy  Heart has a regular rate and rhythm no murmur  Lungs are clear to auscultation bilaterally with equal movements. Abdomen is soft nontender nondistended no rebound or guarding. No significant peripheral   Anasarca  good peripheral perfusion. No significant rashes or new skin lesions. No new focality on neuro exam which is overall grossly intact.   Affect and mood seem to be appropriate     Data    CBC:   Lab Results   Component Value Date/Time    WBC 7.2 01/30/2023 05:00 AM    RBC 2.98 01/30/2023 05:00 AM    HGB 7.7 01/30/2023 05:00 AM    HCT 26.6 01/30/2023 05:00 AM    MCV 89.3 01/30/2023 05:00 AM    MCH 25.8 01/30/2023 05:00 AM    MCHC 28.9 01/30/2023 05:00 AM RDW 16.2 01/30/2023 05:00 AM    PLT 75 01/30/2023 05:00 AM    MPV 11.6 01/30/2023 05:00 AM     BMP:    Lab Results   Component Value Date/Time     02/01/2023 09:16 AM    K 3.2 02/01/2023 09:16 AM    CL 96 02/01/2023 09:16 AM    CO2 40 02/01/2023 09:16 AM    BUN 57 02/01/2023 09:16 AM    LABALBU 3.0 01/30/2023 05:00 AM    CREATININE 2.2 02/01/2023 09:16 AM    CALCIUM 8.3 02/01/2023 09:16 AM    GFRAA >60 10/16/2022 09:20 AM    LABGLOM 23 02/01/2023 09:16 AM    GLUCOSE 146 02/01/2023 09:16 AM       ASSESSMENT AND PLAN    Summary of stay: This is a 67 y.o. female  has a past medical history of A-fib ([address] Acute on chronic congestive heart failure ([address] Anxiety, Asthma, CAD (coronary artery disease), Cancer ([address] Chronic kidney disease, COPD exacerbation ([address] Depression, Diabetes mellitus ([address] H/O mammogram, Hx MRSA infection, Hyperlipidemia, Hypertension, Lateral epicondylitis, Morbid obesity ([address] SERENA on CPAP, Oxygen dependent, Parkinson's disease ([address] and Tubal ligation status. presented with SOB, hypotension  for last few days prior to arrival to the hospital. SOB is associated with some cough, nonproductive but no fever or chills reported. Initially SOB was mild, intermittent but gradually getting more persistent. Started gradually. Exacerbated by general activity or exertion. Relieved only partially by rest. In ED patient was found to be hypotensive with Afib RVR and respiratory failure and was placed on BIPAP. BP improved after midodrine dose. The patient was seen and examined at bedside, appears alert and awake with no acute distress and is able to answer simple  questions.  On direct questioning, patient denied any  resting ongoing chest pain, hemoptysis, productive cough, fever, ongoing palpitation, active abdominal pain, hematemesis, rectal bleeding, blessing, hematuria, any other  and GI complaints, and any new focal neuro deficits     Acute on chronic respiratory failure with hypoxia and hypercapnia & Obesity hypoventilation syndrome: s/p Bumex Drip per Nephro. Per pulm. Congestive heart failure: decompensated. Anasarca. Diuresis per nephro. Thoracentesis per pulmonary  Atrial Fibrillation: Eliquis on hold for thorocoentesis which was on hold on 1/31 inr 3.0 2/1 was 1.9. tap scheduled in afternoon of 2/1  UTI: vre on cx s/p zyvox stopped by ID  Acute on chronic Kidney disease:  Renal function is stable with crt of 2.3  Dm type 2: Insulin held due to Hypoglycemia   Parkinson disease:  sinemet cr. I ENCOURAGED MORE ACTIVITY she explained that she knows. Pulm Htn: Pulm following  Low K: again on 2/1 supplement and recheck in am, phos high normal 1/31 mg low normal 1/30. Recheck mag today  DVT prophylaxis: Lovenox  Full code.  Palliative care consult on going  Disposition: community skilled

## 2023-02-01 NOTE — PROGRESS NOTES
Physical Therapy  Physical Therapy Treatment Note/Plan of Care    Room #:  1592/8871-01  Patient Name: Brandin Coe  YOB: 1950  MRN: [id number]    Date of Service: 2/1/2023     Tentative placement recommendation: Subacute Rehab  Equipment recommendation:  To be determined      Evaluating Physical Therapist: Linda Hansen PT, DPT #[id number]      Specific Provider Orders/Date/Referring Provider :     01/19/23 0745    PT eval and treat  Start:  01/19/23 0745,   End:  01/19/23 0745,   ONE TIME,   Standing Count:  1 Occurrences,   Inna Garland MD Acknowledge New     Admitting Diagnosis:   NSTEMI (non-ST elevated myocardial infarction) [facility]) [I21.4]  Atrial fibrillation with rapid ventricular response (HCC) [I48.91]  Recurrent right pleural effusion [J90]  Hypotension, unspecified hypotension type [I95.9]  Acute on chronic respiratory failure with hypoxia and hypercapnia (HCC) [J96.21, J96.22]      Surgery: none  Visit Diagnoses         Codes    Recurrent right pleural effusion     J90    NSTEMI (non-ST elevated myocardial infarction) ([facility] [address]     I21.4            Patient Active Problem List   Diagnosis    Depression with anxiety    Osteoporosis    Asthma    Hyperlipidemia    Mitral regurgitation    Obstructive sleep apnea syndrome    Psoriasis    Diabetes mellitus ([facility] [address]    Parkinson's disease ([address]    Primary hypertension    Microalbuminuria    Morbid obesity (HCC)    RLS (restless legs syndrome)    Generalized weakness    Inability to walk    Hypothyroidism    Chest pain    Acute asthma exacerbation    Asthma exacerbation, mild    Paroxysmal atrial fibrillation (HCC)    Atrial fibrillation with rapid ventricular response (HCC)    Acute on chronic congestive heart failure ([address]    Coronary artery disease involving native coronary artery of native heart without angina pectoris    Dysphagia    Hepatosplenomegaly    Acute decompensated heart failure (HCC)    Acute diastolic (congestive) heart failure (HCC)    Nonrheumatic tricuspid valve regurgitation    Tobacco abuse    Recurrent syncope    Septic shock (HCC)    Seizure-like activity (HCC)    Acute kidney injury ([facility] [address]    Pancytopenia (HCC)    Thrombocytopenia (HCC)    Encephalopathy    Acute respiratory failure with hypoxia (HCC)    Lactic acidosis    Delirium    Acute on chronic anemia    Pleural effusion, right    Acute respiratory failure with hypoxia and hypercapnia (HCC)    Acute confusion    Acute on chronic diastolic heart failure (HCC)    Macrocytosis    Other specified anemias    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[facility] [address]    Puerperal sepsis with acute hypoxic respiratory failure without septic shock (HCC)    Pulmonary HTN (HCC)    Chronic anticoagulation    RVF (right ventricular failure) (HCC)    Metabolic alkalosis    Sepsis (HCC)    COPD exacerbation (HCC)    Acute on chronic respiratory failure with hypercapnia (HCC)    Persistent atrial fibrillation (HCC)    Hypercapnic respiratory failure (HCC)    Acute on chronic respiratory failure (HCC)    Hyperkalemia    Heart failure (HCC)    Acute renal insufficiency    Hypotension    Anemia    Acute on chronic respiratory failure with hypoxia and hypercapnia (HCC)    Palliative care encounter        ASSESSMENT of Current Deficits Patient exhibits decreased strength, balance, and endurance impairing functional mobility, transfers, gait , gait distance, and tolerance to activity. Patient with inc difficulty breathing at times despite SPO2 %. Patient on AirVO 68ANE0 and 50L. Patient fatigues quickly, limiting further function. Patient needed some assist with supine exercises and repositioning in bed and to head of bed. Positioned for skin integrity with patient declining foam heel floaters. Pt would benefit from further therapy to address above deficits and inc mobility to decrease risk of falls.        PHYSICAL THERAPY  PLAN OF CARE       Physical therapy plan of care is established based on physician order,  patient diagnosis and clinical assessment    Current Treatment Recommendations:    -Bed Mobility: Lower extremity exercises  and Trunk control activities   -Sitting Balance: Incorporate reaching activities to activate trunk muscles , Hands on support to maintain midline , Facilitate active trunk muscle engagement , Facilitate postural control in all planes , and Engage in core activities to allow for movement within base of support   -Standing Balance: Perform strengthening exercises in standing to promote motor control with or without upper extremity support , Instruct patient on adequate base of support to maintain balance, and Challenge balance utilizing reaching  activities beyond center of gravity    -Transfers: Provide instruction on proper hand and foot position for adequate transfer of weight onto lower extremities and use of gait device if needed, Cues for hand placement, technique and safety. Provide stabilization to prevent fall , Facilitate weight shift forward on to lower extremities and provide necessary stabilization of bilateral lower extremities , Support transfer of weight on to lower extremities, and Assist with extension of knees trunk and hip to accept weight transfer   -Gait: Gait training, Standing activities to improve: base of support, weight shift, weight bearing , Exercises to improve trunk control, Exercises to improve hip and knee control, Performance of protected weight bearing activities, and Activities to increase weight bearing   -Endurance: Utilize Supervised activities to increase level of endurance to allow for safe functional mobility including transfers and gait  and Use graduated activities to promote good breathing techniques and provide support and education to maximize respiratory function    PT long term treatment goals are located in below grid    Patient and or family understand(s) diagnosis, prognosis, and plan of care.     Frequency of treatments: Patient will be seen  3-5 times/week. Prior Level of Function: Patient ambulated with wheeled walker for short distances  Rehab Potential: fair + for baseline    Past medical history:   Past Medical History:   Diagnosis Date    A-fib ([facility] [address]     Acute on chronic congestive heart failure (HCC)     Anxiety     Asthma     CAD (coronary artery disease) 01/21/2016    Cancer ([facility] [address]  breast ca 2006    right lumpectomy    Chronic kidney disease     nephrolithiasis    COPD exacerbation ([facility] [address] 10/12/2022    Depression     Diabetes mellitus ([facility] [address]     H/O mammogram     Hx MRSA infection     toe infection january 2012    Hyperlipidemia     Hypertension     Lateral epicondylitis     Morbid obesity (HCC)     SERENA on CPAP     Oxygen dependent     Parkinson's disease ([facility] [address]     Tubal ligation status      Past Surgical History:   Procedure Laterality Date    BREAST LUMPECTOMY      BREAST REDUCTION SURGERY      CARDIAC CATHETERIZATION  4/28/2014    Dr. Neeta Hill  01/11/2022    Dr. Sandie Ramirez  7/29/15    CT GUIDED CHEST TUBE  7/8/2022    CT GUIDED CHEST TUBE 7/8/2022 Shilpi Cobian MD [facility]    ENDOSCOPY, COLON, DIAGNOSTIC  7/19/15    GALLBLADDER SURGERY      LUMBAR LAMINECTOMY      TOE AMPUTATION      TONSILLECTOMY      UPPER GASTROINTESTINAL ENDOSCOPY      UPPER GASTROINTESTINAL ENDOSCOPY N/A 7/19/2022    EGD ESOPHAGOGASTRODUODENOSCOPY performed by Jerry Gunter MD at [address] [place] [address]:    Precautions:  Up with assistance, falls, O2, and morbid obesity      Social history: Patient from SNF    Equipment owned: Wheelchair, [facility], and [address] [place] [address] [address] Mobility Inpatient   How much difficulty turning over in bed?: A Lot  How much difficulty sitting down on / standing up from a chair with arms?: Unable  How much difficulty moving from lying on back to sitting on side of bed?: A Lot  How much help from another person moving to and from a bed to a chair?: Total  How much help from another person needed to walk in hospital room?: Total  How much help from another person for climbing 3-5 steps with a railing?: Total  AM-PAC Inpatient Mobility Raw Score : 8  AM-PAC Inpatient T-Scale Score : 28.52  Mobility Inpatient CMS 0-100% Score: 86.62  Mobility Inpatient CMS G-Code Modifier : CM    Nursing cleared patient for PT treatment. OBJECTIVE;   Initial Evaluation  Date: 1/19/2023 Treatment Date:  2/1/2023       Short Term/ Long Term   Goals   Was pt agreeable to Eval/treatment? Yes yes To be met in 3 days   Pain level   0/10   0/10    Bed Mobility    Rolling: Maximal assist of 1    Supine to sit: Maximal assist of 1    Sit to supine: Maximal assist of 1    Scooting: Maximal assist of 1   Rolling: Maximal assist of 1   Supine to sit: Not assessed    Sit to supine: Not assessed    Scooting: Dependent assist of 1 to head of bed    Rolling: Minimal assist of 1    Supine to sit: Minimal assist of 1    Sit to supine: Minimal assist of 1    Scooting: Minimal assist of 1     Transfers Sit to stand: Not assessed  d/t fair- seated balance and pt declining standing Sit to stand: Not assessed  pt declined      Sit to stand:  Moderate assist of 1    Ambulation    not assessed     > 15 feet using  wheeled walker with Moderate assist of 1    Stair negotiation: ascended and descended   Not assessed           ROM Within functional limits    Increase range of motion 10% of affected joints    Strength BUE:  refer to OT eval  RLE:  4-/5  LLE:  4-/5  Increase strength in affected mm groups by 1/3 grade   Balance Sitting EOB:  fair -  Dynamic Standing:  not assessed  Sitting EOB: not assessed patient declined   Dynamic Standing: not assessed    Sitting EOB:  fair   Dynamic Standing: fair - with [facility]     Patient is Alert & Oriented x person, place, time, and situation and follows directions    Sensation:  Patient  denies numbness/tingling   Edema:  no   Endurance: poor+    Vitals: Heated high flow 55FiO2 55L   Blood Pressure at rest  Blood Pressure during session    Heart Rate at rest  Heart Rate during session    SPO2 at rest %  SPO2 during session %     Patient education  Patient educated on role of Physical Therapy, risks of immobility, safety and plan of care, energy conservation,  importance of mobility while in hospital , ankle pumps, quad set and glut set for edema control, blood clot prevention, safety , and positioning for skin integrity and comfort     Patient response to education:   Pt verbalized understanding Pt demonstrated skill Pt requires further education in this area   Yes Partial Yes      Treatment:  Patient practiced and was instructed/facilitated in the following treatment: Patient assisted with exercises in bed. Patient declined further function and assisted to head of bed and positioned for comfort. Pillow placed under R side for midline alignment. Therapeutic Exercises:  ankle pumps, quad sets, glut sets, hip abduction/adduction, and straight leg raise, x 10-15 reps. At end of session, patient in bed with  respiratory therapist  call light and phone within reach,  all lines and tubes intact, nursing notified. Patient would benefit from continued skilled Physical Therapy to improve functional independence and quality of life.          Patient's/ family goals   rehab    Time in     953  Time out   1026    Total Treatment Time  33 minutes      CPT codes:  Therapeutic activities (48270)   11 minutes  1 unit(s)  Therapeutic exercises (70923)   22 minutes  1 unit(s)    Ayden Brannon [facility] [place]  #[id number]

## 2023-02-01 NOTE — PROGRESS NOTES
Comprehensive Nutrition Assessment    Type and Reason for Visit:  Reassess    Nutrition Recommendations/Plan:   Continue current diet regimen & monitor. Malnutrition Assessment:  Malnutrition Status: At risk for malnutrition (Comment) (01/20/23 1432)    Context:  Chronic Illness     Findings of the 6 clinical characteristics of malnutrition:  Energy Intake:  No significant decrease in energy intake  Weight Loss:  Unable to assess     Body Fat Loss:  No significant body fat loss     Muscle Mass Loss:  No significant muscle mass loss    Fluid Accumulation:  Unable to assess     Strength:  Not Performed    Nutrition Assessment:    Pt admit w/ SHANTANU on CKD, Acute on chronic respiratory failure d/t COPD exacerbation and PNA, edema/anasarca chronic, pulmonary HTN. On fluid restriction, IV fluid resuscitation. PMHx of CHF, COPD, CKD, CAD, breast cancer, DM, SERENA, Parkinson's ds. Note pt still  in need of the bipap. Note 1/30/2023 complete opacification of the rt hemithorax r/t pleural effusion, pt unable to have thoracentesis 1/31 d/t  pro time/INR are elevated. Pt is on HTL w/ meal  intake ~< 50%. Continue current diet regimen & monitor. Nutrition Related Findings:    A/O x4, obese/soft/distended/nontender abd, +BS, flatus, I/O -6.7L, +2/+3 pitting edema, elevated renal labs, K+3. 0(L). Wound Type: Wound Consult Pending (R upper thigh redness, L pretibial popped blister)       Current Nutrition Intake & Therapies:    Average Meal Intake: 1-25%, 26-50%, %, 0%  Average Supplements Intake: Unable to assess  ADULT DIET; Regular; 3 carb choices (45 gm/meal); Low Fat/Low Chol/High Fiber/2 gm Na; Moderately Thick (Honey); 1800 ml  ADULT ORAL NUTRITION SUPPLEMENT; Dinner;  Other Oral Supplement; Gelatein 20    Anthropometric Measures:  Height: 5' (152.4 cm)  Ideal Body Weight (IBW): 100 lbs (45 kg)    Admission Body Weight: 262 lb 2 oz (118.9 kg) (1/17 bed scale)  Current Body Weight: 199 lb (90.3 kg) (02/01 bed), 273.3 % IBW. Weight Source: Bed Scale  Current BMI (kg/m2): 38.9  Usual Body Weight:  (JOHN d/t wt fluctuations in EMR)     Weight Adjustment For: No Adjustment    BMI Categories: Obese Class 3 (BMI 40.0 or greater)    Estimated Daily Nutrient Needs:  Energy Requirements Based On: Formula  Weight Used for Energy Requirements: Current  Energy (kcal/day): 1900-2000kcal/day  Weight Used for Protein Requirements: Ideal  Protein (g/day): 115-120g/day (2/5-2.7g/kg IBW)  Method Used for Fluid Requirements: 1 ml/kcal  Fluid (ml/day): per critical care (1800ml fluid restiction)    Nutrition Diagnosis:   Inadequate oral intake related to impaired respiratory function, cardiac dysfunction, renal dysfunction (multiple comorbidities/chronic illnesses) as evidenced by intake 51-75%, poor intake prior to admission    Nutrition Interventions:   Food and/or Nutrient Delivery: Continue Current Diet, Continue Oral Nutrition Supplement  Nutrition Education/Counseling: Education completed  Coordination of Nutrition Care: Continue to monitor while inpatient     Goals:     Goals: PO intake 75% or greater     Nutrition Monitoring and Evaluation:   Behavioral-Environmental Outcomes: None Identified  Food/Nutrient Intake Outcomes: Food and Nutrient Intake, Supplement Intake  Physical Signs/Symptoms Outcomes: Biochemical Data, Chewing or Swallowing, GI Status, Fluid Status or Edema, Weight, Skin, Nutrition Focused Physical Findings    Discharge Planning:     Too soon to determine     Norma Maldonado RD  Contact: [phone]

## 2023-02-01 NOTE — CARE COORDINATION
SS NOTE: COVID NEGATIVE 1/17, PT WILL NEED A RAPID NEGATIVE COVID TEST TO RETURN TO [facility]. Pt was hypotensive last night. Pt is on heated high flow at 50- FIO2 at 60%. Pt had a thoracentesis of the right pleural effusion today with 1000cc off. Pt has a peripheral line. She has a genao. She has a wound on her left lower leg. Palliative Care and Critical Care are seeing her. Pt is going to return to long term care at [address] [place] when dched. Pt is out of skilled days there and they will activate Medicaid for return there. For the return pt will need NO PRECERT, and will need a signed [place]. In regard to pt's concern that the Bipap here is more effective that the bipap at the [facility]. SW shared pt's bipap settings with [employer] [facility] and their settings was different- that set up the bipap there to be same setting as here. KRISHNA met with pt yesterday and completed Advanced Directives- copies were placed in pt's soft chart. Originals given to pt along with a copy for her daughter Aureliano Mcgowan and son Trina Toney. SS to continue. Shaji Blum [facility], [facility].2/1/2023. Tami Roy 453PM.

## 2023-02-01 NOTE — PROGRESS NOTES
Contacted covering provider for Dr. Jose D Sanchez (Dr Radha Nielson) about pt low bp of 84/50 manual and was told no new orders due to pt chronic low blood pressure and to continue to monitor.

## 2023-02-02 ENCOUNTER — APPOINTMENT (OUTPATIENT)
Dept: GENERAL RADIOLOGY | Age: 73
End: 2023-02-02
Payer: MEDICARE

## 2023-02-02 LAB
ANION GAP SERPL CALCULATED.3IONS-SCNC: 8 MMOL/L (ref 7–16)
BUN BLDV-MCNC: 53 MG/DL (ref 6–23)
CALCIUM SERPL-MCNC: 8.3 MG/DL (ref 8.6–10.2)
CHLORIDE BLD-SCNC: 99 MMOL/L (ref 98–107)
CO2: 38 MMOL/L (ref 22–29)
CREAT SERPL-MCNC: 2.2 MG/DL (ref 0.5–1)
GFR SERPL CREATININE-BSD FRML MDRD: 23 ML/MIN/1.73
GLUCOSE BLD-MCNC: 158 MG/DL (ref 74–99)
GRAM STAIN ORDERABLE: NORMAL
INR BLD: 1.6
METER GLUCOSE: 139 MG/DL (ref 74–99)
METER GLUCOSE: 166 MG/DL (ref 74–99)
METER GLUCOSE: 181 MG/DL (ref 74–99)
METER GLUCOSE: 223 MG/DL (ref 74–99)
POTASSIUM SERPL-SCNC: 3.7 MMOL/L (ref 3.5–5)
PROTHROMBIN TIME: 18.8 SEC (ref 9.3–12.4)
SODIUM BLD-SCNC: 145 MMOL/L (ref 132–146)

## 2023-02-02 PROCEDURE — 2580000003 HC RX 258: Performed by: INTERNAL MEDICINE

## 2023-02-02 PROCEDURE — 82962 GLUCOSE BLOOD TEST: CPT

## 2023-02-02 PROCEDURE — 99231 SBSQ HOSP IP/OBS SF/LOW 25: CPT | Performed by: NURSE PRACTITIONER

## 2023-02-02 PROCEDURE — 85610 PROTHROMBIN TIME: CPT

## 2023-02-02 PROCEDURE — 80048 BASIC METABOLIC PNL TOTAL CA: CPT

## 2023-02-02 PROCEDURE — 6370000000 HC RX 637 (ALT 250 FOR IP): Performed by: INTERNAL MEDICINE

## 2023-02-02 PROCEDURE — 97110 THERAPEUTIC EXERCISES: CPT | Performed by: PHYSICAL THERAPIST

## 2023-02-02 PROCEDURE — 71045 X-RAY EXAM CHEST 1 VIEW: CPT

## 2023-02-02 PROCEDURE — 6360000002 HC RX W HCPCS: Performed by: NURSE PRACTITIONER

## 2023-02-02 PROCEDURE — 36415 COLL VENOUS BLD VENIPUNCTURE: CPT

## 2023-02-02 PROCEDURE — 6360000002 HC RX W HCPCS: Performed by: INTERNAL MEDICINE

## 2023-02-02 PROCEDURE — 94640 AIRWAY INHALATION TREATMENT: CPT

## 2023-02-02 PROCEDURE — 6370000000 HC RX 637 (ALT 250 FOR IP): Performed by: NURSE PRACTITIONER

## 2023-02-02 PROCEDURE — 2500000003 HC RX 250 WO HCPCS: Performed by: INTERNAL MEDICINE

## 2023-02-02 PROCEDURE — 2700000000 HC OXYGEN THERAPY PER DAY

## 2023-02-02 PROCEDURE — 2060000000 HC ICU INTERMEDIATE R&B

## 2023-02-02 PROCEDURE — 99232 SBSQ HOSP IP/OBS MODERATE 35: CPT | Performed by: INTERNAL MEDICINE

## 2023-02-02 PROCEDURE — 97530 THERAPEUTIC ACTIVITIES: CPT | Performed by: PHYSICAL THERAPIST

## 2023-02-02 PROCEDURE — 94660 CPAP INITIATION&MGMT: CPT

## 2023-02-02 RX ORDER — BUMETANIDE 0.25 MG/ML
1 INJECTION, SOLUTION INTRAMUSCULAR; INTRAVENOUS ONCE
Status: COMPLETED | OUTPATIENT
Start: 2023-02-02 | End: 2023-02-02

## 2023-02-02 RX ADMIN — ROPINIROLE HYDROCHLORIDE 1 MG: 1 TABLET, FILM COATED ORAL at 08:28

## 2023-02-02 RX ADMIN — SUCRALFATE 1 G: 1 TABLET ORAL at 08:28

## 2023-02-02 RX ADMIN — IPRATROPIUM BROMIDE AND ALBUTEROL SULFATE 3 ML: .5; 2.5 SOLUTION RESPIRATORY (INHALATION) at 09:18

## 2023-02-02 RX ADMIN — ROPINIROLE HYDROCHLORIDE 1 MG: 1 TABLET, FILM COATED ORAL at 13:49

## 2023-02-02 RX ADMIN — PANTOPRAZOLE SODIUM 40 MG: 40 TABLET, DELAYED RELEASE ORAL at 06:20

## 2023-02-02 RX ADMIN — ACETAZOLAMIDE SODIUM 500 MG: 500 INJECTION, POWDER, LYOPHILIZED, FOR SOLUTION INTRAVENOUS at 13:59

## 2023-02-02 RX ADMIN — BUDESONIDE 500 MCG: 0.5 SUSPENSION RESPIRATORY (INHALATION) at 18:37

## 2023-02-02 RX ADMIN — ANTI-FUNGAL POWDER MICONAZOLE NITRATE TALC FREE: 1.42 POWDER TOPICAL at 09:18

## 2023-02-02 RX ADMIN — ROPINIROLE HYDROCHLORIDE 1 MG: 1 TABLET, FILM COATED ORAL at 22:37

## 2023-02-02 RX ADMIN — CARBIDOPA AND LEVODOPA 1 TABLET: 50; 200 TABLET, EXTENDED RELEASE ORAL at 22:37

## 2023-02-02 RX ADMIN — ATORVASTATIN CALCIUM 20 MG: 20 TABLET, FILM COATED ORAL at 22:37

## 2023-02-02 RX ADMIN — IPRATROPIUM BROMIDE AND ALBUTEROL SULFATE 3 ML: .5; 2.5 SOLUTION RESPIRATORY (INHALATION) at 04:43

## 2023-02-02 RX ADMIN — MIRTAZAPINE 7.5 MG: 15 TABLET, FILM COATED ORAL at 22:37

## 2023-02-02 RX ADMIN — MIDODRINE HYDROCHLORIDE 5 MG: 5 TABLET ORAL at 08:29

## 2023-02-02 RX ADMIN — MONTELUKAST SODIUM 10 MG: 10 TABLET, FILM COATED ORAL at 22:37

## 2023-02-02 RX ADMIN — BUMETANIDE 1 MG: 0.25 INJECTION, SOLUTION INTRAMUSCULAR; INTRAVENOUS at 15:08

## 2023-02-02 RX ADMIN — ARFORMOTEROL TARTRATE 15 MCG: 15 SOLUTION RESPIRATORY (INHALATION) at 18:37

## 2023-02-02 RX ADMIN — CARBIDOPA AND LEVODOPA 1 TABLET: 50; 200 TABLET, EXTENDED RELEASE ORAL at 17:26

## 2023-02-02 RX ADMIN — CARBIDOPA AND LEVODOPA 1 TABLET: 50; 200 TABLET, EXTENDED RELEASE ORAL at 08:29

## 2023-02-02 RX ADMIN — IPRATROPIUM BROMIDE AND ALBUTEROL SULFATE 3 ML: .5; 2.5 SOLUTION RESPIRATORY (INHALATION) at 18:37

## 2023-02-02 RX ADMIN — SUCRALFATE 1 G: 1 TABLET ORAL at 17:26

## 2023-02-02 RX ADMIN — SUCRALFATE 1 G: 1 TABLET ORAL at 13:49

## 2023-02-02 RX ADMIN — Medication 10 ML: at 22:40

## 2023-02-02 RX ADMIN — SERTRALINE 50 MG: 50 TABLET, FILM COATED ORAL at 08:29

## 2023-02-02 RX ADMIN — APIXABAN 5 MG: 5 TABLET, FILM COATED ORAL at 22:37

## 2023-02-02 RX ADMIN — BUDESONIDE 500 MCG: 0.5 SUSPENSION RESPIRATORY (INHALATION) at 04:43

## 2023-02-02 RX ADMIN — IPRATROPIUM BROMIDE AND ALBUTEROL SULFATE 3 ML: .5; 2.5 SOLUTION RESPIRATORY (INHALATION) at 14:49

## 2023-02-02 RX ADMIN — SUCRALFATE 1 G: 1 TABLET ORAL at 22:36

## 2023-02-02 RX ADMIN — ARFORMOTEROL TARTRATE 15 MCG: 15 SOLUTION RESPIRATORY (INHALATION) at 04:43

## 2023-02-02 RX ADMIN — Medication 10 ML: at 15:06

## 2023-02-02 NOTE — PROGRESS NOTES
[facility] MD Estefani Thompson MD Elyce Junior, MD Ortencia Linden, BRADLEY Devlin, NUNU Goodman CNP  Progress Note    2/2/2023    SUBJECTIVE:   1/20: Feeling little better today. Denies new complaint. Still tachypnea, though denies dyspnea no cp/palp Appetite ok ROS otherwise unremarkable    1//21 seen in her room on o2/nc bp stable weak poor po intake   2+ edema in LE     1/22 charts reviewed . On bipap    1/23 : on o2/nc . Still look hypervolemic     1/24 seen in her room comfortable o2/nc . Still with LE edema which seems to be multifactorial and will likely need to accept having some edema on board     1/25 sitting on edge of the bed working with pt . Still with considerable edema in LEs   BP stable     1/26 seen in her room reports of SOB with minimal activity , LE edema still signficant . 1/27 good UO On 5 L/o2/nc  Still with sob with activity Tachycardiac with low functional capacity     1/28: Asleep, resting and breathing comfortably on nasal cannula. Remains edematous. Ongoing fatigue and generalized weakness. 1/29: Hypotensive last evening, Bumex drip stopped, IV normal saline bolus administered to effect. Breathing little more easily though still on AVAPS. To be downgraded to CPAP shortly. Thirsty. Still markedly edematous. 1/30: Somnolent, though arousable. On CPAP. Ongoing fatigue and malaise with generalized weakness    1/31 seen in her room , skin wrinkling in LE on HFNC . BP on lower side  For thoracentesis today   Dw RN .     2/1: Seen while laying in bed, no acute distress. Tells me that she is thirsty. She is on heated high flow nasal canula. Feels that her breathing has improved slightly. Plan is for thoracentesis later this afternoon, could not be done yesterday due to elevated INR. Still with skin wrinkling to bilateral lower extremities.      2/2 1 L removed with thoracentesis   O2 requirement better on 8 L o2/nc Very weak and deconditioned   Labile BP numbers    PROBLEM LIST:    Principal Problem:    Acute on chronic respiratory failure with hypoxia and hypercapnia (HCC)  Active Problems:    Palliative care encounter  Resolved Problems:    * No resolved hospital problems. *         DIET:    ADULT DIET; Regular; 3 carb choices (45 gm/meal); Low Fat/Low Chol/High Fiber/2 gm Na; Moderately Thick (Honey); 1800 ml  ADULT ORAL NUTRITION SUPPLEMENT; Dinner;  Other Oral Supplement; Gelatein 20     MEDS (scheduled):    acetaZOLAMIDE (DIAMOX) IVPB  500 mg IntraVENous Once    bumetanide  1 mg IntraVENous Once    [Held by provider] dilTIAZem  60 mg Oral 3 times per day    sucralfate  1 g Oral 4x Daily    rOPINIRole  1 mg Oral TID    atorvastatin  20 mg Oral Nightly    ipratropium-albuterol  1 vial Inhalation 4x Daily    [Held by provider] insulin glargine  13 Units SubCUTAneous BID    [Held by provider] LORazepam  0.5 mg Oral Nightly    metoprolol succinate  25 mg Oral BID    insulin lispro  0-4 Units SubCUTAneous TID WC    insulin lispro  0-4 Units SubCUTAneous Nightly    budesonide  0.5 mg Nebulization BID    sodium chloride flush  10 mL IntraVENous 2 times per day    miconazole   Topical BID    arformoterol tartrate  15 mcg Nebulization BID    pantoprazole  40 mg Oral QAM AC    [Held by provider] apixaban  5 mg Oral BID    mirtazapine  7.5 mg Oral Nightly    [Held by provider] aspirin  81 mg Oral Daily    carbidopa-levodopa  1 tablet Oral TID    sertraline  50 mg Oral Daily    montelukast  10 mg Oral Nightly    midodrine  5 mg Oral TID WC       MEDS (infusions):   sodium chloride      dextrose         MEDS (prn):  sodium chloride, loperamide, sodium chloride flush, sodium chloride, promethazine **OR** ondansetron, polyethylene glycol, acetaminophen **OR** acetaminophen, glucose, dextrose bolus **OR** dextrose bolus, glucagon (rDNA), dextrose    PHYSICAL EXAM:     Patient Vitals for the past 24 hrs:   BP Temp Temp src Pulse Resp SpO2   02/02/23 0942 -- -- -- -- -- 96 %   02/02/23 0740 (!) 89/55 (!) 96.4 °F (35.8 °C) Axillary 85 22 100 %   02/02/23 0200 (!) 141/69 97.8 °F (36.6 °C) Axillary 98 24 95 %   02/02/23 0112 -- -- -- -- 25 --   02/01/23 2210 136/88 98.1 °F (36.7 °C) Axillary (!) 108 24 97 %   02/01/23 2138 -- -- -- -- 25 --   02/01/23 1807 -- -- -- -- 21 --   02/01/23 1731 (!) 111/59 98.4 °F (36.9 °C) Oral (!) 110 22 94 %   02/01/23 1615 -- -- -- -- 20 --   02/01/23 1614 -- -- -- -- -- (!) 87 %     @      Intake/Output Summary (Last 24 hours) at 2/2/2023 1102  Last data filed at 2/2/2023 0454  Gross per 24 hour   Intake 480 ml   Output 1250 ml   Net -770 ml           Wt Readings from Last 3 Encounters:   02/01/23 199 lb 5 oz (90.4 kg)   01/16/23 259 lb (117.5 kg)   01/16/23 259 lb (117.5 kg)     Age-appropriate morbidly obese white woman, though easily arousable, no apparent distress  NC/AT EOMI sclera conjunctive are clear and anicteric mucous membranes are moist  Neck soft supple trachea midline  Lungs clear to ausculation bilaterally, diminished in the bases. Poor inspiratory effort. Regular rhythm normal S1 and S2  Abdomen soft nontender nondistended normal active bowel sounds. Abdominal pannus has substantial edema  Distal extremities, thighs, sacrum have substantial chronic type nonpitting edema, +1 BLE edema, though with skin wrinkling   Pulses 1+ x4  Moves all 4 extremities  Cranial nerves II through XII grossly intact  Awake, alert, interactive and appropriate  Skin tone consistent with chronic venous stasis distally      DATA:    No results for input(s): WBC, HGB, HCT, MCV, PLT in the last 72 hours.       Recent Labs     01/31/23  0651 02/01/23  0916 02/02/23  0929    144 145   K 3.0* 3.2* 3.7   CL 98 96* 99   CO2 39* 40* 38*   MG  --  1.6  --    PHOS 4.5  --   --    BUN 61* 57* 53*   CREATININE 2.3* 2.2* 2.2*         Lab Results   Component Value Date    LABPROT 0.5 (H) 01/18/2023    LABPROT 0.5 01/18/2023     On CT no hydro/signs of obstruction     Urine studies  U Na 21, U Cl 23 U prot:cr ratio 0.5    ASSESSMENT / RECOMMENDATIONS:       Assessment  1. Acute kidney injury urine lytes support decrease effective volume     2. CKD stage III, Baseline creatinine 1.4 to 1.7 mg/dL, secondary to diabetic nephropathy and renal microvascular atherosclerotic disease  0.5 g proteinuria     3. Anemia due to CKD, phlebotomy associated prolonged hospitalization     4. Acute hypercapnic and hypoxic respiratory failure due to COPD exacerbation and pneumonia. Does not appear hypervolemic. 5.  Morbid obesity, BMI 50.6 kg per metered square     6. Edema/anasarca, chronic, multifactorial, due to venous insufficiency in the setting of morbid obesity, relative immobility, likely diastolic dysfunction though it was \"indeterminate\" on echo, the does have pulmonary hypertension, mild right-sided heart failure    7.   Hypotension       Creatinine stable   1 L removed with thoracentesis       Recommendations    One dose bumex along with diamox   PT/OT   Nutrition support   Bmp in am   Electronically signed by Hermelindo Rojsa MD on 2/2/2023

## 2023-02-02 NOTE — CARE COORDINATION
SS NOTE: COVID NEGATIVE 1/17, PT WILL NEED A RAPID NEGATIVE COVID TEST TO RETURN TO [facility]. Pt is on heated high flow at 50- FIO2 at 60%. Pt had a thoracentesis of the right pleural effusion yesterday with 1000cc off. Pt has a peripheral line. She has a genao. She has a wound on her left lower leg. Palliative Care and Critical Care are seeing her. Pt is going to return to long term care at [address] when dched. Pt is out of skilled days there and they will activate Medicaid for return there. For the return pt will need NO PRECERT, and will need a signed [place]. In regard to pt's concern that the Bipap here is more effective that the bipap at the [facility]. SW shared pt's bipap settings with [facility] and their settings was different- that set up the bipap there to be same setting as here. SW met with pt yesterday and completed Advanced Directives- copies were placed in pt's soft chart. Originals given to pt along with a copy for her daughter Andrew Stone and son Kelsey Bear. SS to continue. ESTHER Dinero.2/2/2023.11:58PM.        ADDENDUM TO ABOVE KRISHNA NOTE: SW investigating whether pt has criteria for LTAC- will report findings. ESTHER Dinero.2/2/2023.11:57 AM.

## 2023-02-02 NOTE — PROGRESS NOTES
Palliative Care Department  [phone]  Palliative Care Progress Note  Provider Joycelyn Martínez, APRN - CNP     Snati Covington  [id number]  Hospital Day: 16  Date of Initial Consult: 1/30/2023  Referring Provider: Xavier Rubin MD   Palliative Medicine was consulted for assistance with: Goals of care, CODE STATUS discussion    HPI:   Santi Covington is a 67 y.o. with a medical history of CHF, A. fib, anxiety, asthma, CAD, breast CA, COPD, depression, diabetes, hyperlipidemia, hypertension, SERENA, Parkinson's disease who was admitted on 1/17/2023 from nursing facility with a CHIEF COMPLAINT of hypotension, weight gain, leg swelling. Patient states that she is having shortness of breath. She wears 2 to 3 L O2 via nasal cannula at baseline. Patient was at CHF clinic and was hypotensive and had a 4 pound weight gain over the past week. In ED, patient was found to be hypotensive with A. fib RVR and respiratory failure was placed on BiPAP. Chest x-ray showed stable right pleural effusion. Patient had a repeat chest x-ray 1/30/2023 which showed complete opacification of the right hemithorax related to pleural effusion. Modest contralateral infiltrate ileum and/or atelectasis at the left lower chest.  Thoracentesis ordered for today. Pulmonology, ID, nephrology following. Palliative medicine consulted for further assistance. ASSESSMENT/PLAN:     Pertinent Hospital Diagnoses     Acute on chronic respiratory failure with hypoxia  Congestive heart failure  Atrial fibrillation  Urinary tract infection  Acute on chronic kidney disease      Palliative Care Encounter / Counseling Regarding Goals of Care  Please see detailed goals of care discussion as below  At this time, Santi Covington, Does have capacity for medical decision-making.   Capacity is time limited and situation/question specific  During encounter Gagan Johnson was surrogate medical decision-maker  Outcome of goals of care meeting:   Continue current medical management  Discussed CODE STATUS options  Code status Full Code  Advanced Directives: no POA or living will in epic  Surrogate/Legal NOK:  Chinedu Clinton (child) [phone]  Angelita Patino (child) [phone]  Darlyn Wheat (child) [phone]    Spiritual assessment: no spiritual distress identified  Bereavement and grief: to be determined  Referrals to: none today  SUBJECTIVE:     Current medical issues leading to Palliative Medicine involvement include   Active Hospital Problems    Diagnosis Date Noted    Palliative care encounter [Z51.5] 01/31/2023     Priority: Medium    Acute on chronic respiratory failure with hypoxia and hypercapnia ([facility] [address] [U78.05, J96.22] 01/17/2023     Priority: Medium       Details of Conversation:    Chart reviewed. Patient seen at bedside, alert and oriented x4. Patient able to make decisions for herself and able to partake in meaningful conversation. No family present at bedside. Reintroduced self and role of palliative medicine. Discussed patient's current condition and comorbidities. Patient has been weaned from [address] and transitioned to 8 L high flow nasal cannula today. Patient states she feels much better after thoracentesis, removed 1 L. Discussed goals of care and CODE STATUS options. Patient states in the event of cardiopulmonary arrest, she wishes to be resuscitated. Although, she does state that she would not want long-term life support. Patient and her daughter, Estelita Sims, filled out living will and healthcare power of  documents. Mike Mcdonnell is patient's primary decision-maker and Asher Roberts is listed as secondary decision-maker. Continue full CODE STATUS and current medical management. We will monitor clinical progression, follow oxygenation level, hopeful to wean her down further. Patient will either be discharged to [facility] versus nursing facility. All questions and concerns addressed. Palliative medicine to follow.       OBJECTIVE:   Prognosis: Guarded    Physical Exam:  BP (!) 161/95   Pulse (!) 107   Temp (!) 96.4 °F (35.8 °C) (Axillary)   Resp 24   Ht 5' (1.524 m)   Wt 199 lb 5 oz (90.4 kg)   SpO2 98%   BMI 38.93 kg/m²   Constitutional:  Obese, awake, alert  Eyes: no scleral icterus, normal lids, no discharge  ENMT:  Normocephalic, atraumatic, mucosa moist, EOMI  Neck:  trachea midline, no JVD  Lungs:  Diminished  Heart[de-identified]  Irregular  Abd:  Soft, obese, non tender, distended, bowel sounds present  Ext:  Weakness, +edema, pulses present  Skin:  Warm and dry, impaired skin integrity  Psych: anxious  Neuro:  A&Ox4, following commands    Objective data reviewed: labs, images, records, medication use, vitals, and chart    Discussed patient and the plan of care with the other IDT members: Palliative Medicine IDT Team, Floor Nurse, and Patient    Time/Communication  Greater than 50% of time spent, total 25 minutes in counseling and coordination of care at the bedside regarding goals of care and diagnosis and prognosis. Thank you for allowing Palliative Medicine to participate in the care of Irina Belle

## 2023-02-02 NOTE — PROGRESS NOTES
Department of Internal Medicine  General Internal Medicine  Attending Progress Note  Chief Complaint   Patient presents with    Respiratory Distress     Normally on 3L NC, came in on CPAP, given total of 50mcg push dose epi for low BP by EMS     SUBJECTIVE:    Continued significant weaning of FIO2. Even LESS sob. No diarrhea.     OBJECTIVE      Medications    Current Facility-Administered Medications: acetaZOLAMIDE (DIAMOX) 500 mg in sodium chloride 0.9 % 100 mL IVPB, 500 mg, IntraVENous, Once  bumetanide (BUMEX) injection 1 mg, 1 mg, IntraVENous, Once  sodium chloride (OCEAN, BABY AYR) 0.65 % nasal spray 1 spray, 1 spray, Each Nostril, PRN  [Held by provider] dilTIAZem (CARDIZEM) tablet 60 mg, 60 mg, Oral, 3 times per day  sucralfate (CARAFATE) tablet 1 g, 1 g, Oral, 4x Daily  rOPINIRole (REQUIP) tablet 1 mg, 1 mg, Oral, TID  atorvastatin (LIPITOR) tablet 20 mg, 20 mg, Oral, Nightly  ipratropium-albuterol (DUONEB) nebulizer solution 3 mL, 1 vial, Inhalation, 4x Daily  [Held by provider] insulin glargine (LANTUS) injection vial 13 Units, 13 Units, SubCUTAneous, BID  loperamide (IMODIUM) capsule 2 mg, 2 mg, Oral, 4x Daily PRN  [Held by provider] LORazepam (ATIVAN) tablet 0.5 mg, 0.5 mg, Oral, Nightly  metoprolol succinate (TOPROL XL) extended release tablet 25 mg, 25 mg, Oral, BID  insulin lispro (HUMALOG) injection vial 0-4 Units, 0-4 Units, SubCUTAneous, TID WC  insulin lispro (HUMALOG) injection vial 0-4 Units, 0-4 Units, SubCUTAneous, Nightly  budesonide (PULMICORT) nebulizer suspension 500 mcg, 0.5 mg, Nebulization, BID  sodium chloride flush 0.9 % injection 10 mL, 10 mL, IntraVENous, 2 times per day  sodium chloride flush 0.9 % injection 10 mL, 10 mL, IntraVENous, PRN  0.9 % sodium chloride infusion, , IntraVENous, PRN  promethazine (PHENERGAN) tablet 12.5 mg, 12.5 mg, Oral, Q6H PRN **OR** ondansetron (ZOFRAN) injection 4 mg, 4 mg, IntraVENous, Q6H PRN  polyethylene glycol (GLYCOLAX) packet 17 g, 17 g, Oral,  Daily PRN  acetaminophen (TYLENOL) tablet 650 mg, 650 mg, Oral, Q6H PRN **OR** acetaminophen (TYLENOL) suppository 650 mg, 650 mg, Rectal, Q6H PRN  miconazole (MICOTIN) 2 % powder, , Topical, BID  arformoterol tartrate (BROVANA) nebulizer solution 15 mcg, 15 mcg, Nebulization, BID  pantoprazole (PROTONIX) tablet 40 mg, 40 mg, Oral, QAM AC  glucose chewable tablet 16 g, 4 tablet, Oral, PRN  dextrose bolus 10% 125 mL, 125 mL, IntraVENous, PRN **OR** dextrose bolus 10% 250 mL, 250 mL, IntraVENous, PRN  glucagon (rDNA) injection 1 mg, 1 mg, SubCUTAneous, PRN  dextrose 10 % infusion, , IntraVENous, Continuous PRN  [Held by provider] apixaban (ELIQUIS) tablet 5 mg, 5 mg, Oral, BID  mirtazapine (REMERON) tablet 7.5 mg, 7.5 mg, Oral, Nightly  [Held by provider] aspirin EC tablet 81 mg, 81 mg, Oral, Daily  carbidopa-levodopa (SINEMET CR)  MG per extended release tablet 1 tablet, 1 tablet, Oral, TID  sertraline (ZOLOFT) tablet 50 mg, 50 mg, Oral, Daily  montelukast (SINGULAIR) tablet 10 mg, 10 mg, Oral, Nightly  midodrine (PROAMATINE) tablet 5 mg, 5 mg, Oral, TID WC  Physical    VITALS:  BP (!) 161/95   Pulse (!) 107   Temp (!) 96.4 °F (35.8 °C) (Axillary)   Resp 24   Ht 5' (1.524 m)   Wt 199 lb 5 oz (90.4 kg)   SpO2 98%   BMI 38.93 kg/m²   No acute distress moist membranes   Normocephalic, without obvious abnormality, atraumatic, external ears without lesions,   Neck supple no cervical lymphadenopathy  Heart has a regular rate and rhythm no murmur  Lungs are clear to auscultation bilaterally with equal movements. Abdomen is soft nontender nondistended no rebound or guarding. No significant peripheral   Anasarca  good peripheral perfusion. No significant rashes or new skin lesions. No new focality on neuro exam which is overall grossly intact.   Affect and mood seem to be appropriate     Data    CBC:   Lab Results   Component Value Date/Time    WBC 7.2 01/30/2023 05:00 AM    RBC 2.98 01/30/2023 05:00 AM    HGB 7.7 01/30/2023 05:00 AM    HCT 26.6 01/30/2023 05:00 AM    MCV 89.3 01/30/2023 05:00 AM    MCH 25.8 01/30/2023 05:00 AM    MCHC 28.9 01/30/2023 05:00 AM    RDW 16.2 01/30/2023 05:00 AM    PLT 75 01/30/2023 05:00 AM    MPV 11.6 01/30/2023 05:00 AM     BMP:    Lab Results   Component Value Date/Time     02/02/2023 09:29 AM    K 3.7 02/02/2023 09:29 AM    K 3.2 02/01/2023 09:16 AM    CL 99 02/02/2023 09:29 AM    CO2 38 02/02/2023 09:29 AM    BUN 53 02/02/2023 09:29 AM    LABALBU 3.0 01/30/2023 05:00 AM    CREATININE 2.2 02/02/2023 09:29 AM    CALCIUM 8.3 02/02/2023 09:29 AM    GFRAA >60 10/16/2022 09:20 AM    LABGLOM 23 02/02/2023 09:29 AM    GLUCOSE 158 02/02/2023 09:29 AM       ASSESSMENT AND PLAN    Summary of stay: This is a 67 y.o. female  has a past medical history of A-fib ([facility] [address] Acute on chronic congestive heart failure ([facility] [address] Anxiety, Asthma, CAD (coronary artery disease), Cancer ([facility] [address] Chronic kidney disease, COPD exacerbation ([address] Depression, Diabetes mellitus ([address] H/O mammogram, Hx MRSA infection, Hyperlipidemia, Hypertension, Lateral epicondylitis, Morbid obesity ([address] SERENA on CPAP, Oxygen dependent, Parkinson's disease ([address] and Tubal ligation status. presented with SOB, hypotension  for last few days prior to arrival to the hospital. SOB is associated with some cough, nonproductive but no fever or chills reported. Initially SOB was mild, intermittent but gradually getting more persistent. Started gradually. Exacerbated by general activity or exertion. Relieved only partially by rest. In ED patient was found to be hypotensive with Afib RVR and respiratory failure and was placed on BIPAP. BP improved after midodrine dose. The patient was seen and examined at bedside, appears alert and awake with no acute distress and is able to answer simple  questions.  On direct questioning, patient denied any  resting ongoing chest pain, hemoptysis, productive cough, fever, ongoing palpitation, active abdominal pain, hematemesis, rectal bleeding, blessing, hematuria, any other  and GI complaints, and any new focal neuro deficits     Acute on chronic respiratory failure with hypoxia and hypercapnia & Obesity hypoventilation syndrome: s/p Bumex Drip per Nephro. Per pulm. Congestive heart failure: decompensated. Anasarca. Diuresis per nephro. Thoracentesis done by IR on 2/1: 1000cc of clear yellow color pleural fluid drained  Atrial Fibrillation: restart Eliquis 2/2. Was on hold for thorocoentesis   UTI: vre on cx s/p zyvox stopped by ID  Acute on chronic Kidney disease:  Renal function is stable with crt of 2.3  Dm type 2: Insulin held due to Hypoglycemia   Parkinson disease:  sinemet cr. Pulm Htn: Pulm following  Low K: again on 2/1 supplement and recheck in am, phos high normal 1/31 mg low normal 1/30. Recheck mag today  DVT prophylaxis: Lovenox  Full code. Palliative care consult on going. Some increased bp after discussion with them on 2/2.  Prior to that I complimented her on improving re: use of oxygen in last 2 days but advised her to make plans for the future  Disposition: community skilled

## 2023-02-02 NOTE — PROGRESS NOTES
met with patient. Patient stated she was better and looking forward to going to a new place to get better and stronger.  offered a listening presence, nurtured hope, and offered prayer. Spiritual care is ongoing.

## 2023-02-02 NOTE — CARE COORDINATION
SS NOTE: COVID NEGATIVE 1/17, PT WILL NEED A RAPID NEGATIVE COVID TEST TO [facility]URN TO [facility]. Pt DOES HAVE LTACH criteria. SW discussed with pt and she agrees with going to [facility]. Referral completed to Liaison from [facility] [place] - they submitted for [place] today. KRISHNA discussed with pt's dtr Aureliano Mcgowan today and she too is in agreement. Pt is on 8 liters of O2 at this writing. ESTHER Eagle.2/2/2023.3:11PM.

## 2023-02-02 NOTE — PROGRESS NOTES
Physical Therapy  Physical Therapy Treatment Note/Plan of Care    Room #:  7838/3470-05  Patient Name: Bertha Paredes  YOB: 1950  MRN: [id number]    Date of Service: 2/2/2023     Tentative placement recommendation: Subacute Rehab  Equipment recommendation:  To be determined      Evaluating Physical Therapist: Colby Shone, PT, DPT #[id number]      Specific Provider Orders/Date/Referring Provider :     01/19/23 0745    PT eval and treat  Start:  01/19/23 0745,   End:  01/19/23 0745,   ONE TIME,   Standing Count:  1 Occurrences,   Donovan Nieto MD Acknowledge New     Admitting Diagnosis:   NSTEMI (non-ST elevated myocardial infarction) [facility]) [I21.4]  Atrial fibrillation with rapid ventricular response (HCC) [I48.91]  Recurrent right pleural effusion [J90]  Hypotension, unspecified hypotension type [I95.9]  Acute on chronic respiratory failure with hypoxia and hypercapnia (HCC) [J96.21, J96.22]      Surgery: none  Visit Diagnoses         Codes    Recurrent right pleural effusion     J90    NSTEMI (non-ST elevated myocardial infarction) ([facility] [address]     I21.4    Postprocedural pneumothorax     J95.811            Patient Active Problem List   Diagnosis    Depression with anxiety    Osteoporosis    Asthma    Hyperlipidemia    Mitral regurgitation    Obstructive sleep apnea syndrome    Psoriasis    Diabetes mellitus ([address]    Parkinson's disease ([address]    Primary hypertension    Microalbuminuria    Morbid obesity (HCC)    RLS (restless legs syndrome)    Generalized weakness    Inability to walk    Hypothyroidism    Chest pain    Acute asthma exacerbation    Asthma exacerbation, mild    Paroxysmal atrial fibrillation (HCC)    Atrial fibrillation with rapid ventricular response (HCC)    Acute on chronic congestive heart failure ([address]    Coronary artery disease involving native coronary artery of native heart without angina pectoris    Dysphagia    Hepatosplenomegaly    Acute decompensated heart failure ([facility] [address]    Acute diastolic (congestive) heart failure (HCC)    Nonrheumatic tricuspid valve regurgitation    Tobacco abuse    Recurrent syncope    Septic shock (HCC)    Seizure-like activity (HCC)    Acute kidney injury ([facility] [address]    Pancytopenia (HCC)    Thrombocytopenia (HCC)    Encephalopathy    Acute respiratory failure with hypoxia (HCC)    Lactic acidosis    Delirium    Acute on chronic anemia    Pleural effusion, right    Acute respiratory failure with hypoxia and hypercapnia (HCC)    Acute confusion    Acute on chronic diastolic heart failure (HCC)    Macrocytosis    Other specified anemias    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[facility] [address]    Puerperal sepsis with acute hypoxic respiratory failure without septic shock (HCC)    Pulmonary HTN (HCC)    Chronic anticoagulation    RVF (right ventricular failure) (HCC)    Metabolic alkalosis    Sepsis (HCC)    COPD exacerbation (HCC)    Acute on chronic respiratory failure with hypercapnia (HCC)    Persistent atrial fibrillation (HCC)    Hypercapnic respiratory failure (HCC)    Acute on chronic respiratory failure (HCC)    Hyperkalemia    Heart failure (HCC)    Acute renal insufficiency    Hypotension    Anemia    Acute on chronic respiratory failure with hypoxia and hypercapnia (HCC)    Palliative care encounter        ASSESSMENT of Current Deficits Patient exhibits decreased strength, balance, and endurance impairing functional mobility, transfers, gait , gait distance, and tolerance to activity. Patient fatigues quickly, limiting further function. Patient agreeable to standing with ModA but was unable to take any steps. Pt performed seated exercises sitting EOB with VC for technique.        PHYSICAL THERAPY  PLAN OF CARE       Physical therapy plan of care is established based on physician order,  patient diagnosis and clinical assessment    Current Treatment Recommendations:    -Bed Mobility: Lower extremity exercises  and Trunk control activities   -Sitting Balance: Incorporate reaching activities to activate trunk muscles , Hands on support to maintain midline , Facilitate active trunk muscle engagement , Facilitate postural control in all planes , and Engage in core activities to allow for movement within base of support   -Standing Balance: Perform strengthening exercises in standing to promote motor control with or without upper extremity support , Instruct patient on adequate base of support to maintain balance, and Challenge balance utilizing reaching  activities beyond center of gravity    -Transfers: Provide instruction on proper hand and foot position for adequate transfer of weight onto lower extremities and use of gait device if needed, Cues for hand placement, technique and safety. Provide stabilization to prevent fall , Facilitate weight shift forward on to lower extremities and provide necessary stabilization of bilateral lower extremities , Support transfer of weight on to lower extremities, and Assist with extension of knees trunk and hip to accept weight transfer   -Gait: Gait training, Standing activities to improve: base of support, weight shift, weight bearing , Exercises to improve trunk control, Exercises to improve hip and knee control, Performance of protected weight bearing activities, and Activities to increase weight bearing   -Endurance: Utilize Supervised activities to increase level of endurance to allow for safe functional mobility including transfers and gait  and Use graduated activities to promote good breathing techniques and provide support and education to maximize respiratory function    PT long term treatment goals are located in below grid    Patient and or family understand(s) diagnosis, prognosis, and plan of care. Frequency of treatments: Patient will be seen  3-5 times/week.          Prior Level of Function: Patient ambulated with wheeled walker for short distances  Rehab Potential: fair + for baseline    Past medical history: Past Medical History:   Diagnosis Date    A-fib [facility])     Acute on chronic congestive heart failure (HCC)     Anxiety     Asthma     CAD (coronary artery disease) 01/21/2016    Cancer ([facility] [address]  breast ca 2006    right lumpectomy    Chronic kidney disease     nephrolithiasis    COPD exacerbation ([facility] [address] 10/12/2022    Depression     Diabetes mellitus ([facility] [address]     H/O mammogram     Hx MRSA infection     toe infection january 2012    Hyperlipidemia     Hypertension     Lateral epicondylitis     Morbid obesity (HCC)     SERENA on CPAP     Oxygen dependent     Parkinson's disease ([facility] [address]     Tubal ligation status      Past Surgical History:   Procedure Laterality Date    BREAST LUMPECTOMY      BREAST REDUCTION SURGERY      CARDIAC CATHETERIZATION  4/28/2014    Dr. Gilma Cavazos  01/11/2022    Dr. Rachael Coulter  7/29/15    CT GUIDED CHEST TUBE  7/8/2022    CT GUIDED CHEST TUBE 7/8/2022 Amelia Farooq MD [facility]    ENDOSCOPY, COLON, DIAGNOSTIC  7/19/15    GALLBLADDER SURGERY      LUMBAR LAMINECTOMY      TOE AMPUTATION      TONSILLECTOMY      UPPER GASTROINTESTINAL ENDOSCOPY      UPPER GASTROINTESTINAL ENDOSCOPY N/A 7/19/2022    EGD ESOPHAGOGASTRODUODENOSCOPY performed by Gurmeet Winston MD at [address]:    Precautions:  Up with assistance, falls, O2, and morbid obesity      Social history: Patient from SNF    Equipment owned: Wheelchair, Wheeled Walker, and [id number] - Inpatient   How much help is needed turning from your back to your side while in a flat bed without using bedrails?: A Lot  How much help is needed moving from lying on your back to sitting on the side of a flat bed without using bedrails?: A Lot  How much help is needed moving to and from a bed to a chair?: Total  How much help is needed standing up from a chair using your arms?: A Lot  How much help is needed walking in hospital room?: Total  How much help is needed climbing 3-5 steps with a railing?: Total  AM-PAC Inpatient Mobility Raw Score : 9  AM-PAC Inpatient T-Scale Score : 30.55  Mobility Inpatient CMS 0-100% Score: 81.38  Mobility Inpatient CMS G-Code Modifier : CM    Nursing cleared patient for PT treatment. OBJECTIVE;   Initial Evaluation  Date: 1/19/2023 Treatment Date:  2/2/2023       Short Term/ Long Term   Goals   Was pt agreeable to Eval/treatment? Yes yes To be met in 3 days   Pain level   0/10   0/10    Bed Mobility    Rolling: Maximal assist of 1    Supine to sit: Maximal assist of 1    Sit to supine: Maximal assist of 1    Scooting: Maximal assist of 1   Rolling: Moderate assist of 1   Supine to sit: Moderate assist of 1   Sit to supine: Moderate assist of 1   Scooting: Moderate assist of 1    Rolling: Minimal assist of 1    Supine to sit: Minimal assist of 1    Sit to supine: Minimal assist of 1    Scooting: Minimal assist of 1     Transfers Sit to stand: Not assessed  d/t fair- seated balance and pt declining standing Sit to stand: Mod/MaxA       Sit to stand:  Moderate assist of 1    Ambulation    not assessed  not assessed unable   > 15 feet using  wheeled walker with Moderate assist of 1    Stair negotiation: ascended and descended   Not assessed           ROM Within functional limits    Increase range of motion 10% of affected joints    Strength BUE:  refer to OT eval  RLE:  4-/5  LLE:  4-/5  Increase strength in affected mm groups by 1/3 grade   Balance Sitting EOB:  fair -  Dynamic Standing:  not assessed  Sitting EOB: fair   Dynamic Standing: not assessed unable   Sitting EOB:  fair   Dynamic Standing: fair - with [employer]     Patient is Alert & Oriented x person, place, time, and situation and follows directions    Sensation:  Patient  denies numbness/tingling   Edema:  no   Endurance: poor+    Vitals:  8 liters nasal cannula   Blood Pressure at rest  Blood Pressure during session    Heart Rate at rest  Heart Rate during session    SPO2 at rest %  SPO2 during session 83-98%     Patient education  Patient educated on role of Physical Therapy, risks of immobility, safety and plan of care, energy conservation,  importance of mobility while in hospital , ankle pumps, quad set and glut set for edema control, blood clot prevention, safety , and positioning for skin integrity and comfort     Patient response to education:   Pt verbalized understanding Pt demonstrated skill Pt requires further education in this area   Yes Partial Yes      Treatment:  Patient practiced and was instructed/facilitated in the following treatment: Patient performed bed mobility, transfers, seated exercises sitting EOB. Therapeutic Exercises:  ankle pumps, heel raises, long arc quad, and seated marching 10-15 reps      At end of session, patient in bed with     call light and phone within reach,  all lines and tubes intact, nursing notified. Patient would benefit from continued skilled Physical Therapy to improve functional independence and quality of life.          Patient's/ family goals   rehab    Time in     1050  Time out   1128    Total Treatment Time  38 minutes      CPT codes:  Therapeutic activities (93192)   23 minutes  2 unit(s)  Therapeutic exercises (33821)   15 minutes  1 unit(s)    Carley Taylor PT License Number [id number]

## 2023-02-03 ENCOUNTER — HOSPITAL ENCOUNTER (OUTPATIENT)
Dept: OTHER | Age: 73
Discharge: HOME | End: 2023-02-03

## 2023-02-03 LAB
ANION GAP SERPL CALCULATED.3IONS-SCNC: 7 MMOL/L (ref 7–16)
BUN BLDV-MCNC: 49 MG/DL (ref 6–23)
CALCIUM SERPL-MCNC: 8 MG/DL (ref 8.6–10.2)
CHLORIDE BLD-SCNC: 96 MMOL/L (ref 98–107)
CO2: 40 MMOL/L (ref 22–29)
CREAT SERPL-MCNC: 2.1 MG/DL (ref 0.5–1)
GFR SERPL CREATININE-BSD FRML MDRD: 24 ML/MIN/1.73
GLUCOSE BLD-MCNC: 131 MG/DL (ref 74–99)
INR BLD: 2.1
METER GLUCOSE: 147 MG/DL (ref 74–99)
METER GLUCOSE: 167 MG/DL (ref 74–99)
METER GLUCOSE: 174 MG/DL (ref 74–99)
METER GLUCOSE: 184 MG/DL (ref 74–99)
POTASSIUM SERPL-SCNC: 3.7 MMOL/L (ref 3.5–5)
PROTHROMBIN TIME: 23.7 SEC (ref 9.3–12.4)
SODIUM BLD-SCNC: 143 MMOL/L (ref 132–146)

## 2023-02-03 PROCEDURE — 6360000002 HC RX W HCPCS: Performed by: NURSE PRACTITIONER

## 2023-02-03 PROCEDURE — 80048 BASIC METABOLIC PNL TOTAL CA: CPT

## 2023-02-03 PROCEDURE — 2580000003 HC RX 258: Performed by: INTERNAL MEDICINE

## 2023-02-03 PROCEDURE — 2700000000 HC OXYGEN THERAPY PER DAY

## 2023-02-03 PROCEDURE — 2060000000 HC ICU INTERMEDIATE R&B

## 2023-02-03 PROCEDURE — 6370000000 HC RX 637 (ALT 250 FOR IP): Performed by: INTERNAL MEDICINE

## 2023-02-03 PROCEDURE — 82962 GLUCOSE BLOOD TEST: CPT

## 2023-02-03 PROCEDURE — 36415 COLL VENOUS BLD VENIPUNCTURE: CPT

## 2023-02-03 PROCEDURE — 94640 AIRWAY INHALATION TREATMENT: CPT

## 2023-02-03 PROCEDURE — 6370000000 HC RX 637 (ALT 250 FOR IP): Performed by: NURSE PRACTITIONER

## 2023-02-03 PROCEDURE — 6370000000 HC RX 637 (ALT 250 FOR IP): Performed by: FAMILY MEDICINE

## 2023-02-03 PROCEDURE — 99232 SBSQ HOSP IP/OBS MODERATE 35: CPT | Performed by: INTERNAL MEDICINE

## 2023-02-03 PROCEDURE — 94660 CPAP INITIATION&MGMT: CPT

## 2023-02-03 PROCEDURE — 2500000003 HC RX 250 WO HCPCS: Performed by: INTERNAL MEDICINE

## 2023-02-03 PROCEDURE — 85610 PROTHROMBIN TIME: CPT

## 2023-02-03 RX ORDER — BUMETANIDE 0.25 MG/ML
2 INJECTION, SOLUTION INTRAMUSCULAR; INTRAVENOUS 2 TIMES DAILY
Status: DISCONTINUED | OUTPATIENT
Start: 2023-02-03 | End: 2023-02-06

## 2023-02-03 RX ORDER — MIDODRINE HYDROCHLORIDE 5 MG/1
5 TABLET ORAL ONCE
Status: COMPLETED | OUTPATIENT
Start: 2023-02-03 | End: 2023-02-03

## 2023-02-03 RX ADMIN — Medication 10 ML: at 08:19

## 2023-02-03 RX ADMIN — SUCRALFATE 1 G: 1 TABLET ORAL at 21:34

## 2023-02-03 RX ADMIN — SUCRALFATE 1 G: 1 TABLET ORAL at 13:39

## 2023-02-03 RX ADMIN — MONTELUKAST SODIUM 10 MG: 10 TABLET, FILM COATED ORAL at 21:35

## 2023-02-03 RX ADMIN — Medication 10 ML: at 21:43

## 2023-02-03 RX ADMIN — CARBIDOPA AND LEVODOPA 1 TABLET: 50; 200 TABLET, EXTENDED RELEASE ORAL at 21:35

## 2023-02-03 RX ADMIN — ARFORMOTEROL TARTRATE 15 MCG: 15 SOLUTION RESPIRATORY (INHALATION) at 06:27

## 2023-02-03 RX ADMIN — ACETAMINOPHEN 650 MG: 325 TABLET ORAL at 10:14

## 2023-02-03 RX ADMIN — MIDODRINE HYDROCHLORIDE 5 MG: 5 TABLET ORAL at 21:35

## 2023-02-03 RX ADMIN — IPRATROPIUM BROMIDE AND ALBUTEROL SULFATE 3 ML: .5; 2.5 SOLUTION RESPIRATORY (INHALATION) at 09:52

## 2023-02-03 RX ADMIN — PANTOPRAZOLE SODIUM 40 MG: 40 TABLET, DELAYED RELEASE ORAL at 06:04

## 2023-02-03 RX ADMIN — CARBIDOPA AND LEVODOPA 1 TABLET: 50; 200 TABLET, EXTENDED RELEASE ORAL at 15:00

## 2023-02-03 RX ADMIN — SERTRALINE 50 MG: 50 TABLET, FILM COATED ORAL at 08:18

## 2023-02-03 RX ADMIN — BUDESONIDE 500 MCG: 0.5 SUSPENSION RESPIRATORY (INHALATION) at 18:18

## 2023-02-03 RX ADMIN — APIXABAN 5 MG: 5 TABLET, FILM COATED ORAL at 21:35

## 2023-02-03 RX ADMIN — CARBIDOPA AND LEVODOPA 1 TABLET: 50; 200 TABLET, EXTENDED RELEASE ORAL at 08:18

## 2023-02-03 RX ADMIN — ROPINIROLE HYDROCHLORIDE 1 MG: 1 TABLET, FILM COATED ORAL at 13:39

## 2023-02-03 RX ADMIN — IPRATROPIUM BROMIDE AND ALBUTEROL SULFATE 3 ML: .5; 2.5 SOLUTION RESPIRATORY (INHALATION) at 18:18

## 2023-02-03 RX ADMIN — BUMETANIDE 2 MG: 0.25 INJECTION, SOLUTION INTRAMUSCULAR; INTRAVENOUS at 21:41

## 2023-02-03 RX ADMIN — ROPINIROLE HYDROCHLORIDE 1 MG: 1 TABLET, FILM COATED ORAL at 21:34

## 2023-02-03 RX ADMIN — ROPINIROLE HYDROCHLORIDE 1 MG: 1 TABLET, FILM COATED ORAL at 08:18

## 2023-02-03 RX ADMIN — ANTI-FUNGAL POWDER MICONAZOLE NITRATE TALC FREE: 1.42 POWDER TOPICAL at 08:42

## 2023-02-03 RX ADMIN — MIRTAZAPINE 7.5 MG: 15 TABLET, FILM COATED ORAL at 21:34

## 2023-02-03 RX ADMIN — BUDESONIDE 500 MCG: 0.5 SUSPENSION RESPIRATORY (INHALATION) at 06:27

## 2023-02-03 RX ADMIN — METOPROLOL SUCCINATE 25 MG: 25 TABLET, FILM COATED, EXTENDED RELEASE ORAL at 08:18

## 2023-02-03 RX ADMIN — SUCRALFATE 1 G: 1 TABLET ORAL at 08:18

## 2023-02-03 RX ADMIN — ANTI-FUNGAL POWDER MICONAZOLE NITRATE TALC FREE: 1.42 POWDER TOPICAL at 21:36

## 2023-02-03 RX ADMIN — IPRATROPIUM BROMIDE AND ALBUTEROL SULFATE 3 ML: .5; 2.5 SOLUTION RESPIRATORY (INHALATION) at 14:04

## 2023-02-03 RX ADMIN — ATORVASTATIN CALCIUM 20 MG: 20 TABLET, FILM COATED ORAL at 21:34

## 2023-02-03 RX ADMIN — SUCRALFATE 1 G: 1 TABLET ORAL at 18:21

## 2023-02-03 RX ADMIN — ARFORMOTEROL TARTRATE 15 MCG: 15 SOLUTION RESPIRATORY (INHALATION) at 18:18

## 2023-02-03 RX ADMIN — APIXABAN 5 MG: 5 TABLET, FILM COATED ORAL at 08:18

## 2023-02-03 RX ADMIN — IPRATROPIUM BROMIDE AND ALBUTEROL SULFATE 3 ML: .5; 2.5 SOLUTION RESPIRATORY (INHALATION) at 06:27

## 2023-02-03 ASSESSMENT — PAIN DESCRIPTION - LOCATION: LOCATION: LEG

## 2023-02-03 ASSESSMENT — PAIN SCALES - GENERAL: PAINLEVEL_OUTOF10: 3

## 2023-02-03 ASSESSMENT — PAIN DESCRIPTION - DESCRIPTORS: DESCRIPTORS: THROBBING

## 2023-02-03 NOTE — PROGRESS NOTES
Pulmonary/Critical Care Progress Note    We are following patient for acute respiratory failure, reaccumulating right pleural effusion, diastolic dysfunction, COPD, diabetes mellitus type 2, morbid obesity, obstructive sleep apnea, Parkinson's disease, SHANTANU superimposed on CKD      SUBJECTIVE:  The patient's large right pleural effusion has been reaccumulating. I suspect that it will not matter much how much diuretics she receives because of the severity of the diastolic dysfunction which is causing constant reaccumulation of her large right pleural effusion which was tapped last week for over a liter of fluid. We will check a chest x-ray on 2/5/2023 and if fluid continues to reaccumulate, we will recommend a Pleurx catheter for her right pleural space.     MEDICATIONS:   [START ON 2/4/2023] acetaZOLAMIDE (DIAMOX) IVPB  500 mg IntraVENous Q12H    bumetanide  2 mg IntraVENous BID    [Held by provider] dilTIAZem  60 mg Oral 3 times per day    sucralfate  1 g Oral 4x Daily    rOPINIRole  1 mg Oral TID    atorvastatin  20 mg Oral Nightly    ipratropium-albuterol  1 vial Inhalation 4x Daily    [Held by provider] insulin glargine  13 Units SubCUTAneous BID    [Held by provider] LORazepam  0.5 mg Oral Nightly    metoprolol succinate  25 mg Oral BID    insulin lispro  0-4 Units SubCUTAneous TID WC    insulin lispro  0-4 Units SubCUTAneous Nightly    budesonide  0.5 mg Nebulization BID    sodium chloride flush  10 mL IntraVENous 2 times per day    miconazole   Topical BID    arformoterol tartrate  15 mcg Nebulization BID    pantoprazole  40 mg Oral QAM AC    apixaban  5 mg Oral BID    mirtazapine  7.5 mg Oral Nightly    [Held by provider] aspirin  81 mg Oral Daily    carbidopa-levodopa  1 tablet Oral TID    sertraline  50 mg Oral Daily    montelukast  10 mg Oral Nightly      sodium chloride      dextrose       sodium chloride, loperamide, sodium chloride flush, sodium chloride, promethazine **OR** ondansetron, polyethylene glycol, acetaminophen **OR** acetaminophen, glucose, dextrose bolus **OR** dextrose bolus, glucagon (rDNA), dextrose      REVIEW OF SYSTEMS:  Constitutional: Denies fever, weight loss, night sweats, and fatigue  Skin: Denies pigmentation, dark lesions, and rashes   HEENT: Denies hearing loss, tinnitus, ear drainage, epistaxis, sore throat, and hoarseness. Cardiovascular: Denies palpitations, chest pain, and chest pressure. Respiratory: Denies cough, dyspnea at rest, hemoptysis, apnea, and choking. Gastrointestinal: Denies nausea, vomiting, poor appetite, diarrhea, heartburn or reflux  Genitourinary: Denies dysuria, frequency, urgency or hematuria  Musculoskeletal: Denies myalgias, muscle weakness, and bone pain  Neurological: Denies dizziness, vertigo, headache, and focal weakness  Psychological: Denies anxiety and depression  Endocrine: Denies heat intolerance and cold intolerance  Hematopoietic/Lymphatic: Denies bleeding problems and blood transfusions    OBJECTIVE:  Vitals:    02/03/23 1404   BP:    Pulse:    Resp:    Temp:    SpO2: 98%     FiO2 : 60 %  O2 Flow Rate (L/min): 7 L/min  O2 Device: Nasal cannula    PHYSICAL EXAM:  Constitutional: No fever, chills, diaphoresis  Skin: Chronic venous stasis changes lower extremities  HEENT: Mucous membranes are moist  Neck: No JVD, lymphadenopathy, thyromegaly  Cardiovascular: S1, S2 regular. No S3 or rubs present  Respiratory: Decreased breath sounds right lower lung field, clear otherwise  Gastrointestinal: Markedly obese, soft, nontender  Genitourinary: No CVA tenderness  Extremities: No clubbing, cyanosis, or edema  Neurological: Awake, alert, oriented x3.   No evidence of focal motor or sensory deficits  Psychological: Less depressed than her usual degree    LABS:  WBC   Date Value Ref Range Status   01/30/2023 7.2 4.5 - 11.5 E9/L Final   01/26/2023 10.6 4.5 - 11.5 E9/L Final   01/22/2023 7.1 4.5 - 11.5 E9/L Final     Hemoglobin   Date Value Ref Range Status 01/30/2023 7.7 (L) 11.5 - 15.5 g/dL Final   01/26/2023 9.2 (L) 11.5 - 15.5 g/dL Final   01/22/2023 8.2 (L) 11.5 - 15.5 g/dL Final     Hematocrit   Date Value Ref Range Status   01/30/2023 26.6 (L) 34.0 - 48.0 % Final   01/26/2023 34.6 34.0 - 48.0 % Final   01/22/2023 30.8 (L) 34.0 - 48.0 % Final     MCV   Date Value Ref Range Status   01/30/2023 89.3 80.0 - 99.9 fL Final   01/26/2023 94.3 80.0 - 99.9 fL Final   01/22/2023 93.1 80.0 - 99.9 fL Final     Platelets   Date Value Ref Range Status   01/30/2023 75 (L) 130 - 450 E9/L Final   01/26/2023 169 130 - 450 E9/L Final   01/22/2023 189 130 - 450 E9/L Final     Sodium   Date Value Ref Range Status   02/03/2023 143 132 - 146 mmol/L Final   02/02/2023 145 132 - 146 mmol/L Final   02/01/2023 144 132 - 146 mmol/L Final     Potassium   Date Value Ref Range Status   02/03/2023 3.7 3.5 - 5.0 mmol/L Final   02/02/2023 3.7 3.5 - 5.0 mmol/L Final   01/31/2023 3.0 (L) 3.5 - 5.0 mmol/L Final     Potassium reflex Magnesium   Date Value Ref Range Status   02/01/2023 3.2 (L) 3.5 - 5.0 mmol/L Final   01/20/2023 4.8 3.5 - 5.0 mmol/L Final   01/19/2023 4.9 3.5 - 5.0 mmol/L Final     Chloride   Date Value Ref Range Status   02/03/2023 96 (L) 98 - 107 mmol/L Final   02/02/2023 99 98 - 107 mmol/L Final   02/01/2023 96 (L) 98 - 107 mmol/L Final     CO2   Date Value Ref Range Status   02/03/2023 40 (H) 22 - 29 mmol/L Final   02/02/2023 38 (H) 22 - 29 mmol/L Final   02/01/2023 40 (H) 22 - 29 mmol/L Final     BUN   Date Value Ref Range Status   02/03/2023 49 (H) 6 - 23 mg/dL Final   02/02/2023 53 (H) 6 - 23 mg/dL Final   02/01/2023 57 (H) 6 - 23 mg/dL Final     Creatinine   Date Value Ref Range Status   02/03/2023 2.1 (H) 0.5 - 1.0 mg/dL Final   02/02/2023 2.2 (H) 0.5 - 1.0 mg/dL Final   02/01/2023 2.2 (H) 0.5 - 1.0 mg/dL Final     Glucose   Date Value Ref Range Status   02/03/2023 131 (H) 74 - 99 mg/dL Final   02/02/2023 158 (H) 74 - 99 mg/dL Final   02/01/2023 146 (H) 74 - 99 mg/dL Final Calcium   Date Value Ref Range Status   02/03/2023 8.0 (L) 8.6 - 10.2 mg/dL Final   02/02/2023 8.3 (L) 8.6 - 10.2 mg/dL Final   02/01/2023 8.3 (L) 8.6 - 10.2 mg/dL Final     Total Protein   Date Value Ref Range Status   01/30/2023 5.0 (L) 6.4 - 8.3 g/dL Final   01/17/2023 6.1 (L) 6.4 - 8.3 g/dL Final   01/16/2023 6.3 (L) 6.4 - 8.3 g/dL Final     Albumin   Date Value Ref Range Status   01/30/2023 3.0 (L) 3.5 - 5.2 g/dL Final   01/17/2023 3.6 3.5 - 5.2 g/dL Final   01/16/2023 3.8 3.5 - 5.2 g/dL Final     Total Bilirubin   Date Value Ref Range Status   01/30/2023 0.3 0.0 - 1.2 mg/dL Final   01/17/2023 0.5 0.0 - 1.2 mg/dL Final   01/16/2023 0.4 0.0 - 1.2 mg/dL Final     Alkaline Phosphatase   Date Value Ref Range Status   01/30/2023 60 35 - 104 U/L Final   01/17/2023 98 35 - 104 U/L Final   01/16/2023 96 35 - 104 U/L Final     AST   Date Value Ref Range Status   01/30/2023 8 0 - 31 U/L Final   01/17/2023 9 0 - 31 U/L Final   01/16/2023 10 0 - 31 U/L Final     ALT   Date Value Ref Range Status   01/30/2023 <5 0 - 32 U/L Final   01/17/2023 <5 0 - 32 U/L Final   01/16/2023 <5 0 - 32 U/L Final     Est, Glom Filt Rate   Date Value Ref Range Status   02/03/2023 24 >=60 mL/min/1.73 Final     Comment:     Pediatric calculator link  [employer] org/professionals/kdoqi/gfr_calculatorped  Effective Oct 3, 2022  These results are not intended for use in patients  <25years of age. eGFR results are calculated without  a race factor using the 2021 CKD-EPI equation. Careful  clinical correlation is recommended, particularly when  comparing to results calculated using previous equations. The CKD-EPI equation is less accurate in patients with  extremes of muscle mass, extra-renal metabolism of  creatinine, excessive creatinine ingestion, or following  therapy that affects renal tubular secretion.      02/02/2023 23 >=60 mL/min/1.73 Final     Comment:     Pediatric calculator alia [employer].at. org/professionals/ChipCare[employer]/gfr_calculatorped  Effective Oct 3, 2022  These results are not intended for use in patients  <25years of age. eGFR results are calculated without  a race factor using the 2021 CKD-EPI equation. Careful  clinical correlation is recommended, particularly when  comparing to results calculated using previous equations. The CKD-EPI equation is less accurate in patients with  extremes of muscle mass, extra-renal metabolism of  creatinine, excessive creatinine ingestion, or following  therapy that affects renal tubular secretion. 02/01/2023 23 >=60 mL/min/1.73 Final     Comment:     Pediatric calculator link  [employer]at. org/professionals/ChipCareoqi/gfr_calculatorped  Effective Oct 3, 2022  These results are not intended for use in patients  <25years of age. eGFR results are calculated without  a race factor using the 2021 CKD-EPI equation. Careful  clinical correlation is recommended, particularly when  comparing to results calculated using previous equations. The CKD-EPI equation is less accurate in patients with  extremes of muscle mass, extra-renal metabolism of  creatinine, excessive creatinine ingestion, or following  therapy that affects renal tubular secretion. GFR    Date Value Ref Range Status   10/16/2022 >60  Final   10/14/2022 59  Final   10/13/2022 >60  Final     Magnesium   Date Value Ref Range Status   02/01/2023 1.6 1.6 - 2.6 mg/dL Final   01/30/2023 1.6 1.6 - 2.6 mg/dL Final   01/29/2023 1.7 1.6 - 2.6 mg/dL Final     Phosphorus   Date Value Ref Range Status   01/31/2023 4.5 2.5 - 4.5 mg/dL Final   01/30/2023 5.0 (H) 2.5 - 4.5 mg/dL Final   01/29/2023 4.8 (H) 2.5 - 4.5 mg/dL Final     No results for input(s): PH, PO2, PCO2, HCO3, BE, O2SAT in the last 72 hours. RADIOLOGY:  XR CHEST PORTABLE   Final Result   1.  New opacification seen within the right lung base which could represent   partial collapse of the right lower lobe   2. Reaccumulating right pleural effusion   3. Right upper lobe airspace disease   4. There is no pneumothorax   5. The left lung is clear. XR CHEST 1 VIEW   Final Result   Improved ventilation of the right lung, status post thoracentesis with   possible tiny less than 5% right apical pneumothorax. .      Cavitary lesion in the right midlung field which may either represent a   necrotic malignancy versus pneumatosis. Consider CT scan. IR GUIDED THORACENTESIS PLEURAL   Final Result   Successful ultrasound guided thoracentesis. XR CHEST PORTABLE   Final Result   Complete opacification of the right hemithorax related to pleural effusion. Modest contralateral infiltrate and or atelectasis at the left lower chest.         CT ABDOMEN PELVIS WO CONTRAST Additional Contrast? None   Final Result   No nephroureterolithiasis or hydronephrosis. Splenomegaly. Small volume of perihepatic ascites. At least moderate-sized partially imaged right pleural effusion with   bibasilar atelectasis. Several locules of gas which appear to be within the endometrium at the level   of the uterine fundus, of uncertain etiology or clinical significance. Please correlate clinically. Diffuse anasarca throughout the soft tissues. Cardiomegaly. XR CHEST PORTABLE   Final Result   Stable right pleural effusion. PROBLEM LIST:  Principal Problem:    Acute on chronic respiratory failure with hypoxia and hypercapnia (HCC)  Active Problems:    Palliative care encounter  Resolved Problems:    * No resolved hospital problems. *      IMPRESSION:  Reaccumulating right pleural effusion secondary to diastolic dysfunction  Cor pulmonale  SHANTANU (slight further improvement)  Acute, superimposed on chronic, hypoxemic and hypercapnic respiratory failure  Obstructive sleep apnea on BiPAP    PLAN:  Chest x-ray in 2 days.   If fluid continues to accumulate, Pleurx catheter would be the best short and long-term solution  Diuretics per nephrology      Electronically signed by Rufina Marshall MD on 2/3/2023 at 4:42 PM

## 2023-02-03 NOTE — PROGRESS NOTES
Department of Internal Medicine  General Internal Medicine  Attending Progress Note  Chief Complaint   Patient presents with    Respiratory Distress     Normally on 3L NC, came in on CPAP, given total of 50mcg push dose epi for low BP by EMS     SUBJECTIVE:    She feels better but still sob.   I don't see any further incremental improvement    OBJECTIVE      Medications    Current Facility-Administered Medications: [START ON 2/4/2023] acetaZOLAMIDE (DIAMOX) 500 mg in sodium chloride 0.9 % 100 mL IVPB, 500 mg, IntraVENous, Q12H  bumetanide (BUMEX) injection 2 mg, 2 mg, IntraVENous, BID  sodium chloride (OCEAN, BABY AYR) 0.65 % nasal spray 1 spray, 1 spray, Each Nostril, PRN  [Held by provider] dilTIAZem (CARDIZEM) tablet 60 mg, 60 mg, Oral, 3 times per day  sucralfate (CARAFATE) tablet 1 g, 1 g, Oral, 4x Daily  rOPINIRole (REQUIP) tablet 1 mg, 1 mg, Oral, TID  atorvastatin (LIPITOR) tablet 20 mg, 20 mg, Oral, Nightly  ipratropium-albuterol (DUONEB) nebulizer solution 3 mL, 1 vial, Inhalation, 4x Daily  [Held by provider] insulin glargine (LANTUS) injection vial 13 Units, 13 Units, SubCUTAneous, BID  loperamide (IMODIUM) capsule 2 mg, 2 mg, Oral, 4x Daily PRN  [Held by provider] LORazepam (ATIVAN) tablet 0.5 mg, 0.5 mg, Oral, Nightly  metoprolol succinate (TOPROL XL) extended release tablet 25 mg, 25 mg, Oral, BID  insulin lispro (HUMALOG) injection vial 0-4 Units, 0-4 Units, SubCUTAneous, TID WC  insulin lispro (HUMALOG) injection vial 0-4 Units, 0-4 Units, SubCUTAneous, Nightly  budesonide (PULMICORT) nebulizer suspension 500 mcg, 0.5 mg, Nebulization, BID  sodium chloride flush 0.9 % injection 10 mL, 10 mL, IntraVENous, 2 times per day  sodium chloride flush 0.9 % injection 10 mL, 10 mL, IntraVENous, PRN  0.9 % sodium chloride infusion, , IntraVENous, PRN  promethazine (PHENERGAN) tablet 12.5 mg, 12.5 mg, Oral, Q6H PRN **OR** ondansetron (ZOFRAN) injection 4 mg, 4 mg, IntraVENous, Q6H PRN  polyethylene glycol (GLYCOLAX) packet 17 g, 17 g, Oral, Daily PRN  acetaminophen (TYLENOL) tablet 650 mg, 650 mg, Oral, Q6H PRN **OR** acetaminophen (TYLENOL) suppository 650 mg, 650 mg, Rectal, Q6H PRN  miconazole (MICOTIN) 2 % powder, , Topical, BID  arformoterol tartrate (BROVANA) nebulizer solution 15 mcg, 15 mcg, Nebulization, BID  pantoprazole (PROTONIX) tablet 40 mg, 40 mg, Oral, QAM AC  glucose chewable tablet 16 g, 4 tablet, Oral, PRN  dextrose bolus 10% 125 mL, 125 mL, IntraVENous, PRN **OR** dextrose bolus 10% 250 mL, 250 mL, IntraVENous, PRN  glucagon (rDNA) injection 1 mg, 1 mg, SubCUTAneous, PRN  dextrose 10 % infusion, , IntraVENous, Continuous PRN  apixaban (ELIQUIS) tablet 5 mg, 5 mg, Oral, BID  mirtazapine (REMERON) tablet 7.5 mg, 7.5 mg, Oral, Nightly  [Held by provider] aspirin EC tablet 81 mg, 81 mg, Oral, Daily  carbidopa-levodopa (SINEMET CR)  MG per extended release tablet 1 tablet, 1 tablet, Oral, TID  sertraline (ZOLOFT) tablet 50 mg, 50 mg, Oral, Daily  montelukast (SINGULAIR) tablet 10 mg, 10 mg, Oral, Nightly  Physical    VITALS:  BP 85/73   Pulse (!) 109   Temp 97.9 °F (36.6 °C) (Oral)   Resp 16   Ht 5' (1.524 m)   Wt 199 lb 5 oz (90.4 kg)   SpO2 98%   BMI 38.93 kg/m²   No acute distress moist membranes   Normocephalic, without obvious abnormality, atraumatic, external ears without lesions,   Neck supple no cervical lymphadenopathy  Heart has a regular rate and rhythm no murmur  Lungs are clear to auscultation bilaterally with equal movements. Abdomen is soft nontender nondistended no rebound or guarding. No significant peripheral   Anasarca  good peripheral perfusion. No significant rashes or new skin lesions. No new focality on neuro exam which is overall grossly intact.   Affect and mood seem to be appropriate     Data    CBC:   Lab Results   Component Value Date/Time    WBC 7.2 01/30/2023 05:00 AM    RBC 2.98 01/30/2023 05:00 AM    HGB 7.7 01/30/2023 05:00 AM    HCT 26.6 01/30/2023 05:00 AM    MCV 89.3 01/30/2023 05:00 AM    MCH 25.8 01/30/2023 05:00 AM    MCHC 28.9 01/30/2023 05:00 AM    RDW 16.2 01/30/2023 05:00 AM    PLT 75 01/30/2023 05:00 AM    MPV 11.6 01/30/2023 05:00 AM     BMP:    Lab Results   Component Value Date/Time     02/03/2023 05:25 AM    K 3.7 02/03/2023 05:25 AM    K 3.2 02/01/2023 09:16 AM    CL 96 02/03/2023 05:25 AM    CO2 40 02/03/2023 05:25 AM    BUN 49 02/03/2023 05:25 AM    LABALBU 3.0 01/30/2023 05:00 AM    CREATININE 2.1 02/03/2023 05:25 AM    CALCIUM 8.0 02/03/2023 05:25 AM    GFRAA >60 10/16/2022 09:20 AM    LABGLOM 24 02/03/2023 05:25 AM    GLUCOSE 131 02/03/2023 05:25 AM       ASSESSMENT AND PLAN    Summary of stay: This is a 67 y.o. female  has a past medical history of A-fib ([address] Acute on chronic congestive heart failure ([address] Anxiety, Asthma, CAD (coronary artery disease), Cancer ([address] Chronic kidney disease, COPD exacerbation ([address] Depression, Diabetes mellitus ([address] H/O mammogram, Hx MRSA infection, Hyperlipidemia, Hypertension, Lateral epicondylitis, Morbid obesity ([address] SERENA on CPAP, Oxygen dependent, Parkinson's disease ([address] and Tubal ligation status. presented with SOB, hypotension  for last few days prior to arrival to the hospital. SOB is associated with some cough, nonproductive but no fever or chills reported. Initially SOB was mild, intermittent but gradually getting more persistent. Started gradually. Exacerbated by general activity or exertion. Relieved only partially by rest. In ED patient was found to be hypotensive with Afib RVR and respiratory failure and was placed on BIPAP. BP improved after midodrine dose. The patient was seen and examined at bedside, appears alert and awake with no acute distress and is able to answer simple  questions.  On direct questioning, patient denied any  resting ongoing chest pain, hemoptysis, productive cough, fever, ongoing palpitation, active abdominal pain, hematemesis, rectal bleeding, blessing, hematuria, any other  and GI complaints, and any new focal neuro deficits     Acute on chronic respiratory failure with hypoxia and hypercapnia & Obesity hypoventilation syndrome: s/p Bumex Drip per Nephro. Per pulm. High flow o2   Congestive heart failure: decompensated. Anasarca. Diuresis per nephro. Thoracentesis done by IR on 2/1: 1000cc of clear yellow color pleural fluid drained  Atrial Fibrillation: restart Eliquis 2/2. Was on hold for thorocoentesis   UTI: vre on cx s/p zyvox stopped by ID  Acute on chronic Kidney disease:  Renal function is stable with crt of 2.1 on 2/3  Dm type 2: Insulin held due to Hypoglycemia   Parkinson disease:  sinemet cr. Pulm Htn: Pulm following  S/p hypokalemia   DVT prophylaxis: Lovenox  Full code. Palliative care confirmed full code.    Disposition: community skilled vs LTACH

## 2023-02-03 NOTE — CARE COORDINATION
SS NOTE: COVID NEGATIVE 1/17, PT WILL NEED A RAPID NEGATIVE COVID TEST TO RETURN TO [facility]. According to liaison from [address]  [address] [place]. KRISHNA discussed with pt and she agrees with going to [facility]. Referral completed to Liaison from [facility] - they are investigated insurance for coverage and began [address] for the [facility]. . KRISHNA discussed with pt's dtr Lilliana yesterday and she too is in agreement. Pt is on 8 liters of O2 and bipap at night at this writing. ESTHER Mazariegos.2/3/2023.12:14PM.

## 2023-02-03 NOTE — PLAN OF CARE
Problem: Discharge Planning  Goal: Discharge to home or other facility with appropriate resources  Outcome: Progressing     Problem: Respiratory - Adult  Goal: Achieves optimal ventilation and oxygenation  Outcome: Progressing     Problem: Cardiovascular - Adult  Goal: Maintains optimal cardiac output and hemodynamic stability  Outcome: Progressing  Goal: Absence of cardiac dysrhythmias or at baseline  Outcome: Progressing     Problem: Genitourinary - Adult  Goal: Absence of urinary retention  Outcome: Progressing  Goal: Urinary catheter remains patent  Outcome: Progressing     Problem: Chronic Conditions and Co-morbidities  Goal: Patient's chronic conditions and co-morbidity symptoms are monitored and maintained or improved  Outcome: Progressing     Problem: Skin/Tissue Integrity  Goal: Absence of new skin breakdown  Description: 1. Monitor for areas of redness and/or skin breakdown  2. Assess vascular access sites hourly  3. Every 4-6 hours minimum:  Change oxygen saturation probe site  4. Every 4-6 hours:  If on nasal continuous positive airway pressure, respiratory therapy assess nares and determine need for appliance change or resting period.   Outcome: Progressing     Problem: ABCDS Injury Assessment  Goal: Absence of physical injury  Outcome: Progressing     Problem: Pain  Goal: Verbalizes/displays adequate comfort level or baseline comfort level  Outcome: Progressing  Flowsheets  Taken 2/2/2023 1715  Verbalizes/displays adequate comfort level or baseline comfort level:   Encourage patient to monitor pain and request assistance   Assess pain using appropriate pain scale  Taken 2/2/2023 1330  Verbalizes/displays adequate comfort level or baseline comfort level:   Encourage patient to monitor pain and request assistance   Assess pain using appropriate pain scale  Taken 2/2/2023 0740  Verbalizes/displays adequate comfort level or baseline comfort level:   Encourage patient to monitor pain and request assistance   Assess pain using appropriate pain scale     Problem: Nutrition Deficit:  Goal: Optimize nutritional status  Outcome: Progressing

## 2023-02-03 NOTE — PROGRESS NOTES
Palliative Medicine  Progress Note    Goals of care and code status have been discuss. She wishes to continue with her current plan of care, currently pursuing LTAC placement. She also wishes to remain a full code. There are no further PM needs at this time. PM will now sign off. If new PM needs arise, please re-consult. Thank you. Manoj Rodriguez APRN-CNP  Palliative Medicine    Note: This report was completed using computerRawporter voiced recognition software. Every effort has been made to ensure accuracy; however, inadvertent computerized transcription errors may be present.

## 2023-02-03 NOTE — PLAN OF CARE
Problem: Discharge Planning  Goal: Discharge to home or other facility with appropriate resources  Outcome: Progressing     Problem: Respiratory - Adult  Goal: Achieves optimal ventilation and oxygenation  Outcome: Progressing     Problem: Cardiovascular - Adult  Goal: Maintains optimal cardiac output and hemodynamic stability  Outcome: Progressing  Goal: Absence of cardiac dysrhythmias or at baseline  Outcome: Progressing     Problem: Genitourinary - Adult  Goal: Absence of urinary retention  Outcome: Progressing  Goal: Urinary catheter remains patent  Outcome: Progressing

## 2023-02-04 LAB
ANION GAP SERPL CALCULATED.3IONS-SCNC: 6 MMOL/L (ref 7–16)
BODY FLUID CULTURE, STERILE: NORMAL
BUN BLDV-MCNC: 49 MG/DL (ref 6–23)
CALCIUM SERPL-MCNC: 8.3 MG/DL (ref 8.6–10.2)
CHLORIDE BLD-SCNC: 95 MMOL/L (ref 98–107)
CO2: 41 MMOL/L (ref 22–29)
CREAT SERPL-MCNC: 2.3 MG/DL (ref 0.5–1)
GFR SERPL CREATININE-BSD FRML MDRD: 22 ML/MIN/1.73
GLUCOSE BLD-MCNC: 125 MG/DL (ref 74–99)
GRAM STAIN RESULT: NORMAL
METER GLUCOSE: 142 MG/DL (ref 74–99)
METER GLUCOSE: 194 MG/DL (ref 74–99)
METER GLUCOSE: 203 MG/DL (ref 74–99)
METER GLUCOSE: 210 MG/DL (ref 74–99)
POTASSIUM SERPL-SCNC: 3.4 MMOL/L (ref 3.5–5)
SODIUM BLD-SCNC: 142 MMOL/L (ref 132–146)

## 2023-02-04 PROCEDURE — 2700000000 HC OXYGEN THERAPY PER DAY

## 2023-02-04 PROCEDURE — 99232 SBSQ HOSP IP/OBS MODERATE 35: CPT | Performed by: INTERNAL MEDICINE

## 2023-02-04 PROCEDURE — 94640 AIRWAY INHALATION TREATMENT: CPT

## 2023-02-04 PROCEDURE — 6360000002 HC RX W HCPCS: Performed by: INTERNAL MEDICINE

## 2023-02-04 PROCEDURE — 36415 COLL VENOUS BLD VENIPUNCTURE: CPT

## 2023-02-04 PROCEDURE — 97530 THERAPEUTIC ACTIVITIES: CPT

## 2023-02-04 PROCEDURE — 6370000000 HC RX 637 (ALT 250 FOR IP): Performed by: INTERNAL MEDICINE

## 2023-02-04 PROCEDURE — 82962 GLUCOSE BLOOD TEST: CPT

## 2023-02-04 PROCEDURE — 6360000002 HC RX W HCPCS: Performed by: NURSE PRACTITIONER

## 2023-02-04 PROCEDURE — 2580000003 HC RX 258: Performed by: INTERNAL MEDICINE

## 2023-02-04 PROCEDURE — 6370000000 HC RX 637 (ALT 250 FOR IP): Performed by: NURSE PRACTITIONER

## 2023-02-04 PROCEDURE — 2060000000 HC ICU INTERMEDIATE R&B

## 2023-02-04 PROCEDURE — 2500000003 HC RX 250 WO HCPCS: Performed by: INTERNAL MEDICINE

## 2023-02-04 PROCEDURE — 94660 CPAP INITIATION&MGMT: CPT

## 2023-02-04 PROCEDURE — 80048 BASIC METABOLIC PNL TOTAL CA: CPT

## 2023-02-04 PROCEDURE — 97110 THERAPEUTIC EXERCISES: CPT

## 2023-02-04 RX ADMIN — Medication 10 ML: at 20:29

## 2023-02-04 RX ADMIN — ARFORMOTEROL TARTRATE 15 MCG: 15 SOLUTION RESPIRATORY (INHALATION) at 06:21

## 2023-02-04 RX ADMIN — ACETAZOLAMIDE SODIUM 500 MG: 500 INJECTION, POWDER, LYOPHILIZED, FOR SOLUTION INTRAVENOUS at 15:48

## 2023-02-04 RX ADMIN — MONTELUKAST SODIUM 10 MG: 10 TABLET, FILM COATED ORAL at 20:27

## 2023-02-04 RX ADMIN — APIXABAN 5 MG: 5 TABLET, FILM COATED ORAL at 10:12

## 2023-02-04 RX ADMIN — CARBIDOPA AND LEVODOPA 1 TABLET: 50; 200 TABLET, EXTENDED RELEASE ORAL at 20:28

## 2023-02-04 RX ADMIN — ROPINIROLE HYDROCHLORIDE 1 MG: 1 TABLET, FILM COATED ORAL at 20:28

## 2023-02-04 RX ADMIN — CARBIDOPA AND LEVODOPA 1 TABLET: 50; 200 TABLET, EXTENDED RELEASE ORAL at 10:12

## 2023-02-04 RX ADMIN — SODIUM CHLORIDE: 9 INJECTION, SOLUTION INTRAVENOUS at 02:22

## 2023-02-04 RX ADMIN — Medication 10 ML: at 10:20

## 2023-02-04 RX ADMIN — ARFORMOTEROL TARTRATE 15 MCG: 15 SOLUTION RESPIRATORY (INHALATION) at 18:10

## 2023-02-04 RX ADMIN — ANTI-FUNGAL POWDER MICONAZOLE NITRATE TALC FREE: 1.42 POWDER TOPICAL at 20:25

## 2023-02-04 RX ADMIN — PANTOPRAZOLE SODIUM 40 MG: 40 TABLET, DELAYED RELEASE ORAL at 10:13

## 2023-02-04 RX ADMIN — MIRTAZAPINE 7.5 MG: 15 TABLET, FILM COATED ORAL at 20:27

## 2023-02-04 RX ADMIN — METOPROLOL SUCCINATE 25 MG: 25 TABLET, FILM COATED, EXTENDED RELEASE ORAL at 20:27

## 2023-02-04 RX ADMIN — CARBIDOPA AND LEVODOPA 1 TABLET: 50; 200 TABLET, EXTENDED RELEASE ORAL at 15:54

## 2023-02-04 RX ADMIN — IPRATROPIUM BROMIDE AND ALBUTEROL SULFATE 3 ML: .5; 2.5 SOLUTION RESPIRATORY (INHALATION) at 09:19

## 2023-02-04 RX ADMIN — ACETAZOLAMIDE SODIUM 500 MG: 500 INJECTION, POWDER, LYOPHILIZED, FOR SOLUTION INTRAVENOUS at 02:23

## 2023-02-04 RX ADMIN — ATORVASTATIN CALCIUM 20 MG: 20 TABLET, FILM COATED ORAL at 20:27

## 2023-02-04 RX ADMIN — SERTRALINE 50 MG: 50 TABLET, FILM COATED ORAL at 10:13

## 2023-02-04 RX ADMIN — ROPINIROLE HYDROCHLORIDE 1 MG: 1 TABLET, FILM COATED ORAL at 10:12

## 2023-02-04 RX ADMIN — APIXABAN 5 MG: 5 TABLET, FILM COATED ORAL at 20:27

## 2023-02-04 RX ADMIN — ROPINIROLE HYDROCHLORIDE 1 MG: 1 TABLET, FILM COATED ORAL at 15:56

## 2023-02-04 RX ADMIN — BUMETANIDE 2 MG: 0.25 INJECTION, SOLUTION INTRAMUSCULAR; INTRAVENOUS at 10:29

## 2023-02-04 RX ADMIN — BUDESONIDE 500 MCG: 0.5 SUSPENSION RESPIRATORY (INHALATION) at 18:10

## 2023-02-04 RX ADMIN — IPRATROPIUM BROMIDE AND ALBUTEROL SULFATE 3 ML: .5; 2.5 SOLUTION RESPIRATORY (INHALATION) at 06:21

## 2023-02-04 RX ADMIN — SUCRALFATE 1 G: 1 TABLET ORAL at 20:27

## 2023-02-04 RX ADMIN — IPRATROPIUM BROMIDE AND ALBUTEROL SULFATE 3 ML: .5; 2.5 SOLUTION RESPIRATORY (INHALATION) at 14:41

## 2023-02-04 RX ADMIN — ANTI-FUNGAL POWDER MICONAZOLE NITRATE TALC FREE: 1.42 POWDER TOPICAL at 10:19

## 2023-02-04 RX ADMIN — BUMETANIDE 2 MG: 0.25 INJECTION, SOLUTION INTRAMUSCULAR; INTRAVENOUS at 20:29

## 2023-02-04 RX ADMIN — INSULIN LISPRO 1 UNITS: 100 INJECTION, SOLUTION INTRAVENOUS; SUBCUTANEOUS at 12:44

## 2023-02-04 RX ADMIN — SUCRALFATE 1 G: 1 TABLET ORAL at 18:35

## 2023-02-04 RX ADMIN — SUCRALFATE 1 G: 1 TABLET ORAL at 10:13

## 2023-02-04 RX ADMIN — SUCRALFATE 1 G: 1 TABLET ORAL at 15:56

## 2023-02-04 RX ADMIN — BUDESONIDE 500 MCG: 0.5 SUSPENSION RESPIRATORY (INHALATION) at 06:22

## 2023-02-04 ASSESSMENT — PAIN SCALES - GENERAL: PAINLEVEL_OUTOF10: 0

## 2023-02-04 ASSESSMENT — PAIN SCALES - WONG BAKER: WONGBAKER_NUMERICALRESPONSE: 0

## 2023-02-04 NOTE — PROGRESS NOTES
[facility] MD Silvestre Beckford MD Cesario Sams, MD Prudy Pick, BRADLEY Devlin, NUNU Alonzo, BRADLEY  Progress Note    2/3/2023    SUBJECTIVE:   1/20: Feeling little better today. Denies new complaint. Still tachypnea, though denies dyspnea no cp/palp Appetite ok ROS otherwise unremarkable    1//21 seen in her room on o2/nc bp stable weak poor po intake   2+ edema in LE     1/22 charts reviewed . On bipap    1/23 : on o2/nc . Still look hypervolemic     1/24 seen in her room comfortable o2/nc . Still with LE edema which seems to be multifactorial and will likely need to accept having some edema on board     1/25 sitting on edge of the bed working with pt . Still with considerable edema in LEs   BP stable     1/26 seen in her room reports of SOB with minimal activity , LE edema still signficant . 1/27 good UO On 5 L/o2/nc  Still with sob with activity Tachycardiac with low functional capacity     1/28: Asleep, resting and breathing comfortably on nasal cannula. Remains edematous. Ongoing fatigue and generalized weakness. 1/29: Hypotensive last evening, Bumex drip stopped, IV normal saline bolus administered to effect. Breathing little more easily though still on AVAPS. To be downgraded to CPAP shortly. Thirsty. Still markedly edematous. 1/30: Somnolent, though arousable. On CPAP. Ongoing fatigue and malaise with generalized weakness    1/31 seen in her room , skin wrinkling in LE on HFNC . BP on lower side  For thoracentesis today   Dw RN .     2/1: Seen while laying in bed, no acute distress. Tells me that she is thirsty. She is on heated high flow nasal canula. Feels that her breathing has improved slightly. Plan is for thoracentesis later this afternoon, could not be done yesterday due to elevated INR. Still with skin wrinkling to bilateral lower extremities.      2/2 1 L removed with thoracentesis   O2 requirement better on 8 L o2/nc Very weak and deconditioned   Labile BP numbers    2/2 o2/nc LE UO ok remain weak decondtioned . Edema in LE getting somewhat worse    PROBLEM LIST:    Principal Problem:    Acute on chronic respiratory failure with hypoxia and hypercapnia (HCC)  Active Problems:    Palliative care encounter  Resolved Problems:    * No resolved hospital problems. *         DIET:    ADULT DIET; Regular; 3 carb choices (45 gm/meal); Low Fat/Low Chol/High Fiber/2 gm Na; Moderately Thick (Honey); 1800 ml  ADULT ORAL NUTRITION SUPPLEMENT; Dinner;  Other Oral Supplement; Gelatein 20     MEDS (scheduled):    [START ON 2/4/2023] acetaZOLAMIDE (DIAMOX) IVPB  500 mg IntraVENous Q12H    bumetanide  2 mg IntraVENous BID    [Held by provider] dilTIAZem  60 mg Oral 3 times per day    sucralfate  1 g Oral 4x Daily    rOPINIRole  1 mg Oral TID    atorvastatin  20 mg Oral Nightly    ipratropium-albuterol  1 vial Inhalation 4x Daily    [Held by provider] insulin glargine  13 Units SubCUTAneous BID    [Held by provider] LORazepam  0.5 mg Oral Nightly    metoprolol succinate  25 mg Oral BID    insulin lispro  0-4 Units SubCUTAneous TID WC    insulin lispro  0-4 Units SubCUTAneous Nightly    budesonide  0.5 mg Nebulization BID    sodium chloride flush  10 mL IntraVENous 2 times per day    miconazole   Topical BID    arformoterol tartrate  15 mcg Nebulization BID    pantoprazole  40 mg Oral QAM AC    apixaban  5 mg Oral BID    mirtazapine  7.5 mg Oral Nightly    [Held by provider] aspirin  81 mg Oral Daily    carbidopa-levodopa  1 tablet Oral TID    sertraline  50 mg Oral Daily    montelukast  10 mg Oral Nightly       MEDS (infusions):   sodium chloride      dextrose         MEDS (prn):  sodium chloride, loperamide, sodium chloride flush, sodium chloride, promethazine **OR** ondansetron, polyethylene glycol, acetaminophen **OR** acetaminophen, glucose, dextrose bolus **OR** dextrose bolus, glucagon (rDNA), dextrose    PHYSICAL EXAM:     Patient Vitals for the past 24 hrs:   BP Temp Temp src Pulse Resp SpO2   02/03/23 1404 -- -- -- -- -- 98 %   02/03/23 1336 85/73 97.9 °F (36.6 °C) Oral (!) 109 16 99 %   02/03/23 0816 (!) 116/97 97.5 °F (36.4 °C) Axillary (!) 105 14 98 %   02/03/23 0411 (!) 113/95 -- -- (!) 102 12 100 %   02/03/23 0114 88/61 -- -- 85 -- 100 %   02/03/23 0108 -- -- -- -- 23 --   02/02/23 2233 (!) 102/57 -- -- 95 -- --   02/02/23 2203 -- -- -- -- 20 --   02/02/23 2108 110/63 97.7 °F (36.5 °C) Oral (!) 104 18 99 %     @      Intake/Output Summary (Last 24 hours) at 2/3/2023 1923  Last data filed at 2/3/2023 1422  Gross per 24 hour   Intake 720 ml   Output 900 ml   Net -180 ml           Wt Readings from Last 3 Encounters:   02/01/23 199 lb 5 oz (90.4 kg)   01/16/23 259 lb (117.5 kg)   01/16/23 259 lb (117.5 kg)     Age-appropriate morbidly obese white woman, though easily arousable, no apparent distress  NC/AT EOMI sclera conjunctive are clear and anicteric mucous membranes are moist  Neck soft supple trachea midline  Lungs clear to ausculation bilaterally, diminished in the bases. Poor inspiratory effort. Regular rhythm normal S1 and S2  Abdomen soft nontender nondistended normal active bowel sounds. Abdominal pannus has substantial edema  Distal extremities, thighs, sacrum have substantial chronic type nonpitting edema, +1 BLE edema, though with skin wrinkling   Pulses 1+ x4  Moves all 4 extremities  Cranial nerves II through XII grossly intact  Awake, alert, interactive and appropriate  Skin tone consistent with chronic venous stasis distally      DATA:    No results for input(s): WBC, HGB, HCT, MCV, PLT in the last 72 hours.       Recent Labs     02/01/23  0916 02/02/23  0929 02/03/23  0525    145 143   K 3.2* 3.7 3.7   CL 96* 99 96*   CO2 40* 38* 40*   MG 1.6  --   --    BUN 57* 53* 49*   CREATININE 2.2* 2.2* 2.1*         Lab Results   Component Value Date    LABPROT 0.5 (H) 01/18/2023    LABPROT 0.5 01/18/2023     On CT no hydro/signs of obstruction     Urine studies  U Na 21, U Cl 23 U prot:cr ratio 0.5    ASSESSMENT / RECOMMENDATIONS:       Assessment  1. Acute kidney injury urine lytes support decrease effective volume     2. CKD stage III, Baseline creatinine 1.4 to 1.7 mg/dL, secondary to diabetic nephropathy and renal microvascular atherosclerotic disease  0.5 g proteinuria     3. Anemia due to CKD, phlebotomy associated prolonged hospitalization     4. Acute hypercapnic and hypoxic respiratory failure due to COPD exacerbation and pneumonia. Does not appear hypervolemic. 5.  Morbid obesity, BMI 50.6 kg per metered square     6. Edema/anasarca, chronic, multifactorial, due to venous insufficiency in the setting of morbid obesity, relative immobility, likely diastolic dysfunction though it was \"indeterminate\" on echo, the does have pulmonary hypertension, mild right-sided heart failure    7.   Hypotension       Creatinine stable   1 L removed with thoracentesis       Recommendations     bumex along with diamox bid   PT/OT   Nutrition support   Bmp in am     Electronically signed by Lia Gray MD on 2/3/2023

## 2023-02-04 NOTE — PLAN OF CARE
Problem: Discharge Planning  Goal: Discharge to home or other facility with appropriate resources  2/3/2023 2310 by Kirill Andrade  Outcome: Progressing  2/3/2023 1816 by Etta Baez RN  Outcome: Progressing     Problem: Respiratory - Adult  Goal: Achieves optimal ventilation and oxygenation  2/3/2023 2310 by JAE Spangler  Outcome: Progressing  2/3/2023 1816 by Etta Baez RN  Outcome: Progressing     Problem: Cardiovascular - Adult  Goal: Maintains optimal cardiac output and hemodynamic stability  2/3/2023 2310 by JAE Spangler  Outcome: Progressing  2/3/2023 1816 by Etta Baez RN  Outcome: Progressing  Goal: Absence of cardiac dysrhythmias or at baseline  2/3/2023 2310 by Kirill Andrade  Outcome: Progressing  2/3/2023 1816 by Etta Baez RN  Outcome: Progressing     Problem: Genitourinary - Adult  Goal: Absence of urinary retention  2/3/2023 2310 by Kirill Andrade  Outcome: Progressing  2/3/2023 1816 by Etta Baez RN  Outcome: Progressing  Goal: Urinary catheter remains patent  2/3/2023 2310 by Kirill Andrade  Outcome: Progressing  2/3/2023 1816 by Etta Baez RN  Outcome: Progressing     Problem: Chronic Conditions and Co-morbidities  Goal: Patient's chronic conditions and co-morbidity symptoms are monitored and maintained or improved  2/3/2023 2310 by JAE Spangler  Outcome: Progressing  2/3/2023 1816 by Etta Baez RN  Outcome: Progressing     Problem: Skin/Tissue Integrity  Goal: Absence of new skin breakdown  Description: 1. Monitor for areas of redness and/or skin breakdown  2. Assess vascular access sites hourly  3. Every 4-6 hours minimum:  Change oxygen saturation probe site  4. Every 4-6 hours:  If on nasal continuous positive airway pressure, respiratory therapy assess nares and determine need for appliance change or resting period.   2/3/2023 2310 by Kirill Andrade  Outcome: Progressing  2/3/2023 1816 by Etta Baez RN  Outcome: Progressing     Problem: Safety - Adult  Goal: Free from fall injury  2/3/2023 2310 by Snehal Parson  Outcome: Progressing  2/3/2023 1816 by Jluis Vallejo RN  Outcome: Progressing     Problem: ABCDS Injury Assessment  Goal: Absence of physical injury  2/3/2023 2310 by Snehal Parson  Outcome: Progressing  2/3/2023 1816 by Jluis Vallejo RN  Outcome: Progressing     Problem: Pain  Goal: Verbalizes/displays adequate comfort level or baseline comfort level  2/3/2023 2310 by Rebecka Harada, CASSANDRA  Outcome: Progressing  2/3/2023 1816 by Jluis Vallejo RN  Outcome: Progressing     Problem: Nutrition Deficit:  Goal: Optimize nutritional status  2/3/2023 2310 by Snehal Parson  Outcome: Progressing  2/3/2023 1816 by Jluis Vallejo RN  Outcome: Progressing

## 2023-02-04 NOTE — PROGRESS NOTES
Attempted to update all three of patient's contact people. All three calls went straight to voicemail.

## 2023-02-04 NOTE — PROGRESS NOTES
Physical Therapy  Physical Therapy Treatment Note/Plan of Care    Room #:  2849/4529-33  Patient Name: Pita Demarco  YOB: 1950  MRN: [id number]    Date of Service: 2/4/2023     Tentative placement recommendation: Subacute Rehab  Equipment recommendation:  To be determined      Evaluating Physical Therapist: Shantel Ayoub PT, DPT #[id number]      Specific Provider Orders/Date/Referring Provider :     01/19/23 0745    PT eval and treat  Start:  01/19/23 0745,   End:  01/19/23 0745,   ONE TIME,   Standing Count:  1 Occurrences,   Jhon Pitts MD Acknowledge New     Admitting Diagnosis:   NSTEMI (non-ST elevated myocardial infarction) [facility]) [I21.4]  Atrial fibrillation with rapid ventricular response (HCC) [I48.91]  Recurrent right pleural effusion [J90]  Hypotension, unspecified hypotension type [I95.9]  Acute on chronic respiratory failure with hypoxia and hypercapnia (HCC) [J96.21, J96.22]      Surgery: none  Visit Diagnoses         Codes    Recurrent right pleural effusion     J90    NSTEMI (non-ST elevated myocardial infarction) ([facility] [address]     I21.4    Postprocedural pneumothorax     J95.811            Patient Active Problem List   Diagnosis    Depression with anxiety    Osteoporosis    Asthma    Hyperlipidemia    Mitral regurgitation    Obstructive sleep apnea syndrome    Psoriasis    Diabetes mellitus ([facility] [address]    Parkinson's disease ([facility] [address]    Primary hypertension    Microalbuminuria    Morbid obesity (HCC)    RLS (restless legs syndrome)    Generalized weakness    Inability to walk    Hypothyroidism    Chest pain    Acute asthma exacerbation    Asthma exacerbation, mild    Paroxysmal atrial fibrillation (HCC)    Atrial fibrillation with rapid ventricular response (HCC)    Acute on chronic congestive heart failure ([facility] [address]    Coronary artery disease involving native coronary artery of native heart without angina pectoris    Dysphagia    Hepatosplenomegaly    Acute decompensated heart failure ([facility] [address]    Acute diastolic (congestive) heart failure (HCC)    Nonrheumatic tricuspid valve regurgitation    Tobacco abuse    Recurrent syncope    Septic shock (HCC)    Seizure-like activity (HCC)    Acute kidney injury ([facility] [address]    Pancytopenia (HCC)    Thrombocytopenia (HCC)    Encephalopathy    Acute respiratory failure with hypoxia (HCC)    Lactic acidosis    Delirium    Acute on chronic anemia    Pleural effusion, right    Acute respiratory failure with hypoxia and hypercapnia (HCC)    Acute confusion    Acute on chronic diastolic heart failure (HCC)    Macrocytosis    Other specified anemias    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[facility] [address]    Puerperal sepsis with acute hypoxic respiratory failure without septic shock (HCC)    Pulmonary HTN (HCC)    Chronic anticoagulation    RVF (right ventricular failure) (HCC)    Metabolic alkalosis    Sepsis (HCC)    COPD exacerbation (HCC)    Acute on chronic respiratory failure with hypercapnia (HCC)    Persistent atrial fibrillation (HCC)    Hypercapnic respiratory failure (HCC)    Acute on chronic respiratory failure (HCC)    Hyperkalemia    Heart failure (HCC)    Acute renal insufficiency    Hypotension    Anemia    Acute on chronic respiratory failure with hypoxia and hypercapnia (HCC)    Palliative care encounter        ASSESSMENT of Current Deficits Patient exhibits decreased strength, balance, and endurance impairing functional mobility, transfers, gait , gait distance, and tolerance to activity. Patient able to perform all UE exercises with AROM; all LE exercises were AAROM/AROM with rest periods as needed. Pt did not want to get out of bed due to being to tired.       PHYSICAL THERAPY  PLAN OF CARE       Physical therapy plan of care is established based on physician order,  patient diagnosis and clinical assessment    Current Treatment Recommendations:    -Bed Mobility: Lower extremity exercises  and Trunk control activities   -Sitting Balance: Incorporate reaching activities to activate trunk muscles , Hands on support to maintain midline , Facilitate active trunk muscle engagement , Facilitate postural control in all planes , and Engage in core activities to allow for movement within base of support   -Standing Balance: Perform strengthening exercises in standing to promote motor control with or without upper extremity support , Instruct patient on adequate base of support to maintain balance, and Challenge balance utilizing reaching  activities beyond center of gravity    -Transfers: Provide instruction on proper hand and foot position for adequate transfer of weight onto lower extremities and use of gait device if needed, Cues for hand placement, technique and safety. Provide stabilization to prevent fall , Facilitate weight shift forward on to lower extremities and provide necessary stabilization of bilateral lower extremities , Support transfer of weight on to lower extremities, and Assist with extension of knees trunk and hip to accept weight transfer   -Gait: Gait training, Standing activities to improve: base of support, weight shift, weight bearing , Exercises to improve trunk control, Exercises to improve hip and knee control, Performance of protected weight bearing activities, and Activities to increase weight bearing   -Endurance: Utilize Supervised activities to increase level of endurance to allow for safe functional mobility including transfers and gait  and Use graduated activities to promote good breathing techniques and provide support and education to maximize respiratory function    PT long term treatment goals are located in below grid    Patient and or family understand(s) diagnosis, prognosis, and plan of care. Frequency of treatments: Patient will be seen  3-5 times/week.          Prior Level of Function: Patient ambulated with wheeled walker for short distances  Rehab Potential: fair + for baseline    Past medical history:   Past Medical History: Diagnosis Date    A-fib [facility])     Acute on chronic congestive heart failure (HCC)     Anxiety     Asthma     CAD (coronary artery disease) 01/21/2016    Cancer ([facility] [address]  breast ca 2006    right lumpectomy    Chronic kidney disease     nephrolithiasis    COPD exacerbation ([facility] [address] 10/12/2022    Depression     Diabetes mellitus ([facility] [address]     H/O mammogram     Hx MRSA infection     toe infection january 2012    Hyperlipidemia     Hypertension     Lateral epicondylitis     Morbid obesity (HCC)     SERENA on CPAP     Oxygen dependent     Parkinson's disease ([facility] [address]     Tubal ligation status      Past Surgical History:   Procedure Laterality Date    BREAST LUMPECTOMY      BREAST REDUCTION SURGERY      CARDIAC CATHETERIZATION  4/28/2014    Dr. Ian Yeung  01/11/2022    Dr. Cedrick Cortes  7/29/15    CT GUIDED CHEST TUBE  7/8/2022    CT GUIDED CHEST TUBE 7/8/2022 Phil Rivera MD [facility]    ENDOSCOPY, COLON, DIAGNOSTIC  7/19/15    GALLBLADDER SURGERY      LUMBAR LAMINECTOMY      TOE AMPUTATION      TONSILLECTOMY      UPPER GASTROINTESTINAL ENDOSCOPY      UPPER GASTROINTESTINAL ENDOSCOPY N/A 7/19/2022    EGD ESOPHAGOGASTRODUODENOSCOPY performed by Disha Campbell MD at [address]:    Precautions:  Up with assistance, falls, O2, and morbid obesity      Social history: Patient from SNF    Equipment owned: Wheelchair, Wheeled Walker, and [id number] - Inpatient   How much help is needed turning from your back to your side while in a flat bed without using bedrails?: A Lot  How much help is needed moving from lying on your back to sitting on the side of a flat bed without using bedrails?: A Lot  How much help is needed moving to and from a bed to a chair?: Total  How much help is needed standing up from a chair using your arms?: A Lot  How much help is needed walking in hospital room?: Total  How much help is needed climbing 3-5 steps with a railing?: Total  AM-PAC Inpatient Mobility Raw Score : 9  AM-[facility] Inpatient T-Scale Score : 30.55  Mobility Inpatient CMS 0-100% Score: 81.38  Mobility Inpatient CMS G-Code Modifier : CM    Nursing cleared patient for PT treatment. Patient c/o being tired. OBJECTIVE;   Initial Evaluation  Date: 1/19/2023 Treatment Date:  2/4/2023       Short Term/ Long Term   Goals   Was pt agreeable to Eval/treatment? Yes yes To be met in 3 days   Pain level   0/10   0/10    Bed Mobility    Rolling: Maximal assist of 1    Supine to sit: Maximal assist of 1    Sit to supine: Maximal assist of 1    Scooting: Maximal assist of 1   Rolling: Not assessed    Supine to sit: Not assessed    Sit to supine: Not assessed    Scooting: Not assessed     Rolling: Minimal assist of 1    Supine to sit: Minimal assist of 1    Sit to supine: Minimal assist of 1    Scooting: Minimal assist of 1     Transfers Sit to stand: Not assessed  d/t fair- seated balance and pt declining standing Sit to stand: Not assessed       Sit to stand:  Moderate assist of 1    Ambulation    not assessed  not assessed    > 15 feet using  wheeled walker with Moderate assist of 1    Stair negotiation: ascended and descended   Not assessed           ROM Within functional limits    Increase range of motion 10% of affected joints    Strength BUE:  refer to OT eval  RLE:  4-/5  LLE:  4-/5  Increase strength in affected mm groups by 1/3 grade   Balance Sitting EOB:  fair -  Dynamic Standing:  not assessed  Sitting EOB: not assessed   Dynamic Standing: not assessed    Sitting EOB:  fair   Dynamic Standing: fair - with [facility]     Patient is Alert & Oriented x person, place, time, and situation and follows directions    Sensation:  Patient  denies numbness/tingling   Edema:  no   Endurance: poor+    Vitals:  6 liters nasal cannula   Blood Pressure at rest  Blood Pressure during session    Heart Rate at rest  Heart Rate during session    SPO2 at rest 100%  SPO2 during session %     Patient education  Patient educated on role of Physical Therapy, risks of immobility, safety and plan of care, energy conservation,  importance of mobility while in hospital , ankle pumps, quad set and glut set for edema control, blood clot prevention, safety , and positioning for skin integrity and comfort     Patient response to education:   Pt verbalized understanding Pt demonstrated skill Pt requires further education in this area   Yes Partial Yes      Treatment:  Patient practiced and was instructed/facilitated in the following treatment: Patient performed supine BLE & BUE exercises. Therapeutic Exercises:  ankle pumps, quad sets, heel slide, hip abduction/adduction, SAQ, shoulder elevation, elbow flexion/extension, wrist flexion/extension, and finger flexion/extension 15 - 20 reps      At end of session, patient in bed with     call light and phone within reach,  all lines and tubes intact, nursing notified. Patient would benefit from continued skilled Physical Therapy to improve functional independence and quality of life. Patient's/ family goals   rehab    Time in  08:25  Time out  08:48    Total Treatment Time  23 minutes      CPT codes:  Therapeutic exercises (37132)   23 minutes  2 unit(s)    Tasia Manjarrez  [facility]  LIC # [id number]

## 2023-02-04 NOTE — PROGRESS NOTES
[facility]  MD Lalo Cervantes, MD Mariangel Garica, CNP   Joyce Devlin, NUNU Justin, BRADLEY  Progress Note    2/4/2023    SUBJECTIVE:   1/20: Feeling little better today.  Denies new complaint.  Still tachypnea, though denies dyspnea no cp/palp Appetite ok ROS otherwise unremarkable    1//21 seen in her room on o2/nc bp stable weak poor po intake   2+ edema in LE     1/22 charts reviewed .   On bipap    1/23 : on o2/nc . Still look hypervolemic     1/24 seen in her room comfortable o2/nc . Still with LE edema which seems to be multifactorial and will likely need to accept having some edema on board     1/25 sitting on edge of the bed working with pt .   Still with considerable edema in LEs   BP stable     1/26 seen in her room reports of SOB with minimal activity , LE edema still signficant .     1/27 good UO On 5 L/o2/nc  Still with sob with activity Tachycardiac with low functional capacity     1/28: Asleep, resting and breathing comfortably on nasal cannula.  Remains edematous.  Ongoing fatigue and generalized weakness.    1/29: Hypotensive last evening, Bumex drip stopped, IV normal saline bolus administered to effect.  Breathing little more easily though still on AVAPS.  To be downgraded to CPAP shortly.  Thirsty.  Still markedly edematous.    1/30: Somnolent, though arousable.  On CPAP.  Ongoing fatigue and malaise with generalized weakness    1/31 seen in her room , skin wrinkling in LE on HFNC . BP on lower side  For thoracentesis today   Dw RN .     2/1: Seen while laying in bed, no acute distress. Tells me that she is thirsty. She is on heated high flow nasal canula. Feels that her breathing has improved slightly. Plan is for thoracentesis later this afternoon, could not be done yesterday due to elevated INR. Still with skin wrinkling to bilateral lower extremities.     2/2 1 L removed with thoracentesis   O2 requirement better on 8 L o2/nc  Very weak and deconditioned   Labile BP numbers    2/2 o2/nc LE UO ok remain weak decondtioned . Edema in LE getting somewhat worse    2/4 remain with significant depdent edema deconditioned in bed     PROBLEM LIST:    Principal Problem:    Acute on chronic respiratory failure with hypoxia and hypercapnia (HCC)  Active Problems:    Palliative care encounter  Resolved Problems:    * No resolved hospital problems. *         DIET:    ADULT ORAL NUTRITION SUPPLEMENT; Dinner; Other Oral Supplement; Gelatein 20  ADULT DIET; Regular; 3 carb choices (45 gm/meal); Low Fat/Low Chol/High Fiber/2 gm Na;  Moderately Thick (Honey); 1200 ml     MEDS (scheduled):    acetaZOLAMIDE (DIAMOX) IVPB  500 mg IntraVENous Q12H    bumetanide  2 mg IntraVENous BID    [Held by provider] dilTIAZem  60 mg Oral 3 times per day    sucralfate  1 g Oral 4x Daily    rOPINIRole  1 mg Oral TID    atorvastatin  20 mg Oral Nightly    ipratropium-albuterol  1 vial Inhalation 4x Daily    [Held by provider] insulin glargine  13 Units SubCUTAneous BID    [Held by provider] LORazepam  0.5 mg Oral Nightly    metoprolol succinate  25 mg Oral BID    insulin lispro  0-4 Units SubCUTAneous TID WC    insulin lispro  0-4 Units SubCUTAneous Nightly    budesonide  0.5 mg Nebulization BID    sodium chloride flush  10 mL IntraVENous 2 times per day    miconazole   Topical BID    arformoterol tartrate  15 mcg Nebulization BID    pantoprazole  40 mg Oral QAM AC    apixaban  5 mg Oral BID    mirtazapine  7.5 mg Oral Nightly    [Held by provider] aspirin  81 mg Oral Daily    carbidopa-levodopa  1 tablet Oral TID    sertraline  50 mg Oral Daily    montelukast  10 mg Oral Nightly       MEDS (infusions):   sodium chloride 25 mL/hr at 02/04/23 0222    dextrose         MEDS (prn):  sodium chloride, loperamide, sodium chloride flush, sodium chloride, promethazine **OR** ondansetron, polyethylene glycol, acetaminophen **OR** acetaminophen, glucose, dextrose bolus **OR** dextrose bolus, glucagon (rDNA), dextrose    PHYSICAL EXAM:     Patient Vitals for the past 24 hrs:   BP Temp Temp src Pulse Resp SpO2   02/04/23 0817 (!) 100/56 -- -- (!) 104 20 100 %   02/04/23 0349 (!) 94/54 98 °F (36.7 °C) Axillary (!) 102 20 100 %   02/04/23 0152 (!) 106/56 -- -- -- -- --   02/03/23 2348 116/61 97.6 °F (36.4 °C) Axillary (!) 113 20 99 %   02/03/23 2206 -- -- -- (!) 108 -- --   02/03/23 2114 -- -- -- -- 23 --   02/03/23 1942 101/62 97.9 °F (36.6 °C) Oral (!) 115 16 99 %   02/03/23 1404 -- -- -- -- -- 98 %   02/03/23 1336 85/73 97.9 °F (36.6 °C) Oral (!) 109 16 99 %     @      Intake/Output Summary (Last 24 hours) at 2/4/2023 1309  Last data filed at 2/4/2023 1029  Gross per 24 hour   Intake 718 ml   Output 800 ml   Net -82 ml           Wt Readings from Last 3 Encounters:   02/01/23 199 lb 5 oz (90.4 kg)   01/16/23 259 lb (117.5 kg)   01/16/23 259 lb (117.5 kg)     Age-appropriate morbidly obese white woman, though easily arousable, no apparent distress  NC/AT EOMI sclera conjunctive are clear and anicteric mucous membranes are moist  Neck soft supple trachea midline  Lungs clear to ausculation bilaterally, diminished in the bases. Poor inspiratory effort. Regular rhythm normal S1 and S2  Abdomen soft nontender nondistended normal active bowel sounds. Abdominal pannus has substantial edema  Distal extremities, thighs, sacrum have substantial chronic type nonpitting edema, +1 BLE edema, though with skin wrinkling   Pulses 1+ x4  Moves all 4 extremities  Cranial nerves II through XII grossly intact  Awake, alert, interactive and appropriate  Skin tone consistent with chronic venous stasis distally      DATA:    No results for input(s): WBC, HGB, HCT, MCV, PLT in the last 72 hours.       Recent Labs     02/02/23  0929 02/03/23  0525 02/04/23  0726    143 142   K 3.7 3.7 3.4*   CL 99 96* 95*   CO2 38* 40* 41*   BUN 53* 49* 49*   CREATININE 2.2* 2.1* 2.3*         Lab Results   Component Value Date LABPROT 0.5 (H) 01/18/2023    LABPROT 0.5 01/18/2023     On CT no hydro/signs of obstruction     Urine studies  U Na 21, U Cl 23 U prot:cr ratio 0.5    ASSESSMENT / RECOMMENDATIONS:       Assessment  1. Acute kidney injury urine lytes support decrease effective volume     2. CKD stage III, Baseline creatinine 1.4 to 1.7 mg/dL, secondary to diabetic nephropathy and renal microvascular atherosclerotic disease  0.5 g proteinuria     3. Anemia due to CKD, phlebotomy associated prolonged hospitalization     4. Acute hypercapnic and hypoxic respiratory failure due to COPD exacerbation and pneumonia. Does not appear hypervolemic. 5.  Morbid obesity, BMI 50.6 kg per metered square     6. Edema/anasarca, chronic, multifactorial, due to venous insufficiency in the setting of morbid obesity, relative immobility, likely diastolic dysfunction though it was \"indeterminate\" on echo, the does have pulmonary hypertension, mild right-sided heart failure    7.   Hypotension       Creatinine stable   1 L removed with thoracentesis       Recommendations    With HR in 120s will hold bumex   Continue diamox   Replace k   PT/OT   Nutrition support   We might to consider RRT/HD to unload her massive hypervolemia   Overall prognosis poor with her being so deconditioned and weak     Electronically signed by Rohan Diaz MD on 2/4/2023

## 2023-02-04 NOTE — PROGRESS NOTES
Department of Internal Medicine  General Internal Medicine  Attending Progress Note  Chief Complaint   Patient presents with    Respiratory Distress     Normally on 3L NC, came in on CPAP, given total of 50mcg push dose epi for low BP by EMS       I don't see any further incremental improvement. Patient agrees that there has been no change since yesterday. she is somewhat depressed and lonely. None of her 7 children have been visiting her but she gives permission for nurse to update 3 of them.   I explained how we will likely update one of them and instruct that person to update their other 6 the nurses aware of this and will encourage further supportive information and encouraged family visits    OBJECTIVE      Medications    Current Facility-Administered Medications: acetaZOLAMIDE (DIAMOX) 500 mg in sodium chloride 0.9 % 100 mL IVPB, 500 mg, IntraVENous, Q12H  bumetanide (BUMEX) injection 2 mg, 2 mg, IntraVENous, BID  sodium chloride (OCEAN, BABY AYR) 0.65 % nasal spray 1 spray, 1 spray, Each Nostril, PRN  [Held by provider] dilTIAZem (CARDIZEM) tablet 60 mg, 60 mg, Oral, 3 times per day  sucralfate (CARAFATE) tablet 1 g, 1 g, Oral, 4x Daily  rOPINIRole (REQUIP) tablet 1 mg, 1 mg, Oral, TID  atorvastatin (LIPITOR) tablet 20 mg, 20 mg, Oral, Nightly  ipratropium-albuterol (DUONEB) nebulizer solution 3 mL, 1 vial, Inhalation, 4x Daily  [Held by provider] insulin glargine (LANTUS) injection vial 13 Units, 13 Units, SubCUTAneous, BID  loperamide (IMODIUM) capsule 2 mg, 2 mg, Oral, 4x Daily PRN  [Held by provider] LORazepam (ATIVAN) tablet 0.5 mg, 0.5 mg, Oral, Nightly  metoprolol succinate (TOPROL XL) extended release tablet 25 mg, 25 mg, Oral, BID  insulin lispro (HUMALOG) injection vial 0-4 Units, 0-4 Units, SubCUTAneous, TID WC  insulin lispro (HUMALOG) injection vial 0-4 Units, 0-4 Units, SubCUTAneous, Nightly  budesonide (PULMICORT) nebulizer suspension 500 mcg, 0.5 mg, Nebulization, BID  sodium chloride flush 0.9 % injection 10 mL, 10 mL, IntraVENous, 2 times per day  sodium chloride flush 0.9 % injection 10 mL, 10 mL, IntraVENous, PRN  0.9 % sodium chloride infusion, , IntraVENous, PRN  promethazine (PHENERGAN) tablet 12.5 mg, 12.5 mg, Oral, Q6H PRN **OR** ondansetron (ZOFRAN) injection 4 mg, 4 mg, IntraVENous, Q6H PRN  polyethylene glycol (GLYCOLAX) packet 17 g, 17 g, Oral, Daily PRN  acetaminophen (TYLENOL) tablet 650 mg, 650 mg, Oral, Q6H PRN **OR** acetaminophen (TYLENOL) suppository 650 mg, 650 mg, Rectal, Q6H PRN  miconazole (MICOTIN) 2 % powder, , Topical, BID  arformoterol tartrate (BROVANA) nebulizer solution 15 mcg, 15 mcg, Nebulization, BID  pantoprazole (PROTONIX) tablet 40 mg, 40 mg, Oral, QAM AC  glucose chewable tablet 16 g, 4 tablet, Oral, PRN  dextrose bolus 10% 125 mL, 125 mL, IntraVENous, PRN **OR** dextrose bolus 10% 250 mL, 250 mL, IntraVENous, PRN  glucagon (rDNA) injection 1 mg, 1 mg, SubCUTAneous, PRN  dextrose 10 % infusion, , IntraVENous, Continuous PRN  apixaban (ELIQUIS) tablet 5 mg, 5 mg, Oral, BID  mirtazapine (REMERON) tablet 7.5 mg, 7.5 mg, Oral, Nightly  [Held by provider] aspirin EC tablet 81 mg, 81 mg, Oral, Daily  carbidopa-levodopa (SINEMET CR)  MG per extended release tablet 1 tablet, 1 tablet, Oral, TID  sertraline (ZOLOFT) tablet 50 mg, 50 mg, Oral, Daily  montelukast (SINGULAIR) tablet 10 mg, 10 mg, Oral, Nightly  Physical    VITALS:  BP (!) 99/54   Pulse (!) 109   Temp 97.4 °F (36.3 °C) (Axillary)   Resp 20   Ht 5' (1.524 m)   Wt 199 lb 5 oz (90.4 kg)   SpO2 100%   BMI 38.93 kg/m²   No acute distress moist membranes   Normocephalic, without obvious abnormality, atraumatic, external ears without lesions,   Neck supple no cervical lymphadenopathy  Heart has a regular rate and rhythm no murmur  Lungs are clear to auscultation bilaterally with equal movements. Abdomen is soft nontender nondistended no rebound or guarding.   No significant peripheral   Anasarca  good peripheral perfusion. No significant rashes or new skin lesions. No new focality on neuro exam which is overall grossly intact. Affect and mood seem to be appropriate     Data    CBC:   Lab Results   Component Value Date/Time    WBC 7.2 01/30/2023 05:00 AM    RBC 2.98 01/30/2023 05:00 AM    HGB 7.7 01/30/2023 05:00 AM    HCT 26.6 01/30/2023 05:00 AM    MCV 89.3 01/30/2023 05:00 AM    MCH 25.8 01/30/2023 05:00 AM    MCHC 28.9 01/30/2023 05:00 AM    RDW 16.2 01/30/2023 05:00 AM    PLT 75 01/30/2023 05:00 AM    MPV 11.6 01/30/2023 05:00 AM     BMP:    Lab Results   Component Value Date/Time     02/04/2023 07:26 AM    K 3.4 02/04/2023 07:26 AM    K 3.2 02/01/2023 09:16 AM    CL 95 02/04/2023 07:26 AM    CO2 41 02/04/2023 07:26 AM    BUN 49 02/04/2023 07:26 AM    LABALBU 3.0 01/30/2023 05:00 AM    CREATININE 2.3 02/04/2023 07:26 AM    CALCIUM 8.3 02/04/2023 07:26 AM    GFRAA >60 10/16/2022 09:20 AM    LABGLOM 22 02/04/2023 07:26 AM    GLUCOSE 125 02/04/2023 07:26 AM       ASSESSMENT AND PLAN    Summary of stay: This is a 67 y.o. female  has a past medical history of A-fib ([facility] [address] Acute on chronic congestive heart failure ([facility] [address] Anxiety, Asthma, CAD (coronary artery disease), Cancer ([facility] [address] Chronic kidney disease, COPD exacerbation ([facility] [address] Depression, Diabetes mellitus ([facility] [address] H/O mammogram, Hx MRSA infection, Hyperlipidemia, Hypertension, Lateral epicondylitis, Morbid obesity ([address] SERENA on CPAP, Oxygen dependent, Parkinson's disease ([facility] [address] and Tubal ligation status. presented with SOB, hypotension  for last few days prior to arrival to the hospital. SOB is associated with some cough, nonproductive but no fever or chills reported. Initially SOB was mild, intermittent but gradually getting more persistent. Started gradually. Exacerbated by general activity or exertion. Relieved only partially by rest. In ED patient was found to be hypotensive with Afib RVR and respiratory failure and was placed on BIPAP.  BP improved after midodrine dose. The patient was seen and examined at bedside, appears alert and awake with no acute distress and is able to answer simple  questions. On direct questioning, patient denied any  resting ongoing chest pain, hemoptysis, productive cough, fever, ongoing palpitation, active abdominal pain, hematemesis, rectal bleeding, blessing, hematuria, any other  and GI complaints, and any new focal neuro deficits     Acute on chronic respiratory failure with hypoxia and hypercapnia & Obesity hypoventilation syndrome: s/p Bumex Drip per Nephro. Per pulm. High flow o2 8 L unchanged  Congestive heart failure: decompensated. Anasarca. Diuresis per nephro. Thoracentesis done by IR on 2/1: 1000cc of clear yellow color pleural fluid drained  Atrial Fibrillation: restart Eliquis 2/2. Was on hold for thorocoentesis   UTI: vre on cx s/p zyvox stopped by ID  Acute on chronic Kidney disease:  Renal function is stable with crt of 2.1 on 2/3  Dm type 2: Fighting scale insulin  Parkinson disease:  sinemet cr. Pulm Htn: Pulm following  S/p hypokalemia   DVT prophylaxis: Eliquis   full code. Palliative care confirmed full code.    Disposition: community skilled if she is not excepted by [facility]

## 2023-02-05 ENCOUNTER — APPOINTMENT (OUTPATIENT)
Dept: GENERAL RADIOLOGY | Age: 73
End: 2023-02-05
Payer: MEDICARE

## 2023-02-05 LAB
ALBUMIN SERPL-MCNC: 3 G/DL (ref 3.5–5.2)
ALP BLD-CCNC: 61 U/L (ref 35–104)
ALT SERPL-CCNC: <5 U/L (ref 0–32)
ANION GAP SERPL CALCULATED.3IONS-SCNC: 9 MMOL/L (ref 7–16)
AST SERPL-CCNC: 8 U/L (ref 0–31)
BILIRUB SERPL-MCNC: 0.3 MG/DL (ref 0–1.2)
BUN BLDV-MCNC: 46 MG/DL (ref 6–23)
CALCIUM SERPL-MCNC: 8.1 MG/DL (ref 8.6–10.2)
CHLORIDE BLD-SCNC: 94 MMOL/L (ref 98–107)
CO2: 37 MMOL/L (ref 22–29)
CREAT SERPL-MCNC: 2.3 MG/DL (ref 0.5–1)
EKG ATRIAL RATE: 163 BPM
EKG Q-T INTERVAL: 418 MS
EKG QRS DURATION: 72 MS
EKG QTC CALCULATION (BAZETT): 516 MS
EKG R AXIS: 108 DEGREES
EKG T AXIS: -163 DEGREES
EKG VENTRICULAR RATE: 92 BPM
GFR SERPL CREATININE-BSD FRML MDRD: 22 ML/MIN/1.73
GLUCOSE BLD-MCNC: 125 MG/DL (ref 74–99)
METER GLUCOSE: 132 MG/DL (ref 74–99)
METER GLUCOSE: 143 MG/DL (ref 74–99)
METER GLUCOSE: 161 MG/DL (ref 74–99)
METER GLUCOSE: 175 MG/DL (ref 74–99)
POTASSIUM REFLEX MAGNESIUM: 4.3 MMOL/L (ref 3.5–5)
SODIUM BLD-SCNC: 140 MMOL/L (ref 132–146)
TOTAL PROTEIN: 5.2 G/DL (ref 6.4–8.3)

## 2023-02-05 PROCEDURE — 99223 1ST HOSP IP/OBS HIGH 75: CPT | Performed by: INTERNAL MEDICINE

## 2023-02-05 PROCEDURE — 94640 AIRWAY INHALATION TREATMENT: CPT

## 2023-02-05 PROCEDURE — 93005 ELECTROCARDIOGRAM TRACING: CPT | Performed by: INTERNAL MEDICINE

## 2023-02-05 PROCEDURE — 82962 GLUCOSE BLOOD TEST: CPT

## 2023-02-05 PROCEDURE — 71045 X-RAY EXAM CHEST 1 VIEW: CPT

## 2023-02-05 PROCEDURE — 36415 COLL VENOUS BLD VENIPUNCTURE: CPT

## 2023-02-05 PROCEDURE — 6360000002 HC RX W HCPCS: Performed by: FAMILY MEDICINE

## 2023-02-05 PROCEDURE — 80053 COMPREHEN METABOLIC PANEL: CPT

## 2023-02-05 PROCEDURE — 94660 CPAP INITIATION&MGMT: CPT

## 2023-02-05 PROCEDURE — 6370000000 HC RX 637 (ALT 250 FOR IP): Performed by: INTERNAL MEDICINE

## 2023-02-05 PROCEDURE — 97530 THERAPEUTIC ACTIVITIES: CPT

## 2023-02-05 PROCEDURE — 2580000003 HC RX 258: Performed by: INTERNAL MEDICINE

## 2023-02-05 PROCEDURE — 93010 ELECTROCARDIOGRAM REPORT: CPT | Performed by: INTERNAL MEDICINE

## 2023-02-05 PROCEDURE — 2060000000 HC ICU INTERMEDIATE R&B

## 2023-02-05 PROCEDURE — 97110 THERAPEUTIC EXERCISES: CPT

## 2023-02-05 PROCEDURE — 6360000002 HC RX W HCPCS: Performed by: INTERNAL MEDICINE

## 2023-02-05 PROCEDURE — 6360000002 HC RX W HCPCS: Performed by: NURSE PRACTITIONER

## 2023-02-05 PROCEDURE — 99232 SBSQ HOSP IP/OBS MODERATE 35: CPT | Performed by: INTERNAL MEDICINE

## 2023-02-05 PROCEDURE — 2700000000 HC OXYGEN THERAPY PER DAY

## 2023-02-05 PROCEDURE — 6370000000 HC RX 637 (ALT 250 FOR IP): Performed by: NURSE PRACTITIONER

## 2023-02-05 RX ORDER — DIGOXIN 0.25 MG/ML
INJECTION INTRAMUSCULAR; INTRAVENOUS
Status: COMPLETED
Start: 2023-02-05 | End: 2023-02-05

## 2023-02-05 RX ORDER — DIGOXIN 0.25 MG/ML
125 INJECTION INTRAMUSCULAR; INTRAVENOUS DAILY
Status: DISCONTINUED | OUTPATIENT
Start: 2023-02-05 | End: 2023-02-24 | Stop reason: HOSPADM

## 2023-02-05 RX ORDER — METOPROLOL SUCCINATE 50 MG/1
50 TABLET, EXTENDED RELEASE ORAL 2 TIMES DAILY
Status: DISCONTINUED | OUTPATIENT
Start: 2023-02-05 | End: 2023-02-11

## 2023-02-05 RX ADMIN — MIRTAZAPINE 7.5 MG: 15 TABLET, FILM COATED ORAL at 22:35

## 2023-02-05 RX ADMIN — SERTRALINE 50 MG: 50 TABLET, FILM COATED ORAL at 08:30

## 2023-02-05 RX ADMIN — ARFORMOTEROL TARTRATE 15 MCG: 15 SOLUTION RESPIRATORY (INHALATION) at 18:33

## 2023-02-05 RX ADMIN — ROPINIROLE HYDROCHLORIDE 1 MG: 1 TABLET, FILM COATED ORAL at 14:42

## 2023-02-05 RX ADMIN — ACETAZOLAMIDE SODIUM 500 MG: 500 INJECTION, POWDER, LYOPHILIZED, FOR SOLUTION INTRAVENOUS at 14:34

## 2023-02-05 RX ADMIN — IPRATROPIUM BROMIDE AND ALBUTEROL SULFATE 3 ML: .5; 2.5 SOLUTION RESPIRATORY (INHALATION) at 06:19

## 2023-02-05 RX ADMIN — ATORVASTATIN CALCIUM 20 MG: 20 TABLET, FILM COATED ORAL at 22:34

## 2023-02-05 RX ADMIN — Medication 10 ML: at 08:30

## 2023-02-05 RX ADMIN — IPRATROPIUM BROMIDE AND ALBUTEROL SULFATE 3 ML: .5; 2.5 SOLUTION RESPIRATORY (INHALATION) at 09:26

## 2023-02-05 RX ADMIN — APIXABAN 5 MG: 5 TABLET, FILM COATED ORAL at 22:35

## 2023-02-05 RX ADMIN — CARBIDOPA AND LEVODOPA 1 TABLET: 50; 200 TABLET, EXTENDED RELEASE ORAL at 16:00

## 2023-02-05 RX ADMIN — MONTELUKAST SODIUM 10 MG: 10 TABLET, FILM COATED ORAL at 22:35

## 2023-02-05 RX ADMIN — BUDESONIDE 500 MCG: 0.5 SUSPENSION RESPIRATORY (INHALATION) at 06:20

## 2023-02-05 RX ADMIN — SUCRALFATE 1 G: 1 TABLET ORAL at 17:25

## 2023-02-05 RX ADMIN — CARBIDOPA AND LEVODOPA 1 TABLET: 50; 200 TABLET, EXTENDED RELEASE ORAL at 08:41

## 2023-02-05 RX ADMIN — DIGOXIN 125 MCG: 0.25 INJECTION INTRAMUSCULAR; INTRAVENOUS at 08:33

## 2023-02-05 RX ADMIN — ANTI-FUNGAL POWDER MICONAZOLE NITRATE TALC FREE: 1.42 POWDER TOPICAL at 08:29

## 2023-02-05 RX ADMIN — ROPINIROLE HYDROCHLORIDE 1 MG: 1 TABLET, FILM COATED ORAL at 08:29

## 2023-02-05 RX ADMIN — METOPROLOL SUCCINATE 25 MG: 25 TABLET, FILM COATED, EXTENDED RELEASE ORAL at 08:28

## 2023-02-05 RX ADMIN — ARFORMOTEROL TARTRATE 15 MCG: 15 SOLUTION RESPIRATORY (INHALATION) at 06:19

## 2023-02-05 RX ADMIN — SUCRALFATE 1 G: 1 TABLET ORAL at 14:10

## 2023-02-05 RX ADMIN — DIGOXIN 125 MCG: 0.25 INJECTION INTRAMUSCULAR; INTRAVENOUS at 00:29

## 2023-02-05 RX ADMIN — IPRATROPIUM BROMIDE AND ALBUTEROL SULFATE 3 ML: .5; 2.5 SOLUTION RESPIRATORY (INHALATION) at 13:25

## 2023-02-05 RX ADMIN — BUDESONIDE 500 MCG: 0.5 SUSPENSION RESPIRATORY (INHALATION) at 18:33

## 2023-02-05 RX ADMIN — ROPINIROLE HYDROCHLORIDE 1 MG: 1 TABLET, FILM COATED ORAL at 22:34

## 2023-02-05 RX ADMIN — PANTOPRAZOLE SODIUM 40 MG: 40 TABLET, DELAYED RELEASE ORAL at 06:19

## 2023-02-05 RX ADMIN — ACETAZOLAMIDE SODIUM 500 MG: 500 INJECTION, POWDER, LYOPHILIZED, FOR SOLUTION INTRAVENOUS at 02:51

## 2023-02-05 RX ADMIN — CARBIDOPA AND LEVODOPA 1 TABLET: 50; 200 TABLET, EXTENDED RELEASE ORAL at 22:35

## 2023-02-05 RX ADMIN — IPRATROPIUM BROMIDE AND ALBUTEROL SULFATE 3 ML: .5; 2.5 SOLUTION RESPIRATORY (INHALATION) at 18:33

## 2023-02-05 RX ADMIN — SUCRALFATE 1 G: 1 TABLET ORAL at 08:40

## 2023-02-05 RX ADMIN — SUCRALFATE 1 G: 1 TABLET ORAL at 22:35

## 2023-02-05 RX ADMIN — APIXABAN 5 MG: 5 TABLET, FILM COATED ORAL at 08:27

## 2023-02-05 RX ADMIN — ANTI-FUNGAL POWDER MICONAZOLE NITRATE TALC FREE: 1.42 POWDER TOPICAL at 22:35

## 2023-02-05 ASSESSMENT — PAIN SCALES - GENERAL
PAINLEVEL_OUTOF10: 0

## 2023-02-05 ASSESSMENT — PAIN SCALES - WONG BAKER: WONGBAKER_NUMERICALRESPONSE: 0

## 2023-02-05 NOTE — PROGRESS NOTES
Department of Internal Medicine  General Internal Medicine  Attending Progress Note  Chief Complaint   Patient presents with    Respiratory Distress     Normally on 3L NC, came in on CPAP, given total of 50mcg push dose epi for low BP by EMS     Patient seems happier and describes having more strength today. After I saw her yesterday I discussed with the nursing staff who put at work to the family about her lack of improvement as well as her isolation and sadness. This resulted in 9 people visiting her yesterday on 2/4  I briefly spoke to one of the daughters in brief passing on my way to an urgent meeting yesterday who referred to some family issues that I was later informed about . I confirmed that the family was updated and that there was no further discussion or answers to any questions that were needed.       OBJECTIVE      Medications    Current Facility-Administered Medications: digoxin (LANOXIN) injection 125 mcg, 125 mcg, IntraVENous, Daily  acetaZOLAMIDE (DIAMOX) 500 mg in sodium chloride 0.9 % 100 mL IVPB, 500 mg, IntraVENous, Q12H  [Held by provider] bumetanide (BUMEX) injection 2 mg, 2 mg, IntraVENous, BID  sodium chloride (OCEAN, BABY AYR) 0.65 % nasal spray 1 spray, 1 spray, Each Nostril, PRN  [Held by provider] dilTIAZem (CARDIZEM) tablet 60 mg, 60 mg, Oral, 3 times per day  sucralfate (CARAFATE) tablet 1 g, 1 g, Oral, 4x Daily  rOPINIRole (REQUIP) tablet 1 mg, 1 mg, Oral, TID  atorvastatin (LIPITOR) tablet 20 mg, 20 mg, Oral, Nightly  ipratropium-albuterol (DUONEB) nebulizer solution 3 mL, 1 vial, Inhalation, 4x Daily  [Held by provider] insulin glargine (LANTUS) injection vial 13 Units, 13 Units, SubCUTAneous, BID  loperamide (IMODIUM) capsule 2 mg, 2 mg, Oral, 4x Daily PRN  [Held by provider] LORazepam (ATIVAN) tablet 0.5 mg, 0.5 mg, Oral, Nightly  metoprolol succinate (TOPROL XL) extended release tablet 25 mg, 25 mg, Oral, BID  insulin lispro (HUMALOG) injection vial 0-4 Units, 0-4 Units, SubCUTAneous, TID WC  insulin lispro (HUMALOG) injection vial 0-4 Units, 0-4 Units, SubCUTAneous, Nightly  budesonide (PULMICORT) nebulizer suspension 500 mcg, 0.5 mg, Nebulization, BID  sodium chloride flush 0.9 % injection 10 mL, 10 mL, IntraVENous, 2 times per day  sodium chloride flush 0.9 % injection 10 mL, 10 mL, IntraVENous, PRN  0.9 % sodium chloride infusion, , IntraVENous, PRN  promethazine (PHENERGAN) tablet 12.5 mg, 12.5 mg, Oral, Q6H PRN **OR** ondansetron (ZOFRAN) injection 4 mg, 4 mg, IntraVENous, Q6H PRN  polyethylene glycol (GLYCOLAX) packet 17 g, 17 g, Oral, Daily PRN  acetaminophen (TYLENOL) tablet 650 mg, 650 mg, Oral, Q6H PRN **OR** acetaminophen (TYLENOL) suppository 650 mg, 650 mg, Rectal, Q6H PRN  miconazole (MICOTIN) 2 % powder, , Topical, BID  arformoterol tartrate (BROVANA) nebulizer solution 15 mcg, 15 mcg, Nebulization, BID  pantoprazole (PROTONIX) tablet 40 mg, 40 mg, Oral, QAM AC  glucose chewable tablet 16 g, 4 tablet, Oral, PRN  dextrose bolus 10% 125 mL, 125 mL, IntraVENous, PRN **OR** dextrose bolus 10% 250 mL, 250 mL, IntraVENous, PRN  glucagon (rDNA) injection 1 mg, 1 mg, SubCUTAneous, PRN  dextrose 10 % infusion, , IntraVENous, Continuous PRN  apixaban (ELIQUIS) tablet 5 mg, 5 mg, Oral, BID  mirtazapine (REMERON) tablet 7.5 mg, 7.5 mg, Oral, Nightly  carbidopa-levodopa (SINEMET CR)  MG per extended release tablet 1 tablet, 1 tablet, Oral, TID  sertraline (ZOLOFT) tablet 50 mg, 50 mg, Oral, Daily  montelukast (SINGULAIR) tablet 10 mg, 10 mg, Oral, Nightly  Physical    VITALS:  BP (!) 116/57   Pulse 99   Temp 97.5 °F (36.4 °C) (Oral)   Resp 20   Ht 5' (1.524 m)   Wt 268 lb 12.8 oz (121.9 kg)   SpO2 100%   BMI 52.50 kg/m²   No acute distress moist membranes   Normocephalic, without obvious abnormality, atraumatic, external ears without lesions,   Neck supple no cervical lymphadenopathy  Heart has a regular rate and rhythm no murmur  Lungs are clear to auscultation bilaterally with equal movements. Abdomen is soft nontender nondistended no rebound or guarding. No significant peripheral   Anasarca  good peripheral perfusion. No significant rashes or new skin lesions. No new focality on neuro exam which is overall grossly intact. Affect and mood seem to be appropriate     Data    CBC:   Lab Results   Component Value Date/Time    WBC 7.2 01/30/2023 05:00 AM    RBC 2.98 01/30/2023 05:00 AM    HGB 7.7 01/30/2023 05:00 AM    HCT 26.6 01/30/2023 05:00 AM    MCV 89.3 01/30/2023 05:00 AM    MCH 25.8 01/30/2023 05:00 AM    MCHC 28.9 01/30/2023 05:00 AM    RDW 16.2 01/30/2023 05:00 AM    PLT 75 01/30/2023 05:00 AM    MPV 11.6 01/30/2023 05:00 AM     BMP:    Lab Results   Component Value Date/Time     02/04/2023 07:26 AM    K 3.4 02/04/2023 07:26 AM    K 3.2 02/01/2023 09:16 AM    CL 95 02/04/2023 07:26 AM    CO2 41 02/04/2023 07:26 AM    BUN 49 02/04/2023 07:26 AM    LABALBU 3.0 01/30/2023 05:00 AM    CREATININE 2.3 02/04/2023 07:26 AM    CALCIUM 8.3 02/04/2023 07:26 AM    GFRAA >60 10/16/2022 09:20 AM    LABGLOM 22 02/04/2023 07:26 AM    GLUCOSE 125 02/04/2023 07:26 AM       ASSESSMENT AND PLAN    Summary of stay: This is a 67 y.o. female  has a past medical history of A-fib ([facility] [address] Acute on chronic congestive heart failure ([facility] [address] Anxiety, Asthma, CAD (coronary artery disease), Cancer ([facility] [address] Chronic kidney disease, COPD exacerbation ([facility] [address] Depression, Diabetes mellitus ([facility] [address] H/O mammogram, Hx MRSA infection, Hyperlipidemia, Hypertension, Lateral epicondylitis, Morbid obesity ([facility] [address] SERENA on CPAP, Oxygen dependent, Parkinson's disease ([facility] [address] and Tubal ligation status. presented with SOB, hypotension  for last few days prior to arrival to the hospital. SOB is associated with some cough, nonproductive but no fever or chills reported. Initially SOB was mild, intermittent but gradually getting more persistent. Started gradually. Exacerbated by general activity or exertion.  Relieved only partially by rest. In ED patient was found to be hypotensive with Afib RVR and respiratory failure and was placed on BIPAP. BP improved after midodrine dose. The patient was seen and examined at bedside, appears alert and awake with no acute distress and is able to answer simple  questions. On direct questioning, patient denied any  resting ongoing chest pain, hemoptysis, productive cough, fever, ongoing palpitation, active abdominal pain, hematemesis, rectal bleeding, blessing, hematuria, any other  and GI complaints, and any new focal neuro deficits     Acute on chronic respiratory failure with hypoxia and hypercapnia & Obesity hypoventilation syndrome: s/p Bumex Drip per Nephro. Per pulm. High flow o2 8 L FiO2 55% unchanged  Congestive heart failure: decompensated. Anasarca. Diuresis per nephro. Thoracentesis done by IR on 2/1: 1000cc of clear yellow color pleural fluid drained. I doubt that we are getting accurate weights with a rate of 199 pounds on 1 February followed by a weight of 268 pounds on 5 February I am ordering standing weights twice a day which might be limited by her debility but at least we should we will make the attempt. Nephrology is considering RRT/HD to unload her massive hypohypervolemia. I would encourage them to take more assertive action if possible to achieve the goal of decreasing her anasarca  Atrial Fibrillation: restart Eliquis 2/2. Was on hold for thorocoentesis   UTI: vre on cx s/p zyvox stopped by ID  Acute on chronic Kidney disease:  Renal function is stable with crt of 2.1 on 2/3  Dm type 2: Fighting scale insulin  Parkinson disease:  sinemet cr. Pulm Htn: Pulm following  S/p hypokalemia   DVT prophylaxis: Eliquis   full code. Palliative care confirmed full code.    Disposition: community skilled if she is not accepted by [facility], [facility]

## 2023-02-05 NOTE — PROGRESS NOTES
[facility] MD Sara Murdock, MD Daniela Clancy, MD Umberto Gitelman, CNP   Joyce Devlin, ANP  Sabino Leon, BRADLEY  Progress Note    2/5/2023    SUBJECTIVE:   1/20: Feeling little better today. Denies new complaint. Still tachypnea, though denies dyspnea no cp/palp Appetite ok ROS otherwise unremarkable    1//21 seen in her room on o2/nc bp stable weak poor po intake   2+ edema in LE     1/22 charts reviewed . On bipap    1/23 : on o2/nc . Still look hypervolemic     1/24 seen in her room comfortable o2/nc . Still with LE edema which seems to be multifactorial and will likely need to accept having some edema on board     1/25 sitting on edge of the bed working with pt . Still with considerable edema in LEs   BP stable     1/26 seen in her room reports of SOB with minimal activity , LE edema still signficant . 1/27 good UO On 5 L/o2/nc  Still with sob with activity Tachycardiac with low functional capacity     1/28: Asleep, resting and breathing comfortably on nasal cannula. Remains edematous. Ongoing fatigue and generalized weakness. 1/29: Hypotensive last evening, Bumex drip stopped, IV normal saline bolus administered to effect. Breathing little more easily though still on AVAPS. To be downgraded to CPAP shortly. Thirsty. Still markedly edematous. 1/30: Somnolent, though arousable. On CPAP. Ongoing fatigue and malaise with generalized weakness    1/31 seen in her room , skin wrinkling in LE on HFNC . BP on lower side  For thoracentesis today   Dw RN .     2/1: Seen while laying in bed, no acute distress. Tells me that she is thirsty. She is on heated high flow nasal canula. Feels that her breathing has improved slightly. Plan is for thoracentesis later this afternoon, could not be done yesterday due to elevated INR. Still with skin wrinkling to bilateral lower extremities.      2/2 1 L removed with thoracentesis   O2 requirement better on 8 L o2/nc Very weak and deconditioned   Labile BP numbers    2/2 o2/nc LE UO ok remain weak decondtioned . Edema in LE getting somewhat worse    2/4 remain with significant depdent edema deconditioned in bed     PROBLEM LIST:    Principal Problem:    Acute on chronic respiratory failure with hypoxia and hypercapnia (HCC)  Active Problems:    Palliative care encounter  Resolved Problems:    * No resolved hospital problems. *         DIET:    ADULT ORAL NUTRITION SUPPLEMENT; Dinner; Other Oral Supplement; Gelatein 20  ADULT DIET; Regular; 3 carb choices (45 gm/meal); Low Fat/Low Chol/High Fiber/2 gm Na;  Moderately Thick (Honey); 1200 ml     MEDS (scheduled):    digoxin  125 mcg IntraVENous Daily    metoprolol succinate  50 mg Oral BID    acetaZOLAMIDE (DIAMOX) IVPB  500 mg IntraVENous Q12H    [Held by provider] bumetanide  2 mg IntraVENous BID    [Held by provider] dilTIAZem  60 mg Oral 3 times per day    sucralfate  1 g Oral 4x Daily    rOPINIRole  1 mg Oral TID    atorvastatin  20 mg Oral Nightly    ipratropium-albuterol  1 vial Inhalation 4x Daily    [Held by provider] insulin glargine  13 Units SubCUTAneous BID    [Held by provider] LORazepam  0.5 mg Oral Nightly    insulin lispro  0-4 Units SubCUTAneous TID WC    insulin lispro  0-4 Units SubCUTAneous Nightly    budesonide  0.5 mg Nebulization BID    sodium chloride flush  10 mL IntraVENous 2 times per day    miconazole   Topical BID    arformoterol tartrate  15 mcg Nebulization BID    pantoprazole  40 mg Oral QAM AC    apixaban  5 mg Oral BID    mirtazapine  7.5 mg Oral Nightly    carbidopa-levodopa  1 tablet Oral TID    sertraline  50 mg Oral Daily    montelukast  10 mg Oral Nightly       MEDS (infusions):   sodium chloride 25 mL/hr at 02/04/23 0222    dextrose         MEDS (prn):  sodium chloride, loperamide, sodium chloride flush, sodium chloride, promethazine **OR** ondansetron, polyethylene glycol, acetaminophen **OR** acetaminophen, glucose, dextrose bolus **OR** dextrose bolus, glucagon (rDNA), dextrose    PHYSICAL EXAM:     Patient Vitals for the past 24 hrs:   BP Temp Temp src Pulse Resp SpO2 Weight   02/05/23 1342 -- -- -- -- 20 -- --   02/05/23 1000 -- -- -- -- 20 -- --   02/05/23 0909 -- -- -- -- -- -- 268 lb 12.8 oz (121.9 kg)   02/05/23 0833 (!) 116/57 -- -- 99 -- -- --   02/05/23 0828 (!) 116/57 -- -- 99 -- -- --   02/05/23 0800 (!) 116/57 97.5 °F (36.4 °C) Oral -- -- -- --   02/05/23 0734 (!) 116/57 -- Oral 99 16 -- --   02/05/23 0732 -- -- -- -- -- 100 % --   02/05/23 0337 -- -- -- -- 20 -- --   02/05/23 0033 -- -- -- -- 21 -- --   02/05/23 0029 122/72 -- -- (!) 158 -- -- --   02/04/23 2008 (!) 127/59 97.9 °F (36.6 °C) Axillary (!) 129 20 100 % --   @      Intake/Output Summary (Last 24 hours) at 2/5/2023 1820  Last data filed at 2/5/2023 1439  Gross per 24 hour   Intake 160 ml   Output 700 ml   Net -540 ml         Wt Readings from Last 3 Encounters:   02/05/23 268 lb 12.8 oz (121.9 kg)   01/16/23 259 lb (117.5 kg)   01/16/23 259 lb (117.5 kg)     Age-appropriate morbidly obese white woman, though easily arousable, no apparent distress  NC/AT EOMI sclera conjunctive are clear and anicteric mucous membranes are moist  Neck soft supple trachea midline  Lungs clear to ausculation bilaterally, diminished in the bases. Poor inspiratory effort. Regular rhythm normal S1 and S2  Abdomen soft nontender nondistended normal active bowel sounds. Abdominal pannus has substantial edema  Distal extremities, thighs, sacrum have substantial chronic type nonpitting edema, +1 BLE edema, though with skin wrinkling   Pulses 1+ x4  Moves all 4 extremities  Cranial nerves II through XII grossly intact  Awake, alert, interactive and appropriate  Skin tone consistent with chronic venous stasis distally      DATA:    No results for input(s): WBC, HGB, HCT, MCV, PLT in the last 72 hours.       Recent Labs     02/03/23  0525 02/04/23  0726    142   K 3.7 3.4*   CL 96* 95*   CO2 40* 41*   BUN 49* 49*   CREATININE 2.1* 2.3*       Lab Results   Component Value Date    LABPROT 0.5 (H) 01/18/2023    LABPROT 0.5 01/18/2023     On CT no hydro/signs of obstruction    Echo Summary 1/19/2023: No previous echo for comparison. Technically adequate study. Left ventricle size is normal.   Mild concentric left ventricular hypertrophy. Ejection fraction is visually estimated at 65%. No regional wall motion abnormalities seen. Indeterminate diastolic function. Right ventricle global systolic function is mildly reduced . Physiologic and/or trace mitral regurgitation is present. Moderate tricuspid regurgitation. RVSP is 47 mmHg. Pulmonary hypertension is mild to moderate . Urine studies (1/18/2023)  U Na 21, U Cl 23 U prot:cr ratio 0.5    ASSESSMENT / RECOMMENDATIONS:       Assessment  1. Acute kidney injury urine lytes support decrease effective volume     2. CKD stage III, Baseline creatinine 1.4 to 1.7 mg/dL, secondary to diabetic nephropathy and renal microvascular atherosclerotic disease  0.5 g proteinuria     3. Anemia due to CKD, phlebotomy associated prolonged hospitalization     4. Acute hypercapnic and hypoxic respiratory failure due to COPD exacerbation and pneumonia. Does not appear hypervolemic. 5.  Morbid obesity, BMI 50.6 kg per metered square     6. Edema/anasarca, chronic, multifactorial, due to venous insufficiency in the setting of morbid obesity, relative immobility, likely diastolic dysfunction though it was \"indeterminate\" on echo, the does have pulmonary hypertension, mild right-sided heart failure    7.   Hypotension       Creatinine stable   1 L removed with thoracentesis       Recommendations    With HR in 120s will hold bumex   Continue diamox   Replace k   PT/OT   Nutrition support   We might to consider RRT/HD to unload her massive hypervolemia   Overall prognosis poor with her being so deconditioned and weak     Electronically signed by Rita Felix Christa Mann MD on 2/5/2023

## 2023-02-05 NOTE — PROGRESS NOTES
OCCUPATIONAL THERAPY BEDSIDE TREATMENT NOTE   [employer] [facility]  [facility] [place]. [place]     Date:2023  Patient Name: Sameera Edgar  MRN: [id number]  : 1950  Room: [address] [place] [address]     Evaluating OT: Rama Sanchez, OTR/L; [id number]     Referring Provider and Orders/Date:    OT eval and treat  Start:  23,   End:  23,   ONE TIME,   Standing Count:  1 Occurrences,   oRbin Pop MD      Diagnosis:   1. Atrial fibrillation with rapid ventricular response ([address]    2. Acute on chronic respiratory failure with hypoxia and hypercapnia (HCC)    3. Recurrent right pleural effusion    4. NSTEMI (non-ST elevated myocardial infarction) ([address]    5.  Hypotension, unspecified hypotension type          Surgery: none      Pertinent Medical History:        Past Medical History        Past Medical History:   Diagnosis Date    A-fib ([address]      Acute on chronic congestive heart failure (HCC)      Anxiety      Asthma      CAD (coronary artery disease) 2016    Cancer ([address]  breast ca 2006     right lumpectomy    Chronic kidney disease       nephrolithiasis    COPD exacerbation ([address] 10/12/2022    Depression      Diabetes mellitus ([address]      H/O mammogram      Hx MRSA infection       toe infection 2012    Hyperlipidemia      Hypertension      Lateral epicondylitis      Morbid obesity (HCC)      SERENA on CPAP      Oxygen dependent      Parkinson's disease ([address]      Tubal ligation status               Past Surgical History         Past Surgical History:   Procedure Laterality Date    BREAST LUMPECTOMY        BREAST REDUCTION SURGERY        CARDIAC CATHETERIZATION   2014     Dr. Alice Serrano   2022     Dr. Mary Johnson   7/29/15    CT GUIDED CHEST TUBE   2022     CT GUIDED CHEST TUBE 2022 Luis Daniel Purcell MD [facility]    ENDOSCOPY, COLON, DIAGNOSTIC   7/19/15 GALLBLADDER SURGERY        LUMBAR LAMINECTOMY        TOE AMPUTATION        TONSILLECTOMY        UPPER GASTROINTESTINAL ENDOSCOPY        UPPER GASTROINTESTINAL ENDOSCOPY N/A 7/19/2022     EGD ESOPHAGOGASTRODUODENOSCOPY performed by Rudy Armenta MD at [facility] ENDOSCOPY           Precautions:  Fall Risk, bipap, 7L O2 via high flow nasal canula, genao, contact isolation, VRE        Recommended placement: Subacute      Assessment of current deficits     [x] Functional mobility           [x]ADLs           [x] Strength                  []Cognition     [x] Functional transfers         [x] IADLs         [x] Safety Awareness   [x]Endurance     [] Fine Coordination                        [x] Balance      [] Vision/perception   []Sensation       [x]Gross Motor Coordination            [] ROM           [] Delirium                   [] Motor Control      OT PLAN OF CARE   OT POC based on physician orders, patient diagnosis and results of clinical assessment     Frequency/Duration 1-3 days/wk for 2 weeks PRN   Specific OT Treatment Interventions to include:   * Instruction/training on adapted ADL techniques and AE recommendations to increase functional independence within precautions       * Training on energy conservation strategies, correct breathing pattern and techniques to improve independence/tolerance for self-care routine  * Functional transfer/mobility training/DME recommendations for increased independence, safety, and fall prevention  * Patient/Family education to increase follow through with safety techniques and functional independence  * Recommendation of environmental modifications for increased safety with functional transfers/mobility and ADLs  * Therapeutic exercise to improve motor endurance, ROM, and functional strength for ADLs/functional transfers  * Therapeutic activities to facilitate/challenge dynamic balance, stand tolerance for increased safety and independence with ADLs  * Therapeutic activities to facilitate gross/fine motor skills for increased independence with ADLs  * Neuro-muscular re-education: facilitation of righting/equilibrium reactions, midline orientation, scapular stability/mobility, normalization of muscle tone, and facilitation of volitional active controled movement  * Positioning to improve skin integrity, interaction with environment and functional independence      Recommended Adaptive Equipment/DME:  TBD       Home Living: Pt is a long term resident of a SNF and plans to return at [place]. Bathroom setup: roll in shower chair; grab bars by the toilet and 3:1 over top for safety. DME owned: wc and walker      Prior Level of Function: assist with ADLs , dep with IADLs; ambulated with wc mostly, but transferring with ww and assist   Driving: no   Occupation: none   Enjoys: reading, socializing, phone-games     Pain Level: none  Cognition: A&O: 2/4 not to time or situation;  Follows 2 step directions-very fatigued and limited with O2 and bipap              Memory:  Intact              Sequencing:  fair              Problem solving:  fair-              Judgement/safety:  fair-    AM-[facility] Daily Activity Inpatient   How much help is needed for putting on and taking off regular lower body clothing?: Total  How much help is needed for bathing (which includes washing, rinsing, drying)?: Total  How much help is needed for toileting (which includes using toilet, bedpan, or urinal)?: Total  How much help is needed for putting on and taking off regular upper body clothing?: A Lot  How much help is needed for taking care of personal grooming?: A Lot  How much help for eating meals?: A Lot  AM-[facility] Inpatient Daily Activity Raw Score: 9  AM-[facility] Inpatient ADL T-Scale Score : 25.33  ADL Inpatient CMS 0-100% Score: 79.59  ADL Inpatient CMS G-Code Modifier : CL     Functional Assessment:      Initial Eval Status  Date: 1/20/2023     Treatment Status  Date: 2/5/23 STGs = LTGs  Time frame: 10-14 days   Feeding Minimal Assist with modified diet, O2 limitations and fatigue. N/T  Set up   Grooming Moderate Assist with limitations due to bipap. Maximal Assist  Simulated    Stand by Assist    UB Dressing Maximal Assist with gown management from supine  Maximal Assist   Simulated for gown mgmt at bed level    Moderate Assist    LB Dressing Dependent with socks and heel protectors from supine  Dependent to doff/don heel protectors in bed. Assist to lift heels off bed. Maximal Assist    Bathing Maximal Assist from supine level  N/T  Moderate Assist    Toileting Dependent with genao management  Dependent Maximal Assist    Bed Mobility  Pt had just returned to supine after sitting EOB with PT-reported max A with dropping vitals. Body mobility and positioning with max A for comfort and upright posture Maximal Assist x 2 for repositioning in bed with use of TAPs. Supine to sit deferred d/t safety concerns 2/2 patient's lethargy. Supine to sit: Minimal Assist   Sit to supine: Minimal Assist    Functional Transfers NT due to pt overall debility, decreased activity tolerance, balance deficits, safety and fall risk. Dropping O2 with supine tasks  N/T Moderate Assist    Functional Mobility Not appropriate at time of eval. To re-assess at a later time if appropriate. N/T   Not appropriate at time of eval. To re-assess at a later time if appropriate. Balance Sitting:     Static:  NT    Dynamic:NT  Standing: NT   Mod A to correct trunk/promote midline orientation and sitting upright in semi-supine. Sitting:     Static:  fair    Dynamic:fair-  Standing: poor+   Activity Tolerance Vitals with activity: cont bipap 88-90%; 151/84 HR 67; 129/70 with ADLs, 88% HR 70; poor tolerance to therapy overall with pt very fatigued and falling asleep. Unable to tolerate sitting with OT session. Fair/fair minus   SpO2 >95% on 7L throughout. -112 bpm at bed level. Pt lethargic but following commands.  Tolerated chair position in bed up to x 10 mins. Increase sitting tolerance for >15min with stable vital signs for carry over into toileting, functional tranfers and indep in ADLs   Visual/  Perceptual Glasses: not Present; WFL     Reports change in vision since admission: No      NA      Hand Dominance  [x] Right   [] Left     AROM (PROM) Strength Additional Info:  Goal:   RUE  WFL 4-/5 good  and fair FMC/dexterity noted during ADL tasks-tremor  Opposition [x] Intact [] Impaired  Finger to nose [x] Intact [] Impaired 4+/5MMT generally for carry over into self care, functional transfers and functional mobility with AD. LUE WFL 4-/5 good  and fair FMC/dexterity noted during ADL tasks-tremor  Opposition [x] Intact [] Impaired  Finger to nose [x] Intact [] Impaired 5/5MMT generally for carry over into self care, functional transfers and functional mobility with AD. Comments: RN cleared patient for OT. Upon arrival to the room the patient was supine. At end of the session, the patient was supine and positioned for comfort. Call light and phone within reach. Pt required verbal cues and instruction as noted above for safe facilitation and completion of tasks. Therapist provided skilled monitoring of the patient's response during treatment session. Pt displays UE jerks and loss of motor control at times. Prior to and at the end of session, environmental modifications/line management completed for patients safety and efficiency of treatment session. Overall, the patient demonstrates decreased independence with ADLs and bed mobility. Pt limited by generalized weakness, activity tolerance, and overall safety awareness. Pt would benefit from continued skilled OT to increase independence with BADL's and IADL's. Treatment: OT treatment provided this date includes:  Instructions/training on safety, sequencing, and adapted techniques for completion of ADLs.   Facilitated bed mobility with cues for proper body mechanics and sequencing to prepare for ADL completion. Facilitated proper positioning/alignment to improve interaction with environment, breathing, overall functioning and decrease/prevent edema. B UE AAROM completed through available ranges to increase strength/endurance with ADLs     Skilled monitoring of O2 sats - see section above     Pt has made fair progress towards set goals. Continue with current plan of care       Treatment time includes thorough review of current medical information, gathering information on past medical history/social history and prior level of function, completion of standardized testing/informal observation of tasks, assessment of data, and development of POC/Goals.     Time In: 11:20 AM    Time Out: 11:50 AM                  Min Units   OT Eval Low 96249     OT Eval Medium 35005     OT Eval High 36477     OT Re-Eval 51160          ADL/Self Care 83926     Therapeutic Activities 20943 10 1   Therapeutic Ex 24191 15 1   Orthotic Management 40099     Neuro Re-Ed 15029     Non-Billable Time 5 0   TOTAL TIMED TREATMENT 25 2     Berkley Devlin OTR/L #[id number]

## 2023-02-05 NOTE — CONSULTS
CHIEF COMPLAINT: CHF/Afib    HISTORY OF PRESENT ILLNESS: Patient is a 67 y.o. female seen at the request of Marbella Sullivan MD and followed at our office by Dr. See. Patient presented with SOB and hypotension 1/17/2023. Long complex admission to this point. Cardiology consulted to aid CHF and afib. Patient states some SOB. No CP. Very deconditioned.      Past Medical History:   Diagnosis Date    A-fib ([address]     Acute on chronic congestive heart failure (HCC)     Anxiety     Asthma     CAD (coronary artery disease) 01/21/2016    Cancer ([address]  breast ca 2006    right lumpectomy    Chronic kidney disease     nephrolithiasis    COPD exacerbation ([address] 10/12/2022    Depression     Diabetes mellitus ([address]     H/O mammogram     Hx MRSA infection     toe infection january 2012    Hyperlipidemia     Hypertension     Lateral epicondylitis     Morbid obesity (HCC)     SERENA on CPAP     Oxygen dependent     Parkinson's disease ([address]     Tubal ligation status        Patient Active Problem List   Diagnosis    Depression with anxiety    Osteoporosis    Asthma    Hyperlipidemia    Mitral regurgitation    Obstructive sleep apnea syndrome    Psoriasis    Diabetes mellitus ([address]    Parkinson's disease ([address]    Primary hypertension    Microalbuminuria    Morbid obesity (HCC)    RLS (restless legs syndrome)    Generalized weakness    Inability to walk    Hypothyroidism    Chest pain    Acute asthma exacerbation    Asthma exacerbation, mild    Paroxysmal atrial fibrillation (HCC)    Atrial fibrillation with rapid ventricular response (HCC)    Acute on chronic congestive heart failure ([address]    Coronary artery disease involving native coronary artery of native heart without angina pectoris    Dysphagia    Hepatosplenomegaly    Acute decompensated heart failure (HCC)    Acute diastolic (congestive) heart failure (HCC)    Nonrheumatic tricuspid valve regurgitation    Tobacco abuse    Recurrent syncope    Septic shock ([address] Seizure-like activity (HCC)    Acute kidney injury ([facility]    Pancytopenia ([facility]    Thrombocytopenia (HCC)    Encephalopathy    Acute respiratory failure with hypoxia (HCC)    Lactic acidosis    Delirium    Acute on chronic anemia    Pleural effusion, right    Acute respiratory failure with hypoxia and hypercapnia (HCC)    Acute confusion    Acute on chronic diastolic heart failure (HCC)    Macrocytosis    Other specified anemias    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[facility]    Puerperal sepsis with acute hypoxic respiratory failure without septic shock (HCC)    Pulmonary HTN (HCC)    Chronic anticoagulation    RVF (right ventricular failure) (HCC)    Metabolic alkalosis    Sepsis (HCC)    COPD exacerbation (HCC)    Acute on chronic respiratory failure with hypercapnia (HCC)    Persistent atrial fibrillation (HCC)    Hypercapnic respiratory failure (HCC)    Acute on chronic respiratory failure (HCC)    Hyperkalemia    Heart failure (HCC)    Acute renal insufficiency    Hypotension    Anemia    Acute on chronic respiratory failure with hypoxia and hypercapnia (HCC)    Palliative care encounter       Allergies   Allergen Reactions    Ciprofloxacin Nausea And Vomiting    Codeine Itching     Creepy crawly \" feelings    Digoxin And Related Other (See Comments)     History of Digoxin toxicity       Current Facility-Administered Medications   Medication Dose Route Frequency Provider Last Rate Last Admin    digoxin (LANOXIN) injection 125 mcg  125 mcg IntraVENous Daily Mauricio Daley, DO   125 mcg at 02/05/23 1464    acetaZOLAMIDE (DIAMOX) 500 mg in sodium chloride 0.9 % 100 mL IVPB  500 mg IntraVENous Q12H She Sylvester MD   Stopped at 02/05/23 5745    [Held by provider] bumetanide (BUMEX) injection 2 mg  2 mg IntraVENous BID She Sylvester MD   2 mg at 02/04/23 2029    sodium chloride (OCEAN, BABY AYR) 0.65 % nasal spray 1 spray  1 spray Each Nostril PRN Juan Ruiz MD   1 spray at 01/29/23 2051    [Held by provider] dilTIAZem (CARDIZEM) tablet 60 mg  60 mg Oral 3 times per day Joyce Christensen MD   60 mg at 01/20/23 0616    sucralfate (CARAFATE) tablet 1 g  1 g Oral 4x Daily Roro Carbajal MD   1 g at 02/05/23 0840    rOPINIRole (REQUIP) tablet 1 mg  1 mg Oral TID Roro Carbajal MD   1 mg at 02/05/23 1370    atorvastatin (LIPITOR) tablet 20 mg  20 mg Oral Nightly Rroo Carbajal MD   20 mg at 02/04/23 2027    ipratropium-albuterol (DUONEB) nebulizer solution 3 mL  1 vial Inhalation 4x Daily Roro Carbajal MD   3 mL at 02/05/23 0926    [Held by provider] insulin glargine (LANTUS) injection vial 13 Units  13 Units SubCUTAneous BID Roro Carbajal MD   13 Units at 01/27/23 0913    loperamide (IMODIUM) capsule 2 mg  2 mg Oral 4x Daily PRN Roro Carbajal MD        [Held by provider] LORazepam (ATIVAN) tablet 0.5 mg  0.5 mg Oral Nightly Roro Carbajal MD   0.5 mg at 01/20/23 2317    metoprolol succinate (TOPROL XL) extended release tablet 25 mg  25 mg Oral BID Rachel Wilson MD   25 mg at 02/05/23 0828    insulin lispro (HUMALOG) injection vial 0-4 Units  0-4 Units SubCUTAneous TID WC Roro Carabjal MD   1 Units at 02/04/23 1244    insulin lispro (HUMALOG) injection vial 0-4 Units  0-4 Units SubCUTAneous Nightly Roro Carbajal MD   4 Units at 01/18/23 2158    budesonide (PULMICORT) nebulizer suspension 500 mcg  0.5 mg Nebulization BID Roro Carbajal MD   500 mcg at 02/05/23 9424    sodium chloride flush 0.9 % injection 10 mL  10 mL IntraVENous 2 times per day Roro Carbajal MD   10 mL at 02/05/23 0830    sodium chloride flush 0.9 % injection 10 mL  10 mL IntraVENous PRN Roro Carbajal MD        0.9 % sodium chloride infusion   IntraVENous PRN Roro Carbajal MD 25 mL/hr at 02/04/23 0222 New Bag at 02/04/23 0222    promethazine (PHENERGAN) tablet 12.5 mg  12.5 mg Oral Q6H PRN Roro Carbajal MD        Or    ondansetron (ZOFRAN) injection 4 mg  4 mg IntraVENous Q6H PRN Roro Carbajal MD        polyethylene glycol (GLYCOLAX) packet 17 g  17 g Oral Daily PRN Ervin Sethi MD        acetaminophen (TYLENOL) tablet 650 mg  650 mg Oral Q6H PRN Ervin Sethi MD   650 mg at 02/03/23 1014    Or    acetaminophen (TYLENOL) suppository 650 mg  650 mg Rectal Q6H PRN Ervni Sethi MD        miconazole (MICOTIN) 2 % powder   Topical BID Abhishek Case, APRN - CNP   Given at 02/05/23 0829    arformoterol tartrate (BROVANA) nebulizer solution 15 mcg  15 mcg Nebulization BID Abhishek Case, APRN - CNP   15 mcg at 02/05/23 5584    pantoprazole (PROTONIX) tablet 40 mg  40 mg Oral QAM AC Aaron Lowry, APRN - CNP   40 mg at 02/05/23 2761    glucose chewable tablet 16 g  4 tablet Oral PRN Abhishek Case, APRN - CNP        dextrose bolus 10% 125 mL  125 mL IntraVENous PRN Abhishek Case, APRN - CNP        Or    dextrose bolus 10% 250 mL  250 mL IntraVENous PRN Abhishek Case, APRN - CNP        glucagon (rDNA) injection 1 mg  1 mg SubCUTAneous PRN Abhishek Case, APRN - CNP        dextrose 10 % infusion   IntraVENous Continuous PRN Abhishek Case, APRN - CNP        apixaban (ELIQUIS) tablet 5 mg  5 mg Oral BID Abhishek Case, APRN - CNP   5 mg at 02/05/23 0827    mirtazapine (REMERON) tablet 7.5 mg  7.5 mg Oral Nightly Abhishek Case, APRN - CNP   7.5 mg at 02/04/23 2027    [Held by provider] aspirin EC tablet 81 mg  81 mg Oral Daily Aaron Lowry, APRN - CNP   81 mg at 01/30/23 4167    carbidopa-levodopa (SINEMET CR)  MG per extended release tablet 1 tablet  1 tablet Oral TID Abhishek Case, APRN - CNP   1 tablet at 02/05/23 0841    sertraline (ZOLOFT) tablet 50 mg  50 mg Oral Daily Abhishek Case, APRN - CNP   50 mg at 02/05/23 0830    montelukast (SINGULAIR) tablet 10 mg  10 mg Oral Nightly Abhishek Case, APRN - CNP   10 mg at 02/04/23 2027       Social History     Socioeconomic History    Marital status:       Spouse name: Not on file    Number of children: Not on file    Years of education: Not on file    Highest education level: Not on file   Occupational History    Not on file   Tobacco Use    Smoking status: Never    Smokeless tobacco: Never   Vaping Use    Vaping Use: Never used   Substance and Sexual Activity    Alcohol use: No    Drug use: No    Sexual activity: Never   Other Topics Concern    Not on file   Social History Narrative    Not on file     Social Determinants of Health     Financial Resource Strain: Not on file   Food Insecurity: Not on file   Transportation Needs: Not on file   Physical Activity: Not on file   Stress: Not on file   Social Connections: Not on file   Intimate Partner Violence: Not on file   Housing Stability: Not on file       Family History   Problem Relation Age of Onset    Cancer Mother         Lung Ca    Cancer Father         lung Ca    Diabetes Sister     Heart Disease Sister     Seizures Son     Other Brother         sepsis       Review of Systems:   Heart: as above   Lungs: as above   Eyes: denies changes in vision or discharge. Ears: denies changes in hearing or pain. Nose: denies epistaxis or masses   Throat: denies sore throat or trouble swallowing. Neuro: denies numbness, tingling, tremors. Skin: denies rashes or itching. : denies hematuria, dysuria   GI: denies vomiting, diarrhea   Psych: denies mood changed, anxiety, depression. all others negative. Physical Exam   BP (!) 116/57   Pulse 99   Temp 97.5 °F (36.4 °C) (Oral)   Resp 20   Ht 5' (1.524 m)   Wt 268 lb 12.8 oz (121.9 kg)   SpO2 100%   BMI 52.50 kg/m²   Constitutional: Oriented to person, place, and time. Well-developed and well-nourished. No distress. Head: Normocephalic and atraumatic. Eyes: EOM are normal. Pupils are equal, round, and reactive to light. Neck: Normal range of motion. Neck supple. No hepatojugular reflux and no JVD present. Carotid bruit is not present. No tracheal deviation present. No thyromegaly present.    Cardiovascular: Normal rate, regular rhythm, normal heart sounds and intact distal pulses. Exam reveals no gallop and no friction rub. No murmur heard. Pulmonary/Chest: Effort normal and breath sounds normal. No respiratory distress. No wheezes. No rales. No tenderness. Abdominal: Soft. Bowel sounds are normal. No distension and no mass. No tenderness. No rebound and no guarding. Musculoskeletal: Normal range of motion. No edema and no tenderness. Lymphadenopathy:   No cervical adenopathy. No groin adenopathy. Neurological: Alert and oriented to person, place, and time. Skin: Skin is warm and dry. No rash noted. Not diaphoretic. No erythema. Psychiatric: Normal mood and affect.  Behavior is normal.     CBC:   Lab Results   Component Value Date/Time    WBC 7.2 01/30/2023 05:00 AM    RBC 2.98 01/30/2023 05:00 AM    HGB 7.7 01/30/2023 05:00 AM    HCT 26.6 01/30/2023 05:00 AM    MCV 89.3 01/30/2023 05:00 AM    MCH 25.8 01/30/2023 05:00 AM    MCHC 28.9 01/30/2023 05:00 AM    RDW 16.2 01/30/2023 05:00 AM    PLT 75 01/30/2023 05:00 AM    MPV 11.6 01/30/2023 05:00 AM     BMP:   Lab Results   Component Value Date/Time     02/04/2023 07:26 AM    K 3.4 02/04/2023 07:26 AM    K 3.2 02/01/2023 09:16 AM    CL 95 02/04/2023 07:26 AM    CO2 41 02/04/2023 07:26 AM    BUN 49 02/04/2023 07:26 AM    LABALBU 3.0 01/30/2023 05:00 AM    CREATININE 2.3 02/04/2023 07:26 AM    CALCIUM 8.3 02/04/2023 07:26 AM    GFRAA >60 10/16/2022 09:20 AM    LABGLOM 22 02/04/2023 07:26 AM     Magnesium:    Lab Results   Component Value Date/Time    MG 1.6 02/01/2023 09:16 AM     Cardiac Enzymes:   Lab Results   Component Value Date    CKTOTAL 25 01/18/2023    CKTOTAL 32 04/25/2014    CKTOTAL 34 04/25/2014    CKMB 2.1 04/25/2014    CKMB 2.4 04/25/2014    CKMB 1.3 07/20/2013    TROPHS 93 (H) 01/18/2023    TROPHS 114 (H) 01/17/2023    TROPHS 117 (H) 01/17/2023      PT/INR:    Lab Results   Component Value Date/Time    PROTIME 23.7 02/03/2023 05:25 AM    INR 2.1 02/03/2023 05:25 AM     TSH:    Lab Results Component Value Date/Time    TSH 6.520 11/06/2022 05:55 PM     Rhythm Strip: atrial fibrillation. EKG:  Pending. TTE 7/7/22 Telly   Summary   Technically difficult study - limited visualization. Micro-bubble contrast injected to enhance left ventricular visualization. Normal left ventricular size and systolic function. Ejection fraction is visually estimated at 70-75%. Indeterminate diastolic function. No regional wall motion abnormalities seen. Mild left ventricular concentric hypertrophy noted. Moderately dilated right ventricle with reduced function. Biatrial dilation. Mild tricuspid regurgitation. RVSP is at least 60 mmHg. Prominent pericardial fat pad. No definitive evidence of pericardial effusion. Stress test:    Pharm stress 1/2022  Gated SPECT left ventricular ejection fraction was calculated to be   74% with normal wall motion and thickening. TID ratio 1.08.    1.  ECG during the infusion did not change. 2.  The myocardial perfusion imaging was abnormal.   3.  The abnormality was a large sized, moderate fixed defect involving   the inferior and inferolateral wall suggestive prior infarct without   any reversibility. There was also a small sized mild perfusion defect   involving the mid to distal anterior wall suggestive ischemia. 4.  Overall left ventricular systolic function was normal without   regional wall motion abnormalities. 5.  No transient ischemic dilatation. 6.  High risk general pharmacologic stress test.      Cardiac catheterization:   [facility] 1/11/22 Diane  CONCLUSIONS:  1. Coronary artery disease:  A. Left main. No significant disease. B.  LAD. Heavily calcified proximal and mid vessel with 50% mid vessel stenosis. 60% proximal stenosis of a moderate size first diagonal branch. C.  LCX.   50% ostial narrowing of the AV groove branch in the mid vessel which is a bifurcation lesion with a large marginal branch which itself did not appear to have any significant disease. The AV groove branch of  the left circumflex continues to provide a posterior descending artery branch and the posterolateral branch (codominant vessel). D.  RCA. Codominant vessel with no significant angiographic disease. 2.  Normal left ventricular size with hyperdynamic left ventricular systolic function with an estimated ejection fraction of 75%. 3.  Systemic hypertension. 4.  Elevated left ventricular end-diastolic pressure. Echo Summary 1/19/2023: No previous echo for comparison. Technically adequate study. Left ventricle size is normal.   Mild concentric left ventricular hypertrophy. Ejection fraction is visually estimated at 65%. No regional wall motion abnormalities seen. Indeterminate diastolic function. Right ventricle global systolic function is mildly reduced . Physiologic and/or trace mitral regurgitation is present. Moderate tricuspid regurgitation. RVSP is 47 mmHg. Pulmonary hypertension is mild to moderate .      ASSESSMENT AND PLAN:  Patient Active Problem List   Diagnosis    Depression with anxiety    Osteoporosis    Asthma    Hyperlipidemia    Mitral regurgitation    Obstructive sleep apnea syndrome    Psoriasis    Diabetes mellitus ([address]    Parkinson's disease ([address]    Primary hypertension    Microalbuminuria    Morbid obesity (HCC)    RLS (restless legs syndrome)    Generalized weakness    Inability to walk    Hypothyroidism    Chest pain    Acute asthma exacerbation    Asthma exacerbation, mild    Paroxysmal atrial fibrillation (HCC)    Atrial fibrillation with rapid ventricular response (HCC)    Acute on chronic congestive heart failure ([address]    Coronary artery disease involving native coronary artery of native heart without angina pectoris    Dysphagia    Hepatosplenomegaly    Acute decompensated heart failure (HCC)    Acute diastolic (congestive) heart failure (HCC)    Nonrheumatic tricuspid valve regurgitation    Tobacco abuse Recurrent syncope    Septic shock (HCC)    Seizure-like activity (HCC)    Acute kidney injury ([address]    Pancytopenia (HCC)    Thrombocytopenia (HCC)    Encephalopathy    Acute respiratory failure with hypoxia (HCC)    Lactic acidosis    Delirium    Acute on chronic anemia    Pleural effusion, right    Acute respiratory failure with hypoxia and hypercapnia (HCC)    Acute confusion    Acute on chronic diastolic heart failure (HCC)    Macrocytosis    Other specified anemias    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[address]    Puerperal sepsis with acute hypoxic respiratory failure without septic shock (HCC)    Pulmonary HTN (HCC)    Chronic anticoagulation    RVF (right ventricular failure) (HCC)    Metabolic alkalosis    Sepsis (HCC)    COPD exacerbation (HCC)    Acute on chronic respiratory failure with hypercapnia (HCC)    Persistent atrial fibrillation (HCC)    Hypercapnic respiratory failure (HCC)    Acute on chronic respiratory failure (HCC)    Hyperkalemia    Heart failure (HCC)    Acute renal insufficiency    Hypotension    Anemia    Acute on chronic respiratory failure with hypoxia and hypercapnia ([address]    Palliative care encounter     1. Acute on chronic diastolic CHF:    Chart/labs/EKG/echo 1/19/2023 reviewed. Bumex held due to SHANTANU. BB. No ACEI/ARB/ARNI/aldactone due to renal issues. 2. CAD: Cath 1/11/2022 with moderate non-occlusive CAD treated medically. Statin/BB. No ASA due to eliquis. 3. Permanent Afib:    Recurrent despite multiple cardioversion's. Rate control. Eliquis. 4. Bradycardia issues: Monitor. 5. HTN: Observe. 6. Lipids: Statin. 7. DM: Per primary service. 8. COPD/Asthma: On home oxygen. 9. Acute on CKD: Follow labs. Nephrology following. 10. Anemia/Thrombocytopenia: Follow labs. 11. SERENA    12. Parkinson's    13. Breast CA    14. Tobacco use    Malik Kapadia D.O.   Cardiologist  Cardiology, [facility]

## 2023-02-05 NOTE — PROGRESS NOTES
Date:2023  Patient Name: Molina Tompkins  MRN: [id number]  : 1950  ROOM #: [id number]    Occupational Therapy treatment attempted this AM. Patient declined due to LE pain. RN aware. Will re-attempt OT tx at a later time. Thank you.      Dinorah Rick OTR/L #[id number]

## 2023-02-05 NOTE — PROGRESS NOTES
Pulmonary/Critical Care Progress Note    We are following patient for acute respiratory failure, improved right pleural effusion compared to 2/2/2023, increasing BUN and creatinine, diastolic dysfunction, COPD, diabetes mellitus type 2, morbid obesity, obstructive sleep apnea, Parkinson's disease    SUBJECTIVE:  The patient is sleeping on BiPAP. Her saturation is excellent on 55%. We can wean her FiO2 to 45%. I have looked at her chest x-ray done earlier today and it appears as though the right pleural effusion which had been reaccumulating, it is now reduced possibly because of the aggressive diuretic therapy she is on. However, her BUN and creatinine have risen probably as a result of this. The nephrology service will continue to monitor her diuretics, electrolytes, and renal function.     MEDICATIONS:   digoxin  125 mcg IntraVENous Daily    metoprolol succinate  50 mg Oral BID    acetaZOLAMIDE (DIAMOX) IVPB  500 mg IntraVENous Q12H    [Held by provider] bumetanide  2 mg IntraVENous BID    [Held by provider] dilTIAZem  60 mg Oral 3 times per day    sucralfate  1 g Oral 4x Daily    rOPINIRole  1 mg Oral TID    atorvastatin  20 mg Oral Nightly    ipratropium-albuterol  1 vial Inhalation 4x Daily    [Held by provider] insulin glargine  13 Units SubCUTAneous BID    [Held by provider] LORazepam  0.5 mg Oral Nightly    insulin lispro  0-4 Units SubCUTAneous TID WC    insulin lispro  0-4 Units SubCUTAneous Nightly    budesonide  0.5 mg Nebulization BID    sodium chloride flush  10 mL IntraVENous 2 times per day    miconazole   Topical BID    arformoterol tartrate  15 mcg Nebulization BID    pantoprazole  40 mg Oral QAM AC    apixaban  5 mg Oral BID    mirtazapine  7.5 mg Oral Nightly    carbidopa-levodopa  1 tablet Oral TID    sertraline  50 mg Oral Daily    montelukast  10 mg Oral Nightly      sodium chloride 25 mL/hr at 02/04/23 0222    dextrose       sodium chloride, loperamide, sodium chloride flush, sodium chloride, promethazine **OR** ondansetron, polyethylene glycol, acetaminophen **OR** acetaminophen, glucose, dextrose bolus **OR** dextrose bolus, glucagon (rDNA), dextrose      REVIEW OF SYSTEMS:  Constitutional: Denies fever, weight loss, night sweats, and fatigue  Skin: Denies pigmentation, dark lesions, and rashes   HEENT: Denies hearing loss, tinnitus, ear drainage, epistaxis, sore throat, and hoarseness. Cardiovascular: Denies palpitations, chest pain, and chest pressure. Respiratory: Denies cough, dyspnea at rest, hemoptysis, apnea, and choking. Gastrointestinal: Denies nausea, vomiting, poor appetite, diarrhea, heartburn or reflux  Genitourinary: Denies dysuria, frequency, urgency or hematuria  Musculoskeletal: Denies myalgias, muscle weakness, and bone pain  Neurological: Denies dizziness, vertigo, headache, and focal weakness  Psychological: Denies anxiety and depression  Endocrine: Denies heat intolerance and cold intolerance  Hematopoietic/Lymphatic: Denies bleeding problems and blood transfusions    OBJECTIVE:  Vitals:    02/05/23 1342   BP:    Pulse:    Resp: 20   Temp:    SpO2:      FiO2 : 55 %  O2 Flow Rate (L/min): 8 L/min  O2 Device: PAP (positive airway pressure)    PHYSICAL EXAM:  Constitutional: No fever, chills, diaphoresis  Skin: Venous stasis changes in lower extremities  HEENT: Mucous membranes are moist  Neck: No JVD, lymphadenopathy, thyromegaly  Cardiovascular: S1, S2 regular.   No S3 or rubs present  Respiratory: Few crackles mid lung field on right side, clear on left  Gastrointestinal: Soft, obese, nontender  Genitourinary: No CVA tenderness  Extremities: No clubbing, cyanosis, or edema  Neurological: Cannot examine since patient is sleeping on BiPAP  Psychological: Cannot examine currently for above reasons    LABS:  WBC   Date Value Ref Range Status   01/30/2023 7.2 4.5 - 11.5 E9/L Final   01/26/2023 10.6 4.5 - 11.5 E9/L Final   01/22/2023 7.1 4.5 - 11.5 E9/L Final     Hemoglobin Date Value Ref Range Status   01/30/2023 7.7 (L) 11.5 - 15.5 g/dL Final   01/26/2023 9.2 (L) 11.5 - 15.5 g/dL Final   01/22/2023 8.2 (L) 11.5 - 15.5 g/dL Final     Hematocrit   Date Value Ref Range Status   01/30/2023 26.6 (L) 34.0 - 48.0 % Final   01/26/2023 34.6 34.0 - 48.0 % Final   01/22/2023 30.8 (L) 34.0 - 48.0 % Final     MCV   Date Value Ref Range Status   01/30/2023 89.3 80.0 - 99.9 fL Final   01/26/2023 94.3 80.0 - 99.9 fL Final   01/22/2023 93.1 80.0 - 99.9 fL Final     Platelets   Date Value Ref Range Status   01/30/2023 75 (L) 130 - 450 E9/L Final   01/26/2023 169 130 - 450 E9/L Final   01/22/2023 189 130 - 450 E9/L Final     Sodium   Date Value Ref Range Status   02/04/2023 142 132 - 146 mmol/L Final   02/03/2023 143 132 - 146 mmol/L Final   02/02/2023 145 132 - 146 mmol/L Final     Potassium   Date Value Ref Range Status   02/04/2023 3.4 (L) 3.5 - 5.0 mmol/L Final   02/03/2023 3.7 3.5 - 5.0 mmol/L Final   02/02/2023 3.7 3.5 - 5.0 mmol/L Final     Potassium reflex Magnesium   Date Value Ref Range Status   02/01/2023 3.2 (L) 3.5 - 5.0 mmol/L Final   01/20/2023 4.8 3.5 - 5.0 mmol/L Final   01/19/2023 4.9 3.5 - 5.0 mmol/L Final     Chloride   Date Value Ref Range Status   02/04/2023 95 (L) 98 - 107 mmol/L Final   02/03/2023 96 (L) 98 - 107 mmol/L Final   02/02/2023 99 98 - 107 mmol/L Final     CO2   Date Value Ref Range Status   02/04/2023 41 (HH) 22 - 29 mmol/L Final   02/03/2023 40 (H) 22 - 29 mmol/L Final   02/02/2023 38 (H) 22 - 29 mmol/L Final     BUN   Date Value Ref Range Status   02/04/2023 49 (H) 6 - 23 mg/dL Final   02/03/2023 49 (H) 6 - 23 mg/dL Final   02/02/2023 53 (H) 6 - 23 mg/dL Final     Creatinine   Date Value Ref Range Status   02/04/2023 2.3 (H) 0.5 - 1.0 mg/dL Final   02/03/2023 2.1 (H) 0.5 - 1.0 mg/dL Final   02/02/2023 2.2 (H) 0.5 - 1.0 mg/dL Final     Glucose   Date Value Ref Range Status   02/04/2023 125 (H) 74 - 99 mg/dL Final   02/03/2023 131 (H) 74 - 99 mg/dL Final 02/02/2023 158 (H) 74 - 99 mg/dL Final     Calcium   Date Value Ref Range Status   02/04/2023 8.3 (L) 8.6 - 10.2 mg/dL Final   02/03/2023 8.0 (L) 8.6 - 10.2 mg/dL Final   02/02/2023 8.3 (L) 8.6 - 10.2 mg/dL Final     Total Protein   Date Value Ref Range Status   01/30/2023 5.0 (L) 6.4 - 8.3 g/dL Final   01/17/2023 6.1 (L) 6.4 - 8.3 g/dL Final   01/16/2023 6.3 (L) 6.4 - 8.3 g/dL Final     Albumin   Date Value Ref Range Status   01/30/2023 3.0 (L) 3.5 - 5.2 g/dL Final   01/17/2023 3.6 3.5 - 5.2 g/dL Final   01/16/2023 3.8 3.5 - 5.2 g/dL Final     Total Bilirubin   Date Value Ref Range Status   01/30/2023 0.3 0.0 - 1.2 mg/dL Final   01/17/2023 0.5 0.0 - 1.2 mg/dL Final   01/16/2023 0.4 0.0 - 1.2 mg/dL Final     Alkaline Phosphatase   Date Value Ref Range Status   01/30/2023 60 35 - 104 U/L Final   01/17/2023 98 35 - 104 U/L Final   01/16/2023 96 35 - 104 U/L Final     AST   Date Value Ref Range Status   01/30/2023 8 0 - 31 U/L Final   01/17/2023 9 0 - 31 U/L Final   01/16/2023 10 0 - 31 U/L Final     ALT   Date Value Ref Range Status   01/30/2023 <5 0 - 32 U/L Final   01/17/2023 <5 0 - 32 U/L Final   01/16/2023 <5 0 - 32 U/L Final     Est, Glom Filt Rate   Date Value Ref Range Status   02/04/2023 22 >=60 mL/min/1.73 Final     Comment:     Pediatric calculator link  [email or web address] [email or web address]/gfr_calculatorped  Effective Oct 3, 2022  These results are not intended for use in patients  <25years of age. eGFR results are calculated without  a race factor using the 2021 CKD-EPI equation. Careful  clinical correlation is recommended, particularly when  comparing to results calculated using previous equations. The CKD-EPI equation is less accurate in patients with  extremes of muscle mass, extra-renal metabolism of  creatinine, excessive creatinine ingestion, or following  therapy that affects renal tubular secretion.      02/03/2023 24 >=60 mL/min/1.73 Final     Comment:     Pediatric calculator alia Bull.[email or web address]. [email or web address]_[email or web address]  Effective Oct 3, 2022  These results are not intended for use in patients  <25years of age. eGFR results are calculated without  a race factor using the 2021 CKD-EPI equation. Careful  clinical correlation is recommended, particularly when  comparing to results calculated using previous equations. The CKD-EPI equation is less accurate in patients with  extremes of muscle mass, extra-renal metabolism of  creatinine, excessive creatinine ingestion, or following  therapy that affects renal tubular secretion. 02/02/2023 23 >=60 mL/min/1.73 Final     Comment:     Pediatric calculator link  [email or web address]. [email or web address]_[email or web address]  Effective Oct 3, 2022  These results are not intended for use in patients  <25years of age. eGFR results are calculated without  a race factor using the 2021 CKD-EPI equation. Careful  clinical correlation is recommended, particularly when  comparing to results calculated using previous equations. The CKD-EPI equation is less accurate in patients with  extremes of muscle mass, extra-renal metabolism of  creatinine, excessive creatinine ingestion, or following  therapy that affects renal tubular secretion. GFR    Date Value Ref Range Status   10/16/2022 >60  Final   10/14/2022 59  Final   10/13/2022 >60  Final     Magnesium   Date Value Ref Range Status   02/01/2023 1.6 1.6 - 2.6 mg/dL Final   01/30/2023 1.6 1.6 - 2.6 mg/dL Final   01/29/2023 1.7 1.6 - 2.6 mg/dL Final     Phosphorus   Date Value Ref Range Status   01/31/2023 4.5 2.5 - 4.5 mg/dL Final   01/30/2023 5.0 (H) 2.5 - 4.5 mg/dL Final   01/29/2023 4.8 (H) 2.5 - 4.5 mg/dL Final     No results for input(s): PH, PO2, PCO2, HCO3, BE, O2SAT in the last 72 hours. RADIOLOGY:  XR CHEST PORTABLE   Final Result   1. Slight improved aeration of the right lung when compared to prior study   2.  The left lung is grossly clear 3. Cardiomegaly         XR CHEST PORTABLE   Final Result   1. New opacification seen within the right lung base which could represent   partial collapse of the right lower lobe   2. Reaccumulating right pleural effusion   3. Right upper lobe airspace disease   4. There is no pneumothorax   5. The left lung is clear. XR CHEST 1 VIEW   Final Result   Improved ventilation of the right lung, status post thoracentesis with   possible tiny less than 5% right apical pneumothorax. .      Cavitary lesion in the right midlung field which may either represent a   necrotic malignancy versus pneumatosis. Consider CT scan. IR GUIDED THORACENTESIS PLEURAL   Final Result   Successful ultrasound guided thoracentesis. XR CHEST PORTABLE   Final Result   Complete opacification of the right hemithorax related to pleural effusion. Modest contralateral infiltrate and or atelectasis at the left lower chest.         CT ABDOMEN PELVIS WO CONTRAST Additional Contrast? None   Final Result   No nephroureterolithiasis or hydronephrosis. Splenomegaly. Small volume of perihepatic ascites. At least moderate-sized partially imaged right pleural effusion with   bibasilar atelectasis. Several locules of gas which appear to be within the endometrium at the level   of the uterine fundus, of uncertain etiology or clinical significance. Please correlate clinically. Diffuse anasarca throughout the soft tissues. Cardiomegaly. XR CHEST PORTABLE   Final Result   Stable right pleural effusion. PROBLEM LIST:  Principal Problem:    Acute on chronic respiratory failure with hypoxia and hypercapnia (HCC)  Active Problems:    Palliative care encounter  Resolved Problems:    * No resolved hospital problems.  *      IMPRESSION:  Right pleural effusion  Severe diastolic dysfunction  SHANTANU  Cor pulmonale  Acute superimposed on chronic hypoxemic and hypercapnic respiratory failure  Obstructive sleep apnea on BiPAP    PLAN:  Reduce FiO2 from 55 to 45%  Diuretics per nephrology service  Try to wean periods of BiPAP but will need it all night and perhaps an hour or 2 during the day      Electronically signed by Cornelio Weinstein MD on 2/5/2023 at 3:45 PM

## 2023-02-06 LAB
ALBUMIN SERPL-MCNC: 2.9 G/DL (ref 3.5–5.2)
ALP BLD-CCNC: 60 U/L (ref 35–104)
ALT SERPL-CCNC: <5 U/L (ref 0–32)
ANION GAP SERPL CALCULATED.3IONS-SCNC: 5 MMOL/L (ref 7–16)
AST SERPL-CCNC: 7 U/L (ref 0–31)
B.E.: 15.2 MMOL/L (ref -3–3)
BILIRUB SERPL-MCNC: 0.4 MG/DL (ref 0–1.2)
BUN BLDV-MCNC: 44 MG/DL (ref 6–23)
CALCIUM SERPL-MCNC: 8.3 MG/DL (ref 8.6–10.2)
CHLORIDE BLD-SCNC: 94 MMOL/L (ref 98–107)
CO2: 42 MMOL/L (ref 22–29)
COHB: 0.2 % (ref 0–1.5)
COMMENT: ABNORMAL
CREAT SERPL-MCNC: 2.3 MG/DL (ref 0.5–1)
CRITICAL: ABNORMAL
DATE ANALYZED: ABNORMAL
DATE OF COLLECTION: ABNORMAL
FERRITIN: 60 NG/ML
GFR SERPL CREATININE-BSD FRML MDRD: 22 ML/MIN/1.73
GLUCOSE BLD-MCNC: 117 MG/DL (ref 74–99)
HCO3: 45.1 MMOL/L (ref 22–26)
HCT VFR BLD CALC: 27.5 % (ref 34–48)
HEMOGLOBIN: 7.8 G/DL (ref 11.5–15.5)
HHB: 2.9 % (ref 0–5)
IRON SATURATION: 12 % (ref 15–50)
IRON: 33 MCG/DL (ref 37–145)
LAB: ABNORMAL
Lab: ABNORMAL
MAGNESIUM: 1.5 MG/DL (ref 1.6–2.6)
MCH RBC QN AUTO: 25 PG (ref 26–35)
MCHC RBC AUTO-ENTMCNC: 28.4 % (ref 32–34.5)
MCV RBC AUTO: 88.1 FL (ref 80–99.9)
METER GLUCOSE: 135 MG/DL (ref 74–99)
METER GLUCOSE: 160 MG/DL (ref 74–99)
METER GLUCOSE: 163 MG/DL (ref 74–99)
METER GLUCOSE: 172 MG/DL (ref 74–99)
METHB: 0.6 % (ref 0–1.5)
MODE: ABNORMAL
O2 CONTENT: 12.9 ML/DL
O2 SATURATION: 97.1 % (ref 92–98.5)
O2HB: 96.3 % (ref 94–97)
OPERATOR ID: ABNORMAL
PATIENT TEMP: 37 C
PCO2: 101.4 MMHG (ref 35–45)
PDW BLD-RTO: 16.8 FL (ref 11.5–15)
PH BLOOD GAS: 7.27 (ref 7.35–7.45)
PHOSPHORUS: 3.6 MG/DL (ref 2.5–4.5)
PLATELET # BLD: 135 E9/L (ref 130–450)
PMV BLD AUTO: 12.3 FL (ref 7–12)
PO2: 107.1 MMHG (ref 75–100)
POTASSIUM SERPL-SCNC: 3.2 MMOL/L (ref 3.5–5)
RBC # BLD: 3.12 E12/L (ref 3.5–5.5)
SODIUM BLD-SCNC: 141 MMOL/L (ref 132–146)
SOURCE, BLOOD GAS: ABNORMAL
THB: 9.4 G/DL (ref 11.5–16.5)
TIME ANALYZED: 1459
TOTAL IRON BINDING CAPACITY: 273 MCG/DL (ref 250–450)
TOTAL PROTEIN: 5 G/DL (ref 6.4–8.3)
WBC # BLD: 6.1 E9/L (ref 4.5–11.5)

## 2023-02-06 PROCEDURE — 82728 ASSAY OF FERRITIN: CPT

## 2023-02-06 PROCEDURE — 2500000003 HC RX 250 WO HCPCS: Performed by: NURSE PRACTITIONER

## 2023-02-06 PROCEDURE — 2060000000 HC ICU INTERMEDIATE R&B

## 2023-02-06 PROCEDURE — 6370000000 HC RX 637 (ALT 250 FOR IP): Performed by: INTERNAL MEDICINE

## 2023-02-06 PROCEDURE — 97530 THERAPEUTIC ACTIVITIES: CPT | Performed by: PHYSICAL THERAPIST

## 2023-02-06 PROCEDURE — 94640 AIRWAY INHALATION TREATMENT: CPT

## 2023-02-06 PROCEDURE — 82962 GLUCOSE BLOOD TEST: CPT

## 2023-02-06 PROCEDURE — 6370000000 HC RX 637 (ALT 250 FOR IP): Performed by: NURSE PRACTITIONER

## 2023-02-06 PROCEDURE — 2700000000 HC OXYGEN THERAPY PER DAY

## 2023-02-06 PROCEDURE — 94660 CPAP INITIATION&MGMT: CPT

## 2023-02-06 PROCEDURE — 99233 SBSQ HOSP IP/OBS HIGH 50: CPT | Performed by: INTERNAL MEDICINE

## 2023-02-06 PROCEDURE — 83550 IRON BINDING TEST: CPT

## 2023-02-06 PROCEDURE — 97110 THERAPEUTIC EXERCISES: CPT | Performed by: PHYSICAL THERAPIST

## 2023-02-06 PROCEDURE — 83735 ASSAY OF MAGNESIUM: CPT

## 2023-02-06 PROCEDURE — 2580000003 HC RX 258: Performed by: INTERNAL MEDICINE

## 2023-02-06 PROCEDURE — 2500000003 HC RX 250 WO HCPCS: Performed by: INTERNAL MEDICINE

## 2023-02-06 PROCEDURE — 6360000002 HC RX W HCPCS: Performed by: FAMILY MEDICINE

## 2023-02-06 PROCEDURE — 83540 ASSAY OF IRON: CPT

## 2023-02-06 PROCEDURE — 82805 BLOOD GASES W/O2 SATURATION: CPT

## 2023-02-06 PROCEDURE — 36415 COLL VENOUS BLD VENIPUNCTURE: CPT

## 2023-02-06 PROCEDURE — 84100 ASSAY OF PHOSPHORUS: CPT

## 2023-02-06 PROCEDURE — 80053 COMPREHEN METABOLIC PANEL: CPT

## 2023-02-06 PROCEDURE — 6360000002 HC RX W HCPCS: Performed by: INTERNAL MEDICINE

## 2023-02-06 PROCEDURE — 6360000002 HC RX W HCPCS: Performed by: NURSE PRACTITIONER

## 2023-02-06 PROCEDURE — 85027 COMPLETE CBC AUTOMATED: CPT

## 2023-02-06 RX ORDER — BUMETANIDE 0.25 MG/ML
2 INJECTION, SOLUTION INTRAMUSCULAR; INTRAVENOUS ONCE
Status: COMPLETED | OUTPATIENT
Start: 2023-02-06 | End: 2023-02-06

## 2023-02-06 RX ORDER — POTASSIUM CHLORIDE 20 MEQ/1
40 TABLET, EXTENDED RELEASE ORAL ONCE
Status: COMPLETED | OUTPATIENT
Start: 2023-02-06 | End: 2023-02-06

## 2023-02-06 RX ADMIN — APIXABAN 5 MG: 5 TABLET, FILM COATED ORAL at 08:43

## 2023-02-06 RX ADMIN — MIRTAZAPINE 7.5 MG: 15 TABLET, FILM COATED ORAL at 20:11

## 2023-02-06 RX ADMIN — ANTI-FUNGAL POWDER MICONAZOLE NITRATE TALC FREE: 1.42 POWDER TOPICAL at 20:13

## 2023-02-06 RX ADMIN — ROPINIROLE HYDROCHLORIDE 1 MG: 1 TABLET, FILM COATED ORAL at 20:11

## 2023-02-06 RX ADMIN — SUCRALFATE 1 G: 1 TABLET ORAL at 20:11

## 2023-02-06 RX ADMIN — MONTELUKAST SODIUM 10 MG: 10 TABLET, FILM COATED ORAL at 20:11

## 2023-02-06 RX ADMIN — METOPROLOL SUCCINATE 50 MG: 50 TABLET, EXTENDED RELEASE ORAL at 08:43

## 2023-02-06 RX ADMIN — IPRATROPIUM BROMIDE AND ALBUTEROL SULFATE 3 ML: .5; 2.5 SOLUTION RESPIRATORY (INHALATION) at 10:04

## 2023-02-06 RX ADMIN — BUDESONIDE 500 MCG: 0.5 SUSPENSION RESPIRATORY (INHALATION) at 06:34

## 2023-02-06 RX ADMIN — ARFORMOTEROL TARTRATE 15 MCG: 15 SOLUTION RESPIRATORY (INHALATION) at 06:34

## 2023-02-06 RX ADMIN — BUMETANIDE 0.5 MG/HR: 0.25 INJECTION INTRAMUSCULAR; INTRAVENOUS at 17:48

## 2023-02-06 RX ADMIN — DIGOXIN 125 MCG: 0.25 INJECTION INTRAMUSCULAR; INTRAVENOUS at 08:43

## 2023-02-06 RX ADMIN — ANTI-FUNGAL POWDER MICONAZOLE NITRATE TALC FREE: 1.42 POWDER TOPICAL at 08:42

## 2023-02-06 RX ADMIN — IPRATROPIUM BROMIDE AND ALBUTEROL SULFATE 3 ML: .5; 2.5 SOLUTION RESPIRATORY (INHALATION) at 15:15

## 2023-02-06 RX ADMIN — IPRATROPIUM BROMIDE AND ALBUTEROL SULFATE 3 ML: .5; 2.5 SOLUTION RESPIRATORY (INHALATION) at 19:50

## 2023-02-06 RX ADMIN — Medication 10 ML: at 20:13

## 2023-02-06 RX ADMIN — ACETAZOLAMIDE SODIUM 500 MG: 500 INJECTION, POWDER, LYOPHILIZED, FOR SOLUTION INTRAVENOUS at 02:45

## 2023-02-06 RX ADMIN — BUMETANIDE 2 MG: 0.25 INJECTION, SOLUTION INTRAMUSCULAR; INTRAVENOUS at 17:42

## 2023-02-06 RX ADMIN — ATORVASTATIN CALCIUM 20 MG: 20 TABLET, FILM COATED ORAL at 20:11

## 2023-02-06 RX ADMIN — PANTOPRAZOLE SODIUM 40 MG: 40 TABLET, DELAYED RELEASE ORAL at 05:35

## 2023-02-06 RX ADMIN — ROPINIROLE HYDROCHLORIDE 1 MG: 1 TABLET, FILM COATED ORAL at 14:13

## 2023-02-06 RX ADMIN — ACETAZOLAMIDE SODIUM 500 MG: 500 INJECTION, POWDER, LYOPHILIZED, FOR SOLUTION INTRAVENOUS at 14:13

## 2023-02-06 RX ADMIN — BUDESONIDE 500 MCG: 0.5 SUSPENSION RESPIRATORY (INHALATION) at 19:49

## 2023-02-06 RX ADMIN — SUCRALFATE 1 G: 1 TABLET ORAL at 16:49

## 2023-02-06 RX ADMIN — APIXABAN 5 MG: 5 TABLET, FILM COATED ORAL at 20:11

## 2023-02-06 RX ADMIN — IPRATROPIUM BROMIDE AND ALBUTEROL SULFATE 3 ML: .5; 2.5 SOLUTION RESPIRATORY (INHALATION) at 06:34

## 2023-02-06 RX ADMIN — CARBIDOPA AND LEVODOPA 1 TABLET: 50; 200 TABLET, EXTENDED RELEASE ORAL at 14:13

## 2023-02-06 RX ADMIN — SUCRALFATE 1 G: 1 TABLET ORAL at 14:13

## 2023-02-06 RX ADMIN — POTASSIUM CHLORIDE 40 MEQ: 1500 TABLET, EXTENDED RELEASE ORAL at 16:50

## 2023-02-06 RX ADMIN — SUCRALFATE 1 G: 1 TABLET ORAL at 08:43

## 2023-02-06 RX ADMIN — SERTRALINE 50 MG: 50 TABLET, FILM COATED ORAL at 08:43

## 2023-02-06 RX ADMIN — CARBIDOPA AND LEVODOPA 1 TABLET: 50; 200 TABLET, EXTENDED RELEASE ORAL at 08:43

## 2023-02-06 RX ADMIN — CARBIDOPA AND LEVODOPA 1 TABLET: 50; 200 TABLET, EXTENDED RELEASE ORAL at 20:11

## 2023-02-06 RX ADMIN — Medication 10 ML: at 08:44

## 2023-02-06 RX ADMIN — ROPINIROLE HYDROCHLORIDE 1 MG: 1 TABLET, FILM COATED ORAL at 08:43

## 2023-02-06 RX ADMIN — ARFORMOTEROL TARTRATE 15 MCG: 15 SOLUTION RESPIRATORY (INHALATION) at 19:49

## 2023-02-06 NOTE — PLAN OF CARE
Problem: Discharge Planning  Goal: Discharge to home or other facility with appropriate resources  Outcome: Progressing  Flowsheets (Taken 2/6/2023 0745)  Discharge to home or other facility with appropriate resources:   Identify barriers to discharge with patient and caregiver   Arrange for needed discharge resources and transportation as appropriate     Problem: Respiratory - Adult  Goal: Achieves optimal ventilation and oxygenation  Outcome: Progressing  Flowsheets (Taken 2/6/2023 0745)  Achieves optimal ventilation and oxygenation:   Assess for changes in respiratory status   Assess for changes in mentation and behavior   Position to facilitate oxygenation and minimize respiratory effort   Oxygen supplementation based on oxygen saturation or arterial blood gases     Problem: Cardiovascular - Adult  Goal: Maintains optimal cardiac output and hemodynamic stability  Outcome: Progressing  Flowsheets (Taken 2/6/2023 0745)  Maintains optimal cardiac output and hemodynamic stability:   Monitor blood pressure and heart rate   Monitor urine output and notify Licensed Independent Practitioner for values outside of normal range   Assess for signs of decreased cardiac output   Administer fluid and/or volume expanders as ordered  Goal: Absence of cardiac dysrhythmias or at baseline  Outcome: Progressing  Flowsheets (Taken 2/6/2023 0745)  Absence of cardiac dysrhythmias or at baseline: Monitor cardiac rate and rhythm     Problem: Genitourinary - Adult  Goal: Absence of urinary retention  Outcome: Progressing  Flowsheets (Taken 2/6/2023 0745)  Absence of urinary retention:   Assess patients ability to void and empty bladder   Monitor intake/output and perform bladder scan as needed  Goal: Urinary catheter remains patent  Outcome: Progressing  Flowsheets (Taken 2/6/2023 0745)  Urinary catheter remains patent: Assess patency of urinary catheter     Problem: Chronic Conditions and Co-morbidities  Goal: Patient's chronic conditions and co-morbidity symptoms are monitored and maintained or improved  Outcome: Progressing  Flowsheets (Taken 2/6/2023 0704)  Care Plan - Patient's Chronic Conditions and Co-Morbidity Symptoms are Monitored and Maintained or Improved: Monitor and assess patient's chronic conditions and comorbid symptoms for stability, deterioration, or improvement     Problem: Skin/Tissue Integrity  Goal: Absence of new skin breakdown  Description: 1. Monitor for areas of redness and/or skin breakdown  2. Assess vascular access sites hourly  3. Every 4-6 hours minimum:  Change oxygen saturation probe site  4. Every 4-6 hours:  If on nasal continuous positive airway pressure, respiratory therapy assess nares and determine need for appliance change or resting period.   Outcome: Progressing     Problem: Safety - Adult  Goal: Free from fall injury  Outcome: Progressing  Flowsheets (Taken 2/6/2023 1215)  Free From Fall Injury: Instruct family/caregiver on patient safety     Problem: ABCDS Injury Assessment  Goal: Absence of physical injury  Outcome: Progressing  Flowsheets (Taken 2/6/2023 1215)  Absence of Physical Injury: Implement safety measures based on patient assessment     Problem: Pain  Goal: Verbalizes/displays adequate comfort level or baseline comfort level  Outcome: Progressing  Flowsheets (Taken 2/6/2023 0739)  Verbalizes/displays adequate comfort level or baseline comfort level:   Encourage patient to monitor pain and request assistance   Assess pain using appropriate pain scale   Administer analgesics based on type and severity of pain and evaluate response   Implement non-pharmacological measures as appropriate and evaluate response     Problem: Nutrition Deficit:  Goal: Optimize nutritional status  Outcome: Progressing

## 2023-02-06 NOTE — PROGRESS NOTES
ABG drawn x 1 from Left Radial. Patient had NormalAllen's Test.  Patient was on 7.5 liters/min via  HFNC   at time of puncture. Pressure held for 5. No bleeding or bruising noted at puncture site.   Patient tolerated procedure well      Performed by Raúl Castellanos RCP

## 2023-02-06 NOTE — PROGRESS NOTES
Physical Therapy  Physical Therapy Treatment Note/Plan of Care    Room #:  8547/7185-99  Patient Name: Beatriz Carpenter  YOB: 1950  MRN: [id number]    Date of Service: 2/6/2023     Tentative placement recommendation: Subacute Rehab  Equipment recommendation:  To be determined      Evaluating Physical Therapist: Branden Kaplan PT, DPT #[id number]      Specific Provider Orders/Date/Referring Provider :     01/19/23 0745    PT eval and treat  Start:  01/19/23 0745,   End:  01/19/23 0745,   ONE TIME,   Standing Count:  1 Occurrences,   Vandana Hoffman MD Acknowledge New     Admitting Diagnosis:   NSTEMI (non-ST elevated myocardial infarction) [facility]) [I21.4]  Atrial fibrillation with rapid ventricular response (HCC) [I48.91]  Recurrent right pleural effusion [J90]  Hypotension, unspecified hypotension type [I95.9]  Acute on chronic respiratory failure with hypoxia and hypercapnia (HCC) [J96.21, J96.22]      Surgery: none  Visit Diagnoses         Codes    Recurrent right pleural effusion     J90    NSTEMI (non-ST elevated myocardial infarction) ([facility] [address]     I21.4    Postprocedural pneumothorax     J95.811            Patient Active Problem List   Diagnosis    Depression with anxiety    Osteoporosis    Asthma    Hyperlipidemia    Mitral regurgitation    Obstructive sleep apnea syndrome    Psoriasis    Diabetes mellitus ([facility] [address]    Parkinson's disease ([address]    Primary hypertension    Microalbuminuria    Morbid obesity (HCC)    RLS (restless legs syndrome)    Generalized weakness    Inability to walk    Hypothyroidism    Chest pain    Acute asthma exacerbation    Asthma exacerbation, mild    Paroxysmal atrial fibrillation (HCC)    Atrial fibrillation with rapid ventricular response (HCC)    Acute on chronic congestive heart failure ([address]    Coronary artery disease involving native coronary artery of native heart without angina pectoris    Dysphagia    Hepatosplenomegaly    Acute decompensated heart failure ([facility] [address]    Acute diastolic (congestive) heart failure (HCC)    Nonrheumatic tricuspid valve regurgitation    Tobacco abuse    Recurrent syncope    Septic shock (HCC)    Seizure-like activity (HCC)    Acute kidney injury ([facility] [facility]    Pancytopenia (HCC)    Thrombocytopenia (HCC)    Encephalopathy    Acute respiratory failure with hypoxia (HCC)    Lactic acidosis    Delirium    Acute on chronic anemia    Pleural effusion, right    Acute respiratory failure with hypoxia and hypercapnia (HCC)    Acute confusion    Acute on chronic diastolic heart failure (HCC)    Macrocytosis    Other specified anemias    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[facility] [address]    Puerperal sepsis with acute hypoxic respiratory failure without septic shock (HCC)    Pulmonary HTN (HCC)    Chronic anticoagulation    RVF (right ventricular failure) (HCC)    Metabolic alkalosis    Sepsis (HCC)    COPD exacerbation (HCC)    Acute on chronic respiratory failure with hypercapnia (HCC)    Persistent atrial fibrillation (HCC)    Hypercapnic respiratory failure (HCC)    Acute on chronic respiratory failure (HCC)    Hyperkalemia    Heart failure (HCC)    Acute renal insufficiency    Hypotension    Anemia    Acute on chronic respiratory failure with hypoxia and hypercapnia (HCC)    Palliative care encounter        ASSESSMENT of Current Deficits Patient exhibits decreased strength, balance, and endurance impairing functional mobility, transfers, gait , gait distance, and tolerance to activity. Patient declined standing with max encouragement but was agreeable to sitting EOB and performing seated exercises.        PHYSICAL THERAPY  PLAN OF CARE       Physical therapy plan of care is established based on physician order,  patient diagnosis and clinical assessment    Current Treatment Recommendations:    -Bed Mobility: Lower extremity exercises  and Trunk control activities   -Sitting Balance: Incorporate reaching activities to activate trunk muscles , Hands on support to maintain midline , Facilitate active trunk muscle engagement , Facilitate postural control in all planes , and Engage in core activities to allow for movement within base of support   -Standing Balance: Perform strengthening exercises in standing to promote motor control with or without upper extremity support , Instruct patient on adequate base of support to maintain balance, and Challenge balance utilizing reaching  activities beyond center of gravity    -Transfers: Provide instruction on proper hand and foot position for adequate transfer of weight onto lower extremities and use of gait device if needed, Cues for hand placement, technique and safety. Provide stabilization to prevent fall , Facilitate weight shift forward on to lower extremities and provide necessary stabilization of bilateral lower extremities , Support transfer of weight on to lower extremities, and Assist with extension of knees trunk and hip to accept weight transfer   -Gait: Gait training, Standing activities to improve: base of support, weight shift, weight bearing , Exercises to improve trunk control, Exercises to improve hip and knee control, Performance of protected weight bearing activities, and Activities to increase weight bearing   -Endurance: Utilize Supervised activities to increase level of endurance to allow for safe functional mobility including transfers and gait  and Use graduated activities to promote good breathing techniques and provide support and education to maximize respiratory function    PT long term treatment goals are located in below grid    Patient and or family understand(s) diagnosis, prognosis, and plan of care. Frequency of treatments: Patient will be seen  3-5 times/week.          Prior Level of Function: Patient ambulated with wheeled walker for short distances  Rehab Potential: fair + for baseline    Past medical history:   Past Medical History:   Diagnosis Date    A-fib ([facility] [facility]     Acute on chronic congestive heart failure (HCC)     Anxiety     Asthma     CAD (coronary artery disease) 01/21/2016    Cancer ([facility] [address]  breast ca 2006    right lumpectomy    Chronic kidney disease     nephrolithiasis    COPD exacerbation ([facility] [address] 10/12/2022    Depression     Diabetes mellitus ([facility] [address]     H/O mammogram     Hx MRSA infection     toe infection january 2012    Hyperlipidemia     Hypertension     Lateral epicondylitis     Morbid obesity (HCC)     SERENA on CPAP     Oxygen dependent     Parkinson's disease ([facility] [address]     Tubal ligation status      Past Surgical History:   Procedure Laterality Date    BREAST LUMPECTOMY      BREAST REDUCTION SURGERY      CARDIAC CATHETERIZATION  4/28/2014    Dr. Rodney Hawkins  01/11/2022    Dr. Karmen Florentino  7/29/15    CT GUIDED CHEST TUBE  7/8/2022    CT GUIDED CHEST TUBE 7/8/2022 Vera Calero MD [facility]    ENDOSCOPY, COLON, DIAGNOSTIC  7/19/15    GALLBLADDER SURGERY      LUMBAR LAMINECTOMY      TOE AMPUTATION      TONSILLECTOMY      UPPER GASTROINTESTINAL ENDOSCOPY      UPPER GASTROINTESTINAL ENDOSCOPY N/A 7/19/2022    EGD ESOPHAGOGASTRODUODENOSCOPY performed by Tiffany Gardner MD at [address]:    Precautions:  Up with assistance, falls, O2, and morbid obesity      Social history: Patient from SNF    Equipment owned: Wheelchair, Wheeled Walker, and O2    AM-PAC Basic Mobility       AM-PAC Basic Mobility - Inpatient   How much help is needed turning from your back to your side while in a flat bed without using bedrails?: A Lot  How much help is needed moving from lying on your back to sitting on the side of a flat bed without using bedrails?: A Lot  How much help is needed moving to and from a bed to a chair?: Total  How much help is needed standing up from a chair using your arms?: Total  How much help is needed walking in hospital room?: Total  How much help is needed climbing 3-5 steps with a railing?: Total  AM-PAC Inpatient Mobility Raw Score : 8  AM-PAC Inpatient T-Scale Score : 28.52  Mobility Inpatient CMS 0-100% Score: 86.62  Mobility Inpatient CMS G-Code Modifier : CM    Nursing cleared patient for PT treatment. Patient c/o being tired. OBJECTIVE;   Initial Evaluation  Date: 1/19/2023 Treatment Date:  2/6/2023       Short Term/ Long Term   Goals   Was pt agreeable to Eval/treatment? Yes yes To be met in 3 days   Pain level   0/10   0/10    Bed Mobility    Rolling: Maximal assist of 1    Supine to sit: Maximal assist of 1    Sit to supine: Maximal assist of 1    Scooting: Maximal assist of 1   Rolling: Moderate assist of 1   Supine to sit: Moderate assist of 1   Sit to supine: Moderate assist of 1   Scooting: Maximal assist of  2    Rolling: Minimal assist of 1    Supine to sit: Minimal assist of 1    Sit to supine: Minimal assist of 1    Scooting: Minimal assist of 1     Transfers Sit to stand: Not assessed  d/t fair- seated balance and pt declining standing Sit to stand: Not assessed , pt declined with encouragement      Sit to stand:  Moderate assist of 1    Ambulation    not assessed  not assessed    > 15 feet using  wheeled walker with Moderate assist of 1    Stair negotiation: ascended and descended   Not assessed           ROM Within functional limits    Increase range of motion 10% of affected joints    Strength BUE:  refer to OT eval  RLE:  4-/5  LLE:  4-/5  Increase strength in affected mm groups by 1/3 grade   Balance Sitting EOB:  fair -  Dynamic Standing:  not assessed  Sitting EOB: fair   Dynamic Standing: not assessed    Sitting EOB:  fair   Dynamic Standing: fair - with [employer]     Patient is Alert & Oriented x person, place, time, and situation and follows directions    Sensation:  Patient  denies numbness/tingling   Edema:  no   Endurance: poor+    Vitals:  8 liters nasal cannula   Blood Pressure at rest  Blood Pressure during session    Heart Rate at rest  Heart Rate during session    SPO2 at rest %  SPO2 during session 84-95%     Patient education  Patient educated on role of Physical Therapy, risks of immobility, safety and plan of care, energy conservation,  importance of mobility while in hospital , ankle pumps, quad set and glut set for edema control, blood clot prevention, safety , and positioning for skin integrity and comfort     Patient response to education:   Pt verbalized understanding Pt demonstrated skill Pt requires further education in this area   Yes Partial Yes      Treatment:  Patient practiced and was instructed/facilitated in the following treatment: Patient performed supine BLE & BUE exercises. Therapeutic Exercises:  ankle pumps, heel raises, hip abduction/adduction, and long arc quad 15 - 20 reps      At end of session, patient in bed with     call light and phone within reach,  all lines and tubes intact, nursing notified. Patient would benefit from continued skilled Physical Therapy to improve functional independence and quality of life.          Patient's/ family goals   rehab    Time in  904  Time out  927    Total Treatment Time  23 minutes      CPT codes:  Therapeutic activities (01749)   8 minutes  1 unit(s)  Therapeutic exercises (51490)   15 minutes  1 unit(s)    Jose Vaughan PT License Number [id number]

## 2023-02-06 NOTE — PROGRESS NOTES
[facility] MD Kamryn Oquendo MD Cecil Romance, MD Signa Judge, BRADLEY Devlin, NUNU Piña CNP  Progress Note    2/6/2023    SUBJECTIVE:   1/20: Feeling little better today. Denies new complaint. Still tachypnea, though denies dyspnea no cp/palp Appetite ok ROS otherwise unremarkable    1//21 seen in her room on o2/nc bp stable weak poor po intake   2+ edema in LE     1/22 charts reviewed . On bipap    1/23 : on o2/nc . Still look hypervolemic     1/24 seen in her room comfortable o2/nc . Still with LE edema which seems to be multifactorial and will likely need to accept having some edema on board     1/25 sitting on edge of the bed working with pt . Still with considerable edema in LEs   BP stable     1/26 seen in her room reports of SOB with minimal activity , LE edema still signficant . 1/27 good UO On 5 L/o2/nc  Still with sob with activity Tachycardiac with low functional capacity     1/28: Asleep, resting and breathing comfortably on nasal cannula. Remains edematous. Ongoing fatigue and generalized weakness. 1/29: Hypotensive last evening, Bumex drip stopped, IV normal saline bolus administered to effect. Breathing little more easily though still on AVAPS. To be downgraded to CPAP shortly. Thirsty. Still markedly edematous. 1/30: Somnolent, though arousable. On CPAP. Ongoing fatigue and malaise with generalized weakness    1/31 seen in her room , skin wrinkling in LE on HFNC . BP on lower side  For thoracentesis today   Dw RN .     2/1: Seen while laying in bed, no acute distress. Tells me that she is thirsty. She is on heated high flow nasal canula. Feels that her breathing has improved slightly. Plan is for thoracentesis later this afternoon, could not be done yesterday due to elevated INR. Still with skin wrinkling to bilateral lower extremities.      2/2 1 L removed with thoracentesis   O2 requirement better on 8 L o2/nc Very weak and deconditioned   Labile BP numbers    2/2 o2/nc LE UO ok remain weak decondtioned . Edema in LE getting somewhat worse    2/4 remain with significant depdent edema deconditioned in bed     2/5: Discussed with respiratory therapist: Just put on her CPAP after having tolerated nasal cannula throughout most of the day. Remains bedbound, severely weak and debilitated. Remains massively swollen, little change in the last 5 days. 2/6: Status quo. Somnolent but arousable. Desats and dyspneic without AVAPS. Remains massively edematous    PROBLEM LIST:    Principal Problem:    Acute on chronic respiratory failure with hypoxia and hypercapnia (HCC)  Active Problems:    Palliative care encounter  Resolved Problems:    * No resolved hospital problems. *         DIET:    ADULT ORAL NUTRITION SUPPLEMENT; Dinner; Other Oral Supplement; Gelatein 20  ADULT DIET; Regular; 3 carb choices (45 gm/meal); Low Fat/Low Chol/High Fiber/2 gm Na;  Moderately Thick (Honey); 1200 ml     MEDS (scheduled):    potassium chloride  40 mEq Oral Once    digoxin  125 mcg IntraVENous Daily    metoprolol succinate  50 mg Oral BID    acetaZOLAMIDE (DIAMOX) IVPB  500 mg IntraVENous Q12H    [Held by provider] bumetanide  2 mg IntraVENous BID    [Held by provider] dilTIAZem  60 mg Oral 3 times per day    sucralfate  1 g Oral 4x Daily    rOPINIRole  1 mg Oral TID    atorvastatin  20 mg Oral Nightly    ipratropium-albuterol  1 vial Inhalation 4x Daily    [Held by provider] insulin glargine  13 Units SubCUTAneous BID    [Held by provider] LORazepam  0.5 mg Oral Nightly    insulin lispro  0-4 Units SubCUTAneous TID     insulin lispro  0-4 Units SubCUTAneous Nightly    budesonide  0.5 mg Nebulization BID    sodium chloride flush  10 mL IntraVENous 2 times per day    miconazole   Topical BID    arformoterol tartrate  15 mcg Nebulization BID    pantoprazole  40 mg Oral QAM AC    apixaban  5 mg Oral BID    mirtazapine  7.5 mg Oral Nightly carbidopa-levodopa  1 tablet Oral TID    sertraline  50 mg Oral Daily    montelukast  10 mg Oral Nightly       MEDS (infusions):   sodium chloride 25 mL/hr at 02/04/23 0222    dextrose         MEDS (prn):  sodium chloride, loperamide, sodium chloride flush, sodium chloride, promethazine **OR** ondansetron, polyethylene glycol, acetaminophen **OR** acetaminophen, glucose, dextrose bolus **OR** dextrose bolus, glucagon (rDNA), dextrose    PHYSICAL EXAM:     Patient Vitals for the past 24 hrs:   BP Temp Temp src Pulse Resp SpO2   02/06/23 1521 -- -- -- -- 22 --   02/06/23 1400 (!) 100/59 97.3 °F (36.3 °C) Axillary 83 18 96 %   02/06/23 1004 -- -- -- -- 23 --   02/06/23 0745 109/73 97.6 °F (36.4 °C) Oral 90 20 97 %   02/06/23 0635 -- -- -- -- 24 --   02/06/23 0404 -- -- -- -- 23 --   02/05/23 2345 -- -- -- -- 25 --   02/05/23 2234 (!) 90/53 97.5 °F (36.4 °C) Oral 88 20 98 %   02/05/23 1835 -- -- -- -- 21 --   02/05/23 1833 -- -- -- -- -- 98 %   @      Intake/Output Summary (Last 24 hours) at 2/6/2023 1640  Last data filed at 2/6/2023 1550  Gross per 24 hour   Intake 500 ml   Output 350 ml   Net 150 ml         Wt Readings from Last 3 Encounters:   02/05/23 268 lb 12.8 oz (121.9 kg)   01/16/23 259 lb (117.5 kg)   01/16/23 259 lb (117.5 kg)     Age-appropriate morbidly obese white woman, though easily arousable, no apparent distress  NC/AT EOMI sclera conjunctive are clear and anicteric mucous membranes are moist  Neck soft supple trachea midline  Lungs clear to ausculation bilaterally, diminished in the bases. Poor inspiratory effort. Regular rhythm normal S1 and S2  Abdomen soft nontender nondistended normal active bowel sounds.   Abdominal pannus has substantial edema  Distal extremities, thighs, sacrum have substantial chronic type nonpitting edema, +1 BLE edema, though with skin wrinkling   Pulses 1+ x4  Moves all 4 extremities  Cranial nerves II through XII grossly intact  Awake, alert, interactive and appropriate  Skin tone consistent with chronic venous stasis distally      DATA:    Recent Labs     02/06/23  0648   WBC 6.1   HGB 7.8*   HCT 27.5*   MCV 88.1            Recent Labs     02/04/23  0726 02/05/23  1759 02/06/23  0647    140 141   K 3.4* 4.3 3.2*   CL 95* 94* 94*   CO2 41* 37* 42*   MG  --   --  1.5*   PHOS  --   --  3.6   BUN 49* 46* 44*   CREATININE 2.3* 2.3* 2.3*   ALT  --  <5 <5   AST  --  8 7   BILITOT  --  0.3 0.4   ALKPHOS  --  61 60       Lab Results   Component Value Date    LABPROT 0.5 (H) 01/18/2023    LABPROT 0.5 01/18/2023     On CT no hydro/signs of obstruction     Urine studies  U Na 21, U Cl 23 U prot:cr ratio 0.5    ASSESSMENT / RECOMMENDATIONS:       Assessment  1. Acute kidney injury urine lytes support decrease effective volume     2. CKD stage III, Baseline creatinine 1.4 to 1.7 mg/dL, secondary to diabetic nephropathy and renal microvascular atherosclerotic disease. 0.5 g proteinuria     3. Anemia due to CKD, phlebotomy associated prolonged hospitalization     4. Acute hypercapnic and hypoxic respiratory failure due to COPD exacerbation and pneumonia. Does not appear hypervolemic. 5.  Morbid obesity, BMI 50.6 kg per metered square     6. Edema/anasarca, chronic, multifactorial, due to venous insufficiency in the setting of morbid obesity, relative immobility, likely diastolic dysfunction though it was \"indeterminate\" on echo, the does have pulmonary hypertension, mild right-sided heart failure    7. Hypotension    8. Elevated bicarbonate represents compensation for chronic respiratory acidosis, as well as likely contraction alkalosis due to diuresis on the Bumex drip       Creatinine stable   1 L removed with thoracentesis     Remains massively hypervolemic with little improvement over the past week with Bumex drip, metolazone/Diuril, Diamox.   I discussed with her using dialysis machine to ultrafiltrate --would involve placement of tunneled dialysis line, followed by daily ultrafiltration treatments of at least 4 hours, longer if we have the manpower, in effort to unload what is conservatively 25 to 30 L of extravascular water. She did not offer a definitive answer, and asked that we defer her making a decision at least until tomorrow. Ongoing tachycardia and hypotension    Recommendations  Continue diamox   Replace k   PT/OT   Nutrition support   I recommend we use ultrafiltration by dialysis line with the dialysis machine unload her massive hypervolemia which we have been unable to touch therapy to date    Overall prognosis poor with her being so deconditioned and weak     -Discussed again with her today regarding starting dialysis/ultrafiltration for fluid clearance.   She had agreed until discussed line placement, and then she balked,  and said she could not decide and will think about it again --> will address again tomorrow  -Resume Bumex drip    Electronically signed by Radha Rothman MD on 2/6/2023

## 2023-02-06 NOTE — CARE COORDINATION
SS NOTE: COVID NEGATIVE 1/17, PT WILL NEED A RAPID NEGATIVE COVID TEST IF SHE RETURNS TO [facility]. [facility] recd request for [address] for a [address] to be completed today by 3PM with their Physician- [phone] OPTION 3- THEY WILL NEED HER FULL NAME, BIRTHDATE AND MEMBER ID # TO COMPLETE THE PEER TO PEER. KRISHNA called Dr Maddi Mg and advised him of the need to complete this process by 3 PM today. Pt is on 7 liters of O2 and per nursing is most comfortable on continuous bipap. According to liaison from [address] [place]. KRISHNA discussed with pt and she agrees with going to [facility], [facility]. Referral completed to Liaison from [facility] - they are investigated insurance for coverage and began Jes[facility]s Matta for the [facility]. KRISHNA discussed with pt's dtr Lilliana yesterday and she too is in agreement. SS to continue Andrew Frazier.  ESTHER Cooley.2/6/2023.11:18PM.

## 2023-02-06 NOTE — PROGRESS NOTES
[facility] MD Meaghan James, MD Vladimir Gamble, MD Orin Conroy, CNP   Joyce Devlin, ANP  Skip Archuleta, BRADLEY  Progress Note    2/5/2023    SUBJECTIVE:   1/20: Feeling little better today. Denies new complaint. Still tachypnea, though denies dyspnea no cp/palp Appetite ok ROS otherwise unremarkable    1//21 seen in her room on o2/nc bp stable weak poor po intake   2+ edema in LE     1/22 charts reviewed . On bipap    1/23 : on o2/nc . Still look hypervolemic     1/24 seen in her room comfortable o2/nc . Still with LE edema which seems to be multifactorial and will likely need to accept having some edema on board     1/25 sitting on edge of the bed working with pt . Still with considerable edema in LEs   BP stable     1/26 seen in her room reports of SOB with minimal activity , LE edema still signficant . 1/27 good UO On 5 L/o2/nc  Still with sob with activity Tachycardiac with low functional capacity     1/28: Asleep, resting and breathing comfortably on nasal cannula. Remains edematous. Ongoing fatigue and generalized weakness. 1/29: Hypotensive last evening, Bumex drip stopped, IV normal saline bolus administered to effect. Breathing little more easily though still on AVAPS. To be downgraded to CPAP shortly. Thirsty. Still markedly edematous. 1/30: Somnolent, though arousable. On CPAP. Ongoing fatigue and malaise with generalized weakness    1/31 seen in her room , skin wrinkling in LE on HFNC . BP on lower side  For thoracentesis today   Papi RN .     2/1: Seen while laying in bed, no acute distress. Tells me that she is thirsty. She is on heated high flow nasal canula. Feels that her breathing has improved slightly. Plan is for thoracentesis later this afternoon, could not be done yesterday due to elevated INR. Still with skin wrinkling to bilateral lower extremities.      2/2 1 L removed with thoracentesis   O2 requirement better on 8 L o2/nc Very weak and deconditioned   Labile BP numbers    2/2 o2/nc LE UO ok remain weak decondtioned . Edema in LE getting somewhat worse    2/4 remain with significant depdent edema deconditioned in bed     2/5: Discussed with respiratory therapist: Just put on her CPAP after having tolerated nasal cannula throughout most of the day. Remains bedbound, severely weak and debilitated. Remains massively swollen, little change in the last 5 days. PROBLEM LIST:    Principal Problem:    Acute on chronic respiratory failure with hypoxia and hypercapnia (HCC)  Active Problems:    Palliative care encounter  Resolved Problems:    * No resolved hospital problems. *         DIET:    ADULT ORAL NUTRITION SUPPLEMENT; Dinner; Other Oral Supplement; Gelatein 20  ADULT DIET; Regular; 3 carb choices (45 gm/meal); Low Fat/Low Chol/High Fiber/2 gm Na;  Moderately Thick (Honey); 1200 ml     MEDS (scheduled):    digoxin  125 mcg IntraVENous Daily    metoprolol succinate  50 mg Oral BID    acetaZOLAMIDE (DIAMOX) IVPB  500 mg IntraVENous Q12H    [Held by provider] bumetanide  2 mg IntraVENous BID    [Held by provider] dilTIAZem  60 mg Oral 3 times per day    sucralfate  1 g Oral 4x Daily    rOPINIRole  1 mg Oral TID    atorvastatin  20 mg Oral Nightly    ipratropium-albuterol  1 vial Inhalation 4x Daily    [Held by provider] insulin glargine  13 Units SubCUTAneous BID    [Held by provider] LORazepam  0.5 mg Oral Nightly    insulin lispro  0-4 Units SubCUTAneous TID WC    insulin lispro  0-4 Units SubCUTAneous Nightly    budesonide  0.5 mg Nebulization BID    sodium chloride flush  10 mL IntraVENous 2 times per day    miconazole   Topical BID    arformoterol tartrate  15 mcg Nebulization BID    pantoprazole  40 mg Oral QAM AC    apixaban  5 mg Oral BID    mirtazapine  7.5 mg Oral Nightly    carbidopa-levodopa  1 tablet Oral TID    sertraline  50 mg Oral Daily    montelukast  10 mg Oral Nightly       MEDS (infusions):   sodium chloride 25 mL/hr at 02/04/23 0222    dextrose         MEDS (prn):  sodium chloride, loperamide, sodium chloride flush, sodium chloride, promethazine **OR** ondansetron, polyethylene glycol, acetaminophen **OR** acetaminophen, glucose, dextrose bolus **OR** dextrose bolus, glucagon (rDNA), dextrose    PHYSICAL EXAM:     Patient Vitals for the past 24 hrs:   BP Temp Temp src Pulse Resp SpO2 Weight   02/05/23 1835 -- -- -- -- 21 -- --   02/05/23 1833 -- -- -- -- -- 98 % --   02/05/23 1342 -- -- -- -- 20 -- --   02/05/23 1000 -- -- -- -- 20 -- --   02/05/23 0909 -- -- -- -- -- -- 268 lb 12.8 oz (121.9 kg)   02/05/23 0833 (!) 116/57 -- -- 99 -- -- --   02/05/23 0828 (!) 116/57 -- -- 99 -- -- --   02/05/23 0800 (!) 116/57 97.5 °F (36.4 °C) Oral -- -- -- --   02/05/23 0734 (!) 116/57 -- Oral 99 16 -- --   02/05/23 0732 -- -- -- -- -- 100 % --   02/05/23 0337 -- -- -- -- 20 -- --   02/05/23 0033 -- -- -- -- 21 -- --   02/05/23 0029 122/72 -- -- (!) 158 -- -- --   02/04/23 2008 (!) 127/59 97.9 °F (36.6 °C) Axillary (!) 129 20 100 % --   @      Intake/Output Summary (Last 24 hours) at 2/5/2023 1916  Last data filed at 2/5/2023 1439  Gross per 24 hour   Intake 160 ml   Output 700 ml   Net -540 ml         Wt Readings from Last 3 Encounters:   02/05/23 268 lb 12.8 oz (121.9 kg)   01/16/23 259 lb (117.5 kg)   01/16/23 259 lb (117.5 kg)     Age-appropriate morbidly obese white woman, though easily arousable, no apparent distress  NC/AT EOMI sclera conjunctive are clear and anicteric mucous membranes are moist  Neck soft supple trachea midline  Lungs clear to ausculation bilaterally, diminished in the bases. Poor inspiratory effort. Regular rhythm normal S1 and S2  Abdomen soft nontender nondistended normal active bowel sounds.   Abdominal pannus has substantial edema  Distal extremities, thighs, sacrum have substantial chronic type nonpitting edema, +1 BLE edema, though with skin wrinkling   Pulses 1+ x4  Moves all 4 extremities  Cranial nerves II through XII grossly intact  Awake, alert, interactive and appropriate  Skin tone consistent with chronic venous stasis distally      DATA:    No results for input(s): WBC, HGB, HCT, MCV, PLT in the last 72 hours. Recent Labs     02/03/23  0525 02/04/23  0726 02/05/23  1759    142 140   K 3.7 3.4* 4.3   CL 96* 95* 94*   CO2 40* 41* 37*   BUN 49* 49* 46*   CREATININE 2.1* 2.3* 2.3*   ALT  --   --  <5   AST  --   --  8   BILITOT  --   --  0.3   ALKPHOS  --   --  61       Lab Results   Component Value Date    LABPROT 0.5 (H) 01/18/2023    LABPROT 0.5 01/18/2023     On CT no hydro/signs of obstruction     Urine studies  U Na 21, U Cl 23 U prot:cr ratio 0.5    ASSESSMENT / RECOMMENDATIONS:       Assessment  1. Acute kidney injury urine lytes support decrease effective volume     2. CKD stage III, Baseline creatinine 1.4 to 1.7 mg/dL, secondary to diabetic nephropathy and renal microvascular atherosclerotic disease. 0.5 g proteinuria     3. Anemia due to CKD, phlebotomy associated prolonged hospitalization     4. Acute hypercapnic and hypoxic respiratory failure due to COPD exacerbation and pneumonia. Does not appear hypervolemic. 5.  Morbid obesity, BMI 50.6 kg per metered square     6. Edema/anasarca, chronic, multifactorial, due to venous insufficiency in the setting of morbid obesity, relative immobility, likely diastolic dysfunction though it was \"indeterminate\" on echo, the does have pulmonary hypertension, mild right-sided heart failure    7. Hypotension    8. Elevated bicarbonate represents compensation for chronic respiratory acidosis, as well as likely contraction alkalosis due to diuresis on the Bumex drip       Creatinine stable   1 L removed with thoracentesis     Remains massively hypervolemic with little improvement over the past week with Bumex drip, metolazone/Diuril, Diamox.   I discussed with her using dialysis machine to ultrafiltrate --would involve placement of tunneled dialysis line, followed by daily ultrafiltration treatments of at least 4 hours, longer if we have the manpower, in effort to unload what is conservatively 25 to 30 L of extravascular water. She did not offer a definitive answer, and asked that we defer her making a decision at least until tomorrow.     Ongoing tachycardia and hypotension    Recommendations  Continue to hold Bumex for now  Continue diamox   Replace k as necessary  PT/OT   Nutrition support   I recommend we use ultrafiltration by dialysis line with the dialysis machine unload her massive hypervolemia which we have been unable to touch therapy to date    Overall prognosis poor with her being so deconditioned and weak     Electronically signed by Silvestre Almonte MD on 2/5/2023

## 2023-02-06 NOTE — PROGRESS NOTES
Department of Internal Medicine  General Internal Medicine  Attending Progress Note  Chief Complaint   Patient presents with    Respiratory Distress     Normally on 3L NC, came in on CPAP, given total of 50mcg push dose epi for low BP by EMS     SUBJECTIVE:    Patient appears sleepy today. Denied fever or chills. No nausea or vomiting. Seems mildly confusion.      OBJECTIVE      Medications    Current Facility-Administered Medications: digoxin (LANOXIN) injection 125 mcg, 125 mcg, IntraVENous, Daily  metoprolol succinate (TOPROL XL) extended release tablet 50 mg, 50 mg, Oral, BID  acetaZOLAMIDE (DIAMOX) 500 mg in sodium chloride 0.9 % 100 mL IVPB, 500 mg, IntraVENous, Q12H  [Held by provider] bumetanide (BUMEX) injection 2 mg, 2 mg, IntraVENous, BID  sodium chloride (OCEAN, BABY AYR) 0.65 % nasal spray 1 spray, 1 spray, Each Nostril, PRN  [Held by provider] dilTIAZem (CARDIZEM) tablet 60 mg, 60 mg, Oral, 3 times per day  sucralfate (CARAFATE) tablet 1 g, 1 g, Oral, 4x Daily  rOPINIRole (REQUIP) tablet 1 mg, 1 mg, Oral, TID  atorvastatin (LIPITOR) tablet 20 mg, 20 mg, Oral, Nightly  ipratropium-albuterol (DUONEB) nebulizer solution 3 mL, 1 vial, Inhalation, 4x Daily  [Held by provider] insulin glargine (LANTUS) injection vial 13 Units, 13 Units, SubCUTAneous, BID  loperamide (IMODIUM) capsule 2 mg, 2 mg, Oral, 4x Daily PRN  [Held by provider] LORazepam (ATIVAN) tablet 0.5 mg, 0.5 mg, Oral, Nightly  insulin lispro (HUMALOG) injection vial 0-4 Units, 0-4 Units, SubCUTAneous, TID WC  insulin lispro (HUMALOG) injection vial 0-4 Units, 0-4 Units, SubCUTAneous, Nightly  budesonide (PULMICORT) nebulizer suspension 500 mcg, 0.5 mg, Nebulization, BID  sodium chloride flush 0.9 % injection 10 mL, 10 mL, IntraVENous, 2 times per day  sodium chloride flush 0.9 % injection 10 mL, 10 mL, IntraVENous, PRN  0.9 % sodium chloride infusion, , IntraVENous, PRN  promethazine (PHENERGAN) tablet 12.5 mg, 12.5 mg, Oral, Q6H PRN **OR** ondansetron (ZOFRAN) injection 4 mg, 4 mg, IntraVENous, Q6H PRN  polyethylene glycol (GLYCOLAX) packet 17 g, 17 g, Oral, Daily PRN  acetaminophen (TYLENOL) tablet 650 mg, 650 mg, Oral, Q6H PRN **OR** acetaminophen (TYLENOL) suppository 650 mg, 650 mg, Rectal, Q6H PRN  miconazole (MICOTIN) 2 % powder, , Topical, BID  arformoterol tartrate (BROVANA) nebulizer solution 15 mcg, 15 mcg, Nebulization, BID  pantoprazole (PROTONIX) tablet 40 mg, 40 mg, Oral, QAM AC  glucose chewable tablet 16 g, 4 tablet, Oral, PRN  dextrose bolus 10% 125 mL, 125 mL, IntraVENous, PRN **OR** dextrose bolus 10% 250 mL, 250 mL, IntraVENous, PRN  glucagon (rDNA) injection 1 mg, 1 mg, SubCUTAneous, PRN  dextrose 10 % infusion, , IntraVENous, Continuous PRN  apixaban (ELIQUIS) tablet 5 mg, 5 mg, Oral, BID  mirtazapine (REMERON) tablet 7.5 mg, 7.5 mg, Oral, Nightly  carbidopa-levodopa (SINEMET CR)  MG per extended release tablet 1 tablet, 1 tablet, Oral, TID  sertraline (ZOLOFT) tablet 50 mg, 50 mg, Oral, Daily  montelukast (SINGULAIR) tablet 10 mg, 10 mg, Oral, Nightly  Physical    VITALS:  /73   Pulse 90   Temp 97.6 °F (36.4 °C) (Oral)   Resp 23   Ht 5' (1.524 m)   Wt 268 lb 12.8 oz (121.9 kg)   SpO2 97%   BMI 52.50 kg/m²   CONSTITUTIONAL:  lethargic, cooperative, and no apparent distress  EYES:  extra-ocular muscles intact  ENT:  normocepalic, without obvious abnormality  NECK:  supple, symmetrical, trachea midline  LUNGS:  no increased work of breathing, no retractions, and diminished breath sounds throughout lungs  CARDIOVASCULAR:  normal apical pulses and normal S1 and S2  ABDOMEN:  normal bowel sounds, non-distended, and non-tender, parasacral edema  MUSCULOSKELETAL:  improved Edema  NEUROLOGIC:  Mental Status Exam:  Level of Alertness:   awake  Orientation:   person, place, time  SKIN:  no bruising or bleeding and normal skin color, texture, turgor  Data    CBC:   Lab Results   Component Value Date/Time    WBC 6.1 02/06/2023 06:48 AM    RBC 3.12 02/06/2023 06:48 AM    HGB 7.8 02/06/2023 06:48 AM    HCT 27.5 02/06/2023 06:48 AM    MCV 88.1 02/06/2023 06:48 AM    MCH 25.0 02/06/2023 06:48 AM    MCHC 28.4 02/06/2023 06:48 AM    RDW 16.8 02/06/2023 06:48 AM     02/06/2023 06:48 AM    MPV 12.3 02/06/2023 06:48 AM     BMP:    Lab Results   Component Value Date/Time     02/06/2023 06:47 AM    K 3.2 02/06/2023 06:47 AM    K 4.3 02/05/2023 05:59 PM    CL 94 02/06/2023 06:47 AM    CO2 42 02/06/2023 06:47 AM    BUN 44 02/06/2023 06:47 AM    LABALBU 2.9 02/06/2023 06:47 AM    CREATININE 2.3 02/06/2023 06:47 AM    CALCIUM 8.3 02/06/2023 06:47 AM    GFRAA >60 10/16/2022 09:20 AM    LABGLOM 22 02/06/2023 06:47 AM    GLUCOSE 117 02/06/2023 06:47 AM       ASSESSMENT AND PLAN      Acute on chronic respiratory failure with hypoxia and hypercapnia: PRN bipap. Continue to monitor. Check ABG due to becoming more lethargic today. Bipap Recommendation per Pulm. When off Bipap, patient on 7 L of oxygen  Palliative care encounter: currently full code. CHF exacerbation with diastolic dysfunction: Bumex currently on hold. Continue to monitor intake and output. Dm type complicated with nephropathy: Hold Insulin for now. Atrial Fibrillation : rate is controlled. Continue current management  UTI: completed Zyvox  Pulm Hypertension: stable  Hx of Parkinson. Continue home medication. PEER to PEER Review. Declined request for LTAC Placement due to patient being on diamox IV and stated that patient has not completed in patient care treatment and needs to be weaned off diamox before patient can be discharged.

## 2023-02-06 NOTE — PROGRESS NOTES
INPATIENT CARDIOLOGY Follow UP    Name: Sameera Edgar    Age: 67 y.o. Date of Admission: 1/17/2023  5:16 PM    Date of Service: 2/6/2023      Referring Physician: Sadi Joe MD      Subjective:  Denies chest pain reports some improvement. Past Medical History:  Past Medical History:   Diagnosis Date    A-fib ([facility]     Acute on chronic congestive heart failure (HCC)     Anxiety     Asthma     CAD (coronary artery disease) 01/21/2016    Cancer ([facility]  breast ca 2006    right lumpectomy    Chronic kidney disease     nephrolithiasis    COPD exacerbation ([facility] 10/12/2022    Depression     Diabetes mellitus ([facility]     H/O mammogram     Hx MRSA infection     toe infection january 2012    Hyperlipidemia     Hypertension     Lateral epicondylitis     Morbid obesity (HCC)     SERENA on CPAP     Oxygen dependent     Parkinson's disease ([facility]     Tubal ligation status        Past Surgical History:  Past Surgical History:   Procedure Laterality Date    BREAST LUMPECTOMY      BREAST REDUCTION SURGERY      CARDIAC CATHETERIZATION  4/28/2014    Dr. Alice Serrano  01/11/2022    Dr. Yulissa Abrams  7/29/15    CT GUIDED CHEST TUBE  7/8/2022    CT GUIDED CHEST TUBE 7/8/2022 Luis Daniel Purcell MD [facility]    ENDOSCOPY, COLON, DIAGNOSTIC  7/19/15    GALLBLADDER SURGERY      LUMBAR LAMINECTOMY      TOE AMPUTATION      TONSILLECTOMY      UPPER GASTROINTESTINAL ENDOSCOPY      UPPER GASTROINTESTINAL ENDOSCOPY N/A 7/19/2022    EGD ESOPHAGOGASTRODUODENOSCOPY performed by Tena Abbott MD at [facility] ENDOSCOPY       Family History:  Family History   Problem Relation Age of Onset    Cancer Mother         Lung Ca    Cancer Father         lung Ca    Diabetes Sister     Heart Disease Sister     Seizures Son     Other Brother         sepsis       Social History:  Social History     Socioeconomic History    Marital status:       Spouse name: Not on file    Number of children: Not on file Years of education: Not on file    Highest education level: Not on file   Occupational History    Not on file   Tobacco Use    Smoking status: Never    Smokeless tobacco: Never   Vaping Use    Vaping Use: Never used   Substance and Sexual Activity    Alcohol use: No    Drug use: No    Sexual activity: Never   Other Topics Concern    Not on file   Social History Narrative    Not on file     Social Determinants of Health     Financial Resource Strain: Not on file   Food Insecurity: Not on file   Transportation Needs: Not on file   Physical Activity: Not on file   Stress: Not on file   Social Connections: Not on file   Intimate Partner Violence: Not on file   Housing Stability: Not on file       Allergies: Allergies   Allergen Reactions    Ciprofloxacin Nausea And Vomiting    Codeine Itching     Creepy crawly \" feelings    Digoxin And Related Other (See Comments)     History of Digoxin toxicity       Home Medications:  Prior to Admission medications    Medication Sig Start Date End Date Taking? Authorizing Provider   metoprolol succinate (TOPROL XL) 25 MG extended release tablet Take 25 mg by mouth in the morning and at bedtime   Yes Historical Provider, MD   BiPAP Machine MISC by Does not apply route Nightly.  12/6 30% PER NURSING HOME PAPERWORK    Historical Provider, MD   glucagon, rDNA, 1 MG injection Inject 1 mg into the muscle as needed for Low blood sugar    Historical Provider, MD   Glucose (TRUEPLUS) 15 GM/32ML GEL Take 15 g by mouth as needed    Historical Provider, MD   hydrOXYzine pamoate (VISTARIL) 25 MG capsule Take 25 mg by mouth 3 times daily as needed for Itching    Historical Provider, MD   acetaminophen (TYLENOL) 325 MG tablet Take 650 mg by mouth every 4 hours as needed for Pain    Historical Provider, MD   apixaban (ELIQUIS) 5 MG TABS tablet Take 1 tablet by mouth 2 times daily 12/24/22   Cresencio Gomez DO   bumetanide (BUMEX) 2 MG tablet Take 1 tablet by mouth daily 12/25/22   Marco Antonio Galeana Blade, DO   midodrine (PROAMATINE) 5 MG tablet Take 1 tablet by mouth 3 times daily (with meals) 12/25/22   Hao Dhillon,    LORazepam (ATIVAN) 0.5 MG tablet Take 0.5 mg by mouth nightly.     Historical Provider, MD   insulin lispro (HUMALOG) 100 UNIT/ML injection vial Inject into the skin 3 times daily (before meals) Sliding scale  200-249=2 UNITS  250-299=4 UNITS  300-349=6 UNITS  350-399=8 UNITS  400-500=10 UNITS    Historical Provider, MD   sertraline (ZOLOFT) 50 MG tablet Take 50 mg by mouth daily    Historical Provider, MD   loperamide (IMODIUM) 2 MG capsule Take 2 mg by mouth 4 times daily as needed for Diarrhea    Historical Provider, MD   OXYGEN Inhale 3 L into the lungs continuous    Historical Provider, MD   docusate sodium (COLACE, DULCOLAX) 100 MG CAPS Take 100 mg by mouth 2 times daily as needed for Constipation 9/8/22   Nicola Cornejo MD   insulin glargine (LANTUS) 100 UNIT/ML injection vial Inject 13 Units into the skin 2 times daily 9/8/22   Nicola Cornejo MD   atorvastatin (LIPITOR) 20 MG tablet Take 20 mg by mouth nightly    Historical Provider, MD   benzoin compound solution Apply 1 Applicatorful topically nightly Apply topically to right toe  Patient not taking: No sig reported    Historical Provider, MD   ipratropium-albuterol (DUONEB) 0.5-2.5 (3) MG/3ML SOLN nebulizer solution Inhale 1 vial into the lungs 4 times daily    Historical Provider, MD   miconazole (MICOTIN) 2 % powder Apply 1 each topically 2 times daily Apply topically under breasts twice daily    Historical Provider, MD   pantoprazole (PROTONIX) 40 MG tablet Take 40 mg by mouth every morning    Historical Provider, MD   mirtazapine (REMERON) 15 MG tablet Take 7.5 mg by mouth nightly    Historical Provider, MD   budesonide-formoterol (SYMBICORT) 160-4.5 MCG/ACT AERO Inhale 2 puffs into the lungs 2 times daily    Historical Provider, MD   metoprolol tartrate (LOPRESSOR) 25 MG tablet Take 0.5 tablets by mouth in the morning and 0.5 tablets before bedtime. 7/27/22 9/8/22  Miguel Gibbons MD   carbidopa-levodopa (SINEMET CR)  MG per extended release tablet Take 2 tablets by mouth in the morning and 2 tablets at noon and 2 tablets in the evening. 7/23/22   Miguel Gibbons MD   rOPINIRole (REQUIP) 1 MG tablet Take 1 tablet by mouth in the morning and 1 tablet at noon and 1 tablet before bedtime. 7/23/22   Miguel Gibbons MD   amiodarone (CORDARONE) 200 MG tablet Take 1 tablet by mouth in the morning. 7/24/22 9/8/22  Miguel Gibbons MD   budesonide (PULMICORT) 0.5 MG/2ML nebulizer suspension Take 2 mLs by nebulization in the morning and 2 mLs in the evening. 7/23/22 9/8/22  Miguel Gibbons MD   insulin [facility]) 100 UNIT/ML injection vial Inject 5 Units into the skin nightly 7/23/22 9/8/22  Miguel Gibbons MD   QUEtiapine (SEROQUEL) 25 MG tablet Take 1 tablet by mouth in the morning and 1 tablet before bedtime. 7/23/22 9/8/22  Miguel Gibbons MD   sucralfate (CARAFATE) 1 GM tablet Take 1 tablet by mouth in the morning and 1 tablet at noon and 1 tablet in the evening and 1 tablet before bedtime.  7/20/22   Leyla Gaines DO   divalproex (DEPAKOTE) 250 MG DR tablet Take 250 mg by mouth 2 times daily  7/23/22  Historical Provider, MD   ferrous sulfate (IRON 325) 325 (65 Fe) MG tablet Take 325 mg by mouth daily (with breakfast)  Patient not taking: Reported on 7/7/2022 7/23/22  Historical Provider, MD   aspirin EC 81 MG EC tablet Take 1 tablet by mouth daily 5/17/22   Lyle Guido MD   montelukast (SINGULAIR) 10 MG tablet Take 10 mg by mouth nightly    Historical Provider, MD       Current Medications:  Current Facility-Administered Medications   Medication Dose Route Frequency Provider Last Rate Last Admin    bumetanide (BUMEX) injection 2 mg  2 mg IntraVENous Once Finesse Donaldson MD        bumetanide (BUMEX) 12.5 mg in sodium chloride 0.9 % 125 mL infusion  0.5 mg/hr IntraVENous Continuous Terry Ocasio MD        digoxin [facility]) injection 125 mcg  125 mcg IntraVENous Daily Dorjuan josenn Rolan, DO   125 mcg at 02/06/23 6340    metoprolol succinate (TOPROL XL) extended release tablet 50 mg  50 mg Oral BID Isrrael Hui, DO   50 mg at 02/06/23 0843    acetaZOLAMIDE (DIAMOX) 500 mg in sodium chloride 0.9 % 100 mL IVPB  500 mg IntraVENous Q12H Shell Blackwell MD   Stopped at 02/06/23 1450    sodium chloride (OCEAN, BABY AYR) 0.65 % nasal spray 1 spray  1 spray Each Nostril PRN Marcella Hodgkin, MD   1 spray at 01/29/23 2051    [Held by provider] dilTIAZem (CARDIZEM) tablet 60 mg  60 mg Oral 3 times per day Bryon Kwon MD   60 mg at 01/20/23 0616    sucralfate (CARAFATE) tablet 1 g  1 g Oral 4x Daily Karel Padilla MD   1 g at 02/06/23 1649    rOPINIRole (REQUIP) tablet 1 mg  1 mg Oral TID Karel Padilla MD   1 mg at 02/06/23 1413    atorvastatin (LIPITOR) tablet 20 mg  20 mg Oral Nightly Karel Padilla MD   20 mg at 02/05/23 2234    ipratropium-albuterol (DUONEB) nebulizer solution 3 mL  1 vial Inhalation 4x Daily Karel Padilla MD   3 mL at 02/06/23 1515    [Held by provider] insulin glargine (LANTUS) injection vial 13 Units  13 Units SubCUTAneous BID Karel Padilla MD   13 Units at 01/27/23 0913    loperamide (IMODIUM) capsule 2 mg  2 mg Oral 4x Daily PRN Karel Padilla MD        [Held by provider] LORazepam (ATIVAN) tablet 0.5 mg  0.5 mg Oral Nightly Karel Padilla MD   0.5 mg at 01/20/23 2317    insulin lispro (HUMALOG) injection vial 0-4 Units  0-4 Units SubCUTAneous TID WC Karel Padilla MD   1 Units at 02/04/23 1244    insulin lispro (HUMALOG) injection vial 0-4 Units  0-4 Units SubCUTAneous Nightly Karel Padilla MD   4 Units at 01/18/23 2158    budesonide (PULMICORT) nebulizer suspension 500 mcg  0.5 mg Nebulization BID Karel Padilla MD   500 mcg at 02/06/23 6327    sodium chloride flush 0.9 % injection 10 mL  10 mL IntraVENous 2 times per day John Trejo MD Ricki   10 mL at 02/06/23 0844    sodium chloride flush 0.9 % injection 10 mL  10 mL IntraVENous PRN Aracelis Echols MD        0.9 % sodium chloride infusion   IntraVENous PRN Aracelis Echols MD 25 mL/hr at 02/04/23 0222 New Bag at 02/04/23 0222    promethazine (PHENERGAN) tablet 12.5 mg  12.5 mg Oral Q6H PRN Aracelis Echols MD        Or    ondansetron (ZOFRAN) injection 4 mg  4 mg IntraVENous Q6H PRN Aracelis Echols MD        polyethylene glycol (GLYCOLAX) packet 17 g  17 g Oral Daily PRN Aracelis Echols MD        acetaminophen (TYLENOL) tablet 650 mg  650 mg Oral Q6H PRN Aracelis Echols MD   650 mg at 02/03/23 1014    Or    acetaminophen (TYLENOL) suppository 650 mg  650 mg Rectal Q6H PRN Aracelis Echols MD        miconazole (MICOTIN) 2 % powder   Topical BID Gaby Payner, APRN - CNP   Given at 02/06/23 0842    arformoterol tartrate (BROVANA) nebulizer solution 15 mcg  15 mcg Nebulization BID Audrene Hussar, APRN - CNP   15 mcg at 02/06/23 0634    pantoprazole (PROTONIX) tablet 40 mg  40 mg Oral QAM AC Aaron Lowry, APRN - CNP   40 mg at 02/06/23 0535    glucose chewable tablet 16 g  4 tablet Oral PRN Audrene Hussar, APRN - CNP        dextrose bolus 10% 125 mL  125 mL IntraVENous PRN Audrene Hussar, APRN - CNP        Or    dextrose bolus 10% 250 mL  250 mL IntraVENous PRN Audrene Hussar, APRN - CNP        glucagon (rDNA) injection 1 mg  1 mg SubCUTAneous PRN Audrene Hussar, APRN - CNP        dextrose 10 % infusion   IntraVENous Continuous PRN Gaby Murraysar, APRN - CNP        apixaban (ELIQUIS) tablet 5 mg  5 mg Oral BID Audrene Hussar, APRN - CNP   5 mg at 02/06/23 0843    mirtazapine (REMERON) tablet 7.5 mg  7.5 mg Oral Nightly Gaby Payner, APRN - CNP   7.5 mg at 02/05/23 2235    carbidopa-levodopa (SINEMET CR)  MG per extended release tablet 1 tablet  1 tablet Oral TID RASHARD Currie - CNP   1 tablet at 02/06/23 1413    sertraline (ZOLOFT) tablet 50 mg  50 mg Oral Daily Gaby Stone, APRN - CNP   50 mg at 02/06/23 0843    montelukast (SINGULAIR) tablet 10 mg  10 mg Oral Nightly Dianne Brands, APRN - CNP   10 mg at 02/05/23 2235      bumetanide 0.1 mg/mL infusion      sodium chloride 25 mL/hr at 02/04/23 0222    dextrose         Physical Exam:  BP (!) 100/59   Pulse 83   Temp 97.3 °F (36.3 °C) (Axillary)   Resp 22   Ht 5' (1.524 m)   Wt 268 lb 12.8 oz (121.9 kg)   SpO2 90%   BMI 52.50 kg/m²   Wt Readings from Last 3 Encounters:   02/05/23 268 lb 12.8 oz (121.9 kg)   01/16/23 259 lb (117.5 kg)   01/16/23 259 lb (117.5 kg)       Appearance: Alert and oriented x3 not in acute distress.   Skin: Dry skin  Head: Atraumatic  Eyes: Intact extraocular muscles   ENMT: Mucous membranes are moist  Neck: Supple  Lungs: Clear to auscultation  Cardiac: Normal S1 and S2  Abdomen: Protuberant  Extremities: Intact range of motion  Neurologic: No focal neurological deficits  Peripheral Pulses: 2+ peripheral pulses    Intake/Output:    Intake/Output Summary (Last 24 hours) at 2/6/2023 1717  Last data filed at 2/6/2023 1550  Gross per 24 hour   Intake 500 ml   Output 350 ml   Net 150 ml     I/O this shift:  In: 380 [P.O.:380]  Out: 350 [Urine:350]    Laboratory Tests:  Recent Labs     02/04/23  0726 02/05/23  1759 02/06/23  0647    140 141   K 3.4* 4.3 3.2*   CL 95* 94* 94*   CO2 41* 37* 42*   BUN 49* 46* 44*   CREATININE 2.3* 2.3* 2.3*   GLUCOSE 125* 125* 117*   CALCIUM 8.3* 8.1* 8.3*     Lab Results   Component Value Date/Time    MG 1.5 02/06/2023 06:47 AM    MG 1.6 02/01/2023 09:16 AM    MG 1.6 01/30/2023 05:00 AM     Recent Labs     02/05/23  1759 02/06/23  0647   ALKPHOS 61 60   ALT <5 <5   AST 8 7   PROT 5.2* 5.0*   BILITOT 0.3 0.4   LABALBU 3.0* 2.9*     Recent Labs     02/06/23  0648   WBC 6.1   RBC 3.12*   HGB 7.8*   HCT 27.5*   MCV 88.1   MCH 25.0*   MCHC 28.4*   RDW 16.8*      MPV 12.3*     Lab Results   Component Value Date    CKTOTAL 25 01/18/2023    CKMB 2.1 04/25/2014    TROPONINI <0.01 10/26/2020    TROPONINI <0.01 08/24/2019    TROPONINI <0.01 05/29/2019     No results for input(s): CKTOTAL, CKMB, CKMBINDEX, TROPHS in the last 72 hours. Lab Results   Component Value Date    INR 2.1 02/03/2023    INR 1.6 02/02/2023    INR 1.9 02/01/2023    PROTIME 23.7 (H) 02/03/2023    PROTIME 18.8 (H) 02/02/2023    PROTIME 21.5 (H) 02/01/2023     Lab Results   Component Value Date    TSH 6.520 (H) 11/06/2022    TSH 8.350 (H) 09/27/2022    TSH 4.580 (H) 07/06/2022     Lab Results   Component Value Date    LABA1C 6.8 (H) 12/19/2022    LABA1C 6.1 (H) 08/24/2022    LABA1C 6.5 (H) 05/12/2022     No results found for: EAG  Lab Results   Component Value Date    CHOL 95 05/12/2022    CHOL 97 04/15/2022    CHOL 146 03/18/2022     Lab Results   Component Value Date    TRIG 93 05/12/2022    TRIG 171 (H) 04/15/2022    TRIG 134 03/18/2022     Lab Results   Component Value Date    HDL 38 05/12/2022    HDL 46 04/15/2022    HDL 50 03/18/2022     Lab Results   Component Value Date    LDLCALC 38 05/12/2022    LDLCALC 17 04/15/2022    LDLCALC 69 03/18/2022     Lab Results   Component Value Date    LABVLDL 19 05/12/2022    LABVLDL 34 04/15/2022    LABVLDL 27 03/18/2022    VLDL 84 09/19/2017     Lab Results   Component Value Date    CHOLHDLRATIO 3.3 03/13/2019    CHOLHDLRATIO 3.8 12/11/2018    CHOLHDLRATIO 3.0 09/04/2018     No results for input(s): PROBNP in the last 72 hours. Cardiac Tests:        TTE 7/7/22 Telly   Summary   Technically difficult study - limited visualization. Micro-bubble contrast injected to enhance left ventricular visualization. Normal left ventricular size and systolic function. Ejection fraction is visually estimated at 70-75%. Indeterminate diastolic function. No regional wall motion abnormalities seen. Mild left ventricular concentric hypertrophy noted. Moderately dilated right ventricle with reduced function. Biatrial dilation. Mild tricuspid regurgitation.    RVSP is at least 60 mmHg. Prominent pericardial fat pad. No definitive evidence of pericardial effusion. Stress test:    Pharm stress 1/2022  Gated SPECT left ventricular ejection fraction was calculated to be   74% with normal wall motion and thickening. TID ratio 1.08.    1.  ECG during the infusion did not change. 2.  The myocardial perfusion imaging was abnormal.   3.  The abnormality was a large sized, moderate fixed defect involving   the inferior and inferolateral wall suggestive prior infarct without   any reversibility. There was also a small sized mild perfusion defect   involving the mid to distal anterior wall suggestive ischemia. 4.  Overall left ventricular systolic function was normal without   regional wall motion abnormalities. 5.  No transient ischemic dilatation. 6.  High risk general pharmacologic stress test.      Cardiac catheterization:   [facility] 1/11/22 Diane  CONCLUSIONS:  1. Coronary artery disease:  A. Left main. No significant disease. B.  LAD. Heavily calcified proximal and mid vessel with 50% mid vessel stenosis. 60% proximal stenosis of a moderate size first diagonal branch. C.  LCX. 50% ostial narrowing of the AV groove branch in the mid vessel which is a bifurcation lesion with a large marginal branch which itself did not appear to have any significant disease. The AV groove branch of  the left circumflex continues to provide a posterior descending artery branch and the posterolateral branch (codominant vessel). D.  RCA. Codominant vessel with no significant angiographic disease. 2.  Normal left ventricular size with hyperdynamic left ventricular systolic function with an estimated ejection fraction of 75%. 3.  Systemic hypertension. 4.  Elevated left ventricular end-diastolic pressure. Echo Summary 1/19/2023: No previous echo for comparison. Technically adequate study. Left ventricle size is normal.   Mild concentric left ventricular hypertrophy.    Ejection fraction is visually estimated at 65%. No regional wall motion abnormalities seen. Indeterminate diastolic function. Right ventricle global systolic function is mildly reduced . Physiologic and/or trace mitral regurgitation is present. Moderate tricuspid regurgitation. RVSP is 47 mmHg. Pulmonary hypertension is mild to moderate . CXR reviewed: The ASCVD Risk score (Jennifer STUART, et al., 2019) failed to calculate for the following reasons: The patient has a prior MI or stroke diagnosis    ASSESSMENT / PLAN:    1. Acute on chronic diastolic CHF in the setting of significant acute on chronic kidney injury:  Volume management per nephrology  Continue beta-blocker and avoid nephrotoxic agents       2. CAD: Cath 1/11/2022 with moderate non-occlusive CAD treated medically. Continue statin/BB. No ASA due to eliquis. 3. Permanent Afib:   Recurrent despite multiple cardioversion's. Continue beta-blocker     4. Bradycardia issues:   Monitor closely on telemetry. 5. HTN:   Monitor closely       6. Lipids:   Continue on statin       7. DM:   Per primary service. 8. COPD/Asthma: On home oxygen. 9. Acute on CKD:   Nephrology following. 10. Anemia/Thrombocytopenia:  Monitor closely     11. SERENA     12. Parkinson's     13. Breast CA     14. Tobacco use                   Thank you for allowing me to participate in your patient's care. Please feel free to contact me if you have any questions or concerns.     Edgar Maria MD  [facility]) Cardiology

## 2023-02-07 ENCOUNTER — APPOINTMENT (OUTPATIENT)
Dept: GENERAL RADIOLOGY | Age: 73
End: 2023-02-07
Payer: MEDICARE

## 2023-02-07 LAB
ANION GAP SERPL CALCULATED.3IONS-SCNC: 10 MMOL/L (ref 7–16)
BUN BLDV-MCNC: 43 MG/DL (ref 6–23)
CALCIUM SERPL-MCNC: 8.2 MG/DL (ref 8.6–10.2)
CHLORIDE BLD-SCNC: 97 MMOL/L (ref 98–107)
CO2: 39 MMOL/L (ref 22–29)
CREAT SERPL-MCNC: 2.2 MG/DL (ref 0.5–1)
GFR SERPL CREATININE-BSD FRML MDRD: 23 ML/MIN/1.73
GLUCOSE BLD-MCNC: 144 MG/DL (ref 74–99)
MAGNESIUM: 1.6 MG/DL (ref 1.6–2.6)
METER GLUCOSE: 144 MG/DL (ref 74–99)
METER GLUCOSE: 168 MG/DL (ref 74–99)
METER GLUCOSE: 172 MG/DL (ref 74–99)
METER GLUCOSE: 176 MG/DL (ref 74–99)
PHOSPHORUS: 4.3 MG/DL (ref 2.5–4.5)
POTASSIUM SERPL-SCNC: 3.8 MMOL/L (ref 3.5–5)
SODIUM BLD-SCNC: 146 MMOL/L (ref 132–146)

## 2023-02-07 PROCEDURE — 6370000000 HC RX 637 (ALT 250 FOR IP): Performed by: INTERNAL MEDICINE

## 2023-02-07 PROCEDURE — 6370000000 HC RX 637 (ALT 250 FOR IP): Performed by: NURSE PRACTITIONER

## 2023-02-07 PROCEDURE — 80048 BASIC METABOLIC PNL TOTAL CA: CPT

## 2023-02-07 PROCEDURE — 2580000003 HC RX 258: Performed by: INTERNAL MEDICINE

## 2023-02-07 PROCEDURE — 83735 ASSAY OF MAGNESIUM: CPT

## 2023-02-07 PROCEDURE — 99232 SBSQ HOSP IP/OBS MODERATE 35: CPT | Performed by: INTERNAL MEDICINE

## 2023-02-07 PROCEDURE — 2500000003 HC RX 250 WO HCPCS: Performed by: INTERNAL MEDICINE

## 2023-02-07 PROCEDURE — 2060000000 HC ICU INTERMEDIATE R&B

## 2023-02-07 PROCEDURE — 94660 CPAP INITIATION&MGMT: CPT

## 2023-02-07 PROCEDURE — 6360000002 HC RX W HCPCS: Performed by: INTERNAL MEDICINE

## 2023-02-07 PROCEDURE — 82962 GLUCOSE BLOOD TEST: CPT

## 2023-02-07 PROCEDURE — 94640 AIRWAY INHALATION TREATMENT: CPT

## 2023-02-07 PROCEDURE — 84100 ASSAY OF PHOSPHORUS: CPT

## 2023-02-07 PROCEDURE — 99233 SBSQ HOSP IP/OBS HIGH 50: CPT | Performed by: INTERNAL MEDICINE

## 2023-02-07 PROCEDURE — 71045 X-RAY EXAM CHEST 1 VIEW: CPT

## 2023-02-07 PROCEDURE — 6360000002 HC RX W HCPCS: Performed by: NURSE PRACTITIONER

## 2023-02-07 PROCEDURE — 2700000000 HC OXYGEN THERAPY PER DAY

## 2023-02-07 PROCEDURE — 36415 COLL VENOUS BLD VENIPUNCTURE: CPT

## 2023-02-07 PROCEDURE — 6360000002 HC RX W HCPCS: Performed by: FAMILY MEDICINE

## 2023-02-07 RX ADMIN — ARFORMOTEROL TARTRATE 15 MCG: 15 SOLUTION RESPIRATORY (INHALATION) at 06:25

## 2023-02-07 RX ADMIN — SUCRALFATE 1 G: 1 TABLET ORAL at 20:38

## 2023-02-07 RX ADMIN — IPRATROPIUM BROMIDE AND ALBUTEROL SULFATE 3 ML: .5; 2.5 SOLUTION RESPIRATORY (INHALATION) at 13:30

## 2023-02-07 RX ADMIN — SUCRALFATE 1 G: 1 TABLET ORAL at 08:42

## 2023-02-07 RX ADMIN — IPRATROPIUM BROMIDE AND ALBUTEROL SULFATE 3 ML: .5; 2.5 SOLUTION RESPIRATORY (INHALATION) at 06:25

## 2023-02-07 RX ADMIN — ROPINIROLE HYDROCHLORIDE 1 MG: 1 TABLET, FILM COATED ORAL at 20:38

## 2023-02-07 RX ADMIN — IPRATROPIUM BROMIDE AND ALBUTEROL SULFATE 3 ML: .5; 2.5 SOLUTION RESPIRATORY (INHALATION) at 10:11

## 2023-02-07 RX ADMIN — ACETAZOLAMIDE SODIUM 500 MG: 500 INJECTION, POWDER, LYOPHILIZED, FOR SOLUTION INTRAVENOUS at 01:57

## 2023-02-07 RX ADMIN — CARBIDOPA AND LEVODOPA 1 TABLET: 50; 200 TABLET, EXTENDED RELEASE ORAL at 20:38

## 2023-02-07 RX ADMIN — BUDESONIDE 500 MCG: 0.5 SUSPENSION RESPIRATORY (INHALATION) at 06:25

## 2023-02-07 RX ADMIN — BUDESONIDE 500 MCG: 0.5 SUSPENSION RESPIRATORY (INHALATION) at 17:48

## 2023-02-07 RX ADMIN — METOPROLOL SUCCINATE 50 MG: 50 TABLET, EXTENDED RELEASE ORAL at 08:42

## 2023-02-07 RX ADMIN — ROPINIROLE HYDROCHLORIDE 1 MG: 1 TABLET, FILM COATED ORAL at 13:37

## 2023-02-07 RX ADMIN — ANTI-FUNGAL POWDER MICONAZOLE NITRATE TALC FREE: 1.42 POWDER TOPICAL at 08:43

## 2023-02-07 RX ADMIN — PANTOPRAZOLE SODIUM 40 MG: 40 TABLET, DELAYED RELEASE ORAL at 05:03

## 2023-02-07 RX ADMIN — DIGOXIN 125 MCG: 0.25 INJECTION INTRAMUSCULAR; INTRAVENOUS at 08:43

## 2023-02-07 RX ADMIN — ATORVASTATIN CALCIUM 20 MG: 20 TABLET, FILM COATED ORAL at 20:38

## 2023-02-07 RX ADMIN — Medication 10 ML: at 08:44

## 2023-02-07 RX ADMIN — CARBIDOPA AND LEVODOPA 1 TABLET: 50; 200 TABLET, EXTENDED RELEASE ORAL at 14:18

## 2023-02-07 RX ADMIN — MONTELUKAST SODIUM 10 MG: 10 TABLET, FILM COATED ORAL at 20:38

## 2023-02-07 RX ADMIN — MIRTAZAPINE 7.5 MG: 15 TABLET, FILM COATED ORAL at 20:38

## 2023-02-07 RX ADMIN — SERTRALINE 50 MG: 50 TABLET, FILM COATED ORAL at 08:43

## 2023-02-07 RX ADMIN — CARBIDOPA AND LEVODOPA 1 TABLET: 50; 200 TABLET, EXTENDED RELEASE ORAL at 08:43

## 2023-02-07 RX ADMIN — SUCRALFATE 1 G: 1 TABLET ORAL at 13:37

## 2023-02-07 RX ADMIN — ACETAZOLAMIDE SODIUM 500 MG: 500 INJECTION, POWDER, LYOPHILIZED, FOR SOLUTION INTRAVENOUS at 13:42

## 2023-02-07 RX ADMIN — APIXABAN 5 MG: 5 TABLET, FILM COATED ORAL at 08:43

## 2023-02-07 RX ADMIN — BUMETANIDE 0.5 MG/HR: 0.25 INJECTION INTRAMUSCULAR; INTRAVENOUS at 14:20

## 2023-02-07 RX ADMIN — ARFORMOTEROL TARTRATE 15 MCG: 15 SOLUTION RESPIRATORY (INHALATION) at 17:48

## 2023-02-07 RX ADMIN — SUCRALFATE 1 G: 1 TABLET ORAL at 17:27

## 2023-02-07 RX ADMIN — ANTI-FUNGAL POWDER MICONAZOLE NITRATE TALC FREE: 1.42 POWDER TOPICAL at 20:39

## 2023-02-07 RX ADMIN — ROPINIROLE HYDROCHLORIDE 1 MG: 1 TABLET, FILM COATED ORAL at 08:43

## 2023-02-07 RX ADMIN — IPRATROPIUM BROMIDE AND ALBUTEROL SULFATE 3 ML: .5; 2.5 SOLUTION RESPIRATORY (INHALATION) at 17:48

## 2023-02-07 NOTE — PLAN OF CARE
Problem: Respiratory - Adult  Goal: Achieves optimal ventilation and oxygenation  2/7/2023 0309 by Kenneth David RN  Outcome: Progressing     Problem: Cardiovascular - Adult  Goal: Absence of cardiac dysrhythmias or at baseline  2/7/2023 0309 by Kenneth David RN  Outcome: Progressing     Problem: Genitourinary - Adult  Goal: Urinary catheter remains patent  2/7/2023 0309 by Kenneth David RN  Outcome: Progressing     Problem: ABCDS Injury Assessment  Goal: Absence of physical injury  2/7/2023 0309 by Kenneth David RN  Outcome: Progressing

## 2023-02-07 NOTE — PROGRESS NOTES
Daughters Andre Alan and Hailey Edwards at the bedside requesting something for their mothers nerves. Contacted Dr Waqar Moody no new orders. He will be in the room soon to talk to the daughters.

## 2023-02-07 NOTE — PROGRESS NOTES
INPATIENT CARDIOLOGY Follow UP    Name: Andrea Rodriguez    Age: 67 y.o. Date of Admission: 1/17/2023  5:16 PM    Date of Service: 2/7/2023      Referring Physician: Kirill Hobbs MD      Subjective:  No significant event overnight. Past Medical History:  Past Medical History:   Diagnosis Date    [facility]fib ([facility]     Acute on chronic congestive heart failure (HCC)     Anxiety     Asthma     CAD (coronary artery disease) 01/21/2016    Cancer ([facility]  breast ca 2006    right lumpectomy    Chronic kidney disease     nephrolithiasis    COPD exacerbation ([facility] 10/12/2022    Depression     Diabetes mellitus ([facility]     H/O mammogram     Hx MRSA infection     toe infection january 2012    Hyperlipidemia     Hypertension     Lateral epicondylitis     Morbid obesity (HCC)     SERENA on CPAP     Oxygen dependent     Parkinson's disease ([facility]     Tubal ligation status        Past Surgical History:  Past Surgical History:   Procedure Laterality Date    BREAST LUMPECTOMY      BREAST REDUCTION SURGERY      CARDIAC CATHETERIZATION  4/28/2014    Dr. Nirmala Melo  01/11/2022    Dr. Chanda Flores  7/29/15    CT GUIDED CHEST TUBE  7/8/2022    CT GUIDED CHEST TUBE 7/8/2022 Lauren Gagnon MD [facility]    ENDOSCOPY, COLON, DIAGNOSTIC  7/19/15    GALLBLADDER SURGERY      LUMBAR LAMINECTOMY      TOE AMPUTATION      TONSILLECTOMY      UPPER GASTROINTESTINAL ENDOSCOPY      UPPER GASTROINTESTINAL ENDOSCOPY N/A 7/19/2022    EGD ESOPHAGOGASTRODUODENOSCOPY performed by Randy Dominguez MD at [facility] ENDOSCOPY       Family History:  Family History   Problem Relation Age of Onset    Cancer Mother         Lung Ca    Cancer Father         lung Ca    Diabetes Sister     Heart Disease Sister     Seizures Son     Other Brother         sepsis       Social History:  Social History     Socioeconomic History    Marital status:       Spouse name: Not on file    Number of children: Not on file    Years of education: Not on file    Highest education level: Not on file   Occupational History    Not on file   Tobacco Use    Smoking status: Never    Smokeless tobacco: Never   Vaping Use    Vaping Use: Never used   Substance and Sexual Activity    Alcohol use: No    Drug use: No    Sexual activity: Never   Other Topics Concern    Not on file   Social History Narrative    Not on file     Social Determinants of Health     Financial Resource Strain: Not on file   Food Insecurity: Not on file   Transportation Needs: Not on file   Physical Activity: Not on file   Stress: Not on file   Social Connections: Not on file   Intimate Partner Violence: Not on file   Housing Stability: Not on file       Allergies: Allergies   Allergen Reactions    Ciprofloxacin Nausea And Vomiting    Codeine Itching     Creepy crawly \" feelings    Digoxin And Related Other (See Comments)     History of Digoxin toxicity       Home Medications:  Prior to Admission medications    Medication Sig Start Date End Date Taking? Authorizing Provider   metoprolol succinate (TOPROL XL) 25 MG extended release tablet Take 25 mg by mouth in the morning and at bedtime   Yes Historical Provider, MD   BiPAP Machine MISC by Does not apply route Nightly.  12/6 30% PER NURSING HOME PAPERWORK    Historical Provider, MD   glucagon, rDNA, 1 MG injection Inject 1 mg into the muscle as needed for Low blood sugar    Historical Provider, MD   Glucose (TRUEPLUS) 15 GM/32ML GEL Take 15 g by mouth as needed    Historical Provider, MD   hydrOXYzine pamoate (VISTARIL) 25 MG capsule Take 25 mg by mouth 3 times daily as needed for Itching    Historical Provider, MD   acetaminophen (TYLENOL) 325 MG tablet Take 650 mg by mouth every 4 hours as needed for Pain    Historical Provider, MD   apixaban (ELIQUIS) 5 MG TABS tablet Take 1 tablet by mouth 2 times daily 12/24/22   Ruthie Hard, DO   bumetanide (BUMEX) 2 MG tablet Take 1 tablet by mouth daily 12/25/22   Ruthie Hard, DO midodrine (PROAMATINE) 5 MG tablet Take 1 tablet by mouth 3 times daily (with meals) 12/25/22   Genna Beckett DO   LORazepam (ATIVAN) 0.5 MG tablet Take 0.5 mg by mouth nightly.     Historical Provider, MD   insulin lispro (HUMALOG) 100 UNIT/ML injection vial Inject into the skin 3 times daily (before meals) Sliding scale  200-249=2 UNITS  250-299=4 UNITS  300-349=6 UNITS  350-399=8 UNITS  400-500=10 UNITS    Historical Provider, MD   sertraline (ZOLOFT) 50 MG tablet Take 50 mg by mouth daily    Historical Provider, MD   loperamide (IMODIUM) 2 MG capsule Take 2 mg by mouth 4 times daily as needed for Diarrhea    Historical Provider, MD   OXYGEN Inhale 3 L into the lungs continuous    Historical Provider, MD   docusate sodium (COLACE, DULCOLAX) 100 MG CAPS Take 100 mg by mouth 2 times daily as needed for Constipation 9/8/22   Althea Jacobson MD   insulin glargine (LANTUS) 100 UNIT/ML injection vial Inject 13 Units into the skin 2 times daily 9/8/22   Althea Jacobson MD   atorvastatin (LIPITOR) 20 MG tablet Take 20 mg by mouth nightly    Historical Provider, MD   benzoin compound solution Apply 1 Applicatorful topically nightly Apply topically to right toe  Patient not taking: No sig reported    Historical Provider, MD   ipratropium-albuterol (DUONEB) 0.5-2.5 (3) MG/3ML SOLN nebulizer solution Inhale 1 vial into the lungs 4 times daily    Historical Provider, MD   miconazole (MICOTIN) 2 % powder Apply 1 each topically 2 times daily Apply topically under breasts twice daily    Historical Provider, MD   pantoprazole (PROTONIX) 40 MG tablet Take 40 mg by mouth every morning    Historical Provider, MD   mirtazapine (REMERON) 15 MG tablet Take 7.5 mg by mouth nightly    Historical Provider, MD   budesonide-formoterol (SYMBICORT) 160-4.5 MCG/ACT AERO Inhale 2 puffs into the lungs 2 times daily    Historical Provider, MD   metoprolol tartrate (LOPRESSOR) 25 MG tablet Take 0.5 tablets by mouth in the morning and 0.5 tablets before bedtime. 7/27/22 9/8/22  Gordo Nuñez MD   carbidopa-levodopa (SINEMET CR)  MG per extended release tablet Take 2 tablets by mouth in the morning and 2 tablets at noon and 2 tablets in the evening. 7/23/22   Gordo Nuñez MD   rOPINIRole (REQUIP) 1 MG tablet Take 1 tablet by mouth in the morning and 1 tablet at noon and 1 tablet before bedtime. 7/23/22   Gordo Nuñez MD   amiodarone (CORDARONE) 200 MG tablet Take 1 tablet by mouth in the morning. 7/24/22 9/8/22  Gordo Nuñez MD   budesonide (PULMICORT) 0.5 MG/2ML nebulizer suspension Take 2 mLs by nebulization in the morning and 2 mLs in the evening. 7/23/22 9/8/22  Gordo Nuñez MD   insulin glargine-[facility]) 100 UNIT/ML injection vial Inject 5 Units into the skin nightly 7/23/22 9/8/22  Gordo Nuñez MD   QUEtiapine (SEROQUEL) 25 MG tablet Take 1 tablet by mouth in the morning and 1 tablet before bedtime. 7/23/22 9/8/22  Gordo Nuñez MD   sucralfate (CARAFATE) 1 GM tablet Take 1 tablet by mouth in the morning and 1 tablet at noon and 1 tablet in the evening and 1 tablet before bedtime.  7/20/22   Leyla Gaines DO   divalproex (DEPAKOTE) 250 MG DR tablet Take 250 mg by mouth 2 times daily  7/23/22  Historical Provider, MD   ferrous sulfate (IRON 325) 325 (65 Fe) MG tablet Take 325 mg by mouth daily (with breakfast)  Patient not taking: Reported on 7/7/2022 7/23/22  Historical Provider, MD   aspirin EC 81 MG EC tablet Take 1 tablet by mouth daily 5/17/22   Wendy Richmond MD   montelukast (SINGULAIR) 10 MG tablet Take 10 mg by mouth nightly    Historical Provider, MD       Current Medications:  Current Facility-Administered Medications   Medication Dose Route Frequency Provider Last Rate Last Admin    bumetanide (BUMEX) 12.5 mg in sodium chloride 0.9 % 125 mL infusion  0.5 mg/hr IntraVENous Continuous Barbara Kennedy MD 5 mL/hr at 02/07/23 1420 0.5 mg/hr at 02/07/23 142 digoxin (LANOXIN) injection 125 mcg  125 mcg IntraVENous Daily Oly Guerra, DO   125 mcg at 02/07/23 0601    metoprolol succinate (TOPROL XL) extended release tablet 50 mg  50 mg Oral BID Bridgett Croon, DO   50 mg at 02/07/23 0842    acetaZOLAMIDE (DIAMOX) 500 mg in sodium chloride 0.9 % 100 mL IVPB  500 mg IntraVENous Q12H Catalina Trotter MD   Stopped at 02/07/23 1417    sodium chloride (OCEAN, BABY AYR) 0.65 % nasal spray 1 spray  1 spray Each Nostril PRN Destiny Ngo MD   1 spray at 01/29/23 2051    [Held by provider] dilTIAZem (CARDIZEM) tablet 60 mg  60 mg Oral 3 times per day Alysa Dias MD   60 mg at 01/20/23 0616    sucralfate (CARAFATE) tablet 1 g  1 g Oral 4x Daily Raymundo Taylor MD   1 g at 02/07/23 1727    rOPINIRole (REQUIP) tablet 1 mg  1 mg Oral TID Raymundo Taylor MD   1 mg at 02/07/23 1337    atorvastatin (LIPITOR) tablet 20 mg  20 mg Oral Nightly Raymundo Taylor MD   20 mg at 02/06/23 2011    ipratropium-albuterol (DUONEB) nebulizer solution 3 mL  1 vial Inhalation 4x Daily Raymundo Taylor MD   3 mL at 02/07/23 1748    [Held by provider] insulin glargine (LANTUS) injection vial 13 Units  13 Units SubCUTAneous BID Raymundo Taylor MD   13 Units at 01/27/23 0913    loperamide (IMODIUM) capsule 2 mg  2 mg Oral 4x Daily PRN Raymundo Taylor MD        [Held by provider] LORazepam (ATIVAN) tablet 0.5 mg  0.5 mg Oral Nightly Raymundo Taylor MD   0.5 mg at 01/20/23 2317    insulin lispro (HUMALOG) injection vial 0-4 Units  0-4 Units SubCUTAneous TID WC Raymundo Taylor MD   1 Units at 02/04/23 1244    insulin lispro (HUMALOG) injection vial 0-4 Units  0-4 Units SubCUTAneous Nightly Raymundo Taylor MD   4 Units at 01/18/23 2158    budesonide (PULMICORT) nebulizer suspension 500 mcg  0.5 mg Nebulization BID Raymundo Taylor MD   500 mcg at 02/07/23 1748    sodium chloride flush 0.9 % injection 10 mL  10 mL IntraVENous 2 times per day Raymundo Taylor MD   10 mL at 02/07/23 5971    sodium chloride flush 0.9 % injection 10 mL  10 mL IntraVENous PRN Ervin Sethi MD        0.9 % sodium chloride infusion   IntraVENous PRN Ervin Sethi MD 25 mL/hr at 02/04/23 0222 New Bag at 02/04/23 0222    promethazine (PHENERGAN) tablet 12.5 mg  12.5 mg Oral Q6H PRN Ervin Sethi MD        Or    ondansetron (ZOFRAN) injection 4 mg  4 mg IntraVENous Q6H PRN Ervin Sethi MD        polyethylene glycol (GLYCOLAX) packet 17 g  17 g Oral Daily PRN Ervin Sethi MD        acetaminophen (TYLENOL) tablet 650 mg  650 mg Oral Q6H PRN Ervin Sethi MD   650 mg at 02/03/23 1014    Or    acetaminophen (TYLENOL) suppository 650 mg  650 mg Rectal Q6H PRN Ervin Sethi MD        miconazole (MICOTIN) 2 % powder   Topical BID Abhishek Case, APRN - CNP   Given at 02/07/23 0843    arformoterol tartrate (BROVANA) nebulizer solution 15 mcg  15 mcg Nebulization BID Abhishek Case, APRN - CNP   15 mcg at 02/07/23 1748    pantoprazole (PROTONIX) tablet 40 mg  40 mg Oral QAM KANDIS Lowry, APRN - CNP   40 mg at 02/07/23 0503    glucose chewable tablet 16 g  4 tablet Oral PRN Abhishek Case, APRN - CNP        dextrose bolus 10% 125 mL  125 mL IntraVENous PRN Abhishek Case, APRN - CNP        Or    dextrose bolus 10% 250 mL  250 mL IntraVENous PRN Abhishek Case, APRN - CNP        glucagon (rDNA) injection 1 mg  1 mg SubCUTAneous PRN Abhishek Case, APRN - CNP        dextrose 10 % infusion   IntraVENous Continuous PRN Abhishek Case, APRN - CNP        [Held by provider] apixaban (ELIQUIS) tablet 5 mg  5 mg Oral BID Abhishek Case, APRN - CNP   5 mg at 02/07/23 0843    mirtazapine (REMERON) tablet 7.5 mg  7.5 mg Oral Nightly Abhishek Case, APRN - CNP   7.5 mg at 02/06/23 2011    carbidopa-levodopa (SINEMET CR)  MG per extended release tablet 1 tablet  1 tablet Oral TID RASHARD Pinto CNP   1 tablet at 02/07/23 1418    sertraline (ZOLOFT) tablet 50 mg  50 mg Oral Daily RASHARD Pinto CNP   50 mg at 02/07/23 2359 montelukast (SINGULAIR) tablet 10 mg  10 mg Oral Nightly RASHARD Cadet CNP   10 mg at 02/06/23 2011      bumetanide 0.1 mg/mL infusion 0.5 mg/hr (02/07/23 1420)    sodium chloride 25 mL/hr at 02/04/23 0222    dextrose         Physical Exam:  BP (!) 97/53   Pulse 82   Temp 97.4 °F (36.3 °C) (Oral)   Resp 26   Ht 5' (1.524 m)   Wt 238 lb (108 kg)   SpO2 92%   BMI 46.48 kg/m²   Wt Readings from Last 3 Encounters:   02/07/23 238 lb (108 kg)   01/16/23 259 lb (117.5 kg)   01/16/23 259 lb (117.5 kg)       Appearance: Alert and oriented x3 not in acute distress.   Skin: Dry skin  Head: Atraumatic  Eyes: Intact extraocular muscles   ENMT: Mucous membranes are moist  Neck: Supple  Lungs: Clear to auscultation  Cardiac: Normal S1 and S2  Abdomen: Protuberant  Extremities: Intact range of motion  Neurologic: No focal neurological deficits  Peripheral Pulses: 2+ peripheral pulses    Intake/Output:    Intake/Output Summary (Last 24 hours) at 2/7/2023 1818  Last data filed at 2/7/2023 1420  Gross per 24 hour   Intake 250 ml   Output 2200 ml   Net -1950 ml     I/O this shift:  In: 250 [P.O.:250]  Out: 800 [Urine:800]    Laboratory Tests:  Recent Labs     02/05/23 1759 02/06/23  0647 02/07/23  1333    141 146   K 4.3 3.2* 3.8   CL 94* 94* 97*   CO2 37* 42* 39*   BUN 46* 44* 43*   CREATININE 2.3* 2.3* 2.2*   GLUCOSE 125* 117* 144*   CALCIUM 8.1* 8.3* 8.2*     Lab Results   Component Value Date/Time    MG 1.6 02/07/2023 01:33 PM    MG 1.5 02/06/2023 06:47 AM    MG 1.6 02/01/2023 09:16 AM     Recent Labs     02/05/23  1759 02/06/23  0647   ALKPHOS 61 60   ALT <5 <5   AST 8 7   PROT 5.2* 5.0*   BILITOT 0.3 0.4   LABALBU 3.0* 2.9*     Recent Labs     02/06/23  0648   WBC 6.1   RBC 3.12*   HGB 7.8*   HCT 27.5*   MCV 88.1   MCH 25.0*   MCHC 28.4*   RDW 16.8*      MPV 12.3*     Lab Results   Component Value Date    CKTOTAL 25 01/18/2023    CKMB 2.1 04/25/2014    TROPONINI <0.01 10/26/2020    TROPONINI <0.01 08/24/2019    TROPONINI <0.01 05/29/2019     No results for input(s): CKTOTAL, CKMB, CKMBINDEX, TROPHS in the last 72 hours. Lab Results   Component Value Date    INR 2.1 02/03/2023    INR 1.6 02/02/2023    INR 1.9 02/01/2023    PROTIME 23.7 (H) 02/03/2023    PROTIME 18.8 (H) 02/02/2023    PROTIME 21.5 (H) 02/01/2023     Lab Results   Component Value Date    TSH 6.520 (H) 11/06/2022    TSH 8.350 (H) 09/27/2022    TSH 4.580 (H) 07/06/2022     Lab Results   Component Value Date    LABA1C 6.8 (H) 12/19/2022    LABA1C 6.1 (H) 08/24/2022    LABA1C 6.5 (H) 05/12/2022     No results found for: EAG  Lab Results   Component Value Date    CHOL 95 05/12/2022    CHOL 97 04/15/2022    CHOL 146 03/18/2022     Lab Results   Component Value Date    TRIG 93 05/12/2022    TRIG 171 (H) 04/15/2022    TRIG 134 03/18/2022     Lab Results   Component Value Date    HDL 38 05/12/2022    HDL 46 04/15/2022    HDL 50 03/18/2022     Lab Results   Component Value Date    LDLCALC 38 05/12/2022    LDLCALC 17 04/15/2022    LDLCALC 69 03/18/2022     Lab Results   Component Value Date    LABVLDL 19 05/12/2022    LABVLDL 34 04/15/2022    LABVLDL 27 03/18/2022    VLDL 84 09/19/2017     Lab Results   Component Value Date    CHOLHDLRATIO 3.3 03/13/2019    CHOLHDLRATIO 3.8 12/11/2018    CHOLHDLRATIO 3.0 09/04/2018     No results for input(s): PROBNP in the last 72 hours. Cardiac Tests:        TTE 7/7/22 Telly   Summary   Technically difficult study - limited visualization. Micro-bubble contrast injected to enhance left ventricular visualization. Normal left ventricular size and systolic function. Ejection fraction is visually estimated at 70-75%. Indeterminate diastolic function. No regional wall motion abnormalities seen. Mild left ventricular concentric hypertrophy noted. Moderately dilated right ventricle with reduced function. Biatrial dilation. Mild tricuspid regurgitation. RVSP is at least 60 mmHg.    Prominent pericardial fat pad. No definitive evidence of pericardial effusion. Stress test:    Pharm stress 1/2022  Gated SPECT left ventricular ejection fraction was calculated to be   74% with normal wall motion and thickening. TID ratio 1.08.    1.  ECG during the infusion did not change. 2.  The myocardial perfusion imaging was abnormal.   3.  The abnormality was a large sized, moderate fixed defect involving   the inferior and inferolateral wall suggestive prior infarct without   any reversibility. There was also a small sized mild perfusion defect   involving the mid to distal anterior wall suggestive ischemia. 4.  Overall left ventricular systolic function was normal without   regional wall motion abnormalities. 5.  No transient ischemic dilatation. 6.  High risk general pharmacologic stress test.      Cardiac catheterization:   [facility] 1/11/22 Diane  CONCLUSIONS:  1. Coronary artery disease:  A. Left main. No significant disease. B.  LAD. Heavily calcified proximal and mid vessel with 50% mid vessel stenosis. 60% proximal stenosis of a moderate size first diagonal branch. C.  LCX. 50% ostial narrowing of the AV groove branch in the mid vessel which is a bifurcation lesion with a large marginal branch which itself did not appear to have any significant disease. The AV groove branch of  the left circumflex continues to provide a posterior descending artery branch and the posterolateral branch (codominant vessel). D.  RCA. Codominant vessel with no significant angiographic disease. 2.  Normal left ventricular size with hyperdynamic left ventricular systolic function with an estimated ejection fraction of 75%. 3.  Systemic hypertension. 4.  Elevated left ventricular end-diastolic pressure. Echo Summary 1/19/2023: No previous echo for comparison. Technically adequate study. Left ventricle size is normal.   Mild concentric left ventricular hypertrophy. Ejection fraction is visually estimated at 65%. No regional wall motion abnormalities seen. Indeterminate diastolic function. Right ventricle global systolic function is mildly reduced . Physiologic and/or trace mitral regurgitation is present. Moderate tricuspid regurgitation. RVSP is 47 mmHg. Pulmonary hypertension is mild to moderate . CXR reviewed: The ASCVD Risk score (Jennifer STUART, et al., 2019) failed to calculate for the following reasons: The patient has a prior MI or stroke diagnosis    ASSESSMENT / PLAN:    1. Acute on chronic diastolic CHF in the setting of significant acute on chronic kidney injury:  Volume management per nephrology  Continue beta-blocker and avoid nephrotoxic agents       2. CAD: Cath 1/11/2022 with moderate non-occlusive CAD treated medically. Continue statin/BB. No ASA due to eliquis. 3. Permanent Afib:   Recurrent despite multiple cardioversion's. Continue beta-blocker and consider discontinuation of diltiazem to avoid hypotension     4. Bradycardia issues:   Monitor closely on telemetry. Discontinue diltiazem  Decrease metoprolol dose if persistent bradycardia    5. HTN:   Monitor closely and hold blood pressure meds if hypotensive     6. Lipids:   Continue on statin       7. DM:   Per primary service. 8. COPD/Asthma: On home oxygen. 9. Acute on CKD:   Nephrology following. 10. Anemia/Thrombocytopenia:  Monitor closely     11. SERENA     12. Parkinson's     13. Breast CA     14. Tobacco use                   Thank you for allowing me to participate in your patient's care. Please feel free to contact me if you have any questions or concerns.     Christiano Corea MD  [address]  Cardiology

## 2023-02-07 NOTE — CARE COORDINATION
SS NOTE: COVID NEGATIVE 1/17, PT WILL NEED A RAPID NEGATIVE COVID TEST IF SHE RETURNS TO [facility]. Pt is on 3 liters of O2 and per nursing is most comfortable on continuous bipap- setting have been changed. Per nursing pt's chest xray and ABGs are not stable. Per nursing renal has been discussing ultrafiltration with pt. [facility] recd the result from the [address] to be completed yesterday- denial upheld relating that pt continues to need current acute care setting. SW discussed the result of the referral to [facility] with pt and her daughters Cipriano Tsai and Rama Alvarez in pt's room today. DR Davies to meet with them to discuss DNR status. Palliative care is involved but have not seen her recently. SS to continue Kingston Guzman.  ESTHER Cooley.2/7/2023.10:05AM.

## 2023-02-07 NOTE — PLAN OF CARE
Problem: Discharge Planning  Goal: Discharge to home or other facility with appropriate resources  Outcome: Progressing  Flowsheets (Taken 2/7/2023 0845)  Discharge to home or other facility with appropriate resources: Identify barriers to discharge with patient and caregiver     Problem: Respiratory - Adult  Goal: Achieves optimal ventilation and oxygenation  Outcome: Progressing  Flowsheets (Taken 2/7/2023 0845)  Achieves optimal ventilation and oxygenation:   Assess for changes in respiratory status   Assess for changes in mentation and behavior   Position to facilitate oxygenation and minimize respiratory effort   Oxygen supplementation based on oxygen saturation or arterial blood gases     Problem: Cardiovascular - Adult  Goal: Maintains optimal cardiac output and hemodynamic stability  Outcome: Progressing  Flowsheets (Taken 2/7/2023 0845)  Maintains optimal cardiac output and hemodynamic stability:   Monitor blood pressure and heart rate   Monitor urine output and notify Licensed Independent Practitioner for values outside of normal range   Assess for signs of decreased cardiac output  Goal: Absence of cardiac dysrhythmias or at baseline  Outcome: Progressing  Flowsheets (Taken 2/7/2023 0845)  Absence of cardiac dysrhythmias or at baseline: Monitor cardiac rate and rhythm     Problem: Genitourinary - Adult  Goal: Absence of urinary retention  Outcome: Progressing  Flowsheets (Taken 2/7/2023 0845)  Absence of urinary retention:   Assess patients ability to void and empty bladder   Monitor intake/output and perform bladder scan as needed  Goal: Urinary catheter remains patent  Outcome: Progressing  Flowsheets (Taken 2/7/2023 0845)  Urinary catheter remains patent: Assess patency of urinary catheter     Problem: Chronic Conditions and Co-morbidities  Goal: Patient's chronic conditions and co-morbidity symptoms are monitored and maintained or improved  Outcome: Progressing  Flowsheets (Taken 2/7/2023 46)  Care Plan - Patient's Chronic Conditions and Co-Morbidity Symptoms are Monitored and Maintained or Improved: Monitor and assess patient's chronic conditions and comorbid symptoms for stability, deterioration, or improvement     Problem: Skin/Tissue Integrity  Goal: Absence of new skin breakdown  Description: 1. Monitor for areas of redness and/or skin breakdown  2. Assess vascular access sites hourly  3. Every 4-6 hours minimum:  Change oxygen saturation probe site  4. Every 4-6 hours:  If on nasal continuous positive airway pressure, respiratory therapy assess nares and determine need for appliance change or resting period.   Outcome: Progressing     Problem: Safety - Adult  Goal: Free from fall injury  Outcome: Progressing  Flowsheets (Taken 2/7/2023 1033)  Free From Fall Injury: Dixie Galeas family/caregiver on patient safety     Problem: ABCDS Injury Assessment  Goal: Absence of physical injury  Outcome: Progressing  Flowsheets (Taken 2/7/2023 1033)  Absence of Physical Injury: Implement safety measures based on patient assessment     Problem: Pain  Goal: Verbalizes/displays adequate comfort level or baseline comfort level  Outcome: Progressing  Flowsheets (Taken 2/7/2023 0830)  Verbalizes/displays adequate comfort level or baseline comfort level:   Encourage patient to monitor pain and request assistance   Assess pain using appropriate pain scale   Administer analgesics based on type and severity of pain and evaluate response   Implement non-pharmacological measures as appropriate and evaluate response     Problem: Nutrition Deficit:  Goal: Optimize nutritional status  Outcome: Progressing

## 2023-02-07 NOTE — PROGRESS NOTES
Resp placed patient back on bipap per patient request due to SOB. Patient showed a little increase WOB. RT placed patient on her bipap settings. Saturation is 94%.

## 2023-02-07 NOTE — PROGRESS NOTES
Department of Internal Medicine  General Internal Medicine  Attending Progress Note  Chief Complaint   Patient presents with    Respiratory Distress     Normally on 3L NC, came in on CPAP, given total of 50mcg push dose epi for low BP by EMS     SUBJECTIVE:    Reports that she needs med for anxiety. Continue current management.      OBJECTIVE      Medications    Current Facility-Administered Medications: bumetanide (BUMEX) 12.5 mg in sodium chloride 0.9 % 125 mL infusion, 0.5 mg/hr, IntraVENous, Continuous  digoxin (LANOXIN) injection 125 mcg, 125 mcg, IntraVENous, Daily  metoprolol succinate (TOPROL XL) extended release tablet 50 mg, 50 mg, Oral, BID  acetaZOLAMIDE (DIAMOX) 500 mg in sodium chloride 0.9 % 100 mL IVPB, 500 mg, IntraVENous, Q12H  sodium chloride (OCEAN, BABY AYR) 0.65 % nasal spray 1 spray, 1 spray, Each Nostril, PRN  [Held by provider] dilTIAZem (CARDIZEM) tablet 60 mg, 60 mg, Oral, 3 times per day  sucralfate (CARAFATE) tablet 1 g, 1 g, Oral, 4x Daily  rOPINIRole (REQUIP) tablet 1 mg, 1 mg, Oral, TID  atorvastatin (LIPITOR) tablet 20 mg, 20 mg, Oral, Nightly  ipratropium-albuterol (DUONEB) nebulizer solution 3 mL, 1 vial, Inhalation, 4x Daily  [Held by provider] insulin glargine (LANTUS) injection vial 13 Units, 13 Units, SubCUTAneous, BID  loperamide (IMODIUM) capsule 2 mg, 2 mg, Oral, 4x Daily PRN  [Held by provider] LORazepam (ATIVAN) tablet 0.5 mg, 0.5 mg, Oral, Nightly  insulin lispro (HUMALOG) injection vial 0-4 Units, 0-4 Units, SubCUTAneous, TID WC  insulin lispro (HUMALOG) injection vial 0-4 Units, 0-4 Units, SubCUTAneous, Nightly  budesonide (PULMICORT) nebulizer suspension 500 mcg, 0.5 mg, Nebulization, BID  sodium chloride flush 0.9 % injection 10 mL, 10 mL, IntraVENous, 2 times per day  sodium chloride flush 0.9 % injection 10 mL, 10 mL, IntraVENous, PRN  0.9 % sodium chloride infusion, , IntraVENous, PRN  promethazine (PHENERGAN) tablet 12.5 mg, 12.5 mg, Oral, Q6H PRN **OR** ondansetron (ZOFRAN) injection 4 mg, 4 mg, IntraVENous, Q6H PRN  polyethylene glycol (GLYCOLAX) packet 17 g, 17 g, Oral, Daily PRN  acetaminophen (TYLENOL) tablet 650 mg, 650 mg, Oral, Q6H PRN **OR** acetaminophen (TYLENOL) suppository 650 mg, 650 mg, Rectal, Q6H PRN  miconazole (MICOTIN) 2 % powder, , Topical, BID  arformoterol tartrate (BROVANA) nebulizer solution 15 mcg, 15 mcg, Nebulization, BID  pantoprazole (PROTONIX) tablet 40 mg, 40 mg, Oral, QAM AC  glucose chewable tablet 16 g, 4 tablet, Oral, PRN  dextrose bolus 10% 125 mL, 125 mL, IntraVENous, PRN **OR** dextrose bolus 10% 250 mL, 250 mL, IntraVENous, PRN  glucagon (rDNA) injection 1 mg, 1 mg, SubCUTAneous, PRN  dextrose 10 % infusion, , IntraVENous, Continuous PRN  [Held by provider] apixaban (ELIQUIS) tablet 5 mg, 5 mg, Oral, BID  mirtazapine (REMERON) tablet 7.5 mg, 7.5 mg, Oral, Nightly  carbidopa-levodopa (SINEMET CR)  MG per extended release tablet 1 tablet, 1 tablet, Oral, TID  sertraline (ZOLOFT) tablet 50 mg, 50 mg, Oral, Daily  montelukast (SINGULAIR) tablet 10 mg, 10 mg, Oral, Nightly  Physical    VITALS:  BP (!) 97/53   Pulse 82   Temp 97.4 °F (36.3 °C) (Oral)   Resp 21   Ht 5' (1.524 m)   Wt 238 lb (108 kg)   SpO2 92%   BMI 46.48 kg/m²   CONSTITUTIONAL:  awake, cooperative, and no apparent distress  EYES:  extra-ocular muscles intact  ENT:  normocepalic, without obvious abnormality  NECK:  supple, symmetrical, trachea midline  LUNGS:  no increased work of breathing, no retractions, and clear to auscultation  CARDIOVASCULAR:  normal apical pulses and normal S1 and S2  ABDOMEN:  normal bowel sounds, non-distended, and non-tender  MUSCULOSKELETAL:  full range of motion noted  NEUROLOGIC:  Mental Status Exam:  Level of Alertness:   awake  Orientation:   person, place, time  SKIN:  no bruising or bleeding  Data    CBC:   Lab Results   Component Value Date/Time    WBC 6.1 02/06/2023 06:48 AM    RBC 3.12 02/06/2023 06:48 AM    HGB 7.8 02/06/2023 06:48 AM    HCT 27.5 02/06/2023 06:48 AM    MCV 88.1 02/06/2023 06:48 AM    MCH 25.0 02/06/2023 06:48 AM    MCHC 28.4 02/06/2023 06:48 AM    RDW 16.8 02/06/2023 06:48 AM     02/06/2023 06:48 AM    MPV 12.3 02/06/2023 06:48 AM     BMP:    Lab Results   Component Value Date/Time     02/07/2023 01:33 PM    K 3.8 02/07/2023 01:33 PM    K 4.3 02/05/2023 05:59 PM    CL 97 02/07/2023 01:33 PM    CO2 39 02/07/2023 01:33 PM    BUN 43 02/07/2023 01:33 PM    LABALBU 2.9 02/06/2023 06:47 AM    CREATININE 2.2 02/07/2023 01:33 PM    CALCIUM 8.2 02/07/2023 01:33 PM    GFRAA >60 10/16/2022 09:20 AM    LABGLOM 23 02/07/2023 01:33 PM    GLUCOSE 144 02/07/2023 01:33 PM       ASSESSMENT AND PLAN      Acute on chronic respiratory failure with hypoxia and hypercapnia:  Palliative care encounter  Hyperlipidemia  Hypertension Essential  Anemia due to CKD   CKD stage 3:  DM type 2 complicated with Nephropathy. Atrial Fibrillation: rate controlled  CHF with diastolic dysfunction: continue  UTI: completed Zyvox. Plans:  Per nephro request, Dialysis access to be placed. Hold eliquis  Place SCD Placement. Continue Bipap  Placement planning pending improvement of symptoms.

## 2023-02-08 LAB
ANION GAP SERPL CALCULATED.3IONS-SCNC: 10 MMOL/L (ref 7–16)
BUN BLDV-MCNC: 41 MG/DL (ref 6–23)
CALCIUM SERPL-MCNC: 8.1 MG/DL (ref 8.6–10.2)
CHLORIDE BLD-SCNC: 96 MMOL/L (ref 98–107)
CO2: 40 MMOL/L (ref 22–29)
CREAT SERPL-MCNC: 2.2 MG/DL (ref 0.5–1)
DIGOXIN LEVEL: 1.1 NG/ML (ref 0.8–2)
GFR SERPL CREATININE-BSD FRML MDRD: 23 ML/MIN/1.73
GLUCOSE BLD-MCNC: 117 MG/DL (ref 74–99)
HBV SURFACE AB TITR SER: NORMAL {TITER}
HCT VFR BLD CALC: 30.1 % (ref 34–48)
HEMOGLOBIN: 8.3 G/DL (ref 11.5–15.5)
HEPATITIS B SURFACE ANTIGEN INTERPRETATION: NORMAL
MAGNESIUM: 1.6 MG/DL (ref 1.6–2.6)
MCH RBC QN AUTO: 24.6 PG (ref 26–35)
MCHC RBC AUTO-ENTMCNC: 27.6 % (ref 32–34.5)
MCV RBC AUTO: 89.3 FL (ref 80–99.9)
METER GLUCOSE: 139 MG/DL (ref 74–99)
METER GLUCOSE: 155 MG/DL (ref 74–99)
METER GLUCOSE: 164 MG/DL (ref 74–99)
PDW BLD-RTO: 17.3 FL (ref 11.5–15)
PHOSPHORUS: 4.1 MG/DL (ref 2.5–4.5)
PLATELET # BLD: 159 E9/L (ref 130–450)
PMV BLD AUTO: 11.5 FL (ref 7–12)
POTASSIUM SERPL-SCNC: 3.1 MMOL/L (ref 3.5–5)
RBC # BLD: 3.37 E12/L (ref 3.5–5.5)
SODIUM BLD-SCNC: 146 MMOL/L (ref 132–146)
WBC # BLD: 4.5 E9/L (ref 4.5–11.5)

## 2023-02-08 PROCEDURE — 6370000000 HC RX 637 (ALT 250 FOR IP): Performed by: NURSE PRACTITIONER

## 2023-02-08 PROCEDURE — 6370000000 HC RX 637 (ALT 250 FOR IP): Performed by: INTERNAL MEDICINE

## 2023-02-08 PROCEDURE — 87340 HEPATITIS B SURFACE AG IA: CPT

## 2023-02-08 PROCEDURE — 82962 GLUCOSE BLOOD TEST: CPT

## 2023-02-08 PROCEDURE — 6360000002 HC RX W HCPCS: Performed by: INTERNAL MEDICINE

## 2023-02-08 PROCEDURE — 86706 HEP B SURFACE ANTIBODY: CPT

## 2023-02-08 PROCEDURE — 2500000003 HC RX 250 WO HCPCS: Performed by: INTERNAL MEDICINE

## 2023-02-08 PROCEDURE — 2700000000 HC OXYGEN THERAPY PER DAY

## 2023-02-08 PROCEDURE — 85027 COMPLETE CBC AUTOMATED: CPT

## 2023-02-08 PROCEDURE — P9047 ALBUMIN (HUMAN), 25%, 50ML: HCPCS | Performed by: INTERNAL MEDICINE

## 2023-02-08 PROCEDURE — 36415 COLL VENOUS BLD VENIPUNCTURE: CPT

## 2023-02-08 PROCEDURE — 6360000002 HC RX W HCPCS: Performed by: FAMILY MEDICINE

## 2023-02-08 PROCEDURE — 94640 AIRWAY INHALATION TREATMENT: CPT

## 2023-02-08 PROCEDURE — 99232 SBSQ HOSP IP/OBS MODERATE 35: CPT | Performed by: INTERNAL MEDICINE

## 2023-02-08 PROCEDURE — 97110 THERAPEUTIC EXERCISES: CPT | Performed by: PHYSICAL THERAPIST

## 2023-02-08 PROCEDURE — 94660 CPAP INITIATION&MGMT: CPT

## 2023-02-08 PROCEDURE — 6360000002 HC RX W HCPCS: Performed by: NURSE PRACTITIONER

## 2023-02-08 PROCEDURE — 84100 ASSAY OF PHOSPHORUS: CPT

## 2023-02-08 PROCEDURE — 2580000003 HC RX 258: Performed by: INTERNAL MEDICINE

## 2023-02-08 PROCEDURE — 80162 ASSAY OF DIGOXIN TOTAL: CPT

## 2023-02-08 PROCEDURE — 83735 ASSAY OF MAGNESIUM: CPT

## 2023-02-08 PROCEDURE — 2060000000 HC ICU INTERMEDIATE R&B

## 2023-02-08 PROCEDURE — 80048 BASIC METABOLIC PNL TOTAL CA: CPT

## 2023-02-08 PROCEDURE — 97530 THERAPEUTIC ACTIVITIES: CPT | Performed by: PHYSICAL THERAPIST

## 2023-02-08 PROCEDURE — 99233 SBSQ HOSP IP/OBS HIGH 50: CPT | Performed by: INTERNAL MEDICINE

## 2023-02-08 RX ORDER — POTASSIUM CHLORIDE 20 MEQ/1
40 TABLET, EXTENDED RELEASE ORAL ONCE
Status: COMPLETED | OUTPATIENT
Start: 2023-02-08 | End: 2023-02-08

## 2023-02-08 RX ORDER — ALBUMIN (HUMAN) 12.5 G/50ML
50 SOLUTION INTRAVENOUS ONCE
Status: COMPLETED | OUTPATIENT
Start: 2023-02-08 | End: 2023-02-08

## 2023-02-08 RX ADMIN — ROPINIROLE HYDROCHLORIDE 1 MG: 1 TABLET, FILM COATED ORAL at 21:54

## 2023-02-08 RX ADMIN — SALINE NASAL SPRAY 1 SPRAY: 1.5 SOLUTION NASAL at 10:40

## 2023-02-08 RX ADMIN — SUCRALFATE 1 G: 1 TABLET ORAL at 13:02

## 2023-02-08 RX ADMIN — ROPINIROLE HYDROCHLORIDE 1 MG: 1 TABLET, FILM COATED ORAL at 15:17

## 2023-02-08 RX ADMIN — BUDESONIDE 500 MCG: 0.5 SUSPENSION RESPIRATORY (INHALATION) at 06:18

## 2023-02-08 RX ADMIN — ANTI-FUNGAL POWDER MICONAZOLE NITRATE TALC FREE: 1.42 POWDER TOPICAL at 10:40

## 2023-02-08 RX ADMIN — ARFORMOTEROL TARTRATE 15 MCG: 15 SOLUTION RESPIRATORY (INHALATION) at 06:18

## 2023-02-08 RX ADMIN — ACETAZOLAMIDE SODIUM 500 MG: 500 INJECTION, POWDER, LYOPHILIZED, FOR SOLUTION INTRAVENOUS at 02:36

## 2023-02-08 RX ADMIN — CARBIDOPA AND LEVODOPA 1 TABLET: 50; 200 TABLET, EXTENDED RELEASE ORAL at 21:55

## 2023-02-08 RX ADMIN — IPRATROPIUM BROMIDE AND ALBUTEROL SULFATE 3 ML: .5; 2.5 SOLUTION RESPIRATORY (INHALATION) at 13:25

## 2023-02-08 RX ADMIN — SERTRALINE 50 MG: 50 TABLET, FILM COATED ORAL at 13:02

## 2023-02-08 RX ADMIN — ALBUMIN (HUMAN) 50 G: 0.25 INJECTION, SOLUTION INTRAVENOUS at 17:49

## 2023-02-08 RX ADMIN — IPRATROPIUM BROMIDE AND ALBUTEROL SULFATE 3 ML: .5; 2.5 SOLUTION RESPIRATORY (INHALATION) at 17:01

## 2023-02-08 RX ADMIN — DIGOXIN 125 MCG: 0.25 INJECTION INTRAMUSCULAR; INTRAVENOUS at 11:34

## 2023-02-08 RX ADMIN — IPRATROPIUM BROMIDE AND ALBUTEROL SULFATE 3 ML: .5; 2.5 SOLUTION RESPIRATORY (INHALATION) at 09:19

## 2023-02-08 RX ADMIN — CARBIDOPA AND LEVODOPA 1 TABLET: 50; 200 TABLET, EXTENDED RELEASE ORAL at 14:50

## 2023-02-08 RX ADMIN — Medication 10 ML: at 10:40

## 2023-02-08 RX ADMIN — METOPROLOL SUCCINATE 50 MG: 50 TABLET, EXTENDED RELEASE ORAL at 21:55

## 2023-02-08 RX ADMIN — BUDESONIDE 500 MCG: 0.5 SUSPENSION RESPIRATORY (INHALATION) at 17:01

## 2023-02-08 RX ADMIN — SUCRALFATE 1 G: 1 TABLET ORAL at 17:50

## 2023-02-08 RX ADMIN — ARFORMOTEROL TARTRATE 15 MCG: 15 SOLUTION RESPIRATORY (INHALATION) at 17:01

## 2023-02-08 RX ADMIN — ACETAZOLAMIDE SODIUM 500 MG: 500 INJECTION, POWDER, LYOPHILIZED, FOR SOLUTION INTRAVENOUS at 14:50

## 2023-02-08 RX ADMIN — MONTELUKAST SODIUM 10 MG: 10 TABLET, FILM COATED ORAL at 21:55

## 2023-02-08 RX ADMIN — ATORVASTATIN CALCIUM 20 MG: 20 TABLET, FILM COATED ORAL at 21:55

## 2023-02-08 RX ADMIN — IPRATROPIUM BROMIDE AND ALBUTEROL SULFATE 3 ML: .5; 2.5 SOLUTION RESPIRATORY (INHALATION) at 06:18

## 2023-02-08 RX ADMIN — MIRTAZAPINE 7.5 MG: 15 TABLET, FILM COATED ORAL at 21:55

## 2023-02-08 RX ADMIN — SUCRALFATE 1 G: 1 TABLET ORAL at 21:54

## 2023-02-08 RX ADMIN — POTASSIUM CHLORIDE 40 MEQ: 1500 TABLET, EXTENDED RELEASE ORAL at 17:50

## 2023-02-08 RX ADMIN — ANTI-FUNGAL POWDER MICONAZOLE NITRATE TALC FREE: 1.42 POWDER TOPICAL at 21:56

## 2023-02-08 RX ADMIN — BUMETANIDE 0.5 MG/HR: 0.25 INJECTION INTRAMUSCULAR; INTRAVENOUS at 18:19

## 2023-02-08 ASSESSMENT — PAIN SCALES - GENERAL: PAINLEVEL_OUTOF10: 0

## 2023-02-08 NOTE — PROGRESS NOTES
Department of Internal Medicine  General Internal Medicine  Attending Progress Note  Chief Complaint   Patient presents with    Respiratory Distress     Normally on 3L NC, came in on CPAP, given total of 50mcg push dose epi for low BP by EMS     SUBJECTIVE:    Reports that she is doing well. No fever and chills. No chest pain.  Aware of plans to continue current management while awaiting for placement of Dialysis cath for     OBJECTIVE      Medications    Current Facility-Administered Medications: albumin human 25 % IV solution 50 g, 50 g, IntraVENous, Once  bumetanide (BUMEX) 12.5 mg in sodium chloride 0.9 % 125 mL infusion, 0.5 mg/hr, IntraVENous, Continuous  digoxin (LANOXIN) injection 125 mcg, 125 mcg, IntraVENous, Daily  metoprolol succinate (TOPROL XL) extended release tablet 50 mg, 50 mg, Oral, BID  acetaZOLAMIDE (DIAMOX) 500 mg in sodium chloride 0.9 % 100 mL IVPB, 500 mg, IntraVENous, Q12H  sodium chloride (OCEAN, BABY AYR) 0.65 % nasal spray 1 spray, 1 spray, Each Nostril, PRN  [Held by provider] dilTIAZem (CARDIZEM) tablet 60 mg, 60 mg, Oral, 3 times per day  sucralfate (CARAFATE) tablet 1 g, 1 g, Oral, 4x Daily  rOPINIRole (REQUIP) tablet 1 mg, 1 mg, Oral, TID  atorvastatin (LIPITOR) tablet 20 mg, 20 mg, Oral, Nightly  ipratropium-albuterol (DUONEB) nebulizer solution 3 mL, 1 vial, Inhalation, 4x Daily  [Held by provider] insulin glargine (LANTUS) injection vial 13 Units, 13 Units, SubCUTAneous, BID  loperamide (IMODIUM) capsule 2 mg, 2 mg, Oral, 4x Daily PRN  [Held by provider] LORazepam (ATIVAN) tablet 0.5 mg, 0.5 mg, Oral, Nightly  insulin lispro (HUMALOG) injection vial 0-4 Units, 0-4 Units, SubCUTAneous, TID WC  insulin lispro (HUMALOG) injection vial 0-4 Units, 0-4 Units, SubCUTAneous, Nightly  budesonide (PULMICORT) nebulizer suspension 500 mcg, 0.5 mg, Nebulization, BID  sodium chloride flush 0.9 % injection 10 mL, 10 mL, IntraVENous, 2 times per day  sodium chloride flush 0.9 % injection 10 mL, 10 mL, IntraVENous, PRN  0.9 % sodium chloride infusion, , IntraVENous, PRN  promethazine (PHENERGAN) tablet 12.5 mg, 12.5 mg, Oral, Q6H PRN **OR** ondansetron (ZOFRAN) injection 4 mg, 4 mg, IntraVENous, Q6H PRN  polyethylene glycol (GLYCOLAX) packet 17 g, 17 g, Oral, Daily PRN  acetaminophen (TYLENOL) tablet 650 mg, 650 mg, Oral, Q6H PRN **OR** acetaminophen (TYLENOL) suppository 650 mg, 650 mg, Rectal, Q6H PRN  miconazole (MICOTIN) 2 % powder, , Topical, BID  arformoterol tartrate (BROVANA) nebulizer solution 15 mcg, 15 mcg, Nebulization, BID  pantoprazole (PROTONIX) tablet 40 mg, 40 mg, Oral, QAM AC  glucose chewable tablet 16 g, 4 tablet, Oral, PRN  dextrose bolus 10% 125 mL, 125 mL, IntraVENous, PRN **OR** dextrose bolus 10% 250 mL, 250 mL, IntraVENous, PRN  glucagon (rDNA) injection 1 mg, 1 mg, SubCUTAneous, PRN  dextrose 10 % infusion, , IntraVENous, Continuous PRN  [Held by provider] apixaban (ELIQUIS) tablet 5 mg, 5 mg, Oral, BID  mirtazapine (REMERON) tablet 7.5 mg, 7.5 mg, Oral, Nightly  carbidopa-levodopa (SINEMET CR)  MG per extended release tablet 1 tablet, 1 tablet, Oral, TID  sertraline (ZOLOFT) tablet 50 mg, 50 mg, Oral, Daily  montelukast (SINGULAIR) tablet 10 mg, 10 mg, Oral, Nightly  Physical    VITALS:  BP (!) 97/54   Pulse 91   Temp 97 °F (36.1 °C) (Axillary)   Resp 22   Ht 5' (1.524 m)   Wt 238 lb (108 kg)   SpO2 93%   BMI 46.48 kg/m²   CONSTITUTIONAL:  awake, cooperative, and no apparent distress  EYES:  extra-ocular muscles intact  ENT:  normocepalic, without obvious abnormality  NECK:  supple, symmetrical, trachea midline  LUNGS:  no increased work of breathing, no retractions, and clear to auscultation  CARDIOVASCULAR:  normal apical pulses and normal S1 and S2  ABDOMEN:  normal bowel sounds, non-distended, and non-tender  MUSCULOSKELETAL:  full range of motion noted  NEUROLOGIC:  Mental Status Exam:  Level of Alertness:   awake  Orientation:   person, place, time  SKIN:  no bruising or bleeding  Data    CBC:   Lab Results   Component Value Date/Time    WBC 4.5 02/08/2023 04:49 AM    RBC 3.37 02/08/2023 04:49 AM    HGB 8.3 02/08/2023 04:49 AM    HCT 30.1 02/08/2023 04:49 AM    MCV 89.3 02/08/2023 04:49 AM    MCH 24.6 02/08/2023 04:49 AM    MCHC 27.6 02/08/2023 04:49 AM    RDW 17.3 02/08/2023 04:49 AM     02/08/2023 04:49 AM    MPV 11.5 02/08/2023 04:49 AM     BMP:    Lab Results   Component Value Date/Time     02/08/2023 04:49 AM    K 3.1 02/08/2023 04:49 AM    K 4.3 02/05/2023 05:59 PM    CL 96 02/08/2023 04:49 AM    CO2 40 02/08/2023 04:49 AM    BUN 41 02/08/2023 04:49 AM    LABALBU 2.9 02/06/2023 06:47 AM    CREATININE 2.2 02/08/2023 04:49 AM    CALCIUM 8.1 02/08/2023 04:49 AM    GFRAA >60 10/16/2022 09:20 AM    LABGLOM 23 02/08/2023 04:49 AM    GLUCOSE 117 02/08/2023 04:49 AM       ASSESSMENT AND PLAN      Acute on chronic respiratory failure with hypoxia and hypercapnia: Bipap as tolerated. Monitor ABG. Palliative care encounter: continue to monitor  DM type 2 complicated with nephropathy: continue to hold Lantus due to episodes of hypoglycemia. BGL seems suitable  Hypertension Essential: episodes of hypotension. Continue to monitor. Hold meds. CKD: overall stable. Possible dailysis. Holding Eliquis in anticipation for IR Dialysis Cath placement. SCD in place. UTI: zyvox has been completed. CHF with fluid overload. Continue Bumex Drip and await further nephro recommendation. Anemia due CKD: continue to monitor. Hgb is overall stable.

## 2023-02-08 NOTE — PLAN OF CARE
Problem: Discharge Planning  Goal: Discharge to home or other facility with appropriate resources  2/7/2023 2250 by Danielle Vincent  Outcome: Progressing  2/7/2023 1830 by Ajay Salvador RN  Outcome: Progressing  Flowsheets (Taken 2/7/2023 0845)  Discharge to home or other facility with appropriate resources: Identify barriers to discharge with patient and caregiver     Problem: Respiratory - Adult  Goal: Achieves optimal ventilation and oxygenation  2/7/2023 2250 by JAE Dueñas  Outcome: Progressing  2/7/2023 1830 by Ajay Salvador RN  Outcome: Progressing  Flowsheets (Taken 2/7/2023 0845)  Achieves optimal ventilation and oxygenation:   Assess for changes in respiratory status   Assess for changes in mentation and behavior   Position to facilitate oxygenation and minimize respiratory effort   Oxygen supplementation based on oxygen saturation or arterial blood gases     Problem: Cardiovascular - Adult  Goal: Maintains optimal cardiac output and hemodynamic stability  2/7/2023 2250 by JAE Dueñas  Outcome: Progressing  2/7/2023 1830 by Ajay Salvador RN  Outcome: Progressing  Flowsheets (Taken 2/7/2023 0845)  Maintains optimal cardiac output and hemodynamic stability:   Monitor blood pressure and heart rate   Monitor urine output and notify Licensed Independent Practitioner for values outside of normal range   Assess for signs of decreased cardiac output  Goal: Absence of cardiac dysrhythmias or at baseline  [facility]2023 2[facility]  Outcome: Progressing  2/7/2023 1830 by Ajay Salvador RN  Outcome: Progressing  Flowsheets (Taken 2/7/2023 0845)  Absence of cardiac dysrhythmias or at baseline: Monitor cardiac rate and rhythm     Problem: Genitourinary - Adult  Goal: Absence of urinary retention  2/7/2023 2250 by Danielle Vincent  Outcome: Progressing  2/7/2023 1830 by Ajay Salvador RN  Outcome: Progressing  Flowsheets (Taken 2/7/2023 0845)  Absence of urinary retention:   Assess patients ability to void and empty bladder   Monitor intake/output and perform bladder scan as needed  Goal: Urinary catheter remains patent  2/7/2023 2250 by Delaney Pineda  Outcome: Progressing  2/7/2023 1830 by Sharmaine Betts RN  Outcome: Progressing  Flowsheets (Taken 2/7/2023 0845)  Urinary catheter remains patent: Assess patency of urinary catheter     Problem: Chronic Conditions and Co-morbidities  Goal: Patient's chronic conditions and co-morbidity symptoms are monitored and maintained or improved  2/7/2023 2250 by Delnaey Pineda  Outcome: Progressing  2/7/2023 1830 by Sharmaine Betts RN  Outcome: Progressing  Flowsheets (Taken 2/7/2023 0845)  Care Plan - Patient's Chronic Conditions and Co-Morbidity Symptoms are Monitored and Maintained or Improved: Monitor and assess patient's chronic conditions and comorbid symptoms for stability, deterioration, or improvement     Problem: Skin/Tissue Integrity  Goal: Absence of new skin breakdown  Description: 1. Monitor for areas of redness and/or skin breakdown  2. Assess vascular access sites hourly  3. Every 4-6 hours minimum:  Change oxygen saturation probe site  4. Every 4-6 hours:  If on nasal continuous positive airway pressure, respiratory therapy assess nares and determine need for appliance change or resting period.   2/7/2023 2250 by Delaney Pineda  Outcome: Progressing  2/7/2023 1830 by Sharmaine Betts RN  Outcome: Progressing     Problem: Safety - Adult  Goal: Free from fall injury  2/7/2023 2250 by Delaney Pineda  Outcome: Progressing  2/7/2023 1830 by Sharmaine Betts RN  Outcome: Progressing  Flowsheets (Taken 2/7/2023 1033)  Free From Fall Injury: Katlin Yuen family/caregiver on patient safety     Problem: ABCDS Injury Assessment  Goal: Absence of physical injury  2/7/2023 2250 by Delaney Pineda  Outcome: Progressing  2/7/2023 1830 by Sharmaine Betts RN  Outcome: Progressing  Flowsheets (Taken 2/7/2023 1033)  Absence of Physical Injury: Implement safety measures based on patient assessment     Problem: Pain  Goal: Verbalizes/displays adequate comfort level or baseline comfort level  2/7/2023 2250 by JAE Vidal  Outcome: Progressing  2/7/2023 1830 by Colton Hughes RN  Outcome: Progressing  Flowsheets (Taken 2/7/2023 0830)  Verbalizes/displays adequate comfort level or baseline comfort level:   Encourage patient to monitor pain and request assistance   Assess pain using appropriate pain scale   Administer analgesics based on type and severity of pain and evaluate response   Implement non-pharmacological measures as appropriate and evaluate response     Problem: Nutrition Deficit:  Goal: Optimize nutritional status  2/7/2023 2250 by Kalia Chamberlain  Outcome: Progressing  2/7/2023 1830 by Colton Hughes RN  Outcome: Progressing

## 2023-02-08 NOTE — PROGRESS NOTES
Pulmonary/Critical Care Progress Note    We are following patient for acute hypoxemic and hypercapnic respiratory failure, morbid obesity, obstructive sleep apnea, diastolic dysfunction, COPD, diabetes mellitus type 2, Parkinson's disease, HFpEF      SUBJECTIVE:  Patient is laying in bed on a nasal cannula and tolerating this quite well. She is on 2 L/min and is saturating at 93%. She is not using her BiPAP as much as she had been which probably indicates that she is feeling more comfortable. Chest x-ray yesterday showed some increase in the right pleural effusion. The patient continues to be treated aggressively by the renal service on IV piggyback Diamox and a bumetanide drip.     MEDICATIONS:   albumin human  50 g IntraVENous Once    digoxin  125 mcg IntraVENous Daily    metoprolol succinate  50 mg Oral BID    acetaZOLAMIDE (DIAMOX) IVPB  500 mg IntraVENous Q12H    [Held by provider] dilTIAZem  60 mg Oral 3 times per day    sucralfate  1 g Oral 4x Daily    rOPINIRole  1 mg Oral TID    atorvastatin  20 mg Oral Nightly    ipratropium-albuterol  1 vial Inhalation 4x Daily    [Held by provider] insulin glargine  13 Units SubCUTAneous BID    [Held by provider] LORazepam  0.5 mg Oral Nightly    insulin lispro  0-4 Units SubCUTAneous TID WC    insulin lispro  0-4 Units SubCUTAneous Nightly    budesonide  0.5 mg Nebulization BID    sodium chloride flush  10 mL IntraVENous 2 times per day    miconazole   Topical BID    arformoterol tartrate  15 mcg Nebulization BID    pantoprazole  40 mg Oral QAM AC    [Held by provider] apixaban  5 mg Oral BID    mirtazapine  7.5 mg Oral Nightly    carbidopa-levodopa  1 tablet Oral TID    sertraline  50 mg Oral Daily    montelukast  10 mg Oral Nightly      bumetanide 0.1 mg/mL infusion 0.5 mg/hr (02/07/23 1420)    sodium chloride 25 mL/hr at 02/04/23 0222    dextrose       sodium chloride, loperamide, sodium chloride flush, sodium chloride, promethazine **OR** ondansetron, polyethylene glycol, acetaminophen **OR** acetaminophen, glucose, dextrose bolus **OR** dextrose bolus, glucagon (rDNA), dextrose      REVIEW OF SYSTEMS:  Constitutional: Denies fever, weight loss, night sweats, and fatigue  Skin: Denies pigmentation, dark lesions, and rashes   HEENT: Denies hearing loss, tinnitus, ear drainage, epistaxis, sore throat, and hoarseness. Cardiovascular: Denies palpitations, chest pain, and chest pressure. Respiratory: Denies cough, dyspnea at rest, hemoptysis, apnea, and choking. Gastrointestinal: Denies nausea, vomiting, poor appetite, diarrhea, heartburn or reflux  Genitourinary: Denies dysuria, frequency, urgency or hematuria  Musculoskeletal: Denies myalgias, muscle weakness, and bone pain  Neurological: Denies dizziness, vertigo, headache, and focal weakness  Psychological: Denies anxiety and depression  Endocrine: Denies heat intolerance and cold intolerance  Hematopoietic/Lymphatic: Denies bleeding problems and blood transfusions    OBJECTIVE:  Vitals:    02/08/23 1331   BP:    Pulse:    Resp: 22   Temp:    SpO2:      FiO2 : 30 %  O2 Flow Rate (L/min): 3 L/min  O2 Device: Nasal cannula    PHYSICAL EXAM:  Constitutional: No fever, chills, diaphoresis  Skin: Venous stasis changes lower extremities  HEENT: Mucous membranes are dry  Neck: No JVD, lymphadenopathy, thyromegaly  Cardiovascular: S1, S2 irregular. No S3 or rubs present  Respiratory: Decreased breath sounds right lower lung field, normal breath sounds on left  Gastrointestinal: Soft, obese, nontender  Genitourinary: No grossly bloody urine  Extremities: No clubbing, cyanosis, or edema  Neurological: Awake, alert, oriented x3. No evidence of focal motor or sensory deficits  Psychological: Appropriate affect at times but not always.     LABS:  WBC   Date Value Ref Range Status   02/08/2023 4.5 4.5 - 11.5 E9/L Final   02/06/2023 6.1 4.5 - 11.5 E9/L Final   01/30/2023 7.2 4.5 - 11.5 E9/L Final     Hemoglobin   Date Value Ref Range Status   02/08/2023 8.3 (L) 11.5 - 15.5 g/dL Final   02/06/2023 7.8 (L) 11.5 - 15.5 g/dL Final   01/30/2023 7.7 (L) 11.5 - 15.5 g/dL Final     Hematocrit   Date Value Ref Range Status   02/08/2023 30.1 (L) 34.0 - 48.0 % Final   02/06/2023 27.5 (L) 34.0 - 48.0 % Final   01/30/2023 26.6 (L) 34.0 - 48.0 % Final     MCV   Date Value Ref Range Status   02/08/2023 89.3 80.0 - 99.9 fL Final   02/06/2023 88.1 80.0 - 99.9 fL Final   01/30/2023 89.3 80.0 - 99.9 fL Final     Platelets   Date Value Ref Range Status   02/08/2023 159 130 - 450 E9/L Final   02/06/2023 135 130 - 450 E9/L Final   01/30/2023 75 (L) 130 - 450 E9/L Final     Sodium   Date Value Ref Range Status   02/08/2023 146 132 - 146 mmol/L Final   02/07/2023 146 132 - 146 mmol/L Final   02/06/2023 141 132 - 146 mmol/L Final     Potassium   Date Value Ref Range Status   02/08/2023 3.1 (L) 3.5 - 5.0 mmol/L Final   02/07/2023 3.8 3.5 - 5.0 mmol/L Final   02/06/2023 3.2 (L) 3.5 - 5.0 mmol/L Final     Potassium reflex Magnesium   Date Value Ref Range Status   02/05/2023 4.3 3.5 - 5.0 mmol/L Final   02/01/2023 3.2 (L) 3.5 - 5.0 mmol/L Final   01/20/2023 4.8 3.5 - 5.0 mmol/L Final     Chloride   Date Value Ref Range Status   02/08/2023 96 (L) 98 - 107 mmol/L Final   02/07/2023 97 (L) 98 - 107 mmol/L Final   02/06/2023 94 (L) 98 - 107 mmol/L Final     CO2   Date Value Ref Range Status   02/08/2023 40 (H) 22 - 29 mmol/L Final   02/07/2023 39 (H) 22 - 29 mmol/L Final   02/06/2023 42 (HH) 22 - 29 mmol/L Final     BUN   Date Value Ref Range Status   02/08/2023 41 (H) 6 - 23 mg/dL Final   02/07/2023 43 (H) 6 - 23 mg/dL Final   02/06/2023 44 (H) 6 - 23 mg/dL Final     Creatinine   Date Value Ref Range Status   02/08/2023 2.2 (H) 0.5 - 1.0 mg/dL Final   02/07/2023 2.2 (H) 0.5 - 1.0 mg/dL Final   02/06/2023 2.3 (H) 0.5 - 1.0 mg/dL Final     Glucose   Date Value Ref Range Status   02/08/2023 117 (H) 74 - 99 mg/dL Final   02/07/2023 144 (H) 74 - 99 mg/dL Final 02/06/2023 117 (H) 74 - 99 mg/dL Final     Calcium   Date Value Ref Range Status   02/08/2023 8.1 (L) 8.6 - 10.2 mg/dL Final   02/07/2023 8.2 (L) 8.6 - 10.2 mg/dL Final   02/06/2023 8.3 (L) 8.6 - 10.2 mg/dL Final     Total Protein   Date Value Ref Range Status   02/06/2023 5.0 (L) 6.4 - 8.3 g/dL Final   02/05/2023 5.2 (L) 6.4 - 8.3 g/dL Final   01/30/2023 5.0 (L) 6.4 - 8.3 g/dL Final     Albumin   Date Value Ref Range Status   02/06/2023 2.9 (L) 3.5 - 5.2 g/dL Final   02/05/2023 3.0 (L) 3.5 - 5.2 g/dL Final   01/30/2023 3.0 (L) 3.5 - 5.2 g/dL Final     Total Bilirubin   Date Value Ref Range Status   02/06/2023 0.4 0.0 - 1.2 mg/dL Final   02/05/2023 0.3 0.0 - 1.2 mg/dL Final   01/30/2023 0.3 0.0 - 1.2 mg/dL Final     Alkaline Phosphatase   Date Value Ref Range Status   02/06/2023 60 35 - 104 U/L Final   02/05/2023 61 35 - 104 U/L Final   01/30/2023 60 35 - 104 U/L Final     AST   Date Value Ref Range Status   02/06/2023 7 0 - 31 U/L Final   02/05/2023 8 0 - 31 U/L Final   01/30/2023 8 0 - 31 U/L Final     ALT   Date Value Ref Range Status   02/06/2023 <5 0 - 32 U/L Final   02/05/2023 <5 0 - 32 U/L Final   01/30/2023 <5 0 - 32 U/L Final     Est, Glom Filt Rate   Date Value Ref Range Status   02/08/2023 23 >=60 mL/min/1.73 Final     Comment:     Pediatric calculator link  [email or web address]  Effective Oct 3, 2022  These results are not intended for use in patients  <25years of age. eGFR results are calculated without  a race factor using the 2021 CKD-EPI equation. Careful  clinical correlation is recommended, particularly when  comparing to results calculated using previous equations. The CKD-EPI equation is less accurate in patients with  extremes of muscle mass, extra-renal metabolism of  creatinine, excessive creatinine ingestion, or following  therapy that affects renal tubular secretion.      02/07/2023 23 >=60 mL/min/1.73 Final     Comment:     Pediatric calculator [email or web address]  Effective Oct 3, 2022  These results are not intended for use in patients  <25years of age. eGFR results are calculated without  a race factor using the 2021 CKD-EPI equation. Careful  clinical correlation is recommended, particularly when  comparing to results calculated using previous equations. The CKD-EPI equation is less accurate in patients with  extremes of muscle mass, extra-renal metabolism of  creatinine, excessive creatinine ingestion, or following  therapy that affects renal tubular secretion. 02/06/2023 22 >=60 mL/min/1.73 Final     Comment:     Pediatric calculator link  [email or web address]  Effective Oct 3, 2022  These results are not intended for use in patients  <25years of age. eGFR results are calculated without  a race factor using the 2021 CKD-EPI equation. Careful  clinical correlation is recommended, particularly when  comparing to results calculated using previous equations. The CKD-EPI equation is less accurate in patients with  extremes of muscle mass, extra-renal metabolism of  creatinine, excessive creatinine ingestion, or following  therapy that affects renal tubular secretion.        GFR    Date Value Ref Range Status   10/16/2022 >60  Final   10/14/2022 59  Final   10/13/2022 >60  Final     Magnesium   Date Value Ref Range Status   02/08/2023 1.6 1.6 - 2.6 mg/dL Final   02/07/2023 1.6 1.6 - 2.6 mg/dL Final   02/06/2023 1.5 (L) 1.6 - 2.6 mg/dL Final     Phosphorus   Date Value Ref Range Status   02/08/2023 4.1 2.5 - 4.5 mg/dL Final   02/07/2023 4.3 2.5 - 4.5 mg/dL Final   02/06/2023 3.6 2.5 - 4.5 mg/dL Final     Recent Labs     02/06/23  1459   PH 7.266*   PO2 107.1*   PCO2 101.4*   HCO3 45.1*   BE 15.2*   O2SAT 97.1       RADIOLOGY:  XR CHEST PORTABLE   Final Result   Lesser amount of aeration of the right lung         XR CHEST PORTABLE   Final Result 1. Slight improved aeration of the right lung when compared to prior study   2. The left lung is grossly clear   3. Cardiomegaly         XR CHEST PORTABLE   Final Result   1. New opacification seen within the right lung base which could represent   partial collapse of the right lower lobe   2. Reaccumulating right pleural effusion   3. Right upper lobe airspace disease   4. There is no pneumothorax   5. The left lung is clear. XR CHEST 1 VIEW   Final Result   Improved ventilation of the right lung, status post thoracentesis with   possible tiny less than 5% right apical pneumothorax. .      Cavitary lesion in the right midlung field which may either represent a   necrotic malignancy versus pneumatosis. Consider CT scan. IR GUIDED THORACENTESIS PLEURAL   Final Result   Successful ultrasound guided thoracentesis. XR CHEST PORTABLE   Final Result   Complete opacification of the right hemithorax related to pleural effusion. Modest contralateral infiltrate and or atelectasis at the left lower chest.         CT ABDOMEN PELVIS WO CONTRAST Additional Contrast? None   Final Result   No nephroureterolithiasis or hydronephrosis. Splenomegaly. Small volume of perihepatic ascites. At least moderate-sized partially imaged right pleural effusion with   bibasilar atelectasis. Several locules of gas which appear to be within the endometrium at the level   of the uterine fundus, of uncertain etiology or clinical significance. Please correlate clinically. Diffuse anasarca throughout the soft tissues. Cardiomegaly. XR CHEST PORTABLE   Final Result   Stable right pleural effusion.          IR INTERVENTIONAL RADIOLOGY PROCEDURE REQUEST    (Results Pending)           PROBLEM LIST:  Principal Problem:    Acute on chronic respiratory failure with hypoxia and hypercapnia (HCC)  Active Problems:    Palliative care encounter  Resolved Problems:    * No resolved hospital problems. *      IMPRESSION:  Right pleural effusion, possibly increasing  Severe diastolic dysfunction  Acute kidney injury about the same as yesterday  Cor pulmonale  Obstructive sleep apnea  Acute superimposed on chronic hypoxemic and hypercapnic respiratory failure  Hypokalemia    PLAN:  Potassium replacement per nephrology  Chest x-ray in a.m.   Diuretics per nephrology  Continue to wean off BiPAP except for nighttime  Wean FiO2 as able    Electronically signed by Anayeli Naik MD on 2/8/2023 at 3:33 PM

## 2023-02-08 NOTE — PROGRESS NOTES
Pulmonary/Critical Care Progress Note    We are following patient for acute respiratory failure, persistent but slightly improved right pleural effusion since admission, elevated BUN and creatinine, diastolic dysfunction, COPD, diabetes mellitus type 2, morbid obesity, obstructive sleep apnea, Parkinson's disease. SUBJECTIVE:  The patient continues to to use her BiPAP and her FiO2 has been titrated down to 30% with an SPO2 of 94 to 96%. Her right pleural effusion remains persistent but slightly improved since admission. She is to have a tunneled dialysis catheter placed per nephrology. We will repeat chest x-ray in the morning and she may need a thoracentesis or Pleurx catheter placed due to the persistent pleural effusion.     MEDICATIONS:   digoxin  125 mcg IntraVENous Daily    metoprolol succinate  50 mg Oral BID    acetaZOLAMIDE (DIAMOX) IVPB  500 mg IntraVENous Q12H    [Held by provider] dilTIAZem  60 mg Oral 3 times per day    sucralfate  1 g Oral 4x Daily    rOPINIRole  1 mg Oral TID    atorvastatin  20 mg Oral Nightly    ipratropium-albuterol  1 vial Inhalation 4x Daily    [Held by provider] insulin glargine  13 Units SubCUTAneous BID    [Held by provider] LORazepam  0.5 mg Oral Nightly    insulin lispro  0-4 Units SubCUTAneous TID WC    insulin lispro  0-4 Units SubCUTAneous Nightly    budesonide  0.5 mg Nebulization BID    sodium chloride flush  10 mL IntraVENous 2 times per day    miconazole   Topical BID    arformoterol tartrate  15 mcg Nebulization BID    pantoprazole  40 mg Oral QAM AC    [Held by provider] apixaban  5 mg Oral BID    mirtazapine  7.5 mg Oral Nightly    carbidopa-levodopa  1 tablet Oral TID    sertraline  50 mg Oral Daily    montelukast  10 mg Oral Nightly      bumetanide 0.1 mg/mL infusion 0.5 mg/hr (02/07/23 1420)    sodium chloride 25 mL/hr at 02/04/23 0222    dextrose       sodium chloride, loperamide, sodium chloride flush, sodium chloride, promethazine **OR** ondansetron, polyethylene glycol, acetaminophen **OR** acetaminophen, glucose, dextrose bolus **OR** dextrose bolus, glucagon (rDNA), dextrose      REVIEW OF SYSTEMS:  Constitutional: Denies fever, weight loss, night sweats, and fatigue  Skin: Denies pigmentation, dark lesions, and rashes   HEENT: Denies hearing loss, tinnitus, ear drainage, epistaxis  Cardiovascular: Denies palpitations, chest pain, and chest pressure.   Respiratory: Denies cough, dyspnea at rest, hemoptysis, apnea  Gastrointestinal: Denies nausea, vomiting, poor appetite, diarrhea  Genitourinary: Denies dysuria, frequency, urgency or hematuria  Musculoskeletal: Denies myalgias, muscle weakness, and bone pain  Neurological: Denies dizziness, vertigo, headache, and focal weakness  Psychological: Denies anxiety and depression  Endocrine: Denies heat intolerance and cold intolerance  Hematopoietic/Lymphatic: Denies bleeding problems and blood transfusions    OBJECTIVE:  Vitals:    02/07/23 1943   BP: (!) 94/57   Pulse: 81   Resp: 20   Temp: 98 °F (36.7 °C)   SpO2: 96%     FiO2 : 35 %  O2 Flow Rate (L/min): 3 L/min  O2 Device: PAP (positive airway pressure)    PHYSICAL EXAM:  Constitutional: No fever chills or diaphoresis  Skin: Warm and dry no rash or breakdown, vascular discoloration bilateral lower extremities  HEENT: Unremarkable  Neck: No JVD thyroidomegaly  Cardiovascular: S1, S2, regular rate and rhythm no rubs gallops or murmurs  Respiratory: Diminished bilateral right greater than left expiratory crackles noted on right base  Gastrointestinal: Obese soft nontender  Genitourinary: No CVA tenderness  Extremities: Edema noted bilateral lower extremities peripheral pulses palpable  Neurological: Alert and oriented x3 without focal deficit  Psychological: Alert and pleasant    LABS:  WBC   Date Value Ref Range Status   02/06/2023 6.1 4.5 - 11.5 E9/L Final   01/30/2023 7.2 4.5 - 11.5 E9/L Final   01/26/2023 10.6 4.5 - 11.5 E9/L Final     Hemoglobin   Date Value Ref Range Status   02/06/2023 7.8 (L) 11.5 - 15.5 g/dL Final   01/30/2023 7.7 (L) 11.5 - 15.5 g/dL Final   01/26/2023 9.2 (L) 11.5 - 15.5 g/dL Final     Hematocrit   Date Value Ref Range Status   02/06/2023 27.5 (L) 34.0 - 48.0 % Final   01/30/2023 26.6 (L) 34.0 - 48.0 % Final   01/26/2023 34.6 34.0 - 48.0 % Final     MCV   Date Value Ref Range Status   02/06/2023 88.1 80.0 - 99.9 fL Final   01/30/2023 89.3 80.0 - 99.9 fL Final   01/26/2023 94.3 80.0 - 99.9 fL Final     Platelets   Date Value Ref Range Status   02/06/2023 135 130 - 450 E9/L Final   01/30/2023 75 (L) 130 - 450 E9/L Final   01/26/2023 169 130 - 450 E9/L Final     Sodium   Date Value Ref Range Status   02/07/2023 146 132 - 146 mmol/L Final   02/06/2023 141 132 - 146 mmol/L Final   02/05/2023 140 132 - 146 mmol/L Final     Potassium   Date Value Ref Range Status   02/07/2023 3.8 3.5 - 5.0 mmol/L Final   02/06/2023 3.2 (L) 3.5 - 5.0 mmol/L Final   02/04/2023 3.4 (L) 3.5 - 5.0 mmol/L Final     Potassium reflex Magnesium   Date Value Ref Range Status   02/05/2023 4.3 3.5 - 5.0 mmol/L Final   02/01/2023 3.2 (L) 3.5 - 5.0 mmol/L Final   01/20/2023 4.8 3.5 - 5.0 mmol/L Final     Chloride   Date Value Ref Range Status   02/07/2023 97 (L) 98 - 107 mmol/L Final   02/06/2023 94 (L) 98 - 107 mmol/L Final   02/05/2023 94 (L) 98 - 107 mmol/L Final     CO2   Date Value Ref Range Status   02/07/2023 39 (H) 22 - 29 mmol/L Final   02/06/2023 42 (HH) 22 - 29 mmol/L Final   02/05/2023 37 (H) 22 - 29 mmol/L Final     BUN   Date Value Ref Range Status   02/07/2023 43 (H) 6 - 23 mg/dL Final   02/06/2023 44 (H) 6 - 23 mg/dL Final   02/05/2023 46 (H) 6 - 23 mg/dL Final     Creatinine   Date Value Ref Range Status   02/07/2023 2.2 (H) 0.5 - 1.0 mg/dL Final   02/06/2023 2.3 (H) 0.5 - 1.0 mg/dL Final   02/05/2023 2.3 (H) 0.5 - 1.0 mg/dL Final     Glucose   Date Value Ref Range Status   02/07/2023 144 (H) 74 - 99 mg/dL Final   02/06/2023 117 (H) 74 - 99 mg/dL Final   02/05/2023 125 (H) 74 - 99 mg/dL Final     Calcium   Date Value Ref Range Status   02/07/2023 8.2 (L) 8.6 - 10.2 mg/dL Final   02/06/2023 8.3 (L) 8.6 - 10.2 mg/dL Final   02/05/2023 8.1 (L) 8.6 - 10.2 mg/dL Final     Total Protein   Date Value Ref Range Status   02/06/2023 5.0 (L) 6.4 - 8.3 g/dL Final   02/05/2023 5.2 (L) 6.4 - 8.3 g/dL Final   01/30/2023 5.0 (L) 6.4 - 8.3 g/dL Final     Albumin   Date Value Ref Range Status   02/06/2023 2.9 (L) 3.5 - 5.2 g/dL Final   02/05/2023 3.0 (L) 3.5 - 5.2 g/dL Final   01/30/2023 3.0 (L) 3.5 - 5.2 g/dL Final     Total Bilirubin   Date Value Ref Range Status   02/06/2023 0.4 0.0 - 1.2 mg/dL Final   02/05/2023 0.3 0.0 - 1.2 mg/dL Final   01/30/2023 0.3 0.0 - 1.2 mg/dL Final     Alkaline Phosphatase   Date Value Ref Range Status   02/06/2023 60 35 - 104 U/L Final   02/05/2023 61 35 - 104 U/L Final   01/30/2023 60 35 - 104 U/L Final     AST   Date Value Ref Range Status   02/06/2023 7 0 - 31 U/L Final   02/05/2023 8 0 - 31 U/L Final   01/30/2023 8 0 - 31 U/L Final     ALT   Date Value Ref Range Status   02/06/2023 <5 0 - 32 U/L Final   02/05/2023 <5 0 - 32 U/L Final   01/30/2023 <5 0 - 32 U/L Final     Est, Glom Filt Rate   Date Value Ref Range Status   02/07/2023 23 >=60 mL/min/1.73 Final     Comment:     Pediatric calculator link  [email or web address]/gfr_calculatorped  Effective Oct 3, 2022  These results are not intended for use in patients  <25years of age. eGFR results are calculated without  a race factor using the 2021 CKD-EPI equation. Careful  clinical correlation is recommended, particularly when  comparing to results calculated using previous equations. The CKD-EPI equation is less accurate in patients with  extremes of muscle mass, extra-renal metabolism of  creatinine, excessive creatinine ingestion, or following  therapy that affects renal tubular secretion.      02/06/2023 22 >=60 mL/min/1.73 Final     Comment:     Pediatric calculator alia [email or web address]. [email or web address]/[email or web address]_[email or web address]  Effective Oct 3, 2022  These results are not intended for use in patients  <25years of age. eGFR results are calculated without  a race factor using the 2021 CKD-EPI equation. Careful  clinical correlation is recommended, particularly when  comparing to results calculated using previous equations. The CKD-EPI equation is less accurate in patients with  extremes of muscle mass, extra-renal metabolism of  creatinine, excessive creatinine ingestion, or following  therapy that affects renal tubular secretion. 02/05/2023 22 >=60 mL/min/1.73 Final     Comment:     Pediatric calculator link  [email or web address]. [email or web address]/[facility] [email or web address] [email or web address]_[email or web address]  Effective Oct 3, 2022  These results are not intended for use in patients  <25years of age. eGFR results are calculated without  a race factor using the 2021 CKD-EPI equation. Careful  clinical correlation is recommended, particularly when  comparing to results calculated using previous equations. The CKD-EPI equation is less accurate in patients with  extremes of muscle mass, extra-renal metabolism of  creatinine, excessive creatinine ingestion, or following  therapy that affects renal tubular secretion.        GFR    Date Value Ref Range Status   10/16/2022 >60  Final   10/14/2022 59  Final   10/13/2022 >60  Final     Magnesium   Date Value Ref Range Status   02/07/2023 1.6 1.6 - 2.6 mg/dL Final   02/06/2023 1.5 (L) 1.6 - 2.6 mg/dL Final   02/01/2023 1.6 1.6 - 2.6 mg/dL Final     Phosphorus   Date Value Ref Range Status   02/07/2023 4.3 2.5 - 4.5 mg/dL Final   02/06/2023 3.6 2.5 - 4.5 mg/dL Final   01/31/2023 4.5 2.5 - 4.5 mg/dL Final     Recent Labs     02/06/23  1459   PH 7.266*   PO2 107.1*   PCO2 101.4*   HCO3 45.1*   BE 15.2*   O2SAT 97.1       RADIOLOGY:  XR CHEST PORTABLE   Final Result   Lesser amount of aeration of the right lung         XR CHEST PORTABLE   Final Result 1. Slight improved aeration of the right lung when compared to prior study   2. The left lung is grossly clear   3. Cardiomegaly         XR CHEST PORTABLE   Final Result   1. New opacification seen within the right lung base which could represent   partial collapse of the right lower lobe   2. Reaccumulating right pleural effusion   3. Right upper lobe airspace disease   4. There is no pneumothorax   5. The left lung is clear. XR CHEST 1 VIEW   Final Result   Improved ventilation of the right lung, status post thoracentesis with   possible tiny less than 5% right apical pneumothorax. .      Cavitary lesion in the right midlung field which may either represent a   necrotic malignancy versus pneumatosis. Consider CT scan. IR GUIDED THORACENTESIS PLEURAL   Final Result   Successful ultrasound guided thoracentesis. XR CHEST PORTABLE   Final Result   Complete opacification of the right hemithorax related to pleural effusion. Modest contralateral infiltrate and or atelectasis at the left lower chest.         CT ABDOMEN PELVIS WO CONTRAST Additional Contrast? None   Final Result   No nephroureterolithiasis or hydronephrosis. Splenomegaly. Small volume of perihepatic ascites. At least moderate-sized partially imaged right pleural effusion with   bibasilar atelectasis. Several locules of gas which appear to be within the endometrium at the level   of the uterine fundus, of uncertain etiology or clinical significance. Please correlate clinically. Diffuse anasarca throughout the soft tissues. Cardiomegaly. XR CHEST PORTABLE   Final Result   Stable right pleural effusion.          IR Interventional Radiology Procedure Request    (Results Pending)           PROBLEM LIST:  Principal Problem:    Acute on chronic respiratory failure with hypoxia and hypercapnia ([address]  Active Problems:    Palliative care encounter  Resolved Problems:    * No resolved hospital problems.  *      IMPRESSION:  Right pleural effusion  Severe diastolic dysfunction  Acute kidney injury  Cor pulmonale  Acute on chronic hypoxemic and hypercapnic respiratory failure  Obstructive sleep apnea on BiPAP    PLAN:  Reduce FiO2 30% maintain saturations between 88 and 92%  Diuretics and dialysis per nephrology  Repeat chest x-ray in the morning evaluate need for possible thoracentesis  Wean off BiPAP during the day      Electronically signed by RASHARD Mayfield CNP on 2/7/2023 at 8:17 PM

## 2023-02-08 NOTE — FLOWSHEET NOTE
02/07/23 1959   Treatment Team Notification   Reason for Communication Medication concern  (Bumex running w/no parameters and BP of 94/57)   Team Member Name Dr. Binu Cabrera Attending Provider   Method of Communication Call   Response Other (Comment); No new orders  (continue adminstration)

## 2023-02-08 NOTE — PROGRESS NOTES
INPATIENT CARDIOLOGY Follow UP    Name: Brandin Coe    Age: 67 y.o. Date of Admission: 1/17/2023  5:16 PM    Date of Service: 2/8/2023      Referring Physician: Jackson Brewer MD      Subjective:  No significant event overnight. No fever or chest pain or shortness of breath. Past Medical History:  Past Medical History:   Diagnosis Date    A-fib ([facility] [address]     Acute on chronic congestive heart failure (HCC)     Anxiety     Asthma     CAD (coronary artery disease) 01/21/2016    Cancer ([facility] [address]  breast ca 2006    right lumpectomy    Chronic kidney disease     nephrolithiasis    COPD exacerbation ([facility] 10/12/2022    Depression     Diabetes mellitus ([facility] [facility]     H/O mammogram     Hx MRSA infection     toe infection january 2012    Hyperlipidemia     Hypertension     Lateral epicondylitis     Morbid obesity (HCC)     SERENA on CPAP     Oxygen dependent     Parkinson's disease ([facility] [address]     Tubal ligation status        Past Surgical History:  Past Surgical History:   Procedure Laterality Date    BREAST LUMPECTOMY      BREAST REDUCTION SURGERY      CARDIAC CATHETERIZATION  4/28/2014    Dr. Dee Dee Arrington  01/11/2022    Dr. Rocky Botello  7/29/15    CT GUIDED CHEST TUBE  7/8/2022    CT GUIDED CHEST TUBE 7/8/2022 Dina Sharp MD [facility]    ENDOSCOPY, COLON, DIAGNOSTIC  7/19/15    GALLBLADDER SURGERY      LUMBAR LAMINECTOMY      TOE AMPUTATION      TONSILLECTOMY      UPPER GASTROINTESTINAL ENDOSCOPY      UPPER GASTROINTESTINAL ENDOSCOPY N/A 7/19/2022    EGD ESOPHAGOGASTRODUODENOSCOPY performed by Eric Mccormick MD at [facility] ENDOSCOPY       Family History:  Family History   Problem Relation Age of Onset    Cancer Mother         Lung Ca    Cancer Father         lung Ca    Diabetes Sister     Heart Disease Sister     Seizures Son     Other Brother         sepsis       Social History:  Social History     Socioeconomic History    Marital status:       Spouse name: Not on file Number of children: Not on file    Years of education: Not on file    Highest education level: Not on file   Occupational History    Not on file   Tobacco Use    Smoking status: Never    Smokeless tobacco: Never   Vaping Use    Vaping Use: Never used   Substance and Sexual Activity    Alcohol use: No    Drug use: No    Sexual activity: Never   Other Topics Concern    Not on file   Social History Narrative    Not on file     Social Determinants of Health     Financial Resource Strain: Not on file   Food Insecurity: Not on file   Transportation Needs: Not on file   Physical Activity: Not on file   Stress: Not on file   Social Connections: Not on file   Intimate Partner Violence: Not on file   Housing Stability: Not on file       Allergies: Allergies   Allergen Reactions    Ciprofloxacin Nausea And Vomiting    Codeine Itching     Creepy crawly \" feelings    Digoxin And Related Other (See Comments)     History of Digoxin toxicity       Home Medications:  Prior to Admission medications    Medication Sig Start Date End Date Taking? Authorizing Provider   metoprolol succinate (TOPROL XL) 25 MG extended release tablet Take 25 mg by mouth in the morning and at bedtime   Yes Historical Provider, MD   BiPAP Machine MISC by Does not apply route Nightly.  12/6 30% PER NURSING HOME PAPERWORK    Historical Provider, MD   glucagon, rDNA, 1 MG injection Inject 1 mg into the muscle as needed for Low blood sugar    Historical Provider, MD   Glucose (TRUEPLUS) 15 GM/32ML GEL Take 15 g by mouth as needed    Historical Provider, MD   hydrOXYzine pamoate (VISTARIL) 25 MG capsule Take 25 mg by mouth 3 times daily as needed for Itching    Historical Provider, MD   acetaminophen (TYLENOL) 325 MG tablet Take 650 mg by mouth every 4 hours as needed for Pain    Historical Provider, MD   apixaban (ELIQUIS) 5 MG TABS tablet Take 1 tablet by mouth 2 times daily 12/24/22   Cecilia Chaney DO   bumetanide (BUMEX) 2 MG tablet Take 1 tablet by mouth daily 12/25/22   Minot Butter, DO   midodrine (PROAMATINE) 5 MG tablet Take 1 tablet by mouth 3 times daily (with meals) 12/25/22   Jennifer Butter, DO   LORazepam (ATIVAN) 0.5 MG tablet Take 0.5 mg by mouth nightly.     Historical Provider, MD   insulin lispro (HUMALOG) 100 UNIT/ML injection vial Inject into the skin 3 times daily (before meals) Sliding scale  200-249=2 UNITS  250-299=4 UNITS  300-349=6 UNITS  350-399=8 UNITS  400-500=10 UNITS    Historical Provider, MD   sertraline (ZOLOFT) 50 MG tablet Take 50 mg by mouth daily    Historical Provider, MD   loperamide (IMODIUM) 2 MG capsule Take 2 mg by mouth 4 times daily as needed for Diarrhea    Historical Provider, MD   OXYGEN Inhale 3 L into the lungs continuous    Historical Provider, MD   docusate sodium (COLACE, DULCOLAX) 100 MG CAPS Take 100 mg by mouth 2 times daily as needed for Constipation 9/8/22   Georgie Stewart MD   insulin glargine (LANTUS) 100 UNIT/ML injection vial Inject 13 Units into the skin 2 times daily 9/8/22   Georgie Stewart MD   atorvastatin (LIPITOR) 20 MG tablet Take 20 mg by mouth nightly    Historical Provider, MD   benzoin compound solution Apply 1 Applicatorful topically nightly Apply topically to right toe  Patient not taking: No sig reported    Historical Provider, MD   ipratropium-albuterol (DUONEB) 0.5-2.5 (3) MG/3ML SOLN nebulizer solution Inhale 1 vial into the lungs 4 times daily    Historical Provider, MD   miconazole (MICOTIN) 2 % powder Apply 1 each topically 2 times daily Apply topically under breasts twice daily    Historical Provider, MD   pantoprazole (PROTONIX) 40 MG tablet Take 40 mg by mouth every morning    Historical Provider, MD   mirtazapine (REMERON) 15 MG tablet Take 7.5 mg by mouth nightly    Historical Provider, MD   budesonide-formoterol (SYMBICORT) 160-4.5 MCG/ACT AERO Inhale 2 puffs into the lungs 2 times daily    Historical Provider, MD   metoprolol tartrate (LOPRESSOR) 25 MG tablet Take 0.5 tablets by mouth in the morning and 0.5 tablets before bedtime. 7/27/22 9/8/22  Romana Barcelona, MD   carbidopa-levodopa (SINEMET CR)  MG per extended release tablet Take 2 tablets by mouth in the morning and 2 tablets at noon and 2 tablets in the evening. 7/23/22   Romana Barcelona, MD   rOPINIRole (REQUIP) 1 MG tablet Take 1 tablet by mouth in the morning and 1 tablet at noon and 1 tablet before bedtime. 7/23/22   Romana Barcelona, MD   amiodarone (CORDARONE) 200 MG tablet Take 1 tablet by mouth in the morning. 7/24/22 9/8/22  Romana Barcelona, MD   budesonide (PULMICORT) 0.5 MG/2ML nebulizer suspension Take 2 mLs by nebulization in the morning and 2 mLs in the evening. 7/23/22 9/8/22  Romana Barcelona, MD   insulin glargine-[facility]) 100 UNIT/ML injection vial Inject 5 Units into the skin nightly 7/23/22 9/8/22  Romana Barcelona, MD   QUEtiapine (SEROQUEL) 25 MG tablet Take 1 tablet by mouth in the morning and 1 tablet before bedtime. 7/23/22 9/8/22  Romana Barcelona, MD   sucralfate (CARAFATE) 1 GM tablet Take 1 tablet by mouth in the morning and 1 tablet at noon and 1 tablet in the evening and 1 tablet before bedtime.  7/20/22   Leyla Gaines DO   divalproex (DEPAKOTE) 250 MG DR tablet Take 250 mg by mouth 2 times daily  7/23/22  Historical Provider, MD   ferrous sulfate (IRON 325) 325 (65 Fe) MG tablet Take 325 mg by mouth daily (with breakfast)  Patient not taking: Reported on 7/7/2022 7/23/22  Historical Provider, MD   aspirin EC 81 MG EC tablet Take 1 tablet by mouth daily 5/17/22   Eldon Pina MD   montelukast (SINGULAIR) 10 MG tablet Take 10 mg by mouth nightly    Historical Provider, MD       Current Medications:  Current Facility-Administered Medications   Medication Dose Route Frequency Provider Last Rate Last Admin    albumin human 25 % IV solution 50 g  50 g IntraVENous Once Amanda Ramirez  mL/hr at 02/08/23 1749 50 g at 02/08/23 1741 bumetanide (BUMEX) 12.5 mg in sodium chloride 0.9 % 125 mL infusion  0.5 mg/hr IntraVENous Continuous Meaghan Michaud MD 5 mL/hr at 02/08/23 1819 0.5 mg/hr at 02/08/23 1819    digoxin (LANOXIN) injection 125 mcg  125 mcg IntraVENous Daily Sherley Raymond, DO   125 mcg at 02/08/23 1134    metoprolol succinate (TOPROL XL) extended release tablet 50 mg  50 mg Oral BID Jeff Band, DO   50 mg at 02/07/23 0842    acetaZOLAMIDE (DIAMOX) 500 mg in sodium chloride 0.9 % 100 mL IVPB  500 mg IntraVENous Q12H Vladimir Gamble MD   Stopped at 02/08/23 1645    sodium chloride (OCEAN, BABY AYR) 0.65 % nasal spray 1 spray  1 spray Each Nostril PRN Lolita Ambrocio MD   1 spray at 02/08/23 1040    [Held by provider] dilTIAZem (CARDIZEM) tablet 60 mg  60 mg Oral 3 times per day Mike Perez MD   60 mg at 01/20/23 0616    sucralfate (CARAFATE) tablet 1 g  1 g Oral 4x Daily Courtney Snider MD   1 g at 02/08/23 1750    rOPINIRole (REQUIP) tablet 1 mg  1 mg Oral TID Courtney Snider MD   1 mg at 02/08/23 1517    atorvastatin (LIPITOR) tablet 20 mg  20 mg Oral Nightly Courtney Snider MD   20 mg at 02/07/23 2038    ipratropium-albuterol (DUONEB) nebulizer solution 3 mL  1 vial Inhalation 4x Daily Courtney Snider MD   3 mL at 02/08/23 1701    [Held by provider] insulin glargine (LANTUS) injection vial 13 Units  13 Units SubCUTAneous BID Courtney Snider MD   13 Units at 01/27/23 0913    loperamide (IMODIUM) capsule 2 mg  2 mg Oral 4x Daily PRN Courtney Snider MD        [Held by provider] LORazepam (ATIVAN) tablet 0.5 mg  0.5 mg Oral Nightly Courtney Snider MD   0.5 mg at 01/20/23 2317    insulin lispro (HUMALOG) injection vial 0-4 Units  0-4 Units SubCUTAneous TID WC Courtney Snider MD   1 Units at 02/04/23 1244    insulin lispro (HUMALOG) injection vial 0-4 Units  0-4 Units SubCUTAneous Nightly Courtney Snider MD   4 Units at 01/18/23 8428    budesonide (PULMICORT) nebulizer suspension 500 mcg  0.5 mg Nebulization BID Hettie Books MD Ricki   500 mcg at 02/08/23 1701    sodium chloride flush 0.9 % injection 10 mL  10 mL IntraVENous 2 times per day Marisa Schmitt MD   10 mL at 02/08/23 1040    sodium chloride flush 0.9 % injection 10 mL  10 mL IntraVENous PRN Marisa Schmitt MD        0.9 % sodium chloride infusion   IntraVENous PRN Marisa Schmitt MD 25 mL/hr at 02/04/23 0222 New Bag at 02/04/23 0222    promethazine (PHENERGAN) tablet 12.5 mg  12.5 mg Oral Q6H PRN Marisa Schmitt MD        Or    ondansetron (ZOFRAN) injection 4 mg  4 mg IntraVENous Q6H PRN Marisa Schmitt MD        polyethylene glycol (GLYCOLAX) packet 17 g  17 g Oral Daily PRN Marisa Schmitt MD        acetaminophen (TYLENOL) tablet 650 mg  650 mg Oral Q6H PRN Marisa Schmitt MD   650 mg at 02/03/23 1014    Or    acetaminophen (TYLENOL) suppository 650 mg  650 mg Rectal Q6H PRN Marisa Schmitt MD        miconazole (MICOTIN) 2 % powder   Topical BID Jaycee Haw, APRN - CNP   Given at 02/08/23 1040    arformoterol tartrate (BROVANA) nebulizer solution 15 mcg  15 mcg Nebulization BID Jaycee Haw, APRN - CNP   15 mcg at 02/08/23 1701    pantoprazole (PROTONIX) tablet 40 mg  40 mg Oral QAM AC Aaron Lowry, APRN - CNP   40 mg at 02/07/23 0503    glucose chewable tablet 16 g  4 tablet Oral PRN Jaycee Bryce, APRN - CNP        dextrose bolus 10% 125 mL  125 mL IntraVENous PRN Jaycee Haw, APRN - CNP        Or    dextrose bolus 10% 250 mL  250 mL IntraVENous PRN Jaycee Haw, APRN - CNP        glucagon (rDNA) injection 1 mg  1 mg SubCUTAneous PRN Jaycee Haw, APRN - CNP        dextrose 10 % infusion   IntraVENous Continuous PRN Jaycee Haw, APRN - CNP        [Held by provider] apixaban (ELIQUIS) tablet 5 mg  5 mg Oral BID Jaycee Haw, APRN - CNP   5 mg at 02/07/23 0843    mirtazapine (REMERON) tablet 7.5 mg  7.5 mg Oral Nightly Jaycee Bryce, APRN - CNP   7.5 mg at 02/07/23 2038    carbidopa-levodopa (SINEMET CR)  MG per extended release tablet 1 tablet 1 tablet Oral TID RASHARD Montez CNP   1 tablet at 02/08/23 1450    sertraline (ZOLOFT) tablet 50 mg  50 mg Oral Daily Jimenez Gross APRN - CNP   50 mg at 02/08/23 1302    montelukast (SINGULAIR) tablet 10 mg  10 mg Oral Nightly RASHARD Montez - CNP   10 mg at 02/07/23 2038      bumetanide 0.1 mg/mL infusion 0.5 mg/hr (02/08/23 1819)    sodium chloride 25 mL/hr at 02/04/23 0222    dextrose         Physical Exam:  /68   Pulse 87   Temp 97.5 °F (36.4 °C) (Oral)   Resp 23   Ht 5' (1.524 m)   Wt 238 lb (108 kg)   SpO2 90%   BMI 46.48 kg/m²   Wt Readings from Last 3 Encounters:   02/07/23 238 lb (108 kg)   01/16/23 259 lb (117.5 kg)   01/16/23 259 lb (117.5 kg)       Appearance: Alert and oriented x3 not in acute distress.   Skin: Dry skin  Head: Atraumatic  Eyes: Intact extraocular muscles   ENMT: Mucous membranes are moist  Neck: Supple  Lungs: Clear to auscultation  Cardiac: Normal S1 and S2  Abdomen: Protuberant  Extremities: Intact range of motion  Neurologic: No focal neurological deficits  Peripheral Pulses: 2+ peripheral pulses    Intake/Output:    Intake/Output Summary (Last 24 hours) at 2/8/2023 1828  Last data filed at 2/8/2023 1410  Gross per 24 hour   Intake --   Output 1500 ml   Net -1500 ml     I/O this shift:  In: -   Out: 800 [Urine:800]    Laboratory Tests:  Recent Labs     02/06/23  0647 02/07/23  1333 02/08/23  0449    146 146   K 3.2* 3.8 3.1*   CL 94* 97* 96*   CO2 42* 39* 40*   BUN 44* 43* 41*   CREATININE 2.3* 2.2* 2.2*   GLUCOSE 117* 144* 117*   CALCIUM 8.3* 8.2* 8.1*     Lab Results   Component Value Date/Time    MG 1.6 02/08/2023 04:49 AM    MG 1.6 02/07/2023 01:33 PM    MG 1.5 02/06/2023 06:47 AM     Recent Labs     02/06/23  0647   ALKPHOS 60   ALT <5   AST 7   PROT 5.0*   BILITOT 0.4   LABALBU 2.9*     Recent Labs     02/06/23  0648 02/08/23  0449   WBC 6.1 4.5   RBC 3.12* 3.37*   HGB 7.8* 8.3*   HCT 27.5* 30.1*   MCV 88.1 89.3   MCH 25.0* 24.6*   MCHC 28.4* 27.6*   RDW 16.8* 17.3*    159   MPV 12.3* 11.5     Lab Results   Component Value Date    CKTOTAL 25 01/18/2023    CKMB 2.1 04/25/2014    TROPONINI <0.01 10/26/2020    TROPONINI <0.01 08/24/2019    TROPONINI <0.01 05/29/2019     No results for input(s): CKTOTAL, CKMB, CKMBINDEX, TROPHS in the last 72 hours. Lab Results   Component Value Date    INR 2.1 02/03/2023    INR 1.6 02/02/2023    INR 1.9 02/01/2023    PROTIME 23.7 (H) 02/03/2023    PROTIME 18.8 (H) 02/02/2023    PROTIME 21.5 (H) 02/01/2023     Lab Results   Component Value Date    TSH 6.520 (H) 11/06/2022    TSH 8.350 (H) 09/27/2022    TSH 4.580 (H) 07/06/2022     Lab Results   Component Value Date    LABA1C 6.8 (H) 12/19/2022    LABA1C 6.1 (H) 08/24/2022    LABA1C 6.5 (H) 05/12/2022     No results found for: EAG  Lab Results   Component Value Date    CHOL 95 05/12/2022    CHOL 97 04/15/2022    CHOL 146 03/18/2022     Lab Results   Component Value Date    TRIG 93 05/12/2022    TRIG 171 (H) 04/15/2022    TRIG 134 03/18/2022     Lab Results   Component Value Date    HDL 38 05/12/2022    HDL 46 04/15/2022    HDL 50 03/18/2022     Lab Results   Component Value Date    LDLCALC 38 05/12/2022    LDLCALC 17 04/15/2022    LDLCALC 69 03/18/2022     Lab Results   Component Value Date    LABVLDL 19 05/12/2022    LABVLDL 34 04/15/2022    LABVLDL 27 03/18/2022    VLDL 84 09/19/2017     Lab Results   Component Value Date    CHOLHDLRATIO 3.3 03/13/2019    CHOLHDLRATIO 3.8 12/11/2018    CHOLHDLRATIO 3.0 09/04/2018     No results for input(s): PROBNP in the last 72 hours. Cardiac Tests:        TTE 7/7/22 Telly   Summary   Technically difficult study - limited visualization. Micro-bubble contrast injected to enhance left ventricular visualization. Normal left ventricular size and systolic function. Ejection fraction is visually estimated at 70-75%. Indeterminate diastolic function. No regional wall motion abnormalities seen.    Mild left ventricular concentric hypertrophy noted. Moderately dilated right ventricle with reduced function. Biatrial dilation. Mild tricuspid regurgitation. RVSP is at least 60 mmHg. Prominent pericardial fat pad. No definitive evidence of pericardial effusion. Stress test:    Pharm stress 1/2022  Gated SPECT left ventricular ejection fraction was calculated to be   74% with normal wall motion and thickening. TID ratio 1.08.    1.  ECG during the infusion did not change. 2.  The myocardial perfusion imaging was abnormal.   3.  The abnormality was a large sized, moderate fixed defect involving   the inferior and inferolateral wall suggestive prior infarct without   any reversibility. There was also a small sized mild perfusion defect   involving the mid to distal anterior wall suggestive ischemia. 4.  Overall left ventricular systolic function was normal without   regional wall motion abnormalities. 5.  No transient ischemic dilatation. 6.  High risk general pharmacologic stress test.      Cardiac catheterization:   [address] [place] [address] 1/11/22 Diane  CONCLUSIONS:  1. Coronary artery disease:  A. Left main. No significant disease. B.  LAD. Heavily calcified proximal and mid vessel with 50% mid vessel stenosis. 60% proximal stenosis of a moderate size first diagonal branch. C.  LCX. 50% ostial narrowing of the AV groove branch in the mid vessel which is a bifurcation lesion with a large marginal branch which itself did not appear to have any significant disease. The AV groove branch of  the left circumflex continues to provide a posterior descending artery branch and the posterolateral branch (codominant vessel). D.  RCA. Codominant vessel with no significant angiographic disease. 2.  Normal left ventricular size with hyperdynamic left ventricular systolic function with an estimated ejection fraction of 75%. 3.  Systemic hypertension. 4.  Elevated left ventricular end-diastolic pressure.      Echo Summary 1/19/2023: No previous echo for comparison. Technically adequate study. Left ventricle size is normal.   Mild concentric left ventricular hypertrophy. Ejection fraction is visually estimated at 65%. No regional wall motion abnormalities seen. Indeterminate diastolic function. Right ventricle global systolic function is mildly reduced . Physiologic and/or trace mitral regurgitation is present. Moderate tricuspid regurgitation. RVSP is 47 mmHg. Pulmonary hypertension is mild to moderate . CXR reviewed: The ASCVD Risk score (Jennifer DK, et al., 2019) failed to calculate for the following reasons: The patient has a prior MI or stroke diagnosis    ASSESSMENT / PLAN:    1. Acute on chronic diastolic CHF in the setting of significant acute on chronic kidney injury:  Volume management per nephrology  Continue beta-blocker and avoid nephrotoxic agents       2. CAD: Cath 1/11/2022 with moderate non-occlusive CAD treated medically. Continue statin/BB. No ASA due to eliquis. 3. Permanent Afib:   Recurrent despite multiple cardioversion's. Continue beta-blocker and consider discontinuation of diltiazem to avoid hypotension     4. Bradycardia issues:   Monitor closely on telemetry. Discontinue diltiazem  Decrease metoprolol dose if persistent bradycardia    5. HTN:   Monitor closely and hold blood pressure meds if hypotensive     6. Lipids:   Continue on statin       7. DM:   Per primary service. 8. COPD/Asthma: On home oxygen. 9. Acute on CKD:   Nephrology following. 10. Anemia/Thrombocytopenia:  Monitor closely     11. SERENA     12. Parkinson's     13. Breast CA     14. Tobacco use                   Thank you for allowing me to participate in your patient's care. Please feel free to contact me if you have any questions or concerns.     Gonsalo Godoy MD  [facility]) Cardiology

## 2023-02-08 NOTE — PROGRESS NOTES
Comprehensive Nutrition Assessment    Type and Reason for Visit:  Reassess    Nutrition Recommendations/Plan:   Continue NPO, will monitor nutrition progression     Malnutrition Assessment:  Malnutrition Status: At risk for malnutrition (Comment) (01/20/23 1432)    Context:  Chronic Illness     Findings of the 6 clinical characteristics of malnutrition:  Energy Intake:  No significant decrease in energy intake  Weight Loss:  Unable to assess     Body Fat Loss:  No significant body fat loss     Muscle Mass Loss:  No significant muscle mass loss    Fluid Accumulation:  Unable to assess     Strength:  Not Performed    Nutrition Assessment:    Pt admit w/ SHANTANU on CKD, Acute on chronic respiratory failure d/t COPD exacerbation and PNA, edema/anasarca chronic, pulmonary HTN. On fluid restriction, IV fluid resuscitation. PMHx of CHF, COPD, CKD, CAD, breast cancer, DM, SERENA, Parkinson's ds. Note pt still in need of the bipap. Note 1/30/2023 complete opacification of the rt hemithorax r/t pleural effusion, note 2/2 s/p thoracentesis (1000cc). Pt is currently NPO pending a tunneled dialysis catheter; dialysis/ultrafiltration for fluid clearance. Will monitor nutrition progression. Nutrition Related Findings:    A/O x4, obese/soft/rounded/nontender abd, +BS, flatus, TYRON, I/O -15.4L, +2 pitting edema, elevated renal labs, K+3. 1(L) Wound Type: Wound Consult Pending (R upper thigh redness, L pretibial popped blister)       Current Nutrition Intake & Therapies:    Average Meal Intake: NPO  Average Supplements Intake: NPO  Diet NPO    Anthropometric Measures:  Height: 5' (152.4 cm)  Ideal Body Weight (IBW): 100 lbs (45 kg)    Admission Body Weight: 262 lb 2 oz (118.9 kg) (1/17 bed scale)  Current Body Weight: 238 lb (108 kg) (2/7 bed), 273.3 % IBW.  Weight Source: Bed Scale  Current BMI (kg/m2): 46.5  Usual Body Weight:  (JOHN d/t wt fluctuations in EMR)     Weight Adjustment For: No Adjustment     BMI Categories: Obese Class 3 (BMI 40.0 or greater)    Estimated Daily Nutrient Needs:  Energy Requirements Based On: Formula  Weight Used for Energy Requirements: Current  Energy (kcal/day): 1900-2000kcal/day  Weight Used for Protein Requirements: Ideal  Protein (g/day): 115-120g/day (2/5-2.7g/kg IBW)  Method Used for Fluid Requirements: 1 ml/kcal  Fluid (ml/day): per critical care (1800ml fluid restiction)    Nutrition Diagnosis:   Inadequate oral intake related to impaired respiratory function (cardiac dysfunction,renal dysfunction) as evidenced by NPO or clear liquid status due to medical condition    Nutrition Interventions:   Food and/or Nutrient Delivery: Continue NPO  Nutrition Education/Counseling: Education completed  Coordination of Nutrition Care: Continue to monitor while inpatient     Goals:     Goals: other (specify)  Specify Other Goals: monitor nutrition progression    Nutrition Monitoring and Evaluation:   Behavioral-Environmental Outcomes: None Identified  Food/Nutrient Intake Outcomes: Diet Advancement/Tolerance (when medically feasible)  Physical Signs/Symptoms Outcomes: Biochemical Data, Chewing or Swallowing, GI Status, Fluid Status or Edema, Weight, Skin, Nutrition Focused Physical Findings    Discharge Planning:     Too soon to determine     Chucho Ortega RD  Contact: [phone]

## 2023-02-08 NOTE — PROGRESS NOTES
Physical Therapy  Physical Therapy Treatment Note/Plan of Care    Room #:  4582/6062-24  Patient Name: Beatriz Carpenter  YOB: 1950  MRN: [id number]    Date of Service: 2/8/2023     Tentative placement recommendation: Subacute Rehab  Equipment recommendation:  To be determined      Evaluating Physical Therapist: Branden Kaplan PT, DPT #[id number]      Specific Provider Orders/Date/Referring Provider :     01/19/23 0745    PT eval and treat  Start:  01/19/23 0745,   End:  01/19/23 0745,   ONE TIME,   Standing Count:  1 Occurrences,   Vandana Hoffman MD Acknowledge New     Admitting Diagnosis:   NSTEMI (non-ST elevated myocardial infarction) [facility]) [I21.4]  Atrial fibrillation with rapid ventricular response (HCC) [I48.91]  Recurrent right pleural effusion [J90]  Hypotension, unspecified hypotension type [I95.9]  Acute on chronic respiratory failure with hypoxia and hypercapnia (HCC) [J96.21, J96.22]      Surgery: none  Visit Diagnoses         Codes    Recurrent right pleural effusion     J90    NSTEMI (non-ST elevated myocardial infarction) ([facility]     I21.4    Postprocedural pneumothorax     J95.811            Patient Active Problem List   Diagnosis    Depression with anxiety    Osteoporosis    Asthma    Hyperlipidemia    Mitral regurgitation    Obstructive sleep apnea syndrome    Psoriasis    Diabetes mellitus ([facility] [address]    Parkinson's disease ([address]    Primary hypertension    Microalbuminuria    Morbid obesity (HCC)    RLS (restless legs syndrome)    Generalized weakness    Inability to walk    Hypothyroidism    Chest pain    Acute asthma exacerbation    Asthma exacerbation, mild    Paroxysmal atrial fibrillation (HCC)    Atrial fibrillation with rapid ventricular response (HCC)    Acute on chronic congestive heart failure ([address]    Coronary artery disease involving native coronary artery of native heart without angina pectoris    Dysphagia    Hepatosplenomegaly    Acute decompensated heart failure ([facility] [address]    Acute diastolic (congestive) heart failure (HCC)    Nonrheumatic tricuspid valve regurgitation    Tobacco abuse    Recurrent syncope    Septic shock (HCC)    Seizure-like activity (HCC)    Acute kidney injury ([facility] [address]    Pancytopenia (HCC)    Thrombocytopenia (HCC)    Encephalopathy    Acute respiratory failure with hypoxia (HCC)    Lactic acidosis    Delirium    Acute on chronic anemia    Pleural effusion, right    Acute respiratory failure with hypoxia and hypercapnia (HCC)    Acute confusion    Acute on chronic diastolic heart failure (HCC)    Macrocytosis    Other specified anemias    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[facility] [address]    Puerperal sepsis with acute hypoxic respiratory failure without septic shock (HCC)    Pulmonary HTN (HCC)    Chronic anticoagulation    RVF (right ventricular failure) (HCC)    Metabolic alkalosis    Sepsis (HCC)    COPD exacerbation (HCC)    Acute on chronic respiratory failure with hypercapnia (HCC)    Persistent atrial fibrillation (HCC)    Hypercapnic respiratory failure (HCC)    Acute on chronic respiratory failure (HCC)    Hyperkalemia    Heart failure (HCC)    Acute renal insufficiency    Hypotension    Anemia    Acute on chronic respiratory failure with hypoxia and hypercapnia (HCC)    Palliative care encounter        ASSESSMENT of Current Deficits Patient exhibits decreased strength, balance, and endurance impairing functional mobility, transfers, gait , gait distance, and tolerance to activity. Patient declined standing with max encouragement but was agreeable to sitting EOB and performing seated exercises. Pt required Max/DepA to scoot toward HOB.        PHYSICAL THERAPY  PLAN OF CARE       Physical therapy plan of care is established based on physician order,  patient diagnosis and clinical assessment    Current Treatment Recommendations:    -Bed Mobility: Lower extremity exercises  and Trunk control activities   -Sitting Balance: Incorporate reaching activities to activate trunk muscles , Hands on support to maintain midline , Facilitate active trunk muscle engagement , Facilitate postural control in all planes , and Engage in core activities to allow for movement within base of support   -Standing Balance: Perform strengthening exercises in standing to promote motor control with or without upper extremity support , Instruct patient on adequate base of support to maintain balance, and Challenge balance utilizing reaching  activities beyond center of gravity    -Transfers: Provide instruction on proper hand and foot position for adequate transfer of weight onto lower extremities and use of gait device if needed, Cues for hand placement, technique and safety. Provide stabilization to prevent fall , Facilitate weight shift forward on to lower extremities and provide necessary stabilization of bilateral lower extremities , Support transfer of weight on to lower extremities, and Assist with extension of knees trunk and hip to accept weight transfer   -Gait: Gait training, Standing activities to improve: base of support, weight shift, weight bearing , Exercises to improve trunk control, Exercises to improve hip and knee control, Performance of protected weight bearing activities, and Activities to increase weight bearing   -Endurance: Utilize Supervised activities to increase level of endurance to allow for safe functional mobility including transfers and gait  and Use graduated activities to promote good breathing techniques and provide support and education to maximize respiratory function    PT long term treatment goals are located in below grid    Patient and or family understand(s) diagnosis, prognosis, and plan of care. Frequency of treatments: Patient will be seen  3-5 times/week.          Prior Level of Function: Patient ambulated with wheeled walker for short distances  Rehab Potential: fair + for baseline    Past medical history:   Past Medical History:   Diagnosis Date A-fib ([facility] [address]     Acute on chronic congestive heart failure (HCC)     Anxiety     Asthma     CAD (coronary artery disease) 01/21/2016    Cancer ([facility]  breast ca 2006    right lumpectomy    Chronic kidney disease     nephrolithiasis    COPD exacerbation ([facility] [address] 10/12/2022    Depression     Diabetes mellitus ([facility] [address]     H/O mammogram     Hx MRSA infection     toe infection january 2012    Hyperlipidemia     Hypertension     Lateral epicondylitis     Morbid obesity (HCC)     SERENA on CPAP     Oxygen dependent     Parkinson's disease ([facility] [address]     Tubal ligation status      Past Surgical History:   Procedure Laterality Date    BREAST LUMPECTOMY      BREAST REDUCTION SURGERY      CARDIAC CATHETERIZATION  4/28/2014    Dr. Cora Hogan  01/11/2022    Dr. Nieves Delaney  7/29/15    CT GUIDED CHEST TUBE  7/8/2022    CT GUIDED CHEST TUBE 7/8/2022 Toño Wood MD [facility]    ENDOSCOPY, COLON, DIAGNOSTIC  7/19/15    GALLBLADDER SURGERY      LUMBAR LAMINECTOMY      TOE AMPUTATION      TONSILLECTOMY      UPPER GASTROINTESTINAL ENDOSCOPY      UPPER GASTROINTESTINAL ENDOSCOPY N/A 7/19/2022    EGD ESOPHAGOGASTRODUODENOSCOPY performed by Collette Hobbs MD at [address]:    Precautions:  Up with assistance, falls, O2, and morbid obesity      Social history: Patient from SNF    Equipment owned: Wheelchair, Wheeled Walker, and [id number] - Inpatient   How much help is needed turning from your back to your side while in a flat bed without using bedrails?: A Lot  How much help is needed moving from lying on your back to sitting on the side of a flat bed without using bedrails?: A Lot  How much help is needed moving to and from a bed to a chair?: Total  How much help is needed standing up from a chair using your arms?: Total  How much help is needed walking in hospital room?: Total  How much help is needed climbing 3-5 steps with a railing?: Total  AM-PAC Inpatient Mobility Raw Score : 8  AM-PAC Inpatient T-Scale Score : 28.52  Mobility Inpatient CMS 0-100% Score: 86.62  Mobility Inpatient CMS G-Code Modifier : CM    Nursing cleared patient for PT treatment. Patient c/o being tired. OBJECTIVE;   Initial Evaluation  Date: 1/19/2023 Treatment Date:  2/8/2023       Short Term/ Long Term   Goals   Was pt agreeable to Eval/treatment? Yes yes To be met in 3 days   Pain level   0/10   0/10    Bed Mobility    Rolling: Maximal assist of 1    Supine to sit: Maximal assist of 1    Sit to supine: Maximal assist of 1    Scooting: Maximal assist of 1   Rolling: Maximal assist of 1   Supine to sit: Maximal assist of 1   Sit to supine: Maximal assist of 1   Scooting: Maximal assist of  2    Rolling: Minimal assist of 1    Supine to sit: Minimal assist of 1    Sit to supine: Minimal assist of 1    Scooting: Minimal assist of 1     Transfers Sit to stand: Not assessed  d/t fair- seated balance and pt declining standing Sit to stand: Not assessed , pt declined with encouragement      Sit to stand:  Moderate assist of 1    Ambulation    not assessed  not assessed    > 15 feet using  wheeled walker with Moderate assist of 1    Stair negotiation: ascended and descended   Not assessed           ROM Within functional limits    Increase range of motion 10% of affected joints    Strength BUE:  refer to OT eval  RLE:  4-/5  LLE:  4-/5  Increase strength in affected mm groups by 1/3 grade   Balance Sitting EOB:  fair -  Dynamic Standing:  not assessed  Sitting EOB: fair   Dynamic Standing: not assessed    Sitting EOB:  fair   Dynamic Standing: fair - with [address]     Patient is Alert & Oriented x person, place, time, and situation and follows directions    Sensation:  Patient  denies numbness/tingling   Edema:  no   Endurance: poor+    Vitals:  3 liters nasal cannula   Blood Pressure at rest  Blood Pressure during session    Heart Rate at rest  Heart Rate during session SPO2 at rest %  SPO2 during session 87-95%     Patient education  Patient educated on role of Physical Therapy, risks of immobility, safety and plan of care, energy conservation,  importance of mobility while in hospital , ankle pumps, quad set and glut set for edema control, blood clot prevention, safety , and positioning for skin integrity and comfort     Patient response to education:   Pt verbalized understanding Pt demonstrated skill Pt requires further education in this area   Yes Partial Yes      Treatment:  Patient practiced and was instructed/facilitated in the following treatment: Patient performed supine BLE & BUE exercises. Therapeutic Exercises:  ankle pumps, heel raises, hip abduction/adduction, long arc quad, and seated marching 15 reps      At end of session, patient in bed with     call light and phone within reach,  all lines and tubes intact, nursing notified. Patient would benefit from continued skilled Physical Therapy to improve functional independence and quality of life.          Patient's/ family goals   rehab    Time in  1143  Time out  1214    Total Treatment Time  31 minutes      CPT codes:  Therapeutic activities (25438)   16 minutes  1 unit(s)  Therapeutic exercises (02717)   15 minutes  1 unit(s)    Ivan Aguirre PT License Number [id number]

## 2023-02-08 NOTE — CARE COORDINATION
SS NOTE: COVID NEGATIVE 1/17, PT WILL NEED A RAPID NEGATIVE COVID TEST IF SHE RETURNS TO [facility]. Pt is on 3 liters of O2 and per nursing is most comfortable on continuous bipap- setting have been changed. Pt has a peripheral line. She is on IV Bumex. She has a genao catheter. Pt to have a chest xray today. Nephrology -Dr Osiel Hill recommending pt begin HD to remove fluid that she is retaining. Holding Eliquis so pt can receive a temporary HD cath ttoday to begin HD. Per Critical care pt also needs a thoracentesis and a pleurex catheter. Cardiology, ID and Palliative care are involved. SS to continue. [facility] is still following pt. SS to continue. ESTHER Eagle.2/8/2023.11:49AM.

## 2023-02-08 NOTE — PROGRESS NOTES
[facility] MD Debi Millan MD Tanner Flakes, MD Michael Wilde, BRADLEY Devlin, NUNU Juarez CNP  Progress Note    2/7/2023    SUBJECTIVE:   1/20: Feeling little better today. Denies new complaint. Still tachypnea, though denies dyspnea no cp/palp Appetite ok ROS otherwise unremarkable    1//21 seen in her room on o2/nc bp stable weak poor po intake   2+ edema in LE     1/22 charts reviewed . On bipap    1/23 : on o2/nc . Still look hypervolemic     1/24 seen in her room comfortable o2/nc . Still with LE edema which seems to be multifactorial and will likely need to accept having some edema on board     1/25 sitting on edge of the bed working with pt . Still with considerable edema in LEs   BP stable     1/26 seen in her room reports of SOB with minimal activity , LE edema still signficant . 1/27 good UO On 5 L/o2/nc  Still with sob with activity Tachycardiac with low functional capacity     1/28: Asleep, resting and breathing comfortably on nasal cannula. Remains edematous. Ongoing fatigue and generalized weakness. 1/29: Hypotensive last evening, Bumex drip stopped, IV normal saline bolus administered to effect. Breathing little more easily though still on AVAPS. To be downgraded to CPAP shortly. Thirsty. Still markedly edematous. 1/30: Somnolent, though arousable. On CPAP. Ongoing fatigue and malaise with generalized weakness    1/31 seen in her room , skin wrinkling in LE on HFNC . BP on lower side  For thoracentesis today   Dw RN .     2/1: Seen while laying in bed, no acute distress. Tells me that she is thirsty. She is on heated high flow nasal canula. Feels that her breathing has improved slightly. Plan is for thoracentesis later this afternoon, could not be done yesterday due to elevated INR. Still with skin wrinkling to bilateral lower extremities.      2/2 1 L removed with thoracentesis   O2 requirement better on 8 L o2/nc Very weak and deconditioned   Labile BP numbers    2/2 o2/nc LE UO ok remain weak decondtioned . Edema in LE getting somewhat worse    2/4 remain with significant depdent edema deconditioned in bed     2/5: Discussed with respiratory therapist: Just put on her CPAP after having tolerated nasal cannula throughout most of the day. Remains bedbound, severely weak and debilitated. Remains massively swollen, little change in the last 5 days. 2/6: Status quo. Somnolent but arousable. Desats and dyspneic without AVAPS. Remains massively edematous    2/7:  resp status is status quo. More awake, alert, interactive today. Resumed bumex gtt yesterday. PROBLEM LIST:    Principal Problem:    Acute on chronic respiratory failure with hypoxia and hypercapnia (HCC)  Active Problems:    Palliative care encounter  Resolved Problems:    * No resolved hospital problems. *         DIET:    ADULT ORAL NUTRITION SUPPLEMENT; Dinner; Other Oral Supplement; Gelatein 20  ADULT DIET; Regular; 3 carb choices (45 gm/meal); Low Fat/Low Chol/High Fiber/2 gm Na;  Moderately Thick (Honey); 1200 ml  Diet NPO     MEDS (scheduled):    digoxin  125 mcg IntraVENous Daily    metoprolol succinate  50 mg Oral BID    acetaZOLAMIDE (DIAMOX) IVPB  500 mg IntraVENous Q12H    [Held by provider] dilTIAZem  60 mg Oral 3 times per day    sucralfate  1 g Oral 4x Daily    rOPINIRole  1 mg Oral TID    atorvastatin  20 mg Oral Nightly    ipratropium-albuterol  1 vial Inhalation 4x Daily    [Held by provider] insulin glargine  13 Units SubCUTAneous BID    [Held by provider] LORazepam  0.5 mg Oral Nightly    insulin lispro  0-4 Units SubCUTAneous TID WC    insulin lispro  0-4 Units SubCUTAneous Nightly    budesonide  0.5 mg Nebulization BID    sodium chloride flush  10 mL IntraVENous 2 times per day    miconazole   Topical BID    arformoterol tartrate  15 mcg Nebulization BID    pantoprazole  40 mg Oral QAM AC    [Held by provider] apixaban  5 mg Oral BID mirtazapine  7.5 mg Oral Nightly    carbidopa-levodopa  1 tablet Oral TID    sertraline  50 mg Oral Daily    montelukast  10 mg Oral Nightly       MEDS (infusions):   bumetanide 0.1 mg/mL infusion 0.5 mg/hr (02/07/23 1420)    sodium chloride 25 mL/hr at 02/04/23 0222    dextrose         MEDS (prn):  sodium chloride, loperamide, sodium chloride flush, sodium chloride, promethazine **OR** ondansetron, polyethylene glycol, acetaminophen **OR** acetaminophen, glucose, dextrose bolus **OR** dextrose bolus, glucagon (rDNA), dextrose    PHYSICAL EXAM:     Patient Vitals for the past 24 hrs:   BP Temp Temp src Pulse Resp SpO2 Weight   02/07/23 1807 -- -- -- -- 26 -- --   02/07/23 1346 -- -- -- -- -- 92 % --   02/07/23 1328 -- -- -- -- 21 -- --   02/07/23 1200 (!) 97/53 97.4 °F (36.3 °C) Oral 82 16 92 % --   02/07/23 1013 -- -- -- -- 20 -- --   02/07/23 1011 -- -- -- -- -- 92 % --   02/07/23 0830 124/73 98.4 °F (36.9 °C) Oral 82 18 92 % --   02/07/23 0628 -- -- -- -- 20 -- --   02/07/23 0627 -- -- -- 87 20 91 % --   02/07/23 0530 (!) 125/59 98 °F (36.7 °C) -- 90 22 92 % --   02/07/23 0500 -- -- -- -- -- -- 238 lb (108 kg)   02/07/23 0122 -- -- -- -- 21 -- --   02/06/23 2339 -- -- -- -- -- 90 % --   02/06/23 2330 -- -- -- -- -- (!) 87 % --   02/06/23 2155 -- -- -- -- 23 -- --   02/06/23 1946 -- -- -- -- 20 -- --   02/06/23 1927 113/63 97.4 °F (36.3 °C) Axillary 85 22 93 % --   @      Intake/Output Summary (Last 24 hours) at 2/7/2023 1923  Last data filed at 2/7/2023 1420  Gross per 24 hour   Intake 250 ml   Output 2200 ml   Net -1950 ml         Wt Readings from Last 3 Encounters:   02/07/23 238 lb (108 kg)   01/16/23 259 lb (117.5 kg)   01/16/23 259 lb (117.5 kg)     Age-appropriate morbidly obese white woman, though easily arousable, no apparent distress  NC/AT EOMI sclera conjunctive are clear and anicteric mucous membranes are moist  Neck soft supple trachea midline  Lungs clear to ausculation bilaterally, diminished in the bases. Poor inspiratory effort. Regular rhythm normal S1 and S2  Abdomen soft nontender nondistended normal active bowel sounds. Abdominal pannus has substantial edema  Distal extremities, thighs, sacrum have substantial chronic type nonpitting edema, +1 BLE edema, though with skin wrinkling   Pulses 1+ x4  Moves all 4 extremities  Cranial nerves II through XII grossly intact  Awake, alert, interactive and appropriate  Skin tone consistent with chronic venous stasis distally      DATA:    Recent Labs     02/06/23  0648   WBC 6.1   HGB 7.8*   HCT 27.5*   MCV 88.1            Recent Labs     02/05/23  1759 02/06/23  0647 02/07/23  1333    141 146   K 4.3 3.2* 3.8   CL 94* 94* 97*   CO2 37* 42* 39*   MG  --  1.5* 1.6   PHOS  --  3.6 4.3   BUN 46* 44* 43*   CREATININE 2.3* 2.3* 2.2*   ALT <5 <5  --    AST 8 7  --    BILITOT 0.3 0.4  --    ALKPHOS 61 60  --        Lab Results   Component Value Date    LABPROT 0.5 (H) 01/18/2023    LABPROT 0.5 01/18/2023     On CT no hydro/signs of obstruction     Urine studies  U Na 21, U Cl 23 U prot:cr ratio 0.5    ASSESSMENT / RECOMMENDATIONS:       Assessment  1. Acute kidney injury urine lytes support decrease effective volume     2. CKD stage III, Baseline creatinine 1.4 to 1.7 mg/dL, secondary to diabetic nephropathy and renal microvascular atherosclerotic disease. 0.5 g proteinuria     3. Anemia due to CKD, phlebotomy associated prolonged hospitalization     4. Acute hypercapnic and hypoxic respiratory failure due to COPD exacerbation and pneumonia. Does not appear hypervolemic. 5.  Morbid obesity, BMI 50.6 kg per metered square     6. Edema/anasarca, chronic, multifactorial, due to venous insufficiency in the setting of morbid obesity, relative immobility, likely diastolic dysfunction though it was \"indeterminate\" on echo, the does have pulmonary hypertension, mild right-sided heart failure    7. Hypotension    8.   Elevated bicarbonate represents compensation for chronic respiratory acidosis, as well as likely contraction alkalosis due to diuresis on the Bumex drip       Creatinine stable   1 L removed with thoracentesis     Remains massively hypervolemic with little improvement over the past week with Bumex drip, metolazone/Diuril, Diamox. I discussed with her using dialysis machine to ultrafiltrate --would involve placement of tunneled dialysis line, followed by daily ultrafiltration treatments of at least 4 hours, longer if we have the manpower, in effort to unload what is conservatively 25 to 30 L of extravascular water. She did not offer a definitive answer, and asked that we defer her making a decision at least until tomorrow. Ongoing tachycardia and hypotension    Recommendations  Continue diamox   Replace k   PT/OT   Nutrition support   I recommend we use ultrafiltration by dialysis line with the dialysis machine unload her massive hypervolemia which we have been unable to touch therapy to date    Overall prognosis poor with her being so deconditioned and weak     -Discussed again with her today regarding starting dialysis/ultrafiltration for fluid clearance. > 55 min in discussion w/ her, her daughter per phone, other members of the team re:  RRT to unload her massive volume overload. She expressed understanding of the r/b/a's of line placement and RRT, as did her daughter. She agrees to proceed     -cont Bumex drip  -consult IR vince TDRICHARD -- on eliquis, will need to be held, line placement prob Thurs or Fri  -daily (except Sunday) UFWOD.   May need hd at some point but for now will plan just isolated ultrafiltration 4 L per tx until at dry weight    Electronically signed by Elias Sullivan MD on 2/7/2023  > 55 min in discussion, counseling, coordination of care

## 2023-02-09 ENCOUNTER — APPOINTMENT (OUTPATIENT)
Dept: INTERVENTIONAL RADIOLOGY/VASCULAR | Age: 73
End: 2023-02-09
Payer: MEDICARE

## 2023-02-09 ENCOUNTER — APPOINTMENT (OUTPATIENT)
Dept: GENERAL RADIOLOGY | Age: 73
End: 2023-02-09
Payer: MEDICARE

## 2023-02-09 LAB
ALBUMIN SERPL-MCNC: 3.4 G/DL (ref 3.5–5.2)
ALP BLD-CCNC: 55 U/L (ref 35–104)
ALT SERPL-CCNC: <5 U/L (ref 0–32)
ANION GAP SERPL CALCULATED.3IONS-SCNC: 7 MMOL/L (ref 7–16)
AST SERPL-CCNC: 8 U/L (ref 0–31)
BILIRUB SERPL-MCNC: 0.6 MG/DL (ref 0–1.2)
BUN BLDV-MCNC: 41 MG/DL (ref 6–23)
CALCIUM SERPL-MCNC: 8.2 MG/DL (ref 8.6–10.2)
CHLORIDE BLD-SCNC: 98 MMOL/L (ref 98–107)
CO2: 44 MMOL/L (ref 22–29)
CREAT SERPL-MCNC: 2.2 MG/DL (ref 0.5–1)
GFR SERPL CREATININE-BSD FRML MDRD: 23 ML/MIN/1.73
GLUCOSE BLD-MCNC: 112 MG/DL (ref 74–99)
INR BLD: 1.6
MAGNESIUM: 1.6 MG/DL (ref 1.6–2.6)
METER GLUCOSE: 112 MG/DL (ref 74–99)
METER GLUCOSE: 117 MG/DL (ref 74–99)
METER GLUCOSE: 118 MG/DL (ref 74–99)
METER GLUCOSE: 62 MG/DL (ref 74–99)
METER GLUCOSE: 88 MG/DL (ref 74–99)
POTASSIUM REFLEX MAGNESIUM: 3.2 MMOL/L (ref 3.5–5)
POTASSIUM SERPL-SCNC: 3.2 MMOL/L (ref 3.5–5)
PROTHROMBIN TIME: 19 SEC (ref 9.3–12.4)
SODIUM BLD-SCNC: 149 MMOL/L (ref 132–146)
TOTAL PROTEIN: 5.4 G/DL (ref 6.4–8.3)

## 2023-02-09 PROCEDURE — 2580000003 HC RX 258: Performed by: INTERNAL MEDICINE

## 2023-02-09 PROCEDURE — 5A1D70Z PERFORMANCE OF URINARY FILTRATION, INTERMITTENT, LESS THAN 6 HOURS PER DAY: ICD-10-PCS | Performed by: RADIOLOGY

## 2023-02-09 PROCEDURE — 6370000000 HC RX 637 (ALT 250 FOR IP): Performed by: NURSE PRACTITIONER

## 2023-02-09 PROCEDURE — 2060000000 HC ICU INTERMEDIATE R&B

## 2023-02-09 PROCEDURE — 6370000000 HC RX 637 (ALT 250 FOR IP): Performed by: INTERNAL MEDICINE

## 2023-02-09 PROCEDURE — 99232 SBSQ HOSP IP/OBS MODERATE 35: CPT | Performed by: INTERNAL MEDICINE

## 2023-02-09 PROCEDURE — 80048 BASIC METABOLIC PNL TOTAL CA: CPT

## 2023-02-09 PROCEDURE — 02HV33Z INSERTION OF INFUSION DEVICE INTO SUPERIOR VENA CAVA, PERCUTANEOUS APPROACH: ICD-10-PCS | Performed by: RADIOLOGY

## 2023-02-09 PROCEDURE — 76937 US GUIDE VASCULAR ACCESS: CPT

## 2023-02-09 PROCEDURE — C1894 INTRO/SHEATH, NON-LASER: HCPCS

## 2023-02-09 PROCEDURE — 2500000003 HC RX 250 WO HCPCS: Performed by: NURSE PRACTITIONER

## 2023-02-09 PROCEDURE — 94660 CPAP INITIATION&MGMT: CPT

## 2023-02-09 PROCEDURE — 85610 PROTHROMBIN TIME: CPT

## 2023-02-09 PROCEDURE — 80053 COMPREHEN METABOLIC PANEL: CPT

## 2023-02-09 PROCEDURE — 82962 GLUCOSE BLOOD TEST: CPT

## 2023-02-09 PROCEDURE — 6360000002 HC RX W HCPCS: Performed by: INTERNAL MEDICINE

## 2023-02-09 PROCEDURE — 36558 INSERT TUNNELED CV CATH: CPT

## 2023-02-09 PROCEDURE — 94640 AIRWAY INHALATION TREATMENT: CPT

## 2023-02-09 PROCEDURE — 99233 SBSQ HOSP IP/OBS HIGH 50: CPT | Performed by: INTERNAL MEDICINE

## 2023-02-09 PROCEDURE — 83735 ASSAY OF MAGNESIUM: CPT

## 2023-02-09 PROCEDURE — 90935 HEMODIALYSIS ONE EVALUATION: CPT

## 2023-02-09 PROCEDURE — 36415 COLL VENOUS BLD VENIPUNCTURE: CPT

## 2023-02-09 PROCEDURE — 6360000002 HC RX W HCPCS: Performed by: RADIOLOGY

## 2023-02-09 PROCEDURE — 77001 FLUOROGUIDE FOR VEIN DEVICE: CPT

## 2023-02-09 PROCEDURE — 71045 X-RAY EXAM CHEST 1 VIEW: CPT

## 2023-02-09 PROCEDURE — P9047 ALBUMIN (HUMAN), 25%, 50ML: HCPCS | Performed by: INTERNAL MEDICINE

## 2023-02-09 PROCEDURE — 6360000002 HC RX W HCPCS: Performed by: NURSE PRACTITIONER

## 2023-02-09 RX ORDER — ALBUMIN (HUMAN) 12.5 G/50ML
SOLUTION INTRAVENOUS
Status: COMPLETED
Start: 2023-02-09 | End: 2023-02-09

## 2023-02-09 RX ORDER — FENTANYL CITRATE 0.05 MG/ML
50 INJECTION, SOLUTION INTRAMUSCULAR; INTRAVENOUS ONCE
Status: COMPLETED | OUTPATIENT
Start: 2023-02-09 | End: 2023-02-09

## 2023-02-09 RX ORDER — ALBUMIN (HUMAN) 12.5 G/50ML
50 SOLUTION INTRAVENOUS ONCE
Status: COMPLETED | OUTPATIENT
Start: 2023-02-09 | End: 2023-02-09

## 2023-02-09 RX ADMIN — FENTANYL CITRATE 50 MCG: 50 INJECTION INTRAMUSCULAR; INTRAVENOUS at 13:21

## 2023-02-09 RX ADMIN — ARFORMOTEROL TARTRATE 15 MCG: 15 SOLUTION RESPIRATORY (INHALATION) at 06:36

## 2023-02-09 RX ADMIN — ANTI-FUNGAL POWDER MICONAZOLE NITRATE TALC FREE: 1.42 POWDER TOPICAL at 09:00

## 2023-02-09 RX ADMIN — MIRTAZAPINE 7.5 MG: 15 TABLET, FILM COATED ORAL at 23:15

## 2023-02-09 RX ADMIN — IPRATROPIUM BROMIDE AND ALBUTEROL SULFATE 3 ML: .5; 2.5 SOLUTION RESPIRATORY (INHALATION) at 06:36

## 2023-02-09 RX ADMIN — IPRATROPIUM BROMIDE AND ALBUTEROL SULFATE 3 ML: .5; 2.5 SOLUTION RESPIRATORY (INHALATION) at 09:35

## 2023-02-09 RX ADMIN — ROPINIROLE HYDROCHLORIDE 1 MG: 1 TABLET, FILM COATED ORAL at 23:15

## 2023-02-09 RX ADMIN — GLUCAGON HYDROCHLORIDE 1 MG: 1 INJECTION, POWDER, FOR SOLUTION INTRAMUSCULAR; INTRAVENOUS; SUBCUTANEOUS at 23:24

## 2023-02-09 RX ADMIN — SUCRALFATE 1 G: 1 TABLET ORAL at 23:15

## 2023-02-09 RX ADMIN — Medication 10 ML: at 09:00

## 2023-02-09 RX ADMIN — IPRATROPIUM BROMIDE AND ALBUTEROL SULFATE 3 ML: .5; 2.5 SOLUTION RESPIRATORY (INHALATION) at 14:25

## 2023-02-09 RX ADMIN — CARBIDOPA AND LEVODOPA 1 TABLET: 50; 200 TABLET, EXTENDED RELEASE ORAL at 14:50

## 2023-02-09 RX ADMIN — Medication 16 G: at 23:13

## 2023-02-09 RX ADMIN — CARBIDOPA AND LEVODOPA 1 TABLET: 50; 200 TABLET, EXTENDED RELEASE ORAL at 23:15

## 2023-02-09 RX ADMIN — ALBUMIN (HUMAN) 50 G: 0.25 INJECTION, SOLUTION INTRAVENOUS at 14:57

## 2023-02-09 RX ADMIN — ATORVASTATIN CALCIUM 20 MG: 20 TABLET, FILM COATED ORAL at 23:15

## 2023-02-09 RX ADMIN — ANTI-FUNGAL POWDER MICONAZOLE NITRATE TALC FREE: 1.42 POWDER TOPICAL at 23:15

## 2023-02-09 RX ADMIN — MONTELUKAST SODIUM 10 MG: 10 TABLET, FILM COATED ORAL at 23:15

## 2023-02-09 RX ADMIN — BUDESONIDE 500 MCG: 0.5 SUSPENSION RESPIRATORY (INHALATION) at 06:36

## 2023-02-09 RX ADMIN — ACETAZOLAMIDE SODIUM 500 MG: 500 INJECTION, POWDER, LYOPHILIZED, FOR SOLUTION INTRAVENOUS at 01:51

## 2023-02-09 ASSESSMENT — PAIN SCALES - GENERAL
PAINLEVEL_OUTOF10: 0

## 2023-02-09 NOTE — PROGRESS NOTES
[facility] MD Radha Shine MD Elray Harold, MD Renaldo Doe, BRADLEY Devlin, NUNU Ortiz, CNP  Progress Note    2/8/2023    SUBJECTIVE:   1/20: Feeling little better today. Denies new complaint. Still tachypnea, though denies dyspnea no cp/palp Appetite ok ROS otherwise unremarkable    1//21 seen in her room on o2/nc bp stable weak poor po intake   2+ edema in LE     1/22 charts reviewed . On bipap    1/23 : on o2/nc . Still look hypervolemic     1/24 seen in her room comfortable o2/nc . Still with LE edema which seems to be multifactorial and will likely need to accept having some edema on board     1/25 sitting on edge of the bed working with pt . Still with considerable edema in LEs   BP stable     1/26 seen in her room reports of SOB with minimal activity , LE edema still signficant . 1/27 good UO On 5 L/o2/nc  Still with sob with activity Tachycardiac with low functional capacity     1/28: Asleep, resting and breathing comfortably on nasal cannula. Remains edematous. Ongoing fatigue and generalized weakness. 1/29: Hypotensive last evening, Bumex drip stopped, IV normal saline bolus administered to effect. Breathing little more easily though still on AVAPS. To be downgraded to CPAP shortly. Thirsty. Still markedly edematous. 1/30: Somnolent, though arousable. On CPAP. Ongoing fatigue and malaise with generalized weakness    1/31 seen in her room , skin wrinkling in LE on HFNC . BP on lower side  For thoracentesis today   Dw RN .     2/1: Seen while laying in bed, no acute distress. Tells me that she is thirsty. She is on heated high flow nasal canula. Feels that her breathing has improved slightly. Plan is for thoracentesis later this afternoon, could not be done yesterday due to elevated INR. Still with skin wrinkling to bilateral lower extremities.      2/2 1 L removed with thoracentesis   O2 requirement better on 8 L o2/nc Very weak and deconditioned   Labile BP numbers    2/2 o2/nc LE UO ok remain weak decondtioned . Edema in LE getting somewhat worse    2/4 remain with significant depdent edema deconditioned in bed     2/5: Discussed with respiratory therapist: Just put on her CPAP after having tolerated nasal cannula throughout most of the day. Remains bedbound, severely weak and debilitated. Remains massively swollen, little change in the last 5 days. 2/6: Status quo. Somnolent but arousable. Desats and dyspneic without AVAPS. Remains massively edematous    2/7:  resp status is status quo. More awake, alert, interactive today. Resumed bumex gtt yesterday. 2/8 for dialysis line in am   Alert oriented   Remain markedly hypervolemic     PROBLEM LIST:    Principal Problem:    Acute on chronic respiratory failure with hypoxia and hypercapnia (HCC)  Active Problems:    Palliative care encounter  Resolved Problems:    * No resolved hospital problems. *         DIET:    ADULT DIET; Regular; 3 carb choices (45 gm/meal); Low Fat/Low Chol/High Fiber/2 gm Na;  Moderately Thick (Honey); 1200 ml     MEDS (scheduled):    digoxin  125 mcg IntraVENous Daily    metoprolol succinate  50 mg Oral BID    acetaZOLAMIDE (DIAMOX) IVPB  500 mg IntraVENous Q12H    [Held by provider] dilTIAZem  60 mg Oral 3 times per day    sucralfate  1 g Oral 4x Daily    rOPINIRole  1 mg Oral TID    atorvastatin  20 mg Oral Nightly    ipratropium-albuterol  1 vial Inhalation 4x Daily    [Held by provider] insulin glargine  13 Units SubCUTAneous BID    [Held by provider] LORazepam  0.5 mg Oral Nightly    insulin lispro  0-4 Units SubCUTAneous TID WC    insulin lispro  0-4 Units SubCUTAneous Nightly    budesonide  0.5 mg Nebulization BID    sodium chloride flush  10 mL IntraVENous 2 times per day    miconazole   Topical BID    arformoterol tartrate  15 mcg Nebulization BID    pantoprazole  40 mg Oral QAM AC    [Held by provider] apixaban  5 mg Oral BID mirtazapine  7.5 mg Oral Nightly    carbidopa-levodopa  1 tablet Oral TID    sertraline  50 mg Oral Daily    montelukast  10 mg Oral Nightly       MEDS (infusions):   bumetanide 0.1 mg/mL infusion 0.5 mg/hr (02/08/23 1819)    sodium chloride 25 mL/hr at 02/04/23 0222    dextrose         MEDS (prn):  sodium chloride, loperamide, sodium chloride flush, sodium chloride, promethazine **OR** ondansetron, polyethylene glycol, acetaminophen **OR** acetaminophen, glucose, dextrose bolus **OR** dextrose bolus, glucagon (rDNA), dextrose    PHYSICAL EXAM:     Patient Vitals for the past 24 hrs:   BP Temp Temp src Pulse Resp SpO2   02/08/23 1830 -- -- -- -- -- 92 %   02/08/23 1530 122/68 97.5 °F (36.4 °C) Oral 87 23 90 %   02/08/23 1500 -- -- -- -- -- 92 %   02/08/23 1402 -- -- -- -- -- 90 %   02/08/23 1331 -- -- -- -- 22 93 %   02/08/23 1230 -- -- -- -- -- 94 %   02/08/23 1200 -- -- -- -- -- 93 %   02/08/23 1134 (!) 97/54 -- -- 91 -- --   02/08/23 0830 (!) 106/57 97 °F (36.1 °C) Axillary 83 21 94 %   02/08/23 0643 -- -- -- -- 20 --   02/08/23 0408 (!) 109/59 98 °F (36.7 °C) Axillary 81 16 90 %   02/08/23 0234 (!) 99/57 -- -- -- -- --   02/08/23 0119 -- -- -- -- 24 --   02/08/23 0000 (!) 102/57 98.1 °F (36.7 °C) Axillary 80 20 92 %   02/07/23 2231 -- -- -- -- 26 --   02/07/23 2139 -- -- -- 90 -- --   02/07/23 2043 -- -- -- -- -- 90 %     @      Intake/Output Summary (Last 24 hours) at 2/8/2023 2015  Last data filed at 2/8/2023 1410  Gross per 24 hour   Intake --   Output 1500 ml   Net -1500 ml           Wt Readings from Last 3 Encounters:   02/07/23 238 lb (108 kg)   01/16/23 259 lb (117.5 kg)   01/16/23 259 lb (117.5 kg)     Age-appropriate morbidly obese white woman, though easily arousable, no apparent distress  NC/AT EOMI sclera conjunctive are clear and anicteric mucous membranes are moist  Neck soft supple trachea midline  Lungs clear to ausculation bilaterally, diminished in the bases. Poor inspiratory effort.    Regular rhythm normal S1 and S2  Abdomen soft nontender nondistended normal active bowel sounds. Abdominal pannus has substantial edema  Distal extremities, thighs, sacrum have substantial chronic type nonpitting edema, +1 BLE edema, though with skin wrinkling   Pulses 1+ x4  Moves all 4 extremities  Cranial nerves II through XII grossly intact  Awake, alert, interactive and appropriate  Skin tone consistent with chronic venous stasis distally      DATA:    Recent Labs     02/06/23  0648 02/08/23  0449   WBC 6.1 4.5   HGB 7.8* 8.3*   HCT 27.5* 30.1*   MCV 88.1 89.3    159           Recent Labs     02/06/23  0647 02/07/23  1333 02/08/23  0449    146 146   K 3.2* 3.8 3.1*   CL 94* 97* 96*   CO2 42* 39* 40*   MG 1.5* 1.6 1.6   PHOS 3.6 4.3 4.1   BUN 44* 43* 41*   CREATININE 2.3* 2.2* 2.2*   ALT <5  --   --    AST 7  --   --    BILITOT 0.4  --   --    ALKPHOS 60  --   --          Lab Results   Component Value Date    LABPROT 0.5 (H) 01/18/2023    LABPROT 0.5 01/18/2023     On CT no hydro/signs of obstruction     Urine studies  U Na 21, U Cl 23 U prot:cr ratio 0.5    ASSESSMENT / RECOMMENDATIONS:       Assessment  1. Acute kidney injury urine lytes support decrease effective volume     2. CKD stage III, Baseline creatinine 1.4 to 1.7 mg/dL, secondary to diabetic nephropathy and renal microvascular atherosclerotic disease. 0.5 g proteinuria     3. Anemia due to CKD, phlebotomy associated prolonged hospitalization     4. Acute hypercapnic and hypoxic respiratory failure due to COPD exacerbation and pneumonia. Does not appear hypervolemic. 5.  Morbid obesity, BMI 50.6 kg per metered square     6. Edema/anasarca, chronic, multifactorial, due to venous insufficiency in the setting of morbid obesity, relative immobility, likely diastolic dysfunction though it was \"indeterminate\" on echo, the does have pulmonary hypertension, mild right-sided heart failure    7. Hypotension    8.   Elevated bicarbonate represents compensation for chronic respiratory acidosis, as well as likely contraction alkalosis due to diuresis on the Bumex drip       Creatinine stable   1 L removed with thoracentesis     Remains massively hypervolemic with little improvement over the past week with Bumex drip, metolazone/Diuril, Diamox. I discussed with her using dialysis machine to ultrafiltrate --would involve placement of tunneled dialysis line, followed by daily ultrafiltration treatments of at least 4 hours, longer if we have the manpower, in effort to unload what is conservatively 25 to 30 L of extravascular water. She did not offer a definitive answer, and asked that we defer her making a decision at least until tomorrow. Recommendations    -cont Bumex drip along with diamox  -consult IR vince TDC -- on eliquis, will need to be held, line placement prob Tomorrow , if not will proceed with temporary HD line  -daily (except Sunday) UFWOD.   May need hd at some point but for now will plan just isolated ultrafiltration 4 L per tx until at dry weight    Electronically signed by Nevin Torres MD on 2/8/2023

## 2023-02-09 NOTE — PROGRESS NOTES
Patient came down to special procedures for tunneled dialysis catheter placement. Procedure was explained, questions were answered. Patient was educated about the amount of radiation used with today's procedure. 1324 Procedure start /57 80 20  92% on BIPAP supine      1336 Procedure end /59 78 21 90% on BIPAP. supine     Tunneled dialysis catheter to right IJ/chest.  Catheter sutured in place. Folded 4 x 4 and tegaderm applied to both right IJ and right chest.  CDI    Patient tolerated procedure    Total amount of medication given during procedure: 50mcg of IV Fentanyl     nurse to nurse called, spoke with Lito Newman RN, nurse notified of above information. Patient transported back to the fifth floor.

## 2023-02-09 NOTE — FLOWSHEET NOTE
Family updated on when Patient is to be going to IR for Tunneled cath placement. Consent is on the chart patient is able to signed and wanted daughter that is in the room to witness her signature.

## 2023-02-09 NOTE — PROGRESS NOTES
Department of Internal Medicine  General Internal Medicine  Attending Progress Note  Chief Complaint   Patient presents with    Respiratory Distress     Normally on 3L NC, came in on CPAP, given total of 50mcg push dose epi for low BP by EMS     SUBJECTIVE:    Back from IR Dialysis cath placement on her right subclavian region. Denied fever and chills. No chest pain.     OBJECTIVE      Medications    Current Facility-Administered Medications: albumin human 25 % IV solution 50 g, 50 g, IntraVENous, Once  digoxin (LANOXIN) injection 125 mcg, 125 mcg, IntraVENous, Daily  metoprolol succinate (TOPROL XL) extended release tablet 50 mg, 50 mg, Oral, BID  sodium chloride (OCEAN, BABY AYR) 0.65 % nasal spray 1 spray, 1 spray, Each Nostril, PRN  [Held by provider] dilTIAZem (CARDIZEM) tablet 60 mg, 60 mg, Oral, 3 times per day  sucralfate (CARAFATE) tablet 1 g, 1 g, Oral, 4x Daily  rOPINIRole (REQUIP) tablet 1 mg, 1 mg, Oral, TID  atorvastatin (LIPITOR) tablet 20 mg, 20 mg, Oral, Nightly  ipratropium-albuterol (DUONEB) nebulizer solution 3 mL, 1 vial, Inhalation, 4x Daily  [Held by provider] insulin glargine (LANTUS) injection vial 13 Units, 13 Units, SubCUTAneous, BID  loperamide (IMODIUM) capsule 2 mg, 2 mg, Oral, 4x Daily PRN  [Held by provider] LORazepam (ATIVAN) tablet 0.5 mg, 0.5 mg, Oral, Nightly  insulin lispro (HUMALOG) injection vial 0-4 Units, 0-4 Units, SubCUTAneous, TID WC  insulin lispro (HUMALOG) injection vial 0-4 Units, 0-4 Units, SubCUTAneous, Nightly  budesonide (PULMICORT) nebulizer suspension 500 mcg, 0.5 mg, Nebulization, BID  sodium chloride flush 0.9 % injection 10 mL, 10 mL, IntraVENous, 2 times per day  sodium chloride flush 0.9 % injection 10 mL, 10 mL, IntraVENous, PRN  0.9 % sodium chloride infusion, , IntraVENous, PRN  promethazine (PHENERGAN) tablet 12.5 mg, 12.5 mg, Oral, Q6H PRN **OR** ondansetron (ZOFRAN) injection 4 mg, 4 mg, IntraVENous, Q6H PRN  polyethylene glycol (GLYCOLAX) packet 17 g, 17 g, Oral, Daily PRN  acetaminophen (TYLENOL) tablet 650 mg, 650 mg, Oral, Q6H PRN **OR** acetaminophen (TYLENOL) suppository 650 mg, 650 mg, Rectal, Q6H PRN  miconazole (MICOTIN) 2 % powder, , Topical, BID  arformoterol tartrate (BROVANA) nebulizer solution 15 mcg, 15 mcg, Nebulization, BID  pantoprazole (PROTONIX) tablet 40 mg, 40 mg, Oral, QAM AC  glucose chewable tablet 16 g, 4 tablet, Oral, PRN  dextrose bolus 10% 125 mL, 125 mL, IntraVENous, PRN **OR** dextrose bolus 10% 250 mL, 250 mL, IntraVENous, PRN  glucagon (rDNA) injection 1 mg, 1 mg, SubCUTAneous, PRN  dextrose 10 % infusion, , IntraVENous, Continuous PRN  [Held by provider] apixaban (ELIQUIS) tablet 5 mg, 5 mg, Oral, BID  mirtazapine (REMERON) tablet 7.5 mg, 7.5 mg, Oral, Nightly  carbidopa-levodopa (SINEMET CR)  MG per extended release tablet 1 tablet, 1 tablet, Oral, TID  sertraline (ZOLOFT) tablet 50 mg, 50 mg, Oral, Daily  montelukast (SINGULAIR) tablet 10 mg, 10 mg, Oral, Nightly  Physical    VITALS:  BP (!) 96/54   Pulse 73   Temp 97.7 °F (36.5 °C) (Oral)   Resp 22   Ht 5' (1.524 m)   Wt 238 lb (108 kg)   SpO2 95%   BMI 46.48 kg/m²   CONSTITUTIONAL:  awake, cooperative, and no apparent distress  EYES:  extra-ocular muscles intact  ENT:  normocepalic, without obvious abnormality  NECK:  supple, symmetrical, trachea midline  LUNGS:  no increased work of breathing, no retractions, and clear to auscultation  CARDIOVASCULAR:  normal apical pulses and normal S1 and S2  ABDOMEN:  normal bowel sounds, non-distended, and non-tender  MUSCULOSKELETAL:  full range of motion noted  NEUROLOGIC:  Mental Status Exam:  Level of Alertness:   awake  Orientation:   person, place, time  SKIN:  no bruising or bleeding  Data    WBC:    Lab Results   Component Value Date/Time    WBC 4.5 02/08/2023 04:49 AM     BMP:    Lab Results   Component Value Date/Time     02/09/2023 10:12 AM    K 3.2 02/09/2023 10:12 AM    K 3.2 02/09/2023 10:12 AM    CL 98 02/09/2023 10:12 AM    CO2 44 02/09/2023 10:12 AM    BUN 41 02/09/2023 10:12 AM    LABALBU 3.4 02/09/2023 10:12 AM    CREATININE 2.2 02/09/2023 10:12 AM    CALCIUM 8.2 02/09/2023 10:12 AM    GFRAA >60 10/16/2022 09:20 AM    LABGLOM 23 02/09/2023 10:12 AM    GLUCOSE 112 02/09/2023 10:12 AM       ASSESSMENT AND PLAN      Acute on chronic respiratory failure with hypoxia and hypercapnia: diuretics. Continue breathing treatment. Continue Bipap  Palliative care encounter:  CKD now on Dialysis: Dialysis Cath Placed. Possible dialysis later today  DM type 2 complicated with Nephropathy: continue to hold Insulin. Diabetic die  Hypertension: BP now on the low end. Continue to monitor. Albumin to support oncotic pressure and Bp in turn. Dementia: continue Carbidopa-levodopa  Hyperlipidemia: on statin  COPD: not in exacerbation. Continue breathing treatment  Fluid management with Dialysis.

## 2023-02-09 NOTE — CARE COORDINATION
SS NOTE: COVID NEGATIVE 1/17, PT WILL NEED A RAPID NEGATIVE COVID TEST IF SHE RETURNS TO [facility]. Pt is on 3 liters of O2 and per nursing is spending less time on the bipap. Pt has a peripheral line. She is on IV Bumex and Lanoxin. Pt's Eliquis in on hold. She has a genao catheter. Nephrology -Dr Leora Cyr recommending pt begin HD to remove fluid that she is retaining pt to receive a tunneled HD catheter and a temporary HD cath that has a separate IV line. Critical care, Cardiology, ID, PT/OT and Palliative care are all involved. SS to continue. [facility] is still following pt. SS to continue. ESTHER Sullivan.2/9/2023.11:33AM.

## 2023-02-09 NOTE — PROGRESS NOTES
INPATIENT CARDIOLOGY Follow UP    Name: Júnior Veloz    Age: 67 y.o. Date of Admission: 1/17/2023  5:16 PM    Date of Service: 2/9/2023      Referring Physician: Nobie Shone, MD      Subjective:  Patient had a tunneled dialysis catheter placed today. Past Medical History:  Past Medical History:   Diagnosis Date    A-fib ([facility] [address]     Acute on chronic congestive heart failure (HCC)     Anxiety     Asthma     CAD (coronary artery disease) 01/21/2016    Cancer ([facility] [address]  breast ca 2006    right lumpectomy    Chronic kidney disease     nephrolithiasis    COPD exacerbation ([facility] [address] 10/12/2022    Depression     Diabetes mellitus ([facility] [address]     H/O mammogram     Hx MRSA infection     toe infection january 2012    Hyperlipidemia     Hypertension     Lateral epicondylitis     Morbid obesity (HCC)     SERENA on CPAP     Oxygen dependent     Parkinson's disease ([facility] [address]     Tubal ligation status        Past Surgical History:  Past Surgical History:   Procedure Laterality Date    BREAST LUMPECTOMY      BREAST REDUCTION SURGERY      CARDIAC CATHETERIZATION  4/28/2014    Dr. Rik Odom  01/11/2022    Dr. Vitaliy Choudhary  7/29/15    CT GUIDED CHEST TUBE  7/8/2022    CT GUIDED CHEST TUBE 7/8/2022 Karel Quinteros MD [facility]    ENDOSCOPY, COLON, DIAGNOSTIC  7/19/15    GALLBLADDER SURGERY      LUMBAR LAMINECTOMY      TOE AMPUTATION      TONSILLECTOMY      UPPER GASTROINTESTINAL ENDOSCOPY      UPPER GASTROINTESTINAL ENDOSCOPY N/A 7/19/2022    EGD ESOPHAGOGASTRODUODENOSCOPY performed by Piotr Escobar MD at [facility] ENDOSCOPY       Family History:  Family History   Problem Relation Age of Onset    Cancer Mother         Lung Ca    Cancer Father         lung Ca    Diabetes Sister     Heart Disease Sister     Seizures Son     Other Brother         sepsis       Social History:  Social History     Socioeconomic History    Marital status:       Spouse name: Not on file    Number of children: Not on file    Years of education: Not on file    Highest education level: Not on file   Occupational History    Not on file   Tobacco Use    Smoking status: Never    Smokeless tobacco: Never   Vaping Use    Vaping Use: Never used   Substance and Sexual Activity    Alcohol use: No    Drug use: No    Sexual activity: Never   Other Topics Concern    Not on file   Social History Narrative    Not on file     Social Determinants of Health     Financial Resource Strain: Not on file   Food Insecurity: Not on file   Transportation Needs: Not on file   Physical Activity: Not on file   Stress: Not on file   Social Connections: Not on file   Intimate Partner Violence: Not on file   Housing Stability: Not on file       Allergies: Allergies   Allergen Reactions    Ciprofloxacin Nausea And Vomiting    Codeine Itching     Creepy crawly \" feelings    Digoxin And Related Other (See Comments)     History of Digoxin toxicity       Home Medications:  Prior to Admission medications    Medication Sig Start Date End Date Taking? Authorizing Provider   metoprolol succinate (TOPROL XL) 25 MG extended release tablet Take 25 mg by mouth in the morning and at bedtime   Yes Historical Provider, MD   BiPAP Machine MISC by Does not apply route Nightly.  12/6 30% PER NURSING HOME PAPERWORK    Historical Provider, MD   glucagon, rDNA, 1 MG injection Inject 1 mg into the muscle as needed for Low blood sugar    Historical Provider, MD   Glucose (TRUEPLUS) 15 GM/32ML GEL Take 15 g by mouth as needed    Historical Provider, MD   hydrOXYzine pamoate (VISTARIL) 25 MG capsule Take 25 mg by mouth 3 times daily as needed for Itching    Historical Provider, MD   acetaminophen (TYLENOL) 325 MG tablet Take 650 mg by mouth every 4 hours as needed for Pain    Historical Provider, MD   apixaban (ELIQUIS) 5 MG TABS tablet Take 1 tablet by mouth 2 times daily 12/24/22   Marta Houston DO   bumetanide (BUMEX) 2 MG tablet Take 1 tablet by mouth daily 12/25/22 Edy Ast, DO   midodrine (PROAMATINE) 5 MG tablet Take 1 tablet by mouth 3 times daily (with meals) 12/25/22   Edy Ast, DO   LORazepam (ATIVAN) 0.5 MG tablet Take 0.5 mg by mouth nightly.     Historical Provider, MD   insulin lispro (HUMALOG) 100 UNIT/ML injection vial Inject into the skin 3 times daily (before meals) Sliding scale  200-249=2 UNITS  250-299=4 UNITS  300-349=6 UNITS  350-399=8 UNITS  400-500=10 UNITS    Historical Provider, MD   sertraline (ZOLOFT) 50 MG tablet Take 50 mg by mouth daily    Historical Provider, MD   loperamide (IMODIUM) 2 MG capsule Take 2 mg by mouth 4 times daily as needed for Diarrhea    Historical Provider, MD   OXYGEN Inhale 3 L into the lungs continuous    Historical Provider, MD   docusate sodium (COLACE, DULCOLAX) 100 MG CAPS Take 100 mg by mouth 2 times daily as needed for Constipation 9/8/22   Mesfin Landry MD   insulin glargine (LANTUS) 100 UNIT/ML injection vial Inject 13 Units into the skin 2 times daily 9/8/22   Mesfin Landry MD   atorvastatin (LIPITOR) 20 MG tablet Take 20 mg by mouth nightly    Historical Provider, MD   benzoin compound solution Apply 1 Applicatorful topically nightly Apply topically to right toe  Patient not taking: No sig reported    Historical Provider, MD   ipratropium-albuterol (DUONEB) 0.5-2.5 (3) MG/3ML SOLN nebulizer solution Inhale 1 vial into the lungs 4 times daily    Historical Provider, MD   miconazole (MICOTIN) 2 % powder Apply 1 each topically 2 times daily Apply topically under breasts twice daily    Historical Provider, MD   pantoprazole (PROTONIX) 40 MG tablet Take 40 mg by mouth every morning    Historical Provider, MD   mirtazapine (REMERON) 15 MG tablet Take 7.5 mg by mouth nightly    Historical Provider, MD   budesonide-formoterol (SYMBICORT) 160-4.5 MCG/ACT AERO Inhale 2 puffs into the lungs 2 times daily    Historical Provider, MD   metoprolol tartrate (LOPRESSOR) 25 MG tablet Take 0.5 tablets by mouth in the morning and 0.5 tablets before bedtime. 7/27/22 9/8/22  Quinton Buitrago MD   carbidopa-levodopa (SINEMET CR)  MG per extended release tablet Take 2 tablets by mouth in the morning and 2 tablets at noon and 2 tablets in the evening. 7/23/22   Quinton Buitrago MD   rOPINIRole (REQUIP) 1 MG tablet Take 1 tablet by mouth in the morning and 1 tablet at noon and 1 tablet before bedtime. 7/23/22   Quinton Buitrago MD   amiodarone (CORDARONE) 200 MG tablet Take 1 tablet by mouth in the morning. 7/24/22 9/8/22  Quinton Buitrago MD   budesonide (PULMICORT) 0.5 MG/2ML nebulizer suspension Take 2 mLs by nebulization in the morning and 2 mLs in the evening. 7/23/22 9/8/22  Quinton Buitrago MD   insulin [facility]arg[facility]) 100 UNIT/ML injection vial Inject 5 Units into the skin nightly 7/23/22 9/8/22  Quinton Buitrago MD   QUEtiapine (SEROQUEL) 25 MG tablet Take 1 tablet by mouth in the morning and 1 tablet before bedtime. 7/23/22 9/8/22  Quinton Buitrago MD   sucralfate (CARAFATE) 1 GM tablet Take 1 tablet by mouth in the morning and 1 tablet at noon and 1 tablet in the evening and 1 tablet before bedtime.  7/20/22   Leyla Gaines DO   divalproex (DEPAKOTE) 250 MG DR tablet Take 250 mg by mouth 2 times daily  7/23/22  Historical Provider, MD   ferrous sulfate (IRON 325) 325 (65 Fe) MG tablet Take 325 mg by mouth daily (with breakfast)  Patient not taking: Reported on 7/7/2022 7/23/22  Historical Provider, MD   aspirin EC 81 MG EC tablet Take 1 tablet by mouth daily 5/17/22   Markos Rothman MD   montelukast (SINGULAIR) 10 MG tablet Take 10 mg by mouth nightly    Historical Provider, MD       Current Medications:  Current Facility-Administered Medications   Medication Dose Route Frequency Provider Last Rate Last Admin    digoxin (LANOXIN) injection 125 mcg  125 mcg IntraVENous Daily [place] Butter, DO   125 mcg at 02/08/23 1134    metoprolol succinate (TOPROL XL) extended release tablet 50 mg  50 mg Oral BID Angie Ye DO   50 mg at 02/08/23 2155    sodium chloride (OCEAN, BABY AYR) 0.65 % nasal spray 1 spray  1 spray Each Nostril PRN Yoanna Erazo MD   1 spray at 02/08/23 1040    [Held by provider] dilTIAZem (CARDIZEM) tablet 60 mg  60 mg Oral 3 times per day Hawa Molina MD   60 mg at 01/20/23 0616    sucralfate (CARAFATE) tablet 1 g  1 g Oral 4x Daily Minus MD Do   1 g at 02/08/23 2154    rOPINIRole (REQUIP) tablet 1 mg  1 mg Oral TID Minus MD Do   1 mg at 02/08/23 2154    atorvastatin (LIPITOR) tablet 20 mg  20 mg Oral Nightly Minus MD Do   20 mg at 02/08/23 2155    ipratropium-albuterol (DUONEB) nebulizer solution 3 mL  1 vial Inhalation 4x Daily Minus MD Do   3 mL at 02/09/23 1425    [Held by provider] insulin glargine (LANTUS) injection vial 13 Units  13 Units SubCUTAneous BID Minus MD Do   13 Units at 01/27/23 0913    loperamide (IMODIUM) capsule 2 mg  2 mg Oral 4x Daily PRN Minus MD Do        [Held by provider] LORazepam (ATIVAN) tablet 0.5 mg  0.5 mg Oral Nightly Minus MD Do   0.5 mg at 01/20/23 2317    insulin lispro (HUMALOG) injection vial 0-4 Units  0-4 Units SubCUTAneous TID WC Minus MD Do   1 Units at 02/04/23 1244    insulin lispro (HUMALOG) injection vial 0-4 Units  0-4 Units SubCUTAneous Nightly Minus MD Do   4 Units at 01/18/23 2158    budesonide (PULMICORT) nebulizer suspension 500 mcg  0.5 mg Nebulization BID Minus MD oD   500 mcg at 02/09/23 0636    sodium chloride flush 0.9 % injection 10 mL  10 mL IntraVENous 2 times per day Minus MD Do   10 mL at 02/09/23 0900    sodium chloride flush 0.9 % injection 10 mL  10 mL IntraVENous PRN Minus MD Do        0.9 % sodium chloride infusion   IntraVENous PRN Minus MD Do 25 mL/hr at 02/04/23 0222 New Bag at 02/04/23 0222    promethazine (PHENERGAN) tablet 12.5 mg  12.5 mg Oral Q6H PRN Minus MD Do        Or ondansetron (ZOFRAN) injection 4 mg  4 mg IntraVENous Q6H PRN Marisa Schmitt MD        polyethylene glycol (GLYCOLAX) packet 17 g  17 g Oral Daily PRN Marisa Schmitt MD        acetaminophen (TYLENOL) tablet 650 mg  650 mg Oral Q6H PRN Marisa Schmitt MD   650 mg at 02/03/23 1014    Or    acetaminophen (TYLENOL) suppository 650 mg  650 mg Rectal Q6H PRN Marisa Schmitt MD        miconazole (MICOTIN) 2 % powder   Topical BID Jaycee Wu APRN - CNP   Given at 02/09/23 0900    arformoterol tartrate (BROVANA) nebulizer solution 15 mcg  15 mcg Nebulization BID Jaycee Wu, APRN - CNP   15 mcg at 02/09/23 0636    pantoprazole (PROTONIX) tablet 40 mg  40 mg Oral QAM AC Aaron Lowry, APRN - CNP   40 mg at 02/07/23 0503    glucose chewable tablet 16 g  4 tablet Oral PRN Jaycee Wu, APRN - CNP        dextrose bolus 10% 125 mL  125 mL IntraVENous PRN Jaycee Wu, APRN - CNP        Or    dextrose bolus 10% 250 mL  250 mL IntraVENous PRN Jaycee Bryce, APRN - CNP        glucagon (rDNA) injection 1 mg  1 mg SubCUTAneous PRN Jaycee Wu, APRN - CNP        dextrose 10 % infusion   IntraVENous Continuous PRN Jaycee Wu APRN - CNP        [Held by provider] apixaban (ELIQUIS) tablet 5 mg  5 mg Oral BID Jaycee Wu, APRN - CNP   5 mg at 02/07/23 0843    mirtazapine (REMERON) tablet 7.5 mg  7.5 mg Oral Nightly Jaycee Wu, APRN - CNP   7.5 mg at 02/08/23 2155    carbidopa-levodopa (SINEMET CR)  MG per extended release tablet 1 tablet  1 tablet Oral TID Jaycee Wu, APRN - CNP   1 tablet at 02/09/23 1450    sertraline (ZOLOFT) tablet 50 mg  50 mg Oral Daily Jaycee Wu, APRN - CNP   50 mg at 02/08/23 1302    montelukast (SINGULAIR) tablet 10 mg  10 mg Oral Nightly Jaycee Wu, APRN - CNP   10 mg at 02/08/23 2155      sodium chloride 25 mL/hr at 02/04/23 0222    dextrose         Physical Exam:  BP (!) 106/42   Pulse 69   Temp 97.7 °F (36.5 °C) (Oral)   Resp 22   Ht 5' (1.524 m)   Wt 238 lb (108 kg)   SpO2 93%   BMI 46.48 kg/m²   Wt Readings from Last 3 Encounters:   02/07/23 238 lb (108 kg)   01/16/23 259 lb (117.5 kg)   01/16/23 259 lb (117.5 kg)       Appearance: Alert and oriented x3 not in acute distress. Skin: Dry skin  Head: Atraumatic  Eyes: Intact extraocular muscles   ENMT: Mucous membranes are moist  Neck: Supple  Lungs: Clear to auscultation  Cardiac: Normal S1 and S2  Abdomen: Protuberant  Extremities: Intact range of motion  Neurologic: No focal neurological deficits  Peripheral Pulses: 2+ peripheral pulses    Intake/Output:    Intake/Output Summary (Last 24 hours) at 2/9/2023 1805  Last data filed at 2/9/2023 0636  Gross per 24 hour   Intake --   Output 1475 ml   Net -1475 ml     No intake/output data recorded. Laboratory Tests:  Recent Labs     02/07/23  1333 02/08/23  0449 02/09/23  1012    146 149*   K 3.8 3.1* 3.2*  3.2*   CL 97* 96* 98   CO2 39* 40* 44*   BUN 43* 41* 41*   CREATININE 2.2* 2.2* 2.2*   GLUCOSE 144* 117* 112*   CALCIUM 8.2* 8.1* 8.2*     Lab Results   Component Value Date/Time    MG 1.6 02/09/2023 10:12 AM    MG 1.6 02/08/2023 04:49 AM    MG 1.6 02/07/2023 01:33 PM     Recent Labs     02/09/23  1012   ALKPHOS 55   ALT <5   AST 8   PROT 5.4*   BILITOT 0.6   LABALBU 3.4*     Recent Labs     02/08/23  0449   WBC 4.5   RBC 3.37*   HGB 8.3*   HCT 30.1*   MCV 89.3   MCH 24.6*   MCHC 27.6*   RDW 17.3*      MPV 11.5     Lab Results   Component Value Date    CKTOTAL 25 01/18/2023    CKMB 2.1 04/25/2014    TROPONINI <0.01 10/26/2020    TROPONINI <0.01 08/24/2019    TROPONINI <0.01 05/29/2019     No results for input(s): CKTOTAL, CKMB, CKMBINDEX, TROPHS in the last 72 hours.   Lab Results   Component Value Date    INR 1.6 02/09/2023    INR 2.1 02/03/2023    INR 1.6 02/02/2023    PROTIME 19.0 (H) 02/09/2023    PROTIME 23.7 (H) 02/03/2023    PROTIME 18.8 (H) 02/02/2023     Lab Results   Component Value Date    TSH 6.520 (H) 11/06/2022    TSH 8.350 (H) 09/27/2022    TSH 4.580 (H) 07/06/2022     Lab Results   Component Value Date    LABA1C 6.8 (H) 12/19/2022    LABA1C 6.1 (H) 08/24/2022    LABA1C 6.5 (H) 05/12/2022     No results found for: EAG  Lab Results   Component Value Date    CHOL 95 05/12/2022    CHOL 97 04/15/2022    CHOL 146 03/18/2022     Lab Results   Component Value Date    TRIG 93 05/12/2022    TRIG 171 (H) 04/15/2022    TRIG 134 03/18/2022     Lab Results   Component Value Date    HDL 38 05/12/2022    HDL 46 04/15/2022    HDL 50 03/18/2022     Lab Results   Component Value Date    LDLCALC 38 05/12/2022    LDLCALC 17 04/15/2022    LDLCALC 69 03/18/2022     Lab Results   Component Value Date    LABVLDL 19 05/12/2022    LABVLDL 34 04/15/2022    LABVLDL 27 03/18/2022    VLDL 84 09/19/2017     Lab Results   Component Value Date    CHOLHDLRATIO 3.3 03/13/2019    CHOLHDLRATIO 3.8 12/11/2018    CHOLHDLRATIO 3.0 09/04/2018     No results for input(s): PROBNP in the last 72 hours. Cardiac Tests:        TTE 7/7/22 Telly   Summary   Technically difficult study - limited visualization. Micro-bubble contrast injected to enhance left ventricular visualization. Normal left ventricular size and systolic function. Ejection fraction is visually estimated at 70-75%. Indeterminate diastolic function. No regional wall motion abnormalities seen. Mild left ventricular concentric hypertrophy noted. Moderately dilated right ventricle with reduced function. Biatrial dilation. Mild tricuspid regurgitation. RVSP is at least 60 mmHg. Prominent pericardial fat pad. No definitive evidence of pericardial effusion. Stress test:    Pharm stress 1/2022  Gated SPECT left ventricular ejection fraction was calculated to be   74% with normal wall motion and thickening. TID ratio 1.08.    1.  ECG during the infusion did not change.     2.  The myocardial perfusion imaging was abnormal.   3.  The abnormality was a large sized, moderate fixed defect involving   the inferior and inferolateral wall suggestive prior infarct without   any reversibility. There was also a small sized mild perfusion defect   involving the mid to distal anterior wall suggestive ischemia.   4.  Overall left ventricular systolic function was normal without   regional wall motion abnormalities.   5.  No transient ischemic dilatation.   6.  High risk general pharmacologic stress test.      Cardiac catheterization:   [facility] 1/11/22 Diane  CONCLUSIONS:  1.  Coronary artery disease:  A.  Left main.  No significant disease.  B.  LAD.  Heavily calcified proximal and mid vessel with 50% mid vessel stenosis. 60% proximal stenosis of a moderate size first diagonal branch.  C.  LCX.  50% ostial narrowing of the AV groove branch in the mid vessel which is a bifurcation lesion with a large marginal branch which itself did not appear to have any significant disease.  The AV groove branch of  the left circumflex continues to provide a posterior descending artery branch and the posterolateral branch (codominant vessel).  D.  RCA.  Codominant vessel with no significant angiographic disease.  2.  Normal left ventricular size with hyperdynamic left ventricular systolic function with an estimated ejection fraction of 75%.  3.  Systemic hypertension.  4.  Elevated left ventricular end-diastolic pressure.     Echo Summary 1/19/2023:   No previous echo for comparison. Technically adequate study.   Left ventricle size is normal.   Mild concentric left ventricular hypertrophy.   Ejection fraction is visually estimated at 65%.   No regional wall motion abnormalities seen.   Indeterminate diastolic function.   Right ventricle global systolic function is mildly reduced .   Physiologic and/or trace mitral regurgitation is present.   Moderate tricuspid regurgitation.   RVSP is 47 mmHg.   Pulmonary hypertension is mild to moderate .    CXR reviewed:     The ASCVD Risk score (Jennifer DK, et al., 2019) failed to calculate for the  following reasons: The patient has a prior MI or stroke diagnosis    ASSESSMENT / PLAN:    1. Acute on chronic diastolic CHF in the setting of significant acute on chronic kidney injury:  Volume management per nephrology  Continue beta-blocker and avoid nephrotoxic agents       2. CAD: Cath 1/11/2022 with moderate non-occlusive CAD treated medically. Continue statin/BB. Not on  ASA due to eliquis. 3. Permanent Afib:   Recurrent despite multiple cardioversion's. Continue beta-blocker and consider discontinuation of diltiazem to avoid hypotension     4. Bradycardia issues:   Monitor closely on telemetry. Discontinue diltiazem  Decrease metoprolol dose if persistent bradycardia    5. HTN:   Monitor closely and hold blood pressure meds if hypotensive     6. Lipids:   Continue on statin       7. DM:   Per primary service. 8. COPD/Asthma: On home oxygen. 9. Acute on CKD:   Nephrology following. Patient had hemodialysis catheter placed today    10. Anemia/Thrombocytopenia:  Monitor closely     11. SERENA     12. Parkinson's     13. Breast CA     14. Tobacco use                   Thank you for allowing me to participate in your patient's care. Please feel free to contact me if you have any questions or concerns.     Payton Sherman MD  [facility]) Cardiology

## 2023-02-09 NOTE — PROGRESS NOTES
[facility] MD Meaghan James, MD Vladimir Gamble, MD Orin Conroy, CNP   Joyce Devlin, ANP  Skip Archuleta, BRADLEY  Progress Note    2/9/2023    SUBJECTIVE:   1/20: Feeling little better today. Denies new complaint. Still tachypnea, though denies dyspnea no cp/palp Appetite ok ROS otherwise unremarkable    1//21 seen in her room on o2/nc bp stable weak poor po intake   2+ edema in LE     1/22 charts reviewed . On bipap    1/23 : on o2/nc . Still look hypervolemic     1/24 seen in her room comfortable o2/nc . Still with LE edema which seems to be multifactorial and will likely need to accept having some edema on board     1/25 sitting on edge of the bed working with pt . Still with considerable edema in LEs   BP stable     1/26 seen in her room reports of SOB with minimal activity , LE edema still signficant . 1/27 good UO On 5 L/o2/nc  Still with sob with activity Tachycardiac with low functional capacity     1/28: Asleep, resting and breathing comfortably on nasal cannula. Remains edematous. Ongoing fatigue and generalized weakness. 1/29: Hypotensive last evening, Bumex drip stopped, IV normal saline bolus administered to effect. Breathing little more easily though still on AVAPS. To be downgraded to CPAP shortly. Thirsty. Still markedly edematous. 1/30: Somnolent, though arousable. On CPAP. Ongoing fatigue and malaise with generalized weakness    1/31 seen in her room , skin wrinkling in LE on HFNC . BP on lower side  For thoracentesis today   Papi RN .     2/1: Seen while laying in bed, no acute distress. Tells me that she is thirsty. She is on heated high flow nasal canula. Feels that her breathing has improved slightly. Plan is for thoracentesis later this afternoon, could not be done yesterday due to elevated INR. Still with skin wrinkling to bilateral lower extremities.      2/2 1 L removed with thoracentesis   O2 requirement better on 8 L o2/nc Very weak and deconditioned   Labile BP numbers    2/2 o2/nc LE UO ok remain weak decondtioned . Edema in LE getting somewhat worse    2/4 remain with significant depdent edema deconditioned in bed     2/5: Discussed with respiratory therapist: Just put on her CPAP after having tolerated nasal cannula throughout most of the day. Remains bedbound, severely weak and debilitated. Remains massively swollen, little change in the last 5 days. 2/6: Status quo. Somnolent but arousable. Desats and dyspneic without AVAPS. Remains massively edematous    2/7:  resp status is status quo. More awake, alert, interactive today. Resumed bumex gtt yesterday. 2/8 for dialysis line in am   Alert oriented   Remain markedly hypervolemic     2/9: Sitting up in bed, on Bipap. Family is present at bedside. Markedly edematous. She is asking me if [facility] placement will hurt. She reports dyspnea, but feels better on Bipap. She denies chest pain or palpitations. Plan is for [facility] catheter placement with IR today. PROBLEM LIST:    Principal Problem:    Acute on chronic respiratory failure with hypoxia and hypercapnia (HCC)  Active Problems:    Palliative care encounter  Resolved Problems:    * No resolved hospital problems. *         DIET:    ADULT DIET; Regular; 3 carb choices (45 gm/meal); Low Fat/Low Chol/High Fiber/2 gm Na;  Moderately Thick (Honey); 1200 ml     MEDS (scheduled):    albumin human  50 g IntraVENous Once    digoxin  125 mcg IntraVENous Daily    metoprolol succinate  50 mg Oral BID    [Held by provider] dilTIAZem  60 mg Oral 3 times per day    sucralfate  1 g Oral 4x Daily    rOPINIRole  1 mg Oral TID    atorvastatin  20 mg Oral Nightly    ipratropium-albuterol  1 vial Inhalation 4x Daily    [Held by provider] insulin glargine  13 Units SubCUTAneous BID    [Held by provider] LORazepam  0.5 mg Oral Nightly    insulin lispro  0-4 Units SubCUTAneous TID WC    insulin lispro  0-4 Units SubCUTAneous Nightly budesonide  0.5 mg Nebulization BID    sodium chloride flush  10 mL IntraVENous 2 times per day    miconazole   Topical BID    arformoterol tartrate  15 mcg Nebulization BID    pantoprazole  40 mg Oral QAM AC    [Held by provider] apixaban  5 mg Oral BID    mirtazapine  7.5 mg Oral Nightly    carbidopa-levodopa  1 tablet Oral TID    sertraline  50 mg Oral Daily    montelukast  10 mg Oral Nightly       MEDS (infusions):   sodium chloride 25 mL/hr at 02/04/23 0222    dextrose         MEDS (prn):  sodium chloride, loperamide, sodium chloride flush, sodium chloride, promethazine **OR** ondansetron, polyethylene glycol, acetaminophen **OR** acetaminophen, glucose, dextrose bolus **OR** dextrose bolus, glucagon (rDNA), dextrose    PHYSICAL EXAM:     Patient Vitals for the past 24 hrs:   BP Temp Temp src Pulse Resp SpO2   02/09/23 1204 (!) 103/59 -- -- 75 -- --   02/09/23 0935 -- -- -- -- 22 94 %   02/09/23 0803 101/64 97.7 °F (36.5 °C) Oral 75 19 100 %   02/09/23 0636 -- -- -- -- 26 --   02/08/23 2151 114/65 97.7 °F (36.5 °C) Axillary 81 -- 92 %   02/08/23 2149 -- -- -- -- 22 --   02/08/23 1830 -- -- -- -- -- 92 %   02/08/23 1530 122/68 97.5 °F (36.4 °C) Oral 87 23 90 %   02/08/23 1500 -- -- -- -- -- 92 %     @      Intake/Output Summary (Last 24 hours) at 2/9/2023 1451  Last data filed at 2/9/2023 0636  Gross per 24 hour   Intake --   Output 1475 ml   Net -1475 ml           Wt Readings from Last 3 Encounters:   02/07/23 238 lb (108 kg)   01/16/23 259 lb (117.5 kg)   01/16/23 259 lb (117.5 kg)     Age-appropriate morbidly obese white woman, though easily arousable, no apparent distress  NC/AT EOMI sclera conjunctive are clear and anicteric mucous membranes are moist  Neck soft supple trachea midline  Lungs clear to ausculation bilaterally, diminished in the bases. Poor inspiratory effort. Regular rhythm normal S1 and S2  Abdomen soft nontender nondistended normal active bowel sounds.   Abdominal pannus has substantial edema  Distal extremities, thighs, sacrum have substantial chronic type nonpitting edema, +1 BLE edema. Periorbital and facial edema noted. Pulses 1+ x4  Moves all 4 extremities  Cranial nerves II through XII grossly intact  Awake, alert, interactive and appropriate  Skin tone consistent with chronic venous stasis distally      DATA:    Recent Labs     02/08/23  0449   WBC 4.5   HGB 8.3*   HCT 30.1*   MCV 89.3              Recent Labs     02/07/23  1333 02/08/23  0449 02/09/23  1012    146 149*   K 3.8 3.1* 3.2*  3.2*   CL 97* 96* 98   CO2 39* 40* 44*   MG 1.6 1.6 1.6   PHOS 4.3 4.1  --    BUN 43* 41* 41*   CREATININE 2.2* 2.2* 2.2*   ALT  --   --  <5   AST  --   --  8   BILITOT  --   --  0.6   ALKPHOS  --   --  55         Lab Results   Component Value Date    LABPROT 0.5 (H) 01/18/2023    LABPROT 0.5 01/18/2023     On CT no hydro/signs of obstruction     Urine studies  U Na 21, U Cl 23 U prot:cr ratio 0.5    ASSESSMENT / RECOMMENDATIONS:       Assessment  1. Acute kidney injury urine lytes support decrease effective volume     2. CKD stage III, Baseline creatinine 1.4 to 1.7 mg/dL, secondary to diabetic nephropathy and renal microvascular atherosclerotic disease. 0.5 g proteinuria     3. Anemia due to CKD, phlebotomy associated prolonged hospitalization     4. Acute hypercapnic and hypoxic respiratory failure due to COPD exacerbation and pneumonia. Does not appear hypervolemic. 5.  Morbid obesity, BMI 50.6 kg per metered square     6. Edema/anasarca, chronic, multifactorial, due to venous insufficiency in the setting of morbid obesity, relative immobility, likely diastolic dysfunction though it was \"indeterminate\" on echo, the does have pulmonary hypertension, mild right-sided heart failure    7. Hypotension    8.   Elevated bicarbonate represents compensation for chronic respiratory acidosis, as well as likely contraction alkalosis due to diuresis on the Bumex drip       Creatinine stable 1 L removed with thoracentesis     Remains massively hypervolemic with little improvement over the past week with Bumex drip, metolazone/Diuril, Diamox. I discussed with her using dialysis machine to ultrafiltrate --would involve placement of tunneled dialysis line, followed by daily ultrafiltration treatments of at least 4 hours, longer if we have the manpower, in effort to unload what is conservatively 25 to 30 L of extravascular water. Chest xray-large right pleural effusion     Recommendations    -stop Bumex drip   -IR today for tunneled dialysis catheter  -hemodialysis today (4K bath)    -daily (except Sunday) UFWOD.   May need hd at some point but for now will plan just isolated ultrafiltration 4 L per tx until at dry weight    Electronically signed by RASHARD Nunez - BRADLEY on 2/9/2023

## 2023-02-10 ENCOUNTER — HOSPITAL ENCOUNTER (OUTPATIENT)
Dept: OTHER | Age: 73
Discharge: HOME | End: 2023-02-10

## 2023-02-10 LAB
ANION GAP SERPL CALCULATED.3IONS-SCNC: 8 MMOL/L (ref 7–16)
BUN BLDV-MCNC: 23 MG/DL (ref 6–23)
CALCIUM SERPL-MCNC: 7.8 MG/DL (ref 8.6–10.2)
CHLORIDE BLD-SCNC: 100 MMOL/L (ref 98–107)
CO2: 37 MMOL/L (ref 22–29)
CREAT SERPL-MCNC: 1.5 MG/DL (ref 0.5–1)
GFR SERPL CREATININE-BSD FRML MDRD: 37 ML/MIN/1.73
GLUCOSE BLD-MCNC: 101 MG/DL (ref 74–99)
METER GLUCOSE: 102 MG/DL (ref 74–99)
METER GLUCOSE: 120 MG/DL (ref 74–99)
METER GLUCOSE: 145 MG/DL (ref 74–99)
METER GLUCOSE: 148 MG/DL (ref 74–99)
METER GLUCOSE: 192 MG/DL (ref 74–99)
POTASSIUM REFLEX MAGNESIUM: 3.8 MMOL/L (ref 3.5–5)
SODIUM BLD-SCNC: 145 MMOL/L (ref 132–146)

## 2023-02-10 PROCEDURE — 6370000000 HC RX 637 (ALT 250 FOR IP): Performed by: INTERNAL MEDICINE

## 2023-02-10 PROCEDURE — 6360000002 HC RX W HCPCS: Performed by: NURSE PRACTITIONER

## 2023-02-10 PROCEDURE — 82962 GLUCOSE BLOOD TEST: CPT

## 2023-02-10 PROCEDURE — 6360000002 HC RX W HCPCS: Performed by: FAMILY MEDICINE

## 2023-02-10 PROCEDURE — 36415 COLL VENOUS BLD VENIPUNCTURE: CPT

## 2023-02-10 PROCEDURE — 80048 BASIC METABOLIC PNL TOTAL CA: CPT

## 2023-02-10 PROCEDURE — 2060000000 HC ICU INTERMEDIATE R&B

## 2023-02-10 PROCEDURE — 99233 SBSQ HOSP IP/OBS HIGH 50: CPT | Performed by: INTERNAL MEDICINE

## 2023-02-10 PROCEDURE — 6370000000 HC RX 637 (ALT 250 FOR IP): Performed by: NURSE PRACTITIONER

## 2023-02-10 PROCEDURE — 94640 AIRWAY INHALATION TREATMENT: CPT

## 2023-02-10 PROCEDURE — 6360000002 HC RX W HCPCS: Performed by: INTERNAL MEDICINE

## 2023-02-10 PROCEDURE — 2700000000 HC OXYGEN THERAPY PER DAY

## 2023-02-10 PROCEDURE — 2580000003 HC RX 258: Performed by: INTERNAL MEDICINE

## 2023-02-10 PROCEDURE — 90935 HEMODIALYSIS ONE EVALUATION: CPT

## 2023-02-10 PROCEDURE — P9047 ALBUMIN (HUMAN), 25%, 50ML: HCPCS | Performed by: INTERNAL MEDICINE

## 2023-02-10 PROCEDURE — 99232 SBSQ HOSP IP/OBS MODERATE 35: CPT | Performed by: INTERNAL MEDICINE

## 2023-02-10 PROCEDURE — 94660 CPAP INITIATION&MGMT: CPT

## 2023-02-10 RX ORDER — ALBUMIN (HUMAN) 12.5 G/50ML
50 SOLUTION INTRAVENOUS ONCE
Status: COMPLETED | OUTPATIENT
Start: 2023-02-10 | End: 2023-02-10

## 2023-02-10 RX ADMIN — IPRATROPIUM BROMIDE AND ALBUTEROL SULFATE 3 ML: .5; 2.5 SOLUTION RESPIRATORY (INHALATION) at 06:11

## 2023-02-10 RX ADMIN — Medication 10 ML: at 09:04

## 2023-02-10 RX ADMIN — MIRTAZAPINE 7.5 MG: 15 TABLET, FILM COATED ORAL at 20:45

## 2023-02-10 RX ADMIN — ROPINIROLE HYDROCHLORIDE 1 MG: 1 TABLET, FILM COATED ORAL at 20:43

## 2023-02-10 RX ADMIN — ATORVASTATIN CALCIUM 20 MG: 20 TABLET, FILM COATED ORAL at 20:43

## 2023-02-10 RX ADMIN — SUCRALFATE 1 G: 1 TABLET ORAL at 20:43

## 2023-02-10 RX ADMIN — IPRATROPIUM BROMIDE AND ALBUTEROL SULFATE 3 ML: .5; 2.5 SOLUTION RESPIRATORY (INHALATION) at 09:59

## 2023-02-10 RX ADMIN — MONTELUKAST SODIUM 10 MG: 10 TABLET, FILM COATED ORAL at 20:43

## 2023-02-10 RX ADMIN — SUCRALFATE 1 G: 1 TABLET ORAL at 13:38

## 2023-02-10 RX ADMIN — ROPINIROLE HYDROCHLORIDE 1 MG: 1 TABLET, FILM COATED ORAL at 13:38

## 2023-02-10 RX ADMIN — ALBUMIN (HUMAN) 50 G: 0.25 INJECTION, SOLUTION INTRAVENOUS at 10:45

## 2023-02-10 RX ADMIN — ANTI-FUNGAL POWDER MICONAZOLE NITRATE TALC FREE: 1.42 POWDER TOPICAL at 09:03

## 2023-02-10 RX ADMIN — BUDESONIDE 500 MCG: 0.5 SUSPENSION RESPIRATORY (INHALATION) at 17:04

## 2023-02-10 RX ADMIN — IPRATROPIUM BROMIDE AND ALBUTEROL SULFATE 3 ML: .5; 2.5 SOLUTION RESPIRATORY (INHALATION) at 17:04

## 2023-02-10 RX ADMIN — BUDESONIDE 500 MCG: 0.5 SUSPENSION RESPIRATORY (INHALATION) at 06:11

## 2023-02-10 RX ADMIN — CARBIDOPA AND LEVODOPA 1 TABLET: 50; 200 TABLET, EXTENDED RELEASE ORAL at 20:44

## 2023-02-10 RX ADMIN — CARBIDOPA AND LEVODOPA 1 TABLET: 50; 200 TABLET, EXTENDED RELEASE ORAL at 15:35

## 2023-02-10 RX ADMIN — ANTI-FUNGAL POWDER MICONAZOLE NITRATE TALC FREE: 1.42 POWDER TOPICAL at 20:44

## 2023-02-10 RX ADMIN — SERTRALINE 50 MG: 50 TABLET, FILM COATED ORAL at 09:01

## 2023-02-10 RX ADMIN — DIGOXIN 125 MCG: 0.25 INJECTION INTRAMUSCULAR; INTRAVENOUS at 10:47

## 2023-02-10 RX ADMIN — CARBIDOPA AND LEVODOPA 1 TABLET: 50; 200 TABLET, EXTENDED RELEASE ORAL at 09:01

## 2023-02-10 RX ADMIN — SUCRALFATE 1 G: 1 TABLET ORAL at 09:01

## 2023-02-10 RX ADMIN — ARFORMOTEROL TARTRATE 15 MCG: 15 SOLUTION RESPIRATORY (INHALATION) at 06:11

## 2023-02-10 RX ADMIN — PANTOPRAZOLE SODIUM 40 MG: 40 TABLET, DELAYED RELEASE ORAL at 05:41

## 2023-02-10 RX ADMIN — SUCRALFATE 1 G: 1 TABLET ORAL at 17:24

## 2023-02-10 RX ADMIN — ROPINIROLE HYDROCHLORIDE 1 MG: 1 TABLET, FILM COATED ORAL at 09:01

## 2023-02-10 RX ADMIN — Medication 10 ML: at 23:04

## 2023-02-10 RX ADMIN — ARFORMOTEROL TARTRATE 15 MCG: 15 SOLUTION RESPIRATORY (INHALATION) at 17:04

## 2023-02-10 ASSESSMENT — PAIN SCALES - GENERAL: PAINLEVEL_OUTOF10: 0

## 2023-02-10 NOTE — PROGRESS NOTES
INPATIENT CARDIOLOGY Follow UP    Name: Júnior Veloz    Age: 67 y.o. Date of Admission: 1/17/2023  5:16 PM    Date of Service: 2/10/2023      Referring Physician: Nobie Shone, MD      Subjective:  Patient had a tunneled dialysis catheter placed yesterday. Past Medical History:  Past Medical History:   Diagnosis Date    A-fib ([facility]     Acute on chronic congestive heart failure (HCC)     Anxiety     Asthma     CAD (coronary artery disease) 01/21/2016    Cancer ([facility]  breast ca 2006    right lumpectomy    Chronic kidney disease     nephrolithiasis    COPD exacerbation ([facility] 10/12/2022    Depression     Diabetes mellitus ([facility]     H/O mammogram     Hx MRSA infection     toe infection january 2012    Hyperlipidemia     Hypertension     Lateral epicondylitis     Morbid obesity (HCC)     SERENA on CPAP     Oxygen dependent     Parkinson's disease ([facility]     Tubal ligation status        Past Surgical History:  Past Surgical History:   Procedure Laterality Date    BREAST LUMPECTOMY      BREAST REDUCTION SURGERY      CARDIAC CATHETERIZATION  4/28/2014    Dr. Rik Odom  01/11/2022    Dr. Vitaliy Choudhary  7/29/15    CT GUIDED CHEST TUBE  7/8/2022    CT GUIDED CHEST TUBE 7/8/2022 Karel Quinteros MD [facility]    ENDOSCOPY, COLON, DIAGNOSTIC  7/19/15    GALLBLADDER SURGERY      LUMBAR LAMINECTOMY      TOE AMPUTATION      TONSILLECTOMY      UPPER GASTROINTESTINAL ENDOSCOPY      UPPER GASTROINTESTINAL ENDOSCOPY N/A 7/19/2022    EGD ESOPHAGOGASTRODUODENOSCOPY performed by Piotr Escobar MD at [facility] ENDOSCOPY       Family History:  Family History   Problem Relation Age of Onset    Cancer Mother         Lung Ca    Cancer Father         lung Ca    Diabetes Sister     Heart Disease Sister     Seizures Son     Other Brother         sepsis       Social History:  Social History     Socioeconomic History    Marital status:       Spouse name: Not on file    Number of children: Not on file    Years of education: Not on file    Highest education level: Not on file   Occupational History    Not on file   Tobacco Use    Smoking status: Never    Smokeless tobacco: Never   Vaping Use    Vaping Use: Never used   Substance and Sexual Activity    Alcohol use: No    Drug use: No    Sexual activity: Never   Other Topics Concern    Not on file   Social History Narrative    Not on file     Social Determinants of Health     Financial Resource Strain: Not on file   Food Insecurity: Not on file   Transportation Needs: Not on file   Physical Activity: Not on file   Stress: Not on file   Social Connections: Not on file   Intimate Partner Violence: Not on file   Housing Stability: Not on file       Allergies: Allergies   Allergen Reactions    Ciprofloxacin Nausea And Vomiting    Codeine Itching     Creepy crawly \" feelings    Digoxin And Related Other (See Comments)     History of Digoxin toxicity       Home Medications:  Prior to Admission medications    Medication Sig Start Date End Date Taking? Authorizing Provider   metoprolol succinate (TOPROL XL) 25 MG extended release tablet Take 25 mg by mouth in the morning and at bedtime   Yes Historical Provider, MD   BiPAP Machine MISC by Does not apply route Nightly.  12/6 30% PER NURSING HOME PAPERWORK    Historical Provider, MD   glucagon, rDNA, 1 MG injection Inject 1 mg into the muscle as needed for Low blood sugar    Historical Provider, MD   Glucose (TRUEPLUS) 15 GM/32ML GEL Take 15 g by mouth as needed    Historical Provider, MD   hydrOXYzine pamoate (VISTARIL) 25 MG capsule Take 25 mg by mouth 3 times daily as needed for Itching    Historical Provider, MD   acetaminophen (TYLENOL) 325 MG tablet Take 650 mg by mouth every 4 hours as needed for Pain    Historical Provider, MD   apixaban (ELIQUIS) 5 MG TABS tablet Take 1 tablet by mouth 2 times daily 12/24/22   Radha Chamberlain DO   bumetanide (BUMEX) 2 MG tablet Take 1 tablet by mouth daily 12/25/22   Jennifer Modi, DO   midodrine (PROAMATINE) 5 MG tablet Take 1 tablet by mouth 3 times daily (with meals) 12/25/22   Jennifer Butter, DO   LORazepam (ATIVAN) 0.5 MG tablet Take 0.5 mg by mouth nightly.     Historical Provider, MD   insulin lispro (HUMALOG) 100 UNIT/ML injection vial Inject into the skin 3 times daily (before meals) Sliding scale  200-249=2 UNITS  250-299=4 UNITS  300-349=6 UNITS  350-399=8 UNITS  400-500=10 UNITS    Historical Provider, MD   sertraline (ZOLOFT) 50 MG tablet Take 50 mg by mouth daily    Historical Provider, MD   loperamide (IMODIUM) 2 MG capsule Take 2 mg by mouth 4 times daily as needed for Diarrhea    Historical Provider, MD   OXYGEN Inhale 3 L into the lungs continuous    Historical Provider, MD   docusate sodium (COLACE, DULCOLAX) 100 MG CAPS Take 100 mg by mouth 2 times daily as needed for Constipation 9/8/22   Georgie Stewart MD   insulin glargine (LANTUS) 100 UNIT/ML injection vial Inject 13 Units into the skin 2 times daily 9/8/22   Georgie Stewart MD   atorvastatin (LIPITOR) 20 MG tablet Take 20 mg by mouth nightly    Historical Provider, MD   benzoin compound solution Apply 1 Applicatorful topically nightly Apply topically to right toe  Patient not taking: No sig reported    Historical Provider, MD   ipratropium-albuterol (DUONEB) 0.5-2.5 (3) MG/3ML SOLN nebulizer solution Inhale 1 vial into the lungs 4 times daily    Historical Provider, MD   miconazole (MICOTIN) 2 % powder Apply 1 each topically 2 times daily Apply topically under breasts twice daily    Historical Provider, MD   pantoprazole (PROTONIX) 40 MG tablet Take 40 mg by mouth every morning    Historical Provider, MD   mirtazapine (REMERON) 15 MG tablet Take 7.5 mg by mouth nightly    Historical Provider, MD   budesonide-formoterol (SYMBICORT) 160-4.5 MCG/ACT AERO Inhale 2 puffs into the lungs 2 times daily    Historical Provider, MD   metoprolol tartrate (LOPRESSOR) 25 MG tablet Take 0.5 tablets by mouth in the morning and 0.5 tablets before bedtime. 7/27/22 9/8/22  Marjorie Suazo MD   carbidopa-levodopa (SINEMET CR)  MG per extended release tablet Take 2 tablets by mouth in the morning and 2 tablets at noon and 2 tablets in the evening. 7/23/22   Marjorie Suazo MD   rOPINIRole (REQUIP) 1 MG tablet Take 1 tablet by mouth in the morning and 1 tablet at noon and 1 tablet before bedtime. 7/23/22   Marjorie Suazo MD   amiodarone (CORDARONE) 200 MG tablet Take 1 tablet by mouth in the morning. 7/24/22 9/8/22  Marjorie Suazo MD   budesonide (PULMICORT) 0.5 MG/2ML nebulizer suspension Take 2 mLs by nebulization in the morning and 2 mLs in the evening. 7/23/22 9/8/22  Marjorie Suazo MD   insulin glargine-[facility]) 100 UNIT/ML injection vial Inject 5 Units into the skin nightly 7/23/22 9/8/22  Marjorie Suazo MD   QUEtiapine (SEROQUEL) 25 MG tablet Take 1 tablet by mouth in the morning and 1 tablet before bedtime. 7/23/22 9/8/22  Marjorie Suazo MD   sucralfate (CARAFATE) 1 GM tablet Take 1 tablet by mouth in the morning and 1 tablet at noon and 1 tablet in the evening and 1 tablet before bedtime.  7/20/22   Leyla Gaines DO   divalproex (DEPAKOTE) 250 MG DR tablet Take 250 mg by mouth 2 times daily  7/23/22  Historical Provider, MD   ferrous sulfate (IRON 325) 325 (65 Fe) MG tablet Take 325 mg by mouth daily (with breakfast)  Patient not taking: Reported on 7/7/2022 7/23/22  Historical Provider, MD   aspirin EC 81 MG EC tablet Take 1 tablet by mouth daily 5/17/22   Angelita Dejesus MD   montelukast (SINGULAIR) 10 MG tablet Take 10 mg by mouth nightly    Historical Provider, MD       Current Medications:  Current Facility-Administered Medications   Medication Dose Route Frequency Provider Last Rate Last Admin    digoxin (LANOXIN) injection 125 mcg  125 mcg IntraVENous Daily Hipolito Butcher DO   125 mcg at 02/10/23 1047    metoprolol succinate (Kelly Aguila XL) extended release tablet 50 mg  50 mg Oral BID Lusharon Reynolds DO   50 mg at 02/08/23 2155    sodium chloride (OCEAN, BABY AYR) 0.65 % nasal spray 1 spray  1 spray Each Nostril PRN Tere Mancera MD   1 spray at 02/08/23 1040    [Held by provider] dilTIAZem (CARDIZEM) tablet 60 mg  60 mg Oral 3 times per day Juan Kim MD   60 mg at 01/20/23 0616    sucralfate (CARAFATE) tablet 1 g  1 g Oral 4x Daily Salima Dumont MD   1 g at 02/10/23 1338    rOPINIRole (REQUIP) tablet 1 mg  1 mg Oral TID Salima Dumont MD   1 mg at 02/10/23 1338    atorvastatin (LIPITOR) tablet 20 mg  20 mg Oral Nightly Salima Dumont MD   20 mg at 02/09/23 2315    ipratropium-albuterol (DUONEB) nebulizer solution 3 mL  1 vial Inhalation 4x Daily Salima Dumont MD   3 mL at 02/10/23 0959    [Held by provider] insulin glargine (LANTUS) injection vial 13 Units  13 Units SubCUTAneous BID Salima Dumont MD   13 Units at 01/27/23 0913    loperamide (IMODIUM) capsule 2 mg  2 mg Oral 4x Daily PRN Salima Dumont MD        [Held by provider] LORazepam (ATIVAN) tablet 0.5 mg  0.5 mg Oral Nightly Salima Dumont MD   0.5 mg at 01/20/23 2317    insulin lispro (HUMALOG) injection vial 0-4 Units  0-4 Units SubCUTAneous TID WC Salima Dumont MD   1 Units at 02/04/23 1244    insulin lispro (HUMALOG) injection vial 0-4 Units  0-4 Units SubCUTAneous Nightly Salima Dumont MD   4 Units at 01/18/23 2158    budesonide (PULMICORT) nebulizer suspension 500 mcg  0.5 mg Nebulization BID Salima Dumont MD   500 mcg at 02/10/23 4975    sodium chloride flush 0.9 % injection 10 mL  10 mL IntraVENous 2 times per day Salima Dumont MD   10 mL at 02/10/23 0904    sodium chloride flush 0.9 % injection 10 mL  10 mL IntraVENous PRN Salima Dumont MD        0.9 % sodium chloride infusion   IntraVENous PRN Salima Dumont MD 25 mL/hr at 02/04/23 0222 New Bag at 02/04/23 0222    promethazine (PHENERGAN) tablet 12.5 mg  12.5 mg Oral Q6H PRN Salima Dumont MD Or    ondansetron (ZOFRAN) injection 4 mg  4 mg IntraVENous Q6H PRN Raymundo Taylor MD        polyethylene glycol (GLYCOLAX) packet 17 g  17 g Oral Daily PRN Raymundo Taylor MD        acetaminophen (TYLENOL) tablet 650 mg  650 mg Oral Q6H PRN Raymundo Taylor MD   650 mg at 02/03/23 1014    Or    acetaminophen (TYLENOL) suppository 650 mg  650 mg Rectal Q6H PRN Raymundo Taylor MD        miconazole (MICOTIN) 2 % powder   Topical BID RASHARD Gonzalez CNP   Given at 02/10/23 0903    arformoterol tartrate (BROVANA) nebulizer solution 15 mcg  15 mcg Nebulization BID RASHARD Gonzalez CNP   15 mcg at 02/10/23 0611    pantoprazole (PROTONIX) tablet 40 mg  40 mg Oral QAM AC RASHARD Marshall CNP   40 mg at 02/10/23 0541    glucose chewable tablet 16 g  4 tablet Oral PRN RASHARD Gonzalez CNP   16 g at 02/09/23 2313    dextrose bolus 10% 125 mL  125 mL IntraVENous PRN RASHARD Gonzalez CNP        Or    dextrose bolus 10% 250 mL  250 mL IntraVENous PRN RASHARD Gonzalez CNP        glucagon (rDNA) injection 1 mg  1 mg SubCUTAneous PRN RASHARD Gonzalez CNP   1 mg at 02/09/23 2324    dextrose 10 % infusion   IntraVENous Continuous PRN RASHARD Gonzalez CNP        [Held by provider] apixaban (ELIQUIS) tablet 5 mg  5 mg Oral BID RASHARD Gonzalez CNP   5 mg at 02/07/23 0843    mirtazapine (REMERON) tablet 7.5 mg  7.5 mg Oral Nightly RASHARD Gonzalez CNP   7.5 mg at 02/09/23 2315    carbidopa-levodopa (SINEMET CR)  MG per extended release tablet 1 tablet  1 tablet Oral TID RASHARD Gonzalez CNP   1 tablet at 02/10/23 1535    sertraline (ZOLOFT) tablet 50 mg  50 mg Oral Daily RASHARD Gonzalez CNP   50 mg at 02/10/23 0901    montelukast (SINGULAIR) tablet 10 mg  10 mg Oral Nightly RASHARD Gonzalez CNP   10 mg at 02/09/23 2315      sodium chloride 25 mL/hr at 02/04/23 0222    dextrose         Physical Exam:  BP (!) 107/56   Pulse 82   Temp 97.3 °F (36.3 °C)   Resp 24   Ht 5' (1.524 m)   Wt 232 lb 2.3 oz (105.3 kg)   SpO2 96%   BMI 45.34 kg/m²   Wt Readings from Last 3 Encounters:   02/10/23 232 lb 2.3 oz (105.3 kg)   01/16/23 259 lb (117.5 kg)   01/16/23 259 lb (117.5 kg)       Appearance: Alert and oriented x3 not in acute distress. Skin: Dry skin  Head: Atraumatic  Eyes: Intact extraocular muscles   ENMT: Mucous membranes are moist  Neck: Supple  Lungs: Clear to auscultation  Cardiac: Normal S1 and S2  Abdomen: Protuberant  Extremities: Intact range of motion  Neurologic: No focal neurological deficits  Peripheral Pulses: 2+ peripheral pulses    Intake/Output:    Intake/Output Summary (Last 24 hours) at 2/10/2023 1613  Last data filed at 2/10/2023 0432  Gross per 24 hour   Intake 300 ml   Output 3200 ml   Net -2900 ml     No intake/output data recorded. Laboratory Tests:  Recent Labs     02/08/23  0449 02/09/23  1012 02/10/23  0733    149* 145   K 3.1* 3.2*  3.2* 3.8   CL 96* 98 100   CO2 40* 44* 37*   BUN 41* 41* 23   CREATININE 2.2* 2.2* 1.5*   GLUCOSE 117* 112* 101*   CALCIUM 8.1* 8.2* 7.8*     Lab Results   Component Value Date/Time    MG 1.6 02/09/2023 10:12 AM    MG 1.6 02/08/2023 04:49 AM    MG 1.6 02/07/2023 01:33 PM     Recent Labs     02/09/23  1012   ALKPHOS 55   ALT <5   AST 8   PROT 5.4*   BILITOT 0.6   LABALBU 3.4*     Recent Labs     02/08/23  0449   WBC 4.5   RBC 3.37*   HGB 8.3*   HCT 30.1*   MCV 89.3   MCH 24.6*   MCHC 27.6*   RDW 17.3*      MPV 11.5     Lab Results   Component Value Date    CKTOTAL 25 01/18/2023    CKMB 2.1 04/25/2014    TROPONINI <0.01 10/26/2020    TROPONINI <0.01 08/24/2019    TROPONINI <0.01 05/29/2019     No results for input(s): CKTOTAL, CKMB, CKMBINDEX, TROPHS in the last 72 hours.   Lab Results   Component Value Date    INR 1.6 02/09/2023    INR 2.1 02/03/2023    INR 1.6 02/02/2023    PROTIME 19.0 (H) 02/09/2023    PROTIME 23.7 (H) 02/03/2023    PROTIME 18.8 (H) 02/02/2023     Lab Results Component Value Date    TSH 6.520 (H) 11/06/2022    TSH 8.350 (H) 09/27/2022    TSH 4.580 (H) 07/06/2022     Lab Results   Component Value Date    LABA1C 6.8 (H) 12/19/2022    LABA1C 6.1 (H) 08/24/2022    LABA1C 6.5 (H) 05/12/2022     No results found for: EAG  Lab Results   Component Value Date    CHOL 95 05/12/2022    CHOL 97 04/15/2022    CHOL 146 03/18/2022     Lab Results   Component Value Date    TRIG 93 05/12/2022    TRIG 171 (H) 04/15/2022    TRIG 134 03/18/2022     Lab Results   Component Value Date    HDL 38 05/12/2022    HDL 46 04/15/2022    HDL 50 03/18/2022     Lab Results   Component Value Date    LDLCALC 38 05/12/2022    LDLCALC 17 04/15/2022    LDLCALC 69 03/18/2022     Lab Results   Component Value Date    LABVLDL 19 05/12/2022    LABVLDL 34 04/15/2022    LABVLDL 27 03/18/2022    VLDL 84 09/19/2017     Lab Results   Component Value Date    CHOLHDLRATIO 3.3 03/13/2019    CHOLHDLRATIO 3.8 12/11/2018    CHOLHDLRATIO 3.0 09/04/2018     No results for input(s): PROBNP in the last 72 hours. Cardiac Tests:        TTE 7/7/22 Telly   Summary   Technically difficult study - limited visualization. Micro-bubble contrast injected to enhance left ventricular visualization. Normal left ventricular size and systolic function. Ejection fraction is visually estimated at 70-75%. Indeterminate diastolic function. No regional wall motion abnormalities seen. Mild left ventricular concentric hypertrophy noted. Moderately dilated right ventricle with reduced function. Biatrial dilation. Mild tricuspid regurgitation. RVSP is at least 60 mmHg. Prominent pericardial fat pad. No definitive evidence of pericardial effusion. Stress test:    Pharm stress 1/2022  Gated SPECT left ventricular ejection fraction was calculated to be   74% with normal wall motion and thickening. TID ratio 1.08.    1.  ECG during the infusion did not change.     2.  The myocardial perfusion imaging was abnormal.   3. The abnormality was a large sized, moderate fixed defect involving   the inferior and inferolateral wall suggestive prior infarct without   any reversibility. There was also a small sized mild perfusion defect   involving the mid to distal anterior wall suggestive ischemia. 4.  Overall left ventricular systolic function was normal without   regional wall motion abnormalities. 5.  No transient ischemic dilatation. 6.  High risk general pharmacologic stress test.      Cardiac catheterization:   [facility] 1/11/22 Diane  CONCLUSIONS:  1. Coronary artery disease:  A. Left main. No significant disease. B.  LAD. Heavily calcified proximal and mid vessel with 50% mid vessel stenosis. 60% proximal stenosis of a moderate size first diagonal branch. C.  LCX. 50% ostial narrowing of the AV groove branch in the mid vessel which is a bifurcation lesion with a large marginal branch which itself did not appear to have any significant disease. The AV groove branch of  the left circumflex continues to provide a posterior descending artery branch and the posterolateral branch (codominant vessel). D.  RCA. Codominant vessel with no significant angiographic disease. 2.  Normal left ventricular size with hyperdynamic left ventricular systolic function with an estimated ejection fraction of 75%. 3.  Systemic hypertension. 4.  Elevated left ventricular end-diastolic pressure. Echo Summary 1/19/2023: No previous echo for comparison. Technically adequate study. Left ventricle size is normal.   Mild concentric left ventricular hypertrophy. Ejection fraction is visually estimated at 65%. No regional wall motion abnormalities seen. Indeterminate diastolic function. Right ventricle global systolic function is mildly reduced . Physiologic and/or trace mitral regurgitation is present. Moderate tricuspid regurgitation. RVSP is 47 mmHg. Pulmonary hypertension is mild to moderate . CXR reviewed:      The ASCVD Risk score (Jennifer STUART, et al., 2019) failed to calculate for the following reasons: The patient has a prior MI or stroke diagnosis    ASSESSMENT / PLAN:    1. Acute on chronic diastolic CHF in the setting of significant acute on chronic kidney injury:  Volume management per nephrology  Continue beta-blocker and avoid nephrotoxic agents       2. CAD: Cath 1/11/2022 with moderate non-occlusive CAD treated medically. Continue statin/BB. Not on  ASA due to eliquis. 3. Permanent Afib:   Recurrent despite multiple cardioversion's. Continue beta-blocker   Diltiazem was discontinued        4. Bradycardia issues:   Monitor closely on telemetry. Discontinue diltiazem  Decrease metoprolol dose if persistent bradycardia    5. HTN:   Monitor closely and hold blood pressure meds if hypotensive     6. Lipids:   Continue on statin       7. DM:   Per primary service. 8. COPD/Asthma: On home oxygen. 9. Acute on CKD:   Nephrology following. Patient had hemodialysis catheter placed today    10. Anemia/Thrombocytopenia:  Monitor closely     11. SERENA     12. Parkinson's     13. Breast CA     14. Tobacco use                   Thank you for allowing me to participate in your patient's care. Please feel free to contact me if you have any questions or concerns.     Robson Nicole MD  [facility]) Cardiology

## 2023-02-10 NOTE — PROGRESS NOTES
Department of Internal Medicine  General Internal Medicine  Attending Progress Note  Chief Complaint   Patient presents with    Respiratory Distress     Normally on 3L NC, came in on CPAP, given total of 50mcg push dose epi for low BP by EMS     SUBJECTIVE:    Reports that she is doing and feeling better today. Currently undergoing dialysis. Bp on the low side. SBP of 96 at time of eval. Added Albumin to see if this helps.      OBJECTIVE      Medications    Current Facility-Administered Medications: digoxin (LANOXIN) injection 125 mcg, 125 mcg, IntraVENous, Daily  metoprolol succinate (TOPROL XL) extended release tablet 50 mg, 50 mg, Oral, BID  sodium chloride (OCEAN, BABY AYR) 0.65 % nasal spray 1 spray, 1 spray, Each Nostril, PRN  [Held by provider] dilTIAZem (CARDIZEM) tablet 60 mg, 60 mg, Oral, 3 times per day  sucralfate (CARAFATE) tablet 1 g, 1 g, Oral, 4x Daily  rOPINIRole (REQUIP) tablet 1 mg, 1 mg, Oral, TID  atorvastatin (LIPITOR) tablet 20 mg, 20 mg, Oral, Nightly  ipratropium-albuterol (DUONEB) nebulizer solution 3 mL, 1 vial, Inhalation, 4x Daily  [Held by provider] insulin glargine (LANTUS) injection vial 13 Units, 13 Units, SubCUTAneous, BID  loperamide (IMODIUM) capsule 2 mg, 2 mg, Oral, 4x Daily PRN  [Held by provider] LORazepam (ATIVAN) tablet 0.5 mg, 0.5 mg, Oral, Nightly  insulin lispro (HUMALOG) injection vial 0-4 Units, 0-4 Units, SubCUTAneous, TID WC  insulin lispro (HUMALOG) injection vial 0-4 Units, 0-4 Units, SubCUTAneous, Nightly  budesonide (PULMICORT) nebulizer suspension 500 mcg, 0.5 mg, Nebulization, BID  sodium chloride flush 0.9 % injection 10 mL, 10 mL, IntraVENous, 2 times per day  sodium chloride flush 0.9 % injection 10 mL, 10 mL, IntraVENous, PRN  0.9 % sodium chloride infusion, , IntraVENous, PRN  promethazine (PHENERGAN) tablet 12.5 mg, 12.5 mg, Oral, Q6H PRN **OR** ondansetron (ZOFRAN) injection 4 mg, 4 mg, IntraVENous, Q6H PRN  polyethylene glycol (GLYCOLAX) packet 17 g, 17 g, Oral, Daily PRN  acetaminophen (TYLENOL) tablet 650 mg, 650 mg, Oral, Q6H PRN **OR** acetaminophen (TYLENOL) suppository 650 mg, 650 mg, Rectal, Q6H PRN  miconazole (MICOTIN) 2 % powder, , Topical, BID  arformoterol tartrate (BROVANA) nebulizer solution 15 mcg, 15 mcg, Nebulization, BID  pantoprazole (PROTONIX) tablet 40 mg, 40 mg, Oral, QAM AC  glucose chewable tablet 16 g, 4 tablet, Oral, PRN  dextrose bolus 10% 125 mL, 125 mL, IntraVENous, PRN **OR** dextrose bolus 10% 250 mL, 250 mL, IntraVENous, PRN  glucagon (rDNA) injection 1 mg, 1 mg, SubCUTAneous, PRN  dextrose 10 % infusion, , IntraVENous, Continuous PRN  [Held by provider] apixaban (ELIQUIS) tablet 5 mg, 5 mg, Oral, BID  mirtazapine (REMERON) tablet 7.5 mg, 7.5 mg, Oral, Nightly  carbidopa-levodopa (SINEMET CR)  MG per extended release tablet 1 tablet, 1 tablet, Oral, TID  sertraline (ZOLOFT) tablet 50 mg, 50 mg, Oral, Daily  montelukast (SINGULAIR) tablet 10 mg, 10 mg, Oral, Nightly  Physical    VITALS:  BP (!) 122/50   Pulse 68   Temp 97.5 °F (36.4 °C)   Resp 24   Ht 5' (1.524 m)   Wt 232 lb 2.3 oz (105.3 kg)   SpO2 96%   BMI 45.34 kg/m²   CONSTITUTIONAL:  awake, cooperative, and no apparent distress  EYES:  extra-ocular muscles intact  ENT:  normocepalic, without obvious abnormality  NECK:  supple, symmetrical, trachea midline  LUNGS:  no increased work of breathing, no retractions, and clear to auscultation  CARDIOVASCULAR:  normal apical pulses and normal S1 and S2  ABDOMEN:  normal bowel sounds, non-distended, and non-tender  MUSCULOSKELETAL:  full range of motion noted  NEUROLOGIC:  Mental Status Exam:  Level of Alertness:   awake  Orientation:   person, place, time  SKIN:  no bruising or bleeding  Data    CBC:   Lab Results   Component Value Date/Time    WBC 4.5 02/08/2023 04:49 AM    RBC 3.37 02/08/2023 04:49 AM    HGB 8.3 02/08/2023 04:49 AM    HCT 30.1 02/08/2023 04:49 AM    MCV 89.3 02/08/2023 04:49 AM    MCH 24.6 02/08/2023  04:49 AM    MCHC 27.6 02/08/2023 04:49 AM    RDW 17.3 02/08/2023 04:49 AM     02/08/2023 04:49 AM    MPV 11.5 02/08/2023 04:49 AM     BMP:    Lab Results   Component Value Date/Time     02/10/2023 07:33 AM    K 3.8 02/10/2023 07:33 AM     02/10/2023 07:33 AM    CO2 37 02/10/2023 07:33 AM    BUN 23 02/10/2023 07:33 AM    LABALBU 3.4 02/09/2023 10:12 AM    CREATININE 1.5 02/10/2023 07:33 AM    CALCIUM 7.8 02/10/2023 07:33 AM    GFRAA >60 10/16/2022 09:20 AM    LABGLOM 37 02/10/2023 07:33 AM    GLUCOSE 101 02/10/2023 07:33 AM       ASSESSMENT AND PLAN      Acute on chronic respiratory failure with hypoxia and hypercapnia   Palliative care encounter  CKD now on dialysis: second episode of dialysis today. Low bp today. Added albumin to help with BP. Appreciate nephro recommendation  DM type 2 complicated with nephropathy: continue home meds. Hold insulin for now. BGL is with adequate control  Hypertension: now hypotension  Anemia: overall stable. Continue to monitor. CHF with diastolic dysfunction: continue meds per cardiology.

## 2023-02-10 NOTE — PROGRESS NOTES
Pulmonary/Critical Care Progress Note    We are following patient for acute hypoxemic and hypercapnic respiratory failure, morbid obesity, obstructive sleep apnea, diastolic dysfunction, COPD, diabetes mellitus type 2, Parkinson's disease, HFpEF      SUBJECTIVE:  Patient has been stable overnight. She underwent hemodialysis today. 3 Ls of fluid were removed. She remains intermittently on BiPAP. She has no chest pain, fever, chills, rigors. MEDICATIONS:   digoxin  125 mcg IntraVENous Daily    metoprolol succinate  50 mg Oral BID    [Held by provider] dilTIAZem  60 mg Oral 3 times per day    sucralfate  1 g Oral 4x Daily    rOPINIRole  1 mg Oral TID    atorvastatin  20 mg Oral Nightly    ipratropium-albuterol  1 vial Inhalation 4x Daily    [Held by provider] insulin glargine  13 Units SubCUTAneous BID    [Held by provider] LORazepam  0.5 mg Oral Nightly    insulin lispro  0-4 Units SubCUTAneous TID WC    insulin lispro  0-4 Units SubCUTAneous Nightly    budesonide  0.5 mg Nebulization BID    sodium chloride flush  10 mL IntraVENous 2 times per day    miconazole   Topical BID    arformoterol tartrate  15 mcg Nebulization BID    pantoprazole  40 mg Oral QAM AC    [Held by provider] apixaban  5 mg Oral BID    mirtazapine  7.5 mg Oral Nightly    carbidopa-levodopa  1 tablet Oral TID    sertraline  50 mg Oral Daily    montelukast  10 mg Oral Nightly      sodium chloride 25 mL/hr at 02/04/23 0222    dextrose       sodium chloride, loperamide, sodium chloride flush, sodium chloride, promethazine **OR** ondansetron, polyethylene glycol, acetaminophen **OR** acetaminophen, glucose, dextrose bolus **OR** dextrose bolus, glucagon (rDNA), dextrose      REVIEW OF SYSTEMS:  Constitutional: Denies fever, weight loss, night sweats, and fatigue  Skin: Denies pigmentation, dark lesions, and rashes   HEENT: Denies hearing loss, tinnitus, ear drainage, epistaxis, sore throat, and hoarseness.   Cardiovascular: Denies palpitations, chest pain, and chest pressure. Respiratory: Denies cough, dyspnea at rest, hemoptysis, apnea, and choking. Gastrointestinal: Denies nausea, vomiting, poor appetite, diarrhea, heartburn or reflux  Genitourinary: Denies dysuria, frequency, urgency or hematuria  Musculoskeletal: Denies myalgias, muscle weakness, and bone pain  Neurological: Denies dizziness, vertigo, headache, and focal weakness  Psychological: Denies anxiety and depression  Endocrine: Denies heat intolerance and cold intolerance  Hematopoietic/Lymphatic: Denies bleeding problems and blood transfusions    OBJECTIVE:  Vitals:    02/10/23 1315   BP: (!) (P) 107/56   Pulse: (P) 82   Resp: (P) 24   Temp: (P) 97.3 °F (36.3 °C)   SpO2: (P) 96%     FiO2 : 35 %  O2 Flow Rate (L/min): 5 L/min  O2 Device: (P) PAP (positive airway pressure)    PHYSICAL EXAM:  Constitutional: No fever, chills, diaphoresis  Skin: Venous stasis changes lower extremities  HEENT: Mucous membranes are dry  Neck: No JVD, lymphadenopathy, thyromegaly  Cardiovascular: S1, S2 irregular. No S3 or rubs present  Respiratory: Decreased breath sounds right lower lung field, normal breath sounds on left  Gastrointestinal: Soft, obese, nontender  Genitourinary: No grossly bloody urine  Extremities: No clubbing, cyanosis, or edema  Neurological: Awake, alert, oriented x3. No evidence of focal motor or sensory deficits  Psychological: Appropriate affect at times but not always.     LABS:  WBC   Date Value Ref Range Status   02/08/2023 4.5 4.5 - 11.5 E9/L Final   02/06/2023 6.1 4.5 - 11.5 E9/L Final   01/30/2023 7.2 4.5 - 11.5 E9/L Final     Hemoglobin   Date Value Ref Range Status   02/08/2023 8.3 (L) 11.5 - 15.5 g/dL Final   02/06/2023 7.8 (L) 11.5 - 15.5 g/dL Final   01/30/2023 7.7 (L) 11.5 - 15.5 g/dL Final     Hematocrit   Date Value Ref Range Status   02/08/2023 30.1 (L) 34.0 - 48.0 % Final   02/06/2023 27.5 (L) 34.0 - 48.0 % Final   01/30/2023 26.6 (L) 34.0 - 48.0 % Final     MCV   Date Value Ref Range Status   02/08/2023 89.3 80.0 - 99.9 fL Final   02/06/2023 88.1 80.0 - 99.9 fL Final   01/30/2023 89.3 80.0 - 99.9 fL Final     Platelets   Date Value Ref Range Status   02/08/2023 159 130 - 450 E9/L Final   02/06/2023 135 130 - 450 E9/L Final   01/30/2023 75 (L) 130 - 450 E9/L Final     Sodium   Date Value Ref Range Status   02/10/2023 145 132 - 146 mmol/L Final   02/09/2023 149 (H) 132 - 146 mmol/L Final   02/08/2023 146 132 - 146 mmol/L Final     Potassium   Date Value Ref Range Status   02/09/2023 3.2 (L) 3.5 - 5.0 mmol/L Final   02/08/2023 3.1 (L) 3.5 - 5.0 mmol/L Final   02/07/2023 3.8 3.5 - 5.0 mmol/L Final     Potassium reflex Magnesium   Date Value Ref Range Status   02/10/2023 3.8 3.5 - 5.0 mmol/L Final   02/09/2023 3.2 (L) 3.5 - 5.0 mmol/L Final   02/05/2023 4.3 3.5 - 5.0 mmol/L Final     Chloride   Date Value Ref Range Status   02/10/2023 100 98 - 107 mmol/L Final   02/09/2023 98 98 - 107 mmol/L Final   02/08/2023 96 (L) 98 - 107 mmol/L Final     CO2   Date Value Ref Range Status   02/10/2023 37 (H) 22 - 29 mmol/L Final   02/09/2023 44 (HH) 22 - 29 mmol/L Final   02/08/2023 40 (H) 22 - 29 mmol/L Final     BUN   Date Value Ref Range Status   02/10/2023 23 6 - 23 mg/dL Final   02/09/2023 41 (H) 6 - 23 mg/dL Final   02/08/2023 41 (H) 6 - 23 mg/dL Final     Creatinine   Date Value Ref Range Status   02/10/2023 1.5 (H) 0.5 - 1.0 mg/dL Final   02/09/2023 2.2 (H) 0.5 - 1.0 mg/dL Final   02/08/2023 2.2 (H) 0.5 - 1.0 mg/dL Final     Glucose   Date Value Ref Range Status   02/10/2023 101 (H) 74 - 99 mg/dL Final   02/09/2023 112 (H) 74 - 99 mg/dL Final   02/08/2023 117 (H) 74 - 99 mg/dL Final     Calcium   Date Value Ref Range Status   02/10/2023 7.8 (L) 8.6 - 10.2 mg/dL Final   02/09/2023 8.2 (L) 8.6 - 10.2 mg/dL Final   02/08/2023 8.1 (L) 8.6 - 10.2 mg/dL Final     Total Protein   Date Value Ref Range Status   02/09/2023 5.4 (L) 6.4 - 8.3 g/dL Final   02/06/2023 5.0 (L) 6.4 - 8.3 g/dL Final 02/05/2023 5.2 (L) 6.4 - 8.3 g/dL Final     Albumin   Date Value Ref Range Status   02/09/2023 3.4 (L) 3.5 - 5.2 g/dL Final   02/06/2023 2.9 (L) 3.5 - 5.2 g/dL Final   02/05/2023 3.0 (L) 3.5 - 5.2 g/dL Final     Total Bilirubin   Date Value Ref Range Status   02/09/2023 0.6 0.0 - 1.2 mg/dL Final   02/06/2023 0.4 0.0 - 1.2 mg/dL Final   02/05/2023 0.3 0.0 - 1.2 mg/dL Final     Alkaline Phosphatase   Date Value Ref Range Status   02/09/2023 55 35 - 104 U/L Final   02/06/2023 60 35 - 104 U/L Final   02/05/2023 61 35 - 104 U/L Final     AST   Date Value Ref Range Status   02/09/2023 8 0 - 31 U/L Final   02/06/2023 7 0 - 31 U/L Final   02/05/2023 8 0 - 31 U/L Final     ALT   Date Value Ref Range Status   02/09/2023 <5 0 - 32 U/L Final   02/06/2023 <5 0 - 32 U/L Final   02/05/2023 <5 0 - 32 U/L Final     Est, Glom Filt Rate   Date Value Ref Range Status   02/10/2023 37 >=60 mL/min/1.73 Final     Comment:     Pediatric calculator link  [email or web address]/gfr_calculatorped  Effective Oct 3, 2022  These results are not intended for use in patients  <25years of age. eGFR results are calculated without  a race factor using the 2021 CKD-EPI equation. Careful  clinical correlation is recommended, particularly when  comparing to results calculated using previous equations. The CKD-EPI equation is less accurate in patients with  extremes of muscle mass, extra-renal metabolism of  creatinine, excessive creatinine ingestion, or following  therapy that affects renal tubular secretion. 02/09/2023 23 >=60 mL/min/1.73 Final     Comment:     Pediatric calculator link  [employer] [email or web address]/gfr_calculatorped  Effective Oct 3, 2022  These results are not intended for use in patients  <25years of age. eGFR results are calculated without  a race factor using the 2021 CKD-EPI equation.   Careful  clinical correlation is recommended, particularly when  comparing to results calculated using previous equations. The CKD-EPI equation is less accurate in patients with  extremes of muscle mass, extra-renal metabolism of  creatinine, excessive creatinine ingestion, or following  therapy that affects renal tubular secretion. 02/08/2023 23 >=60 mL/min/1.73 Final     Comment:     Pediatric calculator link  [email or web address]/gfr_calculatorped  Effective Oct 3, 2022  These results are not intended for use in patients  <25years of age. eGFR results are calculated without  a race factor using the 2021 CKD-EPI equation. Careful  clinical correlation is recommended, particularly when  comparing to results calculated using previous equations. The CKD-EPI equation is less accurate in patients with  extremes of muscle mass, extra-renal metabolism of  creatinine, excessive creatinine ingestion, or following  therapy that affects renal tubular secretion. GFR    Date Value Ref Range Status   10/16/2022 >60  Final   10/14/2022 59  Final   10/13/2022 >60  Final     Magnesium   Date Value Ref Range Status   02/09/2023 1.6 1.6 - 2.6 mg/dL Final   02/08/2023 1.6 1.6 - 2.6 mg/dL Final   02/07/2023 1.6 1.6 - 2.6 mg/dL Final     Phosphorus   Date Value Ref Range Status   02/08/2023 4.1 2.5 - 4.5 mg/dL Final   02/07/2023 4.3 2.5 - 4.5 mg/dL Final   02/06/2023 3.6 2.5 - 4.5 mg/dL Final     No results for input(s): PH, PO2, PCO2, HCO3, BE, O2SAT in the last 72 hours. RADIOLOGY:  IR FLUORO GUIDED CVA DEVICE PLMT/REPLACE/REMOVAL   Final Result   Successful placement of a tunneled dialysis catheter via the right internal   jugular vein. Administration of conscious sedation. IR ULTRASOUND GUIDANCE VASCULAR ACCESS   Final Result   Successful placement of a tunneled dialysis catheter via the right internal   jugular vein. Administration of conscious sedation.          XR CHEST PORTABLE   Final Result   Large right pleural effusion with likely adjacent compressive atelectasis. Suspected atelectasis at the posterior basal segment of the left lower lobe. XR CHEST PORTABLE   Final Result   Lesser amount of aeration of the right lung         XR CHEST PORTABLE   Final Result   1. Slight improved aeration of the right lung when compared to prior study   2. The left lung is grossly clear   3. Cardiomegaly         XR CHEST PORTABLE   Final Result   1. New opacification seen within the right lung base which could represent   partial collapse of the right lower lobe   2. Reaccumulating right pleural effusion   3. Right upper lobe airspace disease   4. There is no pneumothorax   5. The left lung is clear. XR CHEST 1 VIEW   Final Result   Improved ventilation of the right lung, status post thoracentesis with   possible tiny less than 5% right apical pneumothorax. .      Cavitary lesion in the right midlung field which may either represent a   necrotic malignancy versus pneumatosis. Consider CT scan. IR GUIDED THORACENTESIS PLEURAL   Final Result   Successful ultrasound guided thoracentesis. XR CHEST PORTABLE   Final Result   Complete opacification of the right hemithorax related to pleural effusion. Modest contralateral infiltrate and or atelectasis at the left lower chest.         CT ABDOMEN PELVIS WO CONTRAST Additional Contrast? None   Final Result   No nephroureterolithiasis or hydronephrosis. Splenomegaly. Small volume of perihepatic ascites. At least moderate-sized partially imaged right pleural effusion with   bibasilar atelectasis. Several locules of gas which appear to be within the endometrium at the level   of the uterine fundus, of uncertain etiology or clinical significance. Please correlate clinically. Diffuse anasarca throughout the soft tissues. Cardiomegaly. XR CHEST PORTABLE   Final Result   Stable right pleural effusion.                  PROBLEM LIST:  Principal Problem:    Acute on chronic respiratory failure with hypoxia and hypercapnia (HCC)  Active Problems:    Palliative care encounter  Resolved Problems:    * No resolved hospital problems.  *      IMPRESSION:  Right pleural effusion, possibly increasing  Severe diastolic dysfunction  Acute kidney injury about the same as yesterday  Cor pulmonale  Obstructive sleep apnea  Acute superimposed on chronic hypoxemic and hypercapnic respiratory failure  Hypokalemia    PLAN:  Potassium replacement per nephrology  Diuretics and hemodialysis per nephrology  Optimize volume status with hemodialysis  BiPAP while sleeping and napping  Wean FiO2 as able    Electronically signed by Nichol Warren MD on 2/10/2023 at 2:10 PM

## 2023-02-10 NOTE — PROGRESS NOTES
[facility] MD Latesha Oquendo MD Cecil Romance, MD Signa Judge, BRADLEY Devlin, NUNU Piña CNP  Progress Note    2/10/2023    SUBJECTIVE:   1/20: Feeling little better today. Denies new complaint. Still tachypnea, though denies dyspnea no cp/palp Appetite ok ROS otherwise unremarkable    1//21 seen in her room on o2/nc bp stable weak poor po intake   2+ edema in LE     1/22 charts reviewed . On bipap    1/23 : on o2/nc . Still look hypervolemic     1/24 seen in her room comfortable o2/nc . Still with LE edema which seems to be multifactorial and will likely need to accept having some edema on board     1/25 sitting on edge of the bed working with pt . Still with considerable edema in LEs   BP stable     1/26 seen in her room reports of SOB with minimal activity , LE edema still signficant . 1/27 good UO On 5 L/o2/nc  Still with sob with activity Tachycardiac with low functional capacity     1/28: Asleep, resting and breathing comfortably on nasal cannula. Remains edematous. Ongoing fatigue and generalized weakness. 1/29: Hypotensive last evening, Bumex drip stopped, IV normal saline bolus administered to effect. Breathing little more easily though still on AVAPS. To be downgraded to CPAP shortly. Thirsty. Still markedly edematous. 1/30: Somnolent, though arousable. On CPAP. Ongoing fatigue and malaise with generalized weakness    1/31 seen in her room , skin wrinkling in LE on HFNC . BP on lower side  For thoracentesis today   Dw RN .     2/1: Seen while laying in bed, no acute distress. Tells me that she is thirsty. She is on heated high flow nasal canula. Feels that her breathing has improved slightly. Plan is for thoracentesis later this afternoon, could not be done yesterday due to elevated INR. Still with skin wrinkling to bilateral lower extremities.      2/2 1 L removed with thoracentesis   O2 requirement better on 8 L o2/nc Very weak and deconditioned   Labile BP numbers    2/2 o2/nc LE UO ok remain weak decondtioned . Edema in LE getting somewhat worse    2/4 remain with significant depdent edema deconditioned in bed     2/5: Discussed with respiratory therapist: Just put on her CPAP after having tolerated nasal cannula throughout most of the day. Remains bedbound, severely weak and debilitated. Remains massively swollen, little change in the last 5 days. 2/6: Status quo. Somnolent but arousable. Desats and dyspneic without AVAPS. Remains massively edematous    2/7:  resp status is status quo. More awake, alert, interactive today. Resumed bumex gtt yesterday. 2/8 for dialysis line in am   Alert oriented   Remain markedly hypervolemic     2/9: Sitting up in bed, on Bipap. Family is present at bedside. Markedly edematous. She is asking me if [facility] placement will hurt. She reports dyspnea, but feels better on Bipap. She denies chest pain or palpitations. Plan is for [facility] catheter placement with IR today. 2/10: Seen while on HD. Tolerating without complications. Tells me she is feeling much better. Still edematous, but edema has improved substantially. She is on nasal canula, eating breakfast. She had 2.7 L removed yesterday and 3L removed today. PROBLEM LIST:    Principal Problem:    Acute on chronic respiratory failure with hypoxia and hypercapnia (HCC)  Active Problems:    Palliative care encounter  Resolved Problems:    * No resolved hospital problems. *         DIET:    ADULT DIET; Regular; 3 carb choices (45 gm/meal); Low Fat/Low Chol/High Fiber/2 gm Na;  Moderately Thick (Honey); 1200 ml     MEDS (scheduled):    digoxin  125 mcg IntraVENous Daily    metoprolol succinate  50 mg Oral BID    [Held by provider] dilTIAZem  60 mg Oral 3 times per day    sucralfate  1 g Oral 4x Daily    rOPINIRole  1 mg Oral TID    atorvastatin  20 mg Oral Nightly    ipratropium-albuterol  1 vial Inhalation 4x Daily    [Held by provider] insulin glargine  13 Units SubCUTAneous BID    [Held by provider] LORazepam  0.5 mg Oral Nightly    insulin lispro  0-4 Units SubCUTAneous TID WC    insulin lispro  0-4 Units SubCUTAneous Nightly    budesonide  0.5 mg Nebulization BID    sodium chloride flush  10 mL IntraVENous 2 times per day    miconazole   Topical BID    arformoterol tartrate  15 mcg Nebulization BID    pantoprazole  40 mg Oral QAM AC    [Held by provider] apixaban  5 mg Oral BID    mirtazapine  7.5 mg Oral Nightly    carbidopa-levodopa  1 tablet Oral TID    sertraline  50 mg Oral Daily    montelukast  10 mg Oral Nightly       MEDS (infusions):   sodium chloride 25 mL/hr at 02/04/23 0222    dextrose         MEDS (prn):  sodium chloride, loperamide, sodium chloride flush, sodium chloride, promethazine **OR** ondansetron, polyethylene glycol, acetaminophen **OR** acetaminophen, glucose, dextrose bolus **OR** dextrose bolus, glucagon (rDNA), dextrose    PHYSICAL EXAM:     Patient Vitals for the past 24 hrs:   BP Temp Temp src Pulse Resp SpO2 Weight   02/10/23 1315 (!) 107/56 97.3 °F (36.3 °C) -- 82 24 96 % --   02/10/23 1200 (!) 122/50 -- -- 68 24 96 % --   02/10/23 1130 (!) 98/48 -- -- 75 24 100 % --   02/10/23 1100 (!) 113/59 -- -- 96 24 96 % --   02/10/23 1045 (!) 118/54 -- -- 98 24 98 % --   02/10/23 1030 (!) 108/54 -- -- 82 24 98 % --   02/10/23 1000 122/66 -- -- 82 18 100 % --   02/10/23 0959 -- -- -- -- -- 100 % --   02/10/23 0930 (!) 95/59 -- -- 74 18 100 % --   02/10/23 0915 (!) 99/58 -- -- 74 18 100 % --   02/10/23 0902 (!) 103/57 -- -- 77 -- -- --   02/10/23 0845 (!) 103/57 97.5 °F (36.4 °C) -- 70 18 100 % 232 lb 2.3 oz (105.3 kg)   02/10/23 0750 (!) 112/53 97.5 °F (36.4 °C) Axillary 85 19 98 % --   02/10/23 0612 -- -- -- -- 24 -- --   02/10/23 0611 -- -- -- -- -- 98 % --   02/10/23 0541 (!) 92/58 97.7 °F (36.5 °C) Axillary 70 20 97 % --   02/10/23 0133 -- -- -- -- 22 -- --   02/09/23 2255 (!) 90/54 97.3 °F (36.3 °C) -- 69 22 (!) 89 % --   02/09/23 2159 -- -- -- -- 25 -- --   02/09/23 2144 (!) 114/57 97.7 °F (36.5 °C) -- 80 20 99 % 232 lb 2.3 oz (105.3 kg)   02/09/23 2130 (!) 88/46 -- -- (!) 105 -- -- --   02/09/23 2115 (!) 104/57 -- -- 72 -- -- --   02/09/23 2100 (!) 106/55 -- -- 76 -- -- --   02/09/23 2046 (!) 95/53 -- -- 72 -- -- --   02/09/23 2015 (!) 104/59 -- -- 77 -- -- --   02/09/23 2000 (!) 96/54 -- -- 74 -- -- --   02/09/23 1945 (!) 96/49 -- -- 57 -- -- --   02/09/23 1930 (!) 102/54 -- -- 63 -- -- --   02/09/23 1915 (!) 94/54 -- -- 58 -- -- --   02/09/23 1900 (!) 104/56 -- -- 82 -- -- --   02/09/23 1845 (!) 96/56 -- -- 76 -- -- --   02/09/23 1830 (!) 110/59 -- -- 73 -- -- --   02/09/23 1815 (!) 101/56 -- -- 78 -- -- --   02/09/23 1800 (!) 106/42 -- -- 69 -- -- --   02/09/23 1738 125/66 -- -- 73 -- -- --   02/09/23 1729 (!) 146/53 97.7 °F (36.5 °C) -- 79 20 97 % --   02/09/23 1630 (!) 117/58 97.8 °F (36.6 °C) Axillary 73 18 93 % --   02/09/23 1459 (!) 96/54 -- -- 73 -- 95 % --     @      Intake/Output Summary (Last 24 hours) at 2/10/2023 1448  Last data filed at 2/10/2023 0432  Gross per 24 hour   Intake 300 ml   Output 3200 ml   Net -2900 ml           Wt Readings from Last 3 Encounters:   02/10/23 232 lb 2.3 oz (105.3 kg)   01/16/23 259 lb (117.5 kg)   01/16/23 259 lb (117.5 kg)     Age-appropriate morbidly obese white woman, though easily arousable, no apparent distress  NC/AT EOMI sclera conjunctive are clear and anicteric mucous membranes are moist  Neck soft supple trachea midline  Lungs clear to ausculation bilaterally, diminished in the bases. Poor inspiratory effort. Regular rhythm normal S1 and S2  Abdomen soft nontender nondistended normal active bowel sounds. Abdominal pannus has substantial edema  Distal extremities, thighs, sacrum have substantial chronic type nonpitting edema, +1/trace BLE edema.    Pulses 1+ x4  Moves all 4 extremities  Cranial nerves II through XII grossly intact  Awake, alert, interactive and appropriate  Skin tone consistent with chronic venous stasis distally      DATA:    Recent Labs     02/08/23  0449   WBC 4.5   HGB 8.3*   HCT 30.1*   MCV 89.3              Recent Labs     02/08/23  0449 02/09/23  1012 02/10/23  0733    149* 145   K 3.1* 3.2*  3.2* 3.8   CL 96* 98 100   CO2 40* 44* 37*   MG 1.6 1.6  --    PHOS 4.1  --   --    BUN 41* 41* 23   CREATININE 2.2* 2.2* 1.5*   ALT  --  <5  --    AST  --  8  --    BILITOT  --  0.6  --    ALKPHOS  --  55  --          Lab Results   Component Value Date    LABPROT 0.5 (H) 01/18/2023    LABPROT 0.5 01/18/2023     On CT no hydro/signs of obstruction     Urine studies  U Na 21, U Cl 23 U prot:cr ratio 0.5    ASSESSMENT / RECOMMENDATIONS:       Assessment  1. Acute kidney injury urine lytes support decrease effective volume     2. CKD stage III, Baseline creatinine 1.4 to 1.7 mg/dL, secondary to diabetic nephropathy and renal microvascular atherosclerotic disease. 0.5 g proteinuria     3. Anemia due to CKD, phlebotomy associated prolonged hospitalization     4. Acute hypercapnic and hypoxic respiratory failure due to COPD exacerbation and pneumonia. Does not appear hypervolemic. 5.  Morbid obesity, BMI 50.6 kg per metered square     6. Edema/anasarca, chronic, multifactorial, due to venous insufficiency in the setting of morbid obesity, relative immobility, likely diastolic dysfunction though it was \"indeterminate\" on echo, the does have pulmonary hypertension, mild right-sided heart failure    7. Hypotension    8. Elevated bicarbonate represents compensation for chronic respiratory acidosis, as well as likely contraction alkalosis due to diuresis on the Bumex drip       Remains massively hypervolemic with little improvement over the past week with Bumex drip, metolazone/Diuril, Diamox.   I discussed with her using dialysis machine to ultrafiltrate --would involve placement of tunneled dialysis line, followed by daily ultrafiltration treatments of at least 4 hours, longer if we have the manpower, in effort to unload what is conservatively 25 to 30 L of extravascular water. Chest xray-large right pleural effusion   Tolerating hemodialysis without complications. Edema has improved substantially. 2.7 L removed yesterday   3 L removed today     Recommendations    -2nd hemodialysis treatment today  -daily (except Sunday) UFWOD.   May need hd at some point but for now will plan just isolated ultrafiltration 4 L per tx until at dry weight  -follow morning labs, likely ultrafiltration tomorrow     Electronically signed by RASHARD Costa - BRADLEY on 2/10/2023

## 2023-02-10 NOTE — FLOWSHEET NOTE
02/10/23 1315   Treatment   Treatment Number 2   Time On 0859   Time Off 1259   Treatment Goal 3000   Observations & Evaluations   Level of Consciousness 0   Oriented X 3   Heart Rhythm Irregular   Respiratory Quality/Effort Labored   O2 Device PAP (positive airway pressure)   Bilateral Breath Sounds Diminished   Skin Color Pale   Skin Condition/Temp Warm   Bowel Sounds (All Quadrants) Present   Edema Generalized;Right upper extremity; Left upper extremity;Right lower extremity; Left lower extremity   Edema Generalized +1   Comments HD completed as ordered. 3 liters removed   Vital Signs   BP (!) 107/56   Temp 97.3 °F (36.3 °C)   Heart Rate 82   Resp 24   SpO2 96 %   Hemodialysis Central Access Right Subclavian   Placement Date: 02/09/23   Present on Admission/Arrival: No  Inserted by: IR  Orientation: Right  Access Location: Subclavian   Continued need for line?  Yes   Site Assessment Clean, dry & intact   Venous Lumen Status Heparin locked   Arterial Lumen Status Heparin locked   Alcohol Cap Used No   Line Care Ports disinfected   Dressing Type Antimicrobial   Date of Last Dressing Change 02/10/23   Dressing Status Clean, dry & intact   Dressing Intervention Dressing changed   Dressing Change Due 02/17/23

## 2023-02-10 NOTE — CARE COORDINATION
SS NOTE: COVID NEGATIVE 1/17, PT WILL NEED A RAPID NEGATIVE COVID TEST IF SHE RETUR[place] [facility] [place]. Pt is on 5 liters of O2 and bipap at 35 at night. Pt recd a tunneled HD catheter yesterday. Initial HD tx today. Pt has a peripheral line. Pt's Eliquis in on hold. She has a genao catheter. Cardiology, ID, PT/OT and Palliative care are all involved. [facility] is still following pt. SS to continue. ESTHER Winter.2/10/2023.12:49PM.

## 2023-02-10 NOTE — FLOWSHEET NOTE
MD notified of patients CO2 levels and pt wanting to come off the bipap so she can eat. He stated that it is fine to take her off place on 7 liters and see how she does. Pt is currently being set up on dialysis at the bedside. Will continue to monitor.

## 2023-02-10 NOTE — FLOWSHEET NOTE
02/09/23 2144   Vital Signs   BP (!) 114/57   Temp 97.7 °F (36.5 °C)   Heart Rate 80   Resp 20   SpO2 99 %   Weight 232 lb 2.3 oz (105.3 kg)   Weight Method Estimated  (estimated from last recorded weight; PICU needs to weigh patient daily at HS)   Percent Weight Change -2.46   Pain Assessment   Pain Assessment None - Denies Pain   Pain Level 0   Post-Hemodialysis Assessment   Post-Treatment Procedures Blood returned;Catheter capped, clamped and heparinized x 2 ports   Machine Disinfection Process Acid/Vinegar Clean;Heat Disinfect; Exterior Machine Disinfection   Rinseback Volume (ml) 300 ml   Blood Volume Processed (Liters) 58.4 l/min   Dialyzer Clearance Moderately streaked   Duration of Treatment (minutes) 240 minutes   Heparin Amount Administered During Treatment (mL) 0 mL   Hemodialysis Intake (ml) 300 ml   Hemodialysis Output (ml) 3000 ml   NET Removed (ml) 2700   Tolerated Treatment Good   Patient Response to Treatment Tolerated well; HD complete; terminated per [facility] p&p; all blood rinsed back; aseptically closed RIJ TDC with heparin, clamped and capped; net UF 2.7 L; BVP 58.4 L; A-B profile during treatment with ending B profile; refill noted; post HD VSS; patient remains on Bipap; she has no complaints or needs upon my departure; report given to Sylvia St RN   Bilateral Breath Sounds Diminished   Edema Generalized;Right upper extremity; Left upper extremity;Right lower extremity; Left lower extremity   Edema Generalized +2;Non-pitting   RUE Edema +1;Pitting   LUE Edema +2;Pitting   RLE Edema +1;Pitting   LLE Edema +1;Pitting   Physician Notified No   Time Off 2138   Patient Disposition Other (Comment)  (Pt remains in her room)

## 2023-02-11 LAB
ANION GAP SERPL CALCULATED.3IONS-SCNC: 8 MMOL/L (ref 7–16)
BUN BLDV-MCNC: 13 MG/DL (ref 6–23)
CALCIUM SERPL-MCNC: 7.9 MG/DL (ref 8.6–10.2)
CHLORIDE BLD-SCNC: 100 MMOL/L (ref 98–107)
CO2: 34 MMOL/L (ref 22–29)
CREAT SERPL-MCNC: 1.5 MG/DL (ref 0.5–1)
GFR SERPL CREATININE-BSD FRML MDRD: 37 ML/MIN/1.73
GLUCOSE BLD-MCNC: 103 MG/DL (ref 74–99)
MAGNESIUM: 1.7 MG/DL (ref 1.6–2.6)
METER GLUCOSE: 111 MG/DL (ref 74–99)
METER GLUCOSE: 135 MG/DL (ref 74–99)
METER GLUCOSE: 135 MG/DL (ref 74–99)
METER GLUCOSE: 145 MG/DL (ref 74–99)
POTASSIUM REFLEX MAGNESIUM: 3.4 MMOL/L (ref 3.5–5)
SODIUM BLD-SCNC: 142 MMOL/L (ref 132–146)

## 2023-02-11 PROCEDURE — 6370000000 HC RX 637 (ALT 250 FOR IP): Performed by: INTERNAL MEDICINE

## 2023-02-11 PROCEDURE — 2580000003 HC RX 258: Performed by: INTERNAL MEDICINE

## 2023-02-11 PROCEDURE — 80048 BASIC METABOLIC PNL TOTAL CA: CPT

## 2023-02-11 PROCEDURE — 99232 SBSQ HOSP IP/OBS MODERATE 35: CPT | Performed by: INTERNAL MEDICINE

## 2023-02-11 PROCEDURE — 6360000002 HC RX W HCPCS: Performed by: INTERNAL MEDICINE

## 2023-02-11 PROCEDURE — 94660 CPAP INITIATION&MGMT: CPT

## 2023-02-11 PROCEDURE — 82962 GLUCOSE BLOOD TEST: CPT

## 2023-02-11 PROCEDURE — P9047 ALBUMIN (HUMAN), 25%, 50ML: HCPCS | Performed by: INTERNAL MEDICINE

## 2023-02-11 PROCEDURE — 6360000002 HC RX W HCPCS: Performed by: FAMILY MEDICINE

## 2023-02-11 PROCEDURE — 6360000002 HC RX W HCPCS: Performed by: NURSE PRACTITIONER

## 2023-02-11 PROCEDURE — 90935 HEMODIALYSIS ONE EVALUATION: CPT

## 2023-02-11 PROCEDURE — 99233 SBSQ HOSP IP/OBS HIGH 50: CPT | Performed by: INTERNAL MEDICINE

## 2023-02-11 PROCEDURE — 36415 COLL VENOUS BLD VENIPUNCTURE: CPT

## 2023-02-11 PROCEDURE — 94640 AIRWAY INHALATION TREATMENT: CPT

## 2023-02-11 PROCEDURE — 6370000000 HC RX 637 (ALT 250 FOR IP): Performed by: NURSE PRACTITIONER

## 2023-02-11 PROCEDURE — 2700000000 HC OXYGEN THERAPY PER DAY

## 2023-02-11 PROCEDURE — 83735 ASSAY OF MAGNESIUM: CPT

## 2023-02-11 PROCEDURE — 2060000000 HC ICU INTERMEDIATE R&B

## 2023-02-11 RX ORDER — HEPARIN SODIUM 5000 [USP'U]/ML
5000 INJECTION, SOLUTION INTRAVENOUS; SUBCUTANEOUS EVERY 8 HOURS SCHEDULED
Status: DISCONTINUED | OUTPATIENT
Start: 2023-02-11 | End: 2023-02-23

## 2023-02-11 RX ORDER — POTASSIUM CHLORIDE 20 MEQ/1
40 TABLET, EXTENDED RELEASE ORAL ONCE
Status: COMPLETED | OUTPATIENT
Start: 2023-02-11 | End: 2023-02-11

## 2023-02-11 RX ORDER — ALBUMIN (HUMAN) 12.5 G/50ML
25 SOLUTION INTRAVENOUS ONCE
Status: COMPLETED | OUTPATIENT
Start: 2023-02-11 | End: 2023-02-11

## 2023-02-11 RX ORDER — METOPROLOL SUCCINATE 25 MG/1
25 TABLET, EXTENDED RELEASE ORAL 2 TIMES DAILY
Status: DISCONTINUED | OUTPATIENT
Start: 2023-02-12 | End: 2023-02-11

## 2023-02-11 RX ORDER — METOPROLOL SUCCINATE 25 MG/1
25 TABLET, EXTENDED RELEASE ORAL DAILY
Status: DISCONTINUED | OUTPATIENT
Start: 2023-02-12 | End: 2023-02-24 | Stop reason: HOSPADM

## 2023-02-11 RX ADMIN — ROPINIROLE HYDROCHLORIDE 1 MG: 1 TABLET, FILM COATED ORAL at 08:07

## 2023-02-11 RX ADMIN — SUCRALFATE 1 G: 1 TABLET ORAL at 08:07

## 2023-02-11 RX ADMIN — SERTRALINE 50 MG: 50 TABLET, FILM COATED ORAL at 08:07

## 2023-02-11 RX ADMIN — MIRTAZAPINE 7.5 MG: 15 TABLET, FILM COATED ORAL at 23:08

## 2023-02-11 RX ADMIN — ARFORMOTEROL TARTRATE 15 MCG: 15 SOLUTION RESPIRATORY (INHALATION) at 17:31

## 2023-02-11 RX ADMIN — CARBIDOPA AND LEVODOPA 1 TABLET: 50; 200 TABLET, EXTENDED RELEASE ORAL at 23:08

## 2023-02-11 RX ADMIN — BUDESONIDE 500 MCG: 0.5 SUSPENSION RESPIRATORY (INHALATION) at 17:31

## 2023-02-11 RX ADMIN — SUCRALFATE 1 G: 1 TABLET ORAL at 23:07

## 2023-02-11 RX ADMIN — ANTI-FUNGAL POWDER MICONAZOLE NITRATE TALC FREE: 1.42 POWDER TOPICAL at 08:08

## 2023-02-11 RX ADMIN — CARBIDOPA AND LEVODOPA 1 TABLET: 50; 200 TABLET, EXTENDED RELEASE ORAL at 08:08

## 2023-02-11 RX ADMIN — ALBUMIN (HUMAN) 25 G: 0.25 INJECTION, SOLUTION INTRAVENOUS at 15:35

## 2023-02-11 RX ADMIN — DIGOXIN 125 MCG: 0.25 INJECTION INTRAMUSCULAR; INTRAVENOUS at 08:12

## 2023-02-11 RX ADMIN — PANTOPRAZOLE SODIUM 40 MG: 40 TABLET, DELAYED RELEASE ORAL at 06:27

## 2023-02-11 RX ADMIN — IPRATROPIUM BROMIDE AND ALBUTEROL SULFATE 3 ML: .5; 2.5 SOLUTION RESPIRATORY (INHALATION) at 04:54

## 2023-02-11 RX ADMIN — POTASSIUM CHLORIDE 40 MEQ: 1500 TABLET, EXTENDED RELEASE ORAL at 11:06

## 2023-02-11 RX ADMIN — MONTELUKAST SODIUM 10 MG: 10 TABLET, FILM COATED ORAL at 23:08

## 2023-02-11 RX ADMIN — ROPINIROLE HYDROCHLORIDE 1 MG: 1 TABLET, FILM COATED ORAL at 12:42

## 2023-02-11 RX ADMIN — ATORVASTATIN CALCIUM 20 MG: 20 TABLET, FILM COATED ORAL at 23:07

## 2023-02-11 RX ADMIN — ARFORMOTEROL TARTRATE 15 MCG: 15 SOLUTION RESPIRATORY (INHALATION) at 04:54

## 2023-02-11 RX ADMIN — ROPINIROLE HYDROCHLORIDE 1 MG: 1 TABLET, FILM COATED ORAL at 23:07

## 2023-02-11 RX ADMIN — IPRATROPIUM BROMIDE AND ALBUTEROL SULFATE 3 ML: .5; 2.5 SOLUTION RESPIRATORY (INHALATION) at 17:30

## 2023-02-11 RX ADMIN — SUCRALFATE 1 G: 1 TABLET ORAL at 12:42

## 2023-02-11 RX ADMIN — IPRATROPIUM BROMIDE AND ALBUTEROL SULFATE 3 ML: .5; 2.5 SOLUTION RESPIRATORY (INHALATION) at 13:28

## 2023-02-11 RX ADMIN — Medication 10 ML: at 08:14

## 2023-02-11 RX ADMIN — SUCRALFATE 1 G: 1 TABLET ORAL at 17:20

## 2023-02-11 RX ADMIN — CARBIDOPA AND LEVODOPA 1 TABLET: 50; 200 TABLET, EXTENDED RELEASE ORAL at 17:20

## 2023-02-11 RX ADMIN — ALBUMIN (HUMAN) 25 G: 0.25 INJECTION, SOLUTION INTRAVENOUS at 14:58

## 2023-02-11 RX ADMIN — BUDESONIDE 500 MCG: 0.5 SUSPENSION RESPIRATORY (INHALATION) at 04:54

## 2023-02-11 RX ADMIN — HEPARIN SODIUM 5000 UNITS: 5000 INJECTION INTRAVENOUS; SUBCUTANEOUS at 23:10

## 2023-02-11 RX ADMIN — IPRATROPIUM BROMIDE AND ALBUTEROL SULFATE 3 ML: .5; 2.5 SOLUTION RESPIRATORY (INHALATION) at 10:07

## 2023-02-11 ASSESSMENT — PAIN SCALES - GENERAL
PAINLEVEL_OUTOF10: 0
PAINLEVEL_OUTOF10: 0

## 2023-02-11 NOTE — FLOWSHEET NOTE
02/11/23 1655   Vital Signs   BP (!) 98/50   Temp 98 °F (36.7 °C)   Heart Rate 86   Resp 18   SpO2 95 %   Weight 229 lb 4.5 oz (104 kg)   Weight Method Bed scale   Percent Weight Change -1.23   Pain Assessment   Pain Assessment None - Denies Pain   Post-Hemodialysis Assessment   Post-Treatment Procedures Blood returned;Catheter capped, clamped with Citrate x 2 ports   Machine Disinfection Process Acid/Vinegar Clean;Exterior Machine Disinfection;Bleach;Verified Absence of Bleach in HCO3 Jug   Rinseback Volume (ml) 300 ml   Blood Volume Processed (Liters) 0 l/min   Dialyzer Clearance Moderately streaked   Duration of Treatment (minutes) 180 minutes   Hemodialysis Intake (ml) 300 ml   Hemodialysis Output (ml) 2300 ml   NET Removed (ml) 2000   Tolerated Treatment Good   Patient Response to Treatment Tolerated t well. Some low bps, albumin given for bp support.    Bilateral Breath Sounds Diminished   Edema Generalized

## 2023-02-11 NOTE — PROGRESS NOTES
Pulmonary/Critical Care Progress Note    We are following patient for acute hypoxemic and hypercapnic respiratory failure, morbid obesity, obstructive sleep apnea, diastolic dysfunction, COPD, diabetes mellitus type 2, Parkinson's disease, HFpEF      SUBJECTIVE:  Patient has been stable overnight. She underwent hemodialysis on February 10, 2023 and scheduled to have hemodialysis today. She is currently on 6 L nasal cannula. She has no fever, chills, rigors. MEDICATIONS:   digoxin  125 mcg IntraVENous Daily    metoprolol succinate  50 mg Oral BID    sucralfate  1 g Oral 4x Daily    rOPINIRole  1 mg Oral TID    atorvastatin  20 mg Oral Nightly    ipratropium-albuterol  1 vial Inhalation 4x Daily    [Held by provider] insulin glargine  13 Units SubCUTAneous BID    [Held by provider] LORazepam  0.5 mg Oral Nightly    insulin lispro  0-4 Units SubCUTAneous TID WC    insulin lispro  0-4 Units SubCUTAneous Nightly    budesonide  0.5 mg Nebulization BID    sodium chloride flush  10 mL IntraVENous 2 times per day    miconazole   Topical BID    arformoterol tartrate  15 mcg Nebulization BID    pantoprazole  40 mg Oral QAM AC    [Held by provider] apixaban  5 mg Oral BID    mirtazapine  7.5 mg Oral Nightly    carbidopa-levodopa  1 tablet Oral TID    sertraline  50 mg Oral Daily    montelukast  10 mg Oral Nightly      sodium chloride 25 mL/hr at 02/04/23 0222    dextrose       sodium chloride, loperamide, sodium chloride flush, sodium chloride, promethazine **OR** ondansetron, polyethylene glycol, acetaminophen **OR** acetaminophen, glucose, dextrose bolus **OR** dextrose bolus, glucagon (rDNA), dextrose      REVIEW OF SYSTEMS:  Constitutional: Denies fever, weight loss, night sweats, and fatigue  Skin: Denies pigmentation, dark lesions, and rashes   HEENT: Denies hearing loss, tinnitus, ear drainage, epistaxis, sore throat, and hoarseness. Cardiovascular: Denies palpitations, chest pain, and chest pressure.   Respiratory: Denies cough, dyspnea at rest, hemoptysis, apnea, and choking. Gastrointestinal: Denies nausea, vomiting, poor appetite, diarrhea, heartburn or reflux  Genitourinary: Denies dysuria, frequency, urgency or hematuria  Musculoskeletal: Denies myalgias, muscle weakness, and bone pain  Neurological: Denies dizziness, vertigo, headache, and focal weakness  Psychological: Denies anxiety and depression  Endocrine: Denies heat intolerance and cold intolerance  Hematopoietic/Lymphatic: Denies bleeding problems and blood transfusions    OBJECTIVE:  Vitals:    02/11/23 1008   BP:    Pulse:    Resp: 21   Temp:    SpO2:      FiO2 : 35 %  O2 Flow Rate (L/min): 6 L/min  O2 Device: Nasal cannula    PHYSICAL EXAM:  Constitutional: No fever, chills, diaphoresis  Skin: Venous stasis changes lower extremities  HEENT: Mucous membranes are dry  Neck: No JVD, lymphadenopathy, thyromegaly  Cardiovascular: S1, S2 irregular. No S3 or rubs present  Respiratory: Decreased breath sounds right lower lung field, normal breath sounds on left  Gastrointestinal: Soft, obese, nontender  Genitourinary: No grossly bloody urine  Extremities: No clubbing, cyanosis, or edema  Neurological: Awake, alert, oriented x3. No evidence of focal motor or sensory deficits  Psychological: Appropriate affect at times but not always.     LABS:  WBC   Date Value Ref Range Status   02/08/2023 4.5 4.5 - 11.5 E9/L Final   02/06/2023 6.1 4.5 - 11.5 E9/L Final   01/30/2023 7.2 4.5 - 11.5 E9/L Final     Hemoglobin   Date Value Ref Range Status   02/08/2023 8.3 (L) 11.5 - 15.5 g/dL Final   02/06/2023 7.8 (L) 11.5 - 15.5 g/dL Final   01/30/2023 7.7 (L) 11.5 - 15.5 g/dL Final     Hematocrit   Date Value Ref Range Status   02/08/2023 30.1 (L) 34.0 - 48.0 % Final   02/06/2023 27.5 (L) 34.0 - 48.0 % Final   01/30/2023 26.6 (L) 34.0 - 48.0 % Final     MCV   Date Value Ref Range Status   02/08/2023 89.3 80.0 - 99.9 fL Final   02/06/2023 88.1 80.0 - 99.9 fL Final   01/30/2023 89.3 80.0 - 99.9 fL Final     Platelets   Date Value Ref Range Status   02/08/2023 159 130 - 450 E9/L Final   02/06/2023 135 130 - 450 E9/L Final   01/30/2023 75 (L) 130 - 450 E9/L Final     Sodium   Date Value Ref Range Status   02/11/2023 142 132 - 146 mmol/L Final   02/10/2023 145 132 - 146 mmol/L Final   02/09/2023 149 (H) 132 - 146 mmol/L Final     Potassium   Date Value Ref Range Status   02/09/2023 3.2 (L) 3.5 - 5.0 mmol/L Final     Potassium reflex Magnesium   Date Value Ref Range Status   02/11/2023 3.4 (L) 3.5 - 5.0 mmol/L Final   02/10/2023 3.8 3.5 - 5.0 mmol/L Final   02/09/2023 3.2 (L) 3.5 - 5.0 mmol/L Final     Chloride   Date Value Ref Range Status   02/11/2023 100 98 - 107 mmol/L Final   02/10/2023 100 98 - 107 mmol/L Final   02/09/2023 98 98 - 107 mmol/L Final     CO2   Date Value Ref Range Status   02/11/2023 34 (H) 22 - 29 mmol/L Final   02/10/2023 37 (H) 22 - 29 mmol/L Final   02/09/2023 44 (HH) 22 - 29 mmol/L Final     BUN   Date Value Ref Range Status   02/11/2023 13 6 - 23 mg/dL Final   02/10/2023 23 6 - 23 mg/dL Final   02/09/2023 41 (H) 6 - 23 mg/dL Final     Creatinine   Date Value Ref Range Status   02/11/2023 1.5 (H) 0.5 - 1.0 mg/dL Final   02/10/2023 1.5 (H) 0.5 - 1.0 mg/dL Final   02/09/2023 2.2 (H) 0.5 - 1.0 mg/dL Final     Glucose   Date Value Ref Range Status   02/11/2023 103 (H) 74 - 99 mg/dL Final   02/10/2023 101 (H) 74 - 99 mg/dL Final   02/09/2023 112 (H) 74 - 99 mg/dL Final     Calcium   Date Value Ref Range Status   02/11/2023 7.9 (L) 8.6 - 10.2 mg/dL Final   02/10/2023 7.8 (L) 8.6 - 10.2 mg/dL Final   02/09/2023 8.2 (L) 8.6 - 10.2 mg/dL Final     Total Protein   Date Value Ref Range Status   02/09/2023 5.4 (L) 6.4 - 8.3 g/dL Final   02/06/2023 5.0 (L) 6.4 - 8.3 g/dL Final   02/05/2023 5.2 (L) 6.4 - 8.3 g/dL Final     Albumin   Date Value Ref Range Status   02/09/2023 3.4 (L) 3.5 - 5.2 g/dL Final   02/06/2023 2.9 (L) 3.5 - 5.2 g/dL Final   02/05/2023 3.0 (L) 3.5 - 5.2 g/dL Final Total Bilirubin   Date Value Ref Range Status   02/09/2023 0.6 0.0 - 1.2 mg/dL Final   02/06/2023 0.4 0.0 - 1.2 mg/dL Final   02/05/2023 0.3 0.0 - 1.2 mg/dL Final     Alkaline Phosphatase   Date Value Ref Range Status   02/09/2023 55 35 - 104 U/L Final   02/06/2023 60 35 - 104 U/L Final   02/05/2023 61 35 - 104 U/L Final     AST   Date Value Ref Range Status   02/09/2023 8 0 - 31 U/L Final   02/06/2023 7 0 - 31 U/L Final   02/05/2023 8 0 - 31 U/L Final     ALT   Date Value Ref Range Status   02/09/2023 <5 0 - 32 U/L Final   02/06/2023 <5 0 - 32 U/L Final   02/05/2023 <5 0 - 32 U/L Final     Est, Glom Filt Rate   Date Value Ref Range Status   02/11/2023 37 >=60 mL/min/1.73 Final     Comment:     Pediatric calculator link  [employer]. [email or web address]_[email or web address]  Effective Oct 3, 2022  These results are not intended for use in patients  <25years of age. eGFR results are calculated without  a race factor using the 2021 CKD-EPI equation. Careful  clinical correlation is recommended, particularly when  comparing to results calculated using previous equations. The CKD-EPI equation is less accurate in patients with  extremes of muscle mass, extra-renal metabolism of  creatinine, excessive creatinine ingestion, or following  therapy that affects renal tubular secretion. 02/10/2023 37 >=60 mL/min/1.73 Final     Comment:     Pediatric calculator link  [employer]. [email or web address]_[email or web address]  Effective Oct 3, 2022  These results are not intended for use in patients  <25years of age. eGFR results are calculated without  a race factor using the 2021 CKD-EPI equation. Careful  clinical correlation is recommended, particularly when  comparing to results calculated using previous equations.   The CKD-EPI equation is less accurate in patients with  extremes of muscle mass, extra-renal metabolism of  creatinine, excessive creatinine ingestion, or following  therapy that affects renal tubular secretion. 02/09/2023 23 >=60 mL/min/1.73 Final     Comment:     Pediatric calculator link  [employer]. [email or web address]/gfr_calculatorped  Effective Oct 3, 2022  These results are not intended for use in patients  <25years of age. eGFR results are calculated without  a race factor using the 2021 CKD-EPI equation. Careful  clinical correlation is recommended, particularly when  comparing to results calculated using previous equations. The CKD-EPI equation is less accurate in patients with  extremes of muscle mass, extra-renal metabolism of  creatinine, excessive creatinine ingestion, or following  therapy that affects renal tubular secretion. GFR    Date Value Ref Range Status   10/16/2022 >60  Final   10/14/2022 59  Final   10/13/2022 >60  Final     Magnesium   Date Value Ref Range Status   02/11/2023 1.7 1.6 - 2.6 mg/dL Final   02/09/2023 1.6 1.6 - 2.6 mg/dL Final   02/08/2023 1.6 1.6 - 2.6 mg/dL Final     Phosphorus   Date Value Ref Range Status   02/08/2023 4.1 2.5 - 4.5 mg/dL Final   02/07/2023 4.3 2.5 - 4.5 mg/dL Final   02/06/2023 3.6 2.5 - 4.5 mg/dL Final     No results for input(s): PH, PO2, PCO2, HCO3, BE, O2SAT in the last 72 hours. RADIOLOGY:  IR FLUORO GUIDED CVA DEVICE PLMT/REPLACE/REMOVAL   Final Result   Successful placement of a tunneled dialysis catheter via the right internal   jugular vein. Administration of conscious sedation. IR ULTRASOUND GUIDANCE VASCULAR ACCESS   Final Result   Successful placement of a tunneled dialysis catheter via the right internal   jugular vein. Administration of conscious sedation. XR CHEST PORTABLE   Final Result   Large right pleural effusion with likely adjacent compressive atelectasis. Suspected atelectasis at the posterior basal segment of the left lower lobe.          XR CHEST PORTABLE   Final Result   Lesser amount of aeration of the right lung         XR CHEST PORTABLE Final Result   1. Slight improved aeration of the right lung when compared to prior study   2. The left lung is grossly clear   3. Cardiomegaly         XR CHEST PORTABLE   Final Result   1. New opacification seen within the right lung base which could represent   partial collapse of the right lower lobe   2. Reaccumulating right pleural effusion   3. Right upper lobe airspace disease   4. There is no pneumothorax   5. The left lung is clear. XR CHEST 1 VIEW   Final Result   Improved ventilation of the right lung, status post thoracentesis with   possible tiny less than 5% right apical pneumothorax. .      Cavitary lesion in the right midlung field which may either represent a   necrotic malignancy versus pneumatosis. Consider CT scan. IR GUIDED THORACENTESIS PLEURAL   Final Result   Successful ultrasound guided thoracentesis. XR CHEST PORTABLE   Final Result   Complete opacification of the right hemithorax related to pleural effusion. Modest contralateral infiltrate and or atelectasis at the left lower chest.         CT ABDOMEN PELVIS WO CONTRAST Additional Contrast? None   Final Result   No nephroureterolithiasis or hydronephrosis. Splenomegaly. Small volume of perihepatic ascites. At least moderate-sized partially imaged right pleural effusion with   bibasilar atelectasis. Several locules of gas which appear to be within the endometrium at the level   of the uterine fundus, of uncertain etiology or clinical significance. Please correlate clinically. Diffuse anasarca throughout the soft tissues. Cardiomegaly. XR CHEST PORTABLE   Final Result   Stable right pleural effusion. PROBLEM LIST:  Principal Problem:    Acute on chronic respiratory failure with hypoxia and hypercapnia (HCC)  Active Problems:    Palliative care encounter  Resolved Problems:    * No resolved hospital problems.  *      IMPRESSION:  Right pleural effusion, possibly increasing  Severe diastolic dysfunction  Acute kidney injury about the same as yesterday  Cor pulmonale  Obstructive sleep apnea  Acute superimposed on chronic hypoxemic and hypercapnic respiratory failure  Hypokalemia    PLAN:  Potassium replacement per nephrology  Diuretics and hemodialysis per nephrology  Optimize volume status with hemodialysis  BiPAP while sleeping and napping  Wean FiO2 as able  Consider Pleurx catheter for right-sided pleural effusion as patient has a recent right-sided thoracentesis on February 1, 2023.     Electronically signed by Emely Mcbride MD on 2/11/2023 at 1:36 PM

## 2023-02-11 NOTE — PROGRESS NOTES
Department of Internal Medicine  General Internal Medicine  Attending Progress Note  Chief Complaint   Patient presents with    Respiratory Distress     Normally on 3L NC, came in on CPAP, given total of 50mcg push dose epi for low BP by EMS     SUBJECTIVE:    Reports that she is doing well. Denied fever and chills. No chest pain. No nausea or vomiting. No diarrhea. Aware of plans to continue current management and dialysis as directed by nephro.     OBJECTIVE      Medications    Current Facility-Administered Medications: digoxin (LANOXIN) injection 125 mcg, 125 mcg, IntraVENous, Daily  metoprolol succinate (TOPROL XL) extended release tablet 50 mg, 50 mg, Oral, BID  sodium chloride (OCEAN, BABY AYR) 0.65 % nasal spray 1 spray, 1 spray, Each Nostril, PRN  sucralfate (CARAFATE) tablet 1 g, 1 g, Oral, 4x Daily  rOPINIRole (REQUIP) tablet 1 mg, 1 mg, Oral, TID  atorvastatin (LIPITOR) tablet 20 mg, 20 mg, Oral, Nightly  ipratropium-albuterol (DUONEB) nebulizer solution 3 mL, 1 vial, Inhalation, 4x Daily  [Held by provider] insulin glargine (LANTUS) injection vial 13 Units, 13 Units, SubCUTAneous, BID  loperamide (IMODIUM) capsule 2 mg, 2 mg, Oral, 4x Daily PRN  [Held by provider] LORazepam (ATIVAN) tablet 0.5 mg, 0.5 mg, Oral, Nightly  insulin lispro (HUMALOG) injection vial 0-4 Units, 0-4 Units, SubCUTAneous, TID WC  insulin lispro (HUMALOG) injection vial 0-4 Units, 0-4 Units, SubCUTAneous, Nightly  budesonide (PULMICORT) nebulizer suspension 500 mcg, 0.5 mg, Nebulization, BID  sodium chloride flush 0.9 % injection 10 mL, 10 mL, IntraVENous, 2 times per day  sodium chloride flush 0.9 % injection 10 mL, 10 mL, IntraVENous, PRN  0.9 % sodium chloride infusion, , IntraVENous, PRN  promethazine (PHENERGAN) tablet 12.5 mg, 12.5 mg, Oral, Q6H PRN **OR** ondansetron (ZOFRAN) injection 4 mg, 4 mg, IntraVENous, Q6H PRN  polyethylene glycol (GLYCOLAX) packet 17 g, 17 g, Oral, Daily PRN  acetaminophen (TYLENOL) tablet 650 mg, 650 mg, Oral, Q6H PRN **OR** acetaminophen (TYLENOL) suppository 650 mg, 650 mg, Rectal, Q6H PRN  miconazole (MICOTIN) 2 % powder, , Topical, BID  arformoterol tartrate (BROVANA) nebulizer solution 15 mcg, 15 mcg, Nebulization, BID  pantoprazole (PROTONIX) tablet 40 mg, 40 mg, Oral, QAM AC  glucose chewable tablet 16 g, 4 tablet, Oral, PRN  dextrose bolus 10% 125 mL, 125 mL, IntraVENous, PRN **OR** dextrose bolus 10% 250 mL, 250 mL, IntraVENous, PRN  glucagon (rDNA) injection 1 mg, 1 mg, SubCUTAneous, PRN  dextrose 10 % infusion, , IntraVENous, Continuous PRN  [Held by provider] apixaban (ELIQUIS) tablet 5 mg, 5 mg, Oral, BID  mirtazapine (REMERON) tablet 7.5 mg, 7.5 mg, Oral, Nightly  carbidopa-levodopa (SINEMET CR)  MG per extended release tablet 1 tablet, 1 tablet, Oral, TID  sertraline (ZOLOFT) tablet 50 mg, 50 mg, Oral, Daily  montelukast (SINGULAIR) tablet 10 mg, 10 mg, Oral, Nightly  Physical    VITALS:  BP (!) 103/57   Pulse 85   Temp 97.5 °F (36.4 °C) (Oral)   Resp 21   Ht 5' (1.524 m)   Wt 232 lb 2.3 oz (105.3 kg)   SpO2 99%   BMI 45.34 kg/m²   CONSTITUTIONAL:  awake, cooperative, and no apparent distress  EYES:  extra-ocular muscles intact  ENT:  normocepalic, without obvious abnormality  NECK:  supple, symmetrical, trachea midline  LUNGS:  no increased work of breathing, no retractions, and clear to auscultation  CARDIOVASCULAR:  normal apical pulses and normal S1 and S2  ABDOMEN:  normal bowel sounds, non-distended, and non-tender  MUSCULOSKELETAL:  improving edema  NEUROLOGIC:  Mental Status Exam:  Level of Alertness:   awake  Orientation:   person, place, time  SKIN:  no bruising or bleeding  Data    CBC:   Lab Results   Component Value Date/Time    WBC 4.5 02/08/2023 04:49 AM    RBC 3.37 02/08/2023 04:49 AM    HGB 8.3 02/08/2023 04:49 AM    HCT 30.1 02/08/2023 04:49 AM    MCV 89.3 02/08/2023 04:49 AM    MCH 24.6 02/08/2023 04:49 AM    MCHC 27.6 02/08/2023 04:49 AM    RDW 17.3 02/08/2023 04:49 AM     02/08/2023 04:49 AM    MPV 11.5 02/08/2023 04:49 AM     BMP:    Lab Results   Component Value Date/Time     02/11/2023 07:55 AM    K 3.4 02/11/2023 07:55 AM     02/11/2023 07:55 AM    CO2 34 02/11/2023 07:55 AM    BUN 13 02/11/2023 07:55 AM    LABALBU 3.4 02/09/2023 10:12 AM    CREATININE 1.5 02/11/2023 07:55 AM    CALCIUM 7.9 02/11/2023 07:55 AM    GFRAA >60 10/16/2022 09:20 AM    LABGLOM 37 02/11/2023 07:55 AM    GLUCOSE 103 02/11/2023 07:55 AM       ASSESSMENT AND PLAN      Acute on chronic respiratory failure with hypoxia and hypercapnia: improving. Bipap PRN  Palliative care encounter  Acute on chronic heart failure with Diastolic dysfunction: continue diuretic as ordered. Now on dialysis which is helping with fluid management. Continue to monitor. Dm type 2 complicated with nephropathy: overall Bgl is well controlled. Continue current management  Hypertension: now hypotensive episodes. Current BP is tolerable. Continue BP support as needed  Anemia: due to CKD: continue to monitor  COPD: not in exacerbation: continue home meds  Dementia: stable. Continue Sinemet.

## 2023-02-11 NOTE — PROGRESS NOTES
INPATIENT CARDIOLOGY Follow UP    Name: Michelle Ray    Age: 67 y.o. Date of Admission: 1/17/2023  5:16 PM    Date of Service: 2/11/2023      Referring Physician: Edith Ashley MD      Subjective:  Undergoing hemodialysis today. Past Medical History:  Past Medical History:   Diagnosis Date    A-fib ([facility]     Acute on chronic congestive heart failure (HCC)     Anxiety     Asthma     CAD (coronary artery disease) 01/21/2016    Cancer ([facility]  breast ca 2006    right lumpectomy    Chronic kidney disease     nephrolithiasis    COPD exacerbation ([facility] 10/12/2022    Depression     Diabetes mellitus ([facility]     H/O mammogram     Hx MRSA infection     toe infection january 2012    Hyperlipidemia     Hypertension     Lateral epicondylitis     Morbid obesity (HCC)     SERENA on CPAP     Oxygen dependent     Parkinson's disease ([facility]     Tubal ligation status        Past Surgical History:  Past Surgical History:   Procedure Laterality Date    BREAST LUMPECTOMY      BREAST REDUCTION SURGERY      CARDIAC CATHETERIZATION  4/28/2014    Dr. Abdirashid Mtz  01/11/2022    Dr. Priscilla Ortiz  7/29/15    CT GUIDED CHEST TUBE  7/8/2022    CT GUIDED CHEST TUBE 7/8/2022 Brooklynn Viveros MD [facility]    ENDOSCOPY, COLON, DIAGNOSTIC  7/19/15    GALLBLADDER SURGERY      LUMBAR LAMINECTOMY      TOE AMPUTATION      TONSILLECTOMY      UPPER GASTROINTESTINAL ENDOSCOPY      UPPER GASTROINTESTINAL ENDOSCOPY N/A 7/19/2022    EGD ESOPHAGOGASTRODUODENOSCOPY performed by Callum Miranda MD at [facility] ENDOSCOPY       Family History:  Family History   Problem Relation Age of Onset    Cancer Mother         Lung Ca    Cancer Father         lung Ca    Diabetes Sister     Heart Disease Sister     Seizures Son     Other Brother         sepsis       Social History:  Social History     Socioeconomic History    Marital status:       Spouse name: Not on file    Number of children: Not on file    Years of education: Not on file    Highest education level: Not on file   Occupational History    Not on file   Tobacco Use    Smoking status: Never    Smokeless tobacco: Never   Vaping Use    Vaping Use: Never used   Substance and Sexual Activity    Alcohol use: No    Drug use: No    Sexual activity: Never   Other Topics Concern    Not on file   Social History Narrative    Not on file     Social Determinants of Health     Financial Resource Strain: Not on file   Food Insecurity: Not on file   Transportation Needs: Not on file   Physical Activity: Not on file   Stress: Not on file   Social Connections: Not on file   Intimate Partner Violence: Not on file   Housing Stability: Not on file       Allergies: Allergies   Allergen Reactions    Ciprofloxacin Nausea And Vomiting    Codeine Itching     Creepy crawly \" feelings    Digoxin And Related Other (See Comments)     History of Digoxin toxicity       Home Medications:  Prior to Admission medications    Medication Sig Start Date End Date Taking? Authorizing Provider   metoprolol succinate (TOPROL XL) 25 MG extended release tablet Take 25 mg by mouth in the morning and at bedtime   Yes Historical Provider, MD   BiPAP Machine MISC by Does not apply route Nightly.  12/6 30% PER NURSING HOME PAPERWORK    Historical Provider, MD   glucagon, rDNA, 1 MG injection Inject 1 mg into the muscle as needed for Low blood sugar    Historical Provider, MD   Glucose (TRUEPLUS) 15 GM/32ML GEL Take 15 g by mouth as needed    Historical Provider, MD   hydrOXYzine pamoate (VISTARIL) 25 MG capsule Take 25 mg by mouth 3 times daily as needed for Itching    Historical Provider, MD   acetaminophen (TYLENOL) 325 MG tablet Take 650 mg by mouth every 4 hours as needed for Pain    Historical Provider, MD   apixaban (ELIQUIS) 5 MG TABS tablet Take 1 tablet by mouth 2 times daily 12/24/22   Cecilia Chaney DO   bumetanide (BUMEX) 2 MG tablet Take 1 tablet by mouth daily 12/25/22   Cecilia Chaney DO midodrine (PROAMATINE) 5 MG tablet Take 1 tablet by mouth 3 times daily (with meals) 12/25/22   Cresencio Gomez DO   LORazepam (ATIVAN) 0.5 MG tablet Take 0.5 mg by mouth nightly.     Historical Provider, MD   insulin lispro (HUMALOG) 100 UNIT/ML injection vial Inject into the skin 3 times daily (before meals) Sliding scale  200-249=2 UNITS  250-299=4 UNITS  300-349=6 UNITS  350-399=8 UNITS  400-500=10 UNITS    Historical Provider, MD   sertraline (ZOLOFT) 50 MG tablet Take 50 mg by mouth daily    Historical Provider, MD   loperamide (IMODIUM) 2 MG capsule Take 2 mg by mouth 4 times daily as needed for Diarrhea    Historical Provider, MD   OXYGEN Inhale 3 L into the lungs continuous    Historical Provider, MD   docusate sodium (COLACE, DULCOLAX) 100 MG CAPS Take 100 mg by mouth 2 times daily as needed for Constipation 9/8/22   Umer Garcia MD   insulin glargine (LANTUS) 100 UNIT/ML injection vial Inject 13 Units into the skin 2 times daily 9/8/22   Umer Garcia MD   atorvastatin (LIPITOR) 20 MG tablet Take 20 mg by mouth nightly    Historical Provider, MD   benzoin compound solution Apply 1 Applicatorful topically nightly Apply topically to right toe  Patient not taking: No sig reported    Historical Provider, MD   ipratropium-albuterol (DUONEB) 0.5-2.5 (3) MG/3ML SOLN nebulizer solution Inhale 1 vial into the lungs 4 times daily    Historical Provider, MD   miconazole (MICOTIN) 2 % powder Apply 1 each topically 2 times daily Apply topically under breasts twice daily    Historical Provider, MD   pantoprazole (PROTONIX) 40 MG tablet Take 40 mg by mouth every morning    Historical Provider, MD   mirtazapine (REMERON) 15 MG tablet Take 7.5 mg by mouth nightly    Historical Provider, MD   budesonide-formoterol (SYMBICORT) 160-4.5 MCG/ACT AERO Inhale 2 puffs into the lungs 2 times daily    Historical Provider, MD   metoprolol tartrate (LOPRESSOR) 25 MG tablet Take 0.5 tablets by mouth in the morning and 0.5 tablets before bedtime. 7/27/22 9/8/22  Harry Beaver MD   carbidopa-levodopa (SINEMET CR)  MG per extended release tablet Take 2 tablets by mouth in the morning and 2 tablets at noon and 2 tablets in the evening. 7/23/22   Harry Beaver MD   rOPINIRole (REQUIP) 1 MG tablet Take 1 tablet by mouth in the morning and 1 tablet at noon and 1 tablet before bedtime. 7/23/22   Harry Beaver MD   amiodarone (CORDARONE) 200 MG tablet Take 1 tablet by mouth in the morning. 7/24/22 9/8/22  Harry Beaver MD   budesonide (PULMICORT) 0.5 MG/2ML nebulizer suspension Take 2 mLs by nebulization in the morning and 2 mLs in the evening. 7/23/22 9/8/22  Harry Beaver MD   insulin [facility] 100 UNIT/ML injection vial Inject 5 Units into the skin nightly 7/23/22 9/8/22  Harry Beaver MD   QUEtiapine (SEROQUEL) 25 MG tablet Take 1 tablet by mouth in the morning and 1 tablet before bedtime. 7/23/22 9/8/22  Harry Beaver MD   sucralfate (CARAFATE) 1 GM tablet Take 1 tablet by mouth in the morning and 1 tablet at noon and 1 tablet in the evening and 1 tablet before bedtime.  7/20/22   Leyla Gaines DO   divalproex (DEPAKOTE) 250 MG DR tablet Take 250 mg by mouth 2 times daily  7/23/22  Historical Provider, MD   ferrous sulfate (IRON 325) 325 (65 Fe) MG tablet Take 325 mg by mouth daily (with breakfast)  Patient not taking: Reported on 7/7/2022 7/23/22  Historical Provider, MD   aspirin EC 81 MG EC tablet Take 1 tablet by mouth daily 5/17/22   Perla Damon MD   montelukast (SINGULAIR) 10 MG tablet Take 10 mg by mouth nightly    Historical Provider, MD       Current Medications:  Current Facility-Administered Medications   Medication Dose Route Frequency Provider Last Rate Last Admin    heparin (porcine) injection 5,000 Units  5,000 Units SubCUTAneous 3 times per day Maia Perdue MD        digoxin (LANOXIN) injection 125 mcg  125 mcg IntraVENous Daily Etheleen Alpers, DO   125 mcg at 02/11/23 3287    metoprolol succinate (TOPROL XL) extended release tablet 50 mg  50 mg Oral BID [place] Pop[place], DO   50 mg at 02/08/23 2155    sodium chloride (OCEAN, BABY AYR) 0.65 % nasal spray 1 spray  1 spray Each Nostril PRN Alvaro Wu MD   1 spray at 02/08/23 1040    sucralfate (CARAFATE) tablet 1 g  1 g Oral 4x Daily Lee Mcclure MD   1 g at 02/11/23 1720    rOPINIRole (REQUIP) tablet 1 mg  1 mg Oral TID Lee Mcclure MD   1 mg at 02/11/23 1242    atorvastatin (LIPITOR) tablet 20 mg  20 mg Oral Nightly Lee Mcclure MD   20 mg at 02/10/23 2043    ipratropium-albuterol (DUONEB) nebulizer solution 3 mL  1 vial Inhalation 4x Daily Lee Mcclure MD   3 mL at 02/11/23 1730    [Held by provider] insulin glargine (LANTUS) injection vial 13 Units  13 Units SubCUTAneous BID Lee Mcclure MD   13 Units at 01/27/23 0913    loperamide (IMODIUM) capsule 2 mg  2 mg Oral 4x Daily PRN Lee Mccluer MD        [Held by provider] LORazepam (ATIVAN) tablet 0.5 mg  0.5 mg Oral Nightly Lee Mcclure MD   0.5 mg at 01/20/23 2317    insulin lispro (HUMALOG) injection vial 0-4 Units  0-4 Units SubCUTAneous TID WC Lee Mcclure MD   1 Units at 02/04/23 1244    insulin lispro (HUMALOG) injection vial 0-4 Units  0-4 Units SubCUTAneous Nightly Lee Mcclure MD   4 Units at 01/18/23 2158    budesonide (PULMICORT) nebulizer suspension 500 mcg  0.5 mg Nebulization BID Lee Mcclure MD   500 mcg at 02/11/23 1731    sodium chloride flush 0.9 % injection 10 mL  10 mL IntraVENous 2 times per day Lee Mcclure MD   10 mL at 02/11/23 0814    sodium chloride flush 0.9 % injection 10 mL  10 mL IntraVENous PRN Lee Mcclure MD        0.9 % sodium chloride infusion   IntraVENous PRN Lee Mcclure MD 25 mL/hr at 02/04/23 0222 New Bag at 02/04/23 0222    promethazine (PHENERGAN) tablet 12.5 mg  12.5 mg Oral Q6H PRN Lee Mcclure MD        Or    ondansetron (ZOFRAN) injection 4 mg  4 mg IntraVENous Q6H PRN Chioma Hawthorne MD        polyethylene glycol (GLYCOLAX) packet 17 g  17 g Oral Daily PRN Chioma Hawthorne MD        acetaminophen (TYLENOL) tablet 650 mg  650 mg Oral Q6H PRN Chioma Hawthorne MD   650 mg at 02/03/23 1014    Or    acetaminophen (TYLENOL) suppository 650 mg  650 mg Rectal Q6H PRN Chioma Hawthorne MD        miconazole (MICOTIN) 2 % powder   Topical BID Lenn Situ, APRN - CNP   Given at 02/11/23 0808    arformoterol tartrate (BROVANA) nebulizer solution 15 mcg  15 mcg Nebulization BID Lenn Situ, APRN - CNP   15 mcg at 02/11/23 1731    pantoprazole (PROTONIX) tablet 40 mg  40 mg Oral QAM AC Luke Porginski, APRN - CNP   40 mg at 02/11/23 2568    glucose chewable tablet 16 g  4 tablet Oral PRN Lenn Situ, APRN - CNP   16 g at 02/09/23 2313    dextrose bolus 10% 125 mL  125 mL IntraVENous PRN Lenn Situ, APRN - CNP        Or    dextrose bolus 10% 250 mL  250 mL IntraVENous PRN Lenn Situ, APRN - CNP        glucagon (rDNA) injection 1 mg  1 mg SubCUTAneous PRN Lenn Situ, APRN - CNP   1 mg at 02/09/23 2324    dextrose 10 % infusion   IntraVENous Continuous PRN Lenn Situ, APRN - CNP        [Held by provider] apixaban (ELIQUIS) tablet 5 mg  5 mg Oral BID Lenn Situ, APRN - CNP   5 mg at 02/07/23 0843    mirtazapine (REMERON) tablet 7.5 mg  7.5 mg Oral Nightly Lenn Situ, APRN - CNP   7.5 mg at 02/10/23 2045    carbidopa-levodopa (SINEMET CR)  MG per extended release tablet 1 tablet  1 tablet Oral TID Lenn Situ, APRN - CNP   1 tablet at 02/11/23 1720    sertraline (ZOLOFT) tablet 50 mg  50 mg Oral Daily Lenn Situ, APRN - CNP   50 mg at 02/11/23 0807    montelukast (SINGULAIR) tablet 10 mg  10 mg Oral Nightly Lenn Situ, APRN - CNP   10 mg at 02/10/23 2043      sodium chloride 25 mL/hr at 02/04/23 0222    dextrose         Physical Exam:  BP (!) 98/50   Pulse 86   Temp 98 °F (36.7 °C)   Resp 18   Ht 5' (1.524 m)   Wt 229 lb 4.5 oz (104 kg)   SpO2 95%   BMI 44.78 kg/m²   Wt Readings from Last 3 Encounters:   02/11/23 229 lb 4.5 oz (104 kg)   01/16/23 259 lb (117.5 kg)   01/16/23 259 lb (117.5 kg)       Appearance: Alert and oriented x3 not in acute distress. Skin: Dry skin  Head: Atraumatic  Eyes: Intact extraocular muscles   ENMT: Mucous membranes are moist  Neck: Supple  Lungs: Clear to auscultation  Cardiac: Normal S1 and S2  Abdomen: Protuberant  Extremities: Intact range of motion  Neurologic: No focal neurological deficits  Peripheral Pulses: 2+ peripheral pulses    Intake/Output:    Intake/Output Summary (Last 24 hours) at 2/11/2023 1732  Last data filed at 2/11/2023 1655  Gross per 24 hour   Intake 300 ml   Output 2300 ml   Net -2000 ml     I/O this shift:  In: 300   Out: 2300     Laboratory Tests:  Recent Labs     02/09/23  1012 02/10/23  0733 02/11/23  0755   * 145 142   K 3.2*  3.2* 3.8 3.4*   CL 98 100 100   CO2 44* 37* 34*   BUN 41* 23 13   CREATININE 2.2* 1.5* 1.5*   GLUCOSE 112* 101* 103*   CALCIUM 8.2* 7.8* 7.9*     Lab Results   Component Value Date/Time    MG 1.7 02/11/2023 07:55 AM    MG 1.6 02/09/2023 10:12 AM    MG 1.6 02/08/2023 04:49 AM     Recent Labs     02/09/23  1012   ALKPHOS 55   ALT <5   AST 8   PROT 5.4*   BILITOT 0.6   LABALBU 3.4*     No results for input(s): WBC, RBC, HGB, HCT, MCV, MCH, MCHC, RDW, PLT, MPV in the last 72 hours. Lab Results   Component Value Date    CKTOTAL 25 01/18/2023    CKMB 2.1 04/25/2014    TROPONINI <0.01 10/26/2020    TROPONINI <0.01 08/24/2019    TROPONINI <0.01 05/29/2019     No results for input(s): CKTOTAL, CKMB, CKMBINDEX, TROPHS in the last 72 hours.   Lab Results   Component Value Date    INR 1.6 02/09/2023    INR 2.1 02/03/2023    INR 1.6 02/02/2023    PROTIME 19.0 (H) 02/09/2023    PROTIME 23.7 (H) 02/03/2023    PROTIME 18.8 (H) 02/02/2023     Lab Results   Component Value Date    TSH 6.520 (H) 11/06/2022    TSH 8.350 (H) 09/27/2022    TSH 4.580 (H) 07/06/2022     Lab Results   Component Value Date    LABA1C 6.8 (H) 12/19/2022    LABA1C 6.1 (H) 08/24/2022    LABA1C 6.5 (H) 05/12/2022     No results found for: EAG  Lab Results   Component Value Date    CHOL 95 05/12/2022    CHOL 97 04/15/2022    CHOL 146 03/18/2022     Lab Results   Component Value Date    TRIG 93 05/12/2022    TRIG 171 (H) 04/15/2022    TRIG 134 03/18/2022     Lab Results   Component Value Date    HDL 38 05/12/2022    HDL 46 04/15/2022    HDL 50 03/18/2022     Lab Results   Component Value Date    LDLCALC 38 05/12/2022    LDLCALC 17 04/15/2022    LDLCALC 69 03/18/2022     Lab Results   Component Value Date    LABVLDL 19 05/12/2022    LABVLDL 34 04/15/2022    LABVLDL 27 03/18/2022    VLDL 84 09/19/2017     Lab Results   Component Value Date    CHOLHDLRATIO 3.3 03/13/2019    CHOLHDLRATIO 3.8 12/11/2018    CHOLHDLRATIO 3.0 09/04/2018     No results for input(s): PROBNP in the last 72 hours. Cardiac Tests:        TTE 7/7/22 Telly   Summary   Technically difficult study - limited visualization. Micro-bubble contrast injected to enhance left ventricular visualization. Normal left ventricular size and systolic function. Ejection fraction is visually estimated at 70-75%. Indeterminate diastolic function. No regional wall motion abnormalities seen. Mild left ventricular concentric hypertrophy noted. Moderately dilated right ventricle with reduced function. Biatrial dilation. Mild tricuspid regurgitation. RVSP is at least 60 mmHg. Prominent pericardial fat pad. No definitive evidence of pericardial effusion. Stress test:    Pharm stress 1/2022  Gated SPECT left ventricular ejection fraction was calculated to be   74% with normal wall motion and thickening. TID ratio 1.08.    1.  ECG during the infusion did not change.     2.  The myocardial perfusion imaging was abnormal.   3.  The abnormality was a large sized, moderate fixed defect involving   the inferior and inferolateral wall suggestive prior infarct without   any reversibility. There was also a small sized mild perfusion defect   involving the mid to distal anterior wall suggestive ischemia. 4.  Overall left ventricular systolic function was normal without   regional wall motion abnormalities. 5.  No transient ischemic dilatation. 6.  High risk general pharmacologic stress test.      Cardiac catheterization:   [facility] 1/11/22 Diane  CONCLUSIONS:  1. Coronary artery disease:  A. Left main. No significant disease. B.  LAD. Heavily calcified proximal and mid vessel with 50% mid vessel stenosis. 60% proximal stenosis of a moderate size first diagonal branch. C.  LCX. 50% ostial narrowing of the AV groove branch in the mid vessel which is a bifurcation lesion with a large marginal branch which itself did not appear to have any significant disease. The AV groove branch of  the left circumflex continues to provide a posterior descending artery branch and the posterolateral branch (codominant vessel). D.  RCA. Codominant vessel with no significant angiographic disease. 2.  Normal left ventricular size with hyperdynamic left ventricular systolic function with an estimated ejection fraction of 75%. 3.  Systemic hypertension. 4.  Elevated left ventricular end-diastolic pressure. Echo Summary 1/19/2023: No previous echo for comparison. Technically adequate study. Left ventricle size is normal.   Mild concentric left ventricular hypertrophy. Ejection fraction is visually estimated at 65%. No regional wall motion abnormalities seen. Indeterminate diastolic function. Right ventricle global systolic function is mildly reduced . Physiologic and/or trace mitral regurgitation is present. Moderate tricuspid regurgitation. RVSP is 47 mmHg. Pulmonary hypertension is mild to moderate . CXR reviewed:      The ASCVD Risk score (Jennifer DK, et al., 2019) failed to calculate for the following reasons: The patient has a prior MI or stroke diagnosis    ASSESSMENT / PLAN:    1. Acute on chronic diastolic CHF in the setting of significant acute on chronic kidney injury:  Volume management per nephrology  Continue beta-blocker and avoid nephrotoxic agents       2. CAD: Cath 1/11/2022 with moderate non-occlusive CAD treated medically. Continue statin/BB. Not on  ASA due to eliquis. 3. Permanent Afib:   Recurrent despite multiple cardioversion's. Continue beta-blocker and hold if hypotensive  Metoprolol dose is decreased from 50-25 given hypotension and reported bradycardia  Diltiazem was discontinued        4. Bradycardia issues:   Monitor closely on telemetry. Discontinue diltiazem  Metoprolol dose is decreased from 50 to 25 mg and the frequency changed from twice a day to daily with holding parameters    5. HTN:   Monitor closely and hold blood pressure meds if hypotensive     6. Lipids:   Continue on statin       7. DM:   Per primary service. 8. COPD/Asthma: On home oxygen. 9. Acute on CKD:   Nephrology following. Patient had hemodialysis catheter placed today    10. Anemia/Thrombocytopenia:  Monitor closely     11. SERENA     12. Parkinson's     13. Breast CA     14. Tobacco use         Cardiology will sign off. Please call with questions. Thank you for allowing me to participate in your patient's care. Please feel free to contact me if you have any questions or concerns.     Claus Addison MD  [facility]) Cardiology

## 2023-02-12 LAB
ANION GAP SERPL CALCULATED.3IONS-SCNC: 8 MMOL/L (ref 7–16)
BUN BLDV-MCNC: 16 MG/DL (ref 6–23)
CALCIUM SERPL-MCNC: 8.4 MG/DL (ref 8.6–10.2)
CHLORIDE BLD-SCNC: 101 MMOL/L (ref 98–107)
CO2: 33 MMOL/L (ref 22–29)
CREAT SERPL-MCNC: 1.9 MG/DL (ref 0.5–1)
GFR SERPL CREATININE-BSD FRML MDRD: 27 ML/MIN/1.73
GLUCOSE BLD-MCNC: 121 MG/DL (ref 74–99)
METER GLUCOSE: 132 MG/DL (ref 74–99)
METER GLUCOSE: 136 MG/DL (ref 74–99)
METER GLUCOSE: 149 MG/DL (ref 74–99)
METER GLUCOSE: 152 MG/DL (ref 74–99)
POTASSIUM REFLEX MAGNESIUM: 3.6 MMOL/L (ref 3.5–5)
SODIUM BLD-SCNC: 142 MMOL/L (ref 132–146)

## 2023-02-12 PROCEDURE — 6360000002 HC RX W HCPCS: Performed by: INTERNAL MEDICINE

## 2023-02-12 PROCEDURE — 2060000000 HC ICU INTERMEDIATE R&B

## 2023-02-12 PROCEDURE — 82962 GLUCOSE BLOOD TEST: CPT

## 2023-02-12 PROCEDURE — 6370000000 HC RX 637 (ALT 250 FOR IP): Performed by: NURSE PRACTITIONER

## 2023-02-12 PROCEDURE — 94660 CPAP INITIATION&MGMT: CPT

## 2023-02-12 PROCEDURE — 6370000000 HC RX 637 (ALT 250 FOR IP): Performed by: INTERNAL MEDICINE

## 2023-02-12 PROCEDURE — 99232 SBSQ HOSP IP/OBS MODERATE 35: CPT | Performed by: INTERNAL MEDICINE

## 2023-02-12 PROCEDURE — 94640 AIRWAY INHALATION TREATMENT: CPT

## 2023-02-12 PROCEDURE — 6360000002 HC RX W HCPCS: Performed by: FAMILY MEDICINE

## 2023-02-12 PROCEDURE — 2700000000 HC OXYGEN THERAPY PER DAY

## 2023-02-12 PROCEDURE — 6360000002 HC RX W HCPCS: Performed by: NURSE PRACTITIONER

## 2023-02-12 PROCEDURE — 2580000003 HC RX 258: Performed by: INTERNAL MEDICINE

## 2023-02-12 PROCEDURE — 2500000003 HC RX 250 WO HCPCS: Performed by: INTERNAL MEDICINE

## 2023-02-12 PROCEDURE — 80048 BASIC METABOLIC PNL TOTAL CA: CPT

## 2023-02-12 PROCEDURE — 36415 COLL VENOUS BLD VENIPUNCTURE: CPT

## 2023-02-12 RX ORDER — BUMETANIDE 0.25 MG/ML
2 INJECTION, SOLUTION INTRAMUSCULAR; INTRAVENOUS ONCE
Status: COMPLETED | OUTPATIENT
Start: 2023-02-12 | End: 2023-02-12

## 2023-02-12 RX ADMIN — CARBIDOPA AND LEVODOPA 1 TABLET: 50; 200 TABLET, EXTENDED RELEASE ORAL at 21:21

## 2023-02-12 RX ADMIN — Medication 10 ML: at 09:00

## 2023-02-12 RX ADMIN — MONTELUKAST SODIUM 10 MG: 10 TABLET, FILM COATED ORAL at 21:22

## 2023-02-12 RX ADMIN — BUDESONIDE 500 MCG: 0.5 SUSPENSION RESPIRATORY (INHALATION) at 17:58

## 2023-02-12 RX ADMIN — IPRATROPIUM BROMIDE AND ALBUTEROL SULFATE 3 ML: .5; 2.5 SOLUTION RESPIRATORY (INHALATION) at 10:13

## 2023-02-12 RX ADMIN — ATORVASTATIN CALCIUM 20 MG: 20 TABLET, FILM COATED ORAL at 21:21

## 2023-02-12 RX ADMIN — IPRATROPIUM BROMIDE AND ALBUTEROL SULFATE 3 ML: .5; 2.5 SOLUTION RESPIRATORY (INHALATION) at 05:10

## 2023-02-12 RX ADMIN — ROPINIROLE HYDROCHLORIDE 1 MG: 1 TABLET, FILM COATED ORAL at 21:21

## 2023-02-12 RX ADMIN — HEPARIN SODIUM 5000 UNITS: 5000 INJECTION INTRAVENOUS; SUBCUTANEOUS at 12:47

## 2023-02-12 RX ADMIN — IPRATROPIUM BROMIDE AND ALBUTEROL SULFATE 3 ML: .5; 2.5 SOLUTION RESPIRATORY (INHALATION) at 17:58

## 2023-02-12 RX ADMIN — SUCRALFATE 1 G: 1 TABLET ORAL at 16:31

## 2023-02-12 RX ADMIN — SUCRALFATE 1 G: 1 TABLET ORAL at 21:22

## 2023-02-12 RX ADMIN — SUCRALFATE 1 G: 1 TABLET ORAL at 12:46

## 2023-02-12 RX ADMIN — ROPINIROLE HYDROCHLORIDE 1 MG: 1 TABLET, FILM COATED ORAL at 12:47

## 2023-02-12 RX ADMIN — HEPARIN SODIUM 5000 UNITS: 5000 INJECTION INTRAVENOUS; SUBCUTANEOUS at 05:41

## 2023-02-12 RX ADMIN — ARFORMOTEROL TARTRATE 15 MCG: 15 SOLUTION RESPIRATORY (INHALATION) at 17:58

## 2023-02-12 RX ADMIN — BUMETANIDE 2 MG: 0.25 INJECTION, SOLUTION INTRAMUSCULAR; INTRAVENOUS at 16:31

## 2023-02-12 RX ADMIN — CARBIDOPA AND LEVODOPA 1 TABLET: 50; 200 TABLET, EXTENDED RELEASE ORAL at 15:07

## 2023-02-12 RX ADMIN — SERTRALINE 50 MG: 50 TABLET, FILM COATED ORAL at 09:18

## 2023-02-12 RX ADMIN — ANTI-FUNGAL POWDER MICONAZOLE NITRATE TALC FREE: 1.42 POWDER TOPICAL at 09:00

## 2023-02-12 RX ADMIN — HEPARIN SODIUM 5000 UNITS: 5000 INJECTION INTRAVENOUS; SUBCUTANEOUS at 21:20

## 2023-02-12 RX ADMIN — ARFORMOTEROL TARTRATE 15 MCG: 15 SOLUTION RESPIRATORY (INHALATION) at 05:10

## 2023-02-12 RX ADMIN — DIGOXIN 125 MCG: 0.25 INJECTION INTRAMUSCULAR; INTRAVENOUS at 09:19

## 2023-02-12 RX ADMIN — ROPINIROLE HYDROCHLORIDE 1 MG: 1 TABLET, FILM COATED ORAL at 09:19

## 2023-02-12 RX ADMIN — PANTOPRAZOLE SODIUM 40 MG: 40 TABLET, DELAYED RELEASE ORAL at 05:41

## 2023-02-12 RX ADMIN — CARBIDOPA AND LEVODOPA 1 TABLET: 50; 200 TABLET, EXTENDED RELEASE ORAL at 09:18

## 2023-02-12 RX ADMIN — BUDESONIDE 500 MCG: 0.5 SUSPENSION RESPIRATORY (INHALATION) at 05:10

## 2023-02-12 RX ADMIN — SUCRALFATE 1 G: 1 TABLET ORAL at 09:18

## 2023-02-12 RX ADMIN — IPRATROPIUM BROMIDE AND ALBUTEROL SULFATE 3 ML: .5; 2.5 SOLUTION RESPIRATORY (INHALATION) at 13:38

## 2023-02-12 RX ADMIN — MIRTAZAPINE 7.5 MG: 15 TABLET, FILM COATED ORAL at 21:22

## 2023-02-12 ASSESSMENT — PAIN SCALES - GENERAL
PAINLEVEL_OUTOF10: 0
PAINLEVEL_OUTOF10: 0

## 2023-02-12 NOTE — PROGRESS NOTES
Department of Internal Medicine  General Internal Medicine  Attending Progress Note  Chief Complaint   Patient presents with    Respiratory Distress     Normally on 3L NC, came in on CPAP, given total of 50mcg push dose epi for low BP by EMS     SUBJECTIVE:    Reports that she is doing well. Denied fever or chills. No chest pain. No nausea or vomiting.     OBJECTIVE      Medications    Current Facility-Administered Medications: heparin (porcine) injection 5,000 Units, 5,000 Units, SubCUTAneous, 3 times per day  metoprolol succinate (TOPROL XL) extended release tablet 25 mg, 25 mg, Oral, Daily  digoxin (LANOXIN) injection 125 mcg, 125 mcg, IntraVENous, Daily  sodium chloride (OCEAN, BABY AYR) 0.65 % nasal spray 1 spray, 1 spray, Each Nostril, PRN  sucralfate (CARAFATE) tablet 1 g, 1 g, Oral, 4x Daily  rOPINIRole (REQUIP) tablet 1 mg, 1 mg, Oral, TID  atorvastatin (LIPITOR) tablet 20 mg, 20 mg, Oral, Nightly  ipratropium-albuterol (DUONEB) nebulizer solution 3 mL, 1 vial, Inhalation, 4x Daily  [Held by provider] insulin glargine (LANTUS) injection vial 13 Units, 13 Units, SubCUTAneous, BID  loperamide (IMODIUM) capsule 2 mg, 2 mg, Oral, 4x Daily PRN  [Held by provider] LORazepam (ATIVAN) tablet 0.5 mg, 0.5 mg, Oral, Nightly  insulin lispro (HUMALOG) injection vial 0-4 Units, 0-4 Units, SubCUTAneous, TID WC  insulin lispro (HUMALOG) injection vial 0-4 Units, 0-4 Units, SubCUTAneous, Nightly  budesonide (PULMICORT) nebulizer suspension 500 mcg, 0.5 mg, Nebulization, BID  sodium chloride flush 0.9 % injection 10 mL, 10 mL, IntraVENous, 2 times per day  sodium chloride flush 0.9 % injection 10 mL, 10 mL, IntraVENous, PRN  0.9 % sodium chloride infusion, , IntraVENous, PRN  promethazine (PHENERGAN) tablet 12.5 mg, 12.5 mg, Oral, Q6H PRN **OR** ondansetron (ZOFRAN) injection 4 mg, 4 mg, IntraVENous, Q6H PRN  polyethylene glycol (GLYCOLAX) packet 17 g, 17 g, Oral, Daily PRN  acetaminophen (TYLENOL) tablet 650 mg, 650 mg, Oral, Q6H PRN **OR** acetaminophen (TYLENOL) suppository 650 mg, 650 mg, Rectal, Q6H PRN  miconazole (MICOTIN) 2 % powder, , Topical, BID  arformoterol tartrate (BROVANA) nebulizer solution 15 mcg, 15 mcg, Nebulization, BID  pantoprazole (PROTONIX) tablet 40 mg, 40 mg, Oral, QAM AC  glucose chewable tablet 16 g, 4 tablet, Oral, PRN  dextrose bolus 10% 125 mL, 125 mL, IntraVENous, PRN **OR** dextrose bolus 10% 250 mL, 250 mL, IntraVENous, PRN  glucagon (rDNA) injection 1 mg, 1 mg, SubCUTAneous, PRN  dextrose 10 % infusion, , IntraVENous, Continuous PRN  [Held by provider] apixaban (ELIQUIS) tablet 5 mg, 5 mg, Oral, BID  mirtazapine (REMERON) tablet 7.5 mg, 7.5 mg, Oral, Nightly  carbidopa-levodopa (SINEMET CR)  MG per extended release tablet 1 tablet, 1 tablet, Oral, TID  sertraline (ZOLOFT) tablet 50 mg, 50 mg, Oral, Daily  montelukast (SINGULAIR) tablet 10 mg, 10 mg, Oral, Nightly  Physical    VITALS:  BP (!) 108/52   Pulse 86   Temp 97.3 °F (36.3 °C) (Oral)   Resp 21   Ht 5' (1.524 m)   Wt 229 lb 4.5 oz (104 kg)   SpO2 91%   BMI 44.78 kg/m²   CONSTITUTIONAL:  awake, cooperative, and no apparent distress  EYES:  extra-ocular muscles intact  ENT:  normocepalic, without obvious abnormality  NECK:  supple, symmetrical, trachea midline  LUNGS:  no increased work of breathing, no retractions, and clear to auscultation  CARDIOVASCULAR:  normal apical pulses and normal S1 and S2  ABDOMEN:  normal bowel sounds, non-distended, and non-tender  MUSCULOSKELETAL:  full range of motion noted  NEUROLOGIC:  Mental Status Exam:  Level of Alertness:   awake  Orientation:   person, place, time  SKIN:  no bruising or bleeding  Data    CBC:   Lab Results   Component Value Date/Time    WBC 4.5 02/08/2023 04:49 AM    RBC 3.37 02/08/2023 04:49 AM    HGB 8.3 02/08/2023 04:49 AM    HCT 30.1 02/08/2023 04:49 AM    MCV 89.3 02/08/2023 04:49 AM    MCH 24.6 02/08/2023 04:49 AM    MCHC 27.6 02/08/2023 04:49 AM    RDW 17.3 02/08/2023 04:49 AM     02/08/2023 04:49 AM    MPV 11.5 02/08/2023 04:49 AM     BMP:    Lab Results   Component Value Date/Time     02/12/2023 07:01 AM    K 3.6 02/12/2023 07:01 AM     02/12/2023 07:01 AM    CO2 33 02/12/2023 07:01 AM    BUN 16 02/12/2023 07:01 AM    LABALBU 3.4 02/09/2023 10:12 AM    CREATININE 1.9 02/12/2023 07:01 AM    CALCIUM 8.4 02/12/2023 07:01 AM    GFRAA >60 10/16/2022 09:20 AM    LABGLOM 27 02/12/2023 07:01 AM    GLUCOSE 121 02/12/2023 07:01 AM       ASSESSMENT AND PLAN      Brief Summary of stay:    Patient was admitted with dyspnea and hypotension. She was found to be in fluid overload. She was diuresed with minimal improvement. She was treated with Bumex drip. Patient reluctantly agreed to dialysis. She is slowly improving with dialysis. However, pleural effusion still persistent. Acute on chronic respiratory failure with hypoxia and hypercapnia: breathing treatment as ordered and bipap  Palliative care encounter  DM type 2 complicated with nephropathy  CKD stage 3 now on dialysis  Hypotension  Anemia due to CKD: hgb is stable. EFleet Castellani UTI: completed Zyvox  Parkinson's disease: overall stable. Continue home meds  Sleep Apnea: continue bipap as ordered. Pleural Effusion: per Pulm.

## 2023-02-12 NOTE — PROGRESS NOTES
Pulmonary/Critical Care Progress Note    We are following patient for right pleural effusion increasing, severe diastolic dysfunction, CKD now on dialysis, cor pulmonale, obstructive sleep apnea on BiPAP      SUBJECTIVE:  The patient continues to reaccumulate right pleural fluid. Multiple thoracenteses have not and will not solve the problem because of her severe diastolic dysfunction. Therefore, she will require placement of a Pleurx catheter (has been off anticoagulants except for minidose heparin for 4 days with tomorrow being the fifth). Therefore, we will go ahead and request a Pleurx catheter be placed in interventional radiology so that her pleural fluid can be removed regularly. This will improve her heart and lung function and she should be much less dependent on BiPAP.     MEDICATIONS:   heparin (porcine)  5,000 Units SubCUTAneous 3 times per day    metoprolol succinate  25 mg Oral Daily    digoxin  125 mcg IntraVENous Daily    sucralfate  1 g Oral 4x Daily    rOPINIRole  1 mg Oral TID    atorvastatin  20 mg Oral Nightly    ipratropium-albuterol  1 vial Inhalation 4x Daily    [Held by provider] insulin glargine  13 Units SubCUTAneous BID    [Held by provider] LORazepam  0.5 mg Oral Nightly    insulin lispro  0-4 Units SubCUTAneous TID WC    insulin lispro  0-4 Units SubCUTAneous Nightly    budesonide  0.5 mg Nebulization BID    sodium chloride flush  10 mL IntraVENous 2 times per day    miconazole   Topical BID    arformoterol tartrate  15 mcg Nebulization BID    pantoprazole  40 mg Oral QAM AC    [Held by provider] apixaban  5 mg Oral BID    mirtazapine  7.5 mg Oral Nightly    carbidopa-levodopa  1 tablet Oral TID    sertraline  50 mg Oral Daily    montelukast  10 mg Oral Nightly      sodium chloride 25 mL/hr at 02/04/23 0222    dextrose       sodium chloride, loperamide, sodium chloride flush, sodium chloride, promethazine **OR** ondansetron, polyethylene glycol, acetaminophen **OR** acetaminophen, glucose, dextrose bolus **OR** dextrose bolus, glucagon (rDNA), dextrose      REVIEW OF SYSTEMS:  Patient is BiPAP dependent and cannot answer questions regarding review of systems  OBJECTIVE:  Vitals:    02/12/23 1128   BP: (!) 108/52   Pulse:    Resp:    Temp:    SpO2:      FiO2 : 45 %  O2 Flow Rate (L/min): (S) 7 L/min (found on 7 liters)  O2 Device: High flow nasal cannula    PHYSICAL EXAM:  Constitutional: No fever, chills, diaphoresis  Skin: No skin rash, no skin breakdown  HEENT: Unremarkable. Mucous membranes are moist  Neck: No JVD, lymphadenopathy, thyromegaly  Cardiovascular: S1, S2 regular. No S3 or rubs present  Respiratory: Markedly decreased breath sounds over almost entire right hemithorax  Gastrointestinal: Soft, obese, nontender  Genitourinary: No CVA tenderness  Extremities: No clubbing, cyanosis, or edema. Chronic venous stasis changes in ankles  Neurological: Awake, alert, oriented x3. Moves all extremities. Psychological: Affect is depressed.     LABS:  WBC   Date Value Ref Range Status   02/08/2023 4.5 4.5 - 11.5 E9/L Final   02/06/2023 6.1 4.5 - 11.5 E9/L Final   01/30/2023 7.2 4.5 - 11.5 E9/L Final     Hemoglobin   Date Value Ref Range Status   02/08/2023 8.3 (L) 11.5 - 15.5 g/dL Final   02/06/2023 7.8 (L) 11.5 - 15.5 g/dL Final   01/30/2023 7.7 (L) 11.5 - 15.5 g/dL Final     Hematocrit   Date Value Ref Range Status   02/08/2023 30.1 (L) 34.0 - 48.0 % Final   02/06/2023 27.5 (L) 34.0 - 48.0 % Final   01/30/2023 26.6 (L) 34.0 - 48.0 % Final     MCV   Date Value Ref Range Status   02/08/2023 89.3 80.0 - 99.9 fL Final   02/06/2023 88.1 80.0 - 99.9 fL Final   01/30/2023 89.3 80.0 - 99.9 fL Final     Platelets   Date Value Ref Range Status   02/08/2023 159 130 - 450 E9/L Final   02/06/2023 135 130 - 450 E9/L Final   01/30/2023 75 (L) 130 - 450 E9/L Final     Sodium   Date Value Ref Range Status   02/12/2023 142 132 - 146 mmol/L Final   02/11/2023 142 132 - 146 mmol/L Final   02/10/2023 145 132 - 146 mmol/L Final     Potassium reflex Magnesium   Date Value Ref Range Status   02/12/2023 3.6 3.5 - 5.0 mmol/L Final   02/11/2023 3.4 (L) 3.5 - 5.0 mmol/L Final   02/10/2023 3.8 3.5 - 5.0 mmol/L Final     Chloride   Date Value Ref Range Status   02/12/2023 101 98 - 107 mmol/L Final   02/11/2023 100 98 - 107 mmol/L Final   02/10/2023 100 98 - 107 mmol/L Final     CO2   Date Value Ref Range Status   02/12/2023 33 (H) 22 - 29 mmol/L Final   02/11/2023 34 (H) 22 - 29 mmol/L Final   02/10/2023 37 (H) 22 - 29 mmol/L Final     BUN   Date Value Ref Range Status   02/12/2023 16 6 - 23 mg/dL Final   02/11/2023 13 6 - 23 mg/dL Final   02/10/2023 23 6 - 23 mg/dL Final     Creatinine   Date Value Ref Range Status   02/12/2023 1.9 (H) 0.5 - 1.0 mg/dL Final   02/11/2023 1.5 (H) 0.5 - 1.0 mg/dL Final   02/10/2023 1.5 (H) 0.5 - 1.0 mg/dL Final     Glucose   Date Value Ref Range Status   02/12/2023 121 (H) 74 - 99 mg/dL Final   02/11/2023 103 (H) 74 - 99 mg/dL Final   02/10/2023 101 (H) 74 - 99 mg/dL Final     Calcium   Date Value Ref Range Status   02/12/2023 8.4 (L) 8.6 - 10.2 mg/dL Final   02/11/2023 7.9 (L) 8.6 - 10.2 mg/dL Final   02/10/2023 7.8 (L) 8.6 - 10.2 mg/dL Final     Total Protein   Date Value Ref Range Status   02/09/2023 5.4 (L) 6.4 - 8.3 g/dL Final   02/06/2023 5.0 (L) 6.4 - 8.3 g/dL Final   02/05/2023 5.2 (L) 6.4 - 8.3 g/dL Final     Albumin   Date Value Ref Range Status   02/09/2023 3.4 (L) 3.5 - 5.2 g/dL Final   02/06/2023 2.9 (L) 3.5 - 5.2 g/dL Final   02/05/2023 3.0 (L) 3.5 - 5.2 g/dL Final     Total Bilirubin   Date Value Ref Range Status   02/09/2023 0.6 0.0 - 1.2 mg/dL Final   02/06/2023 0.4 0.0 - 1.2 mg/dL Final   02/05/2023 0.3 0.0 - 1.2 mg/dL Final     Alkaline Phosphatase   Date Value Ref Range Status   02/09/2023 55 35 - 104 U/L Final   02/06/2023 60 35 - 104 U/L Final   02/05/2023 61 35 - 104 U/L Final     AST   Date Value Ref Range Status   02/09/2023 8 0 - 31 U/L Final   02/06/2023 7 0 - 31 U/L Final   02/05/2023 8 0 - 31 U/L Final     ALT   Date Value Ref Range Status   02/09/2023 <5 0 - 32 U/L Final   02/06/2023 <5 0 - 32 U/L Final   02/05/2023 <5 0 - 32 U/L Final     Est, Glom Filt Rate   Date Value Ref Range Status   02/12/2023 27 >=60 mL/min/1.73 Final     Comment:     Pediatric calculator link  [email or web address]_[email or web address]  Effective Oct 3, 2022  These results are not intended for use in patients  <25years of age. eGFR results are calculated without  a race factor using the 2021 CKD-EPI equation. Careful  clinical correlation is recommended, particularly when  comparing to results calculated using previous equations. The CKD-EPI equation is less accurate in patients with  extremes of muscle mass, extra-renal metabolism of  creatinine, excessive creatinine ingestion, or following  therapy that affects renal tubular secretion. 02/11/2023 37 >=60 mL/min/1.73 Final     Comment:     Pediatric calculator link  [email or web address]_[email or web address]  Effective Oct 3, 2022  These results are not intended for use in patients  <25years of age. eGFR results are calculated without  a race factor using the 2021 CKD-EPI equation. Careful  clinical correlation is recommended, particularly when  comparing to results calculated using previous equations. The CKD-EPI equation is less accurate in patients with  extremes of muscle mass, extra-renal metabolism of  creatinine, excessive creatinine ingestion, or following  therapy that affects renal tubular secretion. 02/10/2023 37 >=60 mL/min/1.73 Final     Comment:     Pediatric calculator link  [email or web address]. [email or web address]_[email or web address]  Effective Oct 3, 2022  These results are not intended for use in patients  <25years of age. eGFR results are calculated without  a race factor using the 2021 CKD-EPI equation.   Careful  clinical correlation is recommended, particularly when  comparing to results calculated using previous equations. The CKD-EPI equation is less accurate in patients with  extremes of muscle mass, extra-renal metabolism of  creatinine, excessive creatinine ingestion, or following  therapy that affects renal tubular secretion. GFR    Date Value Ref Range Status   10/16/2022 >60  Final   10/14/2022 59  Final   10/13/2022 >60  Final     Magnesium   Date Value Ref Range Status   02/11/2023 1.7 1.6 - 2.6 mg/dL Final   02/09/2023 1.6 1.6 - 2.6 mg/dL Final   02/08/2023 1.6 1.6 - 2.6 mg/dL Final     Phosphorus   Date Value Ref Range Status   02/08/2023 4.1 2.5 - 4.5 mg/dL Final   02/07/2023 4.3 2.5 - 4.5 mg/dL Final   02/06/2023 3.6 2.5 - 4.5 mg/dL Final     No results for input(s): PH, PO2, PCO2, HCO3, BE, O2SAT in the last 72 hours. RADIOLOGY:  IR FLUORO GUIDED CVA DEVICE PLMT/REPLACE/REMOVAL   Final Result   Successful placement of a tunneled dialysis catheter via the right internal   jugular vein. Administration of conscious sedation. IR ULTRASOUND GUIDANCE VASCULAR ACCESS   Final Result   Successful placement of a tunneled dialysis catheter via the right internal   jugular vein. Administration of conscious sedation. XR CHEST PORTABLE   Final Result   Large right pleural effusion with likely adjacent compressive atelectasis. Suspected atelectasis at the posterior basal segment of the left lower lobe. XR CHEST PORTABLE   Final Result   Lesser amount of aeration of the right lung         XR CHEST PORTABLE   Final Result   1. Slight improved aeration of the right lung when compared to prior study   2. The left lung is grossly clear   3. Cardiomegaly         XR CHEST PORTABLE   Final Result   1. New opacification seen within the right lung base which could represent   partial collapse of the right lower lobe   2. Reaccumulating right pleural effusion   3. Right upper lobe airspace disease   4.  There is no pneumothorax   5. The left lung is clear. XR CHEST 1 VIEW   Final Result   Improved ventilation of the right lung, status post thoracentesis with   possible tiny less than 5% right apical pneumothorax. .      Cavitary lesion in the right midlung field which may either represent a   necrotic malignancy versus pneumatosis. Consider CT scan. IR GUIDED THORACENTESIS PLEURAL   Final Result   Successful ultrasound guided thoracentesis. XR CHEST PORTABLE   Final Result   Complete opacification of the right hemithorax related to pleural effusion. Modest contralateral infiltrate and or atelectasis at the left lower chest.         CT ABDOMEN PELVIS WO CONTRAST Additional Contrast? None   Final Result   No nephroureterolithiasis or hydronephrosis. Splenomegaly. Small volume of perihepatic ascites. At least moderate-sized partially imaged right pleural effusion with   bibasilar atelectasis. Several locules of gas which appear to be within the endometrium at the level   of the uterine fundus, of uncertain etiology or clinical significance. Please correlate clinically. Diffuse anasarca throughout the soft tissues. Cardiomegaly. XR CHEST PORTABLE   Final Result   Stable right pleural effusion. PROBLEM LIST:  Principal Problem:    Acute on chronic respiratory failure with hypoxia and hypercapnia (HCC)  Active Problems:    Palliative care encounter  Resolved Problems:    * No resolved hospital problems. *        IMPRESSION:  Acute hypoxemic and hypercapnic respiratory failure  Severe obstructive sleep apnea  Cor pulmonale  Severe diastolic dysfunction  SHANTANU and CKD    PLAN:  Try to wean BiPAP but this may not be possible until after Pleurx catheter is placed  We will order the above for tomorrow; this is the only way to improve underlying heart and lung function and decrease dependence on noninvasive ventilation.     Electronically signed by Idalmis Saldana Antoine Brown MD on 2/12/2023 at 2:52 PM

## 2023-02-12 NOTE — PROGRESS NOTES
[facility] MD Samuel Guardado MD Octaviano Highland, MD Layman Anis, BRADLEY Devlin, NUNU Barajas CNP  Progress Note    2/11/2023    SUBJECTIVE:   1/20: Feeling little better today. Denies new complaint. Still tachypnea, though denies dyspnea no cp/palp Appetite ok ROS otherwise unremarkable    1//21 seen in her room on o2/nc bp stable weak poor po intake   2+ edema in LE     1/22 charts reviewed . On bipap    1/23 : on o2/nc . Still look hypervolemic     1/24 seen in her room comfortable o2/nc . Still with LE edema which seems to be multifactorial and will likely need to accept having some edema on board     1/25 sitting on edge of the bed working with pt . Still with considerable edema in LEs   BP stable     1/26 seen in her room reports of SOB with minimal activity , LE edema still signficant . 1/27 good UO On 5 L/o2/nc  Still with sob with activity Tachycardiac with low functional capacity     1/28: Asleep, resting and breathing comfortably on nasal cannula. Remains edematous. Ongoing fatigue and generalized weakness. 1/29: Hypotensive last evening, Bumex drip stopped, IV normal saline bolus administered to effect. Breathing little more easily though still on AVAPS. To be downgraded to CPAP shortly. Thirsty. Still markedly edematous. 1/30: Somnolent, though arousable. On CPAP. Ongoing fatigue and malaise with generalized weakness    1/31 seen in her room , skin wrinkling in LE on HFNC . BP on lower side  For thoracentesis today   Dw RN .     2/1: Seen while laying in bed, no acute distress. Tells me that she is thirsty. She is on heated high flow nasal canula. Feels that her breathing has improved slightly. Plan is for thoracentesis later this afternoon, could not be done yesterday due to elevated INR. Still with skin wrinkling to bilateral lower extremities.      2/2 1 L removed with thoracentesis   O2 requirement better on 8 L o2/nc Very weak and deconditioned   Labile BP numbers    2/2 o2/nc LE UO ok remain weak decondtioned . Edema in LE getting somewhat worse    2/4 remain with significant depdent edema deconditioned in bed     2/5: Discussed with respiratory therapist: Just put on her CPAP after having tolerated nasal cannula throughout most of the day. Remains bedbound, severely weak and debilitated. Remains massively swollen, little change in the last 5 days. 2/6: Status quo. Somnolent but arousable. Desats and dyspneic without AVAPS. Remains massively edematous    2/7:  resp status is status quo. More awake, alert, interactive today. Resumed bumex gtt yesterday. 2/8 for dialysis line in am   Alert oriented   Remain markedly hypervolemic     2/9: Sitting up in bed, on Bipap. Family is present at bedside. Markedly edematous. She is asking me if [facility] [place] placement will hurt. She reports dyspnea, but feels better on Bipap. She denies chest pain or palpitations. Plan is for [facility] [place] catheter placement with IR today. 2/10: Seen while on HD. Tolerating without complications. Tells me she is feeling much better. Still edematous, but edema has improved substantially. She is on nasal canula, eating breakfast. She had 2.7 L removed yesterday and 3L removed today. 2/11: Dialysis today notable for hypotension and only 2.1 L UF. Currently asleep, denies new complaint. Ongoing fatigue, malaise, generalized weakness. PROBLEM LIST:    Principal Problem:    Acute on chronic respiratory failure with hypoxia and hypercapnia (HCC)  Active Problems:    Palliative care encounter  Resolved Problems:    * No resolved hospital problems. *         DIET:    ADULT DIET; Regular; 3 carb choices (45 gm/meal); Low Fat/Low Chol/High Fiber/2 gm Na;  Moderately Thick (Honey); 1200 ml     MEDS (scheduled):    heparin (porcine)  5,000 Units SubCUTAneous 3 times per day    [START ON 2/12/2023] metoprolol succinate  25 mg Oral Daily    digoxin  125 mcg IntraVENous Daily    sucralfate  1 g Oral 4x Daily    rOPINIRole  1 mg Oral TID    atorvastatin  20 mg Oral Nightly    ipratropium-albuterol  1 vial Inhalation 4x Daily    [Held by provider] insulin glargine  13 Units SubCUTAneous BID    [Held by provider] LORazepam  0.5 mg Oral Nightly    insulin lispro  0-4 Units SubCUTAneous TID WC    insulin lispro  0-4 Units SubCUTAneous Nightly    budesonide  0.5 mg Nebulization BID    sodium chloride flush  10 mL IntraVENous 2 times per day    miconazole   Topical BID    arformoterol tartrate  15 mcg Nebulization BID    pantoprazole  40 mg Oral QAM AC    [Held by provider] apixaban  5 mg Oral BID    mirtazapine  7.5 mg Oral Nightly    carbidopa-levodopa  1 tablet Oral TID    sertraline  50 mg Oral Daily    montelukast  10 mg Oral Nightly       MEDS (infusions):   sodium chloride 25 mL/hr at 02/04/23 0222    dextrose         MEDS (prn):  sodium chloride, loperamide, sodium chloride flush, sodium chloride, promethazine **OR** ondansetron, polyethylene glycol, acetaminophen **OR** acetaminophen, glucose, dextrose bolus **OR** dextrose bolus, glucagon (rDNA), dextrose    PHYSICAL EXAM:     Patient Vitals for the past 24 hrs:   BP Temp Temp src Pulse Resp SpO2 Weight   02/11/23 1725 (!) 98/51 98 °F (36.7 °C) Oral 88 18 99 % --   02/11/23 1655 (!) 98/50 98 °F (36.7 °C) -- 86 18 95 % 229 lb 4.5 oz (104 kg)   02/11/23 1630 (!) 90/48 -- -- 85 -- -- --   02/11/23 1600 (!) 91/45 -- -- 89 -- -- --   02/11/23 1530 (!) 101/48 -- -- 81 -- -- --   02/11/23 1500 (!) 95/49 -- -- 84 -- -- --   02/11/23 1430 (!) 96/36 -- -- 88 -- -- --   02/11/23 1354 (!) 98/42 98 °F (36.7 °C) -- 87 20 98 % 232 lb 2.3 oz (105.3 kg)   02/11/23 1008 -- -- -- -- 21 -- --   02/11/23 0811 (!) 103/57 97.5 °F (36.4 °C) Oral 85 22 99 % --   02/11/23 0626 (!) 99/53 -- Oral 78 19 98 % --   02/11/23 0455 -- -- -- -- 21 -- --   02/11/23 0108 -- -- -- -- 22 -- --   02/10/23 2157 -- -- -- 77 -- -- --   02/10/23 2132 -- -- -- -- 20 -- --   @      Intake/Output Summary (Last 24 hours) at 2/11/2023 2041  Last data filed at 2/11/2023 1655  Gross per 24 hour   Intake 300 ml   Output 2300 ml   Net -2000 ml         Wt Readings from Last 3 Encounters:   02/11/23 229 lb 4.5 oz (104 kg)   01/16/23 259 lb (117.5 kg)   01/16/23 259 lb (117.5 kg)     Age-appropriate morbidly obese white woman, though easily arousable, no apparent distress  NC/AT EOMI sclera conjunctive are clear and anicteric mucous membranes are moist  Neck soft supple trachea midline  Lungs clear to ausculation bilaterally, diminished in the bases. Poor inspiratory effort. Regular rhythm normal S1 and S2  Abdomen soft nontender nondistended normal active bowel sounds. Abdominal pannus has substantial edema  Distal extremities, thighs, sacrum have substantial chronic type nonpitting edema, +1/trace BLE edema. Pulses 1+ x4  Moves all 4 extremities  Cranial nerves II through XII grossly intact  Awake, alert, interactive and appropriate  Skin tone consistent with chronic venous stasis distally      DATA:    No results for input(s): WBC, HGB, HCT, MCV, PLT in the last 72 hours. Recent Labs     02/09/23  1012 02/10/23  0733 02/11/23  0755   * 145 142   K 3.2*  3.2* 3.8 3.4*   CL 98 100 100   CO2 44* 37* 34*   MG 1.6  --  1.7   BUN 41* 23 13   CREATININE 2.2* 1.5* 1.5*   ALT <5  --   --    AST 8  --   --    BILITOT 0.6  --   --    ALKPHOS 55  --   --        Lab Results   Component Value Date    LABPROT 0.5 (H) 01/18/2023    LABPROT 0.5 01/18/2023     On CT no hydro/signs of obstruction     Urine studies  U Na 21, U Cl 23 U prot:cr ratio 0.5    ASSESSMENT / RECOMMENDATIONS:       Assessment  1. Acute kidney injury urine lytes support decrease effective volume     2. CKD stage III, Baseline creatinine 1.4 to 1.7 mg/dL, secondary to diabetic nephropathy and renal microvascular atherosclerotic disease. 0.5 g proteinuria     3.   Anemia due to CKD, phlebotomy associated prolonged hospitalization     4. Acute hypercapnic and hypoxic respiratory failure due to COPD exacerbation and pneumonia. Does not appear hypervolemic. 5.  Morbid obesity, BMI 50.6 kg per metered square     6. Edema/anasarca, chronic, multifactorial, due to venous insufficiency in the setting of morbid obesity, relative immobility, likely diastolic dysfunction though it was \"indeterminate\" on echo, the does have pulmonary hypertension, mild right-sided heart failure    7. Hypotension    8. Elevated bicarbonate represents compensation for chronic respiratory acidosis, as well as likely contraction alkalosis due to diuresis on the Bumex drip     Remains markedly hypervolemic, particular in her abdominal pannus extremities much relieved  Chest xray-large right pleural effusion   Tolerating hemodialysis without complications. Edema has improved substantially.      2.7 L removed 2/9  3 L removed 2/10  2.1 L removed today 2/11    Recommendations  Status post third RRT treatment today, isolated ultrafiltration for 2 L  Continue supportive care  Follow labs, UO  Next planned dialysis versus isolated ultrafiltration on Monday    Electronically signed by Una Matta MD on 2/11/2023

## 2023-02-12 NOTE — PROGRESS NOTES
[facility] MD Rocky Kennedy MD Willye Foley, MD Shelly Edelman, BRADLEY Devlin, NUNU Willis, BRADLEY  Progress Note    2/12/2023    SUBJECTIVE:   1/20: Feeling little better today. Denies new complaint. Still tachypnea, though denies dyspnea no cp/palp Appetite ok ROS otherwise unremarkable    1//21 seen in her room on o2/nc bp stable weak poor po intake   2+ edema in LE     1/22 charts reviewed . On bipap    1/23 : on o2/nc . Still look hypervolemic     1/24 seen in her room comfortable o2/nc . Still with LE edema which seems to be multifactorial and will likely need to accept having some edema on board     1/25 sitting on edge of the bed working with pt . Still with considerable edema in LEs   BP stable     1/26 seen in her room reports of SOB with minimal activity , LE edema still signficant . 1/27 good UO On 5 L/o2/nc  Still with sob with activity Tachycardiac with low functional capacity     1/28: Asleep, resting and breathing comfortably on nasal cannula. Remains edematous. Ongoing fatigue and generalized weakness. 1/29: Hypotensive last evening, Bumex drip stopped, IV normal saline bolus administered to effect. Breathing little more easily though still on AVAPS. To be downgraded to CPAP shortly. Thirsty. Still markedly edematous. 1/30: Somnolent, though arousable. On CPAP. Ongoing fatigue and malaise with generalized weakness    1/31 seen in her room , skin wrinkling in LE on HFNC . BP on lower side  For thoracentesis today   Dw RN .     2/1: Seen while laying in bed, no acute distress. Tells me that she is thirsty. She is on heated high flow nasal canula. Feels that her breathing has improved slightly. Plan is for thoracentesis later this afternoon, could not be done yesterday due to elevated INR. Still with skin wrinkling to bilateral lower extremities.      2/2 1 L removed with thoracentesis   O2 requirement better on 8 L o2/nc Very weak and deconditioned   Labile BP numbers    2/2 o2/nc LE UO ok remain weak decondtioned . Edema in LE getting somewhat worse    2/4 remain with significant depdent edema deconditioned in bed     2/5: Discussed with respiratory therapist: Just put on her CPAP after having tolerated nasal cannula throughout most of the day. Remains bedbound, severely weak and debilitated. Remains massively swollen, little change in the last 5 days. 2/6: Status quo. Somnolent but arousable. Desats and dyspneic without AVAPS. Remains massively edematous    2/7:  resp status is status quo. More awake, alert, interactive today. Resumed bumex gtt yesterday. 2/8 for dialysis line in am   Alert oriented   Remain markedly hypervolemic     2/9: Sitting up in bed, on Bipap. Family is present at bedside. Markedly edematous. She is asking me if [facility] placement will hurt. She reports dyspnea, but feels better on Bipap. She denies chest pain or palpitations. Plan is for [facility] catheter placement with IR today. 2/10: Seen while on HD. Tolerating without complications. Tells me she is feeling much better. Still edematous, but edema has improved substantially. She is on nasal canula, eating breakfast. She had 2.7 L removed yesterday and 3L removed today. 2/11: Dialysis today notable for hypotension and only 2.1 L UF. Currently asleep, denies new complaint. Ongoing fatigue, malaise, generalized weakness. 2/12: Somewhat less anxious than she had been over the past several days. Did tolerate roughly 3 hours off of CPAP consecutively yesterday. Still mostly using CPAP throughout the day. Swelling better. PROBLEM LIST:    Principal Problem:    Acute on chronic respiratory failure with hypoxia and hypercapnia (HCC)  Active Problems:    Palliative care encounter  Resolved Problems:    * No resolved hospital problems. *         DIET:    ADULT DIET; Regular; 3 carb choices (45 gm/meal);  Low Fat/Low Chol/High Fiber/2 gm Na; Moderately Thick (Honey); 1200 ml     MEDS (scheduled):    heparin (porcine)  5,000 Units SubCUTAneous 3 times per day    metoprolol succinate  25 mg Oral Daily    digoxin  125 mcg IntraVENous Daily    sucralfate  1 g Oral 4x Daily    rOPINIRole  1 mg Oral TID    atorvastatin  20 mg Oral Nightly    ipratropium-albuterol  1 vial Inhalation 4x Daily    [Held by provider] insulin glargine  13 Units SubCUTAneous BID    [Held by provider] LORazepam  0.5 mg Oral Nightly    insulin lispro  0-4 Units SubCUTAneous TID WC    insulin lispro  0-4 Units SubCUTAneous Nightly    budesonide  0.5 mg Nebulization BID    sodium chloride flush  10 mL IntraVENous 2 times per day    miconazole   Topical BID    arformoterol tartrate  15 mcg Nebulization BID    pantoprazole  40 mg Oral QAM AC    [Held by provider] apixaban  5 mg Oral BID    mirtazapine  7.5 mg Oral Nightly    carbidopa-levodopa  1 tablet Oral TID    sertraline  50 mg Oral Daily    montelukast  10 mg Oral Nightly       MEDS (infusions):   sodium chloride 25 mL/hr at 02/04/23 0222    dextrose         MEDS (prn):  sodium chloride, loperamide, sodium chloride flush, sodium chloride, promethazine **OR** ondansetron, polyethylene glycol, acetaminophen **OR** acetaminophen, glucose, dextrose bolus **OR** dextrose bolus, glucagon (rDNA), dextrose    PHYSICAL EXAM:     Patient Vitals for the past 24 hrs:   BP Temp Temp src Pulse Resp SpO2 Weight   02/12/23 1520 109/64 97.5 °F (36.4 °C) Oral 85 22 91 % --   02/12/23 1128 (!) 108/52 -- -- -- -- -- --   02/12/23 1014 -- -- -- -- 21 -- --   02/12/23 0918 (!) 93/49 97.3 °F (36.3 °C) Oral 86 17 91 % --   02/12/23 0513 -- -- -- -- 21 -- --   02/12/23 0415 (!) 129/55 -- -- 81 17 95 % --   02/12/23 0012 -- -- -- -- -- 97 % --   02/11/23 6549 -- -- -- -- 24 -- --   02/11/23 2259 128/74 97.7 °F (36.5 °C) Infrared 76 20 93 % --   02/11/23 2218 -- -- -- 70 -- -- --   02/11/23 1725 (!) 98/51 98 °F (36.7 °C) Oral 88 18 99 % --   02/11/23 1655 (!) 98/50 98 °F (36.7 °C) -- 86 18 95 % 229 lb 4.5 oz (104 kg)   02/11/23 1630 (!) 90/48 -- -- 85 -- -- --   02/11/23 1600 (!) 91/45 -- -- 89 -- -- --   02/11/23 1530 (!) 101/48 -- -- 81 -- -- --   @      Intake/Output Summary (Last 24 hours) at 2/12/2023 1522  Last data filed at 2/12/2023 0516  Gross per 24 hour   Intake 300 ml   Output 2600 ml   Net -2300 ml         Wt Readings from Last 3 Encounters:   02/11/23 229 lb 4.5 oz (104 kg)   01/16/23 259 lb (117.5 kg)   01/16/23 259 lb (117.5 kg)     Age-appropriate morbidly obese white woman, though easily arousable, no apparent distress  NC/AT EOMI sclera conjunctive are clear and anicteric mucous membranes are moist  Neck soft supple trachea midline  Lungs clear to ausculation bilaterally, diminished in the bases. Poor inspiratory effort. Regular rhythm normal S1 and S2  Abdomen soft nontender nondistended normal active bowel sounds. Abdominal pannus has substantial edema  Distal extremities, thighs, sacrum have substantial chronic type nonpitting edema, +1/trace BLE edema. Pulses 1+ x4  Moves all 4 extremities  Cranial nerves II through XII grossly intact  Awake, alert, interactive and appropriate  Skin tone consistent with chronic venous stasis distally      DATA:    No results for input(s): WBC, HGB, HCT, MCV, PLT in the last 72 hours. Recent Labs     02/10/23  0733 02/11/23  0755 02/12/23  0701    142 142   K 3.8 3.4* 3.6    100 101   CO2 37* 34* 33*   MG  --  1.7  --    BUN 23 13 16   CREATININE 1.5* 1.5* 1.9*       Lab Results   Component Value Date    LABPROT 0.5 (H) 01/18/2023    LABPROT 0.5 01/18/2023     On CT no hydro/signs of obstruction     Urine studies  U Na 21, U Cl 23 U prot:cr ratio 0.5    ASSESSMENT / RECOMMENDATIONS:       Assessment  1. Acute kidney injury urine lytes support decrease effective volume     2.   CKD stage III, Baseline creatinine 1.4 to 1.7 mg/dL, secondary to diabetic nephropathy and renal microvascular atherosclerotic disease. 0.5 g proteinuria     3. Anemia due to CKD, phlebotomy associated prolonged hospitalization     4. Acute hypercapnic and hypoxic respiratory failure due to COPD exacerbation and pneumonia. Does not appear hypervolemic. 5.  Morbid obesity, BMI 50.6 kg per metered square     6. Edema/anasarca, chronic, multifactorial, due to venous insufficiency in the setting of morbid obesity, relative immobility, likely diastolic dysfunction though it was \"indeterminate\" on echo, the does have pulmonary hypertension, mild right-sided heart failure    7. Hypotension    8. Elevated bicarbonate represents compensation for chronic respiratory acidosis, as well as likely contraction alkalosis due to diuresis on the Bumex drip     Remains markedly hypervolemic, particular in her abdominal pannus extremities much relieved  Chest xray-large right pleural effusion   Tolerating hemodialysis without complications. Edema has improved substantially.      2.7 L removed 2/9  3 L removed 2/10  2.1 L removed today 2/11    Recommendations  Plan isolated ultrafiltration treatment tomorrow, 3 hours 3 L as tolerates  Continue supportive care  Follow labs, UO      Electronically signed by Aurelia Maradiaga MD on 2/12/2023

## 2023-02-13 LAB
ALBUMIN SERPL-MCNC: 4.2 G/DL (ref 3.5–5.2)
ALP BLD-CCNC: 55 U/L (ref 35–104)
ALT SERPL-CCNC: <5 U/L (ref 0–32)
ANION GAP SERPL CALCULATED.3IONS-SCNC: 8 MMOL/L (ref 7–16)
AST SERPL-CCNC: 7 U/L (ref 0–31)
BILIRUB SERPL-MCNC: 0.7 MG/DL (ref 0–1.2)
BUN BLDV-MCNC: 20 MG/DL (ref 6–23)
CALCIUM SERPL-MCNC: 8.4 MG/DL (ref 8.6–10.2)
CHLORIDE BLD-SCNC: 99 MMOL/L (ref 98–107)
CO2: 33 MMOL/L (ref 22–29)
CREAT SERPL-MCNC: 2.4 MG/DL (ref 0.5–1)
GFR SERPL CREATININE-BSD FRML MDRD: 21 ML/MIN/1.73
GLUCOSE BLD-MCNC: 157 MG/DL (ref 74–99)
HCT VFR BLD CALC: 31.9 % (ref 34–48)
HEMOGLOBIN: 8.2 G/DL (ref 11.5–15.5)
INR BLD: 1.4
MAGNESIUM: 1.6 MG/DL (ref 1.6–2.6)
MCH RBC QN AUTO: 24.7 PG (ref 26–35)
MCHC RBC AUTO-ENTMCNC: 25.7 % (ref 32–34.5)
MCV RBC AUTO: 96.1 FL (ref 80–99.9)
METER GLUCOSE: 108 MG/DL (ref 74–99)
METER GLUCOSE: 143 MG/DL (ref 74–99)
METER GLUCOSE: 156 MG/DL (ref 74–99)
METER GLUCOSE: 162 MG/DL (ref 74–99)
PDW BLD-RTO: 19.2 FL (ref 11.5–15)
PHOSPHORUS: 2.7 MG/DL (ref 2.5–4.5)
PLATELET # BLD: 153 E9/L (ref 130–450)
PMV BLD AUTO: 12 FL (ref 7–12)
POTASSIUM SERPL-SCNC: 3.8 MMOL/L (ref 3.5–5)
PROTHROMBIN TIME: 16.1 SEC (ref 9.3–12.4)
RBC # BLD: 3.32 E12/L (ref 3.5–5.5)
SODIUM BLD-SCNC: 140 MMOL/L (ref 132–146)
TOTAL PROTEIN: 6.1 G/DL (ref 6.4–8.3)
WBC # BLD: 6.8 E9/L (ref 4.5–11.5)

## 2023-02-13 PROCEDURE — 6370000000 HC RX 637 (ALT 250 FOR IP): Performed by: INTERNAL MEDICINE

## 2023-02-13 PROCEDURE — 6360000002 HC RX W HCPCS: Performed by: INTERNAL MEDICINE

## 2023-02-13 PROCEDURE — 6370000000 HC RX 637 (ALT 250 FOR IP): Performed by: NURSE PRACTITIONER

## 2023-02-13 PROCEDURE — 2580000003 HC RX 258: Performed by: INTERNAL MEDICINE

## 2023-02-13 PROCEDURE — 94660 CPAP INITIATION&MGMT: CPT

## 2023-02-13 PROCEDURE — 94640 AIRWAY INHALATION TREATMENT: CPT

## 2023-02-13 PROCEDURE — 90935 HEMODIALYSIS ONE EVALUATION: CPT

## 2023-02-13 PROCEDURE — 2700000000 HC OXYGEN THERAPY PER DAY

## 2023-02-13 PROCEDURE — 80053 COMPREHEN METABOLIC PANEL: CPT

## 2023-02-13 PROCEDURE — 6360000002 HC RX W HCPCS: Performed by: NURSE PRACTITIONER

## 2023-02-13 PROCEDURE — 2060000000 HC ICU INTERMEDIATE R&B

## 2023-02-13 PROCEDURE — 99232 SBSQ HOSP IP/OBS MODERATE 35: CPT | Performed by: INTERNAL MEDICINE

## 2023-02-13 PROCEDURE — 83735 ASSAY OF MAGNESIUM: CPT

## 2023-02-13 PROCEDURE — 82962 GLUCOSE BLOOD TEST: CPT

## 2023-02-13 PROCEDURE — 85027 COMPLETE CBC AUTOMATED: CPT

## 2023-02-13 PROCEDURE — 6360000002 HC RX W HCPCS: Performed by: FAMILY MEDICINE

## 2023-02-13 PROCEDURE — 85610 PROTHROMBIN TIME: CPT

## 2023-02-13 PROCEDURE — 97530 THERAPEUTIC ACTIVITIES: CPT | Performed by: PHYSICAL THERAPIST

## 2023-02-13 PROCEDURE — 84100 ASSAY OF PHOSPHORUS: CPT

## 2023-02-13 PROCEDURE — 97110 THERAPEUTIC EXERCISES: CPT | Performed by: PHYSICAL THERAPIST

## 2023-02-13 PROCEDURE — 36415 COLL VENOUS BLD VENIPUNCTURE: CPT

## 2023-02-13 RX ADMIN — SERTRALINE 50 MG: 50 TABLET, FILM COATED ORAL at 08:39

## 2023-02-13 RX ADMIN — HEPARIN SODIUM 5000 UNITS: 5000 INJECTION INTRAVENOUS; SUBCUTANEOUS at 13:42

## 2023-02-13 RX ADMIN — ROPINIROLE HYDROCHLORIDE 1 MG: 1 TABLET, FILM COATED ORAL at 13:42

## 2023-02-13 RX ADMIN — BUDESONIDE 500 MCG: 0.5 SUSPENSION RESPIRATORY (INHALATION) at 04:48

## 2023-02-13 RX ADMIN — HEPARIN SODIUM 5000 UNITS: 5000 INJECTION INTRAVENOUS; SUBCUTANEOUS at 06:11

## 2023-02-13 RX ADMIN — CARBIDOPA AND LEVODOPA 1 TABLET: 50; 200 TABLET, EXTENDED RELEASE ORAL at 15:48

## 2023-02-13 RX ADMIN — ARFORMOTEROL TARTRATE 15 MCG: 15 SOLUTION RESPIRATORY (INHALATION) at 17:01

## 2023-02-13 RX ADMIN — ROPINIROLE HYDROCHLORIDE 1 MG: 1 TABLET, FILM COATED ORAL at 08:39

## 2023-02-13 RX ADMIN — MIRTAZAPINE 7.5 MG: 15 TABLET, FILM COATED ORAL at 20:44

## 2023-02-13 RX ADMIN — MONTELUKAST SODIUM 10 MG: 10 TABLET, FILM COATED ORAL at 20:44

## 2023-02-13 RX ADMIN — SUCRALFATE 1 G: 1 TABLET ORAL at 08:39

## 2023-02-13 RX ADMIN — IPRATROPIUM BROMIDE AND ALBUTEROL SULFATE 3 ML: .5; 2.5 SOLUTION RESPIRATORY (INHALATION) at 13:28

## 2023-02-13 RX ADMIN — CARBIDOPA AND LEVODOPA 1 TABLET: 50; 200 TABLET, EXTENDED RELEASE ORAL at 08:39

## 2023-02-13 RX ADMIN — PANTOPRAZOLE SODIUM 40 MG: 40 TABLET, DELAYED RELEASE ORAL at 06:11

## 2023-02-13 RX ADMIN — IPRATROPIUM BROMIDE AND ALBUTEROL SULFATE 3 ML: .5; 2.5 SOLUTION RESPIRATORY (INHALATION) at 04:48

## 2023-02-13 RX ADMIN — Medication 10 ML: at 08:41

## 2023-02-13 RX ADMIN — CARBIDOPA AND LEVODOPA 1 TABLET: 50; 200 TABLET, EXTENDED RELEASE ORAL at 20:44

## 2023-02-13 RX ADMIN — Medication 10 ML: at 20:44

## 2023-02-13 RX ADMIN — IPRATROPIUM BROMIDE AND ALBUTEROL SULFATE 3 ML: .5; 2.5 SOLUTION RESPIRATORY (INHALATION) at 17:00

## 2023-02-13 RX ADMIN — SUCRALFATE 1 G: 1 TABLET ORAL at 16:57

## 2023-02-13 RX ADMIN — SUCRALFATE 1 G: 1 TABLET ORAL at 13:42

## 2023-02-13 RX ADMIN — BUDESONIDE 500 MCG: 0.5 SUSPENSION RESPIRATORY (INHALATION) at 17:00

## 2023-02-13 RX ADMIN — ATORVASTATIN CALCIUM 20 MG: 20 TABLET, FILM COATED ORAL at 20:44

## 2023-02-13 RX ADMIN — ANTI-FUNGAL POWDER MICONAZOLE NITRATE TALC FREE: 1.42 POWDER TOPICAL at 20:46

## 2023-02-13 RX ADMIN — ARFORMOTEROL TARTRATE 15 MCG: 15 SOLUTION RESPIRATORY (INHALATION) at 04:48

## 2023-02-13 RX ADMIN — ANTI-FUNGAL POWDER MICONAZOLE NITRATE TALC FREE: 1.42 POWDER TOPICAL at 09:46

## 2023-02-13 RX ADMIN — ROPINIROLE HYDROCHLORIDE 1 MG: 1 TABLET, FILM COATED ORAL at 20:43

## 2023-02-13 RX ADMIN — DIGOXIN 125 MCG: 0.25 INJECTION INTRAMUSCULAR; INTRAVENOUS at 08:39

## 2023-02-13 RX ADMIN — SUCRALFATE 1 G: 1 TABLET ORAL at 20:44

## 2023-02-13 RX ADMIN — HEPARIN SODIUM 5000 UNITS: 5000 INJECTION INTRAVENOUS; SUBCUTANEOUS at 20:44

## 2023-02-13 NOTE — PROGRESS NOTES
Physical Therapy  Physical Therapy    Room #:   7866/0083-73    Date: 2023       Patient Name: Noé Lawson  : [id number]      MRN: [id number]     Patient unavailable for physical therapy treatment due to off floor at dialysis     Pantera Huynh PT

## 2023-02-13 NOTE — PROGRESS NOTES
[facility] MD Toby Jerome, MD Yumiko Sosa, BRADLEY Devlin, NUNU Matos CNP  Progress Note    2/13/2023    SUBJECTIVE:   1/20: Feeling little better today. Denies new complaint. Still tachypnea, though denies dyspnea no cp/palp Appetite ok ROS otherwise unremarkable    1//21 seen in her room on o2/nc bp stable weak poor po intake   2+ edema in LE     1/22 charts reviewed . On bipap    1/23 : on o2/nc . Still look hypervolemic     1/24 seen in her room comfortable o2/nc . Still with LE edema which seems to be multifactorial and will likely need to accept having some edema on board     1/25 sitting on edge of the bed working with pt . Still with considerable edema in LEs   BP stable     1/26 seen in her room reports of SOB with minimal activity , LE edema still signficant . 1/27 good UO On 5 L/o2/nc  Still with sob with activity Tachycardiac with low functional capacity     1/28: Asleep, resting and breathing comfortably on nasal cannula. Remains edematous. Ongoing fatigue and generalized weakness. 1/29: Hypotensive last evening, Bumex drip stopped, IV normal saline bolus administered to effect. Breathing little more easily though still on AVAPS. To be downgraded to CPAP shortly. Thirsty. Still markedly edematous. 1/30: Somnolent, though arousable. On CPAP. Ongoing fatigue and malaise with generalized weakness    1/31 seen in her room , skin wrinkling in LE on HFNC . BP on lower side  For thoracentesis today   Dw RN .     2/1: Seen while laying in bed, no acute distress. Tells me that she is thirsty. She is on heated high flow nasal canula. Feels that her breathing has improved slightly. Plan is for thoracentesis later this afternoon, could not be done yesterday due to elevated INR. Still with skin wrinkling to bilateral lower extremities.      2/2 1 L removed with thoracentesis   O2 requirement better on 8 L o2/nc Very weak and deconditioned   Labile BP numbers    2/2 o2/nc LE UO ok remain weak decondtioned . Edema in LE getting somewhat worse    2/4 remain with significant depdent edema deconditioned in bed     2/5: Discussed with respiratory therapist: Just put on her CPAP after having tolerated nasal cannula throughout most of the day. Remains bedbound, severely weak and debilitated. Remains massively swollen, little change in the last 5 days. 2/6: Status quo. Somnolent but arousable. Desats and dyspneic without AVAPS. Remains massively edematous    2/7:  resp status is status quo. More awake, alert, interactive today. Resumed bumex gtt yesterday. 2/8 for dialysis line in am   Alert oriented   Remain markedly hypervolemic     2/9: Sitting up in bed, on Bipap. Family is present at bedside. Markedly edematous. She is asking me if [facility] placement will hurt. She reports dyspnea, but feels better on Bipap. She denies chest pain or palpitations. Plan is for [facility] catheter placement with IR today. 2/10: Seen while on HD. Tolerating without complications. Tells me she is feeling much better. Still edematous, but edema has improved substantially. She is on nasal canula, eating breakfast. She had 2.7 L removed yesterday and 3L removed today. 2/11: Dialysis today notable for hypotension and only 2.1 L UF. Currently asleep, denies new complaint. Ongoing fatigue, malaise, generalized weakness. 2/12: Somewhat less anxious than she had been over the past several days. Did tolerate roughly 3 hours off of CPAP consecutively yesterday. Still mostly using CPAP throughout the day. Swelling better. 2/13: Seen while in dialysis. Tolerating ultrafiltration without complications. Plan is to remove 3L today. Blood pressure is stable. She is on nasal canula. She tells me that she goes back and forth between nasal canula and CPAP. Plan is for a right sided pleurix catheter to be placed later today.      PROBLEM LIST:    Principal Problem:    Acute on chronic respiratory failure with hypoxia and hypercapnia (HCC)  Active Problems:    Palliative care encounter  Resolved Problems:    * No resolved hospital problems. *         DIET:    ADULT DIET; Regular; 3 carb choices (45 gm/meal); Low Fat/Low Chol/High Fiber/2 gm Na;  Moderately Thick (Honey); 1200 ml     MEDS (scheduled):    heparin (porcine)  5,000 Units SubCUTAneous 3 times per day    metoprolol succinate  25 mg Oral Daily    digoxin  125 mcg IntraVENous Daily    sucralfate  1 g Oral 4x Daily    rOPINIRole  1 mg Oral TID    atorvastatin  20 mg Oral Nightly    ipratropium-albuterol  1 vial Inhalation 4x Daily    [Held by provider] insulin glargine  13 Units SubCUTAneous BID    [Held by provider] LORazepam  0.5 mg Oral Nightly    insulin lispro  0-4 Units SubCUTAneous TID WC    insulin lispro  0-4 Units SubCUTAneous Nightly    budesonide  0.5 mg Nebulization BID    sodium chloride flush  10 mL IntraVENous 2 times per day    miconazole   Topical BID    arformoterol tartrate  15 mcg Nebulization BID    pantoprazole  40 mg Oral QAM AC    [Held by provider] apixaban  5 mg Oral BID    mirtazapine  7.5 mg Oral Nightly    carbidopa-levodopa  1 tablet Oral TID    sertraline  50 mg Oral Daily    montelukast  10 mg Oral Nightly       MEDS (infusions):   sodium chloride 25 mL/hr at 02/04/23 0222    dextrose         MEDS (prn):  sodium chloride, loperamide, sodium chloride flush, sodium chloride, promethazine **OR** ondansetron, polyethylene glycol, acetaminophen **OR** acetaminophen, glucose, dextrose bolus **OR** dextrose bolus, glucagon (rDNA), dextrose    PHYSICAL EXAM:     Patient Vitals for the past 24 hrs:   BP Temp Temp src Pulse Resp SpO2 Weight   02/13/23 1100 (!) 110/59 -- -- 75 -- -- --   02/13/23 1030 (!) 115/50 -- -- 76 -- -- --   02/13/23 1000 (!) 108/53 -- -- 76 -- -- --   02/13/23 0930 (!) 102/57 -- -- 78 -- -- --   02/13/23 0915 120/65 97.3 °F (36.3 °C) -- 79 18 -- 232 lb 12.9 oz (105.6 kg)   02/13/23 0817 (!) 109/56 99 °F (37.2 °C) Oral 88 (!) 1 95 % --   02/13/23 0605 113/84 98.3 °F (36.8 °C) Infrared 85 17 91 % --   02/13/23 0448 -- -- -- -- 23 95 % --   02/13/23 0100 -- -- -- -- -- -- 249 lb 1.9 oz (113 kg)   02/12/23 2137 -- -- -- -- 21 -- --   02/12/23 2111 (!) 99/53 98.4 °F (36.9 °C) Infrared 88 16 93 % --   02/12/23 1758 -- -- -- -- -- 92 % --   02/12/23 1520 109/64 97.5 °F (36.4 °C) Oral 85 22 91 % --     @    No intake or output data in the 24 hours ending 02/13/23 1136        Wt Readings from Last 3 Encounters:   02/13/23 232 lb 12.9 oz (105.6 kg)   01/16/23 259 lb (117.5 kg)   01/16/23 259 lb (117.5 kg)     Age-appropriate morbidly obese white woman, though easily arousable, no apparent distress  NC/AT EOMI sclera conjunctive are clear and anicteric mucous membranes are moist  Neck soft supple trachea midline  Lungs clear to ausculation bilaterally, diminished in the bases. Poor inspiratory effort. Regular rhythm normal S1 and S2  Abdomen soft nontender nondistended normal active bowel sounds. Abdominal pannus has substantial edema  Distal extremities, thighs, sacrum have substantial chronic type nonpitting edema, +1/trace BLE edema. Pulses 1+ x4  Moves all 4 extremities  Cranial nerves II through XII grossly intact  Awake, alert, interactive and appropriate  Skin tone consistent with chronic venous stasis distally      DATA:    No results for input(s): WBC, HGB, HCT, MCV, PLT in the last 72 hours. Recent Labs     02/11/23  0755 02/12/23  0701    142   K 3.4* 3.6    101   CO2 34* 33*   MG 1.7  --    BUN 13 16   CREATININE 1.5* 1.9*         Lab Results   Component Value Date    LABPROT 0.5 (H) 01/18/2023    LABPROT 0.5 01/18/2023     On CT no hydro/signs of obstruction     Urine studies  U Na 21, U Cl 23 U prot:cr ratio 0.5    ASSESSMENT / RECOMMENDATIONS:       Assessment  1.   Acute kidney injury urine lytes support decrease effective volume 2.  CKD stage III, Baseline creatinine 1.4 to 1.7 mg/dL, secondary to diabetic nephropathy and renal microvascular atherosclerotic disease. 0.5 g proteinuria     3. Anemia due to CKD, phlebotomy associated prolonged hospitalization     4. Acute hypercapnic and hypoxic respiratory failure due to COPD exacerbation and pneumonia. Does not appear hypervolemic. 5.  Morbid obesity, BMI 50.6 kg per metered square     6. Edema/anasarca, chronic, multifactorial, due to venous insufficiency in the setting of morbid obesity, relative immobility, likely diastolic dysfunction though it was \"indeterminate\" on echo, the does have pulmonary hypertension, mild right-sided heart failure    7. Hypotension    8. Elevated bicarbonate represents compensation for chronic respiratory acidosis, as well as likely contraction alkalosis due to diuresis on the Bumex drip     Remains markedly hypervolemic, particular in her abdominal pannus extremities much relieved  Chest xray-large right pleural effusion   Tolerating hemodialysis without complications. Edema has improved substantially.      2.7 L removed 2/9  3 L removed 2/10  2.1 L removed today 2/11    Recommendations  Plan isolated ultrafiltration treatment today to remove 3L over 3 hours as tolerated   Plan on another ultrafiltration tomorrow   Continue supportive care  Follow labs, UO      Electronically signed by RASHADR Lewis CNP on 2/13/2023

## 2023-02-13 NOTE — PROGRESS NOTES
Physical Therapy  Physical Therapy Treatment Note/Plan of Care    Room #:  2198/2057-77  Patient Name: Brandin Coe  YOB: 1950  MRN: [id number]    Date of Service: 2/13/2023     Tentative placement recommendation: Subacute Rehab  Equipment recommendation:  To be determined      Evaluating Physical Therapist: Linda Hansen PT, DPT #[id number]      Specific Provider Orders/Date/Referring Provider :     01/19/23 0745    PT eval and treat  Start:  01/19/23 0745,   End:  01/19/23 0745,   ONE TIME,   Standing Count:  1 Occurrences,   Inna Garland MD Acknowledge New     Admitting Diagnosis:   NSTEMI (non-ST elevated myocardial infarction) [facility]) [I21.4]  Atrial fibrillation with rapid ventricular response (HCC) [I48.91]  Recurrent right pleural effusion [J90]  Hypotension, unspecified hypotension type [I95.9]  Acute on chronic respiratory failure with hypoxia and hypercapnia (HCC) [J96.21, J96.22]      Surgery: none  Visit Diagnoses         Codes    Recurrent right pleural effusion     J90    NSTEMI (non-ST elevated myocardial infarction) ([facility] [address]     I21.4    Postprocedural pneumothorax     J95.811            Patient Active Problem List   Diagnosis    Depression with anxiety    Osteoporosis    Asthma    Hyperlipidemia    Mitral regurgitation    Obstructive sleep apnea syndrome    Psoriasis    Diabetes mellitus ([facility]    Parkinson's disease ([address]    Primary hypertension    Microalbuminuria    Morbid obesity (HCC)    RLS (restless legs syndrome)    Generalized weakness    Inability to walk    Hypothyroidism    Chest pain    Acute asthma exacerbation    Asthma exacerbation, mild    Paroxysmal atrial fibrillation (HCC)    Atrial fibrillation with rapid ventricular response (HCC)    Acute on chronic congestive heart failure ([address]    Coronary artery disease involving native coronary artery of native heart without angina pectoris    Dysphagia    Hepatosplenomegaly    Acute decompensated heart failure ([facility]    Acute diastolic (congestive) heart failure (HCC)    Nonrheumatic tricuspid valve regurgitation    Tobacco abuse    Recurrent syncope    Septic shock (HCC)    Seizure-like activity (HCC)    Acute kidney injury ([facility]    Pancytopenia (HCC)    Thrombocytopenia (HCC)    Encephalopathy    Acute respiratory failure with hypoxia (HCC)    Lactic acidosis    Delirium    Acute on chronic anemia    Pleural effusion, right    Acute respiratory failure with hypoxia and hypercapnia (HCC)    Acute confusion    Acute on chronic diastolic heart failure (HCC)    Macrocytosis    Other specified anemias    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[facility]    Puerperal sepsis with acute hypoxic respiratory failure without septic shock (HCC)    Pulmonary HTN (HCC)    Chronic anticoagulation    RVF (right ventricular failure) (HCC)    Metabolic alkalosis    Sepsis (HCC)    COPD exacerbation (HCC)    Acute on chronic respiratory failure with hypercapnia (HCC)    Persistent atrial fibrillation (HCC)    Hypercapnic respiratory failure (HCC)    Acute on chronic respiratory failure (HCC)    Hyperkalemia    Heart failure (HCC)    Acute renal insufficiency    Hypotension    Anemia    Acute on chronic respiratory failure with hypoxia and hypercapnia (HCC)    Palliative care encounter        ASSESSMENT of Current Deficits Patient exhibits decreased strength, balance, and endurance impairing functional mobility, transfers, gait , gait distance, and tolerance to activity. Patient declined standing and bed mobility with max encouragement but was agreeable to supine exercises during session. Pt required AAROM for some supine exercises as well as MaxA x 2 to scoot toward HOB.        PHYSICAL THERAPY  PLAN OF CARE       Physical therapy plan of care is established based on physician order,  patient diagnosis and clinical assessment    Current Treatment Recommendations:    -Bed Mobility: Lower extremity exercises  and Trunk control activities   -Sitting Balance: Incorporate reaching activities to activate trunk muscles , Hands on support to maintain midline , Facilitate active trunk muscle engagement , Facilitate postural control in all planes , and Engage in core activities to allow for movement within base of support   -Standing Balance: Perform strengthening exercises in standing to promote motor control with or without upper extremity support , Instruct patient on adequate base of support to maintain balance, and Challenge balance utilizing reaching  activities beyond center of gravity    -Transfers: Provide instruction on proper hand and foot position for adequate transfer of weight onto lower extremities and use of gait device if needed, Cues for hand placement, technique and safety. Provide stabilization to prevent fall , Facilitate weight shift forward on to lower extremities and provide necessary stabilization of bilateral lower extremities , Support transfer of weight on to lower extremities, and Assist with extension of knees trunk and hip to accept weight transfer   -Gait: Gait training, Standing activities to improve: base of support, weight shift, weight bearing , Exercises to improve trunk control, Exercises to improve hip and knee control, Performance of protected weight bearing activities, and Activities to increase weight bearing   -Endurance: Utilize Supervised activities to increase level of endurance to allow for safe functional mobility including transfers and gait  and Use graduated activities to promote good breathing techniques and provide support and education to maximize respiratory function    PT long term treatment goals are located in below grid    Patient and or family understand(s) diagnosis, prognosis, and plan of care. Frequency of treatments: Patient will be seen  3-5 times/week.          Prior Level of Function: Patient ambulated with wheeled walker for short distances  Rehab Potential: fair + for baseline    Past medical history:   Past Medical History:   Diagnosis Date    A-fib ([facility]     Acute on chronic congestive heart failure (HCC)     Anxiety     Asthma     CAD (coronary artery disease) 01/21/2016    Cancer ([facility]  breast ca 2006    right lumpectomy    Chronic kidney disease     nephrolithiasis    COPD exacerbation ([facility] [address] 10/12/2022    Depression     Diabetes mellitus ([facility]     H/O mammogram     Hx MRSA infection     toe infection january 2012    Hyperlipidemia     Hypertension     Lateral epicondylitis     Morbid obesity (HCC)     SERENA on CPAP     Oxygen dependent     Parkinson's disease ([facility]     Tubal ligation status      Past Surgical History:   Procedure Laterality Date    BREAST LUMPECTOMY      BREAST REDUCTION SURGERY      CARDIAC CATHETERIZATION  4/28/2014    Dr. Jhon Issa  01/11/2022    Dr. Lieutenant Clayton  7/29/15    CT GUIDED CHEST TUBE  7/8/2022    CT GUIDED CHEST TUBE 7/8/2022 Maribel Garza MD [facility]    ENDOSCOPY, COLON, DIAGNOSTIC  7/19/15    GALLBLADDER SURGERY      LUMBAR LAMINECTOMY      TOE AMPUTATION      TONSILLECTOMY      UPPER GASTROINTESTINAL ENDOSCOPY      UPPER GASTROINTESTINAL ENDOSCOPY N/A 7/19/2022    EGD ESOPHAGOGASTRODUODENOSCOPY performed by Andrew Sotelo MD at [address]:    Precautions:  Up with assistance, falls, O2, and morbid obesity      Social history: Patient from SNF    Equipment owned: Wheelchair, Wheeled Walker, and [id number] - Inpatient   How much help is needed turning from your back to your side while in a flat bed without using bedrails?: A Lot  How much help is needed moving from lying on your back to sitting on the side of a flat bed without using bedrails?: A Lot  How much help is needed moving to and from a bed to a chair?: Total  How much help is needed standing up from a chair using your arms?: Total  How much help is needed walking in hospital room?: Total  How much help is needed climbing 3-5 steps with a railing?: Total  AM-PAC Inpatient Mobility Raw Score : 8  AM-PAC Inpatient T-Scale Score : 28.52  Mobility Inpatient CMS 0-100% Score: 86.62  Mobility Inpatient CMS G-Code Modifier : CM    Nursing cleared patient for PT treatment. Patient c/o being tired. OBJECTIVE;   Initial Evaluation  Date: 1/19/2023 Treatment Date:  2/13/2023       Short Term/ Long Term   Goals   Was pt agreeable to Eval/treatment? Yes yes To be met in 3 days   Pain level   0/10   0/10    Bed Mobility    Rolling: Maximal assist of 1    Supine to sit: Maximal assist of 1    Sit to supine: Maximal assist of 1    Scooting: Maximal assist of 1   Rolling: Moderate assist of 1   Supine to sit: Not assessed    Sit to supine: Not assessed    Scooting: Maximal assist of  2    Rolling: Minimal assist of 1    Supine to sit: Minimal assist of 1    Sit to supine: Minimal assist of 1    Scooting: Minimal assist of 1     Transfers Sit to stand: Not assessed  d/t fair- seated balance and pt declining standing Sit to stand: Not assessed , pt declined with encouragement      Sit to stand:  Moderate assist of 1    Ambulation    not assessed  not assessed    > 15 feet using  wheeled walker with Moderate assist of 1    Stair negotiation: ascended and descended   Not assessed           ROM Within functional limits    Increase range of motion 10% of affected joints    Strength BUE:  refer to OT eval  RLE:  4-/5  LLE:  4-/5  Increase strength in affected mm groups by 1/3 grade   Balance Sitting EOB:  fair -  Dynamic Standing:  not assessed  Sitting EOB: fair   Dynamic Standing: not assessed    Sitting EOB:  fair   Dynamic Standing: fair - with [address]     Patient is Alert & Oriented x person, place, time, and situation and follows directions    Sensation:  Patient  denies numbness/tingling   Edema:  no   Endurance: poor+    Vitals:  3 liters nasal cannula   Blood Pressure at rest  Blood Pressure during session    Heart Rate at rest  Heart Rate during session    SPO2 at rest %  SPO2 during session 88-95%     Patient education  Patient educated on role of Physical Therapy, risks of immobility, safety and plan of care, energy conservation,  importance of mobility while in hospital , ankle pumps, quad set and glut set for edema control, blood clot prevention, safety , and positioning for skin integrity and comfort     Patient response to education:   Pt verbalized understanding Pt demonstrated skill Pt requires further education in this area   Yes Partial Yes      Treatment:  Patient practiced and was instructed/facilitated in the following treatment: Patient performed supine BLE & BUE exercises. Therapeutic Exercises:  ankle pumps, heel raises, heel slide, hip abduction/adduction, straight leg raise, and SAQ  15 reps      At end of session, patient in bed with     call light and phone within reach,  all lines and tubes intact, nursing notified. Patient would benefit from continued skilled Physical Therapy to improve functional independence and quality of life.          Patient's/ family goals   rehab    Time in  1521  Time out  1547    Total Treatment Time  26 minutes      CPT codes:  Therapeutic activities (88122)   8 minutes  1 unit(s)  Therapeutic exercises (53918)   18 minutes  1 unit(s)    Ian Ramsey PT License Number [id number]

## 2023-02-13 NOTE — PROGRESS NOTES
Department of Internal Medicine  General Internal Medicine  Attending Progress Note  Chief Complaint   Patient presents with    Respiratory Distress     Normally on 3L NC, came in on CPAP, given total of 50mcg push dose epi for low BP by EMS     SUBJECTIVE:    No complaints    OBJECTIVE      Medications    Current Facility-Administered Medications: heparin (porcine) injection 5,000 Units, 5,000 Units, SubCUTAneous, 3 times per day  metoprolol succinate (TOPROL XL) extended release tablet 25 mg, 25 mg, Oral, Daily  digoxin (LANOXIN) injection 125 mcg, 125 mcg, IntraVENous, Daily  sodium chloride (OCEAN, BABY AYR) 0.65 % nasal spray 1 spray, 1 spray, Each Nostril, PRN  sucralfate (CARAFATE) tablet 1 g, 1 g, Oral, 4x Daily  rOPINIRole (REQUIP) tablet 1 mg, 1 mg, Oral, TID  atorvastatin (LIPITOR) tablet 20 mg, 20 mg, Oral, Nightly  ipratropium-albuterol (DUONEB) nebulizer solution 3 mL, 1 vial, Inhalation, 4x Daily  [Held by provider] insulin glargine (LANTUS) injection vial 13 Units, 13 Units, SubCUTAneous, BID  loperamide (IMODIUM) capsule 2 mg, 2 mg, Oral, 4x Daily PRN  [Held by provider] LORazepam (ATIVAN) tablet 0.5 mg, 0.5 mg, Oral, Nightly  insulin lispro (HUMALOG) injection vial 0-4 Units, 0-4 Units, SubCUTAneous, TID WC  insulin lispro (HUMALOG) injection vial 0-4 Units, 0-4 Units, SubCUTAneous, Nightly  budesonide (PULMICORT) nebulizer suspension 500 mcg, 0.5 mg, Nebulization, BID  sodium chloride flush 0.9 % injection 10 mL, 10 mL, IntraVENous, 2 times per day  sodium chloride flush 0.9 % injection 10 mL, 10 mL, IntraVENous, PRN  0.9 % sodium chloride infusion, , IntraVENous, PRN  promethazine (PHENERGAN) tablet 12.5 mg, 12.5 mg, Oral, Q6H PRN **OR** ondansetron (ZOFRAN) injection 4 mg, 4 mg, IntraVENous, Q6H PRN  polyethylene glycol (GLYCOLAX) packet 17 g, 17 g, Oral, Daily PRN  acetaminophen (TYLENOL) tablet 650 mg, 650 mg, Oral, Q6H PRN **OR** acetaminophen (TYLENOL) suppository 650 mg, 650 mg, Rectal, Q6H PRN  miconazole (MICOTIN) 2 % powder, , Topical, BID  arformoterol tartrate (BROVANA) nebulizer solution 15 mcg, 15 mcg, Nebulization, BID  pantoprazole (PROTONIX) tablet 40 mg, 40 mg, Oral, QAM AC  glucose chewable tablet 16 g, 4 tablet, Oral, PRN  dextrose bolus 10% 125 mL, 125 mL, IntraVENous, PRN **OR** dextrose bolus 10% 250 mL, 250 mL, IntraVENous, PRN  glucagon (rDNA) injection 1 mg, 1 mg, SubCUTAneous, PRN  dextrose 10 % infusion, , IntraVENous, Continuous PRN  [Held by provider] apixaban (ELIQUIS) tablet 5 mg, 5 mg, Oral, BID  mirtazapine (REMERON) tablet 7.5 mg, 7.5 mg, Oral, Nightly  carbidopa-levodopa (SINEMET CR)  MG per extended release tablet 1 tablet, 1 tablet, Oral, TID  sertraline (ZOLOFT) tablet 50 mg, 50 mg, Oral, Daily  montelukast (SINGULAIR) tablet 10 mg, 10 mg, Oral, Nightly  Physical    VITALS:  /60   Pulse 75   Temp 97.8 °F (36.6 °C) (Oral)   Resp 20   Ht 5' (1.524 m)   Wt 232 lb 12.9 oz (105.6 kg)   SpO2 92%   BMI 45.47 kg/m²   No acute distress moist membranes   Normocephalic, without obvious abnormality, atraumatic, external ears without lesions,   Neck supple no cervical lymphadenopathy  Heart has a regular rate and rhythm no murmur  Lungs are clear to auscultation bilaterally with equal movements. Anasarca  good peripheral perfusion. No significant rashes or new skin lesions. No new focality on neuro exam which is overall grossly intact.   Affect and mood seem to be appropriate     Data    CBC:   Lab Results   Component Value Date/Time    WBC 6.8 02/13/2023 02:27 PM    RBC 3.32 02/13/2023 02:27 PM    HGB 8.2 02/13/2023 02:27 PM    HCT 31.9 02/13/2023 02:27 PM    MCV 96.1 02/13/2023 02:27 PM    MCH 24.7 02/13/2023 02:27 PM    MCHC 25.7 02/13/2023 02:27 PM    RDW 19.2 02/13/2023 02:27 PM     02/13/2023 02:27 PM    MPV 12.0 02/13/2023 02:27 PM     BMP:    Lab Results   Component Value Date/Time     02/13/2023 02:27 PM    K 3.8 02/13/2023 02:27 PM    K 3.6 02/12/2023 07:01 AM    CL 99 02/13/2023 02:27 PM    CO2 33 02/13/2023 02:27 PM    BUN 20 02/13/2023 02:27 PM    LABALBU 4.2 02/13/2023 02:27 PM    CREATININE 2.4 02/13/2023 02:27 PM    CALCIUM 8.4 02/13/2023 02:27 PM    GFRAA >60 10/16/2022 09:20 AM    LABGLOM 21 02/13/2023 02:27 PM    GLUCOSE 157 02/13/2023 02:27 PM       ASSESSMENT AND PLAN    Patient was admitted with dyspnea and hypotension. She was found to be in fluid overload. She was diuresed with minimal improvement. She was treated with Bumex drip. Patient reluctantly agreed to dialysis. She is slowly improving with dialysis. However, pleural effusion still persistent. Acute on chronic respiratory failure with hypoxia and hypercapnia: breathing treatment as ordered and bipap  DM type 2 complicated with nephropathy  CKD stage 3 now on dialysis  Hypotension  Anemia due to CKD: hgb is stable. Suzan Ramon UTI: completed Zyvox  Parkinson's disease: overall stable. Continue home meds  Sleep Apnea: continue bipap as ordered. Pleural Effusion: per Pulm. DVT prophylaxis: Eliquis held for pleurex placement on 2/14  Full code.  Palliative care encounter  Disposition: will try ltach again

## 2023-02-14 ENCOUNTER — HOSPITAL ENCOUNTER (OUTPATIENT)
Dept: OTHER | Age: 73
Setting detail: THERAPIES SERIES
Discharge: HOME | End: 2023-02-14

## 2023-02-14 ENCOUNTER — APPOINTMENT (OUTPATIENT)
Dept: GENERAL RADIOLOGY | Age: 73
End: 2023-02-14
Payer: MEDICARE

## 2023-02-14 ENCOUNTER — APPOINTMENT (OUTPATIENT)
Dept: INTERVENTIONAL RADIOLOGY/VASCULAR | Age: 73
End: 2023-02-14
Payer: MEDICARE

## 2023-02-14 LAB
ALBUMIN SERPL-MCNC: 4 G/DL (ref 3.5–5.2)
ALP BLD-CCNC: 53 U/L (ref 35–104)
ALT SERPL-CCNC: <5 U/L (ref 0–32)
ANION GAP SERPL CALCULATED.3IONS-SCNC: 8 MMOL/L (ref 7–16)
AST SERPL-CCNC: 10 U/L (ref 0–31)
BILIRUB SERPL-MCNC: 0.6 MG/DL (ref 0–1.2)
BUN BLDV-MCNC: 20 MG/DL (ref 6–23)
CALCIUM SERPL-MCNC: 8.4 MG/DL (ref 8.6–10.2)
CHLORIDE BLD-SCNC: 99 MMOL/L (ref 98–107)
CO2: 32 MMOL/L (ref 22–29)
CREAT SERPL-MCNC: 2.7 MG/DL (ref 0.5–1)
GFR SERPL CREATININE-BSD FRML MDRD: 18 ML/MIN/1.73
GLUCOSE BLD-MCNC: 104 MG/DL (ref 74–99)
METER GLUCOSE: 109 MG/DL (ref 74–99)
METER GLUCOSE: 112 MG/DL (ref 74–99)
METER GLUCOSE: 127 MG/DL (ref 74–99)
METER GLUCOSE: 141 MG/DL (ref 74–99)
POTASSIUM SERPL-SCNC: 3.7 MMOL/L (ref 3.5–5)
SODIUM BLD-SCNC: 139 MMOL/L (ref 132–146)
TOTAL PROTEIN: 5.8 G/DL (ref 6.4–8.3)

## 2023-02-14 PROCEDURE — 94660 CPAP INITIATION&MGMT: CPT

## 2023-02-14 PROCEDURE — 0W9930Z DRAINAGE OF RIGHT PLEURAL CAVITY WITH DRAINAGE DEVICE, PERCUTANEOUS APPROACH: ICD-10-PCS | Performed by: RADIOLOGY

## 2023-02-14 PROCEDURE — 32550 INSERT PLEURAL CATH: CPT

## 2023-02-14 PROCEDURE — 6360000002 HC RX W HCPCS: Performed by: NURSE PRACTITIONER

## 2023-02-14 PROCEDURE — 99232 SBSQ HOSP IP/OBS MODERATE 35: CPT | Performed by: INTERNAL MEDICINE

## 2023-02-14 PROCEDURE — 6360000002 HC RX W HCPCS: Performed by: INTERNAL MEDICINE

## 2023-02-14 PROCEDURE — 2700000000 HC OXYGEN THERAPY PER DAY

## 2023-02-14 PROCEDURE — 94640 AIRWAY INHALATION TREATMENT: CPT

## 2023-02-14 PROCEDURE — 75989 ABSCESS DRAINAGE UNDER X-RAY: CPT

## 2023-02-14 PROCEDURE — 6370000000 HC RX 637 (ALT 250 FOR IP): Performed by: NURSE PRACTITIONER

## 2023-02-14 PROCEDURE — 80053 COMPREHEN METABOLIC PANEL: CPT

## 2023-02-14 PROCEDURE — 6370000000 HC RX 637 (ALT 250 FOR IP): Performed by: INTERNAL MEDICINE

## 2023-02-14 PROCEDURE — 2580000003 HC RX 258: Performed by: INTERNAL MEDICINE

## 2023-02-14 PROCEDURE — 90935 HEMODIALYSIS ONE EVALUATION: CPT

## 2023-02-14 PROCEDURE — 36415 COLL VENOUS BLD VENIPUNCTURE: CPT

## 2023-02-14 PROCEDURE — C1769 GUIDE WIRE: HCPCS

## 2023-02-14 PROCEDURE — 6360000002 HC RX W HCPCS: Performed by: FAMILY MEDICINE

## 2023-02-14 PROCEDURE — 82962 GLUCOSE BLOOD TEST: CPT

## 2023-02-14 PROCEDURE — 2060000000 HC ICU INTERMEDIATE R&B

## 2023-02-14 PROCEDURE — 71045 X-RAY EXAM CHEST 1 VIEW: CPT

## 2023-02-14 RX ORDER — MIDODRINE HYDROCHLORIDE 5 MG/1
10 TABLET ORAL
Status: DISCONTINUED | OUTPATIENT
Start: 2023-02-14 | End: 2023-02-24 | Stop reason: HOSPADM

## 2023-02-14 RX ORDER — MIDODRINE HYDROCHLORIDE 5 MG/1
5 TABLET ORAL ONCE
Status: COMPLETED | OUTPATIENT
Start: 2023-02-14 | End: 2023-02-14

## 2023-02-14 RX ADMIN — SUCRALFATE 1 G: 1 TABLET ORAL at 17:11

## 2023-02-14 RX ADMIN — Medication 10 ML: at 23:04

## 2023-02-14 RX ADMIN — CARBIDOPA AND LEVODOPA 1 TABLET: 50; 200 TABLET, EXTENDED RELEASE ORAL at 23:04

## 2023-02-14 RX ADMIN — ANTI-FUNGAL POWDER MICONAZOLE NITRATE TALC FREE: 1.42 POWDER TOPICAL at 22:29

## 2023-02-14 RX ADMIN — ARFORMOTEROL TARTRATE 15 MCG: 15 SOLUTION RESPIRATORY (INHALATION) at 18:06

## 2023-02-14 RX ADMIN — MIDODRINE HYDROCHLORIDE 10 MG: 5 TABLET ORAL at 22:26

## 2023-02-14 RX ADMIN — METOPROLOL SUCCINATE 25 MG: 25 TABLET, FILM COATED, EXTENDED RELEASE ORAL at 13:10

## 2023-02-14 RX ADMIN — ROPINIROLE HYDROCHLORIDE 1 MG: 1 TABLET, FILM COATED ORAL at 22:26

## 2023-02-14 RX ADMIN — ARFORMOTEROL TARTRATE 15 MCG: 15 SOLUTION RESPIRATORY (INHALATION) at 06:28

## 2023-02-14 RX ADMIN — HEPARIN SODIUM 5000 UNITS: 5000 INJECTION INTRAVENOUS; SUBCUTANEOUS at 22:29

## 2023-02-14 RX ADMIN — SUCRALFATE 1 G: 1 TABLET ORAL at 13:09

## 2023-02-14 RX ADMIN — ATORVASTATIN CALCIUM 20 MG: 20 TABLET, FILM COATED ORAL at 22:25

## 2023-02-14 RX ADMIN — BUDESONIDE 500 MCG: 0.5 SUSPENSION RESPIRATORY (INHALATION) at 06:29

## 2023-02-14 RX ADMIN — IPRATROPIUM BROMIDE AND ALBUTEROL SULFATE 3 ML: .5; 2.5 SOLUTION RESPIRATORY (INHALATION) at 18:06

## 2023-02-14 RX ADMIN — SERTRALINE 50 MG: 50 TABLET, FILM COATED ORAL at 13:10

## 2023-02-14 RX ADMIN — IPRATROPIUM BROMIDE AND ALBUTEROL SULFATE 3 ML: .5; 2.5 SOLUTION RESPIRATORY (INHALATION) at 12:47

## 2023-02-14 RX ADMIN — BUDESONIDE 500 MCG: 0.5 SUSPENSION RESPIRATORY (INHALATION) at 18:06

## 2023-02-14 RX ADMIN — MIDODRINE HYDROCHLORIDE 5 MG: 5 TABLET ORAL at 17:11

## 2023-02-14 RX ADMIN — IPRATROPIUM BROMIDE AND ALBUTEROL SULFATE 3 ML: .5; 2.5 SOLUTION RESPIRATORY (INHALATION) at 06:28

## 2023-02-14 RX ADMIN — DIGOXIN 125 MCG: 0.25 INJECTION INTRAMUSCULAR; INTRAVENOUS at 13:10

## 2023-02-14 RX ADMIN — ROPINIROLE HYDROCHLORIDE 1 MG: 1 TABLET, FILM COATED ORAL at 13:10

## 2023-02-14 RX ADMIN — HEPARIN SODIUM 5000 UNITS: 5000 INJECTION INTRAVENOUS; SUBCUTANEOUS at 13:10

## 2023-02-14 RX ADMIN — PANTOPRAZOLE SODIUM 40 MG: 40 TABLET, DELAYED RELEASE ORAL at 06:09

## 2023-02-14 RX ADMIN — MIRTAZAPINE 7.5 MG: 15 TABLET, FILM COATED ORAL at 22:25

## 2023-02-14 RX ADMIN — MONTELUKAST SODIUM 10 MG: 10 TABLET, FILM COATED ORAL at 22:25

## 2023-02-14 RX ADMIN — ANTI-FUNGAL POWDER MICONAZOLE NITRATE TALC FREE: 1.42 POWDER TOPICAL at 13:16

## 2023-02-14 RX ADMIN — SUCRALFATE 1 G: 1 TABLET ORAL at 23:04

## 2023-02-14 ASSESSMENT — PAIN SCALES - GENERAL
PAINLEVEL_OUTOF10: 0
PAINLEVEL_OUTOF10: 0

## 2023-02-14 ASSESSMENT — PAIN SCALES - WONG BAKER: WONGBAKER_NUMERICALRESPONSE: 0

## 2023-02-14 NOTE — PROGRESS NOTES
Pt. Came to specials for right pleural space  pleurx catheter placement. Dr. Tony Greene explained procedure and answered any questions. Patient was educated about the amount of radiation used with today's procedure. Consent signed. Patient positioned. Emotional support given. All monitors and safety equipment secured. Prep begun    1308 procedure start VS 95/41 70 20 95% on 8LPM/ Nasal cannula     Pleurx catheter secured with absorbable sutures. 1522 procedure completed VS 97/45 64 20 95% on 8LPM/nasal cannula    1000cc of hazy yellow colored pleural fluid drained. DSD applied to right lower back. CDI. Vss. Pt. tolerated well. Post chest xray taken    Nurse to nurse called to floor spoke with Ayanna Harris, nurse notified of above information. Patient transferred back to the Fifth floor.

## 2023-02-14 NOTE — PROGRESS NOTES
Department of Internal Medicine  General Internal Medicine  Attending Progress Note  Chief Complaint   Patient presents with    Respiratory Distress     Normally on 3L NC, came in on CPAP, given total of 50mcg push dose epi for low BP by EMS     I helped get her call light in reach.   No complaints    OBJECTIVE      Medications    Current Facility-Administered Medications: heparin (porcine) injection 5,000 Units, 5,000 Units, SubCUTAneous, 3 times per day  metoprolol succinate (TOPROL XL) extended release tablet 25 mg, 25 mg, Oral, Daily  digoxin (LANOXIN) injection 125 mcg, 125 mcg, IntraVENous, Daily  sodium chloride (OCEAN, BABY AYR) 0.65 % nasal spray 1 spray, 1 spray, Each Nostril, PRN  sucralfate (CARAFATE) tablet 1 g, 1 g, Oral, 4x Daily  rOPINIRole (REQUIP) tablet 1 mg, 1 mg, Oral, TID  atorvastatin (LIPITOR) tablet 20 mg, 20 mg, Oral, Nightly  ipratropium-albuterol (DUONEB) nebulizer solution 3 mL, 1 vial, Inhalation, 4x Daily  [Held by provider] insulin glargine (LANTUS) injection vial 13 Units, 13 Units, SubCUTAneous, BID  loperamide (IMODIUM) capsule 2 mg, 2 mg, Oral, 4x Daily PRN  [Held by provider] LORazepam (ATIVAN) tablet 0.5 mg, 0.5 mg, Oral, Nightly  insulin lispro (HUMALOG) injection vial 0-4 Units, 0-4 Units, SubCUTAneous, TID WC  insulin lispro (HUMALOG) injection vial 0-4 Units, 0-4 Units, SubCUTAneous, Nightly  budesonide (PULMICORT) nebulizer suspension 500 mcg, 0.5 mg, Nebulization, BID  sodium chloride flush 0.9 % injection 10 mL, 10 mL, IntraVENous, 2 times per day  sodium chloride flush 0.9 % injection 10 mL, 10 mL, IntraVENous, PRN  0.9 % sodium chloride infusion, , IntraVENous, PRN  promethazine (PHENERGAN) tablet 12.5 mg, 12.5 mg, Oral, Q6H PRN **OR** ondansetron (ZOFRAN) injection 4 mg, 4 mg, IntraVENous, Q6H PRN  polyethylene glycol (GLYCOLAX) packet 17 g, 17 g, Oral, Daily PRN  acetaminophen (TYLENOL) tablet 650 mg, 650 mg, Oral, Q6H PRN **OR** acetaminophen (TYLENOL) suppository 650 mg, 650 mg, Rectal, Q6H PRN  miconazole (MICOTIN) 2 % powder, , Topical, BID  arformoterol tartrate (BROVANA) nebulizer solution 15 mcg, 15 mcg, Nebulization, BID  pantoprazole (PROTONIX) tablet 40 mg, 40 mg, Oral, QAM AC  glucose chewable tablet 16 g, 4 tablet, Oral, PRN  dextrose bolus 10% 125 mL, 125 mL, IntraVENous, PRN **OR** dextrose bolus 10% 250 mL, 250 mL, IntraVENous, PRN  glucagon (rDNA) injection 1 mg, 1 mg, SubCUTAneous, PRN  dextrose 10 % infusion, , IntraVENous, Continuous PRN  [Held by provider] apixaban (ELIQUIS) tablet 5 mg, 5 mg, Oral, BID  mirtazapine (REMERON) tablet 7.5 mg, 7.5 mg, Oral, Nightly  carbidopa-levodopa (SINEMET CR)  MG per extended release tablet 1 tablet, 1 tablet, Oral, TID  sertraline (ZOLOFT) tablet 50 mg, 50 mg, Oral, Daily  montelukast (SINGULAIR) tablet 10 mg, 10 mg, Oral, Nightly  Physical    VITALS:  /65   Pulse 79   Temp 98.5 °F (36.9 °C)   Resp 20   Ht 5' (1.524 m)   Wt 219 lb 9.3 oz (99.6 kg)   SpO2 91%   BMI 42.88 kg/m²   No acute distress moist membranes   Normocephalic, without obvious abnormality, atraumatic, external ears without lesions,   Neck supple no cervical lymphadenopathy  Heart has a regular rate and rhythm no murmur  Lungs are clear to auscultation bilaterally with equal movements.  Anasarca: slightly improved on 2/14  good peripheral perfusion.  No significant rashes or new skin lesions.  No new focality on neuro exam which is overall grossly intact.  Affect and mood seem to be appropriate     Data    CBC:   Lab Results   Component Value Date/Time    WBC 6.8 02/13/2023 02:27 PM    RBC 3.32 02/13/2023 02:27 PM    HGB 8.2 02/13/2023 02:27 PM    HCT 31.9 02/13/2023 02:27 PM    MCV 96.1 02/13/2023 02:27 PM    MCH 24.7 02/13/2023 02:27 PM    MCHC 25.7 02/13/2023 02:27 PM    RDW 19.2 02/13/2023 02:27 PM     02/13/2023 02:27 PM    MPV 12.0 02/13/2023 02:27 PM     BMP:    Lab Results   Component Value Date/Time     02/14/2023  07:30 AM    K 3.7 02/14/2023 07:30 AM    K 3.6 02/12/2023 07:01 AM    CL 99 02/14/2023 07:30 AM    CO2 32 02/14/2023 07:30 AM    BUN 20 02/14/2023 07:30 AM    LABALBU 4.0 02/14/2023 07:30 AM    CREATININE 2.7 02/14/2023 07:30 AM    CALCIUM 8.4 02/14/2023 07:30 AM    GFRAA >60 10/16/2022 09:20 AM    LABGLOM 18 02/14/2023 07:30 AM    GLUCOSE 104 02/14/2023 07:30 AM       ASSESSMENT AND PLAN    Patient was admitted with dyspnea and hypotension. She was found to be in fluid overload. She was diuresed with minimal improvement. She was treated with Bumex drip. Patient reluctantly agreed to dialysis. She is slowly improving with dialysis. However, pleural effusion still persistent. Acute on chronic respiratory failure with hypoxia and hypercapnia: breathing treatment as ordered and bipap weaned. O2 as much as needs  DM type 2 complicated with nephropathy  CKD stage 3 now on dialysis  Hypotension: improved  Anemia due to CKD: hgb is stable. Rosa Sesay UTI: completed Zyvox  Parkinson's disease: overall stable. Continue home meds  Sleep Apnea: continue bipap as ordered. Pleural Effusion: per Pulm. DVT prophylaxis: Eliquis held for pleurex placement on 2/14  Full code.  Palliative care encounter  Disposition: will try ltach again via appeal letter by tamiko

## 2023-02-14 NOTE — CARE COORDINATION
SS NOTE: COVID NEGATIVE 1/17, PT WILL NEED A RAPID NEGATIVE COVID TEST IF SHE RETURNS TO [facility] [place]. Pt is on 7 liters of O2 and bipap at  night. Pt recd a tunneled HD catheter and HD initiated last week. Pt has a peripheral line. Pt's Eliquis in on hold. She has a genao catheter. Cardiology, ID, PT/OT and Palliative care are all involved. DR Macedo Washington signed the Appeal Letter today and sent back to [facility] today. [facility] is also still following pt. SS to continue. ESTHER Sullivan.2/14/2023.5:06PM.

## 2023-02-14 NOTE — PROGRESS NOTES
[facility] MD Una Ortiz MD Lore Hering, MD Rosaleen Blitz, BRADLEY Devlin, ANP  Yajaira Kramer, BRADLEY  Progress Note    2/14/2023    SUBJECTIVE:   1/20: Feeling little better today. Denies new complaint. Still tachypnea, though denies dyspnea no cp/palp Appetite ok ROS otherwise unremarkable    1//21 seen in her room on o2/nc bp stable weak poor po intake   2+ edema in LE     1/22 charts reviewed . On bipap    1/23 : on o2/nc . Still look hypervolemic     1/24 seen in her room comfortable o2/nc . Still with LE edema which seems to be multifactorial and will likely need to accept having some edema on board     1/25 sitting on edge of the bed working with pt . Still with considerable edema in LEs   BP stable     1/26 seen in her room reports of SOB with minimal activity , LE edema still signficant . 1/27 good UO On 5 L/o2/nc  Still with sob with activity Tachycardiac with low functional capacity     1/28: Asleep, resting and breathing comfortably on nasal cannula. Remains edematous. Ongoing fatigue and generalized weakness. 1/29: Hypotensive last evening, Bumex drip stopped, IV normal saline bolus administered to effect. Breathing little more easily though still on AVAPS. To be downgraded to CPAP shortly. Thirsty. Still markedly edematous. 1/30: Somnolent, though arousable. On CPAP. Ongoing fatigue and malaise with generalized weakness    1/31 seen in her room , skin wrinkling in LE on HFNC . BP on lower side  For thoracentesis today   Dw RN .     2/1: Seen while laying in bed, no acute distress. Tells me that she is thirsty. She is on heated high flow nasal canula. Feels that her breathing has improved slightly. Plan is for thoracentesis later this afternoon, could not be done yesterday due to elevated INR. Still with skin wrinkling to bilateral lower extremities.      2/2 1 L removed with thoracentesis   O2 requirement better on 8 L o2/nc   Very weak and deconditioned   Labile BP numbers    2/2 o2/nc LE UO ok remain weak decondtioned . Edema in LE getting somewhat worse    2/4 remain with significant depdent edema deconditioned in bed     2/5: Discussed with respiratory therapist: Just put on her CPAP after having tolerated nasal cannula throughout most of the day.  Remains bedbound, severely weak and debilitated.  Remains massively swollen, little change in the last 5 days.    2/6: Status quo.  Somnolent but arousable.  Desats and dyspneic without AVAPS.  Remains massively edematous    2/7:  resp status is status quo.  More awake, alert, interactive today.  Resumed bumex gtt yesterday.      2/8 for dialysis line in am   Alert oriented   Remain markedly hypervolemic     2/9: Sitting up in bed, on Bipap. Family is present at bedside. Markedly edematous. She is asking me if TDC placement will hurt. She reports dyspnea, but feels better on Bipap. She denies chest pain or palpitations. Plan is for TDC catheter placement with IR today.     2/10: Seen while on HD. Tolerating without complications. Tells me she is feeling much better. Still edematous, but edema has improved substantially. She is on nasal canula, eating breakfast. She had 2.7 L removed yesterday and 3L removed today.     2/11: Dialysis today notable for hypotension and only 2.1 L UF.  Currently asleep, denies new complaint.  Ongoing fatigue, malaise, generalized weakness.    2/12: Somewhat less anxious than she had been over the past several days.  Did tolerate roughly 3 hours off of CPAP consecutively yesterday.  Still mostly using CPAP throughout the day.  Swelling better.    2/13: Seen while in dialysis. Tolerating ultrafiltration without complications. Plan is to remove 3L today. Blood pressure is stable. She is on nasal canula. She tells me that she goes back and forth between nasal canula and CPAP. Plan is for a right sided pleurix catheter to be placed later today.     2/14: Seen  while in dialysis. Tolerating treatment without complication. Blood pressure is stable. She is on nasal canula. Tells me she continues to feel better each day. Still with significant abdominal swelling. PROBLEM LIST:    Principal Problem:    Acute on chronic respiratory failure with hypoxia and hypercapnia (HCC)  Active Problems:    Palliative care encounter  Resolved Problems:    * No resolved hospital problems. *         DIET:    ADULT DIET; Regular; 3 carb choices (45 gm/meal); Low Fat/Low Chol/High Fiber/2 gm Na; Moderately Thick (Honey); 1200 ml  ADULT ORAL NUTRITION SUPPLEMENT; Lunch, Dinner;  Other Oral Supplement; Gelatein 20     MEDS (scheduled):    heparin (porcine)  5,000 Units SubCUTAneous 3 times per day    metoprolol succinate  25 mg Oral Daily    digoxin  125 mcg IntraVENous Daily    sucralfate  1 g Oral 4x Daily    rOPINIRole  1 mg Oral TID    atorvastatin  20 mg Oral Nightly    ipratropium-albuterol  1 vial Inhalation 4x Daily    [Held by provider] insulin glargine  13 Units SubCUTAneous BID    [Held by provider] LORazepam  0.5 mg Oral Nightly    insulin lispro  0-4 Units SubCUTAneous TID WC    insulin lispro  0-4 Units SubCUTAneous Nightly    budesonide  0.5 mg Nebulization BID    sodium chloride flush  10 mL IntraVENous 2 times per day    miconazole   Topical BID    arformoterol tartrate  15 mcg Nebulization BID    pantoprazole  40 mg Oral QAM AC    [Held by provider] apixaban  5 mg Oral BID    mirtazapine  7.5 mg Oral Nightly    carbidopa-levodopa  1 tablet Oral TID    sertraline  50 mg Oral Daily    montelukast  10 mg Oral Nightly       MEDS (infusions):   sodium chloride 25 mL/hr at 02/04/23 0222    dextrose         MEDS (prn):  sodium chloride, loperamide, sodium chloride flush, sodium chloride, promethazine **OR** ondansetron, polyethylene glycol, acetaminophen **OR** acetaminophen, glucose, dextrose bolus **OR** dextrose bolus, glucagon (rDNA), dextrose    PHYSICAL EXAM:     Patient Vitals for the past 24 hrs:   BP Temp Temp src Pulse Resp SpO2 Weight   02/14/23 1143 115/65 98.5 °F (36.9 °C) -- 79 20 -- 219 lb 9.3 oz (99.6 kg)   02/14/23 1103 106/62 -- -- 74 -- -- --   02/14/23 1030 115/66 -- -- 87 -- -- --   02/14/23 1000 130/71 -- -- 88 -- -- --   02/14/23 0930 (!) 115/54 -- -- 68 -- -- --   02/14/23 0900 137/63 -- -- 80 -- -- --   02/14/23 0830 127/69 -- -- 72 -- -- --   02/14/23 0805 (!) 140/69 -- -- 81 -- -- --   02/14/23 0755 114/68 98.2 °F (36.8 °C) -- 77 20 -- 226 lb 10.1 oz (102.8 kg)   02/14/23 0650 -- -- -- -- 24 -- --   02/14/23 0600 -- -- -- -- -- -- 230 lb 6.1 oz (104.5 kg)   02/14/23 0406 98/63 97.5 °F (36.4 °C) Oral 85 20 91 % --   02/13/23 2312 -- -- -- 80 -- -- --   02/13/23 2243 -- -- -- -- 21 -- --   02/13/23 2030 (!) 96/50 -- -- 54 -- -- --   02/13/23 1545 102/60 97.8 °F (36.6 °C) Oral 75 20 92 % --     @      Intake/Output Summary (Last 24 hours) at 2/14/2023 1223  Last data filed at 2/14/2023 1143  Gross per 24 hour   Intake 300 ml   Output 3500 ml   Net -3200 ml           Wt Readings from Last 3 Encounters:   02/14/23 219 lb 9.3 oz (99.6 kg)   01/16/23 259 lb (117.5 kg)   01/16/23 259 lb (117.5 kg)     Age-appropriate morbidly obese white woman, though easily arousable, no apparent distress  NC/AT EOMI sclera conjunctive are clear and anicteric mucous membranes are moist  Neck soft supple trachea midline  Lungs clear to ausculation bilaterally, diminished in the bases. Poor inspiratory effort. Regular rhythm normal S1 and S2  Abdomen soft nontender nondistended normal active bowel sounds. Abdominal pannus has substantial edema  Distal extremities, thighs, sacrum have substantial chronic type nonpitting edema, +1/trace BLE edema.    Pulses 1+ x4  Moves all 4 extremities  Cranial nerves II through XII grossly intact  Awake, alert, interactive and appropriate  Skin tone consistent with chronic venous stasis distally      DATA:    Recent Labs     02/13/23  1427   WBC 6.8 HGB 8.2*   HCT 31.9*   MCV 96.1            Recent Labs     02/12/23  0701 02/13/23  1427 02/14/23  0730    140 139   K 3.6 3.8 3.7    99 99   CO2 33* 33* 32*   MG  --  1.6  --    PHOS  --  2.7  --    BUN 16 20 20   CREATININE 1.9* 2.4* 2.7*   ALT  --  <5 <5   AST  --  7 10   BILITOT  --  0.7 0.6   ALKPHOS  --  55 53         Lab Results   Component Value Date    LABPROT 0.5 (H) 01/18/2023    LABPROT 0.5 01/18/2023     On CT no hydro/signs of obstruction     Urine studies  U Na 21, U Cl 23 U prot:cr ratio 0.5    ASSESSMENT / RECOMMENDATIONS:       Assessment  1. Acute kidney injury urine lytes support decrease effective volume     2. CKD stage III, Baseline creatinine 1.4 to 1.7 mg/dL, secondary to diabetic nephropathy and renal microvascular atherosclerotic disease. 0.5 g proteinuria     3. Anemia due to CKD, phlebotomy associated prolonged hospitalization     4. Acute hypercapnic and hypoxic respiratory failure due to COPD exacerbation and pneumonia. Does not appear hypervolemic. 5.  Morbid obesity, BMI 50.6 kg per metered square     6. Edema/anasarca, chronic, multifactorial, due to venous insufficiency in the setting of morbid obesity, relative immobility, likely diastolic dysfunction though it was \"indeterminate\" on echo, the does have pulmonary hypertension, mild right-sided heart failure    7. Hypotension    8. Elevated bicarbonate represents compensation for chronic respiratory acidosis, as well as likely contraction alkalosis due to diuresis on the Bumex drip     Remains markedly hypervolemic, particular in her abdominal pannus extremities much relieved  Chest xray-large right pleural effusion   Tolerating hemodialysis without complications. Edema has improved substantially.      2.7 L removed 2/9  3 L removed 2/10  2.1 L removed 2/11  Mag-1.6  Pleurix catheter not yet placed, possibly later today    Recommendations  Daily UFWOD until dry weight is reached   Ultrafiltration again tomorrow   Repeat magnesium tomorrow   Continue supportive care  Follow labs, UO      Electronically signed by RASHARD Villegas CNP on 2/14/2023

## 2023-02-14 NOTE — PROGRESS NOTES
Comprehensive Nutrition Assessment    Type and Reason for Visit:  Reassess    Nutrition Recommendations/Plan:   Continue current diet   Will order Gelatein 20 BID (d/t fluid restriction & NTL) to optimize nutrition status. Continue to monitor. Malnutrition Assessment:  Malnutrition Status: At risk for malnutrition (Comment) (01/20/23 1432)    Context:  Chronic Illness     Findings of the 6 clinical characteristics of malnutrition:  Energy Intake:  No significant decrease in energy intake  Weight Loss:  Unable to assess     Body Fat Loss:  No significant body fat loss     Muscle Mass Loss:  No significant muscle mass loss    Fluid Accumulation:  Unable to assess     Strength:  Not Performed    Nutrition Assessment:    Pt admit w/ SHANTANU on CKD, Acute on chronic respiratory failure d/t COPD exacerbation and PNA, edema/anasarca chronic, pulmonary HTN. On fluid restriction, IV fluid resuscitation. PMHx of CHF, COPD, CKD, CAD, breast cancer, DM, SERENA, Parkinson's ds. Note pt still in need of the bipap. Note 1/30/2023 complete opacification of the rt hemithorax r/t pleural effusion, note 2/2 s/p thoracentesis (1000cc). Note 2/9 s/p tunneled dialysis catheter. Pt remains at nutritional risk d/t increased needs r/t HD & poor PO intake <25%. Pt on regular diet w/ NTL. Will order Gelatein 20 BID (d/t fluid restriction & NTL) to optimize nutrition status. Continue to monitor. Nutrition Related Findings:    A/O x4, obese/soft/rounded/nontender abd, +BS, flatus, TYRON, I/O -12.6L, +1 edema,  Cr 2. 6(H) Wound Type: Wound Consult Pending (R upper thigh redness, L pretibial popped blister)       Current Nutrition Intake & Therapies:    Average Meal Intake: 1-25%  Average Supplements Intake: None Ordered  ADULT DIET; Regular; 3 carb choices (45 gm/meal); Low Fat/Low Chol/High Fiber/2 gm Na; Moderately Thick (Honey); 1200 ml  ADULT ORAL NUTRITION SUPPLEMENT; Lunch, Dinner;  Other Oral Supplement; Gelatein 20    Anthropometric Measures:  Height: 5' (152.4 cm)  Ideal Body Weight (IBW): 100 lbs (45 kg)    Admission Body Weight: 262 lb 2 oz (118.9 kg) (1/17 bed scale)  Current Body Weight: 226 lb (102.5 kg) (2/13 bed), 273.3 % IBW. Weight Source: Bed Scale  Current BMI (kg/m2): 44.1  Usual Body Weight:  (JOHN d/t wt fluctuations in EMR)     Weight Adjustment For: No Adjustment     BMI Categories: Obese Class 3 (BMI 40.0 or greater)    Estimated Daily Nutrient Needs:  Energy Requirements Based On: Formula  Weight Used for Energy Requirements: Current  Energy (kcal/day): 1900-2000kcal/day  Weight Used for Protein Requirements: Ideal  Protein (g/day): 115-120g/day (2/5-2.7g/kg IBW)  Method Used for Fluid Requirements: 1 ml/kcal  Fluid (ml/day): per doctor (1200ml restriction)    Nutrition Diagnosis:   Inadequate oral intake related to impaired respiratory function (cardiac dysfunction,renal dysfunction) as evidenced by NPO or clear liquid status due to medical condition    Nutrition Interventions:   Food and/or Nutrient Delivery: Continue Current Diet, Start Oral Nutrition Supplement  Nutrition Education/Counseling: No recommendation at this time  Coordination of Nutrition Care: Continue to monitor while inpatient     Goals:     Goals: PO intake 50% or greater  Specify Other Goals: monitor nutrition progression    Nutrition Monitoring and Evaluation:   Behavioral-Environmental Outcomes: None Identified  Food/Nutrient Intake Outcomes: Food and Nutrient Intake, Supplement Intake  Physical Signs/Symptoms Outcomes: Biochemical Data, Chewing or Swallowing, GI Status, Fluid Status or Edema, Weight, Skin, Nutrition Focused Physical Findings    Discharge Planning:     Too soon to determine     Chucho Ortega RD  Contact: [phone]

## 2023-02-14 NOTE — PROGRESS NOTES
Pulmonary/Critical Care Progress Note    We are following patient for severe diastolic heart failure requiring Pleurx catheter placement, cor pulmonale, obstructive sleep apnea, chronic kidney disease    SUBJECTIVE:  The patient is breathing much easier now that 1000 cc of pleural fluid was removed from the right pleural space and a Pleurx catheter has been inserted which will allow access to pleural fluid removal, probably best done every other day. This should minimize or at least reduce the amount of time she requires noninvasive ventilation during the day.     MEDICATIONS:   heparin (porcine)  5,000 Units SubCUTAneous 3 times per day    metoprolol succinate  25 mg Oral Daily    digoxin  125 mcg IntraVENous Daily    sucralfate  1 g Oral 4x Daily    rOPINIRole  1 mg Oral TID    atorvastatin  20 mg Oral Nightly    ipratropium-albuterol  1 vial Inhalation 4x Daily    [Held by provider] insulin glargine  13 Units SubCUTAneous BID    [Held by provider] LORazepam  0.5 mg Oral Nightly    insulin lispro  0-4 Units SubCUTAneous TID WC    insulin lispro  0-4 Units SubCUTAneous Nightly    budesonide  0.5 mg Nebulization BID    sodium chloride flush  10 mL IntraVENous 2 times per day    miconazole   Topical BID    arformoterol tartrate  15 mcg Nebulization BID    pantoprazole  40 mg Oral QAM AC    [Held by provider] apixaban  5 mg Oral BID    mirtazapine  7.5 mg Oral Nightly    carbidopa-levodopa  1 tablet Oral TID    sertraline  50 mg Oral Daily    montelukast  10 mg Oral Nightly      sodium chloride 25 mL/hr at 02/04/23 0222    dextrose       sodium chloride, loperamide, sodium chloride flush, sodium chloride, promethazine **OR** ondansetron, polyethylene glycol, acetaminophen **OR** acetaminophen, glucose, dextrose bolus **OR** dextrose bolus, glucagon (rDNA), dextrose      REVIEW OF SYSTEMS:      Patient cannot answer questions because she is very tired    OBJECTIVE:  Vitals:    02/14/23 1341   BP:    Pulse: 91   Resp: Temp:    SpO2: 90%     FiO2 : 50 %  O2 Flow Rate (L/min): 7 L/min  O2 Device: PAP (positive airway pressure)    PHYSICAL EXAM:  Constitutional: No fever, chills, diaphoresis  Skin: No skin rash, no skin breakdown  HEENT: Mucous membranes moist  Neck: Large neck circumference  Cardiovascular: S1, S2 irregular. No S3 or rubs  Respiratory: Much better breath sounds over right posterior hemithorax, left side is clear  Gastrointestinal: Soft, obese, nontender  Genitourinary: No CVA tenderness  Extremities: No clubbing, cyanosis, or edema, chronic venous stasis changes are seen at both ankles  Neurological: Awake alert and oriented. No focal motor deficits  Psychological: In good spirits.   Appropriate affect    LABS:  WBC   Date Value Ref Range Status   02/13/2023 6.8 4.5 - 11.5 E9/L Final   02/08/2023 4.5 4.5 - 11.5 E9/L Final   02/06/2023 6.1 4.5 - 11.5 E9/L Final     Hemoglobin   Date Value Ref Range Status   02/13/2023 8.2 (L) 11.5 - 15.5 g/dL Final   02/08/2023 8.3 (L) 11.5 - 15.5 g/dL Final   02/06/2023 7.8 (L) 11.5 - 15.5 g/dL Final     Hematocrit   Date Value Ref Range Status   02/13/2023 31.9 (L) 34.0 - 48.0 % Final   02/08/2023 30.1 (L) 34.0 - 48.0 % Final   02/06/2023 27.5 (L) 34.0 - 48.0 % Final     MCV   Date Value Ref Range Status   02/13/2023 96.1 80.0 - 99.9 fL Final   02/08/2023 89.3 80.0 - 99.9 fL Final   02/06/2023 88.1 80.0 - 99.9 fL Final     Platelets   Date Value Ref Range Status   02/13/2023 153 130 - 450 E9/L Final   02/08/2023 159 130 - 450 E9/L Final   02/06/2023 135 130 - 450 E9/L Final     Sodium   Date Value Ref Range Status   02/14/2023 139 132 - 146 mmol/L Final   02/13/2023 140 132 - 146 mmol/L Final   02/12/2023 142 132 - 146 mmol/L Final     Potassium   Date Value Ref Range Status   02/14/2023 3.7 3.5 - 5.0 mmol/L Final   02/13/2023 3.8 3.5 - 5.0 mmol/L Final     Potassium reflex Magnesium   Date Value Ref Range Status   02/12/2023 3.6 3.5 - 5.0 mmol/L Final   02/11/2023 3.4 (L) 3.5 - 5.0 mmol/L Final   02/10/2023 3.8 3.5 - 5.0 mmol/L Final     Chloride   Date Value Ref Range Status   02/14/2023 99 98 - 107 mmol/L Final   02/13/2023 99 98 - 107 mmol/L Final   02/12/2023 101 98 - 107 mmol/L Final     CO2   Date Value Ref Range Status   02/14/2023 32 (H) 22 - 29 mmol/L Final   02/13/2023 33 (H) 22 - 29 mmol/L Final   02/12/2023 33 (H) 22 - 29 mmol/L Final     BUN   Date Value Ref Range Status   02/14/2023 20 6 - 23 mg/dL Final   02/13/2023 20 6 - 23 mg/dL Final   02/12/2023 16 6 - 23 mg/dL Final     Creatinine   Date Value Ref Range Status   02/14/2023 2.7 (H) 0.5 - 1.0 mg/dL Final   02/13/2023 2.4 (H) 0.5 - 1.0 mg/dL Final   02/12/2023 1.9 (H) 0.5 - 1.0 mg/dL Final     Glucose   Date Value Ref Range Status   02/14/2023 104 (H) 74 - 99 mg/dL Final   02/13/2023 157 (H) 74 - 99 mg/dL Final   02/12/2023 121 (H) 74 - 99 mg/dL Final     Calcium   Date Value Ref Range Status   02/14/2023 8.4 (L) 8.6 - 10.2 mg/dL Final   02/13/2023 8.4 (L) 8.6 - 10.2 mg/dL Final   02/12/2023 8.4 (L) 8.6 - 10.2 mg/dL Final     Total Protein   Date Value Ref Range Status   02/14/2023 5.8 (L) 6.4 - 8.3 g/dL Final   02/13/2023 6.1 (L) 6.4 - 8.3 g/dL Final   02/09/2023 5.4 (L) 6.4 - 8.3 g/dL Final     Albumin   Date Value Ref Range Status   02/14/2023 4.0 3.5 - 5.2 g/dL Final   02/13/2023 4.2 3.5 - 5.2 g/dL Final   02/09/2023 3.4 (L) 3.5 - 5.2 g/dL Final     Total Bilirubin   Date Value Ref Range Status   02/14/2023 0.6 0.0 - 1.2 mg/dL Final   02/13/2023 0.7 0.0 - 1.2 mg/dL Final   02/09/2023 0.6 0.0 - 1.2 mg/dL Final     Alkaline Phosphatase   Date Value Ref Range Status   02/14/2023 53 35 - 104 U/L Final   02/13/2023 55 35 - 104 U/L Final   02/09/2023 55 35 - 104 U/L Final     AST   Date Value Ref Range Status   02/14/2023 10 0 - 31 U/L Final   02/13/2023 7 0 - 31 U/L Final   02/09/2023 8 0 - 31 U/L Final     ALT   Date Value Ref Range Status   02/14/2023 <5 0 - 32 U/L Final   02/13/2023 <5 0 - 32 U/L Final   02/09/2023 <5 0 - 32 U/L Final     Est, Glom Filt Rate   Date Value Ref Range Status   02/14/2023 18 >=60 mL/min/1.73 Final     Comment:     Pediatric calculator link  [email or web address]  Effective Oct 3, 2022  These results are not intended for use in patients  <25years of age. eGFR results are calculated without  a race factor using the 2021 CKD-EPI equation. Careful  clinical correlation is recommended, particularly when  comparing to results calculated using previous equations. The CKD-EPI equation is less accurate in patients with  extremes of muscle mass, extra-renal metabolism of  creatinine, excessive creatinine ingestion, or following  therapy that affects renal tubular secretion. 02/13/2023 21 >=60 mL/min/1.73 Final     Comment:     Pediatric calculator link  [email or web address]. [email or web address]  Effective Oct 3, 2022  These results are not intended for use in patients  <25years of age. eGFR results are calculated without  a race factor using the 2021 CKD-EPI equation. Careful  clinical correlation is recommended, particularly when  comparing to results calculated using previous equations. The CKD-EPI equation is less accurate in patients with  extremes of muscle mass, extra-renal metabolism of  creatinine, excessive creatinine ingestion, or following  therapy that affects renal tubular secretion. 02/12/2023 27 >=60 mL/min/1.73 Final     Comment:     Pediatric calculator link  [email or web address]  Effective Oct 3, 2022  These results are not intended for use in patients  <25years of age. eGFR results are calculated without  a race factor using the 2021 CKD-EPI equation. Careful  clinical correlation is recommended, particularly when  comparing to results calculated using previous equations.   The CKD-EPI equation is less accurate in patients with  extremes of muscle mass, extra-renal metabolism of  creatinine, excessive creatinine ingestion, or following  therapy that affects renal tubular secretion. GFR    Date Value Ref Range Status   10/16/2022 >60  Final   10/14/2022 59  Final   10/13/2022 >60  Final     Magnesium   Date Value Ref Range Status   02/13/2023 1.6 1.6 - 2.6 mg/dL Final   02/11/2023 1.7 1.6 - 2.6 mg/dL Final   02/09/2023 1.6 1.6 - 2.6 mg/dL Final     Phosphorus   Date Value Ref Range Status   02/13/2023 2.7 2.5 - 4.5 mg/dL Final   02/08/2023 4.1 2.5 - 4.5 mg/dL Final   02/07/2023 4.3 2.5 - 4.5 mg/dL Final     No results for input(s): PH, PO2, PCO2, HCO3, BE, O2SAT in the last 72 hours. RADIOLOGY:  IR FLUORO GUIDED CVA DEVICE PLMT/REPLACE/REMOVAL   Final Result   Successful placement of a tunneled dialysis catheter via the right internal   jugular vein. Administration of conscious sedation. IR ULTRASOUND GUIDANCE VASCULAR ACCESS   Final Result   Successful placement of a tunneled dialysis catheter via the right internal   jugular vein. Administration of conscious sedation. XR CHEST PORTABLE   Final Result   Large right pleural effusion with likely adjacent compressive atelectasis. Suspected atelectasis at the posterior basal segment of the left lower lobe. XR CHEST PORTABLE   Final Result   Lesser amount of aeration of the right lung         XR CHEST PORTABLE   Final Result   1. Slight improved aeration of the right lung when compared to prior study   2. The left lung is grossly clear   3. Cardiomegaly         XR CHEST PORTABLE   Final Result   1. New opacification seen within the right lung base which could represent   partial collapse of the right lower lobe   2. Reaccumulating right pleural effusion   3. Right upper lobe airspace disease   4. There is no pneumothorax   5. The left lung is clear.          XR CHEST 1 VIEW   Final Result   Improved ventilation of the right lung, status post thoracentesis with   possible tiny less than 5% right apical pneumothorax. .      Cavitary lesion in the right midlung field which may either represent a   necrotic malignancy versus pneumatosis. Consider CT scan. IR GUIDED THORACENTESIS PLEURAL   Final Result   Successful ultrasound guided thoracentesis. XR CHEST PORTABLE   Final Result   Complete opacification of the right hemithorax related to pleural effusion. Modest contralateral infiltrate and or atelectasis at the left lower chest.         CT ABDOMEN PELVIS WO CONTRAST Additional Contrast? None   Final Result   No nephroureterolithiasis or hydronephrosis. Splenomegaly. Small volume of perihepatic ascites. At least moderate-sized partially imaged right pleural effusion with   bibasilar atelectasis. Several locules of gas which appear to be within the endometrium at the level   of the uterine fundus, of uncertain etiology or clinical significance. Please correlate clinically. Diffuse anasarca throughout the soft tissues. Cardiomegaly. XR CHEST PORTABLE   Final Result   Stable right pleural effusion. IR GUIDED PERC PLEURAL DRAIN W CATH INSERT    (Results Pending)   XR CHEST 1 VIEW    (Results Pending)   IR GUIDED THORACENTESIS PLEURAL    (Results Pending)           PROBLEM LIST:  Principal Problem:    Acute on chronic respiratory failure with hypoxia and hypercapnia (HCC)  Active Problems:    Palliative care encounter  Resolved Problems:    * No resolved hospital problems. *      IMPRESSION:  Acute hypoxemic hypercapnic respiratory failure  Severe diastolic heart failure resulting in large pleural effusions  Severe obstructive sleep apnea  Obesity  Cor pulmonale  SHANTANU, CKD    PLAN:  Chest x-ray in a.m.   Drain pleural fluid every other day and discard  BiPAP nightly      Electronically signed by Carroll Vasquez MD on 2/14/2023 at 4:07 PM

## 2023-02-15 ENCOUNTER — APPOINTMENT (OUTPATIENT)
Dept: GENERAL RADIOLOGY | Age: 73
End: 2023-02-15
Payer: MEDICARE

## 2023-02-15 LAB
ALBUMIN SERPL-MCNC: 3.9 G/DL (ref 3.5–5.2)
ALP BLD-CCNC: 48 U/L (ref 35–104)
ALT SERPL-CCNC: <5 U/L (ref 0–32)
ANION GAP SERPL CALCULATED.3IONS-SCNC: 11 MMOL/L (ref 7–16)
AST SERPL-CCNC: <5 U/L (ref 0–31)
BILIRUB SERPL-MCNC: 0.8 MG/DL (ref 0–1.2)
BUN BLDV-MCNC: 27 MG/DL (ref 6–23)
CALCIUM SERPL-MCNC: 8.5 MG/DL (ref 8.6–10.2)
CHLORIDE BLD-SCNC: 98 MMOL/L (ref 98–107)
CO2: 28 MMOL/L (ref 22–29)
CREAT SERPL-MCNC: 3.2 MG/DL (ref 0.5–1)
EKG ATRIAL RATE: 91 BPM
EKG Q-T INTERVAL: 330 MS
EKG QRS DURATION: 82 MS
EKG QTC CALCULATION (BAZETT): 312 MS
EKG R AXIS: 87 DEGREES
EKG T AXIS: -79 DEGREES
EKG VENTRICULAR RATE: 54 BPM
GFR SERPL CREATININE-BSD FRML MDRD: 15 ML/MIN/1.73
GLUCOSE BLD-MCNC: 92 MG/DL (ref 74–99)
MAGNESIUM: 1.6 MG/DL (ref 1.6–2.6)
METER GLUCOSE: 118 MG/DL (ref 74–99)
METER GLUCOSE: 129 MG/DL (ref 74–99)
METER GLUCOSE: 131 MG/DL (ref 74–99)
METER GLUCOSE: 95 MG/DL (ref 74–99)
POTASSIUM SERPL-SCNC: 4.1 MMOL/L (ref 3.5–5)
SODIUM BLD-SCNC: 137 MMOL/L (ref 132–146)
TOTAL PROTEIN: 5.6 G/DL (ref 6.4–8.3)

## 2023-02-15 PROCEDURE — 76937 US GUIDE VASCULAR ACCESS: CPT

## 2023-02-15 PROCEDURE — 6370000000 HC RX 637 (ALT 250 FOR IP): Performed by: NURSE PRACTITIONER

## 2023-02-15 PROCEDURE — 36415 COLL VENOUS BLD VENIPUNCTURE: CPT

## 2023-02-15 PROCEDURE — P9047 ALBUMIN (HUMAN), 25%, 50ML: HCPCS

## 2023-02-15 PROCEDURE — 82962 GLUCOSE BLOOD TEST: CPT

## 2023-02-15 PROCEDURE — 71045 X-RAY EXAM CHEST 1 VIEW: CPT

## 2023-02-15 PROCEDURE — 6360000002 HC RX W HCPCS

## 2023-02-15 PROCEDURE — 94640 AIRWAY INHALATION TREATMENT: CPT

## 2023-02-15 PROCEDURE — 94660 CPAP INITIATION&MGMT: CPT

## 2023-02-15 PROCEDURE — 99232 SBSQ HOSP IP/OBS MODERATE 35: CPT | Performed by: INTERNAL MEDICINE

## 2023-02-15 PROCEDURE — 83735 ASSAY OF MAGNESIUM: CPT

## 2023-02-15 PROCEDURE — 2580000003 HC RX 258: Performed by: INTERNAL MEDICINE

## 2023-02-15 PROCEDURE — 6370000000 HC RX 637 (ALT 250 FOR IP): Performed by: INTERNAL MEDICINE

## 2023-02-15 PROCEDURE — 6360000002 HC RX W HCPCS: Performed by: NURSE PRACTITIONER

## 2023-02-15 PROCEDURE — 93010 ELECTROCARDIOGRAM REPORT: CPT | Performed by: INTERNAL MEDICINE

## 2023-02-15 PROCEDURE — 6360000002 HC RX W HCPCS: Performed by: INTERNAL MEDICINE

## 2023-02-15 PROCEDURE — 2060000000 HC ICU INTERMEDIATE R&B

## 2023-02-15 PROCEDURE — 93005 ELECTROCARDIOGRAM TRACING: CPT | Performed by: INTERNAL MEDICINE

## 2023-02-15 PROCEDURE — 2700000000 HC OXYGEN THERAPY PER DAY

## 2023-02-15 PROCEDURE — 80053 COMPREHEN METABOLIC PANEL: CPT

## 2023-02-15 RX ORDER — MAGNESIUM OXIDE 400 MG/1
400 TABLET ORAL DAILY
Status: DISCONTINUED | OUTPATIENT
Start: 2023-02-15 | End: 2023-02-24 | Stop reason: HOSPADM

## 2023-02-15 RX ORDER — ALBUMIN (HUMAN) 12.5 G/50ML
SOLUTION INTRAVENOUS
Status: COMPLETED
Start: 2023-02-15 | End: 2023-02-15

## 2023-02-15 RX ORDER — ALBUMIN (HUMAN) 12.5 G/50ML
25 SOLUTION INTRAVENOUS ONCE
Status: COMPLETED | OUTPATIENT
Start: 2023-02-15 | End: 2023-02-15

## 2023-02-15 RX ORDER — SODIUM CHLORIDE 9 MG/ML
INJECTION, SOLUTION INTRAVENOUS CONTINUOUS
Status: DISCONTINUED | OUTPATIENT
Start: 2023-02-15 | End: 2023-02-15

## 2023-02-15 RX ADMIN — MIRTAZAPINE 7.5 MG: 15 TABLET, FILM COATED ORAL at 23:09

## 2023-02-15 RX ADMIN — PANTOPRAZOLE SODIUM 40 MG: 40 TABLET, DELAYED RELEASE ORAL at 06:16

## 2023-02-15 RX ADMIN — ROPINIROLE HYDROCHLORIDE 1 MG: 1 TABLET, FILM COATED ORAL at 23:09

## 2023-02-15 RX ADMIN — IPRATROPIUM BROMIDE AND ALBUTEROL SULFATE 3 ML: .5; 2.5 SOLUTION RESPIRATORY (INHALATION) at 04:09

## 2023-02-15 RX ADMIN — CARBIDOPA AND LEVODOPA 1 TABLET: 50; 200 TABLET, EXTENDED RELEASE ORAL at 08:31

## 2023-02-15 RX ADMIN — ARFORMOTEROL TARTRATE 15 MCG: 15 SOLUTION RESPIRATORY (INHALATION) at 19:12

## 2023-02-15 RX ADMIN — MIDODRINE HYDROCHLORIDE 10 MG: 5 TABLET ORAL at 06:16

## 2023-02-15 RX ADMIN — MAGNESIUM OXIDE 400 MG: 400 TABLET ORAL at 12:56

## 2023-02-15 RX ADMIN — HEPARIN SODIUM 5000 UNITS: 5000 INJECTION INTRAVENOUS; SUBCUTANEOUS at 06:16

## 2023-02-15 RX ADMIN — MONTELUKAST SODIUM 10 MG: 10 TABLET, FILM COATED ORAL at 23:08

## 2023-02-15 RX ADMIN — Medication 10 ML: at 23:46

## 2023-02-15 RX ADMIN — IPRATROPIUM BROMIDE AND ALBUTEROL SULFATE 3 ML: .5; 2.5 SOLUTION RESPIRATORY (INHALATION) at 10:16

## 2023-02-15 RX ADMIN — BUDESONIDE 500 MCG: 0.5 SUSPENSION RESPIRATORY (INHALATION) at 19:12

## 2023-02-15 RX ADMIN — ROPINIROLE HYDROCHLORIDE 1 MG: 1 TABLET, FILM COATED ORAL at 15:46

## 2023-02-15 RX ADMIN — ANTI-FUNGAL POWDER MICONAZOLE NITRATE TALC FREE: 1.42 POWDER TOPICAL at 18:29

## 2023-02-15 RX ADMIN — SUCRALFATE 1 G: 1 TABLET ORAL at 23:09

## 2023-02-15 RX ADMIN — ARFORMOTEROL TARTRATE 15 MCG: 15 SOLUTION RESPIRATORY (INHALATION) at 04:09

## 2023-02-15 RX ADMIN — ANTI-FUNGAL POWDER MICONAZOLE NITRATE TALC FREE: 1.42 POWDER TOPICAL at 23:18

## 2023-02-15 RX ADMIN — IPRATROPIUM BROMIDE AND ALBUTEROL SULFATE 3 ML: .5; 2.5 SOLUTION RESPIRATORY (INHALATION) at 14:44

## 2023-02-15 RX ADMIN — ROPINIROLE HYDROCHLORIDE 1 MG: 1 TABLET, FILM COATED ORAL at 08:31

## 2023-02-15 RX ADMIN — IPRATROPIUM BROMIDE AND ALBUTEROL SULFATE 3 ML: .5; 2.5 SOLUTION RESPIRATORY (INHALATION) at 19:12

## 2023-02-15 RX ADMIN — SUCRALFATE 1 G: 1 TABLET ORAL at 08:31

## 2023-02-15 RX ADMIN — HEPARIN SODIUM 5000 UNITS: 5000 INJECTION INTRAVENOUS; SUBCUTANEOUS at 23:08

## 2023-02-15 RX ADMIN — SERTRALINE 50 MG: 50 TABLET, FILM COATED ORAL at 08:31

## 2023-02-15 RX ADMIN — SUCRALFATE 1 G: 1 TABLET ORAL at 18:28

## 2023-02-15 RX ADMIN — ATORVASTATIN CALCIUM 20 MG: 20 TABLET, FILM COATED ORAL at 23:09

## 2023-02-15 RX ADMIN — MIDODRINE HYDROCHLORIDE 10 MG: 5 TABLET ORAL at 12:56

## 2023-02-15 RX ADMIN — CARBIDOPA AND LEVODOPA 1 TABLET: 50; 200 TABLET, EXTENDED RELEASE ORAL at 23:09

## 2023-02-15 RX ADMIN — ALBUMIN (HUMAN) 25 G: 12.5 SOLUTION INTRAVENOUS at 07:46

## 2023-02-15 RX ADMIN — ALBUMIN (HUMAN) 25 G: 0.25 INJECTION, SOLUTION INTRAVENOUS at 07:46

## 2023-02-15 RX ADMIN — SODIUM CHLORIDE: 9 INJECTION, SOLUTION INTRAVENOUS at 08:28

## 2023-02-15 RX ADMIN — CARBIDOPA AND LEVODOPA 1 TABLET: 50; 200 TABLET, EXTENDED RELEASE ORAL at 15:46

## 2023-02-15 RX ADMIN — HEPARIN SODIUM 5000 UNITS: 5000 INJECTION INTRAVENOUS; SUBCUTANEOUS at 15:46

## 2023-02-15 RX ADMIN — BUDESONIDE 500 MCG: 0.5 SUSPENSION RESPIRATORY (INHALATION) at 04:09

## 2023-02-15 NOTE — PROGRESS NOTES
Department of Internal Medicine  General Internal Medicine  Attending Progress Note  Chief Complaint   Patient presents with    Respiratory Distress     Normally on 3L NC, came in on CPAP, given total of 50mcg push dose epi for low BP by EMS     No complaints.    She was preoccupied with phone call today    OBJECTIVE      Medications    Current Facility-Administered Medications: 0.9 % sodium chloride infusion, , IntraVENous, Continuous  magnesium oxide (MAG-OX) tablet 400 mg, 400 mg, Oral, Daily  midodrine (PROAMATINE) tablet 10 mg, 10 mg, Oral, TID WC  heparin (porcine) injection 5,000 Units, 5,000 Units, SubCUTAneous, 3 times per day  metoprolol succinate (TOPROL XL) extended release tablet 25 mg, 25 mg, Oral, Daily  digoxin (LANOXIN) injection 125 mcg, 125 mcg, IntraVENous, Daily  sodium chloride (OCEAN, BABY AYR) 0.65 % nasal spray 1 spray, 1 spray, Each Nostril, PRN  sucralfate (CARAFATE) tablet 1 g, 1 g, Oral, 4x Daily  rOPINIRole (REQUIP) tablet 1 mg, 1 mg, Oral, TID  atorvastatin (LIPITOR) tablet 20 mg, 20 mg, Oral, Nightly  ipratropium-albuterol (DUONEB) nebulizer solution 3 mL, 1 vial, Inhalation, 4x Daily  [Held by provider] insulin glargine (LANTUS) injection vial 13 Units, 13 Units, SubCUTAneous, BID  loperamide (IMODIUM) capsule 2 mg, 2 mg, Oral, 4x Daily PRN  [Held by provider] LORazepam (ATIVAN) tablet 0.5 mg, 0.5 mg, Oral, Nightly  insulin lispro (HUMALOG) injection vial 0-4 Units, 0-4 Units, SubCUTAneous, TID WC  insulin lispro (HUMALOG) injection vial 0-4 Units, 0-4 Units, SubCUTAneous, Nightly  budesonide (PULMICORT) nebulizer suspension 500 mcg, 0.5 mg, Nebulization, BID  sodium chloride flush 0.9 % injection 10 mL, 10 mL, IntraVENous, 2 times per day  sodium chloride flush 0.9 % injection 10 mL, 10 mL, IntraVENous, PRN  0.9 % sodium chloride infusion, , IntraVENous, PRN  promethazine (PHENERGAN) tablet 12.5 mg, 12.5 mg, Oral, Q6H PRN **OR** ondansetron (ZOFRAN) injection 4 mg, 4 mg, IntraVENous, Q6H PRN  polyethylene glycol (GLYCOLAX) packet 17 g, 17 g, Oral, Daily PRN  acetaminophen (TYLENOL) tablet 650 mg, 650 mg, Oral, Q6H PRN **OR** acetaminophen (TYLENOL) suppository 650 mg, 650 mg, Rectal, Q6H PRN  miconazole (MICOTIN) 2 % powder, , Topical, BID  arformoterol tartrate (BROVANA) nebulizer solution 15 mcg, 15 mcg, Nebulization, BID  pantoprazole (PROTONIX) tablet 40 mg, 40 mg, Oral, QAM AC  glucose chewable tablet 16 g, 4 tablet, Oral, PRN  dextrose bolus 10% 125 mL, 125 mL, IntraVENous, PRN **OR** dextrose bolus 10% 250 mL, 250 mL, IntraVENous, PRN  glucagon (rDNA) injection 1 mg, 1 mg, SubCUTAneous, PRN  dextrose 10 % infusion, , IntraVENous, Continuous PRN  [Held by provider] apixaban (ELIQUIS) tablet 5 mg, 5 mg, Oral, BID  mirtazapine (REMERON) tablet 7.5 mg, 7.5 mg, Oral, Nightly  carbidopa-levodopa (SINEMET CR)  MG per extended release tablet 1 tablet, 1 tablet, Oral, TID  sertraline (ZOLOFT) tablet 50 mg, 50 mg, Oral, Daily  montelukast (SINGULAIR) tablet 10 mg, 10 mg, Oral, Nightly  Physical    VITALS:  BP (!) 111/54   Pulse 54   Temp 98.1 °F (36.7 °C) (Oral)   Resp 20   Ht 5' (1.524 m)   Wt 219 lb 9.3 oz (99.6 kg)   SpO2 90%   BMI 42.88 kg/m²   No acute distress moist membranes   Normocephalic, without obvious abnormality, atraumatic, external ears without lesions,   Neck supple no cervical lymphadenopathy  Heart has a regular rate and rhythm no murmur  Lungs are clear to auscultation bilaterally with equal movements. Anasarca: more improved on 2/15  good peripheral perfusion. No significant rashes or new skin lesions. No new focality on neuro exam which is overall grossly intact.   Affect and mood seem to be appropriate     Data    CBC:   Lab Results   Component Value Date/Time    WBC 6.8 02/13/2023 02:27 PM    RBC 3.32 02/13/2023 02:27 PM    HGB 8.2 02/13/2023 02:27 PM    HCT 31.9 02/13/2023 02:27 PM    MCV 96.1 02/13/2023 02:27 PM    MCH 24.7 02/13/2023 02:27 PM    MCHC 25.7 02/13/2023 02:27 PM    RDW 19.2 02/13/2023 02:27 PM     02/13/2023 02:27 PM    MPV 12.0 02/13/2023 02:27 PM     BMP:    Lab Results   Component Value Date/Time     02/15/2023 08:48 AM    K 4.1 02/15/2023 08:48 AM    K 3.6 02/12/2023 07:01 AM    CL 98 02/15/2023 08:48 AM    CO2 28 02/15/2023 08:48 AM    BUN 27 02/15/2023 08:48 AM    LABALBU 3.9 02/15/2023 08:48 AM    CREATININE 3.2 02/15/2023 08:48 AM    CALCIUM 8.5 02/15/2023 08:48 AM    GFRAA >60 10/16/2022 09:20 AM    LABGLOM 15 02/15/2023 08:48 AM    GLUCOSE 92 02/15/2023 08:48 AM       ASSESSMENT AND PLAN    Patient was admitted with dyspnea and hypotension. She was found to be in fluid overload. She was diuresed with minimal improvement. She was treated with Bumex drip. Patient reluctantly agreed to dialysis. She is slowly improving with dialysis. However, pleural effusion still persistent. Acute on chronic respiratory failure with hypoxia and hypercapnia: breathing treatment . bipap prn. O2 as much as needs  DM type 2 complicated with nephropathy  CKD stage 3 now on dialysis  Hypotension: improved  Anemia due to CKD: hgb is stable. Juan Lucianory UTI: completed Zyvox  Parkinson's disease: overall stable. Continue home meds  Sleep Apnea: continue bipap as ordered. Pleural Effusion: per Pulm. DVT prophylaxis: Eliquis held for pleurex placement on 2/14  Full code. Palliative care encounter. POA is dtr [employer].   There is a code for family members to call in. RN staff continues to report problematic interactions with patient's children  Disposition: will try ltach again via appeal letter by tamiko

## 2023-02-15 NOTE — PROGRESS NOTES
Physical Therapy  Physical Therapy    Room #:   9725/4043-79    Date: 2/15/2023       Patient Name: Alvarez Galvez  : [id number]      MRN: [id number]     Patient unavailable for physical therapy treatment due to unavailable/not appropriate per nursing due to unstable vitals.       Fátima Mcgovern, PT

## 2023-02-15 NOTE — PROGRESS NOTES
[facility] MD April Singh MD Wilton Handler, MD Tasia Russell, BRADLEY Devlin, NUNU Villarreal CNP  Progress Note    2/15/2023    SUBJECTIVE:   1/20: Feeling little better today. Denies new complaint. Still tachypnea, though denies dyspnea no cp/palp Appetite ok ROS otherwise unremarkable    1//21 seen in her room on o2/nc bp stable weak poor po intake   2+ edema in LE     1/22 charts reviewed . On bipap    1/23 : on o2/nc . Still look hypervolemic     1/24 seen in her room comfortable o2/nc . Still with LE edema which seems to be multifactorial and will likely need to accept having some edema on board     1/25 sitting on edge of the bed working with pt . Still with considerable edema in LEs   BP stable     1/26 seen in her room reports of SOB with minimal activity , LE edema still signficant . 1/27 good UO On 5 L/o2/nc  Still with sob with activity Tachycardiac with low functional capacity     1/28: Asleep, resting and breathing comfortably on nasal cannula. Remains edematous. Ongoing fatigue and generalized weakness. 1/29: Hypotensive last evening, Bumex drip stopped, IV normal saline bolus administered to effect. Breathing little more easily though still on AVAPS. To be downgraded to CPAP shortly. Thirsty. Still markedly edematous. 1/30: Somnolent, though arousable. On CPAP. Ongoing fatigue and malaise with generalized weakness    1/31 seen in her room , skin wrinkling in LE on HFNC . BP on lower side  For thoracentesis today   Dw RN .     2/1: Seen while laying in bed, no acute distress. Tells me that she is thirsty. She is on heated high flow nasal canula. Feels that her breathing has improved slightly. Plan is for thoracentesis later this afternoon, could not be done yesterday due to elevated INR. Still with skin wrinkling to bilateral lower extremities.      2/2 1 L removed with thoracentesis   O2 requirement better on 8 L o2/nc Very weak and deconditioned   Labile BP numbers    2/2 o2/nc LE UO ok remain weak decondtioned . Edema in LE getting somewhat worse    2/4 remain with significant depdent edema deconditioned in bed     2/5: Discussed with respiratory therapist: Just put on her CPAP after having tolerated nasal cannula throughout most of the day. Remains bedbound, severely weak and debilitated. Remains massively swollen, little change in the last 5 days. 2/6: Status quo. Somnolent but arousable. Desats and dyspneic without AVAPS. Remains massively edematous    2/7:  resp status is status quo. More awake, alert, interactive today. Resumed bumex gtt yesterday. 2/8 for dialysis line in am   Alert oriented   Remain markedly hypervolemic     2/9: Sitting up in bed, on Bipap. Family is present at bedside. Markedly edematous. She is asking me if [address] placement will hurt. She reports dyspnea, but feels better on Bipap. She denies chest pain or palpitations. Plan is for [address] catheter placement with IR today. 2/10: Seen while on HD. Tolerating without complications. Tells me she is feeling much better. Still edematous, but edema has improved substantially. She is on nasal canula, eating breakfast. She had 2.7 L removed yesterday and 3L removed today. 2/11: Dialysis today notable for hypotension and only 2.1 L UF. Currently asleep, denies new complaint. Ongoing fatigue, malaise, generalized weakness. 2/12: Somewhat less anxious than she had been over the past several days. Did tolerate roughly 3 hours off of CPAP consecutively yesterday. Still mostly using CPAP throughout the day. Swelling better. 2/13: Seen while in dialysis. Tolerating ultrafiltration without complications. Plan is to remove 3L today. Blood pressure is stable. She is on nasal canula. She tells me that she goes back and forth between nasal canula and CPAP. Plan is for a right sided pleurix catheter to be placed later today.      2/14: Seen while in dialysis. Tolerating treatment without complication. Blood pressure is stable. She is on nasal canula. Tells me she continues to feel better each day. Still with significant abdominal swelling. 2/15:  Laying in bed on BiPap. She has mild shortness of breath. BP remains low. She denies chest pain. Appetite is fair. PROBLEM LIST:    Principal Problem:    Acute on chronic respiratory failure with hypoxia and hypercapnia (HCC)  Active Problems:    Palliative care encounter  Resolved Problems:    * No resolved hospital problems. *         DIET:    ADULT DIET; Regular; 3 carb choices (45 gm/meal); Low Fat/Low Chol/High Fiber/2 gm Na; Moderately Thick (Honey); 1200 ml  ADULT ORAL NUTRITION SUPPLEMENT; Lunch, Dinner;  Other Oral Supplement; Gelatein 20     MEDS (scheduled):    midodrine  10 mg Oral TID     heparin (porcine)  5,000 Units SubCUTAneous 3 times per day    metoprolol succinate  25 mg Oral Daily    digoxin  125 mcg IntraVENous Daily    sucralfate  1 g Oral 4x Daily    rOPINIRole  1 mg Oral TID    atorvastatin  20 mg Oral Nightly    ipratropium-albuterol  1 vial Inhalation 4x Daily    [Held by provider] insulin glargine  13 Units SubCUTAneous BID    [Held by provider] LORazepam  0.5 mg Oral Nightly    insulin lispro  0-4 Units SubCUTAneous TID     insulin lispro  0-4 Units SubCUTAneous Nightly    budesonide  0.5 mg Nebulization BID    sodium chloride flush  10 mL IntraVENous 2 times per day    miconazole   Topical BID    arformoterol tartrate  15 mcg Nebulization BID    pantoprazole  40 mg Oral QAM AC    [Held by provider] apixaban  5 mg Oral BID    mirtazapine  7.5 mg Oral Nightly    carbidopa-levodopa  1 tablet Oral TID    sertraline  50 mg Oral Daily    montelukast  10 mg Oral Nightly       MEDS (infusions):   sodium chloride 500 mL/hr at 02/15/23 0828    sodium chloride 25 mL/hr at 02/04/23 0222    dextrose         MEDS (prn):  sodium chloride, loperamide, sodium chloride flush, sodium chloride, promethazine **OR** ondansetron, polyethylene glycol, acetaminophen **OR** acetaminophen, glucose, dextrose bolus **OR** dextrose bolus, glucagon (rDNA), dextrose    PHYSICAL EXAM:     Patient Vitals for the past 24 hrs:   BP Temp Temp src Pulse Resp SpO2   02/15/23 1016 -- -- -- -- 21 --   02/15/23 1000 (!) 91/43 -- -- 63 -- 96 %   02/15/23 0800 -- -- -- -- -- (!) 85 %   02/15/23 0737 (!) 82/32 -- -- -- -- 90 %   02/15/23 0730 (!) 64/32 (!) 96.1 °F (35.6 °C) Axillary 58 16 --   02/15/23 0614 (!) 92/50 -- -- 52 -- (!) 89 %   02/15/23 0135 -- -- -- -- 20 --   02/15/23 0101 (!) 92/45 -- -- -- -- --   02/15/23 0040 (!) 85/50 -- -- 58 -- --   02/14/23 2229 -- -- -- -- 20 --   02/14/23 2118 (!) 85/42 -- -- -- -- --   02/14/23 1930 (!) 83/53 98.1 °F (36.7 °C) Oral 52 20 91 %   02/14/23 1657 (!) 80/49 97.9 °F (36.6 °C) Oral 56 19 90 %   02/14/23 1341 -- -- -- 91 -- 90 %   02/14/23 1310 (!) 117/59 98.1 °F (36.7 °C) Oral 91 22 90 %     @      Intake/Output Summary (Last 24 hours) at 2/15/2023 1212  Last data filed at 2/14/2023 2229  Gross per 24 hour   Intake 200 ml   Output --   Net 200 ml           Wt Readings from Last 3 Encounters:   02/14/23 219 lb 9.3 oz (99.6 kg)   01/16/23 259 lb (117.5 kg)   01/16/23 259 lb (117.5 kg)     Age-appropriate morbidly obese white woman, though easily arousable, no apparent distress  NC/AT EOMI sclera conjunctive are clear and anicteric mucous membranes are moist  Neck soft supple trachea midline  Lungs:  Diminished bilaterally. Poor inspiratory effort. Regular rhythm normal S1 and S2  Abdomen soft nontender nondistended normal active bowel sounds. Abdominal pannus has substantial edema  Distal extremities, thighs, sacrum have substantial chronic type nonpitting edema, +1/trace BLE edema.    Pulses 1+ x4  Moves all 4 extremities  Cranial nerves II through XII grossly intact  Awake, alert, interactive and appropriate  Skin tone consistent with chronic venous stasis distally      DATA:    Recent Labs     02/13/23  1427   WBC 6.8   HGB 8.2*   HCT 31.9*   MCV 96.1              Recent Labs     02/13/23  1427 02/14/23  0730 02/15/23  0848    139 137   K 3.8 3.7 4.1   CL 99 99 98   CO2 33* 32* 28   MG 1.6  --  1.6   PHOS 2.7  --   --    BUN 20 20 27*   CREATININE 2.4* 2.7* 3.2*   ALT <5 <5 <5   AST 7 10 <5   BILITOT 0.7 0.6 0.8   ALKPHOS 55 53 48         Lab Results   Component Value Date    LABPROT 0.5 (H) 01/18/2023    LABPROT 0.5 01/18/2023     On CT no hydro/signs of obstruction     Urine studies  U Na 21, U Cl 23 U prot:cr ratio 0.5    ASSESSMENT / RECOMMENDATIONS:       Assessment  1. Acute kidney injury urine lytes support decrease effective volume     2. CKD stage III, Baseline creatinine 1.4 to 1.7 mg/dL, secondary to diabetic nephropathy and renal microvascular atherosclerotic disease. 0.5 g proteinuria     3. Anemia due to CKD, phlebotomy associated prolonged hospitalization     4. Acute hypercapnic and hypoxic respiratory failure due to COPD exacerbation and pneumonia. Does not appear hypervolemic. 5.  Morbid obesity, BMI 50.6 kg per metered square     6. Edema/anasarca, chronic, multifactorial, due to venous insufficiency in the setting of morbid obesity, relative immobility, likely diastolic dysfunction though it was \"indeterminate\" on echo, the does have pulmonary hypertension, mild right-sided heart failure    7. Hypotension    8. Elevated bicarbonate represents compensation for chronic respiratory acidosis, as well as likely contraction alkalosis due to diuresis on the Bumex drip     Remains hypervolemic, particular in her abdominal pannus extremities much relieved  Chest xray-large right pleural effusion   Tolerating hemodialysis without complications. Edema has improved substantially.      2.7 L removed 2/9  3 L removed 2/10  2.1 L removed 2/11 2/13 -removed 2800  2/14- removed 3200  Right Pleurix catheter in place  BP remains low 91/43- received albumin and continues on midodrine  Mg 1.6    Recommendations   -Hold off on dialysis today given her hypotension and removal of 11,000 L (in combination with HD and diuretics) and give her a chance to re-equilibrate   -Evaluate and likely resume tx tomorrow contingent on clinical status  -Mag Oxide po 400 mg daily  -Monitor labs  -Monitor I & O  Continue supportive care      Electronically signed by Adaline Kussmaul, APRN - BRADLEY on 2/15/2023

## 2023-02-15 NOTE — PROGRESS NOTES
Attempted tx with pt, however pt is not appropriate for therapy on this date per nsg (Nadeen); pt is bradycardic on this date. Will attempt tx with pt at later time/date when pt is appropriate.  Jaquan Coto, [address]

## 2023-02-15 NOTE — CARE COORDINATION
SS NOTE: COVID NEGATIVE 1/17, PT WILL NEED A RAPID NEGATIVE COVID TEST IF SHE RETURNS TO [facility]. Pt is on 7 liters of O2 and bipap at  night. Pt recd a tunneled HD catheter and HD initiated last week. She has had approx 5 HD tx thus far here. Pt has a peripheral line. Pt had a pleurex catheter placed yesterday. She has a genao catheter. Critical Care, Cardiology, ID, PT/OT and Palliative care are all involved. DR Vaughn Figueredo signed the Appeal Letter yesterday and awaiting decision from insurance for [facility]. [facility] is also still following pt- the pt is out of medicaid bedhold days- however they would accept her for return [facility] once she would be stable for their level of care. KRISHNA spoke with pt's dtr [address] [facility], and updated her on the above information. SS to continue. ESTHER Trejo.2/15/2023.11:45AM.

## 2023-02-16 ENCOUNTER — APPOINTMENT (OUTPATIENT)
Dept: GENERAL RADIOLOGY | Age: 73
End: 2023-02-16
Payer: MEDICARE

## 2023-02-16 LAB
ALBUMIN SERPL-MCNC: 3.7 G/DL (ref 3.5–5.2)
ALP BLD-CCNC: 50 U/L (ref 35–104)
ALT SERPL-CCNC: <5 U/L (ref 0–32)
ANION GAP SERPL CALCULATED.3IONS-SCNC: 9 MMOL/L (ref 7–16)
ANISOCYTOSIS: ABNORMAL
AST SERPL-CCNC: 7 U/L (ref 0–31)
BASOPHILIC STIPPLING: ABNORMAL
BASOPHILS ABSOLUTE: 0.05 E9/L (ref 0–0.2)
BASOPHILS RELATIVE PERCENT: 0.7 % (ref 0–2)
BILIRUB SERPL-MCNC: 0.6 MG/DL (ref 0–1.2)
BUN BLDV-MCNC: 31 MG/DL (ref 6–23)
CALCIUM SERPL-MCNC: 8.4 MG/DL (ref 8.6–10.2)
CHLORIDE BLD-SCNC: 98 MMOL/L (ref 98–107)
CO2: 29 MMOL/L (ref 22–29)
CREAT SERPL-MCNC: 3.5 MG/DL (ref 0.5–1)
EOSINOPHILS ABSOLUTE: 0.2 E9/L (ref 0.05–0.5)
EOSINOPHILS RELATIVE PERCENT: 2.7 % (ref 0–6)
GFR SERPL CREATININE-BSD FRML MDRD: 13 ML/MIN/1.73
GLUCOSE BLD-MCNC: 87 MG/DL (ref 74–99)
HCT VFR BLD CALC: 27.4 % (ref 34–48)
HEMOGLOBIN: 7.7 G/DL (ref 11.5–15.5)
IMMATURE GRANULOCYTES #: 0.22 E9/L
IMMATURE GRANULOCYTES %: 3 % (ref 0–5)
LYMPHOCYTES ABSOLUTE: 2.02 E9/L (ref 1.5–4)
LYMPHOCYTES RELATIVE PERCENT: 27.7 % (ref 20–42)
MCH RBC QN AUTO: 25.2 PG (ref 26–35)
MCHC RBC AUTO-ENTMCNC: 28.1 % (ref 32–34.5)
MCV RBC AUTO: 89.8 FL (ref 80–99.9)
METER GLUCOSE: 107 MG/DL (ref 74–99)
METER GLUCOSE: 126 MG/DL (ref 74–99)
METER GLUCOSE: 81 MG/DL (ref 74–99)
METER GLUCOSE: 91 MG/DL (ref 74–99)
MONOCYTES ABSOLUTE: 0.52 E9/L (ref 0.1–0.95)
MONOCYTES RELATIVE PERCENT: 7.1 % (ref 2–12)
NEUTROPHILS ABSOLUTE: 4.29 E9/L (ref 1.8–7.3)
NEUTROPHILS RELATIVE PERCENT: 58.8 % (ref 43–80)
OVALOCYTES: ABNORMAL
PDW BLD-RTO: 20.5 FL (ref 11.5–15)
PHOSPHORUS: 1.1 MG/DL (ref 2.5–4.5)
PLATELET # BLD: 136 E9/L (ref 130–450)
PMV BLD AUTO: 11.7 FL (ref 7–12)
POIKILOCYTES: ABNORMAL
POLYCHROMASIA: ABNORMAL
POTASSIUM SERPL-SCNC: 3.7 MMOL/L (ref 3.5–5)
PROCALCITONIN: 0.89 NG/ML (ref 0–0.08)
RBC # BLD: 3.05 E12/L (ref 3.5–5.5)
SODIUM BLD-SCNC: 136 MMOL/L (ref 132–146)
TEAR DROP CELLS: ABNORMAL
TOTAL PROTEIN: 5.3 G/DL (ref 6.4–8.3)
WBC # BLD: 7.3 E9/L (ref 4.5–11.5)

## 2023-02-16 PROCEDURE — 6360000002 HC RX W HCPCS: Performed by: FAMILY MEDICINE

## 2023-02-16 PROCEDURE — 97530 THERAPEUTIC ACTIVITIES: CPT | Performed by: PHYSICAL THERAPIST

## 2023-02-16 PROCEDURE — 84145 PROCALCITONIN (PCT): CPT

## 2023-02-16 PROCEDURE — 2580000003 HC RX 258: Performed by: INTERNAL MEDICINE

## 2023-02-16 PROCEDURE — 2500000003 HC RX 250 WO HCPCS: Performed by: FAMILY MEDICINE

## 2023-02-16 PROCEDURE — 36415 COLL VENOUS BLD VENIPUNCTURE: CPT

## 2023-02-16 PROCEDURE — 6370000000 HC RX 637 (ALT 250 FOR IP): Performed by: INTERNAL MEDICINE

## 2023-02-16 PROCEDURE — 2580000003 HC RX 258: Performed by: FAMILY MEDICINE

## 2023-02-16 PROCEDURE — 94660 CPAP INITIATION&MGMT: CPT

## 2023-02-16 PROCEDURE — 2060000000 HC ICU INTERMEDIATE R&B

## 2023-02-16 PROCEDURE — 6360000002 HC RX W HCPCS: Performed by: NURSE PRACTITIONER

## 2023-02-16 PROCEDURE — 6360000002 HC RX W HCPCS: Performed by: INTERNAL MEDICINE

## 2023-02-16 PROCEDURE — 99232 SBSQ HOSP IP/OBS MODERATE 35: CPT | Performed by: INTERNAL MEDICINE

## 2023-02-16 PROCEDURE — 71045 X-RAY EXAM CHEST 1 VIEW: CPT

## 2023-02-16 PROCEDURE — 6370000000 HC RX 637 (ALT 250 FOR IP): Performed by: NURSE PRACTITIONER

## 2023-02-16 PROCEDURE — 80053 COMPREHEN METABOLIC PANEL: CPT

## 2023-02-16 PROCEDURE — 2580000003 HC RX 258

## 2023-02-16 PROCEDURE — 85025 COMPLETE CBC W/AUTO DIFF WBC: CPT

## 2023-02-16 PROCEDURE — 94640 AIRWAY INHALATION TREATMENT: CPT

## 2023-02-16 PROCEDURE — 84100 ASSAY OF PHOSPHORUS: CPT

## 2023-02-16 PROCEDURE — 2700000000 HC OXYGEN THERAPY PER DAY

## 2023-02-16 PROCEDURE — 90935 HEMODIALYSIS ONE EVALUATION: CPT

## 2023-02-16 PROCEDURE — 97110 THERAPEUTIC EXERCISES: CPT | Performed by: PHYSICAL THERAPIST

## 2023-02-16 PROCEDURE — 6360000002 HC RX W HCPCS

## 2023-02-16 PROCEDURE — 82962 GLUCOSE BLOOD TEST: CPT

## 2023-02-16 RX ADMIN — MAGNESIUM OXIDE 400 MG: 400 TABLET ORAL at 13:26

## 2023-02-16 RX ADMIN — SODIUM PHOSPHATE, MONOBASIC, MONOHYDRATE AND SODIUM PHOSPHATE, DIBASIC, ANHYDROUS 20 MMOL: 142; 276 INJECTION, SOLUTION INTRAVENOUS at 21:43

## 2023-02-16 RX ADMIN — BUDESONIDE 500 MCG: 0.5 SUSPENSION RESPIRATORY (INHALATION) at 06:32

## 2023-02-16 RX ADMIN — CARBIDOPA AND LEVODOPA 1 TABLET: 50; 200 TABLET, EXTENDED RELEASE ORAL at 13:27

## 2023-02-16 RX ADMIN — HEPARIN SODIUM 5000 UNITS: 5000 INJECTION INTRAVENOUS; SUBCUTANEOUS at 06:16

## 2023-02-16 RX ADMIN — HEPARIN SODIUM 5000 UNITS: 5000 INJECTION INTRAVENOUS; SUBCUTANEOUS at 13:38

## 2023-02-16 RX ADMIN — PANTOPRAZOLE SODIUM 40 MG: 40 TABLET, DELAYED RELEASE ORAL at 06:16

## 2023-02-16 RX ADMIN — IPRATROPIUM BROMIDE AND ALBUTEROL SULFATE 3 ML: .5; 2.5 SOLUTION RESPIRATORY (INHALATION) at 18:36

## 2023-02-16 RX ADMIN — Medication 10 ML: at 21:34

## 2023-02-16 RX ADMIN — SUCRALFATE 1 G: 1 TABLET ORAL at 16:26

## 2023-02-16 RX ADMIN — DIGOXIN 125 MCG: 0.25 INJECTION INTRAMUSCULAR; INTRAVENOUS at 13:39

## 2023-02-16 RX ADMIN — IPRATROPIUM BROMIDE AND ALBUTEROL SULFATE 3 ML: .5; 2.5 SOLUTION RESPIRATORY (INHALATION) at 13:21

## 2023-02-16 RX ADMIN — ARFORMOTEROL TARTRATE 15 MCG: 15 SOLUTION RESPIRATORY (INHALATION) at 18:36

## 2023-02-16 RX ADMIN — MIDODRINE HYDROCHLORIDE 10 MG: 5 TABLET ORAL at 16:26

## 2023-02-16 RX ADMIN — MIDODRINE HYDROCHLORIDE 10 MG: 5 TABLET ORAL at 13:27

## 2023-02-16 RX ADMIN — ACETAMINOPHEN 650 MG: 325 TABLET ORAL at 13:27

## 2023-02-16 RX ADMIN — SUCRALFATE 1 G: 1 TABLET ORAL at 13:29

## 2023-02-16 RX ADMIN — ARFORMOTEROL TARTRATE 15 MCG: 15 SOLUTION RESPIRATORY (INHALATION) at 06:33

## 2023-02-16 RX ADMIN — MIRTAZAPINE 7.5 MG: 15 TABLET, FILM COATED ORAL at 21:32

## 2023-02-16 RX ADMIN — ATORVASTATIN CALCIUM 20 MG: 20 TABLET, FILM COATED ORAL at 21:32

## 2023-02-16 RX ADMIN — ANTI-FUNGAL POWDER MICONAZOLE NITRATE TALC FREE: 1.42 POWDER TOPICAL at 21:33

## 2023-02-16 RX ADMIN — SUCRALFATE 1 G: 1 TABLET ORAL at 21:32

## 2023-02-16 RX ADMIN — BUDESONIDE 500 MCG: 0.5 SUSPENSION RESPIRATORY (INHALATION) at 18:36

## 2023-02-16 RX ADMIN — CARBIDOPA AND LEVODOPA 1 TABLET: 50; 200 TABLET, EXTENDED RELEASE ORAL at 21:32

## 2023-02-16 RX ADMIN — Medication 10 ML: at 13:28

## 2023-02-16 RX ADMIN — MONTELUKAST SODIUM 10 MG: 10 TABLET, FILM COATED ORAL at 21:33

## 2023-02-16 RX ADMIN — ROPINIROLE HYDROCHLORIDE 1 MG: 1 TABLET, FILM COATED ORAL at 13:27

## 2023-02-16 RX ADMIN — HEPARIN SODIUM 5000 UNITS: 5000 INJECTION INTRAVENOUS; SUBCUTANEOUS at 21:33

## 2023-02-16 RX ADMIN — CARBIDOPA AND LEVODOPA 1 TABLET: 50; 200 TABLET, EXTENDED RELEASE ORAL at 16:26

## 2023-02-16 RX ADMIN — SODIUM CHLORIDE 125 MG: 9 INJECTION, SOLUTION INTRAVENOUS at 17:22

## 2023-02-16 RX ADMIN — ROPINIROLE HYDROCHLORIDE 1 MG: 1 TABLET, FILM COATED ORAL at 21:33

## 2023-02-16 RX ADMIN — ANTI-FUNGAL POWDER MICONAZOLE NITRATE TALC FREE: 1.42 POWDER TOPICAL at 13:28

## 2023-02-16 RX ADMIN — SERTRALINE 50 MG: 50 TABLET, FILM COATED ORAL at 13:27

## 2023-02-16 RX ADMIN — IPRATROPIUM BROMIDE AND ALBUTEROL SULFATE 3 ML: .5; 2.5 SOLUTION RESPIRATORY (INHALATION) at 06:32

## 2023-02-16 RX ADMIN — MIDODRINE HYDROCHLORIDE 10 MG: 5 TABLET ORAL at 06:16

## 2023-02-16 ASSESSMENT — PAIN SCALES - GENERAL
PAINLEVEL_OUTOF10: 0
PAINLEVEL_OUTOF10: 3

## 2023-02-16 ASSESSMENT — PAIN DESCRIPTION - LOCATION: LOCATION: GENERALIZED

## 2023-02-16 NOTE — PROGRESS NOTES
[facility] MD Hoang Orozco MD Marcello Burton, MD Alanna Ou, BRADLEY Devlin, NUNU Lobato, BRADLEY  Progress Note    2/16/2023    SUBJECTIVE:   1/20: Feeling little better today. Denies new complaint. Still tachypnea, though denies dyspnea no cp/palp Appetite ok ROS otherwise unremarkable    1//21 seen in her room on o2/nc bp stable weak poor po intake   2+ edema in LE     1/22 charts reviewed . On bipap    1/23 : on o2/nc . Still look hypervolemic     1/24 seen in her room comfortable o2/nc . Still with LE edema which seems to be multifactorial and will likely need to accept having some edema on board     1/25 sitting on edge of the bed working with pt . Still with considerable edema in LEs   BP stable     1/26 seen in her room reports of SOB with minimal activity , LE edema still signficant . 1/27 good UO On 5 L/o2/nc  Still with sob with activity Tachycardiac with low functional capacity     1/28: Asleep, resting and breathing comfortably on nasal cannula. Remains edematous. Ongoing fatigue and generalized weakness. 1/29: Hypotensive last evening, Bumex drip stopped, IV normal saline bolus administered to effect. Breathing little more easily though still on AVAPS. To be downgraded to CPAP shortly. Thirsty. Still markedly edematous. 1/30: Somnolent, though arousable. On CPAP. Ongoing fatigue and malaise with generalized weakness    1/31 seen in her room , skin wrinkling in LE on HFNC . BP on lower side  For thoracentesis today   Dw RN .     2/1: Seen while laying in bed, no acute distress. Tells me that she is thirsty. She is on heated high flow nasal canula. Feels that her breathing has improved slightly. Plan is for thoracentesis later this afternoon, could not be done yesterday due to elevated INR. Still with skin wrinkling to bilateral lower extremities.      2/2 1 L removed with thoracentesis   O2 requirement better on 8 L o2/nc Very weak and deconditioned   Labile BP numbers    2/2 o2/nc LE UO ok remain weak decondtioned . Edema in LE getting somewhat worse    2/4 remain with significant depdent edema deconditioned in bed     2/5: Discussed with respiratory therapist: Just put on her CPAP after having tolerated nasal cannula throughout most of the day. Remains bedbound, severely weak and debilitated. Remains massively swollen, little change in the last 5 days. 2/6: Status quo. Somnolent but arousable. Desats and dyspneic without AVAPS. Remains massively edematous    2/7:  resp status is status quo. More awake, alert, interactive today. Resumed bumex gtt yesterday. 2/8 for dialysis line in am   Alert oriented   Remain markedly hypervolemic     2/9: Sitting up in bed, on Bipap. Family is present at bedside. Markedly edematous. She is asking me if [facility] placement will hurt. She reports dyspnea, but feels better on Bipap. She denies chest pain or palpitations. Plan is for [facility] catheter placement with IR today. 2/10: Seen while on HD. Tolerating without complications. Tells me she is feeling much better. Still edematous, but edema has improved substantially. She is on nasal canula, eating breakfast. She had 2.7 L removed yesterday and 3L removed today. 2/11: Dialysis today notable for hypotension and only 2.1 L UF. Currently asleep, denies new complaint. Ongoing fatigue, malaise, generalized weakness. 2/12: Somewhat less anxious than she had been over the past several days. Did tolerate roughly 3 hours off of CPAP consecutively yesterday. Still mostly using CPAP throughout the day. Swelling better. 2/13: Seen while in dialysis. Tolerating ultrafiltration without complications. Plan is to remove 3L today. Blood pressure is stable. She is on nasal canula. She tells me that she goes back and forth between nasal canula and CPAP. Plan is for a right sided pleurix catheter to be placed later today.      2/14: Seen while in dialysis. Tolerating treatment without complication. Blood pressure is stable. She is on nasal canula. Tells me she continues to feel better each day. Still with significant abdominal swelling. 2/15:  Laying in bed on BiPap. She has mild shortness of breath. BP remains low. She denies chest pain. Appetite is fair. 2/16: Seen while on hemodialysis. Tolerating treatment without complication. Blood pressure has improved and is stable. She denies dizziness, chest pain, dyspnea, or palpitations. Tells me she is tolerating being off the BIPAP for longer periods. Appetite is good. PROBLEM LIST:    Principal Problem:    Acute on chronic respiratory failure with hypoxia and hypercapnia (HCC)  Active Problems:    Palliative care encounter  Resolved Problems:    * No resolved hospital problems. *         DIET:    ADULT DIET; Regular; 3 carb choices (45 gm/meal); Low Fat/Low Chol/High Fiber/2 gm Na; Moderately Thick (Honey); 1200 ml  ADULT ORAL NUTRITION SUPPLEMENT; Lunch, Dinner;  Other Oral Supplement; Gelatein 20     MEDS (scheduled):    magnesium oxide  400 mg Oral Daily    midodrine  10 mg Oral TID     heparin (porcine)  5,000 Units SubCUTAneous 3 times per day    metoprolol succinate  25 mg Oral Daily    digoxin  125 mcg IntraVENous Daily    sucralfate  1 g Oral 4x Daily    rOPINIRole  1 mg Oral TID    atorvastatin  20 mg Oral Nightly    ipratropium-albuterol  1 vial Inhalation 4x Daily    [Held by provider] insulin glargine  13 Units SubCUTAneous BID    [Held by provider] LORazepam  0.5 mg Oral Nightly    insulin lispro  0-4 Units SubCUTAneous TID     insulin lispro  0-4 Units SubCUTAneous Nightly    budesonide  0.5 mg Nebulization BID    sodium chloride flush  10 mL IntraVENous 2 times per day    miconazole   Topical BID    arformoterol tartrate  15 mcg Nebulization BID    pantoprazole  40 mg Oral QAM AC    [Held by provider] apixaban  5 mg Oral BID    mirtazapine  7.5 mg Oral Nightly carbidopa-levodopa  1 tablet Oral TID    sertraline  50 mg Oral Daily    montelukast  10 mg Oral Nightly       MEDS (infusions):   sodium chloride 25 mL/hr at 02/04/23 0222    dextrose         MEDS (prn):  sodium chloride, loperamide, sodium chloride flush, sodium chloride, promethazine **OR** ondansetron, polyethylene glycol, acetaminophen **OR** acetaminophen, glucose, dextrose bolus **OR** dextrose bolus, glucagon (rDNA), dextrose    PHYSICAL EXAM:     Patient Vitals for the past 24 hrs:   BP Temp Temp src Pulse Resp SpO2 Weight   02/16/23 0930 (!) 132/52 -- -- 62 -- -- --   02/16/23 0900 (!) 111/58 -- -- 60 -- -- --   02/16/23 0825 (!) 117/58 99.2 °F (37.3 °C) -- 58 22 -- 222 lb 14.2 oz (101.1 kg)   02/16/23 0752 -- 98.2 °F (36.8 °C) Axillary 65 28 96 % --   02/16/23 0633 -- -- -- -- 27 -- --   02/16/23 0632 -- -- -- -- -- 94 % --   02/16/23 0613 (!) 97/59 -- -- -- 20 -- --   02/15/23 2300 (!) 108/48 98.1 °F (36.7 °C) Axillary 67 24 91 % --   02/15/23 2223 -- -- -- -- 24 -- --   02/15/23 1815 (!) 138/58 -- -- 60 20 (!) 88 % --   02/15/23 1530 -- -- -- -- -- 90 % --   02/15/23 1525 (!) 111/54 98.1 °F (36.7 °C) Oral 54 20 -- --   02/15/23 1245 (!) 89/54 -- -- 60 -- 91 % --   02/15/23 1016 -- -- -- -- 21 -- --   02/15/23 1000 (!) 91/43 -- -- 63 -- 96 % --     @      Intake/Output Summary (Last 24 hours) at 2/16/2023 0941  Last data filed at 2/15/2023 1815  Gross per 24 hour   Intake 1200 ml   Output --   Net 1200 ml           Wt Readings from Last 3 Encounters:   02/16/23 222 lb 14.2 oz (101.1 kg)   01/16/23 259 lb (117.5 kg)   01/16/23 259 lb (117.5 kg)     Age-appropriate morbidly obese white woman, though easily arousable, no apparent distress  NC/AT EOMI sclera conjunctive are clear and anicteric mucous membranes are moist  Neck soft supple trachea midline  Lungs:  Diminished bilaterally. Poor inspiratory effort. Regular rhythm normal S1 and S2  Abdomen soft nontender nondistended normal active bowel sounds. Abdominal pannus has substantial edema (+3 pitting)  Distal extremities, thighs, sacrum have substantial chronic type nonpitting edema, +1/trace BLE edema. Pulses 1+ x4  Moves all 4 extremities  Cranial nerves II through XII grossly intact  Awake, alert, interactive and appropriate  Skin tone consistent with chronic venous stasis distally      DATA:    Recent Labs     02/13/23  1427 02/16/23  0743   WBC 6.8 7.3   HGB 8.2* 7.7*   HCT 31.9* 27.4*   MCV 96.1 89.8    136           Recent Labs     02/13/23  1427 02/14/23  0730 02/15/23  0848 02/16/23  0743    139 137 136   K 3.8 3.7 4.1 3.7   CL 99 99 98 98   CO2 33* 32* 28 29   MG 1.6  --  1.6  --    PHOS 2.7  --   --   --    BUN 20 20 27* 31*   CREATININE 2.4* 2.7* 3.2* 3.5*   ALT <5 <5 <5 <5   AST 7 10 <5 7   BILITOT 0.7 0.6 0.8 0.6   ALKPHOS 55 53 48 50         Lab Results   Component Value Date    LABPROT 0.5 (H) 01/18/2023    LABPROT 0.5 01/18/2023     On CT no hydro/signs of obstruction     Urine studies  U Na 21, U Cl 23 U prot:cr ratio 0.5    ASSESSMENT / RECOMMENDATIONS:       Assessment  1. Acute kidney injury urine lytes support decrease effective volume     2. CKD stage III, Baseline creatinine 1.4 to 1.7 mg/dL, secondary to diabetic nephropathy and renal microvascular atherosclerotic disease. 0.5 g proteinuria     3. Anemia due to CKD, phlebotomy associated prolonged hospitalization     4. Acute hypercapnic and hypoxic respiratory failure due to COPD exacerbation and pneumonia. Does not appear hypervolemic. 5.  Morbid obesity, BMI 50.6 kg per metered square     6. Edema/anasarca, chronic, multifactorial, due to venous insufficiency in the setting of morbid obesity, relative immobility, likely diastolic dysfunction though it was \"indeterminate\" on echo, the does have pulmonary hypertension, mild right-sided heart failure    7. Hypotension    8.   Elevated bicarbonate represents compensation for chronic respiratory acidosis, as well as likely contraction alkalosis due to diuresis on the Bumex drip     Remains hypervolemic, particular in her abdominal pannus extremities much relieved  Chest xray-large right pleural effusion   Tolerating hemodialysis without complications. Edema has improved substantially.      2.7 L removed 2/9  3 L removed 2/10  2.1 L removed 2/11 2/13 -removed 2800  2/14- removed 3200  Right Pleurix catheter in place  BP improved   Iron-33  Iron sat-12   Ferritin-60  Hemoglobin-7.7     Recommendations  -Hemodialysis today for solute and fluid removal as tolerated   -Hemodialysis versus ultrafiltration tomorrow depending on am labs and blood pressure   -Start IV Ferrlecit 125 mg x4 doses   -Mag Oxide po 400 mg daily  -Continue Midodrine 10 mg TID   -Monitor labs  -Monitor I & O  Continue supportive care      Electronically signed by RASHARD Mustafa - CNP on 2/16/2023

## 2023-02-16 NOTE — PROGRESS NOTES
Occupational Therapy  OT SESSION ATTEMPT     Date:2023  Patient Name: Shine Holland  MRN: [id number]  : 1950  Room: [address] [place] [address]     Attempted OT session this date:    [] unavailable due to other medical staff currently with pt   [] unavailable per nursing staff recommendation    [] Unavailable per nursing staff secondary to lab / radiology results    [] pt declined due to ____. Benefits of participation in therapy reviewed with pt. [x] off unit-HD   [] Other:     Will reattempt OT evaluation and/or treatment at a later time.     Julio Cesar Perez OTR/L; [id number]

## 2023-02-16 NOTE — CARE COORDINATION
SS NOTE: COVID NEGATIVE 1/17, PT WILL NEED A RAPID NEGATIVE COVID TEST IF SHE [place] [facility] SW recd contact from Liaison of [facility] that pt's denial upheld from [facility] for admission to [facility]. Pt is on 10 liters high flow O2 and bipap at FIO2 at 50. Pt recd a tunneled HD catheter and HD initiated last week. She has had approx 6 HD tx thus far here. Pt has a peripheral line. Pt had a pleurex catheter placed. She has a genao catheter. Critical Care, Cardiology, ID, PT/OT and Palliative care have been on. [facility] continues to follow, pt is out of medicaid bedhold days- however they would accept her for return skilled once she would be stable for their level of care. They would provide transportation to HD and would be able care for the pleurex catheter. SS to continue. ESTHER Muñoz.2/15/2023.11:45AM.

## 2023-02-16 NOTE — PROGRESS NOTES
Physical Therapy  Physical Therapy    Room #:   5078/5475-30    Date: 2023       Patient Name: Donell Paredes  :       MRN: [id number]     Patient unavailable for physical therapy treatment due to off floor at dialysis     Lulu Boss, PT

## 2023-02-16 NOTE — PROGRESS NOTES
Department of Internal Medicine  General Internal Medicine  Attending Progress Note  Chief Complaint   Patient presents with    Respiratory Distress     Normally on 3L NC, came in on CPAP, given total of 50mcg push dose epi for low BP by EMS     No complaints.    hd  OBJECTIVE      Medications    Current Facility-Administered Medications: ferric gluconate (FERRLECIT) 125 mg in sodium chloride 0.9 % 100 mL IVPB, 125 mg, IntraVENous, Daily  magnesium oxide (MAG-OX) tablet 400 mg, 400 mg, Oral, Daily  midodrine (PROAMATINE) tablet 10 mg, 10 mg, Oral, TID WC  heparin (porcine) injection 5,000 Units, 5,000 Units, SubCUTAneous, 3 times per day  metoprolol succinate (TOPROL XL) extended release tablet 25 mg, 25 mg, Oral, Daily  digoxin (LANOXIN) injection 125 mcg, 125 mcg, IntraVENous, Daily  sodium chloride (OCEAN, BABY AYR) 0.65 % nasal spray 1 spray, 1 spray, Each Nostril, PRN  sucralfate (CARAFATE) tablet 1 g, 1 g, Oral, 4x Daily  rOPINIRole (REQUIP) tablet 1 mg, 1 mg, Oral, TID  atorvastatin (LIPITOR) tablet 20 mg, 20 mg, Oral, Nightly  ipratropium-albuterol (DUONEB) nebulizer solution 3 mL, 1 vial, Inhalation, 4x Daily  [Held by provider] insulin glargine (LANTUS) injection vial 13 Units, 13 Units, SubCUTAneous, BID  loperamide (IMODIUM) capsule 2 mg, 2 mg, Oral, 4x Daily PRN  [Held by provider] LORazepam (ATIVAN) tablet 0.5 mg, 0.5 mg, Oral, Nightly  insulin lispro (HUMALOG) injection vial 0-4 Units, 0-4 Units, SubCUTAneous, TID WC  insulin lispro (HUMALOG) injection vial 0-4 Units, 0-4 Units, SubCUTAneous, Nightly  budesonide (PULMICORT) nebulizer suspension 500 mcg, 0.5 mg, Nebulization, BID  sodium chloride flush 0.9 % injection 10 mL, 10 mL, IntraVENous, 2 times per day  sodium chloride flush 0.9 % injection 10 mL, 10 mL, IntraVENous, PRN  0.9 % sodium chloride infusion, , IntraVENous, PRN  promethazine (PHENERGAN) tablet 12.5 mg, 12.5 mg, Oral, Q6H PRN **OR** ondansetron (ZOFRAN) injection 4 mg, 4 mg, IntraVENous, Q6H PRN  polyethylene glycol (GLYCOLAX) packet 17 g, 17 g, Oral, Daily PRN  acetaminophen (TYLENOL) tablet 650 mg, 650 mg, Oral, Q6H PRN **OR** acetaminophen (TYLENOL) suppository 650 mg, 650 mg, Rectal, Q6H PRN  miconazole (MICOTIN) 2 % powder, , Topical, BID  arformoterol tartrate (BROVANA) nebulizer solution 15 mcg, 15 mcg, Nebulization, BID  pantoprazole (PROTONIX) tablet 40 mg, 40 mg, Oral, QAM AC  glucose chewable tablet 16 g, 4 tablet, Oral, PRN  dextrose bolus 10% 125 mL, 125 mL, IntraVENous, PRN **OR** dextrose bolus 10% 250 mL, 250 mL, IntraVENous, PRN  glucagon (rDNA) injection 1 mg, 1 mg, SubCUTAneous, PRN  dextrose 10 % infusion, , IntraVENous, Continuous PRN  [Held by provider] apixaban (ELIQUIS) tablet 5 mg, 5 mg, Oral, BID  mirtazapine (REMERON) tablet 7.5 mg, 7.5 mg, Oral, Nightly  carbidopa-levodopa (SINEMET CR)  MG per extended release tablet 1 tablet, 1 tablet, Oral, TID  sertraline (ZOLOFT) tablet 50 mg, 50 mg, Oral, Daily  montelukast (SINGULAIR) tablet 10 mg, 10 mg, Oral, Nightly  Physical    VITALS:  BP (!) 89/46 Comment: midodrine given. BP also higher in leg (160/110). uncertain how accurate this reading is  Pulse 70   Temp 98.4 °F (36.9 °C) (Oral)   Resp 20   Ht 5' (1.524 m)   Wt 216 lb 0.8 oz (98 kg)   SpO2 96%   BMI 42.19 kg/m²   No acute distress moist membranes   Normocephalic, without obvious abnormality, atraumatic, external ears without lesions,   Neck supple no cervical lymphadenopathy  Heart has a regular rate and rhythm no murmur  Lungs are clear to auscultation bilaterally with equal movements. Anasarca: more improved on 2/15  good peripheral perfusion. No significant rashes or new skin lesions. No new focality on neuro exam which is overall grossly intact.   Affect and mood seem to be appropriate     Data    CBC:   Lab Results   Component Value Date/Time    WBC 7.3 02/16/2023 07:43 AM    RBC 3.05 02/16/2023 07:43 AM    HGB 7.7 02/16/2023 07:43 AM HCT 27.4 02/16/2023 07:43 AM    MCV 89.8 02/16/2023 07:43 AM    MCH 25.2 02/16/2023 07:43 AM    MCHC 28.1 02/16/2023 07:43 AM    RDW 20.5 02/16/2023 07:43 AM     02/16/2023 07:43 AM    MPV 11.7 02/16/2023 07:43 AM     BMP:    Lab Results   Component Value Date/Time     02/16/2023 07:43 AM    K 3.7 02/16/2023 07:43 AM    K 3.6 02/12/2023 07:01 AM    CL 98 02/16/2023 07:43 AM    CO2 29 02/16/2023 07:43 AM    BUN 31 02/16/2023 07:43 AM    LABALBU 3.7 02/16/2023 07:43 AM    CREATININE 3.5 02/16/2023 07:43 AM    CALCIUM 8.4 02/16/2023 07:43 AM    GFRAA >60 10/16/2022 09:20 AM    LABGLOM 13 02/16/2023 07:43 AM    GLUCOSE 87 02/16/2023 07:43 AM       ASSESSMENT AND PLAN    Patient was admitted with dyspnea and hypotension. She was found to be in fluid overload. She was diuresed with minimal improvement. She was treated with Bumex drip. Patient reluctantly agreed to dialysis. She is slowly improving with dialysis. However, pleural effusion still persistent. Acute on chronic respiratory failure with hypoxia and hypercapnia: breathing treatment . bipap prn. O2 as much as needs  DM type 2 complicated with nephropathy  CKD stage 3 now on dialysis  Hypotension: improved  Anemia due to CKD: hgb is stable. Ameena Moncada UTI: completed Zyvox  Parkinson's disease: overall stable. Continue home meds  Sleep Apnea: continue bipap as ordered. Pleural Effusion: per Pulm. DVT prophylaxis: Eliquis held for pleurex placement on 2/14  Full code. Palliative care encounter. POA is dtr Billy Cleveland.   There is a code for family members to call in. RN staff continues to report problematic interactions with patient's children  Disposition:  ltach denied again via appeal letter by tamiko

## 2023-02-16 NOTE — FLOWSHEET NOTE
02/16/23 1257   Vital Signs   BP (!) 164/48   Temp 98.7 °F (37.1 °C)   Heart Rate 72   Resp 22   Weight 216 lb 0.8 oz (98 kg)   Weight Method Bed scale   Percent Weight Change -3.07   Post-Hemodialysis Assessment   Post-Treatment Procedures Blood returned;Catheter capped, clamped and heparinized x 2 ports   Machine Disinfection Process Acid/Vinegar Clean;Exterior Machine Disinfection; Heat Disinfect   Rinseback Volume (ml) 300 ml   Blood Volume Processed (Liters) 65.5 l/min   Dialyzer Clearance Lightly streaked   Duration of Treatment (minutes) 240 minutes   Heparin Amount Administered During Treatment (mL) 0 mL   Hemodialysis Intake (ml) 300 ml   Hemodialysis Output (ml) 3500 ml   NET Removed (ml) 3200   Tolerated Treatment Good   Patient Response to Treatment treatment complete, patient tolerated well   Bilateral Breath Sounds Diminished   RUE Edema +1   LUE Edema +1   RLE Edema +1   LLE Edema +1   Physician Notified Yes   Time Off 1243   Patient Disposition Return to room

## 2023-02-16 NOTE — PROGRESS NOTES
Pulmonary/Critical Care Progress Note    We are following patient for severe diastolic heart failure requiring Pleurx catheter placement, cor pulmonale, obstructive sleep apnea, chronic kidney disease    SUBJECTIVE:  The patient's breathing improved significantly today after 1000 mL removed from Pleurx catheter yesterday. The patient developed hypotension today likely due to recent hemodialysis, Pleurx catheter drainage, and her oral antihypertensive medications. Patient given 25 g of albumin as well as a 500 cc normal saline bolus. She is now normotensive. Denies any chest pain or shortness of breath. Respirations easy at rest on 10 L high flow nasal cannula and she is in no distress. We will continue to drain Pleurx catheter as needed possibly every other day.     MEDICATIONS:   magnesium oxide  400 mg Oral Daily    midodrine  10 mg Oral TID WC    heparin (porcine)  5,000 Units SubCUTAneous 3 times per day    metoprolol succinate  25 mg Oral Daily    digoxin  125 mcg IntraVENous Daily    sucralfate  1 g Oral 4x Daily    rOPINIRole  1 mg Oral TID    atorvastatin  20 mg Oral Nightly    ipratropium-albuterol  1 vial Inhalation 4x Daily    [Held by provider] insulin glargine  13 Units SubCUTAneous BID    [Held by provider] LORazepam  0.5 mg Oral Nightly    insulin lispro  0-4 Units SubCUTAneous TID WC    insulin lispro  0-4 Units SubCUTAneous Nightly    budesonide  0.5 mg Nebulization BID    sodium chloride flush  10 mL IntraVENous 2 times per day    miconazole   Topical BID    arformoterol tartrate  15 mcg Nebulization BID    pantoprazole  40 mg Oral QAM AC    [Held by provider] apixaban  5 mg Oral BID    mirtazapine  7.5 mg Oral Nightly    carbidopa-levodopa  1 tablet Oral TID    sertraline  50 mg Oral Daily    montelukast  10 mg Oral Nightly      sodium chloride 500 mL/hr at 02/15/23 0828    sodium chloride 25 mL/hr at 02/04/23 0222    dextrose       sodium chloride, loperamide, sodium chloride flush, sodium chloride, promethazine **OR** ondansetron, polyethylene glycol, acetaminophen **OR** acetaminophen, glucose, dextrose bolus **OR** dextrose bolus, glucagon (rDNA), dextrose      REVIEW OF SYSTEMS:  Constitutional: Denies fever, weight loss, night sweats, and fatigue  Skin: Denies pigmentation, dark lesions, and rashes   HEENT: Denies hearing loss, tinnitus, ear drainage, epistaxis, sore throat, and hoarseness. Cardiovascular: Denies palpitations, chest pain, and chest pressure. Respiratory: Denies cough, dyspnea at rest, hemoptysis, apnea, and choking.   Gastrointestinal: Denies nausea, vomiting, poor appetite, diarrhea, heartburn or reflux  Genitourinary: Denies dysuria, frequency, urgency or hematuria  Musculoskeletal: Denies myalgias, muscle weakness, and bone pain  Neurological: Denies dizziness, vertigo, headache, and focal weakness  Psychological: Denies anxiety and depression  Endocrine: Denies heat intolerance and cold intolerance  Hematopoietic/Lymphatic: Denies bleeding problems and blood transfusions        OBJECTIVE:  Vitals:    02/15/23 1815   BP: (!) 138/58   Pulse: 60   Resp: 20   Temp:    SpO2: (!) 88%     FiO2 : 50 %  O2 Flow Rate (L/min): 10 L/min  O2 Device: High flow nasal cannula    PHYSICAL EXAM:  Constitutional: No fever, chills, diaphoresis  HEENT: No head lesions, PERRL, EOMI, mouth without lesions, no nasal lesions, no cervical adenopathy palpated   Respiratory: Respirations easy at rest on 10 L high flow nasal cannula, lungs diminished with fine crackles over lower lung fields posteriorly, no accessory muscle use   CV: Regular rate, no murmurs, JVD, no leg edema   Abdomen: Soft, non tender, + bowel sounds, no lesions   Skin: Adequate turgor, no rash, capillary refill <2 seconds   Extremities: Muscular strength 4/4 in 4 limbs, moves 4 limbs spontaneously, distal pulses present   Neurology: Awake and alert, follows commands, moves 4 limbs on command and spontaneously, equal sensation, no dysmetria, neck is supple, no meningitic signs present.     LABS:  WBC   Date Value Ref Range Status   02/13/2023 6.8 4.5 - 11.5 E9/L Final   02/08/2023 4.5 4.5 - 11.5 E9/L Final   02/06/2023 6.1 4.5 - 11.5 E9/L Final     Hemoglobin   Date Value Ref Range Status   02/13/2023 8.2 (L) 11.5 - 15.5 g/dL Final   02/08/2023 8.3 (L) 11.5 - 15.5 g/dL Final   02/06/2023 7.8 (L) 11.5 - 15.5 g/dL Final     Hematocrit   Date Value Ref Range Status   02/13/2023 31.9 (L) 34.0 - 48.0 % Final   02/08/2023 30.1 (L) 34.0 - 48.0 % Final   02/06/2023 27.5 (L) 34.0 - 48.0 % Final     MCV   Date Value Ref Range Status   02/13/2023 96.1 80.0 - 99.9 fL Final   02/08/2023 89.3 80.0 - 99.9 fL Final   02/06/2023 88.1 80.0 - 99.9 fL Final     Platelets   Date Value Ref Range Status   02/13/2023 153 130 - 450 E9/L Final   02/08/2023 159 130 - 450 E9/L Final   02/06/2023 135 130 - 450 E9/L Final     Sodium   Date Value Ref Range Status   02/15/2023 137 132 - 146 mmol/L Final   02/14/2023 139 132 - 146 mmol/L Final   02/13/2023 140 132 - 146 mmol/L Final     Potassium   Date Value Ref Range Status   02/15/2023 4.1 3.5 - 5.0 mmol/L Final   02/14/2023 3.7 3.5 - 5.0 mmol/L Final   02/13/2023 3.8 3.5 - 5.0 mmol/L Final     Potassium reflex Magnesium   Date Value Ref Range Status   02/12/2023 3.6 3.5 - 5.0 mmol/L Final   02/11/2023 3.4 (L) 3.5 - 5.0 mmol/L Final   02/10/2023 3.8 3.5 - 5.0 mmol/L Final     Chloride   Date Value Ref Range Status   02/15/2023 98 98 - 107 mmol/L Final   02/14/2023 99 98 - 107 mmol/L Final   02/13/2023 99 98 - 107 mmol/L Final     CO2   Date Value Ref Range Status   02/15/2023 28 22 - 29 mmol/L Final   02/14/2023 32 (H) 22 - 29 mmol/L Final   02/13/2023 33 (H) 22 - 29 mmol/L Final     BUN   Date Value Ref Range Status   02/15/2023 27 (H) 6 - 23 mg/dL Final   02/14/2023 20 6 - 23 mg/dL Final   02/13/2023 20 6 - 23 mg/dL Final     Creatinine   Date Value Ref Range Status   02/15/2023 3.2 (H) 0.5 - 1.0 mg/dL Final   02/14/2023 2.7 (H) 0.5 - 1.0 mg/dL Final   02/13/2023 2.4 (H) 0.5 - 1.0 mg/dL Final     Glucose   Date Value Ref Range Status   02/15/2023 92 74 - 99 mg/dL Final   02/14/2023 104 (H) 74 - 99 mg/dL Final   02/13/2023 157 (H) 74 - 99 mg/dL Final     Calcium   Date Value Ref Range Status   02/15/2023 8.5 (L) 8.6 - 10.2 mg/dL Final   02/14/2023 8.4 (L) 8.6 - 10.2 mg/dL Final   02/13/2023 8.4 (L) 8.6 - 10.2 mg/dL Final     Total Protein   Date Value Ref Range Status   02/15/2023 5.6 (L) 6.4 - 8.3 g/dL Final   02/14/2023 5.8 (L) 6.4 - 8.3 g/dL Final   02/13/2023 6.1 (L) 6.4 - 8.3 g/dL Final     Albumin   Date Value Ref Range Status   02/15/2023 3.9 3.5 - 5.2 g/dL Final   02/14/2023 4.0 3.5 - 5.2 g/dL Final   02/13/2023 4.2 3.5 - 5.2 g/dL Final     Total Bilirubin   Date Value Ref Range Status   02/15/2023 0.8 0.0 - 1.2 mg/dL Final   02/14/2023 0.6 0.0 - 1.2 mg/dL Final   02/13/2023 0.7 0.0 - 1.2 mg/dL Final     Alkaline Phosphatase   Date Value Ref Range Status   02/15/2023 48 35 - 104 U/L Final   02/14/2023 53 35 - 104 U/L Final   02/13/2023 55 35 - 104 U/L Final     AST   Date Value Ref Range Status   02/15/2023 <5 0 - 31 U/L Final   02/14/2023 10 0 - 31 U/L Final   02/13/2023 7 0 - 31 U/L Final     ALT   Date Value Ref Range Status   02/15/2023 <5 0 - 32 U/L Final   02/14/2023 <5 0 - 32 U/L Final   02/13/2023 <5 0 - 32 U/L Final     Est, Glom Filt Rate   Date Value Ref Range Status   02/15/2023 15 >=60 mL/min/1.73 Final     Comment:     Pediatric calculator link  [email or web address]/kdoqi/gfr_calculatorped  Effective Oct 3, 2022  These results are not intended for use in patients  <25years of age. eGFR results are calculated without  a race factor using the 2021 CKD-EPI equation. Careful  clinical correlation is recommended, particularly when  comparing to results calculated using previous equations.   The CKD-EPI equation is less accurate in patients with  extremes of muscle mass, extra-renal metabolism of  creatinine, excessive creatinine ingestion, or following  therapy that affects renal tubular secretion. 02/14/2023 18 >=60 mL/min/1.73 Final     Comment:     Pediatric calculator link  [employer]. [email or web address]/gfr_calculatorped  Effective Oct 3, 2022  These results are not intended for use in patients  <25years of age. eGFR results are calculated without  a race factor using the 2021 CKD-EPI equation. Careful  clinical correlation is recommended, particularly when  comparing to results calculated using previous equations. The CKD-EPI equation is less accurate in patients with  extremes of muscle mass, extra-renal metabolism of  creatinine, excessive creatinine ingestion, or following  therapy that affects renal tubular secretion. 02/13/2023 21 >=60 mL/min/1.73 Final     Comment:     Pediatric calculator link  [employer]. org/professionals/FilterEasyoqi/gfr_calculatorped  Effective Oct 3, 2022  These results are not intended for use in patients  <25years of age. eGFR results are calculated without  a race factor using the 2021 CKD-EPI equation. Careful  clinical correlation is recommended, particularly when  comparing to results calculated using previous equations. The CKD-EPI equation is less accurate in patients with  extremes of muscle mass, extra-renal metabolism of  creatinine, excessive creatinine ingestion, or following  therapy that affects renal tubular secretion.        GFR    Date Value Ref Range Status   10/16/2022 >60  Final   10/14/2022 59  Final   10/13/2022 >60  Final     Magnesium   Date Value Ref Range Status   02/15/2023 1.6 1.6 - 2.6 mg/dL Final   02/13/2023 1.6 1.6 - 2.6 mg/dL Final   02/11/2023 1.7 1.6 - 2.6 mg/dL Final     Phosphorus   Date Value Ref Range Status   02/13/2023 2.7 2.5 - 4.5 mg/dL Final   02/08/2023 4.1 2.5 - 4.5 mg/dL Final   02/07/2023 4.3 2.5 - 4.5 mg/dL Final     No results for input(s): PH, PO2, PCO2, HCO3, BE, O2SAT in the last 72 hours. RADIOLOGY:  XR CHEST PORTABLE   Final Result   Diminished right pleural effusion. New infiltrate and or atelectasis at the   right upper lobe. Stable infiltrate and or atelectasis at the left lower   lobe. XR CHEST 1 VIEW   Final Result   Stable abnormal chest with slightly improved ventilation of the right lung   with persistent large right pleural effusion with infiltrates and atelectasis   in the right lung base and to a lesser extent in the left base. Right chest tube which may be partially kinked. There is no pneumothorax. IR GUIDED PERC PLEURAL DRAIN W CATH INSERT   Final Result   1. Successful placement of a PleurX catheter within the right pleural space   2. 1000 cc of fluid was aspirated. IR GUIDED THORACENTESIS PLEURAL   Final Result   Successful ultrasound guided thoracentesis. IR FLUORO GUIDED CVA DEVICE PLMT/REPLACE/REMOVAL   Final Result   Successful placement of a tunneled dialysis catheter via the right internal   jugular vein. Administration of conscious sedation. IR ULTRASOUND GUIDANCE VASCULAR ACCESS   Final Result   Successful placement of a tunneled dialysis catheter via the right internal   jugular vein. Administration of conscious sedation. XR CHEST PORTABLE   Final Result   Large right pleural effusion with likely adjacent compressive atelectasis. Suspected atelectasis at the posterior basal segment of the left lower lobe. XR CHEST PORTABLE   Final Result   Lesser amount of aeration of the right lung         XR CHEST PORTABLE   Final Result   1. Slight improved aeration of the right lung when compared to prior study   2. The left lung is grossly clear   3. Cardiomegaly         XR CHEST PORTABLE   Final Result   1. New opacification seen within the right lung base which could represent   partial collapse of the right lower lobe   2. Reaccumulating right pleural effusion   3.  Right upper lobe airspace disease   4. There is no pneumothorax   5. The left lung is clear. XR CHEST 1 VIEW   Final Result   Improved ventilation of the right lung, status post thoracentesis with   possible tiny less than 5% right apical pneumothorax. .      Cavitary lesion in the right midlung field which may either represent a   necrotic malignancy versus pneumatosis. Consider CT scan. IR GUIDED THORACENTESIS PLEURAL   Final Result   Successful ultrasound guided thoracentesis. XR CHEST PORTABLE   Final Result   Complete opacification of the right hemithorax related to pleural effusion. Modest contralateral infiltrate and or atelectasis at the left lower chest.         CT ABDOMEN PELVIS WO CONTRAST Additional Contrast? None   Final Result   No nephroureterolithiasis or hydronephrosis. Splenomegaly. Small volume of perihepatic ascites. At least moderate-sized partially imaged right pleural effusion with   bibasilar atelectasis. Several locules of gas which appear to be within the endometrium at the level   of the uterine fundus, of uncertain etiology or clinical significance. Please correlate clinically. Diffuse anasarca throughout the soft tissues. Cardiomegaly. XR CHEST PORTABLE   Final Result   Stable right pleural effusion. PROBLEM LIST:  Principal Problem:    Acute on chronic respiratory failure with hypoxia and hypercapnia (HCC)  Active Problems:    Palliative care encounter  Resolved Problems:    * No resolved hospital problems. *      IMPRESSION:  Acute hypoxemic and hypercapnic respiratory failure  Severe diastolic heart failure resulting in large right pleural effusion  Severe obstructive sleep apnea  Obesity  Cor pulmonale  SHANTANU, CKD    PLAN:  Portable chest x-ray and procalcitonin in the a.m.   Drain pleural fluid every other day and discard  BiPAP nightly  Incentive spirometer      Electronically signed by RASHARD Green CNP on 2/15/2023 at 9:59 PM

## 2023-02-16 NOTE — PROGRESS NOTES
Physical Therapy  Physical Therapy Treatment Note/Plan of Care    Room #:  2517/7464-21  Patient Name: Alvarez Galvez  YOB: 1950  MRN: [id number]    Date of Service: 2/16/2023     Tentative placement recommendation: Subacute Rehab  Equipment recommendation:  To be determined      Evaluating Physical Therapist: Fátima Mcgovern PT, DPT #[id number]      Specific Provider Orders/Date/Referring Provider :     01/19/23 0745    PT eval and treat  Start:  01/19/23 0745,   End:  01/19/23 0745,   ONE TIME,   Standing Count:  1 Occurrences,   Sebas Hess MD Acknowledge New     Admitting Diagnosis:   NSTEMI (non-ST elevated myocardial infarction) [facility]) [I21.4]  Atrial fibrillation with rapid ventricular response (HCC) [I48.91]  Recurrent right pleural effusion [J90]  Hypotension, unspecified hypotension type [I95.9]  Acute on chronic respiratory failure with hypoxia and hypercapnia (HCC) [J96.21, J96.22]      Surgery: none  Visit Diagnoses         Codes    Recurrent right pleural effusion     J90    NSTEMI (non-ST elevated myocardial infarction) ([facility] [address]     I21.4    Postprocedural pneumothorax     J95.811            Patient Active Problem List   Diagnosis    Depression with anxiety    Osteoporosis    Asthma    Hyperlipidemia    Mitral regurgitation    Obstructive sleep apnea syndrome    Psoriasis    Diabetes mellitus ([address]    Parkinson's disease ([address]    Primary hypertension    Microalbuminuria    Morbid obesity (HCC)    RLS (restless legs syndrome)    Generalized weakness    Inability to walk    Hypothyroidism    Chest pain    Acute asthma exacerbation    Asthma exacerbation, mild    Paroxysmal atrial fibrillation (HCC)    Atrial fibrillation with rapid ventricular response (HCC)    Acute on chronic congestive heart failure ([address]    Coronary artery disease involving native coronary artery of native heart without angina pectoris    Dysphagia    Hepatosplenomegaly    Acute decompensated heart failure ([facility] [address]    Acute diastolic (congestive) heart failure (HCC)    Nonrheumatic tricuspid valve regurgitation    Tobacco abuse    Recurrent syncope    Septic shock (HCC)    Seizure-like activity (HCC)    Acute kidney injury ([facility] [address]    Pancytopenia (HCC)    Thrombocytopenia (HCC)    Encephalopathy    Acute respiratory failure with hypoxia (HCC)    Lactic acidosis    Delirium    Acute on chronic anemia    Pleural effusion, right    Acute respiratory failure with hypoxia and hypercapnia (HCC)    Acute confusion    Acute on chronic diastolic heart failure (HCC)    Macrocytosis    Other specified anemias    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[facility] [address]    Puerperal sepsis with acute hypoxic respiratory failure without septic shock (HCC)    Pulmonary HTN (HCC)    Chronic anticoagulation    RVF (right ventricular failure) (HCC)    Metabolic alkalosis    Sepsis (HCC)    COPD exacerbation (HCC)    Acute on chronic respiratory failure with hypercapnia (HCC)    Persistent atrial fibrillation (HCC)    Hypercapnic respiratory failure (HCC)    Acute on chronic respiratory failure (HCC)    Hyperkalemia    Heart failure (HCC)    Acute renal insufficiency    Hypotension    Anemia    Acute on chronic respiratory failure with hypoxia and hypercapnia (HCC)    Palliative care encounter        ASSESSMENT of Current Deficits Patient exhibits decreased strength, balance, and endurance impairing functional mobility, transfers, gait , gait distance, and tolerance to activity. Patient declined standing and bed mobility with max encouragement but was agreeable to supine exercises during session. Pt required AAROM for some supine exercises as well as MaxA x 2 to scoot toward HOB and ModA to roll during cleanup with nurse assistance.        PHYSICAL THERAPY  PLAN OF CARE       Physical therapy plan of care is established based on physician order,  patient diagnosis and clinical assessment    Current Treatment Recommendations:    -Bed Mobility: Lower extremity exercises  and Trunk control activities   -Sitting Balance: Incorporate reaching activities to activate trunk muscles , Hands on support to maintain midline , Facilitate active trunk muscle engagement , Facilitate postural control in all planes , and Engage in core activities to allow for movement within base of support   -Standing Balance: Perform strengthening exercises in standing to promote motor control with or without upper extremity support , Instruct patient on adequate base of support to maintain balance, and Challenge balance utilizing reaching  activities beyond center of gravity    -Transfers: Provide instruction on proper hand and foot position for adequate transfer of weight onto lower extremities and use of gait device if needed, Cues for hand placement, technique and safety. Provide stabilization to prevent fall , Facilitate weight shift forward on to lower extremities and provide necessary stabilization of bilateral lower extremities , Support transfer of weight on to lower extremities, and Assist with extension of knees trunk and hip to accept weight transfer   -Gait: Gait training, Standing activities to improve: base of support, weight shift, weight bearing , Exercises to improve trunk control, Exercises to improve hip and knee control, Performance of protected weight bearing activities, and Activities to increase weight bearing   -Endurance: Utilize Supervised activities to increase level of endurance to allow for safe functional mobility including transfers and gait  and Use graduated activities to promote good breathing techniques and provide support and education to maximize respiratory function    PT long term treatment goals are located in below grid    Patient and or family understand(s) diagnosis, prognosis, and plan of care. Frequency of treatments: Patient will be seen  3-5 times/week.          Prior Level of Function: Patient ambulated with wheeled walker for short distances  Rehab Potential: fair + for baseline    Past medical history:   Past Medical History:   Diagnosis Date    A-fib ([facility] [address]     Acute on chronic congestive heart failure (HCC)     Anxiety     Asthma     CAD (coronary artery disease) 01/21/2016    Cancer ([facility] [address]  breast ca 2006    right lumpectomy    Chronic kidney disease     nephrolithiasis    COPD exacerbation ([facility] [address] 10/12/2022    Depression     Diabetes mellitus ([facility] [address]     H/O mammogram     Hx MRSA infection     toe infection january 2012    Hyperlipidemia     Hypertension     Lateral epicondylitis     Morbid obesity (HCC)     SERENA on CPAP     Oxygen dependent     Parkinson's disease ([facility] [address]     Tubal ligation status      Past Surgical History:   Procedure Laterality Date    BREAST LUMPECTOMY      BREAST REDUCTION SURGERY      CARDIAC CATHETERIZATION  4/28/2014    Dr. Hesham Holly  01/11/2022    Dr. Quang Reed  7/29/15    CT GUIDED CHEST TUBE  7/8/2022    CT GUIDED CHEST TUBE 7/8/2022 Mary Lira MD [facility]    ENDOSCOPY, COLON, DIAGNOSTIC  7/19/15    GALLBLADDER SURGERY      IR PERC CATH PLEURAL DRAIN W/IMAG  2/14/2023    IR PERC CATH PLEURAL DRAIN W/IMAG 2/14/2023 [facility]    LUMBAR LAMINECTOMY      TOE AMPUTATION      TONSILLECTOMY      UPPER GASTROINTESTINAL ENDOSCOPY      UPPER GASTROINTESTINAL ENDOSCOPY N/A 7/19/2022    EGD ESOPHAGOGASTRODUODENOSCOPY performed by Sultana Godfrey MD at [address]:    Precautions:  Up with assistance, falls, O2, and morbid obesity      Social history: Patient from SNF    Equipment owned: Wheelchair, Wheeled Walker, and O2    [address] - Inpatient   How much help is needed turning from your back to your side while in a flat bed without using bedrails?: A Lot  How much help is needed moving from lying on your back to sitting on the side of a flat bed without using bedrails?: A Lot  How much help is needed moving to and from a bed to a chair?: Total  How much help is needed standing up from a chair using your arms?: Total  How much help is needed walking in hospital room?: Total  How much help is needed climbing 3-5 steps with a railing?: Total  AM-PAC Inpatient Mobility Raw Score : 8  AM-PAC Inpatient T-Scale Score : 28.52  Mobility Inpatient CMS 0-100% Score: 86.62  Mobility Inpatient CMS G-Code Modifier : CM    Nursing cleared patient for PT treatment. Patient c/o being tired. OBJECTIVE;   Initial Evaluation  Date: 1/19/2023 Treatment Date:  2/16/2023       Short Term/ Long Term   Goals   Was pt agreeable to Eval/treatment? Yes yes To be met in 3 days   Pain level   0/10   0/10    Bed Mobility    Rolling: Maximal assist of 1    Supine to sit: Maximal assist of 1    Sit to supine: Maximal assist of 1    Scooting: Maximal assist of 1   Rolling: Moderate assist of 1   Supine to sit: Not assessed    Sit to supine: Not assessed    Scooting: Maximal assist of  2    Rolling: Minimal assist of 1    Supine to sit: Minimal assist of 1    Sit to supine: Minimal assist of 1    Scooting: Minimal assist of 1     Transfers Sit to stand: Not assessed  d/t fair- seated balance and pt declining standing Sit to stand: Not assessed , pt declined with encouragement      Sit to stand:  Moderate assist of 1    Ambulation    not assessed  not assessed    > 15 feet using  wheeled walker with Moderate assist of 1    Stair negotiation: ascended and descended   Not assessed           ROM Within functional limits    Increase range of motion 10% of affected joints    Strength BUE:  refer to OT eval  RLE:  4-/5  LLE:  4-/5  Increase strength in affected mm groups by 1/3 grade   Balance Sitting EOB:  fair -  Dynamic Standing:  not assessed  Sitting EOB: fair   Dynamic Standing: not assessed    Sitting EOB:  fair   Dynamic Standing: fair - with [employer]     Patient is Alert & Oriented x person, place, time, and situation and follows directions    Sensation: Patient  denies numbness/tingling   Edema:  no   Endurance: poor+    Vitals:  9 liters nasal cannula   Blood Pressure at rest  Blood Pressure during session    Heart Rate at rest  Heart Rate during session    SPO2 at rest %  SPO2 during session 88-95%     Patient education  Patient educated on role of Physical Therapy, risks of immobility, safety and plan of care, energy conservation,  importance of mobility while in hospital , ankle pumps, quad set and glut set for edema control, blood clot prevention, safety , and positioning for skin integrity and comfort     Patient response to education:   Pt verbalized understanding Pt demonstrated skill Pt requires further education in this area   Yes Partial Yes      Treatment:  Patient practiced and was instructed/facilitated in the following treatment: Patient performed supine BLE & BUE exercises. Therapeutic Exercises:  ankle pumps, heel raises, heel slide, hip abduction/adduction, straight leg raise, and SAQ  15 reps      At end of session, patient in bed with     call light and phone within reach,  all lines and tubes intact, nursing notified. Patient would benefit from continued skilled Physical Therapy to improve functional independence and quality of life.          Patient's/ family goals   rehab    Time in  1538  Time out  1618    Total Treatment Time  40 minutes      CPT codes:  Therapeutic activities (09881)   15 minutes  1 unit(s)  Therapeutic exercises (76135)   25 minutes  2 unit(s)    Victor Hugo Bowers PT License Number [id number]

## 2023-02-17 LAB
ALBUMIN SERPL-MCNC: 3.9 G/DL (ref 3.5–5.2)
ALP BLD-CCNC: 53 U/L (ref 35–104)
ALT SERPL-CCNC: <5 U/L (ref 0–32)
ANION GAP SERPL CALCULATED.3IONS-SCNC: 14 MMOL/L (ref 7–16)
ANISOCYTOSIS: ABNORMAL
AST SERPL-CCNC: 12 U/L (ref 0–31)
BASOPHILS ABSOLUTE: 0.1 E9/L (ref 0–0.2)
BASOPHILS RELATIVE PERCENT: 1 % (ref 0–2)
BILIRUB SERPL-MCNC: 0.6 MG/DL (ref 0–1.2)
BUN BLDV-MCNC: 18 MG/DL (ref 6–23)
CALCIUM SERPL-MCNC: 8.5 MG/DL (ref 8.6–10.2)
CHLORIDE BLD-SCNC: 98 MMOL/L (ref 98–107)
CO2: 26 MMOL/L (ref 22–29)
CREAT SERPL-MCNC: 2.6 MG/DL (ref 0.5–1)
DIGOXIN LEVEL: 2.8 NG/ML (ref 0.8–2)
EOSINOPHILS ABSOLUTE: 0.31 E9/L (ref 0.05–0.5)
EOSINOPHILS RELATIVE PERCENT: 3 % (ref 0–6)
GFR SERPL CREATININE-BSD FRML MDRD: 19 ML/MIN/1.73
GLUCOSE BLD-MCNC: 84 MG/DL (ref 74–99)
HCT VFR BLD CALC: 28.8 % (ref 34–48)
HEMOGLOBIN: 8.1 G/DL (ref 11.5–15.5)
HYPOCHROMIA: ABNORMAL
LYMPHOCYTES ABSOLUTE: 3.19 E9/L (ref 1.5–4)
LYMPHOCYTES RELATIVE PERCENT: 31 % (ref 20–42)
MAGNESIUM: 1.7 MG/DL (ref 1.6–2.6)
MCH RBC QN AUTO: 25.7 PG (ref 26–35)
MCHC RBC AUTO-ENTMCNC: 28.1 % (ref 32–34.5)
MCV RBC AUTO: 91.4 FL (ref 80–99.9)
METER GLUCOSE: 102 MG/DL (ref 74–99)
METER GLUCOSE: 103 MG/DL (ref 74–99)
METER GLUCOSE: 110 MG/DL (ref 74–99)
METER GLUCOSE: 98 MG/DL (ref 74–99)
MONOCYTES ABSOLUTE: 0.62 E9/L (ref 0.1–0.95)
MONOCYTES RELATIVE PERCENT: 6 % (ref 2–12)
NEUTROPHILS ABSOLUTE: 6.08 E9/L (ref 1.8–7.3)
NEUTROPHILS RELATIVE PERCENT: 59 % (ref 43–80)
OVALOCYTES: ABNORMAL
PDW BLD-RTO: 20.7 FL (ref 11.5–15)
PHOSPHORUS: 2.6 MG/DL (ref 2.5–4.5)
PLATELET # BLD: 189 E9/L (ref 130–450)
PMV BLD AUTO: 12.5 FL (ref 7–12)
POIKILOCYTES: ABNORMAL
POLYCHROMASIA: ABNORMAL
POTASSIUM REFLEX MAGNESIUM: 4.7 MMOL/L (ref 3.5–5)
RBC # BLD: 3.15 E12/L (ref 3.5–5.5)
SODIUM BLD-SCNC: 138 MMOL/L (ref 132–146)
TOTAL PROTEIN: 5.9 G/DL (ref 6.4–8.3)
WBC # BLD: 10.3 E9/L (ref 4.5–11.5)

## 2023-02-17 PROCEDURE — 85025 COMPLETE CBC W/AUTO DIFF WBC: CPT

## 2023-02-17 PROCEDURE — 99232 SBSQ HOSP IP/OBS MODERATE 35: CPT | Performed by: NURSE PRACTITIONER

## 2023-02-17 PROCEDURE — 94660 CPAP INITIATION&MGMT: CPT

## 2023-02-17 PROCEDURE — 6360000002 HC RX W HCPCS

## 2023-02-17 PROCEDURE — 6370000000 HC RX 637 (ALT 250 FOR IP): Performed by: INTERNAL MEDICINE

## 2023-02-17 PROCEDURE — 6360000002 HC RX W HCPCS: Performed by: INTERNAL MEDICINE

## 2023-02-17 PROCEDURE — 94640 AIRWAY INHALATION TREATMENT: CPT

## 2023-02-17 PROCEDURE — 6370000000 HC RX 637 (ALT 250 FOR IP): Performed by: NURSE PRACTITIONER

## 2023-02-17 PROCEDURE — 80053 COMPREHEN METABOLIC PANEL: CPT

## 2023-02-17 PROCEDURE — 2580000003 HC RX 258

## 2023-02-17 PROCEDURE — 2060000000 HC ICU INTERMEDIATE R&B

## 2023-02-17 PROCEDURE — 6360000002 HC RX W HCPCS: Performed by: NURSE PRACTITIONER

## 2023-02-17 PROCEDURE — 99232 SBSQ HOSP IP/OBS MODERATE 35: CPT | Performed by: INTERNAL MEDICINE

## 2023-02-17 PROCEDURE — 82962 GLUCOSE BLOOD TEST: CPT

## 2023-02-17 PROCEDURE — 80162 ASSAY OF DIGOXIN TOTAL: CPT

## 2023-02-17 PROCEDURE — 2700000000 HC OXYGEN THERAPY PER DAY

## 2023-02-17 PROCEDURE — 2580000003 HC RX 258: Performed by: INTERNAL MEDICINE

## 2023-02-17 PROCEDURE — 84100 ASSAY OF PHOSPHORUS: CPT

## 2023-02-17 PROCEDURE — 83735 ASSAY OF MAGNESIUM: CPT

## 2023-02-17 PROCEDURE — 36415 COLL VENOUS BLD VENIPUNCTURE: CPT

## 2023-02-17 RX ORDER — DEXTROSE MONOHYDRATE 100 MG/ML
INJECTION, SOLUTION INTRAVENOUS CONTINUOUS PRN
Status: DISCONTINUED | OUTPATIENT
Start: 2023-02-17 | End: 2023-02-24 | Stop reason: HOSPADM

## 2023-02-17 RX ORDER — ALBUTEROL SULFATE 2.5 MG/3ML
2.5 SOLUTION RESPIRATORY (INHALATION) 4 TIMES DAILY
Status: DISCONTINUED | OUTPATIENT
Start: 2023-02-17 | End: 2023-02-24 | Stop reason: HOSPADM

## 2023-02-17 RX ADMIN — HEPARIN SODIUM 5000 UNITS: 5000 INJECTION INTRAVENOUS; SUBCUTANEOUS at 13:32

## 2023-02-17 RX ADMIN — MIDODRINE HYDROCHLORIDE 10 MG: 5 TABLET ORAL at 13:31

## 2023-02-17 RX ADMIN — CARBIDOPA AND LEVODOPA 1 TABLET: 50; 200 TABLET, EXTENDED RELEASE ORAL at 23:46

## 2023-02-17 RX ADMIN — MIRTAZAPINE 7.5 MG: 15 TABLET, FILM COATED ORAL at 23:46

## 2023-02-17 RX ADMIN — HEPARIN SODIUM 5000 UNITS: 5000 INJECTION INTRAVENOUS; SUBCUTANEOUS at 23:46

## 2023-02-17 RX ADMIN — CARBIDOPA AND LEVODOPA 1 TABLET: 50; 200 TABLET, EXTENDED RELEASE ORAL at 15:54

## 2023-02-17 RX ADMIN — IPRATROPIUM BROMIDE 0.5 MG: 0.5 SOLUTION RESPIRATORY (INHALATION) at 18:49

## 2023-02-17 RX ADMIN — MIDODRINE HYDROCHLORIDE 10 MG: 5 TABLET ORAL at 17:45

## 2023-02-17 RX ADMIN — ATORVASTATIN CALCIUM 20 MG: 20 TABLET, FILM COATED ORAL at 23:46

## 2023-02-17 RX ADMIN — MONTELUKAST SODIUM 10 MG: 10 TABLET, FILM COATED ORAL at 23:46

## 2023-02-17 RX ADMIN — ANTI-FUNGAL POWDER MICONAZOLE NITRATE TALC FREE: 1.42 POWDER TOPICAL at 09:31

## 2023-02-17 RX ADMIN — MIDODRINE HYDROCHLORIDE 10 MG: 5 TABLET ORAL at 05:56

## 2023-02-17 RX ADMIN — BUDESONIDE 500 MCG: 0.5 SUSPENSION RESPIRATORY (INHALATION) at 18:49

## 2023-02-17 RX ADMIN — CARBIDOPA AND LEVODOPA 1 TABLET: 50; 200 TABLET, EXTENDED RELEASE ORAL at 09:29

## 2023-02-17 RX ADMIN — IPRATROPIUM BROMIDE AND ALBUTEROL SULFATE 3 ML: .5; 2.5 SOLUTION RESPIRATORY (INHALATION) at 06:13

## 2023-02-17 RX ADMIN — ALBUTEROL SULFATE 2.5 MG: 2.5 SOLUTION RESPIRATORY (INHALATION) at 18:49

## 2023-02-17 RX ADMIN — ROPINIROLE HYDROCHLORIDE 1 MG: 1 TABLET, FILM COATED ORAL at 13:31

## 2023-02-17 RX ADMIN — Medication 10 ML: at 23:47

## 2023-02-17 RX ADMIN — ALBUTEROL SULFATE 2.5 MG: 2.5 SOLUTION RESPIRATORY (INHALATION) at 09:48

## 2023-02-17 RX ADMIN — IPRATROPIUM BROMIDE 0.5 MG: 0.5 SOLUTION RESPIRATORY (INHALATION) at 15:47

## 2023-02-17 RX ADMIN — PANTOPRAZOLE SODIUM 40 MG: 40 TABLET, DELAYED RELEASE ORAL at 05:56

## 2023-02-17 RX ADMIN — ROPINIROLE HYDROCHLORIDE 1 MG: 1 TABLET, FILM COATED ORAL at 23:46

## 2023-02-17 RX ADMIN — SUCRALFATE 1 G: 1 TABLET ORAL at 23:46

## 2023-02-17 RX ADMIN — SUCRALFATE 1 G: 1 TABLET ORAL at 09:30

## 2023-02-17 RX ADMIN — ROPINIROLE HYDROCHLORIDE 1 MG: 1 TABLET, FILM COATED ORAL at 09:30

## 2023-02-17 RX ADMIN — ARFORMOTEROL TARTRATE 15 MCG: 15 SOLUTION RESPIRATORY (INHALATION) at 06:13

## 2023-02-17 RX ADMIN — HEPARIN SODIUM 5000 UNITS: 5000 INJECTION INTRAVENOUS; SUBCUTANEOUS at 05:56

## 2023-02-17 RX ADMIN — BUDESONIDE 500 MCG: 0.5 SUSPENSION RESPIRATORY (INHALATION) at 06:13

## 2023-02-17 RX ADMIN — Medication 10 ML: at 09:30

## 2023-02-17 RX ADMIN — SUCRALFATE 1 G: 1 TABLET ORAL at 17:45

## 2023-02-17 RX ADMIN — ALBUTEROL SULFATE 2.5 MG: 2.5 SOLUTION RESPIRATORY (INHALATION) at 15:48

## 2023-02-17 RX ADMIN — MAGNESIUM OXIDE 400 MG: 400 TABLET ORAL at 09:30

## 2023-02-17 RX ADMIN — ARFORMOTEROL TARTRATE 15 MCG: 15 SOLUTION RESPIRATORY (INHALATION) at 18:49

## 2023-02-17 RX ADMIN — SODIUM CHLORIDE 125 MG: 9 INJECTION, SOLUTION INTRAVENOUS at 11:20

## 2023-02-17 RX ADMIN — SERTRALINE 50 MG: 50 TABLET, FILM COATED ORAL at 09:29

## 2023-02-17 RX ADMIN — SUCRALFATE 1 G: 1 TABLET ORAL at 13:31

## 2023-02-17 RX ADMIN — IPRATROPIUM BROMIDE 0.5 MG: 0.5 SOLUTION RESPIRATORY (INHALATION) at 09:48

## 2023-02-17 RX ADMIN — ACETAMINOPHEN 650 MG: 325 TABLET ORAL at 15:53

## 2023-02-17 RX ADMIN — ANTI-FUNGAL POWDER MICONAZOLE NITRATE TALC FREE: 1.42 POWDER TOPICAL at 23:47

## 2023-02-17 ASSESSMENT — PAIN DESCRIPTION - LOCATION: LOCATION: BACK

## 2023-02-17 ASSESSMENT — PAIN SCALES - GENERAL
PAINLEVEL_OUTOF10: 3
PAINLEVEL_OUTOF10: 0

## 2023-02-17 NOTE — PROGRESS NOTES
met with patient. Patient stated she wants to go home.  offered a listening presence, nurtured hope, prayer, and scripture. Spiritual care is ongoing.

## 2023-02-17 NOTE — CARE COORDINATION
2/17/23 1742 CM note: COVID (-) 1/17/23. 9L hfnc vs bipap FIO2 50%. IV ferrlecit. Palliative was re-consulted. NEW HD this admit with tunneled catheter in place. Pleurx catheter placed 2/14/23 with 1000cc taken off that day. Pt came to us from [facility]. Per [facility], [facility], they can accept patient back on up to 10L hfnc, bipap FIO2 okay at 50%, they can take pt on HD and can provide transportation to HD appts but are hoping that pt won't need daily HD at time of discharge. They have no issues with pleurx catheter, they will just need orders on frequency and amount to drain. WILL NEED RAPID COVID TEST DAY OF DC, SIGNED [place], AND DC ORDER. Insurance has declined LTAC. CM/SW will follow.  Electronically signed by Diana Woodson RN on 2/17/2023 at 5:51 PM

## 2023-02-17 NOTE — PROGRESS NOTES
Physical Therapy        4778/4216-33    Patient unavailable for physical therapy treatment due to patient declining therapy due to wanting rest. Will attempt session at later time/date.      Rafat Buchanan, PTA  #[id number]

## 2023-02-17 NOTE — PROGRESS NOTES
Department of Internal Medicine  General Internal Medicine  Attending Progress Note  Chief Complaint   Patient presents with    Respiratory Distress     Normally on 3L NC, came in on CPAP, given total of 50mcg push dose epi for low BP by EMS     No complaints.    hd  OBJECTIVE      Medications    Current Facility-Administered Medications: albuterol (PROVENTIL) nebulizer solution 2.5 mg, 2.5 mg, Nebulization, 4x daily **AND** ipratropium (ATROVENT) 0.02 % nebulizer solution 0.5 mg, 0.5 mg, Nebulization, 4x daily  ferric gluconate (FERRLECIT) 125 mg in sodium chloride 0.9 % 100 mL IVPB, 125 mg, IntraVENous, Daily  magnesium oxide (MAG-OX) tablet 400 mg, 400 mg, Oral, Daily  midodrine (PROAMATINE) tablet 10 mg, 10 mg, Oral, TID WC  heparin (porcine) injection 5,000 Units, 5,000 Units, SubCUTAneous, 3 times per day  metoprolol succinate (TOPROL XL) extended release tablet 25 mg, 25 mg, Oral, Daily  digoxin (LANOXIN) injection 125 mcg, 125 mcg, IntraVENous, Daily  sodium chloride (OCEAN, BABY AYR) 0.65 % nasal spray 1 spray, 1 spray, Each Nostril, PRN  sucralfate (CARAFATE) tablet 1 g, 1 g, Oral, 4x Daily  rOPINIRole (REQUIP) tablet 1 mg, 1 mg, Oral, TID  atorvastatin (LIPITOR) tablet 20 mg, 20 mg, Oral, Nightly  [Held by provider] insulin glargine (LANTUS) injection vial 13 Units, 13 Units, SubCUTAneous, BID  loperamide (IMODIUM) capsule 2 mg, 2 mg, Oral, 4x Daily PRN  [Held by provider] LORazepam (ATIVAN) tablet 0.5 mg, 0.5 mg, Oral, Nightly  insulin lispro (HUMALOG) injection vial 0-4 Units, 0-4 Units, SubCUTAneous, TID WC  insulin lispro (HUMALOG) injection vial 0-4 Units, 0-4 Units, SubCUTAneous, Nightly  budesonide (PULMICORT) nebulizer suspension 500 mcg, 0.5 mg, Nebulization, BID  sodium chloride flush 0.9 % injection 10 mL, 10 mL, IntraVENous, 2 times per day  sodium chloride flush 0.9 % injection 10 mL, 10 mL, IntraVENous, PRN  0.9 % sodium chloride infusion, , IntraVENous, PRN  promethazine (PHENERGAN) tablet 12.5 mg, 12.5 mg, Oral, Q6H PRN **OR** ondansetron (ZOFRAN) injection 4 mg, 4 mg, IntraVENous, Q6H PRN  polyethylene glycol (GLYCOLAX) packet 17 g, 17 g, Oral, Daily PRN  acetaminophen (TYLENOL) tablet 650 mg, 650 mg, Oral, Q6H PRN **OR** acetaminophen (TYLENOL) suppository 650 mg, 650 mg, Rectal, Q6H PRN  miconazole (MICOTIN) 2 % powder, , Topical, BID  arformoterol tartrate (BROVANA) nebulizer solution 15 mcg, 15 mcg, Nebulization, BID  pantoprazole (PROTONIX) tablet 40 mg, 40 mg, Oral, QAM AC  glucose chewable tablet 16 g, 4 tablet, Oral, PRN  dextrose bolus 10% 125 mL, 125 mL, IntraVENous, PRN **OR** dextrose bolus 10% 250 mL, 250 mL, IntraVENous, PRN  glucagon (rDNA) injection 1 mg, 1 mg, SubCUTAneous, PRN  dextrose 10 % infusion, , IntraVENous, Continuous PRN  [Held by provider] apixaban (ELIQUIS) tablet 5 mg, 5 mg, Oral, BID  mirtazapine (REMERON) tablet 7.5 mg, 7.5 mg, Oral, Nightly  carbidopa-levodopa (SINEMET CR)  MG per extended release tablet 1 tablet, 1 tablet, Oral, TID  sertraline (ZOLOFT) tablet 50 mg, 50 mg, Oral, Daily  montelukast (SINGULAIR) tablet 10 mg, 10 mg, Oral, Nightly  Physical    VITALS:  BP (!) 104/59   Pulse 63   Temp 98.2 °F (36.8 °C) (Oral)   Resp 20   Ht 5' (1.524 m)   Wt 216 lb 0.8 oz (98 kg)   SpO2 98%   BMI 42.19 kg/m²   No acute distress moist membranes   Normocephalic, without obvious abnormality, atraumatic, external ears without lesions,   Neck supple no cervical lymphadenopathy  Heart has a regular rate and rhythm no murmur  Lungs are clear to auscultation bilaterally with equal movements. Anasarca still present  good peripheral perfusion. No significant rashes or new skin lesions. No new focality on neuro exam which is overall grossly intact.   Affect and mood seem to be appropriate     Data    CBC:   Lab Results   Component Value Date/Time    WBC 10.3 02/17/2023 09:20 AM    RBC 3.15 02/17/2023 09:20 AM    HGB 8.1 02/17/2023 09:20 AM    HCT 28.8 02/17/2023 09:20 AM    MCV 91.4 02/17/2023 09:20 AM    MCH 25.7 02/17/2023 09:20 AM    MCHC 28.1 02/17/2023 09:20 AM    RDW 20.7 02/17/2023 09:20 AM     02/17/2023 09:20 AM    MPV 12.5 02/17/2023 09:20 AM     BMP:    Lab Results   Component Value Date/Time     02/17/2023 09:20 AM    K 4.7 02/17/2023 09:20 AM    CL 98 02/17/2023 09:20 AM    CO2 26 02/17/2023 09:20 AM    BUN 18 02/17/2023 09:20 AM    LABALBU 3.9 02/17/2023 09:20 AM    CREATININE 2.6 02/17/2023 09:20 AM    CALCIUM 8.5 02/17/2023 09:20 AM    GFRAA >60 10/16/2022 09:20 AM    LABGLOM 19 02/17/2023 09:20 AM    GLUCOSE 84 02/17/2023 09:20 AM       ASSESSMENT AND PLAN    Patient was admitted with dyspnea and hypotension. She was found to be in fluid overload. She was diuresed with minimal improvement. She was treated with Bumex drip. Patient reluctantly agreed to dialysis. She is slowly improving with dialysis. However, pleural effusion still persistent. Acute on chronic respiratory failure with hypoxia and hypercapnia: breathing treatment . bipap prn. O2 as much as needs  DM type 2 complicated with nephropathy  CKD stage 3 now on dialysis for volume overload  Hypotension: improved  Anemia due to CKD: hgb is stable. EJolinda Signs UTI: completed Zyvox  Parkinson's disease: overall stable. Continue home meds  Sleep Apnea: continue bipap as ordered. Pleural Effusion: per Pulm. DVT prophylaxis: Eliquis held for pleurex placement on 2/14  Full code. Palliative care encounter. POA is dtr [employer]. There is a code for family members to call in. RN staff continues to report problematic interactions with patient's children  Disposition:  ltach denied again via appeal letter by cm    I spoke to Dr Destiny Vargas early this am to confirm plan: continue ultrafiltration daily if BP permits until next week. If she overall improves clinically then she might be ready for facility. If not she will need a change in plan  Long talk with Dtdaysi Steele about all of this. She will inform rest of family. Change of plan would be according to her Hospice. We discussed in detail best way for her to be informed next week of ultrafiltration is working. Early in the week dtr thinks would be best to start preparing for patient. We discussed that patient seems to already understand plan. So we will not remind patient in big family meeting until further plans are made early next week. We discussed timing of hospice consult can be either Monday Tuesday or wednesday. This timing would be determined by how she is doing in dialysis. Currently annika says readdressing code status would only be anxiety provoking so this will be postponed.

## 2023-02-17 NOTE — CONSULTS
Palliative Care Department  [phone]  Palliative Care Progress Note  Provider Ирина Leung, APRN - CNP     Alvarez Galvez  [id number]  Hospital Day: 28  Date of Initial Consult: 1/30/2023, re-consult 2/17/2023  Referring Provider: Leslie Her MD   Palliative Medicine was consulted for assistance with: Goals of care, CODE STATUS discussion, family support, symptom management    HPI:   Alvarez Galvez is a 67 y.o. with a medical history of CHF, A. fib, anxiety, asthma, CAD, breast CA, COPD, depression, diabetes, hyperlipidemia, hypertension, SERENA, Parkinson's disease who was admitted on 1/17/2023 from nursing facility with a CHIEF COMPLAINT of hypotension, weight gain, leg swelling. Patient states that she is having shortness of breath. She wears 2 to 3 L O2 via nasal cannula at baseline. Patient was at CHF clinic and was hypotensive and had a 4 pound weight gain over the past week. In ED, patient was found to be hypotensive with A. fib RVR and respiratory failure was placed on BiPAP. Chest x-ray showed stable right pleural effusion. Patient had a repeat chest x-ray 1/30/2023 which showed complete opacification of the right hemithorax related to pleural effusion. Modest contralateral infiltrate ileum and/or atelectasis at the left lower chest.  Patient with persistent right pleural effusion requiring Pleurx catheter placement on 2/14/2023. Due to worsening in kidney function patient was started on hemodialysis on 2/9/23. Patient remains on 9 L high flow nasal cannula. Pulmonology, ID, nephrology following. Palliative medicine consulted for further assistance.     ASSESSMENT/PLAN:     Pertinent Hospital Diagnoses     Acute on chronic respiratory failure with hypoxia  Congestive heart failure  Atrial fibrillation  Urinary tract infection  Acute on chronic kidney disease      Palliative Care Encounter / Counseling Regarding Goals of Care  Please see detailed goals of care discussion as below  At this time, Santi Covington, Does have capacity for medical decision-making. Capacity is time limited and situation/question specific  During encounter there was no surrogate medical decision-maker  Outcome of goals of care meeting:   Continue current medical management  Discussed CODE STATUS options  Code status Full Code  Advanced Directives: no POA or living will in epic, POA in soft chart  Surrogate/Legal NOK:  Priyank Ashby (child) [phone]  Gabriele Rodriguez (child) [phone]  Sergio Menendez (child) [phone]    Spiritual assessment: no spiritual distress identified  Bereavement and grief: to be determined  Referrals to: none today  SUBJECTIVE:     Current medical issues leading to Palliative Medicine involvement include   Active Hospital Problems    Diagnosis Date Noted    Palliative care encounter [Z51.5] 01/31/2023     Priority: Medium    Acute on chronic respiratory failure with hypoxia and hypercapnia ([facility] [address] [Y90.17, J96.22] 01/17/2023     Priority: Medium       Details of Conversation:    Chart reviewed. Patient seen at the bedside, alert and oriented x4. Patient able to partake in meaningful conversation and able to make decisions for herself. No family present at bedside. Introduced self and role of palliative medicine. Discussed patient's current condition and comorbidities. Patient is now on hemodialysis and had Pleurx catheter placed. Patient has required ultrafiltration and dialysis daily. Patient is currently on 9 L high flow nasal cannula and BiPAP intermittently. Healthcare power of  documents in patient soft chart, patient states that [place] continues to be primary decision-maker and Xavier Crouch was secondary decision-maker. Discussed goals of care and CODE STATUS options. Patient states in the event of cardiopulmonary arrest, she would want to be resuscitated.  I reviewed with the Patient that given multiple medical co-morbidities, advanced disease process, and current severe acute illness, in the event of cardiac arrest, the chances of surviving may likely be low. It was also reviewed that if they survived a cardiac arrest, a return to prior level of function also may be low. Patient understands. Discussed with patient that we will monitor clinical progression with ultrafiltration. If patient is able to tolerate and show signs of improvement then we will continue dialysis. If patient does not show signs of improvement and continues to decline then we will need to discuss further with CODE STATUS and what options are available to her. Patient understands. All questions and concerns addressed. Palliative medicine to follow. OBJECTIVE:   Prognosis: Guarded    Physical Exam:  BP (!) 104/59   Pulse 63   Temp 98.2 °F (36.8 °C) (Oral)   Resp 20   Ht 5' (1.524 m)   Wt 216 lb 0.8 oz (98 kg)   SpO2 98%   BMI 42.19 kg/m²   Constitutional:  Obese, awake, alert  Eyes: no scleral icterus, normal lids, no discharge  ENMT:  Normocephalic, atraumatic, mucosa moist, EOMI  Neck:  trachea midline, no JVD  Lungs:  Diminished  Heart[de-identified]  Irregular  Abd:  Soft, obese, non tender, distended, bowel sounds present  Ext:  Weakness, +edema, pulses present  Skin:  Warm and dry, impaired skin integrity  Psych: non-anxious  Neuro:  A&Ox4, following commands    Objective data reviewed: labs, images, records, medication use, vitals, and chart    Discussed patient and the plan of care with the other IDT members: Palliative Medicine IDT Team, Floor Nurse, and Patient    Time/Communication  Greater than 50% of time spent, total 35 minutes in counseling and coordination of care at the bedside regarding goals of care and diagnosis and prognosis. Thank you for allowing Palliative Medicine to participate in the care of Kareem Pickens.

## 2023-02-18 LAB
ALBUMIN SERPL-MCNC: 4 G/DL (ref 3.5–5.2)
ALP BLD-CCNC: 55 U/L (ref 35–104)
ALT SERPL-CCNC: <5 U/L (ref 0–32)
ANION GAP SERPL CALCULATED.3IONS-SCNC: 10 MMOL/L (ref 7–16)
ANISOCYTOSIS: ABNORMAL
AST SERPL-CCNC: 6 U/L (ref 0–31)
BASOPHILS ABSOLUTE: 0.07 E9/L (ref 0–0.2)
BASOPHILS RELATIVE PERCENT: 0.9 % (ref 0–2)
BILIRUB SERPL-MCNC: 0.5 MG/DL (ref 0–1.2)
BUN BLDV-MCNC: 24 MG/DL (ref 6–23)
CALCIUM SERPL-MCNC: 8.4 MG/DL (ref 8.6–10.2)
CHLORIDE BLD-SCNC: 97 MMOL/L (ref 98–107)
CO2: 30 MMOL/L (ref 22–29)
CREAT SERPL-MCNC: 3.2 MG/DL (ref 0.5–1)
EOSINOPHILS ABSOLUTE: 0.21 E9/L (ref 0.05–0.5)
EOSINOPHILS RELATIVE PERCENT: 2.6 % (ref 0–6)
GFR SERPL CREATININE-BSD FRML MDRD: 15 ML/MIN/1.73
GLUCOSE BLD-MCNC: 105 MG/DL (ref 74–99)
HCT VFR BLD CALC: 28.4 % (ref 34–48)
HEMOGLOBIN: 7.6 G/DL (ref 11.5–15.5)
HYPOCHROMIA: ABNORMAL
LYMPHOCYTES ABSOLUTE: 1.74 E9/L (ref 1.5–4)
LYMPHOCYTES RELATIVE PERCENT: 21.7 % (ref 20–42)
MCH RBC QN AUTO: 25.1 PG (ref 26–35)
MCHC RBC AUTO-ENTMCNC: 26.8 % (ref 32–34.5)
MCV RBC AUTO: 93.7 FL (ref 80–99.9)
METER GLUCOSE: 136 MG/DL (ref 74–99)
METER GLUCOSE: 141 MG/DL (ref 74–99)
METER GLUCOSE: 95 MG/DL (ref 74–99)
MONOCYTES ABSOLUTE: 0.4 E9/L (ref 0.1–0.95)
MONOCYTES RELATIVE PERCENT: 5.2 % (ref 2–12)
MYELOCYTE PERCENT: 0.9 % (ref 0–0)
NEUTROPHILS ABSOLUTE: 5.53 E9/L (ref 1.8–7.3)
NEUTROPHILS RELATIVE PERCENT: 68.7 % (ref 43–80)
NUCLEATED RED BLOOD CELLS: 0.9 /100 WBC
OVALOCYTES: ABNORMAL
PDW BLD-RTO: 20.1 FL (ref 11.5–15)
PLATELET # BLD: 174 E9/L (ref 130–450)
PMV BLD AUTO: 11.9 FL (ref 7–12)
POIKILOCYTES: ABNORMAL
POLYCHROMASIA: ABNORMAL
POTASSIUM REFLEX MAGNESIUM: 3.7 MMOL/L (ref 3.5–5)
RBC # BLD: 3.03 E12/L (ref 3.5–5.5)
SODIUM BLD-SCNC: 137 MMOL/L (ref 132–146)
TEAR DROP CELLS: ABNORMAL
TOTAL PROTEIN: 6 G/DL (ref 6.4–8.3)
WBC # BLD: 7.9 E9/L (ref 4.5–11.5)

## 2023-02-18 PROCEDURE — 80053 COMPREHEN METABOLIC PANEL: CPT

## 2023-02-18 PROCEDURE — 85025 COMPLETE CBC W/AUTO DIFF WBC: CPT

## 2023-02-18 PROCEDURE — 2580000003 HC RX 258: Performed by: INTERNAL MEDICINE

## 2023-02-18 PROCEDURE — 99232 SBSQ HOSP IP/OBS MODERATE 35: CPT | Performed by: INTERNAL MEDICINE

## 2023-02-18 PROCEDURE — 94660 CPAP INITIATION&MGMT: CPT

## 2023-02-18 PROCEDURE — 2700000000 HC OXYGEN THERAPY PER DAY

## 2023-02-18 PROCEDURE — 6370000000 HC RX 637 (ALT 250 FOR IP): Performed by: INTERNAL MEDICINE

## 2023-02-18 PROCEDURE — 6360000002 HC RX W HCPCS: Performed by: INTERNAL MEDICINE

## 2023-02-18 PROCEDURE — 82962 GLUCOSE BLOOD TEST: CPT

## 2023-02-18 PROCEDURE — 36415 COLL VENOUS BLD VENIPUNCTURE: CPT

## 2023-02-18 PROCEDURE — 90935 HEMODIALYSIS ONE EVALUATION: CPT

## 2023-02-18 PROCEDURE — 6370000000 HC RX 637 (ALT 250 FOR IP): Performed by: NURSE PRACTITIONER

## 2023-02-18 PROCEDURE — 6360000002 HC RX W HCPCS: Performed by: NURSE PRACTITIONER

## 2023-02-18 PROCEDURE — 2060000000 HC ICU INTERMEDIATE R&B

## 2023-02-18 PROCEDURE — 94640 AIRWAY INHALATION TREATMENT: CPT

## 2023-02-18 RX ADMIN — ONDANSETRON 4 MG: 2 INJECTION INTRAMUSCULAR; INTRAVENOUS at 15:21

## 2023-02-18 RX ADMIN — ATORVASTATIN CALCIUM 20 MG: 20 TABLET, FILM COATED ORAL at 21:04

## 2023-02-18 RX ADMIN — MIDODRINE HYDROCHLORIDE 10 MG: 5 TABLET ORAL at 18:34

## 2023-02-18 RX ADMIN — CARBIDOPA AND LEVODOPA 1 TABLET: 50; 200 TABLET, EXTENDED RELEASE ORAL at 15:24

## 2023-02-18 RX ADMIN — Medication 10 ML: at 21:05

## 2023-02-18 RX ADMIN — ALBUTEROL SULFATE 2.5 MG: 2.5 SOLUTION RESPIRATORY (INHALATION) at 13:29

## 2023-02-18 RX ADMIN — IPRATROPIUM BROMIDE 0.5 MG: 0.5 SOLUTION RESPIRATORY (INHALATION) at 04:35

## 2023-02-18 RX ADMIN — CARBIDOPA AND LEVODOPA 1 TABLET: 50; 200 TABLET, EXTENDED RELEASE ORAL at 21:04

## 2023-02-18 RX ADMIN — ARFORMOTEROL TARTRATE 15 MCG: 15 SOLUTION RESPIRATORY (INHALATION) at 04:35

## 2023-02-18 RX ADMIN — SUCRALFATE 1 G: 1 TABLET ORAL at 21:04

## 2023-02-18 RX ADMIN — METOPROLOL SUCCINATE 25 MG: 25 TABLET, FILM COATED, EXTENDED RELEASE ORAL at 15:25

## 2023-02-18 RX ADMIN — ROPINIROLE HYDROCHLORIDE 1 MG: 1 TABLET, FILM COATED ORAL at 15:25

## 2023-02-18 RX ADMIN — IPRATROPIUM BROMIDE 0.5 MG: 0.5 SOLUTION RESPIRATORY (INHALATION) at 13:29

## 2023-02-18 RX ADMIN — HEPARIN SODIUM 5000 UNITS: 5000 INJECTION INTRAVENOUS; SUBCUTANEOUS at 05:23

## 2023-02-18 RX ADMIN — ALBUTEROL SULFATE 2.5 MG: 2.5 SOLUTION RESPIRATORY (INHALATION) at 04:35

## 2023-02-18 RX ADMIN — MIDODRINE HYDROCHLORIDE 10 MG: 5 TABLET ORAL at 15:24

## 2023-02-18 RX ADMIN — ONDANSETRON 4 MG: 2 INJECTION INTRAMUSCULAR; INTRAVENOUS at 21:14

## 2023-02-18 RX ADMIN — ANTI-FUNGAL POWDER MICONAZOLE NITRATE TALC FREE: 1.42 POWDER TOPICAL at 21:05

## 2023-02-18 RX ADMIN — SUCRALFATE 1 G: 1 TABLET ORAL at 15:25

## 2023-02-18 RX ADMIN — HEPARIN SODIUM 5000 UNITS: 5000 INJECTION INTRAVENOUS; SUBCUTANEOUS at 21:04

## 2023-02-18 RX ADMIN — PANTOPRAZOLE SODIUM 40 MG: 40 TABLET, DELAYED RELEASE ORAL at 05:23

## 2023-02-18 RX ADMIN — MIRTAZAPINE 7.5 MG: 15 TABLET, FILM COATED ORAL at 21:04

## 2023-02-18 RX ADMIN — MIDODRINE HYDROCHLORIDE 10 MG: 5 TABLET ORAL at 05:23

## 2023-02-18 RX ADMIN — SUCRALFATE 1 G: 1 TABLET ORAL at 18:32

## 2023-02-18 RX ADMIN — MAGNESIUM OXIDE 400 MG: 400 TABLET ORAL at 15:24

## 2023-02-18 RX ADMIN — MONTELUKAST SODIUM 10 MG: 10 TABLET, FILM COATED ORAL at 21:04

## 2023-02-18 RX ADMIN — HEPARIN SODIUM 5000 UNITS: 5000 INJECTION INTRAVENOUS; SUBCUTANEOUS at 15:23

## 2023-02-18 RX ADMIN — BUDESONIDE 500 MCG: 0.5 SUSPENSION RESPIRATORY (INHALATION) at 04:35

## 2023-02-18 RX ADMIN — ROPINIROLE HYDROCHLORIDE 1 MG: 1 TABLET, FILM COATED ORAL at 21:04

## 2023-02-18 RX ADMIN — SERTRALINE 50 MG: 50 TABLET, FILM COATED ORAL at 15:24

## 2023-02-18 NOTE — PROGRESS NOTES
[facility] MD Carmelo Beckford MD Cesario Sams, MD Prudy Pick, BRADLEY Devlin, NUNU Alonzo CNP  Progress Note    2/18/2023    SUBJECTIVE:   1/20: Feeling little better today. Denies new complaint. Still tachypnea, though denies dyspnea no cp/palp Appetite ok ROS otherwise unremarkable    1//21 seen in her room on o2/nc bp stable weak poor po intake   2+ edema in LE     1/22 charts reviewed . On bipap    1/23 : on o2/nc . Still look hypervolemic     1/24 seen in her room comfortable o2/nc . Still with LE edema which seems to be multifactorial and will likely need to accept having some edema on board     1/25 sitting on edge of the bed working with pt . Still with considerable edema in LEs   BP stable     1/26 seen in her room reports of SOB with minimal activity , LE edema still signficant . 1/27 good UO On 5 L/o2/nc  Still with sob with activity Tachycardiac with low functional capacity     1/28: Asleep, resting and breathing comfortably on nasal cannula. Remains edematous. Ongoing fatigue and generalized weakness. 1/29: Hypotensive last evening, Bumex drip stopped, IV normal saline bolus administered to effect. Breathing little more easily though still on AVAPS. To be downgraded to CPAP shortly. Thirsty. Still markedly edematous. 1/30: Somnolent, though arousable. On CPAP. Ongoing fatigue and malaise with generalized weakness    1/31 seen in her room , skin wrinkling in LE on HFNC . BP on lower side  For thoracentesis today   Dw RN .     2/1: Seen while laying in bed, no acute distress. Tells me that she is thirsty. She is on heated high flow nasal canula. Feels that her breathing has improved slightly. Plan is for thoracentesis later this afternoon, could not be done yesterday due to elevated INR. Still with skin wrinkling to bilateral lower extremities.      2/2 1 L removed with thoracentesis   O2 requirement better on 8 L o2/nc Very weak and deconditioned   Labile BP numbers    2/2 o2/nc LE UO ok remain weak decondtioned . Edema in LE getting somewhat worse    2/4 remain with significant depdent edema deconditioned in bed     2/5: Discussed with respiratory therapist: Just put on her CPAP after having tolerated nasal cannula throughout most of the day. Remains bedbound, severely weak and debilitated. Remains massively swollen, little change in the last 5 days. 2/6: Status quo. Somnolent but arousable. Desats and dyspneic without AVAPS. Remains massively edematous    2/7:  resp status is status quo. More awake, alert, interactive today. Resumed bumex gtt yesterday. 2/8 for dialysis line in am   Alert oriented   Remain markedly hypervolemic     2/9: Sitting up in bed, on Bipap. Family is present at bedside. Markedly edematous. She is asking me if [facility] placement will hurt. She reports dyspnea, but feels better on Bipap. She denies chest pain or palpitations. Plan is for [facility] catheter placement with IR today. 2/10: Seen while on HD. Tolerating without complications. Tells me she is feeling much better. Still edematous, but edema has improved substantially. She is on nasal canula, eating breakfast. She had 2.7 L removed yesterday and 3L removed today. 2/11: Dialysis today notable for hypotension and only 2.1 L UF. Currently asleep, denies new complaint. Ongoing fatigue, malaise, generalized weakness. 2/12: Somewhat less anxious than she had been over the past several days. Did tolerate roughly 3 hours off of CPAP consecutively yesterday. Still mostly using CPAP throughout the day. Swelling better. 2/13: Seen while in dialysis. Tolerating ultrafiltration without complications. Plan is to remove 3L today. Blood pressure is stable. She is on nasal canula. She tells me that she goes back and forth between nasal canula and CPAP. Plan is for a right sided pleurix catheter to be placed later today.      2/14: Seen while in dialysis. Tolerating treatment without complication. Blood pressure is stable. She is on nasal canula. Tells me she continues to feel better each day. Still with significant abdominal swelling. 2/15:  Laying in bed on BiPap. She has mild shortness of breath. BP remains low. She denies chest pain. Appetite is fair. 2/16: Seen while on hemodialysis. Tolerating treatment without complication. Blood pressure has improved and is stable. She denies dizziness, chest pain, dyspnea, or palpitations. Tells me she is tolerating being off the BIPAP for longer periods. Appetite is good. 2/17 swelling improved   Clinically better  O2 requirement better  Remain weak and deconditioned and basically bedbound     2/18 seen in her HD today with no reported issues o2 requirment continue to improve edema better   Need to work with pt/ot . She is basically bedbound     PROBLEM LIST:    Principal Problem:    Acute on chronic respiratory failure with hypoxia and hypercapnia (HCC)  Active Problems:    Palliative care encounter  Resolved Problems:    * No resolved hospital problems. *         DIET:    ADULT DIET; Regular; 3 carb choices (45 gm/meal); Low Fat/Low Chol/High Fiber/2 gm Na; Moderately Thick (Honey); 1200 ml  ADULT ORAL NUTRITION SUPPLEMENT; Lunch, Dinner;  Other Oral Supplement; Gelatein 20     MEDS (scheduled):    albuterol  2.5 mg Nebulization 4x daily    And    ipratropium  0.5 mg Nebulization 4x daily    ferric gluconate (FERRLECIT) IVPB  125 mg IntraVENous Daily    magnesium oxide  400 mg Oral Daily    midodrine  10 mg Oral TID WC    heparin (porcine)  5,000 Units SubCUTAneous 3 times per day    metoprolol succinate  25 mg Oral Daily    [Held by provider] digoxin  125 mcg IntraVENous Daily    sucralfate  1 g Oral 4x Daily    rOPINIRole  1 mg Oral TID    atorvastatin  20 mg Oral Nightly    [Held by provider] insulin glargine  13 Units SubCUTAneous BID    [Held by provider] LORazepam  0.5 mg Oral Nightly insulin lispro  0-4 Units SubCUTAneous TID     insulin lispro  0-4 Units SubCUTAneous Nightly    budesonide  0.5 mg Nebulization BID    sodium chloride flush  10 mL IntraVENous 2 times per day    miconazole   Topical BID    arformoterol tartrate  15 mcg Nebulization BID    pantoprazole  40 mg Oral QAM AC    [Held by provider] apixaban  5 mg Oral BID    mirtazapine  7.5 mg Oral Nightly    carbidopa-levodopa  1 tablet Oral TID    sertraline  50 mg Oral Daily    montelukast  10 mg Oral Nightly       MEDS (infusions):   dextrose      sodium chloride 25 mL/hr at 02/04/23 0222       MEDS (prn):  glucose, dextrose bolus **OR** dextrose bolus, glucagon (rDNA), dextrose, sodium chloride, loperamide, sodium chloride flush, sodium chloride, promethazine **OR** ondansetron, polyethylene glycol, acetaminophen **OR** acetaminophen, glucagon (rDNA)    PHYSICAL EXAM:     Patient Vitals for the past 24 hrs:   BP Temp Temp src Pulse Resp SpO2   02/18/23 1318 133/69 98.1 °F (36.7 °C) -- 67 18 --   02/18/23 1300 136/79 -- -- 66 -- --   02/18/23 1228 (!) 104/58 -- -- -- -- --   02/18/23 1158 127/86 -- -- 71 -- --   02/18/23 1130 (!) 100/55 -- -- 54 -- --   02/18/23 1100 111/61 -- -- (!) 45 -- --   02/18/23 1028 (!) 109/54 -- -- 56 -- --   02/18/23 1000 122/83 -- -- 77 -- --   02/18/23 0935 (!) 90/43 -- -- 86 -- --   02/18/23 0920 (!) 100/54 98.1 °F (36.7 °C) -- 78 20 --   02/18/23 0900 (!) 103/43 98.1 °F (36.7 °C) Axillary 55 -- 98 %   02/18/23 0423 95/60 98 °F (36.7 °C) Axillary 57 22 98 %   02/18/23 0118 -- -- -- -- 22 --   02/17/23 2336 (!) 98/52 98.4 °F (36.9 °C) Axillary 56 20 100 %   02/17/23 2126 -- -- -- -- 23 --   02/17/23 1644 (!) 101/46 97.7 °F (36.5 °C) Axillary 61 16 98 %     @      Intake/Output Summary (Last 24 hours) at 2/18/2023 1453  Last data filed at 2/18/2023 1318  Gross per 24 hour   Intake 620 ml   Output 3200 ml   Net -2580 ml           Wt Readings from Last 3 Encounters:   02/16/23 216 lb 0.8 oz (98 kg) 01/16/23 259 lb (117.5 kg)   01/16/23 259 lb (117.5 kg)     Age-appropriate morbidly obese white woman, though easily arousable, no apparent distress  NC/AT EOMI sclera conjunctive are clear and anicteric mucous membranes are moist  Neck soft supple trachea midline  Lungs:  Diminished bilaterally. Poor inspiratory effort. Regular rhythm normal S1 and S2  Abdomen soft nontender nondistended normal active bowel sounds. Abdominal pannus has substantial edema (+3 pitting)  Distal extremities, thighs, sacrum have substantial chronic type nonpitting edema, +1/trace BLE edema. Pulses 1+ x4  Moves all 4 extremities  Cranial nerves II through XII grossly intact  Awake, alert, interactive and appropriate  Skin tone consistent with chronic venous stasis distally      DATA:    Recent Labs     02/16/23  0743 02/17/23  0920 02/18/23  0930   WBC 7.3 10.3 7.9   HGB 7.7* 8.1* 7.6*   HCT 27.4* 28.8* 28.4*   MCV 89.8 91.4 93.7    189 174           Recent Labs     02/16/23  0743 02/16/23  1712 02/17/23  0920 02/18/23  0930     --  138 137   K 3.7  --  4.7 3.7   CL 98  --  98 97*   CO2 29  --  26 30*   MG  --   --  1.7  --    PHOS  --  1.1* 2.6  --    BUN 31*  --  18 24*   CREATININE 3.5*  --  2.6* 3.2*   ALT <5  --  <5 <5   AST 7  --  12 6   BILITOT 0.6  --  0.6 0.5   ALKPHOS 50  --  53 55         Lab Results   Component Value Date    LABPROT 0.5 (H) 01/18/2023    LABPROT 0.5 01/18/2023     On CT no hydro/signs of obstruction     Urine studies  U Na 21, U Cl 23 U prot:cr ratio 0.5    ASSESSMENT / RECOMMENDATIONS:       Assessment  1. Acute kidney injury urine lytes support decrease effective volume     2. CKD stage III, Baseline creatinine 1.4 to 1.7 mg/dL, secondary to diabetic nephropathy and renal microvascular atherosclerotic disease. 0.5 g proteinuria     3. Anemia due to CKD, phlebotomy associated prolonged hospitalization     4.   Acute hypercapnic and hypoxic respiratory failure due to COPD exacerbation and pneumonia. Does not appear hypervolemic. 5.  Morbid obesity, BMI 50.6 kg per metered square     6. Edema/anasarca, chronic, multifactorial, due to venous insufficiency in the setting of morbid obesity, relative immobility, likely diastolic dysfunction though it was \"indeterminate\" on echo, the does have pulmonary hypertension, mild right-sided heart failure    7. Hypotension    8.   Elevated bicarbonate represents compensation for chronic respiratory acidosis, as well as likely contraction alkalosis due to diuresis on the Bumex drip           Recommendations  -continue Renal replacement therapy support for UF and solute purposes  -place on HD schedule MWF   -Continue Midodrine 10 mg TID   -PT/OT   -SW for HD unit placement      Electronically signed by Lesia Monreal MD on 2/18/2023

## 2023-02-18 NOTE — PROGRESS NOTES
[facility] MD oBb Gardiner MD Carlean Reed, MD Sabrina Revere, BRADLEY Devlin, NUNU Terrell, BRADLEY  Progress Note    2/17/2023    SUBJECTIVE:   1/20: Feeling little better today. Denies new complaint. Still tachypnea, though denies dyspnea no cp/palp Appetite ok ROS otherwise unremarkable    1//21 seen in her room on o2/nc bp stable weak poor po intake   2+ edema in LE     1/22 charts reviewed . On bipap    1/23 : on o2/nc . Still look hypervolemic     1/24 seen in her room comfortable o2/nc . Still with LE edema which seems to be multifactorial and will likely need to accept having some edema on board     1/25 sitting on edge of the bed working with pt . Still with considerable edema in LEs   BP stable     1/26 seen in her room reports of SOB with minimal activity , LE edema still signficant . 1/27 good UO On 5 L/o2/nc  Still with sob with activity Tachycardiac with low functional capacity     1/28: Asleep, resting and breathing comfortably on nasal cannula. Remains edematous. Ongoing fatigue and generalized weakness. 1/29: Hypotensive last evening, Bumex drip stopped, IV normal saline bolus administered to effect. Breathing little more easily though still on AVAPS. To be downgraded to CPAP shortly. Thirsty. Still markedly edematous. 1/30: Somnolent, though arousable. On CPAP. Ongoing fatigue and malaise with generalized weakness    1/31 seen in her room , skin wrinkling in LE on HFNC . BP on lower side  For thoracentesis today   Dw RN .     2/1: Seen while laying in bed, no acute distress. Tells me that she is thirsty. She is on heated high flow nasal canula. Feels that her breathing has improved slightly. Plan is for thoracentesis later this afternoon, could not be done yesterday due to elevated INR. Still with skin wrinkling to bilateral lower extremities.      2/2 1 L removed with thoracentesis   O2 requirement better on 8 L o2/nc Very weak and deconditioned   Labile BP numbers    2/2 o2/nc LE UO ok remain weak decondtioned . Edema in LE getting somewhat worse    2/4 remain with significant depdent edema deconditioned in bed     2/5: Discussed with respiratory therapist: Just put on her CPAP after having tolerated nasal cannula throughout most of the day. Remains bedbound, severely weak and debilitated. Remains massively swollen, little change in the last 5 days. 2/6: Status quo. Somnolent but arousable. Desats and dyspneic without AVAPS. Remains massively edematous    2/7:  resp status is status quo. More awake, alert, interactive today. Resumed bumex gtt yesterday. 2/8 for dialysis line in am   Alert oriented   Remain markedly hypervolemic     2/9: Sitting up in bed, on Bipap. Family is present at bedside. Markedly edematous. She is asking me if [facility] placement will hurt. She reports dyspnea, but feels better on Bipap. She denies chest pain or palpitations. Plan is for [facility] catheter placement with IR today. 2/10: Seen while on HD. Tolerating without complications. Tells me she is feeling much better. Still edematous, but edema has improved substantially. She is on nasal canula, eating breakfast. She had 2.7 L removed yesterday and 3L removed today. 2/11: Dialysis today notable for hypotension and only 2.1 L UF. Currently asleep, denies new complaint. Ongoing fatigue, malaise, generalized weakness. 2/12: Somewhat less anxious than she had been over the past several days. Did tolerate roughly 3 hours off of CPAP consecutively yesterday. Still mostly using CPAP throughout the day. Swelling better. 2/13: Seen while in dialysis. Tolerating ultrafiltration without complications. Plan is to remove 3L today. Blood pressure is stable. She is on nasal canula. She tells me that she goes back and forth between nasal canula and CPAP. Plan is for a right sided pleurix catheter to be placed later today.      2/14: Seen while in dialysis. Tolerating treatment without complication. Blood pressure is stable. She is on nasal canula. Tells me she continues to feel better each day. Still with significant abdominal swelling. 2/15:  Laying in bed on BiPap. She has mild shortness of breath. BP remains low. She denies chest pain. Appetite is fair. 2/16: Seen while on hemodialysis. Tolerating treatment without complication. Blood pressure has improved and is stable. She denies dizziness, chest pain, dyspnea, or palpitations. Tells me she is tolerating being off the BIPAP for longer periods. Appetite is good. 2/17 swelling improved   Clinically better  O2 requirement better  Remain weak and deconditioned and basically bedbound     PROBLEM LIST:    Principal Problem:    Acute on chronic respiratory failure with hypoxia and hypercapnia (HCC)  Active Problems:    Palliative care encounter  Resolved Problems:    * No resolved hospital problems. *         DIET:    ADULT DIET; Regular; 3 carb choices (45 gm/meal); Low Fat/Low Chol/High Fiber/2 gm Na; Moderately Thick (Honey); 1200 ml  ADULT ORAL NUTRITION SUPPLEMENT; Lunch, Dinner;  Other Oral Supplement; Gelatein 20     MEDS (scheduled):    albuterol  2.5 mg Nebulization 4x daily    And    ipratropium  0.5 mg Nebulization 4x daily    ferric gluconate (FERRLECIT) IVPB  125 mg IntraVENous Daily    magnesium oxide  400 mg Oral Daily    midodrine  10 mg Oral TID     heparin (porcine)  5,000 Units SubCUTAneous 3 times per day    metoprolol succinate  25 mg Oral Daily    digoxin  125 mcg IntraVENous Daily    sucralfate  1 g Oral 4x Daily    rOPINIRole  1 mg Oral TID    atorvastatin  20 mg Oral Nightly    [Held by provider] insulin glargine  13 Units SubCUTAneous BID    [Held by provider] LORazepam  0.5 mg Oral Nightly    insulin lispro  0-4 Units SubCUTAneous TID WC    insulin lispro  0-4 Units SubCUTAneous Nightly    budesonide  0.5 mg Nebulization BID    sodium chloride flush  10 mL IntraVENous 2 times per day    miconazole   Topical BID    arformoterol tartrate  15 mcg Nebulization BID    pantoprazole  40 mg Oral QAM AC    [Held by provider] apixaban  5 mg Oral BID    mirtazapine  7.5 mg Oral Nightly    carbidopa-levodopa  1 tablet Oral TID    sertraline  50 mg Oral Daily    montelukast  10 mg Oral Nightly       MEDS (infusions):   dextrose      sodium chloride 25 mL/hr at 02/04/23 0222       MEDS (prn):  glucose, dextrose bolus **OR** dextrose bolus, glucagon (rDNA), dextrose, sodium chloride, loperamide, sodium chloride flush, sodium chloride, promethazine **OR** ondansetron, polyethylene glycol, acetaminophen **OR** acetaminophen, glucagon (rDNA)    PHYSICAL EXAM:     Patient Vitals for the past 24 hrs:   BP Temp Temp src Pulse Resp SpO2   02/17/23 2126 -- -- -- -- 23 --   02/17/23 1644 (!) 101/46 97.7 °F (36.5 °C) Axillary 61 16 98 %   02/17/23 1330 101/65 -- -- -- -- --   02/17/23 0554 (!) 104/59 98.2 °F (36.8 °C) Oral 63 20 98 %     @      Intake/Output Summary (Last 24 hours) at 2/17/2023 2336  Last data filed at 2/17/2023 0900  Gross per 24 hour   Intake 240 ml   Output --   Net 240 ml           Wt Readings from Last 3 Encounters:   02/16/23 216 lb 0.8 oz (98 kg)   01/16/23 259 lb (117.5 kg)   01/16/23 259 lb (117.5 kg)     Age-appropriate morbidly obese white woman, though easily arousable, no apparent distress  NC/AT EOMI sclera conjunctive are clear and anicteric mucous membranes are moist  Neck soft supple trachea midline  Lungs:  Diminished bilaterally. Poor inspiratory effort. Regular rhythm normal S1 and S2  Abdomen soft nontender nondistended normal active bowel sounds. Abdominal pannus has substantial edema (+3 pitting)  Distal extremities, thighs, sacrum have substantial chronic type nonpitting edema, +1/trace BLE edema.    Pulses 1+ x4  Moves all 4 extremities  Cranial nerves II through XII grossly intact  Awake, alert, interactive and appropriate  Skin tone consistent with chronic venous stasis distally      DATA:    Recent Labs     02/16/23  0743 02/17/23  0920   WBC 7.3 10.3   HGB 7.7* 8.1*   HCT 27.4* 28.8*   MCV 89.8 91.4    189           Recent Labs     02/15/23  0848 02/16/23  0743 02/16/23  1712 02/17/23  0920    136  --  138   K 4.1 3.7  --  4.7   CL 98 98  --  98   CO2 28 29  --  26   MG 1.6  --   --  1.7   PHOS  --   --  1.1* 2.6   BUN 27* 31*  --  18   CREATININE 3.2* 3.5*  --  2.6*   ALT <5 <5  --  <5   AST <5 7  --  12   BILITOT 0.8 0.6  --  0.6   ALKPHOS 48 50  --  53         Lab Results   Component Value Date    LABPROT 0.5 (H) 01/18/2023    LABPROT 0.5 01/18/2023     On CT no hydro/signs of obstruction     Urine studies  U Na 21, U Cl 23 U prot:cr ratio 0.5    ASSESSMENT / RECOMMENDATIONS:       Assessment  1. Acute kidney injury urine lytes support decrease effective volume     2. CKD stage III, Baseline creatinine 1.4 to 1.7 mg/dL, secondary to diabetic nephropathy and renal microvascular atherosclerotic disease. 0.5 g proteinuria     3. Anemia due to CKD, phlebotomy associated prolonged hospitalization     4. Acute hypercapnic and hypoxic respiratory failure due to COPD exacerbation and pneumonia. Does not appear hypervolemic. 5.  Morbid obesity, BMI 50.6 kg per metered square     6. Edema/anasarca, chronic, multifactorial, due to venous insufficiency in the setting of morbid obesity, relative immobility, likely diastolic dysfunction though it was \"indeterminate\" on echo, the does have pulmonary hypertension, mild right-sided heart failure    7. Hypotension    8. Elevated bicarbonate represents compensation for chronic respiratory acidosis, as well as likely contraction alkalosis due to diuresis on the Bumex drip     Remains hypervolemic, particular in her abdominal pannus extremities much relieved  Chest xray-large right pleural effusion   Tolerating hemodialysis without complications. Edema has improved substantially.      2.7 L removed 2/9  3 L removed 2/10  2.1 L removed 2/11 2/13 -removed 2800  2/14- removed 3200  Right Pleurix catheter in place  BP improved   Iron-33  Iron sat-12   Ferritin-60  Hemoglobin-7.7     Recommendations  -continue Renal replacement therapy support for UF and solute purposes  - IV Ferrlecit 125 mg x4 doses   -Mag Oxide po 400 mg daily  -Continue Midodrine 10 mg TID   -Monitor labs  -Monitor I & O  Continue supportive care      Electronically signed by Jeffrey Bauer MD on 2/17/2023

## 2023-02-18 NOTE — PROGRESS NOTES
Department of Internal Medicine  General Internal Medicine  Attending Progress Note  Chief Complaint   Patient presents with    Respiratory Distress     Normally on 3L NC, came in on CPAP, given total of 50mcg push dose epi for low BP by EMS     No complaints.   Seen in hd  She has been happy with her weight / fluid loss  OBJECTIVE      Medications    Current Facility-Administered Medications: albuterol (PROVENTIL) nebulizer solution 2.5 mg, 2.5 mg, Nebulization, 4x daily **AND** ipratropium (ATROVENT) 0.02 % nebulizer solution 0.5 mg, 0.5 mg, Nebulization, 4x daily  glucose chewable tablet 16 g, 4 tablet, Oral, PRN  dextrose bolus 10% 125 mL, 125 mL, IntraVENous, PRN **OR** dextrose bolus 10% 250 mL, 250 mL, IntraVENous, PRN  glucagon (rDNA) injection 1 mg, 1 mg, SubCUTAneous, PRN  dextrose 10 % infusion, , IntraVENous, Continuous PRN  ferric gluconate (FERRLECIT) 125 mg in sodium chloride 0.9 % 100 mL IVPB, 125 mg, IntraVENous, Daily  magnesium oxide (MAG-OX) tablet 400 mg, 400 mg, Oral, Daily  midodrine (PROAMATINE) tablet 10 mg, 10 mg, Oral, TID WC  heparin (porcine) injection 5,000 Units, 5,000 Units, SubCUTAneous, 3 times per day  metoprolol succinate (TOPROL XL) extended release tablet 25 mg, 25 mg, Oral, Daily  digoxin (LANOXIN) injection 125 mcg, 125 mcg, IntraVENous, Daily  sodium chloride (OCEAN, BABY AYR) 0.65 % nasal spray 1 spray, 1 spray, Each Nostril, PRN  sucralfate (CARAFATE) tablet 1 g, 1 g, Oral, 4x Daily  rOPINIRole (REQUIP) tablet 1 mg, 1 mg, Oral, TID  atorvastatin (LIPITOR) tablet 20 mg, 20 mg, Oral, Nightly  [Held by provider] insulin glargine (LANTUS) injection vial 13 Units, 13 Units, SubCUTAneous, BID  loperamide (IMODIUM) capsule 2 mg, 2 mg, Oral, 4x Daily PRN  [Held by provider] LORazepam (ATIVAN) tablet 0.5 mg, 0.5 mg, Oral, Nightly  insulin lispro (HUMALOG) injection vial 0-4 Units, 0-4 Units, SubCUTAneous, TID WC  insulin lispro (HUMALOG) injection vial 0-4 Units, 0-4 Units, SubCUTAneous, Nightly  budesonide (PULMICORT) nebulizer suspension 500 mcg, 0.5 mg, Nebulization, BID  sodium chloride flush 0.9 % injection 10 mL, 10 mL, IntraVENous, 2 times per day  sodium chloride flush 0.9 % injection 10 mL, 10 mL, IntraVENous, PRN  0.9 % sodium chloride infusion, , IntraVENous, PRN  promethazine (PHENERGAN) tablet 12.5 mg, 12.5 mg, Oral, Q6H PRN **OR** ondansetron (ZOFRAN) injection 4 mg, 4 mg, IntraVENous, Q6H PRN  polyethylene glycol (GLYCOLAX) packet 17 g, 17 g, Oral, Daily PRN  acetaminophen (TYLENOL) tablet 650 mg, 650 mg, Oral, Q6H PRN **OR** acetaminophen (TYLENOL) suppository 650 mg, 650 mg, Rectal, Q6H PRN  miconazole (MICOTIN) 2 % powder, , Topical, BID  arformoterol tartrate (BROVANA) nebulizer solution 15 mcg, 15 mcg, Nebulization, BID  pantoprazole (PROTONIX) tablet 40 mg, 40 mg, Oral, QAM AC  glucagon (rDNA) injection 1 mg, 1 mg, SubCUTAneous, PRN  [Held by provider] apixaban (ELIQUIS) tablet 5 mg, 5 mg, Oral, BID  mirtazapine (REMERON) tablet 7.5 mg, 7.5 mg, Oral, Nightly  carbidopa-levodopa (SINEMET CR)  MG per extended release tablet 1 tablet, 1 tablet, Oral, TID  sertraline (ZOLOFT) tablet 50 mg, 50 mg, Oral, Daily  montelukast (SINGULAIR) tablet 10 mg, 10 mg, Oral, Nightly  Physical    VITALS:  BP (!) 104/58   Pulse 71   Temp 98.1 °F (36.7 °C)   Resp 20   Ht 5' (1.524 m)   Wt 216 lb 0.8 oz (98 kg)   SpO2 98%   BMI 42.19 kg/m²   No acute distress moist membranes   Normocephalic, without obvious abnormality, atraumatic, external ears without lesions,   Neck supple no cervical lymphadenopathy  Heart has a regular rate and rhythm no murmur  Lungs are clear to auscultation bilaterally with equal movements. Anasarca still present  good peripheral perfusion. No significant rashes or new skin lesions. No new focality on neuro exam which is overall grossly intact.   Affect and mood seem to be appropriate     Data    CBC:   Lab Results   Component Value Date/Time    WBC 7.9 02/18/2023 09:30 AM    RBC 3.03 02/18/2023 09:30 AM    HGB 7.6 02/18/2023 09:30 AM    HCT 28.4 02/18/2023 09:30 AM    MCV 93.7 02/18/2023 09:30 AM    MCH 25.1 02/18/2023 09:30 AM    MCHC 26.8 02/18/2023 09:30 AM    RDW 20.1 02/18/2023 09:30 AM     02/18/2023 09:30 AM    MPV 11.9 02/18/2023 09:30 AM     BMP:    Lab Results   Component Value Date/Time     02/18/2023 09:30 AM    K 3.7 02/18/2023 09:30 AM    CL 97 02/18/2023 09:30 AM    CO2 30 02/18/2023 09:30 AM    BUN 24 02/18/2023 09:30 AM    LABALBU 4.0 02/18/2023 09:30 AM    CREATININE 3.2 02/18/2023 09:30 AM    CALCIUM 8.4 02/18/2023 09:30 AM    GFRAA >60 10/16/2022 09:20 AM    LABGLOM 15 02/18/2023 09:30 AM    GLUCOSE 105 02/18/2023 09:30 AM       ASSESSMENT AND PLAN    Patient was admitted with dyspnea and hypotension. She was found to be in fluid overload. She was diuresed with minimal improvement. She was treated with Bumex drip. Patient reluctantly agreed to dialysis. She is slowly improving with dialysis. However, pleural effusion still persistent. Acute on chronic respiratory failure with hypoxia and hypercapnia: breathing treatment . bipap prn. O2 as much as needs  CKD stage 3 now on dialysis for volume overload. Volume overload is improving but still anasarca. Will give HD / ultrafiltration few more days before moving forward with discharge plans. DM type 2 complicated with nephropathy  Hypotension: improved  Anemia due to CKD: hgb is stable. EVeda Brianna UTI: completed Zyvox  Parkinson's disease: overall stable. Continue home meds  Sleep Apnea: continue bipap as ordered. Pleural Effusion: per Pulm. DVT prophylaxis: Eliquis held for pleurex placement on 2/14  Full code. Palliative care encounter. POA is dtr [employer].   There is a code for family members to call in. RN staff continues to report problematic interactions with patient's children  Disposition:  by 2/21 give or take a day Renal will help determine usefulness of current course.   Th hope is to off load her anasarca enough to improve respiratory status and make her stable enough for ecf  see note from 2/17  2

## 2023-02-18 NOTE — PROGRESS NOTES
met with patient and discussed length of time in hospital.  Patient stated she is fighting to get better.  provided a listening presence, scripture, and prayer. Patient was appreciative of the visit and prayer. Spiritual care is ongoing.

## 2023-02-19 LAB
ALBUMIN SERPL-MCNC: 4 G/DL (ref 3.5–5.2)
ALP BLD-CCNC: 59 U/L (ref 35–104)
ALT SERPL-CCNC: <5 U/L (ref 0–32)
ANION GAP SERPL CALCULATED.3IONS-SCNC: 9 MMOL/L (ref 7–16)
ANISOCYTOSIS: ABNORMAL
AST SERPL-CCNC: 14 U/L (ref 0–31)
BASOPHILS ABSOLUTE: 0 E9/L (ref 0–0.2)
BASOPHILS RELATIVE PERCENT: 0.8 % (ref 0–2)
BILIRUB SERPL-MCNC: 0.8 MG/DL (ref 0–1.2)
BUN BLDV-MCNC: 17 MG/DL (ref 6–23)
CALCIUM SERPL-MCNC: 8.3 MG/DL (ref 8.6–10.2)
CHLORIDE BLD-SCNC: 95 MMOL/L (ref 98–107)
CO2: 31 MMOL/L (ref 22–29)
CREAT SERPL-MCNC: 2.5 MG/DL (ref 0.5–1)
EOSINOPHILS ABSOLUTE: 0.14 E9/L (ref 0.05–0.5)
EOSINOPHILS RELATIVE PERCENT: 1.8 % (ref 0–6)
GFR SERPL CREATININE-BSD FRML MDRD: 20 ML/MIN/1.73
GLUCOSE BLD-MCNC: 110 MG/DL (ref 74–99)
HCT VFR BLD CALC: 29.7 % (ref 34–48)
HEMOGLOBIN: 8.1 G/DL (ref 11.5–15.5)
HYPOCHROMIA: ABNORMAL
LYMPHOCYTES ABSOLUTE: 1.68 E9/L (ref 1.5–4)
LYMPHOCYTES RELATIVE PERCENT: 21.1 % (ref 20–42)
MCH RBC QN AUTO: 24.6 PG (ref 26–35)
MCHC RBC AUTO-ENTMCNC: 27.3 % (ref 32–34.5)
MCV RBC AUTO: 90.3 FL (ref 80–99.9)
METAMYELOCYTES RELATIVE PERCENT: 0.9 % (ref 0–1)
METER GLUCOSE: 104 MG/DL (ref 74–99)
METER GLUCOSE: 115 MG/DL (ref 74–99)
METER GLUCOSE: 121 MG/DL (ref 74–99)
METER GLUCOSE: 124 MG/DL (ref 74–99)
MONOCYTES ABSOLUTE: 0.24 E9/L (ref 0.1–0.95)
MONOCYTES RELATIVE PERCENT: 2.6 % (ref 2–12)
NEUTROPHILS ABSOLUTE: 6 E9/L (ref 1.8–7.3)
NEUTROPHILS RELATIVE PERCENT: 73.7 % (ref 43–80)
NUCLEATED RED BLOOD CELLS: 1.8 /100 WBC
PDW BLD-RTO: 19.6 FL (ref 11.5–15)
PLATELET # BLD: 134 E9/L (ref 130–450)
PMV BLD AUTO: 12.4 FL (ref 7–12)
POLYCHROMASIA: ABNORMAL
POTASSIUM REFLEX MAGNESIUM: 4 MMOL/L (ref 3.5–5)
RBC # BLD: 3.29 E12/L (ref 3.5–5.5)
SODIUM BLD-SCNC: 135 MMOL/L (ref 132–146)
TOTAL PROTEIN: 5.9 G/DL (ref 6.4–8.3)
WBC # BLD: 8 E9/L (ref 4.5–11.5)

## 2023-02-19 PROCEDURE — 85025 COMPLETE CBC W/AUTO DIFF WBC: CPT

## 2023-02-19 PROCEDURE — 6360000002 HC RX W HCPCS

## 2023-02-19 PROCEDURE — 6370000000 HC RX 637 (ALT 250 FOR IP): Performed by: INTERNAL MEDICINE

## 2023-02-19 PROCEDURE — 6370000000 HC RX 637 (ALT 250 FOR IP): Performed by: NURSE PRACTITIONER

## 2023-02-19 PROCEDURE — 36415 COLL VENOUS BLD VENIPUNCTURE: CPT

## 2023-02-19 PROCEDURE — 2700000000 HC OXYGEN THERAPY PER DAY

## 2023-02-19 PROCEDURE — 6360000002 HC RX W HCPCS: Performed by: NURSE PRACTITIONER

## 2023-02-19 PROCEDURE — 82962 GLUCOSE BLOOD TEST: CPT

## 2023-02-19 PROCEDURE — 2580000003 HC RX 258

## 2023-02-19 PROCEDURE — 99232 SBSQ HOSP IP/OBS MODERATE 35: CPT | Performed by: INTERNAL MEDICINE

## 2023-02-19 PROCEDURE — 80053 COMPREHEN METABOLIC PANEL: CPT

## 2023-02-19 PROCEDURE — 94640 AIRWAY INHALATION TREATMENT: CPT

## 2023-02-19 PROCEDURE — 2060000000 HC ICU INTERMEDIATE R&B

## 2023-02-19 PROCEDURE — 6360000002 HC RX W HCPCS: Performed by: INTERNAL MEDICINE

## 2023-02-19 PROCEDURE — 2580000003 HC RX 258: Performed by: INTERNAL MEDICINE

## 2023-02-19 PROCEDURE — 94660 CPAP INITIATION&MGMT: CPT

## 2023-02-19 RX ADMIN — SUCRALFATE 1 G: 1 TABLET ORAL at 18:37

## 2023-02-19 RX ADMIN — ARFORMOTEROL TARTRATE 15 MCG: 15 SOLUTION RESPIRATORY (INHALATION) at 06:00

## 2023-02-19 RX ADMIN — ARFORMOTEROL TARTRATE 15 MCG: 15 SOLUTION RESPIRATORY (INHALATION) at 18:04

## 2023-02-19 RX ADMIN — MAGNESIUM OXIDE 400 MG: 400 TABLET ORAL at 09:07

## 2023-02-19 RX ADMIN — BUDESONIDE 500 MCG: 0.5 SUSPENSION RESPIRATORY (INHALATION) at 18:04

## 2023-02-19 RX ADMIN — SERTRALINE 50 MG: 50 TABLET, FILM COATED ORAL at 09:07

## 2023-02-19 RX ADMIN — ANTI-FUNGAL POWDER MICONAZOLE NITRATE TALC FREE: 1.42 POWDER TOPICAL at 20:48

## 2023-02-19 RX ADMIN — ANTI-FUNGAL POWDER MICONAZOLE NITRATE TALC FREE: 1.42 POWDER TOPICAL at 09:10

## 2023-02-19 RX ADMIN — HEPARIN SODIUM 5000 UNITS: 5000 INJECTION INTRAVENOUS; SUBCUTANEOUS at 20:49

## 2023-02-19 RX ADMIN — IPRATROPIUM BROMIDE 0.5 MG: 0.5 SOLUTION RESPIRATORY (INHALATION) at 18:04

## 2023-02-19 RX ADMIN — IPRATROPIUM BROMIDE 0.5 MG: 0.5 SOLUTION RESPIRATORY (INHALATION) at 09:37

## 2023-02-19 RX ADMIN — IPRATROPIUM BROMIDE 0.5 MG: 0.5 SOLUTION RESPIRATORY (INHALATION) at 13:35

## 2023-02-19 RX ADMIN — ALBUTEROL SULFATE 2.5 MG: 2.5 SOLUTION RESPIRATORY (INHALATION) at 06:00

## 2023-02-19 RX ADMIN — MIRTAZAPINE 7.5 MG: 15 TABLET, FILM COATED ORAL at 20:47

## 2023-02-19 RX ADMIN — Medication 10 ML: at 20:49

## 2023-02-19 RX ADMIN — ALBUTEROL SULFATE 2.5 MG: 2.5 SOLUTION RESPIRATORY (INHALATION) at 09:37

## 2023-02-19 RX ADMIN — CARBIDOPA AND LEVODOPA 1 TABLET: 50; 200 TABLET, EXTENDED RELEASE ORAL at 09:07

## 2023-02-19 RX ADMIN — IPRATROPIUM BROMIDE 0.5 MG: 0.5 SOLUTION RESPIRATORY (INHALATION) at 06:00

## 2023-02-19 RX ADMIN — HEPARIN SODIUM 5000 UNITS: 5000 INJECTION INTRAVENOUS; SUBCUTANEOUS at 15:16

## 2023-02-19 RX ADMIN — CARBIDOPA AND LEVODOPA 1 TABLET: 50; 200 TABLET, EXTENDED RELEASE ORAL at 15:16

## 2023-02-19 RX ADMIN — MIDODRINE HYDROCHLORIDE 10 MG: 5 TABLET ORAL at 18:37

## 2023-02-19 RX ADMIN — PANTOPRAZOLE SODIUM 40 MG: 40 TABLET, DELAYED RELEASE ORAL at 05:27

## 2023-02-19 RX ADMIN — MIDODRINE HYDROCHLORIDE 10 MG: 5 TABLET ORAL at 13:12

## 2023-02-19 RX ADMIN — METOPROLOL SUCCINATE 25 MG: 25 TABLET, FILM COATED, EXTENDED RELEASE ORAL at 09:07

## 2023-02-19 RX ADMIN — MIDODRINE HYDROCHLORIDE 10 MG: 5 TABLET ORAL at 05:27

## 2023-02-19 RX ADMIN — ROPINIROLE HYDROCHLORIDE 1 MG: 1 TABLET, FILM COATED ORAL at 20:48

## 2023-02-19 RX ADMIN — ROPINIROLE HYDROCHLORIDE 1 MG: 1 TABLET, FILM COATED ORAL at 15:16

## 2023-02-19 RX ADMIN — ACETAMINOPHEN 650 MG: 325 TABLET ORAL at 15:16

## 2023-02-19 RX ADMIN — ALBUTEROL SULFATE 2.5 MG: 2.5 SOLUTION RESPIRATORY (INHALATION) at 13:35

## 2023-02-19 RX ADMIN — MONTELUKAST SODIUM 10 MG: 10 TABLET, FILM COATED ORAL at 20:48

## 2023-02-19 RX ADMIN — SUCRALFATE 1 G: 1 TABLET ORAL at 09:07

## 2023-02-19 RX ADMIN — HEPARIN SODIUM 5000 UNITS: 5000 INJECTION INTRAVENOUS; SUBCUTANEOUS at 05:27

## 2023-02-19 RX ADMIN — ATORVASTATIN CALCIUM 20 MG: 20 TABLET, FILM COATED ORAL at 20:48

## 2023-02-19 RX ADMIN — SODIUM CHLORIDE 125 MG: 9 INJECTION, SOLUTION INTRAVENOUS at 11:02

## 2023-02-19 RX ADMIN — SUCRALFATE 1 G: 1 TABLET ORAL at 13:12

## 2023-02-19 RX ADMIN — CARBIDOPA AND LEVODOPA 1 TABLET: 50; 200 TABLET, EXTENDED RELEASE ORAL at 20:48

## 2023-02-19 RX ADMIN — SUCRALFATE 1 G: 1 TABLET ORAL at 20:47

## 2023-02-19 RX ADMIN — Medication 10 ML: at 09:10

## 2023-02-19 RX ADMIN — ALBUTEROL SULFATE 2.5 MG: 2.5 SOLUTION RESPIRATORY (INHALATION) at 18:04

## 2023-02-19 RX ADMIN — BUDESONIDE 500 MCG: 0.5 SUSPENSION RESPIRATORY (INHALATION) at 06:00

## 2023-02-19 RX ADMIN — ROPINIROLE HYDROCHLORIDE 1 MG: 1 TABLET, FILM COATED ORAL at 09:07

## 2023-02-19 ASSESSMENT — PAIN DESCRIPTION - LOCATION: LOCATION: BACK

## 2023-02-19 ASSESSMENT — PAIN SCALES - GENERAL
PAINLEVEL_OUTOF10: 5
PAINLEVEL_OUTOF10: 0

## 2023-02-19 NOTE — PLAN OF CARE
Problem: Discharge Planning  Goal: Discharge to home or other facility with appropriate resources  2/19/2023 0328 by Louella Hamman, RN  Outcome: Progressing     Problem: Respiratory - Adult  Goal: Achieves optimal ventilation and oxygenation  2/19/2023 0328 by Louella Hamman, RN  Outcome: Progressing     Problem: Cardiovascular - Adult  Goal: Maintains optimal cardiac output and hemodynamic stability  2/19/2023 0328 by Louella Hamman, RN  Outcome: Progressing     Problem: Cardiovascular - Adult  Goal: Absence of cardiac dysrhythmias or at baseline  2/19/2023 0328 by Louella Hamman, RN  Outcome: Progressing     Problem: Genitourinary - Adult  Goal: Absence of urinary retention  2/19/2023 0328 by Louella Hamman, RN  Outcome: Progressing     Problem: Genitourinary - Adult  Goal: Urinary catheter remains patent  2/19/2023 0328 by Louella Hamman, RN  Outcome: Progressing     Problem: Chronic Conditions and Co-morbidities  Goal: Patient's chronic conditions and co-morbidity symptoms are monitored and maintained or improved  2/19/2023 0328 by Louella Hamman, RN  Outcome: Progressing     Problem: Skin/Tissue Integrity  Goal: Absence of new skin breakdown  Description: 1. Monitor for areas of redness and/or skin breakdown  2. Assess vascular access sites hourly  3. Every 4-6 hours minimum:  Change oxygen saturation probe site  4. Every 4-6 hours:  If on nasal continuous positive airway pressure, respiratory therapy assess nares and determine need for appliance change or resting period.   2/19/2023 0328 by Louella Hamman, RN  Outcome: Progressing     Problem: Safety - Adult  Goal: Free from fall injury  2/19/2023 0328 by Louella Hamman, RN  Outcome: Progressing     Problem: ABCDS Injury Assessment  Goal: Absence of physical injury  2/19/2023 0328 by Louella Hamman, RN  Outcome: Progressing     Problem: Pain  Goal: Verbalizes/displays adequate comfort level or baseline comfort level  2/19/2023 0328 by Louella Hamman, RN  Outcome: Progressing     Problem: Nutrition Deficit:  Goal: Optimize nutritional status  2/19/2023 0328 by Yolis Ashley RN  Outcome: Progressing

## 2023-02-19 NOTE — PROGRESS NOTES
Department of Internal Medicine  General Internal Medicine  Attending Progress Note  Chief Complaint   Patient presents with    Respiratory Distress     Normally on 3L NC, came in on CPAP, given total of 50mcg push dose epi for low BP by EMS     Shortness of breath less edema no new complaints   OBJECTIVE      Medications    Current Facility-Administered Medications: albuterol (PROVENTIL) nebulizer solution 2.5 mg, 2.5 mg, Nebulization, 4x daily **AND** ipratropium (ATROVENT) 0.02 % nebulizer solution 0.5 mg, 0.5 mg, Nebulization, 4x daily  glucose chewable tablet 16 g, 4 tablet, Oral, PRN  dextrose bolus 10% 125 mL, 125 mL, IntraVENous, PRN **OR** dextrose bolus 10% 250 mL, 250 mL, IntraVENous, PRN  glucagon (rDNA) injection 1 mg, 1 mg, SubCUTAneous, PRN  dextrose 10 % infusion, , IntraVENous, Continuous PRN  ferric gluconate (FERRLECIT) 125 mg in sodium chloride 0.9 % 100 mL IVPB, 125 mg, IntraVENous, Daily  magnesium oxide (MAG-OX) tablet 400 mg, 400 mg, Oral, Daily  midodrine (PROAMATINE) tablet 10 mg, 10 mg, Oral, TID WC  heparin (porcine) injection 5,000 Units, 5,000 Units, SubCUTAneous, 3 times per day  metoprolol succinate (TOPROL XL) extended release tablet 25 mg, 25 mg, Oral, Daily  [Held by provider] digoxin (LANOXIN) injection 125 mcg, 125 mcg, IntraVENous, Daily  sodium chloride (OCEAN, BABY AYR) 0.65 % nasal spray 1 spray, 1 spray, Each Nostril, PRN  sucralfate (CARAFATE) tablet 1 g, 1 g, Oral, 4x Daily  rOPINIRole (REQUIP) tablet 1 mg, 1 mg, Oral, TID  atorvastatin (LIPITOR) tablet 20 mg, 20 mg, Oral, Nightly  [Held by provider] insulin glargine (LANTUS) injection vial 13 Units, 13 Units, SubCUTAneous, BID  loperamide (IMODIUM) capsule 2 mg, 2 mg, Oral, 4x Daily PRN  [Held by provider] LORazepam (ATIVAN) tablet 0.5 mg, 0.5 mg, Oral, Nightly  insulin lispro (HUMALOG) injection vial 0-4 Units, 0-4 Units, SubCUTAneous, TID WC  insulin lispro (HUMALOG) injection vial 0-4 Units, 0-4 Units, SubCUTAneous, Nightly  budesonide (PULMICORT) nebulizer suspension 500 mcg, 0.5 mg, Nebulization, BID  sodium chloride flush 0.9 % injection 10 mL, 10 mL, IntraVENous, 2 times per day  sodium chloride flush 0.9 % injection 10 mL, 10 mL, IntraVENous, PRN  0.9 % sodium chloride infusion, , IntraVENous, PRN  promethazine (PHENERGAN) tablet 12.5 mg, 12.5 mg, Oral, Q6H PRN **OR** ondansetron (ZOFRAN) injection 4 mg, 4 mg, IntraVENous, Q6H PRN  polyethylene glycol (GLYCOLAX) packet 17 g, 17 g, Oral, Daily PRN  acetaminophen (TYLENOL) tablet 650 mg, 650 mg, Oral, Q6H PRN **OR** acetaminophen (TYLENOL) suppository 650 mg, 650 mg, Rectal, Q6H PRN  miconazole (MICOTIN) 2 % powder, , Topical, BID  arformoterol tartrate (BROVANA) nebulizer solution 15 mcg, 15 mcg, Nebulization, BID  pantoprazole (PROTONIX) tablet 40 mg, 40 mg, Oral, QAM AC  glucagon (rDNA) injection 1 mg, 1 mg, SubCUTAneous, PRN  [Held by provider] apixaban (ELIQUIS) tablet 5 mg, 5 mg, Oral, BID  mirtazapine (REMERON) tablet 7.5 mg, 7.5 mg, Oral, Nightly  carbidopa-levodopa (SINEMET CR)  MG per extended release tablet 1 tablet, 1 tablet, Oral, TID  sertraline (ZOLOFT) tablet 50 mg, 50 mg, Oral, Daily  montelukast (SINGULAIR) tablet 10 mg, 10 mg, Oral, Nightly  Physical    VITALS:  BP (!) 95/45   Pulse 74   Temp 98.2 °F (36.8 °C) (Oral)   Resp 18   Ht 5' (1.524 m)   Wt 216 lb 0.8 oz (98 kg)   SpO2 100%   BMI 42.19 kg/m²   No acute distress moist membranes   Normocephalic, without obvious abnormality, atraumatic, external ears without lesions,   Neck supple no cervical lymphadenopathy  Heart has a regular rate and rhythm no murmur  Lungs are clear to auscultation bilaterally with equal movements. Anasarca still present. This is noticeable in the dependent areas. It is also noticeable that she is less edematous in her distal extremities  good peripheral perfusion. No significant rashes or new skin lesions.   No new focality on neuro exam which is overall grossly intact. Affect and mood seem to be appropriate     Data    CBC:   Lab Results   Component Value Date/Time    WBC 8.0 02/19/2023 07:29 AM    RBC 3.29 02/19/2023 07:29 AM    HGB 8.1 02/19/2023 07:29 AM    HCT 29.7 02/19/2023 07:29 AM    MCV 90.3 02/19/2023 07:29 AM    MCH 24.6 02/19/2023 07:29 AM    MCHC 27.3 02/19/2023 07:29 AM    RDW 19.6 02/19/2023 07:29 AM     02/19/2023 07:29 AM    MPV 12.4 02/19/2023 07:29 AM     BMP:    Lab Results   Component Value Date/Time     02/19/2023 07:29 AM    K 4.0 02/19/2023 07:29 AM    CL 95 02/19/2023 07:29 AM    CO2 31 02/19/2023 07:29 AM    BUN 17 02/19/2023 07:29 AM    LABALBU 4.0 02/19/2023 07:29 AM    CREATININE 2.5 02/19/2023 07:29 AM    CALCIUM 8.3 02/19/2023 07:29 AM    GFRAA >60 10/16/2022 09:20 AM    LABGLOM 20 02/19/2023 07:29 AM    GLUCOSE 110 02/19/2023 07:29 AM       ASSESSMENT AND PLAN    Patient was admitted with dyspnea and hypotension. She was found to be in fluid overload. She was diuresed with minimal improvement. She was treated with Bumex drip. Patient reluctantly agreed to dialysis. She is slowly improving with dialysis. However, pleural effusion still persistent. Acute on chronic respiratory failure with hypoxia and hypercapnia: breathing treatment . bipap prn. O2 as much as needs. Needs is decreasing  CKD stage 3 now on dialysis for volume overload. Volume overload is improving but still anasarca. Will give HD / ultrafiltration few more days before moving forward with discharge plans. DM type 2 complicated with nephropathy  Hypotension: improved  Anemia due to CKD: hgb is stable. EJulius Jorgito UTI: completed Zyvox  Parkinson's disease: overall stable. Continue home meds  Sleep Apnea: continue bipap as ordered. Pleural Effusion: per Pulm. DVT prophylaxis: Eliquis held for pleurex placement on 2/14  Full code. Palliative care encounter. POA is dtr [employer].   There is a code for family members to call in. RN staff continues to report problematic interactions with patient's children  Disposition:  by 2/21 give or take a day Renal will help determine usefulness of current course. The hope is to off load her anasarca enough to improve respiratory status and make her stable enough for ecf .   The trend for last few days seems like this is  helping

## 2023-02-20 LAB
ALBUMIN SERPL-MCNC: 4.6 G/DL (ref 3.5–5.2)
ALP BLD-CCNC: 53 U/L (ref 35–104)
ALT SERPL-CCNC: <5 U/L (ref 0–32)
ANION GAP SERPL CALCULATED.3IONS-SCNC: 10 MMOL/L (ref 7–16)
ANISOCYTOSIS: ABNORMAL
AST SERPL-CCNC: 7 U/L (ref 0–31)
BASOPHILIC STIPPLING: ABNORMAL
BASOPHILS ABSOLUTE: 0.07 E9/L (ref 0–0.2)
BASOPHILS RELATIVE PERCENT: 1 % (ref 0–2)
BILIRUB SERPL-MCNC: 0.7 MG/DL (ref 0–1.2)
BUN BLDV-MCNC: 9 MG/DL (ref 6–23)
CALCIUM SERPL-MCNC: 8.4 MG/DL (ref 8.6–10.2)
CHLORIDE BLD-SCNC: 98 MMOL/L (ref 98–107)
CO2: 29 MMOL/L (ref 22–29)
CREAT SERPL-MCNC: 1.5 MG/DL (ref 0.5–1)
EOSINOPHILS ABSOLUTE: 0.15 E9/L (ref 0.05–0.5)
EOSINOPHILS RELATIVE PERCENT: 2 % (ref 0–6)
GFR SERPL CREATININE-BSD FRML MDRD: 37 ML/MIN/1.73
GLUCOSE BLD-MCNC: 97 MG/DL (ref 74–99)
HCT VFR BLD CALC: 26.6 % (ref 34–48)
HEMOGLOBIN: 7.6 G/DL (ref 11.5–15.5)
HYPOCHROMIA: ABNORMAL
LYMPHOCYTES ABSOLUTE: 1.24 E9/L (ref 1.5–4)
LYMPHOCYTES RELATIVE PERCENT: 17 % (ref 20–42)
MCH RBC QN AUTO: 25.2 PG (ref 26–35)
MCHC RBC AUTO-ENTMCNC: 28.6 % (ref 32–34.5)
MCV RBC AUTO: 88.4 FL (ref 80–99.9)
METER GLUCOSE: 107 MG/DL (ref 74–99)
METER GLUCOSE: 132 MG/DL (ref 74–99)
METER GLUCOSE: 152 MG/DL (ref 74–99)
METER GLUCOSE: 97 MG/DL (ref 74–99)
MONOCYTES ABSOLUTE: 0.51 E9/L (ref 0.1–0.95)
MONOCYTES RELATIVE PERCENT: 7 % (ref 2–12)
NEUTROPHILS ABSOLUTE: 5.33 E9/L (ref 1.8–7.3)
NEUTROPHILS RELATIVE PERCENT: 73 % (ref 43–80)
OVALOCYTES: ABNORMAL
PAPPENHEIMER BODIES: ABNORMAL
PDW BLD-RTO: 19.9 FL (ref 11.5–15)
PLATELET # BLD: 114 E9/L (ref 130–450)
PMV BLD AUTO: 12.5 FL (ref 7–12)
POIKILOCYTES: ABNORMAL
POLYCHROMASIA: ABNORMAL
POTASSIUM REFLEX MAGNESIUM: 3.6 MMOL/L (ref 3.5–5)
RBC # BLD: 3.01 E12/L (ref 3.5–5.5)
SODIUM BLD-SCNC: 137 MMOL/L (ref 132–146)
TEAR DROP CELLS: ABNORMAL
TOTAL PROTEIN: 6.3 G/DL (ref 6.4–8.3)
WBC # BLD: 7.3 E9/L (ref 4.5–11.5)

## 2023-02-20 PROCEDURE — 6370000000 HC RX 637 (ALT 250 FOR IP): Performed by: NURSE PRACTITIONER

## 2023-02-20 PROCEDURE — 82962 GLUCOSE BLOOD TEST: CPT

## 2023-02-20 PROCEDURE — 6370000000 HC RX 637 (ALT 250 FOR IP): Performed by: INTERNAL MEDICINE

## 2023-02-20 PROCEDURE — 6360000002 HC RX W HCPCS: Performed by: INTERNAL MEDICINE

## 2023-02-20 PROCEDURE — 99232 SBSQ HOSP IP/OBS MODERATE 35: CPT | Performed by: INTERNAL MEDICINE

## 2023-02-20 PROCEDURE — 2580000003 HC RX 258: Performed by: INTERNAL MEDICINE

## 2023-02-20 PROCEDURE — 94640 AIRWAY INHALATION TREATMENT: CPT

## 2023-02-20 PROCEDURE — 6360000002 HC RX W HCPCS

## 2023-02-20 PROCEDURE — 94660 CPAP INITIATION&MGMT: CPT

## 2023-02-20 PROCEDURE — 36415 COLL VENOUS BLD VENIPUNCTURE: CPT

## 2023-02-20 PROCEDURE — 6360000002 HC RX W HCPCS: Performed by: NURSE PRACTITIONER

## 2023-02-20 PROCEDURE — 2060000000 HC ICU INTERMEDIATE R&B

## 2023-02-20 PROCEDURE — 85025 COMPLETE CBC W/AUTO DIFF WBC: CPT

## 2023-02-20 PROCEDURE — P9047 ALBUMIN (HUMAN), 25%, 50ML: HCPCS | Performed by: INTERNAL MEDICINE

## 2023-02-20 PROCEDURE — 90935 HEMODIALYSIS ONE EVALUATION: CPT

## 2023-02-20 PROCEDURE — 80053 COMPREHEN METABOLIC PANEL: CPT

## 2023-02-20 PROCEDURE — 2700000000 HC OXYGEN THERAPY PER DAY

## 2023-02-20 RX ORDER — ALBUMIN (HUMAN) 12.5 G/50ML
25 SOLUTION INTRAVENOUS ONCE
Status: COMPLETED | OUTPATIENT
Start: 2023-02-20 | End: 2023-02-20

## 2023-02-20 RX ADMIN — BUDESONIDE 500 MCG: 0.5 SUSPENSION RESPIRATORY (INHALATION) at 19:38

## 2023-02-20 RX ADMIN — SUCRALFATE 1 G: 1 TABLET ORAL at 13:38

## 2023-02-20 RX ADMIN — HEPARIN SODIUM 5000 UNITS: 5000 INJECTION INTRAVENOUS; SUBCUTANEOUS at 13:38

## 2023-02-20 RX ADMIN — MONTELUKAST SODIUM 10 MG: 10 TABLET, FILM COATED ORAL at 21:41

## 2023-02-20 RX ADMIN — ANTI-FUNGAL POWDER MICONAZOLE NITRATE TALC FREE: 1.42 POWDER TOPICAL at 08:03

## 2023-02-20 RX ADMIN — MAGNESIUM OXIDE 400 MG: 400 TABLET ORAL at 08:02

## 2023-02-20 RX ADMIN — SUCRALFATE 1 G: 1 TABLET ORAL at 16:01

## 2023-02-20 RX ADMIN — ALBUTEROL SULFATE 2.5 MG: 2.5 SOLUTION RESPIRATORY (INHALATION) at 14:55

## 2023-02-20 RX ADMIN — Medication 10 ML: at 08:04

## 2023-02-20 RX ADMIN — HEPARIN SODIUM 5000 UNITS: 5000 INJECTION INTRAVENOUS; SUBCUTANEOUS at 21:42

## 2023-02-20 RX ADMIN — ALBUMIN (HUMAN) 25 G: 25 SOLUTION INTRAVENOUS at 11:32

## 2023-02-20 RX ADMIN — ARFORMOTEROL TARTRATE 15 MCG: 15 SOLUTION RESPIRATORY (INHALATION) at 19:37

## 2023-02-20 RX ADMIN — IPRATROPIUM BROMIDE 0.5 MG: 0.5 SOLUTION RESPIRATORY (INHALATION) at 19:37

## 2023-02-20 RX ADMIN — IPRATROPIUM BROMIDE 0.5 MG: 0.5 SOLUTION RESPIRATORY (INHALATION) at 14:55

## 2023-02-20 RX ADMIN — ALBUTEROL SULFATE 2.5 MG: 2.5 SOLUTION RESPIRATORY (INHALATION) at 07:05

## 2023-02-20 RX ADMIN — Medication 10 ML: at 21:42

## 2023-02-20 RX ADMIN — ROPINIROLE HYDROCHLORIDE 1 MG: 1 TABLET, FILM COATED ORAL at 08:01

## 2023-02-20 RX ADMIN — ALBUTEROL SULFATE 2.5 MG: 2.5 SOLUTION RESPIRATORY (INHALATION) at 19:37

## 2023-02-20 RX ADMIN — MIDODRINE HYDROCHLORIDE 10 MG: 5 TABLET ORAL at 16:01

## 2023-02-20 RX ADMIN — SERTRALINE 50 MG: 50 TABLET, FILM COATED ORAL at 08:02

## 2023-02-20 RX ADMIN — ANTI-FUNGAL POWDER MICONAZOLE NITRATE TALC FREE: 1.42 POWDER TOPICAL at 21:42

## 2023-02-20 RX ADMIN — MIRTAZAPINE 7.5 MG: 15 TABLET, FILM COATED ORAL at 21:41

## 2023-02-20 RX ADMIN — SUCRALFATE 1 G: 1 TABLET ORAL at 21:41

## 2023-02-20 RX ADMIN — CARBIDOPA AND LEVODOPA 1 TABLET: 50; 200 TABLET, EXTENDED RELEASE ORAL at 16:01

## 2023-02-20 RX ADMIN — HEPARIN SODIUM 5000 UNITS: 5000 INJECTION INTRAVENOUS; SUBCUTANEOUS at 05:44

## 2023-02-20 RX ADMIN — MIDODRINE HYDROCHLORIDE 10 MG: 5 TABLET ORAL at 05:43

## 2023-02-20 RX ADMIN — MIDODRINE HYDROCHLORIDE 10 MG: 5 TABLET ORAL at 13:37

## 2023-02-20 RX ADMIN — ARFORMOTEROL TARTRATE 15 MCG: 15 SOLUTION RESPIRATORY (INHALATION) at 07:05

## 2023-02-20 RX ADMIN — METOPROLOL SUCCINATE 25 MG: 25 TABLET, FILM COATED, EXTENDED RELEASE ORAL at 08:02

## 2023-02-20 RX ADMIN — PANTOPRAZOLE SODIUM 40 MG: 40 TABLET, DELAYED RELEASE ORAL at 05:43

## 2023-02-20 RX ADMIN — SUCRALFATE 1 G: 1 TABLET ORAL at 08:02

## 2023-02-20 RX ADMIN — EPOETIN ALFA-EPBX 3000 UNITS: 3000 INJECTION, SOLUTION INTRAVENOUS; SUBCUTANEOUS at 17:58

## 2023-02-20 RX ADMIN — CARBIDOPA AND LEVODOPA 1 TABLET: 50; 200 TABLET, EXTENDED RELEASE ORAL at 21:41

## 2023-02-20 RX ADMIN — ROPINIROLE HYDROCHLORIDE 1 MG: 1 TABLET, FILM COATED ORAL at 13:37

## 2023-02-20 RX ADMIN — IPRATROPIUM BROMIDE 0.5 MG: 0.5 SOLUTION RESPIRATORY (INHALATION) at 07:05

## 2023-02-20 RX ADMIN — ATORVASTATIN CALCIUM 20 MG: 20 TABLET, FILM COATED ORAL at 21:42

## 2023-02-20 RX ADMIN — BUDESONIDE 500 MCG: 0.5 SUSPENSION RESPIRATORY (INHALATION) at 07:05

## 2023-02-20 RX ADMIN — ROPINIROLE HYDROCHLORIDE 1 MG: 1 TABLET, FILM COATED ORAL at 21:42

## 2023-02-20 RX ADMIN — CARBIDOPA AND LEVODOPA 1 TABLET: 50; 200 TABLET, EXTENDED RELEASE ORAL at 08:02

## 2023-02-20 ASSESSMENT — PAIN SCALES - GENERAL
PAINLEVEL_OUTOF10: 0
PAINLEVEL_OUTOF10: 0

## 2023-02-20 NOTE — PLAN OF CARE
Problem: Discharge Planning  Goal: Discharge to home or other facility with appropriate resources  Outcome: Progressing  Flowsheets (Taken 2/20/2023 0845)  Discharge to home or other facility with appropriate resources: Identify barriers to discharge with patient and caregiver     Problem: Respiratory - Adult  Goal: Achieves optimal ventilation and oxygenation  Outcome: Progressing  Flowsheets (Taken 2/20/2023 0845)  Achieves optimal ventilation and oxygenation: Assess for changes in respiratory status     Problem: Cardiovascular - Adult  Goal: Maintains optimal cardiac output and hemodynamic stability  Outcome: Progressing  Flowsheets (Taken 2/20/2023 0845)  Maintains optimal cardiac output and hemodynamic stability: Monitor blood pressure and heart rate  Goal: Absence of cardiac dysrhythmias or at baseline  Outcome: Progressing  Flowsheets (Taken 2/20/2023 0845)  Absence of cardiac dysrhythmias or at baseline: Monitor cardiac rate and rhythm     Problem: Cardiovascular - Adult  Goal: Absence of cardiac dysrhythmias or at baseline  Outcome: Progressing  Flowsheets (Taken 2/20/2023 0845)  Absence of cardiac dysrhythmias or at baseline: Monitor cardiac rate and rhythm

## 2023-02-20 NOTE — FLOWSHEET NOTE
02/20/23 1245   Vital Signs   BP (!) 94/56   Temp 97.3 °F (36.3 °C)   Heart Rate 54   Resp 16   Weight 216 lb 12.8 oz (98.3 kg)   Weight Method Bed scale   Percent Weight Change -0.96   Post-Hemodialysis Assessment   Post-Treatment Procedures Blood returned;Catheter capped, clamped and heparinized x 2 ports   Machine Disinfection Process Acid/Vinegar Clean;Bicarb Jug Disinfection; Heat Disinfect;Verified Absence of Bleach in HCO3 Jug;Machine Absence of Bleach Machine; Exterior Machine Disinfection   Rinseback Volume (ml) 300 ml   Blood Volume Processed (Liters) 67.6 l/min   Dialyzer Clearance Lightly streaked   Duration of Treatment (minutes) 210 minutes   Hemodialysis Intake (ml) 300 ml   Hemodialysis Output (ml) 1300 ml   NET Removed (ml) 1000   Tolerated Treatment Good   Interventions Taken Medication   Patient Response to Treatment HD tolerated as ordered. Cath care completed per policy, dressing changed. Pt returned to room via bed.    Bilateral Breath Sounds Diminished   Edema Generalized   Edema Generalized Non-pitting   RUE Edema +1   LUE Edema +1   RLE Edema +1   LLE Edema +1   Time Off 1230   Patient Disposition Return to room

## 2023-02-20 NOTE — PROGRESS NOTES
[facility] MD Lazara Gallegos MD Chauncey Glee, MD Elyse Horner, BRADLEY Devlin, NUNU Weaver CNP  Progress Note    2/20/2023    SUBJECTIVE:   1/20: Feeling little better today. Denies new complaint. Still tachypnea, though denies dyspnea no cp/palp Appetite ok ROS otherwise unremarkable    1//21 seen in her room on o2/nc bp stable weak poor po intake   2+ edema in LE     1/22 charts reviewed . On bipap    1/23 : on o2/nc . Still look hypervolemic     1/24 seen in her room comfortable o2/nc . Still with LE edema which seems to be multifactorial and will likely need to accept having some edema on board     1/25 sitting on edge of the bed working with pt . Still with considerable edema in LEs   BP stable     1/26 seen in her room reports of SOB with minimal activity , LE edema still signficant . 1/27 good UO On 5 L/o2/nc  Still with sob with activity Tachycardiac with low functional capacity     1/28: Asleep, resting and breathing comfortably on nasal cannula. Remains edematous. Ongoing fatigue and generalized weakness. 1/29: Hypotensive last evening, Bumex drip stopped, IV normal saline bolus administered to effect. Breathing little more easily though still on AVAPS. To be downgraded to CPAP shortly. Thirsty. Still markedly edematous. 1/30: Somnolent, though arousable. On CPAP. Ongoing fatigue and malaise with generalized weakness    1/31 seen in her room , skin wrinkling in LE on HFNC . BP on lower side  For thoracentesis today   Dw RN .     2/1: Seen while laying in bed, no acute distress. Tells me that she is thirsty. She is on heated high flow nasal canula. Feels that her breathing has improved slightly. Plan is for thoracentesis later this afternoon, could not be done yesterday due to elevated INR. Still with skin wrinkling to bilateral lower extremities.      2/2 1 L removed with thoracentesis   O2 requirement better on 8 L o2/nc Very weak and deconditioned   Labile BP numbers    2/2 o2/nc LE UO ok remain weak decondtioned . Edema in LE getting somewhat worse    2/4 remain with significant depdent edema deconditioned in bed     2/5: Discussed with respiratory therapist: Just put on her CPAP after having tolerated nasal cannula throughout most of the day. Remains bedbound, severely weak and debilitated. Remains massively swollen, little change in the last 5 days. 2/6: Status quo. Somnolent but arousable. Desats and dyspneic without AVAPS. Remains massively edematous    2/7:  resp status is status quo. More awake, alert, interactive today. Resumed bumex gtt yesterday. 2/8 for dialysis line in am   Alert oriented   Remain markedly hypervolemic     2/9: Sitting up in bed, on Bipap. Family is present at bedside. Markedly edematous. She is asking me if [facility] placement will hurt. She reports dyspnea, but feels better on Bipap. She denies chest pain or palpitations. Plan is for [facility] catheter placement with IR today. 2/10: Seen while on HD. Tolerating without complications. Tells me she is feeling much better. Still edematous, but edema has improved substantially. She is on nasal canula, eating breakfast. She had 2.7 L removed yesterday and 3L removed today. 2/11: Dialysis today notable for hypotension and only 2.1 L UF. Currently asleep, denies new complaint. Ongoing fatigue, malaise, generalized weakness. 2/12: Somewhat less anxious than she had been over the past several days. Did tolerate roughly 3 hours off of CPAP consecutively yesterday. Still mostly using CPAP throughout the day. Swelling better. 2/13: Seen while in dialysis. Tolerating ultrafiltration without complications. Plan is to remove 3L today. Blood pressure is stable. She is on nasal canula. She tells me that she goes back and forth between nasal canula and CPAP. Plan is for a right sided pleurix catheter to be placed later today.      2/14: Seen while in dialysis. Tolerating treatment without complication. Blood pressure is stable. She is on nasal canula. Tells me she continues to feel better each day. Still with significant abdominal swelling. 2/15:  Laying in bed on BiPap. She has mild shortness of breath. BP remains low. She denies chest pain. Appetite is fair. 2/16: Seen while on hemodialysis. Tolerating treatment without complication. Blood pressure has improved and is stable. She denies dizziness, chest pain, dyspnea, or palpitations. Tells me she is tolerating being off the BIPAP for longer periods. Appetite is good. 2/17 swelling improved   Clinically better  O2 requirement better  Remain weak and deconditioned and basically bedbound     2/18 seen in her HD today with no reported issues o2 requirment continue to improve edema better   Need to work with pt/ot . She is basically bedbound     2/20: Seen while laying in bed. Tolerated HD earlier today. 1 liter of fluid removed. Her edema has improved substantially. She tells me she is feeling much better. She is on nasal canula. She tells me she is still making urine. PROBLEM LIST:    Principal Problem:    Acute on chronic respiratory failure with hypoxia and hypercapnia (HCC)  Active Problems:    Palliative care encounter  Resolved Problems:    * No resolved hospital problems. *         DIET:    ADULT DIET; Regular; 3 carb choices (45 gm/meal); Low Fat/Low Chol/High Fiber/2 gm Na; Moderately Thick (Honey); 1200 ml  ADULT ORAL NUTRITION SUPPLEMENT; Lunch, Dinner;  Other Oral Supplement; Gelatein 20     MEDS (scheduled):    albuterol  2.5 mg Nebulization 4x daily    And    ipratropium  0.5 mg Nebulization 4x daily    magnesium oxide  400 mg Oral Daily    midodrine  10 mg Oral TID WC    heparin (porcine)  5,000 Units SubCUTAneous 3 times per day    metoprolol succinate  25 mg Oral Daily    [Held by provider] digoxin  125 mcg IntraVENous Daily    sucralfate  1 g Oral 4x Daily    rOPINIRole 1 mg Oral TID    atorvastatin  20 mg Oral Nightly    [Held by provider] insulin glargine  13 Units SubCUTAneous BID    [Held by provider] LORazepam  0.5 mg Oral Nightly    insulin lispro  0-4 Units SubCUTAneous TID WC    insulin lispro  0-4 Units SubCUTAneous Nightly    budesonide  0.5 mg Nebulization BID    sodium chloride flush  10 mL IntraVENous 2 times per day    miconazole   Topical BID    arformoterol tartrate  15 mcg Nebulization BID    pantoprazole  40 mg Oral QAM AC    [Held by provider] apixaban  5 mg Oral BID    mirtazapine  7.5 mg Oral Nightly    carbidopa-levodopa  1 tablet Oral TID    sertraline  50 mg Oral Daily    montelukast  10 mg Oral Nightly       MEDS (infusions):   dextrose      sodium chloride 25 mL/hr at 02/04/23 0222       MEDS (prn):  glucose, dextrose bolus **OR** dextrose bolus, glucagon (rDNA), dextrose, sodium chloride, loperamide, sodium chloride flush, sodium chloride, promethazine **OR** ondansetron, polyethylene glycol, acetaminophen **OR** acetaminophen, glucagon (rDNA)    PHYSICAL EXAM:     Patient Vitals for the past 24 hrs:   BP Temp Temp src Pulse Resp SpO2 Weight   02/20/23 1245 (!) 94/56 97.3 °F (36.3 °C) Oral 54 16 93 % 216 lb 12.8 oz (98.3 kg)   02/20/23 1215 (!) 96/57 -- -- -- -- -- --   02/20/23 1200 (!) 92/59 -- -- 50 -- -- --   02/20/23 1130 (!) 86/57 -- -- 51 -- -- --   02/20/23 1100 (!) 88/49 -- -- 51 -- -- --   02/20/23 1030 (!) 90/48 -- -- 50 -- -- --   02/20/23 0959 105/87 -- -- 60 -- -- --   02/20/23 0930 (!) 99/56 -- -- 50 -- -- --   02/20/23 0900 (!) 96/58 -- -- 50 -- -- --   02/20/23 0845 -- 97.3 °F (36.3 °C) -- 60 18 -- 218 lb 14.4 oz (99.3 kg)   02/20/23 0757 (!) 108/50 97.2 °F (36.2 °C) Oral 52 19 93 % --   02/20/23 0600 -- -- -- -- -- -- 218 lb 14.4 oz (99.3 kg)   02/20/23 0530 138/78 98 °F (36.7 °C) Oral 60 18 94 % --   02/19/23 2045 95/70 98 °F (36.7 °C) Oral 61 19 100 % --     @      Intake/Output Summary (Last 24 hours) at 2/20/2023 9093  Last data filed at 2/20/2023 1245  Gross per 24 hour   Intake 300 ml   Output 1300 ml   Net -1000 ml           Wt Readings from Last 3 Encounters:   02/20/23 216 lb 12.8 oz (98.3 kg)   01/16/23 259 lb (117.5 kg)   01/16/23 259 lb (117.5 kg)     Age-appropriate morbidly obese white woman, though easily arousable, no apparent distress  NC/AT EOMI sclera conjunctive are clear and anicteric mucous membranes are moist  Neck soft supple trachea midline  Lungs:  Diminished bilaterally. Poor inspiratory effort. Right pleurix catheter   Regular rhythm normal S1 and S2  Abdomen soft nontender nondistended normal active bowel sounds. Abdominal pannus has substantial edema (+1 pitting)  Distal extremities, thighs, sacrum have substantial chronic type nonpitting edema, trace BLE edema. Pulses 1+ x4  Moves all 4 extremities  Cranial nerves II through XII grossly intact  Awake, alert, interactive and appropriate  Skin tone consistent with chronic venous stasis distally      DATA:    Recent Labs     02/18/23  0930 02/19/23  0729 02/20/23  1342   WBC 7.9 8.0 7.3   HGB 7.6* 8.1* 7.6*   HCT 28.4* 29.7* 26.6*   MCV 93.7 90.3 88.4    134 114*           Recent Labs     02/18/23  0930 02/19/23  0729 02/20/23  1342    135 137   K 3.7 4.0 3.6   CL 97* 95* 98   CO2 30* 31* 29   BUN 24* 17 9   CREATININE 3.2* 2.5* 1.5*   ALT <5 <5 <5   AST 6 14 7   BILITOT 0.5 0.8 0.7   ALKPHOS 55 59 53         Lab Results   Component Value Date    LABPROT 0.5 (H) 01/18/2023    LABPROT 0.5 01/18/2023     On CT no hydro/signs of obstruction     Urine studies  U Na 21, U Cl 23 U prot:cr ratio 0.5    ASSESSMENT / RECOMMENDATIONS:       Assessment  1. Acute kidney injury urine lytes support decrease effective volume     2. CKD stage III, Baseline creatinine 1.4 to 1.7 mg/dL, secondary to diabetic nephropathy and renal microvascular atherosclerotic disease. 0.5 g proteinuria     3.   Anemia due to CKD, phlebotomy associated prolonged hospitalization 4.  Acute hypercapnic and hypoxic respiratory failure due to COPD exacerbation and pneumonia. Does not appear hypervolemic. 5.  Morbid obesity, BMI 50.6 kg per metered square     6. Edema/anasarca, chronic, multifactorial, due to venous insufficiency in the setting of morbid obesity, relative immobility, likely diastolic dysfunction though it was \"indeterminate\" on echo, the does have pulmonary hypertension, mild right-sided heart failure    7. Hypotension    8.   Elevated bicarbonate represents compensation for chronic respiratory acidosis, as well as likely contraction alkalosis due to diuresis on the Bumex drip       Recommendations  -continue Renal replacement therapy support for UF and solute purposes  -place on HD schedule MWF   -Start Retacrit   -Continue Midodrine 10 mg TID   -PT/OT   -SW for HD unit placement      Electronically signed by RASHARD Meza - BRADLEY on 2/20/2023

## 2023-02-20 NOTE — PROGRESS NOTES
Palliative Care Department  [phone]  Palliative Care Progress Note  Provider Isaiah Longoria, APRN - CNP     Ware Lips  [id number]  Hospital Day: 28  Date of Initial Consult: 1/30/2023, re-consult 2/17/2023  Referring Provider: Katherine Bhatia MD   Palliative Medicine was consulted for assistance with: Goals of care, CODE STATUS discussion, family support, symptom management    HPI:   Narendra Khan is a 67 y.o. with a medical history of CHF, A. fib, anxiety, asthma, CAD, breast CA, COPD, depression, diabetes, hyperlipidemia, hypertension, SERENA, Parkinson's disease who was admitted on 1/17/2023 from nursing facility with a CHIEF COMPLAINT of hypotension, weight gain, leg swelling. Patient states that she is having shortness of breath. She wears 2 to 3 L O2 via nasal cannula at baseline. Patient was at CHF clinic and was hypotensive and had a 4 pound weight gain over the past week. In ED, patient was found to be hypotensive with A. fib RVR and respiratory failure was placed on BiPAP. Chest x-ray showed stable right pleural effusion. Patient had a repeat chest x-ray 1/30/2023 which showed complete opacification of the right hemithorax related to pleural effusion. Modest contralateral infiltrate ileum and/or atelectasis at the left lower chest.  Patient with persistent right pleural effusion requiring Pleurx catheter placement on 2/14/2023. Due to worsening in kidney function patient was started on hemodialysis on 2/9/23. Patient remains on 9 L high flow nasal cannula. Pulmonology, ID, nephrology following. Palliative medicine consulted for further assistance.     ASSESSMENT/PLAN:     Pertinent Hospital Diagnoses     Acute on chronic respiratory failure with hypoxia  Congestive heart failure  Atrial fibrillation  Urinary tract infection  Acute on chronic kidney disease     Palliative Care Encounter / Counseling Regarding Goals of Care  Please see detailed goals of care discussion as below  At this time, Nawaf Manriquez, Does have capacity for medical decision-making. Capacity is time limited and situation/question specific  During encounter there was no surrogate medical decision-maker  Outcome of goals of care meeting:   Continue current medical management  Continue HD  Discharge to nursing facility   Continue HD  Code status Full Code  Advanced Directives: no POA or living will in epic, POA in soft chart  Surrogate/Legal NOK:  Rachel Ontiveros (child) [phone]  Emanuel Mullen (child) [phone]  Barry Lenz (child) [phone]    Spiritual assessment: no spiritual distress identified  Bereavement and grief: to be determined  Referrals to: none today  SUBJECTIVE:     Current medical issues leading to Palliative Medicine involvement include   Active Hospital Problems    Diagnosis Date Noted    Palliative care encounter [Z51.5] 01/31/2023     Priority: Medium    Acute on chronic respiratory failure with hypoxia and hypercapnia ([facility] [address] [V34.62, J96.22] 01/17/2023     Priority: Medium       Details of Conversation:    Chart reviewed. Patient seen resting in bed s/p HD in NAD> she denies any complaints at this time. I confirmed discharge plan to nursing facility where she can continue dialysis and have her pleur-X catheter. Also confirmed FULL CODE. Goals of care and CODE STATUS established. There are no further PM needs at this time. PM will now sign off. If new PM needs arise or patient's condition deteriorates, please re-consult. Thank you.     OBJECTIVE:   Prognosis: Guarded    Physical Exam:  BP (!) 96/57   Pulse 50   Temp 97.3 °F (36.3 °C)   Resp 18   Ht 5' (1.524 m)   Wt 218 lb 14.4 oz (99.3 kg)   SpO2 93%   BMI 42.75 kg/m²   Constitutional:  Obese, awake, alert  Eyes: no scleral icterus, normal lids, no discharge  ENMT:  Normocephalic, atraumatic, mucosa moist, EOMI  Neck:  trachea midline, no JVD  Lungs:  Diminished  Heart[de-identified]  Irregular  Abd:  Soft, obese, non tender, distended, bowel sounds present  Ext:  Weakness, +edema, pulses present  Skin:  Warm and dry, impaired skin integrity  Psych: non-anxious  Neuro:  A&Ox4, following commands    Objective data reviewed: labs, images, records, medication use, vitals, and chart    Discussed patient and the plan of care with the other IDT members: Palliative Medicine IDT Team and Patient, nephrology NP    Time/Communication  Greater than 50% of time spent, total 25 minutes in counseling and coordination of care at the bedside regarding  CODE STATUS discussion, goals of care, and diagnosis and prognosis. Thank you for allowing Palliative Medicine to participate in the care of Sebas Downey.

## 2023-02-20 NOTE — PROGRESS NOTES
Department of Internal Medicine  General Internal Medicine  Attending Progress Note  Chief Complaint   Patient presents with    Respiratory Distress     Normally on 3L NC, came in on CPAP, given total of 50mcg push dose epi for low BP by EMS     SUBJECTIVE:    Reports that she is doing better. Denied fever and chills. No chest pain. Noted improved breathing. Aware of plans to continue dialysis and discharge planning.     OBJECTIVE      Medications    Current Facility-Administered Medications: albumin human 25 % IV solution 25 g, 25 g, IntraVENous, Once  albuterol (PROVENTIL) nebulizer solution 2.5 mg, 2.5 mg, Nebulization, 4x daily **AND** ipratropium (ATROVENT) 0.02 % nebulizer solution 0.5 mg, 0.5 mg, Nebulization, 4x daily  glucose chewable tablet 16 g, 4 tablet, Oral, PRN  dextrose bolus 10% 125 mL, 125 mL, IntraVENous, PRN **OR** dextrose bolus 10% 250 mL, 250 mL, IntraVENous, PRN  glucagon (rDNA) injection 1 mg, 1 mg, SubCUTAneous, PRN  dextrose 10 % infusion, , IntraVENous, Continuous PRN  magnesium oxide (MAG-OX) tablet 400 mg, 400 mg, Oral, Daily  midodrine (PROAMATINE) tablet 10 mg, 10 mg, Oral, TID WC  heparin (porcine) injection 5,000 Units, 5,000 Units, SubCUTAneous, 3 times per day  metoprolol succinate (TOPROL XL) extended release tablet 25 mg, 25 mg, Oral, Daily  [Held by provider] digoxin (LANOXIN) injection 125 mcg, 125 mcg, IntraVENous, Daily  sodium chloride (OCEAN, BABY AYR) 0.65 % nasal spray 1 spray, 1 spray, Each Nostril, PRN  sucralfate (CARAFATE) tablet 1 g, 1 g, Oral, 4x Daily  rOPINIRole (REQUIP) tablet 1 mg, 1 mg, Oral, TID  atorvastatin (LIPITOR) tablet 20 mg, 20 mg, Oral, Nightly  [Held by provider] insulin glargine (LANTUS) injection vial 13 Units, 13 Units, SubCUTAneous, BID  loperamide (IMODIUM) capsule 2 mg, 2 mg, Oral, 4x Daily PRN  [Held by provider] LORazepam (ATIVAN) tablet 0.5 mg, 0.5 mg, Oral, Nightly  insulin lispro (HUMALOG) injection vial 0-4 Units, 0-4 Units, SubCUTAneous, TID WC  insulin lispro (HUMALOG) injection vial 0-4 Units, 0-4 Units, SubCUTAneous, Nightly  budesonide (PULMICORT) nebulizer suspension 500 mcg, 0.5 mg, Nebulization, BID  sodium chloride flush 0.9 % injection 10 mL, 10 mL, IntraVENous, 2 times per day  sodium chloride flush 0.9 % injection 10 mL, 10 mL, IntraVENous, PRN  0.9 % sodium chloride infusion, , IntraVENous, PRN  promethazine (PHENERGAN) tablet 12.5 mg, 12.5 mg, Oral, Q6H PRN **OR** ondansetron (ZOFRAN) injection 4 mg, 4 mg, IntraVENous, Q6H PRN  polyethylene glycol (GLYCOLAX) packet 17 g, 17 g, Oral, Daily PRN  acetaminophen (TYLENOL) tablet 650 mg, 650 mg, Oral, Q6H PRN **OR** acetaminophen (TYLENOL) suppository 650 mg, 650 mg, Rectal, Q6H PRN  miconazole (MICOTIN) 2 % powder, , Topical, BID  arformoterol tartrate (BROVANA) nebulizer solution 15 mcg, 15 mcg, Nebulization, BID  pantoprazole (PROTONIX) tablet 40 mg, 40 mg, Oral, QAM AC  glucagon (rDNA) injection 1 mg, 1 mg, SubCUTAneous, PRN  [Held by provider] apixaban (ELIQUIS) tablet 5 mg, 5 mg, Oral, BID  mirtazapine (REMERON) tablet 7.5 mg, 7.5 mg, Oral, Nightly  carbidopa-levodopa (SINEMET CR)  MG per extended release tablet 1 tablet, 1 tablet, Oral, TID  sertraline (ZOLOFT) tablet 50 mg, 50 mg, Oral, Daily  montelukast (SINGULAIR) tablet 10 mg, 10 mg, Oral, Nightly  Physical    VITALS:  BP (!) 92/59   Pulse 50   Temp 97.3 °F (36.3 °C)   Resp 18   Ht 5' (1.524 m)   Wt 218 lb 14.4 oz (99.3 kg)   SpO2 93%   BMI 42.75 kg/m²   CONSTITUTIONAL:  awake, cooperative, and no apparent distress  EYES:  extra-ocular muscles intact  ENT:  normocepalic, without obvious abnormality  NECK:  supple, symmetrical, trachea midline  LUNGS:  no increased work of breathing, no retractions, and clear to auscultation  CARDIOVASCULAR:  normal apical pulses and normal S1 and S2  ABDOMEN:  normal bowel sounds, non-distended, and non-tender  MUSCULOSKELETAL:  there is no redness, warmth, or swelling of the joints  NEUROLOGIC:  Mental Status Exam:  Level of Alertness:   awake  Orientation:   person, place, time  SKIN:  no bruising or bleeding  Data    CBC:   Lab Results   Component Value Date/Time    WBC 8.0 02/19/2023 07:29 AM    RBC 3.29 02/19/2023 07:29 AM    HGB 8.1 02/19/2023 07:29 AM    HCT 29.7 02/19/2023 07:29 AM    MCV 90.3 02/19/2023 07:29 AM    MCH 24.6 02/19/2023 07:29 AM    MCHC 27.3 02/19/2023 07:29 AM    RDW 19.6 02/19/2023 07:29 AM     02/19/2023 07:29 AM    MPV 12.4 02/19/2023 07:29 AM     BMP:    Lab Results   Component Value Date/Time     02/19/2023 07:29 AM    K 4.0 02/19/2023 07:29 AM    CL 95 02/19/2023 07:29 AM    CO2 31 02/19/2023 07:29 AM    BUN 17 02/19/2023 07:29 AM    LABALBU 4.0 02/19/2023 07:29 AM    CREATININE 2.5 02/19/2023 07:29 AM    CALCIUM 8.3 02/19/2023 07:29 AM    GFRAA >60 10/16/2022 09:20 AM    LABGLOM 20 02/19/2023 07:29 AM    GLUCOSE 110 02/19/2023 07:29 AM       ASSESSMENT AND PLAN      Acute on chronic respiratory failure with hypoxia and hypercapnia: continue supplemental oxygen. Wean off as tolerated. Continue to monitor. Palliative care encounter:  E. Faecalis UTI: completed Zyvox  Anemia due to CKD: now on dialysis. Continue  Sleep apnea: continue nocturnal Bipap  Pleural Effusion: continue dialysis. Pleurx cath inserted on 2/14. Management per pulm  Parkinson's disease: continue home med  DM type 2: continue to hold insulin.  Accucheck ACHS  Disposition: possible snf soon

## 2023-02-20 NOTE — PROGRESS NOTES
[facility] MD Kilo Godoy, MD Bartolo Rush, MD Tylor Flores, BRADLEY Devlin, NUNU Pat, BRADLEY  Progress Note    2/19/2023    SUBJECTIVE:   1/20: Feeling little better today. Denies new complaint. Still tachypnea, though denies dyspnea no cp/palp Appetite ok ROS otherwise unremarkable    1//21 seen in her room on o2/nc bp stable weak poor po intake   2+ edema in LE     1/22 charts reviewed . On bipap    1/23 : on o2/nc . Still look hypervolemic     1/24 seen in her room comfortable o2/nc . Still with LE edema which seems to be multifactorial and will likely need to accept having some edema on board     1/25 sitting on edge of the bed working with pt . Still with considerable edema in LEs   BP stable     1/26 seen in her room reports of SOB with minimal activity , LE edema still signficant . 1/27 good UO On 5 L/o2/nc  Still with sob with activity Tachycardiac with low functional capacity     1/28: Asleep, resting and breathing comfortably on nasal cannula. Remains edematous. Ongoing fatigue and generalized weakness. 1/29: Hypotensive last evening, Bumex drip stopped, IV normal saline bolus administered to effect. Breathing little more easily though still on AVAPS. To be downgraded to CPAP shortly. Thirsty. Still markedly edematous. 1/30: Somnolent, though arousable. On CPAP. Ongoing fatigue and malaise with generalized weakness    1/31 seen in her room , skin wrinkling in LE on HFNC . BP on lower side  For thoracentesis today   Dw RN .     2/1: Seen while laying in bed, no acute distress. Tells me that she is thirsty. She is on heated high flow nasal canula. Feels that her breathing has improved slightly. Plan is for thoracentesis later this afternoon, could not be done yesterday due to elevated INR. Still with skin wrinkling to bilateral lower extremities.      2/2 1 L removed with thoracentesis   O2 requirement better on 8 L o2/nc Very weak and deconditioned   Labile BP numbers    2/2 o2/nc LE UO ok remain weak decondtioned . Edema in LE getting somewhat worse    2/4 remain with significant depdent edema deconditioned in bed     2/5: Discussed with respiratory therapist: Just put on her CPAP after having tolerated nasal cannula throughout most of the day. Remains bedbound, severely weak and debilitated. Remains massively swollen, little change in the last 5 days. 2/6: Status quo. Somnolent but arousable. Desats and dyspneic without AVAPS. Remains massively edematous    2/7:  resp status is status quo. More awake, alert, interactive today. Resumed bumex gtt yesterday. 2/8 for dialysis line in am   Alert oriented   Remain markedly hypervolemic     2/9: Sitting up in bed, on Bipap. Family is present at bedside. Markedly edematous. She is asking me if [facility] placement will hurt. She reports dyspnea, but feels better on Bipap. She denies chest pain or palpitations. Plan is for [facility] catheter placement with IR today. 2/10: Seen while on HD. Tolerating without complications. Tells me she is feeling much better. Still edematous, but edema has improved substantially. She is on nasal canula, eating breakfast. She had 2.7 L removed yesterday and 3L removed today. 2/11: Dialysis today notable for hypotension and only 2.1 L UF. Currently asleep, denies new complaint. Ongoing fatigue, malaise, generalized weakness. 2/12: Somewhat less anxious than she had been over the past several days. Did tolerate roughly 3 hours off of CPAP consecutively yesterday. Still mostly using CPAP throughout the day. Swelling better. 2/13: Seen while in dialysis. Tolerating ultrafiltration without complications. Plan is to remove 3L today. Blood pressure is stable. She is on nasal canula. She tells me that she goes back and forth between nasal canula and CPAP. Plan is for a right sided pleurix catheter to be placed later today.      2/14: Seen while in dialysis. Tolerating treatment without complication. Blood pressure is stable. She is on nasal canula. Tells me she continues to feel better each day. Still with significant abdominal swelling. 2/15:  Laying in bed on BiPap. She has mild shortness of breath. BP remains low. She denies chest pain. Appetite is fair. 2/16: Seen while on hemodialysis. Tolerating treatment without complication. Blood pressure has improved and is stable. She denies dizziness, chest pain, dyspnea, or palpitations. Tells me she is tolerating being off the BIPAP for longer periods. Appetite is good. 2/17 swelling improved   Clinically better  O2 requirement better  Remain weak and deconditioned and basically bedbound     2/18 seen in her HD today with no reported issues o2 requirment continue to improve edema better   Need to work with pt/ot . She is basically bedbound     PROBLEM LIST:    Principal Problem:    Acute on chronic respiratory failure with hypoxia and hypercapnia (HCC)  Active Problems:    Palliative care encounter  Resolved Problems:    * No resolved hospital problems. *         DIET:    ADULT DIET; Regular; 3 carb choices (45 gm/meal); Low Fat/Low Chol/High Fiber/2 gm Na; Moderately Thick (Honey); 1200 ml  ADULT ORAL NUTRITION SUPPLEMENT; Lunch, Dinner;  Other Oral Supplement; Gelatein 20     MEDS (scheduled):    albuterol  2.5 mg Nebulization 4x daily    And    ipratropium  0.5 mg Nebulization 4x daily    ferric gluconate (FERRLECIT) IVPB  125 mg IntraVENous Daily    magnesium oxide  400 mg Oral Daily    midodrine  10 mg Oral TID WC    heparin (porcine)  5,000 Units SubCUTAneous 3 times per day    metoprolol succinate  25 mg Oral Daily    [Held by provider] digoxin  125 mcg IntraVENous Daily    sucralfate  1 g Oral 4x Daily    rOPINIRole  1 mg Oral TID    atorvastatin  20 mg Oral Nightly    [Held by provider] insulin glargine  13 Units SubCUTAneous BID    [Held by provider] LORazepam  0.5 mg Oral Nightly insulin lispro  0-4 Units SubCUTAneous TID WC    insulin lispro  0-4 Units SubCUTAneous Nightly    budesonide  0.5 mg Nebulization BID    sodium chloride flush  10 mL IntraVENous 2 times per day    miconazole   Topical BID    arformoterol tartrate  15 mcg Nebulization BID    pantoprazole  40 mg Oral QAM AC    [Held by provider] apixaban  5 mg Oral BID    mirtazapine  7.5 mg Oral Nightly    carbidopa-levodopa  1 tablet Oral TID    sertraline  50 mg Oral Daily    montelukast  10 mg Oral Nightly       MEDS (infusions):   dextrose      sodium chloride 25 mL/hr at 02/04/23 0222       MEDS (prn):  glucose, dextrose bolus **OR** dextrose bolus, glucagon (rDNA), dextrose, sodium chloride, loperamide, sodium chloride flush, sodium chloride, promethazine **OR** ondansetron, polyethylene glycol, acetaminophen **OR** acetaminophen, glucagon (rDNA)    PHYSICAL EXAM:     Patient Vitals for the past 24 hrs:   BP Temp Temp src Pulse Resp SpO2   02/19/23 1548 104/65 97.9 °F (36.6 °C) Oral 62 18 --   02/19/23 0525 (!) 95/45 98.2 °F (36.8 °C) Oral 74 18 100 %   02/18/23 2057 (!) 116/52 98.1 °F (36.7 °C) Oral 85 18 98 %     @      Intake/Output Summary (Last 24 hours) at 2/19/2023 2007  Last data filed at 2/19/2023 1434  Gross per 24 hour   Intake 520 ml   Output --   Net 520 ml           Wt Readings from Last 3 Encounters:   02/16/23 216 lb 0.8 oz (98 kg)   01/16/23 259 lb (117.5 kg)   01/16/23 259 lb (117.5 kg)     Age-appropriate morbidly obese white woman, though easily arousable, no apparent distress  NC/AT EOMI sclera conjunctive are clear and anicteric mucous membranes are moist  Neck soft supple trachea midline  Lungs:  Diminished bilaterally. Poor inspiratory effort. Regular rhythm normal S1 and S2  Abdomen soft nontender nondistended normal active bowel sounds. Abdominal pannus has substantial edema (+3 pitting)  Distal extremities, thighs, sacrum have substantial chronic type nonpitting edema, +1/trace BLE edema. Pulses 1+ x4  Moves all 4 extremities  Cranial nerves II through XII grossly intact  Awake, alert, interactive and appropriate  Skin tone consistent with chronic venous stasis distally      DATA:    Recent Labs     02/17/23  0920 02/18/23  0930 02/19/23  0729   WBC 10.3 7.9 8.0   HGB 8.1* 7.6* 8.1*   HCT 28.8* 28.4* 29.7*   MCV 91.4 93.7 90.3    174 134           Recent Labs     02/17/23  0920 02/18/23  0930 02/19/23  0729    137 135   K 4.7 3.7 4.0   CL 98 97* 95*   CO2 26 30* 31*   MG 1.7  --   --    PHOS 2.6  --   --    BUN 18 24* 17   CREATININE 2.6* 3.2* 2.5*   ALT <5 <5 <5   AST 12 6 14   BILITOT 0.6 0.5 0.8   ALKPHOS 53 55 59         Lab Results   Component Value Date    LABPROT 0.5 (H) 01/18/2023    LABPROT 0.5 01/18/2023     On CT no hydro/signs of obstruction     Urine studies  U Na 21, U Cl 23 U prot:cr ratio 0.5    ASSESSMENT / RECOMMENDATIONS:       Assessment  1. Acute kidney injury urine lytes support decrease effective volume     2. CKD stage III, Baseline creatinine 1.4 to 1.7 mg/dL, secondary to diabetic nephropathy and renal microvascular atherosclerotic disease. 0.5 g proteinuria     3. Anemia due to CKD, phlebotomy associated prolonged hospitalization     4. Acute hypercapnic and hypoxic respiratory failure due to COPD exacerbation and pneumonia. Does not appear hypervolemic. 5.  Morbid obesity, BMI 50.6 kg per metered square     6. Edema/anasarca, chronic, multifactorial, due to venous insufficiency in the setting of morbid obesity, relative immobility, likely diastolic dysfunction though it was \"indeterminate\" on echo, the does have pulmonary hypertension, mild right-sided heart failure    7. Hypotension    8.   Elevated bicarbonate represents compensation for chronic respiratory acidosis, as well as likely contraction alkalosis due to diuresis on the Bumex drip           Recommendations  -continue Renal replacement therapy support for UF and solute purposes  -place on HD schedule MWF   -Continue Midodrine 10 mg TID   -PT/OT   -SW for HD unit placement      Electronically signed by Jimmie Chaparro MD on 2/19/2023

## 2023-02-21 LAB
METER GLUCOSE: 146 MG/DL (ref 74–99)
METER GLUCOSE: 154 MG/DL (ref 74–99)
METER GLUCOSE: 162 MG/DL (ref 74–99)

## 2023-02-21 PROCEDURE — 80074 ACUTE HEPATITIS PANEL: CPT

## 2023-02-21 PROCEDURE — 2700000000 HC OXYGEN THERAPY PER DAY

## 2023-02-21 PROCEDURE — 6370000000 HC RX 637 (ALT 250 FOR IP): Performed by: INTERNAL MEDICINE

## 2023-02-21 PROCEDURE — 6360000002 HC RX W HCPCS: Performed by: NURSE PRACTITIONER

## 2023-02-21 PROCEDURE — 99232 SBSQ HOSP IP/OBS MODERATE 35: CPT | Performed by: INTERNAL MEDICINE

## 2023-02-21 PROCEDURE — 97535 SELF CARE MNGMENT TRAINING: CPT

## 2023-02-21 PROCEDURE — 82962 GLUCOSE BLOOD TEST: CPT

## 2023-02-21 PROCEDURE — 6370000000 HC RX 637 (ALT 250 FOR IP): Performed by: NURSE PRACTITIONER

## 2023-02-21 PROCEDURE — 94640 AIRWAY INHALATION TREATMENT: CPT

## 2023-02-21 PROCEDURE — 6360000002 HC RX W HCPCS: Performed by: INTERNAL MEDICINE

## 2023-02-21 PROCEDURE — 94660 CPAP INITIATION&MGMT: CPT

## 2023-02-21 PROCEDURE — 97530 THERAPEUTIC ACTIVITIES: CPT

## 2023-02-21 PROCEDURE — 36415 COLL VENOUS BLD VENIPUNCTURE: CPT

## 2023-02-21 PROCEDURE — 97110 THERAPEUTIC EXERCISES: CPT

## 2023-02-21 PROCEDURE — 2580000003 HC RX 258: Performed by: INTERNAL MEDICINE

## 2023-02-21 PROCEDURE — 2060000000 HC ICU INTERMEDIATE R&B

## 2023-02-21 RX ADMIN — SERTRALINE 50 MG: 50 TABLET, FILM COATED ORAL at 10:00

## 2023-02-21 RX ADMIN — CARBIDOPA AND LEVODOPA 1 TABLET: 50; 200 TABLET, EXTENDED RELEASE ORAL at 14:09

## 2023-02-21 RX ADMIN — ACETAMINOPHEN 650 MG: 325 TABLET ORAL at 23:00

## 2023-02-21 RX ADMIN — PANTOPRAZOLE SODIUM 40 MG: 40 TABLET, DELAYED RELEASE ORAL at 06:25

## 2023-02-21 RX ADMIN — SUCRALFATE 1 G: 1 TABLET ORAL at 11:21

## 2023-02-21 RX ADMIN — ATORVASTATIN CALCIUM 20 MG: 20 TABLET, FILM COATED ORAL at 20:46

## 2023-02-21 RX ADMIN — ARFORMOTEROL TARTRATE 15 MCG: 15 SOLUTION RESPIRATORY (INHALATION) at 07:09

## 2023-02-21 RX ADMIN — SUCRALFATE 1 G: 1 TABLET ORAL at 09:59

## 2023-02-21 RX ADMIN — ROPINIROLE HYDROCHLORIDE 1 MG: 1 TABLET, FILM COATED ORAL at 10:00

## 2023-02-21 RX ADMIN — ANTI-FUNGAL POWDER MICONAZOLE NITRATE TALC FREE: 1.42 POWDER TOPICAL at 20:47

## 2023-02-21 RX ADMIN — ANTI-FUNGAL POWDER MICONAZOLE NITRATE TALC FREE: 1.42 POWDER TOPICAL at 10:00

## 2023-02-21 RX ADMIN — MIDODRINE HYDROCHLORIDE 10 MG: 5 TABLET ORAL at 17:12

## 2023-02-21 RX ADMIN — Medication 10 ML: at 10:02

## 2023-02-21 RX ADMIN — HEPARIN SODIUM 5000 UNITS: 5000 INJECTION INTRAVENOUS; SUBCUTANEOUS at 14:09

## 2023-02-21 RX ADMIN — MIRTAZAPINE 7.5 MG: 15 TABLET, FILM COATED ORAL at 20:46

## 2023-02-21 RX ADMIN — BUDESONIDE 500 MCG: 0.5 SUSPENSION RESPIRATORY (INHALATION) at 18:38

## 2023-02-21 RX ADMIN — ALBUTEROL SULFATE 2.5 MG: 2.5 SOLUTION RESPIRATORY (INHALATION) at 07:08

## 2023-02-21 RX ADMIN — ALBUTEROL SULFATE 2.5 MG: 2.5 SOLUTION RESPIRATORY (INHALATION) at 13:44

## 2023-02-21 RX ADMIN — ROPINIROLE HYDROCHLORIDE 1 MG: 1 TABLET, FILM COATED ORAL at 20:46

## 2023-02-21 RX ADMIN — ALBUTEROL SULFATE 2.5 MG: 2.5 SOLUTION RESPIRATORY (INHALATION) at 10:16

## 2023-02-21 RX ADMIN — MAGNESIUM OXIDE 400 MG: 400 TABLET ORAL at 10:00

## 2023-02-21 RX ADMIN — CARBIDOPA AND LEVODOPA 1 TABLET: 50; 200 TABLET, EXTENDED RELEASE ORAL at 09:59

## 2023-02-21 RX ADMIN — IPRATROPIUM BROMIDE 0.5 MG: 0.5 SOLUTION RESPIRATORY (INHALATION) at 07:08

## 2023-02-21 RX ADMIN — METOPROLOL SUCCINATE 25 MG: 25 TABLET, FILM COATED, EXTENDED RELEASE ORAL at 10:00

## 2023-02-21 RX ADMIN — HEPARIN SODIUM 5000 UNITS: 5000 INJECTION INTRAVENOUS; SUBCUTANEOUS at 23:01

## 2023-02-21 RX ADMIN — ONDANSETRON 4 MG: 2 INJECTION INTRAMUSCULAR; INTRAVENOUS at 11:20

## 2023-02-21 RX ADMIN — MIDODRINE HYDROCHLORIDE 10 MG: 5 TABLET ORAL at 11:21

## 2023-02-21 RX ADMIN — SUCRALFATE 1 G: 1 TABLET ORAL at 20:46

## 2023-02-21 RX ADMIN — HEPARIN SODIUM 5000 UNITS: 5000 INJECTION INTRAVENOUS; SUBCUTANEOUS at 06:24

## 2023-02-21 RX ADMIN — ARFORMOTEROL TARTRATE 15 MCG: 15 SOLUTION RESPIRATORY (INHALATION) at 18:38

## 2023-02-21 RX ADMIN — CARBIDOPA AND LEVODOPA 1 TABLET: 50; 200 TABLET, EXTENDED RELEASE ORAL at 20:46

## 2023-02-21 RX ADMIN — ALBUTEROL SULFATE 2.5 MG: 2.5 SOLUTION RESPIRATORY (INHALATION) at 18:38

## 2023-02-21 RX ADMIN — ROPINIROLE HYDROCHLORIDE 1 MG: 1 TABLET, FILM COATED ORAL at 14:09

## 2023-02-21 RX ADMIN — BUDESONIDE 500 MCG: 0.5 SUSPENSION RESPIRATORY (INHALATION) at 07:09

## 2023-02-21 RX ADMIN — MIDODRINE HYDROCHLORIDE 10 MG: 5 TABLET ORAL at 06:25

## 2023-02-21 RX ADMIN — IPRATROPIUM BROMIDE 0.5 MG: 0.5 SOLUTION RESPIRATORY (INHALATION) at 10:16

## 2023-02-21 RX ADMIN — IPRATROPIUM BROMIDE 0.5 MG: 0.5 SOLUTION RESPIRATORY (INHALATION) at 18:38

## 2023-02-21 RX ADMIN — IPRATROPIUM BROMIDE 0.5 MG: 0.5 SOLUTION RESPIRATORY (INHALATION) at 13:43

## 2023-02-21 RX ADMIN — Medication 10 ML: at 23:00

## 2023-02-21 RX ADMIN — MONTELUKAST SODIUM 10 MG: 10 TABLET, FILM COATED ORAL at 20:46

## 2023-02-21 RX ADMIN — SUCRALFATE 1 G: 1 TABLET ORAL at 17:13

## 2023-02-21 ASSESSMENT — PAIN SCALES - GENERAL
PAINLEVEL_OUTOF10: 0
PAINLEVEL_OUTOF10: 0

## 2023-02-21 NOTE — PROGRESS NOTES
Comprehensive Nutrition Assessment    Type and Reason for Visit:  Reassess    Nutrition Recommendations/Plan:   Continue current nutrition regimen. Will continue to monitor. Malnutrition Assessment:  Malnutrition Status: At risk for malnutrition (Comment) (01/20/23 1432)    Context:  Chronic Illness     Findings of the 6 clinical characteristics of malnutrition:  Energy Intake:  Mild decrease in energy intake (Comment)  Weight Loss:  Unable to assess     Body Fat Loss:  No significant body fat loss     Muscle Mass Loss:  No significant muscle mass loss    Fluid Accumulation:  Unable to assess     Strength:  Not Performed    Nutrition Assessment:    Pt at nutritional risk w/ prolonged hospital stay & increased needs 2/2 new HD w/ TDC placement 2/2 adm w/ SHANTANU on CKD, reps failure/COPD/PNA,  pleural effusion s/p thoracentesis. Note UTI. Hx CHF, DM, Parkinson's. Continue current nutrition regimen & monitor. Nutrition Related Findings:    anxious, A&Ox4, soft abd, +bs, +1 edema, -18.7L I/Os   Wound Type: None       Current Nutrition Intake & Therapies:    Average Meal Intake: 51-75%  Average Supplements Intake: 1-25%  ADULT DIET; Regular; 3 carb choices (45 gm/meal); Low Fat/Low Chol/High Fiber/2 gm Na; Moderately Thick (Honey); 1200 ml  ADULT ORAL NUTRITION SUPPLEMENT; Lunch, Dinner; Other Oral Supplement; Gelatein 20    Anthropometric Measures:  Height: 5' (152.4 cm)  Ideal Body Weight (IBW): 100 lbs (45 kg)    Admission Body Weight: 262 lb 2 oz (118.9 kg) (1/17 bed scale)  Current Body Weight: 215 lb (97.5 kg) (2/21 bed scale), 273.3 % IBW.  Weight Source: Bed Scale  Current BMI (kg/m2): 44.1  Usual Body Weight:  (JOHN d/t wt fluctuations in EMR)  Weight Adjustment For: No Adjustment  BMI Categories: Obese Class 3 (BMI 40.0 or greater)    Estimated Daily Nutrient Needs:  Energy Requirements Based On: Formula  Weight Used for Energy Requirements: Current  Energy (kcal/day): 4287-4368  Weight Used for Protein Requirements: Ideal  Protein (g/day): 100-110  Method Used for Fluid Requirements: 1 ml/kcal  Fluid (ml/day): per doctor (1200ml restriction)    Nutrition Diagnosis:   Increased nutrient needs related to increase demand for energy/nutrients as evidenced by dialysis, intake 51-75%    Nutrition Interventions:   Food and/or Nutrient Delivery: Continue Current Diet, Continue Oral Nutrition Supplement  Nutrition Education/Counseling: No recommendation at this time  Coordination of Nutrition Care: Continue to monitor while inpatient    Goals:  Previous Goal Met: Goal(s) Achieved (new goal)  Goals: PO intake 75% or greater  Specify Other Goals: monitor nutrition progression    Nutrition Monitoring and Evaluation:   Behavioral-Environmental Outcomes: None Identified  Food/Nutrient Intake Outcomes: Food and Nutrient Intake, Supplement Intake  Physical Signs/Symptoms Outcomes: Biochemical Data, Nutrition Focused Physical Findings, Skin, Weight, Chewing or Swallowing, GI Status, Fluid Status or Edema    Discharge Planning:     Too soon to determine     Matt Luna MS, RD, LD  Contact: [phone]

## 2023-02-21 NOTE — CARE COORDINATION
SS NOTE: COVID (-) 1/17- PT WILL NEED A RAPID NEGATIVE COVID TEST TO RETURN TO [facility] AT [facility]. O2 at 9 heated umang flow vs bipap FIO2 50%. . Palliative re-consulted. NEW HD this admit with tunneled catheter in place. Pleurx cath placed 2/14 with 1000cc removed. Pt is a long term care Resident at [facility]. [facility] will accept pt to return there on up to 10L heated high flow O2, bipap FIO2 at 50% and they can provide transport for outpt HD and they can also accept and care for the pleurx cath. - however they will need specific orders. SW began referral to [facility] today to obtain an outpt chair schedule- AWAITING HEPATITIS PANEL RESULT TO COMPLETE THAT REFERRAL. For the return to [facility] pt will need NO PRECERT, a SIGNED [place]. Insurance has declined LTAC. SW to continue. ESTHER Diaz.2/21/2023.10:34 AM.

## 2023-02-21 NOTE — PROGRESS NOTES
[facility] MD Silvestre Beckford MD Cesario Sams, MD Prudy Pick, BRADLEY Devlin, NUNU Alonzo, BRADLEY  Progress Note    2/21/2023    SUBJECTIVE:   1/20: Feeling little better today. Denies new complaint. Still tachypnea, though denies dyspnea no cp/palp Appetite ok ROS otherwise unremarkable    1//21 seen in her room on o2/nc bp stable weak poor po intake   2+ edema in LE     1/22 charts reviewed . On bipap    1/23 : on o2/nc . Still look hypervolemic     1/24 seen in her room comfortable o2/nc . Still with LE edema which seems to be multifactorial and will likely need to accept having some edema on board     1/25 sitting on edge of the bed working with pt . Still with considerable edema in LEs   BP stable     1/26 seen in her room reports of SOB with minimal activity , LE edema still signficant . 1/27 good UO On 5 L/o2/nc  Still with sob with activity Tachycardiac with low functional capacity     1/28: Asleep, resting and breathing comfortably on nasal cannula. Remains edematous. Ongoing fatigue and generalized weakness. 1/29: Hypotensive last evening, Bumex drip stopped, IV normal saline bolus administered to effect. Breathing little more easily though still on AVAPS. To be downgraded to CPAP shortly. Thirsty. Still markedly edematous. 1/30: Somnolent, though arousable. On CPAP. Ongoing fatigue and malaise with generalized weakness    1/31 seen in her room , skin wrinkling in LE on HFNC . BP on lower side  For thoracentesis today   Dw RN .     2/1: Seen while laying in bed, no acute distress. Tells me that she is thirsty. She is on heated high flow nasal canula. Feels that her breathing has improved slightly. Plan is for thoracentesis later this afternoon, could not be done yesterday due to elevated INR. Still with skin wrinkling to bilateral lower extremities.      2/2 1 L removed with thoracentesis   O2 requirement better on 8 L o2/nc Very weak and deconditioned   Labile BP numbers    2/2 o2/nc LE UO ok remain weak decondtioned . Edema in LE getting somewhat worse    2/4 remain with significant depdent edema deconditioned in bed     2/5: Discussed with respiratory therapist: Just put on her CPAP after having tolerated nasal cannula throughout most of the day. Remains bedbound, severely weak and debilitated. Remains massively swollen, little change in the last 5 days. 2/6: Status quo. Somnolent but arousable. Desats and dyspneic without AVAPS. Remains massively edematous    2/7:  resp status is status quo. More awake, alert, interactive today. Resumed bumex gtt yesterday. 2/8 for dialysis line in am   Alert oriented   Remain markedly hypervolemic     2/9: Sitting up in bed, on Bipap. Family is present at bedside. Markedly edematous. She is asking me if [facility] placement will hurt. She reports dyspnea, but feels better on Bipap. She denies chest pain or palpitations. Plan is for [facility] catheter placement with IR today. 2/10: Seen while on HD. Tolerating without complications. Tells me she is feeling much better. Still edematous, but edema has improved substantially. She is on nasal canula, eating breakfast. She had 2.7 L removed yesterday and 3L removed today. 2/11: Dialysis today notable for hypotension and only 2.1 L UF. Currently asleep, denies new complaint. Ongoing fatigue, malaise, generalized weakness. 2/12: Somewhat less anxious than she had been over the past several days. Did tolerate roughly 3 hours off of CPAP consecutively yesterday. Still mostly using CPAP throughout the day. Swelling better. 2/13: Seen while in dialysis. Tolerating ultrafiltration without complications. Plan is to remove 3L today. Blood pressure is stable. She is on nasal canula. She tells me that she goes back and forth between nasal canula and CPAP. Plan is for a right sided pleurix catheter to be placed later today.      2/14: Seen while in dialysis. Tolerating treatment without complication. Blood pressure is stable. She is on nasal canula. Tells me she continues to feel better each day. Still with significant abdominal swelling. 2/15:  Laying in bed on BiPap. She has mild shortness of breath. BP remains low. She denies chest pain. Appetite is fair. 2/16: Seen while on hemodialysis. Tolerating treatment without complication. Blood pressure has improved and is stable. She denies dizziness, chest pain, dyspnea, or palpitations. Tells me she is tolerating being off the BIPAP for longer periods. Appetite is good. 2/17 swelling improved   Clinically better  O2 requirement better  Remain weak and deconditioned and basically bedbound     2/18 seen in her HD today with no reported issues o2 requirment continue to improve edema better   Need to work with pt/ot . She is basically bedbound     2/20: Seen while laying in bed. Tolerated HD earlier today. 1 liter of fluid removed. Her edema has improved substantially. She tells me she is feeling much better. She is on nasal canula. She tells me she is still making urine. PROBLEM LIST:    Principal Problem:    Acute on chronic respiratory failure with hypoxia and hypercapnia (HCC)  Active Problems:    Palliative care encounter  Resolved Problems:    * No resolved hospital problems. *         DIET:    ADULT DIET; Regular; 3 carb choices (45 gm/meal); Low Fat/Low Chol/High Fiber/2 gm Na; Moderately Thick (Honey); 1200 ml  ADULT ORAL NUTRITION SUPPLEMENT; Lunch, Dinner;  Other Oral Supplement; Gelatein 20     MEDS (scheduled):    epoetin josé luis-epbx  3,000 Units SubCUTAneous Once per day on Mon Wed Fri    albuterol  2.5 mg Nebulization 4x daily    And    ipratropium  0.5 mg Nebulization 4x daily    magnesium oxide  400 mg Oral Daily    midodrine  10 mg Oral TID WC    heparin (porcine)  5,000 Units SubCUTAneous 3 times per day    metoprolol succinate  25 mg Oral Daily    [Held by provider] digoxin  125 mcg IntraVENous Daily    sucralfate  1 g Oral 4x Daily    rOPINIRole  1 mg Oral TID    atorvastatin  20 mg Oral Nightly    [Held by provider] insulin glargine  13 Units SubCUTAneous BID    [Held by provider] LORazepam  0.5 mg Oral Nightly    insulin lispro  0-4 Units SubCUTAneous TID WC    insulin lispro  0-4 Units SubCUTAneous Nightly    budesonide  0.5 mg Nebulization BID    sodium chloride flush  10 mL IntraVENous 2 times per day    miconazole   Topical BID    arformoterol tartrate  15 mcg Nebulization BID    pantoprazole  40 mg Oral QAM AC    [Held by provider] apixaban  5 mg Oral BID    mirtazapine  7.5 mg Oral Nightly    carbidopa-levodopa  1 tablet Oral TID    sertraline  50 mg Oral Daily    montelukast  10 mg Oral Nightly       MEDS (infusions):   dextrose      sodium chloride 25 mL/hr at 02/04/23 0222       MEDS (prn):  glucose, dextrose bolus **OR** dextrose bolus, glucagon (rDNA), dextrose, sodium chloride, loperamide, sodium chloride flush, sodium chloride, promethazine **OR** ondansetron, polyethylene glycol, acetaminophen **OR** acetaminophen, glucagon (rDNA)    PHYSICAL EXAM:     Patient Vitals for the past 24 hrs:   BP Temp Temp src Pulse Resp SpO2 Weight   02/21/23 1003 (!) 104/48 98 °F (36.7 °C) -- 59 16 100 % --   02/21/23 0615 (!) 146/81 98 °F (36.7 °C) Oral 50 18 99 % --   02/21/23 0121 -- -- -- -- -- -- 215 lb (97.5 kg)   02/20/23 2000 (!) 103/49 97.5 °F (36.4 °C) Oral 55 17 97 % --     @      Intake/Output Summary (Last 24 hours) at 2/21/2023 1252  Last data filed at 2/21/2023 1150  Gross per 24 hour   Intake 120 ml   Output 0 ml   Net 120 ml           Wt Readings from Last 3 Encounters:   02/21/23 215 lb (97.5 kg)   01/16/23 259 lb (117.5 kg)   01/16/23 259 lb (117.5 kg)     Age-appropriate morbidly obese white woman, though easily arousable, no apparent distress  NC/AT EOMI sclera conjunctive are clear and anicteric mucous membranes are moist  Neck soft supple trachea midline  Lungs:  Diminished bilaterally. Poor inspiratory effort. Right pleurix catheter   Regular rhythm normal S1 and S2  Abdomen soft nontender nondistended normal active bowel sounds. Abdominal pannus has substantial edema (+1 pitting)  Distal extremities, thighs, sacrum have substantial chronic type nonpitting edema, trace BLE edema. Pulses 1+ x4  Moves all 4 extremities  Cranial nerves II through XII grossly intact  Awake, alert, interactive and appropriate  Skin tone consistent with chronic venous stasis distally      DATA:    Recent Labs     02/19/23  0729 02/20/23  1342   WBC 8.0 7.3   HGB 8.1* 7.6*   HCT 29.7* 26.6*   MCV 90.3 88.4    114*           Recent Labs     02/19/23  0729 02/20/23  1342    137   K 4.0 3.6   CL 95* 98   CO2 31* 29   BUN 17 9   CREATININE 2.5* 1.5*   ALT <5 <5   AST 14 7   BILITOT 0.8 0.7   ALKPHOS 59 53         Lab Results   Component Value Date    LABPROT 0.5 (H) 01/18/2023    LABPROT 0.5 01/18/2023     On CT no hydro/signs of obstruction     Urine studies  U Na 21, U Cl 23 U prot:cr ratio 0.5    ASSESSMENT / RECOMMENDATIONS:       Assessment  1. Acute kidney injury urine lytes support decrease effective volume     2. CKD stage III, Baseline creatinine 1.4 to 1.7 mg/dL, secondary to diabetic nephropathy and renal microvascular atherosclerotic disease. 0.5 g proteinuria     3. Anemia due to CKD, phlebotomy associated prolonged hospitalization     4. Acute hypercapnic and hypoxic respiratory failure due to COPD exacerbation and pneumonia. Does not appear hypervolemic. 5.  Morbid obesity, BMI 50.6 kg per metered square     6. Edema/anasarca, chronic, multifactorial, due to venous insufficiency in the setting of morbid obesity, relative immobility, likely diastolic dysfunction though it was \"indeterminate\" on echo, the does have pulmonary hypertension, mild right-sided heart failure    7. Hypotension    8.   Elevated bicarbonate represents compensation for chronic respiratory acidosis, as well as likely contraction alkalosis due to diuresis on the Bumex drip       Recommendations  -continue Renal replacement therapy support for UF and solute purposes  -continue  HD schedule MWF   -Start Retacrit   -Continue Midodrine 10 mg TID   -PT/OT   -SW for HD unit placement      Electronically signed by Tootie Block MD on 2/21/2023

## 2023-02-21 NOTE — PROGRESS NOTES
OCCUPATIONAL THERAPY BEDSIDE TREATMENT NOTE   [address] [facility]  [facility]. [place]    Date:2023  Patient Name: uAgusto Hope"  MRN: [id number]  : 1950  Room: [address] [place] [address] Min[place], OTR/L; [id number]        Referring Provider and Orders/Date:    OT eval and treat  Start:  23,   End:  23,   ONE TIME,   Standing Count:  1 Occurrences,   Mira Otto MD         Diagnosis:   1. Atrial fibrillation with rapid ventricular response ([address]    2. Acute on chronic respiratory failure with hypoxia and hypercapnia (HCC)    3. Recurrent right pleural effusion    4. NSTEMI (non-ST elevated myocardial infarction) ([address]    5.  Hypotension, unspecified hypotension type          Surgery: none      Pertinent Medical History:                Past Medical History:   Diagnosis Date    A-fib ([address]      Acute on chronic congestive heart failure (HCC)      Anxiety      Asthma      CAD (coronary artery disease) 2016    Cancer ([address]  breast ca 2006     right lumpectomy    Chronic kidney disease       nephrolithiasis    COPD exacerbation ([address] 10/12/2022    Depression      Diabetes mellitus ([address]      H/O mammogram      Hx MRSA infection       toe infection 2012    Hyperlipidemia      Hypertension      Lateral epicondylitis      Morbid obesity (HCC)      SERENA on CPAP      Oxygen dependent      Parkinson's disease ([address]      Tubal ligation status                      Past Surgical History:   Procedure Laterality Date    BREAST LUMPECTOMY        BREAST REDUCTION SURGERY        CARDIAC CATHETERIZATION   2014     Dr. Baron Keith   2022     Dr. Shalom Fortune   7/29/15    CT GUIDED CHEST TUBE   2022     CT GUIDED CHEST TUBE 2022 Erika Cheung MD [facility] CT    ENDOSCOPY, COLON, DIAGNOSTIC   7/19/15    GALLBLADDER SURGERY        LUMBAR LAMINECTOMY        TOE AMPUTATION        TONSILLECTOMY        UPPER GASTROINTESTINAL ENDOSCOPY        UPPER GASTROINTESTINAL ENDOSCOPY N/A 7/19/2022     EGD ESOPHAGOGASTRODUODENOSCOPY performed by Collette Hobbs MD at [facility] ENDOSCOPY        Precautions:  Fall Risk, 1L O2, contact for VRE    Recommended placement: subacute    Assessment of current deficits     [x] Functional mobility           [x]ADLs           [x] Strength                  []Cognition     [x] Functional transfers         [x] IADLs         [x] Safety Awareness   [x]Endurance     [] Fine Coordination                        [x] Balance      [] Vision/perception   []Sensation       [x]Gross Motor Coordination            [] ROM           [] Delirium                   [] Motor Control      OT PLAN OF CARE   OT POC based on physician orders, patient diagnosis and results of clinical assessment     Frequency/Duration 1-3 days/wk for 2 weeks PRN   Specific OT Treatment Interventions to include:   * Instruction/training on adapted ADL techniques and AE recommendations to increase functional independence within precautions       * Training on energy conservation strategies, correct breathing pattern and techniques to improve independence/tolerance for self-care routine  * Functional transfer/mobility training/DME recommendations for increased independence, safety, and fall prevention  * Patient/Family education to increase follow through with safety techniques and functional independence  * Recommendation of environmental modifications for increased safety with functional transfers/mobility and ADLs  * Therapeutic exercise to improve motor endurance, ROM, and functional strength for ADLs/functional transfers  * Therapeutic activities to facilitate/challenge dynamic balance, stand tolerance for increased safety and independence with ADLs  * Therapeutic activities to facilitate gross/fine motor skills for increased independence with ADLs  * Neuro-muscular re-education: facilitation of righting/equilibrium reactions, midline orientation, scapular stability/mobility, normalization of muscle tone, and facilitation of volitional active controled movement  * Positioning to improve skin integrity, interaction with environment and functional independence      Recommended Adaptive Equipment/DME:  TBD       Home Living: Pt is a long term resident of a SNF and plans to return at [place]. Bathroom setup: roll in shower chair; grab bars by the toilet and 3:1 over top for safety. DME owned: wc and walker      Prior Level of Function: assist with ADLs , dep with IADLs; ambulated with wc mostly, but transferring with ww and assist   Driving: no   Occupation: none   Enjoys: reading, socializing, phone-games     Pain Level: no c/o pain at this time; pt c/o nausea and dizziness; nsg notified  Cognition: A&O: 3/4 not situation; Follows 2 step directions-limited with dizziness              Memory:  fair              Sequencing:  fair              Problem solving:  fair-              Judgement/safety:  fair-     [facility] Daily Activity Inpatient   How much help for putting on and taking off regular lower body clothing?: Total  How much help for Bathing?: A Lot  How much help for Toileting?: Total  How much help for putting on and taking off regular upper body clothing?: A Lot  How much help for taking care of personal grooming?: A Little  How much help for eating meals?: A Little  [facility] Inpatient Daily Activity Raw Score: 12  AM-[facility] Inpatient ADL T-Scale Score : 30.6  ADL Inpatient CMS 0-100% Score: 66.57  ADL Inpatient CMS G-Code Modifier : CL     Functional Assessment:      Initial Eval Status  Date: 1/20/2023   Treatment Status  Date: 2/21/23 STGs = LTGs  Time frame: 10-14 days   Feeding Minimal Assist with modified diet, O2 limitations and fatigue. N/t ; pt on fluid restriction per nsg; pt on honey thickened liquid  Set up   Grooming Moderate Assist with limitations due to bipap. Set up assist to wash face when seated in bed; min A to brush hair when seated EOB; pt declined oral hygiene Stand by Assist    UB Dressing Maximal Assist with gown management from supine Mod A with gown management, as well as to thread monitor lines around gown; assist to tie/untie back of gown  Moderate Assist    LB Dressing Dependent with socks and heel protectors from supine Dep to don socks and don/doff heel protectors when long seated in bed Maximal Assist    Bathing Maximal Assist from supine level Mod A; pt able to wash UB when seated in bed; assist for pericare and to wash LE's when seated in bed; assist to wash back when seated EOB Moderate Assist    Toileting Dependent with genao management Dep for pericare; pt attempted to reach, however required assist for thoroughness Maximal Assist    Bed Mobility  Pt had just returned to supine after sitting EOB with PT-reported max A with dropping vitals. Body mobility and positioning with max A for comfort and upright posture Min A/Mod A for supine to sit; min A for scooting; mod A for sit to supine; assist to guide UB to sitting and LE's to supine; HOB elevated at end of session Supine to sit: Minimal Assist   Sit to supine: Minimal Assist    Functional Transfers NT due to pt overall debility, decreased activity tolerance, balance deficits, safety and fall risk. Dropping O2 with supine tasks Nt due to pt c/o dizziness and nausea; pt's SPO2 sats on 1L O2 dropped to 86% when seated EOB, however quickly returned to 90-93% in less than  1 minute; nsg notified Moderate Assist    Functional Mobility Not appropriate at time of eval. To re-assess at a later time if appropriate. NT Not appropriate at time of eval. To re-assess at a later time if appropriate.         Balance Sitting:     Static:  NT    Dynamic:NT  Standing: NT Sitting:     Static:  fair at EOB for ~6 minutes with min A for upright position    Dynamic:NT  Standing: NT  Sitting:     Static:  fair Dynamic:fair-  Standing: poor+   Activity Tolerance Vitals with activity: cont bipap 88-90%; 151/84 HR 67; 129/70 with ADLs, 88% HR 70; poor tolerance to therapy overall with pt very fatigued and falling asleep. Unable to tolerate sitting with OT session. Fair; pt limited d/t dizziness and nausea during session Increase sitting tolerance for >15min with stable vital signs for carry over into toileting, functional tranfers and indep in ADLs   Visual/  Perceptual Glasses: not Present; WFL     Reports change in vision since admission: No      NA        Comments: Patient cleared by nursing staff. Upon arrival pt seated in bed with HOB elevated. Pt agreeable to OT tx session. Pt educated with regards to bed mobility, hand placement, safety awareness, static sitting balance,  ADL retraining,  grooming tasks, UE/LE bathing, LE/UE dressing, pericare, ECT's. Pt tolerated sitting EOB for ~6 minutes with min A with SPO2 sats dropping to 86%; deep breathing with SPO2 sats on 1L increased to 90-93% in ~1 minute. At end of session pt seated in bed with HOB elevated  with all lines and tubes intact, call light within reach. Overall, pt demonstrated decreased independence and safety during completion of ADL/functional transfers/mobility tasks. Pt would benefit from continued skilled OT to increase safety and independence with completion of ADL/IADL tasks for functional independence and quality of life. Pt required cues and education as noted above for safe facilitation and completion of tasks. Therapist provided skilled monitoring of patient's response during treatment session. Prior to and at the end of session, environmental modifications / line management completed for patients safety and efficiency of treatment session. Overall, patient demonstrates moderate difficulties with completion of BADLs and IADLs.  Factors contributing to these difficulties include dizziness, nausea, decreased endurance, and generalized weakness. As noted above, patient likely to benefit from further OT intervention to increase independence, safety, and overall quality of life. Treatment:     Bed mobility: Facilitated bed mobility with cues for proper body mechanics and sequencing to prepare for ADL completion. ADL completion: Self-care retraining for the above-mentioned ADLs; training on proper hand placement, safety technique, sequencing, and energy conservation techniques. Postural Balance: Sitting balance retraining to improve righting reactions with postural changes during ADLs. Skilled positioning: Proper positioning to improve interaction with environment, overall functioning and decrease/prevent edema        Pt has made fair progress towards set goals   OT 1-3 days/wk for 2 weeks PRN     Treatment Time also includes thorough review of current medical information, gathering information on past medical history/social history and prior level of function, informal observation of tasks, assessment of data and education on plan of care and goals.     Treatment Time In: 10:27 AM     Treatment Time Out: 11:02 AM           Treatment Charges: Mins Units   ADL/Home Mgt     68532 20 1   Thera Activities     [id number] 15 1   Ther Ex                 [id number]       Manual Therapy    11224     Neuro Re-ed         [id number]     Orthotic manage/training                               56203     Non Billable Time     Total Timed Treatment 35 [phone] [address]

## 2023-02-21 NOTE — PROGRESS NOTES
Department of Internal Medicine  General Internal Medicine  Attending Progress Note  Chief Complaint   Patient presents with    Respiratory Distress     Normally on 3L NC, came in on CPAP, given total of 50mcg push dose epi for low BP by EMS     SUBJECTIVE:    Reports that she is gradually getting. Denied fever and chills. No chest pain.      OBJECTIVE      Medications    Current Facility-Administered Medications: epoetin josé luis-epbx (RETACRIT) injection 3,000 Units, 3,000 Units, SubCUTAneous, Once per day on Mon Wed Fri  albuterol (PROVENTIL) nebulizer solution 2.5 mg, 2.5 mg, Nebulization, 4x daily **AND** ipratropium (ATROVENT) 0.02 % nebulizer solution 0.5 mg, 0.5 mg, Nebulization, 4x daily  glucose chewable tablet 16 g, 4 tablet, Oral, PRN  dextrose bolus 10% 125 mL, 125 mL, IntraVENous, PRN **OR** dextrose bolus 10% 250 mL, 250 mL, IntraVENous, PRN  glucagon (rDNA) injection 1 mg, 1 mg, SubCUTAneous, PRN  dextrose 10 % infusion, , IntraVENous, Continuous PRN  magnesium oxide (MAG-OX) tablet 400 mg, 400 mg, Oral, Daily  midodrine (PROAMATINE) tablet 10 mg, 10 mg, Oral, TID WC  heparin (porcine) injection 5,000 Units, 5,000 Units, SubCUTAneous, 3 times per day  metoprolol succinate (TOPROL XL) extended release tablet 25 mg, 25 mg, Oral, Daily  [Held by provider] digoxin (LANOXIN) injection 125 mcg, 125 mcg, IntraVENous, Daily  sodium chloride (OCEAN, BABY AYR) 0.65 % nasal spray 1 spray, 1 spray, Each Nostril, PRN  sucralfate (CARAFATE) tablet 1 g, 1 g, Oral, 4x Daily  rOPINIRole (REQUIP) tablet 1 mg, 1 mg, Oral, TID  atorvastatin (LIPITOR) tablet 20 mg, 20 mg, Oral, Nightly  [Held by provider] insulin glargine (LANTUS) injection vial 13 Units, 13 Units, SubCUTAneous, BID  loperamide (IMODIUM) capsule 2 mg, 2 mg, Oral, 4x Daily PRN  [Held by provider] LORazepam (ATIVAN) tablet 0.5 mg, 0.5 mg, Oral, Nightly  insulin lispro (HUMALOG) injection vial 0-4 Units, 0-4 Units, SubCUTAneous, TID WC  insulin lispro (HUMALOG) injection vial 0-4 Units, 0-4 Units, SubCUTAneous, Nightly  budesonide (PULMICORT) nebulizer suspension 500 mcg, 0.5 mg, Nebulization, BID  sodium chloride flush 0.9 % injection 10 mL, 10 mL, IntraVENous, 2 times per day  sodium chloride flush 0.9 % injection 10 mL, 10 mL, IntraVENous, PRN  0.9 % sodium chloride infusion, , IntraVENous, PRN  promethazine (PHENERGAN) tablet 12.5 mg, 12.5 mg, Oral, Q6H PRN **OR** ondansetron (ZOFRAN) injection 4 mg, 4 mg, IntraVENous, Q6H PRN  polyethylene glycol (GLYCOLAX) packet 17 g, 17 g, Oral, Daily PRN  acetaminophen (TYLENOL) tablet 650 mg, 650 mg, Oral, Q6H PRN **OR** acetaminophen (TYLENOL) suppository 650 mg, 650 mg, Rectal, Q6H PRN  miconazole (MICOTIN) 2 % powder, , Topical, BID  arformoterol tartrate (BROVANA) nebulizer solution 15 mcg, 15 mcg, Nebulization, BID  pantoprazole (PROTONIX) tablet 40 mg, 40 mg, Oral, QAM AC  glucagon (rDNA) injection 1 mg, 1 mg, SubCUTAneous, PRN  [Held by provider] apixaban (ELIQUIS) tablet 5 mg, 5 mg, Oral, BID  mirtazapine (REMERON) tablet 7.5 mg, 7.5 mg, Oral, Nightly  carbidopa-levodopa (SINEMET CR)  MG per extended release tablet 1 tablet, 1 tablet, Oral, TID  sertraline (ZOLOFT) tablet 50 mg, 50 mg, Oral, Daily  montelukast (SINGULAIR) tablet 10 mg, 10 mg, Oral, Nightly  Physical    VITALS:  BP (!) 104/48   Pulse 59   Temp 98 °F (36.7 °C)   Resp 16   Ht 5' (1.524 m)   Wt 215 lb (97.5 kg)   SpO2 100%   BMI 41.99 kg/m²   CONSTITUTIONAL:  awake, cooperative, and no apparent distress  EYES:  extra-ocular muscles intact  ENT:  normocepalic, without obvious abnormality  NECK:  supple, symmetrical, trachea midline  LUNGS:  no increased work of breathing, no retractions, and clear to auscultation  CARDIOVASCULAR:  normal apical pulses and normal S1 and S2  ABDOMEN:  normal bowel sounds, non-distended, and non-tender  MUSCULOSKELETAL:  there is no redness, warmth, or swelling of the joints  NEUROLOGIC:  Mental Status Exam:  Level of Alertness:   awake  Orientation:   person, place, time  SKIN:  no bruising or bleeding  Data    CBC:   Lab Results   Component Value Date/Time    WBC 7.3 02/20/2023 01:42 PM    RBC 3.01 02/20/2023 01:42 PM    HGB 7.6 02/20/2023 01:42 PM    HCT 26.6 02/20/2023 01:42 PM    MCV 88.4 02/20/2023 01:42 PM    MCH 25.2 02/20/2023 01:42 PM    MCHC 28.6 02/20/2023 01:42 PM    RDW 19.9 02/20/2023 01:42 PM     02/20/2023 01:42 PM    MPV 12.5 02/20/2023 01:42 PM     BMP:    Lab Results   Component Value Date/Time     02/20/2023 01:42 PM    K 3.6 02/20/2023 01:42 PM    CL 98 02/20/2023 01:42 PM    CO2 29 02/20/2023 01:42 PM    BUN 9 02/20/2023 01:42 PM    LABALBU 4.6 02/20/2023 01:42 PM    CREATININE 1.5 02/20/2023 01:42 PM    CALCIUM 8.4 02/20/2023 01:42 PM    GFRAA >60 10/16/2022 09:20 AM    LABGLOM 37 02/20/2023 01:42 PM    GLUCOSE 97 02/20/2023 01:42 PM       ASSESSMENT AND PLAN      Acute on chronic respiratory failure with hypoxia and hypercapnia: on nocturnal Bipap. Also supplemental oxygen. Continue to wean off  Palliative care encounter:  E. Faecalis UTI: completed Zyvox  Anemia due to CKD: hgb is stable  SHANTANU: now on dialysis. Will await for chair time  Sleep apnea: continue nocturnal Bipap  Pleural Effusion: continue dialysis. Pleurx cath inserted on 2/14. Management per pulm. Will drain 250 ml for now and check Chest X ray then will recommend additional drainage needs at discharge  Parkinson's disease: continue home med  DM type 2: accucheck ACHS.  Will continue to monitor  Disposition: possible snf soon

## 2023-02-21 NOTE — CARE COORDINATION
SS NOTE: COVID (-) 1/17- PT WILL NEED A RAPID NEGATIVE COVID TEST TO RETURN TO [facility]. O2 at 9 heated umang flow vs bipap FIO2 50%. . Palliative re-consulted. NEW HD this admit with tunneled catheter in place. Pleurx cath placed 2/14 with 1000cc removed. Pt is a long term care Resident at [facility]. [facility] pt to return there on up to 10L heated high flow O2, bipap FIO2 at 50% and they can provide transport for outpt HD and they can also accept and care for the pleurx cath. - however they will need specific orders. SW began referral to [address]- awaiting a chair time there. For the return to [facility] pt will need NO PRECERT, a SIGNED ADLLAS. Insurance has declined LTAC. SW to continue. ESTHER Flowers.2/21/2023.3:52PM.

## 2023-02-21 NOTE — PROGRESS NOTES
Physical Therapy  Physical Therapy Treatment Note/Plan of Care    Room #:  5179/9512-29  Patient Name: Shine Holland  YOB: 1950  MRN: [id number]    Date of Service: 2/21/2023     Tentative placement recommendation: Subacute Rehab  Equipment recommendation:  To be determined      Evaluating Physical Therapist: Risa Murguia PT, DPT #[id number]      Specific Provider Orders/Date/Referring Provider :     01/19/23 0745    PT eval and treat  Start:  01/19/23 0745,   End:  01/19/23 0745,   ONE TIME,   Standing Count:  1 Occurrences,   Charu Pham MD Acknowledge New     Admitting Diagnosis:   NSTEMI (non-ST elevated myocardial infarction) [facility]) [I21.4]  Atrial fibrillation with rapid ventricular response (HCC) [I48.91]  Recurrent right pleural effusion [J90]  Hypotension, unspecified hypotension type [I95.9]  Acute on chronic respiratory failure with hypoxia and hypercapnia (HCC) [J96.21, J96.22]      Surgery: none  Visit Diagnoses         Codes    Recurrent right pleural effusion     J90    NSTEMI (non-ST elevated myocardial infarction) ([facility]     I21.4    Postprocedural pneumothorax     J95.811            Patient Active Problem List   Diagnosis    Depression with anxiety    Osteoporosis    Asthma    Hyperlipidemia    Mitral regurgitation    Obstructive sleep apnea syndrome    Psoriasis    Diabetes mellitus ([facility]    Parkinson's disease ([facility]    Primary hypertension    Microalbuminuria    Morbid obesity (HCC)    RLS (restless legs syndrome)    Generalized weakness    Inability to walk    Hypothyroidism    Chest pain    Acute asthma exacerbation    Asthma exacerbation, mild    Paroxysmal atrial fibrillation (HCC)    Atrial fibrillation with rapid ventricular response (HCC)    Acute on chronic congestive heart failure ([address]    Coronary artery disease involving native coronary artery of native heart without angina pectoris    Dysphagia    Hepatosplenomegaly    Acute decompensated heart failure ([facility]    Acute diastolic (congestive) heart failure (HCC)    Nonrheumatic tricuspid valve regurgitation    Tobacco abuse    Recurrent syncope    Septic shock (HCC)    Seizure-like activity (HCC)    Acute kidney injury ([facility]    Pancytopenia (HCC)    Thrombocytopenia (HCC)    Encephalopathy    Acute respiratory failure with hypoxia (HCC)    Lactic acidosis    Delirium    Acute on chronic anemia    Pleural effusion, right    Acute respiratory failure with hypoxia and hypercapnia (HCC)    Acute confusion    Acute on chronic diastolic heart failure (HCC)    Macrocytosis    Other specified anemias    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[facility]    Puerperal sepsis with acute hypoxic respiratory failure without septic shock (HCC)    Pulmonary HTN (HCC)    Chronic anticoagulation    RVF (right ventricular failure) (HCC)    Metabolic alkalosis    Sepsis (HCC)    COPD exacerbation (HCC)    Acute on chronic respiratory failure with hypercapnia (HCC)    Persistent atrial fibrillation (HCC)    Hypercapnic respiratory failure (HCC)    Acute on chronic respiratory failure (HCC)    Hyperkalemia    Heart failure (HCC)    Acute renal insufficiency    Hypotension    Anemia    Acute on chronic respiratory failure with hypoxia and hypercapnia (HCC)    Palliative care encounter        ASSESSMENT of Current Deficits Patient exhibits decreased strength, balance, and endurance impairing functional mobility, transfers, gait , gait distance, and tolerance to activity. Patient able to perform all UE exercises with AROM; all LE exercises were AAROM/AROM with rest periods as needed. Pt did not want to get out of bed due to being to tired and on the Bipap.       PHYSICAL THERAPY  PLAN OF CARE       Physical therapy plan of care is established based on physician order,  patient diagnosis and clinical assessment    Current Treatment Recommendations:    -Bed Mobility: Lower extremity exercises  and Trunk control activities   -Sitting Balance: Incorporate reaching activities to activate trunk muscles , Hands on support to maintain midline , Facilitate active trunk muscle engagement , Facilitate postural control in all planes , and Engage in core activities to allow for movement within base of support   -Standing Balance: Perform strengthening exercises in standing to promote motor control with or without upper extremity support , Instruct patient on adequate base of support to maintain balance, and Challenge balance utilizing reaching  activities beyond center of gravity    -Transfers: Provide instruction on proper hand and foot position for adequate transfer of weight onto lower extremities and use of gait device if needed, Cues for hand placement, technique and safety. Provide stabilization to prevent fall , Facilitate weight shift forward on to lower extremities and provide necessary stabilization of bilateral lower extremities , Support transfer of weight on to lower extremities, and Assist with extension of knees trunk and hip to accept weight transfer   -Gait: Gait training, Standing activities to improve: base of support, weight shift, weight bearing , Exercises to improve trunk control, Exercises to improve hip and knee control, Performance of protected weight bearing activities, and Activities to increase weight bearing   -Endurance: Utilize Supervised activities to increase level of endurance to allow for safe functional mobility including transfers and gait  and Use graduated activities to promote good breathing techniques and provide support and education to maximize respiratory function    PT long term treatment goals are located in below grid    Patient and or family understand(s) diagnosis, prognosis, and plan of care. Frequency of treatments: Patient will be seen  3-5 times/week.          Prior Level of Function: Patient ambulated with wheeled walker for short distances  Rehab Potential: fair + for baseline    Past medical history:   Past Medical History:   Diagnosis Date    A-fib ([facility]     Acute on chronic congestive heart failure (HCC)     Anxiety     Asthma     CAD (coronary artery disease) 01/21/2016    Cancer ([facility]  breast ca 2006    right lumpectomy    Chronic kidney disease     nephrolithiasis    COPD exacerbation ([facility] 10/12/2022    Depression     Diabetes mellitus ([facility]     H/O mammogram     Hx MRSA infection     toe infection january 2012    Hyperlipidemia     Hypertension     Lateral epicondylitis     Morbid obesity (HCC)     SERENA on CPAP     Oxygen dependent     Parkinson's disease ([facility]     Tubal ligation status      Past Surgical History:   Procedure Laterality Date    BREAST LUMPECTOMY      BREAST REDUCTION SURGERY      CARDIAC CATHETERIZATION  4/28/2014    Dr. Federico Fernández  01/11/2022    Dr. Dina Reveles  7/29/15    CT GUIDED CHEST TUBE  7/8/2022    CT GUIDED CHEST TUBE 7/8/2022 Dary Farrar MD [facility]    ENDOSCOPY, COLON, DIAGNOSTIC  7/19/15    GALLBLADDER SURGERY      IR PERC CATH PLEURAL DRAIN W/IMAG  2/14/2023    IR PERC CATH PLEURAL DRAIN W/IMAG 2/14/2023 [facility]    LUMBAR LAMINECTOMY      TOE AMPUTATION      TONSILLECTOMY      UPPER GASTROINTESTINAL ENDOSCOPY      UPPER GASTROINTESTINAL ENDOSCOPY N/A 7/19/2022    EGD ESOPHAGOGASTRODUODENOSCOPY performed by Tiffanie Bates MD at [address]:    Precautions:  Up with assistance, falls, O2, and morbid obesity      Social history: Patient from SNF    Equipment owned: Wheelchair, Wheeled Walker, and [id number] - Inpatient   How much help is needed turning from your back to your side while in a flat bed without using bedrails?: A Lot  How much help is needed moving from lying on your back to sitting on the side of a flat bed without using bedrails?: A Lot  How much help is needed moving to and from a bed to a chair?: Total  How much help is needed standing up from a chair using your arms?: A Lot  How much help is needed walking in hospital room?: Total  How much help is needed climbing 3-5 steps with a railing?: Total  AM-PAC Inpatient Mobility Raw Score : 9  AM-[facility] Inpatient T-Scale Score : 30.55  Mobility Inpatient CMS 0-100% Score: 81.38  Mobility Inpatient CMS G-Code Modifier : CM    Nursing cleared patient for PT treatment. Patient c/o being tired. OBJECTIVE;   Initial Evaluation  Date: 1/19/2023 Treatment Date:  2/21/2023       Short Term/ Long Term   Goals   Was pt agreeable to Eval/treatment? Yes yes To be met in 3 days   Pain level   0/10   0/10    Bed Mobility    Rolling: Maximal assist of 1    Supine to sit: Maximal assist of 1    Sit to supine: Maximal assist of 1    Scooting: Maximal assist of 1   Rolling: Not assessed    Supine to sit: Not assessed    Sit to supine: Not assessed    Scooting: Not assessed     Rolling: Minimal assist of 1    Supine to sit: Minimal assist of 1    Sit to supine: Minimal assist of 1    Scooting: Minimal assist of 1     Transfers Sit to stand: Not assessed  d/t fair- seated balance and pt declining standing Sit to stand: Not assessed       Sit to stand:  Moderate assist of 1    Ambulation    not assessed  not assessed    > 15 feet using  wheeled walker with Moderate assist of 1    Stair negotiation: ascended and descended   Not assessed           ROM Within functional limits    Increase range of motion 10% of affected joints    Strength BUE:  refer to OT eval  RLE:  4-/5  LLE:  4-/5  Increase strength in affected mm groups by 1/3 grade   Balance Sitting EOB:  fair -  Dynamic Standing:  not assessed  Sitting EOB: not assessed   Dynamic Standing: not assessed    Sitting EOB:  fair   Dynamic Standing: fair - with [employer]     Patient is Alert & Oriented x person, place, time, and situation and follows directions    Sensation:  Patient  denies numbness/tingling   Edema:  no   Endurance: poor+    Vitals: Bipap   Blood Pressure at rest  Blood Pressure during session    Heart Rate at rest  Heart Rate during session    SPO2 at rest 94%  SPO2 during session 93 - 94%     Patient education  Patient educated on role of Physical Therapy, risks of immobility, safety and plan of care, energy conservation,  importance of mobility while in hospital , ankle pumps, quad set and glut set for edema control, blood clot prevention, safety , and positioning for skin integrity and comfort     Patient response to education:   Pt verbalized understanding Pt demonstrated skill Pt requires further education in this area   Yes Partial Yes      Treatment:  Patient practiced and was instructed/facilitated in the following treatment: Patient performed supine BLE & BUE exercises. Therapeutic Exercises:  ankle pumps, quad sets, heel slide, hip abduction/adduction, SAQ, shoulder elevation, elbow flexion/extension, supination/pronation, wrist flexion/extension, and finger flexion/extension, 15 - 20 reps      At end of session, patient in bed with     call light and phone within reach,  all lines and tubes intact, nursing notified. Patient would benefit from continued skilled Physical Therapy to improve functional independence and quality of life. Patient's/ family goals   rehab    Time in  15:00  Time out  15:23    Total Treatment Time  23 minutes      CPT codes:  Therapeutic exercises (23317)   23 minutes  2 unit(s)    Carrie Manjarrez  [facility]  LIC # [id number]

## 2023-02-22 ENCOUNTER — APPOINTMENT (OUTPATIENT)
Dept: GENERAL RADIOLOGY | Age: 73
End: 2023-02-22
Payer: MEDICARE

## 2023-02-22 LAB
METER GLUCOSE: 120 MG/DL (ref 74–99)
METER GLUCOSE: 134 MG/DL (ref 74–99)
METER GLUCOSE: 91 MG/DL (ref 74–99)

## 2023-02-22 PROCEDURE — 71045 X-RAY EXAM CHEST 1 VIEW: CPT

## 2023-02-22 PROCEDURE — 6370000000 HC RX 637 (ALT 250 FOR IP): Performed by: INTERNAL MEDICINE

## 2023-02-22 PROCEDURE — 6360000002 HC RX W HCPCS

## 2023-02-22 PROCEDURE — 99232 SBSQ HOSP IP/OBS MODERATE 35: CPT | Performed by: INTERNAL MEDICINE

## 2023-02-22 PROCEDURE — 6360000002 HC RX W HCPCS: Performed by: INTERNAL MEDICINE

## 2023-02-22 PROCEDURE — 94660 CPAP INITIATION&MGMT: CPT

## 2023-02-22 PROCEDURE — 2060000000 HC ICU INTERMEDIATE R&B

## 2023-02-22 PROCEDURE — 6370000000 HC RX 637 (ALT 250 FOR IP): Performed by: NURSE PRACTITIONER

## 2023-02-22 PROCEDURE — 82962 GLUCOSE BLOOD TEST: CPT

## 2023-02-22 PROCEDURE — 6360000002 HC RX W HCPCS: Performed by: NURSE PRACTITIONER

## 2023-02-22 PROCEDURE — 94640 AIRWAY INHALATION TREATMENT: CPT

## 2023-02-22 PROCEDURE — 2580000003 HC RX 258: Performed by: INTERNAL MEDICINE

## 2023-02-22 PROCEDURE — 2700000000 HC OXYGEN THERAPY PER DAY

## 2023-02-22 RX ADMIN — MIDODRINE HYDROCHLORIDE 10 MG: 5 TABLET ORAL at 06:49

## 2023-02-22 RX ADMIN — ANTI-FUNGAL POWDER MICONAZOLE NITRATE TALC FREE: 1.42 POWDER TOPICAL at 13:59

## 2023-02-22 RX ADMIN — IPRATROPIUM BROMIDE 0.5 MG: 0.5 SOLUTION RESPIRATORY (INHALATION) at 08:56

## 2023-02-22 RX ADMIN — SERTRALINE 50 MG: 50 TABLET, FILM COATED ORAL at 13:59

## 2023-02-22 RX ADMIN — ALBUTEROL SULFATE 2.5 MG: 2.5 SOLUTION RESPIRATORY (INHALATION) at 04:10

## 2023-02-22 RX ADMIN — CARBIDOPA AND LEVODOPA 1 TABLET: 50; 200 TABLET, EXTENDED RELEASE ORAL at 13:59

## 2023-02-22 RX ADMIN — ALBUTEROL SULFATE 2.5 MG: 2.5 SOLUTION RESPIRATORY (INHALATION) at 08:56

## 2023-02-22 RX ADMIN — HEPARIN SODIUM 5000 UNITS: 5000 INJECTION INTRAVENOUS; SUBCUTANEOUS at 14:00

## 2023-02-22 RX ADMIN — METOPROLOL SUCCINATE 25 MG: 25 TABLET, FILM COATED, EXTENDED RELEASE ORAL at 14:00

## 2023-02-22 RX ADMIN — IPRATROPIUM BROMIDE 0.5 MG: 0.5 SOLUTION RESPIRATORY (INHALATION) at 18:08

## 2023-02-22 RX ADMIN — ALBUTEROL SULFATE 2.5 MG: 2.5 SOLUTION RESPIRATORY (INHALATION) at 14:07

## 2023-02-22 RX ADMIN — MIDODRINE HYDROCHLORIDE 10 MG: 5 TABLET ORAL at 14:00

## 2023-02-22 RX ADMIN — ARFORMOTEROL TARTRATE 15 MCG: 15 SOLUTION RESPIRATORY (INHALATION) at 04:10

## 2023-02-22 RX ADMIN — IPRATROPIUM BROMIDE 0.5 MG: 0.5 SOLUTION RESPIRATORY (INHALATION) at 14:08

## 2023-02-22 RX ADMIN — BUDESONIDE 500 MCG: 0.5 SUSPENSION RESPIRATORY (INHALATION) at 18:08

## 2023-02-22 RX ADMIN — SUCRALFATE 1 G: 1 TABLET ORAL at 17:53

## 2023-02-22 RX ADMIN — SUCRALFATE 1 G: 1 TABLET ORAL at 13:59

## 2023-02-22 RX ADMIN — CARBIDOPA AND LEVODOPA 1 TABLET: 50; 200 TABLET, EXTENDED RELEASE ORAL at 20:28

## 2023-02-22 RX ADMIN — ROPINIROLE HYDROCHLORIDE 1 MG: 1 TABLET, FILM COATED ORAL at 14:00

## 2023-02-22 RX ADMIN — Medication 10 ML: at 14:00

## 2023-02-22 RX ADMIN — MONTELUKAST SODIUM 10 MG: 10 TABLET, FILM COATED ORAL at 20:27

## 2023-02-22 RX ADMIN — ANTI-FUNGAL POWDER MICONAZOLE NITRATE TALC FREE: 1.42 POWDER TOPICAL at 20:28

## 2023-02-22 RX ADMIN — MIRTAZAPINE 7.5 MG: 15 TABLET, FILM COATED ORAL at 20:27

## 2023-02-22 RX ADMIN — ROPINIROLE HYDROCHLORIDE 1 MG: 1 TABLET, FILM COATED ORAL at 20:27

## 2023-02-22 RX ADMIN — HEPARIN SODIUM 5000 UNITS: 5000 INJECTION INTRAVENOUS; SUBCUTANEOUS at 20:28

## 2023-02-22 RX ADMIN — SUCRALFATE 1 G: 1 TABLET ORAL at 20:27

## 2023-02-22 RX ADMIN — ALBUTEROL SULFATE 2.5 MG: 2.5 SOLUTION RESPIRATORY (INHALATION) at 18:08

## 2023-02-22 RX ADMIN — ARFORMOTEROL TARTRATE 15 MCG: 15 SOLUTION RESPIRATORY (INHALATION) at 18:08

## 2023-02-22 RX ADMIN — ATORVASTATIN CALCIUM 20 MG: 20 TABLET, FILM COATED ORAL at 20:27

## 2023-02-22 RX ADMIN — IPRATROPIUM BROMIDE 0.5 MG: 0.5 SOLUTION RESPIRATORY (INHALATION) at 04:10

## 2023-02-22 RX ADMIN — EPOETIN ALFA-EPBX 3000 UNITS: 3000 INJECTION, SOLUTION INTRAVENOUS; SUBCUTANEOUS at 14:00

## 2023-02-22 RX ADMIN — ACETAMINOPHEN 650 MG: 325 TABLET ORAL at 10:00

## 2023-02-22 RX ADMIN — MIDODRINE HYDROCHLORIDE 10 MG: 5 TABLET ORAL at 17:53

## 2023-02-22 RX ADMIN — Medication 10 ML: at 20:37

## 2023-02-22 RX ADMIN — MAGNESIUM OXIDE 400 MG: 400 TABLET ORAL at 13:59

## 2023-02-22 RX ADMIN — BUDESONIDE 500 MCG: 0.5 SUSPENSION RESPIRATORY (INHALATION) at 04:10

## 2023-02-22 RX ADMIN — HEPARIN SODIUM 5000 UNITS: 5000 INJECTION INTRAVENOUS; SUBCUTANEOUS at 06:49

## 2023-02-22 RX ADMIN — PANTOPRAZOLE SODIUM 40 MG: 40 TABLET, DELAYED RELEASE ORAL at 06:49

## 2023-02-22 RX ADMIN — ONDANSETRON 4 MG: 2 INJECTION INTRAMUSCULAR; INTRAVENOUS at 20:28

## 2023-02-22 ASSESSMENT — PAIN SCALES - GENERAL
PAINLEVEL_OUTOF10: 0
PAINLEVEL_OUTOF10: 8

## 2023-02-22 ASSESSMENT — PAIN DESCRIPTION - LOCATION: LOCATION: GENERALIZED

## 2023-02-22 NOTE — FLOWSHEET NOTE
02/22/23 1308   Observations & Evaluations   Level of Consciousness 0   Oriented X 3   Heart Rhythm Regular   Respiratory Quality/Effort Unlabored   O2 Device High flow nasal cannula   Bilateral Breath Sounds Diminished   Skin Condition/Temp Dry   Vital Signs   BP (!) 107/49   Temp 97.9 °F (36.6 °C)   Resp 16   Weight 221 lb 1.9 oz (100.3 kg)   Percent Weight Change 2.85   Post-Hemodialysis Assessment   Post-Treatment Procedures Catheter capped, clamped and heparinized x 2 ports   Machine Disinfection Process Acid/Vinegar Clean;Heat Disinfect   Rinseback Volume (ml) 200 ml   Blood Volume Processed (Liters) 67 l/min   Dialyzer Clearance Lightly streaked   Duration of Treatment (minutes) 210 minutes   Heparin Amount Administered During Treatment (mL) 0 mL   Hemodialysis Intake (ml) 485 ml   Hemodialysis Output (ml) 685 ml   NET Removed (ml) 200   Tolerated Treatment Good   Interventions Taken Medication   Patient Response to Treatment fair   Physician Notified Yes

## 2023-02-22 NOTE — CARE COORDINATION
SS NOTE: COVID (-) 1/17- PT WILL NEED A RAPID NEGATIVE COVID TEST TO RETURN TO [facility]. Pt is on 1 liter of O2 and the bipap at night. Pt has a tunneled catheter. Pleurex cath placed 2/14 with 1000cc removed- continue to await further orders and whether pt will leave to return to the  with the pleurex. Pt is a long term care Resident at [facility]. [facility] will accept pt to return there. They will provide transport for outpt HD and they can also accept and care for the pleurx cath. - however they will need specific orders. [facility] shared pt's chair schedule today- M W F at 5:45AM. For the return to [facility] pt will need NO PRECERT, a SIGNED [place]. KRISHNA advised both pt and her dtr Saurav Posada of this information today. Lilliana shared with this SW that she is calling in family since she was given a very poor prognosis last week by the Hospitalist.  SW to continue. ESTHER Trejo.2/22/2023.12:11AM.

## 2023-02-22 NOTE — PROGRESS NOTES
Department of Internal Medicine  General Internal Medicine  Attending Progress Note  Chief Complaint   Patient presents with    Respiratory Distress     Normally on 3L NC, came in on CPAP, given total of 50mcg push dose epi for low BP by EMS     SUBJECTIVE:    Reports that she is doing better. Denied fever, nausea or vomiting. No chest pain. Patient expressed interest in being discharged back to SNF.     OBJECTIVE      Medications    Current Facility-Administered Medications: epoetin josé luis-epbx (RETACRIT) injection 3,000 Units, 3,000 Units, SubCUTAneous, Once per day on Mon Wed Fri  albuterol (PROVENTIL) nebulizer solution 2.5 mg, 2.5 mg, Nebulization, 4x daily **AND** ipratropium (ATROVENT) 0.02 % nebulizer solution 0.5 mg, 0.5 mg, Nebulization, 4x daily  glucose chewable tablet 16 g, 4 tablet, Oral, PRN  dextrose bolus 10% 125 mL, 125 mL, IntraVENous, PRN **OR** dextrose bolus 10% 250 mL, 250 mL, IntraVENous, PRN  glucagon (rDNA) injection 1 mg, 1 mg, SubCUTAneous, PRN  dextrose 10 % infusion, , IntraVENous, Continuous PRN  magnesium oxide (MAG-OX) tablet 400 mg, 400 mg, Oral, Daily  midodrine (PROAMATINE) tablet 10 mg, 10 mg, Oral, TID WC  heparin (porcine) injection 5,000 Units, 5,000 Units, SubCUTAneous, 3 times per day  metoprolol succinate (TOPROL XL) extended release tablet 25 mg, 25 mg, Oral, Daily  [Held by provider] digoxin (LANOXIN) injection 125 mcg, 125 mcg, IntraVENous, Daily  sodium chloride (OCEAN, BABY AYR) 0.65 % nasal spray 1 spray, 1 spray, Each Nostril, PRN  sucralfate (CARAFATE) tablet 1 g, 1 g, Oral, 4x Daily  rOPINIRole (REQUIP) tablet 1 mg, 1 mg, Oral, TID  atorvastatin (LIPITOR) tablet 20 mg, 20 mg, Oral, Nightly  [Held by provider] insulin glargine (LANTUS) injection vial 13 Units, 13 Units, SubCUTAneous, BID  loperamide (IMODIUM) capsule 2 mg, 2 mg, Oral, 4x Daily PRN  [Held by provider] LORazepam (ATIVAN) tablet 0.5 mg, 0.5 mg, Oral, Nightly  insulin lispro (HUMALOG) injection vial 0-4 Units, 0-4 Units, SubCUTAneous, TID WC  insulin lispro (HUMALOG) injection vial 0-4 Units, 0-4 Units, SubCUTAneous, Nightly  budesonide (PULMICORT) nebulizer suspension 500 mcg, 0.5 mg, Nebulization, BID  sodium chloride flush 0.9 % injection 10 mL, 10 mL, IntraVENous, 2 times per day  sodium chloride flush 0.9 % injection 10 mL, 10 mL, IntraVENous, PRN  0.9 % sodium chloride infusion, , IntraVENous, PRN  promethazine (PHENERGAN) tablet 12.5 mg, 12.5 mg, Oral, Q6H PRN **OR** ondansetron (ZOFRAN) injection 4 mg, 4 mg, IntraVENous, Q6H PRN  polyethylene glycol (GLYCOLAX) packet 17 g, 17 g, Oral, Daily PRN  acetaminophen (TYLENOL) tablet 650 mg, 650 mg, Oral, Q6H PRN **OR** acetaminophen (TYLENOL) suppository 650 mg, 650 mg, Rectal, Q6H PRN  miconazole (MICOTIN) 2 % powder, , Topical, BID  arformoterol tartrate (BROVANA) nebulizer solution 15 mcg, 15 mcg, Nebulization, BID  pantoprazole (PROTONIX) tablet 40 mg, 40 mg, Oral, QAM AC  glucagon (rDNA) injection 1 mg, 1 mg, SubCUTAneous, PRN  [Held by provider] apixaban (ELIQUIS) tablet 5 mg, 5 mg, Oral, BID  mirtazapine (REMERON) tablet 7.5 mg, 7.5 mg, Oral, Nightly  carbidopa-levodopa (SINEMET CR)  MG per extended release tablet 1 tablet, 1 tablet, Oral, TID  sertraline (ZOLOFT) tablet 50 mg, 50 mg, Oral, Daily  montelukast (SINGULAIR) tablet 10 mg, 10 mg, Oral, Nightly  Physical    VITALS:  BP (!) 117/55   Pulse 63   Temp 97.8 °F (36.6 °C) (Oral)   Resp 16   Ht 5' (1.524 m)   Wt 221 lb 1.9 oz (100.3 kg)   SpO2 100%   BMI 43.18 kg/m²   CONSTITUTIONAL:  awake, cooperative, and no apparent distress  EYES:  extra-ocular muscles intact  ENT:  normocepalic, without obvious abnormality  LUNGS:  no increased work of breathing, no retractions, and clear to auscultation  CARDIOVASCULAR:  normal apical pulses and normal S1 and S2  ABDOMEN:  normal bowel sounds, non-distended, and non-tender  MUSCULOSKELETAL:  there is no redness, warmth, or swelling of the joints  NEUROLOGIC:  Mental Status Exam:  Level of Alertness:   awake  Orientation:   person, place, time  SKIN:  no bruising or bleeding  Data    CBC:   Lab Results   Component Value Date/Time    WBC 7.3 02/20/2023 01:42 PM    RBC 3.01 02/20/2023 01:42 PM    HGB 7.6 02/20/2023 01:42 PM    HCT 26.6 02/20/2023 01:42 PM    MCV 88.4 02/20/2023 01:42 PM    MCH 25.2 02/20/2023 01:42 PM    MCHC 28.6 02/20/2023 01:42 PM    RDW 19.9 02/20/2023 01:42 PM     02/20/2023 01:42 PM    MPV 12.5 02/20/2023 01:42 PM     BMP:    Lab Results   Component Value Date/Time     02/20/2023 01:42 PM    K 3.6 02/20/2023 01:42 PM    CL 98 02/20/2023 01:42 PM    CO2 29 02/20/2023 01:42 PM    BUN 9 02/20/2023 01:42 PM    LABALBU 4.6 02/20/2023 01:42 PM    CREATININE 1.5 02/20/2023 01:42 PM    CALCIUM 8.4 02/20/2023 01:42 PM    GFRAA >60 10/16/2022 09:20 AM    LABGLOM 37 02/20/2023 01:42 PM    GLUCOSE 97 02/20/2023 01:42 PM       ASSESSMENT AND PLAN      Acute on chronic respiratory failure with hypoxia and hypercapnia:  Palliative care encounter  Hypotension  DM type 2 complicated with nephropathy  Anemia due to CKD  Hyperlipidemia  Congestive heart failure with diastolic dysfunction. Uti due to E. Faecalis: completed abx. Plans:  Pleurx Catheter was inserted on 2/14. Has not removed any fluid since insertion. Will get a chest X ray if pleural effusion is still tangible, will remove 250 ml and will determine how often to drain at discharge  BGL is well controlled. Continue supplemental oxygen and bipap at night.    Discharge in the next 24 hours

## 2023-02-22 NOTE — CARE COORDINATION
SS NOTE: COVID (-) 1/17- PT WILL NEED A RAPID NEGATIVE COVID TEST TO RETURN TO [facility] [place] [facility]. Pt is on 1 liter of O2 and the bipap at night. Pt has a tunneled catheter. Pleurx cath placed 2/14 with 1000cc removed- continue to await further orders and whether pt will leave to return to the  with the pleurex. Pt is a long term care Resident at [facility]. [facility] will accept pt to return there. They will provide transport for outpt HD and they can also accept and care for the pleurx cath. - however they will need specific orders. [facility] shared pt's chair schedule today- M W F at 5:45AM. For the return to [facility] pt will need NO PRECERT, a SIGNED [place]. ISW to continue. ESTHER Pagan.2/22/2023.11:27AM.

## 2023-02-22 NOTE — PROGRESS NOTES
[facility] MD King Tima Ray, MD Jimmie Chaparro, MD Kd Schultz, CNP   Joyce Devlin, ANP  Janie Stock, CNP  Progress Note    2/22/2023    SUBJECTIVE:   1/20: Feeling little better today. Denies new complaint. Still tachypnea, though denies dyspnea no cp/palp Appetite ok ROS otherwise unremarkable    1//21 seen in her room on o2/nc bp stable weak poor po intake   2+ edema in LE     1/22 charts reviewed . On bipap    1/23 : on o2/nc . Still look hypervolemic     1/24 seen in her room comfortable o2/nc . Still with LE edema which seems to be multifactorial and will likely need to accept having some edema on board     1/25 sitting on edge of the bed working with pt . Still with considerable edema in LEs   BP stable     1/26 seen in her room reports of SOB with minimal activity , LE edema still signficant . 1/27 good UO On 5 L/o2/nc  Still with sob with activity Tachycardiac with low functional capacity     1/28: Asleep, resting and breathing comfortably on nasal cannula. Remains edematous. Ongoing fatigue and generalized weakness. 1/29: Hypotensive last evening, Bumex drip stopped, IV normal saline bolus administered to effect. Breathing little more easily though still on AVAPS. To be downgraded to CPAP shortly. Thirsty. Still markedly edematous. 1/30: Somnolent, though arousable. On CPAP. Ongoing fatigue and malaise with generalized weakness    1/31 seen in her room , skin wrinkling in LE on HFNC . BP on lower side  For thoracentesis today   Dw RN .     2/1: Seen while laying in bed, no acute distress. Tells me that she is thirsty. She is on heated high flow nasal canula. Feels that her breathing has improved slightly. Plan is for thoracentesis later this afternoon, could not be done yesterday due to elevated INR. Still with skin wrinkling to bilateral lower extremities.      2/2 1 L removed with thoracentesis   O2 requirement better on 8 L o2/nc Very weak and deconditioned   Labile BP numbers    2/2 o2/nc LE UO ok remain weak decondtioned . Edema in LE getting somewhat worse    2/4 remain with significant depdent edema deconditioned in bed     2/5: Discussed with respiratory therapist: Just put on her CPAP after having tolerated nasal cannula throughout most of the day. Remains bedbound, severely weak and debilitated. Remains massively swollen, little change in the last 5 days. 2/6: Status quo. Somnolent but arousable. Desats and dyspneic without AVAPS. Remains massively edematous    2/7:  resp status is status quo. More awake, alert, interactive today. Resumed bumex gtt yesterday. 2/8 for dialysis line in am   Alert oriented   Remain markedly hypervolemic     2/9: Sitting up in bed, on Bipap. Family is present at bedside. Markedly edematous. She is asking me if [facility] placement will hurt. She reports dyspnea, but feels better on Bipap. She denies chest pain or palpitations. Plan is for [facility] catheter placement with IR today. 2/10: Seen while on HD. Tolerating without complications. Tells me she is feeling much better. Still edematous, but edema has improved substantially. She is on nasal canula, eating breakfast. She had 2.7 L removed yesterday and 3L removed today. 2/11: Dialysis today notable for hypotension and only 2.1 L UF. Currently asleep, denies new complaint. Ongoing fatigue, malaise, generalized weakness. 2/12: Somewhat less anxious than she had been over the past several days. Did tolerate roughly 3 hours off of CPAP consecutively yesterday. Still mostly using CPAP throughout the day. Swelling better. 2/13: Seen while in dialysis. Tolerating ultrafiltration without complications. Plan is to remove 3L today. Blood pressure is stable. She is on nasal canula. She tells me that she goes back and forth between nasal canula and CPAP. Plan is for a right sided pleurix catheter to be placed later today.      2/14: Seen while in dialysis. Tolerating treatment without complication. Blood pressure is stable. She is on nasal canula. Tells me she continues to feel better each day. Still with significant abdominal swelling. 2/15:  Laying in bed on BiPap. She has mild shortness of breath. BP remains low. She denies chest pain. Appetite is fair. 2/16: Seen while on hemodialysis. Tolerating treatment without complication. Blood pressure has improved and is stable. She denies dizziness, chest pain, dyspnea, or palpitations. Tells me she is tolerating being off the BIPAP for longer periods. Appetite is good. 2/17 swelling improved   Clinically better  O2 requirement better  Remain weak and deconditioned and basically bedbound     2/18 seen in her HD today with no reported issues o2 requirment continue to improve edema better   Need to work with pt/ot . She is basically bedbound     2/20: Seen while laying in bed. Tolerated HD earlier today. 1 liter of fluid removed. Her edema has improved substantially. She tells me she is feeling much better. She is on nasal canula. She tells me she is still making urine. 2/22: Seen while on dialysis. Tolerating treatment without complications. She is now down to 1 L of oxygen via nasal canula. She tells me that she is feeling much better. Does complain of slight pain at pleurix catheter site. Swelling has improved substantially. She reports that she is not urinating. PROBLEM LIST:    Principal Problem:    Acute on chronic respiratory failure with hypoxia and hypercapnia (HCC)  Active Problems:    Palliative care encounter  Resolved Problems:    * No resolved hospital problems. *         DIET:    ADULT DIET; Regular; 3 carb choices (45 gm/meal); Low Fat/Low Chol/High Fiber/2 gm Na; Moderately Thick (Honey); 1200 ml  ADULT ORAL NUTRITION SUPPLEMENT; Lunch, Dinner;  Other Oral Supplement; Gelatein 20     MEDS (scheduled):    epoetin josé luis-epbx  3,000 Units SubCUTAneous Once per day on Mon Wed Fri    albuterol  2.5 mg Nebulization 4x daily    And    ipratropium  0.5 mg Nebulization 4x daily    magnesium oxide  400 mg Oral Daily    midodrine  10 mg Oral TID     heparin (porcine)  5,000 Units SubCUTAneous 3 times per day    metoprolol succinate  25 mg Oral Daily    [Held by provider] digoxin  125 mcg IntraVENous Daily    sucralfate  1 g Oral 4x Daily    rOPINIRole  1 mg Oral TID    atorvastatin  20 mg Oral Nightly    [Held by provider] insulin glargine  13 Units SubCUTAneous BID    [Held by provider] LORazepam  0.5 mg Oral Nightly    insulin lispro  0-4 Units SubCUTAneous TID     insulin lispro  0-4 Units SubCUTAneous Nightly    budesonide  0.5 mg Nebulization BID    sodium chloride flush  10 mL IntraVENous 2 times per day    miconazole   Topical BID    arformoterol tartrate  15 mcg Nebulization BID    pantoprazole  40 mg Oral QAM AC    [Held by provider] apixaban  5 mg Oral BID    mirtazapine  7.5 mg Oral Nightly    carbidopa-levodopa  1 tablet Oral TID    sertraline  50 mg Oral Daily    montelukast  10 mg Oral Nightly       MEDS (infusions):   dextrose      sodium chloride 25 mL/hr at 02/04/23 0222       MEDS (prn):  glucose, dextrose bolus **OR** dextrose bolus, glucagon (rDNA), dextrose, sodium chloride, loperamide, sodium chloride flush, sodium chloride, promethazine **OR** ondansetron, polyethylene glycol, acetaminophen **OR** acetaminophen, glucagon (rDNA)    PHYSICAL EXAM:     Patient Vitals for the past 24 hrs:   BP Temp Temp src Pulse Resp SpO2   02/22/23 0959 (!) 97/42 -- -- 53 -- --   02/22/23 0930 (!) 135/47 97.7 °F (36.5 °C) -- 65 -- --   02/22/23 0645 (!) 95/53 98 °F (36.7 °C) Oral 50 20 99 %   02/21/23 2251 -- -- -- -- 22 --   02/21/23 2030 (!) 132/49 98 °F (36.7 °C) Oral 55 18 93 %   02/21/23 1458 -- -- -- -- -- 94 %   02/21/23 1457 (!) 101/54 97.7 °F (36.5 °C) Oral 55 15 93 %     @      Intake/Output Summary (Last 24 hours) at 2/22/2023 1012  Last data filed at 2/21/2023 1415  Gross per 24 hour   Intake 240 ml   Output --   Net 240 ml           Wt Readings from Last 3 Encounters:   02/21/23 215 lb (97.5 kg)   01/16/23 259 lb (117.5 kg)   01/16/23 259 lb (117.5 kg)     Age-appropriate morbidly obese white woman, though easily arousable, no apparent distress  NC/AT EOMI sclera conjunctive are clear and anicteric mucous membranes are moist  Neck soft supple trachea midline  Lungs:  Diminished bilaterally. Poor inspiratory effort. Right pleurix catheter   Regular rhythm normal S1 and S2  Abdomen soft nontender nondistended normal active bowel sounds. Abdominal pannus has edema (+1 pitting), improving  Distal extremities, thighs, sacrum have substantial chronic type nonpitting edema, trace BLE edema. Pulses 1+ x4  Moves all 4 extremities  Cranial nerves II through XII grossly intact  Awake, alert, interactive and appropriate  Skin tone consistent with chronic venous stasis distally      DATA:    Recent Labs     02/20/23  1342   WBC 7.3   HGB 7.6*   HCT 26.6*   MCV 88.4   *           Recent Labs     02/20/23  1342      K 3.6   CL 98   CO2 29   BUN 9   CREATININE 1.5*   ALT <5   AST 7   BILITOT 0.7   ALKPHOS 53         Lab Results   Component Value Date    LABPROT 0.5 (H) 01/18/2023    LABPROT 0.5 01/18/2023     On CT no hydro/signs of obstruction     Urine studies  U Na 21, U Cl 23 U prot:cr ratio 0.5    ASSESSMENT / RECOMMENDATIONS:       Assessment  1. Acute kidney injury urine lytes support decrease effective volume     2. CKD stage III, Baseline creatinine 1.4 to 1.7 mg/dL, secondary to diabetic nephropathy and renal microvascular atherosclerotic disease. 0.5 g proteinuria     3. Anemia due to CKD, phlebotomy associated prolonged hospitalization     4. Acute hypercapnic and hypoxic respiratory failure due to COPD exacerbation and pneumonia. Does not appear hypervolemic. 5.  Morbid obesity, BMI 50.6 kg per metered square     6.   Edema/anasarca, chronic, multifactorial, due to venous insufficiency in the setting of morbid obesity, relative immobility, likely diastolic dysfunction though it was \"indeterminate\" on echo, the does have pulmonary hypertension, mild right-sided heart failure    7. Hypotension    8.   Elevated bicarbonate represents compensation for chronic respiratory acidosis, as well as likely contraction alkalosis due to diuresis on the Bumex drip       Recommendations  -BMP today   -continue Renal replacement therapy support for UF and solute purposes  -continue  HD schedule MWF   -Start Retacrit   -Continue Midodrine 10 mg TID   -PT/OT   -SW for HD unit placement-referral made to [facility] dialysis center       Electronically signed by RASHARD Lewis - BRADLEY on 2/22/2023

## 2023-02-23 ENCOUNTER — APPOINTMENT (OUTPATIENT)
Dept: INTERVENTIONAL RADIOLOGY/VASCULAR | Age: 73
End: 2023-02-23
Payer: MEDICARE

## 2023-02-23 ENCOUNTER — APPOINTMENT (OUTPATIENT)
Dept: GENERAL RADIOLOGY | Age: 73
End: 2023-02-23
Payer: MEDICARE

## 2023-02-23 LAB
ANION GAP SERPL CALCULATED.3IONS-SCNC: 12 MMOL/L (ref 7–16)
ANION GAP SERPL CALCULATED.3IONS-SCNC: 15 MMOL/L (ref 7–16)
BUN BLDV-MCNC: 13 MG/DL (ref 6–23)
BUN BLDV-MCNC: 15 MG/DL (ref 6–23)
CALCIUM SERPL-MCNC: 8.4 MG/DL (ref 8.6–10.2)
CALCIUM SERPL-MCNC: 8.6 MG/DL (ref 8.6–10.2)
CHLORIDE BLD-SCNC: 96 MMOL/L (ref 98–107)
CHLORIDE BLD-SCNC: 96 MMOL/L (ref 98–107)
CO2: 23 MMOL/L (ref 22–29)
CO2: 27 MMOL/L (ref 22–29)
CREAT SERPL-MCNC: 2.3 MG/DL (ref 0.5–1)
CREAT SERPL-MCNC: 2.5 MG/DL (ref 0.5–1)
GFR SERPL CREATININE-BSD FRML MDRD: 20 ML/MIN/1.73
GFR SERPL CREATININE-BSD FRML MDRD: 22 ML/MIN/1.73
GLUCOSE BLD-MCNC: 139 MG/DL (ref 74–99)
GLUCOSE BLD-MCNC: 156 MG/DL (ref 74–99)
HAV IGM SER IA-ACNC: NORMAL
HCT VFR BLD CALC: 32.2 % (ref 34–48)
HEMOGLOBIN: 8.1 G/DL (ref 11.5–15.5)
HEPATITIS B CORE IGM ANTIBODY: NORMAL
HEPATITIS B SURFACE ANTIGEN INTERPRETATION: NORMAL
MCH RBC QN AUTO: 25.4 PG (ref 26–35)
MCHC RBC AUTO-ENTMCNC: 25.2 % (ref 32–34.5)
MCV RBC AUTO: 100.9 FL (ref 80–99.9)
METER GLUCOSE: 117 MG/DL (ref 74–99)
METER GLUCOSE: 139 MG/DL (ref 74–99)
METER GLUCOSE: 151 MG/DL (ref 74–99)
METER GLUCOSE: 151 MG/DL (ref 74–99)
PDW BLD-RTO: 20.1 FL (ref 11.5–15)
PLATELET # BLD: 73 E9/L (ref 130–450)
PLATELET CONFIRMATION: NORMAL
PMV BLD AUTO: 13.3 FL (ref 7–12)
POTASSIUM SERPL-SCNC: 3.8 MMOL/L (ref 3.5–5)
POTASSIUM SERPL-SCNC: 4.3 MMOL/L (ref 3.5–5)
RBC # BLD: 3.19 E12/L (ref 3.5–5.5)
SODIUM BLD-SCNC: 134 MMOL/L (ref 132–146)
SODIUM BLD-SCNC: 135 MMOL/L (ref 132–146)
WBC # BLD: 9.3 E9/L (ref 4.5–11.5)

## 2023-02-23 PROCEDURE — 6370000000 HC RX 637 (ALT 250 FOR IP): Performed by: NURSE PRACTITIONER

## 2023-02-23 PROCEDURE — 97110 THERAPEUTIC EXERCISES: CPT | Performed by: PHYSICAL THERAPIST

## 2023-02-23 PROCEDURE — 6370000000 HC RX 637 (ALT 250 FOR IP): Performed by: INTERNAL MEDICINE

## 2023-02-23 PROCEDURE — 6360000002 HC RX W HCPCS: Performed by: INTERNAL MEDICINE

## 2023-02-23 PROCEDURE — 6360000002 HC RX W HCPCS: Performed by: NURSE PRACTITIONER

## 2023-02-23 PROCEDURE — 97530 THERAPEUTIC ACTIVITIES: CPT | Performed by: PHYSICAL THERAPIST

## 2023-02-23 PROCEDURE — 2700000000 HC OXYGEN THERAPY PER DAY

## 2023-02-23 PROCEDURE — 99232 SBSQ HOSP IP/OBS MODERATE 35: CPT | Performed by: INTERNAL MEDICINE

## 2023-02-23 PROCEDURE — 2060000000 HC ICU INTERMEDIATE R&B

## 2023-02-23 PROCEDURE — 2580000003 HC RX 258: Performed by: INTERNAL MEDICINE

## 2023-02-23 PROCEDURE — 82962 GLUCOSE BLOOD TEST: CPT

## 2023-02-23 PROCEDURE — 94660 CPAP INITIATION&MGMT: CPT

## 2023-02-23 PROCEDURE — 94640 AIRWAY INHALATION TREATMENT: CPT

## 2023-02-23 PROCEDURE — 80048 BASIC METABOLIC PNL TOTAL CA: CPT

## 2023-02-23 PROCEDURE — 85027 COMPLETE CBC AUTOMATED: CPT

## 2023-02-23 PROCEDURE — 32552 REMOVE LUNG CATHETER: CPT

## 2023-02-23 PROCEDURE — 36415 COLL VENOUS BLD VENIPUNCTURE: CPT

## 2023-02-23 PROCEDURE — 71045 X-RAY EXAM CHEST 1 VIEW: CPT

## 2023-02-23 RX ADMIN — ROPINIROLE HYDROCHLORIDE 1 MG: 1 TABLET, FILM COATED ORAL at 09:25

## 2023-02-23 RX ADMIN — MIDODRINE HYDROCHLORIDE 10 MG: 5 TABLET ORAL at 18:42

## 2023-02-23 RX ADMIN — ONDANSETRON 4 MG: 2 INJECTION INTRAMUSCULAR; INTRAVENOUS at 11:11

## 2023-02-23 RX ADMIN — ALBUTEROL SULFATE 2.5 MG: 2.5 SOLUTION RESPIRATORY (INHALATION) at 10:08

## 2023-02-23 RX ADMIN — MIDODRINE HYDROCHLORIDE 10 MG: 5 TABLET ORAL at 04:14

## 2023-02-23 RX ADMIN — Medication 10 ML: at 09:26

## 2023-02-23 RX ADMIN — PANTOPRAZOLE SODIUM 40 MG: 40 TABLET, DELAYED RELEASE ORAL at 05:43

## 2023-02-23 RX ADMIN — IPRATROPIUM BROMIDE 0.5 MG: 0.5 SOLUTION RESPIRATORY (INHALATION) at 10:08

## 2023-02-23 RX ADMIN — BUDESONIDE 500 MCG: 0.5 SUSPENSION RESPIRATORY (INHALATION) at 06:29

## 2023-02-23 RX ADMIN — CARBIDOPA AND LEVODOPA 1 TABLET: 50; 200 TABLET, EXTENDED RELEASE ORAL at 21:30

## 2023-02-23 RX ADMIN — SUCRALFATE 1 G: 1 TABLET ORAL at 18:42

## 2023-02-23 RX ADMIN — BUDESONIDE 500 MCG: 0.5 SUSPENSION RESPIRATORY (INHALATION) at 18:02

## 2023-02-23 RX ADMIN — IPRATROPIUM BROMIDE 0.5 MG: 0.5 SOLUTION RESPIRATORY (INHALATION) at 14:15

## 2023-02-23 RX ADMIN — MAGNESIUM OXIDE 400 MG: 400 TABLET ORAL at 09:25

## 2023-02-23 RX ADMIN — ANTI-FUNGAL POWDER MICONAZOLE NITRATE TALC FREE: 1.42 POWDER TOPICAL at 09:26

## 2023-02-23 RX ADMIN — ALBUTEROL SULFATE 2.5 MG: 2.5 SOLUTION RESPIRATORY (INHALATION) at 18:01

## 2023-02-23 RX ADMIN — SUCRALFATE 1 G: 1 TABLET ORAL at 21:25

## 2023-02-23 RX ADMIN — HEPARIN SODIUM 5000 UNITS: 5000 INJECTION INTRAVENOUS; SUBCUTANEOUS at 05:43

## 2023-02-23 RX ADMIN — CARBIDOPA AND LEVODOPA 1 TABLET: 50; 200 TABLET, EXTENDED RELEASE ORAL at 09:26

## 2023-02-23 RX ADMIN — IPRATROPIUM BROMIDE 0.5 MG: 0.5 SOLUTION RESPIRATORY (INHALATION) at 06:29

## 2023-02-23 RX ADMIN — ARFORMOTEROL TARTRATE 15 MCG: 15 SOLUTION RESPIRATORY (INHALATION) at 18:02

## 2023-02-23 RX ADMIN — SERTRALINE 50 MG: 50 TABLET, FILM COATED ORAL at 09:25

## 2023-02-23 RX ADMIN — SUCRALFATE 1 G: 1 TABLET ORAL at 13:39

## 2023-02-23 RX ADMIN — APIXABAN 5 MG: 5 TABLET, FILM COATED ORAL at 21:25

## 2023-02-23 RX ADMIN — ALBUTEROL SULFATE 2.5 MG: 2.5 SOLUTION RESPIRATORY (INHALATION) at 14:15

## 2023-02-23 RX ADMIN — Medication 10 ML: at 21:31

## 2023-02-23 RX ADMIN — ROPINIROLE HYDROCHLORIDE 1 MG: 1 TABLET, FILM COATED ORAL at 13:39

## 2023-02-23 RX ADMIN — ANTI-FUNGAL POWDER MICONAZOLE NITRATE TALC FREE: 1.42 POWDER TOPICAL at 21:27

## 2023-02-23 RX ADMIN — CARBIDOPA AND LEVODOPA 1 TABLET: 50; 200 TABLET, EXTENDED RELEASE ORAL at 13:39

## 2023-02-23 RX ADMIN — ATORVASTATIN CALCIUM 20 MG: 20 TABLET, FILM COATED ORAL at 21:25

## 2023-02-23 RX ADMIN — SUCRALFATE 1 G: 1 TABLET ORAL at 09:25

## 2023-02-23 RX ADMIN — MIRTAZAPINE 7.5 MG: 15 TABLET, FILM COATED ORAL at 21:25

## 2023-02-23 RX ADMIN — ARFORMOTEROL TARTRATE 15 MCG: 15 SOLUTION RESPIRATORY (INHALATION) at 06:29

## 2023-02-23 RX ADMIN — ROPINIROLE HYDROCHLORIDE 1 MG: 1 TABLET, FILM COATED ORAL at 21:25

## 2023-02-23 RX ADMIN — MIDODRINE HYDROCHLORIDE 10 MG: 5 TABLET ORAL at 13:39

## 2023-02-23 RX ADMIN — MONTELUKAST SODIUM 10 MG: 10 TABLET, FILM COATED ORAL at 21:26

## 2023-02-23 RX ADMIN — IPRATROPIUM BROMIDE 0.5 MG: 0.5 SOLUTION RESPIRATORY (INHALATION) at 18:02

## 2023-02-23 RX ADMIN — ACETAMINOPHEN 650 MG: 325 TABLET ORAL at 13:34

## 2023-02-23 RX ADMIN — ACETAMINOPHEN 650 MG: 325 TABLET ORAL at 00:01

## 2023-02-23 RX ADMIN — ALBUTEROL SULFATE 2.5 MG: 2.5 SOLUTION RESPIRATORY (INHALATION) at 06:29

## 2023-02-23 ASSESSMENT — PAIN DESCRIPTION - LOCATION
LOCATION: BACK
LOCATION: BACK

## 2023-02-23 ASSESSMENT — PAIN DESCRIPTION - ORIENTATION: ORIENTATION: LEFT;RIGHT

## 2023-02-23 ASSESSMENT — PAIN SCALES - GENERAL
PAINLEVEL_OUTOF10: 1
PAINLEVEL_OUTOF10: 4
PAINLEVEL_OUTOF10: 0

## 2023-02-23 ASSESSMENT — PAIN DESCRIPTION - DESCRIPTORS: DESCRIPTORS: ACHING

## 2023-02-23 NOTE — PROGRESS NOTES
Pulmonary Progress Note    SUMMARY:  Alvarez Galvez is a 67 y.o. female who presented with Acute Respiratory Failure     ISSUES:    Acute on chronic respiratory failure with hypoxia and hypercapnia (HCC)  CKD on HD  Transudative pleural effusion        Much improved respiratory status with hemodialysis   Minimal fluid level in pleural space  OK to remove pleural catheter    Reviewed with Dr. Aminta Blancas    ______________________________________________    SUBJECTIVE:  Improved respiratory status    OBJECTIVE:  No longer with significant pleural effusion since initiating hemodialysis           MEDICATIONS:   epoetin josé luis-epbx  3,000 Units SubCUTAneous Once per day on Mon Wed Fri    albuterol  2.5 mg Nebulization 4x daily    And    ipratropium  0.5 mg Nebulization 4x daily    magnesium oxide  400 mg Oral Daily    midodrine  10 mg Oral TID WC    heparin (porcine)  5,000 Units SubCUTAneous 3 times per day    metoprolol succinate  25 mg Oral Daily    [Held by provider] digoxin  125 mcg IntraVENous Daily    sucralfate  1 g Oral 4x Daily    rOPINIRole  1 mg Oral TID    atorvastatin  20 mg Oral Nightly    [Held by provider] insulin glargine  13 Units SubCUTAneous BID    [Held by provider] LORazepam  0.5 mg Oral Nightly    insulin lispro  0-4 Units SubCUTAneous TID WC    insulin lispro  0-4 Units SubCUTAneous Nightly    budesonide  0.5 mg Nebulization BID    sodium chloride flush  10 mL IntraVENous 2 times per day    miconazole   Topical BID    arformoterol tartrate  15 mcg Nebulization BID    pantoprazole  40 mg Oral QAM AC    [Held by provider] apixaban  5 mg Oral BID    mirtazapine  7.5 mg Oral Nightly    carbidopa-levodopa  1 tablet Oral TID    sertraline  50 mg Oral Daily    montelukast  10 mg Oral Nightly      dextrose      sodium chloride 25 mL/hr at 02/04/23 0222     glucose, dextrose bolus **OR** dextrose bolus, glucagon (rDNA), dextrose, sodium chloride, loperamide, sodium chloride flush, sodium chloride, promethazine **OR** ondansetron, polyethylene glycol, acetaminophen **OR** acetaminophen, glucagon (rDNA)          Vitals:    02/23/23 0757   BP: (!) 98/49   Pulse: (!) 46   Resp: 19   Temp: 97.6 °F (36.4 °C)   SpO2: 99%     FiO2 : 30 %  O2 Flow Rate (L/min): 1 L/min  O2 Device: High flow nasal cannula    PHYSICAL EXAM:  General Appearance: alert and oriented to person, place and time, in no acute distress  Cardiovascular: normal rate, regular rhythm, normal S1 and S2, no murmurs, rubs, clicks, or gallops, distal pulses intact, no carotid bruits, no JVD  Pulmonary/Chest: clear to auscultation bilaterally- no wheezes, rales or rhonchi, diminished  air movement, no respiratory distress  Abdomen: soft, non-tender, non-distended, normal bowel sounds, no masses   Extremities: no cyanosis, clubbing or edema, pulse   Skin: warm and dry, no rash or erythema  Head: normocephalic and atraumatic  Eyes: pupils equal, round, and reactive to light  Neck: supple and non-tender without mass, no thyromegaly   Musculoskeletal: normal range of motion, no joint swelling, deformity or tenderness  Neurological: alert, oriented, normal speech, no focal findings or movement disorder noted    Vent Information  Equipment Changed: Humidification    Additional Respiratory Assessments  Heart Rate: (!) 46  Resp: 19  SpO2: 99 %     URINARY CATHETER OUTPUT (Diaz):    [REMOVED] Urinary Catheter 01/18/23 Eprts-Rsjupxwdror-Firybh (mL): 75 mL    LABS:    WBC   Date Value Ref Range Status   02/20/2023 7.3 4.5 - 11.5 E9/L Final   02/19/2023 8.0 4.5 - 11.5 E9/L Final   02/18/2023 7.9 4.5 - 11.5 E9/L Final     Hemoglobin   Date Value Ref Range Status   02/20/2023 7.6 (L) 11.5 - 15.5 g/dL Final   02/19/2023 8.1 (L) 11.5 - 15.5 g/dL Final   02/18/2023 7.6 (L) 11.5 - 15.5 g/dL Final     Hematocrit   Date Value Ref Range Status   02/20/2023 26.6 (L) 34.0 - 48.0 % Final   02/19/2023 29.7 (L) 34.0 - 48.0 % Final   02/18/2023 28.4 (L) 34.0 - 48.0 % Final MCV   Date Value Ref Range Status   02/20/2023 88.4 80.0 - 99.9 fL Final   02/19/2023 90.3 80.0 - 99.9 fL Final   02/18/2023 93.7 80.0 - 99.9 fL Final     Platelets   Date Value Ref Range Status   02/20/2023 114 (L) 130 - 450 E9/L Final   02/19/2023 134 130 - 450 E9/L Final   02/18/2023 174 130 - 450 E9/L Final     Sodium   Date Value Ref Range Status   02/23/2023 135 132 - 146 mmol/L Final   02/20/2023 137 132 - 146 mmol/L Final   02/19/2023 135 132 - 146 mmol/L Final     Potassium   Date Value Ref Range Status   02/23/2023 3.8 3.5 - 5.0 mmol/L Final     Potassium reflex Magnesium   Date Value Ref Range Status   02/20/2023 3.6 3.5 - 5.0 mmol/L Final   02/19/2023 4.0 3.5 - 5.0 mmol/L Final   02/18/2023 3.7 3.5 - 5.0 mmol/L Final     Chloride   Date Value Ref Range Status   02/23/2023 96 (L) 98 - 107 mmol/L Final   02/20/2023 98 98 - 107 mmol/L Final   02/19/2023 95 (L) 98 - 107 mmol/L Final     CO2   Date Value Ref Range Status   02/23/2023 27 22 - 29 mmol/L Final   02/20/2023 29 22 - 29 mmol/L Final   02/19/2023 31 (H) 22 - 29 mmol/L Final     BUN   Date Value Ref Range Status   02/23/2023 13 6 - 23 mg/dL Final   02/20/2023 9 6 - 23 mg/dL Final   02/19/2023 17 6 - 23 mg/dL Final     Creatinine   Date Value Ref Range Status   02/23/2023 2.3 (H) 0.5 - 1.0 mg/dL Final   02/20/2023 1.5 (H) 0.5 - 1.0 mg/dL Final   02/19/2023 2.5 (H) 0.5 - 1.0 mg/dL Final     Glucose   Date Value Ref Range Status   02/23/2023 156 (H) 74 - 99 mg/dL Final   02/20/2023 97 74 - 99 mg/dL Final   02/19/2023 110 (H) 74 - 99 mg/dL Final     Calcium   Date Value Ref Range Status   02/23/2023 8.4 (L) 8.6 - 10.2 mg/dL Final   02/20/2023 8.4 (L) 8.6 - 10.2 mg/dL Final   02/19/2023 8.3 (L) 8.6 - 10.2 mg/dL Final     Total Protein   Date Value Ref Range Status   02/20/2023 6.3 (L) 6.4 - 8.3 g/dL Final   02/19/2023 5.9 (L) 6.4 - 8.3 g/dL Final   02/18/2023 6.0 (L) 6.4 - 8.3 g/dL Final     Albumin   Date Value Ref Range Status   02/20/2023 4.6 3.5 - 5.2 g/dL Final   02/19/2023 4.0 3.5 - 5.2 g/dL Final   02/18/2023 4.0 3.5 - 5.2 g/dL Final     Total Bilirubin   Date Value Ref Range Status   02/20/2023 0.7 0.0 - 1.2 mg/dL Final   02/19/2023 0.8 0.0 - 1.2 mg/dL Final   02/18/2023 0.5 0.0 - 1.2 mg/dL Final     Alkaline Phosphatase   Date Value Ref Range Status   02/20/2023 53 35 - 104 U/L Final   02/19/2023 59 35 - 104 U/L Final   02/18/2023 55 35 - 104 U/L Final     AST   Date Value Ref Range Status   02/20/2023 7 0 - 31 U/L Final   02/19/2023 14 0 - 31 U/L Final   02/18/2023 6 0 - 31 U/L Final     ALT   Date Value Ref Range Status   02/20/2023 <5 0 - 32 U/L Final   02/19/2023 <5 0 - 32 U/L Final   02/18/2023 <5 0 - 32 U/L Final     Est, Glom Filt Rate   Date Value Ref Range Status   02/23/2023 22 >=60 mL/min/1.73 Final     Comment:     Pediatric calculator link  [employer]. [email or web address]_[email or web address]  Effective Oct 3, 2022  These results are not intended for use in patients  <25years of age. eGFR results are calculated without  a race factor using the 2021 CKD-EPI equation. Careful  clinical correlation is recommended, particularly when  comparing to results calculated using previous equations. The CKD-EPI equation is less accurate in patients with  extremes of muscle mass, extra-renal metabolism of  creatinine, excessive creatinine ingestion, or following  therapy that affects renal tubular secretion. 02/20/2023 37 >=60 mL/min/1.73 Final     Comment:     Pediatric calculator link  [employer]. [email or web address]_[email or web address]  Effective Oct 3, 2022  These results are not intended for use in patients  <25years of age. eGFR results are calculated without  a race factor using the 2021 CKD-EPI equation. Careful  clinical correlation is recommended, particularly when  comparing to results calculated using previous equations.   The CKD-EPI equation is less accurate in patients with  extremes of muscle mass, extra-renal metabolism of  creatinine, excessive creatinine ingestion, or following  therapy that affects renal tubular secretion. 02/19/2023 20 >=60 mL/min/1.73 Final     Comment:     Pediatric calculator link  [email or web address]. [email or web address]_[email or web address]  Effective Oct 3, 2022  These results are not intended for use in patients  <25years of age. eGFR results are calculated without  a race factor using the 2021 CKD-EPI equation. Careful  clinical correlation is recommended, particularly when  comparing to results calculated using previous equations. The CKD-EPI equation is less accurate in patients with  extremes of muscle mass, extra-renal metabolism of  creatinine, excessive creatinine ingestion, or following  therapy that affects renal tubular secretion. GFR    Date Value Ref Range Status   10/16/2022 >60  Final   10/14/2022 59  Final   10/13/2022 >60  Final     Magnesium   Date Value Ref Range Status   02/17/2023 1.7 1.6 - 2.6 mg/dL Final   02/15/2023 1.6 1.6 - 2.6 mg/dL Final   02/13/2023 1.6 1.6 - 2.6 mg/dL Final     Phosphorus   Date Value Ref Range Status   02/17/2023 2.6 2.5 - 4.5 mg/dL Final   02/16/2023 1.1 (L) 2.5 - 4.5 mg/dL Final   02/13/2023 2.7 2.5 - 4.5 mg/dL Final     No results for input(s): PH, PO2, PCO2, HCO3, BE, O2SAT in the last 72 hours. No results for input(s): CULTRESP in the last 72 hours.     Lab Results   Component Value Date/Time    PH 7.266 02/06/2023 02:59 PM    PH 7.350 01/30/2023 05:30 AM    PH 7.271 01/28/2023 02:32 PM    PH 7.355 01/19/2023 05:09 AM    PO2 107.1 02/06/2023 02:59 PM    PO2 78.6 01/30/2023 05:30 AM    PO2 67.3 01/28/2023 02:32 PM    PO2 74.8 01/19/2023 05:09 AM    PCO2 101.4 02/06/2023 02:59 PM    PCO2 80.6 01/30/2023 05:30 AM    PCO2 95.5 01/28/2023 02:32 PM    PCO2 64.0 01/19/2023 05:09 AM    HCO3 45.1 02/06/2023 02:59 PM    HCO3 43.5 01/30/2023 05:30 AM    HCO3 43.0 01/28/2023 02:32 PM    HCO3 34.9 01/19/2023 05:09 AM    O2SAT 97.1 02/06/2023 02:59 PM    O2SAT 94.6 01/30/2023 05:30 AM    O2SAT 91.8 01/28/2023 02:32 PM    O2SAT 94.2 01/19/2023 05:09 AM       RADIOLOGY:  XR CHEST PORTABLE   Final Result   No sizable pleural effusion. XR CHEST PORTABLE   Final Result   Improvement in bilateral lung infiltrates as described. XR CHEST PORTABLE   Final Result   Diminished right pleural effusion. New infiltrate and or atelectasis at the   right upper lobe. Stable infiltrate and or atelectasis at the left lower   lobe. XR CHEST 1 VIEW   Final Result   Stable abnormal chest with slightly improved ventilation of the right lung   with persistent large right pleural effusion with infiltrates and atelectasis   in the right lung base and to a lesser extent in the left base. Right chest tube which may be partially kinked. There is no pneumothorax. IR GUIDED PERC PLEURAL DRAIN W CATH INSERT   Final Result   1. Successful placement of a PleurX catheter within the right pleural space   2. 1000 cc of fluid was aspirated. IR GUIDED THORACENTESIS PLEURAL   Final Result   Successful ultrasound guided thoracentesis. IR FLUORO GUIDED CVA DEVICE PLMT/REPLACE/REMOVAL   Final Result   Successful placement of a tunneled dialysis catheter via the right internal   jugular vein. Administration of conscious sedation. IR ULTRASOUND GUIDANCE VASCULAR ACCESS   Final Result   Successful placement of a tunneled dialysis catheter via the right internal   jugular vein. Administration of conscious sedation. XR CHEST PORTABLE   Final Result   Large right pleural effusion with likely adjacent compressive atelectasis. Suspected atelectasis at the posterior basal segment of the left lower lobe. XR CHEST PORTABLE   Final Result   Lesser amount of aeration of the right lung         XR CHEST PORTABLE   Final Result   1.  Slight improved aeration of the right lung when compared to prior study 2. The left lung is grossly clear   3. Cardiomegaly         XR CHEST PORTABLE   Final Result   1. New opacification seen within the right lung base which could represent   partial collapse of the right lower lobe   2. Reaccumulating right pleural effusion   3. Right upper lobe airspace disease   4. There is no pneumothorax   5. The left lung is clear. XR CHEST 1 VIEW   Final Result   Improved ventilation of the right lung, status post thoracentesis with   possible tiny less than 5% right apical pneumothorax. .      Cavitary lesion in the right midlung field which may either represent a   necrotic malignancy versus pneumatosis. Consider CT scan. IR GUIDED THORACENTESIS PLEURAL   Final Result   Successful ultrasound guided thoracentesis. XR CHEST PORTABLE   Final Result   Complete opacification of the right hemithorax related to pleural effusion. Modest contralateral infiltrate and or atelectasis at the left lower chest.         CT ABDOMEN PELVIS WO CONTRAST Additional Contrast? None   Final Result   No nephroureterolithiasis or hydronephrosis. Splenomegaly. Small volume of perihepatic ascites. At least moderate-sized partially imaged right pleural effusion with   bibasilar atelectasis. Several locules of gas which appear to be within the endometrium at the level   of the uterine fundus, of uncertain etiology or clinical significance. Please correlate clinically. Diffuse anasarca throughout the soft tissues. Cardiomegaly. XR CHEST PORTABLE   Final Result   Stable right pleural effusion.            (Independently reviewed by me)        Amelia Bunn M.D  Pulmonary & Critical Care  2/23/2023 12:50 PM

## 2023-02-23 NOTE — PROGRESS NOTES
Department of Internal Medicine  General Internal Medicine  Attending Progress Note  Chief Complaint   Patient presents with    Respiratory Distress     Normally on 3L NC, came in on CPAP, given total of 50mcg push dose epi for low BP by EMS     SUBJECTIVE:    Reports that she is doing well. Denied fever and chills. No chest pain. Noted that she is nauseous this morning. She is aware of plans to remove PleuRX catheter.     OBJECTIVE      Medications    Current Facility-Administered Medications: epoetin josé luis-epbx (RETACRIT) injection 3,000 Units, 3,000 Units, SubCUTAneous, Once per day on Mon Wed Fri  albuterol (PROVENTIL) nebulizer solution 2.5 mg, 2.5 mg, Nebulization, 4x daily **AND** ipratropium (ATROVENT) 0.02 % nebulizer solution 0.5 mg, 0.5 mg, Nebulization, 4x daily  glucose chewable tablet 16 g, 4 tablet, Oral, PRN  dextrose bolus 10% 125 mL, 125 mL, IntraVENous, PRN **OR** dextrose bolus 10% 250 mL, 250 mL, IntraVENous, PRN  glucagon (rDNA) injection 1 mg, 1 mg, SubCUTAneous, PRN  dextrose 10 % infusion, , IntraVENous, Continuous PRN  magnesium oxide (MAG-OX) tablet 400 mg, 400 mg, Oral, Daily  midodrine (PROAMATINE) tablet 10 mg, 10 mg, Oral, TID WC  [Held by provider] heparin (porcine) injection 5,000 Units, 5,000 Units, SubCUTAneous, 3 times per day  metoprolol succinate (TOPROL XL) extended release tablet 25 mg, 25 mg, Oral, Daily  [Held by provider] digoxin (LANOXIN) injection 125 mcg, 125 mcg, IntraVENous, Daily  sodium chloride (OCEAN, BABY AYR) 0.65 % nasal spray 1 spray, 1 spray, Each Nostril, PRN  sucralfate (CARAFATE) tablet 1 g, 1 g, Oral, 4x Daily  rOPINIRole (REQUIP) tablet 1 mg, 1 mg, Oral, TID  atorvastatin (LIPITOR) tablet 20 mg, 20 mg, Oral, Nightly  [Held by provider] insulin glargine (LANTUS) injection vial 13 Units, 13 Units, SubCUTAneous, BID  loperamide (IMODIUM) capsule 2 mg, 2 mg, Oral, 4x Daily PRN  [Held by provider] LORazepam (ATIVAN) tablet 0.5 mg, 0.5 mg, Oral, Nightly  insulin lispro (HUMALOG) injection vial 0-4 Units, 0-4 Units, SubCUTAneous, TID WC  insulin lispro (HUMALOG) injection vial 0-4 Units, 0-4 Units, SubCUTAneous, Nightly  budesonide (PULMICORT) nebulizer suspension 500 mcg, 0.5 mg, Nebulization, BID  sodium chloride flush 0.9 % injection 10 mL, 10 mL, IntraVENous, 2 times per day  sodium chloride flush 0.9 % injection 10 mL, 10 mL, IntraVENous, PRN  0.9 % sodium chloride infusion, , IntraVENous, PRN  promethazine (PHENERGAN) tablet 12.5 mg, 12.5 mg, Oral, Q6H PRN **OR** ondansetron (ZOFRAN) injection 4 mg, 4 mg, IntraVENous, Q6H PRN  polyethylene glycol (GLYCOLAX) packet 17 g, 17 g, Oral, Daily PRN  acetaminophen (TYLENOL) tablet 650 mg, 650 mg, Oral, Q6H PRN **OR** acetaminophen (TYLENOL) suppository 650 mg, 650 mg, Rectal, Q6H PRN  miconazole (MICOTIN) 2 % powder, , Topical, BID  arformoterol tartrate (BROVANA) nebulizer solution 15 mcg, 15 mcg, Nebulization, BID  pantoprazole (PROTONIX) tablet 40 mg, 40 mg, Oral, QAM AC  glucagon (rDNA) injection 1 mg, 1 mg, SubCUTAneous, PRN  [Held by provider] apixaban (ELIQUIS) tablet 5 mg, 5 mg, Oral, BID  mirtazapine (REMERON) tablet 7.5 mg, 7.5 mg, Oral, Nightly  carbidopa-levodopa (SINEMET CR)  MG per extended release tablet 1 tablet, 1 tablet, Oral, TID  sertraline (ZOLOFT) tablet 50 mg, 50 mg, Oral, Daily  montelukast (SINGULAIR) tablet 10 mg, 10 mg, Oral, Nightly  Physical    VITALS:  BP (!) 101/56   Pulse 51   Temp 98 °F (36.7 °C) (Oral)   Resp 19   Ht 5' (1.524 m)   Wt 213 lb 8 oz (96.8 kg)   SpO2 99%   BMI 41.70 kg/m²   CONSTITUTIONAL:  awake, cooperative, and no apparent distress  EYES:  extra-ocular muscles intact  ENT:  normocepalic, without obvious abnormality  NECK:  supple, symmetrical, trachea midline  LUNGS:  no increased work of breathing, no retractions, and clear to auscultation  CARDIOVASCULAR:  normal apical pulses and normal S1 and S2  ABDOMEN:  normal bowel sounds, non-distended, and non-tender  MUSCULOSKELETAL:  there is no redness, warmth, or swelling of the joints  NEUROLOGIC:  Mental Status Exam:  Level of Alertness:   awake  Orientation:   person, place, time  SKIN:  no bruising or bleeding  Data    CBC:   Lab Results   Component Value Date/Time    WBC 7.3 02/20/2023 01:42 PM    RBC 3.01 02/20/2023 01:42 PM    HGB 7.6 02/20/2023 01:42 PM    HCT 26.6 02/20/2023 01:42 PM    MCV 88.4 02/20/2023 01:42 PM    MCH 25.2 02/20/2023 01:42 PM    MCHC 28.6 02/20/2023 01:42 PM    RDW 19.9 02/20/2023 01:42 PM     02/20/2023 01:42 PM    MPV 12.5 02/20/2023 01:42 PM     BMP:    Lab Results   Component Value Date/Time     02/23/2023 11:03 AM    K 3.8 02/23/2023 11:03 AM    K 3.6 02/20/2023 01:42 PM    CL 96 02/23/2023 11:03 AM    CO2 27 02/23/2023 11:03 AM    BUN 13 02/23/2023 11:03 AM    LABALBU 4.6 02/20/2023 01:42 PM    CREATININE 2.3 02/23/2023 11:03 AM    CALCIUM 8.4 02/23/2023 11:03 AM    GFRAA >60 10/16/2022 09:20 AM    LABGLOM 22 02/23/2023 11:03 AM    GLUCOSE 156 02/23/2023 11:03 AM       ASSESSMENT AND PLAN      Acute on chronic respiratory failure with hypoxia and hypercapnia: Bipap at night. Continue to wean off oxygen  Palliative care encounter  DM type 2 complicated with nephropathy: diabetic diet. Continue to monitor  SHANTANU now on HD. UTI: completed Zyvox  Morbid obesity: lifestyle modifications  Pleural Effusion: Pleurx catheter inserted. Repeat X ray with no discernable effusion. Pleurx to be removed today. Congestive heart failure with diastolic dysfunction: continue Dialysis as means of fluid removal. Continue to monitor.

## 2023-02-23 NOTE — CARE COORDINATION
SS NOTE: COVID (-) 1/17- PT WILL NEED A RAPID NEGATIVE COVID TEST TO RETURN TO [facility]. Pt is on 1 liter of O2 and the bipap at night. Pt has a tunneled catheter. Pleurex cath placed 2/14 with 1000cc removed- continue to await further orders and whether pt will leave to return to the  with the pleurex. Pt is a long term care Resident at [facility]. [facility] will accept pt to return there. They will provide transport for outpt HD and they can also accept and care for the pleurx cath. - however they will need specific orders. [facility] shared pt's chair schedule today- M W F at 5:45AM. For the return to [facility] pt will need NO PRECERT, a SIGNED [place]. KRISHNA advised both pt and her dtr Donita Tan of this information today. Lilliana shared with this SW that she is calling in family since she was given a very poor prognosis last week by the Hospitalist.  SW to continue. ESTHER Morales.2/23/2023.10:55AM.      ADDENDUM TO ABOVE NOTE: IR removed pt's pleurex catheter this afternoon. Arrangements will be made if pt is stable to return to [facility] in the afternoon tomorrow. KRISHNA advised pt - she will contact her family to update them. SS to continue. ESTHER Morales.2/23/2023.5:21 PM,

## 2023-02-23 NOTE — PROGRESS NOTES
[facility] MD Sarah Yun, MD Tootie Block, MD Ruby Cohen, BRADLEY Devlin, NUNU Myers, BRADLEY  Progress Note    2/23/2023    SUBJECTIVE:   1/20: Feeling little better today. Denies new complaint. Still tachypnea, though denies dyspnea no cp/palp Appetite ok ROS otherwise unremarkable    1//21 seen in her room on o2/nc bp stable weak poor po intake   2+ edema in LE     1/22 charts reviewed . On bipap    1/23 : on o2/nc . Still look hypervolemic     1/24 seen in her room comfortable o2/nc . Still with LE edema which seems to be multifactorial and will likely need to accept having some edema on board     1/25 sitting on edge of the bed working with pt . Still with considerable edema in LEs   BP stable     1/26 seen in her room reports of SOB with minimal activity , LE edema still signficant . 1/27 good UO On 5 L/o2/nc  Still with sob with activity Tachycardiac with low functional capacity     1/28: Asleep, resting and breathing comfortably on nasal cannula. Remains edematous. Ongoing fatigue and generalized weakness. 1/29: Hypotensive last evening, Bumex drip stopped, IV normal saline bolus administered to effect. Breathing little more easily though still on AVAPS. To be downgraded to CPAP shortly. Thirsty. Still markedly edematous. 1/30: Somnolent, though arousable. On CPAP. Ongoing fatigue and malaise with generalized weakness    1/31 seen in her room , skin wrinkling in LE on HFNC . BP on lower side  For thoracentesis today   Dw RN .     2/1: Seen while laying in bed, no acute distress. Tells me that she is thirsty. She is on heated high flow nasal canula. Feels that her breathing has improved slightly. Plan is for thoracentesis later this afternoon, could not be done yesterday due to elevated INR. Still with skin wrinkling to bilateral lower extremities.      2/2 1 L removed with thoracentesis   O2 requirement better on 8 L o2/nc Very weak and deconditioned   Labile BP numbers    2/2 o2/nc LE UO ok remain weak decondtioned . Edema in LE getting somewhat worse    2/4 remain with significant depdent edema deconditioned in bed     2/5: Discussed with respiratory therapist: Just put on her CPAP after having tolerated nasal cannula throughout most of the day. Remains bedbound, severely weak and debilitated. Remains massively swollen, little change in the last 5 days. 2/6: Status quo. Somnolent but arousable. Desats and dyspneic without AVAPS. Remains massively edematous    2/7:  resp status is status quo. More awake, alert, interactive today. Resumed bumex gtt yesterday. 2/8 for dialysis line in am   Alert oriented   Remain markedly hypervolemic     2/9: Sitting up in bed, on Bipap. Family is present at bedside. Markedly edematous. She is asking me if [facility] [place] placement will hurt. She reports dyspnea, but feels better on Bipap. She denies chest pain or palpitations. Plan is for [facility] [place] catheter placement with IR today. 2/10: Seen while on HD. Tolerating without complications. Tells me she is feeling much better. Still edematous, but edema has improved substantially. She is on nasal canula, eating breakfast. She had 2.7 L removed yesterday and 3L removed today. 2/11: Dialysis today notable for hypotension and only 2.1 L UF. Currently asleep, denies new complaint. Ongoing fatigue, malaise, generalized weakness. 2/12: Somewhat less anxious than she had been over the past several days. Did tolerate roughly 3 hours off of CPAP consecutively yesterday. Still mostly using CPAP throughout the day. Swelling better. 2/13: Seen while in dialysis. Tolerating ultrafiltration without complications. Plan is to remove 3L today. Blood pressure is stable. She is on nasal canula. She tells me that she goes back and forth between nasal canula and CPAP. Plan is for a right sided pleurix catheter to be placed later today.      2/14: Seen while in dialysis. Tolerating treatment without complication. Blood pressure is stable. She is on nasal canula. Tells me she continues to feel better each day. Still with significant abdominal swelling. 2/15:  Laying in bed on BiPap. She has mild shortness of breath. BP remains low. She denies chest pain. Appetite is fair. 2/16: Seen while on hemodialysis. Tolerating treatment without complication. Blood pressure has improved and is stable. She denies dizziness, chest pain, dyspnea, or palpitations. Tells me she is tolerating being off the BIPAP for longer periods. Appetite is good. 2/17 swelling improved   Clinically better  O2 requirement better  Remain weak and deconditioned and basically bedbound     2/18 seen in her HD today with no reported issues o2 requirment continue to improve edema better   Need to work with pt/ot . She is basically bedbound     2/20: Seen while laying in bed. Tolerated HD earlier today. 1 liter of fluid removed. Her edema has improved substantially. She tells me she is feeling much better. She is on nasal canula. She tells me she is still making urine. 2/22: Seen while on dialysis. Tolerating treatment without complications. She is now down to 1 L of oxygen via nasal canula. She tells me that she is feeling much better. Does complain of slight pain at pleurix catheter site. Swelling has improved substantially. She reports that she is not urinating. 2/23 seen in her room lying in bed comfortable o2/nc edema way better       PROBLEM LIST:    Principal Problem:    Acute on chronic respiratory failure with hypoxia and hypercapnia (HCC)  Active Problems:    Palliative care encounter  Resolved Problems:    * No resolved hospital problems. *         DIET:    ADULT DIET; Regular; 3 carb choices (45 gm/meal); Low Fat/Low Chol/High Fiber/2 gm Na; Moderately Thick (Honey); 1200 ml  ADULT ORAL NUTRITION SUPPLEMENT; Lunch, Dinner;  Other Oral Supplement; Gelatein 20     MEDS (scheduled):    epoetin josé luis-epbx  3,000 Units SubCUTAneous Once per day on Mon Wed Fri    albuterol  2.5 mg Nebulization 4x daily    And    ipratropium  0.5 mg Nebulization 4x daily    magnesium oxide  400 mg Oral Daily    midodrine  10 mg Oral TID     heparin (porcine)  5,000 Units SubCUTAneous 3 times per day    metoprolol succinate  25 mg Oral Daily    [Held by provider] digoxin  125 mcg IntraVENous Daily    sucralfate  1 g Oral 4x Daily    rOPINIRole  1 mg Oral TID    atorvastatin  20 mg Oral Nightly    [Held by provider] insulin glargine  13 Units SubCUTAneous BID    [Held by provider] LORazepam  0.5 mg Oral Nightly    insulin lispro  0-4 Units SubCUTAneous TID WC    insulin lispro  0-4 Units SubCUTAneous Nightly    budesonide  0.5 mg Nebulization BID    sodium chloride flush  10 mL IntraVENous 2 times per day    miconazole   Topical BID    arformoterol tartrate  15 mcg Nebulization BID    pantoprazole  40 mg Oral QAM AC    [Held by provider] apixaban  5 mg Oral BID    mirtazapine  7.5 mg Oral Nightly    carbidopa-levodopa  1 tablet Oral TID    sertraline  50 mg Oral Daily    montelukast  10 mg Oral Nightly       MEDS (infusions):   dextrose      sodium chloride 25 mL/hr at 02/04/23 0222       MEDS (prn):  glucose, dextrose bolus **OR** dextrose bolus, glucagon (rDNA), dextrose, sodium chloride, loperamide, sodium chloride flush, sodium chloride, promethazine **OR** ondansetron, polyethylene glycol, acetaminophen **OR** acetaminophen, glucagon (rDNA)    PHYSICAL EXAM:     Patient Vitals for the past 24 hrs:   BP Temp Temp src Pulse Resp SpO2 Weight   02/23/23 0757 (!) 98/49 97.6 °F (36.4 °C) Oral (!) 46 19 99 % --   02/23/23 0548 -- -- -- -- -- -- 213 lb 8 oz (96.8 kg)   02/23/23 0400 (!) 99/55 97.4 °F (36.3 °C) Axillary 53 20 99 % --   02/23/23 0215 -- -- -- -- -- 99 % --   02/23/23 0000 108/74 98.5 °F (36.9 °C) Oral 60 29 97 % --   02/22/23 2215 104/80 98.1 °F (36.7 °C) Oral 62 18 94 % --   02/22/23 1400 (!) 117/55 97.8 °F (36.6 °C) Oral 63 16 100 % --   02/22/23 1308 (!) 107/49 97.9 °F (36.6 °C) -- -- 16 -- 221 lb 1.9 oz (100.3 kg)   02/22/23 1229 (!) 107/56 -- -- 52 -- -- --   02/22/23 1159 (!) 95/43 -- -- (!) 44 -- -- --   02/22/23 1129 (!) 92/40 -- -- (!) 48 -- -- --   02/22/23 1100 (!) 86/38 -- -- (!) 46 -- -- --   02/22/23 1046 (!) 88/38 -- -- (!) 45 -- -- --   02/22/23 1030 (!) 96/45 -- -- (!) 47 -- -- --   02/22/23 0959 (!) 97/42 -- -- 53 -- -- --   02/22/23 0930 (!) 135/47 97.7 °F (36.5 °C) -- 65 -- -- --     @      Intake/Output Summary (Last 24 hours) at 2/23/2023 0920  Last data filed at 2/22/2023 1308  Gross per 24 hour   Intake 605 ml   Output 685 ml   Net -80 ml           Wt Readings from Last 3 Encounters:   02/23/23 213 lb 8 oz (96.8 kg)   01/16/23 259 lb (117.5 kg)   01/16/23 259 lb (117.5 kg)     Age-appropriate morbidly obese white woman, though easily arousable, no apparent distress  NC/AT EOMI sclera conjunctive are clear and anicteric mucous membranes are moist  Neck soft supple trachea midline  Lungs:  Diminished bilaterally. Poor inspiratory effort. Right pleurix catheter   Regular rhythm normal S1 and S2  Abdomen soft nontender nondistended normal active bowel sounds. Abdominal pannus has edema (+1 pitting), improving  Distal extremities, thighs, sacrum have substantial chronic type nonpitting edema, trace BLE edema.    Pulses 1+ x4  Moves all 4 extremities  Cranial nerves II through XII grossly intact  Awake, alert, interactive and appropriate  Skin tone consistent with chronic venous stasis distally      DATA:    Recent Labs     02/20/23  1342   WBC 7.3   HGB 7.6*   HCT 26.6*   MCV 88.4   *           Recent Labs     02/20/23  1342      K 3.6   CL 98   CO2 29   BUN 9   CREATININE 1.5*   ALT <5   AST 7   BILITOT 0.7   ALKPHOS 53         Lab Results   Component Value Date    LABPROT 0.5 (H) 01/18/2023    LABPROT 0.5 01/18/2023     On CT no hydro/signs of obstruction     Urine studies  U Na 21, U Cl 23 U prot:cr ratio 0.5    ASSESSMENT / RECOMMENDATIONS:       Assessment  1. Acute kidney injury urine lytes support decrease effective volume     2. CKD stage III, Baseline creatinine 1.4 to 1.7 mg/dL, secondary to diabetic nephropathy and renal microvascular atherosclerotic disease. 0.5 g proteinuria     3. Anemia due to CKD, phlebotomy associated prolonged hospitalization     4. Acute hypercapnic and hypoxic respiratory failure due to COPD exacerbation and pneumonia. Does not appear hypervolemic. 5.  Morbid obesity, BMI 50.6 kg per metered square     6. Edema/anasarca, chronic, multifactorial, due to venous insufficiency in the setting of morbid obesity, relative immobility, likely diastolic dysfunction though it was \"indeterminate\" on echo, the does have pulmonary hypertension, mild right-sided heart failure    7. Hypotension    8.   Elevated bicarbonate represents compensation for chronic respiratory acidosis, as well as likely contraction alkalosis due to diuresis on the Bumex drip       Recommendations  -continue  HD for solute and volume management per MWF schedule  - Retacrit 3000 u sc MWF (switch to mircera as outpt)  -Continue Midodrine 10 mg TID   -PT/OT   -fluid and salt restricted diet to minimize need for extra UF treatment   -SW for HD unit placement-referral made to [facility]       Electronically signed by Jose Armando Alonzo MD on 2/23/2023

## 2023-02-23 NOTE — PROGRESS NOTES
Physical Therapy  Physical Therapy Treatment Note/Plan of Care    Room #:  0452/9011-98  Patient Name: Aleta Olivia  YOB: 1950  MRN: [id number]    Date of Service: 2/23/2023     Tentative placement recommendation: Subacute Rehab  Equipment recommendation:  To be determined      Evaluating Physical Therapist: Tory Guillermo, PT, DPT #[id number]      Specific Provider Orders/Date/Referring Provider :     01/19/23 0745    PT eval and treat  Start:  01/19/23 0745,   End:  01/19/23 0745,   ONE TIME,   Standing Count:  1 Occurrences,   Karoline Salguero MD Acknowledge New     Admitting Diagnosis:   NSTEMI (non-ST elevated myocardial infarction) [facility]) [I21.4]  Atrial fibrillation with rapid ventricular response (HCC) [I48.91]  Recurrent right pleural effusion [J90]  Hypotension, unspecified hypotension type [I95.9]  Acute on chronic respiratory failure with hypoxia and hypercapnia (HCC) [J96.21, J96.22]      Surgery: none  Visit Diagnoses         Codes    Recurrent right pleural effusion     J90    NSTEMI (non-ST elevated myocardial infarction) ([facility] [address]     I21.4    Postprocedural pneumothorax     J95.811            Patient Active Problem List   Diagnosis    Depression with anxiety    Osteoporosis    Asthma    Hyperlipidemia    Mitral regurgitation    Obstructive sleep apnea syndrome    Psoriasis    Diabetes mellitus ([facility] [address]    Parkinson's disease ([facility] [address]    Primary hypertension    Microalbuminuria    Morbid obesity (HCC)    RLS (restless legs syndrome)    Generalized weakness    Inability to walk    Hypothyroidism    Chest pain    Acute asthma exacerbation    Asthma exacerbation, mild    Paroxysmal atrial fibrillation (HCC)    Atrial fibrillation with rapid ventricular response (HCC)    Acute on chronic congestive heart failure ([address]    Coronary artery disease involving native coronary artery of native heart without angina pectoris    Dysphagia    Hepatosplenomegaly    Acute decompensated heart failure ([facility] [address]    Acute diastolic (congestive) heart failure (HCC)    Nonrheumatic tricuspid valve regurgitation    Tobacco abuse    Recurrent syncope    Septic shock (HCC)    Seizure-like activity (HCC)    Acute kidney injury ([facility] [address]    Pancytopenia (HCC)    Thrombocytopenia (HCC)    Encephalopathy    Acute respiratory failure with hypoxia (HCC)    Lactic acidosis    Delirium    Acute on chronic anemia    Pleural effusion, right    Acute respiratory failure with hypoxia and hypercapnia (HCC)    Acute confusion    Acute on chronic diastolic heart failure (HCC)    Macrocytosis    Other specified anemias    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[facility] [address]    Puerperal sepsis with acute hypoxic respiratory failure without septic shock (HCC)    Pulmonary HTN (HCC)    Chronic anticoagulation    RVF (right ventricular failure) (HCC)    Metabolic alkalosis    Sepsis (HCC)    COPD exacerbation (HCC)    Acute on chronic respiratory failure with hypercapnia (HCC)    Persistent atrial fibrillation (HCC)    Hypercapnic respiratory failure (HCC)    Acute on chronic respiratory failure (HCC)    Hyperkalemia    Heart failure (HCC)    Acute renal insufficiency    Hypotension    Anemia    Acute on chronic respiratory failure with hypoxia and hypercapnia (HCC)    Palliative care encounter        ASSESSMENT of Current Deficits Patient exhibits decreased strength, balance, and endurance impairing functional mobility, transfers, gait , gait distance, and tolerance to activity. Patient sat EOB and performed seated exercises sitting EOB sitting EOB. Pt was unable to stand with 2 attempts, and required Max/DepA to scoot toward [facility] with bed in Trendelenburg position.        PHYSICAL THERAPY  PLAN OF CARE       Physical therapy plan of care is established based on physician order,  patient diagnosis and clinical assessment    Current Treatment Recommendations:    -Bed Mobility: Lower extremity exercises  and Trunk control activities   -Sitting Balance: Incorporate reaching activities to activate trunk muscles , Hands on support to maintain midline , Facilitate active trunk muscle engagement , Facilitate postural control in all planes , and Engage in core activities to allow for movement within base of support   -Standing Balance: Perform strengthening exercises in standing to promote motor control with or without upper extremity support , Instruct patient on adequate base of support to maintain balance, and Challenge balance utilizing reaching  activities beyond center of gravity    -Transfers: Provide instruction on proper hand and foot position for adequate transfer of weight onto lower extremities and use of gait device if needed, Cues for hand placement, technique and safety. Provide stabilization to prevent fall , Facilitate weight shift forward on to lower extremities and provide necessary stabilization of bilateral lower extremities , Support transfer of weight on to lower extremities, and Assist with extension of knees trunk and hip to accept weight transfer   -Gait: Gait training, Standing activities to improve: base of support, weight shift, weight bearing , Exercises to improve trunk control, Exercises to improve hip and knee control, Performance of protected weight bearing activities, and Activities to increase weight bearing   -Endurance: Utilize Supervised activities to increase level of endurance to allow for safe functional mobility including transfers and gait  and Use graduated activities to promote good breathing techniques and provide support and education to maximize respiratory function    PT long term treatment goals are located in below grid    Patient and or family understand(s) diagnosis, prognosis, and plan of care. Frequency of treatments: Patient will be seen  3-5 times/week.          Prior Level of Function: Patient ambulated with wheeled walker for short distances  Rehab Potential: fair + for baseline    Past medical history: Past Medical History:   Diagnosis Date    A-fib [facility])     Acute on chronic congestive heart failure (HCC)     Anxiety     Asthma     CAD (coronary artery disease) 01/21/2016    Cancer ([facility] [address]  breast ca 2006    right lumpectomy    Chronic kidney disease     nephrolithiasis    COPD exacerbation ([facility] [address] 10/12/2022    Depression     Diabetes mellitus ([facility] [address]     H/O mammogram     Hx MRSA infection     toe infection january 2012    Hyperlipidemia     Hypertension     Lateral epicondylitis     Morbid obesity (HCC)     SERENA on CPAP     Oxygen dependent     Parkinson's disease ([facility] [address]     Tubal ligation status      Past Surgical History:   Procedure Laterality Date    BREAST LUMPECTOMY      BREAST REDUCTION SURGERY      CARDIAC CATHETERIZATION  4/28/2014    Dr. Neeta Hill  01/11/2022    Dr. Sandie Ramirez  7/29/15    CT GUIDED CHEST TUBE  7/8/2022    CT GUIDED CHEST TUBE 7/8/2022 Shilpi Cobian MD [facility]    ENDOSCOPY, COLON, DIAGNOSTIC  7/19/15    GALLBLADDER SURGERY      IR PERC CATH PLEURAL DRAIN W/IMAG  2/14/2023    IR PERC CATH PLEURAL DRAIN W/IMAG 2/14/2023 [facility] PROCED[facility]    LUMBAR LAMINECTOMY      TOE AMPUTATION      TONSILLECTOMY      UPPER GASTROINTESTINAL ENDOSCOPY      UPPER GASTROINTESTINAL ENDOSCOPY N/A 7/19/2022    EGD ESOPHAGOGASTRODUODENOSCOPY performed by Jerry Gunter MD at [address] [place] [address]:    Precautions:  Up with assistance, falls, O2, and morbid obesity      Social history: Patient from SNF    Equipment owned: Wheelchair, Wheeled Walker, and [id number] - Inpatient   How much help is needed turning from your back to your side while in a flat bed without using bedrails?: A Lot  How much help is needed moving from lying on your back to sitting on the side of a flat bed without using bedrails?: A Lot  How much help is needed moving to and from a bed to a chair?: Total  How much help is needed standing up from a chair using your arms?: Total  How much help is needed walking in hospital room?: Total  How much help is needed climbing 3-5 steps with a railing?: Total  AM-PAC Inpatient Mobility Raw Score : 8  AM-PAC Inpatient T-Scale Score : 28.52  Mobility Inpatient CMS 0-100% Score: 86.62  Mobility Inpatient CMS G-Code Modifier : CM    Nursing cleared patient for PT treatment. Patient c/o being tired. OBJECTIVE;   Initial Evaluation  Date: 1/19/2023 Treatment Date:  2/23/2023       Short Term/ Long Term   Goals   Was pt agreeable to Eval/treatment? Yes yes To be met in 3 days   Pain level   0/10   0/10    Bed Mobility    Rolling: Maximal assist of 1    Supine to sit: Maximal assist of 1    Sit to supine: Maximal assist of 1    Scooting: Maximal assist of 1   Rolling: Moderate assist of 1   Supine to sit: Moderate assist of 1   Sit to supine: Moderate assist of 1   Scooting:  Max/DepA x 2     Rolling: Minimal assist of 1    Supine to sit: Minimal assist of 1    Sit to supine: Minimal assist of 1    Scooting: Minimal assist of 1     Transfers Sit to stand: Not assessed  d/t fair- seated balance and pt declining standing Sit to stand: Not assessed  unable upon 2 attempts      Sit to stand:  Moderate assist of 1    Ambulation    not assessed  not assessed    > 15 feet using  wheeled walker with Moderate assist of 1    Stair negotiation: ascended and descended   Not assessed           ROM Within functional limits    Increase range of motion 10% of affected joints    Strength BUE:  refer to OT eval  RLE:  4-/5  LLE:  4-/5  Increase strength in affected mm groups by 1/3 grade   Balance Sitting EOB:  fair -  Dynamic Standing:  not assessed  Sitting EOB: fair   Dynamic Standing: not assessed    Sitting EOB:  fair   Dynamic Standing: fair - with [employer]     Patient is Alert & Oriented x person, place, time, and situation and follows directions    Sensation:  Patient  denies numbness/tingling   Edema:  no   Endurance: fair -    Vitals:  1 liters nasal cannula   Blood Pressure at rest  Blood Pressure during session    Heart Rate at rest  Heart Rate during session    SPO2 at rest %  SPO2 during session 92-95%     Patient education  Patient educated on role of Physical Therapy, risks of immobility, safety and plan of care, energy conservation,  importance of mobility while in hospital , ankle pumps, quad set and glut set for edema control, blood clot prevention, safety , and positioning for skin integrity and comfort     Patient response to education:   Pt verbalized understanding Pt demonstrated skill Pt requires further education in this area   Yes Partial Yes      Treatment:  Patient practiced and was instructed/facilitated in the following treatment: Patient performed supine BLE     Therapeutic Exercises:  ankle pumps, heel raises, hip abduction/adduction, long arc quad, and seated marching, 15 - 20 reps      At end of session, patient in bed with     call light and phone within reach,  all lines and tubes intact, nursing notified. Patient would benefit from continued skilled Physical Therapy to improve functional independence and quality of life.          Patient's/ family goals   rehab    Time in  1128  Time out  1158    Total Treatment Time  30 minutes      CPT codes:  Therapeutic activities (25797)   18 minutes  1 unit(s)  Therapeutic exercises (81623)   12 minutes  1 unit(s)    Hu Arguello PT License Number [id number]

## 2023-02-23 NOTE — PROGRESS NOTES
Patient came down to Special Procedures for removal of right pleural space pleurx catheter    Procedure was explained, questions were answered. 1543  Starting procedure /54 53 18 100% on 2 liters nasal canula     1544 Ending procedure /33 52 19 95% on 2 liters nasal canula      petrolatum dressing folded 4 x 4 and tegaderm applied to right back. Patient tolerated procedure    Post procedure chest xray taken    Nurse to nurse called spoke with Yanna Veras RN, nurse notified of above information    Patient transported back to floor.

## 2023-02-23 NOTE — PROGRESS NOTES
met with patient. Patient stated she was feeling better and looking forward to rehab and getting better.  offered a listening ear, explored hopes for the future and nurtured hope. Spiritual care is ongoing.

## 2023-02-24 VITALS
TEMPERATURE: 97.9 F | HEART RATE: 61 BPM | HEIGHT: 60 IN | SYSTOLIC BLOOD PRESSURE: 112 MMHG | WEIGHT: 214.95 LBS | RESPIRATION RATE: 18 BRPM | BODY MASS INDEX: 42.2 KG/M2 | OXYGEN SATURATION: 97 % | DIASTOLIC BLOOD PRESSURE: 56 MMHG

## 2023-02-24 LAB
METER GLUCOSE: 109 MG/DL (ref 74–99)
METER GLUCOSE: 129 MG/DL (ref 74–99)
SARS-COV-2, NAAT: NOT DETECTED

## 2023-02-24 PROCEDURE — 87635 SARS-COV-2 COVID-19 AMP PRB: CPT

## 2023-02-24 PROCEDURE — 6370000000 HC RX 637 (ALT 250 FOR IP): Performed by: NURSE PRACTITIONER

## 2023-02-24 PROCEDURE — 6370000000 HC RX 637 (ALT 250 FOR IP): Performed by: INTERNAL MEDICINE

## 2023-02-24 PROCEDURE — 6360000002 HC RX W HCPCS: Performed by: INTERNAL MEDICINE

## 2023-02-24 PROCEDURE — 2580000003 HC RX 258: Performed by: INTERNAL MEDICINE

## 2023-02-24 PROCEDURE — 2700000000 HC OXYGEN THERAPY PER DAY

## 2023-02-24 PROCEDURE — 90935 HEMODIALYSIS ONE EVALUATION: CPT

## 2023-02-24 PROCEDURE — 6360000002 HC RX W HCPCS

## 2023-02-24 PROCEDURE — 6360000002 HC RX W HCPCS: Performed by: NURSE PRACTITIONER

## 2023-02-24 PROCEDURE — 94640 AIRWAY INHALATION TREATMENT: CPT

## 2023-02-24 PROCEDURE — 97110 THERAPEUTIC EXERCISES: CPT | Performed by: PHYSICAL THERAPIST

## 2023-02-24 PROCEDURE — 94660 CPAP INITIATION&MGMT: CPT

## 2023-02-24 PROCEDURE — 82962 GLUCOSE BLOOD TEST: CPT

## 2023-02-24 PROCEDURE — P9047 ALBUMIN (HUMAN), 25%, 50ML: HCPCS | Performed by: INTERNAL MEDICINE

## 2023-02-24 PROCEDURE — 99239 HOSP IP/OBS DSCHRG MGMT >30: CPT | Performed by: INTERNAL MEDICINE

## 2023-02-24 RX ORDER — ALBUMIN (HUMAN) 12.5 G/50ML
25 SOLUTION INTRAVENOUS ONCE
Status: COMPLETED | OUTPATIENT
Start: 2023-02-24 | End: 2023-02-24

## 2023-02-24 RX ORDER — METOPROLOL SUCCINATE 25 MG/1
25 TABLET, EXTENDED RELEASE ORAL DAILY
Qty: 30 TABLET | Refills: 3 | DISCHARGE
Start: 2023-02-24

## 2023-02-24 RX ADMIN — MIDODRINE HYDROCHLORIDE 10 MG: 5 TABLET ORAL at 13:55

## 2023-02-24 RX ADMIN — ROPINIROLE HYDROCHLORIDE 1 MG: 1 TABLET, FILM COATED ORAL at 13:47

## 2023-02-24 RX ADMIN — ANTI-FUNGAL POWDER MICONAZOLE NITRATE TALC FREE: 1.42 POWDER TOPICAL at 13:52

## 2023-02-24 RX ADMIN — EPOETIN ALFA-EPBX 3000 UNITS: 3000 INJECTION, SOLUTION INTRAVENOUS; SUBCUTANEOUS at 13:51

## 2023-02-24 RX ADMIN — ALBUTEROL SULFATE 2.5 MG: 2.5 SOLUTION RESPIRATORY (INHALATION) at 06:32

## 2023-02-24 RX ADMIN — ALBUMIN (HUMAN) 25 G: 0.25 INJECTION, SOLUTION INTRAVENOUS at 11:07

## 2023-02-24 RX ADMIN — ARFORMOTEROL TARTRATE 15 MCG: 15 SOLUTION RESPIRATORY (INHALATION) at 06:32

## 2023-02-24 RX ADMIN — SUCRALFATE 1 G: 1 TABLET ORAL at 13:48

## 2023-02-24 RX ADMIN — Medication 10 ML: at 13:55

## 2023-02-24 RX ADMIN — SERTRALINE 50 MG: 50 TABLET, FILM COATED ORAL at 13:47

## 2023-02-24 RX ADMIN — IPRATROPIUM BROMIDE 0.5 MG: 0.5 SOLUTION RESPIRATORY (INHALATION) at 06:32

## 2023-02-24 RX ADMIN — MIDODRINE HYDROCHLORIDE 10 MG: 5 TABLET ORAL at 02:46

## 2023-02-24 RX ADMIN — MAGNESIUM OXIDE 400 MG: 400 TABLET ORAL at 13:50

## 2023-02-24 RX ADMIN — CARBIDOPA AND LEVODOPA 1 TABLET: 50; 200 TABLET, EXTENDED RELEASE ORAL at 13:47

## 2023-02-24 RX ADMIN — APIXABAN 5 MG: 5 TABLET, FILM COATED ORAL at 13:48

## 2023-02-24 RX ADMIN — METOPROLOL SUCCINATE 25 MG: 25 TABLET, FILM COATED, EXTENDED RELEASE ORAL at 13:46

## 2023-02-24 RX ADMIN — BUDESONIDE 500 MCG: 0.5 SUSPENSION RESPIRATORY (INHALATION) at 06:32

## 2023-02-24 RX ADMIN — PANTOPRAZOLE SODIUM 40 MG: 40 TABLET, DELAYED RELEASE ORAL at 05:42

## 2023-02-24 NOTE — FLOWSHEET NOTE
02/24/23 1236   Vital Signs   BP (!) 105/48   Temp 98 °F (36.7 °C)   Heart Rate 60   Resp 18   Weight 214 lb 15.2 oz (97.5 kg)   Weight Method Bed scale   Percent Weight Change -1.32   Post-Hemodialysis Assessment   Post-Treatment Procedures Blood returned;Catheter capped, clamped with Citrate x 2 ports   Machine Disinfection Process Acid/Vinegar Clean;Heat Disinfect; Exterior Machine Disinfection   Rinseback Volume (ml) 300 ml   Blood Volume Processed (Liters) 59.2 l/min   Dialyzer Clearance Lightly streaked   Duration of Treatment (minutes) 240 minutes   Hemodialysis Intake (ml) 300 ml   Hemodialysis Output (ml) 1500 ml   NET Removed (ml) 1200   Tolerated Treatment Good   Patient Response to Treatment tolerated tx well. removed 1.2L   Time Off 1236   Patient Disposition Return to room

## 2023-02-24 NOTE — PLAN OF CARE
Problem: Respiratory - Adult  Goal: Achieves optimal ventilation and oxygenation  Recent Flowsheet Documentation  Taken 2/24/2023 0734 by Ashok Denver, RN  Achieves optimal ventilation and oxygenation: Assess for changes in respiratory status     Problem: Cardiovascular - Adult  Goal: Maintains optimal cardiac output and hemodynamic stability  Recent Flowsheet Documentation  Taken 2/24/2023 0734 by Ashok Denver, RN  Maintains optimal cardiac output and hemodynamic stability: Monitor blood pressure and heart rate  Goal: Absence of cardiac dysrhythmias or at baseline  Recent Flowsheet Documentation  Taken 2/24/2023 0734 by Ashok Denver, RN  Absence of cardiac dysrhythmias or at baseline: Monitor cardiac rate and rhythm     Problem: Genitourinary - Adult  Goal: Absence of urinary retention  Recent Flowsheet Documentation  Taken 2/24/2023 0734 by Ashok Denver, RN  Absence of urinary retention: Assess patients ability to void and empty bladder     Problem: Safety - Adult  Goal: Free from fall injury  Recent Flowsheet Documentation  Taken 2/24/2023 0759 by Ashok Denver, RN  Free From Fall Injury: Instruct family/caregiver on patient safety     Problem: ABCDS Injury Assessment  Goal: Absence of physical injury  Recent Flowsheet Documentation  Taken 2/24/2023 0759 by Ashok Denver, RN  Absence of Physical Injury: Implement safety measures based on patient assessment

## 2023-02-24 NOTE — DISCHARGE SUMMARY
Physician Discharge Summary     Patient ID:  Shine Holland  [id number]  35 y.o.  1950      Mrs. Veria Duane requires Bipap at night and as needed for day time sleep. Admit date: 1/17/2023    Discharge date and time: No discharge date for patient encounter. Admitting Physician: Salima Dumont MD     [facility]    Admission Diagnoses: NSTEMI (non-ST elevated myocardial infarction) [facility]) [I21.4]  Atrial fibrillation with rapid ventricular response ([address] [I48.91]  Recurrent right pleural effusion [J90]  Hypotension, unspecified hypotension type [I95.9]  Acute on chronic respiratory failure with hypoxia and hypercapnia ([address] [J96.21, J96.22]    Discharge Diagnoses:   Acute on chronic Congestive heart failure with Diastolic dysfunction  Pleural effusion ( S/P PleuRX placement and subsequent removal)  Hypotension  DM type 2 in obese patient complicated with nephropathy  Acute on chronic Hypercapenic respiratory failure  Atrial Fibrillation  Morbid Obesity. SERENA  COPD not in exaceerbation  Anemia/Thrombocytopenia. Parkinson's disease  Acute on chronic kidney Disease now on HD. UTI: completed Zyvox    Admission Condition: poor    Discharged Condition: fair    Indication for Admission:     Hospital Course:   Mrs. Veria Duane has prolonged hospital stay. Patient is admitted with dyspnea. She was also noted to be hypotensive. She was treated with midodrine with significant improvement. Patient was found to be in fluid overload. She was also found to be hypercapnic. She was treated with IV Bumex drip with minimal success. Nephro was consulted and after a long conversation with patient and refusal for dialysis, patient did agree to dialysis. She was treated with Bipap and was gradually weaned off bipap to nocturnal Bipap and as needed basis during day time nap. She had pleural effusion and had several episodes of thoracentesis. She had chest tube placed.  Repeat chest X ray before discharge showed no pleural effusion. With pulm consultation, chest tube was removed at the time of discharge. Patient was treated with Digoxin, but this was held due to toxicity. At discharged, this was discontinued. Patient's metoprolol was adjusted. Insulin was discontinued at discharge as patient BGL was well regulated during hospitalization since insulin was held. At discharge, patient was noted to have thrombocytopenia. Both patients daughter's daughter noted that they reviewed patient's chart and saw low platelet. I did explain to them that this is most likely related to heparin that was given when eliquis was held for pleurx placement. They were reassured that her mother doesn't need platelet transfusion and will be monitored and admitted if platelet doesn't improve. Time spent in discharge of patient is greater than 30 minutes. Consults: cardiology, pulmonary/intensive care, and nephrology    Significant Diagnostic Studies: labs:     Treatments: antibiotics: Zyvox and dialysis: Hemodialysis    Discharge Exam:  BP (!) 112/56   Pulse 61   Temp 98 °F (36.7 °C)   Resp 18   Ht 5' (1.524 m)   Wt 214 lb 15.2 oz (97.5 kg)   SpO2 100%   BMI 41.98 kg/m²   General appearance: alert, appears stated age, and cooperative  Head: Normocephalic, without obvious abnormality, atraumatic  Eyes: conjunctivae/corneas clear. PERRL, EOM's intact. Fundi benign. Ears: normal TM's and external ear canals both ears  Nose: Nares normal. Septum midline. Mucosa normal. No drainage or sinus tenderness.   Lungs: clear to auscultation bilaterally  Heart: regular rate and rhythm, S1, S2 normal, no murmur, click, rub or gallop  Abdomen: soft, non-tender; bowel sounds normal; no masses,  no organomegaly  Extremities: extremities normal, atraumatic, no cyanosis or edema    Disposition: SNF    In process/preliminary results:  Outstanding Order Results       Date and Time Order Name Status Description    2/21/2023 10:29 AM Hepatitis Panel, Acute Preliminary             Patient Instructions:   Current Discharge Medication List        CONTINUE these medications which have CHANGED    Details   metoprolol succinate (TOPROL XL) 25 MG extended release tablet Take 1 tablet by mouth daily Hold if SBP is less than 100 or HR less than 55  Qty: 30 tablet, Refills: 3           CONTINUE these medications which have NOT CHANGED    Details   BiPAP Machine MISC by Does not apply route Nightly.  12/6 30% PER NURSING HOME PAPERWORK      glucagon, rDNA, 1 MG injection Inject 1 mg into the muscle as needed for Low blood sugar      Glucose (TRUEPLUS) 15 GM/32ML GEL Take 15 g by mouth as needed      hydrOXYzine pamoate (VISTARIL) 25 MG capsule Take 25 mg by mouth 3 times daily as needed for Itching      acetaminophen (TYLENOL) 325 MG tablet Take 650 mg by mouth every 4 hours as needed for Pain      apixaban (ELIQUIS) 5 MG TABS tablet Take 1 tablet by mouth 2 times daily  Qty: 60 tablet, Refills: 0      midodrine (PROAMATINE) 5 MG tablet Take 1 tablet by mouth 3 times daily (with meals)  Qty: 90 tablet, Refills: 0      sertraline (ZOLOFT) 50 MG tablet Take 50 mg by mouth daily      loperamide (IMODIUM) 2 MG capsule Take 2 mg by mouth 4 times daily as needed for Diarrhea      OXYGEN Inhale 3 L into the lungs continuous      docusate sodium (COLACE, DULCOLAX) 100 MG CAPS Take 100 mg by mouth 2 times daily as needed for Constipation      atorvastatin (LIPITOR) 20 MG tablet Take 20 mg by mouth nightly      ipratropium-albuterol (DUONEB) 0.5-2.5 (3) MG/3ML SOLN nebulizer solution Inhale 1 vial into the lungs 4 times daily      miconazole (MICOTIN) 2 % powder Apply 1 each topically 2 times daily Apply topically under breasts twice daily      pantoprazole (PROTONIX) 40 MG tablet Take 40 mg by mouth every morning      mirtazapine (REMERON) 15 MG tablet Take 7.5 mg by mouth nightly      budesonide-formoterol (SYMBICORT) 160-4.5 MCG/ACT AERO Inhale 2 puffs into the lungs 2 times daily      carbidopa-levodopa (SINEMET CR)  MG per extended release tablet Take 2 tablets by mouth in the morning and 2 tablets at noon and 2 tablets in the evening. rOPINIRole (REQUIP) 1 MG tablet Take 1 tablet by mouth in the morning and 1 tablet at noon and 1 tablet before bedtime. Qty: 90 tablet, Refills: 3      sucralfate (CARAFATE) 1 GM tablet Take 1 tablet by mouth in the morning and 1 tablet at noon and 1 tablet in the evening and 1 tablet before bedtime.   Qty: 120 tablet, Refills: 1      montelukast (SINGULAIR) 10 MG tablet Take 10 mg by mouth nightly           STOP taking these medications       cephALEXin (KEFLEX) 500 MG capsule Comments:   Reason for Stopping:         doxycycline hyclate (VIBRA-TABS) 100 MG tablet Comments:   Reason for Stopping:         bumetanide (BUMEX) 2 MG tablet Comments:   Reason for Stopping:         LORazepam (ATIVAN) 0.5 MG tablet Comments:   Reason for Stopping:         insulin lispro (HUMALOG) 100 UNIT/ML injection vial Comments:   Reason for Stopping:         insulin glargine (LANTUS) 100 UNIT/ML injection vial Comments:   Reason for Stopping:         benzoin compound solution Comments:   Reason for Stopping:         metoprolol tartrate (LOPRESSOR) 25 MG tablet Comments:   Reason for Stopping:         amiodarone (CORDARONE) 200 MG tablet Comments:   Reason for Stopping:         budesonide (PULMICORT) 0.5 MG/2ML nebulizer suspension Comments:   Reason for Stopping:         insulin glargine-yfgn (SEMGLEE-YFGN) 100 UNIT/ML injection vial Comments:   Reason for Stopping:         QUEtiapine (SEROQUEL) 25 MG tablet Comments:   Reason for Stopping:         divalproex (DEPAKOTE) 250 MG DR tablet Comments:   Reason for Stopping:         ferrous sulfate (IRON 325) 325 (65 Fe) MG tablet Comments:   Reason for Stopping:         aspirin EC 81 MG EC tablet Comments:   Reason for Stopping:             Activity: activity as tolerated  Diet: cardiac diet and renal diet  Wound Care:      Follow-up with PCP and Nephrology    Signed:  Shanta Alegriaungwu  2/24/2023  1:35 PM

## 2023-02-24 NOTE — PROGRESS NOTES
Attempted tx with pt, however pt currently OOR at dialysis and is being D/C'd after dialysis per nsg Micheline Pastor). Thank you for the opportunity to participate in this patient's care.  Natalie Moreau, [address]

## 2023-02-24 NOTE — CARE COORDINATION
SS NOTE: COVID (-) 1/17- PT WILL NEED A RAPID NEGATIVE COVID TEST TO RETURN TO [facility] AT [facility]. Arrangements completed for pt to be transferred to [facility] BRAIN Skilled today at 4 PM by [employer]. TERRY GUERRERO has set her out pt hemodialysis chair schedule for NELY ALEXANDER at 5:45AM- she will begin on Monday. For the return to [facility] iZ3D pt will need NO PRECERT, a SIGNED [place]. KRISHNA advised both pt and her dtr Adele Clinton of this information. Adele Clinton continues to request contact from Dr Asaf Oglesby. ESTHER Sullvian.2/24/2023.11:17AM.

## 2023-02-24 NOTE — PROGRESS NOTES
Physical Therapy  Physical Therapy Treatment Note/Plan of Care    Room #:  3245/7515-07  Patient Name: Donell Paredes  YOB: 1950  MRN: [id number]    Date of Service: 2/24/2023     Tentative placement recommendation: Subacute Rehab  Equipment recommendation:  To be determined      Evaluating Physical Therapist: Lulu Boss PT, DPT #[id number]      Specific Provider Orders/Date/Referring Provider :     01/19/23 0745    PT eval and treat  Start:  01/19/23 0745,   End:  01/19/23 0745,   ONE TIME,   Standing Count:  1 Occurrences,   Ileana Meier MD Acknowledge New     Admitting Diagnosis:   NSTEMI (non-ST elevated myocardial infarction) [facility]) [I21.4]  Atrial fibrillation with rapid ventricular response (HCC) [I48.91]  Recurrent right pleural effusion [J90]  Hypotension, unspecified hypotension type [I95.9]  Acute on chronic respiratory failure with hypoxia and hypercapnia (HCC) [J96.21, J96.22]      Surgery: none  Visit Diagnoses         Codes    Recurrent right pleural effusion     J90    NSTEMI (non-ST elevated myocardial infarction) [address]     I21.4    Postprocedural pneumothorax     J95.811            Patient Active Problem List   Diagnosis    Depression with anxiety    Osteoporosis    Asthma    Hyperlipidemia    Mitral regurgitation    Obstructive sleep apnea syndrome    Psoriasis    Diabetes mellitus ([address]    Parkinson's disease ([address]    Primary hypertension    Microalbuminuria    Morbid obesity (HCC)    RLS (restless legs syndrome)    Generalized weakness    Inability to walk    Hypothyroidism    Chest pain    Acute asthma exacerbation    Asthma exacerbation, mild    Paroxysmal atrial fibrillation (HCC)    Atrial fibrillation with rapid ventricular response (HCC)    Acute on chronic congestive heart failure ([address]    Coronary artery disease involving native coronary artery of native heart without angina pectoris    Dysphagia    Hepatosplenomegaly    Acute decompensated heart failure ([facility]    Acute diastolic (congestive) heart failure (HCC)    Nonrheumatic tricuspid valve regurgitation    Tobacco abuse    Recurrent syncope    Septic shock (HCC)    Seizure-like activity (HCC)    Acute kidney injury ([facility]    Pancytopenia (HCC)    Thrombocytopenia (HCC)    Encephalopathy    Acute respiratory failure with hypoxia (HCC)    Lactic acidosis    Delirium    Acute on chronic anemia    Pleural effusion, right    Acute respiratory failure with hypoxia and hypercapnia (HCC)    Acute confusion    Acute on chronic diastolic heart failure (HCC)    Macrocytosis    Other specified anemias    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[facility]    Puerperal sepsis with acute hypoxic respiratory failure without septic shock (HCC)    Pulmonary HTN (HCC)    Chronic anticoagulation    RVF (right ventricular failure) (HCC)    Metabolic alkalosis    Sepsis (HCC)    COPD exacerbation (HCC)    Acute on chronic respiratory failure with hypercapnia (HCC)    Persistent atrial fibrillation (HCC)    Hypercapnic respiratory failure (HCC)    Acute on chronic respiratory failure (HCC)    Hyperkalemia    Heart failure (HCC)    Acute renal insufficiency    Hypotension    Anemia    Acute on chronic respiratory failure with hypoxia and hypercapnia (HCC)    Palliative care encounter        ASSESSMENT of Current Deficits Patient exhibits decreased strength, balance, and endurance impairing functional mobility, transfers, gait , gait distance, and tolerance to activity. Patient initially declined all therapy as she was expected to be transferred to rehab but was ultimately agreeable to supine exercises with encouragement.        PHYSICAL THERAPY  PLAN OF CARE       Physical therapy plan of care is established based on physician order,  patient diagnosis and clinical assessment    Current Treatment Recommendations:    -Bed Mobility: Lower extremity exercises  and Trunk control activities   -Sitting Balance: Incorporate reaching activities to activate trunk muscles , Hands on support to maintain midline , Facilitate active trunk muscle engagement , Facilitate postural control in all planes , and Engage in core activities to allow for movement within base of support   -Standing Balance: Perform strengthening exercises in standing to promote motor control with or without upper extremity support , Instruct patient on adequate base of support to maintain balance, and Challenge balance utilizing reaching  activities beyond center of gravity    -Transfers: Provide instruction on proper hand and foot position for adequate transfer of weight onto lower extremities and use of gait device if needed, Cues for hand placement, technique and safety. Provide stabilization to prevent fall , Facilitate weight shift forward on to lower extremities and provide necessary stabilization of bilateral lower extremities , Support transfer of weight on to lower extremities, and Assist with extension of knees trunk and hip to accept weight transfer   -Gait: Gait training, Standing activities to improve: base of support, weight shift, weight bearing , Exercises to improve trunk control, Exercises to improve hip and knee control, Performance of protected weight bearing activities, and Activities to increase weight bearing   -Endurance: Utilize Supervised activities to increase level of endurance to allow for safe functional mobility including transfers and gait  and Use graduated activities to promote good breathing techniques and provide support and education to maximize respiratory function    PT long term treatment goals are located in below grid    Patient and or family understand(s) diagnosis, prognosis, and plan of care. Frequency of treatments: Patient will be seen  3-5 times/week.          Prior Level of Function: Patient ambulated with wheeled walker for short distances  Rehab Potential: fair + for baseline    Past medical history:   Past Medical History:   Diagnosis Date    A-fib ([facility]     Acute on chronic congestive heart failure (HCC)     Anxiety     Asthma     CAD (coronary artery disease) 01/21/2016    Cancer ([facility]  breast ca 2006    right lumpectomy    Chronic kidney disease     nephrolithiasis    COPD exacerbation ([facility] 10/12/2022    Depression     Diabetes mellitus ([facility]     H/O mammogram     Hx MRSA infection     toe infection january 2012    Hyperlipidemia     Hypertension     Lateral epicondylitis     Morbid obesity (HCC)     SERENA on CPAP     Oxygen dependent     Parkinson's disease ([facility]     Tubal ligation status      Past Surgical History:   Procedure Laterality Date    BREAST LUMPECTOMY      BREAST REDUCTION SURGERY      CARDIAC CATHETERIZATION  4/28/2014    Dr. Mitch Tran  01/11/2022    Dr. Laya Victoria  7/29/15    CT GUIDED CHEST TUBE  7/8/2022    CT GUIDED CHEST TUBE 7/8/2022 Laurie Licona MD [facility]    ENDOSCOPY, COLON, DIAGNOSTIC  7/19/15    GALLBLADDER SURGERY      IR PERC CATH PLEURAL DRAIN W/IMAG  2/14/2023    IR PERC CATH PLEURAL DRAIN W/IMAG 2/14/2023 [facility]    IR REMOVAL OF TUNNELED PLEURAL CATH W CUFF  2/23/2023    IR REMOVAL OF TUNNELED PLEURAL CATH W CUFF 2/23/2023 [facility]    LUMBAR LAMINECTOMY      TOE AMPUTATION      TONSILLECTOMY      UPPER GASTROINTESTINAL ENDOSCOPY      UPPER GASTROINTESTINAL ENDOSCOPY N/A 7/19/2022    EGD ESOPHAGOGASTRODUODENOSCOPY performed by Fox Kelly MD at [address]:    Precautions:  Up with assistance, falls, O2, and morbid obesity      Social history: Patient from SNF    Equipment owned: Wheelchair, Wheeled Walker, and O2    [address] - Inpatient   How much help is needed turning from your back to your side while in a flat bed without using bedrails?: A Lot  How much help is needed moving from lying on your back to sitting on the side of a flat bed without using bedrails?: A Lot  How much help is needed moving to and from a bed to a chair?: Total  How much help is needed standing up from a chair using your arms?: Total  How much help is needed walking in hospital room?: Total  How much help is needed climbing 3-5 steps with a railing?: Total  AM-PAC Inpatient Mobility Raw Score : 8  AM-PAC Inpatient T-Scale Score : 28.52  Mobility Inpatient CMS 0-100% Score: 86.62  Mobility Inpatient CMS G-Code Modifier : CM    Nursing cleared patient for PT treatment. Patient c/o being tired. OBJECTIVE;   Initial Evaluation  Date: 1/19/2023 Treatment Date:  2/24/2023       Short Term/ Long Term   Goals   Was pt agreeable to Eval/treatment? Yes yes To be met in 3 days   Pain level   0/10   0/10    Bed Mobility    Rolling: Maximal assist of 1    Supine to sit: Maximal assist of 1    Sit to supine: Maximal assist of 1    Scooting: Maximal assist of 1   Rolling: Not assessed    Supine to sit: Not assessed    Sit to supine: Not assessed    Scooting: Not assessed     Rolling: Minimal assist of 1    Supine to sit: Minimal assist of 1    Sit to supine: Minimal assist of 1    Scooting: Minimal assist of 1     Transfers Sit to stand: Not assessed  d/t fair- seated balance and pt declining standing Sit to stand: Not assessed       Sit to stand:  Moderate assist of 1    Ambulation    not assessed  not assessed    > 15 feet using  wheeled walker with Moderate assist of 1    Stair negotiation: ascended and descended   Not assessed           ROM Within functional limits    Increase range of motion 10% of affected joints    Strength BUE:  refer to OT eval  RLE:  4-/5  LLE:  4-/5  Increase strength in affected mm groups by 1/3 grade   Balance Sitting EOB:  fair -  Dynamic Standing:  not assessed  Sitting EOB: not assessed   Dynamic Standing: not assessed    Sitting EOB:  fair   Dynamic Standing: fair - with [employer]     Patient is Alert & Oriented x person, place, time, and situation and follows directions    Sensation:  Patient denies numbness/tingling   Edema:  no   Endurance: poor+    Vitals:  2 liters nasal cannula   Blood Pressure at rest  Blood Pressure during session    Heart Rate at rest  Heart Rate during session    SPO2 at rest %  SPO2 during session 96-98%     Patient education  Patient educated on role of Physical Therapy, risks of immobility, safety and plan of care, energy conservation,  importance of mobility while in hospital , ankle pumps, quad set and glut set for edema control, blood clot prevention, safety , and positioning for skin integrity and comfort     Patient response to education:   Pt verbalized understanding Pt demonstrated skill Pt requires further education in this area   Yes Partial Yes      Treatment:  Patient practiced and was instructed/facilitated in the following treatment: Patient performed supine BLE     Therapeutic Exercises:  ankle pumps, heel raises, quad sets, heel slide, hip abduction/adduction, straight leg raise, and SAQ , 15 - 20 reps      At end of session, patient in bed with     call light and phone within reach,  all lines and tubes intact, nursing notified. Patient would benefit from continued skilled Physical Therapy to improve functional independence and quality of life.          Patient's/ family goals   rehab    Time in  1419  Time out  1445    Total Treatment Time  26 minutes      CPT codes:  Therapeutic exercises (49703)   26 minutes  2 unit(s)    Risa Murguia PT License Number [id number]

## 2023-02-27 ENCOUNTER — HOSPITAL ENCOUNTER (OUTPATIENT)
Dept: OTHER | Age: 73
Discharge: HOME | End: 2023-02-27

## 2023-02-27 LAB
HAV IGM SER IA-ACNC: NORMAL
HEPATITIS B CORE IGM ANTIBODY: NORMAL
HEPATITIS B SURFACE ANTIGEN INTERPRETATION: NORMAL
HEPATITIS C ANTIBODY INTERPRETATION: NORMAL

## 2023-03-01 NOTE — PROGRESS NOTES
SPEECH/LANGUAGE PATHOLOGY  CLINICAL ASSESSMENT OF SWALLOWING FUNCTION   and PLAN OF CARE    PATIENT NAME:  Alvarez Galvez  (female)     MRN:  [id number]    :  1950  (66 y.o.)  STATUS:  Inpatient: Room 12/12-01    TODAY'S DATE:  10/6/2022  REFERRING PROVIDER:     SLP swallowing-dysphagia evaluation and treatment  Start:  10/06/22 0715,   End:  10/06/22 0715,   ONE TIME,   Standing Count:  1 Occurrences,   R         Cuca Sofia, APRN - CNP   REASON FOR REFERRAL: dysphagia    EVALUATING THERAPIST: Ella Gunderson, SLP                 RESULTS:    DYSPHAGIA DIAGNOSIS:   Clinical indicators of mild  oropharyngeal phase dysphagia       DIET RECOMMENDATIONS:  Pureed consistency solids (IDDSI level 4) with  nectar consistency (mildly thick - IDDSI level 2) liquids     FEEDING RECOMMENDATIONS:     Assistance level:  Supervision is needed during all oral intake, Encourage self-feeding may need hand over hand assist       Compensatory strategies recommended: Small bites/sips, Alternate solids and liquids, and No straw      Discussed recommendations with nursing and/or faxed report to referring provider: Yes    SPEECH THERAPY  PLAN OF CARE   The dysphagia POC is established based on physician order, dysphagia diagnosis and results of clinical assessment     Skilled SLP intervention for dysphagia management up to 5x per week until goals met, pt plateaus in function and/or discharged from hospital    Conditions Requiring Skilled Therapeutic Intervention for dysphagia:    Patient is performing below functional baseline d/t  current acute condition, Multiple diagnoses, multiple medications, and increased dependency upon caregivers.   Coughing during PO intake    History of dysphagia/altered consistency      Specific dysphagia interventions to include:     Training in positioning for improved integrity of swallow  Compensatory strategy training   Therapeutic exercises    Specific instructions for next treatment:  development and training of compensatory swallow strategies to improve airway protection and swallow function  Patient Treatment Goals:    Short Term Goals:  Pt will implement identified compensatory swallowing strategies on 90% of opportunities or greater to improve airway protection and swallow function. Pt will participate in ongoing mealtime assessment to provide diet modification and compensatory strategy implementation to minimize risk of aspiration associated with PO intake  Pt will complete laryngeal strength/ ROM therapeutic exercises to improve airway protection for the least restrictive PO diet minimal verbal prompts    Long Term Goals:   Pt will improve oropharyngeal swallow function to ensure airway protection during PO intake to maintain adequate nutrition/hydration and decrease signs/symptoms of aspiration to less than 1 x/day. OTHER RECOMMENDATIONS:  A Video Swallow Study (MBSS) is recommended and requires a physician order RECOMMEND THIS BE COMPLETED IN 3-5 DAYS TO ALLOW TIME FOR PATIENT TO RETURN TO BASELINE      Patient/family Goal:    To be able to eat regular foods and drink regular liquids    Plan of care discussed with Patient   The Patient did not demonstrate complete understanding of the diagnosis, prognosis and plan of care     Rehabilitation Potential/Prognosis: good                    ADMITTING DIAGNOSIS: Sepsis ([facility] [A41.9]    VISIT DIAGNOSIS:   Visit Diagnoses         Codes    Septicemia ([facility]    -  Primary A41.9    Urinary tract infection in female     N39.0             PATIENT REPORT/COMPLAINT: coughing with liquids  RN cleared patient for participation in assessment     yes     PRIOR LEVEL OF SWALLOW FUNCTION:    PAST HISTORY OF DYSPHAGIA?: yes    Home diet:   unclear patient reports modified diet of puree and thick at NH but paperwork looks like diet was regular and thin as of 9/30/2022   Current Diet Order:  ADULT DIET;  Full Liquid; Mildly Thick (Nectar)    PROCEDURE:  Consistencies Administered During the Evaluation   Liquids: thin liquid, nectar thick liquid, and honey thick liquid   Solids:  pureed foods      Method of Intake:   cup, spoon  Fed by clinician, Hand over hand assist      Position:   Seated, upright    CLINICAL ASSESSMENT:  Oral Stage: The oral stage of swallowing was within functional limits for consistencies administered      Pharyngeal Stage:    Immediate wet cough was noted after presentation of thin liquid  Latent wet cough was noted after presentation of nectar consistency liquid x1 all other swallows no cough  No other signs of aspiration were noted during this evaluation however, silent aspiration cannot be ruled out at bedside. If silent aspiration is suspected, a Videofluoroscopic Study of Swallowing (MBS) is recommended and requires a physician order. Cognition:   Within functional limits for this exam and Follows 2 - step directions appropriate for this assessment    Oral Peripheral Examination   Generalized oral weakness    Current Respiratory Status    5 liters nasal cannula     Parameters of Speech Production  Respiration:  Adequate for speech production  Quality:   Within functional limits  Intensity: Within functional limits    Volitional Swallow: present     Volitional Cough:   present     Pain: No pain reported. EDUCATION:   The Speech Language Pathologist (SLP) completed education regarding results of evaluation and that intervention is warranted at this time. Learner: Patient  Education: Reviewed results and recommendations of this evaluation  Evaluation of Education:  Needs further instruction    This plan may be re-evaluated and revised as warranted.       Evaluation Time includes thorough review of current medical information, gathering information on past medical history/social history and prior level of function, completion of standardized testing/informal observation of tasks, assessment of data and education on plan of care and goals. [x]The admitting diagnosis and active problem list, have been reviewed prior to initiation of this evaluation. ACTIVE PROBLEM LIST:   Patient Active Problem List   Diagnosis    Depression with anxiety    Osteoporosis    Asthma    Hyperlipidemia    Mitral regurgitation    Obstructive sleep apnea syndrome    Psoriasis    Diabetes mellitus ([address]    Parkinson's disease ([address]    Primary hypertension    Microalbuminuria    Morbid obesity (HCC)    RLS (restless legs syndrome)    Generalized weakness    Inability to walk    Hypothyroidism    Chest pain    Acute asthma exacerbation    Asthma exacerbation, mild    Paroxysmal atrial fibrillation (HCC)    Atrial fibrillation with RVR (HCC)    Acute on chronic congestive heart failure ([address]    Coronary artery disease involving native coronary artery of native heart without angina pectoris    Dysphagia    Hepatosplenomegaly    Acute decompensated heart failure (HCC)    Acute diastolic (congestive) heart failure (HCC)    Nonrheumatic tricuspid valve regurgitation    Tobacco abuse    Recurrent syncope    Septic shock (HCC)    Seizure-like activity (HCC)    SHANTANU (acute kidney injury) ([address]    Pancytopenia (HCC)    Thrombocytopenia (HCC)    Encephalopathy    Acute respiratory failure with hypoxia (HCC)    Lactic acidosis    Delirium    Acute on chronic anemia    Pleural effusion, right    Acute respiratory failure with hypoxia and hypercapnia (HCC)    Acute confusion    Chronic diastolic (congestive) heart failure (HCC)    Macrocytosis    Other specified anemias    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[address]    Puerperal sepsis with acute hypoxic respiratory failure without septic shock (HCC)    Pulmonary HTN (HCC)    Chronic anticoagulation    RVF (right ventricular failure) ([address]    Metabolic alkalosis    Sepsis ([address]         CPT code:  17626  bedside swallow eval      Patient seen for swallow therapy 10 minutes.   Reviewed current solid/liquid consistency diet recommendation for   Pureed consistency solids (IDDSI level 4) with nectar consistency (mildly thick - IDDSI level 2) liquidsand discussed compensatory strategies to ensure safe PO intake. Reviewed aspiration precautions. Discussed use of Small bites/sips and No straw to decrease risk of aspiration with implementation of strengthening exercises to improve swallow function as the long term goal.  Patient was nota able to restate compensatory strategies during session. .  Patient will require ongoing education regarding dysphagia secondary to decreased ability to fully demonstrate and or restate compensatory strategies and or diet modifications during session. .        [id number]  dysphagia tx    Nadia Chatman MSCCC/SLP  Speech Language Pathologist  [id number] Area L Indication Text: Tumors in this location are included in Area L (trunk and extremities).  Mohs surgery is indicated for larger tumors, or tumors with aggressive histologic features, in these anatomic locations.

## 2023-03-09 ENCOUNTER — HOSPITAL ENCOUNTER (INPATIENT)
Age: 73
LOS: 6 days | Discharge: NURSING, REHAB OR LONG-TERM CARE | End: 2023-03-16
Attending: EMERGENCY MEDICINE | Admitting: INTERNAL MEDICINE
Payer: MEDICARE

## 2023-03-09 DIAGNOSIS — J96.02 ACUTE RESPIRATORY FAILURE WITH HYPERCAPNIA (HCC): ICD-10-CM

## 2023-03-09 DIAGNOSIS — J95.811 POSTPROCEDURAL PNEUMOTHORAX: ICD-10-CM

## 2023-03-09 DIAGNOSIS — I95.9 HYPOTENSION, UNSPECIFIED HYPOTENSION TYPE: ICD-10-CM

## 2023-03-09 DIAGNOSIS — I50.9 ACUTE ON CHRONIC CONGESTIVE HEART FAILURE, UNSPECIFIED HEART FAILURE TYPE (HCC): ICD-10-CM

## 2023-03-09 DIAGNOSIS — R77.8 ELEVATED TROPONIN: ICD-10-CM

## 2023-03-09 DIAGNOSIS — N18.4 CHRONIC RENAL FAILURE, STAGE 4 (SEVERE) (HCC): ICD-10-CM

## 2023-03-09 DIAGNOSIS — J96.22 ACUTE ON CHRONIC RESPIRATORY FAILURE WITH HYPERCAPNIA (HCC): ICD-10-CM

## 2023-03-09 DIAGNOSIS — E03.5 MYXEDEMA COMA (HCC): Primary | ICD-10-CM

## 2023-03-09 PROCEDURE — 36556 INSERT NON-TUNNEL CV CATH: CPT

## 2023-03-09 PROCEDURE — 87635 SARS-COV-2 COVID-19 AMP PRB: CPT

## 2023-03-09 PROCEDURE — 99285 EMERGENCY DEPT VISIT HI MDM: CPT

## 2023-03-09 ASSESSMENT — ENCOUNTER SYMPTOMS
CHEST TIGHTNESS: 0
BACK PAIN: 0
DIARRHEA: 0
SHORTNESS OF BREATH: 0
NAUSEA: 0
ABDOMINAL PAIN: 0
WHEEZING: 0
COUGH: 0
SORE THROAT: 0
VOMITING: 0

## 2023-03-09 ASSESSMENT — PAIN - FUNCTIONAL ASSESSMENT: PAIN_FUNCTIONAL_ASSESSMENT: NONE - DENIES PAIN

## 2023-03-09 NOTE — LETTER
[place] Department Medicaid  CERTIFICATION OF NECESSITY  FOR NON-EMERGENCY TRANSPORTATION   BY GROUND AMBULANCE      Individual Information   1. Name: Brandin Coe 2. [place] Medicaid Billing Number: ***   ADDRESS : [address] [place] [address]     Transportation Provider Information   4. Provider Name: [facility]    5. [place] Medicaid Provider Number: *** National Provider Identifier (NPI): ***     Certification  7. Criteria:  During transport, this individual requires:  [x] Medical treatment or continuous     supervision by an EMT. [x] The administration or regulation of oxygen by another person. [x] Supervised protective restraint. 8. Period Beginning Date: 3/16/2023   9. Length  [x] Not more than 1 day(s)  [] One Year     Additional Information Relevant to Certification   10. Comments or Explanations, If Necessary or Appropriate     High falls risk, Myxedema coma, (per PT note- Patient may be unsafe for ambulette due to impaired seated balance, sitting endurance)     Certifying Practitioner Information   11. Name of Practitioner: Dr. Siri Carr    12. [place] Medicaid Provider Number, If Applicable: *** 13. National Provider Identifier (NPI): ***     Signature Information   14. Date of Signature: 3/16/2023 15. Name of Person Signing: Heaven Hernandez RN   35.  Signature and Professional Designation: Electronically signed by Heaven Hernandez RN on 3/16/2023 at 10:19 AM       [facility] [id number]  Rev. 7/2015***

## 2023-03-10 ENCOUNTER — APPOINTMENT (OUTPATIENT)
Dept: GENERAL RADIOLOGY | Age: 73
End: 2023-03-10
Payer: MEDICARE

## 2023-03-10 ENCOUNTER — APPOINTMENT (OUTPATIENT)
Dept: INTERVENTIONAL RADIOLOGY/VASCULAR | Age: 73
End: 2023-03-10
Payer: MEDICARE

## 2023-03-10 ENCOUNTER — HOSPITAL ENCOUNTER (OUTPATIENT)
Dept: OTHER | Age: 73
Setting detail: THERAPIES SERIES
Discharge: HOME | End: 2023-03-10

## 2023-03-10 ENCOUNTER — APPOINTMENT (OUTPATIENT)
Dept: CT IMAGING | Age: 73
End: 2023-03-10
Payer: MEDICARE

## 2023-03-10 PROBLEM — E03.5 MYXEDEMA COMA (HCC): Status: ACTIVE | Noted: 2023-03-10

## 2023-03-10 LAB
ALBUMIN SERPL-MCNC: 3.8 G/DL (ref 3.5–5.2)
ALP BLD-CCNC: 81 U/L (ref 35–104)
ALT SERPL-CCNC: <5 U/L (ref 0–32)
ANION GAP SERPL CALCULATED.3IONS-SCNC: 8 MMOL/L (ref 7–16)
ANION GAP SERPL CALCULATED.3IONS-SCNC: 8 MMOL/L (ref 7–16)
ANISOCYTOSIS: ABNORMAL
ANISOCYTOSIS: ABNORMAL
APPEARANCE FLUID: CLEAR
APTT: 47 SEC (ref 24.5–35.1)
AST SERPL-CCNC: 6 U/L (ref 0–31)
B.E.: 1.1 MMOL/L (ref -3–3)
B.E.: 1.3 MMOL/L (ref -3–3)
B.E.: 2.1 MMOL/L (ref -3–3)
BACTERIA: ABNORMAL /HPF
BASOPHILIC STIPPLING: ABNORMAL
BASOPHILIC STIPPLING: ABNORMAL
BASOPHILS ABSOLUTE: 0 E9/L (ref 0–0.2)
BASOPHILS ABSOLUTE: 0.16 E9/L (ref 0–0.2)
BASOPHILS RELATIVE PERCENT: 0.7 % (ref 0–2)
BASOPHILS RELATIVE PERCENT: 1.8 % (ref 0–2)
BETA-HYDROXYBUTYRATE: 0.12 MMOL/L (ref 0.02–0.27)
BILIRUB SERPL-MCNC: 0.5 MG/DL (ref 0–1.2)
BILIRUBIN URINE: NEGATIVE
BLOOD, URINE: ABNORMAL
BUN BLDV-MCNC: 16 MG/DL (ref 6–23)
BUN BLDV-MCNC: 17 MG/DL (ref 6–23)
CALCIUM SERPL-MCNC: 8.7 MG/DL (ref 8.6–10.2)
CALCIUM SERPL-MCNC: 9.3 MG/DL (ref 8.6–10.2)
CELL COUNT FLUID TYPE: NORMAL
CHLORIDE BLD-SCNC: 98 MMOL/L (ref 98–107)
CHLORIDE BLD-SCNC: 99 MMOL/L (ref 98–107)
CLARITY: ABNORMAL
CO2: 31 MMOL/L (ref 22–29)
CO2: 32 MMOL/L (ref 22–29)
COLOR FLUID: NORMAL
COLOR: YELLOW
CORTISOL TOTAL: 170.9 MCG/DL (ref 2.68–18.4)
CREAT SERPL-MCNC: 2.8 MG/DL (ref 0.5–1)
CREAT SERPL-MCNC: 2.9 MG/DL (ref 0.5–1)
CRITICAL NOTIFICATION: YES
CRITICAL NOTIFICATION: YES
CRITICAL: ABNORMAL
DATE ANALYZED: ABNORMAL
DATE OF COLLECTION: ABNORMAL
DELIVERY SYSTEMS: ABNORMAL
DEVICE: ABNORMAL
EKG ATRIAL RATE: 42 BPM
EKG ATRIAL RATE: 71 BPM
EKG P AXIS: 19 DEGREES
EKG P AXIS: 63 DEGREES
EKG P-R INTERVAL: 184 MS
EKG P-R INTERVAL: 218 MS
EKG Q-T INTERVAL: 408 MS
EKG Q-T INTERVAL: 478 MS
EKG QRS DURATION: 88 MS
EKG QRS DURATION: 90 MS
EKG QTC CALCULATION (BAZETT): 394 MS
EKG QTC CALCULATION (BAZETT): 443 MS
EKG R AXIS: 107 DEGREES
EKG R AXIS: 111 DEGREES
EKG T AXIS: -16 DEGREES
EKG T AXIS: -73 DEGREES
EKG VENTRICULAR RATE: 41 BPM
EKG VENTRICULAR RATE: 71 BPM
EOSINOPHILS ABSOLUTE: 0.08 E9/L (ref 0.05–0.5)
EOSINOPHILS ABSOLUTE: 0.1 E9/L (ref 0.05–0.5)
EOSINOPHILS RELATIVE PERCENT: 0.9 % (ref 0–6)
EOSINOPHILS RELATIVE PERCENT: 0.9 % (ref 0–6)
EPITHELIAL CELLS, UA: ABNORMAL /HPF
FIO2: 60
FIO2: 80
FLUID TYPE: NORMAL
GFR SERPL CREATININE-BSD FRML MDRD: 17 ML/MIN/1.73
GFR SERPL CREATININE-BSD FRML MDRD: 17 ML/MIN/1.73
GLUCOSE BLD-MCNC: 141 MG/DL (ref 74–99)
GLUCOSE BLD-MCNC: 85 MG/DL (ref 74–99)
GLUCOSE URINE: NEGATIVE MG/DL
GLUCOSE, FLUID: 155 MG/DL
HCO3: 26.8 MMOL/L (ref 22–26)
HCO3: 32.1 MMOL/L (ref 22–26)
HCO3: 33 MMOL/L (ref 22–26)
HCT VFR BLD CALC: 35.5 % (ref 34–48)
HCT VFR BLD CALC: 40.1 % (ref 34–48)
HEMOGLOBIN: 10.1 G/DL (ref 11.5–15.5)
HEMOGLOBIN: 8.9 G/DL (ref 11.5–15.5)
HYALINE CASTS: ABNORMAL /LPF (ref 0–2)
HYPOCHROMIA: ABNORMAL
INR BLD: 2.5
KETONES, URINE: ABNORMAL MG/DL
LAB: ABNORMAL
LACTIC ACID, SEPSIS: 1 MMOL/L (ref 0.5–1.9)
LACTIC ACID, SEPSIS: 1.4 MMOL/L (ref 0.5–1.9)
LD, FLUID: 77 U/L
LEUKOCYTE ESTERASE, URINE: ABNORMAL
LIPASE: 17 U/L (ref 13–60)
LYMPHOCYTES ABSOLUTE: 0.68 E9/L (ref 1.5–4)
LYMPHOCYTES ABSOLUTE: 1.22 E9/L (ref 1.5–4)
LYMPHOCYTES RELATIVE PERCENT: 14 % (ref 20–42)
LYMPHOCYTES RELATIVE PERCENT: 6.1 % (ref 20–42)
Lab: ABNORMAL
Lab: ABNORMAL
MAGNESIUM: 2 MG/DL (ref 1.6–2.6)
MAGNESIUM: 2 MG/DL (ref 1.6–2.6)
MCH RBC QN AUTO: 24.5 PG (ref 26–35)
MCH RBC QN AUTO: 24.7 PG (ref 26–35)
MCHC RBC AUTO-ENTMCNC: 25.1 % (ref 32–34.5)
MCHC RBC AUTO-ENTMCNC: 25.2 % (ref 32–34.5)
MCV RBC AUTO: 97.3 FL (ref 80–99.9)
MCV RBC AUTO: 98.3 FL (ref 80–99.9)
METAMYELOCYTES RELATIVE PERCENT: 0.9 % (ref 0–1)
METER GLUCOSE: 85 MG/DL (ref 74–99)
MODE: AC
MONOCYTE, FLUID: 80 %
MONOCYTES ABSOLUTE: 0.34 E9/L (ref 0.1–0.95)
MONOCYTES ABSOLUTE: 0.35 E9/L (ref 0.1–0.95)
MONOCYTES RELATIVE PERCENT: 2.6 % (ref 2–12)
MONOCYTES RELATIVE PERCENT: 3.5 % (ref 2–12)
MYELOCYTE PERCENT: 1.8 % (ref 0–0)
NEUTROPHIL, FLUID: 20 %
NEUTROPHILS ABSOLUTE: 10.37 E9/L (ref 1.8–7.3)
NEUTROPHILS ABSOLUTE: 6.96 E9/L (ref 1.8–7.3)
NEUTROPHILS RELATIVE PERCENT: 78.1 % (ref 43–80)
NEUTROPHILS RELATIVE PERCENT: 89.6 % (ref 43–80)
NITRITE, URINE: POSITIVE
NUCLEATED CELLS FLUID: 49 /UL
NUCLEATED RED BLOOD CELLS: 0.9 /100 WBC
O2 SATURATION: 88.7 % (ref 92–98.5)
O2 SATURATION: 90.6 % (ref 92–98.5)
O2 SATURATION: 99.4 % (ref 92–98.5)
OPERATOR ID: 2323
OPERATOR ID: 30
OPERATOR ID: 7292
OPERATOR ID: 789
OVALOCYTES: ABNORMAL
PATIENT TEMP: 37
PATIENT TEMP: 37
PCO2 (TEMP CORRECTED): 94.3 MMHG (ref 35–45)
PCO2 (TEMP CORRECTED): 94.6 MMHG (ref 35–45)
PCO2 37: 41.1 MMHG (ref 35–45)
PDW BLD-RTO: 20.8 FL (ref 11.5–15)
PDW BLD-RTO: 20.8 FL (ref 11.5–15)
PH (TEMPERATURE CORRECTED): 7.14 (ref 7.35–7.45)
PH (TEMPERATURE CORRECTED): 7.15 (ref 7.35–7.45)
PH 37: 7.42 (ref 7.35–7.45)
PH FLUID: ABNORMAL
PH UA: 6.5 (ref 5–9)
PH VENOUS: 7.18 (ref 7.35–7.45)
PHOSPHORUS: 2.8 MG/DL (ref 2.5–4.5)
PLATELET # BLD: 247 E9/L (ref 130–450)
PLATELET # BLD: 251 E9/L (ref 130–450)
PMV BLD AUTO: 10.5 FL (ref 7–12)
PMV BLD AUTO: 11 FL (ref 7–12)
PO2 (TEMP CORRECTED): 75.6 MMHG (ref 60–80)
PO2 (TEMP CORRECTED): 81.9 MMHG (ref 60–80)
PO2 37: 156.7 MMHG (ref 60–80)
POC SOURCE: ABNORMAL
POIKILOCYTES: ABNORMAL
POIKILOCYTES: ABNORMAL
POLYCHROMASIA: ABNORMAL
POLYCHROMASIA: ABNORMAL
POSITIVE END EXP PRESS: 5 CMH2O
POSITIVE END EXP PRESS: 8 CMH2O
POTASSIUM SERPL-SCNC: 4 MMOL/L (ref 3.5–5)
POTASSIUM SERPL-SCNC: 4.2 MMOL/L (ref 3.5–5)
PRESSURE SUPPORT: 18 CMH2O
PRO-BNP: ABNORMAL PG/ML (ref 0–125)
PROCALCITONIN: 0.4 NG/ML (ref 0–0.08)
PROTEIN FLUID: 3 G/DL
PROTEIN UA: >=300 MG/DL
PROTHROMBIN TIME: 29.2 SEC (ref 9.3–12.4)
RBC # BLD: 3.61 E12/L (ref 3.5–5.5)
RBC # BLD: 4.12 E12/L (ref 3.5–5.5)
RBC FLUID: <2000 /UL
RBC UA: ABNORMAL /HPF (ref 0–2)
RESPIRATORY RATE: 22 B/MIN
SARS-COV-2, NAAT: NOT DETECTED
SODIUM BLD-SCNC: 138 MMOL/L (ref 132–146)
SODIUM BLD-SCNC: 138 MMOL/L (ref 132–146)
SOURCE, BLOOD GAS: ABNORMAL
SPECIFIC GRAVITY UA: 1.02 (ref 1–1.03)
STOMATOCYTES: ABNORMAL
T4 TOTAL: 1.2 MCG/DL (ref 4.5–11.7)
TIDAL VOLUME: 350 ML
TIME ANALYZED: 1627
TOTAL PROTEIN: 6 G/DL (ref 6.4–8.3)
TROPONIN, HIGH SENSITIVITY: 169 NG/L (ref 0–9)
TSH SERPL DL<=0.05 MIU/L-ACNC: 115.3 UIU/ML (ref 0.27–4.2)
TSH SERPL DL<=0.05 MIU/L-ACNC: 95.93 UIU/ML (ref 0.27–4.2)
UROBILINOGEN, URINE: 0.2 E.U./DL
VACUOLATED NEUTROPHILS: ABNORMAL
WBC # BLD: 11.4 E9/L (ref 4.5–11.5)
WBC # BLD: 8.7 E9/L (ref 4.5–11.5)
WBC UA: >20 /HPF (ref 0–5)

## 2023-03-10 PROCEDURE — 83986 ASSAY PH BODY FLUID NOS: CPT

## 2023-03-10 PROCEDURE — 6370000000 HC RX 637 (ALT 250 FOR IP): Performed by: INTERNAL MEDICINE

## 2023-03-10 PROCEDURE — 2580000003 HC RX 258: Performed by: INTERNAL MEDICINE

## 2023-03-10 PROCEDURE — 80053 COMPREHEN METABOLIC PANEL: CPT

## 2023-03-10 PROCEDURE — 87186 SC STD MICRODIL/AGAR DIL: CPT

## 2023-03-10 PROCEDURE — 88305 TISSUE EXAM BY PATHOLOGIST: CPT

## 2023-03-10 PROCEDURE — 82947 ASSAY GLUCOSE BLOOD QUANT: CPT

## 2023-03-10 PROCEDURE — 87081 CULTURE SCREEN ONLY: CPT

## 2023-03-10 PROCEDURE — 85730 THROMBOPLASTIN TIME PARTIAL: CPT

## 2023-03-10 PROCEDURE — 81001 URINALYSIS AUTO W/SCOPE: CPT

## 2023-03-10 PROCEDURE — 6360000002 HC RX W HCPCS

## 2023-03-10 PROCEDURE — 94002 VENT MGMT INPAT INIT DAY: CPT

## 2023-03-10 PROCEDURE — 82010 KETONE BODYS QUAN: CPT

## 2023-03-10 PROCEDURE — 82803 BLOOD GASES ANY COMBINATION: CPT

## 2023-03-10 PROCEDURE — 96365 THER/PROPH/DIAG IV INF INIT: CPT

## 2023-03-10 PROCEDURE — 71045 X-RAY EXAM CHEST 1 VIEW: CPT

## 2023-03-10 PROCEDURE — 87206 SMEAR FLUORESCENT/ACID STAI: CPT

## 2023-03-10 PROCEDURE — 71250 CT THORAX DX C-: CPT

## 2023-03-10 PROCEDURE — 74018 RADEX ABDOMEN 1 VIEW: CPT

## 2023-03-10 PROCEDURE — 82533 TOTAL CORTISOL: CPT

## 2023-03-10 PROCEDURE — 2500000003 HC RX 250 WO HCPCS: Performed by: EMERGENCY MEDICINE

## 2023-03-10 PROCEDURE — C1729 CATH, DRAINAGE: HCPCS

## 2023-03-10 PROCEDURE — 2580000003 HC RX 258: Performed by: EMERGENCY MEDICINE

## 2023-03-10 PROCEDURE — 83735 ASSAY OF MAGNESIUM: CPT

## 2023-03-10 PROCEDURE — 94640 AIRWAY INHALATION TREATMENT: CPT

## 2023-03-10 PROCEDURE — 93005 ELECTROCARDIOGRAM TRACING: CPT | Performed by: EMERGENCY MEDICINE

## 2023-03-10 PROCEDURE — 96375 TX/PRO/DX INJ NEW DRUG ADDON: CPT

## 2023-03-10 PROCEDURE — 32555 ASPIRATE PLEURA W/ IMAGING: CPT

## 2023-03-10 PROCEDURE — 93010 ELECTROCARDIOGRAM REPORT: CPT | Performed by: INTERNAL MEDICINE

## 2023-03-10 PROCEDURE — 83615 LACTATE (LD) (LDH) ENZYME: CPT

## 2023-03-10 PROCEDURE — 90935 HEMODIALYSIS ONE EVALUATION: CPT

## 2023-03-10 PROCEDURE — 36592 COLLECT BLOOD FROM PICC: CPT

## 2023-03-10 PROCEDURE — 31500 INSERT EMERGENCY AIRWAY: CPT

## 2023-03-10 PROCEDURE — 2580000003 HC RX 258

## 2023-03-10 PROCEDURE — 87077 CULTURE AEROBIC IDENTIFY: CPT

## 2023-03-10 PROCEDURE — 94664 DEMO&/EVAL PT USE INHALER: CPT

## 2023-03-10 PROCEDURE — 94660 CPAP INITIATION&MGMT: CPT

## 2023-03-10 PROCEDURE — 87205 SMEAR GRAM STAIN: CPT

## 2023-03-10 PROCEDURE — 89051 BODY FLUID CELL COUNT: CPT

## 2023-03-10 PROCEDURE — 84484 ASSAY OF TROPONIN QUANT: CPT

## 2023-03-10 PROCEDURE — 84157 ASSAY OF PROTEIN OTHER: CPT

## 2023-03-10 PROCEDURE — 6360000002 HC RX W HCPCS: Performed by: EMERGENCY MEDICINE

## 2023-03-10 PROCEDURE — 82962 GLUCOSE BLOOD TEST: CPT

## 2023-03-10 PROCEDURE — 85610 PROTHROMBIN TIME: CPT

## 2023-03-10 PROCEDURE — 2500000003 HC RX 250 WO HCPCS

## 2023-03-10 PROCEDURE — 5A1D70Z PERFORMANCE OF URINARY FILTRATION, INTERMITTENT, LESS THAN 6 HOURS PER DAY: ICD-10-PCS | Performed by: INTERNAL MEDICINE

## 2023-03-10 PROCEDURE — 0BH17EZ INSERTION OF ENDOTRACHEAL AIRWAY INTO TRACHEA, VIA NATURAL OR ARTIFICIAL OPENING: ICD-10-PCS | Performed by: INTERNAL MEDICINE

## 2023-03-10 PROCEDURE — 5A1945Z RESPIRATORY VENTILATION, 24-96 CONSECUTIVE HOURS: ICD-10-PCS | Performed by: INTERNAL MEDICINE

## 2023-03-10 PROCEDURE — 84436 ASSAY OF TOTAL THYROXINE: CPT

## 2023-03-10 PROCEDURE — 87088 URINE BACTERIA CULTURE: CPT

## 2023-03-10 PROCEDURE — 2000000000 HC ICU R&B

## 2023-03-10 PROCEDURE — 84145 PROCALCITONIN (PCT): CPT

## 2023-03-10 PROCEDURE — 84100 ASSAY OF PHOSPHORUS: CPT

## 2023-03-10 PROCEDURE — 87040 BLOOD CULTURE FOR BACTERIA: CPT

## 2023-03-10 PROCEDURE — 83880 ASSAY OF NATRIURETIC PEPTIDE: CPT

## 2023-03-10 PROCEDURE — 82800 BLOOD PH: CPT

## 2023-03-10 PROCEDURE — 83690 ASSAY OF LIPASE: CPT

## 2023-03-10 PROCEDURE — 88112 CYTOPATH CELL ENHANCE TECH: CPT

## 2023-03-10 PROCEDURE — 84443 ASSAY THYROID STIM HORMONE: CPT

## 2023-03-10 PROCEDURE — 83605 ASSAY OF LACTIC ACID: CPT

## 2023-03-10 PROCEDURE — 85025 COMPLETE CBC W/AUTO DIFF WBC: CPT

## 2023-03-10 PROCEDURE — 80048 BASIC METABOLIC PNL TOTAL CA: CPT

## 2023-03-10 PROCEDURE — 99223 1ST HOSP IP/OBS HIGH 75: CPT | Performed by: INTERNAL MEDICINE

## 2023-03-10 PROCEDURE — 36556 INSERT NON-TUNNEL CV CATH: CPT

## 2023-03-10 PROCEDURE — 0W993ZZ DRAINAGE OF RIGHT PLEURAL CAVITY, PERCUTANEOUS APPROACH: ICD-10-PCS | Performed by: RADIOLOGY

## 2023-03-10 PROCEDURE — 6360000002 HC RX W HCPCS: Performed by: INTERNAL MEDICINE

## 2023-03-10 PROCEDURE — 87070 CULTURE OTHR SPECIMN AEROBIC: CPT

## 2023-03-10 RX ORDER — ACETAMINOPHEN 650 MG/1
650 SUPPOSITORY RECTAL EVERY 6 HOURS PRN
Status: DISCONTINUED | OUTPATIENT
Start: 2023-03-10 | End: 2023-03-16 | Stop reason: HOSPADM

## 2023-03-10 RX ORDER — PROPOFOL 10 MG/ML
5-50 INJECTION, EMULSION INTRAVENOUS
Status: DISCONTINUED | OUTPATIENT
Start: 2023-03-10 | End: 2023-03-11

## 2023-03-10 RX ORDER — PROMETHAZINE HYDROCHLORIDE 25 MG/1
12.5 TABLET ORAL EVERY 6 HOURS PRN
Status: DISCONTINUED | OUTPATIENT
Start: 2023-03-10 | End: 2023-03-16 | Stop reason: HOSPADM

## 2023-03-10 RX ORDER — HEPARIN SODIUM 5000 [USP'U]/ML
5000 INJECTION, SOLUTION INTRAVENOUS; SUBCUTANEOUS EVERY 8 HOURS SCHEDULED
Status: DISCONTINUED | OUTPATIENT
Start: 2023-03-10 | End: 2023-03-11

## 2023-03-10 RX ORDER — MIDAZOLAM HYDROCHLORIDE 1 MG/ML
4 INJECTION INTRAMUSCULAR; INTRAVENOUS ONCE
Status: COMPLETED | OUTPATIENT
Start: 2023-03-10 | End: 2023-03-10

## 2023-03-10 RX ORDER — BUDESONIDE 0.5 MG/2ML
0.5 INHALANT ORAL 2 TIMES DAILY
Status: DISCONTINUED | OUTPATIENT
Start: 2023-03-10 | End: 2023-03-16 | Stop reason: HOSPADM

## 2023-03-10 RX ORDER — DOPAMINE HYDROCHLORIDE 320 MG/100ML
1-20 INJECTION, SOLUTION INTRAVENOUS CONTINUOUS
Status: DISCONTINUED | OUTPATIENT
Start: 2023-03-10 | End: 2023-03-16 | Stop reason: HOSPADM

## 2023-03-10 RX ORDER — SODIUM CHLORIDE 0.9 % (FLUSH) 0.9 %
10 SYRINGE (ML) INJECTION EVERY 12 HOURS SCHEDULED
Status: DISCONTINUED | OUTPATIENT
Start: 2023-03-10 | End: 2023-03-16 | Stop reason: HOSPADM

## 2023-03-10 RX ORDER — ATROPINE SULFATE 0.1 MG/ML
1 INJECTION INTRAVENOUS ONCE
Status: COMPLETED | OUTPATIENT
Start: 2023-03-10 | End: 2023-03-10

## 2023-03-10 RX ORDER — DOPAMINE HYDROCHLORIDE 320 MG/100ML
INJECTION, SOLUTION INTRAVENOUS
Status: COMPLETED
Start: 2023-03-10 | End: 2023-03-10

## 2023-03-10 RX ORDER — LEVOTHYROXINE SODIUM ANHYDROUS 100 UG/5ML
150 INJECTION, POWDER, LYOPHILIZED, FOR SOLUTION INTRAVENOUS ONCE
Status: COMPLETED | OUTPATIENT
Start: 2023-03-10 | End: 2023-03-10

## 2023-03-10 RX ORDER — CALCIUM GLUCONATE 20 MG/ML
1000 INJECTION, SOLUTION INTRAVENOUS ONCE
Status: COMPLETED | OUTPATIENT
Start: 2023-03-10 | End: 2023-03-10

## 2023-03-10 RX ORDER — ALBUTEROL SULFATE 2.5 MG/3ML
2.5 SOLUTION RESPIRATORY (INHALATION)
Status: DISCONTINUED | OUTPATIENT
Start: 2023-03-10 | End: 2023-03-16 | Stop reason: HOSPADM

## 2023-03-10 RX ORDER — LINEZOLID 600 MG/1
600 TABLET, FILM COATED ORAL EVERY 12 HOURS SCHEDULED
Status: DISCONTINUED | OUTPATIENT
Start: 2023-03-10 | End: 2023-03-10

## 2023-03-10 RX ORDER — FENTANYL CITRATE-0.9 % NACL/PF 20 MCG/2ML
50 SYRINGE (ML) INTRAVENOUS EVERY 30 MIN PRN
Status: DISCONTINUED | OUTPATIENT
Start: 2023-03-10 | End: 2023-03-11

## 2023-03-10 RX ORDER — SODIUM CHLORIDE 9 MG/ML
INJECTION, SOLUTION INTRAVENOUS PRN
Status: DISCONTINUED | OUTPATIENT
Start: 2023-03-10 | End: 2023-03-16 | Stop reason: HOSPADM

## 2023-03-10 RX ORDER — ACETAMINOPHEN 325 MG/1
650 TABLET ORAL EVERY 6 HOURS PRN
Status: DISCONTINUED | OUTPATIENT
Start: 2023-03-10 | End: 2023-03-16 | Stop reason: HOSPADM

## 2023-03-10 RX ORDER — MIDODRINE HYDROCHLORIDE 5 MG/1
10 TABLET ORAL
Status: DISCONTINUED | OUTPATIENT
Start: 2023-03-13 | End: 2023-03-14

## 2023-03-10 RX ORDER — SODIUM CHLORIDE 0.9 % (FLUSH) 0.9 %
10 SYRINGE (ML) INJECTION PRN
Status: DISCONTINUED | OUTPATIENT
Start: 2023-03-10 | End: 2023-03-16 | Stop reason: HOSPADM

## 2023-03-10 RX ORDER — LEVOTHYROXINE SODIUM ANHYDROUS 100 UG/5ML
100 INJECTION, POWDER, LYOPHILIZED, FOR SOLUTION INTRAVENOUS DAILY
Status: DISCONTINUED | OUTPATIENT
Start: 2023-03-10 | End: 2023-03-13

## 2023-03-10 RX ORDER — LINEZOLID 600 MG/1
600 TABLET, FILM COATED ORAL EVERY 12 HOURS SCHEDULED
Status: DISCONTINUED | OUTPATIENT
Start: 2023-03-10 | End: 2023-03-13

## 2023-03-10 RX ORDER — ETOMIDATE 2 MG/ML
20 INJECTION INTRAVENOUS ONCE
Status: DISCONTINUED | OUTPATIENT
Start: 2023-03-10 | End: 2023-03-11

## 2023-03-10 RX ORDER — ONDANSETRON 2 MG/ML
4 INJECTION INTRAMUSCULAR; INTRAVENOUS EVERY 6 HOURS PRN
Status: DISCONTINUED | OUTPATIENT
Start: 2023-03-10 | End: 2023-03-16 | Stop reason: HOSPADM

## 2023-03-10 RX ORDER — POLYETHYLENE GLYCOL 3350 17 G/17G
17 POWDER, FOR SOLUTION ORAL DAILY PRN
Status: DISCONTINUED | OUTPATIENT
Start: 2023-03-10 | End: 2023-03-16 | Stop reason: HOSPADM

## 2023-03-10 RX ORDER — DOPAMINE HYDROCHLORIDE 320 MG/100ML
1-20 INJECTION, SOLUTION INTRAVENOUS ONCE
Status: COMPLETED | OUTPATIENT
Start: 2023-03-10 | End: 2023-03-10

## 2023-03-10 RX ORDER — IPRATROPIUM BROMIDE AND ALBUTEROL SULFATE 2.5; .5 MG/3ML; MG/3ML
1 SOLUTION RESPIRATORY (INHALATION)
Status: DISCONTINUED | OUTPATIENT
Start: 2023-03-10 | End: 2023-03-10 | Stop reason: CLARIF

## 2023-03-10 RX ORDER — ATROPINE SULFATE 0.1 MG/ML
INJECTION INTRAVENOUS
Status: COMPLETED
Start: 2023-03-10 | End: 2023-03-10

## 2023-03-10 RX ORDER — LEVOTHYROXINE SODIUM ANHYDROUS 100 UG/5ML
50 INJECTION, POWDER, LYOPHILIZED, FOR SOLUTION INTRAVENOUS ONCE
Status: COMPLETED | OUTPATIENT
Start: 2023-03-10 | End: 2023-03-10

## 2023-03-10 RX ADMIN — HYDROCORTISONE SODIUM SUCCINATE 100 MG: 100 INJECTION, POWDER, FOR SOLUTION INTRAMUSCULAR; INTRAVENOUS at 17:11

## 2023-03-10 RX ADMIN — ALBUTEROL SULFATE 2.5 MG: 2.5 SOLUTION RESPIRATORY (INHALATION) at 13:34

## 2023-03-10 RX ADMIN — MIDAZOLAM HYDROCHLORIDE 4 MG: 1 INJECTION, SOLUTION INTRAMUSCULAR; INTRAVENOUS at 04:54

## 2023-03-10 RX ADMIN — Medication 10 ML: at 08:01

## 2023-03-10 RX ADMIN — PROPOFOL 35 MCG/KG/MIN: 10 INJECTION, EMULSION INTRAVENOUS at 10:02

## 2023-03-10 RX ADMIN — ALBUTEROL SULFATE 2.5 MG: 2.5 SOLUTION RESPIRATORY (INHALATION) at 07:19

## 2023-03-10 RX ADMIN — ATROPINE SULFATE 1 MG: 0.1 INJECTION INTRAVENOUS at 00:29

## 2023-03-10 RX ADMIN — DOPAMINE HYDROCHLORIDE 5 MCG/KG/MIN: 320 INJECTION, SOLUTION INTRAVENOUS at 01:46

## 2023-03-10 RX ADMIN — ALBUTEROL SULFATE 2.5 MG: 2.5 SOLUTION RESPIRATORY (INHALATION) at 17:19

## 2023-03-10 RX ADMIN — VANCOMYCIN HYDROCHLORIDE 1500 MG: 10 INJECTION, POWDER, LYOPHILIZED, FOR SOLUTION INTRAVENOUS at 05:39

## 2023-03-10 RX ADMIN — LINEZOLID 600 MG: 600 TABLET, FILM COATED ORAL at 21:42

## 2023-03-10 RX ADMIN — LEVOTHYROXINE SODIUM ANHYDROUS 150 MCG: 100 INJECTION, POWDER, LYOPHILIZED, FOR SOLUTION INTRAVENOUS at 03:48

## 2023-03-10 RX ADMIN — PROPOFOL 35 MCG/KG/MIN: 10 INJECTION, EMULSION INTRAVENOUS at 14:59

## 2023-03-10 RX ADMIN — CEFTRIAXONE 2000 MG: 2 INJECTION, POWDER, FOR SOLUTION INTRAMUSCULAR; INTRAVENOUS at 02:10

## 2023-03-10 RX ADMIN — IPRATROPIUM BROMIDE 0.5 MG: 0.5 SOLUTION RESPIRATORY (INHALATION) at 13:35

## 2023-03-10 RX ADMIN — HYDROCORTISONE SODIUM SUCCINATE 100 MG: 100 INJECTION, POWDER, FOR SOLUTION INTRAMUSCULAR; INTRAVENOUS at 08:05

## 2023-03-10 RX ADMIN — HEPARIN SODIUM 5000 UNITS: 5000 INJECTION INTRAVENOUS; SUBCUTANEOUS at 15:23

## 2023-03-10 RX ADMIN — FENTANYL CITRATE 50 MCG/HR: 50 INJECTION, SOLUTION INTRAMUSCULAR; INTRAVENOUS at 05:03

## 2023-03-10 RX ADMIN — PROPOFOL 20 MCG/KG/MIN: 10 INJECTION, EMULSION INTRAVENOUS at 05:32

## 2023-03-10 RX ADMIN — HEPARIN SODIUM 5000 UNITS: 5000 INJECTION INTRAVENOUS; SUBCUTANEOUS at 06:41

## 2023-03-10 RX ADMIN — BUDESONIDE 500 MCG: 0.5 SUSPENSION RESPIRATORY (INHALATION) at 07:19

## 2023-03-10 RX ADMIN — BUDESONIDE 500 MCG: 0.5 SUSPENSION RESPIRATORY (INHALATION) at 17:26

## 2023-03-10 RX ADMIN — IPRATROPIUM BROMIDE 0.5 MG: 0.5 SOLUTION RESPIRATORY (INHALATION) at 10:40

## 2023-03-10 RX ADMIN — ATROPINE SULFATE 1 MG: 0.1 INJECTION, SOLUTION ENDOTRACHEAL; INTRAMUSCULAR; INTRAVENOUS; SUBCUTANEOUS at 00:29

## 2023-03-10 RX ADMIN — IPRATROPIUM BROMIDE 0.5 MG: 0.5 SOLUTION RESPIRATORY (INHALATION) at 17:19

## 2023-03-10 RX ADMIN — ALBUTEROL SULFATE 2.5 MG: 2.5 SOLUTION RESPIRATORY (INHALATION) at 10:39

## 2023-03-10 RX ADMIN — CALCIUM GLUCONATE 1000 MG: 20 INJECTION, SOLUTION INTRAVENOUS at 00:36

## 2023-03-10 RX ADMIN — CEFEPIME 1000 MG: 1 INJECTION, POWDER, FOR SOLUTION INTRAMUSCULAR; INTRAVENOUS at 15:30

## 2023-03-10 RX ADMIN — HEPARIN SODIUM 5000 UNITS: 5000 INJECTION INTRAVENOUS; SUBCUTANEOUS at 21:43

## 2023-03-10 RX ADMIN — LEVOTHYROXINE SODIUM ANHYDROUS 50 MCG: 100 INJECTION, POWDER, LYOPHILIZED, FOR SOLUTION INTRAVENOUS at 02:47

## 2023-03-10 RX ADMIN — IPRATROPIUM BROMIDE 0.5 MG: 0.5 SOLUTION RESPIRATORY (INHALATION) at 07:19

## 2023-03-10 RX ADMIN — DOPAMINE HYDROCHLORIDE IN DEXTROSE 7.5 MCG/KG/MIN: 3.2 INJECTION, SOLUTION INTRAVENOUS at 19:00

## 2023-03-10 RX ADMIN — HYDROCORTISONE SODIUM SUCCINATE 100 MG: 100 INJECTION, POWDER, FOR SOLUTION INTRAMUSCULAR; INTRAVENOUS at 02:10

## 2023-03-10 RX ADMIN — FENTANYL CITRATE 75 MCG/HR: 50 INJECTION, SOLUTION INTRAMUSCULAR; INTRAVENOUS at 19:44

## 2023-03-10 RX ADMIN — PROPOFOL 35 MCG/KG/MIN: 10 INJECTION, EMULSION INTRAVENOUS at 19:55

## 2023-03-10 RX ADMIN — LEVOTHYROXINE SODIUM ANHYDROUS 100 MCG: 100 INJECTION, POWDER, LYOPHILIZED, FOR SOLUTION INTRAVENOUS at 08:05

## 2023-03-10 RX ADMIN — DOPAMINE HYDROCHLORIDE IN DEXTROSE 5 MCG/KG/MIN: 3.2 INJECTION, SOLUTION INTRAVENOUS at 03:53

## 2023-03-10 RX ADMIN — DOPAMINE HYDROCHLORIDE IN DEXTROSE 5 MCG/KG/MIN: 3.2 INJECTION, SOLUTION INTRAVENOUS at 01:46

## 2023-03-10 ASSESSMENT — PULMONARY FUNCTION TESTS
PIF_VALUE: 23
PIF_VALUE: 22
PIF_VALUE: 25
PIF_VALUE: 21
PIF_VALUE: 27
PIF_VALUE: 21
PIF_VALUE: 23
PIF_VALUE: 23
PIF_VALUE: 33
PIF_VALUE: 21
PIF_VALUE: 41
PIF_VALUE: 27
PIF_VALUE: 23
PIF_VALUE: 23
PIF_VALUE: 26
PIF_VALUE: 20
PIF_VALUE: 21
PIF_VALUE: 22
PIF_VALUE: 24
PIF_VALUE: 22
PIF_VALUE: 23
PIF_VALUE: 33
PIF_VALUE: 20
PIF_VALUE: 22
PIF_VALUE: 22
PIF_VALUE: 23
PIF_VALUE: 26
PIF_VALUE: 22
PIF_VALUE: 22
PIF_VALUE: 24
PIF_VALUE: 21
PIF_VALUE: 51
PIF_VALUE: 23
PIF_VALUE: 24
PIF_VALUE: 26
PIF_VALUE: 24
PIF_VALUE: 21
PIF_VALUE: 26
PIF_VALUE: 25
PIF_VALUE: 23
PIF_VALUE: 50

## 2023-03-10 ASSESSMENT — PAIN SCALES - GENERAL
PAINLEVEL_OUTOF10: 0

## 2023-03-10 NOTE — PROGRESS NOTES
Patient came down to Special Procedures for ultrasound guided right thoracentesis. Procedure was explained, questions were answered. 1612  Starting procedure /63 75 19 100% on the ventilator    1618  Ending procedure /78 75 19 98% on the ventilator    1000 cc of clear yellow  color pleural fluid drained from patient, petrolatum dressing folded 4 x 4 and tegaderm applied to right back. Patient tolerated procedure    Post procedure chest xray taken    Respiratory came took specimen for [facility]    Specimen sent to lab with labels from the floor    Nurse to nurse given to [address] [place] [address], ICU nurse in specials with patient. Patient transported to Ct scan for next test with ICU nurse and respiratory.

## 2023-03-10 NOTE — CONSULTS
[facility] MD Estefani Thompson MD Elyce Junior, MD Ortencia Linden, CNP Crystal Christopherton, ANP Vianne Hugh, CNP  Consultation  Patient's Name: Molina Tompkins  1:54 PM  3/10/2023    Nephrologist: Lia Gray MD    Reason for Consult:  Chronic kidney disease     History Obtained From: chart    History of Present Ilness:         Ms. Fatoumata Rivas was sent to the emergency room from the nursing home for bradycardia, hypotension, and fatigue for the past few days. Her TSH was severely elevated at 115 on admission and she was admitted for Myxedema Coma. She also tested positive for a urinary tract infection and was started on intravenous antibiotics. She received intravenous synthroid and was started on a dopamine infusion for the bradycardia and hypotension. She was then admitted to the intensive care unit for closer evaluation. Arterial blood gases showed respiratory acidosis with hypercapnia and she was subsequently intubated due to ongoing lethargy and acidosis. Her past medical history is significant for atrial fibrillation, asthma, coronary artery disease, chronic kidney disease requiring hemodialysis, COPD, diabetes, hyperlipidemia, hypertension, and parkinson's disease. Ms. Fatoumata Rivas is well known to our nephrology practice and was followed during a recent hospitalization last month. She was seen for acute kidney injury and volume overload that was initially treated with a Bumex drip. Her kidney function worsened and she remained hypervolemic despite the Bumex drip which ultimately resulted in initiation of hemodialysis. She was discharged from the hospital on 2/24. She has been undergoing hemodialysis at [facility] on Mondays, Wednesdays, and Fridays. She has a right internal jugular tunneled dialysis catheter. She does have chronic kidney disease with a baseline creatinine of 1.4-1.7 mg/dL.  Her chronic kidney disease is secondary to diabetic nephropathy and renal microvascular atherosclerotic disease with 0.5 grams of proteinuria. She is currently ventilated and sedated. On a dopamine drip. Heart rate and blood pressure have improved. She is seen while on dialysis. Tolerating treatment without complications. Blood pressure did decrease as dialysis continued.      Past Medical History:   Diagnosis Date    A-fib [facility])     Acute on chronic congestive heart failure (HCC)     Anxiety     Asthma     CAD (coronary artery disease) 01/21/2016    Cancer ([facility]  breast ca 2006    right lumpectomy    Chronic kidney disease     nephrolithiasis    COPD exacerbation ([facility] 10/12/2022    Depression     Diabetes mellitus ([facility]     H/O mammogram     Hx MRSA infection     toe infection january 2012    Hyperlipidemia     Hypertension     Lateral epicondylitis     Morbid obesity (HCC)     SERENA on CPAP     Oxygen dependent     Parkinson's disease ([facility]     Tubal ligation status        Past Surgical History:   Procedure Laterality Date    BREAST LUMPECTOMY      BREAST REDUCTION SURGERY      CARDIAC CATHETERIZATION  4/28/2014    Dr. Federico Fernández  01/11/2022    Dr. Dina Reveles  7/29/15    CT GUIDED CHEST TUBE  7/8/2022    CT GUIDED CHEST TUBE 7/8/2022 Dary Farrar MD [facility]    ENDOSCOPY, COLON, DIAGNOSTIC  7/19/15    GALLBLADDER SURGERY      IR PERC CATH PLEURAL DRAIN W/IMAG  2/14/2023    IR PERC CATH PLEURAL DRAIN W/IMAG 2/14/2023 [facility]    IR REMOVAL OF TUNNELED PLEURAL CATH W CUFF  2/23/2023    IR REMOVAL OF TUNNELED PLEURAL CATH W CUFF 2/23/2023 [facility]    LUMBAR LAMINECTOMY      TOE AMPUTATION      TONSILLECTOMY      UPPER GASTROINTESTINAL ENDOSCOPY      UPPER GASTROINTESTINAL ENDOSCOPY N/A 7/19/2022    EGD ESOPHAGOGASTRODUODENOSCOPY performed by Tiffanie Bates MD at [facility] ENDOSCOPY       Family History   Problem Relation Age of Onset    Cancer Mother         Lung Ca    Cancer Father lung Ca    Diabetes Sister     Heart Disease Sister     Seizures Son     Other Brother         sepsis        reports that she has never smoked. She has never used smokeless tobacco. She reports that she does not drink alcohol and does not use drugs.    Allergies:  Ciprofloxacin, Codeine, and Digoxin and related    Current Medications:    hydrocortisone sodium succinate PF (SOLU-CORTEF) injection 100 mg, Q8H  levothyroxine (SYNTHROID) injection 100 mcg, Daily  heparin (porcine) injection 5,000 Units, 3 times per day  sodium chloride flush 0.9 % injection 10 mL, 2 times per day  sodium chloride flush 0.9 % injection 10 mL, PRN  0.9 % sodium chloride infusion, PRN  promethazine (PHENERGAN) tablet 12.5 mg, Q6H PRN   Or  ondansetron (ZOFRAN) injection 4 mg, Q6H PRN  polyethylene glycol (GLYCOLAX) packet 17 g, Daily PRN  acetaminophen (TYLENOL) tablet 650 mg, Q6H PRN   Or  acetaminophen (TYLENOL) suppository 650 mg, Q6H PRN  DOPamine (INTROPIN) 800 mg in dextrose 5 % 250 mL infusion, Continuous  etomidate (AMIDATE) injection 20 mg, Once  propofol injection, Titrated  fentaNYL 10 mcg/ml in 0.9%  ml infusion, Continuous   And  fentaNYL bolus from bag, Q30 Min PRN  budesonide (PULMICORT) nebulizer suspension 500 mcg, BID  albuterol (PROVENTIL) nebulizer solution 2.5 mg, Q4H WA   And  ipratropium (ATROVENT) 0.02 % nebulizer solution 0.5 mg, Q4H WA  cefepime (MAXIPIME) 1,000 mg in sodium chloride 0.9 % 50 mL IVPB ([id number]), Q12H  linezolid (ZYVOX) tablet 600 mg, 2 times per day        Review of Systems:   Unable obtain    Physical exam:  General Appearance:  Ventilated and sedated   Skin:  Skin color, texture, turgor normal. No rashes or lesions.  Eyes:  PERRL, EOMI, Sclera nonicteric, and Conjunctiva clear  Ears:  canals and TMs intact  Nose/Sinuses:  Nares normal. Septum midline. Mucosa normal. No drainage or sinus tenderness.  Mouth/Throat:  Mucosa moist.  No lesions.  Oropharynx with ETT   Neck:  neck- supple, no  mass, non-tender. [facility] tes  Lungs:  Normal expansion. Clear to auscultation, diminished in the bases. No rales, rhonchi, or wheezing. Heart:   Regular rate and rhythm without murmur, gallop or rub. Abdomen:  Soft, non-tender, normal bowel sounds. Distended, abdominal edema has improved (+1). No bruits, organomegaly or masses. Extremities: Extremities warm to touch, pink, with trace dependent edema. Musculoskeletal:  No joint swelling, deformity, or tenderness.   Peripheral Pulses:  Normal  Neurologic:  Ventilated and sedated         Data:   Labs:  CBC with Differential:    Lab Results   Component Value Date/Time    WBC 11.4 03/10/2023 04:40 AM    RBC 4.12 03/10/2023 04:40 AM    HGB 10.1 03/10/2023 04:40 AM    HCT 40.1 03/10/2023 04:40 AM     03/10/2023 04:40 AM    MCV 97.3 03/10/2023 04:40 AM    MCH 24.5 03/10/2023 04:40 AM    MCHC 25.2 03/10/2023 04:40 AM    RDW 20.8 03/10/2023 04:40 AM    NRBC 0.9 03/10/2023 12:01 AM    SEGSPCT 74 08/20/2013 10:45 AM    METASPCT 0.9 03/10/2023 04:40 AM    LYMPHOPCT 6.1 03/10/2023 04:40 AM    MONOPCT 2.6 03/10/2023 04:40 AM    MYELOPCT 1.8 03/10/2023 12:01 AM    EOSPCT 1 06/16/2017 12:00 AM    BASOPCT 0.7 03/10/2023 04:40 AM    MONOSABS 0.34 03/10/2023 04:40 AM    LYMPHSABS 0.68 03/10/2023 04:40 AM    EOSABS 0.10 03/10/2023 04:40 AM    BASOSABS 0.00 03/10/2023 04:40 AM     CMP:    Lab Results   Component Value Date/Time     03/10/2023 04:40 AM    K 4.2 03/10/2023 04:40 AM    K 3.6 02/20/2023 01:42 PM    CL 98 03/10/2023 04:40 AM    CO2 32 03/10/2023 04:40 AM    BUN 17 03/10/2023 04:40 AM    CREATININE 2.8 03/10/2023 04:40 AM    GFRAA >60 10/16/2022 09:20 AM    LABGLOM 17 03/10/2023 04:40 AM    GLUCOSE 141 03/10/2023 04:40 AM    PROT 6.0 03/10/2023 12:01 AM    LABALBU 3.8 03/10/2023 12:01 AM    CALCIUM 9.3 03/10/2023 04:40 AM    BILITOT 0.5 03/10/2023 12:01 AM    ALKPHOS 81 03/10/2023 12:01 AM    AST 6 03/10/2023 12:01 AM    ALT <5 03/10/2023 12:01 AM     Ionized Calcium:  No results found for: IONCA  Magnesium:    Lab Results   Component Value Date/Time    MG 2.0 03/10/2023 04:40 AM     Phosphorus:    Lab Results   Component Value Date/Time    PHOS 2.8 03/10/2023 04:40 AM     U/A:    Lab Results   Component Value Date/Time    COLORU Yellow 03/10/2023 12:01 AM    PHUR 6.5 03/10/2023 12:01 AM    WBCUA >20 03/10/2023 12:01 AM    RBCUA 10-20 03/10/2023 12:01 AM    RBCUA NONE 03/25/2013 09:00 PM    YEAST RARE 10/27/2015 07:18 PM    BACTERIA MANY 03/10/2023 12:01 AM    CLARITYU CLOUDY 03/10/2023 12:01 AM    SPECGRAV 1.020 03/10/2023 12:01 AM    LEUKOCYTESUR LARGE 03/10/2023 12:01 AM    UROBILINOGEN 0.2 03/10/2023 12:01 AM    BILIRUBINUR Negative 03/10/2023 12:01 AM    BLOODU MODERATE 03/10/2023 12:01 AM    GLUCOSEU Negative 03/10/2023 12:01 AM     Microalbumen/Creatinine ratio:  No components found for: RUCREAT  Iron Saturation:  No components found for: PERCENTFE  TIBC:    Lab Results   Component Value Date/Time    TIBC 273 02/06/2023 06:47 AM     FERRITIN:    Lab Results   Component Value Date/Time    FERRITIN 60 02/06/2023 06:47 AM        Imaging:  XR ABDOMEN FOR NG/OG/NE TUBE PLACEMENT   Final Result   1. Endotracheal tube and gastric tube with good positioning. 2. Right jugular hemodialysis catheter remains with good positioning. 3. Improved aeration of the right base with small right pleural effusion now   evident. RECOMMENDATION:   Careful clinical correlation and follow up recommended. XR CHEST PORTABLE   Final Result   1. Endotracheal tube and gastric tube with good positioning. 2. Right jugular hemodialysis catheter remains with good positioning. 3. Improved aeration of the right base with small right pleural effusion now   evident. RECOMMENDATION:   Careful clinical correlation and follow up recommended. XR CHEST PORTABLE   Final Result   Mild pulmonary vascular congestion.       Interval worsening of right upper lobe and right middle lobe atelectasis. Persistent right lower lobe atelectasis and/or pneumonitis. Small right pleural effusion. CT CHEST WO CONTRAST    (Results Pending)   IR GUIDED THORACENTESIS PLEURAL    (Results Pending)       Assessment  Acute on chronic kidney disease requiring initiation of hemodialysis during a recent hospitalization. Hemodialysis on Mondays, Wednesdays, and Fridays. Chronic kidney disease stage III, baseline creatinine 1.4-1.7 mg/dL secondary to diabetic nephropathy and renal microvascular atherosclerotic disease. Anemia due to CKD  Myxedema Coma   Acute hypercapnic respiratory failure     Plan  Continue IHD support for solute and volume clearance on MWF  Ultrafiltration as warranted   No need for YVONNE, Hgb and Hct stable   10 mg midodrine on dialysis days, please give prior to dialysis   Fu blood cutlures   Follow labs  Monitor I&O  Continue intensive supportive care       Thank you for the opportunity to participate in the care of your pleasant patient. We look forward to following along with you. Patient seen and examined. Findings and physical exam confirmed independently by me.    Case discussed with Cassandra Robert CNP  As below, except as annotated    Electronically signed by RASHARD Fuentes CNP on 3/10/2023 at 1:54 PM

## 2023-03-10 NOTE — SIGNIFICANT EVENT
Arterial blood gases showed worsening respiratory acidosis with a pH of 7.15, PCO2 of 94.5, PO2 75.6 and HCO3 of 33. Patient remains lethargic. Patient intubated due to respiratory acidosis and lethargy. Patient's daughter Gayla Hendricks notified of intubation.

## 2023-03-10 NOTE — CONSULTS
Assessment:  Myxedema coma  Acute on chronic hypercapnic respiratory failure  Acute on chronic congestive heart failure-EF 65% January 18, 2023  Urinary tract infection  Pleural effusion possible healthcare associated pneumonia  Chronic renal failure, stage IV requiring hemodialysis  Elevated troponin (chronic)  Hypotension  Bradycardia    Plan:   Maintain BiPAP wean FiO2 to maintain SPO2 greater than 90%  Recheck arterial blood gases may have to intubate  Given additional 150 mcg of IV Synthroid then 100 mcg daily  Hydrocortisone 100 mg every 8 hours  Check cortisol level  Check TSH daily  Check lactic acid  Check procalcitonin  Obtain CT scan of the chest  Continue dopamine titrate for MAP greater than 65  Continue empiric antibiotic coverage with Rocephin  Hemodialysis per nephrology  Blood cultures pending  Check urine culture  DVT prophylaxis with heparin      History of Present Illness:   Patient is a 76-year-old female with a past medical history of acute on chronic congestive heart failure, atrial fibrillation on eliquis, asthma, coronary artery disease, chronic kidney disease requiring hemodialysis, diabetes mellitus, hyperlipidemia, Parkinson's disease, and obstructive sleep apnea. She was sent into the emergency department from the nursing home today for bradycardia, hypotension, general fatigue and weakness for the last couple days. She did receive dialysis yesterday. ED evaluation showed the patient to be hypotensive and bradycardic. Her proBNP greater than 70,000, elevated troponin of 169 but this was elevated during her last hospitalization. Elevated TSH at 115.3 and a low T4 1.2. Urinalysis was positive for urinary tract infection. Arterial blood gases showed a pH of 7.13, PCO2 of 94.3, PO2 81.9, and HCO3 of 32.1. She was placed on BiPAP. She was given 1 dose of Rocephin for urinary tract infection, 1 dose of atropine for bradycardia and dopamine infusion started.   She also received 50 mcg of Synthroid. March 10, 2023:  Patient seen and examined at the bedside in the emergency department. She is lethargic but arousable with the light sternal rub. Once awake she was able to follow directions and move all extremities she is very soft-spoken and very weak in appearance. We will recheck arterial blood gases and intubate if needed. We will give an additional 150 mcg of IV Synthroid followed by 100 mcg daily for the myxedema.     Past Medical History:  Past Medical History:   Diagnosis Date    A-fib ([address]     Acute on chronic congestive heart failure (HCC)     Anxiety     Asthma     CAD (coronary artery disease) 01/21/2016    Cancer ([address]  breast ca 2006    right lumpectomy    Chronic kidney disease     nephrolithiasis    COPD exacerbation ([address] 10/12/2022    Depression     Diabetes mellitus ([address]     H/O mammogram     Hx MRSA infection     toe infection january 2012    Hyperlipidemia     Hypertension     Lateral epicondylitis     Morbid obesity (HCC)     SERENA on CPAP     Oxygen dependent     Parkinson's disease ([address]     Tubal ligation status       Past Surgical History:   Procedure Laterality Date    BREAST LUMPECTOMY      BREAST REDUCTION SURGERY      CARDIAC CATHETERIZATION  4/28/2014    Dr. Rik Odom  01/11/2022    Dr. Vitaliy Choudhary  7/29/15    CT GUIDED CHEST TUBE  7/8/2022    CT GUIDED CHEST TUBE 7/8/2022 Karel Quinteros MD [facility]    ENDOSCOPY, COLON, DIAGNOSTIC  7/19/15    GALLBLADDER SURGERY      IR PERC CATH PLEURAL DRAIN W/IMAG  2/14/2023    IR PERC CATH PLEURAL DRAIN W/IMAG 2/14/2023 [facility]    IR REMOVAL OF TUNNELED PLEURAL CATH W CUFF  2/23/2023    IR REMOVAL OF TUNNELED PLEURAL CATH W CUFF 2/23/2023 [facility]    LUMBAR LAMINECTOMY      TOE AMPUTATION      TONSILLECTOMY      UPPER GASTROINTESTINAL ENDOSCOPY      UPPER GASTROINTESTINAL ENDOSCOPY N/A 7/19/2022    EGD ESOPHAGOGASTRODUODENOSCOPY performed by Armando Mares MD at [facility] ENDOSCOPY       Family History:   [unfilled]    Allergies:         Ciprofloxacin, Codeine, and Digoxin and related    Social history:  Social History     Socioeconomic History    Marital status:      Spouse name: Not on file    Number of children: Not on file    Years of education: Not on file    Highest education level: Not on file   Occupational History    Not on file   Tobacco Use    Smoking status: Never    Smokeless tobacco: Never   Vaping Use    Vaping Use: Never used   Substance and Sexual Activity    Alcohol use: No    Drug use: No    Sexual activity: Never   Other Topics Concern    Not on file   Social History Narrative    Not on file     Social Determinants of Health     Financial Resource Strain: Not on file   Food Insecurity: Not on file   Transportation Needs: Not on file   Physical Activity: Not on file   Stress: Not on file   Social Connections: Not on file   Intimate Partner Violence: Not on file   Housing Stability: Not on file       Current Medications:   No current facility-administered medications for this encounter. Current Outpatient Medications:     metoprolol succinate (TOPROL XL) 25 MG extended release tablet, Take 1 tablet by mouth daily Hold if SBP is less than 100 or HR less than 55, Disp: 30 tablet, Rfl: 3    BiPAP Machine MISC, by Does not apply route Nightly.  12/6 30% PER NURSING HOME PAPERWORK, Disp: , Rfl:     glucagon, rDNA, 1 MG injection, Inject 1 mg into the muscle as needed for Low blood sugar, Disp: , Rfl:     Glucose (TRUEPLUS) 15 GM/32ML GEL, Take 15 g by mouth as needed, Disp: , Rfl:     hydrOXYzine pamoate (VISTARIL) 25 MG capsule, Take 25 mg by mouth 3 times daily as needed for Itching, Disp: , Rfl:     acetaminophen (TYLENOL) 325 MG tablet, Take 650 mg by mouth every 4 hours as needed for Pain, Disp: , Rfl:     apixaban (ELIQUIS) 5 MG TABS tablet, Take 1 tablet by mouth 2 times daily, Disp: 60 tablet, Rfl: 0    midodrine (PROAMATINE) 5 MG tablet, Take 1 tablet by mouth 3 times daily (with meals), Disp: 90 tablet, Rfl: 0    sertraline (ZOLOFT) 50 MG tablet, Take 50 mg by mouth daily, Disp: , Rfl:     loperamide (IMODIUM) 2 MG capsule, Take 2 mg by mouth 4 times daily as needed for Diarrhea, Disp: , Rfl:     OXYGEN, Inhale 3 L into the lungs continuous, Disp: , Rfl:     docusate sodium (COLACE, DULCOLAX) 100 MG CAPS, Take 100 mg by mouth 2 times daily as needed for Constipation, Disp: , Rfl:     atorvastatin (LIPITOR) 20 MG tablet, Take 20 mg by mouth nightly, Disp: , Rfl:     ipratropium-albuterol (DUONEB) 0.5-2.5 (3) MG/3ML SOLN nebulizer solution, Inhale 1 vial into the lungs 4 times daily, Disp: , Rfl:     miconazole (MICOTIN) 2 % powder, Apply 1 each topically 2 times daily Apply topically under breasts twice daily, Disp: , Rfl:     pantoprazole (PROTONIX) 40 MG tablet, Take 40 mg by mouth every morning, Disp: , Rfl:     mirtazapine (REMERON) 15 MG tablet, Take 7.5 mg by mouth nightly, Disp: , Rfl:     budesonide-formoterol (SYMBICORT) 160-4.5 MCG/ACT AERO, Inhale 2 puffs into the lungs 2 times daily, Disp: , Rfl:     carbidopa-levodopa (SINEMET CR)  MG per extended release tablet, Take 2 tablets by mouth in the morning and 2 tablets at noon and 2 tablets in the evening., Disp: , Rfl:     rOPINIRole (REQUIP) 1 MG tablet, Take 1 tablet by mouth in the morning and 1 tablet at noon and 1 tablet before bedtime. , Disp: 90 tablet, Rfl: 3    sucralfate (CARAFATE) 1 GM tablet, Take 1 tablet by mouth in the morning and 1 tablet at noon and 1 tablet in the evening and 1 tablet before bedtime. , Disp: 120 tablet, Rfl: 1    montelukast (SINGULAIR) 10 MG tablet, Take 10 mg by mouth nightly, Disp: , Rfl:     Review of Systems:   Unable to assess due to lethargy    Physical Exam:   Vital Signs:  BP (!) 129/59   Pulse 76   Temp 98 °F (36.7 °C) (Oral)   Resp 16   Wt 223 lb (101.2 kg)   SpO2 97%   BMI 43.55 kg/m²     Input/Output:  No intake/output data recorded. Oxygen requirements: BiPAP 18/8 FiO2 60%       General appearance: Ill-appearing and lethargic   HEENT: Atraumatic/normocephalic, EOMI, MEGAN, pharynx clear, dry mucosa  Neck: Supple, no jugular venous distension, lymphadenopathy, thyromegaly or carotid bruits  Chest: Diminished breath sounds right greater than left no wheezing, respiratory crackles bilateral bases posteriorly   cardiovascular: Normal S1 , S2, regular rate and rhythm, no murmur, rub or gallop  Abdomen: Normal sounds present, soft, lax with no tenderness, no hepatosplenomegaly, and no masses obese  Extremities: No edema. Pulses are equally present. Skin: intact, no rashes   Neurologic: Lethargic but able to move all extremities and follow directions     Investigations:  Labs, radiological imaging and cardiac work up were reviewed        ICU NURSE PRACTITIONER NOTE OF PERSONAL INVOLVEMENT IN CARE  As the nurse practitioner, I certify that I personally reviewed the patient's history and personally examined the patient to confirm the physical findings described above, and that I reviewed the relevant imaging studies and available reports. I also discussed the differential diagnosis and all of the proposed management plans with the patient and individuals accompanying the patient to this visit. They had the opportunity to ask questions about the proposed management plans and to have those questions answered. This patient has a high probability of sudden, clinically significant deterioration, which requires the highest level of preparedness to intervene urgently. I managed/supervised life or organ supporting interventions that required frequent  assessment. I devoted my full attention to the direct care of this patient for the amount of time indicated below. Time I spent with family or surrogate(s) is included only if the patient was incapable of providing the necessary information or participating in medical decision-making.   Time devoted to teaching and to any procedures I billed separately is not included.       Critical Care Time: 45 minutes      Electronically signed by RASHARD Lopez CNP on 3/10/2023 at 2:49 AM

## 2023-03-10 NOTE — PROGRESS NOTES
Pharmacy Consultation Note  (Antibiotic Dosing and Monitoring)    Initial consult date: 03/10/2023  Consulting physician/provider: Rice  Drug: Vancomycin  Indication: HAP    Age/  Gender Height Weight IBW  Allergy Information   72 y.o./female   223 lb (101.2 kg)     Ideal body weight: 45.5 kg (100 lb 4.9 oz)  Adjusted ideal body weight: 66.4 kg (146 lb 5.5 oz)   Ciprofloxacin, Codeine, and Digoxin and related      Renal Function:  Recent Labs     03/10/23  0001   BUN 16   CREATININE 2.9*     No intake or output data in the 24 hours ending 03/10/23 0452    Vancomycin Monitoring:  Trough:  No results for input(s): VANCOTROUGH in the last 72 hours. Random:  No results for input(s): VANCORANDOM in the last 72 hours. No results for input(s): Pixie Presume in the last 72 hours. Historical Cultures:  Organism   Date Value Ref Range Status   01/18/2023 Enterococcus faecium (A)  Final     No results for input(s): BC in the last 72 hours. Vancomycin Administration Times:  Recent vancomycin administrations        No vancomycin IV orders with administrations found. Assessment:  Patient is a 67 y.o. female who has been initiated on vancomycin  Estimated Creatinine Clearance: 18 mL/min (A) (based on SCr of 2.9 mg/dL (H)). To dose vancomycin, pharmacy will be utilizing dosing based off of levels because of patient's renal impairment/insufficiency    Plan:   Will check vancomycin levels when appropriate  Will continue to monitor renal function   Pharmacy to follow      Cesar Mariee [address] [place] [address] 3/10/2023 4:52 AM

## 2023-03-10 NOTE — CONSULTS
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 female who presents with   has a past medical history of A-fib ([address] Acute on chronic congestive heart failure ([address] Anxiety, Asthma, CAD (coronary artery disease), Cancer ([address] Chronic kidney disease, COPD exacerbation ([address] Depression, Diabetes mellitus ([address] H/O mammogram, Hx MRSA infection, Hyperlipidemia, Hypertension, Lateral epicondylitis, Morbid obesity ([address] SERENA on CPAP, Oxygen dependent, Parkinson's disease ([address] and Tubal ligation status.    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]  Elevated troponin [R77.8]  Acute respiratory failure with hypercapnia (HCC) [J96.02]  Chronic renal failure, stage 4 (severe) (HCC) [N18.4]  Hypotension, unspecified hypotension type [I95.9]  Acute on chronic congestive heart failure, unspecified heart failure type ([facility] [I50.9]  Known to ID last seen  d/c on   NSTEMI (non-ST elevated myocardial infarction) ([facility] [I21.4]  Atrial fibrillation with rapid ventricular response    Recurrent right pleural effusion   Hypotension, unspecified hypotension type    Acute on chronic respiratory failure with hypoxia and hypercapnia    Hypothermia better   Vre uti  off atbx  Left le cons colonized not infected stable healed  2/1 pleural f luid GP diplococci  MRSA neg   Blood cx Neg  Came in due to  bradycardia, hypotension, general fatigue and weakness for the last couple days  Admitted to ICU   1. Myxedema coma ([address]    2. Acute on chronic congestive heart failure, unspecified heart failure type ([address]    3. Chronic renal failure, stage 4 (severe) ([facility])    4. Elevated troponin    5. Hypotension, unspecified hypotension type    6. Acute respiratory failure with hypercapnia ([address]     Pt was intubated    Temp 98->95.9 hypotensive  on vent   Currently on  hydrocortisone sodium succinate PF (SOLU-CORTEF) injection 100 mg, Q8H  cefepime (MAXIPIME) 1,000 mg in sodium chloride 0.9 % 50 mL IVPB (Recn5Bcg), Q12H  linezolid (ZYVOX) tablet 600 mg, 2 times per day        REVIEW OF SYSTEMS     CONSTITUTIONAL:   Intubated     Medications Prior to Admission: metoprolol succinate (TOPROL XL) 25 MG extended release tablet, Take 1 tablet by mouth daily Hold if SBP is less than 100 or HR less than 55  BiPAP Machine MISC, by Does not apply route Nightly.  12/6 30% PER NURSING HOME PAPERWORK  glucagon, rDNA, 1 MG injection, Inject 1 mg into the muscle as needed for Low blood sugar  Glucose (TRUEPLUS) 15 GM/32ML GEL, Take 15 g by mouth as needed  hydrOXYzine pamoate (VISTARIL) 25 MG capsule, Take 25 mg by mouth 3 times daily as needed for Itching  acetaminophen (TYLENOL) 325 MG tablet, Take 650 mg by mouth every 4 hours as needed for Pain  apixaban (ELIQUIS) 5 MG TABS tablet, Take 1 tablet by mouth 2 times daily  midodrine (PROAMATINE) 5 MG tablet, Take 1 tablet by mouth 3 times daily (with meals)  sertraline (ZOLOFT) 50 MG tablet, Take 50 mg by mouth daily  loperamide (IMODIUM) 2 MG capsule, Take 2 mg by mouth 4 times daily as needed for Diarrhea  OXYGEN, Inhale 3 L into the lungs continuous  docusate sodium (COLACE, DULCOLAX) 100 MG CAPS, Take 100 mg by mouth 2 times daily as needed for Constipation  atorvastatin (LIPITOR) 20 MG tablet, Take 20 mg by mouth nightly  ipratropium-albuterol (DUONEB) 0.5-2.5 (3) MG/3ML SOLN nebulizer solution, Inhale 1 vial into the lungs 4 times daily  miconazole (MICOTIN) 2 % powder, Apply 1 each topically 2 times daily Apply topically under breasts twice daily  pantoprazole (PROTONIX) 40 MG tablet, Take 40 mg by mouth every morning  mirtazapine (REMERON) 15 MG tablet, Take 7.5 mg by mouth nightly  budesonide-formoterol (SYMBICORT) 160-4.5 MCG/ACT AERO, Inhale 2 puffs into the lungs 2 times daily  carbidopa-levodopa (SINEMET CR)  MG per extended release tablet, Take 2 tablets by mouth in the morning and 2 tablets at noon and 2 tablets in the evening.  rOPINIRole (REQUIP) 1 MG tablet, Take 1 tablet by mouth in the morning and 1 tablet at noon and 1 tablet before bedtime.  sucralfate (CARAFATE) 1 GM tablet, Take 1 tablet by mouth in the morning and 1 tablet at noon and 1 tablet in the evening and 1 tablet before bedtime.  montelukast (SINGULAIR) 10 MG tablet, Take 10 mg by mouth nightly  CURRENT MEDICATIONS     Current Facility-Administered Medications:     hydrocortisone sodium succinate PF (SOLU-CORTEF) injection 100 mg, 100 mg, IntraVENous, Q8H, RASHARD Childs CNP, 100 mg at 03/10/23 0805    levothyroxine (SYNTHROID) injection 100 mcg, 100 mcg, IntraVENous, Daily, RASHARD Childs CNP, 100 mcg at 03/10/23 0805    heparin (porcine) injection 5,000 Units, 5,000 Units, SubCUTAneous, 3 times per day, RASHARD Childs CNP, 5,000 Units at 03/10/23 0641    sodium chloride flush 0.9 % injection 10 mL, 10 mL, IntraVENous, 2 times per day, Luis Fernando Robison MD, 10 mL at 03/10/23 0801    sodium chloride flush 0.9 % injection 10  mL, 10 mL, IntraVENous, PRN, Dawit Zhou MD    0.9 % sodium chloride infusion, , IntraVENous, PRN, Dawit Zhou MD    promethazine (PHENERGAN) tablet 12.5 mg, 12.5 mg, Oral, Q6H PRN **OR** ondansetron (ZOFRAN) injection 4 mg, 4 mg, IntraVENous, Q6H PRN, Dawit Zhou MD    polyethylene glycol (GLYCOLAX) packet 17 g, 17 g, Oral, Daily PRN, Dawit Zhou MD    acetaminophen (TYLENOL) tablet 650 mg, 650 mg, Oral, Q6H PRN **OR** acetaminophen (TYLENOL) suppository 650 mg, 650 mg, Rectal, Q6H PRN, Dawit Zhou MD    DOPamine (INTROPIN) 800 mg in dextrose 5 % 250 mL infusion, 1-20 mcg/kg/min, IntraVENous, Continuous, La Joy APRN - CNP, Last Rate: 14.2 mL/hr at 03/10/23 0645, 7.5 mcg/kg/min at 03/10/23 0645    etomidate (AMIDATE) injection 20 mg, 20 mg, IntraVENous, Once, RASHARD Solis - CNP    propofol injection, 5-50 mcg/kg/min, IntraVENous, Titrated, La Joy APRN - CNP, Last Rate: 20.5 mL/hr at 03/10/23 0646, 35 mcg/kg/min at 03/10/23 0646    fentaNYL 10 mcg/ml in 0.9%  ml infusion,  mcg/hr, IntraVENous, Continuous, Last Rate: 7.5 mL/hr at 03/10/23 0645, 75 mcg/hr at 03/10/23 0645 **AND** fentaNYL bolus from bag, 50 mcg, IntraVENous, Q30 Min PRN, La Joy APRN - CNP    budesonide (PULMICORT) nebulizer suspension 500 mcg, 0.5 mg, Nebulization, BID, La Rebel, APRN - CNP, 500 mcg at 03/10/23 0719    albuterol (PROVENTIL) nebulizer solution 2.5 mg, 2.5 mg, Nebulization, Q4H WA, 2.5 mg at 03/10/23 0719 **AND** ipratropium (ATROVENT) 0.02 % nebulizer solution 0.5 mg, 0.5 mg, Nebulization, Q4H WA, RASHARD Solis - CNP, 0.5 mg at 03/10/23 0719    cefepime (MAXIPIME) 1,000 mg in sodium chloride 0.9 % 50 mL IVPB ([id number]), 1,000 mg, IntraVENous, Q12H, Cristina Baum MD    linezolid (ZYVOX) tablet 600 mg, 600 mg, Oral, 2 times per day, Cristina Baum MD  ALLERGIES     Ciprofloxacin, Codeine, and Digoxin and related  Immunization History   Administered Date(s) Administered    COVID-19, PFIZER GRAY top, DO NOT Dilute, (age 15 y+), IM, 30 mcg/0.3 mL 05/24/2022    Influenza Vaccine, unspecified formulation 10/15/2014    Influenza Virus Vaccine 10/31/2008, 10/05/2011    Influenza, High Dose (Fluzone 65 yrs and older) 09/22/2015, 11/21/2016, 09/19/2017, 09/25/2019    Influenza, Triv, inactivated, subunit, adjuvanted, IM (Fluad 65 yrs and older) 01/03/2019    Pneumococcal Conjugate 13-valent ([id number]) 05/27/2016    Pneumococcal Polysaccharide (Bfplexxim47) 12/21/2012, 01/29/2018    Td, unspecified formulation 03/11/2014    Tdap (Boostrix, Adacel) 09/30/2015    Zoster Live (Zostavax) 06/25/2013    Zoster Recombinant (Shingrix) 12/10/2019      Internal Administration   First Dose      Second Dose COVID-19, PFIZER GRAY top, DO NOT Dilute, (age 15 y+), IM, 30 mcg/0.3 mL   05/24/2022       Last COVID Lab SARS-CoV-2, PCR (no units)   Date Value   10/05/2022 Not Detected     SARS-CoV-2 RNA, RT PCR (no units)   Date Value   05/11/2022 NOT DETECTED     SARS-CoV-2, NAAT (no units)   Date Value   03/10/2023 Not Detected          PAST MEDICAL HISTORY     Past Medical History:   Diagnosis Date    A-fib [facility])     Acute on chronic congestive heart failure (HCC)     Anxiety     Asthma     CAD (coronary artery disease) 01/21/2016    Cancer ([address]  breast ca 2006    right lumpectomy    Chronic kidney disease     nephrolithiasis    COPD exacerbation ([address] 10/12/2022    Depression     Diabetes mellitus ([address]     H/O mammogram     Hx MRSA infection     toe infection january 2012    Hyperlipidemia     Hypertension     Lateral epicondylitis     Morbid obesity ([address]     SERENA on CPAP     Oxygen dependent     Parkinson's disease ([address]     Tubal ligation status      SURGICAL HISTORY       Past Surgical History:   Procedure Laterality Date    BREAST LUMPECTOMY      BREAST REDUCTION SURGERY      CARDIAC CATHETERIZATION  4/28/2014    Dr. Wes Linn  01/11/2022    Dr. Alfie Lindsey 7/29/15    CT GUIDED CHEST TUBE  7/8/2022    CT GUIDED CHEST TUBE 7/8/2022 Amarilis Ruiz MD [facility]    ENDOSCOPY, COLON, DIAGNOSTIC  7/19/15    GALLBLADDER SURGERY      IR PERC CATH PLEURAL DRAIN W/IMAG  2/14/2023    IR PERC CATH PLEURAL DRAIN W/IMAG 2/14/2023 [facility]    IR REMOVAL OF TUNNELED PLEURAL CATH W CUFF  2/23/2023    IR REMOVAL OF TUNNELED PLEURAL CATH W CUFF 2/23/2023 [facility]    LUMBAR LAMINECTOMY      TOE AMPUTATION      TONSILLECTOMY      UPPER GASTROINTESTINAL ENDOSCOPY      UPPER GASTROINTESTINAL ENDOSCOPY N/A 7/19/2022    EGD ESOPHAGOGASTRODUODENOSCOPY performed by Rita Jordan MD at [address] HISTORY       Family History   Problem Relation Age of Onset    Cancer Mother         Lung Ca    Cancer Father         lung Ca    Diabetes Sister     Heart Disease Sister     Seizures Son     Other Brother         sepsis     SOCIAL HISTORY       Social History     Socioeconomic History    Marital status:       Spouse name: None    Number of children: None    Years of education: None    Highest education level: None   Tobacco Use    Smoking status: Never    Smokeless tobacco: Never   Vaping Use    Vaping Use: Never used   Substance and Sexual Activity    Alcohol use: No    Drug use: No    Sexual activity: Never     PHYSICAL EXAM        Vitals:    Vitals:    03/10/23 0600 03/10/23 0700 03/10/23 0737 03/10/23 0745   BP: (!) 158/81 (!) 162/81     Pulse: 74 60 (!) 47 68   Resp:       Temp:       TempSrc:       SpO2: 100% 100% 100% 100%   Weight:            CONSTITUTIONAL:   no apparent distress, and appears stated age  ENT:  Normocephalic, without obvious abnormality, atraumatic, line, no adenopathy, thyroid symmetric, not enlarged and no tenderness, skin normal  LUNGS:  No increased work of breathing, good air exchange, clear to auscultation bilaterally, no crackles or wheezing VENT  CARDIOVASCULAR:  Normal apical impulse, regular rate and rhythm, normal S1 and S2,   ABDOMEN:  No scars, normal bowel sounds, soft,  distended, non-tender, no masses palpated  PANNUS EASH   CHEST/BREASTS:  Breasts symmetrical   GENITAL/URINARY:  BIGGS YELLOW   MUSCULOSKELETAL:  There is no redness, warmth, or swelling of the joints. NO WOUNDS   NEUROLOGIC:  SEDATED VENT   SKIN:  no bruising or bleeding and normal skin color, texture, turgor          Central Venous Catheter:  CVC Triple Lumen 03/10/23 Right Femoral (Active)   Central Line Being Utilized Yes 03/10/23 0400   Criteria for Appropriate Use Hemodynamically unstable, requiring monitoring lines, vasopressors, or volume resuscitation 03/10/23 0400   Site Assessment Clean, dry & intact 03/10/23 0400   Phlebitis Assessment No symptoms 03/10/23 0400   Infiltration Assessment 0 03/10/23 0400   Color/Movement/Sensation Capillary refill less than 3 sec 03/10/23 0400   Proximal Lumen Color/Status Blue; Infusing;Flushed;Brisk blood return 03/10/23 0400   Medial Lumen Status White; Infiltrated; Flushed;Brisk blood return 03/10/23 0400   Distal Lumen Color/Status Red; Infusing;Flushed;Brisk blood return 03/10/23 0400   [address] checked and tightened 03/10/23 0400   Dressing Type Transparent; Antimicrobial 03/10/23 0400   Dressing Status Clean, dry & intact 03/10/23 0400   Dressing Intervention New 03/10/23 0400           Peripheral Intravenous Line:  Peripheral IV 03/10/23 Left Antecubital (Active)   Site Assessment Clean;Dry; Intact 03/10/23 0400   Line Status Normal saline locked; Flushed;Brisk blood return 03/10/23 0400   Line Care Connections checked and tightened 03/10/23 0400   Phlebitis Assessment No symptoms 03/10/23 0400   Infiltration Assessment 0 03/10/23 0400   Alcohol Cap Used No 03/10/23 0400   Dressing Status Dry; Intact 03/10/23 0400   Dressing Type Transparent 03/10/23 0400   Dressing Intervention New 03/10/23 0400        Drain(s):  NG/OG/NJ/NE Tube Nasogastric 14 fr Left nostril (Active)       Urinary Catheter 03/10/23 Diaz-Temperature (Active)   $ Urethral catheter insertion $ Not inserted for procedure 03/10/23 0150   Urine Color Straw 03/10/23 0150   Urine Appearance Mucous 03/10/23 0150   Collection Container Standard 03/10/23 0150   Output (mL) 30 mL 03/10/23 0646     Airway:  ETT  (Active)   $ Intubation Emergent  $ Yes 03/10/23 0505   Secured At 23 cm 03/10/23 0505   Measured From Lips 03/10/23 0505   ETT Placement Right 03/10/23 0505   Secured By Commercial tube goldberg 03/10/23 0505        DIAGNOSTIC RESULTS   RADIOLOGY:   IR FLUORO GUIDED CVA DEVICE PLMT/REPLACE/REMOVAL    Result Date: 2/9/2023  PROCEDURE: IR FLUOROSCOPY GUIDED CENTRAL VENOUS ACCESS DEVICE PLACEMENT; [address] [place] [address] 2/9/2023 HISTORY: ORDERING SYSTEM PROVIDED HISTORY: Tunneled HD line along with separate iv access TECHNOLOGIST PROVIDED HISTORY: Reason for exam:->Tunneled HD line along with separate iv access TECHNIQUE: Ultrasound and fluoroscopic guided CONTRAST: None SEDATION: Moderate sedation was ordered and supervised by the attending with physician face-to-face monitoring. Medications were provided and recorded by Radiology nurses. The administration, documentation and monitoring of IV conscious sedation under my direct supervision for time frame of 20 minutes. 50 mcg of IV fentanyl was administered. Interventional radiology nursing staff monitored the patient the throughout the exam. FLUOROSCOPY DOSE AND TYPE: Radiation Exposure Indices: Fluoroscopy time equals 0.3 minutes. Total dose equals 3.29 mGy DESCRIPTION OF PROCEDURE: Informed consent was obtained after a detailed explanation of the procedure including risks, benefits, and alternatives. Universal protocol was observed. Sterile gowns, masks, hats and gloves utilized for maximal sterile barrier. FINDINGS: The patient's neck was prepped and draped in a sterile fashion using maximum sterile barrier technique.  Ultrasound images demonstrate a patent right internal jugular vein. Following the uneventful administration of lidocaine using ultrasound guidance I introduced a micro puncture needle into the right internal jugular vein. Through the micro needle a microwire was advanced. Over the microwire a micro sheath was placed. Through the micro sheath an Amplatz wire was advanced into the superior vena cava. 2 dilators were used to dilate a tract into the right internal jugular vein. I then anesthetized a tract along the chest wall measuring 10 cm. Using a blunt tunneling device I tunneled the catheter to the dilator within the right internal jugular vein. Over the Amplatz wire peel-away sheath was placed. Through the peel-away sheath the dual lumen 15 American 28 cm dialysis catheter was placed. The catheter tip is positioned at the SVC right atrial junction. The patient tolerated the procedure well and there were no complications. The catheter was then secured to the skin with 2-0 silk suture. The incision overlying the right internal jugular vein was also sutured with 2-0 silk suture. Successful placement of a tunneled dialysis catheter via the right internal jugular vein. Administration of conscious sedation. XR CHEST PORTABLE    Result Date: 3/10/2023  EXAMINATION: ONE XRAY VIEW OF THE CHEST; ONE SUPINE XRAY VIEW(S) OF THE ABDOMEN 3/10/2023 5:49 am COMPARISON: 4 hours prior. HISTORY: ORDERING SYSTEM PROVIDED HISTORY: ett placement TECHNOLOGIST PROVIDED HISTORY: Reason for exam:->ett placement; ORDERING SYSTEM PROVIDED HISTORY: Confirmation of course of NG/OG/NE tube and location of tip of tube TECHNOLOGIST PROVIDED HISTORY: Reason for exam:->Confirmation of course of NG/OG/NE tube and location of tip of tube Portable? ->Yes FINDINGS: Endotracheal tube tip with good positioning 3.7 cm above the juan francisco. Gastric tube with good position, proximal side hole beneath the hemidiaphragms.  Right jugular hemodialysis catheter remains with good positioning. Obscuration of the right cardiac base and right hemidiaphragm. Improving aeration right base. Small right pleural effusion noted. No pneumothorax. Body wall soft tissues unremarkable. Osseous thorax intact. 1. Endotracheal tube and gastric tube with good positioning. 2. Right jugular hemodialysis catheter remains with good positioning. 3. Improved aeration of the right base with small right pleural effusion now evident. RECOMMENDATION: Careful clinical correlation and follow up recommended. XR CHEST PORTABLE    Result Date: 3/10/2023  EXAMINATION: ONE XRAY VIEW OF THE CHEST 3/9/2023 11:39 pm COMPARISON: Chest one view, 02/23/2023 HISTORY: ORDERING SYSTEM PROVIDED HISTORY: hypotension TECHNOLOGIST PROVIDED HISTORY: Reason for exam:->hypotension FINDINGS: Lines, tubes, and devices: There is stable position of right internal jugular tip localized at the atriocaval junction. Lungs and pleura: There is mild pulmonary vascular congestion and persistent small right pleural effusion. There is interval worsening atelectasis in the right middle lobe and right upper lobe. Right lower lobe atelectasis and/or pneumonitis persists. No pneumothorax. Cardiomediastinal silhouette: The mediastinum is stable. The heart size is normal. Bones and soft tissues: There are surgical clips in right axilla. Surgical clips are superimposed over right upper quadrant. Mild pulmonary vascular congestion. Interval worsening of right upper lobe and right middle lobe atelectasis. Persistent right lower lobe atelectasis and/or pneumonitis. Small right pleural effusion. XR CHEST PORTABLE    Result Date: 2/23/2023  EXAMINATION: ONE XRAY VIEW OF THE CHEST 2/23/2023 3:58 pm COMPARISON: February 23, 2023 HISTORY: ORDERING SYSTEM PROVIDED HISTORY: post procedure TECHNOLOGIST PROVIDED HISTORY: Reason for exam:->post procedure FINDINGS: Cardiomegaly. Lungs clear with mild right basilar atelectasis.   No pneumothorax or effusion. Osseous thorax intact. Right jugular hemodialysis catheter tip remains well positioned at the cavoatrial junction. No acute disease. RECOMMENDATION: Careful clinical correlation and follow up recommended. XR CHEST PORTABLE    Result Date: 2/22/2023  EXAMINATION: ONE XRAY VIEW OF THE CHEST 2/22/2023 2:36 pm COMPARISON: 10/16/2023. HISTORY: ORDERING SYSTEM PROVIDED HISTORY: pleural effusion evaluation TECHNOLOGIST PROVIDED HISTORY: Reason for exam:->pleural effusion evaluation FINDINGS: The lungs are without acute focal process. There is no effusion or pneumothorax. Cardiomegaly. The osseous structures are without acute process. Right-sided central line catheter at the caval atrial junction. No sizable pleural effusion. XR CHEST PORTABLE    Result Date: 2/16/2023  EXAMINATION: ONE XRAY VIEW OF THE CHEST 2/16/2023 6:33 am COMPARISON: 02/15/2023 HISTORY: ORDERING SYSTEM PROVIDED HISTORY: F/U pleural effusion TECHNOLOGIST PROVIDED HISTORY: Reason for exam:->F/U pleural effusion FINDINGS: Moderate cardiomegaly is unchanged. There is mild pulmonary venous congestion. Right IJ dialysis catheter is stable. Prior noted infiltrate versus atelectasis at the left lung base has resolved. There is improved aeration the right upper lobe with minimal residual apical density. No pneumothorax is identified. Bony structures are unremarkable. Improvement in bilateral lung infiltrates as described. XR CHEST PORTABLE    Result Date: 2/15/2023  EXAMINATION: ONE XRAY VIEW OF THE CHEST 2/15/2023 5:28 am COMPARISON: 14 February 2023 HISTORY: ORDERING SYSTEM PROVIDED HISTORY: SOB TECHNOLOGIST PROVIDED HISTORY: Reason for exam:->SOB FINDINGS: Unchanged right-sided central venous catheter. A right pleural effusion appears diminished. There is now new opacity at the right upper lobe which could be infiltrate and or atelectasis.   There is unchanged infiltrate and or atelectasis at the left lower lobe is well. Diminished right pleural effusion. New infiltrate and or atelectasis at the right upper lobe. Stable infiltrate and or atelectasis at the left lower lobe. XR CHEST PORTABLE    Result Date: 2/9/2023  EXAMINATION: ONE XRAY VIEW OF THE CHEST 2/9/2023 5:35 am COMPARISON: 7 February 2023 HISTORY: ORDERING SYSTEM PROVIDED HISTORY: SOB TECHNOLOGIST PROVIDED HISTORY: Reason for exam:->SOB FINDINGS: Unchanged large right pleural effusion with likely compressive atelectasis adjacent to it. There is likely atelectasis at the left lower lobe causing partial obscuration of the left hemidiaphragm medially. This does not appear clearly changed. Large right pleural effusion with likely adjacent compressive atelectasis. Suspected atelectasis at the posterior basal segment of the left lower lobe. XR CHEST 1 VIEW    Result Date: 2/14/2023  EXAMINATION: ONE XRAY VIEW OF THE CHEST 2/14/2023 3:28 pm COMPARISON: 02/09/2023 HISTORY: ORDERING SYSTEM PROVIDED HISTORY: Post Chest Tube Placement TECHNOLOGIST PROVIDED HISTORY: Reason for exam:->Post Chest Tube Placement FINDINGS: There is cardiomegaly. Large pleural effusion with infiltrates and atelectasis in the right lung is identified with small amount of ventilated right upper lobe. There is infiltrates and effusions left base as well. A right chest tube is noted in the right lung base which may be partially kinked. There is a right IJ dialysis catheter. Stable abnormal chest with slightly improved ventilation of the right lung with persistent large right pleural effusion with infiltrates and atelectasis in the right lung base and to a lesser extent in the left base. Right chest tube which may be partially kinked. There is no pneumothorax. XR ABDOMEN FOR NG/OG/NE TUBE PLACEMENT    Result Date: 3/10/2023  EXAMINATION: ONE XRAY VIEW OF THE CHEST; ONE SUPINE XRAY VIEW(S) OF THE ABDOMEN 3/10/2023 5:49 am COMPARISON: 4 hours prior.  HISTORY: ORDERING SYSTEM PROVIDED HISTORY: ett placement TECHNOLOGIST PROVIDED HISTORY: Reason for exam:->ett placement; ORDERING SYSTEM PROVIDED HISTORY: Confirmation of course of NG/OG/NE tube and location of tip of tube TECHNOLOGIST PROVIDED HISTORY: Reason for exam:->Confirmation of course of NG/OG/NE tube and location of tip of tube Portable? ->Yes FINDINGS: Endotracheal tube tip with good positioning 3.7 cm above the juan francisco. Gastric tube with good position, proximal side hole beneath the hemidiaphragms. Right jugular hemodialysis catheter remains with good positioning. Obscuration of the right cardiac base and right hemidiaphragm. Improving aeration right base. Small right pleural effusion noted. No pneumothorax. Body wall soft tissues unremarkable. Osseous thorax intact. 1. Endotracheal tube and gastric tube with good positioning. 2. Right jugular hemodialysis catheter remains with good positioning. 3. Improved aeration of the right base with small right pleural effusion now evident. RECOMMENDATION: Careful clinical correlation and follow up recommended. IR ULTRASOUND GUIDANCE VASCULAR ACCESS    Result Date: 2/9/2023  PROCEDURE: IR FLUOROSCOPY GUIDED CENTRAL VENOUS ACCESS DEVICE PLACEMENT; US GUIDED VASCULAR ACCESS MODERATE CONSCIOUS SEDATION 2/9/2023 HISTORY: ORDERING SYSTEM PROVIDED HISTORY: Tunneled HD line along with separate iv access TECHNOLOGIST PROVIDED HISTORY: Reason for exam:->Tunneled HD line along with separate iv access TECHNIQUE: Ultrasound and fluoroscopic guided CONTRAST: None SEDATION: Moderate sedation was ordered and supervised by the attending with physician face-to-face monitoring. Medications were provided and recorded by Radiology nurses. The administration, documentation and monitoring of IV conscious sedation under my direct supervision for time frame of 20 minutes. 50 mcg of IV fentanyl was administered.   Interventional radiology nursing staff monitored the patient the throughout the exam. FLUOROSCOPY DOSE AND TYPE: Radiation Exposure Indices: Fluoroscopy time equals 0.3 minutes. Total dose equals 3.29 mGy DESCRIPTION OF PROCEDURE: Informed consent was obtained after a detailed explanation of the procedure including risks, benefits, and alternatives. Universal protocol was observed. Sterile gowns, masks, hats and gloves utilized for maximal sterile barrier. FINDINGS: The patient's neck was prepped and draped in a sterile fashion using maximum sterile barrier technique. Ultrasound images demonstrate a patent right internal jugular vein. Following the uneventful administration of lidocaine using ultrasound guidance I introduced a micro puncture needle into the right internal jugular vein. Through the micro needle a microwire was advanced. Over the microwire a micro sheath was placed. Through the micro sheath an Amplatz wire was advanced into the superior vena cava. 2 dilators were used to dilate a tract into the right internal jugular vein. I then anesthetized a tract along the chest wall measuring 10 cm. Using a blunt tunneling device I tunneled the catheter to the dilator within the right internal jugular vein. Over the Amplatz wire peel-away sheath was placed. Through the peel-away sheath the dual lumen 15 [place] 28 cm dialysis catheter was placed. The catheter tip is positioned at the SVC right atrial junction. The patient tolerated the procedure well and there were no complications. The catheter was then secured to the skin with 2-0 silk suture. The incision overlying the right internal jugular vein was also sutured with 2-0 silk suture. Successful placement of a tunneled dialysis catheter via the right internal jugular vein. Administration of conscious sedation.      IR GUIDED THORACENTESIS PLEURAL    Result Date: 2/14/2023  PROCEDURE: Yunior Regan RIGHT THORACENTESIS 2/14/2023 HISTORY: ORDERING SYSTEM PROVIDED HISTORY: Pleural Effusion TECHNOLOGIST PROVIDED HISTORY: Reason for exam:->Pleural Effusion Which side should the procedure be performed?->Right TECHNIQUE: Informed consent was obtained after a detailed explanation of the procedure including risks, benefits, and alternatives. Universal protocol was performed. The right chest was prepped and draped in sterile fashion and local anesthesia was achieved with lidocaine. An 8 Syriac needle sheath was advanced under ultrasound guidance into pleural effusion and thoracentesis was performed. The patient tolerated the procedure well. FINDINGS: A total of 1000 cc was removed. Successful ultrasound guided thoracentesis. IR GUIDED PERC PLEURAL DRAIN W CATH INSERT    Result Date: 2/14/2023  PROCEDURE: IR PERC CATH PLEURAL DRAIN W/IMAG 2/14/2023 HISTORY: ORDERING SYSTEM PROVIDED HISTORY: Recurrent pleural effusion TECHNOLOGIST PROVIDED HISTORY: Reason for exam:->Recurrent pleural effusion Which side should the procedure be performed?->Right TECHNIQUE: Ultrasound-guided CONTRAST: None SEDATION: None FLUOROSCOPY DOSE AND TYPE: None DESCRIPTION OF PROCEDURE: Informed consent was obtained after a detailed explanation of the procedure including risks, benefits, and alternatives. Universal protocol was observed. Sterile gowns, masks, hats and gloves utilized for maximal sterile barrier. FINDINGS: Ultrasound images of the right pleural space were obtained Note is made of a large right pleural effusion The patient has right back was prepped and draped in a sterile fashion maximum sterile barrier technique. Following the uneventful administration of lidocaine I introduced a 5 [place] Yueh catheter into the pleural space. I then anesthetized a 10 cm tract along the posterior chest wall. Using a blunt tunneling device I tunneled the PleurX catheter to the Yueh catheter. Through the [facility] catheter an Amplatz wire was advanced into the pleural space.   Over the Amplatz wire multiple dilators were used to dilate a tract into the right pleural space. The PleurX catheter was then introduced into the peel-away sheath and position within the pleural space. The patient tolerated the procedure well and no complications. A sterile dressing was placed over the entry sites. 1. Successful placement of a PleurX catheter within the right pleural space 2. 1000 cc of fluid was aspirated. IR REMOVAL OF TUNNELED PLEURAL CATH W CUFF    Result Date: 2/23/2023  PROCEDURE: IR REMOVAL TUNNELED PLEURAL CATHETER 2/23/2023 HISTORY: ORDERING SYSTEM PROVIDED HISTORY: PleuRX Catheter removal TECHNOLOGIST PROVIDED HISTORY: Reason for exam:->PleuRX Catheter removal Which side should the procedure be performed?->Radiologist Recommendation TECHNIQUE: Surgical removal of the indwelling PleurX catheter CONTRAST: None SEDATION: None FLUOROSCOPY DOSE AND TYPE: None DESCRIPTION OF PROCEDURE: Informed consent was obtained after a detailed explanation of the procedure including risks, benefits, and alternatives. Universal protocol was observed. Sterile gowns, masks, hats and gloves utilized for maximal sterile barrier. FINDINGS: The patient's right side was prepped and draped in a sterile fashion maximum sterile barrier technique. Following the uneventful administration of lidocaine I manual remove the indwelling PleurX catheter. A sterile dressing was placed over the insertion site. Successful removal of the indwelling right PleurX catheter. LABS  Recent Labs     03/10/23  0001 03/10/23  0440   WBC 8.7 11.4   HGB 8.9* 10.1*   HCT 35.5 40.1   MCV 98.3 97.3    247     Recent Labs     03/10/23  0001 03/10/23  0440    138   K 4.0 4.2   CL 99 98   CO2 31* 32*   BUN 16 17   CREATININE 2.9* 2.8*   LABGLOM 17 17   GLUCOSE 85 141*   PROT 6.0*  --    LABALBU 3.8  --    CALCIUM 8.7 9.3   BILITOT 0.5  --    ALKPHOS 81  --    AST 6  --    ALT <5  --      No results for input(s): PROCAL in the last 72 hours.   Lab Results   Component Value Date    CRP 5.0 (H) 07/06/2022 CRP 0.7 (H) 10/25/2021    CRP 13.5 (H) 02/03/2021     Lab Results   Component Value Date    SEDRATE 6 07/06/2022    SEDRATE 15 10/24/2021    SEDRATE 115 (H) 02/03/2021     No results found for: [id number]  Lab Results   Component Value Date/Time    ADVIRPCR Not Detected 10/05/2022 11:19 AM    BPARAPPCR Not Detected 10/05/2022 11:19 AM    BPPTXPPCR Not Detected 10/05/2022 11:19 AM    CHLPNEPCR Not Detected 10/05/2022 11:19 AM    YXH877WXTO Not Detected 10/05/2022 11:19 AM    YGHPVY8EHF Not Detected 10/05/2022 11:19 AM    VXUCE18ZXZ Not Detected 10/05/2022 11:19 AM    AKFQH47CLI Not Detected 10/05/2022 11:19 AM    COVID19 Not Detected 03/10/2023 12:01 AM    COVID19 Not Detected 10/05/2022 11:19 AM    METAPNEPCR Not Detected 10/05/2022 11:19 AM    RHENTPCR Not Detected 10/05/2022 11:19 AM    FLUBPCR Not Detected 01/17/2023 05:23 PM    MYCPNEPCR Not Detected 10/05/2022 11:19 AM    ILYCMGW1BII Not Detected 10/05/2022 11:19 AM    WATUGEN1LUB Not Detected 10/05/2022 11:19 AM    SEREHEH0THJ Not Detected 10/05/2022 11:19 AM    VIWZUMD0IFU Not Detected 10/05/2022 11:19 AM    RSVPCR Not Detected 10/05/2022 11:19 AM        Lab Results   Component Value Date/Time    COVID19 Not Detected 03/10/2023 12:01 AM    COVID19 Not Detected 10/05/2022 11:19 AM     COVID-19/SHERITA-COV2 LABS  Recent Labs     03/10/23  0001   INR 2.5   PROTIME 29.2*   AST 6   ALT <5     Lab Results   Component Value Date/Time    CHOL 95 05/12/2022 01:44 AM    TRIG 93 05/12/2022 01:44 AM    HDL 38 05/12/2022 01:44 AM    LDLCALC 38 05/12/2022 01:44 AM    LABVLDL 19 05/12/2022 01:44 AM         Lab Results   Component Value Date    HEPAIGM Non-Reactive 02/21/2023    HEPBIGM Non-Reactive 02/21/2023    HCVABI Non-Reactive 02/21/2023     Hep C Ab Interp   Date Value Ref Range Status   02/21/2023 Non-Reactive Non-Reactive Final     Comment:     Testing performed: [employer] [facility], [facility] [place]            MICROBIOLOGY:          WOUND/ABSCESS Date Value Ref Range Status   01/17/2023 Light growth  Final   07/05/2022 Heavy growth  Final   07/05/2022 Heavy growth  Final       Smear, Respiratory   Date Value Ref Range Status   07/06/2022 Refer to ordered Gram stain for results  Final         Component Value Date/Time    AFBCX No growth after 6 weeks of incubation. 10/18/2022 1106    AFBCX No growth after 6 weeks of incubation. 07/08/2022 1546    FUNGSM No Fungal elements seen 07/08/2022 1546    LABFUNG No growth after 4 weeks of incubation.  07/08/2022 1546     CULTURE, RESPIRATORY   Date Value Ref Range Status   07/06/2022 Oral Pharyngeal Shavon absent (A)  Final   07/06/2022 Light growth  Final     Culture Catheter Tip   Date Value Ref Range Status   07/12/2022 Growth not present  Final     Body Fluid Culture, Sterile   Date Value Ref Range Status   02/01/2023 Growth not present  Final     Culture Surgical   Date Value Ref Range Status   02/05/2021 Moderate growth  Final     Creatinine Ur POCT   Date Value Ref Range Status   06/17/2019 50 mg/dl  Final   05/08/2018 50  Final   11/21/2016 50 mg / dL  Final     Comment:     A : C >300 mg / g High Abnormal     Urine Culture, Routine   Date Value Ref Range Status   01/18/2023   Final    >100,000 CFU/ml  Vancomycin resistant Enterococci isolated     10/05/2022 >100,000 CFU/ml  Final   07/05/2022 Growth not present  Final     MRSA Culture Only   Date Value Ref Range Status   01/17/2023 Methicillin resistant Staph aureus not isolated  Final   07/06/2022 Methicillin resistant Staph aureus not isolated  Final          FINAL IMPRESSION    Patient is a 67 y.o. female who presented with   Chief Complaint   Patient presents with    Hypotension    and admitted for Myxedema coma ([facility] [E03.5]  Elevated troponin [R77.8]  Acute respiratory failure with hypercapnia (HCC) [J96.02]  Chronic renal failure, stage 4 (severe) (HCC) [N18.4]  Hypotension, unspecified hypotension type [I95.9]  Acute on chronic congestive heart failure, unspecified heart failure type ([facility] [I50.9]  Pt with respiratory failure   Right pleural effusion h/o + gram stain from pleural fluid  For thoracentesis today CHECK CX  Kidney failure recently started on hemo   LEFT LE HEALED   CONT ATBX      cefepime (MAXIPIME) 1,000 mg in sodium chloride 0.9 % 50 mL IVPB ([id number]), Q12H  linezolid (ZYVOX) tablet 600 mg, 2 times per day        Available labs, imaging studies, microbiologic studies have been reviewed. The patient/FAMILY  was educated about the diagnosis, prognosis, indications, risks and benefits of treatment. An opportunity to ask questions was given to the patient/FAMILY and questions were answered. Thank you for involving me in the care of Andrea Rodriguez. Please do not hesitate to call [phone]  for any questions or concerns.          Electronically signed by Odalys Sheppard MD on 3/10/2023 at 9:42 AM

## 2023-03-10 NOTE — FLOWSHEET NOTE
03/10/23 1430   Vital Signs   /62   Temp 98.1 °F (36.7 °C)   Heart Rate 74   SpO2 98 %   Weight 210 lb 12.2 oz (95.6 kg)   Weight Method Bed scale   Percent Weight Change -2.45   Post-Hemodialysis Assessment   Post-Treatment Procedures Blood returned;Catheter capped, clamped and heparinized x 2 ports   Machine Disinfection Process Acid/Vinegar Clean;Machine Absence of Bleach Machine; Exterior Machine Disinfection   Blood Volume Processed (Liters) 79.8 l/min   Dialyzer Clearance Lightly streaked   Hemodialysis Intake (ml) 400 ml   Hemodialysis Output (ml) 2800 ml   NET Removed (ml) 2400   Tolerated Treatment Good   Patient Response to Treatment tolerated well vss removed 2.4L hd cath dressing changed no signs of infection   Bilateral Breath Sounds Diminished   Edema Generalized   Time Off 1430   Patient Disposition Remain in ICU/ED

## 2023-03-10 NOTE — PROGRESS NOTES
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 Please reconsult if needed.     Thank you,  Stoney Mcdonough, PharmD, BCPS 3/10/2023 9:16 AM

## 2023-03-10 NOTE — CONSULTS
Assessment:  Myxedema coma  Acute on chronic hypercapnic respiratory failure  Acute on chronic congestive heart failure-EF 65% January 18, 2023  Urinary tract infection hx VRE  ESRD, still makes urine  Pleural effusion possible healthcare associated pneumonia  Chronic renal failure, stage IV requiring hemodialysis  Elevated troponin (chronic)  Hypotension  Bradycardia    Plan:   Vent support  Cont synthroid, steroids  Dopamine, wean as tolerated goal map>65  trend lactic acid  Obtain CT scan of the chest/IR thoracentesis right  Continue empiric antibiotic coverage with Rocephin  Hemodialysis per nephrology  Blood cultures pending  Check urine culture  DVT prophylaxis with heparin      History of Present Illness:   Patient is a 74-year-old female with a past medical history of acute on chronic congestive heart failure, atrial fibrillation on eliquis, asthma, coronary artery disease, chronic kidney disease requiring hemodialysis, diabetes mellitus, hyperlipidemia, Parkinson's disease, and obstructive sleep apnea. She was sent into the emergency department from the nursing home today for bradycardia, hypotension, general fatigue and weakness for the last couple days. She did receive dialysis yesterday. ED evaluation showed the patient to be hypotensive and bradycardic. Her proBNP greater than 70,000, elevated troponin of 169 but this was elevated during her last hospitalization. Elevated TSH at 115.3 and a low T4 1.2. Urinalysis was positive for urinary tract infection. Arterial blood gases showed a pH of 7.13, PCO2 of 94.3, PO2 81.9, and HCO3 of 32.1. She was placed on BiPAP. She was given 1 dose of Rocephin for urinary tract infection, 1 dose of atropine for bradycardia and dopamine infusion started. She also received 50 mcg of Synthroid.         March 10: intubated this am, on dopamine, awakes off sedation, wean fio2 50% as toerlated, HD per nephro, cont abx, steroids, synthroid, SBT when stable in am    Past Medical History:  Past Medical History:   Diagnosis Date    [facility] ([facility]     Acute on chronic congestive heart failure (HCC)     Anxiety     Asthma     CAD (coronary artery disease) 01/21/2016    Cancer ([facility]  breast ca 2006    right lumpectomy    Chronic kidney disease     nephrolithiasis    COPD exacerbation ([facility] 10/12/2022    Depression     Diabetes mellitus ([facility]     H/O mammogram     Hx MRSA infection     toe infection january 2012    Hyperlipidemia     Hypertension     Lateral epicondylitis     Morbid obesity (HCC)     SERENA on CPAP     Oxygen dependent     Parkinson's disease ([facility]     Tubal ligation status       Past Surgical History:   Procedure Laterality Date    BREAST LUMPECTOMY      BREAST REDUCTION SURGERY      CARDIAC CATHETERIZATION  4/28/2014    Dr. Mariano Betancourt  01/11/2022    Dr. Nik Shaver  7/29/15    CT GUIDED CHEST TUBE  7/8/2022    CT GUIDED CHEST TUBE 7/8/2022 MD HALLEY Martinez CT    ENDOSCOPY, COLON, DIAGNOSTIC  7/19/15    GALLBLADDER SURGERY      IR PERC CATH PLEURAL DRAIN W/IMAG  2/14/2023    IR PERC CATH PLEURAL DRAIN W/IMAG 2/14/2023 [facility]    IR REMOVAL OF TUNNELED PLEURAL CATH W CUFF  2/23/2023    IR REMOVAL OF TUNNELED PLEURAL CATH W CUFF 2/23/2023 [facility]    LUMBAR LAMINECTOMY      TOE AMPUTATION      TONSILLECTOMY      UPPER GASTROINTESTINAL ENDOSCOPY      UPPER GASTROINTESTINAL ENDOSCOPY N/A 7/19/2022    EGD ESOPHAGOGASTRODUODENOSCOPY performed by Scottie Jade MD at [facility] ENDOSCOPY       Family History:   Unable to obtain    Allergies:         Ciprofloxacin, Codeine, and Digoxin and related    Social history:  Social History     Socioeconomic History    Marital status:       Spouse name: Not on file    Number of children: Not on file    Years of education: Not on file    Highest education level: Not on file   Occupational History    Not on file   Tobacco Use    Smoking status: Never    Smokeless tobacco: Never   Vaping Use    Vaping Use: Never used   Substance and Sexual Activity    Alcohol use: No    Drug use: No    Sexual activity: Never   Other Topics Concern    Not on file   Social History Narrative    Not on file     Social Determinants of Health     Financial Resource Strain: Not on file   Food Insecurity: Not on file   Transportation Needs: Not on file   Physical Activity: Not on file   Stress: Not on file   Social Connections: Not on file   Intimate Partner Violence: Not on file   Housing Stability: Not on file       Current Medications:     Current Facility-Administered Medications:     hydrocortisone sodium succinate PF (SOLU-CORTEF) injection 100 mg, 100 mg, IntraVENous, Q8H, Tricia Botello APRN - CNP, 100 mg at 03/10/23 0805    levothyroxine (SYNTHROID) injection 100 mcg, 100 mcg, IntraVENous, Daily, RASHARD Sky - CNP, 100 mcg at 03/10/23 0805    heparin (porcine) injection 5,000 Units, 5,000 Units, SubCUTAneous, 3 times per day, RASHARD Sky - CNP, 5,000 Units at 03/10/23 0641    sodium chloride flush 0.9 % injection 10 mL, 10 mL, IntraVENous, 2 times per day, Harsh Rodriguez MD, 10 mL at 03/10/23 0801    sodium chloride flush 0.9 % injection 10 mL, 10 mL, IntraVENous, PRN, Harsh Rodriguez MD    0.9 % sodium chloride infusion, , IntraVENous, PRN, Harsh Rodriguez MD    promethazine (PHENERGAN) tablet 12.5 mg, 12.5 mg, Oral, Q6H PRN **OR** ondansetron (ZOFRAN) injection 4 mg, 4 mg, IntraVENous, Q6H PRN, Harsh Rodriguez MD    polyethylene glycol (GLYCOLAX) packet 17 g, 17 g, Oral, Daily PRN, Harsh Rodriguez MD    acetaminophen (TYLENOL) tablet 650 mg, 650 mg, Oral, Q6H PRN **OR** acetaminophen (TYLENOL) suppository 650 mg, 650 mg, Rectal, Q6H PRN, Harsh Rodriguez MD    DOPamine (INTROPIN) 800 mg in dextrose 5 % 250 mL infusion, 1-20 mcg/kg/min, IntraVENous, Continuous, RASHARD Sky - CNP, Last Rate: 14.2 mL/hr at 03/10/23 0645, 7.5 mcg/kg/min at 03/10/23 0645 etomidate (AMIDATE) injection 20 mg, 20 mg, IntraVENous, Once, RASHARD Zelaya CNP    propofol injection, 5-50 mcg/kg/min, IntraVENous, Titrated, RASHARD Zelaya CNP, Last Rate: 20.5 mL/hr at 03/10/23 1002, 35 mcg/kg/min at 03/10/23 1002    fentaNYL 10 mcg/ml in 0.9%  ml infusion,  mcg/hr, IntraVENous, Continuous, Last Rate: 7.5 mL/hr at 03/10/23 0645, 75 mcg/hr at 03/10/23 0645 **AND** fentaNYL bolus from bag, 50 mcg, IntraVENous, Q30 Min PRN, RASHARD Zelaya CNP    budesonide (PULMICORT) nebulizer suspension 500 mcg, 0.5 mg, Nebulization, BID, RASHARD Zelaya CNP, 500 mcg at 03/10/23 0719    albuterol (PROVENTIL) nebulizer solution 2.5 mg, 2.5 mg, Nebulization, Q4H WA, 2.5 mg at 03/10/23 1039 **AND** ipratropium (ATROVENT) 0.02 % nebulizer solution 0.5 mg, 0.5 mg, Nebulization, Q4H WA, RASHARD Zelaya CNP, 0.5 mg at 03/10/23 1040    cefepime (MAXIPIME) 1,000 mg in sodium chloride 0.9 % 50 mL IVPB ([id number]), 1,000 mg, IntraVENous, Q12H, Cristina Baum MD    linezolid (ZYVOX) tablet 600 mg, 600 mg, Oral, 2 times per day, Nicholas Romano MD    Review of Systems:   Unable to assess due to lethargy    Physical Exam:   Vital Signs:  /61   Pulse 63   Temp 97.9 °F (36.6 °C)   Resp 18   Wt 216 lb 0.8 oz (98 kg)   SpO2 100%   BMI 42.19 kg/m²     Input/Output:   In: 323 [I.V.:105]  Out: 30     Oxygen requirements: BiPAP 18/8 FiO2 60%  Vent Information  Ventilator ID: [id number]  Vent Mode: AC/VC    General appearance: vented sedated  HEENT: Atraumatic/normocephalic, EOMI, MEGAN, pharynx clear, dry mucosa  Neck: Supple, no jugular venous distension, lymphadenopathy, thyromegaly or carotid bruits  Chest: Diminished breath sounds right greater than left no wheezing, respiratory crackles bilateral bases posteriorly   cardiovascular: Normal S1 , S2, regular rate and rhythm, no murmur, rub or gallop  Abdomen: Normal sounds present, soft, lax with no tenderness, no hepatosplenomegaly, and no masses obese  Extremities: 1+ edema. Pulses are equally present. Skin: cool, dry  Neurologic: moves all 4 extr     Investigations:  Labs, radiological imaging and cardiac work up were reviewed    ICU Staff Physician note of personal involvement in Care  As the attending physician, I certify that I personally reviewed the patient's history and personally examined the patient to confirm the physical findings described above,  And that I reviewed the relevant imaging studies and available reports. I also discussed the differential diagnosis and all of the proposed management plans with the patient and individuals accompanying the patient to this visit. They had the opportunity to ask questions about the proposed management plans and to have those questions answered. This patient has a high probability of sudden, clinically significant deterioration, which requires the highest level of physician preparedness to intervene urgently. I managed/supervised life or organ supporting interventions that required frequent physician assessment. I devoted my full attention to the direct care of this patient for the amount of time indicated below. Time I spent with the family or surrogate(s) is included only if the patient was incapable of providing the necessary information or participating in medical decisions - Time devoted to teaching and to any procedures I billed separately is not included.      CRITICAL CARE TIME:  50 minutes              Electronically signed by Teresita Frias MD on 3/10/2023 at 11:35 AM

## 2023-03-10 NOTE — PROCEDURES
Dena Severin is a 67 y.o. female patient. 1. 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 Acute on chronic congestive heart failure, unspecified heart failure type ([facility]    3. Chronic renal failure, stage 4 (severe) (HCC)    4. Elevated troponin    5. Hypotension, unspecified hypotension type    6. Acute respiratory failure with hypercapnia (HCC)      Past Medical History:   Diagnosis Date    [facility]fib ([facility]     Acute on chronic congestive heart failure (HCC)     Anxiety     Asthma     CAD (coronary artery disease) 01/21/2016    Cancer ([facility]  breast ca 2006    right lumpectomy    Chronic kidney disease     nephrolithiasis    COPD exacerbation ([facility] 10/12/2022    Depression     Diabetes mellitus ([facility]     H/O mammogram     Hx MRSA infection     toe infection january 2012    Hyperlipidemia     Hypertension     Lateral epicondylitis     Morbid obesity (HCC)     SERENA on CPAP     Oxygen dependent     Parkinson's disease ([facility]     Tubal ligation status      Blood pressure (!) 142/76, pulse 75, temperature (!) 95.9 °F (35.5 °C), temperature source Bladder, resp. rate 16, weight 215 lb 6.4 oz (97.7 kg), SpO2 100 %, not currently breastfeeding. Intubation    Date/Time: 3/10/2023 5:06 AM  Performed by: RASHARD Zambrano CNP  Authorized by: RASHARD Zambrano CNP   Consent: The procedure was performed in an emergent situation. Verbal consent not obtained. Written consent not obtained. Risks and benefits: risks, benefits and alternatives were discussed  Consent given by: Rosa. Patient understanding: patient does not state understanding of the procedure being performed (Lethargic)  Patient consent: the patient's understanding of the procedure does not match consent given (Lethargic)  Procedure consent: procedure consent matches procedure scheduled  Relevant documents: relevant documents present and verified  Test results: test results available and properly labeled  Site marked: N/A.   Imaging studies: imaging studies available  Required items: required blood products, implants, devices, and special equipment available  Patient identity confirmed: arm band, provided demographic data and hospital-assigned identification number  Time out: Immediately prior to procedure a \"time out\" was called to verify the correct patient, procedure, equipment, support staff and site/side marked as required. Indications: respiratory failure  Intubation method: direct  Patient status: sedated  Preoxygenation: BVM  Pretreatment medications: midazolam  Paralytic: none  Laryngoscope size: Mac 3  Tube size: 7.5 mm  Tube type: cuffed  Number of attempts: 1  Cricoid pressure: yes  Cords visualized: yes  Post-procedure assessment: chest rise and CO2 detector  Breath sounds: equal  Cuff inflated: yes  ETT to lip: 23 cm  Tube secured with: ETT goldberg and adhesive tape  Chest x-ray interpreted by me.   Chest x-ray findings: endotracheal tube in appropriate position  Patient tolerance: patient tolerated the procedure well with no immediate complications        Sumanth Oleaole, APRN - CNP  3/10/2023

## 2023-03-10 NOTE — ACP (ADVANCE CARE PLANNING)
Advance Care Planning   Healthcare Decision Maker:    Primary Decision Maker: Michelle Chapman Child - [phone]    Primary Decision Maker: Amina Prajapati - Child - [phone]    Primary Decision Maker: Patricia miller - Child - [phone]    Today we documented Decision Maker(s) consistent with Legal Next of Kin hierarchy.

## 2023-03-10 NOTE — ED PROVIDER NOTES
Chief complaint:  Fatigue    HPI history provided by the patient, report  Patient here from nursing home for low heart rate, low blood pressure and general fatigue and weakness for the last day or so. Had dialysis yesterday. Patient denies particular complaints, no chest pain, palpitation or shortness of breath. No abdominal pain. No nausea or vomiting. No back or flank pain. There is no treatment prior to arrival.  Nothing really makes her better or worse. Denies fevers or chills. Review of Systems   Constitutional:  Positive for fatigue. Negative for chills, diaphoresis and fever. HENT:  Negative for congestion and sore throat. Respiratory:  Negative for cough, chest tightness, shortness of breath and wheezing. Cardiovascular:  Negative for chest pain, palpitations and leg swelling. Gastrointestinal:  Negative for abdominal pain, diarrhea, nausea and vomiting. Genitourinary:  Negative for flank pain. Musculoskeletal:  Negative for arthralgias, back pain, neck pain and neck stiffness. Skin:  Negative for rash and wound. Neurological:  Negative for dizziness, seizures, syncope, weakness, light-headedness and headaches. All other systems reviewed and are negative. Physical Exam  Vitals and nursing note reviewed. Constitutional:       General: She is awake. She is not in acute distress. Appearance: She is well-developed. She is not ill-appearing, toxic-appearing or diaphoretic. HENT:      Head: Normocephalic and atraumatic. Comments: No sign of acute head or face injuries  Eyes:      General: No scleral icterus. Extraocular Movements: Extraocular movements intact. Conjunctiva/sclera: Conjunctivae normal.      Pupils: Pupils are equal, round, and reactive to light. Cardiovascular:      Rate and Rhythm: Regular rhythm. Bradycardia present. Heart sounds: Normal heart sounds. No murmur heard.   Pulmonary:      Effort: Pulmonary effort is normal. No respiratory distress. Breath sounds: Normal breath sounds. Decreased air movement present. No stridor or transmitted upper airway sounds. No decreased breath sounds, wheezing, rhonchi or rales. Comments: Generally shallow diminished breath sounds bilaterally with no respiratory distress. Chest:      Chest wall: No tenderness. Abdominal:      General: Bowel sounds are normal. There is no distension. Palpations: Abdomen is soft. Tenderness: There is no abdominal tenderness. There is no right CVA tenderness, left CVA tenderness, guarding or rebound. Musculoskeletal:         General: No swelling, tenderness, deformity or signs of injury. Cervical back: Full passive range of motion without pain, normal range of motion and neck supple. No signs of trauma or rigidity. No spinous process tenderness or muscular tenderness. Normal range of motion. Right lower leg: No edema. Left lower leg: No edema. Comments: Arms and legs are neurovascular intact with no pretibial edema or calf pain. No sign of acute bone or joint injury. Skin:     General: Skin is warm and dry. Coloration: Skin is not cyanotic, jaundiced, mottled or pale. Findings: No bruising, erythema or rash. Neurological:      General: No focal deficit present. Mental Status: She is alert and oriented to person, place, and time. GCS: GCS eye subscore is 4. GCS verbal subscore is 5. GCS motor subscore is 6. Cranial Nerves: No cranial nerve deficit. Coordination: Coordination normal.   Psychiatric:         Behavior: Behavior is cooperative. Procedures   PROCEDURE  3/10/23       Time: 0125    CENTRAL LINE INSERTION  Risks, benefits and alternatives (for applicable procedures below) described. Performed By: Phyllis Talbot DO and EM Attending Physician. Indication: poor peripheral access and centrally administered medications.   Informed consent: Consent unable to be obtained due to patient's condition. .  Procedure: After routine sterile preparation, local anesthesia not performed due to emergent nature of this procedure. A right 3-Lumen 7F Central Venous Catheter was placed by femoral vein approach and secured by standard fashion. Ultrasound Guidance:   used. Number of Attempts: 1  Post-procedure Findings: A post procedural chest x-ray  was not indicated. Patient tolerated the procedure well. MDM     History provided by: The patient and report    Patient with multiple medical conditions from nursing home for low blood pressure and low heart rate and general fatigue and lethargy for the last couple of days. Patient has no particular physical complaints and denies pain anywhere or difficulty breathing or chest pain. Admits to feeling tired. Physical exam shows patient to be minimally somnolent but conversant on exam slow speaking and soft speaking. No focal neurologic deficits, overall slowed condition. Bradycardic on exam.      Social factors affecting care: Nursing home patient  Chronic conditions affecting care: Multiple medical conditions, dialysis and cardiac disease  Chart reviewed: Previous labs reviewed    Differential includes but not limited to: CHF, cardiac arrhythmia, hyperkalemia, CHF, renal failure, MI, sepsis, pneumonia, hypothyroid    Work up includes with interpretations:CMP with CO2 31 and creatinine 2.9 with normal potassium of 4.0.  proBNP over 70,000 reflective of renal failure and mild or possible CHF. Troponin elevated at 169 although baseline is elevated this is more elevated. LFTs unremarkable. Beta hydroxybutyrate 0.12 not reflective of acute DKA. T4 low at 1.2 with TSH significantly elevated at 115 reflective of myxedema coma with relation to patient's condition, CBC unremarkable white count 8.7, hemoglobin 8.9 and hematocrit 35.5. Urinalysis shows UTI, COVID test negative. Venous pH 7.18.   With no DKA and no large lactic acidosis likely reflective of respiratory issues. Lipase also 17 not reflective of acute pancreatitis. Chest x-ray read by radiologist shows CHF. Advanced directive discussion: None    Treatment in ER: IV calcium chloride for potential initial hyperkalemia in the setting of renal failure with bradycardia and hypotension. Also given atropine for her bradycardia and hypotension. Subsequently given Solu-Cortef for persistent hypotension as well as started on levothyroxine IV after obtaining laboratory results. Ultimately started on dopamine drip with titrating dosing with significantly positive response. Consultations in ER: Internal medicine for admission, critical care for ICU consultation and admission to ICU. Diagnosis and disposition: Myxedema coma, CHF, hypotension, bradycardia, chronic renal failure, elevated troponin      ED Course as of 03/10/23 0241   Fri Mar 10, 2023   0023 Patient a little more somnolent, heart rate in the mid 41J, systolic blood pressure around 71. EKG noted. Meds ordered. [NC]   9598 Patient still somnolent, blood pressure improving with the dopamine drip. She is still breathing on her own, she does respond and will wake up slowly, somewhat slow and confused when she talks but is responsive. We will try BiPAP on her. [NC]   0218 Case discussed with Dr. Jorge Hewitt, detailed overview given, he will admit the patient.  [NC]   1406 Case discussed with Rigoberto Becerril for critical care, he sees the patient and they will consult on her. [NC]      ED Course User Index  [NC] Guillaume Morillo DO        EKG Interpretation    Interpreted by emergency department physician    Rhythm:  Undetermined rhythm  Rate: 41  Axis: right  Ectopy: none  Conduction: normal  ST Segments: no acute change  T Waves: no acute change  Q Waves: none    Clinical Impression: Bradycardic    Guillaume Morillo DO      EKG Interpretation #2    Interpreted by emergency department physician    Rhythm: normal sinus   Rate: 71  Axis: right  Ectopy: none  Conduction: normal and very low voltage  ST Segments: no acute change  T Waves: no acute change  Q Waves: none    Clinical Impression: no acute changes    Guillaume Morillo DO    ED Course as of 03/10/23 0241   Fri Mar 10, 2023   0023 Patient a little more somnolent, heart rate in the mid 19F, systolic blood pressure around 71. EKG noted. Meds ordered. [NC]   1832 Patient still somnolent, blood pressure improving with the dopamine drip. She is still breathing on her own, she does respond and will wake up slowly, somewhat slow and confused when she talks but is responsive. We will try BiPAP on her. [NC]   0218 Case discussed with Dr. Jorge Hewitt, detailed overview given, he will admit the patient. [NC]   1810 Case discussed with Rigoberto Becerril for critical care, he sees the patient and they will consult on her. [NC]      ED Course User Index  [NC] Guillaume Morillo DO       --------------------------------------------- PAST HISTORY ---------------------------------------------  Past Medical History:  has a past medical history of A-fib ([facility] Acute on chronic congestive heart failure ([facility] Anxiety, Asthma, CAD (coronary artery disease), Cancer ([address] Chronic kidney disease, COPD exacerbation ([facility] Depression, Diabetes mellitus ([address] H/O mammogram, Hx MRSA infection, Hyperlipidemia, Hypertension, Lateral epicondylitis, Morbid obesity ([address] SERENA on CPAP, Oxygen dependent, Parkinson's disease ([address] and Tubal ligation status. Past Surgical History:  has a past surgical history that includes Gallbladder surgery; Toe amputation; Cholecystectomy; Colonoscopy (7/29/15); Endoscopy, colon, diagnostic (7/19/15); Upper gastrointestinal endoscopy; lumbar laminectomy; Tonsillectomy; Breast lumpectomy; Breast reduction surgery; Cardiac catheterization (4/28/2014); Cardiac catheterization (01/11/2022); CT GUIDED CHEST TUBE (7/8/2022); Upper gastrointestinal endoscopy (N/A, 7/19/2022);  IR GUIDED PERC PLEURAL DRAIN W CATH INSERT (2/14/2023); and IR REMOVAL OF TUNNELED PLEURAL CATH W CUFF (2/23/2023). Social History:  reports that she has never smoked. She has never used smokeless tobacco. She reports that she does not drink alcohol and does not use drugs. Family History: family history includes Cancer in her father and mother; Diabetes in her sister; Heart Disease in her sister; Other in her brother; Seizures in her son. The patients home medications have been reviewed.     Allergies: Ciprofloxacin, Codeine, and Digoxin and related    -------------------------------------------------- RESULTS -------------------------------------------------    Lab  Results for orders placed or performed during the hospital encounter of 03/09/23   COVID-19, Rapid    Specimen: Nasopharyngeal Swab   Result Value Ref Range    SARS-CoV-2, NAAT Not Detected Not Detected   Lactate, Sepsis   Result Value Ref Range    Lactic Acid, Sepsis 1.4 0.5 - 1.9 mmol/L   CBC with Auto Differential   Result Value Ref Range    WBC 8.7 4.5 - 11.5 E9/L    RBC 3.61 3.50 - 5.50 E12/L    Hemoglobin 8.9 (L) 11.5 - 15.5 g/dL    Hematocrit 35.5 34.0 - 48.0 %    MCV 98.3 80.0 - 99.9 fL    MCH 24.7 (L) 26.0 - 35.0 pg    MCHC 25.1 (L) 32.0 - 34.5 %    RDW 20.8 (H) 11.5 - 15.0 fL    Platelets 914 886 - 425 E9/L    MPV 11.0 7.0 - 12.0 fL    Neutrophils % 78.1 43.0 - 80.0 %    Lymphocytes % 14.0 (L) 20.0 - 42.0 %    Monocytes % 3.5 2.0 - 12.0 %    Eosinophils % 0.9 0.0 - 6.0 %    Basophils % 1.8 0.0 - 2.0 %    Neutrophils Absolute 6.96 1.80 - 7.30 E9/L    Lymphocytes Absolute 1.22 (L) 1.50 - 4.00 E9/L    Monocytes Absolute 0.35 0.10 - 0.95 E9/L    Eosinophils Absolute 0.08 0.05 - 0.50 E9/L    Basophils Absolute 0.16 0.00 - 0.20 E9/L    Myelocyte Percent 1.8 0 - 0 %    nRBC 0.9 /100 WBC    Vacuolated Neutrophils 1+     Anisocytosis 2+     Polychromasia 1+     Poikilocytes 1+     Stomatocytes 1+     Basophilic Stippling 1+    Comprehensive Metabolic Panel   Result Value Ref Range Sodium 138 132 - 146 mmol/L    Potassium 4.0 3.5 - 5.0 mmol/L    Chloride 99 98 - 107 mmol/L    CO2 31 (H) 22 - 29 mmol/L    Anion Gap 8 7 - 16 mmol/L    Glucose 85 74 - 99 mg/dL    BUN 16 6 - 23 mg/dL    Creatinine 2.9 (H) 0.5 - 1.0 mg/dL    Est, Glom Filt Rate 17 >=60 mL/min/1.73    Calcium 8.7 8.6 - 10.2 mg/dL    Total Protein 6.0 (L) 6.4 - 8.3 g/dL    Albumin 3.8 3.5 - 5.2 g/dL    Total Bilirubin 0.5 0.0 - 1.2 mg/dL    Alkaline Phosphatase 81 35 - 104 U/L    ALT <5 0 - 32 U/L    AST 6 0 - 31 U/L   Protime-INR   Result Value Ref Range    Protime 29.2 (H) 9.3 - 12.4 sec    INR 2.5    APTT   Result Value Ref Range    aPTT 47.0 (H) 24.5 - 35.1 sec   Brain Natriuretic Peptide   Result Value Ref Range    Pro-BNP >70,000 (H) 0 - 125 pg/mL   pH, venous   Result Value Ref Range    pH, Vamsi 7.18 (LL) 7.35 - 7.45   Magnesium   Result Value Ref Range    Magnesium 2.0 1.6 - 2.6 mg/dL   Troponin   Result Value Ref Range    Troponin, High Sensitivity 169 (H) 0 - 9 ng/L   Beta-Hydroxybutyrate   Result Value Ref Range    Beta-Hydroxybutyrate 0.12 0.02 - 0.27 mmol/L   Urinalysis   Result Value Ref Range    Color, UA Yellow Straw/Yellow    Clarity, UA CLOUDY (A) Clear    Glucose, Ur Negative Negative mg/dL    Bilirubin Urine Negative Negative    Ketones, Urine TRACE (A) Negative mg/dL    Specific Gravity, UA 1.020 1.005 - 1.030    Blood, Urine MODERATE (A) Negative    pH, UA 6.5 5.0 - 9.0    Protein, UA >=300 (A) Negative mg/dL    Urobilinogen, Urine 0.2 <2.0 E.U./dL    Nitrite, Urine POSITIVE (A) Negative    Leukocyte Esterase, Urine LARGE (A) Negative   Lipase   Result Value Ref Range    Lipase 17 13 - 60 U/L   T4   Result Value Ref Range    T4, Total 1.2 (L) 4.5 - 11.7 mcg/dL   TSH   Result Value Ref Range    .300 (H) 0.270 - 4.200 uIU/mL   Microscopic Urinalysis   Result Value Ref Range    Hyaline Casts, UA 0-2 0 - 2 /LPF    WBC, UA >20 (A) 0 - 5 /HPF    RBC, UA 10-20 (A) 0 - 2 /HPF    Epithelial Cells, UA MODERATE /HPF Bacteria, UA MANY (A) None Seen /HPF   Arterial Blood Gas, Respiratory Only   Result Value Ref Range    POC Source Arterial     Pt Temp 37.0     PH (TEMPERATURE CORRECTED) 7.139 (LL) 7.350 - 7.450    PCO2 (TEMP CORRECTED) 94.3 (HH) 35.0 - 45.0 mmHg    PO2 (TEMP CORRECTED) 81.9 (H) 60.0 - 80.0 mmHg    HCO3 32.1 (H) 22.0 - 26.0 mmol/L    B.E. 1.1 -3.0 - 3.0 mmol/L    O2 Sat 90.6 (L) 92.0 - 98.5 %          DEVICE [id number]     Critical Notification Yes     Delivery Systems Cannula    POCT Glucose   Result Value Ref Range    Meter Glucose 85 74 - 99 mg/dL   EKG 12 Lead   Result Value Ref Range    Ventricular Rate 41 BPM    Atrial Rate 42 BPM    P-R Interval 218 ms    QRS Duration 88 ms    Q-T Interval 478 ms    QTc Calculation (Bazett) 394 ms    P Axis 19 degrees    R Axis 111 degrees    T Axis -16 degrees   EKG 12 Lead   Result Value Ref Range    Ventricular Rate 71 BPM    Atrial Rate 71 BPM    P-R Interval 184 ms    QRS Duration 90 ms    Q-T Interval 408 ms    QTc Calculation (Bazett) 443 ms    P Axis 63 degrees    R Axis 107 degrees    T Axis -73 degrees       Radiology  XR CHEST PORTABLE   Final Result   Mild pulmonary vascular congestion. Interval worsening of right upper lobe and right middle lobe atelectasis. Persistent right lower lobe atelectasis and/or pneumonitis. Small right pleural effusion.                 ------------------------- NURSING NOTES AND VITALS REVIEWED ---------------------------  Date / Time Roomed:  3/9/2023 11:15 PM  ED Bed Assignment:  08/08    The nursing notes within the ED encounter and vital signs as below have been reviewed.    Patient Vitals for the past 24 hrs:   BP Temp Temp src Pulse Resp SpO2 Weight   03/10/23 0233 -- -- -- -- 16 -- --   03/10/23 0231 (!) 123/51 -- -- 75 15 95 % --   03/10/23 0226 (!) 125/50 -- -- 74 15 95 % --   03/10/23 0222 (!) 124/51 -- -- 71 13 94 % --   03/10/23 0217 (!) 118/47 -- -- 67 12 94 % --   03/10/23 0207 (!) 104/38 -- -- (!) 49 12 91 % --   03/10/23 0202 (!) 103/37 -- -- (!) 47 14 91 % --   03/10/23 0158 (!) 101/40 -- -- (!) 45 17 90 % --   03/10/23 0153 (!) 77/48 -- -- (!) 42 14 93 % --   03/10/23 0147 94/67 -- -- (!) 44 12 95 % --   03/10/23 0129 (!) 81/32 -- -- (!) 43 16 94 % --   03/10/23 0119 (!) 47/23 -- -- (!) 40 16 97 % --   03/10/23 0059 (!) 54/20 -- -- (!) 38 13 99 % --   03/10/23 0048 (!) 55/22 -- -- (!) 40 16 98 % --   03/10/23 0029 (!) 79/26 -- -- (!) 49 12 99 % --   03/10/23 0019 (!) 71/25 -- -- 68 13 98 % --   03/10/23 0009 (!) 69/40 -- -- (!) 47 12 99 % --   03/09/23 2359 (!) 79/39 -- -- (!) 36 14 100 % --   03/09/23 2349 (!) 82/45 -- -- (!) 44 13 98 % --   03/09/23 2329 (!) 112/55 98 °F (36.7 °C) Oral (!) 47 18 96 % 223 lb (101.2 kg)       Oxygen Saturation Interpretation: Normal      ------------------------------------------ PROGRESS NOTES ------------------------------------------  Re-evaluation(s):    I have spoken with the patient and discussed todays results, in addition to providing specific details for the plan of care and counseling regarding the diagnosis and prognosis. Their questions are answered at this time and they are agreeable with the plan.      --------------------------------- ADDITIONAL PROVIDER NOTES ---------------------------------  Consultations: This patient's ED course included: a personal history and physicial examination, re-evaluation prior to disposition, multiple bedside re-evaluations, IV medications, cardiac monitoring, continuous pulse oximetry, and complex medical decision making and emergency management    This patient has remained hemodynamically stable, been closely monitored, initially deteriorated, but then stabilized, and ultimately improved during their ED course. Please note that the withdrawal or failure to initiate urgent interventions for this patient would likely result in a life threatening deterioration or permanent disability.       Accordingly this patient received 45 minutes of critical care time, excluding separately billable procedures. Systems at risk for deterioration include: Cardiopulmonary. 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 Acute on chronic congestive heart failure, unspecified heart failure type ([facility]    3. Chronic renal failure, stage 4 (severe) (HCC)    4. Elevated troponin    5. Hypotension, unspecified hypotension type    6. Acute respiratory failure with hypercapnia (HCC)          Disposition  Patient's disposition: Admit to CCU/ICU  Patient's condition is fair.        Brian Chester,   03/10/23 4223

## 2023-03-10 NOTE — FLOWSHEET NOTE
Inpatient Wound Care    Admit Date: 3/9/2023 11:15 PM    Reason for consult:  skin care    Significant history:    Past Medical History:   Diagnosis Date    A-fib [facility])     Acute on chronic congestive heart failure ([facility]     Anxiety     Asthma     CAD (coronary artery disease) 01/21/2016    Cancer ([facility]  breast ca 2006    right lumpectomy    Chronic kidney disease     nephrolithiasis    COPD exacerbation ([facility] 10/12/2022    Depression     Diabetes mellitus ([facility]     H/O mammogram     Hx MRSA infection     toe infection january 2012    Hyperlipidemia     Hypertension     Lateral epicondylitis     Morbid obesity (HCC)     SERENA on CPAP     Oxygen dependent     Parkinson's disease ([facility]     Tubal ligation status        Wound history:      Findings:     03/10/23 0939   Wound 03/10/23 Groin Right; Inner; Upper   Date First Assessed/Time First Assessed: 03/10/23 0938   Present on Hospital Admission: Yes  Primary Wound Type: Pressure Injury  Location: Groin  Wound Location Orientation: Right; Inner; Upper   Wound Image    Wound Etiology Deep tissue/Injury   Wound Cleansed Cleansed with saline   Wound Length (cm) 0.8 cm   Wound Width (cm) 15 cm   Wound Surface Area (cm^2) 12 cm^2   Wound Assessment   (purplish red)   Drainage Amount None   Odor None   Nicole-wound Assessment   (redness)       Impression:  deep tissue injury to right groin area  Redness to right elbow    Interventions in place:  low air loss bed    Plan:  Mepilex to elbow      Julia Gonzales RN 3/10/2023 9:46 AM

## 2023-03-10 NOTE — H&P
[address] [place], [place] [address]ist Group   HISTORY AND PHYSICAL EXAM      AUTHOR: Sylvia Nageotte, MD PATIENT NAME: Irina Sunshine   PCP: Freida Barfield MD  MRN: [id number], : 1950       CHIEF COMPLAINT / REASON FOR ADMISSION: Bradycardia, hypotension, general fatigue and weakness  HPI:   This is a 67 y.o. female  has a past medical history of A-fib ([address] Acute on chronic congestive heart failure ([address] Anxiety, Asthma, CAD (coronary artery disease), Cancer ([address] Chronic kidney disease, COPD exacerbation ([address] Depression, Diabetes mellitus ([address] H/O mammogram, Hx MRSA infection, Hyperlipidemia, Hypertension, Lateral epicondylitis, Morbid obesity ([address] SERENA on CPAP, Oxygen dependent, Parkinson's disease ([address] and Tubal ligation status. presented with Bradycardia, hypotension, general fatigue and weakness  for last few days prior to arrival to the hospital.  In ED patient was found to be hypotensive, lethargic and was diagnosed as myxedema coma with hypotension.  No additional history could be obtained because of AMS  ROS:  Unable to obtain because of AMS    PMH:  Past Medical History:   Diagnosis Date    A-fib ([address]     Acute on chronic congestive heart failure (HCC)     Anxiety     Asthma     CAD (coronary artery disease) 2016    Cancer ([address]  breast ca 2006    right lumpectomy    Chronic kidney disease     nephrolithiasis    COPD exacerbation ([address] 10/12/2022    Depression     Diabetes mellitus ([address]     H/O mammogram     Hx MRSA infection     toe infection 2012    Hyperlipidemia     Hypertension     Lateral epicondylitis     Morbid obesity (HCC)     SERENA on CPAP     Oxygen dependent     Parkinson's disease ([address]     Tubal ligation status        Surgical History:  Past Surgical History:   Procedure Laterality Date    BREAST LUMPECTOMY      BREAST REDUCTION SURGERY      CARDIAC CATHETERIZATION  2014    Dr. Rodney Hawkins  2022    Dr. Aston Ross COLONOSCOPY  7/29/15    CT GUIDED CHEST TUBE  7/8/2022    CT GUIDED CHEST TUBE 7/8/2022 Julio Ford MD [facility]    ENDOSCOPY, COLON, DIAGNOSTIC  7/19/15    GALLBLADDER SURGERY      IR PERC CATH PLEURAL DRAIN W/IMAG  2/14/2023    IR PERC CATH PLEURAL DRAIN W/IMAG 2/14/2023 [facility]    IR REMOVAL OF TUNNELED PLEURAL CATH W CUFF  2/23/2023    IR REMOVAL OF TUNNELED PLEURAL CATH W CUFF 2/23/2023 [facility]    LUMBAR LAMINECTOMY      TOE AMPUTATION      TONSILLECTOMY      UPPER GASTROINTESTINAL ENDOSCOPY      UPPER GASTROINTESTINAL ENDOSCOPY N/A 7/19/2022    EGD ESOPHAGOGASTRODUODENOSCOPY performed by Humaira Garibay MD at [facility]       Medications Prior to Admission:    Prior to Admission medications    Medication Sig Start Date End Date Taking? Authorizing Provider   metoprolol succinate (TOPROL XL) 25 MG extended release tablet Take 1 tablet by mouth daily Hold if SBP is less than 100 or HR less than 55 2/24/23   Bel Luong MD   BiPAP Machine MISC by Does not apply route Nightly.  12/6 30% PER NURSING HOME PAPERWORK    Historical Provider, MD   glucagon, rDNA, 1 MG injection Inject 1 mg into the muscle as needed for Low blood sugar    Historical Provider, MD   Glucose (TRUEPLUS) 15 GM/32ML GEL Take 15 g by mouth as needed    Historical Provider, MD   hydrOXYzine pamoate (VISTARIL) 25 MG capsule Take 25 mg by mouth 3 times daily as needed for Itching    Historical Provider, MD   acetaminophen (TYLENOL) 325 MG tablet Take 650 mg by mouth every 4 hours as needed for Pain    Historical Provider, MD   apixaban (ELIQUIS) 5 MG TABS tablet Take 1 tablet by mouth 2 times daily 12/24/22   Alton Vaughan DO   midodrine (PROAMATINE) 5 MG tablet Take 1 tablet by mouth 3 times daily (with meals) 12/25/22   Alton Vaughan DO   sertraline (ZOLOFT) 50 MG tablet Take 50 mg by mouth daily    Historical Provider, MD   loperamide (IMODIUM) 2 MG capsule Take 2 mg by mouth 4 times daily as needed for Diarrhea    Historical Provider, MD   OXYGEN Inhale 3 L into the lungs continuous    Historical Provider, MD   docusate sodium (COLACE, DULCOLAX) 100 MG CAPS Take 100 mg by mouth 2 times daily as needed for Constipation 9/8/22   Alfonso Bhatti MD   atorvastatin (LIPITOR) 20 MG tablet Take 20 mg by mouth nightly    Historical Provider, MD   ipratropium-albuterol (DUONEB) 0.5-2.5 (3) MG/3ML SOLN nebulizer solution Inhale 1 vial into the lungs 4 times daily    Historical Provider, MD   miconazole (MICOTIN) 2 % powder Apply 1 each topically 2 times daily Apply topically under breasts twice daily    Historical Provider, MD   pantoprazole (PROTONIX) 40 MG tablet Take 40 mg by mouth every morning    Historical Provider, MD   mirtazapine (REMERON) 15 MG tablet Take 7.5 mg by mouth nightly    Historical Provider, MD   budesonide-formoterol (SYMBICORT) 160-4.5 MCG/ACT AERO Inhale 2 puffs into the lungs 2 times daily    Historical Provider, MD   metoprolol tartrate (LOPRESSOR) 25 MG tablet Take 0.5 tablets by mouth in the morning and 0.5 tablets before bedtime. 7/27/22 9/8/22  Milton Liu MD   carbidopa-levodopa (SINEMET CR)  MG per extended release tablet Take 2 tablets by mouth in the morning and 2 tablets at noon and 2 tablets in the evening. 7/23/22   Milton Liu MD   rOPINIRole (REQUIP) 1 MG tablet Take 1 tablet by mouth in the morning and 1 tablet at noon and 1 tablet before bedtime.  7/23/22   Milton Liu MD   amiodarone (CORDARONE) 200 MG tablet Take 1 tablet by mouth in the morning. 7/24/22 9/8/22  Milton Liu MD   budesonide (PULMICORT) 0.5 MG/2ML nebulizer suspension Take 2 mLs by nebulization in the morning and 2 mLs in the evening. 7/23/22 9/8/22  Milton Liu MD   insulin glargine-[facility]) 100 UNIT/ML injection vial Inject 5 Units into the skin nightly 7/23/22 9/8/22  Milton Liu MD   QUEtiapine (SEROQUEL) 25 MG tablet Take 1 tablet by mouth in the morning and 1 tablet before bedtime. 7/23/22 9/8/22  Chaparrita Hairston MD   sucralfate (CARAFATE) 1 GM tablet Take 1 tablet by mouth in the morning and 1 tablet at noon and 1 tablet in the evening and 1 tablet before bedtime. 7/20/22   Leyla Gaines DO   divalproex (DEPAKOTE) 250 MG DR tablet Take 250 mg by mouth 2 times daily  7/23/22  Historical Provider, MD   ferrous sulfate (IRON 325) 325 (65 Fe) MG tablet Take 325 mg by mouth daily (with breakfast)  Patient not taking: Reported on 7/7/2022 7/23/22  Historical Provider, MD   montelukast (SINGULAIR) 10 MG tablet Take 10 mg by mouth nightly    Historical Provider, MD       Allergies:    Ciprofloxacin, Codeine, and Digoxin and related    Social History:    reports that she has never smoked. She has never used smokeless tobacco. She reports that she does not drink alcohol and does not use drugs. Family History:   family history includes Cancer in her father and mother; Diabetes in her sister; Heart Disease in her sister; Other in her brother; Seizures in her son. PHYSICAL EXAM:  Vitals:  /76   Pulse 75   Temp (!) 95.9 °F (35.5 °C) (Bladder)   Resp 16   Wt 215 lb 6.4 oz (97.7 kg)   SpO2 100%   BMI 42.07 kg/m²   GENERAL: Bradycardic, hypotensive, lethargic  HEENT: PERRLA, no icterus. OP clear and no exudates. NECK: Supple  no carotid/ophthalmic bruits, JVD None. RESPIRATORY:  Bilateral equal vesicular breath sound with no wheezing. Lung bases are clear. HEART: Bradycardia and regular rhythm. Normal S1 and S2, No S3 or S4 is audible. ABDOMEN: Soft, nondistended, nontender. No hepatomegaly or splenomegaly. No CVA tenderness on the both sides. Bowel sound is present. EXTREMITIES: All peripheral pulses are present. No calf tenderness or swelling. No pedal edema is present. Nell Sheppard NEUROLOGY: Lethargic. No new focal neuro deficit.   LABS:  Recent Labs     03/10/23  0001 03/10/23  0440   WBC 8.7 11.4   RBC 3.61 4.12 HGB 8.9* 10.1*   HCT 35.5 40.1   MCV 98.3 97.3   MCH 24.7* 24.5*   MCHC 25.1* 25.2*   RDW 20.8* 20.8*    247   MPV 11.0 10.5     Recent Labs     03/10/23  0001 03/10/23  0440    138   K 4.0 4.2   CL 99 98   CO2 31* 32*   BUN 16 17   CREATININE 2.9* 2.8*   GLUCOSE 85 141*   CALCIUM 8.7 9.3     No results for input(s): POCGLU in the last 72 hours.   Results for orders placed or performed during the hospital encounter of 03/09/23   COVID-19, Rapid    Specimen: Nasopharyngeal Swab   Result Value Ref Range    SARS-CoV-2, NAAT Not Detected Not Detected   Lactate, Sepsis   Result Value Ref Range    Lactic Acid, Sepsis 1.4 0.5 - 1.9 mmol/L   Lactate, Sepsis   Result Value Ref Range    Lactic Acid, Sepsis 1.0 0.5 - 1.9 mmol/L   CBC with Auto Differential   Result Value Ref Range    WBC 8.7 4.5 - 11.5 E9/L    RBC 3.61 3.50 - 5.50 E12/L    Hemoglobin 8.9 (L) 11.5 - 15.5 g/dL    Hematocrit 35.5 34.0 - 48.0 %    MCV 98.3 80.0 - 99.9 fL    MCH 24.7 (L) 26.0 - 35.0 pg    MCHC 25.1 (L) 32.0 - 34.5 %    RDW 20.8 (H) 11.5 - 15.0 fL    Platelets 472 558 - 110 E9/L    MPV 11.0 7.0 - 12.0 fL    Neutrophils % 78.1 43.0 - 80.0 %    Lymphocytes % 14.0 (L) 20.0 - 42.0 %    Monocytes % 3.5 2.0 - 12.0 %    Eosinophils % 0.9 0.0 - 6.0 %    Basophils % 1.8 0.0 - 2.0 %    Neutrophils Absolute 6.96 1.80 - 7.30 E9/L    Lymphocytes Absolute 1.22 (L) 1.50 - 4.00 E9/L    Monocytes Absolute 0.35 0.10 - 0.95 E9/L    Eosinophils Absolute 0.08 0.05 - 0.50 E9/L    Basophils Absolute 0.16 0.00 - 0.20 E9/L    Myelocyte Percent 1.8 0 - 0 %    nRBC 0.9 /100 WBC    Vacuolated Neutrophils 1+     Anisocytosis 2+     Polychromasia 1+     Poikilocytes 1+     Stomatocytes 1+     Basophilic Stippling 1+    Comprehensive Metabolic Panel   Result Value Ref Range    Sodium 138 132 - 146 mmol/L    Potassium 4.0 3.5 - 5.0 mmol/L    Chloride 99 98 - 107 mmol/L    CO2 31 (H) 22 - 29 mmol/L    Anion Gap 8 7 - 16 mmol/L    Glucose 85 74 - 99 mg/dL    BUN 16 6 - 23 mg/dL    Creatinine 2.9 (H) 0.5 - 1.0 mg/dL    Est, Glom Filt Rate 17 >=60 mL/min/1.73    Calcium 8.7 8.6 - 10.2 mg/dL    Total Protein 6.0 (L) 6.4 - 8.3 g/dL    Albumin 3.8 3.5 - 5.2 g/dL    Total Bilirubin 0.5 0.0 - 1.2 mg/dL    Alkaline Phosphatase 81 35 - 104 U/L    ALT <5 0 - 32 U/L    AST 6 0 - 31 U/L   Protime-INR   Result Value Ref Range    Protime 29.2 (H) 9.3 - 12.4 sec    INR 2.5    APTT   Result Value Ref Range    aPTT 47.0 (H) 24.5 - 35.1 sec   Brain Natriuretic Peptide   Result Value Ref Range    Pro-BNP >70,000 (H) 0 - 125 pg/mL   pH, venous   Result Value Ref Range    pH, Vamsi 7.18 (LL) 7.35 - 7.45   Magnesium   Result Value Ref Range    Magnesium 2.0 1.6 - 2.6 mg/dL   Troponin   Result Value Ref Range    Troponin, High Sensitivity 169 (H) 0 - 9 ng/L   Beta-Hydroxybutyrate   Result Value Ref Range    Beta-Hydroxybutyrate 0.12 0.02 - 0.27 mmol/L   Urinalysis   Result Value Ref Range    Color, UA Yellow Straw/Yellow    Clarity, UA CLOUDY (A) Clear    Glucose, Ur Negative Negative mg/dL    Bilirubin Urine Negative Negative    Ketones, Urine TRACE (A) Negative mg/dL    Specific Gravity, UA 1.020 1.005 - 1.030    Blood, Urine MODERATE (A) Negative    pH, UA 6.5 5.0 - 9.0    Protein, UA >=300 (A) Negative mg/dL    Urobilinogen, Urine 0.2 <2.0 E.U./dL    Nitrite, Urine POSITIVE (A) Negative    Leukocyte Esterase, Urine LARGE (A) Negative   Lipase   Result Value Ref Range    Lipase 17 13 - 60 U/L   T4   Result Value Ref Range    T4, Total 1.2 (L) 4.5 - 11.7 mcg/dL   TSH   Result Value Ref Range    .300 (H) 0.270 - 4.200 uIU/mL   Microscopic Urinalysis   Result Value Ref Range    Hyaline Casts, UA 0-2 0 - 2 /LPF    WBC, UA >20 (A) 0 - 5 /HPF    RBC, UA 10-20 (A) 0 - 2 /HPF    Epithelial Cells, UA MODERATE /HPF    Bacteria, UA MANY (A) None Seen /HPF   Arterial Blood Gas, Respiratory Only   Result Value Ref Range    POC Source Arterial     Pt Temp 37.0     PH (TEMPERATURE CORRECTED) 7.139 (LL) 7.350 - 7.450    PCO2 (TEMP CORRECTED) 94.3 (HH) 35.0 - 45.0 mmHg    PO2 (TEMP CORRECTED) 81.9 (H) 60.0 - 80.0 mmHg    HCO3 32.1 (H) 22.0 - 26.0 mmol/L    B.E. 1.1 -3.0 - 3.0 mmol/L    O2 Sat 90.6 (L) 92.0 - 98.5 %          DEVICE 15,065,521,400,688     Critical Notification Yes     Delivery Systems Cannula    CBC with Auto Differential   Result Value Ref Range    WBC 11.4 4.5 - 11.5 E9/L    RBC 4.12 3.50 - 5.50 E12/L    Hemoglobin 10.1 (L) 11.5 - 15.5 g/dL    Hematocrit 40.1 34.0 - 48.0 %    MCV 97.3 80.0 - 99.9 fL    MCH 24.5 (L) 26.0 - 35.0 pg    MCHC 25.2 (L) 32.0 - 34.5 %    RDW 20.8 (H) 11.5 - 15.0 fL    Platelets 243 337 - 771 E9/L    MPV 10.5 7.0 - 12.0 fL    Neutrophils % 89.6 (H) 43.0 - 80.0 %    Lymphocytes % 6.1 (L) 20.0 - 42.0 %    Monocytes % 2.6 2.0 - 12.0 %    Eosinophils % 0.9 0.0 - 6.0 %    Basophils % 0.7 0.0 - 2.0 %    Neutrophils Absolute 10.37 (H) 1.80 - 7.30 E9/L    Lymphocytes Absolute 0.68 (L) 1.50 - 4.00 E9/L    Monocytes Absolute 0.34 0.10 - 0.95 E9/L    Eosinophils Absolute 0.10 0.05 - 0.50 E9/L    Basophils Absolute 0.00 0.00 - 0.20 E9/L    Metamyelocytes Relative 0.9 0.0 - 1.0 %    Anisocytosis 2+     Polychromasia 1+     Hypochromia 2+     Poikilocytes 1+     Ovalocytes 1+     Basophilic Stippling 1+    Basic Metabolic Panel   Result Value Ref Range    Sodium 138 132 - 146 mmol/L    Potassium 4.2 3.5 - 5.0 mmol/L    Chloride 98 98 - 107 mmol/L    CO2 32 (H) 22 - 29 mmol/L    Anion Gap 8 7 - 16 mmol/L    Glucose 141 (H) 74 - 99 mg/dL    BUN 17 6 - 23 mg/dL    Creatinine 2.8 (H) 0.5 - 1.0 mg/dL    Est, Glom Filt Rate 17 >=60 mL/min/1.73    Calcium 9.3 8.6 - 10.2 mg/dL   Magnesium   Result Value Ref Range    Magnesium 2.0 1.6 - 2.6 mg/dL   Phosphorus   Result Value Ref Range    Phosphorus 2.8 2.5 - 4.5 mg/dL   TSH   Result Value Ref Range    TSH 95.930 (H) 0.270 - 4.200 uIU/mL   Arterial Blood Gas, Respiratory Only   Result Value Ref Range    POC Source Arterial     FIO2 60.0     Pt Temp 37.0     PH (TEMPERATURE CORRECTED) 7.150 (LL) 7.350 - 7.450    PCO2 (TEMP CORRECTED) 94.6 (HH) 35.0 - 45.0 mmHg    PO2 (TEMP CORRECTED) 75.6 60.0 - 80.0 mmHg    HCO3 33.0 (H) 22.0 - 26.0 mmol/L    B.E. 1.3 -3.0 - 3.0 mmol/L    O2 Sat 88.7 (L) 92.0 - 98.5 %     ID 2,323     DEVICE 15,065,521,400,688     Critical Notification Yes     Pressure Support 18 cmH2O    Positive End EXP Press 8 cmH2O    Delivery Systems BiPAP    POCT Glucose   Result Value Ref Range    Meter Glucose 85 74 - 99 mg/dL   EKG 12 Lead   Result Value Ref Range    Ventricular Rate 41 BPM    Atrial Rate 42 BPM    P-R Interval 218 ms    QRS Duration 88 ms    Q-T Interval 478 ms    QTc Calculation (Bazett) 394 ms    P Axis 19 degrees    R Axis 111 degrees    T Axis -16 degrees   EKG 12 Lead   Result Value Ref Range    Ventricular Rate 71 BPM    Atrial Rate 71 BPM    P-R Interval 184 ms    QRS Duration 90 ms    Q-T Interval 408 ms    QTc Calculation (Bazett) 443 ms    P Axis 63 degrees    R Axis 107 degrees    T Axis -73 degrees     ED Course as of 03/10/23 0617   Fri Mar 10, 2023   0023 Patient a little more somnolent, heart rate in the mid 04G, systolic blood pressure around 71. EKG noted. Meds ordered. [NC]   8780 Patient still somnolent, blood pressure improving with the dopamine drip. She is still breathing on her own, she does respond and will wake up slowly, somewhat slow and confused when she talks but is responsive. We will try BiPAP on her. [NC]   0218 Case discussed with Dr. Jael Hyde, detailed overview given, he will admit the patient. [NC]   6660 Case discussed with Jing Espana for critical care, he sees the patient and they will consult on her. [NC]      ED Course User Index  [NC] Mak Montalvo DO     Radiology: XR CHEST PORTABLE    Result Date: 3/10/2023  EXAMINATION: ONE XRAY VIEW OF THE CHEST; ONE SUPINE XRAY VIEW(S) OF THE ABDOMEN 3/10/2023 5:49 am COMPARISON: 4 hours prior.  HISTORY: ORDERING SYSTEM PROVIDED HISTORY: ett placement TECHNOLOGIST PROVIDED HISTORY: Reason for exam:->ett placement; ORDERING SYSTEM PROVIDED HISTORY: Confirmation of course of NG/OG/NE tube and location of tip of tube TECHNOLOGIST PROVIDED HISTORY: Reason for exam:->Confirmation of course of NG/OG/NE tube and location of tip of tube Portable? ->Yes FINDINGS: Endotracheal tube tip with good positioning 3.7 cm above the juan francisco. Gastric tube with good position, proximal side hole beneath the hemidiaphragms. Right jugular hemodialysis catheter remains with good positioning. Obscuration of the right cardiac base and right hemidiaphragm. Improving aeration right base. Small right pleural effusion noted. No pneumothorax. Body wall soft tissues unremarkable. Osseous thorax intact. 1. Endotracheal tube and gastric tube with good positioning. 2. Right jugular hemodialysis catheter remains with good positioning. 3. Improved aeration of the right base with small right pleural effusion now evident. RECOMMENDATION: Careful clinical correlation and follow up recommended. XR CHEST PORTABLE    Result Date: 3/10/2023  EXAMINATION: ONE XRAY VIEW OF THE CHEST 3/9/2023 11:39 pm COMPARISON: Chest one view, 02/23/2023 HISTORY: ORDERING SYSTEM PROVIDED HISTORY: hypotension TECHNOLOGIST PROVIDED HISTORY: Reason for exam:->hypotension FINDINGS: Lines, tubes, and devices: There is stable position of right internal jugular tip localized at the atriocaval junction. Lungs and pleura: There is mild pulmonary vascular congestion and persistent small right pleural effusion. There is interval worsening atelectasis in the right middle lobe and right upper lobe. Right lower lobe atelectasis and/or pneumonitis persists. No pneumothorax. Cardiomediastinal silhouette: The mediastinum is stable. The heart size is normal. Bones and soft tissues: There are surgical clips in right axilla. Surgical clips are superimposed over right upper quadrant.      Mild pulmonary vascular congestion. Interval worsening of right upper lobe and right middle lobe atelectasis. Persistent right lower lobe atelectasis and/or pneumonitis. Small right pleural effusion. XR ABDOMEN FOR NG/OG/NE TUBE PLACEMENT    Result Date: 3/10/2023  EXAMINATION: ONE XRAY VIEW OF THE CHEST; ONE SUPINE XRAY VIEW(S) OF THE ABDOMEN 3/10/2023 5:49 am COMPARISON: 4 hours prior. HISTORY: ORDERING SYSTEM PROVIDED HISTORY: ett placement TECHNOLOGIST PROVIDED HISTORY: Reason for exam:->ett placement; ORDERING SYSTEM PROVIDED HISTORY: Confirmation of course of NG/OG/NE tube and location of tip of tube TECHNOLOGIST PROVIDED HISTORY: Reason for exam:->Confirmation of course of NG/OG/NE tube and location of tip of tube Portable? ->Yes FINDINGS: Endotracheal tube tip with good positioning 3.7 cm above the juan francisco. Gastric tube with good position, proximal side hole beneath the hemidiaphragms. Right jugular hemodialysis catheter remains with good positioning. Obscuration of the right cardiac base and right hemidiaphragm. Improving aeration right base. Small right pleural effusion noted. No pneumothorax. Body wall soft tissues unremarkable. Osseous thorax intact. 1. Endotracheal tube and gastric tube with good positioning. 2. Right jugular hemodialysis catheter remains with good positioning. 3. Improved aeration of the right base with small right pleural effusion now evident. RECOMMENDATION: Careful clinical correlation and follow up recommended. ASSESSMENT:    Present on Admission:   Myxedema coma (HCC)    PLAN:  # Hypotensive, Bradycardia, hypoventilation, Hypercapnia - TSH elevated - Myxedema Comma   Patient was intubated for respiratory acidosis with CO2 retention   On Levophed for hypotension    Started with IV Vanc + Ceftriaxone for possible pneumonia   IF Hydrocortisone + Levothyroxine - started   ICU monitoring  # ESRD on Dialysis   BUN/Cr, Hb&Hct is stable  Fluid restriction to 1 litre a day.   Dialysis as per renal team/ Renal consult requested  # Rest of the chronic medical problems are stable and will be managed with appropriately with home medications, placed nursing communication order to verify home medications before giving them to the patient. # Diet: On PO Diet  # IVF's: No  # Fall Precaution: Yes  # Disposition: Home/primary residence   # Code Status: Full code  # DVT Prophylaxis : Lovenox SC  SIGNATURE: Shaggy Yates MD PATIENT NAME: Noé Lawson   CONTACT #: Hospitalist on call MRN: [id number]     Disclaimer: Portions of this note may have been generated using Dragon voice recognition software. Reasonable efforts were made to correct any dictation errors that resulted due to the programming of this software but some may still be present.

## 2023-03-10 NOTE — CARE COORDINATION
Case Management Assessment  Initial Evaluation    Date/Time of Evaluation: 3/10/2023 3:27 PM  Assessment Completed by: Hamlet Fitch RN    If patient is discharged prior to next notation, then this note serves as note for discharge by case management. Patient Name: Ap Lutz                   YOB: 1950  Diagnosis: Myxedema coma ([facility] [address] [E03.5]  Elevated troponin [R77.8]  Acute respiratory failure with hypercapnia (HCC) [J96.02]  Chronic renal failure, stage 4 (severe) (HCC) [N18.4]  Hypotension, unspecified hypotension type [I95.9]  Acute on chronic congestive heart failure, unspecified heart failure type ([facility] [address] [I50.9]                   Date / Time: 3/9/2023 11:15 PM    Patient Admission Status: Inpatient   Readmission Risk (Low < 19, Mod (19-27), High > 27): Readmission Risk Score: 36.7    Current PCP: Moi Choi MD  PCP verified by CM?  Yes    Chart Reviewed: Yes      History Provided by: Child/Family  Patient Orientation: Sedated    Patient Cognition: Other (see comment) (pt intubated/sedated)    Hospitalization in the last 30 days (Readmission):  Yes    Readmission Assessment  Number of Days since last admission?: 8-30 days  Previous Disposition: SNF  Who is being Interviewed: Caregiver  Did you visit your Primary Care Physician after you left the hospital, before you returned this time?: Yes  Did you see a specialist, such as Cardiac, Pulmonary, Orthopedic Physician, etc. after you left the hospital?: Yes  Who advised the patient to return to the hospital?: Physician  Does the patient report anything that got in the way of taking their medications?: No  In our efforts to provide the best possible care to you and others like you, can you think of anything that we could have done to help you after you left the hospital the first time, so that you might not have needed to return so soon?: Other (Comment) (no)        Advance Directives:      Code Status: Full Code   Patient's Primary Decision Maker is: Legal Next of Kin    Primary Decision Maker: Caitlin Walters Child - [phone]    Primary Decision Maker: Tom Thomas - Child - [phone]    Primary Decision Maker: millerPatricia - Child - [phone]    Discharge Planning:      Primary Care Giver: Other (Comment) (SNF)  Patient Support Systems include: Children, Family Members     ADLS  Prior functional level: Assistance with the following:, Bathing, Dressing, Mobility, Toileting  Current functional level: Assistance with the following:, Bathing, Dressing, Toileting, Mobility      Family can provide assistance at DC: Yes  Would you like Case Management to discuss the discharge plan with any other family members/significant others, and if so, who? Yes (dtr Inga)  Plans to Return to Present Housing: Yes  Other Identified Issues/Barriers to RETURNING to current housing: will need intubated   Patient expects to discharge to:  SNF  Plan for transportation at discharge: ambulance      Financial    Payor: Dana Torres / Plan: Justin Warren / Product Type: *No Product type* /     Does insurance require precert for SNF: No    Potential assistance Purchasing Medications:    Meds-to-Beds request:        [address] Mail Kenroy Phipps [phone] - F [phone]  [address]  Phone: [phone] Fax: [phone]    [address] [place], [place] - [address]  [address]   [place] [address]  Phone: [phone] Fax: [phone]      Notes:    Factors facilitating achievement of predicted outcomes: Family support    Barriers to discharge: need extubated     Additional Case Management Notes: pt here from [address] , she is Intubated/sedated in ICU, called pt's yimi Xiong, she is frustrated with the care of her mom concerning her [place] wants her mom to go somewhere where they have respiratory therapists available to trouble shoot bipap , she asked for [facility] [place], referral made, will await response, pt will need PRECERT, signed josh ,Rapid covid test, HENS can be retreived from [facility]. Cm following .  Electronically signed by Chris Hall RN on 3/10/2023 at 3:47 PM      The Plan for Transition of Care is related to the following treatment goals of Myxedema coma ([facility] [address] [E03.5]  Elevated troponin [R77.8]  Acute respiratory failure with hypercapnia (HCC) [J96.02]  Chronic renal failure, stage 4 (severe) (HCC) [N18.4]  Hypotension, unspecified hypotension type [I95.9]  Acute on chronic congestive heart failure, unspecified heart failure type ([facility] [address] [I50.9]      Chris Hall RN  Case Management Department  Ph: [phone] Fax: [phone]

## 2023-03-11 LAB
AADO2: 150.3 MMHG
ALBUMIN SERPL-MCNC: 4.1 G/DL (ref 3.5–5.2)
ALP BLD-CCNC: 94 U/L (ref 35–104)
ALT SERPL-CCNC: <5 U/L (ref 0–32)
ANION GAP SERPL CALCULATED.3IONS-SCNC: 15 MMOL/L (ref 7–16)
ANISOCYTOSIS: ABNORMAL
AST SERPL-CCNC: 7 U/L (ref 0–31)
B.E.: 4.3 MMOL/L (ref -3–3)
BASOPHILIC STIPPLING: ABNORMAL
BASOPHILS ABSOLUTE: 0 E9/L (ref 0–0.2)
BASOPHILS RELATIVE PERCENT: 0.2 % (ref 0–2)
BILIRUB SERPL-MCNC: 1 MG/DL (ref 0–1.2)
BUN BLDV-MCNC: 18 MG/DL (ref 6–23)
CALCIUM SERPL-MCNC: 9 MG/DL (ref 8.6–10.2)
CHLORIDE BLD-SCNC: 93 MMOL/L (ref 98–107)
CO2: 27 MMOL/L (ref 22–29)
COHB: 1 % (ref 0–1.5)
CREAT SERPL-MCNC: 1.9 MG/DL (ref 0.5–1)
CRITICAL: ABNORMAL
DATE ANALYZED: ABNORMAL
DATE OF COLLECTION: ABNORMAL
EOSINOPHILS ABSOLUTE: 0 E9/L (ref 0.05–0.5)
EOSINOPHILS RELATIVE PERCENT: 0 % (ref 0–6)
FIO2: 45 %
GFR SERPL CREATININE-BSD FRML MDRD: 27 ML/MIN/1.73
GLUCOSE BLD-MCNC: 211 MG/DL (ref 74–99)
GRAM STAIN ORDERABLE: NORMAL
HCO3: 27.1 MMOL/L (ref 22–26)
HCT VFR BLD CALC: 35.9 % (ref 34–48)
HEMOGLOBIN: 10.2 G/DL (ref 11.5–15.5)
HHB: 1.3 % (ref 0–5)
HYPOCHROMIA: ABNORMAL
LAB: ABNORMAL
LACTATE DEHYDROGENASE: 239 U/L (ref 135–214)
LACTIC ACID: 1.7 MMOL/L (ref 0.5–2.2)
LYMPHOCYTES ABSOLUTE: 0.79 E9/L (ref 1.5–4)
LYMPHOCYTES RELATIVE PERCENT: 7.9 % (ref 20–42)
Lab: ABNORMAL
MAGNESIUM: 1.9 MG/DL (ref 1.6–2.6)
MCH RBC QN AUTO: 24.9 PG (ref 26–35)
MCHC RBC AUTO-ENTMCNC: 28.4 % (ref 32–34.5)
MCV RBC AUTO: 87.8 FL (ref 80–99.9)
METAMYELOCYTES RELATIVE PERCENT: 1.8 % (ref 0–1)
METER GLUCOSE: 311 MG/DL (ref 74–99)
METER GLUCOSE: 411 MG/DL (ref 74–99)
METHB: 0.5 % (ref 0–1.5)
MODE: AC
MONOCYTES ABSOLUTE: 0.3 E9/L (ref 0.1–0.95)
MONOCYTES RELATIVE PERCENT: 2.6 % (ref 2–12)
NEUTROPHILS ABSOLUTE: 8.91 E9/L (ref 1.8–7.3)
NEUTROPHILS RELATIVE PERCENT: 87.7 % (ref 43–80)
NUCLEATED RED BLOOD CELLS: 0.9 /100 WBC
O2 CONTENT: 15.8 ML/DL
O2 SATURATION: 98.7 % (ref 92–98.5)
O2HB: 97.2 % (ref 94–97)
OPERATOR ID: 2323
PATIENT TEMP: 37 C
PCO2: 34.1 MMHG (ref 35–45)
PDW BLD-RTO: 19.8 FL (ref 11.5–15)
PEEP/CPAP: 5 CMH2O
PFO2: 2.68 MMHG/%
PH BLOOD GAS: 7.52 (ref 7.35–7.45)
PHOSPHORUS: 2 MG/DL (ref 2.5–4.5)
PLATELET # BLD: 189 E9/L (ref 130–450)
PMV BLD AUTO: 10.2 FL (ref 7–12)
PO2: 120.5 MMHG (ref 75–100)
POIKILOCYTES: ABNORMAL
POLYCHROMASIA: ABNORMAL
POTASSIUM SERPL-SCNC: 3.7 MMOL/L (ref 3.5–5)
RBC # BLD: 4.09 E12/L (ref 3.5–5.5)
RI(T): 1.25
RR MECHANICAL: 18 B/MIN
SCHISTOCYTES: ABNORMAL
SODIUM BLD-SCNC: 135 MMOL/L (ref 132–146)
SOURCE, BLOOD GAS: ABNORMAL
SPHEROCYTES: ABNORMAL
TARGET CELLS: ABNORMAL
TEAR DROP CELLS: ABNORMAL
THB: 11.4 G/DL (ref 11.5–16.5)
TIME ANALYZED: 459
TOTAL PROTEIN: 6.4 G/DL (ref 6.4–8.3)
TOTAL PROTEIN: 6.5 G/DL (ref 6.4–8.3)
TSH SERPL DL<=0.05 MIU/L-ACNC: 26.08 UIU/ML (ref 0.27–4.2)
VT MECHANICAL: 350 ML
WBC # BLD: 9.9 E9/L (ref 4.5–11.5)

## 2023-03-11 PROCEDURE — 6370000000 HC RX 637 (ALT 250 FOR IP): Performed by: INTERNAL MEDICINE

## 2023-03-11 PROCEDURE — 6360000002 HC RX W HCPCS: Performed by: SPECIALIST

## 2023-03-11 PROCEDURE — 6360000002 HC RX W HCPCS

## 2023-03-11 PROCEDURE — A4216 STERILE WATER/SALINE, 10 ML: HCPCS | Performed by: INTERNAL MEDICINE

## 2023-03-11 PROCEDURE — 2700000000 HC OXYGEN THERAPY PER DAY

## 2023-03-11 PROCEDURE — 6360000002 HC RX W HCPCS: Performed by: INTERNAL MEDICINE

## 2023-03-11 PROCEDURE — 94003 VENT MGMT INPAT SUBQ DAY: CPT

## 2023-03-11 PROCEDURE — 2580000003 HC RX 258: Performed by: INTERNAL MEDICINE

## 2023-03-11 PROCEDURE — 36415 COLL VENOUS BLD VENIPUNCTURE: CPT

## 2023-03-11 PROCEDURE — 94660 CPAP INITIATION&MGMT: CPT

## 2023-03-11 PROCEDURE — 83615 LACTATE (LD) (LDH) ENZYME: CPT

## 2023-03-11 PROCEDURE — 87040 BLOOD CULTURE FOR BACTERIA: CPT

## 2023-03-11 PROCEDURE — 83605 ASSAY OF LACTIC ACID: CPT

## 2023-03-11 PROCEDURE — 6370000000 HC RX 637 (ALT 250 FOR IP)

## 2023-03-11 PROCEDURE — 94640 AIRWAY INHALATION TREATMENT: CPT

## 2023-03-11 PROCEDURE — C9113 INJ PANTOPRAZOLE SODIUM, VIA: HCPCS | Performed by: INTERNAL MEDICINE

## 2023-03-11 PROCEDURE — 82962 GLUCOSE BLOOD TEST: CPT

## 2023-03-11 PROCEDURE — 84155 ASSAY OF PROTEIN SERUM: CPT

## 2023-03-11 PROCEDURE — 2500000003 HC RX 250 WO HCPCS

## 2023-03-11 PROCEDURE — 85025 COMPLETE CBC W/AUTO DIFF WBC: CPT

## 2023-03-11 PROCEDURE — 82805 BLOOD GASES W/O2 SATURATION: CPT

## 2023-03-11 PROCEDURE — 84100 ASSAY OF PHOSPHORUS: CPT

## 2023-03-11 PROCEDURE — 2000000000 HC ICU R&B

## 2023-03-11 PROCEDURE — 84443 ASSAY THYROID STIM HORMONE: CPT

## 2023-03-11 PROCEDURE — 99233 SBSQ HOSP IP/OBS HIGH 50: CPT | Performed by: INTERNAL MEDICINE

## 2023-03-11 PROCEDURE — 83735 ASSAY OF MAGNESIUM: CPT

## 2023-03-11 PROCEDURE — 80053 COMPREHEN METABOLIC PANEL: CPT

## 2023-03-11 RX ORDER — LORAZEPAM 0.5 MG/1
0.5 TABLET ORAL ONCE
Status: COMPLETED | OUTPATIENT
Start: 2023-03-11 | End: 2023-03-11

## 2023-03-11 RX ORDER — INSULIN LISPRO 100 [IU]/ML
0-8 INJECTION, SOLUTION INTRAVENOUS; SUBCUTANEOUS
Status: DISCONTINUED | OUTPATIENT
Start: 2023-03-11 | End: 2023-03-16 | Stop reason: HOSPADM

## 2023-03-11 RX ORDER — DEXTROSE MONOHYDRATE 100 MG/ML
INJECTION, SOLUTION INTRAVENOUS CONTINUOUS PRN
Status: DISCONTINUED | OUTPATIENT
Start: 2023-03-11 | End: 2023-03-16 | Stop reason: HOSPADM

## 2023-03-11 RX ORDER — LORAZEPAM 0.5 MG/1
0.5 TABLET ORAL NIGHTLY
Status: ON HOLD | COMMUNITY
End: 2023-03-16 | Stop reason: HOSPADM

## 2023-03-11 RX ORDER — INSULIN GLARGINE 100 [IU]/ML
8 INJECTION, SOLUTION SUBCUTANEOUS 2 TIMES DAILY
Status: DISCONTINUED | OUTPATIENT
Start: 2023-03-11 | End: 2023-03-16 | Stop reason: HOSPADM

## 2023-03-11 RX ORDER — LORAZEPAM 2 MG/ML
0.5 INJECTION INTRAMUSCULAR ONCE
Status: DISCONTINUED | OUTPATIENT
Start: 2023-03-11 | End: 2023-03-11

## 2023-03-11 RX ORDER — FLUCONAZOLE 2 MG/ML
400 INJECTION, SOLUTION INTRAVENOUS EVERY 24 HOURS
Status: DISCONTINUED | OUTPATIENT
Start: 2023-03-11 | End: 2023-03-11 | Stop reason: DRUGHIGH

## 2023-03-11 RX ORDER — QUETIAPINE FUMARATE 25 MG/1
25 TABLET, FILM COATED ORAL NIGHTLY
Status: DISCONTINUED | OUTPATIENT
Start: 2023-03-11 | End: 2023-03-16 | Stop reason: HOSPADM

## 2023-03-11 RX ORDER — INSULIN LISPRO 100 [IU]/ML
0-4 INJECTION, SOLUTION INTRAVENOUS; SUBCUTANEOUS NIGHTLY
Status: DISCONTINUED | OUTPATIENT
Start: 2023-03-11 | End: 2023-03-16 | Stop reason: HOSPADM

## 2023-03-11 RX ORDER — INSULIN GLARGINE 100 [IU]/ML
13 INJECTION, SOLUTION SUBCUTANEOUS 2 TIMES DAILY
COMMUNITY

## 2023-03-11 RX ORDER — FLUCONAZOLE 2 MG/ML
200 INJECTION, SOLUTION INTRAVENOUS EVERY 24 HOURS
Status: DISCONTINUED | OUTPATIENT
Start: 2023-03-11 | End: 2023-03-15

## 2023-03-11 RX ORDER — ASCORBIC ACID 500 MG
500 TABLET ORAL 2 TIMES DAILY
COMMUNITY

## 2023-03-11 RX ORDER — INSULIN LISPRO 100 [IU]/ML
0-4 INJECTION, SOLUTION INTRAVENOUS; SUBCUTANEOUS NIGHTLY
Status: DISCONTINUED | OUTPATIENT
Start: 2023-03-11 | End: 2023-03-11

## 2023-03-11 RX ORDER — INSULIN LISPRO 100 [IU]/ML
0-4 INJECTION, SOLUTION INTRAVENOUS; SUBCUTANEOUS
Status: DISCONTINUED | OUTPATIENT
Start: 2023-03-11 | End: 2023-03-11

## 2023-03-11 RX ADMIN — PROPOFOL 35 MCG/KG/MIN: 10 INJECTION, EMULSION INTRAVENOUS at 00:08

## 2023-03-11 RX ADMIN — Medication 10 ML: at 00:37

## 2023-03-11 RX ADMIN — HYDROCORTISONE SODIUM SUCCINATE 100 MG: 100 INJECTION, POWDER, FOR SOLUTION INTRAMUSCULAR; INTRAVENOUS at 17:48

## 2023-03-11 RX ADMIN — IPRATROPIUM BROMIDE 0.5 MG: 0.5 SOLUTION RESPIRATORY (INHALATION) at 14:30

## 2023-03-11 RX ADMIN — HYDROCORTISONE SODIUM SUCCINATE 100 MG: 100 INJECTION, POWDER, FOR SOLUTION INTRAMUSCULAR; INTRAVENOUS at 03:06

## 2023-03-11 RX ADMIN — IPRATROPIUM BROMIDE 0.5 MG: 0.5 SOLUTION RESPIRATORY (INHALATION) at 04:53

## 2023-03-11 RX ADMIN — LINEZOLID 600 MG: 600 TABLET, FILM COATED ORAL at 20:46

## 2023-03-11 RX ADMIN — BUDESONIDE 500 MCG: 0.5 SUSPENSION RESPIRATORY (INHALATION) at 18:08

## 2023-03-11 RX ADMIN — CEFEPIME 1000 MG: 1 INJECTION, POWDER, FOR SOLUTION INTRAMUSCULAR; INTRAVENOUS at 03:14

## 2023-03-11 RX ADMIN — ACETAMINOPHEN 650 MG: 325 TABLET ORAL at 00:36

## 2023-03-11 RX ADMIN — SODIUM CHLORIDE, PRESERVATIVE FREE 10 ML: 5 INJECTION INTRAVENOUS at 19:56

## 2023-03-11 RX ADMIN — SODIUM CHLORIDE, PRESERVATIVE FREE 10 ML: 5 INJECTION INTRAVENOUS at 17:48

## 2023-03-11 RX ADMIN — HEPARIN SODIUM 5000 UNITS: 5000 INJECTION INTRAVENOUS; SUBCUTANEOUS at 06:34

## 2023-03-11 RX ADMIN — CEFEPIME 1000 MG: 1 INJECTION, POWDER, FOR SOLUTION INTRAMUSCULAR; INTRAVENOUS at 14:40

## 2023-03-11 RX ADMIN — SODIUM CHLORIDE, PRESERVATIVE FREE 10 ML: 5 INJECTION INTRAVENOUS at 19:55

## 2023-03-11 RX ADMIN — ACETAMINOPHEN 650 MG: 325 TABLET ORAL at 16:34

## 2023-03-11 RX ADMIN — Medication 10 ML: at 19:54

## 2023-03-11 RX ADMIN — BUDESONIDE 500 MCG: 0.5 SUSPENSION RESPIRATORY (INHALATION) at 04:52

## 2023-03-11 RX ADMIN — ALBUTEROL SULFATE 2.5 MG: 2.5 SOLUTION RESPIRATORY (INHALATION) at 09:12

## 2023-03-11 RX ADMIN — FLUCONAZOLE IN SODIUM CHLORIDE 200 MG: 2 INJECTION, SOLUTION INTRAVENOUS at 09:24

## 2023-03-11 RX ADMIN — Medication 40 MG: at 12:06

## 2023-03-11 RX ADMIN — SODIUM CHLORIDE 25 ML: 9 INJECTION, SOLUTION INTRAVENOUS at 03:13

## 2023-03-11 RX ADMIN — PROPOFOL 35 MCG/KG/MIN: 10 INJECTION, EMULSION INTRAVENOUS at 05:30

## 2023-03-11 RX ADMIN — ALBUTEROL SULFATE 2.5 MG: 2.5 SOLUTION RESPIRATORY (INHALATION) at 04:52

## 2023-03-11 RX ADMIN — LEVOTHYROXINE SODIUM ANHYDROUS 100 MCG: 100 INJECTION, POWDER, LYOPHILIZED, FOR SOLUTION INTRAVENOUS at 06:34

## 2023-03-11 RX ADMIN — LINEZOLID 600 MG: 600 TABLET, FILM COATED ORAL at 07:57

## 2023-03-11 RX ADMIN — IPRATROPIUM BROMIDE 0.5 MG: 0.5 SOLUTION RESPIRATORY (INHALATION) at 09:12

## 2023-03-11 RX ADMIN — ACETAMINOPHEN 650 MG: 325 TABLET ORAL at 22:31

## 2023-03-11 RX ADMIN — LORAZEPAM 0.5 MG: 0.5 TABLET ORAL at 23:49

## 2023-03-11 RX ADMIN — INSULIN LISPRO 4 UNITS: 100 INJECTION, SOLUTION INTRAVENOUS; SUBCUTANEOUS at 20:51

## 2023-03-11 RX ADMIN — IPRATROPIUM BROMIDE 0.5 MG: 0.5 SOLUTION RESPIRATORY (INHALATION) at 18:07

## 2023-03-11 RX ADMIN — SODIUM CHLORIDE, PRESERVATIVE FREE 10 ML: 5 INJECTION INTRAVENOUS at 17:50

## 2023-03-11 RX ADMIN — APIXABAN 5 MG: 5 TABLET, FILM COATED ORAL at 12:06

## 2023-03-11 RX ADMIN — SODIUM CHLORIDE, PRESERVATIVE FREE 10 ML: 5 INJECTION INTRAVENOUS at 19:00

## 2023-03-11 RX ADMIN — APIXABAN 5 MG: 5 TABLET, FILM COATED ORAL at 20:46

## 2023-03-11 RX ADMIN — POTASSIUM & SODIUM PHOSPHATES POWDER PACK 280-160-250 MG 250 MG: 280-160-250 PACK at 16:35

## 2023-03-11 RX ADMIN — INSULIN GLARGINE 8 UNITS: 100 INJECTION, SOLUTION SUBCUTANEOUS at 20:52

## 2023-03-11 RX ADMIN — ALBUTEROL SULFATE 2.5 MG: 2.5 SOLUTION RESPIRATORY (INHALATION) at 14:30

## 2023-03-11 RX ADMIN — ALBUTEROL SULFATE 2.5 MG: 2.5 SOLUTION RESPIRATORY (INHALATION) at 18:07

## 2023-03-11 RX ADMIN — QUETIAPINE FUMARATE 25 MG: 25 TABLET ORAL at 23:50

## 2023-03-11 RX ADMIN — HYDROCORTISONE SODIUM SUCCINATE 100 MG: 100 INJECTION, POWDER, FOR SOLUTION INTRAMUSCULAR; INTRAVENOUS at 08:04

## 2023-03-11 RX ADMIN — DOPAMINE HYDROCHLORIDE IN DEXTROSE 5 MCG/KG/MIN: 3.2 INJECTION, SOLUTION INTRAVENOUS at 12:55

## 2023-03-11 RX ADMIN — INSULIN LISPRO 8 UNITS: 100 INJECTION, SOLUTION INTRAVENOUS; SUBCUTANEOUS at 16:57

## 2023-03-11 RX ADMIN — Medication 10 ML: at 07:57

## 2023-03-11 ASSESSMENT — PAIN DESCRIPTION - DESCRIPTORS: DESCRIPTORS: DISCOMFORT

## 2023-03-11 ASSESSMENT — PAIN DESCRIPTION - ORIENTATION
ORIENTATION: RIGHT
ORIENTATION: RIGHT

## 2023-03-11 ASSESSMENT — PULMONARY FUNCTION TESTS
PIF_VALUE: 24
PIF_VALUE: 21
PIF_VALUE: 22
PIF_VALUE: 21
PIF_VALUE: 24
PIF_VALUE: 22
PIF_VALUE: 21
PIF_VALUE: 21
PIF_VALUE: 12
PIF_VALUE: 22
PIF_VALUE: 22
PIF_VALUE: 12
PIF_VALUE: 11
PIF_VALUE: 22
PIF_VALUE: 21
PIF_VALUE: 11
PIF_VALUE: 22
PIF_VALUE: 20
PIF_VALUE: 21
PIF_VALUE: 21
PIF_VALUE: 22

## 2023-03-11 ASSESSMENT — PAIN SCALES - GENERAL
PAINLEVEL_OUTOF10: 10
PAINLEVEL_OUTOF10: 8
PAINLEVEL_OUTOF10: 0
PAINLEVEL_OUTOF10: 0

## 2023-03-11 ASSESSMENT — PAIN DESCRIPTION - PAIN TYPE: TYPE: CHRONIC PAIN

## 2023-03-11 ASSESSMENT — PAIN DESCRIPTION - LOCATION
LOCATION: LEG
LOCATION: LEG

## 2023-03-11 ASSESSMENT — PAIN DESCRIPTION - ONSET: ONSET: ON-GOING

## 2023-03-11 ASSESSMENT — PAIN DESCRIPTION - FREQUENCY: FREQUENCY: CONTINUOUS

## 2023-03-11 ASSESSMENT — PAIN - FUNCTIONAL ASSESSMENT: PAIN_FUNCTIONAL_ASSESSMENT: PREVENTS OR INTERFERES WITH ALL ACTIVE AND SOME PASSIVE ACTIVITIES

## 2023-03-11 NOTE — PROGRESS NOTES
[facility] MD King Tima Ray, MD Jimmie Chaparro, MD Kd Schultz, CNP   Joyce Devlin, ANP  Janie Stock, CNP  Progress Note    3/11/2023    SUBJECTIVE:   3/11: Seen in the ICU. Mechanically ventilated and sedated. FiO2 35 % PEEP 5. She opens eyes to voice, does not follow commands. Tolerated HD yesterday without complications. TSH improving. On dopamine infusion. PROBLEM LIST:    Principal Problem:    Myxedema coma ([address]  Resolved Problems:    * No resolved hospital problems.  *         DIET:    Diet NPO     MEDS (scheduled):    fluconazole  200 mg IntraVENous Q24H    pantoprazole (PROTONIX) 40 mg injection  40 mg IntraVENous Daily    hydrocortisone sodium succinate PF  100 mg IntraVENous Q8H    levothyroxine  100 mcg IntraVENous Daily    heparin (porcine)  5,000 Units SubCUTAneous 3 times per day    sodium chloride flush  10 mL IntraVENous 2 times per day    budesonide  0.5 mg Nebulization BID    albuterol  2.5 mg Nebulization Q4H WA    And    ipratropium  0.5 mg Nebulization Q4H WA    cefepime  1,000 mg IntraVENous Q12H    linezolid  600 mg Oral 2 times per day    [START ON 3/13/2023] midodrine  10 mg Per NG tube Q MWF       MEDS (infusions):   dextrose      sodium chloride Stopped (03/11/23 0314)    DOPamine 7.5 mcg/kg/min (03/11/23 0650)    propofol 35 mcg/kg/min (03/11/23 0650)    fentaNYL 75 mcg/hr (03/11/23 0650)       MEDS (prn):  glucose, dextrose bolus **OR** dextrose bolus, glucagon (rDNA), dextrose, sodium chloride flush, sodium chloride, promethazine **OR** ondansetron, polyethylene glycol, acetaminophen **OR** acetaminophen, fentaNYL **AND** fentaNYL    PHYSICAL EXAM:     Patient Vitals for the past 24 hrs:   BP Temp Temp src Pulse Resp SpO2 Height Weight   03/11/23 0922 -- -- -- -- -- -- 5' (1.524 m) --   03/11/23 0912 -- -- -- 90 18 100 % -- --   03/11/23 0600 (!) 152/82 -- -- 87 -- 100 % -- --   03/11/23 0503 -- -- -- 88 -- 100 % -- --   03/11/23 0500 (!) 157/76 100.2 °F (37.9 °C) Bladder 87 -- 100 % -- 211 lb 4.8 oz (95.8 kg)   03/11/23 0400 (!) 153/76 -- -- 89 -- 100 % -- --   03/11/23 0300 -- -- -- 89 -- 100 % -- --   03/11/23 0200 (!) 156/86 (!) 100.8 °F (38.2 °C) Bladder 88 -- 100 % -- --   03/11/23 0131 -- -- -- 89 -- 100 % -- --   03/11/23 0100 (!) 157/78 -- -- 91 -- 100 % -- --   03/11/23 0000 (!) 146/73 (!) 100.9 °F (38.3 °C) Bladder 90 -- 100 % -- --   03/10/23 2300 (!) 152/66 -- -- 88 -- 100 % -- --   03/10/23 2207 -- -- -- 83 -- 100 % -- --   03/10/23 2205 -- -- -- 81 -- 100 % -- --   03/10/23 2200 (!) 138/55 100 °F (37.8 °C) Bladder 74 -- 100 % -- --   03/10/23 2100 (!) 130/59 99.5 °F (37.5 °C) Bladder 67 -- 100 % -- --   03/10/23 2000 134/61 99 °F (37.2 °C) Bladder -- -- 100 % -- --   03/10/23 1900 135/62 -- -- 64 -- 100 % -- --   03/10/23 1830 122/80 -- -- 62 -- 100 % -- --   03/10/23 1745 -- -- -- 64 -- 100 % -- --   03/10/23 1730 (!) 132/51 -- -- 63 -- 100 % -- --   03/10/23 1715 -- -- -- 61 -- 100 % -- --   03/10/23 1700 (!) 134/47 98.2 °F (36.8 °C) Bladder 60 -- 100 % -- --   03/10/23 1645 (!) 116/51 -- -- 63 (P) 18 100 % -- --   03/10/23 1530 (!) 136/59 -- -- 68 -- 100 % -- --   03/10/23 1500 131/63 -- -- 67 -- 100 % -- --   03/10/23 1445 -- -- -- 68 -- 95 % -- --   03/10/23 1430 132/62 98.1 °F (36.7 °C) -- 74 -- 98 % -- 210 lb 12.2 oz (95.6 kg)   03/10/23 1415 (!) 80/55 -- -- 77 -- 97 % -- --   03/10/23 1400 (!) 79/61 -- -- 75 -- 97 % -- --   03/10/23 1350 85/61 98.1 °F (36.7 °C) -- 75 -- 96 % -- --   03/10/23 1345 85/61 -- -- 71 -- 98 % -- --   03/10/23 1330 (!) 89/58 -- -- 70 -- 99 % -- --   03/10/23 1320 (!) 90/57 -- -- 71 -- -- -- --   03/10/23 1315 (!) 90/57 -- -- 71 -- 100 % -- --   03/10/23 1301 91/60 -- -- 71 -- -- -- --   03/10/23 1300 91/60 -- -- 70 -- 100 % -- --   03/10/23 1245 (!) 92/59 -- -- 71 -- 100 % -- --   03/10/23 1235 84/66 -- -- 72 -- -- -- --   03/10/23 1230 84/66 -- -- 80 -- 100 % -- --   03/10/23 1215 93/66 -- -- 79 -- 100 % -- --   03/10/23 1200 101/60 98.1 °F (36.7 °C) Bladder 78 18 100 % -- --   03/10/23 1150 99/67 -- -- 75 -- -- -- --   03/10/23 1145 99/67 -- -- 76 20 100 % -- --   03/10/23 1130 108/61 -- -- 63 18 100 % -- --   03/10/23 1115 116/68 -- -- 64 -- 100 % -- --   03/10/23 1100 (!) 140/68 97.9 °F (36.6 °C) -- 77 18 100 % -- 216 lb 0.8 oz (98 kg)   @      Intake/Output Summary (Last 24 hours) at 3/11/2023 1044  Last data filed at 3/11/2023 0650  Gross per 24 hour   Intake 1709.93 ml   Output 3750 ml   Net -2040.07 ml         Wt Readings from Last 3 Encounters:   03/11/23 211 lb 4.8 oz (95.8 kg)   02/24/23 214 lb 15.2 oz (97.5 kg)   01/16/23 259 lb (117.5 kg)       Constitutional: ventilated and sedated   HEENT: NC/AT, EOMI, sclera and conjunctiva are clear and anicteric, mucus membranes moist. Oropharynx with ETT   Neck: Trachea midline, no JVD. RIJ tesio  Cardiovascular: S1, S2 regular rhythm, no murmur,or rub  Respiratory:  No crackles, no wheeze  Gastrointestinal:  Soft, nontender, distended, abdomen and abdominal panus swelling has improved (+1) NABS  Ext: trace dependent edema with skin wrinkling, feet warm  Skin: dry, no rash  Neuro: ventilated and sedated. Opens eyes to voice       DATA:    Recent Labs     03/10/23  0001 03/10/23  0440 03/11/23 0421   WBC 8.7 11.4 9.9   HGB 8.9* 10.1* 10.2*   HCT 35.5 40.1 35.9   MCV 98.3 97.3 87.8    247 189     Recent Labs     03/10/23  0001 03/10/23  0440 03/11/23 0421    138 135   K 4.0 4.2 3.7   CL 99 98 93*   CO2 31* 32* 27   MG 2.0 2.0 1.9   PHOS  --  2.8 2.0*   BUN 16 17 18   CREATININE 2.9* 2.8* 1.9*   ALT <5  --  <5   AST 6  --  7   BILITOT 0.5  --  1.0   ALKPHOS 81  --  94       Lab Results   Component Value Date    LABPROT 0.5 (H) 01/18/2023    LABPROT 0.5 01/18/2023          Assessment  Acute on chronic kidney disease requiring initiation of hemodialysis during a recent hospitalization. Hemodialysis on Mondays, Wednesdays, and Fridays. Chronic kidney disease stage III, baseline creatinine 1.4-1.7 mg/dL secondary to diabetic nephropathy and renal microvascular atherosclerotic disease.    Anemia due to CKD  Myxedema Coma   Acute hypercapnic respiratory failure      Urine output-750 cc  Follow closely for signs of recovery, making good UOP    Plan  Continue IHD support for solute and volume clearance on MWF  Ultrafiltration as warranted (no need for UF today)  No need for YVONNE, Hgb and Hct stable   10 mg midodrine on dialysis days, please give prior to dialysis   Fu blood cutlures   Follow labs  Monitor I&O  Continue intensive supportive care          Electronically signed by RASHARD Cobian CNP on 3/11/2023 at 10:44 AM

## 2023-03-11 NOTE — PROGRESS NOTES
Assessment:  Myxedema coma  Acute on chronic hypercapnic respiratory failure  Acute on chronic congestive heart failure-EF 65% January 18, 2023  Urinary tract infection hx VRE  ESRD, still makes urine  Pleural effusion possible healthcare associated pneumonia  Chronic renal failure, stage IV requiring hemodialysis  Elevated troponin (chronic)  Hypotension  Bradycardia    Plan:   Daily sedation vacation, followed by awakening trial and spontaneous breathing trial.  Cont synthroid, steroids  Dopamine, wean as tolerated goal map>65  trend lactic acid  S/P right-sided thoracentesis on March 10, 2023 ( 1100 ml , fluid is transudative.)  Continue empiric antibiotic coverage with Rocephin  Hemodialysis per nephrology  Blood cultures are negative to date  Follow-up culture results  DVT prophylaxis with heparin  Tube feeding if patient cannot be extubated today. ID is managing antibiotics  GI prophylaxis and DVT prophylaxis. History of Present Illness:   Patient is a 50-year-old female with a past medical history of acute on chronic congestive heart failure, atrial fibrillation on eliquis, asthma, coronary artery disease, chronic kidney disease requiring hemodialysis, diabetes mellitus, hyperlipidemia, Parkinson's disease, and obstructive sleep apnea. She was sent into the emergency department from the nursing home today for bradycardia, hypotension, general fatigue and weakness for the last couple days. She did receive dialysis yesterday. ED evaluation showed the patient to be hypotensive and bradycardic. Her proBNP greater than 70,000, elevated troponin of 169 but this was elevated during her last hospitalization. Elevated TSH at 115.3 and a low T4 1.2. Urinalysis was positive for urinary tract infection. Arterial blood gases showed a pH of 7.13, PCO2 of 94.3, PO2 81.9, and HCO3 of 32.1. She was placed on BiPAP.   She was given 1 dose of Rocephin for urinary tract infection, 1 dose of atropine for bradycardia and dopamine infusion started. She also received 50 mcg of Synthroid. March 10: intubated this am, on dopamine, awakes off sedation, wean fio2 50% as toerlated, HD per nephro, cont abx, steroids, synthroid, SBT when stable in am    March 11, 2023: Patient is awake and follows command of sedation. She underwent right-sided thoracentesis on March 10, 2023 and 1100 cc of fluid was removed. Fluid was transudative. She continues to be on 7.5 betty per KG per minute dopamine. She has no fever, chills, or rigors. She underwent hemodialysis yesterday.     Past Medical History:  Past Medical History:   Diagnosis Date    A-fib ([facility] [address]     Acute on chronic congestive heart failure (HCC)     Anxiety     Asthma     CAD (coronary artery disease) 01/21/2016    Cancer ([facility] [address]  breast ca 2006    right lumpectomy    Chronic kidney disease     nephrolithiasis    COPD exacerbation ([facility] [address] 10/12/2022    Depression     Diabetes mellitus ([facility] [address]     H/O mammogram     Hx MRSA infection     toe infection january 2012    Hyperlipidemia     Hypertension     Lateral epicondylitis     Morbid obesity (HCC)     SERENA on CPAP     Oxygen dependent     Parkinson's disease ([address]     Tubal ligation status       Past Surgical History:   Procedure Laterality Date    BREAST LUMPECTOMY      BREAST REDUCTION SURGERY      CARDIAC CATHETERIZATION  4/28/2014    Dr. Mathur Dub  01/11/2022    Dr. Franklin Resides  7/29/15    CT GUIDED CHEST TUBE  7/8/2022    CT GUIDED CHEST TUBE 7/8/2022 Yudith Flores MD [facility]    ENDOSCOPY, COLON, DIAGNOSTIC  7/19/15    GALLBLADDER SURGERY      IR PERC CATH PLEURAL DRAIN W/IMAG  2/14/2023    IR PERC CATH PLEURAL DRAIN W/IMAG 2/14/2023 [facility]    IR REMOVAL OF TUNNELED PLEURAL CATH W CUFF  2/23/2023    IR REMOVAL OF TUNNELED PLEURAL CATH W CUFF 2/23/2023 [facility]    LUMBAR LAMINECTOMY      TOE AMPUTATION      TONSILLECTOMY      UPPER GASTROINTESTINAL ENDOSCOPY      UPPER GASTROINTESTINAL ENDOSCOPY N/A 7/19/2022    EGD ESOPHAGOGASTRODUODENOSCOPY performed by Rita Jordan MD at [place] [address] History:   Unable to obtain    Allergies:         Ciprofloxacin, Codeine, and Digoxin and related    Social history:  Social History     Socioeconomic History    Marital status:       Spouse name: Not on file    Number of children: Not on file    Years of education: Not on file    Highest education level: Not on file   Occupational History    Not on file   Tobacco Use    Smoking status: Never    Smokeless tobacco: Never   Vaping Use    Vaping Use: Never used   Substance and Sexual Activity    Alcohol use: No    Drug use: No    Sexual activity: Never   Other Topics Concern    Not on file   Social History Narrative    Not on file     Social Determinants of Health     Financial Resource Strain: Not on file   Food Insecurity: Not on file   Transportation Needs: Not on file   Physical Activity: Not on file   Stress: Not on file   Social Connections: Not on file   Intimate Partner Violence: Not on file   Housing Stability: Not on file       Current Medications:     Current Facility-Administered Medications:     fluconazole (DIFLUCAN) in 0.9 % sodium chloride IVPB 200 mg, 200 mg, IntraVENous, Q24H, Abbie Estrada MD, Last Rate: 100 mL/hr at 03/11/23 0924, 200 mg at 03/11/23 0924    hydrocortisone sodium succinate PF (SOLU-CORTEF) injection 100 mg, 100 mg, IntraVENous, Q8H, RASHARD Hansen CNP, 100 mg at 03/11/23 0804    levothyroxine (SYNTHROID) injection 100 mcg, 100 mcg, IntraVENous, Daily, RASHARD Hansen CNP, 100 mcg at 03/11/23 0634    heparin (porcine) injection 5,000 Units, 5,000 Units, SubCUTAneous, 3 times per day, RASHARD Hansen CNP, 5,000 Units at 03/11/23 0634    sodium chloride flush 0.9 % injection 10 mL, 10 mL, IntraVENous, 2 times per day, Karel Padilla MD, 10 mL at 03/11/23 0757    sodium chloride flush 0.9 % injection 10 mL, 10 mL, IntraVENous, PRN, Edith Ashley MD    0.9 % sodium chloride infusion, , IntraVENous, PRN, Edith Ashley MD, Stopped at 03/11/23 0314    promethazine (PHENERGAN) tablet 12.5 mg, 12.5 mg, Oral, Q6H PRN **OR** ondansetron (ZOFRAN) injection 4 mg, 4 mg, IntraVENous, Q6H PRN, Edith Ashley MD    polyethylene glycol (GLYCOLAX) packet 17 g, 17 g, Oral, Daily PRN, Edith Ashley MD    acetaminophen (TYLENOL) tablet 650 mg, 650 mg, Oral, Q6H PRN, 650 mg at 03/11/23 0036 **OR** acetaminophen (TYLENOL) suppository 650 mg, 650 mg, Rectal, Q6H PRN, Edith Ashley MD    DOPamine (INTROPIN) 800 mg in dextrose 5 % 250 mL infusion, 1-20 mcg/kg/min, IntraVENous, Continuous, Sheila Fruit, APRN - CNP, Last Rate: 14.2 mL/hr at 03/11/23 0650, 7.5 mcg/kg/min at 03/11/23 0650    etomidate (AMIDATE) injection 20 mg, 20 mg, IntraVENous, Once, Sheila Fruit APRN - CNP    propofol injection, 5-50 mcg/kg/min, IntraVENous, Titrated, Sheila Fruit, APRN - CNP, Last Rate: 20.5 mL/hr at 03/11/23 0650, 35 mcg/kg/min at 03/11/23 0650    fentaNYL 10 mcg/ml in 0.9%  ml infusion,  mcg/hr, IntraVENous, Continuous, Last Rate: 7.5 mL/hr at 03/11/23 0650, 75 mcg/hr at 03/11/23 0650 **AND** fentaNYL bolus from bag, 50 mcg, IntraVENous, Q30 Min PRN, Sheila Fruit, APRN - CNP    budesonide (PULMICORT) nebulizer suspension 500 mcg, 0.5 mg, Nebulization, BID, Sheila Fruit, APRN - CNP, 500 mcg at 03/11/23 0452    albuterol (PROVENTIL) nebulizer solution 2.5 mg, 2.5 mg, Nebulization, Q4H WA, 2.5 mg at 03/11/23 0912 **AND** ipratropium (ATROVENT) 0.02 % nebulizer solution 0.5 mg, 0.5 mg, Nebulization, Q4H WA, RASHARD Rossi - CNP, 0.5 mg at 03/11/23 0912    cefepime (MAXIPIME) 1,000 mg in sodium chloride 0.9 % 50 mL IVPB ([id number]), 1,000 mg, IntraVENous, Q12H, Cristina Baum MD, Stopped at 03/11/23 0714    linezolid (ZYVOX) tablet 600 mg, 600 mg, Oral, 2 times per day, Yecenia Arias MD, 600 mg at 03/11/23 0757    [START ON 3/13/2023] midodrine (PROAMATINE) tablet 10 mg, 10 mg, Per NG tube, Q [facility] RASHARD Piña - CNP    Review of Systems:   Unable to assess due to being on mechanical ventilation. Physical Exam:   Vital Signs:  BP (!) 152/82   Pulse 90   Temp 100.2 °F (37.9 °C) (Bladder)   Resp 18   Ht 5' (1.524 m)   Wt 211 lb 4.8 oz (95.8 kg)   SpO2 100%   BMI 41.27 kg/m²     Input/Output: In: 1709.9 [I.V.:1014; NG/GT:80]  Out: 3750     Oxygen requirements: MV  Vent Information  Ventilator ID: [id number]  Vent Mode: AC/VC    General appearance: vented sedated  HEENT: Intubated  Neck: Supple, no jugular venous distension, lymphadenopathy, thyromegaly or carotid bruits  Chest: Diminished breath sounds right greater than left no wheezing, respiratory crackles bilateral bases posteriorly   cardiovascular: Normal S1 , S2, regular rate and rhythm, no murmur, rub or gallop  Abdomen: Normal sounds present, soft, lax with no tenderness, no hepatosplenomegaly, and no masses obese  Extremities: 1+ edema. Pulses are equally present. Skin: cool, dry  Neurologic: moves all 4 extr and follows command off sedation. Investigations:  Labs, radiological imaging and cardiac work up were reviewed    ICU Staff Physician note of personal involvement in Care  As the attending physician, I certify that I personally reviewed the patient's history and personally examined the patient to confirm the physical findings described above,  And that I reviewed the relevant imaging studies and available reports. I also discussed the differential diagnosis and all of the proposed management plans with the patient and individuals accompanying the patient to this visit. They had the opportunity to ask questions about the proposed management plans and to have those questions answered. This patient has a high probability of sudden, clinically significant deterioration, which requires the highest level of physician preparedness to intervene urgently. I managed/supervised life or organ supporting interventions that required frequent physician assessment. I devoted my full attention to the direct care of this patient for the amount of time indicated below. Time I spent with the family or surrogate(s) is included only if the patient was incapable of providing the necessary information or participating in medical decisions - Time devoted to teaching and to any procedures I billed separately is not included.      CRITICAL CARE TIME:  30 minutes              Electronically signed by Christi Donahue MD on 3/11/2023 at 10:32 AM

## 2023-03-11 NOTE — PROGRESS NOTES
Pharmacy Note - Renal dose adjustment made per P/T protocol    Your patient is on a medication that requires a renal dose adjustment. Renal Function Assessment:    Date Body Weight IBW  Adjusted BW SCr  CrCl Dialysis status   3/11/2023 211 lb 4.8 oz (95.8 kg)  Ideal body weight: 45.5 kg (100 lb 4.9 oz)  Adjusted ideal body weight: 65.6 kg (144 lb 11.3 oz) Serum creatinine: 1.9 mg/dL (H) 03/11/23 0421  Estimated creatinine clearance: 27 mL/min (A) HD     Estimated Creatinine Clearance: 27 mL/min (A) (based on SCr of 1.9 mg/dL (H)). Recent Labs     03/10/23  0001 03/10/23  0440 03/11/23 0421   BUN 16 17 18   CREATININE 2.9* 2.8* 1.9*       Pharmacy has renally dose-adjusted the following medication(s):    Date Original Order Renally Adjusted Order   3/11/2023 Fluconazole 400 mg IV once daily Fluconacole 200 mg IV once daily       These changes were made per protocol according to the [facility] Renal Function-Based Dose Adjustments Policy    *Please note this dose may need readjusted if your patient's renal function significantly improves. Please contact pharmacy with any questions regarding these changes.     Phi Be PharmD.  3/11/2023   8:48 AM

## 2023-03-11 NOTE — CONSULTS
Comprehensive Nutrition Assessment    Type and Reason for Visit:  Initial, Consult (Tube Feedings Recommendations)    Nutrition Recommendations/Plan:   Continue NPO status   Provide tube feeding recommendations per MD Consult. Recommend Vital HP (Peptide Based High Protein) at 35ml/h with 30ml flush q4h and protein modular twice daily. Total to provide 1048kcal, 125g protein, 881ml fluid. (Total w/ propofol = 1589kcal/day). Malnutrition Assessment:  Malnutrition Status: At risk for malnutrition (Comment) (03/11/23 9103)    Context:  Chronic Illness     Findings of the 6 clinical characteristics of malnutrition:  Energy Intake:  Unable to assess (intubated)  Weight Loss:  Unable to assess (d/t fluctuation in wt hx per EMR)     Body Fat Loss:  No significant body fat loss     Muscle Mass Loss:  No significant muscle mass loss    Fluid Accumulation:  Unable to assess     Strength:  Not Performed    Nutrition Assessment:    Pt admit w/ myxedema coma, acute respiratory failure now intubated/sedated, pleural effusions and possible HCAP. PMHx of ESRD on HD, CHF, Afib, CAD, DM, parkinson's disease, SERENA. Will provide tubefeeding recommendations per consult, will continue to monitor. Nutrition Related Findings:    abd soft, nontender, rounded, obese, hypoactive BS x4, edentulous, intubated/sedated, +1 non-pitting edema, I/O's -1.7L Wound Type: Pressure Injury, Unstageable (R groin area)       Current Nutrition Intake & Therapies:    Average Meal Intake: NPO  Diet NPO    Anthropometric Measures:  Height: 5' (152.4 cm)  Ideal Body Weight (IBW): 100 lbs (45 kg)    Admission Body Weight: 215 lb 6 oz (97.7 kg) (3/10 bed scale, first measured)  Current Body Weight: 211 lb 4.8 oz (95.8 kg) (3/11 bed scale), 211.3 % IBW.  Weight Source: Bed Scale  Current BMI (kg/m2): 41.3  Usual Body Weight:  (JOHN d/t wt fluctuations per EMR likely r/t fluid shifts from CHF/ESRD)  Weight Adjustment For: No Adjustment  BMI Categories: Obese Class 3 (BMI 40.0 or greater)    Estimated Daily Nutrient Needs:  Energy Requirements Based On: Formula  Weight Used for Energy Requirements: Current  Energy (kcal/day): 1500-1600kcal/day (PS10)  Weight Used for Protein Requirements: Ideal  Protein (g/day): 115-125g/day (2.5-2.7g/kg IBW)  Method Used for Fluid Requirements: 1 ml/kcal  Fluid (ml/day): per critical care    Nutrition Diagnosis:   Inadequate oral intake related to impaired respiratory function as evidenced by intubation, nutrition support - enteral nutrition, NPO or clear liquid status due to medical condition    Nutrition Interventions:   Food and/or Nutrient Delivery: Continue NPO, Start Tube Feeding (Recommend Vital HP at 35ml/h with 30ml flush q4h and protein modular twice daily. Total to provide 1048kcal, 125g protein,881ml fluid. (Total w/ propofol = 1589kcal/day))  Nutrition Education/Counseling: No recommendation at this time  Coordination of Nutrition Care: Continue to monitor while inpatient    Goals:  Goals: Initiate nutrition support, Tolerate nutrition support at goal rate, by next RD assessment    Nutrition Monitoring and Evaluation:   Behavioral-Environmental Outcomes: None Identified  Food/Nutrient Intake Outcomes: Enteral Nutrition Intake/Tolerance  Physical Signs/Symptoms Outcomes: Biochemical Data, Chewing or Swallowing, GI Status, Fluid Status or Edema, Hemodynamic Status, Nutrition Focused Physical Findings, Weight, Skin    Discharge Planning:     Too soon to determine     Harden Frankewing, RD  Contact:

## 2023-03-11 NOTE — PROGRESS NOTES
Department of Internal Medicine  General Internal Medicine  Attending Progress Note  Chief Complaint   Patient presents with    Hypotension     SUBJECTIVE:    Patient intubated and sedated.      OBJECTIVE      Medications    Current Facility-Administered Medications: fluconazole (DIFLUCAN) in 0.9 % sodium chloride IVPB 200 mg, 200 mg, IntraVENous, Q24H  hydrocortisone sodium succinate PF (SOLU-CORTEF) injection 100 mg, 100 mg, IntraVENous, Q8H  levothyroxine (SYNTHROID) injection 100 mcg, 100 mcg, IntraVENous, Daily  heparin (porcine) injection 5,000 Units, 5,000 Units, SubCUTAneous, 3 times per day  sodium chloride flush 0.9 % injection 10 mL, 10 mL, IntraVENous, 2 times per day  sodium chloride flush 0.9 % injection 10 mL, 10 mL, IntraVENous, PRN  0.9 % sodium chloride infusion, , IntraVENous, PRN  promethazine (PHENERGAN) tablet 12.5 mg, 12.5 mg, Oral, Q6H PRN **OR** ondansetron (ZOFRAN) injection 4 mg, 4 mg, IntraVENous, Q6H PRN  polyethylene glycol (GLYCOLAX) packet 17 g, 17 g, Oral, Daily PRN  acetaminophen (TYLENOL) tablet 650 mg, 650 mg, Oral, Q6H PRN **OR** acetaminophen (TYLENOL) suppository 650 mg, 650 mg, Rectal, Q6H PRN  DOPamine (INTROPIN) 800 mg in dextrose 5 % 250 mL infusion, 1-20 mcg/kg/min, IntraVENous, Continuous  etomidate (AMIDATE) injection 20 mg, 20 mg, IntraVENous, Once  propofol injection, 5-50 mcg/kg/min, IntraVENous, Titrated  fentaNYL 10 mcg/ml in 0.9%  ml infusion,  mcg/hr, IntraVENous, Continuous **AND** fentaNYL bolus from bag, 50 mcg, IntraVENous, Q30 Min PRN  budesonide (PULMICORT) nebulizer suspension 500 mcg, 0.5 mg, Nebulization, BID  albuterol (PROVENTIL) nebulizer solution 2.5 mg, 2.5 mg, Nebulization, Q4H WA **AND** ipratropium (ATROVENT) 0.02 % nebulizer solution 0.5 mg, 0.5 mg, Nebulization, Q4H WA  cefepime (MAXIPIME) 1,000 mg in sodium chloride 0.9 % 50 mL IVPB ([id number]), 1,000 mg, IntraVENous, Q12H  linezolid (ZYVOX) tablet 600 mg, 600 mg, Oral, 2 times per day  [START ON 3/13/2023] midodrine (PROAMATINE) tablet 10 mg, 10 mg, Per NG tube, Q MWF  Physical    VITALS:  BP (!) 152/82   Pulse 90   Temp 100.2 °F (37.9 °C) (Bladder)   Resp 18   Ht 5' (1.524 m)   Wt 211 lb 4.8 oz (95.8 kg)   SpO2 100%   BMI 41.27 kg/m²   CONSTITUTIONAL:   sedated and not in any apparent distress  EYES:  extra-ocular muscles intact and vision intact  ENT:  normocepalic, without obvious abnormality  NECK:  supple, symmetrical, trachea midline  LUNGS:  no increased work of breathing, no retractions, and clear to auscultation anteriorly  CARDIOVASCULAR:  normal apical pulses and normal S1 and S2  ABDOMEN:  soft, and non-tender  MUSCULOSKELETAL:  there is no redness, warmth, or swelling of the joints  NEUROLOGIC:  Mental Status Exam:  sedated  Data    CBC:   Lab Results   Component Value Date/Time    WBC 9.9 03/11/2023 04:21 AM    RBC 4.09 03/11/2023 04:21 AM    HGB 10.2 03/11/2023 04:21 AM    HCT 35.9 03/11/2023 04:21 AM    MCV 87.8 03/11/2023 04:21 AM    MCH 24.9 03/11/2023 04:21 AM    MCHC 28.4 03/11/2023 04:21 AM    RDW 19.8 03/11/2023 04:21 AM     03/11/2023 04:21 AM    MPV 10.2 03/11/2023 04:21 AM     BMP:    Lab Results   Component Value Date/Time     03/11/2023 04:21 AM    K 3.7 03/11/2023 04:21 AM    K 3.6 02/20/2023 01:42 PM    CL 93 03/11/2023 04:21 AM    CO2 27 03/11/2023 04:21 AM    BUN 18 03/11/2023 04:21 AM    LABALBU 4.1 03/11/2023 04:21 AM    CREATININE 1.9 03/11/2023 04:21 AM    CALCIUM 9.0 03/11/2023 04:21 AM    GFRAA >60 10/16/2022 09:20 AM    LABGLOM 27 03/11/2023 04:21 AM    GLUCOSE 211 03/11/2023 04:21 AM       ASSESSMENT AND PLAN      Myxedema coma: continue IV synthroid. On solu-cortef. Acute on chronic respiratory failure with Hypercapnia and hypoxemia: currently intubated, continue to monitor. ESRD on HD: Per nephro  shock: may be related to #1.    Congestive heart failure with diastolic dysfunction: not in exacerbation  Pleural Effusion: s/p thoracentesis with 1L of fluid removed. Awaiting culture. Prior thrombocytopenia: suspected to be heparin induced. Discontinue Heparin and started patient on eliquis. Will order scd until tube feeding is approved or initiated and Eliquis can be resumed  Hx of atrial Fibrillation: rate is well controlled  Hyperlipidemia: statin  DM type 2 Complicated with Nephropathy: BGL Is well controlled for now. Continue to monitor. Insulin coverage as needed if Hyperglycemia results.   ?HCAP: abx per ID

## 2023-03-11 NOTE — PROGRESS NOTES
NAME: Grace Dutton  MR:  [id number]  :   1950  Admit Date:  3/9/2023      This is a face to face encounter with [address] [facility] of this note, including Diagnosis,  Interval History, Past Medical/Surgical/Family/Social Histories, ROS, physical exam, and Assessment and Plan were copied and pasted from Previous. Updates have been made where noted and reflect current exam and medical decision making from the DOS of this encounter. CHIEF COMPLAINT     ID following for   Chief Complaint   Patient presents with    Hypotension     HISTORY OF PRESENT ILLNESS     Grace Dutton is a 67 y.o. female who presents with   Chief Complaint   Patient presents with    Hypotension     3/9/2023 and has  has a past medical history of A-fib ([address] Acute on chronic congestive heart failure ([address] Anxiety, Asthma, CAD (coronary artery disease), Cancer ([address] Chronic kidney disease, COPD exacerbation ([address] Depression, Diabetes mellitus ([address] H/O mammogram, Hx MRSA infection, Hyperlipidemia, Hypertension, Lateral epicondylitis, Morbid obesity ([address] SERENA on CPAP, Oxygen dependent, Parkinson's disease ([address] and Tubal ligation status. Pt seen and examined 23  In icu intubated febrile [id number]     No reported adverse drug reactions.   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]  Elevated troponin [R77.8]  Acute respiratory failure with hypercapnia (HCC) [J96.02]  Chronic renal failure, stage 4 (severe) (HCC) [N18.4]  Hypotension, unspecified hypotension type [I95.9]  Acute on chronic congestive heart failure, unspecified heart failure type ([address] [I50.9]    ID following for   Pt with respiratory failure   Right pleural effusion h/o + gram stain from pleural fluid  -1000 cc of clear yellow  color pleural fluid drained from patient  -resp cx gpc  -oral candidiasis  Kidney failure recently started on hemo   LEFT LE HEALED   CONT ATBX   Check cx  hydrocortisone sodium succinate PF (SOLU-CORTEF) injection 100 mg, Q8H     cefepime (MAXIPIME) 1,000 mg in sodium chloride 0.9 % 50 mL IVPB ([id number]), Q12H  linezolid (ZYVOX) tablet 600 mg, 2 times per day  [START ON 3/13/2023] midodrine (PROAMATINE) tablet 10 mg, Q MWF        BP (!) 152/82   Pulse 87   Temp 100.2 °F (37.9 °C) (Bladder)   Resp (P) 18   Wt 211 lb 4.8 oz (95.8 kg)   SpO2 100%   BMI 41.27 kg/m²   Temp  Av.2 °F (37.3 °C)  Min: 97.9 °F (36.6 °C)  Max: 100.9 °F (38.3 °C)  CONSTITUTIONAL:  no apparent distress, and appears stated age  ENT:  Normocephalic, atraumatic,     LUNGS:  No increased work of breathing,dec  to auscultation bilaterally, no crackles or wheezing vent  CARDIOVASCULAR:  Normal apical impulse, regular rate and rhythm, normal S1 and S2,  no murmur noted  ABDOMEN:  normal bowel sounds, soft, non-distended, non-tender  MUSCULOSKELETAL:  There is no redness, warmth, or swelling  BLE. NEUROLOGIC:  sedated  SKIN:  normal skin color, texture, turgor and no rashes  Lines:       Central Venous Catheter:  CVC Triple Lumen 03/10/23 Right Femoral (Active)   Central Line Being Utilized Yes 23   Criteria for Appropriate Use Hemodynamically unstable, requiring monitoring lines, vasopressors, or volume resuscitation 23   Site Assessment Clean, dry & intact 23   Phlebitis Assessment No symptoms 23   Infiltration Assessment 0 23   Color/Movement/Sensation Capillary refill less than 3 sec 23   Proximal Lumen Color/Status Blue; Infusing;Flushed;Brisk blood return 23 040   Medial Lumen Status White; Infiltrated; Flushed;Brisk blood return 23   Distal Lumen Color/Status Red; Infusing;Flushed;Brisk blood return 23 040   [address] checked and tightened 23   Dressing Type Transparent; Antimicrobial 23   Dressing Status Clean, dry & intact 03/11/23 0400   Dressing Intervention New 03/10/23 1600           Peripheral Intravenous Line:  Peripheral IV 03/10/23 Left Antecubital (Active)   Site Assessment Clean;Dry; Intact 03/11/23 0400   Line Status Normal saline locked; Flushed;Blood return noted 03/11/23 0400   Line Care Connections checked and tightened 03/11/23 0400   Phlebitis Assessment No symptoms 03/11/23 0400   Infiltration Assessment 0 03/11/23 0400   Alcohol Cap Used No 03/11/23 0400   Dressing Status Dry; Intact 03/11/23 0400   Dressing Type Transparent 03/11/23 0400   Dressing Intervention New 03/10/23 1600           Drain(s):  NG/OG/NJ/NE Tube Nasogastric 14 fr Left nostril (Active)   Surrounding Skin Clean, dry & intact 03/10/23 2151   Securement device Tape 03/10/23 2151   Status Suction-low intermittent 03/10/23 2151   Placement Verified X-Ray (Initial) 03/10/23 2151   NG/OG/NJ/NE External Measurement (cm) 70 cm 03/10/23 2151   Drainage Appearance Bile 03/10/23 2151   Free Water/Flush (mL) 80 mL 03/10/23 2151   Output (mL) 200 ml 03/10/23 1713       Urinary Catheter 03/10/23 Diaz-Temperature (Active)   $ Urethral catheter insertion $ Not inserted for procedure 03/10/23 0150   Catheter Indications Need for fluid volume management of the critically ill patient in a critical care setting 03/11/23 0400   Urine Color Straw 03/11/23 0400   Urine Appearance Clear 03/11/23 0400   Collection Container Standard 03/11/23 0400   Securement Method Leg strap 03/11/23 0400   Catheter Care  Soap and water 03/10/23 0800   Status Draining 03/11/23 0400   Output (mL) 450 mL 03/11/23 0400        Microbiology:   Recent Labs     03/10/23  0001   COVID19 Not Detected     Lab Results   Component Value Date/Time    BC 5 Days no growth 01/17/2023 05:23 PM    BC 5 Days no growth 10/12/2022 04:59 PM    BC 5 Days no growth 10/06/2022 09:47 AM    BLOODCULT2 5 Days no growth 01/17/2023 05:23 PM    BLOODCULT2 5 Days no growth 10/12/2022 04:59 PM    BLOODCULT2 5 Days no growth 10/05/2022 11:19 AM    ORG Enterococcus faecium 01/18/2023 03:26 AM    ORG Staphylococcus epidermidis 01/17/2023 10:10 PM    ORG Escherichia coli 10/05/2022 11:19 AM     CULTURE, RESPIRATORY   Date Value Ref Range Status   07/06/2022 Oral Pharyngeal Shavon absent (A)  Final   07/06/2022 Light growth  Final        WOUND/ABSCESS   Date Value Ref Range Status   01/17/2023 Light growth  Final   07/05/2022 Heavy growth  Final   07/05/2022 Heavy growth  Final     Smear, Respiratory   Date Value Ref Range Status   03/10/2023   Final    Moderate Polymorphonuclear leukocytes  Few Epithelial cells  Rare yeast  Rare Gram positive cocci           Component Value Date/Time    AFBCX No growth after 6 weeks of incubation. 10/18/2022 1106    AFBCX No growth after 6 weeks of incubation. 07/08/2022 1546    FUNGSM No Fungal elements seen 07/08/2022 1546    LABFUNG No growth after 4 weeks of incubation.  07/08/2022 1546       MRSA Culture Only   Date Value Ref Range Status   01/17/2023 Methicillin resistant Staph aureus not isolated  Final       LABS:  Recent Labs     03/10/23  0001 03/10/23  0440 03/11/23  0421   WBC 8.7 11.4 9.9   RBC 3.61 4.12 4.09   HGB 8.9* 10.1* 10.2*   HCT 35.5 40.1 35.9   MCV 98.3 97.3 87.8   MCH 24.7* 24.5* 24.9*   MCHC 25.1* 25.2* 28.4*   RDW 20.8* 20.8* 19.8*    247 189   MPV 11.0 10.5 10.2     Recent Labs     03/10/23  0001 03/10/23  0440 03/11/23  0421    138 135   K 4.0 4.2 3.7   CL 99 98 93*   CO2 31* 32* 27   BUN 16 17 18   CREATININE 2.9* 2.8* 1.9*   GLUCOSE 85 141* 211*   PROT 6.0*  --  6.4  6.5   LABALBU 3.8  --  4.1   CALCIUM 8.7 9.3 9.0   BILITOT 0.5  --  1.0   ALKPHOS 81  --  94   AST 6  --  7   ALT <5  --  <5     Lab Results   Component Value Date    SEDRATE 6 07/06/2022    SEDRATE 15 10/24/2021    SEDRATE 115 (H) 02/03/2021     Lab Results   Component Value Date    CRP 5.0 (H) 07/06/2022    CRP 0.7 (H) 10/25/2021    CRP 13.5 (H) 02/03/2021     Lab Results   Component Value Date    PROCAL 0.40 (H) 03/10/2023    PROCAL 0.89 (H) 02/16/2023    PROCAL 0.33 (H) 01/18/2023     Recent Labs     03/10/23  0001 03/11/23  0421   LDH  --  239*   INR 2.5  --    PROTIME 29.2*  --    AST 6 7   ALT <5 <5       REVIEW OF SYSTEMS     As stated above in the chief complaint, otherwise negative.   CURRENT MEDICATIONS     Current Facility-Administered Medications:     hydrocortisone sodium succinate PF (SOLU-CORTEF) injection 100 mg, 100 mg, IntraVENous, Q8H, RASHARD Pedersen CNP, 100 mg at 03/11/23 0804    levothyroxine (SYNTHROID) injection 100 mcg, 100 mcg, IntraVENous, Daily, RASHARD Pedersen CNP, 100 mcg at 03/11/23 7541    heparin (porcine) injection 5,000 Units, 5,000 Units, SubCUTAneous, 3 times per day, RASHARD Pedersen CNP, 5,000 Units at 03/11/23 0634    sodium chloride flush 0.9 % injection 10 mL, 10 mL, IntraVENous, 2 times per day, Jackson Brewer MD, 10 mL at 03/11/23 0757    sodium chloride flush 0.9 % injection 10 mL, 10 mL, IntraVENous, PRN, Jackson Brewer MD    0.9 % sodium chloride infusion, , IntraVENous, PRN, Jackson Brewer MD, Stopped at 03/11/23 0314    promethazine (PHENERGAN) tablet 12.5 mg, 12.5 mg, Oral, Q6H PRN **OR** ondansetron (ZOFRAN) injection 4 mg, 4 mg, IntraVENous, Q6H PRN, Jackson Brewer MD    polyethylene glycol (GLYCOLAX) packet 17 g, 17 g, Oral, Daily PRN, Jackson Brewer MD    acetaminophen (TYLENOL) tablet 650 mg, 650 mg, Oral, Q6H PRN, 650 mg at 03/11/23 0036 **OR** acetaminophen (TYLENOL) suppository 650 mg, 650 mg, Rectal, Q6H PRN, Jackson Brewer MD    DOPamine (INTROPIN) 800 mg in dextrose 5 % 250 mL infusion, 1-20 mcg/kg/min, IntraVENous, Continuous, RASHARD Pedersen CNP, Last Rate: 14.2 mL/hr at 03/11/23 0650, 7.5 mcg/kg/min at 03/11/23 0650    etomidate (AMIDATE) injection 20 mg, 20 mg, IntraVENous, Once, RASHARD Pedersen CNP    propofol injection, 5-50 mcg/kg/min, IntraVENous, Titrated, RASHARD Pedersen CNP, Last Rate: 20.5 mL/hr at 03/11/23 0650, 35 mcg/kg/min at 03/11/23 0650    fentaNYL 10 mcg/ml in 0.9%  ml infusion,  mcg/hr, IntraVENous, Continuous, Last Rate: 7.5 mL/hr at 03/11/23 0650, 75 mcg/hr at 03/11/23 0650 **AND** fentaNYL bolus from bag, 50 mcg, IntraVENous, Q30 Min PRN, RASHARD Crowell CNP    budesonide (PULMICORT) nebulizer suspension 500 mcg, 0.5 mg, Nebulization, BID, RASHARD Crowell - CNP, 500 mcg at 03/11/23 0452    albuterol (PROVENTIL) nebulizer solution 2.5 mg, 2.5 mg, Nebulization, Q4H WA, 2.5 mg at 03/11/23 0452 **AND** ipratropium (ATROVENT) 0.02 % nebulizer solution 0.5 mg, 0.5 mg, Nebulization, Q4H WA, RASHARD Crowell CNP, 0.5 mg at 03/11/23 0453    cefepime (MAXIPIME) 1,000 mg in sodium chloride 0.9 % 50 mL IVPB ([id number]), 1,000 mg, IntraVENous, Q12H, Cristina Baum MD, Stopped at 03/11/23 0714    linezolid (ZYVOX) tablet 600 mg, 600 mg, Oral, 2 times per day, Daily Rodriguez MD, 600 mg at 03/11/23 0757    [START ON 3/13/2023] midodrine (PROAMATINE) tablet 10 mg, 10 mg, Per NG tube, Q MWF, RASHARD Meza CNP  DIAGNOSTIC RESULTS   Radiology:  CT CHEST WO CONTRAST    Result Date: 3/11/2023  EXAMINATION: CT OF THE CHEST WITHOUT CONTRAST 3/10/2023 4:39 pm TECHNIQUE: CT of the chest was performed without the administration of intravenous contrast. Multiplanar reformatted images are provided for review. Automated exposure control, iterative reconstruction, and/or weight based adjustment of the mA/kV was utilized to reduce the radiation dose to as low as reasonably achievable. COMPARISON: Radiographs of the same day. HISTORY: ORDERING SYSTEM PROVIDED HISTORY: Pleural effusion TECHNOLOGIST PROVIDED HISTORY: Reason for exam:->Pleural effusion FINDINGS: Mediastinum: Tunnel right jugular hemodialysis catheter tip well positioned proximal right atrium. Endotracheal tube tip with good position 2.6 cm above the juan francisco. Gastric tube with good position proximal side hole beneath the hemidiaphragms.   Normal caliber and contour of the thoracic aorta. No mediastinal adenopathy detected. Moderate cardiomegaly. Small pericardial effusion. Lungs/pleura: Moderate right and small left pleural effusions with associated compressive atelectasis. Mild re-expansion edema through the right lung, status post thoracentesis noted. Elevated right hemidiaphragm with moderate perihepatic ascites observed in the upper abdomen. Upper Abdomen: Moderate mainly perihepatic ascites. Gastric tube with good positioning. Soft Tissues/Bones: No acute osseous or body wall soft tissue abnormalities. 1. Moderate right and small left pleural effusions with associated compressive atelectasis. 2. Elevation of the right hemidiaphragm with underlying moderate perihepatic ascites. 3. Moderate cardiomegaly and small pericardial effusion. 4. Endotracheal tube, gastric tube, and right jugular tunneled implanted hemodialysis catheter with good positioning. RECOMMENDATIONS: Careful clinical correlation and follow up recommended. IR FLUORO GUIDED CVA DEVICE PLMT/REPLACE/REMOVAL    Result Date: 2/9/2023  PROCEDURE: IR FLUOROSCOPY GUIDED CENTRAL VENOUS ACCESS DEVICE PLACEMENT; US GUIDED VASCULAR ACCESS MODERATE CONSCIOUS SEDATION 2/9/2023 HISTORY: ORDERING SYSTEM PROVIDED HISTORY: Tunneled HD line along with separate iv access TECHNOLOGIST PROVIDED HISTORY: Reason for exam:->Tunneled HD line along with separate iv access TECHNIQUE: Ultrasound and fluoroscopic guided CONTRAST: None SEDATION: Moderate sedation was ordered and supervised by the attending with physician face-to-face monitoring. Medications were provided and recorded by Radiology nurses. The administration, documentation and monitoring of IV conscious sedation under my direct supervision for time frame of 20 minutes. 50 mcg of IV fentanyl was administered.   Interventional radiology nursing staff monitored the patient the throughout the exam. FLUOROSCOPY DOSE AND TYPE: Radiation Exposure Indices: Fluoroscopy time equals 0.3 minutes. Total dose equals 3.29 mGy DESCRIPTION OF PROCEDURE: Informed consent was obtained after a detailed explanation of the procedure including risks, benefits, and alternatives. Universal protocol was observed. Sterile gowns, masks, hats and gloves utilized for maximal sterile barrier. FINDINGS: The patient's neck was prepped and draped in a sterile fashion using maximum sterile barrier technique. Ultrasound images demonstrate a patent right internal jugular vein. Following the uneventful administration of lidocaine using ultrasound guidance I introduced a micro puncture needle into the right internal jugular vein. Through the micro needle a microwire was advanced. Over the microwire a micro sheath was placed. Through the micro sheath an Amplatz wire was advanced into the superior vena cava. 2 dilators were used to dilate a tract into the right internal jugular vein. I then anesthetized a tract along the chest wall measuring 10 cm. Using a blunt tunneling device I tunneled the catheter to the dilator within the right internal jugular vein. Over the Amplatz wire peel-away sheath was placed. Through the peel-away sheath the dual lumen 15 Arabic 28 cm dialysis catheter was placed. The catheter tip is positioned at the SVC right atrial junction. The patient tolerated the procedure well and there were no complications. The catheter was then secured to the skin with 2-0 silk suture. The incision overlying the right internal jugular vein was also sutured with 2-0 silk suture. Successful placement of a tunneled dialysis catheter via the right internal jugular vein. Administration of conscious sedation. XR CHEST PORTABLE    Result Date: 3/10/2023  EXAMINATION: ONE XRAY VIEW OF THE CHEST; ONE SUPINE XRAY VIEW(S) OF THE ABDOMEN 3/10/2023 5:49 am COMPARISON: 4 hours prior.  HISTORY: ORDERING SYSTEM PROVIDED HISTORY: ett placement TECHNOLOGIST PROVIDED HISTORY: Reason for exam:->ett placement; ORDERING SYSTEM PROVIDED HISTORY: Confirmation of course of NG/OG/NE tube and location of tip of tube TECHNOLOGIST PROVIDED HISTORY: Reason for exam:->Confirmation of course of NG/OG/NE tube and location of tip of tube Portable? ->Yes FINDINGS: Endotracheal tube tip with good positioning 3.7 cm above the juan francisco. Gastric tube with good position, proximal side hole beneath the hemidiaphragms. Right jugular hemodialysis catheter remains with good positioning. Obscuration of the right cardiac base and right hemidiaphragm. Improving aeration right base. Small right pleural effusion noted. No pneumothorax. Body wall soft tissues unremarkable. Osseous thorax intact. 1. Endotracheal tube and gastric tube with good positioning. 2. Right jugular hemodialysis catheter remains with good positioning. 3. Improved aeration of the right base with small right pleural effusion now evident. RECOMMENDATION: Careful clinical correlation and follow up recommended. XR CHEST PORTABLE    Result Date: 3/10/2023  EXAMINATION: ONE XRAY VIEW OF THE CHEST 3/9/2023 11:39 pm COMPARISON: Chest one view, 02/23/2023 HISTORY: ORDERING SYSTEM PROVIDED HISTORY: hypotension TECHNOLOGIST PROVIDED HISTORY: Reason for exam:->hypotension FINDINGS: Lines, tubes, and devices: There is stable position of right internal jugular tip localized at the atriocaval junction. Lungs and pleura: There is mild pulmonary vascular congestion and persistent small right pleural effusion. There is interval worsening atelectasis in the right middle lobe and right upper lobe. Right lower lobe atelectasis and/or pneumonitis persists. No pneumothorax. Cardiomediastinal silhouette: The mediastinum is stable. The heart size is normal. Bones and soft tissues: There are surgical clips in right axilla. Surgical clips are superimposed over right upper quadrant. Mild pulmonary vascular congestion.  Interval worsening of right upper lobe and right middle lobe atelectasis. Persistent right lower lobe atelectasis and/or pneumonitis. Small right pleural effusion. XR CHEST PORTABLE    Result Date: 2/23/2023  EXAMINATION: ONE XRAY VIEW OF THE CHEST 2/23/2023 3:58 pm COMPARISON: February 23, 2023 HISTORY: ORDERING SYSTEM PROVIDED HISTORY: post procedure TECHNOLOGIST PROVIDED HISTORY: Reason for exam:->post procedure FINDINGS: Cardiomegaly. Lungs clear with mild right basilar atelectasis. No pneumothorax or effusion. Osseous thorax intact. Right jugular hemodialysis catheter tip remains well positioned at the cavoatrial junction. No acute disease. RECOMMENDATION: Careful clinical correlation and follow up recommended. XR CHEST PORTABLE    Result Date: 2/22/2023  EXAMINATION: ONE XRAY VIEW OF THE CHEST 2/22/2023 2:36 pm COMPARISON: 10/16/2023. HISTORY: ORDERING SYSTEM PROVIDED HISTORY: pleural effusion evaluation TECHNOLOGIST PROVIDED HISTORY: Reason for exam:->pleural effusion evaluation FINDINGS: The lungs are without acute focal process. There is no effusion or pneumothorax. Cardiomegaly. The osseous structures are without acute process. Right-sided central line catheter at the caval atrial junction. No sizable pleural effusion. XR CHEST PORTABLE    Result Date: 2/16/2023  EXAMINATION: ONE XRAY VIEW OF THE CHEST 2/16/2023 6:33 am COMPARISON: 02/15/2023 HISTORY: ORDERING SYSTEM PROVIDED HISTORY: F/U pleural effusion TECHNOLOGIST PROVIDED HISTORY: Reason for exam:->F/U pleural effusion FINDINGS: Moderate cardiomegaly is unchanged. There is mild pulmonary venous congestion. Right IJ dialysis catheter is stable. Prior noted infiltrate versus atelectasis at the left lung base has resolved. There is improved aeration the right upper lobe with minimal residual apical density. No pneumothorax is identified. Bony structures are unremarkable. Improvement in bilateral lung infiltrates as described.      XR CHEST PORTABLE    Result Date: 2/15/2023  EXAMINATION: ONE XRAY VIEW OF THE CHEST 2/15/2023 5:28 am COMPARISON: 14 February 2023 HISTORY: ORDERING SYSTEM PROVIDED HISTORY: SOB TECHNOLOGIST PROVIDED HISTORY: Reason for exam:->SOB FINDINGS: Unchanged right-sided central venous catheter. A right pleural effusion appears diminished. There is now new opacity at the right upper lobe which could be infiltrate and or atelectasis. There is unchanged infiltrate and or atelectasis at the left lower lobe is well. Diminished right pleural effusion. New infiltrate and or atelectasis at the right upper lobe. Stable infiltrate and or atelectasis at the left lower lobe. XR CHEST 1 VIEW    Result Date: 2/14/2023  EXAMINATION: ONE XRAY VIEW OF THE CHEST 2/14/2023 3:28 pm COMPARISON: 02/09/2023 HISTORY: ORDERING SYSTEM PROVIDED HISTORY: Post Chest Tube Placement TECHNOLOGIST PROVIDED HISTORY: Reason for exam:->Post Chest Tube Placement FINDINGS: There is cardiomegaly. Large pleural effusion with infiltrates and atelectasis in the right lung is identified with small amount of ventilated right upper lobe. There is infiltrates and effusions left base as well. A right chest tube is noted in the right lung base which may be partially kinked. There is a right IJ dialysis catheter. Stable abnormal chest with slightly improved ventilation of the right lung with persistent large right pleural effusion with infiltrates and atelectasis in the right lung base and to a lesser extent in the left base. Right chest tube which may be partially kinked. There is no pneumothorax. XR ABDOMEN FOR NG/OG/NE TUBE PLACEMENT    Result Date: 3/10/2023  EXAMINATION: ONE XRAY VIEW OF THE CHEST; ONE SUPINE XRAY VIEW(S) OF THE ABDOMEN 3/10/2023 5:49 am COMPARISON: 4 hours prior.  HISTORY: ORDERING SYSTEM PROVIDED HISTORY: ett placement TECHNOLOGIST PROVIDED HISTORY: Reason for exam:->ett placement; ORDERING SYSTEM PROVIDED HISTORY: Confirmation of course of NG/OG/NE tube and location of tip of tube TECHNOLOGIST PROVIDED HISTORY: Reason for exam:->Confirmation of course of NG/OG/NE tube and location of tip of tube Portable? ->Yes FINDINGS: Endotracheal tube tip with good positioning 3.7 cm above the juan francisco. Gastric tube with good position, proximal side hole beneath the hemidiaphragms. Right jugular hemodialysis catheter remains with good positioning. Obscuration of the right cardiac base and right hemidiaphragm. Improving aeration right base. Small right pleural effusion noted. No pneumothorax. Body wall soft tissues unremarkable. Osseous thorax intact. 1. Endotracheal tube and gastric tube with good positioning. 2. Right jugular hemodialysis catheter remains with good positioning. 3. Improved aeration of the right base with small right pleural effusion now evident. RECOMMENDATION: Careful clinical correlation and follow up recommended. IR ULTRASOUND GUIDANCE VASCULAR ACCESS    Result Date: 2/9/2023  PROCEDURE: IR FLUOROSCOPY GUIDED CENTRAL VENOUS ACCESS DEVICE PLACEMENT; US GUIDED VASCULAR ACCESS MODERATE CONSCIOUS SEDATION 2/9/2023 HISTORY: ORDERING SYSTEM PROVIDED HISTORY: Tunneled HD line along with separate iv access TECHNOLOGIST PROVIDED HISTORY: Reason for exam:->Tunneled HD line along with separate iv access TECHNIQUE: Ultrasound and fluoroscopic guided CONTRAST: None SEDATION: Moderate sedation was ordered and supervised by the attending with physician face-to-face monitoring. Medications were provided and recorded by Radiology nurses. The administration, documentation and monitoring of IV conscious sedation under my direct supervision for time frame of 20 minutes. 50 mcg of IV fentanyl was administered. Interventional radiology nursing staff monitored the patient the throughout the exam. FLUOROSCOPY DOSE AND TYPE: Radiation Exposure Indices: Fluoroscopy time equals 0.3 minutes.   Total dose equals 3.29 mGy DESCRIPTION OF PROCEDURE: Informed consent was obtained after a detailed explanation of the procedure including risks, benefits, and alternatives. Universal protocol was observed. Sterile gowns, masks, hats and gloves utilized for maximal sterile barrier. FINDINGS: The patient's neck was prepped and draped in a sterile fashion using maximum sterile barrier technique. Ultrasound images demonstrate a patent right internal jugular vein. Following the uneventful administration of lidocaine using ultrasound guidance I introduced a micro puncture needle into the right internal jugular vein. Through the micro needle a microwire was advanced. Over the microwire a micro sheath was placed. Through the micro sheath an Amplatz wire was advanced into the superior vena cava. 2 dilators were used to dilate a tract into the right internal jugular vein. I then anesthetized a tract along the chest wall measuring 10 cm. Using a blunt tunneling device I tunneled the catheter to the dilator within the right internal jugular vein. Over the Amplatz wire peel-away sheath was placed. Through the peel-away sheath the dual lumen 15 [place] 28 cm dialysis catheter was placed. The catheter tip is positioned at the SVC right atrial junction. The patient tolerated the procedure well and there were no complications. The catheter was then secured to the skin with 2-0 silk suture. The incision overlying the right internal jugular vein was also sutured with 2-0 silk suture. Successful placement of a tunneled dialysis catheter via the right internal jugular vein. Administration of conscious sedation.      IR GUIDED THORACENTESIS PLEURAL    Result Date: 3/10/2023  PROCEDURE: ULTRASOUNDGUIDED RIGHT THORACENTESIS 3/10/2023 HISTORY: ORDERING SYSTEM PROVIDED HISTORY: right pleural effusion/right thoracentesis, send labs please TECHNOLOGIST PROVIDED HISTORY: right pleural effusion/right thoracentesis, send labs please Reason for exam:->right pleural effusion/right thoracentesis, send labs please Which side should the procedure be performed?->Right TECHNIQUE: Informed consent was obtained after a detailed explanation of the procedure including risks, benefits, and alternatives. Universal protocol was performed. The right chest was prepped and draped in sterile fashion and local anesthesia was achieved with lidocaine. An 8 [place] needle sheath was advanced under ultrasound guidance into pleural effusion and thoracentesis was performed. The patient tolerated the procedure well. FINDINGS: A total of 1000 cc was removed. Successful ultrasound guided thoracentesis. IR GUIDED THORACENTESIS PLEURAL    Result Date: 2/14/2023  PROCEDURE: ULTRASOUNDGUIDED RIGHT THORACENTESIS 2/14/2023 HISTORY: ORDERING SYSTEM PROVIDED HISTORY: Pleural Effusion TECHNOLOGIST PROVIDED HISTORY: Reason for exam:->Pleural Effusion Which side should the procedure be performed?->Right TECHNIQUE: Informed consent was obtained after a detailed explanation of the procedure including risks, benefits, and alternatives. Universal protocol was performed. The right chest was prepped and draped in sterile fashion and local anesthesia was achieved with lidocaine. An 8 [place] needle sheath was advanced under ultrasound guidance into pleural effusion and thoracentesis was performed. The patient tolerated the procedure well. FINDINGS: A total of 1000 cc was removed. Successful ultrasound guided thoracentesis.      IR GUIDED PERC PLEURAL DRAIN W CATH INSERT    Result Date: 2/14/2023  PROCEDURE: IR PERC CATH PLEURAL DRAIN W/IMAG 2/14/2023 HISTORY: ORDERING SYSTEM PROVIDED HISTORY: Recurrent pleural effusion TECHNOLOGIST PROVIDED HISTORY: Reason for exam:->Recurrent pleural effusion Which side should the procedure be performed?->Right TECHNIQUE: Ultrasound-guided CONTRAST: None SEDATION: None FLUOROSCOPY DOSE AND TYPE: None DESCRIPTION OF PROCEDURE: Informed consent was obtained after a detailed explanation of the procedure including risks, benefits, and alternatives. Universal protocol was observed. Sterile gowns, masks, hats and gloves utilized for maximal sterile barrier. FINDINGS: Ultrasound images of the right pleural space were obtained Note is made of a large right pleural effusion The patient has right back was prepped and draped in a sterile fashion maximum sterile barrier technique. Following the uneventful administration of lidocaine I introduced a 5 [place] Yueh catheter into the pleural space. I then anesthetized a 10 cm tract along the posterior chest wall. Using a blunt tunneling device I tunneled the PleurX catheter to the Yueh catheter. Through the [facility] catheter an Amplatz wire was advanced into the pleural space. Over the Amplatz wire multiple dilators were used to dilate a tract into the right pleural space. The PleurX catheter was then introduced into the peel-away sheath and position within the pleural space. The patient tolerated the procedure well and no complications. A sterile dressing was placed over the entry sites. 1. Successful placement of a PleurX catheter within the right pleural space 2. 1000 cc of fluid was aspirated. IR REMOVAL OF TUNNELED PLEURAL CATH W CUFF    Result Date: 2/23/2023  PROCEDURE: IR REMOVAL TUNNELED PLEURAL CATHETER 2/23/2023 HISTORY: ORDERING SYSTEM PROVIDED HISTORY: PleuRX Catheter removal TECHNOLOGIST PROVIDED HISTORY: Reason for exam:->PleuRX Catheter removal Which side should the procedure be performed?->Radiologist Recommendation TECHNIQUE: Surgical removal of the indwelling PleurX catheter CONTRAST: None SEDATION: None FLUOROSCOPY DOSE AND TYPE: None DESCRIPTION OF PROCEDURE: Informed consent was obtained after a detailed explanation of the procedure including risks, benefits, and alternatives. Universal protocol was observed. Sterile gowns, masks, hats and gloves utilized for maximal sterile barrier.  FINDINGS: The patient's right side was prepped and draped in a sterile fashion maximum sterile barrier technique. Following the uneventful administration of lidocaine I manual remove the indwelling PleurX catheter. A sterile dressing was placed over the insertion site. Successful removal of the indwelling right PleurX catheter. Imaging and labs were reviewed per medical records. Thank you for involving me in the care of Wilton Friend I will continue to follow. Please do not hesitate to call [phone] for any questions or concerns.     Electronically signed by Sylwia Abbasi MD on 3/11/2023 at 8:27 AM

## 2023-03-12 ENCOUNTER — APPOINTMENT (OUTPATIENT)
Dept: GENERAL RADIOLOGY | Age: 73
End: 2023-03-12
Payer: MEDICARE

## 2023-03-12 LAB
ABO/RH: NORMAL
ANION GAP SERPL CALCULATED.3IONS-SCNC: 11 MMOL/L (ref 7–16)
ANISOCYTOSIS: ABNORMAL
ANTIBODY SCREEN: NORMAL
B.E.: 4.2 MMOL/L (ref -3–3)
BASOPHILS ABSOLUTE: 0 E9/L (ref 0–0.2)
BASOPHILS RELATIVE PERCENT: 0 % (ref 0–2)
BLOOD BANK DISPENSE STATUS: NORMAL
BLOOD BANK PRODUCT CODE: NORMAL
BPU ID: NORMAL
BUN BLDV-MCNC: 33 MG/DL (ref 6–23)
CALCIUM SERPL-MCNC: 8.4 MG/DL (ref 8.6–10.2)
CHLORIDE BLD-SCNC: 97 MMOL/L (ref 98–107)
CO2: 29 MMOL/L (ref 22–29)
COHB: 1.1 % (ref 0–1.5)
CREAT SERPL-MCNC: 2.5 MG/DL (ref 0.5–1)
CRITICAL: ABNORMAL
DATE ANALYZED: ABNORMAL
DATE OF COLLECTION: ABNORMAL
DESCRIPTION BLOOD BANK: NORMAL
EOSINOPHILS ABSOLUTE: 0 E9/L (ref 0.05–0.5)
EOSINOPHILS RELATIVE PERCENT: 0 % (ref 0–6)
GFR SERPL CREATININE-BSD FRML MDRD: 20 ML/MIN/1.73
GLUCOSE BLD-MCNC: 129 MG/DL (ref 74–99)
HCO3: 28.8 MMOL/L (ref 22–26)
HCT VFR BLD CALC: 25.7 % (ref 34–48)
HCT VFR BLD CALC: 32.3 % (ref 34–48)
HEMOGLOBIN: 7.2 G/DL (ref 11.5–15.5)
HEMOGLOBIN: 9.2 G/DL (ref 11.5–15.5)
HHB: 2.5 % (ref 0–5)
HYPOCHROMIA: ABNORMAL
LAB: ABNORMAL
LYMPHOCYTES ABSOLUTE: 0.2 E9/L (ref 1.5–4)
LYMPHOCYTES RELATIVE PERCENT: 5.3 % (ref 20–42)
Lab: ABNORMAL
MAGNESIUM: 1.7 MG/DL (ref 1.6–2.6)
MCH RBC QN AUTO: 25.1 PG (ref 26–35)
MCHC RBC AUTO-ENTMCNC: 28 % (ref 32–34.5)
MCV RBC AUTO: 89.5 FL (ref 80–99.9)
METAMYELOCYTES RELATIVE PERCENT: 1.8 % (ref 0–1)
METER GLUCOSE: 160 MG/DL (ref 74–99)
METER GLUCOSE: 164 MG/DL (ref 74–99)
METER GLUCOSE: 179 MG/DL (ref 74–99)
METER GLUCOSE: 209 MG/DL (ref 74–99)
METHB: 0.2 % (ref 0–1.5)
MODE: ABNORMAL
MONOCYTES ABSOLUTE: 0.04 E9/L (ref 0.1–0.95)
MONOCYTES RELATIVE PERCENT: 0.9 % (ref 2–12)
MRSA CULTURE ONLY: NORMAL
NEUTROPHILS ABSOLUTE: 3.76 E9/L (ref 1.8–7.3)
NEUTROPHILS RELATIVE PERCENT: 92 % (ref 43–80)
NUCLEATED RED BLOOD CELLS: 0.9 /100 WBC
O2 CONTENT: 11.4 ML/DL
O2 SATURATION: 97.5 % (ref 92–98.5)
O2HB: 96.2 % (ref 94–97)
OPERATOR ID: ABNORMAL
ORGANISM: ABNORMAL
OVALOCYTES: ABNORMAL
PATIENT TEMP: 37 C
PCO2: 43.5 MMHG (ref 35–45)
PDW BLD-RTO: 19.6 FL (ref 11.5–15)
PEEP/CPAP: 6 CMH2O
PH BLOOD GAS: 7.44 (ref 7.35–7.45)
PHOSPHORUS: 2.9 MG/DL (ref 2.5–4.5)
PIP: 12 CMH2O
PLATELET # BLD: 108 E9/L (ref 130–450)
PMV BLD AUTO: 10.7 FL (ref 7–12)
PO2: 93.4 MMHG (ref 75–100)
POIKILOCYTES: ABNORMAL
POLYCHROMASIA: ABNORMAL
POTASSIUM SERPL-SCNC: 3 MMOL/L (ref 3.5–5)
PRO-BNP: ABNORMAL PG/ML (ref 0–125)
RBC # BLD: 2.87 E12/L (ref 3.5–5.5)
SCHISTOCYTES: ABNORMAL
SODIUM BLD-SCNC: 137 MMOL/L (ref 132–146)
SOURCE, BLOOD GAS: ABNORMAL
TARGET CELLS: ABNORMAL
TEAR DROP CELLS: ABNORMAL
THB: 8.3 G/DL (ref 11.5–16.5)
TIME ANALYZED: 531
TSH SERPL DL<=0.05 MIU/L-ACNC: 26.99 UIU/ML (ref 0.27–4.2)
URINE CULTURE, ROUTINE: ABNORMAL
URINE CULTURE, ROUTINE: ABNORMAL
VACUOLATED NEUTROPHILS: ABNORMAL
WBC # BLD: 4 E9/L (ref 4.5–11.5)

## 2023-03-12 PROCEDURE — 6370000000 HC RX 637 (ALT 250 FOR IP): Performed by: INTERNAL MEDICINE

## 2023-03-12 PROCEDURE — 94660 CPAP INITIATION&MGMT: CPT

## 2023-03-12 PROCEDURE — 36430 TRANSFUSION BLD/BLD COMPNT: CPT

## 2023-03-12 PROCEDURE — 94640 AIRWAY INHALATION TREATMENT: CPT

## 2023-03-12 PROCEDURE — 6360000002 HC RX W HCPCS

## 2023-03-12 PROCEDURE — 99232 SBSQ HOSP IP/OBS MODERATE 35: CPT | Performed by: INTERNAL MEDICINE

## 2023-03-12 PROCEDURE — 6360000002 HC RX W HCPCS: Performed by: SPECIALIST

## 2023-03-12 PROCEDURE — 97161 PT EVAL LOW COMPLEX 20 MIN: CPT | Performed by: PHYSICAL THERAPIST

## 2023-03-12 PROCEDURE — 86850 RBC ANTIBODY SCREEN: CPT

## 2023-03-12 PROCEDURE — 2580000003 HC RX 258: Performed by: INTERNAL MEDICINE

## 2023-03-12 PROCEDURE — P9047 ALBUMIN (HUMAN), 25%, 50ML: HCPCS | Performed by: INTERNAL MEDICINE

## 2023-03-12 PROCEDURE — 83735 ASSAY OF MAGNESIUM: CPT

## 2023-03-12 PROCEDURE — 84100 ASSAY OF PHOSPHORUS: CPT

## 2023-03-12 PROCEDURE — 86900 BLOOD TYPING SEROLOGIC ABO: CPT

## 2023-03-12 PROCEDURE — 36415 COLL VENOUS BLD VENIPUNCTURE: CPT

## 2023-03-12 PROCEDURE — 85018 HEMOGLOBIN: CPT

## 2023-03-12 PROCEDURE — 6360000002 HC RX W HCPCS: Performed by: INTERNAL MEDICINE

## 2023-03-12 PROCEDURE — 2000000000 HC ICU R&B

## 2023-03-12 PROCEDURE — 83880 ASSAY OF NATRIURETIC PEPTIDE: CPT

## 2023-03-12 PROCEDURE — C9113 INJ PANTOPRAZOLE SODIUM, VIA: HCPCS | Performed by: INTERNAL MEDICINE

## 2023-03-12 PROCEDURE — P9016 RBC LEUKOCYTES REDUCED: HCPCS

## 2023-03-12 PROCEDURE — 85014 HEMATOCRIT: CPT

## 2023-03-12 PROCEDURE — 84443 ASSAY THYROID STIM HORMONE: CPT

## 2023-03-12 PROCEDURE — 82962 GLUCOSE BLOOD TEST: CPT

## 2023-03-12 PROCEDURE — 2580000003 HC RX 258

## 2023-03-12 PROCEDURE — 86901 BLOOD TYPING SEROLOGIC RH(D): CPT

## 2023-03-12 PROCEDURE — 6370000000 HC RX 637 (ALT 250 FOR IP)

## 2023-03-12 PROCEDURE — 2500000003 HC RX 250 WO HCPCS

## 2023-03-12 PROCEDURE — 82805 BLOOD GASES W/O2 SATURATION: CPT

## 2023-03-12 PROCEDURE — 97530 THERAPEUTIC ACTIVITIES: CPT | Performed by: PHYSICAL THERAPIST

## 2023-03-12 PROCEDURE — 71045 X-RAY EXAM CHEST 1 VIEW: CPT

## 2023-03-12 PROCEDURE — 86923 COMPATIBILITY TEST ELECTRIC: CPT

## 2023-03-12 PROCEDURE — 80048 BASIC METABOLIC PNL TOTAL CA: CPT

## 2023-03-12 PROCEDURE — 85025 COMPLETE CBC W/AUTO DIFF WBC: CPT

## 2023-03-12 PROCEDURE — 2700000000 HC OXYGEN THERAPY PER DAY

## 2023-03-12 RX ORDER — ROPINIROLE 1 MG/1
1 TABLET, FILM COATED ORAL 3 TIMES DAILY
Status: DISCONTINUED | OUTPATIENT
Start: 2023-03-12 | End: 2023-03-16 | Stop reason: HOSPADM

## 2023-03-12 RX ORDER — 0.9 % SODIUM CHLORIDE 0.9 %
250 INTRAVENOUS SOLUTION INTRAVENOUS ONCE
Status: COMPLETED | OUTPATIENT
Start: 2023-03-12 | End: 2023-03-12

## 2023-03-12 RX ORDER — POTASSIUM CHLORIDE 20 MEQ/1
40 TABLET, EXTENDED RELEASE ORAL ONCE
Status: COMPLETED | OUTPATIENT
Start: 2023-03-12 | End: 2023-03-12

## 2023-03-12 RX ORDER — SODIUM CHLORIDE 9 MG/ML
INJECTION, SOLUTION INTRAVENOUS PRN
Status: DISCONTINUED | OUTPATIENT
Start: 2023-03-12 | End: 2023-03-16 | Stop reason: HOSPADM

## 2023-03-12 RX ORDER — ALBUMIN (HUMAN) 12.5 G/50ML
25 SOLUTION INTRAVENOUS ONCE
Status: COMPLETED | OUTPATIENT
Start: 2023-03-12 | End: 2023-03-12

## 2023-03-12 RX ORDER — CARBIDOPA AND LEVODOPA 25; 100 MG/1; MG/1
2 TABLET, EXTENDED RELEASE ORAL 3 TIMES DAILY
Status: DISCONTINUED | OUTPATIENT
Start: 2023-03-12 | End: 2023-03-16 | Stop reason: HOSPADM

## 2023-03-12 RX ORDER — SODIUM CHLORIDE 9 MG/ML
INJECTION, SOLUTION INTRAVENOUS CONTINUOUS
Status: DISCONTINUED | OUTPATIENT
Start: 2023-03-12 | End: 2023-03-12

## 2023-03-12 RX ADMIN — ROPINIROLE HYDROCHLORIDE 1 MG: 1 TABLET, FILM COATED ORAL at 15:03

## 2023-03-12 RX ADMIN — INSULIN GLARGINE 8 UNITS: 100 INJECTION, SOLUTION SUBCUTANEOUS at 22:10

## 2023-03-12 RX ADMIN — ALBUTEROL SULFATE 2.5 MG: 2.5 SOLUTION RESPIRATORY (INHALATION) at 06:31

## 2023-03-12 RX ADMIN — SODIUM CHLORIDE 250 ML: 9 INJECTION, SOLUTION INTRAVENOUS at 04:43

## 2023-03-12 RX ADMIN — LINEZOLID 600 MG: 600 TABLET, FILM COATED ORAL at 22:00

## 2023-03-12 RX ADMIN — Medication 10 ML: at 22:02

## 2023-03-12 RX ADMIN — METOPROLOL TARTRATE 25 MG: 25 TABLET, FILM COATED ORAL at 22:01

## 2023-03-12 RX ADMIN — SERTRALINE 50 MG: 50 TABLET, FILM COATED ORAL at 22:02

## 2023-03-12 RX ADMIN — ROPINIROLE HYDROCHLORIDE 1 MG: 1 TABLET, FILM COATED ORAL at 22:01

## 2023-03-12 RX ADMIN — POTASSIUM CHLORIDE 40 MEQ: 1500 TABLET, EXTENDED RELEASE ORAL at 10:27

## 2023-03-12 RX ADMIN — ALBUTEROL SULFATE 2.5 MG: 2.5 SOLUTION RESPIRATORY (INHALATION) at 14:43

## 2023-03-12 RX ADMIN — IPRATROPIUM BROMIDE 0.5 MG: 0.5 SOLUTION RESPIRATORY (INHALATION) at 18:14

## 2023-03-12 RX ADMIN — LEVOTHYROXINE SODIUM ANHYDROUS 100 MCG: 100 INJECTION, POWDER, LYOPHILIZED, FOR SOLUTION INTRAVENOUS at 06:07

## 2023-03-12 RX ADMIN — Medication 40 MG: at 08:39

## 2023-03-12 RX ADMIN — ONDANSETRON 4 MG: 2 INJECTION INTRAMUSCULAR; INTRAVENOUS at 11:02

## 2023-03-12 RX ADMIN — ACETAMINOPHEN 650 MG: 325 TABLET ORAL at 15:32

## 2023-03-12 RX ADMIN — CEFEPIME 1000 MG: 1 INJECTION, POWDER, FOR SOLUTION INTRAMUSCULAR; INTRAVENOUS at 15:02

## 2023-03-12 RX ADMIN — LINEZOLID 600 MG: 600 TABLET, FILM COATED ORAL at 08:39

## 2023-03-12 RX ADMIN — IPRATROPIUM BROMIDE 0.5 MG: 0.5 SOLUTION RESPIRATORY (INHALATION) at 14:43

## 2023-03-12 RX ADMIN — HYDROCORTISONE SODIUM SUCCINATE 100 MG: 100 INJECTION, POWDER, FOR SOLUTION INTRAMUSCULAR; INTRAVENOUS at 09:49

## 2023-03-12 RX ADMIN — IPRATROPIUM BROMIDE 0.5 MG: 0.5 SOLUTION RESPIRATORY (INHALATION) at 09:35

## 2023-03-12 RX ADMIN — QUETIAPINE FUMARATE 25 MG: 25 TABLET ORAL at 22:01

## 2023-03-12 RX ADMIN — SODIUM CHLORIDE 250 ML: 9 INJECTION, SOLUTION INTRAVENOUS at 04:19

## 2023-03-12 RX ADMIN — HYDROCORTISONE SODIUM SUCCINATE 100 MG: 100 INJECTION, POWDER, FOR SOLUTION INTRAMUSCULAR; INTRAVENOUS at 04:29

## 2023-03-12 RX ADMIN — IPRATROPIUM BROMIDE 0.5 MG: 0.5 SOLUTION RESPIRATORY (INHALATION) at 06:31

## 2023-03-12 RX ADMIN — CARBIDOPA AND LEVODOPA 2 TABLET: 25; 100 TABLET, EXTENDED RELEASE ORAL at 15:03

## 2023-03-12 RX ADMIN — Medication 10 ML: at 08:38

## 2023-03-12 RX ADMIN — INSULIN GLARGINE 8 UNITS: 100 INJECTION, SOLUTION SUBCUTANEOUS at 08:41

## 2023-03-12 RX ADMIN — SODIUM CHLORIDE: 9 INJECTION, SOLUTION INTRAVENOUS at 05:28

## 2023-03-12 RX ADMIN — BUDESONIDE 500 MCG: 0.5 SUSPENSION RESPIRATORY (INHALATION) at 06:31

## 2023-03-12 RX ADMIN — CEFEPIME 1000 MG: 1 INJECTION, POWDER, FOR SOLUTION INTRAMUSCULAR; INTRAVENOUS at 04:22

## 2023-03-12 RX ADMIN — ALBUMIN (HUMAN) 25 G: 0.25 INJECTION, SOLUTION INTRAVENOUS at 12:31

## 2023-03-12 RX ADMIN — BUDESONIDE 500 MCG: 0.5 SUSPENSION RESPIRATORY (INHALATION) at 18:14

## 2023-03-12 RX ADMIN — ACETAMINOPHEN 650 MG: 325 TABLET ORAL at 08:39

## 2023-03-12 RX ADMIN — PHENYLEPHRINE HYDROCHLORIDE 30 MCG/MIN: 50 INJECTION INTRAVENOUS at 06:01

## 2023-03-12 RX ADMIN — CARBIDOPA AND LEVODOPA 2 TABLET: 25; 100 TABLET, EXTENDED RELEASE ORAL at 22:27

## 2023-03-12 RX ADMIN — ALBUTEROL SULFATE 2.5 MG: 2.5 SOLUTION RESPIRATORY (INHALATION) at 09:35

## 2023-03-12 RX ADMIN — FLUCONAZOLE IN SODIUM CHLORIDE 200 MG: 2 INJECTION, SOLUTION INTRAVENOUS at 08:37

## 2023-03-12 RX ADMIN — HYDROCORTISONE SODIUM SUCCINATE 50 MG: 100 INJECTION, POWDER, FOR SOLUTION INTRAMUSCULAR; INTRAVENOUS at 18:51

## 2023-03-12 RX ADMIN — ALBUTEROL SULFATE 2.5 MG: 2.5 SOLUTION RESPIRATORY (INHALATION) at 18:14

## 2023-03-12 ASSESSMENT — PAIN SCALES - GENERAL
PAINLEVEL_OUTOF10: 0
PAINLEVEL_OUTOF10: 2
PAINLEVEL_OUTOF10: 0
PAINLEVEL_OUTOF10: 2
PAINLEVEL_OUTOF10: 2

## 2023-03-12 NOTE — PLAN OF CARE
Problem: Pain  Goal: Verbalizes/displays adequate comfort level or baseline comfort level  Outcome: Progressing  Flowsheets (Taken 3/11/2023 1600)  Verbalizes/displays adequate comfort level or baseline comfort level: Encourage patient to monitor pain and request assistance     Problem: Safety - Medical Restraint  Goal: Remains free of injury from restraints (Restraint for Interference with Medical Device)  Description: INTERVENTIONS:  1. Determine that other, less restrictive measures have been tried or would not be effective before applying the restraint  2. Evaluate the patient's condition at the time of restraint application  3. Inform patient/family regarding the reason for restraint  4.  Q2H: Monitor safety, psychosocial status, comfort, nutrition and hydration  Outcome: Completed  Flowsheets  Taken 3/11/2023 1200 by Rafaela Case RN  Remains free of injury from restraints (restraint for interference with medical device): Every 2 hours: Monitor safety, psychosocial status, comfort, nutrition and hydration  Taken 3/11/2023 1056 by Rafaela Case RN  Remains free of injury from restraints (restraint for interference with medical device): Every 2 hours: Monitor safety, psychosocial status, comfort, nutrition and hydration  Taken 3/11/2023 1000 by Rafaela Case RN  Remains free of injury from restraints (restraint for interference with medical device): Every 2 hours: Monitor safety, psychosocial status, comfort, nutrition and hydration  Taken 3/11/2023 0800 by Rafaela Case RN  Remains free of injury from restraints (restraint for interference with medical device): Every 2 hours: Monitor safety, psychosocial status, comfort, nutrition and hydration     Problem: Chronic Conditions and Co-morbidities  Goal: Patient's chronic conditions and co-morbidity symptoms are monitored and maintained or improved  Outcome: Progressing     Problem: Safety - Adult  Goal: Free from fall injury  Outcome: Progressing     Problem: Skin/Tissue Integrity  Goal: Absence of new skin breakdown  Description: 1. Monitor for areas of redness and/or skin breakdown  2. Assess vascular access sites hourly  3. Every 4-6 hours minimum:  Change oxygen saturation probe site  4. Every 4-6 hours:  If on nasal continuous positive airway pressure, respiratory therapy assess nares and determine need for appliance change or resting period.   Outcome: Progressing     Problem: Nutrition Deficit:  Goal: Optimize nutritional status  3/11/2023 0954 by Eleanor Metzger RD  Outcome: Progressing     Problem: ABCDS Injury Assessment  Goal: Absence of physical injury  Outcome: Progressing     Problem: Cardiovascular - Adult  Goal: Maintains optimal cardiac output and hemodynamic stability  Outcome: Completed  Goal: Absence of cardiac dysrhythmias or at baseline  Outcome: Completed     Problem: Skin/Tissue Integrity - Adult  Goal: Skin integrity remains intact  Outcome: Progressing  Flowsheets (Taken 3/11/2023 0800 by Addis Palacio RN)  Skin Integrity Remains Intact: Monitor for areas of redness and/or skin breakdown     Problem: Genitourinary - Adult  Goal: Urinary catheter remains patent  Outcome: Progressing     Problem: Infection - Adult  Goal: Absence of infection during hospitalization  Outcome: Progressing

## 2023-03-12 NOTE — PROGRESS NOTES
Department of Internal Medicine  General Internal Medicine  Attending Progress Note  Chief Complaint   Patient presents with    Hypotension     SUBJECTIVE:    Reports that she is doing well. Denied fever and chills. No chest pain. No nausea or vomiting.     OBJECTIVE      Medications    Current Facility-Administered Medications: 0.9 % sodium chloride infusion, , IntraVENous, PRN  phenylephrine (JIMENA-SYNEPHRINE) 50 mg in sodium chloride 0.9 % 250 mL infusion,  mcg/min, IntraVENous, Continuous  [START ON 3/13/2023] epoetin josé luis-epbx (RETACRIT) injection 3,000 Units, 3,000 Units, SubCUTAneous, Once per day on Mon Wed Fri  hydrocortisone sodium succinate PF (SOLU-CORTEF) injection 50 mg, 50 mg, IntraVENous, Q8H  sertraline (ZOLOFT) tablet 50 mg, 50 mg, Oral, Nightly  rOPINIRole (REQUIP) tablet 1 mg, 1 mg, Oral, TID  carbidopa-levodopa (SINEMET CR)  MG per extended release tablet 2 tablet, 2 tablet, Oral, TID  metoprolol tartrate (LOPRESSOR) tablet 25 mg, 25 mg, Oral, BID  fluconazole (DIFLUCAN) in 0.9 % sodium chloride IVPB 200 mg, 200 mg, IntraVENous, Q24H  pantoprazole (PROTONIX) 40 mg in sodium chloride (PF) 0.9 % 10 mL injection, 40 mg, IntraVENous, Daily  glucose chewable tablet 16 g, 4 tablet, Oral, PRN  dextrose bolus 10% 125 mL, 125 mL, IntraVENous, PRN **OR** dextrose bolus 10% 250 mL, 250 mL, IntraVENous, PRN  glucagon (rDNA) injection 1 mg, 1 mg, SubCUTAneous, PRN  dextrose 10 % infusion, , IntraVENous, Continuous PRN  apixaban (ELIQUIS) tablet 5 mg, 5 mg, Oral, BID  insulin lispro (HUMALOG) injection vial 0-4 Units, 0-4 Units, SubCUTAneous, Nightly  insulin glargine (LANTUS) injection vial 8 Units, 8 Units, SubCUTAneous, BID  insulin lispro (HUMALOG) injection vial 0-8 Units, 0-8 Units, SubCUTAneous, TID WC  QUEtiapine (SEROQUEL) tablet 25 mg, 25 mg, Oral, Nightly  levothyroxine (SYNTHROID) injection 100 mcg, 100 mcg, IntraVENous, Daily  sodium chloride flush 0.9 % injection 10 mL, 10 mL, IntraVENous, 2 times per day  sodium chloride flush 0.9 % injection 10 mL, 10 mL, IntraVENous, PRN  0.9 % sodium chloride infusion, , IntraVENous, PRN  promethazine (PHENERGAN) tablet 12.5 mg, 12.5 mg, Oral, Q6H PRN **OR** ondansetron (ZOFRAN) injection 4 mg, 4 mg, IntraVENous, Q6H PRN  polyethylene glycol (GLYCOLAX) packet 17 g, 17 g, Oral, Daily PRN  acetaminophen (TYLENOL) tablet 650 mg, 650 mg, Oral, Q6H PRN **OR** acetaminophen (TYLENOL) suppository 650 mg, 650 mg, Rectal, Q6H PRN  DOPamine (INTROPIN) 800 mg in dextrose 5 % 250 mL infusion, 1-20 mcg/kg/min, IntraVENous, Continuous  budesonide (PULMICORT) nebulizer suspension 500 mcg, 0.5 mg, Nebulization, BID  albuterol (PROVENTIL) nebulizer solution 2.5 mg, 2.5 mg, Nebulization, Q4H WA **AND** ipratropium (ATROVENT) 0.02 % nebulizer solution 0.5 mg, 0.5 mg, Nebulization, Q4H WA  cefepime (MAXIPIME) 1,000 mg in sodium chloride 0.9 % 50 mL IVPB ([id number]), 1,000 mg, IntraVENous, Q12H  linezolid (ZYVOX) tablet 600 mg, 600 mg, Oral, 2 times per day  [START ON 3/13/2023] midodrine (PROAMATINE) tablet 10 mg, 10 mg, Per NG tube, Q MWF  Physical    VITALS:  /86   Pulse (!) 109   Temp 99.1 °F (37.3 °C) (Bladder)   Resp 17   Ht 5' (1.524 m)   Wt 211 lb 4.8 oz (95.8 kg)   SpO2 97%   BMI 41.27 kg/m²   CONSTITUTIONAL:  awake, cooperative, and no apparent distress  EYES:  extra-ocular muscles intact  ENT:  normocepalic, without obvious abnormality  NECK:  supple, symmetrical, trachea midline  LUNGS:  no increased work of breathing, no retractions, and clear to auscultation via anterior auscultation  CARDIOVASCULAR:  normal apical pulses and normal S1 and S2  ABDOMEN:  normal bowel sounds, non-distended, and non-tender  MUSCULOSKELETAL:  full range of motion noted  NEUROLOGIC:  Mental Status Exam:  Level of Alertness:   awake  Orientation:   person, place, time  SKIN:  no bruising or bleeding  Data    CBC:   Lab Results   Component Value Date/Time    WBC 4.0 03/12/2023 04:34 AM    RBC 2.87 03/12/2023 04:34 AM    HGB 9.2 03/12/2023 11:49 AM    HCT 32.3 03/12/2023 11:49 AM    MCV 89.5 03/12/2023 04:34 AM    MCH 25.1 03/12/2023 04:34 AM    MCHC 28.0 03/12/2023 04:34 AM    RDW 19.6 03/12/2023 04:34 AM     03/12/2023 04:34 AM    MPV 10.7 03/12/2023 04:34 AM     BMP:    Lab Results   Component Value Date/Time     03/12/2023 04:34 AM    K 3.0 03/12/2023 04:34 AM    K 3.6 02/20/2023 01:42 PM    CL 97 03/12/2023 04:34 AM    CO2 29 03/12/2023 04:34 AM    BUN 33 03/12/2023 04:34 AM    LABALBU 4.1 03/11/2023 04:21 AM    CREATININE 2.5 03/12/2023 04:34 AM    CALCIUM 8.4 03/12/2023 04:34 AM    GFRAA >60 10/16/2022 09:20 AM    LABGLOM 20 03/12/2023 04:34 AM    GLUCOSE 129 03/12/2023 04:34 AM       ASSESSMENT AND PLAN    Brief Summary of stay:  Patient was admitted with altered mentation. 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 She was intubated, started on pressors and Solu-Cortef. She was extubated on 3/11. Gradual clinical improvement has been noted since admission. Patient is anemic and no source of bleeding has been found yet. 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 Continue to monitor  Acute on chronic Anemia: CKD related. Transfused. ESRD on HD: per Nephro  Atrial Fibrillation: poorly controlled this morning. Resume home medications  Thrombocytopenia: related to possible heparin used. Added as allergy and started on eliquis  DM type 2: insulin as ordered  UTI: positive for Enterococcus Cloacae: abx per ID  Acute on chronic respiratory failure with hypercapnia: continue supplemental oxygen and bipap as recommended  Pleural Effusion: overall stable. Thoracentesis was last admission. Congestive heart failure: stable.  Continue current care

## 2023-03-12 NOTE — PLAN OF CARE
Problem: Pain  Goal: Verbalizes/displays adequate comfort level or baseline comfort level  3/12/2023 0452 by Ash Devine RN  Outcome: Progressing     Problem: Skin/Tissue Integrity  Goal: Absence of new skin breakdown  Description: 1. Monitor for areas of redness and/or skin breakdown  2. Assess vascular access sites hourly  3. Every 4-6 hours minimum:  Change oxygen saturation probe site  4. Every 4-6 hours:  If on nasal continuous positive airway pressure, respiratory therapy assess nares and determine need for appliance change or resting period. 3/12/2023 0452 by Ash Devine RN  Outcome: Progressing     Problem: Genitourinary - Adult  Goal: Urinary catheter remains patent  3/12/2023 0452 by Ash Devine RN  Outcome: Progressing     Problem: Anxiety  Goal: Will report anxiety at manageable levels  Description: INTERVENTIONS:  1. Administer medication as ordered  2. Teach and rehearse alternative coping skills  3.  Provide emotional support with 1:1 interaction with staff  Outcome: Progressing

## 2023-03-12 NOTE — PROGRESS NOTES
[facility] MD Ana Feliz MD Eli Client, MD Earlyne Candle, BRADLEY Devlin, NUNU Sesay, BRADLEY  Progress Note    3/12/2023    SUBJECTIVE:   3/11: Seen in the ICU. Mechanically ventilated and sedated. FiO2 35 % PEEP 5. She opens eyes to voice, does not follow commands. Tolerated HD yesterday without complications. TSH improving. On dopamine infusion. 3/12: Seen in the ICU. Extubated yesterday, now on nasal canula. No acute complaints. Confused. Denies chest pain, dyspnea, or palpitations. BP low this morning with elevated heart rate. Hgb decreased from 10.2-->7.2. receiving 1 unit PRBC. Denies hematochezia. On jimena. PROBLEM LIST:    Principal Problem:    Myxedema coma ([address]  Resolved Problems:    * No resolved hospital problems. *         DIET:    ADULT DIET; Dysphagia - Soft and Bite Sized; 4 carb choices (60 gm/meal);  No Drinking Straws     MEDS (scheduled):    potassium chloride  40 mEq Oral Once    fluconazole  200 mg IntraVENous Q24H    pantoprazole (PROTONIX) 40 mg injection  40 mg IntraVENous Daily    apixaban  5 mg Oral BID    insulin lispro  0-4 Units SubCUTAneous Nightly    insulin glargine  8 Units SubCUTAneous BID    insulin lispro  0-8 Units SubCUTAneous TID WC    QUEtiapine  25 mg Oral Nightly    hydrocortisone sodium succinate PF  100 mg IntraVENous Q8H    levothyroxine  100 mcg IntraVENous Daily    sodium chloride flush  10 mL IntraVENous 2 times per day    budesonide  0.5 mg Nebulization BID    albuterol  2.5 mg Nebulization Q4H WA    And    ipratropium  0.5 mg Nebulization Q4H WA    cefepime  1,000 mg IntraVENous Q12H    linezolid  600 mg Oral 2 times per day    [START ON 3/13/2023] midodrine  10 mg Per NG tube Q MWF       MEDS (infusions):   sodium chloride      phenylephrine (JIMENA-SYNEPHRINE) 50 mg/250 mL infusion 30 mcg/min (03/12/23 3980)    dextrose      sodium chloride Stopped (03/11/23 4802)    DOPamine Stopped (03/11/23 4212) MEDS (prn):  sodium chloride, glucose, dextrose bolus **OR** dextrose bolus, glucagon (rDNA), dextrose, sodium chloride flush, sodium chloride, promethazine **OR** ondansetron, polyethylene glycol, acetaminophen **OR** acetaminophen    PHYSICAL EXAM:     Patient Vitals for the past 24 hrs:   BP Temp Temp src Pulse Resp SpO2   03/12/23 0940 123/65 99.3 °F (37.4 °C) Bladder (!) 134 15 100 %   03/12/23 0921 (!) 97/58 99.1 °F (37.3 °C) Bladder (!) 144 16 100 %   03/12/23 0700 106/72 -- -- (!) 111 14 100 %   03/12/23 0600 (!) 69/42 -- -- (!) 112 18 100 %   03/12/23 0500 (!) 66/41 -- -- (!) 126 18 100 %   03/12/23 0400 -- 98.8 °F (37.1 °C) Bladder -- -- --   03/12/23 0300 (!) 84/39 -- -- 75 22 99 %   03/12/23 0100 (!) 81/41 -- -- 76 18 100 %   03/12/23 0000 (!) 99/44 98.8 °F (37.1 °C) Bladder 74 14 100 %   03/11/23 2300 (!) 96/51 -- -- 79 19 100 %   03/11/23 2234 -- -- -- -- 24 --   03/11/23 2200 (!) 90/45 -- -- 75 13 100 %   03/11/23 2100 (!) 91/51 -- -- 76 14 100 %   03/11/23 2000 (!) 104/46 99.8 °F (37.7 °C) CORE 77 16 100 %   03/11/23 1900 (!) 82/41 -- -- 86 20 100 %   03/11/23 1800 (!) 93/45 -- -- 95 16 100 %   03/11/23 1700 119/65 -- -- 82 25 100 %   03/11/23 1600 -- 98.1 °F (36.7 °C) Oral -- -- --   03/11/23 1445 -- -- -- 82 17 100 %   03/11/23 1430 127/71 -- -- 85 10 100 %   03/11/23 1415 127/68 -- -- 84 12 100 %   03/11/23 1400 122/66 -- -- 84 (!) 9 100 %   03/11/23 1345 112/72 -- -- 83 (!) 9 100 %   03/11/23 1330 122/64 -- -- 84 (!) 8 100 %   03/11/23 1315 114/70 -- -- 87 (!) 9 100 %   03/11/23 1300 135/76 -- -- 92 19 100 %   03/11/23 1245 136/85 -- -- 97 14 100 %   03/11/23 1230 131/84 -- -- 94 20 100 %   03/11/23 1215 (!) 140/76 -- -- 95 20 100 %   03/11/23 1200 (!) 144/89 99.1 °F (37.3 °C) Bladder 89 16 100 %   03/11/23 1130 (!) 144/84 -- -- 93 14 99 %   03/11/23 1100 (!) 145/83 -- -- 87 17 100 %   03/11/23 1030 (!) 134/92 -- -- (!) 157 21 97 %     @      Intake/Output Summary (Last 24 hours) at 3/12/2023 1014  Last data filed at 3/12/2023 0139  Gross per 24 hour   Intake 1157.89 ml   Output 925 ml   Net 232.89 ml           Wt Readings from Last 3 Encounters:   03/11/23 211 lb 4.8 oz (95.8 kg)   02/24/23 214 lb 15.2 oz (97.5 kg)   01/16/23 259 lb (117.5 kg)       Constitutional: in no acute distress, confused   HEENT: NC/AT, EOMI, sclera and conjunctiva are clear and anicteric, mucus membranes moist.  Neck: Trachea midline, no JVD. RIJ tesio  Cardiovascular: S1, S2 regular rhythm, no murmur,or rub  Respiratory:  CTAB. No crackles, no wheeze  Gastrointestinal:  Soft, nontender, distended, abdomen and abdominal panus swelling has improved (+1) NABS  Ext: trace dependent edema with skin wrinkling, feet warm  Skin: dry, no rash  Neuro: slight confusion       DATA:    Recent Labs     03/10/23  0440 03/11/23  0421 03/12/23  0434   WBC 11.4 9.9 4.0*   HGB 10.1* 10.2* 7.2*   HCT 40.1 35.9 25.7*   MCV 97.3 87.8 89.5    189 108*       Recent Labs     03/10/23  0001 03/10/23  0440 03/11/23  0421 03/12/23  0434    138 135 137   K 4.0 4.2 3.7 3.0*   CL 99 98 93* 97*   CO2 31* 32* 27 29   MG 2.0 2.0 1.9 1.7   PHOS  --  2.8 2.0* 2.9   BUN 16 17 18 33*   CREATININE 2.9* 2.8* 1.9* 2.5*   ALT <5  --  <5  --    AST 6  --  7  --    BILITOT 0.5  --  1.0  --    ALKPHOS 81  --  94  --          Lab Results   Component Value Date    LABPROT 0.5 (H) 01/18/2023    LABPROT 0.5 01/18/2023          Assessment  Acute on chronic kidney disease requiring initiation of hemodialysis during a recent hospitalization. Hemodialysis on Mondays, Wednesdays, and Fridays. Chronic kidney disease stage III, baseline creatinine 1.4-1.7 mg/dL secondary to diabetic nephropathy and renal microvascular atherosclerotic disease.    Anemia due to CKD  Myxedema Coma   Acute hypercapnic respiratory failure      Urine output-925 cc  Follow closely for signs of recovery, making good UOP  K 3.0  Hgb 7.2     Plan  Continue IHD support for solute and volume clearance on MWF  Ultrafiltration as warranted (no need for UF today)  40 mEq KCL once   Iron panel   Start Retacrit   10 mg midodrine on dialysis days, please give prior to dialysis   Fu blood cutlures   Follow labs  Monitor I&O  Continue intensive supportive care        Electronically signed by RASHARD Fuentes CNP on 3/12/2023 at 10:14 AM

## 2023-03-12 NOTE — PROGRESS NOTES
Pulmonary/Critical Care Progress Note    We are following patient for hypothyroidism causing myxedema coma, acute superimposed on chronic hypercapnic and hypoxemic respiratory failure, diastolic dysfunction, recurrent bilateral pleural effusions right greater than left with recent right thoracentesis for 1000 cc of transudative fluid, morbid obesity, alveolar hypoventilation, obstructive sleep apnea    SUBJECTIVE:  Patient is growing gram-negative rods in her sputum. She is currently covered on this front by cefepime. She is also on linezolid the ID service. She says she is feeling quite well after having been extubated yesterday. The patient is also been on IV hydrocortisone 100 mg every 8 hours likely because of the her need for Synthroid. However, this can be reduced by 50% now and more tomorrow with oral steroids to be started. Today's chest x-ray shows some mild subpulmonic effusion but I do not see any rapid accumulation. This is probably because of her improved thyroid status.     MEDICATIONS:   [START ON 3/13/2023] epoetin josé luis-epbx  3,000 Units SubCUTAneous Once per day on Mon Wed Fri    hydrocortisone sodium succinate PF  50 mg IntraVENous Q8H    fluconazole  200 mg IntraVENous Q24H    pantoprazole (PROTONIX) 40 mg injection  40 mg IntraVENous Daily    apixaban  5 mg Oral BID    insulin lispro  0-4 Units SubCUTAneous Nightly    insulin glargine  8 Units SubCUTAneous BID    insulin lispro  0-8 Units SubCUTAneous TID WC    QUEtiapine  25 mg Oral Nightly    levothyroxine  100 mcg IntraVENous Daily    sodium chloride flush  10 mL IntraVENous 2 times per day    budesonide  0.5 mg Nebulization BID    albuterol  2.5 mg Nebulization Q4H WA    And    ipratropium  0.5 mg Nebulization Q4H WA    cefepime  1,000 mg IntraVENous Q12H    linezolid  600 mg Oral 2 times per day    [START ON 3/13/2023] midodrine  10 mg Per NG tube Q MWF      sodium chloride      phenylephrine (JIMENA-SYNEPHRINE) 50 mg/250 mL infusion 30 mcg/min (03/12/23 0688)    dextrose      sodium chloride Stopped (03/11/23 0921)    DOPamine Stopped (03/11/23 1533)     sodium chloride, glucose, dextrose bolus **OR** dextrose bolus, glucagon (rDNA), dextrose, sodium chloride flush, sodium chloride, promethazine **OR** ondansetron, polyethylene glycol, acetaminophen **OR** acetaminophen      REVIEW OF SYSTEMS:  Constitutional: Denies fever, weight loss, night sweats, and fatigue  Skin: Denies pigmentation, dark lesions, and rashes   HEENT: Denies hearing loss, tinnitus, ear drainage, epistaxis, sore throat, and hoarseness. Cardiovascular: Denies palpitations, chest pain, and chest pressure. Respiratory: Denies cough, dyspnea at rest, hemoptysis, apnea, and choking. Gastrointestinal: Denies nausea, vomiting, poor appetite, diarrhea, heartburn or reflux  Genitourinary: Denies dysuria, frequency, urgency or hematuria  Musculoskeletal: Denies myalgias, muscle weakness, and bone pain  Neurological: Denies dizziness, vertigo, headache, and focal weakness  Psychological: Denies anxiety and depression  Endocrine: Denies heat intolerance and cold intolerance  Hematopoietic/Lymphatic: Denies bleeding problems and blood transfusions    OBJECTIVE:  Vitals:    03/12/23 1400   BP: 96/60   Pulse: (!) 119   Resp: 16   Temp:    SpO2: 97%     FiO2 : 30 %  O2 Flow Rate (L/min): 2 L/min  O2 Device: Nasal cannula    PHYSICAL EXAM:  Constitutional: No fever, chills, diaphoresis  Skin: Skin rash, no skin breakdown  HEENT: Mucous membranes are moist  Neck: JVD, lymphadenopathy, thyromegaly  Cardiovascular: S1, S2 is regular. No S3 or rubs present  Respiratory: Clear to auscultation bilaterally with slight decreased breath sounds at right base  Gastrointestinal: Soft, obese, nontender  Genitourinary: No CVA tenderness  Extremities: No significant edema. Chronic venous stasis changes noted  Neurological: Awake, alert, oriented x3.   No evidence of focal motor or sensory deficits  Psychological: Good spirits. Appropriate affect.     LABS:  WBC   Date Value Ref Range Status   03/12/2023 4.0 (L) 4.5 - 11.5 E9/L Final   03/11/2023 9.9 4.5 - 11.5 E9/L Final   03/10/2023 11.4 4.5 - 11.5 E9/L Final     Hemoglobin   Date Value Ref Range Status   03/12/2023 9.2 (L) 11.5 - 15.5 g/dL Final   03/12/2023 7.2 (L) 11.5 - 15.5 g/dL Final   03/11/2023 10.2 (L) 11.5 - 15.5 g/dL Final     Hematocrit   Date Value Ref Range Status   03/12/2023 32.3 (L) 34.0 - 48.0 % Final   03/12/2023 25.7 (L) 34.0 - 48.0 % Final   03/11/2023 35.9 34.0 - 48.0 % Final     MCV   Date Value Ref Range Status   03/12/2023 89.5 80.0 - 99.9 fL Final   03/11/2023 87.8 80.0 - 99.9 fL Final   03/10/2023 97.3 80.0 - 99.9 fL Final     Platelets   Date Value Ref Range Status   03/12/2023 108 (L) 130 - 450 E9/L Final   03/11/2023 189 130 - 450 E9/L Final   03/10/2023 247 130 - 450 E9/L Final     Sodium   Date Value Ref Range Status   03/12/2023 137 132 - 146 mmol/L Final   03/11/2023 135 132 - 146 mmol/L Final   03/10/2023 138 132 - 146 mmol/L Final     Potassium   Date Value Ref Range Status   03/12/2023 3.0 (L) 3.5 - 5.0 mmol/L Final   03/11/2023 3.7 3.5 - 5.0 mmol/L Final   03/10/2023 4.2 3.5 - 5.0 mmol/L Final     Potassium reflex Magnesium   Date Value Ref Range Status   02/20/2023 3.6 3.5 - 5.0 mmol/L Final   02/19/2023 4.0 3.5 - 5.0 mmol/L Final   02/18/2023 3.7 3.5 - 5.0 mmol/L Final     Chloride   Date Value Ref Range Status   03/12/2023 97 (L) 98 - 107 mmol/L Final   03/11/2023 93 (L) 98 - 107 mmol/L Final   03/10/2023 98 98 - 107 mmol/L Final     CO2   Date Value Ref Range Status   03/12/2023 29 22 - 29 mmol/L Final   03/11/2023 27 22 - 29 mmol/L Final   03/10/2023 32 (H) 22 - 29 mmol/L Final     BUN   Date Value Ref Range Status   03/12/2023 33 (H) 6 - 23 mg/dL Final   03/11/2023 18 6 - 23 mg/dL Final   03/10/2023 17 6 - 23 mg/dL Final     Creatinine   Date Value Ref Range Status   03/12/2023 2.5 (H) 0.5 - 1.0 mg/dL Final   03/11/2023 1.9 (H) 0.5 - 1.0 mg/dL Final   03/10/2023 2.8 (H) 0.5 - 1.0 mg/dL Final     Glucose   Date Value Ref Range Status   03/12/2023 129 (H) 74 - 99 mg/dL Final   03/11/2023 211 (H) 74 - 99 mg/dL Final   03/10/2023 141 (H) 74 - 99 mg/dL Final     Calcium   Date Value Ref Range Status   03/12/2023 8.4 (L) 8.6 - 10.2 mg/dL Final   03/11/2023 9.0 8.6 - 10.2 mg/dL Final   03/10/2023 9.3 8.6 - 10.2 mg/dL Final     Total Protein   Date Value Ref Range Status   03/11/2023 6.5 6.4 - 8.3 g/dL Final   03/11/2023 6.4 6.4 - 8.3 g/dL Final   03/10/2023 6.0 (L) 6.4 - 8.3 g/dL Final     Albumin   Date Value Ref Range Status   03/11/2023 4.1 3.5 - 5.2 g/dL Final   03/10/2023 3.8 3.5 - 5.2 g/dL Final   02/20/2023 4.6 3.5 - 5.2 g/dL Final     Total Bilirubin   Date Value Ref Range Status   03/11/2023 1.0 0.0 - 1.2 mg/dL Final   03/10/2023 0.5 0.0 - 1.2 mg/dL Final   02/20/2023 0.7 0.0 - 1.2 mg/dL Final     Alkaline Phosphatase   Date Value Ref Range Status   03/11/2023 94 35 - 104 U/L Final   03/10/2023 81 35 - 104 U/L Final   02/20/2023 53 35 - 104 U/L Final     AST   Date Value Ref Range Status   03/11/2023 7 0 - 31 U/L Final   03/10/2023 6 0 - 31 U/L Final   02/20/2023 7 0 - 31 U/L Final     ALT   Date Value Ref Range Status   03/11/2023 <5 0 - 32 U/L Final   03/10/2023 <5 0 - 32 U/L Final   02/20/2023 <5 0 - 32 U/L Final     Est, Glom Filt Rate   Date Value Ref Range Status   03/12/2023 20 >=60 mL/min/1.73 Final     Comment:     Pediatric calculator link  [email or web address]/gfr_calculatorped  Effective Oct 3, 2022  These results are not intended for use in patients  <25years of age. eGFR results are calculated without  a race factor using the 2021 CKD-EPI equation. Careful  clinical correlation is recommended, particularly when  comparing to results calculated using previous equations.   The CKD-EPI equation is less accurate in patients with  extremes of muscle mass, extra-renal metabolism of  creatinine, excessive creatinine ingestion, or following  therapy that affects renal tubular secretion. 03/11/2023 27 >=60 mL/min/1.73 Final     Comment:     Pediatric calculator link  [employer].at. [email or web address]/gfr_calculatorped  Effective Oct 3, 2022  These results are not intended for use in patients  <25years of age. eGFR results are calculated without  a race factor using the 2021 CKD-EPI equation. Careful  clinical correlation is recommended, particularly when  comparing to results calculated using previous equations. The CKD-EPI equation is less accurate in patients with  extremes of muscle mass, extra-renal metabolism of  creatinine, excessive creatinine ingestion, or following  therapy that affects renal tubular secretion. 03/10/2023 17 >=60 mL/min/1.73 Final     Comment:     Pediatric calculator link  [employer].at. org/professionals/[email or web address]_[email or web address]  Effective Oct 3, 2022  These results are not intended for use in patients  <25years of age. eGFR results are calculated without  a race factor using the 2021 CKD-EPI equation. Careful  clinical correlation is recommended, particularly when  comparing to results calculated using previous equations. The CKD-EPI equation is less accurate in patients with  extremes of muscle mass, extra-renal metabolism of  creatinine, excessive creatinine ingestion, or following  therapy that affects renal tubular secretion.        GFR    Date Value Ref Range Status   10/16/2022 >60  Final   10/14/2022 59  Final   10/13/2022 >60  Final     Magnesium   Date Value Ref Range Status   03/12/2023 1.7 1.6 - 2.6 mg/dL Final   03/11/2023 1.9 1.6 - 2.6 mg/dL Final   03/10/2023 2.0 1.6 - 2.6 mg/dL Final     Phosphorus   Date Value Ref Range Status   03/12/2023 2.9 2.5 - 4.5 mg/dL Final   03/11/2023 2.0 (L) 2.5 - 4.5 mg/dL Final   03/10/2023 2.8 2.5 - 4.5 mg/dL Final     Recent Labs     03/12/23  0531   PH 7.439   PO2 93.4 PCO2 43.5   HCO3 28.8*   BE 4.2*   O2SAT 97.5       RADIOLOGY:  XR CHEST PORTABLE   Final Result   Trace right pleural effusion or thickening. Mild interstitial prominence, chronic changes versus component of edema or   atypical infection. Bibasilar subsegmental atelectasis. Mild cardiomegaly. CT CHEST WO CONTRAST   Final Result   1. Moderate right and small left pleural effusions with associated   compressive atelectasis. 2. Elevation of the right hemidiaphragm with underlying moderate perihepatic   ascites. 3. Moderate cardiomegaly and small pericardial effusion. 4. Endotracheal tube, gastric tube, and right jugular tunneled implanted   hemodialysis catheter with good positioning. RECOMMENDATIONS:   Careful clinical correlation and follow up recommended. IR GUIDED THORACENTESIS PLEURAL   Final Result   Successful ultrasound guided thoracentesis. XR ABDOMEN FOR NG/OG/NE TUBE PLACEMENT   Final Result   1. Endotracheal tube and gastric tube with good positioning. 2. Right jugular hemodialysis catheter remains with good positioning. 3. Improved aeration of the right base with small right pleural effusion now   evident. RECOMMENDATION:   Careful clinical correlation and follow up recommended. XR CHEST PORTABLE   Final Result   1. Endotracheal tube and gastric tube with good positioning. 2. Right jugular hemodialysis catheter remains with good positioning. 3. Improved aeration of the right base with small right pleural effusion now   evident. RECOMMENDATION:   Careful clinical correlation and follow up recommended. XR CHEST PORTABLE   Final Result   Mild pulmonary vascular congestion. Interval worsening of right upper lobe and right middle lobe atelectasis. Persistent right lower lobe atelectasis and/or pneumonitis. Small right pleural effusion.                  PROBLEM LIST:  Principal Problem:    Myxedema coma [facility])  Resolved Problems:    * No resolved hospital problems. *      IMPRESSION:  Myxedema coma, improved  Acute superimposed on chronic hypercapnic respiratory failure  Probable diastolic dysfunction  Recurrent right pleural effusions, all transudative  CKD on dialysis  Status post intubation now extubated after 24 to 36 hours on the ventilator  Status post right thoracentesis for 1000 cc of transudative fluid  Chronic atrial fibrillation      PLAN:  Discussed ongoing need for dialysis with nephrology service given correction of hypothyroidism  PT and OT necessary  Metoprolol for rate control  Recheck chest x-ray in a.m. Ask about linezolid  Continue cefepime for gram-negative rods growing in sputum  Wean off pressors  Labs in a.m. ATTESTATION:  ICU Staff Physician note of personal involvement in Care  As the attending physician, I certify that I personally reviewed the patients history and personally examined the patient to confirm the physical findings described above,  And that I reviewed the relevant imaging studies and available reports. I also discussed the differential diagnosis and all of the proposed management plans with the patient and individuals accompanying the patient to this visit. They had the opportunity to ask questions about the proposed management plans and to have those questions answered. This patient has a high probability of sudden, clinically significant deterioration, which requires the highest level of physician preparedness to intervene urgently. I managed/supervised life or organ supporting interventions that required frequent physician assessment. I devoted my full attention to the direct care of this patient for the amount of time indicated below.   Time I spent with the family or surrogate(s) is included only if the patient was incapable of providing the necessary information or participating in medical decisions - Time devoted to teaching and to any procedures I billed separately is not included.     CRITICAL CARE TIME:  33 minutes    Electronically signed by Symone Jiménez MD on 3/12/2023 at 2:32 PM

## 2023-03-12 NOTE — SIGNIFICANT EVENT
4628- Was notified by the patient's nurse of sustained heart rate in the 140s and a blood pressure in the 50s. 250 cc bolus normal saline administered heart rate responded down in the 120s but returned back to the 140s 150s shortly after bolus was completed. Was notified by the lab at her hemoglobin had dropped from 10.2 down to 7.2 unit of packed red blood cells will be ordered. Normal saline will be administered until packed red blood cells is ready. The patient is alert and oriented there is no active signs of bleeding or bruising.

## 2023-03-12 NOTE — PROGRESS NOTES
NAME: Narendra Khan  MR:  [id number]  :   1950  Admit Date:  3/9/2023      This is a face to face encounter with [facility] of this note, including Diagnosis,  Interval History, Past Medical/Surgical/Family/Social Histories, ROS, physical exam, and Assessment and Plan were copied and pasted from Previous. Updates have been made where noted and reflect current exam and medical decision making from the DOS of this encounter. CHIEF COMPLAINT     ID following for   Chief Complaint   Patient presents with    Hypotension     HISTORY OF PRESENT ILLNESS     Narendra Khan is a 67 y.o. 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 Pt seen and examined 23  In icu   Extubated 3/11  Tmax99.1  S/p blood transfusion  Awake and alert has no c/o      No reported adverse drug reactions.   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]  Elevated troponin [R77.8]  Acute respiratory failure with hypercapnia (HCC) [J96.02]  Chronic renal failure, stage 4 (severe) (HCC) [N18.4]  Hypotension, unspecified hypotension type [I95.9]  Acute on chronic congestive heart failure, unspecified heart failure type ([address] [I50.9]    ID following for   Pt with respiratory failure   Leukopenia   Right pleural effusion h/o + gram stain from pleural fluid  -1000 cc of clear yellow  color pleural fluid drained from patient  -resp cx gpc  -oral candidiasis  Kidney failure recently started on hemo urine cx GNR   LEFT LE HEALED   MRSA neg       /65   Pulse (!) 134   Temp 99.3 °F (37.4 °C) (Bladder)   Resp 15   Ht 5' (1.524 m)   Wt 211 lb 4.8 oz (95.8 kg)   SpO2 100%   BMI 41.27 kg/m²   Temp  Av °F (37.2 °C)  Min: 98.1 °F (36.7 °C)  Max: 99.8 °F (37.7 °C)  CONSTITUTIONAL:  no apparent distress,  pale   ENT:  Normocephalic, atraumatic,     LUNGS:  No increased work of breathing,dec  to auscultation bilaterally, no crackles or wheezing vent  CARDIOVASCULAR:    regular rate and rhythm, normal S1 and S2,  no murmur noted  ABDOMEN:  normal bowel sounds, soft,  -distended, non-tender pannus   MUSCULOSKELETAL:  T swelling  BLE. NEUROLOGIC:  Awake, alert, oriented. Verona Danes SKIN:  normal skin color, texture, turgor and no rashes  Lines:       Central Venous Catheter:  CVC Triple Lumen 03/10/23 Right Femoral (Active)   Central Line Being Utilized Yes 23 040   Criteria for Appropriate Use Hemodynamically unstable, requiring monitoring lines, vasopressors, or volume resuscitation 23 0400   Site Assessment Clean, dry & intact 23 0400   Phlebitis Assessment No symptoms 23 0400   Infiltration Assessment 0 23 0400   Color/Movement/Sensation Capillary refill less than 3 sec 23 0400   Proximal Lumen Color/Status Blue; Infusing;Flushed;Brisk blood return 23 0400   Medial Lumen Status White; Infiltrated; Flushed;Brisk blood return 23 0400   Distal Lumen Color/Status Red; Infusing;Flushed;Brisk blood return 23 0400   [address] checked and tightened 23 0400   Dressing Type Transparent; Antimicrobial 23 0400   Dressing Status Clean, dry & intact 23 0400   Dressing Intervention New 03/10/23 1600           Peripheral Intravenous Line:  Peripheral IV 03/10/23 Left Antecubital (Active)   Site Assessment Clean;Dry; Intact 03/11/23 0400   Line Status Normal saline locked; Flushed;Blood return noted 03/11/23 0400   Line Care Connections checked and tightened 03/11/23 0400   Phlebitis Assessment No symptoms 03/11/23 0400   Infiltration Assessment 0 03/11/23 0400   Alcohol Cap Used No 03/11/23 0400   Dressing Status Dry; Intact 03/11/23 0400   Dressing Type Transparent 03/11/23 0400   Dressing Intervention New 03/10/23 1600           Drain(s):  NG/OG/NJ/NE Tube Nasogastric 14 fr Left nostril (Active)   Surrounding Skin Clean, dry & intact 03/10/23 2151   Securement device Tape 03/10/23 2151   Status Suction-low intermittent 03/10/23 2151   Placement Verified X-Ray (Initial) 03/10/23 2151   NG/OG/NJ/NE External Measurement (cm) 70 cm 03/10/23 2151   Drainage Appearance Bile 03/10/23 2151   Free Water/Flush (mL) 80 mL 03/10/23 2151   Output (mL) 200 ml 03/10/23 1713       Urinary Catheter 03/10/23 Diaz-Temperature (Active)   $ Urethral catheter insertion $ Not inserted for procedure 03/10/23 0150   Catheter Indications Need for fluid volume management of the critically ill patient in a critical care setting 03/11/23 0400   Urine Color Straw 03/11/23 0400   Urine Appearance Clear 03/11/23 0400   Collection Container Standard 03/11/23 0400   Securement Method Leg strap 03/11/23 0400   Catheter Care  Soap and water 03/10/23 0800   Status Draining 03/11/23 0400   Output (mL) 450 mL 03/11/23 0400        Microbiology:   Recent Labs     03/10/23  0001   COVID19 Not Detected       Lab Results   Component Value Date/Time    BC 24 Hours no growth 03/10/2023 12:01 AM    BC 5 Days no growth 01/17/2023 05:23 PM    BC 5 Days no growth 10/12/2022 04:59 PM    BLOODCULT2 24 Hours no growth 03/10/2023 12:01 AM    BLOODCULT2 5 Days no growth 01/17/2023 05:23 PM    BLOODCULT2 5 Days no growth 10/12/2022 04:59 PM    ORG Gram negative josh 03/10/2023 03:25 AM    ORG Enterococcus faecium 01/18/2023 03:26 AM    ORG Staphylococcus epidermidis 01/17/2023 10:10 PM     CULTURE, RESPIRATORY   Date Value Ref Range Status   07/06/2022 Oral Pharyngeal Shavon absent (A)  Final   07/06/2022 Light growth  Final        WOUND/ABSCESS   Date Value Ref Range Status   01/17/2023 Light growth  Final   07/05/2022 Heavy growth  Final   07/05/2022 Heavy growth  Final     Smear, Respiratory   Date Value Ref Range Status   03/10/2023   Final    Moderate Polymorphonuclear leukocytes  Few Epithelial cells  Rare yeast  Rare Gram positive cocci           Component Value Date/Time    AFBCX No growth after 6 weeks of incubation. 10/18/2022 1106    AFBCX No growth after 6 weeks of incubation. 07/08/2022 1546    FUNGSM No Fungal elements seen 07/08/2022 1546    LABFUNG No growth after 4 weeks of incubation.  07/08/2022 1546       MRSA Culture Only   Date Value Ref Range Status   03/10/2023 Methicillin resistant Staph aureus not isolated  Final       LABS:  Recent Labs     03/10/23  0440 03/11/23  0421 03/12/23  0434   WBC 11.4 9.9 4.0*   RBC 4.12 4.09 2.87*   HGB 10.1* 10.2* 7.2*   HCT 40.1 35.9 25.7*   MCV 97.3 87.8 89.5   MCH 24.5* 24.9* 25.1*   MCHC 25.2* 28.4* 28.0*   RDW 20.8* 19.8* 19.6*    189 108*   MPV 10.5 10.2 10.7       Recent Labs     03/10/23  0001 03/10/23  0440 03/11/23 0421 03/12/23  0434    138 135 137   K 4.0 4.2 3.7 3.0*   CL 99 98 93* 97*   CO2 31* 32* 27 29   BUN 16 17 18 33*   CREATININE 2.9* 2.8* 1.9* 2.5*   GLUCOSE 85 141* 211* 129*   PROT 6.0*  --  6.4  6.5  --    LABALBU 3.8  --  4.1  --    CALCIUM 8.7 9.3 9.0 8.4*   BILITOT 0.5  --  1.0  --    ALKPHOS 81  --  94  --    AST 6  --  7  --    ALT <5  --  <5  --        Lab Results   Component Value Date    SEDRATE 6 07/06/2022    SEDRATE 15 10/24/2021    SEDRATE 115 (H) 02/03/2021     Lab Results   Component Value Date    CRP 5.0 (H) 07/06/2022    CRP 0.7 (H) 10/25/2021    CRP 13.5 (H) 02/03/2021     Lab Results   Component Value Date    PROCAL 0.40 (H) 03/10/2023    PROCAL 0.89 (H) 02/16/2023    PROCAL 0.33 (H) 01/18/2023     Recent Labs     03/10/23  0001 03/11/23  0421   LDH  --  239*   INR 2.5  --    PROTIME 29.2*  --    AST 6 7   ALT <5 <5         REVIEW OF SYSTEMS     As stated above in the chief complaint, otherwise negative.   CURRENT MEDICATIONS     Current Facility-Administered Medications:     0.9 % sodium chloride infusion, , IntraVENous, PRN, Reji Fret, APRN - CNP    phenylephrine (JIMENA-SYNEPHRINE) 50 mg in sodium chloride 0.9 % 250 mL infusion,  mcg/min, IntraVENous, Continuous, Reji Fret, APRN - CNP, Last Rate: 9 mL/hr at 03/12/23 0611, 30 mcg/min at 03/12/23 0611    [START ON 3/13/2023] epoetin josé luis-epbx (RETACRIT) injection 2,880 Units, 30 Units/kg, SubCUTAneous, Once per day on Mon Wed Fri, [facility], APR[facility] - CNP    fluconazole (DIFLUCAN) in 0.9 % sodium chloride IVPB 200 mg, 200 mg, IntraVENous, Q24H, Talisha Elizabeth MD, Last Rate: 100 mL/hr at 03/12/23 0837, 200 mg at 03/12/23 0837    pantoprazole (PROTONIX) 40 mg in sodium chloride (PF) 0.9 % 10 mL injection, 40 mg, IntraVENous, Daily, Armaan Ley MD, 40 mg at 03/12/23 0839    glucose chewable tablet 16 g, 4 tablet, Oral, PRN, Ace Barone MD    dextrose bolus 10% 125 mL, 125 mL, IntraVENous, PRN **OR** dextrose bolus 10% 250 mL, 250 mL, IntraVENous, PRN, Armaan Ley MD    glucagon (rDNA) injection 1 mg, 1 mg, SubCUTAneous, PRN, Armaan Ley MD    dextrose 10 % infusion, , IntraVENous, Continuous PRN, Armaan Ley MD    apixaban Zernick Murraysar) tablet 5 mg, 5 mg, Oral, BID, Alvaro Wu MD, 5 mg at 03/11/23 2046    insulin lispro (HUMALOG) injection vial 0-4 Units, 0-4 Units, SubCUTAneous, Nightly, Armaan Ley MD, 4 Units at 03/11/23 2051    insulin glargine (LANTUS) injection vial 8 Units, 8 Units, SubCUTAneous, BID, Armaan Ley MD, 8 Units at 03/12/23 0841    insulin lispro (HUMALOG) injection vial 0-8 Units, 0-8 Units, SubCUTAneous, TID WC, Paulo Kohli MD, 8 Units at 03/11/23 1657    QUEtiapine (SEROQUEL) tablet 25 mg, 25 mg, Oral, Nightly, RASHARD Zelaya CNP, 25 mg at 03/11/23 2350    hydrocortisone sodium succinate PF (SOLU-CORTEF) injection 100 mg, 100 mg, IntraVENous, Q8H, RASHARD Zelaya CNP, 100 mg at 03/12/23 0949    levothyroxine (SYNTHROID) injection 100 mcg, 100 mcg, IntraVENous, Daily, RASHARD Zelaya CNP, 100 mcg at 03/12/23 0592    sodium chloride flush 0.9 % injection 10 mL, 10 mL, IntraVENous, 2 times per day, Shayla Stapleton MD, 10 mL at 03/12/23 0838    sodium chloride flush 0.9 % injection 10 mL, 10 mL, IntraVENous, PRN, Shayla Stapleton MD, 10 mL at 03/11/23 1956    0.9 % sodium chloride infusion, , IntraVENous, PRN, Shayla Stapleton MD, Stopped at 03/11/23 0921    promethazine (PHENERGAN) tablet 12.5 mg, 12.5 mg, Oral, Q6H PRN **OR** ondansetron (ZOFRAN) injection 4 mg, 4 mg, IntraVENous, Q6H PRN, Shayla Stapleton MD    polyethylene glycol (GLYCOLAX) packet 17 g, 17 g, Oral, Daily PRN, Shayla Stapleton MD    acetaminophen (TYLENOL) tablet 650 mg, 650 mg, Oral, Q6H PRN, 650 mg at 03/12/23 0839 **OR** acetaminophen (TYLENOL) suppository 650 mg, 650 mg, Rectal, Q6H PRN, Shayla Stapleton MD    DOPamine (INTROPIN) 800 mg in dextrose 5 % 250 mL infusion, 1-20 mcg/kg/min, IntraVENous, Continuous, RASHARD Zelaya CNP, Stopped at 03/11/23 1743    budesonide (PULMICORT) nebulizer suspension 500 mcg, 0.5 mg, Nebulization, BID, RASHARD Zelaya CNP, 500 mcg at 03/12/23 0631    albuterol (PROVENTIL) nebulizer solution 2.5 mg, 2.5 mg, Nebulization, Q4H WA, 2.5 mg at 03/12/23 0935 **AND** ipratropium (ATROVENT) 0.02 % nebulizer solution 0.5 mg, 0.5 mg, Nebulization, Q4H WA, RASHARD Zelaya - CNP, 0.5 mg at 03/12/23 0935    cefepime (MAXIPIME) 1,000 mg in sodium chloride 0.9 % 50 mL IVPB ([id number]), 1,000 mg, IntraVENous, Q12H, Nicholas Romano MD, Stopped at 03/12/23 0820    linezolid (ZYVOX) tablet 600 mg, 600 mg, Oral, 2 times per day, Aranza Henriquez MD, 600 mg at 03/12/23 0839    [START ON 3/13/2023] midodrine (PROAMATINE) tablet 10 mg, 10 mg, Per NG tube, Q MWF, Wang Napoles, APRN - CNP  DIAGNOSTIC RESULTS   Radiology:  CT CHEST WO CONTRAST    Result Date: 3/11/2023  EXAMINATION: CT OF THE CHEST WITHOUT CONTRAST 3/10/2023 4:39 pm TECHNIQUE: CT of the chest was performed without the administration of intravenous contrast. Multiplanar reformatted images are provided for review. Automated exposure control, iterative reconstruction, and/or weight based adjustment of the mA/kV was utilized to reduce the radiation dose to as low as reasonably achievable. COMPARISON: Radiographs of the same day. HISTORY: ORDERING SYSTEM PROVIDED HISTORY: Pleural effusion TECHNOLOGIST PROVIDED HISTORY: Reason for exam:->Pleural effusion FINDINGS: Mediastinum: Tunnel right jugular hemodialysis catheter tip well positioned proximal right atrium. Endotracheal tube tip with good position 2.6 cm above the juan francisco. Gastric tube with good position proximal side hole beneath the hemidiaphragms. Normal caliber and contour of the thoracic aorta. No mediastinal adenopathy detected. Moderate cardiomegaly. Small pericardial effusion. Lungs/pleura: Moderate right and small left pleural effusions with associated compressive atelectasis. Mild re-expansion edema through the right lung, status post thoracentesis noted. Elevated right hemidiaphragm with moderate perihepatic ascites observed in the upper abdomen. Upper Abdomen: Moderate mainly perihepatic ascites. Gastric tube with good positioning. Soft Tissues/Bones: No acute osseous or body wall soft tissue abnormalities. 1. Moderate right and small left pleural effusions with associated compressive atelectasis. 2. Elevation of the right hemidiaphragm with underlying moderate perihepatic ascites.  3. Moderate cardiomegaly and small pericardial effusion. 4. Endotracheal tube, gastric tube, and right jugular tunneled implanted hemodialysis catheter with good positioning. RECOMMENDATIONS: Careful clinical correlation and follow up recommended. IR FLUORO GUIDED CVA DEVICE PLMT/REPLACE/REMOVAL    Result Date: 2/9/2023  PROCEDURE: IR FLUOROSCOPY GUIDED CENTRAL VENOUS ACCESS DEVICE PLACEMENT; US GUIDED VASCULAR ACCESS MODERATE CONSCIOUS SEDATION 2/9/2023 HISTORY: ORDERING SYSTEM PROVIDED HISTORY: Tunneled HD line along with separate iv access TECHNOLOGIST PROVIDED HISTORY: Reason for exam:->Tunneled HD line along with separate iv access TECHNIQUE: Ultrasound and fluoroscopic guided CONTRAST: None SEDATION: Moderate sedation was ordered and supervised by the attending with physician face-to-face monitoring. Medications were provided and recorded by Radiology nurses. The administration, documentation and monitoring of IV conscious sedation under my direct supervision for time frame of 20 minutes. 50 mcg of IV fentanyl was administered. Interventional radiology nursing staff monitored the patient the throughout the exam. FLUOROSCOPY DOSE AND TYPE: Radiation Exposure Indices: Fluoroscopy time equals 0.3 minutes. Total dose equals 3.29 mGy DESCRIPTION OF PROCEDURE: Informed consent was obtained after a detailed explanation of the procedure including risks, benefits, and alternatives. Universal protocol was observed. Sterile gowns, masks, hats and gloves utilized for maximal sterile barrier. FINDINGS: The patient's neck was prepped and draped in a sterile fashion using maximum sterile barrier technique. Ultrasound images demonstrate a patent right internal jugular vein. Following the uneventful administration of lidocaine using ultrasound guidance I introduced a micro puncture needle into the right internal jugular vein. Through the micro needle a microwire was advanced. Over the microwire a micro sheath was placed.   Through the micro sheath an Amplatz wire was advanced into the superior vena cava. 2 dilators were used to dilate a tract into the right internal jugular vein. I then anesthetized a tract along the chest wall measuring 10 cm. Using a blunt tunneling device I tunneled the catheter to the dilator within the right internal jugular vein. Over the Amplatz wire peel-away sheath was placed. Through the peel-away sheath the dual lumen 15 [place] 28 cm dialysis catheter was placed. The catheter tip is positioned at the SVC right atrial junction. The patient tolerated the procedure well and there were no complications. The catheter was then secured to the skin with 2-0 silk suture. The incision overlying the right internal jugular vein was also sutured with 2-0 silk suture. Successful placement of a tunneled dialysis catheter via the right internal jugular vein. Administration of conscious sedation. XR CHEST PORTABLE    Result Date: 3/10/2023  EXAMINATION: ONE XRAY VIEW OF THE CHEST; ONE SUPINE XRAY VIEW(S) OF THE ABDOMEN 3/10/2023 5:49 am COMPARISON: 4 hours prior. HISTORY: ORDERING SYSTEM PROVIDED HISTORY: ett placement TECHNOLOGIST PROVIDED HISTORY: Reason for exam:->ett placement; ORDERING SYSTEM PROVIDED HISTORY: Confirmation of course of NG/OG/NE tube and location of tip of tube TECHNOLOGIST PROVIDED HISTORY: Reason for exam:->Confirmation of course of NG/OG/NE tube and location of tip of tube Portable? ->Yes FINDINGS: Endotracheal tube tip with good positioning 3.7 cm above the juan francisco. Gastric tube with good position, proximal side hole beneath the hemidiaphragms. Right jugular hemodialysis catheter remains with good positioning. Obscuration of the right cardiac base and right hemidiaphragm. Improving aeration right base. Small right pleural effusion noted. No pneumothorax. Body wall soft tissues unremarkable. Osseous thorax intact. 1. Endotracheal tube and gastric tube with good positioning.  2. Right jugular hemodialysis catheter remains with good positioning. 3. Improved aeration of the right base with small right pleural effusion now evident. RECOMMENDATION: Careful clinical correlation and follow up recommended. XR CHEST PORTABLE    Result Date: 3/10/2023  EXAMINATION: ONE XRAY VIEW OF THE CHEST 3/9/2023 11:39 pm COMPARISON: Chest one view, 02/23/2023 HISTORY: ORDERING SYSTEM PROVIDED HISTORY: hypotension TECHNOLOGIST PROVIDED HISTORY: Reason for exam:->hypotension FINDINGS: Lines, tubes, and devices: There is stable position of right internal jugular tip localized at the atriocaval junction. Lungs and pleura: There is mild pulmonary vascular congestion and persistent small right pleural effusion. There is interval worsening atelectasis in the right middle lobe and right upper lobe. Right lower lobe atelectasis and/or pneumonitis persists. No pneumothorax. Cardiomediastinal silhouette: The mediastinum is stable. The heart size is normal. Bones and soft tissues: There are surgical clips in right axilla. Surgical clips are superimposed over right upper quadrant. Mild pulmonary vascular congestion. Interval worsening of right upper lobe and right middle lobe atelectasis. Persistent right lower lobe atelectasis and/or pneumonitis. Small right pleural effusion. XR CHEST PORTABLE    Result Date: 2/23/2023  EXAMINATION: ONE XRAY VIEW OF THE CHEST 2/23/2023 3:58 pm COMPARISON: February 23, 2023 HISTORY: ORDERING SYSTEM PROVIDED HISTORY: post procedure TECHNOLOGIST PROVIDED HISTORY: Reason for exam:->post procedure FINDINGS: Cardiomegaly. Lungs clear with mild right basilar atelectasis. No pneumothorax or effusion. Osseous thorax intact. Right jugular hemodialysis catheter tip remains well positioned at the cavoatrial junction. No acute disease. RECOMMENDATION: Careful clinical correlation and follow up recommended.      XR CHEST PORTABLE    Result Date: 2/22/2023  EXAMINATION: ONE XRAY VIEW OF THE CHEST 2/22/2023 2:36 pm COMPARISON: 10/16/2023. HISTORY: ORDERING SYSTEM PROVIDED HISTORY: pleural effusion evaluation TECHNOLOGIST PROVIDED HISTORY: Reason for exam:->pleural effusion evaluation FINDINGS: The lungs are without acute focal process. There is no effusion or pneumothorax. Cardiomegaly. The osseous structures are without acute process. Right-sided central line catheter at the caval atrial junction. No sizable pleural effusion. XR CHEST PORTABLE    Result Date: 2/16/2023  EXAMINATION: ONE XRAY VIEW OF THE CHEST 2/16/2023 6:33 am COMPARISON: 02/15/2023 HISTORY: ORDERING SYSTEM PROVIDED HISTORY: F/U pleural effusion TECHNOLOGIST PROVIDED HISTORY: Reason for exam:->F/U pleural effusion FINDINGS: Moderate cardiomegaly is unchanged. There is mild pulmonary venous congestion. Right IJ dialysis catheter is stable. Prior noted infiltrate versus atelectasis at the left lung base has resolved. There is improved aeration the right upper lobe with minimal residual apical density. No pneumothorax is identified. Bony structures are unremarkable. Improvement in bilateral lung infiltrates as described. XR CHEST PORTABLE    Result Date: 2/15/2023  EXAMINATION: ONE XRAY VIEW OF THE CHEST 2/15/2023 5:28 am COMPARISON: 14 February 2023 HISTORY: ORDERING SYSTEM PROVIDED HISTORY: SOB TECHNOLOGIST PROVIDED HISTORY: Reason for exam:->SOB FINDINGS: Unchanged right-sided central venous catheter. A right pleural effusion appears diminished. There is now new opacity at the right upper lobe which could be infiltrate and or atelectasis. There is unchanged infiltrate and or atelectasis at the left lower lobe is well. Diminished right pleural effusion. New infiltrate and or atelectasis at the right upper lobe. Stable infiltrate and or atelectasis at the left lower lobe.      XR CHEST 1 VIEW    Result Date: 2/14/2023  EXAMINATION: ONE XRAY VIEW OF THE CHEST 2/14/2023 3:28 pm COMPARISON: 02/09/2023 HISTORY: ORDERING SYSTEM PROVIDED HISTORY: Post Chest Tube Placement TECHNOLOGIST PROVIDED HISTORY: Reason for exam:->Post Chest Tube Placement FINDINGS: There is cardiomegaly. Large pleural effusion with infiltrates and atelectasis in the right lung is identified with small amount of ventilated right upper lobe. There is infiltrates and effusions left base as well. A right chest tube is noted in the right lung base which may be partially kinked. There is a right IJ dialysis catheter. Stable abnormal chest with slightly improved ventilation of the right lung with persistent large right pleural effusion with infiltrates and atelectasis in the right lung base and to a lesser extent in the left base. Right chest tube which may be partially kinked. There is no pneumothorax. XR ABDOMEN FOR NG/OG/NE TUBE PLACEMENT    Result Date: 3/10/2023  EXAMINATION: ONE XRAY VIEW OF THE CHEST; ONE SUPINE XRAY VIEW(S) OF THE ABDOMEN 3/10/2023 5:49 am COMPARISON: 4 hours prior. HISTORY: ORDERING SYSTEM PROVIDED HISTORY: ett placement TECHNOLOGIST PROVIDED HISTORY: Reason for exam:->ett placement; ORDERING SYSTEM PROVIDED HISTORY: Confirmation of course of NG/OG/NE tube and location of tip of tube TECHNOLOGIST PROVIDED HISTORY: Reason for exam:->Confirmation of course of NG/OG/NE tube and location of tip of tube Portable? ->Yes FINDINGS: Endotracheal tube tip with good positioning 3.7 cm above the juan francisco. Gastric tube with good position, proximal side hole beneath the hemidiaphragms. Right jugular hemodialysis catheter remains with good positioning. Obscuration of the right cardiac base and right hemidiaphragm. Improving aeration right base. Small right pleural effusion noted. No pneumothorax. Body wall soft tissues unremarkable. Osseous thorax intact. 1. Endotracheal tube and gastric tube with good positioning. 2. Right jugular hemodialysis catheter remains with good positioning.  3. Improved aeration of the right base with small right pleural effusion now evident. RECOMMENDATION: Careful clinical correlation and follow up recommended. IR ULTRASOUND GUIDANCE VASCULAR ACCESS    Result Date: 2/9/2023  PROCEDURE: IR FLUOROSCOPY GUIDED CENTRAL VENOUS ACCESS DEVICE PLACEMENT; US GUIDED VASCULAR ACCESS MODERATE CONSCIOUS SEDATION 2/9/2023 HISTORY: ORDERING SYSTEM PROVIDED HISTORY: Tunneled HD line along with separate iv access TECHNOLOGIST PROVIDED HISTORY: Reason for exam:->Tunneled HD line along with separate iv access TECHNIQUE: Ultrasound and fluoroscopic guided CONTRAST: None SEDATION: Moderate sedation was ordered and supervised by the attending with physician face-to-face monitoring. Medications were provided and recorded by Radiology nurses. The administration, documentation and monitoring of IV conscious sedation under my direct supervision for time frame of 20 minutes. 50 mcg of IV fentanyl was administered. Interventional radiology nursing staff monitored the patient the throughout the exam. FLUOROSCOPY DOSE AND TYPE: Radiation Exposure Indices: Fluoroscopy time equals 0.3 minutes. Total dose equals 3.29 mGy DESCRIPTION OF PROCEDURE: Informed consent was obtained after a detailed explanation of the procedure including risks, benefits, and alternatives. Universal protocol was observed. Sterile gowns, masks, hats and gloves utilized for maximal sterile barrier. FINDINGS: The patient's neck was prepped and draped in a sterile fashion using maximum sterile barrier technique. Ultrasound images demonstrate a patent right internal jugular vein. Following the uneventful administration of lidocaine using ultrasound guidance I introduced a micro puncture needle into the right internal jugular vein. Through the micro needle a microwire was advanced. Over the microwire a micro sheath was placed.   Through the micro sheath an Amplatz wire was advanced into the superior vena cava. 2 dilators were used to dilate a tract into the right internal jugular vein. I then anesthetized a tract along the chest wall measuring 10 cm. Using a blunt tunneling device I tunneled the catheter to the dilator within the right internal jugular vein. Over the Amplatz wire peel-away sheath was placed. Through the peel-away sheath the dual lumen 15 Portuguese 28 cm dialysis catheter was placed. The catheter tip is positioned at the SVC right atrial junction. The patient tolerated the procedure well and there were no complications. The catheter was then secured to the skin with 2-0 silk suture. The incision overlying the right internal jugular vein was also sutured with 2-0 silk suture. Successful placement of a tunneled dialysis catheter via the right internal jugular vein. Administration of conscious sedation. IR GUIDED THORACENTESIS PLEURAL    Result Date: 3/10/2023  PROCEDURE: ULTRASOUNDGUIDED RIGHT THORACENTESIS 3/10/2023 HISTORY: ORDERING SYSTEM PROVIDED HISTORY: right pleural effusion/right thoracentesis, send labs please TECHNOLOGIST PROVIDED HISTORY: right pleural effusion/right thoracentesis, send labs please Reason for exam:->right pleural effusion/right thoracentesis, send labs please Which side should the procedure be performed?->Right TECHNIQUE: Informed consent was obtained after a detailed explanation of the procedure including risks, benefits, and alternatives. Universal protocol was performed. The right chest was prepped and draped in sterile fashion and local anesthesia was achieved with lidocaine. An 8 Portuguese needle sheath was advanced under ultrasound guidance into pleural effusion and thoracentesis was performed. The patient tolerated the procedure well. FINDINGS: A total of 1000 cc was removed. Successful ultrasound guided thoracentesis.      IR GUIDED THORACENTESIS PLEURAL    Result Date: 2/14/2023  PROCEDURE: Grayce Denver RIGHT THORACENTESIS 2/14/2023 HISTORY: ORDERING SYSTEM PROVIDED HISTORY: Pleural Effusion TECHNOLOGIST PROVIDED HISTORY: Reason for exam:->Pleural Effusion Which side should the procedure be performed?->Right TECHNIQUE: Informed consent was obtained after a detailed explanation of the procedure including risks, benefits, and alternatives. Universal protocol was performed. The right chest was prepped and draped in sterile fashion and local anesthesia was achieved with lidocaine. An 8 Syrian needle sheath was advanced under ultrasound guidance into pleural effusion and thoracentesis was performed. The patient tolerated the procedure well. FINDINGS: A total of 1000 cc was removed. Successful ultrasound guided thoracentesis. IR GUIDED PERC PLEURAL DRAIN W CATH INSERT    Result Date: 2/14/2023  PROCEDURE: IR PERC CATH PLEURAL DRAIN W/IMAG 2/14/2023 HISTORY: ORDERING SYSTEM PROVIDED HISTORY: Recurrent pleural effusion TECHNOLOGIST PROVIDED HISTORY: Reason for exam:->Recurrent pleural effusion Which side should the procedure be performed?->Right TECHNIQUE: Ultrasound-guided CONTRAST: None SEDATION: None FLUOROSCOPY DOSE AND TYPE: None DESCRIPTION OF PROCEDURE: Informed consent was obtained after a detailed explanation of the procedure including risks, benefits, and alternatives. Universal protocol was observed. Sterile gowns, masks, hats and gloves utilized for maximal sterile barrier. FINDINGS: Ultrasound images of the right pleural space were obtained Note is made of a large right pleural effusion The patient has right back was prepped and draped in a sterile fashion maximum sterile barrier technique. Following the uneventful administration of lidocaine I introduced a 5 [place] Yueh catheter into the pleural space. I then anesthetized a 10 cm tract along the posterior chest wall. Using a blunt tunneling device I tunneled the PleurX catheter to the Yueh catheter. Through the [facility] catheter an Amplatz wire was advanced into the pleural space.   Over the Amplatz wire multiple dilators were used to dilate a tract into the right pleural space. The PleurX catheter was then introduced into the peel-away sheath and position within the pleural space. The patient tolerated the procedure well and no complications. A sterile dressing was placed over the entry sites.     1. Successful placement of a PleurX catheter within the right pleural space 2. 1000 cc of fluid was aspirated.     IR REMOVAL OF TUNNELED PLEURAL CATH W CUFF    Result Date: 2/23/2023  PROCEDURE: IR REMOVAL TUNNELED PLEURAL CATHETER 2/23/2023 HISTORY: ORDERING SYSTEM PROVIDED HISTORY: PleuRX Catheter removal TECHNOLOGIST PROVIDED HISTORY: Reason for exam:->PleuRX Catheter removal Which side should the procedure be performed?->Radiologist Recommendation TECHNIQUE: Surgical removal of the indwelling PleurX catheter CONTRAST: None SEDATION: None FLUOROSCOPY DOSE AND TYPE: None DESCRIPTION OF PROCEDURE: Informed consent was obtained after a detailed explanation of the procedure including risks, benefits, and alternatives.  Universal protocol was observed. Sterile gowns, masks, hats and gloves utilized for maximal sterile barrier. FINDINGS: The patient's right side was prepped and draped in a sterile fashion maximum sterile barrier technique.  Following the uneventful administration of lidocaine I manual remove the indwelling PleurX catheter.  A sterile dressing was placed over the insertion site.     Successful removal of the indwelling right PleurX catheter.            Imaging and labs were reviewed per medical records.     Thank you for involving me in the care of Janneth Smith I will continue to follow. Please do not hesitate to call [phone] for any questions or concerns.    Electronically signed by Doris English MD on 3/12/2023 at 10:28 AM

## 2023-03-12 NOTE — PROGRESS NOTES
Physical Therapy    Physical Therapy Initial Evaluation/Plan of Care    Room #:  IC03/IC03-01  Patient Name: Conor Tracey  YOB: 1950  MRN: [id number]    Date of Service: 3/12/2023     Tentative placement recommendation: Modesto Chamberlain with Physical Therapy   Patient may be unsafe for ambulette due to impaired seated balance, sitting endurance  Equipment recommendation: Equipment at Nursing Home      Evaluating Physical Therapist: Jamee Telles  #[id number]      Specific Provider Orders/Date/Referring Provider :  03/11/23 1915    PT eval and treat  Start:  03/11/23 1915,   End:  03/11/23 1915,   ONE TIME,   Standing Count:  1 Occurrences,   R         Nat Joel, APRN - CNP     Admitting Diagnosis:   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]  Elevated troponin [R77.8]  Acute respiratory failure with hypercapnia (HCC) [J96.02]  Chronic renal failure, stage 4 (severe) ([facility]) [N18.4]  Hypotension, unspecified hypotension type [I95.9]  Acute on chronic congestive heart failure, unspecified heart failure type ([facility] [I50.9]    Admitted with    low heart rate, hypotension and weakness, critical labs; uti, post blood transfusion  Intubated 3/10-3/11  S/p thoracentesis  Surgery: none  Visit Diagnoses         Codes    Chronic renal failure, stage 4 (severe) (HCC)     N18.4    Elevated troponin     R77.8    Postprocedural pneumothorax     J95.811            Patient Active Problem List   Diagnosis    Depression with anxiety    Osteoporosis    Asthma    Hyperlipidemia    Mitral regurgitation    Obstructive sleep apnea syndrome    Psoriasis    Diabetes mellitus ([facility]    Parkinson's disease ([facility]    Primary hypertension    Microalbuminuria    Morbid obesity ([facility]    RLS (restless legs syndrome)    Generalized weakness    Inability to walk    Hypothyroidism    Chest pain    Acute asthma exacerbation    Asthma exacerbation, mild    Paroxysmal atrial fibrillation (HCC)    Atrial fibrillation with rapid ventricular response ([facility]    Acute on chronic congestive heart failure ([address]    Coronary artery disease involving native coronary artery of native heart without angina pectoris    Dysphagia    Hepatosplenomegaly    Acute decompensated heart failure (HCC)    Acute diastolic (congestive) heart failure (HCC)    Nonrheumatic tricuspid valve regurgitation    Tobacco abuse    Recurrent syncope    Septic shock (HCC)    Seizure-like activity (HCC)    Acute kidney injury ([address]    Pancytopenia (HCC)    Thrombocytopenia (HCC)    Encephalopathy    Acute respiratory failure with hypoxia (HCC)    Lactic acidosis    Delirium    Acute on chronic anemia    Pleural effusion, right    Acute respiratory failure with hypoxia and hypercapnia (HCC)    Acute confusion    Acute on chronic diastolic heart failure (HCC)    Macrocytosis    Other specified anemias    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[facility]    Puerperal sepsis with acute hypoxic respiratory failure without septic shock (HCC)    Pulmonary HTN (HCC)    Chronic anticoagulation    RVF (right ventricular failure) (HCC)    Metabolic alkalosis    Sepsis (HCC)    COPD exacerbation (HCC)    Acute on chronic respiratory failure with hypercapnia (HCC)    Persistent atrial fibrillation (HCC)    Hypercapnic respiratory failure (HCC)    Acute on chronic respiratory failure (HCC)    Hyperkalemia    Heart failure (HCC)    Acute renal insufficiency    Hypotension    Anemia    Acute on chronic respiratory failure with hypoxia and hypercapnia (HCC)    Palliative care encounter    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 Decreased strength, balance and endurance  increases patient's risk for fall.    Elevated hr rate (a fib) and patient's fear of falling limit mobility to edge of bed and exercise. Pt has left posterior lean increasing risk for fall      PHYSICAL THERAPY  PLAN OF CARE       Physical therapy plan of care is established based on physician order,  patient diagnosis and clinical assessment    Current Treatment Recommendations:    -Bed Mobility: Lower extremity exercises , Upper extremity exercises , and Trunk control activities   -Sitting Balance: Incorporate reaching activities to activate trunk muscles , Hands on support to maintain midline , Facilitate active trunk muscle engagement , Facilitate postural control in all planes , and Engage in core activities to allow for movement within base of support   -Transfers: Provide instruction on proper hand and foot position for adequate transfer of weight onto lower extremities and use of gait device if needed and Cues for hand placement, technique and safety. Provide stabilization to prevent fall   -Endurance: Utilize Supervised activities to increase level of endurance to allow for safe functional mobility including transfers and gait     PT long term treatment goals are located in below grid    Patient and or family understand(s) diagnosis, prognosis, and plan of care. Frequency of treatments: Patient will be seen  3-5 times/week.          Prior Level of Function: Patient performing transfers in therapy per patient  Rehab Potential: fair   for baseline    Past medical history:   Past Medical History:   Diagnosis Date    A-fib ([facility]     Acute on chronic congestive heart failure (HCC)     Anxiety     Asthma     CAD (coronary artery disease) 01/21/2016    Cancer ([facility]  breast ca 2006    right lumpectomy    Chronic kidney disease     nephrolithiasis    COPD exacerbation ([facility] 10/12/2022    Depression     Diabetes mellitus ([facility]     H/O mammogram     Hemodialysis patient [facility])     Hx MRSA infection     toe infection january 2012    Hyperlipidemia     Hypertension Lateral epicondylitis     Morbid obesity (HCC)     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 Mitch Tran  01/11/2022    Dr. Asif Commander  7/29/15    CT GUIDED CHEST TUBE  7/8/2022    CT GUIDED CHEST TUBE 7/8/2022 Laurie Licona MD [facility]    ENDOSCOPY, COLON, DIAGNOSTIC  7/19/15    GALLBLADDER SURGERY      IR PERC CATH PLEURAL DRAIN W/IMAG  2/14/2023    IR PERC CATH PLEURAL DRAIN W/IMAG 2/14/2023 [facility]    IR REMOVAL OF TUNNELED PLEURAL CATH W CUFF  2/23/2023    IR REMOVAL OF TUNNELED PLEURAL CATH W CUFF 2/23/2023 [facility]    LUMBAR LAMINECTOMY      TOE AMPUTATION      TONSILLECTOMY      UPPER GASTROINTESTINAL ENDOSCOPY      UPPER GASTROINTESTINAL ENDOSCOPY N/A 7/19/2022    EGD ESOPHAGOGASTRODUODENOSCOPY performed by Fox Kelly MD at [address]:    Precautions: Bedrest with bathroom Privileges , falls, alarm, O2, and contact isolation , vre, 2 Liters of o2 via nasal cannula   Femoral line    Social history: Patient lives in a skilled nursing facility      Equipment owned: Wheelchair, Wheeled Walker, and O2,       [address] [facility] - Inpatient   How much help is needed turning from your back to your side while in a flat bed without using bedrails?: A Lot  How much help is needed moving from lying on your back to sitting on the side of a flat bed without using bedrails?: A Lot  How much help is needed moving to and from a bed to a chair?: Total  How much help is needed standing up from a chair using your arms?: Total  How much help is needed walking in hospital room?: Total  How much help is needed climbing 3-5 steps with a railing?: Total  AM-PAC Inpatient Mobility Raw Score : 8  AM-PAC Inpatient T-Scale Score : 28.52  Mobility Inpatient CMS 0-100% Score: 86.62  Mobility Inpatient CMS G-Code Modifier : CM    Nursing cleared patient for PT evaluation. The admitting diagnosis and active problem list as listed above have been reviewed prior to the initiation of this evaluation. OBJECTIVE;   Initial Evaluation  Date: 3/12/2023 Treatment Date:     Short Term/ Long Term   Goals   Was pt agreeable to Eval/treatment? Yes  To be met in 5 days   Pain level   0/10        Bed Mobility    Using rails and head of bed elevated    Rolling: Maximal assist of 1    Supine to sit: Maximal assist of  2    Sit to supine: Maximal assist of  2    Scooting: Maximal assist of  2    Rolling: Moderate assist of 1    Supine to sit: Moderate assist of 1    Sit to supine: Moderate assist of 1    Scooting: Moderate assist of 1     Transfers Sit to stand: Not assessed due to pt overall debility, decreased activity tolerance, balance deficits, safety and fall risk. Sit to stand:  Moderate assist of  2 using mariano stedy   ROM Within functional limits        Strength BUE:   3+/5  RLE:  3-/5  LLE:  3-/5  Increase strength in affected mm groups by 1/3 grade   Balance Sitting EOB:  poor left posterior lean   Sitting EOB:  fair        Patient is Alert & Oriented x person and situation and follows one step directions  Short term memory deficit   Sensation:  Patient  denies numbness/tingling   Edema:  yes bilateral lower extremities and bilateral upper extremities   Endurance: poor fatigues easily,     Vitals:  2 liters nasal cannula   Blood Pressure at rest  Blood Pressure during session    Heart Rate at rest 120 Heart Rate during session 130-145   SPO2 at rest 94%  SPO2 during session 94%     Patient education  Patient educated on role of Physical Therapy, risks of immobility, safety and plan of care, importance of positional changes for oxygen exchange,  importance of mobility while in hospital , and safety      Patient response to education:   Pt verbalized understanding Pt demonstrated skill Pt requires further education in this area   Yes Partial Yes      Treatment:  Patient practiced and was instructed/facilitated in the following treatment: Patient   Sat edge of bed 15 minutes with Moderate assist of 1 to increase dynamic sitting balance and activity tolerance. Seated challenges to correct lean to left and posterior, rolled bilateral using rails for hygiene and linen exchange      Therapeutic Exercises:  ankle pumps  x 10 reps. At end of session, patient in bed with nursing present call light and phone within reach,  all lines and tubes intact, nursing notified. Patient would benefit from continued skilled Physical Therapy to improve functional independence and quality of life. Patient's/ family goals   get stronger    Time in  347  Time out  422    Total Treatment Time  15 minutes    Evaluation time includes thorough review of current medical information, gathering information on past medical history/social history and prior level of function, completion of standardized testing/informal observation of tasks, assessment of data, and development of Plan of care and goals.      CPT codes:  Low Complexity PT evaluation (37915)  Therapeutic activities (66412)   15 minutes  1 unit(s)    Jamilah Dear, PT

## 2023-03-13 ENCOUNTER — APPOINTMENT (OUTPATIENT)
Dept: CT IMAGING | Age: 73
End: 2023-03-13
Payer: MEDICARE

## 2023-03-13 ENCOUNTER — APPOINTMENT (OUTPATIENT)
Dept: GENERAL RADIOLOGY | Age: 73
End: 2023-03-13
Payer: MEDICARE

## 2023-03-13 LAB
ANION GAP SERPL CALCULATED.3IONS-SCNC: 13 MMOL/L (ref 7–16)
BASOPHILS ABSOLUTE: 0 E9/L (ref 0–0.2)
BASOPHILS RELATIVE PERCENT: 0 % (ref 0–2)
BUN BLDV-MCNC: 46 MG/DL (ref 6–23)
CALCIUM SERPL-MCNC: 8.4 MG/DL (ref 8.6–10.2)
CHLORIDE BLD-SCNC: 99 MMOL/L (ref 98–107)
CO2: 27 MMOL/L (ref 22–29)
CREAT SERPL-MCNC: 2.7 MG/DL (ref 0.5–1)
CULTURE, RESPIRATORY: ABNORMAL
EOSINOPHILS ABSOLUTE: 0 E9/L (ref 0.05–0.5)
EOSINOPHILS RELATIVE PERCENT: 0 % (ref 0–6)
FERRITIN: 230 NG/ML
GFR SERPL CREATININE-BSD FRML MDRD: 18 ML/MIN/1.73
GLUCOSE BLD-MCNC: 160 MG/DL (ref 74–99)
HCT VFR BLD CALC: 31.6 % (ref 34–48)
HEMOGLOBIN: 8.1 G/DL (ref 11.5–15.5)
HYPOCHROMIA: ABNORMAL
IMMATURE GRANULOCYTES #: 1.2 E9/L
IMMATURE GRANULOCYTES %: 1.2 % (ref 0–5)
IRON SATURATION: 61 % (ref 15–50)
IRON: 144 MCG/DL (ref 37–145)
LYMPHOCYTES ABSOLUTE: 0 E9/L (ref 1.5–4)
LYMPHOCYTES RELATIVE PERCENT: 0 % (ref 20–42)
MAGNESIUM: 1.8 MG/DL (ref 1.6–2.6)
MCH RBC QN AUTO: 24.5 PG (ref 26–35)
MCHC RBC AUTO-ENTMCNC: 25.6 % (ref 32–34.5)
MCV RBC AUTO: 95.8 FL (ref 80–99.9)
METER GLUCOSE: 130 MG/DL (ref 74–99)
METER GLUCOSE: 169 MG/DL (ref 74–99)
METER GLUCOSE: 169 MG/DL (ref 74–99)
METER GLUCOSE: 287 MG/DL (ref 74–99)
MONOCYTES ABSOLUTE: 0 E9/L (ref 0.1–0.95)
MONOCYTES RELATIVE PERCENT: 0 % (ref 2–12)
NEUTROPHILS ABSOLUTE: 0 E9/L (ref 1.8–7.3)
NEUTROPHILS RELATIVE PERCENT: 0 % (ref 43–80)
ORGANISM: ABNORMAL
ORGANISM: ABNORMAL
OVALOCYTES: ABNORMAL
PDW BLD-RTO: 19.2 FL (ref 11.5–15)
PHOSPHORUS: 3.7 MG/DL (ref 2.5–4.5)
PLATELET # BLD: 87 E9/L (ref 130–450)
PLATELET CONFIRMATION: NORMAL
PMV BLD AUTO: 11.7 FL (ref 7–12)
POIKILOCYTES: ABNORMAL
POLYCHROMASIA: ABNORMAL
POTASSIUM SERPL-SCNC: 4.2 MMOL/L (ref 3.5–5)
RBC # BLD: 3.3 E12/L (ref 3.5–5.5)
SMEAR, RESPIRATORY: ABNORMAL
SODIUM BLD-SCNC: 139 MMOL/L (ref 132–146)
TEAR DROP CELLS: ABNORMAL
TOTAL IRON BINDING CAPACITY: 235 MCG/DL (ref 250–450)
TSH SERPL DL<=0.05 MIU/L-ACNC: 11.63 UIU/ML (ref 0.27–4.2)
WBC # BLD: 5 E9/L (ref 4.5–11.5)

## 2023-03-13 PROCEDURE — 85025 COMPLETE CBC W/AUTO DIFF WBC: CPT

## 2023-03-13 PROCEDURE — A4216 STERILE WATER/SALINE, 10 ML: HCPCS

## 2023-03-13 PROCEDURE — 2580000003 HC RX 258: Performed by: INTERNAL MEDICINE

## 2023-03-13 PROCEDURE — 2700000000 HC OXYGEN THERAPY PER DAY

## 2023-03-13 PROCEDURE — 6370000000 HC RX 637 (ALT 250 FOR IP): Performed by: INTERNAL MEDICINE

## 2023-03-13 PROCEDURE — 6370000000 HC RX 637 (ALT 250 FOR IP)

## 2023-03-13 PROCEDURE — 2580000003 HC RX 258

## 2023-03-13 PROCEDURE — 94660 CPAP INITIATION&MGMT: CPT

## 2023-03-13 PROCEDURE — 71045 X-RAY EXAM CHEST 1 VIEW: CPT

## 2023-03-13 PROCEDURE — 36592 COLLECT BLOOD FROM PICC: CPT

## 2023-03-13 PROCEDURE — C9113 INJ PANTOPRAZOLE SODIUM, VIA: HCPCS

## 2023-03-13 PROCEDURE — 90935 HEMODIALYSIS ONE EVALUATION: CPT

## 2023-03-13 PROCEDURE — 6360000002 HC RX W HCPCS: Performed by: SPECIALIST

## 2023-03-13 PROCEDURE — 6360000002 HC RX W HCPCS

## 2023-03-13 PROCEDURE — 6370000000 HC RX 637 (ALT 250 FOR IP): Performed by: NURSE PRACTITIONER

## 2023-03-13 PROCEDURE — 2000000000 HC ICU R&B

## 2023-03-13 PROCEDURE — 94640 AIRWAY INHALATION TREATMENT: CPT

## 2023-03-13 PROCEDURE — 2500000003 HC RX 250 WO HCPCS

## 2023-03-13 PROCEDURE — 97530 THERAPEUTIC ACTIVITIES: CPT | Performed by: OCCUPATIONAL THERAPIST

## 2023-03-13 PROCEDURE — 84100 ASSAY OF PHOSPHORUS: CPT

## 2023-03-13 PROCEDURE — 6360000002 HC RX W HCPCS: Performed by: INTERNAL MEDICINE

## 2023-03-13 PROCEDURE — 82728 ASSAY OF FERRITIN: CPT

## 2023-03-13 PROCEDURE — 74176 CT ABD & PELVIS W/O CONTRAST: CPT

## 2023-03-13 PROCEDURE — 99232 SBSQ HOSP IP/OBS MODERATE 35: CPT | Performed by: INTERNAL MEDICINE

## 2023-03-13 PROCEDURE — 83735 ASSAY OF MAGNESIUM: CPT

## 2023-03-13 PROCEDURE — 80048 BASIC METABOLIC PNL TOTAL CA: CPT

## 2023-03-13 PROCEDURE — 83540 ASSAY OF IRON: CPT

## 2023-03-13 PROCEDURE — 82962 GLUCOSE BLOOD TEST: CPT

## 2023-03-13 PROCEDURE — 83550 IRON BINDING TEST: CPT

## 2023-03-13 PROCEDURE — 84443 ASSAY THYROID STIM HORMONE: CPT

## 2023-03-13 PROCEDURE — 97165 OT EVAL LOW COMPLEX 30 MIN: CPT | Performed by: OCCUPATIONAL THERAPIST

## 2023-03-13 RX ORDER — LEVOTHYROXINE SODIUM 175 UG/1
175 TABLET ORAL DAILY
Status: DISCONTINUED | OUTPATIENT
Start: 2023-03-14 | End: 2023-03-16 | Stop reason: HOSPADM

## 2023-03-13 RX ORDER — PREDNISONE 1 MG/1
5 TABLET ORAL DAILY
Status: DISCONTINUED | OUTPATIENT
Start: 2023-03-13 | End: 2023-03-16 | Stop reason: HOSPADM

## 2023-03-13 RX ORDER — LORAZEPAM 0.5 MG/1
0.5 TABLET ORAL ONCE
Status: COMPLETED | OUTPATIENT
Start: 2023-03-13 | End: 2023-03-13

## 2023-03-13 RX ADMIN — ALBUTEROL SULFATE 2.5 MG: 2.5 SOLUTION RESPIRATORY (INHALATION) at 10:06

## 2023-03-13 RX ADMIN — IPRATROPIUM BROMIDE 0.5 MG: 0.5 SOLUTION RESPIRATORY (INHALATION) at 04:56

## 2023-03-13 RX ADMIN — ROPINIROLE HYDROCHLORIDE 1 MG: 1 TABLET, FILM COATED ORAL at 14:20

## 2023-03-13 RX ADMIN — ROPINIROLE HYDROCHLORIDE 1 MG: 1 TABLET, FILM COATED ORAL at 09:25

## 2023-03-13 RX ADMIN — IPRATROPIUM BROMIDE 0.5 MG: 0.5 SOLUTION RESPIRATORY (INHALATION) at 10:06

## 2023-03-13 RX ADMIN — BUDESONIDE 500 MCG: 0.5 SUSPENSION RESPIRATORY (INHALATION) at 18:18

## 2023-03-13 RX ADMIN — Medication 10 ML: at 22:11

## 2023-03-13 RX ADMIN — INSULIN GLARGINE 8 UNITS: 100 INJECTION, SOLUTION SUBCUTANEOUS at 09:28

## 2023-03-13 RX ADMIN — Medication 10 ML: at 09:36

## 2023-03-13 RX ADMIN — LEVOTHYROXINE SODIUM ANHYDROUS 100 MCG: 100 INJECTION, POWDER, LYOPHILIZED, FOR SOLUTION INTRAVENOUS at 06:11

## 2023-03-13 RX ADMIN — Medication 40 MG: at 17:37

## 2023-03-13 RX ADMIN — CARBIDOPA AND LEVODOPA 2 TABLET: 25; 100 TABLET, EXTENDED RELEASE ORAL at 14:20

## 2023-03-13 RX ADMIN — METOPROLOL TARTRATE 25 MG: 25 TABLET, FILM COATED ORAL at 22:08

## 2023-03-13 RX ADMIN — Medication 40 MG: at 06:44

## 2023-03-13 RX ADMIN — ALBUTEROL SULFATE 2.5 MG: 2.5 SOLUTION RESPIRATORY (INHALATION) at 04:56

## 2023-03-13 RX ADMIN — CARBIDOPA AND LEVODOPA 2 TABLET: 25; 100 TABLET, EXTENDED RELEASE ORAL at 22:07

## 2023-03-13 RX ADMIN — IPRATROPIUM BROMIDE 0.5 MG: 0.5 SOLUTION RESPIRATORY (INHALATION) at 18:18

## 2023-03-13 RX ADMIN — CEFEPIME 1000 MG: 1 INJECTION, POWDER, FOR SOLUTION INTRAMUSCULAR; INTRAVENOUS at 03:34

## 2023-03-13 RX ADMIN — LINEZOLID 600 MG: 600 TABLET, FILM COATED ORAL at 09:25

## 2023-03-13 RX ADMIN — ROPINIROLE HYDROCHLORIDE 1 MG: 1 TABLET, FILM COATED ORAL at 22:08

## 2023-03-13 RX ADMIN — SERTRALINE 50 MG: 50 TABLET, FILM COATED ORAL at 22:08

## 2023-03-13 RX ADMIN — METOPROLOL TARTRATE 25 MG: 25 TABLET, FILM COATED ORAL at 09:25

## 2023-03-13 RX ADMIN — ACETAMINOPHEN 650 MG: 325 TABLET ORAL at 10:27

## 2023-03-13 RX ADMIN — CEFEPIME 1000 MG: 1 INJECTION, POWDER, FOR SOLUTION INTRAMUSCULAR; INTRAVENOUS at 17:34

## 2023-03-13 RX ADMIN — BUDESONIDE 500 MCG: 0.5 SUSPENSION RESPIRATORY (INHALATION) at 04:56

## 2023-03-13 RX ADMIN — LORAZEPAM 0.5 MG: 0.5 TABLET ORAL at 23:11

## 2023-03-13 RX ADMIN — INSULIN LISPRO 4 UNITS: 100 INJECTION, SOLUTION INTRAVENOUS; SUBCUTANEOUS at 09:28

## 2023-03-13 RX ADMIN — IPRATROPIUM BROMIDE 0.5 MG: 0.5 SOLUTION RESPIRATORY (INHALATION) at 13:46

## 2023-03-13 RX ADMIN — FLUCONAZOLE IN SODIUM CHLORIDE 200 MG: 2 INJECTION, SOLUTION INTRAVENOUS at 09:36

## 2023-03-13 RX ADMIN — HYDROCORTISONE SODIUM SUCCINATE 50 MG: 100 INJECTION, POWDER, FOR SOLUTION INTRAMUSCULAR; INTRAVENOUS at 09:26

## 2023-03-13 RX ADMIN — ALBUTEROL SULFATE 2.5 MG: 2.5 SOLUTION RESPIRATORY (INHALATION) at 18:18

## 2023-03-13 RX ADMIN — QUETIAPINE FUMARATE 25 MG: 25 TABLET ORAL at 22:08

## 2023-03-13 RX ADMIN — PREDNISONE 5 MG: 5 TABLET ORAL at 14:20

## 2023-03-13 RX ADMIN — INSULIN GLARGINE 8 UNITS: 100 INJECTION, SOLUTION SUBCUTANEOUS at 21:59

## 2023-03-13 RX ADMIN — ALBUTEROL SULFATE 2.5 MG: 2.5 SOLUTION RESPIRATORY (INHALATION) at 13:44

## 2023-03-13 RX ADMIN — CARBIDOPA AND LEVODOPA 2 TABLET: 25; 100 TABLET, EXTENDED RELEASE ORAL at 09:37

## 2023-03-13 RX ADMIN — HYDROCORTISONE SODIUM SUCCINATE 50 MG: 100 INJECTION, POWDER, FOR SOLUTION INTRAMUSCULAR; INTRAVENOUS at 00:58

## 2023-03-13 RX ADMIN — EPOETIN ALFA-EPBX 3000 UNITS: 3000 INJECTION, SOLUTION INTRAVENOUS; SUBCUTANEOUS at 09:37

## 2023-03-13 ASSESSMENT — PAIN SCALES - GENERAL
PAINLEVEL_OUTOF10: 0
PAINLEVEL_OUTOF10: 2

## 2023-03-13 NOTE — PROGRESS NOTES
visited with patient.  offered a listening presence, nurtured hope, and prayer. Patients stated she was looking forward to going to nursing home. Patient expressed gratitude for visit and prayer.

## 2023-03-13 NOTE — PROGRESS NOTES
Comprehensive Nutrition Assessment    Type and Reason for Visit:  Reassess    Nutrition Recommendations/Plan:   Continue current diet order   Recommend ONS Ensure HP BID to optimize nutrition status       Malnutrition Assessment:  Malnutrition Status: At risk for malnutrition (Comment) (03/11/23 6753)    Context:  Chronic Illness     Findings of the 6 clinical characteristics of malnutrition:  Energy Intake:  Unable to assess (intubated)  Weight Loss:  Unable to assess (d/t fluctuation in wt hx per EMR)     Body Fat Loss:  No significant body fat loss     Muscle Mass Loss:  No significant muscle mass loss    Fluid Accumulation:  Unable to assess     Strength:  Not Performed    Nutrition Assessment:    Pt remains high nutritional risk d/t admit w/ myxedema coma, acute respiratory failure now extubated, pleural effusions and possible HCAP. PMHx of ESRD on HD, CHF, Afib, CAD, DM, parkinson's disease, SERENA. Now on PO diet, will add ONS BID and continue to monitor    Nutrition Related Findings:    abd soft, nontender, nondistended, obese, A&Ox4, Active BS x4, +1 non-pitting edema, I/O's -1.1L since admit Wound Type: Deep Tissue Injury (R groin)       Current Nutrition Intake & Therapies:    Average Meal Intake: 26-50%  Average Supplements Intake: None Ordered  ADULT DIET; Dysphagia - Soft and Bite Sized; 4 carb choices (60 gm/meal); No Drinking Straws  ADULT ORAL NUTRITION SUPPLEMENT; Breakfast, Lunch; Low Calorie/High Protein Oral Supplement    Anthropometric Measures:  Height: 5' (152.4 cm)  Ideal Body Weight (IBW): 100 lbs (45 kg)    Admission Body Weight: 215 lb 6 oz (97.7 kg) (3/10 bed scale, first measured)  Current Body Weight: 207 lb 14 oz (94.3 kg) (3/13 bed scale), 207.9 % IBW.  Weight Source: Bed Scale  Current BMI (kg/m2): 40.6  Usual Body Weight:  (JOHN d/t wt fluctuations per EMR likely r/t fluid shifts from CHF/ESRD)  Weight Adjustment For: No Adjustment  BMI Categories: Obese Class 3 (BMI 40.0 or greater)    Estimated Daily Nutrient Needs:  Energy Requirements Based On: Formula  Weight Used for Energy Requirements: Current  Energy (kcal/day): 1500-1600kcal/day (PS10)  Weight Used for Protein Requirements: Ideal  Protein (g/day): 115-125g/day (2.5-2.7g/kg IBW)  Method Used for Fluid Requirements: 1 ml/kcal  Fluid (ml/day): per critical care    Nutrition Diagnosis:   Inadequate oral intake related to impaired respiratory function as evidenced by intubation, nutrition support - enteral nutrition, NPO or clear liquid status due to medical condition    Nutrition Interventions:   Food and/or Nutrient Delivery: Continue Current Diet, Start Oral Nutrition Supplement (Recommend ONS Ensure HP BID to optimize nutrition status)  Nutrition Education/Counseling: No recommendation at this time  Coordination of Nutrition Care: Continue to monitor while inpatient    Goals:  Goals: PO intake 75% or greater, by next RD assessment    Nutrition Monitoring and Evaluation:   Behavioral-Environmental Outcomes: None Identified  Food/Nutrient Intake Outcomes: Food and Nutrient Intake, Supplement Intake  Physical Signs/Symptoms Outcomes: Biochemical Data, Chewing or Swallowing, GI Status, Fluid Status or Edema, Nutrition Focused Physical Findings, Skin, Weight    Discharge Planning:     Too soon to determine     Linda BRAULIO Anders  Contact:

## 2023-03-13 NOTE — FLOWSHEET NOTE
03/13/23 1735   Vital Signs   /67   Temp 97.9 °F (36.6 °C)   Heart Rate (!) 155   Resp 17   SpO2 (!) 64 %   Weight 212 lb 15.4 oz (96.6 kg)   Weight Method Bed scale   Percent Weight Change -2.52   Post-Hemodialysis Assessment   Post-Treatment Procedures Blood returned;Catheter capped, clamped and heparinized x 2 ports   Machine Disinfection Process Acid/Vinegar Clean;Heat Disinfect; Exterior Machine Disinfection   Rinseback Volume (ml) 300 ml   Blood Volume Processed (Liters) 67.4 l/min   Dialyzer Clearance Lightly streaked   Duration of Treatment (minutes) 180 minutes   Heparin Amount Administered During Treatment (mL) 0 mL   Hemodialysis Intake (ml) 600 ml   Hemodialysis Output (ml) 3300 ml   NET Removed (ml) 2700   Tolerated Treatment Good   Patient Response to Treatment treatment complete, patient tolerated well   Bilateral Breath Sounds Diminished   RLE Edema +1   LLE Edema +1   Physician Notified No   Time Off 2034   Patient Disposition Remain in ICU/ED

## 2023-03-13 NOTE — PROGRESS NOTES
Department of Internal Medicine  General Internal Medicine  Attending Progress Note  Chief Complaint   Patient presents with    Hypotension     SUBJECTIVE:    Reports that she is doing well. Denied fever and chills. No chest pain. No nausea or vomiting.     OBJECTIVE      Medications    Current Facility-Administered Medications: pantoprazole (PROTONIX) 40 mg in sodium chloride (PF) 0.9 % 10 mL injection, 40 mg, IntraVENous, Q12H  [START ON 3/14/2023] levothyroxine (SYNTHROID) tablet 175 mcg, 175 mcg, Oral, Daily  0.9 % sodium chloride infusion, , IntraVENous, PRN  phenylephrine (JIMENA-SYNEPHRINE) 50 mg in sodium chloride 0.9 % 250 mL infusion,  mcg/min, IntraVENous, Continuous  epoetin josé luis-epbx (RETACRIT) injection 3,000 Units, 3,000 Units, SubCUTAneous, Once per day on Mon Wed Fri  hydrocortisone sodium succinate PF (SOLU-CORTEF) injection 50 mg, 50 mg, IntraVENous, Q8H  sertraline (ZOLOFT) tablet 50 mg, 50 mg, Oral, Nightly  rOPINIRole (REQUIP) tablet 1 mg, 1 mg, Oral, TID  carbidopa-levodopa (SINEMET CR)  MG per extended release tablet 2 tablet, 2 tablet, Oral, TID  metoprolol tartrate (LOPRESSOR) tablet 25 mg, 25 mg, Oral, BID  fluconazole (DIFLUCAN) in 0.9 % sodium chloride IVPB 200 mg, 200 mg, IntraVENous, Q24H  glucose chewable tablet 16 g, 4 tablet, Oral, PRN  dextrose bolus 10% 125 mL, 125 mL, IntraVENous, PRN **OR** dextrose bolus 10% 250 mL, 250 mL, IntraVENous, PRN  glucagon (rDNA) injection 1 mg, 1 mg, SubCUTAneous, PRN  dextrose 10 % infusion, , IntraVENous, Continuous PRN  apixaban (ELIQUIS) tablet 5 mg, 5 mg, Oral, BID  insulin lispro (HUMALOG) injection vial 0-4 Units, 0-4 Units, SubCUTAneous, Nightly  insulin glargine (LANTUS) injection vial 8 Units, 8 Units, SubCUTAneous, BID  insulin lispro (HUMALOG) injection vial 0-8 Units, 0-8 Units, SubCUTAneous, TID WC  QUEtiapine (SEROQUEL) tablet 25 mg, 25 mg, Oral, Nightly  sodium chloride flush 0.9 % injection 10 mL, 10 mL, IntraVENous, 2 times per day  sodium chloride flush 0.9 % injection 10 mL, 10 mL, IntraVENous, PRN  0.9 % sodium chloride infusion, , IntraVENous, PRN  promethazine (PHENERGAN) tablet 12.5 mg, 12.5 mg, Oral, Q6H PRN **OR** ondansetron (ZOFRAN) injection 4 mg, 4 mg, IntraVENous, Q6H PRN  polyethylene glycol (GLYCOLAX) packet 17 g, 17 g, Oral, Daily PRN  acetaminophen (TYLENOL) tablet 650 mg, 650 mg, Oral, Q6H PRN **OR** acetaminophen (TYLENOL) suppository 650 mg, 650 mg, Rectal, Q6H PRN  DOPamine (INTROPIN) 800 mg in dextrose 5 % 250 mL infusion, 1-20 mcg/kg/min, IntraVENous, Continuous  budesonide (PULMICORT) nebulizer suspension 500 mcg, 0.5 mg, Nebulization, BID  albuterol (PROVENTIL) nebulizer solution 2.5 mg, 2.5 mg, Nebulization, Q4H WA **AND** ipratropium (ATROVENT) 0.02 % nebulizer solution 0.5 mg, 0.5 mg, Nebulization, Q4H WA  cefepime (MAXIPIME) 1,000 mg in sodium chloride 0.9 % 50 mL IVPB ([id number]), 1,000 mg, IntraVENous, Q12H  midodrine (PROAMATINE) tablet 10 mg, 10 mg, Per NG tube, Q MWF  Physical    VITALS:  BP (!) 156/85   Pulse 82   Temp 97.6 °F (36.4 °C) (Bladder)   Resp 14   Ht 5' (1.524 m)   Wt 207 lb 14.4 oz (94.3 kg)   SpO2 94%   BMI 40.60 kg/m²   CONSTITUTIONAL:  awake, cooperative, and no apparent distress  EYES:  extra-ocular muscles intact  ENT:  normocepalic, without obvious abnormality  NECK:  supple, symmetrical, trachea midline  LUNGS:  no increased work of breathing, no retractions, and clear to auscultation via anterior auscultation  CARDIOVASCULAR:  normal apical pulses and normal S1 and S2  ABDOMEN:  normal bowel sounds, non-distended, and non-tender  MUSCULOSKELETAL:  full range of motion noted  NEUROLOGIC:  Mental Status Exam:  Level of Alertness:   awake  Orientation:   person, place, time  SKIN:  no bruising or bleeding  anasarca  Data    CBC:   Lab Results   Component Value Date/Time    WBC 5.0 03/13/2023 04:08 AM    RBC 3.30 03/13/2023 04:08 AM    HGB 8.1 03/13/2023 04:08 AM    HCT 31.6 03/13/2023 04:08 AM    MCV 95.8 03/13/2023 04:08 AM    MCH 24.5 03/13/2023 04:08 AM    MCHC 25.6 03/13/2023 04:08 AM    RDW 19.2 03/13/2023 04:08 AM    PLT 87 03/13/2023 04:08 AM    MPV 11.7 03/13/2023 04:08 AM     BMP:    Lab Results   Component Value Date/Time     03/13/2023 04:08 AM    K 4.2 03/13/2023 04:08 AM    K 3.6 02/20/2023 01:42 PM    CL 99 03/13/2023 04:08 AM    CO2 27 03/13/2023 04:08 AM    BUN 46 03/13/2023 04:08 AM    LABALBU 4.1 03/11/2023 04:21 AM    CREATININE 2.7 03/13/2023 04:08 AM    CALCIUM 8.4 03/13/2023 04:08 AM    GFRAA >60 10/16/2022 09:20 AM    LABGLOM 18 03/13/2023 04:08 AM    GLUCOSE 160 03/13/2023 04:08 AM       ASSESSMENT AND PLAN    Part of History of present illness from History and Physical:  presented with SOB, hypotension  for last few days prior to arrival to the hospital. SOB is associated with some cough, nonproductive but no fever or chills reported. Initially SOB was mild, intermittent but gradually getting more persistent. Started gradually. Exacerbated by general activity or exertion. Relieved only partially by rest. In ED patient was found to be hypotensive with Afib RVR and respiratory failure and was placed on BIPAP    Update: Admitted with altered mentation. 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 She was intubated, started on pressors and Solu-Cortef. She was extubated on 3/11. Gradual clinical improvement has been noted since admission. Patient is anemic and no source of bleeding has been found yet. 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 Continue to monitor  Tsh 3/12: 26.9; 3/13 11.6  Acute on chronic Anemia: CKD related. Transfused. stable  ESRD on HD: per Nephro  Atrial Fibrillation: poorly controlled this morning. Resume home medications  Thrombocytopenia: related to possible heparin used.  Added as allergy and started on eliquis on 3/11  DM type 2: insulin as ordered  UTI: positive for Enterococcus Cloacae: abx per ID: maxipime and diflucan  Acute on chronic respiratory failure with hypercapnia: continue supplemental oxygen and bipap per pulmonary  Pleural Effusion: overall stable. Thoracentesis was last admission. Congestive heart failure: stable. Continue current care. DVT prophylaxis: Lovenox  Full code.   Disposition: facility when ready

## 2023-03-13 NOTE — CARE COORDINATION
3/13/2023 ICU, 2lnc versus Bipap. Off pressors. pt from [address] _NO Return per sister Pascale Dowd who wants her mom to go somewhere where they have respiratory therapists available to trouble shoot bipap she asked for [facility] [place], referral made. Requested update from [facility], today. PRECERT Needed, signed [place] ,Rapid covid test, HENS can be retreived from [address]. Pt is chronic hemodialysis- Susan Rosenbaum-  am chair time- per Cheko Carranza at dialysis center. Cm to follow.  Electronically signed by Elio Browne RN-BC on 3/13/2023 at 12:21 PM

## 2023-03-13 NOTE — PROGRESS NOTES
[facility] MD Chase Luna, MD Elsa Avila, BRADLEY Devlin, NUNU Horowitz, BRADLEY  Progress Note    3/13/2023    SUBJECTIVE:   3/11: Seen in the ICU. Mechanically ventilated and sedated. FiO2 35 % PEEP 5. She opens eyes to voice, does not follow commands. Tolerated HD yesterday without complications. TSH improving. On dopamine infusion. 3/12: Seen in the ICU. Extubated yesterday, now on nasal canula. No acute complaints. Confused. Denies chest pain, dyspnea, or palpitations. BP low this morning with elevated heart rate. Hgb decreased from 10.2-->7.2. receiving 1 unit PRBC. Denies hematochezia. On amrit. 3/13: Seen in the ICU. Sitting up eating breakfast. Blood pressure has improved, off pressors. Mentation improving. 300 cc urine output yesterday. She is on nasal canula. CT abdomen negative for retroperitoneal hematoma. PROBLEM LIST:    Principal Problem:    Myxedema coma ([facility] [address]  Resolved Problems:    * No resolved hospital problems. *         DIET:    ADULT DIET; Dysphagia - Soft and Bite Sized; 4 carb choices (60 gm/meal);  No Drinking Straws     MEDS (scheduled):    pantoprazole (PROTONIX) 40 mg injection  40 mg IntraVENous Q12H    epoetin josé luis-epbx  3,000 Units SubCUTAneous Once per day on Mon Wed Fri    hydrocortisone sodium succinate PF  50 mg IntraVENous Q8H    sertraline  50 mg Oral Nightly    rOPINIRole  1 mg Oral TID    carbidopa-levodopa  2 tablet Oral TID    metoprolol tartrate  25 mg Oral BID    fluconazole  200 mg IntraVENous Q24H    apixaban  5 mg Oral BID    insulin lispro  0-4 Units SubCUTAneous Nightly    insulin glargine  8 Units SubCUTAneous BID    insulin lispro  0-8 Units SubCUTAneous TID     QUEtiapine  25 mg Oral Nightly    levothyroxine  100 mcg IntraVENous Daily    sodium chloride flush  10 mL IntraVENous 2 times per day    budesonide  0.5 mg Nebulization BID    albuterol  2.5 mg Nebulization Q4H WA    And ipratropium  0.5 mg Nebulization Q4H WA    cefepime  1,000 mg IntraVENous Q12H    linezolid  600 mg Oral 2 times per day    midodrine  10 mg Per NG tube Q MWF       MEDS (infusions):   sodium chloride      phenylephrine (JIMENA-SYNEPHRINE) 50 mg/250 mL infusion 10 mcg/min (03/12/23 1508)    dextrose      sodium chloride Stopped (03/11/23 0921)    DOPamine Stopped (03/11/23 1743)       MEDS (prn):  sodium chloride, glucose, dextrose bolus **OR** dextrose bolus, glucagon (rDNA), dextrose, sodium chloride flush, sodium chloride, promethazine **OR** ondansetron, polyethylene glycol, acetaminophen **OR** acetaminophen    PHYSICAL EXAM:     Patient Vitals for the past 24 hrs:   BP Temp Temp src Pulse Resp SpO2 Weight   03/13/23 0700 (!) 150/77 -- -- 80 21 99 % --   03/13/23 0600 135/87 -- -- 90 14 98 % --   03/13/23 0500 135/78 -- -- 81 18 99 % --   03/13/23 0400 129/80 97.9 °F (36.6 °C) Bladder 72 10 99 % --   03/13/23 0330 122/75 -- -- 65 16 99 % --   03/13/23 0300 123/77 -- -- 77 19 99 % --   03/13/23 0230 126/72 -- -- 74 20 99 % --   03/13/23 0200 123/76 -- -- 81 20 99 % --   03/13/23 0146 -- -- -- -- 18 -- --   03/13/23 0130 134/79 -- -- 79 19 99 % --   03/13/23 0100 (!) 140/86 -- -- 85 12 99 % 207 lb 14.4 oz (94.3 kg)   03/13/23 0030 119/74 -- -- 84 20 99 % --   03/13/23 0000 113/72 98.1 °F (36.7 °C) Bladder 87 13 99 % --   03/12/23 2330 119/77 -- -- 99 (!) 9 98 % --   03/12/23 2300 (!) 140/83 -- -- (!) 117 13 97 % --   03/12/23 2238 -- -- -- -- 20 -- --   03/12/23 2230 (!) 143/98 -- -- (!) 126 20 97 % --   03/12/23 2201 (!) 145/76 -- -- (!) 129 -- -- --   03/12/23 2200 (!) 145/76 -- -- (!) 117 12 98 % --   03/12/23 2130 128/69 -- -- (!) 113 11 97 % --   03/12/23 2100 125/72 -- -- (!) 116 12 97 % --   03/12/23 2030 116/78 -- -- (!) 126 13 97 % --   03/12/23 2000 (!) 146/89 98.4 °F (36.9 °C) Bladder (!) 133 18 95 % --   03/12/23 1900 -- -- -- (!) 127 17 97 % --   03/12/23 1800 107/64 -- -- (!) 122 12 95 % -- 03/12/23 1700 101/72 -- -- (!) 143 14 95 % --   03/12/23 1610 -- -- -- (!) 130 -- -- --   03/12/23 1600 98/69 99.1 °F (37.3 °C) Bladder (!) 132 20 94 % --   03/12/23 1550 -- -- -- (!) 120 -- -- --   03/12/23 1500 122/86 -- -- (!) 109 17 97 % --   03/12/23 1400 96/60 -- -- (!) 119 16 97 % --   03/12/23 1300 (!) 87/50 -- -- (!) 141 17 100 % --   03/12/23 1200 (!) 102/55 99.1 °F (37.3 °C) Bladder (!) 135 12 94 % --   03/12/23 1100 92/64 99.2 °F (37.3 °C) Bladder (!) 135 12 92 % --     @      Intake/Output Summary (Last 24 hours) at 3/13/2023 1001  Last data filed at 3/13/2023 4754  Gross per 24 hour   Intake 633.69 ml   Output 300 ml   Net 333.69 ml           Wt Readings from Last 3 Encounters:   03/13/23 207 lb 14.4 oz (94.3 kg)   02/24/23 214 lb 15.2 oz (97.5 kg)   01/16/23 259 lb (117.5 kg)       Constitutional: in no acute distress, confused   HEENT: NC/AT, EOMI, sclera and conjunctiva are clear and anicteric, mucus membranes moist.  Neck: Trachea midline, no JVD. RIJ tesio  Cardiovascular: S1, S2 regular rhythm, no murmur,or rub  Respiratory:  CTAB.  No crackles, no wheeze  Gastrointestinal:  Soft, nontender, distended, abdomen and abdominal panus swelling has improved (+1) NABS  Ext: trace dependent edema with skin wrinkling, feet warm  Skin: dry, no rash  Neuro: slight confusion       DATA:    Recent Labs     03/11/23  0421 03/12/23  0434 03/12/23  1149 03/13/23  0408   WBC 9.9 4.0*  --  5.0   HGB 10.2* 7.2* 9.2* 8.1*   HCT 35.9 25.7* 32.3* 31.6*   MCV 87.8 89.5  --  95.8    108*  --  87*       Recent Labs     03/11/23  0421 03/12/23  0434 03/13/23  0408    137 139   K 3.7 3.0* 4.2   CL 93* 97* 99   CO2 27 29 27   MG 1.9 1.7 1.8   PHOS 2.0* 2.9 3.7   BUN 18 33* 46*   CREATININE 1.9* 2.5* 2.7*   ALT <5  --   --    AST 7  --   --    BILITOT 1.0  --   --    ALKPHOS 94  --   --          Lab Results   Component Value Date    LABPROT 0.5 (H) 01/18/2023    LABPROT 0.5 01/18/2023          Assessment  Acute on chronic kidney disease requiring initiation of hemodialysis during a recent hospitalization. Hemodialysis on Mondays, Wednesdays, and Fridays. Chronic kidney disease stage III, baseline creatinine 1.4-1.7 mg/dL secondary to diabetic nephropathy and renal microvascular atherosclerotic disease.    Anemia due to CKD  Myxedema Coma   Acute hypercapnic respiratory failure      Urine output-300 cc  K 4.2   Hgb 8.1      Plan  Continue IHD support for solute and volume clearance on MWF  Dialysis today  Ultrafiltration as warranted  Iron panel, normal   Continue Retacrit   10 mg midodrine on dialysis days if hypotensive   Fu blood cutlures   Follow labs  Monitor I&O  Continue intensive supportive care        Electronically signed by RASHARD Camargo CNP on 3/13/2023 at 10:01 AM

## 2023-03-13 NOTE — PROGRESS NOTES
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 Acute on chronic congestive heart failure, unspecified heart failure type ([address]    3. Chronic renal failure, stage 4 (severe) (HCC)    4. Elevated troponin    5. Hypotension, unspecified hypotension type    6. Acute respiratory failure with hypercapnia (HCC)    7. Acute on chronic respiratory failure with hypercapnia (HCC)    8.  Postprocedural pneumothorax         Surgery: none      Pertinent Medical History:        Past Medical History:   Diagnosis Date    A-fib ([address]     Acute on chronic congestive heart failure (HCC)     Anxiety     Asthma     CAD (coronary artery disease) 2016    Cancer ([address]  breast ca 2006    right lumpectomy    Chronic kidney disease     nephrolithiasis    COPD exacerbation ([address] 10/12/2022    Depression     Diabetes mellitus ([address]     H/O mammogram     Hemodialysis patient ([address]     Hx MRSA infection     toe infection 2012    Hyperlipidemia     Hypertension     Lateral epicondylitis     Morbid obesity ([address]     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 Federico Fernández  01/11/2022    Dr. Dina Reveles  7/29/15    CT GUIDED CHEST TUBE  7/8/2022    CT GUIDED CHEST TUBE 7/8/2022 Dary Farrar MD [facility]    ENDOSCOPY, COLON, DIAGNOSTIC  7/19/15    GALLBLADDER SURGERY      IR PERC CATH PLEURAL DRAIN W/IMAG  2/14/2023    IR PERC CATH PLEURAL DRAIN W/IMAG 2/14/2023 [facility]    IR REMOVAL OF TUNNELED PLEURAL CATH W CUFF  2/23/2023    IR REMOVAL OF TUNNELED PLEURAL CATH W CUFF 2/23/2023 [facility]    LUMBAR LAMINECTOMY      TOE AMPUTATION      TONSILLECTOMY      UPPER GASTROINTESTINAL ENDOSCOPY      UPPER GASTROINTESTINAL ENDOSCOPY N/A 7/19/2022    EGD ESOPHAGOGASTRODUODENOSCOPY performed by Tiffanie Bates MD at [facility] ENDOSCOPY       Precautions:  Fall Risk, VRE with contact, genao, 2L    Recommended placement: subaucte    Assessment of current deficits     [x] Functional mobility  [x]ADLs  [x] Strength               [x]Cognition     [x] Functional transfers   [x] IADLs         [x] Safety Awareness   [x]Endurance     [] Fine Coordination              [x] Balance      [] Vision/perception   []Sensation      [x]Gross Motor Coordination  [] ROM  [] Delirium                   [] Motor Control     OT PLAN OF CARE   OT POC based on physician orders, patient diagnosis and results of clinical assessment    Frequency/Duration 1-3 days/wk for 2 weeks PRN   Specific OT Treatment Interventions to include:   * Instruction/training on adapted ADL techniques and AE recommendations to increase functional independence within precautions       * Training on energy conservation strategies, correct breathing pattern and techniques to improve independence/tolerance for self-care routine  * Functional transfer/mobility training/DME recommendations for increased independence, safety, and fall prevention  * Patient/Family education to increase follow through with safety techniques and functional independence  * Recommendation of environmental modifications for increased safety with functional transfers/mobility and ADLs  * Cognitive retraining/development of therapeutic activities to improve problem solving, judgement, memory, and attention for increased safety/participation in ADL/IADL tasks  * Therapeutic exercise to improve motor endurance, ROM, and functional strength for ADLs/functional transfers  * Therapeutic activities to facilitate/challenge dynamic balance, stand tolerance for increased safety and independence with ADLs  * Therapeutic activities to facilitate gross/fine motor skills for increased independence with ADLs  * Neuro-muscular re-education: facilitation of righting/equilibrium reactions, midline orientation, scapular stability/mobility, normalization of muscle tone, and facilitation of volitional active controled movement  * Positioning to improve skin integrity, interaction with environment and functional independence     Recommended Adaptive Equipment/DME:  TBD      Home Living: Pt is a long term resident of a SNF and [facility]. Bathroom setup: roll in shower chair; grab bars by the toilet and 3:1 over top for safety. DME owned: wc and walker      Prior Level of Function: assist with ADLs , dep with IADLs; ambulated with wc mostly, but transferring with ww and assist   Driving: no   Occupation: none   Enjoys: reading, socializing, phone-games    Pain Level: none  Cognition: A&O: 2/4 confused to situation and time; Follows 2 step directions; learned dependence and pt very self limiting with session, difficult to remain on task, often asking for assistance prior to trying to figure out on her own. Word finding deficits noted.     Memory:  fair-   Sequencing:  fair-   Problem solving:  poor+   Judgement/safety:  Poor+    AM-PAC Daily Activity - Inpatient   How much help is needed for putting on and taking off regular lower body clothing?: Total  How much help is needed for bathing (which includes washing, rinsing, drying)?: Total  How much help is needed for toileting (which includes using toilet, bedpan, or urinal)?: Total  How much help is needed for putting on and taking off regular upper body clothing?: Total  How much help is needed for taking care of personal grooming?: A Lot  How much help for eating meals?: A Little  AM-[facility] Inpatient Daily Activity Raw Score: 9  AM-PAC Inpatient ADL T-Scale Score : 25.33  ADL Inpatient CMS 0-100% Score: 79.59  ADL Inpatient CMS G-Code Modifier : CL    Functional Assessment:     Initial Eval Status  Date: 3/13/2023   Treatment Status  Date: STGs = LTGs  Time frame: 10-14 days   Feeding Minimal Assist ; Extended time, repositioning for safety/function and education on body mechanics with breakfast from upright sitting in supine. Pt required set up of food items and prep of items on tray. All items placed within reach for increased indep and decreased spillage. Pt was able to use utensils and used right UE as dominate hand with self feeding. Good intake and 25% spillage overall. No coughing for choking hazard was present. Set up   Grooming Moderate Assist with face/hand wash and hair comb. Pt reporting that her daughter could assist her with it later. Educated her on completing tasks at highest level of indep. Minimal Assist    UB Dressing Dependent with gown management from supine level due to line management  Moderate Assist    LB Dressing Dependent with socks from supine  Not Appropriate-PLOF      Bathing Maximal Assist x 2 from supine level due to pt confusion and resistance to rolling  Maximal Assist    Toileting Dependent with toileting with A x 2 from supine and genao management  Dependent    Bed Mobility  Pt with poor+ positioning on arrival. Yelling out, \"I don't sit up! \" When just attempting to make straight in bed. Max A x 2 for positioning and rolling.    Supine to sit: Maximal Assist   Sit to supine: Maximal Assist Functional Transfers  NT due to pt overall debility, decreased activity tolerance, balance deficits, safety and fall risk. Not Appropriate-PLOF     Functional Mobility  NT due to pt overall debility, decreased activity tolerance, balance deficits, safety and fall risk. Not appropriate at time of eval. To re-assess at a later time if appropriate. Balance Sitting:     Static: Refused    Dynamic:refused  Standing: refused  Sitting:     Static:  fair    Dynamic:fair-   Activity Tolerance Vitals with activity: 2L 180/91  97%; 161/87 133 HR 96%; Fair- tolerance and poor+ motivation for therapy session; Declining to sit EOB  Increase sitting tolerance for >10min with stable vital signs for carry over into toileting, functional tranfers and indep in ADLs   Visual/  Perceptual Glasses: Present; WFL    Reports change in vision since admission: No     NA     Hand Dominance  [x] Right   [] Left     AROM (PROM) Strength Additional Info:  Goal:   RUE  WFL 4-/5 good  and fair FMC/dexterity noted during ADL tasks-tremor  Opposition [x] Intact [] Impaired  Finger to nose [x] Intact [] Impaired 4+/5MMT generally for carry over into self care, functional transfers and functional mobility with AD. LUE WFL 4-/5 good  and fair FMC/dexterity noted during ADL tasks-tremor  Opposition [x] Intact [] Impaired  Finger to nose [x] Intact [] Impaired 5/5MMT generally for carry over into self care, functional transfers and functional mobility with AD. Hearing: WFL   Sensation:  No c/o numbness or tingling   Tone: WFL   Edema: funmi LE    Comments: Upon arrival patient supine in bed. Pt required dep A for most UB ADLs and dep A LB ADLs tasks. The biggest barriers reflect that of functional transfers, functional mobility, UB/LB ADLs, cognition, activity tolerance, balance, safety and strengthening. At end of session, patient supine with call light and phone within reach, all lines and tubes intact.   Overall patient demonstrated decreased independence and safety during completion of ADL/functional transfer/mobility tasks compared to PLOF. Nursing updated on pt position and status following OT eval. Pt would benefit from continued skilled OT to increase safety and independence with completion of ADL/IADL tasks for functional independence and quality of life. Treatment: OT treatment provided this date includes:  Instruction, education and training on safe facilitation and adapted techniques for completion of ADLs. These include neuromuscular reeducation to facilitate balance/righting reactions, proper positioning/alignment to improve interaction with environment and overall function and on adapted techniques/work simplification for completion of ADLs. Education provided on hand/feet placement with bed rails and body mechanics for fall prevention. Cues for energy conservation and safety for in the home at DC, including modifications and DME. Extended time to complete all tasks, including skilled monitoring of patient's response during treatment session and vital signs. Prior to and at the end of session, environmental modifications / line management completed for patients safety and efficiency of treatment session. See above for further details. Rehab Potential: Good for established goals     Patient / Family Goal: O2 management      Patient and/or family were instructed on functional diagnosis, prognosis/goals and OT plan of care. Demonstrated fair understanding. Eval Complexity: Low  History: Brief review of medical records and additional review of physical, cognitive, or psychosocial history related to current functional performance  Exam: 3+ performance deficits  Assistance/Modification: Mod assistance or modifications required to perform tasks. May have comorbidities that affect occupational performance.     Time In: 1247  Time Out: 0922  Total Treatment Time: 12    Min Units   OT Eval Low 97165  x  1   OT Eval Medium 38605      OT Eval High [id number]      OT Re-Eval [id number]       Therapeutic Ex [id number]       Therapeutic Activities 98595  12 1    ADL/Self Care 22618       Orthotic Management 29081       Manual [id number]     Neuro Re-Ed 74918       Non-Billable Time          Evaluation Time additionally includes thorough review of current medical information, gathering information on past medical history/social history and prior level of function, interpretation of standardized testing/informal observation of tasks, assessment of data and development of plan of care and goals.             Aubrie Corbett OTR/L; [id number]

## 2023-03-13 NOTE — PLAN OF CARE
Problem: Discharge Planning  Goal: Discharge to home or other facility with appropriate resources  Outcome: Progressing     Problem: Pain  Goal: Verbalizes/displays adequate comfort level or baseline comfort level  Outcome: Progressing     Problem: Chronic Conditions and Co-morbidities  Goal: Patient's chronic conditions and co-morbidity symptoms are monitored and maintained or improved  Outcome: Progressing     Problem: Safety - Adult  Goal: Free from fall injury  Outcome: Progressing     Problem: Skin/Tissue Integrity  Goal: Absence of new skin breakdown  Description: 1. Monitor for areas of redness and/or skin breakdown  2. Assess vascular access sites hourly  3. Every 4-6 hours minimum:  Change oxygen saturation probe site  4. Every 4-6 hours:  If on nasal continuous positive airway pressure, respiratory therapy assess nares and determine need for appliance change or resting period.   Outcome: Progressing     Problem: Nutrition Deficit:  Goal: Optimize nutritional status  3/13/2023 1700 by Adalgisa Manning RN  Outcome: Progressing     Problem: ABCDS Injury Assessment  Goal: Absence of physical injury  Outcome: Progressing     Problem: Skin/Tissue Integrity - Adult  Goal: Skin integrity remains intact  Outcome: Progressing     Problem: Musculoskeletal - Adult  Goal: Return mobility to safest level of function  Outcome: Progressing     Problem: Musculoskeletal - Adult  Goal: Maintain proper alignment of affected body part  Outcome: Progressing     Problem: Musculoskeletal - Adult  Goal: Return ADL status to a safe level of function  Outcome: Progressing     Problem: Genitourinary - Adult  Goal: Urinary catheter remains patent  Outcome: Progressing     Problem: Infection - Adult  Goal: Absence of infection at discharge  Outcome: Progressing     Problem: Infection - Adult  Goal: Absence of infection during hospitalization  Outcome: Progressing     Problem: Anxiety  Goal: Will report anxiety at manageable levels  Description: INTERVENTIONS:  1. Administer medication as ordered  2. Teach and rehearse alternative coping skills  3. Provide emotional support with 1:1 interaction with staff  Outcome: Progressing     Problem: Coping  Goal: Pt/Family able to verbalize concerns and demonstrate effective coping strategies  Description: INTERVENTIONS:  1. Assist patient/family to identify coping skills, available support systems and cultural and spiritual values  2. Provide emotional support, including active listening and acknowledgement of concerns of patient and caregivers  3. Reduce environmental stimuli, as able  4. Instruct patient/family in relaxation techniques, as appropriate  5.  Assess for spiritual pain/suffering and initiate Spiritual Care, Psychosocial Clinical Specialist consults as needed  Outcome: Progressing

## 2023-03-13 NOTE — PROGRESS NOTES
Pulmonary/Critical Care Progress Note    We are following patient for hypothyroidism precipitating myxedema coma, recurrent bilateral pleural effusions, diastolic dysfunction, morbid obesity, alveolar hypoventilation, obstructive sleep apnea, CKD requiring dialysis    SUBJECTIVE:  The patient's gram-negative rods in her sputum have been identified as Enterobacter cloacae which is sensitive to cefepime. Linezolid has been stopped. The patient's TSH is trending downward. She can probably be switched from IV Synthroid to oral Synthroid at a 50 to 75% increase. We have done this already today. Her color is good and her brain is functioning quite normally. We will change hydrocortisone to a small dose of prednisone for now and decrease this as able. Chest x-ray to my view is essentially unchanged from yesterday despite the radiology reading.     MEDICATIONS:   pantoprazole (PROTONIX) 40 mg injection  40 mg IntraVENous Q12H    [START ON 3/14/2023] levothyroxine  175 mcg Oral Daily    epoetin josé luis-epbx  3,000 Units SubCUTAneous Once per day on Mon Wed Fri    hydrocortisone sodium succinate PF  50 mg IntraVENous Q8H    sertraline  50 mg Oral Nightly    rOPINIRole  1 mg Oral TID    carbidopa-levodopa  2 tablet Oral TID    metoprolol tartrate  25 mg Oral BID    fluconazole  200 mg IntraVENous Q24H    apixaban  5 mg Oral BID    insulin lispro  0-4 Units SubCUTAneous Nightly    insulin glargine  8 Units SubCUTAneous BID    insulin lispro  0-8 Units SubCUTAneous TID     QUEtiapine  25 mg Oral Nightly    sodium chloride flush  10 mL IntraVENous 2 times per day    budesonide  0.5 mg Nebulization BID    albuterol  2.5 mg Nebulization Q4H WA    And    ipratropium  0.5 mg Nebulization Q4H WA    cefepime  1,000 mg IntraVENous Q12H    midodrine  10 mg Per NG tube Q MWF      sodium chloride      phenylephrine (JIMENA-SYNEPHRINE) 50 mg/250 mL infusion 10 mcg/min (03/12/23 0260)    dextrose      sodium chloride Stopped (03/11/23 8859)    DOPamine Stopped (03/11/23 1743)     sodium chloride, glucose, dextrose bolus **OR** dextrose bolus, glucagon (rDNA), dextrose, sodium chloride flush, sodium chloride, promethazine **OR** ondansetron, polyethylene glycol, acetaminophen **OR** acetaminophen      REVIEW OF SYSTEMS:  Constitutional: Denies fever, weight loss, night sweats, and fatigue  Skin: Denies pigmentation, dark lesions, and rashes   HEENT: Denies hearing loss, tinnitus, ear drainage, epistaxis, sore throat, and hoarseness. Cardiovascular: Denies palpitations, chest pain, and chest pressure. Respiratory: Denies cough, dyspnea at rest, hemoptysis, apnea, and choking. Gastrointestinal: Denies nausea, vomiting, poor appetite, diarrhea, heartburn or reflux  Genitourinary: Denies dysuria, frequency, urgency or hematuria  Musculoskeletal: Denies myalgias, muscle weakness, and bone pain  Neurological: Denies dizziness, vertigo, headache, and focal weakness  Psychological: Denies anxiety and depression  Endocrine: Denies heat intolerance and cold intolerance  Hematopoietic/Lymphatic: Denies bleeding problems and blood transfusions    OBJECTIVE:  Vitals:    03/13/23 1200   BP: (!) 156/85   Pulse: 82   Resp: 14   Temp: 97.6 °F (36.4 °C)   SpO2: 94%     FiO2 : 30 %  O2 Flow Rate (L/min): 2 L/min  O2 Device: Nasal cannula    PHYSICAL EXAM:  Constitutional: No fever, chills, diaphoresis  Skin: No skin rash, no skin breakdown. Chronic venous stasis changes of lower extremities as per her normal  HEENT: Mucous membranes are moist  Neck: No JVD, lymphadenopathy, thyromegaly  Cardiovascular: 1, S2 regular. No S3 or rubs present  Respiratory: Slightly diminished breath sounds both lower lung fields  Gastrointestinal: Soft, obese, nontender. No hepatosplenomegaly  Genitourinary: No CVA tenderness  Extremities: Minimal edema at feet and legs  Neurological: Awake, alert, oriented x3.   Much improved from last month when she was not known to have hypothyroidism  Psychological: Improved affect.   More optimistic than I can remember    LABS:  WBC   Date Value Ref Range Status   03/13/2023 5.0 4.5 - 11.5 E9/L Final   03/12/2023 4.0 (L) 4.5 - 11.5 E9/L Final   03/11/2023 9.9 4.5 - 11.5 E9/L Final     Hemoglobin   Date Value Ref Range Status   03/13/2023 8.1 (L) 11.5 - 15.5 g/dL Final   03/12/2023 9.2 (L) 11.5 - 15.5 g/dL Final   03/12/2023 7.2 (L) 11.5 - 15.5 g/dL Final     Hematocrit   Date Value Ref Range Status   03/13/2023 31.6 (L) 34.0 - 48.0 % Final   03/12/2023 32.3 (L) 34.0 - 48.0 % Final   03/12/2023 25.7 (L) 34.0 - 48.0 % Final     MCV   Date Value Ref Range Status   03/13/2023 95.8 80.0 - 99.9 fL Final   03/12/2023 89.5 80.0 - 99.9 fL Final   03/11/2023 87.8 80.0 - 99.9 fL Final     Platelets   Date Value Ref Range Status   03/13/2023 87 (L) 130 - 450 E9/L Final   03/12/2023 108 (L) 130 - 450 E9/L Final   03/11/2023 189 130 - 450 E9/L Final     Sodium   Date Value Ref Range Status   03/13/2023 139 132 - 146 mmol/L Final   03/12/2023 137 132 - 146 mmol/L Final   03/11/2023 135 132 - 146 mmol/L Final     Potassium   Date Value Ref Range Status   03/13/2023 4.2 3.5 - 5.0 mmol/L Final   03/12/2023 3.0 (L) 3.5 - 5.0 mmol/L Final   03/11/2023 3.7 3.5 - 5.0 mmol/L Final     Potassium reflex Magnesium   Date Value Ref Range Status   02/20/2023 3.6 3.5 - 5.0 mmol/L Final   02/19/2023 4.0 3.5 - 5.0 mmol/L Final   02/18/2023 3.7 3.5 - 5.0 mmol/L Final     Chloride   Date Value Ref Range Status   03/13/2023 99 98 - 107 mmol/L Final   03/12/2023 97 (L) 98 - 107 mmol/L Final   03/11/2023 93 (L) 98 - 107 mmol/L Final     CO2   Date Value Ref Range Status   03/13/2023 27 22 - 29 mmol/L Final   03/12/2023 29 22 - 29 mmol/L Final   03/11/2023 27 22 - 29 mmol/L Final     BUN   Date Value Ref Range Status   03/13/2023 46 (H) 6 - 23 mg/dL Final   03/12/2023 33 (H) 6 - 23 mg/dL Final   03/11/2023 18 6 - 23 mg/dL Final     Creatinine   Date Value Ref Range Status 03/13/2023 2.7 (H) 0.5 - 1.0 mg/dL Final   03/12/2023 2.5 (H) 0.5 - 1.0 mg/dL Final   03/11/2023 1.9 (H) 0.5 - 1.0 mg/dL Final     Glucose   Date Value Ref Range Status   03/13/2023 160 (H) 74 - 99 mg/dL Final   03/12/2023 129 (H) 74 - 99 mg/dL Final   03/11/2023 211 (H) 74 - 99 mg/dL Final     Calcium   Date Value Ref Range Status   03/13/2023 8.4 (L) 8.6 - 10.2 mg/dL Final   03/12/2023 8.4 (L) 8.6 - 10.2 mg/dL Final   03/11/2023 9.0 8.6 - 10.2 mg/dL Final     Total Protein   Date Value Ref Range Status   03/11/2023 6.5 6.4 - 8.3 g/dL Final   03/11/2023 6.4 6.4 - 8.3 g/dL Final   03/10/2023 6.0 (L) 6.4 - 8.3 g/dL Final     Albumin   Date Value Ref Range Status   03/11/2023 4.1 3.5 - 5.2 g/dL Final   03/10/2023 3.8 3.5 - 5.2 g/dL Final   02/20/2023 4.6 3.5 - 5.2 g/dL Final     Total Bilirubin   Date Value Ref Range Status   03/11/2023 1.0 0.0 - 1.2 mg/dL Final   03/10/2023 0.5 0.0 - 1.2 mg/dL Final   02/20/2023 0.7 0.0 - 1.2 mg/dL Final     Alkaline Phosphatase   Date Value Ref Range Status   03/11/2023 94 35 - 104 U/L Final   03/10/2023 81 35 - 104 U/L Final   02/20/2023 53 35 - 104 U/L Final     AST   Date Value Ref Range Status   03/11/2023 7 0 - 31 U/L Final   03/10/2023 6 0 - 31 U/L Final   02/20/2023 7 0 - 31 U/L Final     ALT   Date Value Ref Range Status   03/11/2023 <5 0 - 32 U/L Final   03/10/2023 <5 0 - 32 U/L Final   02/20/2023 <5 0 - 32 U/L Final     Est, Glom Filt Rate   Date Value Ref Range Status   03/13/2023 18 >=60 mL/min/1.73 Final     Comment:     Pediatric calculator link  [email or web address]/gfr_calculatorped  Effective Oct 3, 2022  These results are not intended for use in patients  <25years of age. eGFR results are calculated without  a race factor using the 2021 CKD-EPI equation. Careful  clinical correlation is recommended, particularly when  comparing to results calculated using previous equations.   The CKD-EPI equation is less accurate in patients with  extremes of muscle mass, extra-renal metabolism of  creatinine, excessive creatinine ingestion, or following  therapy that affects renal tubular secretion. 03/12/2023 20 >=60 mL/min/1.73 Final     Comment:     Pediatric calculator link  "[employer].at. [email or web address]_[email or web address]  Effective Oct 3, 2022  These results are not intended for use in patients  <25years of age. eGFR results are calculated without  a race factor using the 2021 CKD-EPI equation. Careful  clinical correlation is recommended, particularly when  comparing to results calculated using previous equations. The CKD-EPI equation is less accurate in patients with  extremes of muscle mass, extra-renal metabolism of  creatinine, excessive creatinine ingestion, or following  therapy that affects renal tubular secretion. 03/11/2023 27 >=60 mL/min/1.73 Final     Comment:     Pediatric calculator link  "[employer].at. [email or web address]/[email or web address]_[email or web address]  Effective Oct 3, 2022  These results are not intended for use in patients  <25years of age. eGFR results are calculated without  a race factor using the 2021 CKD-EPI equation. Careful  clinical correlation is recommended, particularly when  comparing to results calculated using previous equations. The CKD-EPI equation is less accurate in patients with  extremes of muscle mass, extra-renal metabolism of  creatinine, excessive creatinine ingestion, or following  therapy that affects renal tubular secretion.        GFR    Date Value Ref Range Status   10/16/2022 >60  Final   10/14/2022 59  Final   10/13/2022 >60  Final     Magnesium   Date Value Ref Range Status   03/13/2023 1.8 1.6 - 2.6 mg/dL Final   03/12/2023 1.7 1.6 - 2.6 mg/dL Final   03/11/2023 1.9 1.6 - 2.6 mg/dL Final     Phosphorus   Date Value Ref Range Status   03/13/2023 3.7 2.5 - 4.5 mg/dL Final   03/12/2023 2.9 2.5 - 4.5 mg/dL Final   03/11/2023 2.0 (L) 2.5 - 4.5 mg/dL Final Recent Labs     03/12/23  0531   PH 7.439   PO2 93.4   PCO2 43.5   HCO3 28.8*   BE 4.2*   O2SAT 97.5       RADIOLOGY:  CT ABDOMEN PELVIS WO CONTRAST Additional Contrast? None   Final Result   Bilateral pleural effusions with small amount of fluid throughout the upper   abdomen. Anasarca seen within the soft tissues with no evidence of acute   intra-abdominopelvic abnormality. No evidence of a retroperitoneal hematoma   or bleed. XR CHEST PORTABLE   Final Result   Slightly worsening aeration on the right with slightly improving aeration on   the left. Suspected underlying pleural effusions, atelectasis and or   infiltrate. Suspected pericardial effusion as before. XR CHEST PORTABLE   Final Result   Trace right pleural effusion or thickening. Mild interstitial prominence, chronic changes versus component of edema or   atypical infection. Bibasilar subsegmental atelectasis. Mild cardiomegaly. CT CHEST WO CONTRAST   Final Result   1. Moderate right and small left pleural effusions with associated   compressive atelectasis. 2. Elevation of the right hemidiaphragm with underlying moderate perihepatic   ascites. 3. Moderate cardiomegaly and small pericardial effusion. 4. Endotracheal tube, gastric tube, and right jugular tunneled implanted   hemodialysis catheter with good positioning. RECOMMENDATIONS:   Careful clinical correlation and follow up recommended. IR GUIDED THORACENTESIS PLEURAL   Final Result   Successful ultrasound guided thoracentesis. XR ABDOMEN FOR NG/OG/NE TUBE PLACEMENT   Final Result   1. Endotracheal tube and gastric tube with good positioning. 2. Right jugular hemodialysis catheter remains with good positioning. 3. Improved aeration of the right base with small right pleural effusion now   evident. RECOMMENDATION:   Careful clinical correlation and follow up recommended. XR CHEST PORTABLE   Final Result   1. Endotracheal tube and gastric tube with good positioning.   2. Right jugular hemodialysis catheter remains with good positioning.   3. Improved aeration of the right base with small right pleural effusion now   evident.      RECOMMENDATION:   Careful clinical correlation and follow up recommended.         XR CHEST PORTABLE   Final Result   Mild pulmonary vascular congestion.      Interval worsening of right upper lobe and right middle lobe atelectasis.      Persistent right lower lobe atelectasis and/or pneumonitis.      Small right pleural effusion.                 PROBLEM LIST:  Principal Problem:    Myxedema coma (HCC)  Resolved Problems:    * No resolved hospital problems. *      IMPRESSION:  Myxedema coma, improved  Acute superimposed on chronic hypercapnic respiratory failure  Small bilateral pleural effusions  Probable diastolic dysfunction  CKD on dialysis  Probable obstructive sleep apnea on BiPAP at night  Status post recent thoracentesis (1000 cc of transudative fluid)  Chronic atrial fibrillation  Previously intubated, now extubated for several days  Enterobacter cloacae complex sensitive to cefepime      PLAN:  Dialysis today  Complete weaning off pressors  Cefepime for Enterobacter pneumonia  Aerosolized bronchodilators  Dialysis per nephrology  Continue metoprolol for rate control is helping  Chest x-ray in a.m.  PT and OT to continue to help mobilize patient  Change IV to p.o. L-thyroxine appropriate dose alteration  Changed to oral steroids    ATTESTATION:  ICU Staff Physician note of personal involvement in Care  As the attending physician, I certify that I personally reviewed the patient’s history and personally examined the patient to confirm the physical findings described above,  And that I reviewed the relevant imaging studies and available reports.  I also discussed the differential diagnosis and all of the proposed management plans with the patient and individuals accompanying the patient to  this visit. They had the opportunity to ask questions about the proposed management plans and to have those questions answered. This patient has a high probability of sudden, clinically significant deterioration, which requires the highest level of physician preparedness to intervene urgently. I managed/supervised life or organ supporting interventions that required frequent physician assessment. I devoted my full attention to the direct care of this patient for the amount of time indicated below. Time I spent with the family or surrogate(s) is included only if the patient was incapable of providing the necessary information or participating in medical decisions - Time devoted to teaching and to any procedures I billed separately is not included.     CRITICAL CARE TIME:  30 minutes    Electronically signed by Long Watson MD on 3/13/2023 at 1:41 PM

## 2023-03-13 NOTE — PROGRESS NOTES
NAME: Alvarez Galvez  MR:  [id number]  :   1950  Admit Date:  3/9/2023      This is a face to face encounter with [facility] of this note, including Diagnosis,  Interval History, Past Medical/Surgical/Family/Social Histories, ROS, physical exam, and Assessment and Plan were copied and pasted from Previous. Updates have been made where noted and reflect current exam and medical decision making from the DOS of this encounter. CHIEF COMPLAINT     ID following for   Chief Complaint   Patient presents with    Hypotension     HISTORY OF PRESENT ILLNESS     Alvarez Galvez is a 67 y.o. 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 Pt seen and examined 23  In icu   Extubated 3/11    Awake and alert had diarrhea  Afebrile on 2L no cough   Cr2.7 wbc5   No reported adverse drug reactions.   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]  Elevated troponin [R77.8]  Acute respiratory failure with hypercapnia (HCC) [J96.02]  Chronic renal failure, stage 4 (severe) (HCC) [N18.4]  Hypotension, unspecified hypotension type [I95.9]  Acute on chronic congestive heart failure, unspecified heart failure type ([address] [I50.9]    ID following for   Pt with respiratory failure   Leukopenia   Right pleural effusion h/o + gram stain from pleural fluid  -1000 cc of clear yellow  color pleural fluid drained from patient  -resp cx E cloacae/C albicans   -oral candidiasis  Kidney failure recently started on hemo urine cx E cloacae   LEFT LE HEALED   MRSA neg         cefepime (MAXIPIME) 1,000 mg in sodium chloride 0.9 % 50 mL IVPB ([id number]), Q12H  linezolid (ZYVOX) tablet 600 mg, 2 times per day will stop due to thrombocytopenia  Fluconazole day 3        BP (!) 156/85   Pulse 82   Temp 97.6 °F (36.4 °C) (Bladder)   Resp 14   Ht 5' (1.524 m)   Wt 207 lb 14.4 oz (94.3 kg)   SpO2 94%   BMI 40.60 kg/m²   Temp  Av.2 °F (36.8 °C)  Min: 97.6 °F (36.4 °C)  Max: 99.1 °F (37.3 °C)  CONSTITUTIONAL:  no apparent distress,  pale comfortable  ENT:  Normocephalic, atraumatic,     LUNGS:  No increased work of breathing,dec  to auscultation bilaterally, no crackles or wheezing   CARDIOVASCULAR:    regular rate and rhythm, normal S1 and S2,  no murmur noted  ABDOMEN:  normal bowel sounds, soft,  -distended, non-tender pannus   MUSCULOSKELETAL:  swelling  BLE. NEUROLOGIC:  Awake, alert, oriented. Les Pimple SKIN:  normal skin color, texture, turgor and no rashes  Lines:       Central Venous Catheter:  CVC Triple Lumen 03/10/23 Right Femoral (Active)   Central Line Being Utilized Yes 23   Criteria for Appropriate Use Hemodynamically unstable, requiring monitoring lines, vasopressors, or volume resuscitation 23 040   Site Assessment Clean, dry & intact 23   Phlebitis Assessment No symptoms 23   Infiltration Assessment 0 23   Color/Movement/Sensation Capillary refill less than 3 sec 23   Proximal Lumen Color/Status Blue; Infusing;Flushed;Brisk blood return 23 040   Medial Lumen Status White; Infiltrated; Flushed;Brisk blood return 23   Distal Lumen Color/Status Red; Infusing;Flushed;Brisk blood return 23 4023   Line Care Connections checked and tightened 03/11/23 0400   Dressing Type Transparent; Antimicrobial 03/11/23 0400   Dressing Status Clean, dry & intact 03/11/23 0400   Dressing Intervention New 03/10/23 1600           Peripheral Intravenous Line:  Peripheral IV 03/10/23 Left Antecubital (Active)   Site Assessment Clean;Dry; Intact 03/11/23 0400   Line Status Normal saline locked; Flushed;Blood return noted 03/11/23 0400   Line Care Connections checked and tightened 03/11/23 0400   Phlebitis Assessment No symptoms 03/11/23 0400   Infiltration Assessment 0 03/11/23 0400   Alcohol Cap Used No 03/11/23 0400   Dressing Status Dry; Intact 03/11/23 0400   Dressing Type Transparent 03/11/23 0400   Dressing Intervention New 03/10/23 1600           Drain(s):  NG/OG/NJ/NE Tube Nasogastric 14 fr Left nostril (Active)   Surrounding Skin Clean, dry & intact 03/10/23 2151   Securement device Tape 03/10/23 2151   Status Suction-low intermittent 03/10/23 2151   Placement Verified X-Ray (Initial) 03/10/23 2151   NG/OG/NJ/NE External Measurement (cm) 70 cm 03/10/23 2151   Drainage Appearance Bile 03/10/23 2151   Free Water/Flush (mL) 80 mL 03/10/23 2151   Output (mL) 200 ml 03/10/23 1713       Urinary Catheter 03/10/23 Diaz-Temperature (Active)   $ Urethral catheter insertion $ Not inserted for procedure 03/10/23 0150   Catheter Indications Need for fluid volume management of the critically ill patient in a critical care setting 03/11/23 0400   Urine Color Straw 03/11/23 0400   Urine Appearance Clear 03/11/23 0400   Collection Container Standard 03/11/23 0400   Securement Method Leg strap 03/11/23 0400   Catheter Care  Soap and water 03/10/23 0800   Status Draining 03/11/23 0400   Output (mL) 450 mL 03/11/23 0400        Microbiology:   No results for input(s): COVID19 in the last 72 hours.     Lab Results   Component Value Date/Time    BC 24 Hours no growth 03/11/2023 09:00 AM    BC 24 Hours no growth 03/11/2023 08:30 AM    BC 24 Hours no growth 03/10/2023 12:01 AM BLOODCULT2 24 Hours no growth 03/10/2023 12:01 AM    BLOODCULT2 5 Days no growth 01/17/2023 05:23 PM    BLOODCULT2 5 Days no growth 10/12/2022 04:59 PM    ORG Enterobacter cloacae complex 03/10/2023 02:43 PM    ORG Candida albicans 03/10/2023 02:43 PM    ORG Enterobacter cloacae complex 03/10/2023 03:25 AM     CULTURE, RESPIRATORY   Date Value Ref Range Status   03/10/2023 Oral Pharyngeal Shavon reduced (A)  Final   03/10/2023 One colony  Final   03/10/2023 Moderate growth  Final        WOUND/ABSCESS   Date Value Ref Range Status   01/17/2023 Light growth  Final   07/05/2022 Heavy growth  Final   07/05/2022 Heavy growth  Final     Smear, Respiratory   Date Value Ref Range Status   03/10/2023   Final    Moderate Polymorphonuclear leukocytes  Few Epithelial cells  Rare yeast  Rare Gram positive cocci           Component Value Date/Time    AFBCX No growth after 6 weeks of incubation. 10/18/2022 1106    AFBCX No growth after 6 weeks of incubation. 07/08/2022 1546    FUNGSM No Fungal elements seen 07/08/2022 1546    LABFUNG No growth after 4 weeks of incubation.  07/08/2022 1546       MRSA Culture Only   Date Value Ref Range Status   03/10/2023 Methicillin resistant Staph aureus not isolated  Final       LABS:  Recent Labs     03/11/23 0421 03/12/23 0434 03/12/23  1149 03/13/23  0408   WBC 9.9 4.0*  --  5.0   RBC 4.09 2.87*  --  3.30*   HGB 10.2* 7.2* 9.2* 8.1*   HCT 35.9 25.7* 32.3* 31.6*   MCV 87.8 89.5  --  95.8   MCH 24.9* 25.1*  --  24.5*   MCHC 28.4* 28.0*  --  25.6*   RDW 19.8* 19.6*  --  19.2*    108*  --  87*   MPV 10.2 10.7  --  11.7       Recent Labs     03/11/23 0421 03/12/23  0434 03/13/23  0408    137 139   K 3.7 3.0* 4.2   CL 93* 97* 99   CO2 27 29 27   BUN 18 33* 46*   CREATININE 1.9* 2.5* 2.7*   GLUCOSE 211* 129* 160*   PROT 6.4  6.5  --   --    LABALBU 4.1  --   --    CALCIUM 9.0 8.4* 8.4*   BILITOT 1.0  --   --    ALKPHOS 94  --   --    AST 7  --   --    ALT <5  --   --        Lab Results   Component Value Date    SEDRATE 6 07/06/2022    SEDRATE 15 10/24/2021    SEDRATE 115 (H) 02/03/2021     Lab Results   Component Value Date    CRP 5.0 (H) 07/06/2022    CRP 0.7 (H) 10/25/2021    CRP 13.5 (H) 02/03/2021     Lab Results   Component Value Date    PROCAL 0.40 (H) 03/10/2023    PROCAL 0.89 (H) 02/16/2023    PROCAL 0.33 (H) 01/18/2023     Recent Labs     03/11/23  0421 03/13/23  0408   FERRITIN  --  230   *  --    AST 7  --    ALT <5  --          REVIEW OF SYSTEMS     As stated above in the chief complaint, otherwise negative.   CURRENT MEDICATIONS     Current Facility-Administered Medications:     pantoprazole (PROTONIX) 40 mg in sodium chloride (PF) 0.9 % 10 mL injection, 40 mg, IntraVENous, Q12H, RASHARD Mcdonnell - CNP, 40 mg at 03/13/23 0644    0.9 % sodium chloride infusion, , IntraVENous, PRN, RASHARD Mcdonnell - CNP    phenylephrine (JIMENA-SYNEPHRINE) 50 mg in sodium chloride 0.9 % 250 mL infusion,  mcg/min, IntraVENous, Continuous, RASHARD Mcdonnell CNP, Last Rate: 3 mL/hr at 03/12/23 1508, 10 mcg/min at 03/12/23 1508    epoetin josé luis-epbx (RETACRIT) injection 3,000 Units, 3,000 Units, SubCUTAneous, Once per day on Mon Wed Fri, Cassandraalbert Robert, APRN - CNP, 3,000 Units at 03/13/23 2097    hydrocortisone sodium succinate PF (SOLU-CORTEF) injection 50 mg, 50 mg, IntraVENous, Q8H, Jose Antonio Hansen MD, 50 mg at 03/13/23 0926    sertraline (ZOLOFT) tablet 50 mg, 50 mg, Oral, Nightly, Jose Antonio Hansen MD, 50 mg at 03/12/23 2202    rOPINIRole (REQUIP) tablet 1 mg, 1 mg, Oral, TID, Jose Antonio Hansen MD, 1 mg at 03/13/23 5250    carbidopa-levodopa (SINEMET CR)  MG per extended release tablet 2 tablet, 2 tablet, Oral, TID, Jose Antonio Hansen MD, 2 tablet at 03/13/23 0937    metoprolol tartrate (LOPRESSOR) tablet 25 mg, 25 mg, Oral, BID, Jose Antonio Hansen MD, 25 mg at 03/13/23 0925    fluconazole (DIFLUCAN) in 0.9 % sodium chloride IVPB 200 mg, 200 mg, IntraVENous, Q24H, Iris Salgado MD, Last Rate: 100 mL/hr at 03/13/23 0936, 200 mg at 03/13/23 0936    glucose chewable tablet 16 g, 4 tablet, Oral, PRN, Ace Nimesh Barone MD    dextrose bolus 10% 125 mL, 125 mL, IntraVENous, PRN **OR** dextrose bolus 10% 250 mL, 250 mL, IntraVENous, PRN, Armaan Ley MD    glucagon (rDNA) injection 1 mg, 1 mg, SubCUTAneous, PRN, Armaan Ley MD    dextrose 10 % infusion, , IntraVENous, Continuous PRN, Armaan Ley MD    apixaban Zerita Hussar) tablet 5 mg, 5 mg, Oral, BID, Alvaro Wu MD, 5 mg at 03/11/23 2046    insulin lispro (HUMALOG) injection vial 0-4 Units, 0-4 Units, SubCUTAneous, Nightly, Armaan Ley MD, 4 Units at 03/11/23 2051    insulin glargine (LANTUS) injection vial 8 Units, 8 Units, SubCUTAneous, BID, Armaan Ley MD, 8 Units at 03/13/23 0928    insulin lispro (HUMALOG) injection vial 0-8 Units, 0-8 Units, SubCUTAneous, TID WC, Armaan Ley MD, 4 Units at 03/13/23 2700    QUEtiapine (SEROQUEL) tablet 25 mg, 25 mg, Oral, Nightly, Reji Lauren, APRN - CNP, 25 mg at 03/12/23 2201    levothyroxine (SYNTHROID) injection 100 mcg, 100 mcg, IntraVENous, Daily, Reji Fret, APRN - CNP, 100 mcg at 03/13/23 2289    sodium chloride flush 0.9 % injection 10 mL, 10 mL, IntraVENous, 2 times per day, Lee Mcclure MD, 10 mL at 03/13/23 0936    sodium chloride flush 0.9 % injection 10 mL, 10 mL, IntraVENous, PRN, Lee Mcclure MD, 10 mL at 03/11/23 1956    0.9 % sodium chloride infusion, , IntraVENous, PRN, Lee Mcclure MD, Stopped at 03/11/23 0921    promethazine (PHENERGAN) tablet 12.5 mg, 12.5 mg, Oral, Q6H PRN **OR** ondansetron (ZOFRAN) injection 4 mg, 4 mg, IntraVENous, Q6H PRN, Lee Mcclure MD, 4 mg at 03/12/23 1102    polyethylene glycol (GLYCOLAX) packet 17 g, 17 g, Oral, Daily PRN, Lee Mcclure MD    acetaminophen (TYLENOL) tablet 650 mg, 650 mg, Oral, Q6H PRN, 650 mg at 03/13/23 1027 **OR** acetaminophen (TYLENOL) suppository 650 mg, 650 mg, Rectal, Q6H PRN, Dawit MD Abelardo    DOPamine (INTROPIN) 800 mg in dextrose 5 % 250 mL infusion, 1-20 mcg/kg/min, IntraVENous, Continuous, RASHARD Solis CNP, Stopped at 03/11/23 1743    budesonide (PULMICORT) nebulizer suspension 500 mcg, 0.5 mg, Nebulization, BID, RASHARD Solis - CNP, 500 mcg at 03/13/23 0456    albuterol (PROVENTIL) nebulizer solution 2.5 mg, 2.5 mg, Nebulization, Q4H WA, 2.5 mg at 03/13/23 1006 **AND** ipratropium (ATROVENT) 0.02 % nebulizer solution 0.5 mg, 0.5 mg, Nebulization, Q4H WA, RASHARD Solis CNP, 0.5 mg at 03/13/23 1006    cefepime (MAXIPIME) 1,000 mg in sodium chloride 0.9 % 50 mL IVPB ([id number]), 1,000 mg, IntraVENous, Q12H, Cristina Baum MD, Stopped at 03/13/23 0853    linezolid (ZYVOX) tablet 600 mg, 600 mg, Oral, 2 times per day, Jose Harris MD, 600 mg at 03/13/23 0925    midodrine (PROAMATINE) tablet 10 mg, 10 mg, Per NG tube, Q MWF, RASHARD Gonzales CNP  DIAGNOSTIC RESULTS   Radiology:  CT CHEST WO CONTRAST    Result Date: 3/11/2023  EXAMINATION: CT OF THE CHEST WITHOUT CONTRAST 3/10/2023 4:39 pm TECHNIQUE: CT of the chest was performed without the administration of intravenous contrast. Multiplanar reformatted images are provided for review. Automated exposure control, iterative reconstruction, and/or weight based adjustment of the mA/kV was utilized to reduce the radiation dose to as low as reasonably achievable. COMPARISON: Radiographs of the same day. HISTORY: ORDERING SYSTEM PROVIDED HISTORY: Pleural effusion TECHNOLOGIST PROVIDED HISTORY: Reason for exam:->Pleural effusion FINDINGS: Mediastinum: Tunnel right jugular hemodialysis catheter tip well positioned proximal right atrium. Endotracheal tube tip with good position 2.6 cm above the juan francisco. Gastric tube with good position proximal side hole beneath the hemidiaphragms. Normal caliber and contour of the thoracic aorta. No mediastinal adenopathy detected. Moderate cardiomegaly. Small pericardial effusion. Lungs/pleura: Moderate right and small left pleural effusions with associated compressive atelectasis. Mild re-expansion edema through the right lung, status post thoracentesis noted. Elevated right hemidiaphragm with moderate perihepatic ascites observed in the upper abdomen. Upper Abdomen: Moderate mainly perihepatic ascites. Gastric tube with good positioning. Soft Tissues/Bones: No acute osseous or body wall soft tissue abnormalities. 1. Moderate right and small left pleural effusions with associated compressive atelectasis. 2. Elevation of the right hemidiaphragm with underlying moderate perihepatic ascites. 3. Moderate cardiomegaly and small pericardial effusion. 4. Endotracheal tube, gastric tube, and right jugular tunneled implanted hemodialysis catheter with good positioning. RECOMMENDATIONS: Careful clinical correlation and follow up recommended. IR FLUORO GUIDED CVA DEVICE PLMT/REPLACE/REMOVAL    Result Date: 2/9/2023  PROCEDURE: IR FLUOROSCOPY GUIDED CENTRAL VENOUS ACCESS DEVICE PLACEMENT; US GUIDED VASCULAR ACCESS MODERATE CONSCIOUS SEDATION 2/9/2023 HISTORY: ORDERING SYSTEM PROVIDED HISTORY: Tunneled HD line along with separate iv access TECHNOLOGIST PROVIDED HISTORY: Reason for exam:->Tunneled HD line along with separate iv access TECHNIQUE: Ultrasound and fluoroscopic guided CONTRAST: None SEDATION: Moderate sedation was ordered and supervised by the attending with physician face-to-face monitoring. Medications were provided and recorded by Radiology nurses. The administration, documentation and monitoring of IV conscious sedation under my direct supervision for time frame of 20 minutes. 50 mcg of IV fentanyl was administered. Interventional radiology nursing staff monitored the patient the throughout the exam. FLUOROSCOPY DOSE AND TYPE: Radiation Exposure Indices: Fluoroscopy time equals 0.3 minutes.   Total dose equals 3.29 mGy DESCRIPTION OF PROCEDURE: Informed consent was obtained after a detailed explanation of the procedure including risks, benefits, and alternatives.  Universal protocol was observed. Sterile gowns, masks, hats and gloves utilized for maximal sterile barrier. FINDINGS: The patient's neck was prepped and draped in a sterile fashion using maximum sterile barrier technique. Ultrasound images demonstrate a patent right internal jugular vein. Following the uneventful administration of lidocaine using ultrasound guidance I introduced a micro puncture needle into the right internal jugular vein.  Through the micro needle a microwire was advanced.  Over the microwire a micro sheath was placed.  Through the micro sheath an Amplatz wire was advanced into the superior vena cava. 2 dilators were used to dilate a tract into the right internal jugular vein. I then anesthetized a tract along the chest wall measuring 10 cm.  Using a blunt tunneling device I tunneled the catheter to the dilator within the right internal jugular vein. Over the Amplatz wire peel-away sheath was placed.  Through the peel-away sheath the dual lumen 15 Czech 28 cm dialysis catheter was placed.  The catheter tip is positioned at the SVC right atrial junction. The patient tolerated the procedure well and there were no complications. The catheter was then secured to the skin with 2-0 silk suture.  The incision overlying the right internal jugular vein was also sutured with 2-0 silk suture.     Successful placement of a tunneled dialysis catheter via the right internal jugular vein. Administration of conscious sedation.     XR CHEST PORTABLE    Result Date: 3/10/2023  EXAMINATION: ONE XRAY VIEW OF THE CHEST; ONE SUPINE XRAY VIEW(S) OF THE ABDOMEN 3/10/2023 5:49 am COMPARISON: 4 hours prior. HISTORY: ORDERING SYSTEM PROVIDED HISTORY: ett placement TECHNOLOGIST PROVIDED HISTORY: Reason for exam:->ett placement; ORDERING SYSTEM PROVIDED HISTORY: Confirmation of  course of NG/OG/NE tube and location of tip of tube TECHNOLOGIST PROVIDED HISTORY: Reason for exam:->Confirmation of course of NG/OG/NE tube and location of tip of tube Portable? ->Yes FINDINGS: Endotracheal tube tip with good positioning 3.7 cm above the juan francisco. Gastric tube with good position, proximal side hole beneath the hemidiaphragms. Right jugular hemodialysis catheter remains with good positioning. Obscuration of the right cardiac base and right hemidiaphragm. Improving aeration right base. Small right pleural effusion noted. No pneumothorax. Body wall soft tissues unremarkable. Osseous thorax intact. 1. Endotracheal tube and gastric tube with good positioning. 2. Right jugular hemodialysis catheter remains with good positioning. 3. Improved aeration of the right base with small right pleural effusion now evident. RECOMMENDATION: Careful clinical correlation and follow up recommended. XR CHEST PORTABLE    Result Date: 3/10/2023  EXAMINATION: ONE XRAY VIEW OF THE CHEST 3/9/2023 11:39 pm COMPARISON: Chest one view, 02/23/2023 HISTORY: ORDERING SYSTEM PROVIDED HISTORY: hypotension TECHNOLOGIST PROVIDED HISTORY: Reason for exam:->hypotension FINDINGS: Lines, tubes, and devices: There is stable position of right internal jugular tip localized at the atriocaval junction. Lungs and pleura: There is mild pulmonary vascular congestion and persistent small right pleural effusion. There is interval worsening atelectasis in the right middle lobe and right upper lobe. Right lower lobe atelectasis and/or pneumonitis persists. No pneumothorax. Cardiomediastinal silhouette: The mediastinum is stable. The heart size is normal. Bones and soft tissues: There are surgical clips in right axilla. Surgical clips are superimposed over right upper quadrant. Mild pulmonary vascular congestion. Interval worsening of right upper lobe and right middle lobe atelectasis.  Persistent right lower lobe atelectasis and/or pneumonitis. Small right pleural effusion. XR CHEST PORTABLE    Result Date: 2/23/2023  EXAMINATION: ONE XRAY VIEW OF THE CHEST 2/23/2023 3:58 pm COMPARISON: February 23, 2023 HISTORY: ORDERING SYSTEM PROVIDED HISTORY: post procedure TECHNOLOGIST PROVIDED HISTORY: Reason for exam:->post procedure FINDINGS: Cardiomegaly. Lungs clear with mild right basilar atelectasis. No pneumothorax or effusion. Osseous thorax intact. Right jugular hemodialysis catheter tip remains well positioned at the cavoatrial junction. No acute disease. RECOMMENDATION: Careful clinical correlation and follow up recommended. XR CHEST PORTABLE    Result Date: 2/22/2023  EXAMINATION: ONE XRAY VIEW OF THE CHEST 2/22/2023 2:36 pm COMPARISON: 10/16/2023. HISTORY: ORDERING SYSTEM PROVIDED HISTORY: pleural effusion evaluation TECHNOLOGIST PROVIDED HISTORY: Reason for exam:->pleural effusion evaluation FINDINGS: The lungs are without acute focal process. There is no effusion or pneumothorax. Cardiomegaly. The osseous structures are without acute process. Right-sided central line catheter at the caval atrial junction. No sizable pleural effusion. XR CHEST PORTABLE    Result Date: 2/16/2023  EXAMINATION: ONE XRAY VIEW OF THE CHEST 2/16/2023 6:33 am COMPARISON: 02/15/2023 HISTORY: ORDERING SYSTEM PROVIDED HISTORY: F/U pleural effusion TECHNOLOGIST PROVIDED HISTORY: Reason for exam:->F/U pleural effusion FINDINGS: Moderate cardiomegaly is unchanged. There is mild pulmonary venous congestion. Right IJ dialysis catheter is stable. Prior noted infiltrate versus atelectasis at the left lung base has resolved. There is improved aeration the right upper lobe with minimal residual apical density. No pneumothorax is identified. Bony structures are unremarkable. Improvement in bilateral lung infiltrates as described.      XR CHEST PORTABLE    Result Date: 2/15/2023  EXAMINATION: ONE XRAY VIEW OF THE CHEST 2/15/2023 5:28 am COMPARISON: 14 February 2023 HISTORY: ORDERING SYSTEM PROVIDED HISTORY: SOB TECHNOLOGIST PROVIDED HISTORY: Reason for exam:->SOB FINDINGS: Unchanged right-sided central venous catheter. A right pleural effusion appears diminished. There is now new opacity at the right upper lobe which could be infiltrate and or atelectasis. There is unchanged infiltrate and or atelectasis at the left lower lobe is well. Diminished right pleural effusion. New infiltrate and or atelectasis at the right upper lobe. Stable infiltrate and or atelectasis at the left lower lobe. XR CHEST 1 VIEW    Result Date: 2/14/2023  EXAMINATION: ONE XRAY VIEW OF THE CHEST 2/14/2023 3:28 pm COMPARISON: 02/09/2023 HISTORY: ORDERING SYSTEM PROVIDED HISTORY: Post Chest Tube Placement TECHNOLOGIST PROVIDED HISTORY: Reason for exam:->Post Chest Tube Placement FINDINGS: There is cardiomegaly. Large pleural effusion with infiltrates and atelectasis in the right lung is identified with small amount of ventilated right upper lobe. There is infiltrates and effusions left base as well. A right chest tube is noted in the right lung base which may be partially kinked. There is a right IJ dialysis catheter. Stable abnormal chest with slightly improved ventilation of the right lung with persistent large right pleural effusion with infiltrates and atelectasis in the right lung base and to a lesser extent in the left base. Right chest tube which may be partially kinked. There is no pneumothorax. XR ABDOMEN FOR NG/OG/NE TUBE PLACEMENT    Result Date: 3/10/2023  EXAMINATION: ONE XRAY VIEW OF THE CHEST; ONE SUPINE XRAY VIEW(S) OF THE ABDOMEN 3/10/2023 5:49 am COMPARISON: 4 hours prior.  HISTORY: ORDERING SYSTEM PROVIDED HISTORY: ett placement TECHNOLOGIST PROVIDED HISTORY: Reason for exam:->ett placement; ORDERING SYSTEM PROVIDED HISTORY: Confirmation of course of NG/OG/NE tube and location of tip of tube TECHNOLOGIST PROVIDED HISTORY: Reason for exam:->Confirmation of course of NG/OG/NE tube and location of tip of tube Portable? ->Yes FINDINGS: Endotracheal tube tip with good positioning 3.7 cm above the juan francisco. Gastric tube with good position, proximal side hole beneath the hemidiaphragms. Right jugular hemodialysis catheter remains with good positioning. Obscuration of the right cardiac base and right hemidiaphragm. Improving aeration right base. Small right pleural effusion noted. No pneumothorax. Body wall soft tissues unremarkable. Osseous thorax intact. 1. Endotracheal tube and gastric tube with good positioning. 2. Right jugular hemodialysis catheter remains with good positioning. 3. Improved aeration of the right base with small right pleural effusion now evident. RECOMMENDATION: Careful clinical correlation and follow up recommended. IR ULTRASOUND GUIDANCE VASCULAR ACCESS    Result Date: 2/9/2023  PROCEDURE: IR FLUOROSCOPY GUIDED CENTRAL VENOUS ACCESS DEVICE PLACEMENT; US GUIDED VASCULAR ACCESS MODERATE CONSCIOUS SEDATION 2/9/2023 HISTORY: ORDERING SYSTEM PROVIDED HISTORY: Tunneled HD line along with separate iv access TECHNOLOGIST PROVIDED HISTORY: Reason for exam:->Tunneled HD line along with separate iv access TECHNIQUE: Ultrasound and fluoroscopic guided CONTRAST: None SEDATION: Moderate sedation was ordered and supervised by the attending with physician face-to-face monitoring. Medications were provided and recorded by Radiology nurses. The administration, documentation and monitoring of IV conscious sedation under my direct supervision for time frame of 20 minutes. 50 mcg of IV fentanyl was administered. Interventional radiology nursing staff monitored the patient the throughout the exam. FLUOROSCOPY DOSE AND TYPE: Radiation Exposure Indices: Fluoroscopy time equals 0.3 minutes.   Total dose equals 3.29 mGy DESCRIPTION OF PROCEDURE: Informed consent was obtained after a detailed explanation of the procedure including risks, benefits, and alternatives. Universal protocol was observed. Sterile gowns, masks, hats and gloves utilized for maximal sterile barrier. FINDINGS: The patient's neck was prepped and draped in a sterile fashion using maximum sterile barrier technique. Ultrasound images demonstrate a patent right internal jugular vein. Following the uneventful administration of lidocaine using ultrasound guidance I introduced a micro puncture needle into the right internal jugular vein. Through the micro needle a microwire was advanced. Over the microwire a micro sheath was placed. Through the micro sheath an Amplatz wire was advanced into the superior vena cava. 2 dilators were used to dilate a tract into the right internal jugular vein. I then anesthetized a tract along the chest wall measuring 10 cm. Using a blunt tunneling device I tunneled the catheter to the dilator within the right internal jugular vein. Over the Amplatz wire peel-away sheath was placed. Through the peel-away sheath the dual lumen 15 Sami 28 cm dialysis catheter was placed. The catheter tip is positioned at the SVC right atrial junction. The patient tolerated the procedure well and there were no complications. The catheter was then secured to the skin with 2-0 silk suture. The incision overlying the right internal jugular vein was also sutured with 2-0 silk suture. Successful placement of a tunneled dialysis catheter via the right internal jugular vein. Administration of conscious sedation.      IR GUIDED THORACENTESIS PLEURAL    Result Date: 3/10/2023  PROCEDURE: ULTRASOUNDGUIDED RIGHT THORACENTESIS 3/10/2023 HISTORY: ORDERING SYSTEM PROVIDED HISTORY: right pleural effusion/right thoracentesis, send labs please TECHNOLOGIST PROVIDED HISTORY: right pleural effusion/right thoracentesis, send labs please Reason for exam:->right pleural effusion/right thoracentesis, send labs please Which side should the procedure be performed?->Right TECHNIQUE: Informed consent was obtained after a detailed explanation of the procedure including risks, benefits, and alternatives. Universal protocol was performed. The right chest was prepped and draped in sterile fashion and local anesthesia was achieved with lidocaine. An 8 [place] needle sheath was advanced under ultrasound guidance into pleural effusion and thoracentesis was performed. The patient tolerated the procedure well. FINDINGS: A total of 1000 cc was removed. Successful ultrasound guided thoracentesis. IR GUIDED THORACENTESIS PLEURAL    Result Date: 2/14/2023  PROCEDURE: ULTRASOUNDGUIDED RIGHT THORACENTESIS 2/14/2023 HISTORY: ORDERING SYSTEM PROVIDED HISTORY: Pleural Effusion TECHNOLOGIST PROVIDED HISTORY: Reason for exam:->Pleural Effusion Which side should the procedure be performed?->Right TECHNIQUE: Informed consent was obtained after a detailed explanation of the procedure including risks, benefits, and alternatives. Universal protocol was performed. The right chest was prepped and draped in sterile fashion and local anesthesia was achieved with lidocaine. An 8 [place] needle sheath was advanced under ultrasound guidance into pleural effusion and thoracentesis was performed. The patient tolerated the procedure well. FINDINGS: A total of 1000 cc was removed. Successful ultrasound guided thoracentesis. IR GUIDED PERC PLEURAL DRAIN W CATH INSERT    Result Date: 2/14/2023  PROCEDURE: IR PERC CATH PLEURAL DRAIN W/IMAG 2/14/2023 HISTORY: ORDERING SYSTEM PROVIDED HISTORY: Recurrent pleural effusion TECHNOLOGIST PROVIDED HISTORY: Reason for exam:->Recurrent pleural effusion Which side should the procedure be performed?->Right TECHNIQUE: Ultrasound-guided CONTRAST: None SEDATION: None FLUOROSCOPY DOSE AND TYPE: None DESCRIPTION OF PROCEDURE: Informed consent was obtained after a detailed explanation of the procedure including risks, benefits, and alternatives. Universal protocol was observed. Sterile gowns, masks, hats and gloves utilized for maximal sterile barrier. FINDINGS: Ultrasound images of the right pleural space were obtained Note is made of a large right pleural effusion The patient has right back was prepped and draped in a sterile fashion maximum sterile barrier technique. Following the uneventful administration of lidocaine I introduced a 5 [place] Yueh catheter into the pleural space. I then anesthetized a 10 cm tract along the posterior chest wall. Using a blunt tunneling device I tunneled the PleurX catheter to the Yueh catheter. Through the [facility] catheter an Amplatz wire was advanced into the pleural space. Over the Amplatz wire multiple dilators were used to dilate a tract into the right pleural space. The PleurX catheter was then introduced into the peel-away sheath and position within the pleural space. The patient tolerated the procedure well and no complications. A sterile dressing was placed over the entry sites. 1. Successful placement of a PleurX catheter within the right pleural space 2. 1000 cc of fluid was aspirated. IR REMOVAL OF TUNNELED PLEURAL CATH W CUFF    Result Date: 2/23/2023  PROCEDURE: IR REMOVAL TUNNELED PLEURAL CATHETER 2/23/2023 HISTORY: ORDERING SYSTEM PROVIDED HISTORY: PleuRX Catheter removal TECHNOLOGIST PROVIDED HISTORY: Reason for exam:->PleuRX Catheter removal Which side should the procedure be performed?->Radiologist Recommendation TECHNIQUE: Surgical removal of the indwelling PleurX catheter CONTRAST: None SEDATION: None FLUOROSCOPY DOSE AND TYPE: None DESCRIPTION OF PROCEDURE: Informed consent was obtained after a detailed explanation of the procedure including risks, benefits, and alternatives. Universal protocol was observed. Sterile gowns, masks, hats and gloves utilized for maximal sterile barrier. FINDINGS: The patient's right side was prepped and draped in a sterile fashion maximum sterile barrier technique.   Following the uneventful administration of lidocaine I manual remove the indwelling PleurX catheter. A sterile dressing was placed over the insertion site. Successful removal of the indwelling right PleurX catheter. Imaging and labs were reviewed per medical records. Thank you for involving me in the care of Torie Benson I will continue to follow. Please do not hesitate to call [phone] for any questions or concerns.     Electronically signed by Siomara Perdomo MD on 3/13/2023 at 12:25 PM 5

## 2023-03-14 ENCOUNTER — APPOINTMENT (OUTPATIENT)
Dept: GENERAL RADIOLOGY | Age: 73
End: 2023-03-14
Payer: MEDICARE

## 2023-03-14 LAB
ANION GAP SERPL CALCULATED.3IONS-SCNC: 10 MMOL/L (ref 7–16)
ANISOCYTOSIS: ABNORMAL
BASOPHILS ABSOLUTE: 0 E9/L (ref 0–0.2)
BASOPHILS RELATIVE PERCENT: 0 % (ref 0–2)
BODY FLUID CULTURE, STERILE: NORMAL
BUN BLDV-MCNC: 29 MG/DL (ref 6–23)
CALCIUM SERPL-MCNC: 8.2 MG/DL (ref 8.6–10.2)
CHLORIDE BLD-SCNC: 97 MMOL/L (ref 98–107)
CO2: 28 MMOL/L (ref 22–29)
CREAT SERPL-MCNC: 1.7 MG/DL (ref 0.5–1)
EOSINOPHILS ABSOLUTE: 0 E9/L (ref 0.05–0.5)
EOSINOPHILS RELATIVE PERCENT: 0 % (ref 0–6)
GFR SERPL CREATININE-BSD FRML MDRD: 31 ML/MIN/1.73
GLUCOSE BLD-MCNC: 118 MG/DL (ref 74–99)
GRAM STAIN RESULT: NORMAL
HCT VFR BLD CALC: 28.9 % (ref 34–48)
HEMOGLOBIN: 7.8 G/DL (ref 11.5–15.5)
HYPOCHROMIA: ABNORMAL
IMMATURE GRANULOCYTES #: 0.09 E9/L
IMMATURE GRANULOCYTES %: 1.8 % (ref 0–5)
LACTIC ACID: 2.5 MMOL/L (ref 0.5–2.2)
LYMPHOCYTES ABSOLUTE: 0.7 E9/L (ref 1.5–4)
LYMPHOCYTES RELATIVE PERCENT: 13.8 % (ref 20–42)
MAGNESIUM: 1.8 MG/DL (ref 1.6–2.6)
MCH RBC QN AUTO: 25.6 PG (ref 26–35)
MCHC RBC AUTO-ENTMCNC: 27 % (ref 32–34.5)
MCV RBC AUTO: 94.8 FL (ref 80–99.9)
METER GLUCOSE: 162 MG/DL (ref 74–99)
METER GLUCOSE: 164 MG/DL (ref 74–99)
METER GLUCOSE: 169 MG/DL (ref 74–99)
METER GLUCOSE: 173 MG/DL (ref 74–99)
MONOCYTES ABSOLUTE: 0.26 E9/L (ref 0.1–0.95)
MONOCYTES RELATIVE PERCENT: 5.1 % (ref 2–12)
NEUTROPHILS ABSOLUTE: 4.03 E9/L (ref 1.8–7.3)
NEUTROPHILS RELATIVE PERCENT: 79.3 % (ref 43–80)
OVALOCYTES: ABNORMAL
PDW BLD-RTO: 18.9 FL (ref 11.5–15)
PHOSPHORUS: 2.5 MG/DL (ref 2.5–4.5)
PLATELET # BLD: 82 E9/L (ref 130–450)
PLATELET CONFIRMATION: NORMAL
PMV BLD AUTO: 11.8 FL (ref 7–12)
POIKILOCYTES: ABNORMAL
POLYCHROMASIA: ABNORMAL
POTASSIUM SERPL-SCNC: 3.9 MMOL/L (ref 3.5–5)
RBC # BLD: 3.05 E12/L (ref 3.5–5.5)
REPORT: NORMAL
SODIUM BLD-SCNC: 135 MMOL/L (ref 132–146)
TEAR DROP CELLS: ABNORMAL
THIS TEST SENT TO: NORMAL
TSH SERPL DL<=0.05 MIU/L-ACNC: 12.52 UIU/ML (ref 0.27–4.2)
WBC # BLD: 5.1 E9/L (ref 4.5–11.5)

## 2023-03-14 PROCEDURE — 84443 ASSAY THYROID STIM HORMONE: CPT

## 2023-03-14 PROCEDURE — 6370000000 HC RX 637 (ALT 250 FOR IP): Performed by: INTERNAL MEDICINE

## 2023-03-14 PROCEDURE — 2700000000 HC OXYGEN THERAPY PER DAY

## 2023-03-14 PROCEDURE — 36415 COLL VENOUS BLD VENIPUNCTURE: CPT

## 2023-03-14 PROCEDURE — 6360000002 HC RX W HCPCS: Performed by: SPECIALIST

## 2023-03-14 PROCEDURE — 2060000000 HC ICU INTERMEDIATE R&B

## 2023-03-14 PROCEDURE — 6360000002 HC RX W HCPCS

## 2023-03-14 PROCEDURE — 2580000003 HC RX 258: Performed by: INTERNAL MEDICINE

## 2023-03-14 PROCEDURE — 6360000002 HC RX W HCPCS: Performed by: INTERNAL MEDICINE

## 2023-03-14 PROCEDURE — 84100 ASSAY OF PHOSPHORUS: CPT

## 2023-03-14 PROCEDURE — 83735 ASSAY OF MAGNESIUM: CPT

## 2023-03-14 PROCEDURE — 83605 ASSAY OF LACTIC ACID: CPT

## 2023-03-14 PROCEDURE — C9113 INJ PANTOPRAZOLE SODIUM, VIA: HCPCS

## 2023-03-14 PROCEDURE — 2580000003 HC RX 258

## 2023-03-14 PROCEDURE — 94640 AIRWAY INHALATION TREATMENT: CPT

## 2023-03-14 PROCEDURE — 6370000000 HC RX 637 (ALT 250 FOR IP)

## 2023-03-14 PROCEDURE — 71045 X-RAY EXAM CHEST 1 VIEW: CPT

## 2023-03-14 PROCEDURE — 80048 BASIC METABOLIC PNL TOTAL CA: CPT

## 2023-03-14 PROCEDURE — 94660 CPAP INITIATION&MGMT: CPT

## 2023-03-14 PROCEDURE — 97530 THERAPEUTIC ACTIVITIES: CPT

## 2023-03-14 PROCEDURE — A4216 STERILE WATER/SALINE, 10 ML: HCPCS

## 2023-03-14 PROCEDURE — 82962 GLUCOSE BLOOD TEST: CPT

## 2023-03-14 PROCEDURE — 99232 SBSQ HOSP IP/OBS MODERATE 35: CPT | Performed by: INTERNAL MEDICINE

## 2023-03-14 PROCEDURE — 85025 COMPLETE CBC W/AUTO DIFF WBC: CPT

## 2023-03-14 RX ADMIN — INSULIN GLARGINE 8 UNITS: 100 INJECTION, SOLUTION SUBCUTANEOUS at 21:04

## 2023-03-14 RX ADMIN — BUDESONIDE 500 MCG: 0.5 SUSPENSION RESPIRATORY (INHALATION) at 04:45

## 2023-03-14 RX ADMIN — ROPINIROLE HYDROCHLORIDE 1 MG: 1 TABLET, FILM COATED ORAL at 13:56

## 2023-03-14 RX ADMIN — CARBIDOPA AND LEVODOPA 2 TABLET: 25; 100 TABLET, EXTENDED RELEASE ORAL at 21:06

## 2023-03-14 RX ADMIN — QUETIAPINE FUMARATE 25 MG: 25 TABLET ORAL at 21:06

## 2023-03-14 RX ADMIN — METOPROLOL TARTRATE 25 MG: 25 TABLET, FILM COATED ORAL at 09:10

## 2023-03-14 RX ADMIN — IPRATROPIUM BROMIDE 0.5 MG: 0.5 SOLUTION RESPIRATORY (INHALATION) at 14:27

## 2023-03-14 RX ADMIN — PROMETHAZINE HYDROCHLORIDE 12.5 MG: 25 TABLET ORAL at 10:10

## 2023-03-14 RX ADMIN — CARBIDOPA AND LEVODOPA 2 TABLET: 25; 100 TABLET, EXTENDED RELEASE ORAL at 15:27

## 2023-03-14 RX ADMIN — ALBUTEROL SULFATE 2.5 MG: 2.5 SOLUTION RESPIRATORY (INHALATION) at 14:27

## 2023-03-14 RX ADMIN — METOPROLOL TARTRATE 25 MG: 25 TABLET, FILM COATED ORAL at 22:59

## 2023-03-14 RX ADMIN — CEFEPIME 1000 MG: 1 INJECTION, POWDER, FOR SOLUTION INTRAMUSCULAR; INTRAVENOUS at 03:04

## 2023-03-14 RX ADMIN — Medication 10 ML: at 21:25

## 2023-03-14 RX ADMIN — CEFEPIME 1000 MG: 1 INJECTION, POWDER, FOR SOLUTION INTRAMUSCULAR; INTRAVENOUS at 15:01

## 2023-03-14 RX ADMIN — METOPROLOL TARTRATE 25 MG: 25 TABLET, FILM COATED ORAL at 21:06

## 2023-03-14 RX ADMIN — BUDESONIDE 500 MCG: 0.5 SUSPENSION RESPIRATORY (INHALATION) at 18:12

## 2023-03-14 RX ADMIN — IPRATROPIUM BROMIDE 0.5 MG: 0.5 SOLUTION RESPIRATORY (INHALATION) at 04:45

## 2023-03-14 RX ADMIN — ALBUTEROL SULFATE 2.5 MG: 2.5 SOLUTION RESPIRATORY (INHALATION) at 09:13

## 2023-03-14 RX ADMIN — Medication 10 ML: at 09:10

## 2023-03-14 RX ADMIN — ALBUTEROL SULFATE 2.5 MG: 2.5 SOLUTION RESPIRATORY (INHALATION) at 18:12

## 2023-03-14 RX ADMIN — INSULIN GLARGINE 8 UNITS: 100 INJECTION, SOLUTION SUBCUTANEOUS at 09:20

## 2023-03-14 RX ADMIN — FLUCONAZOLE IN SODIUM CHLORIDE 200 MG: 2 INJECTION, SOLUTION INTRAVENOUS at 09:19

## 2023-03-14 RX ADMIN — ALBUTEROL SULFATE 2.5 MG: 2.5 SOLUTION RESPIRATORY (INHALATION) at 04:45

## 2023-03-14 RX ADMIN — SERTRALINE 50 MG: 50 TABLET, FILM COATED ORAL at 21:06

## 2023-03-14 RX ADMIN — ROPINIROLE HYDROCHLORIDE 1 MG: 1 TABLET, FILM COATED ORAL at 09:10

## 2023-03-14 RX ADMIN — ROPINIROLE HYDROCHLORIDE 1 MG: 1 TABLET, FILM COATED ORAL at 21:05

## 2023-03-14 RX ADMIN — IPRATROPIUM BROMIDE 0.5 MG: 0.5 SOLUTION RESPIRATORY (INHALATION) at 09:13

## 2023-03-14 RX ADMIN — IPRATROPIUM BROMIDE 0.5 MG: 0.5 SOLUTION RESPIRATORY (INHALATION) at 18:12

## 2023-03-14 RX ADMIN — PREDNISONE 5 MG: 5 TABLET ORAL at 09:09

## 2023-03-14 RX ADMIN — CARBIDOPA AND LEVODOPA 2 TABLET: 25; 100 TABLET, EXTENDED RELEASE ORAL at 09:08

## 2023-03-14 RX ADMIN — LEVOTHYROXINE SODIUM 175 MCG: 0.05 TABLET ORAL at 05:43

## 2023-03-14 RX ADMIN — Medication 40 MG: at 17:32

## 2023-03-14 RX ADMIN — Medication 40 MG: at 05:45

## 2023-03-14 ASSESSMENT — PAIN SCALES - GENERAL: PAINLEVEL_OUTOF10: 0

## 2023-03-14 NOTE — CARE COORDINATION
3/14/2023 Isolation VRE. Bipap versus 1lnc. Iv Maxipime, Diflucan, Protonix. HD at [employer] [facility] [employer]  am- spoke to Friday [facility]. [facility]- has accepted per [employer]. PRECERT Needed, signed [place] ,Rapid covid test, Ezequiel Dahl will be from [address] [place] [address] where pt was previously. I updated Mary Jo at previous SNF, she will secure pts belongings at SNF and transfer the Ezequiel Dahl. CM updated her daughter Andrew Stone. Cm to follow. Possible transfer out of icu. Electronically signed by Sadiq Loaiza RN-BC on 3/14/2023 at 9:11 AM      The Plan for Transition of Care is related to the following treatment goals: SNF    The Patient and/or patient representative Daughter Andrew Stone was provided with a choice of provider and agrees   with the discharge plan. [x] Yes [] No    Freedom of choice list was provided with basic dialogue that supports the patient's individualized plan of care/goals, treatment preferences and shares the quality data associated with the providers.  [x] Yes [] No

## 2023-03-14 NOTE — PROGRESS NOTES
Department of Internal Medicine  General Internal Medicine  Attending Progress Note  Chief Complaint   Patient presents with    Hypotension     SUBJECTIVE:    More strength and energy today than yesterday. Reports that she is doing well. Denied fever and chills. No chest pain. No nausea or vomiting.     OBJECTIVE      Medications    Current Facility-Administered Medications: pantoprazole (PROTONIX) 40 mg in sodium chloride (PF) 0.9 % 10 mL injection, 40 mg, IntraVENous, Q12H  levothyroxine (SYNTHROID) tablet 175 mcg, 175 mcg, Oral, Daily  predniSONE (DELTASONE) tablet 5 mg, 5 mg, Oral, Daily  0.9 % sodium chloride infusion, , IntraVENous, PRN  phenylephrine (JIMENA-SYNEPHRINE) 50 mg in sodium chloride 0.9 % 250 mL infusion,  mcg/min, IntraVENous, Continuous  epoetin josé luis-epbx (RETACRIT) injection 3,000 Units, 3,000 Units, SubCUTAneous, Once per day on Mon Wed Fri  sertraline (ZOLOFT) tablet 50 mg, 50 mg, Oral, Nightly  rOPINIRole (REQUIP) tablet 1 mg, 1 mg, Oral, TID  carbidopa-levodopa (SINEMET CR)  MG per extended release tablet 2 tablet, 2 tablet, Oral, TID  metoprolol tartrate (LOPRESSOR) tablet 25 mg, 25 mg, Oral, BID  fluconazole (DIFLUCAN) in 0.9 % sodium chloride IVPB 200 mg, 200 mg, IntraVENous, Q24H  glucose chewable tablet 16 g, 4 tablet, Oral, PRN  dextrose bolus 10% 125 mL, 125 mL, IntraVENous, PRN **OR** dextrose bolus 10% 250 mL, 250 mL, IntraVENous, PRN  glucagon (rDNA) injection 1 mg, 1 mg, SubCUTAneous, PRN  dextrose 10 % infusion, , IntraVENous, Continuous PRN  [Held by provider] apixaban (ELIQUIS) tablet 5 mg, 5 mg, Oral, BID  insulin lispro (HUMALOG) injection vial 0-4 Units, 0-4 Units, SubCUTAneous, Nightly  insulin glargine (LANTUS) injection vial 8 Units, 8 Units, SubCUTAneous, BID  insulin lispro (HUMALOG) injection vial 0-8 Units, 0-8 Units, SubCUTAneous, TID WC  QUEtiapine (SEROQUEL) tablet 25 mg, 25 mg, Oral, Nightly  sodium chloride flush 0.9 % injection 10 mL, 10 mL, IntraVENous, 2 times per day  sodium chloride flush 0.9 % injection 10 mL, 10 mL, IntraVENous, PRN  0.9 % sodium chloride infusion, , IntraVENous, PRN  promethazine (PHENERGAN) tablet 12.5 mg, 12.5 mg, Oral, Q6H PRN **OR** ondansetron (ZOFRAN) injection 4 mg, 4 mg, IntraVENous, Q6H PRN  polyethylene glycol (GLYCOLAX) packet 17 g, 17 g, Oral, Daily PRN  acetaminophen (TYLENOL) tablet 650 mg, 650 mg, Oral, Q6H PRN **OR** acetaminophen (TYLENOL) suppository 650 mg, 650 mg, Rectal, Q6H PRN  DOPamine (INTROPIN) 800 mg in dextrose 5 % 250 mL infusion, 1-20 mcg/kg/min, IntraVENous, Continuous  budesonide (PULMICORT) nebulizer suspension 500 mcg, 0.5 mg, Nebulization, BID  albuterol (PROVENTIL) nebulizer solution 2.5 mg, 2.5 mg, Nebulization, Q4H WA **AND** ipratropium (ATROVENT) 0.02 % nebulizer solution 0.5 mg, 0.5 mg, Nebulization, Q4H WA  cefepime (MAXIPIME) 1,000 mg in sodium chloride 0.9 % 50 mL IVPB ([id number]), 1,000 mg, IntraVENous, Q12H  Physical    VITALS:  BP (!) 153/91   Pulse 90   Temp 98.5 °F (36.9 °C) (Axillary)   Resp 20   Ht 5' (1.524 m)   Wt 214 lb 9.6 oz (97.3 kg)   SpO2 94%   BMI 41.91 kg/m²   No acute distress moist membranes   Normocephalic, without obvious abnormality, atraumatic, external ears without lesions,   Neck supple no cervical lymphadenopathy  Heart has a regular rate and rhythm no murmur  Lungs are clear to auscultation bilaterally with equal movements. Abdomen is soft nontender nondistended no rebound or guarding. Anasarca  good peripheral perfusion. No significant rashes or new skin lesions. No new focality on neuro exam which is overall grossly intact.   Affect and mood seem to be appropriate     anasarca  Data    CBC:   Lab Results   Component Value Date/Time    WBC 5.1 03/14/2023 05:38 AM    RBC 3.05 03/14/2023 05:38 AM    HGB 7.8 03/14/2023 05:38 AM    HCT 28.9 03/14/2023 05:38 AM    MCV 94.8 03/14/2023 05:38 AM    MCH 25.6 03/14/2023 05:38 AM    MCHC 27.0 03/14/2023 05:38 AM    RDW 18.9 03/14/2023 05:38 AM    PLT 82 03/14/2023 05:38 AM    MPV 11.8 03/14/2023 05:38 AM     BMP:    Lab Results   Component Value Date/Time     03/14/2023 05:38 AM    K 3.9 03/14/2023 05:38 AM    K 3.6 02/20/2023 01:42 PM    CL 97 03/14/2023 05:38 AM    CO2 28 03/14/2023 05:38 AM    BUN 29 03/14/2023 05:38 AM    LABALBU 4.1 03/11/2023 04:21 AM    CREATININE 1.7 03/14/2023 05:38 AM    CALCIUM 8.2 03/14/2023 05:38 AM    GFRAA >60 10/16/2022 09:20 AM    LABGLOM 31 03/14/2023 05:38 AM    GLUCOSE 118 03/14/2023 05:38 AM       ASSESSMENT AND PLAN    Part of History of present illness from History and Physical:  presented with SOB, hypotension  for last few days prior to arrival to the hospital. SOB is associated with some cough, nonproductive but no fever or chills reported. Initially SOB was mild, intermittent but gradually getting more persistent. Started gradually. Exacerbated by general activity or exertion. Relieved only partially by rest. In ED patient was found to be hypotensive with Afib RVR and respiratory failure and was placed on BIPAP    Update: Admitted with altered mentation. 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 She was intubated, started on pressors and Solu-Cortef. She was extubated on 3/11. Gradual clinical improvement has been noted since admission. Patient is anemic and no source of bleeding has been found yet. 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 Continue to monitor  Tsh 3/12: 26.9; 3/13 11.6  Acute on chronic Anemia: CKD related. Transfused. stable  ESRD on HD: per Nephro  Atrial Fibrillation: poorly controlled this morning. Resume home medications  Thrombocytopenia: related to possible heparin used.  Added as allergy and started on eliquis on 3/11  DM type 2: insulin as ordered  UTI: positive for Enterococcus Cloacae: abx per ID: maxipime and diflucan  Acute on chronic respiratory failure with hypercapnia: continue supplemental oxygen and bipap per pulmonary  Pleural Effusion: overall stable. Thoracentesis was last admission. Congestive heart failure: stable. Continue current care. DVT prophylaxis: Lovenox  Full code.   Disposition: facility when ready depending on ID and fluid status

## 2023-03-14 NOTE — PROGRESS NOTES
Physical Therapy    Physical Therapy Treatment Note/Plan of Care    Room #:  7816/9708-45  Patient Name: Dena Severin  YOB: 1950  MRN: [id number]    Date of Service: 3/14/2023     Tentative placement recommendation: [facility] Chamberlain with Physical Therapy   Patient may be unsafe for ambulette due to impaired seated balance, sitting endurance  Equipment recommendation: Equipment at Nursing Home      Evaluating Physical Therapist: Jamee Haro  #[id number]      Specific Provider Orders/Date/Referring Provider :  03/11/23 1915    PT eval and treat  Start:  03/11/23 1915,   End:  03/11/23 1915,   ONE TIME,   Standing Count:  1 Occurrences,   R         Agata Wright, APRN - CNP     Admitting Diagnosis:   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]  Elevated troponin [R77.8]  Acute respiratory failure with hypercapnia (HCC) [J96.02]  Chronic renal failure, stage 4 (severe) (HCC) [N18.4]  Hypotension, unspecified hypotension type [I95.9]  Acute on chronic congestive heart failure, unspecified heart failure type ([facility] [I50.9]    Admitted with    low heart rate, hypotension and weakness, critical labs; uti, post blood transfusion  Intubated 3/10-3/11  S/p thoracentesis  Surgery: none  Visit Diagnoses         Codes    Chronic renal failure, stage 4 (severe) (HCC)     N18.4    Elevated troponin     R77.8    Postprocedural pneumothorax     J95.811            Patient Active Problem List   Diagnosis    Depression with anxiety    Osteoporosis    Asthma    Hyperlipidemia    Mitral regurgitation    Obstructive sleep apnea syndrome    Psoriasis    Diabetes mellitus ([facility]    Parkinson's disease ([facility]    Primary hypertension    Microalbuminuria    Morbid obesity ([facility]    RLS (restless legs syndrome)    Generalized weakness    Inability to walk    Hypothyroidism    Chest pain    Acute asthma exacerbation    Asthma exacerbation, mild    Paroxysmal atrial fibrillation (HCC)    Atrial fibrillation with rapid ventricular response ([facility]    Acute on chronic congestive heart failure ([address]    Coronary artery disease involving native coronary artery of native heart without angina pectoris    Dysphagia    Hepatosplenomegaly    Acute decompensated heart failure (HCC)    Acute diastolic (congestive) heart failure (HCC)    Nonrheumatic tricuspid valve regurgitation    Tobacco abuse    Recurrent syncope    Septic shock (HCC)    Seizure-like activity (HCC)    Acute kidney injury ([address]    Pancytopenia (HCC)    Thrombocytopenia (HCC)    Encephalopathy    Acute respiratory failure with hypoxia (HCC)    Lactic acidosis    Delirium    Acute on chronic anemia    Pleural effusion, right    Acute respiratory failure with hypoxia and hypercapnia (HCC)    Acute confusion    Acute on chronic diastolic heart failure (HCC)    Macrocytosis    Other specified anemias    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[facility]    Puerperal sepsis with acute hypoxic respiratory failure without septic shock (HCC)    Pulmonary HTN (HCC)    Chronic anticoagulation    RVF (right ventricular failure) (HCC)    Metabolic alkalosis    Sepsis (HCC)    COPD exacerbation (HCC)    Acute on chronic respiratory failure with hypercapnia (HCC)    Persistent atrial fibrillation (HCC)    Hypercapnic respiratory failure (HCC)    Acute on chronic respiratory failure (HCC)    Hyperkalemia    Heart failure (HCC)    Acute renal insufficiency    Hypotension    Anemia    Acute on chronic respiratory failure with hypoxia and hypercapnia (HCC)    Palliative care encounter    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 Decreased strength, balance and endurance  increases patient's risk for fall.  Increased heart rate while seated at the edge of bed with light activity (exercise). Initial posterior lean while seated at edge of bed, one patient was positioned correctly at edge of bed patient able to hold onto bilateral bed rails for support. Patient does have a fear of falling; educated patient on mariano stedy for sit to stands. PHYSICAL THERAPY  PLAN OF CARE       Physical therapy plan of care is established based on physician order,  patient diagnosis and clinical assessment    Current Treatment Recommendations:    -Bed Mobility: Lower extremity exercises , Upper extremity exercises , and Trunk control activities   -Sitting Balance: Incorporate reaching activities to activate trunk muscles , Hands on support to maintain midline , Facilitate active trunk muscle engagement , Facilitate postural control in all planes , and Engage in core activities to allow for movement within base of support   -Transfers: Provide instruction on proper hand and foot position for adequate transfer of weight onto lower extremities and use of gait device if needed and Cues for hand placement, technique and safety. Provide stabilization to prevent fall   -Endurance: Utilize Supervised activities to increase level of endurance to allow for safe functional mobility including transfers and gait     PT long term treatment goals are located in below grid    Patient and or family understand(s) diagnosis, prognosis, and plan of care. Frequency of treatments: Patient will be seen  3-5 times/week.          Prior Level of Function: Patient performing transfers in therapy per patient  Rehab Potential: fair   for baseline    Past medical history:   Past Medical History:   Diagnosis Date    A-fib ([facility]     Acute on chronic congestive heart failure (HCC)     Anxiety     Asthma     CAD (coronary artery disease) 01/21/2016    Cancer ([facility]  breast ca 2006    right lumpectomy    Chronic kidney disease     nephrolithiasis    COPD exacerbation ([facility] 10/12/2022 Depression     Diabetes mellitus ([facility]     H/O mammogram     Hemodialysis patient [facility])     Hx MRSA infection     toe infection january 2012    Hyperlipidemia     Hypertension     Lateral epicondylitis     Morbid obesity ([facility]     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 Milla Javed  01/11/2022    Dr. Chalo Birmingham  7/29/15    CT GUIDED CHEST TUBE  7/8/2022    CT GUIDED CHEST TUBE 7/8/2022 Ricardo Brady MD [facility]    ENDOSCOPY, COLON, DIAGNOSTIC  7/19/15    GALLBLADDER SURGERY      IR PERC CATH PLEURAL DRAIN W/IMAG  2/14/2023    IR PERC CATH PLEURAL DRAIN W/IMAG 2/14/2023 [facility]    IR REMOVAL OF TUNNELED PLEURAL CATH W CUFF  2/23/2023    IR REMOVAL OF TUNNELED PLEURAL CATH W CUFF 2/23/2023 [facility]    LUMBAR LAMINECTOMY      TOE AMPUTATION      TONSILLECTOMY      UPPER GASTROINTESTINAL ENDOSCOPY      UPPER GASTROINTESTINAL ENDOSCOPY N/A 7/19/2022    EGD ESOPHAGOGASTRODUODENOSCOPY performed by Riki Mcghee MD at [address]:    Precautions: Bedrest with bathroom Privileges , falls, alarm, O2, and contact isolation , vre, 2 Liters of o2 via nasal cannula   Femoral line    Social history: Patient lives in a skilled nursing facility      Equipment owned: Wheelchair, Wheeled Walker, and O2,       [address] - Inpatient   How much help is needed turning from your back to your side while in a flat bed without using bedrails?: A Lot  How much help is needed moving from lying on your back to sitting on the side of a flat bed without using bedrails?: A Lot  How much help is needed moving to and from a bed to a chair?: Total  How much help is needed standing up from a chair using your arms?: Total  How much help is needed walking in hospital room?: Total  How much help is needed climbing 3-5 steps with a railing?: Total  AM-PAC Inpatient Mobility Raw Score : 8  AM-PAC Inpatient T-Scale Score : 28.52  Mobility Inpatient CMS 0-100% Score: 86.62  Mobility Inpatient CMS G-Code Modifier : CM    Nursing cleared patient for PT treatment. OBJECTIVE;   Initial Evaluation  Date: 3/12/2023 Treatment Date:  3/14/2023     Short Term/ Long Term   Goals   Was pt agreeable to Eval/treatment? Yes Yes  To be met in 5 days   Pain level   0/10    0/10    Bed Mobility    Using rails and head of bed elevated    Rolling: Maximal assist of 1    Supine to sit: Maximal assist of  2    Sit to supine: Maximal assist of  2    Scooting: Maximal assist of  2   Rolling: Maximal assist of 1   Supine to sit: Maximal assist of 1   Sit to supine: Moderate assist of  2   Scooting: Maximal assist of 1 with chux at edge of bed and max of 2 towards head of bed. Rolling: Moderate assist of 1    Supine to sit: Moderate assist of 1    Sit to supine: Moderate assist of 1    Scooting: Moderate assist of 1     Transfers Sit to stand: Not assessed due to pt overall debility, decreased activity tolerance, balance deficits, safety and fall risk. not assessed due to patient's overall debility, decreased activity tolerance, balance deficits, safety and fall risk. Did educate patient on mariano stedy d/t patient fearful of falling. Sit to stand:  Moderate assist of  2 using mariano stedy   ROM Within functional limits        Strength BUE:   3+/5  RLE:  3-/5  LLE:  3-/5  Increase strength in affected mm groups by 1/3 grade   Balance Sitting EOB:  poor left posterior lean  Sitting EOB: fair posterior lean initially cues for hand placement         Sitting EOB:  fair        Patient is Alert & Oriented x person and situation and follows one step directions  Short term memory deficit   Sensation:  Patient  denies numbness/tingling   Edema:  yes bilateral lower extremities and bilateral upper extremities   Endurance: poor fatigues easily,     Vitals:  2 liters nasal cannula   Blood Pressure at rest  Blood Pressure during session    Heart Rate at rest  Heart Rate during session  with activity at edge of bed. SPO2 at rest %  SPO2 during session 97%     Patient education  Patient educated on role of Physical Therapy, risks of immobility, safety and plan of care, importance of positional changes for oxygen exchange,  importance of mobility while in hospital , and safety      Patient response to education:   Pt verbalized understanding Pt demonstrated skill Pt requires further education in this area   Yes Partial Yes      Treatment:  Patient practiced and was instructed/facilitated in the following treatment: Patient   Sat edge of bed 15 minutes with Moderate assist of 1 to increase dynamic sitting balance and activity tolerance. Initially due to posterior lean progressed to supervision. Patient with use of bed rails to hold onto. Performed seated exercise. Educated patient on [facility] stedy d/t declining to stand. Assisted back to supine and towards HOB. SCDs and prevalon boots applied and positioned for comfort. Therapeutic Exercises:  ankle pumps, heel raises, and long arc quad  x 15 reps. At end of session, patient in bed with alarm and  aid present  call light and phone within reach,  all lines and tubes intact, nursing notified. Patient would benefit from continued skilled Physical Therapy to improve functional independence and quality of life.          Patient's/ family goals   get stronger    Time in 329  Time out  356    Total Treatment Time  27 minutes      CPT codes:    Therapeutic activities (33605)   27 minutes  2 unit(s)    Tala President, MARIUM  DVD#[id number]

## 2023-03-14 NOTE — PROGRESS NOTES
Report called to PCU RN. Belongings transferred with patient to room 528. Chinedu Clinton notified of patient transfer to 5th floor. Questions answered.

## 2023-03-14 NOTE — PROGRESS NOTES
[facility] MD Terry Loaiza MD Ethelyn Lanius, MD Gerry Formosa, BRADLEY Devlin, NUNU Schumacher, BRADLEY  Progress Note    3/14/2023    SUBJECTIVE:   3/11: Seen in the ICU. Mechanically ventilated and sedated. FiO2 35 % PEEP 5. She opens eyes to voice, does not follow commands. Tolerated HD yesterday without complications. TSH improving. On dopamine infusion. 3/12: Seen in the ICU. Extubated yesterday, now on nasal canula. No acute complaints. Confused. Denies chest pain, dyspnea, or palpitations. BP low this morning with elevated heart rate. Hgb decreased from 10.2-->7.2. receiving 1 unit PRBC. Denies hematochezia. On amrit. 3/13: Seen in the ICU. Sitting up eating breakfast. Blood pressure has improved, off pressors. Mentation improving. 300 cc urine output yesterday. She is on nasal canula. CT abdomen negative for retroperitoneal hematoma. 3/14: Feeling better than yesterday. Seen while performing physical therapy. Energy level much improved, fatigue improving, still generally weak but making effort for improvement. O2 weaned to 1 L per nasal cannula. PROBLEM LIST:    Principal Problem:    Myxedema coma ([address]  Resolved Problems:    * No resolved hospital problems. *         DIET:    ADULT ORAL NUTRITION SUPPLEMENT; Breakfast, Lunch; Low Calorie/High Protein Oral Supplement  ADULT DIET; Dysphagia - Soft and Bite Sized; 4 carb choices (60 gm/meal); Low Sodium (2 gm); 1800 ml;  No Drinking Straws     MEDS (scheduled):    pantoprazole (PROTONIX) 40 mg injection  40 mg IntraVENous Q12H    levothyroxine  175 mcg Oral Daily    predniSONE  5 mg Oral Daily    epoetin josé luis-epbx  3,000 Units SubCUTAneous Once per day on Mon Wed Fri    sertraline  50 mg Oral Nightly    rOPINIRole  1 mg Oral TID    carbidopa-levodopa  2 tablet Oral TID    metoprolol tartrate  25 mg Oral BID    fluconazole  200 mg IntraVENous Q24H    [Held by provider] apixaban  5 mg Oral BID insulin lispro  0-4 Units SubCUTAneous Nightly    insulin glargine  8 Units SubCUTAneous BID    insulin lispro  0-8 Units SubCUTAneous TID WC    QUEtiapine  25 mg Oral Nightly    sodium chloride flush  10 mL IntraVENous 2 times per day    budesonide  0.5 mg Nebulization BID    albuterol  2.5 mg Nebulization Q4H WA    And    ipratropium  0.5 mg Nebulization Q4H WA    cefepime  1,000 mg IntraVENous Q12H       MEDS (infusions):   sodium chloride      phenylephrine (JIMENA-SYNEPHRINE) 50 mg/250 mL infusion 10 mcg/min (03/12/23 1508)    dextrose      sodium chloride Stopped (03/11/23 0921)    DOPamine Stopped (03/11/23 1743)       MEDS (prn):  sodium chloride, glucose, dextrose bolus **OR** dextrose bolus, glucagon (rDNA), dextrose, sodium chloride flush, sodium chloride, promethazine **OR** ondansetron, polyethylene glycol, acetaminophen **OR** acetaminophen    PHYSICAL EXAM:     Patient Vitals for the past 24 hrs:   BP Temp Temp src Pulse Resp SpO2 Weight   03/14/23 1428 (!) 119/46 97.3 °F (36.3 °C) Axillary 100 19 100 % --   03/14/23 1427 -- -- -- -- -- 100 % --   03/14/23 1200 (!) 153/91 -- -- 90 20 94 % --   03/14/23 1100 (!) 157/78 -- -- 86 21 94 % --   03/14/23 1000 (!) 162/87 -- -- 95 25 -- --   03/14/23 0900 126/78 -- -- 79 23 98 % --   03/14/23 0800 (!) 153/87 98.5 °F (36.9 °C) Axillary 84 17 97 % --   03/14/23 0700 -- -- -- -- -- 95 % --   03/14/23 0600 (!) 147/88 -- -- 80 18 100 % --   03/14/23 0500 130/73 -- -- 81 19 100 % 214 lb 9.6 oz (97.3 kg)   03/14/23 0445 132/77 -- -- 77 18 100 % --   03/14/23 0400 130/71 97.5 °F (36.4 °C) Axillary 76 22 99 % --   03/14/23 0300 130/71 -- -- 72 22 100 % --   03/14/23 0200 135/76 -- -- 91 22 99 % --   03/14/23 0119 -- -- -- -- 18 -- --   03/14/23 0100 (!) 143/72 -- -- 86 18 99 % --   03/14/23 0000 (!) 157/75 97.7 °F (36.5 °C) Axillary 82 17 99 % --   03/13/23 2300 (!) 159/76 -- -- 92 22 100 % --   03/13/23 2200 (!) 161/77 -- -- 93 24 90 % --   03/13/23 2100 (!) 148/84 -- -- (!) 101 23 -- --   03/13/23 2000 131/62 97.6 °F (36.4 °C) Oral 95 16 97 % --   03/13/23 1800 133/67 -- -- 92 18 98 % --   03/13/23 1735 113/67 97.9 °F (36.6 °C) -- (!) 155 17 (!) 64 % 212 lb 15.4 oz (96.6 kg)   03/13/23 1700 (!) 83/58 -- -- 78 16 100 % --   03/13/23 1645 88/61 -- -- 82 -- -- --   03/13/23 1630 104/64 -- -- 79 -- -- --   @      Intake/Output Summary (Last 24 hours) at 3/14/2023 1619  Last data filed at 3/14/2023 1350  Gross per 24 hour   Intake 1130.11 ml   Output 3350 ml   Net -2219.89 ml         Wt Readings from Last 3 Encounters:   03/14/23 214 lb 9.6 oz (97.3 kg)   02/24/23 214 lb 15.2 oz (97.5 kg)   01/16/23 259 lb (117.5 kg)       Constitutional: in no acute distress, confused   HEENT: NC/AT, EOMI, sclera and conjunctiva are clear and anicteric, mucus membranes moist.  Neck: Trachea midline, no JVD. RIJ tesio  Cardiovascular: S1, S2 regular rhythm, no murmur,or rub  Respiratory:  CTAB. No crackles, no wheeze  Gastrointestinal:  Soft, nontender, distended, abdomen and abdominal panus swelling has improved (+1) NABS  Ext: trace dependent edema with skin wrinkling, feet warm  Skin: dry, no rash  Neuro: slight confusion       DATA:    Recent Labs     03/12/23  0434 03/12/23  1149 03/13/23  0408 03/14/23  0538   WBC 4.0*  --  5.0 5.1   HGB 7.2* 9.2* 8.1* 7.8*   HCT 25.7* 32.3* 31.6* 28.9*   MCV 89.5  --  95.8 94.8   *  --  87* 82*     Recent Labs     03/12/23  0434 03/13/23  0408 03/14/23  0538    139 135   K 3.0* 4.2 3.9   CL 97* 99 97*   CO2 29 27 28   MG 1.7 1.8 1.8   PHOS 2.9 3.7 2.5   BUN 33* 46* 29*   CREATININE 2.5* 2.7* 1.7*       Lab Results   Component Value Date    LABPROT 0.5 (H) 01/18/2023    LABPROT 0.5 01/18/2023          Assessment  Acute on chronic kidney disease requiring initiation of hemodialysis during a recent hospitalization. Hemodialysis on Mondays, Wednesdays, and Fridays.    Chronic kidney disease stage III, baseline creatinine 1.4-1.7 mg/dL secondary to diabetic nephropathy and renal microvascular atherosclerotic disease. 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     Recommendations  Continue IHD support for solute and volume clearance on MWF  Dialysis today  Ultrafiltration as warranted  Iron panel, normal   Continue Retacrit   10 mg midodrine on dialysis days if hypotensive   Fu blood cutlures   Follow labs  Monitor I&O  Continue intensive supportive care        Electronically signed by Rony Mccarty MD on 3/14/2023 at 4:19 PM

## 2023-03-14 NOTE — PROGRESS NOTES
Pulmonary/Critical Care Progress Note    We are following patient for hypothyroidism (corrected), myxedema coma, recurrent bilateral pleural effusions stable, diastolic dysfunction, CKD on dialysis, morbid obesity (improved), alveolar hypoventilation, obstructive sleep apnea, atrial fibrillation on apixaban, Parkinson's disease    SUBJECTIVE:  Patient is bright and in an excellent frame of mind. She is feeling much better and is very happy to be improving. She is eating well and taking her pills without difficulty. She was dialyzed yesterday without incident. However, hemoglobin is slowly dropping possibly related to her anticoagulation. We will hold the apixaban and follow hemoglobin carefully. She continues on cefepime to treat her Enterobacter cloacae complex pneumonia. She continues to tolerate oral thyroxine and oral prednisone. We will continue IV Protonix. Midodrine can be stopped.     MEDICATIONS:   pantoprazole (PROTONIX) 40 mg injection  40 mg IntraVENous Q12H    levothyroxine  175 mcg Oral Daily    predniSONE  5 mg Oral Daily    epoetin josé luis-epbx  3,000 Units SubCUTAneous Once per day on Mon Wed Fri    sertraline  50 mg Oral Nightly    rOPINIRole  1 mg Oral TID    carbidopa-levodopa  2 tablet Oral TID    metoprolol tartrate  25 mg Oral BID    fluconazole  200 mg IntraVENous Q24H    apixaban  5 mg Oral BID    insulin lispro  0-4 Units SubCUTAneous Nightly    insulin glargine  8 Units SubCUTAneous BID    insulin lispro  0-8 Units SubCUTAneous TID     QUEtiapine  25 mg Oral Nightly    sodium chloride flush  10 mL IntraVENous 2 times per day    budesonide  0.5 mg Nebulization BID    albuterol  2.5 mg Nebulization Q4H WA    And    ipratropium  0.5 mg Nebulization Q4H WA    cefepime  1,000 mg IntraVENous Q12H    midodrine  10 mg Per NG tube Q MWF      sodium chloride      phenylephrine (JIMENA-SYNEPHRINE) 50 mg/250 mL infusion 10 mcg/min (03/12/23 6973)    dextrose      sodium chloride Stopped (03/11/23 6816)    DOPamine Stopped (03/11/23 1743)     sodium chloride, glucose, dextrose bolus **OR** dextrose bolus, glucagon (rDNA), dextrose, sodium chloride flush, sodium chloride, promethazine **OR** ondansetron, polyethylene glycol, acetaminophen **OR** acetaminophen      REVIEW OF SYSTEMS:  Constitutional: Denies fever, weight loss, night sweats, and fatigue  Skin: Denies pigmentation, dark lesions, and rashes   HEENT: Denies hearing loss, tinnitus, ear drainage, epistaxis, sore throat, and hoarseness. Cardiovascular: Denies palpitations, chest pain, and chest pressure. Respiratory: Denies cough, dyspnea at rest, hemoptysis, apnea, and choking. Gastrointestinal: Denies nausea, vomiting, poor appetite, diarrhea, heartburn or reflux  Genitourinary: Denies dysuria, frequency, urgency or hematuria  Musculoskeletal: Denies myalgias, muscle weakness, and bone pain  Neurological: Denies dizziness, vertigo, headache, and focal weakness  Psychological: Denies anxiety and depression  Endocrine: Denies heat intolerance and cold intolerance  Hematopoietic/Lymphatic: Denies bleeding problems and blood transfusions    OBJECTIVE:  Vitals:    03/14/23 0700   BP:    Pulse:    Resp:    Temp:    SpO2: 95%     FiO2 : 30 %  O2 Flow Rate (L/min): 1 L/min  O2 Device: Nasal cannula    PHYSICAL EXAM:  Constitutional: No fever, chills, diaphoresis  Skin: No skin rash, no skin breakdown  HEENT: Unremarkable; mucous membranes are moist  Neck: No JVD, lymphadenopathy, thyromegaly  Cardiovascular: S1, S2 irregular. No S3 or rubs present  Respiratory: Few crackles bilateral lower lung fields, unchanged  Gastrointestinal: Soft, obese, nontender  Genitourinary: CVA tenderness  Extremities: No clubbing, cyanosis, minimal trace edema at ankles  Neurological: A, alert, oriented x3. No obvious tremor. No obvious focal motor deficits  Psychological: Good spirits.   Appropriate affect    LABS:  WBC   Date Value Ref Range Status   03/14/2023 5.1 4.5 - 11.5 E9/L Final   03/13/2023 5.0 4.5 - 11.5 E9/L Final   03/12/2023 4.0 (L) 4.5 - 11.5 E9/L Final     Hemoglobin   Date Value Ref Range Status   03/14/2023 7.8 (L) 11.5 - 15.5 g/dL Final   03/13/2023 8.1 (L) 11.5 - 15.5 g/dL Final   03/12/2023 9.2 (L) 11.5 - 15.5 g/dL Final     Hematocrit   Date Value Ref Range Status   03/14/2023 28.9 (L) 34.0 - 48.0 % Final   03/13/2023 31.6 (L) 34.0 - 48.0 % Final   03/12/2023 32.3 (L) 34.0 - 48.0 % Final     MCV   Date Value Ref Range Status   03/14/2023 94.8 80.0 - 99.9 fL Final   03/13/2023 95.8 80.0 - 99.9 fL Final   03/12/2023 89.5 80.0 - 99.9 fL Final     Platelets   Date Value Ref Range Status   03/14/2023 82 (L) 130 - 450 E9/L Final   03/13/2023 87 (L) 130 - 450 E9/L Final   03/12/2023 108 (L) 130 - 450 E9/L Final     Sodium   Date Value Ref Range Status   03/14/2023 135 132 - 146 mmol/L Final   03/13/2023 139 132 - 146 mmol/L Final   03/12/2023 137 132 - 146 mmol/L Final     Potassium   Date Value Ref Range Status   03/14/2023 3.9 3.5 - 5.0 mmol/L Final   03/13/2023 4.2 3.5 - 5.0 mmol/L Final   03/12/2023 3.0 (L) 3.5 - 5.0 mmol/L Final     Potassium reflex Magnesium   Date Value Ref Range Status   02/20/2023 3.6 3.5 - 5.0 mmol/L Final   02/19/2023 4.0 3.5 - 5.0 mmol/L Final   02/18/2023 3.7 3.5 - 5.0 mmol/L Final     Chloride   Date Value Ref Range Status   03/14/2023 97 (L) 98 - 107 mmol/L Final   03/13/2023 99 98 - 107 mmol/L Final   03/12/2023 97 (L) 98 - 107 mmol/L Final     CO2   Date Value Ref Range Status   03/14/2023 28 22 - 29 mmol/L Final   03/13/2023 27 22 - 29 mmol/L Final   03/12/2023 29 22 - 29 mmol/L Final     BUN   Date Value Ref Range Status   03/14/2023 29 (H) 6 - 23 mg/dL Final   03/13/2023 46 (H) 6 - 23 mg/dL Final   03/12/2023 33 (H) 6 - 23 mg/dL Final     Creatinine   Date Value Ref Range Status   03/14/2023 1.7 (H) 0.5 - 1.0 mg/dL Final   03/13/2023 2.7 (H) 0.5 - 1.0 mg/dL Final   03/12/2023 2.5 (H) 0.5 - 1.0 mg/dL Final     Glucose Date Value Ref Range Status   03/14/2023 118 (H) 74 - 99 mg/dL Final   03/13/2023 160 (H) 74 - 99 mg/dL Final   03/12/2023 129 (H) 74 - 99 mg/dL Final     Calcium   Date Value Ref Range Status   03/14/2023 8.2 (L) 8.6 - 10.2 mg/dL Final   03/13/2023 8.4 (L) 8.6 - 10.2 mg/dL Final   03/12/2023 8.4 (L) 8.6 - 10.2 mg/dL Final     Total Protein   Date Value Ref Range Status   03/11/2023 6.5 6.4 - 8.3 g/dL Final   03/11/2023 6.4 6.4 - 8.3 g/dL Final   03/10/2023 6.0 (L) 6.4 - 8.3 g/dL Final     Albumin   Date Value Ref Range Status   03/11/2023 4.1 3.5 - 5.2 g/dL Final   03/10/2023 3.8 3.5 - 5.2 g/dL Final   02/20/2023 4.6 3.5 - 5.2 g/dL Final     Total Bilirubin   Date Value Ref Range Status   03/11/2023 1.0 0.0 - 1.2 mg/dL Final   03/10/2023 0.5 0.0 - 1.2 mg/dL Final   02/20/2023 0.7 0.0 - 1.2 mg/dL Final     Alkaline Phosphatase   Date Value Ref Range Status   03/11/2023 94 35 - 104 U/L Final   03/10/2023 81 35 - 104 U/L Final   02/20/2023 53 35 - 104 U/L Final     AST   Date Value Ref Range Status   03/11/2023 7 0 - 31 U/L Final   03/10/2023 6 0 - 31 U/L Final   02/20/2023 7 0 - 31 U/L Final     ALT   Date Value Ref Range Status   03/11/2023 <5 0 - 32 U/L Final   03/10/2023 <5 0 - 32 U/L Final   02/20/2023 <5 0 - 32 U/L Final     Est, Glom Filt Rate   Date Value Ref Range Status   03/14/2023 31 >=60 mL/min/1.73 Final     Comment:     Pediatric calculator link  [email or web address]/gfr_calculatorped  Effective Oct 3, 2022  These results are not intended for use in patients  <25years of age. eGFR results are calculated without  a race factor using the 2021 CKD-EPI equation. Careful  clinical correlation is recommended, particularly when  comparing to results calculated using previous equations.   The CKD-EPI equation is less accurate in patients with  extremes of muscle mass, extra-renal metabolism of  creatinine, excessive creatinine ingestion, or following  therapy that affects renal tubular secretion. 03/13/2023 18 >=60 mL/min/1.73 Final     Comment:     Pediatric calculator link  [email or web address]. [email or web address]/gfr_calculatorped  Effective Oct 3, 2022  These results are not intended for use in patients  <25years of age. eGFR results are calculated without  a race factor using the 2021 CKD-EPI equation. Careful  clinical correlation is recommended, particularly when  comparing to results calculated using previous equations. The CKD-EPI equation is less accurate in patients with  extremes of muscle mass, extra-renal metabolism of  creatinine, excessive creatinine ingestion, or following  therapy that affects renal tubular secretion. 03/12/2023 20 >=60 mL/min/1.73 Final     Comment:     Pediatric calculator link  [email or web address]. [email or web address]/gfr_calculatorped  Effective Oct 3, 2022  These results are not intended for use in patients  <25years of age. eGFR results are calculated without  a race factor using the 2021 CKD-EPI equation. Careful  clinical correlation is recommended, particularly when  comparing to results calculated using previous equations. The CKD-EPI equation is less accurate in patients with  extremes of muscle mass, extra-renal metabolism of  creatinine, excessive creatinine ingestion, or following  therapy that affects renal tubular secretion.        GFR    Date Value Ref Range Status   10/16/2022 >60  Final   10/14/2022 59  Final   10/13/2022 >60  Final     Magnesium   Date Value Ref Range Status   03/14/2023 1.8 1.6 - 2.6 mg/dL Final   03/13/2023 1.8 1.6 - 2.6 mg/dL Final   03/12/2023 1.7 1.6 - 2.6 mg/dL Final     Phosphorus   Date Value Ref Range Status   03/14/2023 2.5 2.5 - 4.5 mg/dL Final   03/13/2023 3.7 2.5 - 4.5 mg/dL Final   03/12/2023 2.9 2.5 - 4.5 mg/dL Final     Recent Labs     03/12/23  0531   PH 7.439   PO2 93.4   PCO2 43.5   HCO3 28.8*   BE 4.2*   O2SAT 97.5       RADIOLOGY:  XR CHEST PORTABLE   Final Result   Unchanged right pleural effusion with stable likely adjacent atelectasis.         CT ABDOMEN PELVIS WO CONTRAST Additional Contrast? None   Final Result   Bilateral pleural effusions with small amount of fluid throughout the upper   abdomen.  Anasarca seen within the soft tissues with no evidence of acute   intra-abdominopelvic abnormality.  No evidence of a retroperitoneal hematoma   or bleed.         XR CHEST PORTABLE   Final Result   Slightly worsening aeration on the right with slightly improving aeration on   the left.  Suspected underlying pleural effusions, atelectasis and or   infiltrate.  Suspected pericardial effusion as before.         XR CHEST PORTABLE   Final Result   Trace right pleural effusion or thickening.      Mild interstitial prominence, chronic changes versus component of edema or   atypical infection.      Bibasilar subsegmental atelectasis.      Mild cardiomegaly.         CT CHEST WO CONTRAST   Final Result   1. Moderate right and small left pleural effusions with associated   compressive atelectasis.   2. Elevation of the right hemidiaphragm with underlying moderate perihepatic   ascites.   3. Moderate cardiomegaly and small pericardial effusion.   4. Endotracheal tube, gastric tube, and right jugular tunneled implanted   hemodialysis catheter with good positioning.      RECOMMENDATIONS:   Careful clinical correlation and follow up recommended.         IR GUIDED THORACENTESIS PLEURAL   Final Result   Successful ultrasound guided thoracentesis.         XR ABDOMEN FOR NG/OG/NE TUBE PLACEMENT   Final Result   1. Endotracheal tube and gastric tube with good positioning.   2. Right jugular hemodialysis catheter remains with good positioning.   3. Improved aeration of the right base with small right pleural effusion now   evident.      RECOMMENDATION:   Careful clinical correlation and follow up recommended.         XR CHEST PORTABLE   Final Result   1. Endotracheal tube and gastric tube with  good positioning.   2. Right jugular hemodialysis catheter remains with good positioning.   3. Improved aeration of the right base with small right pleural effusion now   evident.      RECOMMENDATION:   Careful clinical correlation and follow up recommended.         XR CHEST PORTABLE   Final Result   Mild pulmonary vascular congestion.      Interval worsening of right upper lobe and right middle lobe atelectasis.      Persistent right lower lobe atelectasis and/or pneumonitis.      Small right pleural effusion.                 PROBLEM LIST:  Principal Problem:    Myxedema coma (HCC)  Resolved Problems:    * No resolved hospital problems. *      IMPRESSION:  Myxedema coma, resolved  Hypothyroidism, treated  Right and left small pleural effusions, stable  Probable diastolic dysfunction  CKD on dialysis  Obstructive sleep apnea wearing BiPAP at night  Atrial fibrillation  Urinary tract infection with Enterobacter cloacae complex on cefepime (sensitive)  Slow drop in hemoglobin/hematocrit on Protonix    PLAN:  Hold apixaban  Discontinue midodrine since blood pressure is approximately 140/85  Metoprolol for rate control  Dialysis per nephrology  Aerosolized bronchodilators  PT and OT  Consider transfer out of ICU  Reduce steroid dose    ATTESTATION:  ICU Staff Physician note of personal involvement in Care  As the attending physician, I certify that I personally reviewed the patient’s history and personally examined the patient to confirm the physical findings described above,  And that I reviewed the relevant imaging studies and available reports.  I also discussed the differential diagnosis and all of the proposed management plans with the patient and individuals accompanying the patient to this visit.  They had the opportunity to ask questions about the proposed management plans and to have those questions answered.     This patient has a high probability of sudden, clinically significant deterioration, which requires  the highest level of physician preparedness to intervene urgently. I managed/supervised life or organ supporting interventions that required frequent physician assessment. I devoted my full attention to the direct care of this patient for the amount of time indicated below. Time I spent with the family or surrogate(s) is included only if the patient was incapable of providing the necessary information or participating in medical decisions - Time devoted to teaching and to any procedures I billed separately is not included.     CRITICAL CARE TIME:  32 minutes    Electronically signed by Adri Abel MD on 3/14/2023 at 9:47 AM

## 2023-03-14 NOTE — PROGRESS NOTES
NAME: Janneth Smith  MR:  [id number]  :   1950  Admit Date:  3/9/2023      This is a face to face encounter with Janneth Smith    Elements of this note, including Diagnosis,  Interval History, Past Medical/Surgical/Family/Social Histories, ROS, physical exam, and Assessment and Plan were copied and pasted from Previous. Updates have been made where noted and reflect current exam and medical decision making from the DOS of this encounter.  CHIEF COMPLAINT     ID following for   Chief Complaint   Patient presents with    Hypotension     HISTORY OF PRESENT ILLNESS     Janneth Smith is a 72 y.o. female who presents with   Chief Complaint   Patient presents with    Hypotension     3/9/2023 and has  has a past medical history of A-fib ([facility]), Acute on chronic congestive heart failure ([facility]), Anxiety, Asthma, CAD (coronary artery disease), Cancer ([facility]), Chronic kidney disease, COPD exacerbation ([facility]), Depression, Diabetes mellitus ([facility]), H/O mammogram, Hemodialysis patient ([facility]), Hx MRSA infection, Hyperlipidemia, Hypertension, Lateral epicondylitis, Morbid obesity ([facility]), 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     Pt seen and examined 23  In icu   Extubated 3/11      Afebrile on  1L cr1.7 wbc5.1 plt 82         No reported adverse drug reactions.  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]  Elevated troponin [R77.8]  Acute respiratory failure with hypercapnia ([facility]) [J96.02]  Chronic renal failure, stage 4 (severe) ([facility]) [N18.4]  Hypotension, unspecified hypotension type [I95.9]  Acute on chronic congestive heart failure, unspecified heart failure type ([facility]) [I50.9]    ID following for   Pt with respiratory failure   Leukopenia   Right pleural effusion h/o + gram  stain from pleural fluid  -1000 cc of clear yellow  color pleural fluid drained from patient  -resp cx E cloacae/C albicans   -oral candidiasis  - cefepime til   -S/p linezolid (ZYVOX)  stop due to thrombocytopenia  -Fluconazole day 4    Kidney failure recently started on hemo urine cx E cloacae   LEFT LE HEALED   MRSA neg            BP (!) 147/88   Pulse 80   Temp 97.5 °F (36.4 °C) (Axillary)   Resp 18   Ht 5' (1.524 m)   Wt 214 lb 9.6 oz (97.3 kg)   SpO2 95%   BMI 41.91 kg/m²   Temp  Av.9 °F (36.6 °C)  Min: 97.5 °F (36.4 °C)  Max: 98.4 °F (36.9 °C)  CONSTITUTIONAL:  no apparent distress,  comfortable  ENT:  Normocephalic, atraumatic,     LUNGS:  No increased work of breathing,dec  to auscultation bilaterally, no crackles or wheezing on 1l  CARDIOVASCULAR:    regular rate and rhythm, normal S1 and S2,  no murmur noted  ABDOMEN:  normal bowel sounds, soft,  -distended, non-tender pannus   MUSCULOSKELETAL:  swelling  BLE. NEUROLOGIC:  Awake, alert, oriented. Michelle Leaf      SKIN:  normal skin color, texture, turgor and no rashes         Peripheral Intravenous Line:  Peripheral IV 23 Left Antecubital (Active)   Site Assessment Clean, dry & intact 23 1200   Line Status Infusing;Flushed;Sluggish blood return 23 1200   Line Care Connections checked and tightened 23 1200   Phlebitis Assessment No symptoms 23 1200   Infiltration Assessment 0 23 1200   Alcohol Cap Used No 23 1200   Dressing Status Clean, dry & intact 23 1200   Dressing Type Transparent 23 1200         Drain(s):  External Urinary Catheter (Active)   Output (mL) 50 mL 23 0600        Microbiology:        Lab Results   Component Value Date/Time    BC 24 Hours no growth 2023 09:00 AM    BC 24 Hours no growth 2023 08:30 AM    BC 24 Hours no growth 03/10/2023 12:01 AM    BLOODCULT2 24 Hours no growth 03/10/2023 12:01 AM    BLOODCULT2 5 Days no growth 2023 05:23 PM BLOODCULT2 5 Days no growth 10/12/2022 04:59 PM    ORG Enterobacter cloacae complex 03/10/2023 02:43 PM    ORG Candida albicans 03/10/2023 02:43 PM    ORG Enterobacter cloacae complex 03/10/2023 03:25 AM     CULTURE, RESPIRATORY   Date Value Ref Range Status   03/10/2023 Oral Pharyngeal Shavon reduced (A)  Final   03/10/2023 One colony  Final   03/10/2023 Moderate growth  Final        WOUND/ABSCESS   Date Value Ref Range Status   01/17/2023 Light growth  Final   07/05/2022 Heavy growth  Final   07/05/2022 Heavy growth  Final     Smear, Respiratory   Date Value Ref Range Status   03/10/2023   Final    Moderate Polymorphonuclear leukocytes  Few Epithelial cells  Rare yeast  Rare Gram positive cocci           Component Value Date/Time    AFBCX No growth after 6 weeks of incubation. 10/18/2022 1106    AFBCX No growth after 6 weeks of incubation. 07/08/2022 1546    FUNGSM No Fungal elements seen 07/08/2022 1546    LABFUNG No growth after 4 weeks of incubation.  07/08/2022 1546       MRSA Culture Only   Date Value Ref Range Status   03/10/2023 Methicillin resistant Staph aureus not isolated  Final       LABS:  Recent Labs     03/12/23  0434 03/12/23  1149 03/13/23  0408 03/14/23  0538   WBC 4.0*  --  5.0 5.1   RBC 2.87*  --  3.30* 3.05*   HGB 7.2* 9.2* 8.1* 7.8*   HCT 25.7* 32.3* 31.6* 28.9*   MCV 89.5  --  95.8 94.8   MCH 25.1*  --  24.5* 25.6*   MCHC 28.0*  --  25.6* 27.0*   RDW 19.6*  --  19.2* 18.9*   *  --  87* 82*   MPV 10.7  --  11.7 11.8       Recent Labs     03/12/23  0434 03/13/23  0408 03/14/23  0538    139 135   K 3.0* 4.2 3.9   CL 97* 99 97*   CO2 29 27 28   BUN 33* 46* 29*   CREATININE 2.5* 2.7* 1.7*   GLUCOSE 129* 160* 118*   CALCIUM 8.4* 8.4* 8.2*       Lab Results   Component Value Date    SEDRATE 6 07/06/2022    SEDRATE 15 10/24/2021    SEDRATE 115 (H) 02/03/2021     Lab Results   Component Value Date    CRP 5.0 (H) 07/06/2022    CRP 0.7 (H) 10/25/2021    CRP 13.5 (H) 02/03/2021     Lab Results   Component Value Date    PROCAL 0.40 (H) 03/10/2023    PROCAL 0.89 (H) 02/16/2023    PROCAL 0.33 (H) 01/18/2023     Recent Labs     03/13/23  0408   FERRITIN 230         REVIEW OF SYSTEMS     As stated above in the chief complaint, otherwise negative.   CURRENT MEDICATIONS     Current Facility-Administered Medications:     pantoprazole (PROTONIX) 40 mg in sodium chloride (PF) 0.9 % 10 mL injection, 40 mg, IntraVENous, Q12H, RASHARD Ann CNP, 40 mg at 03/14/23 0545    levothyroxine (SYNTHROID) tablet 175 mcg, 175 mcg, Oral, Daily, Brandon Kemp MD, 175 mcg at 03/14/23 0543    predniSONE (DELTASONE) tablet 5 mg, 5 mg, Oral, Daily, Brandon Kemp MD, 5 mg at 03/14/23 0909    0.9 % sodium chloride infusion, , IntraVENous, PRN, RASHARD Ann CNP    phenylephrine (JIMENA-SYNEPHRINE) 50 mg in sodium chloride 0.9 % 250 mL infusion,  mcg/min, IntraVENous, Continuous, RASHARD Ann CNP, Last Rate: 3 mL/hr at 03/12/23 1508, 10 mcg/min at 03/12/23 1508    epoetin josé luis-epbx (RETACRIT) injection 3,000 Units, 3,000 Units, SubCUTAneous, Once per day on Mon Wed Fri, Davon Matos, RASHARD - CNP, 3,000 Units at 03/13/23 7282    sertraline (ZOLOFT) tablet 50 mg, 50 mg, Oral, Nightly, Brandon Kemp MD, 50 mg at 03/13/23 2208    rOPINIRole (REQUIP) tablet 1 mg, 1 mg, Oral, TID, Brandon Kemp MD, 1 mg at 03/14/23 0910    carbidopa-levodopa (SINEMET CR)  MG per extended release tablet 2 tablet, 2 tablet, Oral, TID, Brandon Kemp MD, 2 tablet at 03/14/23 0908    metoprolol tartrate (LOPRESSOR) tablet 25 mg, 25 mg, Oral, BID, Brandon Kemp MD, 25 mg at 03/14/23 0910    fluconazole (DIFLUCAN) in 0.9 % sodium chloride IVPB 200 mg, 200 mg, IntraVENous, Q24H, Judie Soriano MD, Last Rate: 100 mL/hr at 03/14/23 0919, 200 mg at 03/14/23 0919    glucose chewable tablet 16 g, 4 tablet, Oral, PRN, Adelene Buffy Barone MD    dextrose bolus 10% 125 mL, 125 mL, IntraVENous, PRN **OR** dextrose bolus 10% 250 mL, 250 mL, IntraVENous, PRN, Dennis Murphy MD    glucagon (rDNA) injection 1 mg, 1 mg, SubCUTAneous, PRN, Dennis Murhpy MD    dextrose 10 % infusion, , IntraVENous, Continuous PRN, Dennis Murphy MD    [facility] [place] by provider] apixaban (ELIQUIS) tablet 5 mg, 5 mg, Oral, BID, Marcus Santos MD, 5 mg at 03/11/23 2046    insulin lispro (HUMALOG) injection vial 0-4 Units, 0-4 Units, SubCUTAneous, Nightly, Dennis Murphy MD, 4 Units at 03/11/23 2051    insulin glargine (LANTUS) injection vial 8 Units, 8 Units, SubCUTAneous, BID, Dennis Murphy MD, 8 Units at 03/13/23 2159    insulin lispro (HUMALOG) injection vial 0-8 Units, 0-8 Units, SubCUTAneous, TID WC, Dennis Murphy MD, 4 Units at 03/13/23 9014    QUEtiapine (SEROQUEL) tablet 25 mg, 25 mg, Oral, Nightly, RASHARD Newby - CNP, 25 mg at 03/13/23 2208    sodium chloride flush 0.9 % injection 10 mL, 10 mL, IntraVENous, 2 times per day, Sadi Joe MD, 10 mL at 03/14/23 0910    sodium chloride flush 0.9 % injection 10 mL, 10 mL, IntraVENous, PRN, Sadi Joe MD, 10 mL at 03/11/23 1956    0.9 % sodium chloride infusion, , IntraVENous, PRN, Sadi Joe MD, Stopped at 03/11/23 0921    promethazine (PHENERGAN) tablet 12.5 mg, 12.5 mg, Oral, Q6H PRN, 12.5 mg at 03/14/23 1010 **OR** ondansetron (ZOFRAN) injection 4 mg, 4 mg, IntraVENous, Q6H PRN, Sadi Joe MD, 4 mg at 03/12/23 1102    polyethylene glycol (GLYCOLAX) packet 17 g, 17 g, Oral, Daily PRN, Sadi Joe MD    acetaminophen (TYLENOL) tablet 650 mg, 650 mg, Oral, Q6H PRN, 650 mg at 03/13/23 1027 **OR** acetaminophen (TYLENOL) suppository 650 mg, 650 mg, Rectal, Q6H PRN, Sadi Joe MD    DOPamine (INTROPIN) 800 mg in dextrose 5 % 250 mL infusion, 1-20 mcg/kg/min, IntraVENous, Continuous, RASHARD Newby - CNP, Stopped at 03/11/23 5887    budesonide (PULMICORT) nebulizer suspension 500 mcg, 0.5 mg, Nebulization, BID, RASHARD Webb CNP, 500 mcg at 03/14/23 0445    albuterol (PROVENTIL) nebulizer solution 2.5 mg, 2.5 mg, Nebulization, Q4H WA, 2.5 mg at 03/14/23 0913 **AND** ipratropium (ATROVENT) 0.02 % nebulizer solution 0.5 mg, 0.5 mg, Nebulization, Q4H WA, RASHARD Webb CNP, 0.5 mg at 03/14/23 0913    cefepime (MAXIPIME) 1,000 mg in sodium chloride 0.9 % 50 mL IVPB ([id number]), 1,000 mg, IntraVENous, Q12H, Aranza Henriquez MD, Stopped at 03/14/23 0848  DIAGNOSTIC RESULTS   Radiology:  CT CHEST WO CONTRAST    Result Date: 3/11/2023  EXAMINATION: CT OF THE CHEST WITHOUT CONTRAST 3/10/2023 4:39 pm TECHNIQUE: CT of the chest was performed without the administration of intravenous contrast. Multiplanar reformatted images are provided for review. Automated exposure control, iterative reconstruction, and/or weight based adjustment of the mA/kV was utilized to reduce the radiation dose to as low as reasonably achievable. COMPARISON: Radiographs of the same day. HISTORY: ORDERING SYSTEM PROVIDED HISTORY: Pleural effusion TECHNOLOGIST PROVIDED HISTORY: Reason for exam:->Pleural effusion FINDINGS: Mediastinum: Tunnel right jugular hemodialysis catheter tip well positioned proximal right atrium. Endotracheal tube tip with good position 2.6 cm above the juan francisco. Gastric tube with good position proximal side hole beneath the hemidiaphragms. Normal caliber and contour of the thoracic aorta. No mediastinal adenopathy detected. Moderate cardiomegaly. Small pericardial effusion. Lungs/pleura: Moderate right and small left pleural effusions with associated compressive atelectasis. Mild re-expansion edema through the right lung, status post thoracentesis noted. Elevated right hemidiaphragm with moderate perihepatic ascites observed in the upper abdomen. Upper Abdomen: Moderate mainly perihepatic ascites. Gastric tube with good positioning. Soft Tissues/Bones: No acute osseous or body wall soft tissue abnormalities.      1. Moderate right and small left pleural effusions with associated compressive atelectasis. 2. Elevation of the right hemidiaphragm with underlying moderate perihepatic ascites. 3. Moderate cardiomegaly and small pericardial effusion. 4. Endotracheal tube, gastric tube, and right jugular tunneled implanted hemodialysis catheter with good positioning. RECOMMENDATIONS: Careful clinical correlation and follow up recommended. IR FLUORO GUIDED CVA DEVICE PLMT/REPLACE/REMOVAL    Result Date: 2/9/2023  PROCEDURE: IR FLUOROSCOPY GUIDED CENTRAL VENOUS ACCESS DEVICE PLACEMENT; US GUIDED VASCULAR ACCESS MODERATE CONSCIOUS SEDATION 2/9/2023 HISTORY: ORDERING SYSTEM PROVIDED HISTORY: Tunneled HD line along with separate iv access TECHNOLOGIST PROVIDED HISTORY: Reason for exam:->Tunneled HD line along with separate iv access TECHNIQUE: Ultrasound and fluoroscopic guided CONTRAST: None SEDATION: Moderate sedation was ordered and supervised by the attending with physician face-to-face monitoring. Medications were provided and recorded by Radiology nurses. The administration, documentation and monitoring of IV conscious sedation under my direct supervision for time frame of 20 minutes. 50 mcg of IV fentanyl was administered. Interventional radiology nursing staff monitored the patient the throughout the exam. FLUOROSCOPY DOSE AND TYPE: Radiation Exposure Indices: Fluoroscopy time equals 0.3 minutes. Total dose equals 3.29 mGy DESCRIPTION OF PROCEDURE: Informed consent was obtained after a detailed explanation of the procedure including risks, benefits, and alternatives. Universal protocol was observed. Sterile gowns, masks, hats and gloves utilized for maximal sterile barrier. FINDINGS: The patient's neck was prepped and draped in a sterile fashion using maximum sterile barrier technique. Ultrasound images demonstrate a patent right internal jugular vein.  Following the uneventful administration of lidocaine using ultrasound guidance I introduced a micro puncture needle into the right internal jugular vein. Through the micro needle a microwire was advanced. Over the microwire a micro sheath was placed. Through the micro sheath an Amplatz wire was advanced into the superior vena cava. 2 dilators were used to dilate a tract into the right internal jugular vein. I then anesthetized a tract along the chest wall measuring 10 cm. Using a blunt tunneling device I tunneled the catheter to the dilator within the right internal jugular vein. Over the Amplatz wire peel-away sheath was placed. Through the peel-away sheath the dual lumen 15 [place] 28 cm dialysis catheter was placed. The catheter tip is positioned at the SVC right atrial junction. The patient tolerated the procedure well and there were no complications. The catheter was then secured to the skin with 2-0 silk suture. The incision overlying the right internal jugular vein was also sutured with 2-0 silk suture. Successful placement of a tunneled dialysis catheter via the right internal jugular vein. Administration of conscious sedation. XR CHEST PORTABLE    Result Date: 3/10/2023  EXAMINATION: ONE XRAY VIEW OF THE CHEST; ONE SUPINE XRAY VIEW(S) OF THE ABDOMEN 3/10/2023 5:49 am COMPARISON: 4 hours prior. HISTORY: ORDERING SYSTEM PROVIDED HISTORY: ett placement TECHNOLOGIST PROVIDED HISTORY: Reason for exam:->ett placement; ORDERING SYSTEM PROVIDED HISTORY: Confirmation of course of NG/OG/NE tube and location of tip of tube TECHNOLOGIST PROVIDED HISTORY: Reason for exam:->Confirmation of course of NG/OG/NE tube and location of tip of tube Portable? ->Yes FINDINGS: Endotracheal tube tip with good positioning 3.7 cm above the juan francisco. Gastric tube with good position, proximal side hole beneath the hemidiaphragms. Right jugular hemodialysis catheter remains with good positioning. Obscuration of the right cardiac base and right hemidiaphragm. Improving aeration right base.   Small right pleural effusion noted. No pneumothorax. Body wall soft tissues unremarkable. Osseous thorax intact. 1. Endotracheal tube and gastric tube with good positioning. 2. Right jugular hemodialysis catheter remains with good positioning. 3. Improved aeration of the right base with small right pleural effusion now evident. RECOMMENDATION: Careful clinical correlation and follow up recommended. XR CHEST PORTABLE    Result Date: 3/10/2023  EXAMINATION: ONE XRAY VIEW OF THE CHEST 3/9/2023 11:39 pm COMPARISON: Chest one view, 02/23/2023 HISTORY: ORDERING SYSTEM PROVIDED HISTORY: hypotension TECHNOLOGIST PROVIDED HISTORY: Reason for exam:->hypotension FINDINGS: Lines, tubes, and devices: There is stable position of right internal jugular tip localized at the atriocaval junction. Lungs and pleura: There is mild pulmonary vascular congestion and persistent small right pleural effusion. There is interval worsening atelectasis in the right middle lobe and right upper lobe. Right lower lobe atelectasis and/or pneumonitis persists. No pneumothorax. Cardiomediastinal silhouette: The mediastinum is stable. The heart size is normal. Bones and soft tissues: There are surgical clips in right axilla. Surgical clips are superimposed over right upper quadrant. Mild pulmonary vascular congestion. Interval worsening of right upper lobe and right middle lobe atelectasis. Persistent right lower lobe atelectasis and/or pneumonitis. Small right pleural effusion. XR CHEST PORTABLE    Result Date: 2/23/2023  EXAMINATION: ONE XRAY VIEW OF THE CHEST 2/23/2023 3:58 pm COMPARISON: February 23, 2023 HISTORY: ORDERING SYSTEM PROVIDED HISTORY: post procedure TECHNOLOGIST PROVIDED HISTORY: Reason for exam:->post procedure FINDINGS: Cardiomegaly. Lungs clear with mild right basilar atelectasis. No pneumothorax or effusion. Osseous thorax intact.   Right jugular hemodialysis catheter tip remains well positioned at the cavoatrial junction. No acute disease. RECOMMENDATION: Careful clinical correlation and follow up recommended. XR CHEST PORTABLE    Result Date: 2/22/2023  EXAMINATION: ONE XRAY VIEW OF THE CHEST 2/22/2023 2:36 pm COMPARISON: 10/16/2023. HISTORY: ORDERING SYSTEM PROVIDED HISTORY: pleural effusion evaluation TECHNOLOGIST PROVIDED HISTORY: Reason for exam:->pleural effusion evaluation FINDINGS: The lungs are without acute focal process. There is no effusion or pneumothorax. Cardiomegaly. The osseous structures are without acute process. Right-sided central line catheter at the caval atrial junction. No sizable pleural effusion. XR CHEST PORTABLE    Result Date: 2/16/2023  EXAMINATION: ONE XRAY VIEW OF THE CHEST 2/16/2023 6:33 am COMPARISON: 02/15/2023 HISTORY: ORDERING SYSTEM PROVIDED HISTORY: F/U pleural effusion TECHNOLOGIST PROVIDED HISTORY: Reason for exam:->F/U pleural effusion FINDINGS: Moderate cardiomegaly is unchanged. There is mild pulmonary venous congestion. Right IJ dialysis catheter is stable. Prior noted infiltrate versus atelectasis at the left lung base has resolved. There is improved aeration the right upper lobe with minimal residual apical density. No pneumothorax is identified. Bony structures are unremarkable. Improvement in bilateral lung infiltrates as described. XR CHEST PORTABLE    Result Date: 2/15/2023  EXAMINATION: ONE XRAY VIEW OF THE CHEST 2/15/2023 5:28 am COMPARISON: 14 February 2023 HISTORY: ORDERING SYSTEM PROVIDED HISTORY: SOB TECHNOLOGIST PROVIDED HISTORY: Reason for exam:->SOB FINDINGS: Unchanged right-sided central venous catheter. A right pleural effusion appears diminished. There is now new opacity at the right upper lobe which could be infiltrate and or atelectasis. There is unchanged infiltrate and or atelectasis at the left lower lobe is well. Diminished right pleural effusion. New infiltrate and or atelectasis at the right upper lobe.   Stable infiltrate and or atelectasis at the left lower lobe. XR CHEST 1 VIEW    Result Date: 2/14/2023  EXAMINATION: ONE XRAY VIEW OF THE CHEST 2/14/2023 3:28 pm COMPARISON: 02/09/2023 HISTORY: ORDERING SYSTEM PROVIDED HISTORY: Post Chest Tube Placement TECHNOLOGIST PROVIDED HISTORY: Reason for exam:->Post Chest Tube Placement FINDINGS: There is cardiomegaly. Large pleural effusion with infiltrates and atelectasis in the right lung is identified with small amount of ventilated right upper lobe. There is infiltrates and effusions left base as well. A right chest tube is noted in the right lung base which may be partially kinked. There is a right IJ dialysis catheter. Stable abnormal chest with slightly improved ventilation of the right lung with persistent large right pleural effusion with infiltrates and atelectasis in the right lung base and to a lesser extent in the left base. Right chest tube which may be partially kinked. There is no pneumothorax. XR ABDOMEN FOR NG/OG/NE TUBE PLACEMENT    Result Date: 3/10/2023  EXAMINATION: ONE XRAY VIEW OF THE CHEST; ONE SUPINE XRAY VIEW(S) OF THE ABDOMEN 3/10/2023 5:49 am COMPARISON: 4 hours prior. HISTORY: ORDERING SYSTEM PROVIDED HISTORY: ett placement TECHNOLOGIST PROVIDED HISTORY: Reason for exam:->ett placement; ORDERING SYSTEM PROVIDED HISTORY: Confirmation of course of NG/OG/NE tube and location of tip of tube TECHNOLOGIST PROVIDED HISTORY: Reason for exam:->Confirmation of course of NG/OG/NE tube and location of tip of tube Portable? ->Yes FINDINGS: Endotracheal tube tip with good positioning 3.7 cm above the juan francisco. Gastric tube with good position, proximal side hole beneath the hemidiaphragms. Right jugular hemodialysis catheter remains with good positioning. Obscuration of the right cardiac base and right hemidiaphragm. Improving aeration right base. Small right pleural effusion noted. No pneumothorax. Body wall soft tissues unremarkable.  Osseous thorax intact. 1. Endotracheal tube and gastric tube with good positioning. 2. Right jugular hemodialysis catheter remains with good positioning. 3. Improved aeration of the right base with small right pleural effusion now evident. RECOMMENDATION: Careful clinical correlation and follow up recommended. IR ULTRASOUND GUIDANCE VASCULAR ACCESS    Result Date: 2/9/2023  PROCEDURE: IR FLUOROSCOPY GUIDED CENTRAL VENOUS ACCESS DEVICE PLACEMENT; US GUIDED VASCULAR ACCESS MODERATE CONSCIOUS SEDATION 2/9/2023 HISTORY: ORDERING SYSTEM PROVIDED HISTORY: Tunneled HD line along with separate iv access TECHNOLOGIST PROVIDED HISTORY: Reason for exam:->Tunneled HD line along with separate iv access TECHNIQUE: Ultrasound and fluoroscopic guided CONTRAST: None SEDATION: Moderate sedation was ordered and supervised by the attending with physician face-to-face monitoring. Medications were provided and recorded by Radiology nurses. The administration, documentation and monitoring of IV conscious sedation under my direct supervision for time frame of 20 minutes. 50 mcg of IV fentanyl was administered. Interventional radiology nursing staff monitored the patient the throughout the exam. FLUOROSCOPY DOSE AND TYPE: Radiation Exposure Indices: Fluoroscopy time equals 0.3 minutes. Total dose equals 3.29 mGy DESCRIPTION OF PROCEDURE: Informed consent was obtained after a detailed explanation of the procedure including risks, benefits, and alternatives. Universal protocol was observed. Sterile gowns, masks, hats and gloves utilized for maximal sterile barrier. FINDINGS: The patient's neck was prepped and draped in a sterile fashion using maximum sterile barrier technique. Ultrasound images demonstrate a patent right internal jugular vein. Following the uneventful administration of lidocaine using ultrasound guidance I introduced a micro puncture needle into the right internal jugular vein.   Through the micro needle a microwire was advanced. Over the microwire a micro sheath was placed. Through the micro sheath an Amplatz wire was advanced into the superior vena cava. 2 dilators were used to dilate a tract into the right internal jugular vein. I then anesthetized a tract along the chest wall measuring 10 cm. Using a blunt tunneling device I tunneled the catheter to the dilator within the right internal jugular vein. Over the Amplatz wire peel-away sheath was placed. Through the peel-away sheath the dual lumen 15 Citizen of [place] 28 cm dialysis catheter was placed. The catheter tip is positioned at the SVC right atrial junction. The patient tolerated the procedure well and there were no complications. The catheter was then secured to the skin with 2-0 silk suture. The incision overlying the right internal jugular vein was also sutured with 2-0 silk suture. Successful placement of a tunneled dialysis catheter via the right internal jugular vein. Administration of conscious sedation. IR GUIDED THORACENTESIS PLEURAL    Result Date: 3/10/2023  PROCEDURE: ULTRASOUNDGUIDED RIGHT THORACENTESIS 3/10/2023 HISTORY: ORDERING SYSTEM PROVIDED HISTORY: right pleural effusion/right thoracentesis, send labs please TECHNOLOGIST PROVIDED HISTORY: right pleural effusion/right thoracentesis, send labs please Reason for exam:->right pleural effusion/right thoracentesis, send labs please Which side should the procedure be performed?->Right TECHNIQUE: Informed consent was obtained after a detailed explanation of the procedure including risks, benefits, and alternatives. Universal protocol was performed. The right chest was prepped and draped in sterile fashion and local anesthesia was achieved with lidocaine. An 8 Citizen of [place] needle sheath was advanced under ultrasound guidance into pleural effusion and thoracentesis was performed. The patient tolerated the procedure well. FINDINGS: A total of 1000 cc was removed. Successful ultrasound guided thoracentesis. IR GUIDED THORACENTESIS PLEURAL    Result Date: 2/14/2023  PROCEDURE: ULTRASOUNDGUIDED RIGHT THORACENTESIS 2/14/2023 HISTORY: ORDERING SYSTEM PROVIDED HISTORY: Pleural Effusion TECHNOLOGIST PROVIDED HISTORY: Reason for exam:->Pleural Effusion Which side should the procedure be performed?->Right TECHNIQUE: Informed consent was obtained after a detailed explanation of the procedure including risks, benefits, and alternatives. Universal protocol was performed. The right chest was prepped and draped in sterile fashion and local anesthesia was achieved with lidocaine. An 8 [place] needle sheath was advanced under ultrasound guidance into pleural effusion and thoracentesis was performed. The patient tolerated the procedure well. FINDINGS: A total of 1000 cc was removed. Successful ultrasound guided thoracentesis. IR GUIDED PERC PLEURAL DRAIN W CATH INSERT    Result Date: 2/14/2023  PROCEDURE: IR PERC CATH PLEURAL DRAIN W/IMAG 2/14/2023 HISTORY: ORDERING SYSTEM PROVIDED HISTORY: Recurrent pleural effusion TECHNOLOGIST PROVIDED HISTORY: Reason for exam:->Recurrent pleural effusion Which side should the procedure be performed?->Right TECHNIQUE: Ultrasound-guided CONTRAST: None SEDATION: None FLUOROSCOPY DOSE AND TYPE: None DESCRIPTION OF PROCEDURE: Informed consent was obtained after a detailed explanation of the procedure including risks, benefits, and alternatives. Universal protocol was observed. Sterile gowns, masks, hats and gloves utilized for maximal sterile barrier. FINDINGS: Ultrasound images of the right pleural space were obtained Note is made of a large right pleural effusion The patient has right back was prepped and draped in a sterile fashion maximum sterile barrier technique. Following the uneventful administration of lidocaine I introduced a 5 [place] catheter into the pleural space. I then anesthetized a 10 cm tract along the posterior chest wall.   Using a blunt tunneling device I tunneled the PleurX catheter to the [facility] catheter. Through the [facility] catheter an Amplatz wire was advanced into the pleural space. Over the Amplatz wire multiple dilators were used to dilate a tract into the right pleural space. The PleurX catheter was then introduced into the peel-away sheath and position within the pleural space. The patient tolerated the procedure well and no complications. A sterile dressing was placed over the entry sites. 1. Successful placement of a PleurX catheter within the right pleural space 2. 1000 cc of fluid was aspirated. IR REMOVAL OF TUNNELED PLEURAL CATH W CUFF    Result Date: 2/23/2023  PROCEDURE: IR REMOVAL TUNNELED PLEURAL CATHETER 2/23/2023 HISTORY: ORDERING SYSTEM PROVIDED HISTORY: PleuRX Catheter removal TECHNOLOGIST PROVIDED HISTORY: Reason for exam:->PleuRX Catheter removal Which side should the procedure be performed?->Radiologist Recommendation TECHNIQUE: Surgical removal of the indwelling PleurX catheter CONTRAST: None SEDATION: None FLUOROSCOPY DOSE AND TYPE: None DESCRIPTION OF PROCEDURE: Informed consent was obtained after a detailed explanation of the procedure including risks, benefits, and alternatives. Universal protocol was observed. Sterile gowns, masks, hats and gloves utilized for maximal sterile barrier. FINDINGS: The patient's right side was prepped and draped in a sterile fashion maximum sterile barrier technique. Following the uneventful administration of lidocaine I manual remove the indwelling PleurX catheter. A sterile dressing was placed over the insertion site. Successful removal of the indwelling right PleurX catheter. Imaging and labs were reviewed per medical records. Thank you for involving me in the care of Ximena Farrar I will continue to follow. Please do not hesitate to call [phone] for any questions or concerns.     Electronically signed by Higinio Paredes MD on 3/14/2023 at 10:37 AM

## 2023-03-15 LAB
ALBUMIN SERPL-MCNC: 3.6 G/DL (ref 3.5–5.2)
ALP SERPL-CCNC: 57 U/L (ref 35–104)
ALT SERPL-CCNC: <5 U/L (ref 0–32)
ANION GAP SERPL CALCULATED.3IONS-SCNC: 11 MMOL/L (ref 7–16)
ANISOCYTOSIS: ABNORMAL
ANISOCYTOSIS: ABNORMAL
AST SERPL-CCNC: 6 U/L (ref 0–31)
BACTERIA BLD CULT ORG #2: NORMAL
BACTERIA BLD CULT: NORMAL
BASOPHILS # BLD: 0 E9/L (ref 0–0.2)
BASOPHILS ABSOLUTE: 0 E9/L (ref 0–0.2)
BASOPHILS NFR BLD: 0 % (ref 0–2)
BASOPHILS RELATIVE PERCENT: 0.1 % (ref 0–2)
BILIRUB DIRECT SERPL-MCNC: 0.4 MG/DL (ref 0–0.3)
BILIRUB INDIRECT SERPL-MCNC: 0.3 MG/DL (ref 0–1)
BILIRUB SERPL-MCNC: 0.7 MG/DL (ref 0–1.2)
BUN SERPL-MCNC: 41 MG/DL (ref 6–23)
CALCIUM SERPL-MCNC: 8.4 MG/DL (ref 8.6–10.2)
CHLORIDE SERPL-SCNC: 99 MMOL/L (ref 98–107)
CO2 SERPL-SCNC: 26 MMOL/L (ref 22–29)
CREAT SERPL-MCNC: 2.3 MG/DL (ref 0.5–1)
EOSINOPHIL # BLD: 0.01 E9/L (ref 0.05–0.5)
EOSINOPHIL NFR BLD: 0.2 % (ref 0–6)
EOSINOPHILS ABSOLUTE: 0 E9/L (ref 0.05–0.5)
EOSINOPHILS RELATIVE PERCENT: 0.1 % (ref 0–6)
ERYTHROCYTE [DISTWIDTH] IN BLOOD BY AUTOMATED COUNT: 18.1 FL (ref 11.5–15)
GLUCOSE SERPL-MCNC: 72 MG/DL (ref 74–99)
HCT VFR BLD AUTO: 29.6 % (ref 34–48)
HCT VFR BLD CALC: 31.9 % (ref 34–48)
HEMOGLOBIN: 8.4 G/DL (ref 11.5–15.5)
HGB BLD-MCNC: 7.9 G/DL (ref 11.5–15.5)
HYPOCHROMIA: ABNORMAL
HYPOCHROMIA: ABNORMAL
IMM GRANULOCYTES # BLD: 0.05 E9/L
IMM GRANULOCYTES NFR BLD: 0.8 % (ref 0–5)
LACTATE BLDV-SCNC: 1.7 MMOL/L (ref 0.5–2.2)
LYMPHOCYTES # BLD: 1.39 E9/L (ref 1.5–4)
LYMPHOCYTES ABSOLUTE: 1.76 E9/L (ref 1.5–4)
LYMPHOCYTES NFR BLD: 22.4 % (ref 20–42)
LYMPHOCYTES RELATIVE PERCENT: 20.2 % (ref 20–42)
MAGNESIUM SERPL-MCNC: 1.5 MG/DL (ref 1.6–2.6)
MCH RBC QN AUTO: 24.3 PG (ref 26–35)
MCH RBC QN AUTO: 24.5 PG (ref 26–35)
MCHC RBC AUTO-ENTMCNC: 26.3 % (ref 32–34.5)
MCHC RBC AUTO-ENTMCNC: 26.7 % (ref 32–34.5)
MCV RBC AUTO: 91.9 FL (ref 80–99.9)
MCV RBC AUTO: 92.5 FL (ref 80–99.9)
METER GLUCOSE: 100 MG/DL (ref 74–99)
METER GLUCOSE: 122 MG/DL (ref 74–99)
METER GLUCOSE: 138 MG/DL (ref 74–99)
METER GLUCOSE: 178 MG/DL (ref 74–99)
MONOCYTES # BLD: 0.37 E9/L (ref 0.1–0.95)
MONOCYTES ABSOLUTE: 0.18 E9/L (ref 0.1–0.95)
MONOCYTES NFR BLD: 6 % (ref 2–12)
MONOCYTES RELATIVE PERCENT: 1.8 % (ref 2–12)
NEUTROPHILS # BLD: 4.38 E9/L (ref 1.8–7.3)
NEUTROPHILS ABSOLUTE: 6.86 E9/L (ref 1.8–7.3)
NEUTROPHILS RELATIVE PERCENT: 78.1 % (ref 43–80)
NEUTS SEG NFR BLD: 70.6 % (ref 43–80)
OVALOCYTES: ABNORMAL
OVALOCYTES: ABNORMAL
PDW BLD-RTO: 18.3 FL (ref 11.5–15)
PHOSPHATE SERPL-MCNC: 2.5 MG/DL (ref 2.5–4.5)
PLATELET # BLD AUTO: 93 E9/L (ref 130–450)
PLATELET # BLD: 92 E9/L (ref 130–450)
PLATELET CONFIRMATION: NORMAL
PLATELET CONFIRMATION: NORMAL
PMV BLD AUTO: 12 FL (ref 7–12)
PMV BLD AUTO: 12.6 FL (ref 7–12)
POIKILOCYTES: ABNORMAL
POIKILOCYTES: ABNORMAL
POLYCHROMASIA: ABNORMAL
POLYCHROMASIA: ABNORMAL
POTASSIUM SERPL-SCNC: 3.9 MMOL/L (ref 3.5–5)
PROT SERPL-MCNC: 5.4 G/DL (ref 6.4–8.3)
RBC # BLD AUTO: 3.22 E12/L (ref 3.5–5.5)
RBC # BLD: 3.45 E12/L (ref 3.5–5.5)
SCHISTOCYTES: ABNORMAL
SODIUM SERPL-SCNC: 136 MMOL/L (ref 132–146)
TEAR DROP CELLS: ABNORMAL
TEAR DROP CELLS: ABNORMAL
WBC # BLD: 6.2 E9/L (ref 4.5–11.5)
WBC # BLD: 8.8 E9/L (ref 4.5–11.5)

## 2023-03-15 PROCEDURE — 6360000002 HC RX W HCPCS

## 2023-03-15 PROCEDURE — 2060000000 HC ICU INTERMEDIATE R&B

## 2023-03-15 PROCEDURE — 99232 SBSQ HOSP IP/OBS MODERATE 35: CPT | Performed by: INTERNAL MEDICINE

## 2023-03-15 PROCEDURE — 6370000000 HC RX 637 (ALT 250 FOR IP): Performed by: CLINICAL NURSE SPECIALIST

## 2023-03-15 PROCEDURE — 97110 THERAPEUTIC EXERCISES: CPT | Performed by: PHYSICAL THERAPIST

## 2023-03-15 PROCEDURE — 36415 COLL VENOUS BLD VENIPUNCTURE: CPT

## 2023-03-15 PROCEDURE — 97530 THERAPEUTIC ACTIVITIES: CPT | Performed by: PHYSICAL THERAPIST

## 2023-03-15 PROCEDURE — 2580000003 HC RX 258: Performed by: INTERNAL MEDICINE

## 2023-03-15 PROCEDURE — 6360000002 HC RX W HCPCS: Performed by: INTERNAL MEDICINE

## 2023-03-15 PROCEDURE — 2580000003 HC RX 258

## 2023-03-15 PROCEDURE — 84100 ASSAY OF PHOSPHORUS: CPT

## 2023-03-15 PROCEDURE — 6370000000 HC RX 637 (ALT 250 FOR IP): Performed by: INTERNAL MEDICINE

## 2023-03-15 PROCEDURE — 82962 GLUCOSE BLOOD TEST: CPT

## 2023-03-15 PROCEDURE — A4216 STERILE WATER/SALINE, 10 ML: HCPCS

## 2023-03-15 PROCEDURE — 80076 HEPATIC FUNCTION PANEL: CPT

## 2023-03-15 PROCEDURE — 94640 AIRWAY INHALATION TREATMENT: CPT

## 2023-03-15 PROCEDURE — 6370000000 HC RX 637 (ALT 250 FOR IP)

## 2023-03-15 PROCEDURE — 90935 HEMODIALYSIS ONE EVALUATION: CPT

## 2023-03-15 PROCEDURE — 85025 COMPLETE CBC W/AUTO DIFF WBC: CPT

## 2023-03-15 PROCEDURE — 83605 ASSAY OF LACTIC ACID: CPT

## 2023-03-15 PROCEDURE — 94660 CPAP INITIATION&MGMT: CPT

## 2023-03-15 PROCEDURE — 83735 ASSAY OF MAGNESIUM: CPT

## 2023-03-15 PROCEDURE — 2700000000 HC OXYGEN THERAPY PER DAY

## 2023-03-15 PROCEDURE — C9113 INJ PANTOPRAZOLE SODIUM, VIA: HCPCS

## 2023-03-15 PROCEDURE — 6360000002 HC RX W HCPCS: Performed by: SPECIALIST

## 2023-03-15 PROCEDURE — 80048 BASIC METABOLIC PNL TOTAL CA: CPT

## 2023-03-15 RX ORDER — FLUCONAZOLE 100 MG/1
100 TABLET ORAL NIGHTLY
Status: DISCONTINUED | OUTPATIENT
Start: 2023-03-15 | End: 2023-03-16 | Stop reason: HOSPADM

## 2023-03-15 RX ORDER — FLUCONAZOLE 100 MG/1
100 TABLET ORAL DAILY
Qty: 7 TABLET | Refills: 0 | DISCHARGE
Start: 2023-03-15 | End: 2023-03-22

## 2023-03-15 RX ORDER — MAGNESIUM SULFATE IN WATER 40 MG/ML
2000 INJECTION, SOLUTION INTRAVENOUS ONCE
Status: COMPLETED | OUTPATIENT
Start: 2023-03-15 | End: 2023-03-16

## 2023-03-15 RX ADMIN — FLUCONAZOLE IN SODIUM CHLORIDE 200 MG: 2 INJECTION, SOLUTION INTRAVENOUS at 09:37

## 2023-03-15 RX ADMIN — ACETAMINOPHEN 650 MG: 325 TABLET ORAL at 03:08

## 2023-03-15 RX ADMIN — LEVOTHYROXINE SODIUM 175 MCG: 0.05 TABLET ORAL at 06:03

## 2023-03-15 RX ADMIN — ALBUTEROL SULFATE 2.5 MG: 2.5 SOLUTION RESPIRATORY (INHALATION) at 04:48

## 2023-03-15 RX ADMIN — INSULIN GLARGINE 8 UNITS: 100 INJECTION, SOLUTION SUBCUTANEOUS at 09:38

## 2023-03-15 RX ADMIN — Medication 40 MG: at 18:52

## 2023-03-15 RX ADMIN — BUDESONIDE 500 MCG: 0.5 SUSPENSION RESPIRATORY (INHALATION) at 04:48

## 2023-03-15 RX ADMIN — ROPINIROLE HYDROCHLORIDE 1 MG: 1 TABLET, FILM COATED ORAL at 09:31

## 2023-03-15 RX ADMIN — METOPROLOL TARTRATE 25 MG: 25 TABLET, FILM COATED ORAL at 22:19

## 2023-03-15 RX ADMIN — QUETIAPINE FUMARATE 25 MG: 25 TABLET ORAL at 22:19

## 2023-03-15 RX ADMIN — Medication 10 ML: at 09:33

## 2023-03-15 RX ADMIN — EPOETIN ALFA-EPBX 3000 UNITS: 3000 INJECTION, SOLUTION INTRAVENOUS; SUBCUTANEOUS at 18:52

## 2023-03-15 RX ADMIN — CARBIDOPA AND LEVODOPA 2 TABLET: 25; 100 TABLET, EXTENDED RELEASE ORAL at 22:19

## 2023-03-15 RX ADMIN — ALBUTEROL SULFATE 2.5 MG: 2.5 SOLUTION RESPIRATORY (INHALATION) at 09:37

## 2023-03-15 RX ADMIN — ACETAMINOPHEN 650 MG: 325 TABLET ORAL at 22:19

## 2023-03-15 RX ADMIN — IPRATROPIUM BROMIDE 0.5 MG: 0.5 SOLUTION RESPIRATORY (INHALATION) at 04:48

## 2023-03-15 RX ADMIN — SERTRALINE 50 MG: 50 TABLET, FILM COATED ORAL at 22:19

## 2023-03-15 RX ADMIN — CEFEPIME 1000 MG: 1 INJECTION, POWDER, FOR SOLUTION INTRAMUSCULAR; INTRAVENOUS at 03:12

## 2023-03-15 RX ADMIN — INSULIN GLARGINE 8 UNITS: 100 INJECTION, SOLUTION SUBCUTANEOUS at 22:20

## 2023-03-15 RX ADMIN — FLUCONAZOLE 100 MG: 100 TABLET ORAL at 22:19

## 2023-03-15 RX ADMIN — IPRATROPIUM BROMIDE 0.5 MG: 0.5 SOLUTION RESPIRATORY (INHALATION) at 09:37

## 2023-03-15 RX ADMIN — Medication 40 MG: at 06:03

## 2023-03-15 RX ADMIN — METOPROLOL TARTRATE 25 MG: 25 TABLET, FILM COATED ORAL at 09:31

## 2023-03-15 RX ADMIN — CARBIDOPA AND LEVODOPA 2 TABLET: 25; 100 TABLET, EXTENDED RELEASE ORAL at 09:32

## 2023-03-15 RX ADMIN — SODIUM CHLORIDE: 9 INJECTION, SOLUTION INTRAVENOUS at 03:07

## 2023-03-15 RX ADMIN — PREDNISONE 5 MG: 5 TABLET ORAL at 09:32

## 2023-03-15 RX ADMIN — MAGNESIUM SULFATE HEPTAHYDRATE 2000 MG: 40 INJECTION, SOLUTION INTRAVENOUS at 22:16

## 2023-03-15 RX ADMIN — ROPINIROLE HYDROCHLORIDE 1 MG: 1 TABLET, FILM COATED ORAL at 22:19

## 2023-03-15 ASSESSMENT — PAIN SCALES - GENERAL: PAINLEVEL_OUTOF10: 0

## 2023-03-15 NOTE — PROGRESS NOTES
Pulmonary/Critical Care Progress Note    We are following patient for hypothyroidism (corrected), myxedema coma, recurrent bilateral pleural effusions which are stable, diastolic dysfunction, CKD on dialysis, morbid obesity (improved), alveolar hypoventilation, obstructive sleep apnea, atrial fibrillation, Parkinson's disease    SUBJECTIVE:  Patient is awake and alert and wonders when she can go home. She did not eat lunch because of dialysis but feels like eating supper. The patient continues treatment with cefepime supervised by the infectious disease service. She did not have a chest x-ray today. She is not having any symptoms from her Enterobacter cloacae complex pneumonia which is sensitive to the cefepime.     MEDICATIONS:   magnesium sulfate  2,000 mg IntraVENous Once    fluconazole  100 mg Oral Nightly    cefepime  2,000 mg IntraVENous Once per day on Mon Wed Fri    pantoprazole (PROTONIX) 40 mg injection  40 mg IntraVENous Q12H    levothyroxine  175 mcg Oral Daily    predniSONE  5 mg Oral Daily    epoetin josé luis-epbx  3,000 Units SubCUTAneous Once per day on Mon Wed Fri    sertraline  50 mg Oral Nightly    rOPINIRole  1 mg Oral TID    carbidopa-levodopa  2 tablet Oral TID    metoprolol tartrate  25 mg Oral BID    [Held by provider] apixaban  5 mg Oral BID    insulin lispro  0-4 Units SubCUTAneous Nightly    insulin glargine  8 Units SubCUTAneous BID    insulin lispro  0-8 Units SubCUTAneous TID     QUEtiapine  25 mg Oral Nightly    sodium chloride flush  10 mL IntraVENous 2 times per day    budesonide  0.5 mg Nebulization BID    albuterol  2.5 mg Nebulization Q4H WA    And    ipratropium  0.5 mg Nebulization Q4H WA      sodium chloride      phenylephrine (JIMENA-SYNEPHRINE) 50 mg/250 mL infusion 10 mcg/min (03/12/23 1508)    dextrose      sodium chloride 25 mL/hr at 03/15/23 0307    DOPamine Stopped (03/11/23 1743)     sodium chloride, glucose, dextrose bolus **OR** dextrose bolus, glucagon (rDNA), dextrose, sodium chloride flush, sodium chloride, promethazine **OR** ondansetron, polyethylene glycol, acetaminophen **OR** acetaminophen      REVIEW OF SYSTEMS:  Constitutional: Denies fever, weight loss, night sweats, and fatigue  Skin: Denies pigmentation, dark lesions, and rashes   HEENT: Denies hearing loss, tinnitus, ear drainage, epistaxis, sore throat, and hoarseness. Cardiovascular: Denies palpitations, chest pain, and chest pressure. Respiratory: Denies cough, dyspnea at rest, hemoptysis, apnea, and choking. Gastrointestinal: Denies nausea, vomiting, poor appetite, diarrhea, heartburn or reflux  Genitourinary: Denies dysuria, frequency, urgency or hematuria  Musculoskeletal: Denies myalgias, muscle weakness, and bone pain  Neurological: Denies dizziness, vertigo, headache, and focal weakness  Psychological: Denies anxiety and depression  Endocrine: Denies heat intolerance and cold intolerance  Hematopoietic/Lymphatic: Denies bleeding problems and blood transfusions    OBJECTIVE:  Vitals:    03/15/23 1600   BP: (!) 98/48   Pulse: 86   Resp:    Temp:    SpO2:      FiO2 : 30 %  O2 Flow Rate (L/min): 1 L/min  O2 Device: Nasal cannula    PHYSICAL EXAM:  Constitutional: No fever, chills, diaphoresis  Skin: No skin rash, no skin breakdown  HEENT: Unremarkable. Mucous membranes are moist  Neck: No JVD, lymphadenopathy, thyromegaly  Cardiovascular: S1, S2 regular. No S3 or rubs present  Respiratory: Clear to auscultation bilaterally  Gastrointestinal: Left, obese, nontender  Genitourinary: No CVA tenderness  Extremities: No clubbing, cyanosis, or edema  Neurological: Awake, alert, oriented x3.   No evidence of focal motor or sensory deficits  Psychological: In good spirits    LABS:  WBC   Date Value Ref Range Status   03/15/2023 6.2 4.5 - 11.5 E9/L Final   03/14/2023 8.8 4.5 - 11.5 E9/L Final   03/14/2023 5.1 4.5 - 11.5 E9/L Final     Hemoglobin   Date Value Ref Range Status   03/15/2023 7.9 (L) 11.5 - 15.5 g/dL Final   03/14/2023 8.4 (L) 11.5 - 15.5 g/dL Final   03/14/2023 7.8 (L) 11.5 - 15.5 g/dL Final     Hematocrit   Date Value Ref Range Status   03/15/2023 29.6 (L) 34.0 - 48.0 % Final   03/14/2023 31.9 (L) 34.0 - 48.0 % Final   03/14/2023 28.9 (L) 34.0 - 48.0 % Final     MCV   Date Value Ref Range Status   03/15/2023 91.9 80.0 - 99.9 fL Final   03/14/2023 92.5 80.0 - 99.9 fL Final   03/14/2023 94.8 80.0 - 99.9 fL Final     Platelets   Date Value Ref Range Status   03/15/2023 93 (L) 130 - 450 E9/L Final   03/14/2023 92 (L) 130 - 450 E9/L Final   03/14/2023 82 (L) 130 - 450 E9/L Final     Sodium   Date Value Ref Range Status   03/15/2023 136 132 - 146 mmol/L Final   03/14/2023 135 132 - 146 mmol/L Final   03/13/2023 139 132 - 146 mmol/L Final     Potassium   Date Value Ref Range Status   03/15/2023 3.9 3.5 - 5.0 mmol/L Final   03/14/2023 3.9 3.5 - 5.0 mmol/L Final   03/13/2023 4.2 3.5 - 5.0 mmol/L Final     Potassium reflex Magnesium   Date Value Ref Range Status   02/20/2023 3.6 3.5 - 5.0 mmol/L Final   02/19/2023 4.0 3.5 - 5.0 mmol/L Final   02/18/2023 3.7 3.5 - 5.0 mmol/L Final     Chloride   Date Value Ref Range Status   03/15/2023 99 98 - 107 mmol/L Final   03/14/2023 97 (L) 98 - 107 mmol/L Final   03/13/2023 99 98 - 107 mmol/L Final     CO2   Date Value Ref Range Status   03/15/2023 26 22 - 29 mmol/L Final   03/14/2023 28 22 - 29 mmol/L Final   03/13/2023 27 22 - 29 mmol/L Final     BUN   Date Value Ref Range Status   03/15/2023 41 (H) 6 - 23 mg/dL Final   03/14/2023 29 (H) 6 - 23 mg/dL Final   03/13/2023 46 (H) 6 - 23 mg/dL Final     Creatinine   Date Value Ref Range Status   03/15/2023 2.3 (H) 0.5 - 1.0 mg/dL Final   03/14/2023 1.7 (H) 0.5 - 1.0 mg/dL Final   03/13/2023 2.7 (H) 0.5 - 1.0 mg/dL Final     Glucose   Date Value Ref Range Status   03/15/2023 72 (L) 74 - 99 mg/dL Final   03/14/2023 118 (H) 74 - 99 mg/dL Final   03/13/2023 160 (H) 74 - 99 mg/dL Final     Calcium   Date Value Ref Range Status 03/15/2023 8.4 (L) 8.6 - 10.2 mg/dL Final   03/14/2023 8.2 (L) 8.6 - 10.2 mg/dL Final   03/13/2023 8.4 (L) 8.6 - 10.2 mg/dL Final     Total Protein   Date Value Ref Range Status   03/15/2023 5.4 (L) 6.4 - 8.3 g/dL Final   03/11/2023 6.5 6.4 - 8.3 g/dL Final   03/11/2023 6.4 6.4 - 8.3 g/dL Final     Albumin   Date Value Ref Range Status   03/15/2023 3.6 3.5 - 5.2 g/dL Final   03/11/2023 4.1 3.5 - 5.2 g/dL Final   03/10/2023 3.8 3.5 - 5.2 g/dL Final     Total Bilirubin   Date Value Ref Range Status   03/15/2023 0.7 0.0 - 1.2 mg/dL Final   03/11/2023 1.0 0.0 - 1.2 mg/dL Final   03/10/2023 0.5 0.0 - 1.2 mg/dL Final     Alkaline Phosphatase   Date Value Ref Range Status   03/15/2023 57 35 - 104 U/L Final   03/11/2023 94 35 - 104 U/L Final   03/10/2023 81 35 - 104 U/L Final     AST   Date Value Ref Range Status   03/15/2023 6 0 - 31 U/L Final   03/11/2023 7 0 - 31 U/L Final   03/10/2023 6 0 - 31 U/L Final     ALT   Date Value Ref Range Status   03/15/2023 <5 0 - 32 U/L Final   03/11/2023 <5 0 - 32 U/L Final   03/10/2023 <5 0 - 32 U/L Final     Est, Glom Filt Rate   Date Value Ref Range Status   03/15/2023 22 >=60 mL/min/1.73 Final     Comment:     Pediatric calculator link  [email or web address]_[email or web address]  Effective Oct 3, 2022  These results are not intended for use in patients  <25years of age. eGFR results are calculated without  a race factor using the 2021 CKD-EPI equation. Careful  clinical correlation is recommended, particularly when  comparing to results calculated using previous equations. The CKD-EPI equation is less accurate in patients with  extremes of muscle mass, extra-renal metabolism of  creatinine, excessive creatinine ingestion, or following  therapy that affects renal tubular secretion. 03/14/2023 31 >=60 mL/min/1.73 Final     Comment:     Pediatric calculator link  [email or web address]_[email or web address]  Effective Oct 3, 2022  These results are not intended for use in patients  <25years of age. eGFR results are calculated without  a race factor using the 2021 CKD-EPI equation. Careful  clinical correlation is recommended, particularly when  comparing to results calculated using previous equations. The CKD-EPI equation is less accurate in patients with  extremes of muscle mass, extra-renal metabolism of  creatinine, excessive creatinine ingestion, or following  therapy that affects renal tubular secretion. 03/13/2023 18 >=60 mL/min/1.73 Final     Comment:     Pediatric calculator link  [facility] [email or web address]_[email or web address]  Effective Oct 3, 2022  These results are not intended for use in patients  <25years of age. eGFR results are calculated without  a race factor using the 2021 CKD-EPI equation. Careful  clinical correlation is recommended, particularly when  comparing to results calculated using previous equations. The CKD-EPI equation is less accurate in patients with  extremes of muscle mass, extra-renal metabolism of  creatinine, excessive creatinine ingestion, or following  therapy that affects renal tubular secretion. GFR    Date Value Ref Range Status   10/16/2022 >60  Final   10/14/2022 59  Final   10/13/2022 >60  Final     Magnesium   Date Value Ref Range Status   03/15/2023 1.5 (L) 1.6 - 2.6 mg/dL Final   03/14/2023 1.8 1.6 - 2.6 mg/dL Final   03/13/2023 1.8 1.6 - 2.6 mg/dL Final     Phosphorus   Date Value Ref Range Status   03/15/2023 2.5 2.5 - 4.5 mg/dL Final   03/14/2023 2.5 2.5 - 4.5 mg/dL Final   03/13/2023 3.7 2.5 - 4.5 mg/dL Final     No results for input(s): PH, PO2, PCO2, HCO3, BE, O2SAT in the last 72 hours. RADIOLOGY:  XR CHEST PORTABLE   Final Result   Unchanged right pleural effusion with stable likely adjacent atelectasis.          CT ABDOMEN PELVIS WO CONTRAST Additional Contrast? None   Final Result   Bilateral pleural effusions with small amount of fluid throughout the upper   abdomen. Anasarca seen within the soft tissues with no evidence of acute   intra-abdominopelvic abnormality. No evidence of a retroperitoneal hematoma   or bleed. XR CHEST PORTABLE   Final Result   Slightly worsening aeration on the right with slightly improving aeration on   the left. Suspected underlying pleural effusions, atelectasis and or   infiltrate. Suspected pericardial effusion as before. XR CHEST PORTABLE   Final Result   Trace right pleural effusion or thickening. Mild interstitial prominence, chronic changes versus component of edema or   atypical infection. Bibasilar subsegmental atelectasis. Mild cardiomegaly. CT CHEST WO CONTRAST   Final Result   1. Moderate right and small left pleural effusions with associated   compressive atelectasis. 2. Elevation of the right hemidiaphragm with underlying moderate perihepatic   ascites. 3. Moderate cardiomegaly and small pericardial effusion. 4. Endotracheal tube, gastric tube, and right jugular tunneled implanted   hemodialysis catheter with good positioning. RECOMMENDATIONS:   Careful clinical correlation and follow up recommended. IR GUIDED THORACENTESIS PLEURAL   Final Result   Successful ultrasound guided thoracentesis. XR ABDOMEN FOR NG/OG/NE TUBE PLACEMENT   Final Result   1. Endotracheal tube and gastric tube with good positioning. 2. Right jugular hemodialysis catheter remains with good positioning. 3. Improved aeration of the right base with small right pleural effusion now   evident. RECOMMENDATION:   Careful clinical correlation and follow up recommended. XR CHEST PORTABLE   Final Result   1. Endotracheal tube and gastric tube with good positioning. 2. Right jugular hemodialysis catheter remains with good positioning. 3. Improved aeration of the right base with small right pleural effusion now   evident.       RECOMMENDATION:   Careful clinical correlation and follow up recommended. XR CHEST PORTABLE   Final Result   Mild pulmonary vascular congestion. Interval worsening of right upper lobe and right middle lobe atelectasis. Persistent right lower lobe atelectasis and/or pneumonitis. Small right pleural effusion. PROBLEM LIST:  Principal Problem:    Myxedema coma ([address]  Resolved Problems:    * No resolved hospital problems. *      IMPRESSION:  Myxedema coma, resolved  Bilateral pleural effusions markedly reduced with correction of hypothyroidism  Suspect diastolic dysfunction  Urinary tract infection with Enterobacter cloacae complex  History of atrial fibrillation  CKD on dialysis  Obstructive sleep apnea requiring CPAP or BiPAP at night    PLAN:  CPAP/BiPAP at night  Chest x-ray in a.m. Reduce steroids further if possible  Continue metoprolol which is helping with rate control  May resume apixaban when primary care physician deems it appropriate        Electronically signed by Jennifer Sandoval MD on 3/15/2023 at 4:46 PM        Anh

## 2023-03-15 NOTE — PROGRESS NOTES
Department of Internal Medicine  General Internal Medicine  Attending Progress Note  Chief Complaint   Patient presents with    Hypotension     SUBJECTIVE:    She says she is doing well. Denied fever and chills. No chest pain. No nausea or vomiting. No complaints.   Dtr says intermittent worsening of confusion  OBJECTIVE      Medications    Current Facility-Administered Medications: magnesium sulfate 2000 mg in 50 mL IVPB premix, 2,000 mg, IntraVENous, Once  fluconazole (DIFLUCAN) tablet 100 mg, 100 mg, Oral, Nightly  cefepime (MAXIPIME) 2,000 mg in sodium chloride 0.9 % 50 mL IVPB ([place]ky[place]1Ciu), 2,000 mg, IntraVENous, Once per day on Mon Wed Fri  pantoprazole (PROTONIX) 40 mg in sodium chloride (PF) 0.9 % 10 mL injection, 40 mg, IntraVENous, Q12H  levothyroxine (SYNTHROID) tablet 175 mcg, 175 mcg, Oral, Daily  predniSONE (DELTASONE) tablet 5 mg, 5 mg, Oral, Daily  0.9 % sodium chloride infusion, , IntraVENous, PRN  phenylephrine (JIMENA-SYNEPHRINE) 50 mg in sodium chloride 0.9 % 250 mL infusion,  mcg/min, IntraVENous, Continuous  epoetin josé luis-epbx (RETACRIT) injection 3,000 Units, 3,000 Units, SubCUTAneous, Once per day on Mon Wed Fri  sertraline (ZOLOFT) tablet 50 mg, 50 mg, Oral, Nightly  rOPINIRole (REQUIP) tablet 1 mg, 1 mg, Oral, TID  carbidopa-levodopa (SINEMET CR)  MG per extended release tablet 2 tablet, 2 tablet, Oral, TID  metoprolol tartrate (LOPRESSOR) tablet 25 mg, 25 mg, Oral, BID  glucose chewable tablet 16 g, 4 tablet, Oral, PRN  dextrose bolus 10% 125 mL, 125 mL, IntraVENous, PRN **OR** dextrose bolus 10% 250 mL, 250 mL, IntraVENous, PRN  glucagon (rDNA) injection 1 mg, 1 mg, SubCUTAneous, PRN  dextrose 10 % infusion, , IntraVENous, Continuous PRN  [Held by provider] apixaban (ELIQUIS) tablet 5 mg, 5 mg, Oral, BID  insulin lispro (HUMALOG) injection vial 0-4 Units, 0-4 Units, SubCUTAneous, Nightly  insulin glargine (LANTUS) injection vial 8 Units, 8 Units, SubCUTAneous, BID  insulin lispro (HUMALOG) injection vial 0-8 Units, 0-8 Units, SubCUTAneous, TID WC  QUEtiapine (SEROQUEL) tablet 25 mg, 25 mg, Oral, Nightly  sodium chloride flush 0.9 % injection 10 mL, 10 mL, IntraVENous, 2 times per day  sodium chloride flush 0.9 % injection 10 mL, 10 mL, IntraVENous, PRN  0.9 % sodium chloride infusion, , IntraVENous, PRN  promethazine (PHENERGAN) tablet 12.5 mg, 12.5 mg, Oral, Q6H PRN **OR** ondansetron (ZOFRAN) injection 4 mg, 4 mg, IntraVENous, Q6H PRN  polyethylene glycol (GLYCOLAX) packet 17 g, 17 g, Oral, Daily PRN  acetaminophen (TYLENOL) tablet 650 mg, 650 mg, Oral, Q6H PRN **OR** acetaminophen (TYLENOL) suppository 650 mg, 650 mg, Rectal, Q6H PRN  DOPamine (INTROPIN) 800 mg in dextrose 5 % 250 mL infusion, 1-20 mcg/kg/min, IntraVENous, Continuous  budesonide (PULMICORT) nebulizer suspension 500 mcg, 0.5 mg, Nebulization, BID  albuterol (PROVENTIL) nebulizer solution 2.5 mg, 2.5 mg, Nebulization, Q4H WA **AND** ipratropium (ATROVENT) 0.02 % nebulizer solution 0.5 mg, 0.5 mg, Nebulization, Q4H WA  Physical    VITALS:  /65   Pulse 78   Temp 98.3 °F (36.8 °C) (Oral)   Resp 18   Ht 5' (1.524 m)   Wt 214 lb 9.6 oz (97.3 kg)   SpO2 99%   BMI 41.91 kg/m²   No acute distress moist membranes   Normocephalic, without obvious abnormality, atraumatic, external ears without lesions,   Neck supple no cervical lymphadenopathy  Heart has a regular rate and rhythm no murmur  Lungs are clear to auscultation bilaterally with equal movements. Abdomen is soft nontender nondistended no rebound or guarding. Anasarca  good peripheral perfusion. No significant rashes or new skin lesions. No new focality on neuro exam which is overall grossly intact.   Affect and mood seem to be appropriate     anasarca  Data    CBC:   Lab Results   Component Value Date/Time    WBC 6.2 03/15/2023 06:13 AM    RBC 3.22 03/15/2023 06:13 AM    HGB 7.9 03/15/2023 06:13 AM    HCT 29.6 03/15/2023 06:13 AM    MCV 91.9 03/15/2023 06:13 AM    MCH 24.5 03/15/2023 06:13 AM    MCHC 26.7 03/15/2023 06:13 AM    RDW 18.1 03/15/2023 06:13 AM    PLT 93 03/15/2023 06:13 AM    MPV 12.6 03/15/2023 06:13 AM     BMP:    Lab Results   Component Value Date/Time     03/15/2023 06:13 AM    K 3.9 03/15/2023 06:13 AM    K 3.6 02/20/2023 01:42 PM    CL 99 03/15/2023 06:13 AM    CO2 26 03/15/2023 06:13 AM    BUN 41 03/15/2023 06:13 AM    LABALBU 3.6 03/15/2023 06:13 AM    CREATININE 2.3 03/15/2023 06:13 AM    CALCIUM 8.4 03/15/2023 06:13 AM    GFRAA >60 10/16/2022 09:20 AM    LABGLOM 22 03/15/2023 06:13 AM    GLUCOSE 72 03/15/2023 06:13 AM       ASSESSMENT AND PLAN    Part of History of present illness from History and Physical:  presented with SOB, hypotension  for last few days prior to arrival to the hospital. SOB is associated with some cough, nonproductive but no fever or chills reported. Initially SOB was mild, intermittent but gradually getting more persistent. Started gradually. Exacerbated by general activity or exertion. Relieved only partially by rest. In ED patient was found to be hypotensive with Afib RVR and respiratory failure and was placed on BIPAP    Update: Admitted with altered mentation. 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 She was intubated, started on pressors and Solu-Cortef. She was extubated on 3/11. Gradual clinical improvement has been noted since admission. Patient is anemic and no source of bleeding has been found yet. Myxedema coma with metabolic encephalopathy: improved since IV synthroid was administered. Continue to monitor  Tsh 3/12: 26.9; 3/13 11.6  Acute on chronic Anemia: CKD related. Transfused. stable  ESRD on HD: per Nephro  Atrial Fibrillation: poorly controlled this morning. Resume home medications  Thrombocytopenia: related to possible heparin used.  Added as allergy and started on eliquis on 3/11  DM type 2: insulin as ordered  UTI: positive for Enterococcus Cloacae: abx per ID: Cefepime q  M-W-F through 4/1/2023   diflucan PO  Acute on chronic respiratory failure with hypercapnia: continue supplemental oxygen and bipap per pulmonary  Pleural Effusion: overall stable. Thoracentesis was last admission. Congestive heart failure: stable. Continue current care. DVT prophylaxis: Lovenox  Full code.   Disposition: facility when ready depending on ID and fluid status

## 2023-03-15 NOTE — PROGRESS NOTES
OT SESSION ATTEMPT     Date:3/15/2023  Patient Name: Tamiko Hendrix  MRN: [id number]  : 1950  Room: [address] [place] [address]     OCCUPATIONAL THERAPY TREATMENT NOTE    [address]      Attempted OT session this date:    [] unavailable due to other medical staff currently with pt   [] on hold per nursing staff   [] on hold per nursing staff secondary to lab / radiology results    [] declined treatment  this date due to ____. Benefits of participation in therapy reviewed with pt. [x] off unit at dialysis. [] Other:     Continue with current OT [facility] at a later date/time.       [place] [employer] GELLER/L [id number]

## 2023-03-15 NOTE — PROGRESS NOTES
NAME: Sebas Downey  MR:  [id number]  :   1950  Admit Date:  3/9/2023      This is a face to face encounter with [facility] of this note, including Diagnosis,  Interval History, Past Medical/Surgical/Family/Social Histories, ROS, physical exam, and Assessment and Plan were copied and pasted from Previous. Updates have been made where noted and reflect current exam and medical decision making from the DOS of this encounter. CHIEF COMPLAINT     ID following for   Chief Complaint   Patient presents with    Hypotension     HISTORY OF PRESENT ILLNESS     Sebas Downey is a 67 y.o. 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 Pt seen and examined 03/15/23  Extubated 3/11    Awake- on nasal canula- 1 liters   No fevers. Daughter at bedside. No reported adverse drug reactions.   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]  Elevated troponin [R77.8]  Acute respiratory failure with hypercapnia (HCC) [J96.02]  Chronic renal failure, stage 4 (severe) (HCC) [N18.4]  Hypotension, unspecified hypotension type [I95.9]  Acute on chronic congestive heart failure, unspecified heart failure type ([address] [I50.9]    ID following for   Pt with respiratory failure   Leukopenia   Right pleural effusion h/o + gram stain from pleural fluid  -1000 cc of clear yellow  color pleural fluid drained from patient  -resp cx E cloacae/C albicans   -oral candidiasis  -S/p linezolid (Marlan Councilman)  stop due to thrombocytopenia    Kidney failure recently started on hemo urine cx E cloacae   LEFT LE HEALED   MRSA neg      Plan:   Continue Cefepime 2 grams IV every M-W-F  through 4/1/2023   Continue diflucan day #6- change to PO  Monitor labs   Continue wound care  Daughter at bedside and updated   Precert of [facility] [place] pending     As above  This is a face to face encounter with Ap Lutz on 03/15/23. Patient was evaluated independently of  NURSE PRACTITIONER. I discussed the findings and plans with RASHARD Almendarez and agree as documented in note. See below for additional details and treatment plan. Ap Lutz is a 67 y.o. female who presents with   Chief Complaint   Patient presents with    Hypotension    and has  has a past medical history of A-fib ([address] Acute on chronic congestive heart failure ([address] Anxiety, Asthma, CAD (coronary artery disease), Cancer ([address] Chronic kidney disease, COPD exacerbation ([address] Depression, Diabetes mellitus ([address] H/O mammogram, Hemodialysis patient ([address] Hx MRSA infection, Hyperlipidemia, Hypertension, Lateral epicondylitis, Morbid obesity ([address] Myxedema coma ([address] SERENA on CPAP, Oxygen dependent, Parkinson's disease ([address] Thyroid disease, Tubal ligation status, and VRE (vancomycin-resistant Enterococci) infection.    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]  Elevated troponin [R77.8]  Acute respiratory failure with hypercapnia (HCC) [J96.02]  Chronic renal failure, stage 4 (severe) (HCC) [N18.4]  Hypotension, unspecified hypotension type [I95.9]  Acute on chronic congestive heart failure, unspecified heart failure type ([address] [I50.9]    Id following for for atbx  Pt was seen in HEMO   Has no c/o f/c/nv/d  Rtdc    D/c with cefepime qhemo or 3 weeks at [facility] [place]  Rx parapneumonia effusion/empyema  F/u 2 weeks       fluconazole (DIFLUCAN) tablet 100 mg, Nightly  cefepime (MAXIPIME) 2,000 mg in sodium chloride 0.9 % 50 mL IVPB ([id number]), Once per day on          Imaging and labs were reviewed. Dena Severin was informed of their diagnosis, indications, risks and benefits of treatment. Dena Severin had the opportunity to ask questions. All questions were answered. Thank you for involving me in the care of Dena Severin. Please do not hesitate to call for any questions or concerns. Electronically signed by Elizabeth Rivas MD on 3/15/2023 at 4:09 PM               /76   Pulse 87   Temp 98.3 °F (36.8 °C) (Oral)   Resp 17   Ht 5' (1.524 m)   Wt 214 lb 9.6 oz (97.3 kg)   SpO2 99%   BMI 41.91 kg/m²   Temp  Av.2 °F (36.8 °C)  Min: 97.3 °F (36.3 °C)  Max: 99 °F (37.2 °C)  CONSTITUTIONAL:  no apparent distress,  comfortable- in bed. No distress   ENT:  Normocephalic, atraumatic,     LUNGS:  No increased work of breathing,dec  to auscultation bilaterally, no crackles or wheezing on 1 liter   CARDIOVASCULAR:    regular rate and rhythm, normal S1 and S2,  no murmur noted  ABDOMEN:  normal bowel sounds, soft,  -distended, non-tender pannus   MUSCULOSKELETAL:  swelling  BLE. NEUROLOGIC:  Awake, alert, oriented. Lavkarla Candstephanie      SKIN:  normal skin color, texture, turgor and no rashes   Right chest HD cath      Peripheral Intravenous Line:  Peripheral IV 23 Left Antecubital (Active)   Site Assessment Clean, dry & intact 23 1200   Line Status Infusing;Flushed;Sluggish blood return 23 1200   Line Care Connections checked and tightened 23 1200   Phlebitis Assessment No symptoms 23 1200   Infiltration Assessment 0 23 1200   Alcohol Cap Used No 23 1200   Dressing Status Clean, dry & intact 23 1200   Dressing Type Transparent 23 1200      Drain(s):  External Urinary Catheter (Active)   Output (mL) 50 mL 03/14/23 0600        Microbiology:      Lab Results   Component Value Date/Time    BC 24 Hours no growth 03/11/2023 09:00 AM    BC 24 Hours no growth 03/11/2023 08:30 AM    BC 24 Hours no growth 03/10/2023 12:01 AM    BLOODCULT2 24 Hours no growth 03/10/2023 12:01 AM    BLOODCULT2 5 Days no growth 01/17/2023 05:23 PM    BLOODCULT2 5 Days no growth 10/12/2022 04:59 PM    ORG Enterobacter cloacae complex 03/10/2023 02:43 PM    ORG Candida albicans 03/10/2023 02:43 PM    ORG Enterobacter cloacae complex 03/10/2023 03:25 AM     CULTURE, RESPIRATORY   Date Value Ref Range Status   03/10/2023 Oral Pharyngeal Shavon reduced (A)  Final   03/10/2023 One colony  Final   03/10/2023 Moderate growth  Final        WOUND/ABSCESS   Date Value Ref Range Status   01/17/2023 Light growth  Final   07/05/2022 Heavy growth  Final   07/05/2022 Heavy growth  Final     Smear, Respiratory   Date Value Ref Range Status   03/10/2023   Final    Moderate Polymorphonuclear leukocytes  Few Epithelial cells  Rare yeast  Rare Gram positive cocci           Component Value Date/Time    AFBCX No growth after 6 weeks of incubation. 10/18/2022 1106    AFBCX No growth after 6 weeks of incubation. 07/08/2022 1546    FUNGSM No Fungal elements seen 07/08/2022 1546    LABFUNG No growth after 4 weeks of incubation.  07/08/2022 1546       MRSA Culture Only   Date Value Ref Range Status   03/10/2023 Methicillin resistant Staph aureus not isolated  Final       LABS:  Recent Labs     03/14/23  0538 03/14/23  2308 03/15/23  0613   WBC 5.1 8.8 6.2   RBC 3.05* 3.45* 3.22*   HGB 7.8* 8.4* 7.9*   HCT 28.9* 31.9* 29.6*   MCV 94.8 92.5 91.9   MCH 25.6* 24.3* 24.5*   MCHC 27.0* 26.3* 26.7*   RDW 18.9* 18.3* 18.1*   PLT 82* 92* 93*   MPV 11.8 12.0 12.6*       Recent Labs     03/13/23  0408 03/14/23  0538 03/15/23  0613    135 136   K 4.2 3.9 3.9   CL 99 97* 99   CO2 27 28 26   BUN 46* 29* 41*   CREATININE 2.7* 1.7* 2.3* GLUCOSE 160* 118* 72*   PROT  --   --  5.4*   LABALBU  --   --  3.6   CALCIUM 8.4* 8.2* 8.4*   BILITOT  --   --  0.7   ALKPHOS  --   --  57   AST  --   --  6   ALT  --   --  <5       Lab Results   Component Value Date    SEDRATE 6 07/06/2022    SEDRATE 15 10/24/2021    SEDRATE 115 (H) 02/03/2021     Lab Results   Component Value Date    CRP 5.0 (H) 07/06/2022    CRP 0.7 (H) 10/25/2021    CRP 13.5 (H) 02/03/2021     Lab Results   Component Value Date    PROCAL 0.40 (H) 03/10/2023    PROCAL 0.89 (H) 02/16/2023    PROCAL 0.33 (H) 01/18/2023     Recent Labs     03/13/23  0408 03/15/23  1117   FERRITIN 230  --    AST  --  6   ALT  --  <5         REVIEW OF SYSTEMS     As stated above in the chief complaint, otherwise negative.   CURRENT MEDICATIONS     Current Facility-Administered Medications:     magnesium sulfate 2000 mg in 50 mL IVPB premix, 2,000 mg, IntraVENous, Once, Jorge Us MD    pantoprazole (PROTONIX) 40 mg in sodium chloride (PF) 0.9 % 10 mL injection, 40 mg, IntraVENous, Q12H, RASHARD Luis CNP, 40 mg at 03/15/23 0603    levothyroxine (SYNTHROID) tablet 175 mcg, 175 mcg, Oral, Daily, Katheryn Daniel MD, 175 mcg at 03/15/23 0603    predniSONE (DELTASONE) tablet 5 mg, 5 mg, Oral, Daily, Katheryn Daniel MD, 5 mg at 03/15/23 0932    0.9 % sodium chloride infusion, , IntraVENous, PRN, RASHARD Luis CNP    phenylephrine (JIMENA-SYNEPHRINE) 50 mg in sodium chloride 0.9 % 250 mL infusion,  mcg/min, IntraVENous, Continuous, RASHARD Luis CNP, Last Rate: 3 mL/hr at 03/12/23 1508, 10 mcg/min at 03/12/23 1508    epoetin josé luis-epbx (RETACRIT) injection 3,000 Units, 3,000 Units, SubCUTAneous, Once per day on Mon Wed Fri, Fly Dopp, APRN - CNP, 3,000 Units at 03/13/23 3762    sertraline (ZOLOFT) tablet 50 mg, 50 mg, Oral, Nightly, Katheryn Daniel MD, 50 mg at 03/14/23 2106    rOPINIRole (REQUIP) tablet 1 mg, 1 mg, Oral, TID, Katheryn Daniel MD, 1 mg at 03/15/23 0931    carbidopa-levodopa (SINEMET CR)  MG per extended release tablet 2 tablet, 2 tablet, Oral, TID, Pineda Plummer MD, 2 tablet at 03/15/23 0932    metoprolol tartrate (LOPRESSOR) tablet 25 mg, 25 mg, Oral, BID, Pineda Plummer MD, 25 mg at 03/15/23 0931    fluconazole (DIFLUCAN) in 0.9 % sodium chloride IVPB 200 mg, 200 mg, IntraVENous, Q24H, Jose A Arnold MD, Last Rate: 100 mL/hr at 03/15/23 0937, 200 mg at 03/15/23 0937    glucose chewable tablet 16 g, 4 tablet, Oral, PRN, Freddy Morning MD Lizabeth    dextrose bolus 10% 125 mL, 125 mL, IntraVENous, PRN **OR** dextrose bolus 10% 250 mL, 250 mL, IntraVENous, PRN, Tali Barajas MD    glucagon (rDNA) injection 1 mg, 1 mg, SubCUTAneous, PRN, Tali Barajas MD    dextrose 10 % infusion, , IntraVENous, Continuous PRN, Tali Barajas MD    [facility] by provider] apixaban (ELIQUIS) tablet 5 mg, 5 mg, Oral, BID, Ally Avila MD, 5 mg at 03/11/23 2046    insulin lispro (HUMALOG) injection vial 0-4 Units, 0-4 Units, SubCUTAneous, Nightly, Tali Barajas MD, 4 Units at 03/11/23 2051    insulin glargine (LANTUS) injection vial 8 Units, 8 Units, SubCUTAneous, BID, Tali Barajas MD, 8 Units at 03/14/23 2104    insulin lispro (HUMALOG) injection vial 0-8 Units, 0-8 Units, SubCUTAneous, TID WC, Tali Barajas MD, 4 Units at 03/13/23 0701    QUEtiapine (SEROQUEL) tablet 25 mg, 25 mg, Oral, Nightly, RASHARD Rogel - CNP, 25 mg at 03/14/23 2106    sodium chloride flush 0.9 % injection 10 mL, 10 mL, IntraVENous, 2 times per day, Nobie Shone, MD, 10 mL at 03/15/23 0933    sodium chloride flush 0.9 % injection 10 mL, 10 mL, IntraVENous, PRN, Nobie Shone, MD, 10 mL at 03/11/23 1956    0.9 % sodium chloride infusion, , IntraVENous, PRN, Nobie Shone, MD, Last Rate: 25 mL/hr at 03/15/23 0307, New Bag at 03/15/23 0307    promethazine (PHENERGAN) tablet 12.5 mg, 12.5 mg, Oral, Q6H PRN, 12.5 mg at 03/14/23 1010 **OR** ondansetron (ZOFRAN) injection 4 mg, 4 mg, IntraVENous, Q6H PRN, Nobie Shone, MD, 4 mg at 03/12/23 1102    polyethylene glycol (GLYCOLAX) packet 17 g, 17 g, Oral, Daily PRN, Nobie Shone, MD    acetaminophen (TYLENOL) tablet 650 mg, 650 mg, Oral, Q6H PRN, 650 mg at 03/15/23 0308 **OR** acetaminophen (TYLENOL) suppository 650 mg, 650 mg, Rectal, Q6H PRN, Nobie Shone, MD    DOPamine (INTROPIN) 800 mg in dextrose 5 % 250 mL infusion, 1-20 mcg/kg/min, IntraVENous, Continuous, RASHARD Rogel CNP, Stopped at 03/11/23 1743    budesonide (PULMICORT) nebulizer suspension 500 mcg, 0.5 mg, Nebulization, BID, RASHARD Rogel CNP, 500 mcg at 03/15/23 0448    albuterol (PROVENTIL) nebulizer solution 2.5 mg, 2.5 mg, Nebulization, Q4H WA, 2.5 mg at 03/15/23 0937 **AND** ipratropium (ATROVENT) 0.02 % nebulizer solution 0.5 mg, 0.5 mg, Nebulization, Q4H WA, RASHARD Rogel CNP, 0.5 mg at 03/15/23 8910    cefepime (MAXIPIME) 1,000 mg in sodium chloride 0.9 % 50 mL IVPB ([id number]), 1,000 mg, IntraVENous, Q12H, Roopa Snyder MD, Stopped at 03/15/23 0730  DIAGNOSTIC RESULTS   Radiology:  CT CHEST WO CONTRAST    Result Date: 3/11/2023  EXAMINATION: CT OF THE CHEST WITHOUT CONTRAST 3/10/2023 4:39 pm TECHNIQUE: CT of the chest was performed without the administration of intravenous contrast. Multiplanar reformatted images are provided for review. Automated exposure control, iterative reconstruction, and/or weight based adjustment of the mA/kV was utilized to reduce the radiation dose to as low as reasonably achievable. COMPARISON: Radiographs of the same day. HISTORY: ORDERING SYSTEM PROVIDED HISTORY: Pleural effusion TECHNOLOGIST PROVIDED HISTORY: Reason for exam:->Pleural effusion FINDINGS: Mediastinum: Tunnel right jugular hemodialysis catheter tip well positioned proximal right atrium. Endotracheal tube tip with good position 2.6 cm above the juan francisco.   Gastric tube with good position proximal side hole beneath the hemidiaphragms. Normal caliber and contour of the thoracic aorta. No mediastinal adenopathy detected. Moderate cardiomegaly. Small pericardial effusion. Lungs/pleura: Moderate right and small left pleural effusions with associated compressive atelectasis. Mild re-expansion edema through the right lung, status post thoracentesis noted. Elevated right hemidiaphragm with moderate perihepatic ascites observed in the upper abdomen. Upper Abdomen: Moderate mainly perihepatic ascites. Gastric tube with good positioning. Soft Tissues/Bones: No acute osseous or body wall soft tissue abnormalities. 1. Moderate right and small left pleural effusions with associated compressive atelectasis. 2. Elevation of the right hemidiaphragm with underlying moderate perihepatic ascites. 3. Moderate cardiomegaly and small pericardial effusion. 4. Endotracheal tube, gastric tube, and right jugular tunneled implanted hemodialysis catheter with good positioning. RECOMMENDATIONS: Careful clinical correlation and follow up recommended. IR FLUORO GUIDED CVA DEVICE PLMT/REPLACE/REMOVAL    Result Date: 2/9/2023  PROCEDURE: IR FLUOROSCOPY GUIDED CENTRAL VENOUS ACCESS DEVICE PLACEMENT; US GUIDED VASCULAR ACCESS MODERATE CONSCIOUS SEDATION 2/9/2023 HISTORY: ORDERING SYSTEM PROVIDED HISTORY: Tunneled HD line along with separate iv access TECHNOLOGIST PROVIDED HISTORY: Reason for exam:->Tunneled HD line along with separate iv access TECHNIQUE: Ultrasound and fluoroscopic guided CONTRAST: None SEDATION: Moderate sedation was ordered and supervised by the attending with physician face-to-face monitoring. Medications were provided and recorded by Radiology nurses. The administration, documentation and monitoring of IV conscious sedation under my direct supervision for time frame of 20 minutes. 50 mcg of IV fentanyl was administered.   Interventional radiology nursing staff monitored the patient the throughout the exam. FLUOROSCOPY DOSE AND TYPE: Radiation Exposure Indices: Fluoroscopy time equals 0.3 minutes. Total dose equals 3.29 mGy DESCRIPTION OF PROCEDURE: Informed consent was obtained after a detailed explanation of the procedure including risks, benefits, and alternatives. Universal protocol was observed. Sterile gowns, masks, hats and gloves utilized for maximal sterile barrier. FINDINGS: The patient's neck was prepped and draped in a sterile fashion using maximum sterile barrier technique. Ultrasound images demonstrate a patent right internal jugular vein. Following the uneventful administration of lidocaine using ultrasound guidance I introduced a micro puncture needle into the right internal jugular vein. Through the micro needle a microwire was advanced. Over the microwire a micro sheath was placed. Through the micro sheath an Amplatz wire was advanced into the superior vena cava. 2 dilators were used to dilate a tract into the right internal jugular vein. I then anesthetized a tract along the chest wall measuring 10 cm. Using a blunt tunneling device I tunneled the catheter to the dilator within the right internal jugular vein. Over the Amplatz wire peel-away sheath was placed. Through the peel-away sheath the dual lumen 15 Ukrainian 28 cm dialysis catheter was placed. The catheter tip is positioned at the SVC right atrial junction. The patient tolerated the procedure well and there were no complications. The catheter was then secured to the skin with 2-0 silk suture. The incision overlying the right internal jugular vein was also sutured with 2-0 silk suture. Successful placement of a tunneled dialysis catheter via the right internal jugular vein. Administration of conscious sedation. XR CHEST PORTABLE    Result Date: 3/10/2023  EXAMINATION: ONE XRAY VIEW OF THE CHEST; ONE SUPINE XRAY VIEW(S) OF THE ABDOMEN 3/10/2023 5:49 am COMPARISON: 4 hours prior.  HISTORY: ORDERING SYSTEM PROVIDED HISTORY: ett placement TECHNOLOGIST PROVIDED HISTORY: Reason for exam:->ett placement; ORDERING SYSTEM PROVIDED HISTORY: Confirmation of course of NG/OG/NE tube and location of tip of tube TECHNOLOGIST PROVIDED HISTORY: Reason for exam:->Confirmation of course of NG/OG/NE tube and location of tip of tube Portable? ->Yes FINDINGS: Endotracheal tube tip with good positioning 3.7 cm above the juan francisco. Gastric tube with good position, proximal side hole beneath the hemidiaphragms. Right jugular hemodialysis catheter remains with good positioning. Obscuration of the right cardiac base and right hemidiaphragm. Improving aeration right base. Small right pleural effusion noted. No pneumothorax. Body wall soft tissues unremarkable. Osseous thorax intact. 1. Endotracheal tube and gastric tube with good positioning. 2. Right jugular hemodialysis catheter remains with good positioning. 3. Improved aeration of the right base with small right pleural effusion now evident. RECOMMENDATION: Careful clinical correlation and follow up recommended. XR CHEST PORTABLE    Result Date: 3/10/2023  EXAMINATION: ONE XRAY VIEW OF THE CHEST 3/9/2023 11:39 pm COMPARISON: Chest one view, 02/23/2023 HISTORY: ORDERING SYSTEM PROVIDED HISTORY: hypotension TECHNOLOGIST PROVIDED HISTORY: Reason for exam:->hypotension FINDINGS: Lines, tubes, and devices: There is stable position of right internal jugular tip localized at the atriocaval junction. Lungs and pleura: There is mild pulmonary vascular congestion and persistent small right pleural effusion. There is interval worsening atelectasis in the right middle lobe and right upper lobe. Right lower lobe atelectasis and/or pneumonitis persists. No pneumothorax. Cardiomediastinal silhouette: The mediastinum is stable. The heart size is normal. Bones and soft tissues: There are surgical clips in right axilla. Surgical clips are superimposed over right upper quadrant. Mild pulmonary vascular congestion.  Interval worsening of right upper lobe and right middle lobe atelectasis. Persistent right lower lobe atelectasis and/or pneumonitis. Small right pleural effusion.     XR CHEST PORTABLE    Result Date: 2/23/2023  EXAMINATION: ONE XRAY VIEW OF THE CHEST 2/23/2023 3:58 pm COMPARISON: February 23, 2023 HISTORY: ORDERING SYSTEM PROVIDED HISTORY: post procedure TECHNOLOGIST PROVIDED HISTORY: Reason for exam:->post procedure FINDINGS: Cardiomegaly.  Lungs clear with mild right basilar atelectasis.  No pneumothorax or effusion.  Osseous thorax intact.  Right jugular hemodialysis catheter tip remains well positioned at the cavoatrial junction.     No acute disease. RECOMMENDATION: Careful clinical correlation and follow up recommended.     XR CHEST PORTABLE    Result Date: 2/22/2023  EXAMINATION: ONE XRAY VIEW OF THE CHEST 2/22/2023 2:36 pm COMPARISON: 10/16/2023. HISTORY: ORDERING SYSTEM PROVIDED HISTORY: pleural effusion evaluation TECHNOLOGIST PROVIDED HISTORY: Reason for exam:->pleural effusion evaluation FINDINGS: The lungs are without acute focal process.  There is no effusion or pneumothorax.  Cardiomegaly.  The osseous structures are without acute process.  Right-sided central line catheter at the caval atrial junction.     No sizable pleural effusion.      XR ABDOMEN FOR NG/OG/NE TUBE PLACEMENT    Result Date: 3/10/2023  EXAMINATION: ONE XRAY VIEW OF THE CHEST; ONE SUPINE XRAY VIEW(S) OF THE ABDOMEN 3/10/2023 5:49 am COMPARISON: 4 hours prior. HISTORY: ORDERING SYSTEM PROVIDED HISTORY: ett placement TECHNOLOGIST PROVIDED HISTORY: Reason for exam:->ett placement; ORDERING SYSTEM PROVIDED HISTORY: Confirmation of course of NG/OG/NE tube and location of tip of tube TECHNOLOGIST PROVIDED HISTORY: Reason for exam:->Confirmation of course of NG/OG/NE tube and location of tip of tube Portable?->Yes FINDINGS: Endotracheal tube tip with good positioning 3.7 cm above the juan francisco.  Gastric tube with good position, proximal side hole beneath the  hemidiaphragms. Right jugular hemodialysis catheter remains with good positioning. Obscuration of the right cardiac base and right hemidiaphragm. Improving aeration right base. Small right pleural effusion noted. No pneumothorax. Body wall soft tissues unremarkable. Osseous thorax intact. 1. Endotracheal tube and gastric tube with good positioning. 2. Right jugular hemodialysis catheter remains with good positioning. 3. Improved aeration of the right base with small right pleural effusion now evident. RECOMMENDATION: Careful clinical correlation and follow up recommended. IR GUIDED THORACENTESIS PLEURAL    Result Date: 3/10/2023  PROCEDURE: ULTRASOUNDGUIDED RIGHT THORACENTESIS 3/10/2023 HISTORY: ORDERING SYSTEM PROVIDED HISTORY: right pleural effusion/right thoracentesis, send labs please TECHNOLOGIST PROVIDED HISTORY: right pleural effusion/right thoracentesis, send labs please Reason for exam:->right pleural effusion/right thoracentesis, send labs please Which side should the procedure be performed?->Right TECHNIQUE: Informed consent was obtained after a detailed explanation of the procedure including risks, benefits, and alternatives. Universal protocol was performed. The right chest was prepped and draped in sterile fashion and local anesthesia was achieved with lidocaine. An 8 Bulgarian needle sheath was advanced under ultrasound guidance into pleural effusion and thoracentesis was performed. The patient tolerated the procedure well. FINDINGS: A total of 1000 cc was removed. Successful ultrasound guided thoracentesis.        IR GUIDED PERC PLEURAL DRAIN W CATH INSERT    Result Date: 2/14/2023  PROCEDURE: IR PERC CATH PLEURAL DRAIN W/IMAG 2/14/2023 HISTORY: ORDERING SYSTEM PROVIDED HISTORY: Recurrent pleural effusion TECHNOLOGIST PROVIDED HISTORY: Reason for exam:->Recurrent pleural effusion Which side should the procedure be performed?->Right TECHNIQUE: Ultrasound-guided CONTRAST: None SEDATION: None FLUOROSCOPY DOSE AND TYPE: None DESCRIPTION OF PROCEDURE: Informed consent was obtained after a detailed explanation of the procedure including risks, benefits, and alternatives. Universal protocol was observed. Sterile gowns, masks, hats and gloves utilized for maximal sterile barrier. FINDINGS: Ultrasound images of the right pleural space were obtained Note is made of a large right pleural effusion The patient has right back was prepped and draped in a sterile fashion maximum sterile barrier technique. Following the uneventful administration of lidocaine I introduced a 5 [place] Y[place]h catheter into the pleural space. I then anesthetized a 10 cm tract along the posterior chest wall. Using a blunt tunneling device I tunneled the PleurX catheter to the Yueh catheter. Through the [facility] catheter an Amplatz wire was advanced into the pleural space. Over the Amplatz wire multiple dilators were used to dilate a tract into the right pleural space. The PleurX catheter was then introduced into the peel-away sheath and position within the pleural space. The patient tolerated the procedure well and no complications. A sterile dressing was placed over the entry sites. 1. Successful placement of a PleurX catheter within the right pleural space 2. 1000 cc of fluid was aspirated. IR REMOVAL OF TUNNELED PLEURAL CATH W CUFF    Result Date: 2/23/2023  PROCEDURE: IR REMOVAL TUNNELED PLEURAL CATHETER 2/23/2023 HISTORY: ORDERING SYSTEM PROVIDED HISTORY: PleuRX Catheter removal TECHNOLOGIST PROVIDED HISTORY: Reason for exam:->PleuRX Catheter removal Which side should the procedure be performed?->Radiologist Recommendation TECHNIQUE: Surgical removal of the indwelling PleurX catheter CONTRAST: None SEDATION: None FLUOROSCOPY DOSE AND TYPE: None DESCRIPTION OF PROCEDURE: Informed consent was obtained after a detailed explanation of the procedure including risks, benefits, and alternatives. Universal protocol was observed. Sterile gowns, masks, hats and gloves utilized for maximal sterile barrier. FINDINGS: The patient's right side was prepped and draped in a sterile fashion maximum sterile barrier technique. Following the uneventful administration of lidocaine I manual remove the indwelling PleurX catheter. A sterile dressing was placed over the insertion site. Successful removal of the indwelling right PleurX catheter. Imaging and labs were reviewed per medical records. Thank you for involving me in the care of Sebas Downey I will continue to follow. Please do not hesitate to call [phone] for any questions or concerns.     Electronically signed by RASHARD Arellano on 3/15/2023 at 11:28 AM

## 2023-03-15 NOTE — PROGRESS NOTES
Physical Therapy    Physical Therapy Treatment Note/Plan of Care    Room #:  8294/1003-01  Patient Name: Aleta Olivia  YOB: 1950  MRN: [id number]    Date of Service: 3/15/2023     Tentative placement recommendation: Mod[place] Chamberlain with Physical Therapy   Patient may be unsafe for ambulette due to impaired seated balance, sitting endurance  Equipment recommendation: Equipment at Nursing Home      Evaluating Physical Therapist: Jamee Murphy  #[id number]      Specific Provider Orders/Date/Referring Provider :  03/11/23 1915    PT eval and treat  Start:  03/11/23 1915,   End:  03/11/23 1915,   ONE TIME,   Standing Count:  1 Occurrences,   R         La Joy, APRN - CNP     Admitting Diagnosis:   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]  Elevated troponin [R77.8]  Acute respiratory failure with hypercapnia (HCC) [J96.02]  Chronic renal failure, stage 4 (severe) (HCC) [N18.4]  Hypotension, unspecified hypotension type [I95.9]  Acute on chronic congestive heart failure, unspecified heart failure type ([facility] [I50.9]    Admitted with    low heart rate, hypotension and weakness, critical labs; uti, post blood transfusion  Intubated 3/10-3/11  S/p thoracentesis  Surgery: none  Visit Diagnoses         Codes    Chronic renal failure, stage 4 (severe) (HCC)     N18.4    Elevated troponin     R77.8    Postprocedural pneumothorax     J95.811            Patient Active Problem List   Diagnosis    Depression with anxiety    Osteoporosis    Asthma    Hyperlipidemia    Mitral regurgitation    Obstructive sleep apnea syndrome    Psoriasis    Diabetes mellitus ([facility]    Parkinson's disease ([facility]    Primary hypertension    Microalbuminuria    Morbid obesity ([facility]    RLS (restless legs syndrome)    Generalized weakness    Inability to walk    Hypothyroidism    Chest pain    Acute asthma exacerbation    Asthma exacerbation, mild    Paroxysmal atrial fibrillation (HCC)    Atrial fibrillation with rapid ventricular response ([address]    Acute on chronic congestive heart failure ([address]    Coronary artery disease involving native coronary artery of native heart without angina pectoris    Dysphagia    Hepatosplenomegaly    Acute decompensated heart failure (HCC)    Acute diastolic (congestive) heart failure (HCC)    Nonrheumatic tricuspid valve regurgitation    Tobacco abuse    Recurrent syncope    Septic shock (HCC)    Seizure-like activity (HCC)    Acute kidney injury ([address]    Pancytopenia (HCC)    Thrombocytopenia (HCC)    Encephalopathy    Acute respiratory failure with hypoxia (HCC)    Lactic acidosis    Delirium    Acute on chronic anemia    Pleural effusion, right    Acute respiratory failure with hypoxia and hypercapnia (HCC)    Acute confusion    Acute on chronic diastolic heart failure (HCC)    Macrocytosis    Other specified anemias    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[address]    Puerperal sepsis with acute hypoxic respiratory failure without septic shock (HCC)    Pulmonary HTN (HCC)    Chronic anticoagulation    RVF (right ventricular failure) (HCC)    Metabolic alkalosis    Sepsis (HCC)    COPD exacerbation (HCC)    Acute on chronic respiratory failure with hypercapnia (HCC)    Persistent atrial fibrillation (HCC)    Hypercapnic respiratory failure (HCC)    Acute on chronic respiratory failure (HCC)    Hyperkalemia    Heart failure (HCC)    Acute renal insufficiency    Hypotension    Anemia    Acute on chronic respiratory failure with hypoxia and hypercapnia (HCC)    Palliative care encounter    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 Decreased strength, balance and endurance  increases patient's risk for fall.  Pt wanted to attempt to stand but was unable to on 2 attempts with 2 person assist. Pt able to sit EOB and perform seated exercises with VC for technique. PHYSICAL THERAPY  PLAN OF CARE       Physical therapy plan of care is established based on physician order,  patient diagnosis and clinical assessment    Current Treatment Recommendations:    -Bed Mobility: Lower extremity exercises , Upper extremity exercises , and Trunk control activities   -Sitting Balance: Incorporate reaching activities to activate trunk muscles , Hands on support to maintain midline , Facilitate active trunk muscle engagement , Facilitate postural control in all planes , and Engage in core activities to allow for movement within base of support   -Transfers: Provide instruction on proper hand and foot position for adequate transfer of weight onto lower extremities and use of gait device if needed and Cues for hand placement, technique and safety. Provide stabilization to prevent fall   -Endurance: Utilize Supervised activities to increase level of endurance to allow for safe functional mobility including transfers and gait     PT long term treatment goals are located in below grid    Patient and or family understand(s) diagnosis, prognosis, and plan of care. Frequency of treatments: Patient will be seen  3-5 times/week.          Prior Level of Function: Patient performing transfers in therapy per patient  Rehab Potential: fair   for baseline    Past medical history:   Past Medical History:   Diagnosis Date    A-fib ([facility]     Acute on chronic congestive heart failure (HCC)     Anxiety     Asthma     CAD (coronary artery disease) 01/21/2016    Cancer ([facility]  breast ca 2006    right lumpectomy    Chronic kidney disease     nephrolithiasis    COPD exacerbation ([facility] 10/12/2022    Depression     Diabetes mellitus ([facility]     H/O mammogram     Hemodialysis patient [facility])     Hx MRSA infection     toe infection january 2012    Hyperlipidemia     Hypertension     Lateral epicondylitis     Morbid obesity (HCC)     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 Wes Linn  01/11/2022    Dr. Alfie Lindsey  7/29/15    CT GUIDED CHEST TUBE  7/8/2022    CT GUIDED CHEST TUBE 7/8/2022 Laura Celaya MD [facility]    ENDOSCOPY, COLON, DIAGNOSTIC  7/19/15    GALLBLADDER SURGERY      IR PERC CATH PLEURAL DRAIN W/IMAG  2/14/2023    IR PERC CATH PLEURAL DRAIN W/IMAG 2/14/2023 [facility]    IR REMOVAL OF TUNNELED PLEURAL CATH W CUFF  2/23/2023    IR REMOVAL OF TUNNELED PLEURAL CATH W CUFF 2/23/2023 [facility]    LUMBAR LAMINECTOMY      TOE AMPUTATION      TONSILLECTOMY      UPPER GASTROINTESTINAL ENDOSCOPY      UPPER GASTROINTESTINAL ENDOSCOPY N/A 7/19/2022    EGD ESOPHAGOGASTRODUODENOSCOPY performed by Ricardo Boucher MD at [address] [place] [address]:    Precautions: Bedrest with bathroom Privileges , falls, alarm, O2, and contact isolation , vre, 2 Liters of o2 via nasal cannula   Femoral line    Social history: Patient lives in a skilled nursing facility      Equipment owned: Wheelchair, Wheeled Walker, and O2,       [address] [facility]   How much help is needed turning from your back to your side while in a flat bed without using bedrails?: A Lot  How much help is needed moving from lying on your back to sitting on the side of a flat bed without using bedrails?: A Lot  How much help is needed moving to and from a bed to a chair?: Total  How much help is needed standing up from a chair using your arms?: Total  How much help is needed walking in hospital room?: Total  How much help is needed climbing 3-5 steps with a railing?: Total  AM-PAC Inpatient Mobility Raw Score : 8  AM-PAC Inpatient T-Scale Score : 28.52  Mobility Inpatient CMS 0-100% Score: 86.62  Mobility Inpatient CMS G-Code Modifier :     Nursing cleared patient for PT treatment. OBJECTIVE;   Initial Evaluation  Date: 3/12/2023 Treatment Date:  3/15/2023     Short Term/ Long Term   Goals   Was pt agreeable to Eval/treatment? Yes Yes  To be met in 5 days   Pain level   0/10    0/10    Bed Mobility    Using rails and head of bed elevated    Rolling: Maximal assist of 1    Supine to sit: Maximal assist of  2    Sit to supine: Maximal assist of  2    Scooting: Maximal assist of  2   Rolling: Moderate assist of 1   Supine to sit: Moderate assist of 1   Sit to supine: Moderate assist of 1   Scooting: Maximal assist of  2 with chux at edge of bed and max of 2 towards head of bed. Rolling: Moderate assist of 1    Supine to sit: Moderate assist of 1    Sit to supine: Moderate assist of 1    Scooting: Moderate assist of 1     Transfers Sit to stand: Not assessed due to pt overall debility, decreased activity tolerance, balance deficits, safety and fall risk. not assessed due to patient's overall debility, decreased activity tolerance, balance deficits, safety and fall risk. Did educate patient on mariano stedy d/t patient fearful of falling. Sit to stand:  Moderate assist of  2 using mariano stedy   ROM Within functional limits        Strength BUE:   3+/5  RLE:  3-/5  LLE:  3-/5  Increase strength in affected mm groups by 1/3 grade   Balance Sitting EOB:  poor left posterior lean  Sitting EOB: fair        Sitting EOB:  fair        Patient is Alert & Oriented x person and situation and follows one step directions  Short term memory deficit   Sensation:  Patient  denies numbness/tingling   Edema:  yes bilateral lower extremities and bilateral upper extremities   Endurance: poor+ fatigues easily, Vitals:  2 liters nasal cannula   Blood Pressure at rest  Blood Pressure during session    Heart Rate at rest  Heart Rate during session with activity at edge of bed   SPO2 at rest %  SPO2 during session 96-98%     Patient education  Patient educated on role of Physical Therapy, risks of immobility, safety and plan of care, importance of positional changes for oxygen exchange,  importance of mobility while in hospital , and safety      Patient response to education:   Pt verbalized understanding Pt demonstrated skill Pt requires further education in this area   Yes Partial Yes      Treatment:  Patient practiced and was instructed/facilitated in the following treatment: Patient   Sat edge of bed 20 minutes with Supervision  to increase dynamic sitting balance and activity tolerance. Pt performed bed mobility, sat EOB and performed seated exercises, scooted toward HOB. Therapeutic Exercises:  ankle pumps, heel raises, hip abduction/adduction, long arc quad, and seated marching  x 15 reps. At end of session, patient in bed with alarm and  aid present  call light and phone within reach,  all lines and tubes intact, nursing notified. Patient would benefit from continued skilled Physical Therapy to improve functional independence and quality of life.          Patient's/ family goals   get stronger    Time in 859  Time out  927    Total Treatment Time  28 minutes      CPT codes:    Therapeutic activities (67186)   15 minutes  1 unit(s)  Therapeutic exercises (36717)   13 minutes  1 unit(s)    Victor Hugo Bowers PT  JOSELITO#[id number]

## 2023-03-15 NOTE — FLOWSHEET NOTE
Inpatient Wound Care    Admit Date: 3/9/2023 11:15 PM    Reason for consult:  right groin    Significant history:    Past Medical History:   Diagnosis Date    [facility] ([facility]     Acute on chronic congestive heart failure (HCC)     Anxiety     Asthma     CAD (coronary artery disease) 01/21/2016    Cancer ([facility]  breast ca 2006    right lumpectomy    Chronic kidney disease     nephrolithiasis    COPD exacerbation ([facility] 10/12/2022    Depression     Diabetes mellitus ([facility]     H/O mammogram     Hemodialysis patient ([facility]     Hx MRSA infection     toe infection january 2012    Hyperlipidemia     Hypertension     Lateral epicondylitis     Morbid obesity ([facility]     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    SERENA on CPAP     Oxygen dependent     Parkinson's disease ([facility]     Thyroid disease     Tubal ligation status     VRE (vancomycin-resistant Enterococci) infection 01/20/2023    urine       Wound history:      Findings:     03/15/23 1151   Wound 03/10/23 Groin Right; Inner; Upper   Date First Assessed/Time First Assessed: 03/10/23 0938   Present on Hospital Admission: Yes  Primary Wound Type: Pressure Injury  Location: Groin  Wound Location Orientation: Right; Inner; Upper   Wound Image    Wound Etiology Deep tissue/Injury   Wound Length (cm) 0.8 cm   Wound Width (cm) 15 cm   Wound Surface Area (cm^2) 12 cm^2   Change in Wound Size % (l*w) 0   Wound Assessment Purple/maroon   Drainage Amount None       Impression:  deep tissue injury    Interventions in place:  open to air    Plan:  Xeroform dressing dsd daily      Grace Storey RN 3/15/2023 11:54 AM No

## 2023-03-15 NOTE — FLOWSHEET NOTE
03/15/23 1739   Vital Signs   /64   Temp 98.1 °F (36.7 °C)   Heart Rate 78   Resp 20   Weight 212 lb 11.9 oz (96.5 kg)   Percent Weight Change -0.86   Pain Assessment   Pain Assessment None - Denies Pain   Post-Hemodialysis Assessment   Post-Treatment Procedures Blood returned;Catheter capped, clamped with Citrate x 2 ports   Machine Disinfection Process Acid/Vinegar Clean;Heat Disinfect   Rinseback Volume (ml) 300 ml   Blood Volume Processed (Liters) 79.8 l/min   Dialyzer Clearance Lightly streaked   Duration of Treatment (minutes) 210 minutes   Hemodialysis Intake (ml) 300 ml   Hemodialysis Output (ml) 3300 ml   NET Removed (ml) 3000   Patient Response to Treatment tOLERATED TX WELL.    Bilateral Breath Sounds Diminished   RUE Edema Trace   LUE Edema Trace   RLE Edema +1   LLE Edema +1

## 2023-03-15 NOTE — CARE COORDINATION
3-15-Cm note: [facility] started [employer] today, will await PRECERT, pt will need signed [place], rapid covid test , Kenyonbuffy Champagne will be transferred from [facility] skilled .  Electronically signed by Jose C Xie RN on 3/15/2023 at 3:11 PM

## 2023-03-16 ENCOUNTER — APPOINTMENT (OUTPATIENT)
Dept: GENERAL RADIOLOGY | Age: 73
End: 2023-03-16
Payer: MEDICARE

## 2023-03-16 VITALS
HEIGHT: 60 IN | HEART RATE: 101 BPM | BODY MASS INDEX: 41.77 KG/M2 | OXYGEN SATURATION: 100 % | SYSTOLIC BLOOD PRESSURE: 116 MMHG | DIASTOLIC BLOOD PRESSURE: 62 MMHG | TEMPERATURE: 98.9 F | WEIGHT: 212.74 LBS | RESPIRATION RATE: 18 BRPM

## 2023-03-16 LAB
ALBUMIN SERPL-MCNC: 3.7 G/DL (ref 3.5–5.2)
ALP SERPL-CCNC: 67 U/L (ref 35–104)
ALT SERPL-CCNC: <5 U/L (ref 0–32)
ANION GAP SERPL CALCULATED.3IONS-SCNC: 9 MMOL/L (ref 7–16)
ANISOCYTOSIS: ABNORMAL
AST SERPL-CCNC: 7 U/L (ref 0–31)
BACTERIA BLD CULT: NORMAL
BACTERIA BLD CULT: NORMAL
BASOPHILS # BLD: 0.01 E9/L (ref 0–0.2)
BASOPHILS NFR BLD: 0.1 % (ref 0–2)
BILIRUB DIRECT SERPL-MCNC: 0.3 MG/DL (ref 0–0.3)
BILIRUB INDIRECT SERPL-MCNC: 0.3 MG/DL (ref 0–1)
BILIRUB SERPL-MCNC: 0.6 MG/DL (ref 0–1.2)
BUN SERPL-MCNC: 26 MG/DL (ref 6–23)
CALCIUM SERPL-MCNC: 8.3 MG/DL (ref 8.6–10.2)
CHLORIDE SERPL-SCNC: 98 MMOL/L (ref 98–107)
CO2 SERPL-SCNC: 28 MMOL/L (ref 22–29)
CREAT SERPL-MCNC: 1.5 MG/DL (ref 0.5–1)
EOSINOPHIL # BLD: 0.08 E9/L (ref 0.05–0.5)
EOSINOPHIL NFR BLD: 1 % (ref 0–6)
ERYTHROCYTE [DISTWIDTH] IN BLOOD BY AUTOMATED COUNT: 18.2 FL (ref 11.5–15)
GLUCOSE SERPL-MCNC: 154 MG/DL (ref 74–99)
HCT VFR BLD AUTO: 31.7 % (ref 34–48)
HGB BLD-MCNC: 8.5 G/DL (ref 11.5–15.5)
HYPOCHROMIA: ABNORMAL
IMM GRANULOCYTES # BLD: 0.05 E9/L
IMM GRANULOCYTES NFR BLD: 0.6 % (ref 0–5)
LYMPHOCYTES # BLD: 0.95 E9/L (ref 1.5–4)
LYMPHOCYTES NFR BLD: 11.5 % (ref 20–42)
MAGNESIUM SERPL-MCNC: 1.9 MG/DL (ref 1.6–2.6)
MAGNESIUM SERPL-MCNC: 2.1 MG/DL (ref 1.6–2.6)
MCH RBC QN AUTO: 24.6 PG (ref 26–35)
MCHC RBC AUTO-ENTMCNC: 26.8 % (ref 32–34.5)
MCV RBC AUTO: 91.9 FL (ref 80–99.9)
METER GLUCOSE: 123 MG/DL (ref 74–99)
MONOCYTES # BLD: 0.37 E9/L (ref 0.1–0.95)
MONOCYTES NFR BLD: 4.5 % (ref 2–12)
NEUTROPHILS # BLD: 6.78 E9/L (ref 1.8–7.3)
NEUTS SEG NFR BLD: 82.3 % (ref 43–80)
OVALOCYTES: ABNORMAL
PHOSPHATE SERPL-MCNC: 2.2 MG/DL (ref 2.5–4.5)
PLATELET # BLD AUTO: 104 E9/L (ref 130–450)
PMV BLD AUTO: 12 FL (ref 7–12)
POIKILOCYTES: ABNORMAL
POLYCHROMASIA: ABNORMAL
POTASSIUM SERPL-SCNC: 3.6 MMOL/L (ref 3.5–5)
PROT SERPL-MCNC: 5.5 G/DL (ref 6.4–8.3)
RBC # BLD AUTO: 3.45 E12/L (ref 3.5–5.5)
SARS-COV-2 RDRP RESP QL NAA+PROBE: NOT DETECTED
SCHISTOCYTES: ABNORMAL
SODIUM SERPL-SCNC: 135 MMOL/L (ref 132–146)
STOMATOCYTES: ABNORMAL
TEAR DROP CELLS: ABNORMAL
WBC # BLD: 8.2 E9/L (ref 4.5–11.5)

## 2023-03-16 PROCEDURE — 6360000002 HC RX W HCPCS: Performed by: CLINICAL NURSE SPECIALIST

## 2023-03-16 PROCEDURE — 6370000000 HC RX 637 (ALT 250 FOR IP): Performed by: INTERNAL MEDICINE

## 2023-03-16 PROCEDURE — 80076 HEPATIC FUNCTION PANEL: CPT

## 2023-03-16 PROCEDURE — 82962 GLUCOSE BLOOD TEST: CPT

## 2023-03-16 PROCEDURE — A4216 STERILE WATER/SALINE, 10 ML: HCPCS

## 2023-03-16 PROCEDURE — 2580000003 HC RX 258: Performed by: INTERNAL MEDICINE

## 2023-03-16 PROCEDURE — 84100 ASSAY OF PHOSPHORUS: CPT

## 2023-03-16 PROCEDURE — 2580000003 HC RX 258

## 2023-03-16 PROCEDURE — C9113 INJ PANTOPRAZOLE SODIUM, VIA: HCPCS

## 2023-03-16 PROCEDURE — 2700000000 HC OXYGEN THERAPY PER DAY

## 2023-03-16 PROCEDURE — 71045 X-RAY EXAM CHEST 1 VIEW: CPT

## 2023-03-16 PROCEDURE — 85025 COMPLETE CBC W/AUTO DIFF WBC: CPT

## 2023-03-16 PROCEDURE — 94660 CPAP INITIATION&MGMT: CPT

## 2023-03-16 PROCEDURE — 6360000002 HC RX W HCPCS

## 2023-03-16 PROCEDURE — 2580000003 HC RX 258: Performed by: CLINICAL NURSE SPECIALIST

## 2023-03-16 PROCEDURE — 97110 THERAPEUTIC EXERCISES: CPT

## 2023-03-16 PROCEDURE — 94640 AIRWAY INHALATION TREATMENT: CPT

## 2023-03-16 PROCEDURE — 99239 HOSP IP/OBS DSCHRG MGMT >30: CPT | Performed by: INTERNAL MEDICINE

## 2023-03-16 PROCEDURE — 83735 ASSAY OF MAGNESIUM: CPT

## 2023-03-16 PROCEDURE — 36415 COLL VENOUS BLD VENIPUNCTURE: CPT

## 2023-03-16 PROCEDURE — 80048 BASIC METABOLIC PNL TOTAL CA: CPT

## 2023-03-16 PROCEDURE — 87635 SARS-COV-2 COVID-19 AMP PRB: CPT

## 2023-03-16 RX ORDER — LEVOTHYROXINE SODIUM 175 UG/1
175 TABLET ORAL DAILY
Qty: 30 TABLET | Refills: 3 | Status: SHIPPED | OUTPATIENT
Start: 2023-03-17

## 2023-03-16 RX ORDER — QUETIAPINE FUMARATE 25 MG/1
25 TABLET, FILM COATED ORAL NIGHTLY
Qty: 60 TABLET | Refills: 0 | Status: SHIPPED | OUTPATIENT
Start: 2023-03-16

## 2023-03-16 RX ADMIN — BUDESONIDE 500 MCG: 0.5 SUSPENSION RESPIRATORY (INHALATION) at 06:14

## 2023-03-16 RX ADMIN — ALBUTEROL SULFATE 2.5 MG: 2.5 SOLUTION RESPIRATORY (INHALATION) at 06:14

## 2023-03-16 RX ADMIN — PREDNISONE 5 MG: 5 TABLET ORAL at 08:41

## 2023-03-16 RX ADMIN — CARBIDOPA AND LEVODOPA 2 TABLET: 25; 100 TABLET, EXTENDED RELEASE ORAL at 08:41

## 2023-03-16 RX ADMIN — Medication 40 MG: at 06:39

## 2023-03-16 RX ADMIN — IPRATROPIUM BROMIDE 0.5 MG: 0.5 SOLUTION RESPIRATORY (INHALATION) at 06:14

## 2023-03-16 RX ADMIN — IPRATROPIUM BROMIDE 0.5 MG: 0.5 SOLUTION RESPIRATORY (INHALATION) at 09:36

## 2023-03-16 RX ADMIN — CEFEPIME 2000 MG: 2 INJECTION, POWDER, FOR SOLUTION INTRAVENOUS at 00:10

## 2023-03-16 RX ADMIN — ROPINIROLE HYDROCHLORIDE 1 MG: 1 TABLET, FILM COATED ORAL at 08:41

## 2023-03-16 RX ADMIN — Medication 10 ML: at 00:30

## 2023-03-16 RX ADMIN — ALBUTEROL SULFATE 2.5 MG: 2.5 SOLUTION RESPIRATORY (INHALATION) at 09:36

## 2023-03-16 RX ADMIN — METOPROLOL TARTRATE 25 MG: 25 TABLET, FILM COATED ORAL at 08:41

## 2023-03-16 RX ADMIN — INSULIN GLARGINE 8 UNITS: 100 INJECTION, SOLUTION SUBCUTANEOUS at 08:41

## 2023-03-16 RX ADMIN — LEVOTHYROXINE SODIUM 175 MCG: 0.05 TABLET ORAL at 06:39

## 2023-03-16 RX ADMIN — Medication 10 ML: at 08:42

## 2023-03-16 NOTE — PROGRESS NOTES
[facility] MD Meaghan James, MD Vladimir Gamble, MD Orin Conroy, CNP   Joyce Devlin, NUNU Archuleta, BRADLEY  Progress Note    3/16/2023    SUBJECTIVE:   3/11: Seen in the ICU. Mechanically ventilated and sedated. FiO2 35 % PEEP 5. She opens eyes to voice, does not follow commands. Tolerated HD yesterday without complications. TSH improving. On dopamine infusion. 3/12: Seen in the ICU. Extubated yesterday, now on nasal canula. No acute complaints. Confused. Denies chest pain, dyspnea, or palpitations. BP low this morning with elevated heart rate. Hgb decreased from 10.2-->7.2. receiving 1 unit PRBC. Denies hematochezia. On amrit. 3/13: Seen in the ICU. Sitting up eating breakfast. Blood pressure has improved, off pressors. Mentation improving. 300 cc urine output yesterday. She is on nasal canula. CT abdomen negative for retroperitoneal hematoma. 3/14: Feeling better than yesterday. Seen while performing physical therapy. Energy level much improved, fatigue improving, still generally weak but making effort for improvement. O2 weaned to 1 L per nasal cannula. 3/15: Resting comfortably. Enjoying her dinner. Dialysis today without event. 3.3 L UF. BP stable throughout treatment. Blood pressure has been better. Feeling better overall. Remains oliguric. 3/16: Seen while sitting up in bed. No acute distress. She is being discharged later this afternoon. Denies chest pain, dyspnea, or palpitations. Tolerating HD without complications. PROBLEM LIST:    Principal Problem:    Myxedema coma ([facility]  Resolved Problems:    * No resolved hospital problems. *         DIET:    ADULT ORAL NUTRITION SUPPLEMENT; Breakfast, Lunch; Low Calorie/High Protein Oral Supplement  ADULT DIET; Dysphagia - Soft and Bite Sized; 4 carb choices (60 gm/meal); Low Sodium (2 gm); 1800 ml;  No Drinking Straws     MEDS (scheduled):    fluconazole  100 mg Oral Nightly    cefepime 2,000 mg IntraVENous Once per day on Mon Wed Fri    pantoprazole (PROTONIX) 40 mg injection  40 mg IntraVENous Q12H    levothyroxine  175 mcg Oral Daily    predniSONE  5 mg Oral Daily    epoetin josé luis-epbx  3,000 Units SubCUTAneous Once per day on Mon Wed Fri    sertraline  50 mg Oral Nightly    rOPINIRole  1 mg Oral TID    carbidopa-levodopa  2 tablet Oral TID    metoprolol tartrate  25 mg Oral BID    [Held by provider] apixaban  5 mg Oral BID    insulin lispro  0-4 Units SubCUTAneous Nightly    insulin glargine  8 Units SubCUTAneous BID    insulin lispro  0-8 Units SubCUTAneous TID     QUEtiapine  25 mg Oral Nightly    sodium chloride flush  10 mL IntraVENous 2 times per day    budesonide  0.5 mg Nebulization BID    albuterol  2.5 mg Nebulization Q4H WA    And    ipratropium  0.5 mg Nebulization Q4H WA       MEDS (infusions):   sodium chloride      phenylephrine (JIMENA-SYNEPHRINE) 50 mg/250 mL infusion 10 mcg/min (03/12/23 1508)    dextrose      sodium chloride 25 mL/hr at 03/15/23 0307    DOPamine Stopped (03/11/23 1743)       MEDS (prn):  sodium chloride, glucose, dextrose bolus **OR** dextrose bolus, glucagon (rDNA), dextrose, sodium chloride flush, sodium chloride, promethazine **OR** ondansetron, polyethylene glycol, acetaminophen **OR** acetaminophen    PHYSICAL EXAM:     Patient Vitals for the past 24 hrs:   BP Temp Temp src Pulse Resp SpO2 Weight   03/16/23 0936 -- -- -- -- -- 100 % --   03/16/23 0637 116/62 -- -- (!) 101 18 100 % --   03/16/23 0615 -- -- -- -- -- 100 % --   03/16/23 0109 -- -- -- -- 16 -- --   03/15/23 2202 116/76 98.9 °F (37.2 °C) Infrared 90 18 100 % --   03/15/23 1739 125/64 98.1 °F (36.7 °C) -- 78 20 -- 212 lb 11.9 oz (96.5 kg)   03/15/23 1700 118/64 -- -- 78 -- -- --   03/15/23 1630 126/65 -- -- 78 -- -- --   03/15/23 1600 (!) 98/48 -- -- 86 -- -- --   03/15/23 1530 (!) 97/56 -- -- 82 -- -- --   03/15/23 1500 115/66 -- -- 87 -- -- --   03/15/23 1430 131/67 -- -- 73 -- -- -- @      Intake/Output Summary (Last 24 hours) at 3/16/2023 1415  Last data filed at 3/16/2023 1057  Gross per 24 hour   Intake 701 ml   Output 3300 ml   Net -2599 ml           Wt Readings from Last 3 Encounters:   03/15/23 212 lb 11.9 oz (96.5 kg)   02/24/23 214 lb 15.2 oz (97.5 kg)   01/16/23 259 lb (117.5 kg)       Constitutional: in no acute distress, confused   HEENT: NC/AT, EOMI, sclera and conjunctiva are clear and anicteric, mucus membranes moist.  Neck: Trachea midline, no JVD. RIJ tesio  Cardiovascular: S1, S2 regular rhythm, no murmur,or rub  Respiratory:  CTAB. No crackles, no wheeze  Gastrointestinal:  Soft, nontender, distended, abdomen and abdominal panus swelling has improved (+1) NABS  Ext: trace dependent edema with skin wrinkling, feet warm  Skin: dry, no rash  Neuro: slight confusion       DATA:    Recent Labs     03/14/23  2308 03/15/23  0613 03/16/23  1041   WBC 8.8 6.2 8.2   HGB 8.4* 7.9* 8.5*   HCT 31.9* 29.6* 31.7*   MCV 92.5 91.9 91.9   PLT 92* 93* 104*       Recent Labs     03/14/23  0538 03/15/23  0613 03/16/23  0205 03/16/23  1041    136  --  135   K 3.9 3.9  --  3.6   CL 97* 99  --  98   CO2 28 26  --  28   MG 1.8 1.5* 2.1 1.9   PHOS 2.5 2.5  --  2.2*   BUN 29* 41*  --  26*   CREATININE 1.7* 2.3*  --  1.5*   ALT  --  <5  --  <5   AST  --  6  --  7   BILIDIR  --  0.4*  --  0.3   BILITOT  --  0.7  --  0.6   ALKPHOS  --  57  --  67         Lab Results   Component Value Date    LABPROT 0.5 (H) 01/18/2023    LABPROT 0.5 01/18/2023          Assessment  Acute on chronic kidney disease requiring initiation of hemodialysis during a recent hospitalization. Hemodialysis on Mondays, Wednesdays, and Fridays. Chronic kidney disease stage III, baseline creatinine 1.4-1.7 mg/dL secondary to diabetic nephropathy and renal microvascular atherosclerotic disease.    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     Recommendations  Ok to discharge from renal standpoint  Hemodialysis tomorrow at [facility] signed by RASHARD Lewis CNP on 3/16/2023

## 2023-03-16 NOTE — DISCHARGE SUMMARY
[facility] Physician Discharge Summary       No follow-up provider specified. Activity level: Slowly increase as tolerated    Diet: ADULT ORAL NUTRITION SUPPLEMENT; Breakfast, Lunch; Low Calorie/High Protein Oral Supplement  ADULT DIET; Dysphagia - Soft and Bite Sized; 4 carb choices (60 gm/meal); Low Sodium (2 gm); 1800 ml; No Drinking Straws    Labs: None are pending at the discharge    Condition at discharge: Stable    Dispo: Return to home setting     Patient ID:  Sebas Downey  [id number]  66 y.o.  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 *      Consults:  IP CONSULT TO CRITICAL CARE  IP CONSULT TO INTERNAL MEDICINE  PHARMACY TO DOSE VANCOMYCIN  IP CONSULT TO INFECTIOUS DISEASES  IP CONSULT TO DIETITIAN    Procedures: None significant except if described in hospital course. Hospital Course: Part of History of present illness from History and Physical:  presented with SOB, hypotension  for last few days prior to arrival to the hospital. SOB is associated with some cough, nonproductive but no fever or chills reported. Initially SOB was mild, intermittent but gradually getting more persistent. Started gradually. Exacerbated by general activity or exertion. Relieved only partially by rest. In ED patient was found to be hypotensive with Afib RVR and respiratory failure and was placed on BIPAP     Update: Admitted with altered mentation. 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 She was intubated, started on pressors and Solu-Cortef. She was extubated on 3/11. Gradual clinical improvement has been noted since admission.  Patient is anemic and no source of bleeding has been found yet. Myxedema coma with metabolic encephalopathy: improved since IV synthroid was administered. Changed to po. Tsh 3/12: 26.9; 3/13 11.6  Acute on chronic Anemia: CKD related. Transfused. stable  ESRD on HD: per Nephro  Atrial Fibrillation: poorly controlled this morning. Resume home medications  Thrombocytopenia: related to possible heparin used. Added as allergy and started on eliquis on 3/11  DM type 2: insulin as ordered  UTI: positive for Enterococcus Cloacae: abx per ID: Cefepime q  M-W-F  through 4/1/2023   diflucan PO arranged by id  Acute on chronic respiratory failure with hypercapnia: continue supplemental oxygen and bipap per pulmonary  Pleural Effusion: overall stable. Thoracentesis was last admission. Congestive heart failure: stable. Continue current care. DVT prophylaxis: Lovenox while here. Reassess at facility        Discharge Exam:  Vitals:    03/16/23 0109 03/16/23 0615 03/16/23 0637 03/16/23 0936   BP:   116/62    Pulse:   (!) 101    Resp: 16  18    Temp:       TempSrc:       SpO2:  100% 100% 100%   Weight:       Height:           No acute distress moist membranes   Normocephalic, without obvious abnormality, atraumatic, external ears without lesions,   Neck supple no cervical lymphadenopathy  Heart has a regular rate and rhythm no murmur  Lungs are clear to auscultation bilaterally with equal movements. Abdomen is soft nontender nondistended no rebound or guarding. No  significant peripheral edema good peripheral perfusion. No significant rashes or new skin lesions. No new focality on neuro exam which is overall grossly intact. Affect and mood seem to be appropriate     I/O last 3 completed shifts: In: 540 [P.O.:240]  Out: 3300   No intake/output data recorded.       LABS:  Recent Labs     03/14/23  0538 03/15/23  0613    136   K 3.9 3.9   CL 97* 99   CO2 28 26   BUN 29* 41*   CREATININE 1.7* 2.3*   GLUCOSE 118* 72*   CALCIUM 8.2* 8.4*       Recent Labs 03/14/23  0538 03/14/23  2308 03/15/23  0613   WBC 5.1 8.8 6.2   RBC 3.05* 3.45* 3.22*   HGB 7.8* 8.4* 7.9*   HCT 28.9* 31.9* 29.6*   MCV 94.8 92.5 91.9   MCH 25.6* 24.3* 24.5*   MCHC 27.0* 26.3* 26.7*   RDW 18.9* 18.3* 18.1*   PLT 82* 92* 93*   MPV 11.8 12.0 12.6*       No results for input(s): POCGLU in the last 72 hours. Imaging:  CT CHEST WO CONTRAST    Result Date: 3/11/2023  EXAMINATION: CT OF THE CHEST WITHOUT CONTRAST 3/10/2023 4:39 pm TECHNIQUE: CT of the chest was performed without the administration of intravenous contrast. Multiplanar reformatted images are provided for review. Automated exposure control, iterative reconstruction, and/or weight based adjustment of the mA/kV was utilized to reduce the radiation dose to as low as reasonably achievable. COMPARISON: Radiographs of the same day. HISTORY: ORDERING SYSTEM PROVIDED HISTORY: Pleural effusion TECHNOLOGIST PROVIDED HISTORY: Reason for exam:->Pleural effusion FINDINGS: Mediastinum: Tunnel right jugular hemodialysis catheter tip well positioned proximal right atrium. Endotracheal tube tip with good position 2.6 cm above the juan francisco. Gastric tube with good position proximal side hole beneath the hemidiaphragms. Normal caliber and contour of the thoracic aorta. No mediastinal adenopathy detected. Moderate cardiomegaly. Small pericardial effusion. Lungs/pleura: Moderate right and small left pleural effusions with associated compressive atelectasis. Mild re-expansion edema through the right lung, status post thoracentesis noted. Elevated right hemidiaphragm with moderate perihepatic ascites observed in the upper abdomen. Upper Abdomen: Moderate mainly perihepatic ascites. Gastric tube with good positioning. Soft Tissues/Bones: No acute osseous or body wall soft tissue abnormalities. 1. Moderate right and small left pleural effusions with associated compressive atelectasis.  2. Elevation of the right hemidiaphragm with underlying moderate perihepatic ascites. 3. Moderate cardiomegaly and small pericardial effusion. 4. Endotracheal tube, gastric tube, and right jugular tunneled implanted hemodialysis catheter with good positioning. RECOMMENDATIONS: Careful clinical correlation and follow up recommended. XR CHEST PORTABLE    Result Date: 3/10/2023  EXAMINATION: ONE XRAY VIEW OF THE CHEST; ONE SUPINE XRAY VIEW(S) OF THE ABDOMEN 3/10/2023 5:49 am COMPARISON: 4 hours prior. HISTORY: ORDERING SYSTEM PROVIDED HISTORY: ett placement TECHNOLOGIST PROVIDED HISTORY: Reason for exam:->ett placement; ORDERING SYSTEM PROVIDED HISTORY: Confirmation of course of NG/OG/NE tube and location of tip of tube TECHNOLOGIST PROVIDED HISTORY: Reason for exam:->Confirmation of course of NG/OG/NE tube and location of tip of tube Portable? ->Yes FINDINGS: Endotracheal tube tip with good positioning 3.7 cm above the juan francisco. Gastric tube with good position, proximal side hole beneath the hemidiaphragms. Right jugular hemodialysis catheter remains with good positioning. Obscuration of the right cardiac base and right hemidiaphragm. Improving aeration right base. Small right pleural effusion noted. No pneumothorax. Body wall soft tissues unremarkable. Osseous thorax intact. 1. Endotracheal tube and gastric tube with good positioning. 2. Right jugular hemodialysis catheter remains with good positioning. 3. Improved aeration of the right base with small right pleural effusion now evident. RECOMMENDATION: Careful clinical correlation and follow up recommended. XR CHEST PORTABLE    Result Date: 3/10/2023  EXAMINATION: ONE XRAY VIEW OF THE CHEST 3/9/2023 11:39 pm COMPARISON: Chest one view, 02/23/2023 HISTORY: ORDERING SYSTEM PROVIDED HISTORY: hypotension TECHNOLOGIST PROVIDED HISTORY: Reason for exam:->hypotension FINDINGS: Lines, tubes, and devices: There is stable position of right internal jugular tip localized at the atriocaval junction. Lungs and pleura:  There is mild pulmonary vascular congestion and persistent small right pleural effusion. There is interval worsening atelectasis in the right middle lobe and right upper lobe. Right lower lobe atelectasis and/or pneumonitis persists. No pneumothorax. Cardiomediastinal silhouette: The mediastinum is stable. The heart size is normal. Bones and soft tissues: There are surgical clips in right axilla. Surgical clips are superimposed over right upper quadrant. Mild pulmonary vascular congestion. Interval worsening of right upper lobe and right middle lobe atelectasis. Persistent right lower lobe atelectasis and/or pneumonitis. Small right pleural effusion. XR ABDOMEN FOR NG/OG/NE TUBE PLACEMENT    Result Date: 3/10/2023  EXAMINATION: ONE XRAY VIEW OF THE CHEST; ONE SUPINE XRAY VIEW(S) OF THE ABDOMEN 3/10/2023 5:49 am COMPARISON: 4 hours prior. HISTORY: ORDERING SYSTEM PROVIDED HISTORY: ett placement TECHNOLOGIST PROVIDED HISTORY: Reason for exam:->ett placement; ORDERING SYSTEM PROVIDED HISTORY: Confirmation of course of NG/OG/NE tube and location of tip of tube TECHNOLOGIST PROVIDED HISTORY: Reason for exam:->Confirmation of course of NG/OG/NE tube and location of tip of tube Portable? ->Yes FINDINGS: Endotracheal tube tip with good positioning 3.7 cm above the juan francisco. Gastric tube with good position, proximal side hole beneath the hemidiaphragms. Right jugular hemodialysis catheter remains with good positioning. Obscuration of the right cardiac base and right hemidiaphragm. Improving aeration right base. Small right pleural effusion noted. No pneumothorax. Body wall soft tissues unremarkable. Osseous thorax intact. 1. Endotracheal tube and gastric tube with good positioning. 2. Right jugular hemodialysis catheter remains with good positioning. 3. Improved aeration of the right base with small right pleural effusion now evident. RECOMMENDATION: Careful clinical correlation and follow up recommended.      IR GUIDED THORACENTESIS PLEURAL    Result Date: 3/10/2023  PROCEDURE: ULTRASOUNDGUIDED RIGHT THORACENTESIS 3/10/2023 HISTORY: ORDERING SYSTEM PROVIDED HISTORY: right pleural effusion/right thoracentesis, send labs please TECHNOLOGIST PROVIDED HISTORY: right pleural effusion/right thoracentesis, send labs please Reason for exam:->right pleural effusion/right thoracentesis, send labs please Which side should the procedure be performed?->Right TECHNIQUE: Informed consent was obtained after a detailed explanation of the procedure including risks, benefits, and alternatives. Universal protocol was performed. The right chest was prepped and draped in sterile fashion and local anesthesia was achieved with lidocaine. An 8 French needle sheath was advanced under ultrasound guidance into pleural effusion and thoracentesis was performed. The patient tolerated the procedure well. FINDINGS: A total of 1000 cc was removed. Successful ultrasound guided thoracentesis.          Patient Instructions:   Current Discharge Medication List        START taking these medications    Details   levothyroxine (SYNTHROID) 175 MCG tablet Take 1 tablet by mouth Daily  Qty: 30 tablet, Refills: 3      cefepime (MAXIPIME) infusion Infuse 2,000 mg intravenously three times a week for 9 doses Compound per protocol  Please give with HD  Qty: 18 g, Refills: 0      fluconazole (DIFLUCAN) 100 MG tablet Take 1 tablet by mouth daily for 7 days  Qty: 7 tablet, Refills: 0           CONTINUE these medications which have CHANGED    Details   QUEtiapine (SEROQUEL) 25 MG tablet Take 1 tablet by mouth nightly  Qty: 60 tablet, Refills: 0           CONTINUE these medications which have NOT CHANGED    Details   Menthol-Zinc Oxide (CHAMOSYN EX) Apply 1 application topically as needed Apply to buttocks natan area      insulin glargine (LANTUS) 100 UNIT/ML injection vial Inject 13 Units into the skin 2 times daily      B Complex-C-Folic Acid TABS Take 1 tablet by mouth daily      vitamin C (ASCORBIC ACID) 500 MG tablet Take 500 mg by mouth 2 times daily      metoprolol succinate (TOPROL XL) 25 MG extended release tablet Take 1 tablet by mouth daily Hold if SBP is less than 100 or HR less than 55  Qty: 30 tablet, Refills: 3      BiPAP Machine MISC by Does not apply route Nightly. 12/6 30% PER NURSING HOME PAPERWORK      glucagon, rDNA, 1 MG injection Inject 1 mg into the muscle as needed for Low blood sugar      Glucose (TRUEPLUS) 15 GM/32ML GEL Take 15 g by mouth as needed      acetaminophen (TYLENOL) 325 MG tablet Take 650 mg by mouth every 4 hours as needed for Pain      apixaban (ELIQUIS) 5 MG TABS tablet Take 1 tablet by mouth 2 times daily  Qty: 60 tablet, Refills: 0      midodrine (PROAMATINE) 5 MG tablet Take 1 tablet by mouth 3 times daily (with meals)  Qty: 90 tablet, Refills: 0      sertraline (ZOLOFT) 50 MG tablet Take 50 mg by mouth daily      loperamide (IMODIUM) 2 MG capsule Take 2 mg by mouth 4 times daily as needed for Diarrhea      OXYGEN Inhale 3 L into the lungs continuous      docusate sodium (COLACE, DULCOLAX) 100 MG CAPS Take 100 mg by mouth 2 times daily as needed for Constipation      atorvastatin (LIPITOR) 20 MG tablet Take 20 mg by mouth nightly      ipratropium-albuterol (DUONEB) 0.5-2.5 (3) MG/3ML SOLN nebulizer solution Inhale 1 vial into the lungs 4 times daily      miconazole (MICOTIN) 2 % powder Apply 1 each topically 2 times daily Apply topically under breasts twice daily      pantoprazole (PROTONIX) 40 MG tablet Take 40 mg by mouth every morning      budesonide-formoterol (SYMBICORT) 160-4.5 MCG/ACT AERO Inhale 2 puffs into the lungs 2 times daily      carbidopa-levodopa (SINEMET CR)  MG per extended release tablet Take 2 tablets by mouth in the morning and 2 tablets at noon and 2 tablets in the evening. rOPINIRole (REQUIP) 1 MG tablet Take 1 tablet by mouth in the morning and 1 tablet at noon and 1 tablet before bedtime.   Qty: 90 tablet, Refills: 3      sucralfate (CARAFATE) 1 GM tablet Take 1 tablet by mouth in the morning and 1 tablet at noon and 1 tablet in the evening and 1 tablet before bedtime. Qty: 120 tablet, Refills: 1      montelukast (SINGULAIR) 10 MG tablet Take 10 mg by mouth nightly           STOP taking these medications       LORazepam (ATIVAN) 0.5 MG tablet Comments:   Reason for Stopping:         insulin lispro (HUMALOG) 100 UNIT/ML injection vial Comments:   Reason for Stopping:         hydrOXYzine pamoate (VISTARIL) 25 MG capsule Comments:   Reason for Stopping:         mirtazapine (REMERON) 15 MG tablet Comments:   Reason for Stopping:         metoprolol tartrate (LOPRESSOR) 25 MG tablet Comments:   Reason for Stopping:         amiodarone (CORDARONE) 200 MG tablet Comments:   Reason for Stopping:         budesonide (PULMICORT) 0.5 MG/2ML nebulizer suspension Comments:   Reason for Stopping:         insulin glargine-yfgn (SEMGLEE-YFGN) 100 UNIT/ML injection vial Comments:   Reason for Stopping:         divalproex (DEPAKOTE) 250 MG DR tablet Comments:   Reason for Stopping:         ferrous sulfate (IRON 325) 325 (65 Fe) MG tablet Comments:   Reason for Stopping:                 Note that more than 30 minutes was spent in preparing discharge papers, discussing discharge with patient, medication review, etc.    NOTE: This report was transcribed using voice recognition software. Every effort was made to ensure accuracy; however, inadvertent computerized transcription errors may be present.      Signed:  Electronically signed by Riccardo Soulier, MD on 3/16/2023 at 10:01 AM

## 2023-03-16 NOTE — PROGRESS NOTES
NAME: Pita Demarco  MR:  [id number]  :   1950  Admit Date:  3/9/2023      This is a face to face encounter with [facility] of this note, including Diagnosis,  Interval History, Past Medical/Surgical/Family/Social Histories, ROS, physical exam, and Assessment and Plan were copied and pasted from Previous. Updates have been made where noted and reflect current exam and medical decision making from the DOS of this encounter. CHIEF COMPLAINT     ID following for   Chief Complaint   Patient presents with    Hypotension     HISTORY OF PRESENT ILLNESS     Pita Demarco is a 67 y.o. 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 Pt seen and examined 23  Extubated 3/11  in bed has no c/o   Awake- on nasal canula- 1 liters   No fevers. No reported adverse drug reactions.   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]  Elevated troponin [R77.8]  Acute respiratory failure with hypercapnia (HCC) [J96.02]  Chronic renal failure, stage 4 (severe) (HCC) [N18.4]  Hypotension, unspecified hypotension type [I95.9]  Acute on chronic congestive heart failure, unspecified heart failure type ([address] [I50.9]    ID following for   Pt with respiratory failure   Leukopenia   Right pleural effusion h/o + gram stain from pleural fluid  -1000 cc of clear yellow  color pleural fluid drained from patient  -resp cx E cloacae/C albicans   -oral candidiasis  -S/p linezolid (ZYVOX)  stop due to thrombocytopenia    Kidney failure recently started on hemo urine cx E cloacae   LEFT LE HEALED   MRSA neg      Plan:   Continue Cefepime 2 grams IV every M-W-  through 2023   Med rec  Continue diflucan day #7- change to PO  Monitor labs   Continue wound care    D/c with cefepime qhemo or 3 weeks at [facility] [place]  Rx parapneumonia effusion/empyema  F/u 2 weeks            /62   Pulse (!) 101   Temp 98.9 °F (37.2 °C) (Infrared)   Resp 18   Ht 5' (1.524 m)   Wt 212 lb 11.9 oz (96.5 kg)   SpO2 100%   BMI 41.55 kg/m²   Temp  Av.5 °F (36.9 °C)  Min: 98.1 °F (36.7 °C)  Max: 98.9 °F (37.2 °C)  CONSTITUTIONAL:  no apparent distress,  comfortable- in bed. No distress   ENT:  Normocephalic, atraumatic,     LUNGS:  No increased work of breathing,dec  to auscultation bilaterally, no crackles or wheezing on 1 liter   CARDIOVASCULAR:    regular rate and rhythm, normal S1 and S2,  no murmur noted  ABDOMEN:  normal bowel sounds, soft,  -distended, non-tender pannus   MUSCULOSKELETAL:  swelling  BLE. NEUROLOGIC:  Awake, alert, oriented. Caesar Velásquez      SKIN:  normal skin color, texture, turgor and no rashes   Right chest HD cath      Peripheral Intravenous Line:  Peripheral IV 23 Left Antecubital (Active)   Site Assessment Clean, dry & intact 23 1200   Line Status Infusing;Flushed;Sluggish blood return 23 1200   Line Care Connections checked and tightened 23 1200   Phlebitis Assessment No symptoms 23 1200   Infiltration Assessment 0 23 1200   Alcohol Cap Used No 23 1200   Dressing Status Clean, dry & intact 23 1200   Dressing Type Transparent 23 1200      Drain(s):  External Urinary Catheter (Active)   Output (mL) 50 mL 23 0600        Microbiology:      Lab Results   Component Value Date/Time    BC 24 Hours no growth 03/11/2023 09:00 AM    BC 24 Hours no growth 03/11/2023 08:30 AM    BC 5 Days no growth 03/10/2023 12:01 AM    BLOODCULT2 5 Days no growth 03/10/2023 12:01 AM    BLOODCULT2 5 Days no growth 01/17/2023 05:23 PM    BLOODCULT2 5 Days no growth 10/12/2022 04:59 PM    ORG Enterobacter cloacae complex 03/10/2023 02:43 PM    ORG Candida albicans 03/10/2023 02:43 PM    ORG Enterobacter cloacae complex 03/10/2023 03:25 AM     CULTURE, RESPIRATORY   Date Value Ref Range Status   03/10/2023 Oral Pharyngeal Shavon reduced (A)  Final   03/10/2023 One colony  Final   03/10/2023 Moderate growth  Final        WOUND/ABSCESS   Date Value Ref Range Status   01/17/2023 Light growth  Final   07/05/2022 Heavy growth  Final   07/05/2022 Heavy growth  Final     Smear, Respiratory   Date Value Ref Range Status   03/10/2023   Final    Moderate Polymorphonuclear leukocytes  Few Epithelial cells  Rare yeast  Rare Gram positive cocci           Component Value Date/Time    AFBCX No growth after 6 weeks of incubation. 10/18/2022 1106    AFBCX No growth after 6 weeks of incubation. 07/08/2022 1546    FUNGSM No Fungal elements seen 07/08/2022 1546    LABFUNG No growth after 4 weeks of incubation.  07/08/2022 1546       MRSA Culture Only   Date Value Ref Range Status   03/10/2023 Methicillin resistant Staph aureus not isolated  Final       LABS:  Recent Labs     03/14/23  0538 03/14/23  2308 03/15/23  0613   WBC 5.1 8.8 6.2   RBC 3.05* 3.45* 3.22*   HGB 7.8* 8.4* 7.9*   HCT 28.9* 31.9* 29.6*   MCV 94.8 92.5 91.9   MCH 25.6* 24.3* 24.5*   MCHC 27.0* 26.3* 26.7*   RDW 18.9* 18.3* 18.1*   PLT 82* 92* 93*   MPV 11.8 12.0 12.6*       Recent Labs     03/14/23  0538 03/15/23  0613    136   K 3.9 3.9   CL 97* 99   CO2 28 26   BUN 29* 41*   CREATININE 1.7* 2.3*   GLUCOSE 118* 72*   PROT  --  5.4*   LABALBU  --  3.6   CALCIUM 8.2* 8.4*   BILITOT  --  0.7   ALKPHOS  --  57   AST  --  6   ALT  -- <5       Lab Results   Component Value Date    SEDRATE 6 07/06/2022    SEDRATE 15 10/24/2021    SEDRATE 115 (H) 02/03/2021     Lab Results   Component Value Date    CRP 5.0 (H) 07/06/2022    CRP 0.7 (H) 10/25/2021    CRP 13.5 (H) 02/03/2021     Lab Results   Component Value Date    PROCAL 0.40 (H) 03/10/2023    PROCAL 0.89 (H) 02/16/2023    PROCAL 0.33 (H) 01/18/2023     Recent Labs     03/15/23  0613   AST 6   ALT <5         REVIEW OF SYSTEMS     As stated above in the chief complaint, otherwise negative.   CURRENT MEDICATIONS     Current Facility-Administered Medications:     fluconazole (DIFLUCAN) tablet 100 mg, 100 mg, Oral, Nightly, RASHARD Samayoa, 100 mg at 03/15/23 2219    cefepime (MAXIPIME) 2,000 mg in sodium chloride 0.9 % 50 mL IVPB ([id number]), 2,000 mg, IntraVENous, Once per day on Mon Wed Fri, RASHARD Samayoa, Stopped at 03/16/23 0442    pantoprazole (PROTONIX) 40 mg in sodium chloride (PF) 0.9 % 10 mL injection, 40 mg, IntraVENous, Q12H, RASHARD Pedersen CNP, 40 mg at 03/16/23 1921    levothyroxine (SYNTHROID) tablet 175 mcg, 175 mcg, Oral, Daily, Dash Reyes MD, 175 mcg at 03/16/23 9732    predniSONE (DELTASONE) tablet 5 mg, 5 mg, Oral, Daily, Dash Reyes MD, 5 mg at 03/16/23 0841    0.9 % sodium chloride infusion, , IntraVENous, PRN, RASHARD Pedersen CNP    phenylephrine (JIMENA-SYNEPHRINE) 50 mg in sodium chloride 0.9 % 250 mL infusion,  mcg/min, IntraVENous, Continuous, RASHARD Pedersen CNP, Last Rate: 3 mL/hr at 03/12/23 1508, 10 mcg/min at 03/12/23 1508    epoetin josé luis-epbx (RETACRIT) injection 3,000 Units, 3,000 Units, SubCUTAneous, Once per day on Mon Wed Fri, Mohit Juarez, APRN - CNP, 3,000 Units at 03/15/23 1852    sertraline (ZOLOFT) tablet 50 mg, 50 mg, Oral, Nightly, Dash Reyes MD, 50 mg at 03/15/23 2219    rOPINIRole (REQUIP) tablet 1 mg, 1 mg, Oral, TID, Dash Reyes MD, 1 mg at 03/16/23 0841    carbidopa-levodopa (SINEMET CR)  MG per extended release tablet 2 tablet, 2 tablet, Oral, TID, Katheryn Daniel MD, 2 tablet at 03/16/23 0841    metoprolol tartrate (LOPRESSOR) tablet 25 mg, 25 mg, Oral, BID, Katheryn Daniel MD, 25 mg at 03/16/23 0841    glucose chewable tablet 16 g, 4 tablet, Oral, PRN, Edison Fulton MD    dextrose bolus 10% 125 mL, 125 mL, IntraVENous, PRN **OR** dextrose bolus 10% 250 mL, 250 mL, IntraVENous, PRN, Edison Fulton MD    glucagon (rDNA) injection 1 mg, 1 mg, SubCUTAneous, PRN, Edison Fulton MD    dextrose 10 % infusion, , IntraVENous, Continuous PRN, Edison Fulton MD    [facility] [place] by provider] apixaban (ELIQUIS) tablet 5 mg, 5 mg, Oral, BID, Chris Ken MD, 5 mg at 03/11/23 2046    insulin lispro (HUMALOG) injection vial 0-4 Units, 0-4 Units, SubCUTAneous, Nightly, Edison Fulton MD, 4 Units at 03/11/23 2051    insulin glargine (LANTUS) injection vial 8 Units, 8 Units, SubCUTAneous, BID, Edison Fulton MD, 8 Units at 03/16/23 0841    insulin lispro (HUMALOG) injection vial 0-8 Units, 0-8 Units, SubCUTAneous, TID WC, Edison Fulton MD, 4 Units at 03/13/23 0305    QUEtiapine (SEROQUEL) tablet 25 mg, 25 mg, Oral, Nightly, Lauren Spivey, APRN - CNP, 25 mg at 03/15/23 2219    sodium chloride flush 0.9 % injection 10 mL, 10 mL, IntraVENous, 2 times per day, Wyatt Carbone MD, 10 mL at 03/16/23 0842    sodium chloride flush 0.9 % injection 10 mL, 10 mL, IntraVENous, PRN, Wyatt Carbone MD, 10 mL at 03/11/23 1956    0.9 % sodium chloride infusion, , IntraVENous, PRN, Wyatt Carbone MD, Last Rate: 25 mL/hr at 03/15/23 0307, New Bag at 03/15/23 0307    promethazine (PHENERGAN) tablet 12.5 mg, 12.5 mg, Oral, Q6H PRN, 12.5 mg at 03/14/23 1010 **OR** ondansetron (ZOFRAN) injection 4 mg, 4 mg, IntraVENous, Q6H PRN, Wyatt Carbone MD, 4 mg at 03/12/23 1102    polyethylene glycol (GLYCOLAX) packet 17 g, 17 g, Oral, Daily PRN, Hilario Acosta MD Ricki    acetaminophen (TYLENOL) tablet 650 mg, 650 mg, Oral, Q6H PRN, 650 mg at 03/15/23 2219 **OR** acetaminophen (TYLENOL) suppository 650 mg, 650 mg, Rectal, Q6H PRN, Aakash Buckley MD    DOPamine (INTROPIN) 800 mg in dextrose 5 % 250 mL infusion, 1-20 mcg/kg/min, IntraVENous, Continuous, RASHARD Crowell - CNP, Stopped at 03/11/23 1743    budesonide (PULMICORT) nebulizer suspension 500 mcg, 0.5 mg, Nebulization, BID, RASHARD Crowell - CNP, 500 mcg at 03/16/23 0614    albuterol (PROVENTIL) nebulizer solution 2.5 mg, 2.5 mg, Nebulization, Q4H WA, 2.5 mg at 03/16/23 0936 **AND** ipratropium (ATROVENT) 0.02 % nebulizer solution 0.5 mg, 0.5 mg, Nebulization, Q4H WA, RASHARD Crowell - CNP, 0.5 mg at 03/16/23 7447  DIAGNOSTIC RESULTS   Radiology:  CT CHEST WO CONTRAST    Result Date: 3/11/2023  EXAMINATION: CT OF THE CHEST WITHOUT CONTRAST 3/10/2023 4:39 pm     1. Moderate right and small left pleural effusions with associated compressive atelectasis. 2. Elevation of the right hemidiaphragm with underlying moderate perihepatic ascites. 3. Moderate cardiomegaly and small pericardial effusion. 4. Endotracheal tube, gastric tube, and right jugular tunneled implanted hemodialysis catheter with good positioning. RECOMMENDATIONS: Careful clinical correlation and follow up recommended. IR GUIDED PERC PLEURAL Deirdre Legato CATH INSERT    Result Date: 2/14/2023       1. Successful placement of a PleurX catheter within the right pleural space 2. 1000 cc of fluid was aspirated. IR REMOVAL OF TUNNELED PLEURAL CATH W CUFF    Result Date: 2/23/2023       Successful removal of the indwelling right PleurX catheter. Imaging and labs were reviewed per medical records. Thank you for involving me in the care of Denverjason Bruno I will continue to follow. Please do not hesitate to call [phone] for any questions or concerns.     Electronically signed by Bill Pineda MD on 3/16/2023 at 11:10 AM

## 2023-03-16 NOTE — PROGRESS NOTES
[facility] MD Raphael Sears MD Sue Hoover, MD Annia Chandler, BRADLEY Devlin, NUNU Napoles, BRADLEY  Progress Note    3/15/2023    SUBJECTIVE:   3/11: Seen in the ICU. Mechanically ventilated and sedated. FiO2 35 % PEEP 5. She opens eyes to voice, does not follow commands. Tolerated HD yesterday without complications. TSH improving. On dopamine infusion. 3/12: Seen in the ICU. Extubated yesterday, now on nasal canula. No acute complaints. Confused. Denies chest pain, dyspnea, or palpitations. BP low this morning with elevated heart rate. Hgb decreased from 10.2-->7.2. receiving 1 unit PRBC. Denies hematochezia. On amrit. 3/13: Seen in the ICU. Sitting up eating breakfast. Blood pressure has improved, off pressors. Mentation improving. 300 cc urine output yesterday. She is on nasal canula. CT abdomen negative for retroperitoneal hematoma. 3/14: Feeling better than yesterday. Seen while performing physical therapy. Energy level much improved, fatigue improving, still generally weak but making effort for improvement. O2 weaned to 1 L per nasal cannula. 3/15: Resting comfortably. Enjoying her dinner. Dialysis today without event. 3.3 L UF. BP stable throughout treatment. Blood pressure has been better. Feeling better overall. Remains oliguric. PROBLEM LIST:    Principal Problem:    Myxedema coma ([address]  Resolved Problems:    * No resolved hospital problems. *         DIET:    ADULT ORAL NUTRITION SUPPLEMENT; Breakfast, Lunch; Low Calorie/High Protein Oral Supplement  ADULT DIET; Dysphagia - Soft and Bite Sized; 4 carb choices (60 gm/meal); Low Sodium (2 gm); 1800 ml;  No Drinking Straws     MEDS (scheduled):    magnesium sulfate  2,000 mg IntraVENous Once    fluconazole  100 mg Oral Nightly    cefepime  2,000 mg IntraVENous Once per day on Mon Wed Fri    pantoprazole (PROTONIX) 40 mg injection  40 mg IntraVENous Q12H    levothyroxine  175 mcg Oral Daily    predniSONE  5 mg Oral Daily    epoetin josé luis-epbx  3,000 Units SubCUTAneous Once per day on Mon Wed Fri    sertraline  50 mg Oral Nightly    rOPINIRole  1 mg Oral TID    carbidopa-levodopa  2 tablet Oral TID    metoprolol tartrate  25 mg Oral BID    [Held by provider] apixaban  5 mg Oral BID    insulin lispro  0-4 Units SubCUTAneous Nightly    insulin glargine  8 Units SubCUTAneous BID    insulin lispro  0-8 Units SubCUTAneous TID     QUEtiapine  25 mg Oral Nightly    sodium chloride flush  10 mL IntraVENous 2 times per day    budesonide  0.5 mg Nebulization BID    albuterol  2.5 mg Nebulization Q4H WA    And    ipratropium  0.5 mg Nebulization Q4H WA       MEDS (infusions):   sodium chloride      phenylephrine (JIMENA-SYNEPHRINE) 50 mg/250 mL infusion 10 mcg/min (03/12/23 1508)    dextrose      sodium chloride 25 mL/hr at 03/15/23 0307    DOPamine Stopped (03/11/23 1743)       MEDS (prn):  sodium chloride, glucose, dextrose bolus **OR** dextrose bolus, glucagon (rDNA), dextrose, sodium chloride flush, sodium chloride, promethazine **OR** ondansetron, polyethylene glycol, acetaminophen **OR** acetaminophen    PHYSICAL EXAM:     Patient Vitals for the past 24 hrs:   BP Temp Temp src Pulse Resp SpO2 Weight   03/15/23 1739 125/64 98.1 °F (36.7 °C) -- 78 20 -- 212 lb 11.9 oz (96.5 kg)   03/15/23 1700 118/64 -- -- 78 -- -- --   03/15/23 1630 126/65 -- -- 78 -- -- --   03/15/23 1600 (!) 98/48 -- -- 86 -- -- --   03/15/23 1530 (!) 97/56 -- -- 82 -- -- --   03/15/23 1500 115/66 -- -- 87 -- -- --   03/15/23 1430 131/67 -- -- 73 -- -- --   03/15/23 1409 125/61 -- -- 75 -- -- --   03/15/23 1355 (!) 112/59 -- -- 75 18 -- --   03/15/23 0938 -- -- -- 87 17 99 % --   03/15/23 0930 123/76 98.3 °F (36.8 °C) Oral (!) 107 18 100 % --   03/15/23 0601 100/63 98.2 °F (36.8 °C) Oral 82 16 100 % --   03/15/23 0259 84/67 98.1 °F (36.7 °C) Oral 97 17 97 % --   03/15/23 0107 -- -- -- -- 20 -- --   03/14/23 2258 124/71 -- -- (!) 125 18 99 % --   03/14/23 2227 (!) 117/53 99 °F (37.2 °C) Oral (!) 119 16 100 % --   @      Intake/Output Summary (Last 24 hours) at 3/15/2023 2148  Last data filed at 3/15/2023 1739  Gross per 24 hour   Intake 540 ml   Output 3300 ml   Net -2760 ml         Wt Readings from Last 3 Encounters:   03/15/23 212 lb 11.9 oz (96.5 kg)   02/24/23 214 lb 15.2 oz (97.5 kg)   01/16/23 259 lb (117.5 kg)       Constitutional: in no acute distress, confused   HEENT: NC/AT, EOMI, sclera and conjunctiva are clear and anicteric, mucus membranes moist.  Neck: Trachea midline, no JVD. RIJ tesio  Cardiovascular: S1, S2 regular rhythm, no murmur,or rub  Respiratory:  CTAB. No crackles, no wheeze  Gastrointestinal:  Soft, nontender, distended, abdomen and abdominal panus swelling has improved (+1) NABS  Ext: trace dependent edema with skin wrinkling, feet warm  Skin: dry, no rash  Neuro: slight confusion       DATA:    Recent Labs     03/14/23  0538 03/14/23  2308 03/15/23  0613   WBC 5.1 8.8 6.2   HGB 7.8* 8.4* 7.9*   HCT 28.9* 31.9* 29.6*   MCV 94.8 92.5 91.9   PLT 82* 92* 93*     Recent Labs     03/13/23  0408 03/14/23  0538 03/15/23  0613    135 136   K 4.2 3.9 3.9   CL 99 97* 99   CO2 27 28 26   MG 1.8 1.8 1.5*   PHOS 3.7 2.5 2.5   BUN 46* 29* 41*   CREATININE 2.7* 1.7* 2.3*   ALT  --   --  <5   AST  --   --  6   BILIDIR  --   --  0.4*   BILITOT  --   --  0.7   ALKPHOS  --   --  57       Lab Results   Component Value Date    LABPROT 0.5 (H) 01/18/2023    LABPROT 0.5 01/18/2023          Assessment  Acute on chronic kidney disease requiring initiation of hemodialysis during a recent hospitalization. Hemodialysis on Mondays, Wednesdays, and Fridays.   Chronic kidney disease stage III, baseline creatinine 1.4-1.7 mg/dL secondary to diabetic nephropathy and renal microvascular atherosclerotic disease.   Anemia due to CKD  Myxedema Coma resolved with IV levothyroxine, now on oral  Acute hypercapnic respiratory failure     Considerable clinical improvement over the past several days.     Recommendations  Continue IHD support for solute and volume clearance on MWF  Dialysis today  Ultrafiltration as warranted  Iron panel, normal   Continue Retacrit   10 mg midodrine on dialysis days if hypotensive   Fu blood cutlures   Follow labs  Monitor I&O  Continue intensive supportive care        Electronically signed by Emilio Hunter MD on 3/15/2023

## 2023-03-16 NOTE — CARE COORDINATION
3-16-Cm note: [facility] is scheduled to transport [place] [id number] at 12:30. Pt and dtr Lilliana aware of  time, Rn Clau aware also of transport time.  Electronically signed by Orlando Snider RN on 3/16/2023 at 10:24 AM

## 2023-03-16 NOTE — PROGRESS NOTES
OCCUPATIONAL THERAPY BEDSIDE TREATMENT NOTE   [address] [facility]  [facility] [place]. 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 Acute on chronic congestive heart failure, unspecified heart failure type ([address]    3. Chronic renal failure, stage 4 (severe) (HCC)    4. Elevated troponin    5. Hypotension, unspecified hypotension type    6. Acute respiratory failure with hypercapnia (HCC)    7. Acute on chronic respiratory failure with hypercapnia (HCC)    8.  Postprocedural pneumothorax          Surgery: none      Pertinent Medical History:        Past Medical History        Past Medical History:   Diagnosis Date    A-fib ([address]      Acute on chronic congestive heart failure (HCC)      Anxiety      Asthma      CAD (coronary artery disease) 2016    Cancer ([address]  breast ca 2006     right lumpectomy    Chronic kidney disease       nephrolithiasis    COPD exacerbation ([address] 10/12/2022    Depression      Diabetes mellitus ([address]      H/O mammogram      Hemodialysis patient ([address]      Hx MRSA infection       toe infection 2012    Hyperlipidemia      Hypertension      Lateral epicondylitis      Morbid obesity ([address]      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 Marsha Gongora   01/11/2022     Dr. Alessandra Mcdonough   7/29/15    CT GUIDED CHEST TUBE   7/8/2022     CT GUIDED CHEST TUBE 7/8/2022 Tootie Canales MD [facility]    ENDOSCOPY, COLON, DIAGNOSTIC   7/19/15    GALLBLADDER SURGERY        IR PERC CATH PLEURAL DRAIN W/IMAG   2/14/2023     IR PERC CATH PLEURAL DRAIN W/IMAG 2/14/2023 [facility]    IR REMOVAL OF TUNNELED PLEURAL CATH W CUFF   2/23/2023     IR REMOVAL OF TUNNELED PLEURAL CATH W CUFF 2/23/2023 [facility]    LUMBAR LAMINECTOMY        TOE AMPUTATION        TONSILLECTOMY        UPPER GASTROINTESTINAL ENDOSCOPY        UPPER GASTROINTESTINAL ENDOSCOPY N/A 7/19/2022     EGD ESOPHAGOGASTRODUODENOSCOPY performed by Humaira Garibay MD at [facility]           Precautions:  Fall Risk, VRE with contact, genao, 2L    Recommended placement: subaucte    Assessment of current deficits     [x] Functional mobility           [x]ADLs           [x] Strength                  [x]Cognition     [x] Functional transfers         [x] IADLs         [x] Safety Awareness   [x]Endurance     [] Fine Coordination                        [x] Balance      [] Vision/perception   []Sensation       [x]Gross Motor Coordination            [] ROM           [] Delirium                   [] Motor Control      OT PLAN OF CARE   OT POC based on physician orders, patient diagnosis and results of clinical assessment     Frequency/Duration 1-3 days/wk for 2 weeks PRN   Specific OT Treatment Interventions to include:   * Instruction/training on adapted ADL techniques and AE recommendations to increase functional independence within precautions       * Training on energy conservation strategies, correct breathing pattern and techniques to improve independence/tolerance for self-care routine  * Functional transfer/mobility training/DME recommendations for increased independence, safety, and fall prevention  * Patient/Family education to increase follow through with safety techniques and functional independence  * Recommendation of environmental modifications for increased safety with functional transfers/mobility and ADLs  * Cognitive retraining/development of therapeutic activities to improve problem solving, judgement, memory, and attention for increased safety/participation in ADL/IADL tasks  * Therapeutic exercise to improve motor endurance, ROM, and functional strength for ADLs/functional transfers  * Therapeutic activities to facilitate/challenge dynamic balance, stand tolerance for increased safety and independence with ADLs  * Therapeutic activities to facilitate gross/fine motor skills for increased independence with ADLs  * Neuro-muscular re-education: facilitation of righting/equilibrium reactions, midline orientation, scapular stability/mobility, normalization of muscle tone, and facilitation of volitional active controled movement  * Positioning to improve skin integrity, interaction with environment and functional independence      Recommended Adaptive Equipment/DME:  TBD       Home Living: Pt is a long term resident of a SNF and plans to return at [place].    Bathroom setup: roll in shower chair; grab bars by the toilet and 3:1 over top for safety.    DME owned: wc and walker      Prior Level of Function: assist with ADLs , dep with IADLs; ambulated with wc mostly, but transferring with ww and assist   Driving: no   Occupation: none   Enjoys: reading, socializing, phone-games     Pain Level: none  Cognition: A&O: 2/4 confused to situation and time; Follows 2 step directions; learned dependence and pt very self limiting with session, difficult to remain on task, often asking for assistance prior to trying to figure out on her own. Word finding deficits noted (at eval)               Memory:  fair-              Sequencing:  fair-              Problem solving:  poor+              Judgement/safety:   Poor+     AM-PAC Daily Activity - Inpatient   How much help is needed for putting on and taking off regular lower body clothing?: Total  How much help is needed for bathing (which includes washing, rinsing, drying)?: Total  How much help is needed for toileting (which includes using toilet, bedpan, or urinal)?: Total  How much help is needed for putting on and taking off regular upper body clothing?: Total  How much help is needed for taking care of personal grooming?: A Little  How much help for eating meals?: A Little  AM-PAC Inpatient Daily Activity Raw Score: 10     Functional Assessment:      Initial Eval Status  Date: 3/13/2023   Treatment Status  Date: 3/16/23 STGs = LTGs  Time frame: 10-14 days   Feeding Minimal Assist ; Extended time, repositioning for safety/function and education on body mechanics with breakfast from upright sitting in supine. Pt required set up of food items and prep of items on tray. All items placed within reach for increased indep and decreased spillage. Pt was able to use utensils and used right UE as dominate hand with self feeding. Good intake and 25% spillage overall. No coughing for choking hazard was present. N/T  Set up   Grooming Moderate Assist with face/hand wash and hair comb. Pt reporting that her daughter could assist her with it later. Educated her on completing tasks at highest level of indep.    Pt able to brush hair when seated in bed with HOB elevated Minimal Assist    UB Dressing Dependent with gown management from supine level due to line management N/T  Moderate Assist    LB Dressing Dependent with socks from supine N/T Not Appropriate-PLOF      Bathing Maximal Assist x 2 from supine level due to pt confusion and resistance to rolling N/T; pt had assist for bathing prior to session Maximal Assist    Toileting Dependent with toileting with A x 2 from supine and genao management N/T  Dependent    Bed Mobility  Pt with poor+ positioning on arrival. Yelling out, \"I don't sit up! \" When just attempting to make straight in bed. Max A x 2 for positioning and rolling. Max A for positioning to bring UB to midline d/t R lateral lean in bed; wedge placed under R upper body to keep pt at midline Supine to sit: Maximal Assist   Sit to supine: Maximal Assist    Functional Transfers  NT due to pt overall debility, decreased activity tolerance, balance deficits, safety and fall risk. NT due to pt overall debility, decreased activity tolerance, balance deficits, safety and fall risk. Not Appropriate-PLOF      Functional Mobility  NT due to pt overall debility, decreased activity tolerance, balance deficits, safety and fall risk. NT due to pt overall debility, decreased activity tolerance, balance deficits, safety and fall risk. Not appropriate at time of eval. To re-assess at a later time if appropriate. Balance Sitting:     Static: Refused    Dynamic:refused  Standing: refused Sitting:     Static: fair minus supported in bed d/t R lateral lean with max A to bring UB to midline    Dynamic:N/T  Standing: N/T Sitting:     Static:  fair    Dynamic:fair-   Activity Tolerance Vitals with activity: 2L 180/91  97%; 161/87 133 HR 96%; Fair- tolerance and poor+ motivation for therapy session; Declining to sit EOB Fair; slight shakiness in UE's, which pt states is her \"Parkinson's kicking in\"  Increase sitting tolerance for >10min with stable vital signs for carry over into toileting, functional tranfers and indep in ADLs   Visual/  Perceptual Glasses: Present; WFL     Reports change in vision since admission: No      NA      Hand Dominance  [x] Right   [] Left     AROM (PROM) Strength Additional Info:  Goal:   RUE  WFL 4-/5 good  and fair FMC/dexterity noted during ADL tasks-tremor  Opposition [x] Intact [] Impaired  Finger to nose [x] Intact [] Impaired 4+/5MMT generally for carry over into self care, functional transfers and functional mobility with AD.     [facility] [place] 4-/5 good  and fair FMC/dexterity noted during ADL tasks-tremor  Opposition [x] Intact [] Impaired  Finger to nose [x] Intact [] Impaired 5/5MMT generally for carry over into self care, functional transfers and functional mobility with AD.     - BUE AROM exercises: 15 reps in all planes of movement to increase ROM/endurance/strength required for functional transfers/ADL participation. Exercises completed in shoulder and elbow flexion/extension, internal/external rotation, abduction/adduction, supination/pronation, digit and wrist flexion/extension and digit opposition.   B UE ROM appears to be WFL with min rest breaks provided 2* to decreased endurance/tolerance.  Ex's completed when pt seated in bed with HOB elevated.     Hearing: WFL   Sensation:  No c/o numbness or tingling   Tone: WFL   Edema: funmi LE     Comments: Patient cleared by nursing staff.  Upon arrival pt seated in bed with R lateral lean noted. Pt states she is doing \"really wonderful\" today.   Pt agreeable to OT tx session.  Pt educated with regards to positioning,  grooming tasks, B UE ROM ex's, ECT's.  At end of session pt positioned at midline position with wedge under R side and call light within reach. Overall, pt demonstrated decreased independence and safety during completion of ADL/functional transfers/mobility tasks. Pt would benefit from continued skilled OT to increase safety and independence with completion of ADL/IADL tasks for functional independence and quality of life.      Pt required cues and education as noted above for safe facilitation and completion of tasks. Therapist provided skilled monitoring of patient's response during treatment session. Prior to and at the end of session, environmental modifications / line management completed for patients safety and efficiency of treatment session.     Overall, patient demonstrates moderate difficulties with completion of BADLs and IADLs. Factors contributing to these difficulties  include  decreased endurance, and generalized weakness. As noted above, patient likely to benefit from further OT intervention to increase independence, safety, and overall quality of life.     Treatment:     ADL completion: Self-care retraining for the above-mentioned ADLs; training on proper hand placement, safety technique, sequencing, and energy conservation techniques.  Skilled positioning: Proper positioning to improve interaction with environment, overall functioning and decrease/prevent edema and contractures.  Therapeutic Ex's: To increase B UE strength, ROM and endurance required for functional transfers and ADL participation     Pt has made fair progress towards set goals     OT 1-3 days/wk for 2 weeks PRN     Treatment Time also includes thorough review of current medical information, gathering information on past medical history/social history and prior level of function, informal observation of tasks, assessment of data and education on plan of care and goals.    Treatment Time In: 12:14 PM     Treatment Time Out: 12:26 PM            Treatment Charges: Mins Units   ADL/Home Mgt     18847 1 0   Thera Activities     00311 1 0   Ther Ex                 58735 10 1   Manual Therapy    32185     Neuro Re-ed         21716     Orthotic manage/training                               41649     Non Billable Time     Total Timed Treatment 12 1        YIMI Logan/DIPAK #[id number]

## 2023-03-16 NOTE — PROGRESS NOTES
Nurse to nurse given to [address] at [address] [place] [address]. All questions were answered at this time and phone number and name were given should nurse have further question.  Electronically signed by Zaida Murillo RN on 3/16/2023 at 1:15 PM

## 2023-03-16 NOTE — DISCHARGE INSTR - COC
Continuity of Care Form    Patient Name: Beatriz Carpenter   :    MRN:  [id number]    Admit date:  3/9/2023  Discharge date:  3/16/23    Code Status Order: Full Code   Advance Directives:     Admitting Physician:  Bonnie Vu MD  PCP: Ga Dodd MD    Discharging Nurse: [facility] Unit/Room#: 0590/8350-81  Discharging Unit Phone Number: [phone]    Emergency Contact:   Extended Emergency Contact Information  Primary Emergency Contact: [address] [facility] Phone: [phone]  Mobile Phone: [phone]  Relation: Child   needed? No  Secondary Emergency Contact: Tom Thomas   [facility] [address] Phone: [phone]  Mobile Phone: [phone]  Relation: Child   needed?  No    Past Surgical History:  Past Surgical History:   Procedure Laterality Date    BREAST LUMPECTOMY      BREAST REDUCTION SURGERY      CARDIAC CATHETERIZATION  2014    Dr. Aury Kong  2022    Dr. Nolan Oliver  7/29/15    CT GUIDED CHEST TUBE  2022    CT GUIDED CHEST TUBE 2022 Stacy Fernández MD [facility]    ENDOSCOPY, COLON, DIAGNOSTIC  7/19/15    GALLBLADDER SURGERY      IR PERC CATH PLEURAL DRAIN W/IMAG  2023    IR PERC CATH PLEURAL DRAIN W/IMAG 2023 [facility]    IR REMOVAL OF TUNNELED PLEURAL CATH W CUFF  2023    IR REMOVAL OF TUNNELED PLEURAL CATH W CUFF 2023 [facility]    LUMBAR LAMINECTOMY      TOE AMPUTATION      TONSILLECTOMY      UPPER GASTROINTESTINAL ENDOSCOPY      UPPER GASTROINTESTINAL ENDOSCOPY N/A 2022    EGD ESOPHAGOGASTRODUODENOSCOPY performed by Lauren Velasquez MD at [address] [place] [address]       Immunization History:   Immunization History   Administered Date(s) Administered    COVID-19, PFIZER GRAY top, DO NOT Dilute, (age 15 y+), IM, 30 mcg/0.3 mL 2022    Influenza Vaccine, unspecified formulation 10/15/2014    Influenza Virus Vaccine 10/31/2008, 10/05/2011    Influenza, High Dose (Fluzone 65 yrs and older) 09/22/2015, 11/21/2016, 09/19/2017, 09/25/2019    Influenza, Triv, inactivated, subunit, adjuvanted, IM (Fluad 65 yrs and older) 01/03/2019    Pneumococcal Conjugate 13-valent ([id number]) 05/27/2016    Pneumococcal Polysaccharide ([id number]) 12/21/2012, 01/29/2018    Td, unspecified formulation 03/11/2014    Tdap (Boostrix, Adacel) 09/30/2015    Zoster Live (Zostavax) 06/25/2013    Zoster Recombinant (Shingrix) 12/10/2019       Active Problems:  Patient Active Problem List   Diagnosis Code    Depression with anxiety F41.8    Osteoporosis M81.0    Asthma J45.909    Hyperlipidemia E78.5    Mitral regurgitation I34.0    Obstructive sleep apnea syndrome G47.33    Psoriasis L40.9    Diabetes mellitus ([facility] E11.9    Parkinson's disease ([facility] [address] [facility] G20    Primary hypertension I10    Microalbuminuria R80.9    Morbid obesity ([facility] [address] E66.01    RLS (restless legs syndrome) G25.81    Generalized weakness R53.1    Inability to walk R26.2    Hypothyroidism E03.9    Chest pain R07.9    Acute asthma exacerbation J45.901    Asthma exacerbation, mild J45.901    Paroxysmal atrial fibrillation (HCC) I48.0    Atrial fibrillation with rapid ventricular response (HCC) I48.91    Acute on chronic congestive heart failure (HCC) I50.9    Coronary artery disease involving native coronary artery of native heart without angina pectoris I25.10    Dysphagia R13.10    Hepatosplenomegaly R16.2    Acute decompensated heart failure (HCC) D81.0    Acute diastolic (congestive) heart failure (HCC) I50.31    Nonrheumatic tricuspid valve regurgitation I36.1    Tobacco abuse Z72.0    Recurrent syncope R55    Septic shock (HCC) A41.9, R65.21    Seizure-like activity (HCC) R56.9    Acute kidney injury ([facility] [facility] N17.9    Pancytopenia (HCC) D61.818    Thrombocytopenia (HCC) D69.6    Encephalopathy G93.40    Acute respiratory failure with hypoxia (HCC) J96.01    Lactic acidosis E87.20    Delirium R41.0    Acute on chronic anemia D64.9    Pleural effusion, right J90    Acute respiratory failure with hypoxia and hypercapnia ([facility]) J96.01, J96.02    Acute confusion R41.0    Acute on chronic diastolic heart failure ([facility]) I50.33    Macrocytosis D75.89    Other specified anemias D64.89    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, 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19    Hypercapnic respiratory failure ([facility]) J96.92    Acute on chronic respiratory failure ([facility]) J96.20    Hyperkalemia E87.5    Heart failure ([facility]) I50.9    Acute renal insufficiency N28.9    Hypotension I95.9    Anemia D64.9    Acute on chronic respiratory failure with hypoxia and hypercapnia ([facility]) J96.21, J96.22    Palliative care encounter Z51.5    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       Isolation/Infection:   Isolation            Contact          Patient Infection Status       Infection Onset Added Last Indicated Last Indicated By Review Planned Expiration Resolved Resolved By    VRE  01/20/23 01/20/23 Santiago Rush RN        Enterococcus faecium urine 1-18-23    Resolved    COVID-19 (Rule Out) 03/09/23 03/09/23 03/10/23 COVID-19, Rapid (Ordered)   03/10/23 Rule-Out Test Resulted    C-diff Rule Out 02/10/23 02/10/23 02/10/23 CLOSTRIDIUM DIFFICILE EIA (Ordered)   02/10/23 Rule-Out Test Canceled    COVID-19 (Rule Out) 01/17/23 01/17/23 01/17/23 COVID-19, Rapid (Ordered)   01/17/23 Rule-Out Test Resulted    COVID-19 (Rule Out) 12/30/22 12/30/22 12/30/22 COVID-19, Rapid (Ordered)   12/30/22 Rule-Out Test Resulted    COVID-19 (Rule Out) 11/06/22 11/06/22 11/06/22 COVID-19, Rapid (Ordered)   11/06/22 Rule-Out Test Resulted    COVID-19 (Rule Out) 10/12/22 10/12/22 10/12/22 COVID-19, Rapid (Ordered)   10/12/22 Rule-Out Test Resulted    COVID-19 (Rule Out) 10/05/22 10/05/22 10/05/22 Respiratory Panel, Molecular, with COVID-19 (Restricted: peds pts or suitable admitted adults) (Ordered)   10/05/22 Rule-Out Test Resulted    COVID-19 (Rule Out) 10/05/22 10/05/22 10/05/22 COVID-19, Rapid (Ordered)   10/05/22 Rule-Out Test Resulted    COVID-19 (Rule Out) 08/24/22 08/24/22 08/25/22 Respiratory Panel, Molecular, with COVID-19 (Restricted: peds pts or suitable admitted adults) (Ordered)   08/25/22 Rule-Out Test Resulted    COVID-19 (Rule Out) 07/16/22 07/16/22 07/16/22 Respiratory Panel, Molecular, with COVID-19 (Restricted: peds pts or suitable admitted adults) (Ordered)   07/16/22 Rule-Out Test Resulted    COVID-19 (Rule Out) 07/05/22 07/05/22 07/06/22 Respiratory Panel, Molecular, with COVID-19 (Restricted: peds pts or suitable admitted adults) (Ordered)   07/06/22 Rule-Out Test Resulted    COVID-19 (Rule Out) 05/11/22 05/11/22 05/11/22 COVID-19 & Influenza Combo (Ordered)   05/11/22 Rule-Out Test Resulted    COVID-19 (Rule Out) 03/17/22 03/17/22 03/17/22 Respiratory Panel, Molecular, with COVID-19 (Restricted: peds pts or suitable admitted adults) (Ordered)   03/17/22 Rule-Out Test Resulted    COVID-19 (Rule Out) 03/16/22 03/16/22 03/16/22 COVID-19, Rapid (Ordered)   03/16/22 Rule-Out Test Resulted    C-diff Rule Out 01/10/22 01/10/22 01/10/22 C. difficile toxin Molecular (Ordered)   03/17/22 Shay Whiteside RN 1/11/22 1300     COVID-19 (Rule Out) 01/09/22 01/09/22 01/09/22 COVID-19, Rapid (Ordered)   01/09/22 Rule-Out Test Resulted    MRSA 05/19/21 05/21/21 05/19/21 Culture, Wound   10/25/21 Austin Ambriz RN    COVID-19 (Rule Out) 02/04/21 02/04/21 02/04/21 COVID-19 (Ordered)   02/04/21 Rule-Out Test Resulted            Nurse Assessment:  Last Vital Signs: BP 116/62   Pulse (!) 101   Temp 98.9 °F (37.2 °C) (Infrared)   Resp 18   Ht 5' (1.524 m)   Wt 212 lb 11.9 oz (96.5 kg)   SpO2 100%   BMI 41.55 kg/m²     Last documented pain score (0-10 scale): Pain Level: 0  Last Weight:   Wt Readings from Last 1 Encounters:   03/15/23 212 lb 11.9 oz (96.5 kg)     Mental Status:  disoriented and alert    IV Access:  - Dialysis Catheter  - site  [address] IV Access LDV:[id number]}, insertion date: ***    Nursing Mobility/ADLs:  Walking   Assisted  Transfer  Assisted  Bathing  Assisted  Dressing  Assisted  Toileting  Assisted  Feeding  Assisted  Med Admin  Assisted  Med Delivery   prefers mixed with applesauce    Wound Care Documentation and Therapy:  Wound 03/10/23 Groin Right; Inner; Upper (Active)   Wound Image   03/15/23 1151   Wound Etiology Deep tissue/Injury 03/15/23 1151   Wound Cleansed Soap and water 03/15/23 0938   Dressing/Treatment Open to air 03/15/23 0938   Wound Length (cm) 0.8 cm 03/15/23 1151   Wound Width (cm) 15 cm 03/15/23 1151   Wound Surface Area (cm^2) 12 cm^2 03/15/23 1151   Change in Wound Size % (l*w) 0 03/15/23 1151   Wound Assessment Purple/maroon 03/15/23 1151   Drainage Amount None 03/15/23 1151   Odor None 03/15/23 0938   Number of days: 6        Elimination:  Continence: Bowel: Yes  Bladder: No  Urinary Catheter: None   Colostomy/Ileostomy/Ileal Conduit: No       Date of Last BM: ***    Intake/Output Summary (Last 24 hours) at 3/16/2023 1245  Last data filed at 3/16/2023 1057  Gross per 24 hour   Intake 701 ml   Output 3300 ml   Net -2599 ml     I/O last 3 completed shifts: In: 5 [P.O.:240]  Out: 3300     Safety Concerns: At Risk for Falls    Impairments/Disabilities:      Hearing    Nutrition Therapy:  Current Nutrition Therapy:   - Oral Diet:  Dysphagia - Soft and Bite Sized    Routes of Feeding: Oral  Liquids:  Thin Liquids  Daily Fluid Restriction:1800 ml/day  Last Modified Barium Swallow with Video (Video Swallowing Test): not done    Treatments at the Time of Hospital Discharge:   Respiratory Treatments: none  Oxygen Therapy:  is on oxygen at 2 L/min per nasal cannula. Ventilator:    - BiPAP   IPAP: 12 cmH20, CPAP/EPAP: 6 cmH2O only when sleeping    Rehab Therapies: Physical Therapy and Occupational Therapy  Weight Bearing Status/Restrictions: No weight bearing restrictions  Other Medical Equipment (for information only, NOT a DME order):  walker  Other Treatments: ***    Patient's personal belongings (please select all that are sent with patient):  None    RN SIGNATURE:  Electronically signed by Aneudy Salgado RN on 3/16/23 at 12:49 PM EDT    CASE MANAGEMENT/SOCIAL WORK SECTION    Inpatient Status Date: ***    Readmission Risk Assessment Score:  Readmission Risk              Risk of Unplanned Readmission:  82           Discharging to Facility/ Agency   Name:   Address:  Phone:  Fax:    Dialysis Facility (if applicable)   Name:  Address:  Dialysis Schedule:  Phone:  Fax:    / signature: {Esignature:[id number]}    PHYSICIAN SECTION    Prognosis: {Prognosis:5190288722}    Condition at Discharge: [facility] Patient Condition:801355538}    Rehab Potential (if transferring to Rehab): {Prognosis:5783931767}    Recommended Labs or Other Treatments After Discharge: ***    Physician Certification: I certify the above information and transfer of Tamiko Hendrix  is necessary for the continuing treatment of the diagnosis listed and that she requires {Admit to Appropriate Level of Care:30418} for {GREATER/LESS:297883934} 30 days.      Update Admission H&P: {CHP DME Changes in DKGDC:559546499}    PHYSICIAN SIGNATURE:  {Esignature:[id number]}

## 2023-04-01 ENCOUNTER — TELEPHONE (OUTPATIENT)
Dept: OTHER | Facility: CLINIC | Age: 73
End: 2023-04-01

## 2023-04-02 PROBLEM — G25.81 RLS (RESTLESS LEGS SYNDROME): Status: RESOLVED | Noted: 2021-08-11 | Resolved: 2023-04-02

## 2023-04-02 PROBLEM — I50.33 ACUTE ON CHRONIC DIASTOLIC HEART FAILURE (HCC): Status: RESOLVED | Noted: 2022-01-01 | Resolved: 2023-01-01

## 2023-04-02 PROBLEM — J96.22 ACUTE ON CHRONIC RESPIRATORY FAILURE WITH HYPOXIA AND HYPERCAPNIA (HCC): Status: RESOLVED | Noted: 2023-01-01 | Resolved: 2023-01-01

## 2023-04-02 PROBLEM — D64.9 ANEMIA: Status: RESOLVED | Noted: 2022-01-01 | Resolved: 2023-01-01

## 2023-04-02 PROBLEM — I48.91 ATRIAL FIBRILLATION WITH RAPID VENTRICULAR RESPONSE (HCC): Status: RESOLVED | Noted: 2022-04-14 | Resolved: 2023-01-01

## 2023-04-02 PROBLEM — R07.9 CHEST PAIN: Status: RESOLVED | Noted: 2022-01-09 | Resolved: 2023-01-01

## 2023-04-02 PROBLEM — E03.5 MYXEDEMA COMA (HCC): Status: RESOLVED | Noted: 2023-01-01 | Resolved: 2023-01-01

## 2023-04-02 PROBLEM — I95.9 HYPOTENSION: Status: RESOLVED | Noted: 2022-01-01 | Resolved: 2023-01-01

## 2023-04-02 PROBLEM — J96.01 PUERPERAL SEPSIS WITH ACUTE HYPOXIC RESPIRATORY FAILURE WITHOUT SEPTIC SHOCK (HCC): Status: RESOLVED | Noted: 2022-08-26 | Resolved: 2023-04-02

## 2023-04-02 PROBLEM — E87.5 HYPERKALEMIA: Status: RESOLVED | Noted: 2022-01-01 | Resolved: 2023-01-01

## 2023-04-02 PROBLEM — N28.9 ACUTE RENAL INSUFFICIENCY: Status: RESOLVED | Noted: 2022-01-01 | Resolved: 2023-01-01

## 2023-04-02 PROBLEM — J44.1 COPD EXACERBATION (HCC): Status: RESOLVED | Noted: 2022-01-01 | Resolved: 2023-01-01

## 2023-04-02 PROBLEM — R80.9 MICROALBUMINURIA: Status: RESOLVED | Noted: 2019-12-10 | Resolved: 2023-01-01

## 2023-04-02 PROBLEM — J90 PLEURAL EFFUSION, RIGHT: Status: RESOLVED | Noted: 2022-01-01 | Resolved: 2023-01-01

## 2023-04-02 PROBLEM — J96.01 ACUTE RESPIRATORY FAILURE WITH HYPOXIA AND HYPERCAPNIA (HCC): Status: RESOLVED | Noted: 2022-01-01 | Resolved: 2023-01-01

## 2023-04-02 PROBLEM — I50.810 RVF (RIGHT VENTRICULAR FAILURE) (HCC): Status: RESOLVED | Noted: 2022-01-01 | Resolved: 2023-01-01

## 2023-04-02 PROBLEM — D75.89 MACROCYTOSIS: Status: RESOLVED | Noted: 2022-01-01 | Resolved: 2023-01-01

## 2023-04-02 PROBLEM — I50.9 HEART FAILURE (HCC): Status: RESOLVED | Noted: 2022-01-01 | Resolved: 2023-01-01

## 2023-04-02 PROBLEM — A41.9 SEPTIC SHOCK (HCC): Status: RESOLVED | Noted: 2022-01-01 | Resolved: 2023-01-01

## 2023-04-02 PROBLEM — J96.22 ACUTE ON CHRONIC RESPIRATORY FAILURE WITH HYPERCAPNIA (HCC): Status: RESOLVED | Noted: 2022-01-01 | Resolved: 2023-01-01

## 2023-04-02 PROBLEM — R55 RECURRENT SYNCOPE: Status: RESOLVED | Noted: 2022-01-01 | Resolved: 2023-01-01

## 2023-04-02 PROBLEM — R41.0 DELIRIUM: Status: RESOLVED | Noted: 2022-01-01 | Resolved: 2023-01-01

## 2023-04-02 PROBLEM — J96.21 ACUTE ON CHRONIC RESPIRATORY FAILURE WITH HYPOXIA AND HYPERCAPNIA (HCC): Status: RESOLVED | Noted: 2023-01-01 | Resolved: 2023-01-01

## 2023-04-02 PROBLEM — Z51.5 PALLIATIVE CARE ENCOUNTER: Status: RESOLVED | Noted: 2023-01-01 | Resolved: 2023-01-01

## 2023-04-02 PROBLEM — E87.20 LACTIC ACIDOSIS: Status: RESOLVED | Noted: 2022-01-01 | Resolved: 2023-01-01

## 2023-04-02 PROBLEM — J96.92 HYPERCAPNIC RESPIRATORY FAILURE (HCC): Status: RESOLVED | Noted: 2022-01-01 | Resolved: 2023-01-01

## 2023-04-02 PROBLEM — A41.9 SEPSIS (HCC): Status: RESOLVED | Noted: 2022-01-01 | Resolved: 2023-01-01

## 2023-04-02 PROBLEM — R56.9 SEIZURE-LIKE ACTIVITY (HCC): Status: RESOLVED | Noted: 2022-07-06 | Resolved: 2023-04-02

## 2023-04-02 PROBLEM — D64.89 OTHER SPECIFIED ANEMIAS: Status: RESOLVED | Noted: 2022-01-01 | Resolved: 2023-01-01

## 2023-04-02 PROBLEM — E87.3 METABOLIC ALKALOSIS: Status: RESOLVED | Noted: 2022-08-31 | Resolved: 2023-04-02

## 2023-04-02 PROBLEM — R65.20 PUERPERAL SEPSIS WITH ACUTE HYPOXIC RESPIRATORY FAILURE WITHOUT SEPTIC SHOCK (HCC): Status: RESOLVED | Noted: 2022-01-01 | Resolved: 2023-01-01

## 2023-04-02 PROBLEM — G93.40 ENCEPHALOPATHY: Status: RESOLVED | Noted: 2022-01-01 | Resolved: 2023-01-01

## 2023-04-02 PROBLEM — R65.21 SEPTIC SHOCK (HCC): Status: RESOLVED | Noted: 2022-01-01 | Resolved: 2023-01-01

## 2023-04-02 PROBLEM — Z79.01 CHRONIC ANTICOAGULATION: Status: RESOLVED | Noted: 2022-01-01 | Resolved: 2023-01-01

## 2023-04-02 PROBLEM — R57.9 SHOCK (HCC): Status: ACTIVE | Noted: 2023-01-01

## 2023-04-02 PROBLEM — R53.1 GENERALIZED WEAKNESS: Status: RESOLVED | Noted: 2021-10-23 | Resolved: 2023-01-01

## 2023-04-02 PROBLEM — J45.901 ASTHMA EXACERBATION, MILD: Status: RESOLVED | Noted: 2022-03-16 | Resolved: 2023-01-01

## 2023-04-02 PROBLEM — R41.0 ACUTE CONFUSION: Status: RESOLVED | Noted: 2022-01-01 | Resolved: 2023-01-01

## 2023-04-02 PROBLEM — D69.6 THROMBOCYTOPENIA (HCC): Status: RESOLVED | Noted: 2022-01-01 | Resolved: 2023-01-01

## 2023-04-02 PROBLEM — I50.9 ACUTE DECOMPENSATED HEART FAILURE (HCC): Status: RESOLVED | Noted: 2022-01-01 | Resolved: 2023-01-01

## 2023-04-02 PROBLEM — J96.20 ACUTE ON CHRONIC RESPIRATORY FAILURE (HCC): Status: RESOLVED | Noted: 2022-01-01 | Resolved: 2023-01-01

## 2023-04-02 PROBLEM — J96.02 ACUTE RESPIRATORY FAILURE WITH HYPOXIA AND HYPERCAPNIA (HCC): Status: RESOLVED | Noted: 2022-08-24 | Resolved: 2023-04-02

## 2023-04-02 PROBLEM — J45.901 ACUTE ASTHMA EXACERBATION: Status: RESOLVED | Noted: 2022-03-16 | Resolved: 2023-01-01

## 2023-04-02 PROBLEM — I48.0 PAROXYSMAL ATRIAL FIBRILLATION (HCC): Status: RESOLVED | Noted: 2022-04-14 | Resolved: 2023-01-01

## 2023-04-02 NOTE — TELEPHONE ENCOUNTER
Writer contacted Dr. Primitivo Paz to inform of 30 day readmission risk. Dr. Darwin Wong informed writer of readmission.

## 2023-04-06 PROBLEM — N18.6 ESRD ON DIALYSIS (HCC): Status: ACTIVE | Noted: 2023-01-01

## 2023-04-06 PROBLEM — Z99.2 ESRD ON DIALYSIS (HCC): Status: ACTIVE | Noted: 2023-01-01

## 2023-04-09 PROBLEM — F41.9 ANXIETY: Status: ACTIVE | Noted: 2023-01-01

## 2023-04-17 PROBLEM — I70.213 INTERMITTENT CLAUDICATION OF BOTH LOWER EXTREMITIES DUE TO ATHEROSCLEROSIS (HCC): Status: ACTIVE | Noted: 2023-01-01

## 2023-04-17 PROBLEM — U07.1 COVID-19: Status: ACTIVE | Noted: 2023-01-01

## 2023-04-17 PROBLEM — R53.1 WEAKNESS: Status: ACTIVE | Noted: 2023-01-01

## 2023-04-17 PROBLEM — M86.9 OSTEOMYELITIS (HCC): Status: ACTIVE | Noted: 2023-01-01

## 2023-04-17 PROBLEM — I48.91 A-FIB (HCC): Status: ACTIVE | Noted: 2023-01-01

## 2023-04-17 PROBLEM — R41.82 ALTERED MENTAL STATUS: Status: ACTIVE | Noted: 2023-01-01

## 2023-04-17 PROBLEM — L97.514 SKIN ULCER OF SECOND TOE OF RIGHT FOOT WITH NECROSIS OF BONE (HCC): Status: ACTIVE | Noted: 2023-01-01

## 2023-04-17 PROBLEM — E11.69 DISORDER OF NERVOUS SYSTEM DUE TO TYPE 2 DIABETES MELLITUS (HCC): Status: ACTIVE | Noted: 2023-01-01

## 2023-04-17 PROBLEM — E11.51 TYPE 2 DIABETES MELLITUS WITH PERIPHERAL ANGIOPATHY (HCC): Status: ACTIVE | Noted: 2023-01-01

## 2023-04-17 PROBLEM — G98.8 DISORDER OF NERVOUS SYSTEM DUE TO TYPE 2 DIABETES MELLITUS (HCC): Status: ACTIVE | Noted: 2023-01-01

## 2023-04-17 PROBLEM — I48.91 ATRIAL FIBRILLATION (HCC): Status: ACTIVE | Noted: 2023-01-01

## 2023-04-17 PROBLEM — I10 HYPERTENSION: Status: ACTIVE | Noted: 2023-01-01

## 2023-04-17 NOTE — H&P
nebulizer solution Inhale 1 vial into the lungs 4 times daily    Historical Provider, MD   miconazole (MICOTIN) 2 % powder Apply 1 each topically 2 times daily Apply topically under breasts twice daily    Historical Provider, MD   pantoprazole (PROTONIX) 40 MG tablet Take 40 mg by mouth every morning    Historical Provider, MD   budesonide-formoterol (SYMBICORT) 160-4.5 MCG/ACT AERO Inhale 2 puffs into the lungs 2 times daily    Historical Provider, MD   metoprolol tartrate (LOPRESSOR) 25 MG tablet Take 0.5 tablets by mouth in the morning and 0.5 tablets before bedtime. 7/27/22 9/8/22  Jacob Vincent MD   carbidopa-levodopa (SINEMET CR)  MG per extended release tablet Take 2 tablets by mouth in the morning and 2 tablets at noon and 2 tablets in the evening. 7/23/22   Jacob Vincent MD   rOPINIRole (REQUIP) 1 MG tablet Take 1 tablet by mouth in the morning and 1 tablet at noon and 1 tablet before bedtime. 7/23/22   Jacob Vincent MD   amiodarone (CORDARONE) 200 MG tablet Take 1 tablet by mouth in the morning. 7/24/22 9/8/22  Jacob Vincent MD   budesonide (PULMICORT) 0.5 MG/2ML nebulizer suspension Take 2 mLs by nebulization in the morning and 2 mLs in the evening. 7/23/22 9/8/22  Jacob Vincent MD   insulin glargine-[facility]) 100 UNIT/ML injection vial Inject 5 Units into the skin nightly 7/23/22 9/8/22  Jacob Vincent MD   sucralfate (CARAFATE) 1 GM tablet Take 1 tablet by mouth in the morning and 1 tablet at noon and 1 tablet in the evening and 1 tablet before bedtime. 7/20/22   Leyla Gaines DO   montelukast (SINGULAIR) 10 MG tablet Take 10 mg by mouth nightly    Historical Provider, MD       Allergies:    Ciprofloxacin, Lmw heparin, Codeine, and Digoxin and related    Social History:   Social History     Socioeconomic History    Marital status:       Spouse name: Not on file    Number of children: Not on file    Years of education: Not on file    Highest

## 2023-04-17 NOTE — FLOWSHEET NOTE
04/17/23 1745   Vital Signs   BP (!) 103/40   Temp 98.2 °F (36.8 °C)   Heart Rate 78   Resp 25   Post-Hemodialysis Assessment   Machine Disinfection Process Acid/Vinegar Clean;Heat Disinfect   Rinseback Volume (ml) 300 ml   Blood Volume Processed (Liters) 68.6 l/min   Dialyzer Clearance Lightly streaked   Duration of Treatment (minutes) 195 minutes   Heparin Amount Administered During Treatment (mL) 0 mL   Hemodialysis Intake (ml) 300 ml   Hemodialysis Output (ml) 2000 ml   NET Removed (ml) 1700   Patient Response to Treatment Tolerated HD well.    Bilateral Breath Sounds Diminished   Edema Generalized   Patient Disposition Return to room

## 2023-04-17 NOTE — ED PROVIDER NOTES
76-year-old female     Chief Complaint   Patient presents with    Fatigue     Pt sent from [place] [address] for lethargy. Pt was discharged from the hospital a couple of days ago with Afib. Pt recently diagnosed with COVID per EMS. She presents to the emergency department with concerns of A-fib and weakness. She was reported to be COVID-positive at the nursing home. She is unable to provide any meaningful history. By pressure significant from nursing home and EMS. Upon entering the patient is tachycardic electrolyte fluid overload. She is given diltiazem which improved heart rate and her pressure became soft. She is given 2 L of fluids with brought slight crackles and slight edema to her exam.  Patient has a significant SHANTANU, leukocytosis and a respiratory distress with hypoxia. She is COVID-positive. Images were reviewed by me chest x-ray imaging was notable for a consolidation right lung base. CT head without contrast was evaluated to show for acute reviewed was negative. Considered pulm embolism however the patient is fully anticoagulated. Consider acute ischemic causes however the patient EKG is unremarkable ACS. Elevated troponin is from demand ischemia. Review of Systems   Unable to obtain  Physical Exam  Vitals reviewed. Constitutional:       General: She is not in acute distress. Appearance: Normal appearance. She is not ill-appearing, toxic-appearing or diaphoretic. HENT:      Head: Normocephalic and atraumatic. Right Ear: External ear normal.      Left Ear: External ear normal.      Nose: Nose normal.      Mouth/Throat:      Mouth: Mucous membranes are moist.   Eyes:      General:         Right eye: No discharge. Left eye: No discharge. Extraocular Movements: Extraocular movements intact. Conjunctiva/sclera: Conjunctivae normal.      Pupils: Pupils are equal, round, and reactive to light. Cardiovascular:      Rate and Rhythm: Tachycardia present. Rhythm irregular.

## 2023-04-18 NOTE — SIGNIFICANT EVENT
IVPB  125 mg IntraVENous Daily    [START ON 4/24/2023] levothyroxine (SYNTHROID) IV syringe  100 mcg IntraVENous Daily    pantoprazole (PROTONIX) 40 mg injection  40 mg IntraVENous Daily    insulin lispro  0-4 Units SubCUTAneous Q6H    heparin (porcine)  5,000 Units SubCUTAneous 3 times per day     acetaminophen, 650 mg, Q4H PRN  docusate sodium, 100 mg, BID PRN  glucagon (rDNA), 1 mg, PRN  glucose, 16 g, PRN  loperamide, 2 mg, 4x Daily PRN  polyethylene glycol, 17 g, Daily PRN  acetaminophen, 650 mg, Q6H PRN  dextrose, 25 g, PRN         Objective:    BP (!) 66/40   Pulse (!) 122   Temp 97.3 °F (36.3 °C) (Oral)   Resp 17   SpO2 96%     Constitutional:  Elderly, obese, minimally responsive to sternal rub  Eyes: no scleral icterus, normal lids, no discharge  ENMT:  Normocephalic, atraumatic, mucosa moist, BIPAP mask in place  Neck:  trachea midline, no JVD  Lungs:  diminished bilaterally, no audible rhonchi or wheezes noted, no retractions  Heart[de-identified]  IRR, tachycardic, no murmur, rub, or gallop noted during exam  Abd:  Soft, obese, non distended, bowel sounds present  :  deferred  MSK: sarcopenia present  Ext:  minimal active movement, pulses present  Skin:  Warm and dry, no rashes on visible skin  Psych: unable to assess  Neuro:  opens eyes to loud voice with sternal rub; not following commands    Recent Labs     04/17/23  0949   *   K 3.4*      CO2 31*   BUN 25*   CREATININE 2.8*   GLUCOSE 83   CALCIUM 9.1       Recent Labs     04/17/23  0949   WBC 10.7   RBC 3.81   HGB 10.0*   HCT 38.3   .5*   MCH 26.2   MCHC 26.1*   RDW 18.7*      MPV 10.9           I/O last 3 completed shifts: In: 300   Out: 2000   No intake/output data recorded.       Assessment:    Principal Problem:    Acute on chronic respiratory failure with hypercapnia (HCC)  Active Problems:    Acute diastolic (congestive) heart failure (HCC)    Acute respiratory failure with hypoxia (HCC)    Pulmonary HTN (HCC)    Persistent

## 2023-04-18 NOTE — CODE DOCUMENTATION
Dr. Delgado Service ordered 500cc bolus, albumin x1, transfer to ICU.   Dr. Delgado Service spoke with intensivist.

## 2023-04-18 NOTE — ACP (ADVANCE CARE PLANNING)
Patient / Ethyl General regarding differences between Advance Directives and portable DNR orders.     Length of ACP Conversation in minutes:      Conversation Outcomes:  ACP discussion completed    Follow-up plan:    [] Schedule follow-up conversation to continue planning  [x] Referred individual to Provider for additional questions/concerns   [] Advised patient/agent/surrogate to review completed ACP document and update if needed with changes in condition, patient preferences or care setting    [x] This note routed to one or more involved healthcare providers         Electronically signed by Rocky Paz RN-BC on 4/18/2023 at 9:17 AM

## 2023-04-18 NOTE — PLAN OF CARE
Orders placed for comfort care. [facility] will sign off at this time. Please do not hesitate to call with any questions or concerns.      Eloy Jim, DO

## 2023-04-18 NOTE — CODE DOCUMENTATION
Pt hypotensive and lethargic on bipap, [facility] called  89/73  93% pap  RR 20    Responsive to painful stimuli.     Staff at [facility]:  Primary RN- Tyrone Corcoran  RT-[facility]

## 2023-04-18 NOTE — CARE COORDINATION
Case Management Assessment  Initial Evaluation    Date/Time of Evaluation: 4/18/2023 9:29 AM  Assessment Completed by: Ramon Francisco RN    If patient is discharged prior to next notation, then this note serves as note for discharge by case management. Patient Name: Renee Kemp                   YOB: 1950  Diagnosis: Respiratory acidosis [E87.29]  Altered mental status [R41.82]  A-fib ([facility] [I48.91]  Persistent atrial fibrillation ([facility] [address] [I48.19]  SHANTANU (acute kidney injury) ([facility] [address] [N17.9]  COVID [U07.1]                   Date / Time: 4/17/2023  9:08 AM    Patient Admission Status: Inpatient   Readmission Risk (Low < 19, Mod (19-27), High > 27): Readmission Risk Score: 39.7    Current PCP: Marianna Grimes MD  PCP verified by CM?  Yes    Chart Reviewed: Yes      History Provided by: Child/Family  Patient Orientation: Unable to Assess    Patient Cognition: Alert    Hospitalization in the last 30 days (Readmission):  Yes    Readmission Assessment  Number of Days since last admission?: 1-7 days  Previous Disposition: SNF  Who is being Interviewed: Caregiver (vera daughter)  What was the patient's/caregiver's perception as to why they think they needed to return back to the hospital?: Other (Comment) (couldn't breathe covid)  Did you visit your Primary Care Physician after you left the hospital, before you returned this time?: Yes  Did you see a specialist, such as Cardiac, Pulmonary, Orthopedic Physician, etc. after you left the hospital?: No  Who advised the patient to return to the hospital?: Caregiver  Does the patient report anything that got in the way of taking their medications?: No  In our efforts to provide the best possible care to you and others like you, can you think of anything that we could have done to help you after you left the hospital the first time, so that you might not have needed to return so soon?: Other (Comment) (plans to return to SNF)      Advance Directives:      Code

## 2023-04-19 NOTE — PLAN OF CARE
Problem: Discharge Planning  Goal: Discharge to home or other facility with appropriate resources  Outcome: Not Progressing     Problem: Skin/Tissue Integrity  Goal: Absence of new skin breakdown  Description: 1. Monitor for areas of redness and/or skin breakdown  2. Assess vascular access sites hourly  3. Every 4-6 hours minimum:  Change oxygen saturation probe site  4. Every 4-6 hours:  If on nasal continuous positive airway pressure, respiratory therapy assess nares and determine need for appliance change or resting period. Outcome: Not Progressing     Problem: ABCDS Injury Assessment  Goal: Absence of physical injury  Outcome: Not Progressing     Problem: Chronic Conditions and Co-morbidities  Goal: Patient's chronic conditions and co-morbidity symptoms are monitored and maintained or improved  Outcome: Not Progressing     Problem: Nutrition Deficit:  Goal: Optimize nutritional status  Outcome: Not Progressing     Problem: Safety - Adult  Goal: Free from fall injury  4/19/2023 1233 by Jinny Wadsworth RN  Outcome: Progressing  4/19/2023 0615 by Siri Larson RN  Outcome: Progressing     Problem: Pain  Goal: Verbalizes/displays adequate comfort level or baseline comfort level  Outcome: Progressing     Problem: Discharge Planning  Goal: Discharge to home or other facility with appropriate resources  Outcome: Not Progressing     Problem: Skin/Tissue Integrity  Goal: Absence of new skin breakdown  Description: 1. Monitor for areas of redness and/or skin breakdown  2. Assess vascular access sites hourly  3. Every 4-6 hours minimum:  Change oxygen saturation probe site  4. Every 4-6 hours:  If on nasal continuous positive airway pressure, respiratory therapy assess nares and determine need for appliance change or resting period.   Outcome: Not Progressing     Problem: ABCDS Injury Assessment  Goal: Absence of physical injury  Outcome: Not Progressing     Problem: Chronic Conditions and

## 2023-04-19 NOTE — CARE COORDINATION
4/19/2023 Possible discharge versus Request for transfer out of ICU (need orders and spoke to Nursing supervisor). Pt is DNR-CC, informed by nursing x 2 that family is not interested in [facility]. Pt can Return to SNF- [facility] (N)- spoke to [address] reached out to [facility] pending return call (left a SMS phone number voice mail was full). Pt is from [facility] long term bed hold, NO PRECERT under her Medicaid. She can return to SNF then care can be provided under her hospice Medicaid coverage. Per Raimundo Oates at [facility] - ANGUS can and took all pts belongings from the SNF. CM following.   Electronically signed by Molina Vivar RN-BC on 4/19/2023 at 1:45 PM

## 2023-04-19 NOTE — CONSULTS
INPATIENT CARDIOLOGY CONSULT     Reason for Consult: AF RVR    Cardiologist: Dr. Anine Steele    Requesting Physician: Dr. Brandee Roque    Date of Consultation: 4/18/2023    HISTORY OF PRESENT ILLNESS:   Patient is a 67year old WF known to Dr. Tung Ferreira. She is being seen in consultation is hospital admission by Dr. Annie Steele for evaluation and recommendations regarding AF RVR. Due to patient's COVID-19+ status and currently with AMS/confusion, interview was attempted via telephone but unsuccessful. As a result, most of history obtained through chart review and discuss with medical staff. Patient with a complex past medical history as detailed below. Patient presented to [facility] on April 17, 2023 due to altered mental status and lethargy. Patient had reportedly been refusing her BiPAP machine at night. Limited ROS, SH and FH at this time due to AMS. She is noted to have COVID-19 positive infection and possible associated pneumonia with sepsis. Intermittent hypotension noted, currently stable. Telemetry reveals AF with RVR, HR in the 120s currently on amiodarone drip at 0.5 mg. She is currently able to tolerate oral medications due to AMS. Please note: past medical records were reviewed per electronic medical record (EMR) - see detailed reports under Past Medical/ Surgical History.    PAST MEDICAL HISTORY:    Persistent atrial fibrillation  History of prior successful DCCV (4/2022, 5/2022)  [address] with Eliquis  Acute on chronic hypoxic/hypercapnic respiratory failure, on 3L NC  Chronic HFpEF  VHD  Pulmonary HTN   Non-obstructive CAD, clinically stable  Chronically elevated troponin   ESRD, on HD  HTN, now with recent issues with hypotension on Midodrine therapy   HLD, on statin therapy  Insulin requiring T2DM  SERENA, noncompliant with treatment  Obesity   Hypothyroidism, on HRT  Tobacco abuse / COPD  Chronic anemia   Depression  Anxiety   Reported Parkinson's disease  History of R breast CA s/p right
Tachycardic. BLOOD WORK:  Creatinine is 1.6. ASSESSMENT:  1.  End-stage renal disease, on hemodialysis thrice weekly. 2.  Heart failure, compensated at this point. She is euvolemic. 3.  Atrial fibrillation with rapid ventricular rate. 4.  Hypotension, multifactorial, likely related to AFib with RVR. 5.  Anemia of chronic kidney disease. 6.  Bed bound. 7.  History of tobacco abuse/COPD. PLAN:  1. Continue hemodialysis support as hemodynamically tolerated. 2.  Continue critical care management and cardiology management for AFib  with rapid ventricular rate. 3.  I am being told family is considering hospice. We will follow along with you.         Shefali Hastings MD    D: 04/18/2023 20:34:18       T: 04/18/2023 23:36:36     [email or web address]  Job#: [id number]     Doc#: [id number]    CC:
as needed bronchodilators  -Chest x-ray showed bilateral lung infiltrates which are worsened with greater consolidation of the right lung base, right pleural effusion  - pulmonary following, input appreciated   -CT head negative for acute intracranial abnormalities  -Trend troponin, proBNP in a.m.  -A-fib RVR, was started on Cardizem drip, became hypotensive, was given amnio bolus and drip, with 500 cc fluid bolus, received 1.5 L of pleural fluid bolus in ED, was noted to be in respiratory failure, went for a stat HD treatment with fluid removal 1.7 L yesterday 4/17/2023. 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 -Trend troponin, consult to cardiology, input appreciated  -Echo on 1/18/2023 showed EF of 65%, mild to moderate pulmonary hypertension)  -Keep MAP greater than 65 mmHg, currently not on pressors, may require pressors if hypotensive.  -May need aggressive diuresis, currently hypotensive.  -Dexamethasone 10 mg every 24x10 dose, check inflammatory markers, CRP, ferritin, procalcitonin, D-dimer, LDH  -Blood culture pending, UA pending, urine culture pending, urine antigen, RVP/COVID-19 PCR was positive for COVID-19, MRSA nares pending  -Empirically started on vancomycin and cefepime  -ID is following, input appreciated  -ESRD on hemodialysis, received HD yesterday with removal of 1.7 L, nephrology following, input appreciated  -Given albumin x1  -Continue Ferrlecit  -Insulin sliding scale, Lantus 13 units twice daily, may need to help if hypotensive, currently patient is n.p.o. but also on steroids. '  -Synthroid was changed to IV  -Labs and cxr in AM  - F/E/N KVO/replace abnormal lytes/ NPO  - DVT/GI heparin sq/ protonix  - Code status: DNR-CCA, DNI            Norberto Hint, APRN-CNP   Critical Care Time 34 minutes     Discussed case with Attending Physician: Dr. Bartolo Marshall

## 2023-04-22 LAB
BACTERIA BLD CULT ORG #2: NORMAL
BACTERIA BLD CULT: NORMAL

## 2023-04-24 NOTE — PROGRESS NOTES
Adi called. Patient is not a candidate for organ donation. Oly Hummel would like to be called with patient's time of date.
At 0443 on 2023 Maria C Vallejo  the patient was absent of pulse, blood pressure, respirations, and presence of dilated pupils, unresponsive to light this was verfied by myself Adalgisa Broderick RN and doubled verified by Caitlin ROTHMAN.  Dr Olivia Martinez will be pronouncing the patients death and signing the death certificate
Called hospice house regarding moving pt today, was told will be called back when np available
Called rrt on pt due to pressure being 66/40. Dr. Monterroso Senior ordered 500cc bolus, albumin, and to be transferred to the ICU. Family Rosaline Blanchard) notified.
Consults and primary Dr notified of patients death
Contacted Odalis Camp ([phone]) about the condition of the pt and if she still wanted intubation for the pt. She said that she does not want intubation for the pt, and that if the pt's condition continues to decline, to contact her. Will continue to closely monitor the pt.
Contacted and spoke with Jasmeet RN. And updated on conversation with son and daughter yesterday. I placed a call to [facility] patient's daughter and left voice mail with contact information. Awaiting return call if family agrees to transfer to the [facility].
Date:2023  Patient Name: Tanesha Candelaria  MRN: [id number]  : 1950  ROOM #: [id number]/IC11-01    Occupational Therapy order received, chart reviewed and evaluation attempted this date. Patient isn't appropriate for therapy at this time due to patient on comfort care. Please reorder Occupational therapy if therapy is warranted.       Benji Gibson OTR/L #[id number]
Daughter [facility] called and choiced for hospice of the [facility]. Call placed to hospice of [facility] with consult, will await callback or visit.     Electronically signed by Lisbet Lai RN on 4/18/2023 at 1:49 PM
Daughter dorian called and updated on patients status
During rounds family Adolfo Barragan) was discussing code status and plans for care. She would like to hold all invasive procedures until she speaks with her brother. Received a call back from daughter Stan Toledo, she spoke with brother, they would like to make her hospice with no invasive procedures. Dr Esther Lopez updated while Stan Toledo is on the phone and ok to proceed with changing code status and ordering consult for hospice and palliative care if needed.      Electronically signed by Erich Canales RN on 4/18/2023 at 12:31 PM
Hospice consult received. Met with patient son and daughter at length. Family agrees to comfort care and discontinuing all medications and therapies. Family declines transfer to the [facility]. Comfort Care will be initiated in the ICU.   in agreement
Loco Collins was receiving oral levothyroxine prior to admission and intravenous levothyroxine has been ordered. Levothyroxine has a long half-life. Per the [address] restrictions for IV levothyroxine, this order has been modified to start 6 days from the date ordered. Please contact the Pharmacy with any questions or concerns.   Madhuri Law RPh 4/17/2023 8:56 PM
Notified by nursing staff that the patient is requiring morphine and ativan every 15 minutes and had a RDOS score >3. Will order a morphine drip.      Electronically signed by RASHARD Stone CNP on 4/19/2023 at 7:25 PM
Patient's daughter Chika Mendoza returned call to this RN. She was very frustrated receiving a call from Hospice asking if she would like her mother to transfer to the [facility]. She expressed she was told her mother was able to stay in the hospital for comfort care and pass. She declines hospice and transfer at this time. I explained if her decision changes PIPE can be contacted.
Per request of family azul on call is request to come for family support and to give patient last rights
Pharmacy Consultation Note  (Antibiotic Dosing and Monitoring)    Initial consult date: 4/18/23  Consulting physician/provider: RASHARD Parkinson - CNP  Drug: Vancomycin  Indication: Pneumonia(HAP)    Age/  Gender Height Weight IBW  Allergy Information   72 y.o./female @FLOW(11:first:1)@ @FLOW[14:FIRST:1@     Ideal body weight: 45.5 kg (100 lb 4.9 oz)  Adjusted ideal body weight: 60.3 kg (132 lb 13.9 oz)   Ciprofloxacin, Lmw heparin, Codeine, and Digoxin and related      Renal Function:  Recent Labs     04/17/23  0949   BUN 25*   CREATININE 2.8*       Intake/Output Summary (Last 24 hours) at 4/18/2023 0346  Last data filed at 4/17/2023 1745  Gross per 24 hour   Intake 300 ml   Output 2000 ml   Net -1700 ml       Vancomycin Monitoring:  Trough:  No results for input(s): VANCOTROUGH in the last 72 hours. Random:  No results for input(s): VANCORANDOM in the last 72 hours. No results for input(s): Sandria Laramie in the last 72 hours. Historical Cultures:  Organism   Date Value Ref Range Status   03/10/2023 Enterobacter cloacae complex (A)  Final   03/10/2023 Candida albicans (A)  Final     No results for input(s): BC in the last 72 hours. Vancomycin Administration Times:  Recent vancomycin administrations        No vancomycin IV orders with administrations found. Orders not given:            vancomycin (VANCOCIN) 1,250 mg in sodium chloride 0.9 % 250 mL IVPB                    Assessment:  Patient is a 67 y.o. female who has been initiated on vancomycin  Estimated Creatinine Clearance: 17 mL/min (A) (based on SCr of 2.8 mg/dL (H)).   To dose vancomycin, pharmacy will be utilizing dosing based off of levels due to patient requiring hemodialysis    Plan:  A one time dose of Vanco 1000mg IV was given  Will check vancomycin levels when appropriate  Will continue to monitor renal function   Pharmacy to follow      Vidhi Braden [facility] 4/18/2023 3:46 AM
Physician Progress Note      PATIENT:               Cherie Lopez  CSN #:                  [id number]  :                       1950  ADMIT DATE:       2023 9:08 AM  DISCH DATE:        2023 8:30 AM  RESPONDING  PROVIDER #:        Yi Charles MD          QUERY TEXT:    Dear Dr. Sanya Bolaños,    Pt admitted with A-fib with RVR altered mental status and need for dialysis   and CHF. Pt noted to have sepsis. If possible, please document in progress   notes and discharge summary the present on admission status of sepsis:    The medical record reflects the following:  Risk Factors: ESRD on HD, A fib, Chronic CHF, morbid obesity, SERENA, chronic   respiratory failure, CAD, HTN, HLD, DM, Parkinson's disease, COVID-19 positive  Clinical Indicators: per ED note:  \" patient is tachycardic. .. Patient has a   significant SHANTANU, leukocytosis and a respiratory distress with hypoxia. Marycarmen Rodriguez She is   COVID-positive. \"My impression is respiratory acidosis, sepsis shock with   end organ damage of SHANTANU from COVID pnuemonia that has induced A.fib rvr. \"; per   H&P: \"AMS- almost unresponsive\";  RRT for septic shock. ..  500mL IVF   bolus, 25g albumin x 1\"; CRP 2.5; temp 99.7-96.0; -75  Treatment: IMCU/ICU monitoring, Cardiology and Renal consults,  2L IV NSS   bolus in ED, IVF's, CXR, ABG's, BIPAP, IV Cardizem, IV Rocephin, Vibramycin,   Decadron,  IV Vancomycin and Cefepime    Thank You,  Juan Saldana RN, BSN, CDS  Clinical Documentation Improvement Specialist  [phone]  Options provided:  -- Yes, sepsis was present at the time of the order to admit to the hospital.  -- No, sepsis was not present on admission and developed during the inpatient   stay  -- Other - I will add my own diagnosis  -- Disagree - Not applicable / Not valid  -- Disagree - Clinically unable to determine / Unknown  -- Refer to Clinical Documentation Reviewer    PROVIDER RESPONSE TEXT:    No, sepsis was not present on admission and
Pt seen note dictated  #[id number]
Spoke with [facility]) regarding pt going to hospice house, stated she wants pt to stay at [address] [place] [address].
Subjective:  Patient seen with son and granddaughter at bedside unresponsive in no acute distress  Patient is currently DNR CC and hospice has been consulted she is currently under hospice care    Objective:    BP 85/66   Pulse (!) 161   Temp 97.1 °F (36.2 °C) (Axillary)   Resp 15   Ht 5' (1.524 m)   Wt 181 lb 11.2 oz (82.4 kg)   SpO2 100%   BMI 35.49 kg/m²   Slow breath hypotensive tachycardic  No further exam  Mental status patient is comatose    Assessment:    Principal Problem:    Acute on chronic respiratory failure with hypercapnia (HCC)  Active Problems:    Acute diastolic (congestive) heart failure (HCC)    Acute respiratory failure with hypoxia (HCC)    Pulmonary HTN (HCC)    Persistent atrial fibrillation (HCC)    Hyperlipidemia    Mitral regurgitation    Obstructive sleep apnea syndrome    Diabetes mellitus ([address]    Parkinson's disease ([address]    Primary hypertension    Morbid obesity ([address]    Shock ([address]    ESRD on dialysis ([address]    Anxiety    A-fib ([address]    Altered mental status    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  *        Plan:  Case discussed with the son and family in agreement that patient should be DNR CC hospice care as she has been coming back to the hospital multiple times no improvement quality of life is very poor patient has refused colostomy in the past or her 7 children were here yesterday  All questions were answered    Deshaun Bruce MD  10:55 AM  4/19/2023
Subjective: The patient is awake   No problems overnight. Denies chest pain, angina, and dyspnea. Denies abdominal pain. Tolerating diet. No nausea or vomiting.     Objective:    /74   Pulse (!) 123   Temp (!) 96 °F (35.6 °C) (Axillary)   Resp 13   Ht 5' (1.524 m)   Wt 181 lb 11.2 oz (82.4 kg)   SpO2 100%   BMI 35.49 kg/m²   Head and Neck: normal atraumatic, no neck masses, normal thyroid, no jvd  Heart:  irregular rate and rhythm, S1, S2 normal, no murmur, click, rub or gallop  Lungs:  chest clear, no wheezing, rales, normal symmetric air entry,  no chest wall deformities or tenderness  Abd: soft, non-tender, without masses or organomegaly  Extrem:  No clubbing, cyanosis, or edema  Neuro:Normal, no focal neurological     CBC with Differential:    Lab Results   Component Value Date/Time    WBC 8.0 04/18/2023 04:06 AM    RBC 3.22 04/18/2023 04:06 AM    HGB 8.4 04/18/2023 04:06 AM    HCT 31.8 04/18/2023 04:06 AM     04/18/2023 04:06 AM    MCV 98.8 04/18/2023 04:06 AM    MCH 26.1 04/18/2023 04:06 AM    MCHC 26.4 04/18/2023 04:06 AM    RDW 18.4 04/18/2023 04:06 AM    NRBC 1.7 04/11/2023 06:50 AM    SEGSPCT 74 08/20/2013 10:45 AM    METASPCT 0.9 04/18/2023 04:06 AM    LYMPHOPCT 4.3 04/18/2023 04:06 AM    MONOPCT 0.9 04/18/2023 04:06 AM    MYELOPCT 1.7 04/18/2023 04:06 AM    EOSPCT 1 06/16/2017 12:00 AM    BASOPCT 0.1 04/18/2023 04:06 AM    MONOSABS 0.08 04/18/2023 04:06 AM    LYMPHSABS 0.32 04/18/2023 04:06 AM    EOSABS 0.00 04/18/2023 04:06 AM    BASOSABS 0.00 04/18/2023 04:06 AM     CMP:    Lab Results   Component Value Date/Time     04/18/2023 04:06 AM    K 3.3 04/18/2023 04:06 AM    K 4.0 04/06/2023 06:00 AM    CL 98 04/18/2023 04:06 AM    CO2 28 04/18/2023 04:06 AM    BUN 18 04/18/2023 04:06 AM    CREATININE 1.6 04/18/2023 04:06 AM    GFRAA >60 10/16/2022 09:20 AM    LABGLOM 34 04/18/2023 04:06 AM    GLUCOSE 137 04/18/2023 04:06 AM    PROT 5.7 04/17/2023 09:49 AM    LABALBU 3.7
demand for energy/nutrients as evidenced by dialysis    Nutrition Interventions:   Food and/or Nutrient Delivery: Continue NPO  Nutrition Education/Counseling: No recommendation at this time  Coordination of Nutrition Care: Continue to monitor while inpatient    Goals:  Specify Other Goals: Nutrition progression    Nutrition Monitoring and Evaluation:   Behavioral-Environmental Outcomes: None Identified  Food/Nutrient Intake Outcomes: Diet Advancement/Tolerance  Physical Signs/Symptoms Outcomes: Biochemical Data, Nutrition Focused Physical Findings, Skin, Weight, Chewing or Swallowing, GI Status, Fluid Status or Edema, Hemodynamic Status    Discharge Planning:     Too soon to determine     Matt Luna, MS, RD, LD  Contact: [phone]
dialyzed yesterday    Plan:  Get a random level tomorrow (4/19) before HD   Will continue to monitor renal function and follow HD orders from nephrology  Pharmacy to follow    Thank you for the consult,    Norma Fung, CadenceD Candidate 4/18/2023 1:10 PM   Pharmacy Intern  [address]     I have reviewed the above information with the pharmacy student/resident. Any changes can made are noted in bold/italics and or .     Jj Conner PharmD, BCPS 4/18/2023 1:37 PM   [phone]

## 2023-04-27 ASSESSMENT — ENCOUNTER SYMPTOMS
TROUBLE SWALLOWING: 0
EYE ITCHING: 0
SHORTNESS OF BREATH: 0
EYE DISCHARGE: 0
VOICE CHANGE: 0
SORE THROAT: 0
EYE REDNESS: 0
GASTROINTESTINAL NEGATIVE: 1
SINUS PRESSURE: 0
RHINORRHEA: 0
SINUS PAIN: 0
WHEEZING: 0
COUGH: 0

## 2023-04-27 ASSESSMENT — VISUAL ACUITY: OU: 1

## 2023-05-03 ASSESSMENT — ENCOUNTER SYMPTOMS
SORE THROAT: 0
SINUS PAIN: 0
COUGH: 0
EYE REDNESS: 0
VOICE CHANGE: 0
SHORTNESS OF BREATH: 0
TROUBLE SWALLOWING: 0
WHEEZING: 0
GASTROINTESTINAL NEGATIVE: 1
EYE ITCHING: 0
RHINORRHEA: 0
SINUS PRESSURE: 0
EYE DISCHARGE: 0

## 2023-05-03 ASSESSMENT — VISUAL ACUITY: OU: 1

## 2023-05-25 NOTE — LETTER
Continue amlodipine   [place] Department Medicaid  CERTIFICATION OF NECESSITY  FOR NON-EMERGENCY TRANSPORTATION   BY GROUND AMBULANCE      Individual Information   1. Name: Pita Demarco 2. [place] Medicaid Billing Number:    3. Address: [place] [address]      Transportation Provider Information   4. Provider Name: TOÑO   5. [place] Medicaid Provider Number:  National Provider Identifier (NPI):      Certification  7. Criteria:  During transport, this individual requires:  [] Medical treatment or continuous     supervision by an EMT. [x] The administration or regulation of oxygen by another person. [] Supervised protective restraint. 8. Period Beginning Date: 2/24/2023.   9. Length  [x] Not more than 1DAY(s)  [] One Year     Additional Information Relevant to Certification   10. Comments or Explanations, If Necessary or Appropriate   ACUTE ON CHRONIC RESPIRATORY FAILURE WITH HYPOXIA AND HYPERCAPNIA, SHANTANU ON HEMODIALYSIS, UTI, CHF, ANXIETY, PARKINSONS DISEASE, FALLS RISK, INABILITY TO AMBULATE, BED CONFINED BEFORE AND AFTER HOSPITAL STAY, PARKINSON'S DISEASE, CHF, CONTINUOUS O2 AT 1 LITER            Certifying Practitioner Information   11. Name of Practitioner: DR Norberto Judge MD   12. [place] Medicaid Provider Number, If Applicable: [address] Provider Identifier (NPI):  [id number]     Signature Information   14. Date of Signature: 2/24/2023. 15. Name of Person Signing:Jami Velázquez 876 Signature and Professional Designation: ESTHER Rehman.2/24/2023.9:34 AM.      OD [id number]  Rev. 7/2015    [facility] Encounter Date/Time: 1/17/2023 [address] Account: [de-identified]    MRN: [id number]    Patient: Pita Demarco    Contact Serial #: [id number]      ENCOUNTER          Patient Class: I Private Enc? No Unit RM BD: [address] [facility] [id number]   Hospital Service:  INM   Encounter DX: NSTEMI (non-ST elevated *   ADM Provider: Fernando Davidson MD   Procedure:     ATT Provider: Bryan Vargas MD   REF Provider:        Admission DX: NSTEMI (non-ST elevated myocardial infarction) [facility]), Atrial fibrillation with rapid ventricular response ([address] Recurrent right pleural effusion, Hypotension, unspecified hypotension type, Acute on chronic respiratory failure with hypoxia and hypercapnia (HCC) and DX codes: I21.4, I48.91, J90, I95.9, J96.21, J96.22      PATIENT                 Name: Kareem Pickens : 1950 (72 yrs)   Address: [facility] Sex: Female   [place] city: [place] [address]         Marital Status:    Employer: RETIRED         Worship: Judaism   Primary Care Provider: Gabbie Glasgow MD         Primary Phone: [phone]   EMERGENCY CONTACT   Contact Name Legal Guardian? Relationship to Patient Home Phone Work Phone   1. Tom Thomas  2. YovanyLilliana No  No Child  Child [phone]              GUARANTOR            Guarantor: Kareem Pickens     : 1950   Address: [facility] Sex: Female     [address] [facility] [address]     Relation to Patient: Self       Home Phone: [phone]   Guarantor ID: [id number]       Work Phone:     Guarantor Employer: RETIRED         Status: RETIRED      COVERAGE        PRIMARY INSURANCE   Payor: [facility] MEDICARE Plan: Ellen PEREZ*   Payor Address: ,          Group Number: [id number] Insurance Type: INDEMNITY   Subscriber Name: Kanchan Arredondo : 1950   Subscriber ID: [id number] Pat. Rel. to Sub: Self   SECONDARY INSURANCE   Payor: Francoise Oliver* Plan: Johnny MCKAY*   Payor Address:  [facility] [address] [facility]          Group Number: [id number] Insurance Type: INDEMNITY   Subscriber Name: Kanchan Arredondo : 1950   Subscriber ID: [id number] Pat.  Rel. to Sub: SELF      CSN: [id number]

## 2023-06-27 NOTE — TELEPHONE ENCOUNTER
Received call from facility [facility]) re: recent BP checks. Facility reported:     1/16/23 at 2000: 98/56  1/17/23 at 0000: 96/56  1/17/23 at 0400: 100/64  1/17/23 at 0800: 98/76    BP's reported to Olivia VINCENT. Orders received. Facility to increase Bumex to 2 mg BID x 3 days until patient's appointment in CHF clinic this Friday 1/20/23. Spoke with patient's nurse Jocelyn Mancia. Telephone order faxed to facility at this time at [phone].     Electronically signed by Erin Garrison RN on 1/17/2023 at 3:33 PM
Never smoker

## (undated) DEVICE — BITEBLOCK 54FR W/ DENT RIM BLOX

## (undated) DEVICE — SPONGE GZ W4XL4IN RAYON POLY FILL CVR W/ NONWOVEN FAB

## (undated) DEVICE — Z DISCONTINUED NO SUB IDED TUBING ETCO2 AD L6.5FT NSL ORAL CVD PRNG NONFLARED TIP OVR

## (undated) DEVICE — DEFENDO AIR WATER SUCTION AND BIOPSY VALVE KIT FOR  OLYMPUS: Brand: DEFENDO AIR/WATER/SUCTION AND BIOPSY VALVE